# Patient Record
Sex: MALE | Race: WHITE | NOT HISPANIC OR LATINO | Employment: UNEMPLOYED | URBAN - METROPOLITAN AREA
[De-identification: names, ages, dates, MRNs, and addresses within clinical notes are randomized per-mention and may not be internally consistent; named-entity substitution may affect disease eponyms.]

---

## 2017-03-24 ENCOUNTER — ALLSCRIPTS OFFICE VISIT (OUTPATIENT)
Dept: OTHER | Facility: OTHER | Age: 52
End: 2017-03-24

## 2017-03-24 DIAGNOSIS — R10.84 GENERALIZED ABDOMINAL PAIN: ICD-10-CM

## 2017-03-24 DIAGNOSIS — R19.7 DIARRHEA: ICD-10-CM

## 2017-04-01 ENCOUNTER — HOSPITAL ENCOUNTER (OUTPATIENT)
Dept: RADIOLOGY | Facility: HOSPITAL | Age: 52
Discharge: HOME | End: 2017-04-01
Payer: COMMERCIAL

## 2017-04-01 DIAGNOSIS — R10.84 GENERALIZED ABDOMINAL PAIN: ICD-10-CM

## 2017-04-01 PROCEDURE — 76700 US EXAM ABDOM COMPLETE: CPT

## 2017-04-04 ENCOUNTER — GENERIC CONVERSION - ENCOUNTER (OUTPATIENT)
Dept: OTHER | Facility: OTHER | Age: 52
End: 2017-04-04

## 2017-05-10 ENCOUNTER — ALLSCRIPTS OFFICE VISIT (OUTPATIENT)
Dept: OTHER | Facility: OTHER | Age: 52
End: 2017-05-10

## 2017-05-10 DIAGNOSIS — E55.9 VITAMIN D DEFICIENCY: ICD-10-CM

## 2017-05-10 DIAGNOSIS — R73.01 IMPAIRED FASTING GLUCOSE: ICD-10-CM

## 2017-05-10 DIAGNOSIS — E66.01 MORBID (SEVERE) OBESITY DUE TO EXCESS CALORIES (HCC): ICD-10-CM

## 2017-05-10 DIAGNOSIS — I10 ESSENTIAL (PRIMARY) HYPERTENSION: ICD-10-CM

## 2017-05-10 DIAGNOSIS — E53.8 DEFICIENCY OF OTHER SPECIFIED B GROUP VITAMINS: ICD-10-CM

## 2017-05-10 DIAGNOSIS — R53.83 OTHER FATIGUE: ICD-10-CM

## 2017-05-11 ENCOUNTER — GENERIC CONVERSION - ENCOUNTER (OUTPATIENT)
Dept: OTHER | Facility: OTHER | Age: 52
End: 2017-05-11

## 2017-05-11 ENCOUNTER — APPOINTMENT (OUTPATIENT)
Dept: LAB | Facility: HOSPITAL | Age: 52
End: 2017-05-11
Payer: COMMERCIAL

## 2017-05-11 ENCOUNTER — TRANSCRIBE ORDERS (OUTPATIENT)
Dept: LAB | Facility: HOSPITAL | Age: 52
End: 2017-05-11

## 2017-05-11 DIAGNOSIS — R53.83 OTHER FATIGUE: ICD-10-CM

## 2017-05-11 DIAGNOSIS — R73.01 IMPAIRED FASTING GLUCOSE: ICD-10-CM

## 2017-05-11 DIAGNOSIS — E53.8 DEFICIENCY OF OTHER SPECIFIED B GROUP VITAMINS: ICD-10-CM

## 2017-05-11 DIAGNOSIS — E66.01 MORBID (SEVERE) OBESITY DUE TO EXCESS CALORIES (HCC): ICD-10-CM

## 2017-05-11 DIAGNOSIS — E55.9 VITAMIN D DEFICIENCY: ICD-10-CM

## 2017-05-11 DIAGNOSIS — I10 ESSENTIAL (PRIMARY) HYPERTENSION: ICD-10-CM

## 2017-05-11 LAB
25(OH)D3 SERPL-MCNC: 48.1 NG/ML (ref 30–100)
ALBUMIN SERPL BCP-MCNC: 3.4 G/DL (ref 3.5–5)
ALP SERPL-CCNC: 98 U/L (ref 46–116)
ALT SERPL W P-5'-P-CCNC: 32 U/L (ref 12–78)
ANION GAP SERPL CALCULATED.3IONS-SCNC: 9 MMOL/L (ref 4–13)
AST SERPL W P-5'-P-CCNC: 32 U/L (ref 5–45)
BASOPHILS # BLD AUTO: 0.07 THOUSANDS/ΜL (ref 0–0.1)
BASOPHILS NFR BLD AUTO: 1 % (ref 0–1)
BILIRUB SERPL-MCNC: 0.67 MG/DL (ref 0.2–1)
BUN SERPL-MCNC: 8 MG/DL (ref 5–25)
CALCIUM SERPL-MCNC: 8.9 MG/DL (ref 8.3–10.1)
CHLORIDE SERPL-SCNC: 102 MMOL/L (ref 100–108)
CO2 SERPL-SCNC: 27 MMOL/L (ref 21–32)
CREAT SERPL-MCNC: 0.86 MG/DL (ref 0.6–1.3)
EOSINOPHIL # BLD AUTO: 0.44 THOUSAND/ΜL (ref 0–0.61)
EOSINOPHIL NFR BLD AUTO: 5 % (ref 0–6)
ERYTHROCYTE [DISTWIDTH] IN BLOOD BY AUTOMATED COUNT: 12.4 % (ref 11.6–15.1)
EST. AVERAGE GLUCOSE BLD GHB EST-MCNC: 183 MG/DL
GFR SERPL CREATININE-BSD FRML MDRD: >60 ML/MIN/1.73SQ M
GLUCOSE P FAST SERPL-MCNC: 217 MG/DL (ref 65–99)
HBA1C MFR BLD: 8 % (ref 4.2–6.3)
HCT VFR BLD AUTO: 45.6 % (ref 36.5–49.3)
HGB BLD-MCNC: 15.4 G/DL (ref 12–17)
LYMPHOCYTES # BLD AUTO: 1.52 THOUSANDS/ΜL (ref 0.6–4.47)
LYMPHOCYTES NFR BLD AUTO: 18 % (ref 14–44)
MCH RBC QN AUTO: 30.7 PG (ref 26.8–34.3)
MCHC RBC AUTO-ENTMCNC: 33.8 G/DL (ref 31.4–37.4)
MCV RBC AUTO: 91 FL (ref 82–98)
MONOCYTES # BLD AUTO: 0.56 THOUSAND/ΜL (ref 0.17–1.22)
MONOCYTES NFR BLD AUTO: 7 % (ref 4–12)
NEUTROPHILS # BLD AUTO: 5.61 THOUSANDS/ΜL (ref 1.85–7.62)
NEUTS SEG NFR BLD AUTO: 69 % (ref 43–75)
NRBC BLD AUTO-RTO: 0 /100 WBCS
PLATELET # BLD AUTO: 251 THOUSANDS/UL (ref 149–390)
PMV BLD AUTO: 10.5 FL (ref 8.9–12.7)
POTASSIUM SERPL-SCNC: 4.1 MMOL/L (ref 3.5–5.3)
PROT SERPL-MCNC: 7.1 G/DL (ref 6.4–8.2)
RBC # BLD AUTO: 5.01 MILLION/UL (ref 3.88–5.62)
SODIUM SERPL-SCNC: 138 MMOL/L (ref 136–145)
TSH SERPL DL<=0.05 MIU/L-ACNC: 2.68 UIU/ML (ref 0.36–3.74)
VIT B12 SERPL-MCNC: 217 PG/ML (ref 100–900)
WBC # BLD AUTO: 8.29 THOUSAND/UL (ref 4.31–10.16)

## 2017-05-11 PROCEDURE — 83036 HEMOGLOBIN GLYCOSYLATED A1C: CPT

## 2017-05-11 PROCEDURE — 80053 COMPREHEN METABOLIC PANEL: CPT

## 2017-05-11 PROCEDURE — 36415 COLL VENOUS BLD VENIPUNCTURE: CPT

## 2017-05-11 PROCEDURE — 82607 VITAMIN B-12: CPT

## 2017-05-11 PROCEDURE — 85025 COMPLETE CBC W/AUTO DIFF WBC: CPT

## 2017-05-11 PROCEDURE — 84443 ASSAY THYROID STIM HORMONE: CPT

## 2017-05-11 PROCEDURE — 82306 VITAMIN D 25 HYDROXY: CPT

## 2017-05-15 ENCOUNTER — ALLSCRIPTS OFFICE VISIT (OUTPATIENT)
Dept: OTHER | Facility: OTHER | Age: 52
End: 2017-05-15

## 2017-05-26 ENCOUNTER — ALLSCRIPTS OFFICE VISIT (OUTPATIENT)
Dept: OTHER | Facility: OTHER | Age: 52
End: 2017-05-26

## 2017-06-02 ENCOUNTER — ALLSCRIPTS OFFICE VISIT (OUTPATIENT)
Dept: OTHER | Facility: OTHER | Age: 52
End: 2017-06-02

## 2017-08-28 DIAGNOSIS — E11.65 TYPE 2 DIABETES MELLITUS WITH HYPERGLYCEMIA (HCC): ICD-10-CM

## 2017-08-28 DIAGNOSIS — I10 ESSENTIAL (PRIMARY) HYPERTENSION: ICD-10-CM

## 2017-09-11 ENCOUNTER — ALLSCRIPTS OFFICE VISIT (OUTPATIENT)
Dept: OTHER | Facility: OTHER | Age: 52
End: 2017-09-11

## 2017-10-01 ENCOUNTER — APPOINTMENT (EMERGENCY)
Dept: RADIOLOGY | Facility: HOSPITAL | Age: 52
End: 2017-10-01
Payer: COMMERCIAL

## 2017-10-01 ENCOUNTER — HOSPITAL ENCOUNTER (EMERGENCY)
Facility: HOSPITAL | Age: 52
Discharge: HOME | End: 2017-10-01
Attending: EMERGENCY MEDICINE
Payer: COMMERCIAL

## 2017-10-01 VITALS
WEIGHT: 315 LBS | HEART RATE: 92 BPM | TEMPERATURE: 97.3 F | DIASTOLIC BLOOD PRESSURE: 84 MMHG | SYSTOLIC BLOOD PRESSURE: 157 MMHG | RESPIRATION RATE: 18 BRPM | OXYGEN SATURATION: 96 %

## 2017-10-01 DIAGNOSIS — M79.18 BUTTOCK PAIN: Primary | ICD-10-CM

## 2017-10-01 DIAGNOSIS — M25.552 LEFT HIP PAIN: ICD-10-CM
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
## 2017-10-01 PROCEDURE — 73502 X-RAY EXAM HIP UNI 2-3 VIEWS: CPT

## 2017-10-01 PROCEDURE — 99283 EMERGENCY DEPT VISIT LOW MDM: CPT

## 2017-10-01 RX ORDER — ACETAMINOPHEN 325 MG/1
650 TABLET ORAL ONCE
Status: COMPLETED | OUTPATIENT
Start: 2017-10-01 | End: 2017-10-01

## 2017-10-01 RX ORDER — METOPROLOL SUCCINATE 50 MG/1
50 TABLET, EXTENDED RELEASE ORAL DAILY
COMMUNITY
Start: 2017-06-02 | End: 2019-04-19 | Stop reason: ALTCHOICE

## 2017-10-01 RX ORDER — LIDOCAINE 50 MG/G
1 PATCH TOPICAL ONCE
Status: DISCONTINUED | OUTPATIENT
Start: 2017-10-01 | End: 2017-10-02 | Stop reason: HOSPADM

## 2017-10-01 RX ORDER — ATORVASTATIN CALCIUM 10 MG/1
10 TABLET, FILM COATED ORAL DAILY
COMMUNITY
Start: 2016-11-18 | End: 2018-03-12 | Stop reason: SDUPTHER

## 2017-10-01 RX ADMIN — ACETAMINOPHEN 650 MG: 325 TABLET, FILM COATED ORAL at 21:01

## 2017-10-01 RX ADMIN — LIDOCAINE 1 PATCH: 50 PATCH CUTANEOUS at 21:01

## 2017-10-02 NOTE — ED ATTENDING ATTESTATION
Jacob Aceves MD, saw and evaluated the patient  I have discussed the patient with the resident/non-physician practitioner and agree with the resident's/non-physician practitioner's findings, Plan of Care, and MDM as documented in the resident's/non-physician practitioner's note, except where noted  All available labs and Radiology studies were reviewed  At this point I agree with the current assessment done in the Emergency Department  I have conducted an independent evaluation of this patient a history and physical is as follows:  Pt had a fall Slipped and fell on wood floors Pt did split  Pt did not hit hea dno loc Hit legs and buttocks  Pt has pain over L gluteus and l hip  Pain is worse with movement no abd pain   Pt is able to ambulate the event occurred at 5:30 PE: alert nad heart reg lungs clear abd soft nontender no spine tenderness L buttock and l lateral hip tender pain with external rotation of hip good nv normal; sensation MDM: will do xray to eval ? Small avulsion fracture off latera greater trochanter Will place on crutches to follow with ortho     Critical Care Time  CritCare Time

## 2017-10-02 NOTE — DISCHARGE INSTRUCTIONS
Musculoskeletal Pain   WHAT YOU NEED TO KNOW:   Muscle pain can be dull, achy, or sharp  You may have pain and tenderness to the touch as well  It can occur anywhere on your body and is often brought on by exercise  Muscle pain may occur from an injury, such as a sprain, tendonitis, or bone fracture  Muscle pain can also be the result of medical conditions, such as polymyositis, fibromyalgia, and connective tissue disorders  DISCHARGE INSTRUCTIONS:   Self care:   · Rest  as directed and avoid activity that causes pain  You may be able to return to normal activity when you can move without pain  Follow directions for rest and activity  You are at risk for injury for 3 weeks after your symptoms go away  · Ice  your painful muscle area to decrease pain and swelling  Use an ice pack, or put ice in a plastic bag and cover it with a towel  Always  put a cloth between the ice and your skin  Apply the ice as often as directed for the first 24 to 48 hours  · Compression  with a splint, brace, or elastic bandage helps decrease pain and swelling  This may be needed for muscle pain in arms or legs  A splint, brace, or bandage will also help protect the painful area when you move around  · Elevate  a painful arm or leg to reduce swelling and pain  Elevate your limb while you are sitting or lying down  Prop a painful leg on pillows to keep it above the level of your heart  Medicines:   · NSAIDs  help decrease swelling and pain or fever  This medicine is available with or without a doctor's order  NSAIDs can cause stomach bleeding or kidney problems in certain people  If you take blood thinner medicine, always ask your healthcare provider if NSAIDs are safe for you  Always read the medicine label and follow directions  · Acetaminophen  is used to decrease pain  It is available without a doctor's order  Ask your healthcare provider how much to take and when to take it  Follow directions   Acetaminophen can cause liver damage if not taken correctly  Do not take more than one medicine that contains acetaminophen unless directed  · Muscle relaxers  help relax your muscles to decrease pain and muscle spasms  · Steroids  may be given to decrease redness, pain, and swelling  · Take your medicine as directed  Contact your healthcare provider if you think your medicine is not helping or if you have side effects  Tell him if you are allergic to any medicine  Keep a list of the medicines, vitamins, and herbs you take  Include the amounts, and when and why you take them  Bring the list or the pill bottles to follow-up visits  Carry your medicine list with you in case of an emergency  Follow up with your healthcare provider as directed: You may need more tests to help healthcare providers find the cause of your muscle pain  You may need physical therapy to learn muscle strengthening exercises  Write down your questions so you remember to ask them during your visits  Contact your healthcare provider if:   · You have a fever  · You have trouble sleeping because of your pain  · Your painful area becomes more tender, red, and warm to the touch  · You have decreased movement of the painful area  · You have questions or concerns about your condition or care  Return to the emergency department if:   · You have increased severe pain when you move the painful muscle area  · You lose feeling in your painful muscle area  · You have new or worse swelling in the painful area  Your skin may feel tight  · You have increased muscle pain or pain that does not improve with treatment  © 2017 [address] [employer] Information is for End User's use only and may not be sold, redistributed or otherwise used for commercial purposes  All illustrations and images included in CareNotes® are the copyrighted property of [employer]  or [address]  The above information is an  only  It is not intended as medical advice for individual conditions or treatments  Talk to your doctor, nurse or pharmacist before following any medical regimen to see if it is safe and effective for you  Hip Pain   WHAT YOU NEED TO KNOW:   Hip pain can be caused by a number of conditions, such as bursitis, arthritis, or muscle or tendon strain  X-rays do not show broken bones  You may have swelling in the fluid-filled sacs that protect your muscles and tendons  Hip pain can also be caused by a lower back problem  Hip pain may be caused by trauma, playing sports, or running  Your pain may start in your hip and go to your thigh, buttock, or groin  DISCHARGE INSTRUCTIONS:   Medicines:   · NSAIDs , such as ibuprofen, help decrease swelling, pain, and fever  This medicine is available with or without a doctor's order  NSAIDs can cause stomach bleeding or kidney problems in certain people  If you take blood thinner medicine, always ask your healthcare provider if NSAIDs are safe for you  Always read the medicine label and follow directions  · Take your medicine as directed  Contact your healthcare provider if you think your medicine is not helping or if you have side effects  Tell him of her if you are allergic to any medicine  Keep a list of the medicines, vitamins, and herbs you take  Include the amounts, and when and why you take them  Bring the list or the pill bottles to follow-up visits  Carry your medicine list with you in case of an emergency  Return to the emergency department if:   · Your pain gets worse  · You have numbness in your leg or toes  · You cannot put any weight on or move your hip  Contact your healthcare provider if:   · You have a fever  · Your pain does not decrease, even after treatment  · You have questions or concerns about your condition or care  Follow up with your healthcare provider as directed: You may need physical therapy, an injection, or more testing   You may need to see an orthopedic specialist  Write down your questions so you remember to ask them during your visits  Manage your hip pain:   · Rest  your injured hip so that it can heal  You may need to avoid putting any weight on your hip for at least 48 hours  Return to normal activities as directed  · Ice  the injury for 20 minutes every 4 hours, or as directed  Use an ice pack, or put crushed ice in a plastic bag  Cover it with a towel to protect your skin  Ice helps prevent tissue damage and decreases swelling and pain  · Elevate  your injured hip above the level of your heart as often as you can  This will help decrease swelling and pain  If possible, prop your hip and leg on pillows or blankets to keep the area elevated comfortably  · Maintain a healthy weight  Extra body weight can cause pressure and pain in your hip, knee, and ankle joints  Ask your healthcare provider how much you should weigh  Ask him to help you create a weight loss plan if you are overweight  · Use assistive devices as directed  You may need to use a cane or crutches  Assistive devices help decrease pain and pressure on your hip when you walk  Ask your healthcare provider for more information about assistive devices and how to use them correctly  © 2017 [facility] [facility] Information is for End User's use only and may not be sold, redistributed or otherwise used for commercial purposes  All illustrations and images included in CareNotes® are the copyrighted property of [employer]  or [employer]  The above information is an  only  It is not intended as medical advice for individual conditions or treatments  Talk to your doctor, nurse or pharmacist before following any medical regimen to see if it is safe and effective for you

## 2017-10-02 NOTE — ED PROVIDER NOTES
History  Chief Complaint   Patient presents with   Sultana Grover Fall     pt walking at his friends house on [address] with socks on and slipped and fell, co left hip pain, pt did not strike his head, no LOC, pt ambulated into ER     Patient is a 50year old M with pmhx significant for DM, s/p gastric bypass surgery, HTN who presents with left buttock pain and left hip pain s/p fall from standing  Reports he was walking on wooden floors in only socks and slipped falling in a split position, striking his left buttocks  Denies hitting head/ LOC  Complains of throbbing pain in the left buttocks and left hip, non-radiating, worse with movement though continues to be able to ambulate  Denies back pain, neck pain, numbness, tingling, loss of bowel or bladder control  Assessment and Plan: Left buttocks pain- MSK pain 2/2 mechanical fall  Will XR left hip/pelvis to r/o acute fracture  Lidoderm patch and tylenol as patient cannot get NSAIDs 2/2 gastric bypass surgery  Prior to Admission Medications   Prescriptions Last Dose Informant Patient Reported? Taking?   atorvastatin (LIPITOR) 10 mg tablet 10/1/2017 at Unknown time  Yes Yes   Sig: Take 10 mg by mouth daily   metoprolol succinate (TOPROL-XL) 50 mg 24 hr tablet 10/1/2017 at Unknown time  Yes Yes   Sig: Take 50 mg by mouth daily      Facility-Administered Medications: None       Past Medical History:   Diagnosis Date    Asthma     Diabetes mellitus     Gastric bypass status for obesity     Hyperlipidemia     Hypertension     Obesity        History reviewed  No pertinent surgical history  History reviewed  No pertinent family history  I have reviewed and agree with the history as documented  Social History   Substance Use Topics    Smoking status: Never Smoker    Smokeless tobacco: Never Used    Alcohol use 0 6 oz/week     1 Shots of liquor per week        Review of Systems   Constitutional: Negative for chills and fever     HENT: Negative for congestion  Eyes: Negative for visual disturbance  Respiratory: Negative for shortness of breath  Cardiovascular: Negative for chest pain and palpitations  Gastrointestinal: Negative for diarrhea, nausea and vomiting  Genitourinary: Negative for dysuria and hematuria  Musculoskeletal: Negative for back pain, neck pain and neck stiffness  Skin: Negative for wound  Neurological: Negative for dizziness, weakness, light-headedness, numbness and headaches  All other systems reviewed and are negative  Physical Exam  ED Triage Vitals [10/01/17 2018]   Temperature Pulse Respirations Blood Pressure SpO2   (!) 97 3 °F (36 3 °C) 92 22 155/80 97 %      Temp Source Heart Rate Source Patient Position - Orthostatic VS BP Location FiO2 (%)   Tympanic Monitor Lying Left arm --      Pain Score       Worst Possible Pain           Physical Exam   Constitutional: He is oriented to person, place, and time  He appears well-developed and well-nourished  No distress  HENT:   Head: Normocephalic and atraumatic  Right Ear: External ear normal    Left Ear: External ear normal    Nose: Nose normal    Mouth/Throat: Oropharynx is clear and moist  No oropharyngeal exudate  Eyes: Conjunctivae and EOM are normal  Pupils are equal, round, and reactive to light  Neck: Normal range of motion  Neck supple  Cardiovascular: Normal rate, regular rhythm, normal heart sounds and intact distal pulses  Exam reveals no gallop and no friction rub  No murmur heard  Pulmonary/Chest: Effort normal and breath sounds normal  No respiratory distress  He has no wheezes  He has no rales  He exhibits no tenderness  Abdominal: Soft  Bowel sounds are normal  He exhibits no distension  There is no tenderness  Musculoskeletal:        Right hip: Normal  He exhibits normal range of motion, normal strength, no tenderness, no bony tenderness, no swelling and no crepitus          Left hip: Normal  He exhibits normal range of motion, normal strength, no tenderness, no bony tenderness, no swelling and no crepitus  Left knee: Normal  He exhibits normal range of motion, no swelling, no effusion, no ecchymosis, no deformity and no laceration  Left ankle: Normal  He exhibits normal range of motion, no swelling, no ecchymosis, no deformity, no laceration and normal pulse  Cervical back: Normal  He exhibits normal range of motion, no tenderness, no bony tenderness, no swelling, no edema, no deformity and no laceration  Thoracic back: Normal  He exhibits normal range of motion, no tenderness, no bony tenderness, no swelling, no edema, no deformity and no laceration  Lumbar back: Normal  He exhibits normal range of motion, no tenderness, no bony tenderness, no swelling, no edema, no deformity and no laceration  Legs:       Left foot: Normal  There is normal range of motion, no tenderness, no bony tenderness, no swelling, normal capillary refill and no crepitus  Neurological: He is alert and oriented to person, place, and time  Moves all 4 extremities  Able to ambulate  Skin: Skin is warm and dry  Capillary refill takes less than 2 seconds  Rash (psoriasis patches on various locations over the body) noted  He is not diaphoretic  No erythema  Psychiatric: He has a normal mood and affect  His behavior is normal    Nursing note and vitals reviewed        ED Medications  Medications   lidocaine (LIDODERM) 5 % patch 1 patch (1 patch Transdermal Medication Applied 10/1/17 2101)   acetaminophen (TYLENOL) tablet 650 mg (650 mg Oral Given 10/1/17 2101)       Diagnostic Studies  Labs Reviewed - No data to display    XR hip/pelv 2-3 vws left   ED Interpretation   Abnormal   Possible chip off lateral aspect of greater trochanter unclear if   new          Procedures  Procedures      Phone Consults  ED Phone Contact    ED Course  ED Course as of Oct 01 2153   Beverlie Olszewski Oct 01, 2017   2137 Discussed RICE therapy with patient and instructed to take tylenol for pain- no NSAIDs  Patient verbalized understanding                                 MDM  CritCare Time    Disposition  Final diagnoses:   Buttock pain   Left hip pain   Fall from standing, initial encounter     ED Disposition     ED Disposition Condition Comment    Discharge  Jensen Garcia discharge to home/self care  Condition at discharge: Good        Follow-up Information     Follow up With Specialties Details Why Contact Info Additional Information    Hunter Bermudez MD Family Medicine Schedule an appointment as soon as possible for a visit in 1 week for re-evaluation [facility] [address] [facility]  [phone]       [address] [place] [address] Emergency Department Emergency Medicine Go to for re-evaluation, As needed, If symptoms worsen [address] [facility] [address] [facility] ED, [address] [address]  Schedule an appointment as soon as possible for a visit in 5 days for [facility]uation [facility]  [phone]         Patient's Medications   Discharge Prescriptions    No medications on file     No discharge procedures on file  ED Provider  Attending physically available and evaluated Jensen Garcia I managed the patient along with the ED Attending      Electronically Signed by       Kandace Dumont DO  Resident  10/01/17 3029       Kandace Dumont DO  Resident  10/01/17 7850

## 2017-10-04 ENCOUNTER — ALLSCRIPTS OFFICE VISIT (OUTPATIENT)
Dept: OTHER | Facility: OTHER | Age: 52
End: 2017-10-04

## 2017-10-05 NOTE — PROGRESS NOTES
Assessment  1  Contusion of left hip, initial encounter (924 01) (S70 [id number])    Plan  Contusion of left hip, initial encounter    · Follow-up visit in 1 month Evaluation and Treatment  Follow-up  Status: Hold For -  Scheduling  Requested for: [id number]   · Apply an ice pack 4 times a day for 20 minutes the first 2 days ; Status:Complete;   Done:  50HLZ6805 09:22AM   · Apply ice to your knee for 30 minutes after exercise to help reduce swelling and pain ;  Status:Complete;   Done: 37FJK2067 09:22AM    Discussion/Summary    29-year-old male with a contusion to his left hip  Reviewed the radiographs  At this point I have recommended regular stretching, range of motion, and icing  He may progress activities as tolerated  I will plan on seeing him back in a month for repeat evaluation  No x-rays should be needed at that time  documentation has been constructed using a voice recognition system  Chief Complaint  Left hip pain      History of Present Illness  HPI: 29-year-old male here today for evaluation of his left hip  On October 1, 2017 he slipped at a friend's house where he had hardwood floors  His legs without different directions and he came down on his buttock  Following day after the fall he had fairly severe pain  He was seen in the ER and is here today for his initial follow-up  He's had trouble weightbearing and now is using a cane because it hurts  He points to the posterior lateral aspect of his hip and buttock is to where it hurts him the most  He's unable to take anti-inflammatories due to a history of a gastric bypass surgery  He's not been icing it  He notes stiffness and soreness  It gets worse if he's been sitting for any length of time  medical intake form was reviewed      Review of Systems    Constitutional: No fever or chills, feels well, no tiredness, no recent weight loss or weight gain  Eyes: No complaints of red eyes, no eyesight problems     ENT: no complaints of loss of hearing, no nosebleeds, no sore throat  Cardiovascular: No complaints of chest pain, no palpitations, no leg claudication or lower extremity edema  Respiratory: No complaints of shortness of breath, no wheezing, no cough  Gastrointestinal: No complaints of abdominal pain, no constipation, no nausea or vomiting, no diarrhea or bloody stools  Genitourinary: No complaints of dysuria or incontinence, no hesitancy, no nocturia  Musculoskeletal: limb pain, but-as noted in HPI  Integumentary: No complaints of skin rash or lesion, no itching or dry skin, no skin wounds  Neurological: No complaints of headache, no confusion, no numbness or tingling, no dizziness  Psychiatric: No suicidal thoughts, no anxiety, no depression  Endocrine: No muscle weakness, no frequent urination, no excessive thirst, no feelings of weakness  ROS reviewed  Active Problems  1  Acute bronchitis with obstruction (466 0) (J20 9)   2  Acute gastroenteritis (558 9) (K52 9)   3  Essential hypertension (401 9) (I10)   4  Fatigue (780 79) (R53 83)   5  Hyperlipidemia (272 4) (E78 5)   6  Mild persistent asthma without complication (768 75) (P62 92)   7  Morbid obesity due to excess calories (278 01) (E66 01)   8  Primary osteoarthritis of right knee (715 16) (M17 11)   9  Psoriasis (696 1) (L40 9)   10  Type 2 diabetes mellitus with hyperglycemia (250 00) (E11 65)   11  Vitamin B12 deficiency (266 2) (E53 8)   12   Vitamin D deficiency (268 9) (E55 9)    Past Medical History   · History of Abdominal pain, generalized (789 07) (R10 84)   · History of Acute gastritis without hemorrhage (535 00) (K29 00)   · History of asthma (V12 69) (Z87 09)   · History of diarrhea (V12 79) (R25 083)   · History of fatigue (V13 89) (S17 917)   · History of viral gastroenteritis (V12 09) (Z86 19)   · History of Impaired fasting glucose (790 21) (R73 01)   · History of Right knee pain (719 46) (M25 561)   · History of Weight gain (783 1) (R63 5)    The active problems and past medical history were reviewed and updated today  Surgical History   · History of Gastric Surgery For Morbid Obesity Bypass With Samuel-en-Y   · History of Hernia Repair    The surgical history was reviewed and updated today  Family History  Mother    · Family history of cerebrovascular accident (CVA) (V17 1) (Z82 3)  Father    · Family history of hypertension (V17 49) (Z82 49)    The family history was reviewed and updated today  Social History   · Never A Smoker   · Social drinker  The social history was reviewed and updated today  Current Meds   1  Atorvastatin Calcium 10 MG Oral Tablet; take 1 tablet by mouth every day; Therapy: 31PTO8703 to (Evaluate:69Tsf6025)  Requested for: 78Sgu5236; Last   Rx:79Yhk5268 Ordered   2  Cyanocobalamin 1000 MCG/ML Injection Solution; INJECT 1 ML INTRAMUSCULARLY   ONCE A MONTH; Recurring Schedule:02Jun2017 to (Exp:02Jun2018); Status: IN   PROGRESS - Order Generated Ordered   3  Cyanocobalamin 1000 MCG/ML Injection Solution; INJECT 1 ML INTRAMUSCULARLY   ONCE A MONTH; To Be Done: 08Cxk4102; Status: HOLD FOR   - Administration Ordered   4  Cyanocobalamin 1000 MCG/ML Injection Solution; INJECT 1 ML INTRAMUSCULARLY   ONCE A MONTH; To Be Done: 17QNW4035; Status: HOLD FOR   - Administration Ordered   5  Cyanocobalamin 1000 MCG/ML Injection Solution; INJECT 1 ML INTRAMUSCULARLY   ONCE A MONTH; To Be Done: 34BNV2127; Status: HOLD FOR   - Administration Ordered   6  Cyanocobalamin 1000 MCG/ML Injection Solution; INJECT 1 ML INTRAMUSCULARLY   ONCE A MONTH; To Be Done: 91Opm3178; Status: HOLD FOR   - Administration Ordered   7  Levalbuterol HCl - 1 25 MG/3ML Inhalation Nebulization Solution; USE 1 VIAL IN   NEBULIZER 3 TIMES A DAY PRN; Therapy: 23RHB8712 to (Last Rx:95Uqf6457)  Requested for: 58YJM5518 Ordered   8  Lisinopril 10 MG Oral Tablet; Take 1 tablet daily;    Therapy: 99LLY4750 to (Evaluate:78Yef2007)  Requested for: 99Yvg7191; Last Rx: 52UCX1720 Ordered   9  MetFORMIN HCl - 500 MG Oral Tablet; take 1 tablet bid after meals; Therapy: 54LCX3885 to (Last Rx:11May2017)  Requested for: 88Xvh6803 Ordered   10  Metoprolol Succinate ER 50 MG Oral Tablet Extended Release 24 Hour; take 1 tablet by    mouth daily; Therapy: 03DBI3432 to (Evaluate:84Bye3556)  Requested for: 80Aqd6235; Last    Rx:02Jun2017 Ordered   11  Multiple Vitamins Oral Tablet; take 1 tab daily; Therapy: 91Ydh6161 to Recorded   12  Proventil  (90 Base) MCG/ACT Inhalation Aerosol Solution; TAKE 2 PUFFS BY    MOUTH EVERY 6 HOURS AS NEEDED FOR SHORTNESS OF BREATH; Therapy: 56XET9432 to (Evaluate:21Zsm9673)  Requested for: 27XZG3327; Last    Rx:19Yxf6909 Ordered   13  Symbicort 160-4 5 MCG/ACT Inhalation Aerosol; INHALE 2 PUFFS TWICE DAILY  RINSE MOUTH AFTER USE; Therapy: 32WJC2568 to (Last Rx:02Jun2017)  Requested for: 09Fbz0817 Ordered   14  Vitamin D (Ergocalciferol) 48671 UNIT Oral Capsule; TAKE 1 CAPSULE BY MOUTH    EVERY WEEK; Therapy: 71RSG1015 to (Evaluate:11Gph5642)  Requested for: 01Rlw7338; Last    Rx:44Ewe8090 Ordered    The medication list was reviewed and updated today  Allergies  1  Bactrim TABS    Vitals  Signs   Heart Rate: 91  Systolic: 814  Diastolic: 80    Physical Exam      Right Hip:   Inspection and Palpation: Normal    ROM: Normal    Strength: Normal    Stability: Normal    Left Hip: Appearance: Normal  Tenderness: gluteus medius-and-greater trochanter and bursa  Palpatory findings include no crepitus  ROM: limited ROM in all planes Flexion: which was painful  Extension: which was painful  Internal rotation: which was painful  External rotation: which was painful  Motor: Normal  Flexion was painful  Extension was painful  Abduction was painful  Special Tests: no joint laxity-and-negative Straight Leg Raise  Constitutional - General appearance: Abnormal  morbidly obese     Musculoskeletal - Gait and station: Abnormal  Gait evaluation demonstrated limping on the left  Neurologic - Lower extremity peripheral neuro exam: Normal    Psychiatric - Orientation to person, place, and time: Normal -Mood and affect: Normal       Results/Data  I personally reviewed the films/images/results in the office today  My interpretation follows  X-ray Review X is the left hip show no significant osteoarthritis  There appears to be a small avulsion fracture posteriorly off the greater trochanter, unclear as to whether or not an acute or chronic  Future Appointments    Date/Time Provider Specialty Site   11/07/2017 09:15 AM Rayray Glover MD Orthopedic Surgery [facility]     Message  Return to work or school:   Wing Márquez is under my professional care   He was seen in my office on 10/4/17   He is able to return to work on  10/9/17            Signatures   Electronically signed by : Sean Roberts MD; Oct  4 2017  9:22AM EST                       (Author)

## 2017-11-04 ENCOUNTER — HOSPITAL ENCOUNTER (EMERGENCY)
Facility: HOSPITAL | Age: 52
Discharge: HOME | End: 2017-11-04
Attending: EMERGENCY MEDICINE | Admitting: EMERGENCY MEDICINE
Payer: COMMERCIAL

## 2017-11-04 ENCOUNTER — APPOINTMENT (EMERGENCY)
Dept: RADIOLOGY | Facility: HOSPITAL | Age: 52
End: 2017-11-04
Payer: COMMERCIAL

## 2017-11-04 VITALS
WEIGHT: 315 LBS | SYSTOLIC BLOOD PRESSURE: 184 MMHG | TEMPERATURE: 97.4 F | DIASTOLIC BLOOD PRESSURE: 87 MMHG | HEART RATE: 83 BPM | BODY MASS INDEX: 41.75 KG/M2 | HEIGHT: 73 IN | RESPIRATION RATE: 20 BRPM | OXYGEN SATURATION: 97 %

## 2017-11-04 DIAGNOSIS — M79.642 LEFT HAND PAIN: ICD-10-CM

## 2017-11-04 DIAGNOSIS — M25.532 LEFT WRIST PAIN: Primary | ICD-10-CM

## 2017-11-04 PROCEDURE — 73110 X-RAY EXAM OF WRIST: CPT

## 2017-11-04 PROCEDURE — 99283 EMERGENCY DEPT VISIT LOW MDM: CPT

## 2017-11-04 PROCEDURE — 73130 X-RAY EXAM OF HAND: CPT

## 2017-11-04 RX ORDER — ACETAMINOPHEN 325 MG/1
650 TABLET ORAL ONCE
Status: COMPLETED | OUTPATIENT
Start: 2017-11-04 | End: 2017-11-04

## 2017-11-04 RX ADMIN — ACETAMINOPHEN 650 MG: 325 TABLET, FILM COATED ORAL at 14:17

## 2017-11-04 NOTE — DISCHARGE INSTRUCTIONS
Wrist Injury   WHAT YOU NEED TO KNOW:   A wrist injury happens when the tissues of your wrist joint are damaged  Your wrist joint is made up of tendons, ligaments, nerves, and bones  Two common types of injuries that can happen to your wrist are sprains and strains  A sprain can happen when the ligaments are stretched or torn  Ligaments are bands of elastic tissue that connect and hold the bones together  A strain happens when a tendon or muscle is overused, stretched, or torn  Tendons attach your hand and arm muscles to the bones of the wrist    DISCHARGE INSTRUCTIONS:   Medicines:   · NSAIDs:  These medicines decrease swelling, pain, and fever  NSAIDs are available without a doctor's order  Ask which medicine is right for you  Ask how much to take and when to take it  Take as directed  NSAIDs can cause stomach bleeding and kidney problems if not taken correctly  · Pain medicine: You may be given a prescription medicine to decrease pain  Do not wait until the pain is severe before you take this medicine  · Take your medicine as directed  Contact your healthcare provider if you think your medicine is not helping or if you have side effects  Tell him of her if you are allergic to any medicine  Keep a list of the medicines, vitamins, and herbs you take  Include the amounts, and when and why you take them  Bring the list or the pill bottles to follow-up visits  Carry your medicine list with you in case of an emergency  Follow up with your healthcare provider as directed:  Write down your questions so you remember to ask them during your visits  Manage your symptoms:   · Wrist supports:  A cast or splint may be put on your fingers, hand, and wrist to support your wrist and prevent further damage  Wear these as directed  Ask for instructions on how to bathe while you are wearing a splint or case  · Rest:  You may need to rest your wrist for at least 48 hours and avoid activities that cause pain   Ask what swollen  · You have new trouble moving and using your hands, fingers, or wrist     · Your wrist, hands, or fingers become swollen, red, numb, or they tingle  · Your wrist has any open wounds, including from surgery, that are red, swollen, warm, or have pus coming from them  © 2017 [employer] Information is for End User's use only and may not be sold, redistributed or otherwise used for commercial purposes  All illustrations and images included in CareNotes® are the copyrighted property of A D [employer]  or [employer]  The above information is an  only  It is not intended as medical advice for individual conditions or treatments  Talk to your doctor, nurse or pharmacist before following any medical regimen to see if it is safe and effective for you  Hand Sprain   WHAT YOU NEED TO KNOW:   A hand sprain is when a ligament in your hand is stretched or torn  Ligaments are the strong tissues that connect bones  A hand sprain is usually caused by a fall onto your outstretched arm  You may have bruising, pain, and swelling of your injured hand  DISCHARGE INSTRUCTIONS:   Rest your hand: You will need to rest your hand for 1 to 2 days after your injury  This will help decrease the risk of more damage to your hand  Do not lift anything with your injured hand  Ask your healthcare provider when you can return to your normal activities  Ice your hand:  Ice your hand to help decrease swelling and pain  Put crushed ice in a plastic bag and cover it with a towel  Put the ice on your hand for 15 to 20 minutes every hour  Use ice as directed  Use compression:  Compression (tight hold) provides support and helps decrease swelling and movement so your hand can heal  You may need to keep your hand wrapped with an elastic bandage  Elevate your hand:  Keep your injured hand raised above the level of your heart as often as you can  This will help decrease or limit swelling  You can elevate your hand by resting your arm up on a pillow  Use a splint:  You may need to use a splint on your hand and wrist  A splint is a special device that keeps your hand and wrist from moving  Use the splint as directed  Medicines:   · NSAIDs  help decrease swelling and pain or fever  This medicine is available with or without a doctor's order  NSAIDs can cause stomach bleeding or kidney problems in certain people  If you take blood thinner medicine, always ask your healthcare provider if NSAIDs are safe for you  Always read the medicine label and follow directions  · Take your medicine as directed:  Call your healthcare provider if you think your medicine is not helping or if you have side effects  Tell him if you are taking any vitamins, herbs, or other medicines  Keep a list of the medicines you take  Include the amounts, and when and why you take them  Bring the list or the pill bottles to follow up visits  Exercise your hand: You may be given exercises to improve your strength once you are able to move your hand without pain  Exercises will also help decrease stiffness  Start your exercises and normal activities slowly  Exercise your hand as directed  Follow up with your healthcare provider as directed:  Write down your questions you so you remember to ask them during your visits  Call your healthcare provider if:   · The skin of your injured hand looks bluish or pale (less color than normal)  · You have increased swelling and pain in your hand  · You have new or increased numbness in your injured hand  · You have new or increased stiffness or trouble moving your injured hand  · You have questions or concerns about your injury or treatment  © 2017 [facility] [facility] Information is for End User's use only and may not be sold, redistributed or otherwise used for commercial purposes   All illustrations and images included in CareNotes® are the copyrighted property of A  [employer] M [employer]  or [employer]  The above information is an  only  It is not intended as medical advice for individual conditions or treatments  Talk to your doctor, nurse or pharmacist before following any medical regimen to see if it is safe and effective for you  RICE Therapy   WHAT YOU NEED TO KNOW:   RICE therapy is a 4-step process used to reduce swelling and pain from an injury  RICE stands for rest, ice, compression, and elevation  RICE should be done within the first 24 to 48 hours after an injury  DISCHARGE INSTRUCTIONS:   Follow up with your healthcare provider as directed:  Write down your questions so you remember to ask them during your visits  How to use RICE therapy:   · Rest  the injured area so that it can heal  You may need to avoid putting any weight on your injury for at least 48 hours  Return to normal activities as directed  · Ice  the injury for 20 minutes every 4 hours, or as directed  Use an ice pack, or put crushed ice in a plastic bag  Cover it with a towel to protect your skin  Ice helps prevent tissue damage and decreases swelling and pain  · Compress  the injury with an elastic bandage, air cast, special boot, or splint to reduce swelling  Ask your healthcare provider which compression device to use, and how tight it should be  · Elevate  the injured area above the level of your heart as often as you can  This will help decrease swelling and pain  If possible, prop the injured area on pillows or blankets to keep it elevated comfortably  Contact your healthcare provider if:   · Your pain does not decrease, even after treatment  · You have questions or concerns about your condition or care  Return to the emergency department if:   · You have severe pain in the injured area  · You have numbness in the injured area  · You cannot put any weight on or move the injured area    © 2017 [employer]  Information is for End User's use only and may not be sold, redistributed or otherwise used for commercial purposes  All illustrations and images included in CareNotes® are the copyrighted property of A [employer] M [employer]  or [employer]  The above information is an  only  It is not intended as medical advice for individual conditions or treatments  Talk to your doctor, nurse or pharmacist before following any medical regimen to see if it is safe and effective for you

## 2017-11-04 NOTE — ED ATTENDING ATTESTATION
Amber Pete MD, saw and evaluated the patient  I have discussed the patient with the resident/non-physician practitioner and agree with the resident's/non-physician practitioner's findings, Plan of Care, and MDM as documented in the resident's/non-physician practitioner's note, except where noted  All available labs and Radiology studies were reviewed  At this point I agree with the current assessment done in the Emergency Department  I have conducted an independent evaluation of this patient a history and physical is as follows:  Right-hand dominant male with left hand pain after having his hand squeeze between in air conditioning unit and the sill of his window  Patient denied numbness, tingling, weakness  Complains of pain at the radial aspect and involving all of his fingers  No prior injury or surgeries to the area  On exam, the patient does not have significant soft tissue swelling, but has distal radius tenderness, scaphoid tenderness, and tenderness over all of his metacarpals  Impression:  Right hand contusion  X-ray reviewed by me, no evidence of fracture    We will splint      Critical Care Time  CritCare Time

## 2017-11-07 ENCOUNTER — ALLSCRIPTS OFFICE VISIT (OUTPATIENT)
Dept: OTHER | Facility: OTHER | Age: 52
End: 2017-11-07

## 2017-11-07 NOTE — ED PROVIDER NOTES
History  Chief Complaint   Patient presents with    Wrist Pain     Patient states that he was removing his window air conditioner and it landed on his left wrist  Complaining of left wrist and hand pain  49 y/o male presents to the ED for L hand pain x 2 hours  Patient states that he was moving an [facility] out of his window, when it fell, causing his hand to get stuck between the [facility] and the window sill  He states that he has had pain in his wrist and hand since  He is right hand dominant  Denies any N/T/W of his hand  No other injuries  No previous injury or surgery to his hand  History provided by:  Patient  Wrist Pain   Location:  L  Quality:  Pain  Severity:  Moderate  Onset quality:  Gradual  Duration:  2 hours  Timing:  Constant  Progression:  Worsening  Chronicity:  New  Context:  Got it stuck between [facility] and window sill  Relieved by:  None tried   Worsened by: Movement   Ineffective treatments:  None tried  Associated symptoms: no abdominal pain, no chest pain, no congestion, no cough, no diarrhea, no ear pain, no fever, no headaches, no loss of consciousness, no nausea, no rash, no shortness of breath, no sore throat, no vomiting and no wheezing    Risk factors:  None      Prior to Admission Medications   Prescriptions Last Dose Informant Patient Reported? Taking?   atorvastatin (LIPITOR) 10 mg tablet   Yes No   Sig: Take 10 mg by mouth daily   metoprolol succinate (TOPROL-XL) 50 mg 24 hr tablet   Yes No   Sig: Take 50 mg by mouth daily      Facility-Administered Medications: None       Past Medical History:   Diagnosis Date    Asthma     Diabetes mellitus ([facility] [address] )     Gastric bypass status for obesity     Hyperlipidemia     Hypertension     Obesity        History reviewed  No pertinent surgical history  History reviewed  No pertinent family history  I have reviewed and agree with the history as documented      Social History   Substance Use Topics    Smoking status: Never Smoker    Smokeless tobacco: Never Used    Alcohol use 0 6 oz/week     1 Shots of liquor per week      Comment: social        Review of Systems   Constitutional: Negative for chills and fever  HENT: Negative for congestion, ear pain and sore throat  Eyes: Negative for pain and visual disturbance  Respiratory: Negative for cough, shortness of breath and wheezing  Cardiovascular: Negative for chest pain and leg swelling  Gastrointestinal: Negative for abdominal pain, diarrhea, nausea and vomiting  Genitourinary: Negative for dysuria, frequency, hematuria and urgency  Musculoskeletal: Negative for neck pain and neck stiffness  Skin: Negative for rash and wound  Neurological: Negative for loss of consciousness, weakness, numbness and headaches  Psychiatric/Behavioral: Negative for agitation and confusion  All other systems reviewed and are negative  Physical Exam  ED Triage Vitals [11/04/17 1321]   Temperature Pulse Respirations Blood Pressure SpO2   (!) 97 4 °F (36 3 °C) 83 20 (!) 184/87 97 %      Temp Source Heart Rate Source Patient Position - Orthostatic VS BP Location FiO2 (%)   Oral Monitor Sitting Right arm --      Pain Score       8           Orthostatic Vital Signs  Vitals:    11/04/17 1321   BP: (!) 184/87   Pulse: 83   Patient Position - Orthostatic VS: Sitting       Physical Exam   Constitutional: He is oriented to person, place, and time  He appears well-developed and well-nourished  HENT:   Head: Normocephalic and atraumatic  Eyes: EOM are normal  Pupils are equal, round, and reactive to light  Neck: Normal range of motion  Neck supple  Cardiovascular: Normal rate and regular rhythm  Pulmonary/Chest: Effort normal and breath sounds normal    Abdominal: Soft  Bowel sounds are normal  He exhibits no distension  There is no tenderness  Musculoskeletal: Normal range of motion  L upper extremity: Tenderness over distal radius, scaphoid, and all metacarpals  No soft tissue swelling  Neurological: He is alert and oriented to person, place, and time  No focal deficits   Skin: Skin is warm and dry  Nursing note and vitals reviewed  ED Medications  Medications   acetaminophen (TYLENOL) tablet 650 mg (650 mg Oral Given 11/4/17 1417)       Diagnostic Studies  Results Reviewed     None                 XR hand 3+ views LEFT   Final Result by Dieter Coles MD (11/04 1527)      No acute osseous abnormality  Workstation performed: [id number]         XR wrist 3+ views LEFT   Final Result by Dieter Coles MD (11/04 1524)      No acute osseous abnormality  Workstation performed: [id number]               Procedures  Static Splint Application  Date/Time: 11/4/2017 3:20 PM  Performed by: Christine Aguiar  Authorized by: Elizabeth Bates     Patient location:  Bedside  Other Assisting Provider: No    Consent:     Consent obtained:  Verbal    Consent given by:  Patient    Risks discussed:  Discoloration, numbness, pain and swelling    Alternatives discussed:  No treatment  Universal protocol:     Procedure explained and questions answered to patient or proxy's satisfaction: yes      Patient identity confirmed:  Verbally with patient  Indication:     Indications: sprain/strain    Pre-procedure details:     Sensation:  Normal  Procedure details:     Laterality:  Left    Location:  Wrist    Wrist:  L wrist    Splint type:  Thumb spica    Supplies:  Ortho-Glass  Post-procedure details:     Pain:  Improved    Sensation:  Normal    Neurovascular Exam: skin pink, capillary refill <2 sec, normal pulses and skin intact, warm, and dry      Patient tolerance of procedure:   Tolerated well, no immediate complications          Phone Consults  ED Phone Contact    ED Course  ED Course                                MDM  Number of Diagnoses or Management Options  Left hand pain: new and requires workup  Left wrist pain: new and requires workup  Diagnosis management comments: Patient with left wrist and hand pain  Will get xray to r/o fracture and splint  Will give info for hand f/u within next 1 week  Patient reevaluated and feels improved  Patient updated on results of tests  Discharge instructions given including  follow-up, and return precautions  Patient demonstrates verbal understanding and agrees with plan  Amount and/or Complexity of Data Reviewed  Clinical lab tests: ordered and reviewed  Tests in the radiology section of CPT®: ordered and reviewed  Tests in the medicine section of CPT®: ordered and reviewed  Discussion of test results with the performing providers: yes  Decide to obtain previous medical records or to obtain history from someone other than the patient: yes  Obtain history from someone other than the patient: yes  Review and summarize past medical records: yes  Discuss the patient with other providers: yes  Independent visualization of images, tracings, or specimens: yes    Patient Progress  Patient progress: improved    CritCare Time    Disposition  Final diagnoses:   Left wrist pain   Left hand pain     Time reflects when diagnosis was documented in both MDM as applicable and the Disposition within this note     Time User Action Codes Description Comment    11/4/2017  2:41 PM Mars Medrano Add [M25 532] Left wrist pain     11/4/2017  2:41 PM Rocio Ospina Add [K05 549] Left hand pain       ED Disposition     ED Disposition Condition Comment    Discharge  Delorse Flavors discharge to home/self care  Condition at discharge: Good        Follow-up Information     Follow up With Specialties Details Why Contact Info Additional Όθωνος MD [facility] Call in 1 day for follow up within 1 week [address] Emergency Department Emergency Medicine Go to immediately for any new or worsening symptoms    [address] [facility] [address]  [phone]  ED, [address], [address]        Discharge Medication List as of 11/4/2017  3:24 PM      CONTINUE these medications which have NOT CHANGED    Details   atorvastatin (LIPITOR) 10 mg tablet Take 10 mg by mouth daily, Starting Fri 11/18/2016, Historical Med      metoprolol succinate (TOPROL-XL) 50 mg 24 hr tablet Take 50 mg by mouth daily, Starting Fri 6/2/2017, Historical Med           No discharge procedures on file  ED Provider  Attending physically available and evaluated Nina Perdomo I managed the patient along with the ED Attending      Electronically Signed by         Catie Medel DO  Resident  11/07/17 0526

## 2017-11-08 NOTE — PROGRESS NOTES
Assessment  1  Sprain of left wrist, initial encounter (842 00) (C54 054A)    Plan  Sprain of left wrist, initial encounter    · Follow-up PRN Evaluation and Treatment  Follow-up  Status: Complete  Done:  41BIC6482   · We recommend that you stretch your hand muscles  Hold each stretch for 5 seconds ; do  each stretch 5 times per set; do a set 3 times a day ; Status:Complete;   Done:  32PEO1809   · We recommend that you stretch your wrist muscles with flexing and extending exercises  Do this exercise 10 times in a row, 3 times a day ; Status:Complete;    Done: 60LTX8088   · Wrist splint; Status:Complete;   Done: 48ONW9101    Discussion/Summary    31-year-old male with a left wrist sprain  Recommended aggressive range of motion of the fingers and the wrist  We will get him a wrist splint to wear but I want him coming out of it several times a day for range of motion  Think he will be ready to return to work in about a week and will get him a release for this  He will follow up with me as needed  documentation was recorded using voice recognition software     Chief Complaint  Left wrist injury      History of Present Illness  HPI: 31-year-old male here today for evaluation of his left wrist  On November 4th, he was getting an air conditioner out of his window when it fell and trapped his hand between year condition in the window sill, hyperextending his wrist  He was seen and evaluated and given a splint  He removed the splint this morning when he showered  Notes stiffness and soreness in his hand and fingers, primarily dorsally  Denies any numbness or tingling  He works as a  and does not feel ready to do that typing that is required of him as his fingers are very stiff and sore on motion  In regards to his hip, that has resolved nearly completely  Review of Systems    Constitutional: No fever or chills, feels well, no tiredness, no recent weight loss or weight gain     Eyes: No complaints of red eyes, no eyesight problems  ENT: no complaints of loss of hearing, no nosebleeds, no sore throat  Cardiovascular: No complaints of chest pain, no palpitations, no leg claudication or lower extremity edema  Respiratory: No complaints of shortness of breath, no wheezing, no cough  Gastrointestinal: No complaints of abdominal pain, no constipation, no nausea or vomiting, no diarrhea or bloody stools  Genitourinary: No complaints of dysuria or incontinence, no hesitancy, no nocturia  Musculoskeletal: limb pain-- and-- limb swelling, but-- as noted in HPI  Integumentary: No complaints of skin rash or lesion, no itching or dry skin, no skin wounds  Neurological: No complaints of headache, no confusion, no numbness or tingling, no dizziness  Psychiatric: No suicidal thoughts, no anxiety, no depression  Endocrine: No muscle weakness, no frequent urination, no excessive thirst, no feelings of weakness  ROS reviewed  Active Problems  1  Acute bronchitis with obstruction (466 0) (J20 9)   2  Acute gastroenteritis (558 9) (K52 9)   3  Contusion of left hip, initial encounter (924 01) (S70 02XA)   4  Essential hypertension (401 9) (I10)   5  Fatigue (780 79) (R53 83)   6  Hyperlipidemia (272 4) (E78 5)   7  Mild persistent asthma without complication (548 52) (M22 65)   8  Morbid obesity due to excess calories (278 01) (E66 01)   9  Primary osteoarthritis of right knee (715 16) (M17 11)   10  Psoriasis (696 1) (L40 9)   11  Type 2 diabetes mellitus with hyperglycemia (250 00) (E11 65)   12  Vitamin B12 deficiency (266 2) (E53 8)   13   Vitamin D deficiency (268 9) (E55 9)    Past Medical History   · History of Abdominal pain, generalized (789 07) (R10 84)   · History of Acute gastritis without hemorrhage (535 00) (K29 00)   · History of asthma (V12 69) (Z87 09)   · History of diarrhea (V12 79) (B31 060)   · History of fatigue (V13 89) (C21 335)   · History of viral gastroenteritis (V12 09) (Z86 19)   · History of Impaired fasting glucose (790 21) (R73 01)   · History of Right knee pain (719 46) (M25 561)   · History of Weight gain (783 1) (R63 5)    Surgical History   · History of Gastric Surgery For Morbid Obesity Bypass With Samuel-en-Y   · History of Hernia Repair    Family History  Mother    · Family history of cerebrovascular accident (CVA) (V17 1) (Z82 3)  Father    · Family history of hypertension (V17 49) (Z82 49)    Social History   · Never A Smoker   · Social drinker    Current Meds   1  Atorvastatin Calcium 10 MG Oral Tablet; take 1 tablet by mouth every day; Therapy: 37MSI9015 to (Evaluate:83Yzl0750)  Requested for: 82Pts4609; Last   Rx:31Osd9293 Ordered   2  Cyanocobalamin 1000 MCG/ML Injection Solution; INJECT 1 ML INTRAMUSCULARLY   ONCE A MONTH; Recurring Schedule:02Jun2017 to (Exp:02Jun2018); Status: IN   PROGRESS - Order Generated Ordered   3  Cyanocobalamin 1000 MCG/ML Injection Solution; INJECT 1 ML INTRAMUSCULARLY   ONCE A MONTH; To Be Done: 63Oax5781; Status: HOLD FOR   - Administration Ordered   4  Cyanocobalamin 1000 MCG/ML Injection Solution; INJECT 1 ML INTRAMUSCULARLY   ONCE A MONTH; To Be Done: 50NJA9231; Status: HOLD FOR   - Administration Ordered   5  Cyanocobalamin 1000 MCG/ML Injection Solution; INJECT 1 ML INTRAMUSCULARLY   ONCE A MONTH; To Be Done: 85EZE8586; Status: HOLD FOR   - Administration Ordered   6  Cyanocobalamin 1000 MCG/ML Injection Solution; INJECT 1 ML INTRAMUSCULARLY   ONCE A MONTH; To Be Done: 07COZ9628; Status: HOLD FOR   - Administration Ordered   7  Cyanocobalamin 1000 MCG/ML Injection Solution; INJECT 1 ML INTRAMUSCULARLY   ONCE A MONTH; To Be Done: 89Gnw1007; Status: HOLD FOR   - Administration Ordered   8  Levalbuterol HCl - 1 25 MG/3ML Inhalation Nebulization Solution; USE 1 VIAL IN   NEBULIZER 3 TIMES A DAY PRN; Therapy: 03YFU0601 to (Last Rx:92Dnw0241)  Requested for: 84LIC1186 Ordered   9  Lisinopril 10 MG Oral Tablet;  Take 1 tablet daily; Therapy: 12YQZ4726 to (Evaluate:71Xxv6891)  Requested for: 80Ftb9595; Last   Rx:02Jun2017 Ordered   10  MetFORMIN HCl - 500 MG Oral Tablet; take 1 tablet bid after meals; Therapy: 84PSX2866 to (Last Rx:11May2017)  Requested for: 07Orh2356 Ordered   11  Metoprolol Succinate ER 50 MG Oral Tablet Extended Release 24 Hour; take 1 tablet by    mouth daily; Therapy: 22HYP0213 to (Evaluate:29Nho8201)  Requested for: 13Wcp4465; Last    Rx:02Jun2017 Ordered   12  Multiple Vitamins Oral Tablet; take 1 tab daily; Therapy: 11Dbf6778 to Recorded   13  Proventil  (90 Base) MCG/ACT Inhalation Aerosol Solution; TAKE 2 PUFFS BY    MOUTH EVERY 6 HOURS AS NEEDED FOR SHORTNESS OF BREATH; Therapy: 89KTR6974 to (Evaluate:92Ljs1700)  Requested for: 53UPO3365; Last    Rx:12May2017 Ordered   14  Symbicort 160-4 5 MCG/ACT Inhalation Aerosol; INHALE 2 PUFFS TWICE DAILY  RINSE MOUTH AFTER USE; Therapy: 36ELV9245 to (Last Rx:02Jun2017)  Requested for: 48Zxa4288 Ordered   15  Vitamin D (Ergocalciferol) 35837 UNIT Oral Capsule; TAKE 1 CAPSULE BY MOUTH    EVERY WEEK; Therapy: 74SPH9287 to (Evaluate:96Dyr4608)  Requested for: 00Zvn1388; Last    Rx:59Kun0623 Ordered    The medication list was reviewed and updated today  Allergies  1  Bactrim TABS    Vitals   Recorded: 07MCW0379 09:11AM Recorded: 44YKN0139 50:55MS   Systolic 302    Diastolic 89    Height  6 ft 1 in   Weight  370 lb 4 00 oz   BMI Calculated  48 85   BSA Calculated  2 79     Physical Exam    Left Wrist: Appearance: swelling  Diffuse tenderness on the dorsal aspect of the wrist  ROM: limited in all planes Flexion: painful  Extension: 4/5-- and-- painful  Thumb extension was painful  negative midcarpal instability      Results/Data  I personally reviewed the films/images/results in the office today  My interpretation follows  X-ray Review X-rays of the left wrist are reviewed  No fractures or dislocations are noted        Message  Return to work or school:   Salud Rodrigues is under my professional care   He was seen in my office on 11/7/17    He is not able to return to work until 11/16/17           Signatures   Electronically signed by : Hugh Bradford MD; Nov 7 2017  9:31AM EST                       (Author)

## 2017-11-17 ENCOUNTER — GENERIC CONVERSION - ENCOUNTER (OUTPATIENT)
Dept: OTHER | Facility: OTHER | Age: 52
End: 2017-11-17

## 2018-01-11 NOTE — MISCELLANEOUS
Message  Return to work or school:   Forney Cousin is under my professional care   He was seen in my office on 11/7/17    He is not able to return to work until 11/16/17           Signatures   Electronically signed by : Karen Barragan MD; Nov 7 2017  9:31AM EST                       (Author)

## 2018-01-11 NOTE — MISCELLANEOUS
Message  Return to work or school:   Iwona Castaneda is under my professional care   He was seen in my office on 10/4/17   He is able to return to work on  10/9/17            Signatures   Electronically signed by : Rashaad Atkins MD; Oct  4 2017  9:22AM EST                       (Author)

## 2018-01-12 VITALS
OXYGEN SATURATION: 94 % | HEART RATE: 88 BPM | TEMPERATURE: 98.2 F | DIASTOLIC BLOOD PRESSURE: 94 MMHG | HEIGHT: 73 IN | WEIGHT: 315 LBS | BODY MASS INDEX: 41.75 KG/M2 | RESPIRATION RATE: 16 BRPM | SYSTOLIC BLOOD PRESSURE: 156 MMHG

## 2018-01-13 VITALS — DIASTOLIC BLOOD PRESSURE: 80 MMHG | HEART RATE: 91 BPM | SYSTOLIC BLOOD PRESSURE: 146 MMHG

## 2018-01-13 VITALS
RESPIRATION RATE: 18 BRPM | BODY MASS INDEX: 41.75 KG/M2 | WEIGHT: 315 LBS | HEART RATE: 86 BPM | SYSTOLIC BLOOD PRESSURE: 140 MMHG | HEIGHT: 73 IN | TEMPERATURE: 97.9 F | DIASTOLIC BLOOD PRESSURE: 90 MMHG

## 2018-01-13 VITALS
WEIGHT: 315 LBS | SYSTOLIC BLOOD PRESSURE: 140 MMHG | DIASTOLIC BLOOD PRESSURE: 89 MMHG | BODY MASS INDEX: 41.75 KG/M2 | HEIGHT: 73 IN

## 2018-01-14 VITALS
TEMPERATURE: 98.2 F | RESPIRATION RATE: 18 BRPM | WEIGHT: 315 LBS | HEIGHT: 73 IN | BODY MASS INDEX: 41.75 KG/M2 | DIASTOLIC BLOOD PRESSURE: 84 MMHG | HEART RATE: 82 BPM | SYSTOLIC BLOOD PRESSURE: 140 MMHG

## 2018-01-14 VITALS
BODY MASS INDEX: 41.75 KG/M2 | TEMPERATURE: 98.4 F | WEIGHT: 315 LBS | HEIGHT: 73 IN | DIASTOLIC BLOOD PRESSURE: 90 MMHG | HEART RATE: 88 BPM | RESPIRATION RATE: 16 BRPM | SYSTOLIC BLOOD PRESSURE: 156 MMHG

## 2018-01-14 NOTE — RESULT NOTES
Message   Call patient  Blood test showed elevated blood sugar level at 224, very low Vit D level, Vit B12  Reschedule followup visit ASAP  to review labs and discuss treatment ( he has ov 3/8/16)  Verified Results  (1) CBC/PLT/DIFF 35CDV4923 08:20AM [facility] Order Number: [id number]     Order Number: [id number]     Test Name Result Flag Reference   WBC COUNT 8 34 Thousand/uL  4 31-10 16   RBC COUNT 5 01 Million/uL  3 88-5 62   HEMOGLOBIN 14 9 g/dL  12 0-17 0   HEMATOCRIT 44 7 %  36 5-49 3   MCV 89 fL  82-98   MCH 29 7 pg  26 8-34 3   MCHC 33 3 g/dL  31 4-37 4   RDW 13 3 %  11 6-15 1   MPV 10 7 fL  8 9-12 7   PLATELET COUNT 674 Thousands/uL  149-390   nRBC AUTOMATED 0 /100 WBCs     NEUTROPHILS RELATIVE PERCENT 71 %  43-75   LYMPHOCYTES RELATIVE PERCENT 17 %  14-44   MONOCYTES RELATIVE PERCENT 8 %  4-12   EOSINOPHILS RELATIVE PERCENT 3 %  0-6   BASOPHILS RELATIVE PERCENT 1 %  0-1   NEUTROPHILS ABSOLUTE COUNT 5 91 Thousands/µL  1 85-7 62   LYMPHOCYTES ABSOLUTE COUNT 1 39 Thousands/µL  0 60-4 47   MONOCYTES ABSOLUTE COUNT 0 65 Thousand/µL  0 17-1 22   EOSINOPHILS ABSOLUTE COUNT 0 28 Thousand/µL  0 00-0 61   BASOPHILS ABSOLUTE COUNT 0 04 Thousands/µL  0 00-0 10     (1) COMPREHENSIVE METABOLIC PANEL 78VND2068 03:52FU [facility] Order Number: [id number]      National Kidney Disease Education Program recommendations are as follows:  GFR calculation is accurate only with a steady state creatinine  Chronic Kidney disease less than 60 ml/min/1 73 sq  meters  Kidney failure less than 15 ml/min/1 73 sq  meters       Test Name Result Flag Reference   GLUCOSE,RANDM 224 mg/dL H    SODIUM 139 mmol/L  136-145   POTASSIUM 4 1 mmol/L  3 5-5 3   CHLORIDE 101 mmol/L  100-108   CARBON DIOXIDE 28 mmol/L  21-32   ANION GAP (CALC) 10 mmol/L  4-13   BLOOD UREA NITROGEN 6 mg/dL  5-25   CREATININE 0 82 mg/dL  0 60-1 30   Standardized to IDMS reference method   CALCIUM 8 2 mg/dL L 8 3-10 1   BILI, TOTAL 0 82 mg/dL  0 20-1 00   ALK PHOSPHATAS 90 U/L     ALT (SGPT) 22 U/L  12-78   AST(SGOT) 15 U/L  5-45   ALBUMIN 3 3 g/dL L 3 5-5 0   TOTAL PROTEIN 6 3 g/dL L 6 4-8 2   eGFR Non-African American      >60 0 ml/min/1 73sq m     (1) TSH 36JTN4018 08:20AM Lil[facility]   TW Order Number: [id number]    Patients undergoing fluorescein dye angiography may retain small amounts of fluorescein in the body for 48-72 hours post procedure  Samples containing fluorescein can produce falsely depressed TSH values  If the patient had this procedure,a specimen should be resubmitted post fluorescein clearance  Test Name Result Flag Reference   TSH 2 450 uIU/mL  0 358-3 740     (1) LIPID PANEL, FASTING 02Mar2016 08:20AM [facility] Order Number: [id number]      Triglyceride:         Normal              <150 mg/dl       Borderline High    150-199 mg/dl       High               200-499 mg/dl       Very High          >499 mg/dl  Cholesterol:         Desirable        <200 mg/dl      Borderline High  200-239 mg/dl      High             >239 mg/dl  HDL Cholesterol:        High    >59 mg/dL      Low     <41 mg/dL  LDL CALCULATED:    This screening LDL is a calculated result  It does not have the accuracy of the Direct Measured LDL in the monitoring of patients with hyperlipidemia and/or statin therapy  Direct Measure LDL (PIU848) must be ordered separately in these patients       Test Name Result Flag Reference   CHOLESTEROL 219 mg/dL H    HDL,DIRECT 42 mg/dL  40-60   LDL CHOLESTEROL CALCULATED 150 mg/dL H 0-100   TRIGLYCERIDES 134 mg/dL  <=150     (1) IRON 27XDZ1687 08:20AM [facility] Order Number: [id number]     Test Name Result Flag Reference   IRON 78 ug/dL       (1) VITAMIN D 25-HYDROXY 28QDW1015 08:20AM [facility] Order Number: [id number]    TW Order Number: [id number]     Test Name Result Flag Reference   VIT D 25-HYDROX 12 6 ng/mL L 30 0-100 0     (1) VITAMIN B12 27HQP7211 08:20AM [facility] Order Number: [id number]     Test Name Result Flag Reference   VITAMIN B12 187 pg/mL  100-900

## 2018-01-15 VITALS
HEART RATE: 110 BPM | RESPIRATION RATE: 20 BRPM | SYSTOLIC BLOOD PRESSURE: 150 MMHG | BODY MASS INDEX: 41.75 KG/M2 | WEIGHT: 315 LBS | HEIGHT: 73 IN | TEMPERATURE: 97.3 F | OXYGEN SATURATION: 95 % | DIASTOLIC BLOOD PRESSURE: 100 MMHG

## 2018-01-15 NOTE — RESULT NOTES
Verified Results  (1) CBC/PLT/DIFF [id number] 08:36AM Ruthy Casiano    Order Number: [id number]     Test Name Result Flag Reference   WBC COUNT 8 29 Thousand/uL  4 31-10 16   RBC COUNT 5 01 Million/uL  3 88-5 62   HEMOGLOBIN 15 4 g/dL  12 0-17 0   HEMATOCRIT 45 6 %  36 5-49 3   MCV 91 fL  82-98   MCH 30 7 pg  26 8-34 3   MCHC 33 8 g/dL  31 4-37 4   RDW 12 4 %  11 6-15 1   MPV 10 5 fL  8 9-12 7   PLATELET COUNT 889 Thousands/uL  149-390   nRBC AUTOMATED 0 /100 WBCs     NEUTROPHILS RELATIVE PERCENT 69 %  43-75   LYMPHOCYTES RELATIVE PERCENT 18 %  14-44   MONOCYTES RELATIVE PERCENT 7 %  4-12   EOSINOPHILS RELATIVE PERCENT 5 %  0-6   BASOPHILS RELATIVE PERCENT 1 %  0-1   NEUTROPHILS ABSOLUTE COUNT 5 61 Thousands/? ??L  1 85-7 62   LYMPHOCYTES ABSOLUTE COUNT 1 52 Thousands/? ??L  0 60-4 47   MONOCYTES ABSOLUTE COUNT 0 56 Thousand/? ??L  0 17-1 22   EOSINOPHILS ABSOLUTE COUNT 0 44 Thousand/? ??L  0 00-0 61   BASOPHILS ABSOLUTE COUNT 0 07 Thousands/? ??L  0 00-0 10     (1) COMPREHENSIVE METABOLIC PANEL [id number] 50:40HT Carrie Stamford Order Number: [id number]     Test Name Result Flag Reference   SODIUM 138 mmol/L  136-145   POTASSIUM 4 1 mmol/L  3 5-5 3   CHLORIDE 102 mmol/L  100-108   CARBON DIOXIDE 27 mmol/L  21-32   ANION GAP (CALC) 9 mmol/L  4-13   BLOOD UREA NITROGEN 8 mg/dL  5-25   CREATININE 0 86 mg/dL  0 60-1 30   Standardized to IDMS reference method   CALCIUM 8 9 mg/dL  8 3-10 1   BILI, TOTAL 0 67 mg/dL  0 20-1 00   ALK PHOSPHATAS 98 U/L     ALT (SGPT) 32 U/L  12-78   AST(SGOT) 32 U/L  5-45   ALBUMIN 3 4 g/dL L 3 5-5 0   TOTAL PROTEIN 7 1 g/dL  6 4-8 2   eGFR Non-African American      >60 0 ml/min/1 73sq [facility] Disease Education Program recommendations are as follows:  GFR calculation is accurate only with a steady state creatinine  Chronic Kidney disease less than 60 ml/min/1 73 sq  meters  Kidney failure less than 15 ml/min/1 73 sq  meters     GLUCOSE FASTING 217 mg/dL H 65-99     (1) TSH WITH FT4 REFLEX [id number] 08:36AM [employer] [facility] Order Number: [id number]     Test Name Result Flag Reference   TSH 2 680 uIU/mL  0 358-3 740   Patients undergoing fluorescein dye angiography may retain small amounts of fluorescein in the body for 48-72 hours post procedure  Samples containing fluorescein can produce falsely depressed TSH values  If the patient had this procedure,a specimen should be resubmitted post fluorescein clearance  (1) HEMOGLOBIN A1C [id number] 08:36AM [employer] [facility] Order Number: [id number]     Test Name Result Flag Reference   HEMOGLOBIN A1C 8 0 % H 4 2-6 3   EST  AVG  GLUCOSE 183 mg/dl       (1) VITAMIN B12 [id number] 08:36AM [facility] Order Number: [id number]     Test Name Result Flag Reference   VITAMIN B12 217 pg/mL  100-900     (1) VITAMIN D 25-HYDROXY [id number] 08:36AM [facility] [facility] Order Number: [id number]     Test Name Result Flag Reference   VIT D 25-HYDROX 48 1 ng/mL  30 0-100 0   This assay is a certified procedure of the CDC Vitamin D Standardization Certification Program (VDSCP)     Deficiency <20ng/ml   Insufficiency 20-30ng/ml   Sufficient  ng/ml     *Patients undergoing fluorescein dye angiography may retain small amounts of fluorescein in the body for 48-72 hours post procedure  Samples containing fluorescein can produce falsely elevated Vitamin D values  If the patient had this procedure, a specimen should be resubmitted post fluorescein clearance         Plan  Type 2 diabetes mellitus with hyperglycemia    · MetFORMIN HCl - 500 MG Oral Tablet; take 1 tablet bid after meals

## 2018-01-16 NOTE — RESULT NOTES
Message   US abdomen showed fatty liver, gallstone but no acute infection signs  If pt still has symptoms, let me know  Verified Results  * US ABDOMEN COMPLETE 01Apr2017 07:46AM Truman Ohara    Order Number: [id number]    - Patient Instructions: To schedule this appointment, please contact Central Scheduling at [phone]  Test Name Result Flag Reference   US ABDOMEN COMPLETE (Report)     ABDOMEN ULTRASOUND, COMPLETE     INDICATION: 53-year-old male with nausea and vomiting  COMPARISON: None  TECHNIQUE:  Real-time ultrasound of the abdomen was performed with a curvilinear transducer with both volumetric sweeps and still imaging techniques  FINDINGS:     PANCREAS: Visualized portions of the pancreas are within normal limits  AORTA AND IVC: Visualized portions are normal for patient age  LIVER:   Size: Moderately enlarged  The liver measures 21 3 cm in the midclavicular line  Contour: Surface contour is smooth  Parenchyma: There is marked diffuse increased echogenicity with smooth echotexture and significant beam attenuation with loss of periportal echogenicity  Most consistent with severe hepatic steatosis  No evidence of suspicious mass  The main portal vein is patent and hepatopetal       BILIARY:   The gallbladder is normal in caliber  No wall thickening or pericholecystic fluid  Single dependent mobile calculus without sludge  Sonographic Ирина Pick sign is negative  No intrahepatic biliary dilatation  CBD measures 3 mm  No choledocholithiasis  KIDNEY:    Right kidney measures 11 5 x 5 3 cm  Within normal limits  Left kidney measures 11 4 x 5 6 cm  Within normal limits  SPLEEN:    Measures 10 9 x 4 9 x 10 3 cm  Within normal limits  ASCITES: None  IMPRESSION:       1  Hepatomegaly with severe hepatic steatosis  2  Gallstone with no acute cholecystitis or biliary ductal obstruction         Workstation performed: [id number] Signed by:   Khushboo Decker MD   4/4/17

## 2018-01-16 NOTE — MISCELLANEOUS
Provider Comments  Provider Comments:   No show for appt today  Signatures   Electronically signed by :  Macy Hernandez MD; Feb 11 2016 11:29AM EST                       (Review)

## 2018-01-16 NOTE — PROGRESS NOTES
Chief Complaint  patient came in to get his glucose meter and to see how it works, I spent a few mins with him going over the device and how it works  We took his blood sugar and it was 216 he had not eaten yet this morning  When patient left he said he understood how it works and he would keep a log of his blood sugar for the next week, he is in for a routine visit on 5/26  He will bring the log them  Patient was given an Accu-Chek Brianna connect device which has 10 strips and 10 lancets  I encouraged the patient to call the office if he had any questions  Active Problems    1  Essential hypertension (401 9) (I10)   2  Fatigue (780 79) (R53 83)   3  Hyperlipidemia (272 4) (E78 5)   4  Impaired fasting glucose (790 21) (R73 01)   5  Mild persistent asthma without complication (179 40) (H67 73)   6  Morbid obesity due to excess calories (278 01) (E66 01)   7  Primary osteoarthritis of right knee (715 16) (M17 11)   8  Psoriasis (696 1) (L40 9)   9  Type 2 diabetes mellitus with hyperglycemia (250 00) (E11 65)   10  Vitamin B12 deficiency (266 2) (E53 8)   11  Vitamin D deficiency (268 9) (E55 9)    Current Meds   1  Atorvastatin Calcium 10 MG Oral Tablet; take 1 tablet by mouth every day; Therapy: 71TPQ5594 to (Evaluate:16Jun2017)  Requested for: 63THU8401; Last   Rx:18Nov2016 Ordered   2  MetFORMIN HCl - 500 MG Oral Tablet; take 1 tablet bid after meals; Therapy: 87BLQ0550 to (Last Rx:55Bde6894)  Requested for: 60KJD5945 Ordered   3  Metoprolol Succinate ER 25 MG Oral Tablet Extended Release 24 Hour; Take 1 tablet   daily; Therapy: 18BMX3843 to (Evaluate:92Evc0398)  Requested for: 69LFM2326; Last   Rx:06Mar2017 Ordered   4  Multiple Vitamins Oral Tablet; take 1 tab daily; Therapy: 05Hwr3760 to Recorded   5  Proventil  (90 Base) MCG/ACT Inhalation Aerosol Solution; TAKE 2 PUFFS BY   MOUTH EVERY 6 HOURS AS NEEDED FOR SHORTNESS OF BREATH;    Therapy: 91MUE1821 to (Evaluate:25Kas7522)  Requested for: 32WAX9216; Last   Rx:81Zlj3388 Ordered   6  Symbicort 160-4 5 MCG/ACT Inhalation Aerosol; INHALE 2 PUFFS TWICE DAILY  RINSE MOUTH AFTER USE; Therapy: 69FPN2430 to (Last Rx:09Nov2016) Ordered   7  Vitamin D (Ergocalciferol) 00913 UNIT Oral Capsule; TAKE 1 CAPSULE BY MOUTH   EVERY WEEK; Therapy: 22QMB8379 to (Evaluate:57Rxy6881)  Requested for: 83SXD3923; Last   Rx:01Jan2017 Ordered    Allergies    1  Bactrim TABS    Future Appointments    Date/Time Provider Specialty Site   05/26/2017 09:00 AM Elicia Hodgkins, M D   Family Medicine [address] [place] [address] Drive     Signatures   Electronically signed by : MARLIN Ramon ; May 15 2017 12:51PM EST                       (Author)

## 2018-01-17 NOTE — PROGRESS NOTES
History of Present Illness  ED Outreach:   ED Visit Information  ED visit date: 11/04/2017  Diagnosis description: PAIN IN LEFT WRIST   [facility] name[facility]   Discharge status: Home  Number of ED visits to date: 2  ED severity: 3  In network  Outreach Information  Outreach not needed  Date finalized: 11/17/2017  Care Coordination  Emergent necessity not warranted by diagnosis  [facility] PCP  No follow up appointment with PCP  Not seen for follow up out of network  Patient with existing specialty follow up appointments in network  Comments:   PT LAYO w Ortho 11/7/2017        Signatures   Electronically signed by : Austin Lemons, ; Nov 17 2017 12:20PM EST                       (Author)

## 2018-01-30 ENCOUNTER — OFFICE VISIT (OUTPATIENT)
Dept: FAMILY MEDICINE CLINIC | Facility: CLINIC | Age: 53
End: 2018-01-30
Payer: COMMERCIAL

## 2018-01-30 VITALS
BODY MASS INDEX: 41.75 KG/M2 | TEMPERATURE: 97.8 F | OXYGEN SATURATION: 97 % | WEIGHT: 315 LBS | HEART RATE: 90 BPM | DIASTOLIC BLOOD PRESSURE: 84 MMHG | SYSTOLIC BLOOD PRESSURE: 136 MMHG | RESPIRATION RATE: 20 BRPM | HEIGHT: 73 IN

## 2018-01-30 DIAGNOSIS — K52.9 ACUTE GASTROENTERITIS: ICD-10-CM

## 2018-01-30 DIAGNOSIS — K58.0 IRRITABLE BOWEL SYNDROME WITH DIARRHEA: ICD-10-CM

## 2018-01-30 DIAGNOSIS — I10 ESSENTIAL HYPERTENSION: ICD-10-CM

## 2018-01-30 DIAGNOSIS — E11.9 TYPE 2 DIABETES MELLITUS WITHOUT COMPLICATION, WITHOUT LONG-TERM CURRENT USE OF INSULIN (HCC): Primary | ICD-10-CM

## 2018-01-30 PROCEDURE — 99214 OFFICE O/P EST MOD 30 MIN: CPT | Performed by: FAMILY MEDICINE

## 2018-01-30 PROCEDURE — 3079F DIAST BP 80-89 MM HG: CPT | Performed by: FAMILY MEDICINE

## 2018-01-30 PROCEDURE — 3075F SYST BP GE 130 - 139MM HG: CPT | Performed by: FAMILY MEDICINE

## 2018-01-30 RX ORDER — ERGOCALCIFEROL 1.25 MG/1
1 CAPSULE ORAL WEEKLY
COMMUNITY
Start: 2016-03-08 | End: 2018-03-09 | Stop reason: SDUPTHER

## 2018-01-30 RX ORDER — LISINOPRIL 10 MG/1
1 TABLET ORAL DAILY
COMMUNITY
Start: 2017-06-02 | End: 2018-03-12 | Stop reason: SDUPTHER

## 2018-01-30 RX ORDER — LEVALBUTEROL INHALATION SOLUTION 1.25 MG/3ML
SOLUTION RESPIRATORY (INHALATION)
COMMUNITY
Start: 2017-05-26 | End: 2018-02-08

## 2018-01-30 RX ORDER — BUDESONIDE AND FORMOTEROL FUMARATE DIHYDRATE 160; 4.5 UG/1; UG/1
2 AEROSOL RESPIRATORY (INHALATION) 2 TIMES DAILY
COMMUNITY
Start: 2016-11-09 | End: 2019-04-29 | Stop reason: ALTCHOICE

## 2018-01-30 RX ORDER — ALBUTEROL SULFATE 90 UG/1
AEROSOL, METERED RESPIRATORY (INHALATION)
COMMUNITY
Start: 2016-05-23 | End: 2019-04-19 | Stop reason: SDUPTHER

## 2018-01-30 NOTE — PROGRESS NOTES
Assessment/Plan: 1  Acute gastroenteritis - symptoms resolved  2 IBS with diarrhea- discussed diet with patient  Recommended to avoid fatty, fried foods, sodas, caffeine  Encouraged to stay well hydrated  Will check CMP, celiac disease panel  Recommended gastroenterology evaluation  3 HTN - stable  Continue Metoprolol ER 50 mg daily, Lisinopril 10 mg daily  Follow a low sodium diet  4 Type 2 DM - continue Metformin 500 mg twice daily  Follow a low carb diet  Encouraged regular exercise, weight reduction  Check labs: Hb A1C , urine microalbumin, lipid panel  Schedule follow-up visit in 3 months  Diagnoses and all orders for this visit:    Acute gastroenteritis    Irritable bowel syndrome with diarrhea  -     Comprehensive metabolic panel; Future  -     Celiac Disease Antibody Profile; Future  -     Ambulatory referral to Gastroenterology; Future  -     Comprehensive metabolic panel  -     Celiac Disease Antibody Profile    Essential hypertension    Type 2 diabetes mellitus without complication, without long-term current use of insulin (HCC)    Other orders  -     ergocalciferol (VITAMIN D2) 50,000 units; Take 1 capsule by mouth once a week  -     budesonide-formoterol (SYMBICORT) 160-4 5 mcg/act inhaler; Inhale 2 puffs 2 (two) times a day  -     albuterol (PROVENTIL HFA) 90 mcg/act inhaler; Inhale  -     MULTIPLE VITAMINS-CALCIUM PO; Take 1 tablet by mouth daily  -     levalbuterol (XOPENEX) 1 25 mg/3 mL nebulizer solution; Inhale  -     lisinopril (ZESTRIL) 10 mg tablet; Take 1 tablet by mouth daily  -     metFORMIN (GLUCOPHAGE) 500 mg tablet; Take 1 tablet by mouth 2 (two) times a day After meals          Subjective:      Patient ID: Virl Blood is a 50 y o  male  Patient presents to the office c/o having diarrhea, vomiting, fever last week for a few days  Symptoms improved this week  Requesting a note to go back to work  Patient would like to discuss IBS  symptoms   C/o intermittent diarrhea/loose bowel movements,  had blood in the stool a few times over the past weekend  No colonoscopy after age 50  He was not seen recently by gastroenterologist or colorectal surgeon  Denies hemorrhoids  No acute abdominal pain, nausea, heartburn  Patient has H/o HTN, Type 2 DM  Last blood work done in 5/17  Hemoglobin A1c was 8 0  Currently taking Metformin 500 mg twice daily  Patient states that he has had diarrhea prior to starting on Metformin  HTN - blood pressure remained stable on current blood pressure medications, including Metoprolol and Lisinopril  Denies CP, SOB, dizziness  The following portions of the patient's history were reviewed and updated as appropriate: allergies, current medications, past family history, past medical history, past social history, past surgical history and problem list     Review of Systems   Constitutional: Negative for activity change, appetite change, chills, fatigue and fever  HENT: Negative  Eyes: Negative  Respiratory: Negative for cough, chest tightness, shortness of breath and wheezing  Cardiovascular: Negative for chest pain, palpitations and leg swelling  Gastrointestinal: Positive for blood in stool and diarrhea  Negative for abdominal pain, nausea and rectal pain  As documented in HPI  Genitourinary: Negative for difficulty urinating, dysuria, frequency and hematuria  Musculoskeletal: Negative for arthralgias, joint swelling and myalgias  Neurological: Negative for dizziness, numbness and headaches  Hematological: Negative  Psychiatric/Behavioral: Negative  Objective:  Vitals:    01/30/18 0950   BP: 136/84   Pulse: 90   Resp: 20   Temp: 97 8 °F (36 6 °C)   SpO2: 97%          Physical Exam   Constitutional: He appears well-developed and well-nourished  Morbidly obese    HENT:   Head: Normocephalic and atraumatic     Mouth/Throat: Oropharynx is clear and moist    Eyes: Conjunctivae are normal  Pupils are equal, round, and reactive to light  Cardiovascular: Normal rate and normal heart sounds  No murmur heard  Pulmonary/Chest: Effort normal and breath sounds normal  No respiratory distress  He has no wheezes  He has no rales  Abdominal: Soft  Bowel sounds are normal  He exhibits no distension  There is no tenderness  Musculoskeletal: Normal range of motion  He exhibits no edema or deformity  Skin: Skin is warm and dry  No rash noted  Psychiatric: He has a normal mood and affect   Judgment normal

## 2018-02-08 ENCOUNTER — HOSPITAL ENCOUNTER (EMERGENCY)
Facility: HOSPITAL | Age: 53
Discharge: HOME | End: 2018-02-08
Attending: EMERGENCY MEDICINE | Admitting: EMERGENCY MEDICINE
Payer: COMMERCIAL

## 2018-02-08 ENCOUNTER — APPOINTMENT (EMERGENCY)
Dept: RADIOLOGY | Facility: HOSPITAL | Age: 53
End: 2018-02-08
Payer: COMMERCIAL

## 2018-02-08 ENCOUNTER — APPOINTMENT (EMERGENCY)
Dept: NON INVASIVE DIAGNOSTICS | Facility: HOSPITAL | Age: 53
End: 2018-02-08
Payer: COMMERCIAL

## 2018-02-08 VITALS
WEIGHT: 315 LBS | SYSTOLIC BLOOD PRESSURE: 185 MMHG | RESPIRATION RATE: 18 BRPM | TEMPERATURE: 97.7 F | HEART RATE: 80 BPM | OXYGEN SATURATION: 99 % | BODY MASS INDEX: 47.5 KG/M2 | DIASTOLIC BLOOD PRESSURE: 84 MMHG

## 2018-02-08 DIAGNOSIS — S80.12XA CONTUSION OF MULTIPLE SITES OF LEFT LOWER EXTREMITY, INITIAL ENCOUNTER: ICD-10-CM

## 2018-02-08 DIAGNOSIS — M79.605 LEFT LEG PAIN: Primary | ICD-10-CM

## 2018-02-08 PROCEDURE — 73630 X-RAY EXAM OF FOOT: CPT

## 2018-02-08 PROCEDURE — 73590 X-RAY EXAM OF LOWER LEG: CPT

## 2018-02-08 PROCEDURE — 93971 EXTREMITY STUDY: CPT

## 2018-02-08 PROCEDURE — 99284 EMERGENCY DEPT VISIT MOD MDM: CPT

## 2018-02-08 NOTE — ED PROVIDER NOTES
History  Chief Complaint   Patient presents with    Fall     Pt fell on ice on Friday and presents with left leg, knee and ankle pain  No LOC  No thinners       79-year-old male who presents for evaluation of left leg pain after fall occurred 6 days ago  Patient states was at home when he slipped on ice  He is unsure exactly of how he landed  He denies any head injury or loss of consciousness  He describes a gradual onset of pain to his LLE along his left knee and ankle that is throbbing, rated 8/10  Halley Paco He has gradually noticed significant bruising and mild swelling of his LLE and he does state that he sits for long periods of time  He is s/p gastric bypass and has been taking tylenol for pain  Ambulating without assistance  No history of DVT/PE, recent travel or surgery or prolonged immobilization, CP or SOB  Prior to Admission Medications   Prescriptions Last Dose Informant Patient Reported? Taking?    MULTIPLE VITAMINS-CALCIUM PO  Self Yes Yes   Sig: Take 1 tablet by mouth daily   albuterol (PROVENTIL HFA) 90 mcg/act inhaler  Self Yes Yes   Sig: Inhale   atorvastatin (LIPITOR) 10 mg tablet  Self Yes Yes   Sig: Take 10 mg by mouth daily   budesonide-formoterol (SYMBICORT) 160-4 5 mcg/act inhaler  Self Yes Yes   Sig: Inhale 2 puffs 2 (two) times a day   ergocalciferol (VITAMIN D2) 50,000 units  Self Yes Yes   Sig: Take 1 capsule by mouth once a week   lisinopril (ZESTRIL) 10 mg tablet  Self Yes Yes   Sig: Take 1 tablet by mouth daily   metFORMIN (GLUCOPHAGE) 500 mg tablet  Self Yes Yes   Sig: Take 1 tablet by mouth 2 (two) times a day After meals   metoprolol succinate (TOPROL-XL) 50 mg 24 hr tablet  Self Yes Yes   Sig: Take 50 mg by mouth daily      Facility-Administered Medications: None       Past Medical History:   Diagnosis Date    Asthma     Diabetes mellitus (HCC)     Gastric bypass status for obesity     Hyperlipidemia     Hypertension     Obesity        Past Surgical History: Procedure Laterality Date    CARPAL TUNNEL RELEASE      bilateral    GASTRIC BYPASS      HERNIA REPAIR      TONSILLECTOMY         History reviewed  No pertinent family history  I have reviewed and agree with the history as documented  Social History   Substance Use Topics    Smoking status: Light Tobacco Smoker     Types: Cigars    Smokeless tobacco: Never Used      Comment: occassional    Alcohol use Yes      Comment: social        Review of Systems   Constitutional: Negative for chills and fever  Respiratory: Negative for shortness of breath  Cardiovascular: Negative for chest pain, palpitations and leg swelling  Musculoskeletal: Positive for arthralgias and joint swelling  Skin: Positive for color change  Negative for wound  Neurological: Negative for weakness and numbness  Physical Exam  ED Triage Vitals [02/08/18 1230]   Temperature Pulse Respirations Blood Pressure SpO2   97 7 °F (36 5 °C) 83 18 (!) 196/100 99 %      Temp Source Heart Rate Source Patient Position - Orthostatic VS BP Location FiO2 (%)   Oral Monitor Sitting Left arm --      Pain Score       8           Orthostatic Vital Signs  Vitals:    02/08/18 1230 02/08/18 1253   BP: (!) 196/100 (!) 185/84   Pulse: 83 80   Patient Position - Orthostatic VS: Sitting        Physical Exam   Constitutional: He is oriented to person, place, and time  He appears well-developed and well-nourished  Non-toxic appearance  He does not have a sickly appearance  He does not appear ill  No distress  Well appearing male   HENT:   Head: Normocephalic and atraumatic  Right Ear: Hearing and external ear normal    Left Ear: Hearing and external ear normal    Nose: Nose normal    Mouth/Throat: Oropharynx is clear and moist    Eyes: Conjunctivae and EOM are normal  Pupils are equal, round, and reactive to light  Neck: Normal range of motion  Neck supple  Cardiovascular: Normal rate, regular rhythm and normal heart sounds    Exam reveals no gallop and no friction rub  No murmur heard  Pulses:       Dorsalis pedis pulses are 1+ on the right side, and 1+ on the left side  Pulmonary/Chest: Effort normal and breath sounds normal  No respiratory distress  He has no decreased breath sounds  He has no wheezes  He has no rhonchi  He has no rales  Musculoskeletal: Normal range of motion  Left knee: He exhibits ecchymosis and bony tenderness  He exhibits normal range of motion, no swelling, no effusion, no deformity, no laceration, no erythema, normal alignment, no LCL laxity, normal patellar mobility and no MCL laxity  Tenderness found  Medial joint line and lateral joint line tenderness noted  Left ankle: He exhibits ecchymosis  He exhibits normal range of motion, no swelling, no deformity, no laceration and normal pulse  Tenderness  Lateral malleolus and medial malleolus tenderness found  No head of 5th metatarsal and no proximal fibula tenderness found  Achilles tendon exhibits no pain and no defect  Left lower leg: He exhibits tenderness and bony tenderness  He exhibits no swelling, no edema, no deformity and no laceration  Legs:       Left foot: There is tenderness, bony tenderness and swelling (non-pitting pedal edema dorsum of left foot)  There is normal range of motion, normal capillary refill, no crepitus, no deformity and no laceration  Feet:    Neurological: He is alert and oriented to person, place, and time  Skin: Skin is warm and dry  No rash noted  He is not diaphoretic  Psychiatric: He has a normal mood and affect  Nursing note and vitals reviewed  ED Medications  Medications - No data to display    Diagnostic Studies  Results Reviewed     None                 VAS lower limb venous duplex study, unilateral/limited   Final Result by Debbi Correa MD (02/09 1530)      XR tibia fibula 2 views LEFT   Final Result by Jaren Morin DO (02/08 1703)      No acute osseous abnormality  Workstation performed: [id number]         XR foot 3+ views LEFT   Final Result by Samantha Ballesteros DO (02/08 1702)      No acute osseous abnormality  Workstation performed: [id number]                    Procedures  Procedures       Phone Contacts  ED Phone Contact    ED Course  ED Course as of Feb 13 1614   Thu Feb 08, 2018   1508 US negative for DVT of LLE per Giorgi                                [facility]  Number of Diagnoses or Management Options  Contusion of multiple sites of left lower extremity, initial encounter: new and requires workup  Left leg pain: new and requires workup  Diagnosis management comments:   49 yo M with left leg pain after a fall  Has pain along left knee, distal tibia, ankle and foot  There are diffuse ecchymoses along his left knee, calf, ankle and foot with mild swelling of his left foot  xrays were negative for acute fracture  US of LLE was negative for DVT  Patient was instructed to rest, elevate, ice and continue tylenol for pain  F/u with ortho and PCP and RTED for worsening  Patient verbalizes understanding and agrees with plan         Amount and/or Complexity of Data Reviewed  Tests in the radiology section of CPT®: ordered and reviewed  Discussion of test results with the performing providers: yes  Independent visualization of images, tracings, or specimens: yes    Patient Progress  Patient progress: stable    CritCare Time    Disposition  Final diagnoses:   Left leg pain   Contusion of multiple sites of left lower extremity, initial encounter     Time reflects when diagnosis was documented in both MDM as applicable and the Disposition within this note     Time User Action Codes Description Comment    2/8/2018  5:06 PM Cyndee Gipson Add [M79 605] Left leg pain     2/8/2018  5:06 PM Carla Ascencio Add [S80 12XA] Contusion of multiple sites of left lower extremity, initial encounter       ED Disposition     ED Disposition Condition Comment    Discharge  Lisa Reas discharge to home/self care  Condition at discharge: Good        Follow-up Information     Follow up With Specialties Details Why Contact Info Additional Information    Gaynelle Sever, MD Family Medicine Schedule an appointment as soon as possible for a visit  [address] [facility]  [phone]       [address] [place] [address] Orthopedic Surgery Schedule an appointment as soon as possible for a visit If symptoms worsen [address] [id number] [phone]     [address] [place] [address] Emergency Department Emergency Medicine  If symptoms worsen [address]  [phone]  ED, [address] [place] [address], [id number]        Discharge Medication List as of 2/8/2018  5:07 PM      CONTINUE these medications which have NOT CHANGED    Details   albuterol (PROVENTIL HFA) 90 mcg/act inhaler Inhale, Starting Mon 5/23/2016, Historical Med      atorvastatin (LIPITOR) 10 mg tablet Take 10 mg by mouth daily, Starting Fri 11/18/2016, Historical Med      budesonide-formoterol (SYMBICORT) 160-4 5 mcg/act inhaler Inhale 2 puffs 2 (two) times a day, Starting Wed 11/9/2016, Historical Med      ergocalciferol (VITAMIN D2) 50,000 units Take 1 capsule by mouth once a week, Starting Tue 3/8/2016, Historical Med      lisinopril (ZESTRIL) 10 mg tablet Take 1 tablet by mouth daily, Starting Fri 6/2/2017, Historical Med      metFORMIN (GLUCOPHAGE) 500 mg tablet Take 1 tablet by mouth 2 (two) times a day After meals, Starting Thu 5/11/2017, Historical Med      metoprolol succinate (TOPROL-XL) 50 mg 24 hr tablet Take 50 mg by mouth daily, Starting Fri 6/2/2017, Historical Med      MULTIPLE VITAMINS-CALCIUM PO Take 1 tablet by mouth daily, Starting Tue 2/23/2016, Historical Med           No discharge procedures on file      ED Provider  Electronically Signed by           Willie Choe PA-C  02/13/18 0333

## 2018-02-08 NOTE — DISCHARGE INSTRUCTIONS
Contusion in Adults   WHAT YOU NEED TO KNOW:   A contusion is a bruise that appears on your skin after an injury  A bruise happens when small blood vessels tear but skin does not  When blood vessels tear, blood leaks into nearby tissue, such as soft tissue or muscle  DISCHARGE INSTRUCTIONS:   Return to the emergency department if:   · You have new trouble moving the injured area  · You have tingling or numbness in or near the injured area  · Your hand or foot below the bruise gets cold or turns pale  Contact your healthcare provider if:   · You find a new lump in the injured area  · Your symptoms do not improve with treatment after 4 to 5 days  · You have questions or concerns about your condition or care  Medicines: You may need any of the following:  · NSAIDs  help decrease swelling and pain or fever  This medicine is available with or without a doctor's order  NSAIDs can cause stomach bleeding or kidney problems in certain people  If you take blood thinner medicine, always ask your healthcare provider if NSAIDs are safe for you  Always read the medicine label and follow directions  · Prescription pain medicine  may be given  Do not wait until the pain is severe before you take your medicine  · Take your medicine as directed  Contact your healthcare provider if you think your medicine is not helping or if you have side effects  Tell him of her if you are allergic to any medicine  Keep a list of the medicines, vitamins, and herbs you take  Include the amounts, and when and why you take them  Bring the list or the pill bottles to follow-up visits  Carry your medicine list with you in case of an emergency  Follow up with your healthcare provider as directed: You may need to return within a week to check your injury again  Write down your questions so you remember to ask them during your visits    Help a contusion heal:   · Rest the injured area  or use it less than usual  If you bruised your leg or foot, you may need crutches or a cane to help you walk  This will help you keep weight off your injured body part  · Apply ice  to decrease swelling and pain  Ice may also help prevent tissue damage  Use an ice pack, or put crushed ice in a plastic bag  Cover it with a towel and place it on your bruise for 15 to 20 minutes every hour or as directed  · Use compression  to support the area and decrease swelling  Wrap an elastic bandage around the area over the bruised muscle  Make sure the bandage is not too tight  You should be able to fit 1 finger between the bandage and your skin  · Elevate (raise) your injured body part  above the level of your heart to help decrease pain and swelling  Use pillows, blankets, or rolled towels to elevate the area as often as you can  · Do not drink alcohol  as directed  Alcohol may slow healing  · Do not stretch injured muscles  right after your injury  Ask your healthcare provider when and how you may safely stretch after your injury  Gentle stretches can help increase your flexibility  · Do not massage the area or put heating pads  on the bruise right after your injury  Heat and massage may slow healing  Your healthcare provider may tell you to apply heat after several days  At that time, heat will start to help the injury heal   Prevent another contusion:   · Stretch and warm up before you play sports or exercise  · Wear protective gear when you play sports  Examples are shin guards and padding  · If you begin a new physical activity, start slowly to give your body a chance to adjust   © 2017 [employer] [employer] Information is for End User's use only and may not be sold, redistributed or otherwise used for commercial purposes  All illustrations and images included in CareNotes® are the copyrighted property of [employer]  or Linus Guerrero  The above information is an  only   It is not intended as medical advice for individual conditions or treatments  Talk to your doctor, nurse or pharmacist before following any medical regimen to see if it is safe and effective for you

## 2018-02-09 PROCEDURE — 93971 EXTREMITY STUDY: CPT | Performed by: SURGERY

## 2018-03-09 DIAGNOSIS — E55.9 VITAMIN D DEFICIENCY: Primary | ICD-10-CM

## 2018-03-09 RX ORDER — ERGOCALCIFEROL 1.25 MG/1
CAPSULE ORAL
Qty: 4 CAPSULE | Refills: 6 | Status: SHIPPED | OUTPATIENT
Start: 2018-03-09 | End: 2019-04-29 | Stop reason: ALTCHOICE

## 2018-03-12 DIAGNOSIS — E78.2 MIXED HYPERLIPIDEMIA: Primary | ICD-10-CM

## 2018-03-12 DIAGNOSIS — I10 ESSENTIAL HYPERTENSION: ICD-10-CM

## 2018-03-12 RX ORDER — LISINOPRIL 10 MG/1
TABLET ORAL
Qty: 30 TABLET | Refills: 6 | Status: SHIPPED | OUTPATIENT
Start: 2018-03-12 | End: 2018-05-12 | Stop reason: SDUPTHER

## 2018-03-12 RX ORDER — ATORVASTATIN CALCIUM 10 MG/1
TABLET, FILM COATED ORAL
Qty: 30 TABLET | Refills: 6 | Status: SHIPPED | OUTPATIENT
Start: 2018-03-12 | End: 2018-05-12 | Stop reason: SDUPTHER

## 2018-05-12 DIAGNOSIS — E78.2 MIXED HYPERLIPIDEMIA: ICD-10-CM

## 2018-05-12 DIAGNOSIS — I10 ESSENTIAL HYPERTENSION: ICD-10-CM

## 2018-05-13 RX ORDER — LISINOPRIL 10 MG/1
TABLET ORAL
Qty: 30 TABLET | Refills: 6 | Status: SHIPPED | OUTPATIENT
Start: 2018-05-13 | End: 2018-07-05 | Stop reason: SDUPTHER

## 2018-05-13 RX ORDER — ATORVASTATIN CALCIUM 10 MG/1
TABLET, FILM COATED ORAL
Qty: 30 TABLET | Refills: 6 | Status: SHIPPED | OUTPATIENT
Start: 2018-05-13 | End: 2018-07-05 | Stop reason: SDUPTHER

## 2018-06-09 ENCOUNTER — APPOINTMENT (EMERGENCY)
Dept: RADIOLOGY | Facility: HOSPITAL | Age: 53
End: 2018-06-09
Payer: COMMERCIAL

## 2018-06-09 ENCOUNTER — HOSPITAL ENCOUNTER (EMERGENCY)
Facility: HOSPITAL | Age: 53
Discharge: HOME | End: 2018-06-09
Attending: EMERGENCY MEDICINE | Admitting: EMERGENCY MEDICINE
Payer: COMMERCIAL

## 2018-06-09 VITALS
RESPIRATION RATE: 18 BRPM | DIASTOLIC BLOOD PRESSURE: 80 MMHG | WEIGHT: 315 LBS | OXYGEN SATURATION: 95 % | SYSTOLIC BLOOD PRESSURE: 121 MMHG | TEMPERATURE: 97.4 F | BODY MASS INDEX: 46.18 KG/M2 | HEART RATE: 96 BPM
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
## 2018-06-09 DIAGNOSIS — M25.562 BILATERAL KNEE PAIN: ICD-10-CM

## 2018-06-09 DIAGNOSIS — M25.561 BILATERAL KNEE PAIN: ICD-10-CM

## 2018-06-09 PROCEDURE — 73564 X-RAY EXAM KNEE 4 OR MORE: CPT

## 2018-06-09 PROCEDURE — 99284 EMERGENCY DEPT VISIT MOD MDM: CPT

## 2018-06-09 PROCEDURE — 90715 TDAP VACCINE 7 YRS/> IM: CPT | Performed by: EMERGENCY MEDICINE

## 2018-06-09 PROCEDURE — 72125 CT NECK SPINE W/O DYE: CPT

## 2018-06-09 PROCEDURE — 90471 IMMUNIZATION ADMIN: CPT

## 2018-06-09 RX ORDER — NAPROXEN 500 MG/1
500 TABLET ORAL 2 TIMES DAILY WITH MEALS
Qty: 30 TABLET | Refills: 0 | Status: SHIPPED | OUTPATIENT
Start: 2018-06-09 | End: 2019-04-19 | Stop reason: ALTCHOICE

## 2018-06-09 RX ORDER — IBUPROFEN 600 MG/1
600 TABLET ORAL ONCE
Status: COMPLETED | OUTPATIENT
Start: 2018-06-09 | End: 2018-06-09

## 2018-06-09 RX ADMIN — TETANUS TOXOID, REDUCED DIPHTHERIA TOXOID AND ACELLULAR PERTUSSIS VACCINE, ADSORBED 0.5 ML: 5; 2.5; 8; 8; 2.5 SUSPENSION INTRAMUSCULAR at 20:11

## 2018-06-09 RX ADMIN — IBUPROFEN 600 MG: 600 TABLET ORAL at 20:14

## 2018-06-09 NOTE — ED ATTENDING ATTESTATION
Francis Gilbert DO, saw and evaluated the patient  I have discussed the patient with the resident/non-physician practitioner and agree with the resident's/non-physician practitioner's findings, Plan of Care, and MDM as documented in the resident's/non-physician practitioner's note, except where noted  All available labs and Radiology studies were reviewed  At this point I agree with the current assessment done in the Emergency Department  I have conducted an independent evaluation of this patient a history and physical is as follows:    49 yo male presents for evaluation s/p [facility] fall x 2 today while gardening  Sarah Cho forward striking his nose, then later fell backward  He states when he fell backward he felt "pops" in his knees, and since that time he has been having bilateral knee pain and has been unable to bear weight due to pains in his knees  He also c/o neck pain  Pain in knees rated 9/10, constant since onset, worse with light palpation, weight bearing  Denies focal weakness/numbness/tingling  Able to fully extend B knees, but has limited ROM with flexion of B knees  Imp: fall, neck pain, B knee pain plan: B knee films, CT cpsine  Analgesia        Critical Care Time  CritCare Time    Procedures

## 2018-06-09 NOTE — ED PROVIDER NOTES
History  Chief Complaint   Patient presents with    Fall     Pt present to ed with c/o fall x 2 today  Pt reports bilateral knee pain  Pt denies loc  HPI    This is a 48 year male that presents today after a fall  Patient states he had 2 mechanical fall today  1st when he was gardening he tripped into a hole and fell forward striking his nose  States he has some abrasions on his nose  Did not lose consciousness  States he got up and started walking again  States when he was dancing later on he fell backwards and felt some pops in his knees  Since then he has been unable to bear any weight  He also has some mild neck pain as well  Denies any headaches  No loss of consciousness  States pain severe constant  Pain with even light palpation  Denies any numbness or tingling  Equal pulses  Patient is able to fully extend bilateral knees but limited range of motion with flexion  No obvious signs of trauma  No effusion appreciated will get x-rays along with CT C-spine and pain control    Prior to Admission Medications   Prescriptions Last Dose Informant Patient Reported? Taking?    MULTIPLE VITAMINS-CALCIUM PO  Self Yes Yes   Sig: Take 1 tablet by mouth daily   albuterol (PROVENTIL HFA) 90 mcg/act inhaler  Self Yes Yes   Sig: Inhale   atorvastatin (LIPITOR) 10 mg tablet   No Yes   Sig: TAKE 1 TABLET BY MOUTH EVERY DAY   budesonide-formoterol (SYMBICORT) 160-4 5 mcg/act inhaler  Self Yes Yes   Sig: Inhale 2 puffs 2 (two) times a day   ergocalciferol (VITAMIN D2) 50,000 units   No Yes   Sig: TAKE 1 CAPSULE BY MOUTH EVERY WEEK   lisinopril (ZESTRIL) 10 mg tablet   No Yes   Sig: TAKE 1 TABLET DAILY   metFORMIN (GLUCOPHAGE) 500 mg tablet  Self Yes Yes   Sig: Take 1 tablet by mouth 2 (two) times a day After meals   metoprolol succinate (TOPROL-XL) 50 mg 24 hr tablet  Self Yes Yes   Sig: Take 50 mg by mouth daily      Facility-Administered Medications: None       Past Medical History:   Diagnosis Date    Asthma  Diabetes mellitus ([facility] [address] )     Gastric bypass status for obesity     Hyperlipidemia     Hypertension     Obesity        Past Surgical History:   Procedure Laterality Date    CARPAL TUNNEL RELEASE      bilateral    GASTRIC BYPASS      HERNIA REPAIR      TONSILLECTOMY         History reviewed  No pertinent family history  I have reviewed and agree with the history as documented  Social History   Substance Use Topics    Smoking status: Light Tobacco Smoker     Types: Cigars    Smokeless tobacco: Never Used      Comment: occassional    Alcohol use Yes      Comment: social        Review of Systems   Constitutional: Negative  Negative for diaphoresis and fever  HENT: Negative  Respiratory: Negative  Negative for cough, shortness of breath and wheezing  Cardiovascular: Negative  Negative for chest pain, palpitations and leg swelling  Gastrointestinal: Negative for abdominal distention, abdominal pain, nausea and vomiting  Genitourinary: Negative  Musculoskeletal: Positive for neck pain  Bilateral knee pain   Skin: Negative  Neurological: Negative  Psychiatric/Behavioral: Negative  All other systems reviewed and are negative  Physical Exam  ED Triage Vitals [06/09/18 1855]   Temperature Pulse Respirations Blood Pressure SpO2   (!) 97 4 °F (36 3 °C) 96 18 121/80 95 %      Temp Source Heart Rate Source Patient Position - Orthostatic VS BP Location FiO2 (%)   Tympanic Monitor Sitting Right arm --      Pain Score       9           Orthostatic Vital Signs  Vitals:    06/09/18 1855   BP: 121/80   Pulse: 96   Patient Position - Orthostatic VS: Sitting       Physical Exam   Constitutional: He is oriented to person, place, and time  He appears well-developed and well-nourished  No distress  HENT:   Head: Normocephalic and atraumatic     Nose: Nose normal    Mouth/Throat: Oropharynx is clear and moist    Eyes: Conjunctivae and EOM are normal  Pupils are equal, round, and reactive to light  Neck: Normal range of motion  Neck supple  Mild midline neck tenderness   Cardiovascular: Normal rate, regular rhythm and normal heart sounds  No murmur heard  Pulmonary/Chest: Effort normal and breath sounds normal  No respiratory distress  He has no wheezes  He has no rales  Abdominal: Soft  Bowel sounds are normal  He exhibits no distension  There is no tenderness  There is no rebound and no guarding  Musculoskeletal: Normal range of motion  He exhibits tenderness  He exhibits no edema or deformity  Bilateral anterior knees  Able to fully extend  Limitation in flexion  Equal DP pulses  Normal strength  Normal sensation  No obvious signs of trauma  No effusion appreciated  Neurological: He is alert and oriented to person, place, and time  No cranial nerve deficit  Skin: Skin is warm and dry  No rash noted  He is not diaphoretic  No pallor  Psychiatric: He has a normal mood and affect  Vitals reviewed  ED Medications  Medications   ibuprofen (MOTRIN) tablet 600 mg (600 mg Oral Given 6/9/18 2014)   tetanus-diphtheria-acellular pertussis (BOOSTRIX) IM injection 0 5 mL (0 5 mL Intramuscular Given 6/9/18 2011)       Diagnostic Studies  Results Reviewed     None                 CT cervical spine without contrast   Final Result by Janice Castaneda MD (06/09 2025)      No cervical spine fracture or traumatic malalignment                     Workstation performed: [id number]         XR knee 4+ views Right injury    (Results Pending)   XR knee 4+ views left injury    (Results Pending)         Procedures  Procedures      Phone Consults  ED Phone Contact    ED Course                               MDM  CritCare Time    Disposition  Final diagnoses:   Fall   Bilateral knee pain     Time reflects when diagnosis was documented in both MDM as applicable and the Disposition within this note     Time User Action Codes Description Comment    6/9/2018  8:31 PM Bal Mckeon  8  [W19 XXXA] Fall     6/9/2018  8:32 PM Brianna Pulling Add Buddy Cabral,  M25 [id number]] Bilateral knee pain       ED Disposition     ED Disposition Condition Comment    Discharge  Karly Bright discharge to home/self care  Condition at discharge: Good        Follow-up Information     Follow up With Specialties Details Why Contact Info    Amberly Motta MD Orthopedic Surgery Schedule an appointment as soon as possible for a visit As needed [address] [place] [address]  [phone]            Discharge Medication List as of 6/9/2018  8:32 PM      START taking these medications    Details   naproxen (NAPROSYN) 500 mg tablet Take 1 tablet (500 mg total) by mouth 2 (two) times a day with meals, Starting Sat 6/9/2018, Print         CONTINUE these medications which have NOT CHANGED    Details   albuterol (PROVENTIL HFA) 90 mcg/act inhaler Inhale, Starting Mon 5/23/2016, Historical Med      atorvastatin (LIPITOR) 10 mg tablet TAKE 1 TABLET BY MOUTH EVERY DAY, Normal      budesonide-formoterol (SYMBICORT) 160-4 5 mcg/act inhaler Inhale 2 puffs 2 (two) times a day, Starting Wed 11/9/2016, Historical Med      ergocalciferol (VITAMIN D2) 50,000 units TAKE 1 CAPSULE BY MOUTH EVERY WEEK, Normal      lisinopril (ZESTRIL) 10 mg tablet TAKE 1 TABLET DAILY, Normal      metFORMIN (GLUCOPHAGE) 500 mg tablet Take 1 tablet by mouth 2 (two) times a day After meals, Starting Thu 5/11/2017, Historical Med      metoprolol succinate (TOPROL-XL) 50 mg 24 hr tablet Take 50 mg by mouth daily, Starting Fri 6/2/2017, Historical Med      MULTIPLE VITAMINS-CALCIUM PO Take 1 tablet by mouth daily, Starting Tue 2/23/2016, Historical Med           No discharge procedures on file  ED Provider  Attending physically available and evaluated Karly Bright  JUANIS managed the patient along with the ED Attending      Electronically Signed by         Shweta Bolton MD  06/09/18 9490

## 2018-06-10 NOTE — ED NOTES
Pt able to walk with crutches  Reports feeling well enough to go home       Subha Carey RN  06/09/18 2039

## 2018-06-10 NOTE — DISCHARGE INSTRUCTIONS
Knee Pain   WHAT YOU NEED TO KNOW:   Knee pain may start suddenly, or it may be a long-term problem  You may have pain on the side, front, or back of your knee  You may have knee stiffness and swelling  You may hear popping sounds or feel like your knee is giving way or locking up as you walk  You may feel pain when you sit, stand, walk, or climb up and down stairs  Knee pain can be caused by conditions such as obesity, inflammation, or strains or tears in ligaments or tendons  DISCHARGE INSTRUCTIONS:   Follow up with your healthcare provider within 24 hours or as directed: You may need follow-up treatments, such as steroid injections to decrease pain  Write down your questions so you remember to ask them during your visits  Self-care:   · Rest  your knee so it can heal  Limit activities that increase your pain  · Ice  can help reduce swelling  Wrap ice in a towel and put it on your knee for as long and as often as directed  · Compression  with a brace or bandage can help reduce swelling  Use a brace or bandage only as directed  · Elevation  helps decrease pain and swelling  Elevate your knee while you are sitting or lying down  Prop your leg on pillows to keep your knee above the level of your heart  Medicines:   · NSAIDs  help decrease swelling and pain or fever  This medicine is available with or without a doctor's order  NSAIDs can cause stomach bleeding or kidney problems in certain people  If you take blood thinner medicine, always ask your healthcare provider if NSAIDs are safe for you  Always read the medicine label and follow directions  · Acetaminophen  decreases pain and fever  It is available without a doctor's order  Ask how much to take and when to take it  Follow directions  Acetaminophen can cause liver damage if not taken correctly  · Take your medicine as directed  Contact your healthcare provider if you think your medicine is not helping or if you have side effects   Tell him or her if you are allergic to any medicine  Keep a list of the medicines, vitamins, and herbs you take  Include the amounts, and when and why you take them  Bring the list or the pill bottles to follow-up visits  Carry your medicine list with you in case of an emergency  Exercise as directed: You may need to see a physical therapist or do recommended exercises to improve movement and decrease your pain  You may be directed to walk, swim, or ride a bike  Follow your exercise plan exactly as directed to avoid further injury  Contact your healthcare provider if:   · You have questions or concerns about your condition or care  Return to the emergency department if:   · Your pain is worse, even after treatment  · You cannot bend or straighten your leg completely  · The swelling around your knee does not go down even with treatment  · Your knee is painful and hot to the touch  © 2017 [facility] Information is for End User's use only and may not be sold, redistributed or otherwise used for commercial purposes  All illustrations and images included in CareNotes® are the copyrighted property of [employer]  or Linus Guerrero  The above information is an  only  It is not intended as medical advice for individual conditions or treatments  Talk to your doctor, nurse or pharmacist before following any medical regimen to see if it is safe and effective for you

## 2018-06-14 ENCOUNTER — OFFICE VISIT (OUTPATIENT)
Dept: OBGYN CLINIC | Facility: CLINIC | Age: 53
End: 2018-06-14
Payer: COMMERCIAL

## 2018-06-14 VITALS
WEIGHT: 315 LBS | HEIGHT: 73 IN | HEART RATE: 97 BPM | BODY MASS INDEX: 41.75 KG/M2 | SYSTOLIC BLOOD PRESSURE: 156 MMHG | DIASTOLIC BLOOD PRESSURE: 100 MMHG

## 2018-06-14 DIAGNOSIS — M17.0 PRIMARY OSTEOARTHRITIS OF BOTH KNEES: Primary | ICD-10-CM

## 2018-06-14 DIAGNOSIS — S83.91XA KNEE SPRAIN, BILATERAL: ICD-10-CM

## 2018-06-14 DIAGNOSIS — S83.92XA KNEE SPRAIN, BILATERAL: ICD-10-CM

## 2018-06-14 PROCEDURE — 99213 OFFICE O/P EST LOW 20 MIN: CPT | Performed by: ORTHOPAEDIC SURGERY

## 2018-06-14 NOTE — ASSESSMENT & PLAN NOTE
51-year-old male with bilateral knee sprains, primarily involving hamstring tendons  He has some aggravation of some underlying osteoarthritis as well  I recommended icing, physical therapy, progression of activity as tolerated  He has no restrictions  I will see him back as needed

## 2018-06-14 NOTE — ASSESSMENT & PLAN NOTE
74-year-old male with bilateral knee osteoarthritis, aggravated by some weakness in the stepping  He will continue icing as much as possible  Will have him avoid driving and working for the next week, then he will be ready to return to work  He is unable to take anti-inflammatories but was encouraged on Tylenol when needed  I will see him back as needed

## 2018-06-14 NOTE — PATIENT INSTRUCTIONS
Ice your knees 20-30 mins every hour whenever there is swelling  Also ice in the same way for several hours after activity that causes pain or swelling

## 2018-06-14 NOTE — PROGRESS NOTES
Assessment:     1  Primary osteoarthritis of both knees    2  Knee sprain, bilateral          Plan:     Problem List Items Addressed This Visit        Musculoskeletal and Integument    Knee sprain, bilateral     73-year-old male with bilateral knee sprains, primarily involving hamstring tendons  He has some aggravation of some underlying osteoarthritis as well  I recommended icing, physical therapy, progression of activity as tolerated  He has no restrictions  I will see him back as needed  Relevant Orders    Ambulatory referral to Physical Therapy    Primary osteoarthritis of both knees - Primary     73-year-old male with bilateral knee osteoarthritis, aggravated by some weakness in the stepping  He will continue icing as much as possible  Will have him avoid driving and working for the next week, then he will be ready to return to work  He is unable to take anti-inflammatories but was encouraged on Tylenol when needed  I will see him back as needed  Relevant Orders    Ambulatory referral to Physical Therapy           Patient ID: Tory Rocha is a 48 y o  male  Chief Complaint:  Bilateral knee pain    Subjective:  73-year-old male with bilateral knee pain  He was at a party on June 9, 2018 and had two separate falls  His 1st fall he fell forward injuring his face  The 2nd fall he was dancing with his wife on some uneven ground and his knees go down  He fell in her pops in both of his knees that time  Complains of pain primarily in the medial aspect of his knee  The knees hurt him all the time especially with bearing weight  The left bothers him more than the right  He is using two canes to get around  He has been icing regularly  He notes some swelling, but feels that the icing as keeping it under control  He is unable to take anti-inflammatories due to bypass surgery  He is here today to follow-up after his ER visit            Allergy:  Allergies   Allergen Reactions    Bactrim [Sulfamethoxazole-Trimethoprim] Other (See Comments)     shakiness       Medications:  all current active meds have been reviewed    ROS:  Review of Systems   Constitutional: Negative  HENT: Negative  Eyes: Negative  Respiratory: Negative  Cardiovascular: Negative  Gastrointestinal: Negative  Endocrine: Negative  Genitourinary: Negative  Musculoskeletal: Positive for arthralgias and joint swelling  Skin: Negative  Allergic/Immunologic: Negative  Neurological: Negative  Hematological: Negative  Psychiatric/Behavioral: Negative  Objective:  BP Readings from Last 1 Encounters:   06/14/18 156/100      Wt Readings from Last 1 Encounters:   06/14/18 (!) 159 kg (350 lb 8 5 oz)        Exam:   Physical Exam  Right Knee Exam     Comments:  ROM: , crepitus on knee range of motion  Tenderness:  Patellofemoral joint, medial joint line, hamstring tendons from insertion up to the midthigh, ecchymosis of the medial thigh  Swelling:  None  Effusion:  None  McMurrys:  Negative  Varus stressing:  Negative  Valgus stressing:  Negative  Lachman:  Negative  Posterior Drawer:  Negative  Palpable DP pulse, up/down toes/ankle intact, SITLT SP/DP/sural/saph n  Left Knee Exam     Comments:  ROM: , crepitus on knee range of motion  Tenderness:  Patellofemoral joint, medial joint line, hamstring tendons from insertion up to the midthigh  Swelling:  None  Effusion:  None  McMurrys:  Negative  Varus stressing:  Negative  Valgus stressing:  Negative  Lachman:  Negative  Posterior Drawer:  Negative  Palpable DP pulse, up/down toes/ankle intact, SITLT SP/DP/sural/saph n                 Radiographs:  I have personally reviewed pertinent films in PACS and my interpretation is Moderate osteoarthritis of the bilateral knees primarily the medial and patellofemoral joints  The left is worse than the right  Chamois G[place]

## 2018-06-14 NOTE — LETTER
June 14, 2018     Patient: Parminder Garcia   YOB: 1965   Date of Visit: 6/14/2018       To Whom it May Concern:    Natasha Sparks is under my professional care  He was seen in my office on 6/14/2018  He may return to work on 6/21/18  If you have any questions or concerns, please don't hesitate to call           Sincerely,          Sapna Licona MD        CC: No Recipients

## 2018-07-05 DIAGNOSIS — E78.2 MIXED HYPERLIPIDEMIA: ICD-10-CM

## 2018-07-05 DIAGNOSIS — I10 ESSENTIAL HYPERTENSION: ICD-10-CM

## 2018-07-05 RX ORDER — ATORVASTATIN CALCIUM 10 MG/1
TABLET, FILM COATED ORAL
Qty: 30 TABLET | Refills: 6 | Status: SHIPPED | OUTPATIENT
Start: 2018-07-05 | End: 2018-09-26 | Stop reason: SDUPTHER

## 2018-07-05 RX ORDER — LISINOPRIL 10 MG/1
TABLET ORAL
Qty: 30 TABLET | Refills: 6 | Status: SHIPPED | OUTPATIENT
Start: 2018-07-05 | End: 2018-09-26 | Stop reason: SDUPTHER

## 2018-09-26 DIAGNOSIS — E78.2 MIXED HYPERLIPIDEMIA: ICD-10-CM

## 2018-09-26 DIAGNOSIS — I10 ESSENTIAL HYPERTENSION: ICD-10-CM

## 2018-09-26 PROCEDURE — 4010F ACE/ARB THERAPY RXD/TAKEN: CPT | Performed by: FAMILY MEDICINE

## 2018-09-26 RX ORDER — ATORVASTATIN CALCIUM 10 MG/1
TABLET, FILM COATED ORAL
Qty: 30 TABLET | Refills: 6 | Status: SHIPPED | OUTPATIENT
Start: 2018-09-26 | End: 2020-01-23

## 2018-09-26 RX ORDER — LISINOPRIL 10 MG/1
TABLET ORAL
Qty: 30 TABLET | Refills: 6 | Status: SHIPPED | OUTPATIENT
Start: 2018-09-26 | End: 2019-04-19 | Stop reason: SDUPTHER

## 2019-03-20 LAB
LEFT EYE DIABETIC RETINOPATHY: NORMAL
RIGHT EYE DIABETIC RETINOPATHY: NORMAL

## 2019-04-18 PROBLEM — E55.9 VITAMIN D DEFICIENCY: Status: ACTIVE | Noted: 2019-04-18

## 2019-04-18 PROBLEM — J45.20 MILD INTERMITTENT ASTHMA WITHOUT COMPLICATION: Status: ACTIVE | Noted: 2019-04-18

## 2019-04-18 PROBLEM — E78.2 MIXED HYPERLIPIDEMIA: Status: ACTIVE | Noted: 2019-04-18

## 2019-04-18 PROBLEM — E11.65 TYPE 2 DIABETES MELLITUS WITH HYPERGLYCEMIA (HCC): Status: ACTIVE | Noted: 2018-01-30

## 2019-04-18 PROBLEM — E53.8 VITAMIN B12 DEFICIENCY: Status: ACTIVE | Noted: 2019-04-18

## 2019-04-18 PROBLEM — E66.01 MORBID OBESITY (HCC): Status: ACTIVE | Noted: 2019-04-18

## 2019-04-18 PROBLEM — K52.9 ACUTE GASTROENTERITIS: Status: RESOLVED | Noted: 2018-01-30 | Resolved: 2019-04-18

## 2019-04-19 ENCOUNTER — OFFICE VISIT (OUTPATIENT)
Dept: FAMILY MEDICINE CLINIC | Facility: CLINIC | Age: 54
End: 2019-04-19
Payer: COMMERCIAL

## 2019-04-19 VITALS
WEIGHT: 315 LBS | DIASTOLIC BLOOD PRESSURE: 84 MMHG | SYSTOLIC BLOOD PRESSURE: 138 MMHG | HEART RATE: 90 BPM | RESPIRATION RATE: 18 BRPM | OXYGEN SATURATION: 99 % | TEMPERATURE: 97.3 F | BODY MASS INDEX: 47.22 KG/M2

## 2019-04-19 DIAGNOSIS — I10 ESSENTIAL HYPERTENSION: Primary | ICD-10-CM

## 2019-04-19 DIAGNOSIS — J45.20 MILD INTERMITTENT ASTHMA WITHOUT COMPLICATION: ICD-10-CM

## 2019-04-19 DIAGNOSIS — E66.01 MORBID OBESITY (HCC): ICD-10-CM

## 2019-04-19 DIAGNOSIS — E55.9 VITAMIN D DEFICIENCY: ICD-10-CM

## 2019-04-19 DIAGNOSIS — E53.8 VITAMIN B12 DEFICIENCY: ICD-10-CM

## 2019-04-19 DIAGNOSIS — E78.2 MIXED HYPERLIPIDEMIA: ICD-10-CM

## 2019-04-19 DIAGNOSIS — Z11.59 ENCOUNTER FOR HEPATITIS C VIRUS SCREENING TEST FOR HIGH RISK PATIENT: ICD-10-CM

## 2019-04-19 DIAGNOSIS — Z91.89 ENCOUNTER FOR HEPATITIS C VIRUS SCREENING TEST FOR HIGH RISK PATIENT: ICD-10-CM

## 2019-04-19 DIAGNOSIS — E11.65 TYPE 2 DIABETES MELLITUS WITH HYPERGLYCEMIA, WITHOUT LONG-TERM CURRENT USE OF INSULIN (HCC): ICD-10-CM

## 2019-04-19 DIAGNOSIS — Z12.11 SCREENING FOR COLON CANCER: ICD-10-CM

## 2019-04-19 PROCEDURE — 99214 OFFICE O/P EST MOD 30 MIN: CPT | Performed by: FAMILY MEDICINE

## 2019-04-19 RX ORDER — ALBUTEROL SULFATE 90 UG/1
2 AEROSOL, METERED RESPIRATORY (INHALATION) EVERY 4 HOURS PRN
Qty: 11 INHALER | Refills: 5 | Status: SHIPPED | OUTPATIENT
Start: 2019-04-19 | End: 2020-05-18

## 2019-04-19 RX ORDER — LISINOPRIL 10 MG/1
10 TABLET ORAL DAILY
Qty: 30 TABLET | Refills: 6 | Status: SHIPPED | OUTPATIENT
Start: 2019-04-19 | End: 2019-04-29 | Stop reason: SDUPTHER

## 2019-04-23 ENCOUNTER — APPOINTMENT (OUTPATIENT)
Dept: LAB | Facility: HOSPITAL | Age: 54
End: 2019-04-23
Payer: COMMERCIAL

## 2019-04-23 DIAGNOSIS — E55.9 VITAMIN D DEFICIENCY: ICD-10-CM

## 2019-04-23 DIAGNOSIS — E11.65 TYPE 2 DIABETES MELLITUS WITH HYPERGLYCEMIA, WITHOUT LONG-TERM CURRENT USE OF INSULIN (HCC): ICD-10-CM

## 2019-04-23 DIAGNOSIS — E53.8 VITAMIN B12 DEFICIENCY: ICD-10-CM

## 2019-04-23 DIAGNOSIS — Z11.59 ENCOUNTER FOR HEPATITIS C VIRUS SCREENING TEST FOR HIGH RISK PATIENT: ICD-10-CM

## 2019-04-23 DIAGNOSIS — Z91.89 ENCOUNTER FOR HEPATITIS C VIRUS SCREENING TEST FOR HIGH RISK PATIENT: ICD-10-CM

## 2019-04-23 DIAGNOSIS — I10 ESSENTIAL HYPERTENSION: ICD-10-CM

## 2019-04-23 DIAGNOSIS — E78.2 MIXED HYPERLIPIDEMIA: ICD-10-CM

## 2019-04-23 LAB
25(OH)D3 SERPL-MCNC: 27.8 NG/ML (ref 30–100)
ALBUMIN SERPL BCP-MCNC: 3.3 G/DL (ref 3.5–5)
ALP SERPL-CCNC: 87 U/L (ref 46–116)
ALT SERPL W P-5'-P-CCNC: 25 U/L (ref 12–78)
ANION GAP SERPL CALCULATED.3IONS-SCNC: 4 MMOL/L (ref 4–13)
AST SERPL W P-5'-P-CCNC: 18 U/L (ref 5–45)
BASOPHILS # BLD AUTO: 0.05 THOUSANDS/ΜL (ref 0–0.1)
BASOPHILS NFR BLD AUTO: 1 % (ref 0–1)
BILIRUB SERPL-MCNC: 0.96 MG/DL (ref 0.2–1)
BUN SERPL-MCNC: 9 MG/DL (ref 5–25)
CALCIUM SERPL-MCNC: 8.4 MG/DL (ref 8.3–10.1)
CHLORIDE SERPL-SCNC: 100 MMOL/L (ref 100–108)
CHOLEST SERPL-MCNC: 196 MG/DL (ref 50–200)
CO2 SERPL-SCNC: 28 MMOL/L (ref 21–32)
CREAT SERPL-MCNC: 0.91 MG/DL (ref 0.6–1.3)
CREAT UR-MCNC: 127 MG/DL
EOSINOPHIL # BLD AUTO: 0.23 THOUSAND/ΜL (ref 0–0.61)
EOSINOPHIL NFR BLD AUTO: 3 % (ref 0–6)
ERYTHROCYTE [DISTWIDTH] IN BLOOD BY AUTOMATED COUNT: 12.2 % (ref 11.6–15.1)
EST. AVERAGE GLUCOSE BLD GHB EST-MCNC: 174 MG/DL
GFR SERPL CREATININE-BSD FRML MDRD: 97 ML/MIN/1.73SQ M
GLUCOSE P FAST SERPL-MCNC: 257 MG/DL (ref 65–99)
HBA1C MFR BLD: 7.7 % (ref 4.2–6.3)
HCT VFR BLD AUTO: 46 % (ref 36.5–49.3)
HCV AB SER QL: NORMAL
HDLC SERPL-MCNC: 40 MG/DL (ref 40–60)
HGB BLD-MCNC: 15.3 G/DL (ref 12–17)
IMM GRANULOCYTES # BLD AUTO: 0.04 THOUSAND/UL (ref 0–0.2)
IMM GRANULOCYTES NFR BLD AUTO: 1 % (ref 0–2)
LDLC SERPL CALC-MCNC: 131 MG/DL (ref 0–100)
LYMPHOCYTES # BLD AUTO: 1.02 THOUSANDS/ΜL (ref 0.6–4.47)
LYMPHOCYTES NFR BLD AUTO: 13 % (ref 14–44)
MCH RBC QN AUTO: 31.6 PG (ref 26.8–34.3)
MCHC RBC AUTO-ENTMCNC: 33.3 G/DL (ref 31.4–37.4)
MCV RBC AUTO: 95 FL (ref 82–98)
MICROALBUMIN UR-MCNC: 40.9 MG/L (ref 0–20)
MICROALBUMIN/CREAT 24H UR: 32 MG/G CREATININE (ref 0–30)
MONOCYTES # BLD AUTO: 0.55 THOUSAND/ΜL (ref 0.17–1.22)
MONOCYTES NFR BLD AUTO: 7 % (ref 4–12)
NEUTROPHILS # BLD AUTO: 6.19 THOUSANDS/ΜL (ref 1.85–7.62)
NEUTS SEG NFR BLD AUTO: 75 % (ref 43–75)
NONHDLC SERPL-MCNC: 156 MG/DL
NRBC BLD AUTO-RTO: 0 /100 WBCS
PLATELET # BLD AUTO: 261 THOUSANDS/UL (ref 149–390)
PMV BLD AUTO: 11.5 FL (ref 8.9–12.7)
POTASSIUM SERPL-SCNC: 4 MMOL/L (ref 3.5–5.3)
PROT SERPL-MCNC: 7 G/DL (ref 6.4–8.2)
RBC # BLD AUTO: 4.84 MILLION/UL (ref 3.88–5.62)
SODIUM SERPL-SCNC: 132 MMOL/L (ref 136–145)
TRIGL SERPL-MCNC: 126 MG/DL
TSH SERPL DL<=0.05 MIU/L-ACNC: 2.65 UIU/ML (ref 0.36–3.74)
VIT B12 SERPL-MCNC: 183 PG/ML (ref 100–900)
WBC # BLD AUTO: 8.08 THOUSAND/UL (ref 4.31–10.16)

## 2019-04-23 PROCEDURE — 80061 LIPID PANEL: CPT

## 2019-04-23 PROCEDURE — 82570 ASSAY OF URINE CREATININE: CPT | Performed by: FAMILY MEDICINE

## 2019-04-23 PROCEDURE — 80053 COMPREHEN METABOLIC PANEL: CPT

## 2019-04-23 PROCEDURE — 86803 HEPATITIS C AB TEST: CPT

## 2019-04-23 PROCEDURE — 82306 VITAMIN D 25 HYDROXY: CPT

## 2019-04-23 PROCEDURE — 82043 UR ALBUMIN QUANTITATIVE: CPT | Performed by: FAMILY MEDICINE

## 2019-04-23 PROCEDURE — 85025 COMPLETE CBC W/AUTO DIFF WBC: CPT

## 2019-04-23 PROCEDURE — 84443 ASSAY THYROID STIM HORMONE: CPT

## 2019-04-23 PROCEDURE — 83036 HEMOGLOBIN GLYCOSYLATED A1C: CPT

## 2019-04-23 PROCEDURE — 36415 COLL VENOUS BLD VENIPUNCTURE: CPT

## 2019-04-23 PROCEDURE — 82607 VITAMIN B-12: CPT

## 2019-04-29 ENCOUNTER — OFFICE VISIT (OUTPATIENT)
Dept: FAMILY MEDICINE CLINIC | Facility: CLINIC | Age: 54
End: 2019-04-29
Payer: COMMERCIAL

## 2019-04-29 VITALS
BODY MASS INDEX: 47.53 KG/M2 | RESPIRATION RATE: 16 BRPM | TEMPERATURE: 98.2 F | OXYGEN SATURATION: 98 % | HEART RATE: 88 BPM | SYSTOLIC BLOOD PRESSURE: 144 MMHG | WEIGHT: 315 LBS | DIASTOLIC BLOOD PRESSURE: 94 MMHG

## 2019-04-29 DIAGNOSIS — E66.01 MORBID OBESITY (HCC): ICD-10-CM

## 2019-04-29 DIAGNOSIS — E11.65 TYPE 2 DIABETES MELLITUS WITH HYPERGLYCEMIA, WITHOUT LONG-TERM CURRENT USE OF INSULIN (HCC): ICD-10-CM

## 2019-04-29 DIAGNOSIS — E11.65 TYPE 2 DIABETES MELLITUS WITH HYPERGLYCEMIA, WITHOUT LONG-TERM CURRENT USE OF INSULIN (HCC): Primary | ICD-10-CM

## 2019-04-29 DIAGNOSIS — E53.8 VITAMIN B12 DEFICIENCY: ICD-10-CM

## 2019-04-29 DIAGNOSIS — E78.2 MIXED HYPERLIPIDEMIA: ICD-10-CM

## 2019-04-29 DIAGNOSIS — E55.9 VITAMIN D DEFICIENCY: ICD-10-CM

## 2019-04-29 DIAGNOSIS — I10 ESSENTIAL HYPERTENSION: Primary | ICD-10-CM

## 2019-04-29 PROCEDURE — 99214 OFFICE O/P EST MOD 30 MIN: CPT | Performed by: FAMILY MEDICINE

## 2019-04-29 RX ORDER — CHOLECALCIFEROL (VITAMIN D3) 125 MCG
TABLET ORAL
Qty: 1 TABLET | Refills: 5
Start: 2019-04-29 | End: 2020-01-23

## 2019-04-29 RX ORDER — LISINOPRIL 10 MG/1
10 TABLET ORAL DAILY
Qty: 30 TABLET | Refills: 6 | Status: SHIPPED | OUTPATIENT
Start: 2019-04-29 | End: 2020-01-16

## 2019-04-29 RX ORDER — LANCETS 33 GAUGE
EACH MISCELLANEOUS
Qty: 200 EACH | Refills: 3 | Status: SHIPPED | OUTPATIENT
Start: 2019-04-29 | End: 2021-08-04 | Stop reason: SDUPTHER

## 2019-04-29 RX ORDER — BLOOD-GLUCOSE METER
EACH MISCELLANEOUS
Qty: 1 EACH | Refills: 0 | Status: SHIPPED | OUTPATIENT
Start: 2019-04-29 | End: 2021-08-04 | Stop reason: SDUPTHER

## 2019-04-29 RX ORDER — LANOLIN ALCOHOL/MO/W.PET/CERES
1000 CREAM (GRAM) TOPICAL DAILY
Qty: 30 TABLET | Refills: 5
Start: 2019-04-29 | End: 2020-01-23

## 2019-04-29 RX ORDER — BLOOD-GLUCOSE METER
EACH MISCELLANEOUS
Qty: 1 KIT | Refills: 0 | Status: SHIPPED | OUTPATIENT
Start: 2019-04-29 | End: 2019-04-29 | Stop reason: CLARIF

## 2019-05-31 ENCOUNTER — TELEPHONE (OUTPATIENT)
Dept: FAMILY MEDICINE CLINIC | Facility: CLINIC | Age: 54
End: 2019-05-31

## 2019-07-17 ENCOUNTER — TELEPHONE (OUTPATIENT)
Dept: FAMILY MEDICINE CLINIC | Facility: CLINIC | Age: 54
End: 2019-07-17

## 2019-07-17 ENCOUNTER — OFFICE VISIT (OUTPATIENT)
Dept: FAMILY MEDICINE CLINIC | Facility: CLINIC | Age: 54
End: 2019-07-17
Payer: COMMERCIAL

## 2019-07-17 VITALS
HEART RATE: 90 BPM | WEIGHT: 315 LBS | DIASTOLIC BLOOD PRESSURE: 86 MMHG | BODY MASS INDEX: 41.75 KG/M2 | TEMPERATURE: 97.5 F | HEIGHT: 73 IN | OXYGEN SATURATION: 97 % | SYSTOLIC BLOOD PRESSURE: 140 MMHG | RESPIRATION RATE: 20 BRPM

## 2019-07-17 DIAGNOSIS — E11.65 TYPE 2 DIABETES MELLITUS WITH HYPERGLYCEMIA, WITHOUT LONG-TERM CURRENT USE OF INSULIN (HCC): ICD-10-CM

## 2019-07-17 DIAGNOSIS — E66.01 MORBID OBESITY (HCC): ICD-10-CM

## 2019-07-17 DIAGNOSIS — I10 ESSENTIAL HYPERTENSION: ICD-10-CM

## 2019-07-17 DIAGNOSIS — R40.0 DAYTIME SLEEPINESS: Primary | ICD-10-CM

## 2019-07-17 PROCEDURE — 99214 OFFICE O/P EST MOD 30 MIN: CPT | Performed by: FAMILY MEDICINE

## 2019-07-17 NOTE — PROGRESS NOTES
Chief Complaint   Patient presents with    Consult     Needs Sleep Study     Health Maintenance   Topic Date Due    CRC Screening: Colonoscopy  1965    Pneumococcal Vaccine: Pediatrics (0 to 5 Years) and At-Risk Patients (6 to 59 Years) (1 of 1 - PPSV23) 05/19/1973    DM Eye Exam  05/19/1977    HEPATITIS B VACCINES (1 of 3 - Risk 3-dose series) 05/19/1986    INFLUENZA VACCINE  09/19/2019 (Originally 7/1/2019)    HEMOGLOBIN A1C  10/23/2019    BMI: Followup Plan  04/19/2020    Diabetic Foot Exam  04/19/2020    URINE MICROALBUMIN  04/23/2020    Depression Screening PHQ  07/17/2020    BMI: Adult  07/17/2020    DTaP,Tdap,and Td Vaccines (2 - Td) 06/09/2028    Pneumococcal Vaccine: 65+ Years (1 of 2 - PCV13) 05/19/2032     Assessment/Plan:    Patient is 42-year-old male with morbid obesity, BMI 48, HTN, Type 2 DM     Will order sleep study to rule out obstructive sleep apnea  Discussed lifestyle and dietary modifications  Encouraged regular exercise, weight reduction  Consider referral to weight management  HTN - stable  Continue Lisinopril  Type 2 DM -  continue Metformin 500 mg 1 tablet twice daily  Follow a low carb diet  Diagnoses and all orders for this visit:    Daytime sleepiness  -     Home Study; Future    Morbid obesity ([address] )  -     Home Study; Future    Essential hypertension  -     Home Study; Future    Type 2 diabetes mellitus with hyperglycemia, without long-term current use of insulin (HCC)          Subjective:      Patient ID: Shara Monterroso is a 48 y o  male  HPI     Patient presents to the office to discuss scheduling sleep study  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 and was advised to schedule sleep study based on his BMI of 48, comorbidities including HTN, Type 2 DM  Patient denies headache, dizziness, chest pain, shortness of breath, heart palpitations  Denies snoring      C/o some daytime sleepiness, mild fatigue  Reviewed current medications with patient  HTN - patient takes Lisinopril 10 mg daily  Denies side effects  Type 2 DM  - blood sugar at home ranges  130 -140's  Patient takes Metformin 500 mg 1 tablet twice daily  He could not tolerate higher dose due to GI side effects  Denies diarrhea now  Morbid obesity - patient tries to follow a low carb, low-fat diet  He walks a few times per week, tries to lose weight  Denies tobacco use  The following portions of the patient's history were reviewed and updated as appropriate: allergies, past family history, past medical history, past social history, past surgical history and problem list     Review of Systems   Constitutional: Positive for fatigue (mild)  Negative for activity change, appetite change, chills and fever  HENT: Negative for congestion, hearing loss, mouth sores, nosebleeds, sore throat, tinnitus and trouble swallowing  Eyes: Negative for pain, discharge, redness, itching and visual disturbance  Respiratory: Negative for cough, chest tightness, shortness of breath and wheezing  Cardiovascular: Negative for chest pain, palpitations and leg swelling  Gastrointestinal: Negative for abdominal pain, blood in stool, constipation, diarrhea, nausea and vomiting  Genitourinary: Negative for difficulty urinating, dysuria, flank pain, frequency and hematuria  Musculoskeletal: Negative for arthralgias, back pain, gait problem, joint swelling and myalgias  Skin: Negative for rash and wound  Neurological: Negative for dizziness, syncope and headaches  Hematological: Negative  Psychiatric/Behavioral: Negative            Objective:      /86 (BP Location: Left arm, Patient Position: Sitting, Cuff Size: Large)   Pulse 90   Temp 97 5 °F (36 4 °C) (Tympanic)   Resp 20   Ht 6' 1" (1 854 m)   Wt (!) 166 kg (365 lb)   SpO2 97%   BMI 48 16 kg/m²          Physical Exam   Constitutional: He appears well-developed and well-nourished  Morbidly obese   HENT:   Head: Normocephalic and atraumatic  Right Ear: External ear normal    Left Ear: External ear normal    Mouth/Throat: Oropharynx is clear and moist    Eyes: Pupils are equal, round, and reactive to light  Conjunctivae are normal    Neck: Normal range of motion  Neck supple  No JVD present  Cardiovascular: Normal rate, regular rhythm and normal heart sounds  No murmur heard  No BL LE edema   Pulmonary/Chest: Effort normal and breath sounds normal    Abdominal: Soft  Bowel sounds are normal  There is no tenderness  Musculoskeletal: Normal range of motion  He exhibits no edema, tenderness or deformity  Skin: Skin is warm and dry  No rash noted  Psychiatric: He has a normal mood and affect  Nursing note and vitals reviewed

## 2019-08-01 ENCOUNTER — TELEPHONE (OUTPATIENT)
Dept: SLEEP CENTER | Facility: CLINIC | Age: 54
End: 2019-08-01

## 2019-08-01 NOTE — TELEPHONE ENCOUNTER
----- Message from John Lewis MD sent at 7/31/2019  5:41 PM EDT -----  Approved  ----- Message -----  From: David Niño  Sent: 7/25/2019   2:34 PM EDT  To: Sleep Medicine Rob Fong, #    PLEASE REVIEW FOR APPROVAL OR DENIAL AND WHY

## 2019-08-22 ENCOUNTER — HOSPITAL ENCOUNTER (OUTPATIENT)
Dept: SLEEP CENTER | Facility: CLINIC | Age: 54
Discharge: HOME | End: 2019-08-22
Payer: COMMERCIAL

## 2019-08-22 DIAGNOSIS — R40.0 DAYTIME SLEEPINESS: ICD-10-CM

## 2019-08-22 DIAGNOSIS — E66.01 MORBID OBESITY (HCC): ICD-10-CM

## 2019-08-22 DIAGNOSIS — I10 ESSENTIAL HYPERTENSION: ICD-10-CM

## 2019-08-22 PROCEDURE — G0399 HOME SLEEP TEST/TYPE 3 PORTA: HCPCS

## 2019-08-24 DIAGNOSIS — G47.33 OSA (OBSTRUCTIVE SLEEP APNEA): Primary | ICD-10-CM

## 2019-08-26 ENCOUNTER — TELEPHONE (OUTPATIENT)
Dept: SLEEP CENTER | Facility: CLINIC | Age: 54
End: 2019-08-26

## 2019-08-26 NOTE — RESULT ENCOUNTER NOTE
I reviewed results and instructions with patient, they will be reaching back out to him to schedule an appointment

## 2019-08-26 NOTE — TELEPHONE ENCOUNTER
Returned patient's call  Advised sleep study shows VICKY  APAP order in [facility]  Patient scheduled consult with Dr Patric Pitts and DME set up    [facility]

## 2019-08-26 NOTE — TELEPHONE ENCOUNTER
Left message for the patient to call back for sleep study results  Sleep study shows mild VICKY  Patient needs consult with Dr Diony Moreno and an appointment for DME set up   APAP order in Epic

## 2019-08-27 NOTE — TELEPHONE ENCOUNTER
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

## 2019-08-28 ENCOUNTER — OFFICE VISIT (OUTPATIENT)
Dept: SLEEP CENTER | Facility: CLINIC | Age: 54
End: 2019-08-28
Payer: COMMERCIAL

## 2019-08-28 VITALS
SYSTOLIC BLOOD PRESSURE: 116 MMHG | BODY MASS INDEX: 46.65 KG/M2 | WEIGHT: 315 LBS | HEIGHT: 69 IN | DIASTOLIC BLOOD PRESSURE: 70 MMHG

## 2019-08-28 DIAGNOSIS — G47.33 OSA (OBSTRUCTIVE SLEEP APNEA): Primary | ICD-10-CM

## 2019-08-28 PROCEDURE — 99203 OFFICE O/P NEW LOW 30 MIN: CPT | Performed by: INTERNAL MEDICINE

## 2019-08-28 NOTE — PROGRESS NOTES
Consultation - [address] : 1965  MRN: [id number]      Assessment:  The patient has moderate obstructive sleep apnea diagnosed on home sleep apnea testing  There was minimal oxygen desaturation and rare central apneas  He is a candidate for therapy with APAP  He does complain of sleepiness and a history of hypertension  He is scheduled to be set up with APAP through [address] [place] [address] St:  APAP 4 to 20 cm    Follow up:  Compliance check    History of Present Illness:   48 y o male with a history of hypertension and excessive daytime sleepiness who underwent home sleep apnea testing and was found to have moderate obstructive sleep apnea  His CONNIE = 18 9  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  He reports waking up sleepy and often is able sleep only 4 to 5 hours  He awakens frequently during the night, approximately 2 to 3 times nightly        Review of Systems      Genitourinary none   Cardiology none   Gastrointestinal none   Neurology none   Constitutional none   Integumentary none   Psychiatry none   Musculoskeletal joint pain, muscle aches, back pain and leg cramps   Pulmonary none   ENT none   Endocrine none   Hematological none         I have reviewed and updated the review of systems as necessary    Historical Information    Past Medical History:  Hypertension, irritable bowel syndrome, asthma, diabetes    Family History: non-contributory    Social History     Socioeconomic History    Marital status: /Civil Union     Spouse name: None    Number of children: None    Years of education: None    Highest education level: None   Occupational History    None   Social Needs    Financial resource strain: None    Food insecurity:     Worry: None     Inability: None    Transportation needs:     Medical: None     Non-medical: None   Tobacco Use    Smoking status: Light Tobacco Smoker     Types: Cigars    Smokeless tobacco: Current User    Tobacco comment: occassional cigars   Substance and Sexual Activity    Alcohol use: Yes     Comment: social    Drug use: No    Sexual activity: None   Lifestyle    Physical activity:     Days per week: None     Minutes per session: None    Stress: None   Relationships    Social connections:     Talks on phone: None     Gets together: None     Attends Voodoo service: None     Active member of club or organization: None     Attends meetings of clubs or organizations: None     Relationship status: None    Intimate partner violence:     Fear of current or ex partner: None     Emotionally abused: None     Physically abused: None     Forced sexual activity: None   Other Topics Concern    None   Social History Narrative    None         Sleep Schedule: unremarkable    Snoring:  Yes    Witnessed Apnea:  Yes    Medications/Allergies:    Current Outpatient Medications:     albuterol (PROVENTIL HFA) 90 mcg/act inhaler, Inhale 2 puffs every 4 (four) hours as needed for wheezing, Disp: 11 Inhaler, Rfl: 5    Blood Glucose Monitoring Suppl (ONE TOUCH ULTRA 2) w/Device KIT, Test blood sugar twice daily, Disp: 1 each, Rfl: 0    glucose blood (ONE TOUCH ULTRA TEST) test strip, Test blood sugar twice daily, Disp: 200 each, Rfl: 3    lisinopril (ZESTRIL) 10 mg tablet, Take 1 tablet (10 mg total) by mouth daily, Disp: 30 tablet, Rfl: 6    metFORMIN (GLUCOPHAGE) 500 mg tablet, Take 2 tab  twice daily with meals (Patient taking differently: 500 mg Take 1 tab  twice daily with meals), Disp: 120 tablet, Rfl: 6    MULTIPLE VITAMINS-CALCIUM PO, Take 1 tablet by mouth daily, Disp: , Rfl:     ONETOUCH DELICA LANCETS 51M MISC, Test blood sugar twice daily, Disp: 200 each, Rfl: 3    atorvastatin (LIPITOR) 10 mg tablet, TAKE 1 TABLET BY MOUTH EVERY DAY (Patient not taking: Reported on 7/17/2019), Disp: 30 tablet, Rfl: 6    cyanocobalamin (VITAMIN B-12) 1,000 mcg tablet, Take 1 tablet (1,000 mcg total) by mouth daily (Patient not taking: Reported on 7/17/2019), Disp: 30 tablet, Rfl: 5    Ergocalciferol (VITAMIN D2) 2000 units TABS, Take 1 tab  daily (Patient not taking: Reported on 7/17/2019), Disp: 1 tablet, Rfl: 5        No notes on file                  Objective:    Vital Signs:   Vitals:    08/28/19 1000   BP: 116/70   Weight: (!) 163 kg (360 lb)   Height: 5' 9" (1 753 m)     Neck Circumference: 17 5      [place] Sleepiness Scale: Total score: 8    Physical Exam:    General: Alert, appropriate, cooperative, overweight    Head: NC/AT, no retrognathia    Nose: No septal deviation, nares not obstructed, mucosa normal    Throat: Airway slightly diminished, tongue base thickened, no tonsils visualized    Heart: RR, normal S1 and S2, no murmurs    Chest: Clear bilaterally    Extremity: No clubbing, cyanosis, 2+ edema    Skin: Warm, dry    Neuro: No motor abnormalities, cranial nerves appear intact    Sleep Study Results:   CONNIE = 18 9  PAP Pressure: Auto PAP: 4 to 20 cm  DME Provider: [address] [place] [address] Medical Equipment    Counseling / Coordination of Care  A description of the counseling / coordination of care: We discussed the treatment of VICKY  Board Certified Sleep Specialist    Portions of the record may have been created with voice recognition software  Occasional wrong word or "sound a like" substitutions may have occurred due to the inherent limitations of voice recognition software  Read the chart carefully and recognize, using context, where substitutions have occurred

## 2019-09-04 ENCOUNTER — TELEPHONE (OUTPATIENT)
Dept: SLEEP CENTER | Facility: CLINIC | Age: 54
End: 2019-09-04

## 2019-12-17 DIAGNOSIS — E11.65 TYPE 2 DIABETES MELLITUS WITH HYPERGLYCEMIA, WITHOUT LONG-TERM CURRENT USE OF INSULIN (HCC): ICD-10-CM

## 2020-01-16 DIAGNOSIS — I10 ESSENTIAL HYPERTENSION: ICD-10-CM

## 2020-01-16 RX ORDER — LISINOPRIL 10 MG/1
TABLET ORAL
Qty: 30 TABLET | Refills: 6 | Status: SHIPPED | OUTPATIENT
Start: 2020-01-16 | End: 2020-01-23 | Stop reason: DRUGHIGH

## 2020-01-23 ENCOUNTER — LAB (OUTPATIENT)
Dept: LAB | Facility: HOSPITAL | Age: 55
End: 2020-01-23
Payer: COMMERCIAL

## 2020-01-23 ENCOUNTER — TRANSCRIBE ORDERS (OUTPATIENT)
Dept: LAB | Facility: HOSPITAL | Age: 55
End: 2020-01-23

## 2020-01-23 ENCOUNTER — OFFICE VISIT (OUTPATIENT)
Dept: FAMILY MEDICINE CLINIC | Facility: CLINIC | Age: 55
End: 2020-01-23
Payer: COMMERCIAL

## 2020-01-23 VITALS
OXYGEN SATURATION: 98 % | WEIGHT: 315 LBS | SYSTOLIC BLOOD PRESSURE: 160 MMHG | TEMPERATURE: 98.3 F | DIASTOLIC BLOOD PRESSURE: 90 MMHG | BODY MASS INDEX: 46.65 KG/M2 | HEART RATE: 80 BPM | HEIGHT: 69 IN | RESPIRATION RATE: 16 BRPM

## 2020-01-23 DIAGNOSIS — M79.672 PAIN IN BOTH FEET: ICD-10-CM

## 2020-01-23 DIAGNOSIS — M79.671 PAIN IN BOTH FEET: ICD-10-CM

## 2020-01-23 DIAGNOSIS — R60.0 EDEMA OF BOTH FEET: ICD-10-CM

## 2020-01-23 DIAGNOSIS — R60.0 EDEMA OF BOTH FEET: Primary | ICD-10-CM

## 2020-01-23 DIAGNOSIS — E11.00 TYPE II DIABETES MELLITUS WITH HYPEROSMOLARITY, UNCONTROLLED (HCC): Primary | ICD-10-CM

## 2020-01-23 DIAGNOSIS — J45.20 MILD INTERMITTENT ASTHMA WITHOUT COMPLICATION: ICD-10-CM

## 2020-01-23 DIAGNOSIS — I10 ESSENTIAL HYPERTENSION: ICD-10-CM

## 2020-01-23 DIAGNOSIS — E11.65 TYPE II DIABETES MELLITUS WITH HYPEROSMOLARITY, UNCONTROLLED (HCC): Primary | ICD-10-CM

## 2020-01-23 DIAGNOSIS — E11.65 TYPE 2 DIABETES MELLITUS WITH HYPERGLYCEMIA, WITHOUT LONG-TERM CURRENT USE OF INSULIN (HCC): ICD-10-CM

## 2020-01-23 LAB — URATE SERPL-MCNC: 4.3 MG/DL (ref 4.2–8)

## 2020-01-23 PROCEDURE — 36415 COLL VENOUS BLD VENIPUNCTURE: CPT

## 2020-01-23 PROCEDURE — 3008F BODY MASS INDEX DOCD: CPT | Performed by: NURSE PRACTITIONER

## 2020-01-23 PROCEDURE — 84550 ASSAY OF BLOOD/URIC ACID: CPT

## 2020-01-23 PROCEDURE — 99214 OFFICE O/P EST MOD 30 MIN: CPT | Performed by: NURSE PRACTITIONER

## 2020-01-23 PROCEDURE — 4010F ACE/ARB THERAPY RXD/TAKEN: CPT | Performed by: NURSE PRACTITIONER

## 2020-01-23 RX ORDER — FUROSEMIDE 20 MG/1
20 TABLET ORAL DAILY
Qty: 10 TABLET | Refills: 0 | Status: SHIPPED | OUTPATIENT
Start: 2020-01-23 | End: 2020-02-19 | Stop reason: SDUPTHER

## 2020-01-23 RX ORDER — LISINOPRIL 20 MG/1
20 TABLET ORAL DAILY
Qty: 30 TABLET | Refills: 5 | Status: SHIPPED | OUTPATIENT
Start: 2020-01-23 | End: 2020-03-26 | Stop reason: SDUPTHER

## 2020-01-23 NOTE — PATIENT INSTRUCTIONS
Uric Acid level checked now  Start Lasix today and take for the next 3-5 days  Blood work in 5-6 days and call me with an update  Also, increase Lisinopril to 20 mg daily (new script at pharmacy)   Start checking blood pressure daily at home and keep a log   Schedule appointment with Dr Jose Rafael Owens for a routine follow up in the next 3 weeks or so

## 2020-01-23 NOTE — ASSESSMENT & PLAN NOTE
BP not optimized  Increase Lisinopril to 20 mg daily  I did advise the pt to start checking his BP BID and to keep a log  Goal is < 140/90  Bring log to follow up visit    Limit sodium intake

## 2020-01-23 NOTE — ASSESSMENT & PLAN NOTE
Unclear etiology  No injuries to feet  Edema does not extend to ankles  To start Lasix 20 mg one tab daily for the next 3-5 days  Lab work ordered for 5 days  Limit sodium intake  I suspect his mild discomfort in his feet is related to the swelling rather than gout  He does not have an classical signs of gout  Regardless, we'll double check a uric acid level now

## 2020-01-23 NOTE — ASSESSMENT & PLAN NOTE
Lab Results   Component Value Date    HGBA1C 7 7 (H) 04/23/2019   Pt is overdue for a routine follow up with our office/ lab work  I did order blood work to be completed  Should be checking BS daily and keeping a log  Bring log to next OV  Schedule follow up in the next few weeks  Continue Metformin    Diet modifications reiterated

## 2020-01-23 NOTE — PROGRESS NOTES
Chief Complaint   Patient presents with    possible Gout     left foot     Health Maintenance   Topic Date Due    CRC Screening: Colonoscopy  1965    Pneumococcal Vaccine: Pediatrics (0 to 5 Years) and At-Risk Patients (6 to 59 Years) (1 of 1 - PPSV23) 05/19/1973    DM Eye Exam  05/19/1977    HIV Screening  05/19/1982    Annual Physical  05/19/1985    Hepatitis B Vaccine (1 of 3 - Risk 3-dose series) 05/19/1986    Influenza Vaccine  07/01/2019    HEMOGLOBIN A1C  10/23/2019    BMI: Followup Plan  04/19/2020    Diabetic Foot Exam  04/19/2020    URINE MICROALBUMIN  04/23/2020    Depression Screening PHQ  07/17/2020    BMI: Adult  08/28/2020    DTaP,Tdap,and Td Vaccines (2 - Td) 06/09/2028    Pneumococcal Vaccine: 65+ Years (1 of 2 - PCV13) 05/19/2032    Hepatitis C Screening  Completed    HIB Vaccine  Aged Out    IPV Vaccine  Aged Out    Hepatitis A Vaccine  Aged Out    Meningococcal ACWY Vaccine  Aged Out    HPV Vaccine  Aged Out     Assessment/Plan:    Type 2 diabetes mellitus with hyperglycemia ([facility] [address] )    Lab Results   Component Value Date    HGBA1C 7 7 (H) 04/23/2019   Pt is overdue for a routine follow up with our office/ lab work  I did order blood work to be completed  Should be checking BS daily and keeping a log  Bring log to next OV  Schedule follow up in the next few weeks  Continue Metformin  Diet modifications reiterated     Mild intermittent asthma without complication  Stable, no current exacerbation   Continue rescue inhaler prn     Essential hypertension  BP not optimized  Increase Lisinopril to 20 mg daily  I did advise the pt to start checking his BP BID and to keep a log  Goal is < 140/90  Bring log to follow up visit  Limit sodium intake     Edema of both feet  Unclear etiology  No injuries to feet  Edema does not extend to ankles  To start Lasix 20 mg one tab daily for the next 3-5 days  Lab work ordered for 5 days  Limit sodium intake   I suspect his mild discomfort in his feet is related to the swelling rather than gout  He does not have an classical signs of gout  Regardless, we'll double check a uric acid level now  Diagnoses and all orders for this visit:    Edema of both feet  -     furosemide (LASIX) 20 mg tablet; Take 1 tablet (20 mg total) by mouth daily  -     Uric acid; Future  -     CBC and differential; Future  -     Comprehensive metabolic panel; Future  -     Lipid panel; Future  -     TSH, 3rd generation with Free T4 reflex; Future    Pain in both feet  -     Uric acid; Future  -     CBC and differential; Future  -     Comprehensive metabolic panel; Future  -     TSH, 3rd generation with Free T4 reflex; Future    Type 2 diabetes mellitus with hyperglycemia, without long-term current use of insulin (HCC)  -     CBC and differential; Future  -     Comprehensive metabolic panel; Future  -     Hemoglobin A1C; Future  -     Lipid panel; Future  -     TSH, 3rd generation with Free T4 reflex; Future    Essential hypertension  -     CBC and differential; Future  -     Comprehensive metabolic panel; Future  -     Lipid panel; Future  -     TSH, 3rd generation with Free T4 reflex; Future  -     lisinopril (ZESTRIL) 20 mg tablet; Take 1 tablet (20 mg total) by mouth daily    Mild intermittent asthma without complication  -     CBC and differential; Future  -     Comprehensive metabolic panel; Future  -     TSH, 3rd generation with Free T4 reflex; Future          Subjective:      Patient ID: Juan Cohen is a 48 y o  male  HPI     Pt presents by himself today for an acute visit   C/o both feet being swollen for the past 1-2 months  Started out with his left foot swollen (mostly on the top but then appeared to be the entire foot)  Reports he went to  in December for this (no records) and they thought it was possibly gout  He did not have a Uric Acid level checked at that time    Was started on 800 mg Ibuprofen for about a week with no improvement Over the past 4 weeks or so, his right foot has started to swell as well   Left foot was painful but no longer having pain in either foot  No redness or warmth  Able to tolerate wearing a shoe  No injuries to feet  Swelling does not extend to the ankles or legs   He has never had this issue before    He denies chest pain, palpitations, SOB    Pt with asthma- has been well controlled, no recent exacerbations  Has a rescue inhaler for prn use    DM type 2- notes his sugars are "good" and range from about 180-low 200s  Currently taking Metformin  He was last here for a routine follow up in July of 2019 with PCP, Dr Uzair Bah  Last A1C from April 2019 at 7 7     HTN- taking Lisinopril 10 mg daily  He does not check his BP at home  Notes his diet is relatively high in sodium     Kidney function from 4/2019: BUN 9, Cr 0 91, eGFR 97    The following portions of the patient's history were reviewed and updated as appropriate: allergies, current medications, past medical history, past social history and problem list     Review of Systems   Constitutional: Negative for chills, fatigue and fever  Respiratory: Negative for cough, shortness of breath and wheezing  Cardiovascular: Negative for chest pain, palpitations and leg swelling  Gastrointestinal: Negative for abdominal pain, blood in stool, constipation, diarrhea and nausea  Genitourinary: Negative for dysuria  Musculoskeletal: Positive for arthralgias (B/L feet)  Negative for myalgias  Skin: Negative for rash and wound  Neurological: Negative for dizziness, weakness, numbness and headaches  Hematological: Negative for adenopathy  Does not bruise/bleed easily  Objective:      /90   Pulse 80   Temp 98 3 °F (36 8 °C) (Tympanic)   Resp 16   Ht 5' 9" (1 753 m)   Wt (!) 168 kg (370 lb)   SpO2 98%   BMI 54 64 kg/m²          Physical Exam   Constitutional: He is oriented to person, place, and time   He appears well-developed and well-nourished  No distress  Morbidly obese   HENT:   Head: Normocephalic and atraumatic  Eyes: Pupils are equal, round, and reactive to light  Conjunctivae are normal    Neck: Normal range of motion  Neck supple  No thyromegaly present  Cardiovascular: Normal rate, regular rhythm and normal heart sounds  No murmur heard  B/L feet with +2 edema  This does not extend to his ankles at all  No redness, warmth or pain on palpation  Cap refill < 2 seconds B/L feet  +2 pedal pulses    Pulmonary/Chest: Effort normal and breath sounds normal  No respiratory distress  He has no wheezes  Abdominal: Soft  Bowel sounds are normal  He exhibits no distension  There is no tenderness  Musculoskeletal: Normal range of motion  Lymphadenopathy:     He has no cervical adenopathy  Neurological: He is alert and oriented to person, place, and time  Skin: Skin is warm and dry  He is not diaphoretic  Psychiatric: He has a normal mood and affect

## 2020-01-28 ENCOUNTER — APPOINTMENT (OUTPATIENT)
Dept: LAB | Facility: HOSPITAL | Age: 55
End: 2020-01-28
Payer: COMMERCIAL

## 2020-01-28 DIAGNOSIS — E11.65 TYPE 2 DIABETES MELLITUS WITH HYPERGLYCEMIA, WITHOUT LONG-TERM CURRENT USE OF INSULIN (HCC): ICD-10-CM

## 2020-01-28 DIAGNOSIS — E11.65 TYPE II DIABETES MELLITUS WITH HYPEROSMOLARITY, UNCONTROLLED (HCC): ICD-10-CM

## 2020-01-28 DIAGNOSIS — J45.20 MILD INTERMITTENT ASTHMA WITHOUT COMPLICATION: ICD-10-CM

## 2020-01-28 DIAGNOSIS — M79.671 PAIN IN BOTH FEET: ICD-10-CM

## 2020-01-28 DIAGNOSIS — M79.672 PAIN IN BOTH FEET: ICD-10-CM

## 2020-01-28 DIAGNOSIS — R60.0 EDEMA OF BOTH FEET: ICD-10-CM

## 2020-01-28 DIAGNOSIS — I10 ESSENTIAL HYPERTENSION: ICD-10-CM

## 2020-01-28 DIAGNOSIS — E11.00 TYPE II DIABETES MELLITUS WITH HYPEROSMOLARITY, UNCONTROLLED (HCC): ICD-10-CM

## 2020-01-28 LAB
ALBUMIN SERPL BCP-MCNC: 3.2 G/DL (ref 3.5–5)
ALP SERPL-CCNC: 89 U/L (ref 46–116)
ALT SERPL W P-5'-P-CCNC: 31 U/L (ref 12–78)
ANION GAP SERPL CALCULATED.3IONS-SCNC: 6 MMOL/L (ref 4–13)
AST SERPL W P-5'-P-CCNC: 34 U/L (ref 5–45)
BASOPHILS # BLD AUTO: 0.05 THOUSANDS/ΜL (ref 0–0.1)
BASOPHILS NFR BLD AUTO: 1 % (ref 0–1)
BILIRUB SERPL-MCNC: 1.2 MG/DL (ref 0.2–1)
BUN SERPL-MCNC: 6 MG/DL (ref 5–25)
CALCIUM SERPL-MCNC: 8.6 MG/DL (ref 8.3–10.1)
CHLORIDE SERPL-SCNC: 97 MMOL/L (ref 100–108)
CHOLEST SERPL-MCNC: 215 MG/DL (ref 50–200)
CO2 SERPL-SCNC: 32 MMOL/L (ref 21–32)
CREAT SERPL-MCNC: 0.78 MG/DL (ref 0.6–1.3)
EOSINOPHIL # BLD AUTO: 0.26 THOUSAND/ΜL (ref 0–0.61)
EOSINOPHIL NFR BLD AUTO: 4 % (ref 0–6)
ERYTHROCYTE [DISTWIDTH] IN BLOOD BY AUTOMATED COUNT: 12.3 % (ref 11.6–15.1)
EST. AVERAGE GLUCOSE BLD GHB EST-MCNC: 180 MG/DL
GFR SERPL CREATININE-BSD FRML MDRD: 104 ML/MIN/1.73SQ M
GLUCOSE SERPL-MCNC: 232 MG/DL (ref 65–140)
HBA1C MFR BLD: 7.9 % (ref 4.2–6.3)
HCT VFR BLD AUTO: 44.7 % (ref 36.5–49.3)
HDLC SERPL-MCNC: 46 MG/DL
HGB BLD-MCNC: 14.9 G/DL (ref 12–17)
IMM GRANULOCYTES # BLD AUTO: 0.04 THOUSAND/UL (ref 0–0.2)
IMM GRANULOCYTES NFR BLD AUTO: 1 % (ref 0–2)
LDLC SERPL CALC-MCNC: 144 MG/DL (ref 0–100)
LYMPHOCYTES # BLD AUTO: 1.4 THOUSANDS/ΜL (ref 0.6–4.47)
LYMPHOCYTES NFR BLD AUTO: 19 % (ref 14–44)
MCH RBC QN AUTO: 31.3 PG (ref 26.8–34.3)
MCHC RBC AUTO-ENTMCNC: 33.3 G/DL (ref 31.4–37.4)
MCV RBC AUTO: 94 FL (ref 82–98)
MONOCYTES # BLD AUTO: 0.69 THOUSAND/ΜL (ref 0.17–1.22)
MONOCYTES NFR BLD AUTO: 9 % (ref 4–12)
NEUTROPHILS # BLD AUTO: 4.98 THOUSANDS/ΜL (ref 1.85–7.62)
NEUTS SEG NFR BLD AUTO: 66 % (ref 43–75)
NONHDLC SERPL-MCNC: 169 MG/DL
NRBC BLD AUTO-RTO: 0 /100 WBCS
PLATELET # BLD AUTO: 222 THOUSANDS/UL (ref 149–390)
PMV BLD AUTO: 11.2 FL (ref 8.9–12.7)
POTASSIUM SERPL-SCNC: 3.9 MMOL/L (ref 3.5–5.3)
PROT SERPL-MCNC: 6.8 G/DL (ref 6.4–8.2)
RBC # BLD AUTO: 4.76 MILLION/UL (ref 3.88–5.62)
SODIUM SERPL-SCNC: 135 MMOL/L (ref 136–145)
TRIGL SERPL-MCNC: 126 MG/DL
TSH SERPL DL<=0.05 MIU/L-ACNC: 3.03 UIU/ML (ref 0.36–3.74)
VENIPUNCTURE: NORMAL
WBC # BLD AUTO: 7.42 THOUSAND/UL (ref 4.31–10.16)

## 2020-01-28 PROCEDURE — 36415 COLL VENOUS BLD VENIPUNCTURE: CPT

## 2020-01-28 PROCEDURE — 3051F HG A1C>EQUAL 7.0%<8.0%: CPT | Performed by: FAMILY MEDICINE

## 2020-01-28 PROCEDURE — 80053 COMPREHEN METABOLIC PANEL: CPT

## 2020-01-28 PROCEDURE — 84443 ASSAY THYROID STIM HORMONE: CPT

## 2020-01-28 PROCEDURE — 83036 HEMOGLOBIN GLYCOSYLATED A1C: CPT

## 2020-01-28 PROCEDURE — 85025 COMPLETE CBC W/AUTO DIFF WBC: CPT

## 2020-01-28 PROCEDURE — 80061 LIPID PANEL: CPT

## 2020-01-31 ENCOUNTER — TELEPHONE (OUTPATIENT)
Dept: FAMILY MEDICINE CLINIC | Facility: CLINIC | Age: 55
End: 2020-01-31

## 2020-01-31 NOTE — TELEPHONE ENCOUNTER
After talking to Dr Asuncion Owens, it was decided that we would have the patient finish out the medication he had at home and give us a call Monday or Tuesday and let us know how he is doing he did have blood work done so hopefully we will have them back by then too   Patient made aware and given these instructions, and agreed

## 2020-01-31 NOTE — TELEPHONE ENCOUNTER
Patient was seen by Jas Moreno last week for swelling of foot  On Lasix for ten days  Swelling is down, no complaints of pain  Has 2 Lasix left, should he continue or get more?

## 2020-02-19 ENCOUNTER — OFFICE VISIT (OUTPATIENT)
Dept: FAMILY MEDICINE CLINIC | Facility: CLINIC | Age: 55
End: 2020-02-19
Payer: COMMERCIAL

## 2020-02-19 ENCOUNTER — TELEPHONE (OUTPATIENT)
Dept: ADMINISTRATIVE | Facility: OTHER | Age: 55
End: 2020-02-19

## 2020-02-19 VITALS
HEIGHT: 69 IN | HEART RATE: 86 BPM | BODY MASS INDEX: 46.65 KG/M2 | TEMPERATURE: 98.5 F | RESPIRATION RATE: 20 BRPM | WEIGHT: 315 LBS | SYSTOLIC BLOOD PRESSURE: 142 MMHG | OXYGEN SATURATION: 98 % | DIASTOLIC BLOOD PRESSURE: 96 MMHG

## 2020-02-19 DIAGNOSIS — R60.0 EDEMA OF BOTH FEET: ICD-10-CM

## 2020-02-19 DIAGNOSIS — R60.0 BILATERAL LEG EDEMA: ICD-10-CM

## 2020-02-19 DIAGNOSIS — E78.2 MIXED HYPERLIPIDEMIA: ICD-10-CM

## 2020-02-19 DIAGNOSIS — E66.01 MORBID OBESITY (HCC): ICD-10-CM

## 2020-02-19 DIAGNOSIS — E11.65 TYPE 2 DIABETES MELLITUS WITH HYPERGLYCEMIA, WITHOUT LONG-TERM CURRENT USE OF INSULIN (HCC): ICD-10-CM

## 2020-02-19 DIAGNOSIS — I10 ESSENTIAL HYPERTENSION: Primary | ICD-10-CM

## 2020-02-19 PROCEDURE — 3008F BODY MASS INDEX DOCD: CPT | Performed by: FAMILY MEDICINE

## 2020-02-19 PROCEDURE — 3051F HG A1C>EQUAL 7.0%<8.0%: CPT | Performed by: FAMILY MEDICINE

## 2020-02-19 PROCEDURE — 3080F DIAST BP >= 90 MM HG: CPT | Performed by: FAMILY MEDICINE

## 2020-02-19 PROCEDURE — 99215 OFFICE O/P EST HI 40 MIN: CPT | Performed by: FAMILY MEDICINE

## 2020-02-19 PROCEDURE — 3077F SYST BP >= 140 MM HG: CPT | Performed by: FAMILY MEDICINE

## 2020-02-19 RX ORDER — FUROSEMIDE 20 MG/1
TABLET ORAL
Qty: 60 TABLET | Refills: 5 | Status: SHIPPED | OUTPATIENT
Start: 2020-02-19 | End: 2021-01-15

## 2020-02-19 RX ORDER — ATORVASTATIN CALCIUM 10 MG/1
10 TABLET, FILM COATED ORAL DAILY
Qty: 30 TABLET | Refills: 5 | Status: SHIPPED | OUTPATIENT
Start: 2020-02-19 | End: 2020-08-26

## 2020-02-19 NOTE — TELEPHONE ENCOUNTER
Upon review of the In Basket request and the patient's chart, initial outreach has been made via fax, please see Contacts section for details  A second outreach attempt will be made within 3 business days      Thank you  Raymundo Smiley MA

## 2020-02-19 NOTE — PROGRESS NOTES
Chief Complaint   Patient presents with    Results     Lab Results    Foot Swelling     Left Foot Swelling     Health Maintenance   Topic Date Due    CRC Screening: Colonoscopy  1965    Pneumococcal Vaccine: Pediatrics (0 to 5 Years) and At-Risk Patients (6 to 59 Years) (1 of 1 - PPSV23) 05/19/1973    DM Eye Exam  05/19/1977    HIV Screening  05/19/1982    Annual Physical  05/19/1985    Influenza Vaccine  02/19/2021 (Originally 7/1/2019)    BMI: Followup Plan  04/19/2020    Depression Screening PHQ  07/17/2020    HEMOGLOBIN A1C  07/28/2020    BMI: Adult  02/19/2021    Diabetic Foot Exam  02/19/2021    DTaP,Tdap,and Td Vaccines (2 - Td) 06/09/2028    Pneumococcal Vaccine: 65+ Years (1 of 2 - PCV13) 05/19/2032    Hepatitis C Screening  Completed    HIB Vaccine  Aged Out    Hepatitis B Vaccine  Aged Out    IPV Vaccine  Aged Out    Hepatitis A Vaccine  Aged Out    Meningococcal ACWY Vaccine  Aged Out    HPV Vaccine  Aged Out     Assessment/Plan:    Essential hypertension  Goal for BP < 140/80  Continue Lisinopril 20 mg daily  Follow a low-sodium diet  Lose weight  Consider increasing dose of Lisinopril if blood pressure remains elevated  Type 2 diabetes mellitus with hyperglycemia (HCC)    Lab Results   Component Value Date    HGBA1C 7 9 (H) 01/28/2020     Goal for Hb A1C < 7 0  Continue Metformin 500 mg 1 tablet twice daily  Start Januvia 100 mg 1 tablet daily  Discussed dietary modifications with patient  Encouraged weight reduction  Monitor blood sugar at home twice daily and bring blood sugar log for next visit in 2 weeks  Check eye exam by ophthalmologist annually  Morbid obesity ([address] )  Follow a low carb, low-fat diet  Encouraged regular exercise, work on weight reduction  Consider referral to weight management  Bilateral leg edema  Start Furosemide 40 mg daily for 5 days, then decrease dose to 20 mg daily if leg edema improves      Follow a low-sodium diet   Will check Venous Doppler BL LE to rule out DVT, venous insufficiency  Check BMP in 2 weeks  Mixed hyperlipidemia  Take Atorvastatin 10 mg daily  Follow a low-cholesterol, low-fat diet  I have spent 40 minutes with Patient  today in which greater than 50% of this time was spent in counseling/coordination of care regarding Diagnostic results, Risks and benefits of tx options, Intructions for management, Patient and family education, Importance of tx compliance, Risk factor reductions and Impressions  Schedule follow-up visit in 2 -3 weeks  Diagnoses and all orders for this visit:    Essential hypertension  -     Echo complete with contrast if indicated; Future  -     Basic metabolic panel; Future    Type 2 diabetes mellitus with hyperglycemia, without long-term current use of insulin (HCC)  -     sitaGLIPtin (JANUVIA) 100 mg tablet; Take 1 tablet (100 mg total) by mouth daily    Mixed hyperlipidemia  -     atorvastatin (LIPITOR) 10 mg tablet; Take 1 tablet (10 mg total) by mouth daily    Morbid obesity (HCC)    Bilateral leg edema  -     VAS lower limb venous duplex study, complete bilateral; Future  -     Echo complete with contrast if indicated; Future  -     Basic metabolic panel; Future    Edema of both feet  -     furosemide (LASIX) 20 mg tablet; Take 2 tab  daily for 5 days, then take 1 tab  daily          Subjective:      Patient ID: Kristina Stubbs is a 48 y o  male  HPI     Patient is 82-year-old male with past medical history of HTN, Type 2 DM, Hyperlipidemia  He presents today for a follow-up office visit, review blood test results  Reviewed current medications, blood test results from 1/28/20  TSH 3 030,  fasting blood sugar 232, creatinine 0 78, potassium 3 9, Hb A1C 7 9  Cholesterol 215, HDL 46, , triglycerides 126  Patient c/o bilateral leg edema worse on the left side        He was seen by RODRIGUE Aj  last month, started on Lasix 20 mg daily  Patient took Lasix  for 5- 7 days,  leg swelling improved and he stopped taking medication  HTN - dose of Lisinopril was increased to 20 mg daily at the last visit with CRNP  Patient denies chest pain, shortness of breath, dizziness  No prior history of DVT  Type 2 DM  -patient takes Metformin 500 mg 1 tablet twice daily  He could not tolerate higher dose due to GI side effects, had diarrhea  Eye exam done last year  Patient has not been followed by podiatrist   Denies tingling, numbness in feet  Denies tobacco use  The following portions of the patient's history were reviewed and updated as appropriate: allergies, current medications, past family history, past social history, past surgical history and problem list     Review of Systems   Constitutional: Negative for activity change, appetite change, chills, fatigue and fever  HENT: Negative for congestion, ear pain, hearing loss, sore throat, tinnitus and trouble swallowing  Eyes: Negative for pain, discharge, redness, itching and visual disturbance  Respiratory: Negative for cough, chest tightness, shortness of breath and wheezing  Cardiovascular: Positive for leg swelling  Negative for chest pain and palpitations  Gastrointestinal: Negative for abdominal pain, blood in stool, constipation, diarrhea, nausea and vomiting  Genitourinary: Negative for difficulty urinating, dysuria, flank pain, frequency and hematuria  Musculoskeletal: Positive for arthralgias  Negative for back pain, gait problem, joint swelling and neck pain  Skin: Negative for rash  Neurological: Negative for dizziness, syncope, numbness and headaches  Hematological: Negative  Psychiatric/Behavioral: Negative            Objective:      /96 (BP Location: Left arm, Patient Position: Sitting, Cuff Size: Large)   Pulse 86   Temp 98 5 °F (36 9 °C) (Tympanic)   Resp 20   Ht 5' 9" (1 753 m)   Wt (!) 172 kg (380 lb)   SpO2 98% BMI 56 12 kg/m²     /96  Physical Exam   Constitutional: He appears well-developed and well-nourished  Morbidly obese   HENT:   Head: Normocephalic and atraumatic  Right Ear: External ear normal    Left Ear: External ear normal    Mouth/Throat: Oropharynx is clear and moist    Eyes: Pupils are equal, round, and reactive to light  Conjunctivae are normal    Neck: Normal range of motion  Neck supple  No JVD present  Cardiovascular: Normal rate, regular rhythm and normal heart sounds  Pulses are no weak pulses  No murmur heard  Pulses:       Dorsalis pedis pulses are 2+ on the right side, and 2+ on the left side  BL LE edema 2+  Feet are swollen over the dorsum  No carotid bruits BL   Pulmonary/Chest: Effort normal and breath sounds normal    Abdominal: Soft  Bowel sounds are normal  There is no tenderness  Musculoskeletal: Normal range of motion  He exhibits no edema, tenderness or deformity  Feet:   Right Foot:   Skin Integrity: Negative for ulcer, skin breakdown, erythema, warmth, callus or dry skin  Left Foot:   Skin Integrity: Negative for ulcer, skin breakdown, erythema, warmth, callus or dry skin  Skin: Skin is warm and dry  No rash noted  Psychiatric: He has a normal mood and affect  Nursing note and vitals reviewed  Patient's shoes and socks removed  Right Foot/Ankle   Right Foot Inspection  Skin Exam: skin normal and skin intact no dry skin, no warmth, no callus, no erythema, no maceration, no abnormal color, no pre-ulcer, no ulcer and no callus                          Toe Exam: no swelling, no tenderness, erythema and  no right toe deformity  Sensory       Monofilament testing: intact  Vascular    The right DP pulse is 2+       Left Foot/Ankle  Left Foot Inspection  Skin Exam: skin normal and skin intactno dry skin, no warmth, no erythema, no maceration, normal color, no pre-ulcer, no ulcer and no callus                         Toe Exam: no swelling, no tenderness, no erythema and no left toe deformity                   Sensory       Monofilament: intact  Vascular    The left DP pulse is 2+  Assign Risk Category:  No deformity present; No loss of protective sensation;  No weak pulses       Risk: 0

## 2020-02-19 NOTE — LETTER
Diabetic Eye Exam Form    Date Requested: 20  Patient: Tricia Valiente  Patient : 1965   Referring Provider: Pat August MD    Dilated Retinal Exam, Optomap-Iris Exam, or Fundus Photography Done         Yes ([place] one above)         No     Date of Diabetic Eye Exam ______________________________  Left Eye      Exam did show retinopathy    Exam did not show retinopathy         Mild       Moderate       None       Proliferative       Severe     Right Eye     Exam did show retinopathy    Exam did not show retinopathy         Mild       Moderate       None       Proliferative       Severe     Comments __________________________________________________________    Practice Providing Exam ______________________________________________    Exam Performed By (print name) _______________________________________      Provider Signature ___________________________________________________      These reports are needed for  compliance    Please fax this completed form and a copy of the Diabetic Eye Exam report to our office located at [place] [address] as soon as possible to [phone] valentín Dhillon: Phone [phone]    We thank you for your assistance in treating our mutual patient

## 2020-02-19 NOTE — TELEPHONE ENCOUNTER
----- Message from Elias Francisco MA sent at 2/19/2020 11:32 AM EST -----  Regarding: DM Eye Exam- [facility]  Contact: [phone]  02/19/20 11:33 AM    Hello, our patient Kristina Stubbs has had Diabetic Eye Exam completed/performed  Please assist in updating the patient chart by making an External outreach to [address] [place] [address] facility located on [address] in [place]  The date of service is around 6/2019      Thank you,  Elias Francisco MA  [facility]

## 2020-02-20 NOTE — ASSESSMENT & PLAN NOTE
Goal for BP < 140/80  Continue Lisinopril 20 mg daily  Follow a low-sodium diet  Lose weight  Consider increasing dose of Lisinopril if blood pressure remains elevated

## 2020-02-20 NOTE — ASSESSMENT & PLAN NOTE
Lab Results   Component Value Date    HGBA1C 7 9 (H) 01/28/2020     Goal for Hb A1C < 7 0  Continue Metformin 500 mg 1 tablet twice daily  Start Januvia 100 mg 1 tablet daily  Discussed dietary modifications with patient  Encouraged weight reduction  Monitor blood sugar at home twice daily and bring blood sugar log for next visit in 2 weeks  Check eye exam by ophthalmologist annually

## 2020-02-20 NOTE — ASSESSMENT & PLAN NOTE
Start Furosemide 40 mg daily for 5 days, then decrease dose to 20 mg daily if leg edema improves  Follow a low-sodium diet  Will check Venous Doppler BL LE to rule out DVT, venous insufficiency  Check BMP in 2 weeks

## 2020-02-20 NOTE — ASSESSMENT & PLAN NOTE
Follow a low carb, low-fat diet  Encouraged regular exercise, work on weight reduction  Consider referral to weight management

## 2020-02-24 NOTE — TELEPHONE ENCOUNTER
As a follow-up, a second attempt has been made for outreach via fax, please see Contacts section for details  A third and final attempt will be made within 3 business days  Called office spoke to Christopher Hobbs as report was not completed and notes do not indicate if eye dilated of if patient has retinopathy    She will check with a tech as this provider is no longer at practice     Thank you  Aracely Newton MA

## 2020-02-27 NOTE — TELEPHONE ENCOUNTER
As a final attempt, a third outreach has been made via telephone call  Please see Contacts section for details  This encounter will be closed and completed by end of day  Should we receive the requested information because of previous outreach attempts, the requested patient's chart will be updated appropriately       Thank you  Rachel Yancey MA

## 2020-03-03 ENCOUNTER — HOSPITAL ENCOUNTER (OUTPATIENT)
Dept: NON INVASIVE DIAGNOSTICS | Facility: CLINIC | Age: 55
Discharge: HOME | End: 2020-03-03
Payer: COMMERCIAL

## 2020-03-03 DIAGNOSIS — R60.0 BILATERAL LEG EDEMA: ICD-10-CM

## 2020-03-03 PROCEDURE — 93970 EXTREMITY STUDY: CPT

## 2020-03-03 PROCEDURE — 93970 EXTREMITY STUDY: CPT | Performed by: SURGERY

## 2020-03-06 NOTE — TELEPHONE ENCOUNTER
Upon review of the In Basket request we were able to locate, review, and update the patient chart as requested for Diabetic Eye Exam     Any additional questions or concerns should be emailed to the Practice Liaisons via [email or web address] email, please do not reply via In Basket      Thank you  Chester Lozoya MA

## 2020-03-25 ENCOUNTER — HOSPITAL ENCOUNTER (OUTPATIENT)
Dept: NON INVASIVE DIAGNOSTICS | Facility: CLINIC | Age: 55
Discharge: HOME | End: 2020-03-25
Payer: COMMERCIAL

## 2020-03-25 DIAGNOSIS — R60.0 BILATERAL LEG EDEMA: ICD-10-CM

## 2020-03-25 DIAGNOSIS — I10 ESSENTIAL HYPERTENSION: ICD-10-CM

## 2020-03-25 DIAGNOSIS — I51.9 DECREASED LEFT VENTRICULAR SYSTOLIC FUNCTION: Primary | ICD-10-CM

## 2020-03-25 PROCEDURE — 93306 TTE W/DOPPLER COMPLETE: CPT

## 2020-03-25 RX ADMIN — PERFLUTREN 1.6 ML/MIN: 6.52 INJECTION, SUSPENSION INTRAVENOUS at 14:18

## 2020-03-25 NOTE — RESULT ENCOUNTER NOTE
I reviewed the results and instructions with patient   Scheduled him for a virtual video visit tomorrow at [address] [place] [address]

## 2020-03-26 ENCOUNTER — TELEMEDICINE (OUTPATIENT)
Dept: FAMILY MEDICINE CLINIC | Facility: CLINIC | Age: 55
End: 2020-03-26
Payer: COMMERCIAL

## 2020-03-26 DIAGNOSIS — E66.01 MORBID OBESITY (HCC): ICD-10-CM

## 2020-03-26 DIAGNOSIS — E11.65 TYPE 2 DIABETES MELLITUS WITH HYPERGLYCEMIA, WITHOUT LONG-TERM CURRENT USE OF INSULIN (HCC): ICD-10-CM

## 2020-03-26 DIAGNOSIS — E78.2 MIXED HYPERLIPIDEMIA: ICD-10-CM

## 2020-03-26 DIAGNOSIS — R60.0 BILATERAL LEG EDEMA: ICD-10-CM

## 2020-03-26 DIAGNOSIS — I10 ESSENTIAL HYPERTENSION: Primary | ICD-10-CM

## 2020-03-26 DIAGNOSIS — I51.9 DECREASED LEFT VENTRICULAR SYSTOLIC FUNCTION: ICD-10-CM

## 2020-03-26 PROCEDURE — 3077F SYST BP >= 140 MM HG: CPT | Performed by: FAMILY MEDICINE

## 2020-03-26 PROCEDURE — 3080F DIAST BP >= 90 MM HG: CPT | Performed by: FAMILY MEDICINE

## 2020-03-26 PROCEDURE — 3051F HG A1C>EQUAL 7.0%<8.0%: CPT | Performed by: FAMILY MEDICINE

## 2020-03-26 PROCEDURE — 99214 OFFICE O/P EST MOD 30 MIN: CPT | Performed by: FAMILY MEDICINE

## 2020-03-26 PROCEDURE — 4010F ACE/ARB THERAPY RXD/TAKEN: CPT | Performed by: FAMILY MEDICINE

## 2020-03-26 RX ORDER — LISINOPRIL 20 MG/1
TABLET ORAL
Qty: 45 TABLET | Refills: 5 | Status: SHIPPED | OUTPATIENT
Start: 2020-03-26 | End: 2020-09-30

## 2020-03-26 RX ORDER — LISINOPRIL 10 MG/1
TABLET ORAL
COMMUNITY
Start: 2020-03-20 | End: 2020-03-26 | Stop reason: SDUPTHER

## 2020-03-26 NOTE — ASSESSMENT & PLAN NOTE
Echocardiogram done yesterday showed EF 40-45%  No regional wall motion abnormalities  Recommended cardiology evaluation  Stressed the importance of keeping blood pressure under control, lose weight, improve blood sugar control  Recommended to decrease alcohol consumption, avoid smoking cigars

## 2020-03-26 NOTE — PROGRESS NOTES
Virtual Regular Visit    Problem List Items Addressed This Visit        Endocrine    Type 2 diabetes mellitus with hyperglycemia ([facility] )       Lab Results   Component Value Date    HGBA1C 7 9 (H) 01/28/2020     Goal for Hb A1C < 7 0  Continue Januvia 100 mg 1 tablet daily, Metformin 500 mg 1 tablet twice daily with meals  Follow a low carb diet, avoid concentrated sweets  Relevant Orders    Comprehensive metabolic panel    Hemoglobin A1C    Microalbumin / creatinine urine ratio       Cardiovascular and Mediastinum    Essential hypertension - Primary     Recommended to increase dose of Lisinopril 20 mg to 1 5 tab  daily  Start checking BP at home and call with BP log next week  Check BMP next week ( order in chart)  Relevant Medications    lisinopril (ZESTRIL) 20 mg tablet    Other Relevant Orders    Comprehensive metabolic panel       Other    Mixed hyperlipidemia     Continue Atorvastatin 10 mg daily  Follow a low-cholesterol, low-fat diet  Relevant Orders    Lipid panel    Morbid obesity ([facility] )     Encouraged weight reduction  Bilateral leg edema     Patient reports improvement in leg edema since last month  Continue Furosemide 20 mg daily  Follow a low-sodium diet  Relevant Orders    Comprehensive metabolic panel    Decreased left ventricular systolic function     Echocardiogram done yesterday showed EF 40-45%  No regional wall motion abnormalities  Recommended cardiology evaluation  Stressed the importance of keeping blood pressure under control, lose weight, improve blood sugar control  Recommended to decrease alcohol consumption, avoid smoking cigars  Schedule follow-up visit in 4 months  Check labs before next visit      Reason for visit is     Chief Complaint   Patient presents with    Follow-up     1 month follow up    Virtual Regular Visit     Encounter provider Jena Holland MD    [facility] [address] [place] [id number]      Recent Visits  No visits were found meeting these conditions  Showing recent visits within past 7 days and meeting all other requirements     Today's Visits  Date Type Provider Dept   03/26/20 Telemedicine Joshua Prieto MD [address] [facility] today's visits and meeting all other requirements     Future Appointments  No visits were found meeting these conditions  Showing future appointments within next 150 days and meeting all other requirements        After connecting through [employer], the patient was identified by name and date of birth  Ciara Farr was informed that this is a telemedicine visit and that the visit is being conducted through [employer] and patient was informed that this is not a secure, HIPAA-complaint platform  he agrees to proceed  which may not be secure and therefore, might not be HIPAA-compliant  My office door was closed  No one else was in the room  He acknowledged consent and understanding of privacy and security of the video platform  The patient has agreed to participate and understands they can discontinue the visit at any time  Subjective  Ciara Farr is a 48 y o  male   4 weeks follow-up visit  PMHx: HTN, Type 2 DM, Hyperlipidemia, BL LE edema, Morbid obesity  Patient was seen in the office on 2/19/20 c/o bilateral leg edema  He was recommended to start Furosemide 40 mg daily for 5 days, then decrease dose to 20 mg daily if leg edema improves  Patient reports improvement in BL leg swelling  Venous Doppler BL LE done on March 3, 2019 was negative for DVT  Patient takes Furosemide 20 mg daily  Denies leg cramps  No chest pain, no SOB  He has order to check BMP  HTN - currently taking Lisinopril 20 mg daily  BP last month was 142/96  Patient has not been checking BPat home, plans to obtain BP monitor for home use      Reviewed echocardiogram done on 3/25/20  EF 40-45%  There were no regional wall motion abnormalities  Left atrium was mildly dilated  No significant heart valve abnormalities  Type 2 DM - patient was started on Januvia 100 mg daily last month  He takes Metformin 500 mg 1 tablet twice daily  Blood sugar at home ranges in 180's  Patient tries to follow a low carb diet  Blood test from January 28, 2020 showed potassium 3 9, creatinine 0 78, Hb A1c 7 9  Hyperlipidemia- on Atorvastatin 10 mg daily  Patient drinks vodka 1-2 drinks daily  Smokes cigars occasionally        Past Medical History:   Diagnosis Date    Acute gastritis without hemorrhage     last assessed 3/24/17    Asthma     Diabetes mellitus ([facility] )     Gastric bypass status for obesity     Hyperlipidemia     Hypertension     Impaired fasting glucose     last assessed 5/10/17    Obesity     Viral gastroenteritis     last assessed 11/4/16       Past Surgical History:   Procedure Laterality Date    CARPAL TUNNEL RELEASE      bilateral    GASTRIC BYPASS  2004    with han en y   [address]      ventral inscisional    TONSILLECTOMY         Current Outpatient Medications   Medication Sig Dispense Refill    albuterol (PROVENTIL HFA) 90 mcg/act inhaler Inhale 2 puffs every 4 (four) hours as needed for wheezing (Patient not taking: Reported on 2/19/2020) 11 Inhaler 5    atorvastatin (LIPITOR) 10 mg tablet Take 1 tablet (10 mg total) by mouth daily 30 tablet 5    Blood Glucose Monitoring Suppl (ONE TOUCH ULTRA 2) w/Device KIT Test blood sugar twice daily 1 each 0    furosemide (LASIX) 20 mg tablet Take 2 tab  daily for 5 days, then take 1 tab  daily 60 tablet 5    glucose blood (ONE TOUCH ULTRA TEST) test strip Test blood sugar twice daily 200 each 3    lisinopril (ZESTRIL) 20 mg tablet Take 1 5 tab  daily 45 tablet 5    metFORMIN (GLUCOPHAGE) 500 mg tablet TAKE 1 TABLET (500 MG TOTAL) BY MOUTH 2 (TWO) TIMES A DAY AFTER MEALS (Patient taking differently: Take 500 mg by mouth 2 (two) times a day with meals After meals) 60 tablet 6    MULTIPLE VITAMINS-CALCIUM PO Take 1 tablet by mouth daily      ONETOUCH DELICA LANCETS 63R MISC Test blood sugar twice daily 200 each 3    sitaGLIPtin (JANUVIA) 100 mg tablet Take 1 tablet (100 mg total) by mouth daily 30 tablet 6     No current facility-administered medications for this visit  Allergies   Allergen Reactions    Bactrim [Sulfamethoxazole-Trimethoprim] Other (See Comments)     shakiness       Review of Systems   Constitutional: Negative for activity change, appetite change, chills, fatigue and fever  HENT: Negative for congestion, mouth sores, nosebleeds, postnasal drip, sore throat, tinnitus and trouble swallowing  Eyes: Negative for pain, discharge, redness, itching and visual disturbance  Respiratory: Negative for cough, chest tightness, shortness of breath and wheezing  Cardiovascular: Positive for leg swelling (improved)  Negative for chest pain and palpitations  Gastrointestinal: Negative for abdominal pain, blood in stool, constipation, diarrhea, nausea and vomiting  Genitourinary: Negative for difficulty urinating, dysuria, flank pain, frequency and hematuria  Musculoskeletal: Negative for arthralgias, back pain, joint swelling, myalgias and neck pain  Skin: Negative for rash  Neurological: Negative for dizziness, syncope and headaches  Hematological: Negative  Psychiatric/Behavioral: Negative  Physical Exam   Constitutional: He appears well-developed and well-nourished  Morbidly obese   HENT:   Head: Normocephalic and atraumatic  Eyes: Pupils are equal, round, and reactive to light  Conjunctivae are normal    Neck: Normal range of motion  No JVD present  Cardiovascular:   BL LE edema 1+    Pulmonary/Chest: Effort normal  He has no wheezes  Musculoskeletal: Normal range of motion  He exhibits no edema or deformity  Skin: Skin is warm and dry   No rash noted  Psychiatric: He has a normal mood and affect  Nursing note and vitals reviewed  I spent 20 minutes with the patient during this visit

## 2020-03-26 NOTE — ASSESSMENT & PLAN NOTE
Recommended to increase dose of Lisinopril 20 mg to 1 5 tab  daily  Start checking BP at home and call with BP log next week  Check BMP next week ( order in chart)

## 2020-03-26 NOTE — ASSESSMENT & PLAN NOTE
Patient reports improvement in leg edema since last month  Continue Furosemide 20 mg daily  Follow a low-sodium diet

## 2020-05-18 DIAGNOSIS — J45.20 MILD INTERMITTENT ASTHMA WITHOUT COMPLICATION: ICD-10-CM

## 2020-08-06 DIAGNOSIS — E11.65 TYPE 2 DIABETES MELLITUS WITH HYPERGLYCEMIA, WITHOUT LONG-TERM CURRENT USE OF INSULIN (HCC): ICD-10-CM

## 2020-08-26 DIAGNOSIS — E78.2 MIXED HYPERLIPIDEMIA: ICD-10-CM

## 2020-08-26 RX ORDER — ATORVASTATIN CALCIUM 10 MG/1
TABLET, FILM COATED ORAL
Qty: 30 TABLET | Refills: 5 | Status: SHIPPED | OUTPATIENT
Start: 2020-08-26 | End: 2021-03-04

## 2020-09-30 DIAGNOSIS — I10 ESSENTIAL HYPERTENSION: ICD-10-CM

## 2020-09-30 DIAGNOSIS — E11.65 TYPE 2 DIABETES MELLITUS WITH HYPERGLYCEMIA, WITHOUT LONG-TERM CURRENT USE OF INSULIN (HCC): ICD-10-CM

## 2020-09-30 RX ORDER — LISINOPRIL 30 MG/1
TABLET ORAL
Qty: 30 TABLET | Refills: 5 | Status: SHIPPED | OUTPATIENT
Start: 2020-09-30 | End: 2021-04-01 | Stop reason: SDUPTHER

## 2020-09-30 RX ORDER — SITAGLIPTIN 100 MG/1
TABLET, FILM COATED ORAL
Qty: 30 TABLET | Refills: 6 | Status: SHIPPED | OUTPATIENT
Start: 2020-09-30 | End: 2021-05-03

## 2020-11-04 LAB
LEFT EYE DIABETIC RETINOPATHY: NORMAL
RIGHT EYE DIABETIC RETINOPATHY: NORMAL

## 2021-01-15 DIAGNOSIS — I10 ESSENTIAL HYPERTENSION: ICD-10-CM

## 2021-01-15 DIAGNOSIS — Z12.5 SCREENING FOR PROSTATE CANCER: ICD-10-CM

## 2021-01-15 DIAGNOSIS — E11.65 TYPE 2 DIABETES MELLITUS WITH HYPERGLYCEMIA, WITHOUT LONG-TERM CURRENT USE OF INSULIN (HCC): Primary | ICD-10-CM

## 2021-01-15 DIAGNOSIS — E55.9 VITAMIN D DEFICIENCY: ICD-10-CM

## 2021-01-15 DIAGNOSIS — E78.2 MIXED HYPERLIPIDEMIA: ICD-10-CM

## 2021-01-15 DIAGNOSIS — R60.0 EDEMA OF BOTH FEET: ICD-10-CM

## 2021-01-15 RX ORDER — FUROSEMIDE 20 MG/1
TABLET ORAL
Qty: 30 TABLET | Refills: 5 | Status: SHIPPED | OUTPATIENT
Start: 2021-01-15 | End: 2021-06-29

## 2021-01-16 ENCOUNTER — TELEPHONE (OUTPATIENT)
Dept: FAMILY MEDICINE CLINIC | Facility: CLINIC | Age: 56
End: 2021-01-16

## 2021-01-16 NOTE — TELEPHONE ENCOUNTER
----- Message from Telma Rock MD sent at 1/15/2021  4:35 PM EST -----  Please call patient  I refilled prescription for Lasix 20 mg daily  P  atient was last seen March 2020  Recommend to schedule follow-up visit next month, check labs prior to visit  Order placed in chart

## 2021-03-04 DIAGNOSIS — E78.2 MIXED HYPERLIPIDEMIA: ICD-10-CM

## 2021-03-04 RX ORDER — ATORVASTATIN CALCIUM 10 MG/1
TABLET, FILM COATED ORAL
Qty: 30 TABLET | Refills: 5 | Status: SHIPPED | OUTPATIENT
Start: 2021-03-04 | End: 2021-09-10

## 2021-03-04 NOTE — TELEPHONE ENCOUNTER
Made 1st call LMOM to make appointment   Reminded him to call the office to schedule the appointment

## 2021-04-01 DIAGNOSIS — I10 ESSENTIAL HYPERTENSION: ICD-10-CM

## 2021-04-01 RX ORDER — LISINOPRIL 30 MG/1
TABLET ORAL
Qty: 30 TABLET | Refills: 5 | Status: SHIPPED | OUTPATIENT
Start: 2021-04-01 | End: 2021-09-26 | Stop reason: HOSPADM

## 2021-05-03 DIAGNOSIS — E11.65 TYPE 2 DIABETES MELLITUS WITH HYPERGLYCEMIA, WITHOUT LONG-TERM CURRENT USE OF INSULIN (HCC): ICD-10-CM

## 2021-05-03 RX ORDER — SITAGLIPTIN 100 MG/1
TABLET, FILM COATED ORAL
Qty: 30 TABLET | Refills: 1 | Status: SHIPPED | OUTPATIENT
Start: 2021-05-03 | End: 2021-07-06

## 2021-05-14 ENCOUNTER — OFFICE VISIT (OUTPATIENT)
Dept: FAMILY MEDICINE CLINIC | Facility: CLINIC | Age: 56
End: 2021-05-14
Payer: COMMERCIAL

## 2021-05-14 VITALS
OXYGEN SATURATION: 97 % | DIASTOLIC BLOOD PRESSURE: 82 MMHG | HEIGHT: 69 IN | BODY MASS INDEX: 46.65 KG/M2 | TEMPERATURE: 99.1 F | HEART RATE: 70 BPM | SYSTOLIC BLOOD PRESSURE: 136 MMHG | WEIGHT: 315 LBS | RESPIRATION RATE: 16 BRPM

## 2021-05-14 DIAGNOSIS — E66.01 MORBID OBESITY (HCC): ICD-10-CM

## 2021-05-14 DIAGNOSIS — Z00.00 WELL ADULT EXAM: ICD-10-CM

## 2021-05-14 DIAGNOSIS — G47.33 OSA (OBSTRUCTIVE SLEEP APNEA): ICD-10-CM

## 2021-05-14 DIAGNOSIS — R60.0 BILATERAL LEG EDEMA: ICD-10-CM

## 2021-05-14 DIAGNOSIS — I51.9 DECREASED LEFT VENTRICULAR SYSTOLIC FUNCTION: ICD-10-CM

## 2021-05-14 DIAGNOSIS — J45.20 MILD INTERMITTENT ASTHMA WITHOUT COMPLICATION: ICD-10-CM

## 2021-05-14 DIAGNOSIS — E78.2 MIXED HYPERLIPIDEMIA: ICD-10-CM

## 2021-05-14 DIAGNOSIS — I10 ESSENTIAL HYPERTENSION: Primary | ICD-10-CM

## 2021-05-14 DIAGNOSIS — Z12.11 SCREENING FOR COLON CANCER: ICD-10-CM

## 2021-05-14 DIAGNOSIS — E11.65 TYPE 2 DIABETES MELLITUS WITH HYPERGLYCEMIA, WITHOUT LONG-TERM CURRENT USE OF INSULIN (HCC): ICD-10-CM

## 2021-05-14 LAB — SL AMB POCT HEMOGLOBIN AIC: 7.4 (ref ?–6.5)

## 2021-05-14 PROCEDURE — 99214 OFFICE O/P EST MOD 30 MIN: CPT | Performed by: FAMILY MEDICINE

## 2021-05-14 PROCEDURE — 99396 PREV VISIT EST AGE 40-64: CPT | Performed by: FAMILY MEDICINE

## 2021-05-14 PROCEDURE — 3008F BODY MASS INDEX DOCD: CPT | Performed by: FAMILY MEDICINE

## 2021-05-14 PROCEDURE — 3725F SCREEN DEPRESSION PERFORMED: CPT | Performed by: FAMILY MEDICINE

## 2021-05-14 PROCEDURE — 83036 HEMOGLOBIN GLYCOSYLATED A1C: CPT | Performed by: FAMILY MEDICINE

## 2021-05-14 PROCEDURE — 3051F HG A1C>EQUAL 7.0%<8.0%: CPT | Performed by: FAMILY MEDICINE

## 2021-05-14 NOTE — ASSESSMENT & PLAN NOTE
Echocardiogram done in March 2020 showed EF 40-45%  There were no regional wall motion abnormalities  No significant heart valve abnormalities  Rule out cardiomyopathy  Recommended to schedule appointment with cardiology

## 2021-05-14 NOTE — PROGRESS NOTES
Assessment/Plan:    Well adult exam  Recommended to follow a well-balanced diet, regular exercise, work on weight reduction  Discussed COVID vaccination  Our staff will help patient to schedule COVID vaccine  Patient is reluctant to schedule colonoscopy due to undergoing treatment for low back problem  Will order Cologuard  Essential hypertension  Continue Lisinopril 30 mg daily  Follow low-sodium diet  Check labs  Type 2 diabetes mellitus with hyperglycemia (HCC)    Lab Results   Component Value Date    HGBA1C 7 4 (A) 05/14/2021     Goal for Hemoglobin A1C < 7 0  Continue Januvia 100 mg tablet daily  Restart Metformin 500 mg 1 tablets twice daily with meals  Check eye exam by ophthalmologist annually  Bilateral leg edema  Leg edema improved  Take Lasix 20 mg daily PRN  Mixed hyperlipidemia  Continue Atorvastatin 10 mg daily  Check CMP, lipid panel  Morbid obesity ([address] )  Patient lost 20 lb since last year  Encouraged to work weight reduction  VICKY (obstructive sleep apnea)  Compliant with CPAP  Mild intermittent asthma without complication  Symptoms are stable  No recent asthma exacerbations  Decreased left ventricular systolic function  Echocardiogram done in March 2020 showed EF 40-45%  There were no regional wall motion abnormalities  No significant heart valve abnormalities  Rule out cardiomyopathy  Recommended to schedule appointment with cardiology  HM: recommended to schedule COVID vaccination  Patient would like to postpone colonoscopy due to current back problem  Will order Cologuard  Schedule follow-up office visit 3 months  Diagnoses and all orders for this visit:    Essential hypertension  -     CBC and differential; Future  -     Comprehensive metabolic panel; Future  -     Cancel: Microalbumin / creatinine urine ratio  -     TSH, 3rd generation with Free T4 reflex;  Future  -     Ambulatory referral to Cardiology; Future  -     Microalbumin / creatinine urine ratio    Type 2 diabetes mellitus with hyperglycemia, without long-term current use of insulin ([facility])  -     Comprehensive metabolic panel; Future  -     POCT hemoglobin A1c    Mixed hyperlipidemia  -     Comprehensive metabolic panel; Future  -     Lipid panel; Future    Morbid obesity ([address] )  -     Comprehensive metabolic panel; Future    Bilateral leg edema  -     Ambulatory referral to Cardiology; Future    VICKY (obstructive sleep apnea)    Mild intermittent asthma without complication    Screening for colon cancer  -     Cologuard; Future    Decreased left ventricular systolic function  -     Ambulatory referral to Cardiology; Future    Well adult exam    Other orders  -     Cancel: Ambulatory referral for colonoscopy; Future          Subjective:      Patient ID: Lana Mcallister is a 50 y o  male  HPI     Patient presents for annual follow-up visit for chronic medical conditions  He was last seen in March 2020  PMHx: HTN, Type 2 DM, Hyperlipidemia, BL LE edema, Morbid obesity, VICKY, psoriasis, asthma  Reviewed current medications  No recent blood work  HTN - patient takes Lisinopril 20 mg daily  Takes Furosemide 20 mg daily PRN for leg edema  Denies chest pain, shortness of breath, dizziness  Echocardiogram done in March 2020 showed EF 40-45%  There were no regional wall motion abnormalities  No significant heart valve abnormalities  Patient was recommended to schedule appointment with cardiology last year  He states that he called cardiology for appointment but nobody called him back  Type 2 DM - patient takes Januvia 100 mg daily in the morning  He did not get a refill for Metformin 500 mg 1 tablet twice daily  Blood sugar at home ranges  180's - 200  Denies tingling, numbness in feet  Patient states that he had eye exam done 6 months ago  Patient lost 20 lb since last year      He tries to eat healthy  VICKY - using CPAP regularly  Hyperlipidemia -on Atorvastatin 10 mg daily  Patient injured his back at work in December 2020  Currently on [employer]  [facility], had 3 epidural steroid injections  Currently undergoing physical therapy twice per week  No prior colonoscopy  The following portions of the patient's history were reviewed and updated as appropriate: allergies, current medications, past medical history, past social history, past surgical history and problem list     Review of Systems   Constitutional: Negative for activity change, appetite change, chills, fatigue and fever  HENT: Negative for congestion, hearing loss, nosebleeds, sore throat, tinnitus and trouble swallowing  Eyes: Negative  Respiratory: Negative for cough, chest tightness, shortness of breath and wheezing  Cardiovascular: Positive for leg swelling  Negative for chest pain and palpitations  Gastrointestinal: Negative for abdominal pain, blood in stool, constipation, diarrhea, nausea and vomiting  Genitourinary: Negative for difficulty urinating, dysuria, flank pain, frequency and hematuria  Musculoskeletal: Positive for back pain  Negative for gait problem, joint swelling and neck pain  Skin: Negative for rash  Neurological: Negative for dizziness, syncope, numbness and headaches  Hematological: Negative  Psychiatric/Behavioral: Negative for dysphoric mood and sleep disturbance  The patient is not nervous/anxious  Objective:      /82 (BP Location: Left arm, Patient Position: Sitting, Cuff Size: Large)   Pulse 70   Temp 99 1 °F (37 3 °C) (Tympanic)   Resp 16   Ht 5' 9" (1 753 m)   Wt (!) 163 kg (360 lb)   SpO2 97%   BMI 53 16 kg/m²          Physical Exam  Vitals signs and nursing note reviewed  Constitutional:       Appearance: Normal appearance  Comments: Morbidly obese   HENT:      Head: Normocephalic and atraumatic  Right Ear: Tympanic membrane normal       Left Ear: Tympanic membrane normal    Eyes:      Conjunctiva/sclera: Conjunctivae normal       Pupils: Pupils are equal, round, and reactive to light  Neck:      Musculoskeletal: Normal range of motion and neck supple  Vascular: No carotid bruit  Cardiovascular:      Rate and Rhythm: Normal rate and regular rhythm  Heart sounds: No murmur  Comments: Trace pretibial BL LE edema  Pulmonary:      Effort: Pulmonary effort is normal       Breath sounds: Normal breath sounds  Abdominal:      General: Bowel sounds are normal  There is no distension  Palpations: Abdomen is soft  Tenderness: There is no abdominal tenderness  Musculoskeletal:      Comments: Low back : decreased ROM with flexion, extension  Skin:     General: Skin is warm and dry  Comments: Erythematous scaly patch on right knee   Neurological:      General: No focal deficit present  Mental Status: He is alert  Motor: No weakness        Gait: Gait normal    Psychiatric:         Mood and Affect: Mood normal

## 2021-05-14 NOTE — PROGRESS NOTES
Chief Complaint   Patient presents with    Physical Exam     Annual Physical    Follow-up     Health Maintenance   Topic Date Due    Pneumococcal Vaccine: Pediatrics (0 to 5 Years) and At-Risk Patients (6 to 59 Years) (1 of 1 - PPSV23) Never done    COVID-19 Vaccine (1) Never done    HIV Screening  Never done    Annual Physical  Never done    Colorectal Cancer Screening  Never done    BMI: Followup Plan  04/19/2020    Diabetic Foot Exam  02/19/2021    Influenza Vaccine (Season Ended) 09/01/2021    HEMOGLOBIN A1C  11/14/2021    Depression Screening PHQ  05/14/2022    BMI: Adult  05/14/2022    DM Eye Exam  11/04/2022    DTaP,Tdap,and Td Vaccines (2 - Td) 06/09/2028    Hepatitis C Screening  Completed    HIB Vaccine  Aged Out    Hepatitis B Vaccine  Aged Out    IPV Vaccine  Aged Out    Hepatitis A Vaccine  Aged Out    Meningococcal ACWY Vaccine  Aged Out    HPV Vaccine  Aged Out         BMI Counseling: Body mass index is 53 16 kg/m²  The BMI is above normal  Nutrition recommendations include decreasing portion sizes, encouraging healthy choices of fruits and vegetables, decreasing fast food intake, consuming healthier snacks, limiting drinks that contain sugar, moderation in carbohydrate intake, increasing intake of lean protein, reducing intake of saturated and trans fat and reducing intake of cholesterol  Exercise recommendations include exercising 3-5 times per week  No pharmacotherapy was ordered  Assessment/Plan:    Well adult exam  Recommended to follow a well-balanced diet, regular exercise, work on weight reduction  Discussed COVID vaccination  Our staff will help patient to schedule COVID vaccine  Patient is reluctant to schedule colonoscopy due to undergoing treatment for low back problem  Will order Cologuard               Diagnoses and all orders for this visit:    Essential hypertension  -     CBC and differential; Future  -     Comprehensive metabolic panel; Future  -     Cancel: Microalbumin / creatinine urine ratio  -     TSH, 3rd generation with Free T4 reflex; Future  -     Ambulatory referral to Cardiology; Future  -     Microalbumin / creatinine urine ratio    Type 2 diabetes mellitus with hyperglycemia, without long-term current use of insulin (HCC)  -     Comprehensive metabolic panel; Future  -     POCT hemoglobin A1c    Mixed hyperlipidemia  -     Comprehensive metabolic panel; Future  -     Lipid panel; Future    Morbid obesity ([address] )  -     Comprehensive metabolic panel; Future    Bilateral leg edema  -     Ambulatory referral to Cardiology; Future    VICKY (obstructive sleep apnea)    Mild intermittent asthma without complication    Screening for colon cancer  -     Cologuard; Future    Decreased left ventricular systolic function  -     Ambulatory referral to Cardiology; Future    Other orders  -     Cancel: Ambulatory referral for colonoscopy; Future        Your house  Subjective:      Patient ID: David Petersen is a 50 y o  male  HPI     Patient presents for annual physical exam and follow-up of chronic medical conditions  Patient was last seen in March 2020  PMHx: HTN, Type 2 DM, Hyperlipidemia, BL LE edema, Morbid obesity, VICKY, psoriasis  Reviewed current medications  No recent blood work  He injured his back at work, currently on [employer]  He has been followed by [facility] orthopedic surgeon, had 3 epidural steroid injections and going for physical therapy twice per week  C/o low-back pain radiating to right LE  Reports some improvement in symptoms after 3 rd epidural steroid injection  Denies tobacco use  Lost 20 lb since last year  No prior colonoscopy  Patient did not schedule Covid vaccination yet  Tdap done in 6/2108       The following portions of the patient's history were reviewed and updated as appropriate: allergies, past family history, past medical history, past social history, past surgical history and problem list you  Review of Systems   Constitutional: Negative for activity change, appetite change, chills, fatigue and fever  HENT: Negative for congestion, ear pain, sore throat, tinnitus and trouble swallowing  Eyes: Negative for pain, discharge, redness, itching and visual disturbance  Respiratory: Negative for cough, chest tightness, shortness of breath and wheezing  Cardiovascular: Positive for leg swelling  Negative for chest pain and palpitations  Gastrointestinal: Negative for abdominal pain, blood in stool, constipation, diarrhea, nausea and vomiting  Genitourinary: Negative for difficulty urinating, dysuria, flank pain, frequency and hematuria  Musculoskeletal: Positive for back pain  Negative for gait problem, joint swelling and neck pain  Skin: Negative for rash  Neurological: Negative for dizziness, numbness and headaches  Hematological: Negative  Psychiatric/Behavioral: Negative for dysphoric mood and sleep disturbance  The patient is not nervous/anxious  Objective:      /82 (BP Location: Left arm, Patient Position: Sitting, Cuff Size: Large)   Pulse 70   Temp 99 1 °F (37 3 °C) (Tympanic)   Resp 16   Ht 5' 9" (1 753 m)   Wt (!) 163 kg (360 lb)   SpO2 97%   BMI 53 16 kg/m²          Physical Exam  Vitals signs and nursing note reviewed  Constitutional:       Appearance: Normal appearance  Comments: Morbidly obese   HENT:      Head: Normocephalic and atraumatic  Right Ear: Tympanic membrane normal       Left Ear: Tympanic membrane normal    Eyes:      Pupils: Pupils are equal, round, and reactive to light  Neck:      Musculoskeletal: Normal range of motion and neck supple  Vascular: No carotid bruit  Cardiovascular:      Rate and Rhythm: Normal rate and regular rhythm  Pulses: no weak pulses          Dorsalis pedis pulses are 2+ on the right side and 2+ on the left side  Heart sounds: No murmur        Comments: Trace pretibial BL LE edema  Pulmonary:      Effort: Pulmonary effort is normal       Breath sounds: Normal breath sounds  Abdominal:      General: There is no distension  Palpations: Abdomen is soft  Tenderness: There is no abdominal tenderness  Musculoskeletal:      Comments: Low back : decreased ROM with flexion, extension  Feet:      Right foot:      Skin integrity: No ulcer, skin breakdown, erythema, warmth, callus or dry skin  Left foot:      Skin integrity: No ulcer, skin breakdown, erythema, warmth, callus or dry skin  Skin:     General: Skin is warm and dry  Comments: Erythematous scaly patch on right knee  Neurological:      General: No focal deficit present  Mental Status: He is alert and oriented to person, place, and time  Motor: No weakness  Gait: Gait normal    Psychiatric:         Mood and Affect: Mood normal          Patient's shoes and socks removed  Right Foot/Ankle   Right Foot Inspection  Skin Exam: skin normal and skin intact no dry skin, no warmth, no callus, no erythema, no maceration, no abnormal color, no pre-ulcer, no ulcer and no callus                          Toe Exam: no swelling, no tenderness, erythema and  no right toe deformity  Sensory       Monofilament testing: intact  Vascular    The right DP pulse is 2+  Left Foot/Ankle  Left Foot Inspection  Skin Exam: skin normal and skin intactno dry skin, no warmth, no erythema, no maceration, normal color, no pre-ulcer, no ulcer and no callus                         Toe Exam: no swelling, no tenderness, no erythema and no left toe deformity                   Sensory       Monofilament: intact  Vascular    The left DP pulse is 2+  Assign Risk Category:  No deformity present; No loss of protective sensation;  No weak pulses       Risk: 0

## 2021-05-14 NOTE — ASSESSMENT & PLAN NOTE
Lab Results   Component Value Date    HGBA1C 7 4 (A) 05/14/2021     Goal for Hemoglobin A1C < 7 0  Continue Januvia 100 mg tablet daily  Restart Metformin 500 mg 1 tablets twice daily with meals  Check eye exam by ophthalmologist annually

## 2021-05-14 NOTE — ASSESSMENT & PLAN NOTE
Recommended to follow a well-balanced diet, regular exercise, work on weight reduction  Discussed COVID vaccination  Our staff will help patient to schedule COVID vaccine  Patient is reluctant to schedule colonoscopy due to undergoing treatment for low back problem  Will order Cologuard

## 2021-05-15 ENCOUNTER — IMMUNIZATIONS (OUTPATIENT)
Dept: FAMILY MEDICINE CLINIC | Facility: HOSPITAL | Age: 56
End: 2021-05-15

## 2021-05-15 DIAGNOSIS — Z23 ENCOUNTER FOR IMMUNIZATION: Primary | ICD-10-CM

## 2021-05-15 PROCEDURE — 91300 SARS-COV-2 / COVID-19 MRNA VACCINE (PFIZER-BIONTECH) 30 MCG: CPT

## 2021-05-15 PROCEDURE — 0001A SARS-COV-2 / COVID-19 MRNA VACCINE (PFIZER-BIONTECH) 30 MCG: CPT

## 2021-06-05 ENCOUNTER — IMMUNIZATIONS (OUTPATIENT)
Dept: FAMILY MEDICINE CLINIC | Facility: HOSPITAL | Age: 56
End: 2021-06-05

## 2021-06-05 DIAGNOSIS — Z23 ENCOUNTER FOR IMMUNIZATION: Primary | ICD-10-CM

## 2021-06-05 PROCEDURE — 91300 SARS-COV-2 / COVID-19 MRNA VACCINE (PFIZER-BIONTECH) 30 MCG: CPT

## 2021-06-05 PROCEDURE — 0002A SARS-COV-2 / COVID-19 MRNA VACCINE (PFIZER-BIONTECH) 30 MCG: CPT

## 2021-06-28 DIAGNOSIS — R60.0 EDEMA OF BOTH FEET: ICD-10-CM

## 2021-06-29 RX ORDER — FUROSEMIDE 20 MG/1
TABLET ORAL
Qty: 30 TABLET | Refills: 5 | Status: SHIPPED | OUTPATIENT
Start: 2021-06-29 | End: 2021-07-22 | Stop reason: HOSPADM

## 2021-07-06 DIAGNOSIS — E11.65 TYPE 2 DIABETES MELLITUS WITH HYPERGLYCEMIA, WITHOUT LONG-TERM CURRENT USE OF INSULIN (HCC): ICD-10-CM

## 2021-07-06 RX ORDER — SITAGLIPTIN 100 MG/1
TABLET, FILM COATED ORAL
Qty: 30 TABLET | Refills: 1 | Status: SHIPPED | OUTPATIENT
Start: 2021-07-06 | End: 2021-09-10

## 2021-07-15 ENCOUNTER — APPOINTMENT (EMERGENCY)
Dept: RADIOLOGY | Facility: HOSPITAL | Age: 56
End: 2021-07-15
Payer: COMMERCIAL

## 2021-07-15 ENCOUNTER — HOSPITAL ENCOUNTER (EMERGENCY)
Facility: HOSPITAL | Age: 56
Discharge: HOME | End: 2021-07-15
Attending: EMERGENCY MEDICINE | Admitting: EMERGENCY MEDICINE
Payer: COMMERCIAL

## 2021-07-15 VITALS
TEMPERATURE: 98.7 F | SYSTOLIC BLOOD PRESSURE: 173 MMHG | RESPIRATION RATE: 24 BRPM | DIASTOLIC BLOOD PRESSURE: 77 MMHG | HEART RATE: 94 BPM | OXYGEN SATURATION: 95 %

## 2021-07-15 DIAGNOSIS — J45.901 ASTHMA EXACERBATION: Primary | ICD-10-CM

## 2021-07-15 DIAGNOSIS — J45.20 MILD INTERMITTENT ASTHMA WITHOUT COMPLICATION: ICD-10-CM

## 2021-07-15 LAB
ALBUMIN SERPL BCP-MCNC: 3.4 G/DL (ref 3.5–5)
ALP SERPL-CCNC: 106 U/L (ref 46–116)
ALT SERPL W P-5'-P-CCNC: 20 U/L (ref 12–78)
ANION GAP SERPL CALCULATED.3IONS-SCNC: 8 MMOL/L (ref 4–13)
AST SERPL W P-5'-P-CCNC: 19 U/L (ref 5–45)
BASOPHILS # BLD AUTO: 0.09 THOUSANDS/ΜL (ref 0–0.1)
BASOPHILS NFR BLD AUTO: 1 % (ref 0–1)
BILIRUB SERPL-MCNC: 0.63 MG/DL (ref 0.2–1)
BUN SERPL-MCNC: 16 MG/DL (ref 5–25)
CALCIUM ALBUM COR SERPL-MCNC: 9.3 MG/DL (ref 8.3–10.1)
CALCIUM SERPL-MCNC: 8.8 MG/DL (ref 8.3–10.1)
CHLORIDE SERPL-SCNC: 102 MMOL/L (ref 100–108)
CO2 SERPL-SCNC: 27 MMOL/L (ref 21–32)
CREAT SERPL-MCNC: 1.25 MG/DL (ref 0.6–1.3)
EOSINOPHIL # BLD AUTO: 0.77 THOUSAND/ΜL (ref 0–0.61)
EOSINOPHIL NFR BLD AUTO: 11 % (ref 0–6)
ERYTHROCYTE [DISTWIDTH] IN BLOOD BY AUTOMATED COUNT: 11.9 % (ref 11.6–15.1)
GFR SERPL CREATININE-BSD FRML MDRD: 65 ML/MIN/1.73SQ M
GLUCOSE SERPL-MCNC: 169 MG/DL (ref 65–140)
HCT VFR BLD AUTO: 44 % (ref 36.5–49.3)
HGB BLD-MCNC: 14.7 G/DL (ref 12–17)
IMM GRANULOCYTES # BLD AUTO: 0.06 THOUSAND/UL (ref 0–0.2)
IMM GRANULOCYTES NFR BLD AUTO: 1 % (ref 0–2)
LYMPHOCYTES # BLD AUTO: 1.07 THOUSANDS/ΜL (ref 0.6–4.47)
LYMPHOCYTES NFR BLD AUTO: 15 % (ref 14–44)
MCH RBC QN AUTO: 31.6 PG (ref 26.8–34.3)
MCHC RBC AUTO-ENTMCNC: 33.4 G/DL (ref 31.4–37.4)
MCV RBC AUTO: 95 FL (ref 82–98)
MONOCYTES # BLD AUTO: 0.64 THOUSAND/ΜL (ref 0.17–1.22)
MONOCYTES NFR BLD AUTO: 9 % (ref 4–12)
NEUTROPHILS # BLD AUTO: 4.56 THOUSANDS/ΜL (ref 1.85–7.62)
NEUTS SEG NFR BLD AUTO: 63 % (ref 43–75)
NRBC BLD AUTO-RTO: 0 /100 WBCS
PLATELET # BLD AUTO: 273 THOUSANDS/UL (ref 149–390)
PMV BLD AUTO: 10.2 FL (ref 8.9–12.7)
POTASSIUM SERPL-SCNC: 3.5 MMOL/L (ref 3.5–5.3)
PROT SERPL-MCNC: 7.6 G/DL (ref 6.4–8.2)
RBC # BLD AUTO: 4.65 MILLION/UL (ref 3.88–5.62)
SODIUM SERPL-SCNC: 137 MMOL/L (ref 136–145)
TROPONIN I SERPL-MCNC: <0.02 NG/ML
WBC # BLD AUTO: 7.19 THOUSAND/UL (ref 4.31–10.16)

## 2021-07-15 PROCEDURE — 85025 COMPLETE CBC W/AUTO DIFF WBC: CPT | Performed by: EMERGENCY MEDICINE

## 2021-07-15 PROCEDURE — 36415 COLL VENOUS BLD VENIPUNCTURE: CPT

## 2021-07-15 PROCEDURE — 71045 X-RAY EXAM CHEST 1 VIEW: CPT

## 2021-07-15 PROCEDURE — 80053 COMPREHEN METABOLIC PANEL: CPT | Performed by: EMERGENCY MEDICINE

## 2021-07-15 PROCEDURE — 93005 ELECTROCARDIOGRAM TRACING: CPT

## 2021-07-15 PROCEDURE — 84484 ASSAY OF TROPONIN QUANT: CPT | Performed by: EMERGENCY MEDICINE

## 2021-07-15 PROCEDURE — 99285 EMERGENCY DEPT VISIT HI MDM: CPT

## 2021-07-15 PROCEDURE — 99285 EMERGENCY DEPT VISIT HI MDM: CPT | Performed by: EMERGENCY MEDICINE

## 2021-07-15 PROCEDURE — 94760 N-INVAS EAR/PLS OXIMETRY 1: CPT | Performed by: SOCIAL WORKER

## 2021-07-15 PROCEDURE — 96375 TX/PRO/DX INJ NEW DRUG ADDON: CPT

## 2021-07-15 PROCEDURE — 96365 THER/PROPH/DIAG IV INF INIT: CPT

## 2021-07-15 PROCEDURE — 94644 CONT INHLJ TX 1ST HOUR: CPT | Performed by: SOCIAL WORKER

## 2021-07-15 RX ORDER — DEXAMETHASONE SODIUM PHOSPHATE 4 MG/ML
10 INJECTION, SOLUTION INTRA-ARTICULAR; INTRALESIONAL; INTRAMUSCULAR; INTRAVENOUS; SOFT TISSUE ONCE
Status: COMPLETED | OUTPATIENT
Start: 2021-07-15 | End: 2021-07-15

## 2021-07-15 RX ORDER — ALBUTEROL SULFATE 90 UG/1
2 AEROSOL, METERED RESPIRATORY (INHALATION) EVERY 4 HOURS PRN
Qty: 18 G | Refills: 3 | Status: SHIPPED | OUTPATIENT
Start: 2021-07-15 | End: 2021-11-22

## 2021-07-15 RX ORDER — MAGNESIUM SULFATE HEPTAHYDRATE 40 MG/ML
2 INJECTION, SOLUTION INTRAVENOUS ONCE
Status: COMPLETED | OUTPATIENT
Start: 2021-07-15 | End: 2021-07-15

## 2021-07-15 RX ORDER — ALBUTEROL SULFATE 90 UG/1
2 AEROSOL, METERED RESPIRATORY (INHALATION) ONCE
Status: COMPLETED | OUTPATIENT
Start: 2021-07-15 | End: 2021-07-15

## 2021-07-15 RX ORDER — SODIUM CHLORIDE FOR INHALATION 0.9 %
3 VIAL, NEBULIZER (ML) INHALATION ONCE
Status: COMPLETED | OUTPATIENT
Start: 2021-07-15 | End: 2021-07-15

## 2021-07-15 RX ORDER — PREDNISONE 20 MG/1
20 TABLET ORAL 2 TIMES DAILY WITH MEALS
Qty: 8 TABLET | Refills: 0 | Status: SHIPPED | OUTPATIENT
Start: 2021-07-15 | End: 2021-07-22 | Stop reason: HOSPADM

## 2021-07-15 RX ADMIN — ALBUTEROL SULFATE 2 PUFF: 90 AEROSOL, METERED RESPIRATORY (INHALATION) at 16:53

## 2021-07-15 RX ADMIN — MAGNESIUM SULFATE HEPTAHYDRATE 2 G: 40 INJECTION, SOLUTION INTRAVENOUS at 15:55

## 2021-07-15 RX ADMIN — DEXAMETHASONE SODIUM PHOSPHATE 10 MG: 4 INJECTION INTRA-ARTICULAR; INTRALESIONAL; INTRAMUSCULAR; INTRAVENOUS; SOFT TISSUE at 16:53

## 2021-07-15 RX ADMIN — ISODIUM CHLORIDE 15 ML: 0.03 SOLUTION RESPIRATORY (INHALATION) at 15:37

## 2021-07-15 RX ADMIN — IPRATROPIUM BROMIDE 1 MG: 0.5 SOLUTION RESPIRATORY (INHALATION) at 15:36

## 2021-07-15 RX ADMIN — ALBUTEROL SULFATE 10 MG: 2.5 SOLUTION RESPIRATORY (INHALATION) at 15:37

## 2021-07-15 NOTE — RESPIRATORY THERAPY NOTE
resp care      07/15/21 4384   Respiratory Assessment   R Breath Sounds Expiratory wheezes; Inspiratory wheezes   Resp Comments Pt states he is feeling better after heart neb  Going home with albuterol mdi  Pt instructed on spacer use and encouraged to use

## 2021-07-15 NOTE — ED PROVIDER NOTES
History  Chief Complaint   Patient presents with    Shortness of Breath     started with SOB yesterday, hx of asthma, ran out of albuterol, worsening today  wheezing noted  92% on RA, placed on 2L    Chest Pain     started with CP around 1100, aching accross entire chest  324 asa per EMS  61-year-old female with a history of asthma presents with shortness of breath  Patient reports that he started getting short of breath yesterday, but he ran out of his albuterol inhaler  Shortness of breath got worse today and patient decided to come into the ED for evaluation  Patient reports fevers at home as high as 100° F  He has been having a dry cough and congestion for the past few days as well  No sore throat, nausea, vomiting, or diarrhea  Patient also notes that his chest feels tight and he has a dull pain in the center of it  He does not note chest pain getting worse with exertion  He does not know of anything that makes it better or worse  Patient was given aspirin per EMS  He was on 2 L of oxygen on the ride over, but patient is on room air now in satting in the low 90s  Hurt neb was put in as a 1st nursing order  Patient has his heart neb running and is now feeling a little better  Prior to Admission Medications   Prescriptions Last Dose Informant Patient Reported? Taking?    Blood Glucose Monitoring Suppl (ONE TOUCH ULTRA 2) w/Device KIT  Self No No   Sig: Test blood sugar twice daily   Januvia 100 MG tablet   No No   Sig: TAKE 1 TABLET BY MOUTH EVERY DAY   MULTIPLE VITAMINS-CALCIUM PO  Self Yes No   Sig: Take 1 tablet by mouth daily   ONETOUCH DELICA LANCETS 99W MISC  Self No No   Sig: Test blood sugar twice daily   albuterol (ProAir HFA) 90 mcg/act inhaler 7/14/2021 at Unknown time  No Yes   Sig: Inhale 2 puffs every 4 (four) hours as needed for wheezing   atorvastatin (LIPITOR) 10 mg tablet  Self No No   Sig: TAKE 1 TABLET BY MOUTH EVERY DAY   furosemide (LASIX) 20 mg tablet   No No   Sig: TAKE 1 TABLET BY MOUTH ONCE A DAY   glucose blood (ONE TOUCH ULTRA TEST) test strip  Self No No   Sig: Test blood sugar twice daily   lisinopril (ZESTRIL) 30 mg tablet  Self No No   Sig: TAKE 1 TABLET BY MOUTH DAILY   metFORMIN (GLUCOPHAGE) 500 mg tablet Not Taking at Unknown time Self No No   Sig: Take 1 tablet (500 mg total) by mouth 2 (two) times a day After meals   Patient not taking: Reported on 5/14/2021      Facility-Administered Medications: None       Past Medical History:   Diagnosis Date    Acute gastritis without hemorrhage     last assessed 3/24/17    Asthma     Diabetes mellitus ([facility] [address] )     Gastric bypass status for obesity     Hyperlipidemia     Hypertension     Impaired fasting glucose     last assessed 5/10/17    Obesity     Viral gastroenteritis     last assessed 11/4/16       Past Surgical History:   Procedure Laterality Date    CARPAL TUNNEL RELEASE      bilateral    GASTRIC BYPASS  2004    with han en y    HERNIA REPAIR      ventral inscisional    TONSILLECTOMY         Family History   Problem Relation Age of Onset    Stroke Mother     Hypertension Father      I have reviewed and agree with the history as documented  E-Cigarette/Vaping    E-Cigarette Use Never User      E-Cigarette/Vaping Substances     Social History     Tobacco Use    Smoking status: Light Tobacco Smoker     Types: Cigars    Smokeless tobacco: Current User    Tobacco comment: rare   Vaping Use    Vaping Use: Never used   Substance Use Topics    Alcohol use: Yes     Comment: social    Drug use: No        Review of Systems   Constitutional: Positive for fever  Negative for appetite change, chills and fatigue  HENT: Positive for congestion and rhinorrhea  Negative for sore throat  Eyes: Negative  Respiratory: Positive for cough, chest tightness, shortness of breath and wheezing  Cardiovascular: Positive for chest pain  Negative for palpitations     Gastrointestinal: Negative for abdominal pain, diarrhea, nausea and vomiting  Endocrine: Negative  Genitourinary: Negative for difficulty urinating and hematuria  Musculoskeletal: Negative for arthralgias and myalgias  Skin: Negative for pallor and rash  Allergic/Immunologic: Negative  Neurological: Negative for dizziness, weakness, light-headedness and headaches  Hematological: Negative  Physical Exam  ED Triage Vitals   Temperature Pulse Respirations Blood Pressure SpO2   07/15/21 1508 07/15/21 1507 07/15/21 1507 07/15/21 1507 07/15/21 1504   98 7 °F (37 1 °C) 101 (!) 28 (!) 188/98 93 %      Temp src Heart Rate Source Patient Position - Orthostatic VS BP Location FiO2 (%)   -- -- -- -- --             Pain Score       --                    Orthostatic Vital Signs  Vitals:    07/15/21 1507 07/15/21 1600 07/15/21 1630   BP: (!) 188/98 (!) 173/80 (!) 173/77   Pulse: 101 94 94       Physical Exam  Vitals and nursing note reviewed  Constitutional:       General: He is in acute distress  Appearance: Normal appearance  He is well-developed  He is not ill-appearing  HENT:      Head: Normocephalic and atraumatic  Eyes:      Conjunctiva/sclera: Conjunctivae normal    Neck:      Vascular: No JVD  Cardiovascular:      Rate and Rhythm: Normal rate and regular rhythm  Heart sounds: No murmur heard  Pulmonary:      Effort: Tachypnea and respiratory distress present  Breath sounds: Wheezing present  No decreased breath sounds, rhonchi or rales  Musculoskeletal:         General: Normal range of motion  Cervical back: Normal range of motion and neck supple  Skin:     General: Skin is warm and dry  Neurological:      General: No focal deficit present  Mental Status: He is alert and oriented to person, place, and time           ED Medications  Medications   albuterol inhalation solution 10 mg (10 mg Nebulization Given 7/15/21 1537)     And   ipratropium (ATROVENT) 0 02 % inhalation solution 1 mg (1 mg Nebulization Given 7/15/21 1536)     And   sodium chloride 0 9 % inhalation solution 3 mL (15 mL Nebulization Given 7/15/21 1537)   magnesium sulfate 2 g/50 mL IVPB (premix) 2 g (0 g Intravenous Stopped 7/15/21 1630)   dexamethasone (DECADRON) injection 10 mg (10 mg Intravenous Given 7/15/21 1653)   albuterol (PROVENTIL HFA,VENTOLIN HFA) inhaler 2 puff (2 puffs Inhalation Given 7/15/21 1653)       Diagnostic Studies  Results Reviewed     Procedure Component Value Units Date/Time    Troponin I [[id number]]  (Normal) Collected: 07/15/21 1515    Lab Status: Final result Specimen: Blood from Arm, Left Updated: 07/15/21 1548     Troponin I <0 02 ng/mL     Comprehensive metabolic panel [[id number]]  (Abnormal) Collected: 07/15/21 1515    Lab Status: Final result Specimen: Blood from Arm, Left Updated: 07/15/21 1547     Sodium 137 mmol/L      Potassium 3 5 mmol/L      Chloride 102 mmol/L      CO2 27 mmol/L      ANION GAP 8 mmol/L      BUN 16 mg/dL      Creatinine 1 25 mg/dL      Glucose 169 mg/dL      Calcium 8 8 mg/dL      Corrected Calcium 9 3 mg/dL      AST 19 U/L      ALT 20 U/L      Alkaline Phosphatase 106 U/L      Total Protein 7 6 g/dL      Albumin 3 4 g/dL      Total Bilirubin 0 63 mg/dL      eGFR 65 ml/min/1 73sq m     Narrative:      Damari guidelines for Chronic Kidney Disease (CKD):     Stage 1 with normal or high GFR (GFR > 90 mL/min/1 73 square meters)    Stage 2 Mild CKD (GFR = 60-89 mL/min/1 73 square meters)    Stage 3A Moderate CKD (GFR = 45-59 mL/min/1 73 square meters)    Stage 3B Moderate CKD (GFR = 30-44 mL/min/1 73 square meters)    Stage 4 Severe CKD (GFR = 15-29 mL/min/1 73 square meters)    Stage 5 End Stage CKD (GFR <15 mL/min/1 73 square meters)  Note: GFR calculation is accurate only with a steady state creatinine    CBC and differential [[id number]]  (Abnormal) Collected: 07/15/21 1515    Lab Status: Final result Specimen: Blood from Arm, Left Updated: 07/15/21 1527     WBC 7 19 Thousand/uL      RBC 4 65 Million/uL      Hemoglobin 14 7 g/dL      Hematocrit 44 0 %      MCV 95 fL      MCH 31 6 pg      MCHC 33 4 g/dL      RDW 11 9 %      MPV 10 2 fL      Platelets 833 Thousands/uL      nRBC 0 /100 WBCs      Neutrophils Relative 63 %      Immat GRANS % 1 %      Lymphocytes Relative 15 %      Monocytes Relative 9 %      Eosinophils Relative 11 %      Basophils Relative 1 %      Neutrophils Absolute 4 56 Thousands/µL      Immature Grans Absolute 0 06 Thousand/uL      Lymphocytes Absolute 1 07 Thousands/µL      Monocytes Absolute 0 64 Thousand/µL      Eosinophils Absolute 0 77 Thousand/µL      Basophils Absolute 0 09 Thousands/µL                  XR chest 1 view portable   Final Result by Atilio Duke MD (07/15 1640)      No acute cardiopulmonary disease  Workstation performed: [id number]               Procedures  Procedures      ED Course  ED Course as of Jul 15 2013   Thu Jul 15, 2021   1650 The heart rate is 101, which is slightly tachycardic  The rhythm is regular  The axis is normal  The P waves are normal and the OK interval is normal  The QRS height is normal and width is normal  The ST segments are not elevated or depressed  The T waves are normal  The QT segment is normal  This ecg shows sinus rhythm  ECG 12 lead             HEART Risk Score      Most Recent Value   Heart Score Risk Calculator   History  0 Filed at: 07/15/2021 2004   ECG  0 Filed at: 07/15/2021 2004   Age  1 Filed at: 07/15/2021 2004   Risk Factors  2 Filed at: 07/15/2021 2004   Troponin  0 Filed at: 07/15/2021 2004   HEART Score  3 Filed at: 07/15/2021 2004                      SBIRT 22yo+      Most Recent Value   SBIRT (25 yo +)   In order to provide better care to our patients, we are screening all of our patients for alcohol and drug use  Would it be okay to ask you these screening questions? Yes Filed at: 07/15/2021 1615   Initial Alcohol Screen: US AUDIT-C    1   How often do you have a drink containing alcohol?  0 Filed at: 07/15/2021 1615   Audit-C Score  0 Filed at: 07/15/2021 1615                [facility]  Number of Diagnoses or Management Options  Asthma exacerbation: established and improving  Diagnosis management comments: 61-year-old male with history of asthma presents with shortness of breath and wheezing  Patient is also complaining of chest tightness/pain  Will do a cardiac workup to rule out other etiologies of his chest pain  Patient has a Heart neb it is starting to feel better  Will add on magnesium and steroids  Will re-evaluate  Amount and/or Complexity of Data Reviewed  Clinical lab tests: ordered and reviewed  Tests in the radiology section of CPT®: ordered and reviewed  Review and summarize past medical records: yes  Discuss the patient with other providers: yes    Risk of Complications, Morbidity, and/or Mortality  Presenting problems: moderate  Diagnostic procedures: moderate  Management options: moderate    Patient Progress  Patient progress: improved    Patient felt better after heart neb, steroids, and magnesium  He was no longer wheezing, and respiratory distress, or short of breath  Patient appeared well  He was given an albuterol inhaler and discharged with recommendations to follow-up with his primary care physician  Return precautions given  Disposition  Final diagnoses:   Asthma exacerbation     Time reflects when diagnosis was documented in both MDM as applicable and the Disposition within this note     Time User Action Codes Description Comment    7/15/2021  5:15 PM Candy Chicas Add [X29 [id number]] Asthma exacerbation       ED Disposition     ED Disposition Condition Date/Time Comment    Discharge Good Thu Jul 15, 2021  5:15 PM Melvina Bence discharge to home/self care              Follow-up Information     Follow up With Specialties Details Why Contact Info    Kaushal Perdomo MD [facility] [address] [facility]  [phone] Discharge Medication List as of 7/15/2021  5:16 PM      START taking these medications    Details   predniSONE 20 mg tablet Take 1 tablet (20 mg total) by mouth 2 (two) times a day with meals for 4 days, Starting u 7/15/2021, Until Mon 7/19/2021, Normal         CONTINUE these medications which have NOT CHANGED    Details   albuterol (ProAir HFA) 90 mcg/act inhaler Inhale 2 puffs every 4 (four) hours as needed for wheezing, Starting Thu 7/15/2021, Normal      atorvastatin (LIPITOR) 10 mg tablet TAKE 1 TABLET BY MOUTH EVERY DAY, Normal      Blood Glucose Monitoring Suppl (ONE TOUCH ULTRA 2) w/Device KIT Test blood sugar twice daily, Normal      furosemide (LASIX) 20 mg tablet TAKE 1 TABLET BY MOUTH ONCE A DAY, Normal      glucose blood (ONE TOUCH ULTRA TEST) test strip Test blood sugar twice daily, Normal      Januvia 100 MG tablet TAKE 1 TABLET BY MOUTH EVERY DAY, Normal      lisinopril (ZESTRIL) 30 mg tablet TAKE 1 TABLET BY MOUTH DAILY, Normal      metFORMIN (GLUCOPHAGE) 500 mg tablet Take 1 tablet (500 mg total) by mouth 2 (two) times a day After meals, Starting Thu 8/6/2020, Normal      MULTIPLE VITAMINS-CALCIUM PO Take 1 tablet by mouth daily, Starting Tue 2/23/2016, Historical Med      ONETOUCH DELICA LANCETS 43Q MISC Test blood sugar twice daily, Normal           No discharge procedures on file  PDMP Review     None           ED Provider  Attending physically available and evaluated Eleazar Amezcua  JUANIS managed the patient along with the ED Attending      Electronically Signed by         Sara Benitez DO  07/15/21 2013

## 2021-07-15 NOTE — DISCHARGE INSTRUCTIONS
You have been seen for asthma  You should return to the ED if you develop shortness of breath, chest pain, or other worsening symptoms  Follow up with your PCP  Use inhaler as needed  Take steroids for the next 4 days

## 2021-07-15 NOTE — ED ATTENDING ATTESTATION
Final Diagnosis:  1  Asthma exacerbation      ED Course as of Jul 15 2155   u Jul 15, 2021   1546 WBC: 7 19   1546 Hemoglobin: 14 7   1546 Platelet Count: 382       I, Mimi Hernandez MD, saw and evaluated the patient  All available labs and X-rays were ordered by me or the resident and have been reviewed by myself  I discussed the patient with the resident / non-physician and agree with the resident's / non-physician practitioner's findings and plan as documented in the resident's / non-physician practicitioner's note, except where noted  At this point, I agree with the current assessment done in the ED  I was present during key portions of all procedures performed unless otherwise stated  Chief Complaint   Patient presents with    Shortness of Breath     started with SOB yesterday, hx of asthma, ran out of albuterol, worsening today  wheezing noted  92% on RA, placed on 2L    Chest Pain     started with CP around 1100, aching accross entire chest  324 asa per EMS  This is a 49 y o  male presenting for evaluation of shortness of breath since yesterday  The work patient works as a   He has been out of his medications include albuterol for few days now  History of asthma  When he came in he was feeling very short of breath with some chest pain across the entire anterior chest   EMS was called due to the chest pain shortness of breath  Upon arrival he was noted to have significant wheezing so a First Nurse neb treatment was started with significant improvement in symptoms  He is not hypoxic but did have lower O2 saturations  He was given 324 of aspirin by EMS  Patient feels better now than before  No fevers chills nausea vomiting  No falls or injuries  No recent travel      PMH:   has a past medical history of Acute gastritis without hemorrhage, Asthma, Diabetes mellitus ([address] ), Gastric bypass status for obesity, Hyperlipidemia, Hypertension, Impaired fasting glucose, Obesity, and Viral gastroenteritis  PSH:   has a past surgical history that includes Gastric bypass (2004); Hernia repair; Carpal tunnel release; and Tonsillectomy  Social:  Social History     Substance and Sexual Activity   Alcohol Use Yes    Comment: social     Social History     Tobacco Use   Smoking Status Light Tobacco Smoker    Types: Cigars   Smokeless Tobacco Current User   Tobacco Comment    rare     Social History     Substance and Sexual Activity   Drug Use No     PE:  Vitals:    07/15/21 1508 07/15/21 1538 07/15/21 1600 07/15/21 1630   BP:   (!) 173/80 (!) 173/77   Pulse:   94 94   Resp:   16 (!) 24   Temp: 98 7 °F (37 1 °C)      SpO2:  93% 96% 95%   General: VSS, NAD, awake, alert  Well-nourished, well-developed  Appears stated age  Speaking normally in full sentences  Head: Normocephalic, atraumatic, nontender  Eyes: PERRL, EOM-I  No diplopia  No hyphema  No subconjunctival hemorrhages  Symmetrical lids  ENT: Atraumatic external nose and ears  MMM  No malocclusion  No stridor  Normal phonation  No drooling  Normal swallowing  Neck: Symmetric, trachea midline  No JVD  CV: RRR  +S1/S2  No murmurs or gallops  Peripheral pulses +2 throughout  No chest wall tenderness  Lungs:   Mildly labored No retractions  Diffuse wheezing  lungs sounds equal bilateral    No tachypnea  Abd: +BS, soft, NT/ND    MSK:   FROM   Back:   No rashes  Skin: Dry, intact  Neuro: AAOx3, GCS 15, CN II-XII grossly intact  Motor grossly intact  Psychiatric/Behavioral: Appropriate mood and affect   Exam: deferred  A:  - Dyspnea / wheezing  - CP  P:  - Cardiac workup  - likely bronchitis / asthma exacerbation  - supportive care  - likely DC with refill os medications     - 13 point ROS was performed and all are normal unless stated in the history above  - Nursing note reviewed  Vitals reviewed     - Orders placed by myself and/or advanced practitioner / resident     - Previous chart was reviewed  - No language barrier    - History obtained from patient  - There are no limitations to the history obtained  - Critical care time: 32 minutes  - Critical care time was exclusive of seperately bilable procedures and treating other patients as well as teaching time  - Critical care was necessary to treat or prevent imminent or life-threatening deterioration of the following condition: asthma exacerbation  - Critical care time was spent personally by me on the following activities as well as the above as per the ED course and rest of chart: blood draw for specimens, obtaining history from patient / surrogate, developement of a treatment plan, discussions with consultants, evaluation of patient's response to the treatment, examination of the patient, ordering/performing treatements and interventions, re-evaluation of the patient's condition, review of old charts, ordering/reviewing laboratory studies, ordering/reviewing of radiographic studies    Code Status: No Order  Advance Directive and Living Will:      Power of :    POLST:      Medications   albuterol inhalation solution 10 mg (10 mg Nebulization Given 7/15/21 1537)     And   ipratropium (ATROVENT) 0 02 % inhalation solution 1 mg (1 mg Nebulization Given 7/15/21 1536)     And   sodium chloride 0 9 % inhalation solution 3 mL (15 mL Nebulization Given 7/15/21 1537)   magnesium sulfate 2 g/50 mL IVPB (premix) 2 g (0 g Intravenous Stopped 7/15/21 1630)   dexamethasone (DECADRON) injection 10 mg (10 mg Intravenous Given 7/15/21 1653)   albuterol (PROVENTIL HFA,VENTOLIN HFA) inhaler 2 puff (2 puffs Inhalation Given 7/15/21 1653)     XR chest 1 view portable   Final Result      No acute cardiopulmonary disease                    Workstation performed: [id number]           Orders Placed This Encounter   Procedures    XR chest 1 view portable    CBC and differential    Comprehensive metabolic panel    Troponin I    Continuous cardiac monitoring    Continuous pulse oximetry    ECG 12 lead     Labs Reviewed   CBC AND DIFFERENTIAL - Abnormal       Result Value Ref Range Status    WBC 7 19  4 31 - 10 16 Thousand/uL Final    RBC 4 65  3 88 - 5 62 Million/uL Final    Hemoglobin 14 7  12 0 - 17 0 g/dL Final    Hematocrit 44 0  36 5 - 49 3 % Final    MCV 95  82 - 98 fL Final    MCH 31 6  26 8 - 34 3 pg Final    MCHC 33 4  31 4 - 37 4 g/dL Final    RDW 11 9  11 6 - 15 1 % Final    MPV 10 2  8 9 - 12 7 fL Final    Platelets 298  717 - 390 Thousands/uL Final    nRBC 0  /100 WBCs Final    Neutrophils Relative 63  43 - 75 % Final    Immat GRANS % 1  0 - 2 % Final    Lymphocytes Relative 15  14 - 44 % Final    Monocytes Relative 9  4 - 12 % Final    Eosinophils Relative 11 (*) 0 - 6 % Final    Basophils Relative 1  0 - 1 % Final    Neutrophils Absolute 4 56  1 85 - 7 62 Thousands/µL Final    Immature Grans Absolute 0 06  0 00 - 0 20 Thousand/uL Final    Lymphocytes Absolute 1 07  0 60 - 4 47 Thousands/µL Final    Monocytes Absolute 0 64  0 17 - 1 22 Thousand/µL Final    Eosinophils Absolute 0 77 (*) 0 00 - 0 61 Thousand/µL Final    Basophils Absolute 0 09  0 00 - 0 10 Thousands/µL Final   COMPREHENSIVE METABOLIC PANEL - Abnormal    Sodium 137  136 - 145 mmol/L Final    Potassium 3 5  3 5 - 5 3 mmol/L Final    Chloride 102  100 - 108 mmol/L Final    CO2 27  21 - 32 mmol/L Final    ANION GAP 8  4 - 13 mmol/L Final    BUN 16  5 - 25 mg/dL Final    Creatinine 1 25  0 60 - 1 30 mg/dL Final    Comment: Standardized to IDMS reference method    Glucose 169 (*) 65 - 140 mg/dL Final    Comment: If the patient is fasting, the ADA then defines impaired fasting glucose as > 100 mg/dL and diabetes as > or equal to 123 mg/dL  Specimen collection should occur prior to Sulfasalazine administration due to the potential for falsely depressed results  Specimen collection should occur prior to Sulfapyridine administration due to the potential for falsely elevated results      Calcium 8 8  8 3 - 10 1 mg/dL Final    Corrected Calcium 9 3  8 3 - 10 1 mg/dL Final    AST 19  5 - 45 U/L Final    Comment: Specimen collection should occur prior to Sulfasalazine administration due to the potential for falsely depressed results  ALT 20  12 - 78 U/L Final    Comment: Specimen collection should occur prior to Sulfasalazine and/or Sulfapyridine administration due to the potential for falsely depressed results  Alkaline Phosphatase 106  46 - 116 U/L Final    Total Protein 7 6  6 4 - 8 2 g/dL Final    Albumin 3 4 (*) 3 5 - 5 0 g/dL Final    Total Bilirubin 0 63  0 20 - 1 00 mg/dL Final    Comment: Use of this assay is not recommended for patients undergoing treatment with eltrombopag due to the potential for falsely elevated results  eGFR 65  ml/min/1 73sq m Final    Narrative:     [place] guidelines for Chronic Kidney Disease (CKD):     Stage 1 with normal or high GFR (GFR > 90 mL/min/1 73 square meters)    Stage 2 Mild CKD (GFR = 60-89 mL/min/1 73 square meters)    Stage 3A Moderate CKD (GFR = 45-59 mL/min/1 73 square meters)    Stage 3B Moderate CKD (GFR = 30-44 mL/min/1 73 square meters)    Stage 4 Severe CKD (GFR = 15-29 mL/min/1 73 square meters)    Stage 5 End Stage CKD (GFR <15 mL/min/1 73 square meters)  Note: GFR calculation is accurate only with a steady state creatinine   TROPONIN I - Normal    Troponin I <0 02  <=0 04 ng/mL Final    Comment: Siemens Chemistry analyzer 99% cutoff is > 0 04 ng/mL in network labs     o cTnI 99% cutoff is useful only when applied to patients in the clinical setting of myocardial ischemia   o cTnI 99% cutoff should be interpreted in the context of clinical history, ECG findings and possibly cardiac imaging to establish correct diagnosis  o cTnI 99% cutoff may be suggestive but clearly not indicative of a coronary event without the clinical setting of myocardial ischemia       LIGHT BLUE TOP     Time reflects when diagnosis was documented in both MDM as applicable and the Disposition within this note       Time User Action Codes Description Comment    7/15/2021  5:15 PM Kevon Lopez Add [[id number]] Asthma exacerbation           ED Disposition       ED Disposition Condition Date/Time Comment    Discharge Good Thu Jul 15, 2021  5:15 PM Comfort Lively discharge to home/self care  Follow-up Information       Follow up With Specialties Details Why Contact Neeru Brumfield MD Family Medicine   [facility] [address] [facility] [place]  [phone]            Discharge Medication List as of 7/15/2021  5:16 PM        START taking these medications    Details   predniSONE 20 mg tablet Take 1 tablet (20 mg total) by mouth 2 (two) times a day with meals for 4 days, Starting Thu 7/15/2021, Until Mon 7/19/2021, Normal           CONTINUE these medications which have NOT CHANGED    Details   albuterol (ProAir HFA) 90 mcg/act inhaler Inhale 2 puffs every 4 (four) hours as needed for wheezing, Starting Thu 7/15/2021, Normal      atorvastatin (LIPITOR) 10 mg tablet TAKE 1 TABLET BY MOUTH EVERY DAY, Normal      Blood Glucose Monitoring Suppl (ONE TOUCH ULTRA 2) w/Device KIT Test blood sugar twice daily, Normal      furosemide (LASIX) 20 mg tablet TAKE 1 TABLET BY MOUTH ONCE A DAY, Normal      glucose blood (ONE TOUCH ULTRA TEST) test strip Test blood sugar twice daily, Normal      Januvia 100 MG tablet TAKE 1 TABLET BY MOUTH EVERY DAY, Normal      lisinopril (ZESTRIL) 30 mg tablet TAKE 1 TABLET BY MOUTH DAILY, Normal      metFORMIN (GLUCOPHAGE) 500 mg tablet Take 1 tablet (500 mg total) by mouth 2 (two) times a day After meals, Starting Thu 8/6/2020, Normal      MULTIPLE VITAMINS-CALCIUM PO Take 1 tablet by mouth daily, Starting Tue 2/23/2016, Historical Med      ONETOUCH DELICA LANCETS 92T MISC Test blood sugar twice daily, Normal           No discharge procedures on file  Prior to Admission Medications   Prescriptions Last Dose Informant Patient Reported? Taking? Blood Glucose Monitoring Suppl (ONE TOUCH ULTRA 2) w/Device KIT  Self No No   Sig: Test blood sugar twice daily   Januvia 100 MG tablet   No No   Sig: TAKE 1 TABLET BY MOUTH EVERY DAY   MULTIPLE VITAMINS-CALCIUM PO  Self Yes No   Sig: Take 1 tablet by mouth daily   ONETOUCH DELICA LANCETS 56C MISC  Self No No   Sig: Test blood sugar twice daily   albuterol (ProAir HFA) 90 mcg/act inhaler 7/14/2021 at Unknown time  No Yes   Sig: Inhale 2 puffs every 4 (four) hours as needed for wheezing   atorvastatin (LIPITOR) 10 mg tablet  Self No No   Sig: TAKE 1 TABLET BY MOUTH EVERY DAY   furosemide (LASIX) 20 mg tablet   No No   Sig: TAKE 1 TABLET BY MOUTH ONCE A DAY   glucose blood (ONE TOUCH ULTRA TEST) test strip  Self No No   Sig: Test blood sugar twice daily   lisinopril (ZESTRIL) 30 mg tablet  Self No No   Sig: TAKE 1 TABLET BY MOUTH DAILY   metFORMIN (GLUCOPHAGE) 500 mg tablet Not Taking at Unknown time Self No No   Sig: Take 1 tablet (500 mg total) by mouth 2 (two) times a day After meals   Patient not taking: Reported on 5/14/2021      Facility-Administered Medications: None       Portions of the record may have been created with voice recognition software  Occasional wrong word or "sound a like" substitutions may have occurred due to the inherent limitations of voice recognition software  Read the chart carefully and recognize, using context, where substitutions have occurred      Electronically signed by:  Lang Mortimer

## 2021-07-16 LAB
ATRIAL RATE: 101 BPM
P AXIS: 68 DEGREES
PR INTERVAL: 134 MS
QRS AXIS: 73 DEGREES
QRSD INTERVAL: 70 MS
QT INTERVAL: 324 MS
QTC INTERVAL: 420 MS
T WAVE AXIS: 58 DEGREES
VENTRICULAR RATE: 101 BPM

## 2021-07-16 PROCEDURE — 93010 ELECTROCARDIOGRAM REPORT: CPT | Performed by: INTERNAL MEDICINE

## 2021-07-18 ENCOUNTER — APPOINTMENT (EMERGENCY)
Dept: RADIOLOGY | Facility: HOSPITAL | Age: 56
DRG: 202 | End: 2021-07-18
Payer: COMMERCIAL

## 2021-07-18 ENCOUNTER — HOSPITAL ENCOUNTER (INPATIENT)
Facility: HOSPITAL | Age: 56
LOS: 4 days | Discharge: HOME | DRG: 202 | End: 2021-07-22
Attending: EMERGENCY MEDICINE | Admitting: INTERNAL MEDICINE
Payer: COMMERCIAL

## 2021-07-18 DIAGNOSIS — J45.20 MILD INTERMITTENT ASTHMA WITHOUT COMPLICATION: ICD-10-CM

## 2021-07-18 DIAGNOSIS — I42.9 CARDIOMYOPATHY (HCC): ICD-10-CM

## 2021-07-18 DIAGNOSIS — R07.9 CHEST PAIN: ICD-10-CM

## 2021-07-18 DIAGNOSIS — I51.9 DECREASED LEFT VENTRICULAR SYSTOLIC FUNCTION: Primary | ICD-10-CM

## 2021-07-18 DIAGNOSIS — J45.41 MODERATE PERSISTENT ASTHMA WITH ACUTE EXACERBATION: ICD-10-CM

## 2021-07-18 DIAGNOSIS — R06.02 SHORTNESS OF BREATH: ICD-10-CM

## 2021-07-18 DIAGNOSIS — I50.9 CHF (CONGESTIVE HEART FAILURE) (HCC): ICD-10-CM

## 2021-07-18 LAB
ALBUMIN SERPL BCP-MCNC: 2.9 G/DL (ref 3.5–5)
ALP SERPL-CCNC: 89 U/L (ref 46–116)
ALT SERPL W P-5'-P-CCNC: 20 U/L (ref 12–78)
ANION GAP SERPL CALCULATED.3IONS-SCNC: 10 MMOL/L (ref 4–13)
AST SERPL W P-5'-P-CCNC: 16 U/L (ref 5–45)
ATRIAL RATE: 89 BPM
BACTERIA UR QL AUTO: NORMAL /HPF
BASE EX.OXY STD BLDV CALC-SCNC: 92.9 % (ref 60–80)
BASE EXCESS BLDV CALC-SCNC: 0.9 MMOL/L
BASOPHILS # BLD AUTO: 0.03 THOUSANDS/ΜL (ref 0–0.1)
BASOPHILS NFR BLD AUTO: 0 % (ref 0–1)
BILIRUB SERPL-MCNC: 0.41 MG/DL (ref 0.2–1)
BILIRUB UR QL STRIP: NEGATIVE
BUN SERPL-MCNC: 19 MG/DL (ref 5–25)
CALCIUM ALBUM COR SERPL-MCNC: 9.4 MG/DL (ref 8.3–10.1)
CALCIUM SERPL-MCNC: 8.5 MG/DL (ref 8.3–10.1)
CHLORIDE SERPL-SCNC: 101 MMOL/L (ref 100–108)
CLARITY UR: CLEAR
CO2 SERPL-SCNC: 23 MMOL/L (ref 21–32)
COLOR UR: YELLOW
CREAT SERPL-MCNC: 1.02 MG/DL (ref 0.6–1.3)
EOSINOPHIL # BLD AUTO: 0 THOUSAND/ΜL (ref 0–0.61)
EOSINOPHIL NFR BLD AUTO: 0 % (ref 0–6)
ERYTHROCYTE [DISTWIDTH] IN BLOOD BY AUTOMATED COUNT: 11.9 % (ref 11.6–15.1)
EST. AVERAGE GLUCOSE BLD GHB EST-MCNC: 157 MG/DL
GFR SERPL CREATININE-BSD FRML MDRD: 83 ML/MIN/1.73SQ M
GLUCOSE SERPL-MCNC: 381 MG/DL (ref 65–140)
GLUCOSE UR STRIP-MCNC: ABNORMAL MG/DL
HBA1C MFR BLD: 7.1 %
HCO3 BLDV-SCNC: 23.7 MMOL/L (ref 24–30)
HCT VFR BLD AUTO: 39 % (ref 36.5–49.3)
HGB BLD-MCNC: 13.5 G/DL (ref 12–17)
HGB UR QL STRIP.AUTO: NEGATIVE
IMM GRANULOCYTES # BLD AUTO: 0.08 THOUSAND/UL (ref 0–0.2)
IMM GRANULOCYTES NFR BLD AUTO: 1 % (ref 0–2)
KETONES UR STRIP-MCNC: ABNORMAL MG/DL
LEUKOCYTE ESTERASE UR QL STRIP: ABNORMAL
LYMPHOCYTES # BLD AUTO: 0.7 THOUSANDS/ΜL (ref 0.6–4.47)
LYMPHOCYTES NFR BLD AUTO: 7 % (ref 14–44)
MCH RBC QN AUTO: 32.6 PG (ref 26.8–34.3)
MCHC RBC AUTO-ENTMCNC: 34.6 G/DL (ref 31.4–37.4)
MCV RBC AUTO: 94 FL (ref 82–98)
MONOCYTES # BLD AUTO: 0.36 THOUSAND/ΜL (ref 0.17–1.22)
MONOCYTES NFR BLD AUTO: 4 % (ref 4–12)
MUCOUS THREADS UR QL AUTO: NORMAL
NEUTROPHILS # BLD AUTO: 8.23 THOUSANDS/ΜL (ref 1.85–7.62)
NEUTS SEG NFR BLD AUTO: 88 % (ref 43–75)
NITRITE UR QL STRIP: NEGATIVE
NON-SQ EPI CELLS URNS QL MICRO: NORMAL /HPF
NRBC BLD AUTO-RTO: 0 /100 WBCS
NT-PROBNP SERPL-MCNC: 605 PG/ML
O2 CT BLDV-SCNC: 18.3 ML/DL
P AXIS: 65 DEGREES
PCO2 BLDV: 32.5 MM HG (ref 42–50)
PH BLDV: 7.48 [PH] (ref 7.3–7.4)
PH UR STRIP.AUTO: 5.5 [PH]
PLATELET # BLD AUTO: 252 THOUSANDS/UL (ref 149–390)
PMV BLD AUTO: 10.1 FL (ref 8.9–12.7)
PO2 BLDV: 66.5 MM HG (ref 35–45)
POTASSIUM SERPL-SCNC: 3.9 MMOL/L (ref 3.5–5.3)
PR INTERVAL: 144 MS
PROT SERPL-MCNC: 6.7 G/DL (ref 6.4–8.2)
PROT UR STRIP-MCNC: NEGATIVE MG/DL
QRS AXIS: 65 DEGREES
QRSD INTERVAL: 76 MS
QT INTERVAL: 362 MS
QTC INTERVAL: 422 MS
RBC # BLD AUTO: 4.14 MILLION/UL (ref 3.88–5.62)
RBC #/AREA URNS AUTO: NORMAL /HPF
SODIUM SERPL-SCNC: 134 MMOL/L (ref 136–145)
SP GR UR STRIP.AUTO: 1.03 (ref 1–1.03)
T WAVE AXIS: 70 DEGREES
TROPONIN I SERPL-MCNC: <0.02 NG/ML
TROPONIN I SERPL-MCNC: <0.02 NG/ML
UROBILINOGEN UR QL STRIP.AUTO: 0.2 E.U./DL
VENTRICULAR RATE: 82 BPM
WBC # BLD AUTO: 9.4 THOUSAND/UL (ref 4.31–10.16)
WBC #/AREA URNS AUTO: NORMAL /HPF

## 2021-07-18 PROCEDURE — 71046 X-RAY EXAM CHEST 2 VIEWS: CPT

## 2021-07-18 PROCEDURE — 83036 HEMOGLOBIN GLYCOSYLATED A1C: CPT | Performed by: INTERNAL MEDICINE

## 2021-07-18 PROCEDURE — 36415 COLL VENOUS BLD VENIPUNCTURE: CPT | Performed by: EMERGENCY MEDICINE

## 2021-07-18 PROCEDURE — 83880 ASSAY OF NATRIURETIC PEPTIDE: CPT | Performed by: EMERGENCY MEDICINE

## 2021-07-18 PROCEDURE — 93010 ELECTROCARDIOGRAM REPORT: CPT | Performed by: INTERNAL MEDICINE

## 2021-07-18 PROCEDURE — 99223 1ST HOSP IP/OBS HIGH 75: CPT | Performed by: INTERNAL MEDICINE

## 2021-07-18 PROCEDURE — 84484 ASSAY OF TROPONIN QUANT: CPT | Performed by: INTERNAL MEDICINE

## 2021-07-18 PROCEDURE — 81001 URINALYSIS AUTO W/SCOPE: CPT | Performed by: INTERNAL MEDICINE

## 2021-07-18 PROCEDURE — 99285 EMERGENCY DEPT VISIT HI MDM: CPT

## 2021-07-18 PROCEDURE — 82805 BLOOD GASES W/O2 SATURATION: CPT | Performed by: INTERNAL MEDICINE

## 2021-07-18 PROCEDURE — 84484 ASSAY OF TROPONIN QUANT: CPT | Performed by: EMERGENCY MEDICINE

## 2021-07-18 PROCEDURE — 94640 AIRWAY INHALATION TREATMENT: CPT

## 2021-07-18 PROCEDURE — 80053 COMPREHEN METABOLIC PANEL: CPT | Performed by: EMERGENCY MEDICINE

## 2021-07-18 PROCEDURE — 93005 ELECTROCARDIOGRAM TRACING: CPT

## 2021-07-18 PROCEDURE — 85025 COMPLETE CBC W/AUTO DIFF WBC: CPT | Performed by: EMERGENCY MEDICINE

## 2021-07-18 PROCEDURE — 99285 EMERGENCY DEPT VISIT HI MDM: CPT | Performed by: EMERGENCY MEDICINE

## 2021-07-18 RX ORDER — LEVALBUTEROL 1.25 MG/.5ML
1.25 SOLUTION, CONCENTRATE RESPIRATORY (INHALATION)
Status: DISCONTINUED | OUTPATIENT
Start: 2021-07-18 | End: 2021-07-22 | Stop reason: HOSPADM

## 2021-07-18 RX ORDER — MONTELUKAST SODIUM 10 MG/1
10 TABLET ORAL
Status: DISCONTINUED | OUTPATIENT
Start: 2021-07-18 | End: 2021-07-22 | Stop reason: HOSPADM

## 2021-07-18 RX ORDER — FUROSEMIDE 20 MG/1
20 TABLET ORAL DAILY
Status: DISCONTINUED | OUTPATIENT
Start: 2021-07-19 | End: 2021-07-19

## 2021-07-18 RX ORDER — ATORVASTATIN CALCIUM 10 MG/1
10 TABLET, FILM COATED ORAL
Status: DISCONTINUED | OUTPATIENT
Start: 2021-07-19 | End: 2021-07-22 | Stop reason: HOSPADM

## 2021-07-18 RX ORDER — ACETAMINOPHEN 325 MG/1
650 TABLET ORAL EVERY 6 HOURS PRN
Status: DISCONTINUED | OUTPATIENT
Start: 2021-07-18 | End: 2021-07-22 | Stop reason: HOSPADM

## 2021-07-18 RX ORDER — MAGNESIUM HYDROXIDE/ALUMINUM HYDROXICE/SIMETHICONE 120; 1200; 1200 MG/30ML; MG/30ML; MG/30ML
30 SUSPENSION ORAL EVERY 6 HOURS PRN
Status: DISCONTINUED | OUTPATIENT
Start: 2021-07-18 | End: 2021-07-22 | Stop reason: HOSPADM

## 2021-07-18 RX ORDER — ALBUTEROL SULFATE 2.5 MG/3ML
5 SOLUTION RESPIRATORY (INHALATION) ONCE
Status: COMPLETED | OUTPATIENT
Start: 2021-07-18 | End: 2021-07-18

## 2021-07-18 RX ORDER — DOCUSATE SODIUM 100 MG/1
100 CAPSULE, LIQUID FILLED ORAL 2 TIMES DAILY PRN
Status: DISCONTINUED | OUTPATIENT
Start: 2021-07-18 | End: 2021-07-22 | Stop reason: HOSPADM

## 2021-07-18 RX ORDER — METHYLPREDNISOLONE SODIUM SUCCINATE 40 MG/ML
40 INJECTION, POWDER, LYOPHILIZED, FOR SOLUTION INTRAMUSCULAR; INTRAVENOUS EVERY 6 HOURS SCHEDULED
Status: DISCONTINUED | OUTPATIENT
Start: 2021-07-19 | End: 2021-07-20

## 2021-07-18 RX ORDER — GUAIFENESIN 600 MG
600 TABLET, EXTENDED RELEASE 12 HR ORAL 2 TIMES DAILY
Status: DISCONTINUED | OUTPATIENT
Start: 2021-07-18 | End: 2021-07-22 | Stop reason: HOSPADM

## 2021-07-18 RX ORDER — ONDANSETRON 2 MG/ML
4 INJECTION INTRAMUSCULAR; INTRAVENOUS EVERY 6 HOURS PRN
Status: DISCONTINUED | OUTPATIENT
Start: 2021-07-18 | End: 2021-07-22 | Stop reason: HOSPADM

## 2021-07-18 RX ORDER — METHYLPREDNISOLONE SODIUM SUCCINATE 125 MG/2ML
125 INJECTION, POWDER, LYOPHILIZED, FOR SOLUTION INTRAMUSCULAR; INTRAVENOUS ONCE
Status: COMPLETED | OUTPATIENT
Start: 2021-07-18 | End: 2021-07-18

## 2021-07-18 RX ORDER — SODIUM CHLORIDE FOR INHALATION 0.9 %
3 VIAL, NEBULIZER (ML) INHALATION
Status: DISCONTINUED | OUTPATIENT
Start: 2021-07-18 | End: 2021-07-22 | Stop reason: HOSPADM

## 2021-07-18 RX ADMIN — IPRATROPIUM BROMIDE 0.5 MG: 0.5 SOLUTION RESPIRATORY (INHALATION) at 18:16

## 2021-07-18 RX ADMIN — METHYLPREDNISOLONE SODIUM SUCCINATE 125 MG: 125 INJECTION, POWDER, FOR SOLUTION INTRAMUSCULAR; INTRAVENOUS at 20:53

## 2021-07-18 RX ADMIN — ALBUTEROL SULFATE 5 MG: 2.5 SOLUTION RESPIRATORY (INHALATION) at 18:16

## 2021-07-18 NOTE — ED PROVIDER NOTES
History  Chief Complaint   Patient presents with    Chest Pain     Pt reports increased sob and cp since being seen here last thusrday   Shortness of Breath     HPI     62-year-old male with past medical history of diabetes, hypertension, hyperlipidemia, asthma, HF with EF of 40-45% and obesity who presents for evaluation shortness breath and chest pain  Patient states he has been having the symptoms for past 5 days  He was evaluated in the emergency department 3 days ago and had labs performed and was given a breathing treatment and steroid course which provided temporary relief but has since worsened  He states he has been using his inhaler every 3-4 hours and has been taking his prednisone as prescribed without much relief of symptoms  Patient states the chest pain is located in the center of his chest   Chest pain is worse with exertion  States his shortness of breath is worse with exertion and lying flat  Also endorses occasional lightheadedness  Patient states he has had a dry cough over the past 5 days  Denies any fevers, chills, abdominal pain, nausea, vomiting, diarrhea, or diaphoresis  Patient denies any recent weight gain or worsening leg swelling  Patient states he did get his COVID vaccine in June  Patient was supposed to have an appointment with Cardiology last week for to his reduced ejection fraction but has not been able to see them because of his recent illness  Prior to Admission Medications   Prescriptions Last Dose Informant Patient Reported? Taking?    Blood Glucose Monitoring Suppl (ONE TOUCH ULTRA 2) w/Device KIT 7/18/2021 at Unknown time Self No Yes   Sig: Test blood sugar twice daily   Januvia 100 MG tablet 7/18/2021 at Unknown time  No Yes   Sig: TAKE 1 TABLET BY MOUTH EVERY DAY   MULTIPLE VITAMINS-CALCIUM PO 7/18/2021 at Unknown time Self Yes Yes   Sig: Take 1 tablet by mouth daily   ONETOUCH DELICA LANCETS 21W MISC 7/18/2021 at Unknown time Self No Yes   Sig: Test blood sugar twice daily   albuterol (ProAir HFA) 90 mcg/act inhaler 7/18/2021 at Unknown time  No Yes   Sig: Inhale 2 puffs every 4 (four) hours as needed for wheezing   atorvastatin (LIPITOR) 10 mg tablet 7/18/2021 at Unknown time Self No Yes   Sig: TAKE 1 TABLET BY MOUTH EVERY DAY   furosemide (LASIX) 20 mg tablet 7/18/2021 at Unknown time  No Yes   Sig: TAKE 1 TABLET BY MOUTH ONCE A DAY   glucose blood (ONE TOUCH ULTRA TEST) test strip 7/18/2021 at Unknown time Self No Yes   Sig: Test blood sugar twice daily   lisinopril (ZESTRIL) 30 mg tablet 7/18/2021 at Unknown time Self No Yes   Sig: TAKE 1 TABLET BY MOUTH DAILY   metFORMIN (GLUCOPHAGE) 500 mg tablet Not Taking at Unknown time Self No No   Sig: Take 1 tablet (500 mg total) by mouth 2 (two) times a day After meals   Patient not taking: Reported on 5/14/2021   predniSONE 20 mg tablet 7/18/2021 at Unknown time  No Yes   Sig: Take 1 tablet (20 mg total) by mouth 2 (two) times a day with meals for 4 days      Facility-Administered Medications: None       Past Medical History:   Diagnosis Date    Acute gastritis without hemorrhage     last assessed 3/24/17    Asthma     Diabetes mellitus ([facility] [address] )     Gastric bypass status for obesity     Hyperlipidemia     Hypertension     Impaired fasting glucose     last assessed 5/10/17    Obesity     Viral gastroenteritis     last assessed 11/4/16       Past Surgical History:   Procedure Laterality Date    CARPAL TUNNEL RELEASE      bilateral    GASTRIC BYPASS  2004    with han en y    HERNIA REPAIR      ventral inscisional    TONSILLECTOMY         Family History   Problem Relation Age of Onset    Stroke Mother     Hypertension Father      I have reviewed and agree with the history as documented      E-Cigarette/Vaping    E-Cigarette Use Never User      E-Cigarette/Vaping Substances     Social History     Tobacco Use    Smoking status: Light Tobacco Smoker     Types: Cigars    Smokeless tobacco: Current User   Christine Kelly Tobacco comment: rare   Vaping Use    Vaping Use: Never used   Substance Use Topics    Alcohol use: Yes     Comment: social    Drug use: No        Review of Systems   Constitutional: Negative for appetite change, chills, fatigue, fever and unexpected weight change  HENT: Negative for congestion, rhinorrhea and sore throat  Eyes: Negative for visual disturbance  Respiratory: Positive for cough, chest tightness and shortness of breath  Negative for wheezing  Cardiovascular: Positive for chest pain  Negative for palpitations and leg swelling  Gastrointestinal: Negative for abdominal pain, constipation, diarrhea, nausea and vomiting  Genitourinary: Negative for dysuria, frequency, hematuria and urgency  Musculoskeletal: Negative for arthralgias and myalgias  Skin: Negative for color change, pallor and rash  Neurological: Positive for light-headedness  Negative for dizziness, weakness, numbness and headaches  All other systems reviewed and are negative  Physical Exam  ED Triage Vitals   Temperature Pulse Respirations Blood Pressure SpO2   07/18/21 1810 07/18/21 1739 07/18/21 1739 07/18/21 1741 07/18/21 1739   98 °F (36 7 °C) 96 20 (!) 180/89 94 %      Temp Source Heart Rate Source Patient Position - Orthostatic VS BP Location FiO2 (%)   07/18/21 1810 07/18/21 1739 07/18/21 1739 07/18/21 1739 --   Oral Monitor Sitting Right arm       Pain Score       07/18/21 1739       4             Orthostatic Vital Signs  Vitals:    07/19/21 2248 07/20/21 0255 07/20/21 0705 07/20/21 1455   BP: 158/98 154/87 161/98 163/82   Pulse: 88 71 80 89   Patient Position - Orthostatic VS:           Physical Exam  Vitals and nursing note reviewed  Constitutional:       General: He is not in acute distress  Appearance: Normal appearance  He is well-developed and normal weight  He is not ill-appearing, toxic-appearing or diaphoretic  HENT:      Head: Normocephalic and atraumatic        Right Ear: Tympanic membrane normal       Left Ear: Tympanic membrane normal       Nose: Nose normal       Mouth/Throat:      Mouth: Mucous membranes are moist       Pharynx: Oropharynx is clear  Eyes:      Conjunctiva/sclera: Conjunctivae normal       Pupils: Pupils are equal, round, and reactive to light  Cardiovascular:      Rate and Rhythm: Normal rate and regular rhythm  Pulses: Normal pulses  Radial pulses are 2+ on the right side and 2+ on the left side  Posterior tibial pulses are 2+ on the right side and 2+ on the left side  Heart sounds: Normal heart sounds  No murmur heard  No friction rub  No gallop  Pulmonary:      Effort: Pulmonary effort is normal  No respiratory distress  Breath sounds: Wheezing present  No decreased breath sounds, rhonchi or rales  Abdominal:      General: Bowel sounds are normal  There is no distension  Palpations: Abdomen is soft  Tenderness: There is no abdominal tenderness  There is no guarding or rebound  Musculoskeletal:         General: No swelling or tenderness  Cervical back: Neck supple  Right lower leg: No tenderness  No edema  Left lower leg: No tenderness  No edema  Skin:     General: Skin is warm and dry  Coloration: Skin is not pale  Findings: No erythema or rash  Neurological:      General: No focal deficit present  Mental Status: He is alert and oriented to person, place, and time  Cranial Nerves: No cranial nerve deficit  Sensory: No sensory deficit  Motor: No weakness     Psychiatric:         Mood and Affect: Mood normal          Behavior: Behavior normal          ED Medications  Medications   atorvastatin (LIPITOR) tablet 10 mg (10 mg Oral Given 7/20/21 1710)   lisinopril (ZESTRIL) tablet 30 mg (30 mg Oral Given 7/20/21 1818)   multivitamin stress formula tablet 1 tablet (1 tablet Oral Given 7/20/21 0822)   acetaminophen (TYLENOL) tablet 650 mg (has no administration in time range) docusate sodium (COLACE) capsule 100 mg (has no administration in time range)   ondansetron (ZOFRAN) injection 4 mg (has no administration in time range)   aluminum-magnesium hydroxide-simethicone (MYLANTA) oral suspension 30 mL (has no administration in time range)   guaiFENesin (MUCINEX) 12 hr tablet 600 mg (600 mg Oral Given 7/20/21 1710)   montelukast (SINGULAIR) tablet 10 mg (10 mg Oral Given 7/19/21 2155)   levalbuterol (XOPENEX) inhalation solution 1 25 mg (1 25 mg Nebulization Given 7/20/21 1328)     And   sodium chloride 0 9 % inhalation solution 3 mL (3 mL Nebulization Given 7/20/21 1328)   enoxaparin (LOVENOX) subcutaneous injection 40 mg (40 mg Subcutaneous Given 7/20/21 0822)   insulin lispro (HumaLOG) 100 units/mL subcutaneous injection 2-12 Units (2 Units Subcutaneous Given 7/20/21 1615)   insulin lispro (HumaLOG) 100 units/mL subcutaneous injection 1-5 Units (3 Units Subcutaneous Given 7/19/21 2156)   albuterol inhalation solution 2 5 mg (has no administration in time range)   budesonide (PULMICORT) inhalation solution 0 5 mg (0 5 mg Nebulization Given 7/20/21 0801)   insulin glargine (LANTUS) subcutaneous injection 15 Units 0 15 mL (has no administration in time range)   insulin lispro (HumaLOG) 100 units/mL subcutaneous injection 4 Units (4 Units Subcutaneous Given 7/20/21 1615)   furosemide (LASIX) tablet 40 mg (has no administration in time range)   methylPREDNISolone sodium succinate (Solu-MEDROL) injection 40 mg (has no administration in time range)   albuterol inhalation solution 5 mg (5 mg Nebulization Given 7/18/21 1816)   ipratropium (ATROVENT) 0 02 % inhalation solution 0 5 mg (0 5 mg Nebulization Given 7/18/21 1816)   methylPREDNISolone sodium succinate (Solu-MEDROL) injection 125 mg (125 mg Intravenous Given 7/18/21 2053)   furosemide (LASIX) injection 40 mg (40 mg Intravenous Given 7/19/21 1135)       Diagnostic Studies  Results Reviewed     Procedure Component Value Units Date/Time TSH, 3rd generation [[id number]]  (Normal) Collected: 07/19/21 0415    Lab Status: Final result Specimen: Blood from Arm, Right Updated: 07/19/21 0454     TSH 3RD GENERATON 1 100 uIU/mL     Narrative:      Patients undergoing fluorescein dye angiography may retain small amounts of fluorescein in the body for 48-72 hours post procedure  Samples containing fluorescein can produce falsely depressed TSH values  If the patient had this procedure,a specimen should be resubmitted post fluorescein clearance        Basic metabolic panel [[id number]]  (Abnormal) Collected: 07/19/21 0415    Lab Status: Final result Specimen: Blood from Arm, Right Updated: 07/19/21 0454     Sodium 133 mmol/L      Potassium 4 4 mmol/L      Chloride 99 mmol/L      CO2 26 mmol/L      ANION GAP 8 mmol/L      BUN 20 mg/dL      Creatinine 1 14 mg/dL      Glucose 361 mg/dL      Calcium 8 6 mg/dL      eGFR 73 ml/min/1 73sq m     Narrative:      [place] guidelines for Chronic Kidney Disease (CKD):     Stage 1 with normal or high GFR (GFR > 90 mL/min/1 73 square meters)    Stage 2 Mild CKD (GFR = 60-89 mL/min/1 73 square meters)    Stage 3A Moderate CKD (GFR = 45-59 mL/min/1 73 square meters)    Stage 3B Moderate CKD (GFR = 30-44 mL/min/1 73 square meters)    Stage 4 Severe CKD (GFR = 15-29 mL/min/1 73 square meters)    Stage 5 End Stage CKD (GFR <15 mL/min/1 73 square meters)  Note: GFR calculation is accurate only with a steady state creatinine    Phosphorus [[id number]]  (Normal) Collected: 07/19/21 0415    Lab Status: Final result Specimen: Blood from Arm, Right Updated: 07/19/21 0454     Phosphorus 3 6 mg/dL     Magnesium [[id number]]  (Normal) Collected: 07/19/21 0415    Lab Status: Final result Specimen: Blood from Arm, Right Updated: 07/19/21 0454     Magnesium 1 7 mg/dL     CBC and differential [[id number]]  (Abnormal) Collected: 07/19/21 0415    Lab Status: Final result Specimen: Blood from Arm, Right Updated: 07/19/21 0452     WBC 5 96 Thousand/uL      RBC 4 16 Million/uL      Hemoglobin 13 3 g/dL      Hematocrit 38 7 %      MCV 93 fL      MCH 32 0 pg      MCHC 34 4 g/dL      RDW 11 8 %      MPV 9 9 fL      Platelets 237 Thousands/uL      nRBC 0 /100 WBCs      Neutrophils Relative 93 %      Immat GRANS % 1 %      Lymphocytes Relative 5 %      Monocytes Relative 1 %      Eosinophils Relative 0 %      Basophils Relative 0 %      Neutrophils Absolute 5 47 Thousands/µL      Immature Grans Absolute 0 07 Thousand/uL      Lymphocytes Absolute 0 31 Thousands/µL      Monocytes Absolute 0 08 Thousand/µL      Eosinophils Absolute 0 01 Thousand/µL      Basophils Absolute 0 02 Thousands/µL     Narrative: This is an appended report  These results have been appended to a previously verified report      Troponin I [[id number]]  (Normal) Collected: 07/19/21 0415    Lab Status: Final result Specimen: Blood from Arm, Right Updated: 07/19/21 0448     Troponin I <0 02 ng/mL     Troponin I [[id number]]  (Normal) Collected: 07/19/21 0059    Lab Status: Final result Specimen: Blood from Arm, Right Updated: 07/19/21 0125     Troponin I <0 02 ng/mL     D-dimer, quantitative [[id number]]  (Normal) Collected: 07/19/21 0059    Lab Status: Final result Specimen: Blood from Arm, Right Updated: 07/19/21 0121     D-Dimer, Quant 0 32 ug/ml FEU     Fingerstick Glucose (POCT) [[id number]]  (Abnormal) Collected: 07/19/21 0032    Lab Status: Final result Updated: 07/19/21 0039     POC Glucose 312 mg/dl     Urine Microscopic [[id number]]  (Normal) Collected: 07/18/21 2318    Lab Status: Final result Specimen: Urine, Clean Catch Updated: 07/18/21 2352     RBC, UA None Seen /hpf      WBC, UA None Seen /hpf      Epithelial Cells None Seen /hpf      Bacteria, UA Occasional /hpf      MUCUS THREADS None Seen    Troponin I [[id number]]  (Normal) Collected: 07/18/21 2312    Lab Status: Final result Specimen: Blood from Arm, Right Updated: 07/18/21 2345     Troponin I <0 02 ng/mL     Hemoglobin A1c w/EAG Estimation (Orders if not completed within the last 90 days) [[id number]]  (Abnormal) Collected: 07/18/21 2312    Lab Status: Final result Specimen: Blood from Arm, Right Updated: 07/18/21 2342     Hemoglobin A1C 7 1 %       mg/dl     UA (URINE) with reflex to Scope [[id number]]  (Abnormal) Collected: 07/18/21 2318    Lab Status: Final result Specimen: Urine, Clean Catch Updated: 07/18/21 2328     Color, UA Yellow     Clarity, UA Clear     Specific Gravity, UA 1 026     pH, UA 5 5     Leukocytes, UA Elevated glucose may cause decreased leukocyte values   See urine microscopic for [facility] result/     Nitrite, UA Negative     Protein, UA Negative mg/dl      Glucose, UA >=1000 (1%) mg/dl      Ketones, UA Trace mg/dl      Urobilinogen, UA 0 2 E U /dl      Bilirubin, UA Negative     Blood, UA Negative    Blood gas, venous [[id number]]  (Abnormal) Collected: 07/18/21 2312    Lab Status: Final result Specimen: Blood from Arm, Right Updated: 07/18/21 2318     pH, Jordy 7 481     pCO2, Jordy 32 5 mm Hg      pO2, Jordy 66 5 mm Hg      HCO3, Jordy 23 7 mmol/L      Base Excess, Jordy 0 9 mmol/L      O2 Content, Jordy 18 3 ml/dL      O2 HGB, VENOUS 92 9 %     NT-BNP PRO [[id number]]  (Abnormal) Collected: 07/18/21 1815    Lab Status: Final result Specimen: Blood from Arm, Right Updated: 07/18/21 1855     NT-proBNP 605 pg/mL     Comprehensive metabolic panel [[id number]]  (Abnormal) Collected: 07/18/21 1815    Lab Status: Final result Specimen: Blood from Arm, Right Updated: 07/18/21 1854     Sodium 134 mmol/L      Potassium 3 9 mmol/L      Chloride 101 mmol/L      CO2 23 mmol/L      ANION GAP 10 mmol/L      BUN 19 mg/dL      Creatinine 1 02 mg/dL      Glucose 381 mg/dL      Calcium 8 5 mg/dL      Corrected Calcium 9 4 mg/dL      AST 16 U/L      ALT 20 U/L      Alkaline Phosphatase 89 U/L      Total Protein 6 7 g/dL      Albumin 2 9 g/dL      Total Bilirubin 0 41 mg/dL      eGFR 83 ml/min/1 73sq m Narrative:      [employer] guidelines for Chronic Kidney Disease (CKD):     Stage 1 with normal or high GFR (GFR > 90 mL/min/1 73 square meters)    Stage 2 Mild CKD (GFR = 60-89 mL/min/1 73 square meters)    Stage 3A Moderate CKD (GFR = 45-59 mL/min/1 73 square meters)    Stage 3B Moderate CKD (GFR = 30-44 mL/min/1 73 square meters)    Stage 4 Severe CKD (GFR = 15-29 mL/min/1 73 square meters)    Stage 5 End Stage CKD (GFR <15 mL/min/1 73 square meters)  Note: GFR calculation is accurate only with a steady state creatinine    Troponin I [[id number]]  (Normal) Collected: 07/18/21 1815    Lab Status: Final result Specimen: Blood from Arm, Right Updated: 07/18/21 1852     Troponin I <0 02 ng/mL     CBC and differential [[id number]]  (Abnormal) Collected: 07/18/21 1815    Lab Status: Final result Specimen: Blood from Arm, Right Updated: 07/18/21 1828     WBC 9 40 Thousand/uL      RBC 4 14 Million/uL      Hemoglobin 13 5 g/dL      Hematocrit 39 0 %      MCV 94 fL      MCH 32 6 pg      MCHC 34 6 g/dL      RDW 11 9 %      MPV 10 1 fL      Platelets 276 Thousands/uL      nRBC 0 /100 WBCs      Neutrophils Relative 88 %      Immat GRANS % 1 %      Lymphocytes Relative 7 %      Monocytes Relative 4 %      Eosinophils Relative 0 %      Basophils Relative 0 %      Neutrophils Absolute 8 23 Thousands/µL      Immature Grans Absolute 0 08 Thousand/uL      Lymphocytes Absolute 0 70 Thousands/µL      Monocytes Absolute 0 36 Thousand/µL      Eosinophils Absolute 0 00 Thousand/µL      Basophils Absolute 0 03 Thousands/µL                  XR chest 2 views   Final Result by Ana Gooden DO (07/19 5327)      No acute cardiopulmonary disease                    Workstation performed: [id number]               Procedures  Procedures      ED Course                             SBIRT 22yo+      Most Recent Value   SBIRT (24 yo +)   In order to provide better care to our patients, we are screening all of our patients for alcohol and drug use  Would it be okay to ask you these screening questions? Unable to answer at this time Filed at: 07/18/2021 9806                MDM       72-year-old male with past medical history of diabetes, hypertension, hyperlipidemia, asthma, HF with EF of 40-45% and obesity who presents for evaluation shortness breath and chest pain  Differential diagnosis includes: ACS, asthma exacerbation, pneumonia, CHF  Low suspicion for PE  Will check CBC, CMP, troponin, BNP, EKG and CXR  Will give duoneb  Reviewed labs, trop negative  BNP slightly elevated to 605  CMP and CBC without marked abnormalities  CXR negative for acute cardiopulmonary disease  Reassessed patient, minimal improvement with duoneb  Will plan for admission for asthma exacerbation vs CHF/fluid overload  Patient is agreeable for admission  Discussed with SLIM for admission  Disposition  Final diagnoses:   Shortness of breath   Chest pain   CHF (congestive heart failure) ([facility] )     Time reflects when diagnosis was documented in both MDM as applicable and the Disposition within this note     Time User Action Codes Description Comment    7/18/2021  8:37 PM Beth Lowrye 429 [I51 9] Decreased left ventricular systolic function     2/69/7382  8:41 PM Leonardo West Add [J45 41] Moderate persistent asthma with acute exacerbation     7/18/2021  8:42 PM Marylee Rand Add [R06 02] Shortness of breath     7/18/2021  8:42 PM Marylee Rand Add [R07 9] Chest pain     7/18/2021  8:43 PM Marylee Rand Add [I50 9] CHF (congestive heart failure) ([facility] )     7/19/2021  1:40 PM Pita Steele Add [J45 20] Mild intermittent asthma without complication       ED Disposition     ED Disposition Condition Date/Time Comment    Admit Stable Sun Jul 18, 2021  8:42 PM Case was discussed with ADEN and the patient's admission status was agreed to be Admission Status: observation status to the service of Dr Savana Meredith          Follow-up Information    None Current Discharge Medication List      CONTINUE these medications which have NOT CHANGED    Details   albuterol (ProAir HFA) 90 mcg/act inhaler Inhale 2 puffs every 4 (four) hours as needed for wheezing  Qty: 18 g, Refills: 3    Comments: Substitution to a formulary equivalent within the same pharmaceutical class is authorized  Associated Diagnoses: Mild intermittent asthma without complication      atorvastatin (LIPITOR) 10 mg tablet TAKE 1 TABLET BY MOUTH EVERY DAY  Qty: 30 tablet, Refills: 5    Associated Diagnoses: Mixed hyperlipidemia      Blood Glucose Monitoring Suppl (ONE TOUCH ULTRA 2) w/Device KIT Test blood sugar twice daily  Qty: 1 each, Refills: 0    Associated Diagnoses: Type 2 diabetes mellitus with hyperglycemia, without long-term current use of insulin ([facility])      furosemide (LASIX) 20 mg tablet TAKE 1 TABLET BY MOUTH ONCE A DAY  Qty: 30 tablet, Refills: 5    Comments: DX Code Needed  PT REQUEST A REFILL    Associated Diagnoses: Edema of both feet      glucose blood (ONE TOUCH ULTRA TEST) test strip Test blood sugar twice daily  Qty: 200 each, Refills: 3    Associated Diagnoses: Type 2 diabetes mellitus with hyperglycemia, without long-term current use of insulin ([facility])      Januvia 100 MG tablet TAKE 1 TABLET BY MOUTH EVERY DAY  Qty: 30 tablet, Refills: 1    Associated Diagnoses: Type 2 diabetes mellitus with hyperglycemia, without long-term current use of insulin ([facility])      lisinopril (ZESTRIL) 30 mg tablet TAKE 1 TABLET BY MOUTH DAILY  Qty: 30 tablet, Refills: 5    Associated Diagnoses: Essential hypertension      MULTIPLE VITAMINS-CALCIUM PO Take 1 tablet by mouth daily      ONETOUCH DELICA LANCETS 41D MISC Test blood sugar twice daily  Qty: 200 each, Refills: 3    Associated Diagnoses: Type 2 diabetes mellitus with hyperglycemia, without long-term current use of insulin ([facility])      metFORMIN (GLUCOPHAGE) 500 mg tablet Take 1 tablet (500 mg total) by mouth 2 (two) times a day After meals  Qty: 60 tablet, Refills: 6    Associated Diagnoses: Type 2 diabetes mellitus with hyperglycemia, without long-term current use of insulin (HCC)         STOP taking these medications       predniSONE 20 mg tablet Comments:   Reason for Stopping:             Outpatient Discharge Orders   Complete PFT with post bronchodilator   Standing Status: Future Standing Exp  Date: 07/19/22       PDMP Review     None           ED Provider  Attending physically available and evaluated Karly Bright I managed the patient along with the ED Attending      Electronically Signed by         Odilia Holly MD  07/20/21 1149

## 2021-07-19 ENCOUNTER — APPOINTMENT (INPATIENT)
Dept: NON INVASIVE DIAGNOSTICS | Facility: HOSPITAL | Age: 56
DRG: 202 | End: 2021-07-19
Payer: COMMERCIAL

## 2021-07-19 PROBLEM — I42.9 CARDIOMYOPATHY (HCC): Status: ACTIVE | Noted: 2021-07-19

## 2021-07-19 LAB
ANION GAP SERPL CALCULATED.3IONS-SCNC: 8 MMOL/L (ref 4–13)
BASOPHILS # BLD AUTO: 0.02 THOUSANDS/ΜL (ref 0–0.1)
BASOPHILS NFR BLD AUTO: 0 % (ref 0–1)
BUN SERPL-MCNC: 20 MG/DL (ref 5–25)
CALCIUM SERPL-MCNC: 8.6 MG/DL (ref 8.3–10.1)
CHLORIDE SERPL-SCNC: 99 MMOL/L (ref 100–108)
CO2 SERPL-SCNC: 26 MMOL/L (ref 21–32)
CREAT SERPL-MCNC: 1.14 MG/DL (ref 0.6–1.3)
D DIMER PPP FEU-MCNC: 0.32 UG/ML FEU
EOSINOPHIL # BLD AUTO: 0.01 THOUSAND/ΜL (ref 0–0.61)
EOSINOPHIL NFR BLD AUTO: 0 % (ref 0–6)
ERYTHROCYTE [DISTWIDTH] IN BLOOD BY AUTOMATED COUNT: 11.8 % (ref 11.6–15.1)
GFR SERPL CREATININE-BSD FRML MDRD: 73 ML/MIN/1.73SQ M
GLUCOSE SERPL-MCNC: 312 MG/DL (ref 65–140)
GLUCOSE SERPL-MCNC: 312 MG/DL (ref 65–140)
GLUCOSE SERPL-MCNC: 325 MG/DL (ref 65–140)
GLUCOSE SERPL-MCNC: 347 MG/DL (ref 65–140)
GLUCOSE SERPL-MCNC: 358 MG/DL (ref 65–140)
GLUCOSE SERPL-MCNC: 361 MG/DL (ref 65–140)
HCT VFR BLD AUTO: 38.7 % (ref 36.5–49.3)
HGB BLD-MCNC: 13.3 G/DL (ref 12–17)
IMM GRANULOCYTES # BLD AUTO: 0.07 THOUSAND/UL (ref 0–0.2)
IMM GRANULOCYTES NFR BLD AUTO: 1 % (ref 0–2)
LYMPHOCYTES # BLD AUTO: 0.31 THOUSANDS/ΜL (ref 0.6–4.47)
LYMPHOCYTES NFR BLD AUTO: 5 % (ref 14–44)
MAGNESIUM SERPL-MCNC: 1.7 MG/DL (ref 1.6–2.6)
MCH RBC QN AUTO: 32 PG (ref 26.8–34.3)
MCHC RBC AUTO-ENTMCNC: 34.4 G/DL (ref 31.4–37.4)
MCV RBC AUTO: 93 FL (ref 82–98)
MONOCYTES # BLD AUTO: 0.08 THOUSAND/ΜL (ref 0.17–1.22)
MONOCYTES NFR BLD AUTO: 1 % (ref 4–12)
NEUTROPHILS # BLD AUTO: 5.47 THOUSANDS/ΜL (ref 1.85–7.62)
NEUTS SEG NFR BLD AUTO: 93 % (ref 43–75)
NRBC BLD AUTO-RTO: 0 /100 WBCS
PHOSPHATE SERPL-MCNC: 3.6 MG/DL (ref 2.7–4.5)
PLATELET # BLD AUTO: 211 THOUSANDS/UL (ref 149–390)
PMV BLD AUTO: 9.9 FL (ref 8.9–12.7)
POTASSIUM SERPL-SCNC: 4.4 MMOL/L (ref 3.5–5.3)
RBC # BLD AUTO: 4.16 MILLION/UL (ref 3.88–5.62)
SODIUM SERPL-SCNC: 133 MMOL/L (ref 136–145)
TROPONIN I SERPL-MCNC: <0.02 NG/ML
TROPONIN I SERPL-MCNC: <0.02 NG/ML
TSH SERPL DL<=0.05 MIU/L-ACNC: 1.1 UIU/ML (ref 0.36–3.74)
WBC # BLD AUTO: 5.96 THOUSAND/UL (ref 4.31–10.16)

## 2021-07-19 PROCEDURE — 83735 ASSAY OF MAGNESIUM: CPT | Performed by: INTERNAL MEDICINE

## 2021-07-19 PROCEDURE — 84443 ASSAY THYROID STIM HORMONE: CPT | Performed by: INTERNAL MEDICINE

## 2021-07-19 PROCEDURE — 99222 1ST HOSP IP/OBS MODERATE 55: CPT | Performed by: INTERNAL MEDICINE

## 2021-07-19 PROCEDURE — 84484 ASSAY OF TROPONIN QUANT: CPT | Performed by: INTERNAL MEDICINE

## 2021-07-19 PROCEDURE — 94640 AIRWAY INHALATION TREATMENT: CPT

## 2021-07-19 PROCEDURE — 99232 SBSQ HOSP IP/OBS MODERATE 35: CPT | Performed by: NURSE PRACTITIONER

## 2021-07-19 PROCEDURE — 93306 TTE W/DOPPLER COMPLETE: CPT

## 2021-07-19 PROCEDURE — 84100 ASSAY OF PHOSPHORUS: CPT | Performed by: INTERNAL MEDICINE

## 2021-07-19 PROCEDURE — 82948 REAGENT STRIP/BLOOD GLUCOSE: CPT

## 2021-07-19 PROCEDURE — 94150 VITAL CAPACITY TEST: CPT

## 2021-07-19 PROCEDURE — 94760 N-INVAS EAR/PLS OXIMETRY 1: CPT

## 2021-07-19 PROCEDURE — 85025 COMPLETE CBC W/AUTO DIFF WBC: CPT | Performed by: INTERNAL MEDICINE

## 2021-07-19 PROCEDURE — 99222 1ST HOSP IP/OBS MODERATE 55: CPT | Performed by: NURSE PRACTITIONER

## 2021-07-19 PROCEDURE — 85379 FIBRIN DEGRADATION QUANT: CPT | Performed by: INTERNAL MEDICINE

## 2021-07-19 PROCEDURE — 36415 COLL VENOUS BLD VENIPUNCTURE: CPT | Performed by: INTERNAL MEDICINE

## 2021-07-19 PROCEDURE — 80048 BASIC METABOLIC PNL TOTAL CA: CPT | Performed by: INTERNAL MEDICINE

## 2021-07-19 PROCEDURE — 94664 DEMO&/EVAL PT USE INHALER: CPT

## 2021-07-19 RX ORDER — FUROSEMIDE 20 MG/1
20 TABLET ORAL DAILY
Status: DISCONTINUED | OUTPATIENT
Start: 2021-07-20 | End: 2021-07-20

## 2021-07-19 RX ORDER — FUROSEMIDE 10 MG/ML
40 INJECTION INTRAMUSCULAR; INTRAVENOUS ONCE
Status: COMPLETED | OUTPATIENT
Start: 2021-07-19 | End: 2021-07-19

## 2021-07-19 RX ORDER — ALBUTEROL SULFATE 2.5 MG/3ML
2.5 SOLUTION RESPIRATORY (INHALATION) EVERY 4 HOURS PRN
Status: DISCONTINUED | OUTPATIENT
Start: 2021-07-19 | End: 2021-07-22 | Stop reason: HOSPADM

## 2021-07-19 RX ORDER — ALBUTEROL SULFATE 90 UG/1
2 AEROSOL, METERED RESPIRATORY (INHALATION) EVERY 4 HOURS PRN
Status: DISCONTINUED | OUTPATIENT
Start: 2021-07-19 | End: 2021-07-19

## 2021-07-19 RX ORDER — BUDESONIDE 0.5 MG/2ML
0.5 INHALANT ORAL
Status: DISCONTINUED | OUTPATIENT
Start: 2021-07-19 | End: 2021-07-22 | Stop reason: HOSPADM

## 2021-07-19 RX ORDER — INSULIN GLARGINE 100 [IU]/ML
8 INJECTION, SOLUTION SUBCUTANEOUS
Status: DISCONTINUED | OUTPATIENT
Start: 2021-07-19 | End: 2021-07-20

## 2021-07-19 RX ADMIN — B-COMPLEX W/ C & FOLIC ACID TAB 1 TABLET: TAB at 08:20

## 2021-07-19 RX ADMIN — ISODIUM CHLORIDE 3 ML: 0.03 SOLUTION RESPIRATORY (INHALATION) at 07:44

## 2021-07-19 RX ADMIN — BUDESONIDE 0.5 MG: 0.5 INHALANT ORAL at 20:18

## 2021-07-19 RX ADMIN — INSULIN LISPRO 10 UNITS: 100 INJECTION, SOLUTION INTRAVENOUS; SUBCUTANEOUS at 16:46

## 2021-07-19 RX ADMIN — ISODIUM CHLORIDE 3 ML: 0.03 SOLUTION RESPIRATORY (INHALATION) at 13:56

## 2021-07-19 RX ADMIN — INSULIN LISPRO 3 UNITS: 100 INJECTION, SOLUTION INTRAVENOUS; SUBCUTANEOUS at 00:32

## 2021-07-19 RX ADMIN — METHYLPREDNISOLONE SODIUM SUCCINATE 40 MG: 40 INJECTION, POWDER, FOR SOLUTION INTRAMUSCULAR; INTRAVENOUS at 23:46

## 2021-07-19 RX ADMIN — INSULIN LISPRO 3 UNITS: 100 INJECTION, SOLUTION INTRAVENOUS; SUBCUTANEOUS at 21:56

## 2021-07-19 RX ADMIN — INSULIN LISPRO 8 UNITS: 100 INJECTION, SOLUTION INTRAVENOUS; SUBCUTANEOUS at 11:35

## 2021-07-19 RX ADMIN — MONTELUKAST SODIUM 10 MG: 10 TABLET, FILM COATED ORAL at 21:55

## 2021-07-19 RX ADMIN — GUAIFENESIN 600 MG: 600 TABLET, EXTENDED RELEASE ORAL at 00:29

## 2021-07-19 RX ADMIN — GUAIFENESIN 600 MG: 600 TABLET, EXTENDED RELEASE ORAL at 17:37

## 2021-07-19 RX ADMIN — FUROSEMIDE 40 MG: 10 INJECTION, SOLUTION INTRAVENOUS at 11:35

## 2021-07-19 RX ADMIN — LEVALBUTEROL HYDROCHLORIDE 1.25 MG: 1.25 SOLUTION, CONCENTRATE RESPIRATORY (INHALATION) at 13:56

## 2021-07-19 RX ADMIN — METHYLPREDNISOLONE SODIUM SUCCINATE 40 MG: 40 INJECTION, POWDER, FOR SOLUTION INTRAMUSCULAR; INTRAVENOUS at 11:35

## 2021-07-19 RX ADMIN — LEVALBUTEROL HYDROCHLORIDE 1.25 MG: 1.25 SOLUTION, CONCENTRATE RESPIRATORY (INHALATION) at 07:44

## 2021-07-19 RX ADMIN — MONTELUKAST SODIUM 10 MG: 10 TABLET, FILM COATED ORAL at 00:29

## 2021-07-19 RX ADMIN — INSULIN LISPRO 8 UNITS: 100 INJECTION, SOLUTION INTRAVENOUS; SUBCUTANEOUS at 06:15

## 2021-07-19 RX ADMIN — GUAIFENESIN 600 MG: 600 TABLET, EXTENDED RELEASE ORAL at 08:20

## 2021-07-19 RX ADMIN — LEVALBUTEROL HYDROCHLORIDE 1.25 MG: 1.25 SOLUTION, CONCENTRATE RESPIRATORY (INHALATION) at 20:17

## 2021-07-19 RX ADMIN — METHYLPREDNISOLONE SODIUM SUCCINATE 40 MG: 40 INJECTION, POWDER, FOR SOLUTION INTRAMUSCULAR; INTRAVENOUS at 06:11

## 2021-07-19 RX ADMIN — ENOXAPARIN SODIUM 40 MG: 40 INJECTION SUBCUTANEOUS at 08:23

## 2021-07-19 RX ADMIN — LEVALBUTEROL HYDROCHLORIDE 1.25 MG: 1.25 SOLUTION, CONCENTRATE RESPIRATORY (INHALATION) at 00:29

## 2021-07-19 RX ADMIN — LISINOPRIL 30 MG: 20 TABLET ORAL at 08:20

## 2021-07-19 RX ADMIN — METHYLPREDNISOLONE SODIUM SUCCINATE 40 MG: 40 INJECTION, POWDER, FOR SOLUTION INTRAMUSCULAR; INTRAVENOUS at 17:37

## 2021-07-19 RX ADMIN — ATORVASTATIN CALCIUM 10 MG: 20 TABLET, FILM COATED ORAL at 17:37

## 2021-07-19 RX ADMIN — INSULIN GLARGINE 8 UNITS: 100 INJECTION, SOLUTION SUBCUTANEOUS at 21:55

## 2021-07-19 RX ADMIN — ISODIUM CHLORIDE 3 ML: 0.03 SOLUTION RESPIRATORY (INHALATION) at 20:18

## 2021-07-19 RX ADMIN — FUROSEMIDE 20 MG: 20 TABLET ORAL at 08:20

## 2021-07-19 RX ADMIN — ISODIUM CHLORIDE 3 ML: 0.03 SOLUTION RESPIRATORY (INHALATION) at 00:29

## 2021-07-19 RX ADMIN — METHYLPREDNISOLONE SODIUM SUCCINATE 40 MG: 40 INJECTION, POWDER, FOR SOLUTION INTRAMUSCULAR; INTRAVENOUS at 00:29

## 2021-07-19 NOTE — H&P
[address] 1965, 49 y o  male MRN: [id number]  Unit/Bed#: ED 02 Encounter: [id number]  Primary Care Provider: Lee Dubois MD   Date and time admitted to hospital: 7/18/2021  5:39 PM    * Moderate persistent asthma with acute exacerbation  Assessment & Plan  Patient is on Proventil as well as prednisone with not much improvement  Currently with wheezes and BNP is 605 with a chest x-ray that is more indicative of air trapping and airspace disease  Most likely is asthma with acute exacerbation than CHF  Will put on hold prednisone in favor of methylprednisolone 125 mg x 1 and to continue with 40 mg every 6 hours  Will also place patient on albuterol nebulizations  Guaifenesin 600 mg twice daily  Singulair to start tonight  Pulmonary consult regarding possible asthma although will also get Cardiology to see patient regarding essential hypertension and possible cardiac asthma  Although troponin is 0 02 will trend x3  VBG  Mixed hyperlipidemia  Assessment & Plan  Continue Lipitor 10 mg daily  Essential hypertension  Assessment & Plan  Continue furosemide 20 mg daily  Continue lisinopril 30 mg daily  Type 2 diabetes mellitus with hyperglycemia (HCC)  Assessment & Plan  Lab Results   Component Value Date    HGBA1C 7 4 (A) 05/14/2021     Would put on hold metformin and Januvia; check blood sugars before meals and before bedtime  Place patient on insulin sliding scale per protocol  No results for input(s): POCGLU in the last 72 hours  Blood Sugar Average: Last 72 hrs:      Irritable bowel syndrome with diarrhea  Assessment & Plan  Currently without much complaint  VICKY (obstructive sleep apnea)  Assessment & Plan  Continue CPAP at bedtime          VTE Prophylaxis: Enoxaparin (Lovenox)  / sequential compression device   Code Status: No Order full Code as discussed  POLST: There is no POLST form on file for this patient (pre-hospital)    Anticipated Length of Stay:  Patient will be admitted on an Inpatient basis with an anticipated length of stay of  greater than 2 midnights  Justification for Hospital Stay: Please see detailed plans noted above  Chief Complaint:     Shortness of breath with no improvement; wheezing with hypoxia  History of Present Illness:  Jensen Garcia is a 49 y o  male who has past medical history significant for morbid obesity, essential hypertension, diabetes mellitus not on insulin, irritability bowel syndrome, former cigar smoker, obstructive sleep apnea on CPAP, and mixed hyperlipidemia comes in for shortness of breath  According to the patient, this has been going on for approximately 3 to 4 days now, and was seen here in the emergency room on July 15 with complaints of fevers as high as 100° F  He also has cough and congestion with postnasal drip  Patient reports that he has always been inside the house with air conditioning on and the house windows are still closed  Noted is that patient was recently diagnosed to have an ejection fraction which is reduced seen at 40 to 45%  However, there was no note of pulmonary hypertension or diastolic dysfunction or pulmonary valve dysfunction  Patient is supposed to see Cardiology in the future but has not yet been seen  Likewise, the patient states that he has shortness of breath with note of chest tightness however current troponin is less than 0 02  Here in the emergency room, he received nebulization of DuoNeb with not much improvement  By physical examination has overall wheezing and rhonchi with no note of crackles and the chest x-ray does not seem to be consistent with failure  Instead, it has air trapping and airspace disease  Patient states that he cannot sleep lying flat on bed    Noted is that he was placed on Lasix at the time that he was diagnosed to have a reduced ejection fraction and currently his swelling has gone down with no note of any recent weight gain  He does not have any leg edema as well  Review of Systems:    Constitutional:  Denies fever or chills   Eyes:  Denies change in visual acuity   HENT:  Has nasal congestion with no sore throat, reactive airway  Respiratory:  Shortness of breath especially with movement; also coughing  More coughing when he lies flat  Reactive airway with wheezes and upper airway wheezing  Cardiovascular:  No edema but with chest tightness  GI:  Denies abdominal pain, nausea, vomiting, bloody stools or diarrhea   :  Denies dysuria   Musculoskeletal:  Denies back pain or joint pain   Integument:  Denies rash   Neurologic:  Denies headache, focal weakness or sensory changes   Endocrine:  Denies polyuria or polydipsia   Lymphatic:  Denies swollen glands   Psychiatric:  Denies depression or anxiety     Past Medical and Surgical History:   Past Medical History:   Diagnosis Date    Acute gastritis without hemorrhage     last assessed 3/24/17    Asthma     Diabetes mellitus ([facility] [address] )     Gastric bypass status for obesity     Hyperlipidemia     Hypertension     Impaired fasting glucose     last assessed 5/10/17    Obesity     Viral gastroenteritis     last assessed 11/4/16     Past Surgical History:   Procedure Laterality Date    CARPAL TUNNEL RELEASE      bilateral    GASTRIC BYPASS  2004    with han en y   [address]      ventral inscisional    TONSILLECTOMY         Meds/Allergies:  (Not in a hospital admission)      Allergies: Allergies   Allergen Reactions    Azithromycin Other (See Comments)     Shaky, uneasy feeling     Bactrim [Sulfamethoxazole-Trimethoprim] Other (See Comments)     shakiness     History:  Marital Status: /Civil Union   Occupation:  He drives for a living    Patient Pre-hospital Living Situation:  Lives at home with wife  Patient Pre-hospital Level of Mobility:  Ambulatory  Patient Pre-hospital Diet Restrictions:  Regular diet diabetic and cardiac  Substance Use History:   Social History     Substance and Sexual Activity   Alcohol Use Yes    Comment: social     Social History     Tobacco Use   Smoking Status Light Tobacco Smoker    Types: Cigars   Smokeless Tobacco Current User   Tobacco Comment    rare     Social History     Substance and Sexual Activity   Drug Use No       Family History:  Family History   Problem Relation Age of Onset    Stroke Mother     Hypertension Father        Physical Exam:     Vitals:   Blood Pressure: (!) 180/89 (07/18/21 1741)  Pulse: 96 (07/18/21 1739)  Temperature: 98 °F (36 7 °C) (07/18/21 1810)  Temp Source: Oral (07/18/21 1810)  Respirations: 20 (07/18/21 1739)  Weight - Scale: (!) 163 kg (360 lb) (07/18/21 1739)  SpO2: 94 % (07/18/21 1739)    Constitutional:  Well developed, well nourished morbidly obese, no acute distress, non-toxic appearance but with reactive airway  Eyes:  PERRL, conjunctiva normal   HENT:  Atraumatic, external ears normal, nose normal, oropharynx moist, no pharyngeal exudates  With note of nasal congestion Neck- normal range of motion, no tenderness, supple   Respiratory:  No respiratory distress, but with reactive airway, bronchial breath sounds, rhonchi and wheezing all over  No note of any crackles  No rales noted   Cardiovascular:  Normal rate, normal rhythm, no murmurs, no gallops, no rubs   GI:  Soft, nondistended, normal bowel sounds, nontender, no organomegaly, no mass, no rebound, no guarding   :  No costovertebral angle tenderness   Musculoskeletal:  No edema, no tenderness, no deformities  Back- no tenderness  Integument:  Well hydrated, no rash   Lymphatic:  No lymphadenopathy noted   Neurologic:  Alert &awake, communicative, CN 2-12 normal, normal motor function, normal sensory function, no focal deficits noted   Psychiatric:  Speech and behavior appropriate       Lab Results: I have personally reviewed pertinent reports        Results from last 7 days   Lab Units 07/18/21 1815   WBC Thousand/uL 9 40   HEMOGLOBIN g/dL 13 5   HEMATOCRIT % 39 0   PLATELETS Thousands/uL 252   NEUTROS PCT % 88*   LYMPHS PCT % 7*   MONOS PCT % 4   EOS PCT % 0     Results from last 7 days   Lab Units 07/18/21  1815   POTASSIUM mmol/L 3 9   CHLORIDE mmol/L 101   CO2 mmol/L 23   BUN mg/dL 19   CREATININE mg/dL 1 02   CALCIUM mg/dL 8 5   ALK PHOS U/L 89   ALT U/L 20   AST U/L 16           EKG:  Sinus tachycardia with nonspecific ST T-wave flattening    Imaging: I have personally reviewed pertinent reports  XR chest 1 view portable    Result Date: 7/15/2021  Narrative: CHEST INDICATION:   sob, cp  Smoker  COMPARISON:  6/25/2010 EXAM PERFORMED/VIEWS:  XR CHEST PORTABLE FINDINGS: Cardiomediastinal silhouette appears unremarkable  The lungs are clear  No pneumothorax or pleural effusion  Osseous structures appear within normal limits for patient age  Impression: No acute cardiopulmonary disease  Workstation performed: [id number]         ** Please Note: Dragon 360 Dictation voice to text software was used in the creation of this document   **

## 2021-07-19 NOTE — PROGRESS NOTES
Patient asymptomatic with sbp of 177  Dr Jadiel Alejandro was made aware, will forward to NICOLE  [facility] NP who will be seeing patient today  No further orders given  Stated to notify physician if sbp greater than 180  Will continue to monitor

## 2021-07-19 NOTE — UTILIZATION REVIEW
Initial Clinical Review    Admission: Date/Time/Statement:   Admission Orders (From admission, onward)     Ordered        07/18/21 2042  Inpatient Admission  Once                   Orders Placed This Encounter   Procedures    Inpatient Admission     Standing Status:   Standing     Number of Occurrences:   1     Order Specific Question:   Level of Care     Answer:   Med Surg [16]     Order Specific Question:   Estimated length of stay     Answer:   More than 2 Midnights     Order Specific Question:   Certification     Answer:   I certify that inpatient services are medically necessary for this patient for a duration of greater than two midnights  See H&P and MD Progress Notes for additional information about the patient's course of treatment  ED Arrival Information     Expected Arrival Acuity    - 7/18/2021 17:15 Emergent         Means of arrival Escorted by Service Admission [facility]    [facility]         Arrival complaint    chest pain, sob        Chief Complaint   Patient presents with    Chest Pain     Pt reports increased sob and cp since being seen here last thusrday   Shortness of Breath       Initial Presentation: 49year old male, presented to the ED @ [facility] from home via walk in  Admitted as Inpatient due to Moderate persistent asthma with acute exacerbation  Past medical history significant for morbid obesity, essential hypertension, diabetes mellitus not on insulin, irritability bowel syndrome, former cigar smoker, obstructive sleep apnea on CPAP, and mixed hyperlipidemia  Date: 07/18/2021    He was evaluated in the emergency department 3 days ago had labs performed and was given a breathing treatment and steroid course which provided temporary relief but has since worsened  He states he has been using his inhaler every 3-4 hours and has been taking his prednisone as prescribed    Patient states the chest pain is located in the center of his chest   Chest pain is worse with exertion  States his shortness of breath is worse with exertion and lying flat  Also endorses occasional lightheadedness  Patient states he has had a dry cough over the past 5 days  Currently with wheezes and BNP is 605 with a chest x-ray that is more indicative of air trapping and airspace disease  IV VTE Prophylaxis: Enoxaparin (Lovenox)  / sequential compression devicemethylprednisolone  albuterol neds  Start singular  Consult Pulmonary  Trend trops  VTE Prophylaxis: Enoxaparin (Lovenox)  / sequential compression device    Day 2: 07/19/2021Continue solumedrol 40 IV q6h  Continue albuterol nebs q4h PRN  Add pulmicort  Change lasix to 40 IV daily  Mointor I&O, daily weight       07/19/2021  Consult Pulmonary:    22-year-old morbidly obese male presenting with shortness of breath likely due to underlying asthma and congestive heart failure  Symptomatically he is improving  Will continue a 40 mg q 6 hours given extensive wheezing  Continue diuresis per primary team   Will need outpatient pulmonary follow-up and PFTs     07/19/2021  Consult Cardio: Shortness of breath: likely combination of asthma with acute exacerbation and some component of underlying chronic CHF  Pulmonary is to evaluate patient and treating for asthma, d-dimer  Will give dose of IV lasix 40mg x1 and monitor response and checking updated echo  Cardiomyopathy: LVEF 40-45% on echo 3/2020; never seen by cardiology  Was placed on lasix 20mg daily by PCP about 8 months ago  Will check updated echo as it has been > 1 year; will see what results of echo show now  Will need ischemic eval once asthma exacerbation and breathing has improved to baseline; likely as OP  Place on telemetry to ensure no tachyarrhythmias provoked by albuterol use  Trop negative x3; no need to continue to trend     Asthma: with acute exacerbation: pulmonary to evaluate; on IV steriods and nebs; some improvement; he is still wheezing on exam    HTN: BP elevated here; reports readings at home of 130-140s/80s; monitor; giving dose of IV lasix here  ED Triage Vitals   Temperature Pulse Respirations Blood Pressure SpO2   21 18121 17321 17321 17421 173   98 °F (36 7 °C) 96 20 (!) 180/89 94 %      Temp Source Heart Rate Source Patient Position - Orthostatic VS BP Location FiO2 (%)   21 18121 17321 17321 --   Oral Monitor Sitting Right arm       Pain Score       21 1739       4          Wt Readings from Last 1 Encounters:   21 (!) 163 kg (360 lb)     Additional Vital Signs:   Date/Time  Temp  Pulse  Resp  BP  MAP (mmHg)  SpO2  O2 Device  Patient Position - Orthostatic VS   21 19:35:51  98 5 °F (36 9 °C)  69  20  175/88Abnormal   117  91 %  --  --   21 14:28:16  97 8 °F (36 6 °C)  83  20  172/93Abnormal   119  93 %  --  --   21 1356  --  --  --  --  --  95 %  --  --   21 10:49:05  97 6 °F (36 4 °C)  70  --  177/91Abnormal   120  93 %  --  --   21 09:22:48  --  79  --  177/88Abnormal   118  92 %  --  --   21 08:01:16  --  77  --  180/89Abnormal   119  94 %  --  --   21 0800  --  --  --  --  --  93 %  None (Room air)  --   21 0748  --  --  --  --  --  94 %  None (Room air)  --   21 0600  --  --  --  --  --  --  None (Room air)  --   21 0558  99 1 °F (37 3 °C)  67  20  182/102Abnormal   129  94 %  None (Room air)  Sitting   21 0046  --  83  20  190/85Abnormal   --  93 %  None (Room air)  Lying   21 2312  --  88  26Abnormal   198/88Abnormal   --  95 %  --  Lying     Date and Time Eye Opening Best Verbal Response Best Motor Response [place] Coma Scale Score   21 0800 4 5 6 15   21 0600 4 5 6 15   21 0107 4 5 6 15     2021 @ 0924  Chest X:  No acute cardiopulmonary disease  07/15/2021 @ 1639  Chest X:  No acute cardiopulmonary disease       2021 @ 1743  EC, Sinus rhythm with occasional Premature ventricular complexes    Pertinent Labs/Diagnostic Test Results:     Results from last 7 days   Lab Units 07/19/21  0415 07/18/21  1815 07/15/21  1515   WBC Thousand/uL 5 96 9 40 7 19   HEMOGLOBIN g/dL 13 3 13 5 14 7   HEMATOCRIT % 38 7 39 0 44 0   PLATELETS Thousands/uL 211 252 273   NEUTROS ABS Thousands/µL 5 47 8 23* 4 56     Results from last 7 days   Lab Units 07/19/21  0415 07/18/21  1815 07/15/21  1515   SODIUM mmol/L 133* 134* 137   POTASSIUM mmol/L 4 4 3 9 3 5   CHLORIDE mmol/L 99* 101 102   CO2 mmol/L 26 23 27   ANION GAP mmol/L 8 10 8   BUN mg/dL 20 19 16   CREATININE mg/dL 1 14 1 02 1 25   EGFR ml/min/1 73sq m 73 83 65   CALCIUM mg/dL 8 6 8 5 8 8   MAGNESIUM mg/dL 1 7  --   --    PHOSPHORUS mg/dL 3 6  --   --      Results from last 7 days   Lab Units 07/18/21  1815 07/15/21  1515   AST U/L 16 19   ALT U/L 20 20   ALK PHOS U/L 89 106   TOTAL PROTEIN g/dL 6 7 7 6   ALBUMIN g/dL 2 9* 3 4*   TOTAL BILIRUBIN mg/dL 0 41 0 63     Results from last 7 days   Lab Units 07/19/21  1632 07/19/21  1056 07/19/21  0613 07/19/21  0032   POC GLUCOSE mg/dl 358* 325* 347* 312*     Results from last 7 days   Lab Units 07/19/21  0415 07/18/21  1815 07/15/21  1515   GLUCOSE RANDOM mg/dL 361* 381* 169*     Results from last 7 days   Lab Units 07/18/21  2312   HEMOGLOBIN A1C % 7 1*   EAG mg/dl 157     Results from last 7 days   Lab Units 07/18/21  2312   PH OMERO  7 481*   PCO2 OMERO mm Hg 32 5*   PO2 OMERO mm Hg 66 5*   HCO3 OMERO mmol/L 23 7*   BASE EXC OMERO mmol/L 0 9   O2 CONTENT OMERO ml/dL 18 3   O2 HGB, VENOUS % 92 9*     Results from last 7 days   Lab Units 07/19/21  0415 07/19/21  0059 07/18/21  2312 07/18/21  1815 07/15/21  1515   TROPONIN I ng/mL <0 02 <0 02 <0 02 <0 02 <0 02     Results from last 7 days   Lab Units 07/19/21  0059   D-DIMER QUANTITATIVE ug/ml FEU 0 32     Results from last 7 days   Lab Units 07/19/21  0415   TSH 3RD GENERATON uIU/mL 1 100     Results from last 7 days   Lab Units 07/18/21  1815   NT-PRO BNP pg/mL 605*     Results from last 7 days   Lab Units 07/18/21  2318   CLARITY UA  Clear   COLOR UA  Yellow   SPEC GRAV UA  1 026   PH UA  5 5   GLUCOSE UA mg/dl >=1000 (1%)*   KETONES UA mg/dl Trace*   BLOOD UA  Negative   PROTEIN UA mg/dl Negative   NITRITE UA  Negative   BILIRUBIN UA  Negative   UROBILINOGEN UA E U /dl 0 2   LEUKOCYTES UA  Elevated glucose may cause decreased leukocyte values   See urine microscopic for [facility] result/*   WBC UA /hpf None Seen   RBC UA /hpf None Seen   BACTERIA UA /hpf Occasional   EPITHELIAL CELLS WET PREP /hpf None Seen   MUCUS THREADS  None Seen     ED Treatment:   Medication Administration from 07/18/2021 1715 to 07/19/2021 0556       Date/Time Order Dose Route Action     07/18/2021 1816 albuterol inhalation solution 5 mg 5 mg Nebulization Given     07/18/2021 1816 ipratropium (ATROVENT) 0 02 % inhalation solution 0 5 mg 0 5 mg Nebulization Given     07/18/2021 2053 methylPREDNISolone sodium succinate (Solu-MEDROL) injection 125 mg 125 mg Intravenous Given     07/19/2021 0029 guaiFENesin (MUCINEX) 12 hr tablet 600 mg 600 mg Oral Given     07/19/2021 0029 montelukast (SINGULAIR) tablet 10 mg 10 mg Oral Given     07/19/2021 0029 levalbuterol (XOPENEX) inhalation solution 1 25 mg 1 25 mg Nebulization Given     07/19/2021 0029 sodium chloride 0 9 % inhalation solution 3 mL 3 mL Nebulization Given     07/19/2021 0029 methylPREDNISolone sodium succinate (Solu-MEDROL) injection 40 mg 40 mg Intravenous Given     07/19/2021 0032 insulin lispro (HumaLOG) 100 units/mL subcutaneous injection 1-5 Units 3 Units Subcutaneous Given        Past Medical History:   Diagnosis Date    Acute gastritis without hemorrhage     last assessed 3/24/17    Asthma     Diabetes mellitus ([facility] [address] )     Gastric bypass status for obesity     Hyperlipidemia     Hypertension     Impaired fasting glucose     last assessed 5/10/17    Obesity     Viral gastroenteritis     last assessed 11/4/16     Present on Admission:   Essential hypertension   Mixed hyperlipidemia   Irritable bowel syndrome with diarrhea   Type 2 diabetes mellitus with hyperglycemia (HCC)   VICKY (obstructive sleep apnea)      Admitting Diagnosis: Shortness of breath [R06 02]  CHF (congestive heart failure) (HCC) [I50 9]  Chest pain [R07 9]  SOB (shortness of breath) [R06 02]  Decreased left ventricular systolic function [F06 0]  Moderate persistent asthma with acute exacerbation [J45 41]  Age/Sex: 49 y o  male  Admission Orders:  Scheduled Medications:  atorvastatin, 10 mg, Oral, After Dinner  budesonide, 0 5 mg, Nebulization, Q12H  enoxaparin, 40 mg, Subcutaneous, Daily  [START ON 7/20/2021] furosemide, 20 mg, Oral, Daily  guaiFENesin, 600 mg, Oral, BID  insulin glargine, 8 Units, Subcutaneous, HS  insulin lispro, 1-5 Units, Subcutaneous, HS  insulin lispro, 2-12 Units, Subcutaneous, TID AC  levalbuterol, 1 25 mg, Nebulization, TID   And  sodium chloride, 3 mL, Nebulization, TID  lisinopril, 30 mg, Oral, Daily  methylPREDNISolone sodium succinate, 40 mg, Intravenous, Q6H ANA  montelukast, 10 mg, Oral, HS  multivitamin stress formula, 1 tablet, Oral, Daily      Continuous IV Infusions:     PRN Meds:  acetaminophen, 650 mg, Oral, Q6H PRN  albuterol, 2 5 mg, Nebulization, Q4H PRN  aluminum-magnesium hydroxide-simethicone, 30 mL, Oral, Q6H PRN  docusate sodium, 100 mg, Oral, BID PRN  ondansetron, 4 mg, Intravenous, Q6H PRN      Isaak SCDs  IP CONSULT TO CARDIOLOGY  IP CONSULT TO PULMONOLOGY    Network Utilization Review Department  ATTENTION: Please call with any questions or concerns to [phone] and carefully listen to the prompts so that you are directed to the right person  All voicemails are confidential   Nikkie Quintero all requests for admission clinical reviews, approved or denied determinations and any other requests to dedicated fax number below belonging to the campus where the patient is receiving treatment   List of dedicated fax numbers for the Facilities:  [address] [place] [address] DENIALS (Administrative/Medical Necessity) [phone]   [address]  (Maternity/NICU/Pediatrics) [phone]   [address] Red Snyder [address] [address] [place] [address] [address] Xavier [address] [facility] [phone]

## 2021-07-19 NOTE — QUICK NOTE
PH of 7 48 which is more consistent with respiratory alkalosis with note of mildly low bicarbonate  Patient's troponin continues to be negative at less than 0 02    Would like to get D-dimer in next blood draw and if elevated might need CT of the chest to rule out possibility of PE?

## 2021-07-19 NOTE — CONSULTS
Consultation - Cardiology Team One  Nenita Oliver 49 y o  male MRN: [id number]  Unit/Bed#: [facility] 401-01 Encounter: [id number]    Inpatient consult to Cardiology  Consult performed by: RODRIGUE Fernández  Consult ordered by: Griffin Banegas MD          Physician Requesting Consult: Griffin Banegas MD     Reason for Consult / Principal Problem: shortness of breath      Assessment/ Plan:    1  Shortness of breath: likely combination of asthma with acute exacerbation and some component of underlying chronic CHF  Pulmonary is to evaluate patient and treating for asthma, d-dimer  Will give dose of IV lasix 40mg x1 and monitor response and checking updated echo  2  Cardiomyopathy: LVEF 40-45% on echo 3/2020; never seen by cardiology  Was placed on lasix 20mg daily by PCP about 8 months ago  Will check updated echo as it has been > 1 year; will see what results of echo show now  Will need ischemic eval once asthma exacerbation and breathing has improved to baseline; likely as OP  Place on telemetry to ensure no tachyarrhythmias provoked by albuterol use  Trop negative x3; no need to continue to trend  3  Asthma: with acute exacerbation: pulmonary to evaluate; on IV steriods and nebs; some improvement; he is still wheezing on exam  4  HTN: BP elevated here; reports readings at home of 130-140s/80s; monitor; giving dose of IV lasix here  5  HLD: continue statin  6  DM Type II: Aic 7 1; defer management to primary team  7  VICKY: reports compliance with Cpap  8  Obesity: wt loss/lifestyle modifications recommended       History of Present Illness      HPI: Nenita Oliver is a 49y o  year old male who has a hypertension, decreased LV systolic function (EF 15-11% 3/2020), diabetes mellitus 2, dyslipidemia, VICKY with CPAP, asthma, obesity  Does not follow with a cardiologist     Pt presented to ED with complaints of worsening SOB and coughing   Was seen in ED on 7/15 for same; given nebulizer and steroids with some mild improvement in symptoms and discharged on oral prednisone  He did not see any significant improvement in his symptoms  Continue to have wheezing, coughing, orthopnea so returned to ED,     On presentation yesterday he was hypertensive with /89; afebrile, SPO2 94% on RA  Cxray NAD  EKG showing Sinus rhythm with PVC  Labs significant for NT-ProBNP 605, serial trop negative, Cr 1 02, CBC unremarkable  He was given nebulizer; IV steriods and admitted for further management  Pulmonary consulted for asthma management and cardiology asked ot see patient regarding possible component of CHF given an abnormal Echo 3/2020  At time of my exam pt reports feeling a little bit better since admission, he is still very SOB with exertion and wheezing and coughing  He is having some chest pain with coughing; none with exertion or rest; described as a soreness  This started last week with his coughing fits  He denies any wt gain (lost 5lbs in past month)  Has had some increased LE edema since starting steroids  + orthopnea as laying flat makes him cough  He denies any prior hx of CAD, MI, CHF  He underwent an echo last March ordered by PCP gerson to his age and hx of htn; he was not having any active cardiac symptoms or complaints; this showed  LVEF 40-45%; no RWMA, normal RV size and function, mildly dilated LA, no significant valvular disease  He was referred to cardiology but has not seen them yet  He had an appt last week that he was unable to make it to due to his acute illness  Since this echo he was started on lasix 20mg daily for LE edema; thus typically controls it well  He denies wt gain; lost 5 lbs in past month  He typically lays flat in bed with Cpap on and does not have trouble sleeping   He doesn't exercise but 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 [facility], his bedroom is on 3rd floor and he can go up 2 flights of stars without stopping; he does get SOB but this has been chronic and is relieved with his albuterol; he does not get any chest pain or pressure with this activity level  Occasionally gets palpitations; typically last a few seconds; nothing prolonged  His father had hear problems in his 46s; not CAD  Mother  of CVA  No siblings with heart disease that he is aware of  Smoke cigars rarely' last use was over a year ago  No cigarette use  EKG reviewed personally:  2021  Sinus rhythm  PVC    Telemetry reviewed personally:   No telemetry    Review of Systems   Constitutional: Negative for chills, decreased appetite and fever  Cardiovascular: Positive for dyspnea on exertion, leg swelling and orthopnea  Negative for chest pain (chest soreness with coughing), irregular heartbeat, palpitations and syncope  Respiratory: Positive for cough, shortness of breath, sleep disturbances due to breathing and wheezing  Negative for sputum production  Gastrointestinal: Negative for bloating, abdominal pain, nausea and vomiting  Genitourinary: Negative for dysuria  Neurological: Negative for dizziness and light-headedness  Psychiatric/Behavioral: Negative for altered mental status  All other systems reviewed and are negative       Historical Information   Past Medical History:   Diagnosis Date    Acute gastritis without hemorrhage     last assessed 3/24/17    Asthma     Diabetes mellitus ([facility] [address] )     Gastric bypass status for obesity     Hyperlipidemia     Hypertension     Impaired fasting glucose     last assessed 5/10/17    Obesity     Viral gastroenteritis     last assessed 16     Past Surgical History:   Procedure Laterality Date    CARPAL TUNNEL RELEASE      bilateral    GASTRIC BYPASS      with han en y   [address]      ventral inscisional    TONSILLECTOMY       Social History     Substance and Sexual Activity   Alcohol Use Yes    Comment: social     Social History     Substance and Sexual Activity   Drug Use No     Social History Tobacco Use   Smoking Status Light Tobacco Smoker    Types: Cigars   Smokeless Tobacco Current User   Tobacco Comment    rare     Family History:   Family History   Problem Relation Age of Onset    Stroke Mother     Hypertension Father        Meds/Allergies   all current active meds have been reviewed       Allergies   Allergen Reactions    Azithromycin Other (See Comments)     Shaky, uneasy feeling     Bactrim [Sulfamethoxazole-Trimethoprim] Other (See Comments)     shakiness     Objective   Vitals: Blood pressure (!) 177/88, pulse 79, temperature 99 1 °F (37 3 °C), temperature source Oral, resp  rate 20, height 6' 1" (1 854 m), weight (!) 163 kg (360 lb), SpO2 92 %  ,     Body mass index is 47 5 kg/m²  ,     Systolic (91IPX), JJL:718 , Min:177 , UIO:452     Diastolic (27QTZ), LENNY:94, Min:85, Max:102    No intake or output data in the 24 hours ending 21 0941  Weight (last 2 days)     Date/Time   Weight    21 1739   (!) 163 (360)            Invasive Devices     Peripheral Intravenous Line            Peripheral IV 21 Right Antecubital <1 day              Physical Exam  Vitals reviewed  Constitutional:       General: He is not in acute distress  Appearance: Normal appearance  He is obese  He is not ill-appearing  HENT:      Head: Normocephalic  Mouth/Throat:      Mouth: Mucous membranes are moist    Cardiovascular:      Rate and Rhythm: Normal rate and regular rhythm  Heart sounds: S1 normal and S2 normal  No murmur heard  Pulmonary:      Effort: Pulmonary effort is normal       Breath sounds: Wheezing present  No rales  Abdominal:      Palpations: Abdomen is soft  Musculoskeletal:      Right lower le+ Edema present  Left lower le+ Edema present  Skin:     General: Skin is warm and dry  Neurological:      Mental Status: He is alert and oriented to person, place, and time     Psychiatric:         Mood and Affect: Mood normal        LABORATORY RESULTS:  Results from last 7 days   Lab Units 07/19/21  0415 07/19/21  0059 07/18/21  2312   TROPONIN I ng/mL <0 02 <0 02 <0 02     CBC with diff:   Results from last 7 days   Lab Units 07/19/21  0415 07/18/21  1815 07/15/21  1515   WBC Thousand/uL 5 96 9 40 7 19   HEMOGLOBIN g/dL 13 3 13 5 14 7   HEMATOCRIT % 38 7 39 0 44 0   MCV fL 93 94 95   PLATELETS Thousands/uL 211 252 273   MCH pg 32 0 32 6 31 6   MCHC g/dL 34 4 34 6 33 4   RDW % 11 8 11 9 11 9   MPV fL 9 9 10 1 10 2   NRBC AUTO /100 WBCs 0 0 0     CMP:  Results from last 7 days   Lab Units 07/19/21  0415 07/18/21  1815 07/15/21  1515   POTASSIUM mmol/L 4 4 3 9 3 5   CHLORIDE mmol/L 99* 101 102   CO2 mmol/L 26 23 27   BUN mg/dL 20 19 16   CREATININE mg/dL 1 14 1 02 1 25   CALCIUM mg/dL 8 6 8 5 8 8   AST U/L  --  16 19   ALT U/L  --  20 20   ALK PHOS U/L  --  89 106   EGFR ml/min/1 73sq m 73 83 65     BMP:  Results from last 7 days   Lab Units 07/19/21  0415 07/18/21  1815 07/15/21  1515   POTASSIUM mmol/L 4 4 3 9 3 5   CHLORIDE mmol/L 99* 101 102   CO2 mmol/L 26 23 27   BUN mg/dL 20 19 16   CREATININE mg/dL 1 14 1 02 1 25   CALCIUM mg/dL 8 6 8 5 8 8     Lab Results   Component Value Date    NTBNP 605 (H) 07/18/2021     Results from last 7 days   Lab Units 07/19/21  0415   MAGNESIUM mg/dL 1 7     Results from last 7 days   Lab Units 07/18/21  2312   HEMOGLOBIN A1C % 7 1*     Results from last 7 days   Lab Units 07/19/21  0415   TSH 3RD GENERATON uIU/mL 1 100         Lipid Profile:   No results found for: CHOL  Lab Results   Component Value Date    HDL 46 01/28/2020    HDL 40 04/23/2019    HDL 42 03/02/2016     Lab Results   Component Value Date    LDLCALC 144 (H) 01/28/2020    LDLCALC 131 (H) 04/23/2019    LDLCALC 150 (H) 03/02/2016     Lab Results   Component Value Date    TRIG 126 01/28/2020    TRIG 126 04/23/2019    TRIG 134 03/02/2016       Cardiac testing:   Results for orders placed during the hospital encounter of 03/25/20    Echo complete with contrast if indicated    [address]ia [address]  [address] [facility] [facility]  [phone]    Transthoracic Echocardiogram  2D, M-mode, Doppler, and Color Doppler    Study date:  25-Mar-2020    Patient: Jayson Royal  MR number: [id number]  Account number: [de-identified]  : 1965  Age: 48 years  Gender: Male  Status: Outpatient  Location: [address] [place] [address] and Vascular Center  Height: 69 in  Weight: 379 3 lb  BP: 142/ 96 mmHg    Indications: Hypertension, edema  Diagnoses: R60 9 - Edema, unspecified    Sonographer:  TOSHIA Ham  Primary Physician:  Hunter Bermudez MD  Referring Physician:  Hunter Bermudez MD  Group:  [facility]  Interpreting Physician:  Pedro Huff MD    SUMMARY    LEFT VENTRICLE:  The ventricle was mildly dilated  Systolic function was mildly to moderately reduced  Ejection fraction was estimated in the range of 40 % to 45 %  There were no regional wall motion abnormalities  There was an increased relative contribution of atrial contraction to ventricular filling  LEFT ATRIUM:  The atrium was mildly dilated  HISTORY: PRIOR HISTORY: Obesity, HTN, DM, asthma  PROCEDURE: The study was performed in the [address] [facility] [place]  This was a routine study  The transthoracic approach was used  The study included complete 2D imaging, M-mode, complete spectral Doppler, and color Doppler  The  heart rate was 78 bpm, at the start of the study  Intravenous contrast ( 1 6ml Definity in NSS) was administered to opacify the left ventricle  Echocardiographic views were limited due to decreased penetration  This was a technically  difficult study  LEFT VENTRICLE: The ventricle was mildly dilated  Systolic function was mildly to moderately reduced  Ejection fraction was estimated in the range of 40 % to 45 %  There were no regional wall motion abnormalities   Wall thickness was  normal  DOPPLER: There was an increased relative contribution of atrial contraction to ventricular filling  RIGHT VENTRICLE: The size was normal  Systolic function was normal     LEFT ATRIUM: The atrium was mildly dilated  RIGHT ATRIUM: Size was normal     MITRAL VALVE: DOPPLER: There was no significant regurgitation  AORTIC VALVE: The valve was trileaflet  Leaflets exhibited normal cuspal separation  TRICUSPID VALVE: The valve structure was normal  There was normal leaflet separation  DOPPLER: The transtricuspid velocity was within the normal range  There was no evidence for stenosis  There was no regurgitation  PULMONIC VALVE: Leaflets exhibited normal thickness, no calcification, and normal cuspal separation  DOPPLER: The transpulmonic velocity was within the normal range  There was no regurgitation  PERICARDIUM: There was no pericardial effusion  AORTA: The root exhibited upper limit of normal size  SYSTEMIC VEINS: IVC: The inferior vena cava was not well visualized  SYSTEM MEASUREMENT TABLES    2D  %FS: 15 85 %  Ao Diam: 3 99 cm  EDV(Teich): 187 99 ml  EF(Cube): 40 42 %  EF(Teich): 32 76 %  ESV(Cube): 136 25 ml  ESV(Teich): 126 4 ml  IVSd: 0 93 cm  LA Diam: 4 39 cm  LVIDd: 6 12 cm  LVIDs: 5 15 cm  LVPWd: 0 9 cm  SI(Cube): 33 98 ml/m2  SI(Teich): 22 64 ml/m2  SV(Cube): 92 43 ml  SV(Teich): 61 59 ml    CW  AV Env  Ti: 258 78 ms  AV VTI: 23 64 cm  AV Vmax: 1 22 m/s  AV Vmean: 0 91 m/s  AV maxP 99 mmHg  AV meanPG: 3 53 mmHg    MM  TAPSE: 2 21 cm    PW  E': 0 05 m/s  E/E': 13 98  HR: 77 29 BPM  LVOT Env  Ti: 310 54 ms  LVOT VTI: 15 86 cm  LVOT Vmax: 0 79 m/s  LVOT Vmean: 0 51 m/s  LVOT maxP 48 mmHg  LVOT meanP 24 mmHg  MV A Zach: 0 85 m/s  MV Dec Galax: 5 m/s2  MV DecT: 126 01 ms  MV E Zahc: 0 63 m/s  MV E/A Ratio: 0 74    [employer] Accredited Echocardiography Laboratory    Prepared and electronically signed by    Nile Madrigal MD  Signed 25-Mar-2020 14:46:33    No results found for this or any previous visit  No valid procedures specified  No results found for this or any previous visit  Imaging: I have personally reviewed pertinent reports  XR chest 1 view portable    Result Date: 7/15/2021  Narrative: CHEST INDICATION:   sob, cp  Smoker  COMPARISON:  6/25/2010 EXAM PERFORMED/VIEWS:  XR CHEST PORTABLE FINDINGS: Cardiomediastinal silhouette appears unremarkable  The lungs are clear  No pneumothorax or pleural effusion  Osseous structures appear within normal limits for patient age  Impression: No acute cardiopulmonary disease  Workstation performed: [id number]     XR chest 2 views    Result Date: 7/19/2021  Narrative: CHEST INDICATION:  Chest pain, shortness of breath  COMPARISON:  7/15/2031, CT chest 10/23/2008 EXAM PERFORMED/VIEWS:  XR CHEST PA & LATERAL  The frontal view was performed utilizing dual energy radiographic technique  Images: 4 FINDINGS: Cardiomediastinal silhouette appears unremarkable  The lungs are clear  No pneumothorax or pleural effusion  Osseous structures appear within normal limits for patient age  Impression: No acute cardiopulmonary disease  Workstation performed: [id number]     Assessment  Principal Problem: Moderate persistent asthma with acute exacerbation  Active Problems:    Irritable bowel syndrome with diarrhea    Essential hypertension    Type 2 diabetes mellitus with hyperglycemia (HCC)    Mixed hyperlipidemia    VICKY (obstructive sleep apnea)    Thank you for allowing us to participate in this patient's care  Counseling / Coordination of Care  Total floor / unit time spent today 45 minutes  Greater than 50% of total time was spent with the patient and / or family counseling and / or coordination of care  A description of the counseling / coordination of care: Review of history, current assessment, development of a plan      Code Status: Level 1 - Full Code    ** Please Note: Dragon 360 Dictation voice to text software may have been used in the creation of this document   **

## 2021-07-19 NOTE — ASSESSMENT & PLAN NOTE
ECHO: LVEF 40-45% in March 2020  Never seen by cardiology  On Lasix 20 mg daily by PCP     Cardiology following  Troponin negative   Update ECHO   Change Lasix to 40 mg IV daily  Monitor I+O, daily weight

## 2021-07-19 NOTE — PROGRESS NOTES
[address] [facility]  Progress Note - Rigoberto Wiggins 1965, 49 y o  male MRN: [id number]  Unit/Bed#: [facility] 401-01 Encounter: [id number]  Primary Care Provider: Margy Phipps MD   Date and time admitted to hospital: 7/18/2021  5:39 PM    * Moderate persistent asthma with acute exacerbation  Assessment & Plan  Presented to ED on 7/15 for SOB  Was discharged on Proventil and Prednisone  Presents with worsening SOB despite outpatient treatment   Pro- BNP is 605  CXR more indicative of air trapping and airspace disease  D dimer unremarkable   Continue Solumedrol 40 mg IV q6h, Albuterol nebulizer q4h PRN  Pulmonary following  Added Pulmicort 0 5 mg q12h   Will need outpatient pulmonary follow-up for PFTs and consideration for biologics     Cardiomyopathy [facility])  Assessment & Plan  ECHO: LVEF 40-45% in March 2020  Never seen by cardiology  On Lasix 20 mg daily by PCP  Cardiology following  Troponin negative   Update ECHO   Change Lasix to 40 mg IV daily  Monitor I+O, daily weight    Essential hypertension  Assessment & Plan  BP elevated on admission  Continue lisinopril 30 mg daily  Change Lasix to 40 mg IV daily  Monitor     VICKY (obstructive sleep apnea)  Assessment & Plan  Continue CPAP at bedtime  Mixed hyperlipidemia  Assessment & Plan  Continue Lipitor 10 mg daily  Type 2 diabetes mellitus with hyperglycemia [facility])  Assessment & Plan  Lab Results   Component Value Date    HGBA1C 7 1 (H) 07/18/2021     Recent Labs     07/19/21  0032 07/19/21  0613 07/19/21  1056   POCGLU 312* 347* 325*       Blood Sugar Average: Last 72 hrs:  (P) 328   Holding metformin and Januvia  Will start Lasix 8 units at HS  Monitor ACCU checks AC + HS  Diabetic diet     Irritable bowel syndrome with diarrhea  Assessment & Plan  Currently without much complaint  VTE Pharmacologic Prophylaxis:   Pharmacologic: Enoxaparin (Lovenox)  Mechanical VTE Prophylaxis in Place: Yes    Patient Centered Rounds:  I have performed bedside rounds with nursing staff today  Discussions with Specialists or Other Care Team Provider: nursing, pulmonary and cardiology notes appreciated     Education and Discussions with Family / Patient: I have answered all questions to the best of my ability  Time Spent for Care: 30 minutes  More than 50% of total time spent on counseling and coordination of care as described above  Current Length of Stay: 1 day(s)    Current Patient Status: Inpatient   Certification Statement: The patient will continue to require additional inpatient hospital stay due to asthma exacerbation, volume overload on IV Lasix     Discharge Plan: Patient is not medically stable for discharge today, likely in 24-48 hours pending progress  Code Status: Level 1 - Full Code      Subjective:   Patient found to be resting upright in bed on room air  Continues to hear himself wheeze  Continues with SOB on exertion  Overall, patient feeling slightly better  Denies HA, dizziness, CP, abdominal pain, N/V/D  Would like to shower  Objective:     Vitals:   Temp (24hrs), Av 2 °F (36 8 °C), Min:97 6 °F (36 4 °C), Max:99 1 °F (37 3 °C)    Temp:  [97 6 °F (36 4 °C)-99 1 °F (37 3 °C)] 97 6 °F (36 4 °C)  HR:  [67-96] 70  Resp:  [20-26] 20  BP: (177-198)/() 177/91  SpO2:  [92 %-95 %] 93 %  Body mass index is 47 5 kg/m²  Input and Output Summary (last 24 hours): Intake/Output Summary (Last 24 hours) at 2021 1322  Last data filed at 2021 1252  Gross per 24 hour   Intake --   Output 850 ml   Net -850 ml       Physical Exam:     Physical Exam  Vitals and nursing note reviewed  Constitutional:       General: He is not in acute distress  Appearance: He is well-developed  He is obese  He is not toxic-appearing or diaphoretic  Comments: On room air, speaking full sentences on room air    HENT:      Head: Normocephalic  Cardiovascular:      Rate and Rhythm: Normal rate and regular rhythm  Pulmonary:      Effort: Pulmonary effort is normal  No tachypnea or respiratory distress  Breath sounds: Examination of the right-lower field reveals wheezing  Examination of the left-lower field reveals wheezing  Decreased breath sounds and wheezing present  No rhonchi or rales  Abdominal:      General: Bowel sounds are normal  There is no distension  Palpations: Abdomen is soft  Tenderness: There is no abdominal tenderness  Musculoskeletal:         General: No tenderness  Normal range of motion  Cervical back: Normal range of motion  Right lower leg: Edema (trace) present  Left lower leg: Edema (trace) present  Skin:     General: Skin is warm and dry  Capillary Refill: Capillary refill takes less than 2 seconds  Neurological:      Mental Status: He is alert and oriented to person, place, and time  Mental status is at baseline  Psychiatric:         Mood and Affect: Mood normal          Behavior: Behavior normal          Thought Content: Thought content normal          Judgment: Judgment normal          Additional Data:     Labs:    Results from last 7 days   Lab Units 07/19/21  0415   WBC Thousand/uL 5 96   HEMOGLOBIN g/dL 13 3   HEMATOCRIT % 38 7   PLATELETS Thousands/uL 211   NEUTROS PCT % 93*   LYMPHS PCT % 5*   MONOS PCT % 1*   EOS PCT % 0     Results from last 7 days   Lab Units 07/19/21  0415 07/18/21  1815   POTASSIUM mmol/L 4 4 3 9   CHLORIDE mmol/L 99* 101   CO2 mmol/L 26 23   BUN mg/dL 20 19   CREATININE mg/dL 1 14 1 02   CALCIUM mg/dL 8 6 8 5   ALK PHOS U/L  --  89   ALT U/L  --  20   AST U/L  --  16           * I Have Reviewed All Lab Data Listed Above  * Additional Pertinent Lab Tests Reviewed:  All Labs Within Last 24 Hours Reviewed    Imaging:    Imaging Reports Reviewed Today Include: CXR  Imaging Personally Reviewed by Myself Includes:  None     Recent Cultures (last 7 days):           Last 24 Hours Medication List:   Current Facility-Administered Medications   Medication Dose Route Frequency Provider Last Rate    acetaminophen  650 mg Oral Q6H PRN Misha Smith MD      albuterol  2 5 mg Nebulization Q4H PRN Misha Smith MD      aluminum-magnesium hydroxide-simethicone  30 mL Oral Q6H PRN Misha Smith MD      atorvastatin  10 mg Oral After Yomaira Oneil MD      budesonide  0 5 mg Nebulization Q12H Umair Pearson MD      docusate sodium  100 mg Oral BID PRN Misha Smith MD      enoxaparin  40 mg Subcutaneous Daily Misha Smith MD     [facility] Smoker ON 7/20/2021] furosemide  20 mg Oral Daily RODRIGUE Urena      guaiFENesin  600 mg Oral BID Misha Smith MD      insulin glargine  8 Units Subcutaneous HS RODRIGUE Mello      insulin lispro  1-5 Units Subcutaneous HS Misha Smith MD      insulin lispro  2-12 Units Subcutaneous TID [facility] Mihsa Smith MD      levalbuterol  1 25 mg Nebulization TID Misha Smith MD      And    sodium chloride  3 mL Nebulization TID Misha Smith MD      lisinopril  30 mg Oral Daily Misha Smith MD      methylPREDNISolone sodium succinate  40 mg Intravenous Q6H [address] Misha Smith MD      montelukast  10 mg Oral HS Misha Smith MD      multivitamin stress formula  1 tablet Oral Daily Misha Smith MD      ondansetron  4 mg Intravenous Q6H PRN Misha Smith MD          Today, Patient Was Seen By: RODRIGUE Mello    ** Please Note: Dictation voice to text software may have been used in the creation of this document   **

## 2021-07-19 NOTE — ASSESSMENT & PLAN NOTE
Lab Results   Component Value Date    HGBA1C 7 4 (A) 05/14/2021     Would put on hold metformin and Januvia; check blood sugars before meals and before bedtime  Place patient on insulin sliding scale per protocol  No results for input(s): POCGLU in the last 72 hours      Blood Sugar Average: Last 72 hrs:

## 2021-07-19 NOTE — ASSESSMENT & PLAN NOTE
Patient is on Proventil as well as prednisone with not much improvement  Currently with wheezes and BNP is 605 with a chest x-ray that is more indicative of air trapping and airspace disease  Most likely is asthma with acute exacerbation than CHF  Will put on hold prednisone in favor of methylprednisolone 125 mg x 1 and to continue with 40 mg every 6 hours  Will also place patient on albuterol nebulizations  Guaifenesin 600 mg twice daily  Singulair to start tonight  Pulmonary consult regarding possible asthma although will also get Cardiology to see patient regarding essential hypertension and possible cardiac asthma  Although troponin is 0 02 will trend x3  VBG

## 2021-07-19 NOTE — ED ATTENDING ATTESTATION
7/18/2021  IAngella MD, saw and evaluated the patient  I have discussed the patient with the resident/non-physician practitioner and agree with the resident's/non-physician practitioner's findings, Plan of Care, and MDM as documented in the resident's/non-physician practitioner's note, except where noted  All available labs and Radiology studies were reviewed  I was present for key portions of any procedure(s) performed by the resident/non-physician practitioner and I was immediately available to provide assistance  At this point I agree with the current assessment done in the Emergency Department  I have conducted an independent evaluation of this patient a history and physical is as follows:    ED Course    62-year-old male history of asthma and recent diagnosis of a low ejection fraction on outpatient cardiac echo presents with shortness of breath  Patient previously seen for shortness of breath diagnosed with asthma exacerbation started on steroids and albuterol states symptoms have not improved despite outpatient treatment therapy  Denies any orthopnea PND or increased weight  Patient is on Lasix chronically for lower extremity edema  Vitals reviewed  Heart is regular rate rhythm  Lungs moderate wheezes heard throughout all lung fields no obvious JVD  Abdomen soft nontender nondistended normal bowel sounds  Extremities trace edema bilaterally    Impression: Shortness of breath history of asthma peripheral edema recent normal echo    Check cardiac evaluation BNP chest x-ray trial bronchodilators anticipate admission given failure of outpatient therapy        Critical Care Time  Procedures

## 2021-07-19 NOTE — RESPIRATORY THERAPY NOTE
RT Protocol Note  Melvina Bence 49 y o  male MRN: [id number]  Unit/Bed#: [facility] 401-01 Encounter: [id number]    Assessment    Principal Problem: Moderate persistent asthma with acute exacerbation  Active Problems:    Irritable bowel syndrome with diarrhea    Essential hypertension    Type 2 diabetes mellitus with hyperglycemia (HCC)    Mixed hyperlipidemia    VICKY (obstructive sleep apnea)      Home Pulmonary Medications:  Albuterol inhaler prn       Past Medical History:   Diagnosis Date    Acute gastritis without hemorrhage     last assessed 3/24/17    Asthma     Diabetes mellitus ([facility] [address] )     Gastric bypass status for obesity     Hyperlipidemia     Hypertension     Impaired fasting glucose     last assessed 5/10/17    Obesity     Viral gastroenteritis     last assessed 11/4/16     Social History     Socioeconomic History    Marital status: /Civil Union     Spouse name: None    Number of children: None    Years of education: None    Highest education level: None   Occupational History    None   Tobacco Use    Smoking status: Light Tobacco Smoker     Types: Cigars    Smokeless tobacco: Current User    Tobacco comment: rare   Vaping Use    Vaping Use: Never used   Substance and Sexual Activity    Alcohol use: Yes     Comment: social    Drug use: No    Sexual activity: None   Other Topics Concern    None   Social History Narrative    None     Social Determinants of Health     Financial Resource Strain:     Difficulty of Paying Living Expenses:    Food Insecurity:     Worried About Running Out of Food in the Last Year:     Ran Out of Food in the Last Year:    Transportation Needs:     Lack of Transportation (Medical):      Lack of Transportation (Non-Medical):    Physical Activity:     Days of Exercise per Week:     Minutes of Exercise per Session:    Stress:     Feeling of Stress :    Social Connections:     Frequency of Communication with Friends and Family:     Frequency of Social Gatherings with Friends and Family:     Attends Tenri[employer] Services:     Active Member of Clubs or Organizations:     Attends Club or Organization Meetings:     Marital Status:    Intimate Partner Violence:     Fear of Current or Ex-Partner:     Emotionally Abused:     Physically Abused:     Sexually Abused:        Subjective         Objective    Physical Exam:   Assessment Type: Pre-treatment  General Appearance: Alert, Awake  Respiratory Pattern: Dyspnea with exertion  Chest Assessment: Chest expansion symmetrical  Bilateral Breath Sounds: Expiratory wheezes  Cough: Dry, Non-productive  O2 Device: RA    Vitals:  Blood pressure (!) 182/102, pulse 67, temperature 99 1 °F (37 3 °C), temperature source Oral, resp  rate 20, height 6' 1" (1 854 m), weight (!) 163 kg (360 lb), SpO2 94 %  Imaging and other studies: I have personally reviewed pertinent reports  O2 Device: RA     Plan    Respiratory Plan: Moderate/Severe Distress pathway        Resp Comments: Pt assessed per resp protocol  Pt admitted for asthma exacerbation  Pt has an albuterol inhaler at home he uses as needed  Pt biltareal bs exp wheezes  SpO2 94% on RA  Q6 xop/atrovent ordered and prn albuterol UDN  Will conitnue to monitor per protocol

## 2021-07-19 NOTE — ASSESSMENT & PLAN NOTE
Lab Results   Component Value Date    HGBA1C 7 1 (H) 07/18/2021     Recent Labs     07/19/21  0032 07/19/21  0613 07/19/21  1056   POCGLU 312* 347* 325*       Blood Sugar Average: Last 72 hrs:  (P) 328   Holding metformin and Januvia  Will start Lasix 8 units at HS  Monitor ACCU checks AC + HS  Diabetic diet

## 2021-07-19 NOTE — CONSULTS
PULMONOLOGY CONSULT NOTE     Name: Twan Allen   Age & Sex: 49 y o  male   MRN: [id number]  Unit/Bed#: [facility] 401-01   Encounter: [id number]        Reason for consultation: SOB with persistent cough    Requesting physician: Keisha Goldsmith MD    Assessment & Plan:  1  Moderate persistent asthma in acute exacerbation   History of childhood asthma and psoriasis, allergy shots in the past   Elevated absolute eosinphils noted 7/15/21   D-dimer not elevated   Obtain peak flow    Current regimen:   · Albuterol 2 5 mg q4hr PRN  · Methylprednisolone 40 mg q6hr   · Add Pulmicort 0 5 mg q12hr    Outpatient follow up for PFTs and consider biologics     2  Decreased left ventricular systolic function   Troponin not elevated x3   IV Lasix per cardiology recommendation   Repeat echo   Outpatient follow up with cardiology    3  Obstructive sleep apnea   Compliant with CPAP     4  Hypertension   BP elevated   Takes Lisinopril 30 mg at home, continue   Switched from oral 20 mg to IV 40 mg Lasix    5  Hyperlipidemia   Continue Lipitor 10 mg    6  Type II diabetes    A1c is 7 1    Takes Januvia 100 mg at home, d/c per primary team       History of Present Illness   HPI:  Twan Allen is a 49 y o  male with PMHx of HF with EF 40-45%, obesity s/p gastric bypass in 2004, VICKY compliant with CPAP, asthma, psoriasis, HTN, HLD, and type 2 diabetes who presented to the ED on Sunday 7/18/21 due to worsening dyspnea with persistent productive cough and associated chest pain for 1 week  He came to the ED on Thursday 7/15/21 via EMS for dyspnea and chest tightness as he ran out of his albuterol inhaler  He was given a heart neb, steroids, and magnesium and clinically improved  He was discharged home with albuterol inhaler and told to f/u with PCP  He uses his albuterol inhaler 2-3 time daily  He denies fever, chills, sore throat, or congestion  He reports intermittent diarrhea with no nausea, vomiting, or constipation   He reports bilateral LE extremity edema and orthopnea  He had an echo in March 2020 which showed EF 40-45% and was supposed to see cardiology last week but did not show due to acute symptoms  He received his COVID vaccine in June 2021  Review of systems:  12 point review of systems was completed and was otherwise negative except as listed in HPI  Historical Information   Past Medical History:   Diagnosis Date    Acute gastritis without hemorrhage     last assessed 3/24/17    Asthma     [facility] mellitus ([facility] [address]     Gastric bypass status for obesity     Hyperlipidemia     Hypertension     Impaired fasting glucose     last assessed 5/10/17    Obesity     Viral gastroenteritis     last assessed 11/4/16     Past Surgical History:   Procedure Laterality Date    CARPAL TUNNEL RELEASE      bilateral    GASTRIC BYPASS  2004    with han en y    HERNIA REPAIR      ventral inscisional    TONSILLECTOMY       Family History   Problem Relation Age of Onset    Stroke Mother     Hypertension Father        Occupational History:   No notable exposures       Social History:   Social History     Tobacco Use   Smoking Status Light Tobacco Smoker    Types: Cigars   Smokeless Tobacco Current User   Tobacco Comment    rare         Meds/Allergies   Current Facility-Administered Medications   Medication Dose Route Frequency    acetaminophen (TYLENOL) tablet 650 mg  650 mg Oral Q6H PRN    albuterol inhalation solution 2 5 mg  2 5 mg Nebulization Q4H PRN    aluminum-magnesium hydroxide-simethicone (MYLANTA) oral suspension 30 mL  30 mL Oral Q6H PRN    atorvastatin (LIPITOR) tablet 10 mg  10 mg Oral After Dinner    docusate sodium (COLACE) capsule 100 mg  100 mg Oral BID PRN    enoxaparin (LOVENOX) subcutaneous injection 40 mg  40 mg Subcutaneous Daily    furosemide (LASIX) tablet 20 mg  20 mg Oral Daily    guaiFENesin (MUCINEX) 12 hr tablet 600 mg  600 mg Oral BID    insulin lispro (HumaLOG) 100 units/mL subcutaneous injection 1-5 Units  1-5 Units Subcutaneous HS    insulin lispro (HumaLOG) 100 units/mL subcutaneous injection 2-12 Units  2-12 Units Subcutaneous TID AC    levalbuterol (XOPENEX) inhalation solution 1 25 mg  1 25 mg Nebulization TID    And    sodium chloride 0 9 % inhalation solution 3 mL  3 mL Nebulization TID    lisinopril (ZESTRIL) tablet 30 mg  30 mg Oral Daily    methylPREDNISolone sodium succinate (Solu-MEDROL) injection 40 mg  40 mg Intravenous Q6H [facility]    montelukast (SINGULAIR) tablet 10 mg  10 mg Oral HS    multivitamin stress formula tablet 1 tablet  1 tablet Oral Daily    ondansetron (ZOFRAN) injection 4 mg  4 mg Intravenous Q6H PRN     Medications Prior to Admission   Medication    albuterol (ProAir HFA) 90 mcg/act inhaler    atorvastatin (LIPITOR) 10 mg tablet    Blood Glucose Monitoring Suppl (ONE TOUCH ULTRA 2) w/Device KIT    furosemide (LASIX) 20 mg tablet    glucose blood (ONE TOUCH ULTRA TEST) test strip    Januvia 100 MG tablet    lisinopril (ZESTRIL) 30 mg tablet    MULTIPLE VITAMINS-CALCIUM PO    ONETOUCH DELICA LANCETS 45R MISC    predniSONE 20 mg tablet    metFORMIN (GLUCOPHAGE) 500 mg tablet     Allergies   Allergen Reactions    Azithromycin Other (See Comments)     Shaky, uneasy feeling     Bactrim [Sulfamethoxazole-Trimethoprim] Other (See Comments)     shakiness       Vitals: Blood pressure (!) 180/89, pulse 77, temperature 99 1 °F (37 3 °C), temperature source Oral, resp  rate 20, height 6' 1" (1 854 m), weight (!) 163 kg (360 lb), SpO2 94 %  , Body mass index is 47 5 kg/m²  No intake or output data in the 24 hours ending 07/19/21 0825    Physical Exam  Vitals and nursing note reviewed  Constitutional:       Appearance: He is well-developed  He is obese  HENT:      Head: Normocephalic and atraumatic  Eyes:      Conjunctiva/sclera: Conjunctivae normal    Cardiovascular:      Rate and Rhythm: Normal rate and regular rhythm  Heart sounds: No murmur heard  Pulmonary:      Effort: Pulmonary effort is normal  No respiratory distress  Breath sounds: Wheezing present  Chest:      Chest wall: Tenderness present  Abdominal:      Palpations: Abdomen is soft  Tenderness: There is no abdominal tenderness  Musculoskeletal:      Cervical back: Neck supple  Right lower leg: Edema present  Left lower leg: Edema present  Skin:     General: Skin is warm and dry  Neurological:      Mental Status: He is alert  Labs: I have personally reviewed pertinent lab results  Laboratory and Diagnostics  Results from last 7 days   Lab Units 07/19/21  0415 07/18/21  1815 07/15/21  1515   WBC Thousand/uL 5 96 9 40 7 19   HEMOGLOBIN g/dL 13 3 13 5 14 7   HEMATOCRIT % 38 7 39 0 44 0   PLATELETS Thousands/uL 211 252 273   NEUTROS PCT % 93* 88* 63   MONOS PCT % 1* 4 9     Results from last 7 days   Lab Units 07/19/21 0415 07/18/21  1815 07/15/21  1515   SODIUM mmol/L 133* 134* 137   POTASSIUM mmol/L 4 4 3 9 3 5   CHLORIDE mmol/L 99* 101 102   CO2 mmol/L 26 23 27   ANION GAP mmol/L 8 10 8   BUN mg/dL 20 19 16   CREATININE mg/dL 1 14 1 02 1 25   CALCIUM mg/dL 8 6 8 5 8 8   GLUCOSE RANDOM mg/dL 361* 381* 169*   ALT U/L  --  20 20   AST U/L  --  16 19   ALK PHOS U/L  --  89 106   ALBUMIN g/dL  --  2 9* 3 4*   TOTAL BILIRUBIN mg/dL  --  0 41 0 63     Results from last 7 days   Lab Units 07/19/21  0415   MAGNESIUM mg/dL 1 7   PHOSPHORUS mg/dL 3 6           Results from last 7 days   Lab Units 07/19/21  0415 07/19/21  0059 07/18/21  2312 07/18/21  1815 07/15/21  1515   TROPONIN I ng/mL <0 02 <0 02 <0 02 <0 02 <0 02                     Results from last 7 days   Lab Units 07/19/21  0059   D-DIMER QUANTITATIVE ug/ml FEU 0 32             Imaging and other studies: I have personally reviewed pertinent reports  EKG, Pathology, and Other Studies: I have personally reviewed pertinent reports          Code Status: Level 1 - Full Code    VTE Pharmacologic Prophylaxis: Enoxaparin (Lovenox)  VTE Mechanical Prophylaxis: sequential compression device

## 2021-07-19 NOTE — PLAN OF CARE
Problem: Potential for Falls  Goal: Patient will remain free of falls  Description: INTERVENTIONS:  - Educate patient/family on patient safety including physical limitations  - Instruct patient to call for assistance with activity   - Consult OT/PT to assist with strengthening/mobility   - Keep Call bell within reach  - Keep bed low and locked with side rails adjusted as appropriate  - Keep care items and personal belongings within reach  - Initiate and maintain comfort rounds    - Apply yellow socks and bracelet for high fall risk patients  - Consider moving patient to room near nurses station  Outcome: Progressing     Problem: MOBILITY - ADULT  Goal: Maintain or return to baseline ADL function  Description: INTERVENTIONS:  - Educate patient/family on patient safety including physical limitations  - Instruct patient to call for assistance with activity   - Consult OT/PT to assist with strengthening/mobility   - Keep Call bell within reach  - Keep bed low and locked with side rails adjusted as appropriate  - Keep care items and personal belongings within reach    - Apply yellow socks and bracelet for high fall risk patients  - Consider moving patient to room near nurses station  Outcome: Progressing  Goal: Maintains/Returns to pre admission functional level  Description: INTERVENTIONS:  -  Assess patient's ability to carry out ADLs; assess patient's baseline for ADL function and identify physical deficits which impact ability to perform ADLs (bathing, care of mouth/teeth, toileting, grooming, dressing, etc )  - Assess/evaluate cause of self-care deficits   - Assess range of motion  - Assess patient's mobility; develop plan if impaired  - Assess patient's need for assistive devices and provide as appropriate  - Encourage maximum independence but intervene and supervise when necessary  - Involve family in performance of ADLs  - Assess for home care needs following discharge   - Consider OT consult to assist with ADL evaluation and planning for discharge  - Provide patient education as appropriate  Outcome: Progressing     Problem: PAIN - ADULT  Goal: Verbalizes/displays adequate comfort level or baseline comfort level  Description: Interventions:  - Encourage patient to monitor pain and request assistance  - Assess pain using appropriate pain scale  - Administer analgesics based on type and severity of pain and evaluate response  - Implement non-pharmacological measures as appropriate and evaluate response  - Consider cultural and social influences on pain and pain management  - Notify physician/advanced practitioner if interventions unsuccessful or patient reports new pain  Outcome: Progressing     Problem: INFECTION - ADULT  Goal: Absence or prevention of progression during hospitalization  Description: INTERVENTIONS:  - Assess and monitor for signs and symptoms of infection  - Monitor lab/diagnostic results  - Monitor all insertion sites, i e  indwelling lines, tubes, and drains  - Monitor endotracheal if appropriate and nasal secretions for changes in amount and color  - [place] appropriate cooling/warming therapies per order  - Administer medications as ordered  - Instruct and encourage patient and family to use good hand hygiene technique  - Identify and instruct in appropriate isolation precautions for identified infection/condition  Outcome: Progressing  Goal: Absence of fever/infection during neutropenic period  Description: INTERVENTIONS:  - Monitor WBC    Outcome: Progressing     Problem: SAFETY ADULT  Goal: Patient will remain free of falls  Description: INTERVENTIONS:  - Educate patient/family on patient safety including physical limitations  - Instruct patient to call for assistance with activity   - Consult OT/PT to assist with strengthening/mobility   - Keep Call bell within reach  - Keep bed low and locked with side rails adjusted as appropriate  - Keep care items and personal belongings within reach  - Initiate and maintain comfort rounds  - Make Fall Risk Sign visible to staff  - Offer Toileting every ours, in advance of need  - Initiate/Maintaalarm  - Obtain necessary fall risk management equipment:   - Apply yellow socks and bracelet for high fall risk patients  - Consider moving patient to room near nurses station  Outcome: Progressing  Goal: Maintain or return to baseline ADL function  Description: INTERVENTIONS:  - Educate patient/family on patient safety including physical limitations  - Instruct patient to call for assistance with activity   - Consult OT/PT to assist with strengthening/mobility   - Keep Call bell within reach  - Keep bed low and locked with side rails adjusted as appropriate  - Keep care items and personal belongings within reach  - Initiate and maintain comfort rounds  - Make Fall Risk Sign visible to staff  - Offer Toileting every  Hours, in advance of need  - Initiate/Maintainalarm  - Obtain necessary fall risk management equipment:  - Apply yellow socks and bracelet for high fall risk patients  - Consider moving patient to room near nurses station  Outcome: Progressing  Goal: Maintains/Returns to pre admission functional level  Description: INTERVENTIONS:  -  Assess patient's ability to carry out ADLs; assess patient's baseline for ADL function and identify physical deficits which impact ability to perform ADLs (bathing, care of mouth/teeth, toileting, grooming, dressing, etc )  - Assess/evaluate cause of self-care deficits   - Assess range of motion  - Assess patient's mobility; develop plan if impaired  - Assess patient's need for assistive devices and provide as appropriate  - Encourage maximum independence but intervene and supervise when necessary  - Involve family in performance of ADLs  - Assess for home care needs following discharge   - Consider OT consult to assist with ADL evaluation and planning for discharge  - Provide patient education as appropriate  Outcome: Progressing     Problem: DISCHARGE PLANNING  Goal: Discharge to home or other facility with appropriate resources  Description: INTERVENTIONS:  - Identify barriers to discharge w/patient and caregiver  - Arrange for needed discharge resources and transportation as appropriate  - Identify discharge learning needs (meds, wound care, etc )  - Arrange for interpretive services to assist at discharge as needed  - Refer to Case Management Department for coordinating discharge planning if the patient needs post-hospital services based on physician/advanced practitioner order or complex needs related to functional status, cognitive ability, or social support system  Outcome: Progressing     Problem: Knowledge Deficit  Goal: Patient/family/caregiver demonstrates understanding of disease process, treatment plan, medications, and discharge instructions  Description: Complete learning assessment and assess knowledge base    Interventions:  - Provide teaching at level of understanding  - Provide teaching via preferred learning methods  Outcome: Progressing

## 2021-07-19 NOTE — ASSESSMENT & PLAN NOTE
Presented to ED on 7/15 for SOB  Was discharged on Proventil and Prednisone   Presents with worsening SOB despite outpatient treatment   Pro- BNP is 605  CXR more indicative of air trapping and airspace disease  D dimer unremarkable   Continue Solumedrol 40 mg IV q6h, Albuterol nebulizer q4h PRN  Pulmonary following  Added Pulmicort 0 5 mg q12h   Will need outpatient pulmonary follow-up for PFTs and consideration for biologics

## 2021-07-20 LAB
ANION GAP SERPL CALCULATED.3IONS-SCNC: 8 MMOL/L (ref 4–13)
BUN SERPL-MCNC: 29 MG/DL (ref 5–25)
CALCIUM SERPL-MCNC: 8.8 MG/DL (ref 8.3–10.1)
CHLORIDE SERPL-SCNC: 95 MMOL/L (ref 100–108)
CO2 SERPL-SCNC: 29 MMOL/L (ref 21–32)
CREAT SERPL-MCNC: 1.13 MG/DL (ref 0.6–1.3)
GFR SERPL CREATININE-BSD FRML MDRD: 73 ML/MIN/1.73SQ M
GLUCOSE SERPL-MCNC: 192 MG/DL (ref 65–140)
GLUCOSE SERPL-MCNC: 198 MG/DL (ref 65–140)
GLUCOSE SERPL-MCNC: 336 MG/DL (ref 65–140)
GLUCOSE SERPL-MCNC: 346 MG/DL (ref 65–140)
GLUCOSE SERPL-MCNC: 430 MG/DL (ref 65–140)
MAGNESIUM SERPL-MCNC: 2.1 MG/DL (ref 1.6–2.6)
POTASSIUM SERPL-SCNC: 3.8 MMOL/L (ref 3.5–5.3)
SODIUM SERPL-SCNC: 132 MMOL/L (ref 136–145)

## 2021-07-20 PROCEDURE — 82948 REAGENT STRIP/BLOOD GLUCOSE: CPT

## 2021-07-20 PROCEDURE — 83735 ASSAY OF MAGNESIUM: CPT | Performed by: NURSE PRACTITIONER

## 2021-07-20 PROCEDURE — 94150 VITAL CAPACITY TEST: CPT

## 2021-07-20 PROCEDURE — 80048 BASIC METABOLIC PNL TOTAL CA: CPT | Performed by: NURSE PRACTITIONER

## 2021-07-20 PROCEDURE — 93306 TTE W/DOPPLER COMPLETE: CPT | Performed by: INTERNAL MEDICINE

## 2021-07-20 PROCEDURE — 94664 DEMO&/EVAL PT USE INHALER: CPT

## 2021-07-20 PROCEDURE — 99232 SBSQ HOSP IP/OBS MODERATE 35: CPT | Performed by: INTERNAL MEDICINE

## 2021-07-20 PROCEDURE — 99232 SBSQ HOSP IP/OBS MODERATE 35: CPT | Performed by: NURSE PRACTITIONER

## 2021-07-20 PROCEDURE — 94640 AIRWAY INHALATION TREATMENT: CPT

## 2021-07-20 PROCEDURE — 94760 N-INVAS EAR/PLS OXIMETRY 1: CPT

## 2021-07-20 RX ORDER — METHYLPREDNISOLONE SODIUM SUCCINATE 40 MG/ML
40 INJECTION, POWDER, LYOPHILIZED, FOR SOLUTION INTRAMUSCULAR; INTRAVENOUS EVERY 8 HOURS SCHEDULED
Status: DISCONTINUED | OUTPATIENT
Start: 2021-07-20 | End: 2021-07-20

## 2021-07-20 RX ORDER — FUROSEMIDE 40 MG/1
40 TABLET ORAL DAILY
Status: DISCONTINUED | OUTPATIENT
Start: 2021-07-21 | End: 2021-07-22 | Stop reason: HOSPADM

## 2021-07-20 RX ORDER — METHYLPREDNISOLONE SODIUM SUCCINATE 40 MG/ML
40 INJECTION, POWDER, LYOPHILIZED, FOR SOLUTION INTRAMUSCULAR; INTRAVENOUS EVERY 12 HOURS SCHEDULED
Status: DISCONTINUED | OUTPATIENT
Start: 2021-07-20 | End: 2021-07-21

## 2021-07-20 RX ORDER — INSULIN GLARGINE 100 [IU]/ML
15 INJECTION, SOLUTION SUBCUTANEOUS
Status: DISCONTINUED | OUTPATIENT
Start: 2021-07-20 | End: 2021-07-21

## 2021-07-20 RX ADMIN — INSULIN LISPRO 4 UNITS: 100 INJECTION, SOLUTION INTRAVENOUS; SUBCUTANEOUS at 09:30

## 2021-07-20 RX ADMIN — ATORVASTATIN CALCIUM 10 MG: 20 TABLET, FILM COATED ORAL at 17:10

## 2021-07-20 RX ADMIN — INSULIN LISPRO 4 UNITS: 100 INJECTION, SOLUTION INTRAVENOUS; SUBCUTANEOUS at 16:15

## 2021-07-20 RX ADMIN — INSULIN LISPRO 2 UNITS: 100 INJECTION, SOLUTION INTRAVENOUS; SUBCUTANEOUS at 16:15

## 2021-07-20 RX ADMIN — B-COMPLEX W/ C & FOLIC ACID TAB 1 TABLET: TAB at 08:22

## 2021-07-20 RX ADMIN — ISODIUM CHLORIDE 3 ML: 0.03 SOLUTION RESPIRATORY (INHALATION) at 08:01

## 2021-07-20 RX ADMIN — ENOXAPARIN SODIUM 40 MG: 40 INJECTION SUBCUTANEOUS at 08:22

## 2021-07-20 RX ADMIN — INSULIN LISPRO 4 UNITS: 100 INJECTION, SOLUTION INTRAVENOUS; SUBCUTANEOUS at 11:32

## 2021-07-20 RX ADMIN — ISODIUM CHLORIDE 3 ML: 0.03 SOLUTION RESPIRATORY (INHALATION) at 13:28

## 2021-07-20 RX ADMIN — BUDESONIDE 0.5 MG: 0.5 INHALANT ORAL at 19:00

## 2021-07-20 RX ADMIN — INSULIN GLARGINE 15 UNITS: 100 INJECTION, SOLUTION SUBCUTANEOUS at 21:14

## 2021-07-20 RX ADMIN — METHYLPREDNISOLONE SODIUM SUCCINATE 40 MG: 40 INJECTION, POWDER, FOR SOLUTION INTRAMUSCULAR; INTRAVENOUS at 05:22

## 2021-07-20 RX ADMIN — LISINOPRIL 30 MG: 20 TABLET ORAL at 08:22

## 2021-07-20 RX ADMIN — INSULIN LISPRO 8 UNITS: 100 INJECTION, SOLUTION INTRAVENOUS; SUBCUTANEOUS at 06:05

## 2021-07-20 RX ADMIN — GUAIFENESIN 600 MG: 600 TABLET, EXTENDED RELEASE ORAL at 17:10

## 2021-07-20 RX ADMIN — GUAIFENESIN 600 MG: 600 TABLET, EXTENDED RELEASE ORAL at 08:22

## 2021-07-20 RX ADMIN — BUDESONIDE 0.5 MG: 0.5 INHALANT ORAL at 08:01

## 2021-07-20 RX ADMIN — METHYLPREDNISOLONE SODIUM SUCCINATE 40 MG: 40 INJECTION, POWDER, FOR SOLUTION INTRAMUSCULAR; INTRAVENOUS at 21:14

## 2021-07-20 RX ADMIN — MONTELUKAST SODIUM 10 MG: 10 TABLET, FILM COATED ORAL at 21:14

## 2021-07-20 RX ADMIN — LEVALBUTEROL HYDROCHLORIDE 1.25 MG: 1.25 SOLUTION, CONCENTRATE RESPIRATORY (INHALATION) at 08:01

## 2021-07-20 RX ADMIN — LEVALBUTEROL HYDROCHLORIDE 1.25 MG: 1.25 SOLUTION, CONCENTRATE RESPIRATORY (INHALATION) at 19:00

## 2021-07-20 RX ADMIN — INSULIN LISPRO 1 UNITS: 100 INJECTION, SOLUTION INTRAVENOUS; SUBCUTANEOUS at 21:14

## 2021-07-20 RX ADMIN — FUROSEMIDE 20 MG: 20 TABLET ORAL at 08:21

## 2021-07-20 RX ADMIN — LEVALBUTEROL HYDROCHLORIDE 1.25 MG: 1.25 SOLUTION, CONCENTRATE RESPIRATORY (INHALATION) at 13:28

## 2021-07-20 RX ADMIN — INSULIN LISPRO 12 UNITS: 100 INJECTION, SOLUTION INTRAVENOUS; SUBCUTANEOUS at 11:31

## 2021-07-20 NOTE — PROGRESS NOTES
Progress Note - Pulmonary   Jace Meyer 49 y o  male MRN: [id number]  Unit/Bed#: [facility] 401-01 Encounter: [id number]      Assessment & Plan:  1  Moderate persistent asthma in acute exacerbation              History of childhood asthma and psoriasis, allergy shots in the past              Peak flow done on 7/19/21 was 270  Repeat today 7/20/21  Methylprednisolone 40 mg IV q12hr  Transition to oral tomorrow  Current regimen:   § Albuterol 2 5 mg neb q4hr PRN  § Pulmicort 0 5 mg neb q12hr     § Xopenex 1 25 mg neb q8hr    § Montelukast 10 mg po daily                      Outpatient pulmonary follow up for PFTs and consider biologics      2  Decreased left ventricular systolic function              Patient given one-time dose 40 mg IV Lasix on 7/19/21              Echo 3/2020 showed EF 40-45%   Echo 7/20/21 showed EF 50% with evidence of cardiomyopathy   Home dose Lasix increased to 40 mg po daily                Outpatient cardiology follow up and stress test in September     3  Obstructive sleep apnea              Compliant with CPAP                4  Hypertension              BP elevated              Continue Lisinopril 30 mg po and Lasix 40 mg po     5  Hyperlipidemia              Continue Lipitor 10 mg      6  Type II diabetes              Managed by primary team    Subjective:   HPI:  Jace Meyer is a 49 y o  male with PMHx of CHF, obesity s/p gastric bypass in 2004, VICKY compliant with CPAP, asthma, psoriasis, HTN, HLD, and type 2 diabetes who presented to the ED on Sunday 7/18/21 due to worsening dyspnea with persistent productive cough and associated chest pain for 1 week  He came to the ED on Thursday 7/15/21 via EMS for dyspnea and chest tightness as he ran out of his albuterol inhaler  He was given nebs, steroids, and magnesium and clinically improved  He was discharged home with albuterol inhaler and told to f/u with PCP  He uses his albuterol inhaler 2-3 time daily   He denies fever, chills, sore throat, or congestion  He reports intermittent diarrhea with no nausea, vomiting, or constipation  He reports bilateral LE extremity edema and orthopnea  He had an echo in March 2020 which showed EF 40-45%  He received his COVID vaccine in June 2021  This AM, the patient was sitting up in his bed doing a nebulizer treatment  He reports some improvement in his shortness of breath  He continues to have a chronic cough without sputum production  He denies fevers, chills, sore throat, or pleuritic chest pain  He denies dysuria, chest pain, or abdominal pain  He says he feels better following the nebulizer treatments  Review of Systems   Constitutional: Negative for chills and fever  HENT: Negative for ear pain and sore throat  Eyes: Negative for pain and visual disturbance  Respiratory: Positive for cough  Negative for shortness of breath  Cardiovascular: Negative for chest pain and palpitations  Gastrointestinal: Negative for abdominal pain and vomiting  Genitourinary: Negative for dysuria and hematuria  Musculoskeletal: Positive for back pain  Negative for arthralgias  Skin: Negative for color change and rash  Neurological: Negative for seizures and syncope  All other systems reviewed and are negative  Objective:     Vitals:    07/19/21 2248 07/20/21 0255 07/20/21 0705 07/20/21 0801   BP: 158/98 154/87 161/98    BP Location:       Pulse: 88 71 80    Resp: 18 18 14    Temp: (!) 97 4 °F (36 3 °C) 97 5 °F (36 4 °C) 97 8 °F (36 6 °C)    TempSrc:       SpO2: 95% 97% 95% 94%   Weight:       Height:               Intake/Output Summary (Last 24 hours) at 7/20/2021 0819  Last data filed at 7/20/2021 0753  Gross per 24 hour   Intake 1060 ml   Output 1200 ml   Net -140 ml         Physical Exam  Vitals and nursing note reviewed  Constitutional:       Appearance: He is well-developed  He is obese  HENT:      Head: Normocephalic and atraumatic     Eyes:      Conjunctiva/sclera: Conjunctivae normal  Cardiovascular:      Rate and Rhythm: Normal rate and regular rhythm  Heart sounds: No murmur heard  Pulmonary:      Effort: Pulmonary effort is normal  No respiratory distress  Breath sounds: Wheezing present  Comments: Diffuse bilateral wheezing in all lung fields  Abdominal:      Palpations: Abdomen is soft  Tenderness: There is no abdominal tenderness  Musculoskeletal:      Cervical back: Neck supple  Comments: Bilateral trace edema    Skin:     General: Skin is warm and dry  Neurological:      Mental Status: He is alert  Labs: I have personally reviewed pertinent lab results  Results from last 7 days   Lab Units 07/19/21  0415 07/18/21  1815 07/15/21  1515   WBC Thousand/uL 5 96 9 40 7 19   HEMOGLOBIN g/dL 13 3 13 5 14 7   HEMATOCRIT % 38 7 39 0 44 0   PLATELETS Thousands/uL 211 252 273   NEUTROS PCT % 93* 88* 63   MONOS PCT % 1* 4 9      Results from last 7 days   Lab Units 07/20/21  0521 07/19/21  0415 07/18/21  1815 07/15/21  1515   POTASSIUM mmol/L 3 8 4 4 3 9 3 5   CHLORIDE mmol/L 95* 99* 101 102   CO2 mmol/L 29 26 23 27   BUN mg/dL 29* 20 19 16   CREATININE mg/dL 1 13 1 14 1 02 1 25   CALCIUM mg/dL 8 8 8 6 8 5 8 8   ALK PHOS U/L  --   --  89 106   ALT U/L  --   --  20 20   AST U/L  --   --  16 19     Results from last 7 days   Lab Units 07/20/21  0521 07/19/21  0415   MAGNESIUM mg/dL 2 1 1 7     Results from last 7 days   Lab Units 07/19/21  0415   PHOSPHORUS mg/dL 3 6              0   Lab Value Date/Time    TROPONINI <0 02 07/19/2021 0415    TROPONINI <0 02 07/19/2021 0059    TROPONINI <0 02 07/18/2021 2312    TROPONINI <0 02 07/18/2021 1815    TROPONINI <0 02 07/15/2021 1515         Imaging and other studies: I have personally reviewed pertinent reports

## 2021-07-20 NOTE — ASSESSMENT & PLAN NOTE
ECHO: LVEF 40-45% in March 2020  Never seen by cardiology  On Lasix 20 mg daily by PCP  · Cardiology following  · Troponin negative   · Updated ECHO shows: LVEF 50%, akinesis of basal inferior and basal-mid inferolateral walls, G1DD, IVC was mildly dilated   · Given Lasix to 40 mg IV x1   · Cardiology up titrated home Lasix to 40 mg daily   · Outpatient Cardiology follow-up planned with possible stress testing at that point    · Monitor I+O, daily weight

## 2021-07-20 NOTE — PLAN OF CARE
Problem: Potential for Falls  Goal: Patient will remain free of falls  Description: INTERVENTIONS:  - Educate patient/family on patient safety including physical limitations  - Instruct patient to call for assistance with activity   - Consult OT/PT to assist with strengthening/mobility   - Keep Call bell within reach  - Keep bed low and locked with side rails adjusted as appropriate  - Keep care items and personal belongings within reach  - Initiate and maintain comfort rounds  - Make Fall Risk Sign visible to staff  - Offer Toileting every 4 Hours, in advance of need  - Initiate/Maintain alarm  - Obtain necessary fall risk management equipment:   - Apply yellow socks and bracelet for high fall risk patients  - Consider moving patient to room near nurses station  Outcome: Progressing

## 2021-07-20 NOTE — ASSESSMENT & PLAN NOTE
Presented to ED on 7/15 for SOB  Was discharged on Proventil and Prednisone   Presents with worsening SOB despite outpatient treatment   D dimer unremarkable   Pulmonary following  Transitioned to po steroids  Cont pulmon toilet, pulm inhalers  Cont peak flow monitoring  Will need outpatient pulmonary follow-up for PFTs and consideration for biologics   Check ambulatory pulse ox prior to discharge

## 2021-07-20 NOTE — ASSESSMENT & PLAN NOTE
Lab Results   Component Value Date    HGBA1C 7 1 (H) 07/18/2021     Recent Labs     07/19/21  1632 07/19/21  2030 07/20/21  0549 07/20/21  1047   POCGLU 358* 312* 336* 430*       Blood Sugar Average: Last 72 hrs:  (P) 345 [id number]   · Holding metformin and Januvia  · Now with steroid induced hyperglycemia   · Titrate up Lantus and Lispro   · Anticipate improvement with decreased Solumedrol dose   · Monitor ACCU checks AC + HS  · Diabetic diet

## 2021-07-20 NOTE — PROGRESS NOTES
General Cardiology   Progress Note -  Team One   Galileo Clark 49 y o  male MRN: [id number]    Unit/Bed#: [facility] 401-01 Encounter: [id number]    Assessment/ Plan:    1  Shortness of breath: Likely due to his acute asthma exacerbation; this is being managed by pulmonary; he feels a little better today  Repeat echo here showed low normal LVEF 50%; he did have good urine OP with one dose of IV lasix; will change his home dose to 40mg daily  2  Cardiomyopathy: LVEF 40-45% on echo 3/2020; repeat echo here showing LVEF 50% with akinesis of basal inferior and basal-mid inferolateral walls and grade 1 DD    He remains chest pain free  EKG without ischemic changes  Trop negative  Telemetry unrevealing  Reviewed results with patient; as above will increase lasix to 40mg daily  Recommend OP stress test to assess for underlying CAD with risk factors of family hx, obesity, HLD and DM  He should be fully recovered from his asthma exacerbation prior to this  OP follow up with Dr Jane Crespo arranged for 9/3/2021; stress test can be ordered at that time  3  Asthma: with acute exacerbation: pulmonary following; on IV steriods and nebs; some improvement; he is still wheezing on exam  4  HTN: BP elevated here; reports readings at home of 130-140s/80s; monitor  5  HLD: continue statin  6  DM Type II: Aic 7 1; defer management to primary team  7  VICKY: reports compliance with Cpap  8  Obesity: wt loss/lifestyle modifications recommended       OP cardiology follow up arranged for 9/3/2021  Cardiology will sign off, please call with questions       Subjective: Pt seen for follow up  No events overnight  He reports feeling a bit better today  Less coughing/sob and wheezing  Review of Systems   Constitutional: Negative for decreased appetite and fever  Cardiovascular: Positive for leg swelling and orthopnea  Negative for chest pain, irregular heartbeat, palpitations and syncope     Respiratory: Positive for cough, shortness of breath, sleep disturbances due to breathing and wheezing  Negative for sputum production  Gastrointestinal: Negative for bloating, abdominal pain, nausea and vomiting  Genitourinary: Negative for dysuria  Neurological: Negative for dizziness and light-headedness  Psychiatric/Behavioral: Negative for altered mental status  All other systems reviewed and are negative  Objective:   Vitals: Blood pressure 161/98, pulse 80, temperature 97 8 °F (36 6 °C), resp  rate 14, height 6' 1" (1 854 m), weight (!) 163 kg (360 lb), SpO2 94 %  ,     Body mass index is 47 5 kg/m²  ,     Systolic (17PZR), BOT:952 , Min:154 , XUM:164     Diastolic (78AUH), RRO:09, Min:87, Max:98      Intake/Output Summary (Last 24 hours) at 2021 0939  Last data filed at 2021 0753  Gross per 24 hour   Intake 1060 ml   Output 1200 ml   Net -140 ml     Weight (last 2 days)     Date/Time   Weight    21 1739   (!) 163 (360)            Telemetry Review:   Sinus rhythm, no significant events    Physical Exam  Vitals reviewed  Constitutional:       Appearance: Normal appearance  He is obese  HENT:      Head: Normocephalic  Mouth/Throat:      Mouth: Mucous membranes are moist    Cardiovascular:      Rate and Rhythm: Normal rate and regular rhythm  Heart sounds: S1 normal and S2 normal  No murmur heard  Pulmonary:      Effort: Pulmonary effort is normal       Breath sounds: Wheezing present  Abdominal:      Palpations: Abdomen is soft  Musculoskeletal:      Right lower le+ Edema present  Left lower le+ Edema present  Skin:     General: Skin is warm and dry  Neurological:      Mental Status: He is alert and oriented to person, place, and time     Psychiatric:         Mood and Affect: Mood normal        LABORATORY RESULTS  Results from last 7 days   Lab Units 21  0415 21  0059 21  2312   TROPONIN I ng/mL <0 02 <0 02 <0 02     CBC with diff:   Results from last 7 days   Lab Units 07/19/21  0415 07/18/21  1815 07/15/21  1515   WBC Thousand/uL 5 96 9 40 7 19   HEMOGLOBIN g/dL 13 3 13 5 14 7   HEMATOCRIT % 38 7 39 0 44 0   MCV fL 93 94 95   PLATELETS Thousands/uL 211 252 273   MCH pg 32 0 32 6 31 6   MCHC g/dL 34 4 34 6 33 4   RDW % 11 8 11 9 11 9   MPV fL 9 9 10 1 10 2   NRBC AUTO /100 WBCs 0 0 0     CMP:  Results from last 7 days   Lab Units 07/20/21  0521 07/19/21 0415 07/18/21  1815 07/15/21  1515   POTASSIUM mmol/L 3 8 4 4 3 9 3 5   CHLORIDE mmol/L 95* 99* 101 102   CO2 mmol/L 29 26 23 27   BUN mg/dL 29* 20 19 16   CREATININE mg/dL 1 13 1 14 1 02 1 25   CALCIUM mg/dL 8 8 8 6 8 5 8 8   AST U/L  --   --  16 19   ALT U/L  --   --  20 20   ALK PHOS U/L  --   --  89 106   EGFR ml/min/1 73sq m 73 73 83 65     BMP:  Results from last 7 days   Lab Units 07/20/21  0521 07/19/21 0415 07/18/21  1815 07/15/21  1515   POTASSIUM mmol/L 3 8 4 4 3 9 3 5   CHLORIDE mmol/L 95* 99* 101 102   CO2 mmol/L 29 26 23 27   BUN mg/dL 29* 20 19 16   CREATININE mg/dL 1 13 1 14 1 02 1 25   CALCIUM mg/dL 8 8 8 6 8 5 8 8     Lab Results   Component Value Date    NTBNP 605 (H) 07/18/2021     Results from last 7 days   Lab Units 07/20/21  0521 07/19/21  0415   MAGNESIUM mg/dL 2 1 1 7     Results from last 7 days   Lab Units 07/18/21  2312   HEMOGLOBIN A1C % 7 1*     Results from last 7 days   Lab Units 07/19/21  0415   TSH 3RD GENERATON uIU/mL 1 100     Lipid Profile:   No results found for: CHOL  Lab Results   Component Value Date    HDL 46 01/28/2020    HDL 40 04/23/2019    HDL 42 03/02/2016     Lab Results   Component Value Date    LDLCALC 144 (H) 01/28/2020    LDLCALC 131 (H) 04/23/2019    LDLCALC 150 (H) 03/02/2016     Lab Results   Component Value Date    TRIG 126 01/28/2020    TRIG 126 04/23/2019    TRIG 134 03/02/2016     Cardiac testing:   Results for orders placed during the hospital encounter of 07/18/21    Echo complete with contrast if indicated    Narrative  [facility] [phone] [place] [address]  [phone]    Transthoracic Echocardiogram  2D, M-mode, Doppler, and Color Doppler    Study date:  2021    Patient: Jayson Royal  MR number: [id number]  Account number: [de-identified]  : 1965  Age: 49 years  Gender: Male  Status: Inpatient  Location: Bedside  Height: 73 in  Weight: 359 3 lb  BP: 177/ 91 mmHg    Indications: Assess congestive heart failure  Diagnoses: I42 9 - Cardiomyopathy, unspecified    Sonographer:  TOSHIA Iqbal  Primary Physician:  Hunter Bermudez MD  Group:  [facility]  Cardiology Fellow:  Ally Blackman  Interpreting Physician:  Cece Villavicencio MD    SUMMARY    LEFT VENTRICLE:  Size was at the upper limits of normal   Systolic function was at the lower limits of normal  Ejection fraction was estimated to be 50 %  There were no regional wall motion abnormalities  There was akinesis of the basal inferior and basal-mid inferolateral wall(s)  There was no evidence of concentric hypertrophy  Doppler parameters were consistent with abnormal left ventricular relaxation (grade 1 diastolic dysfunction)  LEFT ATRIUM:  The atrium was mildly dilated  MITRAL VALVE:  There was mild annular calcification  There was trace regurgitation  TRICUSPID VALVE:  There was trace regurgitation  IVC, HEPATIC VEINS:  The inferior vena cava was mildly dilated  PROCEDURE: The procedure was performed at the bedside  This was a routine study  The transthoracic approach was used  The study included complete 2D imaging, M-mode, complete spectral Doppler, and color Doppler  The heart rate was 87 bpm,  at the start of the study  Images were obtained from the parasternal, apical, subcostal, and suprasternal notch acoustic windows  Echocardiographic views were limited due to restricted patient mobility, decreased penetration, and lung  interference  This was a technically difficult study      LEFT VENTRICLE: Size was at the upper limits of normal  Systolic function was at the lower limits of normal  Ejection fraction was estimated to be 50 %  There were no regional wall motion abnormalities  There was akinesis of the basal  inferior and basal-mid inferolateral wall(s)  Wall thickness was normal  There was no evidence of concentric hypertrophy  DOPPLER: Doppler parameters were consistent with abnormal left ventricular relaxation (grade 1 diastolic dysfunction)  RIGHT VENTRICLE: The size was normal  Systolic function was normal  Wall thickness was normal     LEFT ATRIUM: The atrium was mildly dilated  RIGHT ATRIUM: Size was normal     MITRAL VALVE: There was mild annular calcification  Valve structure was normal  There was normal leaflet separation  DOPPLER: The transmitral velocity was within the normal range  There was no evidence for stenosis  There was trace  regurgitation  AORTIC VALVE: The valve was trileaflet  Leaflets exhibited mildly increased thickness, mild calcification, and normal cuspal separation  DOPPLER: Transaortic velocity was within the normal range  There was no evidence for stenosis  There  was no significant regurgitation  TRICUSPID VALVE: The valve structure was normal  There was normal leaflet separation  DOPPLER: The transtricuspid velocity was within the normal range  There was no evidence for stenosis  There was trace regurgitation  PULMONIC VALVE: Leaflets exhibited normal thickness, no calcification, and normal cuspal separation  DOPPLER: The transpulmonic velocity was within the normal range  There was no significant regurgitation  PERICARDIUM: There was no pericardial effusion  The pericardium was normal in appearance  AORTA: The root exhibited normal size  The ascending aorta was normal in size  SYSTEMIC VEINS: IVC: The inferior vena cava was mildly dilated   Respirophasic changes were normal     SYSTEM MEASUREMENT TABLES    2D  %FS: 21 53 %  Ao Diam: 3 19 cm  Ao asc: 3 32 cm  EDV(Teich): 188 84 ml  EF(Teich): 42 83 %  ESV(Teich): 107 95 ml  IVSd: 0 8 cm  LA Area: 19 94 cm2  LA Diam: 4 66 cm  LVEDV MOD A4C: 126 08 ml  LVEF MOD A4C: 46 23 %  LVESV MOD A4C: 67 8 ml  LVIDd: 6 13 cm  LVIDs: 4 81 cm  LVLd A4C: 8 23 cm  LVLs A4C: 7 32 cm  LVPWd: 0 78 cm  RA Area: 20 73 cm2  RVIDd: 3 74 cm  SV MOD A4C: 58 28 ml  SV(Teich): 80 89 ml    MM  TAPSE: 2 77 cm    PW  E' Sept: 0 08 m/s  E/E' Sept: 9 01  MV A Zach: 0 84 m/s  MV Dec Kimble: 3 3 m/s2  MV DecT: 228 2 ms  MV E Zach: 0 75 m/s  MV E/A Ratio: 0 89  MV PHT: 66 18 ms  MVA By PHT: 3 32 cm2    [address] Accredited Echocardiography [facility]    Prepared and electronically signed by    Mason Mckeon MD  Signed 20-Jul-2021 07:13:15    No results found for this or any previous visit  No results found for this or any previous visit  No valid procedures specified  No results found for this or any previous visit      Meds/Allergies   current meds:   Current Facility-Administered Medications   Medication Dose Route Frequency    acetaminophen (TYLENOL) tablet 650 mg  650 mg Oral Q6H PRN    albuterol inhalation solution 2 5 mg  2 5 mg Nebulization Q4H PRN    aluminum-magnesium hydroxide-simethicone (MYLANTA) oral suspension 30 mL  30 mL Oral Q6H PRN    atorvastatin (LIPITOR) tablet 10 mg  10 mg Oral After Dinner    budesonide (PULMICORT) inhalation solution 0 5 mg  0 5 mg Nebulization Q12H    docusate sodium (COLACE) capsule 100 mg  100 mg Oral BID PRN    enoxaparin (LOVENOX) subcutaneous injection 40 mg  40 mg Subcutaneous Daily    [START ON 7/21/2021] furosemide (LASIX) tablet 40 mg  40 mg Oral Daily    guaiFENesin (MUCINEX) 12 hr tablet 600 mg  600 mg Oral BID    insulin glargine (LANTUS) subcutaneous injection 15 Units 0 15 mL  15 Units Subcutaneous HS    insulin lispro (HumaLOG) 100 units/mL subcutaneous injection 1-5 Units  1-5 Units Subcutaneous HS    insulin lispro (HumaLOG) 100 units/mL subcutaneous injection 2-12 Units  2-12 Units Subcutaneous TID AC    insulin lispro (HumaLOG) 100 units/mL subcutaneous injection 4 Units  4 Units Subcutaneous TID With Meals    levalbuterol (XOPENEX) inhalation solution 1 25 mg  1 25 mg Nebulization TID    And    sodium chloride 0 9 % inhalation solution 3 mL  3 mL Nebulization TID    lisinopril (ZESTRIL) tablet 30 mg  30 mg Oral Daily    methylPREDNISolone sodium succinate (Solu-MEDROL) injection 40 mg  40 mg Intravenous Q8H [address]    montelukast (SINGULAIR) tablet 10 mg  10 mg Oral HS    multivitamin stress formula tablet 1 tablet  1 tablet Oral Daily    ondansetron (ZOFRAN) injection 4 mg  4 mg Intravenous Q6H PRN     Medications Prior to Admission   Medication    albuterol (ProAir HFA) 90 mcg/act inhaler    atorvastatin (LIPITOR) 10 mg tablet    Blood Glucose Monitoring Suppl (ONE TOUCH ULTRA 2) w/Device KIT    furosemide (LASIX) 20 mg tablet    glucose blood (ONE TOUCH ULTRA TEST) test strip    Januvia 100 MG tablet    lisinopril (ZESTRIL) 30 mg tablet    MULTIPLE VITAMINS-CALCIUM PO    ONETOUCH DELICA LANCETS 08X MISC    [] predniSONE 20 mg tablet    metFORMIN (GLUCOPHAGE) 500 mg tablet     Assessment:  Principal Problem: Moderate persistent asthma with acute exacerbation  Active Problems:    Irritable bowel syndrome with diarrhea    Essential hypertension    Type 2 diabetes mellitus with hyperglycemia (HCC)    Mixed hyperlipidemia    VICKY (obstructive sleep apnea)    Cardiomyopathy ([facility] )    Counseling / Coordination of Care  Total floor / unit time spent today 20 minutes  Greater than 50% of total time was spent with the patient and / or family counseling and / or coordination of care  ** Please Note: Dragon 360 Dictation voice to text software may have been used in the creation of this document   **

## 2021-07-20 NOTE — PROGRESS NOTES
[facility]  Progress Note - Delnitine Flavors 1965, 49 y o  male MRN: [id number]  Unit/Bed#: [facility] 401-01 Encounter: [id number]  Primary Care Provider: Tera Nielson MD   Date and time admitted to hospital: 7/18/2021  5:39 PM    * Moderate persistent asthma with acute exacerbation  Assessment & Plan  Presented to ED on 7/15 for SOB  Was discharged on Proventil and Prednisone  Presents with worsening SOB despite outpatient treatment   Pro- BNP is 605  CXR more indicative of air trapping and airspace disease  D dimer unremarkable   · Started Solumedrol 40 mg IV q6h, Xopenex TID, Albuterol nebulizer q4h PRN, and Pulmicort 0 5 mg q12h   · Plan to decrease Solumedrol to 40 mg IV q 12h with transition to Prednisone in preparation for discharge tomorrow   · Will need outpatient pulmonary follow-up for PFTs and consideration for biologics     Cardiomyopathy [facility])  Assessment & Plan  ECHO: LVEF 40-45% in March 2020  Never seen by cardiology  On Lasix 20 mg daily by PCP     · Cardiology following  · Troponin negative   · Updated ECHO shows: LVEF 50%, akinesis of basal inferior and basal-mid inferolateral walls, G1DD, IVC was mildly dilated   · Given Lasix to 40 mg IV x1   · Cardiology up titrated home Lasix to 40 mg daily   · Monitor I+O, daily weight    Type 2 diabetes mellitus with hyperglycemia [facility])  Assessment & Plan  Lab Results   Component Value Date    HGBA1C 7 1 (H) 07/18/2021     Recent Labs     07/19/21  1632 07/19/21  2030 07/20/21  0549 07/20/21  1047   POCGLU 358* 312* 336* 430*       Blood Sugar Average: Last 72 hrs:  (P) 345 [id number]   · Holding metformin and Januvia  · Now with steroid induced hyperglycemia   · Increased Lantus from 8 units to 15 at HS and add Lispro 4 units TID with meals  · Anticipate improvement with decreased Solumedrol dose   · Monitor ACCU checks AC + HS  · Diabetic diet     Essential hypertension  Assessment & Plan  BP elevated on admission  Continue lisinopril 30 mg daily  Up titrate Lasix to 40 mg po daily   Monitor     VICKY (obstructive sleep apnea)  Assessment & Plan  Continue CPAP at bedtime  Mixed hyperlipidemia  Assessment & Plan  Continue Lipitor 10 mg daily  Irritable bowel syndrome with diarrhea  Assessment & Plan  Currently without much complaint  VTE Pharmacologic Prophylaxis:   Pharmacologic: Enoxaparin (Lovenox)  Mechanical VTE Prophylaxis in Place: Yes    Patient Centered Rounds: I have performed bedside rounds with nursing staff today  Discussions with Specialists or Other Care Team Provider: nursing, pulmonary and cardiology notes appreciated     Education and Discussions with Family / Patient: I have answered all questions to the best of my ability  Time Spent for Care: 30 minutes  More than 50% of total time spent on counseling and coordination of care as described above  Current Length of Stay: 2 day(s)    Current Patient Status: Inpatient   Certification Statement: The patient will continue to require additional inpatient hospital stay due to asthma exacerbation on IV steroids     Discharge Plan: Patient is not medically stable for discharge today, likely in 24-48 hours pending progress  Case management aware patient will need a nebulizer  Code Status: Level 1 - Full Code      Subjective:   Patient resting comfortably in bed  Continues to hear himself wheeze  Denies HA, dizziness, CP  Wants to ambulate the hallway with staff  Overall, feeling better and would like to discharge home tomorrow  Objective:     Vitals:   Temp (24hrs), Av 8 °F (36 6 °C), Min:97 4 °F (36 3 °C), Max:98 5 °F (36 9 °C)    Temp:  [97 4 °F (36 3 °C)-98 5 °F (36 9 °C)] 97 8 °F (36 6 °C)  HR:  [69-89] 89  Resp:  [13-20] 13  BP: (154-175)/(82-98) 163/82  SpO2:  [90 %-97 %] 90 %  Body mass index is 47 5 kg/m²  Input and Output Summary (last 24 hours):        Intake/Output Summary (Last 24 hours) at 2021 1507  Last data filed at 7/20/2021 1200  Gross per 24 hour   Intake 1178 ml   Output 750 ml   Net 428 ml       Physical Exam:     Physical Exam  Vitals and nursing note reviewed  Constitutional:       General: He is not in acute distress  Appearance: He is well-developed  He is obese  He is not toxic-appearing or diaphoretic  HENT:      Head: Normocephalic  Cardiovascular:      Rate and Rhythm: Normal rate and regular rhythm  Pulmonary:      Effort: Pulmonary effort is normal  No tachypnea or respiratory distress  Breath sounds: Examination of the right-lower field reveals wheezing  Examination of the left-lower field reveals wheezing  Decreased breath sounds and wheezing present  No rhonchi or rales  Abdominal:      General: Bowel sounds are normal  There is no distension  Palpations: Abdomen is soft  Tenderness: There is no abdominal tenderness  Musculoskeletal:         General: No tenderness  Normal range of motion  Cervical back: Normal range of motion  Skin:     General: Skin is warm and dry  Findings: No rash  Neurological:      Mental Status: He is alert and oriented to person, place, and time  Psychiatric:         Judgment: Judgment normal          Additional Data:     Labs:    Results from last 7 days   Lab Units 07/19/21  0415   WBC Thousand/uL 5 96   HEMOGLOBIN g/dL 13 3   HEMATOCRIT % 38 7   PLATELETS Thousands/uL 211   NEUTROS PCT % 93*   LYMPHS PCT % 5*   MONOS PCT % 1*   EOS PCT % 0     Results from last 7 days   Lab Units 07/20/21  0521 07/18/21  1815   POTASSIUM mmol/L 3 8 3 9   CHLORIDE mmol/L 95* 101   CO2 mmol/L 29 23   BUN mg/dL 29* 19   CREATININE mg/dL 1 13 1 02   CALCIUM mg/dL 8 8 8 5   ALK PHOS U/L  --  89   ALT U/L  --  20   AST U/L  --  16           * I Have Reviewed All Lab Data Listed Above  * Additional Pertinent Lab Tests Reviewed:  All Labs Within Last 24 Hours Reviewed    Imaging:    Imaging Reports Reviewed Today Include: CXR  Imaging Personally Reviewed by Myself Includes:  None     Recent Cultures (last 7 days):           Last 24 Hours Medication List:   Current Facility-Administered Medications   Medication Dose Route Frequency Provider Last Rate    acetaminophen  650 mg Oral Q6H PRN Yocasta Way MD      albuterol  2 5 mg Nebulization Q4H PRN Yocasta Way MD      aluminum-magnesium hydroxide-simethicone  30 mL Oral Q6H PRN Yocasta Way MD      atorvastatin  10 mg Oral After Shaheed Conley MD      budesonide  0 5 mg Nebulization Q12H Devi Medel MD      docusate sodium  100 mg Oral BID PRN Yocasta Way MD      enoxaparin  40 mg Subcutaneous Daily Yocasta Way MD      [START ON 7/21/2021] furosemide  40 mg Oral Daily RODRIGUE Dorman      guaiFENesin  600 mg Oral BID Yocasta Way MD      insulin glargine  15 Units Subcutaneous HS RODRIGUE Glover      insulin lispro  1-5 Units Subcutaneous HS Yocasta Way MD      insulin lispro  2-12 Units Subcutaneous TID [facility] Yocasta Way MD      insulin lispro  4 Units Subcutaneous TID With Meals RODRIGUE Glover      levalbuterol  1 25 mg Nebulization TID Yocasta Way MD      And    sodium chloride  3 mL Nebulization TID Yocasta Way MD      lisinopril  30 mg Oral Daily Yocasta Way MD      methylPREDNISolone sodium succinate  40 mg Intravenous Q12H [address] Devi Medel MD      montelukast  10 mg Oral HS Yocasta Way MD      multivitamin stress formula  1 tablet Oral Daily Yocasta Way MD      ondansetron  4 mg Intravenous Q6H PRN Yocasta Way MD          Today, Patient Was Seen By: RODRIGUE Glover    ** Please Note: Dictation voice to text software may have been used in the creation of this document   **

## 2021-07-20 NOTE — UTILIZATION REVIEW
Inpatient Admission Authorization Request   NOTIFICATION OF INPATIENT ADMISSION/INPATIENT AUTHORIZATION REQUEST   SERVICING FACILITY:   [facility]  Address: [address] [place] [address], [address] [place], [place] [id number]  Tax ID: [id number]  NPI: [id number]  Place of Service: Inpatient [address] [facility] Code: 24     ATTENDING PROVIDER:  Attending Name and NPI#: Alexandra Bhakta [[id number]]  Address: [address] [place] [address], [address] [place] [address]  Phone: [phone]     UTILIZATION REVIEW CONTACT:  [facility] Utilization   Network Utilization Review Department  Phone: [phone]  Fax: [phone]  Email: [email or web address]     PHYSICIAN ADVISORY SERVICES:  FOR TCOQ-DS-MYSN REVIEW - MEDICAL NECESSITY DENIAL  Phone: [phone]  Fax: [phone]  Email: [email or web address]     TYPE OF REQUEST:  Inpatient Status     ADMISSION INFORMATION:  ADMISSION DATE/TIME: 7/18/21  8:36 PM  PATIENT DIAGNOSIS CODE/DESCRIPTION:  Shortness of breath [R06 02]  CHF (congestive heart failure) (HCC) [I50 9]  Chest pain [R07 9]  SOB (shortness of breath) [R06 02]  Decreased left ventricular systolic function [V45 1]  Moderate persistent asthma with acute exacerbation [J45 41]  DISCHARGE DATE/TIME: No discharge date for patient encounter  DISCHARGE DISPOSITION (IF DISCHARGED): Home/Self Care     IMPORTANT INFORMATION:  Please contact the [facility] directly with any questions or concerns regarding this request  Department voicemails are confidential     Send requests for admission clinical reviews, concurrent reviews, approvals, and administrative denials due to lack of clinical to fax [phone]

## 2021-07-21 LAB
ANION GAP SERPL CALCULATED.3IONS-SCNC: 7 MMOL/L (ref 4–13)
BUN SERPL-MCNC: 33 MG/DL (ref 5–25)
CALCIUM SERPL-MCNC: 8.8 MG/DL (ref 8.3–10.1)
CHLORIDE SERPL-SCNC: 95 MMOL/L (ref 100–108)
CO2 SERPL-SCNC: 29 MMOL/L (ref 21–32)
CREAT SERPL-MCNC: 1.28 MG/DL (ref 0.6–1.3)
ERYTHROCYTE [DISTWIDTH] IN BLOOD BY AUTOMATED COUNT: 11.8 % (ref 11.6–15.1)
GFR SERPL CREATININE-BSD FRML MDRD: 63 ML/MIN/1.73SQ M
GLUCOSE SERPL-MCNC: 168 MG/DL (ref 65–140)
GLUCOSE SERPL-MCNC: 228 MG/DL (ref 65–140)
GLUCOSE SERPL-MCNC: 267 MG/DL (ref 65–140)
GLUCOSE SERPL-MCNC: 277 MG/DL (ref 65–140)
GLUCOSE SERPL-MCNC: 290 MG/DL (ref 65–140)
HCT VFR BLD AUTO: 41.4 % (ref 36.5–49.3)
HGB BLD-MCNC: 14.1 G/DL (ref 12–17)
MCH RBC QN AUTO: 32.6 PG (ref 26.8–34.3)
MCHC RBC AUTO-ENTMCNC: 34.1 G/DL (ref 31.4–37.4)
MCV RBC AUTO: 96 FL (ref 82–98)
PLATELET # BLD AUTO: 204 THOUSANDS/UL (ref 149–390)
PMV BLD AUTO: 10.8 FL (ref 8.9–12.7)
POTASSIUM SERPL-SCNC: 4.1 MMOL/L (ref 3.5–5.3)
RBC # BLD AUTO: 4.32 MILLION/UL (ref 3.88–5.62)
SODIUM SERPL-SCNC: 131 MMOL/L (ref 136–145)
WBC # BLD AUTO: 9.71 THOUSAND/UL (ref 4.31–10.16)

## 2021-07-21 PROCEDURE — 99232 SBSQ HOSP IP/OBS MODERATE 35: CPT | Performed by: INTERNAL MEDICINE

## 2021-07-21 PROCEDURE — 94640 AIRWAY INHALATION TREATMENT: CPT

## 2021-07-21 PROCEDURE — 80048 BASIC METABOLIC PNL TOTAL CA: CPT | Performed by: NURSE PRACTITIONER

## 2021-07-21 PROCEDURE — 94660 CPAP INITIATION&MGMT: CPT

## 2021-07-21 PROCEDURE — 82948 REAGENT STRIP/BLOOD GLUCOSE: CPT

## 2021-07-21 PROCEDURE — 85027 COMPLETE CBC AUTOMATED: CPT | Performed by: NURSE PRACTITIONER

## 2021-07-21 PROCEDURE — 94760 N-INVAS EAR/PLS OXIMETRY 1: CPT

## 2021-07-21 RX ORDER — INSULIN GLARGINE 100 [IU]/ML
18 INJECTION, SOLUTION SUBCUTANEOUS
Status: DISCONTINUED | OUTPATIENT
Start: 2021-07-21 | End: 2021-07-22 | Stop reason: HOSPADM

## 2021-07-21 RX ORDER — PREDNISONE 20 MG/1
40 TABLET ORAL DAILY
Status: DISCONTINUED | OUTPATIENT
Start: 2021-07-22 | End: 2021-07-22 | Stop reason: HOSPADM

## 2021-07-21 RX ADMIN — B-COMPLEX W/ C & FOLIC ACID TAB 1 TABLET: TAB at 08:27

## 2021-07-21 RX ADMIN — INSULIN LISPRO 6 UNITS: 100 INJECTION, SOLUTION INTRAVENOUS; SUBCUTANEOUS at 06:34

## 2021-07-21 RX ADMIN — GUAIFENESIN 600 MG: 600 TABLET, EXTENDED RELEASE ORAL at 17:07

## 2021-07-21 RX ADMIN — BUDESONIDE 0.5 MG: 0.5 INHALANT ORAL at 06:59

## 2021-07-21 RX ADMIN — INSULIN LISPRO 1 UNITS: 100 INJECTION, SOLUTION INTRAVENOUS; SUBCUTANEOUS at 21:04

## 2021-07-21 RX ADMIN — INSULIN LISPRO 4 UNITS: 100 INJECTION, SOLUTION INTRAVENOUS; SUBCUTANEOUS at 11:35

## 2021-07-21 RX ADMIN — ISODIUM CHLORIDE 3 ML: 0.03 SOLUTION RESPIRATORY (INHALATION) at 13:11

## 2021-07-21 RX ADMIN — BUDESONIDE 0.5 MG: 0.5 INHALANT ORAL at 19:07

## 2021-07-21 RX ADMIN — ISODIUM CHLORIDE 3 ML: 0.03 SOLUTION RESPIRATORY (INHALATION) at 06:59

## 2021-07-21 RX ADMIN — FUROSEMIDE 40 MG: 40 TABLET ORAL at 08:30

## 2021-07-21 RX ADMIN — ATORVASTATIN CALCIUM 10 MG: 20 TABLET, FILM COATED ORAL at 17:07

## 2021-07-21 RX ADMIN — ISODIUM CHLORIDE 3 ML: 0.03 SOLUTION RESPIRATORY (INHALATION) at 19:07

## 2021-07-21 RX ADMIN — INSULIN GLARGINE 18 UNITS: 100 INJECTION, SOLUTION SUBCUTANEOUS at 21:04

## 2021-07-21 RX ADMIN — INSULIN LISPRO 4 UNITS: 100 INJECTION, SOLUTION INTRAVENOUS; SUBCUTANEOUS at 08:22

## 2021-07-21 RX ADMIN — INSULIN LISPRO 6 UNITS: 100 INJECTION, SOLUTION INTRAVENOUS; SUBCUTANEOUS at 17:04

## 2021-07-21 RX ADMIN — INSULIN LISPRO 4 UNITS: 100 INJECTION, SOLUTION INTRAVENOUS; SUBCUTANEOUS at 17:09

## 2021-07-21 RX ADMIN — LEVALBUTEROL HYDROCHLORIDE 1.25 MG: 1.25 SOLUTION, CONCENTRATE RESPIRATORY (INHALATION) at 06:59

## 2021-07-21 RX ADMIN — METHYLPREDNISOLONE SODIUM SUCCINATE 40 MG: 40 INJECTION, POWDER, FOR SOLUTION INTRAMUSCULAR; INTRAVENOUS at 08:24

## 2021-07-21 RX ADMIN — MONTELUKAST SODIUM 10 MG: 10 TABLET, FILM COATED ORAL at 21:04

## 2021-07-21 RX ADMIN — LEVALBUTEROL HYDROCHLORIDE 1.25 MG: 1.25 SOLUTION, CONCENTRATE RESPIRATORY (INHALATION) at 13:11

## 2021-07-21 RX ADMIN — LEVALBUTEROL HYDROCHLORIDE 1.25 MG: 1.25 SOLUTION, CONCENTRATE RESPIRATORY (INHALATION) at 19:07

## 2021-07-21 RX ADMIN — ENOXAPARIN SODIUM 40 MG: 40 INJECTION SUBCUTANEOUS at 08:28

## 2021-07-21 RX ADMIN — LISINOPRIL 30 MG: 20 TABLET ORAL at 08:27

## 2021-07-21 RX ADMIN — GUAIFENESIN 600 MG: 600 TABLET, EXTENDED RELEASE ORAL at 08:27

## 2021-07-21 NOTE — PROGRESS NOTES
Progress Note - Pulmonary   Hayder Weiss 49 y o  male MRN: [id number]  Unit/Bed#: [facility] 401-01 Encounter: [id number]      Assessment & Plan:  1  Lily Platt in acute exacerbation              History of childhood asthma and psoriasis, allergy shots in the past              Peak flow 7/19/21 was 270  Transition to oral prednisone taper              Current regimen:   § Albuterol 2 5 mg neb q4hr PRN  § Pulmicort 0 5 mg neb q12hr     § Xopenex 1 25 mg neb q8hr    § Montelukast 10 mg po daily                      Outpatient pulmonary follow up for PFTs and consider biologics      2  Decreased left ventricular systolic function              Patient given one-time dose 40 mg IV Lasix on 7/19/21              Echo 3/2020 showed EF 40-45%              Echo 7/20/21 showed EF 50% with evidence of cardiomyopathy              Home dose Lasix increased to 40 mg po daily                Outpatient cardiology follow up and stress test in September     3  Obstructive sleep apnea              Compliant with CPAP                4  Hypertension              Continue Lisinopril 30 mg po and Lasix 40 mg po     5  Hyperlipidemia              Continue Lipitor 10 mg      6  Type II diabetes              Managed by primary team   Follow up with PCP    Subjective: Today, Mr Meggan Turk reports that he is noticing subtle improvements in his breathing  He wants to stay for one more day because the breathing treatments are helping him  He denies chest pain, abdominal pain, sore throat, or congestion  He continues to have a chronic dry cough  He notes the swelling in LEs is improving and he is having adequate urine output  Review of Systems   Constitutional: Negative for chills and fever  HENT: Negative for ear pain and sore throat  Eyes: Negative for pain and visual disturbance  Respiratory: Negative for shortness of breath  Cardiovascular: Negative for chest pain and palpitations     Gastrointestinal: Negative for abdominal pain and vomiting  Genitourinary: Negative for dysuria and hematuria  Musculoskeletal: Positive for back pain  Negative for arthralgias  Skin: Negative for color change and rash  Neurological: Negative for seizures and syncope  All other systems reviewed and are negative  Objective:     Vitals:    07/20/21 2232 07/21/21 0038 07/21/21 0059 07/21/21 0701   BP: 159/88   158/89   BP Location:    Left arm   Pulse: 73   83   Resp: 18   20   Temp: 98 4 °F (36 9 °C)   97 6 °F (36 4 °C)   TempSrc:    Oral   SpO2: 93% 94% 95% 99%   Weight:       Height:               Intake/Output Summary (Last 24 hours) at 7/21/2021 0823  Last data filed at 7/21/2021 0701  Gross per 24 hour   Intake 778 ml   Output 900 ml   Net -122 ml         Physical Exam  Vitals and nursing note reviewed  Constitutional:       Appearance: He is well-developed  HENT:      Head: Normocephalic and atraumatic  Eyes:      Conjunctiva/sclera: Conjunctivae normal    Cardiovascular:      Rate and Rhythm: Normal rate and regular rhythm  Heart sounds: No murmur heard  Pulmonary:      Effort: Pulmonary effort is normal  No respiratory distress  Breath sounds: Wheezing present  Comments: Diffuse wheezing in all lung fields   Abdominal:      Palpations: Abdomen is soft  Tenderness: There is no abdominal tenderness  Musculoskeletal:      Cervical back: Neck supple  Comments: Trace bilateral edema   Skin:     General: Skin is warm and dry  Neurological:      Mental Status: He is alert  Labs: I have personally reviewed pertinent lab results    Results from last 7 days   Lab Units 07/19/21  0415 07/18/21  1815 07/15/21  1515   WBC Thousand/uL 5 96 9 40 7 19   HEMOGLOBIN g/dL 13 3 13 5 14 7   HEMATOCRIT % 38 7 39 0 44 0   PLATELETS Thousands/uL 211 252 273   NEUTROS PCT % 93* 88* 63   MONOS PCT % 1* 4 9      Results from last 7 days   Lab Units 07/21/21  0505 07/20/21  0521 07/19/21  0415 07/18/21 1815 07/15/21  1515   POTASSIUM mmol/L 4 1 3 8 4 4 3 9 3 5   CHLORIDE mmol/L 95* 95* 99* 101 102   CO2 mmol/L 29 29 26 23 27   BUN mg/dL 33* 29* 20 19 16   CREATININE mg/dL 1 28 1 13 1 14 1 02 1 25   CALCIUM mg/dL 8 8 8 8 8 6 8 5 8 8   ALK PHOS U/L  --   --   --  89 106   ALT U/L  --   --   --  20 20   AST U/L  --   --   --  16 19     Results from last 7 days   Lab Units 07/20/21  0521 07/19/21  0415   MAGNESIUM mg/dL 2 1 1 7     Results from last 7 days   Lab Units 07/19/21  0415   PHOSPHORUS mg/dL 3 6              0   Lab Value Date/Time    TROPONINI <0 02 07/19/2021 0415    TROPONINI <0 02 07/19/2021 0059    TROPONINI <0 02 07/18/2021 2312    TROPONINI <0 02 07/18/2021 1815    TROPONINI <0 02 07/15/2021 1515           Imaging and other studies: I have personally reviewed pertinent reports

## 2021-07-21 NOTE — CASE MANAGEMENT
50yo male admitted with asthma exacerbation and CHF  He is alert and oriented, independent ADLs, not employed, drives  He resides in [place] with his wife and children  Wife is Heath Fleming at [phone]  He uses a CPAP hs and has a cane to use as needed  He lives in a 3 story home with 6 MADYSON  No hx hhc or rehab, no mental  Illness or D&A  His PCP is Dr Alisia Moe and he uses [facility] on [address]  Wife will transport home at discharge  Anticipate home tomorrow  Pt  Does not have a nebulizer and will need to order if prescribed  CM reviewed d/c planning process including the following: identifying help at home, patient preference for d/c planning needs, Discharge Lounge, [facility] Meds to Bed program, availability of treatment team to discuss questions or concerns patient and/or family may have regarding understanding medications and recognizing signs and symptoms once discharged  CM also encouraged patient to follow up with all recommended appointments after discharge  Patient advised of importance for patient and family to participate in managing patients medical well being  Patient/caregiver received discharge checklist  Content reviewed  Patient/caregiver encouraged to participate in discharge plan of care prior to discharge home

## 2021-07-21 NOTE — PROGRESS NOTES
[address] [facility]  Progress Note - Melvina Bence 1965, 49 y o  male MRN: [id number]  Unit/Bed#: [facility] 401-01 Encounter: [id number]  Primary Care Provider: Kaushal Perdomo MD   Date and time admitted to hospital: 7/18/2021  5:39 PM    * Moderate persistent asthma with acute exacerbation  Assessment & Plan  Presented to ED on 7/15 for SOB  Was discharged on Proventil and Prednisone  Presents with worsening SOB despite outpatient treatment   D dimer unremarkable   Pulmonary following  Transitioned to po steroids  Cont pulmon toilet, pulm inhalers  Cont peak flow monitoring  Will need outpatient pulmonary follow-up for PFTs and consideration for biologics   Check ambulatory pulse ox prior to discharge    Cardiomyopathy [facility])  Assessment & Plan  ECHO: LVEF 40-45% in March 2020  Never seen by cardiology  On Lasix 20 mg daily by PCP  · Cardiology following  · Troponin negative   · Updated ECHO shows: LVEF 50%, akinesis of basal inferior and basal-mid inferolateral walls, G1DD, IVC was mildly dilated   · Given Lasix to 40 mg IV x1   · Cardiology up titrated home Lasix to 40 mg daily   · Outpatient Cardiology follow-up planned with possible stress testing at that point  · Monitor I+O, daily weight    VICKY (obstructive sleep apnea)  Assessment & Plan  Continue CPAP at bedtime  Mixed hyperlipidemia  Assessment & Plan  Continue Lipitor 10 mg daily      Type 2 diabetes mellitus with hyperglycemia [facility])  Assessment & Plan  Lab Results   Component Value Date    HGBA1C 7 1 (H) 07/18/2021     Recent Labs     07/19/21  1632 07/19/21  2030 07/20/21  0549 07/20/21  1047   POCGLU 358* 312* 336* 430*       Blood Sugar Average: Last 72 hrs:  (P) 345 [id number]   · Holding metformin and Januvia  · Now with steroid induced hyperglycemia   · Titrate up Lantus and Lispro   · Anticipate improvement with decreased Solumedrol dose   · Monitor ACCU checks AC + HS  · Diabetic diet     Essential hypertension  Assessment & Plan  Stable,  Cont to monitor     Irritable bowel syndrome with diarrhea  Assessment & Plan  Currently without much complaint  VTE Pharmacologic Prophylaxis:   Pharmacologic: Enoxaparin (Lovenox)  Mechanical VTE Prophylaxis in Place: No    Patient Centered Rounds: I have performed bedside rounds with nursing staff today  Discussions with Specialists or Other Care Team Provider:     Education and Discussions with Family / Patient: Patient    Time Spent for Care: 30 minutes  More than 50% of total time spent on counseling and coordination of care as described above  Current Length of Stay: 3 day(s)    Current Patient Status: Inpatient   Certification Statement: The patient will continue to require additional inpatient hospital stay due to Asthma exacerbation    Discharge Plan: D/c planning tomorrow    Code Status: Level 1 - Full Code      Subjective: Intermittent wheezing though noting improved dyspnea  No acute events overnight    Objective:     Vitals:   Temp (24hrs), Av 1 °F (36 7 °C), Min:97 6 °F (36 4 °C), Max:98 4 °F (36 9 °C)    Temp:  [97 6 °F (36 4 °C)-98 4 °F (36 9 °C)] 98 2 °F (36 8 °C)  HR:  [73-99] 99  Resp:  [12-20] 12  BP: (151-159)/(76-89) 151/76  SpO2:  [92 %-99 %] 93 %  Body mass index is 47 5 kg/m²  Input and Output Summary (last 24 hours): Intake/Output Summary (Last 24 hours) at 2021 1701  Last data filed at 2021 1435  Gross per 24 hour   Intake 1060 ml   Output 800 ml   Net 260 ml       Physical Exam:     Physical Exam  Constitutional:       Appearance: He is obese  Cardiovascular:      Rate and Rhythm: Normal rate and regular rhythm  Pulses: Normal pulses  Heart sounds: Normal heart sounds  Pulmonary:      Effort: Pulmonary effort is normal  No respiratory distress  Breath sounds: No rales  Abdominal:      General: Abdomen is flat  Bowel sounds are normal  There is no distension        Palpations: Abdomen is soft       Tenderness: There is no abdominal tenderness  There is no guarding  Musculoskeletal:         General: Normal range of motion  Cervical back: Normal range of motion and neck supple  Comments: Trace edema   Skin:     General: Skin is warm and dry  Neurological:      General: No focal deficit present  Mental Status: He is alert and oriented to person, place, and time  Mental status is at baseline  Cranial Nerves: No cranial nerve deficit  Motor: No weakness  Additional Data:     Labs:    Results from last 7 days   Lab Units 07/21/21  0505 07/19/21  0415   WBC Thousand/uL 9 71 5 96   HEMOGLOBIN g/dL 14 1 13 3   HEMATOCRIT % 41 4 38 7   PLATELETS Thousands/uL 204 211   NEUTROS PCT %  --  93*   LYMPHS PCT %  --  5*   MONOS PCT %  --  1*   EOS PCT %  --  0     Results from last 7 days   Lab Units 07/21/21  0505 07/18/21  1815   SODIUM mmol/L 131* 134*   POTASSIUM mmol/L 4 1 3 9   CHLORIDE mmol/L 95* 101   CO2 mmol/L 29 23   BUN mg/dL 33* 19   CREATININE mg/dL 1 28 1 02   ANION GAP mmol/L 7 10   CALCIUM mg/dL 8 8 8 5   ALBUMIN g/dL  --  2 9*   TOTAL BILIRUBIN mg/dL  --  0 41   ALK PHOS U/L  --  89   ALT U/L  --  20   AST U/L  --  16   GLUCOSE RANDOM mg/dL 290* 381*         Results from last 7 days   Lab Units 07/21/21  1616 07/21/21  1053 07/21/21  0621 07/20/21  2040 07/20/21  1556 07/20/21  1047 07/20/21  0549 07/19/21  2030 07/19/21  1632 07/19/21  1056 07/19/21  0613 07/19/21  0032   POC GLUCOSE mg/dl 277* 228* 267* 192* 198* 430* 336* 312* 358* 325* 347* 312*     Results from last 7 days   Lab Units 07/18/21  2312   HEMOGLOBIN A1C % 7 1*               * I Have Reviewed All Lab Data Listed Above  * Additional Pertinent Lab Tests Reviewed:  All Labs Within Last 24 Hours Reviewed    Imaging:    Imaging Reports Reviewed Today Include:   Imaging Personally Reviewed by Myself Includes:      Recent Cultures (last 7 days):           Last 24 Hours Medication List:   Current Facility-Administered Medications   Medication Dose Route Frequency Provider Last Rate    acetaminophen  650 mg Oral Q6H PRN Steve June MD      albuterol  2 5 mg Nebulization Q4H PRN Steve June MD      aluminum-magnesium hydroxide-simethicone  30 mL Oral Q6H PRN Steve June MD      atorvastatin  10 mg Oral After Baron Nilo MD      budesonide  0 5 mg Nebulization Q12H Andreia Mcintosh MD      docusate sodium  100 mg Oral BID PRN Steve June MD      enoxaparin  40 mg Subcutaneous Daily Steve June MD      furosemide  40 mg Oral Daily RODRIGUE Matias      guaiFENesin  600 mg Oral BID Steve June MD      insulin glargine  15 Units Subcutaneous HS RODRIGUE Haney      insulin lispro  1-5 Units Subcutaneous HS Steve June MD      insulin lispro  2-12 Units Subcutaneous TID [facility] Steve June MD      insulin lispro  4 Units Subcutaneous TID With Meals RODRIGUE Haney      levalbuterol  1 25 mg Nebulization TID Steve June MD      And    sodium chloride  3 mL Nebulization TID Steve June MD      lisinopril  30 mg Oral Daily Steve June MD      montelukast  10 mg Oral HS Steve June MD      multivitamin stress formula  1 tablet Oral Daily Steve June MD      ondansetron  4 mg Intravenous Q6H PRN Steve June MD     [facility] ON 7/22/2021] predniSONE  40 mg Oral Daily Zahra Dallas DO          Today, Patient Was Seen By: Zahra Dallas DO    ** Please Note: Dictation voice to text software may have been used in the creation of this document   **

## 2021-07-21 NOTE — UTILIZATION REVIEW
Continued Stay Review    Date: 7/21/21                         Current Patient Class: inpatient  Current Level of Care: med surg     HPI:56 y o  male initially admitted on 7/19/21 due to moderate persistent asthma with acute exacerbation    Assessment/Plan: Patient seen and examined  No significant events overnight  pertinent negatives - chest pain, chest pressure/discomfort, irregular heart beat and palpitations  Continues with stable dyspnea  1  Dyspnea likely due to acute asthma exacerbation  Continue management per Pulmonary recommendations  2  Cardiomyopathy:  Ejection fraction most recently noted to be 50% with akinesis of the basal inferior and basal to mid inferolateral walls  Volume status appears relatively euvolemic  Continue on maintenance Lasix at 40 mg p o  Daily  Does have risk factors for coronary disease, recommend outpatient stress testing to evaluate for CAD  3  Asthma:  With acute exacerbation, continued on steroids and nebulizers  4  Hypertension:  Elevated, however seems to be better controlled at home in the 068-246 systolic range  Continue to follow on current regimen  5  Hyperlipidemia:  Continue on statin       Vital Signs:   Date/Time  Temp  Pulse  Resp  BP  MAP (mmHg)  SpO2  Calculated FIO2 (%) - Nasal Cannula  Nasal Cannula O2 Flow Rate (L/min)  O2 Device  O2 Interface Device  Patient Position - Orthostatic VS   07/21/21 07:01:55  97 6 °F (36 4 °C)  83  20  158/89  112  99 %  --  --  None (Room air)  --  Sitting   07/21/21 0100  --  --  --  --  --  --  36  4 L/min  Nasal cannula  --  --   07/21/21 0059  --  --  --  --  --  95 %  36  4 L/min  --  --  --   07/21/21 0038  --  --  --  --  --  94 %  --  --  --  Face mask  --   07/20/21 22:32:20  98 4 °F (36 9 °C)  73  18  159/88  112  93 %  --  --  --  --  --   07/20/21 1900  --  --  --  --  --  96 %  --  --  --  --  --   07/20/21 14:55:01  97 8 °F (36 6 °C)  89  13  163/82  109  90 %  --  --  --  --  --   07/20/21 1329  --  -- --  --  --  97 %  --  --  None (Room air)  --  --   07/20/21 0801  --  --  --  --  --  94 %  --  --  Nasal cannula  --  --   07/20/21 07:05:44  97 8 °F (36 6 °C)  80  14  161/98  119  95 %  --  --  --  --  --   07/20/21 02:55:23  97 5 °F (36 4 °C)  71  18  154/87  109  97 %  --  --  --  --  --   07/19/21 22:48:39  97 4 °F (36 3 °C)Abnormal   88  18  158/98  118  95 %  --  --  --  --  --   07/19/21 2000  --  --  --  --  --  94 %  --  --  None (Room air)  --  --   07/19/21 19:35:51  98 5 °F (36 9 °C)  69  20  175/88Abnormal   117  91 %  --  --  --  --  --   07/19/21 14:28:16  97 8 °F (36 6 °C)  83  20  172/93Abnormal   119  93 %  --  --  --  --  --   07/19/21 1356  --  --  --  --  --  95 %  --  --  --  --  --   07/19/21 10:49:05  97 6 °F (36 4 °C)  70  --  177/91Abnormal   120  93 %  --  --  --  --  --   07/19/21 09:22:48  --  79  --  177/88Abnormal   118  92 %  --  --  --  --  --   07/19/21 08:01:16  --  77  --  180/89Abnormal   119  94 %  --  --  --  --  --   07/19/21 0800  --  --  --  --  --  93 %  --  --  None (Room air)  --  --   07/19/21 0748  --  --  --  --  --  94 %  --  --  None (Room air)  --  --   07/19/21 0558  99 1 °F (37 3 °C)  67  20  182/102Abnormal   129  94 %  --  --  None (Room air)  --  Sitting     Pertinent Labs/Diagnostic Results:       Results from last 7 days   Lab Units 07/21/21  0505 07/19/21  0415 07/18/21  1815 07/15/21  1515   WBC Thousand/uL 9 71 5 96 9 40 7 19   HEMOGLOBIN g/dL 14 1 13 3 13 5 14 7   HEMATOCRIT % 41 4 38 7 39 0 44 0   PLATELETS Thousands/uL 204 211 252 273   NEUTROS ABS Thousands/µL  --  5 47 8 23* 4 56     Results from last 7 days   Lab Units 07/21/21  0505 07/20/21  0521 07/19/21 0415 07/18/21  1815 07/15/21  1515   SODIUM mmol/L 131* 132* 133* 134* 137   POTASSIUM mmol/L 4 1 3 8 4 4 3 9 3 5   CHLORIDE mmol/L 95* 95* 99* 101 102   CO2 mmol/L 29 29 26 23 27   ANION GAP mmol/L 7 8 8 10 8   BUN mg/dL 33* 29* 20 19 16   CREATININE mg/dL 1 28 1 13 1 14 1 02 1 25   EGFR ml/min/1 73sq m 63 73 73 83 65   CALCIUM mg/dL 8 8 8 8 8 6 8 5 8 8   MAGNESIUM mg/dL  --  2 1 1 7  --   --    PHOSPHORUS mg/dL  --   --  3 6  --   --      Results from last 7 days   Lab Units 07/18/21  1815 07/15/21  1515   AST U/L 16 19   ALT U/L 20 20   ALK PHOS U/L 89 106   TOTAL PROTEIN g/dL 6 7 7 6   ALBUMIN g/dL 2 9* 3 4*   TOTAL BILIRUBIN mg/dL 0 41 0 63     Results from last 7 days   Lab Units 07/21/21  1053 07/21/21  0621 07/20/21  2040 07/20/21  1556 07/20/21  1047 07/20/21  0549 07/19/21  2030 07/19/21  1632 07/19/21  1056 07/19/21  0613 07/19/21  0032   POC GLUCOSE mg/dl 228* 267* 192* 198* 430* 336* 312* 358* 325* 347* 312*     Results from last 7 days   Lab Units 07/21/21  0505 07/20/21  0521 07/19/21  0415 07/18/21  1815 07/15/21  1515   GLUCOSE RANDOM mg/dL 290* 346* 361* 381* 169*     Results from last 7 days   Lab Units 07/18/21  2312   HEMOGLOBIN A1C % 7 1*   EAG mg/dl 157     Results from last 7 days   Lab Units 07/18/21  2312   PH OMERO  7 481*   PCO2 OMERO mm Hg 32 5*   PO2 OMERO mm Hg 66 5*   HCO3 OMERO mmol/L 23 7*   BASE EXC OMERO mmol/L 0 9   O2 CONTENT OMERO ml/dL 18 3   O2 HGB, VENOUS % 92 9*     Results from last 7 days   Lab Units 07/19/21  0415 07/19/21  0059 07/18/21  2312 07/18/21  1815 07/15/21  1515   TROPONIN I ng/mL <0 02 <0 02 <0 02 <0 02 <0 02     Results from last 7 days   Lab Units 07/19/21  0059   D-DIMER QUANTITATIVE ug/ml FEU 0 32         Results from last 7 days   Lab Units 07/19/21  0415   TSH 3RD GENERATON uIU/mL 1 100     Results from last 7 days   Lab Units 07/18/21  1815   NT-PRO BNP pg/mL 605*     Results from last 7 days   Lab Units 07/18/21  2318   CLARITY UA  Clear   COLOR UA  Yellow   SPEC GRAV UA  1 026   PH UA  5 5   GLUCOSE UA mg/dl >=1000 (1%)*   KETONES UA mg/dl Trace*   BLOOD UA  Negative   PROTEIN UA mg/dl Negative   NITRITE UA  Negative   BILIRUBIN UA  Negative   UROBILINOGEN UA E U /dl 0 2   LEUKOCYTES UA  Elevated glucose may cause decreased leukocyte values   See urine microscopic for UWBC result/*   WBC UA /hpf None Seen   RBC UA /hpf None Seen   BACTERIA UA /hpf Occasional   EPITHELIAL CELLS WET PREP /hpf None Seen   MUCUS THREADS  None Seen       Medications:   Scheduled Medications:  atorvastatin, 10 mg, Oral, After Dinner  budesonide, 0 5 mg, Nebulization, Q12H  enoxaparin, 40 mg, Subcutaneous, Daily  furosemide, 40 mg, Oral, Daily  guaiFENesin, 600 mg, Oral, BID  insulin glargine, 15 Units, Subcutaneous, HS  insulin lispro, 1-5 Units, Subcutaneous, HS  insulin lispro, 2-12 Units, Subcutaneous, TID AC  insulin lispro, 4 Units, Subcutaneous, TID With Meals  levalbuterol, 1 25 mg, Nebulization, TID   And  sodium chloride, 3 mL, Nebulization, TID  lisinopril, 30 mg, Oral, Daily  montelukast, 10 mg, Oral, HS  multivitamin stress formula, 1 tablet, Oral, Daily  [START ON 7/22/2021] predniSONE, 40 mg, Oral, Daily      Continuous IV Infusions: none     PRN Meds:  acetaminophen, 650 mg, Oral, Q6H PRN  albuterol, 2 5 mg, Nebulization, Q4H PRN  aluminum-magnesium hydroxide-simethicone, 30 mL, Oral, Q6H PRN  docusate sodium, 100 mg, Oral, BID PRN  ondansetron, 4 mg, Intravenous, Q6H PRN        Discharge Plan: [facility]    Network Utilization Review Department  ATTENTION: Please call with any questions or concerns to [phone] and carefully listen to the prompts so that you are directed to the right person  All voicemails are confidential   Yoel Moe all requests for admission clinical reviews, approved or denied determinations and any other requests to dedicated fax number below belonging to the campus where the patient is receiving treatment   List of dedicated fax numbers for the Facilities:  [address] DENIALS (Administrative/Medical Necessity) [phone]   [address] (Maternity/NICU/Pediatrics) 261 [facility],[address] [address] [place] [id number] [place] [address] Berkley [address] [place] [address] [phone]

## 2021-07-22 ENCOUNTER — TRANSITIONAL CARE MANAGEMENT (OUTPATIENT)
Dept: FAMILY MEDICINE CLINIC | Facility: CLINIC | Age: 56
End: 2021-07-22

## 2021-07-22 VITALS
DIASTOLIC BLOOD PRESSURE: 83 MMHG | OXYGEN SATURATION: 96 % | HEIGHT: 73 IN | HEART RATE: 94 BPM | RESPIRATION RATE: 16 BRPM | TEMPERATURE: 97.5 F | SYSTOLIC BLOOD PRESSURE: 151 MMHG | BODY MASS INDEX: 41.75 KG/M2 | WEIGHT: 315 LBS

## 2021-07-22 LAB
ANION GAP SERPL CALCULATED.3IONS-SCNC: 7 MMOL/L (ref 4–13)
BUN SERPL-MCNC: 32 MG/DL (ref 5–25)
CALCIUM SERPL-MCNC: 8.5 MG/DL (ref 8.3–10.1)
CHLORIDE SERPL-SCNC: 99 MMOL/L (ref 100–108)
CO2 SERPL-SCNC: 31 MMOL/L (ref 21–32)
CREAT SERPL-MCNC: 1.13 MG/DL (ref 0.6–1.3)
GFR SERPL CREATININE-BSD FRML MDRD: 73 ML/MIN/1.73SQ M
GLUCOSE SERPL-MCNC: 121 MG/DL (ref 65–140)
GLUCOSE SERPL-MCNC: 143 MG/DL (ref 65–140)
GLUCOSE SERPL-MCNC: 158 MG/DL (ref 65–140)
GLUCOSE SERPL-MCNC: 311 MG/DL (ref 65–140)
POTASSIUM SERPL-SCNC: 3.6 MMOL/L (ref 3.5–5.3)
SODIUM SERPL-SCNC: 137 MMOL/L (ref 136–145)

## 2021-07-22 PROCEDURE — 94760 N-INVAS EAR/PLS OXIMETRY 1: CPT

## 2021-07-22 PROCEDURE — 99232 SBSQ HOSP IP/OBS MODERATE 35: CPT | Performed by: INTERNAL MEDICINE

## 2021-07-22 PROCEDURE — 80048 BASIC METABOLIC PNL TOTAL CA: CPT | Performed by: INTERNAL MEDICINE

## 2021-07-22 PROCEDURE — 94150 VITAL CAPACITY TEST: CPT

## 2021-07-22 PROCEDURE — 82948 REAGENT STRIP/BLOOD GLUCOSE: CPT

## 2021-07-22 PROCEDURE — 94640 AIRWAY INHALATION TREATMENT: CPT

## 2021-07-22 RX ORDER — FUROSEMIDE 40 MG/1
40 TABLET ORAL DAILY
Qty: 30 TABLET | Refills: 1 | Status: SHIPPED | OUTPATIENT
Start: 2021-07-23 | End: 2021-09-29 | Stop reason: SDUPTHER

## 2021-07-22 RX ORDER — PREDNISONE 10 MG/1
TABLET ORAL
Qty: 26 TABLET | Refills: 0 | Status: SHIPPED | OUTPATIENT
Start: 2021-07-22 | End: 2021-08-02 | Stop reason: ALTCHOICE

## 2021-07-22 RX ORDER — GUAIFENESIN 600 MG
600 TABLET, EXTENDED RELEASE 12 HR ORAL 2 TIMES DAILY
Qty: 14 TABLET | Refills: 0 | Status: SHIPPED | OUTPATIENT
Start: 2021-07-22 | End: 2021-07-29

## 2021-07-22 RX ORDER — LEVALBUTEROL 1.25 MG/.5ML
1.25 SOLUTION, CONCENTRATE RESPIRATORY (INHALATION)
Qty: 45 ML | Refills: 0 | Status: SHIPPED | OUTPATIENT
Start: 2021-07-22 | End: 2021-08-21

## 2021-07-22 RX ORDER — SODIUM CHLORIDE FOR INHALATION 0.9 %
3 VIAL, NEBULIZER (ML) INHALATION
Qty: 270 ML | Refills: 0 | Status: SHIPPED | OUTPATIENT
Start: 2021-07-22 | End: 2021-11-06 | Stop reason: SDUPTHER

## 2021-07-22 RX ORDER — BUDESONIDE AND FORMOTEROL FUMARATE DIHYDRATE 160; 4.5 UG/1; UG/1
2 AEROSOL RESPIRATORY (INHALATION) 2 TIMES DAILY
Qty: 10.2 G | Refills: 0 | Status: SHIPPED | OUTPATIENT
Start: 2021-07-22 | End: 2022-02-03 | Stop reason: SDUPTHER

## 2021-07-22 RX ORDER — MONTELUKAST SODIUM 10 MG/1
10 TABLET ORAL
Qty: 30 TABLET | Refills: 1 | Status: SHIPPED | OUTPATIENT
Start: 2021-07-22 | End: 2022-02-03 | Stop reason: SDUPTHER

## 2021-07-22 RX ADMIN — B-COMPLEX W/ C & FOLIC ACID TAB 1 TABLET: TAB at 08:36

## 2021-07-22 RX ADMIN — LEVALBUTEROL HYDROCHLORIDE 1.25 MG: 1.25 SOLUTION, CONCENTRATE RESPIRATORY (INHALATION) at 06:48

## 2021-07-22 RX ADMIN — LISINOPRIL 30 MG: 20 TABLET ORAL at 08:36

## 2021-07-22 RX ADMIN — INSULIN LISPRO 8 UNITS: 100 INJECTION, SOLUTION INTRAVENOUS; SUBCUTANEOUS at 16:24

## 2021-07-22 RX ADMIN — GUAIFENESIN 600 MG: 600 TABLET, EXTENDED RELEASE ORAL at 08:36

## 2021-07-22 RX ADMIN — FUROSEMIDE 40 MG: 40 TABLET ORAL at 08:37

## 2021-07-22 RX ADMIN — PREDNISONE 40 MG: 20 TABLET ORAL at 08:37

## 2021-07-22 RX ADMIN — ISODIUM CHLORIDE 3 ML: 0.03 SOLUTION RESPIRATORY (INHALATION) at 06:48

## 2021-07-22 RX ADMIN — LEVALBUTEROL HYDROCHLORIDE 1.25 MG: 1.25 SOLUTION, CONCENTRATE RESPIRATORY (INHALATION) at 13:00

## 2021-07-22 RX ADMIN — INSULIN LISPRO 2 UNITS: 100 INJECTION, SOLUTION INTRAVENOUS; SUBCUTANEOUS at 11:44

## 2021-07-22 RX ADMIN — BUDESONIDE 0.5 MG: 0.5 INHALANT ORAL at 06:48

## 2021-07-22 RX ADMIN — ISODIUM CHLORIDE 3 ML: 0.03 SOLUTION RESPIRATORY (INHALATION) at 13:00

## 2021-07-22 RX ADMIN — ENOXAPARIN SODIUM 40 MG: 40 INJECTION SUBCUTANEOUS at 08:37

## 2021-07-22 NOTE — ASSESSMENT & PLAN NOTE
Presented to ED on 7/15 for SOB  Was discharged on Proventil and Prednisone  Presents with worsening SOB despite outpatient treatment   D dimer unremarkable   Pulmonary following  Per pulm ok with discharge with following inhaler regimen: symbicort, singulair, xopenex, prn ventolin   Nebulizer machine provided  D/c on steroid taper  Will need outpatient pulmonary follow-up for PFTs and consideration for biologics   Weaned to room air, s/p ambulatory pulse ox

## 2021-07-22 NOTE — PROGRESS NOTES
Cardiology Progress Note - Jan Alvarez 49 y o  male MRN: [id number]    Unit/Bed#: [facility] 401-01 Encounter: [id number]      Assessment/Recommendations:  1  Dyspnea:  Likely due to acute asthma exacerbation  Continue management per Pulmonary recommendations  2  Cardiomyopathy:  Ejection fraction most recently noted to be 50% with akinesis of the basal inferior and basal to mid inferolateral walls  Volume status appears relatively euvolemic other than mild edema, responding to PO lasix  Continue on maintenance Lasix at 40 mg p o  Daily  Does have risk factors for coronary disease, recommend outpatient stress testing to evaluate for CAD  3  Asthma:  With acute exacerbation, continued on steroids and nebulizers  4  Hypertension:  Elevated, however seems to be better controlled at home in the 777-525 systolic range  Continue to follow on current regimen  5  Hyperlipidemia:  Continue on statin  6  Type 2 diabetes mellitus:  As per primary team   7  VICKY:  Continued compliance with CPAP recommendation  8  Obesity:  Weight loss and lifestyle modifications recommended  9  Stable from cardiac standpoint, outpatient follow up recommended  Subjective:   Patient seen and examined  No significant events overnight   ; pertinent negatives - chest pain, chest pressure/discomfort, irregular heart beat and palpitations  Improving dyspnea  Objective:     Vitals: Blood pressure 153/82, pulse 92, temperature 98 1 °F (36 7 °C), resp  rate 14, height 6' 1" (1 854 m), weight (!) 163 kg (360 lb), SpO2 96 %  , Body mass index is 47 5 kg/m² ,   Orthostatic Blood Pressures      Most Recent Value   Blood Pressure  153/82 filed at 07/22/2021 0700   Patient Position - Orthostatic VS  Sitting filed at 07/21/2021 0701            Intake/Output Summary (Last 24 hours) at 7/22/2021 0843  Last data filed at 7/22/2021 0601  Gross per 24 hour   Intake 1501 ml   Output 1600 ml   Net -99 ml         Physical Exam:    GEN: Jan Alvarez appears well, alert and oriented x 3, pleasant and cooperative   HEENT: pupils equal, round, and reactive to light; extraocular muscles intact  NECK: supple, no carotid bruits   HEART: regular rhythm, normal S1 and S2, no murmurs, clicks, gallops or rubs   LUNGS: clear to auscultation bilaterally; no wheezes, rales, or rhonchi   ABDOMEN: normal bowel sounds, soft, no tenderness, no distention  EXTREMITIES: peripheral pulses normal; no clubbing, cyanosis, + edema  NEURO: no focal findings   SKIN: normal without suspicious lesions on exposed skin    Medications:      Current Facility-Administered Medications:     acetaminophen (TYLENOL) tablet 650 mg, 650 mg, Oral, Q6H PRN, Elia Mitchell MD    albuterol inhalation solution 2 5 mg, 2 5 mg, Nebulization, Q4H PRN, Elia Mitchell MD    aluminum-magnesium hydroxide-simethicone (MYLANTA) oral suspension 30 mL, 30 mL, Oral, Q6H PRN, Elia Mitchell MD    atorvastatin (LIPITOR) tablet 10 mg, 10 mg, Oral, After Cristhian Henderson MD, 10 mg at 07/21/21 1707    budesonide (PULMICORT) inhalation solution 0 5 mg, 0 5 mg, Nebulization, Q12H, Anne Alexander MD, 0 5 mg at 07/22/21 6833    docusate sodium (COLACE) capsule 100 mg, 100 mg, Oral, BID PRN, Elia Mitchell MD    enoxaparin (LOVENOX) subcutaneous injection 40 mg, 40 mg, Subcutaneous, Daily, Elia Mitchell MD, 40 mg at 07/22/21 0837    furosemide (LASIX) tablet 40 mg, 40 mg, Oral, Daily, RODRIGUE Morse, 40 mg at 07/22/21 5620    guaiFENesin (MUCINEX) 12 hr tablet 600 mg, 600 mg, Oral, BID, Elia Mitchell MD, 600 mg at 07/22/21 0836    insulin glargine (LANTUS) subcutaneous injection 18 Units 0 18 mL, 18 Units, Subcutaneous, HS, Josefina Lo DO, 18 Units at 07/21/21 2104    insulin lispro (HumaLOG) 100 units/mL subcutaneous injection 1-5 Units, 1-5 Units, Subcutaneous, HS, Elia Mitchell MD, 1 Units at 07/21/21 4341    insulin lispro (HumaLOG) 100 units/mL subcutaneous injection 2-12 Units, 2-12 Units, Subcutaneous, TID AC, 6 Units at 07/21/21 1704 **AND** Fingerstick Glucose (POCT), , , TID AC, Nico Boggs MD    insulin lispro (HumaLOG) 100 units/mL subcutaneous injection 7 Units, 7 Units, Subcutaneous, TID With Meals, Thurl Sloop, DO, 7 Units at 07/22/21 0833    levalbuterol (XOPENEX) inhalation solution 1 25 mg, 1 25 mg, Nebulization, TID, 1 25 mg at 07/22/21 7737 **AND** sodium chloride 0 9 % inhalation solution 3 mL, 3 mL, Nebulization, TID, Nico Boggs MD, 3 mL at 07/22/21 0648    lisinopril (ZESTRIL) tablet 30 mg, 30 mg, Oral, Daily, Nico Boggs MD, 30 mg at 07/22/21 0836    montelukast (SINGULAIR) tablet 10 mg, 10 mg, Oral, HS, Nico Boggs MD, 10 mg at 07/21/21 2104    multivitamin stress formula tablet 1 tablet, 1 tablet, Oral, Daily, Nico Boggs MD, 1 tablet at 07/22/21 0836    ondansetron (ZOFRAN) injection 4 mg, 4 mg, Intravenous, Q6H PRN, Nico Boggs MD    predniSONE tablet 40 mg, 40 mg, Oral, Daily, Thurl Sloop, DO, 40 mg at 07/22/21 0837     Labs & Results:    Results from last 7 days   Lab Units 07/19/21  0415 07/19/21  0059 07/18/21  2312   TROPONIN I ng/mL <0 02 <0 02 <0 02     Results from last 7 days   Lab Units 07/21/21  0505 07/19/21  0415 07/18/21  1815   WBC Thousand/uL 9 71 5 96 9 40   HEMOGLOBIN g/dL 14 1 13 3 13 5   HEMATOCRIT % 41 4 38 7 39 0   PLATELETS Thousands/uL 204 211 252         Results from last 7 days   Lab Units 07/22/21  0500 07/21/21  0505 07/20/21  0521 07/18/21  1815 07/15/21  1515   POTASSIUM mmol/L 3 6 4 1 3 8 3 9 3 5   CHLORIDE mmol/L 99* 95* 95* 101 102   CO2 mmol/L 31 29 29 23 27   BUN mg/dL 32* 33* 29* 19 16   CREATININE mg/dL 1 13 1 28 1 13 1 02 1 25   CALCIUM mg/dL 8 5 8 8 8 8 8 5 8 8   ALK PHOS U/L  --   --   --  89 106   ALT U/L  --   --   --  20 20   AST U/L  --   --   --  16 19         Results from last 7 days   Lab Units 07/20/21  0521 07/19/21  0415   MAGNESIUM mg/dL 2 1 1 7       Echo: personally reviewed - EF 50%, AK of basal inf and basal-mid IL walls, G1DD, LAE    EKG personally reviewed by Kellie Pina MD

## 2021-07-22 NOTE — ASSESSMENT & PLAN NOTE
Lab Results   Component Value Date    HGBA1C 7 1 (H) 07/18/2021     Recent Labs     07/21/21  1616 07/21/21  2034 07/22/21  0607 07/22/21  1050   POCGLU 277* 168* 121 158*       Blood Sugar Average: Last 72 hrs:  (P) 268 6   Upon discharge resume on metformin and januvia  Had not been taking the metformin, communicated to patient that he should resume  As anticipated improved cbg with steroid taper    Pt encouraged to continue cbg monitoring

## 2021-07-22 NOTE — DISCHARGE INSTRUCTIONS
Asthma   WHAT YOU NEED TO KNOW:   What is asthma? Asthma is a lung disease that makes breathing difficult  Chronic inflammation and reactions to triggers narrow the airways in the lungs  Asthma can become life-threatening if it is not managed  What is cough-variant asthma? Cough-variant asthma is a type of asthma that causes a dry cough that keeps coming back  A dry cough may be your only symptom, or you may also have chest tightness  These symptoms may be caused by exercise or exposure to odors, allergens, or respiratory tract infections  Cough-variant asthma is treated the same way as typical asthma  What are the signs and symptoms of asthma? · Coughing    · Wheezing    · Shortness of breath    · Chest tightness    What may trigger an asthma attack? · A cold, the flu, or a sinus infection    · Exercise    · Weather changes, especially cold, dry air    · Smoking or secondhand smoke    · Fumes from chemicals, dust, air pollution, or other small particles in the air    · Pets, pollen, dust mites, or cockroaches    How is asthma diagnosed? Your healthcare provider will examine you and listen to your lungs  He or she will ask how often you have symptoms and what makes them worse  Tell him or her if you have trouble sleeping, exercising, or doing other activities because of shortness of breath  Your provider will ask about your allergies and past colds, and if anyone in your family has allergies or asthma  Tell your healthcare provider about medicines you take, including over-the-counter drugs and herbal supplements  You may need a chest x-ray to check for lung problems, or a lung function test  Lung function tests show how well you can breathe  How is asthma treated? · Medicines  may be used to decrease inflammation, open airways, and make it easier to breathe  Medicines may be inhaled, taken as a pill, or injected  Short-term medicines relieve your symptoms quickly   Long-term medicines are used to prevent future asthma attacks  Other medicines may be needed if your regular medicines are not able to prevent attacks  · Allergy testing  may find allergies that trigger an asthma attack  You may need allergy shots or medicine to control allergies that make your asthma worse  How can I manage my symptoms and prevent future attacks? · Follow your Asthma Action Plan (AAP)  This is a written plan that you and your healthcare provider create  It explains which medicine you need and when to change doses if necessary  It also explains how you can monitor symptoms and use a peak flow meter  The meter measures how well your lungs are working  · Manage other health conditions , such as allergies, acid reflux, and sleep apnea  · Identify and avoid triggers  These may include pets, dust mites, mold, and cockroaches  · Do not smoke or be around others who smoke  Nicotine and other chemicals in cigarettes and cigars can cause lung damage  Ask your healthcare provider for information if you currently smoke and need help to quit  E-cigarettes or smokeless tobacco still contain nicotine  Talk to your healthcare provider before you use these products  · Ask about the flu vaccine  The flu can make your asthma worse  You may need a yearly flu shot  Call your local emergency number (911 in the [address]) if:   · You have severe shortness of breath  · The skin around your neck and ribs pulls in with each breath  · Your peak flow numbers are in the red zone of your AAP  When should I seek immediate care? · You have shortness of breath, even after you take your short-term medicine as directed  · Your lips or nails turn blue or gray  When should I call my doctor? · You run out of medicine before your next refill is due  · Your symptoms get worse  · You need to take more medicine than usual to control your symptoms      · You have questions or concerns about your condition or care  CARE AGREEMENT:   You have the right to help plan your care  Learn about your health condition and how it may be treated  Discuss treatment options with your healthcare providers to decide what care you want to receive  You always have the right to refuse treatment  The above information is an  only  It is not intended as medical advice for individual conditions or treatments  Talk to your doctor, nurse or pharmacist before following any medical regimen to see if it is safe and effective for you  © Copyright [employer] 2021 Information is for End User's use only and may not be sold, redistributed or otherwise used for commercial purposes  All illustrations and images included in CareNotes® are the copyrighted property of [employer]  or Phyllis Rojo       Asthma   WHAT YOU NEED TO KNOW:   Asthma is a lung disease that makes breathing difficult  Chronic inflammation and reactions to triggers narrow the airways in the lungs  Asthma can become life-threatening if it is not managed  DISCHARGE INSTRUCTIONS:   Call your local emergency number (911 in the [address]) if:   · You have severe shortness of breath  · The skin around your neck and ribs pulls in with each breath  · Your peak flow numbers are in the red zone of your AAP  Seek care immediately if:   · You have shortness of breath, even after you take your short-term medicine as directed  · Your lips or nails turn blue or gray  Call your doctor or asthma specialist if:   · You run out of medicine before your next refill is due  · Your symptoms get worse  · You need to take more medicine than usual to control your symptoms  · You have questions or concerns about your condition or care  Medicines:   · Medicines  may be used to decrease inflammation, open airways, and make it easier to breathe  Medicines may be inhaled, taken as a pill, or injected  Short-term medicines relieve your symptoms quickly  Long-term medicines are used to prevent future asthma attacks  Other medicines may be needed if your regular medicines are not able to prevent attacks  You may also need medicine to help control your allergies  Ask your healthcare provider for more information about any medicine you are given and how to take it safely  · Take your medicine as directed  Contact your healthcare provider if you think your medicine is not helping or if you have side effects  Tell him or her if you are allergic to any medicine  Keep a list of the medicines, vitamins, and herbs you take  Include the amounts, and when and why you take them  Bring the list or the pill bottles to follow-up visits  Carry your medicine list with you in case of an emergency  Manage your symptoms and prevent future attacks:   · Follow your Asthma Action Plan (AAP)  This is a written plan that you and your healthcare provider create  It explains which medicine you need and when to change doses if necessary  It also explains how you can monitor symptoms and use a peak flow meter  The meter measures how well your lungs are working  · Manage other health conditions , such as allergies, acid reflux, and sleep apnea  · Identify and avoid triggers  These may include pets, dust mites, mold, and cockroaches  · Do not smoke or be around others who smoke  Nicotine and other chemicals in cigarettes and cigars can cause lung damage  Ask your healthcare provider for information if you currently smoke and need help to quit  E-cigarettes or smokeless tobacco still contain nicotine  Talk to your healthcare provider before you use these products  · Ask about the flu vaccine  The flu can make your asthma worse  You may need a yearly flu shot  Follow up with your healthcare provider as directed: You will need to return to make sure your medicine is working and your symptoms are controlled   You may be referred to an asthma or allergy specialist  Marivel Ca may be asked to keep a record of your peak flow values and bring it with you to your appointments  Write down your questions so you remember to ask them during your visits  © Copyright Contractually 2021 Information is for End User's use only and may not be sold, redistributed or otherwise used for commercial purposes  All illustrations and images included in CareNotes® are the copyrighted property of [employer]  or Phyllis Rojo   The above information is an  only  It is not intended as medical advice for individual conditions or treatments  Talk to your doctor, nurse or pharmacist before following any medical regimen to see if it is safe and effective for you  How to Use a Nebulizer   WHAT YOU NEED TO KNOW:   A nebulizer is a device that turns liquid medicine into a mist  As you breathe, the mist of medicine moves into your lungs  The medicine may be an antibiotic or other medicine for your lungs  The nebulizer is usually connected to a machine that pushes air through the nebulizer  The air helps turn the medicine into a mist  When a nebulizer is used it is called a breathing treatment or nebulizer treatment  DISCHARGE INSTRUCTIONS:   Call 911 if:   · Your breathing gets worse after a treatment  · Your chest suddenly feels tight after your treatment  Contact your healthcare provider if:   · You develop a rash or hives after a treatment  · Your hands, arms, or legs shake after the treatment  · You have a fast heartbeat and feel dizzy  · You have a fever and sore mouth or throat  · Your symptoms do not improve, even with treatment  · You have questions or concerns about your condition or care  How to use your nebulizer:   · Wash your hands with soap and water before you prepare the nebulizer for use  This may prevent germs from getting into your lungs  · Prepare the machine  Place the machine on a hard surface  Check to see if the air filter is clean   If it is dirty, rinse it using cold water and let it air dry  Plug in the machine  · Prepare the medicine  If your medicine is premixed, open it and place it in the nebulizer medicine container  If you have to mix medicines, place the correct amounts into the container using a dropper or syringe  · Add saline if needed  You may need to add saline (saltwater) to your medicine container  Buy sterile normal saline at a pharmacy  Do not use homemade saline solution in a nebulizer  · Connect the container  Connect the medicine container to the machine using the tubing  Connect the mask or mouthpiece to the top of the container  · Place the mouthpiece between your teeth  Close your lips around it  You may instead, place the mask on your face  · Turn on the machine  Keep the medicine container in an upright position  Breathe in and out slowly and deeply through your mouth until the mist is gone  The treatment is over when all the medicine is gone or there is no more mist coming out  The whole treatment may take up to 20 minutes  The machine may also make a sputtering noise when the treatment is done  Clean your nebulizer:   · Clean after each use  Wash the container and mouthpiece or mask with dish soap and hot water  Shake off the excess water and let the parts air dry  You can also attach the nebulizer pieces to the machine  Turn the machine on to dry the nebulizer quickly  Make sure all pieces are completely dry before storing them away  · Disinfect every other day after treatment  Soak the nebulizer parts, except the mask, in 1 part diluted white vinegar and 3 parts hot water  Let them soak for 1 hour  Rinse the parts, shake off excess water, and let them air dry  You can also attach the nebulizer pieces to the machine  Turn the machine on to dry the nebulizer quickly  Make sure all pieces are completely dry before storing them away      Follow up with your healthcare provider as directed:  Write down your questions so you remember to ask them during your visits  © Copyright [address] 2021 Information is for End User's use only and may not be sold, redistributed or otherwise used for commercial purposes  All illustrations and images included in CareNotes® are the copyrighted property of [employer]  or Phyllis Nowak  The above information is an  only  It is not intended as medical advice for individual conditions or treatments  Talk to your doctor, nurse or pharmacist before following any medical regimen to see if it is safe and effective for you  Budesonide/Formoterol (By breathing)   Budesonide (cvc-LXM-ux-nide), Formoterol Fumarate (for-HALLEY-ter-ol FUE-ma-rate)  Treats asthma and chronic obstructive pulmonary disease (COPD)  Also reduces the worsening attacks of COPD  This medicine contains a corticosteroid  Brand Name(s): Symbicort 160/4 5, Symbicort 80/4 5   There may be other brand names for this medicine  When This Medicine Should Not Be Used: This medicine is not right for everyone  Do not use this medicine if you had an allergic reaction to budesonide or formoterol  Do not use this medicine if your asthma attack has already started, or if you are having a severe asthma attack or COPD flare-up  How to Use This Medicine:   Liquid Under Pressure, Powder  · Take your medicine as directed  Your dose may need to be changed several times to find what works best for you  Never use more medicine than your doctor prescribed  · Read and follow the patient instructions that come with this medicine  Talk to your doctor or pharmacist if you have any questions  · You will use this medicine with a device called a metered-dose inhaler  The inhaler fits on the medicine canister and turns the medicine into a fine spray that you breathe in through your mouth and to your lungs  You may be told to use a spacer, which is a tube that is placed between the inhaler and your mouth   Your caregiver will show you how to use your inhaler and the spacer (if needed)  · Test spray in the air before using for the first time or if the inhaler has not been used for a while  Prime the inhaler before using it for the first time  Shake well for 5 seconds, then spray into the air, away from your face  Shake and spray a second time  It is now ready for use  If the inhaler has been dropped or has not been used for more than 7 days, prime it again before use  · Shake the inhaler well just before each use  Avoid spraying this medicine into your eyes  · Remove the cap and look at the mouthpiece to make sure it is clean  · To inhale this medicine, breathe out fully, trying to get as much air out of the lungs as possible  Put the mouthpiece just in front of your mouth with the canister upright  · Open your mouth and breathe in slowly and deeply (like yawning), and at the same time firmly press down on the top of the canister once  · Hold your breath for about 5 to 10 seconds, and then breathe out slowly  · If you are supposed to use more than one puff, wait 1 to 2 minutes before inhaling the second puff  Repeat these steps for the next puff, starting with shaking the inhaler  · The dose counter at the top of the inhaler will turn yellow when the inhaler has 20 or fewer doses left  Throw the inhaler when it reaches 0, or if it has been 3 months since you last opened the foil pouch  · When you have finished all your inhalations, rinse your mouth out with water  Do not swallow the water after rinsing  · Missed dose: Take a dose as soon as you remember  If it is almost time for your next dose, wait until then and take a regular dose  Do not take extra medicine to make up for a missed dose  · Store the canister at room temperature, away from heat and direct light  Do not freeze  Do not keep this medicine inside a car where it could be exposed to extreme heat or cold   Do not poke holes in the canister or throw it into a fire, even if the canister is empty  Store the inhaler with the mouthpiece down  · Ask your pharmacist, doctor, or health caregiver about the best way to dispose of the used medicine container and any leftover medicine  You will also need to throw away old medicine after the expiration date has passed  Drugs and Foods to Avoid:   Ask your doctor or pharmacist before using any other medicine, including over-the-counter medicines, vitamins, and herbal products  · Some medicines can affect how budesonide/formoterol works  Tell your doctor if you are using any of the following:  ? Blood pressure medicine (including atenolol, labetalol)  ? Diuretic (water pill, including furosemide, hydrochlorothiazide [HCTZ])  ? Medicine to treat depression or an MAO inhibitor within the past 2 weeks  ? Medicine to treat HIV or AIDS (including atazanavir, indinavir, nelfinavir, ritonavir, saquinavir)  ? Medicine to treat infection (including clarithromycin, erythromycin, itraconazole, ketoconazole, telithromycin)  Warnings While Using This Medicine:   · Tell your doctor if you are pregnant or breastfeeding, or if you have liver disease, bone problems (including osteoporosis), heart or blood vessel disease, heart rhythm problems, high blood pressure, seizures, thyroid problems, diabetes, any kind of infection (including tuberculosis or herpes infection of the eye), eye problems (including cataracts or glaucoma), or if you have a weak immune system  Tell your doctor if you have ever been exposed to chickenpox or measles  · This medicine may cause the following problems:  ? Increased risk of asthma-related hospital stays, intubations, and death  ? Increased risk of pneumonia in people who have COPD  ? Adrenal gland problems  ? Increased trouble breathing right after use (paradoxical bronchospasm)  ? Low bone mineral density, which may lead to osteoporosis  ? Slow growth in children  ? Glaucoma or cataracts  ?  Increased risk of infection, including fungus infection in the mouth (thrush)  · This medicine will not stop an asthma attack that has already started  You should have another medicine to use in case of an acute asthma attack  · If any of your asthma medicines do not seem to be working as well as usual, call your doctor right away  Do not change your doses or stop using your medicines without asking your doctor  · Call your doctor if your symptoms do not improve or if they get worse  · You may need to use this medicine for 1 to 2 weeks before your asthma starts to get better  · Your doctor will check your progress and the effects of this medicine at regular visits  Keep all appointments  · Keep all medicine out of the reach of children  Never share your medicine with anyone  Possible Side Effects While Using This Medicine:   Call your doctor right away if you notice any of these side effects:  · Allergic reaction: Itching or hives, swelling in your face or hands, swelling or tingling in your mouth or throat, chest tightness, trouble breathing  · Chest pain, troubled breathing, tightness in the chest, worsening of breathing problems  · Color changes on the skin, dark freckles, easy bruising, muscle weakness, round or puffy face  · Dry mouth, increased thirst, or muscle cramps  · Eye pain or vision changes  · Fast, pounding, or uneven heartbeat  · Fever, chills, cough, runny or stuffy nose, sore throat, body aches  · Tiredness, weakness, nausea, vomiting, dizziness  · Tremors, nervousness, or shaking  If you notice these less serious side effects, talk with your doctor:   · Headache  · Sores or white patches in your mouth or throat, pain when eating or swallowing  If you notice other side effects that you think are caused by this medicine, tell your doctor  Call your doctor for medical advice about side effects   You may report side effects to FDA at 4-698-FDA-3447  © Copyright [employer] 2021 Information is for End User's use only and may not be sold, redistributed or otherwise used for commercial purposes  The above information is an  only  It is not intended as medical advice for individual conditions or treatments  Talk to your doctor, nurse or pharmacist before following any medical regimen to see if it is safe and effective for you  Levalbuterol (By breathing)   Levalbuterol (gvm-ljy-VHB-ter-ol)  Treats or prevents bronchospasm  Brand Name(s): Xopenex, Xopenex HFA, Xopenex Pediatric   There may be other brand names for this medicine  When This Medicine Should Not Be Used: This medicine is not right for everyone  Do not use it if you had an allergic reaction to levalbuterol or albuterol  How to Use This Medicine:   Spray, Liquid  · Your doctor will tell you how much medicine to use  Do not use more than directed  · Read and follow the patient instructions that come with this medicine  Talk to your doctor or pharmacist if you have any questions  · Do not swallow or inject the medicine  Only inhale the medicine  · Check the solution concentrate in the vial  It should be clear or colorless  Do not use it if is discolored  Mix the solution concentrate first with sterile normal saline before you take your dose using a nebulizer  · You will use this medicine with an inhaler device called a nebulizer  The nebulizer turns the medicine into a fine mist that you breathe in through your mouth and to your lungs  Your caregiver will show you how to use your nebulizer  Do not mix levalbuterol with other medicines in a nebulizer, unless told to do so by your doctor  · Aerosol inhaler:   ? You will use this medicine with a device called a metered-dose inhaler  The inhaler fits on the medicine canister and turns the medicine into a fine spray that you breathe in through your mouth and to your lungs  You may be told to use a spacer, which is a tube that is placed between the inhaler and your mouth   Your caregiver will show you how to use your inhaler and the spacer (if needed)  ? Shake the inhaler well just before each use  Avoid spraying this medicine into your eyes  ? Remove the cap and look at the mouthpiece to make sure it is clean  ? Prime the inhaler the first time you use it  Point the inhaler away from your face  Spray into the air 4 times  You also need to prime it when you do not use the inhaler for more than 3 days in a row  ? To inhale this medicine, breathe out fully, trying to get as much air out of the lungs as possible  Put the mouthpiece just in front of your mouth with the canister upright  ? Open your mouth and breathe in slowly and deeply (like yawning), and at the same time firmly press down on the top of the canister once  ? Hold your breath for about 5 to 10 seconds, and then breathe out slowly  ? If you are supposed to use more than one puff, wait 1 to 2 minutes before inhaling the second puff  Repeat these steps for the next puff, starting with shaking the inhaler  ? Clean the inhaler with warm water and air dry it thoroughly  Do this at least once a week  · Missed dose: Take a dose as soon as you remember  If it is almost time for your next dose, wait until then and take a regular dose  Do not take extra medicine to make up for a missed dose  · Solution (for nebulizer): Keep the medicine in the foil pouch until you are ready to use it  Store the vials at room temperature, away from heat and direct light  The vials in the pouch must be used within 2 weeks after you open the foil pouch  · Solution concentrate (for nebulizer): Keep the medicine in the foil pouch at room temperature, away from heat and direct light, until you are ready to use it  Use the vial right away after you open the foil pouch  · Inhaler: Store the canister at room temperature, away from heat and direct light  Do not freeze  Do not keep this medicine inside a car where it could be exposed to extreme heat or cold   Do not poke holes in the canister or throw it into a fire, even if the canister is empty  · Inhaler: Throw away the canister after either 200 sprays have been used  Ask your pharmacist if you are not sure  Do not place the canister in water to see if the canister is full (float test)  Drugs and Foods to Avoid:   Ask your doctor or pharmacist before using any other medicine, including over-the-counter medicines, vitamins, and herbal products  · This medicine should not be used together with similar inhaled medicines, including albuterol, isoproterenol, metaproterenol, pirbuterol, or terbutaline  · Some medicines can affect how levalbuterol works  Tell your doctor if you are using any of the following:  ? Digoxin  ? Blood pressure medicine  ? Diuretic (water pill)  ? Medicine for depression (including an MAO inhibitor or TCA during treatment or within 2 weeks after the last dose)  Warnings While Using This Medicine:   · Tell your doctor if you are pregnant or breastfeeding, or if you have kidney disease, diabetes, heart disease, heart rhythm problems, high blood pressure, thyroid problems, or seizures  · This medicine may cause increased trouble breathing (paradoxical bronchospasm), which may be life-threatening  · If you use a corticosteroid medicine to control your asthma, keep using it as instructed by your doctor  · If any of your asthma medicines do not seem to be working as well as usual, call your doctor right away  Do not change your doses or stop using your medicines without asking your doctor  · Your doctor will check your progress and the effects of this medicine at regular visits  Keep all appointments  · Keep all medicine out of the reach of children  Never share your medicine with anyone    Possible Side Effects While Using This Medicine:   Call your doctor right away if you notice any of these side effects:  · Allergic reaction: Itching or hives, swelling in your face or hands, swelling or tingling in your mouth or throat, chest tightness, trouble breathing  · Chest pain, fast, pounding, or uneven heartbeat  · Dry mouth, increased thirst, muscle cramps, nausea, vomiting  · Increased difficulty with breathing  · Tremors or shaking  If you notice these less serious side effects, talk with your doctor:   · Diarrhea  · Dizziness, nervousness  · Headache  · Runny nose, sore throat  If you notice other side effects that you think are caused by this medicine, tell your doctor  Call your doctor for medical advice about side effects  You may report side effects to FDA at 2-373-ZHC-1414  © Copyright [employer] 2021 Information is for End User's use only and may not be sold, redistributed or otherwise used for commercial purposes  The above information is an  only  It is not intended as medical advice for individual conditions or treatments  Talk to your doctor, nurse or pharmacist before following any medical regimen to see if it is safe and effective for you  Heart Failure   WHAT YOU NEED TO KNOW:   Heart failure is a condition that does not allow your heart to fill or pump properly  Not enough oxygen in your blood gets to your organs and tissues  Fluid may not move through your body properly  Fluid builds up and causes swelling and trouble breathing  This is known as congestive heart failure  Heart failure may start in the left or right ventricle  Heart failure is often caused by damage or injury to your heart  The damage may be caused by other heart problems, diabetes, or high blood pressure  The damage may have also been caused by an infection  Heart failure is a long-term condition that tends to get worse over time  It is important to manage your health to improve your quality of life  DISCHARGE INSTRUCTIONS:   Call your local emergency number (911 in the [address] [place] [address],[address]) if:   · You have any of the following signs of a heart attack:      ?  Squeezing, pressure, or pain in your chest    ? You may  also have any of the following:     § Discomfort or pain in your back, neck, jaw, stomach, or arm    § Shortness of breath    § Nausea or vomiting    § Lightheadedness or a sudden cold sweat      Call your doctor if:   · Your heartbeat is fast, slow, or uneven all the time  · You have symptoms of worsening heart failure:      ? Shortness of breath at rest, at night, or that is getting worse in any way    ? Weight gain of 3 or more pounds (1 4 kg) in a day, or more than your healthcare provider says is okay    ? More swelling in your legs or ankles    ? Abdominal pain or swelling    ? More coughing    ? Loss of appetite    ? Feeling tired all the time    · You feel hopeless or depressed, or you have lost interest in things you used to enjoy  · You often feel worried or afraid  · You have questions or concerns about your condition or care  Medicines:   · Medicines  may be given to help regulate your heart rhythm and lower your blood pressure  You may also need medicines to help decrease extra fluids  Medicines, such as NSAIDs, may be stopped if they are causing your heart failure to become worse  Do not stop any of your medicines on your own  · Take your medicine as directed  Contact your healthcare provider if you think your medicine is not helping or if you have side effects  Tell him or her if you are allergic to any medicine  Keep a list of the medicines, vitamins, and herbs you take  Include the amounts, and when and why you take them  Bring the list or the pill bottles to follow-up visits  Carry your medicine list with you in case of an emergency  Go to cardiac rehab if directed:  Cardiac rehab is a program run by specialists who will help you safely strengthen your heart  In the program you will learn about exercise, relaxation, stress management, and heart-healthy nutrition  Manage swelling from extra fluid:   · Elevate (raise) your legs above the level of your heart  This will help with fluid that builds up in your legs or ankles  Elevate your legs as often as possible during the day  Prop your legs on pillows or blankets to keep them elevated comfortably  Try not to stand for long periods of time during the day  Move around to keep your blood circulating  · Limit sodium (salt)  Ask how much sodium you can have each day  Your healthcare provider may give you a limit, such as 2,300 milligrams (mg) a day  Your provider or a dietitian can teach you how to read food labels for the number of mg in a food  He or she can also help you find ways to have less salt  For example, if you add salt to food as you cook, do not add more at the table  · Drink liquids as directed  You may need to limit the amount of liquid you drink within 24 hours  Your healthcare provider will tell you how much liquid to have and which liquids are best for you  He or she may tell you to limit liquid to 1 5 to 2 liters in a day  He or she will also tell you how often to drink liquid throughout the day  · Weigh yourself every morning  Use the same scale, in the same spot  Do this after you use the bathroom, but before you eat or drink  Wear the same type of clothing each time  Write down your weight and call your healthcare provider if you have a sudden weight gain  Swelling and weight gain are signs of fluid buildup  Manage heart failure: Your quality of life may improve with treatment and the following:  · Do not smoke  Nicotine and other chemicals in cigarettes and cigars can cause lung and heart damage  Ask your healthcare provider for information if you currently smoke and need help to quit  E-cigarettes or smokeless tobacco still contain nicotine  Talk to your healthcare provider before you use these products  · Do not drink alcohol or use illegal drugs  Alcohol and drugs can increase your risk for high blood pressure, diabetes, and coronary artery disease      · Eat heart-healthy foods  Heart-healthy foods include fruits, vegetables, lean meat (such as beef, chicken, or pork), and low-fat dairy products  Fatty fish such as salmon and tuna are also heart healthy  Other heart-healthy foods include walnuts, whole-grain breads, beans, and cooked beans  Replace butter and margarine with heart-healthy oils such as olive oil or canola oil  Your provider or a dietitian can help you create heart-healthy meal plans  · Manage any chronic health conditions you have  These include high blood pressure, diabetes, obesity, high cholesterol, metabolic syndrome, and COPD  You will have fewer symptoms if you manage these health conditions  Follow your healthcare provider's recommendations and follow up with him or her regularly  · Maintain a healthy weight  Being overweight can increase your risk for high blood pressure, diabetes, and coronary artery disease  These conditions can make your symptoms worse  Ask your healthcare provider how much you should weigh  Ask him or her to help you create a weight loss plan if you are overweight  · Stay active  Activity can help keep your symptoms from getting worse  Walking is a type of physical activity that helps maintain your strength and improve your mood  Physical activity also helps you manage your weight  Work with your healthcare provider to create an exercise plan that is right for you  · Get vaccines as directed  The flu and pneumonia can be severe for a person who has heart failure  Vaccines protect you from these infections  Get a flu shot every year as soon as it is recommended, usually in September or October  You may also need the pneumonia vaccine  Your healthcare provider can tell you if you need other vaccines, and when to get them  Follow up with your doctor within 7 days and as directed: You may need to return for other tests  You may need home health care   A healthcare provider will monitor your vital signs, weight, and make sure your medicines are working  Write down your questions so you remember to ask them during your visits  Join a support group:  Heart failure can be difficult to manage  It may be helpful to talk with others who have heart failure  You may learn how to better manage your condition or get emotional support  For more information:  · [address]  [place] , [place]   Phone: [phone]  Web Address: https://www strong [employer]/  [facility] [address] 2021 Information is for End User's use only and may not be sold, redistributed or otherwise used for commercial purposes  All illustrations and images included in CareNotes® are the copyrighted property of A D A M , Inc  or [address] [place], [place] [address]son   The above information is an  only  It is not intended as medical advice for individual conditions or treatments  Talk to your doctor, nurse or pharmacist before following any medical regimen to see if it is safe and effective for you  Low-Sodium Diet   WHAT YOU NEED TO KNOW:   A low-sodium diet limits foods that are high in sodium (salt)  You will need to follow a low-sodium diet if you have high blood pressure, kidney disease, or heart failure  You may also need to follow this diet if you have a condition that is causing your body to retain (hold) extra fluid  You may need to limit the amount of sodium you eat in a day to 1,500 to 2,000 mg  Ask your healthcare provider how much sodium you can have each day  DISCHARGE INSTRUCTIONS:   How to use food labels to choose foods that are low in sodium:  Read food labels to find the amount of sodium they contain  The amount of sodium is listed in milligrams (mg)  The % Daily Value (DV) column tells you how much of your daily needs are met by 1 serving of the food for each nutrient listed  Choose foods that have less than 5% of the DV of sodium  These foods are considered low in sodium   Foods that have 20% or more of the DV of sodium are considered high in sodium  Some food labels may also list any of the following terms that tell you about the sodium content in the food:  · Sodium-free:  Less than 5 mg in each serving    · Very low sodium:  35 mg of sodium or less in each serving    · Low sodium:  140 mg of sodium or less in each serving    · Reduced sodium: At least 25% less sodium in each serving than the regular type    · Light in sodium:  50% less sodium in each serving    · Unsalted or no added salt:  No extra salt is added during processing (the food may still contain sodium)     Foods to avoid:  Salty foods are high in sodium  You should avoid the following:  · Processed foods:      ? Mixes for cornbread, biscuits, cake, and pudding     ? Instant foods, such as potatoes, cereals, noodles, and rice     ? Packaged foods, such as bread stuffing, rice and pasta mixes, snack dip mixes, and macaroni and cheese     ? Canned foods, such as canned vegetables, soups, broths, sauces, and vegetable or tomato juice    ? Snack foods, such as salted chips, popcorn, pretzels, pork rinds, salted crackers, and salted nuts    ? Frozen foods, such as dinners, entrees, vegetables with sauces, and breaded meats    ? Sauerkraut, pickled vegetables, and other foods prepared in brine    · Meats and cheeses:      ? Smoked or cured meat, such as corned beef, pinon, ham, hot dogs, and sausage    ? Canned meats or spreads, such as potted meats, sardines, anchovies, and imitation seafood    ? Deli or lunch meats, such as bologna, ham, turkey, and roast beef    ? Processed cheese, such as American cheese and cheese spreads    · Condiments, sauces, and seasonings:      ? Salt (¼ teaspoon of salt contains 575 mg of sodium)    ? Seasonings made with salt, such as garlic salt, celery salt, onion salt, and seasoned salt    ?  Regular soy sauce, barbecue sauce, teriyaki sauce, steak sauce, Worcestershire sauce, and most flavored vinegars    ? Canned gravy and mixes     ? Regular condiments, such as mustard, ketchup, and salad dressings    ? Pickles and olives    ? Meat tenderizers and monosodium glutamate (MSG)    Foods to include:  Read the food label to find the amount of sodium in each serving  · Bread and cereal:  Try to choose breads with less than 80 mg of sodium per serving  ? Bread, roll, suresh, tortilla, or unsalted crackers  ? Ready-to-eat cereals with less than 5% DV of sodium (examples include shredded wheat and puffed rice)    ? Pasta    · Vegetables and fruits:      ? Unsalted fresh, frozen, or canned vegetables    ? Fresh, frozen, or canned fruits    ? Fruit juice    · Dairy:  One serving has about 150 mg of sodium  ? Milk, all types    ? Yogurt    ? Hard cheese, such as cheddar, Swiss, [employer], or mozzarella    · Meat and other protein foods:  Some raw meats may have added sodium  ? Plain meats, fish, and poultry     ? Egg    · Other foods:      ? Homemade pudding    ? Unsalted nuts, popcorn, or pretzels    ? Unsalted butter or margarine    Ways to decrease sodium:   · Add spices and herbs to foods instead of salt during cooking  Use salt-free seasonings to add flavor to foods  Examples include onion powder, garlic powder, basil, denny powder, paprika, and parsley  Try lemon or lime juice or vinegar to give foods a tart flavor  Use hot peppers, pepper, or cayenne pepper to add a spicy flavor  · Do not keep a salt shaker at your kitchen table  This may help keep you from adding salt to food at the table  A teaspoon of salt has 2,300 mg of sodium  It may take time to get used to enjoying the natural flavor of food instead of adding salt  Talk to your healthcare provider before you use salt substitutes  Some salt substitutes have a high amount of potassium and need to be avoided if you have kidney disease  · Choose low-sodium foods at restaurants  Meals from restaurants are often high in sodium  Some restaurants have nutrition information on the menu that tells you the amount of sodium in their foods  If possible, ask for your food to be prepared with less, or no salt  · Shop for unsalted or low-sodium foods and snacks at the grocery store  Examples include unsalted or low-sodium broths, soups, and canned vegetables  Choose fresh or frozen vegetables instead  Choose unsalted nuts or seeds or fresh fruits or vegetables as snacks  Read food labels and choose salt-free, very low-sodium, or low-sodium foods  © Copyright [employer] 2021 Information is for End User's use only and may not be sold, redistributed or otherwise used for commercial purposes  All illustrations and images included in CareNotes® are the copyrighted property of A D A M , Inc  or Phyllis Nowak  The above information is an  only  It is not intended as medical advice for individual conditions or treatments  Talk to your doctor, nurse or pharmacist before following any medical regimen to see if it is safe and effective for you

## 2021-07-22 NOTE — UTILIZATION REVIEW
Continued Stay Review    Date: 07/22/2021                        Current Patient Class: Inpatient  Current Level of Care: Med/Surf    HPI:56 y o  male initially admitted on 07/19/2021  Acute asthma exacerbation  Assessment/Plan:  Continue prednisone and bronchodilator therapy  Continue on PO lasix  (up titrate to 40) daily  Continue pulmonary toillet  Check ambulatory pulse ox        VTE Pharmacologic Prophylaxis:   Pharmacologic: Enoxaparin (Lovenox)  Mechanical VTE Prophylaxis in Place: No    Vital Signs: /82   Pulse 92   Temp 98 1 °F (36 7 °C)   Resp 14   Ht 6' 1" (1 854 m)   Wt (!) 163 kg (360 lb)   SpO2 94%   BMI 47 50 kg/m²     Pertinent Labs/Diagnostic Results:     Results from last 7 days   Lab Units 07/21/21  0505 07/19/21  0415 07/18/21  1815 07/15/21  1515   WBC Thousand/uL 9 71 5 96 9 40 7 19   HEMOGLOBIN g/dL 14 1 13 3 13 5 14 7   HEMATOCRIT % 41 4 38 7 39 0 44 0   PLATELETS Thousands/uL 204 211 252 273   NEUTROS ABS Thousands/µL  --  5 47 8 23* 4 56     Results from last 7 days   Lab Units 07/22/21  0500 07/21/21  0505 07/20/21  0521 07/19/21  0415 07/18/21  1815   SODIUM mmol/L 137 131* 132* 133* 134*   POTASSIUM mmol/L 3 6 4 1 3 8 4 4 3 9   CHLORIDE mmol/L 99* 95* 95* 99* 101   CO2 mmol/L 31 29 29 26 23   ANION GAP mmol/L 7 7 8 8 10   BUN mg/dL 32* 33* 29* 20 19   CREATININE mg/dL 1 13 1 28 1 13 1 14 1 02   EGFR ml/min/1 73sq m 73 63 73 73 83   CALCIUM mg/dL 8 5 8 8 8 8 8 6 8 5   MAGNESIUM mg/dL  --   --  2 1 1 7  --    PHOSPHORUS mg/dL  --   --   --  3 6  --      Results from last 7 days   Lab Units 07/18/21  1815 07/15/21  1515   AST U/L 16 19   ALT U/L 20 20   ALK PHOS U/L 89 106   TOTAL PROTEIN g/dL 6 7 7 6   ALBUMIN g/dL 2 9* 3 4*   TOTAL BILIRUBIN mg/dL 0 41 0 63     Results from last 7 days   Lab Units 07/22/21  1050 07/22/21  0607 07/21/21  2034 07/21/21  1616 07/21/21  1053 07/21/21  0621 07/20/21  2040 07/20/21  1556 07/20/21  1047 07/20/21  0549 07/19/21  2030 07/19/21  1632   POC GLUCOSE mg/dl 158* 121 168* 277* 228* 267* 192* 198* 430* 336* 312* 358*     Results from last 7 days   Lab Units 07/22/21  0500 07/21/21  0505 07/20/21  0521 07/19/21  0415 07/18/21  1815 07/15/21  1515   GLUCOSE RANDOM mg/dL 143* 290* 346* 361* 381* 169*     Results from last 7 days   Lab Units 07/18/21  2312   HEMOGLOBIN A1C % 7 1*   EAG mg/dl 157     Results from last 7 days   Lab Units 07/18/21  2312   PH OMERO  7 481*   PCO2 OMERO mm Hg 32 5*   PO2 OMERO mm Hg 66 5*   HCO3 OMERO mmol/L 23 7*   BASE EXC OMERO mmol/L 0 9   O2 CONTENT OMERO ml/dL 18 3   O2 HGB, VENOUS % 92 9*     Results from last 7 days   Lab Units 07/19/21  0415 07/19/21  0059 07/18/21  2312 07/18/21  1815 07/15/21  1515   TROPONIN I ng/mL <0 02 <0 02 <0 02 <0 02 <0 02     Results from last 7 days   Lab Units 07/19/21  0059   D-DIMER QUANTITATIVE ug/ml FEU 0 32     Results from last 7 days   Lab Units 07/19/21  0415   TSH 3RD GENERATON uIU/mL 1 100     Results from last 7 days   Lab Units 07/18/21  1815   NT-PRO BNP pg/mL 605*     Results from last 7 days   Lab Units 07/18/21  2318   CLARITY UA  Clear   COLOR UA  Yellow   SPEC GRAV UA  1 026   PH UA  5 5   GLUCOSE UA mg/dl >=1000 (1%)*   KETONES UA mg/dl Trace*   BLOOD UA  Negative   PROTEIN UA mg/dl Negative   NITRITE UA  Negative   BILIRUBIN UA  Negative   UROBILINOGEN UA E U /dl 0 2   LEUKOCYTES UA  Elevated glucose may cause decreased leukocyte values   See urine microscopic for [facility] result/*   WBC UA /hpf None Seen   RBC UA /hpf None Seen   BACTERIA UA /hpf Occasional   EPITHELIAL CELLS WET PREP /hpf None Seen   MUCUS THREADS  None Seen     Medications:   Scheduled Medications:  atorvastatin, 10 mg, Oral, After Dinner  budesonide, 0 5 mg, Nebulization, Q12H  enoxaparin, 40 mg, Subcutaneous, Daily  furosemide, 40 mg, Oral, Daily  guaiFENesin, 600 mg, Oral, BID  insulin glargine, 18 Units, Subcutaneous, HS  insulin lispro, 1-5 Units, Subcutaneous, HS  insulin lispro, 2-12 Units, Subcutaneous, TID AC  insulin lispro, 7 Units, Subcutaneous, TID With Meals  levalbuterol, 1 25 mg, Nebulization, TID   And  sodium chloride, 3 mL, Nebulization, TID  lisinopril, 30 mg, Oral, Daily  montelukast, 10 mg, Oral, HS  multivitamin stress formula, 1 tablet, Oral, Daily  predniSONE, 40 mg, Oral, Daily      Continuous IV Infusions:     PRN Meds:  acetaminophen, 650 mg, Oral, Q6H PRN  albuterol, 2 5 mg, Nebulization, Q4H PRN  aluminum-magnesium hydroxide-simethicone, 30 mL, Oral, Q6H PRN  docusate sodium, 100 mg, Oral, BID PRN  ondansetron, 4 mg, Intravenous, Q6H PRN        Discharge Plan: [facility]    Network Utilization Review Department  ATTENTION: Please call with any questions or concerns to [phone] and carefully listen to the prompts so that you are directed to the right person  All voicemails are confidential   Elsa Peterson all requests for admission clinical reviews, approved or denied determinations and any other requests to dedicated fax number below belonging to the campus where the patient is receiving treatment   List of dedicated fax numbers for the Facilities:  [address] DENIALS (Administrative/Medical Necessity) [phone]   [address] (Maternity/NICU/Pediatrics) [phone]   [address] Red Snyder 75[address] [id number] [place] [address] Berkley Katz [address] [facility] [phone]

## 2021-07-22 NOTE — PROGRESS NOTES
Progress Note - Pulmonary   Paralee Alejandro 49 y o  male MRN: [id number]  Unit/Bed#: [facility] 401-01 Encounter: [id number]      Assessment & Plan:  1  Moderate persistent asthma in acute exacerbation              Plan for home regimen:   Prednisone 40 mg po daily, taper by 10 mg every 3 days               Ventolin, Symbicort, and Montelukast   Xopenex neb                Outpatient pulmonary follow up for PFTs and consider biologics     2  Decreased left ventricular systolic function              Echo 7/20/21 showed EF 50% with evidence of cardiomyopathy              Lasix 40 mg po daily                Outpatient cardiology follow up and stress test     3  Obstructive sleep apnea              CPAP at home                4  Hypertension             Lisinopril 30 mg po and Lasix 40 mg po     5  Hyperlipidemia              Lipitor 10 mg      6  Type II diabetes              Follow up with PCP    Subjective:   No acute events overnight  Patient states his shortness of breath has improved and he is ready to be discharged  He continues to have chronic cough  He denies fever, chills, chest pain, or abdominal pain  Discussed his medications and plans to follow up with pulmonary  Review of Systems   Constitutional: Negative for chills and fever  HENT: Negative for ear pain and sore throat  Eyes: Negative for pain and visual disturbance  Respiratory: Positive for cough  Negative for shortness of breath  Cardiovascular: Negative for chest pain and palpitations  Gastrointestinal: Negative for abdominal pain and vomiting  Genitourinary: Negative for dysuria and hematuria  Musculoskeletal: Positive for back pain  Negative for arthralgias  Skin: Negative for color change and rash  Neurological: Negative for seizures and syncope  All other systems reviewed and are negative        Objective:     Vitals:    07/21/21 2000 07/21/21 2225 07/22/21 0649 07/22/21 0700   BP:  153/83  153/82   BP Location:       Pulse:  77 92   Resp:  19  14   Temp:  98 1 °F (36 7 °C)     TempSrc:       SpO2: 95% 92% 97% 96%   Weight:       Height:               Intake/Output Summary (Last 24 hours) at 7/22/2021 0748  Last data filed at 7/22/2021 0601  Gross per 24 hour   Intake 1501 ml   Output 1600 ml   Net -99 ml         Physical Exam  Vitals and nursing note reviewed  Constitutional:       Appearance: He is well-developed  HENT:      Head: Normocephalic and atraumatic  Eyes:      Conjunctiva/sclera: Conjunctivae normal    Cardiovascular:      Rate and Rhythm: Normal rate and regular rhythm  Heart sounds: No murmur heard  Pulmonary:      Effort: Pulmonary effort is normal  No respiratory distress  Breath sounds: Wheezing present  Abdominal:      Palpations: Abdomen is soft  Tenderness: There is no abdominal tenderness  Musculoskeletal:      Cervical back: Neck supple  Skin:     General: Skin is warm and dry  Neurological:      Mental Status: He is alert  Labs: I have personally reviewed pertinent lab results    Results from last 7 days   Lab Units 07/21/21  0505 07/19/21  0415 07/18/21  1815 07/15/21  1515   WBC Thousand/uL 9 71 5 96 9 40 7 19   HEMOGLOBIN g/dL 14 1 13 3 13 5 14 7   HEMATOCRIT % 41 4 38 7 39 0 44 0   PLATELETS Thousands/uL 204 211 252 273   NEUTROS PCT %  --  93* 88* 63   MONOS PCT %  --  1* 4 9      Results from last 7 days   Lab Units 07/22/21  0500 07/21/21  0505 07/20/21  0521 07/18/21  1815 07/15/21  1515   POTASSIUM mmol/L 3 6 4 1 3 8 3 9 3 5   CHLORIDE mmol/L 99* 95* 95* 101 102   CO2 mmol/L 31 29 29 23 27   BUN mg/dL 32* 33* 29* 19 16   CREATININE mg/dL 1 13 1 28 1 13 1 02 1 25   CALCIUM mg/dL 8 5 8 8 8 8 8 5 8 8   ALK PHOS U/L  --   --   --  89 106   ALT U/L  --   --   --  20 20   AST U/L  --   --   --  16 19     Results from last 7 days   Lab Units 07/20/21  0521 07/19/21  0415   MAGNESIUM mg/dL 2 1 1 7     Results from last 7 days   Lab Units 07/19/21  0415   PHOSPHORUS mg/dL 3 6 0   Lab Value Date/Time    TROPONINI <0 02 07/19/2021 0415    TROPONINI <0 02 07/19/2021 0059    TROPONINI <0 02 07/18/2021 2312    TROPONINI <0 02 07/18/2021 1815    TROPONINI <0 02 07/15/2021 1515         Imaging and other studies: I have personally reviewed pertinent reports

## 2021-07-22 NOTE — ASSESSMENT & PLAN NOTE
ECHO: LVEF 40-45% in March 2020  Never seen by cardiology  On Lasix 20 mg daily by PCP  · Cardiology following  · Troponin negative   · Updated ECHO shows: LVEF 50%, akinesis of basal inferior and basal-mid inferolateral walls, G1DD, IVC was mildly dilated   · Given Lasix to 40 mg IV x1   · Cardiology up titrated home Lasix to 40 mg daily   · Outpatient Cardiology follow-up planned with possible stress testing at that point    · OK per cardiology for discharge

## 2021-07-22 NOTE — DISCHARGE SUMMARY
[address] [facility]  Discharge- Theresa Allen 1965, 49 y o  male MRN: [id number]  Unit/Bed#: [facility] 401-01 Encounter: [id number]  Primary Care Provider: Abhijit Guzman MD   Date and time admitted to hospital: 7/18/2021  5:39 PM    * Moderate persistent asthma with acute exacerbation  Assessment & Plan  Presented to ED on 7/15 for SOB  Was discharged on Proventil and Prednisone  Presents with worsening SOB despite outpatient treatment   D dimer unremarkable   Pulmonary following  Per pulm ok with discharge with following inhaler regimen: symbicort, singulair, xopenex, prn ventolin  Nebulizer machine provided  D/c on steroid taper  Will need outpatient pulmonary follow-up for PFTs and consideration for biologics   Weaned to room air, s/p ambulatory pulse ox    Cardiomyopathy [facility])  Assessment & Plan  ECHO: LVEF 40-45% in March 2020  Never seen by cardiology  On Lasix 20 mg daily by PCP  · Cardiology following  · Troponin negative   · Updated ECHO shows: LVEF 50%, akinesis of basal inferior and basal-mid inferolateral walls, G1DD, IVC was mildly dilated   · Given Lasix to 40 mg IV x1   · Cardiology up titrated home Lasix to 40 mg daily   · Outpatient Cardiology follow-up planned with possible stress testing at that point  · OK per cardiology for discharge    VICKY (obstructive sleep apnea)  Assessment & Plan  Continue CPAP at bedtime  Type 2 diabetes mellitus with hyperglycemia [facility])  Assessment & Plan  Lab Results   Component Value Date    HGBA1C 7 1 (H) 07/18/2021     Recent Labs     07/21/21  1616 07/21/21  2034 07/22/21  0607 07/22/21  1050   POCGLU 277* 168* 121 158*       Blood Sugar Average: Last 72 hrs:  (P) 268 6   Upon discharge resume on metformin and januvia  Had not been taking the metformin, communicated to patient that he should resume  As anticipated improved cbg with steroid taper    Pt encouraged to continue cbg monitoring    Essential hypertension  Assessment & Plan  Stable,  Cont to monitor       Discharging Physician / Practitioner: Damian Gonzalez DO  PCP: Miguel Angel Begum MD  Admission Date:   Admission Orders (From admission, onward)     Ordered        07/18/21 2042  Inpatient Admission  Once                   Discharge Date: 07/22/21    Medical Problems     Resolved Problems  Date Reviewed: 7/22/2021    None                Consultations During Hospital Stay:  · Pulmonary  · Cardiology    Procedures Performed:   · Echo    Significant Findings / Test Results:   · See above    Incidental Findings:   ·     Test Results Pending at Discharge (will require follow up):   ·      Outpatient Tests Requested:  ·     Complications:      Reason for Admission: Asthma exacerbation    Hospital Course:     HPI: Galileo Clark is a 49 y o  male who has past medical history significant for morbid obesity, essential hypertension, diabetes mellitus not on insulin, irritability bowel syndrome, former cigar smoker, obstructive sleep apnea on CPAP, and mixed hyperlipidemia comes in for shortness of breath  According to the patient, this has been going on for approximately 3 to 4 days now, and was seen here in the emergency room on July 15 with complaints of fevers as high as 100° F  He also has cough and congestion with postnasal drip  Patient reports that he has always been inside the house with air conditioning on and the house windows are still closed  Noted is that patient was recently diagnosed to have an ejection fraction which is reduced seen at 40 to 45%  However, there was no note of pulmonary hypertension or diastolic dysfunction or pulmonary valve dysfunction  Patient is supposed to see Cardiology in the future but has not yet been seen  Likewise, the patient states that he has shortness of breath with note of chest tightness however current troponin is less than 0 02  Here in the emergency room, he received nebulization of DuoNeb with not much improvement    By physical examination has overall wheezing and rhonchi with no note of crackles and the chest x-ray does not seem to be consistent with failure  Instead, it has air trapping and airspace disease      Patient states that he cannot sleep lying flat on bed  Noted is that he was placed on Lasix at the time that he was diagnosed to have a reduced ejection fraction and currently his swelling has gone down with no note of any recent weight gain  He does not have any leg edema as well  Please see above list of diagnoses and related plan for additional information  Condition at Discharge: stable     Discharge Day Visit / Exam:     Subjective:  Dyspnea improved  Off oxygen even with ambulation  No acute events overnght  Vitals: Blood Pressure: 153/82 (07/22/21 0700)  Pulse: 92 (07/22/21 0700)  Temperature: 98 1 °F (36 7 °C) (07/21/21 2225)  Temp Source: Oral (07/21/21 0701)  Respirations: 14 (07/22/21 0700)  Height: 6' 1" (185 4 cm) (07/19/21 0558)  Weight - Scale: (!) 163 kg (360 lb) (07/18/21 1739)  SpO2: 94 % (07/22/21 1305)  Exam:   Physical Exam  Constitutional:       Appearance: He is obese  Cardiovascular:      Rate and Rhythm: Normal rate and regular rhythm  Pulses: Normal pulses  Heart sounds: Normal heart sounds  Pulmonary:      Effort: Pulmonary effort is normal  No respiratory distress  Breath sounds: Wheezing present  No rales  Comments: Minimal Scattered exp wheezing  Abdominal:      General: Abdomen is flat  Bowel sounds are normal  There is no distension  Palpations: Abdomen is soft  Tenderness: There is no abdominal tenderness  There is no guarding  Musculoskeletal:         General: Normal range of motion  Cervical back: Normal range of motion and neck supple  Right lower leg: No edema  Left lower leg: No edema  Skin:     General: Skin is warm and dry  Neurological:      General: No focal deficit present        Mental Status: He is alert and oriented to person, place, and time  Mental status is at baseline  Cranial Nerves: No cranial nerve deficit  Motor: No weakness  Discussion with Family: Patient; called his wife no answer left msg    Discharge instructions/Information to patient and family:   See after visit summary for information provided to patient and family  Provisions for Follow-Up Care:  See after visit summary for information related to follow-up care and any pertinent home health orders  Disposition:     Home    For Discharges to [facility] SNF:   · Not Applicable to this Patient - Not Applicable to this Patient    Planned Readmission:      Discharge Statement:  I spent > 30 minutes discharging the patient  This time was spent on the day of discharge  I had direct contact with the patient on the day of discharge  Greater than 50% of the total time was spent examining patient, answering all patient questions, arranging and discussing plan of care with patient as well as directly providing post-discharge instructions  Additional time then spent on discharge activities  Discharge Medications:  See after visit summary for reconciled discharge medications provided to patient and family        ** Please Note: This note has been constructed using a voice recognition system **

## 2021-07-23 LAB
DME PARACHUTE DELIVERY DATE ACTUAL: NORMAL
DME PARACHUTE DELIVERY DATE REQUESTED: NORMAL
DME PARACHUTE ITEM DESCRIPTION: NORMAL
DME PARACHUTE ORDER STATUS: NORMAL
DME PARACHUTE SUPPLIER NAME: NORMAL
DME PARACHUTE SUPPLIER PHONE: NORMAL

## 2021-07-31 PROBLEM — E87.1 HYPONATREMIA: Status: ACTIVE | Noted: 2021-07-31

## 2021-08-02 ENCOUNTER — OFFICE VISIT (OUTPATIENT)
Dept: FAMILY MEDICINE CLINIC | Facility: CLINIC | Age: 56
End: 2021-08-02
Payer: COMMERCIAL

## 2021-08-02 VITALS
SYSTOLIC BLOOD PRESSURE: 140 MMHG | RESPIRATION RATE: 16 BRPM | HEART RATE: 100 BPM | BODY MASS INDEX: 41.75 KG/M2 | WEIGHT: 315 LBS | DIASTOLIC BLOOD PRESSURE: 80 MMHG | HEIGHT: 73 IN | TEMPERATURE: 98.6 F

## 2021-08-02 DIAGNOSIS — I42.9 CARDIOMYOPATHY, UNSPECIFIED TYPE (HCC): ICD-10-CM

## 2021-08-02 DIAGNOSIS — G47.33 OSA (OBSTRUCTIVE SLEEP APNEA): ICD-10-CM

## 2021-08-02 DIAGNOSIS — I10 ESSENTIAL HYPERTENSION: ICD-10-CM

## 2021-08-02 DIAGNOSIS — E11.65 TYPE 2 DIABETES MELLITUS WITH HYPERGLYCEMIA, WITHOUT LONG-TERM CURRENT USE OF INSULIN (HCC): ICD-10-CM

## 2021-08-02 DIAGNOSIS — E87.1 HYPONATREMIA: ICD-10-CM

## 2021-08-02 DIAGNOSIS — E78.2 MIXED HYPERLIPIDEMIA: ICD-10-CM

## 2021-08-02 DIAGNOSIS — J45.41 MODERATE PERSISTENT ASTHMA WITH ACUTE EXACERBATION: Primary | ICD-10-CM

## 2021-08-02 DIAGNOSIS — E66.01 MORBID OBESITY (HCC): ICD-10-CM

## 2021-08-02 PROBLEM — J45.20 MILD INTERMITTENT ASTHMA WITHOUT COMPLICATION: Status: RESOLVED | Noted: 2019-04-18 | Resolved: 2021-08-02

## 2021-08-02 PROCEDURE — 99496 TRANSJ CARE MGMT HIGH F2F 7D: CPT | Performed by: FAMILY MEDICINE

## 2021-08-02 PROCEDURE — 1111F DSCHRG MED/CURRENT MED MERGE: CPT | Performed by: FAMILY MEDICINE

## 2021-08-02 NOTE — PROGRESS NOTES
Chief Complaint   Patient presents with    Transition of Care Management     Moderate persistent asthma with acute exacerbation      Health Maintenance   Topic Date Due    Pneumococcal Vaccine: Pediatrics (0 to 5 Years) and At-Risk Patients (6 to 59 Years) (1 of 2 - PPSV23) Never done    HIV Screening  Never done    Colorectal Cancer Screening  Never done    Influenza Vaccine (1) 09/01/2021    HEMOGLOBIN A1C  01/18/2022    Depression Screening PHQ  05/14/2022    BMI: Followup Plan  05/14/2022    Annual Physical  05/14/2022    Diabetic Foot Exam  05/14/2022    BMI: Adult  07/18/2022    DM Eye Exam  11/04/2022    DTaP,Tdap,and Td Vaccines (2 - Td or Tdap) 06/09/2028    Hepatitis C Screening  Completed    COVID-19 Vaccine  Completed    HIB Vaccine  Aged Out    Hepatitis B Vaccine  Aged Out    IPV Vaccine  Aged Out    Hepatitis A Vaccine  Aged Out    Meningococcal ACWY Vaccine  Aged Out    HPV Vaccine  Aged Out         Assessment/Plan:     No problem-specific Assessment & Plan notes found for this encounter  Diagnoses and all orders for this visit:    Moderate persistent asthma with acute exacerbation    Cardiomyopathy, unspecified type ([address] )    Essential hypertension    Type 2 diabetes mellitus with hyperglycemia, without long-term current use of insulin (HCC)    VICKY (obstructive sleep apnea)    Mixed hyperlipidemia    Morbid obesity (HCC)    Hyponatremia         Subjective:     Patient ID: Tory Rocha is a 49 y o  male  HPI    Review of Systems      Objective:     Physical Exam      Vitals:    08/02/21 1126   BP: 140/80   Pulse: 100   Resp: 16   Temp: 98 6 °F (37 °C)   TempSrc: Tympanic   Weight: (!) 157 kg (347 lb)   Height: 6' 1" (1 854 m)       Transitional Care Management Review:  Tory Rocha is a 49 y o  male here for TCM follow up       During the TCM phone call patient stated:    TCM Call (since 7/2/2021)     Date and time call was made  7/22/2021  6:53 PM    Hospital care reviewed  Records reviewed    Patient was hospitialized at  [facility]        Date of Admission  07/18/21    Date of discharge  07/22/21    Diagnosis  Moderate persistent asthma with acute exacerbation    Disposition  Home    Were the patients medications reviewed and updated  Yes    Current Symptoms  None      TCM Call (since 7/2/2021)     Post hospital issues  None    Should patient be enrolled in anticoag monitoring?   No    Did you obtain your prescribed medications  Yes    Do you need help managing your prescriptions or medications  No    Is transportation to your appointment needed  No    I have advised the patient to call PCP with any new or worsening symptoms  [facility] [address] or [employer] other    Support System  Spouse    The type of support provided  Physical; Financial; Emotional    Do you have social support  Yes, as much as I need    Are you recieving any outpatient services  No    Are you recieving home care services  No    Are you using any community resources  No    Current waiver services  No    Have you fallen in the last 12 months  No    Interperter language line needed  No    Counseling  Patient          Jordon Toledo MD Asthma  h/o asthma in the past , not on meds  Depression    Diabetes  diet controlled, not on meds  Glaucoma    Lumbar Disc Disease  lumbar disc displacement  Migraine Headache    Ovarian Cancer  in 2005 had chemo  Pneumonia  h/o pneumonia in the past  Sleep Apnea  uses CPAP

## 2021-08-03 NOTE — ASSESSMENT & PLAN NOTE
Lab Results   Component Value Date    HGBA1C 7 1 (H) 07/18/2021     Patient reports elevated blood sugar readings due to recent treatment with steroids for asthma exacerbation  Restart Metformin 500 mg 1 tablet twice daily  Continue Januvia 100 mg daily  Check blood sugar at home twice daily and send in blood sugar log for review in 2 weeks  Follow a low carb diet, continue to work on weight reduction  Check eye exam by ophthalmologist annually

## 2021-08-03 NOTE — PROGRESS NOTES
Assessment/Plan:    Patient presents for TCM visit  He was hospitalized at [address] [place] [address] 7/18/21 -7/22/21 acute asthma exacerbation, cardiomyopathy  Moderate persistent asthma with acute exacerbation  Patient completed Prednisone course this morning  Reports improvement in cough and chest tightness  C/o exertional shortness of breath, some wheezing  Continue to use Symbicort 160/4 5 mcg 2 puffs twice daily, Xopenex 1 25 mg via nebulizer 3 times daily  Schedule follow-up office visit with pulmonology, check PFT's  Cardiomyopathy ([place] [id number] )  Echocardiogram showed EF 18%, grade 1 diastolic dysfunction, akinesis of basal inferior and basal mid- inferolateral walls  Continue Lasix 40 mg daily, Lisinopril  Follow a low-sodium diet  Schedule follow-up visit with cardiology  Essential hypertension  BP remains stable  Goal for /80  Continue Lisinopril 30 mg daily  Type 2 diabetes mellitus with hyperglycemia (HCC)    Lab Results   Component Value Date    HGBA1C 7 1 (H) 07/18/2021     Patient reports elevated blood sugar readings due to recent treatment with steroids for asthma exacerbation  Restart Metformin 500 mg 1 tablet twice daily  Continue Januvia 100 mg daily  Check blood sugar at home twice daily and send in blood sugar log for review in 2 weeks  Follow a low carb diet, continue to work on weight reduction  Check eye exam by ophthalmologist annually  VICKY (obstructive sleep apnea)  Continue to use CPAP at night  Mixed hyperlipidemia  Continue Atorvastatin 10 mg daily  Morbid obesity ([place] [id number] )  Patient lost 13 lb since last visit in May  Continue to work on weight reduction  Hyponatremia  Sodium level in the hospital was low at 131 likely to hyperglycemia  Recheck BMP in 2-3 weeks  Schedule follow-up visit in 2 months         Diagnoses and all orders for this visit:    Moderate persistent asthma with acute exacerbation    Cardiomyopathy, unspecified type [facility])    Essential hypertension  -     Basic metabolic panel; Future    Type 2 diabetes mellitus with hyperglycemia, without long-term current use of insulin (HCC)  -     metFORMIN (GLUCOPHAGE) 500 mg tablet; Take 1 tablet (500 mg total) by mouth 2 (two) times a day After meals    VICKY (obstructive sleep apnea)    Mixed hyperlipidemia    Morbid obesity (HCC)    Hyponatremia  -     Basic metabolic panel; Future          Subjective:      Patient ID: Melvina Bence is a 49 y o  male  HPI     Patient presents for TCM visit  He was hospitalized at [address] [place] [address] 7/18/21 -7/22/21 acute asthma exacerbation, cardiomyopathy  Reviewed hospital records, test results, discharge medication with patient  Patient presented to ER with worsening shortness of breath, chest tightness, wheezing  He was seen in consultation by pulmonology and cardiology  Echocardiogram showed EF 41%, grade 1 diastolic dysfunction, akinesis of basal inferior and basal mid- inferolateral walls  Dose of Lasix was increased to 40 mg daily  Patient was recommended to schedule follow-up visit with cardiology as outpatient for possible stress testing  Patient was discharged home on Prednisone taper  Completed Prednisone course this morning  He uses Xopenex 1 25 mg via nebulizer 3 times per day, Symbicort 160/4 5 mcg  2 puffs twice daily  C/o some wheezing, exertional SOB  Chest tightness, cough improved  Reports no chest pain  HTN - patient takes Lisinopril 30 mg daily  BP at home 136-140/86-88  VICKY - using CPAP at night  Type 2 DM -  Hb A1C 7 1  Patient reports elevated blood sugar readings due to treatment with Prednisone  Requesting refill for Mtformin 500 mg 1 tablet twice daily  Takes Januvia 100 mg daily  Reviewed blood test done on July 21, 2021  CBC with dif -  within normal range  Sodium 131, creatinine 1 28, potassium 4 1  Glucose 290  Hyperlipidemia- on Atorvastatin 10 mg daily  Morbid obesity - patient lost 13 lb since May 2021  Denies tobacco use  Patient would like to apply for disability  The following portions of the patient's history were reviewed and updated as appropriate: allergies, past family history, past medical history, past social history, past surgical history and problem list     Review of Systems   Constitutional: Positive for fatigue  Negative for activity change, appetite change, chills and fever  HENT: Negative for congestion, ear pain, facial swelling, hearing loss, mouth sores, sore throat, tinnitus and trouble swallowing  Eyes: Negative  Respiratory: Positive for cough (improved ), chest tightness (improved), shortness of breath (exertional ) and wheezing  Cardiovascular: Positive for leg swelling (mild)  Negative for chest pain and palpitations  Gastrointestinal: Negative for abdominal pain, blood in stool, constipation, diarrhea, nausea and vomiting  Genitourinary: Negative for difficulty urinating, dysuria, flank pain, frequency and hematuria  Musculoskeletal: Positive for arthralgias (left knee) and back pain  Negative for gait problem, joint swelling and neck pain  Skin: Negative for rash  Neurological: Negative for dizziness, syncope and headaches  Hematological: Negative  Psychiatric/Behavioral: Positive for sleep disturbance  Negative for dysphoric mood  The patient is not nervous/anxious  Objective:      /80   Pulse 100   Temp 98 6 °F (37 °C) (Tympanic)   Resp 16   Ht 6' 1" (1 854 m)   Wt (!) 157 kg (347 lb)   BMI 45 78 kg/m²          Physical Exam  Vitals and nursing note reviewed  Constitutional:       Appearance: Normal appearance  Comments: Morbidly obese   HENT:      Head: Normocephalic and atraumatic        Right Ear: Tympanic membrane normal       Left Ear: Tympanic membrane normal    Eyes:      Conjunctiva/sclera: Conjunctivae normal       Pupils: Pupils are equal, round, and reactive to light  Neck:      Vascular: No carotid bruit  Cardiovascular:      Rate and Rhythm: Regular rhythm  Tachycardia present  Heart sounds: No murmur heard  Comments: Trace BL LE pretibial edema  Pulmonary:      Effort: Pulmonary effort is normal       Comments: Few wheezes  Abdominal:      General: There is no distension  Palpations: Abdomen is soft  Tenderness: There is no abdominal tenderness  Musculoskeletal:         General: No swelling  Cervical back: Normal range of motion and neck supple  Skin:     General: Skin is warm and dry  Comments: Psoriatic patch on right knee, behind left ear   Neurological:      Mental Status: He is alert     Psychiatric:         Mood and Affect: Mood normal

## 2021-08-03 NOTE — ASSESSMENT & PLAN NOTE
Echocardiogram showed EF 99%, grade 1 diastolic dysfunction, akinesis of basal inferior and basal mid- inferolateral walls  Continue Lasix 40 mg daily, Lisinopril  Follow a low-sodium diet  Schedule follow-up visit with cardiology

## 2021-08-03 NOTE — ASSESSMENT & PLAN NOTE
Patient completed Prednisone course this morning  Reports improvement in cough and chest tightness  C/o exertional shortness of breath, some wheezing  Continue to use Symbicort 160/4 5 mcg 2 puffs twice daily, Xopenex 1 25 mg via nebulizer 3 times daily  Schedule follow-up office visit with pulmonology, check PFT's

## 2021-08-04 ENCOUNTER — TELEPHONE (OUTPATIENT)
Dept: FAMILY MEDICINE CLINIC | Facility: CLINIC | Age: 56
End: 2021-08-04

## 2021-08-04 DIAGNOSIS — E11.65 TYPE 2 DIABETES MELLITUS WITH HYPERGLYCEMIA, WITHOUT LONG-TERM CURRENT USE OF INSULIN (HCC): ICD-10-CM

## 2021-08-04 RX ORDER — LANCETS 33 GAUGE
EACH MISCELLANEOUS
Qty: 200 EACH | Refills: 3 | Status: SHIPPED | OUTPATIENT
Start: 2021-08-04 | End: 2021-08-05 | Stop reason: CLARIF

## 2021-08-04 RX ORDER — BLOOD-GLUCOSE METER
EACH MISCELLANEOUS
Qty: 1 KIT | Refills: 0 | Status: SHIPPED | OUTPATIENT
Start: 2021-08-04 | End: 2021-08-05

## 2021-08-04 NOTE — TELEPHONE ENCOUNTER
----- Message from Kenton Jeter MD sent at 8/3/2021  4:57 PM EDT -----  Please call patient  Patient requested a new prescription for glucometer, test strips ,lancets  Please place order and I will sign  Recommend patient to check BMP in 2 weeks  Order in chart  Start checking blood sugars twice daily and call with blood sugar log in 2 weeks

## 2021-09-10 DIAGNOSIS — E11.65 TYPE 2 DIABETES MELLITUS WITH HYPERGLYCEMIA, WITHOUT LONG-TERM CURRENT USE OF INSULIN (HCC): ICD-10-CM

## 2021-09-10 DIAGNOSIS — E78.2 MIXED HYPERLIPIDEMIA: ICD-10-CM

## 2021-09-10 RX ORDER — ATORVASTATIN CALCIUM 10 MG/1
TABLET, FILM COATED ORAL
Qty: 30 TABLET | Refills: 5 | Status: SHIPPED | OUTPATIENT
Start: 2021-09-10 | End: 2022-05-18 | Stop reason: SDUPTHER

## 2021-09-10 RX ORDER — SITAGLIPTIN 100 MG/1
TABLET, FILM COATED ORAL
Qty: 30 TABLET | Refills: 1 | Status: SHIPPED | OUTPATIENT
Start: 2021-09-10 | End: 2021-11-22

## 2021-09-22 ENCOUNTER — HOSPITAL ENCOUNTER (INPATIENT)
Facility: HOSPITAL | Age: 56
LOS: 4 days | Discharge: HOME | DRG: 202 | End: 2021-09-26
Attending: EMERGENCY MEDICINE | Admitting: INTERNAL MEDICINE
Payer: COMMERCIAL

## 2021-09-22 ENCOUNTER — APPOINTMENT (EMERGENCY)
Dept: RADIOLOGY | Facility: HOSPITAL | Age: 56
DRG: 202 | End: 2021-09-22
Payer: COMMERCIAL

## 2021-09-22 DIAGNOSIS — J45.901 ASTHMA EXACERBATION: Primary | ICD-10-CM

## 2021-09-22 DIAGNOSIS — N17.9 ACUTE RENAL FAILURE, UNSPECIFIED ACUTE RENAL FAILURE TYPE (HCC): ICD-10-CM

## 2021-09-22 LAB
ALBUMIN SERPL BCP-MCNC: 2.8 G/DL (ref 3.5–5)
ALP SERPL-CCNC: 94 U/L (ref 46–116)
ALT SERPL W P-5'-P-CCNC: 12 U/L (ref 12–78)
ANION GAP SERPL CALCULATED.3IONS-SCNC: 6 MMOL/L (ref 4–13)
AST SERPL W P-5'-P-CCNC: 10 U/L (ref 5–45)
BASOPHILS # BLD AUTO: 0.1 THOUSANDS/ΜL (ref 0–0.1)
BASOPHILS NFR BLD AUTO: 1 % (ref 0–1)
BILIRUB SERPL-MCNC: 0.34 MG/DL (ref 0.2–1)
BUN SERPL-MCNC: 32 MG/DL (ref 5–25)
CALCIUM ALBUM COR SERPL-MCNC: 9.4 MG/DL (ref 8.3–10.1)
CALCIUM SERPL-MCNC: 8.4 MG/DL (ref 8.3–10.1)
CHLORIDE SERPL-SCNC: 107 MMOL/L (ref 100–108)
CO2 SERPL-SCNC: 26 MMOL/L (ref 21–32)
CREAT SERPL-MCNC: 1.65 MG/DL (ref 0.6–1.3)
EOSINOPHIL # BLD AUTO: 1.12 THOUSAND/ΜL (ref 0–0.61)
EOSINOPHIL NFR BLD AUTO: 13 % (ref 0–6)
ERYTHROCYTE [DISTWIDTH] IN BLOOD BY AUTOMATED COUNT: 13.5 % (ref 11.6–15.1)
GFR SERPL CREATININE-BSD FRML MDRD: 46 ML/MIN/1.73SQ M
GLUCOSE SERPL-MCNC: 115 MG/DL (ref 65–140)
HCT VFR BLD AUTO: 37 % (ref 36.5–49.3)
HGB BLD-MCNC: 11.9 G/DL (ref 12–17)
IMM GRANULOCYTES # BLD AUTO: 0.06 THOUSAND/UL (ref 0–0.2)
IMM GRANULOCYTES NFR BLD AUTO: 1 % (ref 0–2)
LYMPHOCYTES # BLD AUTO: 1.42 THOUSANDS/ΜL (ref 0.6–4.47)
LYMPHOCYTES NFR BLD AUTO: 17 % (ref 14–44)
MCH RBC QN AUTO: 31.2 PG (ref 26.8–34.3)
MCHC RBC AUTO-ENTMCNC: 32.2 G/DL (ref 31.4–37.4)
MCV RBC AUTO: 97 FL (ref 82–98)
MONOCYTES # BLD AUTO: 0.57 THOUSAND/ΜL (ref 0.17–1.22)
MONOCYTES NFR BLD AUTO: 7 % (ref 4–12)
NEUTROPHILS # BLD AUTO: 5.27 THOUSANDS/ΜL (ref 1.85–7.62)
NEUTS SEG NFR BLD AUTO: 61 % (ref 43–75)
NRBC BLD AUTO-RTO: 0 /100 WBCS
NT-PROBNP SERPL-MCNC: 235 PG/ML
PLATELET # BLD AUTO: 256 THOUSANDS/UL (ref 149–390)
PMV BLD AUTO: 9.7 FL (ref 8.9–12.7)
POTASSIUM SERPL-SCNC: 4.9 MMOL/L (ref 3.5–5.3)
PROT SERPL-MCNC: 6.6 G/DL (ref 6.4–8.2)
RBC # BLD AUTO: 3.81 MILLION/UL (ref 3.88–5.62)
SODIUM SERPL-SCNC: 139 MMOL/L (ref 136–145)
TROPONIN I SERPL-MCNC: <0.02 NG/ML
WBC # BLD AUTO: 8.54 THOUSAND/UL (ref 4.31–10.16)

## 2021-09-22 PROCEDURE — 83880 ASSAY OF NATRIURETIC PEPTIDE: CPT

## 2021-09-22 PROCEDURE — 94644 CONT INHLJ TX 1ST HOUR: CPT

## 2021-09-22 PROCEDURE — 99285 EMERGENCY DEPT VISIT HI MDM: CPT

## 2021-09-22 PROCEDURE — 84484 ASSAY OF TROPONIN QUANT: CPT

## 2021-09-22 PROCEDURE — 80053 COMPREHEN METABOLIC PANEL: CPT

## 2021-09-22 PROCEDURE — 99285 EMERGENCY DEPT VISIT HI MDM: CPT | Performed by: EMERGENCY MEDICINE

## 2021-09-22 PROCEDURE — 36415 COLL VENOUS BLD VENIPUNCTURE: CPT

## 2021-09-22 PROCEDURE — 85025 COMPLETE CBC W/AUTO DIFF WBC: CPT

## 2021-09-22 PROCEDURE — U0003 INFECTIOUS AGENT DETECTION BY NUCLEIC ACID (DNA OR RNA); SEVERE ACUTE RESPIRATORY SYNDROME CORONAVIRUS 2 (SARS-COV-2) (CORONAVIRUS DISEASE [COVID-19]), AMPLIFIED PROBE TECHNIQUE, MAKING USE OF HIGH THROUGHPUT TECHNOLOGIES AS DESCRIBED BY CMS-2020-01-R: HCPCS

## 2021-09-22 PROCEDURE — U0005 INFEC AGEN DETEC AMPLI PROBE: HCPCS

## 2021-09-22 PROCEDURE — 71046 X-RAY EXAM CHEST 2 VIEWS: CPT

## 2021-09-22 PROCEDURE — 94760 N-INVAS EAR/PLS OXIMETRY 1: CPT

## 2021-09-22 PROCEDURE — 96365 THER/PROPH/DIAG IV INF INIT: CPT

## 2021-09-22 PROCEDURE — 93005 ELECTROCARDIOGRAM TRACING: CPT

## 2021-09-22 RX ORDER — ALBUTEROL SULFATE 2.5 MG/3ML
5 SOLUTION RESPIRATORY (INHALATION) ONCE
Status: COMPLETED | OUTPATIENT
Start: 2021-09-22 | End: 2021-09-22

## 2021-09-22 RX ORDER — SODIUM CHLORIDE FOR INHALATION 0.9 %
12 VIAL, NEBULIZER (ML) INHALATION ONCE
Status: COMPLETED | OUTPATIENT
Start: 2021-09-22 | End: 2021-09-22

## 2021-09-22 RX ORDER — MAGNESIUM SULFATE HEPTAHYDRATE 40 MG/ML
2 INJECTION, SOLUTION INTRAVENOUS ONCE
Status: COMPLETED | OUTPATIENT
Start: 2021-09-22 | End: 2021-09-22

## 2021-09-22 RX ORDER — METHYLPREDNISOLONE SODIUM SUCCINATE 125 MG/2ML
80 INJECTION, POWDER, LYOPHILIZED, FOR SOLUTION INTRAMUSCULAR; INTRAVENOUS ONCE
Status: COMPLETED | OUTPATIENT
Start: 2021-09-22 | End: 2021-09-23

## 2021-09-22 RX ADMIN — ALBUTEROL SULFATE 5 MG: 2.5 SOLUTION RESPIRATORY (INHALATION) at 22:45

## 2021-09-22 RX ADMIN — ALBUTEROL SULFATE 10 MG: 2.5 SOLUTION RESPIRATORY (INHALATION) at 23:52

## 2021-09-22 RX ADMIN — IPRATROPIUM BROMIDE 0.5 MG: 0.5 SOLUTION RESPIRATORY (INHALATION) at 22:45

## 2021-09-22 RX ADMIN — ISODIUM CHLORIDE 12 ML: 0.03 SOLUTION RESPIRATORY (INHALATION) at 23:52

## 2021-09-22 RX ADMIN — METHYLPREDNISOLONE SODIUM SUCCINATE 80 MG: 125 INJECTION, POWDER, FOR SOLUTION INTRAMUSCULAR; INTRAVENOUS at 23:58

## 2021-09-22 RX ADMIN — IPRATROPIUM BROMIDE 1 MG: 0.5 SOLUTION RESPIRATORY (INHALATION) at 23:51

## 2021-09-22 RX ADMIN — MAGNESIUM SULFATE HEPTAHYDRATE 2 G: 40 INJECTION, SOLUTION INTRAVENOUS at 23:07

## 2021-09-23 PROBLEM — N17.9 ACUTE RENAL FAILURE (ARF) (HCC): Status: ACTIVE | Noted: 2021-09-23

## 2021-09-23 LAB
ANION GAP SERPL CALCULATED.3IONS-SCNC: 9 MMOL/L (ref 4–13)
ATRIAL RATE: 99 BPM
BUN SERPL-MCNC: 33 MG/DL (ref 5–25)
CALCIUM SERPL-MCNC: 8.3 MG/DL (ref 8.3–10.1)
CHLORIDE SERPL-SCNC: 104 MMOL/L (ref 100–108)
CO2 SERPL-SCNC: 21 MMOL/L (ref 21–32)
CREAT SERPL-MCNC: 1.85 MG/DL (ref 0.6–1.3)
ERYTHROCYTE [DISTWIDTH] IN BLOOD BY AUTOMATED COUNT: 13.5 % (ref 11.6–15.1)
GFR SERPL CREATININE-BSD FRML MDRD: 40 ML/MIN/1.73SQ M
GLUCOSE SERPL-MCNC: 247 MG/DL (ref 65–140)
GLUCOSE SERPL-MCNC: 262 MG/DL (ref 65–140)
GLUCOSE SERPL-MCNC: 282 MG/DL (ref 65–140)
GLUCOSE SERPL-MCNC: 289 MG/DL (ref 65–140)
HCT VFR BLD AUTO: 36.7 % (ref 36.5–49.3)
HGB BLD-MCNC: 11.9 G/DL (ref 12–17)
MCH RBC QN AUTO: 31.4 PG (ref 26.8–34.3)
MCHC RBC AUTO-ENTMCNC: 32.4 G/DL (ref 31.4–37.4)
MCV RBC AUTO: 97 FL (ref 82–98)
P AXIS: -41 DEGREES
PLATELET # BLD AUTO: 228 THOUSANDS/UL (ref 149–390)
PMV BLD AUTO: 9.9 FL (ref 8.9–12.7)
POTASSIUM SERPL-SCNC: 5.3 MMOL/L (ref 3.5–5.3)
PR INTERVAL: 184 MS
QRS AXIS: 74 DEGREES
QRSD INTERVAL: 58 MS
QT INTERVAL: 338 MS
QTC INTERVAL: 433 MS
RBC # BLD AUTO: 3.79 MILLION/UL (ref 3.88–5.62)
SARS-COV-2 RNA RESP QL NAA+PROBE: NEGATIVE
SODIUM SERPL-SCNC: 134 MMOL/L (ref 136–145)
T WAVE AXIS: 81 DEGREES
VENTRICULAR RATE: 99 BPM
WBC # BLD AUTO: 8.02 THOUSAND/UL (ref 4.31–10.16)

## 2021-09-23 PROCEDURE — 82948 REAGENT STRIP/BLOOD GLUCOSE: CPT

## 2021-09-23 PROCEDURE — 36415 COLL VENOUS BLD VENIPUNCTURE: CPT | Performed by: INTERNAL MEDICINE

## 2021-09-23 PROCEDURE — 80048 BASIC METABOLIC PNL TOTAL CA: CPT | Performed by: INTERNAL MEDICINE

## 2021-09-23 PROCEDURE — 94640 AIRWAY INHALATION TREATMENT: CPT

## 2021-09-23 PROCEDURE — 85027 COMPLETE CBC AUTOMATED: CPT | Performed by: INTERNAL MEDICINE

## 2021-09-23 PROCEDURE — 93010 ELECTROCARDIOGRAM REPORT: CPT | Performed by: INTERNAL MEDICINE

## 2021-09-23 PROCEDURE — 99223 1ST HOSP IP/OBS HIGH 75: CPT | Performed by: INTERNAL MEDICINE

## 2021-09-23 PROCEDURE — 99233 SBSQ HOSP IP/OBS HIGH 50: CPT | Performed by: PHYSICIAN ASSISTANT

## 2021-09-23 PROCEDURE — 94760 N-INVAS EAR/PLS OXIMETRY 1: CPT

## 2021-09-23 RX ORDER — LEVALBUTEROL 1.25 MG/.5ML
1.25 SOLUTION, CONCENTRATE RESPIRATORY (INHALATION) EVERY 6 HOURS PRN
Status: DISCONTINUED | OUTPATIENT
Start: 2021-09-23 | End: 2021-09-26 | Stop reason: HOSPADM

## 2021-09-23 RX ORDER — BUDESONIDE 0.5 MG/2ML
0.5 INHALANT ORAL
Status: DISCONTINUED | OUTPATIENT
Start: 2021-09-23 | End: 2021-09-26 | Stop reason: HOSPADM

## 2021-09-23 RX ORDER — LEVALBUTEROL 1.25 MG/.5ML
1.25 SOLUTION, CONCENTRATE RESPIRATORY (INHALATION)
Status: DISCONTINUED | OUTPATIENT
Start: 2021-09-23 | End: 2021-09-26 | Stop reason: HOSPADM

## 2021-09-23 RX ORDER — METHYLPREDNISOLONE SODIUM SUCCINATE 40 MG/ML
40 INJECTION, POWDER, LYOPHILIZED, FOR SOLUTION INTRAMUSCULAR; INTRAVENOUS EVERY 8 HOURS SCHEDULED
Status: DISCONTINUED | OUTPATIENT
Start: 2021-09-23 | End: 2021-09-24

## 2021-09-23 RX ORDER — ALBUTEROL SULFATE 90 UG/1
2 AEROSOL, METERED RESPIRATORY (INHALATION) EVERY 4 HOURS PRN
Status: DISCONTINUED | OUTPATIENT
Start: 2021-09-23 | End: 2021-09-26 | Stop reason: HOSPADM

## 2021-09-23 RX ORDER — HEPARIN SODIUM 5000 [USP'U]/ML
7500 INJECTION, SOLUTION INTRAVENOUS; SUBCUTANEOUS EVERY 8 HOURS SCHEDULED
Status: DISCONTINUED | OUTPATIENT
Start: 2021-09-23 | End: 2021-09-26 | Stop reason: HOSPADM

## 2021-09-23 RX ORDER — ONDANSETRON 2 MG/ML
4 INJECTION INTRAMUSCULAR; INTRAVENOUS EVERY 6 HOURS PRN
Status: DISCONTINUED | OUTPATIENT
Start: 2021-09-23 | End: 2021-09-26 | Stop reason: HOSPADM

## 2021-09-23 RX ORDER — SODIUM CHLORIDE FOR INHALATION 0.9 %
3 VIAL, NEBULIZER (ML) INHALATION
Status: DISCONTINUED | OUTPATIENT
Start: 2021-09-23 | End: 2021-09-26 | Stop reason: HOSPADM

## 2021-09-23 RX ORDER — HEPARIN SODIUM 5000 [USP'U]/ML
5000 INJECTION, SOLUTION INTRAVENOUS; SUBCUTANEOUS EVERY 8 HOURS SCHEDULED
Status: DISCONTINUED | OUTPATIENT
Start: 2021-09-23 | End: 2021-09-23 | Stop reason: DRUGHIGH

## 2021-09-23 RX ORDER — BUDESONIDE AND FORMOTEROL FUMARATE DIHYDRATE 160; 4.5 UG/1; UG/1
2 AEROSOL RESPIRATORY (INHALATION) 2 TIMES DAILY
Status: DISCONTINUED | OUTPATIENT
Start: 2021-09-23 | End: 2021-09-23

## 2021-09-23 RX ORDER — ATORVASTATIN CALCIUM 10 MG/1
10 TABLET, FILM COATED ORAL
Status: DISCONTINUED | OUTPATIENT
Start: 2021-09-23 | End: 2021-09-26 | Stop reason: HOSPADM

## 2021-09-23 RX ORDER — FUROSEMIDE 10 MG/ML
40 INJECTION INTRAMUSCULAR; INTRAVENOUS ONCE
Status: COMPLETED | OUTPATIENT
Start: 2021-09-23 | End: 2021-09-23

## 2021-09-23 RX ORDER — FUROSEMIDE 40 MG/1
40 TABLET ORAL DAILY
Status: DISCONTINUED | OUTPATIENT
Start: 2021-09-23 | End: 2021-09-24

## 2021-09-23 RX ORDER — MONTELUKAST SODIUM 10 MG/1
10 TABLET ORAL
Status: DISCONTINUED | OUTPATIENT
Start: 2021-09-23 | End: 2021-09-26 | Stop reason: HOSPADM

## 2021-09-23 RX ORDER — SODIUM CHLORIDE 9 MG/ML
75 INJECTION, SOLUTION INTRAVENOUS CONTINUOUS
Status: DISPENSED | OUTPATIENT
Start: 2021-09-23 | End: 2021-09-23

## 2021-09-23 RX ORDER — SODIUM CHLORIDE FOR INHALATION 0.9 %
3 VIAL, NEBULIZER (ML) INHALATION EVERY 6 HOURS PRN
Status: DISCONTINUED | OUTPATIENT
Start: 2021-09-23 | End: 2021-09-26 | Stop reason: HOSPADM

## 2021-09-23 RX ORDER — ACETAMINOPHEN 325 MG/1
650 TABLET ORAL EVERY 6 HOURS PRN
Status: DISCONTINUED | OUTPATIENT
Start: 2021-09-23 | End: 2021-09-26 | Stop reason: HOSPADM

## 2021-09-23 RX ADMIN — HEPARIN SODIUM 7500 UNITS: 5000 INJECTION INTRAVENOUS; SUBCUTANEOUS at 05:54

## 2021-09-23 RX ADMIN — BUDESONIDE 0.5 MG: 0.5 INHALANT ORAL at 19:19

## 2021-09-23 RX ADMIN — BUDESONIDE 0.5 MG: 0.5 INHALANT ORAL at 08:15

## 2021-09-23 RX ADMIN — ISODIUM CHLORIDE 3 ML: 0.03 SOLUTION RESPIRATORY (INHALATION) at 08:15

## 2021-09-23 RX ADMIN — LEVALBUTEROL HYDROCHLORIDE 1.25 MG: 1.25 SOLUTION, CONCENTRATE RESPIRATORY (INHALATION) at 14:33

## 2021-09-23 RX ADMIN — LEVALBUTEROL HYDROCHLORIDE 1.25 MG: 1.25 SOLUTION, CONCENTRATE RESPIRATORY (INHALATION) at 19:19

## 2021-09-23 RX ADMIN — FUROSEMIDE 40 MG: 40 TABLET ORAL at 08:14

## 2021-09-23 RX ADMIN — SODIUM CHLORIDE 75 ML/HR: 0.9 INJECTION, SOLUTION INTRAVENOUS at 09:50

## 2021-09-23 RX ADMIN — MONTELUKAST 10 MG: 10 TABLET, FILM COATED ORAL at 22:10

## 2021-09-23 RX ADMIN — METHYLPREDNISOLONE SODIUM SUCCINATE 40 MG: 40 INJECTION, POWDER, FOR SOLUTION INTRAMUSCULAR; INTRAVENOUS at 08:18

## 2021-09-23 RX ADMIN — LEVALBUTEROL HYDROCHLORIDE 1.25 MG: 1.25 SOLUTION, CONCENTRATE RESPIRATORY (INHALATION) at 08:15

## 2021-09-23 RX ADMIN — MULTIPLE VITAMINS W/ MINERALS TAB 1 TABLET: TAB ORAL at 08:13

## 2021-09-23 RX ADMIN — ISODIUM CHLORIDE 3 ML: 0.03 SOLUTION RESPIRATORY (INHALATION) at 14:34

## 2021-09-23 RX ADMIN — ACETAMINOPHEN 650 MG: 325 TABLET ORAL at 08:12

## 2021-09-23 RX ADMIN — FUROSEMIDE 40 MG: 10 INJECTION, SOLUTION INTRAMUSCULAR; INTRAVENOUS at 01:57

## 2021-09-23 RX ADMIN — HEPARIN SODIUM 7500 UNITS: 5000 INJECTION INTRAVENOUS; SUBCUTANEOUS at 22:10

## 2021-09-23 RX ADMIN — ISODIUM CHLORIDE 3 ML: 0.03 SOLUTION RESPIRATORY (INHALATION) at 19:19

## 2021-09-23 RX ADMIN — METHYLPREDNISOLONE SODIUM SUCCINATE 40 MG: 40 INJECTION, POWDER, FOR SOLUTION INTRAMUSCULAR; INTRAVENOUS at 16:14

## 2021-09-23 RX ADMIN — HEPARIN SODIUM 7500 UNITS: 5000 INJECTION INTRAVENOUS; SUBCUTANEOUS at 14:33

## 2021-09-23 RX ADMIN — ATORVASTATIN CALCIUM 10 MG: 10 TABLET, FILM COATED ORAL at 16:13

## 2021-09-23 NOTE — ASSESSMENT & PLAN NOTE
· Presented with increased shortness of breath and wheezing with productive cough  · Notes improvement following nebulizers and Solu-Medrol provided in ED but does not feel at baseline  · CXR to wet read without acute cardiopulmonary disease visualized, will follow-up final read  · Will continue with nebulizers, Solu-Medrol 40 mg q 8 hours, Pulmicort  · Respiratory protocol, incentive spirometry, pulmonary toileting

## 2021-09-23 NOTE — ASSESSMENT & PLAN NOTE
· Patient did complain of some increased lower extremity edema and orthopnea  · Echo 07/2021 EF 50% with akinesis of basal inferior and basal inferolateral walls, G1DD  · Will provide 40 mg IV Lasix and assess response  · Monitor daily weights, I/O, volume status  · Feel shortness of breath is likely related to asthma as patient is wheezing, and is improved after nebulizer/inhalers    · Will give 75 mL/hour of fluid for 10 hours  · Monitor volume status closely  · Elevation for lower extremity edema

## 2021-09-23 NOTE — UTILIZATION REVIEW
Initial Clinical Review    Admission: Date/Time/Statement:   Admission Orders (From admission, onward)     Ordered        09/22/21 2352  Inpatient Admission  Once                   Orders Placed This Encounter   Procedures    Inpatient Admission     Standing Status:   Standing     Number of Occurrences:   1     Order Specific Question:   Level of Care     Answer:   Med Surg [16]     Order Specific Question:   Estimated length of stay     Answer:   More than 2 Midnights     Order Specific Question:   Certification     Answer:   I certify that inpatient services are medically necessary for this patient for a duration of greater than two midnights  See H&P and MD Progress Notes for additional information about the patient's course of treatment  ED Arrival Information     Expected Arrival Acuity    - 9/22/2021 19:59 Urgent         Means of arrival Escorted by Service Admission type    Walk-In Self Hospitalist Urgent         Arrival complaint    Shortness of Breath        Chief Complaint   Patient presents with    Shortness of Breath     started 9/20, getting worse SOB w moist cough, yellow clear phlegm       Initial Presentation:  48 yo m with a pmh of asthma, VICKY on CPAP, Obesity, Cardiomyopathy, ARF ( baseline Cr 1 1-1 3)  and  DM  He presents to the Ed with SOB, since 9/20/21, he reports this feels similar to his last hospitalization for asthma exacerbation 7/15/21  He has a productive cough for a yellowish mucus  He used his home inhaler which provided minimal relief  He has swollen legs,  And reports he is SOB when lying flat, he has been sleeping in a recliner due to this  He reports some chest tightness with deep inspirations  He reports he took his  40 mg lasix this am, and has had no recent changes in his medications  He is COVID vaccinated and does not have any known COVID exposures  IN the ED he received DuoNeb, heart neb, mag , and IV Solumedrol with some improvement   He is admitted inpatient for suspected asthma exacerbation  Date: 9/23/2021   Day 2: On exam this am he has I & E wheezing, b/l lower extremity edema +2  He reports he has been losing approximately 20 lb over the last couple months  Today he reports some improvement  With his breathing after nebs and IV steroids  He received 40 mg lasix iv yesterday  and his creatinine jumped form 1 6 to 1 8  Hold lasix continue IV hydration and monitor labs  Elevate LE's due to his edema       ED Triage Vitals   Temperature Pulse Respirations Blood Pressure SpO2   09/22/21 2018 09/22/21 2018 09/22/21 2018 09/22/21 2018 09/22/21 2018   98 3 °F (36 8 °C) 99 22 127/77 94 %      Temp Source Heart Rate Source Patient Position - Orthostatic VS BP Location FiO2 (%)   09/22/21 2018 09/22/21 2235 09/22/21 2018 09/22/21 2018 --   Oral Monitor Sitting Right arm       Pain Score       09/23/21 0808       6          Wt Readings from Last 1 Encounters:   09/22/21 (!) 155 kg (341 lb)     Additional Vital Signs:   Date/Time  Temp  Pulse  Resp  BP  SpO2  O2 Device  Patient Position - Orthostatic VS   09/23/21 0808  --  92  20  156/71  94 %  None (Room air)  Sitting   09/23/21 0557  --  96  18  152/67  95 %  None (Room air)  --   09/23/21 0427  --  98  18  157/70  94 %  None (Room air)  --   09/23/21 0322  --  96  18  --  96 %  --  --   09/23/21 0158  --  100  18  159/70  93 %  None (Room air)  --   09/22/21 2352  --  --  --  --  95 %  --  --   09/22/21 2236  --  --  --  --  --  None (Room air)  --   09/22/21 2235  --  101  20  127/77  93 %  None (Room air)  --             Pertinent Labs/Diagnostic Test Results:   Results from last 7 days   Lab Units 09/22/21 2357   SARS-COV-2  Negative     Results from last 7 days   Lab Units 09/23/21  0423 09/22/21  2257   WBC Thousand/uL 8 02 8 54   HEMOGLOBIN g/dL 11 9* 11 9*   HEMATOCRIT % 36 7 37 0   PLATELETS Thousands/uL 228 256   NEUTROS ABS Thousands/µL  --  5 27         Results from last 7 days   Lab Units 09/23/21  4832 09/22/21  2257   SODIUM mmol/L 134* 139   POTASSIUM mmol/L 5 3 4 9   CHLORIDE mmol/L 104 107   CO2 mmol/L 21 26   ANION GAP mmol/L 9 6   BUN mg/dL 33* 32*   CREATININE mg/dL 1 85* 1 65*   EGFR ml/min/1 73sq m 40 46   CALCIUM mg/dL 8 3 8 4     Results from last 7 days   Lab Units 09/22/21  2257   AST U/L 10   ALT U/L 12   ALK PHOS U/L 94   TOTAL PROTEIN g/dL 6 6   ALBUMIN g/dL 2 8*   TOTAL BILIRUBIN mg/dL 0 34     Results from last 7 days   Lab Units 09/23/21  0612   POC GLUCOSE mg/dl 262*     Results from last 7 days   Lab Units 09/23/21  0423 09/22/21  2257   GLUCOSE RANDOM mg/dL 247* 115       Results from last 7 days   Lab Units 09/22/21  2257   TROPONIN I ng/mL <0 02     Results from last 7 days   Lab Units 09/22/21  2257   NT-PRO BNP pg/mL 235*     CXR 9/23 - no acute cardiopulmonary disease    EKG - 9/22  - NSR rate 99 - large amount of artifact in V4/V5    ED Treatment:   Medication Administration from 09/22/2021 1959 to 09/23/2021 3225       Date/Time Order Dose Route Action Comments     09/22/2021 2245 ipratropium (ATROVENT) 0 02 % inhalation solution 0 5 mg 0 5 mg Nebulization Given      09/22/2021 2245 albuterol inhalation solution 5 mg 5 mg Nebulization Given      09/22/2021 2307 magnesium sulfate 2 g/50 mL IVPB (premix) 2 g 2 g Intravenous New Bag      09/22/2021 2358 methylPREDNISolone sodium succinate (Solu-MEDROL) injection 80 mg 80 mg Intravenous Given      09/22/2021 2352 albuterol inhalation solution 10 mg 10 mg Nebulization Given      09/22/2021 2351 ipratropium (ATROVENT) 0 02 % inhalation solution 1 mg 1 mg Nebulization Given      09/22/2021 2352 sodium chloride 0 9 % inhalation solution 12 mL 12 mL Nebulization Given      09/23/2021 0814 furosemide (LASIX) tablet 40 mg 40 mg Oral Given      09/23/2021 0813 multivitamin-minerals (CENTRUM) tablet 1 tablet 1 tablet Oral Given      09/23/2021 0818 methylPREDNISolone sodium succinate (Solu-MEDROL) injection 40 mg 40 mg Intravenous Given 09/23/2021 0815 levalbuterol (XOPENEX) inhalation solution 1 25 mg 1 25 mg Nebulization Given      09/23/2021 0815 sodium chloride 0 9 % inhalation solution 3 mL 3 mL Nebulization Given      09/23/2021 2356 acetaminophen (TYLENOL) tablet 650 mg 650 mg Oral Given      09/23/2021 0157 furosemide (LASIX) injection 40 mg 40 mg Intravenous Given      09/23/2021 0815 budesonide (PULMICORT) inhalation solution 0 5 mg 0 5 mg Nebulization Given      09/23/2021 0554 heparin (porcine) subcutaneous injection 7,500 Units 7,500 Units Subcutaneous Given         Past Medical History:   Diagnosis Date    Acute gastritis without hemorrhage     last assessed 3/24/17    Asthma     Diabetes mellitus ([facility] )     Gastric bypass status for obesity     Hyperlipidemia     Hypertension     Impaired fasting glucose     last assessed 5/10/17    Obesity     Viral gastroenteritis     last assessed 11/4/16     Present on Admission:   Essential hypertension   Type 2 diabetes mellitus with hyperglycemia ([facility])   Moderate persistent asthma with acute exacerbation   Cardiomyopathy ([facility] )   VICKY (obstructive sleep apnea)   Morbid obesity ([facility])   Acute renal failure (ARF) ([facility])      Admitting Diagnosis: Shortness of breath [R06 02]  Age/Sex: 49 y o  male         Admission Orders:  Scheduled Medications:  atorvastatin, 10 mg, Oral, Daily With Dinner  budesonide, 0 5 mg, Nebulization, Q12H  furosemide, 40 mg, Oral, Daily  heparin (porcine), 7,500 Units, Subcutaneous, Q8H [facility]  levalbuterol, 1 25 mg, Nebulization, TID   And  sodium chloride, 3 mL, Nebulization, TID  methylPREDNISolone sodium succinate, 40 mg, Intravenous, Q8H [facility]  montelukast, 10 mg, Oral, HS  multivitamin-minerals, 1 tablet, Oral, Daily      Continuous IV Infusions:  sodium chloride, 75 mL/hr, Intravenous, Continuous - d/c'd 9/23 @ 19:49      PRN Meds:  acetaminophen, 650 mg, Oral, Q6H PRN - 9/23 x 1   albuterol, 2 puff, Inhalation, Q4H PRN  levalbuterol, 1 25 mg, Nebulization, Q6H PRN   And  sodium chloride, 3 mL, Nebulization, Q6H PRN  ondansetron, 4 mg, Intravenous, Q6H PRN      Nursing Orders -  VS - up & OOB as tolerated - SCD's to le's -  Diet cons carb - fluid restriction 1800 ml - CPAP hs       Network Utilization Review Department  ATTENTION: Please call with any questions or concerns to [phone] and carefully listen to the prompts so that you are directed to the right person  All voicemails are confidential   [facility] all requests for admission clinical reviews, approved or denied determinations and any other requests to dedicated fax number below belonging to the campus where the patient is receiving treatment   List of dedicated fax numbers for the Facilities:  [address] DENIALS (Administrative/Medical Necessity) [phone]   [address] (Maternity/NICU/Pediatrics) [phone]   [address] [place] [address] [address] [place] [address] [place] [address] [place] [address] [phone]

## 2021-09-23 NOTE — ASSESSMENT & PLAN NOTE
· Suspect morbid obesity is contributing to shortness of breath  · Dietary and lifestyle modification advised  · Reports actually losing 20  Pounds over the past couple of months

## 2021-09-23 NOTE — UTILIZATION REVIEW
Inpatient Admission Authorization Request   NOTIFICATION OF INPATIENT ADMISSION/INPATIENT AUTHORIZATION REQUEST   SERVICING FACILITY:   [facility]  Address: [address] [place] [address], [address] [place] [address]  Tax ID: [id number]  NPI: [id number]  Place of Service: Inpatient [address] [facility]  [address]  Place of Service Code: 24     ATTENDING PROVIDER:  Attending Name and NPI#: Erlinda Soto [[id number]]  Address: [address] [place] [address], [address] [place] [address]  Phone: [phone]     UTILIZATION REVIEW CONTACT:  [facility] Utilization   Network Utilization Review Department  Phone: [phone]  Fax: [phone]  Email: [email or web address]     PHYSICIAN ADVISORY SERVICES:  FOR [id number] REVIEW - MEDICAL NECESSITY DENIAL  Phone: [phone]  Fax: [phone]  Email: [email or web address]     TYPE OF REQUEST:  Inpatient Status     ADMISSION INFORMATION:  ADMISSION DATE/TIME: 9/22/21 11:52 PM  PATIENT DIAGNOSIS CODE/DESCRIPTION:  Shortness of breath [R06 02]  DISCHARGE DATE/TIME: No discharge date for patient encounter  DISCHARGE DISPOSITION (IF DISCHARGED): Home/Self Care     IMPORTANT INFORMATION:  Please contact the [facility] directly with any questions or concerns regarding this request  Department voicemails are confidential     Send requests for admission clinical reviews, concurrent reviews, approvals, and administrative denials due to lack of clinical to fax [phone]

## 2021-09-23 NOTE — RESPIRATORY THERAPY NOTE
RT Protocol Note  Comfort Kovacs 49 y o  male MRN: [id number]  Unit/Bed#: TAYLOR Encounter: [id number]    Assessment    Principal Problem: Moderate persistent asthma with acute exacerbation  Active Problems:    Essential hypertension    Type 2 diabetes mellitus with hyperglycemia (HCC)    Morbid obesity (HCC)    VICKY (obstructive sleep apnea)    Cardiomyopathy ([facility] )    Acute renal failure (ARF) ([facility])      Home Pulmonary Medications:  ProAir HFA MDI Q6PRN  Symbicort BID       Past Medical History:   Diagnosis Date    Acute gastritis without hemorrhage     last assessed 3/24/17    Asthma     Diabetes mellitus ([facility] )     Gastric bypass status for obesity     Hyperlipidemia     Hypertension     Impaired fasting glucose     last assessed 5/10/17    Obesity     Viral gastroenteritis     last assessed 11/4/16     Social History     Socioeconomic History    Marital status: /Civil Union     Spouse name: None    Number of children: None    Years of education: None    Highest education level: None   Occupational History    None   Tobacco Use    Smoking status: Light Tobacco Smoker     Types: Cigars    Smokeless tobacco: Current User    Tobacco comment: rare   Vaping Use    Vaping Use: Never used   Substance and Sexual Activity    Alcohol use: Yes     Comment: social    Drug use: No    Sexual activity: None   Other Topics Concern    None   Social History Narrative    None     Social Determinants of Health     Financial Resource Strain:     Difficulty of Paying Living Expenses:    Food Insecurity:     Worried About Running Out of Food in the Last Year:     Ran Out of Food in the Last Year:    Transportation Needs:     Lack of Transportation (Medical):      Lack of Transportation (Non-Medical):    Physical Activity:     Days of Exercise per Week:     Minutes of Exercise per Session:    Stress:     Feeling of Stress :    Social Connections:     Frequency of Communication with Friends and Family:  Frequency of Social Gatherings with Friends and Family:     Attends Worship Services:     Active Member of Clubs or Organizations:     Attends Club or Organization Meetings:     Marital Status:    Intimate Partner Violence:     Fear of Current or Ex-Partner:     Emotionally Abused:     Physically Abused:     Sexually Abused:        Subjective         Objective    Physical Exam:   Assessment Type: Assess only  General Appearance: Awake, Alert  Respiratory Pattern: Spontaneous, Normal  Chest Assessment: Chest expansion symmetrical, Trachea midline  Bilateral Breath Sounds: Coarse  Cough: Strong, Non-productive  O2 Device: RA    Vitals:  Blood pressure 159/70, pulse 96, temperature 98 3 °F (36 8 °C), temperature source Oral, resp  rate 18, weight (!) 155 kg (341 lb), SpO2 96 %  Imaging and other studies: I have personally reviewed pertinent reports  O2 Device: RA     Plan    Respiratory Plan: Home Bronchodilator Patient pathway, No distress/Pulmonary history        Resp Comments: Pt assessed for new UDN orders  Pt observed on room air with some bilateral wheezing noted  Pt uses MDI's at home for symptom relief  Provider has ordered UDN TID with PRN as needed  No distress noted at this time, will continue to monitor

## 2021-09-23 NOTE — ASSESSMENT & PLAN NOTE
Lab Results   Component Value Date    HGBA1C 7 1 (H) 07/18/2021       No results for input(s): POCGLU in the last 72 hours      Blood Sugar Average: Last 72 hrs:  ·  reasonably controlled as outpatient  · Hold home oral medications, start SSI with Accu-Cheks while hospitalized  · Carb controlled diet  · Initiate hypoglycemia protocol

## 2021-09-23 NOTE — ASSESSMENT & PLAN NOTE
· Presented with increased shortness of breath and wheezing with productive cough  · Notes improvement following nebulizers and Solu-Medrol provided in ED but does not feel at baseline  · CXR to wet read without acute cardiopulmonary disease visualized, will follow-up final read  · Will continue with nebulizers, Solu-Medrol 40 mg q 8 hours, Pulmicort  · Respiratory protocol, incentive spirometry, pulmonary toileting  · Hopeful discharge tomorrow as his breathing has improved significantly

## 2021-09-23 NOTE — ED PROVIDER NOTES
History  Chief Complaint   Patient presents with    Shortness of Breath     started 9/20, getting worse SOB w moist cough, yellow clear phlegm     Patient is a 61-year-old male with past medical history of asthma and diabetes, as well as a recent hospital admission with a suspected asthma exacerbation 07/15/2021  Today, he is presenting with shortness of breath, and difficulty taking deep breaths in since 09/20/2021  He states that this feels similar to his last hospitalization  He has also noticed a productive cough of a yellowish mucus  He tried using his home inhaler for his symptoms and this provided minimal relief  This morning, he noted that both of his legs were much more swollen than normal   He has also noted that he has had some shortness of breath when lying flat, and because of this has been sleeping in his recliner  Currently, he denying chest pain, but does note some chest tightness when he takes deep breaths in  He notes that he had an echocardiogram during his last visit, and on chart review his ejection fraction was found to be approximately 50%  He states that he takes Lasix for lower extremity swelling, and had 40 mg this morning  He has had no recent changes to his Lasix dosage  He is denying fever, chills, nausea, vomiting, sick contacts  His COVID vaccinated and does not have any known COVID exposures  He has no other acute complaints at this time  Prior to Admission Medications   Prescriptions Last Dose Informant Patient Reported? Taking?    Accu-Chek FastClix Lancets MISC 9/22/2021 at Unknown time  No Yes   Sig: Test blood sugar twice daily   Blood Glucose Monitoring Suppl (Accu-Chek Guide) w/Device KIT 9/22/2021 at Unknown time  No Yes   Sig: Use 2 (two) times a day Test blood sugar twice daily   Januvia 100 MG tablet 9/22/2021 at Unknown time  No Yes   Sig: TAKE 1 TABLET BY MOUTH EVERY DAY   MULTIPLE VITAMINS-CALCIUM PO 9/22/2021 at Unknown time Self Yes Yes   Sig: Take 1 tablet by mouth daily   albuterol (ProAir HFA) 90 mcg/act inhaler 9/22/2021 at Unknown time  No Yes   Sig: Inhale 2 puffs every 4 (four) hours as needed for wheezing   atorvastatin (LIPITOR) 10 mg tablet 9/22/2021 at Unknown time  No Yes   Sig: TAKE 1 TABLET BY MOUTH EVERY DAY   budesonide-formoterol (Symbicort) 160-4 5 mcg/act inhaler 9/22/2021 at Unknown time  No Yes   Sig: Inhale 2 puffs 2 (two) times a day Rinse mouth after use  furosemide (LASIX) 40 mg tablet   No No   Sig: Take 1 tablet (40 mg total) by mouth daily   glucose blood (Accu-Chek Guide) test strip 9/22/2021 at Unknown time  No Yes   Sig: Test blood sugar twice daily   lisinopril (ZESTRIL) 30 mg tablet 9/22/2021 at Unknown time Self No Yes   Sig: TAKE 1 TABLET BY MOUTH DAILY   metFORMIN (GLUCOPHAGE) 500 mg tablet 9/22/2021 at Unknown time  No Yes   Sig: Take 1 tablet (500 mg total) by mouth 2 (two) times a day After meals   montelukast (SINGULAIR) 10 mg tablet   No No   Sig: Take 1 tablet (10 mg total) by mouth daily at bedtime      Facility-Administered Medications: None       Past Medical History:   Diagnosis Date    Acute gastritis without hemorrhage     last assessed 3/24/17    Asthma     Diabetes mellitus ([facility] [address] )     Gastric bypass status for obesity     Hyperlipidemia     Hypertension     Impaired fasting glucose     last assessed 5/10/17    Obesity     Viral gastroenteritis     last assessed 11/4/16       Past Surgical History:   Procedure Laterality Date    CARPAL TUNNEL RELEASE      bilateral    GASTRIC BYPASS  2004    with han en y    HERNIA REPAIR      ventral inscisional    TONSILLECTOMY         Family History   Problem Relation Age of Onset    Stroke Mother     Hypertension Father      I have reviewed and agree with the history as documented      E-Cigarette/Vaping    E-Cigarette Use Never User      E-Cigarette/Vaping Substances     Social History     Tobacco Use    Smoking status: Light Tobacco Smoker     Types: Cigars  Smokeless tobacco: Current User    Tobacco comment: rare   Vaping Use    Vaping Use: Never used   Substance Use Topics    Alcohol use: Yes     Comment: social    Drug use: No        Review of Systems   Constitutional: Negative for chills and fever  HENT: Negative for ear pain, rhinorrhea and sore throat  Eyes: Negative for pain  Respiratory: Positive for cough, chest tightness, shortness of breath and wheezing  Cardiovascular: Positive for leg swelling  Negative for chest pain  Gastrointestinal: Negative for abdominal distention, abdominal pain, constipation, diarrhea, nausea and vomiting  Genitourinary: Negative for dysuria  Musculoskeletal: Negative for myalgias  Skin: Negative for rash  Neurological: Negative for dizziness, syncope, light-headedness and headaches  Psychiatric/Behavioral: Negative for agitation  All other systems reviewed and are negative  Physical Exam  ED Triage Vitals   Temperature Pulse Respirations Blood Pressure SpO2   09/22/21 2018 09/22/21 2018 09/22/21 2018 09/22/21 2018 09/22/21 2018   98 3 °F (36 8 °C) 99 22 127/77 94 %      Temp Source Heart Rate Source Patient Position - Orthostatic VS BP Location FiO2 (%)   09/22/21 2018 09/22/21 2235 09/22/21 2018 09/22/21 2018 --   Oral Monitor Sitting Right arm       Pain Score       09/23/21 0808       6             Orthostatic Vital Signs  Vitals:    09/23/21 0950 09/23/21 1100 09/23/21 1300 09/23/21 1408   BP: 156/71 161/74 169/78 161/76   Pulse: 92 92 90 91   Patient Position - Orthostatic VS: Lying Sitting Sitting Lying       Physical Exam  Vitals and nursing note reviewed  Constitutional:       Appearance: Normal appearance  He is obese  HENT:      Head: Normocephalic and atraumatic  Right Ear: External ear normal       Left Ear: External ear normal       Nose: Nose normal    Eyes:      General: No scleral icterus  Right eye: No discharge  Left eye: No discharge        Extraocular Movements: Extraocular movements intact  Conjunctiva/sclera: Conjunctivae normal    Cardiovascular:      Rate and Rhythm: Regular rhythm  Tachycardia present  Pulses: Normal pulses  Heart sounds: Normal heart sounds  No murmur heard  No friction rub  No gallop  Pulmonary:      Effort: Pulmonary effort is normal  No accessory muscle usage or respiratory distress  Breath sounds: Examination of the right-upper field reveals wheezing  Examination of the left-upper field reveals wheezing  Examination of the right-middle field reveals wheezing  Examination of the left-middle field reveals wheezing  Examination of the right-lower field reveals wheezing  Examination of the left-lower field reveals wheezing  Wheezing present  Comments: Patient in tripod position  Abdominal:      General: Abdomen is flat  Bowel sounds are normal  There is no distension  Palpations: Abdomen is soft  There is no mass  Tenderness: There is no abdominal tenderness  Musculoskeletal:         General: Normal range of motion  Cervical back: Normal range of motion  Skin:     General: Skin is warm and dry  Neurological:      General: No focal deficit present  Mental Status: He is alert     Psychiatric:         Mood and Affect: Mood normal          ED Medications  Medications   atorvastatin (LIPITOR) tablet 10 mg (has no administration in time range)   furosemide (LASIX) tablet 40 mg (40 mg Oral Given 9/23/21 0814)   montelukast (SINGULAIR) tablet 10 mg (0 mg Oral Hold 9/23/21 0205)   multivitamin-minerals (CENTRUM) tablet 1 tablet (1 tablet Oral Given 9/23/21 0813)   albuterol (PROVENTIL HFA,VENTOLIN HFA) inhaler 2 puff (has no administration in time range)   methylPREDNISolone sodium succinate (Solu-MEDROL) injection 40 mg (40 mg Intravenous Given 9/23/21 0818)   levalbuterol (XOPENEX) inhalation solution 1 25 mg (1 25 mg Nebulization Given 9/23/21 1433)     And   sodium chloride 0 9 % inhalation solution 3 mL (3 mL Nebulization Given 9/23/21 1434)   levalbuterol (XOPENEX) inhalation solution 1 25 mg (has no administration in time range)     And   sodium chloride 0 9 % inhalation solution 3 mL (has no administration in time range)   ondansetron (ZOFRAN) injection 4 mg (has no administration in time range)   acetaminophen (TYLENOL) tablet 650 mg (650 mg Oral Given 9/23/21 0812)   budesonide (PULMICORT) inhalation solution 0 5 mg (0 5 mg Nebulization Given 9/23/21 0815)   heparin (porcine) subcutaneous injection 7,500 Units (7,500 Units Subcutaneous Given 9/23/21 1433)   sodium chloride 0 9 % infusion (75 mL/hr Intravenous New Bag 9/23/21 0950)   ipratropium (ATROVENT) 0 02 % inhalation solution 0 5 mg (0 5 mg Nebulization Given 9/22/21 2245)   albuterol inhalation solution 5 mg (5 mg Nebulization Given 9/22/21 2245)   magnesium sulfate 2 g/50 mL IVPB (premix) 2 g (0 g Intravenous Stopped 9/22/21 2337)   methylPREDNISolone sodium succinate (Solu-MEDROL) injection 80 mg (80 mg Intravenous Given 9/22/21 2358)   albuterol inhalation solution 10 mg (10 mg Nebulization Given 9/22/21 2352)   ipratropium (ATROVENT) 0 02 % inhalation solution 1 mg (1 mg Nebulization Given 9/22/21 2351)   sodium chloride 0 9 % inhalation solution 12 mL (12 mL Nebulization Given 9/22/21 2352)   furosemide (LASIX) injection 40 mg (40 mg Intravenous Given 9/23/21 0157)       Diagnostic Studies  Results Reviewed     Procedure Component Value Units Date/Time    Fingerstick Glucose (POCT) [[id number]]  (Abnormal) Collected: 09/23/21 0612    Lab Status: Final result Updated: 09/23/21 0616     POC Glucose 262 mg/dl     Basic metabolic panel [[id number]]  (Abnormal) Collected: 09/23/21 0423    Lab Status: Final result Specimen: Blood from Arm, Left Updated: 09/23/21 0455     Sodium 134 mmol/L      Potassium 5 3 mmol/L      Chloride 104 mmol/L      CO2 21 mmol/L      ANION GAP 9 mmol/L      BUN 33 mg/dL      Creatinine 1 85 mg/dL      Glucose 247 mg/dL      Calcium 8 3 mg/dL      eGFR 40 ml/min/1 73sq m     Narrative:      [place] guidelines for Chronic Kidney Disease (CKD):     Stage 1 with normal or high GFR (GFR > 90 mL/min/1 73 square meters)    Stage 2 Mild CKD (GFR = 60-89 mL/min/1 73 square meters)    Stage 3A Moderate CKD (GFR = 45-59 mL/min/1 73 square meters)    Stage 3B Moderate CKD (GFR = 30-44 mL/min/1 73 square meters)    Stage 4 Severe CKD (GFR = 15-29 mL/min/1 73 square meters)    Stage 5 End Stage CKD (GFR <15 mL/min/1 73 square meters)  Note: GFR calculation is accurate only with a steady state creatinine    CBC (With Platelets) [[id number]]  (Abnormal) Collected: 09/23/21 0423    Lab Status: Final result Specimen: Blood from Arm, Left Updated: 09/23/21 0434     WBC 8 02 Thousand/uL      RBC 3 79 Million/uL      Hemoglobin 11 9 g/dL      Hematocrit 36 7 %      MCV 97 fL      MCH 31 4 pg      MCHC 32 4 g/dL      RDW 13 5 %      Platelets 054 Thousands/uL      MPV 9 9 fL     Novel Coronavirus (Covid-19),PCR [facility] - 2 Hour Stat [[id number]]  (Normal) Collected: 09/22/21 2357    Lab Status: Final result Specimen: Nares from Nose Updated: 09/23/21 0146     SARS-CoV-2 Negative    Narrative:           NT-BNP PRO [[id number]]  (Abnormal) Collected: 09/22/21 2257    Lab Status: Final result Specimen: Blood from Arm, Left Updated: 09/22/21 2327     NT-proBNP 235 pg/mL     Troponin I [[id number]]  (Normal) Collected: 09/22/21 2257    Lab Status: Final result Specimen: Blood from Arm, Left Updated: 09/22/21 2327     Troponin I <0 02 ng/mL     Comprehensive metabolic panel [[id number]]  (Abnormal) Collected: 09/22/21 2257    Lab Status: Final result Specimen: Blood from Arm, Left Updated: 09/22/21 2326     Sodium 139 mmol/L      Potassium 4 9 mmol/L      Chloride 107 mmol/L      CO2 26 mmol/L      ANION GAP 6 mmol/L      BUN 32 mg/dL      Creatinine 1 65 mg/dL      Glucose 115 mg/dL      Calcium 8 4 mg/dL      Corrected Calcium 9 4 mg/dL      AST 10 U/L      ALT 12 U/L      Alkaline Phosphatase 94 U/L      Total Protein 6 6 g/dL      Albumin 2 8 g/dL      Total Bilirubin 0 34 mg/dL      eGFR 46 ml/min/1 73sq m     Narrative:      [place] guidelines for Chronic Kidney Disease (CKD):     Stage 1 with normal or high GFR (GFR > 90 mL/min/1 73 square meters)    Stage 2 Mild CKD (GFR = 60-89 mL/min/1 73 square meters)    Stage 3A Moderate CKD (GFR = 45-59 mL/min/1 73 square meters)    Stage 3B Moderate CKD (GFR = 30-44 mL/min/1 73 square meters)    Stage 4 Severe CKD (GFR = 15-29 mL/min/1 73 square meters)    Stage 5 End Stage CKD (GFR <15 mL/min/1 73 square meters)  Note: GFR calculation is accurate only with a steady state creatinine    CBC and differential [[id number]]  (Abnormal) Collected: 09/22/21 2257    Lab Status: Final result Specimen: Blood from Arm, Left Updated: 09/22/21 2305     WBC 8 54 Thousand/uL      RBC 3 81 Million/uL      Hemoglobin 11 9 g/dL      Hematocrit 37 0 %      MCV 97 fL      MCH 31 2 pg      MCHC 32 2 g/dL      RDW 13 5 %      MPV 9 7 fL      Platelets 744 Thousands/uL      nRBC 0 /100 WBCs      Neutrophils Relative 61 %      Immat GRANS % 1 %      Lymphocytes Relative 17 %      Monocytes Relative 7 %      Eosinophils Relative 13 %      Basophils Relative 1 %      Neutrophils Absolute 5 27 Thousands/µL      Immature Grans Absolute 0 06 Thousand/uL      Lymphocytes Absolute 1 42 Thousands/µL      Monocytes Absolute 0 57 Thousand/µL      Eosinophils Absolute 1 12 Thousand/µL      Basophils Absolute 0 10 Thousands/µL                  XR chest 2 views   Final Result by Cha Adames MD (09/23 4811)      No acute cardiopulmonary disease                    Workstation performed: [id number]               Procedures  ECG 12 Lead Documentation Only    Date/Time: 9/22/2021 11:33 PM  Performed by: Kari Chu DO  Authorized by: Kari Chu DO     Indications / Diagnosis:  Chest tightness  ECG reviewed by me, the ED Provider: yes    Patient location:  ED  Previous ECG:     Previous ECG:  Compared to current  Interpretation:     Interpretation: non-specific    Quality:     Tracing quality:  Limited by artifact  Comments:      ECG performed after sign out  Upon review, it is limited by artifact          ED Course  ED Course as of Sep 23 1506   Wed Sep 22, 2021   2235 Patient examined by me  Vitals reviewed and within normal limits  Labs and imaging ordered  2327 BUN(!): 32   2327 Creatinine(!): 1 65   2327 Troponin I: <0 02   2327 NT-proBNP(!): 235             HEART Risk Score      Most Recent Value   Heart Score Risk Calculator   History  0 Filed at: 09/22/2021 2333   ECG  1 Filed at: 09/22/2021 2333   Age  1 Filed at: 09/22/2021 2333   Risk Factors  1 Filed at: 09/22/2021 2333   Troponin  0 Filed at: 09/22/2021 2333   HEART Score  3 Filed at: 09/22/2021 2333                      SBIRT 20yo+      Most Recent Value   SBIRT (25 yo +)   In order to provide better care to our patients, we are screening all of our patients for alcohol and drug use  Would it be okay to ask you these screening questions? No Filed at: 09/22/2021 2233                MDM  Number of Diagnoses or Management Options  Asthma exacerbation: new and requires workup  Diagnosis management comments: Patient is a 53-year-old with past medical history of asthma diabetes, currently presenting with shortness of breath  Based on history and evaluation, differential diagnosis includes but is not limited to:  CHF exacerbation, asthma exacerbation, viral illness  Plan:  CBC, CMP, proBNP, troponin, ECG, chest x-ray, ipratropium, albuterol, magnesium    Patient labs remarkable for an elevated creatinine  He also has a negative troponin  proBNP is elevated, however not high enough to raise concern for CHF exacerbation, making an acute asthma exacerbation more likely   Patient did not notice much improvement with management thus far  Given increased clinical suspicion for asthma exacerbation vs CHF exacerbation, we ordered salumedrol and a serrano neb  Patient discussed with admitting team and accepted for admission for suspected asthma exacerbation  This was discussed with the patient and he seems to understand and agrees  All questions answered  Patient admitted  Patient signed out to next shift  Amount and/or Complexity of Data Reviewed  Clinical lab tests: ordered and reviewed  Tests in the radiology section of CPT®: ordered and reviewed  Review and summarize past medical records: yes  Discuss the patient with other providers: yes  Independent visualization of images, tracings, or specimens: yes    Risk of Complications, Morbidity, and/or Mortality  Presenting problems: moderate  Diagnostic procedures: low  Management options: low    Patient Progress  Patient progress: stable      Disposition  Final diagnoses:   Asthma exacerbation     Time reflects when diagnosis was documented in both MDM as applicable and the Disposition within this note     Time User Action Codes Description Comment    9/22/2021 11:51 PM [address] [[id number]] Asthma exacerbation       ED Disposition     ED Disposition Condition Date/Time Comment    Admit Stable Wed Sep 22, 2021 11:51 PM Case was discussed with ADEN and the patient's admission status was agreed to be Admission Status: inpatient status to the service of Dr [facility]   Follow-up Information    None         Patient's Medications   Discharge Prescriptions    No medications on file     No discharge procedures on file  PDMP Review       Value Time User    PDMP Reviewed  Yes 9/23/2021 12:07 AM Fabiola Leon DO           ED Provider  Attending physically available and evaluated Wes Garner I managed the patient along with the ED Attending      Electronically Signed by         Princess Kat DO  09/23/21 9323

## 2021-09-23 NOTE — H&P
[facility]  H&P- Karly Bright 1965, 49 y o  male MRN: [id number]  Unit/Bed#: CRB Encounter: [id number]  Primary Care Provider: Garrison Sheriff MD   Date and time admitted to hospital: 9/22/2021 10:07 PM    * Moderate persistent asthma with acute exacerbation  Assessment & Plan  · Presented with increased shortness of breath and wheezing with productive cough  · Notes improvement following nebulizers and Solu-Medrol provided in ED but does not feel at baseline  · CXR to wet read without acute cardiopulmonary disease visualized, will follow-up final read  · Will continue with nebulizers, Solu-Medrol 40 mg q 8 hours, Pulmicort  · Respiratory protocol, incentive spirometry, pulmonary toileting    Acute renal failure (ARF) ([facility] [address] )  Assessment & Plan  · POA, baseline appears 1 1-1 3  · Some possible component of volume overload, will assess response to IV Lasix  · Repeat BMP in a m   · Hold nephrotoxins and avoid hypotension    Card[facility]  Assessment & Plan  · Patient did complain of some increased lower extremity edema and orthopnea  · Echo 07/2021 EF 50% with akinesis of basal inferior and basal inferolateral walls, G1DD  · Will provide 40 mg IV Lasix and assess response  · Monitor daily weights, I/O, volume status    VICKY (obstructive sleep apnea)  Assessment & Plan  · Continue nightly CPAP    Morbid obesity ([facility] [address] )  Assessment & Plan  · Suspect morbid obesity is contributing to shortness of breath  · Dietary and lifestyle modification advised    Type 2 diabetes mellitus with hyperglycemia (HCC)  Assessment & Plan  Lab Results   Component Value Date    HGBA1C 7 1 (H) 07/18/2021       No results for input(s): POCGLU in the last 72 hours      Blood Sugar Average: Last 72 hrs:  ·  reasonably controlled as outpatient  · Hold home oral medications, start SSI with Accu-Cheks while hospitalized  · Carb controlled diet  · Initiate hypoglycemia protocol    Essential hypertension  Assessment & Plan  · Continue home antihypertensives with hold parameters  · Monitor blood pressure per protocol        VTE Prophylaxis: Heparin  / sequential compression device   Code Status: Level 1 - Full Code  POLST: POLST form is not discussed and not completed at this time  Discussion with family:  Offered however patient declines at this time    Anticipated Length of Stay:  Patient will be admitted on an Inpatient basis with an anticipated length of stay of  greater than 2 midnights  Justification for Hospital Stay: Please see detailed plans noted above  Chief Complaint:     Shortness of breath  History of Present Illness:  Jan Alvarez is a 49 y o  male who presents with increased shortness of breath and cough  Has past medical history significant for moderate persistent asthma, cardiomyopathy with EF 50%, VICKY on CPAP, hypertension, type 2 diabetes  Reports 3 day history of increasing shortness of breath associated with wheezing and cough productive of yellowish sputum, additionally endorsing increased lower extremity edema and orthopnea during this span  States he trialed home inhaler with spacer without relief  He denies any fevers/chills, known sick contacts, chest pain/pressure, weight gain, or dizziness/lightheadedness during this span  He noted increased lower extremity edema today, prompting presentation  During ED evaluation he received DuoNeb, heart neb, magnesium, and IV Solu-Medrol with some improvement in shortness breath but states he does not feel at baseline  Given this he is admitted for further management of suspected asthma exacerbation      Review of Systems:    Constitutional:  Denies fever or chills   Eyes:  Denies change in visual acuity   HENT:  Denies nasal congestion or sore throat   Respiratory:  Endorses cough and shortness of breath  Cardiovascular:  Denies chest pain but endorses edema  GI:  Denies abdominal pain, nausea, vomiting, bloody stools or diarrhea   :  Denies dysuria   Musculoskeletal:  Denies back pain or joint pain   Integument:  Denies rash   Neurologic:  Denies headache, focal weakness or sensory changes   Endocrine:  Denies polyuria or polydipsia   Lymphatic:  Denies swollen glands   Psychiatric:  Denies depression or anxiety     Past Medical and Surgical History:   Past Medical History:   Diagnosis Date    Acute gastritis without hemorrhage     last assessed 3/24/17    Asthma     Diabetes mellitus ([facility] [address] )     Gastric bypass status for obesity     Hyperlipidemia     Hypertension     Impaired fasting glucose     last assessed 5/10/17    Obesity     Viral gastroenteritis     last assessed 11/4/16     Past Surgical History:   Procedure Laterality Date    CARPAL TUNNEL RELEASE      bilateral    GASTRIC BYPASS  2004    with han en y   [address]      ventral inscisional    TONSILLECTOMY         Meds/Allergies:    Current Facility-Administered Medications:     acetaminophen (TYLENOL) tablet 650 mg, 650 mg, Oral, Q6H PRN, Harvis Light, DO    albuterol (PROVENTIL HFA,VENTOLIN HFA) inhaler 2 puff, 2 puff, Inhalation, Q4H PRN, Harvis Light, DO    atorvastatin (LIPITOR) tablet 10 mg, 10 mg, Oral, Daily With Dinner, Harvis Light, DO    budesonide (PULMICORT) inhalation solution 0 5 mg, 0 5 mg, Nebulization, Q12H, Harvis Light, DO    budesonide-formoterol (SYMBICORT) 160-4 5 mcg/act inhaler 2 puff, 2 puff, Inhalation, BID, Harvis Light, DO    furosemide (LASIX) tablet 40 mg, 40 mg, Oral, Daily, Harvis Light, DO    heparin (porcine) subcutaneous injection 5,000 Units, 5,000 Units, Subcutaneous, Q8H [facility], Harvis Light, DO    levalbuterol (XOPENEX) inhalation solution 1 25 mg, 1 25 mg, Nebulization, TID **AND** sodium chloride 0 9 % inhalation solution 3 mL, 3 mL, Nebulization, TID, Harvis Light, DO    levalbuterol (XOPENEX) inhalation solution 1 25 mg, 1 25 mg, Nebulization, Q6H PRN **AND** sodium chloride 0 9 % inhalation solution 3 mL, 3 mL, Nebulization, Q6H PRN, Jovita Bustamante DO    methylPREDNISolone sodium succinate (Solu-MEDROL) injection 40 mg, 40 mg, Intravenous, Q8H ANA, Jovita Bustamante DO    montelukast (SINGULAIR) tablet 10 mg, 10 mg, Oral, HS, Jovita Bustamante DO    multivitamin-minerals (CENTRUM) tablet 1 tablet, 1 tablet, Oral, Daily, Jovita Bustamante DO    ondansetron [facility] injection 4 mg, 4 mg, Intravenous, Q6H PRN, Jovita Bustamante DO    Current Outpatient Medications:     Accu-Chek FastClix Lancets MISC, Test blood sugar twice daily, Disp: 200 each, Rfl: 3    albuterol (ProAir HFA) 90 mcg/act inhaler, Inhale 2 puffs every 4 (four) hours as needed for wheezing, Disp: 18 g, Rfl: 3    atorvastatin (LIPITOR) 10 mg tablet, TAKE 1 TABLET BY MOUTH EVERY DAY, Disp: 30 tablet, Rfl: 5    Blood Glucose Monitoring Suppl (Accu-Chek Guide) w/Device KIT, Use 2 (two) times a day Test blood sugar twice daily, Disp: 1 kit, Rfl: 0    budesonide-formoterol (Symbicort) 160-4 5 mcg/act inhaler, Inhale 2 puffs 2 (two) times a day Rinse mouth after use , Disp: 10 2 g, Rfl: 0    glucose blood (Accu-Chek Guide) test strip, Test blood sugar twice daily, Disp: 200 strip, Rfl: 3    Januvia 100 MG tablet, TAKE 1 TABLET BY MOUTH EVERY DAY, Disp: 30 tablet, Rfl: 1    lisinopril (ZESTRIL) 30 mg tablet, TAKE 1 TABLET BY MOUTH DAILY, Disp: 30 tablet, Rfl: 5    metFORMIN (GLUCOPHAGE) 500 mg tablet, Take 1 tablet (500 mg total) by mouth 2 (two) times a day After meals, Disp: 60 tablet, Rfl: 6    MULTIPLE VITAMINS-CALCIUM PO, Take 1 tablet by mouth daily, Disp: , Rfl:     furosemide (LASIX) 40 mg tablet, Take 1 tablet (40 mg total) by mouth daily, Disp: 30 tablet, Rfl: 1    montelukast (SINGULAIR) 10 mg tablet, Take 1 tablet (10 mg total) by mouth daily at bedtime, Disp: 30 tablet, Rfl: 1    Allergies:    Allergies   Allergen Reactions    Azithromycin Other (See Comments)     Shaky, uneasy feeling     Bactrim [Sulfamethoxazole-Trimethoprim] Other (See Comments)     shakiness     History:  Marital Status: /Civil Union     Substance Use History:   Social History     Substance and Sexual Activity   Alcohol Use Yes    Comment: social     Social History     Tobacco Use   Smoking Status Light Tobacco Smoker    Types: Cigars   Smokeless Tobacco Current User   Tobacco Comment    rare     Social History     Substance and Sexual Activity   Drug Use No       Family History:  Family History   Problem Relation Age of Onset    Stroke Mother     Hypertension Father        Physical Exam:     Vitals:   Blood Pressure: 127/77 (09/22/21 2235)  Pulse: 101 (09/22/21 2235)  Temperature: 98 3 °F (36 8 °C) (09/22/21 2018)  Temp Source: Oral (09/22/21 2018)  Respirations: 20 (09/22/21 2235)  Weight - Scale: (!) 155 kg (341 lb) (09/22/21 2018)  SpO2: 95 % (09/22/21 2352)    Constitutional:  Well developed, morbidly obese, no acute distress, non-toxic appearance   Eyes:  PERRL, conjunctiva normal   HENT:  Atraumatic, external ears normal, nose normal, oropharynx moist, no pharyngeal exudates  Neck- normal range of motion, no tenderness, supple   Respiratory:  No respiratory distress, no rales, diffuse bilateral expiratory wheezing  Cardiovascular:  Normal rate, normal rhythm, no murmurs, no gallops, no rubs   GI:  Soft, nondistended, normal bowel sounds, nontender, no organomegaly, no mass, no rebound, no guarding   :  No costovertebral angle tenderness   Musculoskeletal:  Trace bilateral lower extremity edema, no tenderness, no deformities  Back- no tenderness  Integument:  Well hydrated, no rash   Lymphatic:  No lymphadenopathy noted   Neurologic:  Alert &awake, communicative, CN 2-12 normal, normal motor function, normal sensory function, no focal deficits noted   Psychiatric:  Speech and behavior appropriate       Lab Results: I have personally reviewed pertinent reports        Results from last 7 days   Lab Units 09/22/21  2602 WBC Thousand/uL 8 54   HEMOGLOBIN g/dL 11 9*   HEMATOCRIT % 37 0   PLATELETS Thousands/uL 256   NEUTROS PCT % 61   LYMPHS PCT % 17   MONOS PCT % 7   EOS PCT % 13*     Results from last 7 days   Lab Units 09/22/21  2257   POTASSIUM mmol/L 4 9   CHLORIDE mmol/L 107   CO2 mmol/L 26   BUN mg/dL 32*   CREATININE mg/dL 1 65*   CALCIUM mg/dL 8 4   ALK PHOS U/L 94   ALT U/L 12   AST U/L 10           EKG:  Large amount of artifact in V4/V5, however within this limit appears normal sinus rhythm HR 99    Imaging: I have personally reviewed pertinent films in PACS    CXR:  Personally reviewed, no acute cardiopulmonary disease visualized  Final radiologist read is pending  ** Please Note: Dragon 360 Dictation voice to text software was used in the creation of this document   **

## 2021-09-23 NOTE — ASSESSMENT & PLAN NOTE
· Suspect morbid obesity is contributing to shortness of breath  · Dietary and lifestyle modification advised

## 2021-09-23 NOTE — ASSESSMENT & PLAN NOTE
· Patient did complain of some increased lower extremity edema and orthopnea  · Echo 07/2021 EF 50% with akinesis of basal inferior and basal inferolateral walls, G1DD  · Will provide 40 mg IV Lasix and assess response  · Monitor daily weights, I/O, volume status

## 2021-09-23 NOTE — ED ATTENDING ATTESTATION
9/22/2021  ILuke DO, saw and evaluated the patient  I have discussed the patient with the resident/non-physician practitioner and agree with the resident's/non-physician practitioner's findings, Plan of Care, and MDM as documented in the resident's/non-physician practitioner's note, except where noted  All available labs and Radiology studies were reviewed  I was present for key portions of any procedure(s) performed by the resident/non-physician practitioner and I was immediately available to provide assistance  At this point I agree with the current assessment done in the Emergency Department  I have conducted an independent evaluation of this patient a history and physical is as follows:    61-year-old have male presents for shortness of breath  Patient has a history of asthma  Also noticed some lower extremity edema over the past day  This is intermittent for him  He has been on Lasix before  He reports a cough dyspnea on exertion  No other associated symptoms or modifying factors  On exam is no rales just inspiratory-expiratory wheezing  Is at 90% on room air  Is obese  No rhonchi  Lower extremity with pitting edema  Does not appear to be volume overloaded  Assessment and plan: Asthma exacerbation versus CHF  Favor asthma  Chest x-ray BNP  COVID test   Breathing treatment  Hold off on Lasix now    Steroids     ED Course         Critical Care Time  Procedures

## 2021-09-23 NOTE — ASSESSMENT & PLAN NOTE
· POA, baseline appears 1 1-1 3  · Patient was given 40 mg IV Lasix yesterday to assess for volume overload status  · Creatinine jumped from 1 6-1 8  · Patient's volume status is likely secondary to dehydration  · Will hold off on further Lasix for now  · Continue IV fluid hydration    · Monitor BMP  · If improving likely discharge tomorrow

## 2021-09-23 NOTE — ASSESSMENT & PLAN NOTE
· POA, baseline appears 1 1-1 3  · Some possible component of volume overload, will assess response to IV Lasix  · Repeat BMP in a m   · Hold nephrotoxins and avoid hypotension

## 2021-09-23 NOTE — PROGRESS NOTES
[facility]  Progress Note - Pravin Prakash 1965, 49 y o  male MRN: [id number]  Unit/Bed#: CRB Encounter: [id number]  Primary Care Provider: Kenton Jeter MD   Date and time admitted to hospital: 9/22/2021 10:07 PM    * Moderate persistent asthma with acute exacerbation  Assessment & Plan  · Presented with increased shortness of breath and wheezing with productive cough  · Notes improvement following nebulizers and Solu-Medrol provided in ED but does not feel at baseline  · CXR to wet read without acute cardiopulmonary disease visualized, will follow-up final read  · Will continue with nebulizers, Solu-Medrol 40 mg q 8 hours, Pulmicort  · Respiratory protocol, incentive spirometry, pulmonary toileting  · Hopeful discharge tomorrow as his breathing has improved significantly     Acute renal failure (ARF) ([facility] [address] )  Assessment & Plan  · POA, baseline appears 1 1-1 3  · Patient was given 40 mg IV Lasix yesterday to assess for volume overload status  · Creatinine jumped from 1 6-1 8  · Patient's volume status is likely secondary to dehydration  · Will hold off on further Lasix for now  · Continue IV fluid hydration  · Monitor BMP  · If improving likely discharge tomorrow    Cardiomyopathy [facility])  Assessment & Plan  · Patient did complain of some increased lower extremity edema and orthopnea  · Echo 07/2021 EF 50% with akinesis of basal inferior and basal inferolateral walls, G1DD  · Will provide 40 mg IV Lasix and assess response  · Monitor daily weights, I/O, volume status  · Feel shortness of breath is likely related to asthma as patient is wheezing, and is improved after nebulizer/inhalers    · Will give 75 mL/hour of fluid for 10 hours  · Monitor volume status closely  · Elevation for lower extremity edema    Morbid obesity ([facility] [address] )  Assessment & Plan  · Suspect morbid obesity is contributing to shortness of breath  · Dietary and lifestyle modification advised  · Reports actually losing 20  Pounds over the past couple of months     Type 2 diabetes mellitus with hyperglycemia [facility])  Assessment & Plan  Lab Results   Component Value Date    HGBA1C 7 1 (H) 2021       Recent Labs     21  0612   POCGLU 262*       Blood Sugar Average: Last 72 hrs:  · (P) 262 reasonably controlled as outpatient  · Hold home oral medications, start SSI with Accu-Cheks while hospitalized  · Carb controlled diet  · Initiate hypoglycemia protocol    Essential hypertension  Assessment & Plan  · Continue home antihypertensives with hold parameters  · Monitor blood pressure per protocol        VTE Pharmacologic Prophylaxis: VTE Score: 5 High Risk (Score >/= 5) - Pharmacological DVT Prophylaxis Ordered: heparin  Sequential Compression Devices Ordered  Patient Centered Rounds: I performed bedside rounds with nursing staff today  Discussions with Specialists or Other Care Team Provider: none     Education and Discussions with Family / Patient: Patient declined call to   Time Spent for Care: 45 minutes  More than 50% of total time spent on counseling and coordination of care as described above  Current Length of Stay: 1 day(s)  Current Patient Status: Inpatient   Certification Statement: The patient will continue to require additional inpatient hospital stay due to Asthma exacerbation  He also has acute kidney injury which will be treated with IV fluids  Discharge Plan: Anticipate discharge tomorrow to home  Code Status: Level 1 - Full Code    Subjective:   He reports improvement after nebulizers, and IV steroids in the emergency department  He does state that his lower extremity edema is still present  Has not had an asthma exacerbation multiple years  Does use inhalers at home    Reports losing approximately 20 lb over the last couple of months    Objective:     Vitals:   Temp (24hrs), Av 3 °F (36 8 °C), Min:98 3 °F (36 8 °C), Max:98 3 °F (36 8 °C)    Temp:  [98 3 °F (36 8 °C)] 98 3 °F (36 8 °C)  HR:  [] 92  Resp:  [18-22] 20  BP: (127-159)/(67-77) 156/71  SpO2:  [93 %-96 %] 94 %  Body mass index is 44 99 kg/m²  Input and Output Summary (last 24 hours): Intake/Output Summary (Last 24 hours) at 9/23/2021 0916  Last data filed at 9/22/2021 2337  Gross per 24 hour   Intake 50 ml   Output --   Net 50 ml       Physical Exam:   Physical Exam  Constitutional:       General: He is not in acute distress  Appearance: He is obese  He is not diaphoretic  HENT:      Head: Normocephalic and atraumatic  Mouth/Throat:      Mouth: Mucous membranes are moist       Pharynx: Oropharynx is clear  No oropharyngeal exudate  Eyes:      General:         Right eye: No discharge  Left eye: No discharge  Conjunctiva/sclera: Conjunctivae normal       Pupils: Pupils are equal, round, and reactive to light  Cardiovascular:      Rate and Rhythm: Normal rate and regular rhythm  Pulses: Normal pulses  Heart sounds: Normal heart sounds  No murmur heard  Pulmonary:      Effort: Pulmonary effort is normal  No respiratory distress  Breath sounds: Wheezing present  No rales  Comments: Inspiratory and expiratory wheezing  Abdominal:      General: Abdomen is flat  There is no distension  Palpations: Abdomen is soft  Tenderness: There is no abdominal tenderness  Musculoskeletal:         General: Swelling (Bilateral lower extremity edema +2) present  Normal range of motion  Cervical back: Neck supple  No muscular tenderness  Right lower leg: Edema present  Left lower leg: Edema present  Skin:     General: Skin is warm and dry  Capillary Refill: Capillary refill takes less than 2 seconds  Coloration: Skin is not jaundiced  Neurological:      General: No focal deficit present  Mental Status: He is alert and oriented to person, place, and time     Psychiatric:         Mood and Affect: Mood normal  Additional Data:     Labs:  Results from last 7 days   Lab Units 09/23/21  0423 09/22/21  2257   WBC Thousand/uL 8 02 8 54   HEMOGLOBIN g/dL 11 9* 11 9*   HEMATOCRIT % 36 7 37 0   PLATELETS Thousands/uL 228 256   NEUTROS PCT %  --  61   LYMPHS PCT %  --  17   MONOS PCT %  --  7   EOS PCT %  --  13*     Results from last 7 days   Lab Units 09/23/21  0423 09/22/21  2257   SODIUM mmol/L 134* 139   POTASSIUM mmol/L 5 3 4 9   CHLORIDE mmol/L 104 107   CO2 mmol/L 21 26   BUN mg/dL 33* 32*   CREATININE mg/dL 1 85* 1 65*   ANION GAP mmol/L 9 6   CALCIUM mg/dL 8 3 8 4   ALBUMIN g/dL  --  2 8*   TOTAL BILIRUBIN mg/dL  --  0 34   ALK PHOS U/L  --  94   ALT U/L  --  12   AST U/L  --  10   GLUCOSE RANDOM mg/dL 247* 115         Results from last 7 days   Lab Units 09/23/21  0612   POC GLUCOSE mg/dl 262*               Lines/Drains:  Invasive Devices     Peripheral Intravenous Line            Peripheral IV 09/22/21 Left Antecubital <1 day                      Imaging: Reviewed radiology reports from this admission including: chest xray    Recent Cultures (last 7 days):         Last 24 Hours Medication List:   Current Facility-Administered Medications   Medication Dose Route Frequency Provider Last Rate    acetaminophen  650 mg Oral Q6H PRN [place] Guitar, DO      albuterol  2 puff Inhalation Q4H PRN [place] G[employer], DO      atorvastatin  10 mg Oral Daily With [employer] DO Sheryl      budesonide  0 5 mg Nebulization Q12H [place] Guitar, DO      furosemide  40 mg Oral Daily [place] Guitar, DO      heparin (porcine)  7,500 Units Subcutaneous [facility] [place] Guitar, DO      levalbuterol  1 25 mg Nebulization TID [place] Guitar, DO      And    sodium chloride  3 mL Nebulization TID [place] Guitar, DO      levalbuterol  1 25 mg Nebulization Q6H PRN [place] G[employer], DO      And    sodium chloride  3 mL Nebulization Q6H PRN [place] Guitar, DO      methylPREDNISolone sodium succinate  40 mg Intravenous [facility] Karolina Bradford Sheryl, DO      montelukast  10 mg Oral HS Adra Cones, DO      multivitamin-minerals  1 tablet Oral Daily Adra Cones, DO      ondansetron  4 mg Intravenous Q6H PRN Adra Cones, DO      sodium chloride  75 mL/hr Intravenous Continuous Elisabeth Otto PA-C          Today, Patient Was Seen By: Elisabeth Otto PA-C    **Please Note: This note may have been constructed using a voice recognition system  **

## 2021-09-23 NOTE — ASSESSMENT & PLAN NOTE
Lab Results   Component Value Date    HGBA1C 7 1 (H) 07/18/2021       Recent Labs     09/23/21  0612   POCGLU 262*       Blood Sugar Average: Last 72 hrs:  · (P) 262 reasonably controlled as outpatient  · Hold home oral medications, start SSI with Accu-Cheks while hospitalized  · Carb controlled diet  · Initiate hypoglycemia protocol

## 2021-09-24 LAB
ANION GAP SERPL CALCULATED.3IONS-SCNC: 7 MMOL/L (ref 4–13)
BUN SERPL-MCNC: 45 MG/DL (ref 5–25)
CALCIUM SERPL-MCNC: 8.4 MG/DL (ref 8.3–10.1)
CHLORIDE SERPL-SCNC: 102 MMOL/L (ref 100–108)
CO2 SERPL-SCNC: 25 MMOL/L (ref 21–32)
CREAT SERPL-MCNC: 1.86 MG/DL (ref 0.6–1.3)
ERYTHROCYTE [DISTWIDTH] IN BLOOD BY AUTOMATED COUNT: 13.2 % (ref 11.6–15.1)
GFR SERPL CREATININE-BSD FRML MDRD: 40 ML/MIN/1.73SQ M
GLUCOSE SERPL-MCNC: 252 MG/DL (ref 65–140)
GLUCOSE SERPL-MCNC: 258 MG/DL (ref 65–140)
GLUCOSE SERPL-MCNC: 261 MG/DL (ref 65–140)
GLUCOSE SERPL-MCNC: 264 MG/DL (ref 65–140)
GLUCOSE SERPL-MCNC: 277 MG/DL (ref 65–140)
HCT VFR BLD AUTO: 36.9 % (ref 36.5–49.3)
HGB BLD-MCNC: 12.1 G/DL (ref 12–17)
MCH RBC QN AUTO: 31.6 PG (ref 26.8–34.3)
MCHC RBC AUTO-ENTMCNC: 32.8 G/DL (ref 31.4–37.4)
MCV RBC AUTO: 96 FL (ref 82–98)
PLATELET # BLD AUTO: 227 THOUSANDS/UL (ref 149–390)
PMV BLD AUTO: 10.6 FL (ref 8.9–12.7)
POTASSIUM SERPL-SCNC: 4.8 MMOL/L (ref 3.5–5.3)
RBC # BLD AUTO: 3.83 MILLION/UL (ref 3.88–5.62)
SODIUM SERPL-SCNC: 134 MMOL/L (ref 136–145)
WBC # BLD AUTO: 8.6 THOUSAND/UL (ref 4.31–10.16)

## 2021-09-24 PROCEDURE — 94760 N-INVAS EAR/PLS OXIMETRY 1: CPT

## 2021-09-24 PROCEDURE — 80048 BASIC METABOLIC PNL TOTAL CA: CPT | Performed by: PHYSICIAN ASSISTANT

## 2021-09-24 PROCEDURE — 94664 DEMO&/EVAL PT USE INHALER: CPT

## 2021-09-24 PROCEDURE — 85027 COMPLETE CBC AUTOMATED: CPT | Performed by: PHYSICIAN ASSISTANT

## 2021-09-24 PROCEDURE — 94640 AIRWAY INHALATION TREATMENT: CPT

## 2021-09-24 PROCEDURE — 82948 REAGENT STRIP/BLOOD GLUCOSE: CPT

## 2021-09-24 PROCEDURE — 99232 SBSQ HOSP IP/OBS MODERATE 35: CPT | Performed by: NURSE PRACTITIONER

## 2021-09-24 RX ORDER — INSULIN GLARGINE 100 [IU]/ML
15 INJECTION, SOLUTION SUBCUTANEOUS
Status: DISCONTINUED | OUTPATIENT
Start: 2021-09-24 | End: 2021-09-25

## 2021-09-24 RX ORDER — METHYLPREDNISOLONE SODIUM SUCCINATE 40 MG/ML
40 INJECTION, POWDER, LYOPHILIZED, FOR SOLUTION INTRAMUSCULAR; INTRAVENOUS EVERY 12 HOURS SCHEDULED
Status: DISCONTINUED | OUTPATIENT
Start: 2021-09-24 | End: 2021-09-26 | Stop reason: HOSPADM

## 2021-09-24 RX ORDER — FUROSEMIDE 10 MG/ML
40 INJECTION INTRAMUSCULAR; INTRAVENOUS ONCE
Status: COMPLETED | OUTPATIENT
Start: 2021-09-24 | End: 2021-09-24

## 2021-09-24 RX ADMIN — FUROSEMIDE 40 MG: 40 TABLET ORAL at 08:46

## 2021-09-24 RX ADMIN — BUDESONIDE 0.5 MG: 0.5 INHALANT ORAL at 07:51

## 2021-09-24 RX ADMIN — ISODIUM CHLORIDE 3 ML: 0.03 SOLUTION RESPIRATORY (INHALATION) at 13:40

## 2021-09-24 RX ADMIN — METHYLPREDNISOLONE SODIUM SUCCINATE 40 MG: 40 INJECTION, POWDER, FOR SOLUTION INTRAMUSCULAR; INTRAVENOUS at 00:34

## 2021-09-24 RX ADMIN — HEPARIN SODIUM 7500 UNITS: 5000 INJECTION INTRAVENOUS; SUBCUTANEOUS at 05:57

## 2021-09-24 RX ADMIN — ISODIUM CHLORIDE 3 ML: 0.03 SOLUTION RESPIRATORY (INHALATION) at 20:59

## 2021-09-24 RX ADMIN — HEPARIN SODIUM 7500 UNITS: 5000 INJECTION INTRAVENOUS; SUBCUTANEOUS at 13:29

## 2021-09-24 RX ADMIN — INSULIN LISPRO 2 UNITS: 100 INJECTION, SOLUTION INTRAVENOUS; SUBCUTANEOUS at 12:02

## 2021-09-24 RX ADMIN — INSULIN LISPRO 2 UNITS: 100 INJECTION, SOLUTION INTRAVENOUS; SUBCUTANEOUS at 17:04

## 2021-09-24 RX ADMIN — LEVALBUTEROL HYDROCHLORIDE 1.25 MG: 1.25 SOLUTION, CONCENTRATE RESPIRATORY (INHALATION) at 20:59

## 2021-09-24 RX ADMIN — INSULIN GLARGINE 15 UNITS: 100 INJECTION, SOLUTION SUBCUTANEOUS at 21:54

## 2021-09-24 RX ADMIN — METHYLPREDNISOLONE SODIUM SUCCINATE 40 MG: 40 INJECTION, POWDER, FOR SOLUTION INTRAMUSCULAR; INTRAVENOUS at 21:55

## 2021-09-24 RX ADMIN — BUDESONIDE 0.5 MG: 0.5 INHALANT ORAL at 20:59

## 2021-09-24 RX ADMIN — METHYLPREDNISOLONE SODIUM SUCCINATE 40 MG: 40 INJECTION, POWDER, FOR SOLUTION INTRAMUSCULAR; INTRAVENOUS at 08:46

## 2021-09-24 RX ADMIN — INSULIN LISPRO 2 UNITS: 100 INJECTION, SOLUTION INTRAVENOUS; SUBCUTANEOUS at 21:59

## 2021-09-24 RX ADMIN — HEPARIN SODIUM 7500 UNITS: 5000 INJECTION INTRAVENOUS; SUBCUTANEOUS at 21:56

## 2021-09-24 RX ADMIN — INSULIN LISPRO 2 UNITS: 100 INJECTION, SOLUTION INTRAVENOUS; SUBCUTANEOUS at 12:00

## 2021-09-24 RX ADMIN — LEVALBUTEROL HYDROCHLORIDE 1.25 MG: 1.25 SOLUTION, CONCENTRATE RESPIRATORY (INHALATION) at 13:40

## 2021-09-24 RX ADMIN — MULTIPLE VITAMINS W/ MINERALS TAB 1 TABLET: TAB ORAL at 08:46

## 2021-09-24 RX ADMIN — LEVALBUTEROL HYDROCHLORIDE 1.25 MG: 1.25 SOLUTION, CONCENTRATE RESPIRATORY (INHALATION) at 07:51

## 2021-09-24 RX ADMIN — ATORVASTATIN CALCIUM 10 MG: 10 TABLET, FILM COATED ORAL at 17:02

## 2021-09-24 RX ADMIN — FUROSEMIDE 40 MG: 10 INJECTION, SOLUTION INTRAMUSCULAR; INTRAVENOUS at 11:48

## 2021-09-24 RX ADMIN — ISODIUM CHLORIDE 3 ML: 0.03 SOLUTION RESPIRATORY (INHALATION) at 07:51

## 2021-09-24 RX ADMIN — INSULIN LISPRO 2 UNITS: 100 INJECTION, SOLUTION INTRAVENOUS; SUBCUTANEOUS at 08:48

## 2021-09-24 RX ADMIN — MONTELUKAST 10 MG: 10 TABLET, FILM COATED ORAL at 21:58

## 2021-09-24 NOTE — ASSESSMENT & PLAN NOTE
· Suspect morbid obesity is contributing to shortness of breath  · Dietary and lifestyle modification advised  · Reports actually losing 20  Pounds over the past couple of months  Has history of gastric bypass surgery

## 2021-09-24 NOTE — CASE MANAGEMENT
LOS: Day 2  Bundle: pt is not a bundle  Readmission risk: pt is not a 30 day readmit    CM reviewed role with pt over the phone  Pt confirmed his wife Jose Maria Lopez as his emergency contact at [phone]  Pt reported that he lives in a 3 story home with 6 M[address] from the front and 16 steps to the 2nd floor and an additional 14 steps to the 3rd  Pt reported that he was [employer]PT[employer] with ADLs, pt drives and works  Pt reported that he has a nebulizer at home which he uses 3x/day  Pt confirmed hx of OPPT with [facility]  PCP is Dr Shreyas De La Torre; pharmacy of choice is [facility] on [address]  No reported hx of MH or D&A  Wife to transport home tomorrow  CM reviewed d/c planning process including the following: identifying help at home, patient preference for d/c planning needs, Discharge Lounge, [facility] Meds to Bed program, availability of treatment team to discuss questions or concerns patient and/or family may have regarding understanding medications and recognizing signs and symptoms once discharged  CM also encouraged patient to follow up with all recommended appointments after discharge  Patient advised of importance for patient and family to participate in managing patients medical well being

## 2021-09-24 NOTE — RESTORATIVE TECHNICIAN NOTE
Restorative Technician Note      Patient Name: Twan Allen     Note Type: Mobility  Patient Position Upon Consult: Seated edge of bed  Activity Performed: Ambulated;Dangled;Stood  Assistive Device: Other (Comment) (None)  Education Provided: Yes (Educated/encouraged pt to ambulate with assistance 3-4 x's/day )  Patient Position at End of Consult: Seated edge of bed; All needs within reach;Bed/Chair alarm activated    Adalberto VERGARA, Restorative Technician, [employer]

## 2021-09-24 NOTE — ASSESSMENT & PLAN NOTE
· Presented with increased shortness of breath and wheezing with productive cough  · CXR negative  · Overall improvement in wheezing and SOB however still with persistent cough and now with worsening LE Edema and orthopnea (see below)  · Wean Solu-Medrol to 40 mg q 12, anticipate transition to prednisone tomorrow    · Continue nebulizers/pulmonary toilet  · On room air

## 2021-09-24 NOTE — ASSESSMENT & PLAN NOTE
· Continues to complain of increased lower extremity edema and orthopnea  Most recent weight 345 to 350 per patient  353 today  · Echo 07/2021 EF 50% with akinesis of basal inferior and basal inferolateral walls, G1DD  · Will give additional intravenous Lasix today    · I&Os, daily weights, low-sodium diet  · Outpatient cardiology f/u with Dr Carla Fair

## 2021-09-24 NOTE — ASSESSMENT & PLAN NOTE
· POA, baseline appears 0 8-1 2  1 86 today  Suspect secondary to volume overload     · Additional IV lasix today  · Continue to hold ACEI  · Avoid nephrotoxins and hypotension  · BMP in AM

## 2021-09-24 NOTE — PROGRESS NOTES
[address] [facility]  Progress Note - Shara Monterroso 1965, 49 y o  male MRN: [id number]  Unit/Bed#: [facility] 710-01 Encounter: [id number]  Primary Care Provider: Laila Zhao MD   Date and time admitted to hospital: 9/22/2021 10:07 PM    * Moderate persistent asthma with acute exacerbation  Assessment & Plan  · Presented with increased shortness of breath and wheezing with productive cough  · CXR negative  · Overall improvement in wheezing and SOB however still with persistent cough and now with worsening LE Edema and orthopnea (see below)  · Wean Solu-Medrol to 40 mg q 12, anticipate transition to prednisone tomorrow  · Continue nebulizers/pulmonary toilet  · On room air    Cardiomyopathy [facility])  Assessment & Plan  · Continues to complain of increased lower extremity edema and orthopnea  Most recent weight 345 to 350 per patient  353 today  · Echo 07/2021 EF 50% with akinesis of basal inferior and basal inferolateral walls, G1DD  · Will give additional intravenous Lasix today  · I&Os, daily weights, low-sodium diet  · Outpatient cardiology f/u with Dr Zuleika Feldman    Acute renal failure (ARF) ([facility] )  Assessment & Plan  · POA, baseline appears 0 8-1 2  1 86 today  Suspect secondary to volume overload  · Additional IV lasix today  · Continue to hold ACEI  · Avoid nephrotoxins and hypotension  · BMP in AM    Essential hypertension  Assessment & Plan  · Blood pressure acceptable  Holding lisinopril secondary to a KI  Morbid obesity ([facility] )  Assessment & Plan  · Suspect morbid obesity is contributing to shortness of breath  · Dietary and lifestyle modification advised  · Reports actually losing 20  Pounds over the past couple of months  Has history of gastric bypass surgery      VICKY (obstructive sleep apnea)  Assessment & Plan  · Continue nightly CPAP    Type 2 diabetes mellitus with hyperglycemia [facility])  Assessment & Plan  Lab Results   Component Value Date    HGBA1C 7 1 (H) 2021       Recent Labs     21  0612 21  1305 21  2202 21  0620   POCGLU 262* 289* 282* 261*       · Reasonably controlled as outpatient on metformin and Januvia  With hyperglycemia here however likely steroid induced  · Start Lantus 15 units q h s  and humalog 2 units TID with meals + SSI  · Carb controlled diet  · Monitor accuchecks and adjust as needed      VTE Pharmacologic Prophylaxis: VTE Score: 5 Moderate Risk (Score 3-4) - Pharmacological DVT Prophylaxis Ordered: heparin  Patient Centered Rounds: I performed bedside rounds with nursing staff today  Discussions with Specialists or Other Care Team Provider: nursing, case management     Education and Discussions with Family / Patient: Patient declined call to   Time Spent for Care: 30 minutes  More than 50% of total time spent on counseling and coordination of care as described above  Current Length of Stay: 2 day(s)  Current Patient Status: Inpatient   Certification Statement: The patient will continue to require additional inpatient hospital stay due to ANTONIO, need for intravenous diuretic, monitoring respiratory status  Discharge Plan: Anticipate discharge tomorrow to home  Code Status: Level 1 - Full Code    Subjective:   Patient states that shortness of breath and wheezing is improving  States that he had a "bad night" because he was unable to lay flat and sleep  Continues to have lower extremity edema which is worse than baseline  Most recent weight at home is 345-350 lbs  Objective:     Vitals:   Temp (24hrs), Av 1 °F (36 7 °C), Min:97 5 °F (36 4 °C), Max:98 8 °F (37 1 °C)    Temp:  [97 5 °F (36 4 °C)-98 8 °F (37 1 °C)] 97 5 °F (36 4 °C)  HR:  [86-99] 99  Resp:  [18] 18  BP: (154-169)/(70-88) 154/81  SpO2:  [93 %-96 %] 94 %  Body mass index is 46 63 kg/m²  Input and Output Summary (last 24 hours):      Intake/Output Summary (Last 24 hours) at 2021 1132  Last data filed at 2021 0900  Gross per 24 hour   Intake 120 ml   Output --   Net 120 ml       Physical Exam:   Physical Exam  Vitals and nursing note reviewed  Constitutional:       General: He is not in acute distress  Appearance: He is obese  Cardiovascular:      Rate and Rhythm: Normal rate  Pulmonary:      Breath sounds: Decreased breath sounds and wheezing (fine expiratory wheezing) present  Abdominal:      Tenderness: There is no abdominal tenderness  Musculoskeletal:         General: Swelling (bilateral lower extremities  ) present  Skin:     General: Skin is warm  Neurological:      Mental Status: He is alert and oriented to person, place, and time  Mental status is at baseline     Psychiatric:         Mood and Affect: Mood normal           Additional Data:     Labs:  Results from last 7 days   Lab Units 09/24/21  0604 09/22/21  2257   WBC Thousand/uL 8 60 8 54   HEMOGLOBIN g/dL 12 1 11 9*   HEMATOCRIT % 36 9 37 0   PLATELETS Thousands/uL 227 256   NEUTROS PCT %  --  61   LYMPHS PCT %  --  17   MONOS PCT %  --  7   EOS PCT %  --  13*     Results from last 7 days   Lab Units 09/24/21  0604 09/22/21  2257   SODIUM mmol/L 134* 139   POTASSIUM mmol/L 4 8 4 9   CHLORIDE mmol/L 102 107   CO2 mmol/L 25 26   BUN mg/dL 45* 32*   CREATININE mg/dL 1 86* 1 65*   ANION GAP mmol/L 7 6   CALCIUM mg/dL 8 4 8 4   ALBUMIN g/dL  --  2 8*   TOTAL BILIRUBIN mg/dL  --  0 34   ALK PHOS U/L  --  94   ALT U/L  --  12   AST U/L  --  10   GLUCOSE RANDOM mg/dL 277* 115         Results from last 7 days   Lab Units 09/24/21  0620 09/23/21  2202 09/23/21  1305 09/23/21  0612   POC GLUCOSE mg/dl 261* 282* 289* 262*               Lines/Drains:  Invasive Devices     Peripheral Intravenous Line            Peripheral IV 09/22/21 Left Antecubital 1 day                      Imaging: Reviewed radiology reports from this admission including: chest xray    Recent Cultures (last 7 days):         Last 24 Hours Medication List:   Current Facility-Administered Medications   Medication Dose Route Frequency Provider Last Rate    acetaminophen  650 mg Oral Q6H PRN Michael Rasheed, DO      albuterol  2 puff Inhalation Q4H PRN Michael Rasheed, DO      atorvastatin  10 mg Oral Daily With [employer] DO Sheryl      budesonide  0 5 mg Nebulization Q12H Michael Rasheed, DO      furosemide  40 mg Intravenous Once RODRIGUE Walton      heparin (porcine)  7,500 Units Subcutaneous Q8H [address] Michael Rasheed DO      insulin glargine  15 Units Subcutaneous HS RODRIGUE Luna      insulin lispro  1-5 Units Subcutaneous TID AC Ilda Hovoer PA-C      insulin lispro  1-5 Units Subcutaneous HS Ilda Hoover PA-C      insulin lispro  2 Units Subcutaneous TID With Meals RODRIGUE Rausch      levalbuterol  1 25 mg Nebulization TID Michael Rasheed DO      And    sodium chloride  3 mL Nebulization TID Michael Rasheed DO      levalbuterol  1 25 mg Nebulization Q6H PRN Michael Rasheed DO      And    sodium chloride  3 mL Nebulization Q6H PRN Michael Rasheed DO      methylPREDNISolone sodium succinate  40 mg Intravenous Q12H [address] RODRIGUE Luna      montelukast  10 mg Oral HS Michael Rasheed, DO      multivitamin-minerals  1 tablet Oral Daily Michael Rasheed DO      ondansetron  4 mg Intravenous Q6H PRN Michael Rasheed DO          Today, Patient Was Seen By: RODRIGUE Walton    **Please Note: This note may have been constructed using a voice recognition system  **

## 2021-09-24 NOTE — ASSESSMENT & PLAN NOTE
Lab Results   Component Value Date    HGBA1C 7 1 (H) 07/18/2021       Recent Labs     09/23/21  0612 09/23/21  1305 09/23/21  2202 09/24/21  0620   POCGLU 262* 289* 282* 261*       · Reasonably controlled as outpatient on metformin and Januvia  With hyperglycemia here however likely steroid induced    · Start Lantus 15 units q h s  and humalog 2 units TID with meals + SSI  · Carb controlled diet  · Monitor accuchecks and adjust as needed

## 2021-09-24 NOTE — PLAN OF CARE
Problem: Potential for Falls  Goal: Patient will remain free of falls  Description: INTERVENTIONS:  - Educate patient/family on patient safety including physical limitations  - Instruct patient to call for assistance with activity   - Consult OT/PT to assist with strengthening/mobility   - Keep Call bell within reach  - Keep bed low and locked with side rails adjusted as appropriate  - Keep care items and personal belongings within reach  - Initiate and maintain comfort rounds  - Make Fall Risk Sign visible to staff  - Offer Toileting every 2 Hours, in advance of need  - Initiate/Maintain bed alarm  - Apply yellow socks and bracelet for high fall risk patients  - Consider moving patient to room near nurses station  Outcome: Progressing     Problem: PAIN - ADULT  Goal: Verbalizes/displays adequate comfort level or baseline comfort level  Description: Interventions:  - Encourage patient to monitor pain and request assistance  - Assess pain using appropriate pain scale  - Administer analgesics based on type and severity of pain and evaluate response  - Implement non-pharmacological measures as appropriate and evaluate response  - Consider cultural and social influences on pain and pain management  - Notify physician/advanced practitioner if interventions unsuccessful or patient reports new pain  Outcome: Progressing     Problem: INFECTION - ADULT  Goal: Absence or prevention of progression during hospitalization  Description: INTERVENTIONS:  - Assess and monitor for signs and symptoms of infection  - Monitor lab/diagnostic results  - Monitor all insertion sites, i e  indwelling lines, tubes, and drains  - [place] appropriate cooling/warming therapies per order  - Administer medications as ordered  - Instruct and encourage patient and family to use good hand hygiene technique  - Identify and instruct in appropriate isolation precautions for identified infection/condition  Outcome: Progressing  Goal: Absence of fever/infection during neutropenic period  Description: INTERVENTIONS:  - Monitor WBC    Outcome: Progressing     Problem: SAFETY ADULT  Goal: Patient will remain free of falls  Description: INTERVENTIONS:  - Educate patient/family on patient safety including physical limitations  - Instruct patient to call for assistance with activity   - Consult OT/PT to assist with strengthening/mobility   - Keep Call bell within reach  - Keep bed low and locked with side rails adjusted as appropriate  - Keep care items and personal belongings within reach  - Initiate and maintain comfort rounds  - Make Fall Risk Sign visible to staff  - Offer Toileting every 2 Hours, in advance of need  - Initiate/Maintain bed alarm  - Apply yellow socks and bracelet for high fall risk patients  - Consider moving patient to room near nurses station  Outcome: Progressing  Goal: Maintain or return to baseline ADL function  Description: INTERVENTIONS:  -  Assess patient's ability to carry out ADLs; assess patient's baseline for ADL function and identify physical deficits which impact ability to perform ADLs (bathing, care of mouth/teeth, toileting, grooming, dressing, etc )  - Assess/evaluate cause of self-care deficits   - Assess range of motion  - Assess patient's mobility; develop plan if impaired  - Assess patient's need for assistive devices and provide as appropriate  - Encourage maximum independence but intervene and supervise when necessary  - Involve family in performance of ADLs  - Assess for home care needs following discharge   - Consider OT consult to assist with ADL evaluation and planning for discharge  - Provide patient education as appropriate  Outcome: Progressing  Goal: Maintains/Returns to pre admission functional level  Description: INTERVENTIONS:  - Perform BMAT or MOVE assessment daily    - Set and communicate daily mobility goal to care team and patient/family/caregiver     - Collaborate with rehabilitation services on mobility goals if consulted  - Perform Range of Motion 3 times a day  - Reposition patient every 2 hours  - Dangle patient 3 times a day  - Stand patient 3 times a day  - Ambulate patient 3 times a day  - Out of bed to chair 3 times a day   - Out of bed for meals 3 times a day  - Out of bed for toileting  - Record patient progress and toleration of activity level   Outcome: Progressing     Problem: DISCHARGE PLANNING  Goal: Discharge to home or other facility with appropriate resources  Description: INTERVENTIONS:  - Identify barriers to discharge w/patient and caregiver  - Arrange for needed discharge resources and transportation as appropriate  - Identify discharge learning needs (meds, wound care, etc )  - Arrange for interpretive services to assist at discharge as needed  - Refer to Case Management Department for coordinating discharge planning if the patient needs post-hospital services based on physician/advanced practitioner order or complex needs related to functional status, cognitive ability, or social support system  Outcome: Progressing     Problem: Knowledge Deficit  Goal: Patient/family/caregiver demonstrates understanding of disease process, treatment plan, medications, and discharge instructions  Description: Complete learning assessment and assess knowledge base    Interventions:  - Provide teaching at level of understanding  - Provide teaching via preferred learning methods  Outcome: Progressing

## 2021-09-25 LAB
ANION GAP SERPL CALCULATED.3IONS-SCNC: 6 MMOL/L (ref 4–13)
BUN SERPL-MCNC: 48 MG/DL (ref 5–25)
CALCIUM SERPL-MCNC: 8.3 MG/DL (ref 8.3–10.1)
CHLORIDE SERPL-SCNC: 102 MMOL/L (ref 100–108)
CO2 SERPL-SCNC: 26 MMOL/L (ref 21–32)
CREAT SERPL-MCNC: 1.75 MG/DL (ref 0.6–1.3)
GFR SERPL CREATININE-BSD FRML MDRD: 43 ML/MIN/1.73SQ M
GLUCOSE SERPL-MCNC: 166 MG/DL (ref 65–140)
GLUCOSE SERPL-MCNC: 215 MG/DL (ref 65–140)
GLUCOSE SERPL-MCNC: 240 MG/DL (ref 65–140)
GLUCOSE SERPL-MCNC: 262 MG/DL (ref 65–140)
GLUCOSE SERPL-MCNC: 270 MG/DL (ref 65–140)
POTASSIUM SERPL-SCNC: 4.6 MMOL/L (ref 3.5–5.3)
SODIUM SERPL-SCNC: 134 MMOL/L (ref 136–145)

## 2021-09-25 PROCEDURE — 94760 N-INVAS EAR/PLS OXIMETRY 1: CPT

## 2021-09-25 PROCEDURE — 80048 BASIC METABOLIC PNL TOTAL CA: CPT | Performed by: NURSE PRACTITIONER

## 2021-09-25 PROCEDURE — 82948 REAGENT STRIP/BLOOD GLUCOSE: CPT

## 2021-09-25 PROCEDURE — 94640 AIRWAY INHALATION TREATMENT: CPT

## 2021-09-25 PROCEDURE — 99232 SBSQ HOSP IP/OBS MODERATE 35: CPT | Performed by: NURSE PRACTITIONER

## 2021-09-25 RX ORDER — HYDROCODONE POLISTIREX AND CHLORPHENIRAMINE POLISTIREX 10; 8 MG/5ML; MG/5ML
5 SUSPENSION, EXTENDED RELEASE ORAL
Status: DISCONTINUED | OUTPATIENT
Start: 2021-09-25 | End: 2021-09-26 | Stop reason: HOSPADM

## 2021-09-25 RX ORDER — HYDROCODONE POLISTIREX AND CHLORPHENIRAMINE POLISTIREX 10; 8 MG/5ML; MG/5ML
5 SUSPENSION, EXTENDED RELEASE ORAL DAILY PRN
Status: DISCONTINUED | OUTPATIENT
Start: 2021-09-25 | End: 2021-09-26 | Stop reason: HOSPADM

## 2021-09-25 RX ORDER — INSULIN GLARGINE 100 [IU]/ML
20 INJECTION, SOLUTION SUBCUTANEOUS
Status: DISCONTINUED | OUTPATIENT
Start: 2021-09-25 | End: 2021-09-26 | Stop reason: HOSPADM

## 2021-09-25 RX ORDER — FUROSEMIDE 10 MG/ML
40 INJECTION INTRAMUSCULAR; INTRAVENOUS ONCE
Status: COMPLETED | OUTPATIENT
Start: 2021-09-25 | End: 2021-09-25

## 2021-09-25 RX ORDER — GUAIFENESIN 600 MG
1200 TABLET, EXTENDED RELEASE 12 HR ORAL EVERY 12 HOURS SCHEDULED
Status: DISCONTINUED | OUTPATIENT
Start: 2021-09-25 | End: 2021-09-26 | Stop reason: HOSPADM

## 2021-09-25 RX ORDER — BENZONATATE 100 MG/1
200 CAPSULE ORAL 3 TIMES DAILY
Status: DISCONTINUED | OUTPATIENT
Start: 2021-09-25 | End: 2021-09-26 | Stop reason: HOSPADM

## 2021-09-25 RX ADMIN — BUDESONIDE 0.5 MG: 0.5 INHALANT ORAL at 19:57

## 2021-09-25 RX ADMIN — BENZONATATE 200 MG: 100 CAPSULE ORAL at 17:10

## 2021-09-25 RX ADMIN — INSULIN LISPRO 2 UNITS: 100 INJECTION, SOLUTION INTRAVENOUS; SUBCUTANEOUS at 06:52

## 2021-09-25 RX ADMIN — INSULIN GLARGINE 20 UNITS: 100 INJECTION, SOLUTION SUBCUTANEOUS at 21:42

## 2021-09-25 RX ADMIN — ISODIUM CHLORIDE 3 ML: 0.03 SOLUTION RESPIRATORY (INHALATION) at 14:44

## 2021-09-25 RX ADMIN — MONTELUKAST 10 MG: 10 TABLET, FILM COATED ORAL at 21:34

## 2021-09-25 RX ADMIN — METHYLPREDNISOLONE SODIUM SUCCINATE 40 MG: 40 INJECTION, POWDER, FOR SOLUTION INTRAMUSCULAR; INTRAVENOUS at 21:38

## 2021-09-25 RX ADMIN — BENZONATATE 200 MG: 100 CAPSULE ORAL at 11:33

## 2021-09-25 RX ADMIN — FUROSEMIDE 40 MG: 10 INJECTION, SOLUTION INTRAMUSCULAR; INTRAVENOUS at 17:11

## 2021-09-25 RX ADMIN — LEVALBUTEROL HYDROCHLORIDE 1.25 MG: 1.25 SOLUTION, CONCENTRATE RESPIRATORY (INHALATION) at 08:01

## 2021-09-25 RX ADMIN — INSULIN LISPRO 2 UNITS: 100 INJECTION, SOLUTION INTRAVENOUS; SUBCUTANEOUS at 11:33

## 2021-09-25 RX ADMIN — ISODIUM CHLORIDE 3 ML: 0.03 SOLUTION RESPIRATORY (INHALATION) at 19:57

## 2021-09-25 RX ADMIN — FUROSEMIDE 40 MG: 10 INJECTION, SOLUTION INTRAMUSCULAR; INTRAVENOUS at 08:16

## 2021-09-25 RX ADMIN — HEPARIN SODIUM 7500 UNITS: 5000 INJECTION INTRAVENOUS; SUBCUTANEOUS at 21:37

## 2021-09-25 RX ADMIN — METHYLPREDNISOLONE SODIUM SUCCINATE 40 MG: 40 INJECTION, POWDER, FOR SOLUTION INTRAMUSCULAR; INTRAVENOUS at 08:16

## 2021-09-25 RX ADMIN — HEPARIN SODIUM 7500 UNITS: 5000 INJECTION INTRAVENOUS; SUBCUTANEOUS at 06:52

## 2021-09-25 RX ADMIN — BENZONATATE 200 MG: 100 CAPSULE ORAL at 21:35

## 2021-09-25 RX ADMIN — INSULIN LISPRO 1 UNITS: 100 INJECTION, SOLUTION INTRAVENOUS; SUBCUTANEOUS at 21:39

## 2021-09-25 RX ADMIN — MULTIPLE VITAMINS W/ MINERALS TAB 1 TABLET: TAB ORAL at 08:15

## 2021-09-25 RX ADMIN — BUDESONIDE 0.5 MG: 0.5 INHALANT ORAL at 08:01

## 2021-09-25 RX ADMIN — LEVALBUTEROL HYDROCHLORIDE 1.25 MG: 1.25 SOLUTION, CONCENTRATE RESPIRATORY (INHALATION) at 19:58

## 2021-09-25 RX ADMIN — HYDROCODONE POLISTIREX AND CHLORPHENIRAMINE POLISTIREX 5 ML: 10; 8 SUSPENSION, EXTENDED RELEASE ORAL at 21:37

## 2021-09-25 RX ADMIN — HEPARIN SODIUM 7500 UNITS: 5000 INJECTION INTRAVENOUS; SUBCUTANEOUS at 13:26

## 2021-09-25 RX ADMIN — GUAIFENESIN 1200 MG: 600 TABLET, EXTENDED RELEASE ORAL at 21:34

## 2021-09-25 RX ADMIN — LEVALBUTEROL HYDROCHLORIDE 1.25 MG: 1.25 SOLUTION, CONCENTRATE RESPIRATORY (INHALATION) at 14:43

## 2021-09-25 RX ADMIN — INSULIN LISPRO 1 UNITS: 100 INJECTION, SOLUTION INTRAVENOUS; SUBCUTANEOUS at 17:12

## 2021-09-25 RX ADMIN — ATORVASTATIN CALCIUM 10 MG: 10 TABLET, FILM COATED ORAL at 17:11

## 2021-09-25 RX ADMIN — GUAIFENESIN 1200 MG: 600 TABLET, EXTENDED RELEASE ORAL at 11:33

## 2021-09-25 NOTE — ASSESSMENT & PLAN NOTE
· Continues to complain of increased lower extremity edema and orthopnea  Most recent weight 345 to 350 per patient  353 most recently  · Echo 07/2021 EF 50% with akinesis of basal inferior and basal inferolateral walls, G1DD  · Will give additional intravenous Lasix today    · I&Os, daily weights, low-sodium diet  · Outpatient cardiology f/u with Dr Iain Boles

## 2021-09-25 NOTE — PROGRESS NOTES
[address] [facility]  Progress Note - Nenita Oliver 1965, 49 y o  male MRN: [id number]  Unit/Bed#: [facility] 710-01 Encounter: [id number]  Primary Care Provider: Cammie Gaines MD   Date and time admitted to hospital: 9/22/2021 10:07 PM    * Moderate persistent asthma with acute exacerbation  Assessment & Plan  · Presented with increased shortness of breath and wheezing with productive cough  · CXR negative  · Overall improvement in wheezing and SOB however still with persistent cough and now with worsening LE Edema and orthopnea (see below)  · Continue Solu-Medrol 40 mg q 12  · Continue nebulizers/pulmonary toilet  · On room air    Cardiomyopathy ([facility] )  Assessment & Plan  · Continues to complain of increased lower extremity edema and orthopnea  Most recent weight 345 to 350 per patient  353 most recently  · Echo 07/2021 EF 50% with akinesis of basal inferior and basal inferolateral walls, G1DD  · Will give additional intravenous Lasix today  · I&Os, daily weights, low-sodium diet  · Outpatient cardiology f/u with Dr Ilene Gomez    Acute renal failure (ARF) [facility])  Assessment & Plan  · POA, baseline appears 0 8-1 2  Improved to 1 75 today  Suspect secondary to volume overload  · Additional IV lasix today  · Continue to hold ACEI  · Avoid nephrotoxins and hypotension  · BMP in AM    Essential hypertension  Assessment & Plan  · Blood pressure acceptable  Holding lisinopril secondary to ANTONIO  Morbid obesity ([facility] )  Assessment & Plan  · Suspect morbid obesity is contributing to shortness of breath  · Dietary and lifestyle modification advised  · Reports actually losing 20  Pounds over the past couple of months  Has history of gastric bypass surgery      VICKY (obstructive sleep apnea)  Assessment & Plan  · Continue nightly CPAP    Type 2 diabetes mellitus with hyperglycemia [facility])  Assessment & Plan  Lab Results   Component Value Date    HGBA1C 7 1 (H) 07/18/2021       Recent Labs 21  1145 21  1604 21  2108 21  0618   POCGLU 264* 258* 252* 240*       · Reasonably controlled as outpatient on metformin and Januvia  With hyperglycemia here however likely steroid induced  · Increase Lantus 20 units q h s  and humalog 4 units TID with meals + SSI  · Carb controlled diet  · Monitor accuchecks and adjust as needed      VTE Pharmacologic Prophylaxis: VTE Score: 5 Moderate Risk (Score 3-4) - Pharmacological DVT Prophylaxis Ordered: heparin  Patient Centered Rounds: I performed bedside rounds with nursing staff today  Discussions with Specialists or Other Care Team Provider: nursing    Education and Discussions with Family / Patient: Patient declined call to   Time Spent for Care: 30 minutes  More than 50% of total time spent on counseling and coordination of care as described above  Current Length of Stay: 3 day(s)  Current Patient Status: Inpatient   Certification Statement: The patient will continue to require additional inpatient hospital stay due to IV steroids, IV diuretics  Discharge Plan: Anticipate discharge in 24-48 hrs to home  Code Status: Level 1 - Full Code    Subjective:   Patient reports that wheezing has improved  Cough is improving, nonproductive  Still with lower extremity edema and orthopnea and trouble sleeping secondary to this  Objective:     Vitals:   Temp (24hrs), Av 6 °F (36 4 °C), Min:97 4 °F (36 3 °C), Max:97 9 °F (36 6 °C)    Temp:  [97 4 °F (36 3 °C)-97 9 °F (36 6 °C)] 97 4 °F (36 3 °C)  HR:  [] 101  Resp:  [18] 18  BP: (137-166)/() 137/82  SpO2:  [95 %-99 %] 99 %  Body mass index is 46 63 kg/m²  Input and Output Summary (last 24 hours): Intake/Output Summary (Last 24 hours) at 2021 1039  Last data filed at 2021 0103  Gross per 24 hour   Intake 480 ml   Output --   Net 480 ml       Physical Exam:   Physical Exam  Vitals and nursing note reviewed     Constitutional:       Appearance: He is obese  Cardiovascular:      Rate and Rhythm: Normal rate  Pulmonary:      Comments: Breath sounds course, fine expiratory wheezes   Abdominal:      Tenderness: There is no abdominal tenderness  Musculoskeletal:         General: Swelling (+2 pitting lower extremities ) present  Skin:     General: Skin is warm  Neurological:      Mental Status: He is alert and oriented to person, place, and time  Mental status is at baseline  Psychiatric:         Mood and Affect: Mood normal           Additional Data:     Labs:  Results from last 7 days   Lab Units 09/24/21  0604 09/22/21  2257   WBC Thousand/uL 8 60 8 54   HEMOGLOBIN g/dL 12 1 11 9*   HEMATOCRIT % 36 9 37 0   PLATELETS Thousands/uL 227 256   NEUTROS PCT %  --  61   LYMPHS PCT %  --  17   MONOS PCT %  --  7   EOS PCT %  --  13*     Results from last 7 days   Lab Units 09/25/21  0536 09/22/21  2257   SODIUM mmol/L 134* 139   POTASSIUM mmol/L 4 6 4 9   CHLORIDE mmol/L 102 107   CO2 mmol/L 26 26   BUN mg/dL 48* 32*   CREATININE mg/dL 1 75* 1 65*   ANION GAP mmol/L 6 6   CALCIUM mg/dL 8 3 8 4   ALBUMIN g/dL  --  2 8*   TOTAL BILIRUBIN mg/dL  --  0 34   ALK PHOS U/L  --  94   ALT U/L  --  12   AST U/L  --  10   GLUCOSE RANDOM mg/dL 270* 115         Results from last 7 days   Lab Units 09/25/21  0618 09/24/21  2108 09/24/21  1604 09/24/21  1145 09/24/21  0620 09/23/21  2202 09/23/21  1305 09/23/21  0612   POC GLUCOSE mg/dl 240* 252* 258* 264* 261* 282* 289* 262*               Lines/Drains:  Invasive Devices     Peripheral Intravenous Line            Peripheral IV 09/22/21 Left Antecubital 2 days                      Imaging: No pertinent imaging reviewed      Recent Cultures (last 7 days):         Last 24 Hours Medication List:   Current Facility-Administered Medications   Medication Dose Route Frequency Provider Last Rate    acetaminophen  650 mg Oral Q6H PRN Ashley Morning, DO      albuterol  2 puff Inhalation Q4H PRN Ashley Morning, DO     [facility] atorvastatin  10 mg Oral Daily With [employer] Sheryl, DO      budesonide  0 5 mg Nebulization Q12H Gillermina Joel, DO      furosemide  40 mg Intravenous Once RODRIGUE Lindquist      heparin (porcine)  7,500 Units Subcutaneous Q8H [facility], DO      hydrocodone-chlorpheniramine polistirex  5 mL Oral HS RODRIGUE Mak      hydrocodone-chlorpheniramine polistirex  5 mL Oral Daily PRN RODRIGUE Luna      insulin glargine  20 Units Subcutaneous HS RODRIGUE Luna      insulin lispro  1-5 Units Subcutaneous TID AC Ilda Hoover PA-C      insulin lispro  1-5 Units Subcutaneous HS Ilda Hoover PA-C      insulin lispro  4 Units Subcutaneous TID With Meals RODRIGUE Mak      levalbuterol  1 25 mg Nebulization TID Gillermina Joel, DO      And    sodium chloride  3 mL Nebulization TID Gillermina Wisam, DO      levalbuterol  1 25 mg Nebulization Q6H PRN Gillermina Joel, DO      And    sodium chloride  3 mL Nebulization Q6H PRN Gillermina Joel, DO      methylPREDNISolone sodium succinate  40 mg Intravenous Q12H [address] RODRIGUE Luna      montelukast  10 mg Oral HS Gillermina Joel, DO      multivitamin-minerals  1 tablet Oral Daily Gillermina Joel, DO      ondansetron  4 mg Intravenous Q6H PRN Gillermina Joel, DO          Today, Patient Was Seen By: RODRIGUE Lindquist    **Please Note: This note may have been constructed using a voice recognition system  **

## 2021-09-25 NOTE — ASSESSMENT & PLAN NOTE
· POA, baseline appears 0 8-1 2  Improved to 1 75 today  Suspect secondary to volume overload     · Additional IV lasix today  · Continue to hold ACEI  · Avoid nephrotoxins and hypotension  · BMP in AM

## 2021-09-25 NOTE — ASSESSMENT & PLAN NOTE
Lab Results   Component Value Date    HGBA1C 7 1 (H) 07/18/2021       Recent Labs     09/24/21  1145 09/24/21  1604 09/24/21  2108 09/25/21  0618   POCGLU 264* 258* 252* 240*       · Reasonably controlled as outpatient on metformin and Januvia  With hyperglycemia here however likely steroid induced    · Increase Lantus 20 units q h s  and humalog 4 units TID with meals + SSI  · Carb controlled diet  · Monitor accuchecks and adjust as needed

## 2021-09-25 NOTE — ASSESSMENT & PLAN NOTE
· Presented with increased shortness of breath and wheezing with productive cough  · CXR negative  · Overall improvement in wheezing and SOB however still with persistent cough and now with worsening LE Edema and orthopnea (see below)  · Continue Solu-Medrol 40 mg q 12  · Continue nebulizers/pulmonary toilet  · On room air

## 2021-09-26 VITALS
BODY MASS INDEX: 41.75 KG/M2 | OXYGEN SATURATION: 96 % | HEIGHT: 73 IN | SYSTOLIC BLOOD PRESSURE: 97 MMHG | TEMPERATURE: 97.6 F | RESPIRATION RATE: 20 BRPM | HEART RATE: 101 BPM | DIASTOLIC BLOOD PRESSURE: 69 MMHG | WEIGHT: 315 LBS

## 2021-09-26 LAB
ANION GAP SERPL CALCULATED.3IONS-SCNC: 5 MMOL/L (ref 4–13)
BUN SERPL-MCNC: 47 MG/DL (ref 5–25)
CALCIUM SERPL-MCNC: 8.4 MG/DL (ref 8.3–10.1)
CHLORIDE SERPL-SCNC: 100 MMOL/L (ref 100–108)
CO2 SERPL-SCNC: 29 MMOL/L (ref 21–32)
CREAT SERPL-MCNC: 1.75 MG/DL (ref 0.6–1.3)
GFR SERPL CREATININE-BSD FRML MDRD: 43 ML/MIN/1.73SQ M
GLUCOSE SERPL-MCNC: 181 MG/DL (ref 65–140)
GLUCOSE SERPL-MCNC: 224 MG/DL (ref 65–140)
GLUCOSE SERPL-MCNC: 225 MG/DL (ref 65–140)
POTASSIUM SERPL-SCNC: 4.4 MMOL/L (ref 3.5–5.3)
SODIUM SERPL-SCNC: 134 MMOL/L (ref 136–145)

## 2021-09-26 PROCEDURE — 99239 HOSP IP/OBS DSCHRG MGMT >30: CPT | Performed by: NURSE PRACTITIONER

## 2021-09-26 PROCEDURE — 80048 BASIC METABOLIC PNL TOTAL CA: CPT | Performed by: NURSE PRACTITIONER

## 2021-09-26 PROCEDURE — 94640 AIRWAY INHALATION TREATMENT: CPT

## 2021-09-26 PROCEDURE — 82948 REAGENT STRIP/BLOOD GLUCOSE: CPT

## 2021-09-26 PROCEDURE — 94760 N-INVAS EAR/PLS OXIMETRY 1: CPT

## 2021-09-26 RX ORDER — HYDROCODONE POLISTIREX AND CHLORPHENIRAMINE POLISTIREX 10; 8 MG/5ML; MG/5ML
5 SUSPENSION, EXTENDED RELEASE ORAL EVERY 12 HOURS PRN
Qty: 50 ML | Refills: 0 | Status: SHIPPED | OUTPATIENT
Start: 2021-09-26 | End: 2021-10-06

## 2021-09-26 RX ORDER — PREDNISONE 10 MG/1
TABLET ORAL
Qty: 30 TABLET | Refills: 0 | Status: SHIPPED | OUTPATIENT
Start: 2021-09-27 | End: 2021-10-09

## 2021-09-26 RX ORDER — FUROSEMIDE 10 MG/ML
40 INJECTION INTRAMUSCULAR; INTRAVENOUS ONCE
Status: COMPLETED | OUTPATIENT
Start: 2021-09-26 | End: 2021-09-26

## 2021-09-26 RX ADMIN — INSULIN LISPRO 1 UNITS: 100 INJECTION, SOLUTION INTRAVENOUS; SUBCUTANEOUS at 11:25

## 2021-09-26 RX ADMIN — INSULIN LISPRO 2 UNITS: 100 INJECTION, SOLUTION INTRAVENOUS; SUBCUTANEOUS at 07:49

## 2021-09-26 RX ADMIN — FUROSEMIDE 40 MG: 10 INJECTION, SOLUTION INTRAMUSCULAR; INTRAVENOUS at 09:59

## 2021-09-26 RX ADMIN — LEVALBUTEROL HYDROCHLORIDE 1.25 MG: 1.25 SOLUTION, CONCENTRATE RESPIRATORY (INHALATION) at 07:13

## 2021-09-26 RX ADMIN — BUDESONIDE 0.5 MG: 0.5 INHALANT ORAL at 07:13

## 2021-09-26 RX ADMIN — MULTIPLE VITAMINS W/ MINERALS TAB 1 TABLET: TAB ORAL at 08:25

## 2021-09-26 RX ADMIN — BENZONATATE 200 MG: 100 CAPSULE ORAL at 08:25

## 2021-09-26 RX ADMIN — HEPARIN SODIUM 7500 UNITS: 5000 INJECTION INTRAVENOUS; SUBCUTANEOUS at 05:17

## 2021-09-26 RX ADMIN — ISODIUM CHLORIDE 3 ML: 0.03 SOLUTION RESPIRATORY (INHALATION) at 07:13

## 2021-09-26 RX ADMIN — METHYLPREDNISOLONE SODIUM SUCCINATE 40 MG: 40 INJECTION, POWDER, FOR SOLUTION INTRAMUSCULAR; INTRAVENOUS at 08:25

## 2021-09-26 RX ADMIN — GUAIFENESIN 1200 MG: 600 TABLET, EXTENDED RELEASE ORAL at 08:25

## 2021-09-26 NOTE — PLAN OF CARE
Problem: Potential for Falls  Goal: Patient will remain free of falls  Description: INTERVENTIONS:  - Educate patient/family on patient safety including physical limitations  - Instruct patient to call for assistance with activity   - Consult OT/PT to assist with strengthening/mobility   - Keep Call bell within reach  - Keep bed low and locked with side rails adjusted as appropriate  - Keep care items and personal belongings within reach  - Initiate and maintain comfort rounds  - Make Fall Risk Sign visible to staff  - Offer Toileting every 2 Hours, in advance of need  - Initiate/Maintain bed alarm  - Apply yellow socks and bracelet for high fall risk patients  - Consider moving patient to room near nurses station  Outcome: Adequate for Discharge     Problem: PAIN - ADULT  Goal: Verbalizes/displays adequate comfort level or baseline comfort level  Description: Interventions:  - Encourage patient to monitor pain and request assistance  - Assess pain using appropriate pain scale  - Administer analgesics based on type and severity of pain and evaluate response  - Implement non-pharmacological measures as appropriate and evaluate response  - Consider cultural and social influences on pain and pain management  - Notify physician/advanced practitioner if interventions unsuccessful or patient reports new pain  Outcome: Adequate for Discharge     Problem: INFECTION - ADULT  Goal: Absence or prevention of progression during hospitalization  Description: INTERVENTIONS:  - Assess and monitor for signs and symptoms of infection  - Monitor lab/diagnostic results  - Monitor all insertion sites, i e  indwelling lines, tubes, and drains  - [place] appropriate cooling/warming therapies per order  - Administer medications as ordered  - Instruct and encourage patient and family to use good hand hygiene technique  - Identify and instruct in appropriate isolation precautions for identified infection/condition  Outcome: Adequate for Discharge  Goal: Absence of fever/infection during neutropenic period  Description: INTERVENTIONS:  - Monitor WBC    Outcome: Adequate for Discharge     Problem: SAFETY ADULT  Goal: Patient will remain free of falls  Description: INTERVENTIONS:  - Educate patient/family on patient safety including physical limitations  - Instruct patient to call for assistance with activity   - Consult OT/PT to assist with strengthening/mobility   - Keep Call bell within reach  - Keep bed low and locked with side rails adjusted as appropriate  - Keep care items and personal belongings within reach  - Initiate and maintain comfort rounds  - Make Fall Risk Sign visible to staff  - Offer Toileting every 2 Hours, in advance of need  - Initiate/Maintain bed alarm  - Apply yellow socks and bracelet for high fall risk patients  - Consider moving patient to room near nurses station  Outcome: Adequate for Discharge  Goal: Maintain or return to baseline ADL function  Description: INTERVENTIONS:  -  Assess patient's ability to carry out ADLs; assess patient's baseline for ADL function and identify physical deficits which impact ability to perform ADLs (bathing, care of mouth/teeth, toileting, grooming, dressing, etc )  - Assess/evaluate cause of self-care deficits   - Assess range of motion  - Assess patient's mobility; develop plan if impaired  - Assess patient's need for assistive devices and provide as appropriate  - Encourage maximum independence but intervene and supervise when necessary  - Involve family in performance of ADLs  - Assess for home care needs following discharge   - Consider OT consult to assist with ADL evaluation and planning for discharge  - Provide patient education as appropriate  Outcome: Adequate for Discharge  Goal: Maintains/Returns to pre admission functional level  Description: INTERVENTIONS:  - Perform BMAT or MOVE assessment daily    - Set and communicate daily mobility goal to care team and patient/family/caregiver  - Collaborate with rehabilitation services on mobility goals if consulted  - Perform Range of Motion 3 times a day  - Reposition patient every 2 hours  - Dangle patient 3 times a day  - Stand patient 3 times a day  - Ambulate patient 3 times a day  - Out of bed to chair 3 times a day   - Out of bed for meals 3 times a day  - Out of bed for toileting  - Record patient progress and toleration of activity level   Outcome: Adequate for Discharge     Problem: DISCHARGE PLANNING  Goal: Discharge to home or other facility with appropriate resources  Description: INTERVENTIONS:  - Identify barriers to discharge w/patient and caregiver  - Arrange for needed discharge resources and transportation as appropriate  - Identify discharge learning needs (meds, wound care, etc )  - Arrange for interpretive services to assist at discharge as needed  - Refer to Case Management Department for coordinating discharge planning if the patient needs post-hospital services based on physician/advanced practitioner order or complex needs related to functional status, cognitive ability, or social support system  Outcome: Adequate for Discharge     Problem: Knowledge Deficit  Goal: Patient/family/caregiver demonstrates understanding of disease process, treatment plan, medications, and discharge instructions  Description: Complete learning assessment and assess knowledge base    Interventions:  - Provide teaching at level of understanding  - Provide teaching via preferred learning methods  Outcome: Adequate for Discharge     Problem: MOBILITY - ADULT  Goal: Maintain or return to baseline ADL function  Description: INTERVENTIONS:  -  Assess patient's ability to carry out ADLs; assess patient's baseline for ADL function and identify physical deficits which impact ability to perform ADLs (bathing, care of mouth/teeth, toileting, grooming, dressing, etc )  - Assess/evaluate cause of self-care deficits   - Assess range of motion  - Assess patient's mobility; develop plan if impaired  - Assess patient's need for assistive devices and provide as appropriate  - Encourage maximum independence but intervene and supervise when necessary  - Involve family in performance of ADLs  - Assess for home care needs following discharge   - Consider OT consult to assist with ADL evaluation and planning for discharge  - Provide patient education as appropriate  Outcome: Adequate for Discharge  Goal: Maintains/Returns to pre admission functional level  Description: INTERVENTIONS:  - Perform BMAT or MOVE assessment daily    - Set and communicate daily mobility goal to care team and patient/family/caregiver  - Collaborate with rehabilitation services on mobility goals if consulted  - Perform Range of Motion 3 times a day  - Reposition patient every 2 hours    - Dangle patient 3 times a day  - Stand patient 3 times a day  - Ambulate patient 3 times a day  - Out of bed to chair 3 times a day   - Out of bed for meals 3 times a day  - Out of bed for toileting  - Record patient progress and toleration of activity level   Outcome: Adequate for Discharge

## 2021-09-26 NOTE — DISCHARGE SUMMARY
Discharge Summary - [address] [facility]    Patient Information: Eleazar Amezcua 49 y o  male MRN: [id number]  Unit/Bed#: [facility] 710-01 Encounter: [id number]    Discharging Physician / Practitioner: Lala Kocher, CRNP  PCP: Radhika Mendiola MD  Admission Date: 9/22/2021  Discharge Date: 09/26/21    Reason for Admission:  Asthma exacerbation, ANTONIO, acute systolic heart failure exacerbation    Discharge Diagnoses:     Principal Problem: Moderate persistent asthma with acute exacerbation  Active Problems:    Cardiomyopathy ([facility] )    Acute renal failure (ARF) (HCC)    Essential hypertension    Morbid obesity (HCC)    VICKY (obstructive sleep apnea)    Type 2 diabetes mellitus with hyperglycemia ([facility] )  Resolved Problems:    * No resolved hospital problems  *      Consultations During Hospital Stay:  · None    Procedures Performed:     · None    Significant Findings / Test Results:     · Chest x-ray negative  · COVID-19 PCR negative  · Creatinine on day of discharge 1 75    Incidental Findings:   · None     Test Results Pending at Discharge (will require follow up): · None     Outpatient Tests Requested:  · Outpatient follow-up with PCP  · BMP in 1 week  · Outpatient follow-up with Cardiology and pulmonology    Complications:  None    Hospital Course:     Eleazar Amezcua is a 49 y o  male patient with past medical history of obesity status post gastric bypass, asthma, hypertension, VICKY, type 2 diabetes who originally presented to the hospital on 9/22/2021 due to shortness of breath, wheezing and productive cough  Chest x-ray negative  Started on steroids for acute on chronic asthma exacerbation with improvement  Also noted to have orthopnea and worsening lower extremity edema, was given several doses of intravenous diuretic for heart failure exacerbation  Lower extremity edema and orthopnea symptoms have improved  He does have an ANTONIO which was present on admission, has been stable with intravenous diuresis  Will hold ACE-inhibitor on discharge  Will have patient get follow-up BMP in 1 week with results to PCP and primary cardiologist     Patient is medically stable for discharge  Will transition to prednisone and taper on discharge  Ambulatory referral placed for pulmonology  Also instructed patient to reschedule follow-up appointment with cardiologist     Condition at Discharge: stable     Discharge Day Visit / Exam:     Subjective:  Patient offers no acute complaints  Finally was able to sleep last night  Lower extremity edema is improved  No shortness of breath or wheezing  Cough improving  Vitals: Blood Pressure: 97/69 (09/26/21 0829)  Pulse: 101 (09/26/21 0829)  Temperature: 97 6 °F (36 4 °C) (09/26/21 0829)  Temp Source: Oral (09/23/21 1625)  Respirations: 20 (09/25/21 1549)  Height: 6' 1" (185 4 cm) (09/23/21 1625)  Weight - Scale: (!) 168 kg (371 lb 0 6 oz) (09/26/21 0538)  SpO2: 96 % (09/26/21 0713)  Exam:   Physical Exam  Vitals and nursing note reviewed  Constitutional:       General: He is not in acute distress  Appearance: He is obese  Cardiovascular:      Rate and Rhythm: Normal rate  Pulmonary:      Breath sounds: Decreased breath sounds present  No wheezing or rales  Musculoskeletal:         General: Swelling (Bilateral lower extremities, overall improved) present  Skin:     General: Skin is warm  Neurological:      Mental Status: He is alert and oriented to person, place, and time  Mental status is at baseline  Psychiatric:         Mood and Affect: Mood normal          Discussion with Family:  Patient  Discharge instructions/Information to patient and family:   See after visit summary for information provided to patient and family  Provisions for Follow-Up Care:  See after visit summary for information related to follow-up care and any pertinent home health orders        Disposition:     Home    For Discharges to [facility] SNF:   · Not Applicable to this Patient - Not Applicable to this Patient    Planned Readmission: no     Discharge Statement:  I spent 40 minutes discharging the patient  This time was spent on the day of discharge  I had direct contact with the patient on the day of discharge  Greater than 50% of the total time was spent examining patient, answering all patient questions, arranging and discussing plan of care with patient as well as directly providing post-discharge instructions  Additional time then spent on discharge activities  Discharge Medications:  See after visit summary for reconciled discharge medications provided to patient and family        ** Please Note: This note has been constructed using a voice recognition system **

## 2021-09-26 NOTE — PLAN OF CARE
Problem: Potential for Falls  Goal: Patient will remain free of falls  Description: INTERVENTIONS:  - Educate patient/family on patient safety including physical limitations  - Instruct patient to call for assistance with activity   - Consult OT/PT to assist with strengthening/mobility   - Keep Call bell within reach  - Keep bed low and locked with side rails adjusted as appropriate  - Keep care items and personal belongings within reach  - Initiate and maintain comfort rounds  - Make Fall Risk Sign visible to staff  - Offer Toileting every 2 Hours, in advance of need  - Initiate/Maintain bed alarm  - Apply yellow socks and bracelet for high fall risk patients  - Consider moving patient to room near nurses station  9/26/2021 1058 by Jose Fields RN  Outcome: Adequate for Discharge  9/26/2021 1058 by Jose Fields RN  Outcome: Adequate for Discharge     Problem: PAIN - ADULT  Goal: Verbalizes/displays adequate comfort level or baseline comfort level  Description: Interventions:  - Encourage patient to monitor pain and request assistance  - Assess pain using appropriate pain scale  - Administer analgesics based on type and severity of pain and evaluate response  - Implement non-pharmacological measures as appropriate and evaluate response  - Consider cultural and social influences on pain and pain management  - Notify physician/advanced practitioner if interventions unsuccessful or patient reports new pain  9/26/2021 1058 by Jose Fields RN  Outcome: Adequate for Discharge  9/26/2021 1058 by Jose Fields RN  Outcome: Adequate for Discharge     Problem: INFECTION - ADULT  Goal: Absence or prevention of progression during hospitalization  Description: INTERVENTIONS:  - Assess and monitor for signs and symptoms of infection  - Monitor lab/diagnostic results  - Monitor all insertion sites, i e  indwelling lines, tubes, and drains  - [place] appropriate cooling/warming therapies per order  - Administer medications as ordered  - Instruct and encourage patient and family to use good hand hygiene technique  - Identify and instruct in appropriate isolation precautions for identified infection/condition  9/26/2021 1058 by Jose Ramon Kincaid RN  Outcome: Adequate for Discharge  9/26/2021 1058 by Jose Ramon Kincaid RN  Outcome: Adequate for Discharge  Goal: Absence of fever/infection during neutropenic period  Description: INTERVENTIONS:  - Monitor WBC    9/26/2021 1058 by Jose Ramon Kincaid RN  Outcome: Adequate for Discharge  9/26/2021 1058 by Jose Ramon Kincaid RN  Outcome: Adequate for Discharge     Problem: SAFETY ADULT  Goal: Patient will remain free of falls  Description: INTERVENTIONS:  - Educate patient/family on patient safety including physical limitations  - Instruct patient to call for assistance with activity   - Consult OT/PT to assist with strengthening/mobility   - Keep Call bell within reach  - Keep bed low and locked with side rails adjusted as appropriate  - Keep care items and personal belongings within reach  - Initiate and maintain comfort rounds  - Make Fall Risk Sign visible to staff  - Offer Toileting every 2 Hours, in advance of need  - Initiate/Maintain bed alarm  - Apply yellow socks and bracelet for high fall risk patients  - Consider moving patient to room near nurses station  9/26/2021 1058 by Jose Ramon Kincaid RN  Outcome: Adequate for Discharge  9/26/2021 1058 by Jose Ramon Kincaid RN  Outcome: Adequate for Discharge  Goal: Maintain or return to baseline ADL function  Description: INTERVENTIONS:  -  Assess patient's ability to carry out ADLs; assess patient's baseline for ADL function and identify physical deficits which impact ability to perform ADLs (bathing, care of mouth/teeth, toileting, grooming, dressing, etc )  - Assess/evaluate cause of self-care deficits   - Assess range of motion  - Assess patient's mobility; develop plan if impaired  - Assess patient's need for assistive devices and provide as appropriate  - Encourage maximum independence but intervene and supervise when necessary  - Involve family in performance of ADLs  - Assess for home care needs following discharge   - Consider OT consult to assist with ADL evaluation and planning for discharge  - Provide patient education as appropriate  9/26/2021 1058 by Miguel Gold RN  Outcome: Adequate for Discharge  9/26/2021 1058 by Miguel Gold RN  Outcome: Adequate for Discharge  Goal: Maintains/Returns to pre admission functional level  Description: INTERVENTIONS:  - Perform BMAT or MOVE assessment daily    - Set and communicate daily mobility goal to care team and patient/family/caregiver  - Collaborate with rehabilitation services on mobility goals if consulted  - Perform Range of Motion 3 times a day  - Reposition patient every 2 hours    - Dangle patient 3 times a day  - Stand patient 3 times a day  - Ambulate patient 3 times a day  - Out of bed to chair 3 times a day   - Out of bed for meals 3 times a day  - Out of bed for toileting  - Record patient progress and toleration of activity level   9/26/2021 1058 by Miguel Gold RN  Outcome: Adequate for Discharge  9/26/2021 1058 by Miguel Gold RN  Outcome: Adequate for Discharge     Problem: DISCHARGE PLANNING  Goal: Discharge to home or other facility with appropriate resources  Description: INTERVENTIONS:  - Identify barriers to discharge w/patient and caregiver  - Arrange for needed discharge resources and transportation as appropriate  - Identify discharge learning needs (meds, wound care, etc )  - Arrange for interpretive services to assist at discharge as needed  - Refer to Case Management Department for coordinating discharge planning if the patient needs post-hospital services based on physician/advanced practitioner order or complex needs related to functional status, cognitive ability, or social support system  9/26/2021 1058 by Gabriel Sorto RN  Outcome: Adequate for Discharge  9/26/2021 1058 by Gabriel Sorto RN  Outcome: Adequate for Discharge     Problem: Knowledge Deficit  Goal: Patient/family/caregiver demonstrates understanding of disease process, treatment plan, medications, and discharge instructions  Description: Complete learning assessment and assess knowledge base  Interventions:  - Provide teaching at level of understanding  - Provide teaching via preferred learning methods  9/26/2021 1058 by Gabriel Sorto RN  Outcome: Adequate for Discharge  9/26/2021 1058 by Gabriel Sorto RN  Outcome: Adequate for Discharge     Problem: MOBILITY - ADULT  Goal: Maintain or return to baseline ADL function  Description: INTERVENTIONS:  -  Assess patient's ability to carry out ADLs; assess patient's baseline for ADL function and identify physical deficits which impact ability to perform ADLs (bathing, care of mouth/teeth, toileting, grooming, dressing, etc )  - Assess/evaluate cause of self-care deficits   - Assess range of motion  - Assess patient's mobility; develop plan if impaired  - Assess patient's need for assistive devices and provide as appropriate  - Encourage maximum independence but intervene and supervise when necessary  - Involve family in performance of ADLs  - Assess for home care needs following discharge   - Consider OT consult to assist with ADL evaluation and planning for discharge  - Provide patient education as appropriate  9/26/2021 1058 by Gabriel Sorto RN  Outcome: Adequate for Discharge  9/26/2021 1058 by Gabriel Sorto RN  Outcome: Adequate for Discharge  Goal: Maintains/Returns to pre admission functional level  Description: INTERVENTIONS:  - Perform BMAT or MOVE assessment daily    - Set and communicate daily mobility goal to care team and patient/family/caregiver     - Collaborate with rehabilitation services on mobility goals if consulted  - Perform Range of Motion 3 times a day   - Reposition patient every 2 hours    - Dangle patient 3 times a day  - Stand patient 3 times a day  - Ambulate patient 3 times a day  - Out of bed to chair 3 times a day   - Out of bed for meals 3 times a day  - Out of bed for toileting  - Record patient progress and toleration of activity level   9/26/2021 1058 by Jose Ramon Kincaid RN  Outcome: Adequate for Discharge  9/26/2021 1058 by Jose Ramon Kincaid RN  Outcome: Adequate for Discharge

## 2021-09-27 ENCOUNTER — TRANSITIONAL CARE MANAGEMENT (OUTPATIENT)
Dept: FAMILY MEDICINE CLINIC | Facility: CLINIC | Age: 56
End: 2021-09-27

## 2021-09-28 NOTE — UTILIZATION REVIEW
Initial Clinical Review     Admission: Date/Time/Statement:       Admission Orders (From admission, onward)              Ordered          09/22/21 2352   Inpatient Admission  Once                             Orders Placed This Encounter   Procedures    Inpatient Admission       Standing Status:   Standing       Number of Occurrences:   1       Order Specific Question:   Level of Care       Answer:   Med Surg [16]       Order Specific Question:   Estimated length of stay       Answer:   More than 2 Midnights       Order Specific Question:   Certification       Answer:   I certify that inpatient services are medically necessary for this patient for a duration of greater than two midnights  See H&P and MD Progress Notes for additional information about the patient's course of treatment             ED Arrival Information      Expected Arrival Acuity     - 9/22/2021 19:59 Urgent           Means of arrival Escorted by Service Admission type     Walk-In Self Hospitalist Urgent           Arrival complaint     Shortness of Breath               Chief Complaint   Patient presents with    Shortness of Breath       started 9/20, getting worse SOB w moist cough, yellow clear phlegm         Initial Presentation:  50 yo m with a pmh of asthma, VICKY on CPAP, Obesity, Cardiomyopathy, ARF ( baseline Cr 1 1-1 3)  and  DM  He presents to the Ed with SOB, since 9/20/21, he reports this feels similar to his last hospitalization for asthma exacerbation 7/15/21  He has a productive cough for a yellowish mucus  He used his home inhaler which provided minimal relief  He has swollen legs,  And reports he is SOB when lying flat, he has been sleeping in a recliner due to this  He reports some chest tightness with deep inspirations  He reports he took his  40 mg lasix this am, and has had no recent changes in his medications  He is COVID vaccinated and does not have any known COVID exposures   IN the ED he received DuoNeb, heart neb, mag , and IV Solumedrol with some improvement  He is admitted inpatient for suspected asthma exacerbation             Date: 9/23/2021   Day 2: On exam this am he has I & E wheezing, b/l lower extremity edema +2  He reports he has been losing approximately 20 lb over the last couple months  Today he reports some improvement  With his breathing after nebs and IV steroids  He received 40 mg lasix iv yesterday  and his creatinine jumped form 1 6 to 1 8  Hold lasix continue IV hydration and monitor labs   Elevate LE's due to his edema              ED Triage Vitals   Temperature Pulse Respirations Blood Pressure SpO2   09/22/21 2018 09/22/21 2018 09/22/21 2018 09/22/21 2018 09/22/21 2018   98 3 °F (36 8 °C) 99 22 127/77 94 %       Temp Source Heart Rate Source Patient Position - Orthostatic VS BP Location FiO2 (%)   09/22/21 2018 09/22/21 2235 09/22/21 2018 09/22/21 2018 --   Oral Monitor Sitting Right arm         Pain Score           09/23/21 0808           6              Wt Readings from Last 1 Encounters:   09/22/21 (!) 155 kg (341 lb)      Additional Vital Signs:   Date/Time   Temp   Pulse   Resp   BP   SpO2   O2 Device   Patient Position - Orthostatic VS   09/23/21 0808   --   92   20   156/71   94 %   None (Room air)   Sitting   09/23/21 0557   --   96   18   152/67   95 %   None (Room air)   --   09/23/21 0427   --   98   18   157/70   94 %   None (Room air)   --   09/23/21 0322   --   96   18   --   96 %   --   --   09/23/21 0158   --   100   18   159/70   93 %   None (Room air)   --   09/22/21 2352   --   --   --   --   95 %   --   --   09/22/21 2236   --   --   --   --   --   None (Room air)   --   09/22/21 2235   --   101   20   127/77   93 %   None (Room air)   --                  Pertinent Labs/Diagnostic Test Results:        Results from last 7 days   Lab Units 09/22/21  2357   SARS-COV-2   Negative            Results from last 7 days   Lab Units 09/23/21 0423 09/22/21  2257   WBC Thousand/uL 8 02 8 54   HEMOGLOBIN g/dL 11 9* 11 9*   HEMATOCRIT % 36 7 37 0   PLATELETS Thousands/uL 228 256   NEUTROS ABS Thousands/µL  --  5 27                Results from last 7 days   Lab Units 09/23/21  0423 09/22/21  2257   SODIUM mmol/L 134* 139   POTASSIUM mmol/L 5 3 4 9   CHLORIDE mmol/L 104 107   CO2 mmol/L 21 26   ANION GAP mmol/L 9 6   BUN mg/dL 33* 32*   CREATININE mg/dL 1 85* 1 65*   EGFR ml/min/1 73sq m 40 46   CALCIUM mg/dL 8 3 8 4           Results from last 7 days   Lab Units 09/22/21  2257   AST U/L 10   ALT U/L 12   ALK PHOS U/L 94   TOTAL PROTEIN g/dL 6 6   ALBUMIN g/dL 2 8*   TOTAL BILIRUBIN mg/dL 0 34           Results from last 7 days   Lab Units 09/23/21  0612   POC GLUCOSE mg/dl 262*            Results from last 7 days   Lab Units 09/23/21  0423 09/22/21  2257   GLUCOSE RANDOM mg/dL 247* 115              Results from last 7 days   Lab Units 09/22/21  2257   TROPONIN I ng/mL <0 02           Results from last 7 days   Lab Units 09/22/21  2257   NT-PRO BNP pg/mL 235*      CXR 9/23 - no acute cardiopulmonary disease     EKG - 9/22  - NSR rate 99 - large amount of artifact in V4/V5     ED Treatment:              Medication Administration from 09/22/2021 1959 to 09/23/2021 9508        Date/Time Order Dose Route Action Comments       09/22/2021 2245 ipratropium (ATROVENT) 0 02 % inhalation solution 0 5 mg 0 5 mg Nebulization Given         09/22/2021 2245 albuterol inhalation solution 5 mg 5 mg Nebulization Given         09/22/2021 2307 magnesium sulfate 2 g/50 mL IVPB (premix) 2 g 2 g Intravenous New Bag         09/22/2021 2358 methylPREDNISolone sodium succinate (Solu-MEDROL) injection 80 mg 80 mg Intravenous Given         09/22/2021 2352 albuterol inhalation solution 10 mg 10 mg Nebulization Given         09/22/2021 2351 ipratropium (ATROVENT) 0 02 % inhalation solution 1 mg 1 mg Nebulization Given         09/22/2021 2352 sodium chloride 0 9 % inhalation solution 12 mL 12 mL Nebulization Given         09/23/2021 0814 furosemide (LASIX) tablet 40 mg 40 mg Oral Given         09/23/2021 0813 multivitamin-minerals (CENTRUM) tablet 1 tablet 1 tablet Oral Given         09/23/2021 0818 methylPREDNISolone sodium succinate (Solu-MEDROL) injection 40 mg 40 mg Intravenous Given         09/23/2021 0815 levalbuterol (XOPENEX) inhalation solution 1 25 mg 1 25 mg Nebulization Given         09/23/2021 0815 sodium chloride 0 9 % inhalation solution 3 mL 3 mL Nebulization Given         09/23/2021 0812 acetaminophen (TYLENOL) tablet 650 mg 650 mg Oral Given         09/23/2021 0157 furosemide (LASIX) injection 40 mg 40 mg Intravenous Given         09/23/2021 0815 budesonide (PULMICORT) inhalation solution 0 5 mg 0 5 mg Nebulization Given         09/23/2021 0554 heparin (porcine) subcutaneous injection 7,500 Units 7,500 Units Subcutaneous Given            Medical History        Past Medical History:   Diagnosis Date    Acute gastritis without hemorrhage       last assessed 3/24/17    Asthma      Diabetes mellitus ([facility] )      Gastric bypass status for obesity      Hyperlipidemia      Hypertension      Impaired fasting glucose       last assessed 5/10/17    Obesity      Viral gastroenteritis       last assessed 11/4/16         Present on Admission:   Essential hypertension   Type 2 diabetes mellitus with hyperglycemia (HCC)   Moderate persistent asthma with acute exacerbation   Cardiomyopathy ([facility] )   VICKY (obstructive sleep apnea)   Morbid obesity (HCC)   Acute renal failure (ARF) ([facility] )        Admitting Diagnosis: Shortness of breath [R06 02]  Age/Sex: 49 y o  male            Admission Orders:  Scheduled Medications:  atorvastatin, 10 mg, Oral, Daily With Dinner  budesonide, 0 5 mg, Nebulization, Q12H  furosemide, 40 mg, Oral, Daily  heparin (porcine), 7,500 Units, Subcutaneous, Q8H [address]  levalbuterol, 1 25 mg, Nebulization, TID   And  sodium chloride, 3 mL, Nebulization, TID  methylPREDNISolone sodium succinate, 40 mg, Intravenous, Q8H [facility]  montelukast, 10 mg, Oral, HS  multivitamin-minerals, 1 tablet, Oral, Daily        Continuous IV Infusions:  sodium chloride, 75 mL/hr, Intravenous, Continuous - d/c'd 9/23 @ 19:49        PRN Meds:  acetaminophen, 650 mg, Oral, Q6H PRN - 9/23 x 1   albuterol, 2 puff, Inhalation, Q4H PRN  levalbuterol, 1 25 mg, Nebulization, Q6H PRN   And  sodium chloride, 3 mL, Nebulization, Q6H PRN  ondansetron, 4 mg, Intravenous, Q6H PRN        Nursing Orders -  VS - up & OOB as tolerated - SCD's to le's -  Diet cons carb - fluid restriction 1800 ml - CPAP hs         Network Utilization Review Department  ATTENTION: Please call with any questions or concerns to [phone] and carefully listen to the prompts so that you are directed to the right person  All voicemails are confidential   Michell French all requests for admission clinical reviews, approved or denied determinations and any other requests to dedicated fax number below belonging to the campus where the patient is receiving treatment   List of dedicated fax numbers for the Facilities:  [address] DENIALS (Administrative/Medical Necessity) [phone]   [address] (Maternity/NICU/Pediatrics) [phone]   [address] [place] [address] [address] [place] [address] [address] Xavier Katz [address] [place] [address] [phone]

## 2021-09-28 NOTE — UTILIZATION REVIEW
Notification of Discharge   This is a Notification of Discharge from our facility [address]  Please be advised that this patient has been discharge from our facility  Below you will find the admission and discharge date and time including the patients disposition  UTILIZATION REVIEW CONTACT:  Bibi Bishop  Utilization   Network Utilization Review Department  Phone: [phone] x carefully listen to the prompts  All voicemails are confidential   Email: [email or web address]     PHYSICIAN ADVISORY SERVICES:  FOR [id number] REVIEW - MEDICAL NECESSITY DENIAL  Phone: [phone]  Fax: [phone]  Email: [email or web address]     PRESENTATION DATE: 9/22/2021 10:07 PM  OBERVATION ADMISSION DATE:  INPATIENT ADMISSION DATE: 9/22/21 11:52 PM   DISCHARGE DATE: 9/26/2021 12:22 PM  DISPOSITION: Home/Self Care Home/Self Care      IMPORTANT INFORMATION:  Send all requests for admission clinical reviews, approved or denied determinations and any other requests to dedicated fax number below belonging to the campus where the patient is receiving treatment   List of dedicated fax numbers:  [address] DENIALS (Administrative/Medical Necessity) [phone]   [address] [place] [address] (Maternity/NICU/Pediatrics) [phone]   [facility] [phone]   [facility] [phone]   [facility] [phone]   [address] [phone]   [facility]  [phone]   [address] [place] [address], [address]  2401 [facility] 1000 [address] [facility] [phone]

## 2021-09-29 ENCOUNTER — OFFICE VISIT (OUTPATIENT)
Dept: FAMILY MEDICINE CLINIC | Facility: CLINIC | Age: 56
End: 2021-09-29
Payer: COMMERCIAL

## 2021-09-29 VITALS
HEART RATE: 90 BPM | RESPIRATION RATE: 16 BRPM | SYSTOLIC BLOOD PRESSURE: 124 MMHG | WEIGHT: 315 LBS | BODY MASS INDEX: 41.75 KG/M2 | DIASTOLIC BLOOD PRESSURE: 70 MMHG | OXYGEN SATURATION: 98 % | TEMPERATURE: 97.5 F | HEIGHT: 73 IN

## 2021-09-29 DIAGNOSIS — E66.01 MORBID OBESITY (HCC): ICD-10-CM

## 2021-09-29 DIAGNOSIS — I42.9 CARDIOMYOPATHY, UNSPECIFIED TYPE (HCC): ICD-10-CM

## 2021-09-29 DIAGNOSIS — E11.65 TYPE 2 DIABETES MELLITUS WITH HYPERGLYCEMIA, WITHOUT LONG-TERM CURRENT USE OF INSULIN (HCC): ICD-10-CM

## 2021-09-29 DIAGNOSIS — I10 ESSENTIAL HYPERTENSION: ICD-10-CM

## 2021-09-29 DIAGNOSIS — N17.9 ACUTE RENAL FAILURE, UNSPECIFIED ACUTE RENAL FAILURE TYPE (HCC): ICD-10-CM

## 2021-09-29 DIAGNOSIS — E78.2 MIXED HYPERLIPIDEMIA: ICD-10-CM

## 2021-09-29 DIAGNOSIS — J45.41 MODERATE PERSISTENT ASTHMA WITH ACUTE EXACERBATION: Primary | ICD-10-CM

## 2021-09-29 DIAGNOSIS — R60.0 BILATERAL LEG EDEMA: ICD-10-CM

## 2021-09-29 DIAGNOSIS — G47.33 OSA (OBSTRUCTIVE SLEEP APNEA): ICD-10-CM

## 2021-09-29 PROCEDURE — 1111F DSCHRG MED/CURRENT MED MERGE: CPT | Performed by: FAMILY MEDICINE

## 2021-09-29 PROCEDURE — 99496 TRANSJ CARE MGMT HIGH F2F 7D: CPT | Performed by: FAMILY MEDICINE

## 2021-09-29 RX ORDER — BLOOD SUGAR DIAGNOSTIC
STRIP MISCELLANEOUS
Qty: 200 STRIP | Refills: 3 | Status: SHIPPED | OUTPATIENT
Start: 2021-09-29 | End: 2022-08-03

## 2021-09-29 RX ORDER — FUROSEMIDE 40 MG/1
40 TABLET ORAL DAILY
Qty: 30 TABLET | Refills: 5 | Status: SHIPPED | OUTPATIENT
Start: 2021-09-29 | End: 2021-12-16

## 2021-09-29 NOTE — PROGRESS NOTES
Chief Complaint   Patient presents with    Transition of Care Management     9/22-9/26/21 Moderate persistent asthma with acute exacerbation     Health Maintenance   Topic Date Due    Pneumococcal Vaccine: Pediatrics (0 to 5 Years) and At-Risk Patients (6 to 59 Years) (1 of 2 - PPSV23) Never done    HIV Screening  Never done    Colorectal Cancer Screening  Never done    URINE MICROALBUMIN  04/23/2020    Influenza Vaccine (1) 09/01/2021    HEMOGLOBIN A1C  01/18/2022    Depression Screening  05/14/2022    BMI: Followup Plan  05/14/2022    Annual Physical  05/14/2022    Diabetic Foot Exam  08/30/2022    BMI: Adult  09/29/2022    DM Eye Exam  11/04/2022    DTaP,Tdap,and Td Vaccines (2 - Td or Tdap) 06/09/2028    Hepatitis C Screening  Completed    COVID-19 Vaccine  Completed    HIB Vaccine  Aged Out    Hepatitis B Vaccine  Aged Out    IPV Vaccine  Aged Out    Hepatitis A Vaccine  Aged Out    Meningococcal ACWY Vaccine  Aged Out    HPV Vaccine  Aged Out                   Assessment/Plan:     Patient presents for TCM visit  He was admitted to [address] [place] [address] 9/22/21- 9/26/21 for moderate persistent asthma exacerbation  Moderate persistent asthma with acute exacerbation  Patient reports improvement in symptoms  Complete Prednisone taper  Continue Symbicort 160/4 5 mcg 2 puffs twice daily, Albuterol 2 5 mg via nebulizer every 6 hours as needed for chest tightness or wheezing  Schedule follow-up office visit with pulmonology  Essential hypertension  BP remains stable off Lisinopril  Monitor BP at home  Cardiomyopathy ([facility] )  Echocardiogram done in July 2021 showed EF 50% with akinesis of basal inferior and basal anterolateral walls, grade 1 diastolic dysfunction  Continue Lasix 40 mg daily  Follow-up with cardiology Dr Satish Agarwal on October 11, 2021  Acute renal failure (ARF) ([facility] )  Patient presented to [facility] [place] with acute renal failure  ACE was put on hold  Recommended to stay hydrated, avoid nephr toxins  Recheck BMP this week on Friday  Morbid obesity ([facility] )  Patient has H/o gastric bypass surgery  Recommended to work on dietary and lifestyle modifications  Encouraged weight reduction  Mixed hyperlipidemia  Continue Atorvastatin 10 mg daily  Type 2 diabetes mellitus with hyperglycemia (HCC)    Lab Results   Component Value Date    HGBA1C 7 1 (H) 07/18/2021     Patient reports elevated blood sugar levels due to oral steroid therapy for asthma exacerbation  Continue Metformin, Januvia  Follow a low carb diet, avoid starchy foods, lose weight  Monitor blood sugar at home and bring blood sugar log for next visit  Bilateral leg edema  Patient reports improvement in leg edema  Continue Lasix 40 mg daily  VICKY (obstructive sleep apnea)  Continue to use CPAP at night  HM: declined Flu vaccination  Recommended Pneumovax vaccination  Patient with discuss with pulmonologist next month  Schedule follow-up visit in 6 weeks  Diagnoses and all orders for this visit:    Moderate persistent asthma with acute exacerbation    Essential hypertension    Cardiomyopathy, unspecified type (HCC)  -     furosemide (LASIX) 40 mg tablet; Take 1 tablet (40 mg total) by mouth daily    Acute renal failure, unspecified acute renal failure type ([facility] )    Morbid obesity ([facility] )    Type 2 diabetes mellitus with hyperglycemia, without long-term current use of insulin (HCC)  -     glucose blood (Accu-Chek Guide) test strip; Test blood sugar twice daily    Mixed hyperlipidemia    Bilateral leg edema  -     furosemide (LASIX) 40 mg tablet; Take 1 tablet (40 mg total) by mouth daily    VICKY (obstructive sleep apnea)    Other orders  -     Cancel: PNEUMOCOCCAL POLYSACCHARIDE VACCINE 23-VALENT =>1YO SQ IM         Subjective:     Patient ID: Joy Macario is a 49 y o  male  HPI      Patient presents for TCM visit      He was [facility] 9/22/21- 9/26/21 for moderate persistent asthma exacerbation  Reviewed hospital records, test results, discharge medications  Patient presented to the hospital due to shortness of breath, wheezing, productive cough  Chest x-ray showed no acute cardiopulmonary disease  Patient was started on IV steroids, nebulizer treatments with improvement in symptoms  He was also noted to have orthopnea and worsening lower extremity edema, was given several dosages of intravenous diuretic for heart failure exacerbation  Symptoms improved  Patient had ANTONIO on admission  ACE- inhibitor was held on discharge  Patient  was recommended to repeat BMP in 1 week, follow up with PCP, cardiology, schedule follow-up visit with pulmonology  Blood test done on September 26, 2021 showed potassium 4 4, creatinine 1 75, GFR 43, glucose 224  Asthma - patient was discharged home on Prednisone taper  Reports improvement in cough, wheezing, shortness of breath  No fever  Denies chest pain, dizziness  He uses Symbicort 160/4 5 mcg 2 puffs twice daily, Albuterol 2 5 mg via nebulizer twice daily  Cardiomyopathy - patient takes Lasix 40 mg daily  Reports improvement in SOB, leg edema  HTN - blood pressure remained stable off Lisinopril 30 mg daily  VICKY - using CPAP  Type 2 DM - blood sugar is elevated due to oral steroid therapy  Patient takes Metformin 500 mg 1 tablet twice daily, Januvia 100 mg daily  Hyperlipidemia - on Atorvastatin 10 mg daily  Morbid obesity - gained 12 lb since last visit in August     Completed COVID vaccination in June  Declined Flu vaccination  Review of Systems   Constitutional: Negative for activity change, appetite change, chills, fatigue and fever  HENT: Negative for congestion, ear pain, nosebleeds, sore throat, tinnitus and trouble swallowing  Eyes: Negative      Respiratory: Positive for cough (improved), shortness of breath (improved) and wheezing (occasional)  Negative for chest tightness  Cardiovascular: Positive for chest pain and leg swelling (improved)  Negative for palpitations  Gastrointestinal: Positive for diarrhea (intermittent)  Negative for abdominal pain, blood in stool, constipation, nausea and vomiting  Genitourinary: Negative for difficulty urinating, dysuria, flank pain, frequency and hematuria  Musculoskeletal: Positive for arthralgias (knees) and back pain (chronic)  Negative for gait problem, joint swelling and neck pain  Skin: Positive for rash (psoriatic patch on right knee)  Neurological: Negative for dizziness, syncope and headaches  Hematological: Negative  Psychiatric/Behavioral: Negative for dysphoric mood and sleep disturbance  The patient is not nervous/anxious  Objective:     Physical Exam  Vitals and nursing note reviewed  Constitutional:       Appearance: Normal appearance  Comments: Morbidly obese   HENT:      Head: Normocephalic and atraumatic  Nose: No congestion  Eyes:      Conjunctiva/sclera: Conjunctivae normal       Pupils: Pupils are equal, round, and reactive to light  Cardiovascular:      Rate and Rhythm: Regular rhythm  Tachycardia present  Heart sounds: No murmur heard  Comments: Trace BL LE edema  Pulmonary:      Effort: Pulmonary effort is normal       Breath sounds: Normal breath sounds  No wheezing  Abdominal:      General: There is no distension  Palpations: Abdomen is soft  Tenderness: There is no abdominal tenderness  Musculoskeletal:         General: No swelling or tenderness  Normal range of motion  Cervical back: Normal range of motion and neck supple  Skin:     General: Skin is warm and dry  Comments: Psoriatic patch on right knee   Neurological:      General: No focal deficit present  Mental Status: He is alert  Motor: No weakness        Gait: Gait normal    Psychiatric:         Mood and Affect: Mood normal            Vitals:    09/29/21 0947 09/29/21 1026   BP: 130/78 124/70   BP Location: Left arm Left arm   Patient Position: Sitting Sitting   Cuff Size: Large Large   Pulse: (!) 106 90   Resp: 16    Temp: 97 5 °F (36 4 °C)    TempSrc: Tympanic    SpO2: 98%    Weight: (!) 163 kg (359 lb)    Height: 6' 1" (1 854 m)        Transitional Care Management Review:  Rigoberto Wiggins is a 49 y o  male here for TCM follow up  During the TCM phone call patient stated:    TCM Call (since 8/29/2021)     Date and time call was made  9/27/2021  9:15 AM    Hospital care reviewed  Records reviewed    Patient was hospitialized at  [facility]        Date of Admission  09/22/21    Date of discharge  09/26/21    Diagnosis  Moderate persistent asthma with acute exacerbation    Disposition  Home    Were the patients medications reviewed and updated  Yes    Current Symptoms  Shortness of breath    Shortness of breath severity  Moderate      TCM Call (since 8/29/2021)     Post hospital issues  None    Should patient be enrolled in anticoag monitoring? No    Scheduled for follow up?   Yes    Patients specialists  Cardiologist; Pulmonlolgist    Did you obtain your prescribed medications  Yes    Do you need help managing your prescriptions or medications  No    Is transportation to your appointment needed  No    I have advised the patient to call PCP with any new or worsening symptoms  Bryan Carroll members    Support System  Family    The type of support provided  Physical; Emotional    Do you have social support  Yes, as much as I need    Are you recieving any outpatient services  No    Are you recieving home care services  No    Are you using any community resources  No    Have you fallen in the last 12 months  No    Interperter language line needed  No    Counseling  Patient          Margy Phipps MD

## 2021-09-30 NOTE — ASSESSMENT & PLAN NOTE
Echocardiogram done in July 2021 showed EF 50% with akinesis of basal inferior and basal anterolateral walls, grade 1 diastolic dysfunction  Continue Lasix 40 mg daily  Follow-up with cardiology Dr Corrine Acuna on October 11, 2021

## 2021-09-30 NOTE — ASSESSMENT & PLAN NOTE
Patient has H/o gastric bypass surgery  Recommended to work on dietary and lifestyle modifications  Encouraged weight reduction

## 2021-09-30 NOTE — ASSESSMENT & PLAN NOTE
Lab Results   Component Value Date    HGBA1C 7 1 (H) 07/18/2021     Patient reports elevated blood sugar levels due to oral steroid therapy for asthma exacerbation  Continue Metformin, Januvia  Follow a low carb diet, avoid starchy foods, lose weight  Monitor blood sugar at home and bring blood sugar log for next visit

## 2021-09-30 NOTE — ASSESSMENT & PLAN NOTE
Patient reports improvement in symptoms  Complete Prednisone taper  Continue Symbicort 160/4 5 mcg 2 puffs twice daily, Albuterol 2 5 mg via nebulizer every 6 hours as needed for chest tightness or wheezing  Schedule follow-up office visit with pulmonology

## 2021-09-30 NOTE — ASSESSMENT & PLAN NOTE
Patient presented to [facility] with acute renal failure  ACE was put on hold  Recommended to stay hydrated, avoid nephr toxins  Recheck BMP this week on Friday

## 2021-10-01 ENCOUNTER — APPOINTMENT (OUTPATIENT)
Dept: LAB | Facility: HOSPITAL | Age: 56
End: 2021-10-01
Payer: COMMERCIAL

## 2021-10-01 DIAGNOSIS — I10 ESSENTIAL HYPERTENSION: ICD-10-CM

## 2021-10-01 DIAGNOSIS — E78.2 MIXED HYPERLIPIDEMIA: ICD-10-CM

## 2021-10-01 DIAGNOSIS — N17.9 ACUTE RENAL FAILURE, UNSPECIFIED ACUTE RENAL FAILURE TYPE (HCC): ICD-10-CM

## 2021-10-01 DIAGNOSIS — E87.1 HYPONATREMIA: ICD-10-CM

## 2021-10-01 DIAGNOSIS — Z12.5 SCREENING FOR PROSTATE CANCER: ICD-10-CM

## 2021-10-01 LAB
ANION GAP SERPL CALCULATED.3IONS-SCNC: 1 MMOL/L (ref 4–13)
BUN SERPL-MCNC: 22 MG/DL (ref 5–25)
CALCIUM SERPL-MCNC: 8.3 MG/DL (ref 8.3–10.1)
CHLORIDE SERPL-SCNC: 106 MMOL/L (ref 100–108)
CHOLEST SERPL-MCNC: 157 MG/DL (ref 50–200)
CO2 SERPL-SCNC: 32 MMOL/L (ref 21–32)
CREAT SERPL-MCNC: 1.16 MG/DL (ref 0.6–1.3)
GFR SERPL CREATININE-BSD FRML MDRD: 71 ML/MIN/1.73SQ M
GLUCOSE P FAST SERPL-MCNC: 166 MG/DL (ref 65–99)
HDLC SERPL-MCNC: 66 MG/DL
LDLC SERPL CALC-MCNC: 73 MG/DL (ref 0–100)
NONHDLC SERPL-MCNC: 91 MG/DL
POTASSIUM SERPL-SCNC: 4.6 MMOL/L (ref 3.5–5.3)
PSA SERPL-MCNC: 0.6 NG/ML (ref 0–4)
SODIUM SERPL-SCNC: 139 MMOL/L (ref 136–145)
TRIGL SERPL-MCNC: 90 MG/DL

## 2021-10-01 PROCEDURE — 3066F NEPHROPATHY DOC TX: CPT | Performed by: INTERNAL MEDICINE

## 2021-10-01 PROCEDURE — 80048 BASIC METABOLIC PNL TOTAL CA: CPT

## 2021-10-01 PROCEDURE — G0103 PSA SCREENING: HCPCS

## 2021-10-01 PROCEDURE — 80061 LIPID PANEL: CPT

## 2021-10-01 PROCEDURE — 36415 COLL VENOUS BLD VENIPUNCTURE: CPT

## 2021-10-04 ENCOUNTER — TELEPHONE (OUTPATIENT)
Dept: FAMILY MEDICINE CLINIC | Facility: CLINIC | Age: 56
End: 2021-10-04

## 2021-10-11 ENCOUNTER — OFFICE VISIT (OUTPATIENT)
Dept: PULMONOLOGY | Facility: CLINIC | Age: 56
End: 2021-10-11
Payer: COMMERCIAL

## 2021-10-11 ENCOUNTER — OFFICE VISIT (OUTPATIENT)
Dept: CARDIOLOGY CLINIC | Facility: CLINIC | Age: 56
End: 2021-10-11
Payer: COMMERCIAL

## 2021-10-11 VITALS
DIASTOLIC BLOOD PRESSURE: 92 MMHG | BODY MASS INDEX: 41.75 KG/M2 | TEMPERATURE: 98.8 F | OXYGEN SATURATION: 97 % | WEIGHT: 315 LBS | SYSTOLIC BLOOD PRESSURE: 148 MMHG | HEART RATE: 105 BPM | HEIGHT: 73 IN

## 2021-10-11 VITALS
TEMPERATURE: 97.7 F | OXYGEN SATURATION: 99 % | WEIGHT: 315 LBS | HEIGHT: 73 IN | HEART RATE: 95 BPM | BODY MASS INDEX: 41.75 KG/M2 | DIASTOLIC BLOOD PRESSURE: 74 MMHG | SYSTOLIC BLOOD PRESSURE: 124 MMHG

## 2021-10-11 DIAGNOSIS — R06.00 DYSPNEA ON EXERTION: ICD-10-CM

## 2021-10-11 DIAGNOSIS — J45.41 MODERATE PERSISTENT ASTHMA WITH ACUTE EXACERBATION: Primary | ICD-10-CM

## 2021-10-11 DIAGNOSIS — I10 ESSENTIAL HYPERTENSION: ICD-10-CM

## 2021-10-11 DIAGNOSIS — I42.0 DILATED CARDIOMYOPATHY (HCC): Primary | ICD-10-CM

## 2021-10-11 DIAGNOSIS — E78.2 MIXED HYPERLIPIDEMIA: ICD-10-CM

## 2021-10-11 DIAGNOSIS — I42.9 CARDIOMYOPATHY, UNSPECIFIED TYPE (HCC): ICD-10-CM

## 2021-10-11 DIAGNOSIS — J45.901 ASTHMA EXACERBATION: ICD-10-CM

## 2021-10-11 DIAGNOSIS — T78.40XD ALLERGY, SUBSEQUENT ENCOUNTER: ICD-10-CM

## 2021-10-11 DIAGNOSIS — E66.01 MORBID OBESITY (HCC): ICD-10-CM

## 2021-10-11 DIAGNOSIS — G47.33 OSA (OBSTRUCTIVE SLEEP APNEA): ICD-10-CM

## 2021-10-11 PROBLEM — R06.09 DYSPNEA ON EXERTION: Status: ACTIVE | Noted: 2021-10-11

## 2021-10-11 PROBLEM — J45.909 MODERATE ASTHMA WITHOUT COMPLICATION: Status: ACTIVE | Noted: 2021-10-11

## 2021-10-11 PROCEDURE — 3074F SYST BP LT 130 MM HG: CPT | Performed by: INTERNAL MEDICINE

## 2021-10-11 PROCEDURE — 99214 OFFICE O/P EST MOD 30 MIN: CPT | Performed by: INTERNAL MEDICINE

## 2021-10-11 PROCEDURE — 1111F DSCHRG MED/CURRENT MED MERGE: CPT | Performed by: INTERNAL MEDICINE

## 2021-10-11 PROCEDURE — 3008F BODY MASS INDEX DOCD: CPT | Performed by: INTERNAL MEDICINE

## 2021-10-11 PROCEDURE — 3078F DIAST BP <80 MM HG: CPT | Performed by: INTERNAL MEDICINE

## 2021-10-13 ENCOUNTER — TELEPHONE (OUTPATIENT)
Dept: NON INVASIVE DIAGNOSTICS | Facility: CLINIC | Age: 56
End: 2021-10-13

## 2021-10-14 ENCOUNTER — TELEPHONE (OUTPATIENT)
Dept: CARDIOLOGY CLINIC | Facility: CLINIC | Age: 56
End: 2021-10-14

## 2021-10-15 ENCOUNTER — HOSPITAL ENCOUNTER (OUTPATIENT)
Dept: NON INVASIVE DIAGNOSTICS | Facility: CLINIC | Age: 56
Discharge: HOME | End: 2021-10-15

## 2021-10-15 DIAGNOSIS — R06.00 DYSPNEA ON EXERTION: ICD-10-CM

## 2021-10-15 DIAGNOSIS — I42.0 DILATED CARDIOMYOPATHY (HCC): ICD-10-CM

## 2021-10-22 ENCOUNTER — HOSPITAL ENCOUNTER (OUTPATIENT)
Dept: PULMONOLOGY | Facility: HOSPITAL | Age: 56
Discharge: HOME | End: 2021-10-22
Attending: INTERNAL MEDICINE
Payer: COMMERCIAL

## 2021-10-22 DIAGNOSIS — J45.20 MILD INTERMITTENT ASTHMA WITHOUT COMPLICATION: ICD-10-CM

## 2021-10-22 PROCEDURE — 94729 DIFFUSING CAPACITY: CPT

## 2021-10-22 PROCEDURE — 94060 EVALUATION OF WHEEZING: CPT

## 2021-10-22 PROCEDURE — 94729 DIFFUSING CAPACITY: CPT | Performed by: INTERNAL MEDICINE

## 2021-10-22 PROCEDURE — 94726 PLETHYSMOGRAPHY LUNG VOLUMES: CPT | Performed by: INTERNAL MEDICINE

## 2021-10-22 PROCEDURE — 94726 PLETHYSMOGRAPHY LUNG VOLUMES: CPT

## 2021-10-22 PROCEDURE — 94060 EVALUATION OF WHEEZING: CPT | Performed by: INTERNAL MEDICINE

## 2021-10-22 PROCEDURE — 94010 BREATHING CAPACITY TEST: CPT

## 2021-10-22 RX ORDER — ALBUTEROL SULFATE 2.5 MG/3ML
2.5 SOLUTION RESPIRATORY (INHALATION) ONCE
Status: COMPLETED | OUTPATIENT
Start: 2021-10-22 | End: 2021-10-22

## 2021-10-22 RX ADMIN — ALBUTEROL SULFATE 2.5 MG: 2.5 SOLUTION RESPIRATORY (INHALATION) at 07:43

## 2021-11-01 DIAGNOSIS — J45.20 MILD INTERMITTENT ASTHMA WITHOUT COMPLICATION: Primary | ICD-10-CM

## 2021-11-01 RX ORDER — LEVALBUTEROL INHALATION SOLUTION 1.25 MG/3ML
1.25 SOLUTION RESPIRATORY (INHALATION) 3 TIMES DAILY
Qty: 270 ML | Refills: 3 | Status: SHIPPED | OUTPATIENT
Start: 2021-11-01

## 2021-11-06 ENCOUNTER — TELEPHONE (OUTPATIENT)
Dept: FAMILY MEDICINE CLINIC | Facility: CLINIC | Age: 56
End: 2021-11-06

## 2021-11-06 DIAGNOSIS — J45.41 MODERATE PERSISTENT ASTHMA WITH ACUTE EXACERBATION: ICD-10-CM

## 2021-11-06 RX ORDER — SODIUM CHLORIDE FOR INHALATION 0.9 %
3 VIAL, NEBULIZER (ML) INHALATION
Qty: 270 ML | Refills: 0 | Status: SHIPPED | OUTPATIENT
Start: 2021-11-06 | End: 2021-12-06

## 2021-11-07 PROBLEM — J45.41 MODERATE PERSISTENT ASTHMA WITH ACUTE EXACERBATION: Status: RESOLVED | Noted: 2021-07-18 | Resolved: 2021-11-07

## 2021-11-09 ENCOUNTER — APPOINTMENT (EMERGENCY)
Dept: RADIOLOGY | Facility: HOSPITAL | Age: 56
DRG: 189 | End: 2021-11-09
Payer: COMMERCIAL

## 2021-11-09 ENCOUNTER — HOSPITAL ENCOUNTER (INPATIENT)
Facility: HOSPITAL | Age: 56
LOS: 6 days | Discharge: HOME | DRG: 189 | End: 2021-11-15
Attending: EMERGENCY MEDICINE | Admitting: INTERNAL MEDICINE
Payer: COMMERCIAL

## 2021-11-09 DIAGNOSIS — I50.9 CHF (CONGESTIVE HEART FAILURE) (HCC): ICD-10-CM

## 2021-11-09 DIAGNOSIS — R06.00 DYSPNEA: ICD-10-CM

## 2021-11-09 DIAGNOSIS — J45.901 ASTHMA EXACERBATION: Primary | ICD-10-CM

## 2021-11-09 DIAGNOSIS — N17.9 AKI (ACUTE KIDNEY INJURY) (HCC): ICD-10-CM

## 2021-11-09 LAB
ALBUMIN SERPL BCP-MCNC: 3 G/DL (ref 3.5–5)
ALP SERPL-CCNC: 97 U/L (ref 46–116)
ALT SERPL W P-5'-P-CCNC: 12 U/L (ref 12–78)
ANION GAP SERPL CALCULATED.3IONS-SCNC: 13 MMOL/L (ref 4–13)
AST SERPL W P-5'-P-CCNC: 11 U/L (ref 5–45)
ATRIAL RATE: 90 BPM
BASOPHILS # BLD AUTO: 0.07 THOUSANDS/ΜL (ref 0–0.1)
BASOPHILS NFR BLD AUTO: 1 % (ref 0–1)
BILIRUB SERPL-MCNC: 0.54 MG/DL (ref 0.2–1)
BUN SERPL-MCNC: 14 MG/DL (ref 5–25)
CALCIUM ALBUM COR SERPL-MCNC: 8.9 MG/DL (ref 8.3–10.1)
CALCIUM SERPL-MCNC: 8.1 MG/DL (ref 8.3–10.1)
CARDIAC TROPONIN I PNL SERPL HS: 18 NG/L
CHLORIDE SERPL-SCNC: 102 MMOL/L (ref 100–108)
CO2 SERPL-SCNC: 27 MMOL/L (ref 21–32)
CREAT SERPL-MCNC: 1.51 MG/DL (ref 0.6–1.3)
EOSINOPHIL # BLD AUTO: 1.11 THOUSAND/ΜL (ref 0–0.61)
EOSINOPHIL NFR BLD AUTO: 12 % (ref 0–6)
ERYTHROCYTE [DISTWIDTH] IN BLOOD BY AUTOMATED COUNT: 12.7 % (ref 11.6–15.1)
GFR SERPL CREATININE-BSD FRML MDRD: 52 ML/MIN/1.73SQ M
GLUCOSE SERPL-MCNC: 169 MG/DL (ref 65–140)
HCT VFR BLD AUTO: 36.2 % (ref 36.5–49.3)
HGB BLD-MCNC: 11.8 G/DL (ref 12–17)
IMM GRANULOCYTES # BLD AUTO: 0.1 THOUSAND/UL (ref 0–0.2)
IMM GRANULOCYTES NFR BLD AUTO: 1 % (ref 0–2)
LYMPHOCYTES # BLD AUTO: 1.17 THOUSANDS/ΜL (ref 0.6–4.47)
LYMPHOCYTES NFR BLD AUTO: 13 % (ref 14–44)
MCH RBC QN AUTO: 31 PG (ref 26.8–34.3)
MCHC RBC AUTO-ENTMCNC: 32.6 G/DL (ref 31.4–37.4)
MCV RBC AUTO: 95 FL (ref 82–98)
MONOCYTES # BLD AUTO: 0.52 THOUSAND/ΜL (ref 0.17–1.22)
MONOCYTES NFR BLD AUTO: 6 % (ref 4–12)
NEUTROPHILS # BLD AUTO: 6.03 THOUSANDS/ΜL (ref 1.85–7.62)
NEUTS SEG NFR BLD AUTO: 67 % (ref 43–75)
NRBC BLD AUTO-RTO: 0 /100 WBCS
NT-PROBNP SERPL-MCNC: 619 PG/ML
P AXIS: 51 DEGREES
PLATELET # BLD AUTO: 234 THOUSANDS/UL (ref 149–390)
PMV BLD AUTO: 9.6 FL (ref 8.9–12.7)
POTASSIUM SERPL-SCNC: 3.3 MMOL/L (ref 3.5–5.3)
PR INTERVAL: 116 MS
PROT SERPL-MCNC: 6.8 G/DL (ref 6.4–8.2)
QRS AXIS: 70 DEGREES
QRSD INTERVAL: 74 MS
QT INTERVAL: 382 MS
QTC INTERVAL: 469 MS
RBC # BLD AUTO: 3.81 MILLION/UL (ref 3.88–5.62)
SODIUM SERPL-SCNC: 142 MMOL/L (ref 136–145)
T WAVE AXIS: 82 DEGREES
VENTRICULAR RATE: 91 BPM
WBC # BLD AUTO: 9 THOUSAND/UL (ref 4.31–10.16)

## 2021-11-09 PROCEDURE — 94644 CONT INHLJ TX 1ST HOUR: CPT

## 2021-11-09 PROCEDURE — 93010 ELECTROCARDIOGRAM REPORT: CPT | Performed by: INTERNAL MEDICINE

## 2021-11-09 PROCEDURE — 71045 X-RAY EXAM CHEST 1 VIEW: CPT

## 2021-11-09 PROCEDURE — 84484 ASSAY OF TROPONIN QUANT: CPT

## 2021-11-09 PROCEDURE — 99285 EMERGENCY DEPT VISIT HI MDM: CPT

## 2021-11-09 PROCEDURE — 83880 ASSAY OF NATRIURETIC PEPTIDE: CPT

## 2021-11-09 PROCEDURE — 96365 THER/PROPH/DIAG IV INF INIT: CPT

## 2021-11-09 PROCEDURE — 99285 EMERGENCY DEPT VISIT HI MDM: CPT | Performed by: EMERGENCY MEDICINE

## 2021-11-09 PROCEDURE — 36415 COLL VENOUS BLD VENIPUNCTURE: CPT

## 2021-11-09 PROCEDURE — 85025 COMPLETE CBC W/AUTO DIFF WBC: CPT

## 2021-11-09 PROCEDURE — 94760 N-INVAS EAR/PLS OXIMETRY 1: CPT

## 2021-11-09 PROCEDURE — 80053 COMPREHEN METABOLIC PANEL: CPT

## 2021-11-09 PROCEDURE — 3066F NEPHROPATHY DOC TX: CPT | Performed by: INTERNAL MEDICINE

## 2021-11-09 PROCEDURE — 93005 ELECTROCARDIOGRAM TRACING: CPT

## 2021-11-09 RX ORDER — POTASSIUM CHLORIDE 20 MEQ/1
40 TABLET, EXTENDED RELEASE ORAL ONCE
Status: COMPLETED | OUTPATIENT
Start: 2021-11-09 | End: 2021-11-09

## 2021-11-09 RX ORDER — METHYLPREDNISOLONE SOD SUCC 125 MG
1 VIAL (EA) INJECTION ONCE
Status: COMPLETED | OUTPATIENT
Start: 2021-11-09 | End: 2021-11-09

## 2021-11-09 RX ORDER — TERBUTALINE SULFATE 1 MG/ML
1 INJECTION, SOLUTION SUBCUTANEOUS ONCE
Status: COMPLETED | OUTPATIENT
Start: 2021-11-09 | End: 2021-11-09

## 2021-11-09 RX ORDER — MAGNESIUM SULFATE HEPTAHYDRATE 40 MG/ML
2 INJECTION, SOLUTION INTRAVENOUS ONCE
Status: COMPLETED | OUTPATIENT
Start: 2021-11-09 | End: 2021-11-10

## 2021-11-09 RX ORDER — SODIUM CHLORIDE FOR INHALATION 0.9 %
3 VIAL, NEBULIZER (ML) INHALATION ONCE
Status: COMPLETED | OUTPATIENT
Start: 2021-11-09 | End: 2021-11-09

## 2021-11-09 RX ADMIN — ALBUTEROL SULFATE 10 MG: 2.5 SOLUTION RESPIRATORY (INHALATION) at 22:37

## 2021-11-09 RX ADMIN — IPRATROPIUM BROMIDE 1 MG: 0.5 SOLUTION RESPIRATORY (INHALATION) at 22:37

## 2021-11-09 RX ADMIN — ALBUTEROL SULFATE 5 MG: 2.5 SOLUTION RESPIRATORY (INHALATION) at 21:36

## 2021-11-09 RX ADMIN — ISODIUM CHLORIDE 3 ML: 0.03 SOLUTION RESPIRATORY (INHALATION) at 22:37

## 2021-11-09 RX ADMIN — IPRATROPIUM BROMIDE 0.5 MG: 0.5 SOLUTION RESPIRATORY (INHALATION) at 21:36

## 2021-11-09 RX ADMIN — POTASSIUM CHLORIDE 40 MEQ: 1500 TABLET, EXTENDED RELEASE ORAL at 22:49

## 2021-11-09 RX ADMIN — MAGNESIUM SULFATE HEPTAHYDRATE 2 G: 40 INJECTION, SOLUTION INTRAVENOUS at 22:50

## 2021-11-10 PROBLEM — J45.41 MODERATE PERSISTENT ASTHMA WITH ACUTE EXACERBATION: Status: ACTIVE | Noted: 2021-10-11

## 2021-11-10 LAB
2HR DELTA HS TROPONIN: -2 NG/L
4HR DELTA HS TROPONIN: -2 NG/L
ALBUMIN SERPL BCP-MCNC: 3 G/DL (ref 3.5–5)
ALP SERPL-CCNC: 101 U/L (ref 46–116)
ALT SERPL W P-5'-P-CCNC: 14 U/L (ref 12–78)
ANION GAP SERPL CALCULATED.3IONS-SCNC: 6 MMOL/L (ref 4–13)
ANISOCYTOSIS BLD QL SMEAR: PRESENT
ARTIFACT: PRESENT
AST SERPL W P-5'-P-CCNC: 10 U/L (ref 5–45)
BACTERIA UR QL AUTO: ABNORMAL /HPF
BASOPHILS # BLD MANUAL: 0 THOUSAND/UL (ref 0–0.1)
BASOPHILS NFR MAR MANUAL: 0 % (ref 0–1)
BILIRUB SERPL-MCNC: 0.77 MG/DL (ref 0.2–1)
BILIRUB UR QL STRIP: NEGATIVE
BUN SERPL-MCNC: 18 MG/DL (ref 5–25)
CALCIUM ALBUM COR SERPL-MCNC: 9 MG/DL (ref 8.3–10.1)
CALCIUM SERPL-MCNC: 8.2 MG/DL (ref 8.3–10.1)
CARDIAC TROPONIN I PNL SERPL HS: 16 NG/L
CARDIAC TROPONIN I PNL SERPL HS: 16 NG/L
CHLORIDE SERPL-SCNC: 98 MMOL/L (ref 100–108)
CLARITY UR: ABNORMAL
CO2 SERPL-SCNC: 27 MMOL/L (ref 21–32)
COLOR UR: YELLOW
CREAT SERPL-MCNC: 1.73 MG/DL (ref 0.6–1.3)
EOSINOPHIL # BLD MANUAL: 0.11 THOUSAND/UL (ref 0–0.4)
EOSINOPHIL NFR BLD MANUAL: 1 % (ref 0–6)
ERYTHROCYTE [DISTWIDTH] IN BLOOD BY AUTOMATED COUNT: 12.6 % (ref 11.6–15.1)
EST. AVERAGE GLUCOSE BLD GHB EST-MCNC: 154 MG/DL
GFR SERPL CREATININE-BSD FRML MDRD: 44 ML/MIN/1.73SQ M
GLUCOSE SERPL-MCNC: 294 MG/DL (ref 65–140)
GLUCOSE SERPL-MCNC: 332 MG/DL (ref 65–140)
GLUCOSE SERPL-MCNC: 337 MG/DL (ref 65–140)
GLUCOSE SERPL-MCNC: 349 MG/DL (ref 65–140)
GLUCOSE SERPL-MCNC: 371 MG/DL (ref 65–140)
GLUCOSE SERPL-MCNC: 437 MG/DL (ref 65–140)
GLUCOSE UR STRIP-MCNC: ABNORMAL MG/DL
HBA1C MFR BLD: 7 %
HCT VFR BLD AUTO: 38.1 % (ref 36.5–49.3)
HGB BLD-MCNC: 12.3 G/DL (ref 12–17)
HGB UR QL STRIP.AUTO: NEGATIVE
HYALINE CASTS #/AREA URNS LPF: ABNORMAL /LPF
KETONES UR STRIP-MCNC: ABNORMAL MG/DL
LEUKOCYTE ESTERASE UR QL STRIP: NEGATIVE
LYMPHOCYTES # BLD AUTO: 0.21 THOUSAND/UL (ref 0.6–4.47)
LYMPHOCYTES # BLD AUTO: 2 % (ref 14–44)
MAGNESIUM SERPL-MCNC: 2.2 MG/DL (ref 1.6–2.6)
MCH RBC QN AUTO: 30.4 PG (ref 26.8–34.3)
MCHC RBC AUTO-ENTMCNC: 32.3 G/DL (ref 31.4–37.4)
MCV RBC AUTO: 94 FL (ref 82–98)
MONOCYTES # BLD AUTO: 0.11 THOUSAND/UL (ref 0–1.22)
MONOCYTES NFR BLD: 1 % (ref 4–12)
NEUTROPHILS # BLD MANUAL: 10.27 THOUSAND/UL (ref 1.85–7.62)
NEUTS SEG NFR BLD AUTO: 96 % (ref 43–75)
NITRITE UR QL STRIP: NEGATIVE
NON-SQ EPI CELLS URNS QL MICRO: ABNORMAL /HPF
PH UR STRIP.AUTO: 5 [PH]
PHOSPHATE SERPL-MCNC: 2.4 MG/DL (ref 2.7–4.5)
PLATELET # BLD AUTO: 254 THOUSANDS/UL (ref 149–390)
PLATELET BLD QL SMEAR: ADEQUATE
PMV BLD AUTO: 10.5 FL (ref 8.9–12.7)
POIKILOCYTOSIS BLD QL SMEAR: PRESENT
POLYCHROMASIA BLD QL SMEAR: PRESENT
POTASSIUM SERPL-SCNC: 4.4 MMOL/L (ref 3.5–5.3)
PROT SERPL-MCNC: 6.7 G/DL (ref 6.4–8.2)
PROT UR STRIP-MCNC: ABNORMAL MG/DL
RBC # BLD AUTO: 4.05 MILLION/UL (ref 3.88–5.62)
RBC #/AREA URNS AUTO: ABNORMAL /HPF
RBC MORPH BLD: PRESENT
SODIUM SERPL-SCNC: 131 MMOL/L (ref 136–145)
SP GR UR STRIP.AUTO: 1.02 (ref 1–1.03)
UROBILINOGEN UR QL STRIP.AUTO: 1 E.U./DL
WBC # BLD AUTO: 10.7 THOUSAND/UL (ref 4.31–10.16)
WBC #/AREA URNS AUTO: ABNORMAL /HPF

## 2021-11-10 PROCEDURE — 83036 HEMOGLOBIN GLYCOSYLATED A1C: CPT | Performed by: INTERNAL MEDICINE

## 2021-11-10 PROCEDURE — 94150 VITAL CAPACITY TEST: CPT

## 2021-11-10 PROCEDURE — 82948 REAGENT STRIP/BLOOD GLUCOSE: CPT

## 2021-11-10 PROCEDURE — 99223 1ST HOSP IP/OBS HIGH 75: CPT | Performed by: INTERNAL MEDICINE

## 2021-11-10 PROCEDURE — 94640 AIRWAY INHALATION TREATMENT: CPT

## 2021-11-10 PROCEDURE — 3051F HG A1C>EQUAL 7.0%<8.0%: CPT | Performed by: INTERNAL MEDICINE

## 2021-11-10 PROCEDURE — 81001 URINALYSIS AUTO W/SCOPE: CPT | Performed by: INTERNAL MEDICINE

## 2021-11-10 PROCEDURE — 83735 ASSAY OF MAGNESIUM: CPT | Performed by: INTERNAL MEDICINE

## 2021-11-10 PROCEDURE — 84100 ASSAY OF PHOSPHORUS: CPT | Performed by: INTERNAL MEDICINE

## 2021-11-10 PROCEDURE — 80053 COMPREHEN METABOLIC PANEL: CPT | Performed by: INTERNAL MEDICINE

## 2021-11-10 PROCEDURE — 94760 N-INVAS EAR/PLS OXIMETRY 1: CPT

## 2021-11-10 PROCEDURE — 85007 BL SMEAR W/DIFF WBC COUNT: CPT | Performed by: INTERNAL MEDICINE

## 2021-11-10 PROCEDURE — 85027 COMPLETE CBC AUTOMATED: CPT | Performed by: INTERNAL MEDICINE

## 2021-11-10 PROCEDURE — 36415 COLL VENOUS BLD VENIPUNCTURE: CPT | Performed by: INTERNAL MEDICINE

## 2021-11-10 PROCEDURE — 84484 ASSAY OF TROPONIN QUANT: CPT | Performed by: INTERNAL MEDICINE

## 2021-11-10 RX ORDER — FUROSEMIDE 10 MG/ML
40 INJECTION INTRAMUSCULAR; INTRAVENOUS
Status: DISCONTINUED | OUTPATIENT
Start: 2021-11-10 | End: 2021-11-11

## 2021-11-10 RX ORDER — SODIUM CHLORIDE FOR INHALATION 0.9 %
3 VIAL, NEBULIZER (ML) INHALATION EVERY 6 HOURS PRN
Status: DISCONTINUED | OUTPATIENT
Start: 2021-11-10 | End: 2021-11-10

## 2021-11-10 RX ORDER — LEVALBUTEROL 1.25 MG/.5ML
1.25 SOLUTION, CONCENTRATE RESPIRATORY (INHALATION)
Status: DISCONTINUED | OUTPATIENT
Start: 2021-11-10 | End: 2021-11-15 | Stop reason: HOSPADM

## 2021-11-10 RX ORDER — ATORVASTATIN CALCIUM 10 MG/1
10 TABLET, FILM COATED ORAL
Status: DISCONTINUED | OUTPATIENT
Start: 2021-11-10 | End: 2021-11-15 | Stop reason: HOSPADM

## 2021-11-10 RX ORDER — METHYLPREDNISOLONE SODIUM SUCCINATE 40 MG/ML
40 INJECTION, POWDER, LYOPHILIZED, FOR SOLUTION INTRAMUSCULAR; INTRAVENOUS EVERY 6 HOURS SCHEDULED
Status: DISCONTINUED | OUTPATIENT
Start: 2021-11-10 | End: 2021-11-10

## 2021-11-10 RX ORDER — ACETAMINOPHEN 325 MG/1
650 TABLET ORAL EVERY 6 HOURS PRN
Status: DISCONTINUED | OUTPATIENT
Start: 2021-11-10 | End: 2021-11-15 | Stop reason: HOSPADM

## 2021-11-10 RX ORDER — LEVALBUTEROL 1.25 MG/.5ML
1.25 SOLUTION, CONCENTRATE RESPIRATORY (INHALATION) EVERY 6 HOURS PRN
Status: DISCONTINUED | OUTPATIENT
Start: 2021-11-10 | End: 2021-11-10

## 2021-11-10 RX ORDER — MAGNESIUM HYDROXIDE/ALUMINUM HYDROXICE/SIMETHICONE 120; 1200; 1200 MG/30ML; MG/30ML; MG/30ML
30 SUSPENSION ORAL EVERY 6 HOURS PRN
Status: DISCONTINUED | OUTPATIENT
Start: 2021-11-10 | End: 2021-11-15 | Stop reason: HOSPADM

## 2021-11-10 RX ORDER — FUROSEMIDE 40 MG/1
40 TABLET ORAL DAILY
Status: DISCONTINUED | OUTPATIENT
Start: 2021-11-10 | End: 2021-11-10

## 2021-11-10 RX ORDER — NICOTINE 21 MG/24HR
1 PATCH, TRANSDERMAL 24 HOURS TRANSDERMAL DAILY
Status: DISCONTINUED | OUTPATIENT
Start: 2021-11-10 | End: 2021-11-10

## 2021-11-10 RX ORDER — MONTELUKAST SODIUM 10 MG/1
10 TABLET ORAL
Status: DISCONTINUED | OUTPATIENT
Start: 2021-11-10 | End: 2021-11-15 | Stop reason: HOSPADM

## 2021-11-10 RX ORDER — ONDANSETRON 2 MG/ML
4 INJECTION INTRAMUSCULAR; INTRAVENOUS EVERY 6 HOURS PRN
Status: DISCONTINUED | OUTPATIENT
Start: 2021-11-10 | End: 2021-11-15 | Stop reason: HOSPADM

## 2021-11-10 RX ORDER — GUAIFENESIN 600 MG
600 TABLET, EXTENDED RELEASE 12 HR ORAL 2 TIMES DAILY
Status: DISCONTINUED | OUTPATIENT
Start: 2021-11-10 | End: 2021-11-15 | Stop reason: HOSPADM

## 2021-11-10 RX ORDER — METHYLPREDNISOLONE SODIUM SUCCINATE 40 MG/ML
40 INJECTION, POWDER, LYOPHILIZED, FOR SOLUTION INTRAMUSCULAR; INTRAVENOUS EVERY 8 HOURS SCHEDULED
Status: DISCONTINUED | OUTPATIENT
Start: 2021-11-10 | End: 2021-11-11

## 2021-11-10 RX ORDER — DOCUSATE SODIUM 100 MG/1
100 CAPSULE, LIQUID FILLED ORAL 2 TIMES DAILY PRN
Status: DISCONTINUED | OUTPATIENT
Start: 2021-11-10 | End: 2021-11-15 | Stop reason: HOSPADM

## 2021-11-10 RX ORDER — FUROSEMIDE 10 MG/ML
40 INJECTION INTRAMUSCULAR; INTRAVENOUS ONCE
Status: COMPLETED | OUTPATIENT
Start: 2021-11-10 | End: 2021-11-10

## 2021-11-10 RX ORDER — ALBUTEROL SULFATE 2.5 MG/3ML
2.5 SOLUTION RESPIRATORY (INHALATION) EVERY 6 HOURS PRN
Status: DISCONTINUED | OUTPATIENT
Start: 2021-11-10 | End: 2021-11-15 | Stop reason: HOSPADM

## 2021-11-10 RX ADMIN — ENOXAPARIN SODIUM 40 MG: 40 INJECTION SUBCUTANEOUS at 09:12

## 2021-11-10 RX ADMIN — LEVALBUTEROL HYDROCHLORIDE 1.25 MG: 1.25 SOLUTION, CONCENTRATE RESPIRATORY (INHALATION) at 01:27

## 2021-11-10 RX ADMIN — MONTELUKAST SODIUM 10 MG: 10 TABLET, FILM COATED ORAL at 21:49

## 2021-11-10 RX ADMIN — FUROSEMIDE 40 MG: 10 INJECTION, SOLUTION INTRAVENOUS at 16:30

## 2021-11-10 RX ADMIN — GUAIFENESIN 600 MG: 600 TABLET, EXTENDED RELEASE ORAL at 09:43

## 2021-11-10 RX ADMIN — METHYLPREDNISOLONE SODIUM SUCCINATE 40 MG: 40 INJECTION, POWDER, FOR SOLUTION INTRAMUSCULAR; INTRAVENOUS at 01:02

## 2021-11-10 RX ADMIN — IPRATROPIUM BROMIDE 0.5 MG: 0.5 SOLUTION RESPIRATORY (INHALATION) at 01:27

## 2021-11-10 RX ADMIN — METHYLPREDNISOLONE SODIUM SUCCINATE 40 MG: 40 INJECTION, POWDER, FOR SOLUTION INTRAMUSCULAR; INTRAVENOUS at 11:27

## 2021-11-10 RX ADMIN — IPRATROPIUM BROMIDE 0.5 MG: 0.5 SOLUTION RESPIRATORY (INHALATION) at 13:38

## 2021-11-10 RX ADMIN — LEVALBUTEROL HYDROCHLORIDE 1.25 MG: 1.25 SOLUTION, CONCENTRATE RESPIRATORY (INHALATION) at 19:35

## 2021-11-10 RX ADMIN — INSULIN LISPRO 2 UNITS: 100 INJECTION, SOLUTION INTRAVENOUS; SUBCUTANEOUS at 01:15

## 2021-11-10 RX ADMIN — GUAIFENESIN 600 MG: 600 TABLET, EXTENDED RELEASE ORAL at 18:38

## 2021-11-10 RX ADMIN — METHYLPREDNISOLONE SODIUM SUCCINATE 40 MG: 40 INJECTION, POWDER, FOR SOLUTION INTRAMUSCULAR; INTRAVENOUS at 06:39

## 2021-11-10 RX ADMIN — INSULIN LISPRO 8 UNITS: 100 INJECTION, SOLUTION INTRAVENOUS; SUBCUTANEOUS at 11:28

## 2021-11-10 RX ADMIN — INSULIN LISPRO 8 UNITS: 100 INJECTION, SOLUTION INTRAVENOUS; SUBCUTANEOUS at 16:36

## 2021-11-10 RX ADMIN — INSULIN LISPRO 10 UNITS: 100 INJECTION, SOLUTION INTRAVENOUS; SUBCUTANEOUS at 06:42

## 2021-11-10 RX ADMIN — ENOXAPARIN SODIUM 40 MG: 40 INJECTION SUBCUTANEOUS at 21:49

## 2021-11-10 RX ADMIN — METHYLPREDNISOLONE SODIUM SUCCINATE 40 MG: 40 INJECTION, POWDER, FOR SOLUTION INTRAMUSCULAR; INTRAVENOUS at 21:49

## 2021-11-10 RX ADMIN — IPRATROPIUM BROMIDE 0.5 MG: 0.5 SOLUTION RESPIRATORY (INHALATION) at 08:05

## 2021-11-10 RX ADMIN — LEVALBUTEROL HYDROCHLORIDE 1.25 MG: 1.25 SOLUTION, CONCENTRATE RESPIRATORY (INHALATION) at 08:05

## 2021-11-10 RX ADMIN — INSULIN LISPRO 3 UNITS: 100 INJECTION, SOLUTION INTRAVENOUS; SUBCUTANEOUS at 21:52

## 2021-11-10 RX ADMIN — ATORVASTATIN CALCIUM 10 MG: 10 TABLET, FILM COATED ORAL at 16:30

## 2021-11-10 RX ADMIN — FUROSEMIDE 40 MG: 10 INJECTION, SOLUTION INTRAVENOUS at 11:28

## 2021-11-10 RX ADMIN — FUROSEMIDE 40 MG: 40 TABLET ORAL at 09:43

## 2021-11-10 RX ADMIN — LEVALBUTEROL HYDROCHLORIDE 1.25 MG: 1.25 SOLUTION, CONCENTRATE RESPIRATORY (INHALATION) at 13:39

## 2021-11-10 RX ADMIN — IPRATROPIUM BROMIDE 0.5 MG: 0.5 SOLUTION RESPIRATORY (INHALATION) at 19:35

## 2021-11-11 LAB
ANION GAP SERPL CALCULATED.3IONS-SCNC: 12 MMOL/L (ref 4–13)
BUN SERPL-MCNC: 27 MG/DL (ref 5–25)
CALCIUM SERPL-MCNC: 7.8 MG/DL (ref 8.3–10.1)
CHLORIDE SERPL-SCNC: 95 MMOL/L (ref 100–108)
CO2 SERPL-SCNC: 27 MMOL/L (ref 21–32)
CREAT SERPL-MCNC: 1.59 MG/DL (ref 0.6–1.3)
GFR SERPL CREATININE-BSD FRML MDRD: 48 ML/MIN/1.73SQ M
GLUCOSE SERPL-MCNC: 183 MG/DL (ref 65–140)
GLUCOSE SERPL-MCNC: 221 MG/DL (ref 65–140)
GLUCOSE SERPL-MCNC: 352 MG/DL (ref 65–140)
GLUCOSE SERPL-MCNC: 364 MG/DL (ref 65–140)
GLUCOSE SERPL-MCNC: 426 MG/DL (ref 65–140)
GLUCOSE SERPL-MCNC: 89 MG/DL (ref 65–140)
POTASSIUM SERPL-SCNC: 4.1 MMOL/L (ref 3.5–5.3)
SODIUM SERPL-SCNC: 134 MMOL/L (ref 136–145)

## 2021-11-11 PROCEDURE — 94150 VITAL CAPACITY TEST: CPT

## 2021-11-11 PROCEDURE — 94760 N-INVAS EAR/PLS OXIMETRY 1: CPT

## 2021-11-11 PROCEDURE — 80048 BASIC METABOLIC PNL TOTAL CA: CPT | Performed by: INTERNAL MEDICINE

## 2021-11-11 PROCEDURE — 94640 AIRWAY INHALATION TREATMENT: CPT

## 2021-11-11 PROCEDURE — 99233 SBSQ HOSP IP/OBS HIGH 50: CPT | Performed by: INTERNAL MEDICINE

## 2021-11-11 PROCEDURE — 82948 REAGENT STRIP/BLOOD GLUCOSE: CPT

## 2021-11-11 RX ORDER — INSULIN GLARGINE 100 [IU]/ML
30 INJECTION, SOLUTION SUBCUTANEOUS
Status: DISCONTINUED | OUTPATIENT
Start: 2021-11-11 | End: 2021-11-11

## 2021-11-11 RX ORDER — FLUTICASONE FUROATE AND VILANTEROL 100; 25 UG/1; UG/1
1 POWDER RESPIRATORY (INHALATION)
Status: DISCONTINUED | OUTPATIENT
Start: 2021-11-12 | End: 2021-11-12

## 2021-11-11 RX ORDER — INSULIN GLARGINE 100 [IU]/ML
45 INJECTION, SOLUTION SUBCUTANEOUS
Status: DISCONTINUED | OUTPATIENT
Start: 2021-11-11 | End: 2021-11-12

## 2021-11-11 RX ORDER — ECHINACEA PURPUREA EXTRACT 125 MG
1 TABLET ORAL
Status: DISCONTINUED | OUTPATIENT
Start: 2021-11-11 | End: 2021-11-14

## 2021-11-11 RX ORDER — METHYLPREDNISOLONE SODIUM SUCCINATE 40 MG/ML
40 INJECTION, POWDER, LYOPHILIZED, FOR SOLUTION INTRAMUSCULAR; INTRAVENOUS EVERY 12 HOURS SCHEDULED
Status: DISCONTINUED | OUTPATIENT
Start: 2021-11-11 | End: 2021-11-12

## 2021-11-11 RX ORDER — FUROSEMIDE 10 MG/ML
60 INJECTION INTRAMUSCULAR; INTRAVENOUS
Status: DISCONTINUED | OUTPATIENT
Start: 2021-11-12 | End: 2021-11-12

## 2021-11-11 RX ORDER — INSULIN GLARGINE 100 [IU]/ML
25 INJECTION, SOLUTION SUBCUTANEOUS
Status: DISCONTINUED | OUTPATIENT
Start: 2021-11-11 | End: 2021-11-11

## 2021-11-11 RX ADMIN — FUROSEMIDE 40 MG: 10 INJECTION, SOLUTION INTRAVENOUS at 09:56

## 2021-11-11 RX ADMIN — LEVALBUTEROL HYDROCHLORIDE 1.25 MG: 1.25 SOLUTION, CONCENTRATE RESPIRATORY (INHALATION) at 19:16

## 2021-11-11 RX ADMIN — GUAIFENESIN 600 MG: 600 TABLET, EXTENDED RELEASE ORAL at 17:14

## 2021-11-11 RX ADMIN — IPRATROPIUM BROMIDE 0.5 MG: 0.5 SOLUTION RESPIRATORY (INHALATION) at 19:16

## 2021-11-11 RX ADMIN — LEVALBUTEROL HYDROCHLORIDE 1.25 MG: 1.25 SOLUTION, CONCENTRATE RESPIRATORY (INHALATION) at 13:32

## 2021-11-11 RX ADMIN — METHYLPREDNISOLONE SODIUM SUCCINATE 40 MG: 40 INJECTION, POWDER, FOR SOLUTION INTRAMUSCULAR; INTRAVENOUS at 21:27

## 2021-11-11 RX ADMIN — INSULIN GLARGINE 15 UNITS: 100 INJECTION, SOLUTION SUBCUTANEOUS at 23:39

## 2021-11-11 RX ADMIN — Medication 1 SPRAY: at 19:15

## 2021-11-11 RX ADMIN — Medication 1 SPRAY: at 21:25

## 2021-11-11 RX ADMIN — ENOXAPARIN SODIUM 40 MG: 40 INJECTION SUBCUTANEOUS at 21:27

## 2021-11-11 RX ADMIN — LEVALBUTEROL HYDROCHLORIDE 1.25 MG: 1.25 SOLUTION, CONCENTRATE RESPIRATORY (INHALATION) at 07:54

## 2021-11-11 RX ADMIN — IPRATROPIUM BROMIDE 0.5 MG: 0.5 SOLUTION RESPIRATORY (INHALATION) at 00:32

## 2021-11-11 RX ADMIN — Medication 1 SPRAY: at 17:14

## 2021-11-11 RX ADMIN — INSULIN LISPRO 10 UNITS: 100 INJECTION, SOLUTION INTRAVENOUS; SUBCUTANEOUS at 11:11

## 2021-11-11 RX ADMIN — ENOXAPARIN SODIUM 40 MG: 40 INJECTION SUBCUTANEOUS at 09:57

## 2021-11-11 RX ADMIN — INSULIN LISPRO 12 UNITS: 100 INJECTION, SOLUTION INTRAVENOUS; SUBCUTANEOUS at 11:10

## 2021-11-11 RX ADMIN — MONTELUKAST SODIUM 10 MG: 10 TABLET, FILM COATED ORAL at 21:26

## 2021-11-11 RX ADMIN — IPRATROPIUM BROMIDE 0.5 MG: 0.5 SOLUTION RESPIRATORY (INHALATION) at 13:32

## 2021-11-11 RX ADMIN — IPRATROPIUM BROMIDE 0.5 MG: 0.5 SOLUTION RESPIRATORY (INHALATION) at 07:54

## 2021-11-11 RX ADMIN — METHYLPREDNISOLONE SODIUM SUCCINATE 40 MG: 40 INJECTION, POWDER, FOR SOLUTION INTRAMUSCULAR; INTRAVENOUS at 06:00

## 2021-11-11 RX ADMIN — INSULIN LISPRO 10 UNITS: 100 INJECTION, SOLUTION INTRAVENOUS; SUBCUTANEOUS at 06:00

## 2021-11-11 RX ADMIN — INSULIN GLARGINE 30 UNITS: 100 INJECTION, SOLUTION SUBCUTANEOUS at 09:57

## 2021-11-11 RX ADMIN — ATORVASTATIN CALCIUM 10 MG: 10 TABLET, FILM COATED ORAL at 17:13

## 2021-11-11 RX ADMIN — INSULIN LISPRO 4 UNITS: 100 INJECTION, SOLUTION INTRAVENOUS; SUBCUTANEOUS at 17:14

## 2021-11-11 RX ADMIN — GUAIFENESIN 600 MG: 600 TABLET, EXTENDED RELEASE ORAL at 09:56

## 2021-11-11 RX ADMIN — LEVALBUTEROL HYDROCHLORIDE 1.25 MG: 1.25 SOLUTION, CONCENTRATE RESPIRATORY (INHALATION) at 00:32

## 2021-11-12 ENCOUNTER — APPOINTMENT (INPATIENT)
Dept: NON INVASIVE DIAGNOSTICS | Facility: HOSPITAL | Age: 56
DRG: 189 | End: 2021-11-12
Payer: COMMERCIAL

## 2021-11-12 PROBLEM — I50.33 ACUTE ON CHRONIC DIASTOLIC HEART FAILURE (HCC): Status: ACTIVE | Noted: 2021-11-12

## 2021-11-12 LAB
ANION GAP SERPL CALCULATED.3IONS-SCNC: 5 MMOL/L (ref 4–13)
AORTIC ROOT: 3.8 CM
AORTIC VALVE MEAN VELOCITY: 9.8 M/S
APICAL FOUR CHAMBER EJECTION FRACTION: 45 %
ASCENDING AORTA: 3.4 CM
AV LVOT MEAN GRADIENT: 3 MMHG
AV LVOT PEAK GRADIENT: 5 MMHG
AV MEAN GRADIENT: 4 MMHG
AV PEAK GRADIENT: 7 MMHG
BUN SERPL-MCNC: 28 MG/DL (ref 5–25)
CALCIUM SERPL-MCNC: 8.1 MG/DL (ref 8.3–10.1)
CHLORIDE SERPL-SCNC: 101 MMOL/L (ref 100–108)
CO2 SERPL-SCNC: 28 MMOL/L (ref 21–32)
CREAT SERPL-MCNC: 1.37 MG/DL (ref 0.6–1.3)
DOP CALC AO VTI: 29.04 CM
DOP CALC LVOT PEAK VEL VTI: 24.69 CM
DOP CALC LVOT PEAK VEL: 1.08 M/S
E WAVE DECELERATION TIME: 177 MS
FRACTIONAL SHORTENING: 20 % (ref 28–44)
GFR SERPL CREATININE-BSD FRML MDRD: 58 ML/MIN/1.73SQ M
GLUCOSE SERPL-MCNC: 149 MG/DL (ref 65–140)
GLUCOSE SERPL-MCNC: 197 MG/DL (ref 65–140)
GLUCOSE SERPL-MCNC: 220 MG/DL (ref 65–140)
GLUCOSE SERPL-MCNC: 257 MG/DL (ref 65–140)
GLUCOSE SERPL-MCNC: 272 MG/DL (ref 65–140)
GLUCOSE SERPL-MCNC: 279 MG/DL (ref 65–140)
GLUCOSE SERPL-MCNC: 288 MG/DL (ref 65–140)
INTERVENTRICULAR SEPTUM IN DIASTOLE (PARASTERNAL SHORT AXIS VIEW): 1.2 CM
IVC: 20 MM
LEFT INTERNAL DIMENSION IN SYSTOLE: 4.8 CM (ref 2.1–4)
LEFT VENTRICULAR INTERNAL DIMENSION IN DIASTOLE: 6 CM (ref 55.83–83.27)
LEFT VENTRICULAR POSTERIOR WALL IN END DIASTOLE: 1 CM
LEFT VENTRICULAR STROKE VOLUME: 85 ML
MV E'TISSUE VEL-SEP: 6 CM/S
MV PEAK A VEL: 0.74 M/S
MV PEAK E VEL: 92 CM/S
MV STENOSIS PRESSURE HALF TIME: 0 MS
POTASSIUM SERPL-SCNC: 4.3 MMOL/L (ref 3.5–5.3)
SL CV LV EF: 55
SL CV PED ECHO LEFT VENTRICLE DIASTOLIC VOLUME (MOD BIPLANE) 2D: 193 ML
SL CV PED ECHO LEFT VENTRICLE SYSTOLIC VOLUME (MOD BIPLANE) 2D: 108 ML
SODIUM SERPL-SCNC: 134 MMOL/L (ref 136–145)
TRICUSPID VALVE S': 1.1 CM/S
Z-SCORE OF LEFT VENTRICULAR DIMENSION IN END SYSTOLE: -24.3

## 2021-11-12 PROCEDURE — 94640 AIRWAY INHALATION TREATMENT: CPT

## 2021-11-12 PROCEDURE — 93321 DOPPLER ECHO F-UP/LMTD STD: CPT | Performed by: INTERNAL MEDICINE

## 2021-11-12 PROCEDURE — 94640 AIRWAY INHALATION TREATMENT: CPT | Performed by: SOCIAL WORKER

## 2021-11-12 PROCEDURE — 94760 N-INVAS EAR/PLS OXIMETRY 1: CPT | Performed by: SOCIAL WORKER

## 2021-11-12 PROCEDURE — 93308 TTE F-UP OR LMTD: CPT

## 2021-11-12 PROCEDURE — 94150 VITAL CAPACITY TEST: CPT

## 2021-11-12 PROCEDURE — 99233 SBSQ HOSP IP/OBS HIGH 50: CPT | Performed by: INTERNAL MEDICINE

## 2021-11-12 PROCEDURE — 94760 N-INVAS EAR/PLS OXIMETRY 1: CPT

## 2021-11-12 PROCEDURE — 93308 TTE F-UP OR LMTD: CPT | Performed by: INTERNAL MEDICINE

## 2021-11-12 PROCEDURE — 82948 REAGENT STRIP/BLOOD GLUCOSE: CPT

## 2021-11-12 PROCEDURE — 93325 DOPPLER ECHO COLOR FLOW MAPG: CPT | Performed by: INTERNAL MEDICINE

## 2021-11-12 PROCEDURE — 80048 BASIC METABOLIC PNL TOTAL CA: CPT | Performed by: INTERNAL MEDICINE

## 2021-11-12 PROCEDURE — 99232 SBSQ HOSP IP/OBS MODERATE 35: CPT | Performed by: INTERNAL MEDICINE

## 2021-11-12 RX ORDER — PREDNISONE 20 MG/1
40 TABLET ORAL DAILY
Status: DISCONTINUED | OUTPATIENT
Start: 2021-11-12 | End: 2021-11-12

## 2021-11-12 RX ORDER — INSULIN GLARGINE 100 [IU]/ML
30 INJECTION, SOLUTION SUBCUTANEOUS
Status: DISCONTINUED | OUTPATIENT
Start: 2021-11-12 | End: 2021-11-13

## 2021-11-12 RX ORDER — FUROSEMIDE 10 MG/ML
60 INJECTION INTRAMUSCULAR; INTRAVENOUS
Status: DISCONTINUED | OUTPATIENT
Start: 2021-11-13 | End: 2021-11-15

## 2021-11-12 RX ORDER — FUROSEMIDE 10 MG/ML
80 INJECTION INTRAMUSCULAR; INTRAVENOUS
Status: DISCONTINUED | OUTPATIENT
Start: 2021-11-12 | End: 2021-11-12

## 2021-11-12 RX ORDER — FUROSEMIDE 10 MG/ML
40 INJECTION INTRAMUSCULAR; INTRAVENOUS
Status: DISCONTINUED | OUTPATIENT
Start: 2021-11-12 | End: 2021-11-12

## 2021-11-12 RX ORDER — METHYLPREDNISOLONE SODIUM SUCCINATE 40 MG/ML
40 INJECTION, POWDER, LYOPHILIZED, FOR SOLUTION INTRAMUSCULAR; INTRAVENOUS DAILY
Status: COMPLETED | OUTPATIENT
Start: 2021-11-13 | End: 2021-11-14

## 2021-11-12 RX ORDER — FLUTICASONE FUROATE AND VILANTEROL 200; 25 UG/1; UG/1
1 POWDER RESPIRATORY (INHALATION) DAILY
Status: DISCONTINUED | OUTPATIENT
Start: 2021-11-13 | End: 2021-11-15 | Stop reason: HOSPADM

## 2021-11-12 RX ADMIN — INSULIN LISPRO 6 UNITS: 100 INJECTION, SOLUTION INTRAVENOUS; SUBCUTANEOUS at 12:40

## 2021-11-12 RX ADMIN — GUAIFENESIN 600 MG: 600 TABLET, EXTENDED RELEASE ORAL at 09:25

## 2021-11-12 RX ADMIN — FUROSEMIDE 80 MG: 10 INJECTION, SOLUTION INTRAMUSCULAR; INTRAVENOUS at 16:55

## 2021-11-12 RX ADMIN — IPRATROPIUM BROMIDE 0.5 MG: 0.5 SOLUTION RESPIRATORY (INHALATION) at 00:30

## 2021-11-12 RX ADMIN — IPRATROPIUM BROMIDE 0.5 MG: 0.5 SOLUTION RESPIRATORY (INHALATION) at 19:12

## 2021-11-12 RX ADMIN — FLUTICASONE FUROATE AND VILANTEROL TRIFENATATE 1 PUFF: 100; 25 POWDER RESPIRATORY (INHALATION) at 11:08

## 2021-11-12 RX ADMIN — LEVALBUTEROL HYDROCHLORIDE 1.25 MG: 1.25 SOLUTION, CONCENTRATE RESPIRATORY (INHALATION) at 14:00

## 2021-11-12 RX ADMIN — LEVALBUTEROL HYDROCHLORIDE 1.25 MG: 1.25 SOLUTION, CONCENTRATE RESPIRATORY (INHALATION) at 19:12

## 2021-11-12 RX ADMIN — ENOXAPARIN SODIUM 40 MG: 40 INJECTION SUBCUTANEOUS at 09:25

## 2021-11-12 RX ADMIN — Medication 1 SPRAY: at 21:07

## 2021-11-12 RX ADMIN — LEVALBUTEROL HYDROCHLORIDE 1.25 MG: 1.25 SOLUTION, CONCENTRATE RESPIRATORY (INHALATION) at 00:30

## 2021-11-12 RX ADMIN — ENOXAPARIN SODIUM 40 MG: 40 INJECTION SUBCUTANEOUS at 21:06

## 2021-11-12 RX ADMIN — IPRATROPIUM BROMIDE 0.5 MG: 0.5 SOLUTION RESPIRATORY (INHALATION) at 09:02

## 2021-11-12 RX ADMIN — Medication 1 SPRAY: at 16:58

## 2021-11-12 RX ADMIN — INSULIN GLARGINE 30 UNITS: 100 INJECTION, SOLUTION SUBCUTANEOUS at 21:06

## 2021-11-12 RX ADMIN — METHYLPREDNISOLONE SODIUM SUCCINATE 40 MG: 40 INJECTION, POWDER, FOR SOLUTION INTRAMUSCULAR; INTRAVENOUS at 09:25

## 2021-11-12 RX ADMIN — Medication 1 SPRAY: at 19:48

## 2021-11-12 RX ADMIN — LEVALBUTEROL HYDROCHLORIDE 1.25 MG: 1.25 SOLUTION, CONCENTRATE RESPIRATORY (INHALATION) at 09:02

## 2021-11-12 RX ADMIN — FUROSEMIDE 60 MG: 10 INJECTION, SOLUTION INTRAMUSCULAR; INTRAVENOUS at 09:38

## 2021-11-12 RX ADMIN — INSULIN LISPRO 6 UNITS: 100 INJECTION, SOLUTION INTRAVENOUS; SUBCUTANEOUS at 06:34

## 2021-11-12 RX ADMIN — ATORVASTATIN CALCIUM 10 MG: 10 TABLET, FILM COATED ORAL at 16:55

## 2021-11-12 RX ADMIN — PERFLUTREN 1 ML/MIN: 6.52 INJECTION, SUSPENSION INTRAVENOUS at 13:45

## 2021-11-12 RX ADMIN — GUAIFENESIN 600 MG: 600 TABLET, EXTENDED RELEASE ORAL at 17:16

## 2021-11-12 RX ADMIN — IPRATROPIUM BROMIDE 0.5 MG: 0.5 SOLUTION RESPIRATORY (INHALATION) at 14:00

## 2021-11-12 RX ADMIN — INSULIN LISPRO 2 UNITS: 100 INJECTION, SOLUTION INTRAVENOUS; SUBCUTANEOUS at 16:59

## 2021-11-12 RX ADMIN — MONTELUKAST SODIUM 10 MG: 10 TABLET, FILM COATED ORAL at 21:06

## 2021-11-13 LAB
ANION GAP SERPL CALCULATED.3IONS-SCNC: 5 MMOL/L (ref 4–13)
BUN SERPL-MCNC: 26 MG/DL (ref 5–25)
CALCIUM SERPL-MCNC: 8.6 MG/DL (ref 8.3–10.1)
CHLORIDE SERPL-SCNC: 101 MMOL/L (ref 100–108)
CO2 SERPL-SCNC: 31 MMOL/L (ref 21–32)
CREAT SERPL-MCNC: 1.33 MG/DL (ref 0.6–1.3)
GFR SERPL CREATININE-BSD FRML MDRD: 60 ML/MIN/1.73SQ M
GLUCOSE SERPL-MCNC: 139 MG/DL (ref 65–140)
GLUCOSE SERPL-MCNC: 140 MG/DL (ref 65–140)
GLUCOSE SERPL-MCNC: 148 MG/DL (ref 65–140)
GLUCOSE SERPL-MCNC: 154 MG/DL (ref 65–140)
GLUCOSE SERPL-MCNC: 160 MG/DL (ref 65–140)
GLUCOSE SERPL-MCNC: 73 MG/DL (ref 65–140)
GLUCOSE SERPL-MCNC: 96 MG/DL (ref 65–140)
POTASSIUM SERPL-SCNC: 3.4 MMOL/L (ref 3.5–5.3)
SODIUM SERPL-SCNC: 137 MMOL/L (ref 136–145)

## 2021-11-13 PROCEDURE — 94150 VITAL CAPACITY TEST: CPT

## 2021-11-13 PROCEDURE — 99232 SBSQ HOSP IP/OBS MODERATE 35: CPT | Performed by: NURSE PRACTITIONER

## 2021-11-13 PROCEDURE — 94640 AIRWAY INHALATION TREATMENT: CPT

## 2021-11-13 PROCEDURE — 94760 N-INVAS EAR/PLS OXIMETRY 1: CPT

## 2021-11-13 PROCEDURE — 82948 REAGENT STRIP/BLOOD GLUCOSE: CPT

## 2021-11-13 PROCEDURE — 99232 SBSQ HOSP IP/OBS MODERATE 35: CPT | Performed by: INTERNAL MEDICINE

## 2021-11-13 PROCEDURE — 80048 BASIC METABOLIC PNL TOTAL CA: CPT | Performed by: INTERNAL MEDICINE

## 2021-11-13 RX ORDER — POTASSIUM CHLORIDE 20 MEQ/1
40 TABLET, EXTENDED RELEASE ORAL ONCE
Status: COMPLETED | OUTPATIENT
Start: 2021-11-13 | End: 2021-11-13

## 2021-11-13 RX ORDER — INSULIN GLARGINE 100 [IU]/ML
20 INJECTION, SOLUTION SUBCUTANEOUS
Status: DISCONTINUED | OUTPATIENT
Start: 2021-11-13 | End: 2021-11-15 | Stop reason: HOSPADM

## 2021-11-13 RX ADMIN — IPRATROPIUM BROMIDE 0.5 MG: 0.5 SOLUTION RESPIRATORY (INHALATION) at 20:21

## 2021-11-13 RX ADMIN — MONTELUKAST SODIUM 10 MG: 10 TABLET, FILM COATED ORAL at 21:57

## 2021-11-13 RX ADMIN — INSULIN LISPRO 2 UNITS: 100 INJECTION, SOLUTION INTRAVENOUS; SUBCUTANEOUS at 07:46

## 2021-11-13 RX ADMIN — IPRATROPIUM BROMIDE 0.5 MG: 0.5 SOLUTION RESPIRATORY (INHALATION) at 01:09

## 2021-11-13 RX ADMIN — FLUTICASONE FUROATE AND VILANTEROL TRIFENATATE 1 PUFF: 200; 25 POWDER RESPIRATORY (INHALATION) at 10:12

## 2021-11-13 RX ADMIN — Medication 1 SPRAY: at 21:18

## 2021-11-13 RX ADMIN — Medication 1 SPRAY: at 03:49

## 2021-11-13 RX ADMIN — Medication 1 SPRAY: at 17:12

## 2021-11-13 RX ADMIN — METHYLPREDNISOLONE SODIUM SUCCINATE 40 MG: 40 INJECTION, POWDER, FOR SOLUTION INTRAMUSCULAR; INTRAVENOUS at 10:08

## 2021-11-13 RX ADMIN — FUROSEMIDE 60 MG: 10 INJECTION, SOLUTION INTRAMUSCULAR; INTRAVENOUS at 17:05

## 2021-11-13 RX ADMIN — LEVALBUTEROL HYDROCHLORIDE 1.25 MG: 1.25 SOLUTION, CONCENTRATE RESPIRATORY (INHALATION) at 01:09

## 2021-11-13 RX ADMIN — IPRATROPIUM BROMIDE 0.5 MG: 0.5 SOLUTION RESPIRATORY (INHALATION) at 07:51

## 2021-11-13 RX ADMIN — Medication 1 SPRAY: at 01:34

## 2021-11-13 RX ADMIN — GUAIFENESIN 600 MG: 600 TABLET, EXTENDED RELEASE ORAL at 17:12

## 2021-11-13 RX ADMIN — POTASSIUM CHLORIDE 40 MEQ: 1500 TABLET, EXTENDED RELEASE ORAL at 14:43

## 2021-11-13 RX ADMIN — GUAIFENESIN 600 MG: 600 TABLET, EXTENDED RELEASE ORAL at 10:10

## 2021-11-13 RX ADMIN — ENOXAPARIN SODIUM 40 MG: 40 INJECTION SUBCUTANEOUS at 10:11

## 2021-11-13 RX ADMIN — Medication 1 SPRAY: at 05:44

## 2021-11-13 RX ADMIN — Medication 1 SPRAY: at 14:47

## 2021-11-13 RX ADMIN — ENOXAPARIN SODIUM 40 MG: 40 INJECTION SUBCUTANEOUS at 21:24

## 2021-11-13 RX ADMIN — FUROSEMIDE 60 MG: 10 INJECTION, SOLUTION INTRAMUSCULAR; INTRAVENOUS at 07:40

## 2021-11-13 RX ADMIN — LEVALBUTEROL HYDROCHLORIDE 1.25 MG: 1.25 SOLUTION, CONCENTRATE RESPIRATORY (INHALATION) at 20:21

## 2021-11-13 RX ADMIN — ATORVASTATIN CALCIUM 10 MG: 10 TABLET, FILM COATED ORAL at 18:14

## 2021-11-13 RX ADMIN — IPRATROPIUM BROMIDE 0.5 MG: 0.5 SOLUTION RESPIRATORY (INHALATION) at 13:20

## 2021-11-13 RX ADMIN — LEVALBUTEROL HYDROCHLORIDE 1.25 MG: 1.25 SOLUTION, CONCENTRATE RESPIRATORY (INHALATION) at 07:51

## 2021-11-13 RX ADMIN — Medication 1 SPRAY: at 11:19

## 2021-11-13 RX ADMIN — Medication 1 SPRAY: at 10:10

## 2021-11-13 RX ADMIN — LEVALBUTEROL HYDROCHLORIDE 1.25 MG: 1.25 SOLUTION, CONCENTRATE RESPIRATORY (INHALATION) at 13:20

## 2021-11-13 RX ADMIN — INSULIN GLARGINE 20 UNITS: 100 INJECTION, SOLUTION SUBCUTANEOUS at 21:24

## 2021-11-13 RX ADMIN — Medication 1 SPRAY: at 07:40

## 2021-11-13 RX ADMIN — Medication 1 SPRAY: at 20:11

## 2021-11-14 PROBLEM — E87.6 HYPOKALEMIA: Status: ACTIVE | Noted: 2021-11-14

## 2021-11-14 LAB
ALBUMIN SERPL BCP-MCNC: 2.9 G/DL (ref 3.5–5)
ALP SERPL-CCNC: 66 U/L (ref 46–116)
ALT SERPL W P-5'-P-CCNC: 16 U/L (ref 12–78)
ANION GAP SERPL CALCULATED.3IONS-SCNC: 7 MMOL/L (ref 4–13)
AST SERPL W P-5'-P-CCNC: 13 U/L (ref 5–45)
BASOPHILS # BLD AUTO: 0.03 THOUSANDS/ΜL (ref 0–0.1)
BASOPHILS NFR BLD AUTO: 0 % (ref 0–1)
BILIRUB SERPL-MCNC: 0.52 MG/DL (ref 0.2–1)
BUN SERPL-MCNC: 27 MG/DL (ref 5–25)
CALCIUM ALBUM COR SERPL-MCNC: 9.2 MG/DL (ref 8.3–10.1)
CALCIUM SERPL-MCNC: 8.3 MG/DL (ref 8.3–10.1)
CHLORIDE SERPL-SCNC: 101 MMOL/L (ref 100–108)
CO2 SERPL-SCNC: 29 MMOL/L (ref 21–32)
CREAT SERPL-MCNC: 1.33 MG/DL (ref 0.6–1.3)
EOSINOPHIL # BLD AUTO: 0.15 THOUSAND/ΜL (ref 0–0.61)
EOSINOPHIL NFR BLD AUTO: 1 % (ref 0–6)
ERYTHROCYTE [DISTWIDTH] IN BLOOD BY AUTOMATED COUNT: 12.6 % (ref 11.6–15.1)
GFR SERPL CREATININE-BSD FRML MDRD: 60 ML/MIN/1.73SQ M
GLUCOSE SERPL-MCNC: 116 MG/DL (ref 65–140)
GLUCOSE SERPL-MCNC: 122 MG/DL (ref 65–140)
GLUCOSE SERPL-MCNC: 128 MG/DL (ref 65–140)
GLUCOSE SERPL-MCNC: 215 MG/DL (ref 65–140)
GLUCOSE SERPL-MCNC: 285 MG/DL (ref 65–140)
HCT VFR BLD AUTO: 36.4 % (ref 36.5–49.3)
HGB BLD-MCNC: 12.2 G/DL (ref 12–17)
IMM GRANULOCYTES # BLD AUTO: 0.16 THOUSAND/UL (ref 0–0.2)
IMM GRANULOCYTES NFR BLD AUTO: 2 % (ref 0–2)
LYMPHOCYTES # BLD AUTO: 2.01 THOUSANDS/ΜL (ref 0.6–4.47)
LYMPHOCYTES NFR BLD AUTO: 19 % (ref 14–44)
MCH RBC QN AUTO: 31 PG (ref 26.8–34.3)
MCHC RBC AUTO-ENTMCNC: 33.5 G/DL (ref 31.4–37.4)
MCV RBC AUTO: 93 FL (ref 82–98)
MONOCYTES # BLD AUTO: 0.91 THOUSAND/ΜL (ref 0.17–1.22)
MONOCYTES NFR BLD AUTO: 9 % (ref 4–12)
NEUTROPHILS # BLD AUTO: 7.23 THOUSANDS/ΜL (ref 1.85–7.62)
NEUTS SEG NFR BLD AUTO: 69 % (ref 43–75)
NRBC BLD AUTO-RTO: 0 /100 WBCS
PLATELET # BLD AUTO: 217 THOUSANDS/UL (ref 149–390)
PMV BLD AUTO: 10.8 FL (ref 8.9–12.7)
POTASSIUM SERPL-SCNC: 3.4 MMOL/L (ref 3.5–5.3)
PROT SERPL-MCNC: 6.2 G/DL (ref 6.4–8.2)
RBC # BLD AUTO: 3.93 MILLION/UL (ref 3.88–5.62)
SODIUM SERPL-SCNC: 137 MMOL/L (ref 136–145)
WBC # BLD AUTO: 10.49 THOUSAND/UL (ref 4.31–10.16)

## 2021-11-14 PROCEDURE — 94640 AIRWAY INHALATION TREATMENT: CPT

## 2021-11-14 PROCEDURE — 94664 DEMO&/EVAL PT USE INHALER: CPT

## 2021-11-14 PROCEDURE — 85025 COMPLETE CBC W/AUTO DIFF WBC: CPT | Performed by: NURSE PRACTITIONER

## 2021-11-14 PROCEDURE — 94150 VITAL CAPACITY TEST: CPT

## 2021-11-14 PROCEDURE — 94760 N-INVAS EAR/PLS OXIMETRY 1: CPT

## 2021-11-14 PROCEDURE — 82948 REAGENT STRIP/BLOOD GLUCOSE: CPT

## 2021-11-14 PROCEDURE — 80053 COMPREHEN METABOLIC PANEL: CPT | Performed by: NURSE PRACTITIONER

## 2021-11-14 PROCEDURE — 99232 SBSQ HOSP IP/OBS MODERATE 35: CPT | Performed by: NURSE PRACTITIONER

## 2021-11-14 PROCEDURE — 99232 SBSQ HOSP IP/OBS MODERATE 35: CPT | Performed by: INTERNAL MEDICINE

## 2021-11-14 RX ORDER — PREDNISONE 20 MG/1
40 TABLET ORAL DAILY
Status: DISCONTINUED | OUTPATIENT
Start: 2021-11-15 | End: 2021-11-15 | Stop reason: HOSPADM

## 2021-11-14 RX ORDER — POTASSIUM CHLORIDE 20 MEQ/1
40 TABLET, EXTENDED RELEASE ORAL ONCE
Status: COMPLETED | OUTPATIENT
Start: 2021-11-14 | End: 2021-11-14

## 2021-11-14 RX ORDER — ECHINACEA PURPUREA EXTRACT 125 MG
1 TABLET ORAL 3 TIMES DAILY
Status: DISCONTINUED | OUTPATIENT
Start: 2021-11-14 | End: 2021-11-15 | Stop reason: HOSPADM

## 2021-11-14 RX ADMIN — GUAIFENESIN 600 MG: 600 TABLET, EXTENDED RELEASE ORAL at 17:00

## 2021-11-14 RX ADMIN — MONTELUKAST SODIUM 10 MG: 10 TABLET, FILM COATED ORAL at 21:00

## 2021-11-14 RX ADMIN — Medication 1 SPRAY: at 13:05

## 2021-11-14 RX ADMIN — Medication 1 SPRAY: at 08:50

## 2021-11-14 RX ADMIN — GUAIFENESIN 600 MG: 600 TABLET, EXTENDED RELEASE ORAL at 08:27

## 2021-11-14 RX ADMIN — LEVALBUTEROL HYDROCHLORIDE 1.25 MG: 1.25 SOLUTION, CONCENTRATE RESPIRATORY (INHALATION) at 13:10

## 2021-11-14 RX ADMIN — IPRATROPIUM BROMIDE 0.5 MG: 0.5 SOLUTION RESPIRATORY (INHALATION) at 02:46

## 2021-11-14 RX ADMIN — INSULIN GLARGINE 20 UNITS: 100 INJECTION, SOLUTION SUBCUTANEOUS at 21:00

## 2021-11-14 RX ADMIN — FUROSEMIDE 60 MG: 10 INJECTION, SOLUTION INTRAMUSCULAR; INTRAVENOUS at 08:32

## 2021-11-14 RX ADMIN — POTASSIUM CHLORIDE 40 MEQ: 1500 TABLET, EXTENDED RELEASE ORAL at 08:26

## 2021-11-14 RX ADMIN — INSULIN LISPRO 4 UNITS: 100 INJECTION, SOLUTION INTRAVENOUS; SUBCUTANEOUS at 17:30

## 2021-11-14 RX ADMIN — Medication 1 SPRAY: at 11:45

## 2021-11-14 RX ADMIN — IPRATROPIUM BROMIDE 0.5 MG: 0.5 SOLUTION RESPIRATORY (INHALATION) at 07:02

## 2021-11-14 RX ADMIN — FUROSEMIDE 60 MG: 10 INJECTION, SOLUTION INTRAMUSCULAR; INTRAVENOUS at 16:59

## 2021-11-14 RX ADMIN — IPRATROPIUM BROMIDE 0.5 MG: 0.5 SOLUTION RESPIRATORY (INHALATION) at 20:02

## 2021-11-14 RX ADMIN — ENOXAPARIN SODIUM 40 MG: 40 INJECTION SUBCUTANEOUS at 08:29

## 2021-11-14 RX ADMIN — Medication 1 SPRAY: at 07:30

## 2021-11-14 RX ADMIN — Medication 1 SPRAY: at 15:02

## 2021-11-14 RX ADMIN — ATORVASTATIN CALCIUM 10 MG: 10 TABLET, FILM COATED ORAL at 16:58

## 2021-11-14 RX ADMIN — LEVALBUTEROL HYDROCHLORIDE 1.25 MG: 1.25 SOLUTION, CONCENTRATE RESPIRATORY (INHALATION) at 20:02

## 2021-11-14 RX ADMIN — FLUTICASONE FUROATE AND VILANTEROL TRIFENATATE 1 PUFF: 200; 25 POWDER RESPIRATORY (INHALATION) at 08:35

## 2021-11-14 RX ADMIN — LEVALBUTEROL HYDROCHLORIDE 1.25 MG: 1.25 SOLUTION, CONCENTRATE RESPIRATORY (INHALATION) at 07:02

## 2021-11-14 RX ADMIN — IPRATROPIUM BROMIDE 0.5 MG: 0.5 SOLUTION RESPIRATORY (INHALATION) at 13:10

## 2021-11-14 RX ADMIN — METHYLPREDNISOLONE SODIUM SUCCINATE 40 MG: 40 INJECTION, POWDER, FOR SOLUTION INTRAMUSCULAR; INTRAVENOUS at 08:30

## 2021-11-14 RX ADMIN — LEVALBUTEROL HYDROCHLORIDE 1.25 MG: 1.25 SOLUTION, CONCENTRATE RESPIRATORY (INHALATION) at 02:46

## 2021-11-14 RX ADMIN — INSULIN LISPRO 6 UNITS: 100 INJECTION, SOLUTION INTRAVENOUS; SUBCUTANEOUS at 12:25

## 2021-11-14 RX ADMIN — ENOXAPARIN SODIUM 40 MG: 40 INJECTION SUBCUTANEOUS at 21:00

## 2021-11-15 ENCOUNTER — TRANSITIONAL CARE MANAGEMENT (OUTPATIENT)
Dept: FAMILY MEDICINE CLINIC | Facility: CLINIC | Age: 56
End: 2021-11-15

## 2021-11-15 VITALS
DIASTOLIC BLOOD PRESSURE: 80 MMHG | OXYGEN SATURATION: 98 % | BODY MASS INDEX: 41.75 KG/M2 | SYSTOLIC BLOOD PRESSURE: 136 MMHG | HEART RATE: 91 BPM | RESPIRATION RATE: 18 BRPM | TEMPERATURE: 97.6 F | WEIGHT: 315 LBS | HEIGHT: 73 IN

## 2021-11-15 LAB
ANION GAP SERPL CALCULATED.3IONS-SCNC: 6 MMOL/L (ref 4–13)
BUN SERPL-MCNC: 28 MG/DL (ref 5–25)
CALCIUM SERPL-MCNC: 8.6 MG/DL (ref 8.3–10.1)
CHLORIDE SERPL-SCNC: 101 MMOL/L (ref 100–108)
CO2 SERPL-SCNC: 29 MMOL/L (ref 21–32)
CREAT SERPL-MCNC: 1.34 MG/DL (ref 0.6–1.3)
GFR SERPL CREATININE-BSD FRML MDRD: 60 ML/MIN/1.73SQ M
GLUCOSE SERPL-MCNC: 117 MG/DL (ref 65–140)
GLUCOSE SERPL-MCNC: 119 MG/DL (ref 65–140)
GLUCOSE SERPL-MCNC: 185 MG/DL (ref 65–140)
POTASSIUM SERPL-SCNC: 3.4 MMOL/L (ref 3.5–5.3)
SODIUM SERPL-SCNC: 136 MMOL/L (ref 136–145)

## 2021-11-15 PROCEDURE — 99232 SBSQ HOSP IP/OBS MODERATE 35: CPT | Performed by: INTERNAL MEDICINE

## 2021-11-15 PROCEDURE — 82948 REAGENT STRIP/BLOOD GLUCOSE: CPT

## 2021-11-15 PROCEDURE — 94640 AIRWAY INHALATION TREATMENT: CPT

## 2021-11-15 PROCEDURE — 80048 BASIC METABOLIC PNL TOTAL CA: CPT | Performed by: NURSE PRACTITIONER

## 2021-11-15 PROCEDURE — 99239 HOSP IP/OBS DSCHRG MGMT >30: CPT | Performed by: NURSE PRACTITIONER

## 2021-11-15 PROCEDURE — 94760 N-INVAS EAR/PLS OXIMETRY 1: CPT

## 2021-11-15 RX ORDER — FUROSEMIDE 20 MG/1
30 TABLET ORAL 2 TIMES DAILY
Qty: 30 TABLET | Refills: 0 | Status: SHIPPED | OUTPATIENT
Start: 2021-11-15 | End: 2021-12-13 | Stop reason: SDUPTHER

## 2021-11-15 RX ORDER — ECHINACEA PURPUREA EXTRACT 125 MG
1 TABLET ORAL 3 TIMES DAILY
Qty: 30 ML | Refills: 0 | Status: SHIPPED | OUTPATIENT
Start: 2021-11-15 | End: 2022-08-03

## 2021-11-15 RX ORDER — PREDNISONE 20 MG/1
TABLET ORAL
Qty: 21 TABLET | Refills: 0 | Status: SHIPPED | OUTPATIENT
Start: 2021-11-16 | End: 2021-11-28

## 2021-11-15 RX ORDER — GUAIFENESIN 600 MG
600 TABLET, EXTENDED RELEASE 12 HR ORAL 2 TIMES DAILY
Qty: 60 TABLET | Refills: 0 | Status: SHIPPED | OUTPATIENT
Start: 2021-11-15 | End: 2022-08-03

## 2021-11-15 RX ORDER — POTASSIUM CHLORIDE 20 MEQ/1
40 TABLET, EXTENDED RELEASE ORAL ONCE
Status: COMPLETED | OUTPATIENT
Start: 2021-11-15 | End: 2021-11-15

## 2021-11-15 RX ADMIN — FLUTICASONE FUROATE AND VILANTEROL TRIFENATATE 1 PUFF: 200; 25 POWDER RESPIRATORY (INHALATION) at 08:24

## 2021-11-15 RX ADMIN — IPRATROPIUM BROMIDE 0.5 MG: 0.5 SOLUTION RESPIRATORY (INHALATION) at 08:54

## 2021-11-15 RX ADMIN — ENOXAPARIN SODIUM 40 MG: 40 INJECTION SUBCUTANEOUS at 08:26

## 2021-11-15 RX ADMIN — GUAIFENESIN 600 MG: 600 TABLET, EXTENDED RELEASE ORAL at 08:25

## 2021-11-15 RX ADMIN — LEVALBUTEROL HYDROCHLORIDE 1.25 MG: 1.25 SOLUTION, CONCENTRATE RESPIRATORY (INHALATION) at 01:27

## 2021-11-15 RX ADMIN — POTASSIUM CHLORIDE 40 MEQ: 1500 TABLET, EXTENDED RELEASE ORAL at 10:35

## 2021-11-15 RX ADMIN — Medication 1 SPRAY: at 08:26

## 2021-11-15 RX ADMIN — FUROSEMIDE 60 MG: 10 INJECTION, SOLUTION INTRAMUSCULAR; INTRAVENOUS at 08:25

## 2021-11-15 RX ADMIN — PREDNISONE 40 MG: 20 TABLET ORAL at 08:25

## 2021-11-15 RX ADMIN — IPRATROPIUM BROMIDE 0.5 MG: 0.5 SOLUTION RESPIRATORY (INHALATION) at 01:27

## 2021-11-15 RX ADMIN — LEVALBUTEROL HYDROCHLORIDE 1.25 MG: 1.25 SOLUTION, CONCENTRATE RESPIRATORY (INHALATION) at 08:54

## 2021-11-15 RX ADMIN — INSULIN LISPRO 2 UNITS: 100 INJECTION, SOLUTION INTRAVENOUS; SUBCUTANEOUS at 12:02

## 2021-11-16 ENCOUNTER — OFFICE VISIT (OUTPATIENT)
Dept: FAMILY MEDICINE CLINIC | Facility: CLINIC | Age: 56
End: 2021-11-16
Payer: COMMERCIAL

## 2021-11-16 VITALS
BODY MASS INDEX: 41.75 KG/M2 | OXYGEN SATURATION: 97 % | SYSTOLIC BLOOD PRESSURE: 122 MMHG | HEIGHT: 73 IN | TEMPERATURE: 98.1 F | DIASTOLIC BLOOD PRESSURE: 80 MMHG | WEIGHT: 315 LBS | HEART RATE: 84 BPM | RESPIRATION RATE: 14 BRPM

## 2021-11-16 DIAGNOSIS — I50.33 ACUTE ON CHRONIC DIASTOLIC HEART FAILURE (HCC): ICD-10-CM

## 2021-11-16 DIAGNOSIS — I42.9 CARDIOMYOPATHY, UNSPECIFIED TYPE (HCC): ICD-10-CM

## 2021-11-16 DIAGNOSIS — E11.65 TYPE 2 DIABETES MELLITUS WITH HYPERGLYCEMIA, WITHOUT LONG-TERM CURRENT USE OF INSULIN (HCC): ICD-10-CM

## 2021-11-16 DIAGNOSIS — E78.2 MIXED HYPERLIPIDEMIA: ICD-10-CM

## 2021-11-16 DIAGNOSIS — E87.6 HYPOKALEMIA: ICD-10-CM

## 2021-11-16 DIAGNOSIS — N17.9 ACUTE RENAL FAILURE, UNSPECIFIED ACUTE RENAL FAILURE TYPE (HCC): ICD-10-CM

## 2021-11-16 DIAGNOSIS — J45.41 MODERATE PERSISTENT ASTHMA WITH ACUTE EXACERBATION: Primary | ICD-10-CM

## 2021-11-16 DIAGNOSIS — I10 ESSENTIAL HYPERTENSION: ICD-10-CM

## 2021-11-16 PROCEDURE — 99496 TRANSJ CARE MGMT HIGH F2F 7D: CPT | Performed by: FAMILY MEDICINE

## 2021-11-16 PROCEDURE — 1111F DSCHRG MED/CURRENT MED MERGE: CPT | Performed by: FAMILY MEDICINE

## 2021-11-16 RX ORDER — POTASSIUM CHLORIDE 750 MG/1
10 TABLET, EXTENDED RELEASE ORAL DAILY
Qty: 30 TABLET | Refills: 3 | Status: SHIPPED | OUTPATIENT
Start: 2021-11-16 | End: 2021-12-16

## 2021-11-16 RX ORDER — LANCETS
EACH MISCELLANEOUS
Qty: 200 EACH | Refills: 3 | Status: SHIPPED | OUTPATIENT
Start: 2021-11-16

## 2021-11-19 ENCOUNTER — OFFICE VISIT (OUTPATIENT)
Dept: CARDIOLOGY CLINIC | Facility: CLINIC | Age: 56
End: 2021-11-19
Payer: COMMERCIAL

## 2021-11-19 VITALS
BODY MASS INDEX: 41.75 KG/M2 | SYSTOLIC BLOOD PRESSURE: 136 MMHG | DIASTOLIC BLOOD PRESSURE: 80 MMHG | OXYGEN SATURATION: 97 % | HEIGHT: 73 IN | HEART RATE: 100 BPM | WEIGHT: 315 LBS

## 2021-11-19 DIAGNOSIS — I50.9 HEART FAILURE, UNSPECIFIED HF CHRONICITY, UNSPECIFIED HEART FAILURE TYPE (HCC): Primary | ICD-10-CM

## 2021-11-19 PROCEDURE — 3079F DIAST BP 80-89 MM HG: CPT | Performed by: NURSE PRACTITIONER

## 2021-11-19 PROCEDURE — 1111F DSCHRG MED/CURRENT MED MERGE: CPT | Performed by: NURSE PRACTITIONER

## 2021-11-19 PROCEDURE — 99215 OFFICE O/P EST HI 40 MIN: CPT | Performed by: NURSE PRACTITIONER

## 2021-11-19 PROCEDURE — 3075F SYST BP GE 130 - 139MM HG: CPT | Performed by: NURSE PRACTITIONER

## 2021-11-21 DIAGNOSIS — J45.20 MILD INTERMITTENT ASTHMA WITHOUT COMPLICATION: ICD-10-CM

## 2021-11-21 DIAGNOSIS — E11.65 TYPE 2 DIABETES MELLITUS WITH HYPERGLYCEMIA, WITHOUT LONG-TERM CURRENT USE OF INSULIN (HCC): ICD-10-CM

## 2021-11-22 ENCOUNTER — OFFICE VISIT (OUTPATIENT)
Dept: PULMONOLOGY | Facility: CLINIC | Age: 56
End: 2021-11-22
Payer: COMMERCIAL

## 2021-11-22 VITALS
HEART RATE: 83 BPM | HEIGHT: 73 IN | WEIGHT: 315 LBS | RESPIRATION RATE: 16 BRPM | BODY MASS INDEX: 41.75 KG/M2 | SYSTOLIC BLOOD PRESSURE: 144 MMHG | TEMPERATURE: 97.9 F | OXYGEN SATURATION: 97 % | DIASTOLIC BLOOD PRESSURE: 80 MMHG

## 2021-11-22 DIAGNOSIS — R06.00 DYSPNEA ON EXERTION: ICD-10-CM

## 2021-11-22 DIAGNOSIS — G47.33 OSA (OBSTRUCTIVE SLEEP APNEA): ICD-10-CM

## 2021-11-22 DIAGNOSIS — J45.41 MODERATE PERSISTENT ASTHMA WITH ACUTE EXACERBATION: Primary | ICD-10-CM

## 2021-11-22 DIAGNOSIS — I50.33 ACUTE ON CHRONIC DIASTOLIC HEART FAILURE (HCC): ICD-10-CM

## 2021-11-22 PROCEDURE — 99214 OFFICE O/P EST MOD 30 MIN: CPT | Performed by: INTERNAL MEDICINE

## 2021-11-22 PROCEDURE — 3008F BODY MASS INDEX DOCD: CPT | Performed by: INTERNAL MEDICINE

## 2021-11-22 RX ORDER — CEPHALEXIN 500 MG/1
CAPSULE ORAL
COMMUNITY
Start: 2021-11-19 | End: 2022-01-01 | Stop reason: HOSPADM

## 2021-11-22 RX ORDER — TIOTROPIUM BROMIDE INHALATION SPRAY 1.56 UG/1
2 SPRAY, METERED RESPIRATORY (INHALATION) DAILY
Qty: 4 G | Refills: 3 | Status: SHIPPED | OUTPATIENT
Start: 2021-11-22 | End: 2022-02-03 | Stop reason: SDUPTHER

## 2021-11-22 RX ORDER — ALBUTEROL SULFATE 90 UG/1
AEROSOL, METERED RESPIRATORY (INHALATION)
Qty: 8 G | Refills: 3 | Status: SHIPPED | OUTPATIENT
Start: 2021-11-22 | End: 2022-02-28 | Stop reason: SDUPTHER

## 2021-11-22 RX ORDER — SITAGLIPTIN 100 MG/1
TABLET, FILM COATED ORAL
Qty: 30 TABLET | Refills: 1 | Status: SHIPPED | OUTPATIENT
Start: 2021-11-22 | End: 2022-01-24

## 2021-12-13 DIAGNOSIS — J45.901 ASTHMA EXACERBATION: ICD-10-CM

## 2021-12-13 RX ORDER — FUROSEMIDE 20 MG/1
30 TABLET ORAL 2 TIMES DAILY
Qty: 30 TABLET | Refills: 0 | Status: SHIPPED | OUTPATIENT
Start: 2021-12-13 | End: 2021-12-16

## 2021-12-16 ENCOUNTER — OFFICE VISIT (OUTPATIENT)
Dept: CARDIOLOGY CLINIC | Facility: CLINIC | Age: 56
End: 2021-12-16
Payer: COMMERCIAL

## 2021-12-16 VITALS
WEIGHT: 315 LBS | BODY MASS INDEX: 44.73 KG/M2 | OXYGEN SATURATION: 90 % | SYSTOLIC BLOOD PRESSURE: 156 MMHG | DIASTOLIC BLOOD PRESSURE: 76 MMHG | HEART RATE: 77 BPM

## 2021-12-16 DIAGNOSIS — R60.0 BILATERAL LEG EDEMA: ICD-10-CM

## 2021-12-16 DIAGNOSIS — E87.6 HYPOKALEMIA: ICD-10-CM

## 2021-12-16 DIAGNOSIS — I42.9 CARDIOMYOPATHY, UNSPECIFIED TYPE (HCC): ICD-10-CM

## 2021-12-16 PROCEDURE — 3078F DIAST BP <80 MM HG: CPT | Performed by: NURSE PRACTITIONER

## 2021-12-16 PROCEDURE — 99215 OFFICE O/P EST HI 40 MIN: CPT | Performed by: NURSE PRACTITIONER

## 2021-12-16 PROCEDURE — 3077F SYST BP >= 140 MM HG: CPT | Performed by: NURSE PRACTITIONER

## 2021-12-16 RX ORDER — POTASSIUM CHLORIDE 750 MG/1
20 TABLET, EXTENDED RELEASE ORAL 2 TIMES DAILY
Qty: 30 TABLET | Refills: 3 | Status: SHIPPED | OUTPATIENT
Start: 2021-12-16 | End: 2022-02-03 | Stop reason: SDUPTHER

## 2021-12-16 RX ORDER — FUROSEMIDE 40 MG/1
40 TABLET ORAL 2 TIMES DAILY
Qty: 30 TABLET | Refills: 5 | Status: SHIPPED | OUTPATIENT
Start: 2021-12-16 | End: 2021-12-22

## 2021-12-21 ENCOUNTER — TELEPHONE (OUTPATIENT)
Dept: CARDIOLOGY CLINIC | Facility: CLINIC | Age: 56
End: 2021-12-21

## 2021-12-22 DIAGNOSIS — I50.30 DIASTOLIC CONGESTIVE HEART FAILURE, UNSPECIFIED HF CHRONICITY (HCC): Primary | ICD-10-CM

## 2021-12-22 RX ORDER — TORSEMIDE 20 MG/1
20 TABLET ORAL 2 TIMES DAILY
Qty: 60 TABLET | Refills: 2 | Status: SHIPPED | OUTPATIENT
Start: 2021-12-22 | End: 2022-01-11 | Stop reason: SDUPTHER

## 2021-12-23 ENCOUNTER — HOSPITAL ENCOUNTER (INPATIENT)
Facility: HOSPITAL | Age: 56
LOS: 9 days | Discharge: HOME | DRG: 291 | End: 2022-01-01
Attending: EMERGENCY MEDICINE | Admitting: INTERNAL MEDICINE
Payer: COMMERCIAL

## 2021-12-23 ENCOUNTER — APPOINTMENT (EMERGENCY)
Dept: RADIOLOGY | Facility: HOSPITAL | Age: 56
DRG: 291 | End: 2021-12-23
Payer: COMMERCIAL

## 2021-12-23 DIAGNOSIS — J45.901 ASTHMA EXACERBATION: ICD-10-CM

## 2021-12-23 DIAGNOSIS — I50.33 ACUTE ON CHRONIC DIASTOLIC HEART FAILURE (HCC): Primary | ICD-10-CM

## 2021-12-23 PROBLEM — R06.02 SOB (SHORTNESS OF BREATH): Status: ACTIVE | Noted: 2021-12-23

## 2021-12-23 PROBLEM — N18.30 STAGE 3 CHRONIC KIDNEY DISEASE (HCC): Status: ACTIVE | Noted: 2021-12-23

## 2021-12-23 LAB
2HR DELTA HS TROPONIN: -1 NG/L
ANION GAP SERPL CALCULATED.3IONS-SCNC: 7 MMOL/L (ref 4–13)
ATRIAL RATE: 105 BPM
BASOPHILS # BLD AUTO: 0.1 THOUSANDS/ΜL (ref 0–0.1)
BASOPHILS NFR BLD AUTO: 1 % (ref 0–1)
BUN SERPL-MCNC: 16 MG/DL (ref 5–25)
CALCIUM SERPL-MCNC: 8.3 MG/DL (ref 8.3–10.1)
CARDIAC TROPONIN I PNL SERPL HS: 19 NG/L
CARDIAC TROPONIN I PNL SERPL HS: 20 NG/L
CHLORIDE SERPL-SCNC: 96 MMOL/L (ref 100–108)
CO2 SERPL-SCNC: 31 MMOL/L (ref 21–32)
CREAT SERPL-MCNC: 1.52 MG/DL (ref 0.6–1.3)
EOSINOPHIL # BLD AUTO: 0.85 THOUSAND/ΜL (ref 0–0.61)
EOSINOPHIL NFR BLD AUTO: 9 % (ref 0–6)
ERYTHROCYTE [DISTWIDTH] IN BLOOD BY AUTOMATED COUNT: 13.4 % (ref 11.6–15.1)
FLUAV RNA RESP QL NAA+PROBE: NEGATIVE
FLUBV RNA RESP QL NAA+PROBE: NEGATIVE
GFR SERPL CREATININE-BSD FRML MDRD: 51 ML/MIN/1.73SQ M
GLUCOSE SERPL-MCNC: 187 MG/DL (ref 65–140)
GLUCOSE SERPL-MCNC: 366 MG/DL (ref 65–140)
GLUCOSE SERPL-MCNC: 375 MG/DL (ref 65–140)
HCT VFR BLD AUTO: 39.8 % (ref 36.5–49.3)
HGB BLD-MCNC: 13.1 G/DL (ref 12–17)
IMM GRANULOCYTES # BLD AUTO: 0.07 THOUSAND/UL (ref 0–0.2)
IMM GRANULOCYTES NFR BLD AUTO: 1 % (ref 0–2)
LYMPHOCYTES # BLD AUTO: 0.9 THOUSANDS/ΜL (ref 0.6–4.47)
LYMPHOCYTES NFR BLD AUTO: 9 % (ref 14–44)
MCH RBC QN AUTO: 30.3 PG (ref 26.8–34.3)
MCHC RBC AUTO-ENTMCNC: 32.9 G/DL (ref 31.4–37.4)
MCV RBC AUTO: 92 FL (ref 82–98)
MONOCYTES # BLD AUTO: 0.79 THOUSAND/ΜL (ref 0.17–1.22)
MONOCYTES NFR BLD AUTO: 8 % (ref 4–12)
NEUTROPHILS # BLD AUTO: 7.16 THOUSANDS/ΜL (ref 1.85–7.62)
NEUTS SEG NFR BLD AUTO: 72 % (ref 43–75)
NRBC BLD AUTO-RTO: 0 /100 WBCS
P AXIS: 89 DEGREES
PLATELET # BLD AUTO: 263 THOUSANDS/UL (ref 149–390)
PMV BLD AUTO: 10.1 FL (ref 8.9–12.7)
POTASSIUM SERPL-SCNC: 3.7 MMOL/L (ref 3.5–5.3)
PR INTERVAL: 126 MS
QRS AXIS: 71 DEGREES
QRSD INTERVAL: 80 MS
QT INTERVAL: 346 MS
QTC INTERVAL: 457 MS
RBC # BLD AUTO: 4.33 MILLION/UL (ref 3.88–5.62)
RSV RNA RESP QL NAA+PROBE: NEGATIVE
SARS-COV-2 RNA RESP QL NAA+PROBE: NEGATIVE
SODIUM SERPL-SCNC: 134 MMOL/L (ref 136–145)
T WAVE AXIS: 92 DEGREES
VENTRICULAR RATE: 105 BPM
WBC # BLD AUTO: 9.87 THOUSAND/UL (ref 4.31–10.16)

## 2021-12-23 PROCEDURE — 93005 ELECTROCARDIOGRAM TRACING: CPT

## 2021-12-23 PROCEDURE — 99223 1ST HOSP IP/OBS HIGH 75: CPT | Performed by: INTERNAL MEDICINE

## 2021-12-23 PROCEDURE — 96374 THER/PROPH/DIAG INJ IV PUSH: CPT

## 2021-12-23 PROCEDURE — 85025 COMPLETE CBC W/AUTO DIFF WBC: CPT

## 2021-12-23 PROCEDURE — 93010 ELECTROCARDIOGRAM REPORT: CPT | Performed by: INTERNAL MEDICINE

## 2021-12-23 PROCEDURE — 82948 REAGENT STRIP/BLOOD GLUCOSE: CPT

## 2021-12-23 PROCEDURE — 94640 AIRWAY INHALATION TREATMENT: CPT

## 2021-12-23 PROCEDURE — 94664 DEMO&/EVAL PT USE INHALER: CPT

## 2021-12-23 PROCEDURE — 94760 N-INVAS EAR/PLS OXIMETRY 1: CPT

## 2021-12-23 PROCEDURE — 99285 EMERGENCY DEPT VISIT HI MDM: CPT | Performed by: EMERGENCY MEDICINE

## 2021-12-23 PROCEDURE — 80048 BASIC METABOLIC PNL TOTAL CA: CPT

## 2021-12-23 PROCEDURE — 36415 COLL VENOUS BLD VENIPUNCTURE: CPT

## 2021-12-23 PROCEDURE — 84484 ASSAY OF TROPONIN QUANT: CPT

## 2021-12-23 PROCEDURE — 0241U HB NFCT DS VIR RESP RNA 4 TRGT: CPT

## 2021-12-23 PROCEDURE — 99285 EMERGENCY DEPT VISIT HI MDM: CPT

## 2021-12-23 PROCEDURE — 71045 X-RAY EXAM CHEST 1 VIEW: CPT

## 2021-12-23 RX ORDER — BUDESONIDE AND FORMOTEROL FUMARATE DIHYDRATE 160; 4.5 UG/1; UG/1
2 AEROSOL RESPIRATORY (INHALATION) 2 TIMES DAILY
Status: DISCONTINUED | OUTPATIENT
Start: 2021-12-23 | End: 2022-01-01 | Stop reason: HOSPADM

## 2021-12-23 RX ORDER — ATORVASTATIN CALCIUM 10 MG/1
10 TABLET, FILM COATED ORAL
Status: DISCONTINUED | OUTPATIENT
Start: 2021-12-23 | End: 2022-01-01 | Stop reason: HOSPADM

## 2021-12-23 RX ORDER — MONTELUKAST SODIUM 10 MG/1
10 TABLET ORAL
Status: DISCONTINUED | OUTPATIENT
Start: 2021-12-23 | End: 2022-01-01 | Stop reason: HOSPADM

## 2021-12-23 RX ORDER — ACETAMINOPHEN 325 MG/1
650 TABLET ORAL EVERY 6 HOURS PRN
Status: DISCONTINUED | OUTPATIENT
Start: 2021-12-23 | End: 2022-01-01 | Stop reason: HOSPADM

## 2021-12-23 RX ORDER — LEVALBUTEROL 1.25 MG/.5ML
1.25 SOLUTION, CONCENTRATE RESPIRATORY (INHALATION)
Status: DISCONTINUED | OUTPATIENT
Start: 2021-12-23 | End: 2022-01-01 | Stop reason: HOSPADM

## 2021-12-23 RX ORDER — HEPARIN SODIUM 5000 [USP'U]/ML
5000 INJECTION, SOLUTION INTRAVENOUS; SUBCUTANEOUS EVERY 8 HOURS SCHEDULED
Status: DISCONTINUED | OUTPATIENT
Start: 2021-12-23 | End: 2021-12-23

## 2021-12-23 RX ORDER — METHYLPREDNISOLONE SODIUM SUCCINATE 40 MG/ML
40 INJECTION, POWDER, LYOPHILIZED, FOR SOLUTION INTRAMUSCULAR; INTRAVENOUS EVERY 8 HOURS SCHEDULED
Status: DISCONTINUED | OUTPATIENT
Start: 2021-12-23 | End: 2021-12-24

## 2021-12-23 RX ORDER — INSULIN GLARGINE 100 [IU]/ML
10 INJECTION, SOLUTION SUBCUTANEOUS
Status: DISCONTINUED | OUTPATIENT
Start: 2021-12-23 | End: 2021-12-24

## 2021-12-23 RX ORDER — MAGNESIUM SULFATE HEPTAHYDRATE 40 MG/ML
2 INJECTION, SOLUTION INTRAVENOUS ONCE
Status: COMPLETED | OUTPATIENT
Start: 2021-12-23 | End: 2021-12-23

## 2021-12-23 RX ORDER — LEVALBUTEROL 1.25 MG/.5ML
1.25 SOLUTION, CONCENTRATE RESPIRATORY (INHALATION) 3 TIMES DAILY
Status: DISCONTINUED | OUTPATIENT
Start: 2021-12-23 | End: 2021-12-23

## 2021-12-23 RX ORDER — ECHINACEA PURPUREA EXTRACT 125 MG
1 TABLET ORAL
Status: DISCONTINUED | OUTPATIENT
Start: 2021-12-23 | End: 2022-01-01 | Stop reason: HOSPADM

## 2021-12-23 RX ORDER — GUAIFENESIN 600 MG
600 TABLET, EXTENDED RELEASE 12 HR ORAL 2 TIMES DAILY
Status: DISCONTINUED | OUTPATIENT
Start: 2021-12-23 | End: 2022-01-01 | Stop reason: HOSPADM

## 2021-12-23 RX ORDER — FUROSEMIDE 10 MG/ML
60 INJECTION INTRAMUSCULAR; INTRAVENOUS
Status: DISCONTINUED | OUTPATIENT
Start: 2021-12-23 | End: 2021-12-26

## 2021-12-23 RX ORDER — LEVALBUTEROL INHALATION SOLUTION 1.25 MG/3ML
1.25 SOLUTION RESPIRATORY (INHALATION) 3 TIMES DAILY
Status: DISCONTINUED | OUTPATIENT
Start: 2021-12-23 | End: 2021-12-23 | Stop reason: SDUPTHER

## 2021-12-23 RX ORDER — HEPARIN SODIUM 5000 [USP'U]/ML
7500 INJECTION, SOLUTION INTRAVENOUS; SUBCUTANEOUS EVERY 8 HOURS SCHEDULED
Status: DISCONTINUED | OUTPATIENT
Start: 2021-12-23 | End: 2022-01-01 | Stop reason: HOSPADM

## 2021-12-23 RX ADMIN — BUDESONIDE AND FORMOTEROL FUMARATE DIHYDRATE 2 PUFF: 160; 4.5 AEROSOL RESPIRATORY (INHALATION) at 18:13

## 2021-12-23 RX ADMIN — MAGNESIUM SULFATE HEPTAHYDRATE 2 G: 40 INJECTION, SOLUTION INTRAVENOUS at 08:11

## 2021-12-23 RX ADMIN — IPRATROPIUM BROMIDE 0.5 MG: 0.5 SOLUTION RESPIRATORY (INHALATION) at 06:31

## 2021-12-23 RX ADMIN — METHYLPREDNISOLONE SODIUM SUCCINATE 40 MG: 40 INJECTION, POWDER, FOR SOLUTION INTRAMUSCULAR; INTRAVENOUS at 13:27

## 2021-12-23 RX ADMIN — FUROSEMIDE 60 MG: 10 INJECTION, SOLUTION INTRAMUSCULAR; INTRAVENOUS at 18:15

## 2021-12-23 RX ADMIN — MONTELUKAST 10 MG: 10 TABLET, FILM COATED ORAL at 21:54

## 2021-12-23 RX ADMIN — IPRATROPIUM BROMIDE 0.5 MG: 0.5 SOLUTION RESPIRATORY (INHALATION) at 07:54

## 2021-12-23 RX ADMIN — HEPARIN SODIUM 7500 UNITS: 5000 INJECTION INTRAVENOUS; SUBCUTANEOUS at 21:50

## 2021-12-23 RX ADMIN — BUDESONIDE AND FORMOTEROL FUMARATE DIHYDRATE 2 PUFF: 160; 4.5 AEROSOL RESPIRATORY (INHALATION) at 13:22

## 2021-12-23 RX ADMIN — PREDNISONE 50 MG: 20 TABLET ORAL at 08:11

## 2021-12-23 RX ADMIN — INSULIN LISPRO 10 UNITS: 100 INJECTION, SOLUTION INTRAVENOUS; SUBCUTANEOUS at 18:14

## 2021-12-23 RX ADMIN — LEVALBUTEROL HYDROCHLORIDE 1.25 MG: 1.25 SOLUTION, CONCENTRATE RESPIRATORY (INHALATION) at 19:17

## 2021-12-23 RX ADMIN — INSULIN GLARGINE 10 UNITS: 100 INJECTION, SOLUTION SUBCUTANEOUS at 21:54

## 2021-12-23 RX ADMIN — IPRATROPIUM BROMIDE 0.5 MG: 0.5 SOLUTION RESPIRATORY (INHALATION) at 19:17

## 2021-12-23 RX ADMIN — GUAIFENESIN 600 MG: 600 TABLET, EXTENDED RELEASE ORAL at 13:27

## 2021-12-23 RX ADMIN — IPRATROPIUM BROMIDE 0.5 MG: 0.5 SOLUTION RESPIRATORY (INHALATION) at 13:32

## 2021-12-23 RX ADMIN — ALBUTEROL SULFATE 5 MG: 2.5 SOLUTION RESPIRATORY (INHALATION) at 06:31

## 2021-12-23 RX ADMIN — LEVALBUTEROL HYDROCHLORIDE 1.25 MG: 1.25 SOLUTION, CONCENTRATE RESPIRATORY (INHALATION) at 13:31

## 2021-12-23 RX ADMIN — ATORVASTATIN CALCIUM 10 MG: 10 TABLET, FILM COATED ORAL at 18:13

## 2021-12-23 RX ADMIN — HEPARIN SODIUM 7500 UNITS: 5000 INJECTION INTRAVENOUS; SUBCUTANEOUS at 13:24

## 2021-12-23 RX ADMIN — ALBUTEROL SULFATE 5 MG: 2.5 SOLUTION RESPIRATORY (INHALATION) at 07:54

## 2021-12-23 RX ADMIN — GUAIFENESIN 600 MG: 600 TABLET, EXTENDED RELEASE ORAL at 18:13

## 2021-12-23 RX ADMIN — METHYLPREDNISOLONE SODIUM SUCCINATE 40 MG: 40 INJECTION, POWDER, FOR SOLUTION INTRAMUSCULAR; INTRAVENOUS at 21:51

## 2021-12-23 NOTE — MALNUTRITION/BMI
This medical record reflects one or more clinical indicators suggestive of morbid obesity  BMI Findings:  Adult BMI Classifications: Morbid Obesity 40-44 9     Body mass index is 44 74 kg/m²  See Nutrition note dated 12/23/2021 for additional details  Completed nutrition assessment is viewable in the nutrition documentation

## 2021-12-23 NOTE — RESPIRATORY THERAPY NOTE
RT Protocol Note  Kit Hernandez 49 y o  male MRN: [id number]  Unit/Bed#: CW2 218-01 Encounter: [id number]    Assessment    Active Problems:    * No active hospital problems  *      Home Pulmonary Medications:    Home Devices/Therapy:  (Albuterol MDI prn)    Past Medical History:   Diagnosis Date    Acute gastritis without hemorrhage     last assessed 3/24/17    Asthma     Diabetes mellitus ([address] )     Gastric bypass status for obesity     Hyperlipidemia     Hypertension     Impaired fasting glucose     last assessed 5/10/17    Obesity     Viral gastroenteritis     last assessed 11/4/16     Social History     Socioeconomic History    Marital status: /Civil Union     Spouse name: None    Number of children: None    Years of education: None    Highest education level: None   Occupational History    None   Tobacco Use    Smoking status: Light Tobacco Smoker     Types: Cigars    Smokeless tobacco: Current User    Tobacco comment: rare/hasn't smoked in 1 year   Vaping Use    Vaping Use: Never used   Substance and Sexual Activity    Alcohol use:  Yes     Alcohol/week: 1 0 standard drink     Types: 1 Standard drinks or equivalent per week     Comment: social    Drug use: No    Sexual activity: Yes     Partners: Female     Birth control/protection: None   Other Topics Concern    None   Social History Narrative    None     Social Determinants of Health     Financial Resource Strain: Not on file   Food Insecurity: Not on file   Transportation Needs: Not on file   Physical Activity: Not on file   Stress: Not on file   Social Connections: Not on file   Intimate Partner Violence: Not on file   Housing Stability: Not on file       Subjective         Objective    Physical Exam:   Assessment Type: Assess only  General Appearance: Alert,Awake  Respiratory Pattern: Normal  Chest Assessment: Chest expansion symmetrical  Bilateral Breath Sounds: Diminished (sl ex wheeze)  Cough: None  O2 Device: nc    Vitals:  Blood pressure 163/86, pulse 102, temperature 97 8 °F (36 6 °C), resp  rate 18, SpO2 (!) 89 %  Imaging and other studies: I have personally reviewed pertinent reports        O2 Device: nc     Plan    Respiratory Plan: Mild Distress pathway        Resp Comments: (P) patient assessed for respiratory protocol, DX: SOB,HX: asthma CXR-10/23/21- lungs clear, pos smoking hx-cigars, light tobacco smoker pt appears to be in no distress at this time, he states he takes Albuterol MDI prn at home but has needed it more frequently the past few days, he was given a udn in ED and feels very little difference pre/post neb, althoough he would like to continue UDN tx's at this time and then reassess in 24-48 hours, he is sitting up in bed at this time, , will continue Xopenex 1 25mg/0 5mg Atrovent TID

## 2021-12-23 NOTE — ASSESSMENT & PLAN NOTE
· Patient is a 28-year-old male with a past medical history of chronic diastolic heart failure, moderate persistent asthma, obstructive sleep apnea, hyperlipidemia, type 2 diabetes, hypertension who presents with worsening dyspnea on exertion, lower extremity edema and wheezing  Patient had multiple admissions for CHF exacerbation triggered by asthma exacerbation  Last admission in November  In November he was discharged with Lasix 40 mg in the morning and 30 mg in the evening  Seen by cardiology last week his Lasix was increased to 60 mg b i d  For 2 days and then continue 40 mg b i d  Without any improvement  · Suspect shortness of breath secondary to CHF exacerbation asthma exacerbation  · Patient had good response to Lasix IV 60 mg b i d   In November, will start the same  · Daily weights, intake and output, fluid restriction  · For asthma exacerbation start Solu-Medrol 40 mg q 8 hours, breathing treatments, respiratory protocol

## 2021-12-23 NOTE — H&P
[facility]  H&P- Drema Root 1965, 49 y o  male MRN: [id number]  Unit/Bed#: CW2 218-01 Encounter: [id number]  Primary Care Provider: Lizzie Britton MD   Date and time admitted to hospital: 12/23/2021  6:07 AM    * SOB (shortness of breath)  Assessment & Plan  · Patient is a 57-year-old male with a past medical history of chronic diastolic heart failure, moderate persistent asthma, obstructive sleep apnea, hyperlipidemia, type 2 diabetes, hypertension who presents with worsening dyspnea on exertion, lower extremity edema and wheezing  Patient had multiple admissions for CHF exacerbation triggered by asthma exacerbation  Last admission in November  In November he was discharged with Lasix 40 mg in the morning and 30 mg in the evening  Seen by cardiology last week his Lasix was increased to 60 mg b i d  For 2 days and then continue 40 mg b i d  Without any improvement  · Suspect shortness of breath secondary to CHF exacerbation asthma exacerbation  · Patient had good response to Lasix IV 60 mg b i d  In November, will start the same  · Daily weights, intake and output, fluid restriction  · For asthma exacerbation start Solu-Medrol 40 mg q 8 hours, breathing treatments, respiratory protocol    Acute on chronic diastolic heart failure [facility])  Assessment & Plan  Wt Readings from Last 3 Encounters:   12/16/21 (!) 154 kg (339 lb)   11/22/21 (!) 158 kg (348 lb)   11/19/21 (!) 156 kg (344 lb 6 4 oz)       · Echo 11/12/21 showed ejection fraction 55% and grade 1 diastolic dysfunction  · Current home regimen is Lasix 40 mg b i d  Which was recently increased by Cardiology    · Start Lasix 60 mg IV b i d , he had good response on his previous admission today's dose  · Intake and output, daily weights, fluid restriction      Moderate persistent asthma with acute exacerbation  Assessment & Plan  · Most recent exacerbation in November when he was discharged on a steroid taper  · Patient complains of increased shortness of breath with wheezing, used his nebulizers multiple times a day without improvement  · Start Solu-Medrol 40 mg q 8 hours, nebulizer treatments  · Respiratory protocol    Stage 3 chronic kidney disease [facility])  Assessment & Plan  Lab Results   Component Value Date    EGFR 51 12/23/2021    EGFR 60 11/15/2021    EGFR 60 11/14/2021    CREATININE 1 52 (H) 12/23/2021    CREATININE 1 34 (H) 11/15/2021    CREATININE 1 33 (H) 11/14/2021   · Patient presented multiple times to the hospital for CHF an asthma exacerbation and ANTONIO  His baseline creatinine seems to be from 1 1-1 5  · Creatinine today 1 52  · Monitor on diuretics  · Avoid nephrotoxic    VICKY (obstructive sleep apnea)  Assessment & Plan  · Patient cannot tolerate hospital CPAP    Mixed hyperlipidemia  Assessment & Plan  · Continue Lipitor 10 mg daily    Type 2 diabetes mellitus with hyperglycemia (HCC)  Assessment & Plan  Lab Results   Component Value Date    HGBA1C 7 0 (H) 11/10/2021       No results for input(s): POCGLU in the last 72 hours  Blood Sugar Average: Last 72 hrs:  ·  on his previous admission he had steroid induced hyperglycemia  · Were going to start Lantus 10 units HS today with sliding scale, adjust tomorrow if needed    Essential hypertension  Assessment & Plan  · Continue to monitor on Lasix    VTE Pharmacologic Prophylaxis: VTE Score: 4 Moderate Risk (Score 3-4) - Pharmacological DVT Prophylaxis Ordered: heparin  Code Status: Level 1 - Full Code   Discussion with family: Patient declined call to   Anticipated Length of Stay: Patient will be admitted on an inpatient basis with an anticipated length of stay of greater than 2 midnights secondary to Heart failure after exacerbation  Total Time for Visit, including Counseling / Coordination of Care: 60 minutes Greater than 50% of this total time spent on direct patient counseling and coordination of care      Chief Complaint: Shortness of breath    History of Present Illness:  Ben Carnes is a 49 y o  male with a PMH of chronic diastolic heart failure, moderate persistent asthma, obstructive sleep apnea, hyperlipidemia, type 2 diabetes, hypertension who presents with shortness of breath  Patient was admitted in November for asthma exacerbation and CHF exacerbation  He was discharged with a prednisone taper in a and Lasix 40 mg in AM and added 30 mg in the evening  Patient followed up with Cardiology last before increased shortness of breath and leg edema  His Lasix was increased to 60 mg b i d  For 2 days and then continue 40 mg b i d   Patient developed increased dyspnea on exertion, lower extremity edema, expiratory wheezing, he has been using his nebulizers multiple times a day without any improvement  His urine output was low  In the ED he was found initially to be hypoxic on room air id 9% and placed on 2 L of oxygen, in the meanwhile weaned off oxygen  COVID/flu/RSV negative  Chest x-ray negative for acute cardiopulmonary disease  Upon my evaluation patient is awake alert and oriented, denies visual changes, headache, chest pain palpitation shortness of breath at rest, nausea vomiting abdominal pain       Review of Systems:  Review of Systems all reviewed and negative except as above    Past Medical and Surgical History:   Past Medical History:   Diagnosis Date    Acute gastritis without hemorrhage     last assessed 3/24/17    Asthma     Diabetes mellitus [address] [address] )     Gastric bypass status for obesity     Hyperlipidemia     Hypertension     Impaired fasting glucose     last assessed 5/10/17    Obesity     Viral gastroenteritis     last assessed 11/4/16       Past Surgical History:   Procedure Laterality Date    CARPAL TUNNEL RELEASE      bilateral    GASTRIC BYPASS  2004    with han en y   [id number]      ventral inscisional    TONSILLECTOMY         Meds/Allergies:  Prior to Admission medications Medication Sig Start Date End Date Taking?  Authorizing Provider   albuterol (PROVENTIL HFA,VENTOLIN HFA) 90 mcg/act inhaler TAKE 2 PUFFS BY MOUTH EVERY 4 HOURS AS NEEDED FOR WHEEZE 11/22/21  Yes Garrison Dawson MD   atorvastatin (LIPITOR) 10 mg tablet TAKE 1 TABLET BY MOUTH EVERY DAY 9/10/21  Yes Garrison Dawson MD   budesonide-formoterol (Symbicort) 160-4 5 mcg/act inhaler Inhale 2 puffs 2 (two) times a day Rinse mouth after use  7/22/21  Yes Iker Maradiaga DO   guaiFENesin (MUCINEX) 600 mg 12 hr tablet Take 1 tablet (600 mg total) by mouth 2 (two) times a day 11/15/21  Yes RODRIGUE Ballesteros   Januvia 100 MG tablet TAKE 1 TABLET BY MOUTH EVERY DAY 11/22/21  Yes Garrison Dawson MD   levalbuterol (Xopenex) 1 25 mg/3 mL nebulizer solution Take 3 mL (1 25 mg total) by nebulization 3 (three) times a day 11/1/21  Yes RODRIGUE Yap   metFORMIN (GLUCOPHAGE) 500 mg tablet Take 1 tablet (500 mg total) by mouth 2 (two) times a day After meals 8/2/21  Yes Garrison Dawson MD   MULTIPLE VITAMINS-CALCIUM PO Take 1 tablet by mouth daily 2/23/16  Yes Historical Provider, MD   potassium chloride (K-DUR,KLOR-CON) 10 mEq tablet Take 2 tablets (20 mEq total) by mouth 2 (two) times a day 12/16/21  Yes RODRIGUE Rodriges   sodium chloride (OCEAN) 0 65 % nasal spray 1 spray into each nostril 3 (three) times a day 11/15/21  Yes RODRIGUE Ballesteros   tiotropium (Spiriva Respimat) 1 25 MCG/ACT AERS inhaler Inhale 2 puffs daily 11/22/21  Yes Juanjose Martin MD   torsemide (DEMADEX) 20 mg tablet Take 1 tablet (20 mg total) by mouth 2 (two) times a day 12/22/21  Yes RODRIGUE Rodriges   Accu-Chek FastClix Lancets MISC Test blood sugar twice daily 11/16/21   Garrison Dawson MD   Blood Glucose Monitoring Suppl (Accu-Chek Guide) w/Device KIT Use 2 (two) times a day Test blood sugar twice daily 8/5/21   Garrison Dawson MD   cephalexin (KEFLEX) 500 mg capsule  11/19/21   Historical Provider, MD   glucose blood (Accu-Chek Guide) test strip Test blood sugar twice daily 9/29/21   Mazin Johnston MD   montelukast (SINGULAIR) 10 mg tablet Take 1 tablet (10 mg total) by mouth daily at bedtime 7/22/21 12/16/21  Demi Brands, DO     I have reviewed home medications with patient personally  Allergies: Allergies   Allergen Reactions    Azithromycin Other (See Comments)     Shaky, uneasy feeling     Bactrim [Sulfamethoxazole-Trimethoprim] Other (See Comments)     shakiness       Social History:  Marital Status: /Civil Union   Substance Use History:   Social History     Substance and Sexual Activity   Alcohol Use Yes    Alcohol/week: 1 0 standard drink    Types: 1 Standard drinks or equivalent per week    Comment: social     Social History     Tobacco Use   Smoking Status Light Tobacco Smoker    Types: Cigars   Smokeless Tobacco Current User   Tobacco Comment    rare/hasn't smoked in 1 year     Social History     Substance and Sexual Activity   Drug Use No       Family History:  Family History   Problem Relation Age of Onset    Stroke Mother     Hypertension Father        Physical Exam:     Vitals:   Blood Pressure: 163/86 (12/23/21 0931)  Pulse: 102 (12/23/21 0715)  Temperature: 97 8 °F (36 6 °C) (12/23/21 0931)  Temp Source: Oral (12/23/21 0612)  Respirations: 18 (12/23/21 0931)  SpO2: (!) 89 % (12/23/21 0715)    Physical Exam  Constitutional:       General: He is not in acute distress  HENT:      Head: Atraumatic  Cardiovascular:      Rate and Rhythm: Normal rate and regular rhythm  Heart sounds: No murmur heard  No friction rub  No gallop  Pulmonary:      Effort: Pulmonary effort is normal       Breath sounds: Wheezing present  Abdominal:      General: Bowel sounds are normal  There is no distension  Palpations: Abdomen is soft  Tenderness: There is no abdominal tenderness  Musculoskeletal:      Cervical back: Neck supple        Right lower leg: Edema present  Left lower leg: Edema present  Skin:     General: Skin is warm and dry  Neurological:      General: No focal deficit present  Mental Status: He is alert  Psychiatric:         Mood and Affect: Mood normal           Additional Data:     Lab Results:  Results from last 7 days   Lab Units 12/23/21  0630   WBC Thousand/uL 9 87   HEMOGLOBIN g/dL 13 1   HEMATOCRIT % 39 8   PLATELETS Thousands/uL 263   NEUTROS PCT % 72   LYMPHS PCT % 9*   MONOS PCT % 8   EOS PCT % 9*     Results from last 7 days   Lab Units 12/23/21  0630   SODIUM mmol/L 134*   POTASSIUM mmol/L 3 7   CHLORIDE mmol/L 96*   CO2 mmol/L 31   BUN mg/dL 16   CREATININE mg/dL 1 52*   ANION GAP mmol/L 7   CALCIUM mg/dL 8 3   GLUCOSE RANDOM mg/dL 187*                       Imaging: Reviewed radiology reports from this admission including: chest xray  XR chest portable   ED Interpretation by Emily Ochoa MD (12/23 7817)   No focal infiltrate      Final Result by Vasyl Styles MD (12/23 8305)      No acute cardiopulmonary disease  Workstation performed: [id number]             EKG and Other Studies Reviewed on Admission:   · EKG: NSR  HR Sinus tacy, rate 105  ** Please Note: This note has been constructed using a voice recognition system   **

## 2021-12-23 NOTE — ED PROVIDER NOTES
History  Chief Complaint   Patient presents with    Shortness of Breath     pt brought in by [facility] has been SOB for 1x week, lasix has not been helping w/ a CHF & asthma history      57-year-old man with relevant past med history of asthma and CHF presents with 1 week of worsening dyspnea on exertion and bilateral pedal edema  He saw his cardiologist last week and his Lasix was increased to 40 b i d  He has been using his rescue inhaler the last week with minimal improvement of his symptoms  He has been admitted multiple times in the last few months for CHF exacerbation  He has been having a cough with productive sputum  He is reporting some mild substernal chest pain  No recent fevers, chills, sore throat, abdominal pain, vomiting, dysuria, or change in bowel habits  No recent sick contacts  Prior to Admission Medications   Prescriptions Last Dose Informant Patient Reported? Taking? Accu-Chek FastClix Lancets MISC  Self No No   Sig: Test blood sugar twice daily   Blood Glucose Monitoring Suppl (Accu-Chek Guide) w/Device KIT  Self No No   Sig: Use 2 (two) times a day Test blood sugar twice daily   Januvia 100 MG tablet  Self No No   Sig: TAKE 1 TABLET BY MOUTH EVERY DAY   MULTIPLE VITAMINS-CALCIUM PO  Self Yes No   Sig: Take 1 tablet by mouth daily   albuterol (PROVENTIL HFA,VENTOLIN HFA) 90 mcg/act inhaler  Self No No   Sig: TAKE 2 PUFFS BY MOUTH EVERY 4 HOURS AS NEEDED FOR WHEEZE   atorvastatin (LIPITOR) 10 mg tablet  Self No No   Sig: TAKE 1 TABLET BY MOUTH EVERY DAY   budesonide-formoterol (Symbicort) 160-4 5 mcg/act inhaler  Self No No   Sig: Inhale 2 puffs 2 (two) times a day Rinse mouth after use     cephalexin (KEFLEX) 500 mg capsule  Self Yes No   glucose blood (Accu-Chek Guide) test strip  Self No No   Sig: Test blood sugar twice daily   guaiFENesin (MUCINEX) 600 mg 12 hr tablet  Self No No   Sig: Take 1 tablet (600 mg total) by mouth 2 (two) times a day   levalbuterol (Xopenex) 1 25 mg/3 mL nebulizer solution  Self No No   Sig: Take 3 mL (1 25 mg total) by nebulization 3 (three) times a day   metFORMIN (GLUCOPHAGE) 500 mg tablet  Self No No   Sig: Take 1 tablet (500 mg total) by mouth 2 (two) times a day After meals   montelukast (SINGULAIR) 10 mg tablet  Self No No   Sig: Take 1 tablet (10 mg total) by mouth daily at bedtime   potassium chloride (K-DUR,KLOR-CON) 10 mEq tablet   No No   Sig: Take 2 tablets (20 mEq total) by mouth 2 (two) times a day   sodium chloride (OCEAN) 0 65 % nasal spray  Self No No   Si spray into each nostril 3 (three) times a day   tiotropium (Spiriva Respimat) 1 25 MCG/ACT AERS inhaler  Self No No   Sig: Inhale 2 puffs daily   torsemide (DEMADEX) 20 mg tablet   No No   Sig: Take 1 tablet (20 mg total) by mouth 2 (two) times a day      Facility-Administered Medications: None       Past Medical History:   Diagnosis Date    Acute gastritis without hemorrhage     last assessed 3/24/17    Asthma     Diabetes mellitus ([facility] [address] )     Gastric bypass status for obesity     Hyperlipidemia     Hypertension     Impaired fasting glucose     last assessed 5/10/17    Obesity     Viral gastroenteritis     last assessed 16       Past Surgical History:   Procedure Laterality Date    CARPAL TUNNEL RELEASE      bilateral    GASTRIC BYPASS      with han en y    HERNIA REPAIR      ventral inscisional    TONSILLECTOMY         Family History   Problem Relation Age of Onset    Stroke Mother     Hypertension Father      I have reviewed and agree with the history as documented      E-Cigarette/Vaping    E-Cigarette Use Never User      E-Cigarette/Vaping Substances    Nicotine No     THC No     CBD No     Flavoring No     Other No     Unknown No      Social History     Tobacco Use    Smoking status: Light Tobacco Smoker     Types: Cigars    Smokeless tobacco: Current User    Tobacco comment: rare/hasn't smoked in 1 year   Vaping Use    Vaping Use: Never used Substance Use Topics    Alcohol use: Yes     Alcohol/week: 1 0 standard drink     Types: 1 Standard drinks or equivalent per week     Comment: social    Drug use: No        Review of Systems   Constitutional: Negative for chills and fever  HENT: Negative for ear pain and sore throat  Eyes: Negative for pain and visual disturbance  Respiratory: Negative for cough and shortness of breath  Cardiovascular: Negative for chest pain and palpitations  Gastrointestinal: Negative for abdominal pain, constipation, diarrhea and vomiting  Genitourinary: Negative for dysuria and hematuria  Musculoskeletal: Negative for arthralgias and back pain  Skin: Negative for color change and rash  Neurological: Negative for seizures, syncope and light-headedness  Psychiatric/Behavioral: Negative for agitation and confusion  All other systems reviewed and are negative  Physical Exam  ED Triage Vitals   Temperature Pulse Respirations Blood Pressure SpO2   12/23/21 0612 12/23/21 0612 12/23/21 0612 12/23/21 0612 12/23/21 0613   98 3 °F (36 8 °C) (!) 108 20 (!) 179/106 93 %      Temp Source Heart Rate Source Patient Position - Orthostatic VS BP Location FiO2 (%)   12/23/21 0612 12/23/21 0612 12/23/21 0612 12/23/21 0612 --   Oral Monitor Sitting Right arm       Pain Score       --                    Orthostatic Vital Signs  Vitals:    12/23/21 0612 12/23/21 0715   BP: (!) 179/106    Pulse: (!) 108 102   Patient Position - Orthostatic VS: Sitting        Physical Exam  Vitals and nursing note reviewed  Constitutional:       General: He is not in acute distress  Appearance: Normal appearance  He is well-developed  HENT:      Head: Normocephalic and atraumatic  Right Ear: External ear normal       Left Ear: External ear normal    Eyes:      Conjunctiva/sclera: Conjunctivae normal    Cardiovascular:      Rate and Rhythm: Normal rate and regular rhythm  Heart sounds: No murmur heard        Pulmonary: Effort: Pulmonary effort is normal  No respiratory distress  Breath sounds: Examination of the right-upper field reveals wheezing  Examination of the left-upper field reveals wheezing  Examination of the right-middle field reveals wheezing  Examination of the left-middle field reveals wheezing  Examination of the right-lower field reveals wheezing  Examination of the left-lower field reveals wheezing  Wheezing present  No rales  Abdominal:      Palpations: Abdomen is soft  Tenderness: There is no abdominal tenderness  There is no guarding or rebound  Musculoskeletal:         General: No swelling  Normal range of motion  Cervical back: Normal range of motion and neck supple  Right lower leg: Edema present  Left lower leg: Edema present  Skin:     General: Skin is warm and dry  Neurological:      General: No focal deficit present  Mental Status: He is alert and oriented to person, place, and time     Psychiatric:         Mood and Affect: Mood normal          Behavior: Behavior normal          ED Medications  Medications   albuterol inhalation solution 5 mg (5 mg Nebulization Given 12/23/21 0631)   ipratropium (ATROVENT) 0 02 % inhalation solution 0 5 mg (0 5 mg Nebulization Given 12/23/21 0631)   ipratropium (ATROVENT) 0 02 % inhalation solution 0 5 mg (0 5 mg Nebulization Given 12/23/21 0754)   albuterol inhalation solution 5 mg (5 mg Nebulization Given 12/23/21 0754)   predniSONE tablet 50 mg (50 mg Oral Given 12/23/21 0811)   magnesium sulfate 2 g/50 mL IVPB (premix) 2 g (2 g Intravenous New Bag 12/23/21 0811)       Diagnostic Studies  Results Reviewed     Procedure Component Value Units Date/Time    COVID/FLU/RSV [[id number]]  (Normal) Collected: 12/23/21 0637    Lab Status: Final result Specimen: Nasopharyngeal Swab Updated: 12/23/21 0814     SARS-CoV-2 Negative     INFLUENZA A PCR Negative     INFLUENZA B PCR Negative     RSV PCR Negative    Narrative:           HS Troponin I 4hr [[id number]]     Lab Status: No result Specimen: Blood     HS Troponin 0hr (reflex protocol) [[id number]]  (Normal) Collected: 12/23/21 0630    Lab Status: Final result Specimen: Blood from Arm, Left Updated: 12/23/21 0707     hs TnI 0hr 20 ng/L     HS Troponin I 2hr [[id number]]     Lab Status: No result Specimen: Blood     Basic metabolic panel [[id number]]  (Abnormal) Collected: 12/23/21 0630    Lab Status: Final result Specimen: Blood from Arm, Left Updated: 12/23/21 0657     Sodium 134 mmol/L      Potassium 3 7 mmol/L      Chloride 96 mmol/L      CO2 31 mmol/L      ANION GAP 7 mmol/L      BUN 16 mg/dL      Creatinine 1 52 mg/dL      Glucose 187 mg/dL      Calcium 8 3 mg/dL      eGFR 51 ml/min/1 73sq m     Narrative:      [place] guidelines for Chronic Kidney Disease (CKD):     Stage 1 with normal or high GFR (GFR > 90 mL/min/1 73 square meters)    Stage 2 Mild CKD (GFR = 60-89 mL/min/1 73 square meters)    Stage 3A Moderate CKD (GFR = 45-59 mL/min/1 73 square meters)    Stage 3B Moderate CKD (GFR = 30-44 mL/min/1 73 square meters)    Stage 4 Severe CKD (GFR = 15-29 mL/min/1 73 square meters)    Stage 5 End Stage CKD (GFR <15 mL/min/1 73 square meters)  Note: GFR calculation is accurate only with a steady state creatinine    CBC and differential [[id number]]  (Abnormal) Collected: 12/23/21 0630    Lab Status: Final result Specimen: Blood from Arm, Left Updated: 12/23/21 0650     WBC 9 87 Thousand/uL      RBC 4 33 Million/uL      Hemoglobin 13 1 g/dL      Hematocrit 39 8 %      MCV 92 fL      MCH 30 3 pg      MCHC 32 9 g/dL      RDW 13 4 %      MPV 10 1 fL      Platelets 760 Thousands/uL      nRBC 0 /100 WBCs      Neutrophils Relative 72 %      Immat GRANS % 1 %      Lymphocytes Relative 9 %      Monocytes Relative 8 %      Eosinophils Relative 9 %      Basophils Relative 1 %      Neutrophils Absolute 7 16 Thousands/µL      Immature Grans Absolute 0 07 Thousand/uL Lymphocytes Absolute 0 90 Thousands/µL      Monocytes Absolute 0 79 Thousand/µL      Eosinophils Absolute 0 85 Thousand/µL      Basophils Absolute 0 10 Thousands/µL                  XR chest portable   ED Interpretation by Rachelle Malhotra MD (12/23 2141)   No focal infiltrate      Final Result by Nghia Cruz MD (12/23 2227)      No acute cardiopulmonary disease  Workstation performed: [id number]               Procedures  ECG 12 Lead Documentation Only    Date/Time: 12/23/2021 8:28 AM  Performed by: Rachel Metcalf MD  Authorized by: Rachel Metcalf MD     Indications / Diagnosis:  Dyspnea  ECG reviewed by me, the ED Provider: yes    Patient location:  ED  Previous ECG:     Previous ECG:  Compared to current    Comparison ECG info:  11/9/21    Similarity:  Changes noted    Comparison to cardiac monitor: Yes    Interpretation:     Interpretation: abnormal    Rate:     ECG rate:  105    ECG rate assessment: tachycardic    Rhythm:     Rhythm: sinus rhythm    Ectopy:     Ectopy: none    QRS:     QRS axis:  Normal    QRS intervals:  Normal  Conduction:     Conduction: normal    ST segments:     ST segments:  Normal  T waves:     T waves: normal            ED Course                             SBIRT 22yo+      Most Recent Value   SBIRT (22 yo +)    In order to provide better care to our patients, we are screening all of our patients for alcohol and drug use  Would it be okay to ask you these screening questions? Yes Filed at: 12/23/2021 9524   Initial Alcohol Screen: US AUDIT-C     1  How often do you have a drink containing alcohol? 1 Filed at: 12/23/2021 0614   2  How many drinks containing alcohol do you have on a typical day you are drinking? 2 Filed at: 12/23/2021 0614   3a  Male UNDER 65: How often do you have five or more drinks on one occasion? 2 Filed at: 12/23/2021 1193   Audit-C Score 5 Filed at: 12/23/2021 1451   TASH: How many times in the past year have you        Used an illegal drug or used a prescription medication for non-medical reasons? Never Filed at: 12/23/2021 8422                [facility]  Number of Diagnoses or Management Options  Asthma exacerbation  Diagnosis management comments: Symptoms seem consistent with possible asthma or CHF exacerbation  He does report some increased cough with sputum production, which may contribute to an asthma exacerbation  He is diffusely wheezing bilaterally  Chest radiograph shows no signs of fluid overload  Ordered 2 nebulizer treatments, magnesium, and steroids with minor improvement of his symptoms  No evidence of ACS with unremarkable ECG and negative troponin  Slight bump in baseline creatinine  After discussion with the patient, shared decision made for inpatient treatment and observation of dyspnea  Patient in agreement with plan and questions were answered  Verbalized understanding of return precautions  Portions or all of this note were generated using voice recognition software  Occasional wrong word or "sound a like" substitutions may have occurred due to the inherent limitations of voice recognition software  Please interpret any errors within the intended context of the whole sentence or idea  Disposition  Final diagnoses:   Asthma exacerbation     Time reflects when diagnosis was documented in both MDM as applicable and the Disposition within this note     Time User Action Codes Description Comment    12/23/2021  8:20 AM Karrie Hudson Add [L88 491] Asthma exacerbation       ED Disposition     ED Disposition Condition Date/Time Comment    Admit Stable Thu Dec 23, 2021  8:20 AM Case was discussed with ADEN and the patient's admission status was agreed to be Admission Status: inpatient status to the service of Dr Rahat Matias  Follow-up Information    None         Patient's Medications   Discharge Prescriptions    No medications on file     No discharge procedures on file      PDMP Review       Value Time User PDMP Reviewed  Yes 9/23/2021 12:07 AM [place] DO Gisselle           ED Provider  Attending physically available and evaluated [address]  I managed the patient along with the ED Attending      Electronically Signed by         Luna Escobar MD  12/23/21 2328

## 2021-12-23 NOTE — ED ATTENDING ATTESTATION
12/23/2021  IAlfred MD, saw and evaluated the patient  I have discussed the patient with the resident/non-physician practitioner and agree with the resident's/non-physician practitioner's findings, Plan of Care, and MDM as documented in the resident's/non-physician practitioner's note, except where noted  All available labs and Radiology studies were reviewed  I was present for key portions of any procedure(s) performed by the resident/non-physician practitioner and I was immediately available to provide assistance  At this point I agree with the current assessment done in the Emergency Department  I have conducted an independent evaluation of this patient a history and physical is as follows:    ED Course      Emergency Department Note- Jonathon Jj 49 y o  male MRN: [id number]    Unit/Bed#: CRB Encounter: [id number]    Jonathon Jj is a 49 y o  male who presents with   Chief Complaint   Patient presents with    Shortness of Breath     pt brought in by [facility] has been SOB for 1x week, lasix has not been helping w/ a CHF & asthma history          History of Present Illness   HPI:  Jonathon Jj is a 49 y o  male who presents for evaluation of:  Progressive dyspnea for weeks and got much worse this am  Patient had a recent increase in his furosemide dose to treat his CHF without relief of symptoms  Patient also has a h/o asthma  Patient has been vaccinated against covid  Patient notes that any sort of activity provokes his dyspnea  Review of Systems   Constitutional: Negative for chills and fever  HENT: Positive for congestion and rhinorrhea  Respiratory: Positive for cough, chest tightness and shortness of breath  Cardiovascular: Positive for leg swelling  Negative for chest pain and palpitations  Gastrointestinal: Negative for nausea and vomiting  Neurological: Negative for light-headedness and headaches  All other systems reviewed and are negative        Historical Information Past Medical History:   Diagnosis Date    Acute gastritis without hemorrhage     last assessed 3/24/17    Asthma     Diabetes mellitus ([facility] [address] )     Gastric bypass status for obesity     Hyperlipidemia     Hypertension     Impaired fasting glucose     last assessed 5/10/17    Obesity     Viral gastroenteritis     last assessed 11/4/16     Past Surgical History:   Procedure Laterality Date    CARPAL TUNNEL RELEASE      bilateral    GASTRIC BYPASS  2004    with han en y    HERNIA REPAIR      ventral inscisional    TONSILLECTOMY       Social History   Social History     Substance and Sexual Activity   Alcohol Use Yes    Alcohol/week: 1 0 standard drink    Types: 1 Standard drinks or equivalent per week    Comment: social     Social History     Substance and Sexual Activity   Drug Use No     Social History     Tobacco Use   Smoking Status Light Tobacco Smoker    Types: Cigars   Smokeless Tobacco Current User   Tobacco Comment    rare/hasn't smoked in 1 year     Family History:   Family History   Problem Relation Age of Onset    Stroke Mother     Hypertension Father        Meds/Allergies   PTA meds:   Prior to Admission Medications   Prescriptions Last Dose Informant Patient Reported? Taking?    Accu-Chek FastClix Lancets MISC  Self No No   Sig: Test blood sugar twice daily   Blood Glucose Monitoring Suppl (Accu-Chek Guide) w/Device KIT  Self No No   Sig: Use 2 (two) times a day Test blood sugar twice daily   Januvia 100 MG tablet  Self No No   Sig: TAKE 1 TABLET BY MOUTH EVERY DAY   MULTIPLE VITAMINS-CALCIUM PO  Self Yes No   Sig: Take 1 tablet by mouth daily   albuterol (PROVENTIL HFA,VENTOLIN HFA) 90 mcg/act inhaler  Self No No   Sig: TAKE 2 PUFFS BY MOUTH EVERY 4 HOURS AS NEEDED FOR WHEEZE   atorvastatin (LIPITOR) 10 mg tablet  Self No No   Sig: TAKE 1 TABLET BY MOUTH EVERY DAY   budesonide-formoterol (Symbicort) 160-4 5 mcg/act inhaler  Self No No   Sig: Inhale 2 puffs 2 (two) times a day Rinse mouth after use     cephalexin (KEFLEX) 500 mg capsule  Self Yes No   glucose blood (Accu-Chek Guide) test strip  Self No No   Sig: Test blood sugar twice daily   guaiFENesin (MUCINEX) 600 mg 12 hr tablet  Self No No   Sig: Take 1 tablet (600 mg total) by mouth 2 (two) times a day   levalbuterol (Xopenex) 1 25 mg/3 mL nebulizer solution  Self No No   Sig: Take 3 mL (1 25 mg total) by nebulization 3 (three) times a day   metFORMIN (GLUCOPHAGE) 500 mg tablet  Self No No   Sig: Take 1 tablet (500 mg total) by mouth 2 (two) times a day After meals   montelukast (SINGULAIR) 10 mg tablet  Self No No   Sig: Take 1 tablet (10 mg total) by mouth daily at bedtime   potassium chloride (K-DUR,KLOR-CON) 10 mEq tablet   No No   Sig: Take 2 tablets (20 mEq total) by mouth 2 (two) times a day   sodium chloride (OCEAN) 0 65 % nasal spray  Self No No   Si spray into each nostril 3 (three) times a day   tiotropium (Spiriva Respimat) 1 25 MCG/ACT AERS inhaler  Self No No   Sig: Inhale 2 puffs daily   torsemide (DEMADEX) 20 mg tablet   No No   Sig: Take 1 tablet (20 mg total) by mouth 2 (two) times a day      Facility-Administered Medications: None     Allergies   Allergen Reactions    Azithromycin Other (See Comments)     Shaky, uneasy feeling     Bactrim [Sulfamethoxazole-Trimethoprim] Other (See Comments)     shakiness       Objective   First Vitals:   Blood Pressure: (!) 179/106 (21)  Pulse: (!) 108 (21)  Temperature: 98 3 °F (36 8 °C) (21)  Temp Source: Oral (21)  Respirations: 20 (21)  SpO2: 93 % (21)    Current Vitals:   Blood Pressure: (!) 179/106 (21)  Pulse: (!) 108 (21)  Temperature: 98 3 °F (36 8 °C) (21)  Temp Source: Oral (21)  Respirations: 20 (21)  SpO2: 93 % (21)    No intake or output data in the 24 hours ending 21    Invasive Devices  Report    Peripheral Intravenous Line Peripheral IV 21 Left Arm <1 day                Physical Exam  Vitals and nursing note reviewed  Constitutional:       General: He is not in acute distress  Appearance: Normal appearance  He is well-developed  HENT:      Head: Normocephalic and atraumatic  Right Ear: External ear normal       Left Ear: External ear normal       Nose: Nose normal       Mouth/Throat:      Pharynx: No oropharyngeal exudate  Eyes:      Conjunctiva/sclera: Conjunctivae normal       Pupils: Pupils are equal, round, and reactive to light  Cardiovascular:      Rate and Rhythm: Normal rate and regular rhythm  Pulmonary:      Effort: Pulmonary effort is normal  No respiratory distress  Breath sounds: Examination of the right-upper field reveals wheezing and rhonchi  Examination of the left-upper field reveals wheezing and rhonchi  Examination of the right-middle field reveals wheezing and rhonchi  Examination of the left-middle field reveals wheezing and rhonchi  Examination of the right-lower field reveals wheezing and rhonchi  Examination of the left-lower field reveals wheezing and rhonchi  Wheezing and rhonchi present  Abdominal:      General: Abdomen is flat  There is no distension  Musculoskeletal:         General: No deformity  Normal range of motion  Cervical back: Normal range of motion and neck supple  Skin:     General: Skin is warm and dry  Capillary Refill: Capillary refill takes less than 2 seconds  Neurological:      General: No focal deficit present  Mental Status: He is alert and oriented to person, place, and time  Mental status is at baseline  Coordination: Coordination normal    Psychiatric:         Mood and Affect: Mood normal          Behavior: Behavior normal          Thought Content: Thought content normal          Judgment: Judgment normal            Medical Decision Makin  Acute dyspnea: CXR; BNP;  Tn; ECG; CMP; albuterol iprotropium nebulization; if evidence of CHF on CXR diuretics    No results found for this or any previous visit (from the past 36 hour(s))  XR chest 2 views    (Results Pending)         Portions of the record may have been created with voice recognition software  Occasional wrong word or "sound a like" substitutions may have occurred due to the inherent limitations of voice recognition software  Read the chart carefully and recognize, using context, where substitutions have occurred            Critical Care Time  Procedures

## 2021-12-23 NOTE — ASSESSMENT & PLAN NOTE
Wt Readings from Last 3 Encounters:   12/16/21 (!) 154 kg (339 lb)   11/22/21 (!) 158 kg (348 lb)   11/19/21 (!) 156 kg (344 lb 6 4 oz)       · Echo 11/12/21 showed ejection fraction 55% and grade 1 diastolic dysfunction  · Current home regimen is Lasix 40 mg b i d  Which was recently increased by Cardiology    · Start Lasix 60 mg IV b i d , he had good response on his previous admission today's dose  · Intake and output, daily weights, fluid restriction

## 2021-12-23 NOTE — ASSESSMENT & PLAN NOTE
· Most recent exacerbation in November when he was discharged on a steroid taper  · Patient complains of increased shortness of breath with wheezing, used his nebulizers multiple times a day without improvement    · Start Solu-Medrol 40 mg q 8 hours, nebulizer treatments  · Respiratory protocol

## 2021-12-23 NOTE — ASSESSMENT & PLAN NOTE
Lab Results   Component Value Date    HGBA1C 7 0 (H) 11/10/2021       No results for input(s): POCGLU in the last 72 hours      Blood Sugar Average: Last 72 hrs:  ·  on his previous admission he had steroid induced hyperglycemia  · Were going to start Lantus 10 units HS today with sliding scale, adjust tomorrow if needed

## 2021-12-23 NOTE — ED NOTES
Patient reports no improvement after breathing treatment, "feel about the same"  Sp02 noted to be 89%, placed on [facility]        Michelle Dodge RN  12/23/21 5135

## 2021-12-23 NOTE — ASSESSMENT & PLAN NOTE
Lab Results   Component Value Date    EGFR 51 12/23/2021    EGFR 60 11/15/2021    EGFR 60 11/14/2021    CREATININE 1 52 (H) 12/23/2021    CREATININE 1 34 (H) 11/15/2021    CREATININE 1 33 (H) 11/14/2021   · Patient presented multiple times to the hospital for CHF an asthma exacerbation and ANTONIO    His baseline creatinine seems to be from 1 1-1 5  · Creatinine today 1 52  · Monitor on diuretics  · Avoid nephrotoxic

## 2021-12-24 LAB
ANION GAP SERPL CALCULATED.3IONS-SCNC: 7 MMOL/L (ref 4–13)
BASOPHILS # BLD AUTO: 0.02 THOUSANDS/ΜL (ref 0–0.1)
BASOPHILS NFR BLD AUTO: 0 % (ref 0–1)
BUN SERPL-MCNC: 24 MG/DL (ref 5–25)
CALCIUM SERPL-MCNC: 9.2 MG/DL (ref 8.3–10.1)
CHLORIDE SERPL-SCNC: 95 MMOL/L (ref 100–108)
CO2 SERPL-SCNC: 30 MMOL/L (ref 21–32)
CREAT SERPL-MCNC: 1.56 MG/DL (ref 0.6–1.3)
EOSINOPHIL # BLD AUTO: 0.01 THOUSAND/ΜL (ref 0–0.61)
EOSINOPHIL NFR BLD AUTO: 0 % (ref 0–6)
ERYTHROCYTE [DISTWIDTH] IN BLOOD BY AUTOMATED COUNT: 13.4 % (ref 11.6–15.1)
GFR SERPL CREATININE-BSD FRML MDRD: 49 ML/MIN/1.73SQ M
GLUCOSE SERPL-MCNC: 242 MG/DL (ref 65–140)
GLUCOSE SERPL-MCNC: 292 MG/DL (ref 65–140)
GLUCOSE SERPL-MCNC: 296 MG/DL (ref 65–140)
GLUCOSE SERPL-MCNC: 298 MG/DL (ref 65–140)
GLUCOSE SERPL-MCNC: 342 MG/DL (ref 65–140)
HCT VFR BLD AUTO: 38 % (ref 36.5–49.3)
HGB BLD-MCNC: 12.4 G/DL (ref 12–17)
IMM GRANULOCYTES # BLD AUTO: 0.12 THOUSAND/UL (ref 0–0.2)
IMM GRANULOCYTES NFR BLD AUTO: 1 % (ref 0–2)
LYMPHOCYTES # BLD AUTO: 0.57 THOUSANDS/ΜL (ref 0.6–4.47)
LYMPHOCYTES NFR BLD AUTO: 5 % (ref 14–44)
MAGNESIUM SERPL-MCNC: 2.1 MG/DL (ref 1.6–2.6)
MCH RBC QN AUTO: 30.2 PG (ref 26.8–34.3)
MCHC RBC AUTO-ENTMCNC: 32.6 G/DL (ref 31.4–37.4)
MCV RBC AUTO: 93 FL (ref 82–98)
MONOCYTES # BLD AUTO: 0.6 THOUSAND/ΜL (ref 0.17–1.22)
MONOCYTES NFR BLD AUTO: 5 % (ref 4–12)
NEUTROPHILS # BLD AUTO: 9.93 THOUSANDS/ΜL (ref 1.85–7.62)
NEUTS SEG NFR BLD AUTO: 89 % (ref 43–75)
NRBC BLD AUTO-RTO: 0 /100 WBCS
PLATELET # BLD AUTO: 233 THOUSANDS/UL (ref 149–390)
PMV BLD AUTO: 10 FL (ref 8.9–12.7)
POTASSIUM SERPL-SCNC: 3.7 MMOL/L (ref 3.5–5.3)
RBC # BLD AUTO: 4.1 MILLION/UL (ref 3.88–5.62)
SODIUM SERPL-SCNC: 132 MMOL/L (ref 136–145)
WBC # BLD AUTO: 11.25 THOUSAND/UL (ref 4.31–10.16)

## 2021-12-24 PROCEDURE — 94760 N-INVAS EAR/PLS OXIMETRY 1: CPT

## 2021-12-24 PROCEDURE — 85025 COMPLETE CBC W/AUTO DIFF WBC: CPT | Performed by: INTERNAL MEDICINE

## 2021-12-24 PROCEDURE — 82948 REAGENT STRIP/BLOOD GLUCOSE: CPT

## 2021-12-24 PROCEDURE — 94640 AIRWAY INHALATION TREATMENT: CPT

## 2021-12-24 PROCEDURE — 99233 SBSQ HOSP IP/OBS HIGH 50: CPT | Performed by: INTERNAL MEDICINE

## 2021-12-24 PROCEDURE — 83735 ASSAY OF MAGNESIUM: CPT | Performed by: INTERNAL MEDICINE

## 2021-12-24 PROCEDURE — 80048 BASIC METABOLIC PNL TOTAL CA: CPT | Performed by: INTERNAL MEDICINE

## 2021-12-24 RX ORDER — HYDRALAZINE HYDROCHLORIDE 20 MG/ML
5 INJECTION INTRAMUSCULAR; INTRAVENOUS EVERY 6 HOURS PRN
Status: DISCONTINUED | OUTPATIENT
Start: 2021-12-24 | End: 2022-01-01 | Stop reason: HOSPADM

## 2021-12-24 RX ORDER — INSULIN GLARGINE 100 [IU]/ML
12 INJECTION, SOLUTION SUBCUTANEOUS
Status: DISCONTINUED | OUTPATIENT
Start: 2021-12-24 | End: 2021-12-25

## 2021-12-24 RX ADMIN — HEPARIN SODIUM 7500 UNITS: 5000 INJECTION INTRAVENOUS; SUBCUTANEOUS at 05:56

## 2021-12-24 RX ADMIN — HEPARIN SODIUM 7500 UNITS: 5000 INJECTION INTRAVENOUS; SUBCUTANEOUS at 22:02

## 2021-12-24 RX ADMIN — GUAIFENESIN 600 MG: 600 TABLET, EXTENDED RELEASE ORAL at 17:18

## 2021-12-24 RX ADMIN — BUDESONIDE AND FORMOTEROL FUMARATE DIHYDRATE 2 PUFF: 160; 4.5 AEROSOL RESPIRATORY (INHALATION) at 17:19

## 2021-12-24 RX ADMIN — SODIUM CHLORIDE: 9 INJECTION, SOLUTION INTRAVENOUS at 22:02

## 2021-12-24 RX ADMIN — INSULIN LISPRO 6 UNITS: 100 INJECTION, SOLUTION INTRAVENOUS; SUBCUTANEOUS at 17:19

## 2021-12-24 RX ADMIN — IPRATROPIUM BROMIDE 0.5 MG: 0.5 SOLUTION RESPIRATORY (INHALATION) at 07:37

## 2021-12-24 RX ADMIN — LEVALBUTEROL HYDROCHLORIDE 1.25 MG: 1.25 SOLUTION, CONCENTRATE RESPIRATORY (INHALATION) at 07:37

## 2021-12-24 RX ADMIN — HEPARIN SODIUM 7500 UNITS: 5000 INJECTION INTRAVENOUS; SUBCUTANEOUS at 14:50

## 2021-12-24 RX ADMIN — FUROSEMIDE 60 MG: 10 INJECTION, SOLUTION INTRAMUSCULAR; INTRAVENOUS at 08:53

## 2021-12-24 RX ADMIN — INSULIN LISPRO 5 UNITS: 100 INJECTION, SOLUTION INTRAVENOUS; SUBCUTANEOUS at 17:20

## 2021-12-24 RX ADMIN — INSULIN GLARGINE 12 UNITS: 100 INJECTION, SOLUTION SUBCUTANEOUS at 22:07

## 2021-12-24 RX ADMIN — INSULIN LISPRO 8 UNITS: 100 INJECTION, SOLUTION INTRAVENOUS; SUBCUTANEOUS at 12:56

## 2021-12-24 RX ADMIN — GUAIFENESIN 600 MG: 600 TABLET, EXTENDED RELEASE ORAL at 08:53

## 2021-12-24 RX ADMIN — METHYLPREDNISOLONE SODIUM SUCCINATE 40 MG: 40 INJECTION, POWDER, FOR SOLUTION INTRAMUSCULAR; INTRAVENOUS at 06:03

## 2021-12-24 RX ADMIN — SODIUM CHLORIDE: 9 INJECTION, SOLUTION INTRAVENOUS at 14:50

## 2021-12-24 RX ADMIN — FUROSEMIDE 60 MG: 10 INJECTION, SOLUTION INTRAMUSCULAR; INTRAVENOUS at 17:18

## 2021-12-24 RX ADMIN — IPRATROPIUM BROMIDE 0.5 MG: 0.5 SOLUTION RESPIRATORY (INHALATION) at 13:21

## 2021-12-24 RX ADMIN — INSULIN LISPRO 5 UNITS: 100 INJECTION, SOLUTION INTRAVENOUS; SUBCUTANEOUS at 08:55

## 2021-12-24 RX ADMIN — LEVALBUTEROL HYDROCHLORIDE 1.25 MG: 1.25 SOLUTION, CONCENTRATE RESPIRATORY (INHALATION) at 13:21

## 2021-12-24 RX ADMIN — MONTELUKAST 10 MG: 10 TABLET, FILM COATED ORAL at 22:02

## 2021-12-24 RX ADMIN — BUDESONIDE AND FORMOTEROL FUMARATE DIHYDRATE 2 PUFF: 160; 4.5 AEROSOL RESPIRATORY (INHALATION) at 08:52

## 2021-12-24 RX ADMIN — ATORVASTATIN CALCIUM 10 MG: 10 TABLET, FILM COATED ORAL at 17:18

## 2021-12-24 RX ADMIN — LEVALBUTEROL HYDROCHLORIDE 1.25 MG: 1.25 SOLUTION, CONCENTRATE RESPIRATORY (INHALATION) at 20:46

## 2021-12-24 RX ADMIN — IPRATROPIUM BROMIDE 0.5 MG: 0.5 SOLUTION RESPIRATORY (INHALATION) at 20:46

## 2021-12-24 RX ADMIN — INSULIN LISPRO 5 UNITS: 100 INJECTION, SOLUTION INTRAVENOUS; SUBCUTANEOUS at 12:56

## 2021-12-24 RX ADMIN — INSULIN LISPRO 6 UNITS: 100 INJECTION, SOLUTION INTRAVENOUS; SUBCUTANEOUS at 06:00

## 2021-12-24 NOTE — ASSESSMENT & PLAN NOTE
· Most recent exacerbation in November when he was discharged on a steroid taper  · Patient complains of increased shortness of breath with wheezing, used his nebulizers multiple times a day without improvement    · Start Solu-Medrol 40 mg q 8 hours, in IVPB with 100 mL of normal saline and nebulizer treatments  · Respiratory protocol

## 2021-12-24 NOTE — PROGRESS NOTES
[address] [facility]  Progress Note - Raul Jarrell 1965, 49 y o  male MRN: [id number]  Unit/Bed#: CW2 218-01 Encounter: [id number]  Primary Care Provider: Kaylee Beach MD   Date and time admitted to hospital: 12/23/2021  6:07 AM    Stage 3 chronic kidney disease [facility])  Assessment & Plan  Lab Results   Component Value Date    EGFR 49 12/24/2021    EGFR 51 12/23/2021    EGFR 60 11/15/2021    CREATININE 1 56 (H) 12/24/2021    CREATININE 1 52 (H) 12/23/2021    CREATININE 1 34 (H) 11/15/2021   · Patient presented multiple times to the hospital for CHF an asthma exacerbation and ANTONIO  His baseline creatinine seems to be from 1 1-1 5  · Creatinine today 1 52  · Monitor on diuretics  · Avoid nephrotoxic    Acute on chronic diastolic heart failure [facility])  Assessment & Plan  Wt Readings from Last 3 Encounters:   12/24/21 (!) 150 kg (330 lb 12 8 oz)   12/16/21 (!) 154 kg (339 lb)   11/22/21 (!) 158 kg (348 lb)       · Echo 11/12/21 showed ejection fraction 55% and grade 1 diastolic dysfunction  · Current home regimen is Lasix 40 mg b i d  Which was recently increased by Cardiology  · Start Lasix 60 mg IV b i d    · Intake and output, daily weights, fluid restriction      Moderate persistent asthma with acute exacerbation  Assessment & Plan  · Most recent exacerbation in November when he was discharged on a steroid taper  · Patient complains of increased shortness of breath with wheezing, used his nebulizers multiple times a day without improvement    · Start Solu-Medrol 40 mg q 8 hours, in IVPB with 100 mL of normal saline and nebulizer treatments  · Respiratory protocol    VICKY (obstructive sleep apnea)  Assessment & Plan  · Patient cannot tolerate hospital CPAP    Mixed hyperlipidemia  Assessment & Plan  · Continue Lipitor 10 mg daily    Type 2 diabetes mellitus with hyperglycemia [facility])  Assessment & Plan  Lab Results   Component Value Date    HGBA1C 7 0 (H) 11/10/2021       Recent Labs 12/23/21  1642 12/23/21  2121 12/24/21  0555   POCGLU 375* 366* 296*       Blood Sugar Average: Last 72 hrs:  · (P) 800 [id number] on his previous admission he had steroid induced hyperglycemia  · Were going to start Lantus 10 units HS today with sliding scale and 5-5-5 with meals, please ensure patient is eating before giving   · Avoid hypoglycemia-protocol in place    Essential hypertension  Assessment & Plan  · Continue to monitor on Lasix still high   · Added hydralazine 5 mg q6h prn with SBP>130    * SOB (shortness of breath)  Assessment & Plan  · Patient is a 21-year-old male with a past medical history of chronic diastolic heart failure, moderate persistent asthma, obstructive sleep apnea, hyperlipidemia, type 2 diabetes, hypertension who presents with worsening dyspnea on exertion, lower extremity edema and wheezing  Patient had multiple admissions for CHF exacerbation triggered by asthma exacerbation  Last admission in November  In November he was discharged with Lasix 40 mg in the morning and 30 mg in the evening  Seen by cardiology last week his Lasix was increased to 60 mg b i d  For 2 days and then continue 40 mg b i d  Without any improvement  · Suspect shortness of breath secondary to CHF exacerbation asthma exacerbation  · Patient had good response to Lasix IV 60 mg b i d  In November, will start the same  · Daily weights, intake and output, fluid restriction  · For asthma exacerbation start Solu-Medrol 40 mg q 8 hours, breathing treatments, respiratory protocol             VTE Pharmacologic Prophylaxis: VTE Score: 4 Moderate Risk (Score 3-4) - Pharmacological DVT Prophylaxis Ordered: heparin  Patient Centered Rounds: I performed bedside rounds with nursing staff today  Discussions with Specialists or Other Care Team Provider:      Education and Discussions with Family / Patient: Updated  (wife) via phone   could not leave a message as mailbox is full     Time Spent for Care: 45 minutes  More than 50% of total time spent on counseling and coordination of care as described above  Current Length of Stay: 1 day(s)  Current Patient Status: Inpatient   Certification Statement: The patient will continue to require additional inpatient hospital stay due to fluid overload and sob  Discharge Plan: Anticipate discharge in 48 hrs to home  Code Status: Level 1 - Full Code    Subjective:   Patient is having reaction to steroids  Slow infusion might help  Objective:     Vitals:   Temp (24hrs), Av 2 °F (36 8 °C), Min:97 8 °F (36 6 °C), Max:98 6 °F (37 °C)    Temp:  [97 8 °F (36 6 °C)-98 6 °F (37 °C)] 98 6 °F (37 °C)  Resp:  [17-19] 17  BP: (139-175)/(81-90) 156/90  SpO2:  [95 %] 95 %  Body mass index is 43 65 kg/m²  Input and Output Summary (last 24 hours): Intake/Output Summary (Last 24 hours) at 2021 0830  Last data filed at 2021 0601  Gross per 24 hour   Intake --   Output 1450 ml   Net -1450 ml       Physical Exam:   Physical Exam  Vitals and nursing note reviewed  Constitutional:       Appearance: He is well-developed  HENT:      Head: Normocephalic and atraumatic  Eyes:      Conjunctiva/sclera: Conjunctivae normal    Cardiovascular:      Rate and Rhythm: Normal rate and regular rhythm  Heart sounds: No murmur heard  Pulmonary:      Effort: Pulmonary effort is normal  No respiratory distress  Breath sounds: Wheezing and rhonchi present  Abdominal:      Palpations: Abdomen is soft  Tenderness: There is no abdominal tenderness  Musculoskeletal:      Cervical back: Neck supple  Skin:     General: Skin is warm and dry  Neurological:      Mental Status: He is alert and oriented to person, place, and time     Psychiatric:         Mood and Affect: Mood normal          Behavior: Behavior normal            Additional Data:     Labs:  Results from last 7 days   Lab Units 21  0439   WBC Thousand/uL 11 25*   HEMOGLOBIN g/dL 12 4 HEMATOCRIT % 38 0   PLATELETS Thousands/uL 233   NEUTROS PCT % 89*   LYMPHS PCT % 5*   MONOS PCT % 5   EOS PCT % 0     Results from last 7 days   Lab Units 12/24/21  0439   SODIUM mmol/L 132*   POTASSIUM mmol/L 3 7   CHLORIDE mmol/L 95*   CO2 mmol/L 30   BUN mg/dL 24   CREATININE mg/dL 1 56*   ANION GAP mmol/L 7   CALCIUM mg/dL 9 2   GLUCOSE RANDOM mg/dL 298*         Results from last 7 days   Lab Units 12/24/21  0555 12/23/21  2121 12/23/21  1642   POC GLUCOSE mg/dl 296* 366* 375*               Lines/Drains:  Invasive Devices  Report    Peripheral Intravenous Line            Peripheral IV 12/23/21 Left Antecubital <1 day                  Telemetry:  Telemetry Orders (From admission, onward)             48 Hour Telemetry Monitoring  Continuous x 48 hours        References:    Telemetry Guidelines   Question:  Reason for 48 Hour Telemetry  Answer:  Acute Decompensated CHF (continuous diuretic infusion or total diuretic dose > 200 mg daily, associated electrolyte derangement, ionotropic drip, history of ventricular arrhythmia, or new EF <35%)                 Telemetry Reviewed: Sinus Tachycardia  Indication for Continued Telemetry Use: Arrthymias requiring medical therapy             Imaging: Reviewed radiology reports from this admission including: chest xray    Recent Cultures (last 7 days):         Last 24 Hours Medication List:   Current Facility-Administered Medications   Medication Dose Route Frequency Provider Last Rate    acetaminophen  650 mg Oral Q6H PRN Jesse Galloway MD      atorvastatin  10 mg Oral Daily With Judd Hernandez MD      budesonide-formoterol  2 puff Inhalation BID Jesse Galloway MD      furosemide  60 mg Intravenous BID (diuretic) Jesse Galloway MD      guaiFENesin  600 mg Oral BID Jesse Galloway MD      heparin (porcine)  7,500 Units Subcutaneous Q8H [address] Jesse Galloway MD      hydrALAZINE  5 mg Intravenous Q6H PRN Leah Goodwin MD      insulin glargine  12 Units Subcutaneous HS Kevin Capellan MD      insulin lispro  2-12 Units Subcutaneous TID AC Rachele Pyle MD      insulin lispro  5 Units Subcutaneous TID With Meals Kevin Capellan MD      ipratropium  0 5 mg Nebulization TID Lakeshia Harris MD      levalbuterol  1 25 mg Nebulization TID Lakeshia Harris MD      methylPREDNISolone sodium succinate  40 mg Intravenous Q8H [address] Kevin Capellan MD      montelukast  10 mg Oral HS Lakeshia Harris MD      sodium chloride  1 spray Each Nare Q1H PRN Lakeshia Harris MD          Today, Patient Was Seen By: Kevin Capellan MD    **Please Note: This note may have been constructed using a voice recognition system  **

## 2021-12-24 NOTE — ASSESSMENT & PLAN NOTE
· Patient is a 65-year-old male with a past medical history of chronic diastolic heart failure, moderate persistent asthma, obstructive sleep apnea, hyperlipidemia, type 2 diabetes, hypertension who presents with worsening dyspnea on exertion, lower extremity edema and wheezing  Patient had multiple admissions for CHF exacerbation triggered by asthma exacerbation  Last admission in November  In November he was discharged with Lasix 40 mg in the morning and 30 mg in the evening  Seen by cardiology last week his Lasix was increased to 60 mg b i d  For 2 days and then continue 40 mg b i d  Without any improvement  · Suspect shortness of breath secondary to CHF exacerbation asthma exacerbation  · Patient had good response to Lasix IV 60 mg b i d   In November, will start the same  · Daily weights, intake and output, fluid restriction  · For asthma exacerbation start Solu-Medrol 40 mg q 8 hours, breathing treatments, respiratory protocol

## 2021-12-24 NOTE — UTILIZATION REVIEW
Inpatient Admission Authorization Request   NOTIFICATION OF INPATIENT ADMISSION/INPATIENT AUTHORIZATION REQUEST   SERVICING FACILITY:   [facility]  Address: [address] [place] [address], [address] [place] [address]  Tax ID: [id number]  NPI: [id number]  Place of Service: Inpatient [address] [facility] Code: 24     ATTENDING PROVIDER:  Attending Name and NPI#: Nilo Mix Md [[id number]]  Address: [address] [place] [address], [address] [place] [address]  Phone: [phone]     UTILIZATION REVIEW CONTACT:  Carrol Barlow Utilization   Network Utilization Review Department  Phone: [phone]  Fax: [phone]  Email: [email or web address]     PHYSICIAN ADVISORY SERVICES:  FOR [id number] REVIEW - MEDICAL NECESSITY DENIAL  Phone: [phone]  Fax: [phone]  Email: [email or web address]     TYPE OF REQUEST:  Inpatient Status     ADMISSION INFORMATION:  ADMISSION DATE/TIME: 12/23/21  8:21 AM  PATIENT DIAGNOSIS CODE/DESCRIPTION:  SOB (shortness of breath) [R06 02]  Asthma exacerbation [J45 901]  DISCHARGE DATE/TIME: No discharge date for patient encounter  DISCHARGE DISPOSITION (IF DISCHARGED): Home/Self Care     IMPORTANT INFORMATION:  Please contact the Carrol Barlow directly with any questions or concerns regarding this request  Department voicemails are confidential     Send requests for admission clinical reviews, concurrent reviews, approvals, and administrative denials due to lack of clinical to fax [phone]

## 2021-12-24 NOTE — ASSESSMENT & PLAN NOTE
Lab Results   Component Value Date    EGFR 49 12/24/2021    EGFR 51 12/23/2021    EGFR 60 11/15/2021    CREATININE 1 56 (H) 12/24/2021    CREATININE 1 52 (H) 12/23/2021    CREATININE 1 34 (H) 11/15/2021   · Patient presented multiple times to the hospital for CHF an asthma exacerbation and ANTONIO    His baseline creatinine seems to be from 1 1-1 5  · Creatinine today 1 52  · Monitor on diuretics  · Avoid nephrotoxic

## 2021-12-24 NOTE — UTILIZATION REVIEW
Inpatient Admission Authorization Request   NOTIFICATION OF INPATIENT ADMISSION/INPATIENT AUTHORIZATION REQUEST   SERVICING FACILITY:   [facility]  Address: [address] [place] [address], [address] [place] [address]  Tax ID: [id number]  NPI: [id number]  Place of Service: Inpatient [address] [facility] Code: 24     ATTENDING PROVIDER:  Attending Name and NPI#: Macy Sebastian Md [[id number]]  Address: [address] [place] [address], [address] [place] [address]  Phone: [phone]     UTILIZATION REVIEW CONTACT:  Cinthya Adan Utilization   Network Utilization Review Department  Phone: [phone]  Fax: [phone]  Email: [email or web address]     PHYSICIAN ADVISORY SERVICES:  FOR [id number]-YB-RLVG REVIEW - MEDICAL NECESSITY DENIAL  Phone: [phone]  Fax: [phone]  Email: [email or web address]     TYPE OF REQUEST:  Inpatient Status     ADMISSION INFORMATION:  ADMISSION DATE/TIME: 12/23/21  8:21 AM  PATIENT DIAGNOSIS CODE/DESCRIPTION:  SOB (shortness of breath) [R06 02]  Asthma exacerbation [J45 901]  DISCHARGE DATE/TIME: No discharge date for patient encounter  DISCHARGE DISPOSITION (IF DISCHARGED): Home/Self Care     IMPORTANT INFORMATION:  Please contact the Cinthya Adan directly with any questions or concerns regarding this request  Department voicemails are confidential     Send requests for admission clinical reviews, concurrent reviews, approvals, and administrative denials due to lack of clinical to fax [phone]

## 2021-12-24 NOTE — ASSESSMENT & PLAN NOTE
Lab Results   Component Value Date    HGBA1C 7 0 (H) 11/10/2021       Recent Labs     12/23/21  1642 12/23/21  2121 12/24/21  0555   POCGLU 375* 366* 296*       Blood Sugar Average: Last 72 hrs:  · (P) 080 [id number] on his previous admission he had steroid induced hyperglycemia  · Were going to start Lantus 10 units HS today with sliding scale and 5-5-5 with meals, please ensure patient is eating before giving   · Avoid hypoglycemia-protocol in place

## 2021-12-24 NOTE — ASSESSMENT & PLAN NOTE
Wt Readings from Last 3 Encounters:   12/24/21 (!) 150 kg (330 lb 12 8 oz)   12/16/21 (!) 154 kg (339 lb)   11/22/21 (!) 158 kg (348 lb)       · Echo 11/12/21 showed ejection fraction 55% and grade 1 diastolic dysfunction  · Current home regimen is Lasix 40 mg b i d  Which was recently increased by Cardiology    · Start Lasix 60 mg IV b i d    · Intake and output, daily weights, fluid restriction

## 2021-12-24 NOTE — PLAN OF CARE
Problem: SAFETY ADULT  Goal: Maintain or return to baseline ADL function  Description: INTERVENTIONS:  -  Assess patient's ability to carry out ADLs; assess patient's baseline for ADL function and identify physical deficits which impact ability to perform ADLs (bathing, care of mouth/teeth, toileting, grooming, dressing, etc )  - Assess/evaluate cause of self-care deficits   - Assess range of motion  - Assess patient's mobility; develop plan if impaired  - Assess patient's need for assistive devices and provide as appropriate  - Encourage maximum independence but intervene and supervise when necessary  - Involve family in performance of ADLs  - Assess for home care needs following discharge   - Consider OT consult to assist with ADL evaluation and planning for discharge  - Provide patient education as appropriate  Outcome: Progressing

## 2021-12-24 NOTE — UTILIZATION REVIEW
Initial Clinical Review    Admission: Date/Time/Statement:   Admission Orders (From admission, onward)     Ordered        12/23/21 0821  Inpatient Admission  Once                      Orders Placed This Encounter   Procedures    Inpatient Admission     Standing Status:   Standing     Number of Occurrences:   1     Order Specific Question:   Level of Care     Answer:   Med Surg [16]     Order Specific Question:   Estimated length of stay     Answer:   More than 2 Midnights     Order Specific Question:   Certification     Answer:   I certify that inpatient services are medically necessary for this patient for a duration of greater than two midnights  See H&P and MD Progress Notes for additional information about the patient's course of treatment  ED Arrival Information     Expected Arrival Acuity    - 12/23/2021 06:07 Urgent         Means of arrival Escorted by Service Admission type    [facility]         Arrival complaint    Shortness Of Breath        Chief Complaint   Patient presents with    Shortness of Breath     pt brought in by [facility] has been SOB for 1x week, lasix has not been helping w/ a CHF & asthma history        Initial Presentation: 50 y/o male with PMH of chronic diastolic heart failure, moderate persistent asthma, obstructive sleep apnea, hyperlipidemia, type 2 diabetes, hypertension presents to [facility] ED via personal vehicle with c/o SOB  Patient had multiple admissions for CHF exacerbation triggered by asthma exacerbation  Lasix recently increased by cardiology  TELLES, lower extr edema, expiratory wheezing noted and used of neb tx multiple times a day without any improvement  Decreased urine output, hypoxic on ra in ed at 89%, placed on 2 l nc O2  Admit Inpatient med surg telemetry, iv lasix, monitor io and daily wts, fluid restriction, solu-medrol for asthma exacerbation, breathing txs,   Continue home lipitor, accuchecks w/ssi      Date: 12/24 Day 2: hospitalist note  Patient is having reaction to steroids  Slow infusion might help  Wheezing and rhonchi present  Continue IO, daily wts and fluid restriction, start Solu-medrol IVPB, neb txs, continue home meds, continue SSI,     ED Triage Vitals   Temperature Pulse Respirations Blood Pressure SpO2   12/23/21 0612 12/23/21 0612 12/23/21 0612 12/23/21 0612 12/23/21 0613   98 3 °F (36 8 °C) (!) 108 20 (!) 179/106 93 %      Temp Source Heart Rate Source Patient Position - Orthostatic VS BP Location FiO2 (%)   12/23/21 0612 12/23/21 0612 12/23/21 0612 12/23/21 0612 --   Oral Monitor Sitting Right arm       Pain Score       12/23/21 1011       No Pain          Wt Readings from Last 1 Encounters:   12/24/21 (!) 150 kg (330 lb 12 8 oz)     Additional Vital Signs:   Date/Time Temp Pulse Resp BP MAP (mmHg) SpO2 O2 Device Patient Position - Orthostatic VS   12/24/21 07:36:21 -- -- 17 156/90 112 -- -- --   12/24/21 06:36:45 -- -- -- 175/90 Abnormal  118 -- -- --   12/24/21 00:35:45 -- -- 19 139/81 100 -- -- --   12/23/21 1917 -- -- -- -- -- 95 % None (Room air) --   12/23/21 16:43:40 98 6 °F (37 °C) -- 18 164/85 111 -- -- --   12/23/21 09:31:04 97 8 °F (36 6 °C) -- 18 163/86 112 -- -- --   12/23/21 0715 -- 102 20 -- -- 89 % Abnormal  -- --   12/23/21 0615 -- -- -- -- -- -- None (Room air) --     Pertinent Labs/Diagnostic Test Results:   12/23 cxr:  nad  12/23 ekg:   St    Results from last 7 days   Lab Units 12/23/21  0637   SARS-COV-2  Negative     Results from last 7 days   Lab Units 12/24/21 0439 12/23/21  0630   WBC Thousand/uL 11 25* 9 87   HEMOGLOBIN g/dL 12 4 13 1   HEMATOCRIT % 38 0 39 8   PLATELETS Thousands/uL 233 263   NEUTROS ABS Thousands/µL 9 93* 7 16     Results from last 7 days   Lab Units 12/24/21  0439 12/23/21  0630   SODIUM mmol/L 132* 134*   POTASSIUM mmol/L 3 7 3 7   CHLORIDE mmol/L 95* 96*   CO2 mmol/L 30 31   ANION GAP mmol/L 7 7   BUN mg/dL 24 16   CREATININE mg/dL 1 56* 1 52*   EGFR ml/min/1 73sq m 49 51   CALCIUM mg/dL 9 2 8 3   MAGNESIUM mg/dL 2 1  --      Results from last 7 days   Lab Units 12/24/21  0555 12/23/21  2121 12/23/21  1642   POC GLUCOSE mg/dl 296* 366* 375*     Results from last 7 days   Lab Units 12/24/21  0439 12/23/21  0630   GLUCOSE RANDOM mg/dL 298* 187*     Results from last 7 days   Lab Units 12/23/21  1229 12/23/21  0630   HS TNI 0HR ng/L  --  20   HS TNI 2HR ng/L 19  --    HSTNI D2 ng/L -1  --      Results from last 7 days   Lab Units 12/23/21  0637   INFLUENZA A PCR  Negative   INFLUENZA B PCR  Negative   RSV PCR  Negative     ED Treatment:   Medication Administration from 12/23/2021 2210 to 12/23/2021 8908       Date/Time Order Dose Route Action     12/23/2021 0631 albuterol inhalation solution 5 mg 5 mg Nebulization Given     12/23/2021 0631 ipratropium (ATROVENT) 0 02 % inhalation solution 0 5 mg 0 5 mg Nebulization Given     12/23/2021 0754 ipratropium (ATROVENT) 0 02 % inhalation solution 0 5 mg 0 5 mg Nebulization Given     12/23/2021 0754 albuterol inhalation solution 5 mg 5 mg Nebulization Given     12/23/2021 1363 predniSONE tablet 50 mg 50 mg Oral Given     12/23/2021 0811 magnesium sulfate 2 g/50 mL IVPB (premix) 2 g 2 g Intravenous New Bag        Past Medical History:   Diagnosis Date    Acute gastritis without hemorrhage     last assessed 3/24/17    Asthma     Diabetes mellitus ([facility] [address] )     Gastric bypass status for obesity     Hyperlipidemia     Hypertension     Impaired fasting glucose     last assessed 5/10/17    Obesity     Viral gastroenteritis     last assessed 11/4/16     Present on Admission:   Acute on chronic diastolic heart failure ([facility] [address] )   Essential hypertension   Mixed hyperlipidemia   Moderate persistent asthma with acute exacerbation   VICKY (obstructive sleep apnea)   Type 2 diabetes mellitus with hyperglycemia (HCC)   Stage 3 chronic kidney disease (HCC)      Admitting Diagnosis: SOB (shortness of breath) [R06 02]  Asthma exacerbation [J45 901]  Age/Sex: 49 y o  male  Admission Orders:  Scheduled Medications:  atorvastatin, 10 mg, Oral, Daily With Dinner  budesonide-formoterol, 2 puff, Inhalation, BID  furosemide, 60 mg, Intravenous, BID (diuretic)  guaiFENesin, 600 mg, Oral, BID  heparin (porcine), 7,500 Units, Subcutaneous, Q8H ANA  insulin glargine, 12 Units, Subcutaneous, HS  insulin lispro, 2-12 Units, Subcutaneous, TID AC  insulin lispro, 5 Units, Subcutaneous, TID With Meals  ipratropium, 0 5 mg, Nebulization, TID  levalbuterol, 1 25 mg, Nebulization, TID  IVPB builder, , Intravenous, Q8H  montelukast, 10 mg, Oral, HS      Continuous IV Infusions: none     PRN Meds:  acetaminophen, 650 mg, Oral, Q6H PRN  hydrALAZINE, 5 mg, Intravenous, Q6H PRN  sodium chloride, 1 spray, Each Nare, Q1H PRN    scd    IP CONSULT TO NUTRITION SERVICES    Network Utilization Review Department  ATTENTION: Please call with any questions or concerns to [phone] and carefully listen to the prompts so that you are directed to the right person  All voicemails are confidential   Horacio Mathias all requests for admission clinical reviews, approved or denied determinations and any other requests to dedicated fax number below belonging to the campus where the patient is receiving treatment   List of dedicated fax numbers for the Facilities:  [address] DENIALS (Administrative/Medical Necessity) [phone]   [address] (Maternity/NICU/Pediatrics) 261 [facility],[address]  [address] [facility] [place] [address] [address] [place] [address] [address] [phone]

## 2021-12-25 LAB
ANION GAP SERPL CALCULATED.3IONS-SCNC: 6 MMOL/L (ref 4–13)
BASOPHILS # BLD AUTO: 0.03 THOUSANDS/ΜL (ref 0–0.1)
BASOPHILS NFR BLD AUTO: 0 % (ref 0–1)
BUN SERPL-MCNC: 33 MG/DL (ref 5–25)
CALCIUM SERPL-MCNC: 8 MG/DL (ref 8.3–10.1)
CHLORIDE SERPL-SCNC: 97 MMOL/L (ref 100–108)
CO2 SERPL-SCNC: 32 MMOL/L (ref 21–32)
CREAT SERPL-MCNC: 1.59 MG/DL (ref 0.6–1.3)
EOSINOPHIL # BLD AUTO: 0 THOUSAND/ΜL (ref 0–0.61)
EOSINOPHIL NFR BLD AUTO: 0 % (ref 0–6)
ERYTHROCYTE [DISTWIDTH] IN BLOOD BY AUTOMATED COUNT: 13.3 % (ref 11.6–15.1)
GFR SERPL CREATININE-BSD FRML MDRD: 48 ML/MIN/1.73SQ M
GLUCOSE SERPL-MCNC: 263 MG/DL (ref 65–140)
GLUCOSE SERPL-MCNC: 296 MG/DL (ref 65–140)
GLUCOSE SERPL-MCNC: 317 MG/DL (ref 65–140)
GLUCOSE SERPL-MCNC: 359 MG/DL (ref 65–140)
GLUCOSE SERPL-MCNC: 424 MG/DL (ref 65–140)
HCT VFR BLD AUTO: 37.1 % (ref 36.5–49.3)
HGB BLD-MCNC: 11.9 G/DL (ref 12–17)
IMM GRANULOCYTES # BLD AUTO: 0.15 THOUSAND/UL (ref 0–0.2)
IMM GRANULOCYTES NFR BLD AUTO: 1 % (ref 0–2)
LYMPHOCYTES # BLD AUTO: 0.69 THOUSANDS/ΜL (ref 0.6–4.47)
LYMPHOCYTES NFR BLD AUTO: 6 % (ref 14–44)
MCH RBC QN AUTO: 30.1 PG (ref 26.8–34.3)
MCHC RBC AUTO-ENTMCNC: 32.1 G/DL (ref 31.4–37.4)
MCV RBC AUTO: 94 FL (ref 82–98)
MONOCYTES # BLD AUTO: 0.44 THOUSAND/ΜL (ref 0.17–1.22)
MONOCYTES NFR BLD AUTO: 4 % (ref 4–12)
NEUTROPHILS # BLD AUTO: 9.6 THOUSANDS/ΜL (ref 1.85–7.62)
NEUTS SEG NFR BLD AUTO: 89 % (ref 43–75)
NRBC BLD AUTO-RTO: 0 /100 WBCS
PLATELET # BLD AUTO: 197 THOUSANDS/UL (ref 149–390)
PMV BLD AUTO: 10.4 FL (ref 8.9–12.7)
POTASSIUM SERPL-SCNC: 3.9 MMOL/L (ref 3.5–5.3)
RBC # BLD AUTO: 3.95 MILLION/UL (ref 3.88–5.62)
SODIUM SERPL-SCNC: 135 MMOL/L (ref 136–145)
WBC # BLD AUTO: 10.91 THOUSAND/UL (ref 4.31–10.16)

## 2021-12-25 PROCEDURE — 99233 SBSQ HOSP IP/OBS HIGH 50: CPT | Performed by: PHYSICIAN ASSISTANT

## 2021-12-25 PROCEDURE — 94640 AIRWAY INHALATION TREATMENT: CPT

## 2021-12-25 PROCEDURE — 94760 N-INVAS EAR/PLS OXIMETRY 1: CPT

## 2021-12-25 PROCEDURE — 80048 BASIC METABOLIC PNL TOTAL CA: CPT | Performed by: INTERNAL MEDICINE

## 2021-12-25 PROCEDURE — 85025 COMPLETE CBC W/AUTO DIFF WBC: CPT | Performed by: INTERNAL MEDICINE

## 2021-12-25 PROCEDURE — 82948 REAGENT STRIP/BLOOD GLUCOSE: CPT

## 2021-12-25 RX ORDER — INSULIN GLARGINE 100 [IU]/ML
25 INJECTION, SOLUTION SUBCUTANEOUS
Status: DISCONTINUED | OUTPATIENT
Start: 2021-12-25 | End: 2021-12-31

## 2021-12-25 RX ORDER — INSULIN GLARGINE 100 [IU]/ML
20 INJECTION, SOLUTION SUBCUTANEOUS
Status: DISCONTINUED | OUTPATIENT
Start: 2021-12-25 | End: 2021-12-25

## 2021-12-25 RX ORDER — INSULIN GLARGINE 100 [IU]/ML
15 INJECTION, SOLUTION SUBCUTANEOUS
Status: DISCONTINUED | OUTPATIENT
Start: 2021-12-25 | End: 2021-12-25

## 2021-12-25 RX ADMIN — INSULIN LISPRO 5 UNITS: 100 INJECTION, SOLUTION INTRAVENOUS; SUBCUTANEOUS at 07:35

## 2021-12-25 RX ADMIN — LEVALBUTEROL HYDROCHLORIDE 1.25 MG: 1.25 SOLUTION, CONCENTRATE RESPIRATORY (INHALATION) at 07:51

## 2021-12-25 RX ADMIN — SODIUM CHLORIDE: 9 INJECTION, SOLUTION INTRAVENOUS at 23:00

## 2021-12-25 RX ADMIN — HEPARIN SODIUM 7500 UNITS: 5000 INJECTION INTRAVENOUS; SUBCUTANEOUS at 14:09

## 2021-12-25 RX ADMIN — SODIUM CHLORIDE: 9 INJECTION, SOLUTION INTRAVENOUS at 06:43

## 2021-12-25 RX ADMIN — GUAIFENESIN 600 MG: 600 TABLET, EXTENDED RELEASE ORAL at 17:29

## 2021-12-25 RX ADMIN — LEVALBUTEROL HYDROCHLORIDE 1.25 MG: 1.25 SOLUTION, CONCENTRATE RESPIRATORY (INHALATION) at 21:00

## 2021-12-25 RX ADMIN — BUDESONIDE AND FORMOTEROL FUMARATE DIHYDRATE 2 PUFF: 160; 4.5 AEROSOL RESPIRATORY (INHALATION) at 17:29

## 2021-12-25 RX ADMIN — IPRATROPIUM BROMIDE 0.5 MG: 0.5 SOLUTION RESPIRATORY (INHALATION) at 07:51

## 2021-12-25 RX ADMIN — GUAIFENESIN 600 MG: 600 TABLET, EXTENDED RELEASE ORAL at 08:14

## 2021-12-25 RX ADMIN — IPRATROPIUM BROMIDE 0.5 MG: 0.5 SOLUTION RESPIRATORY (INHALATION) at 14:02

## 2021-12-25 RX ADMIN — FUROSEMIDE 60 MG: 10 INJECTION, SOLUTION INTRAMUSCULAR; INTRAVENOUS at 15:59

## 2021-12-25 RX ADMIN — INSULIN GLARGINE 25 UNITS: 100 INJECTION, SOLUTION SUBCUTANEOUS at 22:59

## 2021-12-25 RX ADMIN — BUDESONIDE AND FORMOTEROL FUMARATE DIHYDRATE 2 PUFF: 160; 4.5 AEROSOL RESPIRATORY (INHALATION) at 08:14

## 2021-12-25 RX ADMIN — INSULIN LISPRO 5 UNITS: 100 INJECTION, SOLUTION INTRAVENOUS; SUBCUTANEOUS at 11:07

## 2021-12-25 RX ADMIN — ATORVASTATIN CALCIUM 10 MG: 10 TABLET, FILM COATED ORAL at 16:00

## 2021-12-25 RX ADMIN — FUROSEMIDE 60 MG: 10 INJECTION, SOLUTION INTRAMUSCULAR; INTRAVENOUS at 08:14

## 2021-12-25 RX ADMIN — HEPARIN SODIUM 7500 UNITS: 5000 INJECTION INTRAVENOUS; SUBCUTANEOUS at 06:43

## 2021-12-25 RX ADMIN — HEPARIN SODIUM 7500 UNITS: 5000 INJECTION INTRAVENOUS; SUBCUTANEOUS at 22:59

## 2021-12-25 RX ADMIN — MONTELUKAST 10 MG: 10 TABLET, FILM COATED ORAL at 22:59

## 2021-12-25 RX ADMIN — LEVALBUTEROL HYDROCHLORIDE 1.25 MG: 1.25 SOLUTION, CONCENTRATE RESPIRATORY (INHALATION) at 14:02

## 2021-12-25 RX ADMIN — IPRATROPIUM BROMIDE 0.5 MG: 0.5 SOLUTION RESPIRATORY (INHALATION) at 21:00

## 2021-12-25 RX ADMIN — SODIUM CHLORIDE: 9 INJECTION, SOLUTION INTRAVENOUS at 14:12

## 2021-12-25 NOTE — PROGRESS NOTES
[address] [facility]  Progress Note - Vergil Knows 1965, 49 y o  male MRN: [id number]  Unit/Bed#: CW2 218-01 Encounter: [id number]  Primary Care Provider: Elvin Quiros MD   Date and time admitted to hospital: 12/23/2021  6:07 AM    * Acute on chronic diastolic heart failure [facility])  Assessment & Plan  Wt Readings from Last 3 Encounters:   12/24/21 (!) 150 kg (330 lb 12 8 oz)   12/16/21 (!) 154 kg (339 lb)   11/22/21 (!) 158 kg (348 lb)     · Presented with TELLES/SOB, increasing LE edema, and orthopnea   Multiple recent admissions for CHF exacerbation triggered by asthma exacerbation  · Echo 11/12/21 showed ejection fraction 55% and grade 1 diastolic dysfunction  · Current home regimen is Lasix 40 mg b i d  which was recently increased by OP cardiology given repeated admissions for HF exacerbations in setting of asthma exacerbation  · Will need to reschedule outpatient stress test/ischemic workup upon discharge  · Good response to diuresis with 60 mg IV lasix bid, continue for now, improvement in LE edema and orthopnea however still appears hypervolemic  · Intake and output, daily weights, fluid restriction    Moderate persistent asthma with acute exacerbation  Assessment & Plan  · Most recent exacerbation in November, was discharged on a steroid taper  · On admission, c/o SOB with wheezing not improved with nebs  · Started on Solu-Medrol 40 mg q 8 hours and scheduled xopenex/atrovent with respiratory protocol   · Breathing improved but still with dyspnea and persistent wheezing, continue solumedrol 40 mg tid, can likely decrease to 40 bid tomorrow    Type 2 diabetes mellitus with hyperglycemia [facility])  Assessment & Plan  Lab Results   Component Value Date    HGBA1C 7 0 (H) 11/10/2021       Recent Labs     12/24/21  0555 12/24/21  1102 12/24/21  1717 12/24/21 2051   POCGLU 296* 342* 292* 242*       Blood Sugar Average: Last 72 hrs:  · Started on Lantus 10 units HS with sliding scale and 5-5-5 with meals, please ensure patient is eating before giving   · 12/25- Increase lantus from 12 to 15 units given BG still above goal while on IV steroids  · Avoid hypoglycemia-protocol in place  · Trend BGs, increase insulin as necessary    Essential hypertension  Assessment & Plan  · Above goal on admission  · PRN hydralazine 5 mg q6h prn for SBP>130  · Trend BPs while receiving IV diuresis    Mixed hyperlipidemia  Assessment & Plan  · Continue Lipitor 10 mg daily    Morbid obesity ([address] )  Assessment & Plan  · Dietary, lifestyle modifications encouraged    Stage 3 chronic kidney disease [facility])  Assessment & Plan  Lab Results   Component Value Date    EGFR 49 12/24/2021    EGFR 51 12/23/2021    EGFR 60 11/15/2021    CREATININE 1 56 (H) 12/24/2021    CREATININE 1 52 (H) 12/23/2021    CREATININE 1 34 (H) 11/15/2021   · Baseline creatinine 1 1-1 5  · Creatinine today 1 52  · Monitor kidney function while receiving diuretics  · Avoid nephrotoxins and hypotension    VICKY (obstructive sleep apnea)  Assessment & Plan  · Patient cannot tolerate hospital CPAP  · Continue home CPAP as outpatient         VTE Pharmacologic Prophylaxis: VTE Score: 4 Moderate Risk (Score 3-4) - Pharmacological DVT Prophylaxis Ordered: heparin  Patient Centered Rounds: I evaluated the patient without nursing staff present due to early rounds  Discussions with Specialists or Other Care Team Provider: none    Education and Discussions with Family / Patient: Attempted to update  (wife) via phone  Unable to contact  VM for wife's mobile not same as name in contact info- unable to leave message    Time Spent for Care: 30 minutes  More than 50% of total time spent on counseling and coordination of care as described above      Current Length of Stay: 2 day(s)  Current Patient Status: Inpatient   Certification Statement: The patient will continue to require additional inpatient hospital stay due to continued IV diuresis and IV steroids  Discharge Plan: Anticipate discharge in 48 hrs to home  Code Status: Level 1 - Full Code    Subjective:   Feeling well, breathing and LE edema improved but still notes dyspnea with exertion, wheezing, and unable to lay flat  Notes good UOP with IV lasix    Objective:     Vitals:   Temp (24hrs), Av °F (36 7 °C), Min:97 9 °F (36 6 °C), Max:98 °F (36 7 °C)    Temp:  [97 9 °F (36 6 °C)-98 °F (36 7 °C)] 98 °F (36 7 °C)  Resp:  [17-19] 19  BP: (142-175)/(78-99) 147/99  SpO2:  [96 %] 96 %  Body mass index is 43 65 kg/m²  Input and Output Summary (last 24 hours): Intake/Output Summary (Last 24 hours) at 2021 9916  Last data filed at 2021 2328  Gross per 24 hour   Intake 1260 ml   Output 1050 ml   Net 210 ml       Physical Exam:   Physical Exam  Constitutional:       General: He is not in acute distress  Appearance: Normal appearance  He is obese  HENT:      Head: Normocephalic  Mouth/Throat:      Mouth: Mucous membranes are moist    Eyes:      Extraocular Movements: Extraocular movements intact  Pupils: Pupils are equal, round, and reactive to light  Cardiovascular:      Rate and Rhythm: Normal rate and regular rhythm  Pulses: Normal pulses  Pulmonary:      Effort: Pulmonary effort is normal  No respiratory distress  Breath sounds: Wheezing present  No rales  Comments: Expiratory wheezing R>L  Abdominal:      General: Abdomen is flat  Bowel sounds are normal  There is no distension  Palpations: Abdomen is soft  Tenderness: There is no abdominal tenderness  Musculoskeletal:         General: Swelling present  Normal range of motion  Cervical back: Normal range of motion  Comments: 1+ pitting edema up to ankles b/l   Skin:     General: Skin is warm and dry  Neurological:      General: No focal deficit present  Mental Status: He is alert and oriented to person, place, and time     Psychiatric:         Mood and Affect: Mood normal           Additional Data:     Labs:  Results from last 7 days   Lab Units 12/25/21  0438   WBC Thousand/uL 10 91*   HEMOGLOBIN g/dL 11 9*   HEMATOCRIT % 37 1   PLATELETS Thousands/uL 197   NEUTROS PCT % 89*   LYMPHS PCT % 6*   MONOS PCT % 4   EOS PCT % 0     Results from last 7 days   Lab Units 12/25/21  0438   SODIUM mmol/L 135*   POTASSIUM mmol/L 3 9   CHLORIDE mmol/L 97*   CO2 mmol/L 32   BUN mg/dL 33*   CREATININE mg/dL 1 59*   ANION GAP mmol/L 6   CALCIUM mg/dL 8 0*   GLUCOSE RANDOM mg/dL 296*         Results from last 7 days   Lab Units 12/24/21  2051 12/24/21  1717 12/24/21  1102 12/24/21  0555 12/23/21  2121 12/23/21  1642   POC GLUCOSE mg/dl 242* 292* 342* 296* 366* 375*               Lines/Drains:  Invasive Devices  Report    Peripheral Intravenous Line            Peripheral IV 12/23/21 Left Antecubital 1 day                  Telemetry:  Telemetry Orders (From admission, onward)             48 Hour Telemetry Monitoring  Continuous x 48 hours        Expiring   References:    Telemetry Guidelines   Question:  Reason for 48 Hour Telemetry  Answer:  Acute Decompensated CHF (continuous diuretic infusion or total diuretic dose > 200 mg daily, associated electrolyte derangement, ionotropic drip, history of ventricular arrhythmia, or new EF <35%)                 Telemetry Reviewed: Normal Sinus Rhythm  Indication for Continued Telemetry Use: No indication for continued use  Will discontinue                Imaging: Personally reviewed the following imaging: chest xray    Recent Cultures (last 7 days):         Last 24 Hours Medication List:   Current Facility-Administered Medications   Medication Dose Route Frequency Provider Last Rate    acetaminophen  650 mg Oral Q6H PRN Lamona Angelucci, MD      atorvastatin  10 mg Oral Daily With Sheldon Antunez MD      budesonide-formoterol  2 puff Inhalation BID Lamona Angelucci, MD      furosemide  60 mg Intravenous BID (diuretic) Lamona Angelucci, MD  guaiFENesin  600 mg Oral BID Lamona Angelucci, MD      heparin (porcine)  7,500 Units Subcutaneous [facility] Lamona Angelucci, MD      hydrALAZINE  5 mg Intravenous Q6H PRN Matthieu Chapman MD      insulin glargine  15 Units Subcutaneous HS Rhonda Dawson PA-C      insulin lispro  2-12 Units Subcutaneous 4x Daily (with meals and at bedtime) Rhonda Dawson PA-C      insulin lispro  5 Units Subcutaneous TID With Meals Matthieu Chapman MD      ipratropium  0 5 mg Nebulization TID Lamona Angelucci, MD      levalbuterol  1 25 mg Nebulization TID Lamona Angelucci, MD      IVPB builder   Intravenous Q8H Matthieu Chapman  mL/hr at 12/24/21 2202    montelukast  10 mg Oral HS Lamona Angelucci, MD      sodium chloride  1 spray Each Nare Q1H PRN Lamona Angelucci, MD          Today, Patient Was Seen By: Rhonda Dawson PA-C    **Please Note: This note may have been constructed using a voice recognition system  **

## 2021-12-25 NOTE — ASSESSMENT & PLAN NOTE
Wt Readings from Last 3 Encounters:   12/24/21 (!) 150 kg (330 lb 12 8 oz)   12/16/21 (!) 154 kg (339 lb)   11/22/21 (!) 158 kg (348 lb)     · Presented with TELLES/SOB, increasing LE edema, and orthopnea   Multiple recent admissions for CHF exacerbation triggered by asthma exacerbation  · Echo 11/12/21 showed ejection fraction 55% and grade 1 diastolic dysfunction  · Current home regimen is Lasix 40 mg b i d  which was recently increased by OP cardiology given repeated admissions for HF exacerbations in setting of asthma exacerbation  · Will need to reschedule outpatient stress test/ischemic workup upon discharge  · Good response to diuresis with 60 mg IV lasix bid, continue for now, improvement in LE edema and orthopnea however still appears hypervolemic  · Intake and output, daily weights, fluid restriction

## 2021-12-25 NOTE — ASSESSMENT & PLAN NOTE
Lab Results   Component Value Date    EGFR 49 12/24/2021    EGFR 51 12/23/2021    EGFR 60 11/15/2021    CREATININE 1 56 (H) 12/24/2021    CREATININE 1 52 (H) 12/23/2021    CREATININE 1 34 (H) 11/15/2021   · Baseline creatinine 1 1-1 5  · Creatinine today 1 52  · Monitor kidney function while receiving diuretics  · Avoid nephrotoxins and hypotension

## 2021-12-25 NOTE — ASSESSMENT & PLAN NOTE
Lab Results   Component Value Date    HGBA1C 7 0 (H) 11/10/2021       Recent Labs     12/24/21  0555 12/24/21  1102 12/24/21  1717 12/24/21 2051   POCGLU 296* 342* 292* 242*       Blood Sugar Average: Last 72 hrs:  · Started on Lantus 10 units HS with sliding scale and 5-5-5 with meals, please ensure patient is eating before giving   · 12/25- Increase lantus from 12 to 15 units given BG still above goal while on IV steroids  · Avoid hypoglycemia-protocol in place  · Trend BGs, increase insulin as necessary

## 2021-12-25 NOTE — ASSESSMENT & PLAN NOTE
· Above goal on admission  · PRN hydralazine 5 mg q6h prn for SBP>130  · Trend BPs while receiving IV diuresis

## 2021-12-25 NOTE — PLAN OF CARE
Problem: Potential for Falls  Goal: Patient will remain free of falls  Description: INTERVENTIONS:  - Educate patient/family on patient safety including physical limitations  - Instruct patient to call for assistance with activity   - Consult OT/PT to assist with strengthening/mobility   - Keep Call bell within reach  - Keep bed low and locked with side rails adjusted as appropriate  - Keep care items and personal belongings within reach  - Initiate and maintain comfort rounds  - Make Fall Risk Sign visible to staff  - Offer Toileting every  Hours, in advance of need  - Initiate/Maintain alarm  - Obtain necessary fall risk management equipment:   - Apply yellow socks and bracelet for high fall risk patients  - Consider moving patient to room near nurses station  Outcome: Progressing     Problem: Nutrition/Hydration-ADULT  Goal: Nutrient/Hydration intake appropriate for improving, restoring or maintaining nutritional needs  Description: Monitor and assess patient's nutrition/hydration status for malnutrition  Collaborate with interdisciplinary team and initiate plan and interventions as ordered  Monitor patient's weight and dietary intake as ordered or per policy  Utilize nutrition screening tool and intervene as necessary  Determine patient's food preferences and provide high-protein, high-caloric foods as appropriate       INTERVENTIONS:  - Monitor oral intake, urinary output, labs, and treatment plans  - Assess nutrition and hydration status and recommend course of action  - Evaluate amount of meals eaten  - Assist patient with eating if necessary   - Allow adequate time for meals  - Recommend/ encourage appropriate diets, oral nutritional supplements, and vitamin/mineral supplements  - Order, calculate, and assess calorie counts as needed  - Recommend, monitor, and adjust tube feedings and TPN/PPN based on assessed needs  - Assess need for intravenous fluids  - Provide specific nutrition/hydration education as appropriate  - Include patient/family/caregiver in decisions related to nutrition  Outcome: Progressing     Problem: PAIN - ADULT  Goal: Verbalizes/displays adequate comfort level or baseline comfort level  Description: Interventions:  - Encourage patient to monitor pain and request assistance  - Assess pain using appropriate pain scale  - Administer analgesics based on type and severity of pain and evaluate response  - Implement non-pharmacological measures as appropriate and evaluate response  - Consider cultural and social influences on pain and pain management  - Notify physician/advanced practitioner if interventions unsuccessful or patient reports new pain  Outcome: Progressing     Problem: INFECTION - ADULT  Goal: Absence or prevention of progression during hospitalization  Description: INTERVENTIONS:  - Assess and monitor for signs and symptoms of infection  - Monitor lab/diagnostic results  - Monitor all insertion sites, i e  indwelling lines, tubes, and drains  - Monitor endotracheal if appropriate and nasal secretions for changes in amount and color  - [place] appropriate cooling/warming therapies per order  - Administer medications as ordered  - Instruct and encourage patient and family to use good hand hygiene technique  - Identify and instruct in appropriate isolation precautions for identified infection/condition  Outcome: Progressing  Goal: Absence of fever/infection during neutropenic period  Description: INTERVENTIONS:  - Monitor WBC    Outcome: Progressing     Problem: SAFETY ADULT  Goal: Patient will remain free of falls  Description: INTERVENTIONS:  - Educate patient/family on patient safety including physical limitations  - Instruct patient to call for assistance with activity   - Consult OT/PT to assist with strengthening/mobility   - Keep Call bell within reach  - Keep bed low and locked with side rails adjusted as appropriate  - Keep care items and personal belongings within reach  - Initiate and maintain comfort rounds  - Make Fall Risk Sign visible to staff  - Offer Toileting every Hours, in advance of need  - Initiate/Maintain alarm  - Obtain necessary fall risk management equipment:   - Apply yellow socks and bracelet for high fall risk patients  - Consider moving patient to room near nurses station  Outcome: Progressing  Goal: Maintain or return to baseline ADL function  Description: INTERVENTIONS:  -  Assess patient's ability to carry out ADLs; assess patient's baseline for ADL function and identify physical deficits which impact ability to perform ADLs (bathing, care of mouth/teeth, toileting, grooming, dressing, etc )  - Assess/evaluate cause of self-care deficits   - Assess range of motion  - Assess patient's mobility; develop plan if impaired  - Assess patient's need for assistive devices and provide as appropriate  - Encourage maximum independence but intervene and supervise when necessary  - Involve family in performance of ADLs  - Assess for home care needs following discharge   - Consider OT consult to assist with ADL evaluation and planning for discharge  - Provide patient education as appropriate  Outcome: Progressing  Goal: Maintains/Returns to pre admission functional level  Description: INTERVENTIONS:  - Perform BMAT or MOVE assessment daily    - Set and communicate daily mobility goal to care team and patient/family/caregiver  - Collaborate with rehabilitation services on mobility goals if consulted  - Perform Range of Motion times a day  - Reposition patient every  hours    - Dangle patient  times a day  - Stand patient times a day  - Ambulate patient  times a day  - Out of bed to chair  times a day   - Out of bed for meals  times a day  - Out of bed for toileting  - Record patient progress and toleration of activity level   Outcome: Progressing     Problem: DISCHARGE PLANNING  Goal: Discharge to home or other facility with appropriate resources  Description: INTERVENTIONS:  - Identify barriers to discharge w/patient and caregiver  - Arrange for needed discharge resources and transportation as appropriate  - Identify discharge learning needs (meds, wound care, etc )  - Arrange for interpretive services to assist at discharge as needed  - Refer to Case Management Department for coordinating discharge planning if the patient needs post-hospital services based on physician/advanced practitioner order or complex needs related to functional status, cognitive ability, or social support system  Outcome: Progressing     Problem: Knowledge Deficit  Goal: Patient/family/caregiver demonstrates understanding of disease process, treatment plan, medications, and discharge instructions  Description: Complete learning assessment and assess knowledge base    Interventions:  - Provide teaching at level of understanding  - Provide teaching via preferred learning methods  Outcome: Progressing

## 2021-12-25 NOTE — ASSESSMENT & PLAN NOTE
· Most recent exacerbation in November, was discharged on a steroid taper  · On admission, c/o SOB with wheezing not improved with nebs  · Started on Solu-Medrol 40 mg q 8 hours and scheduled xopenex/atrovent with respiratory protocol   · Breathing improved but still with dyspnea and persistent wheezing, continue solumedrol 40 mg tid, can likely decrease to 40 bid tomorrow

## 2021-12-26 LAB
ANION GAP SERPL CALCULATED.3IONS-SCNC: 8 MMOL/L (ref 4–13)
BUN SERPL-MCNC: 36 MG/DL (ref 5–25)
CALCIUM SERPL-MCNC: 8.2 MG/DL (ref 8.3–10.1)
CHLORIDE SERPL-SCNC: 97 MMOL/L (ref 100–108)
CO2 SERPL-SCNC: 32 MMOL/L (ref 21–32)
CREAT SERPL-MCNC: 1.71 MG/DL (ref 0.6–1.3)
GFR SERPL CREATININE-BSD FRML MDRD: 44 ML/MIN/1.73SQ M
GLUCOSE SERPL-MCNC: 188 MG/DL (ref 65–140)
GLUCOSE SERPL-MCNC: 224 MG/DL (ref 65–140)
GLUCOSE SERPL-MCNC: 246 MG/DL (ref 65–140)
GLUCOSE SERPL-MCNC: 259 MG/DL (ref 65–140)
GLUCOSE SERPL-MCNC: 318 MG/DL (ref 65–140)
MAGNESIUM SERPL-MCNC: 2.4 MG/DL (ref 1.6–2.6)
POTASSIUM SERPL-SCNC: 3.5 MMOL/L (ref 3.5–5.3)
SODIUM SERPL-SCNC: 137 MMOL/L (ref 136–145)

## 2021-12-26 PROCEDURE — 83735 ASSAY OF MAGNESIUM: CPT | Performed by: PHYSICIAN ASSISTANT

## 2021-12-26 PROCEDURE — 82948 REAGENT STRIP/BLOOD GLUCOSE: CPT

## 2021-12-26 PROCEDURE — 80048 BASIC METABOLIC PNL TOTAL CA: CPT | Performed by: PHYSICIAN ASSISTANT

## 2021-12-26 PROCEDURE — 94760 N-INVAS EAR/PLS OXIMETRY 1: CPT

## 2021-12-26 PROCEDURE — 99233 SBSQ HOSP IP/OBS HIGH 50: CPT | Performed by: FAMILY MEDICINE

## 2021-12-26 PROCEDURE — 94640 AIRWAY INHALATION TREATMENT: CPT

## 2021-12-26 RX ORDER — FUROSEMIDE 10 MG/ML
60 INJECTION INTRAMUSCULAR; INTRAVENOUS DAILY
Status: DISCONTINUED | OUTPATIENT
Start: 2021-12-26 | End: 2021-12-27

## 2021-12-26 RX ORDER — FUROSEMIDE 10 MG/ML
60 INJECTION INTRAMUSCULAR; INTRAVENOUS DAILY
Status: DISCONTINUED | OUTPATIENT
Start: 2021-12-27 | End: 2021-12-26

## 2021-12-26 RX ADMIN — FUROSEMIDE 60 MG: 10 INJECTION, SOLUTION INTRAMUSCULAR; INTRAVENOUS at 10:55

## 2021-12-26 RX ADMIN — HEPARIN SODIUM 7500 UNITS: 5000 INJECTION INTRAVENOUS; SUBCUTANEOUS at 06:17

## 2021-12-26 RX ADMIN — IPRATROPIUM BROMIDE 0.5 MG: 0.5 SOLUTION RESPIRATORY (INHALATION) at 21:01

## 2021-12-26 RX ADMIN — MONTELUKAST 10 MG: 10 TABLET, FILM COATED ORAL at 22:58

## 2021-12-26 RX ADMIN — HEPARIN SODIUM 7500 UNITS: 5000 INJECTION INTRAVENOUS; SUBCUTANEOUS at 22:58

## 2021-12-26 RX ADMIN — GUAIFENESIN 600 MG: 600 TABLET, EXTENDED RELEASE ORAL at 17:01

## 2021-12-26 RX ADMIN — LEVALBUTEROL HYDROCHLORIDE 1.25 MG: 1.25 SOLUTION, CONCENTRATE RESPIRATORY (INHALATION) at 08:19

## 2021-12-26 RX ADMIN — LEVALBUTEROL HYDROCHLORIDE 1.25 MG: 1.25 SOLUTION, CONCENTRATE RESPIRATORY (INHALATION) at 13:58

## 2021-12-26 RX ADMIN — BUDESONIDE AND FORMOTEROL FUMARATE DIHYDRATE 2 PUFF: 160; 4.5 AEROSOL RESPIRATORY (INHALATION) at 17:01

## 2021-12-26 RX ADMIN — SODIUM CHLORIDE: 9 INJECTION, SOLUTION INTRAVENOUS at 17:01

## 2021-12-26 RX ADMIN — HEPARIN SODIUM 7500 UNITS: 5000 INJECTION INTRAVENOUS; SUBCUTANEOUS at 14:40

## 2021-12-26 RX ADMIN — SODIUM CHLORIDE: 9 INJECTION, SOLUTION INTRAVENOUS at 06:15

## 2021-12-26 RX ADMIN — LEVALBUTEROL HYDROCHLORIDE 1.25 MG: 1.25 SOLUTION, CONCENTRATE RESPIRATORY (INHALATION) at 21:01

## 2021-12-26 RX ADMIN — IPRATROPIUM BROMIDE 0.5 MG: 0.5 SOLUTION RESPIRATORY (INHALATION) at 13:58

## 2021-12-26 RX ADMIN — IPRATROPIUM BROMIDE 0.5 MG: 0.5 SOLUTION RESPIRATORY (INHALATION) at 08:19

## 2021-12-26 RX ADMIN — BUDESONIDE AND FORMOTEROL FUMARATE DIHYDRATE 2 PUFF: 160; 4.5 AEROSOL RESPIRATORY (INHALATION) at 09:36

## 2021-12-26 RX ADMIN — INSULIN GLARGINE 25 UNITS: 100 INJECTION, SOLUTION SUBCUTANEOUS at 22:59

## 2021-12-26 RX ADMIN — GUAIFENESIN 600 MG: 600 TABLET, EXTENDED RELEASE ORAL at 09:36

## 2021-12-26 RX ADMIN — ATORVASTATIN CALCIUM 10 MG: 10 TABLET, FILM COATED ORAL at 16:55

## 2021-12-26 NOTE — ASSESSMENT & PLAN NOTE
· Most recent exacerbation in November, was discharged on a steroid taper  · On admission, c/o SOB with wheezing not improved with nebs  · Started on Solu-Medrol 40 mg q 8 hours and scheduled xopenex/atrovent with respiratory protocol   · Breathing improved but still with dyspnea and persistent wheezing,   · Decreased to Solu-Medrol b i d

## 2021-12-26 NOTE — PLAN OF CARE
Problem: Potential for Falls  Goal: Patient will remain free of falls  Description: INTERVENTIONS:  - Educate patient/family on patient safety including physical limitations  - Instruct patient to call for assistance with activity   - Consult OT/PT to assist with strengthening/mobility   - Keep Call bell within reach  - Keep bed low and locked with side rails adjusted as appropriate  - Keep care items and personal belongings within reach  - Initiate and maintain comfort rounds  - Make Fall Risk Sign visible to staff  - Offer Toileting every  Hours, in advance of need  - Initiate/Maintain alarm  - Obtain necessary fall risk management equipment:   - Apply yellow socks and bracelet for high fall risk patients  - Consider moving patient to room near nurses station  Outcome: Progressing     Problem: Nutrition/Hydration-ADULT  Goal: Nutrient/Hydration intake appropriate for improving, restoring or maintaining nutritional needs  Description: Monitor and assess patient's nutrition/hydration status for malnutrition  Collaborate with interdisciplinary team and initiate plan and interventions as ordered  Monitor patient's weight and dietary intake as ordered or per policy  Utilize nutrition screening tool and intervene as necessary  Determine patient's food preferences and provide high-protein, high-caloric foods as appropriate       INTERVENTIONS:  - Monitor oral intake, urinary output, labs, and treatment plans  - Assess nutrition and hydration status and recommend course of action  - Evaluate amount of meals eaten  - Assist patient with eating if necessary   - Allow adequate time for meals  - Recommend/ encourage appropriate diets, oral nutritional supplements, and vitamin/mineral supplements  - Order, calculate, and assess calorie counts as needed  - Recommend, monitor, and adjust tube feedings and TPN/PPN based on assessed needs  - Assess need for intravenous fluids  - Provide specific nutrition/hydration education as appropriate  - Include patient/family/caregiver in decisions related to nutrition  Outcome: Progressing     Problem: PAIN - ADULT  Goal: Verbalizes/displays adequate comfort level or baseline comfort level  Description: Interventions:  - Encourage patient to monitor pain and request assistance  - Assess pain using appropriate pain scale  - Administer analgesics based on type and severity of pain and evaluate response  - Implement non-pharmacological measures as appropriate and evaluate response  - Consider cultural and social influences on pain and pain management  - Notify physician/advanced practitioner if interventions unsuccessful or patient reports new pain  Outcome: Progressing     Problem: INFECTION - ADULT  Goal: Absence or prevention of progression during hospitalization  Description: INTERVENTIONS:  - Assess and monitor for signs and symptoms of infection  - Monitor lab/diagnostic results  - Monitor all insertion sites, i e  indwelling lines, tubes, and drains  - Monitor endotracheal if appropriate and nasal secretions for changes in amount and color  - [place] appropriate cooling/warming therapies per order  - Administer medications as ordered  - Instruct and encourage patient and family to use good hand hygiene technique  - Identify and instruct in appropriate isolation precautions for identified infection/condition  Outcome: Progressing  Goal: Absence of fever/infection during neutropenic period  Description: INTERVENTIONS:  - Monitor WBC    Outcome: Progressing     Problem: SAFETY ADULT  Goal: Patient will remain free of falls  Description: INTERVENTIONS:  - Educate patient/family on patient safety including physical limitations  - Instruct patient to call for assistance with activity   - Consult OT/PT to assist with strengthening/mobility   - Keep Call bell within reach  - Keep bed low and locked with side rails adjusted as appropriate  - Keep care items and personal belongings within reach  - Initiate and maintain comfort rounds  - Make Fall Risk Sign visible to staff  - Offer Toileting every  Hours, in advance of need  - Initiate/Maintain alarm  - Obtain necessary fall risk management equipment:   - Apply yellow socks and bracelet for high fall risk patients  - Consider moving patient to room near nurses station  Outcome: Progressing  Goal: Maintain or return to baseline ADL function  Description: INTERVENTIONS:  -  Assess patient's ability to carry out ADLs; assess patient's baseline for ADL function and identify physical deficits which impact ability to perform ADLs (bathing, care of mouth/teeth, toileting, grooming, dressing, etc )  - Assess/evaluate cause of self-care deficits   - Assess range of motion  - Assess patient's mobility; develop plan if impaired  - Assess patient's need for assistive devices and provide as appropriate  - Encourage maximum independence but intervene and supervise when necessary  - Involve family in performance of ADLs  - Assess for home care needs following discharge   - Consider OT consult to assist with ADL evaluation and planning for discharge  - Provide patient education as appropriate  Outcome: Progressing  Goal: Maintains/Returns to pre admission functional level  Description: INTERVENTIONS:  - Perform BMAT or MOVE assessment daily    - Set and communicate daily mobility goal to care team and patient/family/caregiver  - Collaborate with rehabilitation services on mobility goals if consulted  - Perform Range of Motion times a day  - Reposition patient every  hours    - Dangle patient  times a day  - Stand patient  times a day  - Ambulate patient  times a day  - Out of bed to chair  times a day   - Out of bed for meals  times a day  - Out of bed for toileting  - Record patient progress and toleration of activity level   Outcome: Progressing     Problem: DISCHARGE PLANNING  Goal: Discharge to home or other facility with appropriate resources  Description: INTERVENTIONS:  - Identify barriers to discharge w/patient and caregiver  - Arrange for needed discharge resources and transportation as appropriate  - Identify discharge learning needs (meds, wound care, etc )  - Arrange for interpretive services to assist at discharge as needed  - Refer to Case Management Department for coordinating discharge planning if the patient needs post-hospital services based on physician/advanced practitioner order or complex needs related to functional status, cognitive ability, or social support system  Outcome: Progressing     Problem: Knowledge Deficit  Goal: Patient/family/caregiver demonstrates understanding of disease process, treatment plan, medications, and discharge instructions  Description: Complete learning assessment and assess knowledge base    Interventions:  - Provide teaching at level of understanding  - Provide teaching via preferred learning methods  Outcome: Progressing

## 2021-12-26 NOTE — ASSESSMENT & PLAN NOTE
Lab Results   Component Value Date    EGFR 44 12/26/2021    EGFR 48 12/25/2021    EGFR 49 12/24/2021    CREATININE 1 71 (H) 12/26/2021    CREATININE 1 59 (H) 12/25/2021    CREATININE 1 56 (H) 12/24/2021   · Baseline creatinine 1 1-1 5  · Creatinine today 1 52  · Monitor kidney function while receiving diuretics  · Avoid nephrotoxins and hypotension

## 2021-12-26 NOTE — ASSESSMENT & PLAN NOTE
Wt Readings from Last 3 Encounters:   12/26/21 (!) 150 kg (331 lb 9 6 oz)   12/16/21 (!) 154 kg (339 lb)   11/22/21 (!) 158 kg (348 lb)     · Presented with TELLES/SOB, increasing LE edema, and orthopnea   Multiple recent admissions for CHF exacerbation triggered by asthma exacerbation  · Echo 11/12/21 showed ejection fraction 55% and grade 1 diastolic dysfunction  · Current home regimen is Lasix 40 mg b i d  which was recently increased by OP cardiology given repeated admissions for HF exacerbations in setting of asthma exacerbation  · Will need to reschedule outpatient stress test/ischemic workup upon discharge  · Good response to diuresis with 60 mg IV lasix bid, continue for now, improvement in LE edema and orthopnea however still appears hypervolemic  · Intake and output, daily weights, fluid restriction  · With elevated creatinine, will decrease lasix to 60 mg IV daily

## 2021-12-26 NOTE — PROGRESS NOTES
[facility]  Progress Note - Amber Haro 1965, 49 y o  male MRN: [id number]  Unit/Bed#: CW2 218-01 Encounter: [id number]  Primary Care Provider: Maggie Loredo MD   Date and time admitted to hospital: 12/23/2021  6:07 AM    * Acute on chronic diastolic heart failure [facility])  Assessment & Plan  Wt Readings from Last 3 Encounters:   12/26/21 (!) 150 kg (331 lb 9 6 oz)   12/16/21 (!) 154 kg (339 lb)   11/22/21 (!) 158 kg (348 lb)     · Presented with TELLES/SOB, increasing LE edema, and orthopnea   Multiple recent admissions for CHF exacerbation triggered by asthma exacerbation  · Echo 11/12/21 showed ejection fraction 55% and grade 1 diastolic dysfunction  · Current home regimen is Lasix 40 mg b i d  which was recently increased by OP cardiology given repeated admissions for HF exacerbations in setting of asthma exacerbation  · Will need to reschedule outpatient stress test/ischemic workup upon discharge  · Good response to diuresis with 60 mg IV lasix bid, continue for now, improvement in LE edema and orthopnea however still appears hypervolemic  · Intake and output, daily weights, fluid restriction  · With elevated creatinine, will decrease lasix to 60 mg IV daily    Stage 3 chronic kidney disease [facility])  Assessment & Plan  Lab Results   Component Value Date    EGFR 44 12/26/2021    EGFR 48 12/25/2021    EGFR 49 12/24/2021    CREATININE 1 71 (H) 12/26/2021    CREATININE 1 59 (H) 12/25/2021    CREATININE 1 56 (H) 12/24/2021   · Baseline creatinine 1 1-1 5  · Creatinine today 1 52  · Monitor kidney function while receiving diuretics  · Avoid nephrotoxins and hypotension    Moderate persistent asthma with acute exacerbation  Assessment & Plan  · Most recent exacerbation in November, was discharged on a steroid taper  · On admission, c/o SOB with wheezing not improved with nebs  · Started on Solu-Medrol 40 mg q 8 hours and scheduled xopenex/atrovent with respiratory protocol · Breathing improved but still with dyspnea and persistent wheezing,   · Decreased to Solu-Medrol b i d     VICKY (obstructive sleep apnea)  Assessment & Plan  · Patient cannot tolerate hospital CPAP  · Continue home CPAP as outpatient     Morbid obesity ([address] )  Assessment & Plan  · Dietary, lifestyle modifications encouraged    Type 2 diabetes mellitus with hyperglycemia [facility])  Assessment & Plan  Lab Results   Component Value Date    HGBA1C 7 0 (H) 11/10/2021       Recent Labs     21  1036 21  1543 21  2113 21  0638   POCGLU 424* 263* 359* 259*       Blood Sugar Average: Last 72 hrs:  · - Increase lantus from to 25 units, and humalog 10 units TID with meals  · Avoid hypoglycemia-protocol in place  · Trend BGs, increase insulin as necessary    Essential hypertension  Assessment & Plan  · Above goal on admission  · PRN hydralazine 5 mg q6h prn for SBP>130  · Trend BPs while receiving IV diuresis      VTE Pharmacologic Prophylaxis:   Pharmacologic: Heparin  Mechanical VTE Prophylaxis in Place: Yes    Patient Centered Rounds: I have performed bedside rounds with nursing staff today  Current Length of Stay: 3 day(s)    Current Patient Status: Inpatient   Certification Statement: The patient will continue to require additional inpatient hospital stay due to pending progress    Discharge Plan: pending progress    Code Status: Level 1 - Full Code      Subjective:   Patient sitting up, no distress  On exam still have some lower extremity edema and wheezing    Objective:     Vitals:   Temp (24hrs), Av 1 °F (36 7 °C), Min:97 5 °F (36 4 °C), Max:98 9 °F (37 2 °C)    Temp:  [97 5 °F (36 4 °C)-98 9 °F (37 2 °C)] 98 3 °F (36 8 °C)  Resp:  [18-20] 20  BP: (140-159)/(81-99) 146/81  SpO2:  [97 %] 97 %  Body mass index is 43 76 kg/m²  Input and Output Summary (last 24 hours):        Intake/Output Summary (Last 24 hours) at 2021 0848  Last data filed at 2021 2116  Gross per 24 hour Intake 220 ml   Output 1400 ml   Net -1180 ml       Physical Exam:     Physical Exam  Vitals and nursing note reviewed  Constitutional:       General: He is not in acute distress  Appearance: Normal appearance  HENT:      Head: Normocephalic and atraumatic  Right Ear: External ear normal       Left Ear: External ear normal       Nose: Nose normal    Eyes:      Extraocular Movements: Extraocular movements intact  Pulmonary:      Effort: Pulmonary effort is normal    Musculoskeletal:      Cervical back: Normal range of motion  Right lower leg: Edema present  Left lower leg: Edema present  Neurological:      Mental Status: He is alert  Mental status is at baseline     Psychiatric:         Mood and Affect: Mood normal             Labs:    Results from last 7 days   Lab Units 12/25/21  0438   WBC Thousand/uL 10 91*   HEMOGLOBIN g/dL 11 9*   HEMATOCRIT % 37 1   PLATELETS Thousands/uL 197   NEUTROS PCT % 89*   LYMPHS PCT % 6*   MONOS PCT % 4   EOS PCT % 0     Results from last 7 days   Lab Units 12/26/21  0639   SODIUM mmol/L 137   POTASSIUM mmol/L 3 5   CHLORIDE mmol/L 97*   CO2 mmol/L 32   BUN mg/dL 36*   CREATININE mg/dL 1 71*   ANION GAP mmol/L 8   CALCIUM mg/dL 8 2*   GLUCOSE RANDOM mg/dL 246*         Results from last 7 days   Lab Units 12/26/21  0638 12/25/21  2113 12/25/21  1543 12/25/21  1036 12/25/21  0635 12/24/21  2051 12/24/21  1717 12/24/21  1102 12/24/21  0555 12/23/21  2121 12/23/21  1642   POC GLUCOSE mg/dl 259* 359* 263* 424* 317* 242* 292* 342* 296* 366* 375*                Recent Cultures (last 7 days):           Last 24 Hours Medication List:   Current Facility-Administered Medications   Medication Dose Route Frequency Provider Last Rate    acetaminophen  650 mg Oral Q6H PRN Brie Van MD      atorvastatin  10 mg Oral Daily With Janey Alexander MD      budesonide-formoterol  2 puff Inhalation BID Brie Van MD      [START ON 12/27/2021] furosemide  60 mg Intravenous Daily Daniel Francois MD      guaiFENesin  600 mg Oral BID Lakeshia Harris MD      heparin (porcine)  7,500 Units Subcutaneous Q8H Martínez Tirado MD      hydrALAZINE  5 mg Intravenous Q6H PRN Kevin Capellan MD      insulin glargine  25 Units Subcutaneous HS Daniel Francois MD      insulin lispro  10 Units Subcutaneous TID With Meals Daniel Francois MD      insulin lispro  2-12 Units Subcutaneous 4x Daily (with meals and at bedtime) Carmela Kennedy PA-C      ipratropium  0 5 mg Nebulization TID Lakeshia Harris MD     [facility] levalbuterol  1 25 mg Nebulization TID Lakeshia Harris MD      IVPB builder   Intravenous Ana Luisa Saleem  mL/hr at 12/26/21 0615    montelukast  10 mg Oral HS Lakeshia Harris MD      sodium chloride  1 spray Each Nare Q1H PRN Lakeshia Harris MD          Today, Patient Was Seen By: Daniel Francois MD    ** Please Note: Dictation voice to text software may have been used in the creation of this document   **

## 2021-12-26 NOTE — PLAN OF CARE
Problem: Potential for Falls  Goal: Patient will remain free of falls  Description: INTERVENTIONS:  - Educate patient/family on patient safety including physical limitations  - Instruct patient to call for assistance with activity   - Consult OT/PT to assist with strengthening/mobility   - Keep Call bell within reach  - Keep bed low and locked with side rails adjusted as appropriate  - Keep care items and personal belongings within reach  - Initiate and maintain comfort rounds  - Make Fall Risk Sign visible to staff  - Apply yellow socks and bracelet for high fall risk patients  - Consider moving patient to room near nurses station  Outcome: Progressing     Problem: Nutrition/Hydration-ADULT  Goal: Nutrient/Hydration intake appropriate for improving, restoring or maintaining nutritional needs  Description: Monitor and assess patient's nutrition/hydration status for malnutrition  Collaborate with interdisciplinary team and initiate plan and interventions as ordered  Monitor patient's weight and dietary intake as ordered or per policy  Utilize nutrition screening tool and intervene as necessary  Determine patient's food preferences and provide high-protein, high-caloric foods as appropriate       INTERVENTIONS:  - Monitor oral intake, urinary output, labs, and treatment plans  - Assess nutrition and hydration status and recommend course of action  - Evaluate amount of meals eaten  - Assist patient with eating if necessary   - Allow adequate time for meals  - Recommend/ encourage appropriate diets, oral nutritional supplements, and vitamin/mineral supplements  - Order, calculate, and assess calorie counts as needed  - Recommend, monitor, and adjust tube feedings and TPN/PPN based on assessed needs  - Assess need for intravenous fluids  - Provide specific nutrition/hydration education as appropriate  - Include patient/family/caregiver in decisions related to nutrition  Outcome: Progressing     Problem: PAIN - ADULT  Goal: Verbalizes/displays adequate comfort level or baseline comfort level  Description: Interventions:  - Encourage patient to monitor pain and request assistance  - Assess pain using appropriate pain scale  - Administer analgesics based on type and severity of pain and evaluate response  - Implement non-pharmacological measures as appropriate and evaluate response  - Consider cultural and social influences on pain and pain management  - Notify physician/advanced practitioner if interventions unsuccessful or patient reports new pain  Outcome: Progressing     Problem: INFECTION - ADULT  Goal: Absence or prevention of progression during hospitalization  Description: INTERVENTIONS:  - Assess and monitor for signs and symptoms of infection  - Monitor lab/diagnostic results  - Monitor all insertion sites, i e  indwelling lines, tubes, and drains  - Monitor endotracheal if appropriate and nasal secretions for changes in amount and color  - [place] appropriate cooling/warming therapies per order  - Administer medications as ordered  - Instruct and encourage patient and family to use good hand hygiene technique  - Identify and instruct in appropriate isolation precautions for identified infection/condition  Outcome: Progressing

## 2021-12-27 LAB
ANION GAP SERPL CALCULATED.3IONS-SCNC: 6 MMOL/L (ref 4–13)
BUN SERPL-MCNC: 34 MG/DL (ref 5–25)
CALCIUM SERPL-MCNC: 8 MG/DL (ref 8.3–10.1)
CHLORIDE SERPL-SCNC: 100 MMOL/L (ref 100–108)
CO2 SERPL-SCNC: 31 MMOL/L (ref 21–32)
CREAT SERPL-MCNC: 1.48 MG/DL (ref 0.6–1.3)
ERYTHROCYTE [DISTWIDTH] IN BLOOD BY AUTOMATED COUNT: 13.5 % (ref 11.6–15.1)
GFR SERPL CREATININE-BSD FRML MDRD: 52 ML/MIN/1.73SQ M
GLUCOSE SERPL-MCNC: 186 MG/DL (ref 65–140)
GLUCOSE SERPL-MCNC: 220 MG/DL (ref 65–140)
GLUCOSE SERPL-MCNC: 220 MG/DL (ref 65–140)
GLUCOSE SERPL-MCNC: 237 MG/DL (ref 65–140)
GLUCOSE SERPL-MCNC: 241 MG/DL (ref 65–140)
HCT VFR BLD AUTO: 39 % (ref 36.5–49.3)
HGB BLD-MCNC: 12.6 G/DL (ref 12–17)
MCH RBC QN AUTO: 30.3 PG (ref 26.8–34.3)
MCHC RBC AUTO-ENTMCNC: 32.3 G/DL (ref 31.4–37.4)
MCV RBC AUTO: 94 FL (ref 82–98)
PLATELET # BLD AUTO: 204 THOUSANDS/UL (ref 149–390)
PMV BLD AUTO: 11 FL (ref 8.9–12.7)
POTASSIUM SERPL-SCNC: 3.5 MMOL/L (ref 3.5–5.3)
RBC # BLD AUTO: 4.16 MILLION/UL (ref 3.88–5.62)
SODIUM SERPL-SCNC: 137 MMOL/L (ref 136–145)
WBC # BLD AUTO: 9.84 THOUSAND/UL (ref 4.31–10.16)

## 2021-12-27 PROCEDURE — 82948 REAGENT STRIP/BLOOD GLUCOSE: CPT

## 2021-12-27 PROCEDURE — 85027 COMPLETE CBC AUTOMATED: CPT | Performed by: FAMILY MEDICINE

## 2021-12-27 PROCEDURE — 94640 AIRWAY INHALATION TREATMENT: CPT

## 2021-12-27 PROCEDURE — 99232 SBSQ HOSP IP/OBS MODERATE 35: CPT | Performed by: FAMILY MEDICINE

## 2021-12-27 PROCEDURE — 94760 N-INVAS EAR/PLS OXIMETRY 1: CPT

## 2021-12-27 PROCEDURE — 80048 BASIC METABOLIC PNL TOTAL CA: CPT | Performed by: FAMILY MEDICINE

## 2021-12-27 PROCEDURE — 99223 1ST HOSP IP/OBS HIGH 75: CPT | Performed by: NURSE PRACTITIONER

## 2021-12-27 RX ORDER — POTASSIUM CHLORIDE 20 MEQ/1
20 TABLET, EXTENDED RELEASE ORAL 2 TIMES DAILY
Status: DISCONTINUED | OUTPATIENT
Start: 2021-12-27 | End: 2021-12-28

## 2021-12-27 RX ORDER — POTASSIUM CHLORIDE 20 MEQ/1
40 TABLET, EXTENDED RELEASE ORAL ONCE
Status: COMPLETED | OUTPATIENT
Start: 2021-12-27 | End: 2021-12-27

## 2021-12-27 RX ORDER — FUROSEMIDE 10 MG/ML
60 INJECTION INTRAMUSCULAR; INTRAVENOUS
Status: DISCONTINUED | OUTPATIENT
Start: 2021-12-27 | End: 2021-12-27

## 2021-12-27 RX ORDER — BUMETANIDE 0.25 MG/ML
2 INJECTION, SOLUTION INTRAMUSCULAR; INTRAVENOUS 2 TIMES DAILY
Status: DISCONTINUED | OUTPATIENT
Start: 2021-12-27 | End: 2021-12-31

## 2021-12-27 RX ADMIN — HEPARIN SODIUM 7500 UNITS: 5000 INJECTION INTRAVENOUS; SUBCUTANEOUS at 05:32

## 2021-12-27 RX ADMIN — SODIUM CHLORIDE: 9 INJECTION, SOLUTION INTRAVENOUS at 05:32

## 2021-12-27 RX ADMIN — POTASSIUM CHLORIDE 20 MEQ: 1500 TABLET, EXTENDED RELEASE ORAL at 17:09

## 2021-12-27 RX ADMIN — HEPARIN SODIUM 7500 UNITS: 5000 INJECTION INTRAVENOUS; SUBCUTANEOUS at 22:17

## 2021-12-27 RX ADMIN — MONTELUKAST 10 MG: 10 TABLET, FILM COATED ORAL at 22:17

## 2021-12-27 RX ADMIN — IPRATROPIUM BROMIDE 0.5 MG: 0.5 SOLUTION RESPIRATORY (INHALATION) at 20:22

## 2021-12-27 RX ADMIN — ATORVASTATIN CALCIUM 10 MG: 10 TABLET, FILM COATED ORAL at 17:08

## 2021-12-27 RX ADMIN — BUDESONIDE AND FORMOTEROL FUMARATE DIHYDRATE 2 PUFF: 160; 4.5 AEROSOL RESPIRATORY (INHALATION) at 17:10

## 2021-12-27 RX ADMIN — BUMETANIDE 2 MG: 0.25 INJECTION INTRAMUSCULAR; INTRAVENOUS at 17:25

## 2021-12-27 RX ADMIN — GUAIFENESIN 600 MG: 600 TABLET, EXTENDED RELEASE ORAL at 08:56

## 2021-12-27 RX ADMIN — IPRATROPIUM BROMIDE 0.5 MG: 0.5 SOLUTION RESPIRATORY (INHALATION) at 14:15

## 2021-12-27 RX ADMIN — POTASSIUM CHLORIDE 40 MEQ: 1500 TABLET, EXTENDED RELEASE ORAL at 14:52

## 2021-12-27 RX ADMIN — BUDESONIDE AND FORMOTEROL FUMARATE DIHYDRATE 2 PUFF: 160; 4.5 AEROSOL RESPIRATORY (INHALATION) at 09:00

## 2021-12-27 RX ADMIN — GUAIFENESIN 600 MG: 600 TABLET, EXTENDED RELEASE ORAL at 17:08

## 2021-12-27 RX ADMIN — LEVALBUTEROL HYDROCHLORIDE 1.25 MG: 1.25 SOLUTION, CONCENTRATE RESPIRATORY (INHALATION) at 08:49

## 2021-12-27 RX ADMIN — IPRATROPIUM BROMIDE 0.5 MG: 0.5 SOLUTION RESPIRATORY (INHALATION) at 08:48

## 2021-12-27 RX ADMIN — LEVALBUTEROL HYDROCHLORIDE 1.25 MG: 1.25 SOLUTION, CONCENTRATE RESPIRATORY (INHALATION) at 20:21

## 2021-12-27 RX ADMIN — HEPARIN SODIUM 7500 UNITS: 5000 INJECTION INTRAVENOUS; SUBCUTANEOUS at 13:05

## 2021-12-27 RX ADMIN — INSULIN GLARGINE 25 UNITS: 100 INJECTION, SOLUTION SUBCUTANEOUS at 22:17

## 2021-12-27 RX ADMIN — SODIUM CHLORIDE: 9 INJECTION, SOLUTION INTRAVENOUS at 17:20

## 2021-12-27 RX ADMIN — FUROSEMIDE 60 MG: 10 INJECTION, SOLUTION INTRAMUSCULAR; INTRAVENOUS at 08:57

## 2021-12-27 RX ADMIN — LEVALBUTEROL HYDROCHLORIDE 1.25 MG: 1.25 SOLUTION, CONCENTRATE RESPIRATORY (INHALATION) at 14:15

## 2021-12-27 NOTE — PLAN OF CARE
Problem: Potential for Falls  Goal: Patient will remain free of falls  Description: INTERVENTIONS:  - Educate patient/family on patient safety including physical limitations  - Instruct patient to call for assistance with activity   - Consult OT/PT to assist with strengthening/mobility   - Keep Call bell within reach  - Keep bed low and locked with side rails adjusted as appropriate  - Keep care items and personal belongings within reach  - Initiate and maintain comfort rounds  - Make Fall Risk Sign visible to staff  - Apply yellow socks and bracelet for high fall risk patients  - Consider moving patient to room near nurses station  Outcome: Progressing     Problem: Nutrition/Hydration-ADULT  Goal: Nutrient/Hydration intake appropriate for improving, restoring or maintaining nutritional needs  Description: Monitor and assess patient's nutrition/hydration status for malnutrition  Collaborate with interdisciplinary team and initiate plan and interventions as ordered  Monitor patient's weight and dietary intake as ordered or per policy  Utilize nutrition screening tool and intervene as necessary  Determine patient's food preferences and provide high-protein, high-caloric foods as appropriate       INTERVENTIONS:  - Monitor oral intake, urinary output, labs, and treatment plans  - Assess nutrition and hydration status and recommend course of action  - Evaluate amount of meals eaten  - Assist patient with eating if necessary   - Allow adequate time for meals  - Recommend/ encourage appropriate diets, oral nutritional supplements, and vitamin/mineral supplements  - Order, calculate, and assess calorie counts as needed  - Recommend, monitor, and adjust tube feedings and TPN/PPN based on assessed needs  - Assess need for intravenous fluids  - Provide specific nutrition/hydration education as appropriate  - Include patient/family/caregiver in decisions related to nutrition  Outcome: Progressing     Problem: PAIN - ADULT  Goal: Verbalizes/displays adequate comfort level or baseline comfort level  Description: Interventions:  - Encourage patient to monitor pain and request assistance  - Assess pain using appropriate pain scale  - Administer analgesics based on type and severity of pain and evaluate response  - Implement non-pharmacological measures as appropriate and evaluate response  - Consider cultural and social influences on pain and pain management  - Notify physician/advanced practitioner if interventions unsuccessful or patient reports new pain  Outcome: Progressing

## 2021-12-27 NOTE — UTILIZATION REVIEW
Continued Stay Review    Date: 12/26/2021                          Current Patient Class: Inpatient  Current Level of Care: Med/Surg    HPI:56 y o  male initially admitted on 12/23/2021    Assessment/Plan: Acute on Chronic Diastolic heart failure  Good response to diuresis with 60 mg IV lasix bid, continue for now, improvement in LE edema and orthopnea however still appears hypervolemic  Intake and output, daily weights, fluid restriction  With elevated creatinine, will decrease lasix to 60 mg IV daily  Monitor kidney function while receiving diuretics  Decrease Solumedrol BID  VTE Pharmacologic Prophylaxis:   Pharmacologic: Heparin  Mechanical VTE Prophylaxis in Place: Yes    Vital Signs:   12/26/21 2304 -- -- -- -- -- 95 % None (Room air) --   12/26/21 2102 -- -- -- -- -- 95 % -- --   12/26/21 19:43:11 98 3 °F (36 8 °C) -- -- 154/93 113 -- -- --   12/26/21 19:41:46 98 3 °F (36 8 °C) -- -- 92/79 83 -- -- --   12/26/21 15:28:47 97 6 °F (36 4 °C) -- -- 141/76 98 -- -- --   12/26/21 12:02:25 -- -- -- 143/68 93 -- -- --   12/26/21 0819 -- -- -- -- -- 97 % None (Room air) --   12/26/21 07:19:57 98 3 °F (36 8 °C) -- -- 146/81 103 -- -- --   12/26/21 06:34:57 -- -- 20 159/99 119 -- -- --       Pertinent Labs/Diagnostic Results:   Results from last 7 days   Lab Units 12/23/21  0637   SARS-COV-2  Negative     Results from last 7 days   Lab Units 12/27/21  0545 12/25/21  0438 12/24/21  0439 12/23/21  0630 12/23/21  0630   WBC Thousand/uL 9 84 10 91* 11 25*   < > 9 87   HEMOGLOBIN g/dL 12 6 11 9* 12 4  --  13 1   HEMATOCRIT % 39 0 37 1 38 0  --  39 8   PLATELETS Thousands/uL 204 197 233   < > 263   NEUTROS ABS Thousands/µL  --  9 60* 9 93*  --  7 16    < > = values in this interval not displayed       Results from last 7 days   Lab Units 12/27/21  0545 12/26/21  0639 12/25/21  0438 12/24/21  0439 12/23/21  0630   SODIUM mmol/L 137 137 135* 132* 134*   POTASSIUM mmol/L 3 5 3 5 3 9 3 7 3 7   CHLORIDE mmol/L 100 97* 97* 95* 96*   CO2 mmol/L 31 32 32 30 31   ANION GAP mmol/L 6 8 6 7 7   BUN mg/dL 34* 36* 33* 24 16   CREATININE mg/dL 1 48* 1 71* 1 59* 1 56* 1 52*   EGFR ml/min/1 73sq m 52 44 48 49 51   CALCIUM mg/dL 8 0* 8 2* 8 0* 9 2 8 3   MAGNESIUM mg/dL  --  2 4  --  2 1  --      Results from last 7 days   Lab Units 12/27/21  1600 12/27/21  1030 12/27/21  0638 12/26/21  2117 12/26/21  1605 12/26/21  1104 12/26/21  0638 12/25/21  2113 12/25/21  1543 12/25/21  1036 12/25/21  0635 12/24/21  2051   POC GLUCOSE mg/dl 237* 220* 220* 188* 224* 318* 259* 359* 263* 424* 317* 242*     Results from last 7 days   Lab Units 12/27/21  0545 12/26/21  0639 12/25/21  0438 12/24/21  0439 12/23/21  0630   GLUCOSE RANDOM mg/dL 241* 246* 296* 298* 187*     Results from last 7 days   Lab Units 12/23/21  1229 12/23/21  0630   HS TNI 0HR ng/L  --  20   HS TNI 2HR ng/L 19  --    HSTNI D2 ng/L -1  --        Results from last 7 days   Lab Units 12/23/21  0637   INFLUENZA A PCR  Negative   INFLUENZA B PCR  Negative   RSV PCR  Negative     Medications:   Scheduled Medications:  atorvastatin, 10 mg, Oral, Daily With Dinner  budesonide-formoterol, 2 puff, Inhalation, BID  bumetanide, 2 mg, Intravenous, BID  guaiFENesin, 600 mg, Oral, BID  heparin (porcine), 7,500 Units, Subcutaneous, Q8H [address]  insulin glargine, 25 Units, Subcutaneous, HS  insulin lispro, 10 Units, Subcutaneous, TID With Meals  insulin lispro, 2-12 Units, Subcutaneous, 4x Daily (with meals and at bedtime)  ipratropium, 0 5 mg, Nebulization, TID  levalbuterol, 1 25 mg, Nebulization, TID  IVPB builder, , Intravenous, Q12H  montelukast, 10 mg, Oral, HS  potassium chloride, 20 mEq, Oral, BID      Continuous IV Infusions:     PRN Meds:  acetaminophen, 650 mg, Oral, Q6H PRN  hydrALAZINE, 5 mg, Intravenous, Q6H PRN  sodium chloride, 1 spray, Each Nare, Q1H PRN        Discharge Plan: [facility]    Network Utilization Review Department  ATTENTION: Please call with any questions or concerns to [phone] and carefully listen to the prompts so that you are directed to the right person  All voicemails are confidential   Valeta Pastel all requests for admission clinical reviews, approved or denied determinations and any other requests to dedicated fax number below belonging to the campus where the patient is receiving treatment   List of dedicated fax numbers for the Facilities:  [address] DENIALS (Administrative/Medical Necessity) [phone]   [address] (Maternity/NICU/Pediatrics) [phone]   [address]  [address] [facility] [place] [address] [address] [place] [address] [place] [address] [place], [place] [address] [phone]

## 2021-12-27 NOTE — ASSESSMENT & PLAN NOTE
Lab Results   Component Value Date    EGFR 52 12/27/2021    EGFR 44 12/26/2021    EGFR 48 12/25/2021    CREATININE 1 48 (H) 12/27/2021    CREATININE 1 71 (H) 12/26/2021    CREATININE 1 59 (H) 12/25/2021   · Baseline creatinine 1 1-1 5  · Creatinine today 1 52  · Monitor kidney function while receiving diuretics  · Avoid nephrotoxins and hypotension

## 2021-12-27 NOTE — ASSESSMENT & PLAN NOTE
Lab Results   Component Value Date    HGBA1C 7 0 (H) 11/10/2021       Recent Labs     12/26/21  1605 12/26/21  2117 12/27/21  0638 12/27/21  1030   POCGLU 224* 188* 220* 220*       Blood Sugar Average: Last 72 hrs:  · 12/25- Increase lantus from to 25 units, and humalog 10 units TID with meals  · Avoid hypoglycemia-protocol in place  · Trend BGs, increase insulin as necessary

## 2021-12-27 NOTE — ASSESSMENT & PLAN NOTE
· Most recent exacerbation in November, was discharged on a steroid taper  · On admission, c/o SOB with wheezing not improved with nebs  · Started on Solu-Medrol 40 mg q 8 hours and scheduled xopenex/atrovent with respiratory protocol   · Continue symbicort  · Breathing improved but still with dyspnea and persistent wheezing,   · Decreased to Solu-Medrol b i d

## 2021-12-27 NOTE — PROGRESS NOTES
[facility]  Progress Note - Allison Forget 1965, 49 y o  male MRN: [id number]  Unit/Bed#: CW2 218-01 Encounter: [id number]  Primary Care Provider: Maxime Najera MD   Date and time admitted to hospital: 12/23/2021  6:07 AM    * Acute on chronic diastolic heart failure [facility])  Assessment & Plan  Wt Readings from Last 3 Encounters:   12/26/21 (!) 150 kg (331 lb 9 6 oz)   12/16/21 (!) 154 kg (339 lb)   11/22/21 (!) 158 kg (348 lb)     · Presented with TELLES/SOB, increasing LE edema, and orthopnea   Multiple recent admissions for CHF exacerbation triggered by asthma exacerbation  · Echo 11/12/21 showed ejection fraction 55% and grade 1 diastolic dysfunction  · Current home regimen is Lasix 40 mg b i d  which was recently increased by OP cardiology given repeated admissions for HF exacerbations in setting of asthma exacerbation  · Will need to reschedule outpatient stress test/ischemic workup upon discharge  · Intake and output, daily weights, fluid restriction  · Increase lasix IV 60 mg to BID due to no significant output   · Consult cardio    Stage 3 chronic kidney disease [facility])  Assessment & Plan  Lab Results   Component Value Date    EGFR 52 12/27/2021    EGFR 44 12/26/2021    EGFR 48 12/25/2021    CREATININE 1 48 (H) 12/27/2021    CREATININE 1 71 (H) 12/26/2021    CREATININE 1 59 (H) 12/25/2021   · Baseline creatinine 1 1-1 5  · Creatinine today 1 52  · Monitor kidney function while receiving diuretics  · Avoid nephrotoxins and hypotension    Moderate persistent asthma with acute exacerbation  Assessment & Plan  · Most recent exacerbation in November, was discharged on a steroid taper  · On admission, c/o SOB with wheezing not improved with nebs  · Started on Solu-Medrol 40 mg q 8 hours and scheduled xopenex/atrovent with respiratory protocol   · Continue symbicort  · Breathing improved but still with dyspnea and persistent wheezing,   · Decreased to Solu-Medrol b i d     VICKY (obstructive sleep apnea)  Assessment & Plan  · Patient cannot tolerate hospital CPAP  · Continue home CPAP as outpatient     Morbid obesity ([address] )  Assessment & Plan  · Dietary, lifestyle modifications encouraged    Type 2 diabetes mellitus with hyperglycemia [facility])  Assessment & Plan  Lab Results   Component Value Date    HGBA1C 7 0 (H) 11/10/2021       Recent Labs     21  1605 21  2117 21  0638 21  1030   POCGLU 224* 188* 220* 220*       Blood Sugar Average: Last 72 hrs:  · - Increase lantus from to 25 units, and humalog 10 units TID with meals  · Avoid hypoglycemia-protocol in place  · Trend BGs, increase insulin as necessary    Essential hypertension  Assessment & Plan  · Above goal on admission  · PRN hydralazine 5 mg q6h prn for SBP>130  · Trend BPs while receiving IV diuresis         VTE Pharmacologic Prophylaxis:   Pharmacologic: Heparin  Mechanical VTE Prophylaxis in Place: Yes    Patient Centered Rounds: I have performed bedside rounds with nursing staff today  Current Length of Stay: 4 day(s)    Current Patient Status: Inpatient   Certification Statement: The patient will continue to require additional inpatient hospital stay due to pending progress    Discharge Plan: likely 48-72 hours    Code Status: Level 1 - Full Code      Subjective:   Patient reports of improvement in breathing, continues to have wheezing and dyspnea on exertion  Patient states his lower extremity edema slightly worse today  Objective:     Vitals:   Temp (24hrs), Av °F (36 7 °C), Min:97 4 °F (36 3 °C), Max:98 6 °F (37 °C)    Temp:  [97 4 °F (36 3 °C)-98 6 °F (37 °C)] 97 4 °F (36 3 °C)  HR:  [83] 83  Resp:  [18] 18  BP: ()/(59-93) 135/75  SpO2:  [95 %] 95 %  Body mass index is 43 76 kg/m²  Input and Output Summary (last 24 hours):        Intake/Output Summary (Last 24 hours) at 2021 1304  Last data filed at 2021 1001  Gross per 24 hour   Intake 760 ml Output 2875 ml   Net -2115 ml       Physical Exam:     Physical Exam  Vitals and nursing note reviewed  Constitutional:       General: He is not in acute distress  Appearance: Normal appearance  He is obese  HENT:      Head: Normocephalic and atraumatic  Right Ear: External ear normal       Left Ear: External ear normal       Nose: Nose normal    Eyes:      Extraocular Movements: Extraocular movements intact  Pulmonary:      Effort: Pulmonary effort is normal  No respiratory distress  Breath sounds: Wheezing present  Musculoskeletal:      Cervical back: Normal range of motion  Right lower leg: Edema present  Left lower leg: Edema present  Neurological:      Mental Status: He is alert  Mental status is at baseline  Psychiatric:         Mood and Affect: Mood normal           Additional Data:     Labs:    Results from last 7 days   Lab Units 12/27/21  0545 12/25/21  0438 12/25/21  0438   WBC Thousand/uL 9 84   < > 10 91*   HEMOGLOBIN g/dL 12 6   < > 11 9*   HEMATOCRIT % 39 0   < > 37 1   PLATELETS Thousands/uL 204   < > 197   NEUTROS PCT %  --   --  89*   LYMPHS PCT %  --   --  6*   MONOS PCT %  --   --  4   EOS PCT %  --   --  0    < > = values in this interval not displayed       Results from last 7 days   Lab Units 12/27/21  0545   SODIUM mmol/L 137   POTASSIUM mmol/L 3 5   CHLORIDE mmol/L 100   CO2 mmol/L 31   BUN mg/dL 34*   CREATININE mg/dL 1 48*   ANION GAP mmol/L 6   CALCIUM mg/dL 8 0*   GLUCOSE RANDOM mg/dL 241*         Results from last 7 days   Lab Units 12/27/21  1030 12/27/21  0638 12/26/21  2117 12/26/21  1605 12/26/21  1104 12/26/21  0638 12/25/21  2113 12/25/21  1543 12/25/21  1036 12/25/21  0635 12/24/21  2051 12/24/21  1717   POC GLUCOSE mg/dl 220* 220* 188* 224* 318* 259* 359* 263* 424* 317* 242* 292*                Recent Cultures (last 7 days):           Last 24 Hours Medication List:   Current Facility-Administered Medications   Medication Dose Route Frequency Provider Last Rate    acetaminophen  650 mg Oral Q6H PRN Telma Clark MD      atorvastatin  10 mg Oral Daily With Sherren Seltzer, MD      budesonide-formoterol  2 puff Inhalation BID Telma Clark MD      furosemide  60 mg Intravenous BID (diuretic) Tommie Fierro MD      guaiFENesin  600 mg Oral BID Telma Clark MD      heparin (porcine)  7,500 Units Subcutaneous Q8H Piedad Francois MD      hydrALAZINE  5 mg Intravenous Q6H PRN Vilma Neff MD      insulin glargine  25 Units Subcutaneous HS Tommie Fierro MD      insulin lispro  10 Units Subcutaneous TID With Meals Tommie Fierro MD      insulin lispro  2-12 Units Subcutaneous 4x Daily (with meals and at bedtime) Jamil Tapia PA-C      ipratropium  0 5 mg Nebulization TID Telma Clark MD      levalbuterol  1 25 mg Nebulization TID Telma Clark MD      IVPB builder   Intravenous Q12H Tommie Fierro  mL/hr at 12/27/21 0532    montelukast  10 mg Oral HS Telma Clark MD      sodium chloride  1 spray Each Nare Q1H PRN Telma Clark MD          Today, Patient Was Seen By: Tommie Fierro MD    ** Please Note: Dictation voice to text software may have been used in the creation of this document   **

## 2021-12-27 NOTE — ASSESSMENT & PLAN NOTE
Wt Readings from Last 3 Encounters:   12/26/21 (!) 150 kg (331 lb 9 6 oz)   12/16/21 (!) 154 kg (339 lb)   11/22/21 (!) 158 kg (348 lb)     · Presented with TELLES/SOB, increasing LE edema, and orthopnea   Multiple recent admissions for CHF exacerbation triggered by asthma exacerbation  · Echo 11/12/21 showed ejection fraction 55% and grade 1 diastolic dysfunction  · Current home regimen is Lasix 40 mg b i d  which was recently increased by OP cardiology given repeated admissions for HF exacerbations in setting of asthma exacerbation  · Will need to reschedule outpatient stress test/ischemic workup upon discharge  · Intake and output, daily weights, fluid restriction  · Increase lasix IV 60 mg to BID due to no significant output   · Consult cardio

## 2021-12-27 NOTE — CASE MANAGEMENT
Case Management Progress Note    Patient name Yonas Rivero  Location 2 218/CW2 218-01 MRN [id number]  : 1965 Date 2021       LOS (days): 4  Geometric Mean LOS (GMLOS) (days):   Days to GMLOS:        OBJECTIVE:        Current admission status: Inpatient  Preferred Pharmacy:   [address], [place] - [address] [facility] [address]  Phone: [phone] Fax: [phone]    Primary Care Provider: Jann Jean-Baptiste MD    Primary Insurance: [employer]  Secondary Insurance:     PROGRESS NOTE:    Brief open completed via chart -- Pt last d/[facility]'ed [facility] in November following CHF exacerbation triggered by asthma  Pt was IPTA, ambulated w/ no assistant devices and required no CM interventions  No CM needs identified during the duration of this admission as well  CM will anticipate d/c to home in 48-72 hours w/ no need for CM interventions  CM will continue to follow and plan for d/c in accordance w/ pt's needs

## 2021-12-27 NOTE — CONSULTS
Consultation - Cardiology   Ben Carnes 49 y o  male MRN: [id number]  Unit/Bed#: CW2 218-01 Encounter: [id number]    Assessment/Plan     Principal Problem:    Acute on chronic diastolic heart failure [facility])  Active Problems:    Essential hypertension    Type 2 diabetes mellitus with hyperglycemia (HCC)    Morbid obesity (HCC)    VICKY (obstructive sleep apnea)    Moderate persistent asthma with acute exacerbation    Stage 3 chronic kidney disease (HCC)      Assessment/Plan    1  Acute on chronic diastolic heart failure  Patient still with wheezing, orthopnea, LE edema  Lasix increased back to 60mg IV BID today  In setting of hypoalbuminemia will start IV bumex  Follow strict I&O, daily weight and symptoms  Sodium restriction 2 gram  D/t other medical issues I agreed to 2500 cc FR  Daily weights  Will plan on torsemide on discharge  TTE 11/12/2021- LVEF 55 with grade 1 diastolic dysfunction  RV size and function normal   Hypokinesis basal inferior and basal inferolateral wall  Patient with RF for CAD  Consider proceeding with stress testing prior to discharge once able to lie flat  2  Moderate persistent asthma- IV steroids, respiratory protocol ( steroids likely contributing to edema)  Patient had been off and on steroids 6 months  Still with wheezing despite diuresis however still volume overloaded  Up till this summer patient has not had an asthma exacerbation in 30 years  3  VICKY on CPAP/morbid obesity s/p gastric bypass    4  Type 2  Diabetes-hemoglobin A1c 7%  Per Medicine    5  CKD 3-creatinine 1 4 which appears to be stable   Since September creatinine 1 3-1 5  Monitor with diuresis    6  HTN- normotensive  Currently on no anti hypertensives    7  HLD- atorvastatin 10 mg  LDL 73    8  WCT- appears to be 13 beat NSVT  Avoid BB at this time d/t active wheezing     Keep potassium greater than 4 and mag greater than 2  Replete potassium of 3 5  Eventual ischemia evaluation    History of Present Illness   Physician Requesting Consult: Cristo Viramontes MD  Reason for Consult / Principal Problem:  Heart failure    HPI: Lana Mcallister is a 49y o  year old male with chronic diastolic heart failure, moderate persistent asthma, VICKY on CPAP, morbid obesity, history of cigar use, HTN, type 2 diabetes, HLD, CKD III who presents 12/23 with worsening TELLES and edema  IV diuretics and IV steroids have been initated  He was started on Lasix 60mg IV BID with improved symptoms but mild creat bump and decreased to 60mg daily X1 day   Lower extremity edema and shortness of breath back up and increase to 60 mg IV Lasix b i d  Today  He is below his recent discharge weight of 339 lbs  Patient states that he has had symptoms since this past June with worsening shortness of breath, wheezing, lower extremity edema  He has been treated for asthma exacerbation which he believes has likely been from fluid retention instead  He said in November he was finally treated with additional IV diuretics with improvement  His  1st week he went home and he felt better than he has in a long time  Over the next 4 weeks he had increased edema, shortness of breath and wheezing  He does not have a scale thus has not been weighing himself  He has no chest pain or pressure  He has not been able to lay flat or in his bed since May this year due to shortness of breath  He said he made a great effort with salt restriction since November  He has been taking Lasix 30 mg twice a day which was recently increased to 40 mg twice a day with no improvement  He has been off and on steroids  HS CRP 20,19  COVID 19 negative   Potassium 3 5  Mag 2 4  BUN/creat 34/1 4  Albumin 2 9    He is followed by Dr Erasmo Gerber  Plans for pharmacological stress test for risk stratification due to multiple risk factors but was unable to lie flat  He saw RODRIGUE for hospital follow-up December 15, 2021 and noticed increased TELLES, orthopnea and wheezing    She increased his Lasix to 40 mg twice a day  PFTs-severe obstructive airflow defect on spirometry  Recent discharge weight 339 lb  TTE 11/12/21: LVEF 55%, LVIDd 6 cm,  grade I DD, RV normal, basal inferior and inferolateral walls hypokinetic, trace TR  TTE 7/20201: LVEF 50%, LVIDd 6 1 cm,  no RWMA, akinesis of basal inferior and basal mid inferolateral walls, trace MR, trace TR, IVC mildly dilated, RV normal  TTE 3/2020: LVEF 40-45%, LVIDd 6 12 cm,  RV normal, no MR/TR, no effusions,         Inpatient consult to Cardiology  Consult performed by: RODRIGUE Gonsales  Consult ordered by: Fei Castillo MD          Review of Systems   Constitutional: Positive for activity change  HENT: Negative  Eyes: Negative  Respiratory: Positive for cough and shortness of breath  Cardiovascular: Positive for leg swelling  Negative for chest pain and palpitations  Gastrointestinal: Negative  Endocrine: Negative  Genitourinary: Negative  Musculoskeletal: Negative  Allergic/Immunologic: Negative  Neurological: Negative  Hematological: Negative  Psychiatric/Behavioral: Negative  All other systems reviewed and are negative        Historical Information   Past Medical History:   Diagnosis Date    Acute gastritis without hemorrhage     last assessed 3/24/17    Asthma     Diabetes mellitus ([facility] [address] )     Gastric bypass status for obesity     Hyperlipidemia     Hypertension     Impaired fasting glucose     last assessed 5/10/17    Obesity     Viral gastroenteritis     last assessed 11/4/16     Past Surgical History:   Procedure Laterality Date    CARPAL TUNNEL RELEASE      bilateral    GASTRIC BYPASS  2004    with han en y   [id number]      ventral inscisional    TONSILLECTOMY       Social History     Substance and Sexual Activity   Alcohol Use Yes    Alcohol/week: 1 0 standard drink    Types: 1 Standard drinks or equivalent per week    Comment: social     Social History     Substance and Sexual Activity   Drug Use No     Social History     Tobacco Use   Smoking Status Light Tobacco Smoker    Types: Cigars   Smokeless Tobacco Current User   Tobacco Comment    rare/hasn't smoked in 1 year     Family History:   Family History   Problem Relation Age of Onset    Stroke Mother     Hypertension Father        Meds/Allergies   current meds:   Current Facility-Administered Medications   Medication Dose Route Frequency    acetaminophen (TYLENOL) tablet 650 mg  650 mg Oral Q6H PRN    atorvastatin (LIPITOR) tablet 10 mg  10 mg Oral Daily With Dinner    budesonide-formoterol (SYMBICORT) 160-4 5 mcg/act inhaler 2 puff  2 puff Inhalation BID    furosemide (LASIX) injection 60 mg  60 mg Intravenous BID (diuretic)    guaiFENesin (MUCINEX) 12 hr tablet 600 mg  600 mg Oral BID    heparin (porcine) subcutaneous injection 7,500 Units  7,500 Units Subcutaneous Q8H [facility]    hydrALAZINE (APRESOLINE) injection 5 mg  5 mg Intravenous Q6H PRN    insulin glargine (LANTUS) subcutaneous injection 25 Units 0 25 mL  25 Units Subcutaneous HS    insulin lispro (HumaLOG) 100 units/mL subcutaneous injection 10 Units  10 Units Subcutaneous TID With Meals    insulin lispro (HumaLOG) 100 units/mL subcutaneous injection 2-12 Units  2-12 Units Subcutaneous 4x Daily (with meals and at bedtime)    ipratropium (ATROVENT) 0 02 % inhalation solution 0 5 mg  0 5 mg Nebulization TID    levalbuterol (XOPENEX) inhalation solution 1 25 mg  1 25 mg Nebulization TID    methylPREDNISolone sodium succinate (Solu-MEDROL) 40 mg in sodium chloride 0 9 % 100 mL IVPB   Intravenous Q12H    montelukast (SINGULAIR) tablet 10 mg  10 mg Oral HS    sodium chloride (OCEAN) 0 65 % nasal spray 1 spray  1 spray Each Nare Q1H PRN    and PTA meds:    Medications Prior to Admission   Medication    albuterol (PROVENTIL HFA,VENTOLIN HFA) 90 mcg/act inhaler    atorvastatin (LIPITOR) 10 mg tablet    budesonide-formoterol (Symbicort) 160-4 5 mcg/act inhaler    guaiFENesin (MUCINEX) 600 mg 12 hr tablet    Januvia 100 MG tablet    levalbuterol (Xopenex) 1 25 mg/3 mL nebulizer solution    metFORMIN (GLUCOPHAGE) 500 mg tablet    MULTIPLE VITAMINS-CALCIUM PO    potassium chloride (K-DUR,KLOR-CON) 10 mEq tablet    sodium chloride (OCEAN) 0 65 % nasal spray    tiotropium (Spiriva Respimat) 1 25 MCG/ACT AERS inhaler    torsemide (DEMADEX) 20 mg tablet    Accu-Chek FastClix Lancets MISC    Blood Glucose Monitoring Suppl (Accu-Chek Guide) w/Device KIT    cephalexin (KEFLEX) 500 mg capsule    glucose blood (Accu-Chek Guide) test strip    montelukast (SINGULAIR) 10 mg tablet     Allergies   Allergen Reactions    Azithromycin Other (See Comments)     Shaky, uneasy feeling     Bactrim [Sulfamethoxazole-Trimethoprim] Other (See Comments)     shakiness       Objective   Vitals: Blood pressure 135/75, pulse 83, temperature (!) 97 4 °F (36 3 °C), resp  rate 18, height 6' 0 99" (1 854 m), weight (!) 150 kg (331 lb 9 6 oz), SpO2 95 %    Orthostatic Blood Pressures      Most Recent Value   Blood Pressure 135/75 filed at 12/27/2021 1151   Patient Position - Orthostatic VS Lying filed at 12/27/2021 0025            Intake/Output Summary (Last 24 hours) at 12/27/2021 1326  Last data filed at 12/27/2021 1001  Gross per 24 hour   Intake 760 ml   Output 2875 ml   Net -2115 ml       Invasive Devices  Report    Peripheral Intravenous Line            Peripheral IV 12/26/21 Left;Upper;Ventral (anterior) Arm 1 day                Physical Exam: /75   Pulse 83   Temp (!) 97 4 °F (36 3 °C)   Resp 18   Ht 6' 0 99" (1 854 m) Comment: (11/22/2021) Per Chart Review  Wt (!) 150 kg (331 lb 9 6 oz)   SpO2 95%   BMI 43 76 kg/m²   General Appearance:    Alert, cooperative, no distress, appears stated age   Head:    Normocephalic, no scleral icterus   Eyes:    PERRL   Nose:   Nares normal, septum midline, mucosa normal, no drainage    Throat:   Lips, mucosa, and tongue normal   Neck: Supple, symmetrical, trachea midline           Lungs:     Clear to auscultation bilaterally, respirations unlabored        Heart:    Regular rate and rhythm, S1 and S2 normal, no murmur, rub   or gallop   Abdomen:     Soft, non-tender   Extremities:   Extremities normal, atraumatic, no cyanosis ,moderate nonpitting edema   Pulses:   2+ and symmetric all extremities   Skin:   Skin warm   Neurologic:   Alert and oriented to person place and time   No focal deficits       Lab Results:   Recent Results (from the past 72 hour(s))   Fingerstick Glucose (POCT)    Collection Time: 12/24/21  5:17 PM   Result Value Ref Range    POC Glucose 292 (H) 65 - 140 mg/dl   Fingerstick Glucose (POCT)    Collection Time: 12/24/21  8:51 PM   Result Value Ref Range    POC Glucose 242 (H) 65 - 140 mg/dl   CBC and differential    Collection Time: 12/25/21  4:38 AM   Result Value Ref Range    WBC 10 91 (H) 4 31 - 10 16 Thousand/uL    RBC 3 95 3 88 - 5 62 Million/uL    Hemoglobin 11 9 (L) 12 0 - 17 0 g/dL    Hematocrit 37 1 36 5 - 49 3 %    MCV 94 82 - 98 fL    MCH 30 1 26 8 - 34 3 pg    MCHC 32 1 31 4 - 37 4 g/dL    RDW 13 3 11 6 - 15 1 %    MPV 10 4 8 9 - 12 7 fL    Platelets 289 776 - 931 Thousands/uL    nRBC 0 /100 WBCs    Neutrophils Relative 89 (H) 43 - 75 %    Immat GRANS % 1 0 - 2 %    Lymphocytes Relative 6 (L) 14 - 44 %    Monocytes Relative 4 4 - 12 %    Eosinophils Relative 0 0 - 6 %    Basophils Relative 0 0 - 1 %    Neutrophils Absolute 9 60 (H) 1 85 - 7 62 Thousands/µL    Immature Grans Absolute 0 15 0 00 - 0 20 Thousand/uL    Lymphocytes Absolute 0 69 0 60 - 4 47 Thousands/µL    Monocytes Absolute 0 44 0 17 - 1 22 Thousand/µL    Eosinophils Absolute 0 00 0 00 - 0 61 Thousand/µL    Basophils Absolute 0 03 0 00 - 0 10 Thousands/µL   Basic metabolic panel    Collection Time: 12/25/21  4:38 AM   Result Value Ref Range    Sodium 135 (L) 136 - 145 mmol/L    Potassium 3 9 3 5 - 5 3 mmol/L    Chloride 97 (L) 100 - 108 mmol/L    CO2 32 21 - 32 mmol/L    ANION GAP 6 4 - 13 mmol/L    BUN 33 (H) 5 - 25 mg/dL    Creatinine 1 59 (H) 0 60 - 1 30 mg/dL    Glucose 296 (H) 65 - 140 mg/dL    Calcium 8 0 (L) 8 3 - 10 1 mg/dL    eGFR 48 ml/min/1 73sq m   Fingerstick Glucose (POCT)    Collection Time: 12/25/21  6:35 AM   Result Value Ref Range    POC Glucose 317 (H) 65 - 140 mg/dl   Fingerstick Glucose (POCT)    Collection Time: 12/25/21 10:36 AM   Result Value Ref Range    POC Glucose 424 (H) 65 - 140 mg/dl   Fingerstick Glucose (POCT)    Collection Time: 12/25/21  3:43 PM   Result Value Ref Range    POC Glucose 263 (H) 65 - 140 mg/dl   Fingerstick Glucose (POCT)    Collection Time: 12/25/21  9:13 PM   Result Value Ref Range    POC Glucose 359 (H) 65 - 140 mg/dl   Fingerstick Glucose (POCT)    Collection Time: 12/26/21  6:38 AM   Result Value Ref Range    POC Glucose 259 (H) 65 - 140 mg/dl   Basic metabolic panel    Collection Time: 12/26/21  6:39 AM   Result Value Ref Range    Sodium 137 136 - 145 mmol/L    Potassium 3 5 3 5 - 5 3 mmol/L    Chloride 97 (L) 100 - 108 mmol/L    CO2 32 21 - 32 mmol/L    ANION GAP 8 4 - 13 mmol/L    BUN 36 (H) 5 - 25 mg/dL    Creatinine 1 71 (H) 0 60 - 1 30 mg/dL    Glucose 246 (H) 65 - 140 mg/dL    Calcium 8 2 (L) 8 3 - 10 1 mg/dL    eGFR 44 ml/min/1 73sq m   Magnesium    Collection Time: 12/26/21  6:39 AM   Result Value Ref Range    Magnesium 2 4 1 6 - 2 6 mg/dL   Fingerstick Glucose (POCT)    Collection Time: 12/26/21 11:04 AM   Result Value Ref Range    POC Glucose 318 (H) 65 - 140 mg/dl   Fingerstick Glucose (POCT)    Collection Time: 12/26/21  4:05 PM   Result Value Ref Range    POC Glucose 224 (H) 65 - 140 mg/dl   Fingerstick Glucose (POCT)    Collection Time: 12/26/21  9:17 PM   Result Value Ref Range    POC Glucose 188 (H) 65 - 140 mg/dl   Basic metabolic panel    Collection Time: 12/27/21  5:45 AM   Result Value Ref Range    Sodium 137 136 - 145 mmol/L    Potassium 3 5 3 5 - 5 3 mmol/L    Chloride 100 100 - 108 mmol/L CO2 31 21 - 32 mmol/L    ANION GAP 6 4 - 13 mmol/L    BUN 34 (H) 5 - 25 mg/dL    Creatinine 1 48 (H) 0 60 - 1 30 mg/dL    Glucose 241 (H) 65 - 140 mg/dL    Calcium 8 0 (L) 8 3 - 10 1 mg/dL    eGFR 52 ml/min/1 73sq m   CBC    Collection Time: 12/27/21  5:45 AM   Result Value Ref Range    WBC 9 84 4 31 - 10 16 Thousand/uL    RBC 4 16 3 88 - 5 62 Million/uL    Hemoglobin 12 6 12 0 - 17 0 g/dL    Hematocrit 39 0 36 5 - 49 3 %    MCV 94 82 - 98 fL    MCH 30 3 26 8 - 34 3 pg    MCHC 32 3 31 4 - 37 4 g/dL    RDW 13 5 11 6 - 15 1 %    Platelets 967 019 - 424 Thousands/uL    MPV 11 0 8 9 - 12 7 fL   Fingerstick Glucose (POCT)    Collection Time: 12/27/21  6:38 AM   Result Value Ref Range    POC Glucose 220 (H) 65 - 140 mg/dl   Fingerstick Glucose (POCT)    Collection Time: 12/27/21 10:30 AM   Result Value Ref Range    POC Glucose 220 (H) 65 - 140 mg/dl     Imaging: I have personally reviewed pertinent reports  EKG: sinus tachcyardia      Code Status: Level 1 - Full Code  Advance Directive and Living Will:      Power of :    POLST:      Counseling / Coordination of Care  Total floor / unit time spent today 45 minutes  Greater than 50% of total time was spent with the patient and / or family counseling and / or coordination of care

## 2021-12-28 LAB
ANION GAP SERPL CALCULATED.3IONS-SCNC: 6 MMOL/L (ref 4–13)
BUN SERPL-MCNC: 38 MG/DL (ref 5–25)
CALCIUM SERPL-MCNC: 8.4 MG/DL (ref 8.3–10.1)
CHLORIDE SERPL-SCNC: 100 MMOL/L (ref 100–108)
CO2 SERPL-SCNC: 30 MMOL/L (ref 21–32)
CREAT SERPL-MCNC: 1.43 MG/DL (ref 0.6–1.3)
GFR SERPL CREATININE-BSD FRML MDRD: 55 ML/MIN/1.73SQ M
GLUCOSE SERPL-MCNC: 146 MG/DL (ref 65–140)
GLUCOSE SERPL-MCNC: 162 MG/DL (ref 65–140)
GLUCOSE SERPL-MCNC: 189 MG/DL (ref 65–140)
GLUCOSE SERPL-MCNC: 189 MG/DL (ref 65–140)
GLUCOSE SERPL-MCNC: 254 MG/DL (ref 65–140)
POTASSIUM SERPL-SCNC: 3.6 MMOL/L (ref 3.5–5.3)
SODIUM SERPL-SCNC: 136 MMOL/L (ref 136–145)

## 2021-12-28 PROCEDURE — 99232 SBSQ HOSP IP/OBS MODERATE 35: CPT | Performed by: PHYSICIAN ASSISTANT

## 2021-12-28 PROCEDURE — 94640 AIRWAY INHALATION TREATMENT: CPT

## 2021-12-28 PROCEDURE — 80048 BASIC METABOLIC PNL TOTAL CA: CPT | Performed by: FAMILY MEDICINE

## 2021-12-28 PROCEDURE — 82948 REAGENT STRIP/BLOOD GLUCOSE: CPT

## 2021-12-28 PROCEDURE — 99232 SBSQ HOSP IP/OBS MODERATE 35: CPT | Performed by: NURSE PRACTITIONER

## 2021-12-28 PROCEDURE — 94760 N-INVAS EAR/PLS OXIMETRY 1: CPT

## 2021-12-28 RX ORDER — POTASSIUM CHLORIDE 20 MEQ/1
40 TABLET, EXTENDED RELEASE ORAL 2 TIMES DAILY
Status: DISCONTINUED | OUTPATIENT
Start: 2021-12-28 | End: 2022-01-01 | Stop reason: HOSPADM

## 2021-12-28 RX ORDER — POTASSIUM CHLORIDE 20 MEQ/1
20 TABLET, EXTENDED RELEASE ORAL ONCE
Status: COMPLETED | OUTPATIENT
Start: 2021-12-28 | End: 2021-12-28

## 2021-12-28 RX ADMIN — HEPARIN SODIUM 7500 UNITS: 5000 INJECTION INTRAVENOUS; SUBCUTANEOUS at 22:32

## 2021-12-28 RX ADMIN — SODIUM CHLORIDE: 9 INJECTION, SOLUTION INTRAVENOUS at 06:27

## 2021-12-28 RX ADMIN — POTASSIUM CHLORIDE 40 MEQ: 1500 TABLET, EXTENDED RELEASE ORAL at 18:07

## 2021-12-28 RX ADMIN — LEVALBUTEROL HYDROCHLORIDE 1.25 MG: 1.25 SOLUTION, CONCENTRATE RESPIRATORY (INHALATION) at 13:19

## 2021-12-28 RX ADMIN — IPRATROPIUM BROMIDE 0.5 MG: 0.5 SOLUTION RESPIRATORY (INHALATION) at 08:00

## 2021-12-28 RX ADMIN — IPRATROPIUM BROMIDE 0.5 MG: 0.5 SOLUTION RESPIRATORY (INHALATION) at 19:11

## 2021-12-28 RX ADMIN — ATORVASTATIN CALCIUM 10 MG: 10 TABLET, FILM COATED ORAL at 17:19

## 2021-12-28 RX ADMIN — GUAIFENESIN 600 MG: 600 TABLET, EXTENDED RELEASE ORAL at 09:14

## 2021-12-28 RX ADMIN — GUAIFENESIN 600 MG: 600 TABLET, EXTENDED RELEASE ORAL at 17:19

## 2021-12-28 RX ADMIN — BUDESONIDE AND FORMOTEROL FUMARATE DIHYDRATE 2 PUFF: 160; 4.5 AEROSOL RESPIRATORY (INHALATION) at 17:18

## 2021-12-28 RX ADMIN — LEVALBUTEROL HYDROCHLORIDE 1.25 MG: 1.25 SOLUTION, CONCENTRATE RESPIRATORY (INHALATION) at 19:11

## 2021-12-28 RX ADMIN — MONTELUKAST 10 MG: 10 TABLET, FILM COATED ORAL at 22:32

## 2021-12-28 RX ADMIN — BUDESONIDE AND FORMOTEROL FUMARATE DIHYDRATE 2 PUFF: 160; 4.5 AEROSOL RESPIRATORY (INHALATION) at 09:14

## 2021-12-28 RX ADMIN — HEPARIN SODIUM 7500 UNITS: 5000 INJECTION INTRAVENOUS; SUBCUTANEOUS at 14:37

## 2021-12-28 RX ADMIN — POTASSIUM CHLORIDE 20 MEQ: 1500 TABLET, EXTENDED RELEASE ORAL at 09:14

## 2021-12-28 RX ADMIN — BUMETANIDE 2 MG: 0.25 INJECTION INTRAMUSCULAR; INTRAVENOUS at 17:18

## 2021-12-28 RX ADMIN — IPRATROPIUM BROMIDE 0.5 MG: 0.5 SOLUTION RESPIRATORY (INHALATION) at 13:20

## 2021-12-28 RX ADMIN — INSULIN GLARGINE 25 UNITS: 100 INJECTION, SOLUTION SUBCUTANEOUS at 22:32

## 2021-12-28 RX ADMIN — BUMETANIDE 2 MG: 0.25 INJECTION INTRAMUSCULAR; INTRAVENOUS at 09:14

## 2021-12-28 RX ADMIN — HEPARIN SODIUM 7500 UNITS: 5000 INJECTION INTRAVENOUS; SUBCUTANEOUS at 06:13

## 2021-12-28 RX ADMIN — LEVALBUTEROL HYDROCHLORIDE 1.25 MG: 1.25 SOLUTION, CONCENTRATE RESPIRATORY (INHALATION) at 08:00

## 2021-12-28 NOTE — ASSESSMENT & PLAN NOTE
Lab Results   Component Value Date    EGFR 55 12/28/2021    EGFR 52 12/27/2021    EGFR 44 12/26/2021    CREATININE 1 43 (H) 12/28/2021    CREATININE 1 48 (H) 12/27/2021    CREATININE 1 71 (H) 12/26/2021   · Baseline creatinine 1 1-1 5  · Creatinine today 1 52  · Monitor kidney function while receiving diuretics  · Avoid nephrotoxins and hypotension

## 2021-12-28 NOTE — ASSESSMENT & PLAN NOTE
Lab Results   Component Value Date    HGBA1C 7 9 (H) 01/28/2020     Goal for Hb A1C < 7 0  Continue Januvia 100 mg 1 tablet daily, Metformin 500 mg 1 tablet twice daily with meals  Follow a low carb diet, avoid concentrated sweets  English

## 2021-12-28 NOTE — ASSESSMENT & PLAN NOTE
Wt Readings from Last 3 Encounters:   12/28/21 (!) 150 kg (330 lb 9 6 oz)   12/16/21 (!) 154 kg (339 lb)   11/22/21 (!) 158 kg (348 lb)     · Presented with TELLES/SOB, increasing LE edema, and orthopnea   Multiple recent admissions for CHF exacerbation triggered by asthma exacerbation  · Echo 11/12/21 showed ejection fraction 55% and grade 1 diastolic dysfunction  · Current home regimen is Lasix 40 mg b i d  which was recently increased by OP cardiology given repeated admissions for HF exacerbations in setting of asthma exacerbation  · Will need to reschedule outpatient stress test/ischemic workup upon discharge  · Intake and output, daily weights, fluid restriction  · Cardiology consulted, transitioned to IV Bumex with improved diuresis

## 2021-12-28 NOTE — ASSESSMENT & PLAN NOTE
· Most recent exacerbation in November, was discharged on a steroid taper  · On admission, c/o SOB with wheezing not improved with nebs  · Started on Solu-Medrol 40 mg q 8 hours and scheduled xopenex/atrovent with respiratory protocol   · Continue symbicort  · Breathing improved but still with dyspnea and persistent wheezing,   · Decreased to Solu-Medrol once daily

## 2021-12-28 NOTE — ASSESSMENT & PLAN NOTE
Lab Results   Component Value Date    HGBA1C 7 0 (H) 11/10/2021       Recent Labs     12/27/21  1600 12/27/21 2053 12/28/21  0607 12/28/21  1057   POCGLU 237* 186* 189* 254*       Blood Sugar Average: Last 72 hrs:  · 12/25- Increase lantus from to 25 units, and humalog 10 units TID with meals  · Avoid hypoglycemia-protocol in place  · Trend BGs, increase insulin as necessary

## 2021-12-28 NOTE — PROGRESS NOTES
Progress Note - Cardiology   Virl Blood 49 y o  male MRN: [id number]  Unit/Bed#: CW2 218-01 Encounter: [id number]        Principal Problem:    Acute on chronic diastolic heart failure ([facility] [address] )  Active Problems:    Essential hypertension    Type 2 diabetes mellitus with hyperglycemia (HCC)    Morbid obesity (HCC)    VICKY (obstructive sleep apnea)    Moderate persistent asthma with acute exacerbation    Stage 3 chronic kidney disease (HCC)        Assessment/Plan     1  Acute on chronic diastolic heart failure  Patient still with wheezing, orthopnea, LE edema  Lasix increased back to 60mg IV BID 12/28   In setting of hypoalbuminemia started V bumex- 2mg BID ( has had 1 dose thus far)  Follow strict I&O, daily weight and symptoms  Sodium restriction 2 gram  D/t other medical issues I agreed to [facility] FR  Daily weights       TTE 11/12/2021- LVEF 55 with grade 1 diastolic dysfunction  RV size and function normal   Hypokinesis basal inferior and basal inferolateral wall      Patient with RF for CAD  Consider proceeding with stress testing prior to discharge once able to lie flat       2  Moderate persistent asthma- IV steroids, respiratory protocol ( steroids likely contributing to edema)  Patient had been off and on steroids 6 months  Still with wheezing despite diuresis however still volume overloaded  Up till this summer patient has not had an asthma exacerbation in 30 years        3  VICKY on CPAP/morbid obesity s/p gastric bypass     4  Type 2  Diabetes-hemoglobin A1c 7%  Per Medicine     5  CKD 3-creatinine 1 4 which appears to be stable   Since September creatinine 1 3-1 5  Monitor with diuresis     6  HTN- normotensive  Currently on no anti hypertensives     7  HLD- atorvastatin 10 mg  LDL 73     8  WCT- 13 beat NSVT 12/27  Avoid BB at this time d/t active wheezing     Keep potassium greater than 4 and mag greater than 2  Replete potassium of 3 6  Eventual ischemia evaluation    Subjective/Objective   Chief Complaint/Subjective  Patient with no significant change in breathing  No chest pain or pressure  Diuresed a bit more last evening  Still with complaints of shortness of breath edema and orthopnea  Vitals: /72   Pulse 83   Temp (!) 97 2 °F (36 2 °C)   Resp 17   Ht 6' 0 99" (1 854 m) Comment: (11/22/2021) Per Chart Review  Wt (!) 150 kg (330 lb 9 6 oz)   SpO2 95%   BMI 43 63 kg/m²     Vitals:    12/26/21 0600 12/28/21 0600   Weight: (!) 150 kg (331 lb 9 6 oz) (!) 150 kg (330 lb 9 6 oz)     Orthostatic Blood Pressures      Most Recent Value   Blood Pressure 139/72 filed at 12/28/2021 0844   Patient Position - Orthostatic VS Lying filed at 12/27/2021 0025            Intake/Output Summary (Last 24 hours) at 12/28/2021 0910  Last data filed at 12/28/2021 5802  Gross per 24 hour   Intake 610 ml   Output 2725 ml   Net -2115 ml       Invasive Devices  Report    Peripheral Intravenous Line            Peripheral IV 12/27/21 Dorsal (posterior); Right Forearm <1 day                Current Facility-Administered Medications   Medication Dose Route Frequency    acetaminophen (TYLENOL) tablet 650 mg  650 mg Oral Q6H PRN    atorvastatin (LIPITOR) tablet 10 mg  10 mg Oral Daily With Dinner    budesonide-formoterol (SYMBICORT) 160-4 5 mcg/act inhaler 2 puff  2 puff Inhalation BID    bumetanide (BUMEX) injection 2 mg  2 mg Intravenous BID    guaiFENesin (MUCINEX) 12 hr tablet 600 mg  600 mg Oral BID    heparin (porcine) subcutaneous injection 7,500 Units  7,500 Units Subcutaneous Q8H [address]    hydrALAZINE (APRESOLINE) injection 5 mg  5 mg Intravenous Q6H PRN    insulin glargine (LANTUS) subcutaneous injection 25 Units 0 25 mL  25 Units Subcutaneous HS    insulin lispro (HumaLOG) 100 units/mL subcutaneous injection 10 Units  10 Units Subcutaneous TID With Meals    insulin lispro (HumaLOG) 100 units/mL subcutaneous injection 2-12 Units  2-12 Units Subcutaneous 4x Daily (with meals and at bedtime)    ipratropium (ATROVENT) 0 02 % inhalation solution 0 5 mg  0 5 mg Nebulization TID    levalbuterol (XOPENEX) inhalation solution 1 25 mg  1 25 mg Nebulization TID    methylPREDNISolone sodium succinate (Solu-MEDROL) 40 mg in sodium chloride 0 9 % 100 mL IVPB   Intravenous Q12H    montelukast (SINGULAIR) tablet 10 mg  10 mg Oral HS    potassium chloride (K-DUR,KLOR-CON) CR tablet 20 mEq  20 mEq Oral BID    sodium chloride (OCEAN) 0 65 % nasal spray 1 spray  1 spray Each Nare Q1H PRN         Physical Exam: /72   Pulse 83   Temp (!) 97 2 °F (36 2 °C)   Resp 17   Ht 6' 0 99" (1 854 m) Comment: (11/22/2021) Per Chart Review  Wt (!) 150 kg (330 lb 9 6 oz)   SpO2 95%   BMI 43 63 kg/m²     General Appearance:    Alert, cooperative, no distress, appears stated age   Head:    Normocephalic, no scleral icterus   Eyes:    PERRL   Nose:   Nares normal, septum midline, no drainage    Throat:   Lips, mucosa, and tongue normal   Neck:   Supple, symmetrical, trachea midline,             Lungs:     Prolonged exp phase to auscultation bilaterally, respirations unlabored        Heart:    Regular rate and rhythm, S1 and S2 normal, no murmur, rub   or gallop   Abdomen:     Soft, non-tender, bowel sounds active all four quadrants,     no masses, no organomegaly   Extremities:   Extremities normal, atraumatic, no cyanosis , mild non pitting edema       Skin:   Skin warm   Neurologic:   Alert and oriented to person place and time, no focal deficits                 Lab Results:   Recent Results (from the past 72 hour(s))   Fingerstick Glucose (POCT)    Collection Time: 12/25/21 10:36 AM   Result Value Ref Range    POC Glucose 424 (H) 65 - 140 mg/dl   Fingerstick Glucose (POCT)    Collection Time: 12/25/21  3:43 PM   Result Value Ref Range    POC Glucose 263 (H) 65 - 140 mg/dl   Fingerstick Glucose (POCT)    Collection Time: 12/25/21  9:13 PM   Result Value Ref Range    POC Glucose 359 (H) 65 - 140 mg/dl   Fingerstick Glucose (POCT) Collection Time: 12/26/21  6:38 AM   Result Value Ref Range    POC Glucose 259 (H) 65 - 140 mg/dl   Basic metabolic panel    Collection Time: 12/26/21  6:39 AM   Result Value Ref Range    Sodium 137 136 - 145 mmol/L    Potassium 3 5 3 5 - 5 3 mmol/L    Chloride 97 (L) 100 - 108 mmol/L    CO2 32 21 - 32 mmol/L    ANION GAP 8 4 - 13 mmol/L    BUN 36 (H) 5 - 25 mg/dL    Creatinine 1 71 (H) 0 60 - 1 30 mg/dL    Glucose 246 (H) 65 - 140 mg/dL    Calcium 8 2 (L) 8 3 - 10 1 mg/dL    eGFR 44 ml/min/1 73sq m   Magnesium    Collection Time: 12/26/21  6:39 AM   Result Value Ref Range    Magnesium 2 4 1 6 - 2 6 mg/dL   Fingerstick Glucose (POCT)    Collection Time: 12/26/21 11:04 AM   Result Value Ref Range    POC Glucose 318 (H) 65 - 140 mg/dl   Fingerstick Glucose (POCT)    Collection Time: 12/26/21  4:05 PM   Result Value Ref Range    POC Glucose 224 (H) 65 - 140 mg/dl   Fingerstick Glucose (POCT)    Collection Time: 12/26/21  9:17 PM   Result Value Ref Range    POC Glucose 188 (H) 65 - 140 mg/dl   Basic metabolic panel    Collection Time: 12/27/21  5:45 AM   Result Value Ref Range    Sodium 137 136 - 145 mmol/L    Potassium 3 5 3 5 - 5 3 mmol/L    Chloride 100 100 - 108 mmol/L    CO2 31 21 - 32 mmol/L    ANION GAP 6 4 - 13 mmol/L    BUN 34 (H) 5 - 25 mg/dL    Creatinine 1 48 (H) 0 60 - 1 30 mg/dL    Glucose 241 (H) 65 - 140 mg/dL    Calcium 8 0 (L) 8 3 - 10 1 mg/dL    eGFR 52 ml/min/1 73sq m   CBC    Collection Time: 12/27/21  5:45 AM   Result Value Ref Range    WBC 9 84 4 31 - 10 16 Thousand/uL    RBC 4 16 3 88 - 5 62 Million/uL    Hemoglobin 12 6 12 0 - 17 0 g/dL    Hematocrit 39 0 36 5 - 49 3 %    MCV 94 82 - 98 fL    MCH 30 3 26 8 - 34 3 pg    MCHC 32 3 31 4 - 37 4 g/dL    RDW 13 5 11 6 - 15 1 %    Platelets 857 848 - 614 Thousands/uL    MPV 11 0 8 9 - 12 7 fL   Fingerstick Glucose (POCT)    Collection Time: 12/27/21  6:38 AM   Result Value Ref Range    POC Glucose 220 (H) 65 - 140 mg/dl   Fingerstick Glucose (POCT) Collection Time: 12/27/21 10:30 AM   Result Value Ref Range    POC Glucose 220 (H) 65 - 140 mg/dl   Fingerstick Glucose (POCT)    Collection Time: 12/27/21  4:00 PM   Result Value Ref Range    POC Glucose 237 (H) 65 - 140 mg/dl   Fingerstick Glucose (POCT)    Collection Time: 12/27/21  8:53 PM   Result Value Ref Range    POC Glucose 186 (H) 65 - 140 mg/dl   Fingerstick Glucose (POCT)    Collection Time: 12/28/21  6:07 AM   Result Value Ref Range    POC Glucose 189 (H) 65 - 140 mg/dl   Basic metabolic panel    Collection Time: 12/28/21  6:08 AM   Result Value Ref Range    Sodium 136 136 - 145 mmol/L    Potassium 3 6 3 5 - 5 3 mmol/L    Chloride 100 100 - 108 mmol/L    CO2 30 21 - 32 mmol/L    ANION GAP 6 4 - 13 mmol/L    BUN 38 (H) 5 - 25 mg/dL    Creatinine 1 43 (H) 0 60 - 1 30 mg/dL    Glucose 189 (H) 65 - 140 mg/dL    Calcium 8 4 8 3 - 10 1 mg/dL    eGFR 55 ml/min/1 73sq m     Imaging: I have personally reviewed pertinent reports  Tele- nsr    Counseling / Coordination of Care  Total time spent today 20 minutes  Greater than 50% of total time was spent with the patient and / or family counseling and / or coordination of care

## 2021-12-28 NOTE — PROGRESS NOTES
[facility]  Progress Note - Darrlel Drake 1965, 49 y o  male MRN: [id number]  Unit/Bed#: CW2 218-01 Encounter: [id number]  Primary Care Provider: Melissa Lemos MD   Date and time admitted to hospital: 12/23/2021  6:07 AM    * Acute on chronic diastolic heart failure [facility])  Assessment & Plan  Wt Readings from Last 3 Encounters:   12/28/21 (!) 150 kg (330 lb 9 6 oz)   12/16/21 (!) 154 kg (339 lb)   11/22/21 (!) 158 kg (348 lb)     · Presented with TELLES/SOB, increasing LE edema, and orthopnea   Multiple recent admissions for CHF exacerbation triggered by asthma exacerbation  · Echo 11/12/21 showed ejection fraction 55% and grade 1 diastolic dysfunction  · Current home regimen is Lasix 40 mg b i d  which was recently increased by OP cardiology given repeated admissions for HF exacerbations in setting of asthma exacerbation  · Will need to reschedule outpatient stress test/ischemic workup upon discharge  · Intake and output, daily weights, fluid restriction  · Cardiology consulted, transitioned to IV Bumex with improved diuresis    Stage 3 chronic kidney disease [facility])  Assessment & Plan  Lab Results   Component Value Date    EGFR 55 12/28/2021    EGFR 52 12/27/2021    EGFR 44 12/26/2021    CREATININE 1 43 (H) 12/28/2021    CREATININE 1 48 (H) 12/27/2021    CREATININE 1 71 (H) 12/26/2021   · Baseline creatinine 1 1-1 5  · Creatinine today 1 52  · Monitor kidney function while receiving diuretics  · Avoid nephrotoxins and hypotension    Moderate persistent asthma with acute exacerbation  Assessment & Plan  · Most recent exacerbation in November, was discharged on a steroid taper  · On admission, c/o SOB with wheezing not improved with nebs  · Started on Solu-Medrol 40 mg q 8 hours and scheduled xopenex/atrovent with respiratory protocol   · Continue symbicort  · Breathing improved but still with dyspnea and persistent wheezing,   · Decreased to Solu-Medrol once daily    VICKY (obstructive sleep apnea)  Assessment & Plan  · Patient cannot tolerate hospital CPAP  · Continue home CPAP as outpatient     Morbid obesity ([facility] [address] )  Assessment & Plan  · Dietary, lifestyle modifications encouraged    Type 2 diabetes mellitus with hyperglycemia [facility])  Assessment & Plan  Lab Results   Component Value Date    HGBA1C 7 0 (H) 11/10/2021       Recent Labs     21  1600 21  2053 21  0607 21  1057   POCGLU 237* 186* 189* 254*       Blood Sugar Average: Last 72 hrs:  · - Increase lantus from to 25 units, and humalog 10 units TID with meals  · Avoid hypoglycemia-protocol in place  · Trend BGs, increase insulin as necessary    Essential hypertension  Assessment & Plan  · Above goal on admission  · PRN hydralazine 5 mg q6h prn for SBP>130  · Trend BPs while receiving IV diuresis          VTE Pharmacologic Prophylaxis: VTE Score: 4 Moderate Risk (Score 3-4) - Pharmacological DVT Prophylaxis Ordered: heparin  Patient Centered Rounds: I performed bedside rounds with nursing staff today  Discussions with Specialists or Other Care Team Provider: RN    Education and Discussions with Family / Patient: Patient declined call to   Time Spent for Care: 30 minutes  More than 50% of total time spent on counseling and coordination of care as described above  Current Length of Stay: 5 day(s)  Current Patient Status: Inpatient   Certification Statement: The patient will continue to require additional inpatient hospital stay due to IV diuresis and steroids  Discharge Plan: Anticipate discharge in 48 hrs to home      Code Status: Level 1 - Full Code    Subjective:   Pt has no acute complaints, overall feeling better and wheezing is less     Objective:     Vitals:   Temp (24hrs), Av 3 °F (36 3 °C), Min:97 2 °F (36 2 °C), Max:97 5 °F (36 4 °C)    Temp:  [97 2 °F (36 2 °C)-97 5 °F (36 4 °C)] 97 3 °F (36 3 °C)  HR:  [79] 79  Resp:  [16-18] 16  BP: (123-150)/(72-95) 149/89  SpO2: [94 %-95 %] 94 %  Body mass index is 43 63 kg/m²  Input and Output Summary (last 24 hours): Intake/Output Summary (Last 24 hours) at 12/28/2021 1615  Last data filed at 12/28/2021 1218  Gross per 24 hour   Intake 1030 ml   Output 3050 ml   Net -2020 ml       Physical Exam:   Physical Exam  Vitals reviewed  Constitutional:       General: He is not in acute distress  Appearance: He is not toxic-appearing  HENT:      Head: Normocephalic and atraumatic  Eyes:      Extraocular Movements: Extraocular movements intact  Cardiovascular:      Rate and Rhythm: Normal rate and regular rhythm  Pulmonary:      Effort: Pulmonary effort is normal       Breath sounds: Wheezing present  Abdominal:      General: Bowel sounds are normal  There is no distension  Palpations: Abdomen is soft  Tenderness: There is no abdominal tenderness  Musculoskeletal:         General: Normal range of motion  Right lower leg: Edema present  Left lower leg: Edema present  Neurological:      General: No focal deficit present  Mental Status: He is alert and oriented to person, place, and time  Psychiatric:         Mood and Affect: Mood normal          Behavior: Behavior normal          Thought Content: Thought content normal           Additional Data:     Labs:  Results from last 7 days   Lab Units 12/27/21  0545 12/25/21  0438 12/25/21  0438   WBC Thousand/uL 9 84   < > 10 91*   HEMOGLOBIN g/dL 12 6   < > 11 9*   HEMATOCRIT % 39 0   < > 37 1   PLATELETS Thousands/uL 204   < > 197   NEUTROS PCT %  --   --  89*   LYMPHS PCT %  --   --  6*   MONOS PCT %  --   --  4   EOS PCT %  --   --  0    < > = values in this interval not displayed       Results from last 7 days   Lab Units 12/28/21  0608   SODIUM mmol/L 136   POTASSIUM mmol/L 3 6   CHLORIDE mmol/L 100   CO2 mmol/L 30   BUN mg/dL 38*   CREATININE mg/dL 1 43*   ANION GAP mmol/L 6   CALCIUM mg/dL 8 4   GLUCOSE RANDOM mg/dL 189*         Results from last 7 days   Lab Units 12/28/21  1057 12/28/21  0607 12/27/21  2053 12/27/21  1600 12/27/21  1030 12/27/21  1514 12/26/21  2117 12/26/21  1605 12/26/21  1104 12/26/21  0638 12/25/21  2113 12/25/21  1543   POC GLUCOSE mg/dl 254* 189* 186* 237* 220* 220* 188* 224* 318* 259* 359* 263*               Lines/Drains:  Invasive Devices  Report    Peripheral Intravenous Line            Peripheral IV 12/27/21 Dorsal (posterior); Right Forearm <1 day                      Imaging: No pertinent imaging reviewed  Recent Cultures (last 7 days):         Last 24 Hours Medication List:   Current Facility-Administered Medications   Medication Dose Route Frequency Provider Last Rate    acetaminophen  650 mg Oral Q6H PRN Sanchez Mathis MD      atorvastatin  10 mg Oral Daily With Christie Helms MD      budesonide-formoterol  2 puff Inhalation BID Sanchez Mathis MD      bumetanide  2 mg Intravenous BID Hollie Peabody, MD      guaiFENesin  600 mg Oral BID Sanchez Mathis MD      heparin (porcine)  7,500 Units Subcutaneous Q8H [address] Sanchez Mathis MD      hydrALAZINE  5 mg Intravenous Q6H PRN Ailyn Waddell MD      insulin glargine  25 Units Subcutaneous HS Marielle Galvan MD      insulin lispro  10 Units Subcutaneous TID With Meals Marielle Galvan MD      insulin lispro  2-12 Units Subcutaneous 4x Daily (with meals and at bedtime) Timbo Merritt PA-C      ipratropium  0 5 mg Nebulization TID Sanchez Mathis MD      levalbuterol  1 25 mg Nebulization TID MD Paz [facility]Mer[facility] Centerteresa Montanoer [place] 12/29/2021] IVPB builder   Intravenous Q24H Enedina Gray PA-C      montelukast  10 mg Oral HS Sanchez Mathis MD      potassium chloride  40 mEq Oral BID RODRIGUE Wilkes      sodium chloride  1 spray Each Nare Q1H PRN Sanchez Mathis MD          Today, Patient Was Seen By: Chanell Baxter PA-C    **Please Note: This note may have been constructed using a voice recognition system  **

## 2021-12-29 LAB
ANION GAP SERPL CALCULATED.3IONS-SCNC: 6 MMOL/L (ref 4–13)
BUN SERPL-MCNC: 39 MG/DL (ref 5–25)
CALCIUM SERPL-MCNC: 9 MG/DL (ref 8.3–10.1)
CHLORIDE SERPL-SCNC: 98 MMOL/L (ref 100–108)
CO2 SERPL-SCNC: 30 MMOL/L (ref 21–32)
CREAT SERPL-MCNC: 1.51 MG/DL (ref 0.6–1.3)
GFR SERPL CREATININE-BSD FRML MDRD: 51 ML/MIN/1.73SQ M
GLUCOSE SERPL-MCNC: 105 MG/DL (ref 65–140)
GLUCOSE SERPL-MCNC: 175 MG/DL (ref 65–140)
GLUCOSE SERPL-MCNC: 209 MG/DL (ref 65–140)
GLUCOSE SERPL-MCNC: 213 MG/DL (ref 65–140)
GLUCOSE SERPL-MCNC: 97 MG/DL (ref 65–140)
POTASSIUM SERPL-SCNC: 4.3 MMOL/L (ref 3.5–5.3)
SODIUM SERPL-SCNC: 134 MMOL/L (ref 136–145)

## 2021-12-29 PROCEDURE — 94760 N-INVAS EAR/PLS OXIMETRY 1: CPT

## 2021-12-29 PROCEDURE — 99223 1ST HOSP IP/OBS HIGH 75: CPT | Performed by: INTERNAL MEDICINE

## 2021-12-29 PROCEDURE — 94640 AIRWAY INHALATION TREATMENT: CPT

## 2021-12-29 PROCEDURE — 99232 SBSQ HOSP IP/OBS MODERATE 35: CPT | Performed by: NURSE PRACTITIONER

## 2021-12-29 PROCEDURE — 82948 REAGENT STRIP/BLOOD GLUCOSE: CPT

## 2021-12-29 PROCEDURE — 99231 SBSQ HOSP IP/OBS SF/LOW 25: CPT | Performed by: PHYSICIAN ASSISTANT

## 2021-12-29 PROCEDURE — 80048 BASIC METABOLIC PNL TOTAL CA: CPT | Performed by: PHYSICIAN ASSISTANT

## 2021-12-29 RX ORDER — METOLAZONE 5 MG/1
5 TABLET ORAL DAILY
Status: DISCONTINUED | OUTPATIENT
Start: 2021-12-29 | End: 2021-12-30

## 2021-12-29 RX ADMIN — LEVALBUTEROL HYDROCHLORIDE 1.25 MG: 1.25 SOLUTION, CONCENTRATE RESPIRATORY (INHALATION) at 13:28

## 2021-12-29 RX ADMIN — BUMETANIDE 2 MG: 0.25 INJECTION INTRAMUSCULAR; INTRAVENOUS at 08:54

## 2021-12-29 RX ADMIN — INSULIN GLARGINE 25 UNITS: 100 INJECTION, SOLUTION SUBCUTANEOUS at 22:01

## 2021-12-29 RX ADMIN — IPRATROPIUM BROMIDE 0.5 MG: 0.5 SOLUTION RESPIRATORY (INHALATION) at 07:46

## 2021-12-29 RX ADMIN — BUDESONIDE AND FORMOTEROL FUMARATE DIHYDRATE 2 PUFF: 160; 4.5 AEROSOL RESPIRATORY (INHALATION) at 08:54

## 2021-12-29 RX ADMIN — IPRATROPIUM BROMIDE 0.5 MG: 0.5 SOLUTION RESPIRATORY (INHALATION) at 20:30

## 2021-12-29 RX ADMIN — POTASSIUM CHLORIDE 40 MEQ: 1500 TABLET, EXTENDED RELEASE ORAL at 17:37

## 2021-12-29 RX ADMIN — POTASSIUM CHLORIDE 40 MEQ: 1500 TABLET, EXTENDED RELEASE ORAL at 08:53

## 2021-12-29 RX ADMIN — ATORVASTATIN CALCIUM 10 MG: 10 TABLET, FILM COATED ORAL at 17:29

## 2021-12-29 RX ADMIN — BUMETANIDE 2 MG: 0.25 INJECTION INTRAMUSCULAR; INTRAVENOUS at 17:29

## 2021-12-29 RX ADMIN — METOLAZONE 5 MG: 5 TABLET ORAL at 17:37

## 2021-12-29 RX ADMIN — HEPARIN SODIUM 7500 UNITS: 5000 INJECTION INTRAVENOUS; SUBCUTANEOUS at 13:13

## 2021-12-29 RX ADMIN — HEPARIN SODIUM 7500 UNITS: 5000 INJECTION INTRAVENOUS; SUBCUTANEOUS at 06:29

## 2021-12-29 RX ADMIN — HEPARIN SODIUM 7500 UNITS: 5000 INJECTION INTRAVENOUS; SUBCUTANEOUS at 22:01

## 2021-12-29 RX ADMIN — LEVALBUTEROL HYDROCHLORIDE 1.25 MG: 1.25 SOLUTION, CONCENTRATE RESPIRATORY (INHALATION) at 20:30

## 2021-12-29 RX ADMIN — GUAIFENESIN 600 MG: 600 TABLET, EXTENDED RELEASE ORAL at 17:29

## 2021-12-29 RX ADMIN — LEVALBUTEROL HYDROCHLORIDE 1.25 MG: 1.25 SOLUTION, CONCENTRATE RESPIRATORY (INHALATION) at 07:46

## 2021-12-29 RX ADMIN — BUDESONIDE AND FORMOTEROL FUMARATE DIHYDRATE 2 PUFF: 160; 4.5 AEROSOL RESPIRATORY (INHALATION) at 17:30

## 2021-12-29 RX ADMIN — IPRATROPIUM BROMIDE 0.5 MG: 0.5 SOLUTION RESPIRATORY (INHALATION) at 13:28

## 2021-12-29 RX ADMIN — MONTELUKAST 10 MG: 10 TABLET, FILM COATED ORAL at 22:01

## 2021-12-29 RX ADMIN — SODIUM CHLORIDE: 9 INJECTION, SOLUTION INTRAVENOUS at 06:28

## 2021-12-29 RX ADMIN — GUAIFENESIN 600 MG: 600 TABLET, EXTENDED RELEASE ORAL at 08:53

## 2021-12-29 NOTE — PROGRESS NOTES
Progress Note - Cardiology   David Petersen 49 y o  male MRN: [id number]  Unit/Bed#: CW2 218-01 Encounter: [id number]        Principal Problem:    Acute on chronic diastolic heart failure [facility])  Active Problems:    Essential hypertension    Type 2 diabetes mellitus with hyperglycemia (HCC)    Morbid obesity (HCC)    VICKY (obstructive sleep apnea)    Moderate persistent asthma with acute exacerbation    Stage 3 chronic kidney disease (HCC)          Assessment/Plan     1  Acute on chronic diastolic heart failure  Patient still with wheezing, orthopnea, LE edema  Lasix increased back to 60mg IV BID 12/28   In setting of hypoalbuminemia started IV bumex- 2mg BID 12/27 with some increased diuresis, no improvement in symptoms  Patient is below previous dry weight  Check BMP today  If ok consider dose of metolazone today  Follow strict I&O, daily weight and symptoms  I/O appears inaccurate  Weight up 2 lbs today? Sodium restriction 2 gram  D/t other medical issues I agreed to 2000CC FR       TTE 11/12/2021- LVEF 55 with grade 1 diastolic dysfunction   RV size and function normal   Hypokinesis basal inferior and basal inferolateral wall      Patient with RF for CAD  Consider proceeding with stress testing prior to discharge once able to lie flat       2  Moderate persistent asthma- IV steroids, respiratory protocol ( steroids likely contributing to edema)  Patient had been off and on steroids 6 months  Still with wheezing despite diuresis however still with some volume overloaded   Up till this summer patient has not had an asthma exacerbation in 27 years     Patient requesting pulmonary evaluation- sees as outpatient     3  VICKY on CPAP/morbid obesity s/p gastric bypass     4  Type 2  Diabetes-hemoglobin A1c 7%  Per Medicine     5  CKD 3-creatinine 1 4 which appears to be stable   Since September creatinine 1 3-1 5  Monitor with diuresis     6   HTN- normotensive  Currently on no anti hypertensives     7   HLD- atorvastatin 10 mg  LDL 73     8  WCT- 13 beat NSVT 12/27  Avoid BB at this time d/t active wheezing  Keep potassium greater than 4 and mag greater than 2  Repleted potassium of 3 6  Eventual ischemia evaluation    Subjective/Objective   Chief Complaint/Subjective  Chest pain  Feels shortness of breath is a bit worse this morning feels congested  Still complain of lower extremity edema  Still complaint of inability to lie flat        Vitals: /87   Pulse 79   Temp 97 7 °F (36 5 °C)   Resp 18   Ht 6' 0 99" (1 854 m) Comment: (11/22/2021) Per Chart Review  Wt (!) 151 kg (332 lb 12 8 oz)   SpO2 94%   BMI 43 92 kg/m²     Vitals:    12/28/21 0600 12/29/21 0626   Weight: (!) 150 kg (330 lb 9 6 oz) (!) 151 kg (332 lb 12 8 oz)     Orthostatic Blood Pressures      Most Recent Value   Blood Pressure 142/87 filed at 12/29/2021 0840   Patient Position - Orthostatic VS Lying filed at 12/27/2021 0025            Intake/Output Summary (Last 24 hours) at 12/29/2021 1054  Last data filed at 12/29/2021 1047  Gross per 24 hour   Intake 300 ml   Output 2275 ml   Net -1975 ml       Invasive Devices  Report    Peripheral Intravenous Line            Peripheral IV 12/27/21 Dorsal (posterior); Right Forearm 1 day                Current Facility-Administered Medications   Medication Dose Route Frequency    acetaminophen (TYLENOL) tablet 650 mg  650 mg Oral Q6H PRN    atorvastatin (LIPITOR) tablet 10 mg  10 mg Oral Daily With Dinner    budesonide-formoterol (SYMBICORT) 160-4 5 mcg/act inhaler 2 puff  2 puff Inhalation BID    bumetanide (BUMEX) injection 2 mg  2 mg Intravenous BID    guaiFENesin (MUCINEX) 12 hr tablet 600 mg  600 mg Oral BID    heparin (porcine) subcutaneous injection 7,500 Units  7,500 Units Subcutaneous Q8H [address]    hydrALAZINE (APRESOLINE) injection 5 mg  5 mg Intravenous Q6H PRN    insulin glargine (LANTUS) subcutaneous injection 25 Units 0 25 mL  25 Units Subcutaneous HS    insulin lispro (HumaLOG) 100 units/mL subcutaneous injection 10 Units  10 Units Subcutaneous TID With Meals    insulin lispro (HumaLOG) 100 units/mL subcutaneous injection 2-12 Units  2-12 Units Subcutaneous 4x Daily (with meals and at bedtime)    ipratropium (ATROVENT) 0 02 % inhalation solution 0 5 mg  0 5 mg Nebulization TID    levalbuterol (XOPENEX) inhalation solution 1 25 mg  1 25 mg Nebulization TID    methylPREDNISolone sodium succinate (Solu-MEDROL) 40 mg in sodium chloride 0 9 % 100 mL IVPB   Intravenous Q24H    montelukast (SINGULAIR) tablet 10 mg  10 mg Oral HS    potassium chloride (K-DUR,KLOR-CON) CR tablet 40 mEq  40 mEq Oral BID    sodium chloride (OCEAN) 0 65 % nasal spray 1 spray  1 spray Each Nare Q1H PRN         Physical Exam: /87   Pulse 79   Temp 97 7 °F (36 5 °C)   Resp 18   Ht 6' 0 99" (1 854 m) Comment: (11/22/2021) Per Chart Review  Wt (!) 151 kg (332 lb 12 8 oz)   SpO2 94%   BMI 43 92 kg/m²     General Appearance:    Alert, cooperative, no distress, appears stated age   Head:    Normocephalic, no scleral icterus   Eyes:    PERRL   Nose:   Nares normal, septum midline, no drainage    Throat:   Lips, mucosa, and tongue normal   Neck:   Supple, symmetrical, trachea midline,              Lungs:     Prolonged exp phase to auscultation bilaterally, respirations unlabored at rest   Chest Wall:    No tenderness or deformity    Heart:    Regular rate and rhythm, S1 and S2 normal, no murmur, rub   or gallop   Abdomen:     Soft, non-tender   Extremities:   Extremities normal, atraumatic, no cyanosis , nonpitting edema       Skin:   Skin warm   Neurologic:   Alert and oriented to person place and time, no focal deficits                 Lab Results:   Recent Results (from the past 72 hour(s))   Fingerstick Glucose (POCT)    Collection Time: 12/26/21 11:04 AM   Result Value Ref Range    POC Glucose 318 (H) 65 - 140 mg/dl   Fingerstick Glucose (POCT)    Collection Time: 12/26/21  4:05 PM   Result Value Ref Range    POC Glucose 224 (H) 65 - 140 mg/dl   Fingerstick Glucose (POCT)    Collection Time: 12/26/21  9:17 PM   Result Value Ref Range    POC Glucose 188 (H) 65 - 140 mg/dl   Basic metabolic panel    Collection Time: 12/27/21  5:45 AM   Result Value Ref Range    Sodium 137 136 - 145 mmol/L    Potassium 3 5 3 5 - 5 3 mmol/L    Chloride 100 100 - 108 mmol/L    CO2 31 21 - 32 mmol/L    ANION GAP 6 4 - 13 mmol/L    BUN 34 (H) 5 - 25 mg/dL    Creatinine 1 48 (H) 0 60 - 1 30 mg/dL    Glucose 241 (H) 65 - 140 mg/dL    Calcium 8 0 (L) 8 3 - 10 1 mg/dL    eGFR 52 ml/min/1 73sq m   CBC    Collection Time: 12/27/21  5:45 AM   Result Value Ref Range    WBC 9 84 4 31 - 10 16 Thousand/uL    RBC 4 16 3 88 - 5 62 Million/uL    Hemoglobin 12 6 12 0 - 17 0 g/dL    Hematocrit 39 0 36 5 - 49 3 %    MCV 94 82 - 98 fL    MCH 30 3 26 8 - 34 3 pg    MCHC 32 3 31 4 - 37 4 g/dL    RDW 13 5 11 6 - 15 1 %    Platelets 798 279 - 275 Thousands/uL    MPV 11 0 8 9 - 12 7 fL   Fingerstick Glucose (POCT)    Collection Time: 12/27/21  6:38 AM   Result Value Ref Range    POC Glucose 220 (H) 65 - 140 mg/dl   Fingerstick Glucose (POCT)    Collection Time: 12/27/21 10:30 AM   Result Value Ref Range    POC Glucose 220 (H) 65 - 140 mg/dl   Fingerstick Glucose (POCT)    Collection Time: 12/27/21  4:00 PM   Result Value Ref Range    POC Glucose 237 (H) 65 - 140 mg/dl   Fingerstick Glucose (POCT)    Collection Time: 12/27/21  8:53 PM   Result Value Ref Range    POC Glucose 186 (H) 65 - 140 mg/dl   Fingerstick Glucose (POCT)    Collection Time: 12/28/21  6:07 AM   Result Value Ref Range    POC Glucose 189 (H) 65 - 140 mg/dl   Basic metabolic panel    Collection Time: 12/28/21  6:08 AM   Result Value Ref Range    Sodium 136 136 - 145 mmol/L    Potassium 3 6 3 5 - 5 3 mmol/L    Chloride 100 100 - 108 mmol/L    CO2 30 21 - 32 mmol/L    ANION GAP 6 4 - 13 mmol/L    BUN 38 (H) 5 - 25 mg/dL    Creatinine 1 43 (H) 0 60 - 1 30 mg/dL    Glucose 189 (H) 65 - 140 mg/dL    Calcium 8 4 8 3 - 10 1 mg/dL    eGFR 55 ml/min/1 73sq m   Fingerstick Glucose (POCT)    Collection Time: 12/28/21 10:57 AM   Result Value Ref Range    POC Glucose 254 (H) 65 - 140 mg/dl   Fingerstick Glucose (POCT)    Collection Time: 12/28/21  4:26 PM   Result Value Ref Range    POC Glucose 162 (H) 65 - 140 mg/dl   Fingerstick Glucose (POCT)    Collection Time: 12/28/21  9:02 PM   Result Value Ref Range    POC Glucose 146 (H) 65 - 140 mg/dl   Fingerstick Glucose (POCT)    Collection Time: 12/29/21  6:02 AM   Result Value Ref Range    POC Glucose 105 65 - 140 mg/dl     Imaging: I have personally reviewed pertinent reports  Tele- nsr pvc      Counseling / Coordination of Care  Total time spent today 20 minutes  Greater than 50% of total time was spent with the patient and / or family counseling and / or coordination of care

## 2021-12-29 NOTE — UTILIZATION REVIEW
Continued Stay Review    Date: 12/29/2021                          Current Patient Class: Inpatient  Current Level of Care: Med Surg    HPI:56 y o  male initially admitted on 12/23/2021     Assessment/Plan: Pt still short of breath with orthopnea and dyspnea on exertion  Transitioned to IV Bumex by cardiology with improved diuresis  Cont IV Bumex, IV Solu Medrol decreased once daily  strict I/O's, daily weight, fluid restrictions  Sodium 2 gm restriction  Cont to monitor kidney function while receiving diuretics  Check BMP today  Wt increased by 2 lbs today  Pulmonology consulted  Pulmonology Consult; Asthma exacerbation: Continue Singulair, atrovent neb TID, symbicort twice daily, methyprednisolone 40mg IV qd, Continue diuresis with net negative fluid balance, Encourage Incentive Spirometry, supportive care with nasal spray, mucinex    Vital Signs: /83   Pulse 79   Temp (!) 97 1 °F (36 2 °C)   Resp 18   Ht 6' 0 99" (1 854 m) Comment: (11/22/2021) Per Chart Review  Wt (!) 151 kg (332 lb 12 8 oz)   SpO2 94%   BMI 43 92 kg/m²       Pertinent Labs/Diagnostic Results:   Results from last 7 days   Lab Units 12/23/21  0637   SARS-COV-2  Negative     Results from last 7 days   Lab Units 12/27/21  0545 12/25/21  0438 12/24/21  0439 12/23/21  0630 12/23/21  0630   WBC Thousand/uL 9 84 10 91* 11 25*   < > 9 87   HEMOGLOBIN g/dL 12 6 11 9* 12 4  --  13 1   HEMATOCRIT % 39 0 37 1 38 0  --  39 8   PLATELETS Thousands/uL 204 197 233   < > 263   NEUTROS ABS Thousands/µL  --  9 60* 9 93*  --  7 16    < > = values in this interval not displayed           Results from last 7 days   Lab Units 12/28/21  0608 12/27/21  0545 12/26/21  0639 12/25/21  0438 12/24/21  0439   SODIUM mmol/L 136 137 137 135* 132*   POTASSIUM mmol/L 3 6 3 5 3 5 3 9 3 7   CHLORIDE mmol/L 100 100 97* 97* 95*   CO2 mmol/L 30 31 32 32 30   ANION GAP mmol/L 6 6 8 6 7   BUN mg/dL 38* 34* 36* 33* 24   CREATININE mg/dL 1 43* 1 48* 1 71* 1 59* 1 56* EGFR ml/min/1 73sq m 55 52 44 48 49   CALCIUM mg/dL 8 4 8 0* 8 2* 8 0* 9 2   MAGNESIUM mg/dL  --   --  2 4  --  2 1         Results from last 7 days   Lab Units 12/29/21  0602 12/28/21  2102 12/28/21  1626 12/28/21  1057 12/28/21  0607 12/27/21  2053 12/27/21  1600 12/27/21  1030 12/27/21  0638 12/26/21  2117 12/26/21  1605 12/26/21  1104   POC GLUCOSE mg/dl 105 146* 162* 254* 189* 186* 237* 220* 220* 188* 224* 318*     Results from last 7 days   Lab Units 12/28/21  0608 12/27/21  0545 12/26/21  0639 12/25/21  0438 12/24/21  0439 12/23/21  0630   GLUCOSE RANDOM mg/dL 189* 241* 246* 296* 298* 187*     Results from last 7 days   Lab Units 12/23/21  1229 12/23/21  0630   HS TNI 0HR ng/L  --  20   HS TNI 2HR ng/L 19  --    HSTNI D2 ng/L -1  --        Results from last 7 days   Lab Units 12/23/21  0637   INFLUENZA A PCR  Negative   INFLUENZA B PCR  Negative   RSV PCR  Negative       Medications:   Scheduled Medications:  atorvastatin, 10 mg, Oral, Daily With Dinner  budesonide-formoterol, 2 puff, Inhalation, BID  bumetanide, 2 mg, Intravenous, BID  guaiFENesin, 600 mg, Oral, BID  heparin (porcine), 7,500 Units, Subcutaneous, Q8H [address]  insulin glargine, 25 Units, Subcutaneous, HS  insulin lispro, 10 Units, Subcutaneous, TID With Meals  insulin lispro, 2-12 Units, Subcutaneous, 4x Daily (with meals and at bedtime)  ipratropium, 0 5 mg, Nebulization, TID  levalbuterol, 1 25 mg, Nebulization, TID  montelukast, 10 mg, Oral, HS  potassium chloride, 40 mEq, Oral, BID  methylPREDNISolone sodium succinate (Solu-MEDROL) 40 mg in sodium chloride 0 9 % 100 mL IVPB  Freq: Every 24 hours Route: IV  Start: 12/29/21 0627    Continuous IV Infusions: none     PRN Meds:  acetaminophen, 650 mg, Oral, Q6H PRN  hydrALAZINE, 5 mg, Intravenous, Q6H PRN  sodium chloride, 1 spray, Each Nare, Q1H PRN        Discharge Plan: [facility]    Network Utilization Review Department  ATTENTION: Please call with any questions or concerns to [phone] and carefully listen to the prompts so that you are directed to the right person  All voicemails are confidential   Farrel Bodily all requests for admission clinical reviews, approved or denied determinations and any other requests to dedicated fax number below belonging to the campus where the patient is receiving treatment   List of dedicated fax numbers for the Facilities:  [address] DENIALS (Administrative/Medical Necessity) [phone]   [address] (Maternity/NICU/Pediatrics) [phone]   [address]  [address] Medical [place] [address] [address] [place] [address] [address] [facility] [phone]

## 2021-12-29 NOTE — PROGRESS NOTES
[address] [facility]  Progress Note - Alba Orta 1965, 49 y o  male MRN: [id number]  Unit/Bed#: CW2 218-01 Encounter: [id number]  Primary Care Provider: Fabiano Doss MD   Date and time admitted to hospital: 12/23/2021  6:07 AM    * Acute on chronic diastolic heart failure [facility])  Assessment & Plan  Wt Readings from Last 3 Encounters:   12/29/21 (!) 151 kg (332 lb 12 8 oz)   12/16/21 (!) 154 kg (339 lb)   11/22/21 (!) 158 kg (348 lb)     · Presented with TELLES/SOB, increasing LE edema, and orthopnea   Multiple recent admissions for CHF exacerbation triggered by asthma exacerbation  · Echo 11/12/21 showed ejection fraction 55% and grade 1 diastolic dysfunction  · Current home regimen is Lasix 40 mg b i d  which was recently increased by OP cardiology given repeated admissions for HF exacerbations in setting of asthma exacerbation  · Will need to reschedule outpatient stress test/ischemic workup upon discharge  · Intake and output, daily weights, fluid restriction  · Cardiology consulted, transitioned to IV Bumex with improved diuresis, though weight is up today ?accuracy     Stage 3 chronic kidney disease [facility])  Assessment & Plan  Lab Results   Component Value Date    EGFR 55 12/28/2021    EGFR 52 12/27/2021    EGFR 44 12/26/2021    CREATININE 1 43 (H) 12/28/2021    CREATININE 1 48 (H) 12/27/2021    CREATININE 1 71 (H) 12/26/2021   · Baseline creatinine 1 1-1 5  · Creatinine today 1 52  · Monitor kidney function while receiving diuretics  · Avoid nephrotoxins and hypotension    Moderate persistent asthma with acute exacerbation  Assessment & Plan  · Most recent exacerbation in November, was discharged on a steroid taper  · On admission, c/o SOB with wheezing not improved with nebs  · Started on Solu-Medrol 40 mg q 8 hours and scheduled xopenex/atrovent with respiratory protocol   · Continue symbicort  · Breathing improved but still with dyspnea and persistent wheezing, · Decreased to Solu-Medrol once daily  · Follows with Dr Jese Gunter outpt, pt requesting to see pulmonology while hospitalized given frequent re-admissions    VICKY (obstructive sleep apnea)  Assessment & Plan  · Patient cannot tolerate hospital CPAP  · Continue home CPAP as outpatient     Morbid obesity ([facility] [address] )  Assessment & Plan  · Dietary, lifestyle modifications encouraged    Type 2 diabetes mellitus with hyperglycemia [facility])  Assessment & Plan  Lab Results   Component Value Date    HGBA1C 7 0 (H) 11/10/2021       Recent Labs     21  1057 21  1626 21  2102 21  0602   POCGLU 254* 162* 146* 105       Blood Sugar Average: Last 72 hrs:  · - Increase lantus from to 25 units, and humalog 10 units TID with meals  · Avoid hypoglycemia-protocol in place  · Trend BGs, increase insulin as necessary    Essential hypertension  Assessment & Plan  · Above goal on admission  · PRN hydralazine 5 mg q6h prn for SBP>130  · Trend BPs while receiving IV diuresis        VTE Pharmacologic Prophylaxis: VTE Score: 4 Moderate Risk (Score 3-4) - Pharmacological DVT Prophylaxis Ordered: heparin  Patient Centered Rounds: I performed bedside rounds with nursing staff today  Discussions with Specialists or Other Care Team Provider: RN    Education and Discussions with Family / Patient: patient  Time Spent for Care: 20 minutes  More than 50% of total time spent on counseling and coordination of care as described above  Current Length of Stay: 6 day(s)  Current Patient Status: Inpatient   Certification Statement: The patient will continue to require additional inpatient hospital stay due to IV diuresis and steroids  Discharge Plan: Anticipate discharge in 48-72 hrs to home  Code Status: Level 1 - Full Code    Subjective:     Pt has no acute complaints, still sob with orthopnea    +TELLES     Objective:     Vitals:   Temp (24hrs), Av 5 °F (36 4 °C), Min:97 3 °F (36 3 °C), Max:97 7 °F (36 5 °C)    Temp: [97 3 °F (36 3 °C)-97 7 °F (36 5 °C)] 97 7 °F (36 5 °C)  HR:  [79] 79  Resp:  [16-18] 18  BP: (142-149)/(87-91) 142/87  SpO2:  [94 %-98 %] 94 %  Body mass index is 43 92 kg/m²  Input and Output Summary (last 24 hours): Intake/Output Summary (Last 24 hours) at 12/29/2021 0951  Last data filed at 12/29/2021 0900  Gross per 24 hour   Intake 300 ml   Output 2275 ml   Net -1975 ml       Physical Exam:   Physical Exam  Vitals reviewed  Constitutional:       General: He is not in acute distress  Appearance: Normal appearance  He is not toxic-appearing  HENT:      Head: Normocephalic and atraumatic  Eyes:      Extraocular Movements: Extraocular movements intact  Cardiovascular:      Rate and Rhythm: Normal rate and regular rhythm  Pulmonary:      Effort: Pulmonary effort is normal  No respiratory distress  Breath sounds: Wheezing present  Abdominal:      General: Bowel sounds are normal  There is no distension  Palpations: Abdomen is soft  Tenderness: There is no abdominal tenderness  Musculoskeletal:         General: Normal range of motion  Cervical back: Normal range of motion  Skin:     Comments: psoriasis   Neurological:      General: No focal deficit present  Mental Status: He is alert and oriented to person, place, and time  Psychiatric:         Mood and Affect: Mood normal          Behavior: Behavior normal          Thought Content: Thought content normal          Judgment: Judgment normal           Additional Data:     Labs:  Results from last 7 days   Lab Units 12/27/21  0545 12/25/21  0438 12/25/21  0438   WBC Thousand/uL 9 84   < > 10 91*   HEMOGLOBIN g/dL 12 6   < > 11 9*   HEMATOCRIT % 39 0   < > 37 1   PLATELETS Thousands/uL 204   < > 197   NEUTROS PCT %  --   --  89*   LYMPHS PCT %  --   --  6*   MONOS PCT %  --   --  4   EOS PCT %  --   --  0    < > = values in this interval not displayed       Results from last 7 days   Lab Units 12/28/21  0608   SODIUM mmol/L 136   POTASSIUM mmol/L 3 6   CHLORIDE mmol/L 100   CO2 mmol/L 30   BUN mg/dL 38*   CREATININE mg/dL 1 43*   ANION GAP mmol/L 6   CALCIUM mg/dL 8 4   GLUCOSE RANDOM mg/dL 189*         Results from last 7 days   Lab Units 12/29/21  0602 12/28/21  2102 12/28/21  1626 12/28/21  1057 12/28/21  0607 12/27/21  2053 12/27/21  1600 12/27/21  1030 12/27/21  0638 12/26/21  2117 12/26/21  1605 12/26/21  1104   POC GLUCOSE mg/dl 105 146* 162* 254* 189* 186* 237* 220* 220* 188* 224* 318*               Lines/Drains:  Invasive Devices  Report    Peripheral Intravenous Line            Peripheral IV 12/27/21 Dorsal (posterior); Right Forearm 1 day                      Imaging: No pertinent imaging reviewed      Recent Cultures (last 7 days):         Last 24 Hours Medication List:   Current Facility-Administered Medications   Medication Dose Route Frequency Provider Last Rate    acetaminophen  650 mg Oral Q6H PRN Marlene Benoit MD      atorvastatin  10 mg Oral Daily With Nicol Morris MD      budesonide-formoterol  2 puff Inhalation BID Marlene Benoit MD      bumetanide  2 mg Intravenous BID Jose R Celaya MD      guaiFENesin  600 mg Oral BID Marlene Benoit MD      heparin (porcine)  7,500 Units Subcutaneous Q8H [address] Marlene Benoit MD      hydrALAZINE  5 mg Intravenous Q6H PRN Andres Trejo MD      insulin glargine  25 Units Subcutaneous HS Simon Bradshaw MD      insulin lispro  10 Units Subcutaneous TID With Meals Simon Bradshaw MD      insulin lispro  2-12 Units Subcutaneous 4x Daily (with meals and at bedtime) Ellyn Aschoff, PA-C      ipratropium  0 5 mg Nebulization TID Marlene Benoit MD      levalbuterol  1 25 mg Nebulization TID Marlene Benoit MD      IVPB builder   Intravenous Q24H Neptali Jones PA-C 100 mL/hr at 12/29/21 2459    montelukast  10 mg Oral HS Marlene Benoit MD      potassium chloride  40 mEq Oral BID RODRIGUE Tamez      sodium chloride  1 spray Each Nare H PRN Lakeshia Harris MD          Today, Patient Was Seen By: Twyla Reza PA-C    **Please Note: This note may have been constructed using a voice recognition system  **

## 2021-12-29 NOTE — CONSULTS
PULMONOLOGY CONSULT NOTE     Name: Kit Hernandez   Age & Sex: 49 y o  male   MRN: [id number]  Unit/Bed#: CW2 218-01   Encounter: [id number]        Reason for consultation: Asthma exacerbation    Requesting physician: Felisha Avalos DO    Assessment:   1  Asthma exacerbation  2  Acute  CHF  3  DM 2  4  HTN  5  CKD3    Plan:    1  Moderate persistent Asthma exacerbation    Most recent exacerbation, hospitalized Nov 2021 and d/c on steroid taper    Continue Singulair, atrovent neb TID, symbicort twice daily  methyprednisolone 40mg IV qd   40mg prednisone daily to down titrate every three days by 10mg   Continue diuresis with net negative fluid balance  Encourage incentive spirometry  Respiratory protocol  supportive care with nasal spray, mucinex  Home meds: albuterol, symbicort 160-4 5, xopenex neb, spiriva, singulair  Will need to follow outpatient and will need PNA vaccine     2  Acute  CHF   Cardiology on board  fluid restriction in place  Continue diuresis with goal net negative fluid balance  Daily I&Os    3  DM 2  Management per primary team    4  HTN  Monitor for HoTN in setting of IV diuresis  Management per primary team    5  CKD3  Baseline 1 2-1 4  Management per primary team    History of Present Illness   HPI:  Kit Hernandez is a very pleasant 49 y o  male with PMHx moderate persistent asthma (follows Dr Javi Grimes), HTN, VICKY noncompliant with CPAP at home, morbid obesity s/p gastric bypass 2004, DM2 (A1c 7% 11/2021), CHF who presented to \[facility]\"" on 12 23 2021 with complaints of SOB  Patient states he also has wheezing and LE edema  Of note, patient recently seen by cardiology who increased lasix dose for 2 days without any improvement  Additionally, he has had multiple hospital admissions for CHF, most recently in November 2021  Patient states that he is compliant with home medications, and generally can ambulate without any devices about 1-2 flights of stairs   He could not walk from the bed to the bathroom without feeling "like [he] was going to collapse" thus prompting him to come in  Patient endorses orthopnea and LE edema  Also, he has pink frothy cough and coughing fits which last approximately 10-25 minutes  No changes of vision, palpitations, fevers, chills, n/v, syncope, or numbness and tingling greater than his baseline  He denies eating any salty, canned or processed foods, drinking, smoking, new pets, or any new triggers  Patient previously  for decades and then  for [employer] for 2 months in 2021, currently on disability  Review of systems:  12 point review of systems was completed and was otherwise negative except as listed in HPI  Historical Information   Past Medical History:   Diagnosis Date    Acute gastritis without hemorrhage     last assessed 3/24/17    Asthma     Diabetes mellitus (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    Social History: intermittently smoked cigars, currently doesn't smoke, previous drinking history but unable to quantify, no IV or intranasal drug use    Meds/Allergies   Current Facility-Administered Medications   Medication Dose Route Frequency    acetaminophen (TYLENOL) tablet 650 mg  650 mg Oral Q6H PRN    atorvastatin (LIPITOR) tablet 10 mg  10 mg Oral Daily With Dinner    budesonide-formoterol (SYMBICORT) 160-4 5 mcg/act inhaler 2 puff  2 puff Inhalation BID    bumetanide (BUMEX) injection 2 mg  2 mg Intravenous BID    guaiFENesin (MUCINEX) 12 hr tablet 600 mg  600 mg Oral BID    heparin (porcine) subcutaneous injection 7,500 Units  7,500 Units Subcutaneous Q8H [address]    hydrALAZINE (APRESOLINE) injection 5 mg  5 mg Intravenous Q6H PRN    insulin glargine (LANTUS) subcutaneous injection 25 Units 0 25 mL  25 Units Subcutaneous HS    insulin lispro (HumaLOG) 100 units/mL subcutaneous injection 10 Units  10 Units Subcutaneous TID With Meals    insulin lispro (HumaLOG) 100 units/mL subcutaneous injection 2-12 Units  2-12 Units Subcutaneous 4x Daily (with meals and at bedtime)    ipratropium (ATROVENT) 0 02 % inhalation solution 0 5 mg  0 5 mg Nebulization TID    levalbuterol (XOPENEX) inhalation solution 1 25 mg  1 25 mg Nebulization TID    methylPREDNISolone sodium succinate (Solu-MEDROL) 40 mg in sodium chloride 0 9 % 100 mL IVPB   Intravenous Q24H    montelukast (SINGULAIR) tablet 10 mg  10 mg Oral HS    potassium chloride (K-DUR,KLOR-CON) CR tablet 40 mEq  40 mEq Oral BID    sodium chloride (OCEAN) 0 65 % nasal spray 1 spray  1 spray Each Nare Q1H PRN     Medications Prior to Admission   Medication    albuterol (PROVENTIL HFA,VENTOLIN HFA) 90 mcg/act inhaler    atorvastatin (LIPITOR) 10 mg tablet    budesonide-formoterol (Symbicort) 160-4 5 mcg/act inhaler    guaiFENesin (MUCINEX) 600 mg 12 hr tablet    Januvia 100 MG tablet    levalbuterol (Xopenex) 1 25 mg/3 mL nebulizer solution    metFORMIN (GLUCOPHAGE) 500 mg tablet    MULTIPLE VITAMINS-CALCIUM PO    potassium chloride (K-DUR,KLOR-CON) 10 mEq tablet    sodium chloride (OCEAN) 0 65 % nasal spray    tiotropium (Spiriva Respimat) 1 25 MCG/ACT AERS inhaler    torsemide (DEMADEX) 20 mg tablet    Accu-Chek FastClix Lancets MISC    Blood Glucose Monitoring Suppl (Accu-Chek Guide) w/Device KIT    cephalexin (KEFLEX) 500 mg capsule    glucose blood (Accu-Chek Guide) test strip    montelukast (SINGULAIR) 10 mg tablet     Allergies   Allergen Reactions    Azithromycin Other (See Comments)     Shaky, uneasy feeling     Bactrim [Sulfamethoxazole-Trimethoprim] Other (See Comments)     shakiness       Vitals: Blood pressure 142/87, pulse 79, temperature 97 7 °F (36 5 °C), resp  rate 18, height 6' 0 99" (1 854 m), weight (!) 151 kg (332 lb 12 8 oz), SpO2 94 %  , Body mass index is 43 92 kg/m²  Intake/Output Summary (Last 24 hours) at 12/29/2021 1004  Last data filed at 12/29/2021 0900  Gross per 24 hour   Intake 300 ml   Output 2275 ml   Net -1975 ml       Physical Exam  Constitutional:       Appearance: He is obese  Eyes:      Conjunctiva/sclera: Conjunctivae normal    Cardiovascular:      Rate and Rhythm: Normal rate and regular rhythm  Heart sounds: No murmur heard  No gallop  Pulmonary:      Effort: Pulmonary effort is normal       Breath sounds: Wheezing (expiratory, diffuse) present  No rhonchi or rales  Abdominal:      General: Bowel sounds are normal       Palpations: Abdomen is soft  Tenderness: There is no abdominal tenderness  There is no guarding  Skin:     General: Skin is warm and dry  Findings: No erythema  Neurological:      Mental Status: He is alert  Psychiatric:         Mood and Affect: Mood normal          Behavior: Behavior normal          Labs: I have personally reviewed pertinent lab results    Laboratory and Diagnostics  Results from last 7 days   Lab Units 12/27/21  0545 12/25/21  0438 12/24/21  0439 12/23/21  0630   WBC Thousand/uL 9 84 10 91* 11 25* 9 87   HEMOGLOBIN g/dL 12 6 11 9* 12 4 13 1   HEMATOCRIT % 39 0 37 1 38 0 39 8   PLATELETS Thousands/uL 204 197 233 263   NEUTROS PCT %  --  89* 89* 72   MONOS PCT %  --  4 5 8     Results from last 7 days   Lab Units 12/28/21  0608 12/27/21  0545 12/26/21  0639 12/25/21  0438 12/24/21  0439 12/23/21  0630   SODIUM mmol/L 136 137 137 135* 132* 134*   POTASSIUM mmol/L 3 6 3 5 3 5 3 9 3 7 3 7   CHLORIDE mmol/L 100 100 97* 97* 95* 96*   CO2 mmol/L 30 31 32 32 30 31   ANION GAP mmol/L 6 6 8 6 7 7   BUN mg/dL 38* 34* 36* 33* 24 16   CREATININE mg/dL 1 43* 1 48* 1 71* 1 59* 1 56* 1 52*   CALCIUM mg/dL 8 4 8 0* 8 2* 8 0* 9 2 8 3   GLUCOSE RANDOM mg/dL 189* 241* 246* 296* 298* 187*     Results from last 7 days   Lab Units 21  0639 21  0439   MAGNESIUM mg/dL 2 4 2 1                       Imaging and other studies: I have personally reviewed pertinent reports  XR chest portable    Result Date: 2021  Impression: No acute cardiopulmonary disease  Workstation performed: [id number]       Pulmonary function testin2021:   Results:  FVC: 3 31 L       61 % predicted  FEV1: 1 63 L     39 % predicted  FEV1/FVC Ratio: 49 %     After administration of bronchodilator   FVC: 3 89 L, 72 % predicted, 17 % change  FEV1: 2 45 L, 58 % predicted, 50 % change     Lung volumes by body plethysmography:   Total Lung Capacity 98 % predicted   Residual volume 183 % predicted     DLCO corrected for patients hemoglobin level: 65 %     Interpretation:     · Severe obstructive airflow defect on spirometry     · Significant improvement in airflow or forced vital capacity in response to the administration of bronchodilator per ATS standards      · Air trapping as indicated by the lung volumes     · Mild decrease in diffusion capacity     · Flow-volume loop consistent with obstruction      EKG, Pathology, and Other Studies: I have personally reviewed pertinent reports  Echo 2021: EF 55%, g1 DD, basal inferior and inferolateral hypokinesis, trace TR    Code Status: Level 1 - Full Code    VTE Pharmacologic Prophylaxis: Heparin  VTE Mechanical Prophylaxis: sequential compression device    Disclaimer: Portions of the record may have been created with voice recognition software  Occasional wrong word or "sound a like" substitutions may have occurred due to the inherent limitations of voice recognition software   Careful consideration should be taken to recognize, using context, where substitutions have occurred  Katey Campbell,   DO

## 2021-12-29 NOTE — ASSESSMENT & PLAN NOTE
· Most recent exacerbation in November, was discharged on a steroid taper  · On admission, c/o SOB with wheezing not improved with nebs  · Started on Solu-Medrol 40 mg q 8 hours and scheduled xopenex/atrovent with respiratory protocol   · Continue symbicort  · Breathing improved but still with dyspnea and persistent wheezing,   · Decreased to Solu-Medrol once daily  · Follows with Dr Beata jaramillopt, pt requesting to see pulmonology while hospitalized given frequent re-admissions

## 2021-12-29 NOTE — ASSESSMENT & PLAN NOTE
Lab Results   Component Value Date    HGBA1C 7 0 (H) 11/10/2021       Recent Labs     12/28/21  1057 12/28/21  1626 12/28/21  2102 12/29/21  0602   POCGLU 254* 162* 146* 105       Blood Sugar Average: Last 72 hrs:  · 12/25- Increase lantus from to 25 units, and humalog 10 units TID with meals  · Avoid hypoglycemia-protocol in place  · Trend BGs, increase insulin as necessary

## 2021-12-29 NOTE — ASSESSMENT & PLAN NOTE
Wt Readings from Last 3 Encounters:   12/29/21 (!) 151 kg (332 lb 12 8 oz)   12/16/21 (!) 154 kg (339 lb)   11/22/21 (!) 158 kg (348 lb)     · Presented with TELLES/SOB, increasing LE edema, and orthopnea   Multiple recent admissions for CHF exacerbation triggered by asthma exacerbation  · Echo 11/12/21 showed ejection fraction 55% and grade 1 diastolic dysfunction  · Current home regimen is Lasix 40 mg b i d  which was recently increased by OP cardiology given repeated admissions for HF exacerbations in setting of asthma exacerbation  · Will need to reschedule outpatient stress test/ischemic workup upon discharge  · Intake and output, daily weights, fluid restriction  · Cardiology consulted, transitioned to IV Bumex with improved diuresis, though weight is up today ?accuracy

## 2021-12-30 LAB
ANION GAP SERPL CALCULATED.3IONS-SCNC: 5 MMOL/L (ref 4–13)
BUN SERPL-MCNC: 42 MG/DL (ref 5–25)
CALCIUM SERPL-MCNC: 8.8 MG/DL (ref 8.3–10.1)
CHLORIDE SERPL-SCNC: 99 MMOL/L (ref 100–108)
CO2 SERPL-SCNC: 31 MMOL/L (ref 21–32)
CREAT SERPL-MCNC: 1.59 MG/DL (ref 0.6–1.3)
GFR SERPL CREATININE-BSD FRML MDRD: 48 ML/MIN/1.73SQ M
GLUCOSE SERPL-MCNC: 106 MG/DL (ref 65–140)
GLUCOSE SERPL-MCNC: 107 MG/DL (ref 65–140)
GLUCOSE SERPL-MCNC: 126 MG/DL (ref 65–140)
GLUCOSE SERPL-MCNC: 130 MG/DL (ref 65–140)
GLUCOSE SERPL-MCNC: 74 MG/DL (ref 65–140)
POTASSIUM SERPL-SCNC: 3.7 MMOL/L (ref 3.5–5.3)
SODIUM SERPL-SCNC: 135 MMOL/L (ref 136–145)

## 2021-12-30 PROCEDURE — 80048 BASIC METABOLIC PNL TOTAL CA: CPT | Performed by: PHYSICIAN ASSISTANT

## 2021-12-30 PROCEDURE — 94760 N-INVAS EAR/PLS OXIMETRY 1: CPT

## 2021-12-30 PROCEDURE — 82948 REAGENT STRIP/BLOOD GLUCOSE: CPT

## 2021-12-30 PROCEDURE — 94640 AIRWAY INHALATION TREATMENT: CPT

## 2021-12-30 PROCEDURE — 99232 SBSQ HOSP IP/OBS MODERATE 35: CPT | Performed by: PHYSICIAN ASSISTANT

## 2021-12-30 PROCEDURE — 99232 SBSQ HOSP IP/OBS MODERATE 35: CPT | Performed by: NURSE PRACTITIONER

## 2021-12-30 RX ORDER — POTASSIUM CHLORIDE 20 MEQ/1
40 TABLET, EXTENDED RELEASE ORAL ONCE
Status: COMPLETED | OUTPATIENT
Start: 2021-12-30 | End: 2021-12-30

## 2021-12-30 RX ADMIN — HEPARIN SODIUM 7500 UNITS: 5000 INJECTION INTRAVENOUS; SUBCUTANEOUS at 06:09

## 2021-12-30 RX ADMIN — GUAIFENESIN 600 MG: 600 TABLET, EXTENDED RELEASE ORAL at 08:33

## 2021-12-30 RX ADMIN — HEPARIN SODIUM 7500 UNITS: 5000 INJECTION INTRAVENOUS; SUBCUTANEOUS at 21:38

## 2021-12-30 RX ADMIN — IPRATROPIUM BROMIDE 0.5 MG: 0.5 SOLUTION RESPIRATORY (INHALATION) at 07:05

## 2021-12-30 RX ADMIN — MONTELUKAST 10 MG: 10 TABLET, FILM COATED ORAL at 21:38

## 2021-12-30 RX ADMIN — INSULIN GLARGINE 25 UNITS: 100 INJECTION, SOLUTION SUBCUTANEOUS at 21:39

## 2021-12-30 RX ADMIN — BUMETANIDE 2 MG: 0.25 INJECTION INTRAMUSCULAR; INTRAVENOUS at 17:00

## 2021-12-30 RX ADMIN — IPRATROPIUM BROMIDE 0.5 MG: 0.5 SOLUTION RESPIRATORY (INHALATION) at 19:34

## 2021-12-30 RX ADMIN — BUDESONIDE AND FORMOTEROL FUMARATE DIHYDRATE 2 PUFF: 160; 4.5 AEROSOL RESPIRATORY (INHALATION) at 18:04

## 2021-12-30 RX ADMIN — ATORVASTATIN CALCIUM 10 MG: 10 TABLET, FILM COATED ORAL at 17:00

## 2021-12-30 RX ADMIN — POTASSIUM CHLORIDE 40 MEQ: 1500 TABLET, EXTENDED RELEASE ORAL at 08:33

## 2021-12-30 RX ADMIN — POTASSIUM CHLORIDE 40 MEQ: 1500 TABLET, EXTENDED RELEASE ORAL at 13:01

## 2021-12-30 RX ADMIN — METOLAZONE 5 MG: 5 TABLET ORAL at 08:33

## 2021-12-30 RX ADMIN — BUDESONIDE AND FORMOTEROL FUMARATE DIHYDRATE 2 PUFF: 160; 4.5 AEROSOL RESPIRATORY (INHALATION) at 08:33

## 2021-12-30 RX ADMIN — IPRATROPIUM BROMIDE 0.5 MG: 0.5 SOLUTION RESPIRATORY (INHALATION) at 13:59

## 2021-12-30 RX ADMIN — LEVALBUTEROL HYDROCHLORIDE 1.25 MG: 1.25 SOLUTION, CONCENTRATE RESPIRATORY (INHALATION) at 19:34

## 2021-12-30 RX ADMIN — LEVALBUTEROL HYDROCHLORIDE 1.25 MG: 1.25 SOLUTION, CONCENTRATE RESPIRATORY (INHALATION) at 07:05

## 2021-12-30 RX ADMIN — POTASSIUM CHLORIDE 40 MEQ: 1500 TABLET, EXTENDED RELEASE ORAL at 17:00

## 2021-12-30 RX ADMIN — HEPARIN SODIUM 7500 UNITS: 5000 INJECTION INTRAVENOUS; SUBCUTANEOUS at 13:01

## 2021-12-30 RX ADMIN — BUMETANIDE 2 MG: 0.25 INJECTION INTRAMUSCULAR; INTRAVENOUS at 08:35

## 2021-12-30 RX ADMIN — ACETAMINOPHEN 650 MG: 325 TABLET, FILM COATED ORAL at 13:05

## 2021-12-30 RX ADMIN — LEVALBUTEROL HYDROCHLORIDE 1.25 MG: 1.25 SOLUTION, CONCENTRATE RESPIRATORY (INHALATION) at 13:59

## 2021-12-30 RX ADMIN — GUAIFENESIN 600 MG: 600 TABLET, EXTENDED RELEASE ORAL at 17:00

## 2021-12-30 NOTE — DISCHARGE INSTRUCTIONS
Take your medications as directed, and keep your follow up appointments  Adhere to a heart healthy lifestyle, maintaining a low sodium diet  Daily weight and record    If your weight increases 2-3 lbs in one day, or 5 lbs in 2 days, you are short of breath or have lower extremity swelling, please call  [facility] office  at [phone]

## 2021-12-30 NOTE — ASSESSMENT & PLAN NOTE
Lab Results   Component Value Date    EGFR 48 12/30/2021    EGFR 51 12/29/2021    EGFR 55 12/28/2021    CREATININE 1 59 (H) 12/30/2021    CREATININE 1 51 (H) 12/29/2021    CREATININE 1 43 (H) 12/28/2021   · Baseline creatinine 1 1-1 5    Has been stable with diuresis   · Monitor kidney function while receiving diuretics  · Avoid nephrotoxins and hypotension

## 2021-12-30 NOTE — PROGRESS NOTES
[address] [facility]  Progress Note - Alba Orta 1965, 49 y o  male MRN: [id number]  Unit/Bed#: CW2 218-01 Encounter: [id number]  Primary Care Provider: Fabiano Doss MD   Date and time admitted to hospital: 12/23/2021  6:07 AM    * Acute on chronic diastolic heart failure [facility])  Assessment & Plan  Wt Readings from Last 3 Encounters:   12/30/21 (!) 148 kg (325 lb 9 9 oz)   12/16/21 (!) 154 kg (339 lb)   11/22/21 (!) 158 kg (348 lb)     · Presented with TELLES/SOB, increasing LE edema, and orthopnea  Multiple recent admissions for CHF exacerbation triggered by asthma exacerbation  · Echo 11/12/21 showed ejection fraction 55% and grade 1 diastolic dysfunction  · Current home regimen is Lasix 40 mg b i d  which was recently increased by OP cardiology given repeated admissions for HF exacerbations in setting of asthma exacerbation  · Will need to reschedule outpatient stress test/ischemic workup upon discharge  · Intake and output, daily weights, fluid restriction  · Cardiology consulted, transitioned to IV Bumex with improved diuresis with dose of metolazone given 12/29 with good response  Weight is down today     Stage 3 chronic kidney disease [facility])  Assessment & Plan  Lab Results   Component Value Date    EGFR 48 12/30/2021    EGFR 51 12/29/2021    EGFR 55 12/28/2021    CREATININE 1 59 (H) 12/30/2021    CREATININE 1 51 (H) 12/29/2021    CREATININE 1 43 (H) 12/28/2021   · Baseline creatinine 1 1-1 5  Has been stable with diuresis   · Monitor kidney function while receiving diuretics  · Avoid nephrotoxins and hypotension    Moderate asthma  Assessment & Plan  · Most recent exacerbation in November, was discharged on a steroid taper  · On admission, c/o SOB with wheezing not improved with nebs  · Pulmonology consulted, asthma exacerbation now ruled out    Sx and wheezing all related to CHF  · Discontinue IV steroids  · Continue nebs/inhalers  · Outpt f/u with pulmonology     VICKY (obstructive sleep apnea)  Assessment & Plan  · Patient cannot tolerate hospital CPAP  · Continue home CPAP as outpatient     Morbid obesity ([facility] [address] )  Assessment & Plan  · Dietary, lifestyle modifications encouraged    Type 2 diabetes mellitus with hyperglycemia [facility])  Assessment & Plan  Lab Results   Component Value Date    HGBA1C 7 0 (H) 11/10/2021       Recent Labs     21  1110 21  1605 21  2051 21  0558   POCGLU 209* 175* 97 107       Blood Sugar Average: Last 72 hrs:  · - Increase lantus from to 25 units, and humalog 10 units TID with meals  · Avoid hypoglycemia-protocol in place  · Trend BGs, increase insulin as necessary    Essential hypertension  Assessment & Plan  · Above goal on admission  · PRN hydralazine 5 mg q6h prn for SBP>130  · Trend BPs while receiving IV diuresis        VTE Pharmacologic Prophylaxis: VTE Score: 4 Moderate Risk (Score 3-4) - Pharmacological DVT Prophylaxis Ordered: heparin  Patient Centered Rounds: I performed bedside rounds with nursing staff today  Discussions with Specialists or Other Care Team Provider: cardiology    Education and Discussions with Family / Patient: Patient declined call to   Time Spent for Care: 20 minutes  More than 50% of total time spent on counseling and coordination of care as described above  Current Length of Stay: 7 day(s)  Current Patient Status: Inpatient   Certification Statement: The patient will continue to require additional inpatient hospital stay due to IV diuresis  Discharge Plan: Anticipate discharge in 24-48 hrs to home  Code Status: Level 1 - Full Code    Subjective:   Pt has no complaints, SOB/orthopnea improving    Objective:     Vitals:   Temp (24hrs), Av 2 °F (36 8 °C), Min:98 °F (36 7 °C), Max:98 3 °F (36 8 °C)    Temp:  [98 °F (36 7 °C)-98 3 °F (36 8 °C)] 98 °F (36 7 °C)  Resp:  [18] 18  BP: (104-139)/(62-83) 104/62  SpO2:  [94 %-95 %] 94 %  Body mass index is 42 97 kg/m²  Input and Output Summary (last 24 hours): Intake/Output Summary (Last 24 hours) at 12/30/2021 1120  Last data filed at 12/30/2021 0901  Gross per 24 hour   Intake 1640 ml   Output 4370 ml   Net -2730 ml       Physical Exam:   Physical Exam  Vitals reviewed  Constitutional:       General: He is not in acute distress  Appearance: He is not toxic-appearing  HENT:      Head: Normocephalic and atraumatic  Eyes:      Extraocular Movements: Extraocular movements intact  Cardiovascular:      Rate and Rhythm: Normal rate and regular rhythm  Pulmonary:      Effort: Pulmonary effort is normal  No respiratory distress  Breath sounds: Wheezing present  Abdominal:      General: Bowel sounds are normal  There is no distension  Palpations: Abdomen is soft  Tenderness: There is no abdominal tenderness  Musculoskeletal:         General: Normal range of motion  Neurological:      General: No focal deficit present  Mental Status: He is alert and oriented to person, place, and time  Psychiatric:         Mood and Affect: Mood normal          Behavior: Behavior normal          Thought Content: Thought content normal           Additional Data:     Labs:  Results from last 7 days   Lab Units 12/27/21  0545 12/25/21  0438 12/25/21  0438   WBC Thousand/uL 9 84   < > 10 91*   HEMOGLOBIN g/dL 12 6   < > 11 9*   HEMATOCRIT % 39 0   < > 37 1   PLATELETS Thousands/uL 204   < > 197   NEUTROS PCT %  --   --  89*   LYMPHS PCT %  --   --  6*   MONOS PCT %  --   --  4   EOS PCT %  --   --  0    < > = values in this interval not displayed       Results from last 7 days   Lab Units 12/30/21  0522   SODIUM mmol/L 135*   POTASSIUM mmol/L 3 7   CHLORIDE mmol/L 99*   CO2 mmol/L 31   BUN mg/dL 42*   CREATININE mg/dL 1 59*   ANION GAP mmol/L 5   CALCIUM mg/dL 8 8   GLUCOSE RANDOM mg/dL 106         Results from last 7 days   Lab Units 12/30/21  0558 12/29/21  2051 12/29/21  1605 12/29/21  1110 12/29/21  0602 12/28/21  2102 12/28/21  1626 12/28/21  1057 12/28/21  0607 12/27/21  2053 12/27/21  1600 12/27/21  1030   POC GLUCOSE mg/dl 107 97 175* 209* 105 146* 162* 254* 189* 186* 237* 220*               Lines/Drains:  Invasive Devices  Report    Peripheral Intravenous Line            Peripheral IV 12/27/21 Dorsal (posterior); Right Forearm 2 days                      Imaging: No pertinent imaging reviewed  Recent Cultures (last 7 days):         Last 24 Hours Medication List:   Current Facility-Administered Medications   Medication Dose Route Frequency Provider Last Rate    acetaminophen  650 mg Oral Q6H PRN Orlando Mcgarry MD      atorvastatin  10 mg Oral Daily With Clara Koroma MD      budesonide-formoterol  2 puff Inhalation BID Orlando Mcgarry MD      bumetanide  2 mg Intravenous BID Erika Lauren MD      guaiFENesin  600 mg Oral BID Orlando Mcgarry MD      heparin (porcine)  7,500 Units Subcutaneous Q8H [address] Orlando Mcgarry MD      hydrALAZINE  5 mg Intravenous Q6H PRN Mariama Lang MD      insulin glargine  25 Units Subcutaneous HS Nixon Zavaleta MD      insulin lispro  10 Units Subcutaneous TID With Meals Nixon Zavaleta MD      insulin lispro  2-12 Units Subcutaneous 4x Daily (with meals and at bedtime) Natalia Baugh PA-C      ipratropium  0 5 mg Nebulization TID Orlando Mcgarry MD      levalbuterol  1 25 mg Nebulization TID Orlando Mcgarry MD      montelukast  10 mg Oral HS Orlando Mcgarry MD      potassium chloride  40 mEq Oral BID RODRIGUE Moore      potassium chloride  40 mEq Oral Once RODRIGUE Hamilton      sodium chloride  1 spray Each Nare Q1H PRN Orlando Mcgarry MD          Today, Patient Was Seen By: Puneet Ellsworth PA-C    **Please Note: This note may have been constructed using a voice recognition system  **

## 2021-12-30 NOTE — PLAN OF CARE
Problem: Nutrition/Hydration-ADULT  Goal: Nutrient/Hydration intake appropriate for improving, restoring or maintaining nutritional needs  Description: Monitor and assess patient's nutrition/hydration status for malnutrition  Collaborate with interdisciplinary team and initiate plan and interventions as ordered  Monitor patient's weight and dietary intake as ordered or per policy  Utilize nutrition screening tool and intervene as necessary  Determine patient's food preferences and provide high-protein, high-caloric foods as appropriate       INTERVENTIONS:  - Monitor oral intake, urinary output, labs, and treatment plans  - Assess nutrition and hydration status and recommend course of action  - Evaluate amount of meals eaten  - Assist patient with eating if necessary   - Allow adequate time for meals  - Recommend/ encourage appropriate diets, oral nutritional supplements, and vitamin/mineral supplements  - Order, calculate, and assess calorie counts as needed  - Recommend, monitor, and adjust tube feedings and TPN/PPN based on assessed needs  - Assess need for intravenous fluids  - Provide specific nutrition/hydration education as appropriate  - Include patient/family/caregiver in decisions related to nutrition  Outcome: Progressing     Problem: PAIN - ADULT  Goal: Verbalizes/displays adequate comfort level or baseline comfort level  Description: Interventions:  - Encourage patient to monitor pain and request assistance  - Assess pain using appropriate pain scale  - Administer analgesics based on type and severity of pain and evaluate response  - Implement non-pharmacological measures as appropriate and evaluate response  - Consider cultural and social influences on pain and pain management  - Notify physician/advanced practitioner if interventions unsuccessful or patient reports new pain  Outcome: Progressing     Problem: INFECTION - ADULT  Goal: Absence or prevention of progression during hospitalization  Description: INTERVENTIONS:  - Assess and monitor for signs and symptoms of infection  - Monitor lab/diagnostic results  - Monitor all insertion sites, i e  indwelling lines, tubes, and drains  - Monitor endotracheal if appropriate and nasal secretions for changes in amount and color  - [place] appropriate cooling/warming therapies per order  - Administer medications as ordered  - Instruct and encourage patient and family to use good hand hygiene technique  - Identify and instruct in appropriate isolation precautions for identified infection/condition  Outcome: Progressing     Problem: Knowledge Deficit  Goal: Patient/family/caregiver demonstrates understanding of disease process, treatment plan, medications, and discharge instructions  Description: Complete learning assessment and assess knowledge base  Interventions:  - Provide teaching at level of understanding  - Provide teaching via preferred learning methods  Outcome: Progressing     Problem: Knowledge Deficit  Goal: Patient/family/caregiver demonstrates understanding of disease process, treatment plan, medications, and discharge instructions  Description: Complete learning assessment and assess knowledge base    Interventions:  - Provide teaching at level of understanding  - Provide teaching via preferred learning methods  Outcome: Progressing

## 2021-12-30 NOTE — ASSESSMENT & PLAN NOTE
Wt Readings from Last 3 Encounters:   12/30/21 (!) 148 kg (325 lb 9 9 oz)   12/16/21 (!) 154 kg (339 lb)   11/22/21 (!) 158 kg (348 lb)     · Presented with TELLES/SOB, increasing LE edema, and orthopnea  Multiple recent admissions for CHF exacerbation triggered by asthma exacerbation  · Echo 11/12/21 showed ejection fraction 55% and grade 1 diastolic dysfunction  · Current home regimen is Lasix 40 mg b i d  which was recently increased by OP cardiology given repeated admissions for HF exacerbations in setting of asthma exacerbation  · Will need to reschedule outpatient stress test/ischemic workup upon discharge  · Intake and output, daily weights, fluid restriction  · Cardiology consulted, transitioned to IV Bumex with improved diuresis with dose of metolazone given 12/29 with good response    Weight is down today

## 2021-12-30 NOTE — ASSESSMENT & PLAN NOTE
· Most recent exacerbation in November, was discharged on a steroid taper  · On admission, c/o SOB with wheezing not improved with nebs  · Pulmonology consulted, asthma exacerbation now ruled out    Sx and wheezing all related to CHF  · Discontinue IV steroids  · Continue nebs/inhalers  · Outpt f/u with pulmonology

## 2021-12-30 NOTE — ASSESSMENT & PLAN NOTE
Lab Results   Component Value Date    HGBA1C 7 0 (H) 11/10/2021       Recent Labs     12/29/21  1110 12/29/21  1605 12/29/21 2051 12/30/21  0558   POCGLU 209* 175* 97 107       Blood Sugar Average: Last 72 hrs:  · 12/25- Increase lantus from to 25 units, and humalog 10 units TID with meals  · Avoid hypoglycemia-protocol in place  · Trend BGs, increase insulin as necessary

## 2021-12-30 NOTE — PROGRESS NOTES
Progress Note - Cardiology   Alba Orta 49 y o  male MRN: [id number]  Unit/Bed#: CW2 218-01 Encounter: [id number]        Principal Problem:    Acute on chronic diastolic heart failure [facility])  Active Problems:    Essential hypertension    Type 2 diabetes mellitus with hyperglycemia (HCC)    Morbid obesity ([facility])    VICKY (obstructive sleep apnea)    Moderate persistent asthma with acute exacerbation    Stage 3 chronic kidney disease (HCC)        Assessment/Plan     1  Acute on chronic diastolic heart failure  Lasix increased back to 60mg IV BID 12/28   In setting of hypoalbuminemia started IV bumex- 2mg BID 12/27 with some increased diuresis, no improvement in symptoms  Patient is below previous dry weight  Still with c/o SOB, wheezing, orthopnea, LE edema  Added metolazone 5mg , great response  Given 5mg this am   Follow strict I&O, daily weight and symptoms  Negative 3 liters  Weight pending today  Sodium restriction 2 gram  D/t other medical issues I agreed to [facility]       TTE 11/12/2021- LVEF 55 with grade 1 diastolic dysfunction   RV size and function normal   Hypokinesis basal inferior and basal inferolateral wall      Patient with RF for CAD  Consider proceeding with stress testing prior to discharge once able to lie flat       2  Moderate persistent asthma- IV steroids, respiratory protocol ( steroids likely contributing to fluid retention)  Patient had been off and on steroids 6 months  Still with wheezing despite diuresis however still with some volume overloaded   Up till this summer patient has not had an asthma exacerbation in 27 years     Patient requesting pulmonary evaluation- feels not asthma exacerbation     3  VICKY on CPAP/morbid obesity s/p gastric bypass     4  Type 2  Diabetes-hemoglobin A1c 7%  Per Medicine     5  CKD 3-creatinine 1 4 which appears to be stable   Since September creatinine 1 3-1 5  Monitor with diuresis     6   HTN- normotensive  Currently on no anti hypertensives     7  HLD- atorvastatin 10 mg  LDL 73     8  WCT- 13 beat NSVT 12/27  Avoid BB at this time d/t active wheezing  Keep potassium greater than 4 and mag greater than 2  Tele- PVC/s couplets  Eventual ischemia evaluation       Subjective/Objective   Chief Complaint/Subjective  Patient with improved shortness of breath wheezing orthopnea and lower extremity edema  Diuresing well from metolazone        Vitals: /62   Pulse 79   Temp 98 °F (36 7 °C) (Oral)   Resp 18   Ht 6' 0 99" (1 854 m) Comment: (11/22/2021) Per Chart Review  Wt (!) 151 kg (332 lb 12 8 oz)   SpO2 94%   BMI 43 92 kg/m²     Vitals:    12/28/21 0600 12/29/21 0626   Weight: (!) 150 kg (330 lb 9 6 oz) (!) 151 kg (332 lb 12 8 oz)     Orthostatic Blood Pressures      Most Recent Value   Blood Pressure 104/62 filed at 12/30/2021 0028   Patient Position - Orthostatic VS Lying filed at 12/27/2021 0025            Intake/Output Summary (Last 24 hours) at 12/30/2021 0953  Last data filed at 12/30/2021 0840  Gross per 24 hour   Intake 1150 ml   Output 4270 ml   Net -3120 ml       Invasive Devices  Report    Peripheral Intravenous Line            Peripheral IV 12/27/21 Dorsal (posterior); Right Forearm 2 days                Current Facility-Administered Medications   Medication Dose Route Frequency    acetaminophen (TYLENOL) tablet 650 mg  650 mg Oral Q6H PRN    atorvastatin (LIPITOR) tablet 10 mg  10 mg Oral Daily With Dinner    budesonide-formoterol (SYMBICORT) 160-4 5 mcg/act inhaler 2 puff  2 puff Inhalation BID    bumetanide (BUMEX) injection 2 mg  2 mg Intravenous BID    guaiFENesin (MUCINEX) 12 hr tablet 600 mg  600 mg Oral BID    heparin (porcine) subcutaneous injection 7,500 Units  7,500 Units Subcutaneous Q8H [address]    hydrALAZINE (APRESOLINE) injection 5 mg  5 mg Intravenous Q6H PRN    insulin glargine (LANTUS) subcutaneous injection 25 Units 0 25 mL  25 Units Subcutaneous HS    insulin lispro (HumaLOG) 100 units/mL subcutaneous injection 10 Units  10 Units Subcutaneous TID With Meals    insulin lispro (HumaLOG) 100 units/mL subcutaneous injection 2-12 Units  2-12 Units Subcutaneous 4x Daily (with meals and at bedtime)    ipratropium (ATROVENT) 0 02 % inhalation solution 0 5 mg  0 5 mg Nebulization TID    levalbuterol (XOPENEX) inhalation solution 1 25 mg  1 25 mg Nebulization TID    montelukast (SINGULAIR) tablet 10 mg  10 mg Oral HS    potassium chloride (K-DUR,KLOR-CON) CR tablet 40 mEq  40 mEq Oral BID    sodium chloride (OCEAN) 0 65 % nasal spray 1 spray  1 spray Each Nare Q1H PRN         Physical Exam: /62   Pulse 79   Temp 98 °F (36 7 °C) (Oral)   Resp 18   Ht 6' 0 99" (1 854 m) Comment: (11/22/2021) Per Chart Review  Wt (!) 151 kg (332 lb 12 8 oz)   SpO2 94%   BMI 43 92 kg/m²     General Appearance:    Alert, cooperative, no distress, appears stated age   Head:    Normocephalic, no scleral icterus   Eyes:    PERRL   Nose:   Nares normal, septum midline, no drainage    Throat:   Lips, mucosa, and tongue normal   Neck:   Supple, symmetrical, trachea midline,              Lungs:     Prolonged exp phase to auscultation bilaterally, respirations unlabored        Heart:    Regular rate and rhythm, S1 and S2 normal, no murmur, rub   or gallop       Extremities:   Extremities normal, atraumatic, no cyanosis , no pitting edema       Skin:   Skin warm   Neurologic:   Alert and oriented to person place and time, no focal deficits                 Lab Results:   Recent Results (from the past 72 hour(s))   Fingerstick Glucose (POCT)    Collection Time: 12/27/21 10:30 AM   Result Value Ref Range    POC Glucose 220 (H) 65 - 140 mg/dl   Fingerstick Glucose (POCT)    Collection Time: 12/27/21  4:00 PM   Result Value Ref Range    POC Glucose 237 (H) 65 - 140 mg/dl   Fingerstick Glucose (POCT)    Collection Time: 12/27/21  8:53 PM   Result Value Ref Range    POC Glucose 186 (H) 65 - 140 mg/dl   Fingerstick Glucose (POCT)    Collection Time: 12/28/21  6:07 AM   Result Value Ref Range    POC Glucose 189 (H) 65 - 140 mg/dl   Basic metabolic panel    Collection Time: 12/28/21  6:08 AM   Result Value Ref Range    Sodium 136 136 - 145 mmol/L    Potassium 3 6 3 5 - 5 3 mmol/L    Chloride 100 100 - 108 mmol/L    CO2 30 21 - 32 mmol/L    ANION GAP 6 4 - 13 mmol/L    BUN 38 (H) 5 - 25 mg/dL    Creatinine 1 43 (H) 0 60 - 1 30 mg/dL    Glucose 189 (H) 65 - 140 mg/dL    Calcium 8 4 8 3 - 10 1 mg/dL    eGFR 55 ml/min/1 73sq m   Fingerstick Glucose (POCT)    Collection Time: 12/28/21 10:57 AM   Result Value Ref Range    POC Glucose 254 (H) 65 - 140 mg/dl   Fingerstick Glucose (POCT)    Collection Time: 12/28/21  4:26 PM   Result Value Ref Range    POC Glucose 162 (H) 65 - 140 mg/dl   Fingerstick Glucose (POCT)    Collection Time: 12/28/21  9:02 PM   Result Value Ref Range    POC Glucose 146 (H) 65 - 140 mg/dl   Fingerstick Glucose (POCT)    Collection Time: 12/29/21  6:02 AM   Result Value Ref Range    POC Glucose 105 65 - 140 mg/dl   Basic metabolic panel    Collection Time: 12/29/21 10:57 AM   Result Value Ref Range    Sodium 134 (L) 136 - 145 mmol/L    Potassium 4 3 3 5 - 5 3 mmol/L    Chloride 98 (L) 100 - 108 mmol/L    CO2 30 21 - 32 mmol/L    ANION GAP 6 4 - 13 mmol/L    BUN 39 (H) 5 - 25 mg/dL    Creatinine 1 51 (H) 0 60 - 1 30 mg/dL    Glucose 213 (H) 65 - 140 mg/dL    Calcium 9 0 8 3 - 10 1 mg/dL    eGFR 51 ml/min/1 73sq m   Fingerstick Glucose (POCT)    Collection Time: 12/29/21 11:10 AM   Result Value Ref Range    POC Glucose 209 (H) 65 - 140 mg/dl   Fingerstick Glucose (POCT)    Collection Time: 12/29/21  4:05 PM   Result Value Ref Range    POC Glucose 175 (H) 65 - 140 mg/dl   Fingerstick Glucose (POCT)    Collection Time: 12/29/21  8:51 PM   Result Value Ref Range    POC Glucose 97 65 - 140 mg/dl   Basic metabolic panel    Collection Time: 12/30/21  5:22 AM   Result Value Ref Range    Sodium 135 (L) 136 - 145 mmol/L    Potassium 3 7 3 5 - 5 3 mmol/L Chloride 99 (L) 100 - 108 mmol/L    CO2 31 21 - 32 mmol/L    ANION GAP 5 4 - 13 mmol/L    BUN 42 (H) 5 - 25 mg/dL    Creatinine 1 59 (H) 0 60 - 1 30 mg/dL    Glucose 106 65 - 140 mg/dL    Calcium 8 8 8 3 - 10 1 mg/dL    eGFR 48 ml/min/1 73sq m   Fingerstick Glucose (POCT)    Collection Time: 12/30/21  5:58 AM   Result Value Ref Range    POC Glucose 107 65 - 140 mg/dl     Imaging: I have personally reviewed pertinent reports  Tele -nsr pvc's,     Left Ventricle Left ventricular cavity size is mildly dilated (6 2 cm)  Wall thickness is mildly increased  The left ventricular ejection fraction is 55%  Systolic function is normal   Diastolic function is mildly abnormal, consistent with grade I (abnormal) relaxation  Wall Scoring Baseline     The following segments are hypokinetic: basal inferior and basal inferolateral   All other segments are normal             Right Ventricle Right ventricular cavity size is normal  Systolic function is normal  Wall thickness is normal    Left Atrium The atrium is normal in size  Right Atrium The atrium is normal in size  Aortic Valve The aortic valve is trileaflet  The leaflets are not thickened  The leaflets are not calcified  The leaflets exhibit normal mobility  There is no evidence of regurgitation  There is no evidence of stenosis  AV peak gradient is 7 mmHg  Mitral Valve The leaflets are not thickened  The leaflets are not calcified  The leaflets exhibit normal mobility  There is mild annular calcification  There is no evidence of regurgitation  There is no evidence of stenosis  The valve has normal function  Tricuspid Valve Tricuspid valve structure is normal  There is trace regurgitation  There is no evidence of stenosis  Pulmonic Valve Pulmonic valve structure is normal  There is no evidence of regurgitation  There is no evidence of stenosis  Ascending Aorta The aortic root is normal in size  IVC/SVC The inferior vena cava size is 20 0 mm   The inferior vena cava is normal in size  Respirophasic changes were blunted (less than 50% variation)  Pericardium There is no pericardial effusion  The pericardium is normal in appearance  Counseling / Coordination of Care  Total time spent today 25 minutes  Greater than 50% of total time was spent with the patient and / or family counseling and / or coordination of care

## 2021-12-31 LAB
ANION GAP SERPL CALCULATED.3IONS-SCNC: 4 MMOL/L (ref 4–13)
BUN SERPL-MCNC: 41 MG/DL (ref 5–25)
CALCIUM SERPL-MCNC: 9.4 MG/DL (ref 8.3–10.1)
CHLORIDE SERPL-SCNC: 96 MMOL/L (ref 100–108)
CO2 SERPL-SCNC: 34 MMOL/L (ref 21–32)
CREAT SERPL-MCNC: 1.67 MG/DL (ref 0.6–1.3)
GFR SERPL CREATININE-BSD FRML MDRD: 45 ML/MIN/1.73SQ M
GLUCOSE SERPL-MCNC: 101 MG/DL (ref 65–140)
GLUCOSE SERPL-MCNC: 123 MG/DL (ref 65–140)
GLUCOSE SERPL-MCNC: 125 MG/DL (ref 65–140)
GLUCOSE SERPL-MCNC: 134 MG/DL (ref 65–140)
GLUCOSE SERPL-MCNC: 97 MG/DL (ref 65–140)
POTASSIUM SERPL-SCNC: 4.1 MMOL/L (ref 3.5–5.3)
SODIUM SERPL-SCNC: 134 MMOL/L (ref 136–145)

## 2021-12-31 PROCEDURE — 99232 SBSQ HOSP IP/OBS MODERATE 35: CPT | Performed by: NURSE PRACTITIONER

## 2021-12-31 PROCEDURE — 94640 AIRWAY INHALATION TREATMENT: CPT

## 2021-12-31 PROCEDURE — 94760 N-INVAS EAR/PLS OXIMETRY 1: CPT

## 2021-12-31 PROCEDURE — 80048 BASIC METABOLIC PNL TOTAL CA: CPT | Performed by: PHYSICIAN ASSISTANT

## 2021-12-31 PROCEDURE — 99232 SBSQ HOSP IP/OBS MODERATE 35: CPT | Performed by: INTERNAL MEDICINE

## 2021-12-31 PROCEDURE — 82948 REAGENT STRIP/BLOOD GLUCOSE: CPT

## 2021-12-31 RX ORDER — TORSEMIDE 20 MG/1
20 TABLET ORAL 2 TIMES DAILY
Status: DISCONTINUED | OUTPATIENT
Start: 2022-01-01 | End: 2022-01-01 | Stop reason: HOSPADM

## 2021-12-31 RX ORDER — INSULIN GLARGINE 100 [IU]/ML
20 INJECTION, SOLUTION SUBCUTANEOUS
Status: DISCONTINUED | OUTPATIENT
Start: 2021-12-31 | End: 2022-01-01 | Stop reason: HOSPADM

## 2021-12-31 RX ADMIN — IPRATROPIUM BROMIDE 0.5 MG: 0.5 SOLUTION RESPIRATORY (INHALATION) at 08:11

## 2021-12-31 RX ADMIN — GUAIFENESIN 600 MG: 600 TABLET, EXTENDED RELEASE ORAL at 08:50

## 2021-12-31 RX ADMIN — HEPARIN SODIUM 7500 UNITS: 5000 INJECTION INTRAVENOUS; SUBCUTANEOUS at 22:06

## 2021-12-31 RX ADMIN — HEPARIN SODIUM 7500 UNITS: 5000 INJECTION INTRAVENOUS; SUBCUTANEOUS at 06:44

## 2021-12-31 RX ADMIN — INSULIN GLARGINE 20 UNITS: 100 INJECTION, SOLUTION SUBCUTANEOUS at 22:06

## 2021-12-31 RX ADMIN — GUAIFENESIN 600 MG: 600 TABLET, EXTENDED RELEASE ORAL at 17:50

## 2021-12-31 RX ADMIN — MONTELUKAST 10 MG: 10 TABLET, FILM COATED ORAL at 22:06

## 2021-12-31 RX ADMIN — ATORVASTATIN CALCIUM 10 MG: 10 TABLET, FILM COATED ORAL at 17:50

## 2021-12-31 RX ADMIN — LEVALBUTEROL HYDROCHLORIDE 1.25 MG: 1.25 SOLUTION, CONCENTRATE RESPIRATORY (INHALATION) at 20:00

## 2021-12-31 RX ADMIN — IPRATROPIUM BROMIDE 0.5 MG: 0.5 SOLUTION RESPIRATORY (INHALATION) at 13:30

## 2021-12-31 RX ADMIN — BUDESONIDE AND FORMOTEROL FUMARATE DIHYDRATE 2 PUFF: 160; 4.5 AEROSOL RESPIRATORY (INHALATION) at 08:50

## 2021-12-31 RX ADMIN — BUDESONIDE AND FORMOTEROL FUMARATE DIHYDRATE 2 PUFF: 160; 4.5 AEROSOL RESPIRATORY (INHALATION) at 17:53

## 2021-12-31 RX ADMIN — POTASSIUM CHLORIDE 40 MEQ: 1500 TABLET, EXTENDED RELEASE ORAL at 17:50

## 2021-12-31 RX ADMIN — ACETAMINOPHEN 650 MG: 325 TABLET, FILM COATED ORAL at 13:45

## 2021-12-31 RX ADMIN — BUMETANIDE 2 MG: 0.25 INJECTION INTRAMUSCULAR; INTRAVENOUS at 08:50

## 2021-12-31 RX ADMIN — LEVALBUTEROL HYDROCHLORIDE 1.25 MG: 1.25 SOLUTION, CONCENTRATE RESPIRATORY (INHALATION) at 13:30

## 2021-12-31 RX ADMIN — POTASSIUM CHLORIDE 40 MEQ: 1500 TABLET, EXTENDED RELEASE ORAL at 08:49

## 2021-12-31 RX ADMIN — LEVALBUTEROL HYDROCHLORIDE 1.25 MG: 1.25 SOLUTION, CONCENTRATE RESPIRATORY (INHALATION) at 08:11

## 2021-12-31 RX ADMIN — IPRATROPIUM BROMIDE 0.5 MG: 0.5 SOLUTION RESPIRATORY (INHALATION) at 20:00

## 2021-12-31 NOTE — ASSESSMENT & PLAN NOTE
Lab Results   Component Value Date    EGFR 45 12/31/2021    EGFR 48 12/30/2021    EGFR 51 12/29/2021    CREATININE 1 67 (H) 12/31/2021    CREATININE 1 59 (H) 12/30/2021    CREATININE 1 51 (H) 12/29/2021   ·     Monitor kidney function closely  Avoid nephrotoxins

## 2021-12-31 NOTE — ASSESSMENT & PLAN NOTE
Lab Results   Component Value Date    HGBA1C 7 0 (H) 11/10/2021       Recent Labs     12/30/21  2110 12/31/21  0641 12/31/21  1103 12/31/21  1244   POCGLU 130 123 134 101       Blood Sugar Average: Last 72 hrs:    Continue Lantus HS, Humalog t i d   With meals  Titrate insulin dose based on Accu-Cheks  Avoid hypoglycemia  Hypoglycemia protocol in place

## 2021-12-31 NOTE — ASSESSMENT & PLAN NOTE
Wt Readings from Last 3 Encounters:   12/31/21 (!) 146 kg (320 lb 12 8 oz)   12/16/21 (!) 154 kg (339 lb)   11/22/21 (!) 158 kg (348 lb)     Received IV Bumex  Now transitioned to p o   Torsemide  Less than 2 g salt diet  Fluid restriction  Cardiology following

## 2021-12-31 NOTE — PROGRESS NOTES
[facility]  Progress Note - Nessa Mcgraw 1965, 49 y o  male MRN: [id number]  Unit/Bed#: CW2 218-01 Encounter: [id number]  Primary Care Provider: Adele Murray MD   Date and time admitted to hospital: 12/23/2021  6:07 AM    * Acute on chronic diastolic heart failure [facility])  Assessment & Plan  Wt Readings from Last 3 Encounters:   12/31/21 (!) 146 kg (320 lb 12 8 oz)   12/16/21 (!) 154 kg (339 lb)   11/22/21 (!) 158 kg (348 lb)     Received IV Bumex  Now transitioned to p o  Torsemide  Less than 2 g salt diet  Fluid restriction  Cardiology following    Stage 3 chronic kidney disease [facility])  Assessment & Plan  Lab Results   Component Value Date    EGFR 45 12/31/2021    EGFR 48 12/30/2021    EGFR 51 12/29/2021    CREATININE 1 67 (H) 12/31/2021    CREATININE 1 59 (H) 12/30/2021    CREATININE 1 51 (H) 12/29/2021   ·     Monitor kidney function closely  Avoid nephrotoxins    Moderate asthma  Assessment & Plan  · Continue respiratory treatments  · Pulmonary input noted    VICKY (obstructive sleep apnea)  Assessment & Plan  · Patient cannot tolerate hospital CPAP  · Continue home CPAP as outpatient     Morbid obesity ([address] )  Assessment & Plan  · Dietary, lifestyle modifications encouraged    Type 2 diabetes mellitus with hyperglycemia [facility])  Assessment & Plan  Lab Results   Component Value Date    HGBA1C 7 0 (H) 11/10/2021       Recent Labs     12/30/21  2110 12/31/21  0641 12/31/21  1103 12/31/21  1244   POCGLU 130 123 134 101       Blood Sugar Average: Last 72 hrs:    Continue Lantus HS, Humalog t i d  With meals  Titrate insulin dose based on Accu-Cheks  Avoid hypoglycemia  Hypoglycemia protocol in place    Essential hypertension  Assessment & Plan  · Monitor blood pressures  · Avoid hypotension            VTE Pharmacologic Prophylaxis: VTE Score: 4 Moderate Risk (Score 3-4) - Pharmacological DVT Prophylaxis Ordered: heparin      Patient Centered Rounds: I performed bedside rounds with nursing staff today  Discussions with Specialists or Other Care Team Provider:     Education and Discussions with Family / Patient: Discussed with the patient, reports he is keeping his family updated  Time Spent for Care: 30 minutes  More than 50% of total time spent on counseling and coordination of care as described above  Current Length of Stay: 8 day(s)  Current Patient Status: Inpatient   Certification Statement: The patient will continue to require additional inpatient hospital stay due to As outlined  Discharge Plan: Possible discharge 24 to 48 hours    Code Status: Level 1 - Full Code    Subjective:     Sitting up in bed  Reports improving dyspnea  Cough nonproductive sputum  Encouraged out of bed and ambulation as able  Encourage incentive spirometry  History chart labs medications reviewed    Objective:     Vitals:   Temp (24hrs), Av 7 °F (36 5 °C), Min:97 3 °F (36 3 °C), Max:98 °F (36 7 °C)    Temp:  [97 3 °F (36 3 °C)-98 °F (36 7 °C)] 97 3 °F (36 3 °C)  HR:  [79-85] 85  Resp:  [17-19] 17  BP: (108-141)/(63-82) 124/82  SpO2:  [91 %-96 %] 96 %  Body mass index is 42 33 kg/m²  Input and Output Summary (last 24 hours):      Intake/Output Summary (Last 24 hours) at 2021 1346  Last data filed at 2021 1221  Gross per 24 hour   Intake 980 ml   Output 2900 ml   Net -1920 ml       Physical Exam:   Physical Exam     Comfortably sitting up in bed  Obese  Short thick neck  Lungs diminished breath sounds bilaterally  Heart sounds S1-S2 noted  Abdomen soft  Awake obeys simple commands  Pedal edema improving  No rash    Additional Data:     Labs:  Results from last 7 days   Lab Units 21  0545 21  0438 21  0438   WBC Thousand/uL 9 84   < > 10 91*   HEMOGLOBIN g/dL 12 6   < > 11 9*   HEMATOCRIT % 39 0   < > 37 1   PLATELETS Thousands/uL 204   < > 197   NEUTROS PCT %  --   --  89*   LYMPHS PCT %  --   --  6*   MONOS PCT %  --   --  4   EOS PCT %  --   --  0    < > = values in this interval not displayed  Results from last 7 days   Lab Units 12/31/21  0641   SODIUM mmol/L 134*   POTASSIUM mmol/L 4 1   CHLORIDE mmol/L 96*   CO2 mmol/L 34*   BUN mg/dL 41*   CREATININE mg/dL 1 67*   ANION GAP mmol/L 4   CALCIUM mg/dL 9 4   GLUCOSE RANDOM mg/dL 125         Results from last 7 days   Lab Units 12/31/21  1244 12/31/21  1103 12/31/21  0641 12/30/21  2110 12/30/21  1623 12/30/21  1113 12/30/21  0558 12/29/21  2051 12/29/21  1605 12/29/21  1110 12/29/21  0602 12/28/21  2102   POC GLUCOSE mg/dl 101 134 123 130 126 74 107 97 175* 209* 105 146*               Lines/Drains:  Invasive Devices  Report    Peripheral Intravenous Line            Peripheral IV 12/27/21 Dorsal (posterior); Right Forearm 3 days                      Imaging: Reviewed radiology reports from this admission including: chest xray    Recent Cultures (last 7 days):         Last 24 Hours Medication List:   Current Facility-Administered Medications   Medication Dose Route Frequency Provider Last Rate    acetaminophen  650 mg Oral Q6H PRN Brie Van MD      atorvastatin  10 mg Oral Daily With Bruce Evans MD      budesonide-formoterol  2 puff Inhalation BID Brie Van MD      guaiFENesin  600 mg Oral BID Brie Van MD      heparin (porcine)  7,500 Units Subcutaneous Q8H [address] Brie Van MD      hydrALAZINE  5 mg Intravenous Q6H PRN Reginald Dickerson MD      insulin glargine  20 Units Subcutaneous HS Sesar Bruno MD      insulin lispro  2-12 Units Subcutaneous 4x Daily (with meals and at bedtime) Yolis Sher PA-C      insulin lispro  7 Units Subcutaneous TID With Meals Sesar Bruno MD      ipratropium  0 5 mg Nebulization TID Brie Van MD      levalbuterol  1 25 mg Nebulization TID Brie Van MD      montelukast  10 mg Oral HS Brie Van MD      potassium chloride  40 mEq Oral BID RODRIGUE Ortiz      sodium chloride  1 spray Each Nare Q1H PRN Juan Moreno MD      [START ON 1/1/2022] torsemide  20 mg Oral BID Freddy Muhammad MD          Today, Patient Was Seen By: Vilma Farmer MD    **Please Note: This note may have been constructed using a voice recognition system  **

## 2021-12-31 NOTE — PROGRESS NOTES
Cardiology Progress Note - Manish Shafer 49 y o  male MRN: [id number]    Unit/Bed#: CW2 218-01 Encounter: [id number]        Subjective:    No significant events overnight  No chest pain  Dyspnea has improved  Review of Systems   Constitutional: Positive for malaise/fatigue  Cardiovascular: Negative for chest pain  Respiratory: Positive for shortness of breath  Objective:   Vitals: Blood pressure 124/82, pulse 85, temperature (!) 97 3 °F (36 3 °C), temperature source Oral, resp  rate 17, height 6' 0 99" (1 854 m), weight (!) 146 kg (320 lb 12 8 oz), SpO2 95 %  , Body mass index is 42 33 kg/m² ,   Orthostatic Blood Pressures      Most Recent Value   Blood Pressure 124/82 filed at 12/31/2021 0753   Patient Position - Orthostatic VS Lying filed at 12/31/2021 1389         Systolic (18ZKM), IVK:966 , Min:108 , BUQ:369     Diastolic (92QWG), QPK:14, Min:63, Max:82      Intake/Output Summary (Last 24 hours) at 12/31/2021 1156  Last data filed at 12/31/2021 1109  Gross per 24 hour   Intake 1000 ml   Output 3550 ml   Net -2550 ml     Weight (last 2 days)     Date/Time Weight    12/31/21 0645 146 (320 8)    12/31/21 0600 146 (320 8)    12/30/21 0730 148 (325 62)    12/29/21 0626 151 (332 8)                Physical Exam  Vitals reviewed  Constitutional:       Appearance: Normal appearance  HENT:      Head: Normocephalic  Nose: Nose normal       Mouth/Throat:      Mouth: Mucous membranes are moist       Pharynx: Oropharynx is clear  Eyes:      General: No scleral icterus  Conjunctiva/sclera: Conjunctivae normal    Cardiovascular:      Rate and Rhythm: Normal rate and regular rhythm  Heart sounds: No murmur heard  No friction rub  No gallop  Pulmonary:      Effort: Pulmonary effort is normal  No respiratory distress  Breath sounds: Normal breath sounds  No wheezing or rales  Abdominal:      General: Abdomen is flat  Bowel sounds are normal  There is no distension        Palpations: Abdomen is soft  Tenderness: There is no abdominal tenderness  There is no guarding  Musculoskeletal:      Cervical back: Normal range of motion and neck supple  Right lower leg: No edema  Left lower leg: No edema  Skin:     General: Skin is warm and dry  Neurological:      General: No focal deficit present  Mental Status: He is alert and oriented to person, place, and time  Psychiatric:         Mood and Affect: Mood normal          Behavior: Behavior normal            Laboratory Results:        CBC with diff:   Results from last 7 days   Lab Units 12/27/21  0545 12/25/21  0438   WBC Thousand/uL 9 84 10 91*   HEMOGLOBIN g/dL 12 6 11 9*   HEMATOCRIT % 39 0 37 1   MCV fL 94 94   PLATELETS Thousands/uL 204 197   MCH pg 30 3 30 1   MCHC g/dL 32 3 32 1   RDW % 13 5 13 3   MPV fL 11 0 10 4   NRBC AUTO /100 WBCs  --  0         CMP:  Results from last 7 days   Lab Units 12/31/21  0641 12/30/21  0522 12/29/21  1057 12/28/21  0608 12/27/21  0545 12/26/21  0639 12/25/21  0438   POTASSIUM mmol/L 4 1 3 7 4 3 3 6 3 5 3 5 3 9   CHLORIDE mmol/L 96* 99* 98* 100 100 97* 97*   CO2 mmol/L 34* 31 30 30 31 32 32   BUN mg/dL 41* 42* 39* 38* 34* 36* 33*   CREATININE mg/dL 1 67* 1 59* 1 51* 1 43* 1 48* 1 71* 1 59*   CALCIUM mg/dL 9 4 8 8 9 0 8 4 8 0* 8 2* 8 0*   EGFR ml/min/1 73sq m 45 48 51 55 52 44 48         BMP:  Results from last 7 days   Lab Units 12/31/21  0641 12/30/21  0522 12/29/21  1057 12/28/21  0608 12/27/21  0545 12/26/21  0639 12/25/21  0438   POTASSIUM mmol/L 4 1 3 7 4 3 3 6 3 5 3 5 3 9   CHLORIDE mmol/L 96* 99* 98* 100 100 97* 97*   CO2 mmol/L 34* 31 30 30 31 32 32   BUN mg/dL 41* 42* 39* 38* 34* 36* 33*   CREATININE mg/dL 1 67* 1 59* 1 51* 1 43* 1 48* 1 71* 1 59*   CALCIUM mg/dL 9 4 8 8 9 0 8 4 8 0* 8 2* 8 0*       BNP: No results for input(s): BNP in the last 72 hours      Magnesium:   Results from last 7 days   Lab Units 12/26/21  0639   MAGNESIUM mg/dL 2 4       Coags:       TSH: No results found for: TSH    Hemoglobin A1C       Lipid Profile:       Cardiac testing:   Results for orders placed during the hospital encounter of 21    Echo complete with contrast if indicated    Narrative  [address]  [place], [address] [facility]  [phone]    Transthoracic Echocardiogram  2D, M-mode, Doppler, and Color Doppler    Study date:  2021    Patient: Zaina Turk  MR number: [id number]  Account number: [de-identified]  : 1965  Age: 49 years  Gender: Male  Status: Inpatient  Location: Bedside  Height: 73 in  Weight: 359 3 lb  BP: 177/ 91 mmHg    Indications: Assess congestive heart failure  Diagnoses: I42 9 - Cardiomyopathy, unspecified    Sonographer:  TOSHIA Guajardo  Primary Physician:  Derek Valenzuela MD  Group:  [address] [place] [address] [facility]  Cardiology Fellow:  Geraldo Humphreys  Interpreting Physician:  Finn Palafox MD    SUMMARY    LEFT VENTRICLE:  Size was at the upper limits of normal   Systolic function was at the lower limits of normal  Ejection fraction was estimated to be 50 %  There were no regional wall motion abnormalities  There was akinesis of the basal inferior and basal-mid inferolateral wall(s)  There was no evidence of concentric hypertrophy  Doppler parameters were consistent with abnormal left ventricular relaxation (grade 1 diastolic dysfunction)  LEFT ATRIUM:  The atrium was mildly dilated  MITRAL VALVE:  There was mild annular calcification  There was trace regurgitation  TRICUSPID VALVE:  There was trace regurgitation  IVC, HEPATIC VEINS:  The inferior vena cava was mildly dilated  PROCEDURE: The procedure was performed at the bedside  This was a routine study  The transthoracic approach was used  The study included complete 2D imaging, M-mode, complete spectral Doppler, and color Doppler  The heart rate was 87 bpm,  at the start of the study   Images were obtained from the parasternal, apical, subcostal, and suprasternal notch acoustic windows  Echocardiographic views were limited due to restricted patient mobility, decreased penetration, and lung  interference  This was a technically difficult study  LEFT VENTRICLE: Size was at the upper limits of normal  Systolic function was at the lower limits of normal  Ejection fraction was estimated to be 50 %  There were no regional wall motion abnormalities  There was akinesis of the basal  inferior and basal-mid inferolateral wall(s)  Wall thickness was normal  There was no evidence of concentric hypertrophy  DOPPLER: Doppler parameters were consistent with abnormal left ventricular relaxation (grade 1 diastolic dysfunction)  RIGHT VENTRICLE: The size was normal  Systolic function was normal  Wall thickness was normal     LEFT ATRIUM: The atrium was mildly dilated  RIGHT ATRIUM: Size was normal     MITRAL VALVE: There was mild annular calcification  Valve structure was normal  There was normal leaflet separation  DOPPLER: The transmitral velocity was within the normal range  There was no evidence for stenosis  There was trace  regurgitation  AORTIC VALVE: The valve was trileaflet  Leaflets exhibited mildly increased thickness, mild calcification, and normal cuspal separation  DOPPLER: Transaortic velocity was within the normal range  There was no evidence for stenosis  There  was no significant regurgitation  TRICUSPID VALVE: The valve structure was normal  There was normal leaflet separation  DOPPLER: The transtricuspid velocity was within the normal range  There was no evidence for stenosis  There was trace regurgitation  PULMONIC VALVE: Leaflets exhibited normal thickness, no calcification, and normal cuspal separation  DOPPLER: The transpulmonic velocity was within the normal range  There was no significant regurgitation  PERICARDIUM: There was no pericardial effusion   The pericardium was normal in appearance  AORTA: The root exhibited normal size  The ascending aorta was normal in size  SYSTEMIC VEINS: IVC: The inferior vena cava was mildly dilated  Respirophasic changes were normal     SYSTEM MEASUREMENT TABLES    2D  %FS: 21 53 %  Ao Diam: 3 19 cm  Ao asc: 3 32 cm  EDV(Teich): 188 84 ml  EF(Teich): 42 83 %  ESV(Teich): 107 95 ml  IVSd: 0 8 cm  LA Area: 19 94 cm2  LA Diam: 4 66 cm  LVEDV MOD A4C: 126 08 ml  LVEF MOD A4C: 46 23 %  LVESV MOD A4C: 67 8 ml  LVIDd: 6 13 cm  LVIDs: 4 81 cm  LVLd A4C: 8 23 cm  LVLs A4C: 7 32 cm  LVPWd: 0 78 cm  RA Area: 20 73 cm2  RVIDd: 3 74 cm  SV MOD A4C: 58 28 ml  SV(Teich): 80 89 ml    MM  TAPSE: 2 77 cm    PW  E' Sept: 0 08 m/s  E/E' Sept: 9 01  MV A Zach: 0 84 m/s  MV Dec Canadian: 3 3 m/s2  MV DecT: 228 2 ms  MV E Zach: 0 75 m/s  MV E/A Ratio: 0 89  MV PHT: 66 18 ms  MVA By PHT: 3 32 cm2    [address] [facility]redited [facility]    Prepared and electronically signed by    Belkis Foster MD  Signed 20-Jul-2021 07:13:15    No results found for this or any previous visit  No results found for this or any previous visit  No results found for this or any previous visit        Meds/Allergies   current meds:   Current Facility-Administered Medications   Medication Dose Route Frequency    acetaminophen (TYLENOL) tablet 650 mg  650 mg Oral Q6H PRN    atorvastatin (LIPITOR) tablet 10 mg  10 mg Oral Daily With Dinner    budesonide-formoterol (SYMBICORT) 160-4 5 mcg/act inhaler 2 puff  2 puff Inhalation BID    guaiFENesin (MUCINEX) 12 hr tablet 600 mg  600 mg Oral BID    heparin (porcine) subcutaneous injection 7,500 Units  7,500 Units Subcutaneous Q8H [address]    hydrALAZINE (APRESOLINE) injection 5 mg  5 mg Intravenous Q6H PRN    insulin glargine (LANTUS) subcutaneous injection 25 Units 0 25 mL  25 Units Subcutaneous HS    insulin lispro (HumaLOG) 100 units/mL subcutaneous injection 10 Units  10 Units Subcutaneous TID With Meals    insulin lispro (HumaLOG) 100 units/mL subcutaneous injection 2-12 Units  2-12 Units Subcutaneous 4x Daily (with meals and at bedtime)    ipratropium (ATROVENT) 0 02 % inhalation solution 0 5 mg  0 5 mg Nebulization TID    levalbuterol (XOPENEX) inhalation solution 1 25 mg  1 25 mg Nebulization TID    montelukast (SINGULAIR) tablet 10 mg  10 mg Oral HS    potassium chloride (K-DUR,KLOR-CON) CR tablet 40 mEq  40 mEq Oral BID    sodium chloride (OCEAN) 0 65 % nasal spray 1 spray  1 spray Each Nare Q1H PRN    [START ON 1/1/2022] torsemide (DEMADEX) tablet 20 mg  20 mg Oral BID     Medications Prior to Admission   Medication    albuterol (PROVENTIL HFA,VENTOLIN HFA) 90 mcg/act inhaler    atorvastatin (LIPITOR) 10 mg tablet    budesonide-formoterol (Symbicort) 160-4 5 mcg/act inhaler    guaiFENesin (MUCINEX) 600 mg 12 hr tablet    Januvia 100 MG tablet    levalbuterol (Xopenex) 1 25 mg/3 mL nebulizer solution    metFORMIN (GLUCOPHAGE) 500 mg tablet    MULTIPLE VITAMINS-CALCIUM PO    potassium chloride (K-DUR,KLOR-CON) 10 mEq tablet    sodium chloride (OCEAN) 0 65 % nasal spray    tiotropium (Spiriva Respimat) 1 25 MCG/ACT AERS inhaler    torsemide (DEMADEX) 20 mg tablet    Accu-Chek FastClix Lancets MISC    Blood Glucose Monitoring Suppl (Accu-Chek Guide) w/Device KIT    cephalexin (KEFLEX) 500 mg capsule    glucose blood (Accu-Chek Guide) test strip    montelukast (SINGULAIR) 10 mg tablet          Assessment:  Principal Problem:    Acute on chronic diastolic heart failure (HCC)  Active Problems:    Essential hypertension    Type 2 diabetes mellitus with hyperglycemia (HCC)    Morbid obesity (HCC)    VICKY (obstructive sleep apnea)    Moderate asthma    Stage 3 chronic kidney disease (HCC)    Plan:    Acute on Chronic Diastolic CHF; OKay to transition to PO diuretics  CKD 3 Cr 1 67 up from 1 59 yesterday  Type 2 DM    HTN BP Stable             Counseling / Coordination of Care  Total floor / unit time spent today 25 minutes  Greater than 50% of total time was spent with the patient and / or family counseling and / or coordination of care  A description of the counseling / coordination of care

## 2022-01-01 VITALS
RESPIRATION RATE: 18 BRPM | HEART RATE: 88 BPM | SYSTOLIC BLOOD PRESSURE: 117 MMHG | WEIGHT: 315 LBS | TEMPERATURE: 97.7 F | DIASTOLIC BLOOD PRESSURE: 88 MMHG | OXYGEN SATURATION: 97 % | BODY MASS INDEX: 41.75 KG/M2 | HEIGHT: 73 IN

## 2022-01-01 LAB
ANION GAP SERPL CALCULATED.3IONS-SCNC: 7 MMOL/L (ref 4–13)
BUN SERPL-MCNC: 44 MG/DL (ref 5–25)
CALCIUM SERPL-MCNC: 10.1 MG/DL (ref 8.3–10.1)
CHLORIDE SERPL-SCNC: 96 MMOL/L (ref 100–108)
CO2 SERPL-SCNC: 29 MMOL/L (ref 21–32)
CREAT SERPL-MCNC: 1.65 MG/DL (ref 0.6–1.3)
GFR SERPL CREATININE-BSD FRML MDRD: 46 ML/MIN/1.73SQ M
GLUCOSE SERPL-MCNC: 165 MG/DL (ref 65–140)
GLUCOSE SERPL-MCNC: 193 MG/DL (ref 65–140)
GLUCOSE SERPL-MCNC: 215 MG/DL (ref 65–140)
POTASSIUM SERPL-SCNC: 3.9 MMOL/L (ref 3.5–5.3)
SODIUM SERPL-SCNC: 132 MMOL/L (ref 136–145)

## 2022-01-01 PROCEDURE — 80048 BASIC METABOLIC PNL TOTAL CA: CPT | Performed by: INTERNAL MEDICINE

## 2022-01-01 PROCEDURE — 82948 REAGENT STRIP/BLOOD GLUCOSE: CPT

## 2022-01-01 PROCEDURE — 99238 HOSP IP/OBS DSCHRG MGMT 30/<: CPT | Performed by: FAMILY MEDICINE

## 2022-01-01 PROCEDURE — 94640 AIRWAY INHALATION TREATMENT: CPT

## 2022-01-01 RX ORDER — TORSEMIDE 20 MG/1
20 TABLET ORAL 2 TIMES DAILY
Qty: 60 TABLET | Refills: 0 | OUTPATIENT
Start: 2022-01-01 | End: 2022-02-12

## 2022-01-01 RX ADMIN — LEVALBUTEROL HYDROCHLORIDE 1.25 MG: 1.25 SOLUTION, CONCENTRATE RESPIRATORY (INHALATION) at 08:52

## 2022-01-01 RX ADMIN — POTASSIUM CHLORIDE 40 MEQ: 1500 TABLET, EXTENDED RELEASE ORAL at 08:52

## 2022-01-01 RX ADMIN — IPRATROPIUM BROMIDE 0.5 MG: 0.5 SOLUTION RESPIRATORY (INHALATION) at 08:52

## 2022-01-01 RX ADMIN — GUAIFENESIN 600 MG: 600 TABLET, EXTENDED RELEASE ORAL at 08:52

## 2022-01-01 RX ADMIN — TORSEMIDE 20 MG: 20 TABLET ORAL at 08:52

## 2022-01-01 RX ADMIN — HEPARIN SODIUM 7500 UNITS: 5000 INJECTION INTRAVENOUS; SUBCUTANEOUS at 06:23

## 2022-01-01 NOTE — PLAN OF CARE
Problem: Nutrition/Hydration-ADULT  Goal: Nutrient/Hydration intake appropriate for improving, restoring or maintaining nutritional needs  Description: Monitor and assess patient's nutrition/hydration status for malnutrition  Collaborate with interdisciplinary team and initiate plan and interventions as ordered  Monitor patient's weight and dietary intake as ordered or per policy  Utilize nutrition screening tool and intervene as necessary  Determine patient's food preferences and provide high-protein, high-caloric foods as appropriate       INTERVENTIONS:  - Monitor oral intake, urinary output, labs, and treatment plans  - Assess nutrition and hydration status and recommend course of action  - Evaluate amount of meals eaten  - Assist patient with eating if necessary   - Allow adequate time for meals  - Recommend/ encourage appropriate diets, oral nutritional supplements, and vitamin/mineral supplements  - Order, calculate, and assess calorie counts as needed  - Recommend, monitor, and adjust tube feedings and TPN/PPN based on assessed needs  - Assess need for intravenous fluids  - Provide specific nutrition/hydration education as appropriate  - Include patient/family/caregiver in decisions related to nutrition  Outcome: Progressing     Problem: PAIN - ADULT  Goal: Verbalizes/displays adequate comfort level or baseline comfort level  Description: Interventions:  - Encourage patient to monitor pain and request assistance  - Assess pain using appropriate pain scale  - Administer analgesics based on type and severity of pain and evaluate response  - Implement non-pharmacological measures as appropriate and evaluate response  - Consider cultural and social influences on pain and pain management  - Notify physician/advanced practitioner if interventions unsuccessful or patient reports new pain  Outcome: Progressing     Problem: INFECTION - ADULT  Goal: Absence of fever/infection during neutropenic period  Description: INTERVENTIONS:  - Monitor WBC    Outcome: Progressing     Problem: Knowledge Deficit  Goal: Patient/family/caregiver demonstrates understanding of disease process, treatment plan, medications, and discharge instructions  Description: Complete learning assessment and assess knowledge base    Interventions:  - Provide teaching at level of understanding  - Provide teaching via preferred learning methods  Outcome: Progressing food intake

## 2022-01-01 NOTE — ASSESSMENT & PLAN NOTE
Lab Results   Component Value Date    EGFR 46 01/01/2022    EGFR 45 12/31/2021    EGFR 48 12/30/2021    CREATININE 1 65 (H) 01/01/2022    CREATININE 1 67 (H) 12/31/2021    CREATININE 1 59 (H) 12/30/2021       Monitor kidney function closely  Avoid nephrotoxins

## 2022-01-01 NOTE — ASSESSMENT & PLAN NOTE
Lab Results   Component Value Date    HGBA1C 7 0 (H) 11/10/2021       Recent Labs     12/31/21  1103 12/31/21  1244 12/31/21  1611 01/01/22  0637   POCGLU 134 101 97 193*       Blood Sugar Average: Last 72 hrs:    Continue Lantus HS, Humalog t i d   With meals  Titrate insulin dose based on Accu-Cheks  Avoid hypoglycemia  Hypoglycemia protocol in place

## 2022-01-01 NOTE — ASSESSMENT & PLAN NOTE
Wt Readings from Last 3 Encounters:   01/01/22 (!) 145 kg (318 lb 12 8 oz)   12/16/21 (!) 154 kg (339 lb)   11/22/21 (!) 158 kg (348 lb)     Received IV Bumex  Now transitioned to p o   Torsemide  Less than 2 g salt diet  Fluid restriction  Cardiology following

## 2022-01-01 NOTE — NURSING NOTE
Pt reports he used to smoke an occasional cigar, but hasn't in a year  Pt refusing tobacco cessation education due to report that he doesn't smoke and doesn't need the education

## 2022-01-03 NOTE — UTILIZATION REVIEW
Nanda Thompson DO   Physician   Hospitalist   Discharge Summary      Signed   Date of Service:  1/1/2022 10:15 AM                 Signed                Show:Clear all  [x]Manual[x]Template[x]Copied    Added by:  [x]Aleksandra Bragg DO      []Fran for details    [address] [facility]  Discharge- Deepthi Garcia 1965, 49 y o  male MRN: [id number]  Unit/Bed#: CW2 218-01 Encounter: [id number]  Primary Care Provider: Carlito Elise MD   Date and time admitted to hospital: 12/23/2021  6:07 AM     Stage 3 chronic kidney disease ([facility] [address] )  Assessment & Plan      ·   Lab Results ·   Component · Value · Date ·     · EGFR · 46 · 01/01/2022 ·     · EGFR · 45 · 12/31/2021 ·     · EGFR · 48 · 12/30/2021 ·     · CREATININE · 1 65 (H) · 01/01/2022 ·     · CREATININE · 1 67 (H) · 12/31/2021 ·     · CREATININE · 1 59 (H) · 12/30/2021 ·         Monitor kidney function closely  Avoid nephrotoxins     Moderate asthma  Assessment & Plan  · Continue respiratory treatments  · Pulmonary input noted     VICKY (obstructive sleep apnea)  Assessment & Plan  · Patient cannot tolerate hospital CPAP  · Continue home CPAP as outpatient      Mor[facility] ([facility]  Assessment & Plan  · Dietary, lifestyle modifications encouraged     Type 2 diabetes mellitus with hyperglycemia [facility])  Assessment & Plan        Lab Results   Component Value Date     HGBA1C 7 0 (H) 11/10/2021                Recent Labs     12/31/21  1103 12/31/21  1244 12/31/21  1611 01/01/22  0637   POCGLU 134 101 97 193*         Blood Sugar Average: Last 72 hrs:     Continue Lantus HS, Humalog t i d   With meals  Titrate insulin dose based on Accu-Cheks  Avoid hypoglycemia  Hypoglycemia protocol in place     Essential hypertension  Assessment & Plan  · Monitor blood pressures  · Avoid hypotension     * Acute on chronic diastolic heart failure [facility])  Assessment & Plan      Wt Readings from Last 3 Encounters:   01/01/22 (!) 145 kg (318 lb 12 8 oz)   12/16/21 (!) 154 kg (339 lb)   11/22/21 (!) 158 kg (348 lb)      Received IV Bumex  Now transitioned to p o  Torsemide  Less than 2 g salt diet  Fluid restriction  Cardiology following               Medical Problems                        Resolved Problems  Date Reviewed: 1/1/2022           None                  Discharging Physician / Practitioner: Hanane Kelley DO  PCP: Elvin Quiros MD  Admission Date:   Admission Orders (From admission, onward)              Ordered          12/23/21 0821   Inpatient Admission  Once                          Discharge Date: 01/01/22     Consultations During Hospital Stay:  · Cardiology     Procedures Performed:   · None     Significant Findings / Test Results:   · None     Incidental Findings:   · None     Test Results Pending at Discharge (will require follow up): · None     Outpatient Tests Requested:  · None     Complications:  No     Reason for Admission:  Acute heart failure     Hospital Course:   Amanuel Pandya is a 49 y o  male patient who originally presented to the hospital on 12/23/2021 due to shortness of breath secondary to acute on chronic diastolic heart failure  Prior to admission cardiology increased his Lasix to 60 mg b i d  Without any improvement  Cardiology was consulted and he had IV Lasix from 12/23 until 12/31  On day of discharge 01/01 he was transition to p o  Torsemide which will be continued at discharge  He will follow-up with cardiology as an outpatient         Please see above list of diagnoses and related plan for additional information       Condition at Discharge: fair     Discharge Day Visit / Exam:   Subjective:  Patient reports that shortness of breath and lower extremity swelling is completely resolved    Vitals: Blood Pressure: 117/88 (01/01/22 0756)  Pulse: 88 (12/31/21 2100)  Temperature: 97 7 °F (36 5 °C) (01/01/22 0756)  Temp Source: Oral (12/31/21 0333)  Respirations: 18 (01/01/22 0756)  Height: 6' 0 99" (185 4 cm) ((11/22/2021) Per Chart Review) (12/23/21 1409)  Weight - Scale: (!) 145 kg (318 lb 12 8 oz) (01/01/22 0640)  SpO2: 97 % (01/01/22 0854)  Exam:   Physical Exam  Constitutional:       Appearance: Normal appearance  He is not diaphoretic  HENT:      Head: Normocephalic and atraumatic  Eyes:      Conjunctiva/sclera: Conjunctivae normal    Cardiovascular:      Rate and Rhythm: Normal rate and regular rhythm  Pulses: Normal pulses  Heart sounds: No murmur heard  Comments: No JVD  Pulmonary:      Effort: Pulmonary effort is normal  No respiratory distress  Breath sounds: Normal breath sounds  No wheezing or rales  Abdominal:      General: Bowel sounds are normal  There is no distension  Tenderness: There is no abdominal tenderness  Musculoskeletal:      Right lower leg: No edema  Left lower leg: No edema  Skin:     General: Skin is warm  Findings: No rash  Neurological:      General: No focal deficit present  Mental Status: He is alert and oriented to person, place, and time  Psychiatric:         Mood and Affect: Mood normal          Behavior: Behavior normal             Discussion with Family: Patient declined call to        Discharge instructions/Information to patient and family:   See after visit summary for information provided to patient and family        Provisions for Follow-Up Care:  See after visit summary for information related to follow-up care and any pertinent home health orders  Disposition:   Home     Planned Readmission:  No     Discharge Statement:  I spent 30 minutes discharging the patient  This time was spent on the day of discharge  I had direct contact with the patient on the day of discharge  Greater than 50% of the total time was spent examining patient, answering all patient questions, arranging and discussing plan of care with patient as well as directly providing post-discharge instructions    Additional time then spent on discharge activities      Discharge Medications:  See after visit summary for reconciled discharge medications provided to patient and/or family        **Please Note: This note may have been constructed using a voice recognition system**

## 2022-01-04 ENCOUNTER — TRANSITIONAL CARE MANAGEMENT (OUTPATIENT)
Dept: FAMILY MEDICINE CLINIC | Facility: CLINIC | Age: 57
End: 2022-01-04

## 2022-01-11 ENCOUNTER — OFFICE VISIT (OUTPATIENT)
Dept: FAMILY MEDICINE CLINIC | Facility: CLINIC | Age: 57
End: 2022-01-11
Payer: COMMERCIAL

## 2022-01-11 VITALS
RESPIRATION RATE: 20 BRPM | HEIGHT: 73 IN | WEIGHT: 315 LBS | DIASTOLIC BLOOD PRESSURE: 80 MMHG | SYSTOLIC BLOOD PRESSURE: 136 MMHG | TEMPERATURE: 98 F | HEART RATE: 100 BPM | BODY MASS INDEX: 41.75 KG/M2 | OXYGEN SATURATION: 97 %

## 2022-01-11 DIAGNOSIS — I50.33 ACUTE ON CHRONIC DIASTOLIC HEART FAILURE (HCC): Primary | ICD-10-CM

## 2022-01-11 DIAGNOSIS — J45.40 MODERATE PERSISTENT ASTHMA, UNSPECIFIED WHETHER COMPLICATED: ICD-10-CM

## 2022-01-11 DIAGNOSIS — E78.2 MIXED HYPERLIPIDEMIA: ICD-10-CM

## 2022-01-11 DIAGNOSIS — N18.31 STAGE 3A CHRONIC KIDNEY DISEASE (HCC): ICD-10-CM

## 2022-01-11 DIAGNOSIS — E11.65 TYPE 2 DIABETES MELLITUS WITH HYPERGLYCEMIA, WITHOUT LONG-TERM CURRENT USE OF INSULIN (HCC): ICD-10-CM

## 2022-01-11 DIAGNOSIS — E66.01 MORBID OBESITY (HCC): ICD-10-CM

## 2022-01-11 DIAGNOSIS — I10 ESSENTIAL HYPERTENSION: ICD-10-CM

## 2022-01-11 DIAGNOSIS — G47.33 OSA (OBSTRUCTIVE SLEEP APNEA): ICD-10-CM

## 2022-01-11 PROBLEM — N17.9 ACUTE RENAL FAILURE (ARF) (HCC): Status: RESOLVED | Noted: 2021-09-23 | Resolved: 2022-01-11

## 2022-01-11 PROCEDURE — 3008F BODY MASS INDEX DOCD: CPT | Performed by: NURSE PRACTITIONER

## 2022-01-11 PROCEDURE — 99495 TRANSJ CARE MGMT MOD F2F 14D: CPT | Performed by: FAMILY MEDICINE

## 2022-01-11 PROCEDURE — 1111F DSCHRG MED/CURRENT MED MERGE: CPT | Performed by: FAMILY MEDICINE

## 2022-01-11 NOTE — PROGRESS NOTES
Chief Complaint   Patient presents with    Transition of Care Management     Discharged 01/01/22  Health Maintenance   Topic Date Due    Pneumococcal Vaccine: Pediatrics (0 to 5 Years) and At-Risk Patients (6 to 59 Years) (1 of 2 - PPSV23) Never done    HIV Screening  Never done    Colorectal Cancer Screening  Never done    URINE MICROALBUMIN  04/23/2020    Influenza Vaccine (1) Never done    COVID-19 Vaccine (3 - Booster for Pfizer series) 12/05/2021    BMI: Followup Plan  05/14/2022    HEMOGLOBIN A1C  05/10/2022    Annual Physical  05/14/2022    Diabetic Foot Exam  10/13/2022    DM Eye Exam  11/04/2022    Depression Screening  11/16/2022    BMI: Adult  01/11/2023    DTaP,Tdap,and Td Vaccines (2 - Td or Tdap) 06/09/2028    Hepatitis C Screening  Completed    HIB Vaccine  Aged Out    Hepatitis B Vaccine  Aged Out    IPV Vaccine  Aged Out    Hepatitis A Vaccine  Aged Out    Meningococcal ACWY Vaccine  Aged Out    HPV Vaccine  Aged Out     BMI Counseling: Body mass index is 42 93 kg/m²  The BMI is above normal  Nutrition recommendations include decreasing portion sizes, encouraging healthy choices of fruits and vegetables, decreasing fast food intake, consuming healthier snacks, limiting drinks that contain sugar, moderation in carbohydrate intake, increasing intake of lean protein, reducing intake of saturated and trans fat and reducing intake of cholesterol  Exercise recommendations include exercising 3-5 times per week  No pharmacotherapy was ordered  Rationale for BMI follow-up plan is due to patient being overweight or obese  Assessment/Plan:     Patient presents for TCM visit  He was hospitalized at [facility] 12/23/21-1/1/2022 for acute on chronic CHF        Acute on chronic diastolic heart failure (HCC)  Wt Readings from Last 3 Encounters:   01/11/22 (!) 148 kg (325 lb 6 4 oz)   01/01/22 (!) 145 kg (318 lb 12 8 oz)   12/16/21 (!) 154 kg (339 lb)     Patient reports significant improvement symptoms  Continue Torsemide 20 mg 1 tablet twice daily  Follow a low-sodium diet, continue with fluid restriction  Check daily weights  Follow- up with cardiology tomorrow  Essential hypertension  BP at home ranges in 130's/80's  Continue Torsemide 20 mg 1 tablet twice daily  Follow a low-sodium diet, lose weight  Continue to monitor blood pressure at home  Follow up with cardiology tomorrow  Type 2 diabetes mellitus with hyperglycemia (HCC)    Lab Results   Component Value Date    HGBA1C 7 0 (H) 11/10/2021     Blood sugar at home ranges in 130's  Patient denies hypoglycemic episodes  Continue Metformin 500 mg 1 tablet twice daily with meals, Januvia 100 mg daily  Check eye exam by ophthalmology annually  Stage 3 chronic kidney disease [facility])  Lab Results   Component Value Date    EGFR 46 01/01/2022    EGFR 45 12/31/2021    EGFR 48 12/30/2021    CREATININE 1 65 (H) 01/01/2022    CREATININE 1 67 (H) 12/31/2021    CREATININE 1 59 (H) 12/30/2021     Creatinine 1 67, GFR 45  Advised to avoid NSAID's, nephrotoxins  Recommended nephrology evaluation  VICKY (obstructive sleep apnea)  Continue to use CPAP  Morbid obesity ([address] )  Encouraged to work on dietary and lifestyle modifications, lose weight  Mixed hyperlipidemia  Continue Atorvastatin 10 mg daily  Moderate asthma  Symptoms are stable  Continue Symbicort 160/4 5 mcg 2 puffs twice daily, Spiriva inhaler  Use Xopenex 1 25 mg via nebulizer 3 times daily PRN  Follow- up with pulmonology Dr Sherryle Brightly on 1/20/2022  HM: recommended to schedule COVID booster  Schedule follow-up visit in 3 months  Diagnoses and all orders for this visit:    Acute on chronic diastolic heart failure ([address] )    Essential hypertension    Type 2 diabetes mellitus with hyperglycemia, without long-term current use of insulin ([address] )  -     Ambulatory Referral to Nephrology;  Future    Stage 3a chronic kidney disease [facility])  -     Ambulatory Referral to Nephrology; Future    VICKY (obstructive sleep apnea)    Morbid obesity (HCC)    Mixed hyperlipidemia    Moderate persistent asthma, unspecified whether complicated         Subjective:     Patient ID: Andrea Jackson is a 49 y o  male  HPI      Patient presents for TCM visit  Patient was hospitalized at [facility] 12/23/21-1/1/2022 for acute on chronic CHF  Patient originally presented to the hospital due to worsening shortness of breath  Patient was seen in consultation by cardiology, was treated with IV Lasix  On the day of discharge on 1/1/2022 he was transitioned to Torsemide 20 mg twice daily  Reviewed hospital records, test results, discharge medications with patient  Patient reports improvement in shortness of breath, leg edema  He takes Torsemide 20 mg twice daily  No weight gain  Patient has appointment with cardiology tomorrow  Moderate persistent asthma - symptoms are stable  Patient will follow-up with pulmonology Dr Moriah Ca on January 20, 2022  Blood work done on December 31, 2021 showed creatinine 1 67, GFR 45, potassium 4 1, glucose 125  Echocardiogram done in November 2021 showed EF 83%, grade 1 diastolic dysfunction  Blood pressure remains stable  Patient denies chest pain, dizziness  Type 2 DM -blood sugar at home ranges in 130's  Reports no hypoglycemic episodes  Review of Systems   Constitutional: Negative for activity change, appetite change, chills, fatigue and fever  HENT: Negative for congestion, ear pain, hearing loss, sore throat and trouble swallowing  Eyes: Negative  Respiratory: Positive for cough (improved) and shortness of breath (improved)  Negative for chest tightness and wheezing  Cardiovascular: Positive for leg swelling (improved)  Negative for chest pain and palpitations  Gastrointestinal: Negative for abdominal pain, blood in stool, constipation, diarrhea, nausea and vomiting  Genitourinary: Positive for frequency  Negative for difficulty urinating, dysuria, flank pain and hematuria  Musculoskeletal: Negative for arthralgias, back pain, gait problem, myalgias and neck pain  Skin: Positive for rash (right knee)  Neurological: Negative for dizziness, syncope and headaches  Hematological: Negative  Psychiatric/Behavioral: Negative for dysphoric mood and sleep disturbance  The patient is not nervous/anxious  Objective:     Physical Exam  Vitals and nursing note reviewed  Constitutional:       Appearance: Normal appearance  Comments: Morbidly obese   HENT:      Head: Normocephalic and atraumatic  Eyes:      Conjunctiva/sclera: Conjunctivae normal       Pupils: Pupils are equal, round, and reactive to light  Neck:      Vascular: No carotid bruit  Cardiovascular:      Rate and Rhythm: Normal rate and regular rhythm  Heart sounds: No murmur heard  Pulmonary:      Effort: Pulmonary effort is normal       Breath sounds: Normal breath sounds  No wheezing  Abdominal:      General: There is no distension  Palpations: Abdomen is soft  Tenderness: There is no abdominal tenderness  Musculoskeletal:         General: No swelling, tenderness or deformity  Normal range of motion  Cervical back: Normal range of motion and neck supple  Right lower leg: No edema  Left lower leg: No edema  Skin:     General: Skin is warm and dry  Findings: Rash (psoriatic patch on right knee  ) present  Neurological:      Mental Status: He is alert        Motor: Weakness:      Psychiatric:         Mood and Affect: Mood normal            Vitals:    01/11/22 1427 01/11/22 1446   BP: 136/86 136/80   BP Location: Left arm Left arm   Patient Position: Sitting Sitting   Cuff Size: Large Large   Pulse: (!) 112 100   Resp: 20    Temp: 98 °F (36 7 °C)    TempSrc: Tympanic    SpO2: 97%    Weight: (!) 148 kg (325 lb 6 4 oz)    Height: 6' 1" (1 854 m) Transitional Care Management Review:  Allison Gibson is a 49 y o  male here for TCM follow up  During the TCM phone call patient stated:    TCM Call (since 12/11/2021)     Hospital care reviewed  Records reviewed        Patient was hospitialized at  [facility]        Date of Admission  12/23/21    Date of discharge  01/01/22    Diagnosis  Acute on chronic diastolic heart failure [facility])    Disposition  Home    Were the patients medications reviewed and updated  Yes    Current Symptoms  None      TCM Call (since 12/11/2021)     Should patient be enrolled in anticoag monitoring? No    Scheduled for follow up?   Yes    Did you obtain your prescribed medications  Yes    Do you need help managing your prescriptions or medications  No    Is transportation to your appointment needed  No    I have advised the patient to call PCP with any new or worsening symptoms  Shayla Moffett, MR    Living Arrangements  Family members    Support System  Family    The type of support provided  None    Do you have social support  Yes, as much as I need    Are you recieving any outpatient services  No    Are you recieving home care services  No    Are you using any community resources  No    Current waiver services  No    Have you fallen in the last 12 months  No    Interperter language line needed  No    Counseling  Patient          Maxime Najera MD

## 2022-01-12 ENCOUNTER — TELEPHONE (OUTPATIENT)
Dept: NEPHROLOGY | Facility: CLINIC | Age: 57
End: 2022-01-12

## 2022-01-12 NOTE — ASSESSMENT & PLAN NOTE
Lab Results   Component Value Date    EGFR 46 01/01/2022    EGFR 45 12/31/2021    EGFR 48 12/30/2021    CREATININE 1 65 (H) 01/01/2022    CREATININE 1 67 (H) 12/31/2021    CREATININE 1 59 (H) 12/30/2021     Creatinine 1 67, GFR 45  Advised to avoid NSAID's, nephrotoxins  Recommended nephrology evaluation

## 2022-01-12 NOTE — ASSESSMENT & PLAN NOTE
Symptoms are stable  Continue Symbicort 160/4 5 mcg 2 puffs twice daily, Spiriva inhaler  Use Xopenex 1 25 mg via nebulizer 3 times daily PRN  Follow- up with pulmonology Dr Abiel Campos on 1/20/2022

## 2022-01-12 NOTE — TELEPHONE ENCOUNTER
Left message with patient to call back and schedule a consult appointment  This is the first attempt   Please schedule from referral

## 2022-01-12 NOTE — ASSESSMENT & PLAN NOTE
Wt Readings from Last 3 Encounters:   01/11/22 (!) 148 kg (325 lb 6 4 oz)   01/01/22 (!) 145 kg (318 lb 12 8 oz)   12/16/21 (!) 154 kg (339 lb)     Patient reports significant improvement symptoms  Continue Torsemide 20 mg 1 tablet twice daily  Follow a low-sodium diet, continue with fluid restriction  Check daily weights  Follow- up with cardiology tomorrow

## 2022-01-12 NOTE — ASSESSMENT & PLAN NOTE
Lab Results   Component Value Date    HGBA1C 7 0 (H) 11/10/2021     Blood sugar at home ranges in 130's  Patient denies hypoglycemic episodes  Continue Metformin 500 mg 1 tablet twice daily with meals, Januvia 100 mg daily  Check eye exam by ophthalmology annually

## 2022-01-12 NOTE — ASSESSMENT & PLAN NOTE
BP at home ranges in 130's/80's  Continue Torsemide 20 mg 1 tablet twice daily  Follow a low-sodium diet, lose weight  Continue to monitor blood pressure at home  Follow up with cardiology tomorrow

## 2022-01-14 NOTE — TELEPHONE ENCOUNTER
Left message with patient to call back and schedule a consult appointment  This is the second attempt

## 2022-01-17 NOTE — TELEPHONE ENCOUNTER
Left message with patient to call back and schedule a consult appointment  This is the third attempt

## 2022-01-20 ENCOUNTER — TELEPHONE (OUTPATIENT)
Dept: PULMONOLOGY | Facility: CLINIC | Age: 57
End: 2022-01-20

## 2022-01-20 NOTE — TELEPHONE ENCOUNTER
Pt missed his appointment with Dr Emy Alex on 1/20/22  LM for Pt to reschedule his follow up at the [facility] office

## 2022-01-24 DIAGNOSIS — E11.65 TYPE 2 DIABETES MELLITUS WITH HYPERGLYCEMIA, WITHOUT LONG-TERM CURRENT USE OF INSULIN (HCC): ICD-10-CM

## 2022-01-24 RX ORDER — SITAGLIPTIN 100 MG/1
TABLET, FILM COATED ORAL
Qty: 30 TABLET | Refills: 1 | Status: SHIPPED | OUTPATIENT
Start: 2022-01-24 | End: 2022-02-28 | Stop reason: SDUPTHER

## 2022-02-03 DIAGNOSIS — E87.6 HYPOKALEMIA: ICD-10-CM

## 2022-02-03 DIAGNOSIS — J45.41 MODERATE PERSISTENT ASTHMA WITH ACUTE EXACERBATION: ICD-10-CM

## 2022-02-03 RX ORDER — MONTELUKAST SODIUM 10 MG/1
10 TABLET ORAL
Qty: 90 TABLET | Refills: 1 | Status: SHIPPED | OUTPATIENT
Start: 2022-02-03 | End: 2022-07-28

## 2022-02-03 RX ORDER — BUDESONIDE AND FORMOTEROL FUMARATE DIHYDRATE 160; 4.5 UG/1; UG/1
2 AEROSOL RESPIRATORY (INHALATION) 2 TIMES DAILY
Qty: 10.2 G | Refills: 2 | Status: SHIPPED | OUTPATIENT
Start: 2022-02-03 | End: 2022-07-14

## 2022-02-03 RX ORDER — TIOTROPIUM BROMIDE INHALATION SPRAY 1.56 UG/1
2 SPRAY, METERED RESPIRATORY (INHALATION) DAILY
Qty: 4 G | Refills: 3 | Status: SHIPPED | OUTPATIENT
Start: 2022-02-03

## 2022-02-03 RX ORDER — POTASSIUM CHLORIDE 750 MG/1
20 TABLET, EXTENDED RELEASE ORAL 2 TIMES DAILY
Qty: 360 TABLET | Refills: 1 | Status: SHIPPED | OUTPATIENT
Start: 2022-02-03 | End: 2022-03-22 | Stop reason: SDUPTHER

## 2022-02-12 ENCOUNTER — APPOINTMENT (EMERGENCY)
Dept: RADIOLOGY | Facility: HOSPITAL | Age: 57
End: 2022-02-12
Payer: COMMERCIAL

## 2022-02-12 ENCOUNTER — HOSPITAL ENCOUNTER (EMERGENCY)
Facility: HOSPITAL | Age: 57
Discharge: HOME | End: 2022-02-12
Attending: EMERGENCY MEDICINE | Admitting: EMERGENCY MEDICINE
Payer: COMMERCIAL

## 2022-02-12 VITALS
SYSTOLIC BLOOD PRESSURE: 109 MMHG | RESPIRATION RATE: 20 BRPM | WEIGHT: 315 LBS | OXYGEN SATURATION: 93 % | BODY MASS INDEX: 42.66 KG/M2 | DIASTOLIC BLOOD PRESSURE: 70 MMHG | TEMPERATURE: 98.1 F | HEIGHT: 72 IN | HEART RATE: 100 BPM

## 2022-02-12 DIAGNOSIS — I50.33 ACUTE ON CHRONIC DIASTOLIC HEART FAILURE (HCC): ICD-10-CM

## 2022-02-12 DIAGNOSIS — I48.91 A-FIB (HCC): Primary | ICD-10-CM

## 2022-02-12 DIAGNOSIS — I50.9 CHF (CONGESTIVE HEART FAILURE) (HCC): ICD-10-CM

## 2022-02-12 DIAGNOSIS — R06.02 SOB (SHORTNESS OF BREATH): ICD-10-CM

## 2022-02-12 LAB
2HR DELTA HS TROPONIN: 0 NG/L
ALBUMIN SERPL BCP-MCNC: 2.6 G/DL (ref 3.5–5)
ALP SERPL-CCNC: 129 U/L (ref 46–116)
ALT SERPL W P-5'-P-CCNC: 16 U/L (ref 12–78)
ANION GAP SERPL CALCULATED.3IONS-SCNC: 10 MMOL/L (ref 4–13)
AST SERPL W P-5'-P-CCNC: 16 U/L (ref 5–45)
ATRIAL RATE: 101 BPM
ATRIAL RATE: 141 BPM
BASOPHILS # BLD AUTO: 0.06 THOUSANDS/ΜL (ref 0–0.1)
BASOPHILS NFR BLD AUTO: 1 % (ref 0–1)
BILIRUB SERPL-MCNC: 2.02 MG/DL (ref 0.2–1)
BUN SERPL-MCNC: 10 MG/DL (ref 5–25)
CALCIUM ALBUM COR SERPL-MCNC: 8.3 MG/DL (ref 8.3–10.1)
CALCIUM SERPL-MCNC: 7.2 MG/DL (ref 8.3–10.1)
CARDIAC TROPONIN I PNL SERPL HS: 20 NG/L
CARDIAC TROPONIN I PNL SERPL HS: 20 NG/L
CHLORIDE SERPL-SCNC: 94 MMOL/L (ref 100–108)
CO2 SERPL-SCNC: 31 MMOL/L (ref 21–32)
CREAT SERPL-MCNC: 1.22 MG/DL (ref 0.6–1.3)
D DIMER PPP FEU-MCNC: 0.98 UG/ML FEU
EOSINOPHIL # BLD AUTO: 0.22 THOUSAND/ΜL (ref 0–0.61)
EOSINOPHIL NFR BLD AUTO: 2 % (ref 0–6)
ERYTHROCYTE [DISTWIDTH] IN BLOOD BY AUTOMATED COUNT: 13.9 % (ref 11.6–15.1)
GFR SERPL CREATININE-BSD FRML MDRD: 66 ML/MIN/1.73SQ M
GLUCOSE SERPL-MCNC: 142 MG/DL (ref 65–140)
HCT VFR BLD AUTO: 36.5 % (ref 36.5–49.3)
HGB BLD-MCNC: 12.4 G/DL (ref 12–17)
IMM GRANULOCYTES # BLD AUTO: 0.07 THOUSAND/UL (ref 0–0.2)
IMM GRANULOCYTES NFR BLD AUTO: 1 % (ref 0–2)
LYMPHOCYTES # BLD AUTO: 0.62 THOUSANDS/ΜL (ref 0.6–4.47)
LYMPHOCYTES NFR BLD AUTO: 6 % (ref 14–44)
MCH RBC QN AUTO: 29.7 PG (ref 26.8–34.3)
MCHC RBC AUTO-ENTMCNC: 34 G/DL (ref 31.4–37.4)
MCV RBC AUTO: 88 FL (ref 82–98)
MONOCYTES # BLD AUTO: 0.59 THOUSAND/ΜL (ref 0.17–1.22)
MONOCYTES NFR BLD AUTO: 6 % (ref 4–12)
NEUTROPHILS # BLD AUTO: 8.44 THOUSANDS/ΜL (ref 1.85–7.62)
NEUTS SEG NFR BLD AUTO: 84 % (ref 43–75)
NRBC BLD AUTO-RTO: 0 /100 WBCS
NT-PROBNP SERPL-MCNC: 631 PG/ML
P AXIS: 98 DEGREES
PLATELET # BLD AUTO: 224 THOUSANDS/UL (ref 149–390)
PMV BLD AUTO: 10 FL (ref 8.9–12.7)
POTASSIUM SERPL-SCNC: 3.2 MMOL/L (ref 3.5–5.3)
PROT SERPL-MCNC: 6.6 G/DL (ref 6.4–8.2)
QRS AXIS: 47 DEGREES
QRS AXIS: 52 DEGREES
QRSD INTERVAL: 74 MS
QRSD INTERVAL: 76 MS
QT INTERVAL: 316 MS
QT INTERVAL: 330 MS
QTC INTERVAL: 373 MS
QTC INTERVAL: 407 MS
RBC # BLD AUTO: 4.17 MILLION/UL (ref 3.88–5.62)
SODIUM SERPL-SCNC: 135 MMOL/L (ref 136–145)
T WAVE AXIS: 103 DEGREES
T WAVE AXIS: 108 DEGREES
VENTRICULAR RATE: 100 BPM
VENTRICULAR RATE: 77 BPM
WBC # BLD AUTO: 10 THOUSAND/UL (ref 4.31–10.16)

## 2022-02-12 PROCEDURE — 71046 X-RAY EXAM CHEST 2 VIEWS: CPT

## 2022-02-12 PROCEDURE — 99285 EMERGENCY DEPT VISIT HI MDM: CPT

## 2022-02-12 PROCEDURE — 93010 ELECTROCARDIOGRAM REPORT: CPT | Performed by: INTERNAL MEDICINE

## 2022-02-12 PROCEDURE — 36415 COLL VENOUS BLD VENIPUNCTURE: CPT

## 2022-02-12 PROCEDURE — 96374 THER/PROPH/DIAG INJ IV PUSH: CPT

## 2022-02-12 PROCEDURE — 93005 ELECTROCARDIOGRAM TRACING: CPT

## 2022-02-12 PROCEDURE — 94640 AIRWAY INHALATION TREATMENT: CPT

## 2022-02-12 PROCEDURE — 85379 FIBRIN DEGRADATION QUANT: CPT

## 2022-02-12 PROCEDURE — 85025 COMPLETE CBC W/AUTO DIFF WBC: CPT

## 2022-02-12 PROCEDURE — G1004 CDSM NDSC: HCPCS

## 2022-02-12 PROCEDURE — 80053 COMPREHEN METABOLIC PANEL: CPT

## 2022-02-12 PROCEDURE — 84484 ASSAY OF TROPONIN QUANT: CPT

## 2022-02-12 PROCEDURE — 71275 CT ANGIOGRAPHY CHEST: CPT

## 2022-02-12 PROCEDURE — 83880 ASSAY OF NATRIURETIC PEPTIDE: CPT

## 2022-02-12 RX ORDER — IPRATROPIUM BROMIDE AND ALBUTEROL SULFATE 2.5; .5 MG/3ML; MG/3ML
3 SOLUTION RESPIRATORY (INHALATION)
Status: DISCONTINUED | OUTPATIENT
Start: 2022-02-12 | End: 2022-02-12 | Stop reason: HOSPADM

## 2022-02-12 RX ORDER — METOPROLOL SUCCINATE 25 MG/1
50 TABLET, EXTENDED RELEASE ORAL DAILY
Qty: 20 TABLET | Refills: 0 | Status: SHIPPED | OUTPATIENT
Start: 2022-02-12 | End: 2022-03-10 | Stop reason: HOSPADM

## 2022-02-12 RX ORDER — FUROSEMIDE 10 MG/ML
80 INJECTION INTRAMUSCULAR; INTRAVENOUS ONCE
Status: COMPLETED | OUTPATIENT
Start: 2022-02-12 | End: 2022-02-12

## 2022-02-12 RX ORDER — METOPROLOL SUCCINATE 25 MG/1
50 TABLET, EXTENDED RELEASE ORAL DAILY
Qty: 20 TABLET | Refills: 0 | Status: SHIPPED | OUTPATIENT
Start: 2022-02-12 | End: 2022-02-12 | Stop reason: SDUPTHER

## 2022-02-12 RX ORDER — TORSEMIDE 20 MG/1
20 TABLET ORAL 2 TIMES DAILY
Qty: 60 TABLET | Refills: 0 | Status: SHIPPED | OUTPATIENT
Start: 2022-02-12 | End: 2022-03-10 | Stop reason: HOSPADM

## 2022-02-12 RX ORDER — METOPROLOL SUCCINATE 50 MG/1
50 TABLET, EXTENDED RELEASE ORAL DAILY
Status: DISCONTINUED | OUTPATIENT
Start: 2022-02-12 | End: 2022-02-12 | Stop reason: HOSPADM

## 2022-02-12 RX ORDER — TORSEMIDE 20 MG/1
20 TABLET ORAL 2 TIMES DAILY
Qty: 60 TABLET | Refills: 0 | Status: SHIPPED | OUTPATIENT
Start: 2022-02-12 | End: 2022-02-12 | Stop reason: SDUPTHER

## 2022-02-12 RX ADMIN — METOPROLOL SUCCINATE 50 MG: 50 TABLET, EXTENDED RELEASE ORAL at 13:07

## 2022-02-12 RX ADMIN — FUROSEMIDE 80 MG: 10 INJECTION, SOLUTION INTRAMUSCULAR; INTRAVENOUS at 11:06

## 2022-02-12 RX ADMIN — IPRATROPIUM BROMIDE AND ALBUTEROL SULFATE 3 ML: 2.5; .5 SOLUTION RESPIRATORY (INHALATION) at 13:21

## 2022-02-12 RX ADMIN — APIXABAN 5 MG: 5 TABLET, FILM COATED ORAL at 13:08

## 2022-02-12 RX ADMIN — IOHEXOL 100 ML: 350 INJECTION, SOLUTION INTRAVENOUS at 12:35

## 2022-02-12 RX ADMIN — IPRATROPIUM BROMIDE AND ALBUTEROL SULFATE 3 ML: 2.5; .5 SOLUTION RESPIRATORY (INHALATION) at 10:37

## 2022-02-12 NOTE — ED PROVIDER NOTES
History  Chief Complaint   Patient presents with    Shortness of Breath     Patient is a 65-year-old male coming in by way of EMS for complaint of worsening shortness of breath over the last week  Patient been hospitalized numerous times over the last year for CHF exacerbation  Patient states that he also has asthma, and diabetes that he takes medications for  Patient no acute distress at presentation, but SpO2 is approximately 94 and respiration is 20 as well as heart rate is 102  Patient states that his legs have been swelling more, so we times is taken extra doses diruetic, with no symptom improvement    Patient denies any history of AFib, chest pain, nausea, vomiting, lightheadedness, headache, or any other symptoms at this time other than the shortness of breath and worsening edema          History provided by:  Patient   used: No    Shortness of Breath  Severity:  Mild  Onset quality:  Gradual  Duration:  1 week  Timing:  Constant  Associated symptoms: no abdominal pain, no chest pain, no claudication, no cough, no ear pain, no fever, no headaches, no vomiting and no wheezing        Prior to Admission Medications   Prescriptions Last Dose Informant Patient Reported? Taking?    Accu-Chek FastClix Lancets MISC  Self No No   Sig: Test blood sugar twice daily   Blood Glucose Monitoring Suppl (Accu-Chek Guide) w/Device KIT  Self No No   Sig: Use 2 (two) times a day Test blood sugar twice daily   Januvia 100 MG tablet   No No   Sig: TAKE 1 TABLET BY MOUTH EVERY DAY   MULTIPLE VITAMINS-CALCIUM PO  Self Yes No   Sig: Take 1 tablet by mouth daily   albuterol (PROVENTIL HFA,VENTOLIN HFA) 90 mcg/act inhaler  Self No No   Sig: TAKE 2 PUFFS BY MOUTH EVERY 4 HOURS AS NEEDED FOR WHEEZE   atorvastatin (LIPITOR) 10 mg tablet  Self No No   Sig: TAKE 1 TABLET BY MOUTH EVERY DAY   budesonide-formoterol (Symbicort) 160-4 5 mcg/act inhaler   No No   Sig: Inhale 2 puffs 2 (two) times a day Rinse mouth after use    glucose blood (Accu-Chek Guide) test strip  Self No No   Sig: Test blood sugar twice daily   guaiFENesin (MUCINEX) 600 mg 12 hr tablet  Self No No   Sig: Take 1 tablet (600 mg total) by mouth 2 (two) times a day   levalbuterol (Xopenex) 1 25 mg/3 mL nebulizer solution  Self No No   Sig: Take 3 mL (1 25 mg total) by nebulization 3 (three) times a day   metFORMIN (GLUCOPHAGE) 500 mg tablet  Self No No   Sig: Take 1 tablet (500 mg total) by mouth 2 (two) times a day After meals   montelukast (SINGULAIR) 10 mg tablet   No No   Sig: Take 1 tablet (10 mg total) by mouth daily at bedtime   potassium chloride (K-DUR,KLOR-CON) 10 mEq tablet   No No   Sig: Take 2 tablets (20 mEq total) by mouth 2 (two) times a day   sodium chloride (OCEAN) 0 65 % nasal spray  Self No No   Si spray into each nostril 3 (three) times a day   tiotropium (Spiriva Respimat) 1 25 MCG/ACT AERS inhaler   No No   Sig: Inhale 2 puffs daily   torsemide (DEMADEX) 20 mg tablet   No No   Sig: Take 1 tablet (20 mg total) by mouth 2 (two) times a day   torsemide (DEMADEX) 20 mg tablet   No No   Sig: Take 1 tablet (20 mg total) by mouth 2 (two) times a day      Facility-Administered Medications: None       Past Medical History:   Diagnosis Date    Acute gastritis without hemorrhage     last assessed 3/24/17    Asthma     Diabetes mellitus ([facility] [address] )     Gastric bypass status for obesity     Hyperlipidemia     Hypertension     Impaired fasting glucose     last assessed 5/10/17    Obesity     Viral gastroenteritis     last assessed 16       Past Surgical History:   Procedure Laterality Date    CARPAL TUNNEL RELEASE      bilateral    GASTRIC BYPASS      with han en y    HERNIA REPAIR      ventral inscisional    TONSILLECTOMY         Family History   Problem Relation Age of Onset    Stroke Mother     Hypertension Father      I have reviewed and agree with the history as documented      E-Cigarette/Vaping    E-Cigarette Use Never User E-Cigarette/Vaping Substances    Nicotine No     THC No     CBD No     Flavoring No     Other No     Unknown No      Social History     Tobacco Use    Smoking status: Light Tobacco Smoker     Types: Cigars    Smokeless tobacco: Current User    Tobacco comment: rare/hasn't smoked in 1 year   Vaping Use    Vaping Use: Never used   Substance Use Topics    Alcohol use: Yes     Alcohol/week: 1 0 standard drink     Types: 1 Standard drinks or equivalent per week     Comment: social    Drug use: No       Review of Systems   Constitutional: Negative for activity change, appetite change, fatigue and fever  HENT: Negative for ear pain and trouble swallowing  Eyes: Negative  Respiratory: Positive for shortness of breath  Negative for cough, chest tightness and wheezing  Cardiovascular: Negative for chest pain, palpitations and claudication  Gastrointestinal: Negative  Negative for abdominal pain and vomiting  Genitourinary: Negative  Musculoskeletal: Negative  Skin: Negative  Neurological: Negative  Negative for dizziness, light-headedness and headaches  Psychiatric/Behavioral: Negative  Physical Exam  Physical Exam  Vitals reviewed  Constitutional:       Appearance: He is well-developed  He is obese  HENT:      Head: Normocephalic and atraumatic  Right Ear: External ear normal       Left Ear: External ear normal       Nose: Nose normal    Eyes:      General:         Right eye: No discharge  Left eye: No discharge  Conjunctiva/sclera: Conjunctivae normal    Cardiovascular:      Rate and Rhythm: Tachycardia present  Rhythm irregular  Pulses: Normal pulses  Pulmonary:      Effort: Pulmonary effort is normal       Breath sounds: Wheezing present  Abdominal:      Palpations: Abdomen is soft  Tenderness: There is no abdominal tenderness  There is no guarding  Musculoskeletal:         General: Normal range of motion        Cervical back: Normal range of motion  Right lower le+ Pitting Edema present  Left lower le+ Pitting Edema present  Skin:     General: Skin is warm and dry  Capillary Refill: Capillary refill takes less than 2 seconds  Neurological:      General: No focal deficit present  Mental Status: He is alert and oriented to person, place, and time           Vital Signs  ED Triage Vitals [22 0850]   Temperature Pulse Respirations Blood Pressure SpO2   98 7 °F (37 1 °C) 105 20 119/76 95 %      Temp Source Heart Rate Source Patient Position - Orthostatic VS BP Location FiO2 (%)   Oral Monitor Sitting Left arm --      Pain Score       No Pain           Vitals:    22 0850 22 0900 22 1307 22 1315   BP: 119/76 119/76 115/73 115/73   Pulse: 105 102 105 100   Patient Position - Orthostatic VS: Sitting Sitting  Sitting         Visual Acuity      ED Medications  Medications   ipratropium-albuterol (DUO-NEB) 0 5-2 5 mg/3 mL inhalation solution 3 mL (3 mL Nebulization Given 22 1321)   apixaban (ELIQUIS) tablet 5 mg (5 mg Oral Given 22 1308)   metoprolol succinate (TOPROL-XL) 24 hr tablet 50 mg (50 mg Oral Given 22 1307)   furosemide (LASIX) injection 80 mg (80 mg Intravenous Given 22 1106)   iohexol (OMNIPAQUE) 350 MG/ML injection (SINGLE-DOSE) 100 mL (100 mL Intravenous Given 22 1235)       Diagnostic Studies  Results Reviewed     Procedure Component Value Units Date/Time    HS Troponin I 2hr [[id number]]  (Normal) Collected: 22 1038    Lab Status: Final result Specimen: Blood from Arm, Right Updated: 22 1130     hs TnI 2hr 20 ng/L      Delta 2hr hsTnI 0 ng/L     D-dimer, quantitative [[id number]]  (Abnormal) Collected: 22 1038    Lab Status: Final result Specimen: Blood from Arm, Right Updated: 22 1118     D-Dimer, Quant 0 98 ug/ml FEU     HS Troponin 0hr (reflex protocol) [[id number]]  (Normal) Collected: 22 0913    Lab Status: Final result Specimen: Blood from Arm, Right Updated: 02/12/22 1025     hs TnI 0hr 20 ng/L     NT-BNP PRO [[id number]]  (Abnormal) Collected: 02/12/22 0913    Lab Status: Final result Specimen: Blood from Arm, Right Updated: 02/12/22 0954     NT-proBNP 631 pg/mL     Comprehensive metabolic panel [[id number]]  (Abnormal) Collected: 02/12/22 0913    Lab Status: Final result Specimen: Blood from Arm, Right Updated: 02/12/22 0953     Sodium 135 mmol/L      Potassium 3 2 mmol/L      Chloride 94 mmol/L      CO2 31 mmol/L      ANION GAP 10 mmol/L      BUN 10 mg/dL      Creatinine 1 22 mg/dL      Glucose 142 mg/dL      Calcium 7 2 mg/dL      Corrected Calcium 8 3 mg/dL      AST 16 U/L      ALT 16 U/L      Alkaline Phosphatase 129 U/L      Total Protein 6 6 g/dL      Albumin 2 6 g/dL      Total Bilirubin 2 02 mg/dL      eGFR 66 ml/min/1 73sq m     Narrative:      [place] guidelines for Chronic Kidney Disease (CKD):     Stage 1 with normal or high GFR (GFR > 90 mL/min/1 73 square meters)    Stage 2 Mild CKD (GFR = 60-89 mL/min/1 73 square meters)    Stage 3A Moderate CKD (GFR = 45-59 mL/min/1 73 square meters)    Stage 3B Moderate CKD (GFR = 30-44 mL/min/1 73 square meters)    Stage 4 Severe CKD (GFR = 15-29 mL/min/1 73 square meters)    Stage 5 End Stage CKD (GFR <15 mL/min/1 73 square meters)  Note: GFR calculation is accurate only with a steady state creatinine    CBC and differential [[id number]]  (Abnormal) Collected: 02/12/22 0913    Lab Status: Final result Specimen: Blood from Arm, Right Updated: 02/12/22 0922     WBC 10 00 Thousand/uL      RBC 4 17 Million/uL      Hemoglobin 12 4 g/dL      Hematocrit 36 5 %      MCV 88 fL      MCH 29 7 pg      MCHC 34 0 g/dL      RDW 13 9 %      MPV 10 0 fL      Platelets 808 Thousands/uL      nRBC 0 /100 WBCs      Neutrophils Relative 84 %      Immat GRANS % 1 %      Lymphocytes Relative 6 %      Monocytes Relative 6 %      Eosinophils Relative 2 %      Basophils Relative 1 %      Neutrophils Absolute 8 44 Thousands/µL      Immature Grans Absolute 0 07 Thousand/uL      Lymphocytes Absolute 0 62 Thousands/µL      Monocytes Absolute 0 59 Thousand/µL      Eosinophils Absolute 0 22 Thousand/µL      Basophils Absolute 0 06 Thousands/µL                  CTA ED chest PE Study   Final Result by Jennifer Ross DO (02/12 2839)      No pulmonary embolus identified  No acute inflammatory process noted  0 6 cm left lower lobe pulmonary nodule  Recommend 12 month follow-up noncontrast CT of the chest to assess stability  Mild superior endplate height loss at T11 and T12 with associated Schmorl's nodes, favored to be chronic  Hepatic steatosis  Cholelithiasis  The study was marked in [facility] for immediate notification        Workstation performed: [id number]         X-ray chest 2 views   ED Interpretation by Cyrena Merlin, PA-C (02/12 1001)   No acute cardiopulmonary disease                 Procedures  ECG 12 Lead Documentation Only    Date/Time: 2/12/2022 11:05 AM  Performed by: Cyrena Merlin, PA-C  Authorized by: Cyrena Merlin, PA-C     Indications / Diagnosis:  SOB  ECG reviewed by me, the ED Provider: yes    Patient location:  ED  Interpretation:     Interpretation: abnormal    Rate:     ECG rate:  100    ECG rate assessment: tachycardic    Rhythm:     Rhythm: atrial fibrillation    Ectopy:     Ectopy: PVCs      PVCs:  Infrequent  QRS:     QRS axis:  Normal  Conduction:     Conduction: normal    ST segments:     ST segments:  Normal  T waves:     T waves: normal    Comments:      Ventricular rate 100 beats per minute  IN interval * milliseconds  QRS duration 74 milliseconds  QT//407 milliseconds  PRT axes *, 52, 108,    ECG interpretation-"atrial fibrillation with premature ventricular or aberrantly conducted complexes, low-voltage QRS, nonspecific ST and T-wave abnormality, probably digitalis effect, abnormal ECG"    ECG 12 Lead Documentation Only    Date/Time: 2/12/2022 11:12 AM  Performed by: Iqra Vallecillo PA-C  Authorized by: Iqra Vallecillo PA-C     Indications / Diagnosis:  Afib  ECG reviewed by me, the ED Provider: yes    Patient location:  ED  Previous ECG:     Previous ECG:  Compared to current    Similarity:  Changes noted  Interpretation:     Interpretation: normal    Rate:     ECG rate:  77    ECG rate assessment: normal    Rhythm:     Rhythm: sinus rhythm    Ectopy:     Ectopy: none    QRS:     QRS axis:  Normal  ST segments:     ST segments:  Normal  T waves:     T waves: normal    Comments:      Ventricular rate 77 beats per minute  MO interval * milliseconds  QRS duration 76 milliseconds  QT//373 milliseconds  PRT axes 98, 47, 103,    ECG interpretation-"Sinus tachycardia with blocked premature atrial complexes, low voltage QRS, Nonspecific T wave abnormality, Abnormal ECG"               ED Course  ED Course as of 02/12/22 1434   Sat Feb 12, 2022   1027 NT-proBNP(!): 631  Appears baseline   1028 hs TnI 0hr: 20  Appears baseline   1048 80 IV lasix, metoprolol 50mg, eliquis 5mg BID    Torsemide, 20mg BID outpatientOutpt f/u with cardiologyPer Dr Ede Brown   4319 D-Dimer, Quant(!): 0 98  Will get CT   1133 Delta 2hr hsTnI: 0   1321 CT Read;"No pulmonary embolus identified  No acute inflammatory process noted      0 6 cm left lower lobe pulmonary nodule  Recommend 12 month follow-up noncontrast CT of the chest to assess stability      Mild superior endplate height loss at T11 and T12 with associated Schmorl's nodes, favored to be chronic      Hepatic steatosis        Cholelithiasis  "   1404 Temperature(!): 101 9 °F (38 8 °C)  Will recheck temp prior to leaving   1415 Temperature: 98 1 °F (36 7 °C)  Checked with myself and RN   Last temp most likely mistype                                 SBIRT 20yo+      Most Recent Value   SBIRT (24 yo +)    In order to provide better care to our patients, we are screening all of our patients for alcohol and drug use  Would it be okay to ask you these screening questions? No Filed at: 02/12/2022 8231   Initial Alcohol Screen: US AUDIT-C     1  How often do you have a drink containing alcohol? 1 Filed at: 02/12/2022 0917   Audit-C Score 1 Filed at: 02/12/2022 0676          Wells' Criteria for PE      Most Recent Value   Wells' Criteria for PE    Clinical signs and symptoms of DVT 0 Filed at: 02/12/2022 1001   PE is primary diagnosis or equally likely 0 Filed at: 02/12/2022 1001   HR >100 1 5 Filed at: 02/12/2022 1001   Immobilization at least 3 days or Surgery in the previous 4 weeks 0 Filed at: 02/12/2022 1001   Previous, objectively diagnosed PE or DVT 0 Filed at: 02/12/2022 1001   Hemoptysis 0 Filed at: 02/12/2022 1001   Malignancy with treatment within 6 months or palliative 0 Filed at: 02/12/2022 1001   Wells' Criteria Total 1 5 Filed at: 02/12/2022 1001                MDM  Number of Diagnoses or Management Options  A-fib [facility]): new and requires workup  CHF (congestive heart failure) ([facility] [address] ): established and worsening  SOB (shortness of breath)  Diagnosis management comments: Patient is in no acute distress at this time  Patient feels about the same  No acute abnormalities noted on CT scan  BNP appears baseline  Tropinin appears baseline  Patient does not have a fever, appears to be entered in error at this time  No elevated white count  Consulted cardiology, who is okay with outpatient f/u, apxiban, metoprolol, and increased diuretic for now  Patient is aware of strict return precautions    Counseling: I had a detailed discussion with the patient and/or guardian regarding: the historical points, exam findings, and any diagnostic results supporting the discharge diagnosis, lab results, radiology results, discharge instructions reviewed with patient and/or family/caregiver and understanding was verbalized   Instructions given to return to the emergency department if symptoms worsen or persist, or if there are any questions or concerns that arise at home       All labs reviewed and utilized in the medical decision making process     All radiology studies independently viewed by me and interpreted by the radiologist     Portions of the record may have been created with voice recognition software   Occasional wrong word or "sound a like" substitutions may have occurred due to the inherent limitations of voice recognition software   Read the chart carefully and recognize, using context, where substitutions have occurred  Amount and/or Complexity of Data Reviewed  Clinical lab tests: ordered and reviewed  Tests in the radiology section of CPT®: ordered and reviewed  Discuss the patient with other providers: yes    Risk of Complications, Morbidity, and/or Mortality  Presenting problems: low  Diagnostic procedures: minimal  Management options: minimal    Patient Progress  Patient progress: stable      Disposition  Final diagnoses:   A-fib ([place] [address] )   SOB (shortness of breath)   CHF (congestive heart failure) ([place] [address] )     Time reflects when diagnosis was documented in both MDM as applicable and the Disposition within this note     Time User Action Codes Description Comment    2/12/2022 10:59 AM Jabari Nap [I48 91] A-fib ([place] [address] )     2/12/2022 10:59 AM Orrie Monas Add [R06 02] SOB (shortness of breath)     2/12/2022 10:59 AM Orrie Monas Add [I50 9] CHF (congestive heart failure) ([place] [address] )     2/12/2022 11:01 AM Orrie Monas Add [I50 33] Acute on chronic diastolic heart failure ([place] [address] )     2/12/2022 11:01 AM Orrie Monas Modify [I50 33] Acute on chronic diastolic heart failure ([place] [address] )     2/12/2022 11:02 AM Orrie Monas Modify [I50 33] Acute on chronic diastolic heart failure [facility])       ED Disposition     ED Disposition Condition Date/Time Comment    Discharge Stable Sat Feb 12, 2022  1:23 PM Alba Orta discharge to home/self care              Follow-up Information     Follow up With Specialties Details Why Contact Info Additional Information    Maggie Loredo MD Family Medicine   [address] [address]  [phone]       [address] [place] [address] 34 [facility] Emergency Department Emergency Medicine  As needed, If symptoms worsen [address]  [address] [facility] [address] [facility], [address], [place], [place], [address] [phone]          Current Discharge Medication List      START taking these medications    Details   apixaban (Eliquis) 5 mg Take 1 tablet (5 mg total) by mouth 2 (two) times a day  Qty: 60 tablet, Refills: 0    Associated Diagnoses: A-fib ([facility])      metoprolol succinate (TOPROL-XL) 25 mg 24 hr tablet Take 2 tablets (50 mg total) by mouth daily  Qty: 20 tablet, Refills: 0    Associated Diagnoses: A-fib ([facility] [address] )         CONTINUE these medications which have CHANGED    Details   torsemide (DEMADEX) 20 mg tablet Take 1 tablet (20 mg total) by mouth 2 (two) times a day  Qty: 60 tablet, Refills: 0    Associated Diagnoses: Acute on chronic diastolic heart failure ([facility] )         CONTINUE these medications which have NOT CHANGED    Details   Accu-Chek FastClix Lancets MISC Test blood sugar twice daily  Qty: 200 each, Refills: 3    Associated Diagnoses: Type 2 diabetes mellitus with hyperglycemia, without long-term current use of insulin ([facility])      albuterol (PROVENTIL HFA,VENTOLIN HFA) 90 mcg/act inhaler TAKE 2 PUFFS BY MOUTH EVERY 4 HOURS AS NEEDED FOR WHEEZE  Qty: 8 g, Refills: 3    Associated Diagnoses: Mild intermittent asthma without complication      atorvastatin (LIPITOR) 10 mg tablet TAKE 1 TABLET BY MOUTH EVERY DAY  Qty: 30 tablet, Refills: 5    Associated Diagnoses: Mixed hyperlipidemia      Blood Glucose Monitoring Suppl (Accu-Chek Guide) w/Device KIT Use 2 (two) times a day Test blood sugar twice daily  Qty: 1 kit, Refills: 0    Associated Diagnoses: Type 2 diabetes mellitus with hyperglycemia, without long-term current use of insulin ([facility])      budesonide-formoterol (Symbicort) 160-4 5 mcg/act inhaler Inhale 2 puffs 2 (two) times a day Rinse mouth after use  Qty: 10 2 g, Refills: 2    Associated Diagnoses: Moderate persistent asthma with acute exacerbation      glucose blood (Accu-Chek Guide) test strip Test blood sugar twice daily  Qty: 200 strip, Refills: 3    Associated Diagnoses: Type 2 diabetes mellitus with hyperglycemia, without long-term current use of insulin ([facility])      guaiFENesin (MUCINEX) 600 mg 12 hr tablet Take 1 tablet (600 mg total) by mouth 2 (two) times a day  Qty: 60 tablet, Refills: 0    Associated Diagnoses: Asthma exacerbation      Januvia 100 MG tablet TAKE 1 TABLET BY MOUTH EVERY DAY  Qty: 30 tablet, Refills: 1    Associated Diagnoses: Type 2 diabetes mellitus with hyperglycemia, without long-term current use of insulin ([facility])      levalbuterol (Xopenex) 1 25 mg/3 mL nebulizer solution Take 3 mL (1 25 mg total) by nebulization 3 (three) times a day  Qty: 270 mL, Refills: 3    Associated Diagnoses: Mild intermittent asthma without complication      metFORMIN (GLUCOPHAGE) 500 mg tablet Take 1 tablet (500 mg total) by mouth 2 (two) times a day After meals  Qty: 60 tablet, Refills: 6    Associated Diagnoses: Type 2 diabetes mellitus with hyperglycemia, without long-term current use of insulin ([facility])      montelukast (SINGULAIR) 10 mg tablet Take 1 tablet (10 mg total) by mouth daily at bedtime  Qty: 90 tablet, Refills: 1    Associated Diagnoses:  Moderate persistent asthma with acute exacerbation      MULTIPLE VITAMINS-CALCIUM PO Take 1 tablet by mouth daily      potassium chloride (K-DUR,KLOR-CON) 10 mEq tablet Take 2 tablets (20 mEq total) by mouth 2 (two) times a day  Qty: 360 tablet, Refills: 1    Associated Diagnoses: Hypokalemia      sodium chloride (OCEAN) 0 65 % nasal spray 1 spray into each nostril 3 (three) times a day  Qty: 30 mL, Refills: 0    Associated Diagnoses: Asthma exacerbation      tiotropium (Spiriva Respimat) 1 25 MCG/ACT AERS inhaler Inhale 2 puffs daily  Qty: 4 g, Refills: 3    Associated Diagnoses: Moderate persistent asthma with acute exacerbation             No discharge procedures on file      PDMP Review       Value Time User    PDMP Reviewed  Yes 9/23/2021 12:07 AM Milta Halsted, DO          ED Provider  Electronically Signed by           Shanika Randall PA-C  02/12/22 8254

## 2022-02-12 NOTE — ED NOTES
Pt given written and verbal discharge instructions prior to RN final eval       Brenden Madrid RN  02/12/22 6744

## 2022-02-12 NOTE — DISCHARGE INSTRUCTIONS
metoprolol 50mg, eliquis 5mg BID    Torsemide, 20mg BID outpatient       CTA ED chest PE Study:   "No pulmonary embolus identified  No acute inflammatory process noted , 0 6 cm left lower lobe pulmonary nodule  Recommend 12 month follow-up noncontrast CT of the chest to assess stability ,     Mild superior endplate height loss at T11 and T12 with associated Schmorl's nodes, favored to be chronic ,     Hepatic steatosis  ,     Cholelithiasis  , "

## 2022-02-18 ENCOUNTER — TELEMEDICINE (OUTPATIENT)
Dept: FAMILY MEDICINE CLINIC | Facility: CLINIC | Age: 57
End: 2022-02-18
Payer: COMMERCIAL

## 2022-02-18 DIAGNOSIS — H10.33 ACUTE BACTERIAL CONJUNCTIVITIS OF BOTH EYES: Primary | ICD-10-CM

## 2022-02-18 PROCEDURE — 99213 OFFICE O/P EST LOW 20 MIN: CPT | Performed by: FAMILY MEDICINE

## 2022-02-18 RX ORDER — TOBRAMYCIN 3 MG/ML
1 SOLUTION/ DROPS OPHTHALMIC
Qty: 5 ML | Refills: 0 | Status: SHIPPED | OUTPATIENT
Start: 2022-02-18 | End: 2022-03-16 | Stop reason: ALTCHOICE

## 2022-02-18 NOTE — PROGRESS NOTES
General Cardiology Outpatient Progress Note    Shannan Arora 49 y o  male   MRN: [id number]  Encounter: [id number]    Assessment:  Patient Active Problem List    Diagnosis Date Noted    Stage 3 chronic kidney disease ([facility] ) 12/23/2021    Hypokalemia 11/14/2021    Acute on chronic diastolic heart failure ([facility] ) 11/12/2021    Moderate asthma 10/11/2021    Dyspnea on exertion 10/11/2021    Hyponatremia 07/31/2021    Cardiomyopathy ([facility] ) 07/19/2021    Well adult exam 05/14/2021    Decreased left ventricular systolic function 93/13/1159    Bilateral leg edema 02/19/2020    Edema of both feet 01/23/2020    VICKY (obstructive sleep apnea)     Daytime sleepiness 07/17/2019    Mixed hyperlipidemia 04/18/2019    Morbid obesity ([facility] ) 04/18/2019    Vitamin B12 deficiency 04/18/2019    Vitamin D deficiency 04/18/2019    Knee sprain, bilateral 06/14/2018    Primary osteoarthritis of both knees 06/14/2018    Irritable bowel syndrome with diarrhea 01/30/2018    Essential hypertension 01/30/2018    Type 2 diabetes mellitus with hyperglycemia ([facility] ) 01/30/2018       Today's Plan:  · Increase torsemide to 40 mg BID for next 3-5 days  · Increase potassium to 40 mEq qAM and 20 mEq qPM while on higher torsemide dose   To complete blood work in 7-10 days  Plan:  Chronic HFpEF; LVEF 55%; LVIDd 6 0 cm; NYHA III; ACC/AHA Stage C   TTE 03/25/2020: LVEF 40-45%  LVIDd 6 12 cm  Normal RV  TTE 07/19/2021: LVEF 50%  LVIDd 6 13 cm  Grade 1 DD  Normal RV  Trace MR and TR  Mildly dilated IVC  TTE 11/12/2021: LVEF 55%  LVIDd 6 0 cm  Grade 1 DD  Normal RV  Trace TR  Reviewed importance of low sodium diet and fluid restriction  Weight of 318 lbs on 01/01 (day of discharge)  Today, weighs 315 lbs  Most recent BMP from 02/12/2022: sodium 135; potassium 3 2; BUN 10; creatinine 1 22; eGFR 66  Neurohormonal Blockade:  --Beta Blocker: metoprolol succinate 50 mg daily     --ARNi / ACEi / ARB: No    --Aldosterone Antagonist: No    --SGLT2 Inhibitor: No   --Diuretic: torsemide 20 mg BID with potassium 20 mEq BID  Sudden Cardiac Death Risk Reduction:  --LVEF >35%  Electrical Resynchronization:  --Candidacy for BiV device: narrow QRS  Advanced Therapies: Will continue to monitor  Atrial fibrillation   [id number] = 3 (HF, HTN, DM)  Diagnosed 02/2022  Anticoagulation on Eliquis  Rate control: BB as above  Rhythm control: No     Hypertension   BP of 100/78 mmHg in office today  Continue on medications as above  Hyperlipidemia   Most recent lipid panel from 10/01/2021: cholesterol 157; TG 90; HDL 66; calculated LDL 73  Continue on atorvastatin 10 mg daily  Diabetes mellitus, type II     Non-insulin dependent  Most recent hemoglobin A1c of 7 0  from 11/10/2021  Obstructive sleep apnea: awaiting new CPAP from company; device was recalled  Chronic respiratory failure  Asthma, moderate persistent   S/p gastric bypass ([facility]-Y)surgery   History of tobacco abuse  Carpal tunnel syndrome, bilateral     HPI:   Gerson Lofton is a 44-year-old man with a PMH as above who presents to the office for follow-up      12/16/2021 with RODRIGUE Beebe: "Presents for 1 month follow up  Over past week has noted increased TELLES, orthopnea and wheezing  Cannot differentiate between heart failure symptoms and asthma  Wife feels his lower extremity edema is worse although his weight is actually down  Already finished steroid taper  Has been using his inhaler at least Q4 hours over the past week  No CP, dizziness, syncope "    Admitted to [facility] from 12/23 to 01/01/2022 after presenting with worsening SOB  Started on IV Lasix (later switched to IV Bumex) and IV steroids  Did receive 2 doses of metolazone while inpatient  Transitioned to PO torsemide on day of discharge  Lost 12 lbs and net negative 15 L this admission  Presented to [facility] ED on 02/12/2022 with worsening SOB   Diagnosed with Afib and was started on Eliquis and BB  Also received 80 mg IV Lasix, and was discharged home  02/22/2022: Patient presents for hospital follow-up  Has been feeling "good" until yesterday  Developed TELLES and noted orthopnea overnight  Also with recently worsening of cough resulting in increased use of PRN inhalers/nebulizers  Denies recent dietary indiscretion  Drinking no more than 2000 mL daily  Is completing daily weights; reports down-trending weights over past few days  Has noted slight decrease in response to PO torsemide  Past Medical History:   Diagnosis Date    Acute gastritis without hemorrhage     last assessed 3/24/17    Asthma     Diabetes mellitus ([facility] )     Gastric bypass status for obesity     Hyperlipidemia     Hypertension     Impaired fasting glucose     last assessed 5/10/17    Obesity     Viral gastroenteritis     last assessed 11/4/16       Review of Systems   Constitutional: Negative for activity change, appetite change, fatigue, fever and unexpected weight change  HENT: Negative for congestion, postnasal drip, rhinorrhea, sneezing, sore throat and trouble swallowing  Eyes: Negative  Respiratory: Positive for shortness of breath (with exertion)  Negative for cough and chest tightness  Cardiovascular: Positive for leg swelling  Negative for chest pain and palpitations  Gastrointestinal: Negative for abdominal distention, abdominal pain, diarrhea, nausea and vomiting  Endocrine: Negative  Genitourinary: Negative for decreased urine volume, difficulty urinating, dysuria and urgency  Musculoskeletal: Negative  Skin: Negative  Allergic/Immunologic: Negative  Neurological: Negative for dizziness, tremors, syncope, weakness, light-headedness and headaches  Hematological: Negative  Psychiatric/Behavioral: Positive for sleep disturbance  Negative for agitation and confusion  The patient is not nervous/anxious      14-point ROS completed and negative except as stated above and/or in the HPI      Allergies   Allergen Reactions    Azithromycin Other (See Comments)     Shaky, uneasy feeling     Bactrim [Sulfamethoxazole-Trimethoprim] Other (See Comments)     shakiness         Current Outpatient Medications:     Accu-Chek FastClix Lancets MISC, Test blood sugar twice daily, Disp: 200 each, Rfl: 3    albuterol (PROVENTIL HFA,VENTOLIN HFA) 90 mcg/act inhaler, TAKE 2 PUFFS BY MOUTH EVERY 4 HOURS AS NEEDED FOR WHEEZE, Disp: 8 g, Rfl: 3    apixaban (Eliquis) 5 mg, Take 1 tablet (5 mg total) by mouth 2 (two) times a day, Disp: 60 tablet, Rfl: 0    atorvastatin (LIPITOR) 10 mg tablet, TAKE 1 TABLET BY MOUTH EVERY DAY, Disp: 30 tablet, Rfl: 5    Blood Glucose Monitoring Suppl (Accu-Chek Guide) w/Device KIT, Use 2 (two) times a day Test blood sugar twice daily, Disp: 1 kit, Rfl: 0    budesonide-formoterol (Symbicort) 160-4 5 mcg/act inhaler, Inhale 2 puffs 2 (two) times a day Rinse mouth after use , Disp: 10 2 g, Rfl: 2    glucose blood (Accu-Chek Guide) test strip, Test blood sugar twice daily, Disp: 200 strip, Rfl: 3    guaiFENesin (MUCINEX) 600 mg 12 hr tablet, Take 1 tablet (600 mg total) by mouth 2 (two) times a day, Disp: 60 tablet, Rfl: 0    Januvia 100 MG tablet, TAKE 1 TABLET BY MOUTH EVERY DAY, Disp: 30 tablet, Rfl: 1    levalbuterol (Xopenex) 1 25 mg/3 mL nebulizer solution, Take 3 mL (1 25 mg total) by nebulization 3 (three) times a day, Disp: 270 mL, Rfl: 3    metFORMIN (GLUCOPHAGE) 500 mg tablet, Take 1 tablet (500 mg total) by mouth 2 (two) times a day After meals, Disp: 60 tablet, Rfl: 6    metoprolol succinate (TOPROL-XL) 25 mg 24 hr tablet, Take 2 tablets (50 mg total) by mouth daily, Disp: 20 tablet, Rfl: 0    montelukast (SINGULAIR) 10 mg tablet, Take 1 tablet (10 mg total) by mouth daily at bedtime, Disp: 90 tablet, Rfl: 1    MULTIPLE VITAMINS-CALCIUM PO, Take 1 tablet by mouth daily, Disp: , Rfl:     potassium chloride (K-DUR,KLOR-CON) 10 mEq tablet, Take 2 tablets (20 mEq total) by mouth 2 (two) times a day, Disp: 360 tablet, Rfl: 1    sodium chloride (OCEAN) 0 65 % nasal spray, 1 spray into each nostril 3 (three) times a day, Disp: 30 mL, Rfl: 0    tiotropium (Spiriva Respimat) 1 25 MCG/ACT AERS inhaler, Inhale 2 puffs daily, Disp: 4 g, Rfl: 3    tobramycin (TOBREX) 0 3 % SOLN, Administer 1 drop to both eyes every 4 (four) hours while awake, Disp: 5 mL, Rfl: 0    torsemide (DEMADEX) 20 mg tablet, Take 1 tablet (20 mg total) by mouth 2 (two) times a day, Disp: 60 tablet, Rfl: 0    Social History     Socioeconomic History    Marital status: /Civil Union     Spouse name: Not on file    Number of children: Not on file    Years of education: Not on file    Highest education level: Not on file   Occupational History    Not on file   Tobacco Use    Smoking status: Light Tobacco Smoker     Types: Cigars    Smokeless tobacco: Current User    Tobacco comment: rare/hasn't smoked in 1 year   Vaping Use    Vaping Use: Never used   Substance and Sexual Activity    Alcohol use: Yes     Alcohol/week: 1 0 standard drink     Types: 1 Standard drinks or equivalent per week     Comment: social    Drug use: No    Sexual activity: Yes     Partners: Female     Birth control/protection: None   Other Topics Concern    Not on file   Social History Narrative    Not on file     Social Determinants of Health     Financial Resource Strain: Not on file   Food Insecurity: Not on file   Transportation Needs: Not on file   Physical Activity: Not on file   Stress: Not on file   Social Connections: Not on file   Intimate Partner Violence: Not on file   Housing Stability: Not on file     Family History   Problem Relation Age of Onset    Stroke Mother     Hypertension Father        Vitals:   Blood pressure 100/78, pulse 83, height 6' (1 829 m), weight (!) 143 kg (315 lb 1 6 oz), SpO2 95 %  Body mass index is 42 74 kg/m²      Wt Readings from Last 3 Encounters:   22 (!) 143 kg (315 lb 1 6 oz)   22 (!) 145 kg (319 lb)   22 (!) 148 kg (325 lb 6 4 oz)     Vitals:    22 0829   BP: 100/78   BP Location: Right arm   Patient Position: Sitting   Cuff Size: Standard   Pulse: 83   SpO2: 95%   Weight: (!) 143 kg (315 lb 1 6 oz)   Height: 6' (1 829 m)       Physical Exam  Vitals reviewed  Constitutional:       General: He is awake  He is not in acute distress  Appearance: Normal appearance  He is well-developed and overweight  Comments: Fabric face mask present   HENT:      Head: Normocephalic  Nose: Nose normal       Mouth/Throat:      Mouth: Mucous membranes are moist    Eyes:      General: No scleral icterus  Conjunctiva/sclera: Conjunctivae normal    Neck:      Vascular: JVD present  Trachea: No tracheal deviation  Cardiovascular:      Rate and Rhythm: Normal rate and regular rhythm  Occasional extrasystoles are present  Pulses: Normal pulses  Heart sounds: No murmur heard  Pulmonary:      Effort: Pulmonary effort is normal  No tachypnea, bradypnea, accessory muscle usage or respiratory distress  Breath sounds: Normal air entry  Wheezing (prolonged expiratory) present  No decreased breath sounds or rales  Abdominal:      General: Bowel sounds are normal  There is distension  Palpations: Abdomen is soft  Tenderness: There is no abdominal tenderness  Musculoskeletal:      Cervical back: Neck supple  Right lower le+ Pitting Edema present  Left lower le+ Pitting Edema present  Skin:     General: Skin is warm and dry  Coloration: Skin is pale  Skin is not jaundiced  Neurological:      General: No focal deficit present  Mental Status: He is alert and oriented to person, place, and time     Psychiatric:         Attention and Perception: Attention normal          Mood and Affect: Mood and affect normal          Speech: Speech normal          Behavior: Behavior normal  Behavior is cooperative  Thought Content:  Thought content normal      Labs & Results:  Lab Results   Component Value Date    WBC 10 00 02/12/2022    HGB 12 4 02/12/2022    HCT 36 5 02/12/2022    MCV 88 02/12/2022     02/12/2022     Lab Results   Component Value Date    SODIUM 135 (L) 02/12/2022    K 3 2 (L) 02/12/2022    CL 94 (L) 02/12/2022    CO2 31 02/12/2022    BUN 10 02/12/2022    CREATININE 1 22 02/12/2022    GLUC 142 (H) 02/12/2022    CALCIUM 7 2 (L) 02/12/2022     No results found for: INR, PROTIME  Lab Results   Component Value Date    NTBNP 631 (H) 02/12/2022      Jonathan Marie PA-C

## 2022-02-18 NOTE — PROGRESS NOTES
Virtual Regular Visit    Verification of patient location:    Patient is located in the following state in which I hold an active license [place]      Assessment/Plan:    Problem List Items Addressed This Visit     None      Visit Diagnoses     Acute bacterial conjunctivitis of both eyes    -  Primary    Relevant Medications    tobramycin (TOBREX) 0 3 % SOLN               Reason for visit is   Chief Complaint   Patient presents with    Virtual Regular Visit     Possible pink eye        Encounter provider Marquis Escobedo MD    Provider located at [address] [place] [id number]      Recent Visits  No visits were found meeting these conditions  Showing recent visits within past 7 days and meeting all other requirements  Today's Visits  Date Type Provider Dept   02/18/22 [address], [address] today's visits and meeting all other requirements  Future Appointments  No visits were found meeting these conditions  Showing future appointments within next 150 days and meeting all other requirements       The patient was identified by name and date of birth  Allison Gibson was informed that this is a telemedicine visit and that the visit is being conducted through [address] [place] [address] Now and patient was informed that this is a secure, HIPAA-compliant platform  He agrees to proceed     My office door was closed  No one else was in the room  He acknowledged consent and understanding of privacy and security of the video platform  The patient has agreed to participate and understands they can discontinue the visit at any time  Patient is aware this is a billable service  Subjective  Allison Gibson is a 49 y o  male for pink eye   HPI     C/o pink eyes for 2 days  Crusty in the morning  Uncomfortable  Denies injury  Vision is ok  Denies fever, SOB, CP, n/v/abd pain  Does not wear contact lenses       Past Medical History: Diagnosis Date    Acute gastritis without hemorrhage     last assessed 3/24/17    Asthma     [facility] mellitus ([facility]     Gastric bypass status for obesity     Hyperlipidemia     Hypertension     Impaired fasting glucose     last assessed 5/10/17    Obesity     Viral gastroenteritis     last assessed 11/4/16       Past Surgical History:   Procedure Laterality Date    CARPAL TUNNEL RELEASE      bilateral    GASTRIC BYPASS  2004    with han en y    HERNIA REPAIR      ventral inscisional    TONSILLECTOMY         Current Outpatient Medications   Medication Sig Dispense Refill    Accu-Chek FastClix Lancets MISC Test blood sugar twice daily 200 each 3    albuterol (PROVENTIL HFA,VENTOLIN HFA) 90 mcg/act inhaler TAKE 2 PUFFS BY MOUTH EVERY 4 HOURS AS NEEDED FOR WHEEZE 8 g 3    apixaban (Eliquis) 5 mg Take 1 tablet (5 mg total) by mouth 2 (two) times a day 60 tablet 0    atorvastatin (LIPITOR) 10 mg tablet TAKE 1 TABLET BY MOUTH EVERY DAY 30 tablet 5    Blood Glucose Monitoring Suppl (Accu-Chek Guide) w/Device KIT Use 2 (two) times a day Test blood sugar twice daily 1 kit 0    budesonide-formoterol (Symbicort) 160-4 5 mcg/act inhaler Inhale 2 puffs 2 (two) times a day Rinse mouth after use   10 2 g 2    glucose blood (Accu-Chek Guide) test strip Test blood sugar twice daily 200 strip 3    guaiFENesin (MUCINEX) 600 mg 12 hr tablet Take 1 tablet (600 mg total) by mouth 2 (two) times a day 60 tablet 0    Januvia 100 MG tablet TAKE 1 TABLET BY MOUTH EVERY DAY 30 tablet 1    levalbuterol (Xopenex) 1 25 mg/3 mL nebulizer solution Take 3 mL (1 25 mg total) by nebulization 3 (three) times a day 270 mL 3    metFORMIN (GLUCOPHAGE) 500 mg tablet Take 1 tablet (500 mg total) by mouth 2 (two) times a day After meals 60 tablet 6    metoprolol succinate (TOPROL-XL) 25 mg 24 hr tablet Take 2 tablets (50 mg total) by mouth daily 20 tablet 0    montelukast (SINGULAIR) 10 mg tablet Take 1 tablet (10 mg total) by mouth daily at bedtime 90 tablet 1    MULTIPLE VITAMINS-CALCIUM PO Take 1 tablet by mouth daily      potassium chloride (K-DUR,KLOR-CON) 10 mEq tablet Take 2 tablets (20 mEq total) by mouth 2 (two) times a day 360 tablet 1    sodium chloride (OCEAN) 0 65 % nasal spray 1 spray into each nostril 3 (three) times a day 30 mL 0    tiotropium (Spiriva Respimat) 1 25 MCG/ACT AERS inhaler Inhale 2 puffs daily 4 g 3    tobramycin (TOBREX) 0 3 % SOLN Administer 1 drop to both eyes every 4 (four) hours while awake 5 mL 0    torsemide (DEMADEX) 20 mg tablet Take 1 tablet (20 mg total) by mouth 2 (two) times a day 60 tablet 0     No current facility-administered medications for this visit  Allergies   Allergen Reactions    Azithromycin Other (See Comments)     Shaky, uneasy feeling     Bactrim [Sulfamethoxazole-Trimethoprim] Other (See Comments)     shakiness       Review of Systems   Constitutional: Negative for appetite change, chills and fever  HENT: Negative for congestion, ear pain, sinus pain and sore throat  Eyes: Positive for discharge  Negative for itching and visual disturbance  Respiratory: Negative for apnea, cough, chest tightness, shortness of breath and wheezing  Cardiovascular: Negative for chest pain, palpitations and leg swelling  Gastrointestinal: Negative for abdominal pain, anal bleeding, constipation, diarrhea, nausea and vomiting  Endocrine: Negative for cold intolerance, heat intolerance and polyuria  Genitourinary: Negative for difficulty urinating and dysuria  Musculoskeletal: Negative for arthralgias, back pain and myalgias  Skin: Negative for rash  Neurological: Negative for dizziness and headaches  Psychiatric/Behavioral: Negative for agitation  Video Exam    There were no vitals filed for this visit  Physical Exam  Constitutional:       Appearance: Normal appearance  Eyes:      General:         Right eye: No discharge  Left eye: No discharge  Conjunctiva/sclera: Conjunctivae normal    Cardiovascular:      Pulses: Normal pulses  Musculoskeletal:         General: Normal range of motion  Cervical back: Normal range of motion  Neurological:      Mental Status: He is alert  I spent 15 minutes directly with the patient during this visit    [address] verbally agrees to participate in [employer]  Pt is aware that [employer] could be limited without vital signs or the ability to perform a full hands-on physical Mercedes Coto understands he or the provider may request at any time to terminate the video visit and request the patient to seek care or treatment in person

## 2022-02-22 ENCOUNTER — OFFICE VISIT (OUTPATIENT)
Dept: CARDIOLOGY CLINIC | Facility: CLINIC | Age: 57
End: 2022-02-22
Payer: COMMERCIAL

## 2022-02-22 VITALS
DIASTOLIC BLOOD PRESSURE: 78 MMHG | BODY MASS INDEX: 42.66 KG/M2 | HEIGHT: 72 IN | SYSTOLIC BLOOD PRESSURE: 100 MMHG | WEIGHT: 315 LBS | HEART RATE: 83 BPM | OXYGEN SATURATION: 95 %

## 2022-02-22 DIAGNOSIS — G47.33 OBSTRUCTIVE SLEEP APNEA: ICD-10-CM

## 2022-02-22 DIAGNOSIS — I50.32 CHRONIC HEART FAILURE WITH PRESERVED EJECTION FRACTION (HCC): Primary | ICD-10-CM

## 2022-02-22 DIAGNOSIS — I48.0 PAROXYSMAL ATRIAL FIBRILLATION (HCC): ICD-10-CM

## 2022-02-22 DIAGNOSIS — E11.9 TYPE 2 DIABETES MELLITUS WITHOUT COMPLICATION, WITHOUT LONG-TERM CURRENT USE OF INSULIN (HCC): ICD-10-CM

## 2022-02-22 DIAGNOSIS — E78.5 HYPERLIPIDEMIA, UNSPECIFIED HYPERLIPIDEMIA TYPE: ICD-10-CM

## 2022-02-22 DIAGNOSIS — I10 HYPERTENSION, UNSPECIFIED TYPE: ICD-10-CM

## 2022-02-22 DIAGNOSIS — Z09 HOSPITAL DISCHARGE FOLLOW-UP: ICD-10-CM

## 2022-02-22 PROCEDURE — 3074F SYST BP LT 130 MM HG: CPT | Performed by: INTERNAL MEDICINE

## 2022-02-22 PROCEDURE — 3078F DIAST BP <80 MM HG: CPT | Performed by: INTERNAL MEDICINE

## 2022-02-22 PROCEDURE — 93000 ELECTROCARDIOGRAM COMPLETE: CPT | Performed by: INTERNAL MEDICINE

## 2022-02-22 PROCEDURE — 3008F BODY MASS INDEX DOCD: CPT | Performed by: INTERNAL MEDICINE

## 2022-02-22 PROCEDURE — 99214 OFFICE O/P EST MOD 30 MIN: CPT | Performed by: INTERNAL MEDICINE

## 2022-02-22 NOTE — PATIENT INSTRUCTIONS
Increase torsemide to 40 mg twice a day for next 3-5 days or until symptoms improve  Also increase potassium to 2 tablets in the AM and 1 tablet in PM while on this higher dose  Complete blood work in 7-10 days; no need to fast        Please weigh yourself every day and keep a detailed log of weights  Contact the Heart Failure program at [phone] if you gain 3 lbs overnight or 5 lbs in 5-7 days  Limit daily sodium/salt intake to 6657-2798 mg daily to prevent fluid retention  Avoid canned foods, [employer] food, and processed meat (hot dogs, lunch meat, and sausage etc )  Limit fluid intake to 2000 mL or 2L (about 60-65 ounces) daily  Bring complete list of medications and log of daily weights to your follow-up appointment

## 2022-02-28 DIAGNOSIS — E11.65 TYPE 2 DIABETES MELLITUS WITH HYPERGLYCEMIA, WITHOUT LONG-TERM CURRENT USE OF INSULIN (HCC): ICD-10-CM

## 2022-02-28 DIAGNOSIS — J45.20 MILD INTERMITTENT ASTHMA WITHOUT COMPLICATION: ICD-10-CM

## 2022-02-28 RX ORDER — ALBUTEROL SULFATE 90 UG/1
2 AEROSOL, METERED RESPIRATORY (INHALATION) EVERY 4 HOURS PRN
Qty: 8 G | Refills: 3 | Status: SHIPPED | OUTPATIENT
Start: 2022-02-28 | End: 2022-07-06

## 2022-03-01 ENCOUNTER — APPOINTMENT (EMERGENCY)
Dept: RADIOLOGY | Facility: HOSPITAL | Age: 57
DRG: 264 | End: 2022-03-01
Payer: COMMERCIAL

## 2022-03-01 ENCOUNTER — HOSPITAL ENCOUNTER (INPATIENT)
Facility: HOSPITAL | Age: 57
LOS: 8 days | Discharge: HOME | DRG: 264 | End: 2022-03-10
Attending: EMERGENCY MEDICINE | Admitting: FAMILY MEDICINE
Payer: COMMERCIAL

## 2022-03-01 DIAGNOSIS — I50.9 ACUTE EXACERBATION OF CHF (CONGESTIVE HEART FAILURE) (HCC): Primary | ICD-10-CM

## 2022-03-01 DIAGNOSIS — J96.01 ACUTE RESPIRATORY FAILURE WITH HYPOXIA (HCC): ICD-10-CM

## 2022-03-01 DIAGNOSIS — I50.33 ACUTE ON CHRONIC DIASTOLIC HEART FAILURE (HCC): ICD-10-CM

## 2022-03-01 DIAGNOSIS — J45.901 ASTHMA EXACERBATION: ICD-10-CM

## 2022-03-01 PROBLEM — I48.0 PAROXYSMAL ATRIAL FIBRILLATION (HCC): Status: ACTIVE | Noted: 2022-03-01

## 2022-03-01 LAB
2HR DELTA HS TROPONIN: -2 NG/L
4HR DELTA HS TROPONIN: -2 NG/L
ALBUMIN SERPL BCP-MCNC: 2.7 G/DL (ref 3.5–5)
ALP SERPL-CCNC: 106 U/L (ref 46–116)
ALT SERPL W P-5'-P-CCNC: 13 U/L (ref 12–78)
ANION GAP SERPL CALCULATED.3IONS-SCNC: 8 MMOL/L (ref 4–13)
AST SERPL W P-5'-P-CCNC: 12 U/L (ref 5–45)
ATRIAL RATE: 241 BPM
BASE EX.OXY STD BLDV CALC-SCNC: 91 % (ref 60–80)
BASE EXCESS BLDV CALC-SCNC: 6.6 MMOL/L
BASOPHILS # BLD AUTO: 0.09 THOUSANDS/ΜL (ref 0–0.1)
BASOPHILS NFR BLD AUTO: 1 % (ref 0–1)
BILIRUB SERPL-MCNC: 0.8 MG/DL (ref 0.2–1)
BUN SERPL-MCNC: 15 MG/DL (ref 5–25)
CALCIUM ALBUM COR SERPL-MCNC: 8.6 MG/DL (ref 8.3–10.1)
CALCIUM SERPL-MCNC: 7.6 MG/DL (ref 8.3–10.1)
CARDIAC TROPONIN I PNL SERPL HS: 16 NG/L
CARDIAC TROPONIN I PNL SERPL HS: 16 NG/L
CARDIAC TROPONIN I PNL SERPL HS: 18 NG/L
CHLORIDE SERPL-SCNC: 100 MMOL/L (ref 100–108)
CO2 SERPL-SCNC: 33 MMOL/L (ref 21–32)
CREAT SERPL-MCNC: 1.41 MG/DL (ref 0.6–1.3)
EOSINOPHIL # BLD AUTO: 0.99 THOUSAND/ΜL (ref 0–0.61)
EOSINOPHIL NFR BLD AUTO: 8 % (ref 0–6)
ERYTHROCYTE [DISTWIDTH] IN BLOOD BY AUTOMATED COUNT: 15.1 % (ref 11.6–15.1)
FLUAV RNA RESP QL NAA+PROBE: NEGATIVE
FLUBV RNA RESP QL NAA+PROBE: NEGATIVE
GFR SERPL CREATININE-BSD FRML MDRD: 56 ML/MIN/1.73SQ M
GLUCOSE SERPL-MCNC: 118 MG/DL (ref 65–140)
GLUCOSE SERPL-MCNC: 314 MG/DL (ref 65–140)
GLUCOSE SERPL-MCNC: 332 MG/DL (ref 65–140)
HCO3 BLDV-SCNC: 32.8 MMOL/L (ref 24–30)
HCT VFR BLD AUTO: 38.3 % (ref 36.5–49.3)
HGB BLD-MCNC: 12.4 G/DL (ref 12–17)
IMM GRANULOCYTES # BLD AUTO: 0.09 THOUSAND/UL (ref 0–0.2)
IMM GRANULOCYTES NFR BLD AUTO: 1 % (ref 0–2)
LYMPHOCYTES # BLD AUTO: 1.27 THOUSANDS/ΜL (ref 0.6–4.47)
LYMPHOCYTES NFR BLD AUTO: 10 % (ref 14–44)
MCH RBC QN AUTO: 29.9 PG (ref 26.8–34.3)
MCHC RBC AUTO-ENTMCNC: 32.4 G/DL (ref 31.4–37.4)
MCV RBC AUTO: 92 FL (ref 82–98)
MONOCYTES # BLD AUTO: 0.65 THOUSAND/ΜL (ref 0.17–1.22)
MONOCYTES NFR BLD AUTO: 5 % (ref 4–12)
NEUTROPHILS # BLD AUTO: 9.22 THOUSANDS/ΜL (ref 1.85–7.62)
NEUTS SEG NFR BLD AUTO: 75 % (ref 43–75)
NRBC BLD AUTO-RTO: 0 /100 WBCS
NT-PROBNP SERPL-MCNC: 805 PG/ML
O2 CT BLDV-SCNC: 16.7 ML/DL
P AXIS: 73 DEGREES
PCO2 BLDV: 53.9 MM HG (ref 42–50)
PH BLDV: 7.4 [PH] (ref 7.3–7.4)
PLATELET # BLD AUTO: 379 THOUSANDS/UL (ref 149–390)
PMV BLD AUTO: 10.4 FL (ref 8.9–12.7)
PO2 BLDV: 67.5 MM HG (ref 35–45)
POTASSIUM SERPL-SCNC: 3.2 MMOL/L (ref 3.5–5.3)
PROCALCITONIN SERPL-MCNC: 0.06 NG/ML
PROT SERPL-MCNC: 7.4 G/DL (ref 6.4–8.2)
QRS AXIS: 79 DEGREES
QRSD INTERVAL: 72 MS
QT INTERVAL: 320 MS
QTC INTERVAL: 433 MS
RBC # BLD AUTO: 4.15 MILLION/UL (ref 3.88–5.62)
RSV RNA RESP QL NAA+PROBE: NEGATIVE
SARS-COV-2 RNA RESP QL NAA+PROBE: NEGATIVE
SODIUM SERPL-SCNC: 141 MMOL/L (ref 136–145)
T WAVE AXIS: 87 DEGREES
VENTRICULAR RATE: 110 BPM
WBC # BLD AUTO: 12.31 THOUSAND/UL (ref 4.31–10.16)

## 2022-03-01 PROCEDURE — 71045 X-RAY EXAM CHEST 1 VIEW: CPT

## 2022-03-01 PROCEDURE — 83880 ASSAY OF NATRIURETIC PEPTIDE: CPT | Performed by: EMERGENCY MEDICINE

## 2022-03-01 PROCEDURE — 80053 COMPREHEN METABOLIC PANEL: CPT | Performed by: EMERGENCY MEDICINE

## 2022-03-01 PROCEDURE — 82805 BLOOD GASES W/O2 SATURATION: CPT | Performed by: EMERGENCY MEDICINE

## 2022-03-01 PROCEDURE — 93005 ELECTROCARDIOGRAM TRACING: CPT

## 2022-03-01 PROCEDURE — 36415 COLL VENOUS BLD VENIPUNCTURE: CPT | Performed by: EMERGENCY MEDICINE

## 2022-03-01 PROCEDURE — 84484 ASSAY OF TROPONIN QUANT: CPT | Performed by: EMERGENCY MEDICINE

## 2022-03-01 PROCEDURE — 99220 PR INITIAL OBSERVATION CARE/DAY 70 MINUTES: CPT | Performed by: FAMILY MEDICINE

## 2022-03-01 PROCEDURE — 84145 PROCALCITONIN (PCT): CPT | Performed by: FAMILY MEDICINE

## 2022-03-01 PROCEDURE — 96374 THER/PROPH/DIAG INJ IV PUSH: CPT

## 2022-03-01 PROCEDURE — 93010 ELECTROCARDIOGRAM REPORT: CPT | Performed by: INTERNAL MEDICINE

## 2022-03-01 PROCEDURE — 82948 REAGENT STRIP/BLOOD GLUCOSE: CPT

## 2022-03-01 PROCEDURE — 94760 N-INVAS EAR/PLS OXIMETRY 1: CPT

## 2022-03-01 PROCEDURE — 99291 CRITICAL CARE FIRST HOUR: CPT | Performed by: EMERGENCY MEDICINE

## 2022-03-01 PROCEDURE — 96375 TX/PRO/DX INJ NEW DRUG ADDON: CPT

## 2022-03-01 PROCEDURE — 94640 AIRWAY INHALATION TREATMENT: CPT

## 2022-03-01 PROCEDURE — 99285 EMERGENCY DEPT VISIT HI MDM: CPT

## 2022-03-01 PROCEDURE — 0241U HB NFCT DS VIR RESP RNA 4 TRGT: CPT | Performed by: FAMILY MEDICINE

## 2022-03-01 PROCEDURE — 85025 COMPLETE CBC W/AUTO DIFF WBC: CPT | Performed by: EMERGENCY MEDICINE

## 2022-03-01 RX ORDER — SODIUM CHLORIDE FOR INHALATION 0.9 %
3 VIAL, NEBULIZER (ML) INHALATION
Status: DISCONTINUED | OUTPATIENT
Start: 2022-03-01 | End: 2022-03-05

## 2022-03-01 RX ORDER — METOPROLOL SUCCINATE 50 MG/1
50 TABLET, EXTENDED RELEASE ORAL DAILY
Status: DISCONTINUED | OUTPATIENT
Start: 2022-03-01 | End: 2022-03-05

## 2022-03-01 RX ORDER — LEVALBUTEROL 1.25 MG/.5ML
1.25 SOLUTION, CONCENTRATE RESPIRATORY (INHALATION) 3 TIMES DAILY
Status: DISCONTINUED | OUTPATIENT
Start: 2022-03-01 | End: 2022-03-01

## 2022-03-01 RX ORDER — GUAIFENESIN 600 MG
600 TABLET, EXTENDED RELEASE 12 HR ORAL 2 TIMES DAILY
Status: DISCONTINUED | OUTPATIENT
Start: 2022-03-01 | End: 2022-03-10 | Stop reason: HOSPADM

## 2022-03-01 RX ORDER — ATORVASTATIN CALCIUM 10 MG/1
10 TABLET, FILM COATED ORAL DAILY
Status: DISCONTINUED | OUTPATIENT
Start: 2022-03-01 | End: 2022-03-10 | Stop reason: HOSPADM

## 2022-03-01 RX ORDER — ONDANSETRON 2 MG/ML
4 INJECTION INTRAMUSCULAR; INTRAVENOUS EVERY 6 HOURS PRN
Status: DISCONTINUED | OUTPATIENT
Start: 2022-03-01 | End: 2022-03-10 | Stop reason: HOSPADM

## 2022-03-01 RX ORDER — METHYLPREDNISOLONE SODIUM SUCCINATE 125 MG/2ML
125 INJECTION, POWDER, LYOPHILIZED, FOR SOLUTION INTRAMUSCULAR; INTRAVENOUS ONCE
Status: COMPLETED | OUTPATIENT
Start: 2022-03-01 | End: 2022-03-01

## 2022-03-01 RX ORDER — FUROSEMIDE 10 MG/ML
40 INJECTION INTRAMUSCULAR; INTRAVENOUS ONCE
Status: COMPLETED | OUTPATIENT
Start: 2022-03-01 | End: 2022-03-01

## 2022-03-01 RX ORDER — SODIUM CHLORIDE FOR INHALATION 0.9 %
12 VIAL, NEBULIZER (ML) INHALATION ONCE
Status: COMPLETED | OUTPATIENT
Start: 2022-03-01 | End: 2022-03-01

## 2022-03-01 RX ORDER — ACETAMINOPHEN 325 MG/1
650 TABLET ORAL EVERY 6 HOURS PRN
Status: DISCONTINUED | OUTPATIENT
Start: 2022-03-01 | End: 2022-03-10 | Stop reason: HOSPADM

## 2022-03-01 RX ORDER — ALBUTEROL SULFATE 90 UG/1
2 AEROSOL, METERED RESPIRATORY (INHALATION) EVERY 4 HOURS PRN
Status: DISCONTINUED | OUTPATIENT
Start: 2022-03-01 | End: 2022-03-10 | Stop reason: HOSPADM

## 2022-03-01 RX ORDER — FUROSEMIDE 10 MG/ML
60 INJECTION INTRAMUSCULAR; INTRAVENOUS
Status: DISCONTINUED | OUTPATIENT
Start: 2022-03-02 | End: 2022-03-04

## 2022-03-01 RX ORDER — FUROSEMIDE 10 MG/ML
60 INJECTION INTRAMUSCULAR; INTRAVENOUS
Status: DISCONTINUED | OUTPATIENT
Start: 2022-03-02 | End: 2022-03-01

## 2022-03-01 RX ORDER — MONTELUKAST SODIUM 10 MG/1
10 TABLET ORAL
Status: DISCONTINUED | OUTPATIENT
Start: 2022-03-01 | End: 2022-03-10 | Stop reason: HOSPADM

## 2022-03-01 RX ORDER — OXYCODONE HYDROCHLORIDE AND ACETAMINOPHEN 5; 325 MG/1; MG/1
1 TABLET ORAL EVERY 4 HOURS PRN
Status: DISCONTINUED | OUTPATIENT
Start: 2022-03-01 | End: 2022-03-10 | Stop reason: HOSPADM

## 2022-03-01 RX ORDER — MAGNESIUM HYDROXIDE/ALUMINUM HYDROXICE/SIMETHICONE 120; 1200; 1200 MG/30ML; MG/30ML; MG/30ML
30 SUSPENSION ORAL EVERY 6 HOURS PRN
Status: DISCONTINUED | OUTPATIENT
Start: 2022-03-01 | End: 2022-03-10 | Stop reason: HOSPADM

## 2022-03-01 RX ORDER — BUDESONIDE AND FORMOTEROL FUMARATE DIHYDRATE 160; 4.5 UG/1; UG/1
2 AEROSOL RESPIRATORY (INHALATION) 2 TIMES DAILY
Status: DISCONTINUED | OUTPATIENT
Start: 2022-03-01 | End: 2022-03-10 | Stop reason: HOSPADM

## 2022-03-01 RX ORDER — POTASSIUM CHLORIDE 20 MEQ/1
20 TABLET, EXTENDED RELEASE ORAL 2 TIMES DAILY
Status: DISCONTINUED | OUTPATIENT
Start: 2022-03-01 | End: 2022-03-10 | Stop reason: HOSPADM

## 2022-03-01 RX ORDER — POTASSIUM CHLORIDE 20 MEQ/1
40 TABLET, EXTENDED RELEASE ORAL ONCE
Status: COMPLETED | OUTPATIENT
Start: 2022-03-01 | End: 2022-03-01

## 2022-03-01 RX ORDER — METHYLPREDNISOLONE SODIUM SUCCINATE 40 MG/ML
40 INJECTION, POWDER, LYOPHILIZED, FOR SOLUTION INTRAMUSCULAR; INTRAVENOUS EVERY 12 HOURS SCHEDULED
Status: DISCONTINUED | OUTPATIENT
Start: 2022-03-02 | End: 2022-03-05

## 2022-03-01 RX ORDER — DOCUSATE SODIUM 100 MG/1
100 CAPSULE, LIQUID FILLED ORAL 2 TIMES DAILY
Status: DISCONTINUED | OUTPATIENT
Start: 2022-03-01 | End: 2022-03-10 | Stop reason: HOSPADM

## 2022-03-01 RX ORDER — LEVALBUTEROL 1.25 MG/.5ML
1.25 SOLUTION, CONCENTRATE RESPIRATORY (INHALATION)
Status: DISCONTINUED | OUTPATIENT
Start: 2022-03-01 | End: 2022-03-05

## 2022-03-01 RX ADMIN — APIXABAN 5 MG: 5 TABLET, FILM COATED ORAL at 20:18

## 2022-03-01 RX ADMIN — GUAIFENESIN 600 MG: 600 TABLET, EXTENDED RELEASE ORAL at 13:11

## 2022-03-01 RX ADMIN — POTASSIUM CHLORIDE 40 MEQ: 1500 TABLET, EXTENDED RELEASE ORAL at 07:49

## 2022-03-01 RX ADMIN — ATORVASTATIN CALCIUM 10 MG: 10 TABLET, FILM COATED ORAL at 13:11

## 2022-03-01 RX ADMIN — LEVALBUTEROL HYDROCHLORIDE 1.25 MG: 1.25 SOLUTION, CONCENTRATE RESPIRATORY (INHALATION) at 19:26

## 2022-03-01 RX ADMIN — APIXABAN 5 MG: 5 TABLET, FILM COATED ORAL at 13:11

## 2022-03-01 RX ADMIN — METHYLPREDNISOLONE SODIUM SUCCINATE 125 MG: 125 INJECTION, POWDER, FOR SOLUTION INTRAMUSCULAR; INTRAVENOUS at 07:50

## 2022-03-01 RX ADMIN — DOCUSATE SODIUM 100 MG: 100 CAPSULE ORAL at 13:11

## 2022-03-01 RX ADMIN — INSULIN LISPRO 5 UNITS: 100 INJECTION, SOLUTION INTRAVENOUS; SUBCUTANEOUS at 22:22

## 2022-03-01 RX ADMIN — TIOTROPIUM BROMIDE 18 MCG: 18 CAPSULE ORAL; RESPIRATORY (INHALATION) at 16:39

## 2022-03-01 RX ADMIN — METOPROLOL SUCCINATE 50 MG: 50 TABLET, EXTENDED RELEASE ORAL at 13:11

## 2022-03-01 RX ADMIN — ALBUTEROL SULFATE 10 MG: 2.5 SOLUTION RESPIRATORY (INHALATION) at 06:41

## 2022-03-01 RX ADMIN — MONTELUKAST 10 MG: 10 TABLET, FILM COATED ORAL at 22:26

## 2022-03-01 RX ADMIN — ISODIUM CHLORIDE 3 ML: 0.03 SOLUTION RESPIRATORY (INHALATION) at 19:26

## 2022-03-01 RX ADMIN — BUDESONIDE AND FORMOTEROL FUMARATE DIHYDRATE 2 PUFF: 160; 4.5 AEROSOL RESPIRATORY (INHALATION) at 16:38

## 2022-03-01 RX ADMIN — FUROSEMIDE 40 MG: 10 INJECTION, SOLUTION INTRAMUSCULAR; INTRAVENOUS at 23:20

## 2022-03-01 RX ADMIN — FUROSEMIDE 40 MG: 10 INJECTION, SOLUTION INTRAVENOUS at 07:51

## 2022-03-01 RX ADMIN — POTASSIUM CHLORIDE 20 MEQ: 1500 TABLET, EXTENDED RELEASE ORAL at 20:18

## 2022-03-01 RX ADMIN — FUROSEMIDE 60 MG: 40 TABLET ORAL at 13:11

## 2022-03-01 RX ADMIN — ACETAMINOPHEN 650 MG: 325 TABLET ORAL at 20:17

## 2022-03-01 RX ADMIN — IPRATROPIUM BROMIDE 1 MG: 0.5 SOLUTION RESPIRATORY (INHALATION) at 06:41

## 2022-03-01 RX ADMIN — POTASSIUM CHLORIDE 20 MEQ: 1500 TABLET, EXTENDED RELEASE ORAL at 13:11

## 2022-03-01 RX ADMIN — ISODIUM CHLORIDE 12 ML: 0.03 SOLUTION RESPIRATORY (INHALATION) at 06:41

## 2022-03-01 RX ADMIN — INSULIN LISPRO 5 UNITS: 100 INJECTION, SOLUTION INTRAVENOUS; SUBCUTANEOUS at 16:37

## 2022-03-01 NOTE — ED ATTENDING ATTESTATION
3/1/2022  IGodwin MD, saw and evaluated the patient  I have discussed the patient with the resident/non-physician practitioner and agree with the resident's/non-physician practitioner's findings, Plan of Care, and MDM as documented in the resident's/non-physician practitioner's note, except where noted  All available labs and Radiology studies were reviewed  I was present for key portions of any procedure(s) performed by the resident/non-physician practitioner and I was immediately available to provide assistance  At this point I agree with the current assessment done in the Emergency Department  I have conducted an independent evaluation of this patient a history and physical is as follows:    ED Course      Emergency Department Note- Amber Haro 49 y o  male MRN: [id number]    Unit/Bed#: ED 10 Encounter: [id number]    Amber Haro is a 49 y o  male who presents with   Chief Complaint   Patient presents with    Shortness of Breath     felt SOB used albuterol didn't seem to work, hx of CHF and new dx of a-fib, reports feeling dizzy when standing, found in the 80s per EMS, doesn't wear 02 at home          History of Present Illness   HPI:  Amber Haro is a 49 y o  male who presents for evaluation of: Worsening dyspnea over the last several days  The patient has a history of congestive heart failure and asthma  The patient believes he is having a flare of his congestive heart failure  He recently took an extra dose of his home furosemide  He does not wear oxygen chronically  He called EMS this morning because he had dyspnea at rest   Any sort of activity provokes his dyspnea  He was noted to be hypoxic prior to administration of oxygen  He has a history of atrial fibrillation and has been anticoagulated in the past but ran out of his anticoagulants 3 days ago  Review of Systems   Constitutional: Negative for chills and fever  HENT: Negative for congestion and rhinorrhea  Respiratory: Positive for cough, shortness of breath and wheezing  Cardiovascular: Positive for leg swelling  Negative for chest pain and palpitations  Gastrointestinal: Negative for nausea and vomiting  Neurological: Negative for light-headedness and headaches  All other systems reviewed and are negative  Historical Information   Past Medical History:   Diagnosis Date    Acute gastritis without hemorrhage     last assessed 3/24/17    Asthma     Diabetes mellitus ([facility] [address] )     Gastric bypass status for obesity     Hyperlipidemia     Hypertension     Impaired fasting glucose     last assessed 5/10/17    Obesity     Viral gastroenteritis     last assessed 11/4/16     Past Surgical History:   Procedure Laterality Date    CARPAL TUNNEL RELEASE      bilateral    GASTRIC BYPASS  2004    with han en y    HERNIA REPAIR      ventral inscisional    TONSILLECTOMY       Social History   Social History     Substance and Sexual Activity   Alcohol Use Yes    Alcohol/week: 1 0 standard drink    Types: 1 Standard drinks or equivalent per week    Comment: social     Social History     Substance and Sexual Activity   Drug Use No     Social History     Tobacco Use   Smoking Status Light Tobacco Smoker    Types: Cigars   Smokeless Tobacco Current User   Tobacco Comment    rare/hasn't smoked in 1 year     Family History:   Family History   Problem Relation Age of Onset    Stroke Mother     Hypertension Father        Meds/Allergies   PTA meds:   Prior to Admission Medications   Prescriptions Last Dose Informant Patient Reported? Taking?    Accu-Chek FastClix Lancets MISC  Self No No   Sig: Test blood sugar twice daily   Blood Glucose Monitoring Suppl (Accu-Chek Guide) w/Device KIT  Self No No   Sig: Use 2 (two) times a day Test blood sugar twice daily   MULTIPLE VITAMINS-CALCIUM PO  Self Yes No   Sig: Take 1 tablet by mouth daily   albuterol (PROVENTIL HFA,VENTOLIN HFA) 90 mcg/act inhaler   No No   Sig: Inhale 2 puffs every 4 (four) hours as needed for wheezing or shortness of breath   apixaban (Eliquis) 5 mg   No No   Sig: Take 1 tablet (5 mg total) by mouth 2 (two) times a day   atorvastatin (LIPITOR) 10 mg tablet  Self No No   Sig: TAKE 1 TABLET BY MOUTH EVERY DAY   budesonide-formoterol (Symbicort) 160-4 5 mcg/act inhaler   No No   Sig: Inhale 2 puffs 2 (two) times a day Rinse mouth after use     glucose blood (Accu-Chek Guide) test strip  Self No No   Sig: Test blood sugar twice daily   guaiFENesin (MUCINEX) 600 mg 12 hr tablet  Self No No   Sig: Take 1 tablet (600 mg total) by mouth 2 (two) times a day   levalbuterol (Xopenex) 1 25 mg/3 mL nebulizer solution  Self No No   Sig: Take 3 mL (1 25 mg total) by nebulization 3 (three) times a day   metFORMIN (GLUCOPHAGE) 500 mg tablet   No No   Sig: Take 1 tablet (500 mg total) by mouth 2 (two) times a day After meals   metoprolol succinate (TOPROL-XL) 25 mg 24 hr tablet   No No   Sig: Take 2 tablets (50 mg total) by mouth daily   montelukast (SINGULAIR) 10 mg tablet   No No   Sig: Take 1 tablet (10 mg total) by mouth daily at bedtime   potassium chloride (K-DUR,KLOR-CON) 10 mEq tablet   No No   Sig: Take 2 tablets (20 mEq total) by mouth 2 (two) times a day   sitaGLIPtin (Januvia) 100 mg tablet   No No   Sig: Take 1 tablet (100 mg total) by mouth daily   sodium chloride (OCEAN) 0 65 % nasal spray  Self No No   Si spray into each nostril 3 (three) times a day   tiotropium (Spiriva Respimat) 1 25 MCG/ACT AERS inhaler   No No   Sig: Inhale 2 puffs daily   tobramycin (TOBREX) 0 3 % SOLN   No No   Sig: Administer 1 drop to both eyes every 4 (four) hours while awake   torsemide (DEMADEX) 20 mg tablet   No No   Sig: Take 1 tablet (20 mg total) by mouth 2 (two) times a day      Facility-Administered Medications: None     Allergies   Allergen Reactions    Azithromycin Other (See Comments)     Shaky, uneasy feeling     Bactrim [Sulfamethoxazole-Trimethoprim] Other (See Comments)     shakiness       Objective   First Vitals:   Blood Pressure: 163/88 (03/01/22 0609)  Pulse: (!) 115 (03/01/22 0609)  Temperature: 97 5 °F (36 4 °C) (03/01/22 0609)  Temp Source: Oral (03/01/22 0609)  Respirations: (!) 25 (03/01/22 0609)  Height: 6' (182 9 cm) (03/01/22 0609)  Weight - Scale: (!) 143 kg (315 lb 4 1 oz) (03/01/22 0609)  SpO2: 99 % (03/01/22 0609)    Current Vitals:   Blood Pressure: 163/88 (03/01/22 0609)  Pulse: (!) 115 (03/01/22 0609)  Temperature: 97 5 °F (36 4 °C) (03/01/22 0609)  Temp Source: Oral (03/01/22 0609)  Respirations: (!) 25 (03/01/22 0609)  Height: 6' (182 9 cm) (03/01/22 6564)  Weight - Scale: (!) 143 kg (315 lb 4 1 oz) (03/01/22 0609)  SpO2: 99 % (03/01/22 0609)    No intake or output data in the 24 hours ending 03/01/22 0652    Invasive Devices  Report    Peripheral Intravenous Line            Peripheral IV 03/01/22 Left Antecubital <1 day                Physical Exam  Vitals and nursing note reviewed  Constitutional:       General: He is in acute distress (Mild respiratory distress)  Appearance: Normal appearance  He is well-developed  HENT:      Head: Normocephalic and atraumatic  Right Ear: External ear normal       Left Ear: External ear normal       Nose: Nose normal       Mouth/Throat:      Pharynx: No oropharyngeal exudate  Eyes:      Conjunctiva/sclera: Conjunctivae normal       Pupils: Pupils are equal, round, and reactive to light  Cardiovascular:      Rate and Rhythm: Normal rate and regular rhythm  Pulmonary:      Effort: Tachypnea and respiratory distress present  Breath sounds: Examination of the right-upper field reveals wheezing  Examination of the left-upper field reveals wheezing  Examination of the right-middle field reveals wheezing  Examination of the left-middle field reveals wheezing  Examination of the right-lower field reveals wheezing, rhonchi and rales   Examination of the left-lower field reveals wheezing, rhonchi and rales  Wheezing, rhonchi and rales present  Chest:      Chest wall: No mass or deformity  Abdominal:      General: Abdomen is flat  There is no distension  Musculoskeletal:         General: No deformity  Normal range of motion  Cervical back: Normal range of motion and neck supple  Right lower leg: Edema present  Left lower leg: Edema present  Skin:     General: Skin is warm and dry  Capillary Refill: Capillary refill takes less than 2 seconds  Findings: Erythema present  Comments: Patient has plaques of psoriasis scattered over his torso and extremities   Neurological:      General: No focal deficit present  Mental Status: He is alert and oriented to person, place, and time  Mental status is at baseline  Coordination: Coordination normal    Psychiatric:         Mood and Affect: Mood normal          Behavior: Behavior normal          Thought Content: Thought content normal          Judgment: Judgment normal            Medical Decision Makin  Acute respiratory distress secondary to hypoxia secondary to congestive heart failure:  Patient is currently getting an albuterol ipratropium nebulization therapy treatment; plan to administer furosemide intravenously      Recent Results (from the past 36 hour(s))   CBC and differential    Collection Time: 22  6:15 AM   Result Value Ref Range    WBC 12 31 (H) 4 31 - 10 16 Thousand/uL    RBC 4 15 3 88 - 5 62 Million/uL    Hemoglobin 12 4 12 0 - 17 0 g/dL    Hematocrit 38 3 36 5 - 49 3 %    MCV 92 82 - 98 fL    MCH 29 9 26 8 - 34 3 pg    MCHC 32 4 31 4 - 37 4 g/dL    RDW 15 1 11 6 - 15 1 %    MPV 10 4 8 9 - 12 7 fL    Platelets 913 417 - 729 Thousands/uL    nRBC 0 /100 WBCs    Neutrophils Relative 75 43 - 75 %    Immat GRANS % 1 0 - 2 %    Lymphocytes Relative 10 (L) 14 - 44 %    Monocytes Relative 5 4 - 12 %    Eosinophils Relative 8 (H) 0 - 6 %    Basophils Relative 1 0 - 1 %    Neutrophils Absolute 9 22 (H) 1 85 - 7 62 Thousands/µL    Immature Grans Absolute 0 09 0 00 - 0 20 Thousand/uL    Lymphocytes Absolute 1 27 0 60 - 4 47 Thousands/µL    Monocytes Absolute 0 65 0 17 - 1 22 Thousand/µL    Eosinophils Absolute 0 99 (H) 0 00 - 0 61 Thousand/µL    Basophils Absolute 0 09 0 00 - 0 10 Thousands/µL     XR chest 1 view portable    (Results Pending)         Portions of the record may have been created with voice recognition software  Occasional wrong word or "sound a like" substitutions may have occurred due to the inherent limitations of voice recognition software  Read the chart carefully and recognize, using context, where substitutions have occurred  Critical Care Time  CriticalCare Time  Performed by: Pamela Hahn MD  Authorized by: Pamela Hahn MD     Critical care provider statement:     Critical care time (minutes):  32    Critical care time was exclusive of:  Separately billable procedures and treating other patients and teaching time    Critical care was necessary to treat or prevent imminent or life-threatening deterioration of the following conditions:  Cardiac failure    Critical care was time spent personally by me on the following activities:  Obtaining history from patient or surrogate, development of treatment plan with patient or surrogate, discussions with consultants, evaluation of patient's response to treatment and examination of patient    I assumed direction of critical care for this patient from another provider in my specialty: no    Comments:      Acute hypoxia; furosemide IV; albuterol ipratropium neb; patient is responding to oxygen

## 2022-03-01 NOTE — ASSESSMENT & PLAN NOTE
Wt Readings from Last 3 Encounters:   03/01/22 (!) 143 kg (315 lb 4 1 oz)   02/22/22 (!) 143 kg (315 lb 1 6 oz)   02/12/22 (!) 145 kg (319 lb)     Multiple recent admission with CHF exacerbation  Patient on outpatient torsemide 40 mg b i d  Which was increased recently  Presented with asthma exacerbation, worsening pedal edema bilaterally  Daily weight, intake and output  Salt restriction and fluid restriction  Start Lasix IV 60 mg b i d

## 2022-03-01 NOTE — RESPIRATORY THERAPY NOTE
RT Protocol Note  Carli Henriquez 49 y o  male MRN: [id number]  Unit/Bed#: CW2 218-01 Encounter: [id number]    Assessment    Principal Problem: Moderate asthma with acute exacerbation  Active Problems:    Essential hypertension    Type 2 diabetes mellitus with hyperglycemia (HCC)    Morbid obesity (HCC)    VICKY (obstructive sleep apnea)    Acute on chronic diastolic heart failure (HCC)    Paroxysmal atrial fibrillation (HCC)      Home Pulmonary Medications:  Albuterol prn udn        Past Medical History:   Diagnosis Date    Acute gastritis without hemorrhage     last assessed 3/24/17    Asthma     [facility] [address]     Gastric bypass status for obesity     Hyperlipidemia     Hypertension     Impaired fasting glucose     last assessed 5/10/17    Obesity     Viral gastroenteritis     last assessed 11/4/16     Social History     Socioeconomic History    Marital status: /Civil Union     Spouse name: Not on file    Number of children: Not on file    Years of education: Not on file    Highest education level: Not on file   Occupational History    Not on file   Tobacco Use    Smoking status: Light Tobacco Smoker     Types: Cigars    Smokeless tobacco: Current User    Tobacco comment: rare/hasn't smoked in 1 year   Vaping Use    Vaping Use: Never used   Substance and Sexual Activity    Alcohol use:  Yes     Alcohol/week: 1 0 standard drink     Types: 1 Standard drinks or equivalent per week     Comment: social    Drug use: No    Sexual activity: Yes     Partners: Female     Birth control/protection: None   Other Topics Concern    Not on file   Social History Narrative    Not on file     Social Determinants of Health     Financial Resource Strain: Not on file   Food Insecurity: Not on file   Transportation Needs: Not on file   Physical Activity: Not on file   Stress: Not on file   Social Connections: Not on file   Intimate Partner Violence: Not on file   Housing Stability: Not on file Subjective         Objective    Physical Exam:   Assessment Type: (P) Assess only  General Appearance: (P) Awake  Respiratory Pattern: (P) Dyspnea at rest,Dyspnea with exertion,Tachypneic,Tripod position  Chest Assessment: (P) Chest expansion symmetrical  Bilateral Breath Sounds: (P) Diminished,Expiratory wheezes    Vitals:  Blood pressure 131/76, pulse 98, temperature (!) 97 4 °F (36 3 °C), resp  rate 18, height 6' (1 829 m), weight (!) 143 kg (316 lb 3 2 oz), SpO2 91 %  Imaging and other studies: I have personally reviewed pertinent reports  Plan    Respiratory Plan: Mild Distress pathway        Resp Comments: (P) Pt assessed at this time per resp protocol  Pt admitted for asthma exacerbation  Pt states he uses levalbuterol as needed at home however, has been experiencing more SOB  Will ordered scheduled nebs at this time for sob  bs diminished w/ slight wheeze  pt sating 92%  pt does wear cpap at this time - he states his was recallec and he does not usually like to wear ours   I

## 2022-03-01 NOTE — H&P
[address] [facility]  H&P- Drema Root 1965, 49 y o  male MRN: [id number]  Unit/Bed#: CW2 218-01 Encounter: [id number]  Primary Care Provider: Lizzie Britton MD   Date and time admitted to hospital: 3/1/2022  6:05 AM    * Moderate asthma with acute exacerbation  Assessment & Plan  Patient with history of moderate asthma presented to ER with complaint of increasing shortness of breath, hypoxia, diffuse wheezing  Patient received Solu-Medrol 125 mg IV in ER with some improvement  Continue home inhaler Symbicort, Spiriva  Continue Singulair  Albuterol p r n  Start Solu-Medrol 40 mg b i d , tapering down as able to    Acute on chronic diastolic heart failure [facility])  Assessment & Plan  Wt Readings from Last 3 Encounters:   03/01/22 (!) 143 kg (315 lb 4 1 oz)   02/22/22 (!) 143 kg (315 lb 1 6 oz)   02/12/22 (!) 145 kg (319 lb)     Multiple recent admission with CHF exacerbation  Patient on outpatient torsemide 40 mg b i d  Which was increased recently  Presented with asthma exacerbation, worsening pedal edema bilaterally  Daily weight, intake and output  Salt restriction and fluid restriction  Start Lasix IV 60 mg b i d  Paroxysmal atrial fibrillation (HCC)  Assessment & Plan  Rate controlled on metoprolol  Anticoagulated on Eliquis    VICKY (obstructive sleep apnea)  Assessment & Plan  CPAP at bedtime    Morbid obesity (HCC)  Assessment & Plan  Lifestyle modification    Type 2 diabetes mellitus with hyperglycemia (HCC)  Assessment & Plan  Lab Results   Component Value Date    HGBA1C 7 0 (H) 11/10/2021       No results for input(s): POCGLU in the last 72 hours  Blood Sugar Average: Last 72 hrs:   at home patient is on oral antidiabetic medication which I will hold    Diabetic diet and sliding scale    Essential hypertension  Assessment & Plan  Blood pressure stable, continue home medication metoprolol  Also on IV Lasix    VTE Prophylaxis: Apixaban (Eliquis)  / sequential compression device   Code Status:  Full code      Anticipated Length of Stay:  Patient will be admitted on an Observation basis with an anticipated length of stay of  < 2 midnights  Justification for Hospital Stay:  Asthma exacerbation, CHF exacerbation    Total Time for Visit, including Counseling / Coordination of Care: 60 minutes  Greater than 50% of this total time spent on direct patient counseling and coordination of care  Chief Complaint:   Shortness of breath    History of Present Illness:    Johnnie Canales is a 49 y o  male with history of CHF on torsemide, moderate asthma, AFib on Eliquis, hypertension presented to ER with complaint of worsening shortness of breath that started 1 week ago as well as increasing pedal edema  Patient already discussed with cardiologist and his torsemide was increased to torsemide 40 mg b i d  Due to worsening short of breath  However it did not improve symptoms therefore he decided to come to ER  In ER, he was found to have diffuse wheezing, lower extremity edema  Troponin negative x2  ProBNP mildly elevated  Patient was started on IV Lasix and IV Solu-Medrol  Patient will be admitted for CHF and asthma exacerbation  Review of Systems:    Review of Systems   Constitutional: Negative for chills and fever  HENT: Negative for ear pain and sore throat  Eyes: Negative for pain and visual disturbance  Respiratory: Positive for chest tightness, shortness of breath and wheezing  Negative for cough  Cardiovascular: Positive for leg swelling  Negative for chest pain and palpitations  Gastrointestinal: Negative for abdominal pain and vomiting  Genitourinary: Negative for dysuria and hematuria  Musculoskeletal: Negative for arthralgias and back pain  Skin: Negative for color change and rash  Neurological: Negative for seizures and syncope  All other systems reviewed and are negative        Past Medical and Surgical History:     Past Medical History: Diagnosis Date    Acute gastritis without hemorrhage     last assessed 3/24/17    Asthma     Diabetes mellitus ([address] )     Gastric bypass status for obesity     Hyperlipidemia     Hypertension     Impaired fasting glucose     last assessed 5/10/17    Obesity     Viral gastroenteritis     last assessed 11/4/16       Past Surgical History:   Procedure Laterality Date    CARPAL TUNNEL RELEASE      bilateral    GASTRIC BYPASS  2004    with han en y   [id number]      ventral inscisional    TONSILLECTOMY         Meds/Allergies:    Prior to Admission medications    Medication Sig Start Date End Date Taking? Authorizing Provider   Accu-Chek FastClix Lancets MISC Test blood sugar twice daily 11/16/21   Garrison Dawson MD   albuterol (PROVENTIL HFA,VENTOLIN HFA) 90 mcg/act inhaler Inhale 2 puffs every 4 (four) hours as needed for wheezing or shortness of breath 2/28/22   Garrison Dawson MD   apixaban (Eliquis) 5 mg Take 1 tablet (5 mg total) by mouth 2 (two) times a day 2/12/22 3/14/22  Shawn Baker PA-C   atorvastatin (LIPITOR) 10 mg tablet TAKE 1 TABLET BY MOUTH EVERY DAY 9/10/21   Garrison Dawson MD   Blood Glucose Monitoring Suppl (Accu-Chek Guide) w/Device KIT Use 2 (two) times a day Test blood sugar twice daily 8/5/21   Garrison Dawson MD   budesonide-formoterol (Symbicort) 160-4 5 mcg/act inhaler Inhale 2 puffs 2 (two) times a day Rinse mouth after use   2/3/22   Garrison Dawson MD   glucose blood (Accu-Chek Guide) test strip Test blood sugar twice daily 9/29/21   Garrison Dawson MD   guaiFENesin (MUCINEX) 600 mg 12 hr tablet Take 1 tablet (600 mg total) by mouth 2 (two) times a day 11/15/21   RODRIGUE Ballesteros   levalbuterol (Xopenex) 1 25 mg/3 mL nebulizer solution Take 3 mL (1 25 mg total) by nebulization 3 (three) times a day 11/1/21   RODRIGUE Yap   metFORMIN (GLUCOPHAGE) 500 mg tablet Take 1 tablet (500 mg total) by mouth 2 (two) times a day After meals 2/28/22   Quiana Barry MD   metoprolol succinate (TOPROL-XL) 25 mg 24 hr tablet Take 2 tablets (50 mg total) by mouth daily 2/12/22   Ari Magaña PA-C   montelukast (SINGULAIR) 10 mg tablet Take 1 tablet (10 mg total) by mouth daily at bedtime 2/3/22 3/5/22  Quiana Barry MD   MULTIPLE VITAMINS-CALCIUM PO Take 1 tablet by mouth daily 2/23/16   Historical Provider, MD   potassium chloride (K-DUR,KLOR-CON) 10 mEq tablet Take 2 tablets (20 mEq total) by mouth 2 (two) times a day 2/3/22   Quiana Barry MD   sitaGLIPtin (Januvia) 100 mg tablet Take 1 tablet (100 mg total) by mouth daily 2/28/22   Quiana Barry MD   sodium chloride (OCEAN) 0 65 % nasal spray 1 spray into each nostril 3 (three) times a day 11/15/21   RODRIGUE Gaytan   tiotropium (Spiriva Respimat) 1 25 MCG/ACT AERS inhaler Inhale 2 puffs daily 2/3/22   Quiana Barry MD   tobramycin (TOBREX) 0 3 % SOLN Administer 1 drop to both eyes every 4 (four) hours while awake 2/18/22   Radhika Varghese MD   torsemide [facility]) 20 mg tablet Take 1 tablet (20 mg total) by mouth 2 (two) times a day 2/12/22 3/14/22  Ari Magaña PA-C     I have reviewed home medications using allscripts  Allergies:    Allergies   Allergen Reactions    Azithromycin Other (See Comments)     Shaky, uneasy feeling     Bactrim [Sulfamethoxazole-Trimethoprim] Other (See Comments)     shakiness       Social History:     Marital Status: /Civil Union      Social History     Substance and Sexual Activity   Alcohol Use Yes    Alcohol/week: 1 0 standard drink    Types: 1 Standard drinks or equivalent per week    Comment: social     Social History     Tobacco Use   Smoking Status Light Tobacco Smoker    Types: Cigars   Smokeless Tobacco Current User   Tobacco Comment    rare/hasn't smoked in 1 year     Social History     Substance and Sexual Activity   Drug Use No       Family History:    Family History   Problem Relation Age of Onset    Stroke Mother     Hypertension Father        Physical Exam:     Vitals:   Blood Pressure: 131/76 (03/01/22 1305)  Pulse: 98 (03/01/22 1305)  Temperature: (!) 97 4 °F (36 3 °C) (03/01/22 1305)  Temp Source: Oral (03/01/22 0609)  Respirations: 18 (03/01/22 1304)  Height: 6' (182 9 cm) (03/01/22 4010)  Weight - Scale: (!) 143 kg (315 lb 4 1 oz) (03/01/22 0609)  SpO2: 91 % (03/01/22 1305)    Physical Exam  Vitals and nursing note reviewed  Constitutional:       Appearance: He is well-developed  He is obese  HENT:      Head: Normocephalic and atraumatic  Eyes:      Conjunctiva/sclera: Conjunctivae normal    Cardiovascular:      Rate and Rhythm: Normal rate and regular rhythm  Heart sounds: No murmur heard  Pulmonary:      Effort: Pulmonary effort is normal  No respiratory distress  Breath sounds: Wheezing present  Abdominal:      Palpations: Abdomen is soft  Tenderness: There is no abdominal tenderness  Musculoskeletal:      Cervical back: Neck supple  Right lower leg: Edema present  Left lower leg: Edema present  Skin:     General: Skin is warm and dry  Neurological:      Mental Status: He is alert  Additional Data:     Lab Results: I have personally reviewed pertinent reports  Results from last 7 days   Lab Units 03/01/22  0615   WBC Thousand/uL 12 31*   HEMOGLOBIN g/dL 12 4   HEMATOCRIT % 38 3   PLATELETS Thousands/uL 379   NEUTROS PCT % 75   LYMPHS PCT % 10*   MONOS PCT % 5   EOS PCT % 8*     Results from last 7 days   Lab Units 03/01/22  0636   SODIUM mmol/L 141   POTASSIUM mmol/L 3 2*   CHLORIDE mmol/L 100   CO2 mmol/L 33*   BUN mg/dL 15   CREATININE mg/dL 1 41*   ANION GAP mmol/L 8   CALCIUM mg/dL 7 6*   ALBUMIN g/dL 2 7*   TOTAL BILIRUBIN mg/dL 0 80   ALK PHOS U/L 106   ALT U/L 13   AST U/L 12   GLUCOSE RANDOM mg/dL 118                       Imaging: I have personally reviewed pertinent reports        XR chest 1 view portable   Final Result by Gardenia Campbell, MD (03/01 4644)      No acute cardiopulmonary disease  Please refer to prior chest CT report for follow-up recommendations  Workstation performed: [id number]                  ** Please Note: This note has been constructed using a voice recognition system   **

## 2022-03-01 NOTE — ASSESSMENT & PLAN NOTE
Patient with history of moderate asthma presented to ER with complaint of increasing shortness of breath, hypoxia, diffuse wheezing  Patient received Solu-Medrol 125 mg IV in ER with some improvement  Continue home inhaler Symbicort, Spiriva  Continue Singulair  Albuterol p r n    Start Solu-Medrol 40 mg b i d , tapering down as able to

## 2022-03-01 NOTE — ED PROVIDER NOTES
History  Chief Complaint   Patient presents with    Shortness of Breath     felt SOB used albuterol didn't seem to work, hx of CHF and new dx of a-fib, reports feeling dizzy when standing, found in the 80s per EMS, doesn't wear 02 at home      57-year-old male with history of CHF, asthma, AFib on Eliquis, hypertension, hyperlipidemia, diabetes, CKD who presents for evaluation of shortness of breath  Patient reports that he has developed gradual onset of shortness of breath over the past week  He has noted increased swelling in his lower extremities as well  This feels similar to previous CHF exacerbations  He discussed with his cardiologist and doubled his dose of torsemide to 80 mg daily, however, he has had no improvement  He has also been using his albuterol inhaler at home without relief  He has had a cough productive of clear sputum  He has had intermittent chest pain as well  He also endorses dyspnea on exertion  He denies any fevers, abdominal pain, nausea/vomiting, diaphoresis  Prior to Admission Medications   Prescriptions Last Dose Informant Patient Reported? Taking? Accu-Chek FastClix Lancets MISC  Self No No   Sig: Test blood sugar twice daily   Blood Glucose Monitoring Suppl (Accu-Chek Guide) w/Device KIT  Self No No   Sig: Use 2 (two) times a day Test blood sugar twice daily   MULTIPLE VITAMINS-CALCIUM PO  Self Yes No   Sig: Take 1 tablet by mouth daily   albuterol (PROVENTIL HFA,VENTOLIN HFA) 90 mcg/act inhaler   No No   Sig: Inhale 2 puffs every 4 (four) hours as needed for wheezing or shortness of breath   apixaban (Eliquis) 5 mg   No No   Sig: Take 1 tablet (5 mg total) by mouth 2 (two) times a day   atorvastatin (LIPITOR) 10 mg tablet  Self No No   Sig: TAKE 1 TABLET BY MOUTH EVERY DAY   budesonide-formoterol (Symbicort) 160-4 5 mcg/act inhaler   No No   Sig: Inhale 2 puffs 2 (two) times a day Rinse mouth after use     glucose blood (Accu-Chek Guide) test strip  Self No No Sig: Test blood sugar twice daily   guaiFENesin (MUCINEX) 600 mg 12 hr tablet  Self No No   Sig: Take 1 tablet (600 mg total) by mouth 2 (two) times a day   levalbuterol (Xopenex) 1 25 mg/3 mL nebulizer solution  Self No No   Sig: Take 3 mL (1 25 mg total) by nebulization 3 (three) times a day   metFORMIN (GLUCOPHAGE) 500 mg tablet   No No   Sig: Take 1 tablet (500 mg total) by mouth 2 (two) times a day After meals   metoprolol succinate (TOPROL-XL) 25 mg 24 hr tablet   No No   Sig: Take 2 tablets (50 mg total) by mouth daily   montelukast (SINGULAIR) 10 mg tablet   No No   Sig: Take 1 tablet (10 mg total) by mouth daily at bedtime   potassium chloride (K-DUR,KLOR-CON) 10 mEq tablet   No No   Sig: Take 2 tablets (20 mEq total) by mouth 2 (two) times a day   sitaGLIPtin (Januvia) 100 mg tablet   No No   Sig: Take 1 tablet (100 mg total) by mouth daily   sodium chloride (OCEAN) 0 65 % nasal spray  Self No No   Si spray into each nostril 3 (three) times a day   tiotropium (Spiriva Respimat) 1 25 MCG/ACT AERS inhaler   No No   Sig: Inhale 2 puffs daily   tobramycin (TOBREX) 0 3 % SOLN   No No   Sig: Administer 1 drop to both eyes every 4 (four) hours while awake   torsemide (DEMADEX) 20 mg tablet   No No   Sig: Take 1 tablet (20 mg total) by mouth 2 (two) times a day      Facility-Administered Medications: None       Past Medical History:   Diagnosis Date    Acute gastritis without hemorrhage     last assessed 3/24/17    Asthma     Diabetes mellitus ([facility] [address] )     Gastric bypass status for obesity     Hyperlipidemia     Hypertension     Impaired fasting glucose     last assessed 5/10/17    Obesity     Viral gastroenteritis     last assessed 16       Past Surgical History:   Procedure Laterality Date    CARPAL TUNNEL RELEASE      bilateral    GASTRIC BYPASS      with han en y    HERNIA REPAIR      ventral inscisional    TONSILLECTOMY         Family History   Problem Relation Age of Onset    Stroke Mother     Hypertension Father      I have reviewed and agree with the history as documented  E-Cigarette/Vaping    E-Cigarette Use Never User      E-Cigarette/Vaping Substances    Nicotine No     THC No     CBD No     Flavoring No     Other No     Unknown No      Social History     Tobacco Use    Smoking status: Light Tobacco Smoker     Types: Cigars    Smokeless tobacco: Current User    Tobacco comment: rare/hasn't smoked in 1 year   Vaping Use    Vaping Use: Never used   Substance Use Topics    Alcohol use: Yes     Alcohol/week: 1 0 standard drink     Types: 1 Standard drinks or equivalent per week     Comment: social    Drug use: No        Review of Systems   Constitutional: Negative for chills and fever  Eyes: Negative for visual disturbance  Respiratory: Positive for cough, shortness of breath and wheezing  Negative for chest tightness  Cardiovascular: Positive for chest pain  Negative for leg swelling  Gastrointestinal: Negative for abdominal distention, abdominal pain, diarrhea, nausea and vomiting  Genitourinary: Negative for flank pain  Musculoskeletal: Negative for back pain and gait problem  Skin: Negative for pallor and rash  Neurological: Negative for syncope, weakness, light-headedness and headaches  All other systems reviewed and are negative  Physical Exam  ED Triage Vitals [03/01/22 0609]   Temperature Pulse Respirations Blood Pressure SpO2   97 5 °F (36 4 °C) (!) 115 (!) 25 163/88 99 %      Temp Source Heart Rate Source Patient Position - Orthostatic VS BP Location FiO2 (%)   Oral Monitor Sitting Right arm --      Pain Score       No Pain             Orthostatic Vital Signs  Vitals:    03/01/22 0843 03/01/22 1304 03/01/22 1305 03/01/22 1621   BP: 141/67 131/76 131/76 129/74   Pulse: 103 97 98 88   Patient Position - Orthostatic VS:           Physical Exam  Vitals and nursing note reviewed     Constitutional:       General: He is not in acute distress  Appearance: He is ill-appearing  HENT:      Head: Normocephalic and atraumatic  Nose: Nose normal       Mouth/Throat:      Mouth: Mucous membranes are moist    Eyes:      Extraocular Movements: Extraocular movements intact  Pupils: Pupils are equal, round, and reactive to light  Cardiovascular:      Rate and Rhythm: Regular rhythm  Tachycardia present  Heart sounds: No murmur heard  No friction rub  No gallop  Pulmonary:      Effort: Pulmonary effort is normal       Breath sounds: Wheezing (Diffuse) present  No rhonchi or rales  Abdominal:      General: There is no distension  Palpations: Abdomen is soft  Tenderness: There is no abdominal tenderness  Musculoskeletal:         General: Normal range of motion  Cervical back: Normal range of motion and neck supple  Right lower leg: Edema present  Left lower leg: Edema present  Skin:     General: Skin is warm and dry  Findings: No rash  Neurological:      General: No focal deficit present  Mental Status: He is alert and oriented to person, place, and time     Psychiatric:         Behavior: Behavior normal          ED Medications  Medications   albuterol (PROVENTIL HFA,VENTOLIN HFA) inhaler 2 puff (has no administration in time range)   apixaban (ELIQUIS) tablet 5 mg (5 mg Oral Given 3/1/22 2018)   atorvastatin (LIPITOR) tablet 10 mg (10 mg Oral Given 3/1/22 1311)   budesonide-formoterol (SYMBICORT) 160-4 5 mcg/act inhaler 2 puff ( Inhalation Canceled Entry 3/1/22 1807)   guaiFENesin (MUCINEX) 12 hr tablet 600 mg ( Oral Canceled Entry 3/1/22 1809)   metoprolol succinate (TOPROL-XL) 24 hr tablet 50 mg (50 mg Oral Given 3/1/22 1311)   montelukast (SINGULAIR) tablet 10 mg (has no administration in time range)   potassium chloride (K-DUR,KLOR-CON) CR tablet 20 mEq (20 mEq Oral Given 3/1/22 2018)   tiotropium (SPIRIVA) capsule for inhaler 18 mcg (18 mcg Inhalation Given 3/1/22 1639) methylPREDNISolone sodium succinate (Solu-MEDROL) injection 40 mg (has no administration in time range)   acetaminophen (TYLENOL) tablet 650 mg (650 mg Oral Given 3/1/22 2017)   oxyCODONE-acetaminophen (PERCOCET) 5-325 mg per tablet 1 tablet (has no administration in time range)   aluminum-magnesium hydroxide-simethicone (MYLANTA) oral suspension 30 mL (has no administration in time range)   ondansetron (ZOFRAN) injection 4 mg (has no administration in time range)   docusate sodium (COLACE) capsule 100 mg ( Oral Canceled Entry 3/1/22 1808)   insulin lispro (HumaLOG) 100 units/mL subcutaneous injection 1-6 Units (5 Units Subcutaneous Given 3/1/22 1637)   insulin lispro (HumaLOG) 100 units/mL subcutaneous injection 1-6 Units (has no administration in time range)   levalbuterol (XOPENEX) inhalation solution 1 25 mg (1 25 mg Nebulization Given 3/1/22 1926)   sodium chloride 0 9 % inhalation solution 3 mL (3 mL Nebulization Given 3/1/22 1926)   furosemide (LASIX) injection 60 mg (has no administration in time range)   albuterol inhalation solution 10 mg (10 mg Nebulization Given 3/1/22 0641)   ipratropium (ATROVENT) 0 02 % inhalation solution 1 mg (1 mg Nebulization Given 3/1/22 0641)   sodium chloride 0 9 % inhalation solution 12 mL (12 mL Nebulization Given 3/1/22 0641)   furosemide (LASIX) injection 40 mg (40 mg Intravenous Given 3/1/22 0751)   potassium chloride (K-DUR,KLOR-CON) CR tablet 40 mEq (40 mEq Oral Given 3/1/22 0749)   methylPREDNISolone sodium succinate (Solu-MEDROL) injection 125 mg (125 mg Intravenous Given 3/1/22 0750)       Diagnostic Studies  Results Reviewed     Procedure Component Value Units Date/Time    Procalcitonin with AM Reflex [[id number]]  (Normal) Collected: 03/01/22 0636    Lab Status: Final result Specimen: Blood Updated: 03/01/22 1450     Procalcitonin 0 06 ng/ml     COVID/FLU/RSV [[id number]]  (Normal) Collected: 03/01/22 1128    Lab Status: Final result Specimen: Nares from Nose Updated: 03/01/22 1225     SARS-CoV-2 Negative     INFLUENZA A PCR Negative     INFLUENZA B PCR Negative     RSV PCR Negative    Narrative:      FOR PEDIATRIC PATIENTS - copy/paste COVID Guidelines URL to browser: [email or web address]    SARS-CoV-2 assay is a Nucleic Acid Amplification assay intended for the  qualitative detection of nucleic acid from SARS-CoV-2 in nasopharyngeal  swabs  Results are for the presumptive identification of SARS-CoV-2 RNA  Positive results are indicative of infection with SARS-CoV-2, the virus  causing COVID-19, but do not rule out bacterial infection or co-infection  with other viruses  Laboratories within the [place] and its  territories are required to report all positive results to the appropriate  public health authorities  Negative results do not preclude SARS-CoV-2  infection and should not be used as the sole basis for treatment or other  patient management decisions  Negative results must be combined with  clinical observations, patient history, and epidemiological information  This test has not been FDA cleared or approved  This test has been authorized by FDA under an Emergency Use Authorization  (EUA)  This test is only authorized for the duration of time the  declaration that circumstances exist justifying the authorization of the  emergency use of an in vitro diagnostic tests for detection of SARS-CoV-2  virus and/or diagnosis of COVID-19 infection under section 564(b)(1) of  the Act, 21 U  S C  074UVX-0(S)(1), unless the authorization is terminated  or revoked sooner  The test has been validated but independent review by FDA  and CLIA is pending  Test performed using [employer] Gene[employer]pert: This RT-PCR assay targets N2,  a region unique to SARS-CoV-2  A conserved region in the E-gene was chosen  for pan-Sarbecovirus detection which includes SARS-CoV-2      HS Troponin I 4hr [[id number]]  (Normal) Collected: 03/01/22 1128    Lab Status: Final result Specimen: Blood from Line, Venous Updated: 03/01/22 1205     hs TnI 4hr 16 ng/L      Delta 4hr hsTnI -2 ng/L     HS Troponin I 2hr [[id number]]  (Normal) Collected: 03/01/22 0902    Lab Status: Final result Specimen: Blood from Arm, Right Updated: 03/01/22 0935     hs TnI 2hr 16 ng/L      Delta 2hr hsTnI -2 ng/L     HS Troponin 0hr (reflex protocol) [[id number]]  (Normal) Collected: 03/01/22 0637    Lab Status: Final result Specimen: Blood Updated: 03/01/22 0701     hs TnI 0hr 18 ng/L     Blood gas, venous [[id number]]  (Abnormal) Collected: 03/01/22 0640    Lab Status: Final result Specimen: Blood from Arm, Right Updated: 03/01/22 0700     pH, Jordy 7 402     pCO2, Jordy 53 9 mm Hg      pO2, Jordy 67 5 mm Hg      HCO3, Jordy 32 8 mmol/L      Base Excess, Jordy 6 6 mmol/L      O2 Content, Jordy 16 7 ml/dL      O2 HGB, VENOUS 91 0 %     NT-BNP PRO [[id number]]  (Abnormal) Collected: 03/01/22 0636    Lab Status: Final result Specimen: Blood Updated: 03/01/22 0656     NT-proBNP 805 pg/mL     Comprehensive metabolic panel [[id number]]  (Abnormal) Collected: 03/01/22 0636    Lab Status: Final result Specimen: Blood Updated: 03/01/22 0655     Sodium 141 mmol/L      Potassium 3 2 mmol/L      Chloride 100 mmol/L      CO2 33 mmol/L      ANION GAP 8 mmol/L      BUN 15 mg/dL      Creatinine 1 41 mg/dL      Glucose 118 mg/dL      Calcium 7 6 mg/dL      Corrected Calcium 8 6 mg/dL      AST 12 U/L      ALT 13 U/L      Alkaline Phosphatase 106 U/L      Total Protein 7 4 g/dL      Albumin 2 7 g/dL      Total Bilirubin 0 80 mg/dL      eGFR 56 ml/min/1 73sq m     Narrative:      [facility] guidelines for Chronic Kidney Disease (CKD):     Stage 1 with normal or high GFR (GFR > 90 mL/min/1 73 square meters)    Stage 2 Mild CKD (GFR = 60-89 mL/min/1 73 square meters)    Stage 3A Moderate CKD (GFR = 45-59 mL/min/1 73 square meters)    Stage 3B Moderate CKD (GFR = 30-44 mL/min/1 73 square meters)    Stage 4 Severe CKD (GFR = 15-29 mL/min/1 73 square meters)    Stage 5 End Stage CKD (GFR <15 mL/min/1 73 square meters)  Note: GFR calculation is accurate only with a steady state creatinine    CBC and differential [[id number]]  (Abnormal) Collected: 03/01/22 0615    Lab Status: Final result Specimen: Blood Updated: 03/01/22 0637     WBC 12 31 Thousand/uL      RBC 4 15 Million/uL      Hemoglobin 12 4 g/dL      Hematocrit 38 3 %      MCV 92 fL      MCH 29 9 pg      MCHC 32 4 g/dL      RDW 15 1 %      MPV 10 4 fL      Platelets 916 Thousands/uL      nRBC 0 /100 WBCs      Neutrophils Relative 75 %      Immat GRANS % 1 %      Lymphocytes Relative 10 %      Monocytes Relative 5 %      Eosinophils Relative 8 %      Basophils Relative 1 %      Neutrophils Absolute 9 22 Thousands/µL      Immature Grans Absolute 0 09 Thousand/uL      Lymphocytes Absolute 1 27 Thousands/µL      Monocytes Absolute 0 65 Thousand/µL      Eosinophils Absolute 0 99 Thousand/µL      Basophils Absolute 0 09 Thousands/µL                  XR chest 1 view portable   Final Result by Sophia Palm MD (03/01 1208)      No acute cardiopulmonary disease  Please refer to prior chest CT report for follow-up recommendations                 Workstation performed: [id number]               Procedures  Procedures      ED Course  ED Course as of 03/01/22 2056   Tue Mar 01, 2022   0700 Procedure Note: EKG  Date/Time: 03/01/22 6:13 AM   Interpreted by: Joanna Moody  Indications / Diagnosis: SOB  ECG reviewed by me, the ED Provider: yes   The EKG demonstrates:  Rhythm: rate 110, sinus tachycardia  Intervals: normal intervals  Axis: normal axis  QRS/Blocks: normal QRS  ST Changes: No acute ST Changes, no STD/MADYSON                                           MDM  Number of Diagnoses or Management Options  Acute exacerbation of CHF (congestive heart failure) ([facility] [address] ): new and requires workup  Acute respiratory failure with hypoxia ([facility] [address] ): new and requires workup  Asthma exacerbation: new and requires workup  Diagnosis management comments: 30-year-old male who presents for evaluation of shortness of breath  Will obtain cardiac workup, chest x-ray  Given diffuse wheezing on exam, will treat initially with Nancy Rahman neb  Will also diurese with 40 mg IV Lasix as there is likely some element of CHF exacerbation as well  Signed out to Dr Marycarmen Chacon pending re-evaluation and admission  Amount and/or Complexity of Data Reviewed  Clinical lab tests: ordered and reviewed  Tests in the radiology section of CPT®: ordered and reviewed  Review and summarize past medical records: yes    Risk of Complications, Morbidity, and/or Mortality  Presenting problems: high  Diagnostic procedures: low  Management options: moderate    Patient Progress  Patient progress: stable      Disposition  Final diagnoses:   Acute exacerbation of CHF (congestive heart failure) ([facility] )   Asthma exacerbation   Acute respiratory failure with hypoxia ([place] [address] )     Time reflects when diagnosis was documented in both MDM as applicable and the Disposition within this note     Time User Action Codes Description Comment    3/1/2022  8:40 AM Ashely Garcia Add [I50 9] Acute exacerbation of CHF (congestive heart failure) ([facility] )     3/1/2022  8:40 AM Ashely Modest Add [J45 901] Asthma exacerbation     3/1/2022  8:40 AM Ashely Garcia Add [J96 01] Acute respiratory failure with hypoxia [facility])       ED Disposition     ED Disposition Condition Date/Time Comment    Admit Stable Tue Mar 1, 2022  8:40 AM Case was discussed with ADEN and the patient's admission status was agreed to be Admission Status: observation status to the service of Dr Kavya Howard           Follow-up Information    None         Current Discharge Medication List      CONTINUE these medications which have NOT CHANGED    Details   Accu-Chek FastClix Lancets MISC Test blood sugar twice daily  Qty: 200 each, Refills: 3    Associated Diagnoses: Type 2 diabetes mellitus with hyperglycemia, without long-term current use of insulin ([facility])      albuterol (PROVENTIL HFA,VENTOLIN HFA) 90 mcg/act inhaler Inhale 2 puffs every 4 (four) hours as needed for wheezing or shortness of breath  Qty: 8 g, Refills: 3    Comments: Substitution to a formulary equivalent within the same pharmaceutical class is authorized  Associated Diagnoses: Mild intermittent asthma without complication      apixaban (Eliquis) 5 mg Take 1 tablet (5 mg total) by mouth 2 (two) times a day  Qty: 60 tablet, Refills: 0    Associated Diagnoses: A-fib ([facility])      atorvastatin (LIPITOR) 10 mg tablet TAKE 1 TABLET BY MOUTH EVERY DAY  Qty: 30 tablet, Refills: 5    Associated Diagnoses: Mixed hyperlipidemia      Blood Glucose Monitoring Suppl (Accu-Chek Guide) w/Device KIT Use 2 (two) times a day Test blood sugar twice daily  Qty: 1 kit, Refills: 0    Associated Diagnoses: Type 2 diabetes mellitus with hyperglycemia, without long-term current use of insulin ([facility])      budesonide-formoterol (Symbicort) 160-4 5 mcg/act inhaler Inhale 2 puffs 2 (two) times a day Rinse mouth after use  Qty: 10 2 g, Refills: 2    Associated Diagnoses:  Moderate persistent asthma with acute exacerbation      glucose blood (Accu-Chek Guide) test strip Test blood sugar twice daily  Qty: 200 strip, Refills: 3    Associated Diagnoses: Type 2 diabetes mellitus with hyperglycemia, without long-term current use of insulin ([facility])      guaiFENesin (MUCINEX) 600 mg 12 hr tablet Take 1 tablet (600 mg total) by mouth 2 (two) times a day  Qty: 60 tablet, Refills: 0    Associated Diagnoses: Asthma exacerbation      levalbuterol (Xopenex) 1 25 mg/3 mL nebulizer solution Take 3 mL (1 25 mg total) by nebulization 3 (three) times a day  Qty: 270 mL, Refills: 3    Associated Diagnoses: Mild intermittent asthma without complication      metFORMIN (GLUCOPHAGE) 500 mg tablet Take 1 tablet (500 mg total) by mouth 2 (two) times a day After meals  Qty: 60 tablet, Refills: 6    Associated Diagnoses: Type 2 diabetes mellitus with hyperglycemia, without long-term current use of insulin ([facility])      metoprolol succinate (TOPROL-XL) 25 mg 24 hr tablet Take 2 tablets (50 mg total) by mouth daily  Qty: 20 tablet, Refills: 0    Associated Diagnoses: A-fib ([facility])      montelukast (SINGULAIR) 10 mg tablet Take 1 tablet (10 mg total) by mouth daily at bedtime  Qty: 90 tablet, Refills: 1    Associated Diagnoses: Moderate persistent asthma with acute exacerbation      MULTIPLE VITAMINS-CALCIUM PO Take 1 tablet by mouth daily      potassium chloride (K-DUR,KLOR-CON) 10 mEq tablet Take 2 tablets (20 mEq total) by mouth 2 (two) times a day  Qty: 360 tablet, Refills: 1    Associated Diagnoses: Hypokalemia      sitaGLIPtin (Januvia) 100 mg tablet Take 1 tablet (100 mg total) by mouth daily  Qty: 30 tablet, Refills: 1    Associated Diagnoses: Type 2 diabetes mellitus with hyperglycemia, without long-term current use of insulin ([facility])      sodium chloride (OCEAN) 0 65 % nasal spray 1 spray into each nostril 3 (three) times a day  Qty: 30 mL, Refills: 0    Associated Diagnoses: Asthma exacerbation      tiotropium (Spiriva Respimat) 1 25 MCG/ACT AERS inhaler Inhale 2 puffs daily  Qty: 4 g, Refills: 3    Associated Diagnoses: Moderate persistent asthma with acute exacerbation      tobramycin (TOBREX) 0 3 % SOLN Administer 1 drop to both eyes every 4 (four) hours while awake  Qty: 5 mL, Refills: 0    Associated Diagnoses: Acute bacterial conjunctivitis of both eyes      torsemide (DEMADEX) 20 mg tablet Take 1 tablet (20 mg total) by mouth 2 (two) times a day  Qty: 60 tablet, Refills: 0    Associated Diagnoses: Acute on chronic diastolic heart failure (HCC)           No discharge procedures on file  PDMP Review       Value Time User    PDMP Reviewed  Yes 9/23/2021 12:07 AM Eliane Damon DO           ED Provider  Attending physically available and evaluated Darrell Drake I managed the patient along with the ED Attending      Electronically Signed by         Jay Urban MD  03/01/22 7352

## 2022-03-01 NOTE — ASSESSMENT & PLAN NOTE
Lab Results   Component Value Date    HGBA1C 7 0 (H) 11/10/2021       No results for input(s): POCGLU in the last 72 hours  Blood Sugar Average: Last 72 hrs:   at home patient is on oral antidiabetic medication which I will hold    Diabetic diet and sliding scale

## 2022-03-02 LAB
ANION GAP SERPL CALCULATED.3IONS-SCNC: 6 MMOL/L (ref 4–13)
BASOPHILS # BLD AUTO: 0.02 THOUSANDS/ΜL (ref 0–0.1)
BASOPHILS NFR BLD AUTO: 0 % (ref 0–1)
BUN SERPL-MCNC: 24 MG/DL (ref 5–25)
CALCIUM SERPL-MCNC: 7.2 MG/DL (ref 8.3–10.1)
CHLORIDE SERPL-SCNC: 99 MMOL/L (ref 100–108)
CO2 SERPL-SCNC: 30 MMOL/L (ref 21–32)
CREAT SERPL-MCNC: 1.47 MG/DL (ref 0.6–1.3)
EOSINOPHIL # BLD AUTO: 0.01 THOUSAND/ΜL (ref 0–0.61)
EOSINOPHIL NFR BLD AUTO: 0 % (ref 0–6)
ERYTHROCYTE [DISTWIDTH] IN BLOOD BY AUTOMATED COUNT: 15.4 % (ref 11.6–15.1)
GFR SERPL CREATININE-BSD FRML MDRD: 53 ML/MIN/1.73SQ M
GLUCOSE SERPL-MCNC: 171 MG/DL (ref 65–140)
GLUCOSE SERPL-MCNC: 185 MG/DL (ref 65–140)
GLUCOSE SERPL-MCNC: 233 MG/DL (ref 65–140)
GLUCOSE SERPL-MCNC: 355 MG/DL (ref 65–140)
HCT VFR BLD AUTO: 33.7 % (ref 36.5–49.3)
HGB BLD-MCNC: 10.8 G/DL (ref 12–17)
IMM GRANULOCYTES # BLD AUTO: 0.05 THOUSAND/UL (ref 0–0.2)
IMM GRANULOCYTES NFR BLD AUTO: 1 % (ref 0–2)
LYMPHOCYTES # BLD AUTO: 0.88 THOUSANDS/ΜL (ref 0.6–4.47)
LYMPHOCYTES NFR BLD AUTO: 9 % (ref 14–44)
MCH RBC QN AUTO: 30 PG (ref 26.8–34.3)
MCHC RBC AUTO-ENTMCNC: 32 G/DL (ref 31.4–37.4)
MCV RBC AUTO: 94 FL (ref 82–98)
MONOCYTES # BLD AUTO: 0.75 THOUSAND/ΜL (ref 0.17–1.22)
MONOCYTES NFR BLD AUTO: 8 % (ref 4–12)
NEUTROPHILS # BLD AUTO: 7.76 THOUSANDS/ΜL (ref 1.85–7.62)
NEUTS SEG NFR BLD AUTO: 82 % (ref 43–75)
NRBC BLD AUTO-RTO: 0 /100 WBCS
PLATELET # BLD AUTO: 269 THOUSANDS/UL (ref 149–390)
PMV BLD AUTO: 10.3 FL (ref 8.9–12.7)
POTASSIUM SERPL-SCNC: 3.5 MMOL/L (ref 3.5–5.3)
PROCALCITONIN SERPL-MCNC: 0.11 NG/ML
RBC # BLD AUTO: 3.6 MILLION/UL (ref 3.88–5.62)
SODIUM SERPL-SCNC: 135 MMOL/L (ref 136–145)
WBC # BLD AUTO: 9.47 THOUSAND/UL (ref 4.31–10.16)

## 2022-03-02 PROCEDURE — 99232 SBSQ HOSP IP/OBS MODERATE 35: CPT | Performed by: PHYSICIAN ASSISTANT

## 2022-03-02 PROCEDURE — 80048 BASIC METABOLIC PNL TOTAL CA: CPT | Performed by: FAMILY MEDICINE

## 2022-03-02 PROCEDURE — 82948 REAGENT STRIP/BLOOD GLUCOSE: CPT

## 2022-03-02 PROCEDURE — 85025 COMPLETE CBC W/AUTO DIFF WBC: CPT | Performed by: FAMILY MEDICINE

## 2022-03-02 PROCEDURE — 94640 AIRWAY INHALATION TREATMENT: CPT

## 2022-03-02 PROCEDURE — 99222 1ST HOSP IP/OBS MODERATE 55: CPT | Performed by: INTERNAL MEDICINE

## 2022-03-02 PROCEDURE — 84145 PROCALCITONIN (PCT): CPT | Performed by: FAMILY MEDICINE

## 2022-03-02 PROCEDURE — 94760 N-INVAS EAR/PLS OXIMETRY 1: CPT

## 2022-03-02 RX ORDER — INSULIN GLARGINE 100 [IU]/ML
5 INJECTION, SOLUTION SUBCUTANEOUS
Status: DISCONTINUED | OUTPATIENT
Start: 2022-03-02 | End: 2022-03-04

## 2022-03-02 RX ORDER — LANOLIN ALCOHOL/MO/W.PET/CERES
3 CREAM (GRAM) TOPICAL
Status: DISCONTINUED | OUTPATIENT
Start: 2022-03-02 | End: 2022-03-10 | Stop reason: HOSPADM

## 2022-03-02 RX ADMIN — ISODIUM CHLORIDE 3 ML: 0.03 SOLUTION RESPIRATORY (INHALATION) at 08:39

## 2022-03-02 RX ADMIN — INSULIN LISPRO 6 UNITS: 100 INJECTION, SOLUTION INTRAVENOUS; SUBCUTANEOUS at 22:13

## 2022-03-02 RX ADMIN — METOPROLOL SUCCINATE 50 MG: 50 TABLET, EXTENDED RELEASE ORAL at 08:00

## 2022-03-02 RX ADMIN — APIXABAN 5 MG: 5 TABLET, FILM COATED ORAL at 08:00

## 2022-03-02 RX ADMIN — INSULIN LISPRO 3 UNITS: 100 INJECTION, SOLUTION INTRAVENOUS; SUBCUTANEOUS at 11:32

## 2022-03-02 RX ADMIN — GUAIFENESIN 600 MG: 600 TABLET, EXTENDED RELEASE ORAL at 17:16

## 2022-03-02 RX ADMIN — INSULIN LISPRO 1 UNITS: 100 INJECTION, SOLUTION INTRAVENOUS; SUBCUTANEOUS at 06:27

## 2022-03-02 RX ADMIN — METHYLPREDNISOLONE SODIUM SUCCINATE 40 MG: 40 INJECTION, POWDER, FOR SOLUTION INTRAMUSCULAR; INTRAVENOUS at 08:00

## 2022-03-02 RX ADMIN — LEVALBUTEROL HYDROCHLORIDE 1.25 MG: 1.25 SOLUTION, CONCENTRATE RESPIRATORY (INHALATION) at 13:49

## 2022-03-02 RX ADMIN — BUDESONIDE AND FORMOTEROL FUMARATE DIHYDRATE 2 PUFF: 160; 4.5 AEROSOL RESPIRATORY (INHALATION) at 08:01

## 2022-03-02 RX ADMIN — ISODIUM CHLORIDE 3 ML: 0.03 SOLUTION RESPIRATORY (INHALATION) at 19:24

## 2022-03-02 RX ADMIN — FUROSEMIDE 60 MG: 10 INJECTION, SOLUTION INTRAVENOUS at 07:57

## 2022-03-02 RX ADMIN — POTASSIUM CHLORIDE 20 MEQ: 1500 TABLET, EXTENDED RELEASE ORAL at 17:16

## 2022-03-02 RX ADMIN — TIOTROPIUM BROMIDE 18 MCG: 18 CAPSULE ORAL; RESPIRATORY (INHALATION) at 08:01

## 2022-03-02 RX ADMIN — ISODIUM CHLORIDE 3 ML: 0.03 SOLUTION RESPIRATORY (INHALATION) at 13:49

## 2022-03-02 RX ADMIN — LEVALBUTEROL HYDROCHLORIDE 1.25 MG: 1.25 SOLUTION, CONCENTRATE RESPIRATORY (INHALATION) at 08:39

## 2022-03-02 RX ADMIN — DOCUSATE SODIUM 100 MG: 100 CAPSULE ORAL at 08:00

## 2022-03-02 RX ADMIN — DOCUSATE SODIUM 100 MG: 100 CAPSULE ORAL at 17:16

## 2022-03-02 RX ADMIN — APIXABAN 5 MG: 5 TABLET, FILM COATED ORAL at 22:10

## 2022-03-02 RX ADMIN — INSULIN GLARGINE 5 UNITS: 100 INJECTION, SOLUTION SUBCUTANEOUS at 22:10

## 2022-03-02 RX ADMIN — MONTELUKAST 10 MG: 10 TABLET, FILM COATED ORAL at 22:10

## 2022-03-02 RX ADMIN — INSULIN LISPRO 6 UNITS: 100 INJECTION, SOLUTION INTRAVENOUS; SUBCUTANEOUS at 16:13

## 2022-03-02 RX ADMIN — POTASSIUM CHLORIDE 20 MEQ: 1500 TABLET, EXTENDED RELEASE ORAL at 08:00

## 2022-03-02 RX ADMIN — METHYLPREDNISOLONE SODIUM SUCCINATE 40 MG: 40 INJECTION, POWDER, FOR SOLUTION INTRAMUSCULAR; INTRAVENOUS at 22:10

## 2022-03-02 RX ADMIN — GUAIFENESIN 600 MG: 600 TABLET, EXTENDED RELEASE ORAL at 08:00

## 2022-03-02 RX ADMIN — FUROSEMIDE 60 MG: 10 INJECTION, SOLUTION INTRAVENOUS at 16:13

## 2022-03-02 RX ADMIN — ATORVASTATIN CALCIUM 10 MG: 10 TABLET, FILM COATED ORAL at 08:00

## 2022-03-02 RX ADMIN — BUDESONIDE AND FORMOTEROL FUMARATE DIHYDRATE 2 PUFF: 160; 4.5 AEROSOL RESPIRATORY (INHALATION) at 17:15

## 2022-03-02 RX ADMIN — LEVALBUTEROL HYDROCHLORIDE 1.25 MG: 1.25 SOLUTION, CONCENTRATE RESPIRATORY (INHALATION) at 19:24

## 2022-03-02 NOTE — CONSULTS
Consultation - Cardiology   Carli Henriquez 49 y o  male MRN: [id number]  Unit/Bed#: CW2 218-01 Encounter: [id number]      Assessment and Plan:    Acute on chronic diastolic heart failure (HCC)  -TTE 11/12/2021 LVEF 55%, LVIDd 6 0 cm, grade 1 diastolic dysfunction, normal RV, trace TR  -home diuretic regimen torsemide 40 mg p o  B i d   -heart failure regiment Toprol XL 50 mg daily  -current diuretic regiment Lasix 60 mg IV b i d   -appears to be a mixed picture of a heart failure exacerbation with a asthma exacerbation    Moderate asthma with acute exacerbation  -started on IV Solu-Medrol  -PFT's 10/22/2021 FVC 61%, FEV1 39%, FEV1/FVC ratio 49%, TLC 98%, residual volume 183%, DLCO 65%  -does also appear to have a component of asthma exacerbation contributing to the shortness of breath and hypoxia    Paroxysmal atrial fibrillation ([facility] )  -currently anticoagulated on Eliquis 5 mg b i d   - controlled with Toprol XL 50 mg daily    Chronic kidney disease stage III  -baseline creatinine appears to be around 1 4-1 6  -appears to be at baseline currently    Essential hypertension  -currently on Toprol XL 50 mg daily    Hyperlipidemia  -currently on Lipitor 10 mg daily    Type 2 diabetes mellitus with hyperglycemia ([facility] )  -hemoglobin A1c 7 0 on 11/10/2021    Morbid obesity ([facility] )  -history of gastric bypass around 2007    VICKY (obstructive sleep apnea)  -has CPAP machine at home, but reports it is currently recalled so has not been using it for several months now    Plan:  -continue diuresis with current regiment  -continue to treat Asthma exacerbation as well      History of Present Illness   Physician Requesting Consult: Tommie Fierro MD  Reason for Consult / Principal Problem:  Heart failure exacerbation  HPI: Carli Henriquez is a 49y o  year old male who presents with shortness of breath and lower extremity swelling    His past medical history of hypertension, hyperlipidemia, type 2 diabetes, asthma, atrial fibrillation a/c on Eliquis, VICKY, and heart failure with preserved ejection fraction  Patient reports initially developing some dyspnea on exertion around February 20th  He then saw cardiology in the office on February 22nd at which time he was torsemide was increased to 40 mg daily to b i d  He reports the increase in dose of torsemide did not significantly increase his urine output and also symptoms continue to worsen  Yesterday he reports his breathing became significantly worse so he came to the emergency department for evaluation  There he was found to be initially hypoxic to the 80s requiring a non-rebreather  He was started on IV Lasix and also given Solu-Medrol for possible asthma exacerbation  Today he has significant improvement in his breathing and is only requiring 2 L nasal cannula  Consults    Review of Systems:  Review of Systems   Constitutional: Negative for chills and fatigue  HENT: Negative for congestion  Eyes: Negative for photophobia and visual disturbance  Respiratory: Positive for shortness of breath  Cardiovascular: Positive for leg swelling  Negative for chest pain and palpitations  Gastrointestinal: Positive for abdominal distention  Negative for abdominal pain, nausea and vomiting  Genitourinary: Negative for difficulty urinating and dysuria  Musculoskeletal: Negative for arthralgias and joint swelling  Skin: Negative for color change, pallor and rash  Neurological: Negative for dizziness, syncope and headaches           Historical Information   Past Medical History:   Diagnosis Date    Acute gastritis without hemorrhage     last assessed 3/24/17    Asthma     Diabetes mellitus ([facility] )     Gastric bypass status for obesity     Hyperlipidemia     Hypertension     Impaired fasting glucose     last assessed 5/10/17    Obesity     Viral gastroenteritis     last assessed 11/4/16     Past Surgical History:   Procedure Laterality Date    CARPAL TUNNEL RELEASE      bilateral    GASTRIC BYPASS  2004    with han en y   [address]      ventral inscisional    TONSILLECTOMY       Social History     Substance and Sexual Activity   Alcohol Use Yes    Alcohol/week: 1 0 standard drink    Types: 1 Standard drinks or equivalent per week    Comment: social     Social History     Substance and Sexual Activity   Drug Use No     Social History     Tobacco Use   Smoking Status Light Tobacco Smoker    Types: Cigars   Smokeless Tobacco Current User   Tobacco Comment    rare/hasn't smoked in 1 year     Family History: non-contributory    Meds/Allergies   all current active meds have been reviewed and current meds:   Current Facility-Administered Medications   Medication Dose Route Frequency    acetaminophen (TYLENOL) tablet 650 mg  650 mg Oral Q6H PRN    albuterol (PROVENTIL HFA,VENTOLIN HFA) inhaler 2 puff  2 puff Inhalation Q4H PRN    aluminum-magnesium hydroxide-simethicone (MYLANTA) oral suspension 30 mL  30 mL Oral Q6H PRN    apixaban (ELIQUIS) tablet 5 mg  5 mg Oral BID    atorvastatin (LIPITOR) tablet 10 mg  10 mg Oral Daily    budesonide-formoterol (SYMBICORT) 160-4 5 mcg/act inhaler 2 puff  2 puff Inhalation BID    docusate sodium (COLACE) capsule 100 mg  100 mg Oral BID    furosemide (LASIX) injection 60 mg  60 mg Intravenous BID (diuretic)    guaiFENesin (MUCINEX) 12 hr tablet 600 mg  600 mg Oral BID    insulin lispro (HumaLOG) 100 units/mL subcutaneous injection 1-6 Units  1-6 Units Subcutaneous TID AC    insulin lispro (HumaLOG) 100 units/mL subcutaneous injection 1-6 Units  1-6 Units Subcutaneous HS    levalbuterol (XOPENEX) inhalation solution 1 25 mg  1 25 mg Nebulization TID    methylPREDNISolone sodium succinate (Solu-MEDROL) injection 40 mg  40 mg Intravenous Q12H ANA    metoprolol succinate (TOPROL-XL) 24 hr tablet 50 mg  50 mg Oral Daily    montelukast (SINGULAIR) tablet 10 mg  10 mg Oral HS    ondansetron (ZOFRAN) injection 4 mg  4 mg Intravenous Q6H PRN    oxyCODONE-acetaminophen (PERCOCET) 5-325 mg per tablet 1 tablet  1 tablet Oral Q4H PRN    potassium chloride (K-DUR,KLOR-CON) CR tablet 20 mEq  20 mEq Oral BID    sodium chloride 0 9 % inhalation solution 3 mL  3 mL Nebulization TID    tiotropium (SPIRIVA) capsule for inhaler 18 mcg  18 mcg Inhalation Daily     Allergies   Allergen Reactions    Azithromycin Other (See Comments)     Shaky, uneasy feeling     Bactrim [Sulfamethoxazole-Trimethoprim] Other (See Comments)     shakiness       Objective   Vitals: Blood pressure 114/61, pulse 91, temperature 97 7 °F (36 5 °C), resp  rate 21, height 6' (1 829 m), weight (!) 144 kg (317 lb 8 oz), SpO2 95 %  , Body mass index is 43 06 kg/m² ,   Orthostatic Blood Pressures      Most Recent Value   Blood Pressure 114/61 filed at 03/02/2022 0757   Patient Position - Orthostatic VS Sitting filed at 03/01/2022 4273            Intake/Output Summary (Last 24 hours) at 3/2/2022 1135  Last data filed at 3/2/2022 0301  Gross per 24 hour   Intake 490 ml   Output 1100 ml   Net -610 ml       Invasive Devices  Report    Peripheral Intravenous Line            Peripheral IV 03/01/22 Right Forearm <1 day                    Physical Exam:  Physical Exam  Constitutional:       General: He is not in acute distress  Appearance: He is well-developed  He is not diaphoretic  Eyes:      Conjunctiva/sclera: Conjunctivae normal       Pupils: Pupils are equal, round, and reactive to light  Neck:      Vascular: No JVD  Cardiovascular:      Rate and Rhythm: Normal rate and regular rhythm  Heart sounds: Normal heart sounds  No murmur heard  No friction rub  No gallop  Pulmonary:      Effort: Pulmonary effort is normal  No respiratory distress  Breath sounds: Decreased air movement present  Wheezing and rhonchi present  Abdominal:      General: Bowel sounds are normal  There is no distension  Palpations: Abdomen is soft  Tenderness: There is no abdominal tenderness  Musculoskeletal:         General: Normal range of motion  Cervical back: Normal range of motion and neck supple  Right lower le+ Pitting Edema present  Left lower le+ Pitting Edema present  Skin:     General: Skin is warm and dry  Neurological:      Mental Status: He is alert and oriented to person, place, and time  Psychiatric:         Behavior: Behavior normal            Lab Results:     Lab Results   Component Value Date    TROPONINI <0 02 2021    TROPONINI <0 02 2021    TROPONINI <0 02 2021       Lab Results   Component Value Date    CALCIUM 7 2 (L) 2022    K 3 5 2022    CO2 30 2022    CL 99 (L) 2022    BUN 24 2022    CREATININE 1 47 (H) 2022       Lab Results   Component Value Date    WBC 9 47 2022    HGB 10 8 (L) 2022    HCT 33 7 (L) 2022    MCV 94 2022     2022       No results found for: CHOL  Lab Results   Component Value Date    HDL 66 10/01/2021    HDL 46 2020    HDL 40 2019     Lab Results   Component Value Date    LDLCALC 73 10/01/2021    LDLCALC 144 (H) 2020    LDLCALC 131 (H) 2019     Lab Results   Component Value Date    TRIG 90 10/01/2021    TRIG 126 2020    TRIG 126 2019       Lab Results   Component Value Date    ALT 13 2022    AST 12 2022               Imaging: I have personally reviewed pertinent reports        EKG:  Sinus tachycardia at a rate of 110, occasional PVCs

## 2022-03-02 NOTE — UTILIZATION REVIEW
Inpatient Admission Authorization Request   NOTIFICATION OF INPATIENT ADMISSION/INPATIENT AUTHORIZATION REQUEST   SERVICING FACILITY:   [facility]  Address: [address] [place] [address], [address] [place] [address]  Tax ID: [id number]  NPI: [id number]  Place of Service: Inpatient [address] [facility] Code: 24     ATTENDING PROVIDER:  Attending Name and NPI#: ManuelOle Linder [[id number]]  Address: [address] [place] [address], [address] [place] [address]  Phone: [phone]     UTILIZATION REVIEW CONTACT:  Jalil Almanzar Utilization   Network Utilization Review Department  Phone: [phone]  Fax: [phone]  Email: [email or web address]     PHYSICIAN ADVISORY SERVICES:  FOR GFGS-MP-EGTW REVIEW - MEDICAL NECESSITY DENIAL  Phone: [phone]  Fax: [phone]  Email: [email or web address]     TYPE OF REQUEST:  Inpatient Status     ADMISSION INFORMATION:  ADMISSION DATE/TIME: 3/2/22  1:06 PM  PATIENT DIAGNOSIS CODE/DESCRIPTION:  SOB (shortness of breath) [R06 02]  Acute exacerbation of CHF (congestive heart failure) ([facility]) [I50 9]  Asthma exacerbation [J45 901]  Acute respiratory failure with hypoxia ([address] ) [J96 01]  DISCHARGE DATE/TIME: No discharge date for patient encounter  IMPORTANT INFORMATION:  Please contact the Jalil Almanzar directly with any questions or concerns regarding this request  Department voicemails are confidential     Send requests for admission clinical reviews, concurrent reviews, approvals, and administrative denials due to lack of clinical to fax [phone]

## 2022-03-02 NOTE — ASSESSMENT & PLAN NOTE
Lab Results   Component Value Date    HGBA1C 7 0 (H) 11/10/2021       Recent Labs     03/01/22  1620 03/01/22  2147 03/02/22  0553   POCGLU 332* 314* 185*       Blood Sugar Average: Last 72 hrs:  (P) 277   · Hold home oral meds while inpatient  · Diabetic diet and sliding scale

## 2022-03-02 NOTE — RESPIRATORY THERAPY NOTE
RT Protocol Note  Amber Haro 49 y o  male MRN: [id number]  Unit/Bed#: CW2 218-01 Encounter: [id number]    Assessment    Principal Problem: Moderate asthma with acute exacerbation  Active Problems:    Essential hypertension    Type 2 diabetes mellitus with hyperglycemia (HCC)    Morbid obesity (HCC)    VICKY (obstructive sleep apnea)    Acute on chronic diastolic heart failure (HCC)    Paroxysmal atrial fibrillation ([facility] )      Home Pulmonary Medications:         Past Medical History:   Diagnosis Date    Acute gastritis without hemorrhage     last assessed 3/24/17    Asthma     Diabetes mellitus ([facility] )     Gastric bypass status for obesity     Hyperlipidemia     Hypertension     Impaired fasting glucose     last assessed 5/10/17    Obesity     Viral gastroenteritis     last assessed 11/4/16     Social History     Socioeconomic History    Marital status: /Civil Union     Spouse name: Not on file    Number of children: Not on file    Years of education: Not on file    Highest education level: Not on file   Occupational History    Not on file   Tobacco Use    Smoking status: Light Tobacco Smoker     Types: Cigars    Smokeless tobacco: Current User    Tobacco comment: rare/hasn't smoked in 1 year   Vaping Use    Vaping Use: Never used   Substance and Sexual Activity    Alcohol use:  Yes     Alcohol/week: 1 0 standard drink     Types: 1 Standard drinks or equivalent per week     Comment: social    Drug use: No    Sexual activity: Yes     Partners: Female     Birth control/protection: None   Other Topics Concern    Not on file   Social History Narrative    Not on file     Social Determinants of Health     Financial Resource Strain: Not on file   Food Insecurity: Not on file   Transportation Needs: Not on file   Physical Activity: Not on file   Stress: Not on file   Social Connections: Not on file   Intimate Partner Violence: Not on file   Housing Stability: Not on file       Subjective Objective    Physical Exam:   Assessment Type: Assess only  General Appearance: Awake,Alert  Respiratory Pattern: Dyspnea with exertion  Chest Assessment: Chest expansion symmetrical  Bilateral Breath Sounds: Expiratory wheezes    Vitals:  Blood pressure 114/61, pulse 91, temperature 97 7 °F (36 5 °C), resp  rate 21, height 6' (1 829 m), weight (!) 144 kg (317 lb 8 oz), SpO2 95 %  Imaging and other studies: I have personally reviewed pertinent reports  Plan    Respiratory Plan: Mild Distress pathway        Resp Comments: (P) pt given udn tx  pt spo2 96%  will continue to monuter pt

## 2022-03-02 NOTE — UTILIZATION REVIEW
Initial Clinical Review  Observation on 03/01 @ 0840 changed to Inpatient on 03/02 @ 1306  Pt requiring continued stay for continued dyspnea on exertion, on IV diuresis, IV Solu Medrol    Admission: Date/Time/Statement:   Admission Orders (From admission, onward)     Ordered        03/02/22 1306  Inpatient Admission  Once            03/01/22 0840  Place in Observation  Once                      Orders Placed This Encounter   Procedures    Inpatient Admission     Standing Status:   Standing     Number of Occurrences:   1     Order Specific Question:   Level of Care     Answer:   Med Surg [16]     Order Specific Question:   Estimated length of stay     Answer:   More than 2 Midnights     Order Specific Question:   Certification     Answer:   I certify that inpatient services are medically necessary for this patient for a duration of greater than two midnights  See H&P and MD Progress Notes for additional information about the patient's course of treatment  ED Arrival Information     Expected Arrival Acuity    - 3/1/2022 06:05 Emergent         Means of arrival Escorted by Service Admission type    Ambulance [facility]) Hospitalist Emergency         Arrival complaint    Chest Pain        Chief Complaint   Patient presents with    Shortness of Breath     felt SOB used albuterol didn't seem to work, hx of CHF and new dx of a-fib, reports feeling dizzy when standing, found in the 80s per EMS, doesn't wear 02 at home        Initial Presentation: 49 y o  male presented to the ED via EMS with worsening SOB x 1 week and increasing pedal edema  PMH of CHF on torsemide, moderate asthma, AFib on Eliquis, hypertension  Pt's cardiologist increased his Torsemide d/t worsening sob with no improvement  In ED, found to have diffuse wheezing, lower extremity edema  Troponin negative x2  ProBNP mildly elevated  Started on IV Lasix and IV Solu-Medrol      Admit as observation level of care for CHF and asthma exacerbation: cont IV Lasix, daily weight, I/O, salt and fluid restriction  Cont home symbicort, spiriva  Singulair, Albuterol prn  Start solu medrol 40 mg bid, taper as able  03/02  IM Notes: Pt still reports dyspnea with exertion, transient episodes of chest pain overnight that have resolved  Still with lower extremity swelling  Weaned down to RA with 2L NC use with exertion  Cardiology consulted, cont solu medrol, Daily weight, intake and output, Salt restriction and fluid restriction, Continue home inhaler Symbicort, Spiriva, Continue Singulair, Albuterol p r n , cont IV Lasix    Heart Failure Consult; Chronic HFpEF; LVEF 55%; LVIDd 6 0 cm; NYHA III; ACC/AHA Stage C   continue diuresis with current regiment   -continue to treat Asthma exacerbation    Given multiple admits, discussed Cardiomems     ED Triage Vitals [03/01/22 0609]   Temperature Pulse Respirations Blood Pressure SpO2   97 5 °F (36 4 °C) (!) 115 (!) 25 163/88 99 %      Temp Source Heart Rate Source Patient Position - Orthostatic VS BP Location FiO2 (%)   Oral Monitor Sitting Right arm --      Pain Score       No Pain          Wt Readings from Last 1 Encounters:   03/02/22 (!) 144 kg (317 lb 8 oz)     Additional Vital Signs:   Date/Time Temp Pulse Resp BP MAP (mmHg) SpO2 O2 Flow Rate (L/min) O2 Device Patient Position - Orthostatic VS   03/02/22 15:43:37 98 3 °F (36 8 °C) 70 18 120/51 74 94 % -- -- --   03/02/22 1300 -- -- -- -- -- 97 % 2 L/min Nasal cannula --   03/02/22 1051 -- -- -- -- -- -- 2 L/min Nasal cannula --   03/02/22 0841 -- -- -- -- -- 95 % -- -- --   03/02/22 07:57:43 -- 91 -- 114/61 79 95 % -- -- --   03/02/22 0100 -- -- -- -- -- -- -- None (Room air) --   03/01/22 23:40:03 97 7 °F (36 5 °C) 72 21 129/75 93 94 % -- -- --   03/01/22 23:20:25 -- 85 -- 128/74 92 93 % -- -- --   03/01/22 1926 -- -- -- -- -- 93 % -- -- --   03/01/22 16:21:19 97 6 °F (36 4 °C) 88 18 129/74 92 92 % -- -- --   03/01/22 1330 -- -- -- -- -- -- 2 L/min Nasal cannula --   03/01/22 13:05:18 97 4 °F (36 3 °C) Abnormal  98 -- 131/76 94 91 % -- -- --   03/01/22 13:04:05 97 4 °F (36 3 °C) Abnormal  97 18 131/76 94 91 % -- -- --   03/01/22 0843 -- 103 18 141/67 -- 91 % -- Nasal cannula --   03/01/22 0611 -- -- -- -- -- -- -- Non-rebreather mask --       Pertinent Labs/Diagnostic Test Results:   XR chest 1 view portable   Final Result by Nisa Pichardo MD (03/01 1208)      No acute cardiopulmonary disease  Please refer to prior chest CT report for follow-up recommendations                 Workstation performed: [id number]           03/01 EKG result: NSR    Results from last 7 days   Lab Units 03/01/22  1128   SARS-COV-2  Negative     Results from last 7 days   Lab Units 03/02/22  0530 03/01/22  0615   WBC Thousand/uL 9 47 12 31*   HEMOGLOBIN g/dL 10 8* 12 4   HEMATOCRIT % 33 7* 38 3   PLATELETS Thousands/uL 269 379   NEUTROS ABS Thousands/µL 7 76* 9 22*         Results from last 7 days   Lab Units 03/02/22  0530 03/01/22  0636   SODIUM mmol/L 135* 141   POTASSIUM mmol/L 3 5 3 2*   CHLORIDE mmol/L 99* 100   CO2 mmol/L 30 33*   ANION GAP mmol/L 6 8   BUN mg/dL 24 15   CREATININE mg/dL 1 47* 1 41*   EGFR ml/min/1 73sq m 53 56   CALCIUM mg/dL 7 2* 7 6*     Results from last 7 days   Lab Units 03/01/22  0636   AST U/L 12   ALT U/L 13   ALK PHOS U/L 106   TOTAL PROTEIN g/dL 7 4   ALBUMIN g/dL 2 7*   TOTAL BILIRUBIN mg/dL 0 80     Results from last 7 days   Lab Units 03/02/22  1543 03/02/22  1108 03/02/22  0553 03/01/22  2147 03/01/22  1620   POC GLUCOSE mg/dl 355* 233* 185* 314* 332*     Results from last 7 days   Lab Units 03/02/22  0530 03/01/22  0636   GLUCOSE RANDOM mg/dL 171* 118       Results from last 7 days   Lab Units 03/01/22  0640   PH OMERO  7 402*   PCO2 OMERO mm Hg 53 9*   PO2 OMERO mm Hg 67 5*   HCO3 OMERO mmol/L 32 8*   BASE EXC OMERO mmol/L 6 6   O2 CONTENT OMERO ml/dL 16 7   O2 HGB, VENOUS % 91 0*             Results from last 7 days   Lab Units 03/01/22  1128 03/01/22  0902 03/01/22  0637   HS TNI 0HR ng/L  --   --  18   HS TNI 2HR ng/L  --  16  --    HSTNI D2 ng/L  --  -2  --    HS TNI 4HR ng/L 16  --   --    HSTNI D4 ng/L -2  --   --                  Results from last 7 days   Lab Units 03/02/22  0530 03/01/22  0636   PROCALCITONIN ng/ml 0 11 0 06                 Results from last 7 days   Lab Units 03/01/22  0636   NT-PRO BNP pg/mL 805*       Results from last 7 days   Lab Units 03/01/22  1128   INFLUENZA A PCR  Negative   INFLUENZA B PCR  Negative   RSV PCR  Negative     ED Treatment:   Medication Administration from 03/01/2022 0605 to 03/01/2022 1247       Date/Time Order Dose Route Action     03/01/2022 0641 albuterol inhalation solution 10 mg 10 mg Nebulization Given     03/01/2022 0641 ipratropium (ATROVENT) 0 02 % inhalation solution 1 mg 1 mg Nebulization Given     03/01/2022 0641 sodium chloride 0 9 % inhalation solution 12 mL 12 mL Nebulization Given     03/01/2022 0751 furosemide (LASIX) injection 40 mg 40 mg Intravenous Given     03/01/2022 0749 potassium chloride (K-DUR,KLOR-CON) CR tablet 40 mEq 40 mEq Oral Given     03/01/2022 0750 methylPREDNISolone sodium succinate (Solu-MEDROL) injection 125 mg 125 mg Intravenous Given        Past Medical History:   Diagnosis Date    Acute gastritis without hemorrhage     last assessed 3/24/17    Asthma     Diabetes mellitus ([place] [address] )     Gastric bypass status for obesity     Hyperlipidemia     Hypertension     Impaired fasting glucose     last assessed 5/10/17    Obesity     Viral gastroenteritis     last assessed 11/4/16     Present on Admission:   Acute on chronic diastolic heart failure ([facility] )   Essential hypertension   Morbid obesity ([facility] )   Moderate asthma with acute exacerbation   VICKY (obstructive sleep apnea)   Type 2 diabetes mellitus with hyperglycemia (HCC)      Admitting Diagnosis: SOB (shortness of breath) [R06 02]  Acute exacerbation of CHF (congestive heart failure) ([place] [address] ) [I50 9]  Asthma exacerbation [J45 901]  Acute respiratory failure with hypoxia (HCC) [J96 01]  Age/Sex: 49 y o  male  Admission Orders:  SCD  Daily weights  I/O  Diet: Sodium 2 gm; fluid restriction 1500 ml  Scheduled Medications:  apixaban, 5 mg, Oral, BID  atorvastatin, 10 mg, Oral, Daily  budesonide-formoterol, 2 puff, Inhalation, BID  docusate sodium, 100 mg, Oral, BID  furosemide, 60 mg, Intravenous, BID (diuretic)  guaiFENesin, 600 mg, Oral, BID  insulin glargine, 5 Units, Subcutaneous, HS  insulin lispro, 1-6 Units, Subcutaneous, TID AC  insulin lispro, 1-6 Units, Subcutaneous, HS  levalbuterol, 1 25 mg, Nebulization, TID  methylPREDNISolone sodium succinate, 40 mg, Intravenous, Q12H ANA  metoprolol succinate, 50 mg, Oral, Daily  montelukast, 10 mg, Oral, HS  potassium chloride, 20 mEq, Oral, BID  sodium chloride, 3 mL, Nebulization, TID  tiotropium, 18 mcg, Inhalation, Daily      Continuous IV Infusions: none     PRN Meds:  acetaminophen, 650 mg, Oral, Q6H PRN 03/01 x 1  albuterol, 2 puff, Inhalation, Q4H PRN  aluminum-magnesium hydroxide-simethicone, 30 mL, Oral, Q6H PRN  ondansetron, 4 mg, Intravenous, Q6H PRN  oxyCODONE-acetaminophen, 1 tablet, Oral, Q4H PRN        IP CONSULT TO CARDIOLOGY    Network Utilization Review Department  ATTENTION: Please call with any questions or concerns to [phone] and carefully listen to the prompts so that you are directed to the right person  All voicemails are confidential   Horacio Mathias all requests for admission clinical reviews, approved or denied determinations and any other requests to dedicated fax number below belonging to the campus where the patient is receiving treatment   List of dedicated fax numbers for the Facilities:  [address] DENIALS (Administrative/Medical Necessity) [phone]   1000  [facility] (Maternity/NICU/Pediatrics) [phone] [address] [phone]   [address] [phone]     [address] [facility] [place] [address] [place] [address] [phone] [facility] [facility] [address] [phone]

## 2022-03-02 NOTE — PLAN OF CARE
Problem: PAIN - ADULT  Goal: Verbalizes/displays adequate comfort level or baseline comfort level  Description: Interventions:  - Encourage patient to monitor pain and request assistance  - Assess pain using appropriate pain scale  - Administer analgesics based on type and severity of pain and evaluate response  - Implement non-pharmacological measures as appropriate and evaluate response  - Consider cultural and social influences on pain and pain management  - Notify physician/advanced practitioner if interventions unsuccessful or patient reports new pain  Outcome: Progressing     Problem: INFECTION - ADULT  Goal: Absence or prevention of progression during hospitalization  Description: INTERVENTIONS:  - Assess and monitor for signs and symptoms of infection  - Monitor lab/diagnostic results  - Monitor all insertion sites, i e  indwelling lines, tubes, and drains  - Monitor endotracheal if appropriate and nasal secretions for changes in amount and color  - [place] appropriate cooling/warming therapies per order  - Administer medications as ordered  - Instruct and encourage patient and family to use good hand hygiene technique  - Identify and instruct in appropriate isolation precautions for identified infection/condition  Outcome: Progressing     Problem: DISCHARGE PLANNING  Goal: Discharge to home or other facility with appropriate resources  Description: INTERVENTIONS:  - Identify barriers to discharge w/patient and caregiver  - Arrange for needed discharge resources and transportation as appropriate  - Identify discharge learning needs (meds, wound care, etc )  - Arrange for interpretive services to assist at discharge as needed  - Refer to Case Management Department for coordinating discharge planning if the patient needs post-hospital services based on physician/advanced practitioner order or complex needs related to functional status, cognitive ability, or social support system  Outcome: Progressing

## 2022-03-02 NOTE — PROGRESS NOTES
[facility]  Progress Note - Nikki Robledo 1965, 49 y o  male MRN: [id number]  Unit/Bed#: CW2 218-01 Encounter: [id number]  Primary Care Provider: Zay Delarosa MD   Date and time admitted to hospital: 3/1/2022  6:05 AM    * Acute on chronic diastolic heart failure [facility])  Assessment & Plan  Wt Readings from Last 3 Encounters:   03/02/22 (!) 144 kg (317 lb 8 oz)   02/22/22 (!) 143 kg (315 lb 1 6 oz)   02/12/22 (!) 145 kg (319 lb)     · Multiple recent admissions with CHF exacerbation  Presented with asthma exacerbation, worsening pedal edema bilaterally, suspect SOB to be more related to CHF  · Patient on outpatient torsemide 40 mg b i d  which was increased recently  · Follows with Dr Joe Wilhelm outpatient  · Not on home oxygen  · BNP elevated 805  · Started Lasix IV 60 mg b i d   · Patient still appears volume overloaded, weaned down to room air with occasional 2 L supplemental oxygen use with exertion  · Cardiology consulted, recommendations appreciated  · Daily weight, intake and output  · Salt restriction and fluid restriction    Moderate asthma with acute exacerbation  Assessment & Plan  · Patient with history of moderate asthma presented to ER with complaint of increasing shortness of breath, hypoxia, diffuse wheezing  · Patient received Solu-Medrol 125 mg IV in ER with some improvement  · Continue home inhaler Symbicort, Spiriva  · Continue Singulair  · Albuterol p r n    · Started Solu-Medrol 40 mg b i d , continue today and taper down as able  · Respiratory protocol    Paroxysmal atrial fibrillation (HCC)  Assessment & Plan  · New diagnosis, rate controlled on metoprolol  · Anticoagulated on Eliquis    VICKY (obstructive sleep apnea)  Assessment & Plan  · CPAP at bedtime    Morbid obesity (HCC)  Assessment & Plan  · Lifestyle modification    Type 2 diabetes mellitus with hyperglycemia [facility])  Assessment & Plan  Lab Results   Component Value Date    HGBA1C 7 0 (H) 11/10/2021       Recent Labs     22  1620 22  2147 22  0553   POCGLU 332* 314* 185*       Blood Sugar Average: Last 72 hrs:  (P) 277   · Hold home oral meds while inpatient  · Diabetic diet and sliding scale    Essential hypertension  Assessment & Plan  · Blood pressure stable, continue home medication metoprolol  · Also on IV Lasix      VTE Pharmacologic Prophylaxis:   High Risk (Score >/= 5) - Pharmacological DVT Prophylaxis Ordered: apixaban (Eliquis)  Sequential Compression Devices Ordered  Patient Centered Rounds: I performed bedside rounds with nursing staff today  Discussions with Specialists or Other Care Team Provider:  Cardiology    Education and Discussions with Family / Patient: Patient declined call to   Time Spent for Care: 30 minutes  More than 50% of total time spent on counseling and coordination of care as described above  Current Length of Stay: 0 day(s)  Current Patient Status: Observation   Certification Statement: The patient will continue to require additional inpatient hospital stay due to IV diuresis, Cardiology consult  Discharge Plan: Anticipate discharge tomorrow to home  Code Status: Level 1 - Full Code    Subjective:   Patient seen at bedside this a m , still reports dyspnea with exertion, transient episodes of chest overnight that have resolved  Patient still with lower extremity swelling  Objective:     Vitals:   Temp (24hrs), Av 5 °F (36 4 °C), Min:97 4 °F (36 3 °C), Max:97 7 °F (36 5 °C)    Temp:  [97 4 °F (36 3 °C)-97 7 °F (36 5 °C)] 97 7 °F (36 5 °C)  HR:  [72-98] 91  Resp:  [18-21] 21  BP: (114-131)/(61-76) 114/61  SpO2:  [91 %-95 %] 95 %  Body mass index is 43 06 kg/m²  Input and Output Summary (last 24 hours):      Intake/Output Summary (Last 24 hours) at 3/2/2022 0937  Last data filed at 3/2/2022 0301  Gross per 24 hour   Intake 490 ml   Output 1100 ml   Net -610 ml       Physical Exam:   Physical Exam  Constitutional:       General: He is not in acute distress  Appearance: He is obese  He is not ill-appearing, toxic-appearing or diaphoretic  Cardiovascular:      Rate and Rhythm: Normal rate and regular rhythm  Pulses: Normal pulses  Heart sounds: Normal heart sounds  Pulmonary:      Effort: Pulmonary effort is normal  No respiratory distress  Breath sounds: Rales present  Comments: Somewhat decreased breath sounds due to body habitus, bibasilar rales  Abdominal:      General: Bowel sounds are normal  There is no distension  Palpations: Abdomen is soft  Tenderness: There is no abdominal tenderness  Musculoskeletal:         General: Swelling present  No tenderness  Comments: 1+ bilateral lower extremity pitting edema   Skin:     General: Skin is warm  Neurological:      Mental Status: He is alert and oriented to person, place, and time  Psychiatric:         Mood and Affect: Mood normal          Behavior: Behavior normal           Additional Data:     Labs:  Results from last 7 days   Lab Units 03/02/22  0530   WBC Thousand/uL 9 47   HEMOGLOBIN g/dL 10 8*   HEMATOCRIT % 33 7*   PLATELETS Thousands/uL 269   NEUTROS PCT % 82*   LYMPHS PCT % 9*   MONOS PCT % 8   EOS PCT % 0     Results from last 7 days   Lab Units 03/02/22  0530 03/01/22  0636 03/01/22  0636   SODIUM mmol/L 135*   < > 141   POTASSIUM mmol/L 3 5   < > 3 2*   CHLORIDE mmol/L 99*   < > 100   CO2 mmol/L 30   < > 33*   BUN mg/dL 24   < > 15   CREATININE mg/dL 1 47*   < > 1 41*   ANION GAP mmol/L 6   < > 8   CALCIUM mg/dL 7 2*   < > 7 6*   ALBUMIN g/dL  --   --  2 7*   TOTAL BILIRUBIN mg/dL  --   --  0 80   ALK PHOS U/L  --   --  106   ALT U/L  --   --  13   AST U/L  --   --  12   GLUCOSE RANDOM mg/dL 171*   < > 118    < > = values in this interval not displayed           Results from last 7 days   Lab Units 03/02/22  0553 03/01/22  2147 03/01/22  1620   POC GLUCOSE mg/dl 185* 314* 332*         Results from last 7 days   Lab Units 03/02/22  0530 03/01/22  0636   PROCALCITONIN ng/ml 0 11 0 06       Lines/Drains:  Invasive Devices  Report    Peripheral Intravenous Line            Peripheral IV 03/01/22 Right Forearm <1 day                       Imaging: Reviewed radiology reports from this admission including: chest xray    Recent Cultures (last 7 days):         Last 24 Hours Medication List:   Current Facility-Administered Medications   Medication Dose Route Frequency Provider Last Rate    acetaminophen  650 mg Oral Q6H PRN Nixon Zavaleta MD      albuterol  2 puff Inhalation Q4H PRN Nixon Zavaleta MD      aluminum-magnesium hydroxide-simethicone  30 mL Oral Q6H PRN Nixon Zavaleta MD      apixaban  5 mg Oral BID Nixon Zavaleta MD      atorvastatin  10 mg Oral Daily Nixon Zavaleta MD      budesonide-formoterol  2 puff Inhalation BID Nixon Zavaleta MD      docusate sodium  100 mg Oral BID Nixon Zavaleta MD      furosemide  60 mg Intravenous BID (diuretic) Moni Rodríguez PA-C      guaiFENesin  600 mg Oral BID Nixon Zavaleta MD      insulin lispro  1-6 Units Subcutaneous TID AC Devang Ingram MD      insulin lispro  1-6 Units Subcutaneous HS Nixon Zavaleta MD      levalbuterol  1 25 mg Nebulization TID Nixon Zavaleta MD      methylPREDNISolone sodium succinate  40 mg Intravenous Q12H [address] Devang Ingram MD      metoprolol succinate  50 mg Oral Daily Devang Ingram MD      montelukast  10 mg Oral HS Devang Ingram MD      ondansetron  4 mg Intravenous Q6H PRN Nixon Zavaleta MD      oxyCODONE-acetaminophen  1 tablet Oral Q4H PRN Nixon Zavaleta MD      potassium chloride  20 mEq Oral BID Nixon Zavaleta MD      sodium chloride  3 mL Nebulization TID Nixon Zavaleta MD      tiotropium  18 mcg Inhalation Daily Nixon Zavaleta MD          Today, Patient Was Seen By: Allyssa Westbrook PA-C    **Please Note: This note may have been constructed using a voice recognition system  **

## 2022-03-02 NOTE — ASSESSMENT & PLAN NOTE
Wt Readings from Last 3 Encounters:   03/02/22 (!) 144 kg (317 lb 8 oz)   02/22/22 (!) 143 kg (315 lb 1 6 oz)   02/12/22 (!) 145 kg (319 lb)     · Multiple recent admissions with CHF exacerbation  Presented with asthma exacerbation, worsening pedal edema bilaterally, suspect SOB to be more related to CHF  · Patient on outpatient torsemide 40 mg b i d  which was increased recently  · Follows with Dr Preston Silva outpatient    · Not on home oxygen  · BNP elevated 805  · Started Lasix IV 60 mg b i d   · Patient still appears volume overloaded, weaned down to room air with occasional 2 L supplemental oxygen use with exertion  · Cardiology consulted, recommendations appreciated  · Daily weight, intake and output  · Salt restriction and fluid restriction

## 2022-03-02 NOTE — ASSESSMENT & PLAN NOTE
· Patient with history of moderate asthma presented to ER with complaint of increasing shortness of breath, hypoxia, diffuse wheezing  · Patient received Solu-Medrol 125 mg IV in ER with some improvement  · Continue home inhaler Symbicort, Spiriva  · Continue Singulair  · Albuterol p r n    · Started Solu-Medrol 40 mg b i d , continue today and taper down as able  · Respiratory protocol

## 2022-03-03 PROBLEM — N18.31 STAGE 3A CHRONIC KIDNEY DISEASE (HCC): Status: ACTIVE | Noted: 2021-12-23

## 2022-03-03 LAB
ANION GAP SERPL CALCULATED.3IONS-SCNC: 6 MMOL/L (ref 4–13)
BASOPHILS # BLD AUTO: 0.02 THOUSANDS/ΜL (ref 0–0.1)
BASOPHILS NFR BLD AUTO: 0 % (ref 0–1)
BUN SERPL-MCNC: 27 MG/DL (ref 5–25)
CALCIUM SERPL-MCNC: 7.2 MG/DL (ref 8.3–10.1)
CHLORIDE SERPL-SCNC: 100 MMOL/L (ref 100–108)
CO2 SERPL-SCNC: 31 MMOL/L (ref 21–32)
CREAT SERPL-MCNC: 1.44 MG/DL (ref 0.6–1.3)
EOSINOPHIL # BLD AUTO: 0 THOUSAND/ΜL (ref 0–0.61)
EOSINOPHIL NFR BLD AUTO: 0 % (ref 0–6)
ERYTHROCYTE [DISTWIDTH] IN BLOOD BY AUTOMATED COUNT: 15.2 % (ref 11.6–15.1)
GFR SERPL CREATININE-BSD FRML MDRD: 54 ML/MIN/1.73SQ M
GLUCOSE SERPL-MCNC: 185 MG/DL (ref 65–140)
GLUCOSE SERPL-MCNC: 195 MG/DL (ref 65–140)
GLUCOSE SERPL-MCNC: 222 MG/DL (ref 65–140)
GLUCOSE SERPL-MCNC: 269 MG/DL (ref 65–140)
GLUCOSE SERPL-MCNC: 311 MG/DL (ref 65–140)
HCT VFR BLD AUTO: 31 % (ref 36.5–49.3)
HGB BLD-MCNC: 10.3 G/DL (ref 12–17)
IMM GRANULOCYTES # BLD AUTO: 0.07 THOUSAND/UL (ref 0–0.2)
IMM GRANULOCYTES NFR BLD AUTO: 1 % (ref 0–2)
LYMPHOCYTES # BLD AUTO: 0.69 THOUSANDS/ΜL (ref 0.6–4.47)
LYMPHOCYTES NFR BLD AUTO: 7 % (ref 14–44)
MAGNESIUM SERPL-MCNC: 2 MG/DL (ref 1.6–2.6)
MCH RBC QN AUTO: 29.6 PG (ref 26.8–34.3)
MCHC RBC AUTO-ENTMCNC: 33.2 G/DL (ref 31.4–37.4)
MCV RBC AUTO: 89 FL (ref 82–98)
MONOCYTES # BLD AUTO: 0.41 THOUSAND/ΜL (ref 0.17–1.22)
MONOCYTES NFR BLD AUTO: 4 % (ref 4–12)
NEUTROPHILS # BLD AUTO: 8.1 THOUSANDS/ΜL (ref 1.85–7.62)
NEUTS SEG NFR BLD AUTO: 88 % (ref 43–75)
NRBC BLD AUTO-RTO: 0 /100 WBCS
PLATELET # BLD AUTO: 246 THOUSANDS/UL (ref 149–390)
PMV BLD AUTO: 11 FL (ref 8.9–12.7)
POTASSIUM SERPL-SCNC: 4 MMOL/L (ref 3.5–5.3)
RBC # BLD AUTO: 3.48 MILLION/UL (ref 3.88–5.62)
SODIUM SERPL-SCNC: 137 MMOL/L (ref 136–145)
WBC # BLD AUTO: 9.29 THOUSAND/UL (ref 4.31–10.16)

## 2022-03-03 PROCEDURE — 82948 REAGENT STRIP/BLOOD GLUCOSE: CPT

## 2022-03-03 PROCEDURE — 83735 ASSAY OF MAGNESIUM: CPT | Performed by: PHYSICIAN ASSISTANT

## 2022-03-03 PROCEDURE — 94760 N-INVAS EAR/PLS OXIMETRY 1: CPT

## 2022-03-03 PROCEDURE — 99232 SBSQ HOSP IP/OBS MODERATE 35: CPT | Performed by: INTERNAL MEDICINE

## 2022-03-03 PROCEDURE — 80048 BASIC METABOLIC PNL TOTAL CA: CPT | Performed by: PHYSICIAN ASSISTANT

## 2022-03-03 PROCEDURE — 99232 SBSQ HOSP IP/OBS MODERATE 35: CPT | Performed by: PHYSICIAN ASSISTANT

## 2022-03-03 PROCEDURE — 85025 COMPLETE CBC W/AUTO DIFF WBC: CPT | Performed by: PHYSICIAN ASSISTANT

## 2022-03-03 PROCEDURE — 94640 AIRWAY INHALATION TREATMENT: CPT

## 2022-03-03 RX ADMIN — GUAIFENESIN 600 MG: 600 TABLET, EXTENDED RELEASE ORAL at 08:38

## 2022-03-03 RX ADMIN — BUDESONIDE AND FORMOTEROL FUMARATE DIHYDRATE 2 PUFF: 160; 4.5 AEROSOL RESPIRATORY (INHALATION) at 08:44

## 2022-03-03 RX ADMIN — METHYLPREDNISOLONE SODIUM SUCCINATE 40 MG: 40 INJECTION, POWDER, FOR SOLUTION INTRAMUSCULAR; INTRAVENOUS at 21:37

## 2022-03-03 RX ADMIN — MONTELUKAST 10 MG: 10 TABLET, FILM COATED ORAL at 21:36

## 2022-03-03 RX ADMIN — INSULIN LISPRO 5 UNITS: 100 INJECTION, SOLUTION INTRAVENOUS; SUBCUTANEOUS at 16:57

## 2022-03-03 RX ADMIN — POTASSIUM CHLORIDE 20 MEQ: 1500 TABLET, EXTENDED RELEASE ORAL at 17:10

## 2022-03-03 RX ADMIN — DOCUSATE SODIUM 100 MG: 100 CAPSULE ORAL at 08:38

## 2022-03-03 RX ADMIN — LEVALBUTEROL HYDROCHLORIDE 1.25 MG: 1.25 SOLUTION, CONCENTRATE RESPIRATORY (INHALATION) at 13:59

## 2022-03-03 RX ADMIN — LEVALBUTEROL HYDROCHLORIDE 1.25 MG: 1.25 SOLUTION, CONCENTRATE RESPIRATORY (INHALATION) at 07:55

## 2022-03-03 RX ADMIN — FUROSEMIDE 60 MG: 10 INJECTION, SOLUTION INTRAVENOUS at 16:57

## 2022-03-03 RX ADMIN — ISODIUM CHLORIDE 3 ML: 0.03 SOLUTION RESPIRATORY (INHALATION) at 07:55

## 2022-03-03 RX ADMIN — INSULIN GLARGINE 5 UNITS: 100 INJECTION, SOLUTION SUBCUTANEOUS at 21:36

## 2022-03-03 RX ADMIN — APIXABAN 5 MG: 5 TABLET, FILM COATED ORAL at 08:38

## 2022-03-03 RX ADMIN — ISODIUM CHLORIDE 3 ML: 0.03 SOLUTION RESPIRATORY (INHALATION) at 13:59

## 2022-03-03 RX ADMIN — INSULIN LISPRO 1 UNITS: 100 INJECTION, SOLUTION INTRAVENOUS; SUBCUTANEOUS at 06:17

## 2022-03-03 RX ADMIN — ATORVASTATIN CALCIUM 10 MG: 10 TABLET, FILM COATED ORAL at 08:38

## 2022-03-03 RX ADMIN — GUAIFENESIN 600 MG: 600 TABLET, EXTENDED RELEASE ORAL at 17:10

## 2022-03-03 RX ADMIN — APIXABAN 5 MG: 5 TABLET, FILM COATED ORAL at 21:36

## 2022-03-03 RX ADMIN — DOCUSATE SODIUM 100 MG: 100 CAPSULE ORAL at 17:10

## 2022-03-03 RX ADMIN — BUDESONIDE AND FORMOTEROL FUMARATE DIHYDRATE 2 PUFF: 160; 4.5 AEROSOL RESPIRATORY (INHALATION) at 17:00

## 2022-03-03 RX ADMIN — FUROSEMIDE 60 MG: 10 INJECTION, SOLUTION INTRAVENOUS at 08:38

## 2022-03-03 RX ADMIN — INSULIN LISPRO 2 UNITS: 100 INJECTION, SOLUTION INTRAVENOUS; SUBCUTANEOUS at 21:38

## 2022-03-03 RX ADMIN — ISODIUM CHLORIDE 3 ML: 0.03 SOLUTION RESPIRATORY (INHALATION) at 19:05

## 2022-03-03 RX ADMIN — POTASSIUM CHLORIDE 20 MEQ: 1500 TABLET, EXTENDED RELEASE ORAL at 08:38

## 2022-03-03 RX ADMIN — LEVALBUTEROL HYDROCHLORIDE 1.25 MG: 1.25 SOLUTION, CONCENTRATE RESPIRATORY (INHALATION) at 19:05

## 2022-03-03 RX ADMIN — METHYLPREDNISOLONE SODIUM SUCCINATE 40 MG: 40 INJECTION, POWDER, FOR SOLUTION INTRAMUSCULAR; INTRAVENOUS at 08:38

## 2022-03-03 RX ADMIN — METOPROLOL SUCCINATE 50 MG: 50 TABLET, EXTENDED RELEASE ORAL at 08:38

## 2022-03-03 RX ADMIN — INSULIN LISPRO 3 UNITS: 100 INJECTION, SOLUTION INTRAVENOUS; SUBCUTANEOUS at 11:14

## 2022-03-03 RX ADMIN — TIOTROPIUM BROMIDE 18 MCG: 18 CAPSULE ORAL; RESPIRATORY (INHALATION) at 08:44

## 2022-03-03 NOTE — ASSESSMENT & PLAN NOTE
Lab Results   Component Value Date    HGBA1C 7 0 (H) 11/10/2021       Recent Labs     03/02/22  0553 03/02/22  1108 03/02/22  1543 03/03/22  0548   POCGLU 185* 233* 355* 185*       Blood Sugar Average: Last 72 hrs:  (P) 980 [id number]   · Hold home oral meds while inpatient  · SSI while inpatient, initiated Lantus 5 units qHS as patient has been hyperglycemic, likely in setting of steroids  · Diabetic diet  · Hypoglycemia protocol

## 2022-03-03 NOTE — ASSESSMENT & PLAN NOTE
Lab Results   Component Value Date    EGFR 54 03/03/2022    EGFR 53 03/02/2022    EGFR 56 03/01/2022    CREATININE 1 44 (H) 03/03/2022    CREATININE 1 47 (H) 03/02/2022    CREATININE 1 41 (H) 03/01/2022   · Baseline Cr appears to be between 1 3-1 7  · Cr stable at baseline  · Caution with IV diuresis  · Monitor BMP, avoid nephrotoxins

## 2022-03-03 NOTE — PROGRESS NOTES
[facility]  Progress Note - Dwjesus manuele Carry 1965, 49 y o  male MRN: [id number]  Unit/Bed#: CW2 218-01 Encounter: [id number]  Primary Care Provider: Ricky Marquez MD   Date and time admitted to hospital: 3/1/2022  6:05 AM    * Acute on chronic diastolic heart failure [facility])  Assessment & Plan  Wt Readings from Last 3 Encounters:   03/03/22 (!) 143 kg (315 lb 8 oz)   02/22/22 (!) 143 kg (315 lb 1 6 oz)   02/12/22 (!) 145 kg (319 lb)     · Multiple recent admissions with CHF exacerbation  Presented with asthma exacerbation, worsening pedal edema bilaterally, suspect SOB to be more related to CHF with asthma exacerbation contributing  · Patient on outpatient torsemide 40 mg b i d  which was increased recently  · Follows with Dr Elsa Nielson outpatient  · Not on home oxygen  · BNP elevated 805, CXR unremarkable  · IV Lasix 60 mg BID, diuretics per Cardiology  · Patient still mildly volume overloaded, LE edema improved with still with diffuse rales on auscultation  · Cardiology following:  · Continue IV diuresis, transition to oral diuretics when volume status improves  · Given multiple admissions for CHF, discussed Cardiomems  · Daily weight, intake and output  · Salt restriction and fluid restriction    Moderate asthma with acute exacerbation  Assessment & Plan  · Patient with history of moderate asthma presented to ER with complaint of increasing shortness of breath, hypoxia, diffuse wheezing  · Patient received Solu-Medrol 125 mg IV in ER with some improvement  · Patient currently on 2L NC O2, wean as tolerated  Patient still with decreased breath sounds and mild expiratory wheezing  · IV Solu-Medrol 40 mg BID, continue today and possibly start oral prednisone taper  · Continue home inhaler Symbicort, Spiriva, Singulair, Albuterol p r n    · Will need home O2 eval per RT prior to discharge  · Respiratory protocol    Paroxysmal atrial fibrillation ([facility] [address] )  Assessment & Plan  · New diagnosis, rate controlled on metoprolol  · Anticoagulated on Eliquis    Stage 3a chronic kidney disease [facility])  Assessment & Plan  Lab Results   Component Value Date    EGFR 54 03/03/2022    EGFR 53 03/02/2022    EGFR 56 03/01/2022    CREATININE 1 44 (H) 03/03/2022    CREATININE 1 47 (H) 03/02/2022    CREATININE 1 41 (H) 03/01/2022   · Baseline Cr appears to be between 1 3-1 7  · Cr stable at baseline  · Caution with IV diuresis  · Monitor BMP, avoid nephrotoxins    VICKY (obstructive sleep apnea)  Assessment & Plan  · CPAP at bedtime    Morbid obesity (HCC)  Assessment & Plan  · Lifestyle modification    Type 2 diabetes mellitus with hyperglycemia [facility])  Assessment & Plan  Lab Results   Component Value Date    HGBA1C 7 0 (H) 11/10/2021       Recent Labs     03/02/22  0553 03/02/22  1108 03/02/22  1543 03/03/22  0548   POCGLU 185* 233* 355* 185*       Blood Sugar Average: Last 72 hrs:  (P) 717 [id number]   · Hold home oral meds while inpatient  · SSI while inpatient, initiated Lantus 5 units qHS as patient has been hyperglycemic, likely in setting of steroids  · Diabetic diet  · Hypoglycemia protocol    Essential hypertension  Assessment & Plan  · Blood pressure stable, continue home medication metoprolol  · Also on IV Lasix      VTE Pharmacologic Prophylaxis: VTE Score: 6 High Risk (Score >/= 5) - Pharmacological DVT Prophylaxis Ordered: apixaban (Eliquis)  Sequential Compression Devices Ordered  Patient Centered Rounds: I performed bedside rounds with nursing staff today  Discussions with Specialists or Other Care Team Provider: None    Education and Discussions with Family / Patient: Attempted to update  (wife) via phone  Unable to contact  Time Spent for Care: 45 minutes  More than 50% of total time spent on counseling and coordination of care as described above      Current Length of Stay: 1 day(s)  Current Patient Status: Inpatient   Certification Statement: The patient will continue to require additional inpatient hospital stay due to IV diuresis  Discharge Plan: Anticipate discharge in 24-48 hrs to home  Code Status: Level 1 - Full Code    Subjective:   Patient is seen at bedside this a m , states his SOB is slightly worse today compared to yesterday, with productive cough early this a m , otherwise denies CP, palpitations, dizziness  Objective:     Vitals:   Temp (24hrs), Av 8 °F (36 6 °C), Min:97 6 °F (36 4 °C), Max:98 3 °F (36 8 °C)    Temp:  [97 6 °F (36 4 °C)-98 3 °F (36 8 °C)] 97 6 °F (36 4 °C)  HR:  [67-91] 90  Resp:  [18] 18  BP: (110-120)/(51-59) 115/59  SpO2:  [94 %-97 %] 95 %  Body mass index is 42 79 kg/m²  Input and Output Summary (last 24 hours): Intake/Output Summary (Last 24 hours) at 3/3/2022 1025  Last data filed at 3/3/2022 1010  Gross per 24 hour   Intake 1020 ml   Output 1115 ml   Net -95 ml       Physical Exam:   Physical Exam     Additional Data:     Labs:  Results from last 7 days   Lab Units 22  0521   WBC Thousand/uL 9 29   HEMOGLOBIN g/dL 10 3*   HEMATOCRIT % 31 0*   PLATELETS Thousands/uL 246   NEUTROS PCT % 88*   LYMPHS PCT % 7*   MONOS PCT % 4   EOS PCT % 0     Results from last 7 days   Lab Units 22  0521 22  0530 22  0636   SODIUM mmol/L 137   < > 141   POTASSIUM mmol/L 4 0   < > 3 2*   CHLORIDE mmol/L 100   < > 100   CO2 mmol/L 31   < > 33*   BUN mg/dL 27*   < > 15   CREATININE mg/dL 1 44*   < > 1 41*   ANION GAP mmol/L 6   < > 8   CALCIUM mg/dL 7 2*   < > 7 6*   ALBUMIN g/dL  --   --  2 7*   TOTAL BILIRUBIN mg/dL  --   --  0 80   ALK PHOS U/L  --   --  106   ALT U/L  --   --  13   AST U/L  --   --  12   GLUCOSE RANDOM mg/dL 195*   < > 118    < > = values in this interval not displayed           Results from last 7 days   Lab Units 22  0548 22  1543 22  1108 22  0553 22  2147 22  1620   POC GLUCOSE mg/dl 185* 355* 233* 185* 314* 332*         Results from last 7 days   Lab Units 03/02/22  0530 03/01/22  0636   PROCALCITONIN ng/ml 0 11 0 06       Lines/Drains:  Invasive Devices  Report    Peripheral Intravenous Line            Peripheral IV 03/01/22 Right Forearm 1 day                  Telemetry:  Telemetry Orders (From admission, onward)             48 Hour Telemetry Monitoring  Continuous x 48 hours        References:    Telemetry Guidelines   Question:  Reason for 48 Hour Telemetry  Answer:  Acute Decompensated CHF (continuous diuretic infusion or total diuretic dose > 200 mg daily, associated electrolyte derangement, ionotropic drip, history of ventricular arrhythmia, or new EF <35%)                 Telemetry Reviewed: Normal Sinus Rhythm  Indication for Continued Telemetry Use: No indication for continued use  Will discontinue                Imaging: Reviewed radiology reports from this admission including: chest xray and chest CT scan    Recent Cultures (last 7 days):         Last 24 Hours Medication List:   Current Facility-Administered Medications   Medication Dose Route Frequency Provider Last Rate    acetaminophen  650 mg Oral Q6H PRN Nick Crane MD      albuterol  2 puff Inhalation Q4H PRN Nick Crane MD      aluminum-magnesium hydroxide-simethicone  30 mL Oral Q6H PRN Nick Crane MD      apixaban  5 mg Oral BID Nick Crane MD      atorvastatin  10 mg Oral Daily Nick Crane MD      budesonide-formoterol  2 puff Inhalation BID Nick Crane MD      docusate sodium  100 mg Oral BID Nick Crane MD      furosemide  60 mg Intravenous BID (diuretic) Moni Rodríguez PA-C      guaiFENesin  600 mg Oral BID Nick Crane MD      insulin glargine  5 Units Subcutaneous HS Debby Freeman PA-C      insulin lispro  1-6 Units Subcutaneous TID AC Devang Ingram MD      insulin lispro  1-6 Units Subcutaneous HS Nick Crane MD      levalbuterol  1 25 mg Nebulization TID Nick Crane MD      melatonin  3 mg Oral HS PRN Jerica Dowling MD      methylPREDNISolone sodium succinate  40 mg Intravenous Q12H [facility], MD      metoprolol succinate  50 mg Oral Daily Devang Ingram MD      montelukast  10 mg Oral HS Devang Ingram MD      ondansetron  4 mg Intravenous Q6H PRN Nick Crane MD      oxyCODONE-acetaminophen  1 tablet Oral Q4H PRN Nick Crane MD      potassium chloride  20 mEq Oral BID Nick Crane MD      sodium chloride  3 mL Nebulization TID Nick Crane MD      tiotropium  18 mcg Inhalation Daily Nick Crane MD          Today, Patient Was Seen By: Breanne Valdez PA-C    **Please Note: This note may have been constructed using a voice recognition system  **

## 2022-03-03 NOTE — RESPIRATORY THERAPY NOTE
RT Protocol Note  Kit Hernandez 49 y o  male MRN: [id number]  Unit/Bed#: CW2 218-01 Encounter: [id number]    Assessment    Principal Problem:    Acute on chronic diastolic heart failure ([facility] )  Active Problems:    Essential hypertension    Type 2 diabetes mellitus with hyperglycemia (HCC)    Morbid obesity (HCC)    VICKY (obstructive sleep apnea)    Moderate asthma with acute exacerbation    Paroxysmal atrial fibrillation [facility])      Home Pulmonary Medications:         Past Medical History:   Diagnosis Date    Acute gastritis without hemorrhage     last assessed 3/24/17    Asthma     Diabetes mellitus ([facility] )     Gastric bypass status for obesity     Hyperlipidemia     Hypertension     Impaired fasting glucose     last assessed 5/10/17    Obesity     Viral gastroenteritis     last assessed 11/4/16     Social History     Socioeconomic History    Marital status: /Civil Union     Spouse name: Not on file    Number of children: Not on file    Years of education: Not on file    Highest education level: Not on file   Occupational History    Not on file   Tobacco Use    Smoking status: Light Tobacco Smoker     Types: Cigars    Smokeless tobacco: Current User    Tobacco comment: rare/hasn't smoked in 1 year   Vaping Use    Vaping Use: Never used   Substance and Sexual Activity    Alcohol use:  Yes     Alcohol/week: 1 0 standard drink     Types: 1 Standard drinks or equivalent per week     Comment: social    Drug use: No    Sexual activity: Yes     Partners: Female     Birth control/protection: None   Other Topics Concern    Not on file   Social History Narrative    Not on file     Social Determinants of Health     Financial Resource Strain: Not on file   Food Insecurity: Not on file   Transportation Needs: Not on file   Physical Activity: Not on file   Stress: Not on file   Social Connections: Not on file   Intimate Partner Violence: Not on file   Housing Stability: Not on file       Subjective Objective    Physical Exam:   Assessment Type: During-treatment  General Appearance: Awake,Alert  Respiratory Pattern: Dyspnea with exertion  Chest Assessment: Chest expansion symmetrical  Bilateral Breath Sounds: Expiratory wheezes  Cough: Productive    Vitals:  Blood pressure 110/54, pulse 91, temperature 97 7 °F (36 5 °C), resp  rate 18, height 6' (1 829 m), weight (!) 143 kg (315 lb 8 oz), SpO2 97 %  Imaging and other studies: I have personally reviewed pertinent reports  Plan    Respiratory Plan: Mild Distress pathway        Resp Comments: pt given udn  will contionue to moniter pt

## 2022-03-03 NOTE — ASSESSMENT & PLAN NOTE
· Patient with history of moderate asthma presented to ER with complaint of increasing shortness of breath, hypoxia, diffuse wheezing  · Patient received Solu-Medrol 125 mg IV in ER with some improvement  · Patient currently on 2L NC O2, wean as tolerated  Patient still with decreased breath sounds and mild expiratory wheezing  · IV Solu-Medrol 40 mg BID, continue today and possibly start oral prednisone taper  · Continue home inhaler Symbicort, Spiriva, Singulair, Albuterol p r n    · Will need home O2 eval per RT prior to discharge  · Respiratory protocol

## 2022-03-03 NOTE — CASE MANAGEMENT
Case Management Assessment & Discharge Planning Note    Patient name [facility]  Location 2 218/CW2 218-01 MRN [id number]  : 1965 Date 3/3/2022       Current Admission Date: 3/1/2022  Current Admission Diagnosis:Acute on chronic diastolic heart failure [facility])   Patient Active Problem List    Diagnosis Date Noted    Paroxysmal atrial fibrillation ([facility] ) 2022    Stage 3a chronic kidney disease ([facility] ) 2021    Hypokalemia 2021    Acute on chronic diastolic heart failure ([facility] ) 2021    Moderate asthma with acute exacerbation 10/11/2021    Dyspnea on exertion 10/11/2021    Hyponatremia 2021    Cardiomyopathy ([facility] ) 2021    Well adult exam 2021    Decreased left ventricular systolic function     Bilateral leg edema 2020    Edema of both feet 2020    VICKY (obstructive sleep apnea)     Daytime sleepiness 2019    Mixed hyperlipidemia 2019    Morbid obesity ([facility] ) 2019    Vitamin B12 deficiency 2019    Vitamin D deficiency 2019    Knee sprain, bilateral 2018    Primary osteoarthritis of both knees 2018    Irritable bowel syndrome with diarrhea 2018    Essential hypertension 2018    Type 2 diabetes mellitus with hyperglycemia ([facility] ) 2018      LOS (days): 1  Geometric Mean LOS (GMLOS) (days): 3 80  Days to GMLOS:2 8     OBJECTIVE:    Risk of Unplanned Readmission Score: 36      Current admission status: Inpatient    Preferred Pharmacy:   [address], [address]  [address] [address]  [address] [facility] [address]  Phone: [phone] Fax: [phone]    Primary Care Provider: Sharron Hawley MD    Primary Insurance: Haley Johnson  Secondary Insurance:     ASSESSMENT:  [address] [place] [address] Sonu Case 125 Representative - Spouse   Primary Phone: [phone] [facility])  Mobile Phone: [phone]               Advance Directives  Does patient have a [address]?: No  Was patient offered paperwork?: Yes  Does patient currently have a Health Care decision maker?: Yes, please see Health Care Proxy section  Does patient have Advance Directives?: No  Was patient offered paperwork?: Yes (Pt declined, stating he is working on obtaining a POA with his 's assistance )  Primary Contact: Spouse Lizzie Eubanks    Patient Information  Admitted from[de-identified] Home  Mental Status: Alert  During Assessment patient was accompanied by: Not accompanied during assessment  Assessment information provided by[de-identified] Patient  Primary Caregiver: Self  Support Systems: Spouse/significant other,Daughter  What city do you live in?: [address] entry access options   Select all that apply : Stairs  Number of steps to enter home : 6  Do the steps have railings?: Yes  Type of Current Residence: [address] [place] home  Upon entering residence, is there a bedroom on the main floor (no further steps)?: No  A bedroom is located on the following floor levels of residence (select all that apply):: 2nd Floor  Upon entering residence, is there a bathroom on the main floor (no further steps)?: No  Indicate which floors of current residence have a bathroom (select all the apply):: 2nd Floor  Number of steps to 2nd floor from main floor: One Flight  In the last 12 months, was there a time when you were not able to pay the mortgage or rent on time?: No  In the last 12 months, was there a time when you did not have a steady place to sleep or slept in a shelter (including now)?: No  Homeless/housing insecurity resource given?: N/A  Living Arrangements: Lives w/ Spouse/significant other,Lives w/ Daughter  Is patient a ?: No    Activities of Daily Living Prior to Admission  Functional Status: Independent  Completes ADLs independently?: Yes  Ambulates independently?: Yes  Does patient use assisted devices?: No  Does patient currently own DME?: No  Does patient have a history of Outpatient Therapy (PT/OT)?: No  Does the patient have a history of Short-Term Rehab?: No  Does patient have a history of HHC?: No  Does patient currently have [facility]?: No    Patient Information Continued  Income Source: Employed  Does patient have prescription coverage?: Yes  Within the past 12 months, you worried that your food would run out before you got the money to buy more : Never true  Within the past 12 months, the food you bought just didnt last and you didnt have money to get more : Never true  Food insecurity resource given?: N/A  Does patient receive dialysis treatments?: No  Does patient have a history of substance abuse?: No  Does patient have a history of Mental Health Diagnosis?: No    Means of Transportation  Means of Transport to Appts[de-identified] Drives Self  In the past 12 months, has lack of transportation kept you from medical appointments or from getting medications?: No  In the past 12 months, has lack of transportation kept you from meetings, work, or from getting things needed for daily living?: No  Was application for public transport provided?: N/A    DISCHARGE DETAILS:    Discharge planning discussed with[de-identified] Patient at bedside  Freedom of Choice: Yes     CM contacted family/caregiver?: No- see comments (Pt A&Ox4 )  Were Treatment Team discharge recommendations reviewed with patient/caregiver?: Yes  Did patient/caregiver verbalize understanding of patient care needs?: Yes  Were patient/caregiver advised of the risks associated with not following Treatment Team discharge recommendations?: Yes    Contacts  Patient Contacts: Self[place]on  Contact Method:  In Person  Reason/Outcome: Discharge [address]         Is the patient interested in [facility] at discharge?: Yes (Pt agreeable to [facility] if he goes home w/ oxygen -- Pt does not have a preference regarding [facility] agency, is agreeablet to [facility] )    DME Referral Provided  Referral made for DME?: No    Treatment Team Recommendation: Home  Discharge Destination Plan[de-identified] Home      Additional Comments: Awaiting O2 evaluation to determine home oxygen needs -- CM reviewed POC program, as well as recommendation for VNA if oxygen is needed post-d/c  He is in agreement

## 2022-03-03 NOTE — ASSESSMENT & PLAN NOTE
Wt Readings from Last 3 Encounters:   03/03/22 (!) 143 kg (315 lb 8 oz)   02/22/22 (!) 143 kg (315 lb 1 6 oz)   02/12/22 (!) 145 kg (319 lb)     · Multiple recent admissions with CHF exacerbation  Presented with asthma exacerbation, worsening pedal edema bilaterally, suspect SOB to be more related to CHF with asthma exacerbation contributing  · Patient on outpatient torsemide 40 mg b i d  which was increased recently  · Follows with Dr Tito Carter outpatient    · Not on home oxygen  · BNP elevated 805, CXR unremarkable  · IV Lasix 60 mg BID, diuretics per Cardiology  · Patient still mildly volume overloaded, LE edema improved with still with diffuse rales on auscultation  · Cardiology following:  · Continue IV diuresis, transition to oral diuretics when volume status improves  · Given multiple admissions for CHF, discussed Cardiomems  · Daily weight, intake and output  · Salt restriction and fluid restriction

## 2022-03-03 NOTE — PROGRESS NOTES
Cardiology Progress Note - Walker Cabello 49 y o  male MRN: [id number]    Unit/Bed#: CW2 218-01 Encounter: [id number]      Assessment & Plan:  Assessment and Plan:    Acute on chronic diastolic heart failure (HCC)  -TTE 11/12/2021 LVEF 55%, LVIDd 6 0 cm, grade 1 diastolic dysfunction, normal RV, trace TR  -home diuretic regimen torsemide 40 mg p o  B i d   -heart failure regiment Toprol XL 50 mg daily  -current diuretic regiment Lasix 60 mg IV b i d   -appears to be a mixed picture of a heart failure exacerbation with a asthma exacerbation    Moderate asthma with acute exacerbation  -started on IV Solu-Medrol  -PFT's 10/22/2021 FVC 61%, FEV1 39%, FEV1/FVC ratio 49%, TLC 98%, residual volume 183%, DLCO 65%  -does also appear to have a component of asthma exacerbation contributing to the shortness of breath and hypoxia    Paroxysmal atrial fibrillation ([facility] )  -currently anticoagulated on Eliquis 5 mg b i d   - controlled with Toprol XL 50 mg daily    Chronic kidney disease stage III  -baseline creatinine appears to be around 1 4-1 6  -appears to be at baseline currently    Essential hypertension  -currently on Toprol XL 50 mg daily    Hyperlipidemia  -currently on Lipitor 10 mg daily    Type 2 diabetes mellitus with hyperglycemia ([facility] )  -hemoglobin A1c 7 0 on 11/10/2021    Morbid obesity ([facility] )  -history of gastric bypass around 2007    VICKY (obstructive sleep apnea)  -has CPAP machine at home, but reports it is currently recalled so has not been using it for several months now     Plan:  -unclear how much output he had with the 60 mg IV Lasix b i d , however will keep with current regiment for now, does not appear to have significant volume on physical exam  -recommend continued treatment of asthma exacerbation as well       Subjective:   No significant events overnight  He reports having a coughing fit lasting around 3 minutes this morning and since then reports he has been more short of breath    Otherwise reports urinating a little more with the IV diuretics compared to his home oral diuretics  Denies chest pain, abdominal pain, nausea, vomiting, fever, chills, headache, dizziness or palpitations  Objective:     Vitals: Blood pressure 129/68, pulse 68, temperature 97 8 °F (36 6 °C), resp  rate 20, height 6' (1 829 m), weight (!) 143 kg (315 lb 8 oz), SpO2 94 %  , Body mass index is 42 79 kg/m² ,   Orthostatic Blood Pressures      Most Recent Value   Blood Pressure 129/68 filed at 03/03/2022 1050   Patient Position - Orthostatic VS Lying filed at 03/02/2022 2225            Intake/Output Summary (Last 24 hours) at 3/3/2022 1140  Last data filed at 3/3/2022 1010  Gross per 24 hour   Intake 660 ml   Output 1115 ml   Net -455 ml           Physical Exam:    GEN: Johnnie Canales appears well, alert and oriented x 3, pleasant and cooperative   HEENT: anicteric, mucous membranes moist  NECK:  No significant JVD   HEART:  Rate and regular rhythm, with occasional ectopic beats, no significant audible murmurs  LUNGS:  Diffuse bilateral expiratory wheezes, no significant crackles  ABDOMEN: normal bowel sounds, soft, no tenderness, no distention  EXTREMITIES: peripheral pulses normal; + lower extremity nonpitting edema  NEURO: no focal findings   SKIN: normal without suspicious lesions on exposed skin      Current Facility-Administered Medications:     acetaminophen (TYLENOL) tablet 650 mg, 650 mg, Oral, Q6H PRN, Hugh Estevez MD, 650 mg at 03/01/22 2017    albuterol (PROVENTIL HFA,VENTOLIN HFA) inhaler 2 puff, 2 puff, Inhalation, Q4H PRN, Hugh Estevez MD    aluminum-magnesium hydroxide-simethicone (MYLANTA) oral suspension 30 mL, 30 mL, Oral, Q6H PRN, Hugh Estevez MD    apixaban (ELIQUIS) tablet 5 mg, 5 mg, Oral, BID, Devang Ingram MD, 5 mg at 03/03/22 0838    atorvastatin (LIPITOR) tablet 10 mg, 10 mg, Oral, Daily, Devang Ingram MD, 10 mg at 03/03/22 0838    budesonide-formoterol (SYMBICORT) 160-4 5 mcg/act inhaler 2 puff, 2 puff, Inhalation, BID, Cristo Viramontes MD, 2 puff at 03/03/22 0844    docusate sodium (COLACE) capsule 100 mg, 100 mg, Oral, BID, Cristo Viramontes MD, 100 mg at 03/03/22 0838    furosemide (LASIX) injection 60 mg, 60 mg, Intravenous, BID (diuretic), Catrachita Boswell PA-C, 60 mg at 03/03/22 0838    guaiFENesin (MUCINEX) 12 hr tablet 600 mg, 600 mg, Oral, BID, Cristo Viramontes MD, 600 mg at 03/03/22 0838    insulin glargine (LANTUS) subcutaneous injection 5 Units 0 05 mL, 5 Units, Subcutaneous, HS, Debby Freeman PA-C, 5 Units at 03/02/22 2210    insulin lispro (HumaLOG) 100 units/mL subcutaneous injection 1-6 Units, 1-6 Units, Subcutaneous, TID AC, 3 Units at 03/03/22 1114 **AND** Fingerstick Glucose (POCT), , , TID AC, Devang Ingram MD    insulin lispro (HumaLOG) 100 units/mL subcutaneous injection 1-6 Units, 1-6 Units, Subcutaneous, HS, Cristo Viramontes MD, 6 Units at 03/02/22 2213    levalbuterol ([place] D[place]) inhalation solution 1 25 mg, 1 25 mg, Nebulization, TID, Cristo Viramontes MD, 1 25 mg at 03/03/22 0755    melatonin tablet 3 mg, 3 mg, Oral, HS PRN, Rocío Reynolds MD    methylPREDNISolone sodium succinate (Solu-MEDROL) injection 40 mg, 40 mg, Intravenous, Q12H [address], Cristo Viramontes MD, 40 mg at 03/03/22 0838    metoprolol succinate (TOPROL-XL) 24 hr tablet 50 mg, 50 mg, Oral, Daily, Devang Ingram MD, 50 mg at 03/03/22 0838    montelukast (SINGULAIR) tablet 10 mg, 10 mg, Oral, HS, Cristo Viramontes MD, 10 mg at 03/02/22 2210    ondansetron (ZOFRAN) injection 4 mg, 4 mg, Intravenous, Q6H PRN, Cristo Viramontes MD    oxyCODONE-acetaminophen (PERCOCET) 5-325 mg per tablet 1 tablet, 1 tablet, Oral, Q4H PRN, Cristo Viramontes MD    potassium chloride (K-DUR,KLOR-CON) CR tablet 20 mEq, 20 mEq, Oral, BID, Devang Ingram MD, 20 mEq at 03/03/22 0838    sodium chloride 0 9 % inhalation solution 3 mL, 3 mL, Nebulization, TID, Devang Ingram MD, 3 mL at 03/03/22 0755    tiotropium (SPIRIVA) capsule for inhaler 18 mcg, 18 mcg, Inhalation, Daily, Devang Walt Moseley MD, 18 mcg at 03/03/22 0844    Labs & Results:    Lab Results   Component Value Date    TROPONINI <0 02 09/22/2021    TROPONINI <0 02 07/19/2021    TROPONINI <0 02 07/19/2021       Lab Results   Component Value Date    CALCIUM 7 2 (L) 03/03/2022    K 4 0 03/03/2022    CO2 31 03/03/2022     03/03/2022    BUN 27 (H) 03/03/2022    CREATININE 1 44 (H) 03/03/2022       Lab Results   Component Value Date    WBC 9 29 03/03/2022    HGB 10 3 (L) 03/03/2022    HCT 31 0 (L) 03/03/2022    MCV 89 03/03/2022     03/03/2022           No results found for: CHOL  Lab Results   Component Value Date    HDL 66 10/01/2021    HDL 46 01/28/2020     Lab Results   Component Value Date    LDLCALC 73 10/01/2021    LDLCALC 144 (H) 01/28/2020     Lab Results   Component Value Date    TRIG 90 10/01/2021    TRIG 126 01/28/2020       Lab Results   Component Value Date    ALT 13 03/01/2022    AST 12 03/01/2022         EKG personally reviewed by )Russell Oquendo MD  No acute changes   TELE: No significant arrhythmias seen on telemetry review

## 2022-03-03 NOTE — PLAN OF CARE
Problem: PAIN - ADULT  Goal: Verbalizes/displays adequate comfort level or baseline comfort level  Description: Interventions:  - Encourage patient to monitor pain and request assistance  - Assess pain using appropriate pain scale  - Administer analgesics based on type and severity of pain and evaluate response  - Implement non-pharmacological measures as appropriate and evaluate response  - Consider cultural and social influences on pain and pain management  - Notify physician/advanced practitioner if interventions unsuccessful or patient reports new pain  Outcome: Progressing     Problem: INFECTION - ADULT  Goal: Absence or prevention of progression during hospitalization  Description: INTERVENTIONS:  - Assess and monitor for signs and symptoms of infection  - Monitor lab/diagnostic results  - Monitor all insertion sites, i e  indwelling lines, tubes, and drains  - Monitor endotracheal if appropriate and nasal secretions for changes in amount and color  - [place] appropriate cooling/warming therapies per order  - Administer medications as ordered  - Instruct and encourage patient and family to use good hand hygiene technique  - Identify and instruct in appropriate isolation precautions for identified infection/condition  Outcome: Progressing  Goal: Absence of fever/infection during neutropenic period  Description: INTERVENTIONS:  - Monitor WBC    Outcome: Progressing     Problem: SAFETY ADULT  Goal: Patient will remain free of falls  Description: INTERVENTIONS:  - Educate patient/family on patient safety including physical limitations  - Instruct patient to call for assistance with activity   - Consult OT/PT to assist with strengthening/mobility   - Keep Call bell within reach  - Keep bed low and locked with side rails adjusted as appropriate  - Keep care items and personal belongings within reach  - Initiate and maintain comfort rounds  - Make Fall Risk Sign visible to staff  - Apply yellow socks and bracelet for high fall risk patients  - Consider moving patient to room near nurses station  Outcome: Progressing

## 2022-03-03 NOTE — UTILIZATION REVIEW
Continued Stay Review    Date 2:  03/03/2022                        Current Patient Class: Inpatient  Current Level of Care: Med/Surg    HPI:56 y o  male initially admitted on 03/01/2022    Assessment/Plan:  Acute on Chronic Diastolic Heart Failure with asthma exacerbation  Continue IV lasix  Daily weight   I&O  Currently O2 @ 2L via NC, wean as tolerated  Continue IV solumedrol  Monitor & trend BMP  Avoid nephrotoxins        VTE Pharmacologic Prophylaxis: VTE Score: 6 High Risk (Score >/= 5) - Pharmacological DVT Prophylaxis Ordered: apixaban (Eliquis)  Sequential Compression Devices Ordered      Vital Signs: /65   Pulse 79   Temp 97 8 °F (36 6 °C)   Resp 18   Ht 6' (1 829 m)   Wt (!) 143 kg (315 lb 8 oz)   SpO2 94%   BMI 42 79 kg/m²      Pertinent Labs/Diagnostic Results:   Results from last 7 days   Lab Units 03/01/22  1128   SARS-COV-2  Negative     Results from last 7 days   Lab Units 03/03/22  0521 03/02/22  0530 03/01/22  0615   WBC Thousand/uL 9 29 9 47 12 31*   HEMOGLOBIN g/dL 10 3* 10 8* 12 4   HEMATOCRIT % 31 0* 33 7* 38 3   PLATELETS Thousands/uL 246 269 379   NEUTROS ABS Thousands/µL 8 10* 7 76* 9 22*     Results from last 7 days   Lab Units 03/03/22  0521 03/02/22  0530 03/01/22  0636   SODIUM mmol/L 137 135* 141   POTASSIUM mmol/L 4 0 3 5 3 2*   CHLORIDE mmol/L 100 99* 100   CO2 mmol/L 31 30 33*   ANION GAP mmol/L 6 6 8   BUN mg/dL 27* 24 15   CREATININE mg/dL 1 44* 1 47* 1 41*   EGFR ml/min/1 73sq m 54 53 56   CALCIUM mg/dL 7 2* 7 2* 7 6*   MAGNESIUM mg/dL 2 0  --   --      Results from last 7 days   Lab Units 03/01/22  0636   AST U/L 12   ALT U/L 13   ALK PHOS U/L 106   TOTAL PROTEIN g/dL 7 4   ALBUMIN g/dL 2 7*   TOTAL BILIRUBIN mg/dL 0 80     Results from last 7 days   Lab Units 03/03/22  1608 03/03/22  1046 03/03/22  0548 03/02/22  1543 03/02/22  1108 03/02/22  0553 03/01/22  2147 03/01/22  1620   POC GLUCOSE mg/dl 311* 269* 185* 355* 233* 185* 314* 332*     Results from last 7 days   Lab Units 03/03/22  0521 03/02/22  0530 03/01/22  0636   GLUCOSE RANDOM mg/dL 195* 171* 118     Results from last 7 days   Lab Units 03/01/22  0640   PH OMERO  7 402*   PCO2 OMERO mm Hg 53 9*   PO2 OMERO mm Hg 67 5*   HCO3 OMERO mmol/L 32 8*   BASE EXC OMERO mmol/L 6 6   O2 CONTENT OMERO ml/dL 16 7   O2 HGB, VENOUS % 91 0*     Results from last 7 days   Lab Units 03/01/22  1128 03/01/22  0902 03/01/22  0637   HS TNI 0HR ng/L  --   --  18   HS TNI 2HR ng/L  --  16  --    HSTNI D2 ng/L  --  -2  --    HS TNI 4HR ng/L 16  --   --    HSTNI D4 ng/L -2  --   --      Results from last 7 days   Lab Units 03/02/22  0530 03/01/22  0636   PROCALCITONIN ng/ml 0 11 0 06     Results from last 7 days   Lab Units 03/01/22  0636   NT-PRO BNP pg/mL 805*     Results from last 7 days   Lab Units 03/01/22  1128   INFLUENZA A PCR  Negative   INFLUENZA B PCR  Negative   RSV PCR  Negative     Medications:   Scheduled Medications:  apixaban, 5 mg, Oral, BID  atorvastatin, 10 mg, Oral, Daily  budesonide-formoterol, 2 puff, Inhalation, BID  docusate sodium, 100 mg, Oral, BID  furosemide, 60 mg, Intravenous, BID (diuretic)  guaiFENesin, 600 mg, Oral, BID  insulin glargine, 5 Units, Subcutaneous, HS  insulin lispro, 1-6 Units, Subcutaneous, TID AC  insulin lispro, 1-6 Units, Subcutaneous, HS  levalbuterol, 1 25 mg, Nebulization, TID  methylPREDNISolone sodium succinate, 40 mg, Intravenous, Q12H ANA  metoprolol succinate, 50 mg, Oral, Daily  montelukast, 10 mg, Oral, HS  potassium chloride, 20 mEq, Oral, BID  sodium chloride, 3 mL, Nebulization, TID  tiotropium, 18 mcg, Inhalation, Daily      Continuous IV Infusions:     PRN Meds:  acetaminophen, 650 mg, Oral, Q6H PRN  albuterol, 2 puff, Inhalation, Q4H PRN  aluminum-magnesium hydroxide-simethicone, 30 mL, Oral, Q6H PRN  melatonin, 3 mg, Oral, HS PRN  ondansetron, 4 mg, Intravenous, Q6H PRN  oxyCODONE-acetaminophen, 1 tablet, Oral, Q4H PRN        Discharge Plan: D    Network Utilization Review Department  ATTENTION: Please call with any questions or concerns to [phone] and carefully listen to the prompts so that you are directed to the right person  All voicemails are confidential   Emy Lott all requests for admission clinical reviews, approved or denied determinations and any other requests to dedicated fax number below belonging to the campus where the patient is receiving treatment   List of dedicated fax numbers for the Facilities:  [address] DENIALS (Administrative/Medical Necessity) [phone]   [address] (Maternity/NICU/Pediatrics) [phone]   [address]  [address] [facility] [place] [address] [address] [place] [address] [address] [facility] [place] [address] [phone]

## 2022-03-04 LAB
ANION GAP SERPL CALCULATED.3IONS-SCNC: 6 MMOL/L (ref 4–13)
BASOPHILS # BLD AUTO: 0.01 THOUSANDS/ΜL (ref 0–0.1)
BASOPHILS NFR BLD AUTO: 0 % (ref 0–1)
BUN SERPL-MCNC: 24 MG/DL (ref 5–25)
CALCIUM SERPL-MCNC: 7.1 MG/DL (ref 8.3–10.1)
CHLORIDE SERPL-SCNC: 99 MMOL/L (ref 100–108)
CO2 SERPL-SCNC: 31 MMOL/L (ref 21–32)
CREAT SERPL-MCNC: 1.43 MG/DL (ref 0.6–1.3)
EOSINOPHIL # BLD AUTO: 0 THOUSAND/ΜL (ref 0–0.61)
EOSINOPHIL NFR BLD AUTO: 0 % (ref 0–6)
ERYTHROCYTE [DISTWIDTH] IN BLOOD BY AUTOMATED COUNT: 15.5 % (ref 11.6–15.1)
GFR SERPL CREATININE-BSD FRML MDRD: 55 ML/MIN/1.73SQ M
GLUCOSE SERPL-MCNC: 196 MG/DL (ref 65–140)
GLUCOSE SERPL-MCNC: 236 MG/DL (ref 65–140)
GLUCOSE SERPL-MCNC: 237 MG/DL (ref 65–140)
GLUCOSE SERPL-MCNC: 260 MG/DL (ref 65–140)
HCT VFR BLD AUTO: 32.8 % (ref 36.5–49.3)
HGB BLD-MCNC: 10.9 G/DL (ref 12–17)
IMM GRANULOCYTES # BLD AUTO: 0.07 THOUSAND/UL (ref 0–0.2)
IMM GRANULOCYTES NFR BLD AUTO: 1 % (ref 0–2)
LYMPHOCYTES # BLD AUTO: 0.72 THOUSANDS/ΜL (ref 0.6–4.47)
LYMPHOCYTES NFR BLD AUTO: 8 % (ref 14–44)
MAGNESIUM SERPL-MCNC: 2.1 MG/DL (ref 1.6–2.6)
MCH RBC QN AUTO: 29.7 PG (ref 26.8–34.3)
MCHC RBC AUTO-ENTMCNC: 33.2 G/DL (ref 31.4–37.4)
MCV RBC AUTO: 89 FL (ref 82–98)
MONOCYTES # BLD AUTO: 0.52 THOUSAND/ΜL (ref 0.17–1.22)
MONOCYTES NFR BLD AUTO: 6 % (ref 4–12)
NEUTROPHILS # BLD AUTO: 7.23 THOUSANDS/ΜL (ref 1.85–7.62)
NEUTS SEG NFR BLD AUTO: 85 % (ref 43–75)
NRBC BLD AUTO-RTO: 0 /100 WBCS
PLATELET # BLD AUTO: 228 THOUSANDS/UL (ref 149–390)
PMV BLD AUTO: 11.2 FL (ref 8.9–12.7)
POTASSIUM SERPL-SCNC: 4.1 MMOL/L (ref 3.5–5.3)
RBC # BLD AUTO: 3.67 MILLION/UL (ref 3.88–5.62)
SODIUM SERPL-SCNC: 136 MMOL/L (ref 136–145)
WBC # BLD AUTO: 8.55 THOUSAND/UL (ref 4.31–10.16)

## 2022-03-04 PROCEDURE — 83735 ASSAY OF MAGNESIUM: CPT | Performed by: PHYSICIAN ASSISTANT

## 2022-03-04 PROCEDURE — 94640 AIRWAY INHALATION TREATMENT: CPT

## 2022-03-04 PROCEDURE — 80048 BASIC METABOLIC PNL TOTAL CA: CPT | Performed by: PHYSICIAN ASSISTANT

## 2022-03-04 PROCEDURE — 82948 REAGENT STRIP/BLOOD GLUCOSE: CPT

## 2022-03-04 PROCEDURE — 85025 COMPLETE CBC W/AUTO DIFF WBC: CPT | Performed by: PHYSICIAN ASSISTANT

## 2022-03-04 PROCEDURE — 99232 SBSQ HOSP IP/OBS MODERATE 35: CPT | Performed by: PHYSICIAN ASSISTANT

## 2022-03-04 PROCEDURE — 99232 SBSQ HOSP IP/OBS MODERATE 35: CPT | Performed by: INTERNAL MEDICINE

## 2022-03-04 PROCEDURE — 94760 N-INVAS EAR/PLS OXIMETRY 1: CPT

## 2022-03-04 RX ORDER — BUMETANIDE 0.25 MG/ML
2 INJECTION, SOLUTION INTRAMUSCULAR; INTRAVENOUS 2 TIMES DAILY
Status: DISCONTINUED | OUTPATIENT
Start: 2022-03-04 | End: 2022-03-06

## 2022-03-04 RX ORDER — INSULIN GLARGINE 100 [IU]/ML
10 INJECTION, SOLUTION SUBCUTANEOUS
Status: DISCONTINUED | OUTPATIENT
Start: 2022-03-04 | End: 2022-03-06

## 2022-03-04 RX ADMIN — BUDESONIDE AND FORMOTEROL FUMARATE DIHYDRATE 2 PUFF: 160; 4.5 AEROSOL RESPIRATORY (INHALATION) at 18:12

## 2022-03-04 RX ADMIN — DOCUSATE SODIUM 100 MG: 100 CAPSULE ORAL at 09:43

## 2022-03-04 RX ADMIN — POTASSIUM CHLORIDE 20 MEQ: 1500 TABLET, EXTENDED RELEASE ORAL at 09:42

## 2022-03-04 RX ADMIN — FUROSEMIDE 60 MG: 10 INJECTION, SOLUTION INTRAVENOUS at 10:02

## 2022-03-04 RX ADMIN — GUAIFENESIN 600 MG: 600 TABLET, EXTENDED RELEASE ORAL at 09:42

## 2022-03-04 RX ADMIN — DOCUSATE SODIUM 100 MG: 100 CAPSULE ORAL at 18:11

## 2022-03-04 RX ADMIN — TIOTROPIUM BROMIDE 18 MCG: 18 CAPSULE ORAL; RESPIRATORY (INHALATION) at 09:40

## 2022-03-04 RX ADMIN — INSULIN LISPRO 2 UNITS: 100 INJECTION, SOLUTION INTRAVENOUS; SUBCUTANEOUS at 12:14

## 2022-03-04 RX ADMIN — APIXABAN 5 MG: 5 TABLET, FILM COATED ORAL at 09:43

## 2022-03-04 RX ADMIN — METHYLPREDNISOLONE SODIUM SUCCINATE 40 MG: 40 INJECTION, POWDER, FOR SOLUTION INTRAMUSCULAR; INTRAVENOUS at 09:43

## 2022-03-04 RX ADMIN — LEVALBUTEROL HYDROCHLORIDE 1.25 MG: 1.25 SOLUTION, CONCENTRATE RESPIRATORY (INHALATION) at 21:38

## 2022-03-04 RX ADMIN — INSULIN LISPRO 3 UNITS: 100 INJECTION, SOLUTION INTRAVENOUS; SUBCUTANEOUS at 23:59

## 2022-03-04 RX ADMIN — METOPROLOL SUCCINATE 50 MG: 50 TABLET, EXTENDED RELEASE ORAL at 09:43

## 2022-03-04 RX ADMIN — LEVALBUTEROL HYDROCHLORIDE 1.25 MG: 1.25 SOLUTION, CONCENTRATE RESPIRATORY (INHALATION) at 14:07

## 2022-03-04 RX ADMIN — ATORVASTATIN CALCIUM 10 MG: 10 TABLET, FILM COATED ORAL at 09:43

## 2022-03-04 RX ADMIN — BUMETANIDE 2 MG: 0.25 INJECTION INTRAMUSCULAR; INTRAVENOUS at 18:11

## 2022-03-04 RX ADMIN — INSULIN LISPRO 3 UNITS: 100 INJECTION, SOLUTION INTRAVENOUS; SUBCUTANEOUS at 06:18

## 2022-03-04 RX ADMIN — Medication 3 MG: at 23:58

## 2022-03-04 RX ADMIN — ISODIUM CHLORIDE 3 ML: 0.03 SOLUTION RESPIRATORY (INHALATION) at 14:08

## 2022-03-04 RX ADMIN — LEVALBUTEROL HYDROCHLORIDE 1.25 MG: 1.25 SOLUTION, CONCENTRATE RESPIRATORY (INHALATION) at 09:08

## 2022-03-04 RX ADMIN — GUAIFENESIN 600 MG: 600 TABLET, EXTENDED RELEASE ORAL at 18:11

## 2022-03-04 RX ADMIN — MONTELUKAST 10 MG: 10 TABLET, FILM COATED ORAL at 23:58

## 2022-03-04 RX ADMIN — ISODIUM CHLORIDE 3 ML: 0.03 SOLUTION RESPIRATORY (INHALATION) at 09:08

## 2022-03-04 RX ADMIN — METHYLPREDNISOLONE SODIUM SUCCINATE 40 MG: 40 INJECTION, POWDER, FOR SOLUTION INTRAMUSCULAR; INTRAVENOUS at 23:58

## 2022-03-04 RX ADMIN — INSULIN GLARGINE 10 UNITS: 100 INJECTION, SOLUTION SUBCUTANEOUS at 23:58

## 2022-03-04 RX ADMIN — POTASSIUM CHLORIDE 20 MEQ: 1500 TABLET, EXTENDED RELEASE ORAL at 18:10

## 2022-03-04 RX ADMIN — INSULIN LISPRO 3 UNITS: 100 INJECTION, SOLUTION INTRAVENOUS; SUBCUTANEOUS at 16:30

## 2022-03-04 RX ADMIN — BUDESONIDE AND FORMOTEROL FUMARATE DIHYDRATE 2 PUFF: 160; 4.5 AEROSOL RESPIRATORY (INHALATION) at 09:50

## 2022-03-04 RX ADMIN — ISODIUM CHLORIDE 3 ML: 0.03 SOLUTION RESPIRATORY (INHALATION) at 21:38

## 2022-03-04 NOTE — ASSESSMENT & PLAN NOTE
Wt Readings from Last 3 Encounters:   03/04/22 (!) 144 kg (317 lb 6 4 oz)   02/22/22 (!) 143 kg (315 lb 1 6 oz)   02/12/22 (!) 145 kg (319 lb)     · Multiple recent admissions with CHF exacerbation  Presented with asthma exacerbation, worsening pedal edema bilaterally, suspect SOB 2/2 CHF with asthma exacerbation  · Patient on outpatient torsemide 40 mg b i d  which was increased recently  · Follows with Dr Gautam Degree outpatient  · Not on home oxygen at baseline   · BNP elevated 805, CXR unremarkable  · IV Lasix 60 mg BID, diuretics per Cardiology  · Patient still volume overloaded  LE edema noted - slightly improved per patient   With decreased breath sounds, rales and wheezes on exam  · Cardiology following:  · Continue IV diuresis, transition to oral diuretics when volume status improves  · Daily weight  · Intake and output ordered  · Salt restriction and fluid restriction

## 2022-03-04 NOTE — PLAN OF CARE
Problem: PAIN - ADULT  Goal: Verbalizes/displays adequate comfort level or baseline comfort level  Description: Interventions:  - Encourage patient to monitor pain and request assistance  - Assess pain using appropriate pain scale  - Administer analgesics based on type and severity of pain and evaluate response  - Implement non-pharmacological measures as appropriate and evaluate response  - Consider cultural and social influences on pain and pain management  - Notify physician/advanced practitioner if interventions unsuccessful or patient reports new pain  Outcome: Progressing     Problem: INFECTION - ADULT  Goal: Absence or prevention of progression during hospitalization  Description: INTERVENTIONS:  - Assess and monitor for signs and symptoms of infection  - Monitor lab/diagnostic results  - Monitor all insertion sites, i e  indwelling lines, tubes, and drains  - Monitor endotracheal if appropriate and nasal secretions for changes in amount and color  - [place] appropriate cooling/warming therapies per order  - Administer medications as ordered  - Instruct and encourage patient and family to use good hand hygiene technique  - Identify and instruct in appropriate isolation precautions for identified infection/condition  Outcome: Progressing     Problem: INFECTION - ADULT  Goal: Absence of fever/infection during neutropenic period  Description: INTERVENTIONS:  - Monitor WBC    Outcome: Progressing     Problem: DISCHARGE PLANNING  Goal: Discharge to home or other facility with appropriate resources  Description: INTERVENTIONS:  - Identify barriers to discharge w/patient and caregiver  - Arrange for needed discharge resources and transportation as appropriate  - Identify discharge learning needs (meds, wound care, etc )  - Arrange for interpretive services to assist at discharge as needed  - Refer to Case Management Department for coordinating discharge planning if the patient needs post-hospital services based on physician/advanced practitioner order or complex needs related to functional status, cognitive ability, or social support system  Outcome: Progressing     Problem: Knowledge Deficit  Goal: Patient/family/caregiver demonstrates understanding of disease process, treatment plan, medications, and discharge instructions  Description: Complete learning assessment and assess knowledge base    Interventions:  - Provide teaching at level of understanding  - Provide teaching via preferred learning methods  Outcome: Progressing

## 2022-03-04 NOTE — ASSESSMENT & PLAN NOTE
Lab Results   Component Value Date    HGBA1C 7 0 (H) 11/10/2021       Recent Labs     03/03/22  1046 03/03/22  1608 03/03/22  2117 03/04/22  0600   POCGLU 269* 311* 222* 236*       Blood Sugar Average: Last 72 hrs:  (P) 264 2   · Hold home oral meds while inpatient  · With suspected steroid-induced hyperglycemia  · Increase Lantus from 5 to 10 units  · Continue SSI coverage   · Diabetic diet  · Hypoglycemia protocol

## 2022-03-04 NOTE — PROGRESS NOTES
[address] [facility]  Progress Note - Grant Patel 1965, 49 y o  male MRN: [id number]  Unit/Bed#: CW2 218-01 Encounter: [id number]  Primary Care Provider: Bari Frank MD   Date and time admitted to hospital: 3/1/2022  6:05 AM    * Acute on chronic diastolic heart failure [facility])  Assessment & Plan  Wt Readings from Last 3 Encounters:   03/04/22 (!) 144 kg (317 lb 6 4 oz)   02/22/22 (!) 143 kg (315 lb 1 6 oz)   02/12/22 (!) 145 kg (319 lb)     · Multiple recent admissions with CHF exacerbation  Presented with asthma exacerbation, worsening pedal edema bilaterally, suspect SOB 2/2 CHF with asthma exacerbation  · Patient on outpatient torsemide 40 mg b i d  which was increased recently  · Follows with Dr Wilmer Roberson outpatient  · Not on home oxygen at baseline   · BNP elevated 805, CXR unremarkable  · IV Lasix 60 mg BID, diuretics per Cardiology  · Patient still volume overloaded  LE edema noted - slightly improved per patient  With decreased breath sounds, rales and wheezes on exam  · Cardiology following:  · Continue IV diuresis, transition to oral diuretics when volume status improves  · Daily weight  · Intake and output ordered  · Salt restriction and fluid restriction    Moderate asthma with acute exacerbation  Assessment & Plan  · Patient with history of moderate asthma presented to ER with complaint of increasing shortness of breath, hypoxia, diffuse wheezing  · Patient received Solu-Medrol 125 mg IV in ER with some improvement  · Patient currently on 2L NC O2, wean as tolerated  Patient still with decreased breath sounds and mild expiratory wheezing  · IV Solu-Medrol 40 mg BID - continue   · Continue home inhaler Symbicort, Spiriva, Singulair, Albuterol p r n    · Will need home O2 eval per RT prior to discharge  · Respiratory protocol on board     Type 2 diabetes mellitus with hyperglycemia [facility])  Assessment & Plan  Lab Results   Component Value Date    HGBA1C 7 0 (H) 11/10/2021       Recent Labs     03/03/22  1046 03/03/22  1608 03/03/22  2117 03/04/22  0600   POCGLU 269* 311* 222* 236*       Blood Sugar Average: Last 72 hrs:  (P) 264 2   · Hold home oral meds while inpatient  · With suspected steroid-induced hyperglycemia  · Increase Lantus from 5 to 10 units  · Continue SSI coverage   · Diabetic diet  · Hypoglycemia protocol    Stage 3a chronic kidney disease [facility])  Assessment & Plan  Lab Results   Component Value Date    EGFR 55 03/04/2022    EGFR 54 03/03/2022    EGFR 53 03/02/2022    CREATININE 1 43 (H) 03/04/2022    CREATININE 1 44 (H) 03/03/2022    CREATININE 1 47 (H) 03/02/2022   · Baseline Cr appears to be between 1 3-1 7  · Cr stable at baseline  · Caution with IV diuresis  · Monitor BMP, avoid nephrotoxins    Paroxysmal atrial fibrillation (HCC)  Assessment & Plan  · New diagnosis, rate controlled on metoprolol  · Anticoagulated on Eliquis  · Cardiology following     Essential hypertension  Assessment & Plan  · Blood pressure stable, continue home medication metoprolol  · Also on IV Lasix    VICKY (obstructive sleep apnea)  Assessment & Plan  · CPAP at bedtime    Morbid obesity (HCC)  Assessment & Plan  · Body mass index is 43 05 kg/m²  · Lifestyle modification        VTE Pharmacologic Prophylaxis: VTE Score: 6 High Risk (Score >/= 5) - Pharmacological DVT Prophylaxis Ordered: apixaban (Eliquis)  Sequential Compression Devices Ordered  Patient Centered Rounds: I performed bedside rounds with nursing staff today  d/w KERA Franks  Discussions with Specialists or Other Care Team Provider:     Education and Discussions with Family / Patient: Attempted to update  (wife) via phone  Left voicemail  ok to call wife and leave  with update per patient    0830 UPDATE: I received call back from the patient's wife and provided update over the phone  All questions answered to the best of my ability     Time Spent for Care: 30 minutes   More than 50% of total time spent on counseling and coordination of care as described above  Current Length of Stay: 2 day(s)  Current Patient Status: Inpatient   Certification Statement: The patient will continue to require additional inpatient hospital stay due to continued IV diuretics and steroids  Discharge Plan: Anticipate discharge in 48 hrs to home  Code Status: Level 1 - Full Code    Subjective:   Mr Meli Villareal reports feeling a little better than when he came in  He reports his LE swelling is a little improved  He reports continued wheeze    Objective:     Vitals:   Temp (24hrs), Av 6 °F (36 4 °C), Min:97 2 °F (36 2 °C), Max:97 8 °F (36 6 °C)    Temp:  [97 2 °F (36 2 °C)-97 8 °F (36 6 °C)] 97 5 °F (36 4 °C)  HR:  [63-79] 63  Resp:  [16-20] 17  BP: (110-129)/(57-68) 112/61  SpO2:  [93 %-96 %] 96 %  Body mass index is 43 05 kg/m²  Input and Output Summary (last 24 hours): Intake/Output Summary (Last 24 hours) at 3/4/2022 0826  Last data filed at 3/4/2022 0251  Gross per 24 hour   Intake 600 ml   Output 1800 ml   Net -1200 ml       Physical Exam:   Physical Exam  Vitals and nursing note reviewed  Constitutional:       General: He is not in acute distress  Appearance: He is obese  Comments: Patient seen sitting in bed comfortably resting, NAD   Cardiovascular:      Rate and Rhythm: Normal rate and regular rhythm  Pulmonary:      Effort: Pulmonary effort is normal  No respiratory distress  Breath sounds: Wheezing and rales present  Abdominal:      General: Bowel sounds are normal       Palpations: Abdomen is soft  Tenderness: There is no abdominal tenderness  Musculoskeletal:      Right lower leg: Edema present  Left lower leg: Edema present  Skin:     General: Skin is warm  Neurological:      Mental Status: He is alert and oriented to person, place, and time     Psychiatric:         Mood and Affect: Mood normal          Behavior: Behavior normal           Additional Data:     Labs:  Results from last 7 days   Lab Units 03/04/22  0557   WBC Thousand/uL 8 55   HEMOGLOBIN g/dL 10 9*   HEMATOCRIT % 32 8*   PLATELETS Thousands/uL 228   NEUTROS PCT % 85*   LYMPHS PCT % 8*   MONOS PCT % 6   EOS PCT % 0     Results from last 7 days   Lab Units 03/04/22  0557 03/02/22  0530 03/01/22  0636   SODIUM mmol/L 136   < > 141   POTASSIUM mmol/L 4 1   < > 3 2*   CHLORIDE mmol/L 99*   < > 100   CO2 mmol/L 31   < > 33*   BUN mg/dL 24   < > 15   CREATININE mg/dL 1 43*   < > 1 41*   ANION GAP mmol/L 6   < > 8   CALCIUM mg/dL 7 1*   < > 7 6*   ALBUMIN g/dL  --   --  2 7*   TOTAL BILIRUBIN mg/dL  --   --  0 80   ALK PHOS U/L  --   --  106   ALT U/L  --   --  13   AST U/L  --   --  12   GLUCOSE RANDOM mg/dL 237*   < > 118    < > = values in this interval not displayed  Results from last 7 days   Lab Units 03/04/22  0600 03/03/22  2117 03/03/22  1608 03/03/22  1046 03/03/22  0548 03/02/22  1543 03/02/22  1108 03/02/22  0553 03/01/22  2147 03/01/22  1620   POC GLUCOSE mg/dl 236* 222* 311* 269* 185* 355* 233* 185* 314* 332*         Results from last 7 days   Lab Units 03/02/22  0530 03/01/22  0636   PROCALCITONIN ng/ml 0 11 0 06       Lines/Drains:  Invasive Devices  Report    Peripheral Intravenous Line            Peripheral IV 03/04/22 Dorsal (posterior); Proximal;Right Forearm <1 day                      Imaging: Reviewed radiology reports from this admission including: chest xray    Recent Cultures (last 7 days):         Last 24 Hours Medication List:   Current Facility-Administered Medications   Medication Dose Route Frequency Provider Last Rate    acetaminophen  650 mg Oral Q6H PRN Fei Castillo MD      albuterol  2 puff Inhalation Q4H PRN Fei Castillo MD      aluminum-magnesium hydroxide-simethicone  30 mL Oral Q6H PRN Fei Castillo MD      apixaban  5 mg Oral BID Fei Castillo MD      atorvastatin  10 mg Oral Daily Fei Castillo MD      budesonide-formoterol  2 puff Inhalation BID Fei Castillo MD      docusate sodium  100 mg Oral BID Simon Bradshaw MD      furosemide  60 mg Intravenous BID (diuretic) Daniel Murillo PA-C      guaiFENesin  600 mg Oral BID Simon Bradshaw MD      insulin glargine  10 Units Subcutaneous HS Marysol Vidal PA-C      insulin lispro  1-6 Units Subcutaneous TID AC Devang Ingram MD      insulin lispro  1-6 Units Subcutaneous HS Devang Ingram MD      levalbuterol  1 25 mg Nebulization TID Simon Bradshaw MD      melatonin  3 mg Oral HS PRN Johanna Robins MD      methylPREDNISolone sodium succinate  40 mg Intravenous Q12H [address] Simon Bradshaw MD      metoprolol succinate  50 mg Oral Daily Devang Ingram MD      montelukast  10 mg Oral HS Devang Ingram MD      ondansetron  4 mg Intravenous Q6H PRN Simon Bradshaw MD      oxyCODONE-acetaminophen  1 tablet Oral Q4H PRN Simon Bradshaw MD      potassium chloride  20 mEq Oral BID Simon Bradshaw MD      sodium chloride  3 mL Nebulization TID Simon Bradshaw MD      tiotropium  18 mcg Inhalation Daily Simon Bradshaw MD          Today, Patient Was Seen By: Marysol Vidal PA-C    **Please Note: This note may have been constructed using a voice recognition system  **

## 2022-03-04 NOTE — UTILIZATION REVIEW
Continued Stay Review    Date: 03/04/2022                          Current Patient Class: Inpatient Current Level of Care: Med Surg    HPI:56 y o  male initially admitted on 03/01/2022    Assessment/Plan: Pt reports LE swelling improved  On exam: decreased breath sounds, rales and wheezes present  IV Solu-Medrol 40 mg BID - continue   Continue home inhaler Symbicort, Spiriva, Singulair, Albuterol p r n  Increase Lantus from 5 to 10 units  Continue SSI coverage   Monitor BMP, avoid nephrotoxins    Heart Failure Notes: No significant events overnight  Patient reports feeling about the same as yesterday and, maybe slightly better energy wise  Still requiring 2L NC  Cont to urinate with IV diuretics but not having significantly more urine output  Change to Bumex 2 mg IV BID  Vital Signs: /66   Pulse 64   Temp 97 9 °F (36 6 °C)   Resp 18   Ht 6' (1 829 m)   Wt (!) 144 kg (317 lb 6 4 oz)   SpO2 94%   BMI 43 05 kg/m²       Pertinent Labs/Diagnostic Results:   Results from last 7 days   Lab Units 03/01/22  1128   SARS-COV-2  Negative     Results from last 7 days   Lab Units 03/04/22  0557 03/03/22  0521 03/02/22  0530 03/01/22  0615 03/01/22  0615   WBC Thousand/uL 8 55 9 29 9 47   < > 12 31*   HEMOGLOBIN g/dL 10 9* 10 3* 10 8*  --  12 4   HEMATOCRIT % 32 8* 31 0* 33 7*  --  38 3   PLATELETS Thousands/uL 228 246 269   < > 379   NEUTROS ABS Thousands/µL 7 23 8 10* 7 76*   < > 9 22*    < > = values in this interval not displayed           Results from last 7 days   Lab Units 03/04/22  0557 03/03/22  0521 03/02/22  0530 03/01/22  0636   SODIUM mmol/L 136 137 135* 141   POTASSIUM mmol/L 4 1 4 0 3 5 3 2*   CHLORIDE mmol/L 99* 100 99* 100   CO2 mmol/L 31 31 30 33*   ANION GAP mmol/L 6 6 6 8   BUN mg/dL 24 27* 24 15   CREATININE mg/dL 1 43* 1 44* 1 47* 1 41*   EGFR ml/min/1 73sq m 55 54 53 56   CALCIUM mg/dL 7 1* 7 2* 7 2* 7 6*   MAGNESIUM mg/dL 2 1 2 0  --   --      Results from last 7 days   Lab Units 03/01/22  0636   AST U/L 12   ALT U/L 13   ALK PHOS U/L 106   TOTAL PROTEIN g/dL 7 4   ALBUMIN g/dL 2 7*   TOTAL BILIRUBIN mg/dL 0 80     Results from last 7 days   Lab Units 03/04/22  1114 03/04/22  0600 03/03/22  2117 03/03/22  1608 03/03/22  1046 03/03/22  0548 03/02/22  1543 03/02/22  1108 03/02/22  0553 03/01/22  2147 03/01/22  1620   POC GLUCOSE mg/dl 196* 236* 222* 311* 269* 185* 355* 233* 185* 314* 332*     Results from last 7 days   Lab Units 03/04/22  0557 03/03/22  0521 03/02/22  0530 03/01/22  0636   GLUCOSE RANDOM mg/dL 237* 195* 171* 118         Results from last 7 days   Lab Units 03/01/22  0640   PH OMERO  7 402*   PCO2 OMERO mm Hg 53 9*   PO2 OMERO mm Hg 67 5*   HCO3 OMERO mmol/L 32 8*   BASE EXC OMERO mmol/L 6 6   O2 CONTENT OMERO ml/dL 16 7   O2 HGB, VENOUS % 91 0*             Results from last 7 days   Lab Units 03/01/22  1128 03/01/22  0902 03/01/22  0637   HS TNI 0HR ng/L  --   --  18   HS TNI 2HR ng/L  --  16  --    HSTNI D2 ng/L  --  -2  --    HS TNI 4HR ng/L 16  --   --    HSTNI D4 ng/L -2  --   --                  Results from last 7 days   Lab Units 03/02/22  0530 03/01/22  0636   PROCALCITONIN ng/ml 0 11 0 06                 Results from last 7 days   Lab Units 03/01/22  0636   NT-PRO BNP pg/mL 805*       Results from last 7 days   Lab Units 03/01/22  1128   INFLUENZA A PCR  Negative   INFLUENZA B PCR  Negative   RSV PCR  Negative       Medications:   Scheduled Medications:  apixaban, 5 mg, Oral, BID  atorvastatin, 10 mg, Oral, Daily  budesonide-formoterol, 2 puff, Inhalation, BID  bumetanide, 2 mg, Intravenous, BID  docusate sodium, 100 mg, Oral, BID  guaiFENesin, 600 mg, Oral, BID  insulin glargine, 10 Units, Subcutaneous, HS  insulin lispro, 1-6 Units, Subcutaneous, TID AC  insulin lispro, 1-6 Units, Subcutaneous, HS  levalbuterol, 1 25 mg, Nebulization, TID  methylPREDNISolone sodium succinate, 40 mg, Intravenous, Q12H ANA  metoprolol succinate, 50 mg, Oral, Daily  montelukast, 10 mg, Oral, HS  potassium chloride, 20 mEq, Oral, BID  sodium chloride, 3 mL, Nebulization, TID  tiotropium, 18 mcg, Inhalation, Daily      Continuous IV Infusions: none     PRN Meds:  acetaminophen, 650 mg, Oral, Q6H PRN  albuterol, 2 puff, Inhalation, Q4H PRN  aluminum-magnesium hydroxide-simethicone, 30 mL, Oral, Q6H PRN  melatonin, 3 mg, Oral, HS PRN  ondansetron, 4 mg, Intravenous, Q6H PRN  oxyCODONE-acetaminophen, 1 tablet, Oral, Q4H PRN        Discharge Plan: [facility]    Network Utilization Review Department  ATTENTION: Please call with any questions or concerns to [phone] and carefully listen to the prompts so that you are directed to the right person  All voicemails are confidential   Kya Grand all requests for admission clinical reviews, approved or denied determinations and any other requests to dedicated fax number below belonging to the campus where the patient is receiving treatment   List of dedicated fax numbers for the Facilities:  [address] DENIALS (Administrative/Medical Necessity) [phone]   [address] (Maternity/NICU/Pediatrics) [phone]   [address]  [address] [facility] [place] [id number] [place] [address] [phone] [facility] [phone]

## 2022-03-04 NOTE — PROGRESS NOTES
Cardiology Progress Note - Fernando Sher 49 y o  male MRN: [id number]    Unit/Bed#: CW2 218-01 Encounter: [id number]      Assessment & Plan:  Assessment and Plan:    Acute on chronic diastolic heart failure (HCC)  -TTE 11/12/2021 LVEF 55%, LVIDd 6 0 cm, grade 1 diastolic dysfunction, normal RV, trace TR  -home diuretic regimen torsemide 40 mg p o  B i d   -heart failure regiment Toprol XL 50 mg daily  -current diuretic regiment Lasix 60 mg IV b i d   -appears to be a mixed picture of a heart failure exacerbation with a asthma exacerbation    Moderate asthma with acute exacerbation  -started on IV Solu-Medrol  -PFT's 10/22/2021 FVC 61%, FEV1 39%, FEV1/FVC ratio 49%, TLC 98%, residual volume 183%, DLCO 65%  -does also appear to have a component of asthma exacerbation contributing to the shortness of breath and hypoxia    Parox[facility] ([facility] )  -currently anticoagulated on Eliquis 5 mg b i d   - controlled with Toprol XL 50 mg daily    Chronic kidney disease stage III  -baseline creatinine appears to be around 1 4-1 6  -appears to be at baseline currently    Essential hypertension  -currently on Toprol XL 50 mg daily    Hyperlipidemia  -currently on Lipitor 10 mg daily    Type 2 diabetes mellitus with hyperglycemia ([facility] )  -hemoglobin A1c 7 0 on 11/10/2021    Morbid obesity ([facility] )  -history of gastric bypass around 2007    VICKY (obstructive sleep apnea)  -has CPAP machine at home, but reports it is currently recalled so has not been using it for several months now     Plan:  -change Lasix to Bumex 2 mg IV b i d  To improve diuresis  -recommend continued treatment of asthma exacerbation as well       Subjective:   No significant events overnight  Patient reports feeling about the same as yesterday and, maybe slightly better energy wise  No significant change in his breathing, still requiring 2 L nasal cannula  Continues to urinate with IV diuretics, but not having significantly more urine output    Denies chest pain, abdominal pain, nausea, vomiting, fever, chills, headache, dizziness or palpitations  Objective:     Vitals: Blood pressure 112/61, pulse 63, temperature 97 5 °F (36 4 °C), resp  rate 17, height 6' (1 829 m), weight (!) 144 kg (317 lb 6 4 oz), SpO2 96 %  , Body mass index is 43 05 kg/m² ,   Orthostatic Blood Pressures      Most Recent Value   Blood Pressure 112/61 filed at 03/04/2022 0602   Patient Position - Orthostatic VS Lying filed at 03/02/2022 2225            Intake/Output Summary (Last 24 hours) at 3/4/2022 0982  Last data filed at 3/4/2022 0251  Gross per 24 hour   Intake 600 ml   Output 1800 ml   Net -1200 ml           Physical Exam:    GEN: Daylenteresa Heclaudiay appears well, alert and oriented x 3, pleasant and cooperative   HEENT: anicteric, mucous membranes moist  NECK:  No significant JVD   HEART:  Rate and regular rhythm, with occasional ectopic beats, no significant audible murmurs  LUNGS:  Diffuse bilateral expiratory wheezes, no significant crackles  ABDOMEN: normal bowel sounds, soft, no tenderness, no distention  EXTREMITIES: peripheral pulses normal; + lower extremity nonpitting edema  NEURO: no focal findings   SKIN: normal without suspicious lesions on exposed skin      Current Facility-Administered Medications:     acetaminophen (TYLENOL) tablet 650 mg, 650 mg, Oral, Q6H PRN, Chava Moran MD, 650 mg at 03/01/22 2017    albuterol (PROVENTIL HFA,VENTOLIN HFA) inhaler 2 puff, 2 puff, Inhalation, Q4H PRN, Chava Moran MD    aluminum-magnesium hydroxide-simethicone (MYLANTA) oral suspension 30 mL, 30 mL, Oral, Q6H PRN, Chava Moran MD    apixaban (ELIQUIS) tablet 5 mg, 5 mg, Oral, BID, Devang Ingram MD, 5 mg at 03/03/22 2136    atorvastatin (LIPITOR) tablet 10 mg, 10 mg, Oral, Daily, Devang Ingram MD, 10 mg at 03/03/22 0838    budesonide-formoterol (SYMBICORT) 160-4 5 mcg/act inhaler 2 puff, 2 puff, Inhalation, BID, Chava Moran MD, 2 puff at 03/03/22 1700    docusate sodium (COLACE) capsule 100 mg, 100 mg, Oral, BID, Hugh Estevez MD, 100 mg at 03/03/22 1710    furosemide (LASIX) injection 60 mg, 60 mg, Intravenous, BID (diuretic), Moni Rodríguez PA-C, 60 mg at 03/03/22 1657    guaiFENesin (MUCINEX) 12 hr tablet 600 mg, 600 mg, Oral, BID, Devang Ingram MD, 600 mg at 03/03/22 1710    insulin glargine (LANTUS) subcutaneous injection 10 Units 0 1 mL, 10 Units, Subcutaneous, HS, Yessi Allen PA-C    insulin lispro (HumaLOG) 100 units/mL subcutaneous injection 1-6 Units, 1-6 Units, Subcutaneous, TID AC, 3 Units at 03/04/22 0618 **AND** Fingerstick Glucose (POCT), , , TID AC, Devang Ingram MD    insulin lispro (HumaLOG) 100 units/mL subcutaneous injection 1-6 Units, 1-6 Units, Subcutaneous, HS, Hugh Estevez MD, 2 Units at 03/03/22 2138    levalbuterol (XOPENEX) inhalation solution 1 25 mg, 1 25 mg, Nebulization, TID, Hugh Estevez MD, 1 25 mg at 03/04/22 0908    melatonin tablet 3 mg, 3 mg, Oral, HS PRN, Zoë Bedolla MD    methylPREDNISolone sodium succinate (Solu-MEDROL) injection 40 mg, 40 mg, Intravenous, Q12H [facility], Hugh Estevez MD, 40 mg at 03/03/22 2137    metoprolol succinate (TOPROL-XL) 24 hr tablet 50 mg, 50 mg, Oral, Daily, Devang Ingram MD, 50 mg at 03/03/22 0838    montelukast (SINGULAIR) tablet 10 mg, 10 mg, Oral, HS, Devang Ingram MD, 10 mg at 03/03/22 2136    ondansetron (ZOFRAN) injection 4 mg, 4 mg, Intravenous, Q6H PRN, Hugh Estevez MD    oxyCODONE-acetaminophen (PERCOCET) 5-325 mg per tablet 1 tablet, 1 tablet, Oral, Q4H PRN, Hugh Estevez MD    potassium chloride (K-DUR,KLOR-CON) CR tablet 20 mEq, 20 mEq, Oral, BID, Devang Ingram MD, 20 mEq at 03/03/22 1710    sodium chloride 0 9 % inhalation solution 3 mL, 3 mL, Nebulization, TID, Devang Ingram MD, 3 mL at 03/04/22 0908    tiotropium (SPIRIVA) capsule for inhaler 18 mcg, 18 mcg, Inhalation, Daily, Hugh Estevez MD, 18 mcg at 03/03/22 0844    Labs & Results:    Lab Results   Component Value Date    TROPONINI <0 02 09/22/2021    TROPONINI <0 02 07/19/2021    TROPONINI <0 02 07/19/2021       Lab Results   Component Value Date    CALCIUM 7 1 (L) 03/04/2022    K 4 1 03/04/2022    CO2 31 03/04/2022    CL 99 (L) 03/04/2022    BUN 24 03/04/2022    CREATININE 1 43 (H) 03/04/2022       Lab Results   Component Value Date    WBC 8 55 03/04/2022    HGB 10 9 (L) 03/04/2022    HCT 32 8 (L) 03/04/2022    MCV 89 03/04/2022     03/04/2022           No results found for: CHOL  Lab Results   Component Value Date    HDL 66 10/01/2021    HDL 46 01/28/2020     Lab Results   Component Value Date    LDLCALC 73 10/01/2021    LDLCALC 144 (H) 01/28/2020     Lab Results   Component Value Date    TRIG 90 10/01/2021    TRIG 126 01/28/2020       Lab Results   Component Value Date    ALT 13 03/01/2022    AST 12 03/01/2022         EKG personally reviewed by )Nitish Golden MD  No acute changes   TELE: No significant arrhythmias seen on telemetry review

## 2022-03-04 NOTE — UTILIZATION REVIEW
Inpatient Admission Authorization Request   NOTIFICATION OF INPATIENT ADMISSION/INPATIENT AUTHORIZATION REQUEST   SERVICING FACILITY:   [facility]  Address: [address] [place] [address], [address] [place] [address]  Tax ID: [id number]  NPI: [id number]  Place of Service: Inpatient [address] [facility] Code: 24     ATTENDING PROVIDER:  Attending Name and NPI#: Nixon Zavaleta, [address] [[id number]]  Address: [address] [place] [address], [address] [place] [address]  Phone: [phone]     UTILIZATION REVIEW CONTACT:  Butch Galeano Utilization   Network Utilization Review Department  Phone: [phone]  Fax: [phone]  Email: [email or web address]     PHYSICIAN ADVISORY SERVICES:  FOR BIJZ-BC-QMPZ REVIEW - MEDICAL NECESSITY DENIAL  Phone: [phone]  Fax: [phone]  Email: [email or web address]     TYPE OF REQUEST:  Inpatient Status     ADMISSION INFORMATION:  ADMISSION DATE/TIME: 3/2/22  1:06 PM  PATIENT DIAGNOSIS CODE/DESCRIPTION:  SOB (shortness of breath) [R06 02]  Acute exacerbation of CHF (congestive heart failure) ([facility]) [I50 9]  Asthma exacerbation [J45 901]  Acute respiratory failure with hypoxia ([address] ) [J96 01]  DISCHARGE DATE/TIME: No discharge date for patient encounter  IMPORTANT INFORMATION:  Please contact the Butch Galeano directly with any questions or concerns regarding this request  Department voicemails are confidential     Send requests for admission clinical reviews, concurrent reviews, approvals, and administrative denials due to lack of clinical to fax [phone]  Initial Clinical Review  Observation on 03/01 @ 0840 changed to Inpatient on 03/02 @ 1306   Pt requiring continued stay for continued dyspnea on exertion, on IV diuresis, IV Solu Medrol    Admission: Date/Time/Statement:   Admission Orders (From admission, onward)     Ordered        03/02/22 1306  Inpatient Admission  Once            03/01/22 0840  Place in Observation  Once Orders Placed This Encounter   Procedures    Inpatient Admission     Standing Status:   Standing     Number of Occurrences:   1     Order Specific Question:   Level of Care     Answer:   Med Surg [16]     Order Specific Question:   Estimated length of stay     Answer:   More than 2 Midnights     Order Specific Question:   Certification     Answer:   I certify that inpatient services are medically necessary for this patient for a duration of greater than two midnights  See H&P and MD Progress Notes for additional information about the patient's course of treatment  ED Arrival Information     Expected Arrival Acuity    - 3/1/2022 06:05 Emergent         Means of arrival Escorted by Service Admission type    [facility]         Arrival complaint    Chest Pain        Chief Complaint   Patient presents with    Shortness of Breath     felt SOB used albuterol didn't seem to work, hx of CHF and new dx of a-fib, reports feeling dizzy when standing, found in the 80s per EMS, doesn't wear 02 at home        Initial Presentation: 49 y o  male presented to the ED via EMS with worsening SOB x 1 week and increasing pedal edema  PMH of CHF on torsemide, moderate asthma, AFib on Eliquis, hypertension  Pt's cardiologist increased his Torsemide d/t worsening sob with no improvement  In ED, found to have diffuse wheezing, lower extremity edema  Troponin negative x2  ProBNP mildly elevated  Started on IV Lasix and IV Solu-Medrol  Admit as observation level of care for CHF and asthma exacerbation: cont IV Lasix, daily weight, I/O, salt and fluid restriction  Cont home symbicort, spiriva  Singulair, Albuterol prn  Start solu medrol 40 mg bid, taper as able  03/02  IM Notes: Pt still reports dyspnea with exertion, transient episodes of chest pain overnight that have resolved  Still with lower extremity swelling  Weaned down to RA with 2L NC use with exertion   Cardiology consulted, cont solu medrol, Daily weight, intake and output, Salt restriction and fluid restriction, Continue home inhaler Symbicort, Spiriva, Continue Singulair, Albuterol p r n , cont IV Lasix    Heart Failure Consult; Chronic HFpEF; LVEF 55%; LVIDd 6 0 cm; NYHA III; ACC/AHA Stage C   continue diuresis with current regiment   -continue to treat Asthma exacerbation    Given multiple admits, discussed Cardiomems     ED Triage Vitals [03/01/22 0609]   Temperature Pulse Respirations Blood Pressure SpO2   97 5 °F (36 4 °C) (!) 115 (!) 25 163/88 99 %      Temp Source Heart Rate Source Patient Position - Orthostatic VS BP Location FiO2 (%)   Oral Monitor Sitting Right arm --      Pain Score       No Pain          Wt Readings from Last 1 Encounters:   03/04/22 (!) 144 kg (317 lb 6 4 oz)     Additional Vital Signs:   Date/Time Temp Pulse Resp BP MAP (mmHg) SpO2 O2 Flow Rate (L/min) O2 Device Patient Position - Orthostatic VS   03/02/22 15:43:37 98 3 °F (36 8 °C) 70 18 120/51 74 94 % -- -- --   03/02/22 1300 -- -- -- -- -- 97 % 2 L/min Nasal cannula --   03/02/22 1051 -- -- -- -- -- -- 2 L/min Nasal cannula --   03/02/22 0841 -- -- -- -- -- 95 % -- -- --   03/02/22 07:57:43 -- 91 -- 114/61 79 95 % -- -- --   03/02/22 0100 -- -- -- -- -- -- -- None (Room air) --   03/01/22 23:40:03 97 7 °F (36 5 °C) 72 21 129/75 93 94 % -- -- --   03/01/22 23:20:25 -- 85 -- 128/74 92 93 % -- -- --   03/01/22 1926 -- -- -- -- -- 93 % -- -- --   03/01/22 16:21:19 97 6 °F (36 4 °C) 88 18 129/74 92 92 % -- -- --   03/01/22 1330 -- -- -- -- -- -- 2 L/min Nasal cannula --   03/01/22 13:05:18 97 4 °F (36 3 °C) Abnormal  98 -- 131/76 94 91 % -- -- --   03/01/22 13:04:05 97 4 °F (36 3 °C) Abnormal  97 18 131/76 94 91 % -- -- --   03/01/22 0843 -- 103 18 141/67 -- 91 % -- Nasal cannula --   03/01/22 0611 -- -- -- -- -- -- -- Non-rebreather mask --       Pertinent Labs/Diagnostic Test Results:   XR chest 1 view portable   Final Result by Anjel Rainey Kristine Dumont MD (03/01 1208)      No acute cardiopulmonary disease  Please refer to prior chest CT report for follow-up recommendations                 Workstation performed: [id number]           03/01 EKG result: NSR    Results from last 7 days   Lab Units 03/01/22  1128   SARS-COV-2  Negative     Results from last 7 days   Lab Units 03/03/22  0521 03/02/22  0530 03/01/22  0615   WBC Thousand/uL 9 29 9 47 12 31*   HEMOGLOBIN g/dL 10 3* 10 8* 12 4   HEMATOCRIT % 31 0* 33 7* 38 3   PLATELETS Thousands/uL 246 269 379   NEUTROS ABS Thousands/µL 8 10* 7 76* 9 22*         Results from last 7 days   Lab Units 03/03/22  0521 03/02/22  0530 03/01/22  0636   SODIUM mmol/L 137 135* 141   POTASSIUM mmol/L 4 0 3 5 3 2*   CHLORIDE mmol/L 100 99* 100   CO2 mmol/L 31 30 33*   ANION GAP mmol/L 6 6 8   BUN mg/dL 27* 24 15   CREATININE mg/dL 1 44* 1 47* 1 41*   EGFR ml/min/1 73sq m 54 53 56   CALCIUM mg/dL 7 2* 7 2* 7 6*   MAGNESIUM mg/dL 2 0  --   --      Results from last 7 days   Lab Units 03/01/22  0636   AST U/L 12   ALT U/L 13   ALK PHOS U/L 106   TOTAL PROTEIN g/dL 7 4   ALBUMIN g/dL 2 7*   TOTAL BILIRUBIN mg/dL 0 80     Results from last 7 days   Lab Units 03/03/22  2117 03/03/22  1608 03/03/22  1046 03/03/22  0548 03/02/22  1543 03/02/22  1108 03/02/22  0553 03/01/22  2147 03/01/22  1620   POC GLUCOSE mg/dl 222* 311* 269* 185* 355* 233* 185* 314* 332*     Results from last 7 days   Lab Units 03/03/22  0521 03/02/22  0530 03/01/22  0636   GLUCOSE RANDOM mg/dL 195* 171* 118       Results from last 7 days   Lab Units 03/01/22  0640   PH OMERO  7 402*   PCO2 OMERO mm Hg 53 9*   PO2 OMERO mm Hg 67 5*   HCO3 OMERO mmol/L 32 8*   BASE EXC OMERO mmol/L 6 6   O2 CONTENT OMERO ml/dL 16 7   O2 HGB, VENOUS % 91 0*             Results from last 7 days   Lab Units 03/01/22  1128 03/01/22  0902 03/01/22  0637   HS TNI 0HR ng/L  --   --  18   HS TNI 2HR ng/L  --  16  --    HSTNI D2 ng/L  --  -2  --    HS TNI 4HR ng/L 16  --   --    HSTNI D4 ng/L -2  --   -- Results from last 7 days   Lab Units 03/02/22  0530 03/01/22  0636   PROCALCITONIN ng/ml 0 11 0 06                 Results from last 7 days   Lab Units 03/01/22  0636   NT-PRO BNP pg/mL 805*       Results from last 7 days   Lab Units 03/01/22  1128   INFLUENZA A PCR  Negative   INFLUENZA B PCR  Negative   RSV PCR  Negative     ED Treatment:   Medication Administration from 03/01/2022 0605 to 03/01/2022 1247       Date/Time Order Dose Route Action     03/01/2022 0641 albuterol inhalation solution 10 mg 10 mg Nebulization Given     03/01/2022 0641 ipratropium (ATROVENT) 0 02 % inhalation solution 1 mg 1 mg Nebulization Given     03/01/2022 0641 sodium chloride 0 9 % inhalation solution 12 mL 12 mL Nebulization Given     03/01/2022 0751 furosemide (LASIX) injection 40 mg 40 mg Intravenous Given     03/01/2022 0749 potassium chloride (K-DUR,KLOR-CON) CR tablet 40 mEq 40 mEq Oral Given     03/01/2022 0750 methylPREDNISolone sodium succinate (Solu-MEDROL) injection 125 mg 125 mg Intravenous Given        Past Medical History:   Diagnosis Date    Acute gastritis without hemorrhage     last assessed 3/24/17    Asthma     [facility] mellitus ([facility]     Gastric bypass status for obesity     Hyperlipidemia     Hypertension     Impaired fasting glucose     last assessed 5/10/17    Obesity     Viral gastroenteritis     last assessed 11/4/16     Present on Admission:   Acute on chronic diastolic heart failure ([facility] )   Essential hypertension   [facility]   Moderate asthma with acute exacerbation   VICKY (obstructive sleep apnea)   Type 2 diabetes mellitus with hyperglycemia ([facility]      Admitting Diagnosis: SOB (shortness of breath) [R06 02]  Acute exacerbation of CHF (congestive heart failure) ([facility]) [I50 9]  Asthma exacerbation [J45 901]  Acute respiratory failure with hypoxia ([facility] ) [J96 01]  Age/Sex: 49 y o  male  Admission Orders:  SCD  Daily weights  I/O  Diet: Sodium 2 gm; fluid restriction 1500 ml  Scheduled Medications:  apixaban, 5 mg, Oral, BID  atorvastatin, 10 mg, Oral, Daily  budesonide-formoterol, 2 puff, Inhalation, BID  docusate sodium, 100 mg, Oral, BID  furosemide, 60 mg, Intravenous, BID (diuretic)  guaiFENesin, 600 mg, Oral, BID  insulin glargine, 5 Units, Subcutaneous, HS  insulin lispro, 1-6 Units, Subcutaneous, TID AC  insulin lispro, 1-6 Units, Subcutaneous, HS  levalbuterol, 1 25 mg, Nebulization, TID  methylPREDNISolone sodium succinate, 40 mg, Intravenous, Q12H ANA  metoprolol succinate, 50 mg, Oral, Daily  montelukast, 10 mg, Oral, HS  potassium chloride, 20 mEq, Oral, BID  sodium chloride, 3 mL, Nebulization, TID  tiotropium, 18 mcg, Inhalation, Daily      Continuous IV Infusions: none     PRN Meds:  acetaminophen, 650 mg, Oral, Q6H PRN 03/01 x 1  albuterol, 2 puff, Inhalation, Q4H PRN  aluminum-magnesium hydroxide-simethicone, 30 mL, Oral, Q6H PRN  ondansetron, 4 mg, Intravenous, Q6H PRN  oxyCODONE-acetaminophen, 1 tablet, Oral, Q4H PRN        IP CONSULT TO CARDIOLOGY    Network Utilization Review Department  ATTENTION: Please call with any questions or concerns to [phone] and carefully listen to the prompts so that you are directed to the right person  All voicemails are confidential   Morales Miller all requests for admission clinical reviews, approved or denied determinations and any other requests to dedicated fax number below belonging to the campus where the patient is receiving treatment   List of dedicated fax numbers for the Facilities:  [address] DENIALS (Administrative/Medical Necessity) [phone]   [address] [place] [address] (Maternity/NICU/Pediatrics) [phone]   [address] [place] [address] [place] [address] [facility] [address] [facility] [address] [place] [address] Berkley [address] [phone]

## 2022-03-04 NOTE — ASSESSMENT & PLAN NOTE
· Patient with history of moderate asthma presented to ER with complaint of increasing shortness of breath, hypoxia, diffuse wheezing  · Patient received Solu-Medrol 125 mg IV in ER with some improvement  · Patient currently on 2L NC O2, wean as tolerated  Patient still with decreased breath sounds and mild expiratory wheezing  · IV Solu-Medrol 40 mg BID - continue   · Continue home inhaler Symbicort, Spiriva, Singulair, Albuterol p r n    · Will need home O2 eval per RT prior to discharge  · Respiratory protocol on board

## 2022-03-04 NOTE — ASSESSMENT & PLAN NOTE
Lab Results   Component Value Date    EGFR 55 03/04/2022    EGFR 54 03/03/2022    EGFR 53 03/02/2022    CREATININE 1 43 (H) 03/04/2022    CREATININE 1 44 (H) 03/03/2022    CREATININE 1 47 (H) 03/02/2022   · Baseline Cr appears to be between 1 3-1 7  · Cr stable at baseline  · Caution with IV diuresis  · Monitor BMP, avoid nephrotoxins

## 2022-03-05 LAB
ANION GAP SERPL CALCULATED.3IONS-SCNC: 4 MMOL/L (ref 4–13)
BUN SERPL-MCNC: 26 MG/DL (ref 5–25)
CALCIUM SERPL-MCNC: 7.4 MG/DL (ref 8.3–10.1)
CHLORIDE SERPL-SCNC: 101 MMOL/L (ref 100–108)
CO2 SERPL-SCNC: 31 MMOL/L (ref 21–32)
CREAT SERPL-MCNC: 1.29 MG/DL (ref 0.6–1.3)
ERYTHROCYTE [DISTWIDTH] IN BLOOD BY AUTOMATED COUNT: 15.4 % (ref 11.6–15.1)
GFR SERPL CREATININE-BSD FRML MDRD: 62 ML/MIN/1.73SQ M
GLUCOSE SERPL-MCNC: 212 MG/DL (ref 65–140)
GLUCOSE SERPL-MCNC: 213 MG/DL (ref 65–140)
GLUCOSE SERPL-MCNC: 217 MG/DL (ref 65–140)
GLUCOSE SERPL-MCNC: 272 MG/DL (ref 65–140)
GLUCOSE SERPL-MCNC: 390 MG/DL (ref 65–140)
HCT VFR BLD AUTO: 32 % (ref 36.5–49.3)
HGB BLD-MCNC: 10.2 G/DL (ref 12–17)
MCH RBC QN AUTO: 30 PG (ref 26.8–34.3)
MCHC RBC AUTO-ENTMCNC: 31.9 G/DL (ref 31.4–37.4)
MCV RBC AUTO: 94 FL (ref 82–98)
PLATELET # BLD AUTO: 183 THOUSANDS/UL (ref 149–390)
PMV BLD AUTO: 10.9 FL (ref 8.9–12.7)
POTASSIUM SERPL-SCNC: 4.2 MMOL/L (ref 3.5–5.3)
RBC # BLD AUTO: 3.4 MILLION/UL (ref 3.88–5.62)
SODIUM SERPL-SCNC: 136 MMOL/L (ref 136–145)
WBC # BLD AUTO: 8.1 THOUSAND/UL (ref 4.31–10.16)

## 2022-03-05 PROCEDURE — 85027 COMPLETE CBC AUTOMATED: CPT | Performed by: PHYSICIAN ASSISTANT

## 2022-03-05 PROCEDURE — 99232 SBSQ HOSP IP/OBS MODERATE 35: CPT | Performed by: PHYSICIAN ASSISTANT

## 2022-03-05 PROCEDURE — 99232 SBSQ HOSP IP/OBS MODERATE 35: CPT | Performed by: INTERNAL MEDICINE

## 2022-03-05 PROCEDURE — 80048 BASIC METABOLIC PNL TOTAL CA: CPT | Performed by: PHYSICIAN ASSISTANT

## 2022-03-05 PROCEDURE — 82948 REAGENT STRIP/BLOOD GLUCOSE: CPT

## 2022-03-05 RX ORDER — ALBUTEROL SULFATE 90 UG/1
2 AEROSOL, METERED RESPIRATORY (INHALATION) 4 TIMES DAILY
Status: DISCONTINUED | OUTPATIENT
Start: 2022-03-05 | End: 2022-03-10 | Stop reason: HOSPADM

## 2022-03-05 RX ORDER — METOPROLOL SUCCINATE 50 MG/1
50 TABLET, EXTENDED RELEASE ORAL DAILY
Status: DISCONTINUED | OUTPATIENT
Start: 2022-03-05 | End: 2022-03-10 | Stop reason: HOSPADM

## 2022-03-05 RX ORDER — METHYLPREDNISOLONE SODIUM SUCCINATE 40 MG/ML
20 INJECTION, POWDER, LYOPHILIZED, FOR SOLUTION INTRAMUSCULAR; INTRAVENOUS EVERY 12 HOURS SCHEDULED
Status: DISCONTINUED | OUTPATIENT
Start: 2022-03-05 | End: 2022-03-06

## 2022-03-05 RX ADMIN — Medication 3 MG: at 22:55

## 2022-03-05 RX ADMIN — BUDESONIDE AND FORMOTEROL FUMARATE DIHYDRATE 2 PUFF: 160; 4.5 AEROSOL RESPIRATORY (INHALATION) at 10:38

## 2022-03-05 RX ADMIN — BUMETANIDE 2 MG: 0.25 INJECTION INTRAMUSCULAR; INTRAVENOUS at 18:05

## 2022-03-05 RX ADMIN — BUDESONIDE AND FORMOTEROL FUMARATE DIHYDRATE 2 PUFF: 160; 4.5 AEROSOL RESPIRATORY (INHALATION) at 18:05

## 2022-03-05 RX ADMIN — APIXABAN 5 MG: 5 TABLET, FILM COATED ORAL at 10:38

## 2022-03-05 RX ADMIN — INSULIN LISPRO 4 UNITS: 100 INJECTION, SOLUTION INTRAVENOUS; SUBCUTANEOUS at 12:55

## 2022-03-05 RX ADMIN — APIXABAN 5 MG: 5 TABLET, FILM COATED ORAL at 22:55

## 2022-03-05 RX ADMIN — DOCUSATE SODIUM 100 MG: 100 CAPSULE ORAL at 18:03

## 2022-03-05 RX ADMIN — APIXABAN 5 MG: 5 TABLET, FILM COATED ORAL at 00:00

## 2022-03-05 RX ADMIN — INSULIN LISPRO 2 UNITS: 100 INJECTION, SOLUTION INTRAVENOUS; SUBCUTANEOUS at 07:06

## 2022-03-05 RX ADMIN — METHYLPREDNISOLONE SODIUM SUCCINATE 20 MG: 40 INJECTION, POWDER, FOR SOLUTION INTRAMUSCULAR; INTRAVENOUS at 22:55

## 2022-03-05 RX ADMIN — INSULIN GLARGINE 10 UNITS: 100 INJECTION, SOLUTION SUBCUTANEOUS at 22:54

## 2022-03-05 RX ADMIN — DOCUSATE SODIUM 100 MG: 100 CAPSULE ORAL at 10:38

## 2022-03-05 RX ADMIN — ALBUTEROL SULFATE 2 PUFF: 90 AEROSOL, METERED RESPIRATORY (INHALATION) at 22:52

## 2022-03-05 RX ADMIN — ALBUTEROL SULFATE 2 PUFF: 90 AEROSOL, METERED RESPIRATORY (INHALATION) at 12:54

## 2022-03-05 RX ADMIN — GUAIFENESIN 600 MG: 600 TABLET, EXTENDED RELEASE ORAL at 10:38

## 2022-03-05 RX ADMIN — TIOTROPIUM BROMIDE 18 MCG: 18 CAPSULE ORAL; RESPIRATORY (INHALATION) at 10:36

## 2022-03-05 RX ADMIN — ALBUTEROL SULFATE 2 PUFF: 90 AEROSOL, METERED RESPIRATORY (INHALATION) at 10:36

## 2022-03-05 RX ADMIN — ATORVASTATIN CALCIUM 10 MG: 10 TABLET, FILM COATED ORAL at 10:38

## 2022-03-05 RX ADMIN — BUMETANIDE 2 MG: 0.25 INJECTION INTRAMUSCULAR; INTRAVENOUS at 12:54

## 2022-03-05 RX ADMIN — METHYLPREDNISOLONE SODIUM SUCCINATE 40 MG: 40 INJECTION, POWDER, FOR SOLUTION INTRAMUSCULAR; INTRAVENOUS at 10:38

## 2022-03-05 RX ADMIN — INSULIN LISPRO 6 UNITS: 100 INJECTION, SOLUTION INTRAVENOUS; SUBCUTANEOUS at 18:12

## 2022-03-05 RX ADMIN — ALBUTEROL SULFATE 2 PUFF: 90 AEROSOL, METERED RESPIRATORY (INHALATION) at 18:04

## 2022-03-05 RX ADMIN — MONTELUKAST 10 MG: 10 TABLET, FILM COATED ORAL at 22:55

## 2022-03-05 RX ADMIN — GUAIFENESIN 600 MG: 600 TABLET, EXTENDED RELEASE ORAL at 18:03

## 2022-03-05 RX ADMIN — METOPROLOL SUCCINATE 50 MG: 50 TABLET, EXTENDED RELEASE ORAL at 10:38

## 2022-03-05 RX ADMIN — POTASSIUM CHLORIDE 20 MEQ: 1500 TABLET, EXTENDED RELEASE ORAL at 18:03

## 2022-03-05 RX ADMIN — POTASSIUM CHLORIDE 20 MEQ: 1500 TABLET, EXTENDED RELEASE ORAL at 10:38

## 2022-03-05 RX ADMIN — INSULIN LISPRO 2 UNITS: 100 INJECTION, SOLUTION INTRAVENOUS; SUBCUTANEOUS at 22:52

## 2022-03-05 NOTE — PROGRESS NOTES
[address] [facility]  Progress Note - Yonas Rivero 1965, 49 y o  male MRN: [id number]  Unit/Bed#: CW2 218-01 Encounter: [id number]  Primary Care Provider: Jann Jean-Baptiste MD   Date and time admitted to hospital: 3/1/2022  6:05 AM    * Acute on chronic diastolic heart failure [facility])  Assessment & Plan  Wt Readings from Last 3 Encounters:   03/05/22 (!) 143 kg (315 lb 11 2 oz)   02/22/22 (!) 143 kg (315 lb 1 6 oz)   02/12/22 (!) 145 kg (319 lb)     · Multiple recent admissions with CHF exacerbation  · Patient on outpatient torsemide 40 mg b i d  which was increased recently  · Follows with Dr Eddie Hoyt outpatient  · Not on home oxygen at baseline   · BNP elevated 805, CXR unremarkable  · Diuresis change from Lasix to Bumex 2 mg BID on 3/4 with clinical improvement  · Cardiology input appreciated  · Monitor I/O, daily weights    Moderate asthma with acute exacerbation  Assessment & Plan  · Patient with history of moderate asthma presented to ER with complaint of increasing shortness of breath, hypoxia, diffuse wheezing  · Decrease Solumedrol to 20 mg IV BID  · Continue home inhaler Symbicort, Spiriva, Singulair, Albuterol p r n  · Respiratory protocol on board     Paroxysmal atrial fibrillation ([facility] [address] )  Assessment & Plan  · New diagnosis, rate controlled on metoprolol  · Anticoagulated on Eliquis  · Cardiology following     Stage 3a chronic kidney disease [facility])  Assessment & Plan  Lab Results   Component Value Date    EGFR 62 03/05/2022    EGFR 55 03/04/2022    EGFR 54 03/03/2022    CREATININE 1 29 03/05/2022    CREATININE 1 43 (H) 03/04/2022    CREATININE 1 44 (H) 03/03/2022   · Baseline Cr appears to be between 1 3-1 7  · Cr stable at baseline  · Monitor with IV diuresis  · Avoid nephrotoxins    VICKY (obstructive sleep apnea)  Assessment & Plan  · CPAP at bedtime    Morbid obesity (HCC)  Assessment & Plan  Body mass index is 42 82 kg/m²      · Lifestyle modification    Type 2 diabetes mellitus with hyperglycemia [facility])  Assessment & Plan  Lab Results   Component Value Date    HGBA1C 7 0 (H) 11/10/2021       Recent Labs     22  1114 22  1620 22  0627 22  1107   POCGLU 196* 260* 217* 272*       Blood Sugar Average: Last 72 hrs:  (P) 003 [id number]   · Hold home oral meds while inpatient  · Steroid induced hyperglycemia  · Ct lantus 10  · Continue SSI coverage   · Diabetic diet  · Hypoglycemia protocol    Essential hypertension  Assessment & Plan  · Blood pressure stable, continue home medication metoprolol  · Also on IV Bumex      VTE Pharmacologic Prophylaxis: VTE Score: 6 High Risk (Score >/= 5) - Pharmacological DVT Prophylaxis Ordered: apixaban (Eliquis)  Sequential Compression Devices Ordered  Patient Centered Rounds: Discussed with RN    Education and Discussions with Family / Patient: Attempted to update  (wife) via phone  Left voicemail  Time Spent for Care: 20 minutes  More than 50% of total time spent on counseling and coordination of care as described above  Current Length of Stay: 3 day(s)  Current Patient Status: Inpatient   Certification Statement: The patient will continue to require additional inpatient hospital stay due to acute on chronic CHF    Code Status: Level 1 - Full Code    Subjective:   Shortness of breath improved  Objective:     Vitals:   Temp (24hrs), Av 7 °F (36 5 °C), Min:97 5 °F (36 4 °C), Max:97 8 °F (36 6 °C)    Temp:  [97 5 °F (36 4 °C)-97 8 °F (36 6 °C)] 97 8 °F (36 6 °C)  HR:  [65-87] 76  Resp:  [15-18] 18  BP: ()/(51-64) 115/58  SpO2:  [93 %-95 %] 93 %  Body mass index is 42 82 kg/m²  Input and Output Summary (last 24 hours): Intake/Output Summary (Last 24 hours) at 3/5/2022 1830  Last data filed at 3/5/2022 1801  Gross per 24 hour   Intake 960 ml   Output 2800 ml   Net -1840 ml       Physical Exam:   Physical Exam  Vitals reviewed  HENT:      Head: Normocephalic        Nose: Nose normal       Mouth/Throat:      Mouth: Mucous membranes are moist    Eyes:      Extraocular Movements: Extraocular movements intact  Cardiovascular:      Rate and Rhythm: Normal rate and regular rhythm  Pulmonary:      Effort: Pulmonary effort is normal  No respiratory distress  Breath sounds: Normal breath sounds  No wheezing  Abdominal:      General: Bowel sounds are normal  There is no distension  Palpations: Abdomen is soft  Musculoskeletal:      Cervical back: Neck supple  Comments: 1+ bilateral lower extremity edema   Skin:     General: Skin is warm and dry  Neurological:      General: No focal deficit present  Mental Status: He is alert and oriented to person, place, and time  Psychiatric:         Mood and Affect: Mood normal          Behavior: Behavior normal           Additional Data:     Labs:  Results from last 7 days   Lab Units 03/05/22  0536 03/04/22  0557 03/04/22  0557   WBC Thousand/uL 8 10   < > 8 55   HEMOGLOBIN g/dL 10 2*   < > 10 9*   HEMATOCRIT % 32 0*   < > 32 8*   PLATELETS Thousands/uL 183   < > 228   NEUTROS PCT %  --   --  85*   LYMPHS PCT %  --   --  8*   MONOS PCT %  --   --  6   EOS PCT %  --   --  0    < > = values in this interval not displayed  Results from last 7 days   Lab Units 03/05/22  0536 03/02/22  0530 03/01/22  0636   SODIUM mmol/L 136   < > 141   POTASSIUM mmol/L 4 2   < > 3 2*   CHLORIDE mmol/L 101   < > 100   CO2 mmol/L 31   < > 33*   BUN mg/dL 26*   < > 15   CREATININE mg/dL 1 29   < > 1 41*   ANION GAP mmol/L 4   < > 8   CALCIUM mg/dL 7 4*   < > 7 6*   ALBUMIN g/dL  --   --  2 7*   TOTAL BILIRUBIN mg/dL  --   --  0 80   ALK PHOS U/L  --   --  106   ALT U/L  --   --  13   AST U/L  --   --  12   GLUCOSE RANDOM mg/dL 213*   < > 118    < > = values in this interval not displayed           Results from last 7 days   Lab Units 03/05/22  1107 03/05/22  0627 03/04/22  1620 03/04/22  1114 03/04/22  0600 03/03/22  2117 03/03/22  1608 03/03/22  1046 03/03/22  0548 03/02/22  1543 03/02/22  1108 03/02/22  0553   POC GLUCOSE mg/dl 272* 217* 260* 196* 236* 222* 311* 269* 185* 355* 233* 185*         Results from last 7 days   Lab Units 03/02/22  0530 03/01/22  0636   PROCALCITONIN ng/ml 0 11 0 06       Lines/Drains:  Invasive Devices  Report    Peripheral Intravenous Line            Peripheral IV 03/04/22 Dorsal (posterior); Proximal;Right Forearm 1 day            Last 24 Hours Medication List:   Current Facility-Administered Medications   Medication Dose Route Frequency Provider Last Rate    acetaminophen  650 mg Oral Q6H PRN Lupis Marcum MD      albuterol  2 puff Inhalation Q4H PRN Lupis Marcum MD      albuterol  2 puff Inhalation 4x Daily Kathy Hagan MD      aluminum-magnesium hydroxide-simethicone  30 mL Oral Q6H PRN Lupis Marcum MD      apixaban  5 mg Oral BID Lupis Marcum MD      atorvastatin  10 mg Oral Daily Lupis Marcum MD      budesonide-formoterol  2 puff Inhalation BID Lupis Marcum MD      bumetanide  2 mg Intravenous BID Zac Kaba MD      docusate sodium  100 mg Oral BID Lupis Marcum MD      guaiFENesin  600 mg Oral BID Lupis Marcum MD      insulin glargine  10 Units Subcutaneous HS Lucinda Florence PA-C      insulin lispro  1-6 Units Subcutaneous TID AC Lupis Marcum MD      insulin lispro  1-6 Units Subcutaneous HS Lupis Marcum MD      melatonin  3 mg Oral HS PRN Kathy Hagan MD      methylPREDNISolone sodium succinate  20 mg Intravenous Q12H Annamaria Resendez MD      metoprolol succinate  50 mg Oral Daily Aster García, PA-C      montelukast  10 mg Oral HS Devang Ingram MD      ondansetron  4 mg Intravenous Q6H PRN Lupis Marcum MD      oxyCODONE-acetaminophen  1 tablet Oral Q4H PRN Lupis Marcum MD      potassium chloride  20 mEq Oral BID Lupis Marcum MD      tiotropium  18 mcg Inhalation Daily Lupis Marcum MD          Today, Patient Was Seen By: Kathy Hagan MD    **Please Note: This note may have been constructed using a voice recognition system  **

## 2022-03-05 NOTE — PLAN OF CARE
Problem: INFECTION - ADULT  Goal: Absence or prevention of progression during hospitalization  Description: INTERVENTIONS:  - Assess and monitor for signs and symptoms of infection  - Monitor lab/diagnostic results  - Monitor all insertion sites, i e  indwelling lines, tubes, and drains  - Monitor endotracheal if appropriate and nasal secretions for changes in amount and color  - [place] appropriate cooling/warming therapies per order  - Administer medications as ordered  - Instruct and encourage patient and family to use good hand hygiene technique  - Identify and instruct in appropriate isolation precautions for identified infection/condition  Outcome: Progressing  Goal: Absence of fever/infection during neutropenic period  Description: INTERVENTIONS:  - Monitor WBC    Outcome: Progressing

## 2022-03-05 NOTE — ASSESSMENT & PLAN NOTE
Lab Results   Component Value Date    EGFR 62 03/05/2022    EGFR 55 03/04/2022    EGFR 54 03/03/2022    CREATININE 1 29 03/05/2022    CREATININE 1 43 (H) 03/04/2022    CREATININE 1 44 (H) 03/03/2022   · Baseline Cr appears to be between 1 3-1 7  · Cr stable at baseline  · Monitor with IV diuresis  · Avoid nephrotoxins

## 2022-03-05 NOTE — PROGRESS NOTES
Advanced Heart Failure / Pulmonary Hypertension Service Progress Note    Raul Jarrlel 49 y o  male   MRN: [id number]  Unit/Bed#: CW2 218-01; Encounter: [id number]    Assessment:  Principal Problem:    Acute on chronic diastolic heart failure ([facility] [address] )  Active Problems:    Essential hypertension    Type 2 diabetes mellitus with hyperglycemia (HCC)    Morbid obesity (HCC)    VICKY (obstructive sleep apnea)    Moderate asthma with acute exacerbation    Stage 3a chronic kidney disease (HCC)    Paroxysmal atrial fibrillation (HCC)      Subjective:   Patient seen and examined  No significant events overnight  Has noted increase in urine output with switch to IV Bumex  Objective: Intake/ Output: 1440 mL / 2550 mL (net negative 1110 mL)  Weight: 315 lbs (317 lbs on 03/04)  Telemetry: NSR, rates in 80s; 40-50s qHS  Today's Plan:   Continue IV Bumex 2 mg BID; creatinine improved today s/p switching from Lasix   Hold parameter of metoprolol succinate modified   Asthma exacerbation treatment per primary team      Plan:  Acute on chronic HFpEF; LVEF 55%; LVIDd 6 0 cm; NYHA III; ACC/AHA Stage C              TTE 03/25/2020: LVEF 40-45%  LVIDd 6 12 cm  Normal RV  TTE 07/19/2021: LVEF 50%  LVIDd 6 13 cm  Grade 1 DD  Normal RV  Trace MR and TR  Mildly dilated IVC  TTE (limited) 11/12/2021: LVEF 55%  LVIDd 6 0 cm  Grade 1 DD  Normal RV  Trace TR       Neurohormonal Blockade:  --Beta Blocker: metoprolol succinate 50 mg daily  --ARNi / ACEi / ARB: No    --Aldosterone Antagonist: No    --SGLT2 Inhibitor: No   --Home Diuretic: torsemide 20 mg BID with potassium 20 mEq BID  --Inpatient Diuretic: IV Bumex 2 mg BID with potassium 20 mEq BID       Sudden Cardiac Death Risk Reduction:  --LVEF >35%     Electrical Resynchronization:  --Candidacy for BiV device: narrow QRS                   Advanced Therapies: Will continue to monitor      Asthma exacerbation   Management per primary team  Atrial fibrillation              [id number] = 3 (HF, HTN, DM)  Diagnosed 02/2022  Anticoagulation on Eliquis  Rate control: BB as above  Rhythm control: No      Hypertension  Hyperlipidemia  Diabetes mellitus, type II   Obstructive sleep apnea: awaiting new CPAP from company; device was recalled  Chronic respiratory failure  Asthma, moderate persistent   S/p gastric bypass (Samuel-en-Y)surgery   History of tobacco abuse  Carpal tunnel syndrome, bilateral     Vitals:   Blood pressure 100/55, pulse 87, temperature 97 5 °F (36 4 °C), resp  rate 16, height 6' (1 829 m), weight (!) 143 kg (315 lb 11 2 oz), SpO2 95 %  Body mass index is 42 82 kg/m²  I/O last 3 completed shifts: In: 2040 [P O :2040]  Out: 3950 [Urine:3950]    Wt Readings from Last 3 Encounters:   03/05/22 (!) 143 kg (315 lb 11 2 oz)   02/22/22 (!) 143 kg (315 lb 1 6 oz)   02/12/22 (!) 145 kg (319 lb)     Vitals:    03/04/22 2345 03/05/22 0328 03/05/22 0600 03/05/22 0814   BP:  99/51  100/55   Pulse:  67  87   Resp:  15  16   Temp:    97 5 °F (36 4 °C)   TempSrc:       SpO2: 94% 94%  95%   Weight:   (!) 143 kg (315 lb 11 2 oz)    Height:           Physical Exam  Vitals reviewed  Constitutional:       General: He is awake  He is not in acute distress  Appearance: Normal appearance  He is well-developed and overweight  HENT:      Head: Normocephalic  Nose: Nose normal       Mouth/Throat:      Mouth: Mucous membranes are moist    Eyes:      General: No scleral icterus  Conjunctiva/sclera: Conjunctivae normal    Neck:      Vascular: JVD present  Trachea: No tracheal deviation  Cardiovascular:      Rate and Rhythm: Normal rate and regular rhythm  No extrasystoles are present  Pulses: Normal pulses  Heart sounds: Murmur heard  Pulmonary:      Effort: Pulmonary effort is normal  No tachypnea, bradypnea or respiratory distress        Breath sounds: Normal air entry  No decreased air movement  Wheezing present  No decreased breath sounds or rales  Abdominal:      General: Bowel sounds are normal  There is distension  Palpations: Abdomen is soft  Tenderness: There is no abdominal tenderness  Musculoskeletal:      Cervical back: Neck supple  Right lower le+ Edema present  Left lower le+ Edema present  Skin:     General: Skin is warm and dry  Coloration: Skin is not jaundiced or pale  Neurological:      General: No focal deficit present  Mental Status: He is alert and oriented to person, place, and time  Psychiatric:         Attention and Perception: Attention normal          Mood and Affect: Mood and affect normal          Speech: Speech normal          Behavior: Behavior normal  Behavior is cooperative  Thought Content:  Thought content normal        Central Line: No   Cotton Catheter: No      Current Facility-Administered Medications:     acetaminophen (TYLENOL) tablet 650 mg, 650 mg, Oral, Q6H PRN, Yvon Nathan MD, 650 mg at 22 2017    albuterol (PROVENTIL HFA,VENTOLIN HFA) inhaler 2 puff, 2 puff, Inhalation, Q4H PRN, Yvon Nathan MD    albuterol (PROVENTIL HFA,VENTOLIN HFA) inhaler 2 puff, 2 puff, Inhalation, 4x Daily, Erica Howe MD    aluminum-magnesium hydroxide-simethicone (MYLANTA) oral suspension 30 mL, 30 mL, Oral, Q6H PRN, Yvon Nathan MD    apixaban (ELIQUIS) tablet 5 mg, 5 mg, Oral, BID, Devang Ingram MD, 5 mg at 22 0000    atorvastatin (LIPITOR) tablet 10 mg, 10 mg, Oral, Daily, Devang Ingram MD, 10 mg at 22 0943    budesonide-formoterol (SYMBICORT) 160-4 5 mcg/act inhaler 2 puff, 2 puff, Inhalation, BID, Yvon Nathan MD, 2 puff at 22 181    bumetanide (BUMEX) injection 2 mg, 2 mg, Intravenous, BID, Nicci Verde MD, 2 mg at 22 181    docusate sodium (COLACE) capsule 100 mg, 100 mg, Oral, BID, Yvon Nathan MD, 100 mg at 22 181    guaiFENesin (MUCINEX) 12 hr tablet 600 mg, 600 mg, Oral, BID, Devang Ingram MD, 600 mg at 03/04/22 1811    insulin glargine (LANTUS) subcutaneous injection 10 Units 0 1 mL, 10 Units, Subcutaneous, HS, Dolly Lantigua PA-C, 10 Units at 03/04/22 2358    insulin lispro (HumaLOG) 100 units/mL subcutaneous injection 1-6 Units, 1-6 Units, Subcutaneous, TID AC, 2 Units at 03/05/22 0706 **AND** Fingerstick Glucose (POCT), , , TID AC, Devang Ingram MD    insulin lispro (HumaLOG) 100 units/mL subcutaneous injection 1-6 Units, 1-6 Units, Subcutaneous, HS, Devang Ingram MD, 3 Units at 03/04/22 2359    melatonin tablet 3 mg, 3 mg, Oral, HS PRN, Jennifer Urias MD, 3 mg at 03/04/22 2358    methylPREDNISolone sodium succinate (Solu-MEDROL) injection 40 mg, 40 mg, Intravenous, Q12H [facility], Devang Ingram MD, 40 mg at 03/04/22 2358    metoprolol succinate (TOPROL-XL) 24 hr tablet 50 mg, 50 mg, Oral, Daily, Kaia Tello PA-C    montelukast (SINGULAIR) tablet 10 mg, 10 mg, Oral, HS, Devang Ingram MD, 10 mg at 03/04/22 2358    ondansetron (ZOFRAN) injection 4 mg, 4 mg, Intravenous, Q6H PRN, Chava Moran MD    oxyCODONE-acetaminophen (PERCOCET) 5-325 mg per tablet 1 tablet, 1 tablet, Oral, Q4H PRN, Chava Moran MD    potassium chloride (K-DUR,KLOR-CON) CR tablet 20 mEq, 20 mEq, Oral, BID, Devang Ingram MD, 20 mEq at 03/04/22 1810    tiotropium (SPIRIVA) capsule for inhaler 18 mcg, 18 mcg, Inhalation, Daily, Chava Moran MD, 18 mcg at 03/04/22 0940    Labs & Results:      Results from last 7 days   Lab Units 03/05/22  0536 03/04/22  0557 03/03/22  0521   WBC Thousand/uL 8 10 8 55 9 29   HEMOGLOBIN g/dL 10 2* 10 9* 10 3*   HEMATOCRIT % 32 0* 32 8* 31 0*   PLATELETS Thousands/uL 183 228 246         Results from last 7 days   Lab Units 03/05/22  0536 03/04/22  0557 03/03/22  0521 03/02/22  0530 03/01/22  0636   POTASSIUM mmol/L 4 2 4 1 4 0   < > 3 2*   CHLORIDE mmol/L 101 99* 100   < > 100   CO2 mmol/L 31 31 31   < > 33*   BUN mg/dL 26* 24 27* < > 15   CREATININE mg/dL 1 29 1 43* 1 44*   < > 1 41*   CALCIUM mg/dL 7 4* 7 1* 7 2*   < > 7 6*   ALK PHOS U/L  --   --   --   --  106   ALT U/L  --   --   --   --  13   AST U/L  --   --   --   --  12    < > = values in this interval not displayed           Barbie Johnson PA-C

## 2022-03-05 NOTE — ASSESSMENT & PLAN NOTE
Lab Results   Component Value Date    HGBA1C 7 0 (H) 11/10/2021       Recent Labs     03/04/22  1114 03/04/22  1620 03/05/22  0627 03/05/22  1107   POCGLU 196* 260* 217* 272*       Blood Sugar Average: Last 72 hrs:  (P) 550 [id number]   · Hold home oral meds while inpatient  · Steroid induced hyperglycemia  · Ct lantus 10  · Continue SSI coverage   · Diabetic diet  · Hypoglycemia protocol

## 2022-03-05 NOTE — ASSESSMENT & PLAN NOTE
· Patient with history of moderate asthma presented to ER with complaint of increasing shortness of breath, hypoxia, diffuse wheezing  · Decrease Solumedrol to 20 mg IV BID  · Continue home inhaler Symbicort, Spiriva, Singulair, Albuterol p r n    · Respiratory protocol on board

## 2022-03-05 NOTE — ASSESSMENT & PLAN NOTE
Wt Readings from Last 3 Encounters:   03/05/22 (!) 143 kg (315 lb 11 2 oz)   02/22/22 (!) 143 kg (315 lb 1 6 oz)   02/12/22 (!) 145 kg (319 lb)     · Multiple recent admissions with CHF exacerbation  · Patient on outpatient torsemide 40 mg b i d  which was increased recently  · Follows with Dr Augie Mora outpatient    · Not on home oxygen at baseline   · BNP elevated 805, CXR unremarkable  · Diuresis change from Lasix to Bumex 2 mg BID on 3/4 with clinical improvement  · Cardiology input appreciated  · Monitor I/O, daily weights

## 2022-03-06 LAB
ANION GAP SERPL CALCULATED.3IONS-SCNC: 4 MMOL/L (ref 4–13)
BUN SERPL-MCNC: 25 MG/DL (ref 5–25)
CALCIUM SERPL-MCNC: 7.2 MG/DL (ref 8.3–10.1)
CHLORIDE SERPL-SCNC: 102 MMOL/L (ref 100–108)
CO2 SERPL-SCNC: 30 MMOL/L (ref 21–32)
CREAT SERPL-MCNC: 1.26 MG/DL (ref 0.6–1.3)
GFR SERPL CREATININE-BSD FRML MDRD: 64 ML/MIN/1.73SQ M
GLUCOSE SERPL-MCNC: 115 MG/DL (ref 65–140)
GLUCOSE SERPL-MCNC: 149 MG/DL (ref 65–140)
GLUCOSE SERPL-MCNC: 171 MG/DL (ref 65–140)
GLUCOSE SERPL-MCNC: 179 MG/DL (ref 65–140)
GLUCOSE SERPL-MCNC: 311 MG/DL (ref 65–140)
POTASSIUM SERPL-SCNC: 4.2 MMOL/L (ref 3.5–5.3)
SODIUM SERPL-SCNC: 136 MMOL/L (ref 136–145)

## 2022-03-06 PROCEDURE — 82948 REAGENT STRIP/BLOOD GLUCOSE: CPT

## 2022-03-06 PROCEDURE — 99232 SBSQ HOSP IP/OBS MODERATE 35: CPT | Performed by: INTERNAL MEDICINE

## 2022-03-06 PROCEDURE — 99232 SBSQ HOSP IP/OBS MODERATE 35: CPT | Performed by: PHYSICIAN ASSISTANT

## 2022-03-06 PROCEDURE — 80048 BASIC METABOLIC PNL TOTAL CA: CPT | Performed by: INTERNAL MEDICINE

## 2022-03-06 RX ORDER — BUMETANIDE 0.25 MG/ML
2 INJECTION, SOLUTION INTRAMUSCULAR; INTRAVENOUS 2 TIMES DAILY
Status: COMPLETED | OUTPATIENT
Start: 2022-03-06 | End: 2022-03-06

## 2022-03-06 RX ORDER — INSULIN GLARGINE 100 [IU]/ML
14 INJECTION, SOLUTION SUBCUTANEOUS
Status: DISCONTINUED | OUTPATIENT
Start: 2022-03-06 | End: 2022-03-10 | Stop reason: HOSPADM

## 2022-03-06 RX ADMIN — ATORVASTATIN CALCIUM 10 MG: 10 TABLET, FILM COATED ORAL at 09:08

## 2022-03-06 RX ADMIN — TIOTROPIUM BROMIDE 18 MCG: 18 CAPSULE ORAL; RESPIRATORY (INHALATION) at 09:06

## 2022-03-06 RX ADMIN — DOCUSATE SODIUM 100 MG: 100 CAPSULE ORAL at 18:18

## 2022-03-06 RX ADMIN — DOCUSATE SODIUM 100 MG: 100 CAPSULE ORAL at 09:08

## 2022-03-06 RX ADMIN — GUAIFENESIN 600 MG: 600 TABLET, EXTENDED RELEASE ORAL at 18:18

## 2022-03-06 RX ADMIN — BUDESONIDE AND FORMOTEROL FUMARATE DIHYDRATE 2 PUFF: 160; 4.5 AEROSOL RESPIRATORY (INHALATION) at 09:06

## 2022-03-06 RX ADMIN — POTASSIUM CHLORIDE 20 MEQ: 1500 TABLET, EXTENDED RELEASE ORAL at 18:18

## 2022-03-06 RX ADMIN — BUDESONIDE AND FORMOTEROL FUMARATE DIHYDRATE 2 PUFF: 160; 4.5 AEROSOL RESPIRATORY (INHALATION) at 18:18

## 2022-03-06 RX ADMIN — APIXABAN 5 MG: 5 TABLET, FILM COATED ORAL at 09:08

## 2022-03-06 RX ADMIN — Medication 3 MG: at 21:50

## 2022-03-06 RX ADMIN — METOPROLOL SUCCINATE 50 MG: 50 TABLET, EXTENDED RELEASE ORAL at 09:08

## 2022-03-06 RX ADMIN — INSULIN LISPRO 1 UNITS: 100 INJECTION, SOLUTION INTRAVENOUS; SUBCUTANEOUS at 11:26

## 2022-03-06 RX ADMIN — POTASSIUM CHLORIDE 20 MEQ: 1500 TABLET, EXTENDED RELEASE ORAL at 09:08

## 2022-03-06 RX ADMIN — ALBUTEROL SULFATE 2 PUFF: 90 AEROSOL, METERED RESPIRATORY (INHALATION) at 11:25

## 2022-03-06 RX ADMIN — INSULIN LISPRO 1 UNITS: 100 INJECTION, SOLUTION INTRAVENOUS; SUBCUTANEOUS at 06:40

## 2022-03-06 RX ADMIN — ALBUTEROL SULFATE 2 PUFF: 90 AEROSOL, METERED RESPIRATORY (INHALATION) at 18:18

## 2022-03-06 RX ADMIN — BUMETANIDE 2 MG: 0.25 INJECTION INTRAMUSCULAR; INTRAVENOUS at 18:19

## 2022-03-06 RX ADMIN — APIXABAN 5 MG: 5 TABLET, FILM COATED ORAL at 21:45

## 2022-03-06 RX ADMIN — BUMETANIDE 2 MG: 0.25 INJECTION INTRAMUSCULAR; INTRAVENOUS at 09:08

## 2022-03-06 RX ADMIN — INSULIN GLARGINE 14 UNITS: 100 INJECTION, SOLUTION SUBCUTANEOUS at 21:45

## 2022-03-06 RX ADMIN — INSULIN LISPRO 5 UNITS: 100 INJECTION, SOLUTION INTRAVENOUS; SUBCUTANEOUS at 18:17

## 2022-03-06 RX ADMIN — GUAIFENESIN 600 MG: 600 TABLET, EXTENDED RELEASE ORAL at 09:08

## 2022-03-06 RX ADMIN — ALBUTEROL SULFATE 2 PUFF: 90 AEROSOL, METERED RESPIRATORY (INHALATION) at 09:06

## 2022-03-06 RX ADMIN — METHYLPREDNISOLONE SODIUM SUCCINATE 20 MG: 40 INJECTION, POWDER, FOR SOLUTION INTRAMUSCULAR; INTRAVENOUS at 09:07

## 2022-03-06 RX ADMIN — MONTELUKAST 10 MG: 10 TABLET, FILM COATED ORAL at 21:45

## 2022-03-06 RX ADMIN — ALBUTEROL SULFATE 2 PUFF: 90 AEROSOL, METERED RESPIRATORY (INHALATION) at 21:44

## 2022-03-06 NOTE — ASSESSMENT & PLAN NOTE
Lab Results   Component Value Date    EGFR 64 03/06/2022    EGFR 62 03/05/2022    EGFR 55 03/04/2022    CREATININE 1 26 03/06/2022    CREATININE 1 29 03/05/2022    CREATININE 1 43 (H) 03/04/2022   · Baseline Cr appears to be between 1 3-1 7  · Cr stable at baseline  · Monitor with IV diuresis  · Avoid nephrotoxins

## 2022-03-06 NOTE — ASSESSMENT & PLAN NOTE
Wt Readings from Last 3 Encounters:   03/06/22 (!) 143 kg (314 lb 13 1 oz)   02/22/22 (!) 143 kg (315 lb 1 6 oz)   02/12/22 (!) 145 kg (319 lb)     · Multiple recent admissions with CHF exacerbation  · Patient on outpatient torsemide 40 mg b i d  which was increased recently  · Follows with Dr Gen Spann outpatient    · Not on home oxygen at baseline   · BNP elevated 805, CXR unremarkable  · Diuresis changed from Lasix to Bumex 2 mg BID on 3/4 with clinical improvement  · Cardiology input appreciated  · Monitor I/O, daily weights

## 2022-03-06 NOTE — ASSESSMENT & PLAN NOTE
· Has CPAP machine at home but not using it since a few months as it has been recalled  · Declines using hospital CPAP  · Awaiting new CPAP at home

## 2022-03-06 NOTE — PROGRESS NOTES
[address] [facility]  Progress Note - Nikki Robledo 1965, 49 y o  male MRN: [id number]  Unit/Bed#: CW2 218-01 Encounter: [id number]  Primary Care Provider: Zay Delarosa MD   Date and time admitted to hospital: 3/1/2022  6:05 AM    * Acute on chronic diastolic heart failure [facility])  Assessment & Plan  Wt Readings from Last 3 Encounters:   03/06/22 (!) 143 kg (314 lb 13 1 oz)   02/22/22 (!) 143 kg (315 lb 1 6 oz)   02/12/22 (!) 145 kg (319 lb)     · Multiple recent admissions with CHF exacerbation  · Patient on outpatient torsemide 40 mg b i d  which was increased recently  · Follows with Dr Abdias Ba outpatient  · Not on home oxygen at baseline   · BNP elevated 805, CXR unremarkable  · Diuresis changed from Lasix to Bumex 2 mg BID on 3/4 with clinical improvement  · Cardiology input appreciated  · Monitor I/O, daily weights    Moderate asthma with acute exacerbation  Assessment & Plan  · Patient with history of moderate asthma presented to ER with complaint of increasing shortness of breath, hypoxia, diffuse wheezing  · Solumedrol D/c'd after am dose  · Begin Prednisone from 3/7  · Continue home inhaler Symbicort, Spiriva, Singulair, Albuterol p r n    · Respiratory protocol on board     Paroxysmal atrial fibrillation ([facility] [address] )  Assessment & Plan  · New diagnosis, rate controlled on metoprolol  · Anticoagulated on Eliquis  · Cardiology following     Stage 3a chronic kidney disease [facility])  Assessment & Plan  Lab Results   Component Value Date    EGFR 64 03/06/2022    EGFR 62 03/05/2022    EGFR 55 03/04/2022    CREATININE 1 26 03/06/2022    CREATININE 1 29 03/05/2022    CREATININE 1 43 (H) 03/04/2022   · Baseline Cr appears to be between 1 3-1 7  · Cr stable at baseline  · Monitor with IV diuresis  · Avoid nephrotoxins    VICKY (obstructive sleep apnea)  Assessment & Plan  · Has CPAP machine at home but not using it since a few months as it has been recalled  · Declines using hospital CPAP  · Awaiting new CPAP at home    Morbid obesity [facility])  Assessment & Plan  Body mass index is 42 7 kg/m²  · Lifestyle modification    Type 2 diabetes mellitus with hyperglycemia [facility])  Assessment & Plan  Lab Results   Component Value Date    HGBA1C 7 0 (H) 11/10/2021       Recent Labs     22  2132 22  0630 22  1058 22  1536   POCGLU 212* 171* 179* 311*       Blood Sugar Average: Last 72 hrs:  (P) 245 [id number]   · Hold home oral meds while inpatient  · Steroid induced hyperglycemia  · Increase Lantus to 14 hs, add scheduled Humalog 3 TID  · Continue SSI coverage   · Diabetic diet  · Hypoglycemia protocol  · Monitor blood sugars and adjust insulin as needed    Essential hypertension  Assessment & Plan  · Blood pressure stable, continue home medication metoprolol  · Also on IV Bumex      VTE Pharmacologic Prophylaxis: VTE Score: 6 High Risk (Score >/= 5) - Pharmacological DVT Prophylaxis Ordered: apixaban (Eliquis)  Sequential Compression Devices Ordered  Patient Centered Rounds: Discussed with RN    Education and Discussions with Family / Patient: Attempted to update  (wife) via phone  Left voicemail  Time Spent for Care: 20 minutes  More than 50% of total time spent on counseling and coordination of care as described above  Current Length of Stay: 4 day(s)  Current Patient Status: Inpatient   Certification Statement: The patient will continue to require additional inpatient hospital stay due to acute on chronic CHF    Code Status: Level 1 - Full Code    Subjective:   Shortness of breath continues to improve  Objective:     Vitals:   Temp (24hrs), Av 8 °F (36 6 °C), Min:97 2 °F (36 2 °C), Max:98 1 °F (36 7 °C)    Temp:  [97 2 °F (36 2 °C)-98 1 °F (36 7 °C)] 98 1 °F (36 7 °C)  HR:  [59-71] 59  Resp:  [13-18] 16  BP: (104-115)/(49-59) 112/55  SpO2:  [94 %-97 %] 95 %  Body mass index is 42 7 kg/m²  Physical Exam:   Physical Exam  Vitals reviewed  HENT:      Head: Normocephalic  Nose: Nose normal       Mouth/Throat:      Mouth: Mucous membranes are moist    Eyes:      Extraocular Movements: Extraocular movements intact  Cardiovascular:      Rate and Rhythm: Normal rate and regular rhythm  Pulmonary:      Effort: Pulmonary effort is normal  No respiratory distress  Breath sounds: Normal breath sounds  No wheezing  Abdominal:      General: Bowel sounds are normal  There is no distension  Palpations: Abdomen is soft  Tenderness: There is no abdominal tenderness  Musculoskeletal:      Cervical back: Neck supple  Comments: 1+ bilateral lower extremity edema   Neurological:      General: No focal deficit present  Mental Status: He is alert and oriented to person, place, and time  Psychiatric:         Mood and Affect: Mood normal          Behavior: Behavior normal           Additional Data:     Labs:  Results from last 7 days   Lab Units 03/05/22  0536 03/04/22  0557 03/04/22  0557   WBC Thousand/uL 8 10   < > 8 55   HEMOGLOBIN g/dL 10 2*   < > 10 9*   HEMATOCRIT % 32 0*   < > 32 8*   PLATELETS Thousands/uL 183   < > 228   NEUTROS PCT %  --   --  85*   LYMPHS PCT %  --   --  8*   MONOS PCT %  --   --  6   EOS PCT %  --   --  0    < > = values in this interval not displayed  Results from last 7 days   Lab Units 03/06/22  0539 03/02/22  0530 03/01/22  0636   SODIUM mmol/L 136   < > 141   POTASSIUM mmol/L 4 2   < > 3 2*   CHLORIDE mmol/L 102   < > 100   CO2 mmol/L 30   < > 33*   BUN mg/dL 25   < > 15   CREATININE mg/dL 1 26   < > 1 41*   ANION GAP mmol/L 4   < > 8   CALCIUM mg/dL 7 2*   < > 7 6*   ALBUMIN g/dL  --   --  2 7*   TOTAL BILIRUBIN mg/dL  --   --  0 80   ALK PHOS U/L  --   --  106   ALT U/L  --   --  13   AST U/L  --   --  12   GLUCOSE RANDOM mg/dL 149*   < > 118    < > = values in this interval not displayed           Results from last 7 days   Lab Units 03/06/22  1536 03/06/22  1058 03/06/22  0630 03/05/22  2132 03/05/22  1812 03/05/22  1107 03/05/22  0627 03/04/22  1620 03/04/22  1114 03/04/22  0600 03/03/22  2117 03/03/22  1608   POC GLUCOSE mg/dl 311* 179* 171* 212* 390* 272* 217* 260* 196* 236* 222* 311*         Results from last 7 days   Lab Units 03/02/22  0530 03/01/22  0636   PROCALCITONIN ng/ml 0 11 0 06       Lines/Drains:  Invasive Devices  Report    Peripheral Intravenous Line            Peripheral IV 03/04/22 Dorsal (posterior); Proximal;Right Forearm 2 days                Last 24 Hours Medication List:   Current Facility-Administered Medications   Medication Dose Route Frequency Provider Last Rate    acetaminophen  650 mg Oral Q6H PRN Fei Castillo MD      albuterol  2 puff Inhalation Q4H PRN Fei Castillo MD      albuterol  2 puff Inhalation 4x Daily Aida Tomlinson MD      aluminum-magnesium hydroxide-simethicone  30 mL Oral Q6H PRN Fei Castillo MD      apixaban  5 mg Oral BID Fei Castillo MD      atorvastatin  10 mg Oral Daily Fei Castillo MD      budesonide-formoterol  2 puff Inhalation BID Fei Castillo MD      docusate sodium  100 mg Oral BID Fei Castillo MD      guaiFENesin  600 mg Oral BID Fei Castillo MD      insulin glargine  14 Units Subcutaneous HS Aida Tomlinson MD      insulin lispro  1-6 Units Subcutaneous TID AC Fei Castillo MD      insulin lispro  1-6 Units Subcutaneous HS Fei Castillo MD      insulin lispro  3 Units Subcutaneous TID With Meals Aida Tomlinson MD      melatonin  3 mg Oral HS PRN Aida Tomlinson MD      metoprolol succinate  50 mg Oral Daily Aster García PA-C      montelukast  10 mg Oral HS Devang Ingram MD      ondansetron  4 mg Intravenous Q6H PRN Fei Castillo MD      oxyCODONE-acetaminophen  1 tablet Oral Q4H PRN Fei Castillo MD      potassium chloride  20 mEq Oral BID Fei Castillo MD      [START ON 3/7/2022] predniSONE  30 mg Oral Daily Aida Tomlinson MD      tiotropium  18 mcg Inhalation Daily Fei Castillo MD          Today, Patient Was Seen By: Bethanie Garcia MD    **Please Note: This note may have been constructed using a voice recognition system  **

## 2022-03-06 NOTE — PLAN OF CARE
Problem: PAIN - ADULT  Goal: Verbalizes/displays adequate comfort level or baseline comfort level  Description: Interventions:  - Encourage patient to monitor pain and request assistance  - Assess pain using appropriate pain scale  - Administer analgesics based on type and severity of pain and evaluate response  - Implement non-pharmacological measures as appropriate and evaluate response  - Consider cultural and social influences on pain and pain management  - Notify physician/advanced practitioner if interventions unsuccessful or patient reports new pain  Outcome: Progressing     Problem: INFECTION - ADULT  Goal: Absence or prevention of progression during hospitalization  Description: INTERVENTIONS:  - Assess and monitor for signs and symptoms of infection  - Monitor lab/diagnostic results  - Monitor all insertion sites, i e  indwelling lines, tubes, and drains  - Monitor endotracheal if appropriate and nasal secretions for changes in amount and color  - [place] appropriate cooling/warming therapies per order  - Administer medications as ordered  - Instruct and encourage patient and family to use good hand hygiene technique  - Identify and instruct in appropriate isolation precautions for identified infection/condition  Outcome: Progressing  Goal: Absence of fever/infection during neutropenic period  Description: INTERVENTIONS:  - Monitor WBC    Outcome: Progressing

## 2022-03-06 NOTE — ASSESSMENT & PLAN NOTE
· Patient with history of moderate asthma presented to ER with complaint of increasing shortness of breath, hypoxia, diffuse wheezing  · Solumedrol D/c'd after am dose  · Begin Prednisone from 3/7  · Continue home inhaler Symbicort, Spiriva, Singulair, Albuterol p r n    · Respiratory protocol on board

## 2022-03-06 NOTE — PROGRESS NOTES
Advanced Heart Failure / Pulmonary Hypertension Service Progress Note    Lisa Carey 49 y o  male   MRN: [id number]  Unit/Bed#: CW2 218-01; Encounter: [id number]    Assessment:  Principal Problem:    Acute on chronic diastolic heart failure ([address] )  Active Problems:    Essential hypertension    Type 2 diabetes mellitus with hyperglycemia (HCC)    Morbid obesity (HCC)    VICKY (obstructive sleep apnea)    Moderate asthma with acute exacerbation    Stage 3a chronic kidney disease (HCC)    Paroxysmal atrial fibrillation (HCC)      Subjective:   Patient seen and examined  No significant events overnight  Feel breathing and wheezing continues to improve/resolve  No current complaints  Objective: Intake/ Output: 1780 mL / 3250 mL (net negative 1470 mL)  Weight: 314 lbs (315 lbs on 03/05)  Telemetry: NSR, rates in 80s  Today's Plan:   Continue IV Bumex 2 mg BID for today  Consider transition to equivalent PO dose tomorrow AM    Blaise Lamprey Potassium 4 2 this AM; continue on current potassium regimen   Asthma exacerbation treatment per primary team      Plan:  Acute on chronic HFpEF; LVEF 55%; LVIDd 6 0 cm; NYHA III; ACC/AHA Stage C              TTE 03/25/2020: LVEF 40-45%  LVIDd 6 12 cm  Normal RV  TTE 07/19/2021: LVEF 50%  LVIDd 6 13 cm  Grade 1 DD  Normal RV  Trace MR and TR  Mildly dilated IVC  TTE (limited) 11/12/2021: LVEF 55%  LVIDd 6 0 cm  Grade 1 DD  Normal RV  Trace TR       Neurohormonal Blockade:  --Beta Blocker: metoprolol succinate 50 mg daily  --ARNi / ACEi / ARB: No    --Aldosterone Antagonist: No    --SGLT2 Inhibitor: No   --Home Diuretic: torsemide 20 mg BID with potassium 20 mEq BID  --Inpatient Diuretic: IV Bumex 2 mg BID with potassium 20 mEq BID       Sudden Cardiac Death Risk Reduction:  --LVEF >35%     Electrical Resynchronization:  --Candidacy for BiV device: narrow QRS                   Advanced Therapies: Will continue to monitor      Asthma exacerbation   Management per primary team      Atrial fibrillation, paroxysmal              [id number] = 3 (HF, HTN, DM)  Diagnosed 02/2022  Anticoagulation on Eliquis  Rate control: BB as above  Rhythm control: No      Hypertension  Hyperlipidemia  Diabetes mellitus, type II   Obstructive sleep apnea: awaiting new CPAP from company; device was recalled  Chronic respiratory failure  Asthma, moderate persistent   S/p gastric bypass (Samuel-en-Y) surgery   History of tobacco abuse  Carpal tunnel syndrome, bilateral     Vitals:   Blood pressure 104/52, pulse 69, temperature 98 1 °F (36 7 °C), resp  rate 18, height 6' (1 829 m), weight (!) 143 kg (314 lb 13 1 oz), SpO2 97 %  Body mass index is 42 7 kg/m²  I/O last 3 completed shifts: In: 3799 [P O :1300; I V :480]  Out: 4200 [Urine:4200]    Wt Readings from Last 3 Encounters:   03/06/22 (!) 143 kg (314 lb 13 1 oz)   02/22/22 (!) 143 kg (315 lb 1 6 oz)   02/12/22 (!) 145 kg (319 lb)     Vitals:    03/05/22 2346 03/06/22 0401 03/06/22 0543 03/06/22 0729   BP: (!) 104/49   104/52   BP Location:       Pulse: 60 64  69   Resp: 13 18     Temp: 97 6 °F (36 4 °C) (!) 97 2 °F (36 2 °C)  98 1 °F (36 7 °C)   TempSrc:       SpO2: 95% 97%  97%   Weight:   (!) 143 kg (314 lb 13 1 oz)    Height:           Physical Exam  Vitals reviewed  Constitutional:       General: He is awake  He is not in acute distress  Appearance: Normal appearance  He is well-developed and overweight  HENT:      Head: Normocephalic  Nose: Nose normal       Mouth/Throat:      Mouth: Mucous membranes are moist    Eyes:      General: No scleral icterus  Conjunctiva/sclera: Conjunctivae normal    Neck:      Vascular: JVD present  Trachea: No tracheal deviation  Cardiovascular:      Rate and Rhythm: Normal rate and regular rhythm  No extrasystoles are present  Pulses: Normal pulses        Heart sounds: No murmur heard       Pulmonary:      Effort: Pulmonary effort is normal  No tachypnea, bradypnea or respiratory distress  Breath sounds: Normal air entry  No decreased air movement  No decreased breath sounds, rhonchi or rales  Abdominal:      General: Bowel sounds are normal  There is distension  Palpations: Abdomen is soft  Tenderness: There is no abdominal tenderness  Musculoskeletal:      Cervical back: Neck supple  Right lower le+ Edema present  Left lower le+ Edema present  Skin:     General: Skin is warm and dry  Coloration: Skin is pale  Skin is not jaundiced  Neurological:      General: No focal deficit present  Mental Status: He is alert and oriented to person, place, and time  Psychiatric:         Attention and Perception: Attention normal          Mood and Affect: Mood and affect normal          Speech: Speech normal          Behavior: Behavior normal  Behavior is cooperative  Thought Content:  Thought content normal      Central Line: No   Cotton Catheter: No      Current Facility-Administered Medications:     acetaminophen (TYLENOL) tablet 650 mg, 650 mg, Oral, Q6H PRN, Daniel Francois MD, 650 mg at 22    albuterol (PROVENTIL HFA,VENTOLIN HFA) inhaler 2 puff, 2 puff, Inhalation, Q4H PRN, Daniel Francois MD    albuterol (PROVENTIL HFA,VENTOLIN HFA) inhaler 2 puff, 2 puff, Inhalation, 4x Daily, Vladimir Murphy MD, 2 puff at 22    aluminum-magnesium hydroxide-simethicone (MYLANTA) oral suspension 30 mL, 30 mL, Oral, Q6H PRN, Daniel Francois MD    apixaban (ELIQUIS) tablet 5 mg, 5 mg, Oral, BID, Devang Ingram MD, 5 mg at 22 225    atorvastatin (LIPITOR) tablet 10 mg, 10 mg, Oral, Daily, Devang Ingram MD, 10 mg at 22 1038    budesonide-formoterol (SYMBICORT) 160-4 5 mcg/act inhaler 2 puff, 2 puff, Inhalation, BID, Daniel Francois MD, 2 puff at 22 1807    bumetanide (BUMEX) injection 2 mg, 2 mg, Intravenous, BID, Sharon Paez MARINA García PA-C    docusate sodium (COLACE) capsule 100 mg, 100 mg, Oral, BID, Devang Ingram MD, 100 mg at 03/05/22 1803    guaiFENesin (MUCINEX) 12 hr tablet 600 mg, 600 mg, Oral, BID, Devang Ingram MD, 600 mg at 03/05/22 1803    insulin glargine (LANTUS) subcutaneous injection 10 Units 0 1 mL, 10 Units, Subcutaneous, HS, Joaquin Verde PA-C, 10 Units at 03/05/22 2254    insulin lispro (HumaLOG) 100 units/mL subcutaneous injection 1-6 Units, 1-6 Units, Subcutaneous, TID AC, 1 Units at 03/06/22 0640 **AND** Fingerstick Glucose (POCT), , , TID AC, Devang Ingram MD    insulin lispro (HumaLOG) 100 units/mL subcutaneous injection 1-6 Units, 1-6 Units, Subcutaneous, HS, Devang Ingram MD, 2 Units at 03/05/22 2252    melatonin tablet 3 mg, 3 mg, Oral, HS PRN, Liberty Brand MD, 3 mg at 03/05/22 2255    methylPREDNISolone sodium succinate (Solu-MEDROL) injection 20 mg, 20 mg, Intravenous, Q12H [address], Liberty Brand MD, 20 mg at 03/05/22 2255    metoprolol succinate (TOPROL-XL) 24 hr tablet 50 mg, 50 mg, Oral, Daily, Frandy Samano PA-C, 50 mg at 03/05/22 1038    montelukast (SINGULAIR) tablet 10 mg, 10 mg, Oral, HS, Devang Ingram MD, 10 mg at 03/05/22 2255    ondansetron (ZOFRAN) injection 4 mg, 4 mg, Intravenous, Q6H PRN, Neha Talley MD    oxyCODONE-acetaminophen (PERCOCET) 5-325 mg per tablet 1 tablet, 1 tablet, Oral, Q4H PRN, Neha Talley MD    potassium chloride (K-DUR,KLOR-CON) CR tablet 20 mEq, 20 mEq, Oral, BID, Devang Ingram MD, 20 mEq at 03/05/22 1803    tiotropium (SPIRIVA) capsule for inhaler 18 mcg, 18 mcg, Inhalation, Daily, Devang Ingram MD, 18 mcg at 03/05/22 1036    Labs & Results:      Results from last 7 days   Lab Units 03/05/22  0536 03/04/22  0557 03/03/22  0521   WBC Thousand/uL 8 10 8 55 9 29   HEMOGLOBIN g/dL 10 2* 10 9* 10 3*   HEMATOCRIT % 32 0* 32 8* 31 0*   PLATELETS Thousands/uL 183 228 246         Results from last 7 days   Lab Units 03/06/22  0539 03/05/22  0536 03/04/22  0557 03/02/22  0530 03/01/22  0636   POTASSIUM mmol/L 4 2 4 2 4 1   < > 3 2*   CHLORIDE mmol/L 102 101 99*   < > 100   CO2 mmol/L 30 31 31   < > 33*   BUN mg/dL 25 26* 24   < > 15   CREATININE mg/dL 1 26 1 29 1 43*   < > 1 41*   CALCIUM mg/dL 7 2* 7 4* 7 1*   < > 7 6*   ALK PHOS U/L  --   --   --   --  106   ALT U/L  --   --   --   --  13   AST U/L  --   --   --   --  12    < > = values in this interval not displayed           Isadora Palumbo PA-C

## 2022-03-06 NOTE — ASSESSMENT & PLAN NOTE
Lab Results   Component Value Date    HGBA1C 7 0 (H) 11/10/2021       Recent Labs     03/05/22  2132 03/06/22  0630 03/06/22  1058 03/06/22  1536   POCGLU 212* 171* 179* 311*       Blood Sugar Average: Last 72 hrs:  (P) 245 [id number]   · Hold home oral meds while inpatient  · Steroid induced hyperglycemia  · Increase Lantus to 14 hs, add scheduled Humalog 3 TID  · Continue SSI coverage   · Diabetic diet  · Hypoglycemia protocol  · Monitor blood sugars and adjust insulin as needed

## 2022-03-07 LAB
ANION GAP SERPL CALCULATED.3IONS-SCNC: 2 MMOL/L (ref 4–13)
BUN SERPL-MCNC: 28 MG/DL (ref 5–25)
CALCIUM SERPL-MCNC: 7.1 MG/DL (ref 8.3–10.1)
CHLORIDE SERPL-SCNC: 103 MMOL/L (ref 100–108)
CO2 SERPL-SCNC: 33 MMOL/L (ref 21–32)
CREAT SERPL-MCNC: 1.37 MG/DL (ref 0.6–1.3)
EST. AVERAGE GLUCOSE BLD GHB EST-MCNC: 134 MG/DL
GFR SERPL CREATININE-BSD FRML MDRD: 58 ML/MIN/1.73SQ M
GLUCOSE SERPL-MCNC: 105 MG/DL (ref 65–140)
GLUCOSE SERPL-MCNC: 116 MG/DL (ref 65–140)
GLUCOSE SERPL-MCNC: 178 MG/DL (ref 65–140)
GLUCOSE SERPL-MCNC: 227 MG/DL (ref 65–140)
GLUCOSE SERPL-MCNC: 232 MG/DL (ref 65–140)
HBA1C MFR BLD: 6.3 %
POTASSIUM SERPL-SCNC: 3.9 MMOL/L (ref 3.5–5.3)
SODIUM SERPL-SCNC: 138 MMOL/L (ref 136–145)

## 2022-03-07 PROCEDURE — 99232 SBSQ HOSP IP/OBS MODERATE 35: CPT | Performed by: INTERNAL MEDICINE

## 2022-03-07 PROCEDURE — 82948 REAGENT STRIP/BLOOD GLUCOSE: CPT

## 2022-03-07 PROCEDURE — 3044F HG A1C LEVEL LT 7.0%: CPT | Performed by: INTERNAL MEDICINE

## 2022-03-07 PROCEDURE — 83036 HEMOGLOBIN GLYCOSYLATED A1C: CPT | Performed by: INTERNAL MEDICINE

## 2022-03-07 PROCEDURE — 80048 BASIC METABOLIC PNL TOTAL CA: CPT | Performed by: INTERNAL MEDICINE

## 2022-03-07 RX ORDER — PREDNISONE 20 MG/1
20 TABLET ORAL DAILY
Status: COMPLETED | OUTPATIENT
Start: 2022-03-09 | End: 2022-03-10

## 2022-03-07 RX ORDER — BUMETANIDE 2 MG/1
2 TABLET ORAL 2 TIMES DAILY
Status: DISCONTINUED | OUTPATIENT
Start: 2022-03-07 | End: 2022-03-08

## 2022-03-07 RX ORDER — PREDNISONE 10 MG/1
10 TABLET ORAL DAILY
Status: DISCONTINUED | OUTPATIENT
Start: 2022-03-11 | End: 2022-03-10 | Stop reason: HOSPADM

## 2022-03-07 RX ADMIN — DOCUSATE SODIUM 100 MG: 100 CAPSULE ORAL at 08:33

## 2022-03-07 RX ADMIN — APIXABAN 5 MG: 5 TABLET, FILM COATED ORAL at 08:32

## 2022-03-07 RX ADMIN — BUMETANIDE 2 MG: 2 TABLET ORAL at 17:34

## 2022-03-07 RX ADMIN — BUMETANIDE 2 MG: 2 TABLET ORAL at 12:46

## 2022-03-07 RX ADMIN — MONTELUKAST 10 MG: 10 TABLET, FILM COATED ORAL at 22:17

## 2022-03-07 RX ADMIN — APIXABAN 5 MG: 5 TABLET, FILM COATED ORAL at 22:16

## 2022-03-07 RX ADMIN — METOPROLOL SUCCINATE 50 MG: 50 TABLET, EXTENDED RELEASE ORAL at 08:33

## 2022-03-07 RX ADMIN — ATORVASTATIN CALCIUM 10 MG: 10 TABLET, FILM COATED ORAL at 08:33

## 2022-03-07 RX ADMIN — POTASSIUM CHLORIDE 20 MEQ: 1500 TABLET, EXTENDED RELEASE ORAL at 17:29

## 2022-03-07 RX ADMIN — DOCUSATE SODIUM 100 MG: 100 CAPSULE ORAL at 17:29

## 2022-03-07 RX ADMIN — BUDESONIDE AND FORMOTEROL FUMARATE DIHYDRATE 2 PUFF: 160; 4.5 AEROSOL RESPIRATORY (INHALATION) at 08:33

## 2022-03-07 RX ADMIN — TIOTROPIUM BROMIDE 18 MCG: 18 CAPSULE ORAL; RESPIRATORY (INHALATION) at 08:34

## 2022-03-07 RX ADMIN — ALBUTEROL SULFATE 2 PUFF: 90 AEROSOL, METERED RESPIRATORY (INHALATION) at 17:35

## 2022-03-07 RX ADMIN — ALBUTEROL SULFATE 2 PUFF: 90 AEROSOL, METERED RESPIRATORY (INHALATION) at 08:32

## 2022-03-07 RX ADMIN — GUAIFENESIN 600 MG: 600 TABLET, EXTENDED RELEASE ORAL at 08:33

## 2022-03-07 RX ADMIN — POTASSIUM CHLORIDE 20 MEQ: 1500 TABLET, EXTENDED RELEASE ORAL at 08:33

## 2022-03-07 RX ADMIN — GUAIFENESIN 600 MG: 600 TABLET, EXTENDED RELEASE ORAL at 17:29

## 2022-03-07 RX ADMIN — INSULIN LISPRO 3 UNITS: 100 INJECTION, SOLUTION INTRAVENOUS; SUBCUTANEOUS at 16:29

## 2022-03-07 RX ADMIN — INSULIN LISPRO 3 UNITS: 100 INJECTION, SOLUTION INTRAVENOUS; SUBCUTANEOUS at 16:28

## 2022-03-07 RX ADMIN — BUDESONIDE AND FORMOTEROL FUMARATE DIHYDRATE 2 PUFF: 160; 4.5 AEROSOL RESPIRATORY (INHALATION) at 17:30

## 2022-03-07 RX ADMIN — ALBUTEROL SULFATE 2 PUFF: 90 AEROSOL, METERED RESPIRATORY (INHALATION) at 11:08

## 2022-03-07 RX ADMIN — INSULIN GLARGINE 14 UNITS: 100 INJECTION, SOLUTION SUBCUTANEOUS at 22:16

## 2022-03-07 RX ADMIN — INSULIN LISPRO 3 UNITS: 100 INJECTION, SOLUTION INTRAVENOUS; SUBCUTANEOUS at 11:07

## 2022-03-07 RX ADMIN — PREDNISONE 30 MG: 20 TABLET ORAL at 08:34

## 2022-03-07 RX ADMIN — INSULIN LISPRO 1 UNITS: 100 INJECTION, SOLUTION INTRAVENOUS; SUBCUTANEOUS at 11:07

## 2022-03-07 RX ADMIN — INSULIN LISPRO 3 UNITS: 100 INJECTION, SOLUTION INTRAVENOUS; SUBCUTANEOUS at 07:44

## 2022-03-07 RX ADMIN — INSULIN LISPRO 2 UNITS: 100 INJECTION, SOLUTION INTRAVENOUS; SUBCUTANEOUS at 22:16

## 2022-03-07 RX ADMIN — Medication 3 MG: at 22:23

## 2022-03-07 NOTE — PROGRESS NOTES
Assessment and Plan      Current Problem List   Principal Problem:    Acute on chronic diastolic heart failure (HCC)  Active Problems:    Essential hypertension    Type 2 diabetes mellitus with hyperglycemia (HCC)    Morbid obesity (HCC)    VICKY (obstructive sleep apnea)    Moderate asthma with acute exacerbation    Stage 3a chronic kidney disease (HCC)    Paroxysmal atrial fibrillation (HCC)    Assessment/Plan:    1  Acute on chronic diastolic heart failure - unclaer etiology - LVIDd 6, NYHA III, ACCA HA stage C  Currently appears less volume overloaded - but still mildly elevated jvp  Asymptomatic  ·  Cont  Metoprolol  · Switch to PO bumex  · Place on telemetry  · Monitor I and O  · Daily weights  · Cont  Atorvastatin  · Salt and fluid restrict  · Will need continual follow up outpatient  2   PAF   ·  cont  eliquis + metprolol  ·       Subjective     CC: sob  HPI: 59-year-old male admitted with hypoxic respiratory failure, history of diastolic congestive heart failure, type 2 diabetes, hypertension, asthma, paroxysmal atrial fibrillation, chronic kidney disease, most recent echocardiogram EF of 55%, NYHA stage III, on a beta-blocker is also safe 50 mg daily, home diuretic 20 mg b i d , proximal atrial fibrillation on Eliquis, originally presented secondary to hypoxic respiratory failure originally started on Lasix 60 mg IV b i d  Cannot have adequate response switched to Bumex 2 mg IV b i d  Which he was sent to the weekends, creatinine improved with IV Bumex 2 mg b i d  Also was started on steroids as well for asthma exacerbation during this time  Creatinine this morning shows a slight elevation to 1 37 from 1 26 before baseline creatinine is approximately 1 2-1 6 he is net -5 0 4 L since admission and 1 6/20 4 hours his weight today is documented and accurately it appears to be 142 kg down from 143 kg yesterday, 1 month ago he was 145 kg      He denies any symptoms - states he feels well, able to lie flat, continuing to urinate well  Telemetry: not on telemetry  Net fluid balance:    I/O       03/05 0701 03/06 0700 03/06 0701 03/07 0700 03/07 0701 03/08 0700    P  O  1300 840     I V  (mL/kg) 480 (3 4)      Total Intake(mL/kg) 1780 (12 4) 840 (13)     Urine (mL/kg/hr) 3250 (0 9) 2500 (1 6)     Stool 0      Total Output 3250 2500     Net -1470 -1660            Unmeasured Stool Occurrence 2 x            Weight:   Weight (last 2 days)     Date/Time Weight    03/07/22 0600 64 6 (142 4)    03/06/22 0543 143 (314 82)    03/05/22 0600 143 (315 7)       Dry Weight:       ECHO: LVEF 11%, diastolic grade I      Stress test: none documented - ordered but not obtained  Cardiac Catheterization:  Never performed             Family History:   Historical Information   Past Medical History:   Diagnosis Date    Acute gastritis without hemorrhage     last assessed 3/24/17    Asthma     Diabetes mellitus ([facility] [address] )     Gastric bypass status for obesity     Hyperlipidemia     Hypertension     Impaired fasting glucose     last assessed 5/10/17    Obesity     Viral gastroenteritis     last assessed 11/4/16     Past Surgical History:   Procedure Laterality Date    CARPAL TUNNEL RELEASE      bilateral    GASTRIC BYPASS  2004    with han en y    HERNIA REPAIR      ventral inscisional    TONSILLECTOMY       Social History   Social History     Substance and Sexual Activity   Alcohol Use Yes    Alcohol/week: 1 0 standard drink    Types: 1 Standard drinks or equivalent per week    Comment: social     Social History     Substance and Sexual Activity   Drug Use No     Social History     Tobacco Use   Smoking Status Light Tobacco Smoker    Types: Cigars   Smokeless Tobacco Current User   Tobacco Comment    rare/hasn't smoked in 1 year     Family History:   Family History   Problem Relation Age of Onset    Stroke Mother     Hypertension Father        Review of Systems:  Review of Systems        Scheduled Meds:  Current Facility-Administered Medications   Medication Dose Route Frequency Provider Last Rate    acetaminophen  650 mg Oral Q6H PRN Kimberly Osei MD      albuterol  2 puff Inhalation Q4H PRN Kimberly Osei MD      albuterol  2 puff Inhalation 4x Daily Pavel Stone MD      aluminum-magnesium hydroxide-simethicone  30 mL Oral Q6H PRN Kimberly Osei MD      apixaban  5 mg Oral BID Kimberly Osei MD      atorvastatin  10 mg Oral Daily Kimberly Osei MD      budesonide-formoterol  2 puff Inhalation BID Kimberly Osei MD      docusate sodium  100 mg Oral BID Kimberly Osei MD      guaiFENesin  600 mg Oral BID Kimberly Osei MD      insulin glargine  14 Units Subcutaneous HS Pavel Stone MD      insulin lispro  1-6 Units Subcutaneous TID AC Kimberly Osei MD      insulin lispro  1-6 Units Subcutaneous HS Kimberly Osei MD      insulin lispro  3 Units Subcutaneous TID With Meals Pavel Stone MD      melatonin  3 mg Oral HS PRN Pavel Stone MD      metoprolol succinate  50 mg Oral Daily Scott Bass PA-C      montelukast  10 mg Oral HS Kimberly Osei MD      ondansetron  4 mg Intravenous Q6H PRN Kimberly Osei MD      oxyCODONE-acetaminophen  1 tablet Oral Q4H PRN iKmberly Osei MD      potassium chloride  20 mEq Oral BID Kimberly Osei MD      predniSONE  30 mg Oral Daily Pavel Stone MD      tiotropium  18 mcg Inhalation Daily Kimberly Osei MD       Continuous Infusions:   PRN Meds:   acetaminophen    albuterol    aluminum-magnesium hydroxide-simethicone    melatonin    ondansetron    oxyCODONE-acetaminophen  all current active meds have been reviewed, current meds:   Current Facility-Administered Medications   Medication Dose Route Frequency    acetaminophen (TYLENOL) tablet 650 mg  650 mg Oral Q6H PRN    albuterol (PROVENTIL HFA,VENTOLIN HFA) inhaler 2 puff  2 puff Inhalation Q4H PRN    albuterol (PROVENTIL HFA,VENTOLIN HFA) inhaler 2 puff  2 puff Inhalation 4x Daily    aluminum-magnesium hydroxide-simethicone (MYLANTA) oral suspension 30 mL  30 mL Oral Q6H PRN    apixaban (ELIQUIS) tablet 5 mg  5 mg Oral BID    atorvastatin (LIPITOR) tablet 10 mg  10 mg Oral Daily    budesonide-formoterol (SYMBICORT) 160-4 5 mcg/act inhaler 2 puff  2 puff Inhalation BID    docusate sodium (COLACE) capsule 100 mg  100 mg Oral BID    guaiFENesin (MUCINEX) 12 hr tablet 600 mg  600 mg Oral BID    insulin glargine (LANTUS) subcutaneous injection 14 Units 0 14 mL  14 Units Subcutaneous HS    insulin lispro (HumaLOG) 100 units/mL subcutaneous injection 1-6 Units  1-6 Units Subcutaneous TID AC    insulin lispro (HumaLOG) 100 units/mL subcutaneous injection 1-6 Units  1-6 Units Subcutaneous HS    insulin lispro (HumaLOG) 100 units/mL subcutaneous injection 3 Units  3 Units Subcutaneous TID With Meals    melatonin tablet 3 mg  3 mg Oral HS PRN    metoprolol succinate (TOPROL-XL) 24 hr tablet 50 mg  50 mg Oral Daily    montelukast (SINGULAIR) tablet 10 mg  10 mg Oral HS    ondansetron (ZOFRAN) injection 4 mg  4 mg Intravenous Q6H PRN    oxyCODONE-acetaminophen (PERCOCET) 5-325 mg per tablet 1 tablet  1 tablet Oral Q4H PRN    potassium chloride (K-DUR,KLOR-CON) CR tablet 20 mEq  20 mEq Oral BID    predniSONE tablet 30 mg  30 mg Oral Daily    tiotropium (SPIRIVA) capsule for inhaler 18 mcg  18 mcg Inhalation Daily    and PTA meds:   Prior to Admission Medications   Prescriptions Last Dose Informant Patient Reported? Taking?    Accu-Chek FastClix Lancets MISC  Self No No   Sig: Test blood sugar twice daily   Blood Glucose Monitoring Suppl (Accu-Chek Guide) w/Device KIT  Self No No   Sig: Use 2 (two) times a day Test blood sugar twice daily   MULTIPLE VITAMINS-CALCIUM PO  Self Yes No   Sig: Take 1 tablet by mouth daily   albuterol (PROVENTIL HFA,VENTOLIN HFA) 90 mcg/act inhaler   No No   Sig: Inhale 2 puffs every 4 (four) hours as needed for wheezing or shortness of breath apixaban (Eliquis) 5 mg   No No   Sig: Take 1 tablet (5 mg total) by mouth 2 (two) times a day   atorvastatin (LIPITOR) 10 mg tablet  Self No No   Sig: TAKE 1 TABLET BY MOUTH EVERY DAY   budesonide-formoterol (Symbicort) 160-4 5 mcg/act inhaler   No No   Sig: Inhale 2 puffs 2 (two) times a day Rinse mouth after use     glucose blood (Accu-Chek Guide) test strip  Self No No   Sig: Test blood sugar twice daily   guaiFENesin (MUCINEX) 600 mg 12 hr tablet  Self No No   Sig: Take 1 tablet (600 mg total) by mouth 2 (two) times a day   levalbuterol (Xopenex) 1 25 mg/3 mL nebulizer solution  Self No No   Sig: Take 3 mL (1 25 mg total) by nebulization 3 (three) times a day   metFORMIN (GLUCOPHAGE) 500 mg tablet   No No   Sig: Take 1 tablet (500 mg total) by mouth 2 (two) times a day After meals   metoprolol succinate (TOPROL-XL) 25 mg 24 hr tablet   No No   Sig: Take 2 tablets (50 mg total) by mouth daily   montelukast (SINGULAIR) 10 mg tablet   No No   Sig: Take 1 tablet (10 mg total) by mouth daily at bedtime   potassium chloride (K-DUR,KLOR-CON) 10 mEq tablet   No No   Sig: Take 2 tablets (20 mEq total) by mouth 2 (two) times a day   sitaGLIPtin (Januvia) 100 mg tablet   No No   Sig: Take 1 tablet (100 mg total) by mouth daily   sodium chloride (OCEAN) 0 65 % nasal spray  Self No No   Si spray into each nostril 3 (three) times a day   tiotropium (Spiriva Respimat) 1 25 MCG/ACT AERS inhaler   No No   Sig: Inhale 2 puffs daily   tobramycin (TOBREX) 0 3 % SOLN   No No   Sig: Administer 1 drop to both eyes every 4 (four) hours while awake   torsemide (DEMADEX) 20 mg tablet   No No   Sig: Take 1 tablet (20 mg total) by mouth 2 (two) times a day      Facility-Administered Medications: None       Allergies   Allergen Reactions    Azithromycin Other (See Comments)     Shaky, uneasy feeling     Bactrim [Sulfamethoxazole-Trimethoprim] Other (See Comments)     shakiness       Objective   Vitals: Temp (24hrs), Av °F (36 7 °C), Min:97 9 °F (36 6 °C), Max:98 1 °F (36 7 °C)  Current: Temperature: 97 9 °F (36 6 °C)  Patient Vitals for the past 24 hrs:   BP Temp Pulse Resp SpO2 Weight   03/07/22 0732 97/57 97 9 °F (36 6 °C) 77 18 94 % --   03/07/22 0600 -- -- -- -- -- 64 6 kg (142 lb 6 4 oz)   03/06/22 1537 112/55 98 1 °F (36 7 °C) 59 16 95 % --    Body mass index is 19 31 kg/m²  Orthostatic Blood Pressures      Most Recent Value   Blood Pressure 97/57 filed at 03/07/2022 0732   Patient Position - Orthostatic VS Lying filed at 03/05/2022 5736              Invasive Devices  Report    Peripheral Intravenous Line            Peripheral IV 03/04/22 Dorsal (posterior); Proximal;Right Forearm 3 days                Physical Exam:    General:  AO x3, no acute distress  Cardiac:  S1-S2 normal  No murmurs, rubs or gallops, JVP: mildly elevated  Lungs:  Clear to auscultation bilaterally, no wheezing or crackles  Abdomen:  Soft nontender nondistended  Extremities:  Mild non pitting edema  Neuro: Grossly nonfocal  Psych:  Normal affect      Lab Results:   Results from last 7 days   Lab Units 03/05/22  0536 03/04/22  0557 03/03/22  0521 03/03/22  0521 03/02/22  0530 03/02/22  0530 03/01/22  0615 03/01/22  0615   WBC Thousand/uL 8 10 8 55  --  9 29  --  9 47  --  12 31*   HEMOGLOBIN g/dL 10 2* 10 9*  --  10 3*  --  10 8*  --  12 4   HEMATOCRIT % 32 0* 32 8*  --  31 0*  --  33 7*  --  38 3   PLATELETS Thousands/uL 183 228  --  246  --  269  --  379   NEUTROS PCT %  --  85*   < > 88*   < > 82*   < > 75   MONOS PCT %  --  6  --  4  --  8  --  5    < > = values in this interval not displayed        Results from last 7 days   Lab Units 03/07/22  0539 03/06/22  0539 03/05/22  0536 03/04/22  0557 03/03/22  0521 03/02/22  0530 03/01/22  0636   SODIUM mmol/L 138 136 136 136 137 135* 141   POTASSIUM mmol/L 3 9 4 2 4 2 4 1 4 0 3 5 3 2*   CHLORIDE mmol/L 103 102 101 99* 100 99* 100   CO2 mmol/L 33* 30 31 31 31 30 33*   BUN mg/dL 28* 25 26* 24 27* 24 15 CREATININE mg/dL 1 37* 1 26 1 29 1 43* 1 44* 1 47* 1 41*   CALCIUM mg/dL 7 1* 7 2* 7 4* 7 1* 7 2* 7 2* 7 6*   ALK PHOS U/L  --   --   --   --   --   --  106   ALT U/L  --   --   --   --   --   --  13   AST U/L  --   --   --   --   --   --  12   MAGNESIUM mg/dL  --   --   --  2 1 2 0  --   --    EGFR ml/min/1 73sq m 58 64 62 55 54 53 56                 No results found for: PHART, YXZ0NZE, PO2ART, KWR0IDE, N9FXWNNE, BEART, SOURCE  No components found for: HIV1X2  Lab Results   Component Value Date    HEPCAB Non-reactive 04/23/2019     No results found for: SPEP, UPEP   Lab Results   Component Value Date    HGBA1C 6 3 (H) 03/07/2022    HGBA1C 7 0 (H) 11/10/2021    HGBA1C 7 1 (H) 07/18/2021     No results found for: CHOL   Lab Results   Component Value Date    HDL 66 10/01/2021    HDL 46 01/28/2020    HDL 40 04/23/2019      Lab Results   Component Value Date    LDLCALC 73 10/01/2021    LDLCALC 144 (H) 01/28/2020    LDLCALC 131 (H) 04/23/2019      Lab Results   Component Value Date    TRIG 90 10/01/2021    TRIG 126 01/28/2020    TRIG 126 04/23/2019     No components found for: PROCAL          Imaging: I have personally reviewed pertinent reports

## 2022-03-07 NOTE — UTILIZATION REVIEW
Continued Stay Review    Date: 03/06/2022                         Current Patient Class: Inpatient  Current Level of Care: Med Surg    HPI:56 y o  male initially admitted on  03/01/2022    Assessment/Plan: Pt reports SOB continues to improve  Cont IV Bumex BID  Potassium 4 2 this AM; continue on current potassium regimen  Cont to monitor I/O, daily weights  Cont top monitor Creatinine  Solu Medrol discontinue after am dose today  Continue home inhaler Symbicort, Spiriva, Singulair, Albuterol p r n    Cont Eliquis  Increase Lantus to 14 hs, add scheduled Humalog 3 TID  Continue SSI coverage  Vital Signs:   Date/Time Temp Pulse Resp BP MAP (mmHg) SpO2 O2 Device Patient Position - Orthostatic VS   03/07/22 15:20:13 97 8 °F (36 6 °C) 60 18 107/63 78 92 % -- --   03/07/22 12:47:15 -- 68 -- 111/63 79 94 % -- --   03/07/22 07:32:10 97 9 °F (36 6 °C) 77 18 97/57 70 94 % None (Room air) Lying   03/06/22 15:37:21 98 1 °F (36 7 °C) 59 16 112/55 74 95 % -- --   03/06/22 0800 -- -- -- -- -- -- None (Room air) --   03/06/22 07:29:22 98 1 °F (36 7 °C) 69 -- 104/52 69 97 % -- --   03/06/22 04:01:29 97 2 °F (36 2 °C) Abnormal  64 18 -- -- 97 % -- --       Pertinent Labs/Diagnostic Results:   Results from last 7 days   Lab Units 03/01/22  1128   SARS-COV-2  Negative     Results from last 7 days   Lab Units 03/05/22  0536 03/04/22  0557 03/03/22  0521 03/02/22  0530 03/02/22  0530 03/01/22  0615 03/01/22  0615   WBC Thousand/uL 8 10 8 55 9 29   < > 9 47   < > 12 31*   HEMOGLOBIN g/dL 10 2* 10 9* 10 3*  --  10 8*  --  12 4   HEMATOCRIT % 32 0* 32 8* 31 0*  --  33 7*  --  38 3   PLATELETS Thousands/uL 183 228 246   < > 269   < > 379   NEUTROS ABS Thousands/µL  --  7 23 8 10*  --  7 76*   < > 9 22*    < > = values in this interval not displayed           Results from last 7 days   Lab Units 03/07/22  0539 03/06/22  0539 03/05/22  0536 03/04/22  0557 03/03/22  0521   SODIUM mmol/L 138 136 136 136 137   POTASSIUM mmol/L 3 9 4 2 4 2 4 1 4 0 CHLORIDE mmol/L 103 102 101 99* 100   CO2 mmol/L 33* 30 31 31 31   ANION GAP mmol/L 2* 4 4 6 6   BUN mg/dL 28* 25 26* 24 27*   CREATININE mg/dL 1 37* 1 26 1 29 1 43* 1 44*   EGFR ml/min/1 73sq m 58 64 62 55 54   CALCIUM mg/dL 7 1* 7 2* 7 4* 7 1* 7 2*   MAGNESIUM mg/dL  --   --   --  2 1 2 0     Results from last 7 days   Lab Units 03/01/22  0636   AST U/L 12   ALT U/L 13   ALK PHOS U/L 106   TOTAL PROTEIN g/dL 7 4   ALBUMIN g/dL 2 7*   TOTAL BILIRUBIN mg/dL 0 80     Results from last 7 days   Lab Units 03/07/22  1537 03/07/22  1041 03/07/22  0631 03/06/22  2108 03/06/22  1536 03/06/22  1058 03/06/22  0630 03/05/22  2132 03/05/22  1812 03/05/22  1107 03/05/22  0627 03/04/22  1620   POC GLUCOSE mg/dl 232* 178* 116 115 311* 179* 171* 212* 390* 272* 217* 260*     Results from last 7 days   Lab Units 03/07/22  0539 03/06/22  0539 03/05/22  0536 03/04/22  0557 03/03/22  0521 03/02/22  0530 03/01/22  0636   GLUCOSE RANDOM mg/dL 105 149* 213* 237* 195* 171* 118         Results from last 7 days   Lab Units 03/07/22  0539   HEMOGLOBIN A1C % 6 3*   EAG mg/dl 134     Results from last 7 days   Lab Units 03/01/22  0640   PH OMERO  7 402*   PCO2 OMERO mm Hg 53 9*   PO2 OMERO mm Hg 67 5*   HCO3 OMERO mmol/L 32 8*   BASE EXC OMERO mmol/L 6 6   O2 CONTENT OMERO ml/dL 16 7   O2 HGB, VENOUS % 91 0*             Results from last 7 days   Lab Units 03/01/22  1128 03/01/22  0902 03/01/22  0637   HS TNI 0HR ng/L  --   --  18   HS TNI 2HR ng/L  --  16  --    HSTNI D2 ng/L  --  -2  --    HS TNI 4HR ng/L 16  --   --    HSTNI D4 ng/L -2  --   --        Results from last 7 days   Lab Units 03/02/22  0530 03/01/22  0636   PROCALCITONIN ng/ml 0 11 0 06       Results from last 7 days   Lab Units 03/01/22  0636   NT-PRO BNP pg/mL 805*       Results from last 7 days   Lab Units 03/01/22  1128   INFLUENZA A PCR  Negative   INFLUENZA B PCR  Negative   RSV PCR  Negative     Medications:   Scheduled Medications:  albuterol, 2 puff, Inhalation, 4x Daily  apixaban, 5 mg, Oral, BID  atorvastatin, 10 mg, Oral, Daily  budesonide-formoterol, 2 puff, Inhalation, BID  bumetanide, 2 mg, Oral, BID  docusate sodium, 100 mg, Oral, BID  guaiFENesin, 600 mg, Oral, BID  insulin glargine, 14 Units, Subcutaneous, HS  insulin lispro, 1-6 Units, Subcutaneous, TID AC  insulin lispro, 1-6 Units, Subcutaneous, HS  insulin lispro, 3 Units, Subcutaneous, TID With Meals  metoprolol succinate, 50 mg, Oral, Daily  montelukast, 10 mg, Oral, HS  potassium chloride, 20 mEq, Oral, BID  predniSONE, 30 mg, Oral, Daily  tiotropium, 18 mcg, Inhalation, Daily  methylPREDNISolone sodium succinate (Solu-MEDROL) injection 20 mg q12h IV   bumetanide (BUMEX) injection 2 mg IV BID    Continuous IV Infusions: none     PRN Meds:  acetaminophen, 650 mg, Oral, Q6H PRN  albuterol, 2 puff, Inhalation, Q4H PRN  aluminum-magnesium hydroxide-simethicone, 30 mL, Oral, Q6H PRN  melatonin, 3 mg, Oral, HS PRN 03/06 x 1  ondansetron, 4 mg, Intravenous, Q6H PRN  oxyCODONE-acetaminophen, 1 tablet, Oral, Q4H PRN        Discharge Plan: [facility]    Network Utilization Review Department  ATTENTION: Please call with any questions or concerns to [phone] and carefully listen to the prompts so that you are directed to the right person  All voicemails are confidential   Joseph Chahal all requests for admission clinical reviews, approved or denied determinations and any other requests to dedicated fax number below belonging to the campus where the patient is receiving treatment   List of dedicated fax numbers for the Facilities:  [address] [place] [address] DENIALS (Administrative/Medical Necessity) [phone]   [address] [place] [address] (Maternity/NICU/Pediatrics) 261 [facility] [address] [place] [address] [facility] [id number] [place] [address] [phone] [facility] 951 [phone]

## 2022-03-07 NOTE — CASE MANAGEMENT
Case Management Discharge Planning Note    Patient name Ben Carnes  Location 2 218/2 218-01 MRN [id number]  : 1965 Date 3/7/2022       Current Admission Date: 3/1/2022  Current Admission Diagnosis:Acute on chronic diastolic heart failure [facility])   Patient Active Problem List    Diagnosis Date Noted    [facility] 2022    Stage 3a chronic kidney disease ([facility] [address] ) 2021    Hypokalemia 2021    Acute on chronic diastolic heart failure ([facility] ) 2021    Moderate asthma with acute exacerbation 10/11/2021    Dyspnea on exertion 10/11/2021    Hyponatremia 2021    Cardiomyopathy ([facility] ) 2021    Well adult exam 2021    Decreased left ventricular systolic function 5763    Bilateral leg edema 2020    Edema of both feet 2020    VICKY (obstructive sleep apnea)     Daytime sleepiness 2019    Mixed hyperlipidemia 2019    Morbid obesity ([facility] ) 2019    Vitamin B12 deficiency 2019    Vitamin D deficiency 2019    Knee sprain, bilateral 2018    Primary osteoarthritis of both knees 2018    Irritable bowel syndrome with diarrhea 2018    Essential hypertension 2018    Type 2 diabetes mellitus with hyperglycemia ([facility] ) 2018      LOS (days): 5  Geometric Mean LOS (GMLOS) (days): 3 80  Days to GMLOS:-1 3     OBJECTIVE:  Risk of Unplanned Readmission Score: 37         Current admission status: Inpatient   Preferred Pharmacy:   [address], [address]  [address] [address]  [address] [facility] [address]  Phone: [phone] Fax: [phone]    Primary Care Provider: Ricky Marquez MD    Primary Insurance: Hamida Jackson  Secondary Insurance:     DISCHARGE DETAILS:    CM continues to follow this pt; Per provider, pt is not yet medically stable for d/c  Pt is switching to PO bumex and continuing on metoprolol and tele   At this time, no CM needs have been identified  Pt is IPTA, no rehab needs noted  CM will continue to follow for dispo planning needs

## 2022-03-08 LAB
ANION GAP SERPL CALCULATED.3IONS-SCNC: 6 MMOL/L (ref 4–13)
BUN SERPL-MCNC: 28 MG/DL (ref 5–25)
CALCIUM SERPL-MCNC: 7.3 MG/DL (ref 8.3–10.1)
CHLORIDE SERPL-SCNC: 102 MMOL/L (ref 100–108)
CO2 SERPL-SCNC: 30 MMOL/L (ref 21–32)
CREAT SERPL-MCNC: 1.36 MG/DL (ref 0.6–1.3)
ERYTHROCYTE [DISTWIDTH] IN BLOOD BY AUTOMATED COUNT: 15.3 % (ref 11.6–15.1)
GFR SERPL CREATININE-BSD FRML MDRD: 58 ML/MIN/1.73SQ M
GLUCOSE SERPL-MCNC: 129 MG/DL (ref 65–140)
GLUCOSE SERPL-MCNC: 133 MG/DL (ref 65–140)
GLUCOSE SERPL-MCNC: 190 MG/DL (ref 65–140)
GLUCOSE SERPL-MCNC: 211 MG/DL (ref 65–140)
GLUCOSE SERPL-MCNC: 214 MG/DL (ref 65–140)
HCT VFR BLD AUTO: 33.7 % (ref 36.5–49.3)
HGB BLD-MCNC: 11.2 G/DL (ref 12–17)
MCH RBC QN AUTO: 30.2 PG (ref 26.8–34.3)
MCHC RBC AUTO-ENTMCNC: 33.2 G/DL (ref 31.4–37.4)
MCV RBC AUTO: 91 FL (ref 82–98)
PLATELET # BLD AUTO: 211 THOUSANDS/UL (ref 149–390)
PMV BLD AUTO: 11.3 FL (ref 8.9–12.7)
POTASSIUM SERPL-SCNC: 3.6 MMOL/L (ref 3.5–5.3)
RBC # BLD AUTO: 3.71 MILLION/UL (ref 3.88–5.62)
SODIUM SERPL-SCNC: 138 MMOL/L (ref 136–145)
WBC # BLD AUTO: 9.68 THOUSAND/UL (ref 4.31–10.16)

## 2022-03-08 PROCEDURE — 99232 SBSQ HOSP IP/OBS MODERATE 35: CPT | Performed by: PHYSICIAN ASSISTANT

## 2022-03-08 PROCEDURE — 4A023N6 MEASUREMENT OF CARDIAC SAMPLING AND PRESSURE, RIGHT HEART, PERCUTANEOUS APPROACH: ICD-10-PCS | Performed by: INTERNAL MEDICINE

## 2022-03-08 PROCEDURE — 85027 COMPLETE CBC AUTOMATED: CPT | Performed by: INTERNAL MEDICINE

## 2022-03-08 PROCEDURE — B31S1ZZ FLUOROSCOPY OF RIGHT PULMONARY ARTERY USING LOW OSMOLAR CONTRAST: ICD-10-PCS | Performed by: INTERNAL MEDICINE

## 2022-03-08 PROCEDURE — 80048 BASIC METABOLIC PNL TOTAL CA: CPT | Performed by: INTERNAL MEDICINE

## 2022-03-08 PROCEDURE — 02HR30Z INSERTION OF PRESSURE SENSOR MONITORING DEVICE INTO LEFT PULMONARY ARTERY, PERCUTANEOUS APPROACH: ICD-10-PCS | Performed by: INTERNAL MEDICINE

## 2022-03-08 PROCEDURE — 4A033B3 MEASUREMENT OF ARTERIAL PRESSURE, PULMONARY, PERCUTANEOUS APPROACH: ICD-10-PCS | Performed by: INTERNAL MEDICINE

## 2022-03-08 PROCEDURE — 82948 REAGENT STRIP/BLOOD GLUCOSE: CPT

## 2022-03-08 PROCEDURE — 99232 SBSQ HOSP IP/OBS MODERATE 35: CPT | Performed by: INTERNAL MEDICINE

## 2022-03-08 RX ORDER — BUMETANIDE 1 MG/1
1 TABLET ORAL 2 TIMES DAILY
Status: DISCONTINUED | OUTPATIENT
Start: 2022-03-08 | End: 2022-03-09

## 2022-03-08 RX ADMIN — POTASSIUM CHLORIDE 20 MEQ: 1500 TABLET, EXTENDED RELEASE ORAL at 08:02

## 2022-03-08 RX ADMIN — INSULIN LISPRO 2 UNITS: 100 INJECTION, SOLUTION INTRAVENOUS; SUBCUTANEOUS at 10:57

## 2022-03-08 RX ADMIN — ALBUTEROL SULFATE 2 PUFF: 90 AEROSOL, METERED RESPIRATORY (INHALATION) at 17:38

## 2022-03-08 RX ADMIN — POTASSIUM CHLORIDE 20 MEQ: 1500 TABLET, EXTENDED RELEASE ORAL at 17:39

## 2022-03-08 RX ADMIN — DOCUSATE SODIUM 100 MG: 100 CAPSULE ORAL at 17:39

## 2022-03-08 RX ADMIN — METOPROLOL SUCCINATE 50 MG: 50 TABLET, EXTENDED RELEASE ORAL at 08:02

## 2022-03-08 RX ADMIN — PREDNISONE 30 MG: 20 TABLET ORAL at 08:02

## 2022-03-08 RX ADMIN — BUDESONIDE AND FORMOTEROL FUMARATE DIHYDRATE 2 PUFF: 160; 4.5 AEROSOL RESPIRATORY (INHALATION) at 08:02

## 2022-03-08 RX ADMIN — BUMETANIDE 2 MG: 2 TABLET ORAL at 08:02

## 2022-03-08 RX ADMIN — GUAIFENESIN 600 MG: 600 TABLET, EXTENDED RELEASE ORAL at 08:02

## 2022-03-08 RX ADMIN — TIOTROPIUM BROMIDE 18 MCG: 18 CAPSULE ORAL; RESPIRATORY (INHALATION) at 10:57

## 2022-03-08 RX ADMIN — BUMETANIDE 1 MG: 1 TABLET ORAL at 17:39

## 2022-03-08 RX ADMIN — INSULIN GLARGINE 14 UNITS: 100 INJECTION, SOLUTION SUBCUTANEOUS at 21:27

## 2022-03-08 RX ADMIN — INSULIN LISPRO 2 UNITS: 100 INJECTION, SOLUTION INTRAVENOUS; SUBCUTANEOUS at 21:27

## 2022-03-08 RX ADMIN — INSULIN LISPRO 3 UNITS: 100 INJECTION, SOLUTION INTRAVENOUS; SUBCUTANEOUS at 16:42

## 2022-03-08 RX ADMIN — DOCUSATE SODIUM 100 MG: 100 CAPSULE ORAL at 08:02

## 2022-03-08 RX ADMIN — APIXABAN 5 MG: 5 TABLET, FILM COATED ORAL at 08:01

## 2022-03-08 RX ADMIN — Medication 3 MG: at 21:27

## 2022-03-08 RX ADMIN — ALBUTEROL SULFATE 2 PUFF: 90 AEROSOL, METERED RESPIRATORY (INHALATION) at 08:01

## 2022-03-08 RX ADMIN — INSULIN LISPRO 2 UNITS: 100 INJECTION, SOLUTION INTRAVENOUS; SUBCUTANEOUS at 16:41

## 2022-03-08 RX ADMIN — ALBUTEROL SULFATE 2 PUFF: 90 AEROSOL, METERED RESPIRATORY (INHALATION) at 11:09

## 2022-03-08 RX ADMIN — INSULIN LISPRO 3 UNITS: 100 INJECTION, SOLUTION INTRAVENOUS; SUBCUTANEOUS at 11:09

## 2022-03-08 RX ADMIN — ATORVASTATIN CALCIUM 10 MG: 10 TABLET, FILM COATED ORAL at 08:01

## 2022-03-08 RX ADMIN — INSULIN LISPRO 3 UNITS: 100 INJECTION, SOLUTION INTRAVENOUS; SUBCUTANEOUS at 07:56

## 2022-03-08 RX ADMIN — BUDESONIDE AND FORMOTEROL FUMARATE DIHYDRATE 2 PUFF: 160; 4.5 AEROSOL RESPIRATORY (INHALATION) at 17:39

## 2022-03-08 RX ADMIN — MONTELUKAST 10 MG: 10 TABLET, FILM COATED ORAL at 21:27

## 2022-03-08 RX ADMIN — ALBUTEROL SULFATE 2 PUFF: 90 AEROSOL, METERED RESPIRATORY (INHALATION) at 21:26

## 2022-03-08 RX ADMIN — GUAIFENESIN 600 MG: 600 TABLET, EXTENDED RELEASE ORAL at 17:39

## 2022-03-08 NOTE — ASSESSMENT & PLAN NOTE
Wt Readings from Last 3 Encounters:   03/07/22 64 6 kg (142 lb 6 4 oz)   02/22/22 (!) 143 kg (315 lb 1 6 oz)   02/12/22 (!) 145 kg (319 lb)     · Multiple recent admissions with CHF exacerbation  · Patient on outpatient torsemide 40 mg b i d  which was increased recently  · Follows with Dr Asim Hoffmann outpatient  · Not on home oxygen at baseline   · BNP elevated 805, CXR unremarkable  · Diuresis changed from Lasix to Bumex 2 mg IV BID on 3/4 with clinical improvement  · Changed to PO Bumex 2 mg BID  · Cardiology input appreciated    Plan for cardioMEMs tomorrow   · Monitor I/O, daily weights

## 2022-03-08 NOTE — PROGRESS NOTES
[facility]  Progress Note - Sandre Printers 1965, 49 y o  male MRN: [id number]  Unit/Bed#: CW2 218-01 Encounter: [id number]  Primary Care Provider: Antonella Skelton MD   Date and time admitted to hospital: 3/1/2022  6:05 AM    * Acute on chronic diastolic heart failure [facility])  Assessment & Plan  Wt Readings from Last 3 Encounters:   03/07/22 64 6 kg (142 lb 6 4 oz)   02/22/22 (!) 143 kg (315 lb 1 6 oz)   02/12/22 (!) 145 kg (319 lb)     · Multiple recent admissions with CHF exacerbation  · Patient on outpatient torsemide 40 mg b i d  which was increased recently  · Follows with Dr Cholo Dumont outpatient  · Not on home oxygen at baseline   · BNP elevated 805, CXR unremarkable  · Diuresis changed from Lasix to Bumex 2 mg IV BID on 3/4 with clinical improvement  · Changed to PO Bumex 2 mg BID today  · Cardiology input appreciated  · Monitor I/O, daily weights    Moderate asthma with acute exacerbation  Assessment & Plan  · Patient with history of moderate asthma presented to ER with complaint of increasing shortness of breath, hypoxia, diffuse wheezing  · Prednisone begun this am - taper  · Continue home inhaler Symbicort, Spiriva, Singulair, Albuterol p r n    · Respiratory protocol on board     Paroxysmal atrial fibrillation ([facility] [address] )  Assessment & Plan  · New diagnosis in 2/2022  · NSR at present  · Ct BB, Eliquis      Stage 3a chronic kidney disease [facility])  Assessment & Plan  Lab Results   Component Value Date    EGFR 58 03/07/2022    EGFR 64 03/06/2022    EGFR 62 03/05/2022    CREATININE 1 37 (H) 03/07/2022    CREATININE 1 26 03/06/2022    CREATININE 1 29 03/05/2022   · Baseline Cr appears to be between 1 3-1 7  · Cr stable at baseline  · Avoid nephrotoxins    VICKY (obstructive sleep apnea)  Assessment & Plan  · Has CPAP machine at home but not using it since a few months as it has been recalled  · Declines using hospital CPAP  · Awaiting new CPAP at home    Morbid obesity [facility])  Assessment & Plan  Body mass index is 19 31 kg/m²  · Lifestyle modification    Type 2 diabetes mellitus with hyperglycemia [facility])  Assessment & Plan  Lab Results   Component Value Date    HGBA1C 6 3 (H) 2022       Recent Labs     22  2108 22  0631 22  1041 22  1537   POCGLU 115 116 178* 232*       Blood Sugar Average: Last 72 hrs:  (P) 220 [id number]   · Hold home oral meds while inpatient  · Steroid induced hyperglycemia  · Ct Lantus 14 hs, Humalog 3 TID, SSI  · Diabetic diet  · Hypoglycemia protocol  · Monitor blood sugars and adjust insulin as needed - decrease dose as steroid tapered    Essential hypertension  Assessment & Plan  · Continue home medication metoprolol,   · Bumex as above  · BP acceptable    VTE Pharmacologic Prophylaxis: VTE Score: 6 High Risk (Score >/= 5) - Pharmacological DVT Prophylaxis Ordered: apixaban (Eliquis)  Sequential Compression Devices Ordered  Patient Centered Rounds: Discussed with RN    Education and Discussions with Family / Patient: Attempted to update  (wife) via phone  Left voicemail  Time Spent for Care: 20 minutes  More than 50% of total time spent on counseling and coordination of care as described above  Current Length of Stay: 5 day(s)  Current Patient Status: Inpatient   Certification Statement: The patient will continue to require additional inpatient hospital stay due to acute on chronic CHF    Code Status: Level 1 - Full Code    Subjective:   Shortness of breath better    Objective:     Vitals:   Temp (24hrs), Av 9 °F (36 6 °C), Min:97 8 °F (36 6 °C), Max:97 9 °F (36 6 °C)    Temp:  [97 8 °F (36 6 °C)-97 9 °F (36 6 °C)] 97 8 °F (36 6 °C)  HR:  [60-91] 91  Resp:  [18] 18  BP: ()/(57-67) 116/67  SpO2:  [92 %-94 %] 94 %  Body mass index is 19 31 kg/m²  Physical Exam:   Physical Exam  Vitals reviewed  HENT:      Head: Normocephalic        Nose: Nose normal       Mouth/Throat:      Mouth: Mucous membranes are moist    Eyes:      Extraocular Movements: Extraocular movements intact  Cardiovascular:      Rate and Rhythm: Normal rate and regular rhythm  Pulmonary:      Effort: Pulmonary effort is normal  No respiratory distress  Breath sounds: Normal breath sounds  No wheezing  Abdominal:      General: Bowel sounds are normal  There is no distension  Palpations: Abdomen is soft  Tenderness: There is no abdominal tenderness  Musculoskeletal:         General: No swelling  Cervical back: Neck supple  Skin:     General: Skin is warm and dry  Neurological:      General: No focal deficit present  Mental Status: He is alert and oriented to person, place, and time  Psychiatric:         Mood and Affect: Mood normal          Behavior: Behavior normal           Additional Data:     Labs:  Results from last 7 days   Lab Units 03/05/22  0536 03/04/22  0557 03/04/22  0557   WBC Thousand/uL 8 10   < > 8 55   HEMOGLOBIN g/dL 10 2*   < > 10 9*   HEMATOCRIT % 32 0*   < > 32 8*   PLATELETS Thousands/uL 183   < > 228   NEUTROS PCT %  --   --  85*   LYMPHS PCT %  --   --  8*   MONOS PCT %  --   --  6   EOS PCT %  --   --  0    < > = values in this interval not displayed  Results from last 7 days   Lab Units 03/07/22  0539 03/02/22  0530 03/01/22  0636   SODIUM mmol/L 138   < > 141   POTASSIUM mmol/L 3 9   < > 3 2*   CHLORIDE mmol/L 103   < > 100   CO2 mmol/L 33*   < > 33*   BUN mg/dL 28*   < > 15   CREATININE mg/dL 1 37*   < > 1 41*   ANION GAP mmol/L 2*   < > 8   CALCIUM mg/dL 7 1*   < > 7 6*   ALBUMIN g/dL  --   --  2 7*   TOTAL BILIRUBIN mg/dL  --   --  0 80   ALK PHOS U/L  --   --  106   ALT U/L  --   --  13   AST U/L  --   --  12   GLUCOSE RANDOM mg/dL 105   < > 118    < > = values in this interval not displayed           Results from last 7 days   Lab Units 03/07/22  1537 03/07/22  1041 03/07/22  0631 03/06/22  2108 03/06/22  1536 03/06/22  1058 03/06/22  0630 03/05/22 2132 03/05/22  1812 03/05/22  1107 03/05/22  0627 03/04/22  1620   POC GLUCOSE mg/dl 232* 178* 116 115 311* 179* 171* 212* 390* 272* 217* 260*     Results from last 7 days   Lab Units 03/07/22  0539   HEMOGLOBIN A1C % 6 3*     Results from last 7 days   Lab Units 03/02/22  0530 03/01/22  0636   PROCALCITONIN ng/ml 0 11 0 06       Lines/Drains:  Invasive Devices  Report    Peripheral Intravenous Line            Peripheral IV 03/04/22 Dorsal (posterior); Proximal;Right Forearm 3 days                Last 24 Hours Medication List:   Current Facility-Administered Medications   Medication Dose Route Frequency Provider Last Rate    acetaminophen  650 mg Oral Q6H PRN Joaquin Hunt MD      albuterol  2 puff Inhalation Q4H PRN Joaquin Hunt MD      albuterol  2 puff Inhalation 4x Daily Juan Warren MD      aluminum-magnesium hydroxide-simethicone  30 mL Oral Q6H PRN Joaquin Hunt MD      apixaban  5 mg Oral BID Joaquin Hunt MD      atorvastatin  10 mg Oral Daily Joaquin Hunt MD      budesonide-formoterol  2 puff Inhalation BID Joaquin Hunt MD      bumetanide  2 mg Oral BID Ruthanna SermonDO kelsi      docusate sodium  100 mg Oral BID Joaquin Hunt MD      guaiFENesin  600 mg Oral BID Joaquin Hunt MD      insulin glargine  14 Units Subcutaneous HS Juan Warren MD      insulin lispro  1-6 Units Subcutaneous TID AC Joaquin Hunt MD      insulin lispro  1-6 Units Subcutaneous HS Joaquin Hunt MD      insulin lispro  3 Units Subcutaneous TID With Meals Juan Warren MD      melatonin  3 mg Oral HS PRN Juan Warren MD      metoprolol succinate  50 mg Oral Daily Aster García PA-C      montelukast  10 mg Oral HS Devang Ingram MD      ondansetron  4 mg Intravenous Q6H PRN Joaquin Hunt MD      oxyCODONE-acetaminophen  1 tablet Oral Q4H PRN Joaquin Hunt MD      potassium chloride  20 mEq Oral BID Joaquin Hunt MD      [START ON 3/11/2022] predniSONE  10 mg Oral Daily MD Oneal Young ON 3/9/2022] predniSONE  20 mg Oral Daily MD Oneal Schneiderjud Farley ON 3/8/2022] predniSONE  30 mg Oral Daily Rocío Reynolds MD      tiotropium  18 mcg Inhalation Daily Cristo Viramontes MD          Today, Patient Was Seen By: Rocío Reynolds MD    **Please Note: This note may have been constructed using a voice recognition system  **

## 2022-03-08 NOTE — ASSESSMENT & PLAN NOTE
Lab Results   Component Value Date    EGFR 58 03/07/2022    EGFR 64 03/06/2022    EGFR 62 03/05/2022    CREATININE 1 37 (H) 03/07/2022    CREATININE 1 26 03/06/2022    CREATININE 1 29 03/05/2022   · Baseline Cr appears to be between 1 3-1 7  · Cr stable at baseline  · Avoid nephrotoxins

## 2022-03-08 NOTE — ASSESSMENT & PLAN NOTE
Lab Results   Component Value Date    HGBA1C 6 3 (H) 03/07/2022       Recent Labs     03/07/22  1537 03/07/22  2054 03/08/22  0713 03/08/22  1030   POCGLU 232* 227* 129 211*       Blood Sugar Average: Last 72 hrs:  (P) 211 [id number]   · Hold home oral meds while inpatient  · Steroid induced hyperglycemia  · Ct Lantus 14 hs, Humalog 3 TID, SSI  · Diabetic diet  · Hypoglycemia protocol  · Monitor blood sugars and adjust insulin as needed - decrease dose as steroid tapered

## 2022-03-08 NOTE — ASSESSMENT & PLAN NOTE
· Patient with history of moderate asthma presented to ER with complaint of increasing shortness of breath, hypoxia, diffuse wheezing  · Prednisone begun this am - taper  · Continue home inhaler Symbicort, Spiriva, Singulair, Albuterol p r n    · Respiratory protocol on board

## 2022-03-08 NOTE — ASSESSMENT & PLAN NOTE
Wt Readings from Last 3 Encounters:   03/07/22 64 6 kg (142 lb 6 4 oz)   02/22/22 (!) 143 kg (315 lb 1 6 oz)   02/12/22 (!) 145 kg (319 lb)     · Multiple recent admissions with CHF exacerbation  · Patient on outpatient torsemide 40 mg b i d  which was increased recently  · Follows with Dr Ladarius Townsend outpatient    · Not on home oxygen at baseline   · BNP elevated 805, CXR unremarkable  · Diuresis changed from Lasix to Bumex 2 mg IV BID on 3/4 with clinical improvement  · Changed to PO Bumex 2 mg BID today  · Cardiology input appreciated  · Monitor I/O, daily weights

## 2022-03-08 NOTE — ASSESSMENT & PLAN NOTE
Lab Results   Component Value Date    HGBA1C 6 3 (H) 03/07/2022       Recent Labs     03/06/22  2108 03/07/22  0631 03/07/22  1041 03/07/22  1537   POCGLU 115 116 178* 232*       Blood Sugar Average: Last 72 hrs:  (P) 220 [id number]   · Hold home oral meds while inpatient  · Steroid induced hyperglycemia  · Ct Lantus 14 hs, Humalog 3 TID, SSI  · Diabetic diet  · Hypoglycemia protocol  · Monitor blood sugars and adjust insulin as needed - decrease dose as steroid tapered

## 2022-03-08 NOTE — PROGRESS NOTES
[facility]  Progress Note - Tanner Captain 1965, 49 y o  male MRN: [id number]  Unit/Bed#: CW2 218-01 Encounter: [id number]  Primary Care Provider: Gaynelle Sever, MD   Date and time admitted to hospital: 3/1/2022  6:05 AM    * Acute on chronic diastolic heart failure [facility])  Assessment & Plan  Wt Readings from Last 3 Encounters:   03/07/22 64 6 kg (142 lb 6 4 oz)   02/22/22 (!) 143 kg (315 lb 1 6 oz)   02/12/22 (!) 145 kg (319 lb)     · Multiple recent admissions with CHF exacerbation  · Patient on outpatient torsemide 40 mg b i d  which was increased recently  · Follows with Dr Chapincito Esquivel outpatient  · Not on home oxygen at baseline   · BNP elevated 805, CXR unremarkable  · Diuresis changed from Lasix to Bumex 2 mg IV BID on 3/4 with clinical improvement  · Changed to PO Bumex 2 mg BID  · Cardiology input appreciated  Plan for cardioMEMs tomorrow   · Monitor I/O, daily weights    Paroxysmal atrial fibrillation (HCC)  Assessment & Plan  · New diagnosis in 2/2022  · NSR at present  · Ct BB, Eliquis      Stage 3a chronic kidney disease [facility])  Assessment & Plan  Lab Results   Component Value Date    EGFR 58 03/08/2022    EGFR 58 03/07/2022    EGFR 64 03/06/2022    CREATININE 1 36 (H) 03/08/2022    CREATININE 1 37 (H) 03/07/2022    CREATININE 1 26 03/06/2022   · Baseline Cr appears to be between 1 3-1 7  · Cr stable at baseline  · Avoid nephrotoxins    Moderate asthma with acute exacerbation  Assessment & Plan  · Patient with history of moderate asthma presented to ER with complaint of increasing shortness of breath, hypoxia, diffuse wheezing  · Prednisone begun this am - taper  · Continue home inhaler Symbicort, Spiriva, Singulair, Albuterol p r n    · Respiratory protocol on board     VICKY (obstructive sleep apnea)  Assessment & Plan  · Has CPAP machine at home but not using it since a few months as it has been recalled  · Declines using hospital CPAP  · Awaiting new CPAP at home    Morbid obesity [facility])  Assessment & Plan  Body mass index is 19 31 kg/m²  · Lifestyle modification    Type 2 diabetes mellitus with hyperglycemia [facility])  Assessment & Plan  Lab Results   Component Value Date    HGBA1C 6 3 (H) 2022       Recent Labs     22  1537 22  2054 22  0713 22  1030   POCGLU 232* 227* 129 211*       Blood Sugar Average: Last 72 hrs:  (P) 211 [id number]   · Hold home oral meds while inpatient  · Steroid induced hyperglycemia  · Ct Lantus 14 hs, Humalog 3 TID, SSI  · Diabetic diet  · Hypoglycemia protocol  · Monitor blood sugars and adjust insulin as needed - decrease dose as steroid tapered    Essential hypertension  Assessment & Plan  · Continue home medication metoprolol,   · Bumex as above  · BP acceptable          VTE Pharmacologic Prophylaxis: VTE Score: 6 High Risk (Score >/= 5) - Pharmacological DVT Prophylaxis Ordered: apixaban (Eliquis)  Sequential Compression Devices Ordered  Patient Centered Rounds: I performed bedside rounds with nursing staff today  Discussions with Specialists or Other Care Team Provider: RN    Education and Discussions with Family / Patient: Patient declined call to   Time Spent for Care: 20 minutes  More than 50% of total time spent on counseling and coordination of care as described above  Current Length of Stay: 6 day(s)  Current Patient Status: Inpatient   Certification Statement: The patient will continue to require additional inpatient hospital stay due to cardioMEMs tomorrow  Discharge Plan: Anticipate discharge in 48-72 hrs to home      Code Status: Level 1 - Full Code    Subjective:   Pt reports he feels well today    Objective:     Vitals:   Temp (24hrs), Av 8 °F (36 6 °C), Min:97 8 °F (36 6 °C), Max:97 8 °F (36 6 °C)    Temp:  [97 8 °F (36 6 °C)] 97 8 °F (36 6 °C)  HR:  [60-91] 61  Resp:  [18] 18  BP: (107-128)/(56-67) 128/63  SpO2:  [92 %-95 %] 95 %  Body mass index is 19 31 kg/m²  Input and Output Summary (last 24 hours): Intake/Output Summary (Last 24 hours) at 3/8/2022 1213  Last data filed at 3/8/2022 1135  Gross per 24 hour   Intake 360 ml   Output 2250 ml   Net -1890 ml       Physical Exam:   Physical Exam  Vitals reviewed  Constitutional:       General: He is not in acute distress  Appearance: He is not toxic-appearing  HENT:      Head: Normocephalic and atraumatic  Eyes:      Extraocular Movements: Extraocular movements intact  Cardiovascular:      Rate and Rhythm: Normal rate and regular rhythm  Pulmonary:      Effort: Pulmonary effort is normal       Breath sounds: Normal breath sounds  Abdominal:      General: Bowel sounds are normal  There is no distension  Palpations: Abdomen is soft  Tenderness: There is no abdominal tenderness  Musculoskeletal:         General: Normal range of motion  Cervical back: Normal range of motion  Neurological:      General: No focal deficit present  Mental Status: He is alert and oriented to person, place, and time  Psychiatric:         Mood and Affect: Mood normal          Behavior: Behavior normal          Thought Content: Thought content normal           Additional Data:     Labs:  Results from last 7 days   Lab Units 03/08/22  0611 03/05/22  0536 03/04/22  0557   WBC Thousand/uL 9 68   < > 8 55   HEMOGLOBIN g/dL 11 2*   < > 10 9*   HEMATOCRIT % 33 7*   < > 32 8*   PLATELETS Thousands/uL 211   < > 228   NEUTROS PCT %  --   --  85*   LYMPHS PCT %  --   --  8*   MONOS PCT %  --   --  6   EOS PCT %  --   --  0    < > = values in this interval not displayed       Results from last 7 days   Lab Units 03/08/22  0611   SODIUM mmol/L 138   POTASSIUM mmol/L 3 6   CHLORIDE mmol/L 102   CO2 mmol/L 30   BUN mg/dL 28*   CREATININE mg/dL 1 36*   ANION GAP mmol/L 6   CALCIUM mg/dL 7 3*   GLUCOSE RANDOM mg/dL 133         Results from last 7 days   Lab Units 03/08/22  1030 03/08/22  0713 03/07/22 2054 03/07/22  1537 03/07/22  1041 03/07/22  0631 03/06/22  2108 03/06/22  1536 03/06/22  1058 03/06/22  0630 03/05/22  2132 03/05/22  1812   POC GLUCOSE mg/dl 211* 129 227* 232* 178* 116 115 311* 179* 171* 212* 390*     Results from last 7 days   Lab Units 03/07/22  0539   HEMOGLOBIN A1C % 6 3*     Results from last 7 days   Lab Units 03/02/22  0530   PROCALCITONIN ng/ml 0 11       Lines/Drains:  Invasive Devices  Report    Peripheral Intravenous Line            Peripheral IV 03/04/22 Dorsal (posterior); Proximal;Right Forearm 4 days                      Imaging: No pertinent imaging reviewed      Recent Cultures (last 7 days):         Last 24 Hours Medication List:   Current Facility-Administered Medications   Medication Dose Route Frequency Provider Last Rate    acetaminophen  650 mg Oral Q6H PRN Yoel Neumann MD      albuterol  2 puff Inhalation Q4H PRN Yoel Neumann MD      albuterol  2 puff Inhalation 4x Daily Will MD Tamie      aluminum-magnesium hydroxide-simethicone  30 mL Oral Q6H PRN Yoel Neumann MD      apixaban  5 mg Oral BID Yoel Neumann MD      atorvastatin  10 mg Oral Daily Yoel Neumann MD      budesonide-formoterol  2 puff Inhalation BID Yoel Neumann MD      bumetanide  2 mg Oral BID Carissa Grant DO      docusate sodium  100 mg Oral BID Yoel Neumann MD      guaiFENesin  600 mg Oral BID Yoel Neumann MD      insulin glargine  14 Units Subcutaneous HS Will MD Tamie      insulin lispro  1-6 Units Subcutaneous TID AC Yoel Neumann MD      insulin lispro  1-6 Units Subcutaneous HS Yoel Neumann MD      insulin lispro  3 Units Subcutaneous TID With Meals Will MD Tamie      melatonin  3 mg Oral HS PRN Will MD Tamie      metoprolol succinate  50 mg Oral Daily Aster García PA-C      montelukast  10 mg Oral HS Devang Ingram MD      ondansetron  4 mg Intravenous Q6H PRN Yoel Neumann MD      oxyCODONE-acetaminophen  1 tablet Oral Q4H PRN MD Jairo Diallo Sa potassium chloride  20 mEq Oral BID Kimberly Osei MD      [START ON 3/11/2022] predniSONE  10 mg Oral Daily MD Oneal Martinezvictor m Ambreen ON 3/9/2022] predniSONE  20 mg Oral Daily Pavel Stone MD      tiotropium  18 mcg Inhalation Daily Kimberly Osei MD          Today, Patient Was Seen By: Marilyn Cisneros PA-C    **Please Note: This note may have been constructed using a voice recognition system  **

## 2022-03-08 NOTE — PROGRESS NOTES
Heart Failure Progress note  Unit/Bed#: CW2 218-01 Encounter: [id number]        David Petersen 49 y o  male [id number]  Hospital Stay Days: 6    Assessment and Plan      Current Problem List   Principal Problem:    Acute on chronic diastolic heart failure [facility])  Active Problems:    Essential hypertension    Type 2 diabetes mellitus with hyperglycemia (HCC)    Morbid obesity (HCC)    VICKY (obstructive sleep apnea)    Moderate asthma with acute exacerbation    Stage 3a chronic kidney disease (HCC)    Paroxysmal atrial fibrillation (HCC)    Assessment/Plan:    1  Acute on chronic diastolic CHF - likely secondary to asthma + VICKY superimposed CKD stage 3 and PAF  ACC/AHA stage C  NYHA class III  Home regimen includes torsemide 20 mg BID  Weight today is 140 kg down from 142 kg yesterday  Also on metoprolol  Frequent exacerbations secondary to asthma  Superimposed asthma exacerbation this admission  · Continue Metoprolol  · Continue PO Bumex currently - consideration to decrease to Bumex 1 mg BID as he now appears euvolemic  · Monitor I and O  · Daily weights  · Cont  Atorvastatin  · Salt and fluid restriction  · Preliminary plan is for cardioMEMs tomorrows  2   PAF  ·  Cont  Eliquis + Metoprolol  Subjective     Patient seen and examined  Creatine stable from yesterday at 1 36 which is approximately his baseline  BP stable  He is negative 1 5 liters ove3r the last 24 hours, -7 L this admission  Weight is 140 kg today down from 142 kg yesterday  Home diuretic is torsemide 20 mg BID     Objective     Vitals: Temp (24hrs), Av 8 °F (36 6 °C), Min:97 8 °F (36 6 °C), Max:97 8 °F (36 6 °C)  Current: Temperature: 97 8 °F (36 6 °C)  Patient Vitals for the past 24 hrs:   BP Temp Pulse Resp SpO2   22 0710 128/63 -- 61 -- --   22 2334 114/56 -- 66 18 95 %   22 1734 116/67 -- 91 -- 94 %   22 1520 107/63 97 8 °F (36 6 °C) 60 18 92 %   22 1247 111/63 -- 68 -- 94 %    Body mass index is 19 31 kg/m²  Physical Exam:  GENERAL: NAD  HEENT:  NC/AT  CARDIAC:  RRR, +S1/S2, no S3/S4 heard, no m/g/r  PULMONARY:  CTA B/L, no wheezing/rales/rhonci, non-labored breathing  ABDOMEN:  Soft, NT/ND  Extremities:  Non pitting edema noted  NEUROLOGIC: Grossly intact  Psych: Normal affect    Invasive Devices  Report    Peripheral Intravenous Line            Peripheral IV 03/04/22 Dorsal (posterior); Proximal;Right Forearm 4 days                    Labs:   Results from last 7 days   Lab Units 03/08/22  0611 03/05/22  0536 03/04/22  0557 03/03/22  0521 03/03/22  0521 03/02/22  0530 03/02/22  0530   WBC Thousand/uL 9 68 8 10 8 55  --  9 29  --  9 47   HEMOGLOBIN g/dL 11 2* 10 2* 10 9*  --  10 3*  --  10 8*   HEMATOCRIT % 33 7* 32 0* 32 8*  --  31 0*  --  33 7*   PLATELETS Thousands/uL 211 183 228  --  246  --  269   NEUTROS PCT %  --   --  85*   < > 88*   < > 82*   MONOS PCT %  --   --  6  --  4  --  8    < > = values in this interval not displayed        Results from last 7 days   Lab Units 03/08/22  0611 03/07/22  0539 03/06/22  0539 03/05/22  0536 03/04/22  0557 03/03/22  0521 03/02/22  0530   SODIUM mmol/L 138 138 136 136 136 137 135*   POTASSIUM mmol/L 3 6 3 9 4 2 4 2 4 1 4 0 3 5   CHLORIDE mmol/L 102 103 102 101 99* 100 99*   CO2 mmol/L 30 33* 30 31 31 31 30   BUN mg/dL 28* 28* 25 26* 24 27* 24   CREATININE mg/dL 1 36* 1 37* 1 26 1 29 1 43* 1 44* 1 47*   CALCIUM mg/dL 7 3* 7 1* 7 2* 7 4* 7 1* 7 2* 7 2*   MAGNESIUM mg/dL  --   --   --   --  2 1 2 0  --    EGFR ml/min/1 73sq m 58 58 64 62 55 54 53                 No results found for: PHART, FZM2CHS, PO2ART, HXF1KSV, X8SLTPIB, BEART, SOURCE  No components found for: HIV1X2  Lab Results   Component Value Date    HEPCAB Non-reactive 04/23/2019     No results found for: SPEP, UPEP   Lab Results   Component Value Date    HGBA1C 6 3 (H) 03/07/2022    HGBA1C 7 0 (H) 11/10/2021    HGBA1C 7 1 (H) 07/18/2021     No results found for: CHOL   Lab Results   Component Value Date    HDL 66 10/01/2021    HDL 46 01/28/2020    HDL 40 04/23/2019      Lab Results   Component Value Date    LDLCALC 73 10/01/2021    LDLCALC 144 (H) 01/28/2020    LDLCALC 131 (H) 04/23/2019      Lab Results   Component Value Date    TRIG 90 10/01/2021    TRIG 126 01/28/2020    TRIG 126 04/23/2019     No components found for: PROCAL      Micro:      Urinalysis:  No results found for: AMPHETUR, BDZUR, COCAINEUR, OPIATEUR, PCPUR, THCUR, ETOH, ACTMNPHEN, SALICYLATE       Invalid input(s): URIBILINOGEN        Intake and Outputs:  I/O       03/06 0701  03/07 0700 03/07 0701 03/08 0700 03/08 0701 03/09 0700    P  O  840 580     I V  (mL/kg)       Total Intake(mL/kg) 840 (13) 580 (9)     Urine (mL/kg/hr) 2500 (1 6) 2150 (1 4)     Stool       Total Output 2500 2150     Net -1660 -1570                Nutrition:  Diet Roman/CHO Controlled; Consistent Carbohydrate Diet Level 2 (5 carb servings/75 grams CHO/meal); Fluid Restriction 1500 ML, Sodium 2 GM  Radiology Results:   XR chest 1 view portable   Final Result by Diana Sanches MD (03/01 1208)      No acute cardiopulmonary disease  Please refer to prior chest CT report for follow-up recommendations                 Workstation performed: [id number]           Scheduled Medications:  albuterol, 2 puff, 4x Daily  apixaban, 5 mg, BID  atorvastatin, 10 mg, Daily  budesonide-formoterol, 2 puff, BID  bumetanide, 2 mg, BID  docusate sodium, 100 mg, BID  guaiFENesin, 600 mg, BID  insulin glargine, 14 Units, HS  insulin lispro, 1-6 Units, TID AC  insulin lispro, 1-6 Units, HS  insulin lispro, 3 Units, TID With Meals  metoprolol succinate, 50 mg, Daily  montelukast, 10 mg, HS  potassium chloride, 20 mEq, BID  [START ON 3/11/2022] predniSONE, 10 mg, Daily  [START ON 3/9/2022] predniSONE, 20 mg, Daily  tiotropium, 18 mcg, Daily      PRN MEDS:  acetaminophen, 650 mg, Q6H PRN  albuterol, 2 puff, Q4H PRN  aluminum-magnesium hydroxide-simethicone, 30 mL, Q6H PRN  melatonin, 3 mg, HS PRN  ondansetron, 4 mg, Q6H PRN  oxyCODONE-acetaminophen, 1 tablet, Q4H PRN      Last 24 Hour Meds: :   Medication Administration - last 24 hours from 03/07/2022 0843 to 03/08/2022 0843       Date/Time Order Dose Route Action Action by     03/08/2022 0801 apixaban (ELIQUIS) tablet 5 mg 5 mg Oral Given Racquel Christiansen RN     03/07/2022 2216 apixaban (ELIQUIS) tablet 5 mg 5 mg Oral Given Daniel Ball, KERA     03/08/2022 0801 atorvastatin (LIPITOR) tablet 10 mg 10 mg Oral Given Racquel Christiansen RN     03/08/2022 0802 budesonide-formoterol (SYMBICORT) 160-4 5 mcg/act inhaler 2 puff 2 puff Inhalation Given Racquel Christiansen RN     03/07/2022 1730 budesonide-formoterol (SYMBICORT) 160-4 5 mcg/act inhaler 2 puff 2 puff Inhalation Given Racquel Christiansen RN     03/08/2022 0802 guaiFENesin (MUCINEX) 12 hr tablet 600 mg 600 mg Oral Given Racquel Christiansen RN     03/07/2022 1729 guaiFENesin (MUCINEX) 12 hr tablet 600 mg 600 mg Oral Given Racquel Christiansen RN     03/07/2022 2217 montelukast (SINGULAIR) tablet 10 mg 10 mg Oral Given Romina Livingston, KERA     03/08/2022 0802 potassium chloride (K-DUR,KLOR-CON) CR tablet 20 mEq 20 mEq Oral Given Racquel Christiansen RN     03/07/2022 1729 potassium chloride (K-DUR,KLOR-CON) CR tablet 20 mEq 20 mEq Oral Given Racquel Christiansen RN     03/08/2022 0802 docusate sodium (COLACE) capsule 100 mg 100 mg Oral Given Racquel Christiansen RN     03/07/2022 1729 docusate sodium (COLACE) capsule 100 mg 100 mg Oral Given Racquel Christiansen RN     03/08/2022 0745 insulin lispro (HumaLOG) 100 units/mL subcutaneous injection 1-6 Units 1 Units Subcutaneous Not Given Racquel Christiansen RN     03/07/2022 1628 insulin lispro (HumaLOG) 100 units/mL subcutaneous injection 1-6 Units 3 Units Subcutaneous Given Racquel Christiansen RN     03/07/2022 1107 insulin lispro (HumaLOG) 100 units/mL subcutaneous injection 1-6 Units 1 Units Subcutaneous Given Racquel Christiansen RN     03/07/2022 2216 insulin lispro (HumaLOG) 100 units/mL subcutaneous injection 1-6 Units 2 Units Subcutaneous Given Stan Carballo RN     03/07/2022 2223 melatonin tablet 3 mg 3 mg Oral Given Stan Carballo RN     03/08/2022 0802 metoprolol succinate (TOPROL-XL) 24 hr tablet 50 mg 50 mg Oral Given Dony Terrazas RN     03/08/2022 0801 albuterol (PROVENTIL HFA,VENTOLIN HFA) inhaler 2 puff 2 puff Inhalation Given Dony Terrazas RN     03/07/2022 1735 albuterol (PROVENTIL HFA,VENTOLIN HFA) inhaler 2 puff 2 puff Inhalation Given Dony Terrazas RN     03/07/2022 1108 albuterol (PROVENTIL HFA,VENTOLIN HFA) inhaler 2 puff 2 puff Inhalation Given Dony Terrazas RN     03/07/2022 2216 insulin glargine (LANTUS) subcutaneous injection 14 Units 0 14 mL 14 Units Subcutaneous Given Romina Livingston RN     03/08/2022 0756 insulin lispro (HumaLOG) 100 units/mL subcutaneous injection 3 Units 3 Units Subcutaneous Given Dony Terrazas RN     03/07/2022 1629 insulin lispro (HumaLOG) 100 units/mL subcutaneous injection 3 Units 3 Units Subcutaneous Given Dony Terrazas RN     03/07/2022 1107 insulin lispro (HumaLOG) 100 units/mL subcutaneous injection 3 Units 3 Units Subcutaneous Given Dony Terrazas RN     03/08/2022 0802 bumetanide (BUMEX) tablet 2 mg 2 mg Oral Given Dony Terrazas RN     03/07/2022 1734 bumetanide (BUMEX) tablet 2 mg 2 mg Oral Given Dony Terrazas RN     03/07/2022 1246 bumetanide (BUMEX) tablet 2 mg 2 mg Oral Given Dony Terrazas RN     03/08/2022 0802 predniSONE tablet 30 mg 30 mg Oral Given Dony Terrzaas RN          PLEASE NOTE:  This encounter was completed utilizing the [employer] Direct Speech Voice Recognition Software  Grammatical errors, random word insertions, pronoun errors and incomplete sentences are occasional consequences of the system due to software limitations, ambient noise and hardware issues  These may be missed by proof reading prior to affixing electronic signature   Any questions or concerns about the content, text or information contained within the body of this dictation should be directly addressed to the physician for clarification  Please do not hesitate to call me directly if you have any any questions or concerns

## 2022-03-08 NOTE — ASSESSMENT & PLAN NOTE
Lab Results   Component Value Date    EGFR 58 03/08/2022    EGFR 58 03/07/2022    EGFR 64 03/06/2022    CREATININE 1 36 (H) 03/08/2022    CREATININE 1 37 (H) 03/07/2022    CREATININE 1 26 03/06/2022   · Baseline Cr appears to be between 1 3-1 7  · Cr stable at baseline  · Avoid nephrotoxins

## 2022-03-09 LAB
GLUCOSE SERPL-MCNC: 106 MG/DL (ref 65–140)
GLUCOSE SERPL-MCNC: 135 MG/DL (ref 65–140)
GLUCOSE SERPL-MCNC: 229 MG/DL (ref 65–140)
GLUCOSE SERPL-MCNC: 360 MG/DL (ref 65–140)

## 2022-03-09 PROCEDURE — C1760 CLOSURE DEV, VASC: HCPCS | Performed by: INTERNAL MEDICINE

## 2022-03-09 PROCEDURE — 33289 TCAT IMPL WRLS P-ART PRS SNR: CPT | Performed by: INTERNAL MEDICINE

## 2022-03-09 PROCEDURE — C1769 GUIDE WIRE: HCPCS | Performed by: INTERNAL MEDICINE

## 2022-03-09 PROCEDURE — 99152 MOD SED SAME PHYS/QHP 5/>YRS: CPT | Performed by: INTERNAL MEDICINE

## 2022-03-09 PROCEDURE — 82810 BLOOD GASES O2 SAT ONLY: CPT | Performed by: INTERNAL MEDICINE

## 2022-03-09 PROCEDURE — C2624 WIRELESS PRESSURE SENSOR: HCPCS | Performed by: INTERNAL MEDICINE

## 2022-03-09 PROCEDURE — C1894 INTRO/SHEATH, NON-LASER: HCPCS | Performed by: INTERNAL MEDICINE

## 2022-03-09 PROCEDURE — C1751 CATH, INF, PER/CENT/MIDLINE: HCPCS | Performed by: INTERNAL MEDICINE

## 2022-03-09 PROCEDURE — 82948 REAGENT STRIP/BLOOD GLUCOSE: CPT

## 2022-03-09 PROCEDURE — 99232 SBSQ HOSP IP/OBS MODERATE 35: CPT | Performed by: PHYSICIAN ASSISTANT

## 2022-03-09 PROCEDURE — 99232 SBSQ HOSP IP/OBS MODERATE 35: CPT | Performed by: INTERNAL MEDICINE

## 2022-03-09 DEVICE — IMPLANTABLE DEVICE: Type: IMPLANTABLE DEVICE | Site: ARTERIAL | Status: FUNCTIONAL

## 2022-03-09 DEVICE — PERCLOSE™ PROSTYLE™ SUTURE-MEDIATED CLOSURE AND REPAIR SYSTEM
Type: IMPLANTABLE DEVICE | Site: GROIN | Status: FUNCTIONAL
Brand: PERCLOSE™ PROSTYLE™

## 2022-03-09 RX ORDER — FENTANYL CITRATE 50 UG/ML
INJECTION, SOLUTION INTRAMUSCULAR; INTRAVENOUS AS NEEDED
Status: DISCONTINUED | OUTPATIENT
Start: 2022-03-09 | End: 2022-03-09 | Stop reason: HOSPADM

## 2022-03-09 RX ORDER — MIDAZOLAM HYDROCHLORIDE 2 MG/2ML
INJECTION, SOLUTION INTRAMUSCULAR; INTRAVENOUS AS NEEDED
Status: DISCONTINUED | OUTPATIENT
Start: 2022-03-09 | End: 2022-03-09 | Stop reason: HOSPADM

## 2022-03-09 RX ORDER — LIDOCAINE HYDROCHLORIDE 10 MG/ML
INJECTION, SOLUTION EPIDURAL; INFILTRATION; INTRACAUDAL; PERINEURAL AS NEEDED
Status: DISCONTINUED | OUTPATIENT
Start: 2022-03-09 | End: 2022-03-09 | Stop reason: HOSPADM

## 2022-03-09 RX ADMIN — GUAIFENESIN 600 MG: 600 TABLET, EXTENDED RELEASE ORAL at 17:22

## 2022-03-09 RX ADMIN — ALBUTEROL SULFATE 2 PUFF: 90 AEROSOL, METERED RESPIRATORY (INHALATION) at 17:22

## 2022-03-09 RX ADMIN — POTASSIUM CHLORIDE 20 MEQ: 1500 TABLET, EXTENDED RELEASE ORAL at 17:22

## 2022-03-09 RX ADMIN — INSULIN LISPRO 2 UNITS: 100 INJECTION, SOLUTION INTRAVENOUS; SUBCUTANEOUS at 17:22

## 2022-03-09 RX ADMIN — ALBUTEROL SULFATE 2 PUFF: 90 AEROSOL, METERED RESPIRATORY (INHALATION) at 09:54

## 2022-03-09 RX ADMIN — ATORVASTATIN CALCIUM 10 MG: 10 TABLET, FILM COATED ORAL at 09:51

## 2022-03-09 RX ADMIN — INSULIN LISPRO 3 UNITS: 100 INJECTION, SOLUTION INTRAVENOUS; SUBCUTANEOUS at 17:22

## 2022-03-09 RX ADMIN — METOPROLOL SUCCINATE 50 MG: 50 TABLET, EXTENDED RELEASE ORAL at 09:52

## 2022-03-09 RX ADMIN — Medication 3 MG: at 21:52

## 2022-03-09 RX ADMIN — INSULIN LISPRO 6 UNITS: 100 INJECTION, SOLUTION INTRAVENOUS; SUBCUTANEOUS at 21:49

## 2022-03-09 RX ADMIN — MONTELUKAST 10 MG: 10 TABLET, FILM COATED ORAL at 21:49

## 2022-03-09 RX ADMIN — GUAIFENESIN 600 MG: 600 TABLET, EXTENDED RELEASE ORAL at 09:52

## 2022-03-09 RX ADMIN — PREDNISONE 20 MG: 20 TABLET ORAL at 09:51

## 2022-03-09 RX ADMIN — BUDESONIDE AND FORMOTEROL FUMARATE DIHYDRATE 2 PUFF: 160; 4.5 AEROSOL RESPIRATORY (INHALATION) at 09:54

## 2022-03-09 RX ADMIN — INSULIN LISPRO 3 UNITS: 100 INJECTION, SOLUTION INTRAVENOUS; SUBCUTANEOUS at 11:56

## 2022-03-09 RX ADMIN — POTASSIUM CHLORIDE 20 MEQ: 1500 TABLET, EXTENDED RELEASE ORAL at 09:52

## 2022-03-09 RX ADMIN — APIXABAN 5 MG: 5 TABLET, FILM COATED ORAL at 17:22

## 2022-03-09 RX ADMIN — ALBUTEROL SULFATE 2 PUFF: 90 AEROSOL, METERED RESPIRATORY (INHALATION) at 11:57

## 2022-03-09 RX ADMIN — DOCUSATE SODIUM 100 MG: 100 CAPSULE ORAL at 17:22

## 2022-03-09 RX ADMIN — TIOTROPIUM BROMIDE 18 MCG: 18 CAPSULE ORAL; RESPIRATORY (INHALATION) at 09:54

## 2022-03-09 RX ADMIN — INSULIN GLARGINE 14 UNITS: 100 INJECTION, SOLUTION SUBCUTANEOUS at 21:49

## 2022-03-09 RX ADMIN — BUDESONIDE AND FORMOTEROL FUMARATE DIHYDRATE 2 PUFF: 160; 4.5 AEROSOL RESPIRATORY (INHALATION) at 17:23

## 2022-03-09 RX ADMIN — ALBUTEROL SULFATE 2 PUFF: 90 AEROSOL, METERED RESPIRATORY (INHALATION) at 21:49

## 2022-03-09 NOTE — PROGRESS NOTES
Cardiology Progress note  Unit/Bed#: [facility] 510-01 Encounter: [id number]        Shannan Arora 49 y o  male [id number]  Hospital Stay Days: 7    Assessment and Plan      Current Problem List   Principal Problem:    Acute on chronic diastolic heart failure [facility])  Active Problems:    Essential hypertension    Type 2 diabetes mellitus with hyperglycemia (HCC)    Morbid obesity (HCC)    VICKY (obstructive sleep apnea)    Moderate asthma with acute exacerbation    Stage 3a chronic kidney disease (HCC)    Paroxysmal atrial fibrillation (HCC)    Assessment/Plan:  1  Acute on chronic diastolic CHF - likely secondary to asthma + VICKY superimposed CKD stage 3 and PAF  ACC/AHA stage C  NYHA class III  Home regimen includes torsemide 20 mg BID  Weight today is 140 kg down from 142 kg yesterday  Also on metoprolol  Frequent exacerbations secondary to asthma  Superimposed asthma exacerbation this admission  ? Continue Metoprolol  ? Continue PO Bumex decrease to Bumex 1 mg BID as he now appears euvolemic  ? Monitor I and O  ? Daily weights  ? Cont  Atorvastatin  ? Salt and fluid restriction  ? S/P cardioMEM  2   PAF  ? Cont  Eliquis + Metoprolol - eliquis off for procedure restart this evening  ·     Subjective     No acute events overnight  Patient went for procedure this a m  No events on telemetry      Objective     Vitals: Temp (24hrs), Av °F (36 7 °C), Min:97 8 °F (36 6 °C), Max:98 5 °F (36 9 °C)  Current: Temperature: 97 9 °F (36 6 °C)  Patient Vitals for the past 24 hrs:   BP Temp Temp src Pulse Resp SpO2 Weight   22 0952 139/75 -- -- 56 -- 97 % --   22 0952 139/75 -- -- -- -- -- --   22 0751 -- -- -- -- -- 99 % --   22 0710 142/71 97 9 °F (36 6 °C) Oral 56 18 97 % --   22 0546 -- -- -- -- -- -- (!) 139 kg (307 lb 1 6 oz)   22 132/75 97 8 °F (36 6 °C) Oral 70 18 96 % --   22 132/75 97 8 °F (36 6 °C) -- 62 -- 95 % --   22 1852 118/93 97 9 °F (36 6 °C) Oral 70 18 93 % --   03/08/22 1625 125/65 98 5 °F (36 9 °C) -- 65 16 94 % --    Body mass index is 41 65 kg/m²  Physical Exam:  GENERAL: NAD  HEENT:  NC/AT  CARDIAC:   no m/g/r, no jvp  PULMONARY:  CTA B/  ABDOMEN:  Soft, NT/ND,no rebound/guarding/rigidity  Extremities:  No edema  Psych: Normal affect    Invasive Devices  Report    Peripheral Intravenous Line            Peripheral IV 03/08/22 Left Forearm <1 day                    Labs:   Results from last 7 days   Lab Units 03/08/22  0611 03/05/22  0536 03/04/22  0557 03/03/22  0521 03/03/22  0521   WBC Thousand/uL 9 68 8 10 8 55  --  9 29   HEMOGLOBIN g/dL 11 2* 10 2* 10 9*  --  10 3*   HEMATOCRIT % 33 7* 32 0* 32 8*  --  31 0*   PLATELETS Thousands/uL 211 183 228  --  246   NEUTROS PCT %  --   --  85*   < > 88*   MONOS PCT %  --   --  6  --  4    < > = values in this interval not displayed        Results from last 7 days   Lab Units 03/08/22  0611 03/07/22  0539 03/06/22  0539 03/05/22  0536 03/04/22  0557 03/03/22  0521   SODIUM mmol/L 138 138 136 136 136 137   POTASSIUM mmol/L 3 6 3 9 4 2 4 2 4 1 4 0   CHLORIDE mmol/L 102 103 102 101 99* 100   CO2 mmol/L 30 33* 30 31 31 31   BUN mg/dL 28* 28* 25 26* 24 27*   CREATININE mg/dL 1 36* 1 37* 1 26 1 29 1 43* 1 44*   CALCIUM mg/dL 7 3* 7 1* 7 2* 7 4* 7 1* 7 2*   MAGNESIUM mg/dL  --   --   --   --  2 1 2 0   EGFR ml/min/1 73sq m 58 58 64 62 55 54                 No results found for: PHART, HAX4YRF, PO2ART, JDF5JHM, W8TITTAF, BEART, SOURCE  No components found for: HIV1X2  Lab Results   Component Value Date    HEPCAB Non-reactive 04/23/2019     No results found for: SPEP, UPEP   Lab Results   Component Value Date    HGBA1C 6 3 (H) 03/07/2022    HGBA1C 7 0 (H) 11/10/2021    HGBA1C 7 1 (H) 07/18/2021     No results found for: CHOL   Lab Results   Component Value Date    HDL 66 10/01/2021    HDL 46 01/28/2020    HDL 40 04/23/2019      Lab Results   Component Value Date    LDLCALC 73 10/01/2021    LDLCALC 144 (H) 01/28/2020    LDLCALC 131 (H) 04/23/2019      Lab Results   Component Value Date    TRIG 90 10/01/2021    TRIG 126 01/28/2020    TRIG 126 04/23/2019     No components found for: PROCAL      Micro:      Urinalysis:  No results found for: AMPHETUR, BDZUR, COCAINEUR, OPIATEUR, PCPUR, THCUR, ETOH, ACTMNPHEN, SALICYLATE       Invalid input(s): URIBILINOGEN        Intake and Outputs:  I/O       03/07 0701 03/08 0700 03/08 0701 03/09 0700 03/09 0701  03/10 0700    P  O  580 660 180    Total Intake(mL/kg) 580 (9) 660 (4 7) 180 (1 3)    Urine (mL/kg/hr) 2150 (1 4) 2550 (0 8)     Total Output 2150 2550     Net -1570 -1890 +180               Nutrition:  Diet Roman/CHO Controlled; Consistent Carbohydrate Diet Level 2 (5 carb servings/75 grams CHO/meal); Fluid Restriction 1500 ML, Sodium 2 GM  Radiology Results:   XR chest 1 view portable   Final Result by Moreno Dos Santos MD (03/01 1208)      No acute cardiopulmonary disease  Please refer to prior chest CT report for follow-up recommendations                 Workstation performed: [id number]           Scheduled Medications:  albuterol, 2 puff, 4x Daily  atorvastatin, 10 mg, Daily  budesonide-formoterol, 2 puff, BID  docusate sodium, 100 mg, BID  guaiFENesin, 600 mg, BID  insulin glargine, 14 Units, HS  insulin lispro, 1-6 Units, TID AC  insulin lispro, 1-6 Units, HS  insulin lispro, 3 Units, TID With Meals  metoprolol succinate, 50 mg, Daily  montelukast, 10 mg, HS  potassium chloride, 20 mEq, BID  [START ON 3/11/2022] predniSONE, 10 mg, Daily  predniSONE, 20 mg, Daily  tiotropium, 18 mcg, Daily      PRN MEDS:  acetaminophen, 650 mg, Q6H PRN  albuterol, 2 puff, Q4H PRN  aluminum-magnesium hydroxide-simethicone, 30 mL, Q6H PRN  melatonin, 3 mg, HS PRN  ondansetron, 4 mg, Q6H PRN  oxyCODONE-acetaminophen, 1 tablet, Q4H PRN      Last 24 Hour Meds: :   Medication Administration - last 24 hours from 03/08/2022 1328 to 03/09/2022 1328       Date/Time Order Dose Route Action Action by 03/09/2022 0951 atorvastatin (LIPITOR) tablet 10 mg 10 mg Oral Given Alesha Benjamin, RN     03/09/2022 0954 budesonide-formoterol (SYMBICORT) 160-4 5 mcg/act inhaler 2 puff 2 puff Inhalation Given Alesha Benjamin, RN     03/08/2022 1739 budesonide-formoterol (SYMBICORT) 160-4 5 mcg/act inhaler 2 puff 2 puff Inhalation Given London Nance, RN     03/09/2022 0952 guaiFENesin (MUCINEX) 12 hr tablet 600 mg 600 mg Oral Given Alesha Benjamin, RN     03/08/2022 1739 guaiFENesin (MUCINEX) 12 hr tablet 600 mg 600 mg Oral Given London Nance, RN     03/08/2022 2127 montelukast (SINGULAIR) tablet 10 mg 10 mg Oral Given Tyler Ramos, KERA     03/09/2022 8576 potassium chloride (K-DUR,KLOR-CON) CR tablet 20 mEq 20 mEq Oral Given Alesha Benjamin, RN     03/08/2022 1739 potassium chloride (K-DUR,KLOR-CON) CR tablet 20 mEq 20 mEq Oral Given London Nance, RN     03/09/2022 0954 tiotropium (SPIRIVA) capsule for inhaler 18 mcg 18 mcg Inhalation Given Alesha Benjamin, RN     03/09/2022 2277 docusate sodium (COLACE) capsule 100 mg 100 mg Oral Not Given Alesha Benjamin, KERA     03/08/2022 1739 docusate sodium (COLACE) capsule 100 mg 100 mg Oral Given London Nance, KERA     03/09/2022 1052 insulin lispro (HumaLOG) 100 units/mL subcutaneous injection 1-6 Units 1 Units Subcutaneous Not Given Alesha Benjamin, KERA     03/09/2022 0720 insulin lispro (HumaLOG) 100 units/mL subcutaneous injection 1-6 Units 1 Units Subcutaneous Not Given Alesha Benjamin, KERA     03/08/2022 1641 insulin lispro (HumaLOG) 100 units/mL subcutaneous injection 1-6 Units 2 Units Subcutaneous Given London Nance RN     03/08/2022 2127 insulin lispro (HumaLOG) 100 units/mL subcutaneous injection 1-6 Units 2 Units Subcutaneous Given Tyler Ramos RN     03/08/2022 2127 melatonin tablet 3 mg 3 mg Oral Given Tyler Ramos RN     03/09/2022 1174 metoprolol succinate (TOPROL-XL) 24 hr tablet 50 mg 50 mg Oral Given Alesha Benjamin RN 03/09/2022 1157 albuterol (PROVENTIL HFA,VENTOLIN HFA) inhaler 2 puff 2 puff Inhalation Given Ingrid Dent, RN     03/09/2022 0954 albuterol (PROVENTIL HFA,VENTOLIN HFA) inhaler 2 puff 2 puff Inhalation Given Ingrid Dent, RN     03/08/2022 2126 albuterol (PROVENTIL HFA,VENTOLIN HFA) inhaler 2 puff 2 puff Inhalation Given Jero Camilla, RN     03/08/2022 1738 albuterol (PROVENTIL HFA,VENTOLIN HFA) inhaler 2 puff 2 puff Inhalation Given Serene Katia, RN     03/08/2022 2127 insulin glargine (LANTUS) subcutaneous injection 14 Units 0 14 mL 14 Units Subcutaneous Given Jero Camilla, RN     03/09/2022 1156 insulin lispro (HumaLOG) 100 units/mL subcutaneous injection 3 Units 3 Units Subcutaneous Given Ingrid Dent, RN     03/09/2022 3528 insulin lispro (HumaLOG) 100 units/mL subcutaneous injection 3 Units 0 Units Subcutaneous Hold Ingrid Dent, RN     03/08/2022 1642 insulin lispro (HumaLOG) 100 units/mL subcutaneous injection 3 Units 3 Units Subcutaneous Given Sharonda Montalvo, RN     03/09/2022 6244 predniSONE tablet 20 mg 20 mg Oral Given Ingrid Dent, RN     03/09/2022 0946 bumetanide (BUMEX) tablet 1 mg 1 mg Oral Not Given Ingrid Dent, RN     03/08/2022 1739 bumetanide (BUMEX) tablet 1 mg 1 mg Oral Given Sharonda Montalvo, RN          PLEASE NOTE:  This encounter was completed utilizing the [employer] Direct Speech Voice Recognition Software  Grammatical errors, random word insertions, pronoun errors and incomplete sentences are occasional consequences of the system due to software limitations, ambient noise and hardware issues  These may be missed by proof reading prior to affixing electronic signature  Any questions or concerns about the content, text or information contained within the body of this dictation should be directly addressed to the physician for clarification  Please do not hesitate to call me directly if you have any any questions or concerns

## 2022-03-09 NOTE — ASSESSMENT & PLAN NOTE
Wt Readings from Last 3 Encounters:   03/09/22 (!) 139 kg (307 lb 1 6 oz)   02/22/22 (!) 143 kg (315 lb 1 6 oz)   02/12/22 (!) 145 kg (319 lb)     · Multiple recent admissions with CHF exacerbation  · Patient on outpatient torsemide 40 mg b i d  which was increased recently  · Follows with Dr Yue Payton outpatient  · Not on home oxygen at baseline   · BNP elevated 805, CXR unremarkable  · Diuresis changed from Lasix to Bumex 2 mg IV BID on 3/4 with clinical improvement  · Changed to PO Bumex 1 mg BID -- held dose today   · Cardiology input appreciated    S/p cardioMEMs implant on 3/9   · Monitor I/O, daily weights

## 2022-03-09 NOTE — ASSESSMENT & PLAN NOTE
Lab Results   Component Value Date    HGBA1C 6 3 (H) 03/07/2022       Recent Labs     03/08/22  1623 03/08/22  2052 03/09/22  0609 03/09/22  1042   POCGLU 214* 190* 106 135       Blood Sugar Average: Last 72 hrs:  (P) 179 [id number]   · Hold home oral meds while inpatient  · Steroid induced hyperglycemia  · Ct Lantus 14 hs, Humalog 3 TID, SSI  · Diabetic diet  · Hypoglycemia protocol  · Monitor blood sugars and adjust insulin as needed - decrease dose as steroid tapered

## 2022-03-09 NOTE — UTILIZATION REVIEW
Continued Stay Review  Date: 3/8/22 Tuesday - 3/9/22 Wednesday                        Current Patient Class: Inpatient   Current Level of Care: Acute Med Surg    HPI:  48 y/o male with PMHx HTN, HLD, CHF on torsemide, Atrial Fib on Eliquis, Chronic Respiratory Failure - Asthma/ VICKY on CPAP, DM2, CKD Stage 3  -  initially presented to [facility] ED and Placed in Observation on 3/1/22 then Converted to Inpatient on 3/2/22 2nd TELLES with Acute on Chronic diastolic CHF     4/5/31 HEART FAILURE:  Acute on chronic diastolic CHF - likely secondary to asthma + VICKY superimposed CKD stage 3 and PAF  ACC/AHA stage C  NYHA class III  Home regimen includes torsemide 20 mg BID  Weight today is 140 kg down from 142 kg yesterday  Also on metoprolol  Frequent exacerbations secondary to asthma  Superimposed asthma exacerbation this admission  Plan: Continue Metoprolol, Continue PO Bumex decrease to Bumex 1 mg BID as he now appears euvolemic, Monitor I and O, Daily weights, Continue atorvastatin, Salt and fluid restriction, S/P cardioMEM       3/9/22 RIGHT HEART CATH WITH PRESSURE SENSOR:  IMPRESSION:  Normal right and left sided filling pressures  Normal cardiac output   Successful implant of pulmonary artery sensor (CardioMems)   /73  HR 54  O2 sat 99%  Hb 10 7     RA 4  RV 35/4  PA 35/12/21  PCWP 10  PA sat 64%  Travis CO 5 56 L/min  Travis CI 2 2L/min/m2  PVR 1 97 SAGASTUME    Vital Signs:   Date/Time Temp Pulse Resp BP MAP (mmHg) SpO2 O2 Device Patient Position - Orthostatic VS   03/09/22 1000 -- -- -- -- -- -- None (Room air) --   03/09/22 09:52:15 -- 56 -- 139/75 96 97 % -- --   03/09/22 0952 -- -- -- 139/75 -- -- -- --   03/09/22 07:51:23 -- -- -- -- -- 99 % None (Room air) --   03/09/22 07:10:37 97 9 °F (36 6 °C) 56 18 142/71 95 97 % -- Lying   03/08/22 21:52:53 97 8 °F (36 6 °C) 70 18 132/75 94 96 % None (Room air) Lying   03/08/22 21:52:35 97 8 °F (36 6 °C) 62 -- 132/75 94 95 % -- --   03/08/22 18:52:05 97 9 °F (36 6 °C) 70 18 118/93 -- 93 % None (Room air) --   03/08/22 16:25:53 98 5 °F (36 9 °C) 65 16 125/65 85 94 % -- --      03/07 0701 03/08 0700 03/08 0701 03/09 0700 03/09 0701   -   P  O  580 660 180   Total Intake(mL/kg) 580 (9) 660 (4 7) 180 (1 3)   Urine (mL/kg/hr) 2150 (1 4) 2550 (0 8)    Total Output 2150 2550    Net -1570 -1890 +180       Date/Time Weight Weight Method Height   03/09/22 0546 139 kg (307 lb 1 6 oz) Abnormal  Standing scale --   03/07/22 0600 64 6 kg (142 lb 6 4 oz) Standing scale --   03/06/22 0543 143 kg (314 lb 13 1 oz) Abnormal  Standing scale --   03/05/22 0600 143 kg (315 lb 11 2 oz) Abnormal  Standing scale --   03/04/22 0600 144 kg (317 lb 6 4 oz) Abnormal  Standing scale --     Pertinent Labs/Diagnostic Results:   Results from last 7 days   Lab Units 03/08/22  0611 03/05/22  0536 03/04/22  0557 03/03/22  0521 03/03/22  0521   WBC Thousand/uL 9 68 8 10 8 55   < > 9 29   HEMOGLOBIN g/dL 11 2* 10 2* 10 9*  --  10 3*   HEMATOCRIT % 33 7* 32 0* 32 8*  --  31 0*   PLATELETS Thousands/uL 211 183 228   < > 246   NEUTROS ABS Thousands/µL  --   --  7 23  --  8 10*     Results from last 7 days   Lab Units 03/08/22  0611 03/07/22  0539 03/06/22  0539 03/05/22  0536 03/04/22  0557 03/03/22  0521 03/03/22  0521   SODIUM mmol/L 138 138 136 136 136   < > 137   POTASSIUM mmol/L 3 6 3 9 4 2 4 2 4 1   < > 4 0   CHLORIDE mmol/L 102 103 102 101 99*   < > 100   CO2 mmol/L 30 33* 30 31 31   < > 31   ANION GAP mmol/L 6 2* 4 4 6   < > 6   BUN mg/dL 28* 28* 25 26* 24   < > 27*   CREATININE mg/dL 1 36* 1 37* 1 26 1 29 1 43*   < > 1 44*   EGFR ml/min/1 73sq m 58 58 64 62 55   < > 54   CALCIUM mg/dL 7 3* 7 1* 7 2* 7 4* 7 1*   < > 7 2*   MAGNESIUM mg/dL  --   --   --   --  2 1  --  2 0     Results from last 7 days   Lab Units 03/09/22  1042 03/09/22  0609 03/08/22  2052 03/08/22  1623 03/08/22  1030 03/08/22  0713 03/07/22  2054 03/07/22  1537 03/07/22  1041 03/07/22  0631 03/06/22  2108 03/06/22  1536   POC GLUCOSE mg/dl 135 106 190* 214* 211* 129 227* 232* 178* 116 115 311*     Results from last 7 days   Lab Units 03/08/22  0611 03/07/22  0539 03/06/22  0539 03/05/22  0536 03/04/22  0557 03/03/22  0521   GLUCOSE RANDOM mg/dL 133 105 149* 213* 237* 195*     Results from last 7 days   Lab Units 03/07/22  0539   HEMOGLOBIN A1C % 6 3*   EAG mg/dl 134     Diet Roman/CHO Controlled; Consistent Carbohydrate Diet Level 2 (5 carb servings/75 grams CHO/meal); Fluid Restriction 1500 ML, Sodium 2 GM     Scheduled Medications:  albuterol, 2 puff, Inhalation, 4x Daily  apixaban, 5 mg, Oral, BID  atorvastatin, 10 mg, Oral, Daily  budesonide-formoterol, 2 puff, Inhalation, BID  docusate sodium, 100 mg, Oral, BID  guaiFENesin, 600 mg, Oral, BID  insulin glargine, 14 Units, Subcutaneous, HS  insulin lispro, 1-6 Units, Subcutaneous, TID AC  insulin lispro, 1-6 Units, Subcutaneous, HS  insulin lispro, 3 Units, Subcutaneous, TID With Meals  metoprolol succinate, 50 mg, Oral, Daily  montelukast, 10 mg, Oral, HS  potassium chloride, 20 mEq, Oral, BID  [START ON 3/11/2022] predniSONE, 10 mg, Oral, Daily  predniSONE, 20 mg, Oral, Daily  tiotropium, 18 mcg, Inhalation, Daily    Continuous IV Infusions:  NONE    PRN Meds:  acetaminophen, 650 mg, Oral, Q6H PRN  albuterol, 2 puff, Inhalation, Q4H PRN  aluminum-magnesium hydroxide-simethicone, 30 mL, Oral, Q6H PRN  melatonin, 3 mg, Oral, HS PRN  ondansetron, 4 mg, Intravenous, Q6H PRN  oxyCODONE-acetaminophen, 1 tablet, Oral, Q4H PRN    Discharge Plan: To be determined   Inpatient Case Management following for all discharge needs    Network Utilization Review Department  ATTENTION: Please call with any questions or concerns to [phone] and carefully listen to the prompts so that you are directed to the right person   All voicemails are confidential   Donovan Chantel all requests for admission clinical reviews, approved or denied determinations and any other requests to dedicated fax number below belonging to the campus where the patient is receiving treatment   List of dedicated fax numbers for the Facilities:  [address] [place] [address] DENIALS (Administrative/Medical Necessity) [phone]   [address] (Maternity/NICU/Pediatrics) [phone] [address] [place] [address]  [address] [facility] [place] [address] [place] [address] [address] [facility]51 [phone]

## 2022-03-09 NOTE — PROGRESS NOTES
[address] [facility]  Progress Note - Jonathon Pae 1965, 49 y o  male MRN: [id number]  Unit/Bed#: [facility] 510-01 Encounter: [id number]  Primary Care Provider: Santos Hamilton MD   Date and time admitted to hospital: 3/1/2022  6:05 AM    * Acute on chronic diastolic heart failure [facility])  Assessment & Plan  Wt Readings from Last 3 Encounters:   03/09/22 (!) 139 kg (307 lb 1 6 oz)   02/22/22 (!) 143 kg (315 lb 1 6 oz)   02/12/22 (!) 145 kg (319 lb)     · Multiple recent admissions with CHF exacerbation  · Patient on outpatient torsemide 40 mg b i d  which was increased recently  · Follows with Dr Homar Yu outpatient  · Not on home oxygen at baseline   · BNP elevated 805, CXR unremarkable  · Diuresis changed from Lasix to Bumex 2 mg IV BID on 3/4 with clinical improvement  · Changed to PO Bumex 1 mg BID -- held dose today   · Cardiology input appreciated  S/p cardioMEMs implant on 3/9   · Monitor I/O, daily weights    Paroxysmal atrial fibrillation (HCC)  Assessment & Plan  · New diagnosis in 2/2022  · NSR at present  · Ct BB, Eliquis      Stage 3a chronic kidney disease [facility])  Assessment & Plan  Lab Results   Component Value Date    EGFR 58 03/08/2022    EGFR 58 03/07/2022    EGFR 64 03/06/2022    CREATININE 1 36 (H) 03/08/2022    CREATININE 1 37 (H) 03/07/2022    CREATININE 1 26 03/06/2022   · Baseline Cr appears to be between 1 3-1 7  · Cr stable at baseline  · Avoid nephrotoxins    Moderate asthma with acute exacerbation  Assessment & Plan  · Patient with history of moderate asthma presented to ER with complaint of increasing shortness of breath, hypoxia, diffuse wheezing  · Prednisone begun this am - taper  · Continue home inhaler Symbicort, Spiriva, Singulair, Albuterol p r n    · Respiratory protocol on board     VICKY (obstructive sleep apnea)  Assessment & Plan  · Has CPAP machine at home but not using it since a few months as it has been recalled  · Declines using hospital CPAP  · Awaiting new CPAP at home    Morbid obesity [facility])  Assessment & Plan  Body mass index is 41 65 kg/m²  · Lifestyle modification    Type 2 diabetes mellitus with hyperglycemia [facility])  Assessment & Plan  Lab Results   Component Value Date    HGBA1C 6 3 (H) 2022       Recent Labs     22  1623 22  2052 22  0609 22  1042   POCGLU 214* 190* 106 135       Blood Sugar Average: Last 72 hrs:  (P) 179 [id number]   · Hold home oral meds while inpatient  · Steroid induced hyperglycemia  · Ct Lantus 14 hs, Humalog 3 TID, SSI  · Diabetic diet  · Hypoglycemia protocol  · Monitor blood sugars and adjust insulin as needed - decrease dose as steroid tapered    Essential hypertension  Assessment & Plan  · Continue home medication metoprolol,   · Bumex as above  · BP acceptable          VTE Pharmacologic Prophylaxis: VTE Score: 6 High Risk (Score >/= 5) - Pharmacological DVT Prophylaxis Ordered: apixaban (Eliquis)  Sequential Compression Devices Ordered  Patient Centered Rounds: I performed bedside rounds with nursing staff today  Discussions with Specialists or Other Care Team Provider: RN     Education and Discussions with Family / Patient: Patient declined call to   Time Spent for Care: 20 minutes  More than 50% of total time spent on counseling and coordination of care as described above  Current Length of Stay: 7 day(s)  Current Patient Status: Inpatient   Certification Statement: The patient will continue to require additional inpatient hospital stay due to CHF  Discharge Plan: Anticipate discharge tomorrow to home      Code Status: Level 1 - Full Code    Subjective:   Pt has no acute complaints, states he feels great today     Objective:     Vitals:   Temp (24hrs), Av °F (36 7 °C), Min:97 8 °F (36 6 °C), Max:98 5 °F (36 9 °C)    Temp:  [97 8 °F (36 6 °C)-98 5 °F (36 9 °C)] 97 9 °F (36 6 °C)  HR:  [56-70] 56  Resp:  [16-18] 18  BP: (118-142)/(65-93) 139/75  SpO2:  [93 %-99 %] 97 %  Body mass index is 41 65 kg/m²  Input and Output Summary (last 24 hours): Intake/Output Summary (Last 24 hours) at 3/9/2022 1336  Last data filed at 3/9/2022 1146  Gross per 24 hour   Intake 840 ml   Output 1550 ml   Net -710 ml       Physical Exam:   Physical Exam  Vitals reviewed  Constitutional:       General: He is not in acute distress  Appearance: He is not toxic-appearing  HENT:      Head: Normocephalic and atraumatic  Eyes:      Extraocular Movements: Extraocular movements intact  Cardiovascular:      Rate and Rhythm: Normal rate and regular rhythm  Pulmonary:      Effort: Pulmonary effort is normal       Breath sounds: Normal breath sounds  Abdominal:      General: Bowel sounds are normal  There is no distension  Palpations: Abdomen is soft  Tenderness: There is no abdominal tenderness  Musculoskeletal:         General: Normal range of motion  Cervical back: Normal range of motion  Neurological:      General: No focal deficit present  Mental Status: He is alert and oriented to person, place, and time  Psychiatric:         Mood and Affect: Mood normal          Behavior: Behavior normal          Thought Content: Thought content normal          Additional Data:     Labs:  Results from last 7 days   Lab Units 03/08/22  0611 03/05/22  0536 03/04/22  0557   WBC Thousand/uL 9 68   < > 8 55   HEMOGLOBIN g/dL 11 2*   < > 10 9*   HEMATOCRIT % 33 7*   < > 32 8*   PLATELETS Thousands/uL 211   < > 228   NEUTROS PCT %  --   --  85*   LYMPHS PCT %  --   --  8*   MONOS PCT %  --   --  6   EOS PCT %  --   --  0    < > = values in this interval not displayed       Results from last 7 days   Lab Units 03/08/22  0611   SODIUM mmol/L 138   POTASSIUM mmol/L 3 6   CHLORIDE mmol/L 102   CO2 mmol/L 30   BUN mg/dL 28*   CREATININE mg/dL 1 36*   ANION GAP mmol/L 6   CALCIUM mg/dL 7 3*   GLUCOSE RANDOM mg/dL 133         Results from last 7 days   Lab Units 03/09/22  1042 03/09/22  0609 03/08/22  2052 03/08/22  1623 03/08/22  1030 03/08/22  0713 03/07/22  2054 03/07/22  1537 03/07/22  1041 03/07/22  0631 03/06/22  2108 03/06/22  1536   POC GLUCOSE mg/dl 135 106 190* 214* 211* 129 227* 232* 178* 116 115 311*     Results from last 7 days   Lab Units 03/07/22  0539   HEMOGLOBIN A1C % 6 3*           Lines/Drains:  Invasive Devices  Report    Peripheral Intravenous Line            Peripheral IV 03/08/22 Left Forearm <1 day                  Telemetry:  Telemetry Orders (From admission, onward)             48 Hour Telemetry Monitoring  Continuous x 48 hours        References:    Telemetry Guidelines   Question:  Reason for 48 Hour Telemetry  Answer:  Acute Decompensated CHF (continuous diuretic infusion or total diuretic dose > 200 mg daily, associated electrolyte derangement, ionotropic drip, history of ventricular arrhythmia, or new EF <35%)                 Telemetry Reviewed: Normal Sinus Rhythm  Indication for Continued Telemetry Use: Acute CHF on >200 mg lasix/day or equivalent dose or with new reduced EF  Imaging: No pertinent imaging reviewed      Recent Cultures (last 7 days):         Last 24 Hours Medication List:   Current Facility-Administered Medications   Medication Dose Route Frequency Provider Last Rate    acetaminophen  650 mg Oral Q6H PRN Claritza Resendez MD      albuterol  2 puff Inhalation Q4H PRN Claritza Resendez MD      albuterol  2 puff Inhalation 4x Daily Rain Ma MD      aluminum-magnesium hydroxide-simethicone  30 mL Oral Q6H PRN Claritza Resendez MD      apixaban  5 mg Oral BID Tiffany Stone DO      atorvastatin  10 mg Oral Daily Claritza Resendez MD      budesonide-formoterol  2 puff Inhalation BID Claritza Resendez MD      docusate sodium  100 mg Oral BID Claritza Resendez MD      guaiFENesin  600 mg Oral BID Claritza Resendez MD      insulin glargine  14 Units Subcutaneous HS Rain Ma MD      insulin lispro  1-6 Units Subcutaneous TID AC Jeff Meza MD      insulin lispro  1-6 Units Subcutaneous HS Jeff Meza MD      insulin lispro  3 Units Subcutaneous TID With Meals Estevan Martinez MD      melatonin  3 mg Oral HS PRN Estevan Martinez MD      metoprolol succinate  50 mg Oral Daily Emily Aly PA-C      montelukast  10 mg Oral HS Devang Ingram MD      ondansetron  4 mg Intravenous Q6H PRN Jeff Meza MD      oxyCODONE-acetaminophen  1 tablet Oral Q4H PRN Jeff Meza MD      potassium chloride  20 mEq Oral BID Jeff Meza MD      [START ON 3/11/2022] predniSONE  10 mg Oral Daily Estevan Martinez MD      predniSONE  20 mg Oral Daily Estevan Martinez MD      tiotropium  18 mcg Inhalation Daily Jeff Meza MD          Today, Patient Was Seen By: Stefani Chase PA-C    **Please Note: This note may have been constructed using a voice recognition system  **

## 2022-03-10 VITALS
WEIGHT: 309.31 LBS | BODY MASS INDEX: 41.89 KG/M2 | OXYGEN SATURATION: 97 % | HEIGHT: 72 IN | DIASTOLIC BLOOD PRESSURE: 63 MMHG | HEART RATE: 65 BPM | SYSTOLIC BLOOD PRESSURE: 120 MMHG | TEMPERATURE: 98 F | RESPIRATION RATE: 20 BRPM

## 2022-03-10 LAB
ANION GAP SERPL CALCULATED.3IONS-SCNC: 4 MMOL/L (ref 4–13)
BUN SERPL-MCNC: 26 MG/DL (ref 5–25)
CALCIUM SERPL-MCNC: 8 MG/DL (ref 8.3–10.1)
CHLORIDE SERPL-SCNC: 105 MMOL/L (ref 100–108)
CO2 SERPL-SCNC: 30 MMOL/L (ref 21–32)
CREAT SERPL-MCNC: 1.23 MG/DL (ref 0.6–1.3)
GFR SERPL CREATININE-BSD FRML MDRD: 66 ML/MIN/1.73SQ M
GLUCOSE SERPL-MCNC: 106 MG/DL (ref 65–140)
GLUCOSE SERPL-MCNC: 109 MG/DL (ref 65–140)
GLUCOSE SERPL-MCNC: 222 MG/DL (ref 65–140)
POTASSIUM SERPL-SCNC: 3.8 MMOL/L (ref 3.5–5.3)
SODIUM SERPL-SCNC: 139 MMOL/L (ref 136–145)

## 2022-03-10 PROCEDURE — 82948 REAGENT STRIP/BLOOD GLUCOSE: CPT

## 2022-03-10 PROCEDURE — 99239 HOSP IP/OBS DSCHRG MGMT >30: CPT | Performed by: PHYSICIAN ASSISTANT

## 2022-03-10 PROCEDURE — 80048 BASIC METABOLIC PNL TOTAL CA: CPT | Performed by: INTERNAL MEDICINE

## 2022-03-10 PROCEDURE — 99232 SBSQ HOSP IP/OBS MODERATE 35: CPT | Performed by: INTERNAL MEDICINE

## 2022-03-10 RX ORDER — PREDNISONE 10 MG/1
10 TABLET ORAL DAILY
Qty: 2 TABLET | Refills: 0 | Status: SHIPPED | OUTPATIENT
Start: 2022-03-11 | End: 2022-03-14 | Stop reason: ALTCHOICE

## 2022-03-10 RX ORDER — BUMETANIDE 1 MG/1
1 TABLET ORAL 2 TIMES DAILY
Qty: 8 TABLET | Refills: 0 | Status: SHIPPED | OUTPATIENT
Start: 2022-03-10 | End: 2022-03-14 | Stop reason: SDUPTHER

## 2022-03-10 RX ORDER — METOPROLOL SUCCINATE 50 MG/1
50 TABLET, EXTENDED RELEASE ORAL DAILY
Qty: 90 TABLET | Refills: 1 | Status: SHIPPED | OUTPATIENT
Start: 2022-03-10 | End: 2022-03-10

## 2022-03-10 RX ORDER — BUMETANIDE 1 MG/1
1 TABLET ORAL 2 TIMES DAILY
Status: DISCONTINUED | OUTPATIENT
Start: 2022-03-10 | End: 2022-03-10 | Stop reason: HOSPADM

## 2022-03-10 RX ORDER — METOPROLOL SUCCINATE 50 MG/1
50 TABLET, EXTENDED RELEASE ORAL DAILY
Qty: 4 TABLET | Refills: 0 | Status: SHIPPED | OUTPATIENT
Start: 2022-03-10 | End: 2022-03-14 | Stop reason: SDUPTHER

## 2022-03-10 RX ORDER — BUMETANIDE 1 MG/1
1 TABLET ORAL 2 TIMES DAILY
Qty: 180 TABLET | Refills: 1 | Status: SHIPPED | OUTPATIENT
Start: 2022-03-10 | End: 2022-03-10

## 2022-03-10 RX ADMIN — INSULIN LISPRO 3 UNITS: 100 INJECTION, SOLUTION INTRAVENOUS; SUBCUTANEOUS at 11:35

## 2022-03-10 RX ADMIN — GUAIFENESIN 600 MG: 600 TABLET, EXTENDED RELEASE ORAL at 08:33

## 2022-03-10 RX ADMIN — ALBUTEROL SULFATE 2 PUFF: 90 AEROSOL, METERED RESPIRATORY (INHALATION) at 11:37

## 2022-03-10 RX ADMIN — BUMETANIDE 1 MG: 1 TABLET ORAL at 08:34

## 2022-03-10 RX ADMIN — TIOTROPIUM BROMIDE 18 MCG: 18 CAPSULE ORAL; RESPIRATORY (INHALATION) at 08:32

## 2022-03-10 RX ADMIN — ATORVASTATIN CALCIUM 10 MG: 10 TABLET, FILM COATED ORAL at 08:33

## 2022-03-10 RX ADMIN — PREDNISONE 20 MG: 20 TABLET ORAL at 08:34

## 2022-03-10 RX ADMIN — INSULIN LISPRO 2 UNITS: 100 INJECTION, SOLUTION INTRAVENOUS; SUBCUTANEOUS at 11:35

## 2022-03-10 RX ADMIN — APIXABAN 5 MG: 5 TABLET, FILM COATED ORAL at 08:34

## 2022-03-10 RX ADMIN — POTASSIUM CHLORIDE 20 MEQ: 1500 TABLET, EXTENDED RELEASE ORAL at 08:34

## 2022-03-10 RX ADMIN — BUDESONIDE AND FORMOTEROL FUMARATE DIHYDRATE 2 PUFF: 160; 4.5 AEROSOL RESPIRATORY (INHALATION) at 08:32

## 2022-03-10 RX ADMIN — ALBUTEROL SULFATE 2 PUFF: 90 AEROSOL, METERED RESPIRATORY (INHALATION) at 08:32

## 2022-03-10 RX ADMIN — METOPROLOL SUCCINATE 50 MG: 50 TABLET, EXTENDED RELEASE ORAL at 08:33

## 2022-03-10 NOTE — DISCHARGE SUMMARY
[address] [facility]  Discharge-  ain 1965, 49 y o  male MRN: [id number]  Unit/Bed#: [facility] 510-01 Encounter: [id number]  Primary Care Provider: Gaynelle Sever, MD   Date and time admitted to hospital: 3/1/2022  6:05 AM    * Acute on chronic diastolic heart failure [facility])  Assessment & Plan  Wt Readings from Last 3 Encounters:   03/10/22 (!) 140 kg (309 lb 4 9 oz)   02/22/22 (!) 143 kg (315 lb 1 6 oz)   02/12/22 (!) 145 kg (319 lb)     · Multiple recent admissions with CHF exacerbation  · Patient on outpatient torsemide 40 mg b i d  which was increased recently  · Follows with Dr Chapincito Esquivel outpatient  · Not on home oxygen at baseline   · BNP elevated 805, CXR unremarkable  · S/p IV diuresis   · Discharge on PO Bumex 1 mg BID   · Cardiology input appreciated  S/p cardioMEMs implant on 3/9   · Monitor I/O, daily weights    Paroxysmal atrial fibrillation (HCC)  Assessment & Plan  · New diagnosis in 2/2022  · NSR at present  · Ct BB, Eliquis    Stage 3a chronic kidney disease [facility])  Assessment & Plan  Lab Results   Component Value Date    EGFR 66 03/10/2022    EGFR 58 03/08/2022    EGFR 58 03/07/2022    CREATININE 1 23 03/10/2022    CREATININE 1 36 (H) 03/08/2022    CREATININE 1 37 (H) 03/07/2022   · Baseline Cr appears to be between 1 3-1 7  · Cr stable at baseline  · Avoid nephrotoxins    Moderate asthma with acute exacerbation  Assessment & Plan  · Patient with history of moderate asthma presented to ER with complaint of increasing shortness of breath, hypoxia, diffuse wheezing  · Prednisone begun this am - taper  · Continue home inhaler Symbicort, Spiriva, Singulair, Albuterol p r n    · Respiratory protocol on board     VICKY (obstructive sleep apnea)  Assessment & Plan  · Has CPAP machine at home but not using it since a few months as it has been recalled  · Declines using hospital CPAP  · Awaiting new CPAP at home    Morbid obesity [facility])  Assessment & Plan  Body mass index is 41 95 kg/m²  · Lifestyle modification    Type 2 diabetes mellitus with hyperglycemia [facility])  Assessment & Plan  Lab Results   Component Value Date    HGBA1C 6 3 (H) 03/07/2022       Recent Labs     03/09/22  1042 03/09/22  1617 03/09/22  2039 03/10/22  0621   POCGLU 135 229* 360* 106       Blood Sugar Average: Last 72 hrs:  (P) 320 [id number]   · Hold home oral meds while inpatient  · Steroid induced hyperglycemia  · Ct Lantus 14 hs, Humalog 3 TID, SSI  · Diabetic diet  · Hypoglycemia protocol  · Monitor blood sugars and adjust insulin as needed - decrease dose as steroid tapered    Essential hypertension  Assessment & Plan  · Continue home medication metoprolol,   · Bumex as above  · BP acceptable      Medical Problems             Resolved Problems  Date Reviewed: 3/10/2022    None              Discharging Physician / Practitioner: Tk Grey PA-C  PCP: Kristie Garibay MD  Admission Date:   Admission Orders (From admission, onward)     Ordered        03/02/22 1306  Inpatient Admission  Once            03/01/22 0840  Place in Observation  Once                      Discharge Date: 03/10/22    Consultations During Hospital Stay:  · Cardiology/Heart Failure     Procedures Performed:   · CardioMEMs implanted on 3/9     Significant Findings / Test Results:   · Acute CHF     Incidental Findings:   · None     Test Results Pending at Discharge (will require follow up): · None     Outpatient Tests Requested:  · None    Complications:  None    Reason for Admission: acute on chronic CHF    Hospital Course:   Virgie Bland is a 49 y o  male patient who originally presented to the hospital on 3/1/2022 due to worsening shortness of breath, increased pedal edema  A few days prior his diuretic were increased but had worsening SOB prompting ER evaluation  He was admitted for CHF and asthma exacerbation  He was seen by heart failure and given IV diuretics with good response    He was given IV solu-medrol initially and is now on PO prednisone taper for asthma exacerbation  He is now on PO diuretics (changed to Bumex 1 mg BID) and doing well  He had cardioMEMs implanted on 3/8, he tolerated this well with no complications  He will need close cardiology follow up on discharge  Please see above list of diagnoses and related plan for additional information  Condition at Discharge: good    Discharge Day Visit / Exam:   Subjective: The patient has no acute complaints at this time, feels well, eager to go home  Vitals: Blood Pressure: 120/63 (03/10/22 0833)  Pulse: 65 (03/10/22 0833)  Temperature: 98 °F (36 7 °C) (03/10/22 0723)  Temp Source: Oral (03/10/22 0723)  Respirations: 20 (03/10/22 0723)  Height: 6' (182 9 cm) (03/01/22 5544)  Weight - Scale: (!) 140 kg (309 lb 4 9 oz) (03/10/22 0600)  SpO2: 97 % (03/10/22 0723)  Exam:   Physical Exam  Vitals reviewed  Constitutional:       General: He is not in acute distress  Appearance: He is not toxic-appearing  HENT:      Head: Normocephalic and atraumatic  Eyes:      Extraocular Movements: Extraocular movements intact  Pulmonary:      Effort: Pulmonary effort is normal  No respiratory distress  Musculoskeletal:         General: Normal range of motion  Cervical back: Normal range of motion  Neurological:      General: No focal deficit present  Mental Status: He is alert and oriented to person, place, and time  Psychiatric:         Mood and Affect: Mood normal          Behavior: Behavior normal          Thought Content: Thought content normal           Discussion with Family: Patient declined call to   Discharge instructions/Information to patient and family:   See after visit summary for information provided to patient and family  Provisions for Follow-Up Care:  See after visit summary for information related to follow-up care and any pertinent home health orders         Disposition:   Home    Planned Readmission: no     Discharge Statement:  I spent 35 minutes discharging the patient  This time was spent on the day of discharge  I had direct contact with the patient on the day of discharge  Greater than 50% of the total time was spent examining patient, answering all patient questions, arranging and discussing plan of care with patient as well as directly providing post-discharge instructions  Additional time then spent on discharge activities  Discharge Medications:  See after visit summary for reconciled discharge medications provided to patient and/or family        **Please Note: This note may have been constructed using a voice recognition system**

## 2022-03-10 NOTE — ASSESSMENT & PLAN NOTE
Wt Readings from Last 3 Encounters:   03/10/22 (!) 140 kg (309 lb 4 9 oz)   02/22/22 (!) 143 kg (315 lb 1 6 oz)   02/12/22 (!) 145 kg (319 lb)     · Multiple recent admissions with CHF exacerbation  · Patient on outpatient torsemide 40 mg b i d  which was increased recently  · Follows with Dr Selena Walsh outpatient  · Not on home oxygen at baseline   · BNP elevated 805, CXR unremarkable  · S/p IV diuresis   · Discharge on PO Bumex 1 mg BID   · Cardiology input appreciated    S/p cardioMEMs implant on 3/9   · Monitor I/O, daily weights

## 2022-03-10 NOTE — PROGRESS NOTES
General Cardiology Outpatient Progress Note    Carli Henriquez 49 y o  male   MRN: [id number]  Encounter: [id number]    Assessment:  Patient Active Problem List    Diagnosis Date Noted    Paroxysmal atrial fibrillation ([place] [address] ) 03/01/2022    Stage 3a chronic kidney disease ([place] [address] ) 12/23/2021    Hypokalemia 11/14/2021    Acute on chronic diastolic heart failure ([place] [address] ) 11/12/2021    Moderate asthma with acute exacerbation 10/11/2021    Dyspnea on exertion 10/11/2021    Hyponatremia 07/31/2021    Cardiomyopathy ([place] [address] ) 07/19/2021    Well adult exam 05/14/2021    Decreased left ventricular systolic function 26/16/4995    Bilateral leg edema 02/19/2020    Edema of both feet 01/23/2020    VICKY (obstructive sleep apnea)     Daytime sleepiness 07/17/2019    Mixed hyperlipidemia 04/18/2019    Morbid obesity ([place] [address] ) 04/18/2019    Vitamin B12 deficiency 04/18/2019    Vitamin D deficiency 04/18/2019    Knee sprain, bilateral 06/14/2018    Primary osteoarthritis of both knees 06/14/2018    Irritable bowel syndrome with diarrhea 01/30/2018    Essential hypertension 01/30/2018    Type 2 diabetes mellitus with hyperglycemia ([place] [address] ) 01/30/2018       Today's Plan:  · Continue current medications  · Refills sent to pharmacy as requested   To complete blood work in 7-10 days  Plan:  Chronic HFpEF; LVEF 55%; LVIDd 6 0 cm; NYHA II; ACC/AHA Stage B   TTE 03/25/2020: LVEF 40-45%  LVIDd 6 12 cm  Normal RV  TTE 07/19/2021: LVEF 50%  LVIDd 6 13 cm  Grade 1 DD  Normal RV  Trace MR and TR  Mildly dilated IVC  TTE 11/12/2021: LVEF 55%  LVIDd 6 0 cm  Grade 1 DD  Normal RV  Trace TR     RHC 03/09/2022 (at time of CardioMEMS implant): RA 4  RV 35/4  PA 35/12/21  PCWP 10  PA sat 64%  CO/CI by Travis 5 56/2 2  PVR 1 97  Reviewed importance of low sodium diet and fluid restriction  Weight of 309 lbs on 03/10 (day of discharge)  Today, weighs 317 lbs      Most recent BMP from 03/10/2022: sodium 139; potassium 3 8; BUN 26; creatinine 1 23; eGFR 66  Neurohormonal Blockade:  --Beta Blocker: metoprolol succinate 50 mg daily  --ARNi / ACEi / ARB: No    --Aldosterone Antagonist: No    --SGLT2 Inhibitor: No   --Diuretic: Bumex 1 mg BID with potassium 20 mEq BID  Sudden Cardiac Death Risk Reduction:  --LVEF >35%  Electrical Resynchronization:  --Candidacy for BiV device: narrow QRS  Advanced Therapies: Will continue to monitor  --CardioMEMS implanted 03/09/2022  Atrial fibrillation   [id number] = 3 (HF, HTN, DM)  Diagnosed 02/2022  Anticoagulation on Eliquis  Rate control: BB as above  Rhythm control: No     Hypertension   BP of 118/64 mmHg in office today  Continue on medications as above  Hyperlipidemia   Most recent lipid panel from 10/01/2021: cholesterol 157; TG 90; HDL 66; calculated LDL 73  Continue on atorvastatin 10 mg daily  Diabetes mellitus, type II     Non-insulin dependent  Most recent hemoglobin A1c of 6 3 from 03/07/2022  Obstructive sleep apnea: awaiting new CPAP from company; device was recalled  Chronic respiratory failure  Asthma, moderate persistent   S/p gastric bypass (Samuel-en-Y)surgery   History of tobacco abuse  Carpal tunnel syndrome, bilateral     HPI:   Jonathon Jj is a 57-year-old man with a PMH as above who presents to the office for follow-up      12/16/2021 with RODRIGUE Pop: "Presents for 1 month follow up  Over past week has noted increased TELLES, orthopnea and wheezing  Cannot differentiate between heart failure symptoms and asthma  Wife feels his lower extremity edema is worse although his weight is actually down  Already finished steroid taper  Has been using his inhaler at least Q4 hours over the past week  No CP, dizziness, syncope "    Admitted to [facility] from 12/23 to 01/01/2022 after presenting with worsening SOB  Started on IV Lasix (later switched to IV Bumex) and IV steroids  Did receive 2 doses of metolazone while inpatient  Transitioned to PO torsemide on day of discharge  Lost 12 lbs and net negative 15 L this admission  Presented to [facility] ED on 02/12/2022 with worsening SOB  Diagnosed with Afib and was started on Eliquis and BB  Also received 80 mg IV Lasix, and was discharged home  02/22/2022: Patient presents for hospital follow-up  Has been feeling "good" until yesterday  Developed TELLES and noted orthopnea overnight  Also with recently worsening of cough resulting in increased use of PRN inhalers/nebulizers  Denies recent dietary indiscretion  Drinking no more than 2000 mL daily  Is completing daily weights; reports down-trending weights over past few days  Has noted slight decrease in response to PO torsemide  Admitted to [facility] from 03/01 to 03/10/2022 after presenting with worsening SOB and LE swelling  Started on IV Lasix and IV steroids  Later was switched to IV Bumex on 03/04  Transitioned to PO Bumex on 03/07  CardioMEMS implanted on 03/09  Lost 7 lbs and net negative 8 1 L this admission  03/14/2022: Patient presents for hospital follow-up  No current complaints  Weights stable at home in low 310s lbs range  No issues with CardioMEMS system at home  Still waiting for new CPAP machine  Past Medical History:   Diagnosis Date    Acute gastritis without hemorrhage     last assessed 3/24/17    Asthma     Diabetes mellitus ([facility] [address] )     Gastric bypass status for obesity     Hyperlipidemia     Hypertension     Impaired fasting glucose     last assessed 5/10/17    Obesity     Viral gastroenteritis     last assessed 11/4/16       Review of Systems   Constitutional: Negative for activity change, appetite change, fatigue, fever and unexpected weight change  HENT: Negative for congestion, postnasal drip, rhinorrhea, sneezing, sore throat and trouble swallowing  Eyes: Negative  Respiratory: Negative for cough, chest tightness and shortness of breath      Cardiovascular: Negative for chest pain, palpitations and leg swelling  Gastrointestinal: Negative for abdominal distention, abdominal pain, diarrhea, nausea and vomiting  Endocrine: Negative  Genitourinary: Negative for decreased urine volume, difficulty urinating, dysuria, frequency and urgency  Musculoskeletal: Negative  Skin: Negative  Allergic/Immunologic: Negative  Neurological: Negative for dizziness, tremors, syncope, weakness, light-headedness and headaches  Hematological: Negative  Psychiatric/Behavioral: Negative for agitation, confusion and sleep disturbance  The patient is not nervous/anxious  14-point ROS completed and negative except as stated above and/or in the HPI      Allergies   Allergen Reactions    Azithromycin Other (See Comments)     Shaky, uneasy feeling     Bactrim [Sulfamethoxazole-Trimethoprim] Other (See Comments)     shakiness         Current Outpatient Medications:     Accu-Chek FastClix Lancets MISC, Test blood sugar twice daily, Disp: 200 each, Rfl: 3    albuterol (PROVENTIL HFA,VENTOLIN HFA) 90 mcg/act inhaler, Inhale 2 puffs every 4 (four) hours as needed for wheezing or shortness of breath, Disp: 8 g, Rfl: 3    apixaban (Eliquis) 5 mg, Take 1 tablet (5 mg total) by mouth 2 (two) times a day, Disp: 60 tablet, Rfl: 0    atorvastatin (LIPITOR) 10 mg tablet, TAKE 1 TABLET BY MOUTH EVERY DAY, Disp: 30 tablet, Rfl: 5    Blood Glucose Monitoring Suppl (Accu-Chek Guide) w/Device KIT, Use 2 (two) times a day Test blood sugar twice daily, Disp: 1 kit, Rfl: 0    budesonide-formoterol (Symbicort) 160-4 5 mcg/act inhaler, Inhale 2 puffs 2 (two) times a day Rinse mouth after use , Disp: 10 2 g, Rfl: 2    glucose blood (Accu-Chek Guide) test strip, Test blood sugar twice daily, Disp: 200 strip, Rfl: 3    guaiFENesin (MUCINEX) 600 mg 12 hr tablet, Take 1 tablet (600 mg total) by mouth 2 (two) times a day, Disp: 60 tablet, Rfl: 0    levalbuterol (Xopenex) 1 25 mg/3 mL nebulizer solution, Take 3 mL (1 25 mg total) by nebulization 3 (three) times a day (Patient taking differently: Take 1 25 mg by nebulization as needed  ), Disp: 270 mL, Rfl: 3    metFORMIN (GLUCOPHAGE) 500 mg tablet, Take 1 tablet (500 mg total) by mouth 2 (two) times a day After meals, Disp: 60 tablet, Rfl: 6    montelukast (SINGULAIR) 10 mg tablet, Take 1 tablet (10 mg total) by mouth daily at bedtime, Disp: 90 tablet, Rfl: 1    MULTIPLE VITAMINS-CALCIUM PO, Take 1 tablet by mouth daily, Disp: , Rfl:     potassium chloride (K-DUR,KLOR-CON) 10 mEq tablet, Take 2 tablets (20 mEq total) by mouth 2 (two) times a day, Disp: 360 tablet, Rfl: 1    sitaGLIPtin (Januvia) 100 mg tablet, Take 1 tablet (100 mg total) by mouth daily, Disp: 30 tablet, Rfl: 1    sodium chloride (OCEAN) 0 65 % nasal spray, 1 spray into each nostril 3 (three) times a day, Disp: 30 mL, Rfl: 0    tiotropium (Spiriva Respimat) 1 25 MCG/ACT AERS inhaler, Inhale 2 puffs daily, Disp: 4 g, Rfl: 3    tobramycin (TOBREX) 0 3 % SOLN, Administer 1 drop to both eyes every 4 (four) hours while awake (Patient taking differently: Administer 1 drop to both eyes as needed  ), Disp: 5 mL, Rfl: 0    bumetanide (BUMEX) 1 mg tablet, Take 1 tablet (1 mg total) by mouth 2 (two) times a day, Disp: 180 tablet, Rfl: 3    metoprolol succinate (TOPROL-XL) 50 mg 24 hr tablet, Take 1 tablet (50 mg total) by mouth daily, Disp: 90 tablet, Rfl: 3    Social History     Socioeconomic History    Marital status: /Civil Union     Spouse name: Not on file    Number of children: Not on file    Years of education: Not on file    Highest education level: Not on file   Occupational History    Not on file   Tobacco Use    Smoking status: Light Tobacco Smoker     Types: Cigars    Smokeless tobacco: Current User    Tobacco comment: rare/hasn't smoked in 1 year   Vaping Use    Vaping Use: Never used   Substance and Sexual Activity    Alcohol use:  Yes     Alcohol/week: 1 0 standard drink Types: 1 Standard drinks or equivalent per week     Comment: social    Drug use: No    Sexual activity: Yes     Partners: Female     Birth control/protection: None   Other Topics Concern    Not on file   Social History Narrative    Not on file     Social Determinants of Health     Financial Resource Strain: Not on file   Food Insecurity: No Food Insecurity    Worried About Running Out of Food in the Last Year: Never true    Aria of Food in the Last Year: Never true   Transportation Needs: No Transportation Needs    Lack of Transportation (Medical): No    Lack of Transportation (Non-Medical): No   Physical Activity: Not on file   Stress: Not on file   Social Connections: Not on file   Intimate Partner Violence: Not on file   Housing Stability: Unknown    Unable to Pay for Housing in the Last Year: No    Number of Places Lived in the Last Year: Not on file    Unstable Housing in the Last Year: No     Family History   Problem Relation Age of Onset    Stroke Mother     Hypertension Father        Vitals:   Blood pressure 118/64, pulse 72, height 6' (1 829 m), weight (!) 144 kg (317 lb 8 oz), SpO2 98 %  Body mass index is 43 06 kg/m²  Wt Readings from Last 3 Encounters:   03/14/22 (!) 144 kg (317 lb 8 oz)   03/10/22 (!) 140 kg (309 lb 4 9 oz)   02/22/22 (!) 143 kg (315 lb 1 6 oz)     Vitals:    03/14/22 0940   BP: 118/64   BP Location: Right arm   Patient Position: Sitting   Cuff Size: Standard   Pulse: 72   SpO2: 98%   Weight: (!) 144 kg (317 lb 8 oz)   Height: 6' (1 829 m)       Physical Exam  Vitals reviewed  Constitutional:       General: He is not in acute distress  Appearance: Normal appearance  He is well-developed  HENT:      Head: Normocephalic  Nose: Nose normal       Mouth/Throat:      Mouth: Mucous membranes are moist    Eyes:      General: No scleral icterus  Conjunctiva/sclera: Conjunctivae normal    Neck:      Vascular: No JVD  Trachea: No tracheal deviation  Cardiovascular:      Rate and Rhythm: Normal rate and regular rhythm  No extrasystoles are present  Pulses: Normal pulses  Heart sounds: No murmur heard  Pulmonary:      Effort: Pulmonary effort is normal  No tachypnea, bradypnea or respiratory distress  Breath sounds: Normal air entry  No decreased air movement  No decreased breath sounds or rales  Abdominal:      General: Bowel sounds are normal  There is no distension  Palpations: Abdomen is soft  Tenderness: There is no abdominal tenderness  Musculoskeletal:      Cervical back: Neck supple  Right lower leg: Edema (trace) present  Left lower leg: Edema (trace) present  Skin:     General: Skin is warm and dry  Coloration: Skin is pale  Skin is not jaundiced  Neurological:      General: No focal deficit present  Mental Status: He is alert and oriented to person, place, and time  Psychiatric:         Attention and Perception: Attention normal          Mood and Affect: Mood and affect normal          Speech: Speech normal          Behavior: Behavior normal  Behavior is cooperative  Thought Content:  Thought content normal      Labs & Results:  Lab Results   Component Value Date    WBC 9 68 03/08/2022    HGB 11 2 (L) 03/08/2022    HCT 33 7 (L) 03/08/2022    MCV 91 03/08/2022     03/08/2022     Lab Results   Component Value Date    SODIUM 139 03/10/2022    K 3 8 03/10/2022     03/10/2022    CO2 30 03/10/2022    BUN 26 (H) 03/10/2022    CREATININE 1 23 03/10/2022    GLUC 109 03/10/2022    CALCIUM 8 0 (L) 03/10/2022     No results found for: INR, PROTIME     Lab Results   Component Value Date    NTBNP 805 (H) 03/01/2022      Junieariana Tadeo PA-C

## 2022-03-10 NOTE — ASSESSMENT & PLAN NOTE
Lab Results   Component Value Date    EGFR 66 03/10/2022    EGFR 58 03/08/2022    EGFR 58 03/07/2022    CREATININE 1 23 03/10/2022    CREATININE 1 36 (H) 03/08/2022    CREATININE 1 37 (H) 03/07/2022   · Baseline Cr appears to be between 1 3-1 7  · Cr stable at baseline  · Avoid nephrotoxins

## 2022-03-10 NOTE — NURSING NOTE
Discharge reviewed with patient  Patient aware and agreeable  Scripts sent to [facility]  Patient discharged home

## 2022-03-10 NOTE — UTILIZATION REVIEW
Notification of Discharge   This is a Notification of Discharge from our facility [address]  Please be advised that this patient has been discharge from our facility  Below you will find the admission and discharge date and time including the patients disposition  UTILIZATION REVIEW CONTACT:  Debbi Osborn  Utilization   Network Utilization Review Department  Phone: [phone] x carefully listen to the prompts  All voicemails are confidential   Email: [email or web address]     PHYSICIAN ADVISORY SERVICES:  FOR RLUY-CY-SRPQ REVIEW - MEDICAL NECESSITY DENIAL  Phone: [phone]  Fax: [phone]  Email: [email or web address]     PRESENTATION DATE: 3/1/2022  6:05 AM  OBERVATION ADMISSION DATE:   INPATIENT ADMISSION DATE: 3/2/22  1:06 PM   DISCHARGE DATE: 3/10/2022  2:40 PM  DISPOSITION: Home/Self Care Home/Self Care      IMPORTANT INFORMATION:  Send all requests for admission clinical reviews, approved or denied determinations and any other requests to dedicated fax number below belonging to the campus where the patient is receiving treatment   List of dedicated fax numbers:  [address] DENIALS (Administrative/Medical Necessity) [phone]   [address] (Maternity/NICU/Pediatrics) [phone]   [facility] [phone]   [address] [facility] [phone]   [address] [place] [address] [phone]   [address] [phone]   [facility]  [phone]   [address] [facility] 1000 [facility] [phone]

## 2022-03-10 NOTE — PROGRESS NOTES
Heart failure Progress note  Unit/Bed#: [facility] 510-01 Encounter: [id number]        Yonas Rivero 49 y o  male [id number]  Hospital Stay Days: 8    Assessment and Plan      Current Problem List   Principal Problem:    Acute on chronic diastolic heart failure [facility])  Active Problems:    Essential hypertension    Type 2 diabetes mellitus with hyperglycemia (HCC)    Morbid obesity (HCC)    VICKY (obstructive sleep apnea)    Moderate asthma with acute exacerbation    Stage 3a chronic kidney disease (HCC)    Paroxysmal atrial fibrillation (HCC)    Assessment/Plan:  1   Acute on chronic diastolic CHF - likely secondary to asthma + VICKY superimposed CKD stage 3 and PAF  ACC/AHA stage C  NYHA class III  Home regimen includes torsemide 20 mg BID  Also on metoprolol  Frequent exacerbations secondary to asthma  Superimposed asthma exacerbation this admission  ? Continue Metoprolol 25 mg daily  ? Continue PO Bumex at 1 mg bid for outpatient dosing  ? Monitor I and O  ? Daily weights  ? Cont  Atorvastatin  ? Salt and fluid restriction  ? S/P cardioMEM     ? Outpatient follow up scheduled  2   PAF  ?  Cont  Eliquis + Metoprolol  ?    ·     Subjective     Patient seen and examined  No acute events overnight  Patient denies any complaints  Creatine is 1 23 today  Down from 1 36 yesterday  Weight is 140 kg today  139 yesterday - did have his bumex held yesterday though  No shortness of breath  Appears well     Objective     Vitals: Temp (24hrs), Av 9 °F (36 6 °C), Min:97 5 °F (36 4 °C), Max:98 4 °F (36 9 °C)  Current: Temperature: 98 °F (36 7 °C)  Patient Vitals for the past 24 hrs:   BP Temp Temp src Pulse Resp SpO2 Weight   03/10/22 0833 120/63 -- -- 65 -- -- --   03/10/22 0723 (!) 114/47 98 °F (36 7 °C) Oral 64 20 97 % --   03/10/22 0600 -- -- -- -- -- -- (!) 140 kg (309 lb 4 9 oz)   03/10/22 0209 104/54 97 5 °F (36 4 °C) Oral 58 18 96 % --   22 2253 110/57 97 6 °F (36 4 °C) Oral 60 18 96 % --   22 1853 122/67 98 4 °F (36 9 °C) -- 75 18 94 % --   03/09/22 1524 113/64 98 2 °F (36 8 °C) Oral 66 18 94 % --   03/09/22 0952 139/75 -- -- 56 -- 97 % --   03/09/22 0952 139/75 -- -- -- -- -- --    Body mass index is 41 95 kg/m²  Physical Exam:  GENERAL: NAD  HEENT:  NC/A  CARDIAC:  RRR, +S1/S2, no S3/S4 heard, no m/g/r  PULMONARY:  CTA B/L, no wheezing/rales/rhonci, non-labored breathing  ABDOMEN:  ND  Extremities:  No edema  NEUROLOGIC: Grossly intact  SKIN:  No rashes or erythema noted on exposed skin     Psych: Normal affect    Invasive Devices  Report    Peripheral Intravenous Line            Peripheral IV 03/08/22 Left Forearm 1 day                    Labs:   Results from last 7 days   Lab Units 03/08/22  0611 03/05/22  0536 03/04/22  0557   WBC Thousand/uL 9 68 8 10 8 55   HEMOGLOBIN g/dL 11 2* 10 2* 10 9*   HEMATOCRIT % 33 7* 32 0* 32 8*   PLATELETS Thousands/uL 211 183 228   NEUTROS PCT %  --   --  85*   MONOS PCT %  --   --  6      Results from last 7 days   Lab Units 03/10/22  0437 03/08/22  0611 03/07/22  0539 03/06/22  0539 03/05/22  0536 03/04/22  0557   SODIUM mmol/L 139 138 138 136 136 136   POTASSIUM mmol/L 3 8 3 6 3 9 4 2 4 2 4 1   CHLORIDE mmol/L 105 102 103 102 101 99*   CO2 mmol/L 30 30 33* 30 31 31   BUN mg/dL 26* 28* 28* 25 26* 24   CREATININE mg/dL 1 23 1 36* 1 37* 1 26 1 29 1 43*   CALCIUM mg/dL 8 0* 7 3* 7 1* 7 2* 7 4* 7 1*   MAGNESIUM mg/dL  --   --   --   --   --  2 1   EGFR ml/min/1 73sq m 66 58 58 64 62 55                 No results found for: PHART, ABK5FST, PO2ART, ZDU7YUP, T3BUWGQR, BEART, SOURCE  No components found for: HIV1X2  Lab Results   Component Value Date    HEPCAB Non-reactive 04/23/2019     No results found for: SPEP, UPEP   Lab Results   Component Value Date    HGBA1C 6 3 (H) 03/07/2022    HGBA1C 7 0 (H) 11/10/2021    HGBA1C 7 1 (H) 07/18/2021     No results found for: CHOL   Lab Results   Component Value Date    HDL 66 10/01/2021    HDL 46 01/28/2020    HDL 40 04/23/2019 Lab Results   Component Value Date    LDLCALC 73 10/01/2021    LDLCALC 144 (H) 01/28/2020    LDLCALC 131 (H) 04/23/2019      Lab Results   Component Value Date    TRIG 90 10/01/2021    TRIG 126 01/28/2020    TRIG 126 04/23/2019     No components found for: PROCAL      Micro:      Urinalysis:  No results found for: AMPHETUR, BDZUR, COCAINEUR, OPIATEUR, PCPUR, THCUR, ETOH, ACTMNPHEN, SALICYLATE       Invalid input(s): URIBILINOGEN        Intake and Outputs:  I/O       03/08 0701 03/09 0700 03/09 0701  03/10 0700 03/10 0701  03/11 0700    P  O  660 1020     Total Intake(mL/kg) 660 (4 7) 1020 (7 3)     Urine (mL/kg/hr) 2550 (0 8) 1100 (0 3)     Total Output 2550 1100     Net -1890 -80                Nutrition:  Diet Roman/CHO Controlled; Consistent Carbohydrate Diet Level 2 (5 carb servings/75 grams CHO/meal); Fluid Restriction 1500 ML, Sodium 2 GM  Radiology Results:   XR chest 1 view portable   Final Result by Manuel Hodges MD (03/01 1208)      No acute cardiopulmonary disease  Please refer to prior chest CT report for follow-up recommendations                 Workstation performed: [id number]           Scheduled Medications:  albuterol, 2 puff, 4x Daily  apixaban, 5 mg, BID  atorvastatin, 10 mg, Daily  budesonide-formoterol, 2 puff, BID  bumetanide, 1 mg, BID  docusate sodium, 100 mg, BID  guaiFENesin, 600 mg, BID  insulin glargine, 14 Units, HS  insulin lispro, 1-6 Units, TID AC  insulin lispro, 1-6 Units, HS  insulin lispro, 3 Units, TID With Meals  metoprolol succinate, 50 mg, Daily  montelukast, 10 mg, HS  potassium chloride, 20 mEq, BID  [START ON 3/11/2022] predniSONE, 10 mg, Daily  tiotropium, 18 mcg, Daily      PRN MEDS:  acetaminophen, 650 mg, Q6H PRN  albuterol, 2 puff, Q4H PRN  aluminum-magnesium hydroxide-simethicone, 30 mL, Q6H PRN  melatonin, 3 mg, HS PRN  ondansetron, 4 mg, Q6H PRN  oxyCODONE-acetaminophen, 1 tablet, Q4H PRN      Last 24 Hour Meds: :   Medication Administration - last 24 hours from 03/09/2022 0835 to 03/10/2022 0835       Date/Time Order Dose Route Action Action by     03/10/2022 0833 atorvastatin (LIPITOR) tablet 10 mg 10 mg Oral Given Torrie Mayorga, RN     03/09/2022 0951 atorvastatin (LIPITOR) tablet 10 mg 10 mg Oral Given Alesha Benjamin, KERA     03/10/2022 8413 budesonide-formoterol (SYMBICORT) 160-4 5 mcg/act inhaler 2 puff 2 puff Inhalation Given Torrie Mayorga, RN     03/09/2022 1723 budesonide-formoterol (SYMBICORT) 160-4 5 mcg/act inhaler 2 puff 2 puff Inhalation Given Janeal Dec, RN     03/09/2022 0954 budesonide-formoterol (SYMBICORT) 160-4 5 mcg/act inhaler 2 puff 2 puff Inhalation Given Alesha Benjamin, RN     03/10/2022 0833 guaiFENesin (MUCINEX) 12 hr tablet 600 mg 600 mg Oral Given Torrie Hemichel, RN     03/09/2022 1722 guaiFENesin (MUCINEX) 12 hr tablet 600 mg 600 mg Oral Given Janeal Dec, RN     03/09/2022 1277 guaiFENesin (MUCINEX) 12 hr tablet 600 mg 600 mg Oral Given Alesha Benjamin, RN     03/09/2022 2149 montelukast (SINGULAIR) tablet 10 mg 10 mg Oral Given Chiquita Waller, RN     03/10/2022 0834 potassium chloride (K-DUR,KLOR-CON) CR tablet 20 mEq 20 mEq Oral Given Torrie Mayorga, RN     03/09/2022 1722 potassium chloride (K-DUR,KLOR-CON) CR tablet 20 mEq 20 mEq Oral Given Janeal Dec, RN     03/09/2022 3393 potassium chloride (K-DUR,KLOR-CON) CR tablet 20 mEq 20 mEq Oral Given Aelsha Benjamin, RN     03/10/2022 0832 tiotropium (SPIRIVA) capsule for inhaler 18 mcg 18 mcg Inhalation Given Torrie Mayorga, RN     03/09/2022 0954 tiotropium (SPIRIVA) capsule for inhaler 18 mcg 18 mcg Inhalation Given Alesha Benjamin, RN     03/10/2022 3829 docusate sodium (COLACE) capsule 100 mg 100 mg Oral Not Given Torrie Mayorga RN     03/09/2022 1722 docusate sodium (COLACE) capsule 100 mg 100 mg Oral Given Dieudonne Mcnair RN     03/09/2022 5581 docusate sodium (COLACE) capsule 100 mg 100 mg Oral Not Given Soraya Aguilar Jeimy East, KERA     03/10/2022 0737 insulin lispro (HumaLOG) 100 units/mL subcutaneous injection 1-6 Units 1 Units Subcutaneous Not Given Chelle Solano, KERA     03/09/2022 1722 insulin lispro (HumaLOG) 100 units/mL subcutaneous injection 1-6 Units 2 Units Subcutaneous Given Fam Chan RN     03/09/2022 1052 insulin lispro (HumaLOG) 100 units/mL subcutaneous injection 1-6 Units 1 Units Subcutaneous Not Given Jeanne Sharp, RN     03/09/2022 2149 insulin lispro (HumaLOG) 100 units/mL subcutaneous injection 1-6 Units 6 Units Subcutaneous Given Johnna Gordon, RN     03/09/2022 2152 melatonin tablet 3 mg 3 mg Oral Given Johnna Gordon RN     03/10/2022 2726 metoprolol succinate (TOPROL-XL) 24 hr tablet 50 mg 50 mg Oral Given Booker Pascual, KERA     03/09/2022 6971 metoprolol succinate (TOPROL-XL) 24 hr tablet 50 mg 50 mg Oral Given Jeanne Sharp RN     03/10/2022 0832 albuterol (PROVENTIL HFA,VENTOLIN HFA) inhaler 2 puff 2 puff Inhalation Given Booker Pascual RN     03/09/2022 2149 albuterol (PROVENTIL HFA,VENTOLIN HFA) inhaler 2 puff 2 puff Inhalation Given Johnna Gordon RN     03/09/2022 1722 albuterol (PROVENTIL HFA,VENTOLIN HFA) inhaler 2 puff 2 puff Inhalation Given Fam Chan RN     03/09/2022 1157 albuterol (PROVENTIL HFA,VENTOLIN HFA) inhaler 2 puff 2 puff Inhalation Given Jeanne Sharp, KERA     03/09/2022 0954 albuterol (PROVENTIL HFA,VENTOLIN HFA) inhaler 2 puff 2 puff Inhalation Given Jeanne Sharp RN     03/09/2022 2149 insulin glargine (LANTUS) subcutaneous injection 14 Units 0 14 mL 14 Units Subcutaneous Given Johnna Gordon RN     03/10/2022 0830 insulin lispro (HumaLOG) 100 units/mL subcutaneous injection 3 Units 3 Units Subcutaneous Not Given Booker Pascual RN     03/09/2022 1722 insulin lispro (HumaLOG) 100 units/mL subcutaneous injection 3 Units 3 Units Subcutaneous Given Fam Chan RN     03/09/2022 1156 insulin lispro (HumaLOG) 100 units/mL subcutaneous injection 3 Units 3 Units Subcutaneous Given Prakash Devi RN     03/10/2022 4226 predniSONE tablet 20 mg 20 mg Oral Given Tiny Austin RN     03/09/2022 7348 predniSONE tablet 20 mg 20 mg Oral Given Prakash Devi RN     03/09/2022 0946 bumetanide (BUMEX) tablet 1 mg 1 mg Oral Not Given Prakash Devi RN     03/10/2022 3354 apixaban (ELIQUIS) tablet 5 mg 5 mg Oral Given Tiny Austin RN     03/09/2022 1722 apixaban (ELIQUIS) tablet 5 mg 5 mg Oral Given Marjan Crespo RN     03/10/2022 2928 bumetanide (BUMEX) tablet 1 mg 1 mg Oral Given Tiny Austin RN          PLEASE NOTE:  This encounter was completed utilizing the [employer] Direct Speech Voice Recognition Software  Grammatical errors, random word insertions, pronoun errors and incomplete sentences are occasional consequences of the system due to software limitations, ambient noise and hardware issues  These may be missed by proof reading prior to affixing electronic signature  Any questions or concerns about the content, text or information contained within the body of this dictation should be directly addressed to the physician for clarification  Please do not hesitate to call me directly if you have any any questions or concerns

## 2022-03-10 NOTE — PROGRESS NOTES
Pastoral Care Progress Note    3/10/2022  Patient: Fernando Way : 1965  Admission Date & Time: 3/1/2022 9547  MRN: [id number] CSN: [id number]       notices Keon fully dressed and sitting at edge of bed/preparing for discharge  Provided brief  visit to offer support for Keon as he discharges  Keon in good spirits as he and his wife look forward to his return home today                     03/10/22 1300   Clinical Encounter Type   Visited With Patient and family together

## 2022-03-10 NOTE — DISCHARGE INSTRUCTIONS
Stop taking torsemide  Start taking Bumex 1 mg twice daily  Start taking metoprolol 50 mg daily  Finish prednisone course of asthma exacerbation  Follow up with cardiology/heart failure on discharge  Follow up with your primary care provider within one week of discharge

## 2022-03-11 ENCOUNTER — TRANSITIONAL CARE MANAGEMENT (OUTPATIENT)
Dept: FAMILY MEDICINE CLINIC | Facility: CLINIC | Age: 57
End: 2022-03-11

## 2022-03-11 ENCOUNTER — PATIENT OUTREACH (OUTPATIENT)
Dept: FAMILY MEDICINE CLINIC | Facility: CLINIC | Age: 57
End: 2022-03-11

## 2022-03-11 DIAGNOSIS — Z71.89 COMPLEX CARE COORDINATION: Primary | ICD-10-CM

## 2022-03-11 NOTE — TELEPHONE ENCOUNTER
Spoke with pt and he was very upset  He was unable to get his medication  Called [facility] they have them on hold  Asked them to fill and wife will be there to   Advised pt [facility] is open until 7pm today

## 2022-03-14 ENCOUNTER — OFFICE VISIT (OUTPATIENT)
Dept: CARDIOLOGY CLINIC | Facility: CLINIC | Age: 57
End: 2022-03-14
Payer: COMMERCIAL

## 2022-03-14 VITALS
DIASTOLIC BLOOD PRESSURE: 64 MMHG | WEIGHT: 315 LBS | HEART RATE: 72 BPM | BODY MASS INDEX: 42.66 KG/M2 | HEIGHT: 72 IN | SYSTOLIC BLOOD PRESSURE: 118 MMHG | OXYGEN SATURATION: 98 %

## 2022-03-14 DIAGNOSIS — I50.32 CHRONIC HEART FAILURE WITH PRESERVED EJECTION FRACTION (HCC): Primary | ICD-10-CM

## 2022-03-14 DIAGNOSIS — Z95.818 PRESENCE OF CARDIOMEMS HF SYSTEM: ICD-10-CM

## 2022-03-14 DIAGNOSIS — Z09 HOSPITAL DISCHARGE FOLLOW-UP: ICD-10-CM

## 2022-03-14 DIAGNOSIS — G47.33 OBSTRUCTIVE SLEEP APNEA: ICD-10-CM

## 2022-03-14 DIAGNOSIS — E78.5 HYPERLIPIDEMIA, UNSPECIFIED HYPERLIPIDEMIA TYPE: ICD-10-CM

## 2022-03-14 DIAGNOSIS — I50.33 ACUTE ON CHRONIC DIASTOLIC HEART FAILURE (HCC): ICD-10-CM

## 2022-03-14 DIAGNOSIS — E11.9 TYPE 2 DIABETES MELLITUS WITHOUT COMPLICATION, WITHOUT LONG-TERM CURRENT USE OF INSULIN (HCC): ICD-10-CM

## 2022-03-14 DIAGNOSIS — I10 HYPERTENSION, UNSPECIFIED TYPE: ICD-10-CM

## 2022-03-14 DIAGNOSIS — I48.91 ATRIAL FIBRILLATION, UNSPECIFIED TYPE (HCC): ICD-10-CM

## 2022-03-14 PROCEDURE — 99214 OFFICE O/P EST MOD 30 MIN: CPT | Performed by: INTERNAL MEDICINE

## 2022-03-14 PROCEDURE — 1111F DSCHRG MED/CURRENT MED MERGE: CPT | Performed by: INTERNAL MEDICINE

## 2022-03-14 RX ORDER — METOPROLOL SUCCINATE 50 MG/1
50 TABLET, EXTENDED RELEASE ORAL DAILY
Qty: 90 TABLET | Refills: 3 | Status: SHIPPED | OUTPATIENT
Start: 2022-03-14 | End: 2022-03-14

## 2022-03-14 RX ORDER — METOPROLOL SUCCINATE 50 MG/1
50 TABLET, EXTENDED RELEASE ORAL DAILY
Qty: 90 TABLET | Refills: 1 | Status: SHIPPED | OUTPATIENT
Start: 2022-03-14

## 2022-03-14 RX ORDER — BUMETANIDE 1 MG/1
1 TABLET ORAL 2 TIMES DAILY
Qty: 180 TABLET | Refills: 3 | Status: SHIPPED | OUTPATIENT
Start: 2022-03-14 | End: 2022-03-14

## 2022-03-14 RX ORDER — BUMETANIDE 1 MG/1
1 TABLET ORAL 2 TIMES DAILY
Qty: 180 TABLET | Refills: 1 | Status: SHIPPED | OUTPATIENT
Start: 2022-03-14 | End: 2022-03-22 | Stop reason: SDUPTHER

## 2022-03-14 NOTE — PATIENT INSTRUCTIONS
Complete blood work in next 7-10 days  Refills sent to pharmacy  Will check on timing of stress test in relation to new CardioMEMS  Please weigh yourself every day and keep a detailed log of weights  Contact the Heart Failure program at [phone] if you gain 3 lbs overnight or 5 lbs in 5-7 days  Limit daily sodium/salt intake to 9032-9703 mg daily to prevent fluid retention  Avoid canned foods, [place] food, and processed meat (hot dogs, lunch meat, and sausage etc )  Limit fluid intake to 2000 mL or 2L (about 60-65 ounces) daily  Bring complete list of medications and log of daily weights to your follow-up appointment

## 2022-03-16 ENCOUNTER — PATIENT OUTREACH (OUTPATIENT)
Dept: FAMILY MEDICINE CLINIC | Facility: CLINIC | Age: 57
End: 2022-03-16

## 2022-03-16 ENCOUNTER — OFFICE VISIT (OUTPATIENT)
Dept: FAMILY MEDICINE CLINIC | Facility: CLINIC | Age: 57
End: 2022-03-16
Payer: COMMERCIAL

## 2022-03-16 VITALS
RESPIRATION RATE: 20 BRPM | HEIGHT: 72 IN | DIASTOLIC BLOOD PRESSURE: 62 MMHG | BODY MASS INDEX: 42.66 KG/M2 | SYSTOLIC BLOOD PRESSURE: 116 MMHG | HEART RATE: 92 BPM | OXYGEN SATURATION: 98 % | WEIGHT: 315 LBS | TEMPERATURE: 97.4 F

## 2022-03-16 DIAGNOSIS — E78.2 MIXED HYPERLIPIDEMIA: ICD-10-CM

## 2022-03-16 DIAGNOSIS — E11.65 TYPE 2 DIABETES MELLITUS WITH HYPERGLYCEMIA, WITHOUT LONG-TERM CURRENT USE OF INSULIN (HCC): ICD-10-CM

## 2022-03-16 DIAGNOSIS — Z12.11 SCREENING FOR COLON CANCER: ICD-10-CM

## 2022-03-16 DIAGNOSIS — E66.01 MORBID OBESITY (HCC): ICD-10-CM

## 2022-03-16 DIAGNOSIS — G47.33 OSA (OBSTRUCTIVE SLEEP APNEA): ICD-10-CM

## 2022-03-16 DIAGNOSIS — I50.33 ACUTE ON CHRONIC DIASTOLIC HEART FAILURE (HCC): Primary | ICD-10-CM

## 2022-03-16 DIAGNOSIS — I48.0 PAROXYSMAL ATRIAL FIBRILLATION (HCC): ICD-10-CM

## 2022-03-16 DIAGNOSIS — J45.901 MODERATE ASTHMA WITH ACUTE EXACERBATION, UNSPECIFIED WHETHER PERSISTENT: ICD-10-CM

## 2022-03-16 DIAGNOSIS — N18.31 STAGE 3A CHRONIC KIDNEY DISEASE (HCC): ICD-10-CM

## 2022-03-16 DIAGNOSIS — I10 ESSENTIAL HYPERTENSION: ICD-10-CM

## 2022-03-16 PROCEDURE — 99496 TRANSJ CARE MGMT HIGH F2F 7D: CPT | Performed by: FAMILY MEDICINE

## 2022-03-16 PROCEDURE — 1111F DSCHRG MED/CURRENT MED MERGE: CPT | Performed by: FAMILY MEDICINE

## 2022-03-16 PROCEDURE — 3008F BODY MASS INDEX DOCD: CPT | Performed by: INTERNAL MEDICINE

## 2022-03-16 NOTE — PROGRESS NOTES
Chief Complaint   Patient presents with    Transition of Care Management     Discharged 03/10/22  Health Maintenance   Topic Date Due    Pneumococcal Vaccine: Pediatrics (0 to 5 Years) and At-Risk Patients (6 to 59 Years) (1 of 2 - PPSV23) Never done    HIV Screening  Never done    Colorectal Cancer Screening  Never done    URINE MICROALBUMIN  04/23/2020    Influenza Vaccine (1) Never done    COVID-19 Vaccine (3 - Booster for Delvalle Peter series) 11/05/2021    Annual Physical  05/14/2022    HEMOGLOBIN A1C  09/07/2022    Diabetic Foot Exam  10/13/2022    DM Eye Exam  11/04/2022    Depression Screening  11/16/2022    BMI: Followup Plan  01/11/2023    BMI: Adult  03/16/2023    DTaP,Tdap,and Td Vaccines (2 - Td or Tdap) 06/09/2028    Hepatitis C Screening  Completed    HIB Vaccine  Aged Out    Hepatitis B Vaccine  Aged Out    IPV Vaccine  Aged Out    Hepatitis A Vaccine  Aged Out    Meningococcal ACWY Vaccine  Aged Out    HPV Vaccine  Aged Out           Assessment/Plan:     Patient presents for TCM visit  He was admitted to [address] [place] [address] 3/1/22 - 3/10/22 for acute on chronic CHF, acute asthma exacerbation  Acute on chronic diastolic heart failure (HCC)  Wt Readings from Last 3 Encounters:   03/16/22 (!) 146 kg (322 lb 3 2 oz)   03/14/22 (!) 144 kg (317 lb 8 oz)   03/10/22 (!) 140 kg (309 lb 4 9 oz)     Patient reports significant improvement in shortness breath, leg edema  Continue Bumex 1 mg twice daily  Follow a low-sodium diet  Check daily weights  Follow up with cardiology  Paroxysmal atrial fibrillation [facility])  Patient denies heart palpitations  Continue Metoprolol, anticoagulation with Eliquis 5 mg twice daily  Essential hypertension  Blood pressure is well controlled  Continue Metoprolol ER 50 mg daily      Stage 3a chronic kidney disease [facility])  Lab Results   Component Value Date    EGFR 66 03/10/2022    EGFR 58 03/08/2022    EGFR 58 03/07/2022    CREATININE 1 23 03/10/2022    CREATININE 1 36 (H) 03/08/2022    CREATININE 1 37 (H) 03/07/2022     Creatinine 1 23, GFR 66  Recommended to avoid NSAID's, nephrotoxins  Will continue to monitor labs  Type 2 diabetes mellitus with hyperglycemia (HCC)    Lab Results   Component Value Date    HGBA1C 6 3 (H) 03/07/2022     Blood sugar control improved  Continue Metformin, Januvia  Monitor blood sugar at home  Check eye exam by ophthalmology annually  Moderate asthma with acute exacerbation  Patient completed Prednisone taper yesterday  Reports no cough, no chest tightness or wheezing  Continue to use Symbicort, Spiriva  Continue Singulair 10 mg at bedtime  Use Ventolin HFA inhaler PRN  Follow up with pulmonology  VICKY (obstructive sleep apnea)  Patient is awaiting on new CPAP machine  Morbid obesity ([facility] [address] )  Discussed dietary and lifestyle modifications  Encouraged to continue working on weight reduction  Mixed hyperlipidemia  Continue Atorvastatin 10 mg daily  HM: recommended to schedule screening colonoscopy  Schedule follow-up visit in 3 months  Diagnoses and all orders for this visit:    Acute on chronic diastolic heart failure (HCC)    Paroxysmal atrial fibrillation (HCC)    Essential hypertension    Stage 3a chronic kidney disease (HCC)    Type 2 diabetes mellitus with hyperglycemia, without long-term current use of insulin (HCC)    Moderate asthma with acute exacerbation, unspecified whether persistent    VICKY (obstructive sleep apnea)    Mixed hyperlipidemia    Morbid obesity ([facility] [address] )    Screening for colon cancer  -     Ambulatory Referral to Colorectal Surgery; Future         Subjective:     Patient ID: Rhona Gowers is a 49 y o  male  HPI     Patient presents for TCM visit  He was admitted to [address] [place] [address] 3/1/22 - 3/10/22 for acute on chronic CHF, acute asthma exacerbation      Reviewed hospital records, test results, discharge medications with patient  Patient was discharged home on Bumex 1 mg twice daily  He had cardio MEMs implanted on 3/8/22  Reports significant improvement in shortness of breath  Denies chest pain  No cough  Completed Prednisone taper yesterday for asthma exacerbation  Patient is awaiting for replacement on CPAP machine  Blood work done on March 10, 2022  Glucose 109, potassium 3 8, creatinine 1 23, GFR 66  Patient seen by cardiology KARINE on March 14, 2022  Type 2 DM - patient takes Januvia 100 mg daily, Metformin 500 mg 1 tablet twice daily  Reports no hypoglycemic episodes  PAF - denies heart palpitations  No chest pain  Currently on anticoagulation with Eliquis 5 mg twice daily  Reports easy bruising  Hyperlipidemia- on Atorvastatin 10 mg daily  Review of Systems   Constitutional: Negative for activity change, appetite change, chills, fatigue and fever  HENT: Negative for congestion, ear pain, sore throat and trouble swallowing  Ear discharge:       Eyes: Negative  Respiratory: Positive for shortness of breath (improved )  Negative for cough, chest tightness and wheezing  Cardiovascular: Positive for leg swelling (improved)  Negative for chest pain and palpitations  Gastrointestinal: Negative for abdominal pain, constipation, diarrhea, nausea and vomiting  Genitourinary: Positive for frequency  Negative for difficulty urinating, dysuria, flank pain and hematuria  Musculoskeletal: Negative for arthralgias, back pain, gait problem, joint swelling and myalgias  Skin: Negative for rash  Neurological: Negative for dizziness, syncope and headaches  Hematological: Negative for adenopathy  Bruises/bleeds easily  Psychiatric/Behavioral: Negative for dysphoric mood and sleep disturbance  The patient is not nervous/anxious  Objective:     Physical Exam  Vitals reviewed  Constitutional:       Appearance: Normal appearance  He is obese  HENT:      Head: Normocephalic and atraumatic  Eyes:      Conjunctiva/sclera: Conjunctivae normal       Pupils: Pupils are equal, round, and reactive to light  Neck:      Vascular: No carotid bruit  Cardiovascular:      Rate and Rhythm: Regular rhythm  Heart sounds: No murmur heard  Pulmonary:      Effort: Pulmonary effort is normal       Breath sounds: Normal breath sounds  No wheezing  Abdominal:      General: There is no distension  Palpations: Abdomen is soft  Tenderness: There is no abdominal tenderness  Musculoskeletal:         General: No swelling, tenderness, deformity or signs of injury  Normal range of motion  Cervical back: Normal range of motion and neck supple  Right lower leg: No edema  Left lower leg: No edema  Skin:     General: Skin is warm and dry  Findings: No rash  Neurological:      Mental Status: He is alert  Vitals:    03/16/22 0945   BP: 116/62   BP Location: Left arm   Patient Position: Sitting   Cuff Size: Large   Pulse: 92   Resp: 20   Temp: (!) 97 4 °F (36 3 °C)   TempSrc: Tympanic   SpO2: 98%   Weight: (!) 146 kg (322 lb 3 2 oz)   Height: 6' (1 829 m)       Transitional Care Management Review:  Amanuel Pandya is a 49 y o  male here for TCM follow up  During the TCM phone call patient stated:    TCM Call (since 2/13/2022)     Date and time call was made  3/15/2022  9:33 AM    Patient was hospitialized at  [facility]        Date of Admission  03/01/22    Date of discharge  03/10/22    Diagnosis  Acute on chronic diastolic heart failure ([address] )    Disposition  Home    Current Symptoms  None      TCM Call (since 2/13/2022)     Post hospital issues  None    Should patient be enrolled in anticoag monitoring? No    Scheduled for follow up?   Yes    Patients specialists  Cardiologist    Cardiologist name  Rossy Delgado MD    Cardiologist contact #  [phone]    Did you obtain your prescribed medications  Yes    Do you need help managing your prescriptions or medications  No    Is transportation to your appointment needed  No    I have advised the patient to call PCP with any new or worsening symptoms  [address]  None    Are you recieving any outpatient services  No    Are you recieving home care services  No    Are you using any community resources  No    Current waiver services  No    Have you fallen in the last 12 months  No    Interperter language line needed  No    Counseling  Patient          Elvin Quiros MD

## 2022-03-16 NOTE — PROGRESS NOTES
Met patient has his hospital follow up appointment  He is doing well  Saw cardiology yesterday  No questions or concerns   No need for me to follow up

## 2022-03-17 NOTE — ASSESSMENT & PLAN NOTE
Patient denies heart palpitations  Continue Metoprolol, anticoagulation with Eliquis 5 mg twice daily

## 2022-03-17 NOTE — ASSESSMENT & PLAN NOTE
Lab Results   Component Value Date    HGBA1C 6 3 (H) 03/07/2022     Blood sugar control improved  Continue Metformin, Januvia  Monitor blood sugar at home  Check eye exam by ophthalmology annually

## 2022-03-17 NOTE — ASSESSMENT & PLAN NOTE
Patient completed Prednisone taper yesterday  Reports no cough, no chest tightness or wheezing  Continue to use Symbicort, Spiriva  Continue Singulair 10 mg at bedtime  Use Ventolin HFA inhaler PRN  Follow up with pulmonology

## 2022-03-17 NOTE — ASSESSMENT & PLAN NOTE
Lab Results   Component Value Date    EGFR 66 03/10/2022    EGFR 58 03/08/2022    EGFR 58 03/07/2022    CREATININE 1 23 03/10/2022    CREATININE 1 36 (H) 03/08/2022    CREATININE 1 37 (H) 03/07/2022     Creatinine 1 23, GFR 66  Recommended to avoid NSAID's, nephrotoxins  Will continue to monitor labs

## 2022-03-17 NOTE — ASSESSMENT & PLAN NOTE
Wt Readings from Last 3 Encounters:   03/16/22 (!) 146 kg (322 lb 3 2 oz)   03/14/22 (!) 144 kg (317 lb 8 oz)   03/10/22 (!) 140 kg (309 lb 4 9 oz)     Patient reports significant improvement in shortness breath, leg edema  Continue Bumex 1 mg twice daily  Follow a low-sodium diet  Check daily weights  Follow up with cardiology

## 2022-03-21 ENCOUNTER — TELEPHONE (OUTPATIENT)
Dept: CARDIOLOGY CLINIC | Facility: CLINIC | Age: 57
End: 2022-03-21

## 2022-03-21 NOTE — TELEPHONE ENCOUNTER
CardioMem below  PA pressure elevated >3 x3 days  S/w Keon, denies any complaint  Weight @ home stable 311-312 lbs  No dietary indiscretion  Currently taking Bumex 1 mg BID w/ Potassium 20 meq BID   O/v w/ Dr Yue Payton 4/19

## 2022-03-21 NOTE — TELEPHONE ENCOUNTER
Per Dr Francisco Mcnulty, increase Bumex 2mg BID w/ 40 meq Potassium BID 1-2 days   S/w Keon advised-- verbally understood   Agreeable w/ plan

## 2022-03-22 ENCOUNTER — TELEPHONE (OUTPATIENT)
Dept: CARDIOLOGY CLINIC | Facility: CLINIC | Age: 57
End: 2022-03-22

## 2022-03-22 DIAGNOSIS — I50.32 CHRONIC HEART FAILURE WITH PRESERVED EJECTION FRACTION (HCC): ICD-10-CM

## 2022-03-22 DIAGNOSIS — E87.6 HYPOKALEMIA: ICD-10-CM

## 2022-03-22 RX ORDER — POTASSIUM CHLORIDE 20 MEQ/1
TABLET, EXTENDED RELEASE ORAL
Qty: 270 TABLET | Refills: 3 | Status: SHIPPED | OUTPATIENT
Start: 2022-03-22 | End: 2022-04-07 | Stop reason: SDUPTHER

## 2022-03-22 RX ORDER — BUMETANIDE 1 MG/1
TABLET ORAL
Qty: 270 TABLET | Refills: 3 | Status: SHIPPED | OUTPATIENT
Start: 2022-03-22 | End: 2022-04-07 | Stop reason: SDUPTHER

## 2022-03-22 NOTE — TELEPHONE ENCOUNTER
PA pressure improved today  Per Dr Susan Cota take Bumex 2 mg in am and 1 mg in pm indefinitely  Take Potassium 40 meq am and 20 meq pm  S/w Keon advised-- verbally understood  Needs refill-- will send in separate encounter

## 2022-03-23 DIAGNOSIS — I48.91 A-FIB (HCC): ICD-10-CM

## 2022-03-23 DIAGNOSIS — I48.91 ATRIAL FIBRILLATION, UNSPECIFIED TYPE (HCC): Primary | ICD-10-CM

## 2022-04-04 ENCOUNTER — TELEPHONE (OUTPATIENT)
Dept: CARDIOLOGY CLINIC | Facility: CLINIC | Age: 57
End: 2022-04-04

## 2022-04-04 NOTE — TELEPHONE ENCOUNTER
Keon called, he wanted to make me aware that he wasn't able to get cardioMEMS reading on Saturday due to loss of power-- I made note of this in [place]  Patient c/o SOB w/ exertion going upstairs  Needs to rest when he gets to top of steps  Denies any LE edema  Asthma at baseline-- using Albuterol inhaler  CardioMEMS- PA pressure 18 today-- goal 15  HR read 91  C/o having a cold  Advised to continue to monitor and c/b if symptoms worsen-- I advised him I will monitor CardioMEMS closely  Perry Krishna is asking to switch care to you instead of Dr Charles Delgado?-- feels as though his o/v's keep getting moved  Has only met him once outpatient

## 2022-04-05 NOTE — TELEPHONE ENCOUNTER
Do you want 20 or 40 mins w/ Keon? Your next opening is May 9th for 20 mins   Let me know where you want me to put him

## 2022-04-05 NOTE — TELEPHONE ENCOUNTER
Please call dawood Herbert appt w/ Dr Ye Smoke and schedule w/ Dr Felix [place] for 5/9 @ 440pm  Thanks Renée Roblero

## 2022-04-06 ENCOUNTER — TELEPHONE (OUTPATIENT)
Dept: CARDIOLOGY CLINIC | Facility: CLINIC | Age: 57
End: 2022-04-06

## 2022-04-06 DIAGNOSIS — I50.32 CHRONIC HEART FAILURE WITH PRESERVED EJECTION FRACTION (HCC): Primary | ICD-10-CM

## 2022-04-06 NOTE — TELEPHONE ENCOUNTER
Per Dr Cristina Azevedo, check BMP  If creat stable add additional Bumex 1 mg in afternoon and Potassium to 40 meq BID  Chancy Langdon called me for update and I advised him of recommendations-- he will go for [facility] tomorrow  I will call him tomorrow w/ results

## 2022-04-06 NOTE — TELEPHONE ENCOUNTER
Keon called back today  C/o b/l ankle and pedal edema starting last night  SOB w/ exertion which he called about on Monday which I still present  Now having wheezing when he lays down-- denies orthopnea  Still c/o cold like symptoms-- cough w/ exertion  Not sneezing as much  Denies congestion, runny nose, fever  Continues to use Albuterol inhaler Q4  Has not needed to use nebs  Weights stable at home, 316 lbs  Currently taking Bumex 2 mg am, 1 pm afternoon and Potassium 40 meq am and 20 meq afternoon  PA pressures are at baseline today  4/5-4/4 elevated 18-19 mmhg   Goal 15     O/v w/ you 5/9

## 2022-04-07 ENCOUNTER — APPOINTMENT (OUTPATIENT)
Dept: LAB | Facility: HOSPITAL | Age: 57
End: 2022-04-07
Attending: INTERNAL MEDICINE
Payer: COMMERCIAL

## 2022-04-07 ENCOUNTER — TELEPHONE (OUTPATIENT)
Dept: PULMONOLOGY | Facility: CLINIC | Age: 57
End: 2022-04-07

## 2022-04-07 ENCOUNTER — TELEPHONE (OUTPATIENT)
Dept: CARDIOLOGY CLINIC | Facility: CLINIC | Age: 57
End: 2022-04-07

## 2022-04-07 DIAGNOSIS — Z09 HOSPITAL DISCHARGE FOLLOW-UP: ICD-10-CM

## 2022-04-07 DIAGNOSIS — T78.40XD ALLERGY, SUBSEQUENT ENCOUNTER: ICD-10-CM

## 2022-04-07 DIAGNOSIS — J45.41 MODERATE PERSISTENT ASTHMA WITH ACUTE EXACERBATION: ICD-10-CM

## 2022-04-07 DIAGNOSIS — I50.32 CHRONIC HEART FAILURE WITH PRESERVED EJECTION FRACTION (HCC): ICD-10-CM

## 2022-04-07 DIAGNOSIS — E87.6 HYPOKALEMIA: ICD-10-CM

## 2022-04-07 DIAGNOSIS — R06.00 DYSPNEA ON EXERTION: ICD-10-CM

## 2022-04-07 LAB
A ALTERNATA IGE QN: <0.1 KUA/I
A FUMIGATUS IGE QN: <0.1 KUA/I
ANION GAP SERPL CALCULATED.3IONS-SCNC: 6 MMOL/L (ref 4–13)
BERMUDA GRASS IGE QN: <0.1 KUA/I
BOXELDER IGE QN: <0.1 KUA/I
BUN SERPL-MCNC: 17 MG/DL (ref 5–25)
C HERBARUM IGE QN: <0.1 KUA/I
CALCIUM SERPL-MCNC: 9.7 MG/DL (ref 8.3–10.1)
CAT DANDER IGE QN: 0.97 KUA/I
CHLORIDE SERPL-SCNC: 102 MMOL/L (ref 100–108)
CMN PIGWEED IGE QN: <0.1 KUA/I
CO2 SERPL-SCNC: 31 MMOL/L (ref 21–32)
COMMON RAGWEED IGE QN: <0.1 KUA/I
COTTONWOOD IGE QN: <0.1 KUA/I
CREAT SERPL-MCNC: 1.7 MG/DL (ref 0.6–1.3)
D FARINAE IGE QN: 14.3 KUA/I
D PTERONYSS IGE QN: 9.45 KUA/I
DOG DANDER IGE QN: 9.28 KUA/I
GFR SERPL CREATININE-BSD FRML MDRD: 44 ML/MIN/1.73SQ M
GLUCOSE P FAST SERPL-MCNC: 139 MG/DL (ref 65–99)
LONDON PLANE IGE QN: <0.1 KUA/I
MOUSE URINE PROT IGE QN: <0.1 KUA/I
MT JUNIPER IGE QN: <0.1 KUA/I
MUGWORT IGE QN: <0.1 KUA/I
P NOTATUM IGE QN: <0.1 KUA/I
POTASSIUM SERPL-SCNC: 4 MMOL/L (ref 3.5–5.3)
ROACH IGE QN: <0.1 KUA/I
SHEEP SORREL IGE QN: <0.1 KUA/I
SILVER BIRCH IGE QN: <0.1 KUA/I
SODIUM SERPL-SCNC: 139 MMOL/L (ref 136–145)
TIMOTHY IGE QN: <0.1 KUA/I
TOTAL IGE SMQN RAST: 204 KU/L (ref 0–113)
WALNUT IGE QN: <0.1 KUA/I
WHITE ASH IGE QN: <0.1 KUA/I
WHITE ELM IGE QN: <0.1 KUA/I
WHITE MULBERRY IGE QN: <0.1 KUA/I
WHITE OAK IGE QN: <0.1 KUA/I

## 2022-04-07 PROCEDURE — 86003 ALLG SPEC IGE CRUDE XTRC EA: CPT

## 2022-04-07 PROCEDURE — 83880 ASSAY OF NATRIURETIC PEPTIDE: CPT

## 2022-04-07 PROCEDURE — 82785 ASSAY OF IGE: CPT

## 2022-04-07 PROCEDURE — 36415 COLL VENOUS BLD VENIPUNCTURE: CPT

## 2022-04-07 PROCEDURE — 80048 BASIC METABOLIC PNL TOTAL CA: CPT

## 2022-04-07 RX ORDER — BUMETANIDE 1 MG/1
1 TABLET ORAL 2 TIMES DAILY
Qty: 180 TABLET | Refills: 3
Start: 2022-04-07 | End: 2022-06-24

## 2022-04-07 RX ORDER — POTASSIUM CHLORIDE 20 MEQ/1
20 TABLET, EXTENDED RELEASE ORAL 2 TIMES DAILY
Qty: 180 TABLET | Refills: 3
Start: 2022-04-07 | End: 2022-07-28

## 2022-04-07 NOTE — TELEPHONE ENCOUNTER
4/7 Creat 1 7, K 4  PA pressure today 16    S/w Dr Lawrence Speaker, advised to decrease Bumex to 1 mg BID  Called and s/w Keon  Symptoms are unchanged  Advised PA pressure today is 16  Goal 15  Symptoms unrelated to HF  This may be r/t asthma or URI  Advised to decrease Bumex to 1 mg BID due to creat of 1 7 and decrease Potassium to 20 meq BID as well-- verbally understood  He will reach out to PCP/Pulmonary for further recommendations  Will continue to monitor CardioMEMS  Patient will call w/ worsening HF symptoms  Med list will be adjusted       O/v w/ Dr Lawrence Speaker 5/9

## 2022-04-07 NOTE — TELEPHONE ENCOUNTER
Patient is calling, he went to see his Cardiologist Doctor and was having some issues of his shortness of breath that he thought might have been from his new heart valve placement he got  The cardiologist said it wouldn't be from that and he should contact our office  He has become short of breath on exertion and coughing, he is bringing up clear phlegm  He isn't sure if he needs an antibiotic   Please advise

## 2022-04-08 ENCOUNTER — HOSPITAL ENCOUNTER (EMERGENCY)
Facility: HOSPITAL | Age: 57
Discharge: HOME | End: 2022-04-08
Attending: EMERGENCY MEDICINE
Payer: COMMERCIAL

## 2022-04-08 ENCOUNTER — TELEPHONE (OUTPATIENT)
Dept: CARDIOLOGY CLINIC | Facility: CLINIC | Age: 57
End: 2022-04-08

## 2022-04-08 ENCOUNTER — APPOINTMENT (EMERGENCY)
Dept: RADIOLOGY | Facility: HOSPITAL | Age: 57
End: 2022-04-08
Payer: COMMERCIAL

## 2022-04-08 ENCOUNTER — OFFICE VISIT (OUTPATIENT)
Dept: PULMONOLOGY | Facility: CLINIC | Age: 57
End: 2022-04-08
Payer: COMMERCIAL

## 2022-04-08 VITALS
DIASTOLIC BLOOD PRESSURE: 74 MMHG | RESPIRATION RATE: 20 BRPM | TEMPERATURE: 98.2 F | HEART RATE: 88 BPM | SYSTOLIC BLOOD PRESSURE: 120 MMHG | WEIGHT: 306 LBS | OXYGEN SATURATION: 92 % | HEIGHT: 72 IN | BODY MASS INDEX: 41.45 KG/M2

## 2022-04-08 VITALS
SYSTOLIC BLOOD PRESSURE: 130 MMHG | RESPIRATION RATE: 20 BRPM | TEMPERATURE: 98.4 F | HEART RATE: 78 BPM | OXYGEN SATURATION: 99 % | DIASTOLIC BLOOD PRESSURE: 92 MMHG

## 2022-04-08 DIAGNOSIS — N17.9 AKI (ACUTE KIDNEY INJURY) (HCC): ICD-10-CM

## 2022-04-08 DIAGNOSIS — R06.00 DYSPNEA ON EXERTION: Primary | ICD-10-CM

## 2022-04-08 DIAGNOSIS — I50.32 CHRONIC DIASTOLIC HEART FAILURE (HCC): ICD-10-CM

## 2022-04-08 LAB
ANION GAP SERPL CALCULATED.3IONS-SCNC: 7 MMOL/L (ref 4–13)
BUN SERPL-MCNC: 17 MG/DL (ref 5–25)
CALCIUM SERPL-MCNC: 9.1 MG/DL (ref 8.3–10.1)
CARDIAC TROPONIN I PNL SERPL HS: 11 NG/L
CHLORIDE SERPL-SCNC: 104 MMOL/L (ref 100–108)
CO2 SERPL-SCNC: 28 MMOL/L (ref 21–32)
CREAT SERPL-MCNC: 1.65 MG/DL (ref 0.6–1.3)
ERYTHROCYTE [DISTWIDTH] IN BLOOD BY AUTOMATED COUNT: 14.1 % (ref 11.6–15.1)
GFR SERPL CREATININE-BSD FRML MDRD: 46 ML/MIN/1.73SQ M
GLUCOSE SERPL-MCNC: 111 MG/DL (ref 65–140)
HCT VFR BLD AUTO: 37 % (ref 36.5–49.3)
HGB BLD-MCNC: 12.4 G/DL (ref 12–17)
MCH RBC QN AUTO: 30 PG (ref 26.8–34.3)
MCHC RBC AUTO-ENTMCNC: 33.5 G/DL (ref 31.4–37.4)
MCV RBC AUTO: 90 FL (ref 82–98)
NT-PROBNP SERPL-MCNC: 609 PG/ML
NT-PROBNP SERPL-MCNC: 824 PG/ML
PLATELET # BLD AUTO: 355 THOUSANDS/UL (ref 149–390)
PMV BLD AUTO: 10.9 FL (ref 8.9–12.7)
POTASSIUM SERPL-SCNC: 4.4 MMOL/L (ref 3.5–5.3)
RBC # BLD AUTO: 4.13 MILLION/UL (ref 3.88–5.62)
SODIUM SERPL-SCNC: 139 MMOL/L (ref 136–145)
WBC # BLD AUTO: 11.27 THOUSAND/UL (ref 4.31–10.16)

## 2022-04-08 PROCEDURE — 93005 ELECTROCARDIOGRAM TRACING: CPT

## 2022-04-08 PROCEDURE — 94618 PULMONARY STRESS TESTING: CPT | Performed by: INTERNAL MEDICINE

## 2022-04-08 PROCEDURE — 99285 EMERGENCY DEPT VISIT HI MDM: CPT | Performed by: EMERGENCY MEDICINE

## 2022-04-08 PROCEDURE — 94760 N-INVAS EAR/PLS OXIMETRY 1: CPT

## 2022-04-08 PROCEDURE — 84484 ASSAY OF TROPONIN QUANT: CPT | Performed by: EMERGENCY MEDICINE

## 2022-04-08 PROCEDURE — 3066F NEPHROPATHY DOC TX: CPT | Performed by: PHYSICIAN ASSISTANT

## 2022-04-08 PROCEDURE — 94664 DEMO&/EVAL PT USE INHALER: CPT

## 2022-04-08 PROCEDURE — 94644 CONT INHLJ TX 1ST HOUR: CPT

## 2022-04-08 PROCEDURE — 83880 ASSAY OF NATRIURETIC PEPTIDE: CPT | Performed by: EMERGENCY MEDICINE

## 2022-04-08 PROCEDURE — 36415 COLL VENOUS BLD VENIPUNCTURE: CPT | Performed by: EMERGENCY MEDICINE

## 2022-04-08 PROCEDURE — 1111F DSCHRG MED/CURRENT MED MERGE: CPT | Performed by: INTERNAL MEDICINE

## 2022-04-08 PROCEDURE — 85027 COMPLETE CBC AUTOMATED: CPT | Performed by: EMERGENCY MEDICINE

## 2022-04-08 PROCEDURE — 71046 X-RAY EXAM CHEST 2 VIEWS: CPT

## 2022-04-08 PROCEDURE — 99215 OFFICE O/P EST HI 40 MIN: CPT | Performed by: INTERNAL MEDICINE

## 2022-04-08 PROCEDURE — 80048 BASIC METABOLIC PNL TOTAL CA: CPT | Performed by: EMERGENCY MEDICINE

## 2022-04-08 PROCEDURE — 99285 EMERGENCY DEPT VISIT HI MDM: CPT

## 2022-04-08 RX ADMIN — ALBUTEROL SULFATE 10 MG: 2.5 SOLUTION RESPIRATORY (INHALATION) at 10:52

## 2022-04-08 NOTE — CASE MANAGEMENT
Case Management ED Progress Note    Patient name Arnav Carter  Location [facility] MRN [id number]  : 1965 Date 2022        OBJECTIVE:  Predictive Model Details         51% Factor Value    Risk of Hospital Admission or ED Visit Model Is in Relationship Yes     Number of ED Visits 3     Number of Hospitalizations 5+     Has Chronic Kidney Disease Yes     Has Atrial Fibrillation Yes     Has CVD Yes     Has Diabetes Yes     Has Asthma Yes     Has CHF Yes     Has PCP Yes       Chief Complaint: SOB (shortness of breath)   Patient Class: Emergency  Preferred Pharmacy:   [address], [address]  [address]   [address]  [address]  [address]  Phone: [phone] Fax: [phone]    Primary Care Provider: Ada Allison MD    [facility]   Secondary Insurance:     ED Progress Note:  CM responded to Code R alert  RECENT ADMISSION: 3/1/22-3/10/22  AGE:56  SEX:Male  DX: Acute on Chronic Diastolic Heart Failure   OUTCOME: admission then d/c to home  RESOURCES ON D/C (previous admission): follow up with OP cardiology   CURRENT F/U APPTS: Pulmonary: 22- sent pt to ED     Cariology: ,, 3/22, 3/21, 3/14     Family med: 3/16  *REASON FOR READMISSION: Sent to ED by Pulmonary due to SOB and leg swelling  *INTERVENTIONS: resources provided for community follow-up     At the request of Heart Failure and ED resident CM reached out to [facility] ([phone]) to request an appointment with Dr Twan Roy early next week for follow up  CM was informed that Dr Twan Roy is out of the office next week but her HF NP Sukumar Kaur first available appointments are 4/15  Pt as scheduled for an follow up appointment on 4/15 at 1300  Pt was provided with appointment information as well as the phone number to call to reschedule if he cannot make that time       At bedside pt was frustrated that he was being d/c stating that he feels this is HF again and he thinks he will be back in the ED in a few days when things get worse  CM relayed pt's frustration to ED resident         *Mandatory field

## 2022-04-08 NOTE — ED PROVIDER NOTES
History  Chief Complaint   Patient presents with    Shortness of Breath     pt c o increased sob and swollen lower extremity since Monday  Kristine Maciel is a 49y o  year old male with PMH of HFpEF, PAF on eliquis, T2DM, asthma presenting to the [facility] ED for shortness of breath  Patient reports one week of worsening dyspnea on exertion and conversation, orthopnea and lower extremity edema  The patient denies fevers, cough or chest pain  He has been compliant with eliquis and bumex BID  He states that he is making urine  He believes he is at his dry weight  Patient denies Hx of DVT/PE previously  Patient was seen by pulmonlogy earlier today and was referred to the ED for evaluation  Patient compliant with Bumex BID, currently taking 1mg in AM and 1mg in PM       History provided by:  Patient   used: No    Shortness of Breath  Associated symptoms: no abdominal pain, no chest pain, no cough, no fever, no headaches, no rash, no vomiting and no wheezing        Prior to Admission Medications   Prescriptions Last Dose Informant Patient Reported? Taking? Accu-Chek FastClix Lancets MISC  Self No No   Sig: Test blood sugar twice daily   Blood Glucose Monitoring Suppl (Accu-Chek Guide) w/Device KIT  Self No No   Sig: Use 2 (two) times a day Test blood sugar twice daily   MULTIPLE VITAMINS-CALCIUM PO  Self Yes No   Sig: Take 1 tablet by mouth daily   albuterol (PROVENTIL HFA,VENTOLIN HFA) 90 mcg/act inhaler  Self No No   Sig: Inhale 2 puffs every 4 (four) hours as needed for wheezing or shortness of breath   apixaban (Eliquis) 5 mg  Self No No   Sig: Take 1 tablet (5 mg total) by mouth 2 (two) times a day   atorvastatin (LIPITOR) 10 mg tablet  Self No No   Sig: TAKE 1 TABLET BY MOUTH EVERY DAY   budesonide-formoterol (Symbicort) 160-4 5 mcg/act inhaler  Self No No   Sig: Inhale 2 puffs 2 (two) times a day Rinse mouth after use     bumetanide (BUMEX) 1 mg tablet  Self No No   Sig: Take 1 tablet (1 mg total) by mouth 2 (two) times a day   glucose blood (Accu-Chek Guide) test strip  Self No No   Sig: Test blood sugar twice daily   guaiFENesin (MUCINEX) 600 mg 12 hr tablet  Self No No   Sig: Take 1 tablet (600 mg total) by mouth 2 (two) times a day   levalbuterol (Xopenex) 1 25 mg/3 mL nebulizer solution  Self No No   Sig: Take 3 mL (1 25 mg total) by nebulization 3 (three) times a day   Patient taking differently: Take 1 25 mg by nebulization as needed     metFORMIN (GLUCOPHAGE) 500 mg tablet  Self No No   Sig: Take 1 tablet (500 mg total) by mouth 2 (two) times a day After meals   metoprolol succinate (TOPROL-XL) 50 mg 24 hr tablet  Self No No   Sig: Take 1 tablet (50 mg total) by mouth daily   montelukast (SINGULAIR) 10 mg tablet  Self No No   Sig: Take 1 tablet (10 mg total) by mouth daily at bedtime   potassium chloride (K-DUR,KLOR-CON) 20 mEq tablet  Self No No   Sig: Take 1 tablet (20 mEq total) by mouth 2 (two) times a day   sitaGLIPtin (Januvia) 100 mg tablet  Self No No   Sig: Take 1 tablet (100 mg total) by mouth daily   sodium chloride (OCEAN) 0 65 % nasal spray  Self No No   Si spray into each nostril 3 (three) times a day   tiotropium (Spiriva Respimat) 1 25 MCG/ACT AERS inhaler  Self No No   Sig: Inhale 2 puffs daily      Facility-Administered Medications: None       Past Medical History:   Diagnosis Date    Acute gastritis without hemorrhage     last assessed 3/24/17    Asthma     Diabetes mellitus ([facility] )     Gastric bypass status for obesity     Hyperlipidemia     Hypertension     Impaired fasting glucose     last assessed 5/10/17    Obesity     Viral gastroenteritis     last assessed 16       Past Surgical History:   Procedure Laterality Date    CARDIAC CATHETERIZATION N/A 3/9/2022    Procedure: CARDIAC RHC W/ PRESSURE SENSOR;  Surgeon: Agata Bryan MD;  Location: [facility];   Service: Cardiology    CARPAL TUNNEL RELEASE      bilateral    GASTRIC BYPASS   with han en y   [address]      ventral inscisional    TONSILLECTOMY         Family History   Problem Relation Age of Onset    Stroke Mother     Hypertension Father      I have reviewed and agree with the history as documented  E-Cigarette/Vaping    E-Cigarette Use Never User      E-Cigarette/Vaping Substances    Nicotine No     THC No     CBD No     Flavoring No     Other No     Unknown No      Social History     Tobacco Use    Smoking status: Former Smoker    Smokeless tobacco: Current User    Tobacco comment: rare/hasn't smoked in 1 year   Vaping Use    Vaping Use: Never used   Substance Use Topics    Alcohol use: Not Currently     Alcohol/week: 1 0 standard drink     Types: 1 Standard drinks or equivalent per week     Comment: social    Drug use: No        Review of Systems   Constitutional: Negative for chills, fatigue and fever  HENT: Negative for congestion and rhinorrhea  Eyes: Negative for visual disturbance  Respiratory: Positive for shortness of breath  Negative for cough, chest tightness and wheezing  Cardiovascular: Positive for leg swelling  Negative for chest pain and palpitations  Gastrointestinal: Negative for abdominal distention, abdominal pain, diarrhea, nausea and vomiting  Endocrine: Negative for polyuria  Genitourinary: Negative for decreased urine volume, dysuria and flank pain  Musculoskeletal: Negative for arthralgias and joint swelling  Skin: Negative for rash  Neurological: Negative for syncope, weakness, light-headedness and headaches  Hematological: Does not bruise/bleed easily  Psychiatric/Behavioral: Negative for behavioral problems and confusion  All other systems reviewed and are negative        Physical Exam  ED Triage Vitals [04/08/22 0932]   Temperature Pulse Respirations Blood Pressure SpO2   98 4 °F (36 9 °C) 83 20 130/92 96 %      Temp src Heart Rate Source Patient Position - Orthostatic VS BP Location FiO2 (%)   -- Monitor -- -- --      Pain Score       No Pain             Orthostatic Vital Signs  Vitals:    22 0932 22 1052   BP: 130/92    Pulse: 83 78       Physical Exam  Vitals and nursing note reviewed  Constitutional:       General: He is not in acute distress  Appearance: He is well-developed  He is obese  He is not ill-appearing, toxic-appearing or diaphoretic  HENT:      Head: Normocephalic and atraumatic  Neck:      Vascular: No hepatojugular reflux or JVD  Cardiovascular:      Rate and Rhythm: Normal rate and regular rhythm  Heart sounds: No murmur heard  Pulmonary:      Effort: Pulmonary effort is normal  No respiratory distress  Breath sounds: Wheezing (diffuse end expiratory) present  No rales  Abdominal:      General: Bowel sounds are normal       Palpations: Abdomen is soft  Tenderness: There is no abdominal tenderness  There is no guarding or rebound  Musculoskeletal:      Right lower le+ Pitting Edema present  Left lower le+ Pitting Edema present  Skin:     General: Skin is warm  Capillary Refill: Capillary refill takes less than 2 seconds  Neurological:      Mental Status: He is alert and oriented to person, place, and time           ED Medications  Medications   albuterol inhalation solution 10 mg (10 mg Nebulization Given 22 1052)       Diagnostic Studies  Results Reviewed     Procedure Component Value Units Date/Time    HS Troponin 0hr (reflex protocol) [[id number]]  (Normal) Collected: 22 110    Lab Status: Final result Specimen: Blood from Arm, Left Updated: 22 1141     hs TnI 0hr 11 ng/L     Basic metabolic panel [[id number]]  (Abnormal) Collected: 22 1106    Lab Status: Final result Specimen: Blood from Arm, Left Updated: 22 1136     Sodium 139 mmol/L      Potassium 4 4 mmol/L      Chloride 104 mmol/L      CO2 28 mmol/L      ANION GAP 7 mmol/L      BUN 17 mg/dL      Creatinine 1 65 mg/dL      Glucose 111 mg/dL Calcium 9 1 mg/dL      eGFR 46 ml/min/1 73sq m     Narrative:      [place] guidelines for Chronic Kidney Disease (CKD):     Stage 1 with normal or high GFR (GFR > 90 mL/min/1 73 square meters)    Stage 2 Mild CKD (GFR = 60-89 mL/min/1 73 square meters)    Stage 3A Moderate CKD (GFR = 45-59 mL/min/1 73 square meters)    Stage 3B Moderate CKD (GFR = 30-44 mL/min/1 73 square meters)    Stage 4 Severe CKD (GFR = 15-29 mL/min/1 73 square meters)    Stage 5 End Stage CKD (GFR <15 mL/min/1 73 square meters)  Note: GFR calculation is accurate only with a steady state creatinine    NT-BNP PRO [[id number]]  (Abnormal) Collected: 04/08/22 1106    Lab Status: Final result Specimen: Blood from Arm, Left Updated: 04/08/22 1136     NT-proBNP 609 pg/mL     CBC and Platelet [[id number]]  (Abnormal) Collected: 04/08/22 1106    Lab Status: Final result Specimen: Blood from Arm, Left Updated: 04/08/22 1117     WBC 11 27 Thousand/uL      RBC 4 13 Million/uL      Hemoglobin 12 4 g/dL      Hematocrit 37 0 %      MCV 90 fL      MCH 30 0 pg      MCHC 33 5 g/dL      RDW 14 1 %      Platelets 526 Thousands/uL      MPV 10 9 fL                  XR chest 2 views   ED Interpretation by Janny Davenport MD (04/08 1219)   No pneumonia      Final Result by Grecia Melgoza MD (04/08 1139)      No acute cardiopulmonary disease  Workstation performed: [id number]               Procedures  Procedures      ED Course  ED Course as of 04/09/22 0415   Fri Apr 08, 2022   1021 Procedure Note: EKG  Date/Time: 04/08/22 10:21 AM   Interpreted by: Shelia Dooley DO  Indications / Diagnosis: Dyspnea  ECG reviewed by me, the ED Provider: yes   The EKG demonstrates:  Rhythm: normal sinus rhythm 81 BPM  Intervals: Normal MN and QT intervals  Axis: Normal axis  QRS/Blocks: Normal QRS  ST Changes: No acute ST/T waves changes  No MADYSON   No TWI             HEART Risk Score      Most Recent Value   Heart Score Risk Calculator    History 0 Filed at: 04/09/2022 0414   ECG 1 Filed at: 04/09/2022 0414   Age 1 Filed at: 04/09/2022 0414   Risk Factors 2 Filed at: 04/09/2022 0414   Troponin 0 Filed at: 04/09/2022 0414   HEART Score 4 Filed at: 04/09/2022 0414                      SBIRT 22yo+      Most Recent Value   SBIRT (23 yo +)    In order to provide better care to our patients, we are screening all of our patients for alcohol and drug use  Would it be okay to ask you these screening questions? Unable to answer at this time Filed at: 04/08/2022 0934                MDM  Number of Diagnoses or Management Options  ANTONIO (acute kidney injury) ([facility] [address] )  Chronic diastolic heart failure (HCC)  Dyspnea on exertion  Diagnosis management comments:   49 y o  male presenting for dyspnea and lower extremity edema  Wheezing noted on exam, no rales appreciated  Will order CBC, CMP, troponin, BNP, EKG, CXR to evaluate for ACS, PTX, pulmonary disease, widened mediastinum, CHF exacerbation  Doubt PE as patient compliant with eliquis daily  Reassessment: labs and imaging reviewed with patient  Patient reportedly at dry weight and BNP borderline  Patient evaluated by cardiology Dr Mounika Sow who states okay for discharge, recommends increasing bumex dosing to 2mg in AM/1mg in PM     D/w case management who arranged for appointment with HF team next week  I have discussed with the patient our plan to discharge them from the ED and the patient is in agreement with this plan  The patient was provided a written after visit summary with strict RTED precautions  Discharge Plan: increase bumex dosing per cardiology recs, o/p f/u in one week  Followup: I have discussed with the patient plan to follow up with cardiology   Contact information provided in AVS        Amount and/or Complexity of Data Reviewed  Clinical lab tests: ordered and reviewed  Tests in the radiology section of CPT®: ordered and reviewed  Review and summarize past medical records: yes  Discuss the patient with other providers: yes  Independent visualization of images, tracings, or specimens: yes    Patient Progress  Patient progress: stable      Disposition  Final diagnoses:   Dyspnea on exertion   Ch[facility] ([facility]   ANTONIO (acute kidney injury) ([facility] )     Time reflects when diagnosis was documented in both MDM as applicable and the Disposition within this note     Time User Action Codes Description Comment    4/8/2022 12:12 PM Kevon Kurtz Add [R06 00] Dyspnea on exertion     4/8/2022 12:12 PM Kevon Kurtz Add [C29 52] Chronic diastolic heart failure ([facility] )     4/8/2022 12:13 PM Kevon Kurtz Add [N17 9] ANTONIO (acute kidney injury) [facility])       ED Disposition     ED Disposition Condition Date/Time Comment    Discharge Stable Fri Apr 8, 2022 12:12 PM Rafy Tremonton discharge to home/self care  Follow-up Information     Follow up With Specialties Details Why Contact Info    Meir Pearson MD Cardiology, Internal Medicine Schedule an appointment as soon as possible for a visit  To make appointment for reevaluation in 3-5 days   [address]            Discharge Medication List as of 4/8/2022 12:23 PM      CONTINUE these medications which have NOT CHANGED    Details   Accu-Chek FastClix Lancets MISC Test blood sugar twice daily, Normal      albuterol (PROVENTIL HFA,VENTOLIN HFA) 90 mcg/act inhaler Inhale 2 puffs every 4 (four) hours as needed for wheezing or shortness of breath, Starting Mon 2/28/2022, Normal      apixaban (Eliquis) 5 mg Take 1 tablet (5 mg total) by mouth 2 (two) times a day, Starting Wed 3/23/2022, Normal      atorvastatin (LIPITOR) 10 mg tablet TAKE 1 TABLET BY MOUTH EVERY DAY, Normal      Blood Glucose Monitoring Suppl (Accu-Chek Guide) w/Device KIT Use 2 (two) times a day Test blood sugar twice daily, Starting Thu 8/5/2021, Normal      budesonide-formoterol (Symbicort) 160-4 5 mcg/act inhaler Inhale 2 puffs 2 (two) times a day Rinse mouth after use , Starting Thu 2/3/2022, Normal      bumetanide (BUMEX) 1 mg tablet Take 1 tablet (1 mg total) by mouth 2 (two) times a day, Starting Thu 4/7/2022, No Print      glucose blood (Accu-Chek Guide) test strip Test blood sugar twice daily, Normal      guaiFENesin (MUCINEX) 600 mg 12 hr tablet Take 1 tablet (600 mg total) by mouth 2 (two) times a day, Starting Mon 11/15/2021, Normal      levalbuterol (Xopenex) 1 25 mg/3 mL nebulizer solution Take 3 mL (1 25 mg total) by nebulization 3 (three) times a day, Starting Mon 11/1/2021, Normal      metFORMIN (GLUCOPHAGE) 500 mg tablet Take 1 tablet (500 mg total) by mouth 2 (two) times a day After meals, Starting Mon 2/28/2022, Normal      metoprolol succinate (TOPROL-XL) 50 mg 24 hr tablet Take 1 tablet (50 mg total) by mouth daily, Starting Mon 3/14/2022, Normal      montelukast (SINGULAIR) 10 mg tablet Take 1 tablet (10 mg total) by mouth daily at bedtime, Starting Thu 2/3/2022, Until Mon 3/14/2022, Normal      MULTIPLE VITAMINS-CALCIUM PO Take 1 tablet by mouth daily, Starting Tue 2/23/2016, Historical Med      potassium chloride (K-DUR,KLOR-CON) 20 mEq tablet Take 1 tablet (20 mEq total) by mouth 2 (two) times a day, Starting Thu 4/7/2022, No Print      sitaGLIPtin (Januvia) 100 mg tablet Take 1 tablet (100 mg total) by mouth daily, Starting Mon 2/28/2022, Normal      sodium chloride (OCEAN) 0 65 % nasal spray 1 spray into each nostril 3 (three) times a day, Starting Mon 11/15/2021, Normal      tiotropium (Spiriva Respimat) 1 25 MCG/ACT AERS inhaler Inhale 2 puffs daily, Starting Thu 2/3/2022, Normal           No discharge procedures on file  PDMP Review       Value Time User    PDMP Reviewed  Yes 9/23/2021 12:07 AM Cheryln Folk, DO           ED Provider  Attending physically available and evaluated Brandin Schultz I managed the patient along with the ED Attending      Electronically Signed by         Breanne Lopez   04/09/22 0373

## 2022-04-08 NOTE — DISCHARGE INSTRUCTIONS
You have been seen for shortness of breath  Please continue your current bumex regimen 2mg am and 1mg pm  Return to the emergency department if you develop worsening trouble breathing, chest pain, lightheadedness or any other symptoms of concern  Please follow up with your cardiologist by calling the number provided

## 2022-04-08 NOTE — ED NOTES
2 staff member have attempted blood work   Dr Hemanth Rodríguez is at bedside with ultrasound      Zoë Aranda, RN  04/08/22 0315

## 2022-04-08 NOTE — TELEPHONE ENCOUNTER
I called Keon and l/m to c/b office  D/c p/w did not mention increasing Bumex to 2 mg am/1 pm afternoon  Potassium 40 meq am/20 meq afternoon  I wanted to make sure patient aware of recommendations  Keon called back tearful  Stating he does not feel well  Can't walk 10 ft w/o SOB  Reviewed ED visit in depth  Did not feel r/t HF  Patient states he will go to [facility] ED for eval for second opinion  Advised Keon I will make Dr Devi Gomez aware      Offered appt on Tuesday w/ Eric Cranker, possibly on Monday

## 2022-04-08 NOTE — PROGRESS NOTES
Pulmonary Follow Up Note  Alvaro Gudino 49 y o  male MRN: [id number]  4/8/2022      HPI:    Patient states that for the last several days he is been insidiously getting more short of breath with even mild exertion  He can no longer walk even 1 block without feeling short of breath  He notes that his lower extremities have become increasingly edematous during this time  He cannot lie flat without feeling chest tightness and wheezing  He requires sleeping at a 75 degree angle  He also has an intermittent cough with clear colored sputum  No fevers or chills  No recent respiratory infections    Exercise Tolerance:  Poor  Less than 1 block       Meds:  Symbicort 160 twice a day  Spiriva daily  Albuterol as needed, every 4 hours over the last week (with mild improvement)    ROS:  Constitutional: - Fatigue, - chills, - fever, - weight change  HEENT: - rhinorrhea, - sneezing, - sore throat  Respiratory:  + cough, + shortness of breath, - wheezing  Cardiovascular: - chest pain,  -palpitations, - leg swelling  Gastrointestinal: - abdominal pain, - constipation, - diarrhea, - nausea, - vomiting  Endocrine: - cold intolerance, - heat intolerance  Genitourinary: - dysuria  Musculoskeletal: - arthralgias  Skin:- rash, - wound  Allergic/Immunologic: - allergies  Neurological: - dizziness, - numbness        Vitals: Blood pressure 120/74, pulse 88, temperature 98 2 °F (36 8 °C), temperature source Tympanic, resp  rate 20, height 6' (1 829 m), weight (!) 139 kg (306 lb), SpO2 92 %  , Body mass index is 41 5 kg/m²      Physical Exam:  GEN mild respiratory distress  HEENT  ncat, non icteric, MM moist  NECK  supple, no JVD, no LAD  CV  +s1s2, no mrg, RRR  PULM +end expiratory wheeze, no rhonchi, no rales  ABD  soft, nt, obese, + BS  EXT 2+ lower extremity pitting edema, no cyanosis, no clubbing  NEURO  Aox3, no focal weakness    Pulmonary function testing:   Point of care 6 minute walk test resulted in a funmi oxygen saturation of 89% on ambient air, however he was only able to ambulate 126 m over 3 minutes before stopping due to significant shortness of breath and chest tightness    Assessment:  Acute exacerbation of CHF  Asthma, unknown severity, not in exacerbation  Morbid obesity    Plan:  · I recommended emergency department evaluation given significant shortness of breath that has been worsening over the last week  · I suspect that the patient has an acute exacerbation of CHF causing this dyspnea on exertion  · I do not recommend systemic corticosteroids at this time as I do not feel that he is having a significant exacerbation of asthma  · He does have significant allergic triggers including dust mites and dogs however timing of his symptoms are inconsistent with allergy induced asthma exacerbation  · Patient will be seek evaluation in the emergency department   Return visit after discharge from hospital    MARLIN Stuart    [facility]    Answers for HPI/ROS submitted by the patient on 4/7/2022  Do you have difficulty breathing?: Yes  Do you have shortness of breath?: Yes  Chronicity: new  When did you first notice your symptoms?: in the past 7 days  How often do your symptoms occur?: constantly  Since you first noticed this problem, how has it changed?: unchanged  Have you had a change in appetite?: No  Do you have chest pain?: No  Do you have shortness of breath that occurs with effort or exertion?: Yes  Do you have ear congestion?: No  Do you have ear pain?: No  Do you have a fever?: No  Do you have headaches?: No  Do you have heartburn?: No  Do you have fatigue?: No  Do you have muscle pain?: No  Do you have nasal congestion?: No  Do you have shortness of breath when lying flat?: Yes  Do you have shortness of breath when you wake up?: Yes  Do you have post-nasal drip?: No  Do you have a runny nose?: No  Do you have sneezing?: No  Do you have a sore throat?: No  Do you have sweats?: No  Do you have trouble swallowing?: No  Have you experienced weight loss?: No  Which of the following makes your symptoms worse?: any activity, climbing stairs, exercise, lying down  Which of the following makes your symptoms better?: rest

## 2022-04-08 NOTE — ED ATTENDING ATTESTATION
Final Diagnosis:  1  Dyspnea on exertion    2  Chronic diastolic heart failure ([address] )    3  ANTONIO (acute kidney injury) [facility])      ED Course as of 04/08/22 1220   Fri Apr 08, 2022   1054 POCUS Cardiac:  - echo; transthoracic echocardiogram was performed at bedside    - Images collected were adequate  - This was a technically difficult study due to lung interference  - Apical, parasternal, subcostal and suprasternal views were obtained  - Findings: There was no obvious pericardial effusion   EF mildly reduced   IVC was <2cm and spontnaeously compressing   No B-lines anteriorly  Some images collected for educational purposes and demonstrated:   E 39  A 49  E' 7  E/A: 0 79 (<0 8)  E/E': 5 8 (<8)   1107 Procedure Note for Ultrasound Guided Peripheral Line:  Skin prepared using Chloraprep  In the LEFT basilic vein, using ultrasound guidance (CPT code 95216), an 18G peripheral IV long angiocatheter was placed successfully by physician secondary to difficulty placing IV by nursing staff  Successful  One attempt  Good blood return  Good palpable flush  Adhered to skin using Tegederm  U/S image in chart  21  Discussed with cardiology  Creatinine is stable  BNP not elevated much  EKG stable  POCUS Cardiac reassuring  Story doesn't fit with CHF  Has wheezing  Will DC, close f/u, RTER precautions  Wilburn Ganser MD, saw and evaluated the patient  All available labs and X-rays were ordered by me or the resident and have been reviewed by myself  I discussed the patient with the resident / non-physician and agree with the resident's / non-physician practitioner's findings and plan as documented in the resident's / non-physician practicitioner's note, except where noted  At this point, I agree with the current assessment done in the ED  I was present during key portions of all procedures performed unless otherwise stated       Chief Complaint   Patient presents with   [facility] Shortness of Breath     pt c o increased sob and swollen lower extremity since Monday  This is a 49 y o  male presenting for evaluation of 1 week of conversational dyspnea, increased LE edema  Seems pulmonary, seen today, thought to be CHF so sent him in  No f/ch/n/v  No CP  Compliant with medications (bumex)  He's been told when he feels like this isn't CHF in the past      Wt Readings from Last 6 Encounters:   04/08/22 (!) 139 kg (306 lb)   03/16/22 (!) 146 kg (322 lb 3 2 oz)   03/14/22 (!) 144 kg (317 lb 8 oz)   03/10/22 (!) 140 kg (309 lb 4 9 oz)   02/22/22 (!) 143 kg (315 lb 1 6 oz)   02/12/22 (!) 145 kg (319 lb)     PMH:   has a past medical history of Acute gastritis without hemorrhage, Asthma, Diabetes mellitus ([address] ), Gastric bypass status for obesity, Hyperlipidemia, Hypertension, Impaired fasting glucose, Obesity, and Viral gastroenteritis  PSH:   has a past surgical history that includes Gastric bypass (2004); Hernia repair; Carpal tunnel release; Tonsillectomy; and Cardiac catheterization (N/A, 3/9/2022)  Social:  Social History     Substance and Sexual Activity   Alcohol Use Not Currently    Alcohol/week: 1 0 standard drink    Types: 1 Standard drinks or equivalent per week    Comment: social     Social History     Tobacco Use   Smoking Status Former Smoker   Smokeless Tobacco Current User   Tobacco Comment    rare/hasn't smoked in 1 year     Social History     Substance and Sexual Activity   Drug Use No     PE:  Vitals:    04/08/22 0932 04/08/22 1052   BP: 130/92    Pulse: 83 78   Resp: 20 20   Temp: 98 4 °F (36 9 °C)    SpO2: 96% 99%   General: VSS, NAD, awake, alert  Well-nourished, well-developed  Appears stated age  Head: Normocephalic, atraumatic, nontender  Eyes: PERRL, EOM-I  No diplopia  No hyphema  No subconjunctival hemorrhages  Symmetrical lids  ENTAtraumatic external nose and ears  MMM  No stridor  Normal phonation  No drooling     Base of mouth is soft  No mastoid tenderness  Neck: Symmetric, trachea midline  +JVD  CV: Peripheral pulses +2 throughout  No chest wall tenderness  Lungs:   Unlabored   No retractions  No crepitus  No tachypnea  No paradoxical motion  Abd: +BS, soft, NT/ND    MSK:   FROM   1+ pitting lower extremity edema  Skin: Dry, intact  Neuro: AAOx3, GCS 15, CN II-XII grossly intact  Motor grossly intact  Psychiatric/Behavioral: Appropriate mood and affect   Exam: deferred  A:  - Dyspnea  - Wheezing  - CHF??  P:  - Cardiac workup  - Neb treatment  - re-evaluate  - I think this is primarily asthma, not CHF ? trial neb  - CHF fellow at bedside  - 13 point ROS was performed and all are normal unless stated in the history above  - Nursing note reviewed  Vitals reviewed  - Orders placed by myself and/or advanced practitioner / resident     - Previous chart was reviewed  - No language barrier    - History obtained from patient  - There are no limitations to the history obtained  - Critical care time: Not applicable for this patient  Code Status: Prior  Advance Directive and Living Will:      Power of :    POLST:      Medications   albuterol inhalation solution 10 mg (10 mg Nebulization Given 4/8/22 1052)     XR chest 2 views   ED Interpretation   No pneumonia      Final Result      No acute cardiopulmonary disease                    Workstation performed: [id number]           Orders Placed This Encounter   Procedures    XR chest 2 views    CBC and Platelet    Basic metabolic panel    HS Troponin 0hr (reflex protocol)    NT-BNP PRO    HS Troponin I 2hr    HS Troponin I 4hr    ECG 12 lead     Labs Reviewed   CBC AND PLATELET - Abnormal       Result Value Ref Range Status    WBC 11 27 (*) 4 31 - 10 16 Thousand/uL Final    RBC 4 13  3 88 - 5 62 Million/uL Final    Hemoglobin 12 4  12 0 - 17 0 g/dL Final    Hematocrit 37 0  36 5 - 49 3 % Final    MCV 90  82 - 98 fL Final    MCH 30 0  26 8 - 34 3 pg Final    MCHC 33 5  31 4 - 37 4 g/dL Final    RDW 14 1  11 6 - 15 1 % Final    Platelets 456  871 - 390 Thousands/uL Final    MPV 10 9  8 9 - 12 7 fL Final   BASIC METABOLIC PANEL - Abnormal    Sodium 139  136 - 145 mmol/L Final    Potassium 4 4  3 5 - 5 3 mmol/L Final    Chloride 104  100 - 108 mmol/L Final    CO2 28  21 - 32 mmol/L Final    ANION GAP 7  4 - 13 mmol/L Final    BUN 17  5 - 25 mg/dL Final    Creatinine 1 65 (*) 0 60 - 1 30 mg/dL Final    Comment: Standardized to IDMS reference method    Glucose 111  65 - 140 mg/dL Final    Comment: If the patient is fasting, the ADA then defines impaired fasting glucose as > 100 mg/dL and diabetes as > or equal to 123 mg/dL  Specimen collection should occur prior to Sulfasalazine administration due to the potential for falsely depressed results  Specimen collection should occur prior to Sulfapyridine administration due to the potential for falsely elevated results  Calcium 9 1  8 3 - 10 1 mg/dL Final    eGFR 46  ml/min/1 73sq m Final    Narrative:     [place] guidelines for Chronic Kidney Disease (CKD):     Stage 1 with normal or high GFR (GFR > 90 mL/min/1 73 square meters)    Stage 2 Mild CKD (GFR = 60-89 mL/min/1 73 square meters)    Stage 3A Moderate CKD (GFR = 45-59 mL/min/1 73 square meters)    Stage 3B Moderate CKD (GFR = 30-44 mL/min/1 73 square meters)    Stage 4 Severe CKD (GFR = 15-29 mL/min/1 73 square meters)    Stage 5 End Stage CKD (GFR <15 mL/min/1 73 square meters)  Note: GFR calculation is accurate only with a steady state creatinine   NT-BNP PRO (BRAIN NATRIURETIC PEPTIDE) - Abnormal    NT-proBNP 609 (*) <125 pg/mL Final   HS TROPONIN I 0HR - Normal    hs TnI 0hr 11  "Refer to ACS Flowchart"- see link ng/L Final    Comment:                                              Initial (time 0) result  If >=50 ng/L, Myocardial injury suggested ;  Type of myocardial injury and treatment strategy  to be determined    If 5-49 ng/L, a delta result at 2 hours and or 4 hours will be needed to further evaluate  If <4 ng/L, and chest pain has been >3 hours since onset, patient may qualify for discharge based on the HEART score in the ED  If <5 ng/L and <3hours since onset of chest pain, a delta result at 2 hours will be needed to further evaluate  HS Troponin 99th Percentile URL of a Health Population=12 ng/L with a 95% Confidence Interval of 8-18 ng/L  Second Troponin (time 2 hours)  If calculated delta >= 20 ng/L,  Myocardial injury suggested ; Type of myocardial injury and treatment strategy to be determined  If 5-49 ng/L and the calculated delta is 5-19 ng/L, consult medical service for evaluation  Continue evaluation for ischemia on ecg and other possible etiology and repeat hs troponin at 4 hours  If delta is <5 ng/L at 2 hours, consider discharge based on risk stratification via the HEART score (if in ED), or ISATU risk score in IP/Observation  HS Troponin 99th Percentile URL of a Health Population=12 ng/L with a 95% Confidence Interval of 8-18 ng/L    HS TROPONIN I 2HR   HS TROPONIN I 4HR     Time reflects when diagnosis was documented in both MDM as applicable and the Disposition within this note     Time User Action Codes Description Comment    4/8/2022 12:12 PM Caro To Add [R06 00] Dyspnea on exertion     4/8/2022 12:12 PM Caro To Add [J74 79] Chronic diastolic heart failure ([address] )     4/8/2022 12:13 PM Caro To Add [N17 9] ANTONIO (acute kidney injury) [facility])       ED Disposition     ED Disposition Condition Date/Time Comment    Discharge Stable Fri Apr 8, 2022 12:12 PM Bryn Chauvin discharge to home/self care  Follow-up Information     Follow up With Specialties Details Why Contact Info    Mary Dixon MD Cardiology, Internal Medicine Schedule an appointment as soon as possible for a visit  To make appointment for reevaluation in 3-5 days   [phone]  [phone]          Patient's Medications   Discharge Prescriptions    No medications on file     No discharge procedures on file  Prior to Admission Medications   Prescriptions Last Dose Informant Patient Reported? Taking? Accu-Chek FastClix Lancets MISC  Self No No   Sig: Test blood sugar twice daily   Blood Glucose Monitoring Suppl (Accu-Chek Guide) w/Device KIT  Self No No   Sig: Use 2 (two) times a day Test blood sugar twice daily   MULTIPLE VITAMINS-CALCIUM PO  Self Yes No   Sig: Take 1 tablet by mouth daily   albuterol (PROVENTIL HFA,VENTOLIN HFA) 90 mcg/act inhaler  Self No No   Sig: Inhale 2 puffs every 4 (four) hours as needed for wheezing or shortness of breath   apixaban (Eliquis) 5 mg  Self No No   Sig: Take 1 tablet (5 mg total) by mouth 2 (two) times a day   atorvastatin (LIPITOR) 10 mg tablet  Self No No   Sig: TAKE 1 TABLET BY MOUTH EVERY DAY   budesonide-formoterol (Symbicort) 160-4 5 mcg/act inhaler  Self No No   Sig: Inhale 2 puffs 2 (two) times a day Rinse mouth after use     bumetanide (BUMEX) 1 mg tablet  Self No No   Sig: Take 1 tablet (1 mg total) by mouth 2 (two) times a day   glucose blood (Accu-Chek Guide) test strip  Self No No   Sig: Test blood sugar twice daily   guaiFENesin (MUCINEX) 600 mg 12 hr tablet  Self No No   Sig: Take 1 tablet (600 mg total) by mouth 2 (two) times a day   levalbuterol (Xopenex) 1 25 mg/3 mL nebulizer solution  Self No No   Sig: Take 3 mL (1 25 mg total) by nebulization 3 (three) times a day   Patient taking differently: Take 1 25 mg by nebulization as needed     metFORMIN (GLUCOPHAGE) 500 mg tablet  Self No No   Sig: Take 1 tablet (500 mg total) by mouth 2 (two) times a day After meals   metoprolol succinate (TOPROL-XL) 50 mg 24 hr tablet  Self No No   Sig: Take 1 tablet (50 mg total) by mouth daily   montelukast (SINGULAIR) 10 mg tablet  Self No No   Sig: Take 1 tablet (10 mg total) by mouth daily at bedtime   potassium chloride (K-DUR,KLOR-CON) 20 mEq tablet  Self No No   Sig: Take 1 tablet (20 mEq total) by mouth 2 (two) times a day   sitaGLIPtin (Januvia) 100 mg tablet  Self No No   Sig: Take 1 tablet (100 mg total) by mouth daily   sodium chloride (OCEAN) 0 65 % nasal spray  Self No No   Si spray into each nostril 3 (three) times a day   tiotropium (Spiriva Respimat) 1 25 MCG/ACT AERS inhaler  Self No No   Sig: Inhale 2 puffs daily      Facility-Administered Medications: None       Portions of the record may have been created with voice recognition software  Occasional wrong word or "sound a like" substitutions may have occurred due to the inherent limitations of voice recognition software  Read the chart carefully and recognize, using context, where substitutions have occurred      Electronically signed by:  Omar De Leon

## 2022-04-08 NOTE — Clinical Note
Quita Nascimento was seen and treated in our emergency department on 4/8/2022  No restrictions            Diagnosis: dyspnea    Pato Trejo  is off the rest of the shift today  He may return on this date: If you have any questions or concerns, please don't hesitate to call        Yvonne Guerrero, DO    ______________________________           _______________          _______________  Hospital Representative                              Date                                Time

## 2022-04-11 ENCOUNTER — TELEPHONE (OUTPATIENT)
Dept: CARDIOLOGY CLINIC | Facility: CLINIC | Age: 57
End: 2022-04-11

## 2022-04-11 NOTE — TELEPHONE ENCOUNTER
Itzel January called to let me know he is home from the hospital  He was admitted at [facility]  O/v Jyothi Freed on Friday  CardioMEM readings to resume tomorrow

## 2022-04-12 LAB
ATRIAL RATE: 117 BPM
QRS AXIS: 67 DEGREES
QRSD INTERVAL: 76 MS
QT INTERVAL: 386 MS
QTC INTERVAL: 448 MS
T WAVE AXIS: 80 DEGREES
VENTRICULAR RATE: 81 BPM

## 2022-04-12 PROCEDURE — 93010 ELECTROCARDIOGRAM REPORT: CPT | Performed by: INTERNAL MEDICINE

## 2022-04-14 NOTE — PROGRESS NOTES
General Cardiology Outpatient Progress Note    Kathi Elmore 49 y o  male   MRN: [id number]  Encounter: [id number]    Assessment:  Patient Active Problem List    Diagnosis Date Noted    Paroxysmal atrial fibrillation ([place] [address] ) 03/01/2022    Stage 3a chronic kidney disease ([place] [address] ) 12/23/2021    Hypokalemia 11/14/2021    Acute on chronic diastolic heart failure ([place] [address] ) 11/12/2021    Moderate asthma with acute exacerbation 10/11/2021    Dyspnea on exertion 10/11/2021    Hyponatremia 07/31/2021    Cardiomyopathy ([place] [address] ) 07/19/2021    Well adult exam 05/14/2021    Decreased left ventricular systolic function 78/93/7496    Bilateral leg edema 02/19/2020    Edema of both feet 01/23/2020    VICKY (obstructive sleep apnea)     Daytime sleepiness 07/17/2019    Mixed hyperlipidemia 04/18/2019    Morbid obesity ([place] [address] ) 04/18/2019    Vitamin B12 deficiency 04/18/2019    Vitamin D deficiency 04/18/2019    Knee sprain, bilateral 06/14/2018    Primary osteoarthritis of both knees 06/14/2018    Irritable bowel syndrome with diarrhea 01/30/2018    Essential hypertension 01/30/2018    Type 2 diabetes mellitus with hyperglycemia ([place] [address] ) 01/30/2018       Today's Plan:   Continues on PO steroid taper   Continue on Bumex 2 mg qAM and 1 mg qPM     Check BMP next week  Plan:  Chronic HFpEF; LVEF 55%; LVIDd 6 0 cm; NYHA II; ACC/AHA Stage B   TTE 03/25/2020: LVEF 40-45%  LVIDd 6 12 cm  Normal RV  TTE 07/19/2021: LVEF 50%  LVIDd 6 13 cm  Grade 1 DD  Normal RV  Trace MR and TR  Mildly dilated IVC  TTE 11/12/2021: LVEF 55%  LVIDd 6 0 cm  Grade 1 DD  Normal RV  Trace TR     RHC 03/09/2022 (at time of CardioMEMS implant): RA 4  RV 35/4  PA 35/12/21  PCWP 10  PA sat 64%  CO/CI by Travis 5 56/2 2  PVR 1 97  Weight of 306 lbs on 04/11 (day of discharge)  Today, weighs 308 lbs  Most recent BMP from 04/08/2022: sodium 139; potassium 4 4; BUN 17; creatinine 1 65; eGFR 46      Neurohormonal Blockade:  --Beta Blocker: metoprolol succinate 50 mg daily  --ARNi / ACEi / ARB: No    --Aldosterone Antagonist: No    --SGLT2 Inhibitor: No   --Diuretic: Bumex 2 mg qAM and 1 mg qPM with potassium 20 mEq BID  Sudden Cardiac Death Risk Reduction:  --LVEF >35%  Electrical Resynchronization:  --Candidacy for BiV device: narrow QRS  Advanced Therapies: Will continue to monitor  --CardioMEMS implanted 03/09/2022  Atrial fibrillation   [id number] = 3 (HF, HTN, DM)  Diagnosed 02/2022  Anticoagulation on Eliquis  Rate control: BB as above  Rhythm control: No     Hypertension   BP of 108/58 mmHg in office today  Continue on medications as above  Chronic kidney disease, stage II   Baseline creatinine of 1 2-1 4  Most recent BMP from 04/08/2022: sodium 139; potassium 4 4; BUN 17; creatinine 1 65; eGFR 46  Hyperlipidemia   Most recent lipid panel from 10/01/2021: cholesterol 157; TG 90; HDL 66; calculated LDL 73  Continue on atorvastatin 10 mg daily  Diabetes mellitus, type II     Non-insulin dependent  Most recent hemoglobin A1c of 6 3 from 03/07/2022  Obstructive sleep apnea: awaiting new CPAP from company; device was recalled  Chronic respiratory failure  Asthma, moderate persistent   S/p gastric bypass (Samuel-en-Y) surgery   History of tobacco abuse  Carpal tunnel syndrome, bilateral     HPI:   Tricia Valiente is a 51-year-old man with a PMH as above who presents to the office for follow-up  Admitted to [facility] from 12/23 to 01/01/2022 after presenting with worsening SOB  Started on IV Lasix (later switched to IV Bumex) and IV steroids  Did receive 2 doses of metolazone while inpatient  Transitioned to PO torsemide on day of discharge  Lost 12 lbs and net negative 15 L this admission  Presented to [facility]- ED on 02/12/2022 with worsening SOB  Diagnosed with Afib and was started on Eliquis and BB  Also received 80 mg IV Lasix, and was discharged home       02/22/2022: Patient presents for hospital follow-up  Has been feeling "good" until yesterday  Developed TELLES and noted orthopnea overnight  Also with recently worsening of cough resulting in increased use of PRN inhalers/nebulizers  Denies recent dietary indiscretion  Drinking no more than 2000 mL daily  Is completing daily weights; reports down-trending weights over past few days  Has noted slight decrease in response to PO torsemide  Admitted to [facility] from 03/01 to 03/10/2022 after presenting with worsening SOB and LE swelling  Started on IV Lasix and IV steroids  Later was switched to IV Bumex on 03/04  Transitioned to PO Bumex on 03/07  CardioMEMS implanted on 03/09  Lost 7 lbs and net negative 8 1 L this admission  03/14/2022: Patient presents for hospital follow-up  No current complaints  Weights stable at home in low 310s lbs range  No issues with CardioMEMS system at home  Still waiting for new CPAP machine  Presented to [facility] ED on 04/08 at the direction of his pulmonologist due to worsening SOB and TELLES, productive cough, and wheezing  In ED received albuterol nebulizer treatment  CXR without acute cardiopulmonary disease  Heart failure team recommended increasing Bumex to 2 mg qAM and 1 mg qPM with close outpatient follow-up  Contacted by HF RN on 04/08 to review diuretic dosing changes  Patient reported no improvement since ED visit and presented to [address] [place] [address] ED for second opinion/further evaluation  Admitted to [address] [place] [address] from 04/08 to 04/11/2022  Started on IV steroids and IV Lasix  Diagnosed with acute asthma and HF exacerbations  Lost 2 lbs this admission  Discharged on PO steroid taper  04/15/2022: Patient presents for hospital follow-up appointment  Reviewed recent hospital course(s)  No current complaints  Has continued taking Bumex 2 mg qAm and 1 mg qPM  Is completing daily weights and denies any significant weight gain since returning home   Still waiting on having new CPAP machine delivered  Planning to return to work next week  Past Medical History:   Diagnosis Date    Acute gastritis without hemorrhage     last assessed 3/24/17    Asthma     Diabetes mellitus ([facility] [address] )     Gastric bypass status for obesity     Hyperlipidemia     Hypertension     Impaired fasting glucose     last assessed 5/10/17    Obesity     Viral gastroenteritis     last assessed 11/4/16       Review of Systems   Constitutional: Negative for activity change, appetite change, fatigue, fever and unexpected weight change  HENT: Negative for congestion, postnasal drip, rhinorrhea, sneezing, sore throat and trouble swallowing  Eyes: Negative  Respiratory: Negative for cough, chest tightness and shortness of breath  Cardiovascular: Negative for chest pain, palpitations and leg swelling  Gastrointestinal: Negative for abdominal distention, abdominal pain, diarrhea, nausea and vomiting  Endocrine: Negative  Genitourinary: Negative for decreased urine volume, difficulty urinating, dysuria and urgency  Musculoskeletal: Negative  Skin: Negative  Allergic/Immunologic: Negative  Neurological: Negative for dizziness, tremors, syncope, weakness, light-headedness and headaches  Hematological: Negative  Psychiatric/Behavioral: Negative for agitation, confusion and sleep disturbance  The patient is not nervous/anxious  14-point ROS completed and negative except as stated above and/or in the HPI      Allergies   Allergen Reactions    Azithromycin Other (See Comments)     Shaky, uneasy feeling     Bactrim [Sulfamethoxazole-Trimethoprim] Other (See Comments)     shakiness         Current Outpatient Medications:     Accu-Chek FastClix Lancets MISC, Test blood sugar twice daily, Disp: 200 each, Rfl: 3    albuterol (PROVENTIL HFA,VENTOLIN HFA) 90 mcg/act inhaler, Inhale 2 puffs every 4 (four) hours as needed for wheezing or shortness of breath, Disp: 8 g, Rfl: 3    apixaban (Eliquis) 5 mg, Take 1 tablet (5 mg total) by mouth 2 (two) times a day, Disp: 60 tablet, Rfl: 5    atorvastatin (LIPITOR) 10 mg tablet, TAKE 1 TABLET BY MOUTH EVERY DAY, Disp: 30 tablet, Rfl: 5    Blood Glucose Monitoring Suppl (Accu-Chek Guide) w/Device KIT, Use 2 (two) times a day Test blood sugar twice daily, Disp: 1 kit, Rfl: 0    budesonide-formoterol (Symbicort) 160-4 5 mcg/act inhaler, Inhale 2 puffs 2 (two) times a day Rinse mouth after use , Disp: 10 2 g, Rfl: 2    bumetanide (BUMEX) 1 mg tablet, Take 1 tablet (1 mg total) by mouth 2 (two) times a day (Patient taking differently: Take 1 mg by mouth 2 (two) times a day 2  In the am and 1 at night ), Disp: 180 tablet, Rfl: 3    glucose blood (Accu-Chek Guide) test strip, Test blood sugar twice daily, Disp: 200 strip, Rfl: 3    guaiFENesin (MUCINEX) 600 mg 12 hr tablet, Take 1 tablet (600 mg total) by mouth 2 (two) times a day, Disp: 60 tablet, Rfl: 0    levalbuterol (Xopenex) 1 25 mg/3 mL nebulizer solution, Take 3 mL (1 25 mg total) by nebulization 3 (three) times a day (Patient taking differently: Take 1 25 mg by nebulization as needed  ), Disp: 270 mL, Rfl: 3    metFORMIN (GLUCOPHAGE) 500 mg tablet, Take 1 tablet (500 mg total) by mouth 2 (two) times a day After meals, Disp: 60 tablet, Rfl: 6    metoprolol succinate (TOPROL-XL) 50 mg 24 hr tablet, Take 1 tablet (50 mg total) by mouth daily, Disp: 90 tablet, Rfl: 1    montelukast (SINGULAIR) 10 mg tablet, Take 1 tablet (10 mg total) by mouth daily at bedtime, Disp: 90 tablet, Rfl: 1    MULTIPLE VITAMINS-CALCIUM PO, Take 1 tablet by mouth daily, Disp: , Rfl:     potassium chloride (K-DUR,KLOR-CON) 20 mEq tablet, Take 1 tablet (20 mEq total) by mouth 2 (two) times a day, Disp: 180 tablet, Rfl: 3    sitaGLIPtin (Januvia) 100 mg tablet, Take 1 tablet (100 mg total) by mouth daily, Disp: 30 tablet, Rfl: 1    sodium chloride (OCEAN) 0 65 % nasal spray, 1 spray into each nostril 3 (three) times a day, Disp: 30 mL, Rfl: 0    tiotropium (Spiriva Respimat) 1 25 MCG/ACT AERS inhaler, Inhale 2 puffs daily, Disp: 4 g, Rfl: 3    Social History     Socioeconomic History    Marital status: /Civil Union     Spouse name: Not on file    Number of children: Not on file    Years of education: Not on file    Highest education level: Not on file   Occupational History    Not on file   Tobacco Use    Smoking status: Former Smoker    Smokeless tobacco: Current User    Tobacco comment: rare/hasn't smoked in 1 year   Vaping Use    Vaping Use: Never used   Substance and Sexual Activity    Alcohol use: Not Currently     Alcohol/week: 1 0 standard drink     Types: 1 Standard drinks or equivalent per week     Comment: social    Drug use: No    Sexual activity: Yes     Partners: Female     Birth control/protection: None   Other Topics Concern    Not on file   Social History Narrative    Not on file     Social Determinants of Health     Financial Resource Strain: Not on file   Food Insecurity: No Food Insecurity    Worried About Running Out of Food in the Last Year: Never true    Aria of Food in the Last Year: Never true   Transportation Needs: No Transportation Needs    Lack of Transportation (Medical): No    Lack of Transportation (Non-Medical): No   Physical Activity: Not on file   Stress: Not on file   Social Connections: Not on file   Intimate Partner Violence: Not on file   Housing Stability: Unknown    Unable to Pay for Housing in the Last Year: No    Number of Places Lived in the Last Year: Not on file    Unstable Housing in the Last Year: No     Family History   Problem Relation Age of Onset    Stroke Mother     Hypertension Father        Vitals:   Blood pressure 108/58, pulse 76, height 6' (1 829 m), weight (!) 140 kg (308 lb 1 6 oz), SpO2 98 %  Body mass index is 41 79 kg/m²      Wt Readings from Last 3 Encounters:   04/15/22 (!) 140 kg (308 lb 1 6 oz)   04/08/22 (!) 139 kg (306 lb)   03/16/22 (!) 146 kg (322 lb 3 2 oz)     Vitals:    04/15/22 1253   BP: 108/58   BP Location: Left arm   Patient Position: Sitting   Cuff Size: Large   Pulse: 76   SpO2: 98%   Weight: (!) 140 kg (308 lb 1 6 oz)   Height: 6' (1 829 m)       Physical Exam  Vitals reviewed  Constitutional:       General: He is awake  He is not in acute distress  Appearance: Normal appearance  He is well-developed and overweight  HENT:      Head: Normocephalic  Nose: Nose normal       Mouth/Throat:      Mouth: Mucous membranes are moist    Eyes:      General: No scleral icterus  Conjunctiva/sclera: Conjunctivae normal    Neck:      Vascular: No JVD  Trachea: No tracheal deviation  Cardiovascular:      Rate and Rhythm: Normal rate and regular rhythm  No extrasystoles are present  Pulses: Normal pulses  Heart sounds: No murmur heard  Pulmonary:      Effort: Pulmonary effort is normal  No tachypnea, bradypnea, accessory muscle usage or respiratory distress  Breath sounds: Normal air entry  No decreased air movement  No decreased breath sounds, wheezing or rales  Abdominal:      General: Bowel sounds are normal  There is no distension  Palpations: Abdomen is soft  Tenderness: There is no abdominal tenderness  Musculoskeletal:      Cervical back: Neck supple  Right lower leg: Edema (trace) present  Left lower leg: Edema (trace) present  Skin:     General: Skin is warm and dry  Coloration: Skin is not jaundiced or pale  Findings: Erythema (diffuse facial) present  Neurological:      General: No focal deficit present  Mental Status: He is alert and oriented to person, place, and time  Psychiatric:         Attention and Perception: Attention normal          Mood and Affect: Mood and affect normal          Speech: Speech normal          Behavior: Behavior normal  Behavior is cooperative  Thought Content:  Thought content normal      Labs & Results:  Lab Results   Component Value Date    WBC 11 27 (H) 04/08/2022    HGB 12 4 04/08/2022    HCT 37 0 04/08/2022    MCV 90 04/08/2022     04/08/2022     Lab Results   Component Value Date    SODIUM 139 04/08/2022    K 4 4 04/08/2022     04/08/2022    CO2 28 04/08/2022    BUN 17 04/08/2022    CREATININE 1 65 (H) 04/08/2022    GLUC 111 04/08/2022    CALCIUM 9 1 04/08/2022     No results found for: INR, PROTIME     Lab Results   Component Value Date    NTBNP 609 (H) 04/08/2022      Nicole John PA-C

## 2022-04-15 ENCOUNTER — OFFICE VISIT (OUTPATIENT)
Dept: CARDIOLOGY CLINIC | Facility: CLINIC | Age: 57
End: 2022-04-15
Payer: COMMERCIAL

## 2022-04-15 VITALS
WEIGHT: 308.1 LBS | OXYGEN SATURATION: 98 % | HEIGHT: 72 IN | DIASTOLIC BLOOD PRESSURE: 58 MMHG | SYSTOLIC BLOOD PRESSURE: 108 MMHG | BODY MASS INDEX: 41.73 KG/M2 | HEART RATE: 76 BPM

## 2022-04-15 DIAGNOSIS — Z09 HOSPITAL DISCHARGE FOLLOW-UP: ICD-10-CM

## 2022-04-15 DIAGNOSIS — J45.40 MODERATE PERSISTENT ASTHMA WITHOUT COMPLICATION: ICD-10-CM

## 2022-04-15 DIAGNOSIS — I10 HYPERTENSION, UNSPECIFIED TYPE: ICD-10-CM

## 2022-04-15 DIAGNOSIS — G47.33 OBSTRUCTIVE SLEEP APNEA: ICD-10-CM

## 2022-04-15 DIAGNOSIS — I50.32 CHRONIC HEART FAILURE WITH PRESERVED EJECTION FRACTION (HCC): Primary | ICD-10-CM

## 2022-04-15 DIAGNOSIS — E11.9 TYPE 2 DIABETES MELLITUS WITHOUT COMPLICATION, WITHOUT LONG-TERM CURRENT USE OF INSULIN (HCC): ICD-10-CM

## 2022-04-15 DIAGNOSIS — E78.5 HYPERLIPIDEMIA, UNSPECIFIED HYPERLIPIDEMIA TYPE: ICD-10-CM

## 2022-04-15 PROCEDURE — 99215 OFFICE O/P EST HI 40 MIN: CPT | Performed by: PHYSICIAN ASSISTANT

## 2022-04-15 PROCEDURE — 3078F DIAST BP <80 MM HG: CPT | Performed by: PHYSICIAN ASSISTANT

## 2022-04-15 PROCEDURE — 1036F TOBACCO NON-USER: CPT | Performed by: PHYSICIAN ASSISTANT

## 2022-04-15 PROCEDURE — 3074F SYST BP LT 130 MM HG: CPT | Performed by: PHYSICIAN ASSISTANT

## 2022-04-15 PROCEDURE — 3008F BODY MASS INDEX DOCD: CPT | Performed by: PHYSICIAN ASSISTANT

## 2022-04-15 NOTE — PATIENT INSTRUCTIONS
Continue current medications  Please weigh yourself every day and keep a detailed log of weights  Contact the Heart Failure program at [phone] if you gain 3 lbs overnight or 5 lbs in 5-7 days  Limit daily sodium/salt intake to 3021-0875 mg daily to prevent fluid retention  Avoid canned foods, fast food/Chinese food, and processed meats (hot dogs, lunch meat, and sausage etc )  Limit fluid intake to 2000 mL or 2L (about 60-65 ounces) daily  Bring complete list of medications and log of daily weights to your follow-up appointment

## 2022-04-27 ENCOUNTER — TELEPHONE (OUTPATIENT)
Dept: CARDIOLOGY CLINIC | Facility: CLINIC | Age: 57
End: 2022-04-27

## 2022-04-27 NOTE — TELEPHONE ENCOUNTER
Reviewed cardioMEMS, PAD Monday 20, Tuesday 19 and today 19  Goal 15  Called and s/w Keon, denies any complaints  States he hasn't felt this good in a while  Back to work  Weight stable 306 lbs       Currently taking Bumex 2 mg am and 1 mg pm and Potassium 20 meq BID     4/11 K 4 8, creat 1 59     O/v w/ you 5/9

## 2022-05-18 DIAGNOSIS — E78.2 MIXED HYPERLIPIDEMIA: ICD-10-CM

## 2022-05-18 DIAGNOSIS — E11.65 TYPE 2 DIABETES MELLITUS WITH HYPERGLYCEMIA, WITHOUT LONG-TERM CURRENT USE OF INSULIN (HCC): ICD-10-CM

## 2022-05-18 RX ORDER — ATORVASTATIN CALCIUM 10 MG/1
10 TABLET, FILM COATED ORAL DAILY
Qty: 30 TABLET | Refills: 5 | Status: SHIPPED | OUTPATIENT
Start: 2022-05-18

## 2022-05-26 ENCOUNTER — TELEPHONE (OUTPATIENT)
Dept: FAMILY MEDICINE CLINIC | Facility: CLINIC | Age: 57
End: 2022-05-26

## 2022-05-26 ENCOUNTER — TELEPHONE (OUTPATIENT)
Dept: CARDIOLOGY CLINIC | Facility: CLINIC | Age: 57
End: 2022-05-26

## 2022-05-26 ENCOUNTER — TELEPHONE (OUTPATIENT)
Dept: PULMONOLOGY | Facility: CLINIC | Age: 57
End: 2022-05-26

## 2022-05-26 NOTE — TELEPHONE ENCOUNTER
Keon l/m on HF line c/o SOB requesting return call  CardioMEMS patient    I returned his call  C/o SOB w/ exertion worsening x1 week and cough when he exerts himself  Does cough up clear phlegm  Denies LE edema  Weight stable at 306 lbs    Prescribed Bumex 2mg in am and 1mg in pm but increased on his own to 2mg BID for the past week w/ no relief  He is also taking Potassium 20 meq BID-- did not increase this  Khadijahariana Talavera ordered a BMP at previous visit which he plans on getting Saturday    I r/s'd his no show appt w/ you for 6/24  States he worked late and could not make appt  He is off 6/24  PAD readings as follows; GOAL 15  5/26 16  5/25 19  5/24 20  5/23 14  5/22 16  5/21 16  5/20 -  5/19 -  5/18 15    Sounds like an asthma exacerbation     He wanted to touch base w/ us first before he calls Pulmonary for steroid taper

## 2022-05-26 NOTE — TELEPHONE ENCOUNTER
Patient complains of shortness of breath for 1 week, no chest pain   States reached out to pulmonology and no one had gotten back to him, also has cardiologist

## 2022-05-26 NOTE — TELEPHONE ENCOUNTER
Please call patient  Offer appointment with Dr Lacey Alaniz tomorrow  If SOB worsens, recommend to go to ER for evaluation

## 2022-05-26 NOTE — TELEPHONE ENCOUNTER
Wade Mario would like a return call regarding     What is the reason for the call/chief complaint? SOB    What additional symptoms are present or absent? SOB, yes   Are they on O2? No Pulse O2 resting N/A When walking N/A   chest pain/tightness, no  wheezing, yes when walking  Cough, yes  mucous production,yes  What color is it Clear  fevers/chills, no  weight gain, no  Swelling no  Pain no, does it hurt when breathing or all the time? N/A    When did they start/how long have they been going on? 1 week ago     Constant or intermittent; if intermittent describe yes? Only when walking or moving around     What makes it better or worse? Rest helps     Have you been exposed to anyone that is sick? No    Have you been tested for COVID recently? no    Check current pulmonary medications Nebulizer, Spiriva, Symbicort   frequency of use as prescribed     Have they had any other treatment or testing for this problem elsewhere? Last times was march when addmintted    Recent steroids? Yes, describe: predinose given in march    Recent Antibiotics? No     Last office visit? 4/8/2022    Patient pharmacy?  [address]   30 or 90 day supply 30

## 2022-05-26 NOTE — TELEPHONE ENCOUNTER
Patient calling stating he has been having some shortness of breath and wants to know if we can send in prednisone or if he needs an appt   Please advise [phone]

## 2022-05-26 NOTE — TELEPHONE ENCOUNTER
Patient calling again saying he would really like a call back regarding his issues  He said he is getting frustrated and is suffering  Please advise

## 2022-05-26 NOTE — TELEPHONE ENCOUNTER
Called patient no answer left a message for him to call us back to schedule with Dr Elise Patterson and if symptoms worsen to go to ED

## 2022-05-27 ENCOUNTER — OFFICE VISIT (OUTPATIENT)
Dept: PULMONOLOGY | Facility: CLINIC | Age: 57
End: 2022-05-27
Payer: COMMERCIAL

## 2022-05-27 VITALS
OXYGEN SATURATION: 93 % | HEIGHT: 72 IN | HEART RATE: 86 BPM | TEMPERATURE: 98.1 F | SYSTOLIC BLOOD PRESSURE: 124 MMHG | WEIGHT: 310 LBS | RESPIRATION RATE: 18 BRPM | BODY MASS INDEX: 41.99 KG/M2 | DIASTOLIC BLOOD PRESSURE: 68 MMHG

## 2022-05-27 DIAGNOSIS — J45.901 MODERATE ASTHMA WITH EXACERBATION, UNSPECIFIED WHETHER PERSISTENT: ICD-10-CM

## 2022-05-27 DIAGNOSIS — R06.00 DYSPNEA ON EXERTION: Primary | ICD-10-CM

## 2022-05-27 DIAGNOSIS — J45.51 SEVERE PERSISTENT ASTHMA WITH EXACERBATION: ICD-10-CM

## 2022-05-27 PROCEDURE — 3008F BODY MASS INDEX DOCD: CPT | Performed by: PHYSICIAN ASSISTANT

## 2022-05-27 PROCEDURE — 99214 OFFICE O/P EST MOD 30 MIN: CPT | Performed by: INTERNAL MEDICINE

## 2022-05-27 RX ORDER — PREDNISONE 10 MG/1
TABLET ORAL
Qty: 30 TABLET | Refills: 0 | Status: SHIPPED | OUTPATIENT
Start: 2022-05-27 | End: 2022-06-08

## 2022-05-27 RX ORDER — PREDNISONE 10 MG/1
TABLET ORAL
Qty: 1 TABLET | Refills: 0 | Status: SHIPPED | OUTPATIENT
Start: 2022-05-27 | End: 2022-05-27

## 2022-05-27 NOTE — ASSESSMENT & PLAN NOTE
61-year-old male with history of obesity, congestive heart failure and asthma presents to Pulmonary office for evaluation worsening shortness breath over the past week  His symptoms have response to increase in his diuretics  On examination he has significant posterior wheezes  There is no significant lower extremity edema or evidence of decompensated heart failure  Unclear etiology of what may precipitated the asthma exacerbation  He denies infectious symptoms  Plan   Will treat for asthma exacerbation   Steroid taper  Continue albuterol, Xopenex nebs, Symbicort, Spiriva   Continue heart failure medications  Follow-up in August or sooner if symptoms fail to resolve  Patient does go to the emergency room if he is not  a having benefit from steroids or he develops fevers or worsening symptoms

## 2022-05-27 NOTE — PROGRESS NOTES
Pulmonary Follow Up Note   Rafy [place] 56 y o  male MRN: [id number]  5/27/2022      Assessment:    Moderate asthma with exacerbation  68-year-old male with history of obesity, congestive heart failure and asthma presents to Pulmonary office for evaluation worsening shortness breath over the past week  His symptoms have response to increase in his diuretics  On examination he has significant posterior wheezes  There is no significant lower extremity edema or evidence of decompensated heart failure  Unclear etiology of what may precipitated the asthma exacerbation  He denies infectious symptoms  Plan   Will treat for asthma exacerbation   Steroid taper  Continue albuterol, Xopenex nebs, Symbicort, Spiriva   Continue heart failure medications  Follow-up in August or sooner if symptoms fail to resolve  Patient does go to the emergency room if he is not  a having benefit from steroids or he develops fevers or worsening symptoms  Plan:    Diagnoses and all orders for this visit:    Dyspnea on exertion  -     Discontinue: predniSONE 10 mg tablet; Take 4 tablets (40 mg total) by mouth daily for 3 days, THEN 3 tablets (30 mg total) daily for 3 days, THEN 2 tablets (20 mg total) daily for 3 days, THEN 1 tablet (10 mg total) daily for 3 days  -     predniSONE 10 mg tablet; Take 4 tablets (40 mg total) by mouth daily for 3 days, THEN 3 tablets (30 mg total) daily for 3 days, THEN 2 tablets (20 mg total) daily for 3 days, THEN 1 tablet (10 mg total) daily for 3 days  Severe persistent asthma with exacerbation  -     Discontinue: predniSONE 10 mg tablet; Take 4 tablets (40 mg total) by mouth daily for 3 days, THEN 3 tablets (30 mg total) daily for 3 days, THEN 2 tablets (20 mg total) daily for 3 days, THEN 1 tablet (10 mg total) daily for 3 days  -     predniSONE 10 mg tablet;  Take 4 tablets (40 mg total) by mouth daily for 3 days, THEN 3 tablets (30 mg total) daily for 3 days, THEN 2 tablets (20 mg total) daily for 3 days, THEN 1 tablet (10 mg total) daily for 3 days  Moderate asthma with exacerbation, unspecified whether persistent        No follow-ups on file  History of Present Illness   HPI:  Ciara Farr is a 56 y o  male history of systolic congestive heart failure, morbid obesity (status post gastric bypass 2004), obstructive sleep apnea (on CPAP), moderate persistent asthma, psoriasis, type 2 diabetes  Here today for a sick visit   He was admitted to [facility] on April 8th with respiratory failure felt to be secondary to acute on chronic diastolic congestive heart failure as well as asthma  He received Solu-Medrol and diuretics  He called his cardiologist yesterday after increasing his Bumex to 2 mg b i d  for the past week without relief  It states that he has now been having exertional dyspnea with most activities  He is coughing up clear film  Denies chest pain or heart palpitations  She denies fevers, chills sweats infectious symptoms  Denies other systemic symptoms  Review of Systems   Constitutional: Negative for chills, fatigue and fever  HENT: Negative for congestion, nosebleeds, postnasal drip, rhinorrhea, sinus pressure and sore throat  Eyes: Negative for discharge, redness and itching  Respiratory: Positive for cough, shortness of breath and wheezing  Negative for choking, chest tightness and stridor  Cardiovascular: Negative for chest pain, palpitations and leg swelling  Gastrointestinal: Negative for blood in stool  Genitourinary: Negative for difficulty urinating and dysuria  Musculoskeletal: Negative for arthralgias, joint swelling and myalgias  Skin: Negative for color change and rash  Neurological: Negative for light-headedness and headaches  Hematological: Negative for adenopathy         Historical Information   Past Medical History:   Diagnosis Date    Acute gastritis without hemorrhage     last assessed 3/24/17    Asthma     Diabetes mellitus ([facility] [address] )     Gastric bypass status for obesity     Hyperlipidemia     Hypertension     Impaired fasting glucose     last assessed 5/10/17    Obesity     Viral gastroenteritis     last assessed 11/4/16     Past Surgical History:   Procedure Laterality Date    CARDIAC CATHETERIZATION N/A 3/9/2022    Procedure: CARDIAC RHC W/ PRESSURE SENSOR;  Surgeon: Zay Tinsley MD;  Location:  CARDIAC CATH LAB;   Service: Cardiology    CARPAL TUNNEL RELEASE      bilateral    GASTRIC BYPASS  2004    with han en y   Ethelyn Fulton HERNIA REPAIR      ventral inscisional    TONSILLECTOMY       Family History   Problem Relation Age of Onset    Stroke Mother     Hypertension Father     Cancer Father     COPD Father          Meds/Allergies     Current Outpatient Medications:     Accu-Chek FastClix Lancets MISC, Test blood sugar twice daily, Disp: 200 each, Rfl: 3    albuterol (PROVENTIL HFA,VENTOLIN HFA) 90 mcg/act inhaler, Inhale 2 puffs every 4 (four) hours as needed for wheezing or shortness of breath, Disp: 8 g, Rfl: 3    apixaban (Eliquis) 5 mg, Take 1 tablet (5 mg total) by mouth 2 (two) times a day, Disp: 60 tablet, Rfl: 5    atorvastatin (LIPITOR) 10 mg tablet, Take 1 tablet (10 mg total) by mouth in the morning , Disp: 30 tablet, Rfl: 5    Blood Glucose Monitoring Suppl (Accu-Chek Guide) w/Device KIT, Use 2 (two) times a day Test blood sugar twice daily, Disp: 1 kit, Rfl: 0    budesonide-formoterol (Symbicort) 160-4 5 mcg/act inhaler, Inhale 2 puffs 2 (two) times a day Rinse mouth after use , Disp: 10 2 g, Rfl: 2    bumetanide (BUMEX) 1 mg tablet, Take 1 tablet (1 mg total) by mouth 2 (two) times a day (Patient taking differently: Take 1 mg by mouth 2 (two) times a day 2  In the am and 1 at night), Disp: 180 tablet, Rfl: 3    glucose blood (Accu-Chek Guide) test strip, Test blood sugar twice daily, Disp: 200 strip, Rfl: 3    guaiFENesin (MUCINEX) 600 mg 12 hr tablet, Take 1 tablet (600 mg total) by mouth 2 (two) times a day, Disp: 60 tablet, Rfl: 0    levalbuterol (Xopenex) 1 25 mg/3 mL nebulizer solution, Take 3 mL (1 25 mg total) by nebulization 3 (three) times a day (Patient taking differently: Take 1 25 mg by nebulization as needed), Disp: 270 mL, Rfl: 3    metFORMIN (GLUCOPHAGE) 500 mg tablet, Take 1 tablet (500 mg total) by mouth 2 (two) times a day After meals, Disp: 60 tablet, Rfl: 6    metoprolol succinate (TOPROL-XL) 50 mg 24 hr tablet, Take 1 tablet (50 mg total) by mouth daily, Disp: 90 tablet, Rfl: 1    MULTIPLE VITAMINS-CALCIUM PO, Take 1 tablet by mouth daily, Disp: , Rfl:     potassium chloride (K-DUR,KLOR-CON) 20 mEq tablet, Take 1 tablet (20 mEq total) by mouth 2 (two) times a day, Disp: 180 tablet, Rfl: 3    predniSONE 10 mg tablet, Take 4 tablets (40 mg total) by mouth daily for 3 days, THEN 3 tablets (30 mg total) daily for 3 days, THEN 2 tablets (20 mg total) daily for 3 days, THEN 1 tablet (10 mg total) daily for 3 days  , Disp: 30 tablet, Rfl: 0    sitaGLIPtin (Januvia) 100 mg tablet, Take 1 tablet (100 mg total) by mouth in the morning , Disp: 30 tablet, Rfl: 5    sodium chloride (OCEAN) 0 65 % nasal spray, 1 spray into each nostril 3 (three) times a day, Disp: 30 mL, Rfl: 0    tiotropium (Spiriva Respimat) 1 25 MCG/ACT AERS inhaler, Inhale 2 puffs daily, Disp: 4 g, Rfl: 3    montelukast (SINGULAIR) 10 mg tablet, Take 1 tablet (10 mg total) by mouth daily at bedtime, Disp: 90 tablet, Rfl: 1  Allergies   Allergen Reactions    Azithromycin Other (See Comments)     Shaky, uneasy feeling     Bactrim [Sulfamethoxazole-Trimethoprim] Other (See Comments)     shakiness       Vitals: Blood pressure 124/68, pulse 86, temperature 98 1 °F (36 7 °C), resp  rate 18, height 6' (1 829 m), weight (!) 141 kg (310 lb), SpO2 93 %  Body mass index is 42 04 kg/m²  Oxygen Therapy  SpO2: 93 %  Oxygen Therapy: None (Room air)      Physical Exam  Physical Exam  Vitals reviewed     Constitutional: General: He is not in acute distress  Appearance: He is well-developed  He is not ill-appearing, toxic-appearing or diaphoretic  HENT:      Head: Normocephalic and atraumatic  Right Ear: External ear normal       Left Ear: External ear normal       Nose: Nose normal  No congestion or rhinorrhea  Mouth/Throat:      Pharynx: No oropharyngeal exudate or posterior oropharyngeal erythema  Eyes:      General: No scleral icterus  Right eye: No discharge  Left eye: No discharge  Conjunctiva/sclera: Conjunctivae normal       Pupils: Pupils are equal, round, and reactive to light  Neck:      Trachea: No tracheal deviation  Cardiovascular:      Rate and Rhythm: Normal rate and regular rhythm  Heart sounds: Normal heart sounds  No murmur heard  No friction rub  No gallop  Pulmonary:      Effort: Pulmonary effort is normal       Breath sounds: No stridor  Wheezing present  No rales  Musculoskeletal:      Cervical back: Normal range of motion  Right lower leg: No edema  Left lower leg: No edema  Lymphadenopathy:      Head:      Right side of head: No submental, submandibular, preauricular or posterior auricular adenopathy  Left side of head: No submental, submandibular, preauricular or posterior auricular adenopathy  Cervical: No cervical adenopathy  Skin:     General: Skin is warm and dry  Findings: No lesion or rash  Neurological:      Mental Status: He is alert and oriented to person, place, and time  Labs: I have personally reviewed pertinent lab results    Lab Results   Component Value Date    WBC 11 27 (H) 04/08/2022    HGB 12 4 04/08/2022    HCT 37 0 04/08/2022    MCV 90 04/08/2022     04/08/2022     Lab Results   Component Value Date    CALCIUM 9 1 04/08/2022    K 4 4 04/08/2022    CO2 28 04/08/2022     04/08/2022    BUN 17 04/08/2022    CREATININE 1 65 (H) 04/08/2022     Lab Results   Component Value Date    IGE 204 (H) 04/07/2022     Lab Results   Component Value Date    ALT 13 03/01/2022    AST 12 03/01/2022    ALKPHOS 106 03/01/2022       Imaging and other studies: I have personally reviewed pertinent reports  and I have personally reviewed pertinent films in PACS     11/9/2021   FINDINGS:     Cardiomediastinal silhouette appears unremarkable      The lungs are clear  No pneumothorax or pleural effusion      Osseous structures appear within normal limits for patient age      IMPRESSION:     No acute cardiopulmonary disease       Chest x-ray September 22, 2020  IMPRESSION:     No acute cardiopulmonary disease        Pulmonary function testing:  October 22, 2021    Results:  FVC: 3 31 L       61 % predicted  FEV1: 1 63 L     39 % predicted  FEV1/FVC Ratio: 49 %     After administration of bronchodilator   FVC: 3 89 L, 72 % predicted, 17 % change  FEV1: 2 45 L, 58 % predicted, 50 % change     Lung volumes by body plethysmography:   Total Lung Capacity 98 % predicted   Residual volume 183 % predicted     DLCO corrected for patients hemoglobin level: 65 %     Interpretation:     · Severe obstructive airflow defect on spirometry     · Significant improvement in airflow or forced vital capacity in response to the administration of bronchodilator per ATS standards      · Air trapping as indicated by the lung volumes     · Mild decrease in diffusion capacity     · Flow-volume loop consistent with obstruction    EKG, Pathology, and Other Studies: I have personally reviewed pertinent reports  and I have personally reviewed pertinent films in PACS    Echocardiogram July 2021   Left ventricular ejection fraction 47%, grade 1 diastolic dysfunction, mild left atrial dilation, normal RV size and function, no valvular stenosis  MARLIN Otero    [facility]  Answers for HPI/ROS submitted by the patient on 10/4/2021  Do you experience frequent throat clearing?: Yes  Chronicity: recurrent  When did you first notice your symptoms?: more than 1 month ago  How often do your symptoms occur?: daily  Since you first noticed this problem, how has it changed?: gradually improving  Do you have shortness of breath that occurs with effort or exertion?: Yes  Do you have ear congestion?: No  Do you have heartburn?: No  Do you have fatigue?: Yes  Do you have nasal congestion?: No  Do you have shortness of breath when lying flat?: Yes  Do you have shortness of breath when you wake up?: Yes  Do you have sweats?: No  Have you experienced weight loss?: No  Which of the following makes your symptoms worse?: any activity, exercise, lying down, minimal activity  Which of the following makes your symptoms better?: change in position, diuretics, rest, steroid inhaler

## 2022-06-13 PROBLEM — I50.9 CHF (CONGESTIVE HEART FAILURE) (HCC): Status: ACTIVE | Noted: 2021-11-12

## 2022-06-16 ENCOUNTER — OFFICE VISIT (OUTPATIENT)
Dept: FAMILY MEDICINE CLINIC | Facility: CLINIC | Age: 57
End: 2022-06-16
Payer: COMMERCIAL

## 2022-06-16 VITALS
TEMPERATURE: 97.9 F | HEART RATE: 88 BPM | OXYGEN SATURATION: 97 % | SYSTOLIC BLOOD PRESSURE: 122 MMHG | BODY MASS INDEX: 41.17 KG/M2 | WEIGHT: 304 LBS | RESPIRATION RATE: 16 BRPM | DIASTOLIC BLOOD PRESSURE: 82 MMHG | HEIGHT: 72 IN

## 2022-06-16 DIAGNOSIS — G47.33 OSA (OBSTRUCTIVE SLEEP APNEA): ICD-10-CM

## 2022-06-16 DIAGNOSIS — Z12.5 SCREENING FOR PROSTATE CANCER: ICD-10-CM

## 2022-06-16 DIAGNOSIS — J45.40 MODERATE PERSISTENT ASTHMA, UNSPECIFIED WHETHER COMPLICATED: ICD-10-CM

## 2022-06-16 DIAGNOSIS — E66.01 MORBID OBESITY (HCC): ICD-10-CM

## 2022-06-16 DIAGNOSIS — R25.2 LEG CRAMPS: ICD-10-CM

## 2022-06-16 DIAGNOSIS — N18.31 STAGE 3A CHRONIC KIDNEY DISEASE (HCC): ICD-10-CM

## 2022-06-16 DIAGNOSIS — Z00.00 WELL ADULT EXAM: Primary | ICD-10-CM

## 2022-06-16 DIAGNOSIS — E11.65 TYPE 2 DIABETES MELLITUS WITH HYPERGLYCEMIA, WITHOUT LONG-TERM CURRENT USE OF INSULIN (HCC): ICD-10-CM

## 2022-06-16 DIAGNOSIS — I50.32 CHRONIC DIASTOLIC CONGESTIVE HEART FAILURE (HCC): ICD-10-CM

## 2022-06-16 DIAGNOSIS — E78.2 MIXED HYPERLIPIDEMIA: ICD-10-CM

## 2022-06-16 DIAGNOSIS — I10 ESSENTIAL HYPERTENSION: ICD-10-CM

## 2022-06-16 DIAGNOSIS — I48.0 PAROXYSMAL ATRIAL FIBRILLATION (HCC): ICD-10-CM

## 2022-06-16 LAB — SL AMB POCT HEMOGLOBIN AIC: 5.7 (ref ?–6.5)

## 2022-06-16 PROCEDURE — 3044F HG A1C LEVEL LT 7.0%: CPT | Performed by: INTERNAL MEDICINE

## 2022-06-16 PROCEDURE — 99396 PREV VISIT EST AGE 40-64: CPT | Performed by: FAMILY MEDICINE

## 2022-06-16 PROCEDURE — 83036 HEMOGLOBIN GLYCOSYLATED A1C: CPT | Performed by: FAMILY MEDICINE

## 2022-06-16 NOTE — ASSESSMENT & PLAN NOTE
Lab Results   Component Value Date    EGFR 46 04/08/2022    EGFR 44 04/07/2022    EGFR 66 03/10/2022    CREATININE 1 65 (H) 04/08/2022    CREATININE 1 70 (H) 04/07/2022    CREATININE 1 23 03/10/2022     Recommended to avoid NSAIDs, nephrotoxins  Check labs

## 2022-06-16 NOTE — ASSESSMENT & PLAN NOTE
Lab Results   Component Value Date    HGBA1C 5 7 06/16/2022     Hb A1C significantly improved since March 2022  Patient developed diarrhea last week  Recommended to d/c Metformin  Continue Januvia 100 mg 1 tablet daily in the morning  Continue to monitor blood sugar at home and call with blood sugar log in 2 weeks  Check eye exam by ophthalmology annually  Follow-up with podiatry for routine foot care

## 2022-06-16 NOTE — ASSESSMENT & PLAN NOTE
Patient is asymptomatic  Continue beta-blocker, anticoagulation with Eliquis 5 mg twice daily  Follow up with cardiology Dr Cristina Azevedo

## 2022-06-16 NOTE — ASSESSMENT & PLAN NOTE
C/o leg cramps at night for the past month  Recommended to do stretching exercises, massage leg muscles before going to bed  Will check BMP, Magnesium to r/o electrolyte abnormalities  Continue current dose of Potassium chloride  May try tonic water  Call office if symptoms persist or worsen

## 2022-06-16 NOTE — ASSESSMENT & PLAN NOTE
Wt Readings from Last 3 Encounters:   06/16/22 (!) 138 kg (304 lb)   05/27/22 (!) 141 kg (310 lb)   04/15/22 (!) 140 kg (308 lb 1 6 oz)     Patient appears euvolemic on exam     Continue Bumex as per cardiology  Follow low-sodium diet  Check daily weights

## 2022-06-16 NOTE — ASSESSMENT & PLAN NOTE
Patient has been followed by pulmonology Dr Shannan Alcantara, was seen on May 27, 2022  Completed oral Prednisone course  Reports improvement in chest tightness, cough, wheezing  Continue Symbicort inhaler daily, Spiriva Respimat inhaler 2 puffs daily  Use Ventolin  HFA inhaler PRN  Continue Singulair 10 mg at bedtime

## 2022-06-16 NOTE — ASSESSMENT & PLAN NOTE
Recommended to follow a well-balanced diet, regular exercise, regular dentist visits  Continue working on weight reduction  Schedule screening colonoscopy  Will check PSA level for prostate cancer screening  Patient refusing COVID booster and Pneumococcal vaccination

## 2022-06-16 NOTE — PROGRESS NOTES
Chief Complaint   Patient presents with    Physical Exam    GI Problem     Diarrhea      Health Maintenance   Topic Date Due    Pneumococcal Vaccine: Pediatrics (0 to 5 Years) and At-Risk Patients (6 to 59 Years) (1 - PCV) Never done    HIV Screening  Never done    Colorectal Cancer Screening  Never done    URINE MICROALBUMIN  04/23/2020    COVID-19 Vaccine (3 - Booster for [employer] Cox series) 11/05/2021    Annual Physical  05/14/2022    Influenza Vaccine (Season Ended) 09/01/2022    Diabetic Foot Exam  10/13/2022    DM Eye Exam  11/04/2022    HEMOGLOBIN A1C  12/16/2022    BMI: Followup Plan  01/11/2023    Depression Screening  06/16/2023    BMI: Adult  06/16/2023    DTaP,Tdap,and Td Vaccines (2 - Td or Tdap) 06/09/2028    Hepatitis C Screening  Completed    HIB Vaccine  Aged Out    Hepatitis B Vaccine  Aged Out    IPV Vaccine  Aged Out    Hepatitis A Vaccine  Aged Out    Meningococcal ACWY Vaccine  Aged Out    HPV Vaccine  Aged Out       Depression Screening and Follow-up Plan: Patient was screened for depression during today's encounter  They screened negative with a PHQ-2 score of 0  Assessment/Plan:    Well adult exam  Recommended to follow a well-balanced diet, regular exercise, regular dentist visits  Continue working on weight reduction  Schedule screening colonoscopy  Will check PSA level for prostate cancer screening  Patient refusing COVID booster and Pneumococcal vaccination  Type 2 diabetes mellitus with hyperglycemia (HCC)    Lab Results   Component Value Date    HGBA1C 5 7 06/16/2022     Hb A1C significantly improved since March 2022  Patient developed diarrhea last week  Recommended to d/c Metformin  Continue Januvia 100 mg 1 tablet daily in the morning  Continue to monitor blood sugar at home and call with blood sugar log in 2 weeks  Check eye exam by ophthalmology annually  Follow-up with podiatry for routine foot care      Essential hypertension  BP is adequately controlled  Continue Metoprolol ER 50 mg 1 tablet daily  Paroxysmal atrial fibrillation [facility])  Patient is asymptomatic  Continue beta-blocker, anticoagulation with Eliquis 5 mg twice daily  Follow up with cardiology Dr Sue Gandhi  CHF (congestive heart failure) ([facility])  Wt Readings from Last 3 Encounters:   06/16/22 (!) 138 kg (304 lb)   05/27/22 (!) 141 kg (310 lb)   04/15/22 (!) 140 kg (308 lb 1 6 oz)     Patient appears euvolemic on exam     Continue Bumex as per cardiology  Follow low-sodium diet  Check daily weights  Stage 3a chronic kidney disease [facility])  Lab Results   Component Value Date    EGFR 46 04/08/2022    EGFR 44 04/07/2022    EGFR 66 03/10/2022    CREATININE 1 65 (H) 04/08/2022    CREATININE 1 70 (H) 04/07/2022    CREATININE 1 23 03/10/2022     Recommended to avoid NSAIDs, nephrotoxins  Check labs  Morbid obesity ([address] )  Patient lost 18 lb since January 2022  Encouraged to continue to working on weight reduction  Mixed hyperlipidemia  Continue Atorvastatin 10 mg daily  Check CMP, lipid panel  Moderate persistent asthma  Patient has been followed by pulmonology Dr Yazmin Mann, was seen on May 27, 2022  Completed oral Prednisone course  Reports improvement in chest tightness, cough, wheezing  Continue Symbicort inhaler daily, Spiriva Respimat inhaler 2 puffs daily  Use Ventolin  HFA inhaler PRN  Continue Singulair 10 mg at bedtime  Leg cramps  C/o leg cramps at night for the past month  Recommended to do stretching exercises, massage leg muscles before going to bed  Will check BMP, Magnesium to r/o electrolyte abnormalities  Continue current dose of Potassium chloride  May try tonic water  Call office if symptoms persist or worsen  VICKY (obstructive sleep apnea)  Use CPAP at night  Schedule follow-up office visit in 6 months       Diagnoses and all orders for this visit:    Well adult exam    Type 2 diabetes mellitus with hyperglycemia, without long-term current use of insulin (HCC)  -     POCT hemoglobin A1c  -     Microalbumin / creatinine urine ratio  -     Comprehensive metabolic panel; Future    Essential hypertension  -     Comprehensive metabolic panel; Future    Paroxysmal atrial fibrillation (HCC)    Chronic diastolic congestive heart failure (HCC)  -     Comprehensive metabolic panel; Future    Stage 3a chronic kidney disease (HCC)  -     Comprehensive metabolic panel; Future    Morbid obesity ([address] )    Mixed hyperlipidemia  -     Lipid panel; Future  -     Comprehensive metabolic panel; Future    Moderate persistent asthma, unspecified whether complicated    Leg cramps  -     Magnesium; Future    VICKY (obstructive sleep apnea)    Screening for prostate cancer  -     PSA, Total Screen; Future          Subjective:      Patient ID: Keiko Abbott is a 56 y o  male  HPI     Patient presents for annual physical exam       He is back to work full-time  PMHx: HTN, chronic diastolic CHF, PAF,Type 2 DM, CKD stage 3, Moderate persistent asthma, VICKY, Morbid obesity, Hyperlipidemia  Reviewed current medications blood test results from April 2022  Patient has been followed by cardiology Dr Louis Patel, was seen by cardiology PA-C in April 2022  Patient has been followed by pulmonology Dr Betty Billings, was seen on May 27, 2022  Due to asthma exacerbation patient was started on Prednisone taper  Reports improvement in cough, wheezing, chest tightness  He has mild exertional shortness of breath  [facility] hospitalization to [facility] in April 2022 for acute CHF  Patient takes all his medication on a regular basis  HTN -  blood pressure remains stable  Denies chest pain, dizziness  PAF -reports no heart palpitations  Currently on anticoagulation with Eliquis 5 mg twice daily, takes beta-blocker        Type 2 DM -patient takes Metformin 500 mg 1 tablet twice daily with meals, Januvia 100 mg daily in am   Checks blood sugar 2-3 times per week  Blood sugar averaging around 160  Reports no hypoglycemic episodes  Developed diarrhea 1 week ago  Symptoms subsided for the past 2 days  No abdominal pain, nausea, vomiting  Patient states that he was seen by podiatry Dr Genell Epley   2 months ago  Eye exam done at [facility], will obtain most recent report  Morbid obesity - lost 18 lb since 1/2022  Tries to follow a healthy diet, walks  C/o leg cramps at night  He takes Potassium chloride 20 meq twice daily due to being on diuretic therapy with Bumex for CHF  Denies tobacco use  Patient has order for colonoscopy, plans to schedule colonoscopy with colorectal surgeon  Refusing COVID booster and Pneumovax vaccination  The following portions of the patient's history were reviewed and updated as appropriate: allergies, current medications, past medical history, past social history, past surgical history and problem list     Review of Systems   Constitutional: Negative for activity change, appetite change, chills, fatigue and fever  HENT: Negative for congestion, ear pain, nosebleeds, sinus pressure, sore throat and trouble swallowing  Eyes: Negative for pain, discharge, redness, itching and visual disturbance  Respiratory: Positive for shortness of breath (mild exertional SOB)  Negative for cough, chest tightness, wheezing and stridor  Cardiovascular: Positive for leg swelling (improved)  Negative for chest pain and palpitations  Gastrointestinal: Positive for diarrhea  Negative for abdominal pain, blood in stool, constipation, nausea and vomiting  Genitourinary: Negative for difficulty urinating, dysuria, flank pain and hematuria  Musculoskeletal: Positive for arthralgias  Negative for back pain (chronic), gait problem and joint swelling  Skin: Negative for rash  Neurological: Negative for dizziness, syncope and headaches     Hematological: Bruises/bleeds easily  Psychiatric/Behavioral: Negative for dysphoric mood and sleep disturbance  The patient is not nervous/anxious  Objective:      /82 (BP Location: Left arm, Patient Position: Sitting)   Pulse 88   Temp 97 9 °F (36 6 °C)   Resp 16   Ht 6' (1 829 m)   Wt (!) 138 kg (304 lb)   SpO2 97%   BMI 41 23 kg/m²          Physical Exam  Vitals and nursing note reviewed  Constitutional:       Appearance: Normal appearance  Comments: Morbidly obese   HENT:      Head: Normocephalic and atraumatic  Eyes:      Conjunctiva/sclera: Conjunctivae normal       Pupils: Pupils are equal, round, and reactive to light  Neck:      Vascular: No carotid bruit  Cardiovascular:      Rate and Rhythm: Normal rate and regular rhythm  Heart sounds: No murmur heard  Pulmonary:      Effort: Pulmonary effort is normal       Breath sounds: Normal breath sounds  No wheezing or rales  Abdominal:      General: There is no distension  Palpations: Abdomen is soft  Tenderness: There is no abdominal tenderness  Musculoskeletal:         General: No swelling  Normal range of motion  Cervical back: Normal range of motion and neck supple  Right lower leg: No edema  Left lower leg: No edema  Skin:     General: Skin is warm and dry  Findings: No rash  Neurological:      General: No focal deficit present  Mental Status: He is alert  Motor: No weakness        Gait: Gait normal    Psychiatric:         Mood and Affect: Mood normal

## 2022-06-24 ENCOUNTER — OFFICE VISIT (OUTPATIENT)
Dept: CARDIOLOGY CLINIC | Facility: CLINIC | Age: 57
End: 2022-06-24
Payer: COMMERCIAL

## 2022-06-24 VITALS
HEIGHT: 72 IN | BODY MASS INDEX: 42.27 KG/M2 | DIASTOLIC BLOOD PRESSURE: 68 MMHG | OXYGEN SATURATION: 96 % | WEIGHT: 312.1 LBS | HEART RATE: 79 BPM | SYSTOLIC BLOOD PRESSURE: 104 MMHG

## 2022-06-24 DIAGNOSIS — I48.0 PAROXYSMAL ATRIAL FIBRILLATION (HCC): ICD-10-CM

## 2022-06-24 DIAGNOSIS — E78.5 HYPERLIPIDEMIA, UNSPECIFIED HYPERLIPIDEMIA TYPE: ICD-10-CM

## 2022-06-24 DIAGNOSIS — I10 HYPERTENSION, UNSPECIFIED TYPE: ICD-10-CM

## 2022-06-24 DIAGNOSIS — I50.32 CHRONIC HEART FAILURE WITH PRESERVED EJECTION FRACTION (HCC): Primary | ICD-10-CM

## 2022-06-24 PROCEDURE — 3008F BODY MASS INDEX DOCD: CPT | Performed by: INTERNAL MEDICINE

## 2022-06-24 PROCEDURE — 99214 OFFICE O/P EST MOD 30 MIN: CPT | Performed by: INTERNAL MEDICINE

## 2022-06-24 RX ORDER — BUMETANIDE 1 MG/1
TABLET ORAL
Qty: 180 TABLET | Refills: 3
Start: 2022-06-24 | End: 2022-07-28 | Stop reason: SDUPTHER

## 2022-06-24 NOTE — PROGRESS NOTES
Advanced Heart Failure Outpatient Progress Note - Kristina Stubbs 56 y o  male MRN: [id number]    Encounter: [id number]      Assessment/Plan:    Patient Active Problem List    Diagnosis Date Noted    Leg cramps 06/16/2022    Paroxysmal atrial fibrillation ([facility] ) 03/01/2022    Stage 3a chronic kidney disease ([facility] ) 12/23/2021    Hypokalemia 11/14/2021    CHF (congestive heart failure) ([facility] ) 11/12/2021    Moderate persistent asthma 10/11/2021    Dyspnea on exertion 10/11/2021    Hyponatremia 07/31/2021    Cardiomyopathy ([facility] ) 07/19/2021    Well adult exam 05/14/2021    Decreased left ventricular systolic function 58/90/6180    Bilateral leg edema 02/19/2020    Edema of both feet 01/23/2020    VICKY (obstructive sleep apnea)     Daytime sleepiness 07/17/2019    Mixed hyperlipidemia 04/18/2019    Morbid obesity ([facility] ) 04/18/2019    Vitamin B12 deficiency 04/18/2019    Vitamin D deficiency 04/18/2019    Knee sprain, bilateral 06/14/2018    Primary osteoarthritis of both knees 06/14/2018    Irritable bowel syndrome with diarrhea 01/30/2018    Essential hypertension 01/30/2018    Type 2 diabetes mellitus with hyperglycemia ([facility] ) 01/30/2018     # Chronic HFpEF, Stage C, NYHA III    Volume up on exam today, CardioMems PAD 18mmHg     Weight:  312 lbs 6/24/22  NT proBNP: 609 4/8/22      Studies- personally reviewed by [facility] 3/9/22:   RA 4   RV 35/4   PA 35/12/21   PCWP 10   PA sat 64%   Travis CO 5 56 L/min   Travis CI 2 2L/min/m2   PVR 1 97 SAGASTUME     TTE 03/25/2020: LVEF 40-45%  LVIDd 6 12 cm  Normal RV     TTE 07/19/2021: LVEF 50%  LVIDd 6 13 cm  Grade 1 DD  Normal RV  Trace MR and TR  Mildly dilated IVC  TTE 11/12/2021: LVEF 55%  LVIDd 6 0 cm  Grade 1 DD  Normal RV  Trace TR        Neurohormonal Blockade:   --Beta Blocker: metoprolol succinate 50 mg daily     --ARNi / ACEi / ARB: No     --Aldosterone Antagonist: No     --SGLT2 Inhibitor: No    --Diuretic: bumex 2mg in AM and 1mg in PM, potassium 20 meq BID    Sudden Cardiac Death Risk Reduction:   --LVEF >35%         Electrical Resynchronization:   --Candidacy for BiV device: narrow QRS       Advanced Therapies:   Will continue to monitor  LVEF recovered      # Atrial fibrillation   [id number] = 3 (HF, HTN, DM)  Diagnosed 02/2022  Anticoagulation on Eliquis  Rate control: BB as above  Rhythm control: No       # Hypertension, controlled       # Hyperlipidemia   Most recent lipid panel from 10/01/2021: cholesterol 157; TG 90;   HDL 66; calculated LDL 73  Rx: atorvastatin 10 mg daily         # Diabetes mellitus, type II     Non-insulin dependent  HbA1c 6/16/22: 5 7%      # Obstructive sleep apnea  # Chronic respiratory failure   # Asthma, moderate persistent    # S/p gastric bypass (Samuel-en-Y)surgery    # History of tobacco abuse   # Carpal tunnel syndrome, bilateral    # CKD, Cr 1 43   # s/p CardioMems implant 3/9/22     TODAY'S PLAN:  Take extra bumex 1mg today with extra 20 meq of potassium, then go back to bumex 2mg in AM and 1mg in PM and potassium 20 meq two times a day  2g sodium diet  2L fluid restriction  Daily weights  CardioMems transmissions    HPI:   Keiko Abbott is a 45-year-old man with a PMH as above who presents to the office for follow-up      Admitted to [facility] from 12/23 to 01/01/2022 after presenting with worsening SOB  Started on IV Lasix (later switched to IV Bumex) and IV steroids  Did receive 2 doses of metolazone while inpatient  Transitioned to PO torsemide on day of discharge  Lost 12 lbs and net negative 15 L this admission       Presented to [facility]- ED on 02/12/2022 with worsening SOB  Diagnosed with Afib and was started on Eliquis and BB  Also received 80 mg IV Lasix, and was discharged home       02/22/2022: Patient presents for hospital follow-up  Has been feeling "good" until yesterday  Developed TELLES and noted orthopnea overnight   Also with recently worsening of cough resulting in increased use of PRN inhalers/nebulizers  Denies recent dietary indiscretion  Drinking no more than 2000 mL daily  Is completing daily weights; reports down-trending weights over past few days  Has noted slight decrease in response to PO torsemide       Admitted to [facility] from 03/01 to 03/10/2022 after presenting with worsening SOB and LE swelling  Started on IV Lasix and IV steroids  Later was switched to IV Bumex on 03/04  Transitioned to PO Bumex on 03/07  CardioMEMS implanted on 03/09  Lost 7 lbs and net negative 8 1 L this admission       03/14/2022: Patient presents for hospital follow-up  No current complaints  Weights stable at home in low 310s lbs range  No issues with CardioMEMS system at home  Still waiting for new CPAP machine       Presented to [facility] ED on 04/08 at the direction of his pulmonologist due to worsening SOB and TELLES, productive cough, and wheezing  In ED received albuterol nebulizer treatment  CXR without acute cardiopulmonary disease  Heart failure team recommended increasing Bumex to 2 mg qAM and 1 mg qPM with close outpatient follow-up       Contacted by HF RN on 04/08 to review diuretic dosing changes  Patient reported no improvement since ED visit and presented to [address] [place] [address] ED for second opinion/further evaluation  Admitted to [address] [place] [address] from 04/08 to 04/11/2022  Started on IV steroids and IV Lasix  Diagnosed with acute asthma and HF exacerbations  Lost 2 lbs this admission  Discharged on PO steroid taper      04/15/2022: Patient presents for hospital follow-up appointment  Reviewed recent hospital course(s)  No current complaints  Has continued taking Bumex 2 mg qAm and 1 mg qPM  Is completing daily weights and denies any significant weight gain since returning home  Still waiting on having new CPAP machine delivered  Planning to return to work next week    Interval History:  6/24/22: Here for follow up  Overall doing well  Recent treatments for asthma exacerbation   Last steroid course 3 weeks ago  He is overall doing well  No shortness of breath, PND or orthopnea  Mild lower extremity edema  Home scale not working  Recent CardioMems PADs trending up  Review of Systems   Constitutional: Negative for chills and fever  HENT: Negative for ear pain and sore throat  Eyes: Negative for pain and visual disturbance  Respiratory: Negative for cough and shortness of breath  Cardiovascular: Negative for chest pain and palpitations  Gastrointestinal: Negative for abdominal distention, abdominal pain and vomiting  Genitourinary: Negative for dysuria and hematuria  Musculoskeletal: Negative for arthralgias and back pain  Skin: Negative for color change and rash  Neurological: Negative for dizziness, seizures, syncope and light-headedness  All other systems reviewed and are negative  Past Medical History:   Diagnosis Date    Acute gastritis without hemorrhage     last assessed 3/24/17    Asthma     Diabetes mellitus ([facility] )     Gastric bypass status for obesity     Hyperlipidemia     Hypertension     Impaired fasting glucose     last assessed 5/10/17    Obesity     Viral gastroenteritis     last assessed 11/4/16         Allergies   Allergen Reactions    Azithromycin Other (See Comments)     Shaky, uneasy feeling     Bactrim [Sulfamethoxazole-Trimethoprim] Other (See Comments)     shakiness           Current Outpatient Medications:     Accu-Chek FastClix Lancets MISC, Test blood sugar twice daily, Disp: 200 each, Rfl: 3    albuterol (PROVENTIL HFA,VENTOLIN HFA) 90 mcg/act inhaler, Inhale 2 puffs every 4 (four) hours as needed for wheezing or shortness of breath, Disp: 8 g, Rfl: 3    apixaban (Eliquis) 5 mg, Take 1 tablet (5 mg total) by mouth 2 (two) times a day, Disp: 60 tablet, Rfl: 5    atorvastatin (LIPITOR) 10 mg tablet, Take 1 tablet (10 mg total) by mouth in the morning , Disp: 30 tablet, Rfl: 5    Blood Glucose Monitoring Suppl (Accu-Chek Guide) w/Device KIT, Use 2 (two) times a day Test blood sugar twice daily, Disp: 1 kit, Rfl: 0    budesonide-formoterol (Symbicort) 160-4 5 mcg/act inhaler, Inhale 2 puffs 2 (two) times a day Rinse mouth after use , Disp: 10 2 g, Rfl: 2    bumetanide (BUMEX) 1 mg tablet, Take 1 tablet (1 mg total) by mouth 2 (two) times a day (Patient taking differently: Take 1 mg by mouth 2 (two) times a day 2  In the am and 1 at night), Disp: 180 tablet, Rfl: 3    glucose blood (Accu-Chek Guide) test strip, Test blood sugar twice daily, Disp: 200 strip, Rfl: 3    guaiFENesin (MUCINEX) 600 mg 12 hr tablet, Take 1 tablet (600 mg total) by mouth 2 (two) times a day, Disp: 60 tablet, Rfl: 0    levalbuterol (Xopenex) 1 25 mg/3 mL nebulizer solution, Take 3 mL (1 25 mg total) by nebulization 3 (three) times a day (Patient taking differently: Take 1 25 mg by nebulization as needed), Disp: 270 mL, Rfl: 3    metoprolol succinate (TOPROL-XL) 50 mg 24 hr tablet, Take 1 tablet (50 mg total) by mouth daily, Disp: 90 tablet, Rfl: 1    montelukast (SINGULAIR) 10 mg tablet, Take 1 tablet (10 mg total) by mouth daily at bedtime, Disp: 90 tablet, Rfl: 1    MULTIPLE VITAMINS-CALCIUM PO, Take 1 tablet by mouth daily, Disp: , Rfl:     potassium chloride (K-DUR,KLOR-CON) 20 mEq tablet, Take 1 tablet (20 mEq total) by mouth 2 (two) times a day, Disp: 180 tablet, Rfl: 3    sitaGLIPtin (Januvia) 100 mg tablet, Take 1 tablet (100 mg total) by mouth in the morning , Disp: 30 tablet, Rfl: 5    sodium chloride (OCEAN) 0 65 % nasal spray, 1 spray into each nostril 3 (three) times a day (Patient taking differently: 1 spray into each nostril if needed), Disp: 30 mL, Rfl: 0    tiotropium (Spiriva Respimat) 1 25 MCG/ACT AERS inhaler, Inhale 2 puffs daily, Disp: 4 g, Rfl: 3    Social History     Socioeconomic History    Marital status: /Civil Union     Spouse name: Not on file    Number of children: Not on file    Years of education: Not on file    Highest education level: Not on file   Occupational History    Not on file   Tobacco Use    Smoking status: Former Smoker     Packs/day: 0 01     Years: 11 00     Pack years: 0 11     Types: Pipe, Cigars     Start date:      Quit date:      Years since quittin 4    Smokeless tobacco: Never Used    Tobacco comment: cigar once a day   Vaping Use    Vaping Use: Never used   Substance and Sexual Activity    Alcohol use: Yes     Alcohol/week: 2 0 standard drinks     Types: 2 Shots of liquor per week     Comment: social    Drug use: No    Sexual activity: Yes     Partners: Female     Birth control/protection: None   Other Topics Concern    Not on file   Social History Narrative    Not on file     Social Determinants of Health     Financial Resource Strain: Not on file   Food Insecurity: No Food Insecurity    Worried About Running Out of Food in the Last Year: Never true    Aria of Food in the Last Year: Never true   Transportation Needs: No Transportation Needs    Lack of Transportation (Medical): No    Lack of Transportation (Non-Medical): No   Physical Activity: Not on file   Stress: Not on file   Social Connections: Not on file   Intimate Partner Violence: Not on file   Housing Stability: Unknown    Unable to Pay for Housing in the Last Year: No    Number of Places Lived in the Last Year: Not on file    Unstable Housing in the Last Year: No       Family History   Problem Relation Age of Onset    Stroke Mother     Hypertension Father     Cancer Father     COPD Father        Physical Exam:    Vitals:   Vitals:    22 0805   BP: 104/68   Pulse: 79   SpO2: 96%       Physical Exam  Constitutional:       General: He is not in acute distress  Appearance: Normal appearance  HENT:      Head: Normocephalic and atraumatic  Mouth/Throat:      Mouth: Mucous membranes are moist    Eyes:      General: No scleral icterus  Extraocular Movements: Extraocular movements intact  Cardiovascular:      Rate and Rhythm: Normal rate and regular rhythm  Pulses: Normal pulses  Heart sounds: S1 normal and S2 normal  No murmur heard  No friction rub  No gallop  Comments: Elevated JVP  Mostly nonpitting edema bilaterally  Pulmonary:      Breath sounds: Normal breath sounds  Abdominal:      General: There is no distension  Palpations: Abdomen is soft  Tenderness: There is no abdominal tenderness  There is no guarding or rebound  Musculoskeletal:         General: Normal range of motion  Cervical back: Neck supple  Skin:     General: Skin is warm and dry  Capillary Refill: Capillary refill takes less than 2 seconds  Neurological:      General: No focal deficit present  Mental Status: He is alert and oriented to person, place, and time  Psychiatric:         Mood and Affect: Mood normal          Labs & Results:    Lab Results   Component Value Date    SODIUM 139 04/08/2022    K 4 4 04/08/2022     04/08/2022    CO2 28 04/08/2022    BUN 17 04/08/2022    CREATININE 1 65 (H) 04/08/2022    GLUC 111 04/08/2022    CALCIUM 9 1 04/08/2022     Lab Results   Component Value Date    WBC 11 27 (H) 04/08/2022    HGB 12 4 04/08/2022    HCT 37 0 04/08/2022    MCV 90 04/08/2022     04/08/2022     Lab Results   Component Value Date    NTBNP 609 (H) 04/08/2022      Lab Results   Component Value Date    CHOLESTEROL 157 10/01/2021    CHOLESTEROL 215 (H) 01/28/2020    CHOLESTEROL 196 04/23/2019     Lab Results   Component Value Date    HDL 66 10/01/2021    HDL 46 01/28/2020    HDL 40 04/23/2019     Lab Results   Component Value Date    TRIG 90 10/01/2021    TRIG 126 01/28/2020    TRIG 126 04/23/2019     Lab Results   Component Value Date    [place] 91 10/01/2021    [place] 169 01/28/2020    [place] 156 04/23/2019       EKG personally reviewed by Julio Currie MD      Counseling / Coordination of Care  Time spent today 25 minutes    Greater than 50% of total time was spent with the patient and / or family counseling and / or coordination of care  We went over current diagnosis, most recent studies and any changes in treatment  Thank you for the opportunity to participate in the care of this patient      Ayad Loza MD  ADVANCED HEART FAILURE AND MECHANICAL CIRCULATORY SUPPORT  [facility]

## 2022-06-24 NOTE — PATIENT INSTRUCTIONS
Take extra bumex 1mg today with extra 20 meq of potassium, then go back to bumex 2mg in AM and 1mg in PM and potassium 20 meq two times a day  2g sodium diet  2L fluid restriction  Daily weights

## 2022-07-11 ENCOUNTER — TELEPHONE (OUTPATIENT)
Dept: CARDIOLOGY CLINIC | Facility: CLINIC | Age: 57
End: 2022-07-11

## 2022-07-11 NOTE — TELEPHONE ENCOUNTER
Dr Robert Domingo is off today  Keon l/m on HF line requesting return call  I returned his call  C/o SOB w/ exertion and B/L pedal and ankle edema since Saturday and worsening  Does not feel this this an asthma exacerbation denies wheeze  Last documented weight @ home 7/8 304 lbs-- states scale broke although in Dr Sagrario Marquez note 6/24 he is not weighing himself due to broken scale  I don't think he's weighing himself  Ordered to take Bumex 2 mg am and 1 mg pm w/ Potassium 20 meq BID  Took Bumex 2 mg BID yesterday w/ Potassium 20 meq BID w/ no change in urine output or change in symptoms  Took Bumex 2 mg and 20 meq Potassium so far this morning  Most recent labs; K 4 8, Creat 1 59    CardioMEMS goal 15; today 16  Past week he trended up to 18 and going down slowly  Did not need to intervene during week

## 2022-07-12 NOTE — TELEPHONE ENCOUNTER
Still SOB w/ steps and ambulating 20 ft  Slight wheeze  Advised if he has no improvement w/ increase in diuretic likely asthma exacerbation given PAD at goal    Provided instructions-- verbally understood  Still did not get a scale, will get one tomorrow  Will get labs today

## 2022-07-12 NOTE — TELEPHONE ENCOUNTER
Can give another extra 1mg of bumex and 20 meq of potassium today if still symptomatic as above   Obtain BMP

## 2022-07-14 DIAGNOSIS — J45.41 MODERATE PERSISTENT ASTHMA WITH ACUTE EXACERBATION: ICD-10-CM

## 2022-07-14 RX ORDER — BUDESONIDE AND FORMOTEROL FUMARATE DIHYDRATE 160; 4.5 UG/1; UG/1
AEROSOL RESPIRATORY (INHALATION)
Qty: 30.6 G | Refills: 2 | Status: SHIPPED | OUTPATIENT
Start: 2022-07-14

## 2022-07-25 ENCOUNTER — TELEPHONE (OUTPATIENT)
Dept: CARDIOLOGY CLINIC | Facility: CLINIC | Age: 57
End: 2022-07-25

## 2022-07-25 DIAGNOSIS — I50.32 CHRONIC HEART FAILURE WITH PRESERVED EJECTION FRACTION (HCC): Primary | ICD-10-CM

## 2022-07-25 NOTE — TELEPHONE ENCOUNTER
Per Dr Francisco Mcnulty, take additional 1 mg Bumex w/ 20 meq Potassium today only  S/w Keon advised to make increase for today  Also per Dr Ham Calderón most recent note, patient was due to increase Potassium to 20 meQ BID-- advised Keon to make that change-- verbally understood

## 2022-07-25 NOTE — TELEPHONE ENCOUNTER
PAD elevated;   Today 7/25 18  7/24 no transmission  7/23 23  7/22 22  7/21 25  7/20 16  Goal 15    C/o coughing x1 week  SOB w/ exertion, worsening today x1 week  Pedal up to legs edema x3 days     No change in diet/fluids   Has not missed meds  Still has not purchased a scale       Currently taking Bumex 2 mg am, 1 mg PM and Potassium 20 meq daily    Due for o/v in Sept

## 2022-07-26 NOTE — TELEPHONE ENCOUNTER
Jose Mendez called this morning to clarify Potassium dose-- advised per Dr Rigoberto Solorio most recent note-- Potassium 20 meq BID-- verbally understood  Symptoms unchanged from yesterday   Voided a little more than usual      PAD today same as yesterday-- 18  Goal 15    Weight today 310 lbs   O/v weight 6/24 312 6 lbs     Took extra 1 mg Bumex yesterday     BMP 4/11 K 4 8, creat 1 59

## 2022-07-26 NOTE — TELEPHONE ENCOUNTER
S/w Keon advised-- verbally understood  Advised I will call him once PAD at goal and meds should go back prior dosing  BMP ordered

## 2022-07-27 ENCOUNTER — TELEPHONE (OUTPATIENT)
Dept: CARDIOLOGY CLINIC | Facility: CLINIC | Age: 57
End: 2022-07-27

## 2022-07-27 ENCOUNTER — APPOINTMENT (OUTPATIENT)
Dept: LAB | Facility: HOSPITAL | Age: 57
End: 2022-07-27
Attending: INTERNAL MEDICINE
Payer: COMMERCIAL

## 2022-07-27 DIAGNOSIS — I50.32 CHRONIC HEART FAILURE WITH PRESERVED EJECTION FRACTION (HCC): Primary | ICD-10-CM

## 2022-07-27 DIAGNOSIS — I50.32 CHRONIC HEART FAILURE WITH PRESERVED EJECTION FRACTION (HCC): ICD-10-CM

## 2022-07-27 LAB
ANION GAP SERPL CALCULATED.3IONS-SCNC: 3 MMOL/L (ref 4–13)
BUN SERPL-MCNC: 23 MG/DL (ref 5–25)
CALCIUM SERPL-MCNC: 8.9 MG/DL (ref 8.3–10.1)
CHLORIDE SERPL-SCNC: 105 MMOL/L (ref 96–108)
CO2 SERPL-SCNC: 30 MMOL/L (ref 21–32)
CREAT SERPL-MCNC: 1.75 MG/DL (ref 0.6–1.3)
GFR SERPL CREATININE-BSD FRML MDRD: 42 ML/MIN/1.73SQ M
GLUCOSE SERPL-MCNC: 105 MG/DL (ref 65–140)
POTASSIUM SERPL-SCNC: 4 MMOL/L (ref 3.5–5.3)
SODIUM SERPL-SCNC: 138 MMOL/L (ref 135–147)

## 2022-07-27 PROCEDURE — 80048 BASIC METABOLIC PNL TOTAL CA: CPT

## 2022-07-27 PROCEDURE — 36415 COLL VENOUS BLD VENIPUNCTURE: CPT

## 2022-07-27 NOTE — TELEPHONE ENCOUNTER
Keon multani/fermin on HF line requesting c/b    Gained 4 lbs overnight  Weight today 314 lbs  Yesterday 310 lbs  Woke up @ 2 am w/ orthopnea unable to go back to sleep- has been up since  PAD today 18  Same x3 days  Goal 15    Ate homemade burger w/ 1 piece of cheese yesterday  1 banana for breakfast  No lunch  Fluids within 60 oz      Plan was to add additional 1 mg Bumex w/ extra 20 meq Potassium until PAD goal 15 then get BMP

## 2022-07-27 NOTE — TELEPHONE ENCOUNTER
Can give another dose of bumex 2mg with 20 meq potassium later today then go back to bumex 2mg BID with potassium 40 meq in AM and 20 meq in PM  Thanks

## 2022-07-27 NOTE — TELEPHONE ENCOUNTER
S/w Dr Cherry Gage, take 2 mg Bumex now w/ extra 20 meq Potassium  BMP STAT  S/w Keon advised-- verbally understood  He will go to lab shortly  Once resulted will determine next plan of care

## 2022-07-28 ENCOUNTER — TELEPHONE (OUTPATIENT)
Dept: CARDIOLOGY CLINIC | Facility: CLINIC | Age: 57
End: 2022-07-28

## 2022-07-28 ENCOUNTER — OFFICE VISIT (OUTPATIENT)
Dept: CARDIOLOGY CLINIC | Facility: CLINIC | Age: 57
End: 2022-07-28
Payer: COMMERCIAL

## 2022-07-28 ENCOUNTER — DOCUMENTATION (OUTPATIENT)
Dept: CARDIOLOGY CLINIC | Facility: CLINIC | Age: 57
End: 2022-07-28

## 2022-07-28 VITALS
SYSTOLIC BLOOD PRESSURE: 114 MMHG | HEIGHT: 72 IN | HEART RATE: 93 BPM | DIASTOLIC BLOOD PRESSURE: 68 MMHG | WEIGHT: 314.8 LBS | OXYGEN SATURATION: 97 % | BODY MASS INDEX: 42.64 KG/M2

## 2022-07-28 DIAGNOSIS — I48.0 PAROXYSMAL ATRIAL FIBRILLATION (HCC): ICD-10-CM

## 2022-07-28 DIAGNOSIS — I10 HYPERTENSION, UNSPECIFIED TYPE: ICD-10-CM

## 2022-07-28 DIAGNOSIS — E87.6 HYPOKALEMIA: ICD-10-CM

## 2022-07-28 DIAGNOSIS — I50.33 ACUTE ON CHRONIC HEART FAILURE WITH PRESERVED EJECTION FRACTION (HCC): Primary | ICD-10-CM

## 2022-07-28 PROCEDURE — 99214 OFFICE O/P EST MOD 30 MIN: CPT | Performed by: INTERNAL MEDICINE

## 2022-07-28 RX ORDER — POTASSIUM CHLORIDE 20 MEQ/1
40 TABLET, EXTENDED RELEASE ORAL 2 TIMES DAILY
Qty: 120 TABLET | Refills: 3
Start: 2022-07-28 | End: 2022-08-12 | Stop reason: SDUPTHER

## 2022-07-28 RX ORDER — BUMETANIDE 1 MG/1
3 TABLET ORAL 2 TIMES DAILY
Qty: 180 TABLET | Refills: 3 | Status: ON HOLD
Start: 2022-07-28 | End: 2022-08-03 | Stop reason: SDUPTHER

## 2022-07-28 RX ORDER — FUROSEMIDE 10 MG/ML
100 INJECTION INTRAMUSCULAR; INTRAVENOUS ONCE
Status: DISCONTINUED | OUTPATIENT
Start: 2022-07-28 | End: 2022-07-28

## 2022-07-28 RX ADMIN — FUROSEMIDE 100 MG: 10 INJECTION INTRAMUSCULAR; INTRAVENOUS at 17:19

## 2022-07-28 NOTE — TELEPHONE ENCOUNTER
Keon called w/ update  Symptoms unchanged- continues w/ SOB w/ min exertion, orthopnea, LE edema and cough   Weight same as yesterday 314 lbs    PAD today 19, up 1-- Goal 15    Took total 6 mg Bumex yesterday  Potassium 70 meq yesterday

## 2022-07-28 NOTE — LETTER
July 28, 2022     Patient: Fernando Way  YOB: 1965  Date of Visit: 7/28/2022      To Whom it May Concern:    Massie Cooks is under my professional care  Nancy Izquierdo was seen in my office on 7/28/2022  Nancy Izquierdo has been having issues with fluid retention since July 25  Return to work to be determined  If you have any questions or concerns, please don't hesitate to call           Sincerely,          Aryan Robertson MD        CC: No Recipients

## 2022-07-28 NOTE — TELEPHONE ENCOUNTER
Per Dr Emilia Bernstein schedule o/v today  L/m for Keon advising of appt today, advised to c/b to confirm

## 2022-07-28 NOTE — PATIENT INSTRUCTIONS
Increase bumex to 3mg two times a day  Increase potassium to 40 meq two times a day  [facility] 8/1/22  2g sodium diet  2L fluid restriction  Daily weights

## 2022-07-28 NOTE — PROGRESS NOTES
100 mg IV Lasix given in LAC without any difficulty  Cath d/c'd intact, dry dsg placed  BP post injection 104/72  Denies any complaints  Patient voided 150 ml of clear yellow urine  Discussed checking daily weights and contact myself at [phone] if they gain 3 lbs in one day or 5 lbs in 5-7 days  Read labels  Limit daily sodium intake to 2000 mg daily  Limit daily fluid intake to 2000 mL (about 60 ounces) daily      Patient and spouse aware, Ofelia Campbell will follow up w/ patient tomorrow

## 2022-07-28 NOTE — TELEPHONE ENCOUNTER
Keon called back, patients manager Dunia Monterroso told patient he was going to call [facility] to see if he was admitted to the hospital for CHF-- hipaa unable to do so  [facility] is asking for a letter stating he has been under our care since Monday 7/25/2022 due to HF exacerbation unable to do duties of work  Works for [employer] out of [employer] [place]  I advised him I would touch base w/ you

## 2022-07-29 ENCOUNTER — HOSPITAL ENCOUNTER (INPATIENT)
Facility: HOSPITAL | Age: 57
LOS: 5 days | Discharge: HOME | DRG: 291 | End: 2022-08-03
Attending: EMERGENCY MEDICINE | Admitting: INTERNAL MEDICINE
Payer: COMMERCIAL

## 2022-07-29 ENCOUNTER — APPOINTMENT (EMERGENCY)
Dept: RADIOLOGY | Facility: HOSPITAL | Age: 57
DRG: 291 | End: 2022-07-29
Payer: COMMERCIAL

## 2022-07-29 ENCOUNTER — TELEPHONE (OUTPATIENT)
Dept: CARDIOLOGY CLINIC | Facility: CLINIC | Age: 57
End: 2022-07-29

## 2022-07-29 DIAGNOSIS — I50.32 CHRONIC DIASTOLIC CONGESTIVE HEART FAILURE (HCC): Primary | ICD-10-CM

## 2022-07-29 DIAGNOSIS — R06.02 SOB (SHORTNESS OF BREATH): ICD-10-CM

## 2022-07-29 DIAGNOSIS — I50.33 ACUTE ON CHRONIC HEART FAILURE WITH PRESERVED EJECTION FRACTION (HCC): ICD-10-CM

## 2022-07-29 DIAGNOSIS — I10 ESSENTIAL HYPERTENSION: ICD-10-CM

## 2022-07-29 DIAGNOSIS — I50.33 ACUTE ON CHRONIC DIASTOLIC CONGESTIVE HEART FAILURE (HCC): ICD-10-CM

## 2022-07-29 PROBLEM — E87.1 HYPONATREMIA: Status: RESOLVED | Noted: 2021-07-31 | Resolved: 2022-07-29

## 2022-07-29 LAB
2HR DELTA HS TROPONIN: -1 NG/L
4HR DELTA HS TROPONIN: -1 NG/L
ALBUMIN SERPL BCP-MCNC: 3.2 G/DL (ref 3.5–5)
ALP SERPL-CCNC: 91 U/L (ref 46–116)
ALT SERPL W P-5'-P-CCNC: 13 U/L (ref 12–78)
ANION GAP SERPL CALCULATED.3IONS-SCNC: 7 MMOL/L (ref 4–13)
AST SERPL W P-5'-P-CCNC: 11 U/L (ref 5–45)
BASOPHILS # BLD AUTO: 0.08 THOUSANDS/ΜL (ref 0–0.1)
BASOPHILS NFR BLD AUTO: 1 % (ref 0–1)
BILIRUB SERPL-MCNC: 0.42 MG/DL (ref 0.2–1)
BUN SERPL-MCNC: 26 MG/DL (ref 5–25)
CALCIUM ALBUM COR SERPL-MCNC: 9.5 MG/DL (ref 8.3–10.1)
CALCIUM SERPL-MCNC: 8.9 MG/DL (ref 8.3–10.1)
CARDIAC TROPONIN I PNL SERPL HS: 12 NG/L
CARDIAC TROPONIN I PNL SERPL HS: 12 NG/L
CARDIAC TROPONIN I PNL SERPL HS: 13 NG/L
CHLORIDE SERPL-SCNC: 101 MMOL/L (ref 96–108)
CO2 SERPL-SCNC: 28 MMOL/L (ref 21–32)
CREAT SERPL-MCNC: 1.56 MG/DL (ref 0.6–1.3)
EOSINOPHIL # BLD AUTO: 1.05 THOUSAND/ΜL (ref 0–0.61)
EOSINOPHIL NFR BLD AUTO: 11 % (ref 0–6)
ERYTHROCYTE [DISTWIDTH] IN BLOOD BY AUTOMATED COUNT: 13.5 % (ref 11.6–15.1)
GFR SERPL CREATININE-BSD FRML MDRD: 49 ML/MIN/1.73SQ M
GLUCOSE SERPL-MCNC: 134 MG/DL (ref 65–140)
GLUCOSE SERPL-MCNC: 139 MG/DL (ref 65–140)
HCT VFR BLD AUTO: 38.2 % (ref 36.5–49.3)
HGB BLD-MCNC: 12.4 G/DL (ref 12–17)
IMM GRANULOCYTES # BLD AUTO: 0.05 THOUSAND/UL (ref 0–0.2)
IMM GRANULOCYTES NFR BLD AUTO: 1 % (ref 0–2)
LYMPHOCYTES # BLD AUTO: 1.6 THOUSANDS/ΜL (ref 0.6–4.47)
LYMPHOCYTES NFR BLD AUTO: 17 % (ref 14–44)
MCH RBC QN AUTO: 30.2 PG (ref 26.8–34.3)
MCHC RBC AUTO-ENTMCNC: 32.5 G/DL (ref 31.4–37.4)
MCV RBC AUTO: 93 FL (ref 82–98)
MONOCYTES # BLD AUTO: 0.88 THOUSAND/ΜL (ref 0.17–1.22)
MONOCYTES NFR BLD AUTO: 9 % (ref 4–12)
NEUTROPHILS # BLD AUTO: 5.94 THOUSANDS/ΜL (ref 1.85–7.62)
NEUTS SEG NFR BLD AUTO: 61 % (ref 43–75)
NRBC BLD AUTO-RTO: 0 /100 WBCS
NT-PROBNP SERPL-MCNC: 903 PG/ML
PLATELET # BLD AUTO: 324 THOUSANDS/UL (ref 149–390)
PMV BLD AUTO: 10.3 FL (ref 8.9–12.7)
POTASSIUM SERPL-SCNC: 3.9 MMOL/L (ref 3.5–5.3)
PROT SERPL-MCNC: 7.2 G/DL (ref 6.4–8.4)
RBC # BLD AUTO: 4.1 MILLION/UL (ref 3.88–5.62)
SODIUM SERPL-SCNC: 136 MMOL/L (ref 135–147)
WBC # BLD AUTO: 9.6 THOUSAND/UL (ref 4.31–10.16)

## 2022-07-29 PROCEDURE — 36415 COLL VENOUS BLD VENIPUNCTURE: CPT

## 2022-07-29 PROCEDURE — 93005 ELECTROCARDIOGRAM TRACING: CPT

## 2022-07-29 PROCEDURE — 82948 REAGENT STRIP/BLOOD GLUCOSE: CPT

## 2022-07-29 PROCEDURE — 84484 ASSAY OF TROPONIN QUANT: CPT | Performed by: INTERNAL MEDICINE

## 2022-07-29 PROCEDURE — 71045 X-RAY EXAM CHEST 1 VIEW: CPT

## 2022-07-29 PROCEDURE — 80053 COMPREHEN METABOLIC PANEL: CPT

## 2022-07-29 PROCEDURE — 83880 ASSAY OF NATRIURETIC PEPTIDE: CPT

## 2022-07-29 PROCEDURE — 99223 1ST HOSP IP/OBS HIGH 75: CPT | Performed by: INTERNAL MEDICINE

## 2022-07-29 PROCEDURE — 99285 EMERGENCY DEPT VISIT HI MDM: CPT | Performed by: EMERGENCY MEDICINE

## 2022-07-29 PROCEDURE — 96374 THER/PROPH/DIAG INJ IV PUSH: CPT

## 2022-07-29 PROCEDURE — 85025 COMPLETE CBC W/AUTO DIFF WBC: CPT

## 2022-07-29 PROCEDURE — 99285 EMERGENCY DEPT VISIT HI MDM: CPT

## 2022-07-29 PROCEDURE — 84484 ASSAY OF TROPONIN QUANT: CPT

## 2022-07-29 RX ORDER — BUDESONIDE AND FORMOTEROL FUMARATE DIHYDRATE 160; 4.5 UG/1; UG/1
2 AEROSOL RESPIRATORY (INHALATION) 2 TIMES DAILY
Status: DISCONTINUED | OUTPATIENT
Start: 2022-07-29 | End: 2022-08-03 | Stop reason: HOSPADM

## 2022-07-29 RX ORDER — ACETAMINOPHEN 325 MG/1
650 TABLET ORAL EVERY 6 HOURS PRN
Status: DISCONTINUED | OUTPATIENT
Start: 2022-07-29 | End: 2022-08-03 | Stop reason: HOSPADM

## 2022-07-29 RX ORDER — FUROSEMIDE 10 MG/ML
40 INJECTION INTRAMUSCULAR; INTRAVENOUS ONCE
Status: COMPLETED | OUTPATIENT
Start: 2022-07-29 | End: 2022-07-29

## 2022-07-29 RX ORDER — ONDANSETRON 2 MG/ML
4 INJECTION INTRAMUSCULAR; INTRAVENOUS EVERY 6 HOURS PRN
Status: DISCONTINUED | OUTPATIENT
Start: 2022-07-29 | End: 2022-08-03 | Stop reason: HOSPADM

## 2022-07-29 RX ORDER — POLYETHYLENE GLYCOL 3350 17 G/17G
17 POWDER, FOR SOLUTION ORAL DAILY PRN
Status: DISCONTINUED | OUTPATIENT
Start: 2022-07-29 | End: 2022-08-03 | Stop reason: HOSPADM

## 2022-07-29 RX ORDER — ACETAMINOPHEN 325 MG/1
650 TABLET ORAL EVERY 4 HOURS PRN
Status: DISCONTINUED | OUTPATIENT
Start: 2022-07-29 | End: 2022-07-29

## 2022-07-29 RX ORDER — POTASSIUM CHLORIDE 20 MEQ/1
40 TABLET, EXTENDED RELEASE ORAL 2 TIMES DAILY
Status: DISCONTINUED | OUTPATIENT
Start: 2022-07-29 | End: 2022-08-03 | Stop reason: HOSPADM

## 2022-07-29 RX ORDER — MAGNESIUM HYDROXIDE/ALUMINUM HYDROXICE/SIMETHICONE 120; 1200; 1200 MG/30ML; MG/30ML; MG/30ML
30 SUSPENSION ORAL EVERY 6 HOURS PRN
Status: DISCONTINUED | OUTPATIENT
Start: 2022-07-29 | End: 2022-08-03 | Stop reason: HOSPADM

## 2022-07-29 RX ORDER — ATORVASTATIN CALCIUM 10 MG/1
10 TABLET, FILM COATED ORAL DAILY
Status: DISCONTINUED | OUTPATIENT
Start: 2022-07-29 | End: 2022-08-03 | Stop reason: HOSPADM

## 2022-07-29 RX ORDER — LEVALBUTEROL INHALATION SOLUTION 1.25 MG/3ML
1.25 SOLUTION RESPIRATORY (INHALATION) EVERY 8 HOURS PRN
Status: DISCONTINUED | OUTPATIENT
Start: 2022-07-29 | End: 2022-07-29

## 2022-07-29 RX ORDER — INSULIN LISPRO 100 [IU]/ML
1-5 INJECTION, SOLUTION INTRAVENOUS; SUBCUTANEOUS
Status: DISCONTINUED | OUTPATIENT
Start: 2022-07-29 | End: 2022-08-03 | Stop reason: HOSPADM

## 2022-07-29 RX ORDER — INSULIN LISPRO 100 [IU]/ML
1-5 INJECTION, SOLUTION INTRAVENOUS; SUBCUTANEOUS
Status: DISCONTINUED | OUTPATIENT
Start: 2022-07-30 | End: 2022-08-03 | Stop reason: HOSPADM

## 2022-07-29 RX ORDER — ALBUTEROL SULFATE 90 UG/1
2 AEROSOL, METERED RESPIRATORY (INHALATION) EVERY 4 HOURS PRN
Status: DISCONTINUED | OUTPATIENT
Start: 2022-07-29 | End: 2022-08-03 | Stop reason: HOSPADM

## 2022-07-29 RX ORDER — MONTELUKAST SODIUM 10 MG/1
10 TABLET ORAL
Status: DISCONTINUED | OUTPATIENT
Start: 2022-07-29 | End: 2022-08-03 | Stop reason: HOSPADM

## 2022-07-29 RX ORDER — BUMETANIDE 0.25 MG/ML
3 INJECTION, SOLUTION INTRAMUSCULAR; INTRAVENOUS
Status: DISCONTINUED | OUTPATIENT
Start: 2022-07-29 | End: 2022-08-02

## 2022-07-29 RX ORDER — FUROSEMIDE 10 MG/ML
80 INJECTION INTRAMUSCULAR; INTRAVENOUS ONCE
Status: DISCONTINUED | OUTPATIENT
Start: 2022-07-29 | End: 2022-07-29

## 2022-07-29 RX ORDER — METOPROLOL SUCCINATE 50 MG/1
50 TABLET, EXTENDED RELEASE ORAL DAILY
Status: DISCONTINUED | OUTPATIENT
Start: 2022-07-29 | End: 2022-08-03 | Stop reason: HOSPADM

## 2022-07-29 RX ADMIN — POTASSIUM CHLORIDE 40 MEQ: 1500 TABLET, EXTENDED RELEASE ORAL at 18:28

## 2022-07-29 RX ADMIN — APIXABAN 5 MG: 5 TABLET, FILM COATED ORAL at 18:19

## 2022-07-29 RX ADMIN — MONTELUKAST SODIUM 10 MG: 10 TABLET, FILM COATED ORAL at 21:20

## 2022-07-29 RX ADMIN — SITAGLIPTIN 100 MG: 100 TABLET, FILM COATED ORAL at 16:53

## 2022-07-29 RX ADMIN — ALBUTEROL SULFATE 2 PUFF: 90 AEROSOL, METERED RESPIRATORY (INHALATION) at 17:58

## 2022-07-29 RX ADMIN — BUMETANIDE 3 MG: 0.25 INJECTION INTRAMUSCULAR; INTRAVENOUS at 15:47

## 2022-07-29 RX ADMIN — UMECLIDINIUM 1 PUFF: 62.5 AEROSOL, POWDER ORAL at 16:53

## 2022-07-29 RX ADMIN — BUDESONIDE AND FORMOTEROL FUMARATE DIHYDRATE 2 PUFF: 160; 4.5 AEROSOL RESPIRATORY (INHALATION) at 18:16

## 2022-07-29 RX ADMIN — FUROSEMIDE 40 MG: 10 INJECTION, SOLUTION INTRAMUSCULAR; INTRAVENOUS at 13:21

## 2022-07-29 NOTE — ED PROVIDER NOTES
History  Chief Complaint   Patient presents with    Shortness of Breath     Seen yesterday at Cardiologist for IV lasix  C/o sob, B/L leg swelling     79-year-old male patient with history of CHF, asthma, CAD presenting with shortness of breath onset 2 weeks ago  Patient also states that he has bilateral lower extremity edema  Patient states shortness of breath worsens with exertion and lying down flat  Patient has a CardioMEMS and states his pressures were high  Patient was seen by cardiologist and received 100 of lasix yesterday  Denies fever, chest pain, abdominal pain, nausea, vomiting, diarrhea admits to mild coughing  Patient is on Eliquis  Prior to Admission Medications   Prescriptions Last Dose Informant Patient Reported? Taking? Accu-Chek FastClix Lancets MISC  Self No No   Sig: Test blood sugar twice daily   Blood Glucose Monitoring Suppl (Accu-Chek Guide) w/Device KIT  Self No No   Sig: Use 2 (two) times a day Test blood sugar twice daily   MULTIPLE VITAMINS-CALCIUM PO  Self Yes No   Sig: Take 1 tablet by mouth daily   Symbicort 160-4 5 MCG/ACT inhaler 2022 at Unknown time  No Yes   Si PUFFS BY MOUTH TWICE DAILY , RINSE MOUTH THEN SPIT AFTER USE   albuterol (PROVENTIL HFA,VENTOLIN HFA) 90 mcg/act inhaler   No No   Sig: INHALE 2 PUFFS EVERY 4 (FOUR) HOURS AS NEEDED FOR WHEEZING OR SHORTNESS OF BREATH   apixaban (Eliquis) 5 mg 2022 at Unknown time Self No Yes   Sig: Take 1 tablet (5 mg total) by mouth 2 (two) times a day   atorvastatin (LIPITOR) 10 mg tablet 2022 at Unknown time Self No Yes   Sig: Take 1 tablet (10 mg total) by mouth in the morning     bumetanide (BUMEX) 1 mg tablet 2022 at Unknown time  No Yes   Sig: Take 3 tablets (3 mg total) by mouth 2 (two) times a day   glucose blood (Accu-Chek Guide) test strip  Self No No   Sig: Test blood sugar twice daily   guaiFENesin (MUCINEX) 600 mg 12 hr tablet Unknown at Unknown time Self No No   Sig: Take 1 tablet (600 mg total) by mouth 2 (two) times a day   levalbuterol (Xopenex) 1 25 mg/3 mL nebulizer solution   No No   Sig: Take 3 mL (1 25 mg total) by nebulization 3 (three) times a day   Patient taking differently: Take 1 25 mg by nebulization as needed   metoprolol succinate (TOPROL-XL) 50 mg 24 hr tablet 2022 at Unknown time Self No Yes   Sig: Take 1 tablet (50 mg total) by mouth daily   montelukast (SINGULAIR) 10 mg tablet  Self No No   Sig: Take 1 tablet (10 mg total) by mouth daily at bedtime   potassium chloride (K-DUR,KLOR-CON) 20 mEq tablet 2022 at Unknown time  No Yes   Sig: Take 2 tablets (40 mEq total) by mouth 2 (two) times a day   sitaGLIPtin (Januvia) 100 mg tablet 2022 at Unknown time Self No Yes   Sig: Take 1 tablet (100 mg total) by mouth in the morning    sodium chloride (OCEAN) 0 65 % nasal spray   No No   Si spray into each nostril 3 (three) times a day   Patient taking differently: 1 spray into each nostril if needed   tiotropium (Spiriva Respimat) 1 25 MCG/ACT AERS inhaler 2022 at Unknown time Self No Yes   Sig: Inhale 2 puffs daily      Facility-Administered Medications Last Administration Doses Remaining   furosemide (LASIX) injection 100 mg 2022  5:19 PM 0          Past Medical History:   Diagnosis Date    Acute gastritis without hemorrhage     last assessed 3/24/17    Asthma     Diabetes mellitus ([facility] [address] )     Gastric bypass status for obesity     Hyperlipidemia     Hypertension     Impaired fasting glucose     last assessed 5/10/17    Obesity     Viral gastroenteritis     last assessed 16       Past Surgical History:   Procedure Laterality Date    CARDIAC CATHETERIZATION N/A 3/9/2022    Procedure: CARDIAC RHC W/ PRESSURE SENSOR;  Surgeon: Bozena Joiner MD;  Location: [facility];   Service: Cardiology    CARPAL TUNNEL RELEASE      bilateral    GASTRIC BYPASS      with han en y    HERNIA REPAIR      ventral inscisional    TONSILLECTOMY Family History   Problem Relation Age of Onset    Stroke Mother     Hypertension Father     Cancer Father     COPD Father      I have reviewed and agree with the history as documented  E-Cigarette/Vaping    E-Cigarette Use Never User      E-Cigarette/Vaping Substances    Nicotine No     THC No     CBD No     Flavoring No     Other No     Unknown No      Social History     Tobacco Use    Smoking status: Former Smoker     Packs/day: 0 01     Years: 11 00     Pack years: 0 11     Types: Pipe, Cigars     Start date:      Quit date:      Years since quittin 5    Smokeless tobacco: Never Used    Tobacco comment: cigar once a day   Vaping Use    Vaping Use: Never used   Substance Use Topics    Alcohol use: Not Currently     Alcohol/week: 2 0 standard drinks     Types: 2 Shots of liquor per week     Comment: social    Drug use: No        Review of Systems   Constitutional: Negative for chills and fever  HENT: Negative for congestion, rhinorrhea and sore throat  Respiratory: Positive for cough and shortness of breath  Negative for chest tightness  Cardiovascular: Positive for leg swelling  Negative for chest pain  Gastrointestinal: Negative for abdominal pain, diarrhea, nausea and vomiting  Genitourinary: Negative for difficulty urinating and dysuria  Musculoskeletal: Negative for arthralgias, back pain and myalgias  Skin: Negative for rash and wound  Neurological: Negative for dizziness and headaches  All other systems reviewed and are negative        Physical Exam  ED Triage Vitals   Temperature Pulse Respirations Blood Pressure SpO2   22 1249 22 1249 22 1249 22 1249 22 1249   97 8 °F (36 6 °C) 80 18 120/67 96 %      Temp Source Heart Rate Source Patient Position - Orthostatic VS BP Location FiO2 (%)   22 1249 22 1249 22 1810 22 181 --   Oral Monitor Sitting Right arm       Pain Score       22 1810       No Pain             Orthostatic Vital Signs  Vitals:    07/29/22 2224 07/30/22 0307 07/30/22 0713 07/30/22 0714   BP: 157/81 142/79 145/79 145/79   Pulse:  72  73   Patient Position - Orthostatic VS:  Lying         Physical Exam  Vitals reviewed  Constitutional:       Appearance: Normal appearance  HENT:      Head: Normocephalic and atraumatic  Nose: Nose normal       Mouth/Throat:      Mouth: Mucous membranes are moist       Pharynx: Oropharynx is clear  Eyes:      Extraocular Movements: Extraocular movements intact  Conjunctiva/sclera: Conjunctivae normal    Cardiovascular:      Rate and Rhythm: Normal rate and regular rhythm  Pulses: Normal pulses  Heart sounds: Normal heart sounds  Pulmonary:      Effort: Pulmonary effort is normal       Breath sounds: Examination of the right-lower field reveals rales  Examination of the left-lower field reveals rales  Rales present  Abdominal:      General: Bowel sounds are normal       Palpations: Abdomen is soft  Tenderness: There is no abdominal tenderness  Musculoskeletal:         General: Normal range of motion  Cervical back: Normal range of motion  Right lower leg: Edema (2 + piting edema) present  Left lower leg: Edema (2+ piting edema) present  Skin:     General: Skin is warm and dry  Neurological:      General: No focal deficit present  Mental Status: He is alert and oriented to person, place, and time  Mental status is at baseline           ED Medications  Medications   bumetanide (BUMEX) injection 3 mg (3 mg Intravenous Given 7/29/22 1547)   albuterol (PROVENTIL HFA,VENTOLIN HFA) inhaler 2 puff (2 puffs Inhalation Given 7/30/22 0630)   apixaban (ELIQUIS) tablet 5 mg (5 mg Oral Given 7/29/22 1819)   atorvastatin (LIPITOR) tablet 10 mg (10 mg Oral Not Given 7/29/22 1653)   metoprolol succinate (TOPROL-XL) 24 hr tablet 50 mg (50 mg Oral Not Given 7/29/22 1653)   montelukast (SINGULAIR) tablet 10 mg (10 mg Oral Given 7/29/22 2120)   potassium chloride (K-DUR,KLOR-CON) CR tablet 40 mEq (40 mEq Oral Given 7/29/22 1828)   sitaGLIPtin (JANUVIA) tablet 100 mg (100 mg Oral Given 7/29/22 1653)   budesonide-formoterol (SYMBICORT) 160-4 5 mcg/act inhaler 2 puff (2 puffs Inhalation Given 7/29/22 1816)   umeclidinium bromide (INCRUSE ELLIPTA) 62 5 mcg/inh inhaler AEPB 1 puff (1 puff Inhalation Given 7/29/22 1653)   acetaminophen (TYLENOL) tablet 650 mg (has no administration in time range)   polyethylene glycol (MIRALAX) packet 17 g (has no administration in time range)   ondansetron (ZOFRAN) injection 4 mg (has no administration in time range)   aluminum-magnesium hydroxide-simethicone (MYLANTA) oral suspension 30 mL (has no administration in time range)   insulin lispro (HumaLOG) 100 units/mL subcutaneous injection 1-5 Units (1 Units Subcutaneous Not Given 7/30/22 0659)   insulin lispro (HumaLOG) 100 units/mL subcutaneous injection 1-5 Units (1 Units Subcutaneous Not Given 7/29/22 2120)   furosemide (LASIX) injection 40 mg (40 mg Intravenous Given 7/29/22 1321)       Diagnostic Studies  Results Reviewed     Procedure Component Value Units Date/Time    Basic metabolic panel [[id number]]  (Abnormal) Collected: 07/30/22 0535    Lab Status: Final result Specimen: Blood from Arm, Right Updated: 07/30/22 0646     Sodium 137 mmol/L      Potassium 4 1 mmol/L      Chloride 103 mmol/L      CO2 32 mmol/L      ANION GAP 2 mmol/L      BUN 30 mg/dL      Creatinine 1 70 mg/dL      Glucose 129 mg/dL      Calcium 8 6 mg/dL      eGFR 44 ml/min/1 73sq m     Narrative:      [place] guidelines for Chronic Kidney Disease (CKD):     Stage 1 with normal or high GFR (GFR > 90 mL/min/1 73 square meters)    Stage 2 Mild CKD (GFR = 60-89 mL/min/1 73 square meters)    Stage 3A Moderate CKD (GFR = 45-59 mL/min/1 73 square meters)    Stage 3B Moderate CKD (GFR = 30-44 mL/min/1 73 square meters)    Stage 4 Severe CKD (GFR = 15-29 mL/min/1 73 square meters)    Stage 5 End Stage CKD (GFR <15 mL/min/1 73 square meters)  Note: GFR calculation is accurate only with a steady state creatinine    Magnesium [[id number]]  (Normal) Collected: 07/30/22 0535    Lab Status: Final result Specimen: Blood from Arm, Right Updated: 07/30/22 0646     Magnesium 2 1 mg/dL     CBC (With Platelets) [[id number]]  (Abnormal) Collected: 07/30/22 0535    Lab Status: Final result Specimen: Blood from Arm, Right Updated: 07/30/22 0620     WBC 8 16 Thousand/uL      RBC 3 95 Million/uL      Hemoglobin 11 7 g/dL      Hematocrit 37 2 %      MCV 94 fL      MCH 29 6 pg      MCHC 31 5 g/dL      RDW 13 4 %      Platelets 364 Thousands/uL      MPV 10 5 fL     HS Troponin I 4hr [[id number]]  (Normal) Collected: 07/29/22 1752    Lab Status: Final result Specimen: Blood from Arm, Left Updated: 07/29/22 1823     hs TnI 4hr 12 ng/L      Delta 4hr hsTnI -1 ng/L     HS Troponin I 2hr [[id number]]  (Normal) Collected: 07/29/22 1550    Lab Status: Final result Specimen: Blood from Arm, Left Updated: 07/29/22 1634     hs TnI 2hr 12 ng/L      Delta 2hr hsTnI -1 ng/L     HS Troponin 0hr (reflex protocol) [[id number]]  (Normal) Collected: 07/29/22 1322    Lab Status: Final result Specimen: Blood from Arm, Left Updated: 07/29/22 1411     hs TnI 0hr 13 ng/L     NT-BNP PRO [[id number]]  (Abnormal) Collected: 07/29/22 1322    Lab Status: Final result Specimen: Blood from Arm, Left Updated: 07/29/22 1408     NT-proBNP 903 pg/mL     Comprehensive metabolic panel [[id number]]  (Abnormal) Collected: 07/29/22 1322    Lab Status: Final result Specimen: Blood from Arm, Left Updated: 07/29/22 1406     Sodium 136 mmol/L      Potassium 3 9 mmol/L      Chloride 101 mmol/L      CO2 28 mmol/L      ANION GAP 7 mmol/L      BUN 26 mg/dL      Creatinine 1 56 mg/dL      Glucose 139 mg/dL      Calcium 8 9 mg/dL      Corrected Calcium 9 5 mg/dL      AST 11 U/L      ALT 13 U/L      Alkaline Phosphatase 91 U/L      Total Protein 7 2 g/dL      Albumin 3 2 g/dL      Total Bilirubin 0 42 mg/dL      eGFR 49 ml/min/1 73sq m     Narrative:      [place] guidelines for Chronic Kidney Disease (CKD):     Stage 1 with normal or high GFR (GFR > 90 mL/min/1 73 square meters)    Stage 2 Mild CKD (GFR = 60-89 mL/min/1 73 square meters)    Stage 3A Moderate CKD (GFR = 45-59 mL/min/1 73 square meters)    Stage 3B Moderate CKD (GFR = 30-44 mL/min/1 73 square meters)    Stage 4 Severe CKD (GFR = 15-29 mL/min/1 73 square meters)    Stage 5 End Stage CKD (GFR <15 mL/min/1 73 square meters)  Note: GFR calculation is accurate only with a steady state creatinine    CBC and differential [[id number]]  (Abnormal) Collected: 07/29/22 1322    Lab Status: Final result Specimen: Blood from Arm, Left Updated: 07/29/22 1351     WBC 9 60 Thousand/uL      RBC 4 10 Million/uL      Hemoglobin 12 4 g/dL      Hematocrit 38 2 %      MCV 93 fL      MCH 30 2 pg      MCHC 32 5 g/dL      RDW 13 5 %      MPV 10 3 fL      Platelets 758 Thousands/uL      nRBC 0 /100 WBCs      Neutrophils Relative 61 %      Immat GRANS % 1 %      Lymphocytes Relative 17 %      Monocytes Relative 9 %      Eosinophils Relative 11 %      Basophils Relative 1 %      Neutrophils Absolute 5 94 Thousands/µL      Immature Grans Absolute 0 05 Thousand/uL      Lymphocytes Absolute 1 60 Thousands/µL      Monocytes Absolute 0 88 Thousand/µL      Eosinophils Absolute 1 05 Thousand/µL      Basophils Absolute 0 08 Thousands/µL                  XR chest 1 view portable   Final Result by Moreno Domingo MD (07/29 1336)      No acute cardiopulmonary disease        Findings are stable            Workstation performed: [id number]               Procedures  Procedures      ED Course                                       MDM  Number of Diagnoses or Management Options  Chronic diastolic congestive heart failure (HCC)  SOB (shortness of breath)  Diagnosis management comments: 21-year-old male patient with history of CHF presenting with shortness of breath  Patient states shortness of breath and leg swelling has been worsening  Patient was sent by cardiologist for admission  Lab show elevated BNP, elevated creatinine  Troponin negative  No chest pain  Will admit for CHF exacerbation  Amount and/or Complexity of Data Reviewed  Clinical lab tests: ordered and reviewed  Tests in the radiology section of CPT®: ordered and reviewed        Disposition  Final diagnoses:   Chronic diastolic congestive heart failure (HCC)   SOB (shortness of breath)     Time reflects when diagnosis was documented in both MDM as applicable and the Disposition within this note     Time User Action Codes Description Comment    7/29/2022  2:26 PM Masood KEARNEY HSPTL Add [L70 23] Chronic diastolic congestive heart failure ([facility] )     7/29/2022  2:26 PM Masood Talbot Seok Add [R06 02] SOB (shortness of breath)     7/29/2022  3:29 PM Aurelio Huston P Add [I50 33] Acute on chronic diastolic congestive heart failure [facility])       ED Disposition     ED Disposition   Admit    Condition   Stable    Date/Time   Fri Jul 29, 2022  2:26 PM    Comment   Case was discussed with Dr Antony Antonio and the patient's admission status was agreed to be Admission Status: inpatient status to the service of Dr Antony Antonio   Follow-up Information    None         Current Discharge Medication List      CONTINUE these medications which have NOT CHANGED    Details   apixaban (Eliquis) 5 mg Take 1 tablet (5 mg total) by mouth 2 (two) times a day  Qty: 60 tablet, Refills: 5    Associated Diagnoses: A-fib ([facility] ); Atrial fibrillation, unspecified type (HCC)      atorvastatin (LIPITOR) 10 mg tablet Take 1 tablet (10 mg total) by mouth in the morning    Qty: 30 tablet, Refills: 5    Associated Diagnoses: Mixed hyperlipidemia      bumetanide (BUMEX) 1 mg tablet Take 3 tablets (3 mg total) by mouth 2 (two) times a day  Qty: 180 tablet, Refills: 3 Associated Diagnoses: Acute on chronic heart failure with preserved ejection fraction ([facility])      metoprolol succinate (TOPROL-XL) 50 mg 24 hr tablet Take 1 tablet (50 mg total) by mouth daily  Qty: 90 tablet, Refills: 1    Associated Diagnoses: Chronic heart failure with preserved ejection fraction (HCC)      potassium chloride (K-DUR,KLOR-CON) 20 mEq tablet Take 2 tablets (40 mEq total) by mouth 2 (two) times a day  Qty: 120 tablet, Refills: 3    Associated Diagnoses: Hypokalemia      sitaGLIPtin (Januvia) 100 mg tablet Take 1 tablet (100 mg total) by mouth in the morning  Qty: 30 tablet, Refills: 5    Associated Diagnoses: Type 2 diabetes mellitus with hyperglycemia, without long-term current use of insulin ([facility])      Symbicort 160-4 5 MCG/ACT inhaler 2 PUFFS BY MOUTH TWICE DAILY , RINSE MOUTH THEN SPIT AFTER USE  Qty: 30 6 g, Refills: 2    Associated Diagnoses: Moderate persistent asthma with acute exacerbation      tiotropium (Spiriva Respimat) 1 25 MCG/ACT AERS inhaler Inhale 2 puffs daily  Qty: 4 g, Refills: 3    Associated Diagnoses:  Moderate persistent asthma with acute exacerbation      Accu-Chek FastClix Lancets MISC Test blood sugar twice daily  Qty: 200 each, Refills: 3    Associated Diagnoses: Type 2 diabetes mellitus with hyperglycemia, without long-term current use of insulin ([facility])      albuterol (PROVENTIL HFA,VENTOLIN HFA) 90 mcg/act inhaler INHALE 2 PUFFS EVERY 4 (FOUR) HOURS AS NEEDED FOR WHEEZING OR SHORTNESS OF BREATH  Qty: 18 g, Refills: 3    Associated Diagnoses: Mild intermittent asthma without complication      Blood Glucose Monitoring Suppl (Accu-Chek Guide) w/Device KIT Use 2 (two) times a day Test blood sugar twice daily  Qty: 1 kit, Refills: 0    Associated Diagnoses: Type 2 diabetes mellitus with hyperglycemia, without long-term current use of insulin ([facility])      glucose blood (Accu-Chek Guide) test strip Test blood sugar twice daily  Qty: 200 strip, Refills: 3    Associated Diagnoses: Type 2 diabetes mellitus with hyperglycemia, without long-term current use of insulin ([facility])      guaiFENesin (MUCINEX) 600 mg 12 hr tablet Take 1 tablet (600 mg total) by mouth 2 (two) times a day  Qty: 60 tablet, Refills: 0    Associated Diagnoses: Asthma exacerbation      levalbuterol (Xopenex) 1 25 mg/3 mL nebulizer solution Take 3 mL (1 25 mg total) by nebulization 3 (three) times a day  Qty: 270 mL, Refills: 3    Associated Diagnoses: Mild intermittent asthma without complication      montelukast (SINGULAIR) 10 mg tablet Take 1 tablet (10 mg total) by mouth daily at bedtime  Qty: 90 tablet, Refills: 1    Associated Diagnoses: Moderate persistent asthma with acute exacerbation      MULTIPLE VITAMINS-CALCIUM PO Take 1 tablet by mouth daily      sodium chloride (OCEAN) 0 65 % nasal spray 1 spray into each nostril 3 (three) times a day  Qty: 30 mL, Refills: 0    Associated Diagnoses: Asthma exacerbation           No discharge procedures on file  PDMP Review       Value Time User    PDMP Reviewed  Yes 9/23/2021 12:07 AM Victoriano Hahn DO           ED Provider  Attending physically available and evaluated Walker Cabello I managed the patient along with the ED Attending      Electronically Signed by         Tigist Bishop MD  07/30/22 1957

## 2022-07-29 NOTE — TELEPHONE ENCOUNTER
Wife just called and said the lasix iv did nothing and he is still having difficulty breathing and edema of the feet        Please advise

## 2022-07-29 NOTE — H&P
[address] [facility]  H&P- Virl Blood 1965, 56 y o  male MRN: [id number]  Unit/Bed#: ED 29 Encounter: [id number]  Primary Care Provider: Noelle Ni MD   Date and time admitted to hospital: 7/29/2022 12:35 PM    Acute on chronic diastolic congestive heart failure [facility])  Assessment & Plan  Wt Readings from Last 3 Encounters:   07/28/22 (!) 143 kg (314 lb 12 8 oz)   06/24/22 (!) 142 kg (312 lb 1 6 oz)   06/16/22 (!) 138 kg (304 lb)       · Patient with increased shortness of breath for a week and half  · Positive edema  · No least 4 lb weight gain  · Patient seen by cardiology specialist in the ER started on Bumex 3 mg IV b i d   · Continue diuretics      Type 2 diabetes mellitus with hyperglycemia ([facility] [address] )  Assessment & Plan  Lab Results   Component Value Date    HGBA1C 5 7 06/16/2022       No results for input(s): POCGLU in the last 72 hours  Blood Sugar Average: Last 72 hrs:  ·  continue outpatient newly  · Fingerstick sliding scale coverage    Essential hypertension  Assessment & Plan  · Continue Toprol XL 50 mg daily    Morbid obesity (HCC)  Assessment & Plan  · Morbid obesity noted  · Dietary maneuvers discussed    Moderate persistent asthma  Assessment & Plan  · Patient with history of moderate persistent asthma  · No wheezing on exam has patient will also with lower extremity edema weight gain  · Do not feel this is an asthma exacerbation by represents an exacerbation of congestive heart failure      VTE Pharmacologic Prophylaxis:   High Risk (Score >/= 5) - Pharmacological DVT Prophylaxis Ordered: apixaban (Eliquis)  Sequential Compression Devices Ordered  Code Status:  Full code  Discussion with family: Patient declined call to   Anticipated Length of Stay: Patient will be admitted on an inpatient basis with an anticipated length of stay of greater than 2 midnights secondary to Acute congestive heart failure requiring IV diuretics      Total Time for Visit, including Counseling / Coordination of Care: 30 minutes Greater than 50% of this total time spent on direct patient counseling and coordination of care  Chief Complaint:  I have been short of breath    History of Present Illness:  Carli Henriquez is a 56 y o  male with a PMH of heart failure preserved ejection fraction, type 2 diabetes and moderate persistent asthma who presents with increasing shortness of breath over the last 1 and half weeks  Patient reports 1/2 weeks of increasing shortness of breath  Also notable some PND  Positive edema with at least 4 lb weight gain  Was seen by Cardiology on outpatient basis yesterday receive IV Lasix 100 mg  Patient also has a cardiomems device implanted  Reports increasing pressures of last week  Had his Bumex increased to 3 mg b i d  For 1 mg b i d  Yesterday  He had no significant improvement or urine output came to hospital for evaluation  Denies any chest pain fevers chills night sweats no cough    Review of Systems:  Review of Systems   Constitutional: Negative for chills, diaphoresis, fatigue and fever  Eyes: Negative for photophobia and visual disturbance  Respiratory: Positive for shortness of breath  Negative for cough, wheezing and stridor  Cardiovascular: Positive for leg swelling  Negative for chest pain and palpitations  Gastrointestinal: Negative for abdominal pain, constipation, diarrhea, nausea and vomiting  Endocrine: Negative for polydipsia, polyphagia and polyuria  Musculoskeletal: Negative for neck pain  Neurological: Negative for weakness  Hematological: Does not bruise/bleed easily  Psychiatric/Behavioral: Negative for confusion         Past Medical and Surgical History:   Past Medical History:   Diagnosis Date    Acute gastritis without hemorrhage     last assessed 3/24/17    Asthma     Diabetes mellitus ([facility] [address] )     Gastric bypass status for obesity     Hyperlipidemia     Hypertension     Impaired fasting glucose     last assessed 5/10/17    Obesity     Viral gastroenteritis     last assessed 11/4/16       Past Surgical History:   Procedure Laterality Date    CARDIAC CATHETERIZATION N/A 3/9/2022    Procedure: CARDIAC RHC W/ PRESSURE SENSOR;  Surgeon: Keshawn Ruelas MD;  Location: [facility]; Service: Cardiology    CARPAL TUNNEL RELEASE      bilateral    GASTRIC BYPASS  2004    with han en y   [phone]      ventral inscisional    TONSILLECTOMY         Meds/Allergies:  Prior to Admission medications    Medication Sig Start Date End Date Taking? Authorizing Provider   Accu-Chek FastClix Lancets MISC Test blood sugar twice daily 11/16/21   Fabiano Section, MD   albuterol (PROVENTIL HFA,VENTOLIN HFA) 90 mcg/act inhaler INHALE 2 PUFFS EVERY 4 (FOUR) HOURS AS NEEDED FOR WHEEZING OR SHORTNESS OF BREATH 7/6/22   [place] MD Romario   apixaban (Eliquis) 5 mg Take 1 tablet (5 mg total) by mouth 2 (two) times a day 3/23/22   Maggie Sweeney PA-C   atorvastatin (LIPITOR) 10 mg tablet Take 1 tablet (10 mg total) by mouth in the morning   5/18/22   Fabiano MD Romario   Blood Glucose Monitoring Suppl (Accu-Chek Guide) w/Device KIT Use 2 (two) times a day Test blood sugar twice daily 8/5/21   Fabiano MD Romario   bumetanide (BUMEX) 1 mg tablet Take 3 tablets (3 mg total) by mouth 2 (two) times a day 7/28/22   Keshawn Ruelas MD   glucose blood (Accu-Chek Guide) test strip Test blood sugar twice daily 9/29/21   [place] Section, MD   guaiFENesin (MUCINEX) 600 mg 12 hr tablet Take 1 tablet (600 mg total) by mouth 2 (two) times a day 11/15/21   RODRIGUE [employer]   levalbuterol (Xopenex) 1 25 mg/3 mL nebulizer solution Take 3 mL (1 25 mg total) by nebulization 3 (three) times a day  Patient taking differently: Take 1 25 mg by nebulization as needed 11/1/21   RODRIGUE [employer]   metoprolol succinate (TOPROL-XL) 50 mg 24 hr tablet Take 1 tablet (50 mg total) by mouth daily 3/14/22 Rosalind Herrera PA-C   montelukast (SINGULAIR) 10 mg tablet Take 1 tablet (10 mg total) by mouth daily at bedtime 2/3/22 7/28/22  Lizzie Britton MD   MULTIPLE VITAMINS-CALCIUM PO Take 1 tablet by mouth daily 16   Historical Provider, MD   potassium chloride (K-DUR,KLOR-CON) 20 mEq tablet Take 2 tablets (40 mEq total) by mouth 2 (two) times a day 22   Joann Quijano MD   sitaGLIPtin (Januvia) 100 mg tablet Take 1 tablet (100 mg total) by mouth in the morning  22   Lizzie Britton MD   sodium chloride (OCEAN) 0 65 % nasal spray 1 spray into each nostril 3 (three) times a day  Patient taking differently: 1 spray into each nostril if needed 11/15/21   RODRIGUE Smiley   Symbicort 160-4 5 MCG/ACT inhaler 2 PUFFS BY MOUTH TWICE DAILY , RINSE MOUTH THEN SPIT AFTER USE 22   Lizzie Britton MD   tiotropium (Spiriva Respimat) 1 25 MCG/ACT AERS inhaler Inhale 2 puffs daily 2/3/22   Lizzie Britton MD     I have reviewed home medications with patient personally  Allergies:    Allergies   Allergen Reactions    Azithromycin Other (See Comments)     Shaky, uneasy feeling     Bactrim [Sulfamethoxazole-Trimethoprim] Other (See Comments)     shakiness       Social History:  Marital Status: /Civil Union   Occupation:   Patient Pre-hospital Living Situation: Home  Patient Pre-hospital Level of Mobility: walks  Patient Pre-hospital Diet Restrictions:   Substance Use History:   Social History     Substance and Sexual Activity   Alcohol Use Not Currently    Alcohol/week: 2 0 standard drinks    Types: 2 Shots of liquor per week    Comment: social     Social History     Tobacco Use   Smoking Status Former Smoker    Packs/day: 0 01    Years: 11 00    Pack years: 0 11    Types: Pipe, Cigars    Start date:     Quit date: 2020    Years since quittin 5   Smokeless Tobacco Never Used   Tobacco Comment    cigar once a day     Social History     Substance and Sexual Activity   Drug Use No       Family History:  Family History   Problem Relation Age of Onset    Stroke Mother     Hypertension Father     Cancer Father     COPD Father        Physical Exam:     Vitals:   Blood Pressure: 124/74 (07/29/22 1500)  Pulse: 74 (07/29/22 1500)  Temperature: 97 8 °F (36 6 °C) (07/29/22 1249)  Temp Source: Oral (07/29/22 1249)  Respirations: 20 (07/29/22 1500)  SpO2: 95 % (07/29/22 1500)    Physical Exam  Constitutional:       General: He is not in acute distress  Appearance: He is obese  He is not diaphoretic  HENT:      Head: Normocephalic and atraumatic  Eyes:      General: No scleral icterus  Cardiovascular:      Rate and Rhythm: Normal rate  Rhythm irregular  Pulses: Normal pulses  Heart sounds: Normal heart sounds  No murmur heard  No friction rub  No gallop  Pulmonary:      Effort: Pulmonary effort is normal  No respiratory distress  Breath sounds: Normal breath sounds  No wheezing, rhonchi or rales  Abdominal:      General: Abdomen is flat  There is no distension  Palpations: Abdomen is soft  There is no mass  Tenderness: There is no abdominal tenderness  There is no guarding or rebound  Musculoskeletal:      Right lower leg: Edema present  Left lower leg: Edema present  Comments: Trace to 1+ bilateral pedal edema to mid calf   Skin:     General: Skin is warm and dry  Neurological:      Mental Status: He is oriented to person, place, and time     Psychiatric:         Mood and Affect: Mood normal           Additional Data:     Lab Results:  Results from last 7 days   Lab Units 07/29/22  1322   WBC Thousand/uL 9 60   HEMOGLOBIN g/dL 12 4   HEMATOCRIT % 38 2   PLATELETS Thousands/uL 324   NEUTROS PCT % 61   LYMPHS PCT % 17   MONOS PCT % 9   EOS PCT % 11*     Results from last 7 days   Lab Units 07/29/22  1322   SODIUM mmol/L 136   POTASSIUM mmol/L 3 9   CHLORIDE mmol/L 101   CO2 mmol/L 28   BUN mg/dL 26*   CREATININE mg/dL 1 56* ANION GAP mmol/L 7   CALCIUM mg/dL 8 9   ALBUMIN g/dL 3 2*   TOTAL BILIRUBIN mg/dL 0 42   ALK PHOS U/L 91   ALT U/L 13   AST U/L 11   GLUCOSE RANDOM mg/dL 139                       Imaging: Reviewed radiology reports from this admission including: chest xray  XR chest 1 view portable   Final Result by Estela Cates MD (07/29 1336)      No acute cardiopulmonary disease  Findings are stable            Workstation performed: [id number]             EKG and Other Studies Reviewed on Admission:   · EKG: No EKG obtained  ** Please Note: This note has been constructed using a voice recognition system   **

## 2022-07-29 NOTE — ASSESSMENT & PLAN NOTE
Wt Readings from Last 3 Encounters:   07/28/22 (!) 143 kg (314 lb 12 8 oz)   06/24/22 (!) 142 kg (312 lb 1 6 oz)   06/16/22 (!) 138 kg (304 lb)       · Patient with increased shortness of breath for a week and half  · Positive edema  · No least 4 lb weight gain  · Patient seen by cardiology specialist in the ER started on Bumex 3 mg IV b i d   · Continue diuretics

## 2022-07-29 NOTE — TELEPHONE ENCOUNTER
Spoke with pt, receive lasix 100 mg IV yesterday  He said he is the same  Wt unchanged at 314 lbs, edema unchanged  Has started the increase dose of Bumex and potassium this am     He will call on Monday if no change in symptoms

## 2022-07-29 NOTE — ED ATTENDING ATTESTATION
7/29/2022  I, Johnie Weiss MD, saw and evaluated the patient  I have discussed the patient with the resident/non-physician practitioner and agree with the resident's/non-physician practitioner's findings, Plan of Care, and MDM as documented in the resident's/non-physician practitioner's note, except where noted  All available labs and Radiology studies were reviewed  I was present for key portions of any procedure(s) performed by the resident/non-physician practitioner and I was immediately available to provide assistance  At this point I agree with the current assessment done in the Emergency Department    I have conducted an independent evaluation of this patient a history and physical is as follows:  C/o sob  Has asthma and chf   CKD    And dm  1 5 weeks of  SOB  Worse lying flat or walking     Not on home o2    Coughing  No sputum    Diastolic HF     B/l leg swelling worse than ususal   On bumex   No fevers no chest pain no chest pain  Exam the patient is in no acute distress neck no JVD lungs with bibasilar crackles heart is regular abdomen is soft nontender extremities bilateral edema  Impression volume overload  Cardiac workup  ED Course         Critical Care Time  Procedures

## 2022-07-29 NOTE — CONSULTS
Consultation - Advanced Heart Failure Team  Alba Orta 56 y o  male MRN: [id number]  Unit/Bed#: ED 29 Encounter: [id number]      Consults  PCP: Fabiano Doss MD  Outpatient Cardiologist: Karlie Alegre MD      History of Present Illness   Physician Requesting Consult: Brina Caicedo MD  Reason for Consult / Principal Problem: shortness of breath    HPI: Alba Orta is a 56y o  year old male with a history of heart failure with preserved ejection fraction, asthma, HTN, HLD, paroxysmal afib, and diabetes  He presented to the ED for evaluation after an urgent visit to heart failure clinic yesterday where he was reporting worsening symptoms of dyspnea with exertion  He had a CardioMEMS implant in March 2022 and has reported PA pressures above his goal  He was recently increased on his outpatient diuretic dosing to Bumex 3mg BID PO and also received IV Furosemide 100mg in the clinic but feels he has not significantly increased his urine output  He does not report any recent infection, illness, wheezing, cough, dysuria, palpitations, or chest pain  Review of Systems  Review of system was conducted and was negative except for as stated in the HPI  Historical Information   Past Medical History:   Diagnosis Date    Acute gastritis without hemorrhage     last assessed 3/24/17    Asthma     [facility] mellitus ([facility] [address] )     Gastric bypass status for obesity     Hyperlipidemia     Hypertension     Impaired fasting glucose     last assessed 5/10/17    Obesity     Viral gastroenteritis     last assessed 11/4/16     Past Surgical History:   Procedure Laterality Date    CARDIAC CATHETERIZATION N/A 3/9/2022    Procedure: CARDIAC RHC W/ PRESSURE SENSOR;  Surgeon: Keshawn Ruelas MD;  Location: [facility];   Service: Cardiology    CARPAL TUNNEL RELEASE      bilateral    GASTRIC BYPASS  2004    with han en y   [address]      ventral inscisional    TONSILLECTOMY       Social History     Substance and Sexual Activity   Alcohol Use Not Currently    Alcohol/week: 2 0 standard drinks    Types: 2 Shots of liquor per week    Comment: social     Social History     Substance and Sexual Activity   Drug Use No     Social History     Tobacco Use   Smoking Status Former Smoker    Packs/day: 0 01    Years: 11 00    Pack years: 0 11    Types: Pipe, Cigars    Start date:     Quit date:     Years since quittin 5   Smokeless Tobacco Never Used   Tobacco Comment    cigar once a day     Family History: non-contributory    Meds/Allergies   Hospital Medications:   Current Facility-Administered Medications   Medication Dose Route Frequency    acetaminophen (TYLENOL) tablet 650 mg  650 mg Oral Q6H PRN    albuterol (PROVENTIL HFA,VENTOLIN HFA) inhaler 2 puff  2 puff Inhalation Q4H PRN    aluminum-magnesium hydroxide-simethicone (MYLANTA) oral suspension 30 mL  30 mL Oral Q6H PRN    apixaban (ELIQUIS) tablet 5 mg  5 mg Oral BID    atorvastatin (LIPITOR) tablet 10 mg  10 mg Oral Daily    budesonide-formoterol (SYMBICORT) 160-4 5 mcg/act inhaler 2 puff  2 puff Inhalation BID    bumetanide (BUMEX) injection 3 mg  3 mg Intravenous BID (diuretic)    insulin lispro (HumaLOG) 100 units/mL subcutaneous injection 1-5 Units  1-5 Units Subcutaneous TID AC    insulin lispro (HumaLOG) 100 units/mL subcutaneous injection 1-5 Units  1-5 Units Subcutaneous HS    metoprolol succinate (TOPROL-XL) 24 hr tablet 50 mg  50 mg Oral Daily    montelukast (SINGULAIR) tablet 10 mg  10 mg Oral HS    ondansetron (ZOFRAN) injection 4 mg  4 mg Intravenous Q6H PRN    polyethylene glycol (MIRALAX) packet 17 g  17 g Oral Daily PRN    potassium chloride (K-DUR,KLOR-CON) CR tablet 40 mEq  40 mEq Oral BID    sitaGLIPtin (JANUVIA) tablet 100 mg  100 mg Oral Daily    umeclidinium bromide (INCRUSE ELLIPTA) 62 5 mcg/inh inhaler AEPB 1 puff  1 puff Inhalation Daily     Home Medications: (Not in a hospital admission)      Allergies   Allergen Reactions    Azithromycin Other (See Comments)     Shaky, uneasy feeling     Bactrim [Sulfamethoxazole-Trimethoprim] Other (See Comments)     shakiness       Objective   Vitals: Blood pressure 120/64, pulse 76, temperature 97 8 °F (36 6 °C), temperature source Oral, resp  rate 21, SpO2 95 %  Orthostatic Blood Pressures    Flowsheet Row Most Recent Value   Blood Pressure 120/64 filed at 07/29/2022 1552            Invasive Devices  Report    Peripheral Intravenous Line  Duration           Peripheral IV 07/29/22 Left;Proximal;Ventral (anterior) Forearm <1 day                Physical Exam  GEN: Dagmar Jeronimo appears well, alert and oriented x 3, pleasant and cooperative   HEENT:  Normocephalic, atraumatic, anicteric, moist mucous membranes  NECK: Mild JVD present    HEART: Regular rate and rhythm, no murmur  LUNGS: Diminished air entry bilaterally  ABDOMEN:  Normoactive bowel sounds, soft, no tenderness, no distention  EXTREMITIES: trace pitting edema bilaterally    SKIN:  Dry, intact, warm to touch    Lab Results: I have personally reviewed pertinent lab results  Results from last 7 days   Lab Units 07/29/22  1322   HS TNI 0HR ng/L 13     Results from last 7 days   Lab Units 07/29/22  1322   NT-PRO BNP pg/mL 903*     Results from last 7 days   Lab Units 07/29/22  1322 07/27/22  1115   POTASSIUM mmol/L 3 9 4 0   CO2 mmol/L 28 30   CHLORIDE mmol/L 101 105   BUN mg/dL 26* 23   CREATININE mg/dL 1 56* 1 75*     Results from last 7 days   Lab Units 07/29/22  1322   HEMOGLOBIN g/dL 12 4   HEMATOCRIT % 38 2   PLATELETS Thousands/uL 324                 Imaging: I have personally reviewed pertinent reports          EKG:   Date: 7/29  Interpretation: Sinus rhythm, low precordial voltages, slight QT prolongation         ECHO:  Results for orders placed during the hospital encounter of 07/18/21    Echo complete with contrast if indicated    Narrative  [address]  [phone]    Transthoracic Echocardiogram  2D, M-mode, Doppler, and Color Doppler    Study date:  2021    Patient: Rylan Felix  MR number: [id number]  Account number: [de-identified]  : 1965  Age: 49 years  Gender: Male  Status: Inpatient  Location: Bedside  Height: 73 in  Weight: 359 3 lb  BP: 177/ 91 mmHg    Indications: Assess congestive heart failure  Diagnoses: I42 9 - Cardiomyopathy, unspecified    Sonographer:  TOSHIA Butler  Primary Physician:  Laz Ochoa MD  Group:  [facility]  Cardiology Fellow:  Buddy Medina  Interpreting Physician:  Christopher Medina MD    SUMMARY    LEFT VENTRICLE:  Size was at the upper limits of normal   Systolic function was at the lower limits of normal  Ejection fraction was estimated to be 50 %  There were no regional wall motion abnormalities  There was akinesis of the basal inferior and basal-mid inferolateral wall(s)  There was no evidence of concentric hypertrophy  Doppler parameters were consistent with abnormal left ventricular relaxation (grade 1 diastolic dysfunction)  LEFT ATRIUM:  The atrium was mildly dilated  MITRAL VALVE:  There was mild annular calcification  There was trace regurgitation  TRICUSPID VALVE:  There was trace regurgitation  IVC, HEPATIC VEINS:  The inferior vena cava was mildly dilated  PROCEDURE: The procedure was performed at the bedside  This was a routine study  The transthoracic approach was used  The study included complete 2D imaging, M-mode, complete spectral Doppler, and color Doppler  The heart rate was 87 bpm,  at the start of the study  Images were obtained from the parasternal, apical, subcostal, and suprasternal notch acoustic windows  Echocardiographic views were limited due to restricted patient mobility, decreased penetration, and lung  interference  This was a technically difficult study      LEFT VENTRICLE: Size was at the upper limits of normal  Systolic function was at the lower limits of normal  Ejection fraction was estimated to be 50 %  There were no regional wall motion abnormalities  There was akinesis of the basal  inferior and basal-mid inferolateral wall(s)  Wall thickness was normal  There was no evidence of concentric hypertrophy  DOPPLER: Doppler parameters were consistent with abnormal left ventricular relaxation (grade 1 diastolic dysfunction)  RIGHT VENTRICLE: The size was normal  Systolic function was normal  Wall thickness was normal     LEFT ATRIUM: The atrium was mildly dilated  RIGHT ATRIUM: Size was normal     MITRAL VALVE: There was mild annular calcification  Valve structure was normal  There was normal leaflet separation  DOPPLER: The transmitral velocity was within the normal range  There was no evidence for stenosis  There was trace  regurgitation  AORTIC VALVE: The valve was trileaflet  Leaflets exhibited mildly increased thickness, mild calcification, and normal cuspal separation  DOPPLER: Transaortic velocity was within the normal range  There was no evidence for stenosis  There  was no significant regurgitation  TRICUSPID VALVE: The valve structure was normal  There was normal leaflet separation  DOPPLER: The transtricuspid velocity was within the normal range  There was no evidence for stenosis  There was trace regurgitation  PULMONIC VALVE: Leaflets exhibited normal thickness, no calcification, and normal cuspal separation  DOPPLER: The transpulmonic velocity was within the normal range  There was no significant regurgitation  PERICARDIUM: There was no pericardial effusion  The pericardium was normal in appearance  AORTA: The root exhibited normal size  The ascending aorta was normal in size  SYSTEMIC VEINS: IVC: The inferior vena cava was mildly dilated   Respirophasic changes were normal     SYSTEM MEASUREMENT TABLES    2D  %FS: 21 53 %  Ao Diam: 3 19 cm  Ao asc: 3 32 cm  EDV(Teich): 188 84 ml  EF(Teich): 42 83 %  ESV(Teich): 107 95 ml  IVSd: 0 8 cm  LA Area: 19 94 cm2  LA Diam: 4 66 cm  LVEDV MOD A4C: 126 08 ml  LVEF MOD A4C: 46 23 %  LVESV MOD A4C: 67 8 ml  LVIDd: 6 13 cm  LVIDs: 4 81 cm  LVLd A4C: 8 23 cm  LVLs A4C: 7 32 cm  LVPWd: 0 78 cm  RA Area: 20 73 cm2  RVIDd: 3 74 cm  SV MOD A4C: 58 28 ml  SV(Teich): 80 89 ml    MM  TAPSE: 2 77 cm    PW  E' Sept: 0 08 m/s  E/E' Sept: 9 01  MV A Zach: 0 84 m/s  MV Dec [place]: 3 3 m/s2  MV DecT: 228 2 ms  MV E Zach: 0 75 m/s  MV E/A Ratio: 0 89  MV PHT: 66 18 ms  MVA By PHT: 3 32 cm2    [address] Accredited Echocardiography Laboratory    Prepared and electronically signed by    Beulah Serrano MD  Signed 20-Jul-2021 07:13:15    No results found for this or any previous visit  Assessment/Plan     Shortness of breath likely of mixed cardiac / pulmonary etiology    Acute Heart failure with preserved LV ejection fraction (55%)     Chronic obstructive lung disease / asthma     Paroxysmal Atrial fibrillation    HTN    HLD    DM2    Class III Obesity with VICKY    56year old male former smoker with longstanding heart failure with preserved EF, now presenting with several days of worsening shortness of breath with elevated PA pressure sensor readings and concern for worsening HF  Prior echocardiography showed EF 45% in 2020 with dilated LV and left atrium and no significant valvular disease  EF had improved to 55% in 11/2021, but with persistent LV dilatation (6 2cm)  Patient reporting elevated readings on his CardioMEMS device with PA diastolic pressures of 57-20HXMH with spikes to 25mmHg, compared to 11mmHg on implant  nt-proBNP mildly elevated at 900  Review of records showing PFTs in 7/2021 with severe obstruction (FEV1/FVC ratio of 49%) with bronchodilator responsiveness and markedly elevated residual volume consistent with air trapping  Corrected DLCO of 65%        ECG unchanged with normal sinus rhythm and no ischemic changes  Exam does not reveal significant crackles and CXR is relatively clear  He may have a component of heart failure driving his symptoms, though much of his symptom burden may be driven by lung disease  Will recommend diuresis with IV Bumetanide 3mg BID, given that he has responded to this dose in the past and may be demonstrating poor response to PO due to bowel edema  Add Potassium Chloride 40mEq BID to ensure 5 0 > K > 4 0  Monitor Mg to maintain > 2 0   Daily weights and strict I/O  2g Salt restriction and 2L fluid restriction  Continue Metoprolol Succinate 50mg QD and Apixaban 5mg BID for atrial fibrillation       Harris Lacey MD  Cardiology Fellow

## 2022-07-29 NOTE — TELEPHONE ENCOUNTER
Spoke with Dr Kenneth Hansen, advised her of pt 's and wife calls  Dr Kenneth Hansen wants him to go to ER  Called a pt and advised  Verbally understood  He will go to ER

## 2022-07-29 NOTE — UTILIZATION REVIEW
Inpatient Admission Authorization Request   NOTIFICATION OF INPATIENT ADMISSION/INPATIENT AUTHORIZATION REQUEST   SERVICING FACILITY:   [place] [facility]  Address: [address], [address] [place] [address]  Tax ID: [id number]  NPI: [id number]  Place of Service: Inpatient [address] Code: 24     ATTENDING PROVIDER:  Attending Name and NPI#: Fabián Jenkins, Ronnie Walton [[id number]]  Address: [address] [place] [address], [address] [place] [address]  Phone: [phone]     UTILIZATION REVIEW CONTACT:  Harrison Pascual, Utilization   Network Utilization Review Department  Phone: [phone]  Fax: [phone]  Email: [email or web address]     PHYSICIAN ADVISORY SERVICES:  FOR [facility] REVIEW - MEDICAL NECESSITY DENIAL  Phone: [phone]  Fax: [phone]  Email: [email or web address]     TYPE OF REQUEST:  Inpatient Status     ADMISSION INFORMATION:  ADMISSION DATE/TIME: 7/29/22  2:27 PM  PATIENT DIAGNOSIS CODE/DESCRIPTION:  Respiratory distress [R06 03]  DISCHARGE DATE/TIME: No discharge date for patient encounter  IMPORTANT INFORMATION:  Please contact Harrison Pascual directly with any questions or concerns regarding this request  Department voicemails are confidential     Send requests for admission clinical reviews, concurrent reviews, approvals, and administrative denials due to lack of clinical to fax [phone]

## 2022-07-29 NOTE — PROGRESS NOTES
Advanced Heart Failure Outpatient Progress Note - Julia Moses 56 y o  male MRN: [id number]    Encounter: [id number]      Assessment/Plan:    Patient Active Problem List    Diagnosis Date Noted    Leg cramps 06/16/2022    Paroxysmal atrial fibrillation ([facility] ) 03/01/2022    Stage 3a chronic kidney disease ([facility] ) 12/23/2021    Hypokalemia 11/14/2021    CHF (congestive heart failure) ([facility] ) 11/12/2021    Moderate persistent asthma 10/11/2021    Dyspnea on exertion 10/11/2021    Hyponatremia 07/31/2021    Cardiomyopathy ([facility] ) 07/19/2021    Well adult exam 05/14/2021    Decreased left ventricular systolic function 65/22/3666    Bilateral leg edema 02/19/2020    Edema of both feet 01/23/2020    VICKY (obstructive sleep apnea)     Daytime sleepiness 07/17/2019    Mixed hyperlipidemia 04/18/2019    Morbid obesity ([facility] ) 04/18/2019    Vitamin B12 deficiency 04/18/2019    Vitamin D deficiency 04/18/2019    Knee sprain, bilateral 06/14/2018    Primary osteoarthritis of both knees 06/14/2018    Irritable bowel syndrome with diarrhea 01/30/2018    Essential hypertension 01/30/2018    Type 2 diabetes mellitus with hyperglycemia ([facility] ) 01/30/2018     # Chronic HFpEF, Stage C, NYHA III    Volume up on exam today, recent CardioMems PAD 18-19mmHg     Weight:  312 lbs 6/24/22  NT proBNP: 609 4/8/22      Studies- personally reviewed by [facility] 3/9/22:   RA 4   RV 35/4   PA 35/12/21   PCWP 10   PA sat 64%   Travis CO 5 56 L/min   Travis CI 2 2L/min/m2   PVR 1 97 SAGASTUME     TTE 03/25/2020: LVEF 40-45%  LVIDd 6 12 cm  Normal RV     TTE 07/19/2021: LVEF 50%  LVIDd 6 13 cm  Grade 1 DD  Normal RV  Trace MR and TR  Mildly dilated IVC  TTE 11/12/2021: LVEF 55%  LVIDd 6 0 cm  Grade 1 DD  Normal RV  Trace TR        Neurohormonal Blockade:   --Beta Blocker: metoprolol succinate 50 mg daily     --ARNi / ACEi / ARB: No     --Aldosterone Antagonist: No     --SGLT2 Inhibitor: No    --Diuretic: bumex 2mg in AM and 1mg in PM, potassium 20 meq BID - recently uptitrated and now up to bumex 2mg TID and potassium 60 meq total daily since yesterday    Sudden Cardiac Death Risk Reduction:   --LVEF >35%         Electrical Resynchronization:   --Candidacy for BiV device: narrow QRS       Advanced Therapies:   Will continue to monitor  LVEF recovered      # Atrial fibrillation   [id number] = 3 (HF, HTN, DM)  Diagnosed 02/2022  Anticoagulation on Eliquis  Rate control: BB as above  Rhythm control: No       # Hypertension, controlled       # Hyperlipidemia   Most recent lipid panel from 10/01/2021: cholesterol 157; TG 90;   HDL 66; calculated LDL 73  Rx: atorvastatin 10 mg daily         # Diabetes mellitus, type II     Non-insulin dependent  HbA1c 6/16/22: 5 7%      # Obstructive sleep apnea  # Chronic respiratory failure   # Asthma, moderate persistent    # S/p gastric bypass (Samuel-en-Y)surgery    # History of tobacco abuse   # Carpal tunnel syndrome, bilateral    # CKD, Cr 1 43   # s/p CardioMems implant 3/9/22     TODAY'S PLAN:  Volume up on exam today, elevated JVP, PADs elevated and above goal as above  Give lasix 100mg IV in office today  Increase bumex to 3mg BID and potassium to 40 meq BID  BMP 8/1/22  2g sodium diet  2L fluid restriction  Daily weights  Phone call follow up tomorrow  If no improvement in diuresis trial of metolazone vs admission      HPI:   Nikki Robledo is a 53-year-old man with a PMH as above who presents to the office for follow-up      Admitted to [facility] from 12/23 to 01/01/2022 after presenting with worsening SOB  Started on IV Lasix (later switched to IV Bumex) and IV steroids  Did receive 2 doses of metolazone while inpatient  Transitioned to PO torsemide on day of discharge  Lost 12 lbs and net negative 15 L this admission       Presented to [facility]- ED on 02/12/2022 with worsening SOB  Diagnosed with Afib and was started on Eliquis and BB   Also received 80 mg IV Lasix, and was discharged home       02/22/2022: Patient presents for hospital follow-up  Has been feeling "good" until yesterday  Developed TELLES and noted orthopnea overnight  Also with recently worsening of cough resulting in increased use of PRN inhalers/nebulizers  Denies recent dietary indiscretion  Drinking no more than 2000 mL daily  Is completing daily weights; reports down-trending weights over past few days  Has noted slight decrease in response to PO torsemide       Admitted to [facility] from 03/01 to 03/10/2022 after presenting with worsening SOB and LE swelling  Started on IV Lasix and IV steroids  Later was switched to IV Bumex on 03/04  Transitioned to PO Bumex on 03/07  CardioMEMS implanted on 03/09  Lost 7 lbs and net negative 8 1 L this admission       03/14/2022: Patient presents for hospital follow-up  No current complaints  Weights stable at home in low 310s lbs range  No issues with CardioMEMS system at home  Still waiting for new CPAP machine       Presented to [facility] ED on 04/08 at the direction of his pulmonologist due to worsening SOB and TELLES, productive cough, and wheezing  In ED received albuterol nebulizer treatment  CXR without acute cardiopulmonary disease  Heart failure team recommended increasing Bumex to 2 mg qAM and 1 mg qPM with close outpatient follow-up       Contacted by HF RN on 04/08 to review diuretic dosing changes  Patient reported no improvement since ED visit and presented to [address] [place] [address] ED for second opinion/further evaluation  Admitted to [address] [place] [address] from 04/08 to 04/11/2022  Started on IV steroids and IV Lasix  Diagnosed with acute asthma and HF exacerbations  Lost 2 lbs this admission  Discharged on PO steroid taper      04/15/2022: Patient presents for hospital follow-up appointment  Reviewed recent hospital course(s)  No current complaints   Has continued taking Bumex 2 mg qAm and 1 mg qPM  Is completing daily weights and denies any significant weight gain since returning home  Still waiting on having new CPAP machine delivered  Planning to return to work next week    6/24/22: Here for follow up  Overall doing well  Recent treatments for asthma exacerbation  Last steroid course 3 weeks ago  He is overall doing well  No shortness of breath, PND or orthopnea  Mild lower extremity edema  Home scale not working  Recent CardioMems PADs trending up  Interval History:  7/28/22: Here for urgent visit due to worsening shortness of breath, weight gain and lower extremity edema  Noted symptoms since 7/25  Was getting extra doses of bumex with up to 2mg TID yesterday with no significant response  Correlates with PADs above goal  Reports adherence to diet and meds  Review of Systems   Constitutional: Negative for chills and fever  HENT: Negative for ear pain and sore throat  Eyes: Negative for pain and visual disturbance  Respiratory: Negative for cough and shortness of breath  Cardiovascular: Negative for chest pain and palpitations  Gastrointestinal: Negative for abdominal distention, abdominal pain and vomiting  Genitourinary: Negative for dysuria and hematuria  Musculoskeletal: Negative for arthralgias and back pain  Skin: Negative for color change and rash  Neurological: Negative for dizziness, seizures, syncope and light-headedness  All other systems reviewed and are negative  Past Medical History:   Diagnosis Date    Acute gastritis without hemorrhage     last assessed 3/24/17    Asthma     Diabetes mellitus [facility] )     Gastric bypass status for obesity     Hyperlipidemia     Hypertension     Impaired fasting glucose     last assessed 5/10/17    Obesity     Viral gastroenteritis     last assessed 11/4/16         Allergies   Allergen Reactions    Azithromycin Other (See Comments)     Shaky, uneasy feeling     Bactrim [Sulfamethoxazole-Trimethoprim] Other (See Comments)     shakiness           Current Outpatient Medications:     Accu-Chek FastClix Lancets MISC, Test blood sugar twice daily, Disp: 200 each, Rfl: 3    albuterol (PROVENTIL HFA,VENTOLIN HFA) 90 mcg/act inhaler, INHALE 2 PUFFS EVERY 4 (FOUR) HOURS AS NEEDED FOR WHEEZING OR SHORTNESS OF BREATH, Disp: 18 g, Rfl: 3    apixaban (Eliquis) 5 mg, Take 1 tablet (5 mg total) by mouth 2 (two) times a day, Disp: 60 tablet, Rfl: 5    atorvastatin (LIPITOR) 10 mg tablet, Take 1 tablet (10 mg total) by mouth in the morning , Disp: 30 tablet, Rfl: 5    Blood Glucose Monitoring Suppl (Accu-Chek Guide) w/Device KIT, Use 2 (two) times a day Test blood sugar twice daily, Disp: 1 kit, Rfl: 0    bumetanide (BUMEX) 1 mg tablet, Take 3 tablets (3 mg total) by mouth 2 (two) times a day, Disp: 180 tablet, Rfl: 3    glucose blood (Accu-Chek Guide) test strip, Test blood sugar twice daily, Disp: 200 strip, Rfl: 3    guaiFENesin (MUCINEX) 600 mg 12 hr tablet, Take 1 tablet (600 mg total) by mouth 2 (two) times a day, Disp: 60 tablet, Rfl: 0    levalbuterol (Xopenex) 1 25 mg/3 mL nebulizer solution, Take 3 mL (1 25 mg total) by nebulization 3 (three) times a day (Patient taking differently: Take 1 25 mg by nebulization as needed), Disp: 270 mL, Rfl: 3    metoprolol succinate (TOPROL-XL) 50 mg 24 hr tablet, Take 1 tablet (50 mg total) by mouth daily, Disp: 90 tablet, Rfl: 1    montelukast (SINGULAIR) 10 mg tablet, Take 1 tablet (10 mg total) by mouth daily at bedtime, Disp: 90 tablet, Rfl: 1    MULTIPLE VITAMINS-CALCIUM PO, Take 1 tablet by mouth daily, Disp: , Rfl:     potassium chloride (K-DUR,KLOR-CON) 20 mEq tablet, Take 2 tablets (40 mEq total) by mouth 2 (two) times a day, Disp: 120 tablet, Rfl: 3    sitaGLIPtin (Januvia) 100 mg tablet, Take 1 tablet (100 mg total) by mouth in the morning , Disp: 30 tablet, Rfl: 5    sodium chloride (OCEAN) 0 65 % nasal spray, 1 spray into each nostril 3 (three) times a day (Patient taking differently: 1 spray into each nostril if needed), Disp: 30 mL, Rfl: 0    Symbicort 160-4 5 MCG/ACT inhaler, 2 PUFFS BY MOUTH TWICE DAILY , RINSE MOUTH THEN SPIT AFTER USE, Disp: 30 6 g, Rfl: 2    tiotropium (Spiriva Respimat) 1 25 MCG/ACT AERS inhaler, Inhale 2 puffs daily, Disp: 4 g, Rfl: 3  No current facility-administered medications for this visit  Social History     Socioeconomic History    Marital status: /Civil Union     Spouse name: Not on file    Number of children: Not on file    Years of education: Not on file    Highest education level: Not on file   Occupational History    Not on file   Tobacco Use    Smoking status: Former Smoker     Packs/day: 0 01     Years: 11 00     Pack years: 0 11     Types: Pipe, Cigars     Start date:      Quit date:      Years since quittin 5    Smokeless tobacco: Never Used    Tobacco comment: cigar once a day   Vaping Use    Vaping Use: Never used   Substance and Sexual Activity    Alcohol use: Yes     Alcohol/week: 2 0 standard drinks     Types: 2 Shots of liquor per week     Comment: social    Drug use: No    Sexual activity: Yes     Partners: Female     Birth control/protection: None   Other Topics Concern    Not on file   Social History Narrative    Not on file     Social Determinants of Health     Financial Resource Strain: Not on file   Food Insecurity: No Food Insecurity    Worried About Running Out of Food in the Last Year: Never true    Aria of Food in the Last Year: Never true   Transportation Needs: No Transportation Needs    Lack of Transportation (Medical): No    Lack of Transportation (Non-Medical):  No   Physical Activity: Not on file   Stress: Not on file   Social Connections: Not on file   Intimate Partner Violence: Not on file   Housing Stability: Unknown    Unable to Pay for Housing in the Last Year: No    Number of Places Lived in the Last Year: Not on file    Unstable Housing in the Last Year: No       Family History   Problem Relation Age of Onset    Stroke Mother     Hypertension Father  Cancer Father     COPD Father        Physical Exam:    Vitals:   Vitals:    07/28/22 1639   BP: 114/68   Pulse: 93   SpO2: 97%       Physical Exam  Constitutional:       General: He is not in acute distress  Appearance: Normal appearance  HENT:      Head: Normocephalic and atraumatic  Mouth/Throat:      Mouth: Mucous membranes are moist    Eyes:      General: No scleral icterus  Extraocular Movements: Extraocular movements intact  Cardiovascular:      Rate and Rhythm: Normal rate and regular rhythm  Pulses: Normal pulses  Heart sounds: S1 normal and S2 normal  No murmur heard  No friction rub  No gallop  Comments: Elevated JVP  Mostly nonpitting edema bilaterally  Pulmonary:      Effort: Pulmonary effort is normal       Breath sounds: Normal breath sounds  Abdominal:      General: There is no distension  Palpations: Abdomen is soft  Tenderness: There is no abdominal tenderness  There is no guarding or rebound  Musculoskeletal:         General: Normal range of motion  Cervical back: Neck supple  Skin:     General: Skin is warm and dry  Capillary Refill: Capillary refill takes less than 2 seconds  Neurological:      General: No focal deficit present  Mental Status: He is alert and oriented to person, place, and time     Psychiatric:         Mood and Affect: Mood normal          Labs & Results:    Lab Results   Component Value Date    SODIUM 138 07/27/2022    K 4 0 07/27/2022     07/27/2022    CO2 30 07/27/2022    BUN 23 07/27/2022    CREATININE 1 75 (H) 07/27/2022    GLUC 105 07/27/2022    CALCIUM 8 9 07/27/2022     Lab Results   Component Value Date    WBC 11 27 (H) 04/08/2022    HGB 12 4 04/08/2022    HCT 37 0 04/08/2022    MCV 90 04/08/2022     04/08/2022     Lab Results   Component Value Date    NTBNP 609 (H) 04/08/2022      Lab Results   Component Value Date    CHOLESTEROL 157 10/01/2021    CHOLESTEROL 215 (H) 01/28/2020 CHOLESTEROL 196 04/23/2019     Lab Results   Component Value Date    HDL 66 10/01/2021    HDL 46 01/28/2020    HDL 40 04/23/2019     Lab Results   Component Value Date    TRIG 90 10/01/2021    TRIG 126 01/28/2020    TRIG 126 04/23/2019     Lab Results   Component Value Date    [place] 91 10/01/2021    [place] 169 01/28/2020    [place] 156 04/23/2019       EKG personally reviewed by Cas Childers MD      Counseling / Coordination of Care  Time spent today 25 minutes  Greater than 50% of total time was spent with the patient and / or family counseling and / or coordination of care  We went over current diagnosis, most recent studies and any changes in treatment  Thank you for the opportunity to participate in the care of this patient      Shaheen Fontana MD  ADVANCED HEART FAILURE AND MECHANICAL CIRCULATORY SUPPORT  [facility]

## 2022-07-29 NOTE — ASSESSMENT & PLAN NOTE
Lab Results   Component Value Date    HGBA1C 5 7 06/16/2022       No results for input(s): POCGLU in the last 72 hours      Blood Sugar Average: Last 72 hrs:  ·  continue outpatient newly  · Fingerstick sliding scale coverage

## 2022-07-29 NOTE — ASSESSMENT & PLAN NOTE
· Patient with history of moderate persistent asthma  · No wheezing on exam has patient will also with lower extremity edema weight gain  · Do not feel this is an asthma exacerbation by represents an exacerbation of congestive heart failure

## 2022-07-30 PROBLEM — J06.9 URI (UPPER RESPIRATORY INFECTION): Status: ACTIVE | Noted: 2022-07-30

## 2022-07-30 LAB
ANION GAP SERPL CALCULATED.3IONS-SCNC: 2 MMOL/L (ref 4–13)
BUN SERPL-MCNC: 30 MG/DL (ref 5–25)
CALCIUM SERPL-MCNC: 8.6 MG/DL (ref 8.3–10.1)
CHLORIDE SERPL-SCNC: 103 MMOL/L (ref 96–108)
CO2 SERPL-SCNC: 32 MMOL/L (ref 21–32)
CREAT SERPL-MCNC: 1.7 MG/DL (ref 0.6–1.3)
ERYTHROCYTE [DISTWIDTH] IN BLOOD BY AUTOMATED COUNT: 13.4 % (ref 11.6–15.1)
GFR SERPL CREATININE-BSD FRML MDRD: 44 ML/MIN/1.73SQ M
GLUCOSE SERPL-MCNC: 129 MG/DL (ref 65–140)
GLUCOSE SERPL-MCNC: 139 MG/DL (ref 65–140)
GLUCOSE SERPL-MCNC: 145 MG/DL (ref 65–140)
GLUCOSE SERPL-MCNC: 150 MG/DL (ref 65–140)
GLUCOSE SERPL-MCNC: 158 MG/DL (ref 65–140)
HCT VFR BLD AUTO: 37.2 % (ref 36.5–49.3)
HGB BLD-MCNC: 11.7 G/DL (ref 12–17)
MAGNESIUM SERPL-MCNC: 2.1 MG/DL (ref 1.6–2.6)
MCH RBC QN AUTO: 29.6 PG (ref 26.8–34.3)
MCHC RBC AUTO-ENTMCNC: 31.5 G/DL (ref 31.4–37.4)
MCV RBC AUTO: 94 FL (ref 82–98)
PLATELET # BLD AUTO: 259 THOUSANDS/UL (ref 149–390)
PMV BLD AUTO: 10.5 FL (ref 8.9–12.7)
POTASSIUM SERPL-SCNC: 4.1 MMOL/L (ref 3.5–5.3)
RBC # BLD AUTO: 3.95 MILLION/UL (ref 3.88–5.62)
SODIUM SERPL-SCNC: 137 MMOL/L (ref 135–147)
WBC # BLD AUTO: 8.16 THOUSAND/UL (ref 4.31–10.16)

## 2022-07-30 PROCEDURE — 82948 REAGENT STRIP/BLOOD GLUCOSE: CPT

## 2022-07-30 PROCEDURE — 80048 BASIC METABOLIC PNL TOTAL CA: CPT | Performed by: INTERNAL MEDICINE

## 2022-07-30 PROCEDURE — 85027 COMPLETE CBC AUTOMATED: CPT | Performed by: INTERNAL MEDICINE

## 2022-07-30 PROCEDURE — 83735 ASSAY OF MAGNESIUM: CPT | Performed by: INTERNAL MEDICINE

## 2022-07-30 PROCEDURE — 99232 SBSQ HOSP IP/OBS MODERATE 35: CPT | Performed by: INTERNAL MEDICINE

## 2022-07-30 PROCEDURE — 99232 SBSQ HOSP IP/OBS MODERATE 35: CPT | Performed by: NURSE PRACTITIONER

## 2022-07-30 RX ORDER — AMLODIPINE BESYLATE 5 MG/1
5 TABLET ORAL DAILY
Status: DISCONTINUED | OUTPATIENT
Start: 2022-07-30 | End: 2022-08-03 | Stop reason: HOSPADM

## 2022-07-30 RX ORDER — BENZONATATE 100 MG/1
100 CAPSULE ORAL 3 TIMES DAILY
Status: DISCONTINUED | OUTPATIENT
Start: 2022-07-30 | End: 2022-08-03 | Stop reason: HOSPADM

## 2022-07-30 RX ORDER — LORATADINE 10 MG/1
10 TABLET ORAL DAILY
Status: DISCONTINUED | OUTPATIENT
Start: 2022-07-30 | End: 2022-08-03 | Stop reason: HOSPADM

## 2022-07-30 RX ADMIN — UMECLIDINIUM 1 PUFF: 62.5 AEROSOL, POWDER ORAL at 08:25

## 2022-07-30 RX ADMIN — POTASSIUM CHLORIDE 40 MEQ: 1500 TABLET, EXTENDED RELEASE ORAL at 17:29

## 2022-07-30 RX ADMIN — BENZONATATE 100 MG: 100 CAPSULE ORAL at 17:29

## 2022-07-30 RX ADMIN — AMLODIPINE BESYLATE 5 MG: 5 TABLET ORAL at 10:01

## 2022-07-30 RX ADMIN — LORATADINE 10 MG: 10 TABLET ORAL at 11:35

## 2022-07-30 RX ADMIN — METOPROLOL SUCCINATE 50 MG: 50 TABLET, EXTENDED RELEASE ORAL at 08:24

## 2022-07-30 RX ADMIN — ATORVASTATIN CALCIUM 10 MG: 10 TABLET, FILM COATED ORAL at 08:24

## 2022-07-30 RX ADMIN — APIXABAN 5 MG: 5 TABLET, FILM COATED ORAL at 17:29

## 2022-07-30 RX ADMIN — SITAGLIPTIN 100 MG: 100 TABLET, FILM COATED ORAL at 08:24

## 2022-07-30 RX ADMIN — BUDESONIDE AND FORMOTEROL FUMARATE DIHYDRATE 2 PUFF: 160; 4.5 AEROSOL RESPIRATORY (INHALATION) at 17:34

## 2022-07-30 RX ADMIN — BUMETANIDE 3 MG: 0.25 INJECTION INTRAMUSCULAR; INTRAVENOUS at 17:30

## 2022-07-30 RX ADMIN — MONTELUKAST SODIUM 10 MG: 10 TABLET, FILM COATED ORAL at 21:47

## 2022-07-30 RX ADMIN — BENZONATATE 100 MG: 100 CAPSULE ORAL at 11:35

## 2022-07-30 RX ADMIN — BUDESONIDE AND FORMOTEROL FUMARATE DIHYDRATE 2 PUFF: 160; 4.5 AEROSOL RESPIRATORY (INHALATION) at 08:25

## 2022-07-30 RX ADMIN — BUMETANIDE 3 MG: 0.25 INJECTION INTRAMUSCULAR; INTRAVENOUS at 08:25

## 2022-07-30 RX ADMIN — POTASSIUM CHLORIDE 40 MEQ: 1500 TABLET, EXTENDED RELEASE ORAL at 08:24

## 2022-07-30 RX ADMIN — INSULIN LISPRO 1 UNITS: 100 INJECTION, SOLUTION INTRAVENOUS; SUBCUTANEOUS at 21:46

## 2022-07-30 RX ADMIN — APIXABAN 5 MG: 5 TABLET, FILM COATED ORAL at 08:24

## 2022-07-30 RX ADMIN — INSULIN LISPRO 1 UNITS: 100 INJECTION, SOLUTION INTRAVENOUS; SUBCUTANEOUS at 11:28

## 2022-07-30 RX ADMIN — BENZONATATE 100 MG: 100 CAPSULE ORAL at 21:47

## 2022-07-30 RX ADMIN — ALBUTEROL SULFATE 2 PUFF: 90 AEROSOL, METERED RESPIRATORY (INHALATION) at 06:30

## 2022-07-30 NOTE — ASSESSMENT & PLAN NOTE
Some mild nasal congestion - possibly contributing to productive cough   · Will order Claritin for allergies   · Question environmental allergies vs viral   · Order tessalon pearls for cough TID

## 2022-07-30 NOTE — ASSESSMENT & PLAN NOTE
Lab Results   Component Value Date    HGBA1C 5 7 06/16/2022       Recent Labs     07/29/22  2048 07/30/22  0613 07/30/22  1106 07/30/22  1624   POCGLU 134 139 150* 145*       Blood Sugar Average: Last 72 hrs:  · (P) 142 continue outpatient newly  · Fingerstick sliding scale coverage

## 2022-07-30 NOTE — PLAN OF CARE
Problem: Potential for Falls  Goal: Patient will remain free of falls  Description: INTERVENTIONS:  - Educate patient/family on patient safety including physical limitations  - Instruct patient to call for assistance with activity   - Consult OT/PT to assist with strengthening/mobility   - Keep Call bell within reach  - Keep bed low and locked with side rails adjusted as appropriate  - Keep care items and personal belongings within reach  - Initiate and maintain comfort rounds  - Make Fall Risk Sign visible to staff  - Offer Toileting every  Hours, in advance of need  - Initiate/Maintain alarm  - Obtain necessary fall risk management equipment  - Apply yellow socks and bracelet for high fall risk patients  - Consider moving patient to room near nurses station  Outcome: Progressing     Problem: Knowledge Deficit  Goal: Patient/family/caregiver demonstrates understanding of disease process, treatment plan, medications, and discharge instructions  Description: Complete learning assessment and assess knowledge base    Interventions:  - Provide teaching at level of understanding  - Provide teaching via preferred learning methods  Outcome: Progressing     Problem: CARDIOVASCULAR - ADULT  Goal: Maintains optimal cardiac output and hemodynamic stability  Description: INTERVENTIONS:  - Monitor I/O, vital signs and rhythm  - Monitor for S/S and trends of decreased cardiac output  - Administer and titrate ordered vasoactive medications to optimize hemodynamic stability  - Assess quality of pulses, skin color and temperature  - Assess for signs of decreased coronary artery perfusion  - Instruct patient to report change in severity of symptoms  Outcome: Progressing  Goal: Absence of cardiac dysrhythmias or at baseline rhythm  Description: INTERVENTIONS:  - Continuous cardiac monitoring, vital signs, obtain 12 lead EKG if ordered  - Administer antiarrhythmic and heart rate control medications as ordered  - Monitor electrolytes and administer replacement therapy as ordered  Outcome: Progressing     Problem: METABOLIC, FLUID AND ELECTROLYTES - ADULT  Goal: Electrolytes maintained within normal limits  Description: INTERVENTIONS:  - Monitor labs and assess patient for signs and symptoms of electrolyte imbalances  - Administer electrolyte replacement as ordered  - Monitor response to electrolyte replacements, including repeat lab results as appropriate  - Instruct patient on fluid and nutrition as appropriate  Outcome: Progressing

## 2022-07-30 NOTE — ASSESSMENT & PLAN NOTE
Lab Results   Component Value Date    EGFR 44 07/30/2022    EGFR 49 07/29/2022    EGFR 42 07/27/2022    CREATININE 1 70 (H) 07/30/2022    CREATININE 1 56 (H) 07/29/2022    CREATININE 1 75 (H) 07/27/2022   · B/L 1 6 - 1 70   · strict I/so daily weights  · Avoid nephrotoxins   · Avoid hypotension   · Continue iv bumex and trend renal function   ·

## 2022-07-30 NOTE — PROGRESS NOTES
Heart Failure/ Pulmonary Hypertension Progress Note - Walker Cabello 56 y o  male MRN: [id number]    Unit/Bed#: [facility] 510-01 Encounter: [id number]      Assessment:    Principal Problem:    Acute on chronic diastolic congestive heart failure (HCC)  Active Problems:    Essential hypertension    Type 2 diabetes mellitus with hyperglycemia (HCC)    Morbid obesity (HCC)    Moderate persistent asthma        Objective: Intake/ Output:  Not accurate  Weight: 310 lbs  Tele:     MAPs 100    # Acute on Chronic HFpEF- HFimpeEF, Stage C, NYHA III     Volume up on exam today, recent CardioMems PAD 18-19mmHg, now up to mid 20's   Diuretic: recent increase to Bumex 3 mg BID w/ K 40 meq BID   Weight:  314 lbs   NT proBNP: 7/29/22: 113   490 4/8/22        Studies- personally reviewed by [facility] 3/9/22:    RA 4    RV 35/4    PA 35/12/21    PCWP 10    PA sat 64%    Travis CO 5 56 L/min    Travis CI 2 2L/min/m2    PVR 1 97 SAGASTUME        TTE 11/12/2021:   LVEF 55%  LVIDd 6 0 cm  Grade 1 DD  Normal RV  Trace TR  TTE 07/19/2021:   LVEF 50%  LVIDd 6 13 cm  Grade 1 DD  Normal RV  Trace MR and TR  Mildly dilated IVC      TTE 03/25/2020:   LVEF 40-45%  LVIDd 6 12 cm  Normal RV           Neurohormonal Blockade:    --Beta Blocker: metoprolol succinate 50 mg daily      --ARNi / ACEi / ARB: No      --Aldosterone Antagonist: No      --SGLT2 Inhibitor: No     --Diuretic: bumex 2mg in AM and 1mg in PM, potassium 20 meq BID   - recently uptitrated and   now up to bumex 2mg TID and potassium 60 meq total daily since yesterday       Sudden Cardiac Death Risk Reduction:    --LVEF >35%           Electrical Resynchronization:    --Candidacy for BiV device: narrow QRS           Advanced Therapies:  Will continue to monitor   LVEF recovered        # Atrial fibrillation    [id number] = 3 (HF, HTN, DM)      Diagnosed 02/2022     Anticoagulation on Eliquis      Rate control: metoprolol 50 mg daily  Rhythm control: No         # Hypertension, controlled      # Hyperlipidemia   Most recent lipid panel from 10/01/2021: cholesterol 157; TG 90;   HDL 66; calculated LDL 73     Rx: atorvastatin 10 mg daily            # Diabetes mellitus, type II      Non-insulin dependent     HbA1c 6/16/22: 5 7%        # Obstructive sleep apnea   # Chronic respiratory failure    # Asthma, moderate persistent     # S/p gastric bypass (Samuel-en-Y)surgery     # History of tobacco abuse    # Carpal tunnel syndrome, bilateral     # CKD, Cr 1 56 - 1 7 today  # s/p CardioMems implant 3/9/22    PAD 25 mmHg- most recently - which is up for him       TODAY'S PLAN:   Diurese with Bumex 3 mg IV BID - continue today  1 8 liter fluid restriction   Pt with poor air entry bilaterally so likely some pulmonary contribution of his TELLES   AC and beta blocker on board for afib   Recommend SGLT2i as outpt  Start amlodipine 5 mg daily- avoiding ACE / ARB with renal function    Review of Systems   Constitutional: Negative for activity change, appetite change, fatigue and unexpected weight change  HENT: Negative for congestion and nosebleeds  Eyes: Negative  Respiratory: Positive for shortness of breath  Negative for cough and chest tightness  Cardiovascular: Positive for leg swelling  Negative for chest pain and palpitations  Gastrointestinal: Negative for abdominal distention  Endocrine: Negative  Genitourinary: Negative  Musculoskeletal: Negative  Skin: Negative  Neurological: Negative for dizziness, syncope and weakness  Hematological: Negative  Psychiatric/Behavioral: Negative  [employer] (day, reason): Cotton catheter (day, reason):    Vitals: Blood pressure 145/79, pulse 73, temperature 98 °F (36 7 °C), temperature source Oral, resp  rate 18, height 6' 1" (1 854 m), weight (!) 141 kg (310 lb 10 1 oz), SpO2 93 %  , Body mass index is 40 98 kg/m² , I/O last 3 completed shifts:  In: -   Out: 1100 [Urine:1100]  I/O this shift:  In: 180 [P O :180]  Out: 300 [Urine:300]  Wt Readings from Last 3 Encounters:   07/30/22 (!) 141 kg (310 lb 10 1 oz)   07/28/22 (!) 143 kg (314 lb 12 8 oz)   06/24/22 (!) 142 kg (312 lb 1 6 oz)       Intake/Output Summary (Last 24 hours) at 7/30/2022 0934  Last data filed at 7/30/2022 0353  Gross per 24 hour   Intake 180 ml   Output 1400 ml   Net -1220 ml     I/O last 3 completed shifts:  In: -   Out: 1100 [Urine:1100]    No significant arrhythmias seen on telemetry review         Physical Exam:  Vitals:    07/30/22 0307 07/30/22 0600 07/30/22 0713 07/30/22 0714   BP: 142/79  145/79 145/79   BP Location: Right arm      Pulse: 72   73   Resp: 18      Temp: 97 9 °F (36 6 °C)  98 °F (36 7 °C) 98 °F (36 7 °C)   TempSrc: Oral   Oral   SpO2: 95%   93%   Weight:  (!) 141 kg (310 lb 10 1 oz)     Height:           GEN: Raul Jarrell appears well, alert and oriented x 3, pleasant and cooperative   HEENT: pupils equal, round, and reactive to light; extraocular muscles intact  NECK: supple, no carotid bruits   HEART: regular rhythm, normal S1 and S2, no murmurs, clicks, gallops or rubs, JVP is difficult to assess  LUNGS: clear to auscultation bilaterally; no wheezes, rales, or rhonchi   ABDOMEN: normal bowel sounds, soft, no tenderness, no distention  EXTREMITIES: peripheral pulses normal; no clubbing, cyanosis, positive edema  NEURO: no focal findings   SKIN: normal without suspicious lesions on exposed skin      Current Facility-Administered Medications:     acetaminophen (TYLENOL) tablet 650 mg, 650 mg, Oral, Q6H PRN, Jefe Recio MD    albuterol (PROVENTIL HFA,VENTOLIN HFA) inhaler 2 puff, 2 puff, Inhalation, Q4H PRN, Jefe Recio MD, 2 puff at 07/30/22 0630    aluminum-magnesium hydroxide-simethicone (MYLANTA) oral suspension 30 mL, 30 mL, Oral, Q6H PRN, Jefe Recio MD    apixaban (ELIQUIS) tablet 5 mg, 5 mg, Oral, BID, Jefe Recio MD, 5 mg at 07/30/22 0824    atorvastatin (LIPITOR) tablet 10 mg, 10 mg, Oral, Daily, Jefe Recio MD, 10 mg at 07/30/22 0824    budesonide-formoterol (SYMBICORT) 160-4 5 mcg/act inhaler 2 puff, 2 puff, Inhalation, BID, Eliezer Ibrahim MD, 2 puff at 07/30/22 0825    bumetanide (BUMEX) injection 3 mg, 3 mg, Intravenous, BID (diuretic), Eliezer Ibrahim MD, 3 mg at 07/30/22 0825    insulin lispro (HumaLOG) 100 units/mL subcutaneous injection 1-5 Units, 1-5 Units, Subcutaneous, TID AC **AND** Fingerstick Glucose (POCT), , , TID AC, Servando Huston MD    insulin lispro (HumaLOG) 100 units/mL subcutaneous injection 1-5 Units, 1-5 Units, Subcutaneous, HS, Eliezer Ibrahim MD    metoprolol succinate (TOPROL-XL) 24 hr tablet 50 mg, 50 mg, Oral, Daily, Eliezer Ibrahim MD, 50 mg at 07/30/22 0824    montelukast (SINGULAIR) tablet 10 mg, 10 mg, Oral, HS, Eliezer Ibrahim MD, 10 mg at 07/29/22 2120    ondansetron (ZOFRAN) injection 4 mg, 4 mg, Intravenous, Q6H PRN, Eliezer Ibrahim MD    polyethylene glycol (MIRALAX) packet 17 g, 17 g, Oral, Daily PRN, Eliezer Ibrahim MD    potassium chloride (K-DUR,KLOR-CON) CR tablet 40 mEq, 40 mEq, Oral, BID, Eliezer Ibrahim MD, 40 mEq at 07/30/22 0824    sitaGLIPtin (JANUVIA) tablet 100 mg, 100 mg, Oral, Daily, Eliezer Ibrahim MD, 100 mg at 07/30/22 0824    umeclidinium bromide (INCRUSE ELLIPTA) 62 5 mcg/inh inhaler AEPB 1 puff, 1 puff, Inhalation, Daily, Eliezer Ibrahim MD, 1 puff at 07/30/22 0825      Labs & Results:        Results from last 7 days   Lab Units 07/30/22  0535 07/29/22  1322   WBC Thousand/uL 8 16 9 60   HEMOGLOBIN g/dL 11 7* 12 4   HEMATOCRIT % 37 2 38 2   PLATELETS Thousands/uL 259 324         Results from last 7 days   Lab Units 07/30/22  0535 07/29/22  1322 07/27/22  1115   POTASSIUM mmol/L 4 1 3 9 4 0   CHLORIDE mmol/L 103 101 105   CO2 mmol/L 32 28 30   BUN mg/dL 30* 26* 23   CREATININE mg/dL 1 70* 1 56* 1 75*   CALCIUM mg/dL 8 6 8 9 8 9   ALK PHOS U/L  --  91  --    ALT U/L  --  13  --    AST U/L  --  11  --            Counseling / Coordination of Care  Total floor / unit time spent today 25 minutes  Greater than 50% of total time was spent with the patient and / or family counseling and / or coordination of care  A description of the counseling / coordination of care: 15  Thank you for the opportunity to participate in the care of this patient    [facility] PULMONARY HYPERTENSION  MEDICAL DIRECTOR OF [place]

## 2022-07-30 NOTE — UTILIZATION REVIEW
Initial Clinical Review    Admission: Date/Time/Statement:   Admission Orders (From admission, onward)     Ordered        07/29/22 1427  INPATIENT ADMISSION  Once                      Orders Placed This Encounter   Procedures    INPATIENT ADMISSION     Standing Status:   Standing     Number of Occurrences:   1     Order Specific Question:   Level of Care     Answer:   Med Surg [16]     Order Specific Question:   Estimated length of stay     Answer:   More than 2 Midnights     Order Specific Question:   Certification     Answer:   I certify that inpatient services are medically necessary for this patient for a duration of greater than two midnights  See H&P and MD Progress Notes for additional information about the patient's course of treatment  ED Arrival Information     Expected   -    Arrival   7/29/2022 12:34    Acuity   Urgent            Means of arrival   Ambulance    Escorted by   Tuan Wilson    Service   Hospitalist    Admission type   Elective            Arrival complaint   resp  distress           Chief Complaint   Patient presents with    Shortness of Breath     Seen yesterday at Cardiologist for IV lasix  C/o sob, B/L leg swelling       Initial Presentation: 56 y o  male with PMH of heart failure preserved ejection fraction, DM2 and moderate,persistent asthma who presents to the ED from home with increasing shortness of breath over the last 1-1/2 weeks  Also notable some PND and edema with at least 4 lb weight gain  Was seen by Cardiology on outpatient basis yesterday and received IV Lasix 100 mg  His Bumex was increased to 3 mg b i d  For 1 mg b i d  Yesterday with no significant improvement or urine output:    PE: Normal breath sounds, trace to 1+ B/L LE edema to mid calf  7/29 Plan: Inpatient admission for evaluation and treatment of acute on chronic CHF:  Continue IV Bumex  7/29 Cardiology consult:  Acute on Chronic HFpEF- HFimpeEF, Stage C, NYHA III   Volume up on exam today,   recent CardioMems PAD 18-19mmHg, now up to mid 20's   Recent increase to Bumex 3 mg BID w/ K 40 meq BID  Weight:    314 lbs  NT proBNP: 7/29/22: 903  609 4/8/22  Plan: Diurese with Bumix 3 mg IV BID, 1 8 L fluid restriction, continue AC and beta blocker for Afib  PE: Mild JVD, diminished air entry B/L,  Trace pitting edema B/L LE      7/30 Cardiology:  Continue  Bumex 3 mg IV BID  today  1 8 liter fluid restriction  Pt with poor air entry bilaterally so likely some pulmonary contribution of his TELLES  AC and beta blocker on board for afib    Start amlodipine 5 mg daily- avoiding ACE / ARB with renal function  Internal Medicine: On room air, has nasal congestion and productive cough  Patient reports feeling short of breath with ambulation, no chest pain, does have occasional palpitations   Continue IV Bumex and trend renal function    PE: Lungs diminished, slight crackles B/L        ED Triage Vitals   Temperature Pulse Respirations Blood Pressure SpO2   07/29/22 1249 07/29/22 1249 07/29/22 1249 07/29/22 1249 07/29/22 1249   97 8 °F (36 6 °C) 80 18 120/67 96 %      Temp Source Heart Rate Source Patient Position - Orthostatic VS BP Location FiO2 (%)   07/29/22 1249 07/29/22 1249 07/29/22 1810 07/29/22 1810 --   Oral Monitor Sitting Right arm       Pain Score       07/29/22 1810       No Pain          Wt Readings from Last 1 Encounters:   07/30/22 (!) 141 kg (310 lb 10 1 oz)     Additional Vital Signs:       Date/Time Temp Pulse Resp BP MAP (mmHg) SpO2 O2 Device   07/30/22 15:22:15 98 2 °F (36 8 °C) 67 17 142/89 107 95 % --   07/30/22 11:07:14 97 9 °F (36 6 °C) 72 -- 129/82 98 96 % None (Room air)   07/30/22 07:14:25 98 °F (36 7 °C) 73 -- 145/79 101 93 % --   07/30/22 07:13:37 98 °F (36 7 °C) -- -- 145/79 101 -- --   07/30/22 0709 -- -- -- -- -- -- None (Room air)   07/30/22 03:07:04 97 9 °F (36 6 °C) 72 18 142/79 100 95 % None (Room air)   07/29/22 22:24:33 98 4 °F (36 9 °C) -- 20 157/81 106 -- --   07/29/22 18:33:34 98 °F (36 7 °C) 82 18 150/81 104 98 % --   07/29/22 18:31:24 98 °F (36 7 °C) -- 18 150/81 104 -- --   07/29/22 1810 -- -- -- -- -- -- None (Room air)   07/29/22 1800 -- 86 22 151/81 108 94 % --   07/29/22 1715 -- 80 19 -- -- 93 % --   07/29/22 1552 -- 76 21 120/64 86 95 % --   07/29/22 1500 -- 74 20 124/74 95 95 % None (Room air)   07/29/22 1430 -- 70 19 110/69 84 96 % --   07/29/22 1400 -- 72 22 118/63 84 95 % --   07/29/22 1249 97 8 °F (36 6 °C) 80 18 120/67 -- 96 % None (Room air)         Pertinent Labs/Diagnostic Test Results:   XR chest 1 view portable   Final Result by Hayder Gomez MD (07/29 1336)      No acute cardiopulmonary disease        Findings are stable           Results from last 7 days   Lab Units 07/30/22  0535 07/29/22  1322   WBC Thousand/uL 8 16 9 60   HEMOGLOBIN g/dL 11 7* 12 4   HEMATOCRIT % 37 2 38 2   PLATELETS Thousands/uL 259 324   NEUTROS ABS Thousands/µL  --  5 94         Results from last 7 days   Lab Units 07/30/22  0535 07/29/22  1322 07/27/22  1115   SODIUM mmol/L 137 136 138   POTASSIUM mmol/L 4 1 3 9 4 0   CHLORIDE mmol/L 103 101 105   CO2 mmol/L 32 28 30   ANION GAP mmol/L 2* 7 3*   BUN mg/dL 30* 26* 23   CREATININE mg/dL 1 70* 1 56* 1 75*   EGFR ml/min/1 73sq m 44 49 42   CALCIUM mg/dL 8 6 8 9 8 9   MAGNESIUM mg/dL 2 1  --   --      Results from last 7 days   Lab Units 07/29/22  1322   AST U/L 11   ALT U/L 13   ALK PHOS U/L 91   TOTAL PROTEIN g/dL 7 2   ALBUMIN g/dL 3 2*   TOTAL BILIRUBIN mg/dL 0 42     Results from last 7 days   Lab Units 07/30/22  1624 07/30/22  1106 07/30/22  0613 07/29/22  2048   POC GLUCOSE mg/dl 145* 150* 139 134     Results from last 7 days   Lab Units 07/30/22  0535 07/29/22  1322 07/27/22  1115   GLUCOSE RANDOM mg/dL 129 139 105           Results from last 7 days   Lab Units 07/29/22  1752 07/29/22  1550 07/29/22  1322   HS TNI 0HR ng/L  --   --  13   HS TNI 2HR ng/L  --  12  --    HSTNI D2 ng/L  --  -1  --    HS TNI 4HR ng/L 12  --   -- HSTNI D4 ng/L -1  --   --                Results from last 7 days   Lab Units 07/29/22  1322   NT-PRO BNP pg/mL 903*               ED Treatment:   Medication Administration from 07/29/2022 1234 to 07/29/2022 1810       Date/Time Order Dose Route Action     07/29/2022 1321 furosemide (LASIX) injection 40 mg 40 mg Intravenous Given     07/29/2022 1547 bumetanide (BUMEX) injection 3 mg 3 mg Intravenous Given     07/29/2022 1758 albuterol (PROVENTIL HFA,VENTOLIN HFA) inhaler 2 puff 2 puff Inhalation Given     07/29/2022 1653 sitaGLIPtin (JANUVIA) tablet 100 mg 100 mg Oral Given     07/29/2022 1653 umeclidinium bromide (INCRUSE ELLIPTA) 62 5 mcg/inh inhaler AEPB 1 puff 1 puff Inhalation Given        Past Medical History:   Diagnosis Date    Acute gastritis without hemorrhage     last assessed 3/24/17    Asthma     Diabetes mellitus ([place] [address] )     Gastric bypass status for obesity     Hyperlipidemia     Hypertension     Impaired fasting glucose     last assessed 5/10/17    Obesity     Viral gastroenteritis     last assessed 11/4/16     Present on Admission:   Acute on chronic diastolic congestive heart failure (HCC)   Type 2 diabetes mellitus with hyperglycemia (HCC)   Essential hypertension   Morbid obesity (HCC)   Moderate persistent asthma   Stage 3a chronic kidney disease ([place] [address] )      Admitting Diagnosis: Respiratory distress [R06 03]  SOB (shortness of breath) [L09 62]  Chronic diastolic congestive heart failure (HCC) [I50 32]  Acute on chronic diastolic congestive heart failure ([place] [address] ) [I50 33]  Age/Sex: 56 y o  male       Admission Orders: Telemetry, daily weight, I/O          Scheduled Medications:    amLODIPine, 5 mg, Oral, Daily  apixaban, 5 mg, Oral, BID  atorvastatin, 10 mg, Oral, Daily  benzonatate, 100 mg, Oral, TID  budesonide-formoterol, 2 puff, Inhalation, BID  bumetanide, 3 mg, Intravenous, BID (diuretic)  insulin lispro, 1-5 Units, Subcutaneous, TID AC  insulin lispro, 1-5 Units, Subcutaneous, HS  loratadine, 10 mg, Oral, Daily  metoprolol succinate, 50 mg, Oral, Daily  montelukast, 10 mg, Oral, HS  potassium chloride, 40 mEq, Oral, BID  sitaGLIPtin, 100 mg, Oral, Daily  umeclidinium bromide, 1 puff, Inhalation, Daily      Continuous IV Infusions: None  PRN Meds:      acetaminophen, 650 mg, Oral, Q6H PRN  albuterol, 2 puff, Inhalation, Q4H PRN x 1 dose 7/30  aluminum-magnesium hydroxide-simethicone, 30 mL, Oral, Q6H PRN  ondansetron, 4 mg, Intravenous, Q6H PRN  polyethylene glycol, 17 g, Oral, Daily PRN        IP CONSULT TO NUTRITION SERVICES  IP CONSULT TO CARDIOLOGY    Network Utilization Review Department  ATTENTION: Please call with any questions or concerns to [phone] and carefully listen to the prompts so that you are directed to the right person  All voicemails are confidential   Adriana Harris all requests for admission clinical reviews, approved or denied determinations and any other requests to dedicated fax number below belonging to the campus where the patient is receiving treatment   List of dedicated fax numbers for the Facilities:  [address] DENIALS (Administrative/Medical Necessity) [phone]   [address] (Maternity/NICU/Pediatrics) [phone]   [address]  [address] [facility] [place] [address] [address] [place] [address] [address] [facility] [place] [address] [phone]

## 2022-07-30 NOTE — NUTRITION
07/30/22 1937   Recommendations/Interventions   Recommendations to Provider Suggest adding 2gm Sodium to current diet order

## 2022-07-30 NOTE — ASSESSMENT & PLAN NOTE
Wt Readings from Last 3 Encounters:   07/30/22 (!) 141 kg (310 lb 10 1 oz)   07/28/22 (!) 143 kg (314 lb 12 8 oz)   06/24/22 (!) 142 kg (312 lb 1 6 oz)       · Patient with increased shortness of breath for a week and half  · Positive lower extremity edema  · Patient seen by cardiology specialist in the ED started on Bumex 3 mg IV b i d    · Strict /I/os/ Daily weights   · Fluid restriction 1 8 liter  · Suspect multifactorial dt pt hx Asthma  · Continue bb per HF but started on norvasc no ace dt renal function

## 2022-07-31 LAB
ANION GAP SERPL CALCULATED.3IONS-SCNC: 6 MMOL/L (ref 4–13)
BUN SERPL-MCNC: 28 MG/DL (ref 5–25)
CALCIUM SERPL-MCNC: 9.4 MG/DL (ref 8.3–10.1)
CHLORIDE SERPL-SCNC: 102 MMOL/L (ref 96–108)
CO2 SERPL-SCNC: 29 MMOL/L (ref 21–32)
CREAT SERPL-MCNC: 1.94 MG/DL (ref 0.6–1.3)
GFR SERPL CREATININE-BSD FRML MDRD: 37 ML/MIN/1.73SQ M
GLUCOSE SERPL-MCNC: 125 MG/DL (ref 65–140)
GLUCOSE SERPL-MCNC: 131 MG/DL (ref 65–140)
GLUCOSE SERPL-MCNC: 143 MG/DL (ref 65–140)
GLUCOSE SERPL-MCNC: 165 MG/DL (ref 65–140)
GLUCOSE SERPL-MCNC: 169 MG/DL (ref 65–140)
POTASSIUM SERPL-SCNC: 4.5 MMOL/L (ref 3.5–5.3)
SODIUM SERPL-SCNC: 137 MMOL/L (ref 135–147)

## 2022-07-31 PROCEDURE — 82948 REAGENT STRIP/BLOOD GLUCOSE: CPT

## 2022-07-31 PROCEDURE — 94760 N-INVAS EAR/PLS OXIMETRY 1: CPT

## 2022-07-31 PROCEDURE — 99232 SBSQ HOSP IP/OBS MODERATE 35: CPT | Performed by: NURSE PRACTITIONER

## 2022-07-31 PROCEDURE — 94640 AIRWAY INHALATION TREATMENT: CPT

## 2022-07-31 PROCEDURE — 99232 SBSQ HOSP IP/OBS MODERATE 35: CPT | Performed by: INTERNAL MEDICINE

## 2022-07-31 PROCEDURE — 80048 BASIC METABOLIC PNL TOTAL CA: CPT | Performed by: NURSE PRACTITIONER

## 2022-07-31 RX ORDER — LANOLIN ALCOHOL/MO/W.PET/CERES
3 CREAM (GRAM) TOPICAL
Status: DISCONTINUED | OUTPATIENT
Start: 2022-07-31 | End: 2022-08-03 | Stop reason: HOSPADM

## 2022-07-31 RX ORDER — LEVALBUTEROL 1.25 MG/.5ML
1.25 SOLUTION, CONCENTRATE RESPIRATORY (INHALATION)
Status: DISCONTINUED | OUTPATIENT
Start: 2022-07-31 | End: 2022-08-03 | Stop reason: HOSPADM

## 2022-07-31 RX ORDER — SODIUM CHLORIDE FOR INHALATION 0.9 %
3 VIAL, NEBULIZER (ML) INHALATION
Status: DISCONTINUED | OUTPATIENT
Start: 2022-07-31 | End: 2022-08-03 | Stop reason: HOSPADM

## 2022-07-31 RX ADMIN — INSULIN LISPRO 1 UNITS: 100 INJECTION, SOLUTION INTRAVENOUS; SUBCUTANEOUS at 21:03

## 2022-07-31 RX ADMIN — LEVALBUTEROL HYDROCHLORIDE 1.25 MG: 1.25 SOLUTION, CONCENTRATE RESPIRATORY (INHALATION) at 13:42

## 2022-07-31 RX ADMIN — BUDESONIDE AND FORMOTEROL FUMARATE DIHYDRATE 2 PUFF: 160; 4.5 AEROSOL RESPIRATORY (INHALATION) at 17:00

## 2022-07-31 RX ADMIN — ATORVASTATIN CALCIUM 10 MG: 10 TABLET, FILM COATED ORAL at 08:01

## 2022-07-31 RX ADMIN — POTASSIUM CHLORIDE 40 MEQ: 1500 TABLET, EXTENDED RELEASE ORAL at 17:00

## 2022-07-31 RX ADMIN — Medication 3 MG: at 21:01

## 2022-07-31 RX ADMIN — BENZONATATE 100 MG: 100 CAPSULE ORAL at 20:58

## 2022-07-31 RX ADMIN — AMLODIPINE BESYLATE 5 MG: 5 TABLET ORAL at 08:01

## 2022-07-31 RX ADMIN — ISODIUM CHLORIDE 3 ML: 0.03 SOLUTION RESPIRATORY (INHALATION) at 19:04

## 2022-07-31 RX ADMIN — BUMETANIDE 3 MG: 0.25 INJECTION INTRAMUSCULAR; INTRAVENOUS at 08:01

## 2022-07-31 RX ADMIN — BENZONATATE 100 MG: 100 CAPSULE ORAL at 08:01

## 2022-07-31 RX ADMIN — ALBUTEROL SULFATE 2 PUFF: 90 AEROSOL, METERED RESPIRATORY (INHALATION) at 08:00

## 2022-07-31 RX ADMIN — MONTELUKAST SODIUM 10 MG: 10 TABLET, FILM COATED ORAL at 21:01

## 2022-07-31 RX ADMIN — METOPROLOL SUCCINATE 50 MG: 50 TABLET, EXTENDED RELEASE ORAL at 08:02

## 2022-07-31 RX ADMIN — BUDESONIDE AND FORMOTEROL FUMARATE DIHYDRATE 2 PUFF: 160; 4.5 AEROSOL RESPIRATORY (INHALATION) at 08:00

## 2022-07-31 RX ADMIN — LORATADINE 10 MG: 10 TABLET ORAL at 08:02

## 2022-07-31 RX ADMIN — POTASSIUM CHLORIDE 40 MEQ: 1500 TABLET, EXTENDED RELEASE ORAL at 08:01

## 2022-07-31 RX ADMIN — LEVALBUTEROL HYDROCHLORIDE 1.25 MG: 1.25 SOLUTION, CONCENTRATE RESPIRATORY (INHALATION) at 19:04

## 2022-07-31 RX ADMIN — APIXABAN 5 MG: 5 TABLET, FILM COATED ORAL at 08:02

## 2022-07-31 RX ADMIN — UMECLIDINIUM 1 PUFF: 62.5 AEROSOL, POWDER ORAL at 08:00

## 2022-07-31 RX ADMIN — ISODIUM CHLORIDE 3 ML: 0.03 SOLUTION RESPIRATORY (INHALATION) at 13:42

## 2022-07-31 RX ADMIN — BENZONATATE 100 MG: 100 CAPSULE ORAL at 16:42

## 2022-07-31 RX ADMIN — SITAGLIPTIN 100 MG: 100 TABLET, FILM COATED ORAL at 08:03

## 2022-07-31 RX ADMIN — APIXABAN 5 MG: 5 TABLET, FILM COATED ORAL at 17:00

## 2022-07-31 RX ADMIN — BUMETANIDE 3 MG: 0.25 INJECTION INTRAMUSCULAR; INTRAVENOUS at 16:34

## 2022-07-31 NOTE — ASSESSMENT & PLAN NOTE
Some mild nasal congestion - possibly contributing to productive cough   · Will order Claritin for allergies   · Question environmental allergies vs viral   · Will add resp protocol pt requesting maybe one tx a day   · Order tessalon pearls for cough TID  , this is helping cough   · Would recommend flutter valve

## 2022-07-31 NOTE — PROGRESS NOTES
Heart Failure/ Pulmonary Hypertension Progress Note - Mancil Day 56 y o  male MRN: [id number]    Unit/Bed#: [facility] 510-01 Encounter: [id number]      Assessment:    Principal Problem:    Acute on chronic diastolic congestive heart failure (HCC)  Active Problems:    Essential hypertension    Type 2 diabetes mellitus with hyperglycemia (HCC)    Morbid obesity (HCC)    Moderate persistent asthma    Stage 3a chronic kidney disease (HCC)    URI (upper respiratory infection)        Objective: Intake/ Output:  900/2280: -1380 w/ bumex 3 mg IV BID  Weight: 308 lbs  Tele:     Cr 1 7  MAPs 80's to 100    # Acute on Chronic HFpEF- HFimpeEF, Stage C, NYHA III     Volume up on exam today, recent CardioMems PAD 18-19mmHg, now up to mid 20's   Diuretic: recent increase to Bumex 3 mg BID w/ K 40 meq BID   Weight:  314 lbs   NT proBNP: 7/29/22: 903   609 4/8/22        [facility]:    RA 4    RV 35/4    PA 35/12/21    PCWP 10    PA sat 64%    Travis CO 5 56 L/min    Travis CI 2 2L/min/m2    PVR 1 97 SAGASTUME        TTE 11/12/2021:   LVEF 55%  LVIDd 6 0 cm  Grade 1 DD  Normal RV  Trace TR  TTE 07/19/2021:   LVEF 50%  LVIDd 6 13 cm  Grade 1 DD  Normal RV  Trace MR and TR  Mildly dilated IVC      TTE 03/25/2020:   LVEF 40-45%  LVIDd 6 12 cm  Normal RV           Neurohormonal Blockade:    --Beta Blocker: metoprolol succinate 50 mg daily      --ARNi / ACEi / ARB: No      --Aldosterone Antagonist: No      --SGLT2 Inhibitor: No     --Diuretic: bumex 2mg in AM and 1mg in PM, potassium 20 meq BID   - recently uptitrated and   now up to bumex 2mg TID and potassium 60 meq total daily since yesterday   Inpt: Bumex 3 mg IV BID      Sudden Cardiac Death Risk Reduction:    --LVEF >35%           Electrical Resynchronization:    --Candidacy for BiV device: narrow QRS           Advanced Therapies:  Will continue to monitor   LVEF recovered        # Atrial fibrillation    [id number] = 3 (HF, HTN, DM)      Diagnosed 02/2022     Anticoagulation on Eliquis      Rate control: metoprolol 50 mg daily  Rhythm control: No         # Hypertension, mostly controlled, amlodipine 5 mg added      # Hyperlipidemia   Most recent lipid panel from 10/01/2021: cholesterol 157; TG 90;   HDL 66; calculated LDL 73     Rx: atorvastatin 10 mg daily            # Diabetes mellitus, type II      Non-insulin dependent     HbA1c 6/16/22: 5 7%        # Obstructive sleep apnea   # Chronic respiratory failure    # Asthma, moderate persistent     # S/p gastric bypass (Samuel-en-Y)surgery     # History of tobacco abuse    # Carpal tunnel syndrome, bilateral     # CKD, Cr 1 56 - 1 7 today  # s/p CardioMems implant 3/9/22    PAD 25 mmHg- most recently - which is up for him       TODAY'S PLAN:   Diurese with Bumex 3 mg IV BID - continue today  1 8 liter fluid restriction   Pt with poor air entry bilaterally so likely some pulmonary contribution of his TELLES   AC and beta blocker on board for afib   Recommend SGLT2i as outpt  Start amlodipine 5 mg daily- avoiding ACE / ARB with renal function    Review of Systems   Constitutional: Negative for activity change, appetite change, fatigue and unexpected weight change  HENT: Negative for congestion and nosebleeds  Eyes: Negative  Respiratory: Positive for shortness of breath  Negative for cough and chest tightness  Cardiovascular: Positive for leg swelling  Negative for chest pain and palpitations  Gastrointestinal: Negative for abdominal distention  Endocrine: Negative  Genitourinary: Negative  Musculoskeletal: Negative  Skin: Negative  Neurological: Negative for dizziness, syncope and weakness  Hematological: Negative  Psychiatric/Behavioral: Negative  [employer] (day, reason): Cotton catheter (day, reason):    Vitals: Blood pressure 144/89, pulse 90, temperature 97 8 °F (36 6 °C), temperature source Oral, resp   rate 17, height 6' 1" (1 854 m), weight (!) 140 kg (308 lb 3 3 oz), SpO2 97 %  , Body mass index is 40 66 kg/m² , I/O last 3 completed shifts: In: 900 [P O :900]  Out: 2580 [Urine:2580]  I/O this shift:  In: 118 [P O :118]  Out: 900 [Urine:900]  Wt Readings from Last 3 Encounters:   07/31/22 (!) 140 kg (308 lb 3 3 oz)   07/28/22 (!) 143 kg (314 lb 12 8 oz)   06/24/22 (!) 142 kg (312 lb 1 6 oz)       Intake/Output Summary (Last 24 hours) at 7/31/2022 1002  Last data filed at 7/31/2022 0831  Gross per 24 hour   Intake 838 ml   Output 2355 ml   Net -1517 ml     I/O last 3 completed shifts: In: 900 [P O :900]  Out: 2580 [Urine:2580]    No significant arrhythmias seen on telemetry review         Physical Exam:  Vitals:    07/31/22 0225 07/31/22 0544 07/31/22 0721 07/31/22 0801   BP: 131/66  144/89    BP Location: Right arm      Pulse: 72  69 90   Resp: 17      Temp: 97 5 °F (36 4 °C)  97 8 °F (36 6 °C)    TempSrc: Oral  Oral    SpO2: 97%  97%    Weight:  (!) 140 kg (308 lb 3 3 oz)     Height:           GEN: Andrea Jackson appears well, alert and oriented x 3, pleasant and cooperative   HEENT: pupils equal, round, and reactive to light; extraocular muscles intact  NECK: supple, no carotid bruits   HEART: regular rhythm, normal S1 and S2, no murmurs, clicks, gallops or rubs, JVP is difficult to assess  LUNGS: clear to auscultation bilaterally; no wheezes, rales, or rhonchi   ABDOMEN: normal bowel sounds, soft, no tenderness, no distention  EXTREMITIES: peripheral pulses normal; no clubbing, cyanosis, positive edema  NEURO: no focal findings   SKIN: normal without suspicious lesions on exposed skin      Current Facility-Administered Medications:     acetaminophen (TYLENOL) tablet 650 mg, 650 mg, Oral, Q6H PRN, Radha Pablo MD    albuterol (PROVENTIL HFA,VENTOLIN HFA) inhaler 2 puff, 2 puff, Inhalation, Q4H PRN, Radha Pablo MD, 2 puff at 07/31/22 0800    aluminum-magnesium hydroxide-simethicone (MYLANTA) oral suspension 30 mL, 30 mL, Oral, Q6H PRN, MD Hector Reveles amLODIPine (NORVASC) tablet 5 mg, 5 mg, Oral, Daily, Jenniffer Grant, , 5 mg at 07/31/22 0801    apixaban (ELIQUIS) tablet 5 mg, 5 mg, Oral, BID, Sherlyn Shabazz MD, 5 mg at 07/31/22 0802    atorvastatin (LIPITOR) tablet 10 mg, 10 mg, Oral, Daily, Sherlyn Shabazz MD, 10 mg at 07/31/22 0801    benzonatate (TESSALON PERLES) capsule 100 mg, 100 mg, Oral, TID, ASM VasquezNP, 100 mg at 07/31/22 0801    budesonide-formoterol (SYMBICORT) 160-4 5 mcg/act inhaler 2 puff, 2 puff, Inhalation, BID, Sherlyn Shabazz MD, 2 puff at 07/31/22 0800    bumetanide (BUMEX) injection 3 mg, 3 mg, Intravenous, BID (diuretic), Sherlyn Shabazz MD, 3 mg at 07/31/22 0801    insulin lispro (HumaLOG) 100 units/mL subcutaneous injection 1-5 Units, 1-5 Units, Subcutaneous, TID AC, 1 Units at 07/30/22 1128 **AND** Fingerstick Glucose (POCT), , , TID AC, Sherlyn Shabazz MD    insulin lispro (HumaLOG) 100 units/mL subcutaneous injection 1-5 Units, 1-5 Units, Subcutaneous, HS, Sherlyn Shabazz MD, 1 Units at 07/30/22 2146    loratadine (CLARITIN) tablet 10 mg, 10 mg, Oral, Daily, RODRIGUE Vasquez, 10 mg at 07/31/22 0802    metoprolol succinate (TOPROL-XL) 24 hr tablet 50 mg, 50 mg, Oral, Daily, Sherlyn Shabazz MD, 50 mg at 07/31/22 0802    montelukast (SINGULAIR) tablet 10 mg, 10 mg, Oral, HS, Sherlyn Shabazz MD, 10 mg at 07/30/22 2147    ondansetron (ZOFRAN) injection 4 mg, 4 mg, Intravenous, Q6H PRN, Sherlyn Shabazz MD    polyethylene glycol (MIRALAX) packet 17 g, 17 g, Oral, Daily PRN, Sherlyn Shabazz MD    potassium chloride (K-DUR,KLOR-CON) CR tablet 40 mEq, 40 mEq, Oral, BID, Sherlyn Shabazz MD, 40 mEq at 07/31/22 0801    sitaGLIPtin (JANUVIA) tablet 100 mg, 100 mg, Oral, Daily, Sherlyn Shabazz MD, 100 mg at 07/31/22 0803    umeclidinium bromide (INCRUSE ELLIPTA) 62 5 mcg/inh inhaler AEPB 1 puff, 1 puff, Inhalation, Daily, Sherlyn Shabazz MD, 1 puff at 07/31/22 0800      Labs & Results:        Results from last 7 days   Lab Units 07/30/22  0535 07/29/22  1322   WBC Thousand/uL 8 16 9 60   HEMOGLOBIN g/dL 11 7* 12 4   HEMATOCRIT % 37 2 38 2   PLATELETS Thousands/uL 259 324         Results from last 7 days   Lab Units 07/30/22  0535 07/29/22  1322 07/27/22  1115   POTASSIUM mmol/L 4 1 3 9 4 0   CHLORIDE mmol/L 103 101 105   CO2 mmol/L 32 28 30   BUN mg/dL 30* 26* 23   CREATININE mg/dL 1 70* 1 56* 1 75*   CALCIUM mg/dL 8 6 8 9 8 9   ALK PHOS U/L  --  91  --    ALT U/L  --  13  --    AST U/L  --  11  --            Counseling / Coordination of Care  Total floor / unit time spent today 25 minutes  Greater than 50% of total time was spent with the patient and / or family counseling and / or coordination of care  A description of the counseling / coordination of care: 15  Thank you for the opportunity to participate in the care of this patient    [facility] PULMONARY HYPERTENSION  MEDICAL DIRECTOR OF [place]

## 2022-07-31 NOTE — RESPIRATORY THERAPY NOTE
RT Protocol Note  Virl Blood 56 y o  male MRN: [id number]  Unit/Bed#: [facility] 510-01 Encounter: [id number]    Assessment    Principal Problem:    Acute on chronic diastolic congestive heart failure (HCC)  Active Problems:    Essential hypertension    Type 2 diabetes mellitus with hyperglycemia (HCC)    Morbid obesity (HCC)    Moderate persistent asthma    Stage 3a chronic kidney disease (HCC)    URI (upper respiratory infection)      Home Pulmonary Medications:  Symbicort, Spiriva, Xopenex, Albuterol       Past Medical History:   Diagnosis Date    Acute gastritis without hemorrhage     last assessed 3/24/17    Asthma     Diabetes mellitus ([facility] [address] )     Gastric bypass status for obesity     Hyperlipidemia     Hypertension     Impaired fasting glucose     last assessed 5/10/17    Obesity     Viral gastroenteritis     last assessed 16     Social History     Socioeconomic History    Marital status: /Civil Union     Spouse name: None    Number of children: None    Years of education: None    Highest education level: None   Occupational History    None   Tobacco Use    Smoking status: Former Smoker     Packs/day: 0 01     Years: 11 00     Pack years: 0 11     Types: Pipe, Cigars     Start date:      Quit date:      Years since quittin 5    Smokeless tobacco: Never Used    Tobacco comment: cigar once a day   Vaping Use    Vaping Use: Never used   Substance and Sexual Activity    Alcohol use: Not Currently     Alcohol/week: 2 0 standard drinks     Types: 2 Shots of liquor per week     Comment: social    Drug use: No    Sexual activity: Yes     Partners: Female     Birth control/protection: None   Other Topics Concern    None   Social History Narrative    None     Social Determinants of Health     Financial Resource Strain: Not on file   Food Insecurity: No Food Insecurity    Worried About Running Out of Food in the Last Year: Never true    Ran Out of Food in the Last Year: Never true   Transportation Needs: No Transportation Needs    Lack of Transportation (Medical): No    Lack of Transportation (Non-Medical): No   Physical Activity: Not on file   Stress: Not on file   Social Connections: Not on file   Intimate Partner Violence: Not on file   Housing Stability: Unknown    Unable to Pay for Housing in the Last Year: No    Number of Places Lived in the Last Year: Not on file    Unstable Housing in the Last Year: No       Subjective         Objective    Physical Exam:   Assessment Type: Assess only  General Appearance: Alert, Awake  Respiratory Pattern: Dyspnea with exertion  Chest Assessment: Chest expansion symmetrical  Bilateral Breath Sounds: Clear  Cough: (P) Non-productive, Dry  O2 Device: ra    Vitals:  Blood pressure 129/87, pulse (P) 77, temperature 98 °F (36 7 °C), resp  rate (P) 18, height 6' 1" (1 854 m), weight (!) 140 kg (308 lb 3 3 oz), SpO2 (P) 98 %  Imaging and other studies: I have personally reviewed pertinent reports  O2 Device: ra     Plan    Respiratory Plan: Mild Distress pathway        Resp Comments: (P) Pt admitted for CHF exacerbation 7/29  Hx CHF, moderate asthma (last exacerbation 9/21), VICKY (currently not using CPAP due to kelley recall)  At home pt takes Symbicort, Spiriva, Xopenex daily  Also takes prn albuterol inhaler  Pt required prn albuterol mdi twice this morning and asked to have xopenex udn tx prn  Assessed clear bs bilaterally, spo2 98% on ra  Although pt's dyspnea is most likely related to CHF, per request of pt begin udn tx of xopenex and follow resp protocol

## 2022-07-31 NOTE — ASSESSMENT & PLAN NOTE
Lab Results   Component Value Date    HGBA1C 5 7 06/16/2022       Recent Labs     07/30/22  1106 07/30/22  1624 07/30/22  2118 07/31/22  0634   POCGLU 150* 145* 158* 125       Blood Sugar Average: Last 72 hrs:  · (P) 318 [id number] continue outpatient newly  · Fingerstick sliding scale coverage

## 2022-07-31 NOTE — ASSESSMENT & PLAN NOTE
Wt Readings from Last 3 Encounters:   07/31/22 (!) 140 kg (308 lb 3 3 oz)   07/28/22 (!) 143 kg (314 lb 12 8 oz)   06/24/22 (!) 142 kg (312 lb 1 6 oz)       · Patient with increased shortness of breath for a week and half  · Positive lower extremity edema, improving   · Patient seen by cardiology in the ED started on Bumex 3 mg IV b i d, to continue today   · Strict /I/os/ Daily weights   · Fluid restriction 1 8 liter  · Suspect multifactorial dt pt hx Asthma  · Continue bb per HF but started on norvasc no ace dt renal function

## 2022-07-31 NOTE — PROGRESS NOTES
[address] [facility]  Progress Note - Drema Root 1965, 56 y o  male MRN: [id number]  Unit/Bed#: [facility] 510-01 Encounter: [id number]  Primary Care Provider: Lizzie Britton MD   Date and time admitted to hospital: 7/29/2022 12:35 PM    * Acute on chronic diastolic congestive heart failure [facility])  Assessment & Plan  Wt Readings from Last 3 Encounters:   07/31/22 (!) 140 kg (308 lb 3 3 oz)   07/28/22 (!) 143 kg (314 lb 12 8 oz)   06/24/22 (!) 142 kg (312 lb 1 6 oz)       · Patient with increased shortness of breath for a week and half  · Positive lower extremity edema, improving   · Patient seen by cardiology in the ED started on Bumex 3 mg IV b i d, to continue today   · Strict /I/os/ Daily weights   · Fluid restriction 1 8 liter  · Suspect multifactorial dt pt hx Asthma  · Continue bb per HF but started on norvasc no ace dt renal function       Moderate persistent asthma  Assessment & Plan  · Patient with history of moderate persistent asthma  · No wheezing on exam has patient will also with lower extremity edema weight gain  · Do not feel this is an asthma exacerbation by represents an exacerbation of congestive heart failure    URI (upper respiratory infection)  Assessment & Plan  Some mild nasal congestion - possibly contributing to productive cough   · Will order Claritin for allergies   · Question environmental allergies vs viral   · Will add resp protocol pt requesting maybe one tx a day   · Order tessalon pearls for cough TID  , this is helping cough   · Would recommend flutter valve     Stage 3a chronic kidney disease [facility])  Assessment & Plan  Lab Results   Component Value Date    EGFR 44 07/30/2022    EGFR 49 07/29/2022    EGFR 42 07/27/2022    CREATININE 1 70 (H) 07/30/2022    CREATININE 1 56 (H) 07/29/2022    CREATININE 1 75 (H) 07/27/2022   · B/L 1 6 - 1 70   · strict I/so daily weights  · Avoid nephrotoxins   · Avoid hypotension   · Continue iv bumex and trend renal function   ·     Morbid obesity ([facility] [address] )  Assessment & Plan  · Morbid obesity noted  · Dietary maneuvers discussed    Type 2 diabetes mellitus with hyperglycemia [facility])  Assessment & Plan  Lab Results   Component Value Date    HGBA1C 5 7 2022       Recent Labs     22  1106 22  1624 22  2118 22  0634   POCGLU 150* 145* 158* 125       Blood Sugar Average: Last 72 hrs:  · (P) 274 [id number] continue outpatient newly  · Fingerstick sliding scale coverage    Essential hypertension  Assessment & Plan  · Continue Toprol XL 50 mg daily        VTE Pharmacologic Prophylaxis:   High Risk (Score >/= 5) - Pharmacological DVT Prophylaxis Ordered: apixaban (Eliquis)  Sequential Compression Devices Ordered  Patient Centered Rounds: I performed bedside rounds with nursing staff today  Discussions with Specialists or Other Care Team Provider: nursing     Education and Discussions with Family / Patient: Patient declined call to   Time Spent for Care: 45 minutes  More than 50% of total time spent on counseling and coordination of care as described above  Current Length of Stay: 2 day(s)  Current Patient Status: Inpatient   Certification Statement: The patient will continue to require additional inpatient hospital stay due to ongoing iv diuresis   Discharge Plan: Anticipate discharge in 24-48 hrs to home  Code Status: Level 1 - Full Code    Subjective:   Pt doing well although he states overnight he had two episodes where he woke up out of a sleep feeling like he could not breath  He states that he cannot lay flat  He does feel that his cough is a little more controlled and the nasal drip little better       Objective:     Vitals:   Temp (24hrs), Av 9 °F (36 6 °C), Min:97 5 °F (36 4 °C), Max:98 3 °F (36 8 °C)    Temp:  [97 5 °F (36 4 °C)-98 3 °F (36 8 °C)] 98 °F (36 7 °C)  HR:  [65-90] 75  Resp:  [16-18] 17  BP: (129-147)/(66-89) 129/87  SpO2:  [95 %-98 %] 98 %  Body mass index is 40 66 kg/m²  Input and Output Summary (last 24 hours): Intake/Output Summary (Last 24 hours) at 7/31/2022 1146  Last data filed at 7/31/2022 1120  Gross per 24 hour   Intake 838 ml   Output 2630 ml   Net -1792 ml       Physical Exam:   Physical Exam  Constitutional:       General: He is not in acute distress  Appearance: He is obese  He is not ill-appearing, toxic-appearing or diaphoretic  HENT:      Head: Normocephalic  Eyes:      General:         Right eye: No discharge  Left eye: No discharge  Conjunctiva/sclera: Conjunctivae normal    Cardiovascular:      Rate and Rhythm: Normal rate  Heart sounds: No murmur heard  No friction rub  No gallop  Pulmonary:      Effort: No respiratory distress  Breath sounds: No stridor  Rales present  No wheezing or rhonchi  Comments: Moist cough triggered by deep inspiration   Chest:      Chest wall: No tenderness  Abdominal:      General: There is no distension  Palpations: There is no mass  Tenderness: There is no abdominal tenderness  There is no guarding or rebound  Hernia: No hernia is present  Musculoskeletal:         General: Swelling present  No tenderness, deformity or signs of injury  Right lower leg: No edema  Left lower leg: No edema  Skin:     Coloration: Skin is not jaundiced or pale  Findings: No bruising, erythema, lesion or rash  Neurological:      Mental Status: He is alert and oriented to person, place, and time     Psychiatric:         Behavior: Behavior normal           Additional Data:     Labs:  Results from last 7 days   Lab Units 07/30/22  0535 07/29/22  1322   WBC Thousand/uL 8 16 9 60   HEMOGLOBIN g/dL 11 7* 12 4   HEMATOCRIT % 37 2 38 2   PLATELETS Thousands/uL 259 324   NEUTROS PCT %  --  61   LYMPHS PCT %  --  17   MONOS PCT %  --  9   EOS PCT %  --  11*     Results from last 7 days   Lab Units 07/30/22  0535 07/29/22  1322   SODIUM mmol/L 137 136 POTASSIUM mmol/L 4 1 3 9   CHLORIDE mmol/L 103 101   CO2 mmol/L 32 28   BUN mg/dL 30* 26*   CREATININE mg/dL 1 70* 1 56*   ANION GAP mmol/L 2* 7   CALCIUM mg/dL 8 6 8 9   ALBUMIN g/dL  --  3 2*   TOTAL BILIRUBIN mg/dL  --  0 42   ALK PHOS U/L  --  91   ALT U/L  --  13   AST U/L  --  11   GLUCOSE RANDOM mg/dL 129 139         Results from last 7 days   Lab Units 07/31/22  0634 07/30/22  2118 07/30/22  1624 07/30/22  1106 07/30/22  0613 07/29/22  2048   POC GLUCOSE mg/dl 125 158* 145* 150* 139 134               Lines/Drains:  Invasive Devices  Report    Peripheral Intravenous Line  Duration           Peripheral IV 07/29/22 Left;Proximal;Ventral (anterior) Forearm 1 day                  Telemetry:  Telemetry Orders (From admission, onward)             48 Hour Telemetry Monitoring  Continuous x 48 hours        References:    Telemetry Guidelines   Question:  Reason for 48 Hour Telemetry  Answer:  Arrhythmias Requiring Medical Therapy (eg  SVT, Vtach/fib, Bradycardia, Uncontrolled A-fib)                 Telemetry Reviewed: Normal Sinus Rhythm  Indication for Continued Telemetry Use: No indication for continued use  Will discontinue  Imaging: No pertinent imaging reviewed      Recent Cultures (last 7 days):         Last 24 Hours Medication List:   Current Facility-Administered Medications   Medication Dose Route Frequency Provider Last Rate    acetaminophen  650 mg Oral Q6H PRN Debora Mishra MD      albuterol  2 puff Inhalation Q4H PRN Debora Mishra MD      aluminum-magnesium hydroxide-simethicone  30 mL Oral Q6H PRN Debora Mishra MD      amLODIPine  5 mg Oral Daily Luis Carlos Robbins DO      apixaban  5 mg Oral BID Debora Mishra MD      atorvastatin  10 mg Oral Daily Debora Mishra MD      benzonatate  100 mg Oral TID [employer], CRNP      budesonide-formoterol  2 puff Inhalation BID Debora Mishra MD      bumetanide  3 mg Intravenous BID (diuretic) Debora Mishra MD      insulin lispro  1-5 Units Subcutaneous TID AC González Cano MD      insulin lispro  1-5 Units Subcutaneous HS González Cano MD      loratadine  10 mg Oral Daily RODRIGUE Gaytan      metoprolol succinate  50 mg Oral Daily González Cano MD      montelukast  10 mg Oral HS González Cano MD      ondansetron  4 mg Intravenous Q6H PRN González Cano MD      polyethylene glycol  17 g Oral Daily PRN González Cano MD      potassium chloride  40 mEq Oral BID González Cano MD      sitaGLIPtin  100 mg Oral Daily González Cano MD      umeclidinium bromide  1 puff Inhalation Daily González Cano MD          Today, Patient Was Seen By: RODRIGUE Gaytan    **Please Note: This note may have been constructed using a voice recognition system  **

## 2022-08-01 ENCOUNTER — TELEPHONE (OUTPATIENT)
Dept: CARDIOLOGY CLINIC | Facility: CLINIC | Age: 57
End: 2022-08-01

## 2022-08-01 LAB
ANION GAP SERPL CALCULATED.3IONS-SCNC: 4 MMOL/L (ref 4–13)
ATRIAL RATE: 70 BPM
BUN SERPL-MCNC: 32 MG/DL (ref 5–25)
CALCIUM SERPL-MCNC: 8.7 MG/DL (ref 8.3–10.1)
CHLORIDE SERPL-SCNC: 106 MMOL/L (ref 96–108)
CO2 SERPL-SCNC: 29 MMOL/L (ref 21–32)
CREAT SERPL-MCNC: 1.88 MG/DL (ref 0.6–1.3)
GFR SERPL CREATININE-BSD FRML MDRD: 39 ML/MIN/1.73SQ M
GLUCOSE SERPL-MCNC: 109 MG/DL (ref 65–140)
GLUCOSE SERPL-MCNC: 113 MG/DL (ref 65–140)
GLUCOSE SERPL-MCNC: 126 MG/DL (ref 65–140)
GLUCOSE SERPL-MCNC: 142 MG/DL (ref 65–140)
GLUCOSE SERPL-MCNC: 172 MG/DL (ref 65–140)
P AXIS: 22 DEGREES
POTASSIUM SERPL-SCNC: 4 MMOL/L (ref 3.5–5.3)
PR INTERVAL: 176 MS
QRS AXIS: 53 DEGREES
QRSD INTERVAL: 82 MS
QT INTERVAL: 420 MS
QTC INTERVAL: 453 MS
SODIUM SERPL-SCNC: 139 MMOL/L (ref 135–147)
T WAVE AXIS: 82 DEGREES
VENTRICULAR RATE: 70 BPM

## 2022-08-01 PROCEDURE — 80048 BASIC METABOLIC PNL TOTAL CA: CPT | Performed by: NURSE PRACTITIONER

## 2022-08-01 PROCEDURE — 99232 SBSQ HOSP IP/OBS MODERATE 35: CPT | Performed by: INTERNAL MEDICINE

## 2022-08-01 PROCEDURE — 93010 ELECTROCARDIOGRAM REPORT: CPT | Performed by: INTERNAL MEDICINE

## 2022-08-01 PROCEDURE — 94640 AIRWAY INHALATION TREATMENT: CPT

## 2022-08-01 PROCEDURE — 99232 SBSQ HOSP IP/OBS MODERATE 35: CPT | Performed by: NURSE PRACTITIONER

## 2022-08-01 PROCEDURE — 82948 REAGENT STRIP/BLOOD GLUCOSE: CPT

## 2022-08-01 PROCEDURE — 94760 N-INVAS EAR/PLS OXIMETRY 1: CPT

## 2022-08-01 RX ADMIN — LEVALBUTEROL HYDROCHLORIDE 1.25 MG: 1.25 SOLUTION, CONCENTRATE RESPIRATORY (INHALATION) at 19:04

## 2022-08-01 RX ADMIN — LEVALBUTEROL HYDROCHLORIDE 1.25 MG: 1.25 SOLUTION, CONCENTRATE RESPIRATORY (INHALATION) at 07:07

## 2022-08-01 RX ADMIN — APIXABAN 5 MG: 5 TABLET, FILM COATED ORAL at 08:09

## 2022-08-01 RX ADMIN — INSULIN LISPRO 1 UNITS: 100 INJECTION, SOLUTION INTRAVENOUS; SUBCUTANEOUS at 12:05

## 2022-08-01 RX ADMIN — LORATADINE 10 MG: 10 TABLET ORAL at 08:09

## 2022-08-01 RX ADMIN — POTASSIUM CHLORIDE 40 MEQ: 1500 TABLET, EXTENDED RELEASE ORAL at 08:09

## 2022-08-01 RX ADMIN — MONTELUKAST SODIUM 10 MG: 10 TABLET, FILM COATED ORAL at 21:21

## 2022-08-01 RX ADMIN — BUDESONIDE AND FORMOTEROL FUMARATE DIHYDRATE 2 PUFF: 160; 4.5 AEROSOL RESPIRATORY (INHALATION) at 17:33

## 2022-08-01 RX ADMIN — BUMETANIDE 3 MG: 0.25 INJECTION INTRAMUSCULAR; INTRAVENOUS at 08:08

## 2022-08-01 RX ADMIN — BENZONATATE 100 MG: 100 CAPSULE ORAL at 08:09

## 2022-08-01 RX ADMIN — BENZONATATE 100 MG: 100 CAPSULE ORAL at 21:21

## 2022-08-01 RX ADMIN — POTASSIUM CHLORIDE 40 MEQ: 1500 TABLET, EXTENDED RELEASE ORAL at 17:33

## 2022-08-01 RX ADMIN — BENZONATATE 100 MG: 100 CAPSULE ORAL at 16:22

## 2022-08-01 RX ADMIN — ATORVASTATIN CALCIUM 10 MG: 10 TABLET, FILM COATED ORAL at 08:09

## 2022-08-01 RX ADMIN — AMLODIPINE BESYLATE 5 MG: 5 TABLET ORAL at 08:09

## 2022-08-01 RX ADMIN — ALBUTEROL SULFATE 2 PUFF: 90 AEROSOL, METERED RESPIRATORY (INHALATION) at 17:35

## 2022-08-01 RX ADMIN — ISODIUM CHLORIDE 3 ML: 0.03 SOLUTION RESPIRATORY (INHALATION) at 19:04

## 2022-08-01 RX ADMIN — BUDESONIDE AND FORMOTEROL FUMARATE DIHYDRATE 2 PUFF: 160; 4.5 AEROSOL RESPIRATORY (INHALATION) at 08:09

## 2022-08-01 RX ADMIN — UMECLIDINIUM 1 PUFF: 62.5 AEROSOL, POWDER ORAL at 08:09

## 2022-08-01 RX ADMIN — Medication 3 MG: at 21:21

## 2022-08-01 RX ADMIN — SITAGLIPTIN 100 MG: 100 TABLET, FILM COATED ORAL at 08:09

## 2022-08-01 RX ADMIN — BUMETANIDE 3 MG: 0.25 INJECTION INTRAMUSCULAR; INTRAVENOUS at 16:22

## 2022-08-01 RX ADMIN — APIXABAN 5 MG: 5 TABLET, FILM COATED ORAL at 17:33

## 2022-08-01 RX ADMIN — METOPROLOL SUCCINATE 50 MG: 50 TABLET, EXTENDED RELEASE ORAL at 08:09

## 2022-08-01 RX ADMIN — ISODIUM CHLORIDE 3 ML: 0.03 SOLUTION RESPIRATORY (INHALATION) at 07:07

## 2022-08-01 NOTE — PROGRESS NOTES
Heart Failure/ Pulmonary Hypertension Progress Note - Alyssa Tolbert 56 y o  male MRN: [id number]    Unit/Bed#: [facility] 510-01 Encounter: [id number]      Assessment:    Principal Problem:    Acute on chronic diastolic congestive heart failure (HCC)  Active Problems:    Essential hypertension    Type 2 diabetes mellitus with hyperglycemia (HCC)    Morbid obesity (HCC)    Moderate persistent asthma    Stage 3a chronic kidney disease (HCC)    URI (upper respiratory infection)    # Acute on Chronic HFpEF- HFimpeEF, Stage C, NYHA III     Volume up on exam today, recent CardioMems PAD 18-19mmHg, now up to mid 20's   Diuretic: recent increase to Bumex 3 mg BID w/ K 40 meq BID   Weight:  314 lbs   NT proBNP: 7/29/22: 903   609 4/8/22        [facility]:    RA 4    RV 35/4    PA 35/12/21    PCWP 10    PA sat 64%    Travis CO 5 56 L/min    Travis CI 2 2L/min/m2    PVR 1 97 SAGASTUME        TTE 11/12/2021:   LVEF 55%  LVIDd 6 0 cm  Grade 1 DD  Normal RV  Trace TR    TTE 07/19/2021:   LVEF 50%  LVIDd 6 13 cm  Grade 1 DD  Normal RV  Trace MR and TR  Mildly dilated IVC      TTE 03/25/2020:   LVEF 40-45%  LVIDd 6 12 cm  Normal RV          Neurohormonal Blockade:    --Beta Blocker: metoprolol succinate 50 mg daily  - changed to metoprolol succinate 25mg BID  --ARNi / ACEi / ARB: No      --Aldosterone Antagonist: No      --SGLT2 Inhibitor: No     --Diuretic: bumex 2mg in AM and 1mg in PM, potassium 20 meq BID   - recently uptitrated and   now up to bumex 2mg TID and potassium 60 meq total daily since yesterday   Inpt: Bumex 3 mg IV BID      Sudden Cardiac Death Risk Reduction:    --LVEF >35%           Electrical Resynchronization:    --Candidacy for BiV device: narrow QRS           Advanced Therapies:  Will continue to monitor   LVEF recovered        # Atrial fibrillation    [id number] = 3 (HF, HTN, DM)      Diagnosed 02/2022     Anticoagulation on Eliquis      Rate control: metoprolol as above  Rhythm control: No         # Hypertension, mostly controlled, amlodipine 5 mg added      # Hyperlipidemia   Most recent lipid panel from 10/01/2021: cholesterol 157; TG 90;   HDL 66; calculated LDL 73     Rx: atorvastatin 10 mg daily           # Diabetes mellitus, type II      Non-insulin dependent     HbA1c 6/16/22: 5 7%        # Obstructive sleep apnea   # Chronic respiratory failure    # Asthma, moderate persistent     # S/p gastric bypass (Samuel-en-Y)surgery     # History of tobacco abuse    # Carpal tunnel syndrome, bilateral     # CKD, Cr 1 56 - 1 88 today down from 1 94  # s/p CardioMems implant 3/9/22    PAD 25 mmHg- most recently - which is up for him       Plan:  Volume status improving  Continue diuresis with bumex 3mg IV BID today, possible transition to oral bumex tomorrow  Strict I/O and daily weights  SGLT2i as outpatient  Continue amlodipine for BP control  AC with apixaban      Subjective:   Patient seen and examined  No significant events overnight  No PND overnight  Still with orthopnea and shortness of breath on exertion but better  Lower extremity edema improving    Review of Systems   Constitutional: Positive for fever  Respiratory: Positive for shortness of breath  Negative for chest tightness  Cardiovascular: Positive for leg swelling  Negative for chest pain and palpitations  Gastrointestinal: Negative for abdominal distention and abdominal pain  Neurological: Negative for light-headedness  Objective: Intake/ Output: [id number], -[id number]  Weight: 308 lbs  Tele: sinus, NSVT vs afib with aberrancy      Vitals: Blood pressure 132/65, pulse 66, temperature 97 8 °F (36 6 °C), resp  rate 18, height 6' 1" (1 854 m), weight (!) 140 kg (308 lb 3 3 oz), SpO2 100 %  , Body mass index is 40 66 kg/m² , I/O last 3 completed shifts:   In: 1002 [P O :1616]  Out: 3164 [Urine:4155]  I/O this shift:  In: 360 [P O :360]  Out: 360 [Urine:360]  Wt Readings from Last 3 Encounters:   08/01/22 (!) 140 kg (308 lb 3 3 oz)   07/28/22 (!) 143 kg (314 lb 12 8 oz)   06/24/22 (!) 142 kg (312 lb 1 6 oz)       Intake/Output Summary (Last 24 hours) at 8/1/2022 0941  Last data filed at 8/1/2022 0381  Gross per 24 hour   Intake 1378 ml   Output 3135 ml   Net -1757 ml     I/O last 3 completed shifts:   In: 1616 [P O :1616]  Out: 6621 [Urine:4155]        Physical Exam:  Vitals:    08/01/22 0432 08/01/22 0434 08/01/22 0708 08/01/22 0713   BP:  119/71  132/65   BP Location:       Pulse:  66  66   Resp:       Temp:  97 7 °F (36 5 °C)  97 8 °F (36 6 °C)   TempSrc:       SpO2:  96% 97% 100%   Weight: (!) 140 kg (308 lb 3 3 oz)      Height:           GEN: Dub Paling appears well, alert and oriented x 3, pleasant and cooperative   HEENT: NC/AT, moist mucosa, anicteric sclerae; extraocular muscles intact  NECK: supple, no carotid bruits   HEART: regular rhythm, normal S1 and S2, no murmurs, clicks, gallops or rubs, JVP 3cm above the clavicle sitting upright   LUNGS: clear to auscultation bilaterally; no wheezes, rales, or rhonchi   ABDOMEN: normal bowel sounds, soft, no tenderness, no distention  EXTREMITIES: peripheral pulses normal; no clubbing, cyanosis; mild mostly nonpitting edema on both lower extremities  NEURO: no focal findings   SKIN: normal without suspicious lesions on exposed skin      Current Facility-Administered Medications:     acetaminophen (TYLENOL) tablet 650 mg, 650 mg, Oral, Q6H PRN, Can Loomis MD    albuterol (PROVENTIL HFA,VENTOLIN HFA) inhaler 2 puff, 2 puff, Inhalation, Q4H PRN, Can Loomis MD, 2 puff at 07/31/22 0800    aluminum-magnesium hydroxide-simethicone (MYLANTA) oral suspension 30 mL, 30 mL, Oral, Q6H PRN, Can Loomis MD    amLODIPine (NORVASC) tablet 5 mg, 5 mg, Oral, Daily, Lisa Armendariz DO 5 mg at 08/01/22 0809    apixaban (ELIQUIS) tablet 5 mg, 5 mg, Oral, BID, Can Loomis MD, 5 mg at 08/01/22 0809    atorvastatin (LIPITOR) tablet 10 mg, 10 mg, Oral, Daily, Servando Huston, MD, 10 mg at 08/01/22 0809    benzonatate (TESSALON PERLES) capsule 100 mg, 100 mg, Oral, TID, RODRIGUE Sotelo, 100 mg at 08/01/22 0809    budesonide-formoterol (SYMBICORT) 160-4 5 mcg/act inhaler 2 puff, 2 puff, Inhalation, BID, Kristina Tamayo MD, 2 puff at 08/01/22 0809    bumetanide (BUMEX) injection 3 mg, 3 mg, Intravenous, BID (diuretic), Kristina Tamayo MD, 3 mg at 08/01/22 0808    insulin lispro (HumaLOG) 100 units/mL subcutaneous injection 1-5 Units, 1-5 Units, Subcutaneous, TID AC, 1 Units at 07/30/22 1128 **AND** Fingerstick Glucose (POCT), , , TID AC, Kristina Tamayo MD    insulin lispro (HumaLOG) 100 units/mL subcutaneous injection 1-5 Units, 1-5 Units, Subcutaneous, HS, Kristina Tamayo MD, 1 Units at 07/31/22 2103    levalbuterol (XOPENEX) inhalation solution 1 25 mg, 1 25 mg, Nebulization, BID, Cheryle Kumar, DO, 1 25 mg at 08/01/22 0707    loratadine (CLARITIN) tablet 10 mg, 10 mg, Oral, Daily, RODRIGUE Sotelo, 10 mg at 08/01/22 0809    melatonin tablet 3 mg, 3 mg, Oral, HS, RODRIGUE Sotelo, 3 mg at 07/31/22 2101    metoprolol succinate (TOPROL-XL) 24 hr tablet 50 mg, 50 mg, Oral, Daily, Kristina Tamayo MD, 50 mg at 08/01/22 0809    montelukast (SINGULAIR) tablet 10 mg, 10 mg, Oral, HS, Kristina Tamayo MD, 10 mg at 07/31/22 2101    ondansetron (ZOFRAN) injection 4 mg, 4 mg, Intravenous, Q6H PRN, Kristina Tamayo MD    polyethylene glycol (MIRALAX) packet 17 g, 17 g, Oral, Daily PRN, Kristina Tamayo MD    potassium chloride (K-DUR,KLOR-CON) CR tablet 40 mEq, 40 mEq, Oral, BID, Kristina Tamayo MD, 40 mEq at 08/01/22 0809    sitaGLIPtin (JANUVIA) tablet 100 mg, 100 mg, Oral, Daily, Kristina Tamaoy MD, 100 mg at 08/01/22 0809    sodium chloride 0 9 % inhalation solution 3 mL, 3 mL, Nebulization, BID, Cheryle Kumar DO, 3 mL at 08/01/22 0707    umeclidinium bromide (INCRUSE ELLIPTA) 62 5 mcg/inh inhaler AEPB 1 puff, 1 puff, Inhalation, Daily, Kristina Tamayo MD, 1 puff at 08/01/22 0809      Labs & Results:        Results from last 7 days   Lab Units 07/30/22  0535 07/29/22  1322   WBC Thousand/uL 8 16 9 60   HEMOGLOBIN g/dL 11 7* 12 4   HEMATOCRIT % 37 2 38 2   PLATELETS Thousands/uL 259 324         Results from last 7 days   Lab Units 08/01/22  0435 07/31/22  1204 07/30/22  0535 07/29/22  1322   POTASSIUM mmol/L 4 0 4 5 4 1 3 9   CHLORIDE mmol/L 106 102 103 101   CO2 mmol/L 29 29 32 28   BUN mg/dL 32* 28* 30* 26*   CREATININE mg/dL 1 88* 1 94* 1 70* 1 56*   CALCIUM mg/dL 8 7 9 4 8 6 8 9   ALK PHOS U/L  --   --   --  91   ALT U/L  --   --   --  13   AST U/L  --   --   --  11           Counseling / Coordination of Care  Total floor / unit time spent today 25 minutes  Greater than 50% of total time was spent with the patient and / or family counseling and / or coordination of care  A description of the counseling / coordination of care: 15  Thank you for the opportunity to participate in the care of this patient      Марина Vilchis MD  Advanced Heart Failure and Mechanical Circulatory   [address]

## 2022-08-01 NOTE — UTILIZATION REVIEW
Continued Stay Review    Date: 8/1                       Current Patient Class: Inpatient  Current Level of Care: Med Surg    HPI:57 y o  male initially admitted on 7/29    Assessment/Plan: Acute on chronic HFpEF  Per Cardiology Med adjustment; changed metoprolol to 25 mg bid  Volume status improving  Continue diuresis with Iv Bumex bid today  Continue amlodipine for BP control  Still with orthopnea and shortness of breath on exertion but better   Lower extremity edema improving    Vital Signs:   08/01/22 11:11:07 98 2 °F (36 8 °C) 72 18 118/74 89 95 % -- --   08/01/22 07:13:57 97 8 °F (36 6 °C) 66 -- 132/65 87 100 % -- --   08/01/22 0708 -- -- -- -- -- 97 % None (Room air) --     Pertinent Labs/Diagnostic Results:       Results from last 7 days   Lab Units 07/30/22  0535 07/29/22  1322   WBC Thousand/uL 8 16 9 60   HEMOGLOBIN g/dL 11 7* 12 4   HEMATOCRIT % 37 2 38 2   PLATELETS Thousands/uL 259 324   NEUTROS ABS Thousands/µL  --  5 94         Results from last 7 days   Lab Units 08/01/22  0435 07/31/22  1204 07/30/22  0535 07/29/22  1322 07/27/22  1115   SODIUM mmol/L 139 137 137 136 138   POTASSIUM mmol/L 4 0 4 5 4 1 3 9 4 0   CHLORIDE mmol/L 106 102 103 101 105   CO2 mmol/L 29 29 32 28 30   ANION GAP mmol/L 4 6 2* 7 3*   BUN mg/dL 32* 28* 30* 26* 23   CREATININE mg/dL 1 88* 1 94* 1 70* 1 56* 1 75*   EGFR ml/min/1 73sq m 39 37 44 49 42   CALCIUM mg/dL 8 7 9 4 8 6 8 9 8 9   MAGNESIUM mg/dL  --   --  2 1  --   --      Results from last 7 days   Lab Units 07/29/22  1322   AST U/L 11   ALT U/L 13   ALK PHOS U/L 91   TOTAL PROTEIN g/dL 7 2   ALBUMIN g/dL 3 2*   TOTAL BILIRUBIN mg/dL 0 42     Results from last 7 days   Lab Units 08/01/22  1131 08/01/22  0609 07/31/22  2103 07/31/22  1650 07/31/22  1058 07/31/22  0634 07/30/22  2118 07/30/22  1624 07/30/22  1106 07/30/22  0613 07/29/22  2048   POC GLUCOSE mg/dl 172* 126 165* 131 143* 125 158* 145* 150* 139 134     Results from last 7 days   Lab Units 08/01/22  0435 07/31/22  1204 07/30/22  0535 07/29/22  1322 07/27/22  1115   GLUCOSE RANDOM mg/dL 109 169* 129 139 105         Results from last 7 days   Lab Units 07/29/22  1752 07/29/22  1550 07/29/22  1322   HS TNI 0HR ng/L  --   --  13   HS TNI 2HR ng/L  --  12  --    HSTNI D2 ng/L  --  -1  --    HS TNI 4HR ng/L 12  --   --    HSTNI D4 ng/L -1  --   --          Results from last 7 days   Lab Units 07/29/22  1322   NT-PRO BNP pg/mL 903*       Medications:   Scheduled Medications:  amLODIPine, 5 mg, Oral, Daily  apixaban, 5 mg, Oral, BID  atorvastatin, 10 mg, Oral, Daily  benzonatate, 100 mg, Oral, TID  budesonide-formoterol, 2 puff, Inhalation, BID  bumetanide, 3 mg, Intravenous, BID (diuretic)  insulin lispro, 1-5 Units, Subcutaneous, TID AC  insulin lispro, 1-5 Units, Subcutaneous, HS  levalbuterol, 1 25 mg, Nebulization, BID  loratadine, 10 mg, Oral, Daily  melatonin, 3 mg, Oral, HS  metoprolol succinate, 50 mg, Oral, Daily  montelukast, 10 mg, Oral, HS  potassium chloride, 40 mEq, Oral, BID  sitaGLIPtin, 100 mg, Oral, Daily  sodium chloride, 3 mL, Nebulization, BID  umeclidinium bromide, 1 puff, Inhalation, Daily      Continuous IV Infusions: None     PRN Meds:  acetaminophen, 650 mg, Oral, Q6H PRN  albuterol, 2 puff, Inhalation, Q4H PRN  aluminum-magnesium hydroxide-simethicone, 30 mL, Oral, Q6H PRN  ondansetron, 4 mg, Intravenous, Q6H PRN  polyethylene glycol, 17 g, Oral, Daily PRN      Discharge Plan: D    Network Utilization Review Department  ATTENTION: Please call with any questions or concerns to [phone] and carefully listen to the prompts so that you are directed to the right person  All voicemails are confidential   [facility] all requests for admission clinical reviews, approved or denied determinations and any other requests to dedicated fax number below belonging to the campus where the patient is receiving treatment   List of dedicated fax numbers for the Facilities:  [address] DENIALS (Administrative/Medical Necessity) [phone]   [address] (Maternity/NICU/Pediatrics) 261 [facility],7Th Floor [facility] 40 [address] [place] [address]  [phone]   Roxi Edwards 50 [address] [address] [place] [address] [address] [facility] [address] [phone]

## 2022-08-01 NOTE — PROGRESS NOTES
Spiritual Care Progress Note    2022  Patient: Jonathon Jj : 1965  Admission Date & Time: 2022 1235  MRN: [id number] CSN: [id number]       visited patient during unit rounds   provided introduction to spiritual care and inquired about patient's emotional wellbeing  Patient shared feeling "great" and noted he rarely feels concerned about hospitalizations as he has been in and out of the hospital frequently this year  Patient named his wife and daughter as support systems along with his [facility] community, Anita Jarrett  Pt expressed gratitude for the visit  Spiritual care will remain available               Chaplaincy Interventions Utilized:   Empowerment: Provided chaplaincy education    Exploration: Explored emotional needs & resources    Relationship Building: Cultivated a relationship of care and support      Chaplaincy Outcomes Achieved:  Expressed gratitude     [address] Involvement Patient active with Voodoo   Pent[employer]ost[employer] Leader Aware/Contacted Leader/Voodoo aware of patient's status   Spiritual Beliefs/Perceptions   Relationship with God Close   Support Systems Spouse/significant other;Daughter   Coping Responses   Patient Coping Accepting;Open/discussion   Patient Spiritual Assessment   Feelings of Well Being Pt confirmed   Plan of Care   Assessment Completed by: Unit visit

## 2022-08-01 NOTE — CASE MANAGEMENT
Case Management Assessment    Patient name Manish Shafer  Location [facility] 510/[facility] 740-36 MRN [id number]  : 1965 Date 2022       Current Admission Date: 2022  Current Admission Diagnosis:Acute on chronic diastolic congestive heart failure [facility])   Patient Active Problem List    Diagnosis Date Noted    URI (upper respiratory infection) 2022    Leg cramps 2022    Paroxysmal atrial fibrillation ([facility] ) 2022    Stage 3a chronic kidney disease ([facility] ) 2021    Hypokalemia 2021    Acute on chronic diastolic congestive heart failure ([facility] ) 2021    Moderate persistent asthma 10/11/2021    Dyspnea on exertion 10/11/2021    Cardiomyopathy ([facility] ) 2021    Well adult exam 2021    Bilateral leg edema 2020    Edema of both feet 2020    VICKY (obstructive sleep apnea)     Daytime sleepiness 2019    Mixed hyperlipidemia 2019    Morbid obesity ([facility] ) 2019    Vitamin B12 deficiency 2019    Vitamin D deficiency 2019    Knee sprain, bilateral 2018    Primary osteoarthritis of both knees 2018    Irritable bowel syndrome with diarrhea 2018    Essential hypertension 2018    Type 2 diabetes mellitus with hyperglycemia ([facility] ) 2018      LOS (days): 3  Geometric Mean LOS (GMLOS) (days):   Days to GMLOS:     OBJECTIVE:    Risk of Unplanned Readmission Score: 28 03         Current admission status: Inpatient       Preferred Pharmacy:   [address]  [address] [facility] [address]  Phone: [phone] Fax: [phone]    [id number] - [address] [facility]  [address] [place] [address]  Phone: [phone] Fax: [phone]    Primary Care Provider: Marybeth Souza MD    Primary Insurance: [facility]  Secondary Insurance:     ASSESSMENT:  Leny Juarez [address] Representative - Spouse   Primary Phone: [phone] (Home)                 Patient Information  Admitted from[de-identified] Home  Mental Status: Alert  During Assessment patient was accompanied by: Not accompanied during assessment  Assessment information provided by[de-identified] Patient  Primary Caregiver: Self  Support Systems: Spouse/significant other, Daughter  Home entry access options   Select all that apply : Stairs  Number of steps to enter home : 6  Type of Current Residence: 3 story home  Upon entering residence, is there a bedroom on the main floor (no further steps)?: No  A bedroom is located on the following floor levels of residence (select all that apply):: 3rd Floor  Upon entering residence, is there a bathroom on the main floor (no further steps)?: No  Indicate which floors of current residence have a bathroom (select all the apply):: 3rd Floor  Number of steps to 3rd floor from main floor: Two Flights  Homeless/housing insecurity resource given?: N/A  Living Arrangements: Lives w/ Spouse/significant other    Activities of Daily Living Prior to Admission  Functional Status: Independent  Completes ADLs independently?: Yes  Ambulates independently?: Yes  Does patient use assisted devices?: No  Does patient currently own DME?: No  Does patient have a history of Outpatient Therapy (PT/OT)?: Yes  Does the patient have a history of Short-Term Rehab?: No  Does patient have a history of HHC?: No  Does patient currently have [place] [address]?: No    Patient Information Continued  Income Source: Employed  Food insecurity resource given?: N/A  Does patient receive dialysis treatments?: No  Does patient have a history of substance abuse?: No    Means of Transportation  Means of Transport to [facility][de-identified] Drives Self  Was application for public transport provided?: N/A

## 2022-08-01 NOTE — TELEPHONE ENCOUNTER
Niecy Herndon called asking if you would be willing to sign a medical certification form for [facility]  States [employer] is going to turn off his gas due to unpaid bill since April  Form will not excuse patient from billing just give him 30 more days to come up w/ funds  I advised Keno I would discuss w/ you and return his call-- verbally understood

## 2022-08-02 LAB
ANION GAP SERPL CALCULATED.3IONS-SCNC: 3 MMOL/L (ref 4–13)
BASOPHILS # BLD AUTO: 0.08 THOUSANDS/ΜL (ref 0–0.1)
BASOPHILS NFR BLD AUTO: 1 % (ref 0–1)
BUN SERPL-MCNC: 37 MG/DL (ref 5–25)
CALCIUM SERPL-MCNC: 8.9 MG/DL (ref 8.3–10.1)
CHLORIDE SERPL-SCNC: 105 MMOL/L (ref 96–108)
CO2 SERPL-SCNC: 30 MMOL/L (ref 21–32)
CREAT SERPL-MCNC: 1.92 MG/DL (ref 0.6–1.3)
EOSINOPHIL # BLD AUTO: 0.88 THOUSAND/ΜL (ref 0–0.61)
EOSINOPHIL NFR BLD AUTO: 10 % (ref 0–6)
ERYTHROCYTE [DISTWIDTH] IN BLOOD BY AUTOMATED COUNT: 13.2 % (ref 11.6–15.1)
GFR SERPL CREATININE-BSD FRML MDRD: 38 ML/MIN/1.73SQ M
GLUCOSE SERPL-MCNC: 116 MG/DL (ref 65–140)
GLUCOSE SERPL-MCNC: 129 MG/DL (ref 65–140)
GLUCOSE SERPL-MCNC: 139 MG/DL (ref 65–140)
GLUCOSE SERPL-MCNC: 141 MG/DL (ref 65–140)
GLUCOSE SERPL-MCNC: 141 MG/DL (ref 65–140)
HCT VFR BLD AUTO: 37.7 % (ref 36.5–49.3)
HGB BLD-MCNC: 11.8 G/DL (ref 12–17)
IMM GRANULOCYTES # BLD AUTO: 0.05 THOUSAND/UL (ref 0–0.2)
IMM GRANULOCYTES NFR BLD AUTO: 1 % (ref 0–2)
LYMPHOCYTES # BLD AUTO: 1.46 THOUSANDS/ΜL (ref 0.6–4.47)
LYMPHOCYTES NFR BLD AUTO: 16 % (ref 14–44)
MCH RBC QN AUTO: 29.7 PG (ref 26.8–34.3)
MCHC RBC AUTO-ENTMCNC: 31.3 G/DL (ref 31.4–37.4)
MCV RBC AUTO: 95 FL (ref 82–98)
MONOCYTES # BLD AUTO: 0.78 THOUSAND/ΜL (ref 0.17–1.22)
MONOCYTES NFR BLD AUTO: 9 % (ref 4–12)
NEUTROPHILS # BLD AUTO: 5.73 THOUSANDS/ΜL (ref 1.85–7.62)
NEUTS SEG NFR BLD AUTO: 63 % (ref 43–75)
NRBC BLD AUTO-RTO: 0 /100 WBCS
PLATELET # BLD AUTO: 243 THOUSANDS/UL (ref 149–390)
PMV BLD AUTO: 10.5 FL (ref 8.9–12.7)
POTASSIUM SERPL-SCNC: 4.1 MMOL/L (ref 3.5–5.3)
RBC # BLD AUTO: 3.97 MILLION/UL (ref 3.88–5.62)
SODIUM SERPL-SCNC: 138 MMOL/L (ref 135–147)
WBC # BLD AUTO: 8.98 THOUSAND/UL (ref 4.31–10.16)

## 2022-08-02 PROCEDURE — 94640 AIRWAY INHALATION TREATMENT: CPT

## 2022-08-02 PROCEDURE — 80048 BASIC METABOLIC PNL TOTAL CA: CPT | Performed by: INTERNAL MEDICINE

## 2022-08-02 PROCEDURE — 99232 SBSQ HOSP IP/OBS MODERATE 35: CPT | Performed by: INTERNAL MEDICINE

## 2022-08-02 PROCEDURE — 99232 SBSQ HOSP IP/OBS MODERATE 35: CPT | Performed by: HOSPITALIST

## 2022-08-02 PROCEDURE — 82948 REAGENT STRIP/BLOOD GLUCOSE: CPT

## 2022-08-02 PROCEDURE — 85025 COMPLETE CBC W/AUTO DIFF WBC: CPT | Performed by: INTERNAL MEDICINE

## 2022-08-02 PROCEDURE — 94760 N-INVAS EAR/PLS OXIMETRY 1: CPT

## 2022-08-02 RX ADMIN — LORATADINE 10 MG: 10 TABLET ORAL at 08:57

## 2022-08-02 RX ADMIN — ALBUTEROL SULFATE 2 PUFF: 90 AEROSOL, METERED RESPIRATORY (INHALATION) at 17:07

## 2022-08-02 RX ADMIN — ISODIUM CHLORIDE 3 ML: 0.03 SOLUTION RESPIRATORY (INHALATION) at 07:26

## 2022-08-02 RX ADMIN — UMECLIDINIUM 1 PUFF: 62.5 AEROSOL, POWDER ORAL at 09:00

## 2022-08-02 RX ADMIN — MONTELUKAST SODIUM 10 MG: 10 TABLET, FILM COATED ORAL at 21:14

## 2022-08-02 RX ADMIN — ATORVASTATIN CALCIUM 10 MG: 10 TABLET, FILM COATED ORAL at 08:56

## 2022-08-02 RX ADMIN — LEVALBUTEROL HYDROCHLORIDE 1.25 MG: 1.25 SOLUTION, CONCENTRATE RESPIRATORY (INHALATION) at 19:00

## 2022-08-02 RX ADMIN — SITAGLIPTIN 100 MG: 100 TABLET, FILM COATED ORAL at 09:00

## 2022-08-02 RX ADMIN — Medication 3 MG: at 21:14

## 2022-08-02 RX ADMIN — APIXABAN 5 MG: 5 TABLET, FILM COATED ORAL at 08:56

## 2022-08-02 RX ADMIN — APIXABAN 5 MG: 5 TABLET, FILM COATED ORAL at 17:02

## 2022-08-02 RX ADMIN — POTASSIUM CHLORIDE 40 MEQ: 1500 TABLET, EXTENDED RELEASE ORAL at 17:03

## 2022-08-02 RX ADMIN — BUMETANIDE 3 MG: 2 TABLET ORAL at 17:03

## 2022-08-02 RX ADMIN — BUDESONIDE AND FORMOTEROL FUMARATE DIHYDRATE 2 PUFF: 160; 4.5 AEROSOL RESPIRATORY (INHALATION) at 17:07

## 2022-08-02 RX ADMIN — METOPROLOL SUCCINATE 50 MG: 50 TABLET, EXTENDED RELEASE ORAL at 08:56

## 2022-08-02 RX ADMIN — BENZONATATE 100 MG: 100 CAPSULE ORAL at 21:14

## 2022-08-02 RX ADMIN — POTASSIUM CHLORIDE 40 MEQ: 1500 TABLET, EXTENDED RELEASE ORAL at 08:56

## 2022-08-02 RX ADMIN — BENZONATATE 100 MG: 100 CAPSULE ORAL at 08:56

## 2022-08-02 RX ADMIN — BUMETANIDE 3 MG: 0.25 INJECTION INTRAMUSCULAR; INTRAVENOUS at 08:57

## 2022-08-02 RX ADMIN — AMLODIPINE BESYLATE 5 MG: 5 TABLET ORAL at 08:57

## 2022-08-02 RX ADMIN — LEVALBUTEROL HYDROCHLORIDE 1.25 MG: 1.25 SOLUTION, CONCENTRATE RESPIRATORY (INHALATION) at 07:26

## 2022-08-02 RX ADMIN — BUDESONIDE AND FORMOTEROL FUMARATE DIHYDRATE 2 PUFF: 160; 4.5 AEROSOL RESPIRATORY (INHALATION) at 09:00

## 2022-08-02 RX ADMIN — BENZONATATE 100 MG: 100 CAPSULE ORAL at 17:02

## 2022-08-02 RX ADMIN — ISODIUM CHLORIDE 3 ML: 0.03 SOLUTION RESPIRATORY (INHALATION) at 19:00

## 2022-08-02 NOTE — ASSESSMENT & PLAN NOTE
Lab Results   Component Value Date    HGBA1C 5 7 06/16/2022       Recent Labs     08/01/22  1551 08/01/22  2046 08/02/22  0615 08/02/22  1046   POCGLU 113 142* 116 141*       Blood Sugar Average: Last 72 hrs:  · (P) 140 [id number]   · continue outpatient januvia  · Fingerstick sliding scale coverage

## 2022-08-02 NOTE — PROGRESS NOTES
[address] [facility]  Progress Note - Walker Caebllo 1965, 56 y o  male MRN: [id number]  Unit/Bed#: [facility] 510-01 Encounter: [id number]  Primary Care Provider: Halima Stewart MD   Date and time admitted to hospital: 7/29/2022 12:35 PM    * Acute on chronic diastolic congestive heart failure [facility])  Assessment & Plan  Wt Readings from Last 3 Encounters:   08/02/22 (!) 140 kg (307 lb 12 2 oz)   07/28/22 (!) 143 kg (314 lb 12 8 oz)   06/24/22 (!) 142 kg (312 lb 1 6 oz)       · Patient with increased shortness of breath for a week and half  · Positive lower extremity edema, improving   · Patient seen by cardiology in the ED started on Bumex 3 mg IV b i d, switch to PO Bumex 3 mg BID tonight & watch response (home regimen - PO bumex 2 mg QAM & 1 mg QPM)  · Strict /I/os/ Daily weights   · Fluid restriction 1 8 liter  · Continue bb per HF but started on norvasc no ace dt renal function       URI (upper respiratory infection)  Assessment & Plan  Some mild nasal congestion - possibly contributing to productive cough   · Started Claritin for allergies   · Question environmental allergies vs viral   · Added resp protocol pt requesting maybe one tx a day   · Order tessalon pearls for cough TID  , this is helping cough   · Would recommend flutter valve     Stage 3a chronic kidney disease [facility])  Assessment & Plan  Lab Results   Component Value Date    EGFR 38 08/02/2022    EGFR 39 08/01/2022    EGFR 37 07/31/2022    CREATININE 1 92 (H) 08/02/2022    CREATININE 1 88 (H) 08/01/2022    CREATININE 1 94 (H) 07/31/2022   · B/L 1 6 - 1 70   · strict I/so daily weights  · Avoid nephrotoxins   · Avoid hypotension   · Monitor on IV diuretic    Moderate persistent asthma  Assessment & Plan  · Patient with history of moderate persistent asthma  · Not in acute exacerbation    Morbid obesity ([address] )  Assessment & Plan  · Morbid obesity noted  · Dietary maneuvers discussed    Type 2 diabetes mellitus with hyperglycemia [facility])  Assessment & Plan  Lab Results   Component Value Date    HGBA1C 5 7 2022       Recent Labs     22  1551 22  2046 22  0615 22  1046   POCGLU 113 142* 116 141*       Blood Sugar Average: Last 72 hrs:  · (P) 140 [id number]   · continue outpatient januvia  · Fingerstick sliding scale coverage    Essential hypertension  Assessment & Plan  · Continue Toprol XL 50 mg daily, norvasc 5 mg QD          VTE Pharmacologic Prophylaxis:   Moderate Risk (Score 3-4) - Pharmacological DVT Prophylaxis Ordered: apixaban (Eliquis)  Patient Centered Rounds: I performed bedside rounds with nursing staff today  Discussions with Specialists or Other Care Team Provider:     Education and Discussions with Family / Patient: patient  Time Spent for Care: 30 minutes  More than 50% of total time spent on counseling and coordination of care as described above  Current Length of Stay: 4 day(s)  Current Patient Status: Inpatient   Certification Statement: The patient will continue to require additional inpatient hospital stay due to monitor response on PO bumex  Discharge Plan: Anticipate discharge tomorrow to home  Code Status: Level 1 - Full Code    Subjective:   Feels better than before, still gets winded with moderate exertion    Objective:     Vitals:   Temp (24hrs), Av 8 °F (36 6 °C), Min:97 6 °F (36 4 °C), Max:98 1 °F (36 7 °C)    Temp:  [97 6 °F (36 4 °C)-98 1 °F (36 7 °C)] 97 9 °F (36 6 °C)  HR:  [62-85] 72  Resp:  [16-18] 16  BP: ()/(54-74) 111/58  SpO2:  [95 %-99 %] 97 %  Body mass index is 40 6 kg/m²  Input and Output Summary (last 24 hours): Intake/Output Summary (Last 24 hours) at 2022 1517  Last data filed at 2022 1452  Gross per 24 hour   Intake 820 ml   Output 2350 ml   Net -1530 ml       Physical Exam:   Physical Exam  Vitals reviewed  HENT:      Head: Normocephalic and atraumatic     Cardiovascular:      Rate and Rhythm: Normal rate and regular rhythm  Heart sounds: Normal heart sounds  Pulmonary:      Effort: Pulmonary effort is normal  No respiratory distress  Breath sounds: Normal breath sounds  No wheezing  Abdominal:      General: There is no distension  Palpations: Abdomen is soft  Tenderness: There is no abdominal tenderness  Musculoskeletal:      Right lower leg: No edema  Left lower leg: No edema  Skin:     General: Skin is warm  Neurological:      Mental Status: He is alert and oriented to person, place, and time  Additional Data:     Labs:  Results from last 7 days   Lab Units 08/02/22  0312   WBC Thousand/uL 8 98   HEMOGLOBIN g/dL 11 8*   HEMATOCRIT % 37 7   PLATELETS Thousands/uL 243   NEUTROS PCT % 63   LYMPHS PCT % 16   MONOS PCT % 9   EOS PCT % 10*     Results from last 7 days   Lab Units 08/02/22  0300 07/30/22  0535 07/29/22  1322   SODIUM mmol/L 138   < > 136   POTASSIUM mmol/L 4 1   < > 3 9   CHLORIDE mmol/L 105   < > 101   CO2 mmol/L 30   < > 28   BUN mg/dL 37*   < > 26*   CREATININE mg/dL 1 92*   < > 1 56*   ANION GAP mmol/L 3*   < > 7   CALCIUM mg/dL 8 9   < > 8 9   ALBUMIN g/dL  --   --  3 2*   TOTAL BILIRUBIN mg/dL  --   --  0 42   ALK PHOS U/L  --   --  91   ALT U/L  --   --  13   AST U/L  --   --  11   GLUCOSE RANDOM mg/dL 129   < > 139    < > = values in this interval not displayed           Results from last 7 days   Lab Units 08/02/22  1046 08/02/22  0615 08/01/22  2046 08/01/22  1551 08/01/22  1131 08/01/22  0609 07/31/22  2103 07/31/22  1650 07/31/22  1058 07/31/22  0634 07/30/22  2118 07/30/22  1624   POC GLUCOSE mg/dl 141* 116 142* 113 172* 126 165* 131 143* 125 158* 145*               Lines/Drains:  Invasive Devices  Report    Peripheral Intravenous Line  Duration           Peripheral IV 07/31/22 Right Forearm 1 day                  Telemetry:  Telemetry Orders (From admission, onward)             48 Hour Telemetry Monitoring  Continuous x 48 hours        References: Telemetry Guidelines   Question:  Reason for 48 Hour Telemetry  Answer:  Arrhythmias Requiring Medical Therapy (eg  SVT, Vtach/fib, Bradycardia, Uncontrolled A-fib)                 Telemetry Reviewed: Normal Sinus Rhythm  Indication for Continued Telemetry Use: Acute CHF on >200 mg lasix/day or equivalent dose or with new reduced EF  Imaging: No pertinent imaging reviewed      Recent Cultures (last 7 days):         Last 24 Hours Medication List:   Current Facility-Administered Medications   Medication Dose Route Frequency Provider Last Rate    acetaminophen  650 mg Oral Q6H PRN Kwame Saleh MD      albuterol  2 puff Inhalation Q4H PRN Kwame Saleh MD      aluminum-magnesium hydroxide-simethicone  30 mL Oral Q6H PRN Kwame Saleh MD      amLODIPine  5 mg Oral Daily Félix Medrano DO      apixaban  5 mg Oral BID Kwame Saleh MD      atorvastatin  10 mg Oral Daily Kwame Saleh MD      benzonatate  100 mg Oral TID RODRIGUE [place]      budesonide-formoterol  2 puff Inhalation BID Kwame Saleh MD      bumetanide  3 mg Oral BID (diuretic) Aryan Robertson MD      insulin lispro  1-5 Units Subcutaneous TID AC Kwame Saleh MD      insulin lispro  1-5 Units Subcutaneous HS Kwame Saleh MD      levalbuterol  1 25 mg Nebulization BID Cheryle Kumar DO      loratadine  10 mg Oral Daily RODRIGUE [place]      melatonin  3 mg Oral HS RODRIGUE [place]      metoprolol succinate  50 mg Oral Daily Kwame Saleh MD      montelukast  10 mg Oral HS Kwame Saleh MD      ondansetron  4 mg Intravenous Q6H PRN Kwame Saleh MD      polyethylene glycol  17 g Oral Daily PRN Kwame Saleh MD      potassium chloride  40 mEq Oral BID Kwame Saleh MD      sitaGLIPtin  100 mg Oral Daily Kwame Saleh MD      sodium chloride  3 mL Nebulization BID Cheryle Kumar DO      umeclidinium bromide  1 puff Inhalation Daily Kwame Saleh MD          Today, Patient Was Seen By: Sharona Woodall MD    **Please Note: This note may have been constructed using a voice recognition system  **

## 2022-08-02 NOTE — TELEPHONE ENCOUNTER
S/w Dr Flores Hearing, ok to do [employer] medical certification  Azalea Rivera called requesting fax number   Provided back fax #-- will await fax

## 2022-08-02 NOTE — ASSESSMENT & PLAN NOTE
Wt Readings from Last 3 Encounters:   08/02/22 (!) 140 kg (307 lb 12 2 oz)   07/28/22 (!) 143 kg (314 lb 12 8 oz)   06/24/22 (!) 142 kg (312 lb 1 6 oz)       · Patient with increased shortness of breath for a week and half  · Positive lower extremity edema, improving   · Patient seen by cardiology in the ED started on Bumex 3 mg IV b i d, switch to PO Bumex 3 mg BID tonight & watch response (home regimen - PO bumex 2 mg QAM & 1 mg QPM)  · Strict /I/os/ Daily weights   · Fluid restriction 1 8 liter  · Continue bb per HF but started on norvasc no ace dt renal function [FreeTextEntry1] : 3/2021 labs\par Serum creatinine 0.77\par TSH 2.83\par Spot urine protein to creatinine ratio <0.133

## 2022-08-02 NOTE — ASSESSMENT & PLAN NOTE
Some mild nasal congestion - possibly contributing to productive cough   · Started Claritin for allergies   · Question environmental allergies vs viral   · Added resp protocol pt requesting maybe one tx a day   · Order tessalon pearls for cough TID  , this is helping cough   · Would recommend flutter valve

## 2022-08-02 NOTE — PROGRESS NOTES
Heart Failure/ Pulmonary Hypertension Progress Note - Vasiliy Joiner 56 y o  male MRN: [id number]    Unit/Bed#: [facility] 510-01 Encounter: [id number]      Assessment:    Principal Problem:    Acute on chronic diastolic congestive heart failure (HCC)  Active Problems:    Essential hypertension    Type 2 diabetes mellitus with hyperglycemia (HCC)    Morbid obesity (HCC)    Moderate persistent asthma    Stage 3a chronic kidney disease (HCC)    URI (upper respiratory infection)    # Acute on Chronic HFpEF- HFimpeEF, Stage C, NYHA III     Volume up on exam today, recent CardioMems PAD 18-19mmHg, now up to mid 20's   Diuretic: recent increase to Bumex 3 mg BID w/ K 40 meq BID   Weight:  314 lbs   NT proBNP: 7/29/22: 903   609 4/8/22        [facility]:    RA 4    RV 35/4    PA 35/12/21    PCWP 10    PA sat 64%    Travis CO 5 56 L/min    Travis CI 2 2L/min/m2    PVR 1 97 SAGASTUME        TTE 11/12/2021:   LVEF 55%  LVIDd 6 0 cm  Grade 1 DD  Normal RV  Trace TR    TTE 07/19/2021:   LVEF 50%  LVIDd 6 13 cm  Grade 1 DD  Normal RV  Trace MR and TR  Mildly dilated IVC      TTE 03/25/2020:   LVEF 40-45%  LVIDd 6 12 cm  Normal RV          Neurohormonal Blockade:    --Beta Blocker: metoprolol succinate 50 mg daily  - changed to metoprolol succinate 25mg BID  --ARNi / ACEi / ARB: No      --Aldosterone Antagonist: No      --SGLT2 Inhibitor: No     --Diuretic: bumex 2mg in AM and 1mg in PM, potassium 20 meq BID   - recently uptitrated and   now up to bumex 2mg TID and potassium 60 meq total daily since yesterday   Inpt: Bumex 3 mg IV BID      Sudden Cardiac Death Risk Reduction:    --LVEF >35%           Electrical Resynchronization:    --Candidacy for BiV device: narrow QRS           Advanced Therapies:  Will continue to monitor   LVEF recovered        # Atrial fibrillation    [id number] = 3 (HF, HTN, DM)      Diagnosed 02/2022     Anticoagulation on Eliquis      Rate control: metoprolol as above  Rhythm control: No         # Hypertension, mostly controlled, amlodipine 5 mg added      # Hyperlipidemia   Most recent lipid panel from 10/01/2021: cholesterol 157; TG 90;   HDL 66; calculated LDL 73     Rx: atorvastatin 10 mg daily           # Diabetes mellitus, type II      Non-insulin dependent     HbA1c 6/16/22: 5 7%        # Obstructive sleep apnea   # Chronic respiratory failure    # Asthma, moderate persistent     # S/p gastric bypass (Samuel-en-Y)surgery     # History of tobacco abuse    # Carpal tunnel syndrome, bilateral     # CKD, Cr 1 56 - 1 94 7/31, 1 92 today  # s/p CardioMems implant 3/9/22    PAD 25 mmHg- most recently - which is up for him, down to 16 today      Plan:  Transition to oral bumex 3mg BID  PAD down to 17 today  Strict I/O and daily weights  SGLT2i as outpatient  Continue amlodipine for BP control  AC with apixaban      Subjective:   Patient seen and examined  No significant events overnight  Shortness of breath improving  Review of Systems   Constitutional: Negative for fever  Respiratory: Positive for shortness of breath  Negative for chest tightness  Cardiovascular: Positive for leg swelling  Negative for chest pain and palpitations  Gastrointestinal: Negative for abdominal distention and abdominal pain  Neurological: Negative for light-headedness  Objective: Intake/ Output: 1200/2350, -1150  Weight: 307 lbs  Tele: sinus      Vitals: Blood pressure 127/65, pulse 85, temperature 97 6 °F (36 4 °C), temperature source Oral, resp  rate 18, height 6' 1" (1 854 m), weight (!) 140 kg (307 lb 12 2 oz), SpO2 97 %  , Body mass index is 40 6 kg/m² , I/O last 3 completed shifts:   In: 1680 [P O :1440; I V :240]  Out: 2900 [Urine:2900]  I/O this shift:  In: -   Out: 300 [Urine:300]  Wt Readings from Last 3 Encounters:   08/02/22 (!) 140 kg (307 lb 12 2 oz)   07/28/22 (!) 143 kg (314 lb 12 8 oz)   06/24/22 (!) 142 kg (312 lb 1 6 oz)       Intake/Output Summary (Last 24 hours) at 8/2/2022 0935  Last data filed at 8/2/2022 3499  Gross per 24 hour   Intake 840 ml   Output 2290 ml   Net -1450 ml     I/O last 3 completed shifts:   In: 1680 [P O :1440; I V :240]  Out: 2900 [Urine:2900]        Physical Exam:  Vitals:    08/02/22 0727 08/02/22 0854 08/02/22 0856 08/02/22 0859   BP:  99/62 127/65 127/65   BP Location:       Pulse:   85 85   Resp:       Temp:       TempSrc:       SpO2: 96%   97%   Weight:       Height:           GEN: Mancil Day appears well, alert and oriented x 3, pleasant and cooperative   HEENT: NC/AT, moist mucosa, anicteric sclerae; extraocular muscles intact  NECK: supple, no carotid bruits   HEART: regular rhythm, normal S1 and S2, no murmurs, clicks, gallops or rubs, JVP 1-2cm above the clavicle sitting upright   LUNGS: clear to auscultation bilaterally; no wheezes, rales, or rhonchi   ABDOMEN: normal bowel sounds, soft, no tenderness, no distention  EXTREMITIES: peripheral pulses normal; no clubbing, cyanosis; mild mostly nonpitting edema on both lower extremities  NEURO: no focal findings   SKIN: normal without suspicious lesions on exposed skin      Current Facility-Administered Medications:     acetaminophen (TYLENOL) tablet 650 mg, 650 mg, Oral, Q6H PRN, Libra Fuentes MD    albuterol (PROVENTIL HFA,VENTOLIN HFA) inhaler 2 puff, 2 puff, Inhalation, Q4H PRN, Libra Fuentes MD, 2 puff at 08/01/22 1735    aluminum-magnesium hydroxide-simethicone (MYLANTA) oral suspension 30 mL, 30 mL, Oral, Q6H PRN, Libra Fuentes MD    amLODIPine (NORVASC) tablet 5 mg, 5 mg, Oral, Daily, Fernandez Campbell DO, 5 mg at 08/02/22 0857    apixaban (ELIQUIS) tablet 5 mg, 5 mg, Oral, BID, Libra Fuentes MD, 5 mg at 08/02/22 0856    atorvastatin (LIPITOR) tablet 10 mg, 10 mg, Oral, Daily, Libra Fuentes MD, 10 mg at 08/02/22 0856    benzonatate (TESSALON PERLES) capsule 100 mg, 100 mg, Oral, TID, RODRIGUE Messer Sa, 100 mg at 08/02/22 0856    budesonide-formoterol (SYMBICORT) 160-4 5 mcg/act inhaler 2 puff, 2 puff, Inhalation, BID, Yudy Rajput MD, 2 puff at 08/02/22 0900    bumetanide (BUMEX) injection 3 mg, 3 mg, Intravenous, BID (diuretic), Yudy Rajput MD, 3 mg at 08/02/22 0857    insulin lispro (HumaLOG) 100 units/mL subcutaneous injection 1-5 Units, 1-5 Units, Subcutaneous, TID AC, 1 Units at 08/01/22 1205 **AND** Fingerstick Glucose (POCT), , , TID AC, Yudy Rajput MD    insulin lispro (HumaLOG) 100 units/mL subcutaneous injection 1-5 Units, 1-5 Units, Subcutaneous, HS, Yudy Rajput MD, 1 Units at 07/31/22 2103    levalbuterol (XOPENEX) inhalation solution 1 25 mg, 1 25 mg, Nebulization, BID, Cheryle Kumar DO, 1 25 mg at 08/02/22 0726    loratadine (CLARITIN) tablet 10 mg, 10 mg, Oral, Daily, SAM BoneNP, 10 mg at 08/02/22 0857    melatonin tablet 3 mg, 3 mg, Oral, HS, RODRIGUE Bone, 3 mg at 08/01/22 2121    metoprolol succinate (TOPROL-XL) 24 hr tablet 50 mg, 50 mg, Oral, Daily, Yudy Rajput MD, 50 mg at 08/02/22 0856    montelukast (SINGULAIR) tablet 10 mg, 10 mg, Oral, HS, Yudy Rajput MD, 10 mg at 08/01/22 2121    ondansetron (ZOFRAN) injection 4 mg, 4 mg, Intravenous, Q6H PRN, Yudy Rajput MD    polyethylene glycol (MIRALAX) packet 17 g, 17 g, Oral, Daily PRN, Yudy Rajput MD    potassium chloride (K-DUR,KLOR-CON) CR tablet 40 mEq, 40 mEq, Oral, BID, Yudy Rajput MD, 40 mEq at 08/02/22 0856    sitaGLIPtin (JANUVIA) tablet 100 mg, 100 mg, Oral, Daily, Yudy Rajput MD, 100 mg at 08/02/22 0900    sodium chloride 0 9 % inhalation solution 3 mL, 3 mL, Nebulization, BID, Cheryle Kmuar DO, 3 mL at 08/02/22 0726    umeclidinium bromide (INCRUSE ELLIPTA) 62 5 mcg/inh inhaler AEPB 1 puff, 1 puff, Inhalation, Daily, Yudy Rajput MD, 1 puff at 08/02/22 0900      Labs & Results:        Results from last 7 days   Lab Units 08/02/22  0312 07/30/22  0535 07/29/22  1322   WBC Thousand/uL 8 98 8 16 9 60   HEMOGLOBIN g/dL 11 8* 11 7* 12 4   HEMATOCRIT % 37 7 37 2 38 2   PLATELETS Thousands/uL 243 259 324         Results from last 7 days   Lab Units 08/02/22  0300 08/01/22  0435 07/31/22  1204 07/30/22  0535 07/29/22  1322   POTASSIUM mmol/L 4 1 4 0 4 5   < > 3 9   CHLORIDE mmol/L 105 106 102   < > 101   CO2 mmol/L 30 29 29   < > 28   BUN mg/dL 37* 32* 28*   < > 26*   CREATININE mg/dL 1 92* 1 88* 1 94*   < > 1 56*   CALCIUM mg/dL 8 9 8 7 9 4   < > 8 9   ALK PHOS U/L  --   --   --   --  91   ALT U/L  --   --   --   --  13   AST U/L  --   --   --   --  11    < > = values in this interval not displayed  Counseling / Coordination of Care  Total floor / unit time spent today 25 minutes  Greater than 50% of total time was spent with the patient and / or family counseling and / or coordination of care  A description of the counseling / coordination of care: 15  Thank you for the opportunity to participate in the care of this patient      Thayer Hamman, MD  Advanced Heart Failure and Mechanical Circulatory   [address]

## 2022-08-02 NOTE — ASSESSMENT & PLAN NOTE
Lab Results   Component Value Date    HGBA1C 5 7 06/16/2022       Recent Labs     07/31/22  2103 08/01/22  0609 08/01/22  1131 08/01/22  1551   POCGLU 165* 126 172* 113       Blood Sugar Average: Last 72 hrs:  · (P) 141 75 continue outpatient newly  · Fingerstick sliding scale coverage

## 2022-08-02 NOTE — ASSESSMENT & PLAN NOTE
Lab Results   Component Value Date    EGFR 38 08/02/2022    EGFR 39 08/01/2022    EGFR 37 07/31/2022    CREATININE 1 92 (H) 08/02/2022    CREATININE 1 88 (H) 08/01/2022    CREATININE 1 94 (H) 07/31/2022   · B/L 1 6 - 1 70   · strict I/so daily weights  · Avoid nephrotoxins   · Avoid hypotension   · Monitor on IV diuretic

## 2022-08-02 NOTE — ASSESSMENT & PLAN NOTE
Wt Readings from Last 3 Encounters:   08/01/22 (!) 140 kg (308 lb 3 3 oz)   07/28/22 (!) 143 kg (314 lb 12 8 oz)   06/24/22 (!) 142 kg (312 lb 1 6 oz)       · Patient with increased shortness of breath for a week and half  · Positive lower extremity edema, improving   · Patient seen by cardiology in the ED started on Bumex 3 mg IV b i d, to continue today , possibly transition to po in am   · Strict /I/os/ Daily weights   · Fluid restriction 1 8 liter  · Suspect multifactorial dt pt hx Asthma  · Continue bb per HF but started on norvasc no ace dt renal function

## 2022-08-02 NOTE — PROGRESS NOTES
[facility]  Progress Note - Yonas Rivero 1965, 56 y o  male MRN: [id number]  Unit/Bed#: [facility] 510-01 Encounter: [id number]  Primary Care Provider: Jann Jean-Baptiste MD   Date and time admitted to hospital: 7/29/2022 12:35 PM    * Acute on chronic diastolic congestive heart failure [facility])  Assessment & Plan  Wt Readings from Last 3 Encounters:   08/01/22 (!) 140 kg (308 lb 3 3 oz)   07/28/22 (!) 143 kg (314 lb 12 8 oz)   06/24/22 (!) 142 kg (312 lb 1 6 oz)       · Patient with increased shortness of breath for a week and half  · Positive lower extremity edema, improving   · Patient seen by cardiology in the ED started on Bumex 3 mg IV b i d, to continue today , possibly transition to po in am   · Strict /I/os/ Daily weights   · Fluid restriction 1 8 liter  · Suspect multifactorial dt pt hx Asthma  · Continue bb per HF but started on norvasc no ace dt renal function       URI (upper respiratory infection)  Assessment & Plan  Some mild nasal congestion - possibly contributing to productive cough   · Started Claritin for allergies   · Question environmental allergies vs viral   · Added resp protocol pt requesting maybe one tx a day   · Order tessalon pearls for cough TID  , this is helping cough   · Would recommend flutter valve     Moderate persistent asthma  Assessment & Plan  · Patient with history of moderate persistent asthma  · No wheezing on exam has patient will also with lower extremity edema weight gain  · Do not feel this is an asthma exacerbation by represents an exacerbation of congestive heart failure    Stage 3a chronic kidney disease [facility])  Assessment & Plan  Lab Results   Component Value Date    EGFR 39 08/01/2022    EGFR 37 07/31/2022    EGFR 44 07/30/2022    CREATININE 1 88 (H) 08/01/2022    CREATININE 1 94 (H) 07/31/2022    CREATININE 1 70 (H) 07/30/2022   · B/L 1 6 - 1 70   · strict I/so daily weights  · Avoid nephrotoxins   · Avoid hypotension · Continue iv bumex and trend renal function   ·     Morbid obesity ([address] )  Assessment & Plan  · Morbid obesity noted  · Dietary maneuvers discussed    Type 2 diabetes mellitus with hyperglycemia [facility])  Assessment & Plan  Lab Results   Component Value Date    HGBA1C 5 7 2022       Recent Labs     22  2103 22  0609 22  1131 22  1551   POCGLU 165* 126 172* 113       Blood Sugar Average: Last 72 hrs:  · (P) 141 75 continue outpatient newly  · Fingerstick sliding scale coverage    Essential hypertension  Assessment & Plan  · Continue Toprol XL 50 mg daily        VTE Pharmacologic Prophylaxis:   High Risk (Score >/= 5) - Pharmacological DVT Prophylaxis Ordered: apixaban (Eliquis)  Sequential Compression Devices Ordered  Patient Centered Rounds: I performed bedside rounds with nursing staff today  Discussions with Specialists or Other Care Team Provider: nursing     Education and Discussions with Family / Patient: Patient declined call to   Time Spent for Care: 45 minutes  More than 50% of total time spent on counseling and coordination of care as described above  Current Length of Stay: 3 day(s)  Current Patient Status: Inpatient   Certification Statement: The patient will continue to require additional inpatient hospital stay due to ongoing iv diuresis   Discharge Plan: Anticipate discharge tomorrow to home  if able to transition to po diuretics per HF     Code Status: Level 1 - Full Code    Subjective:   Pt is doing better today managed to sleep through the night  Less sob cough improving too    slept well    Objective:     Vitals:   Temp (24hrs), Av 8 °F (36 6 °C), Min:97 6 °F (36 4 °C), Max:98 2 °F (36 8 °C)    Temp:  [97 6 °F (36 4 °C)-98 2 °F (36 8 °C)] 97 6 °F (36 4 °C)  HR:  [62-78] 78  Resp:  [17-18] 18  BP: (112-132)/(62-74) 126/72  SpO2:  [95 %-100 %] 99 %  Body mass index is 40 66 kg/m²  Input and Output Summary (last 24 hours):      Intake/Output Summary (Last 24 hours) at 8/1/2022 2030  Last data filed at 8/1/2022 2022  Gross per 24 hour   Intake 960 ml   Output 2425 ml   Net -1465 ml       Physical Exam:   Physical Exam  Constitutional:       General: He is not in acute distress  Appearance: He is obese  He is not ill-appearing, toxic-appearing or diaphoretic  HENT:      Head: Normocephalic and atraumatic  Nose: No congestion  Mouth/Throat:      Pharynx: Oropharynx is clear  Eyes:      General:         Right eye: No discharge  Left eye: No discharge  Conjunctiva/sclera: Conjunctivae normal    Cardiovascular:      Rate and Rhythm: Normal rate  Heart sounds: No murmur heard  No friction rub  No gallop  Pulmonary:      Effort: No respiratory distress  Breath sounds: No stridor  Rales present  No wheezing or rhonchi  Chest:      Chest wall: No tenderness  Abdominal:      General: There is no distension  Palpations: There is no mass  Tenderness: There is no abdominal tenderness  There is no guarding or rebound  Hernia: No hernia is present  Musculoskeletal:         General: No swelling, tenderness, deformity or signs of injury  Right lower leg: No edema  Left lower leg: No edema  Skin:     Coloration: Skin is not jaundiced or pale  Findings: No bruising, erythema, lesion or rash  Neurological:      Mental Status: He is alert and oriented to person, place, and time     Psychiatric:         Behavior: Behavior normal           Additional Data:     Labs:  Results from last 7 days   Lab Units 07/30/22  0535 07/29/22  1322   WBC Thousand/uL 8 16 9 60   HEMOGLOBIN g/dL 11 7* 12 4   HEMATOCRIT % 37 2 38 2   PLATELETS Thousands/uL 259 324   NEUTROS PCT %  --  61   LYMPHS PCT %  --  17   MONOS PCT %  --  9   EOS PCT %  --  11*     Results from last 7 days   Lab Units 08/01/22  0435 07/30/22  0535 07/29/22  1322   SODIUM mmol/L 139   < > 136   POTASSIUM mmol/L 4 0   < > 3 9   CHLORIDE mmol/L 106   < > 101   CO2 mmol/L 29   < > 28   BUN mg/dL 32*   < > 26*   CREATININE mg/dL 1 88*   < > 1 56*   ANION GAP mmol/L 4   < > 7   CALCIUM mg/dL 8 7   < > 8 9   ALBUMIN g/dL  --   --  3 2*   TOTAL BILIRUBIN mg/dL  --   --  0 42   ALK PHOS U/L  --   --  91   ALT U/L  --   --  13   AST U/L  --   --  11   GLUCOSE RANDOM mg/dL 109   < > 139    < > = values in this interval not displayed  Results from last 7 days   Lab Units 08/01/22  1551 08/01/22  1131 08/01/22  0609 07/31/22  2103 07/31/22  1650 07/31/22  1058 07/31/22  0634 07/30/22  2118 07/30/22  1624 07/30/22  1106 07/30/22  0613 07/29/22  2048   POC GLUCOSE mg/dl 113 172* 126 165* 131 143* 125 158* 145* 150* 139 134               Lines/Drains:  Invasive Devices  Report    Peripheral Intravenous Line  Duration           Peripheral IV 07/31/22 Right Forearm 1 day                  Telemetry:  Telemetry Orders (From admission, onward)             48 Hour Telemetry Monitoring  Continuous x 48 hours        References:    Telemetry Guidelines   Question:  Reason for 48 Hour Telemetry  Answer:  Arrhythmias Requiring Medical Therapy (eg  SVT, Vtach/fib, Bradycardia, Uncontrolled A-fib)                 Telemetry Reviewed: Normal Sinus Rhythm  Indication for Continued Telemetry Use: No indication for continued use  Will discontinue  Imaging: No pertinent imaging reviewed      Recent Cultures (last 7 days):         Last 24 Hours Medication List:   Current Facility-Administered Medications   Medication Dose Route Frequency Provider Last Rate    acetaminophen  650 mg Oral Q6H PRN Mayra Gilbert MD      albuterol  2 puff Inhalation Q4H PRN Mayra Gilbert MD      aluminum-magnesium hydroxide-simethicone  30 mL Oral Q6H PRN Mayra Gilbert MD      amLODIPine  5 mg Oral Daily Win Salvador, DO      apixaban  5 mg Oral BID Mayra Gilbert MD      atorvastatin  10 mg Oral Daily Mayra Gilbert MD      benzonatate  100 mg Oral TID Arianna Ricardo RODRIGUE Avila      budesonide-formoterol  2 puff Inhalation BID Richie William MD      bumetanide  3 mg Intravenous BID (diuretic) Richie William MD      insulin lispro  1-5 Units Subcutaneous TID AC Richie William MD      insulin lispro  1-5 Units Subcutaneous HS Richie William MD      levalbuterol  1 25 mg Nebulization BID Hetul Kumar, DO      loratadine  10 mg Oral Daily Tyler Soulier, CRNP      melatonin  3 mg Oral HS Tyler Soulier, CRNP      metoprolol succinate  50 mg Oral Daily Richie William MD      montelukast  10 mg Oral HS Richie William MD      ondansetron  4 mg Intravenous Q6H PRN Richie William MD      polyethylene glycol  17 g Oral Daily PRN Richie William MD      potassium chloride  40 mEq Oral BID Richie William MD      sitaGLIPtin  100 mg Oral Daily Richie William MD      sodium chloride  3 mL Nebulization BID Cheryle Kumar DO      umeclidinium bromide  1 puff Inhalation Daily Richie William MD          Today, Patient Was Seen By: Tyler Soulier, CRNP    **Please Note: This note may have been constructed using a voice recognition system  **

## 2022-08-02 NOTE — ASSESSMENT & PLAN NOTE
Lab Results   Component Value Date    EGFR 39 08/01/2022    EGFR 37 07/31/2022    EGFR 44 07/30/2022    CREATININE 1 88 (H) 08/01/2022    CREATININE 1 94 (H) 07/31/2022    CREATININE 1 70 (H) 07/30/2022   · B/L 1 6 - 1 70   · strict I/so daily weights  · Avoid nephrotoxins   · Avoid hypotension   · Continue iv bumex and trend renal function   ·

## 2022-08-03 ENCOUNTER — TRANSITIONAL CARE MANAGEMENT (OUTPATIENT)
Dept: FAMILY MEDICINE CLINIC | Facility: CLINIC | Age: 57
End: 2022-08-03

## 2022-08-03 VITALS
OXYGEN SATURATION: 97 % | HEIGHT: 73 IN | RESPIRATION RATE: 16 BRPM | TEMPERATURE: 98.1 F | DIASTOLIC BLOOD PRESSURE: 85 MMHG | WEIGHT: 307.76 LBS | SYSTOLIC BLOOD PRESSURE: 134 MMHG | BODY MASS INDEX: 40.79 KG/M2 | HEART RATE: 72 BPM

## 2022-08-03 LAB
ANION GAP SERPL CALCULATED.3IONS-SCNC: 3 MMOL/L (ref 4–13)
BUN SERPL-MCNC: 41 MG/DL (ref 5–25)
CALCIUM SERPL-MCNC: 8.8 MG/DL (ref 8.3–10.1)
CHLORIDE SERPL-SCNC: 105 MMOL/L (ref 96–108)
CO2 SERPL-SCNC: 29 MMOL/L (ref 21–32)
CREAT SERPL-MCNC: 1.84 MG/DL (ref 0.6–1.3)
GFR SERPL CREATININE-BSD FRML MDRD: 40 ML/MIN/1.73SQ M
GLUCOSE SERPL-MCNC: 120 MG/DL (ref 65–140)
GLUCOSE SERPL-MCNC: 123 MG/DL (ref 65–140)
GLUCOSE SERPL-MCNC: 140 MG/DL (ref 65–140)
POTASSIUM SERPL-SCNC: 4.3 MMOL/L (ref 3.5–5.3)
SODIUM SERPL-SCNC: 137 MMOL/L (ref 135–147)

## 2022-08-03 PROCEDURE — 82948 REAGENT STRIP/BLOOD GLUCOSE: CPT

## 2022-08-03 PROCEDURE — 80048 BASIC METABOLIC PNL TOTAL CA: CPT | Performed by: HOSPITALIST

## 2022-08-03 PROCEDURE — 94640 AIRWAY INHALATION TREATMENT: CPT

## 2022-08-03 PROCEDURE — 99232 SBSQ HOSP IP/OBS MODERATE 35: CPT | Performed by: INTERNAL MEDICINE

## 2022-08-03 PROCEDURE — 99239 HOSP IP/OBS DSCHRG MGMT >30: CPT | Performed by: HOSPITALIST

## 2022-08-03 PROCEDURE — 94760 N-INVAS EAR/PLS OXIMETRY 1: CPT

## 2022-08-03 RX ORDER — AMLODIPINE BESYLATE 5 MG/1
5 TABLET ORAL DAILY
Qty: 30 TABLET | Refills: 0 | Status: ON HOLD | OUTPATIENT
Start: 2022-08-04 | End: 2022-09-07

## 2022-08-03 RX ORDER — BUMETANIDE 1 MG/1
3 TABLET ORAL 2 TIMES DAILY
Qty: 180 TABLET | Refills: 0 | Status: SHIPPED | OUTPATIENT
Start: 2022-08-03 | End: 2022-09-19 | Stop reason: SDUPTHER

## 2022-08-03 RX ADMIN — ATORVASTATIN CALCIUM 10 MG: 10 TABLET, FILM COATED ORAL at 08:53

## 2022-08-03 RX ADMIN — LEVALBUTEROL HYDROCHLORIDE 1.25 MG: 1.25 SOLUTION, CONCENTRATE RESPIRATORY (INHALATION) at 07:10

## 2022-08-03 RX ADMIN — METOPROLOL SUCCINATE 50 MG: 50 TABLET, EXTENDED RELEASE ORAL at 08:53

## 2022-08-03 RX ADMIN — LORATADINE 10 MG: 10 TABLET ORAL at 08:53

## 2022-08-03 RX ADMIN — POTASSIUM CHLORIDE 40 MEQ: 1500 TABLET, EXTENDED RELEASE ORAL at 08:53

## 2022-08-03 RX ADMIN — SITAGLIPTIN 100 MG: 100 TABLET, FILM COATED ORAL at 08:53

## 2022-08-03 RX ADMIN — ISODIUM CHLORIDE 3 ML: 0.03 SOLUTION RESPIRATORY (INHALATION) at 07:10

## 2022-08-03 RX ADMIN — AMLODIPINE BESYLATE 5 MG: 5 TABLET ORAL at 08:53

## 2022-08-03 RX ADMIN — APIXABAN 5 MG: 5 TABLET, FILM COATED ORAL at 08:53

## 2022-08-03 RX ADMIN — BENZONATATE 100 MG: 100 CAPSULE ORAL at 08:53

## 2022-08-03 RX ADMIN — ALBUTEROL SULFATE 2 PUFF: 90 AEROSOL, METERED RESPIRATORY (INHALATION) at 02:47

## 2022-08-03 RX ADMIN — BUDESONIDE AND FORMOTEROL FUMARATE DIHYDRATE 2 PUFF: 160; 4.5 AEROSOL RESPIRATORY (INHALATION) at 08:53

## 2022-08-03 RX ADMIN — UMECLIDINIUM 1 PUFF: 62.5 AEROSOL, POWDER ORAL at 08:53

## 2022-08-03 RX ADMIN — BUMETANIDE 3 MG: 2 TABLET ORAL at 08:53

## 2022-08-03 NOTE — PROGRESS NOTES
Heart Failure/ Pulmonary Hypertension Progress Note - Alba Orta 56 y o  male MRN: [id number]    Unit/Bed#: [facility] 510-01 Encounter: [id number]      Assessment:    Principal Problem:    Acute on chronic diastolic congestive heart failure (HCC)  Active Problems:    Essential hypertension    Type 2 diabetes mellitus with hyperglycemia (HCC)    Morbid obesity (HCC)    Moderate persistent asthma    Stage 3a chronic kidney disease (HCC)    URI (upper respiratory infection)      Subjective:   Patient seen and examined  No significant events overnight  Still mildly orthopneic with TELLES but with improved PA pressures per Cardiomems transmission    Objective: Intake/ Output: 580/2600/-2 L  Weight: 307 down from 311 on admit  Tele: SR    Acute on chronic HFpEF  Diuresed with Bumex 3 mg IV BID  Transitioned to oral regimen with good response  Ambulate with pulse ox this AM  Possible discharge later today with close outpatient follow up  Needs to restart CPAP, has not been using due to recall  CardioMems Monitoring 8/3//22 :PAD 15 mmHg             Lab Results   Component Value Date     NTBNP 619 (H) 11/09/2021     RHC 3/9/22:    RA 4    RV 35/4    PA 35/12/21    PCWP 10    PA sat 64%    Travis CO 5 56 L/min    Travis CI 2 2L/min/m2    PVR 1 97 SAGASTUME      TTE 11/12/21: LVEF 55%, LVIDd 6 cm,  grade I DD, RV normal, basal inferior and inferolateral walls hypokinetic, trace TR  TTE 7/20201: LVEF 50%, LVIDd 6 1 cm,  no RWMA, akinesis of basal inferior and basal mid inferolateral walls, trace MR, trace TR, IVC mildly dilated, RV normal  TTE 3/2020: LVEF 40-45%, LVIDd 6 12 cm,  RV normal, no MR/TR, no effusions,     Atrial fibrillation    [id number] = 3 (HF, HTN, DM)      Diagnosed 02/2022     Anticoagulation on Eliquis      Rate control: metoprolol as above  Rhythm control: No      HTN  Type II DM  A1C 5 7 %  HLD  FLP 10/1/21: , TG 90, HDL 66, LDL 73   On atorvastatin 10 mg daily  Chronic respiratory insufficiency  -PFTs 7/19/21:   Interpretation:  Severe obstructive airflow defect on spirometry  Significant improvement in airflow or forced vital capacity in response to the administration of bronchodilator per ATS standards  Air trapping as indicated by the lung volumes  Mild decrease in diffusion capacity  Flow-volume loop consistent with obstruction     CKD  Baseline creat around 1 6  1 8 today  Moderate persistent asthma  Significant expiratory wheezes on exam today with need for inhaler q 4 hours,  In possible mild exacerbation  Followed by pulm  Morbid obesity  VICKY  CPAP on recall  Working on getting new equipment  Tobacco abuse  Occasional cigar  Quit 1 year ago           Review of Systems   All other systems reviewed and are negative  [employer] (day, reason): Cotton catheter (day, reason):    Vitals: Blood pressure 130/73, pulse 90, temperature 97 5 °F (36 4 °C), resp  rate 16, height 6' 1" (1 854 m), weight (!) 140 kg (307 lb 12 2 oz), SpO2 97 %  , Body mass index is 40 6 kg/m² , I/O last 3 completed shifts: In: 820 [P O :820]  Out: 3150 [Urine:3150]  No intake/output data recorded  Wt Readings from Last 3 Encounters:   08/03/22 (!) 140 kg (307 lb 12 2 oz)   07/28/22 (!) 143 kg (314 lb 12 8 oz)   06/24/22 (!) 142 kg (312 lb 1 6 oz)       Intake/Output Summary (Last 24 hours) at 8/3/2022 0853  Last data filed at 8/3/2022 0247  Gross per 24 hour   Intake 580 ml   Output 2300 ml   Net -1720 ml     I/O last 3 completed shifts: In: 36 [P O :820]  Out: 3150 [Urine:3150]    No significant arrhythmias seen on telemetry review         Physical Exam:  Vitals:    08/03/22 0217 08/03/22 0600 08/03/22 0711 08/03/22 0746   BP: 105/55   130/73   BP Location:       Pulse: 72   90   Resp:       Temp: 97 7 °F (36 5 °C)   97 5 °F (36 4 °C)   TempSrc:       SpO2: 96%  100% 97%   Weight:  (!) 140 kg (307 lb 12 2 oz)     Height:           GEN: Deepthi Garcia appears well, alert and oriented x 3, pleasant and cooperative   HEENT: pupils equal, round, and reactive to light; extraocular muscles intact  NECK: supple, no carotid bruits   HEART: regular rhythm, normal S1 and S2, no murmurs, clicks, gallops or rubs, JVP is up   LUNGS: clear to auscultation bilaterally; no wheezes, rales, or rhonchi   ABDOMEN: normal bowel sounds, soft, no tenderness, no distention  EXTREMITIES: peripheral pulses normal; no clubbing, cyanosis, trace BLLE  edema  NEURO: no focal findings   SKIN: normal without suspicious lesions on exposed skin      Current Facility-Administered Medications:     acetaminophen (TYLENOL) tablet 650 mg, 650 mg, Oral, Q6H PRN, Jefe Recio MD    albuterol (PROVENTIL HFA,VENTOLIN HFA) inhaler 2 puff, 2 puff, Inhalation, Q4H PRN, Jefe Recio MD, 2 puff at 08/03/22 0247    aluminum-magnesium hydroxide-simethicone (MYLANTA) oral suspension 30 mL, 30 mL, Oral, Q6H PRN, Jefe Recio MD    amLODIPine (NORVASC) tablet 5 mg, 5 mg, Oral, Daily, Pretty Melchor DO, 5 mg at 08/02/22 0857    apixaban (ELIQUIS) tablet 5 mg, 5 mg, Oral, BID, Jefe Recio MD, 5 mg at 08/02/22 1702    atorvastatin (LIPITOR) tablet 10 mg, 10 mg, Oral, Daily, Jefe Recio MD, 10 mg at 08/02/22 0856    benzonatate (TESSALON PERLES) capsule 100 mg, 100 mg, Oral, TID, RODRIGUE Merrill, 100 mg at 08/02/22 2114    budesonide-formoterol (SYMBICORT) 160-4 5 mcg/act inhaler 2 puff, 2 puff, Inhalation, BID, Jefe Recio MD, 2 puff at 08/02/22 1707    bumetanide (BUMEX) tablet 3 mg, 3 mg, Oral, BID (diuretic), Marge Archuleta MD, 3 mg at 08/02/22 1703    insulin lispro (HumaLOG) 100 units/mL subcutaneous injection 1-5 Units, 1-5 Units, Subcutaneous, TID AC, 1 Units at 08/01/22 1205 **AND** Fingerstick Glucose (POCT), , , TID AC, Servando Huston MD    insulin lispro (HumaLOG) 100 units/mL subcutaneous injection 1-5 Units, 1-5 Units, Subcutaneous, HS, Jefe Recio MD, 1 Units at 07/31/22 2103    levalbuterol (XOPENEX) inhalation solution 1 25 mg, 1 25 mg, Nebulization, BID, Hetul Kumar, DO, 1 25 mg at 08/03/22 0710    loratadine (CLARITIN) tablet 10 mg, 10 mg, Oral, Daily, SAM KennedyNP, 10 mg at 08/02/22 0857    melatonin tablet 3 mg, 3 mg, Oral, HS, Cruz Joy, CRNP, 3 mg at 08/02/22 2114    metoprolol succinate (TOPROL-XL) 24 hr tablet 50 mg, 50 mg, Oral, Daily, Lorenza Rogers MD, 50 mg at 08/02/22 0856    montelukast (SINGULAIR) tablet 10 mg, 10 mg, Oral, HS, Lorenza Rogers MD, 10 mg at 08/02/22 2114    ondansetron (ZOFRAN) injection 4 mg, 4 mg, Intravenous, Q6H PRN, Lorenza Rogers MD    polyethylene glycol (MIRALAX) packet 17 g, 17 g, Oral, Daily PRN, Lorenza Rogers MD    potassium chloride (K-DUR,KLOR-CON) CR tablet 40 mEq, 40 mEq, Oral, BID, Lorenza Rogers MD, 40 mEq at 08/02/22 1703    sitaGLIPtin (JANUVIA) tablet 100 mg, 100 mg, Oral, Daily, Jemma Huston MD, 100 mg at 08/02/22 0900    sodium chloride 0 9 % inhalation solution 3 mL, 3 mL, Nebulization, BID, Cheryle Kumar DO, 3 mL at 08/03/22 0710    umeclidinium bromide (INCRUSE ELLIPTA) 62 5 mcg/inh inhaler AEPB 1 puff, 1 puff, Inhalation, Daily, Lorenza Rogers MD, 1 puff at 08/02/22 0900      Labs & Results:        Results from last 7 days   Lab Units 08/02/22  0312 07/30/22  0535 07/29/22  1322   WBC Thousand/uL 8 98 8 16 9 60   HEMOGLOBIN g/dL 11 8* 11 7* 12 4   HEMATOCRIT % 37 7 37 2 38 2   PLATELETS Thousands/uL 243 259 324         Results from last 7 days   Lab Units 08/03/22  0442 08/02/22  0300 08/01/22  0435 07/30/22  0535 07/29/22  1322   POTASSIUM mmol/L 4 3 4 1 4 0   < > 3 9   CHLORIDE mmol/L 105 105 106   < > 101   CO2 mmol/L 29 30 29   < > 28   BUN mg/dL 41* 37* 32*   < > 26*   CREATININE mg/dL 1 84* 1 92* 1 88*   < > 1 56*   CALCIUM mg/dL 8 8 8 9 8 7   < > 8 9   ALK PHOS U/L  --   --   --   --  91   ALT U/L  --   --   --   --  13   AST U/L  --   --   --   --  11    < > = values in this interval not displayed             Counseling / Coordination of Care  Total floor / unit time spent today 20 minutes  Greater than 50% of total time was spent with the patient and / or family counseling and / or coordination of care  A description of the counseling / coordination of care: 20  Thank you for the opportunity to participate in the care of this patient  Capri Villa Erps

## 2022-08-03 NOTE — CASE MANAGEMENT
Case Management Discharge Planning Note    Patient name Johnnie Canales  [facility] MRN [id number]  : 1965 Date 8/3/2022       Current Admission Date: 2022  Current Admission Diagnosis:Acute on chronic diastolic congestive heart failure [facility])   Patient Active Problem List    Diagnosis Date Noted    URI (upper respiratory infection) 2022    Leg cramps 2022    [facility]oxysmal atrial fibrillation ([facility] ) 2022    Stage 3a chronic kidney disease ([facility] ) 2021    Hypokalemia 2021    Acute on chronic diastolic congestive heart failure ([facility] ) 2021    Moderate persistent asthma 10/11/2021    Dyspnea on exertion 10/11/2021    Cardi[facility] 2021    Well adult exam 2021    Bilateral leg edema 2020    Edema of both feet 2020    VICKY (obstructive sleep apnea)     Daytime sleepiness 2019    Mixed hyperlipidemia 2019    Morbid obesity ([facility] ) 2019    Vitamin B12 deficiency 2019    Vitamin D deficiency 2019    Knee sprain, bilateral 2018    Primary osteoarthritis of both knees 2018    Irritable bowel syndrome with diarrhea 2018    Essential hypertension 2018    Type 2 diabetes mellitus with hyperglycemia ([facility] ) 2018      LOS (days): 5  Geometric Mean LOS (GMLOS) (days):   Days to GMLOS:     OBJECTIVE:  Risk of Unplanned Readmission Score: 28 74         Current admission status: Inpatient   Preferred Pharmacy:   [address]  [address] [facility]  [address] [facility] [address]  Phone: [phone] Fax: [phone]    [id number] - [address] [facility]  [address]  [address] [place] [address]  Phone: [phone] Fax: [phone]    Primary Care Provider: Vinny Hernandez MD    Primary Insurance: [address] [place] [address]  Secondary Insurance:     DISCHARGE DETAILS:    Discharge planning discussed with[de-identified] Patient        CM contacted family/caregiver?: No- see comments  Were Treatment Team discharge recommendations reviewed with patient/caregiver?: Yes  Did patient/caregiver verbalize understanding of patient care needs?: Yes  Were patient/caregiver advised of the risks associated with not following Treatment Team discharge recommendations?: Yes                   Other Referral/Resources/Interventions Provided:  Interventions: Transportation  Referral Comments: Pt denies any homecare needs  pt requested transport to home, called [facility] and spoke with Kenneth and requested shuttle transport to home  pt escorted to [address] [place] [address] for transport with all belongings           Treatment Team Recommendation: Home  Discharge Destination Plan[de-identified] Home  Transport at Discharge : [facility]

## 2022-08-03 NOTE — TELEPHONE ENCOUNTER
Fax received from I  Completed  Dr Leann Calvin in office this PM, will have her sign and fax back to I  Will scan into chart once all completed

## 2022-08-04 ENCOUNTER — PATIENT OUTREACH (OUTPATIENT)
Dept: FAMILY MEDICINE CLINIC | Facility: CLINIC | Age: 57
End: 2022-08-04

## 2022-08-04 DIAGNOSIS — Z71.89 COMPLEX CARE COORDINATION: Primary | ICD-10-CM

## 2022-08-04 NOTE — UTILIZATION REVIEW
Notification of Discharge   This is a Notification of Discharge from our facility [address]  Please be advised that this patient has been discharge from our facility  Below you will find the admission and discharge date and time including the patients disposition  UTILIZATION REVIEW CONTACT:  Perla Melendez  Utilization   Network Utilization Review Department  Phone: [phone] x carefully listen to the prompts  All voicemails are confidential   Email: [email or web address]     PHYSICIAN ADVISORY SERVICES:  FOR [id number] REVIEW - MEDICAL NECESSITY DENIAL  Phone: [phone]  Fax: [phone]  Email: [email or web address]     PRESENTATION DATE: 7/29/2022 12:35 PM  OBERVATION ADMISSION DATE:   INPATIENT ADMISSION DATE: 7/29/22  2:27 PM   DISCHARGE DATE: 8/3/2022  2:56 PM  DISPOSITION: Home/Self Care Home/Self Care      IMPORTANT INFORMATION:  Send all requests for admission clinical reviews, approved or denied determinations and any other requests to dedicated fax number below belonging to the campus where the patient is receiving treatment   List of dedicated fax numbers:  [address] DENIALS (Administrative/Medical Necessity) [phone]   [address] (Maternity/NICU/Pediatrics) [phone]   Esequiel Boots [phone]   [address] [place] [address] [phone]   [address] [place] [address] [phone]   2000 [facility] [address] [place] [address],[address] [place] [address] [address] [place] [address] [phone]   [facility]  [phone]   220 [facility]  [address] [phone]

## 2022-08-04 NOTE — PROGRESS NOTES
Charu Prescott returned my phone call  He is doing well  Has all of his medications and is taking them  No issue with cost  PCP appointment 8/8/22  Weighing daily  No question about discharge instructions   Does not feel I need to call back

## 2022-08-06 NOTE — ASSESSMENT & PLAN NOTE
Lab Results   Component Value Date    EGFR 40 08/03/2022    EGFR 38 08/02/2022    EGFR 39 08/01/2022    CREATININE 1 84 (H) 08/03/2022    CREATININE 1 92 (H) 08/02/2022    CREATININE 1 88 (H) 08/01/2022   · B/L 1 6 - 1 70   · strict I/so daily weights  · Avoid nephrotoxins   · Avoid hypotension   · Monitor on IV diuretic

## 2022-08-06 NOTE — DISCHARGE SUMMARY
[address] [facility]  Discharge- Darrell Drake 1965, 56 y o  male MRN: [id number]  Unit/Bed#: [facility] 510-01 Encounter: [id number]  Primary Care Provider: Melissa Lemos MD   Date and time admitted to hospital: 7/29/2022 12:35 PM    * Acute on chronic diastolic congestive heart failure [facility])  Assessment & Plan  Wt Readings from Last 3 Encounters:   08/03/22 (!) 140 kg (307 lb 12 2 oz)   07/28/22 (!) 143 kg (314 lb 12 8 oz)   06/24/22 (!) 142 kg (312 lb 1 6 oz)       · Patient with increased shortness of breath for a week and half  · Positive lower extremity edema, improving   · Patient seen by cardiology in the ED started on Bumex 3 mg IV b i d, switch to PO Bumex 3 mg BID & watched response (home regimen - PO bumex 2 mg QAM & 1 mg QPM)  · Did well, hence cleared by cardio to go home on PO bumex 3 mg BID with OP f/u  · Strict /I/os/ Daily weights   · Fluid restriction 1 8 liter  · Continue bb per HF but started on norvasc no ace dt renal function       URI (upper respiratory infection)  Assessment & Plan  Some mild nasal congestion - possibly contributing to productive cough   · Started Claritin for allergies   · Question environmental allergies vs viral   · Added resp protocol pt requesting maybe one tx a day   · Order tessalon pearls for cough TID  , this is helping cough     Stage 3a chronic kidney disease [facility])  Assessment & Plan  Lab Results   Component Value Date    EGFR 40 08/03/2022    EGFR 38 08/02/2022    EGFR 39 08/01/2022    CREATININE 1 84 (H) 08/03/2022    CREATININE 1 92 (H) 08/02/2022    CREATININE 1 88 (H) 08/01/2022   · B/L 1 6 - 1 70   · strict I/so daily weights  · Avoid nephrotoxins   · Avoid hypotension   · Monitor on IV diuretic    Moderate persistent asthma  Assessment & Plan  · Patient with history of moderate persistent asthma  · Not in acute exacerbation    Morbid obesity ([address] )  Assessment & Plan  · Morbid obesity noted  · Dietary maneuvers discussed    Type 2 diabetes mellitus with hyperglycemia [facility])  Assessment & Plan  Lab Results   Component Value Date    HGBA1C 5 7 06/16/2022       No results for input(s): POCGLU in the last 72 hours  Blood Sugar Average: Last 72 hrs:  · (P) 130   · continue outpatient januvia  · Fingerstick sliding scale coverage    Essential hypertension  Assessment & Plan  · Continue Toprol XL 50 mg daily, norvasc 5 mg QD        Medical Problems             Resolved Problems  Date Reviewed: 8/6/2022   None               Discharging Physician / Practitioner: Nick Cary MD  PCP: Noelle Ni MD  Admission Date:   Admission Orders (From admission, onward)     Ordered        07/29/22 [facility]  Once                      Discharge Date: 08/06/22    Consultations During Hospital Stay:  · Heart failure service    Procedures Performed:   none    Significant Findings / Test Results:   XR chest 1 view portable   Final Result by Aliya Webb MD (07/29 4343)      No acute cardiopulmonary disease  Findings are stable            Workstation performed: [id number]                 Reason for Admission: SOB    Hospital Course:   Leanne Beck is a 56 y o  male patient who originally presented to the hospital on 7/29/2022 with a PMH of heart failure preserved ejection fraction, type 2 diabetes and moderate persistent asthma who presents with increasing shortness of breath over the last 1 and half weeks  Patient reports 1/2 weeks of increasing shortness of breath  Also notable some PND  Positive edema with at least 4 lb weight gain  Was seen by Cardiology on outpatient basis day before receive IV Lasix 100 mg  Patient also has a cardiomems device implanted  Reported increasing pressures in previous week  Admitted for CHF exacerbation    Please see above list of diagnoses and related plan for additional information       Condition at Discharge: stable    Discharge Day Visit / Exam:   Subjective: Feels well, has no complains    Vitals: Blood Pressure: 134/85 (08/03/22 1126)  Pulse: 72 (08/03/22 1126)  Temperature: 98 1 °F (36 7 °C) (08/03/22 1126)  Temp Source: Oral (08/02/22 1452)  Respirations: 16 (08/02/22 1452)  Height: 6' 1" (185 4 cm) (07/29/22 1810)  Weight - Scale: (!) 140 kg (307 lb 12 2 oz) (08/03/22 0600)  SpO2: 97 % (08/03/22 1126)  Exam:   Physical Exam  Vitals reviewed  HENT:      Head: Normocephalic and atraumatic  Mouth/Throat:      Mouth: Mucous membranes are moist    Cardiovascular:      Rate and Rhythm: Normal rate and regular rhythm  Heart sounds: Normal heart sounds  Pulmonary:      Effort: Pulmonary effort is normal  No respiratory distress  Breath sounds: Normal breath sounds  Abdominal:      General: There is no distension  Palpations: Abdomen is soft  Tenderness: There is no abdominal tenderness  Musculoskeletal:      Right lower leg: No edema  Left lower leg: No edema  Neurological:      Mental Status: He is alert and oriented to person, place, and time  Discharge instructions/Information to patient and family:   See after visit summary for information provided to patient and family  Provisions for Follow-Up Care:  See after visit summary for information related to follow-up care and any pertinent home health orders  Disposition:   Home    Planned Readmission: no     Discharge Statement:  I spent 45 minutes discharging the patient  This time was spent on the day of discharge  I had direct contact with the patient on the day of discharge  Greater than 50% of the total time was spent examining patient, answering all patient questions, arranging and discussing plan of care with patient as well as directly providing post-discharge instructions  Additional time then spent on discharge activities  Discharge Medications:  See after visit summary for reconciled discharge medications provided to patient and/or family  **Please Note: This note may have been constructed using a voice recognition system**

## 2022-08-06 NOTE — ASSESSMENT & PLAN NOTE
Wt Readings from Last 3 Encounters:   08/03/22 (!) 140 kg (307 lb 12 2 oz)   07/28/22 (!) 143 kg (314 lb 12 8 oz)   06/24/22 (!) 142 kg (312 lb 1 6 oz)       · Patient with increased shortness of breath for a week and half  · Positive lower extremity edema, improving   · Patient seen by cardiology in the ED started on Bumex 3 mg IV b i d, switch to PO Bumex 3 mg BID & watched response (home regimen - PO bumex 2 mg QAM & 1 mg QPM)  · Did well, hence cleared by cardio to go home on PO bumex 3 mg BID with OP f/u  · Strict /I/os/ Daily weights   · Fluid restriction 1 8 liter  · Continue bb per HF but started on norvasc no ace dt renal function

## 2022-08-06 NOTE — ASSESSMENT & PLAN NOTE
Some mild nasal congestion - possibly contributing to productive cough   · Started Claritin for allergies   · Question environmental allergies vs viral   · Added resp protocol pt requesting maybe one tx a day   · Order tessalon pearls for cough TID  , this is helping cough

## 2022-08-06 NOTE — ASSESSMENT & PLAN NOTE
Lab Results   Component Value Date    HGBA1C 5 7 06/16/2022       No results for input(s): POCGLU in the last 72 hours      Blood Sugar Average: Last 72 hrs:  · (P) 130   · continue outpatient januvia  · Fingerstick sliding scale coverage

## 2022-08-08 ENCOUNTER — OFFICE VISIT (OUTPATIENT)
Dept: FAMILY MEDICINE CLINIC | Facility: CLINIC | Age: 57
End: 2022-08-08
Payer: COMMERCIAL

## 2022-08-08 VITALS
BODY MASS INDEX: 42.26 KG/M2 | WEIGHT: 312 LBS | HEIGHT: 72 IN | SYSTOLIC BLOOD PRESSURE: 132 MMHG | HEART RATE: 88 BPM | RESPIRATION RATE: 20 BRPM | TEMPERATURE: 98.3 F | DIASTOLIC BLOOD PRESSURE: 84 MMHG | OXYGEN SATURATION: 96 %

## 2022-08-08 DIAGNOSIS — E11.22 TYPE 2 DIABETES MELLITUS WITH STAGE 3B CHRONIC KIDNEY DISEASE, WITHOUT LONG-TERM CURRENT USE OF INSULIN (HCC): ICD-10-CM

## 2022-08-08 DIAGNOSIS — N18.31 STAGE 3A CHRONIC KIDNEY DISEASE (HCC): ICD-10-CM

## 2022-08-08 DIAGNOSIS — E66.01 MORBID OBESITY (HCC): ICD-10-CM

## 2022-08-08 DIAGNOSIS — I50.33 ACUTE ON CHRONIC DIASTOLIC CONGESTIVE HEART FAILURE (HCC): Primary | ICD-10-CM

## 2022-08-08 DIAGNOSIS — E78.2 MIXED HYPERLIPIDEMIA: ICD-10-CM

## 2022-08-08 DIAGNOSIS — J45.40 MODERATE PERSISTENT ASTHMA, UNSPECIFIED WHETHER COMPLICATED: ICD-10-CM

## 2022-08-08 DIAGNOSIS — I48.0 PAROXYSMAL ATRIAL FIBRILLATION (HCC): ICD-10-CM

## 2022-08-08 DIAGNOSIS — I10 ESSENTIAL HYPERTENSION: ICD-10-CM

## 2022-08-08 DIAGNOSIS — G47.33 OSA (OBSTRUCTIVE SLEEP APNEA): ICD-10-CM

## 2022-08-08 DIAGNOSIS — N18.32 TYPE 2 DIABETES MELLITUS WITH STAGE 3B CHRONIC KIDNEY DISEASE, WITHOUT LONG-TERM CURRENT USE OF INSULIN (HCC): ICD-10-CM

## 2022-08-08 PROBLEM — J06.9 URI (UPPER RESPIRATORY INFECTION): Status: RESOLVED | Noted: 2022-07-30 | Resolved: 2022-08-08

## 2022-08-08 PROCEDURE — 99496 TRANSJ CARE MGMT HIGH F2F 7D: CPT | Performed by: FAMILY MEDICINE

## 2022-08-08 PROCEDURE — 1111F DSCHRG MED/CURRENT MED MERGE: CPT | Performed by: FAMILY MEDICINE

## 2022-08-08 RX ORDER — BENZONATATE 100 MG/1
100 CAPSULE ORAL 3 TIMES DAILY PRN
Qty: 30 CAPSULE | Refills: 0 | Status: SHIPPED | OUTPATIENT
Start: 2022-08-08

## 2022-08-08 NOTE — ASSESSMENT & PLAN NOTE
Patient is asymptomatic  Continue beta-blocker, anticoagulation with Eliquis 5 mg twice daily  Follow up with cardiology Dr Gill

## 2022-08-08 NOTE — ASSESSMENT & PLAN NOTE
Wt Readings from Last 3 Encounters:   08/08/22 (!) 142 kg (312 lb)   08/03/22 (!) 140 kg (307 lb 12 2 oz)   07/28/22 (!) 143 kg (314 lb 12 8 oz)     Patient reports improvement in symptoms after starting on Bumex 3 mg twice daily  C/o mild shortness of breath  BL leg edema improved  Weight has been stable at home since hospital discharge  Recommended to follow a low-sodium diet, check daily weights        Follow up with cardiology as scheduled on August 19, 2022

## 2022-08-08 NOTE — ASSESSMENT & PLAN NOTE
Lab Results   Component Value Date    HGBA1C 5 7 06/16/2022     Blood sugar at home ranges 124-130's  Patient denies hypoglycemic episodes  Decreased Januvia 100 mg to 1/2 tab  Daily due to CKD  Monitor blood sugar at home, call with blood sugar log in 2 weeks  Schedule eye exam with ophthalmology  Follow-up with podiatry for routine foot care

## 2022-08-08 NOTE — PROGRESS NOTES
Chief Complaint   Patient presents with    Transition of Care Management     Discharged 08/03/22  Health Maintenance   Topic Date Due    Pneumococcal Vaccine: Pediatrics (0 to 5 Years) and At-Risk Patients (6 to 59 Years) (1 - PCV) Never done    HIV Screening  Never done    Colorectal Cancer Screening  Never done    URINE MICROALBUMIN  04/23/2020    COVID-19 Vaccine (3 - Booster for Pfizer series) 11/05/2021    Influenza Vaccine (1) 09/01/2022    Diabetic Foot Exam  10/13/2022    DM Eye Exam  11/04/2022    BMI: Followup Plan  01/11/2023    HEMOGLOBIN A1C  12/16/2022    Depression Screening  06/16/2023    Annual Physical  06/16/2023    BMI: Adult  08/08/2023    DTaP,Tdap,and Td Vaccines (2 - Td or Tdap) 06/09/2028    Hepatitis C Screening  Completed    HIB Vaccine  Aged Out    Hepatitis B Vaccine  Aged Out    IPV Vaccine  Aged Out    Hepatitis A Vaccine  Aged Out    Meningococcal ACWY Vaccine  Aged Out    HPV Vaccine  Aged Out           Assessment/Plan:     Patient presents for TCM visit  He was hospitalized at [facility] 7/29/22- 8/3/2022 for acute on chronic diastolic congestive heart failure  Acute on chronic diastolic congestive heart failure (HCC)  Wt Readings from Last 3 Encounters:   08/08/22 (!) 142 kg (312 lb)   08/03/22 (!) 140 kg (307 lb 12 2 oz)   07/28/22 (!) 143 kg (314 lb 12 8 oz)     Patient reports improvement in symptoms after starting on Bumex 3 mg twice daily  C/o mild shortness of breath  BL leg edema improved  Weight has been stable at home since hospital discharge  Recommended to follow a low-sodium diet, check daily weights  Follow up with cardiology as scheduled on August 19, 2022        Essential hypertension  BP is stable  Continue Metoprolol ER 50 mg daily, Amlodipine 5 mg daily  Paroxysmal atrial fibrillation [facility])  Patient is asymptomatic  Continue beta-blocker, anticoagulation with Eliquis 5 mg twice daily      Follow up with cardiology Dr Hunter Laser  Stage 3a chronic kidney disease [facility])  Lab Results   Component Value Date    EGFR 40 08/03/2022    EGFR 38 08/02/2022    EGFR 39 08/01/2022    CREATININE 1 84 (H) 08/03/2022    CREATININE 1 92 (H) 08/02/2022    CREATININE 1 88 (H) 08/01/2022     Recommended to avoid it NSAID's, nephrotoxins  Check BMP ( order given by hospital attending physician)  Schedule evaluation by nephrology  Type 2 diabetes mellitus, without long-term current use of insulin ([facility])    Lab Results   Component Value Date    HGBA1C 5 7 06/16/2022     Blood sugar at home ranges 124-130's  Patient denies hypoglycemic episodes  Decreased Januvia 100 mg to 1/2 tab  Daily due to CKD  Monitor blood sugar at home, call with blood sugar log in 2 weeks  Schedule eye exam with ophthalmology  Follow-up with podiatry for routine foot care  Moderate persistent asthma  Symptoms are stable  Continue Symbicort, Spiriva inhaler  Use Ventolin HFA inhaler PRN  Continue Singulair 10 mg at bedtime  Follow-up with pulmonology Dr Rosemarie Trinh  VICKY (obstructive sleep apnea)  Patient is awaiting on replacement of CPAP machine  Morbid obesity ([facility] [address] )  Encouraged to work on weight reduction  Mixed hyperlipidemia  Continue Atorvastatin 10 mg daily  Follow-up office visit as scheduled in December  Check labs prior to visit as ordered in 6/2022  Diagnoses and all orders for this visit:    Acute on chronic diastolic congestive heart failure ([address] )    Essential hypertension    Paroxysmal atrial fibrillation (HCC)    Stage 3a chronic kidney disease ([facility] [address] )  -     Ambulatory Referral to Nephrology; Future    Type 2 diabetes mellitus with stage 3b chronic kidney disease, without long-term current use of insulin (HCC)  -     sitaGLIPtin (Januvia) 100 mg tablet; Take 1/2 tab  daily    Moderate persistent asthma, unspecified whether complicated  -     benzonatate (TESSALON PERLES) 100 mg capsule;  Take 1 capsule (100 mg total) by mouth 3 (three) times a day as needed (for cough)    VICKY (obstructive sleep apnea)    Morbid obesity (HCC)    Mixed hyperlipidemia    Other orders  -     Discontinue: sitaGLIPtin (Januvia) 100 mg tablet; Take 1/2 tab  daily         Subjective:     Patient ID: Nakia Ramires is a 56 y o  male  HPI     Patient presents for TCM visit  He was hospitalized at [facility] 7/29/22- 8/3/2022 for acute on chronic diastolic congestive heart failure  Reviewed hospital records, test results, discharge medications with patient  Patient originally presented to the hospital with increasing shortness of breath, worsening bilateral leg edema, weight gain  Patient was discharged home on Bumex 3 mg twice daily  Reports improvement in symptoms  C/o mild shortness of breath  Weight has been stable since hospital discharge, 306 lb on home scale  Denies chest pain, heart palpitations  Patient has follow-up visit scheduled with cardiology on August 19, 2022  Blood work done upon discharge home on August 3, 2022 showed glucose 123, potassium 4 3, creatinine 1 84, GFR 40  HTN - blood pressure remains stable  Currently taking amlodipine 5 mg daily, metoprolol ER 50 mg daily  PAF- followed by cardiology Dr Renzo Escobar  Currently beta-blocker anticoagulation with Eliquis 5 mg twice daily  C/o easy bruising  Type 2 DM- currently taking Januvia 100 mg daily  Reports no hypoglycemic episodes  Blood sugar averaging 124 -130's  Hyperlipidemia  -on Atorvastatin 10 mg daily  VICKY - patient is awaiting on replacement on CPAP machine  Moderate persistent asthma - c/o cough  Reports no chest tightness  Using Symbicort and Spiriva as prescribed  Review of Systems   Constitutional: Negative for activity change, appetite change, chills, fatigue and fever  HENT: Negative for congestion, ear pain, nosebleeds, sore throat and trouble swallowing  Eyes: Negative      Respiratory: Positive for cough (mild cough) and shortness of breath (improved )  Negative for chest tightness and wheezing  Cardiovascular: Positive for leg swelling (improved)  Negative for chest pain and palpitations  Gastrointestinal: Negative for abdominal pain, blood in stool, constipation, diarrhea, nausea and vomiting  Genitourinary: Positive for frequency  Negative for difficulty urinating, dysuria and flank pain  Nocturia x 2   Musculoskeletal: Negative for arthralgias, back pain, gait problem and joint swelling  Skin: Negative for rash  Neurological: Positive for numbness (in feet)  Negative for dizziness, syncope and headaches  Hematological: Negative for adenopathy  Bruises/bleeds easily  Psychiatric/Behavioral: Negative for dysphoric mood and sleep disturbance  The patient is not nervous/anxious  Objective:     Physical Exam  Vitals reviewed  Constitutional:       Appearance: Normal appearance  Comments: Morbidly obese   HENT:      Head: Normocephalic and atraumatic  Eyes:      Conjunctiva/sclera: Conjunctivae normal       Pupils: Pupils are equal, round, and reactive to light  Neck:      Vascular: No carotid bruit  Cardiovascular:      Rate and Rhythm: Normal rate and regular rhythm  Heart sounds: No murmur heard  Comments: Trace pretibial edema BL  Pulmonary:      Effort: Pulmonary effort is normal       Breath sounds: Normal breath sounds  No rales  Abdominal:      General: There is no distension  Palpations: Abdomen is soft  Tenderness: There is no abdominal tenderness  Musculoskeletal:         General: No swelling, tenderness or deformity  Normal range of motion  Cervical back: Normal range of motion and neck supple  Skin:     General: Skin is warm and dry  Findings: No rash  Comments: Bruised area on arms   Neurological:      Mental Status: He is alert     Psychiatric:         Mood and Affect: Mood normal            Vitals:    08/08/22 0846 BP: 132/84   BP Location: Left arm   Patient Position: Sitting   Cuff Size: Large   Pulse: 88   Resp: 20   Temp: 98 3 °F (36 8 °C)   TempSrc: Tympanic   SpO2: 96%   Weight: (!) 142 kg (312 lb)   Height: 6' (1 829 m)       Transitional Care Management Review:  Dagmar Jeronimo is a 56 y o  male here for TCM follow up  During the TCM phone call patient stated:    TCM Call     Date and time call was made  8/3/2022  3:27 [address] [facility] reviewed  Records reviewed    Patient was hospitialized at  [facility]    Date of Admission  07/29/22    Date of discharge  08/03/22    Diagnosis  Acute on chronic diastolic congestive heart failure ([address]     Disposition  Home    Were the patients medications reviewed and updated  No    Current Symptoms  None    Shortness of breath severity  Moderate      TCM Call     Post hospital issues  None    Should patient be enrolled in anticoag monitoring? No    Scheduled for follow up? Yes    Patients specialists  Cardiologist    Cardiologist name  Twyla Bullard MD    Cardiologist contact #  [phone]    Did you obtain your prescribed medications  Yes    Do you need help managing your prescriptions or medications  No    Is transportation to your appointment needed  No    I have advised the patient to call PCP with any new or worsening symptoms  [facility] members; Spouse or Significiant other    Support System  Family;  Spouse    The type of support provided  None    Do you have social support  Yes, as much as I need    Are you recieving any outpatient services  No    Are you recieving home care services  No    Are you using any community resources  No    Current waiver services  No    Have you fallen in the last 12 months  No    Interperter language line needed  No    Counseling  Patient          Antonella Skelton MD

## 2022-08-08 NOTE — ASSESSMENT & PLAN NOTE
Lab Results   Component Value Date    EGFR 40 08/03/2022    EGFR 38 08/02/2022    EGFR 39 08/01/2022    CREATININE 1 84 (H) 08/03/2022    CREATININE 1 92 (H) 08/02/2022    CREATININE 1 88 (H) 08/01/2022     Recommended to avoid it NSAID's, nephrotoxins  Check BMP ( order given by hospital attending physician)  Schedule evaluation by nephrology

## 2022-08-12 DIAGNOSIS — E87.6 HYPOKALEMIA: ICD-10-CM

## 2022-08-12 RX ORDER — POTASSIUM CHLORIDE 20 MEQ/1
40 TABLET, EXTENDED RELEASE ORAL 2 TIMES DAILY
Qty: 120 TABLET | Refills: 5 | Status: SHIPPED | OUTPATIENT
Start: 2022-08-12

## 2022-08-15 NOTE — PROGRESS NOTES
Heart Failure Outpatient Progress Note - Manish Shafer 56 y o  male MRN: [id number]    @ Encounter: [id number]      Assessment/Plan:    Patient Active Problem List    Diagnosis Date Noted    Leg cramps 06/16/2022    Paroxysmal atrial fibrillation ([facility] [address] ) 03/01/2022    Stage 3a chronic kidney disease ([facility] ) 12/23/2021    Hypokalemia 11/14/2021    Acute on chronic diastolic congestive heart failure ([facility] ) 11/12/2021    Moderate persistent asthma 10/11/2021    Dyspnea on exertion 10/11/2021    Cardiomyopathy ([facility] ) 07/19/2021    Well adult exam 05/14/2021    Bilateral leg edema 02/19/2020    Edema of both feet 01/23/2020    VICKY (obstructive sleep apnea)     Daytime sleepiness 07/17/2019    Mixed hyperlipidemia 04/18/2019    Morbid obesity ([facility] ) 04/18/2019    Vitamin B12 deficiency 04/18/2019    Vitamin D deficiency 04/18/2019    Knee sprain, bilateral 06/14/2018    Primary osteoarthritis of both knees 06/14/2018    Irritable bowel syndrome with diarrhea 01/30/2018    Essential hypertension 01/30/2018    Type 2 diabetes mellitus, without long-term current use of insulin ([facility] ) 01/30/2018       Chronic HFpEF   Volume :Bumex 3 mg BID  HTN: Amlodipine 5 mg daily  Compensated on exam today but with c/o some wheezing SOB this week  Also episode of chest pain at rest, lasting two hours yesterday  Cardiomems readings from this week at goal  Suspect symptoms to be more respiratory in nature but will continue to monitor   Check nuclear stress test now that volume status has improved    Weight 314 on discharge, 312 lbs today,                     Lab Results   Component Value Date     NTBNP 619 (H) 11/09/2021      RHC 3/9/22:    RA 4    RV 35/4    PA 35/12/21    PCWP 10    PA sat 64%    Travis CO 5 56 L/min    Travis CI 2 2L/min/m2    PVR 1 97 SAGASTUME       TTE 11/12/21: LVEF 55%, LVIDd 6 cm,  grade I DD, RV normal, basal inferior and inferolateral walls hypokinetic, trace TR  TTE 7/20201: LVEF 50%, LVIDd 6 1 cm,  no RWMA, akinesis of basal inferior and basal mid inferolateral walls, trace MR, trace TR, IVC mildly dilated, RV normal  TTE 3/2020: LVEF 40-45%, LVIDd 6 12 cm,  RV normal, no MR/TR, no effusions,      Atrial fibrillation    [id number] = 3 (HF, HTN, DM)      Diagnosed 02/2022     Anticoagulation on Eliquis      Rate control: metoprolol as above  Rhythm control: No      HTN  Well controlled  Type II DM  A1C 5 7 %  HLD  FLP 10/1/21: , TG 90, HDL 66, LDL 73  On atorvastatin 10 mg daily  Chronic respiratory insufficiency  -PFTs 7/19/21:   Interpretation:  Severe obstructive airflow defect on spirometry  Significant improvement in airflow or forced vital capacity in response to the administration of bronchodilator per ATS standards  Air trapping as indicated by the lung volumes  Mild decrease in diffusion capacity  Flow-volume loop consistent with obstruction     CKD  Baseline creat around 1 6  1 8 on most recent labs  Moderate persistent asthma  Significant expiratory wheezes on exam today with need for inhaler q 4 hours,  In possible mild exacerbation   Followed by pulm  Morbid obesity  VICKY  CPAP on recall  Working on getting new equipment  Tobacco abuse  Occasional cigar  Quit 1 year ago  HPI: 56year old with PMH as above who presents for hospital follow up  Recenlty admitted with acute decompensated HF with unclear trigger  In the past was felt to be triggered by asthma exacerbations  Has complaints of some wheezing and some increased SOB today  Also had episode of chest pain yesterday while resting in a chair  Lasted two hours with no accompanying symptoms  Radiated to his back, between shoulder blades  Does not get this symptom with exertion  Denies orthopnea or PND  Still not using CPAP as still on recall  Weight is stable    Past Medical History:   Diagnosis Date    Acute gastritis without hemorrhage     last assessed 3/24/17    Asthma     Diabetes mellitus ([facility] )     Gastric bypass status for obesity     Hyperlipidemia     Hypertension     Impaired fasting glucose     last assessed 5/10/17    Obesity     Viral gastroenteritis     last assessed 11/4/16       12 point ROS negative other than that stated in HPI    Allergies   Allergen Reactions    Azithromycin Other (See Comments)     Shaky, uneasy feeling     Bactrim [Sulfamethoxazole-Trimethoprim] Other (See Comments)     shakiness           Current Outpatient Medications:     Accu-Chek FastClix Lancets MISC, Test blood sugar twice daily, Disp: 200 each, Rfl: 3    albuterol (PROVENTIL HFA,VENTOLIN HFA) 90 mcg/act inhaler, INHALE 2 PUFFS EVERY 4 (FOUR) HOURS AS NEEDED FOR WHEEZING OR SHORTNESS OF BREATH, Disp: 18 g, Rfl: 3    amLODIPine (NORVASC) 5 mg tablet, Take 1 tablet (5 mg total) by mouth daily, Disp: 30 tablet, Rfl: 0    apixaban (Eliquis) 5 mg, Take 1 tablet (5 mg total) by mouth 2 (two) times a day, Disp: 60 tablet, Rfl: 5    atorvastatin (LIPITOR) 10 mg tablet, Take 1 tablet (10 mg total) by mouth in the morning , Disp: 30 tablet, Rfl: 5    benzonatate (TESSALON PERLES) 100 mg capsule, Take 1 capsule (100 mg total) by mouth 3 (three) times a day as needed (for cough), Disp: 30 capsule, Rfl: 0    Blood Glucose Monitoring Suppl (Accu-Chek Guide) w/Device KIT, Use 2 (two) times a day Test blood sugar twice daily, Disp: 1 kit, Rfl: 0    bumetanide (BUMEX) 1 mg tablet, Take 3 tablets (3 mg total) by mouth 2 (two) times a day, Disp: 180 tablet, Rfl: 0    levalbuterol (Xopenex) 1 25 mg/3 mL nebulizer solution, Take 3 mL (1 25 mg total) by nebulization 3 (three) times a day (Patient taking differently: Take 1 25 mg by nebulization as needed), Disp: 270 mL, Rfl: 3    metoprolol succinate (TOPROL-XL) 50 mg 24 hr tablet, Take 1 tablet (50 mg total) by mouth daily, Disp: 90 tablet, Rfl: 1    montelukast (SINGULAIR) 10 mg tablet, Take 1 tablet (10 mg total) by mouth daily at bedtime, Disp: 90 tablet, Rfl: 1    potassium chloride (K-DUR,KLOR-CON) 20 mEq tablet, Take 2 tablets (40 mEq total) by mouth 2 (two) times a day, Disp: 120 tablet, Rfl: 5    sitaGLIPtin (Januvia) 100 mg tablet, Take 1/2 tab  daily, Disp: 30 tablet, Rfl: 5    Symbicort 160-4 5 MCG/ACT inhaler, 2 PUFFS BY MOUTH TWICE DAILY , RINSE MOUTH THEN SPIT AFTER USE, Disp: 30 6 g, Rfl: 2    tiotropium (Spiriva Respimat) 1 25 MCG/ACT AERS inhaler, Inhale 2 puffs daily, Disp: 4 g, Rfl: 3    Social History     Socioeconomic History    Marital status: /Civil Union     Spouse name: Not on file    Number of children: Not on file    Years of education: Not on file    Highest education level: Not on file   Occupational History    Not on file   Tobacco Use    Smoking status: Former Smoker     Packs/day: 0 01     Years: 11 00     Pack years: 0 11     Types: Pipe, Cigars     Start date:      Quit date:      Years since quittin 6    Smokeless tobacco: Never Used    Tobacco comment: cigar once a day   Vaping Use    Vaping Use: Never used   Substance and Sexual Activity    Alcohol use: Not Currently     Alcohol/week: 2 0 standard drinks     Types: 2 Shots of liquor per week     Comment: social    Drug use: No    Sexual activity: Yes     Partners: Female     Birth control/protection: None   Other Topics Concern    Not on file   Social History Narrative    Not on file     Social Determinants of Health     Financial Resource Strain: Not on file   Food Insecurity: No Food Insecurity    Worried About Running Out of Food in the Last Year: Never true    Aria of Food in the Last Year: Never true   Transportation Needs: No Transportation Needs    Lack of Transportation (Medical): No    Lack of Transportation (Non-Medical):  No   Physical Activity: Not on file   Stress: Not on file   Social Connections: Not on file   Intimate Partner Violence: Not on file   Housing Stability: Unknown    Unable to Pay for Housing in the Last Year: No    Number of Places Lived in the Last Year: Not on file    Unstable Housing in the Last Year: No       Family History   Problem Relation Age of Onset    Stroke Mother     Hypertension Father     Cancer Father     COPD Father        Physical Exam:  /60 (BP Location: Right arm, Patient Position: Sitting, Cuff Size: Standard)   Pulse 85   Ht 6' (1 829 m)   Wt (!) 142 kg (312 lb 6 4 oz)   SpO2 95%   BMI 42 37 kg/m²     Wt Readings from Last 3 Encounters:   08/08/22 (!) 142 kg (312 lb)   08/03/22 (!) 140 kg (307 lb 12 2 oz)   07/28/22 (!) 143 kg (314 lb 12 8 oz)         GEN:  Captain appears well, alert and oriented x 3, pleasant and cooperative   HEENT: pupils equal, round, and reactive to light; extraocular muscles intact  NECK: supple, no carotid bruits   HEART: regular rhythm, normal S1 and S2, no murmurs, clicks, gallops or rubs, JVP is down   LUNGS: clear to auscultation bilaterally; no wheezes, rales, or rhonchi   ABDOMEN: normal bowel sounds, soft, no tenderness, no distention  EXTREMITIES: peripheral pulses normal; no clubbing, cyanosis, or edema  NEURO: no focal findings   SKIN: normal without suspicious lesions on exposed skin    Labs & Results:    Chemistry        Component Value Date/Time    K 4 3 08/03/2022 0442     08/03/2022 0442    CO2 29 08/03/2022 0442    BUN 41 (H) 08/03/2022 0442    CREATININE 1 84 (H) 08/03/2022 0442        Component Value Date/Time    CALCIUM 8 8 08/03/2022 0442    ALKPHOS 91 07/29/2022 1322    AST 11 07/29/2022 1322    ALT 13 07/29/2022 1322        Lab Results   Component Value Date    WBC 8 98 08/02/2022    HGB 11 8 (L) 08/02/2022    HCT 37 7 08/02/2022    MCV 95 08/02/2022     08/02/2022     Lab Results   Component Value Date    NTBNP 903 (H) 07/29/2022      Lab Results   Component Value Date    LDLCALC 73 10/01/2021     Lab Results   Component Value Date    [id number] 1 100 07/19/2021       EKG personally reviewed by RODRIGUE Wilkinson     No results found for this visit on 08/17/22  Counseling / Coordination of Care  Total floor / unit time spent today 40 minutes  Greater than 50% of total time was spent with the patient and / or family counseling and / or coordination of care  A description of the counseling / coordination of care: 20  Thank you for the opportunity to participate in the care of this patient      Flip Plummer

## 2022-08-17 ENCOUNTER — OFFICE VISIT (OUTPATIENT)
Dept: CARDIOLOGY CLINIC | Facility: CLINIC | Age: 57
End: 2022-08-17
Payer: COMMERCIAL

## 2022-08-17 VITALS
DIASTOLIC BLOOD PRESSURE: 60 MMHG | SYSTOLIC BLOOD PRESSURE: 102 MMHG | HEIGHT: 72 IN | WEIGHT: 312.4 LBS | HEART RATE: 85 BPM | OXYGEN SATURATION: 95 % | BODY MASS INDEX: 42.31 KG/M2

## 2022-08-17 DIAGNOSIS — I50.30 DIASTOLIC CONGESTIVE HEART FAILURE, UNSPECIFIED HF CHRONICITY (HCC): Primary | ICD-10-CM

## 2022-08-17 PROCEDURE — 99215 OFFICE O/P EST HI 40 MIN: CPT | Performed by: NURSE PRACTITIONER

## 2022-08-17 NOTE — PATIENT INSTRUCTIONS
Weight yourself daily  If you gain 3 lbs in one day or 5 lbs in one week, please call the office at [phone] and ask for a nurse or the heart failure nurse  Keep your sodium intake to <2 grams, (2000 mg) per day, and fluids <2 Liters (2000 ml) per day   This is around 6-7, 8 oz glasses of fluid per day    Schedule stress test

## 2022-08-18 NOTE — PROGRESS NOTES
[facility]  Progress Note - Andrea Jackson 1965, 56 y o  male MRN: [id number]  Unit/Bed#: [facility] 510-01 Encounter: [id number]  Primary Care Provider: Emma Sims MD   Date and time admitted to hospital: 7/29/2022 12:35 PM    * Acute on chronic diastolic congestive heart failure [facility])  Assessment & Plan  Wt Readings from Last 3 Encounters:   07/30/22 (!) 141 kg (310 lb 10 1 oz)   07/28/22 (!) 143 kg (314 lb 12 8 oz)   06/24/22 (!) 142 kg (312 lb 1 6 oz)       · Patient with increased shortness of breath for a week and half  · Positive lower extremity edema  · Patient seen by cardiology specialist in the ED started on Bumex 3 mg IV b i d    · Strict /I/os/ Daily weights   · Fluid restriction 1 8 liter  · Suspect multifactorial dt pt hx Asthma  · Continue bb per HF but started on norvasc no ace dt renal function       Moderate persistent asthma  Assessment & Plan  · Patient with history of moderate persistent asthma  · No wheezing on exam has patient will also with lower extremity edema weight gain  · Do not feel this is an asthma exacerbation by represents an exacerbation of congestive heart failure    URI (upper respiratory infection)  Assessment & Plan  Some mild nasal congestion - possibly contributing to productive cough   · Will order Claritin for allergies   · Question environmental allergies vs viral   · Order tessalon pearls for cough TID      Stage 3a chronic kidney disease [facility])  Assessment & Plan  Lab Results   Component Value Date    EGFR 44 07/30/2022    EGFR 49 07/29/2022    EGFR 42 07/27/2022    CREATININE 1 70 (H) 07/30/2022    CREATININE 1 56 (H) 07/29/2022    CREATININE 1 75 (H) 07/27/2022   · B/L 1 6 - 1 70   · strict I/so daily weights  · Avoid nephrotoxins   · Avoid hypotension   · Continue iv bumex and trend renal function   ·     Morbid obesity ([address] )  Assessment & Plan  · Morbid obesity noted  · Dietary maneuvers discussed    Type 2 diabetes mellitus with hyperglycemia [facility])  Assessment & Plan  Lab Results   Component Value Date    HGBA1C 5 7 2022       Recent Labs     22  2048 22  0613 22  1106 22  1624   POCGLU 134 139 150* 145*       Blood Sugar Average: Last 72 hrs:  · (P) 142 continue outpatient newly  · Fingerstick sliding scale coverage    Essential hypertension  Assessment & Plan  · Continue Toprol XL 50 mg daily        VTE Pharmacologic Prophylaxis:   High Risk (Score >/= 5) - Pharmacological DVT Prophylaxis Ordered: apixaban (Eliquis)  Sequential Compression Devices Ordered  Patient Centered Rounds: I performed bedside rounds with nursing staff today  Discussions with Specialists or Other Care Team Provider: nursing     Education and Discussions with Family / Patient: Patient declined call to   Time Spent for Care: 45 minutes  More than 50% of total time spent on counseling and coordination of care as described above  Current Length of Stay: 1 day(s)  Current Patient Status: Inpatient   Certification Statement: The patient will continue to require additional inpatient hospital stay due to ongoing need for iv diuresis   Discharge Plan: Anticipate discharge in 48-72 hrs to home  Code Status: Level 1 - Full Code    Subjective:   Pt is  On room air does appear to have some nasal congestion and productive cough  Pt does report feeling short of breath with ambulation no chest pain does have occ palpitations     Objective:     Vitals:   Temp (24hrs), Av 1 °F (36 7 °C), Min:97 9 °F (36 6 °C), Max:98 4 °F (36 9 °C)    Temp:  [97 9 °F (36 6 °C)-98 4 °F (36 9 °C)] 98 2 °F (36 8 °C)  HR:  [67-86] 67  Resp:  [17-22] 17  BP: (129-157)/(79-89) 142/89  SpO2:  [93 %-98 %] 95 %  Body mass index is 40 98 kg/m²  Input and Output Summary (last 24 hours):      Intake/Output Summary (Last 24 hours) at 2022 1716  Last data filed at 2022 1336  Gross per 24 hour   Intake 180 ml   Output 1800 ml Net -1620 ml       Physical Exam:   Physical Exam  Constitutional:       General: He is not in acute distress  Appearance: He is obese  He is not ill-appearing, toxic-appearing or diaphoretic  HENT:      Head: Normocephalic and atraumatic  Nose: No congestion or rhinorrhea  Mouth/Throat:      Pharynx: Oropharynx is clear  Eyes:      Conjunctiva/sclera: Conjunctivae normal    Cardiovascular:      Rate and Rhythm: Normal rate  Heart sounds: No murmur heard  No friction rub  No gallop  Pulmonary:      Effort: No respiratory distress  Breath sounds: No stridor  Rales present  No wheezing or rhonchi  Comments: Diminished slight crackles bl   Chest:      Chest wall: No tenderness  Abdominal:      General: There is no distension  Palpations: There is no mass  Tenderness: There is no abdominal tenderness  There is no guarding or rebound  Hernia: No hernia is present  Musculoskeletal:         General: Swelling (bl lower extremeties ) present  No tenderness, deformity or signs of injury  Right lower leg: No edema  Left lower leg: No edema  Skin:     Coloration: Skin is not jaundiced or pale  Findings: No bruising, erythema, lesion or rash  Neurological:      Mental Status: He is oriented to person, place, and time     Psychiatric:         Behavior: Behavior normal           Additional Data:     Labs:  Results from last 7 days   Lab Units 07/30/22  0535 07/29/22  1322   WBC Thousand/uL 8 16 9 60   HEMOGLOBIN g/dL 11 7* 12 4   HEMATOCRIT % 37 2 38 2   PLATELETS Thousands/uL 259 324   NEUTROS PCT %  --  61   LYMPHS PCT %  --  17   MONOS PCT %  --  9   EOS PCT %  --  11*     Results from last 7 days   Lab Units 07/30/22  0535 07/29/22  1322   SODIUM mmol/L 137 136   POTASSIUM mmol/L 4 1 3 9   CHLORIDE mmol/L 103 101   CO2 mmol/L 32 28   BUN mg/dL 30* 26*   CREATININE mg/dL 1 70* 1 56*   ANION GAP mmol/L 2* 7   CALCIUM mg/dL 8 6 8 9   ALBUMIN g/dL  -- 3 2*   TOTAL BILIRUBIN mg/dL  --  0 42   ALK PHOS U/L  --  91   ALT U/L  --  13   AST U/L  --  11   GLUCOSE RANDOM mg/dL 129 139         Results from last 7 days   Lab Units 07/30/22  1624 07/30/22  1106 07/30/22  0613 07/29/22  2048   POC GLUCOSE mg/dl 145* 150* 139 134               Lines/Drains:  Invasive Devices  Report    Peripheral Intravenous Line  Duration           Peripheral IV 07/29/22 Left;Proximal;Ventral (anterior) Forearm 1 day                  Telemetry:  Telemetry Orders (From admission, onward)             48 Hour Telemetry Monitoring  Continuous x 48 hours        References:    Telemetry Guidelines   Question:  Reason for 48 Hour Telemetry  Answer:  Acute Decompensated CHF (continuous diuretic infusion or total diuretic dose > 200 mg daily, associated electrolyte derangement, ionotropic drip, history of ventricular arrhythmia, or new EF <35%)                 Telemetry Reviewed: Normal Sinus Rhythm  Indication for Continued Telemetry Use: Arrthymias requiring medical therapy             Imaging: Reviewed radiology reports from this admission including: chest xray    Recent Cultures (last 7 days):         Last 24 Hours Medication List:   Current Facility-Administered Medications   Medication Dose Route Frequency Provider Last Rate    acetaminophen  650 mg Oral Q6H PRN Fran Chacon MD      albuterol  2 puff Inhalation Q4H PRN Fran Chacon MD      aluminum-magnesium hydroxide-simethicone  30 mL Oral Q6H PRN Fran Chacon MD      amLODIPine  5 mg Oral Daily Yara Carlson DO      apixaban  5 mg Oral BID Fran Chacon MD      atorvastatin  10 mg Oral Daily Fran Chacon MD      benzonatate  100 mg Oral TID RODRIGUE Awan      budesonide-formoterol  2 puff Inhalation BID Fran Chacon MD      bumetanide  3 mg Intravenous BID (diuretic) Fran Chacon MD      insulin lispro  1-5 Units Subcutaneous TID AC Fran Chacon MD      insulin lispro  1-5 Units Subcutaneous HS Kwame Saleh MD      loratadine  10 mg Oral Daily RODRIGUE Richmond      metoprolol succinate  50 mg Oral Daily Kwame Saleh MD      montelukast  10 mg Oral HS Kwame Saleh MD      ondansetron  4 mg Intravenous Q6H PRN Kwame Saleh MD      polyethylene glycol  17 g Oral Daily PRN Kwame Saleh MD      potassium chloride  40 mEq Oral BID Kwame Saleh MD      sitaGLIPtin  100 mg Oral Daily Kwame Saleh MD      umeclidinium bromide  1 puff Inhalation Daily Kwame Saleh MD          Today, Patient Was Seen By: RODRIGUE Richmond    **Please Note: This note may have been constructed using a voice recognition system  ** Opt out

## 2022-08-24 ENCOUNTER — TELEPHONE (OUTPATIENT)
Dept: NEPHROLOGY | Facility: CLINIC | Age: 57
End: 2022-08-24

## 2022-09-01 ENCOUNTER — TELEPHONE (OUTPATIENT)
Dept: CARDIOLOGY CLINIC | Facility: CLINIC | Age: 57
End: 2022-09-01

## 2022-09-01 ENCOUNTER — APPOINTMENT (OUTPATIENT)
Dept: RADIOLOGY | Facility: HOSPITAL | Age: 57
DRG: 202 | End: 2022-09-01
Payer: COMMERCIAL

## 2022-09-01 ENCOUNTER — HOSPITAL ENCOUNTER (INPATIENT)
Facility: HOSPITAL | Age: 57
LOS: 6 days | Discharge: HOME | DRG: 202 | End: 2022-09-07
Attending: EMERGENCY MEDICINE | Admitting: INTERNAL MEDICINE
Payer: COMMERCIAL

## 2022-09-01 DIAGNOSIS — R42 LIGHTHEADEDNESS: ICD-10-CM

## 2022-09-01 DIAGNOSIS — R07.9 CHEST PAIN: ICD-10-CM

## 2022-09-01 DIAGNOSIS — N18.31 STAGE 3A CHRONIC KIDNEY DISEASE (HCC): ICD-10-CM

## 2022-09-01 DIAGNOSIS — I10 ESSENTIAL HYPERTENSION: ICD-10-CM

## 2022-09-01 DIAGNOSIS — R94.39 ABNORMAL NUCLEAR STRESS TEST: ICD-10-CM

## 2022-09-01 DIAGNOSIS — R06.02 SHORTNESS OF BREATH: ICD-10-CM

## 2022-09-01 DIAGNOSIS — E87.1 HYPONATREMIA: Primary | ICD-10-CM

## 2022-09-01 DIAGNOSIS — I50.32 CHRONIC DIASTOLIC HEART FAILURE (HCC): ICD-10-CM

## 2022-09-01 DIAGNOSIS — R07.9 CHEST PAIN, UNSPECIFIED TYPE: ICD-10-CM

## 2022-09-01 DIAGNOSIS — I48.0 PAROXYSMAL ATRIAL FIBRILLATION (HCC): ICD-10-CM

## 2022-09-01 DIAGNOSIS — J45.51 SEVERE PERSISTENT ASTHMA WITH ACUTE EXACERBATION: ICD-10-CM

## 2022-09-01 PROBLEM — R68.83 CHILLS: Status: ACTIVE | Noted: 2022-09-01

## 2022-09-01 LAB
2HR DELTA HS TROPONIN: -2 NG/L
4HR DELTA HS TROPONIN: -1 NG/L
ALBUMIN SERPL BCP-MCNC: 3 G/DL (ref 3.5–5)
ALP SERPL-CCNC: 147 U/L (ref 46–116)
ALT SERPL W P-5'-P-CCNC: 33 U/L (ref 12–78)
ANION GAP SERPL CALCULATED.3IONS-SCNC: 14 MMOL/L (ref 4–13)
AST SERPL W P-5'-P-CCNC: 44 U/L (ref 5–45)
BASOPHILS # BLD AUTO: 0.04 THOUSANDS/ΜL (ref 0–0.1)
BASOPHILS NFR BLD AUTO: 0 % (ref 0–1)
BILIRUB SERPL-MCNC: 1.41 MG/DL (ref 0.2–1)
BUN SERPL-MCNC: 20 MG/DL (ref 5–25)
CALCIUM ALBUM COR SERPL-MCNC: 9 MG/DL (ref 8.3–10.1)
CALCIUM SERPL-MCNC: 8.2 MG/DL (ref 8.3–10.1)
CARDIAC TROPONIN I PNL SERPL HS: 20 NG/L
CARDIAC TROPONIN I PNL SERPL HS: 21 NG/L
CARDIAC TROPONIN I PNL SERPL HS: 22 NG/L
CHLORIDE SERPL-SCNC: 93 MMOL/L (ref 96–108)
CO2 SERPL-SCNC: 21 MMOL/L (ref 21–32)
CREAT SERPL-MCNC: 1.91 MG/DL (ref 0.6–1.3)
EOSINOPHIL # BLD AUTO: 0.02 THOUSAND/ΜL (ref 0–0.61)
EOSINOPHIL NFR BLD AUTO: 0 % (ref 0–6)
ERYTHROCYTE [DISTWIDTH] IN BLOOD BY AUTOMATED COUNT: 12.6 % (ref 11.6–15.1)
FLUAV RNA RESP QL NAA+PROBE: NEGATIVE
FLUBV RNA RESP QL NAA+PROBE: NEGATIVE
GFR SERPL CREATININE-BSD FRML MDRD: 38 ML/MIN/1.73SQ M
GLUCOSE SERPL-MCNC: 149 MG/DL (ref 65–140)
HCT VFR BLD AUTO: 37.3 % (ref 36.5–49.3)
HGB BLD-MCNC: 12.3 G/DL (ref 12–17)
IMM GRANULOCYTES # BLD AUTO: 0.11 THOUSAND/UL (ref 0–0.2)
IMM GRANULOCYTES NFR BLD AUTO: 1 % (ref 0–2)
LYMPHOCYTES # BLD AUTO: 0.49 THOUSANDS/ΜL (ref 0.6–4.47)
LYMPHOCYTES NFR BLD AUTO: 4 % (ref 14–44)
MCH RBC QN AUTO: 29.7 PG (ref 26.8–34.3)
MCHC RBC AUTO-ENTMCNC: 33 G/DL (ref 31.4–37.4)
MCV RBC AUTO: 90 FL (ref 82–98)
MONOCYTES # BLD AUTO: 0.88 THOUSAND/ΜL (ref 0.17–1.22)
MONOCYTES NFR BLD AUTO: 7 % (ref 4–12)
NEUTROPHILS # BLD AUTO: 10.85 THOUSANDS/ΜL (ref 1.85–7.62)
NEUTS SEG NFR BLD AUTO: 88 % (ref 43–75)
NRBC BLD AUTO-RTO: 0 /100 WBCS
NT-PROBNP SERPL-MCNC: 2545 PG/ML
OSMOLALITY UR/SERPL-RTO: 296 MMOL/KG (ref 282–298)
PLATELET # BLD AUTO: 239 THOUSANDS/UL (ref 149–390)
PMV BLD AUTO: 10.5 FL (ref 8.9–12.7)
POTASSIUM SERPL-SCNC: 3.5 MMOL/L (ref 3.5–5.3)
PROCALCITONIN SERPL-MCNC: 1.74 NG/ML
PROT SERPL-MCNC: 7.1 G/DL (ref 6.4–8.4)
RBC # BLD AUTO: 4.14 MILLION/UL (ref 3.88–5.62)
RSV RNA RESP QL NAA+PROBE: NEGATIVE
SARS-COV-2 RNA RESP QL NAA+PROBE: NEGATIVE
SODIUM SERPL-SCNC: 128 MMOL/L (ref 135–147)
TSH SERPL DL<=0.05 MIU/L-ACNC: 1.27 UIU/ML (ref 0.45–4.5)
URATE SERPL-MCNC: 12 MG/DL (ref 3.5–8.5)
WBC # BLD AUTO: 12.39 THOUSAND/UL (ref 4.31–10.16)

## 2022-09-01 PROCEDURE — 99285 EMERGENCY DEPT VISIT HI MDM: CPT

## 2022-09-01 PROCEDURE — 36415 COLL VENOUS BLD VENIPUNCTURE: CPT | Performed by: EMERGENCY MEDICINE

## 2022-09-01 PROCEDURE — 99285 EMERGENCY DEPT VISIT HI MDM: CPT | Performed by: EMERGENCY MEDICINE

## 2022-09-01 PROCEDURE — 0241U HB NFCT DS VIR RESP RNA 4 TRGT: CPT | Performed by: EMERGENCY MEDICINE

## 2022-09-01 PROCEDURE — 85025 COMPLETE CBC W/AUTO DIFF WBC: CPT | Performed by: EMERGENCY MEDICINE

## 2022-09-01 PROCEDURE — 87040 BLOOD CULTURE FOR BACTERIA: CPT | Performed by: EMERGENCY MEDICINE

## 2022-09-01 PROCEDURE — 84550 ASSAY OF BLOOD/URIC ACID: CPT | Performed by: INTERNAL MEDICINE

## 2022-09-01 PROCEDURE — 84484 ASSAY OF TROPONIN QUANT: CPT | Performed by: EMERGENCY MEDICINE

## 2022-09-01 PROCEDURE — 87081 CULTURE SCREEN ONLY: CPT | Performed by: INTERNAL MEDICINE

## 2022-09-01 PROCEDURE — 84443 ASSAY THYROID STIM HORMONE: CPT | Performed by: INTERNAL MEDICINE

## 2022-09-01 PROCEDURE — 83930 ASSAY OF BLOOD OSMOLALITY: CPT | Performed by: INTERNAL MEDICINE

## 2022-09-01 PROCEDURE — 80053 COMPREHEN METABOLIC PANEL: CPT | Performed by: EMERGENCY MEDICINE

## 2022-09-01 PROCEDURE — 94640 AIRWAY INHALATION TREATMENT: CPT

## 2022-09-01 PROCEDURE — 71046 X-RAY EXAM CHEST 2 VIEWS: CPT

## 2022-09-01 PROCEDURE — 99223 1ST HOSP IP/OBS HIGH 75: CPT | Performed by: INTERNAL MEDICINE

## 2022-09-01 PROCEDURE — 93005 ELECTROCARDIOGRAM TRACING: CPT

## 2022-09-01 PROCEDURE — 84145 PROCALCITONIN (PCT): CPT | Performed by: EMERGENCY MEDICINE

## 2022-09-01 PROCEDURE — 83880 ASSAY OF NATRIURETIC PEPTIDE: CPT | Performed by: EMERGENCY MEDICINE

## 2022-09-01 RX ORDER — METOPROLOL SUCCINATE 50 MG/1
50 TABLET, EXTENDED RELEASE ORAL DAILY
Status: DISCONTINUED | OUTPATIENT
Start: 2022-09-02 | End: 2022-09-07 | Stop reason: HOSPADM

## 2022-09-01 RX ORDER — INSULIN LISPRO 100 [IU]/ML
1-5 INJECTION, SOLUTION INTRAVENOUS; SUBCUTANEOUS
Status: DISCONTINUED | OUTPATIENT
Start: 2022-09-01 | End: 2022-09-07 | Stop reason: HOSPADM

## 2022-09-01 RX ORDER — BENZONATATE 100 MG/1
100 CAPSULE ORAL 3 TIMES DAILY PRN
Status: DISCONTINUED | OUTPATIENT
Start: 2022-09-01 | End: 2022-09-07 | Stop reason: HOSPADM

## 2022-09-01 RX ORDER — MONTELUKAST SODIUM 10 MG/1
10 TABLET ORAL
Status: DISCONTINUED | OUTPATIENT
Start: 2022-09-01 | End: 2022-09-07 | Stop reason: HOSPADM

## 2022-09-01 RX ORDER — AMLODIPINE BESYLATE 5 MG/1
5 TABLET ORAL DAILY
Status: DISCONTINUED | OUTPATIENT
Start: 2022-09-02 | End: 2022-09-06

## 2022-09-01 RX ORDER — ATORVASTATIN CALCIUM 10 MG/1
10 TABLET, FILM COATED ORAL DAILY
Status: DISCONTINUED | OUTPATIENT
Start: 2022-09-02 | End: 2022-09-07 | Stop reason: HOSPADM

## 2022-09-01 RX ORDER — INSULIN LISPRO 100 [IU]/ML
1-6 INJECTION, SOLUTION INTRAVENOUS; SUBCUTANEOUS
Status: DISCONTINUED | OUTPATIENT
Start: 2022-09-02 | End: 2022-09-07 | Stop reason: HOSPADM

## 2022-09-01 RX ORDER — PREDNISONE 20 MG/1
40 TABLET ORAL DAILY
Status: DISCONTINUED | OUTPATIENT
Start: 2022-09-01 | End: 2022-09-03

## 2022-09-01 RX ORDER — ALBUTEROL SULFATE 90 UG/1
2 AEROSOL, METERED RESPIRATORY (INHALATION) EVERY 4 HOURS PRN
Status: DISCONTINUED | OUTPATIENT
Start: 2022-09-01 | End: 2022-09-03

## 2022-09-01 RX ORDER — BUDESONIDE AND FORMOTEROL FUMARATE DIHYDRATE 160; 4.5 UG/1; UG/1
2 AEROSOL RESPIRATORY (INHALATION) 2 TIMES DAILY
Status: DISCONTINUED | OUTPATIENT
Start: 2022-09-01 | End: 2022-09-07 | Stop reason: HOSPADM

## 2022-09-01 RX ORDER — INSULIN GLARGINE 100 [IU]/ML
7 INJECTION, SOLUTION SUBCUTANEOUS
Status: DISCONTINUED | OUTPATIENT
Start: 2022-09-01 | End: 2022-09-03

## 2022-09-01 RX ORDER — LEVALBUTEROL 1.25 MG/.5ML
1.25 SOLUTION, CONCENTRATE RESPIRATORY (INHALATION) 3 TIMES DAILY
Status: DISCONTINUED | OUTPATIENT
Start: 2022-09-01 | End: 2022-09-03

## 2022-09-01 RX ORDER — POTASSIUM CHLORIDE 20 MEQ/1
40 TABLET, EXTENDED RELEASE ORAL 2 TIMES DAILY
Status: DISCONTINUED | OUTPATIENT
Start: 2022-09-02 | End: 2022-09-07 | Stop reason: HOSPADM

## 2022-09-01 RX ADMIN — CEFTRIAXONE SODIUM 1000 MG: 10 INJECTION, POWDER, FOR SOLUTION INTRAVENOUS at 21:23

## 2022-09-01 RX ADMIN — LEVALBUTEROL HYDROCHLORIDE 1.25 MG: 1.25 SOLUTION, CONCENTRATE RESPIRATORY (INHALATION) at 21:24

## 2022-09-01 RX ADMIN — APIXABAN 5 MG: 5 TABLET, FILM COATED ORAL at 21:23

## 2022-09-01 RX ADMIN — BUMETANIDE 3 MG: 2 TABLET ORAL at 21:23

## 2022-09-01 RX ADMIN — PREDNISONE 40 MG: 20 TABLET ORAL at 21:22

## 2022-09-01 RX ADMIN — INSULIN GLARGINE 7 UNITS: 100 INJECTION, SOLUTION SUBCUTANEOUS at 21:22

## 2022-09-01 NOTE — ED PROVIDER NOTES
History  Chief Complaint   Patient presents with    Chest Pain     Pt reports CP and SOB since 0100 yesterday, pt hx CHF and asthma      HPI     61-year-old male with past medical history of CHF, diabetes, and hypertension who presents for evaluation of chest pain and shortness of breath  He states chest pain started this morning at around 1:00 a m  Robynjessica Rod Pain feels like a pressure sensation  Pain has been constant throughout the day  Pain is not made worse with exertion  He states he has had associated shortness of breath today  Shortness of breath is worse with exertion  He has also been feeling lightheadedness today  He states he has had chills but no fevers  He has had a cough but this is chronic for him  He states he had nausea earlier but denies any vomiting, abdominal pain, or diarrhea  Denies any urinary symptoms  Denies any recent weight gain, worsening orthopnea, or worsening leg swelling  He states he has a cardio mems device which measures his PA pressure  He states he spoke with his cardiologist today he states his pressures have been normal so they do not think this is CHF  Denies hx of DVT or PE  Denies any recent hospitalizations in the past month or prolonged immobilization  Prior to Admission Medications   Prescriptions Last Dose Informant Patient Reported? Taking?    Accu-Chek FastClix Lancets MISC  Self No No   Sig: Test blood sugar twice daily   Blood Glucose Monitoring Suppl (Accu-Chek Guide) w/Device KIT  Self No No   Sig: Use 2 (two) times a day Test blood sugar twice daily   Symbicort 160-4 5 MCG/ACT inhaler  Self No No   Si PUFFS BY MOUTH TWICE DAILY , RINSE MOUTH THEN SPIT AFTER USE   albuterol (PROVENTIL HFA,VENTOLIN HFA) 90 mcg/act inhaler  Self No No   Sig: INHALE 2 PUFFS EVERY 4 (FOUR) HOURS AS NEEDED FOR WHEEZING OR SHORTNESS OF BREATH   amLODIPine (NORVASC) 5 mg tablet  Self No No   Sig: Take 1 tablet (5 mg total) by mouth daily   apixaban (Eliquis) 5 mg  Self No No Sig: Take 1 tablet (5 mg total) by mouth 2 (two) times a day   atorvastatin (LIPITOR) 10 mg tablet  Self No No   Sig: Take 1 tablet (10 mg total) by mouth in the morning     benzonatate (TESSALON PERLES) 100 mg capsule  Self No No   Sig: Take 1 capsule (100 mg total) by mouth 3 (three) times a day as needed (for cough)   bumetanide (BUMEX) 1 mg tablet  Self No No   Sig: Take 3 tablets (3 mg total) by mouth 2 (two) times a day   levalbuterol (Xopenex) 1 25 mg/3 mL nebulizer solution  Self No No   Sig: Take 3 mL (1 25 mg total) by nebulization 3 (three) times a day   Patient taking differently: Take 1 25 mg by nebulization as needed   metoprolol succinate (TOPROL-XL) 50 mg 24 hr tablet  Self No No   Sig: Take 1 tablet (50 mg total) by mouth daily   montelukast (SINGULAIR) 10 mg tablet  Self No No   Sig: Take 1 tablet (10 mg total) by mouth daily at bedtime   potassium chloride (K-DUR,KLOR-CON) 20 mEq tablet  Self No No   Sig: Take 2 tablets (40 mEq total) by mouth 2 (two) times a day   sitaGLIPtin (Januvia) 100 mg tablet  Self No No   Sig: Take 1/2 tab  daily   tiotropium (Spiriva Respimat) 1 25 MCG/ACT AERS inhaler  Self No No   Sig: Inhale 2 puffs daily      Facility-Administered Medications: None       Past Medical History:   Diagnosis Date    Acute gastritis without hemorrhage     last assessed 3/24/17    Asthma     Bilateral leg edema 2/19/2020    [facility]     Daytime sleepiness 7/17/2019    Diabetes mellitus (HCC)     Dyspnea on exertion 10/11/2021    Edema of both feet 1/23/2020    Gastric bypass status for obesity     Hyperlipidemia     Hypertension     Hypokalemia 11/14/2021    Impaired fasting glucose     last assessed 5/10/17    Knee sprain, bilateral 6/14/2018    Leg cramps 6/16/2022    Obesity     Viral gastroenteritis     last assessed 11/4/16       Past Surgical History:   Procedure Laterality Date    CARDIAC CATHETERIZATION N/A 3/9/2022    Procedure: CARDIAC RHC W/ PRESSURE SENSOR;  Surgeon: Paige Mireles MD;  Location: [facility]; Service: Cardiology    CARPAL TUNNEL RELEASE      bilateral    GASTRIC BYPASS      with han en y    HERNIA REPAIR      ventral inscisional    TONSILLECTOMY         Family History   Problem Relation Age of Onset    Stroke Mother     Hypertension Father     Cancer Father     COPD Father      I have reviewed and agree with the history as documented  E-Cigarette/Vaping    E-Cigarette Use Never User      E-Cigarette/Vaping Substances    Nicotine No     THC No     CBD No     Flavoring No     Other No     Unknown No      Social History     Tobacco Use    Smoking status: Former Smoker     Packs/day: 0 01     Years: 11 00     Pack years: 0 11     Types: Pipe, Cigars     Start date:      Quit date:      Years since quittin 6    Smokeless tobacco: Never Used    Tobacco comment: cigar once a day   Vaping Use    Vaping Use: Never used   Substance Use Topics    Alcohol use: Not Currently     Alcohol/week: 2 0 standard drinks     Types: 2 Shots of liquor per week     Comment: social    Drug use: No        Review of Systems   Constitutional: Positive for chills  Negative for appetite change, fever and unexpected weight change  HENT: Negative for congestion, rhinorrhea and sore throat  Respiratory: Positive for cough and shortness of breath  Cardiovascular: Positive for chest pain  Gastrointestinal: Positive for diarrhea  Negative for abdominal pain, nausea and vomiting  Genitourinary: Negative for dysuria, frequency, hematuria and urgency  Musculoskeletal: Negative for arthralgias and myalgias  Skin: Negative for rash  Neurological: Positive for light-headedness  Negative for dizziness, weakness, numbness and headaches  All other systems reviewed and are negative        Physical Exam  ED Triage Vitals   Temperature Pulse Respirations Blood Pressure SpO2   22 1710 09/01/22 1710 09/01/22 1710 09/01/22 1710 09/01/22 1710   98 5 °F (36 9 °C) 88 20 116/75 97 %      Temp Source Heart Rate Source Patient Position - Orthostatic VS BP Location FiO2 (%)   09/01/22 1710 09/01/22 1710 09/01/22 1710 09/01/22 1710 --   Oral Monitor Lying Left arm       Pain Score       09/01/22 2344       4             Orthostatic Vital Signs  Vitals:    09/03/22 1331 09/03/22 1502 09/03/22 1848 09/03/22 2159   BP:  124/70 125/70 126/69   Pulse: 86 74 80 66   Patient Position - Orthostatic VS:           Physical Exam  Vitals and nursing note reviewed  Constitutional:       General: He is not in acute distress  Appearance: Normal appearance  He is well-developed  He is obese  He is not ill-appearing, toxic-appearing or diaphoretic  HENT:      Head: Normocephalic and atraumatic  Right Ear: External ear normal       Left Ear: External ear normal       Nose: Nose normal       Mouth/Throat:      Mouth: Mucous membranes are moist       Pharynx: Oropharynx is clear  Eyes:      Extraocular Movements: Extraocular movements intact  Conjunctiva/sclera: Conjunctivae normal    Cardiovascular:      Rate and Rhythm: Normal rate and regular rhythm  Pulses: Normal pulses  Heart sounds: Normal heart sounds  No murmur heard  No friction rub  No gallop  Pulmonary:      Effort: Pulmonary effort is normal  Tachypnea present  No respiratory distress  Breath sounds: Normal breath sounds  No decreased breath sounds, wheezing, rhonchi or rales  Abdominal:      General: There is no distension  Palpations: Abdomen is soft  Tenderness: There is no abdominal tenderness  There is no guarding or rebound  Musculoskeletal:         General: No tenderness  Cervical back: Neck supple  Right lower leg: No tenderness  No edema  Left lower leg: No tenderness  No edema  Skin:     General: Skin is warm and dry  Coloration: Skin is not pale  Findings: No rash  Neurological:      General: No focal deficit present  Mental Status: He is alert and oriented to person, place, and time  Cranial Nerves: No cranial nerve deficit  Sensory: No sensory deficit  Motor: No weakness     Psychiatric:         Mood and Affect: Mood normal          Behavior: Behavior normal          ED Medications  Medications   amLODIPine (NORVASC) tablet 5 mg (5 mg Oral Given 9/3/22 0804)   apixaban (ELIQUIS) tablet 5 mg ( Oral Canceled Entry 9/3/22 1732)   atorvastatin (LIPITOR) tablet 10 mg (10 mg Oral Given 9/3/22 0804)   benzonatate (TESSALON PERLES) capsule 100 mg (has no administration in time range)   metoprolol succinate (TOPROL-XL) 24 hr tablet 50 mg (50 mg Oral Given 9/3/22 0804)   montelukast (SINGULAIR) tablet 10 mg (10 mg Oral Given 9/3/22 0804)   potassium chloride (K-DUR,KLOR-CON) CR tablet 40 mEq ( Oral Canceled Entry 9/3/22 1732)   budesonide-formoterol (SYMBICORT) 160-4 5 mcg/act inhaler 2 puff ( Inhalation Canceled Entry 9/3/22 1732)   umeclidinium bromide (INCRUSE ELLIPTA) 62 5 mcg/inh inhaler AEPB 1 puff (1 puff Inhalation Given 9/3/22 0805)   insulin lispro (HumaLOG) 100 units/mL subcutaneous injection 1-6 Units (3 Units Subcutaneous Given 9/3/22 1651)   insulin lispro (HumaLOG) 100 units/mL subcutaneous injection 1-5 Units (2 Units Subcutaneous Given 9/3/22 2129)   bumetanide (BUMEX) tablet 3 mg ( Oral Canceled Entry 9/3/22 1732)   levalbuterol (XOPENEX) inhalation solution 1 25 mg (has no administration in time range)   predniSONE tablet 40 mg (has no administration in time range)     Followed by   predniSONE tablet 30 mg (has no administration in time range)     Followed by   predniSONE tablet 20 mg (has no administration in time range)     Followed by   predniSONE tablet 10 mg (has no administration in time range)   insulin glargine (LANTUS) subcutaneous injection 10 Units 0 1 mL (10 Units Subcutaneous Given 9/3/22 2128)   sodium chloride 0 9 % inhalation solution 3 mL (has no administration in time range)   regadenoson (LEXISCAN) 0 4 mg/5 mL injection **ADS Override Pull** (  Given by Other 9/2/22 4736)   regadenoson (LEXISCAN) injection 0 4 mg (0 4 mg Intravenous Given 9/2/22 1556)       Diagnostic Studies  Results Reviewed     Procedure Component Value Units Date/Time    Blood culture #1 [[id number]] Collected: 09/01/22 1942    Lab Status: Preliminary result Specimen: Blood from Arm, Left Updated: 09/03/22 2303     Blood Culture No Growth at 48 hrs  Blood culture #2 [[id number]] Collected: 09/01/22 1942    Lab Status: Preliminary result Specimen: Blood from Arm, Right Updated: 09/03/22 2303     Blood Culture No Growth at 48 hrs      MRSA culture [[id number]]  (Normal) Collected: 09/01/22 2227    Lab Status: Final result Specimen: Nares from Nose Updated: 09/03/22 1016     MRSA Culture Only No Methicillin Resistant Staphlyococcus aureus (MRSA) isolated    Basic metabolic panel [[id number]]  (Abnormal) Collected: 09/02/22 0641    Lab Status: Final result Specimen: Blood from Arm, Left Updated: 09/02/22 0909     Sodium 130 mmol/L      Potassium 3 9 mmol/L      Chloride 94 mmol/L      CO2 27 mmol/L      ANION GAP 9 mmol/L      BUN 28 mg/dL      Creatinine 2 17 mg/dL      Glucose 217 mg/dL      Calcium 8 7 mg/dL      eGFR 33 ml/min/1 73sq m     Narrative:      [place] guidelines for Chronic Kidney Disease (CKD):     Stage 1 with normal or high GFR (GFR > 90 mL/min/1 73 square meters)    Stage 2 Mild CKD (GFR = 60-89 mL/min/1 73 square meters)    Stage 3A Moderate CKD (GFR = 45-59 mL/min/1 73 square meters)    Stage 3B Moderate CKD (GFR = 30-44 mL/min/1 73 square meters)    Stage 4 Severe CKD (GFR = 15-29 mL/min/1 73 square meters)    Stage 5 End Stage CKD (GFR <15 mL/min/1 73 square meters)  Note: GFR calculation is accurate only with a steady state creatinine    Cortisol [[id number]]  (Normal) Collected: 09/02/22 0023    Lab Status: Final result Specimen: Blood from Arm, Right Updated: 09/02/22 0850     Cortisol, Random 35 1 ug/dL     Procalcitonin [[id number]]  (Abnormal) Collected: 09/02/22 0641    Lab Status: Final result Specimen: Blood from Arm, Left Updated: 09/02/22 0833     Procalcitonin 1 48 ng/ml     CBC and differential [[id number]]  (Abnormal) Collected: 09/02/22 0641    Lab Status: Final result Specimen: Blood from Arm, Left Updated: 09/02/22 0827     WBC 6 96 Thousand/uL      RBC 4 20 Million/uL      Hemoglobin 12 6 g/dL      Hematocrit 37 8 %      MCV 90 fL      MCH 30 0 pg      MCHC 33 3 g/dL      RDW 12 6 %      MPV 10 3 fL      Platelets 941 Thousands/uL      nRBC 0 /100 WBCs      Neutrophils Relative 94 %      Immat GRANS % 1 %      Lymphocytes Relative 3 %      Monocytes Relative 2 %      Eosinophils Relative 0 %      Basophils Relative 0 %      Neutrophils Absolute 6 48 Thousands/µL      Immature Grans Absolute 0 07 Thousand/uL      Lymphocytes Absolute 0 23 Thousands/µL      Monocytes Absolute 0 17 Thousand/µL      Eosinophils Absolute 0 00 Thousand/µL      Basophils Absolute 0 01 Thousands/µL     Narrative: This is an appended report  These results have been appended to a previously verified report      Urea nitrogen, urine [[id number]] Collected: 09/02/22 0023    Lab Status: Final result Specimen: Urine, Clean Catch Updated: 09/02/22 0237     Urea Nitrogen, Ur 294 mg/dL     Sodium, urine, random [[id number]] Collected: 09/02/22 0023    Lab Status: Final result Specimen: Urine, Clean Catch Updated: 09/02/22 0234     Sodium, Ur 22    Osmolality, urine [[id number]]  (Normal) Collected: 09/02/22 0023    Lab Status: Final result Specimen: Urine, Clean Catch Updated: 09/02/22 0113     Osmolality, Ur 254 mmol/KG     HS Troponin I 2hr [[id number]]  (Normal) Collected: 09/01/22 2118    Lab Status: Final result Specimen: Blood from Arm, Left Updated: 09/01/22 2208     hs TnI 2hr 20 ng/L      Delta 2hr hsTnI -2 ng/L     Procalcitonin [[id number]]  (Abnormal) Collected: 09/01/22 1942    Lab Status: Final result Specimen: Blood from Arm, Left Updated: 09/01/22 2042     Procalcitonin 1 74 ng/ml     HS Troponin I 4hr [[id number]]  (Normal) Collected: 09/01/22 1942    Lab Status: Final result Specimen: Blood from Arm, Left Updated: 09/01/22 2037     hs TnI 4hr 21 ng/L      Delta 4hr hsTnI -1 ng/L     NT-BNP PRO [[id number]]  (Abnormal) Collected: 09/01/22 1942    Lab Status: Final result Specimen: Blood from Arm, Left Updated: 09/01/22 2021     NT-proBNP 2,545 pg/mL     Osmolality-If this is regarding a toxic alcohol, STOP  Test is not routinely indicated  Please consult medical  on call for further guidance  [[id number]]  (Normal) Collected: 09/01/22 1942    Lab Status: Final result Specimen: Blood from Arm, Left Updated: 09/01/22 2013     Osmolality Serum 296 mmol/KG     Uric acid [[id number]]  (Abnormal) Collected: 09/01/22 1942    Lab Status: Final result Specimen: Blood from Arm, Left Updated: 09/01/22 2011     Uric Acid 12 0 mg/dL     Sputum culture and Gram stain [[id number]]     Lab Status: No result Specimen: Sputum     TSH, 3rd generation with Free T4 reflex [[id number]]  (Normal) Collected: 09/01/22 1721    Lab Status: Final result Specimen: Blood from Arm, Left Updated: 09/01/22 1943     TSH 3RD GENERATON 1 270 uIU/mL     Narrative:      Patients undergoing fluorescein dye angiography may retain small amounts of fluorescein in the body for 48-72 hours post procedure  Samples containing fluorescein can produce falsely depressed TSH values  If the patient had this procedure,a specimen should be resubmitted post fluorescein clearance        FLU/RSV/COVID - if FLU/RSV clinically relevant [[id number]]  (Normal) Collected: 09/01/22 1721    Lab Status: Final result Specimen: Nares from Nose Updated: 09/01/22 1815     SARS-CoV-2 Negative     INFLUENZA A PCR Negative     INFLUENZA B PCR Negative     RSV PCR Negative    Narrative:      FOR PEDIATRIC PATIENTS - copy/paste COVID Guidelines URL to browser: [email or web address]    SARS-CoV-2 assay is a Nucleic Acid Amplification assay intended for the  qualitative detection of nucleic acid from SARS-CoV-2 in nasopharyngeal  swabs  Results are for the presumptive identification of SARS-CoV-2 RNA  Positive results are indicative of infection with SARS-CoV-2, the virus  causing COVID-19, but do not rule out bacterial infection or co-infection  with other viruses  Laboratories within the [place] and its  territories are required to report all positive results to the appropriate  public health authorities  Negative results do not preclude SARS-CoV-2  infection and should not be used as the sole basis for treatment or other  patient management decisions  Negative results must be combined with  clinical observations, patient history, and epidemiological information  This test has not been FDA cleared or approved  This test has been authorized by FDA under an Emergency Use Authorization  (EUA)  This test is only authorized for the duration of time the  declaration that circumstances exist justifying the authorization of the  emergency use of an in vitro diagnostic tests for detection of SARS-CoV-2  virus and/or diagnosis of COVID-19 infection under section 564(b)(1) of  the Act, 21 U  S C  356PCC-0(T)(5), unless the authorization is terminated  or revoked sooner  The test has been validated but independent review by FDA  and CLIA is pending  Test performed using [employer] GeneXpert: This RT-PCR assay targets N2,  a region unique to SARS-CoV-2  A conserved region in the E-gene was chosen  for pan-Sarbecovirus detection which includes SARS-CoV-2      HS Troponin 0hr (reflex protocol) [[id number]]  (Normal) Collected: 09/01/22 1721    Lab Status: Final result Specimen: Blood from Arm, Left Updated: 09/01/22 1800     hs TnI 0hr 22 ng/L     Comprehensive metabolic panel [[id number]]  (Abnormal) Collected: 09/01/22 1721    Lab Status: Final result Specimen: Blood from Arm, Left Updated: 09/01/22 1748     Sodium 128 mmol/L      Potassium 3 5 mmol/L      Chloride 93 mmol/L      CO2 21 mmol/L      ANION GAP 14 mmol/L      BUN 20 mg/dL      Creatinine 1 91 mg/dL      Glucose 149 mg/dL      Calcium 8 2 mg/dL      Corrected Calcium 9 0 mg/dL      AST 44 U/L      ALT 33 U/L      Alkaline Phosphatase 147 U/L      Total Protein 7 1 g/dL      Albumin 3 0 g/dL      Total Bilirubin 1 41 mg/dL      eGFR 38 ml/min/1 73sq m     Narrative:      [place] guidelines for Chronic Kidney Disease (CKD):     Stage 1 with normal or high GFR (GFR > 90 mL/min/1 73 square meters)    Stage 2 Mild CKD (GFR = 60-89 mL/min/1 73 square meters)    Stage 3A Moderate CKD (GFR = 45-59 mL/min/1 73 square meters)    Stage 3B Moderate CKD (GFR = 30-44 mL/min/1 73 square meters)    Stage 4 Severe CKD (GFR = 15-29 mL/min/1 73 square meters)    Stage 5 End Stage CKD (GFR <15 mL/min/1 73 square meters)  Note: GFR calculation is accurate only with a steady state creatinine    CBC and differential [[id number]]  (Abnormal) Collected: 09/01/22 1721    Lab Status: Final result Specimen: Blood from Arm, Left Updated: 09/01/22 1734     WBC 12 39 Thousand/uL      RBC 4 14 Million/uL      Hemoglobin 12 3 g/dL      Hematocrit 37 3 %      MCV 90 fL      MCH 29 7 pg      MCHC 33 0 g/dL      RDW 12 6 %      MPV 10 5 fL      Platelets 844 Thousands/uL      nRBC 0 /100 WBCs      Neutrophils Relative 88 %      Immat GRANS % 1 %      Lymphocytes Relative 4 %      Monocytes Relative 7 %      Eosinophils Relative 0 %      Basophils Relative 0 %      Neutrophils Absolute 10 85 Thousands/µL      Immature Grans Absolute 0 11 Thousand/uL      Lymphocytes Absolute 0 49 Thousands/µL      Monocytes Absolute 0 88 Thousand/µL      Eosinophils Absolute 0 02 Thousand/µL      Basophils Absolute 0 04 Thousands/µL                  XR chest 2 views   Final Result by Vinicio Gonzalez MD (09/02 4720)   No acute cardiopulmonary findings  Workstation performed: [id number]               Procedures  ECG 12 Lead Documentation Only    Date/Time: 9/1/2022 7:09 PM  Performed by: Geraldine Floyd MD  Authorized by: Geraldine Floyd MD     Indications / Diagnosis:  Chest pain  ECG reviewed by me, the ED Provider: yes    Patient location:  ED  Previous ECG:     Previous ECG:  Compared to current    Similarity:  No change    Comparison to cardiac monitor: Yes    Interpretation:     Interpretation: normal    Rate:     ECG rate:  84    ECG rate assessment: normal    Rhythm:     Rhythm: sinus rhythm    Ectopy:     Ectopy: none    QRS:     QRS axis:  Normal    QRS intervals:  Normal  Conduction:     Conduction: normal    ST segments:     ST segments:  Normal  T waves:     T waves: normal            ED Course                                       MDM     51-year-old male with past medical history of CHF, diabetes, and hypertension who presents for evaluation of chest pain and shortness of breath  Differential diagnosis includes: ACS, CHF, pneumonia, COVID  Will obtain cardiac labs, BNP, EKG and CXR  Reviewed CXR, no acute abnormality  Trop 22  CMP shows mild hyponatremia  EKG shows no acute ischemic changes  Reassessed patient, he is still having chest pain  Will admit for ACS rule out  Discussed with patient who is agreeable for admission  Discussed with GREGORY for admission      Disposition  Final diagnoses:   Hyponatremia   Chest pain   Shortness of breath   Lightheadedness     Time reflects when diagnosis was documented in both MDM as applicable and the Disposition within this note     Time User Action Codes Description Comment    9/1/2022  7:17 PM Ralph Purock [E87 1] Hyponatremia     9/1/2022  7:17 PM Chandni Viramontes Add [R07 9] Chest pain     9/1/2022  7:17 PM Chandni Viramontes Add [R06 02] Shortness of breath 9/1/2022  7:17 PM Lisbeatriz Guerreros Add [R42] Lightheadedness     9/1/2022  7:44 PM Friedaaundreaariana Ocampo Add [R08 98] Chronic diastolic heart failure ([facility] [address] )     9/1/2022  7:44 PM Friedaaundreaariana Coumillicent Add [I48 0] Paroxysmal atrial fibrillation [facility])       ED Disposition     ED Disposition   Admit    Condition   Stable    Date/Time   Thu Sep 1, 2022  7:34 PM    Comment   Case was discussed with ADEN and the patient's admission status was agreed to be Admission Status: observation status to the service of Dr Azalea Jimenes  Follow-up Information     Follow up With Specialties Details Why Contact Neeru Riley MD Pulmonology, Pulmonary Disease, Critical Care Medicine, Intensive Care Go on 9/13/2022 @3:20p [address] [place] [address]  [phone]            Current Discharge Medication List      CONTINUE these medications which have NOT CHANGED    Details   Accu-Chek FastClix Lancets MISC Test blood sugar twice daily  Qty: 200 each, Refills: 3    Associated Diagnoses: Type 2 diabetes mellitus with hyperglycemia, without long-term current use of insulin ([facility])      albuterol (PROVENTIL HFA,VENTOLIN HFA) 90 mcg/act inhaler INHALE 2 PUFFS EVERY 4 (FOUR) HOURS AS NEEDED FOR WHEEZING OR SHORTNESS OF BREATH  Qty: 18 g, Refills: 3    Associated Diagnoses: Mild intermittent asthma without complication      amLODIPine (NORVASC) 5 mg tablet Take 1 tablet (5 mg total) by mouth daily  Qty: 30 tablet, Refills: 0    Associated Diagnoses: Essential hypertension      apixaban (Eliquis) 5 mg Take 1 tablet (5 mg total) by mouth 2 (two) times a day  Qty: 60 tablet, Refills: 5    Associated Diagnoses: A-fib ([facility] [address] ); Atrial fibrillation, unspecified type ([facility])      atorvastatin (LIPITOR) 10 mg tablet Take 1 tablet (10 mg total) by mouth in the morning    Qty: 30 tablet, Refills: 5    Associated Diagnoses: Mixed hyperlipidemia      benzonatate (TESSALON PERLES) 100 mg capsule Take 1 capsule (100 mg total) by mouth 3 (three) times a day as needed (for cough)  Qty: 30 capsule, Refills: 0    Associated Diagnoses: Moderate persistent asthma, unspecified whether complicated      Blood Glucose Monitoring Suppl (Accu-Chek Guide) w/Device KIT Use 2 (two) times a day Test blood sugar twice daily  Qty: 1 kit, Refills: 0    Associated Diagnoses: Type 2 diabetes mellitus with hyperglycemia, without long-term current use of insulin ([facility])      bumetanide (BUMEX) 1 mg tablet Take 3 tablets (3 mg total) by mouth 2 (two) times a day  Qty: 180 tablet, Refills: 0    Associated Diagnoses: Acute on chronic heart failure with preserved ejection fraction ([facility])      levalbuterol (Xopenex) 1 25 mg/3 mL nebulizer solution Take 3 mL (1 25 mg total) by nebulization 3 (three) times a day  Qty: 270 mL, Refills: 3    Associated Diagnoses: Mild intermittent asthma without complication      metoprolol succinate (TOPROL-XL) 50 mg 24 hr tablet Take 1 tablet (50 mg total) by mouth daily  Qty: 90 tablet, Refills: 1    Associated Diagnoses: Chronic heart failure with preserved ejection fraction ([facility])      montelukast (SINGULAIR) 10 mg tablet Take 1 tablet (10 mg total) by mouth daily at bedtime  Qty: 90 tablet, Refills: 1    Associated Diagnoses: Moderate persistent asthma with acute exacerbation      potassium chloride (K-DUR,KLOR-CON) 20 mEq tablet Take 2 tablets (40 mEq total) by mouth 2 (two) times a day  Qty: 120 tablet, Refills: 5    Associated Diagnoses: Hypokalemia      sitaGLIPtin (Januvia) 100 mg tablet Take 1/2 tab  daily  Qty: 30 tablet, Refills: 5    Associated Diagnoses: Type 2 diabetes mellitus with stage 3b chronic kidney disease, without long-term current use of insulin ([facility])      Symbicort 160-4 5 MCG/ACT inhaler 2 PUFFS BY MOUTH TWICE DAILY , RINSE MOUTH THEN SPIT AFTER USE  Qty: 30 6 g, Refills: 2    Associated Diagnoses:  Moderate persistent asthma with acute exacerbation      tiotropium (Spiriva Respimat) 1 25 MCG/ACT AERS inhaler Inhale 2 puffs daily  Qty: 4 g, Refills: 3    Associated Diagnoses: Moderate persistent asthma with acute exacerbation           No discharge procedures on file  PDMP Review       Value Time User    PDMP Reviewed  Yes 9/23/2021 12:07 AM Angela Brink DO           ED Provider  Attending physically available and evaluated Grant Patel I managed the patient along with the ED Attending      Electronically Signed by         Dony Desai MD  09/04/22 4419

## 2022-09-01 NOTE — Clinical Note
Case was discussed with ADEN and the patient's admission status was agreed to be Admission Status: observation status to the service of Dr Lord Ledbetter

## 2022-09-01 NOTE — TELEPHONE ENCOUNTER
JAGRUTI- P/kira'kayleen , states last night he had cp, sob, severe chills, lightheaded and dizzy  When ask he said he feels like he has a fever  Cardio mems has been in range the last 4 days  He said he will go to ER when his wife gets home

## 2022-09-02 ENCOUNTER — APPOINTMENT (INPATIENT)
Dept: NON INVASIVE DIAGNOSTICS | Facility: HOSPITAL | Age: 57
DRG: 202 | End: 2022-09-02
Payer: COMMERCIAL

## 2022-09-02 ENCOUNTER — APPOINTMENT (OUTPATIENT)
Dept: RADIOLOGY | Facility: HOSPITAL | Age: 57
DRG: 202 | End: 2022-09-02
Payer: COMMERCIAL

## 2022-09-02 LAB
ANION GAP SERPL CALCULATED.3IONS-SCNC: 9 MMOL/L (ref 4–13)
ATRIAL RATE: 81 BPM
ATRIAL RATE: 84 BPM
ATRIAL RATE: 95 BPM
BASOPHILS # BLD AUTO: 0.01 THOUSANDS/ΜL (ref 0–0.1)
BASOPHILS NFR BLD AUTO: 0 % (ref 0–1)
BUN SERPL-MCNC: 28 MG/DL (ref 5–25)
CALCIUM SERPL-MCNC: 8.7 MG/DL (ref 8.3–10.1)
CARDIAC TROPONIN I PNL SERPL HS: 12 NG/L (ref 8–18)
CHEST PAIN STATEMENT: NORMAL
CHLORIDE SERPL-SCNC: 94 MMOL/L (ref 96–108)
CO2 SERPL-SCNC: 27 MMOL/L (ref 21–32)
CORTIS SERPL-MCNC: 35.1 UG/DL
CREAT SERPL-MCNC: 2.17 MG/DL (ref 0.6–1.3)
EOSINOPHIL # BLD AUTO: 0 THOUSAND/ΜL (ref 0–0.61)
EOSINOPHIL NFR BLD AUTO: 0 % (ref 0–6)
ERYTHROCYTE [DISTWIDTH] IN BLOOD BY AUTOMATED COUNT: 12.6 % (ref 11.6–15.1)
GFR SERPL CREATININE-BSD FRML MDRD: 33 ML/MIN/1.73SQ M
GLUCOSE SERPL-MCNC: 217 MG/DL (ref 65–140)
GLUCOSE SERPL-MCNC: 238 MG/DL (ref 65–140)
GLUCOSE SERPL-MCNC: 240 MG/DL (ref 65–140)
GLUCOSE SERPL-MCNC: 267 MG/DL (ref 65–140)
GLUCOSE SERPL-MCNC: 299 MG/DL (ref 65–140)
HCT VFR BLD AUTO: 37.8 % (ref 36.5–49.3)
HGB BLD-MCNC: 12.6 G/DL (ref 12–17)
IMM GRANULOCYTES # BLD AUTO: 0.07 THOUSAND/UL (ref 0–0.2)
IMM GRANULOCYTES NFR BLD AUTO: 1 % (ref 0–2)
LYMPHOCYTES # BLD AUTO: 0.23 THOUSANDS/ΜL (ref 0.6–4.47)
LYMPHOCYTES NFR BLD AUTO: 3 % (ref 14–44)
MAX DIASTOLIC BP: 65 MMHG
MAX HEART RATE: 117 BPM
MAX HR PERCENT: 70 %
MAX HR: 104 BPM
MAX PREDICTED HEART RATE: 165 BPM
MAX. SYSTOLIC BP: 141 MMHG
MCH RBC QN AUTO: 30 PG (ref 26.8–34.3)
MCHC RBC AUTO-ENTMCNC: 33.3 G/DL (ref 31.4–37.4)
MCV RBC AUTO: 90 FL (ref 82–98)
MONOCYTES # BLD AUTO: 0.17 THOUSAND/ΜL (ref 0.17–1.22)
MONOCYTES NFR BLD AUTO: 2 % (ref 4–12)
NEUTROPHILS # BLD AUTO: 6.48 THOUSANDS/ΜL (ref 1.85–7.62)
NEUTS SEG NFR BLD AUTO: 94 % (ref 43–75)
NRBC BLD AUTO-RTO: 0 /100 WBCS
NUC STRESS EJECTION FRACTION: 53 %
OSMOLALITY UR: 254 MMOL/KG
P AXIS: 150 DEGREES
P AXIS: 36 DEGREES
P AXIS: 44 DEGREES
PLATELET # BLD AUTO: 212 THOUSANDS/UL (ref 149–390)
PMV BLD AUTO: 10.3 FL (ref 8.9–12.7)
POTASSIUM SERPL-SCNC: 3.9 MMOL/L (ref 3.5–5.3)
PR INTERVAL: 142 MS
PR INTERVAL: 148 MS
PR INTERVAL: 148 MS
PROCALCITONIN SERPL-MCNC: 1.48 NG/ML
PROTOCOL NAME: NORMAL
QRS AXIS: 35 DEGREES
QRS AXIS: 39 DEGREES
QRS AXIS: 52 DEGREES
QRSD INTERVAL: 82 MS
QRSD INTERVAL: 82 MS
QRSD INTERVAL: 88 MS
QT INTERVAL: 362 MS
QT INTERVAL: 392 MS
QT INTERVAL: 404 MS
QTC INTERVAL: 454 MS
QTC INTERVAL: 463 MS
QTC INTERVAL: 469 MS
RATE PRESSURE PRODUCT: NORMAL
RBC # BLD AUTO: 4.2 MILLION/UL (ref 3.88–5.62)
REASON FOR TERMINATION: NORMAL
SL CV REST NUCLEAR ISOTOPE DOSE: 16.3 MCI
SL CV STRESS NUCLEAR ISOTOPE DOSE: 48.9 MCI
SL CV STRESS RECOVERY BP: NORMAL MMHG
SL CV STRESS RECOVERY HR: 98 BPM
SL CV STRESS RECOVERY O2 SAT: 99 %
SODIUM 24H UR-SCNC: 22 MOL/L
SODIUM SERPL-SCNC: 130 MMOL/L (ref 135–147)
STRESS ANGINA INDEX: 1
STRESS BASELINE BP: NORMAL MMHG
STRESS BASELINE HR: 86 BPM
STRESS DUKE TREADMILL SCORE: -1
STRESS O2 SAT REST: 98 %
STRESS PEAK HR: 104 BPM
STRESS POST ESTIMATED WORKLOAD: 1 METS
STRESS POST EXERCISE DUR MIN: 3 MIN
STRESS POST EXERCISE DUR SEC: 0 SEC
STRESS POST O2 SAT PEAK: 98 %
STRESS POST PEAK BP: 135 MMHG
STRESS ST DEPRESSION: 0 MM
STRESS/REST PERFUSION RATIO: 1.09
T WAVE AXIS: 29 DEGREES
T WAVE AXIS: 69 DEGREES
T WAVE AXIS: 71 DEGREES
TARGET HR FORMULA: NORMAL
TEST INDICATION: NORMAL
TIME IN EXERCISE PHASE: NORMAL
UUN 24H UR-MCNC: 294 MG/DL
VENTRICULAR RATE: 81 BPM
VENTRICULAR RATE: 84 BPM
VENTRICULAR RATE: 95 BPM
WBC # BLD AUTO: 6.96 THOUSAND/UL (ref 4.31–10.16)

## 2022-09-02 PROCEDURE — 93005 ELECTROCARDIOGRAM TRACING: CPT

## 2022-09-02 PROCEDURE — 93010 ELECTROCARDIOGRAM REPORT: CPT | Performed by: INTERNAL MEDICINE

## 2022-09-02 PROCEDURE — 93018 CV STRESS TEST I&R ONLY: CPT | Performed by: INTERNAL MEDICINE

## 2022-09-02 PROCEDURE — 83935 ASSAY OF URINE OSMOLALITY: CPT | Performed by: INTERNAL MEDICINE

## 2022-09-02 PROCEDURE — 94760 N-INVAS EAR/PLS OXIMETRY 1: CPT

## 2022-09-02 PROCEDURE — 84300 ASSAY OF URINE SODIUM: CPT | Performed by: INTERNAL MEDICINE

## 2022-09-02 PROCEDURE — 94150 VITAL CAPACITY TEST: CPT

## 2022-09-02 PROCEDURE — 97166 OT EVAL MOD COMPLEX 45 MIN: CPT

## 2022-09-02 PROCEDURE — 93016 CV STRESS TEST SUPVJ ONLY: CPT | Performed by: INTERNAL MEDICINE

## 2022-09-02 PROCEDURE — 82948 REAGENT STRIP/BLOOD GLUCOSE: CPT

## 2022-09-02 PROCEDURE — 82533 TOTAL CORTISOL: CPT | Performed by: INTERNAL MEDICINE

## 2022-09-02 PROCEDURE — 78452 HT MUSCLE IMAGE SPECT MULT: CPT | Performed by: INTERNAL MEDICINE

## 2022-09-02 PROCEDURE — 99254 IP/OBS CNSLTJ NEW/EST MOD 60: CPT | Performed by: INTERNAL MEDICINE

## 2022-09-02 PROCEDURE — G1004 CDSM NDSC: HCPCS

## 2022-09-02 PROCEDURE — 85025 COMPLETE CBC W/AUTO DIFF WBC: CPT | Performed by: INTERNAL MEDICINE

## 2022-09-02 PROCEDURE — 80048 BASIC METABOLIC PNL TOTAL CA: CPT | Performed by: INTERNAL MEDICINE

## 2022-09-02 PROCEDURE — 93017 CV STRESS TEST TRACING ONLY: CPT

## 2022-09-02 PROCEDURE — 84540 ASSAY OF URINE/UREA-N: CPT | Performed by: INTERNAL MEDICINE

## 2022-09-02 PROCEDURE — A9502 TC99M TETROFOSMIN: HCPCS

## 2022-09-02 PROCEDURE — 97162 PT EVAL MOD COMPLEX 30 MIN: CPT

## 2022-09-02 PROCEDURE — 99232 SBSQ HOSP IP/OBS MODERATE 35: CPT | Performed by: HOSPITALIST

## 2022-09-02 PROCEDURE — 94640 AIRWAY INHALATION TREATMENT: CPT

## 2022-09-02 PROCEDURE — 84484 ASSAY OF TROPONIN QUANT: CPT | Performed by: NURSE PRACTITIONER

## 2022-09-02 PROCEDURE — 84145 PROCALCITONIN (PCT): CPT | Performed by: INTERNAL MEDICINE

## 2022-09-02 PROCEDURE — 78452 HT MUSCLE IMAGE SPECT MULT: CPT

## 2022-09-02 RX ORDER — AMINOPHYLLINE DIHYDRATE 25 MG/ML
INJECTION, SOLUTION INTRAVENOUS
Status: DISCONTINUED
Start: 2022-09-02 | End: 2022-09-02 | Stop reason: WASHOUT

## 2022-09-02 RX ORDER — SODIUM CHLORIDE FOR INHALATION 0.9 %
3 VIAL, NEBULIZER (ML) INHALATION
Status: DISCONTINUED | OUTPATIENT
Start: 2022-09-02 | End: 2022-09-03

## 2022-09-02 RX ADMIN — INSULIN LISPRO 3 UNITS: 100 INJECTION, SOLUTION INTRAVENOUS; SUBCUTANEOUS at 11:52

## 2022-09-02 RX ADMIN — POTASSIUM CHLORIDE 40 MEQ: 1500 TABLET, EXTENDED RELEASE ORAL at 08:47

## 2022-09-02 RX ADMIN — MONTELUKAST 10 MG: 10 TABLET, FILM COATED ORAL at 08:47

## 2022-09-02 RX ADMIN — BUDESONIDE AND FORMOTEROL FUMARATE DIHYDRATE 2 PUFF: 160; 4.5 AEROSOL RESPIRATORY (INHALATION) at 08:49

## 2022-09-02 RX ADMIN — INSULIN GLARGINE 7 UNITS: 100 INJECTION, SOLUTION SUBCUTANEOUS at 22:22

## 2022-09-02 RX ADMIN — UMECLIDINIUM 1 PUFF: 62.5 AEROSOL, POWDER ORAL at 08:49

## 2022-09-02 RX ADMIN — POTASSIUM CHLORIDE 40 MEQ: 1500 TABLET, EXTENDED RELEASE ORAL at 17:46

## 2022-09-02 RX ADMIN — ATORVASTATIN CALCIUM 10 MG: 10 TABLET, FILM COATED ORAL at 08:48

## 2022-09-02 RX ADMIN — LEVALBUTEROL HYDROCHLORIDE 1.25 MG: 1.25 SOLUTION, CONCENTRATE RESPIRATORY (INHALATION) at 07:48

## 2022-09-02 RX ADMIN — ISODIUM CHLORIDE 3 ML: 0.03 SOLUTION RESPIRATORY (INHALATION) at 07:48

## 2022-09-02 RX ADMIN — METOPROLOL SUCCINATE 50 MG: 50 TABLET, EXTENDED RELEASE ORAL at 08:47

## 2022-09-02 RX ADMIN — CEFTRIAXONE SODIUM 1000 MG: 10 INJECTION, POWDER, FOR SOLUTION INTRAVENOUS at 21:16

## 2022-09-02 RX ADMIN — INSULIN LISPRO 4 UNITS: 100 INJECTION, SOLUTION INTRAVENOUS; SUBCUTANEOUS at 17:48

## 2022-09-02 RX ADMIN — APIXABAN 5 MG: 5 TABLET, FILM COATED ORAL at 08:47

## 2022-09-02 RX ADMIN — ALBUTEROL SULFATE 2 PUFF: 90 AEROSOL, METERED RESPIRATORY (INHALATION) at 06:42

## 2022-09-02 RX ADMIN — INSULIN LISPRO 3 UNITS: 100 INJECTION, SOLUTION INTRAVENOUS; SUBCUTANEOUS at 08:49

## 2022-09-02 RX ADMIN — REGADENOSON: 0.08 INJECTION, SOLUTION INTRAVENOUS at 17:47

## 2022-09-02 RX ADMIN — BUMETANIDE 3 MG: 2 TABLET ORAL at 17:46

## 2022-09-02 RX ADMIN — PREDNISONE 40 MG: 20 TABLET ORAL at 08:47

## 2022-09-02 RX ADMIN — LEVALBUTEROL HYDROCHLORIDE 1.25 MG: 1.25 SOLUTION, CONCENTRATE RESPIRATORY (INHALATION) at 19:24

## 2022-09-02 RX ADMIN — APIXABAN 5 MG: 5 TABLET, FILM COATED ORAL at 17:46

## 2022-09-02 RX ADMIN — BUMETANIDE 3 MG: 2 TABLET ORAL at 08:47

## 2022-09-02 RX ADMIN — AMLODIPINE BESYLATE 5 MG: 5 TABLET ORAL at 08:47

## 2022-09-02 RX ADMIN — REGADENOSON 0.4 MG: 0.08 INJECTION, SOLUTION INTRAVENOUS at 15:56

## 2022-09-02 RX ADMIN — LEVALBUTEROL HYDROCHLORIDE 1.25 MG: 1.25 SOLUTION, CONCENTRATE RESPIRATORY (INHALATION) at 13:58

## 2022-09-02 RX ADMIN — ISODIUM CHLORIDE 3 ML: 0.03 SOLUTION RESPIRATORY (INHALATION) at 19:24

## 2022-09-02 RX ADMIN — ISODIUM CHLORIDE 3 ML: 0.03 SOLUTION RESPIRATORY (INHALATION) at 13:58

## 2022-09-02 RX ADMIN — INSULIN LISPRO 2 UNITS: 100 INJECTION, SOLUTION INTRAVENOUS; SUBCUTANEOUS at 22:22

## 2022-09-02 RX ADMIN — BUDESONIDE AND FORMOTEROL FUMARATE DIHYDRATE 2 PUFF: 160; 4.5 AEROSOL RESPIRATORY (INHALATION) at 17:48

## 2022-09-02 NOTE — ED NOTES
Spoke with P7 about a tele box for this pt  P7 states no tele box is available at this time       Shayne Cancnio  09/01/22 8099

## 2022-09-02 NOTE — ASSESSMENT & PLAN NOTE
· Associated with shortness of breath  Mild wheezing on exam   · this has been ongoing intermittently for quite some time  · Unclear etiology  · rule out ACS -> Nuclear stress test today  · Does not appear in volume overload state    · Possible poorly controlled asthma  · ISATU score 1  · troponin normal  · EKG nonischemic  · Last echo showed EF of 55% with grade 1 diastolic dysfunction  · Cont prednisone for asthma at present  · PT OT rec cardiac rehab OP

## 2022-09-02 NOTE — CONSULTS
Advanced Heart Failure/Pulmonary Hypertension Consult Note - Raul Jarrell 56 y o  male MRN: [id number]    Unit/Bed#: [facility] 710-01 Encounter: [id number]      Assessment:    Active Problems:    Essential hypertension    Type 2 diabetes mellitus, without long-term current use of insulin (HCC)    Hyponatremia    Moderate persistent asthma    Chronic diastolic heart failure (HCC)    Stage 3a chronic kidney disease (HCC)    Paroxysmal atrial fibrillation (HCC)    Chest pain and shortness of breath    Chills and leukocytosis      Plan:  Chest pain  Unclear etiology  Possibly respiratory related to asthma, +/- viral illness,  versus ischemic etiology, though symptoms seem inconsistent with typical angina    Checking nuclear stress test today  Troponins negative  EKG WNL and unchanged from prior tracings  Chronic HFpEF   Volume :Bumex 3 mg BID  HTN: Amlodipine 5 mg daily  Compensated on exam today and most recent cardiomems readings at goal  Will continue for now on usual oral diuretic regimen  Check BMP in AM  Elevated NT Pro BNP may be r/t ANTONIO, +/- possible acute viral infection  Plan to check nuclear stress test this admit     NT Pro BNP 2545                      Lab Results   Component Value Date     NTBNP 619 (H) 11/09/2021      RHC 3/9/22:    RA 4    RV 35/4    PA 35/12/21    PCWP 10    PA sat 64%    Travis CO 5 56 L/min    Travis CI 2 2L/min/m2    PVR 1 97 SAGASTUME       TTE 11/12/21: LVEF 55%, LVIDd 6 cm,  grade I DD, RV normal, basal inferior and inferolateral walls hypokinetic, trace TR  TTE 7/20201: LVEF 50%, LVIDd 6 1 cm,  no RWMA, akinesis of basal inferior and basal mid inferolateral walls, trace MR, trace TR, IVC mildly dilated, RV normal  TTE 3/2020: LVEF 40-45%, LVIDd 6 12 cm,  RV normal, no MR/TR, no effusions,      Atrial fibrillation    [id number] = 3 (HF, HTN, DM)      Diagnosed 02/2022     Anticoagulation on Eliquis      Rate control: metoprolol as above  Rhythm control: No      HTN   Well controlled  Type II DM  A1C 5 7 %  HLD  FLP 10/1/21: , TG 90, HDL 66, LDL 73  On atorvastatin 10 mg daily  Chronic respiratory insufficiency  -PFTs 7/19/21:   Interpretation:  Severe obstructive airflow defect on spirometry  Significant improvement in airflow or forced vital capacity in response to the administration of bronchodilator per ATS standards  Air trapping as indicated by the lung volumes  Mild decrease in diffusion capacity  Flow-volume loop consistent with obstruction     CKD  Baseline creat around 1 6  1 8 now up to 2 17 Continue to monitor daily BMP  Moderate persistent asthma    Morbid obesity  VICKY  CPAP on recall  Working on getting new equipment  Tobacco abuse  Occasional cigar  Quit 1 year ago       HPI:  56year old known to our service who called our office yesterday with complaints of sudden chest pain that woke him out of sleep  Also with reports of subjective fevers, chills and mild joint aches  Due to persistent chest pain, patient felt it best to be evaluated in the ED  On admission to the ED, hemodynamics remained stable  Renal function showed mildly elevated creat, NT Pro BNP and Procal  Hyponatremic with Na of 128  Troponins negative  His weight count is slightly elevated but with no evidence of acute disease on CXR  In SR on telemetry  No EKG changes noted from prior tracings  ON my examination today patient is resting in bed in no distress  He has no reports of cough or congestion  There have been no fevers  His appetite is normal  Denies any recent sick contacts  His SOB is at baseline however he continues with 4/10 chest pressure which has been constant for days  Continues with negative troponin and without any acute EKG changes  Patient was recently seen in the outpatient office  Clinically stable at the time  Went for nuclear stress test last week, however unable to lay supine and so test was cancelled  He is scheduled to attempt this again while in house today        Past Medical History:   Diagnosis Date    Acute gastritis without hemorrhage     last assessed 3/24/17    Asthma     CHF (congestive heart failure) ([facility])     Diabetes mellitus ([facility] [address] )     Gastric bypass status for obesity     Hyperlipidemia     Hypertension     Impaired fasting glucose     last assessed 5/10/17    Obesity     Viral gastroenteritis     last assessed 11/4/16       Point review of systems negative other than that stated in HPI    Allergies   Allergen Reactions    Azithromycin Other (See Comments)     Shaky, uneasy feeling     Bactrim [Sulfamethoxazole-Trimethoprim] Other (See Comments)     shakiness         Current Facility-Administered Medications:     albuterol (PROVENTIL HFA,VENTOLIN HFA) inhaler 2 puff, 2 puff, Inhalation, Q4H PRN, Tova Richardson MD, 2 puff at 09/02/22 0642    amLODIPine (NORVASC) tablet 5 mg, 5 mg, Oral, Daily, Tova Richardson MD, 5 mg at 09/02/22 0847    apixaban (ELIQUIS) tablet 5 mg, 5 mg, Oral, BID, Tova Richardson MD, 5 mg at 09/02/22 0847    atorvastatin (LIPITOR) tablet 10 mg, 10 mg, Oral, Daily, Tova Richardson MD, 10 mg at 09/02/22 0848    benzonatate (TESSALON PERLES) capsule 100 mg, 100 mg, Oral, TID PRN, Tova Richardson MD    budesonide-formoterol [facility]) 160-4 5 mcg/act inhaler 2 puff, 2 puff, Inhalation, BID, Tova Richardson MD, 2 puff at 09/02/22 0849    bumetanide (BUMEX) tablet 3 mg, 3 mg, Oral, BID, Tova Richardson MD, 3 mg at 09/02/22 0847    ceftriaxone (ROCEPHIN) 1 g/50 mL in dextrose IVPB, 1,000 mg, Intravenous, Q24H, Tova Richardson MD, Last Rate: 100 mL/hr at 09/01/22 2123, 1,000 mg at 09/01/22 2123    insulin glargine (LANTUS) subcutaneous injection 7 Units 0 07 mL, 7 Units, Subcutaneous, HS, Tova Richardson MD, 7 Units at 09/01/22 2122    insulin lispro (HumaLOG) 100 units/mL subcutaneous injection 1-5 Units, 1-5 Units, Subcutaneous, HS, Tova Richardson MD    insulin lispro (HumaLOG) 100 units/mL subcutaneous injection 1-6 Units, 1-6 Units, Subcutaneous, TID AC, 3 Units at 22 0849 **AND** Fingerstick Glucose (POCT), , , TID AC, Parth Ochoa MD    levalbuterol [facility]) inhalation solution 1 25 mg, 1 25 mg, Nebulization, TID, Parth Ochoa MD, 1 25 mg at 22 0748    metoprolol succinate (TOPROL-XL) 24 hr tablet 50 mg, 50 mg, Oral, Daily, Parth Ochoa MD, 50 mg at 22 0847    montelukast (SINGULAIR) tablet 10 mg, 10 mg, Oral, HS, Parth Ochoa MD, 10 mg at 22 0847    potassium chloride (K-DUR,KLOR-CON) CR tablet 40 mEq, 40 mEq, Oral, BID, Parth Ochoa MD, 40 mEq at 22 0847    predniSONE tablet 40 mg, 40 mg, Oral, Daily, Parth Ochoa MD, 40 mg at 22 0847    sodium chloride 0 9 % inhalation solution 3 mL, 3 mL, Nebulization, TID, Parth Ochoa MD, 3 mL at 22 0748    umeclidinium bromide (INCRUSE ELLIPTA) 62 5 mcg/inh inhaler AEPB 1 puff, 1 puff, Inhalation, Daily, Parth Ochoa MD, 1 puff at 22 0849    Social History     Socioeconomic History    Marital status: /Civil Union     Spouse name: Not on file    Number of children: Not on file    Years of education: Not on file    Highest education level: Not on file   Occupational History    Not on file   Tobacco Use    Smoking status: Former Smoker     Packs/day: 0 01     Years: 11 00     Pack years: 0 11     Types: Pipe, Cigars     Start date:      Quit date: 2020     Years since quittin 6    Smokeless tobacco: Never Used    Tobacco comment: cigar once a day   Vaping Use    Vaping Use: Never used   Substance and Sexual Activity    Alcohol use: Not Currently     Alcohol/week: 2 0 standard drinks     Types: 2 Shots of liquor per week     Comment: social    Drug use: No    Sexual activity: Yes     Partners: Female     Birth control/protection: None   Other Topics Concern    Not on file   Social History Narrative    Not on file     Social Determinants of Health     Financial Resource Strain: Not on file   Food Insecurity: No Food Insecurity    Worried About Running Out of Food in the Last Year: Never true    Ran Out of Food in the Last Year: Never true   Transportation Needs: No Transportation Needs    Lack of Transportation (Medical): No    Lack of Transportation (Non-Medical): No   Physical Activity: Not on file   Stress: Not on file   Social Connections: Not on file   Intimate Partner Violence: Not on file   Housing Stability: Unknown    Unable to Pay for Housing in the Last Year: No    Number of Places Lived in the Last Year: Not on file    Unstable Housing in the Last Year: No       Family History   Problem Relation Age of Onset    Stroke Mother     Hypertension Father     Cancer Father     COPD Father        Physical Exam:  Central Line (day, reason): Cotton catheter (day, reason):    Vitals: Blood pressure 121/78, pulse 92, temperature (!) 97 4 °F (36 3 °C), resp  rate 18, weight (!) 139 kg (306 lb), SpO2 95 %  , Body mass index is 41 5 kg/m² , I/O last 3 completed shifts:  In: -   Out: 200 [Urine:200]  No intake/output data recorded  Wt Readings from Last 3 Encounters:   09/01/22 (!) 139 kg (306 lb)   08/17/22 (!) 142 kg (312 lb 6 4 oz)   08/08/22 (!) 142 kg (312 lb)       Intake/Output Summary (Last 24 hours) at 9/2/2022 1038  Last data filed at 9/2/2022 0001  Gross per 24 hour   Intake --   Output 200 ml   Net -200 ml     I/O last 3 completed shifts:  In: -   Out: 200 [Urine:200]    No significant arrhythmias seen on telemetry review       Vitals:    09/02/22 0732 09/02/22 0751 09/02/22 0812 09/02/22 0813   BP: 131/80  131/79 121/78   Pulse: 92 94 92    Resp: 18      Temp: (!) 97 4 °F (36 3 °C)      TempSrc:       SpO2: 94% 98% 95%    Weight:           GEN: Amanuel Knows appears well, alert and oriented x 3, pleasant and cooperative   HEENT: pupils equal, round, and reactive to light; extraocular muscles intact  NECK: supple, no carotid bruits   HEART: regular rhythm, normal S1 and S2, no murmurs, clicks, gallops or rubs, JVP is flat   LUNGS: clear to auscultation bilaterally; no wheezes, rales, or rhonchi   ABDOMEN: normal bowel sounds, soft, no tenderness, no distention  EXTREMITIES: peripheral pulses normal; no clubbing, cyanosis, or edema  NEURO: no focal findings   SKIN: normal without suspicious lesions on exposed skin    Labs & Results:        Results from last 7 days   Lab Units 09/02/22  0641 09/01/22  1721   WBC Thousand/uL 6 96 12 39*   HEMOGLOBIN g/dL 12 6 12 3   HEMATOCRIT % 37 8 37 3   PLATELETS Thousands/uL 212 239         Results from last 7 days   Lab Units 09/02/22  0641 09/01/22  1721   POTASSIUM mmol/L 3 9 3 5   CHLORIDE mmol/L 94* 93*   CO2 mmol/L 27 21   BUN mg/dL 28* 20   CREATININE mg/dL 2 17* 1 91*   CALCIUM mg/dL 8 7 8 2*   ALK PHOS U/L  --  147*   ALT U/L  --  33   AST U/L  --  44         Chest X-Ray is obtained; result - reviewed  EKG personally reviewed by RODRIGUE Neal  Counseling / Coordination of Care  Total floor / unit time spent today 40 minutes  Greater than 50% of total time was spent with the patient and / or family counseling and / or coordination of care  A description of the counseling / coordination of care: 20  Thank you for the opportunity to participate in the care of this patient  Carpi Lion

## 2022-09-02 NOTE — RESTORATIVE TECHNICIAN NOTE
Restorative Technician Note      Patient Name: Lindseya Car     Note Type: Mobility  Patient Position Upon Consult: Seated edge of bed  Activity Performed: Ambulated; Stood; Dangled  Assistive Device: Other (Comment) (none)  Education Provided: Yes  Patient Position at End of Consult: Seated edge of bed;  All needs within reach    Arian VERGARA, Restorative Technician, [employer]

## 2022-09-02 NOTE — ASSESSMENT & PLAN NOTE
· Patient reports of having chills and increased clear phlegm production  · Chest x-ray does not seem to have focal consolidation, official read pending  · Mild leukocytosis noted  PLAN:  · Check blood cultures  · Check procalcitonin, if elevated, consider starting antibiotics (ceftriaxone) for pneumonia coverage  · Follow chest x-ray read    · Trend CBC with differential

## 2022-09-02 NOTE — PLAN OF CARE
Problem: PAIN - ADULT  Goal: Verbalizes/displays adequate comfort level or baseline comfort level  Description: Interventions:  - Encourage patient to monitor pain and request assistance  - Assess pain using appropriate pain scale  - Administer analgesics based on type and severity of pain and evaluate response  - Implement non-pharmacological measures as appropriate and evaluate response  - Consider cultural and social influences on pain and pain management  - Notify physician/advanced practitioner if interventions unsuccessful or patient reports new pain  Outcome: Progressing     Problem: Potential for Falls  Goal: Patient will remain free of falls  Description: INTERVENTIONS:  - Educate patient/family on patient safety including physical limitations  - Instruct patient to call for assistance with activity   - Consult OT/PT to assist with strengthening/mobility   - Keep Call bell within reach  - Keep bed low and locked with side rails adjusted as appropriate  - Keep care items and personal belongings within reach  - Initiate and maintain comfort round  - Apply yellow socks and bracelet for high fall risk patients  - Consider moving patient to room near nurses station  Outcome: Progressing

## 2022-09-02 NOTE — ASSESSMENT & PLAN NOTE
· Unclear etiology  · Euvolemic on exam   · Check urine and serum osmolality  · Check urine sodium and urea  Urine sodium will not be reliable  in setting of diuretic use  · Check TSH and uric acid  · Check random cortisol prior to steroid dose  · Continue home dose Bumex    · Fluid restriction  · Trend BMP

## 2022-09-02 NOTE — ASSESSMENT & PLAN NOTE
Lab Results   Component Value Date    HGBA1C 5 7 06/16/2022       No results for input(s): POCGLU in the last 72 hours  Blood Sugar Average: Last 72 hrs:     Hold home dose sitagliptin while in hospital   Insulin sliding scale Accu-Cheks  Add low-dose Lantus and expect hyperglycemia with initiation of steroids  Titrate insulin based on blood sugar level    Hypoglycemia protocol  Diabetic diet

## 2022-09-02 NOTE — ASSESSMENT & PLAN NOTE
Wt Readings from Last 3 Encounters:   09/01/22 (!) 139 kg (306 lb)   08/17/22 (!) 142 kg (312 lb 6 4 oz)   08/08/22 (!) 142 kg (312 lb)     · Clinically appears euvolemic  · Weight is lower than last cardiology appointment on 08/17/2022  · Hyponatremia noted  · Continue home dose Bumex     · Check cardiac BMP  · I&O and daily weight  · Cardiology consult  · Salt and fluid-restricted diet

## 2022-09-02 NOTE — ASSESSMENT & PLAN NOTE
Lab Results   Component Value Date    EGFR 33 09/02/2022    EGFR 38 09/01/2022    EGFR 40 08/03/2022    CREATININE 2 17 (H) 09/02/2022    CREATININE 1 91 (H) 09/01/2022    CREATININE 1 84 (H) 08/03/2022     · Baseline creatinine seems between 1 6-1 9  · Currently at baseline  · Continue home dose Bumex    · Trend BMP  · I&O and daily weight

## 2022-09-02 NOTE — UTILIZATION REVIEW
Initial Clinical Review    Admission: Date/Time/Statement:   Admission Orders (From admission, onward)     Ordered        09/01/22 1921  INPATIENT ADMISSION  Once                      Orders Placed This Encounter   Procedures    INPATIENT ADMISSION     Standing Status:   Standing     Number of Occurrences:   1     Order Specific Question:   Level of Care     Answer:   Med Surg [16]     Order Specific Question:   Estimated length of stay     Answer:   More than 2 Midnights     Order Specific Question:   Certification     Answer:   I certify that inpatient services are medically necessary for this patient for a duration of greater than two midnights  See H&P and MD Progress Notes for additional information about the patient's course of treatment  ED Arrival Information     Expected   -    Arrival   9/1/2022 17:04    Acuity   Urgent            Means of arrival   Ambulance    [facility]    Service   Hospitalist    Admission type   Urgent            Arrival complaint   Chest pain            Chief Complaint   Patient presents with    Chest Pain     Pt reports CP and SOB since 0100 yesterday, pt hx CHF and asthma        Initial Presentation: 56 y o  male presented to the ED with chest pain and shortness of breath which started yesterday as per patient  Patient has known history of diastolic heart failure and asthma  He has been seeing Pulmonary and Cardiology outpatient and based on outpatient records, patient has been having intermittent symptoms of chest pain and shortness of breath on exertion  He was recently in the hospital last month for acute on chronic CHF exacerbation and he was discharged on p o  Bumex with his weight of 314 lb which was decreased to 312 lb on his next cardiology appointment 15 days before  Weight this admission is 306 lb  Given chest pain and shortness of breath on exertion, cardiology team recommended outpatient stress test during his outpatient visit    He schedule stress test but was unable to go through the test given inability to lay flat  He reports that his last asthma exacerbation was in May 2022 when he received steroid taper  Since yesterday, he has been having intermittent substernal chest heaviness which shortness of breath  He has also noticed increased clear phlegm production  PMH: Afib, asthma, CHF, CKD stage 3, DM, HLD, HTN  PE: breath sounds decreased bilaterally, wheezing present  Plan: Inpatient admission for evaluation and treatment of chest pain and shortness of breath, leukocytosis, hyponatremia: Cardiology consult, BNP, telemetry, rend tropnin, continue statin and BB, blood cultures, trend CBC, check urine sodium and urea, continue home Bumex, fluid restriction, trend BMP  Date: 9/2   Day 2:     Internal medicine: continue home Bumex 3 mg bid, salt and fluid restricted diet, Nuclear stress test ordered, continue prednisone, PT/OT are recommending cardiac rehab at discharge, continue Eliquis      ED Triage Vitals   Temperature Pulse Respirations Blood Pressure SpO2   09/01/22 1710 09/01/22 1710 09/01/22 1710 09/01/22 1710 09/01/22 1710   98 5 °F (36 9 °C) 88 20 116/75 97 %      Temp Source Heart Rate Source Patient Position - Orthostatic VS BP Location FiO2 (%)   09/01/22 1710 09/01/22 1710 09/01/22 1710 09/01/22 1710 --   Oral Monitor Lying Left arm       Pain Score       09/01/22 2344       4          Wt Readings from Last 1 Encounters:   09/01/22 (!) 139 kg (306 lb)     Additional Vital Signs:     Date/Time Temp Pulse Resp BP MAP (mmHg) SpO2 O2 Device   09/02/22 08:13:59 -- -- -- 121/78 92 -- --   09/02/22 08:12:11 -- 92 -- 131/79 96 95 % --   09/02/22 0751 -- 94 -- -- -- 98 % None (Room air)   09/02/22 07:32:50 97 4 °F (36 3 °C) Abnormal  92 18 131/80 97 94 % --   09/02/22 06:28:28 -- 82 -- 131/83 99 95 % --   09/01/22 2300 -- 90 18 135/61 88 -- --   09/01/22 2200 -- 84 15 116/60 82 96 % None (Room air)   09/01/22 2130 -- 82 15 110/64 81 99 % None (Room air)   09/01/22 2100 -- 82 13 110/60 79 96 % None (Room air)   09/01/22 1830 -- 84 20 105/60 75 97 % None (Room air)       Pertinent Labs/Diagnostic Test Results:   XR chest 2 views   Final Result by Deana Landon MD (09/02 6174)   No acute cardiopulmonary findings                     Workstation performed: [id number]           9/1 EKG:  Normal sinus rhythm  Normal ECG  When compared with ECG of 29-JUL-2022 12:58,  No significant change was found    Results from last 7 days   Lab Units 09/01/22  1721   SARS-COV-2  Negative     Results from last 7 days   Lab Units 09/02/22  0641 09/01/22  1721   WBC Thousand/uL 6 96 12 39*   HEMOGLOBIN g/dL 12 6 12 3   HEMATOCRIT % 37 8 37 3   PLATELETS Thousands/uL 212 239   NEUTROS ABS Thousands/µL 6 48 10 85*         Results from last 7 days   Lab Units 09/02/22  0641 09/01/22  1721   SODIUM mmol/L 130* 128*   POTASSIUM mmol/L 3 9 3 5   CHLORIDE mmol/L 94* 93*   CO2 mmol/L 27 21   ANION GAP mmol/L 9 14*   BUN mg/dL 28* 20   CREATININE mg/dL 2 17* 1 91*   EGFR ml/min/1 73sq m 33 38   CALCIUM mg/dL 8 7 8 2*     Results from last 7 days   Lab Units 09/01/22  1721   AST U/L 44   ALT U/L 33   ALK PHOS U/L 147*   TOTAL PROTEIN g/dL 7 1   ALBUMIN g/dL 3 0*   TOTAL BILIRUBIN mg/dL 1 41*     Results from last 7 days   Lab Units 09/02/22  0644   POC GLUCOSE mg/dl 240*     Results from last 7 days   Lab Units 09/02/22  0641 09/01/22  1721   GLUCOSE RANDOM mg/dL 217* 149*     Results from last 7 days   Lab Units 09/01/22  1942   OSMOLALITY, SERUM mmol/         Results from last 7 days   Lab Units 09/01/22 2118 09/01/22  1942 09/01/22  1721   HS TNI 0HR ng/L  --   --  22   HS TNI 2HR ng/L 20  --   --    HSTNI D2 ng/L -2  --   --    HS TNI 4HR ng/L  --  21  --    HSTNI D4 ng/L  --  -1  --              Results from last 7 days   Lab Units 09/01/22  1721   TSH 3RD GENERATON uIU/mL 1 270     Results from last 7 days   Lab Units 09/02/22  0641 09/01/22  1942   PROCALCITONIN ng/ml 1 48* 1 74*                 Results from last 7 days   Lab Units 09/01/22  1942   NT-PRO BNP pg/mL 2,545*         Results from last 7 days   Lab Units 09/02/22  0023 09/01/22  1942   OSMOLALITY, SERUM mmol/KG  --  296   OSMO UR mmol/  --      Results from last 7 days   Lab Units 09/02/22  0023   SODIUM UR  22     Results from last 7 days   Lab Units 09/01/22  1721   INFLUENZA A PCR  Negative   INFLUENZA B PCR  Negative   RSV PCR  Negative         Results from last 7 days   Lab Units 09/01/22  1942   BLOOD CULTURE  Received in Microbiology Lab  Culture in Progress  Received in Microbiology Lab  Culture in Progress           ED Treatment:   Medication Administration from 09/01/2022 1704 to 09/01/2022 2338       Date/Time Order Dose Route Action     09/01/2022 2122 predniSONE tablet 40 mg 40 mg Oral Given     09/01/2022 2123 apixaban (ELIQUIS) tablet 5 mg 5 mg Oral Given     09/01/2022 2124 levalbuterol (Cletis Clack) inhalation solution 1 25 mg 1 25 mg Nebulization Given     09/01/2022 2122 insulin glargine (LANTUS) subcutaneous injection 7 Units 0 07 mL 7 Units Subcutaneous Given     09/01/2022 2123 bumetanide (BUMEX) tablet 3 mg 3 mg Oral Given     09/01/2022 2123 ceftriaxone (ROCEPHIN) 1 g/50 mL in dextrose IVPB 1,000 mg Intravenous New Bag        Past Medical History:   Diagnosis Date    Acute gastritis without hemorrhage     last assessed 3/24/17    Asthma     [facility] )     Diabetes mellitus ([facility] )     Gastric bypass status for obesity     Hyperlipidemia     Hypertension     Impaired fasting glucose     last assessed 5/10/17    Obesity     Viral gastroenteritis     last assessed 11/4/16     Present on Admission:   Paroxysmal atrial fibrillation (HCC)   Type 2 diabetes mellitus, without long-term current use of insulin (HCC)   Stage 3a chronic kidney disease [facility]   Essential hypertension   Moderate persistent asthma      Admitting Diagnosis: Shortness of breath [Z97 56]  Chronic diastolic heart failure (HCC) [I50 32]  Lightheadedness [R42]  Hyponatremia [E87 1]  Paroxysmal atrial fibrillation (HCC) [I48 0]  Chest pain [R07 9]  Age/Sex: 56 y o  male  Admission Orders:  Scheduled Medications:  amLODIPine, 5 mg, Oral, Daily  apixaban, 5 mg, Oral, BID  atorvastatin, 10 mg, Oral, Daily  budesonide-formoterol, 2 puff, Inhalation, BID  bumetanide, 3 mg, Oral, BID  cefTRIAXone, 1,000 mg, Intravenous, Q24H  insulin glargine, 7 Units, Subcutaneous, HS  insulin lispro, 1-5 Units, Subcutaneous, HS  insulin lispro, 1-6 Units, Subcutaneous, TID AC  levalbuterol, 1 25 mg, Nebulization, TID  metoprolol succinate, 50 mg, Oral, Daily  montelukast, 10 mg, Oral, HS  potassium chloride, 40 mEq, Oral, BID  predniSONE, 40 mg, Oral, Daily  sodium chloride, 3 mL, Nebulization, TID  umeclidinium bromide, 1 puff, Inhalation, Daily      Continuous IV Infusions:     PRN Meds:  albuterol, 2 puff, Inhalation, Q4H PRN  benzonatate, 100 mg, Oral, TID PRN        IP CONSULT TO CARDIOLOGY    Network Utilization Review Department  ATTENTION: Please call with any questions or concerns to [phone] and carefully listen to the prompts so that you are directed to the right person  All voicemails are confidential   Armand Recio all requests for admission clinical reviews, approved or denied determinations and any other requests to dedicated fax number below belonging to the campus where the patient is receiving treatment   List of dedicated fax numbers for the Facilities:  [address] DENIALS (Administrative/Medical Necessity) [phone]   [address] [place] [address] (Maternity/NICU/Pediatrics) 261 [facility] [address] [facility] [place] [address] [place] [address] [place] [id number] [place] [address] Berkley Katz [address]81 [address] [facility] [place] [address] [phone]

## 2022-09-02 NOTE — PHYSICAL THERAPY NOTE
Physical Therapy Evaluation  Patient Name: Dagmar Jeronimo    [id number] Date: 9/2/2022     Problem List  Active Problems:    Essential hypertension    Type 2 diabetes mellitus, without long-term current use of insulin (HCC)    Hyponatremia    Moderate persistent asthma    Chronic diastolic heart failure (HCC)    Stage 3a chronic kidney disease (HCC)    Paroxysmal atrial fibrillation (HCC)    Chest pain and shortness of breath    Chills and leukocytosis       Past Medical History  Past Medical History:   Diagnosis Date    Acute gastritis without hemorrhage     last assessed 3/24/17    Asthma     CH[facility]congestive [facility] failure) ([facility] )     Diabetes mellitus ([facility] )     Gastric bypass status for obesity     Hyperlipidemia     Hypertension     Impaired fasting glucose     last assessed 5/10/17    Obesity     Viral gastroenteritis     last assessed 11/4/16        Past Surgical History  Past Surgical History:   Procedure Laterality Date    CARDIAC CATHETERIZATION N/A 3/9/2022    Procedure: CARDIAC RHC W/ PRESSURE SENSOR;  Surgeon: Peggy Pettit MD;  Location:  CARDIAC CATH LAB; Service: Cardiology    CARPAL TUNNEL RELEASE      bilateral    GASTRIC BYPASS  2004    with han en y    HERNIA REPAIR      ventral inscisional    TONSILLECTOMY             09/02/22 0817   PT Last Visit   PT Visit Date 09/02/22   Note Type   Note type Evaluation   Pain Assessment   Pain Assessment Tool 0-10   Pain Score 4   Pain Location/Orientation Location: Chest   Patient's Stated Pain Goal No pain   Restrictions/Precautions   Weight Bearing Precautions Per Order No   Other Precautions Telemetry; Fall Risk;Pain   Home Living   Type of [address] [place] [address] Multi-level;Bed/bath upstairs;Stairs to enter with rails  (3 SH, 6 MADYSON, full flight to 2nd floor full bath, 2 flights to 3rd floor bedroom )   Bathroom Shower/Tub Tub/shower unit   [employer] Raised   Additional Comments pt reports no AD or DME use PTA  He reports sleeping on first floor on sofa 2* difficulty sleeping flat  pt reports he has been having SOB with stair negotiation at home and has been unable to do things with his family 2* SOB  Prior Function   Level of [place] Independent with ADLs and functional mobility   Lives With Spouse;Daughter   Receives Help From Family   ADL Assistance Independent   IADLs Independent   Comments family able to assist as needed   General   Family/Caregiver Present No   Cognition   Overall Cognitive Status WFL   Arousal/Participation Cooperative   Attention Within functional limits   Orientation Level Oriented X4   Memory Within functional limits   Following Commands Follows all commands and directions without difficulty   Comments very pleasant to work with   RLE Assessment   RLE Assessment WFL   LLE Assessment   LLE Assessment WFL   Bed Mobility   Supine to Sit 6  Modified independent   Additional items HOB elevated   Sit to Supine Unable to assess   Additional Comments remained seated in chair upon conclusion   Transfers   Sit to Stand 6  Modified independent   Stand to Sit 6  Modified independent   Stand pivot 6  Modified independent   Additional Comments no AD   Ambulation/Elevation   Gait pattern Decreased foot clearance; Short stride   Gait Assistance 6  Modified independent   Assistive Device None   Distance 30'+stairs+30'   Stair Management Assistance 6  Modified independent   Stair Management Technique One rail R;Alternating pattern; Foreward;Reciprocal   Number of Stairs 7   Ambulation/Elevation Additional Comments stable SPO2 on RA throughout  no SOB noted  Pt reports difficulty with stairs and inclines PTA     Balance   Static Sitting Normal   Dynamic Sitting Good   Static Standing Fair +   Dynamic Standing Fair   Ambulatory Fair   Endurance Deficit   Endurance Deficit Yes   Endurance Deficit Description TELLES/SOB with increased distances per pt   Activity Tolerance   Activity Tolerance Patient tolerated treatment well   Medical Staff Made Aware OT, Glinda Oppenheim   Nurse Made Aware pt cleared to see and mobilize per nursing   Assessment   Prognosis Good   Problem List Decreased endurance;Obesity   Assessment Pt is a 56 y o  male [facility] with chest pain and shortness of breath which worsens with exertion  Pt has the following comorbidities which affect their treatment: h/o asthma, CHF, diabetes mellitus, HTN, obesity as well as personal factors including stairs to access home  Pt reports he has been having difficulty doing things with his family 2* his TELLES  He states he has increased difficulty with elevations and stairs and prefers to sleep on couch 2* difficulty laying flat  Awaiting cardiac stress test  Pt has a moderate complexity clinical presentation due to Ongoing medical management for primary dx, Decreased activity tolerance compared to baseline, Fall risk, Increased assistance needed from caregiver at current time, Ongoing telemetry monitoring, Trending lab values, Diagnostic imaging pending, Continuous pulse oximetry monitoring  , and PMH  PT was consulted to evaluate pt's functional mobility and discharge needs  Upon evaluation, patient required mod I for all mobility as outlined above  Pt's functional impairments include: impaired endurance  At conclusion of eval, pt remained seated in chair with phone, call bell, and all other personal needs within reach  The patient's AM-PAC Basic Mobility Inpatient Short Form Raw Score is 24  A Raw score of greater than 16 suggests the patient may benefit from discharge to home  Please also refer to the recommendation of the Physical Therapist for safe discharge planning  At this time, pt has no further acute care PT needs 2* being mod I for all mobility, being able to safely access home, and having a supportive family who can assist as needed   Pt denies any concerns regarding their functional mobility or ability to return home safely at this time  Recommend pt continues to mobilize with nursing and restorative staff during hospital stay  Please consult with any changes in mobility status  D/C recommendations are home with cardiac OP rehab for cardiovascular and endurance training     Barriers to Discharge None   Goals   Patient Goals to go home   PT Treatment Day 0   Plan   PT Frequency Other (Comment)  (d/c acute care PT)   Recommendation   PT Discharge Recommendation (S)  Home with outpatient rehabilitation  (cardiac OP rehab for endurance training)   AM-PAC Basic Mobility Inpatient   Turning in Bed Without Bedrails 4   Lying on Back to Sitting on Edge of Flat Bed 4   Moving Bed to Chair 4   Standing Up From Chair 4   Walk in Room 4   Climb 3-5 Stairs 4   Basic Mobility Inpatient Raw Score 24   Basic Mobility Standardized Score 57 68   Highest Level Of Mobility   JH-HLM Goal 8: Walk 250 feet or more   JH-HLM Achieved 7: Walk 25 feet or more   Modified [place] Scale   Modified Vienna Scale 2   Ekta Boland, PT, DPT

## 2022-09-02 NOTE — ASSESSMENT & PLAN NOTE
· Unclear etiology  · Euvolemic hyponatremia, better  · Continue home dose Bumex    · Fluid restriction  · Trend BMP

## 2022-09-02 NOTE — ASSESSMENT & PLAN NOTE
· Patient reports of having chills and increased clear phlegm production    · Chest x-ray does not seem to have focal consolidation  · Mild leukocytosis noted - now better  · Elevated procal (but has CKD)  · On rocephin -> stop that

## 2022-09-02 NOTE — ASSESSMENT & PLAN NOTE
· Worsening shortness of breath with increase clear phlegm production  · Last exacerbation requiring tapering p o  Steroids in May 2022  · Follows up with Pulmonary outpatient  · Will start on prednisone 40 mg daily  If improvement in symptoms noted, consider prolonged taper over next 10- 14 days  · Incentive spirometry respiratory protocol  ·  Continue home dose Singulair, Xopenex, Symbicort  · Incruse Ellipta daily  · If procalcitonin elevated or symptoms persistent, consider adding antibiotics  Patient reports "jitters" to azithromycin and QTC is borderline, consider adding cephalosporin if antibiotics needed  Comfort Plunkett

## 2022-09-02 NOTE — H&P
[address] 1965, 56 y o  male MRN: [id number]  Unit/Bed#: ED 23 Encounter: [id number]  Primary Care Provider: Ambar Whittaker MD   Date and time admitted to hospital: 9/1/2022  5:08 PM    WILL INITIATE IV CEFTRIAXONE GIVEN ELEVATED PROCALCITONIN  CHECK MRSA SWAB  COVID 19 NEGATIVE  Chest pain and shortness of breath  Assessment & Plan  · Associated with shortness of breath  Mild wheezing on exam   · S per patient, started yesterday but based on review of records, this has been ongoing intermittently for quite some time  · Unclear etiology  Less likely MI, rule out ACS  · Does not appear in volume overload state  · Possible asthma exacerbation  Possible URTI  Consider pneumonia although chest x-ray does not seem to have focal consolidation  · ISATU score 1  · 1st troponin normal  · EKG nonischemic  · Last echo showed EF of 55% with grade 1 diastolic dysfunction  PLAN:  · Patient was recommended to have nuclear medicine stress test outpatient on last cardiology visit last 1, he could not tolerate diet as he could not lay flat  Will request stress test again on this admission if he is able to lay flat  Might need to consider iv diuresis if unable to lay flat although roger snot appear overloaded currently  · Cardiology consult, defer to Cardiology for further cardiac workup  · Continue home dose Bumex  · Check cardiac BNP  · Monitor on telemetry  · Trend troponin  · Continue statin and beta-blocker    Chills and leukocytosis  Assessment & Plan  · Patient reports of having chills and increased clear phlegm production  · Chest x-ray does not seem to have focal consolidation, official read pending  · Mild leukocytosis noted  PLAN:  · Check blood cultures  · Check procalcitonin, if elevated, consider starting antibiotics (ceftriaxone) for pneumonia coverage  · Follow chest x-ray read    · Trend CBC with differential    Moderate persistent asthma  Assessment & Plan  · Worsening shortness of breath with increase clear phlegm production  · Last exacerbation requiring tapering p o  Steroids in May 2022  · Follows up with Pulmonary outpatient  · Will start on prednisone 40 mg daily  If improvement in symptoms noted, consider prolonged taper over next 10- 14 days  · Incentive spirometry respiratory protocol  ·  Continue home dose Singulair, Xopenex, Symbicort  · Incruse Ellipta daily  · If procalcitonin elevated or symptoms persistent, consider adding antibiotics  Patient reports "jitters" to azithromycin and QTC is borderline, consider adding cephalosporin if antibiotics needed  Cheryl Galvan Hyponatremia  Assessment & Plan  · Unclear etiology  · Euvolemic on exam   · Check urine and serum osmolality  · Check urine sodium and urea  Urine sodium will not be reliable  in setting of diuretic use  · Check TSH and uric acid  · Check random cortisol prior to steroid dose  · Continue home dose Bumex  · Fluid restriction  · Trend BMP    Paroxysmal atrial fibrillation (HCC)  Assessment & Plan  Continue with home dose metoprolol  Continue home dose Eliquis for anticoagulation    Stage 3a chronic kidney disease [facility])  Assessment & Plan  Lab Results   Component Value Date    EGFR 38 09/01/2022    EGFR 40 08/03/2022    EGFR 38 08/02/2022    CREATININE 1 91 (H) 09/01/2022    CREATININE 1 84 (H) 08/03/2022    CREATININE 1 92 (H) 08/02/2022     · Baseline creatinine seems between 1 6-1 9  · Currently at baseline  · Continue home dose Bumex  · Trend BMP  · I&O and daily weight    Chronic diastolic heart failure [facility])  Assessment & Plan  Wt Readings from Last 3 Encounters:   09/01/22 (!) 139 kg (306 lb)   08/17/22 (!) 142 kg (312 lb 6 4 oz)   08/08/22 (!) 142 kg (312 lb)     · Clinically appears euvolemic  · Weight is lower than last cardiology appointment on 08/17/2022  · Hyponatremia noted  · Continue home dose Bumex     · Check cardiac BMP  · I&O and daily weight  · Cardiology consult  · Salt and fluid-restricted diet        Type 2 diabetes mellitus, without long-term current use of insulin ([facility] [address] )  Assessment & Plan  Lab Results   Component Value Date    HGBA1C 5 7 06/16/2022       No results for input(s): POCGLU in the last 72 hours  Blood Sugar Average: Last 72 hrs:     Hold home dose sitagliptin while in hospital   Insulin sliding scale Accu-Cheks  Add low-dose Lantus and expect hyperglycemia with initiation of steroids  Titrate insulin based on blood sugar level  Hypoglycemia protocol  Diabetic diet    Essential hypertension  Assessment & Plan  Continue home dose metoprolol, Bumex and amlodipine with holding parameters  VTE Prophylaxis: Apixaban (Eliquis)  / sequential compression device   Code Status: full    Anticipated Length of Stay:  Patient will be admitted on an Inpatient basis with an anticipated length of stay of  more than 2 midnights  Justification for Hospital Stay:  Ongoing evaluation    Total Time for Visit, including Counseling / Coordination of Care: 60 minutes  Greater than 50% of this total time spent on direct patient counseling and coordination of care  Chief Complaint:   Chest pain and shortness of breath    History of Present Illness:    Manish Shafer is a 56 y o  male who presents with chest pain and shortness of breath which started yesterday as per patient  Patient has known history of diastolic heart failure and asthma  He has been seeing Pulmonary and Cardiology outpatient and based on outpatient records, patient has been having intermittent symptoms of chest pain and shortness of breath on exertion  He was recently in the hospital last month for acute on chronic CHF exacerbation and he was discharged on p o  Bumex with his weight of 314 lb which was decreased to 312 lb on his next cardiology appointment 15 days before  Weight this admission is 306 lb    Given chest pain and shortness of breath on exertion, cardiology team recommended outpatient stress test during his outpatient visit  He schedule stress test but was unable to go through the test given inability to lay flat  He reports that his last asthma exacerbation was in May 2022 when he received steroid taper  Since yesterday, he has been having intermittent substernal chest heaviness which shortness of breath  He has also noticed increased clear phlegm production  He had chills but did not take his temperature  Denies any diarrhea or constipation  Has received his COVID vaccination  No sick contacts  No worsening lower extremity swelling  Review of Systems:    Review of Systems   Constitutional: Positive for chills  Negative for fever  HENT: Negative  Respiratory: Positive for cough and shortness of breath  Cardiovascular: Positive for chest pain  Negative for leg swelling  Gastrointestinal: Negative  Genitourinary: Negative  Musculoskeletal: Negative  Neurological: Negative  Past Medical and Surgical History:     Past Medical History:   Diagnosis Date    Acute gastritis without hemorrhage     last assessed 3/24/17    Asthma     CHF (congestive heart failure) ([facility] [address] )     [facility] ([email or web address] )     Gastric bypass status for obesity     Hyperlipidemia     Hypertension     Impaired fasting glucose     last assessed 5/10/17    Obesity     Viral gastroenteritis     last assessed 11/4/16       Past Surgical History:   Procedure Laterality Date    CARDIAC CATHETERIZATION N/A 3/9/2022    Procedure: CARDIAC RHC W/ PRESSURE SENSOR;  Surgeon: Alan Bunn MD;  Location: [facility]; Service: Cardiology    CARPAL TUNNEL RELEASE      bilateral    GASTRIC BYPASS  2004    with han en y   [address]      ventral inscisional    TONSILLECTOMY         Meds/Allergies:    Prior to Admission medications    Medication Sig Start Date End Date Taking?  Authorizing Provider   Accu-Chek FastClix Lancets MISC Test blood sugar twice daily 11/16/21   Antonella Skelton MD   albuterol (PROVENTIL HFA,VENTOLIN HFA) 90 mcg/act inhaler INHALE 2 PUFFS EVERY 4 (FOUR) HOURS AS NEEDED FOR WHEEZING OR SHORTNESS OF BREATH 7/6/22   Antonella Skelton MD   amLODIPine (NORVASC) 5 mg tablet Take 1 tablet (5 mg total) by mouth daily 8/4/22 9/3/22  Palak Briggs MD   apixaban (Eliquis) 5 mg Take 1 tablet (5 mg total) by mouth 2 (two) times a day 3/23/22   Deborah Teixeira PA-C   atorvastatin (LIPITOR) 10 mg tablet Take 1 tablet (10 mg total) by mouth in the morning   5/18/22   Antonella Skelton MD   benzonatate (TESSALON PERLES) 100 mg capsule Take 1 capsule (100 mg total) by mouth 3 (three) times a day as needed (for cough) 8/8/22   Antonella Skelton MD   Blood Glucose Monitoring Suppl (Accu-Chek Guide) w/Device KIT Use 2 (two) times a day Test blood sugar twice daily 8/5/21   Antonella Skelton MD   bumetanide (BUMEX) 1 mg tablet Take 3 tablets (3 mg total) by mouth 2 (two) times a day 8/3/22 9/2/22  Palak Briggs MD   levalbuterol (Xopenex) 1 25 mg/3 mL nebulizer solution Take 3 mL (1 25 mg total) by nebulization 3 (three) times a day  Patient taking differently: Take 1 25 mg by nebulization as needed 11/1/21   RODRIGUE Rosario   metoprolol succinate (TOPROL-XL) 50 mg 24 hr tablet Take 1 tablet (50 mg total) by mouth daily 3/14/22   Deborah Teixeira PA-C   montelukast (SINGULAIR) 10 mg tablet Take 1 tablet (10 mg total) by mouth daily at bedtime 2/3/22 8/17/22  Antonella Skelton MD   potassium chloride (K-DUR,KLOR-CON) 20 mEq tablet Take 2 tablets (40 mEq total) by mouth 2 (two) times a day 8/12/22   Deborah Teixeira PA-C   sitaGLIPtin (Januvia) 100 mg tablet Take 1/2 tab  daily 8/8/22   Antonella Skelton MD   Symbicort 160-4 5 MCG/ACT inhaler 2 PUFFS BY MOUTH TWICE DAILY , RINSE MOUTH THEN SPIT AFTER USE 7/14/22   Antonella Skelton MD   tiotropium (Spiriva Respimat) 1 25 MCG/ACT AERS inhaler Inhale 2 puffs daily 2/3/22   Santos Hamilton MD         Allergies: Allergies   Allergen Reactions    Azithromycin Other (See Comments)     Shaky, uneasy feeling     Bactrim [Sulfamethoxazole-Trimethoprim] Other (See Comments)     shakiness       Social History:     Marital Status: /Civil Union     Substance Use History:   Social History     Substance and Sexual Activity   Alcohol Use Not Currently    Alcohol/week: 2 0 standard drinks    Types: 2 Shots of liquor per week    Comment: social     Social History     Tobacco Use   Smoking Status Former Smoker    Packs/day: 0 01    Years: 11 00    Pack years: 0 11    Types: Pipe, Cigars    Start date:     Quit date:     Years since quittin 6   Smokeless Tobacco Never Used   Tobacco Comment    cigar once a day     Social History     Substance and Sexual Activity   Drug Use No       Family History:    non-contributory    Physical Exam:     Vitals:   Blood Pressure: 105/60 (22)  Pulse: 84 (22)  Temperature: 98 5 °F (36 9 °C) (22)  Temp Source: Oral (22)  Respirations: 20 (22)  Weight - Scale: (!) 139 kg (306 lb) (22)  SpO2: 97 % (22)    Physical Exam  Constitutional:       General: He is not in acute distress  Cardiovascular:      Rate and Rhythm: Normal rate and regular rhythm  Heart sounds: Normal heart sounds  No murmur heard  Pulmonary:      Effort: No respiratory distress  Breath sounds: Wheezing present  No rales  Comments: Breath sounds bilaterally decreased  Abdominal:      General: Bowel sounds are normal  There is no distension  Tenderness: There is no abdominal tenderness  Musculoskeletal:         General: No swelling  Skin:     General: Skin is warm  Neurological:      Mental Status: He is alert  Comments: Awake alert and communicative             Additional Data:     Lab Results: I have personally reviewed pertinent reports        Results from last 7 days   Lab Units 09/01/22  1721   WBC Thousand/uL 12 39*   HEMOGLOBIN g/dL 12 3   HEMATOCRIT % 37 3   PLATELETS Thousands/uL 239   NEUTROS PCT % 88*   LYMPHS PCT % 4*   MONOS PCT % 7   EOS PCT % 0     Results from last 7 days   Lab Units 09/01/22  1721   SODIUM mmol/L 128*   POTASSIUM mmol/L 3 5   CHLORIDE mmol/L 93*   CO2 mmol/L 21   BUN mg/dL 20   CREATININE mg/dL 1 91*   ANION GAP mmol/L 14*   CALCIUM mg/dL 8 2*   ALBUMIN g/dL 3 0*   TOTAL BILIRUBIN mg/dL 1 41*   ALK PHOS U/L 147*   ALT U/L 33   AST U/L 44   GLUCOSE RANDOM mg/dL 149*                       Imaging: I have personally reviewed pertinent reports  XR chest 2 views    (Results Pending)         [facility] Records Reviewed: Yes     ** Please Note: This note has been constructed using a voice recognition system   **

## 2022-09-02 NOTE — ASSESSMENT & PLAN NOTE
Lab Results   Component Value Date    HGBA1C 5 7 06/16/2022       Recent Labs     09/02/22  0644 09/02/22  1047   POCGLU 240* 238*       Blood Sugar Average: Last 72 hrs:  (P) 239   Hold home dose sitagliptin while in hospital   Insulin sliding scale Accu-Cheks  Add low-dose Lantus and expect hyperglycemia with initiation of steroids  Titrate insulin based on blood sugar level    Hypoglycemia protocol  Diabetic diet

## 2022-09-02 NOTE — ASSESSMENT & PLAN NOTE
· Worsening shortness of breath with increase clear phlegm production  · Last exacerbation requiring tapering PO Steroids in May 2022  · Follows up with Pulmonary outpatient  · Started on PO prednisone 40 mg daily  If improvement in symptoms noted, consider prolonged taper over next 10-14 days  · Incentive spirometry respiratory protocol    · Continue home dose Singulair, Xopenex, Symbicort  · Incruse Ellipta daily  · Procalcitonin elevated, hence started on IV rocephin

## 2022-09-02 NOTE — ASSESSMENT & PLAN NOTE
Lab Results   Component Value Date    EGFR 38 09/01/2022    EGFR 40 08/03/2022    EGFR 38 08/02/2022    CREATININE 1 91 (H) 09/01/2022    CREATININE 1 84 (H) 08/03/2022    CREATININE 1 92 (H) 08/02/2022     · Baseline creatinine seems between 1 6-1 9  · Currently at baseline  · Continue home dose Bumex    · Trend BMP  · I&O and daily weight

## 2022-09-02 NOTE — UTILIZATION REVIEW
Inpatient Admission Authorization Request   NOTIFICATION OF INPATIENT ADMISSION/INPATIENT AUTHORIZATION REQUEST   SERVICING FACILITY:   [facility]  Address: [address] [facility], [address] [place] [address]  Tax ID: [id number]  NPI: [id number]  Place of Service: Inpatient [address] [facility] Code: 24     ATTENDING PROVIDER:  Attending Name and NPI#: Ole Hightower [[id number]]  Address: [address] [place] [address], [address] [place] [address]  Phone: [phone]     UTILIZATION REVIEW CONTACT:  Susan Munoz Utilization   Network Utilization Review Department  Phone: [phone]  Fax: [phone]  Email: [email or web address]     PHYSICIAN ADVISORY SERVICES:  FOR [id number] REVIEW - MEDICAL NECESSITY DENIAL  Phone: [phone]  Fax: [phone]  Email: [email or web address]     TYPE OF REQUEST:  Inpatient Status     ADMISSION INFORMATION:  ADMISSION DATE/TIME: 9/1/22  7:21 PM  PATIENT DIAGNOSIS CODE/DESCRIPTION:  Shortness of breath [Z05 59]  Chronic diastolic heart failure (HCC) [I50 32]  Lightheadedness [R42]  Hyponatremia [E87 1]  Paroxysmal atrial fibrillation (HCC) [I48 0]  Chest pain [R07 9]  DISCHARGE DATE/TIME: No discharge date for patient encounter  IMPORTANT INFORMATION:  Please contact Susan Munoz directly with any questions or concerns regarding this request  Department voicemails are confidential     Send requests for admission clinical reviews, concurrent reviews, approvals, and administrative denials due to lack of clinical to fax [phone]

## 2022-09-02 NOTE — PROGRESS NOTES
[address] [facility]  Progress Note - David Petersen 1965, 56 y o  male MRN: [id number]  Unit/Bed#: PPHP 710-01 Encounter: [id number]  Primary Care Provider: Selina Elliott MD   Date and time admitted to hospital: 9/1/2022  5:08 PM    Chronic diastolic heart failure [facility])  Assessment & Plan  Wt Readings from Last 3 Encounters:   09/01/22 (!) 139 kg (306 lb)   08/17/22 (!) 142 kg (312 lb 6 4 oz)   08/08/22 (!) 142 kg (312 lb)     · Clinically appears euvolemic  · Weight is lower than last cardiology appointment on 08/17/2022  · Hyponatremia noted  · No signs of overload  · Continue home dose Bumex 3 mg BID  · Check cardiac BMP  · I&O and daily weight  · Cardiology consult  · Salt and fluid-restricted diet        * Chest pain and shortness of breath  Assessment & Plan  · Associated with shortness of breath  Mild wheezing on exam   · this has been ongoing intermittently for quite some time  · Unclear etiology  · rule out ACS -> Nuclear stress test today  · Does not appear in volume overload state  · Possible poorly controlled asthma  · ISATU score 1  · troponin normal  · EKG nonischemic  · Last echo showed EF of 55% with grade 1 diastolic dysfunction  · Cont prednisone for asthma at present  · PT OT rec cardiac rehab OP    Paroxysmal atrial fibrillation [facility])  Assessment & Plan  Continue with home dose metoprolol  Continue home dose Eliquis for anticoagulation    Stage 3a chronic kidney disease [facility])  Assessment & Plan  Lab Results   Component Value Date    EGFR 33 09/02/2022    EGFR 38 09/01/2022    EGFR 40 08/03/2022    CREATININE 2 17 (H) 09/02/2022    CREATININE 1 91 (H) 09/01/2022    CREATININE 1 84 (H) 08/03/2022     · Baseline creatinine seems between 1 6-1 9  · Currently at baseline  · Continue home dose Bumex    · Trend BMP  · I&O and daily weight    Moderate persistent asthma  Assessment & Plan  · Worsening shortness of breath with increase clear phlegm production  · Last exacerbation requiring tapering PO Steroids in May 2022  · Follows up with Pulmonary outpatient  · Started on PO prednisone 40 mg daily  If improvement in symptoms noted, consider prolonged taper over next 10-14 days  · Incentive spirometry respiratory protocol  · Continue home dose Singulair, Xopenex, Symbicort  · Incruse Ellipta daily  · Procalcitonin elevated, hence started on IV rocephin    Hyponatremia  Assessment & Plan  · Unclear etiology  · Euvolemic hyponatremia, better  · Continue home dose Bumex  · Fluid restriction  · Trend BMP    Type 2 diabetes mellitus, without long-term current use of insulin [facility])  Assessment & Plan  Lab Results   Component Value Date    HGBA1C 5 7 06/16/2022       Recent Labs     09/02/22  0644 09/02/22  1047   POCGLU 240* 238*       Blood Sugar Average: Last 72 hrs:  (P) 239   Hold home dose sitagliptin while in hospital   Insulin sliding scale Accu-Cheks  Add low-dose Lantus and expect hyperglycemia with initiation of steroids  Titrate insulin based on blood sugar level  Hypoglycemia protocol  Diabetic diet    Essential hypertension  Assessment & Plan  Continue home dose metoprolol, Bumex and amlodipine with holding parameters  VTE Pharmacologic Prophylaxis:   Moderate Risk (Score 3-4) - Pharmacological DVT Prophylaxis Ordered: apixaban (Eliquis)  Patient Centered Rounds: I performed bedside rounds with nursing staff today  Discussions with Specialists or Other Care Team Provider:     Education and Discussions with Family / Patient: patient  Time Spent for Care: 30 minutes  More than 50% of total time spent on counseling and coordination of care as described above  Current Length of Stay: 1 day(s)  Current Patient Status: Inpatient   Certification Statement: The patient will continue to require additional inpatient hospital stay due to stress test , monitor symptoms  Discharge Plan: Anticipate discharge tomorrow to home      Code Status: Level 1 - Full Code    Subjective:   Feels ok, still has the TELLES but did walk with therapy, slowly but without breaks    Objective:     Vitals:   Temp (24hrs), Av °F (36 7 °C), Min:97 4 °F (36 3 °C), Max:98 5 °F (36 9 °C)    Temp:  [97 4 °F (36 3 °C)-98 5 °F (36 9 °C)] 97 4 °F (36 3 °C)  HR:  [82-94] 83  Resp:  [13-20] 18  BP: (105-135)/(60-83) 121/78  SpO2:  [94 %-99 %] 94 %  Body mass index is 41 5 kg/m²  Input and Output Summary (last 24 hours): Intake/Output Summary (Last 24 hours) at 2022 1536  Last data filed at 2022 0001  Gross per 24 hour   Intake --   Output 200 ml   Net -200 ml       Physical Exam:   Physical Exam  Vitals reviewed  HENT:      Head: Normocephalic and atraumatic  Mouth/Throat:      Mouth: Mucous membranes are moist    Eyes:      Conjunctiva/sclera: Conjunctivae normal    Cardiovascular:      Rate and Rhythm: Normal rate and regular rhythm  Heart sounds: Normal heart sounds  Pulmonary:      Effort: Pulmonary effort is normal  No respiratory distress  Breath sounds: Normal breath sounds  No wheezing  Abdominal:      General: There is no distension  Palpations: Abdomen is soft  Tenderness: There is no abdominal tenderness  Musculoskeletal:      Right lower leg: No edema  Left lower leg: No edema  Neurological:      Mental Status: He is alert and oriented to person, place, and time            Additional Data:     Labs:  Results from last 7 days   Lab Units 22  0641   WBC Thousand/uL 6 96   HEMOGLOBIN g/dL 12 6   HEMATOCRIT % 37 8   PLATELETS Thousands/uL 212   NEUTROS PCT % 94*   LYMPHS PCT % 3*   MONOS PCT % 2*   EOS PCT % 0     Results from last 7 days   Lab Units 22  0641 22  1721   SODIUM mmol/L 130* 128*   POTASSIUM mmol/L 3 9 3 5   CHLORIDE mmol/L 94* 93*   CO2 mmol/L 27 21   BUN mg/dL 28* 20   CREATININE mg/dL 2 17* 1 91*   ANION GAP mmol/L 9 14*   CALCIUM mg/dL 8 7 8 2*   ALBUMIN g/dL  --  3 0*   TOTAL BILIRUBIN mg/dL  --  1 41*   ALK PHOS U/L  --  147*   ALT U/L  --  33   AST U/L  --  44   GLUCOSE RANDOM mg/dL 217* 149*         Results from last 7 days   Lab Units 09/02/22  1047 09/02/22  0644   POC GLUCOSE mg/dl 238* 240*         Results from last 7 days   Lab Units 09/02/22  0641 09/01/22  1942   PROCALCITONIN ng/ml 1 48* 1 74*       Lines/Drains:  Invasive Devices  Report    Peripheral Intravenous Line  Duration           Peripheral IV 09/01/22 Left Antecubital <1 day    Peripheral IV 09/02/22 Right;Ventral (anterior) Forearm <1 day                  Telemetry:  Telemetry Orders (From admission, onward)             48 Hour Telemetry Monitoring  Continuous x 48 hours        References:    Telemetry Guidelines   Question:  Reason for 48 Hour Telemetry  Answer:  Acute MI, chest pain - R/O MI, or unstable angina                 Telemetry Reviewed: Normal Sinus Rhythm  Indication for Continued Telemetry Use: Acute MI/Unstable Angina/Rule out ACS             Imaging: Reviewed radiology reports from this admission including: chest xray    Recent Cultures (last 7 days):   Results from last 7 days   Lab Units 09/01/22 1942   BLOOD CULTURE  Received in Microbiology Lab  Culture in Progress  Received in Microbiology Lab  Culture in Progress         Last 24 Hours Medication List:   Current Facility-Administered Medications   Medication Dose Route Frequency Provider Last Rate    albuterol  2 puff Inhalation Q4H PRN Carissa Ca MD      amLODIPine  5 mg Oral Daily Carissa Ca MD      apixaban  5 mg Oral BID Carissa Ca MD      atorvastatin  10 mg Oral Daily Carissa Ca MD      benzonatate  100 mg Oral TID PRN Carissa Ca MD      budesonide-formoterol  2 puff Inhalation BID Carissa Ca MD      bumetanide  3 mg Oral BID Carissa Ca MD      cefTRIAXone  1,000 mg Intravenous Q24H Carissa Ca MD 1,000 mg (09/01/22 2123)    insulin glargine  7 Units Subcutaneous HS Carissa Ca MD      insulin lispro  1-5 Units Subcutaneous HS Alena Hackett MD      insulin lispro  1-6 Units Subcutaneous TID [facility] Alena Hackett MD      levalbuterol  1 25 mg Nebulization TID Alena Hackett MD      metoprolol succinate  50 mg Oral Daily Alena Hackett MD      montelukast  10 mg Oral HS Alena Hackett MD      potassium chloride  40 mEq Oral BID Alena Hackett MD      predniSONE  40 mg Oral Daily Alena Hackett MD      sodium chloride  3 mL Nebulization TID Alena Hackett MD      umeclidinium bromide  1 puff Inhalation Daily Alena Hackett MD          Today, Patient Was Seen By: King Shashank MD    **Please Note: This note may have been constructed using a voice recognition system  **

## 2022-09-02 NOTE — ASSESSMENT & PLAN NOTE
· Associated with shortness of breath  Mild wheezing on exam   · S per patient, started yesterday but based on review of records, this has been ongoing intermittently for quite some time  · Unclear etiology  Less likely MI, rule out ACS  · Does not appear in volume overload state  · Possible asthma exacerbation  Possible URTI  Consider pneumonia although chest x-ray does not seem to have focal consolidation  · ISATU score 1  · 1st troponin normal  · EKG nonischemic  · Last echo showed EF of 55% with grade 1 diastolic dysfunction  PLAN:  · Patient was recommended to have nuclear medicine stress test outpatient on last cardiology visit last 1, he could not tolerate diet as he could not lay flat  Will request stress test again on this admission if he is able to lay flat  Might need to consider iv diuresis if unable to lay flat although roger snot appear overloaded currently  · Cardiology consult, defer to Cardiology for further cardiac workup  · Continue home dose Bumex    · Check cardiac BNP  · Monitor on telemetry  · Trend troponin  · Continue statin and beta-blocker

## 2022-09-02 NOTE — OCCUPATIONAL THERAPY NOTE
Occupational Therapy Evaluation     Patient Name: Darrell MATHUR Date: 9/2/2022  Problem List  Active Problems:    Essential hypertension    Type 2 diabetes mellitus, without long-term current use of insulin (HCC)    Hyponatremia    Moderate persistent asthma    Chronic diastolic heart failure (HCC)    Stage 3a chronic kidney disease (HCC)    Paroxysmal atrial fibrillation (HCC)    Chest pain and shortness of breath    Chills and leukocytosis    Past Medical History  Past Medical History:   Diagnosis Date    Acute gastritis without hemorrhage     last assessed 3/24/17    Asthma     CHF (congestive heart failure) ([facility] [address] )     Diabetes mellitus ([facility] [address] )     Gastric bypass status for obesity     Hyperlipidemia     Hypertension     Impaired fasting glucose     last assessed 5/10/17    Obesity     Viral gastroenteritis     last assessed 11/4/16     Past Surgical History  Past Surgical History:   Procedure Laterality Date    CARDIAC CATHETERIZATION N/A 3/9/2022    Procedure: CARDIAC RHC W/ PRESSURE SENSOR;  Surgeon: Debbi Dawson MD;  Location:  CARDIAC CATH LAB; Service: Cardiology    CARPAL TUNNEL RELEASE      bilateral    GASTRIC BYPASS  2004    with han en y    HERNIA REPAIR      ventral inscisional    TONSILLECTOMY           09/02/22 0805   OT Last Visit   OT Visit Date 09/02/22   Note Type   Note type Evaluation   Additional Comments Pt seen with PT to increase safety, decrease fall risk, and maximize functional/occupational 2* medical complexity which is a regression from pt's functional baseline   Restrictions/Precautions   Weight Bearing Precautions Per Order No   Other Precautions Telemetry; Fall Risk;Pain   Pain Assessment   Pain Assessment Tool 0-10   Pain Score 4   Pain Location/Orientation Location: Chest   Effect of Pain on Daily Activities Impacts ability to engage in valued occupations   Hospital Pain Intervention(s) Repositioned; Ambulation/increased activity; Emotional support   Home Living Type of Home House; Other (Comment)  (3  with [address])   Home Layout Multi-level;Bed/bath upstairs; Performs ADLs on one level; Able to live on main level with bedroom/bathroom; Access   Bathroom Shower/Tub Tub/shower unit   [employer] Raised   Bathroom Equipment Other (Comment)  (No DME)   [address] Other (Comment)  (No DME)   Additional Comments Pt reports living in 3  with 6 [address] in which main bathroom is on the 2nd floor and main bedroom is on the 3rd floor however pt reports staying on first floor on couch; no DME PTA   Prior Function   Level of [place] Independent with ADLs and functional mobility   Lives With Spouse;Daughter   Receives Help From Family   ADL Assistance Independent   IADLs Independent   Falls in the last 6 months 0   Vocational On disability   Comments (+) driving   Lifestyle   Autonomy PTA, pt was independent in ADLs/IADLs and functional mobility w/ no DME; (+) driving   Reciprocal Relationships Lives with wife and daughter who can assist with functional needs prn   Service to Others Reports trying to get on disability   Intrinsic Gratification Enjoys spending time with family   Psychosocial   Psychosocial (WDL) WDL   Subjective   Subjective "I just want to get back to doing my normal activities "   ADL   Where Assessed Edge of bed   Eating Assistance 7  Independent   Grooming Assistance 7  Independent   UB Bathing Assistance 7  Independent     [address] 7  Independent    [address] 6  Modified independent   Bed Mobility   Supine to Sit 6  Modified independent   Sit to Supine Unable to assess   Additional Comments At end of session, pt left sitting upright in recliner with all functional needs in reach   Transfers   Sit to Stand 6  Modified independent   Stand to Sit 6  Modified independent   Additional Comments No DME; demonstrates good safety awareness/insight   Functional Mobility   Functional Mobility 6  Modified independent   Additional Comments Pt performed household functional mobility distances @ Mod I level w/ no DME   Balance   Static Sitting Normal   Dynamic Sitting Good   Static Standing Fair +   Dynamic Standing Fair +   Ambulatory Fair +   Activity Tolerance   Activity Tolerance Patient tolerated treatment well;Patient limited by fatigue   Medical Staff Made Aware DPT Thomas Gong   Nurse Made Aware RN cleared for OT eval   RUE Assessment   RUE Assessment WFL   LUE Assessment   LUE Assessment WFL   Hand Function   Gross Motor Coordination Functional   Fine Motor Coordination Functional   Sensation   Light Touch No apparent deficits   Proprioception   Proprioception No apparent deficits   Vision - Complex Assessment   Ocular Range of Motion WFL   Perception   Inattention/Neglect Appears intact   Cognition   Overall Cognitive Status WFL   Arousal/Participation Alert; Responsive;Arousable; Cooperative   Attention Within functional limits   Orientation Level Oriented X4   Memory Within functional limits   Following Commands Follows all commands and directions without difficulty   Comments Pt very pleasant and cooperative; demonstrates good safety awareness/insight during occupational performance; expresses no concerns regarding ADL/IADL/functional mobility tasks @ this time; receptive of all education provided   Assessment   Prognosis Good   Assessment Pt is a very pleasant and cooperative 80 yo male who presented to [facility] with chest pain and SOB  Pt  has a past medical history of Acute gastritis without hemorrhage, Asthma, CHF (congestive heart failure) ([facility] [address] Diabetes mellitus ([facility] [address] Gastric bypass status for obesity, Hyperlipidemia, Hypertension, Impaired fasting glucose, Obesity, and Viral gastroenteritis   Pt with active OT orders and OT consulted to assess pt's functional status and occupational performance to determine safe d/c needs  Pt seen for co-eval with PT to increase safety, decrease fall risk, and maximize functional/occupational performance 2* medical complexity which is a regression from pt's functional baseline  Pt lives with his wife and daughter in a 1 SH with 6 [address] however reports typically staying on 1st floor  PTA, pt was independent in ADLs/IADLs and functional mobility w/ no DME  (+) driving  Discussed role and scope of OT in which pt was receptive  At this time, pt is not demonstrating any significant occupational deficits and is functioning at a level of Mod I for bed mobility, functional transfers w/ no DME, functional mobility w/ no DME, UB ADLs and LB ADLs  From an OT standpoint, recommend discharge to home with increased social support once medically stable  Pt was receptive regarding education on returning home safely with energy conservation techniques and demonstrated good carryover during occupational/functional performance  At this time, pt demonstrates good insight/safety awareness and does not express any concerns regarding performing ADL/IADL/functional mobility tasks  No further skilled acute care OT services are needed at this time  The patient's raw score on the AM-PAC Daily Activity inpatient short form is 24, standardized score is 57 54, greater than 39 4  Patients at this level are likely to benefit from discharge to home  Please refer to the recommendation of the Occupational Therapist for safe discharge planning  Recommend continued engagement in ADL/IADL/functional mobility tasks with nursing and restorative therapy staff as appropriate to promote the highest level of independence prior to discharge  OT is discharging pt from caseload at this time, please reconsult if needed     Goals   Patient Goals "To get back to doing all my normal activities "   Plan   OT Frequency Eval only   Recommendation   OT Discharge Recommendation No rehabilitation needs  (Pt would benefit from outpatient cardiac rehab)   Equipment Recommended Shower/Tub chair with back ($)   AM-PAC Daily Activity Inpatient   Lower Body Dressing 4   Bathing 4   Toileting 4   Upper Body Dressing 4   Grooming 4   Eating 4   Daily Activity Raw Score 24   Daily Activity Standardized Score (Calc for Raw Score >=11) 57 54   AM-PAC Applied Cognition Inpatient   Following a Speech/Presentation 4   Understanding Ordinary Conversation 4   Taking Medications 4   Remembering Where Things Are Placed or Put Away 4   Remembering List of 4-5 Errands 4   Taking Care of Complicated Tasks 4   Applied Cognition Raw Score 24   Applied Cognition Standardized Score 62 21     Ronn Nelson MS, OTR/L

## 2022-09-03 PROBLEM — I48.0 PAROXYSMAL ATRIAL FIBRILLATION (HCC): Chronic | Status: ACTIVE | Noted: 2022-03-01

## 2022-09-03 PROBLEM — K58.0 IRRITABLE BOWEL SYNDROME WITH DIARRHEA: Chronic | Status: ACTIVE | Noted: 2018-01-30

## 2022-09-03 PROBLEM — M51.26 HNP (HERNIATED NUCLEUS PULPOSUS), LUMBAR: Chronic | Status: ACTIVE | Noted: 2021-02-23

## 2022-09-03 PROBLEM — E78.2 MIXED HYPERLIPIDEMIA: Chronic | Status: ACTIVE | Noted: 2019-04-18

## 2022-09-03 PROBLEM — J45.50 SEVERE PERSISTENT ASTHMA WITHOUT COMPLICATION: Chronic | Status: ACTIVE | Noted: 2021-10-11

## 2022-09-03 PROBLEM — I10 ESSENTIAL HYPERTENSION: Chronic | Status: ACTIVE | Noted: 2018-01-30

## 2022-09-03 PROBLEM — R06.09 DYSPNEA ON EXERTION: Status: RESOLVED | Noted: 2021-10-11 | Resolved: 2022-09-03

## 2022-09-03 PROBLEM — E11.9 TYPE 2 DIABETES MELLITUS, WITHOUT LONG-TERM CURRENT USE OF INSULIN (HCC): Chronic | Status: ACTIVE | Noted: 2018-01-30

## 2022-09-03 PROBLEM — S83.92XA KNEE SPRAIN, BILATERAL: Status: RESOLVED | Noted: 2018-06-14 | Resolved: 2022-09-03

## 2022-09-03 PROBLEM — M48.061 FORAMINAL STENOSIS OF LUMBAR REGION: Status: ACTIVE | Noted: 2021-02-23

## 2022-09-03 PROBLEM — E11.65 TYPE 2 DIABETES MELLITUS WITH HYPERGLYCEMIA (HCC): Status: ACTIVE | Noted: 2017-05-11

## 2022-09-03 PROBLEM — E11.65 TYPE 2 DIABETES MELLITUS WITH HYPERGLYCEMIA (HCC): Chronic | Status: ACTIVE | Noted: 2017-05-11

## 2022-09-03 PROBLEM — R06.00 DYSPNEA ON EXERTION: Status: RESOLVED | Noted: 2021-10-11 | Resolved: 2022-09-03

## 2022-09-03 PROBLEM — R60.0 BILATERAL LEG EDEMA: Status: RESOLVED | Noted: 2020-02-19 | Resolved: 2022-09-03

## 2022-09-03 PROBLEM — N18.31 STAGE 3A CHRONIC KIDNEY DISEASE (HCC): Chronic | Status: ACTIVE | Noted: 2021-12-23

## 2022-09-03 PROBLEM — R25.2 LEG CRAMPS: Status: RESOLVED | Noted: 2022-06-16 | Resolved: 2022-09-03

## 2022-09-03 PROBLEM — I42.9 CARDIOMYOPATHY (HCC): Chronic | Status: ACTIVE | Noted: 2021-07-19

## 2022-09-03 PROBLEM — E87.6 HYPOKALEMIA: Status: RESOLVED | Noted: 2021-11-14 | Resolved: 2022-09-03

## 2022-09-03 PROBLEM — M48.061 FORAMINAL STENOSIS OF LUMBAR REGION: Chronic | Status: ACTIVE | Noted: 2021-02-23

## 2022-09-03 PROBLEM — S83.91XA KNEE SPRAIN, BILATERAL: Status: RESOLVED | Noted: 2018-06-14 | Resolved: 2022-09-03

## 2022-09-03 PROBLEM — M51.26 HNP (HERNIATED NUCLEUS PULPOSUS), LUMBAR: Status: ACTIVE | Noted: 2021-02-23

## 2022-09-03 PROBLEM — R40.0 DAYTIME SLEEPINESS: Status: RESOLVED | Noted: 2019-07-17 | Resolved: 2022-09-03

## 2022-09-03 PROBLEM — R60.0 EDEMA OF BOTH FEET: Status: RESOLVED | Noted: 2020-01-23 | Resolved: 2022-09-03

## 2022-09-03 PROBLEM — I50.32 CHRONIC DIASTOLIC HEART FAILURE (HCC): Chronic | Status: ACTIVE | Noted: 2021-11-12

## 2022-09-03 PROBLEM — Z00.00 WELL ADULT EXAM: Status: RESOLVED | Noted: 2021-05-14 | Resolved: 2022-09-03

## 2022-09-03 PROBLEM — M17.0 PRIMARY OSTEOARTHRITIS OF BOTH KNEES: Chronic | Status: ACTIVE | Noted: 2018-06-14

## 2022-09-03 LAB
ANION GAP SERPL CALCULATED.3IONS-SCNC: 5 MMOL/L (ref 4–13)
BUN SERPL-MCNC: 39 MG/DL (ref 5–25)
CALCIUM SERPL-MCNC: 8.7 MG/DL (ref 8.3–10.1)
CHLORIDE SERPL-SCNC: 99 MMOL/L (ref 96–108)
CO2 SERPL-SCNC: 31 MMOL/L (ref 21–32)
CREAT SERPL-MCNC: 2.06 MG/DL (ref 0.6–1.3)
GFR SERPL CREATININE-BSD FRML MDRD: 35 ML/MIN/1.73SQ M
GLUCOSE SERPL-MCNC: 204 MG/DL (ref 65–140)
GLUCOSE SERPL-MCNC: 206 MG/DL (ref 65–140)
GLUCOSE SERPL-MCNC: 217 MG/DL (ref 65–140)
GLUCOSE SERPL-MCNC: 248 MG/DL (ref 65–140)
GLUCOSE SERPL-MCNC: 288 MG/DL (ref 65–140)
MRSA NOSE QL CULT: NORMAL
POTASSIUM SERPL-SCNC: 3.8 MMOL/L (ref 3.5–5.3)
SODIUM SERPL-SCNC: 135 MMOL/L (ref 135–147)

## 2022-09-03 PROCEDURE — 99232 SBSQ HOSP IP/OBS MODERATE 35: CPT | Performed by: HOSPITALIST

## 2022-09-03 PROCEDURE — 99232 SBSQ HOSP IP/OBS MODERATE 35: CPT | Performed by: INTERNAL MEDICINE

## 2022-09-03 PROCEDURE — 94640 AIRWAY INHALATION TREATMENT: CPT

## 2022-09-03 PROCEDURE — 99223 1ST HOSP IP/OBS HIGH 75: CPT | Performed by: INTERNAL MEDICINE

## 2022-09-03 PROCEDURE — 80048 BASIC METABOLIC PNL TOTAL CA: CPT | Performed by: HOSPITALIST

## 2022-09-03 PROCEDURE — 94760 N-INVAS EAR/PLS OXIMETRY 1: CPT

## 2022-09-03 PROCEDURE — 82948 REAGENT STRIP/BLOOD GLUCOSE: CPT

## 2022-09-03 PROCEDURE — 94150 VITAL CAPACITY TEST: CPT

## 2022-09-03 RX ORDER — PREDNISONE 10 MG/1
10 TABLET ORAL DAILY
Status: DISCONTINUED | OUTPATIENT
Start: 2022-09-11 | End: 2022-09-07 | Stop reason: HOSPADM

## 2022-09-03 RX ORDER — SODIUM CHLORIDE FOR INHALATION 0.9 %
3 VIAL, NEBULIZER (ML) INHALATION EVERY 8 HOURS PRN
Status: DISCONTINUED | OUTPATIENT
Start: 2022-09-03 | End: 2022-09-07 | Stop reason: HOSPADM

## 2022-09-03 RX ORDER — PREDNISONE 20 MG/1
40 TABLET ORAL DAILY
Status: COMPLETED | OUTPATIENT
Start: 2022-09-04 | End: 2022-09-04

## 2022-09-03 RX ORDER — PREDNISONE 20 MG/1
20 TABLET ORAL DAILY
Status: DISCONTINUED | OUTPATIENT
Start: 2022-09-08 | End: 2022-09-07 | Stop reason: HOSPADM

## 2022-09-03 RX ORDER — INSULIN GLARGINE 100 [IU]/ML
10 INJECTION, SOLUTION SUBCUTANEOUS
Status: DISCONTINUED | OUTPATIENT
Start: 2022-09-03 | End: 2022-09-04

## 2022-09-03 RX ORDER — LEVALBUTEROL 1.25 MG/.5ML
1.25 SOLUTION, CONCENTRATE RESPIRATORY (INHALATION) EVERY 8 HOURS PRN
Status: DISCONTINUED | OUTPATIENT
Start: 2022-09-03 | End: 2022-09-07 | Stop reason: HOSPADM

## 2022-09-03 RX ADMIN — ISODIUM CHLORIDE 3 ML: 0.03 SOLUTION RESPIRATORY (INHALATION) at 07:06

## 2022-09-03 RX ADMIN — APIXABAN 5 MG: 5 TABLET, FILM COATED ORAL at 08:04

## 2022-09-03 RX ADMIN — POTASSIUM CHLORIDE 40 MEQ: 1500 TABLET, EXTENDED RELEASE ORAL at 16:48

## 2022-09-03 RX ADMIN — ATORVASTATIN CALCIUM 10 MG: 10 TABLET, FILM COATED ORAL at 08:04

## 2022-09-03 RX ADMIN — BUMETANIDE 3 MG: 2 TABLET ORAL at 08:04

## 2022-09-03 RX ADMIN — LEVALBUTEROL HYDROCHLORIDE 1.25 MG: 1.25 SOLUTION, CONCENTRATE RESPIRATORY (INHALATION) at 14:31

## 2022-09-03 RX ADMIN — APIXABAN 5 MG: 5 TABLET, FILM COATED ORAL at 16:48

## 2022-09-03 RX ADMIN — POTASSIUM CHLORIDE 40 MEQ: 1500 TABLET, EXTENDED RELEASE ORAL at 08:04

## 2022-09-03 RX ADMIN — METOPROLOL SUCCINATE 50 MG: 50 TABLET, EXTENDED RELEASE ORAL at 08:04

## 2022-09-03 RX ADMIN — BUDESONIDE AND FORMOTEROL FUMARATE DIHYDRATE 2 PUFF: 160; 4.5 AEROSOL RESPIRATORY (INHALATION) at 16:50

## 2022-09-03 RX ADMIN — MONTELUKAST 10 MG: 10 TABLET, FILM COATED ORAL at 08:04

## 2022-09-03 RX ADMIN — AMLODIPINE BESYLATE 5 MG: 5 TABLET ORAL at 08:04

## 2022-09-03 RX ADMIN — LEVALBUTEROL HYDROCHLORIDE 1.25 MG: 1.25 SOLUTION, CONCENTRATE RESPIRATORY (INHALATION) at 07:06

## 2022-09-03 RX ADMIN — INSULIN LISPRO 2 UNITS: 100 INJECTION, SOLUTION INTRAVENOUS; SUBCUTANEOUS at 21:29

## 2022-09-03 RX ADMIN — INSULIN LISPRO 2 UNITS: 100 INJECTION, SOLUTION INTRAVENOUS; SUBCUTANEOUS at 08:05

## 2022-09-03 RX ADMIN — UMECLIDINIUM 1 PUFF: 62.5 AEROSOL, POWDER ORAL at 08:05

## 2022-09-03 RX ADMIN — ISODIUM CHLORIDE 3 ML: 0.03 SOLUTION RESPIRATORY (INHALATION) at 14:31

## 2022-09-03 RX ADMIN — BUDESONIDE AND FORMOTEROL FUMARATE DIHYDRATE 2 PUFF: 160; 4.5 AEROSOL RESPIRATORY (INHALATION) at 08:05

## 2022-09-03 RX ADMIN — INSULIN LISPRO 3 UNITS: 100 INJECTION, SOLUTION INTRAVENOUS; SUBCUTANEOUS at 16:51

## 2022-09-03 RX ADMIN — BUMETANIDE 3 MG: 2 TABLET ORAL at 16:47

## 2022-09-03 RX ADMIN — INSULIN GLARGINE 10 UNITS: 100 INJECTION, SOLUTION SUBCUTANEOUS at 21:28

## 2022-09-03 RX ADMIN — INSULIN LISPRO 2 UNITS: 100 INJECTION, SOLUTION INTRAVENOUS; SUBCUTANEOUS at 10:55

## 2022-09-03 RX ADMIN — PREDNISONE 40 MG: 20 TABLET ORAL at 08:04

## 2022-09-03 NOTE — CONSULTS
PULMONOLOGY CONSULT NOTE     Name: Noemy Stuart   Age & Sex: 56 y o  male   MRN: [id number]  Unit/Bed#: [facility] 710-01   Encounter: [id number]        Reason for consultation: SOB    Requesting physician: Katheryn Miller MD     Assessment:   1  Severe persistent asthma  2  ANTONIO on CKD3  3  Peripheral eosinophilia  4  Diastolic heart failure  5  VICKY  6  Paroxysmal atrial fibrillation  7  HTN    Plan:   Likely mild asthma exacerbation  Can continue with prednisone 40 mg  Though would consider more prolonged taper 40 mg x 3 days, 30 mg x 3 days, 20 mg x 3 days, 10 mg x 3 days   Can continue with bronchodilators: Symbicort and incurse  Resume outpatient Spiriva and Symbicort  Stop albuterol regimen as concern for tachycardic response and worsening cardiac disease  Can continue nebulized xopenex   Can continue with montelukast  Consider addition of antihistamine/intranasal steroids as indicated   Was scheduled to be on APAP 4-20 cm H2O as an outpatient   DC ABX  No evidence of pneumonia on imaging, laboratory work up or clinically  Elevated PCT likely in setting of ANTONIO on CKD   Previously known to have Eos >1000  No evidence of hematuria per patient  No prior nasal polyps per patient  Differential includes eosinophilic asthma, EGPA, ABPA  Steroid responsive   Likely would benefit from biologic therapy as an outpatient regarding his asthma  Will need to follow up as an outpatient   All other care per primary team and cardiology    History of Present Illness   HPI:  Noemy Stuart is a 56 y o  male who has a past medical history of T2DM, HTN, VICKY, severe persistent asthma, chronic diastolic heart failure, CKD3, paroxysmal atrial fibrillation, HLD, OA of knees, spinal stenosis, vitamin d and vitamin B12 deficiency presented to [facility] for chest pain with shortness of breath  He reported intermittent substernal chest heaviness with associated shortness of breath   He also was reportedly having increased sputum production  He was admitted for evaluation of his chest pain  He underwent work up for ACS which was found to be negative  He was evaluated by cardiology whom recommended nuclear stress test  He was wheezing on examination per documentation and was started on prednisone in addition to his home regimen of ICS/LAMA/LABA with nebulized ALESSIO  Pulmonary has been consulted for evaluation of persistent shortness of breath  On examination, he reports TELLES  He states it has been on going for the past year or so following COVID vaccination  Hospitalized 11 times in the past year or two  Does cough and when he does has productive sputum  Denies fever, chills, nausea, vomiting  Does report exertional chest pain  Review of systems:  12 point review of systems was completed and was otherwise negative except as listed in HPI  Historical Information   Past Medical History:   Diagnosis Date    Acute gastritis without hemorrhage     last assessed 3/24/17    Asthma     Bilateral leg edema 2/19/2020    [facility]est[facility] failure[facility])     Daytime sleepiness 7/17/2019    Diabetes mellitus ([facility] [address] )     Dyspnea on exertion 10/11/2021    Edema of both feet 1/23/2020    Gastric bypass status for obesity     Hyperlipidemia     Hypertension     Hypokalemia 11/14/2021    Impaired fasting glucose     last assessed 5/10/17    Knee sprain, bilateral 6/14/2018    Leg cramps 6/16/2022    Obesity     Viral gastroenteritis     last assessed 11/4/16     Past Surgical History:   Procedure Laterality Date    CARDIAC CATHETERIZATION N/A 3/9/2022    Procedure: CARDIAC RHC W/ PRESSURE SENSOR;  Surgeon: Kodi Jacques MD;  Location: [facility] CARD[facility];   Service: Cardiology    CARPAL TUNNEL RELEASE      bilateral    GASTRIC BYPASS  2004    with han en y   [facility] HERNIA REPAIR      ventral inscisional    TONSILLECTOMY       Family History   Problem Relation Age of Onset    Stroke Mother     Hypertension Father    [facility] Cancer Father     COPD Father        Social History    Social History:   Social History     Socioeconomic History    Marital status: /Civil Union     Spouse name: Not on file    Number of children: Not on file    Years of education: Not on file    Highest education level: Not on file   Occupational History    Not on file   Tobacco Use    Smoking status: Former Smoker     Packs/day: 0 01     Years: 11 00     Pack years: 0 11     Types: Pipe, Cigars     Start date:      Quit date:      Years since quittin 6    Smokeless tobacco: Never Used    Tobacco comment: cigar once a day   Vaping Use    Vaping Use: Never used   Substance and Sexual Activity    Alcohol use: Not Currently     Alcohol/week: 2 0 standard drinks     Types: 2 Shots of liquor per week     Comment: social    Drug use: No    Sexual activity: Yes     Partners: Female     Birth control/protection: None   Other Topics Concern    Not on file   Social History Narrative    Not on file     Social Determinants of Health     Financial Resource Strain: Not on file   Food Insecurity: No Food Insecurity    Worried About Running Out of Food in the Last Year: Never true    Aria of Food in the Last Year: Never true   Transportation Needs: No Transportation Needs    Lack of Transportation (Medical): No    Lack of Transportation (Non-Medical):  No   Physical Activity: Not on file   Stress: Not on file   Social Connections: Not on file   Intimate Partner Violence: Not on file   Housing Stability: Low Risk     Unable to Pay for Housing in the Last Year: No    Number of Places Lived in the Last Year: 1    Unstable Housing in the Last Year: No       Occupational History: former EMT,  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    Meds/Allergies   Current Facility-Administered Medications   Medication Dose Route Frequency    albuterol (PROVENTIL HFA,VENTOLIN HFA) inhaler 2 puff  2 puff Inhalation Q4H PRN    amLODIPine (NORVASC) tablet 5 mg  5 mg Oral Daily    apixaban (ELIQUIS) tablet 5 mg  5 mg Oral BID    atorvastatin (LIPITOR) tablet 10 mg  10 mg Oral Daily    benzonatate (TESSALON PERLES) capsule 100 mg  100 mg Oral TID PRN    budesonide-formoterol (SYMBICORT) 160-4 5 mcg/act inhaler 2 puff  2 puff Inhalation BID    bumetanide (BUMEX) tablet 3 mg  3 mg Oral BID    ceftriaxone (ROCEPHIN) 1 g/50 mL in dextrose IVPB  1,000 mg Intravenous Q24H    insulin glargine (LANTUS) subcutaneous injection 7 Units 0 07 mL  7 Units Subcutaneous HS    insulin lispro (HumaLOG) 100 units/mL subcutaneous injection 1-5 Units  1-5 Units Subcutaneous HS    insulin lispro (HumaLOG) 100 units/mL subcutaneous injection 1-6 Units  1-6 Units Subcutaneous TID AC    levalbuterol (XOPENEX) inhalation solution 1 25 mg  1 25 mg Nebulization TID    metoprolol succinate (TOPROL-XL) 24 hr tablet 50 mg  50 mg Oral Daily    montelukast (SINGULAIR) tablet 10 mg  10 mg Oral HS    potassium chloride (K-DUR,KLOR-CON) CR tablet 40 mEq  40 mEq Oral BID    predniSONE tablet 40 mg  40 mg Oral Daily    sodium chloride 0 9 % inhalation solution 3 mL  3 mL Nebulization TID    umeclidinium bromide (INCRUSE ELLIPTA) 62 5 mcg/inh inhaler AEPB 1 puff  1 puff Inhalation Daily     Medications Prior to Admission   Medication    Accu-Chek FastClix Lancets MISC    albuterol (PROVENTIL HFA,VENTOLIN HFA) 90 mcg/act inhaler    amLODIPine (NORVASC) 5 mg tablet    apixaban (Eliquis) 5 mg    atorvastatin (LIPITOR) 10 mg tablet    benzonatate (TESSALON PERLES) 100 mg capsule    Blood Glucose Monitoring Suppl (Accu-Chek Guide) w/Device KIT    bumetanide (BUMEX) 1 mg tablet    levalbuterol (Xopenex) 1 25 mg/3 mL nebulizer solution    metoprolol succinate (TOPROL-XL) 50 mg 24 hr tablet    montelukast (SINGULAIR) 10 mg tablet    potassium chloride (K-DUR,KLOR-CON) 20 mEq tablet    sitaGLIPtin (Januvia) 100 mg tablet    Symbicort 160-4 5 MCG/ACT inhaler    tiotropium (Spiriva Respimat) 1 25 MCG/ACT AERS inhaler     Allergies   Allergen Reactions    Azithromycin Other (See Comments)     Shaky, uneasy feeling     Bactrim [Sulfamethoxazole-Trimethoprim] Other (See Comments)     shakiness       Objective    Vitals: Blood pressure 138/73, pulse 86, temperature 97 8 °F (36 6 °C), resp  rate 16, height 6' (1 829 m), weight (!) 139 kg (306 lb), SpO2 94 % , RA, Body mass index is 41 5 kg/m²  Intake/Output Summary (Last 24 hours) at 9/3/2022 1433  Last data filed at 9/3/2022 2021  Gross per 24 hour   Intake 170 ml   Output 900 ml   Net -730 ml       Physical Exam  Vitals and nursing note reviewed  Constitutional:       General: He is not in acute distress  Appearance: Normal appearance  He is well-developed  He is obese  He is not ill-appearing or toxic-appearing  Interventions: He is not intubated  HENT:      Head: Normocephalic and atraumatic  Right Ear: External ear normal       Left Ear: External ear normal       Nose: Nose normal       Mouth/Throat:      Mouth: Mucous membranes are moist       Pharynx: Oropharynx is clear  Eyes:      General: No scleral icterus  Conjunctiva/sclera: Conjunctivae normal    Cardiovascular:      Rate and Rhythm: Normal rate and regular rhythm  Pulses: Normal pulses  Heart sounds: Normal heart sounds  No murmur heard  No friction rub  No gallop  Pulmonary:      Effort: Pulmonary effort is normal  No tachypnea, accessory muscle usage or respiratory distress  He is not intubated  Breath sounds: Normal air entry  No decreased air movement  Decreased breath sounds present  No wheezing, rhonchi or rales  Abdominal:      General: Abdomen is flat  Bowel sounds are normal       Palpations: Abdomen is soft  Musculoskeletal:         General: No swelling or tenderness  Cervical back: Neck supple   No tenderness  Skin:     General: Skin is warm and dry  Neurological:      General: No focal deficit present  Mental Status: He is alert and oriented to person, place, and time  Mental status is at baseline  Labs: I have personally reviewed pertinent lab results  Laboratory and Diagnostics  Results from last 7 days   Lab Units 09/02/22  0641 09/01/22  1721   WBC Thousand/uL 6 96 12 39*   HEMOGLOBIN g/dL 12 6 12 3   HEMATOCRIT % 37 8 37 3   PLATELETS Thousands/uL 212 239   NEUTROS PCT % 94* 88*   MONOS PCT % 2* 7     Results from last 7 days   Lab Units 09/03/22  0519 09/02/22  0641 09/01/22  1721   SODIUM mmol/L 135 130* 128*   POTASSIUM mmol/L 3 8 3 9 3 5   CHLORIDE mmol/L 99 94* 93*   CO2 mmol/L 31 27 21   ANION GAP mmol/L 5 9 14*   BUN mg/dL 39* 28* 20   CREATININE mg/dL 2 06* 2 17* 1 91*   CALCIUM mg/dL 8 7 8 7 8 2*   GLUCOSE RANDOM mg/dL 217* 217* 149*   ALT U/L  --   --  33   AST U/L  --   --  44   ALK PHOS U/L  --   --  147*   ALBUMIN g/dL  --   --  3 0*   TOTAL BILIRUBIN mg/dL  --   --  1 41*                                       Results from last 7 days   Lab Units 09/02/22  0641 09/01/22 1942   PROCALCITONIN ng/ml 1 48* 1 74*       ABG:       Micro:  Results from last 7 days   Lab Units 09/01/22 2227 09/01/22 1942   BLOOD CULTURE   --  No Growth at 24 hrs  No Growth at 24 hrs  MRSA CULTURE ONLY  No Methicillin Resistant Staphlyococcus aureus (MRSA) isolated  --        Imaging and other studies: I have personally reviewed pertinent reports  and I have personally reviewed pertinent films in PACS  9/1/2022 CXR: Lungs are clear  No effusion or pneumothorax  7/29/2022 CXR: The lungs are clear  No pneumothorax or pleural effusion  4/8/2022 CXR: The lungs are clear  Left pulmonary artery CardioMem implant now present  No pneumothorax or pleural effusion  2/12/2022 CTA chest:   PULMONARY ARTERIAL TREE:  Evaluation somewhat limited due to respiratory motion    No pulmonary embolus identified to the level of the segmental pulmonary arteries      LUNGS:  0 6 cm left lower lobe pulmonary nodule (series 4, image 84)  No focal consolidation  There is no tracheal or endobronchial lesion      PLEURA:  Unremarkable      HEART/GREAT VESSELS:  Unremarkable for patient's age  No thoracic aortic aneurysm      MEDIASTINUM AND KHANG:  Small left Bochdalek hernia      CHEST WALL AND LOWER NECK:   Unremarkable  Pulmonary function testing:  10/22/2021  Results:  FVC: 3 31 L       61 % predicted  FEV1: 1 63 L     39 % predicted  FEV1/FVC Ratio: 49 %     After administration of bronchodilator   FVC: 3 89 L, 72 % predicted, 17 % change  FEV1: 2 45 L, 58 % predicted, 50 % change     Lung volumes by body plethysmography:   Total Lung Capacity 98 % predicted   Residual volume 183 % predicted     DLCO corrected for patients hemoglobin level: 65 %    Interpretation:     · Severe obstructive airflow defect on spirometry     · Significant improvement in airflow or forced vital capacity in response to the administration of bronchodilator per ATS standards      · Air trapping as indicated by the lung volumes     · Mild decrease in diffusion capacity     · Flow-volume loop consistent with obstruction      EKG, Pathology, and Other Studies: I have personally reviewed pertinent reports  NM sienna perfusion 9/2/2022    Stress ECG: No ST deviation is noted  The ECG was negative for ischemia  The stress ECG is negative for ischemia after pharmacologic vasodilation, without reproduction of symptoms    Perfusion: There is a left ventricular perfusion defect that is medium in size with moderate reduction in uptake present in the basal to mid inferior location(s) that is fixed suggestive of an inferior infarct  Significant ischemia is not present    Stress Function: Left ventricular function post-stress is abnormal  There was a single regional abnormality during stress   Post-stress ejection fraction is 53 %      Abnormal study due to the presence of an inferior and inferolateral infarct without significant ischemia  HST 8/23/2019  IMPRESSION:  Home sleep apnea testing demonstrates a moderate degree of obstructive sleep apnea  There was only a mild degree of  hypoxia  Treatment is recommended  Code Status: Level 1 - Full Code    VTE Pharmacologic Prophylaxis: RX contraindicated due to: Full AC with eliquis  VTE Mechanical Prophylaxis: sequential compression device    Disclaimer: Portions of the record may have been created with voice recognition software  Occasional wrong word or "sound a like" substitutions may have occurred due to the inherent limitations of voice recognition software  Careful consideration should be taken to recognize, using context, where substitutions have occurred      Anita Jones DO   Pulmonary/Critical Care Fellowship PGY-V  [facility]

## 2022-09-03 NOTE — ASSESSMENT & PLAN NOTE
· Associated with shortness of breath  Mild wheezing on exam   · this has been ongoing intermittently for quite some time  · Unclear etiology  · rule out ACS -> Nuclear stress test done - has fixed defect, no ischemia but since he has not had LHC before, cardio recs LHC  · Does not appear in volume overload state    · Possible poorly controlled asthma  · ISATU score 1  · troponin normal  · EKG nonischemic  · Last echo showed EF of 55% with grade 1 diastolic dysfunction  · Cont prednisone for asthma at present  · PT OT rec cardiac rehab OP  · Since cardiac w/u so far negative, consulted pulmonary & at the same time cardiology placed recs for [facility]

## 2022-09-03 NOTE — ASSESSMENT & PLAN NOTE
Wt Readings from Last 3 Encounters:   09/02/22 (!) 139 kg (306 lb)   08/17/22 (!) 142 kg (312 lb 6 4 oz)   08/08/22 (!) 142 kg (312 lb)     · Clinically appears euvolemic    · Weight is lower than last cardiology appointment on 08/17/2022  · Hyponatremia noted  · No signs of overload  · Continue home dose Bumex 3 mg BID  · Check cardiac BMP  · I&O and daily weight  · Cardiology consult  · Salt and fluid-restricted diet

## 2022-09-03 NOTE — PROGRESS NOTES
[address] [facility]  Progress Note - Nessa Mcgraw 1965, 56 y o  male MRN: [id number]  [facility] Encounter: [id number]  Primary Care Provider: Adele Murray MD   Date and time admitted to hospital: 9/1/2022  5:08 PM    Chronic diastolic heart failure [facility])  Assessment & Plan  Wt Readings from Last 3 Encounters:   09/02/22 (!) 139 kg (306 lb)   08/17/22 (!) 142 kg (312 lb 6 4 oz)   08/08/22 (!) 142 kg (312 lb)     · Clinically appears euvolemic  · Weight is lower than last cardiology appointment on 08/17/2022  · Hyponatremia noted  · No signs of overload  · Continue home dose Bumex 3 mg BID  · Check cardiac BMP  · I&O and daily weight  · Cardiology consult  · Salt and fluid-restricted diet        * Chest pain and shortness of breath  Assessment & Plan  · Associated with shortness of breath  Mild wheezing on exam   · this has been ongoing intermittently for quite some time  · Unclear etiology  · rule out ACS -> Nuclear stress test done - has fixed defect, no ischemia but since he has not had LHC before, cardio recs LHC  · Does not appear in volume overload state  · Possible poorly controlled asthma  · ISATU score 1  · troponin normal  · EKG nonischemic  · Last echo showed EF of 55% with grade 1 diastolic dysfunction  · Cont prednisone for asthma at present  · PT OT rec cardiac rehab OP  · Since cardiac w/u so far negative, consulted pulmonary & at the same time cardiology placed recs for LHC    Chills and leukocytosis  Assessment & Plan  · Patient reports of having chills and increased clear phlegm production    · Chest x-ray does not seem to have focal consolidation  · Mild leukocytosis noted - now better  · Elevated procal (but has CKD)  · On rocephin -> stop that    Paroxysmal atrial fibrillation (HCC)  Assessment & Plan  Continue with home dose metoprolol  Continue home dose Eliquis for anticoagulation    Stage 3a chronic kidney disease ([facility] [address] )  Assessment & Plan  Lab Results   Component Value Date    EGFR 35 09/03/2022    EGFR 33 09/02/2022    EGFR 38 09/01/2022    CREATININE 2 06 (H) 09/03/2022    CREATININE 2 17 (H) 09/02/2022    CREATININE 1 91 (H) 09/01/2022     · Baseline creatinine seems between 1 6-1 9  · Currently at baseline  · Continue home dose Bumex  · Trend BMP  · I&O and daily weight    Severe persistent asthma without complication  Assessment & Plan  · Worsening shortness of breath with increase clear phlegm production  · Last exacerbation requiring tapering PO Steroids in May 2022  · Follows up with Pulmonary outpatient  · Started on PO prednisone 40 mg daily  If improvement in symptoms noted, consider prolonged taper over next 10-14 days  · Incentive spirometry respiratory protocol  · Continue home dose Singulair, Xopenex, Symbicort  · Incruse Ellipta daily  · Procalcitonin elevated, hence started on IV rocephin    Hyponatremia  Assessment & Plan  · Unclear etiology  · Euvolemic hyponatremia, better  · Continue home dose Bumex  · Fluid restriction  · Trend BMP    Type 2 diabetes mellitus, without long-term current use of insulin [facility])  Assessment & Plan  Lab Results   Component Value Date    HGBA1C 5 7 06/16/2022       Recent Labs     09/02/22  1740 09/02/22  2222 09/03/22  0639 09/03/22  1045   POCGLU 299* 267* 206* 204*       Blood Sugar Average: Last 72 hrs:  (P) 242 [id number]   Hold home dose sitagliptin while in hospital   Insulin sliding scale Accu-Cheks  Increase lantus to 10 U HS  Titrate insulin based on blood sugar level  Hypoglycemia protocol  Diabetic diet    Essential hypertension  Assessment & Plan  Continue home dose metoprolol, Bumex and amlodipine with holding parameters  VTE Pharmacologic Prophylaxis:   Moderate Risk (Score 3-4) - Pharmacological DVT Prophylaxis Ordered: apixaban (Eliquis)  Patient Centered Rounds: I performed bedside rounds with nursing staff today    Discussions with Specialists or Other Care Team Provider:     Education and Discussions with Family / Patient: patient  Time Spent for Care: 30 minutes  More than 50% of total time spent on counseling and coordination of care as described above  Current Length of Stay: 2 day(s)  Current Patient Status: Inpatient   Certification Statement: The patient will continue to require additional inpatient hospital stay due to further w/u  Discharge Plan: unclear    Code Status: Level 1 - Full Code    Subjective:   Feels ok at rest, later ambulated & told the nurse that he continues to feel TELLES same as before    Objective:     Vitals:   Temp (24hrs), Av 3 °F (36 8 °C), Min:97 8 °F (36 6 °C), Max:99 1 °F (37 3 °C)    Temp:  [97 8 °F (36 6 °C)-99 1 °F (37 3 °C)] 98 2 °F (36 8 °C)  HR:  [74-86] 74  Resp:  [15-16] 16  BP: (118-141)/(65-86) 124/70  SpO2:  [93 %-98 %] 95 %  Body mass index is 41 5 kg/m²  Input and Output Summary (last 24 hours): Intake/Output Summary (Last 24 hours) at 9/3/2022 1537  Last data filed at 9/3/2022 4852  Gross per 24 hour   Intake 170 ml   Output 900 ml   Net -730 ml       Physical Exam:   Physical Exam  Vitals reviewed  HENT:      Head: Normocephalic and atraumatic  Mouth/Throat:      Mouth: Mucous membranes are moist    Eyes:      Conjunctiva/sclera: Conjunctivae normal    Cardiovascular:      Rate and Rhythm: Normal rate and regular rhythm  Heart sounds: Normal heart sounds  Pulmonary:      Effort: Pulmonary effort is normal  No respiratory distress  Breath sounds: Normal breath sounds  No wheezing  Abdominal:      General: There is no distension  Palpations: Abdomen is soft  Tenderness: There is no abdominal tenderness  Musculoskeletal:      Right lower leg: No edema  Left lower leg: No edema  Neurological:      Mental Status: He is alert            Additional Data:     Labs:  Results from last 7 days   Lab Units 22  0641   WBC Thousand/uL 6 96   HEMOGLOBIN g/dL 12 6 HEMATOCRIT % 37 8   PLATELETS Thousands/uL 212   NEUTROS PCT % 94*   LYMPHS PCT % 3*   MONOS PCT % 2*   EOS PCT % 0     Results from last 7 days   Lab Units 09/03/22  0519 09/02/22  0641 09/01/22  1721   SODIUM mmol/L 135   < > 128*   POTASSIUM mmol/L 3 8   < > 3 5   CHLORIDE mmol/L 99   < > 93*   CO2 mmol/L 31   < > 21   BUN mg/dL 39*   < > 20   CREATININE mg/dL 2 06*   < > 1 91*   ANION GAP mmol/L 5   < > 14*   CALCIUM mg/dL 8 7   < > 8 2*   ALBUMIN g/dL  --   --  3 0*   TOTAL BILIRUBIN mg/dL  --   --  1 41*   ALK PHOS U/L  --   --  147*   ALT U/L  --   --  33   AST U/L  --   --  44   GLUCOSE RANDOM mg/dL 217*   < > 149*    < > = values in this interval not displayed  Results from last 7 days   Lab Units 09/03/22  1045 09/03/22  0639 09/02/22  2222 09/02/22  1740 09/02/22  1047 09/02/22  0644   POC GLUCOSE mg/dl 204* 206* 267* 299* 238* 240*         Results from last 7 days   Lab Units 09/02/22  0641 09/01/22 1942   PROCALCITONIN ng/ml 1 48* 1 74*       Lines/Drains:  Invasive Devices  Report    Peripheral Intravenous Line  Duration           Peripheral IV 09/02/22 Right;Ventral (anterior) Forearm 1 day                  Telemetry:  Telemetry Orders (From admission, onward)             48 Hour Telemetry Monitoring  Continuous x 48 hours        Expiring   References:    Telemetry Guidelines   Question:  Reason for 48 Hour Telemetry  Answer:  Acute MI, chest pain - R/O MI, or unstable angina                 Telemetry Reviewed: Normal Sinus Rhythm  Indication for Continued Telemetry Use: Acute MI/Unstable Angina/Rule out ACS             Imaging: No pertinent imaging reviewed  Recent Cultures (last 7 days):   Results from last 7 days   Lab Units 09/01/22 1942   BLOOD CULTURE  No Growth at 24 hrs  No Growth at 24 hrs         Last 24 Hours Medication List:   Current Facility-Administered Medications   Medication Dose Route Frequency Provider Last Rate    amLODIPine  5 mg Oral Daily Alena Hackett MD  apixaban  5 mg Oral BID Zach Kulkarin MD      atorvastatin  10 mg Oral Daily Zach Kulkarni MD      benzonatate  100 mg Oral TID PRN Zach Kulkarni MD      budesonide-formoterol  2 puff Inhalation BID Zach Kulkarni MD      bumetanide  3 mg Oral BID Zach Kulkarni MD      insulin glargine  10 Units Subcutaneous HS Ailyn Zhang MD      insulin lispro  1-5 Units Subcutaneous HS Zach Kulkarni MD      insulin lispro  1-6 Units Subcutaneous TID [facility] Zach Kulkarni MD      levalbuterol  1 25 mg Nebulization Q8H PRN Harvey Meneses DO      metoprolol succinate  50 mg Oral Daily Zach Kulkarni MD      montelukast  10 mg Oral HS Zach Kulkarni MD      potassium chloride  40 mEq Oral BID MD Julia Gann [START ON 9/4/2022] predniSONE  40 mg Oral Daily Harvey Meneses DO      Followed by   Manuel Hodges ON 9/5/2022] predniSONE  30 mg Oral Daily Harvey Meneses DO      Followed by   Manuel Hodges ON 9/8/2022] predniSONE  20 mg Oral Daily Harvey Meneses DO      Followed by   Manuel Hodges ON 9/11/2022] predniSONE  10 mg Oral Daily Harvey Meneses DO      sodium chloride  3 mL Nebulization TID Zach Kulkarni MD      umeclidinium bromide  1 puff Inhalation Daily Zach Kulkarni MD          Today, Patient Was Seen By: Ailyn Zhang MD    **Please Note: This note may have been constructed using a voice recognition system  **

## 2022-09-03 NOTE — CASE MANAGEMENT
Case Management Assessment & Discharge Planning Note    Patient name Ralu Jarrell  Location [facility]9-97 MRN [id number]  : 1965 Date 9/3/2022       Current Admission Date: 2022  Current Admission Diagnosis:Chest pain and shortness of breath   Patient Active Problem List    Diagnosis Date Noted    Chest pain and shortness of breath 2022    Chills and leukocytosis 2022    Leg cramps 2022    Paroxysmal atrial fibrillation ([address] ) 2022    Stage 3a chronic kidney disease ([address] ) 2021    Hypokalemia 2021    Chronic diastolic heart failure ([address] ) 2021    Moderate persistent asthma 10/11/2021    Dyspnea on exertion 10/11/2021    Hyponatremia 2021    Cardiomyopathy ([facility] [address] ) 2021    Well adult exam 2021    Bilateral leg edema 2020    Edema of both feet 2020    VICKY (obstructive sleep apnea)     Daytime sleepiness 2019    Mixed hyperlipidemia 2019    Morbid obesity ([address] ) 2019    Vitamin B12 deficiency 2019    Vitamin D deficiency 2019    Knee sprain, bilateral 2018    Primary osteoarthritis of both knees 2018    Irritable bowel syndrome with diarrhea 2018    Essential hypertension 2018    Type 2 diabetes mellitus, without long-term current use of insulin ([facility] [address] ) 2018      LOS (days): 2  Geometric Mean LOS (GMLOS) (days): 3 00  Days to GMLOS:1 3     OBJECTIVE:  PATIENT READMITTED TO HOSPITAL  Risk of Unplanned Readmission Score: 26 97         Current admission status: Inpatient       Preferred Pharmacy:   [address], [address]  [address]  [facility] [address]  Phone: [phone] Fax: [phone]    [id number] [address]  [address] [place] [address]  Phone: [phone] Fax: [phone]    Primary Care Provider: Kaylee Beach MD    Primary Insurance: [employer]  Secondary Insurance:     ASSESSMENT:  [place], [address] Representative - Spouse   Primary Phone: [phone] (Home)  Mobile Phone: [phone]                 Readmission Root Cause  30 Day Readmission: Yes  Who directed you to return to the hospital?: Family  Did you understand whom to contact if you had questions or problems?: Yes  Did you get your prescriptions before you left the hospital?: Yes  Were you able to get your prescriptions filled when you left the hospital?: Yes  Did you take your medications as prescribed?: Yes  Were you able to get to your follow-up appointments?: Yes  During previous admission, was a post-acute recommendation made?: No  Patient was readmitted due to: Chest pain SOB, Hyponatremia  Action Plan: Continue to monitor, participate in PT eval    Patient Information  Admitted from[de-identified] Home  Mental Status: Alert  During Assessment patient was accompanied by: Not accompanied during assessment  Assessment information provided by[de-identified] Patient  Primary Caregiver: Self  Support Systems: Spouse/significant other, Family members  [place] of Residence: [facility] do you live in?: [facility] entry access options   Select all that apply : Stairs  Number of steps to enter home : 5  Do the steps have railings?: Yes  Type of Current Residence: 3 story home  Upon entering residence, is there a bedroom on the main floor (no further steps)?: No  A bedroom is located on the following floor levels of residence (select all that apply):: 3rd Floor  Upon entering residence, is there a bathroom on the main floor (no further steps)?: Yes  Number of steps to 2nd floor from main floor: One Flight  Number of steps to 3rd floor from main floor: Two Flights  In the last 12 months, was there a time when you were not able to pay the mortgage or rent on time?: No  In the last 12 months, how many places have you lived?: 1  In the last 12 months, was there a time when you did not have a steady place to sleep or slept in a shelter (including now)?: No  Homeless/housing insecurity resource given?: N/A  Living Arrangements: Lives w/ Spouse/significant other (Lives with children)  Is patient a ?: No    Activities of Daily Living Prior to Admission  Functional Status: Independent  Completes ADLs independently?: Yes  Ambulates independently?: Yes  Does patient use assisted devices?: No  Does patient currently own DME?: No  Does patient have a history of Outpatient Therapy (PT/OT)?: No  Does the patient have a history of Short-Term Rehab?: No  Does patient have a history of HHC?: No  Does patient currently have [facility]?: No    Patient Information Continued  Income Source: Unemployed  Does patient have prescription coverage?: Yes  Within the past 12 months, you worried that your food would run out before you got the money to buy more : Never true  Within the past 12 months, the food you bought just didn't last and you didn't have money to get more : Never true  Food insecurity resource given?: N/A  Does patient receive dialysis treatments?: No  Does patient have a history of substance abuse?: No  Does patient have a history of Mental Health Diagnosis?: No    Means of Transportation  Means of Transport to Appts[de-identified] Drives Self  In the past 12 months, has lack of transportation kept you from medical appointments or from getting medications?: No  In the past 12 months, has lack of transportation kept you from meetings, work, or from getting things needed for daily living?: No  Was application for public transport provided?: N/A      DISCHARGE DETAILS:    Discharge planning discussed with[de-identified] [facility] pt bedside  [place] of Choice: No  Comments - Freedom of Choice: No curernt reccomendations   Disscussed FOC  CM contacted family/caregiver?: No- see comments (Pt is priented and has kept his spouse updated)      Contacts  Patient Contacts: Ivonne Urias Rachael  Relationship to Patient[de-identified] Family (spouse)  Contact Method: Phone  Phone Number: [phone]  Reason/Outcome: Continuity of Care, Emergency [address]         Is the patient interested in [facility] at discharge?: No    DME Referral Provided  Referral made for DME?: No    Additional Comments: Pt to participate in PT eval  Pt has access to 1st floor living   Pt spouse is a ST at [employer]

## 2022-09-03 NOTE — ASSESSMENT & PLAN NOTE
Lab Results   Component Value Date    HGBA1C 5 7 06/16/2022       Recent Labs     09/02/22  1740 09/02/22  2222 09/03/22  0639 09/03/22  1045   POCGLU 299* 267* 206* 204*       Blood Sugar Average: Last 72 hrs:  (P) 242 [id number]   Hold home dose sitagliptin while in hospital   Insulin sliding scale Accu-Cheks  Increase lantus to 10 U HS  Titrate insulin based on blood sugar level    Hypoglycemia protocol  Diabetic diet

## 2022-09-03 NOTE — ASSESSMENT & PLAN NOTE
Lab Results   Component Value Date    EGFR 35 09/03/2022    EGFR 33 09/02/2022    EGFR 38 09/01/2022    CREATININE 2 06 (H) 09/03/2022    CREATININE 2 17 (H) 09/02/2022    CREATININE 1 91 (H) 09/01/2022     · Baseline creatinine seems between 1 6-1 9  · Currently at baseline  · Continue home dose Bumex    · Trend BMP  · I&O and daily weight

## 2022-09-03 NOTE — RESPIRATORY THERAPY NOTE
RT Protocol Note  Aaliyah Colon 56 y o  male MRN: [id number]  Unit/Bed#: [facility] 710-01 Encounter: [id number]    Assessment    Principal Problem:    Chest pain and shortness of breath  Active Problems:    Essential hypertension    Type 2 diabetes mellitus, without long-term current use of insulin (HCC)    Hyponatremia    Moderate persistent asthma    Chronic diastolic heart failure (HCC)    Stage 3a chronic kidney disease (HCC)    Paroxysmal atrial fibrillation (HCC)    Chills and leukocytosis      Home Pulmonary Medications:  Home Devices/Therapy: (P) Other (Comment) (xopenex, albuterol, symbicort)    Past Medical History:   Diagnosis Date    Acute gastritis without hemorrhage     last assessed 3/24/17    Asthma     CHF (congestive heart failure) ([facility] )     Diabetes mellitus ([facility] )     Gastric bypass status for obesity     Hyperlipidemia     Hypertension     Impaired fasting glucose     last assessed 5/10/17    Obesity     Viral gastroenteritis     last assessed 16     Social History     Socioeconomic History    Marital status: /Civil Union     Spouse name: None    Number of children: None    Years of education: None    Highest education level: None   Occupational History    None   Tobacco Use    Smoking status: Former Smoker     Packs/day: 0 01     Years: 11 00     Pack years: 0 11     Types: Pipe, Cigars     Start date:      Quit date:      Years since quittin 6    Smokeless tobacco: Never Used    Tobacco comment: cigar once a day   Vaping Use    Vaping Use: Never used   Substance and Sexual Activity    Alcohol use: Not Currently     Alcohol/week: 2 0 standard drinks     Types: 2 Shots of liquor per week     Comment: social    Drug use: No    Sexual activity: Yes     Partners: Female     Birth control/protection: None   Other Topics Concern    None   Social History Narrative    None     Social Determinants of Health     Financial Resource Strain: Not on file   Food Insecurity: No Food Insecurity Worried About [employer] [employer] in the Last Year: Never true    [address] [facility] [address] in the Last Year: Never true   Transportation Needs: No Transportation Needs    Lack of Transportation (Medical): No    Lack of Transportation (Non-Medical): No   Physical Activity: Not on file   Stress: Not on file   Social Connections: Not on file   Intimate Partner Violence: Not on file   Housing Stability: Low Risk     Unable to Pay for Housing in the Last Year: No    Number of Places Lived in the Last Year: 1    Unstable Housing in the Last Year: No       Subjective         Objective    Physical Exam:   Assessment Type: (P) Assess only  General Appearance: (P) Alert, Awake  Respiratory Pattern: (P) Normal  Chest Assessment: (P) Chest expansion symmetrical  Bilateral Breath Sounds: (P) Diminished, Clear  Cough: (P) Non-productive  O2 Device: (P) ra    Vitals:  Blood pressure 138/73, pulse (P) 86, temperature 97 8 °F (36 6 °C), resp  rate (P) 16, height 6' (1 829 m), weight (!) 139 kg (306 lb), SpO2 (P) 94 %  Imaging and other studies: I have personally reviewed pertinent reports  O2 Device: (P) ra     Plan    Respiratory Plan: (P) Home Bronchodilator Patient pathway        Resp Comments: (P) Pt admitted for SOB  Home meds include xopenex tid, symbicort bid, and prn albuterol inhaler  Pt to continue home regimine and currently measuring peak flow q8

## 2022-09-03 NOTE — CASE MANAGEMENT
Case Management Assessment & Discharge Planning Note    Patient name Johnnie Canales  [facility]N [id number]  : 1965 Date 9/3/2022       Current Admission Date: 2022  Current Admission Diagnosis:Chest pain and shortness of breath   Patient Active Problem List    Diagnosis Date Noted    Chest pain and shortness of breath 2022    Chills and leukocytosis 2022    Leg cramps 2022    Paroxysmal atrial fibrillation ([address] ) 2022    Stage 3a chronic kidney disease ([address] ) 2021    Hypokalemia 2021    Chronic diastolic heart failure ([address] ) 2021    Moderate persistent asthma 10/11/2021    Dyspnea on exertion 10/11/2021    Hyponatremia 2021    Cardiomyopathy ([address] ) 2021    Well adult exam 2021    Bilateral leg edema 2020    Edema of both feet 2020    VICKY (obstructive sleep apnea)     Daytime sleepiness 2019    Mixed hyperlipidemia 2019    Morbid obesity ([address] ) 2019    Vitamin B12 deficiency 2019    Vitamin D deficiency 2019    Knee sprain, bilateral 2018    Primary osteoarthritis of both knees 2018    Irritable bowel syndrome with diarrhea 2018    Essential hypertension 2018    Type 2 diabetes mellitus, without long-term current use of insulin ([address] ) 2018      LOS (days): 2  Geometric Mean LOS (GMLOS) (days): 3 00  Days to GMLOS:1 3     OBJECTIVE:  PATIENT READMITTED TO HOSPITAL  Risk of Unplanned Readmission Score: 26 97         Current admission status: Inpatient       Preferred Pharmacy:   [address], [address]  [address]  [facility] [address]  Phone: [phone] Fax: [phone]    [id number] [address] [facility]  [address]  [address] [place] [address]  Phone: [phone] Fax: [phone]    Primary Care Provider: Vinny Hernandez MD    Primary Insurance: [employer]  Secondary Insurance:     ASSESSMENT:  [place], [address] Representative - Spouse   Primary Phone: [phone] (Home)  Mobile Phone: [phone]                         Readmission Root Cause  30 Day Readmission: Yes  Who directed you to return to the hospital?: Family  Did you understand whom to contact if you had questions or problems?: Yes  Did you get your prescriptions before you left the hospital?: Yes  Were you able to get your prescriptions filled when you left the hospital?: Yes  Did you take your medications as prescribed?: Yes  Were you able to get to your follow-up appointments?: Yes  During previous admission, was a post-acute recommendation made?: No  Patient was readmitted due to: Chest pain SOB, Hyponatremia  Action Plan: Continue to monitor, participate in PT eval    Patient Information  Admitted from[de-identified] Home  Mental Status: Alert  During Assessment patient was accompanied by: Not accompanied during assessment  Assessment information provided by[de-identified] Patient  Primary Caregiver: Self  Support Systems: Spouse/significant other, Family members  [place] of Residence: [facility] do you live in?: [facility] entry access options   Select all that apply : Stairs  Number of steps to enter home : 5  Do the steps have railings?: Yes  Type of Current Residence: 3 story home  Upon entering residence, is there a bedroom on the main floor (no further steps)?: No  A bedroom is located on the following floor levels of residence (select all that apply):: 3rd Floor  Upon entering residence, is there a bathroom on the main floor (no further steps)?: Yes  Number of steps to 2nd floor from main floor: One Flight  Number of steps to 3rd floor from main floor: Two Flights  In the last 12 months, was there a time when you were not able to pay the mortgage or rent on time?: No  In the last 12 months, how many places have you lived?: 1  In the last 12 months, was there a time when you did not have a steady place to sleep or slept in a shelter (including now)?: No  Homeless/housing insecurity resource given?: N/A  Living Arrangements: Lives w/ Spouse/significant other (Lives with children)  Is patient a ?: No    Activities of Daily Living Prior to Admission  Functional Status: Independent  Completes ADLs independently?: Yes  Ambulates independently?: Yes  Does patient use assisted devices?: No  Does patient currently own DME?: No  Does patient have a history of Outpatient Therapy (PT/OT)?: No  Does the patient have a history of Short-Term Rehab?: No  Does patient have a history of HHC?: No  Does patient currently have [address]?: No         Patient Information Continued  Income Source: Unemployed  Does patient have prescription coverage?: Yes  Within the past 12 months, you worried that your food would run out before you got the money to buy more : Never true  Within the past 12 months, the food you bought just didn't last and you didn't have money to get more : Never true  Food insecurity resource given?: N/A  Does patient receive dialysis treatments?: No  Does patient have a history of substance abuse?: No  Does patient have a history of Mental Health Diagnosis?: No         Means of Transportation  Means of Transport to Appts[de-identified] Drives Self  In the past 12 months, has lack of transportation kept you from medical appointments or from getting medications?: No  In the past 12 months, has lack of transportation kept you from meetings, work, or from getting things needed for daily living?: No  Was application for public transport provided?: N/A        DISCHARGE DETAILS:    Discharge planning discussed with[de-identified] [facility] pt bedside  [place] of Choice: No  Comments - Freedom of Choice: No curernt reccomendations   Disscussed FOC  CM contacted family/caregiver?: No- see comments (Pt is priented and has kept his spouse updated) Contacts  Patient Contacts: Courtney Market  Relationship to Patient[de-identified] Family (spouse)  Contact Method: Phone  Phone Number: [phone]  Reason/Outcome: Continuity of Care, Emergency [address]         Is the patient interested in [facility] at discharge?: No    DME Referral Provided  Referral made for DME?: No    Additional Comments: Pt to participate in PT eval  Pt has access to 1st floor living   Pt spouse is a [employer] [place]

## 2022-09-03 NOTE — PROGRESS NOTES
Heart Failure/ Pulmonary Hypertension Progress Note - Shelia Honeycutt 56 y o  male MRN: [id number]    Unit/Bed#: [facility] 710-01 Encounter: [id number]      Assessment:    Principal Problem:    Chest pain and shortness of breath  Active Problems:    Essential hypertension    Type 2 diabetes mellitus, without long-term current use of insulin (HCC)    Hyponatremia    Moderate persistent asthma    Chronic diastolic heart failure (HCC)    Stage 3a chronic kidney disease (HCC)    Paroxysmal atrial fibrillation (HCC)    Chills and leukocytosis    # Chest pain  Unclear etiology  Possibly respiratory related to asthma, +/- viral illness,  versus ischemic etiology, though symptoms not entirely consistent with typical angina  No ischemic EKG changes, troponins negative  Nuclear stress test with mostly fixed defects in the basal to mid inferior and inferolateral walls      # Chronic HFpEF- HFimpeEF, Stage C, NYHA III     Mildly volume up, stable PADs on CardioMems at 15   Diuretic:   recent increase to Bumex 3 mg BID w/ K 40 meq BID    Weight:    314 lbs >>306 lbs   NT proBNP: 9/1/22 2545   7/29/22: 903    609 4/8/22          Studies- personally reviewed by me           Pharm Nuc Stress 9/2/22: fixed defects in the basal to mid inferior and inferolateral walls    RHC 3/9/22:     RA 4     RV 35/4     PA 35/12/21     PCWP 10     PA sat 64%     Travis CO 5 56 L/min     Travis CI 2 2L/min/m2     PVR 1 97 SAGASTUME          TTE 11/12/2021:     LVEF 55%  LVIDd 6 0 cm  Grade 1 DD  Normal RV  Trace TR     TTE 07/19/2021:       LVEF 50%  LVIDd 6 13 cm  Grade 1 DD  Normal RV  Trace MR and TR  Mildly dilated IVC       TTE 03/25/2020:     LVEF 40-45%  LVIDd 6 12 cm   Normal RV            Neurohormonal Blockade:     --Beta Blocker: metoprolol succinate 50 mg daily - off   --ARNi / ACEi / ARB: No       --Aldosterone Antagonist: No       --SGLT2 Inhibitor: No      --Home Diuretic: bumex 3mg po BID with potassium 40 meq BID        Sudden Cardiac Death Risk Reduction:     --LVEF >35%             Electrical Resynchronization:     --Candidacy for BiV device: narrow QRS                Advanced Therapies:    Will continue to monitor   LVEF recovered          # Atrial fibrillation     [id number] = 3 (HF, HTN, DM)       Diagnosed 02/2022      Anticoagulation on   Eliquis       Rate control: metoprolol as above   Rhythm control: No           # Hypertension,   mostly controlled   On amlodipine 5mg daily         # Hyperlipidemia     Lipid panel from 10/01/2021: cholesterol 157; TG 90;     HDL 66; calculated LDL 73      Rx: atorvastatin 10 mg daily             # Diabetes mellitus, type II       Non-insulin dependent      HbA1c 6/16/22: 5 7%          # Obstructive sleep apnea    # Chronic respiratory failure     # Asthma, moderate persistent      # S/p gastric bypass (Samuel-en-Y)surgery      # History of tobacco abuse     # Carpal tunnel syndrome, bilateral      # CKD, Cr 1 56   - 1 94 7/31, 1 92 today   # s/p CardioMems implant 3/9/22     PAD  15mmHg recently       Plan:   Continue current dose of oral bumex  Euvolemic to mildly volume up on exam, continue current bumex dose  Nuclear stress test with mostly fixed defects in the basal to mid inferior to inferolateral walls, reported chest pain on exertion today  Discussed proceeding with cardiac catheterization define coronary anatomy and assess for ischemic disease as cause of chest pain since he is still symptomatic with risk factors  Nephrology consult pre cardiac cath      Subjective:   Patient seen and examined  No significant events overnight  Review of Systems   Constitutional: Negative for chills and fever  Respiratory: Positive for shortness of breath  Cardiovascular: Positive for chest pain  Negative for leg swelling  Gastrointestinal: Negative for abdominal distention, abdominal pain, nausea and vomiting  Neurological: Negative for dizziness and light-headedness  Objective:    Intake/ Output: ?  Weight:  306 lbs    Vitals: Blood pressure 138/73, pulse 86, temperature 97 8 °F (36 6 °C), resp  rate 16, height 6' (1 829 m), weight (!) 139 kg (306 lb), SpO2 94 %  , Body mass index is 41 5 kg/m² , I/O last 3 completed shifts: In: 170 [P O :120; IV Piggyback:50]  Out: 200 [Urine:200]  I/O this shift:  In: -   Out: 900 [Urine:900]  Wt Readings from Last 3 Encounters:   09/02/22 (!) 139 kg (306 lb)   08/17/22 (!) 142 kg (312 lb 6 4 oz)   08/08/22 (!) 142 kg (312 lb)       Intake/Output Summary (Last 24 hours) at 9/3/2022 1322  Last data filed at 9/3/2022 4395  Gross per 24 hour   Intake 170 ml   Output 900 ml   Net -730 ml     I/O last 3 completed shifts: In: 170 [P O :120; IV Piggyback:50]  Out: 200 [Urine:200]    No significant arrhythmias seen on telemetry review         Physical Exam:  Vitals:    09/02/22 2121 09/02/22 2158 09/03/22 0740 09/03/22 1216   BP: 133/84 121/80 118/76 138/73   Pulse: 77 81 84 86   Resp:       Temp: 99 1 °F (37 3 °C) 98 °F (36 7 °C) 98 1 °F (36 7 °C) 97 8 °F (36 6 °C)   TempSrc:       SpO2: 93% 93% 96% 94%   Weight:       Height:           GEN: Fernando Car appears well, alert and oriented x 3, pleasant and cooperative   HEENT: NC/AT, moist mucosa, anicteric sclerae; extraocular muscles intact  NECK: supple, no carotid bruits   HEART: regular rhythm, normal S1 and S2, no murmurs, clicks, gallops or rubs, JVP is mildly elevated    LUNGS: clear to auscultation bilaterally; no wheezes, rales, or rhonchi   ABDOMEN: normal bowel sounds, soft, no tenderness, no distention  EXTREMITIES: peripheral pulses normal; no clubbing, cyanosis, or edema  NEURO: no focal findings   SKIN: normal without suspicious lesions on exposed skin      Current Facility-Administered Medications:     albuterol (PROVENTIL HFA,VENTOLIN HFA) inhaler 2 puff, 2 puff, Inhalation, Q4H PRN, Fernando Ross MD, 2 puff at 09/02/22 0642    amLODIPine (NORVASC) tablet 5 mg, 5 mg, Oral, Daily, Fernando Ross MD, 5 mg at 09/03/22 0804    apixaban (ELIQUIS) tablet 5 mg, 5 mg, Oral, BID, Elam Fleischer, MD, 5 mg at 09/03/22 0804    atorvastatin (LIPITOR) tablet 10 mg, 10 mg, Oral, Daily, Elam Fleischer, MD, 10 mg at 09/03/22 0804    benzonatate (TESSALON PERLES) capsule 100 mg, 100 mg, Oral, TID PRN, Elam Fleischer, MD    budesonide-formoterol [facility]) 160-4 5 mcg/act inhaler 2 puff, 2 puff, Inhalation, BID, Elam Fleischer, MD, 2 puff at 09/03/22 0805    bumetanide (BUMEX) tablet 3 mg, 3 mg, Oral, BID, Elam Fleischer, MD, 3 mg at 09/03/22 0804    ceftriaxone (ROCEPHIN) 1 g/50 mL in dextrose IVPB, 1,000 mg, Intravenous, Q24H, Elam Fleischer, MD, Last Rate: 100 mL/hr at 09/02/22 2116, 1,000 mg at 09/02/22 2116    insulin glargine (LANTUS) subcutaneous injection 7 Units 0 07 mL, 7 Units, Subcutaneous, HS, Elam Fleischer, MD, 7 Units at 09/02/22 2222    insulin lispro (HumaLOG) 100 units/mL subcutaneous injection 1-5 Units, 1-5 Units, Subcutaneous, HS, Elam Fleischer, MD, 2 Units at 09/02/22 2222    insulin lispro (HumaLOG) 100 units/mL subcutaneous injection 1-6 Units, 1-6 Units, Subcutaneous, TID AC, 2 Units at 09/03/22 1055 **AND** Fingerstick Glucose (POCT), , , TID AC, Elam Fleischer, MD    levalbuterol Zelda Listen) inhalation solution 1 25 mg, 1 25 mg, Nebulization, TID, Elam Fleischer, MD, 1 25 mg at 09/03/22 0706    metoprolol succinate (TOPROL-XL) 24 hr tablet 50 mg, 50 mg, Oral, Daily, Elam Fleischer, MD, 50 mg at 09/03/22 0804    montelukast (SINGULAIR) tablet 10 mg, 10 mg, Oral, HS, Elam Fleischer, MD, 10 mg at 09/03/22 0804    potassium chloride (K-DUR,KLOR-CON) CR tablet 40 mEq, 40 mEq, Oral, BID, Elam Fleischer, MD, 40 mEq at 09/03/22 0804    predniSONE tablet 40 mg, 40 mg, Oral, Daily, Elam Fleischer, MD, 40 mg at 09/03/22 0804    sodium chloride 0 9 % inhalation solution 3 mL, 3 mL, Nebulization, TID, Elam Fleischer, MD, 3 mL at 09/03/22 0706    umeclidinium bromide (INCRUSE ELLIPTA) 62 5 mcg/inh inhaler AEPB 1 puff, 1 puff, Inhalation, Daily, Isela Wiggins MD, 1 puff at 09/03/22 0805      Labs & Results:        Results from last 7 days   Lab Units 09/02/22  0641 09/01/22  1721   WBC Thousand/uL 6 96 12 39*   HEMOGLOBIN g/dL 12 6 12 3   HEMATOCRIT % 37 8 37 3   PLATELETS Thousands/uL 212 239         Results from last 7 days   Lab Units 09/03/22  0519 09/02/22  0641 09/01/22  1721   POTASSIUM mmol/L 3 8 3 9 3 5   CHLORIDE mmol/L 99 94* 93*   CO2 mmol/L 31 27 21   BUN mg/dL 39* 28* 20   CREATININE mg/dL 2 06* 2 17* 1 91*   CALCIUM mg/dL 8 7 8 7 8 2*   ALK PHOS U/L  --   --  147*   ALT U/L  --   --  33   AST U/L  --   --  7007 Chikis Hanks MD  Advanced Heart Failure and Mechanical Circulatory Ul  [address]

## 2022-09-03 NOTE — PLAN OF CARE
Problem: Potential for Falls  Goal: Patient will remain free of falls  Description: INTERVENTIONS:  - Educate patient/family on patient safety including physical limitations  - Instruct patient to call for assistance with activity   - Consult OT/PT to assist with strengthening/mobility   - Keep Call bell within reach  - Keep bed low and locked with side rails adjusted as appropriate  - Keep care items and personal belongings within reach  - Initiate and maintain comfort rounds  Problem: INFECTION - ADULT  Goal: Absence or prevention of progression during hospitalization  Description: INTERVENTIONS:  - Assess and monitor for signs and symptoms of infection  - Monitor lab/diagnostic results  - Monitor all insertion sites, i e  indwelling lines, tubes, and drains  - Monitor endotracheal if appropriate and nasal secretions for changes in amount and color  - [place] appropriate cooling/warming therapies per order  - Administer medications as ordered  - Instruct and encourage patient and family to use good hand hygiene technique  - Identify and instruct in appropriate isolation precautions for identified infection/condition  Outcome: Progressing     - Apply yellow socks and bracelet for high fall risk patients  - Consider moving patient to room near nurses station  Outcome: Progressing

## 2022-09-04 LAB
GLUCOSE SERPL-MCNC: 194 MG/DL (ref 65–140)
GLUCOSE SERPL-MCNC: 216 MG/DL (ref 65–140)
GLUCOSE SERPL-MCNC: 246 MG/DL (ref 65–140)
GLUCOSE SERPL-MCNC: 303 MG/DL (ref 65–140)

## 2022-09-04 PROCEDURE — 99232 SBSQ HOSP IP/OBS MODERATE 35: CPT | Performed by: INTERNAL MEDICINE

## 2022-09-04 PROCEDURE — 94150 VITAL CAPACITY TEST: CPT

## 2022-09-04 PROCEDURE — 82948 REAGENT STRIP/BLOOD GLUCOSE: CPT

## 2022-09-04 PROCEDURE — 99232 SBSQ HOSP IP/OBS MODERATE 35: CPT | Performed by: HOSPITALIST

## 2022-09-04 RX ORDER — INSULIN GLARGINE 100 [IU]/ML
12 INJECTION, SOLUTION SUBCUTANEOUS
Status: DISCONTINUED | OUTPATIENT
Start: 2022-09-04 | End: 2022-09-06

## 2022-09-04 RX ORDER — INSULIN LISPRO 100 [IU]/ML
2 INJECTION, SOLUTION INTRAVENOUS; SUBCUTANEOUS
Status: DISCONTINUED | OUTPATIENT
Start: 2022-09-04 | End: 2022-09-06

## 2022-09-04 RX ADMIN — BUDESONIDE AND FORMOTEROL FUMARATE DIHYDRATE 2 PUFF: 160; 4.5 AEROSOL RESPIRATORY (INHALATION) at 08:16

## 2022-09-04 RX ADMIN — APIXABAN 5 MG: 5 TABLET, FILM COATED ORAL at 08:18

## 2022-09-04 RX ADMIN — BUMETANIDE 3 MG: 2 TABLET ORAL at 17:20

## 2022-09-04 RX ADMIN — BUDESONIDE AND FORMOTEROL FUMARATE DIHYDRATE 2 PUFF: 160; 4.5 AEROSOL RESPIRATORY (INHALATION) at 17:20

## 2022-09-04 RX ADMIN — AMLODIPINE BESYLATE 5 MG: 5 TABLET ORAL at 08:18

## 2022-09-04 RX ADMIN — INSULIN LISPRO 2 UNITS: 100 INJECTION, SOLUTION INTRAVENOUS; SUBCUTANEOUS at 17:21

## 2022-09-04 RX ADMIN — INSULIN LISPRO 4 UNITS: 100 INJECTION, SOLUTION INTRAVENOUS; SUBCUTANEOUS at 17:20

## 2022-09-04 RX ADMIN — UMECLIDINIUM 1 PUFF: 62.5 AEROSOL, POWDER ORAL at 08:17

## 2022-09-04 RX ADMIN — INSULIN LISPRO 2 UNITS: 100 INJECTION, SOLUTION INTRAVENOUS; SUBCUTANEOUS at 08:17

## 2022-09-04 RX ADMIN — INSULIN LISPRO 2 UNITS: 100 INJECTION, SOLUTION INTRAVENOUS; SUBCUTANEOUS at 11:12

## 2022-09-04 RX ADMIN — METOPROLOL SUCCINATE 50 MG: 50 TABLET, EXTENDED RELEASE ORAL at 08:17

## 2022-09-04 RX ADMIN — PREDNISONE 40 MG: 20 TABLET ORAL at 08:17

## 2022-09-04 RX ADMIN — INSULIN LISPRO 2 UNITS: 100 INJECTION, SOLUTION INTRAVENOUS; SUBCUTANEOUS at 21:24

## 2022-09-04 RX ADMIN — POTASSIUM CHLORIDE 40 MEQ: 1500 TABLET, EXTENDED RELEASE ORAL at 08:17

## 2022-09-04 RX ADMIN — INSULIN GLARGINE 12 UNITS: 100 INJECTION, SOLUTION SUBCUTANEOUS at 21:23

## 2022-09-04 RX ADMIN — BUMETANIDE 3 MG: 2 TABLET ORAL at 08:18

## 2022-09-04 RX ADMIN — POTASSIUM CHLORIDE 40 MEQ: 1500 TABLET, EXTENDED RELEASE ORAL at 17:20

## 2022-09-04 RX ADMIN — APIXABAN 5 MG: 5 TABLET, FILM COATED ORAL at 17:20

## 2022-09-04 RX ADMIN — MONTELUKAST 10 MG: 10 TABLET, FILM COATED ORAL at 08:18

## 2022-09-04 RX ADMIN — ATORVASTATIN CALCIUM 10 MG: 10 TABLET, FILM COATED ORAL at 08:17

## 2022-09-04 NOTE — PLAN OF CARE
Problem: PAIN - ADULT  Goal: Verbalizes/displays adequate comfort level or baseline comfort level  Description: Interventions:  - Encourage patient to monitor pain and request assistance  - Assess pain using appropriate pain scale  - Administer analgesics based on type and severity of pain and evaluate response  - Implement non-pharmacological measures as appropriate and evaluate response  - Consider cultural and social influences on pain and pain management  - Notify physician/advanced practitioner if interventions unsuccessful or patient reports new pain  Outcome: Progressing     Problem: INFECTION - ADULT  Goal: Absence or prevention of progression during hospitalization  Description: INTERVENTIONS:  - Assess and monitor for signs and symptoms of infection  - Monitor lab/diagnostic results  - Monitor all insertion sites, i e  indwelling lines, tubes, and drains  - Monitor endotracheal if appropriate and nasal secretions for changes in amount and color  - [place] appropriate cooling/warming therapies per order  - Administer medications as ordered  - Instruct and encourage patient and family to use good hand hygiene technique  - Identify and instruct in appropriate isolation precautions for identified infection/condition  Outcome: Progressing

## 2022-09-04 NOTE — ASSESSMENT & PLAN NOTE
· Worsening shortness of breath with increase clear phlegm production  · Last exacerbation requiring tapering PO Steroids in May 2022  · Follows up with Pulmonary outpatient  · Started on PO prednisone 40 mg daily  · Incentive spirometry respiratory protocol    · Continue home dose Singulair, Xopenex, Symbicort  · Incruse Ellipta daily  · Procalcitonin elevated, hence started on IV rocephin - now stopped  · Pulm consulted as no improvement in symptoms -> rec prednisone taper by 3 days, continue triple inhaler & discuss other options at OP appt

## 2022-09-04 NOTE — ASSESSMENT & PLAN NOTE
· Associated with shortness of breath  Mild wheezing on exam   · this has been ongoing intermittently for quite some time  · Unclear etiology  · rule out ACS -> Nuclear stress test done - has fixed defect, no ischemia but since he has not had LHC before, cardio recs LHC  · Does not appear in volume overload state    · Possible poorly controlled asthma  · ISATU score 1  · troponin normal  · EKG nonischemic  · Last echo showed EF of 55% with grade 1 diastolic dysfunction  · Cont prednisone for asthma at present  · PT OT rec cardiac rehab OP  · Since cardiac w/u so far negative, consulted pulmonary & at the same time cardiology placed recs for [address] [facility]  · Plan for LHC on tues, nephro consult precath  · Pulm - prednisone taper, cont triple inhaler, discuss other options outpatient

## 2022-09-04 NOTE — PROGRESS NOTES
Heart Failure/ Pulmonary Hypertension Progress Note - Walker Cabello 56 y o  male MRN: [id number]    Unit/Bed#: [facility] 710-01 Encounter: [id number]      Assessment:    Principal Problem:    Chest pain and shortness of breath  Active Problems:    Essential hypertension    Type 2 diabetes mellitus, without long-term current use of insulin (HCC)    VICKY (obstructive sleep apnea)    Hyponatremia    Severe persistent asthma without complication    Chronic diastolic heart failure (HCC)    Stage 3a chronic kidney disease (HCC)    Paroxysmal atrial fibrillation (HCC)    Chills and leukocytosis    # Chest pain  Unclear etiology  Possibly respiratory related to asthma, +/- viral illness,  versus ischemic etiology, though symptoms not entirely consistent with typical angina  No ischemic EKG changes, troponins negative  Nuclear stress test with mostly fixed defects in the basal to mid inferior and inferolateral walls      # Chronic HFpEF- HFimpeEF, Stage C, NYHA III     Mildly volume up, stable PADs on CardioMems at 15   Diuretic:   recent increase to Bumex 3 mg BID w/ K 40 meq BID    Weight:    314 lbs >>306 lbs   NT proBNP: 9/1/22 2545   7/29/22: 903    609 4/8/22          Studies- personally reviewed by me           Pharm Nuc Stress 9/2/22: fixed defects in the basal to mid inferior and inferolateral walls    RHC 3/9/22:     RA 4     RV 35/4     PA 35/12/21     PCWP 10     PA sat 64%     Travis CO 5 56 L/min     Travis CI 2 2L/min/m2     PVR 1 97 SAGASTUME          TTE 11/12/2021:     LVEF 55%  LVIDd 6 0 cm  Grade 1 DD  Normal RV  Trace TR     TTE 07/19/2021:       LVEF 50%  LVIDd 6 13 cm  Grade 1 DD  Normal RV  Trace MR and TR  Mildly dilated IVC       TTE 03/25/2020:     LVEF 40-45%  LVIDd 6 12 cm   Normal RV            Neurohormonal Blockade:     --Beta Blocker: metoprolol succinate 50 mg daily - off   --ARNi / ACEi / ARB: No       --Aldosterone Antagonist: No       --SGLT2 Inhibitor: No      --Home Diuretic: bumex 3mg po BID with potassium 40 meq BID        Sudden Cardiac Death Risk Reduction:     --LVEF >35%             Electrical Resynchronization:     --Candidacy for BiV device: narrow QRS                Advanced Therapies:    Will continue to monitor   LVEF recovered          # Atrial fibrillation     [id number] = 3 (HF, HTN, DM)       Diagnosed 02/2022      Anticoagulation on   Eliquis       Rate control: metoprolol as above   Rhythm control: No           # Hypertension,   mostly controlled   On amlodipine 5mg daily         # Hyperlipidemia     Lipid panel from 10/01/2021: cholesterol 157; TG 90;     HDL 66; calculated LDL 73      Rx: atorvastatin 10 mg daily             # Diabetes mellitus, type II       Non-insulin dependent      HbA1c 6/16/22: 5 7%          # Obstructive sleep apnea    # Chronic respiratory failure     # Asthma, moderate persistent   # S/p gastric bypass (Samuel-en-Y)surgery      # History of tobacco abuse     # Carpal tunnel syndrome, bilateral      # CKD, Cr 1 56   - 1 94 7/31, 1 92 today   # s/p CardioMems implant 3/9/22     PAD  15mmHg recently       Plan:   Continue current dose of oral bumex  Euvolemic on exam  Nuclear stress test with mostly fixed defects in the basal to mid inferior to inferolateral walls  Discussed proceeding with cardiac catheterization define coronary anatomy and assess for ischemic disease as cause of chest pain since he was having recurrent chest pain with exertion given risk factors  Nephrology consult pre cardiac cath  Steroids per pulmonary    Subjective:   Patient seen and examined  No significant events overnight  Review of Systems   Constitutional: Negative for chills and fever  Respiratory: Positive for shortness of breath  Cardiovascular: Positive for chest pain  Negative for leg swelling  Gastrointestinal: Negative for abdominal distention, abdominal pain, nausea and vomiting  Neurological: Negative for dizziness and light-headedness  Objective:    Intake/ Output: 345/900? Weight:  306 lbs    Vitals: Blood pressure 123/75, pulse 75, temperature 97 8 °F (36 6 °C), resp  rate 16, height 6' (1 829 m), weight (!) 139 kg (306 lb), SpO2 97 %  , Body mass index is 41 5 kg/m² , I/O last 3 completed shifts: In: 515 [P O :465; IV Piggyback:50]  Out: 900 [Urine:900]  I/O this shift:  In: -   Out: 1100 [Urine:1100]  Wt Readings from Last 3 Encounters:   09/02/22 (!) 139 kg (306 lb)   08/17/22 (!) 142 kg (312 lb 6 4 oz)   08/08/22 (!) 142 kg (312 lb)       Intake/Output Summary (Last 24 hours) at 9/4/2022 1324  Last data filed at 9/4/2022 1101  Gross per 24 hour   Intake 345 ml   Output 1100 ml   Net -755 ml     I/O last 3 completed shifts:   In: 515 [P O :465; IV Piggyback:50]  Out: 900 [Urine:900]        Physical Exam:  Vitals:    09/03/22 1502 09/03/22 1848 09/03/22 2159 09/04/22 0744   BP: 124/70 125/70 126/69 123/75   Pulse: 74 80 66 75   Resp:    16   Temp: 98 2 °F (36 8 °C) 98 6 °F (37 °C) 97 8 °F (36 6 °C) 97 8 °F (36 6 °C)   TempSrc:       SpO2: 95% 96% 95% 97%   Weight:       Height:           GEN: Jonathon Pae appears well, alert and oriented x 3, pleasant and cooperative   HEENT: NC/AT, moist mucosa, anicteric sclerae; extraocular muscles intact  NECK: supple, no carotid bruits   HEART: regular rhythm, normal S1 and S2, no murmurs, clicks, gallops or rubs, JVP is nonelevated    LUNGS: clear to auscultation bilaterally; no wheezes, rales, or rhonchi   ABDOMEN: normal bowel sounds, soft, no tenderness, no distention  EXTREMITIES: peripheral pulses normal; no clubbing, cyanosis, or edema  NEURO: no focal findings   SKIN: normal without suspicious lesions on exposed skin      Current Facility-Administered Medications:     amLODIPine (NORVASC) tablet 5 mg, 5 mg, Oral, Daily, Martha Brand MD, 5 mg at 09/04/22 0818    apixaban (ELIQUIS) tablet 5 mg, 5 mg, Oral, BID, Martha Brand MD, 5 mg at 09/04/22 0818    atorvastatin (LIPITOR) tablet 10 mg, 10 mg, Oral, Daily, Anette Severino MD, 10 mg at 09/04/22 0817    benzonatate (TESSALON PERLES) capsule 100 mg, 100 mg, Oral, TID PRN, Anette Severino MD    budesonide-formoterol [facility]) 160-4 5 mcg/act inhaler 2 puff, 2 puff, Inhalation, BID, Anette Severino MD, 2 puff at 09/04/22 0816    bumetanide (BUMEX) tablet 3 mg, 3 mg, Oral, BID, Anette Severino MD, 3 mg at 09/04/22 0818    insulin glargine (LANTUS) subcutaneous injection 10 Units 0 1 mL, 10 Units, Subcutaneous, HS, Tiago Gilbert MD, 10 Units at 09/03/22 2128    insulin lispro (HumaLOG) 100 units/mL subcutaneous injection 1-5 Units, 1-5 Units, Subcutaneous, HS, Anette Severino MD, 2 Units at 09/03/22 2129    insulin lispro (HumaLOG) 100 units/mL subcutaneous injection 1-6 Units, 1-6 Units, Subcutaneous, TID AC, 2 Units at 09/04/22 1112 **AND** Fingerstick Glucose (POCT), , , TID AC, Anette Severino MD    levalbuterol Monique Ruthy) inhalation solution 1 25 mg, 1 25 mg, Nebulization, Q8H PRN, Anita Jones,     metoprolol succinate (TOPROL-XL) 24 hr tablet 50 mg, 50 mg, Oral, Daily, Anette Severino MD, 50 mg at 09/04/22 0817    montelukast (SINGULAIR) tablet 10 mg, 10 mg, Oral, HS, Anette Severino MD, 10 mg at 09/04/22 0818    potassium chloride (K-DUR,KLOR-CON) CR tablet 40 mEq, 40 mEq, Oral, BID, Anette Severino MD, 40 mEq at 09/04/22 0817    [COMPLETED] predniSONE tablet 40 mg, 40 mg, Oral, Daily, 40 mg at 09/04/22 0817 **FOLLOWED BY** [START ON 9/5/2022] predniSONE tablet 30 mg, 30 mg, Oral, Daily **FOLLOWED BY** [START ON 9/8/2022] predniSONE tablet 20 mg, 20 mg, Oral, Daily **FOLLOWED BY** [START ON 9/11/2022] predniSONE tablet 10 mg, 10 mg, Oral, Daily, Anita Jones,     sodium chloride 0 9 % inhalation solution 3 mL, 3 mL, Nebulization, Q8H PRN, Perla Patricia MD    umeclidinium bromide (INCRUSE ELLIPTA) 62 5 mcg/inh inhaler AEPB 1 puff, 1 puff, Inhalation, Daily, Anette Severino MD, 1 puff at 09/04/22 0817      Labs & Results: Results from last 7 days   Lab Units 09/02/22  0641 09/01/22  1721   WBC Thousand/uL 6 96 12 39*   HEMOGLOBIN g/dL 12 6 12 3   HEMATOCRIT % 37 8 37 3   PLATELETS Thousands/uL 212 239         Results from last 7 days   Lab Units 09/03/22  0519 09/02/22  0641 09/01/22  1721   POTASSIUM mmol/L 3 8 3 9 3 5   CHLORIDE mmol/L 99 94* 93*   CO2 mmol/L 31 27 21   BUN mg/dL 39* 28* 20   CREATININE mg/dL 2 06* 2 17* 1 91*   CALCIUM mg/dL 8 7 8 7 8 2*   ALK PHOS U/L  --   --  147*   ALT U/L  --   --  33   AST U/L  --   --  7007 Chikis Hanks MD  Advanced Heart Failure and Mechanical [place]

## 2022-09-04 NOTE — PROGRESS NOTES
[address] [facility]  Progress Note - Drema Root 1965, 56 y o  male MRN: [id number]  [facility] Encounter: [id number]  Primary Care Provider: Lizzie Britton MD   Date and time admitted to hospital: 9/1/2022  5:08 PM    Chronic diastolic heart failure [facility])  Assessment & Plan  Wt Readings from Last 3 Encounters:   09/02/22 (!) 139 kg (306 lb)   08/17/22 (!) 142 kg (312 lb 6 4 oz)   08/08/22 (!) 142 kg (312 lb)     · Clinically appears euvolemic  · Weight is lower than last cardiology appointment on 08/17/2022  · Hyponatremia noted  · No signs of overload  · Continue home dose Bumex 3 mg BID  · Check cardiac BMP  · I&O and daily weight  · Cardiology consult  · Salt and fluid-restricted diet        * Chest pain and shortness of breath  Assessment & Plan  · Associated with shortness of breath  Mild wheezing on exam   · this has been ongoing intermittently for quite some time  · Unclear etiology  · rule out ACS -> Nuclear stress test done - has fixed defect, no ischemia but since he has not had LHC before, cardio recs LHC  · Does not appear in volume overload state  · Possible poorly controlled asthma  · ISATU score 1  · troponin normal  · EKG nonischemic  · Last echo showed EF of 55% with grade 1 diastolic dysfunction  · Cont prednisone for asthma at present  · PT OT rec cardiac rehab OP  · Since cardiac w/u so far negative, consulted pulmonary & at the same time cardiology placed recs for LHC  · Plan for LHC on tues, nephro consult precath  · Pulm - prednisone taper, cont triple inhaler, discuss other options outpatient    Chills and leukocytosis  Assessment & Plan  · Patient reports of having chills and increased clear phlegm production    · Chest x-ray does not seem to have focal consolidation  · Mild leukocytosis noted - now better  · Elevated procal (but has CKD)  · On rocephin -> stop that    Paroxysmal atrial fibrillation [facility])  Assessment & Plan  Continue with home dose metoprolol  Continue home dose Eliquis for anticoagulation    Stage 3a chronic kidney disease [facility])  Assessment & Plan  Lab Results   Component Value Date    EGFR 35 09/03/2022    EGFR 33 09/02/2022    EGFR 38 09/01/2022    CREATININE 2 06 (H) 09/03/2022    CREATININE 2 17 (H) 09/02/2022    CREATININE 1 91 (H) 09/01/2022     · Baseline creatinine seems between 1 6-1 9  · Currently at baseline  · Continue home dose Bumex  · Trend BMP  · I&O and daily weight    Severe persistent asthma without complication  Assessment & Plan  · Worsening shortness of breath with increase clear phlegm production  · Last exacerbation requiring tapering PO Steroids in May 2022  · Follows up with Pulmonary outpatient  · Started on PO prednisone 40 mg daily  · Incentive spirometry respiratory protocol  · Continue home dose Singulair, Xopenex, Symbicort  · Incruse Ellipta daily  · Procalcitonin elevated, hence started on IV rocephin - now stopped  · Pulm consulted as no improvement in symptoms -> rec prednisone taper by 3 days, continue triple inhaler & discuss other options at OP appt    Hyponatremia  Assessment & Plan  · Unclear etiology  · Euvolemic hyponatremia, better  · Continue home dose Bumex  · Fluid restriction  · Trend BMP    Type 2 diabetes mellitus, without long-term current use of insulin [facility])  Assessment & Plan  Lab Results   Component Value Date    HGBA1C 5 7 06/16/2022       Recent Labs     09/03/22  1622 09/03/22  2047 09/04/22  0714 09/04/22  1102   POCGLU 248* 288* 194* 216*       Blood Sugar Average: Last 72 hrs:  (P) 240   Hold home dose sitagliptin while in hospital   Insulin sliding scale Accu-Cheks  Increase lantus to 12 U HS, add humalog 2 U TIDAC  Titrate insulin based on blood sugar level  Hypoglycemia protocol  Diabetic diet    Essential hypertension  Assessment & Plan  Continue home dose metoprolol, Bumex and amlodipine with holding parameters            VTE Pharmacologic Prophylaxis: Moderate Risk (Score 3-4) - Pharmacological DVT Prophylaxis Ordered: apixaban (Eliquis)  Patient Centered Rounds: I performed bedside rounds with nursing staff today  Discussions with Specialists or Other Care Team Provider:     Education and Discussions with Family / Patient: patient  Time Spent for Care: 30 minutes  More than 50% of total time spent on counseling and coordination of care as described above  Current Length of Stay: 3 day(s)  Current Patient Status: Inpatient   Certification Statement: The patient will continue to require additional inpatient hospital stay due to above work up  Discharge Plan: Anticipate discharge in 48 hrs to home  Code Status: Level 1 - Full Code    Subjective:   Feels ok, continues to have TELLES & mild chest discomfort    Objective:     Vitals:   Temp (24hrs), Av °F (36 7 °C), Min:97 8 °F (36 6 °C), Max:98 6 °F (37 °C)    Temp:  [97 8 °F (36 6 °C)-98 6 °F (37 °C)] 97 8 °F (36 6 °C)  HR:  [66-80] 68  Resp:  [16] 16  BP: (123-135)/(69-81) 135/81  SpO2:  [94 %-97 %] 94 %  Body mass index is 41 5 kg/m²  Input and Output Summary (last 24 hours): Intake/Output Summary (Last 24 hours) at 2022 1642  Last data filed at 2022 1101  Gross per 24 hour   Intake 225 ml   Output 1100 ml   Net -875 ml       Physical Exam:   Physical Exam  Vitals reviewed  HENT:      Head: Normocephalic and atraumatic  Mouth/Throat:      Mouth: Mucous membranes are moist    Eyes:      Conjunctiva/sclera: Conjunctivae normal    Cardiovascular:      Rate and Rhythm: Normal rate and regular rhythm  Heart sounds: Normal heart sounds  Pulmonary:      Effort: Pulmonary effort is normal  No respiratory distress  Breath sounds: Normal breath sounds  No wheezing  Abdominal:      General: There is no distension  Palpations: Abdomen is soft  Tenderness: There is no abdominal tenderness  Musculoskeletal:      Right lower leg: No edema        Left lower leg: No edema  Neurological:      Mental Status: He is alert and oriented to person, place, and time  Additional Data:     Labs:  Results from last 7 days   Lab Units 09/02/22  0641   WBC Thousand/uL 6 96   HEMOGLOBIN g/dL 12 6   HEMATOCRIT % 37 8   PLATELETS Thousands/uL 212   NEUTROS PCT % 94*   LYMPHS PCT % 3*   MONOS PCT % 2*   EOS PCT % 0     Results from last 7 days   Lab Units 09/03/22  0519 09/02/22  0641 09/01/22  1721   SODIUM mmol/L 135   < > 128*   POTASSIUM mmol/L 3 8   < > 3 5   CHLORIDE mmol/L 99   < > 93*   CO2 mmol/L 31   < > 21   BUN mg/dL 39*   < > 20   CREATININE mg/dL 2 06*   < > 1 91*   ANION GAP mmol/L 5   < > 14*   CALCIUM mg/dL 8 7   < > 8 2*   ALBUMIN g/dL  --   --  3 0*   TOTAL BILIRUBIN mg/dL  --   --  1 41*   ALK PHOS U/L  --   --  147*   ALT U/L  --   --  33   AST U/L  --   --  44   GLUCOSE RANDOM mg/dL 217*   < > 149*    < > = values in this interval not displayed  Results from last 7 days   Lab Units 09/04/22  1630 09/04/22  1102 09/04/22  0714 09/03/22  2047 09/03/22  1622 09/03/22  1045 09/03/22  0639 09/02/22  2222 09/02/22  1740 09/02/22  1047 09/02/22  0644   POC GLUCOSE mg/dl 303* 216* 194* 288* 248* 204* 206* 267* 299* 238* 240*         Results from last 7 days   Lab Units 09/02/22  0641 09/01/22  1942   PROCALCITONIN ng/ml 1 48* 1 74*       Lines/Drains:  Invasive Devices  Report    Peripheral Intravenous Line  Duration           Peripheral IV 09/02/22 Right;Ventral (anterior) Forearm 2 days                      Imaging: No pertinent imaging reviewed  Recent Cultures (last 7 days):   Results from last 7 days   Lab Units 09/01/22  1942   BLOOD CULTURE  No Growth at 48 hrs  No Growth at 48 hrs         Last 24 Hours Medication List:   Current Facility-Administered Medications   Medication Dose Route Frequency Provider Last Rate    amLODIPine  5 mg Oral Daily Brayan Rose MD      apixaban  5 mg Oral BID Brayan Rose MD      atorvastatin  10 mg Oral Daily Magali Irizarry MD      benzonatate  100 mg Oral TID PRN Magali Irizarry MD      budesonide-formoterol  2 puff Inhalation BID Magali Irizarry MD      bumetanide  3 mg Oral BID Magali Irizarry MD      insulin glargine  12 Units Subcutaneous HS Klever Rizvi MD      insulin lispro  1-5 Units Subcutaneous HS Magali Irizarry MD      insulin lispro  1-6 Units Subcutaneous TID [facility] Magali Irizarry MD      insulin lispro  2 Units Subcutaneous TID With Meals Klever Rizvi MD      levalbuterol  1 25 mg Nebulization Q8H PRN Zoraida Camps, DO      metoprolol succinate  50 mg Oral Daily Magali Irizarry MD      montelukast  10 mg Oral HS Magali Irizarry MD      potassium chloride  40 mEq Oral BID Magali Irizarry MD     [facility] [START ON 9/5/2022] predniSONE  30 mg Oral Daily Zoraida Camps, DO      Followed by   Katie Cuellar ON 9/8/2022] predniSONE  20 mg Oral Daily Zoraida Camps, DO      Followed by   Katie Cuellar ON 9/11/2022] predniSONE  10 mg Oral Daily Zoraida Camps, DO      sodium chloride  3 mL Nebulization Q8H PRN Klever Rizvi MD      umeclidinium bromide  1 puff Inhalation Daily Magali Irizarry MD          Today, Patient Was Seen By: Klever Rizvi MD    **Please Note: This note may have been constructed using a voice recognition system  **

## 2022-09-04 NOTE — ASSESSMENT & PLAN NOTE
Lab Results   Component Value Date    HGBA1C 5 7 06/16/2022       Recent Labs     09/03/22  1622 09/03/22 2047 09/04/22  0714 09/04/22  1102   POCGLU 248* 288* 194* 216*       Blood Sugar Average: Last 72 hrs:  (P) 240   Hold home dose sitagliptin while in hospital   Insulin sliding scale Accu-Cheks  Increase lantus to 12 U HS, add humalog 2 U TIDAC  Titrate insulin based on blood sugar level    Hypoglycemia protocol  Diabetic diet

## 2022-09-05 LAB
ANION GAP SERPL CALCULATED.3IONS-SCNC: 6 MMOL/L (ref 4–13)
BUN SERPL-MCNC: 41 MG/DL (ref 5–25)
CALCIUM SERPL-MCNC: 8.3 MG/DL (ref 8.3–10.1)
CHLORIDE SERPL-SCNC: 102 MMOL/L (ref 96–108)
CO2 SERPL-SCNC: 27 MMOL/L (ref 21–32)
CREAT SERPL-MCNC: 1.66 MG/DL (ref 0.6–1.3)
GFR SERPL CREATININE-BSD FRML MDRD: 45 ML/MIN/1.73SQ M
GLUCOSE SERPL-MCNC: 189 MG/DL (ref 65–140)
GLUCOSE SERPL-MCNC: 191 MG/DL (ref 65–140)
GLUCOSE SERPL-MCNC: 211 MG/DL (ref 65–140)
GLUCOSE SERPL-MCNC: 234 MG/DL (ref 65–140)
GLUCOSE SERPL-MCNC: 325 MG/DL (ref 65–140)
POTASSIUM SERPL-SCNC: 3.6 MMOL/L (ref 3.5–5.3)
SODIUM SERPL-SCNC: 135 MMOL/L (ref 135–147)

## 2022-09-05 PROCEDURE — 99255 IP/OBS CONSLTJ NEW/EST HI 80: CPT | Performed by: INTERNAL MEDICINE

## 2022-09-05 PROCEDURE — 82948 REAGENT STRIP/BLOOD GLUCOSE: CPT

## 2022-09-05 PROCEDURE — 80061 LIPID PANEL: CPT | Performed by: STUDENT IN AN ORGANIZED HEALTH CARE EDUCATION/TRAINING PROGRAM

## 2022-09-05 PROCEDURE — 80048 BASIC METABOLIC PNL TOTAL CA: CPT | Performed by: INTERNAL MEDICINE

## 2022-09-05 PROCEDURE — 99232 SBSQ HOSP IP/OBS MODERATE 35: CPT | Performed by: HOSPITALIST

## 2022-09-05 PROCEDURE — 83721 ASSAY OF BLOOD LIPOPROTEIN: CPT | Performed by: STUDENT IN AN ORGANIZED HEALTH CARE EDUCATION/TRAINING PROGRAM

## 2022-09-05 RX ADMIN — INSULIN LISPRO 5 UNITS: 100 INJECTION, SOLUTION INTRAVENOUS; SUBCUTANEOUS at 16:41

## 2022-09-05 RX ADMIN — INSULIN LISPRO 2 UNITS: 100 INJECTION, SOLUTION INTRAVENOUS; SUBCUTANEOUS at 07:57

## 2022-09-05 RX ADMIN — PREDNISONE 30 MG: 20 TABLET ORAL at 08:01

## 2022-09-05 RX ADMIN — POTASSIUM CHLORIDE 40 MEQ: 1500 TABLET, EXTENDED RELEASE ORAL at 17:13

## 2022-09-05 RX ADMIN — APIXABAN 5 MG: 5 TABLET, FILM COATED ORAL at 08:00

## 2022-09-05 RX ADMIN — POTASSIUM CHLORIDE 40 MEQ: 1500 TABLET, EXTENDED RELEASE ORAL at 08:00

## 2022-09-05 RX ADMIN — INSULIN LISPRO 1 UNITS: 100 INJECTION, SOLUTION INTRAVENOUS; SUBCUTANEOUS at 07:57

## 2022-09-05 RX ADMIN — AMLODIPINE BESYLATE 5 MG: 5 TABLET ORAL at 08:00

## 2022-09-05 RX ADMIN — BUDESONIDE AND FORMOTEROL FUMARATE DIHYDRATE 2 PUFF: 160; 4.5 AEROSOL RESPIRATORY (INHALATION) at 17:15

## 2022-09-05 RX ADMIN — METOPROLOL SUCCINATE 50 MG: 50 TABLET, EXTENDED RELEASE ORAL at 08:01

## 2022-09-05 RX ADMIN — BUMETANIDE 3 MG: 2 TABLET ORAL at 08:01

## 2022-09-05 RX ADMIN — BUMETANIDE 3 MG: 2 TABLET ORAL at 17:13

## 2022-09-05 RX ADMIN — INSULIN LISPRO 2 UNITS: 100 INJECTION, SOLUTION INTRAVENOUS; SUBCUTANEOUS at 16:41

## 2022-09-05 RX ADMIN — UMECLIDINIUM 1 PUFF: 62.5 AEROSOL, POWDER ORAL at 08:02

## 2022-09-05 RX ADMIN — INSULIN LISPRO 2 UNITS: 100 INJECTION, SOLUTION INTRAVENOUS; SUBCUTANEOUS at 21:31

## 2022-09-05 RX ADMIN — INSULIN GLARGINE 12 UNITS: 100 INJECTION, SOLUTION SUBCUTANEOUS at 21:32

## 2022-09-05 RX ADMIN — INSULIN LISPRO 2 UNITS: 100 INJECTION, SOLUTION INTRAVENOUS; SUBCUTANEOUS at 11:05

## 2022-09-05 RX ADMIN — MONTELUKAST 10 MG: 10 TABLET, FILM COATED ORAL at 08:01

## 2022-09-05 RX ADMIN — ATORVASTATIN CALCIUM 10 MG: 10 TABLET, FILM COATED ORAL at 08:00

## 2022-09-05 RX ADMIN — BUDESONIDE AND FORMOTEROL FUMARATE DIHYDRATE 2 PUFF: 160; 4.5 AEROSOL RESPIRATORY (INHALATION) at 08:02

## 2022-09-05 NOTE — CONSULTS
Consultation - Nephrology   Shannan Arora 56 y o  male MRN: [id number]  Unit/Bed#: [facility] 710-01 Encounter: [id number]    ASSESSMENT and PLAN:  1  Acute kidney injury on top of CKD, previous creatinine fluctuating around 1 5-1 7 back since 2021, suspected secondary to cardiorenal syndrome, hemodynamic changes  Creatinine up to 2 17 on 09/02  Kidney function improving and back to baseline with a creatinine of 1 66 today  Avoid relative hypotension  Patient currently is on oral diuretics  Discussed with patient about risk of chronic kidney disease and cardiac catheterization  Recommend to hold diuretics on the day of the cardiac catheterization  Consider gentle intravenously fluids pre and post as long as okay with heart failure  Follow daily labs  He needs outpatient follow-up with Nephrology after discharge  Avoid nephrotoxic    2  Chest pain, unclear etiology  Nuclear test shows mostly fixed defects in the basal to mid inferior and inferolateral walls, per Cardiology will need cardiac catheterization, time unknown, recommend holding diuretics on the day of the cardiac catheterization and consider gentle intravenously fluids pre and post as long as okay with Heart failure team    3  Chronic HFpEF, on exam he looks near euvolemic, continue with oral diuretics as per Heart failure team  Follow daily weight, monitor ins and outs, follow low-salt diet    4  Morbid obesity status post gastric bypass surgery    5  Hypertension, well controlled, avoid relative hypotension      SUMMARY OF RECOMMENDATIONS:  Continue current diuretics for heart failure team   Recommend to hold diuretics on the day of the cardiac catheterization  Consider gentle intravenously fluids pre and post as long as okay with Heart failure team  Monitor daily labs  Avoid relative hypotension  Follow low-salt diet  Follow daily weight    He will need outpatient follow-up with Nephrology at discharge    My plan and recommendations were discussed with Heart failure attending      HISTORY OF PRESENT ILLNESS:  Requesting Physician: Kristie Rico MD  Reason for Consult:   Acute kidney injury on top of CKD, need for cardiac catheterization    Lisa Carey is a 56 y o  male who was admitted to [facility] after presenting with chest pain and shortness of breath  A renal consultation is requested today for assistance in the management of acute kidney injury on top of CKD, need for cardiac catheterization  Patient with history hypertension, diabetes, morbid obesity status post gastric bypass surgery, chronic diastolic heart failure, hyperlipidemia, who was admitted to the hospital with chest pain and shortness of breath, noted his creatinine was previously elevated, patient had a nuclear stress test that showed mixed defects, cardiology planning cardiac catheterization for the reason Nephrology was consulted  During my evaluation patient is lying in bed, denies any chest pain but continues complaining of some dyspnea on exertion with ambulation, denies any abdominal pain, no recent nausea, no vomiting, no diarrhea, no constipation, denies urinary problems  Denies prior NSAID use    Denies family history of kidney disease    PAST MEDICAL HISTORY:  Past Medical History:   Diagnosis Date    Acute gastritis without hemorrhage     last assessed 3/24/17    Asthma     Bilateral leg edema 2/19/2020    CH[facility])     Daytime sleepiness 7/17/2019    Diabetes mellitus ([facility] [address] )     Dyspnea on exertion 10/11/2021    Edema of both feet 1/23/2020    Gastric bypass status for obesity     Hyperlipidemia     Hypertension     Hypokalemia 11/14/2021    Impaired fasting glucose     last assessed 5/10/17    Knee sprain, bilateral 6/14/2018    Leg cramps 6/16/2022    Obesity     Viral gastroenteritis     last assessed 11/4/16       PAST SURGICAL HISTORY:  Past Surgical History:   Procedure Laterality Date    CARDIAC CATHETERIZATION N/A 3/9/2022    Procedure: CARDIAC RHC W/ PRESSURE SENSOR;  Surgeon: Alan Bunn MD;  Location: [facility];   Service: Cardiology    CARPAL TUNNEL RELEASE      bilateral    GASTRIC BYPASS      with han en y   [id number]      ventral inscisional    TONSILLECTOMY         SOCIAL HISTORY:  Social History     Substance and Sexual Activity   Alcohol Use Not Currently    Alcohol/week: 2 0 standard drinks    Types: 2 Shots of liquor per week    Comment: social     Social History     Substance and Sexual Activity   Drug Use No     Social History     Tobacco Use   Smoking Status Former Smoker    Packs/day: 0 01    Years: 11 00    Pack years: 0 11    Types: Pipe, Cigars    Start date:     Quit date:     Years since quittin 6   Smokeless Tobacco Never Used   Tobacco Comment    cigar once a day       FAMILY HISTORY:  Family History   Problem Relation Age of Onset    Stroke Mother     Hypertension Father     Cancer Father     COPD Father        ALLERGIES:  Allergies   Allergen Reactions    Azithromycin Other (See Comments)     Shaky, uneasy feeling     Bactrim [Sulfamethoxazole-Trimethoprim] Other (See Comments)     shakiness       MEDICATIONS:    Current Facility-Administered Medications:     amLODIPine (NORVASC) tablet 5 mg, 5 mg, Oral, Daily, Josué Chang MD, 5 mg at 22 08    apixaban (ELIQUIS) tablet 5 mg, 5 mg, Oral, BID, Josué Chang MD, 5 mg at 22 08    atorvastatin (LIPITOR) tablet 10 mg, 10 mg, Oral, Daily, Josué Chang MD, 10 mg at 22 0800    benzonatate (TESSALON PERLES) capsule 100 mg, 100 mg, Oral, TID PRN, Josué Chang MD    budesonide-formoterol [facility]) 160-4 5 mcg/act inhaler 2 puff, 2 puff, Inhalation, BID, Josué Chang MD, 2 puff at 22 08    bumetanide (BUMEX) tablet 3 mg, 3 mg, Oral, BID, Keely Pisano MD, 3 mg at 22 08    insulin glargine (LANTUS) subcutaneous injection 12 Units 0 12 mL, 12 Units, Subcutaneous, HS, Eduardo Garcia MD, 12 Units at 09/04/22 2123    insulin lispro (HumaLOG) 100 units/mL subcutaneous injection 1-5 Units, 1-5 Units, Subcutaneous, HS, Francheska Baig MD, 2 Units at 09/04/22 2124    insulin lispro (HumaLOG) 100 units/mL subcutaneous injection 1-6 Units, 1-6 Units, Subcutaneous, TID AC, 2 Units at 09/05/22 1105 **AND** Fingerstick Glucose (POCT), , , TID AC, Francheska Baig MD    insulin lispro (HumaLOG) 100 units/mL subcutaneous injection 2 Units, 2 Units, Subcutaneous, TID With Meals, Eduardo Garcia MD, 2 Units at 09/05/22 1105    levalbuterol (XOPENEX) inhalation solution 1 25 mg, 1 25 mg, Nebulization, Q8H PRN, Reed Ellison DO    metoprolol succinate (TOPROL-XL) 24 hr tablet 50 mg, 50 mg, Oral, Daily, Francheska Baig MD, 50 mg at 09/05/22 0801    montelukast (SINGULAIR) tablet 10 mg, 10 mg, Oral, HS, Francheska Baig MD, 10 mg at 09/05/22 0801    potassium chloride (K-DUR,KLOR-CON) CR tablet 40 mEq, 40 mEq, Oral, BID, Francheska Baig MD, 40 mEq at 09/05/22 0800    [COMPLETED] predniSONE tablet 40 mg, 40 mg, Oral, Daily, 40 mg at 09/04/22 0817 **FOLLOWED BY** predniSONE tablet 30 mg, 30 mg, Oral, Daily, 30 mg at 09/05/22 0801 **FOLLOWED BY** [START ON 9/8/2022] predniSONE tablet 20 mg, 20 mg, Oral, Daily **FOLLOWED BY** [START ON 9/11/2022] predniSONE tablet 10 mg, 10 mg, Oral, Daily, Reed Ellison DO    sodium chloride 0 9 % inhalation solution 3 mL, 3 mL, Nebulization, Q8H PRN, Perla Patricia MD    umeclidinium bromide (INCRUSE ELLIPTA) 62 5 mcg/inh inhaler AEPB 1 puff, 1 puff, Inhalation, Daily, Francheska Baig MD, 1 puff at 09/05/22 0802    REVIEW OF SYSTEMS:  All the systems were reviewed and were negative except as documented on the HPI      PHYSICAL EXAM:  Current Weight: Weight - Scale: (!) 139 kg (306 lb)  First Weight: Weight - Scale: (!) 139 kg (306 lb)  Vitals:    09/04/22 0744 09/04/22 1633 09/04/22 2039 09/05/22 0727   BP: 123/75 135/81 136/81 139/82   Pulse: 75 68 65 80   Resp: 16      Temp: 97 8 °F (36 6 °C) 97 8 °F (36 6 °C) 97 9 °F (36 6 °C) 97 9 °F (36 6 °C)   TempSrc:       SpO2: 97% 94% 96% 96%   Weight:       Height:           Intake/Output Summary (Last 24 hours) at 9/5/2022 1425  Last data filed at 9/5/2022 0246  Gross per 24 hour   Intake --   Output 1800 ml   Net -1800 ml       General: conscious, cooperative, in not acute distress  Eyes: conjunctivae pink, anicteric sclerae  ENT: lips and mucous membranes moist, on room air  Neck: supple, no JVD  Chest: clear breath sounds bilateral, no crackles, ronchus or wheezings  CVS: distinct S1 & S2, normal rate, regular rhythm  Abdomen:  Obese, non-tender, non-distended, normoactive bowel sounds  Extremities: no significant edema of both legs  Skin: no rash  Neuro: awake, alert, oriented        Invasive Devices:        Lab Results:   Results from last 7 days   Lab Units 09/05/22  0524 09/03/22  0519 09/02/22  0641 09/01/22  1721   WBC Thousand/uL  --   --  6 96 12 39*   HEMOGLOBIN g/dL  --   --  12 6 12 3   HEMATOCRIT %  --   --  37 8 37 3   PLATELETS Thousands/uL  --   --  212 239   SODIUM mmol/L 135 135 130* 128*   POTASSIUM mmol/L 3 6 3 8 3 9 3 5   CHLORIDE mmol/L 102 99 94* 93*   CO2 mmol/L 27 31 27 21   BUN mg/dL 41* 39* 28* 20   CREATININE mg/dL 1 66* 2 06* 2 17* 1 91*   CALCIUM mg/dL 8 3 8 7 8 7 8 2*   ALK PHOS U/L  --   --   --  147*   ALT U/L  --   --   --  33   AST U/L  --   --   --  44       Other Studies:   Chest x-ray on 09/01, clear lungs, no effusions      Portions of the record may have been created with voice recognition software  Occasional wrong word or "sound a like" substitutions may have occurred due to the inherent limitations of voice recognition software  Read the chart carefully and recognize, using context, where substitutions have occurred  If you have any questions, please contact the dictating provider

## 2022-09-05 NOTE — PROGRESS NOTES
[facility]  Progress Note - David Petersen 1965, 56 y o  male MRN: [id number]  Unit/Bed#: [facility] 710-01 Encounter: [id number]  Primary Care Provider: Selina Elliott MD   Date and time admitted to hospital: 9/1/2022  5:08 PM    Chronic diastolic heart failure [facility])  Assessment & Plan  Wt Readings from Last 3 Encounters:   09/02/22 (!) 139 kg (306 lb)   08/17/22 (!) 142 kg (312 lb 6 4 oz)   08/08/22 (!) 142 kg (312 lb)     · Clinically appears euvolemic  · Weight is lower than last cardiology appointment on 08/17/2022  · Hyponatremia noted  · No signs of overload  · Continue home dose Bumex 3 mg BID  · Check cardiac BMP  · I&O and daily weight  · Cardiology consult  · Salt and fluid-restricted diet        * Chest pain and shortness of breath  Assessment & Plan  · Associated with shortness of breath  Mild wheezing on exam   · this has been ongoing intermittently for quite some time  · Unclear etiology  · rule out ACS -> Nuclear stress test done - has fixed defect, no ischemia but since he has not had LHC before, cardio recs LHC  · Does not appear in volume overload state  · Possible poorly controlled asthma  · ISATU score 1  · troponin normal  · EKG nonischemic  · Last echo showed EF of 55% with grade 1 diastolic dysfunction  · Cont prednisone for asthma at present  · PT OT rec cardiac rehab OP  · Since cardiac w/u so far negative, consulted pulmonary & at the same time cardiology placed recs for LHC  · Plan for LHC on tuesday, nephro consulted precath -> rec holding diuretic on day of LHC & consider IVF pre & post if ok with HF team  · Pulm - prednisone taper, cont triple inhaler, discuss other options outpatient    Chills and leukocytosis  Assessment & Plan  · Patient reports of having chills and increased clear phlegm production    · Chest x-ray does not seem to have focal consolidation  · Mild leukocytosis noted - now better  · Elevated procal (but has CKD)  · On rocephin -> stop that    Stage 3a chronic kidney disease [facility])  Assessment & Plan  Lab Results   Component Value Date    EGFR 45 09/05/2022    EGFR 35 09/03/2022    EGFR 33 09/02/2022    CREATININE 1 66 (H) 09/05/2022    CREATININE 2 06 (H) 09/03/2022    CREATININE 2 17 (H) 09/02/2022     · Baseline creatinine seems between 1 6-1 9  · Currently at baseline  · Continue home dose Bumex  · Trend BMP  · I&O and daily weight    Severe persistent asthma without complication  Assessment & Plan  · Worsening shortness of breath with increase clear phlegm production  · Last exacerbation requiring tapering PO Steroids in May 2022  · Follows up with Pulmonary outpatient  · Started on PO prednisone 40 mg daily  · Incentive spirometry respiratory protocol  · Continue home dose Singulair, Xopenex, Symbicort  · Incruse Ellipta daily  · Procalcitonin elevated, hence started on IV rocephin - now stopped  · Pulm consulted as no improvement in symptoms -> rec prednisone taper by 3 days, continue triple inhaler & discuss other options at OP appt    Hyponatremia  Assessment & Plan  · Unclear etiology  · Euvolemic hyponatremia, better  · Continue home dose Bumex  · Fluid restriction  · resolved    Type 2 diabetes mellitus, without long-term current use of insulin [facility])  Assessment & Plan  Lab Results   Component Value Date    HGBA1C 5 7 06/16/2022       Recent Labs     09/04/22  1630 09/04/22  2038 09/05/22  0638 09/05/22  1104   POCGLU 303* 246* 189* 211*       Blood Sugar Average: Last 72 hrs:  (P) 239 [id number]   Hold home dose sitagliptin while in hospital   Insulin sliding scale Accu-Cheks  Increase lantus to 12 U HS, add humalog 2 U TIDAC  Titrate insulin based on blood sugar level  Hypoglycemia protocol  Diabetic diet    Essential hypertension  Assessment & Plan  Continue home dose metoprolol, Bumex and amlodipine with holding parameters            VTE Pharmacologic Prophylaxis:   Moderate Risk (Score 3-4) - Pharmacological DVT Prophylaxis Ordered: apixaban (Eliquis)  Patient Centered Rounds: I performed bedside rounds with nursing staff today  Discussions with Specialists or Other Care Team Provider:     Education and Discussions with Family / Patient: patient  Time Spent for Care: 30 minutes  More than 50% of total time spent on counseling and coordination of care as described above  Current Length of Stay: 4 day(s)  Current Patient Status: Inpatient   Certification Statement: The patient will continue to require additional inpatient hospital stay due to [facility] tomorrow  Discharge Plan: Anticipate discharge in 24-48 hrs to home  Code Status: Level 1 - Full Code    Subjective:   Feels ok, hasnt ambulated yet today    Objective:     Vitals:   Temp (24hrs), Av °F (36 7 °C), Min:97 8 °F (36 6 °C), Max:98 1 °F (36 7 °C)    Temp:  [97 8 °F (36 6 °C)-98 1 °F (36 7 °C)] 98 1 °F (36 7 °C)  HR:  [65-80] 72  Resp:  [17] 17  BP: (128-139)/(80-82) 128/80  SpO2:  [94 %-96 %] 94 %  Body mass index is 41 5 kg/m²  Input and Output Summary (last 24 hours): Intake/Output Summary (Last 24 hours) at 2022 1605  Last data filed at 2022 0246  Gross per 24 hour   Intake --   Output 1800 ml   Net -1800 ml       Physical Exam:   Physical Exam  Vitals reviewed  HENT:      Head: Normocephalic and atraumatic  Mouth/Throat:      Mouth: Mucous membranes are moist    Eyes:      Conjunctiva/sclera: Conjunctivae normal    Cardiovascular:      Rate and Rhythm: Normal rate and regular rhythm  Heart sounds: Normal heart sounds  Pulmonary:      Effort: Pulmonary effort is normal  No respiratory distress  Breath sounds: Normal breath sounds  No wheezing  Abdominal:      General: There is no distension  Palpations: Abdomen is soft  Tenderness: There is no abdominal tenderness  Musculoskeletal:      Right lower leg: No edema  Left lower leg: No edema     Neurological:      Mental Status: He is alert and oriented to person, place, and time  Additional Data:     Labs:  Results from last 7 days   Lab Units 09/02/22  0641   WBC Thousand/uL 6 96   HEMOGLOBIN g/dL 12 6   HEMATOCRIT % 37 8   PLATELETS Thousands/uL 212   NEUTROS PCT % 94*   LYMPHS PCT % 3*   MONOS PCT % 2*   EOS PCT % 0     Results from last 7 days   Lab Units 09/05/22  0524 09/02/22  0641 09/01/22  1721   SODIUM mmol/L 135   < > 128*   POTASSIUM mmol/L 3 6   < > 3 5   CHLORIDE mmol/L 102   < > 93*   CO2 mmol/L 27   < > 21   BUN mg/dL 41*   < > 20   CREATININE mg/dL 1 66*   < > 1 91*   ANION GAP mmol/L 6   < > 14*   CALCIUM mg/dL 8 3   < > 8 2*   ALBUMIN g/dL  --   --  3 0*   TOTAL BILIRUBIN mg/dL  --   --  1 41*   ALK PHOS U/L  --   --  147*   ALT U/L  --   --  33   AST U/L  --   --  44   GLUCOSE RANDOM mg/dL 191*   < > 149*    < > = values in this interval not displayed  Results from last 7 days   Lab Units 09/05/22  1104 09/05/22  2643 09/04/22  2038 09/04/22  1630 09/04/22  1102 09/04/22  0714 09/03/22  2047 09/03/22  1622 09/03/22  1045 09/03/22  0639 09/02/22  2222 09/02/22  1740   POC GLUCOSE mg/dl 211* 189* 246* 303* 216* 194* 288* 248* 204* 206* 267* 299*         Results from last 7 days   Lab Units 09/02/22  0641 09/01/22  1942   PROCALCITONIN ng/ml 1 48* 1 74*       Lines/Drains:  Invasive Devices  Report    Peripheral Intravenous Line  Duration           Peripheral IV 09/02/22 Right;Ventral (anterior) Forearm 3 days                      Imaging: No pertinent imaging reviewed  Recent Cultures (last 7 days):   Results from last 7 days   Lab Units 09/01/22  1942   BLOOD CULTURE  No Growth at 72 hrs  No Growth at 72 hrs         Last 24 Hours Medication List:   Current Facility-Administered Medications   Medication Dose Route Frequency Provider Last Rate    amLODIPine  5 mg Oral Daily Louie Adames MD      apixaban  5 mg Oral BID Louie Adames MD      atorvastatin  10 mg Oral Daily MD Cj Townsend benzonatate  100 mg Oral TID PRN Alena Hackett MD      budesonide-formoterol  2 puff Inhalation BID Alena Hackett MD      bumetanide  3 mg Oral BID Slade Pop MD      insulin glargine  12 Units Subcutaneous HS King Shashank MD      insulin lispro  1-5 Units Subcutaneous HS Alena Hackett MD      insulin lispro  1-6 Units Subcutaneous TID [facility] Alena Hackett MD      insulin lispro  2 Units Subcutaneous TID With Meals King Shashank MD      levalbuterol  1 25 mg Nebulization Q8H PRN Barbra Campbell DO      metoprolol succinate  50 mg Oral Daily Alena Hackett MD      montelukast  10 mg Oral HS Alena Hackett MD      potassium chloride  40 mEq Oral BID Alena Hackett MD      predniSONE  30 mg Oral Daily Barbra Campbell DO      Followed by   Sylvia Harman ON 9/8/2022] predniSONE  20 mg Oral Daily Barbra Campbell DO      Followed by   Sylvia Harman ON 9/11/2022] predniSONE  10 mg Oral Daily Barbra Campbell DO      sodium chloride  3 mL Nebulization Q8H PRN King Shashank MD      umeclidinium bromide  1 puff Inhalation Daily Alena Hackett MD          Today, Patient Was Seen By: King Shashank MD    **Please Note: This note may have been constructed using a voice recognition system  **

## 2022-09-05 NOTE — ASSESSMENT & PLAN NOTE
Lab Results   Component Value Date    HGBA1C 5 7 06/16/2022       Recent Labs     09/04/22  1630 09/04/22 2038 09/05/22  0638 09/05/22  1104   POCGLU 303* 246* 189* 211*       Blood Sugar Average: Last 72 hrs:  (P) 239 [id number]   Hold home dose sitagliptin while in hospital   Insulin sliding scale Accu-Cheks  Increase lantus to 12 U HS, add humalog 2 U TIDAC  Titrate insulin based on blood sugar level    Hypoglycemia protocol  Diabetic diet

## 2022-09-05 NOTE — ASSESSMENT & PLAN NOTE
· Unclear etiology  · Euvolemic hyponatremia, better  · Continue home dose Bumex    · Fluid restriction  · resolved

## 2022-09-05 NOTE — PLAN OF CARE
Problem: Potential for Falls  Goal: Patient will remain free of falls  Description: INTERVENTIONS:  - Educate patient/family on patient safety including physical limitations  - Instruct patient to call for assistance with activity   - Consult OT/PT to assist with strengthening/mobility   - Keep Call bell within reach  - Keep bed low and locked with side rails adjusted as appropriate  - Keep care items and personal belongings within reach  - Initiate and maintain comfort rounds  - Make Fall Risk Sign visible to staff  - Offer Toileting every 2 Hours, in advance of need  - Initiate/Maintain bed alarm  - Apply yellow socks and bracelet for high fall risk patients  - Consider moving patient to room near nurses station  Outcome: Progressing     Problem: PAIN - ADULT  Goal: Verbalizes/displays adequate comfort level or baseline comfort level  Description: Interventions:  - Encourage patient to monitor pain and request assistance  - Assess pain using appropriate pain scale  - Administer analgesics based on type and severity of pain and evaluate response  - Implement non-pharmacological measures as appropriate and evaluate response  - Consider cultural and social influences on pain and pain management  - Notify physician/advanced practitioner if interventions unsuccessful or patient reports new pain  Outcome: Progressing     Problem: INFECTION - ADULT  Goal: Absence or prevention of progression during hospitalization  Description: INTERVENTIONS:  - Assess and monitor for signs and symptoms of infection  - Monitor lab/diagnostic results  - Monitor all insertion sites, i e  indwelling lines, tubes, and drains  - Monitor endotracheal if appropriate and nasal secretions for changes in amount and color  - [place] appropriate cooling/warming therapies per order  - Administer medications as ordered  - Instruct and encourage patient and family to use good hand hygiene technique  - Identify and instruct in appropriate isolation precautions for identified infection/condition  Outcome: Progressing  Goal: Absence of fever/infection during neutropenic period  Description: INTERVENTIONS:  - Monitor WBC    Outcome: Progressing     Problem: SAFETY ADULT  Goal: Patient will remain free of falls  Description: INTERVENTIONS:  - Educate patient/family on patient safety including physical limitations  - Instruct patient to call for assistance with activity   - Consult OT/PT to assist with strengthening/mobility   - Keep Call bell within reach  - Keep bed low and locked with side rails adjusted as appropriate  - Keep care items and personal belongings within reach  - Initiate and maintain comfort rounds  - Make Fall Risk Sign visible to staff  - Offer Toileting every 2 Hours, in advance of need  - Initiate/Maintain bed alarm  - Apply yellow socks and bracelet for high fall risk patients  - Consider moving patient to room near nurses station  Outcome: Progressing  Goal: Maintain or return to baseline ADL function  Description: INTERVENTIONS:  -  Assess patient's ability to carry out ADLs; assess patient's baseline for ADL function and identify physical deficits which impact ability to perform ADLs (bathing, care of mouth/teeth, toileting, grooming, dressing, etc )  - Assess/evaluate cause of self-care deficits   - Assess range of motion  - Assess patient's mobility; develop plan if impaired  - Assess patient's need for assistive devices and provide as appropriate  - Encourage maximum independence but intervene and supervise when necessary  - Involve family in performance of ADLs  - Assess for home care needs following discharge   - Consider OT consult to assist with ADL evaluation and planning for discharge  - Provide patient education as appropriate  Outcome: Progressing  Goal: Maintains/Returns to pre admission functional level  Description: INTERVENTIONS:  - Perform BMAT or MOVE assessment daily    - Set and communicate daily mobility goal to care team and patient/family/caregiver  - Collaborate with rehabilitation services on mobility goals if consulted  - Perform Range of Motion 2 times a day  - Reposition patient every 2 hours  - Dangle patient 2 times a day  - Stand patient 2 times a day  - Ambulate patient 2 times a day  - Out of bed to chair 2 times a day   - Out of bed for meals 2 times a day  - Out of bed for toileting  - Record patient progress and toleration of activity level   Outcome: Progressing     Problem: DISCHARGE PLANNING  Goal: Discharge to home or other facility with appropriate resources  Description: INTERVENTIONS:  - Identify barriers to discharge w/patient and caregiver  - Arrange for needed discharge resources and transportation as appropriate  - Identify discharge learning needs (meds, wound care, etc )  - Arrange for interpretive services to assist at discharge as needed  - Refer to Case Management Department for coordinating discharge planning if the patient needs post-hospital services based on physician/advanced practitioner order or complex needs related to functional status, cognitive ability, or social support system  Outcome: Progressing     Problem: Knowledge Deficit  Goal: Patient/family/caregiver demonstrates understanding of disease process, treatment plan, medications, and discharge instructions  Description: Complete learning assessment and assess knowledge base    Interventions:  - Provide teaching at level of understanding  - Provide teaching via preferred learning methods  Outcome: Progressing

## 2022-09-05 NOTE — ASSESSMENT & PLAN NOTE
Lab Results   Component Value Date    EGFR 45 09/05/2022    EGFR 35 09/03/2022    EGFR 33 09/02/2022    CREATININE 1 66 (H) 09/05/2022    CREATININE 2 06 (H) 09/03/2022    CREATININE 2 17 (H) 09/02/2022     · Baseline creatinine seems between 1 6-1 9  · Currently at baseline  · Continue home dose Bumex    · Trend BMP  · I&O and daily weight

## 2022-09-06 PROBLEM — N17.9 ACUTE RENAL FAILURE SUPERIMPOSED ON STAGE 3 CHRONIC KIDNEY DISEASE (HCC): Status: ACTIVE | Noted: 2021-12-23

## 2022-09-06 PROBLEM — J45.51 SEVERE PERSISTENT ASTHMA WITH ACUTE EXACERBATION: Status: ACTIVE | Noted: 2021-10-11

## 2022-09-06 LAB
ANION GAP SERPL CALCULATED.3IONS-SCNC: 7 MMOL/L (ref 4–13)
BACTERIA BLD CULT: NORMAL
BACTERIA BLD CULT: NORMAL
BUN SERPL-MCNC: 41 MG/DL (ref 5–25)
CALCIUM SERPL-MCNC: 8.9 MG/DL (ref 8.3–10.1)
CHEST PAIN STATEMENT: NORMAL
CHLORIDE SERPL-SCNC: 101 MMOL/L (ref 96–108)
CHOLEST SERPL-MCNC: 163 MG/DL
CO2 SERPL-SCNC: 26 MMOL/L (ref 21–32)
CREAT SERPL-MCNC: 1.52 MG/DL (ref 0.6–1.3)
GFR SERPL CREATININE-BSD FRML MDRD: 50 ML/MIN/1.73SQ M
GLUCOSE SERPL-MCNC: 159 MG/DL (ref 65–140)
GLUCOSE SERPL-MCNC: 244 MG/DL (ref 65–140)
GLUCOSE SERPL-MCNC: 266 MG/DL (ref 65–140)
GLUCOSE SERPL-MCNC: 296 MG/DL (ref 65–140)
GLUCOSE SERPL-MCNC: 301 MG/DL (ref 65–140)
HDLC SERPL-MCNC: 45 MG/DL
LDLC SERPL CALC-MCNC: 90 MG/DL (ref 0–100)
LDLC SERPL DIRECT ASSAY-MCNC: 97 MG/DL (ref 0–100)
MAX DIASTOLIC BP: 65 MMHG
MAX HEART RATE: 117 BPM
MAX PREDICTED HEART RATE: 165 BPM
MAX. SYSTOLIC BP: 141 MMHG
NONHDLC SERPL-MCNC: 118 MG/DL
POTASSIUM SERPL-SCNC: 4.5 MMOL/L (ref 3.5–5.3)
PROTOCOL NAME: NORMAL
REASON FOR TERMINATION: NORMAL
SODIUM SERPL-SCNC: 134 MMOL/L (ref 135–147)
TARGET HR FORMULA: NORMAL
TEST INDICATION: NORMAL
TIME IN EXERCISE PHASE: NORMAL
TRIGL SERPL-MCNC: 138 MG/DL

## 2022-09-06 PROCEDURE — C1769 GUIDE WIRE: HCPCS | Performed by: INTERNAL MEDICINE

## 2022-09-06 PROCEDURE — 99152 MOD SED SAME PHYS/QHP 5/>YRS: CPT | Performed by: INTERNAL MEDICINE

## 2022-09-06 PROCEDURE — 86037 ANCA TITER EACH ANTIBODY: CPT | Performed by: INTERNAL MEDICINE

## 2022-09-06 PROCEDURE — B2111ZZ FLUOROSCOPY OF MULTIPLE CORONARY ARTERIES USING LOW OSMOLAR CONTRAST: ICD-10-PCS | Performed by: INTERNAL MEDICINE

## 2022-09-06 PROCEDURE — 86430 RHEUMATOID FACTOR TEST QUAL: CPT | Performed by: INTERNAL MEDICINE

## 2022-09-06 PROCEDURE — 99232 SBSQ HOSP IP/OBS MODERATE 35: CPT | Performed by: STUDENT IN AN ORGANIZED HEALTH CARE EDUCATION/TRAINING PROGRAM

## 2022-09-06 PROCEDURE — 93458 L HRT ARTERY/VENTRICLE ANGIO: CPT | Performed by: INTERNAL MEDICINE

## 2022-09-06 PROCEDURE — 86225 DNA ANTIBODY NATIVE: CPT | Performed by: INTERNAL MEDICINE

## 2022-09-06 PROCEDURE — 82948 REAGENT STRIP/BLOOD GLUCOSE: CPT

## 2022-09-06 PROCEDURE — 99153 MOD SED SAME PHYS/QHP EA: CPT | Performed by: INTERNAL MEDICINE

## 2022-09-06 PROCEDURE — 4A023N7 MEASUREMENT OF CARDIAC SAMPLING AND PRESSURE, LEFT HEART, PERCUTANEOUS APPROACH: ICD-10-PCS | Performed by: INTERNAL MEDICINE

## 2022-09-06 PROCEDURE — 86200 CCP ANTIBODY: CPT | Performed by: INTERNAL MEDICINE

## 2022-09-06 PROCEDURE — NC001 PR NO CHARGE: Performed by: STUDENT IN AN ORGANIZED HEALTH CARE EDUCATION/TRAINING PROGRAM

## 2022-09-06 PROCEDURE — 99233 SBSQ HOSP IP/OBS HIGH 50: CPT | Performed by: INTERNAL MEDICINE

## 2022-09-06 PROCEDURE — 83520 IMMUNOASSAY QUANT NOS NONAB: CPT | Performed by: INTERNAL MEDICINE

## 2022-09-06 PROCEDURE — C1894 INTRO/SHEATH, NON-LASER: HCPCS | Performed by: INTERNAL MEDICINE

## 2022-09-06 PROCEDURE — 80048 BASIC METABOLIC PNL TOTAL CA: CPT | Performed by: STUDENT IN AN ORGANIZED HEALTH CARE EDUCATION/TRAINING PROGRAM

## 2022-09-06 PROCEDURE — 93005 ELECTROCARDIOGRAM TRACING: CPT

## 2022-09-06 PROCEDURE — 86038 ANTINUCLEAR ANTIBODIES: CPT | Performed by: INTERNAL MEDICINE

## 2022-09-06 PROCEDURE — 99232 SBSQ HOSP IP/OBS MODERATE 35: CPT | Performed by: INTERNAL MEDICINE

## 2022-09-06 RX ORDER — HEPARIN SODIUM 1000 [USP'U]/ML
INJECTION, SOLUTION INTRAVENOUS; SUBCUTANEOUS AS NEEDED
Status: DISCONTINUED | OUTPATIENT
Start: 2022-09-06 | End: 2022-09-06 | Stop reason: HOSPADM

## 2022-09-06 RX ORDER — INSULIN GLARGINE 100 [IU]/ML
15 INJECTION, SOLUTION SUBCUTANEOUS
Status: DISCONTINUED | OUTPATIENT
Start: 2022-09-06 | End: 2022-09-07 | Stop reason: HOSPADM

## 2022-09-06 RX ORDER — NITROGLYCERIN 20 MG/100ML
INJECTION INTRAVENOUS AS NEEDED
Status: DISCONTINUED | OUTPATIENT
Start: 2022-09-06 | End: 2022-09-06 | Stop reason: HOSPADM

## 2022-09-06 RX ORDER — INSULIN LISPRO 100 [IU]/ML
5 INJECTION, SOLUTION INTRAVENOUS; SUBCUTANEOUS
Status: DISCONTINUED | OUTPATIENT
Start: 2022-09-06 | End: 2022-09-07 | Stop reason: HOSPADM

## 2022-09-06 RX ORDER — LIDOCAINE HYDROCHLORIDE 10 MG/ML
INJECTION, SOLUTION EPIDURAL; INFILTRATION; INTRACAUDAL; PERINEURAL AS NEEDED
Status: DISCONTINUED | OUTPATIENT
Start: 2022-09-06 | End: 2022-09-06 | Stop reason: HOSPADM

## 2022-09-06 RX ORDER — ASPIRIN 81 MG/1
TABLET, CHEWABLE ORAL AS NEEDED
Status: DISCONTINUED | OUTPATIENT
Start: 2022-09-06 | End: 2022-09-06 | Stop reason: HOSPADM

## 2022-09-06 RX ORDER — ONDANSETRON 2 MG/ML
4 INJECTION INTRAMUSCULAR; INTRAVENOUS EVERY 6 HOURS PRN
Status: DISCONTINUED | OUTPATIENT
Start: 2022-09-06 | End: 2022-09-07 | Stop reason: HOSPADM

## 2022-09-06 RX ORDER — AMLODIPINE BESYLATE 5 MG/1
5 TABLET ORAL EVERY 12 HOURS SCHEDULED
Status: DISCONTINUED | OUTPATIENT
Start: 2022-09-06 | End: 2022-09-07 | Stop reason: HOSPADM

## 2022-09-06 RX ORDER — SODIUM CHLORIDE 9 MG/ML
75 INJECTION, SOLUTION INTRAVENOUS CONTINUOUS
Status: DISPENSED | OUTPATIENT
Start: 2022-09-06 | End: 2022-09-06

## 2022-09-06 RX ORDER — MIDAZOLAM HYDROCHLORIDE 2 MG/2ML
INJECTION, SOLUTION INTRAMUSCULAR; INTRAVENOUS AS NEEDED
Status: DISCONTINUED | OUTPATIENT
Start: 2022-09-06 | End: 2022-09-06 | Stop reason: HOSPADM

## 2022-09-06 RX ORDER — FENTANYL CITRATE 50 UG/ML
INJECTION, SOLUTION INTRAMUSCULAR; INTRAVENOUS AS NEEDED
Status: DISCONTINUED | OUTPATIENT
Start: 2022-09-06 | End: 2022-09-06 | Stop reason: HOSPADM

## 2022-09-06 RX ORDER — ACETAMINOPHEN 325 MG/1
650 TABLET ORAL EVERY 4 HOURS PRN
Status: DISCONTINUED | OUTPATIENT
Start: 2022-09-06 | End: 2022-09-07 | Stop reason: HOSPADM

## 2022-09-06 RX ORDER — VERAPAMIL HCL 2.5 MG/ML
AMPUL (ML) INTRAVENOUS AS NEEDED
Status: DISCONTINUED | OUTPATIENT
Start: 2022-09-06 | End: 2022-09-06 | Stop reason: HOSPADM

## 2022-09-06 RX ADMIN — POTASSIUM CHLORIDE 40 MEQ: 1500 TABLET, EXTENDED RELEASE ORAL at 08:28

## 2022-09-06 RX ADMIN — APIXABAN 5 MG: 5 TABLET, FILM COATED ORAL at 12:40

## 2022-09-06 RX ADMIN — INSULIN LISPRO 3 UNITS: 100 INJECTION, SOLUTION INTRAVENOUS; SUBCUTANEOUS at 21:21

## 2022-09-06 RX ADMIN — PREDNISONE 30 MG: 20 TABLET ORAL at 08:29

## 2022-09-06 RX ADMIN — INSULIN LISPRO 5 UNITS: 100 INJECTION, SOLUTION INTRAVENOUS; SUBCUTANEOUS at 16:38

## 2022-09-06 RX ADMIN — BUDESONIDE AND FORMOTEROL FUMARATE DIHYDRATE 2 PUFF: 160; 4.5 AEROSOL RESPIRATORY (INHALATION) at 08:30

## 2022-09-06 RX ADMIN — MONTELUKAST 10 MG: 10 TABLET, FILM COATED ORAL at 08:29

## 2022-09-06 RX ADMIN — INSULIN LISPRO 3 UNITS: 100 INJECTION, SOLUTION INTRAVENOUS; SUBCUTANEOUS at 13:08

## 2022-09-06 RX ADMIN — AMLODIPINE BESYLATE 5 MG: 5 TABLET ORAL at 08:29

## 2022-09-06 RX ADMIN — INSULIN LISPRO 5 UNITS: 100 INJECTION, SOLUTION INTRAVENOUS; SUBCUTANEOUS at 13:08

## 2022-09-06 RX ADMIN — POTASSIUM CHLORIDE 40 MEQ: 1500 TABLET, EXTENDED RELEASE ORAL at 17:14

## 2022-09-06 RX ADMIN — METOPROLOL SUCCINATE 50 MG: 50 TABLET, EXTENDED RELEASE ORAL at 08:29

## 2022-09-06 RX ADMIN — APIXABAN 5 MG: 5 TABLET, FILM COATED ORAL at 17:14

## 2022-09-06 RX ADMIN — UMECLIDINIUM 1 PUFF: 62.5 AEROSOL, POWDER ORAL at 08:30

## 2022-09-06 RX ADMIN — BUDESONIDE AND FORMOTEROL FUMARATE DIHYDRATE 2 PUFF: 160; 4.5 AEROSOL RESPIRATORY (INHALATION) at 17:14

## 2022-09-06 RX ADMIN — SODIUM CHLORIDE 75 ML/HR: 0.9 INJECTION, SOLUTION INTRAVENOUS at 12:38

## 2022-09-06 RX ADMIN — ATORVASTATIN CALCIUM 10 MG: 10 TABLET, FILM COATED ORAL at 08:29

## 2022-09-06 RX ADMIN — INSULIN LISPRO 4 UNITS: 100 INJECTION, SOLUTION INTRAVENOUS; SUBCUTANEOUS at 16:38

## 2022-09-06 RX ADMIN — AMLODIPINE BESYLATE 5 MG: 5 TABLET ORAL at 20:05

## 2022-09-06 RX ADMIN — INSULIN GLARGINE 15 UNITS: 100 INJECTION, SOLUTION SUBCUTANEOUS at 21:21

## 2022-09-06 NOTE — PLAN OF CARE
Problem: Potential for Falls  Goal: Patient will remain free of falls  Description: INTERVENTIONS:  - Educate patient/family on patient safety including physical limitations  - Instruct patient to call for assistance with activity   - Consult OT/PT to assist with strengthening/mobility   - Keep Call bell within reach  - Keep bed low and locked with side rails adjusted as appropriate  - Keep care items and personal belongings within reach  - Initiate and maintain comfort rounds  - Make Fall Risk Sign visible to staff  - Offer Toileting every 2 Hours, in advance of need  - Initiate/Maintain bed alarm  - Apply yellow socks and bracelet for high fall risk patients  - Consider moving patient to room near nurses station  Outcome: Progressing     Problem: PAIN - ADULT  Goal: Verbalizes/displays adequate comfort level or baseline comfort level  Description: Interventions:  - Encourage patient to monitor pain and request assistance  - Assess pain using appropriate pain scale  - Administer analgesics based on type and severity of pain and evaluate response  - Implement non-pharmacological measures as appropriate and evaluate response  - Consider cultural and social influences on pain and pain management  - Notify physician/advanced practitioner if interventions unsuccessful or patient reports new pain  Outcome: Progressing     Problem: INFECTION - ADULT  Goal: Absence or prevention of progression during hospitalization  Description: INTERVENTIONS:  - Assess and monitor for signs and symptoms of infection  - Monitor lab/diagnostic results  - Monitor all insertion sites, i e  indwelling lines, tubes, and drains  - Monitor endotracheal if appropriate and nasal secretions for changes in amount and color  - [place] appropriate cooling/warming therapies per order  - Administer medications as ordered  - Instruct and encourage patient and family to use good hand hygiene technique  - Identify and instruct in appropriate isolation precautions for identified infection/condition  Outcome: Progressing  Goal: Absence of fever/infection during neutropenic period  Description: INTERVENTIONS:  - Monitor WBC    Outcome: Progressing     Problem: SAFETY ADULT  Goal: Patient will remain free of falls  Description: INTERVENTIONS:  - Educate patient/family on patient safety including physical limitations  - Instruct patient to call for assistance with activity   - Consult OT/PT to assist with strengthening/mobility   - Keep Call bell within reach  - Keep bed low and locked with side rails adjusted as appropriate  - Keep care items and personal belongings within reach  - Initiate and maintain comfort rounds  - Make Fall Risk Sign visible to staff  - Offer Toileting every 2 Hours, in advance of need  - Initiate/Maintain bed alarm  - Apply yellow socks and bracelet for high fall risk patients  - Consider moving patient to room near nurses station  Outcome: Progressing  Goal: Maintain or return to baseline ADL function  Description: INTERVENTIONS:  -  Assess patient's ability to carry out ADLs; assess patient's baseline for ADL function and identify physical deficits which impact ability to perform ADLs (bathing, care of mouth/teeth, toileting, grooming, dressing, etc )  - Assess/evaluate cause of self-care deficits   - Assess range of motion  - Assess patient's mobility; develop plan if impaired  - Assess patient's need for assistive devices and provide as appropriate  - Encourage maximum independence but intervene and supervise when necessary  - Involve family in performance of ADLs  - Assess for home care needs following discharge   - Consider OT consult to assist with ADL evaluation and planning for discharge  - Provide patient education as appropriate  Outcome: Progressing  Goal: Maintains/Returns to pre admission functional level  Description: INTERVENTIONS:  - Perform BMAT or MOVE assessment daily    - Set and communicate daily mobility goal to care team and patient/family/caregiver  - Collaborate with rehabilitation services on mobility goals if consulted  - Perform Range of Motion 2 times a day  - Reposition patient every 2 hours  - Dangle patient 2 times a day  - Stand patient 2 times a day  - Ambulate patient 2 times a day  - Out of bed to chair 2 times a day   - Out of bed for meals 2 times a day  - Out of bed for toileting  - Record patient progress and toleration of activity level   Outcome: Progressing     Problem: DISCHARGE PLANNING  Goal: Discharge to home or other facility with appropriate resources  Description: INTERVENTIONS:  - Identify barriers to discharge w/patient and caregiver  - Arrange for needed discharge resources and transportation as appropriate  - Identify discharge learning needs (meds, wound care, etc )  - Arrange for interpretive services to assist at discharge as needed  - Refer to Case Management Department for coordinating discharge planning if the patient needs post-hospital services based on physician/advanced practitioner order or complex needs related to functional status, cognitive ability, or social support system  Outcome: Progressing     Problem: Knowledge Deficit  Goal: Patient/family/caregiver demonstrates understanding of disease process, treatment plan, medications, and discharge instructions  Description: Complete learning assessment and assess knowledge base    Interventions:  - Provide teaching at level of understanding  - Provide teaching via preferred learning methods  Outcome: Progressing

## 2022-09-06 NOTE — UTILIZATION REVIEW
Continued Stay Review    Date: 5- 6-22                          Current Patient Class:  Inpatient Current Level of Care: med surg    HPI:57 y o  male initially admitted on 9-1-22      Assessment/Plan:     Left cardiac cath today  Continue to taper prednisone  Continue iv  9% ns 75/hr  Follow labs  09/06/22 1407  Anti-DNA antibody, double-stranded  Once        Status: In process      09/06/22 1406  ARACELI Screen w/ Reflex to Titer/Pattern  Once        Status: In process      09/06/22 6653  Anti-neutrophilic cytoplasmic antibody  Once        Status: In process      09/06/22 1406  RF Screen w/ Reflex to Titer  Once        Status: In process      76/07/84 7363  Cyclic citrul peptide antibody, IgG  Once        Status:  In process              Vital Signs:       Date/  Time Temp Pulse Resp BP MAP SpO2 Nasal Cannula O2 Flow Rate (L/min) O2 Device   09/06/2 15:51:02 97 3 °F (36 3°C)   Abnormal  69 -- 136/82 100 98 % -- --   09/06/2211:20:04 -- -- -- -- -- 99 % 2 L/min Nasal cannula   09/06/2207:40:06 97 8 °F (36 6 °C) 65 15 155/87 110 98 % -- --           Pertinent Labs/Diagnostic Results:     Results from last 7 days   Lab Units 09/01/22  1721   SARS-COV-2  Negative     Results from last 7 days   Lab Units 09/02/22  0641 09/01/22  1721   WBC Thousand/uL 6 96 12 39*   HEMOGLOBIN g/dL 12 6 12 3   HEMATOCRIT % 37 8 37 3   PLATELETS Thousands/uL 212 239   NEUTROS ABS Thousands/µL 6 48 10 85*         Results from last 7 days   Lab Units 09/06/22  1116 09/05/22  0524 09/03/22  0519 09/02/22  0641 09/01/22  1721   SODIUM mmol/L 134* 135 135 130* 128*   POTASSIUM mmol/L 4 5 3 6 3 8 3 9 3 5   CHLORIDE mmol/L 101 102 99 94* 93*   CO2 mmol/L 26 27 31 27 21   ANION GAP mmol/L 7 6 5 9 14*   BUN mg/dL 41* 41* 39* 28* 20   CREATININE mg/dL 1 52* 1 66* 2 06* 2 17* 1 91*   EGFR ml/min/1 73sq m 50 45 35 33 38   CALCIUM mg/dL 8 9 8 3 8 7 8 7 8 2*     Results from last 7 days   Lab Units 09/01/22  1721   AST U/L 44   ALT U/L 33   ALK PHOS U/L 147*   TOTAL PROTEIN g/dL 7 1   ALBUMIN g/dL 3 0*   TOTAL BILIRUBIN mg/dL 1 41*     Results from last 7 days   Lab Units 09/06/22  1550 09/06/22  1158 09/06/22  0611 09/05/22  2102 09/05/22  1610 09/05/22  1104 09/05/22  1930 09/04/22  2038 09/04/22  1630 09/04/22  1102 09/04/22  0714 09/03/22  2047   POC GLUCOSE mg/dl 296* 266* 159* 234* 325* 211* 189* 246* 303* 216* 194* 288*     Results from last 7 days   Lab Units 09/06/22  1116 09/05/22  0524 09/03/22  0519 09/02/22  0641 09/01/22  1721   GLUCOSE RANDOM mg/dL 244* 191* 217* 217* 149*     Results from last 7 days   Lab Units 09/01/22  1942   OSMOLALITY, SERUM mmol/       Results from last 7 days   Lab Units 09/01/22 2118 09/01/22  1942 09/01/22  1721   HS TNI 0HR ng/L  --   --  22   HS TNI 2HR ng/L 20  --   --    HSTNI D2 ng/L -2  --   --    HS TNI 4HR ng/L  --  21  --    HSTNI D4 ng/L  --  -1  --              Results from last 7 days   Lab Units 09/01/22  1721   TSH 3RD GENERATON uIU/mL 1 270     Results from last 7 days   Lab Units 09/02/22  0641 09/01/22  1942   PROCALCITONIN ng/ml 1 48* 1 74*       Results from last 7 days   Lab Units 09/01/22  1942   NT-PRO BNP pg/mL 2,545*       Results from last 7 days   Lab Units 09/02/22  0023 09/01/22  1942   OSMOLALITY, SERUM mmol/KG  --  296   OSMO UR mmol/  --      Results from last 7 days   Lab Units 09/02/22  0023   SODIUM UR  22     Results from last 7 days   Lab Units 09/01/22  1721   INFLUENZA A PCR  Negative   INFLUENZA B PCR  Negative   RSV PCR  Negative       Results from last 7 days   Lab Units 09/01/22  1942   BLOOD CULTURE  No Growth After 4 Days  No Growth After 4 Days               Scheduled Medications:  amLODIPine, 5 mg, Oral, Q12H ANA  apixaban, 5 mg, Oral, BID  atorvastatin, 10 mg, Oral, Daily  budesonide-formoterol, 2 puff, Inhalation, BID  insulin glargine, 15 Units, Subcutaneous, HS  insulin lispro, 1-5 Units, Subcutaneous, HS  insulin lispro, 1-6 Units, Subcutaneous, TID AC  insulin lispro, 5 Units, Subcutaneous, TID With Meals  metoprolol succinate, 50 mg, Oral, Daily  montelukast, 10 mg, Oral, HS  potassium chloride, 40 mEq, Oral, BID  predniSONE, 30 mg, Oral, Daily   Followed by  Russell Talavera ON 9/8/2022] predniSONE, 20 mg, Oral, Daily   Followed by  Russell Talavera ON 9/11/2022] predniSONE, 10 mg, Oral, Daily  umeclidinium bromide, 1 puff, Inhalation, Daily      Continuous IV Infusions:     PRN Meds:  acetaminophen, 650 mg, Oral, Q4H PRN  benzonatate, 100 mg, Oral, TID PRN  levalbuterol, 1 25 mg, Nebulization, Q8H PRN  ondansetron, 4 mg, Intravenous, Q6H PRN  sodium chloride, 3 mL, Nebulization, Q8H PRN        Discharge Plan: to be determrined       Network Utilization Review Department  ATTENTION: Please call with any questions or concerns to [phone] and carefully listen to the prompts so that you are directed to the right person  All voicemails are confidential   Jamari Marrero all requests for admission clinical reviews, approved or denied determinations and any other requests to dedicated fax number below belonging to the campus where the patient is receiving treatment   List of dedicated fax numbers for the Facilities:  [address] DENIALS (Administrative/Medical Necessity) [phone]   [address] (Maternity/NICU/Pediatrics) [phone] [address]  [phone]   Roxi Edwards [address] [address] [place] [address] [address] [place] [address] [facility] [place] [phone]

## 2022-09-06 NOTE — ASSESSMENT & PLAN NOTE
· Worsening shortness of breath with increase clear phlegm production  · Last exacerbation requiring tapering PO Steroids in May 2022  · Follows up with Pulmonary outpatient  · Started on PO prednisone 40 mg daily  · Incentive spirometry respiratory protocol    · Continue home dose Singulair, Xopenex, Symbicort  · Incruse Ellipta daily  · Procalcitonin elevated, hence started on IV rocephin - now stopped  · Pulm consulted as no improvement in symptoms -> rec prednisone taper by 3 days, continue triple inhaler & discuss other options at OP appt  · Wheezing has resolved

## 2022-09-06 NOTE — CASE MANAGEMENT
Case Management Discharge Planning Note    Patient name Dagmar Jeronimo  [id number]  : 1965 Date 2022       Current Admission Date: 2022  Current Admission Diagnosis:Chest pain and shortness of breath   Patient Active Problem List    Diagnosis Date Noted    Chest pain and shortness of breath 2022    Chills and leukocytosis 2022    Paroxysmal atrial fibrillation ([facility] [address] ) 2022    Acute renal failure superimposed on stage 3 chronic kidney disease ([address] ) 2021    Chronic diastolic heart failure ([facility] [facility] ) 2021    Severe persistent asthma with acute exacerbation 10/11/2021    Hyponatremia 2021    Cardiomyopathy ([facility] [address] [facility] ) 2021    HNP (herniated nucleus pulposus), lumbar 2021    Foraminal stenosis of lumbar region 2021    VICKY (obstructive sleep apnea)     Mixed hyperlipidemia 2019    Primary osteoarthritis of both knees 2018    Irritable bowel syndrome with diarrhea 2018    Essential hypertension 2018    Type 2 diabetes mellitus, without long-term current use of insulin ([facility] [facility] ) 2018    Vitamin B12 deficiency 2016    Vitamin D deficiency 2016    Morbid obesity due to excess calories ([facility] [facility] ) 2016    Primary osteoarthritis of right knee 10/15/2013      LOS (days): 5  Geometric Mean LOS (GMLOS) (days): 3 00  Days to GMLOS:-1 8     OBJECTIVE:  Risk of Unplanned Readmission Score: 28 33         Current admission status: Inpatient   Preferred Pharmacy:   [address] [facility] [address]  Phone: [phone] Fax: [phone]    [id number] [address] [facility]  [address]  [address] [place] [address]  Phone: [phone] Fax: [phone]    Primary Care Provider: Antonella Skelton MD    Primary Insurance: [address] [place] [address]  Secondary Insurance: DISCHARGE DETAILS:    Discharge planning discussed with[de-identified] pt  Freedom of Choice: Yes  Comments - Freedom of Choice: cm discussed hhc rec  CM contacted family/caregiver?: No- see comments  Were Treatment Team discharge recommendations reviewed with patient/caregiver?: Yes  Did patient/caregiver verbalize understanding of patient care needs?: Yes  Were patient/caregiver advised of the risks associated with not following Treatment Team discharge recommendations?: Yes    Contacts  Reason/Outcome: Discharge Planning      Treatment Team Recommendation: Home with [address] [place]  Discharge Destination Plan[de-identified[facility] [employer] [facility]      [facility], referrals placed

## 2022-09-06 NOTE — PLAN OF CARE
Problem: Potential for Falls  Goal: Patient will remain free of falls  Description: INTERVENTIONS:  - Educate patient/family on patient safety including physical limitations  - Instruct patient to call for assistance with activity   - Consult OT/PT to assist with strengthening/mobility   - Keep Call bell within reach  - Keep bed low and locked with side rails adjusted as appropriate  - Keep care items and personal belongings within reach  - Initiate and maintain comfort rounds  - Make Fall Risk Sign visible to staff  - Offer Toileting every 2 Hours, in advance of need  - Initiate/Maintain bed alarm  - Apply yellow socks and bracelet for high fall risk patients  - Consider moving patient to room near nurses station  Outcome: Progressing     Problem: PAIN - ADULT  Goal: Verbalizes/displays adequate comfort level or baseline comfort level  Description: Interventions:  - Encourage patient to monitor pain and request assistance  - Assess pain using appropriate pain scale  - Administer analgesics based on type and severity of pain and evaluate response  - Implement non-pharmacological measures as appropriate and evaluate response  - Consider cultural and social influences on pain and pain management  - Notify physician/advanced practitioner if interventions unsuccessful or patient reports new pain  Outcome: Progressing     Problem: INFECTION - ADULT  Goal: Absence or prevention of progression during hospitalization  Description: INTERVENTIONS:  - Assess and monitor for signs and symptoms of infection  - Monitor lab/diagnostic results  - Monitor all insertion sites, i e  indwelling lines, tubes, and drains  - Monitor endotracheal if appropriate and nasal secretions for changes in amount and color  - [place] appropriate cooling/warming therapies per order  - Administer medications as ordered  - Instruct and encourage patient and family to use good hand hygiene technique  - Identify and instruct in appropriate isolation precautions for identified infection/condition  Outcome: Progressing  Goal: Absence of fever/infection during neutropenic period  Description: INTERVENTIONS:  - Monitor WBC    Outcome: Progressing     Problem: SAFETY ADULT  Goal: Patient will remain free of falls  Description: INTERVENTIONS:  - Educate patient/family on patient safety including physical limitations  - Instruct patient to call for assistance with activity   - Consult OT/PT to assist with strengthening/mobility   - Keep Call bell within reach  - Keep bed low and locked with side rails adjusted as appropriate  - Keep care items and personal belongings within reach  - Initiate and maintain comfort rounds  - Make Fall Risk Sign visible to staff  - Offer Toileting every 2 Hours, in advance of need  - Initiate/Maintain bed alarm  - Apply yellow socks and bracelet for high fall risk patients  - Consider moving patient to room near nurses station  Outcome: Progressing  Goal: Maintain or return to baseline ADL function  Description: INTERVENTIONS:  -  Assess patient's ability to carry out ADLs; assess patient's baseline for ADL function and identify physical deficits which impact ability to perform ADLs (bathing, care of mouth/teeth, toileting, grooming, dressing, etc )  - Assess/evaluate cause of self-care deficits   - Assess range of motion  - Assess patient's mobility; develop plan if impaired  - Assess patient's need for assistive devices and provide as appropriate  - Encourage maximum independence but intervene and supervise when necessary  - Involve family in performance of ADLs  - Assess for home care needs following discharge   - Consider OT consult to assist with ADL evaluation and planning for discharge  - Provide patient education as appropriate  Outcome: Progressing  Goal: Maintains/Returns to pre admission functional level  Description: INTERVENTIONS:  - Perform BMAT or MOVE assessment daily    - Set and communicate daily mobility goal to care team and patient/family/caregiver  - Collaborate with rehabilitation services on mobility goals if consulted  - Perform Range of Motion 3 times a day  - Reposition patient every 2 hours  - Dangle patient 3 times a day  - Stand patient 3 times a day  - Ambulate patient 3 times a day  - Out of bed to chair 3 times a day   - Out of bed for meals 3 times a day  - Out of bed for toileting  - Record patient progress and toleration of activity level   Outcome: Progressing     Problem: DISCHARGE PLANNING  Goal: Discharge to home or other facility with appropriate resources  Description: INTERVENTIONS:  - Identify barriers to discharge w/patient and caregiver  - Arrange for needed discharge resources and transportation as appropriate  - Identify discharge learning needs (meds, wound care, etc )  - Arrange for interpretive services to assist at discharge as needed  - Refer to Case Management Department for coordinating discharge planning if the patient needs post-hospital services based on physician/advanced practitioner order or complex needs related to functional status, cognitive ability, or social support system  Outcome: Progressing     Problem: Knowledge Deficit  Goal: Patient/family/caregiver demonstrates understanding of disease process, treatment plan, medications, and discharge instructions  Description: Complete learning assessment and assess knowledge base    Interventions:  - Provide teaching at level of understanding  - Provide teaching via preferred learning methods  Outcome: Progressing

## 2022-09-06 NOTE — ASSESSMENT & PLAN NOTE
· Associated with shortness of breath  Mild wheezing on exam   · this has been ongoing intermittently for quite some time  · Unclear etiology  · rule out ACS -> Nuclear stress test done - has fixed defect, no ischemia but since he has not had LHC before, cardio recs LHC  · Does not appear in volume overload state    · Possible poorly controlled asthma  · ISATU score 1  · troponin normal  · EKG nonischemic  · Last echo showed EF of 55% with grade 1 diastolic dysfunction  · PT OT rec cardiac rehab O  · Since cardiac w/u so far negative, consulted pulmonary & at the same time cardiology placed recs for LHC  · Plan for LHC today, nephro consulted precath -> rec holding diuretic on day of LHC & consider IVF pre & post if ok with HF team  · Pulm - prednisone taper, cont triple inhaler, discuss other options outpatient

## 2022-09-06 NOTE — ASSESSMENT & PLAN NOTE
Wt Readings from Last 3 Encounters:   09/02/22 (!) 139 kg (306 lb)   08/17/22 (!) 142 kg (312 lb 6 4 oz)   08/08/22 (!) 142 kg (312 lb)     · Clinically appears euvolemic    · Weight is lower than last cardiology appointment on 08/17/2022  · Hyponatremia noted  · No signs of overload  · Continue home dose Bumex 3 mg BID  · Check cardiac BMP  · I&O and daily weight  · Cardiology is following  · Salt and fluid-restricted diet

## 2022-09-06 NOTE — ASSESSMENT & PLAN NOTE
Lab Results   Component Value Date    HGBA1C 5 7 06/16/2022       Recent Labs     09/05/22  1610 09/05/22  2102 09/06/22  0611 09/06/22  1158   POCGLU 325* 234* 159* 266*       Blood Sugar Average: Last 72 hrs:  (P) 760 [id number]   Hold home dose sitagliptin while in hospital   Insulin sliding scale Accu-Cheks  Adjust insulin dosage  Titrate insulin based on blood sugar level    Hypoglycemia protocol  Diabetic diet

## 2022-09-06 NOTE — PROGRESS NOTES
[facility]  Progress Note - Tanner Durham 1965, 56 y o  male MRN: [id number]  Unit/Bed#: BE CATH LAB ROOM Encounter: [id number]  Primary Care Provider: Gaynelle Sever, MD   Date and time admitted to hospital: 9/1/2022  5:08 PM    * Chest pain and shortness of breath  Assessment & Plan  · Associated with shortness of breath  Mild wheezing on exam   · this has been ongoing intermittently for quite some time  · Unclear etiology  · rule out ACS -> Nuclear stress test done - has fixed defect, no ischemia but since he has not had LHC before, cardio recs LHC  · Does not appear in volume overload state  · Possible poorly controlled asthma  · ISATU score 1  · troponin normal  · EKG nonischemic  · Last echo showed EF of 55% with grade 1 diastolic dysfunction  · PT OT rec cardiac rehab O  · Since cardiac w/u so far negative, consulted pulmonary & at the same time cardiology placed recs for LHC  · Plan for LHC today, nephro consulted precath -> rec holding diuretic on day of LHC & consider IVF pre & post if ok with HF team  · Pulm - prednisone taper, cont triple inhaler, discuss other options outpatient    Severe persistent asthma with acute exacerbation  Assessment & Plan  · Worsening shortness of breath with increase clear phlegm production  · Last exacerbation requiring tapering PO Steroids in May 2022  · Follows up with Pulmonary outpatient  · Started on PO prednisone 40 mg daily  · Incentive spirometry respiratory protocol    · Continue home dose Singulair, Xopenex, Symbicort  · Incruse Ellipta daily  · Procalcitonin elevated, hence started on IV rocephin - now stopped  · Pulm consulted as no improvement in symptoms -> rec prednisone taper by 3 days, continue triple inhaler & discuss other options at OP appt  · Wheezing has resolved    Acute renal failure superimposed on stage 3 chronic kidney disease [facility])  Assessment & Plan  Lab Results   Component Value Date    EGFR 45 09/05/2022    EGFR 35 09/03/2022    EGFR 33 09/02/2022    CREATININE 1 66 (H) 09/05/2022    CREATININE 2 06 (H) 09/03/2022    CREATININE 2 17 (H) 09/02/2022     · Baseline creatinine seems between 1 6-1 9  · Currently at baseline  · Continue home dose Bumex  · Trend BMP  · I&O and daily weight    Paroxysmal atrial fibrillation (HCC)  Assessment & Plan  Continue with home dose metoprolol  Continue home dose Eliquis for anticoagulation    Chronic diastolic heart failure [facility])  Assessment & Plan  Wt Readings from Last 3 Encounters:   09/02/22 (!) 139 kg (306 lb)   08/17/22 (!) 142 kg (312 lb 6 4 oz)   08/08/22 (!) 142 kg (312 lb)     · Clinically appears euvolemic  · Weight is lower than last cardiology appointment on 08/17/2022  · Hyponatremia noted  · No signs of overload  · Continue home dose Bumex 3 mg BID  · Check cardiac BMP  · I&O and daily weight  · Cardiology is following  · Salt and fluid-restricted diet        Type 2 diabetes mellitus, without long-term current use of insulin [facility])  Assessment & Plan  Lab Results   Component Value Date    HGBA1C 5 7 06/16/2022       Recent Labs     09/05/22  1610 09/05/22  2102 09/06/22  0611 09/06/22  1158   POCGLU 325* 234* 159* 266*       Blood Sugar Average: Last 72 hrs:  (P) 212 [id number]   Hold home dose sitagliptin while in hospital   Insulin sliding scale Accu-Cheks  Adjust insulin dosage  Titrate insulin based on blood sugar level  Hypoglycemia protocol  Diabetic diet    Essential hypertension  Assessment & Plan  Continue home dose metoprolol, Bumex and amlodipine with holding parameters  VTE Pharmacologic Prophylaxis:   High Risk (Score >/= 5) - Pharmacological DVT Prophylaxis Ordered: apixaban (Eliquis)  Sequential Compression Devices Ordered  Patient Centered Rounds: I performed bedside rounds with nursing staff today  Discussions with Specialists or Other Care Team Provider:     Education and Discussions with Family / Patient: Patient  Time Spent for Care: 45 minutes  More than 50% of total time spent on counseling and coordination of care as described above  Current Length of Stay: 5 day(s)  Current Patient Status: Inpatient   Certification Statement: The patient will continue to require additional inpatient hospital stay due to Cardiac catheterization today  Discharge Plan: Anticipate discharge tomorrow to home  Code Status: Level 1 - Full Code    Subjective:   Patient seen and examined  Comfortable in bed  NPO for cardiac catheterization today  No chest pain or shortness of breath  Clinically improving    Objective:     Vitals:   Temp (24hrs), Av °F (36 7 °C), Min:97 8 °F (36 6 °C), Max:98 1 °F (36 7 °C)    Temp:  [97 8 °F (36 6 °C)-98 1 °F (36 7 °C)] 97 8 °F (36 6 °C)  HR:  [63-76] 65  Resp:  [15-17] 15  BP: (128-157)/(69-87) 155/87  SpO2:  [94 %-99 %] 99 %  Body mass index is 41 5 kg/m²  Input and Output Summary (last 24 hours):      Intake/Output Summary (Last 24 hours) at 2022 1215  Last data filed at 2022 0801  Gross per 24 hour   Intake --   Output 1700 ml   Net -1700 ml       Physical Exam:   Physical Exam   Patient is awake alert oriented no acute distress  Lung clear to auscultation bilateral  Heart positive S1-S2 no murmur  Abdomen soft nontender  Lower extremities no edema    Additional Data:     Labs:  Results from last 7 days   Lab Units 22  0641   WBC Thousand/uL 6 96   HEMOGLOBIN g/dL 12 6   HEMATOCRIT % 37 8   PLATELETS Thousands/uL 212   NEUTROS PCT % 94*   LYMPHS PCT % 3*   MONOS PCT % 2*   EOS PCT % 0     Results from last 7 days   Lab Units 22  0524 22  0641 22  1721   SODIUM mmol/L 135   < > 128*   POTASSIUM mmol/L 3 6   < > 3 5   CHLORIDE mmol/L 102   < > 93*   CO2 mmol/L 27   < > 21   BUN mg/dL 41*   < > 20   CREATININE mg/dL 1 66*   < > 1 91*   ANION GAP mmol/L 6   < > 14*   CALCIUM mg/dL 8 3   < > 8 2*   ALBUMIN g/dL  --   --  3 0*   TOTAL BILIRUBIN mg/dL  --   --  1 41* ALK PHOS U/L  --   --  147*   ALT U/L  --   --  33   AST U/L  --   --  44   GLUCOSE RANDOM mg/dL 191*   < > 149*    < > = values in this interval not displayed  Results from last 7 days   Lab Units 09/06/22  1158 09/06/22  0611 09/05/22  2102 09/05/22  1610 09/05/22  1104 09/05/22  4155 09/04/22 2038 09/04/22  1630 09/04/22  1102 09/04/22  0714 09/03/22  2047 09/03/22  1622   POC GLUCOSE mg/dl 266* 159* 234* 325* 211* 189* 246* 303* 216* 194* 288* 248*         Results from last 7 days   Lab Units 09/02/22  0641 09/01/22 1942   PROCALCITONIN ng/ml 1 48* 1 74*       Lines/Drains:  Invasive Devices  Report    Peripheral Intravenous Line  Duration           Peripheral IV 09/06/22 Distal;Right;Upper;Ventral (anterior) Arm <1 day                      Imaging: No pertinent imaging reviewed  Recent Cultures (last 7 days):   Results from last 7 days   Lab Units 09/01/22 1942   BLOOD CULTURE  No Growth After 4 Days  No Growth After 4 Days         Last 24 Hours Medication List:   Current Facility-Administered Medications   Medication Dose Route Frequency Provider Last Rate    acetaminophen  650 mg Oral Q4H PRN Mariza Suero MD      amLODIPine  5 mg Oral Q12H [address] RODRIGUE Rodriges      apixaban  5 mg Oral BID Melinda Loving DO      atorvastatin  10 mg Oral Daily Pilo Tobias MD      benzonatate  100 mg Oral TID PRN Pilo Tobias MD      budesonide-formoterol  2 puff Inhalation BID Pilo Tobias MD      insulin glargine  15 Units Subcutaneous HS Melinda Loving DO      insulin lispro  1-5 Units Subcutaneous HS Pilo Tobias MD      insulin lispro  1-6 Units Subcutaneous TID [facility] Pilo Tobias MD      insulin lispro  5 Units Subcutaneous TID With Meals Melinda Loving DO      levalbuterol  1 25 mg Nebulization Q8H PRN Keshawn Smith DO      metoprolol succinate  50 mg Oral Daily Pilo Tobias MD      montelukast  10 mg Oral HS Pilo Tobias MD      ondansetron  4 mg Intravenous Q6H PRN Aristeo Christina MD      potassium chloride  40 mEq Oral BID Ulysses Olguin MD      predniSONE  30 mg Oral Daily Bobie Bones, DO      Followed by   Breann Delgado ON 9/8/2022] predniSONE  20 mg Oral Daily Bobie Bones, DO      Followed by   Breann Delgado ON 9/11/2022] predniSONE  10 mg Oral Daily Bobie Bones, DO      sodium chloride  75 mL/hr Intravenous Continuous Aristeo Christina MD      sodium chloride  3 mL Nebulization Q8H PRN Alex Samano MD      umeclidinium bromide  1 puff Inhalation Daily Ulysses Olguin MD          Today, Patient Was Seen By: Christel Vegas DO    **Please Note: This note may have been constructed using a voice recognition system  **

## 2022-09-06 NOTE — RESTORATIVE TECHNICIAN NOTE
Restorative Technician Note      Patient Name: David Petersen     Note Type: Mobility  Patient Position Upon Consult: Supine  Activity Performed: Ambulated; Dangled; Stood  Assistive Device: Other (Comment) (none)  Education Provided: Yes  Patient Position at End of Consult: Seated edge of bed;  All needs within reach; Bed/Chair alarm activated      Herminia VERGARA, Restorative Technician, [employer]

## 2022-09-06 NOTE — PROGRESS NOTES
Advanced Heart Failure/Pulmonary Hypertension - Attending Matthias Ammon Cano 56 y o  male MRN: [id number]      Assessment:  56 y o  male Hx per chart p/w chest pain; fixed defects on stress test, ongoing chest pain, for LHC  Principal Problem:    Chest pain and shortness of breath  Active Problems:    Essential hypertension    Type 2 diabetes mellitus, without long-term current use of insulin (HCC)    VICKY (obstructive sleep apnea)    Hyponatremia    Severe persistent asthma without complication    Chronic diastolic heart failure (HCC)    Stage 3a chronic kidney disease (HCC)    Paroxysmal atrial fibrillation (HCC)    Chills and leukocytosis     # Chest pain  Unclear etiology  Possibly respiratory related to asthma, +/- viral illness,  versus ischemic etiology, though symptoms not entirely consistent with typical angina  No ischemic EKG changes, troponins negative  Nuclear stress test with mostly fixed defects in the basal to mid inferior and inferolateral walls      # Chronic HFimpEF (45% funmi>>55%), Stage C, NYHA III     Mildly volume up, stable PADs on CardioMems at 15   Diuretic:   recent increase to Bumex 3 mg BID w/ K 40 meq BID    Weight:    314 lbs >>306 lbs   NT proBNP: 9/1/22 2545   7/29/22: 903    609 4/8/22          Studies- personally reviewed by me           Pharm Nuc Stress 9/2/22: fixed defects in the basal to mid inferior and inferolateral walls     RHC 3/9/22:     RA 4     RV 35/4     PA 35/12/21     PCWP 10     PA sat 64%     Travis CO 5 56 L/min     Travis CI 2 2L/min/m2     PVR 1 97 SAGASTUME          TTE 11/12/2021:     LVEF 55%  LVIDd 6 0 cm  Grade 1 DD  Normal RV  Trace TR     TTE 07/19/2021:       LVEF 50%  LVIDd 6 13 cm  Grade 1 DD  Normal RV  Trace MR and TR  Mildly dilated IVC       TTE 03/25/2020:   LVEF 40-45%  LVIDd 6 12 cm  Normal RV             Neurohormonal Blockade:     --Beta Blocker: metoprolol succinate 50 mg daily - off   --ARNi / ACEi / ARB: No       --Aldosterone Antagonist: No       --SGLT2 Inhibitor: No      --Home Diuretic: bumex 3mg po BID with potassium 40 meq BID        Sudden Cardiac Death Risk Reduction:     --LVEF >35%             Electrical Resynchronization:     --Candidacy for BiV device: narrow QRS            Advanced Therapies:    Will continue to monitor   LVEF recovered          # Atrial fibrillation     [id number] = 3 (HF, HTN, DM)       Diagnosed 02/2022      Anticoagulation on   Eliquis       Rate control: metoprolol as above   Rhythm control: No           # Hypertension,   mostly controlled   On amlodipine 5mg daily         # Hyperlipidemia     Lipid panel from 10/01/2021: cholesterol 157;  TG 90;     HDL 66; calculated LDL 73      Rx: atorvastatin 10 mg daily             # Diabetes mellitus, type II       Non-insulin dependent      HbA1c 6/16/22: 5 7%          # Obstructive sleep apnea    # Chronic respiratory failure     # Asthma, moderate persistent   # S/p gastric bypass (Samuel-en-Y)surgery      # History of tobacco abuse     # Carpal tunnel syndrome, bilateral      # CKD, Cr 1 56   - 1 94 7/31, 1 92 today   # s/p CardioMems implant 3/9/22     PAD  15mmHg recently     Plan:  Continue current dose of oral bumex      Cath without obstructive dz:  "No obstructive CAD, Cont GDMT for HF  Low-Normal LVEDP"    Needs better BP control    Lung dz Tx and steroids per primary team    DC planning      Current Outpatient Medications   Medication Instructions    Accu-Chek FastClix Lancets MISC Test blood sugar twice daily    albuterol (PROVENTIL HFA,VENTOLIN HFA) 90 mcg/act inhaler 2 puffs, Inhalation, Every 4 hours PRN    amLODIPine (NORVASC) 5 mg, Oral, Daily    apixaban (ELIQUIS) 5 mg, Oral, 2 times daily    atorvastatin (LIPITOR) 10 mg, Oral, Daily    benzonatate (TESSALON PERLES) 100 mg, Oral, 3 times daily PRN    Blood Glucose Monitoring Suppl (Accu-Chek Guide) w/Device KIT Does not apply, 2 times daily, Test blood sugar twice daily    bumetanide (BUMEX) 3 mg, Oral, 2 times daily    levalbuterol (XOPENEX) 1 25 mg, Nebulization, 3 times daily    metoprolol succinate (TOPROL-XL) 50 mg, Oral, Daily    montelukast (SINGULAIR) 10 mg, Oral, Daily at bedtime    potassium chloride (K-DUR,KLOR-CON) 20 mEq tablet 40 mEq, Oral, 2 times daily    sitaGLIPtin (Januvia) 100 mg tablet Take 1/2 tab  daily    Symbicort 160-4 5 MCG/ACT inhaler 2 PUFFS BY MOUTH TWICE DAILY , RINSE MOUTH THEN SPIT AFTER USE    tiotropium (Spiriva Respimat) 1 25 MCG/ACT AERS inhaler 2 puffs, Inhalation, Daily        Thank you for the opportunity to participate in the care of this patient      Manny Baca MD  Attending Physician  Advanced Heart Failure and Transplant Cardiology  [address] [place] [address]

## 2022-09-06 NOTE — PROGRESS NOTES
PULMONOLOGY PROGRESS NOTE     Name: David Petersen   Age & Sex: 56 y o  male   MRN: [id number]  Unit/Bed#: BE CATH LAB ROOM   Encounter: [id number]    PATIENT INFORMATION     Name: David Petersen   Age & Sex: 56 y o  male   MRN: [id number]  Hospital Stay Days: 5    ASSESSMENT/PLAN     1  Severe persistent asthma - Mild exacerbation  Currently improved/at baseline, but on a prednisone taper - day 2 of prednisone 30mg x 3 days, 20mg x 3 days, 10mg x3 days  2  ANTONIO on CKD - improving  3  Peripheral eosinophilia - Chronically elevated, currently suppressed by steroids  4  HFpEF - near euvolemic  Perfusion defects noted on NM scan 22  Currenlty planning for cath today  Optimized for procedure from a pulmonary standpoint  Nephrology following  5  VICKY - Should be on home APAP 4-20   6  pAfib  7  HTN    Plan:  - Finish prednisone taper as scheduled  - Triple therapy inhalers  Transition back to home regimen on DC   - Xopenex PRN, respiratory protocol  - Continue singulair  - Optimized from a pulmonary standpoint for cath today  - Cardiology and nephrology recommendations appreciated  - Will need outpatient pulmonary follow up for assessment for biologic therapy  - DVT ppx      SUBJECTIVE     Patient seen and examined  No acute events overnight  Patient feels generally well today  Denies orthopnea, dyspnea, cough, sputum production, fevers or chills  ROS otherwise normal      OBJECTIVE     Vitals:    22 1713 22 2144 22 0740 22 1120   BP: 138/69 157/87 155/87    Pulse: 76 63 65    Resp:  16 15    Temp:  97 8 °F (36 6 °C) 97 8 °F (36 6 °C)    TempSrc:       SpO2: 95% 96% 98% 99%   Weight:       Height:          Temperature:   Temp (24hrs), Av °F (36 7 °C), Min:97 8 °F (36 6 °C), Max:98 1 °F (36 7 °C)    Temperature: 97 8 °F (36 6 °C)  Intake & Output:  I/O        07 07 07 07 07 0700    P  O         Total Intake(mL/kg)       Urine (mL/kg/hr) 2900 (0 9) 1000 (0 3) 700 (1)    Total Output 2900 1000 700    Net -2900 -1000 -700               Weights:   IBW (Ideal Body Weight): 77 6 kg    Body mass index is 41 5 kg/m²  Weight (last 2 days)     Date/Time    09/06/22 1041    Comment rows:    OBSERV: Received by stretcher to [facility] holding area for elective study  Aware of planned procedure and asking questions freely  Without current complaints  Communicating freely pleasent and coopertive  at 09/06/22 1041        Physical Exam  Vitals and nursing note reviewed  Constitutional:       General: He is not in acute distress  Appearance: He is well-developed  He is obese  HENT:      Head: Normocephalic and atraumatic  Eyes:      Conjunctiva/sclera: Conjunctivae normal    Cardiovascular:      Rate and Rhythm: Normal rate  Rhythm irregular  Heart sounds: No murmur heard  No friction rub  No gallop  Pulmonary:      Effort: Pulmonary effort is normal  No respiratory distress  Breath sounds: Normal breath sounds  No wheezing, rhonchi or rales  Abdominal:      Palpations: Abdomen is soft  Tenderness: There is no abdominal tenderness  Skin:     General: Skin is warm and dry  Neurological:      General: No focal deficit present  Mental Status: He is alert and oriented to person, place, and time  LABORATORY DATA     Labs: I have personally reviewed pertinent reports    Results from last 7 days   Lab Units 09/02/22  0641 09/01/22  1721   WBC Thousand/uL 6 96 12 39*   HEMOGLOBIN g/dL 12 6 12 3   HEMATOCRIT % 37 8 37 3   PLATELETS Thousands/uL 212 239   NEUTROS PCT % 94* 88*   MONOS PCT % 2* 7      Results from last 7 days   Lab Units 09/05/22  0524 09/03/22  0519 09/02/22  0641 09/01/22  1721   POTASSIUM mmol/L 3 6 3 8 3 9 3 5   CHLORIDE mmol/L 102 99 94* 93*   CO2 mmol/L 27 31 27 21   BUN mg/dL 41* 39* 28* 20   CREATININE mg/dL 1 66* 2 06* 2 17* 1 91*   CALCIUM mg/dL 8 3 8 7 8 7 8 2*   ALK PHOS U/L  --   --   --  147*   ALT U/L  -- --   --  33   AST U/L  --   --   --  44       IMAGING & DIAGNOSTIC TESTING     Radiology Results: I have personally reviewed pertinent reports  XR chest 2 views    Result Date: 9/2/2022  Impression: No acute cardiopulmonary findings  Workstation performed: [id number]     Other Diagnostic Testing: I have personally reviewed pertinent reports      ACTIVE MEDICATIONS     Current Facility-Administered Medications   Medication Dose Route Frequency    acetaminophen (TYLENOL) tablet 650 mg  650 mg Oral Q4H PRN    amLODIPine (NORVASC) tablet 5 mg  5 mg Oral Q12H [address]    atorvastatin (LIPITOR) tablet 10 mg  10 mg Oral Daily    benzonatate (TESSALON PERLES) capsule 100 mg  100 mg Oral TID PRN    budesonide-formoterol (SYMBICORT) 160-4 5 mcg/act inhaler 2 puff  2 puff Inhalation BID    insulin glargine (LANTUS) subcutaneous injection 15 Units 0 15 mL  15 Units Subcutaneous HS    insulin lispro (HumaLOG) 100 units/mL subcutaneous injection 1-5 Units  1-5 Units Subcutaneous HS    insulin lispro (HumaLOG) 100 units/mL subcutaneous injection 1-6 Units  1-6 Units Subcutaneous TID AC    insulin lispro (HumaLOG) 100 units/mL subcutaneous injection 5 Units  5 Units Subcutaneous TID With Meals    levalbuterol (XOPENEX) inhalation solution 1 25 mg  1 25 mg Nebulization Q8H PRN    metoprolol succinate (TOPROL-XL) 24 hr tablet 50 mg  50 mg Oral Daily    montelukast (SINGULAIR) tablet 10 mg  10 mg Oral HS    ondansetron (ZOFRAN) injection 4 mg  4 mg Intravenous Q6H PRN    potassium chloride (K-DUR,KLOR-CON) CR tablet 40 mEq  40 mEq Oral BID    predniSONE tablet 30 mg  30 mg Oral Daily    Followed by   Verdie Slice ON 9/8/2022] predniSONE tablet 20 mg  20 mg Oral Daily    Followed by   Verdie Slice ON 9/11/2022] predniSONE tablet 10 mg  10 mg Oral Daily    sodium chloride 0 9 % infusion  75 mL/hr Intravenous Continuous    sodium chloride 0 9 % inhalation solution 3 mL  3 mL Nebulization Q8H PRN    umeclidinium bromide (INCRUSE ELLIPTA) 62 5 mcg/inh inhaler AEPB 1 puff  1 puff Inhalation Daily       VTE Pharmacologic Prophylaxis: Sequential compression device (Venodyne)   VTE Mechanical Prophylaxis: sequential compression device      Disclaimer: Portions of the record may have been created with voice recognition software  Occasional wrong word or "sound a like" substitutions may have occurred due to the inherent limitations of voice recognition software  Careful consideration should be taken to recognize, using context, where substitutions have occurred      Keny Conrad DO  Pulmonary/Critical Care Fellowship PGY-V  [facility]

## 2022-09-06 NOTE — PROGRESS NOTES
Heart Failure/ Pulmonary Hypertension Progress Note - Walker Cabello 56 y o  male MRN: [id number]    Unit/Bed#: [facility] 710-01 Encounter: [id number]      Assessment:    Principal Problem:    Chest pain and shortness of breath  Active Problems:    Essential hypertension    Type 2 diabetes mellitus, without long-term current use of insulin (HCC)    VICKY (obstructive sleep apnea)    Hyponatremia    Severe persistent asthma without complication    Chronic diastolic heart failure (HCC)    Stage 3a chronic kidney disease (HCC)    Paroxysmal atrial fibrillation (HCC)    Chills and leukocytosis      Subjective:   Patient seen and examined  No significant events overnight  For Left heart cath today  Objective: Intake/ Output: no I/Os  Weight: 306   Tele:     Plan:  Chest pain  Unclear etiology  Possibly respiratory related to asthma, +/- viral illness,  versus ischemic etiology, Troponins negative  EKG WNL and unchanged from prior tracings  Nuclear stress imaging showing mostly fixed defects of the basal to mid inferior walls  Plan for C today to assess coronary anatomy  NPI 9/2/22: There is a left ventricular perfusion defect that is medium in size with moderate reduction in uptake present in the basal to mid inferior location(s) that is fixed suggestive of an inferior infarct  Significant ischemia is not present      Chronic HFpEF   Volume :Bumex 3 mg BID  HTN: Amlodipine 5 mg daily  Compensated on exam today and most recent cardiomems readings at goal  Will continue for now on usual oral diuretic regimen  Elevated NT Pro BNP may be r/t ANTONIO, +/- possible acute viral infection       NT Pro BNP 2545                       Lab Results   Component Value Date     NTBNP 619 (H) 11/09/2021      RHC 3/9/22:    RA 4    RV 35/4    PA 35/12/21    PCWP 10    PA sat 64%    Travis CO 5 56 L/min    Travis CI 2 2L/min/m2    PVR 1 97 SAGASTUME       TTE 11/12/21: LVEF 55%, LVIDd 6 cm,  grade I DD, RV normal, basal inferior and inferolateral walls hypokinetic, trace TR  TTE 7/20201: LVEF 50%, LVIDd 6 1 cm,  no RWMA, akinesis of basal inferior and basal mid inferolateral walls, trace MR, trace TR, IVC mildly dilated, RV normal  TTE 3/2020: LVEF 40-45%, LVIDd 6 12 cm,  RV normal, no MR/TR, no effusions,      Atrial fibrillation    [id number] = 3 (HF, HTN, DM)      Diagnosed 02/2022     Anticoagulation on Eliquis      Rate control: metoprolol as above  Rhythm control: No      HTN   Well controlled  Type II DM  A1C 5 7 %  HLD  FLP 10/1/21: , TG 90, HDL 66, LDL 73  On atorvastatin 10 mg daily  Chronic respiratory insufficiency  -PFTs 7/19/21:   Interpretation:  Severe obstructive airflow defect on spirometry  Significant improvement in airflow or forced vital capacity in response to the administration of bronchodilator per ATS standards  Air trapping as indicated by the lung volumes  Mild decrease in diffusion capacity  Flow-volume loop consistent with obstruction     ANTONIO on CKD  Baseline creat around 1 6  1 8  Now stabilized  Moderate persistent asthma    Morbid obesity  VICKY  CPAP on recall  Working on getting new equipment  Tobacco abuse  Occasional cigar  Quit 1 year ago        Review of Systems   All other systems reviewed and are negative  [employer] (day, reason): Cotton catheter (day, reason):    Vitals: Blood pressure 155/87, pulse 65, temperature 97 8 °F (36 6 °C), resp  rate 15, height 6' (1 829 m), weight (!) 139 kg (306 lb), SpO2 98 %  , Body mass index is 41 5 kg/m² , I/O last 3 completed shifts:  In: -   Out: 2800 [Urine:2800]  I/O this shift:  In: -   Out: 700 [Urine:700]  Wt Readings from Last 3 Encounters:   09/02/22 (!) 139 kg (306 lb)   08/17/22 (!) 142 kg (312 lb 6 4 oz)   08/08/22 (!) 142 kg (312 lb)       Intake/Output Summary (Last 24 hours) at 9/6/2022 1006  Last data filed at 9/6/2022 0801  Gross per 24 hour   Intake --   Output 1700 ml   Net -1700 ml     I/O last 3 completed shifts:  In: -   Out: 2800 [Urine:2800]    No significant arrhythmias seen on telemetry review         Physical Exam:  Vitals:    09/05/22 1457 09/05/22 1713 09/05/22 2144 09/06/22 0740   BP: 128/80 138/69 157/87 155/87   Pulse: 72 76 63 65   Resp:   16 15   Temp: 98 1 °F (36 7 °C)  97 8 °F (36 6 °C) 97 8 °F (36 6 °C)   TempSrc:       SpO2: 94% 95% 96% 98%   Weight:       Height:           GEN: Allena Sher appears well, alert and oriented x 3, pleasant and cooperative   HEENT: pupils equal, round, and reactive to light; extraocular muscles intact  NECK: supple, no carotid bruits   HEART: regular rhythm, normal S1 and S2, no murmurs, clicks, gallops or rubs, JVP is flat   LUNGS: clear to auscultation bilaterally; no wheezes, rales, or rhonchi   ABDOMEN: normal bowel sounds, soft, no tenderness, no distention  EXTREMITIES: peripheral pulses normal; no clubbing, cyanosis, or edema  NEURO: no focal findings   SKIN: normal without suspicious lesions on exposed skin      Current Facility-Administered Medications:     amLODIPine (NORVASC) tablet 5 mg, 5 mg, Oral, Daily, Ulysses Olguin MD, 5 mg at 09/06/22 0829    atorvastatin (LIPITOR) tablet 10 mg, 10 mg, Oral, Daily, Ulysses Olguin MD, 10 mg at 09/06/22 0829    benzonatate (TESSALON PERLES) capsule 100 mg, 100 mg, Oral, TID PRN, Ulysses Olguin MD    budesonide-formoterol [facility]) 160-4 5 mcg/act inhaler 2 puff, 2 puff, Inhalation, BID, Ulysses Olguin MD, 2 puff at 09/06/22 0830    insulin glargine (LANTUS) subcutaneous injection 15 Units 0 15 mL, 15 Units, Subcutaneous, BEKAH, Christel Vegas,     insulin lispro (HumaLOG) 100 units/mL subcutaneous injection 1-5 Units, 1-5 Units, Subcutaneous, HS, Ulysses Olguin MD, 2 Units at 09/05/22 2131    insulin lispro (HumaLOG) 100 units/mL subcutaneous injection 1-6 Units, 1-6 Units, Subcutaneous, TID AC, 5 Units at 09/05/22 1641 **AND** Fingerstick Glucose (POCT), , , TID MICHEAL, Ulysses Olguin MD    insulin lispro (HumaLOG) 100 units/mL subcutaneous injection 5 Units, 5 Units, Subcutaneous, TID With Meals, Larry Baca DO    levalbuterol [facility]) inhalation solution 1 25 mg, 1 25 mg, Nebulization, Q8H PRN, Vashti Bright DO    metoprolol succinate (TOPROL-XL) 24 hr tablet 50 mg, 50 mg, Oral, Daily, Kate Bradley MD, 50 mg at 09/06/22 0829    montelukast (SINGULAIR) tablet 10 mg, 10 mg, Oral, HS, Kate Bradley MD, 10 mg at 09/06/22 0829    potassium chloride (K-DUR,KLOR-CON) CR tablet 40 mEq, 40 mEq, Oral, BID, Kate Bradley MD, 40 mEq at 09/06/22 3275    [COMPLETED] predniSONE tablet 40 mg, 40 mg, Oral, Daily, 40 mg at 09/04/22 0817 **FOLLOWED BY** predniSONE tablet 30 mg, 30 mg, Oral, Daily, 30 mg at 09/06/22 0829 **FOLLOWED BY** [START ON 9/8/2022] predniSONE tablet 20 mg, 20 mg, Oral, Daily **FOLLOWED BY** [START ON 9/11/2022] predniSONE tablet 10 mg, 10 mg, Oral, Daily, Vashti Bright DO    sodium chloride 0 9 % inhalation solution 3 mL, 3 mL, Nebulization, Q8H PRN, Patti Domínguez MD    umeclidinium bromide (INCRUSE ELLIPTA) 62 5 mcg/inh inhaler AEPB 1 puff, 1 puff, Inhalation, Daily, Kate Bradley MD, 1 puff at 09/06/22 0830      Labs & Results:        Results from last 7 days   Lab Units 09/02/22  0641 09/01/22  1721   WBC Thousand/uL 6 96 12 39*   HEMOGLOBIN g/dL 12 6 12 3   HEMATOCRIT % 37 8 37 3   PLATELETS Thousands/uL 212 239         Results from last 7 days   Lab Units 09/05/22  0524 09/03/22  0519 09/02/22  0641 09/01/22  1721   POTASSIUM mmol/L 3 6 3 8 3 9 3 5   CHLORIDE mmol/L 102 99 94* 93*   CO2 mmol/L 27 31 27 21   BUN mg/dL 41* 39* 28* 20   CREATININE mg/dL 1 66* 2 06* 2 17* 1 91*   CALCIUM mg/dL 8 3 8 7 8 7 8 2*   ALK PHOS U/L  --   --   --  147*   ALT U/L  --   --   --  33   AST U/L  --   --   --  44           Counseling / Coordination of Care  Total floor / unit time spent today 20 minutes  Greater than 50% of total time was spent with the patient and / or family counseling and / or coordination of care    A description of the counseling / coordination of care: 20  Thank you for the opportunity to participate in the care of this patient  Capri Vega

## 2022-09-07 ENCOUNTER — TRANSITIONAL CARE MANAGEMENT (OUTPATIENT)
Dept: FAMILY MEDICINE CLINIC | Facility: CLINIC | Age: 57
End: 2022-09-07

## 2022-09-07 ENCOUNTER — TELEPHONE (OUTPATIENT)
Dept: CARDIOLOGY CLINIC | Facility: CLINIC | Age: 57
End: 2022-09-07

## 2022-09-07 VITALS
HEIGHT: 72 IN | HEART RATE: 65 BPM | DIASTOLIC BLOOD PRESSURE: 71 MMHG | BODY MASS INDEX: 41.45 KG/M2 | SYSTOLIC BLOOD PRESSURE: 133 MMHG | TEMPERATURE: 97.2 F | WEIGHT: 306 LBS | OXYGEN SATURATION: 98 % | RESPIRATION RATE: 16 BRPM

## 2022-09-07 PROBLEM — R68.83 CHILLS: Status: RESOLVED | Noted: 2022-09-01 | Resolved: 2022-09-07

## 2022-09-07 LAB
ANION GAP SERPL CALCULATED.3IONS-SCNC: 7 MMOL/L (ref 4–13)
ATRIAL RATE: 58 BPM
ATRIAL RATE: 62 BPM
BUN SERPL-MCNC: 40 MG/DL (ref 5–25)
CALCIUM SERPL-MCNC: 8.9 MG/DL (ref 8.3–10.1)
CCP AB SER IA-ACNC: <0.4
CHLORIDE SERPL-SCNC: 104 MMOL/L (ref 96–108)
CO2 SERPL-SCNC: 27 MMOL/L (ref 21–32)
CREAT SERPL-MCNC: 1.57 MG/DL (ref 0.6–1.3)
DSDNA AB SER-ACNC: <1 IU/ML (ref 0–9)
ERYTHROCYTE [DISTWIDTH] IN BLOOD BY AUTOMATED COUNT: 12.3 % (ref 11.6–15.1)
GFR SERPL CREATININE-BSD FRML MDRD: 48 ML/MIN/1.73SQ M
GLUCOSE SERPL-MCNC: 152 MG/DL (ref 65–140)
GLUCOSE SERPL-MCNC: 158 MG/DL (ref 65–140)
HCT VFR BLD AUTO: 35 % (ref 36.5–49.3)
HGB BLD-MCNC: 11.3 G/DL (ref 12–17)
MAGNESIUM SERPL-MCNC: 2.8 MG/DL (ref 1.6–2.6)
MCH RBC QN AUTO: 29.4 PG (ref 26.8–34.3)
MCHC RBC AUTO-ENTMCNC: 32.3 G/DL (ref 31.4–37.4)
MCV RBC AUTO: 91 FL (ref 82–98)
P AXIS: 41 DEGREES
P AXIS: 62 DEGREES
PLATELET # BLD AUTO: 250 THOUSANDS/UL (ref 149–390)
PMV BLD AUTO: 10.8 FL (ref 8.9–12.7)
POTASSIUM SERPL-SCNC: 4.4 MMOL/L (ref 3.5–5.3)
PR INTERVAL: 132 MS
PR INTERVAL: 144 MS
QRS AXIS: 24 DEGREES
QRS AXIS: 24 DEGREES
QRSD INTERVAL: 80 MS
QRSD INTERVAL: 92 MS
QT INTERVAL: 434 MS
QT INTERVAL: 456 MS
QTC INTERVAL: 426 MS
QTC INTERVAL: 462 MS
RBC # BLD AUTO: 3.84 MILLION/UL (ref 3.88–5.62)
RHEUMATOID FACT SER QL LA: NEGATIVE
RYE IGE QN: NEGATIVE
SODIUM SERPL-SCNC: 138 MMOL/L (ref 135–147)
T WAVE AXIS: 50 DEGREES
T WAVE AXIS: 56 DEGREES
VENTRICULAR RATE: 58 BPM
VENTRICULAR RATE: 62 BPM
WBC # BLD AUTO: 9.43 THOUSAND/UL (ref 4.31–10.16)

## 2022-09-07 PROCEDURE — 80048 BASIC METABOLIC PNL TOTAL CA: CPT | Performed by: STUDENT IN AN ORGANIZED HEALTH CARE EDUCATION/TRAINING PROGRAM

## 2022-09-07 PROCEDURE — 82948 REAGENT STRIP/BLOOD GLUCOSE: CPT

## 2022-09-07 PROCEDURE — 99232 SBSQ HOSP IP/OBS MODERATE 35: CPT | Performed by: INTERNAL MEDICINE

## 2022-09-07 PROCEDURE — 85027 COMPLETE CBC AUTOMATED: CPT | Performed by: STUDENT IN AN ORGANIZED HEALTH CARE EDUCATION/TRAINING PROGRAM

## 2022-09-07 PROCEDURE — 93010 ELECTROCARDIOGRAM REPORT: CPT | Performed by: INTERNAL MEDICINE

## 2022-09-07 PROCEDURE — 83735 ASSAY OF MAGNESIUM: CPT | Performed by: STUDENT IN AN ORGANIZED HEALTH CARE EDUCATION/TRAINING PROGRAM

## 2022-09-07 PROCEDURE — 99239 HOSP IP/OBS DSCHRG MGMT >30: CPT | Performed by: INTERNAL MEDICINE

## 2022-09-07 RX ORDER — AMLODIPINE BESYLATE 5 MG/1
5 TABLET ORAL DAILY
Qty: 30 TABLET | Refills: 0 | Status: SHIPPED | OUTPATIENT
Start: 2022-09-07 | End: 2022-10-10

## 2022-09-07 RX ORDER — PREDNISONE 10 MG/1
10 TABLET ORAL DAILY
Qty: 10 TABLET | Refills: 0 | Status: SHIPPED | OUTPATIENT
Start: 2022-09-07 | End: 2022-09-15 | Stop reason: ALTCHOICE

## 2022-09-07 RX ADMIN — INSULIN LISPRO 1 UNITS: 100 INJECTION, SOLUTION INTRAVENOUS; SUBCUTANEOUS at 07:46

## 2022-09-07 RX ADMIN — APIXABAN 5 MG: 5 TABLET, FILM COATED ORAL at 08:00

## 2022-09-07 RX ADMIN — AMLODIPINE BESYLATE 5 MG: 5 TABLET ORAL at 07:58

## 2022-09-07 RX ADMIN — UMECLIDINIUM 1 PUFF: 62.5 AEROSOL, POWDER ORAL at 08:00

## 2022-09-07 RX ADMIN — METOPROLOL SUCCINATE 50 MG: 50 TABLET, EXTENDED RELEASE ORAL at 08:01

## 2022-09-07 RX ADMIN — ATORVASTATIN CALCIUM 10 MG: 10 TABLET, FILM COATED ORAL at 07:57

## 2022-09-07 RX ADMIN — MONTELUKAST 10 MG: 10 TABLET, FILM COATED ORAL at 07:58

## 2022-09-07 RX ADMIN — INSULIN LISPRO 5 UNITS: 100 INJECTION, SOLUTION INTRAVENOUS; SUBCUTANEOUS at 07:46

## 2022-09-07 RX ADMIN — PREDNISONE 30 MG: 20 TABLET ORAL at 08:00

## 2022-09-07 RX ADMIN — BUDESONIDE AND FORMOTEROL FUMARATE DIHYDRATE 2 PUFF: 160; 4.5 AEROSOL RESPIRATORY (INHALATION) at 08:00

## 2022-09-07 RX ADMIN — POTASSIUM CHLORIDE 40 MEQ: 1500 TABLET, EXTENDED RELEASE ORAL at 07:57

## 2022-09-07 NOTE — PROGRESS NOTES
NEPHROLOGY PROGRESS NOTE   Marston Cabot 56 y o  male MRN: [id number]  Unit/Bed#: [facility] 710-01 Encounter: [id number]  Reason for Consult: CKD, contrast study      SUMMARY:    53-year-old male with a history chronic diastolic congestive heart failure, obstructive sleep apnea, diabetes mellitus type 2, hypertension, chronic kidney disease stage 3 who presented for chest pain  Nephrology consult for cardiac catheterization to reduce the risk of acute kidney injury  ASSESSMENT and PLAN:    Acute kidney injury--resolved  --creatinine admission was 2 1 mg/dL  --back to baseline  --stable from renal standpoint for discharge  --renal function stable post cardiac catheterization  --discontinue any further fluids  --can restart oral diuretics as per Cardiology  --message sent to the office for follow-up  --discussed with hospitalist    Chest pain--resolved  --status post cardiac catheterization on September 6  --no obstructive coronary artery    Chronic kidney disease stage III  --baseline creatinine mid 1's (1 5-1 7) since 2021  --presumed to be secondary to diabetic kidney disease, hypertension, obesity related renal dysfunction and possible cardiorenal syndrome  --will send a message to the office for follow-up    Chronic congestive heart failure with preserved ejection fraction  --Stage C, NYHA III  --resume oral diuretics as per Cardiology  --plan to be restarted on Bumex 2 mg b i d  With potassium chloride 40 mEq b i d      Atrial fibrillation  --rate control and anticoagulation with Eliquis    Hypertension  --continue current antihypertensive agents  --restarting diuretics    Hyperlipidemia  --atorvastatin    Other issues:  Obstructive sleep apnea, status post gastric bypass surgery, history of smoking      SUBJECTIVE / INTERVAL HISTORY:    No chest pain or shortness of breath this morning    OBJECTIVE:  Current Weight: Weight - Scale: (!) 139 kg (306 lb)  Vitals:    09/06/22 1120 09/06/22 1551 09/06/22 2002 09/07/22 0759   BP:  136/82 130/72 133/71   Pulse:  69  65   Resp:    16   Temp:  (!) 97 3 °F (36 3 °C)  (!) 97 2 °F (36 2 °C)   TempSrc:       SpO2: 99% 98%  98%   Weight:       Height:         No intake or output data in the 24 hours ending 09/07/22 0830    Review of Systems:    12 point ROS has been reviewed  Physical Exam  Vitals and nursing note reviewed  Constitutional:       General: He is not in acute distress  Appearance: He is well-developed  He is obese  HENT:      Head: Normocephalic and atraumatic  Eyes:      General: No scleral icterus  Conjunctiva/sclera: Conjunctivae normal       Pupils: Pupils are equal, round, and reactive to light  Cardiovascular:      Rate and Rhythm: Normal rate and regular rhythm  Heart sounds: S1 normal and S2 normal  No murmur heard  No friction rub  No gallop  Pulmonary:      Effort: Pulmonary effort is normal  No respiratory distress  Breath sounds: Normal breath sounds  No wheezing or rales  Abdominal:      General: Bowel sounds are normal       Palpations: Abdomen is soft  Tenderness: There is no abdominal tenderness  There is no rebound  Musculoskeletal:         General: Normal range of motion  Cervical back: Normal range of motion and neck supple  Skin:     Findings: No rash  Neurological:      Mental Status: He is alert and oriented to person, place, and time     Psychiatric:         Behavior: Behavior normal          Medications:    Current Facility-Administered Medications:     acetaminophen (TYLENOL) tablet 650 mg, 650 mg, Oral, Q4H PRN, Ailyn Fair MD    amLODIPine [facility]) tablet 5 mg, 5 mg, Oral, Q12H [facility], RODRIGUE Rodriges, 5 mg at 09/07/22 0758    apixaban (ELIQUIS) tablet 5 mg, 5 mg, Oral, BID, Hilaria Lipoma DO, 5 mg at 09/07/22 0800    atorvastatin (LIPITOR) tablet 10 mg, 10 mg, Oral, Daily, Versa Crigler, MD, 10 mg at 09/07/22 0757    benzonatate (TESSALON PERLES) capsule 100 mg, 100 mg, Oral, TID PRN, Geovanna Cheng MD    budesonide-formoterol [facility]) 160-4 5 mcg/act inhaler 2 puff, 2 puff, Inhalation, BID, Geovanna Cheng MD, 2 puff at 09/07/22 0800    insulin glargine (LANTUS) subcutaneous injection 15 Units 0 15 mL, 15 Units, Subcutaneous, HS, Percilla Files, DO, 15 Units at 09/06/22 2121    insulin lispro (HumaLOG) 100 units/mL subcutaneous injection 1-5 Units, 1-5 Units, Subcutaneous, HS, Geovanna Cheng MD, 3 Units at 09/06/22 2121    insulin lispro (HumaLOG) 100 units/mL subcutaneous injection 1-6 Units, 1-6 Units, Subcutaneous, TID AC, 1 Units at 09/07/22 0746 **AND** Fingerstick Glucose (POCT), , , TID AC, Geovanna Cheng MD    insulin lispro (HumaLOG) 100 units/mL subcutaneous injection 5 Units, 5 Units, Subcutaneous, TID With Meals, Percilla Files, DO, 5 Units at 09/07/22 0746    levalbuterol (XOPENEX) inhalation solution 1 25 mg, 1 25 mg, Nebulization, Q8H PRN, Lisa Queen DO    metoprolol succinate (TOPROL-XL) 24 hr tablet 50 mg, 50 mg, Oral, Daily, Geovanna Cheng MD, 50 mg at 09/07/22 0801    montelukast (SINGULAIR) tablet 10 mg, 10 mg, Oral, HS, Geovanna Cheng MD, 10 mg at 09/07/22 0758    ondansetron (ZOFRAN) injection 4 mg, 4 mg, Intravenous, Q6H PRN, Theo Sherman MD    potassium chloride (K-DUR,KLOR-CON) CR tablet 40 mEq, 40 mEq, Oral, BID, Geovanna Cheng MD, 40 mEq at 09/07/22 0757    [COMPLETED] predniSONE tablet 40 mg, 40 mg, Oral, Daily, 40 mg at 09/04/22 0817 **FOLLOWED BY** [COMPLETED] predniSONE tablet 30 mg, 30 mg, Oral, Daily, 30 mg at 09/07/22 0800 **FOLLOWED BY** [START ON 9/8/2022] predniSONE tablet 20 mg, 20 mg, Oral, Daily **FOLLOWED BY** [START ON 9/11/2022] predniSONE tablet 10 mg, 10 mg, Oral, Daily, Lisa Queen,     sodium chloride 0 9 % inhalation solution 3 mL, 3 mL, Nebulization, Q8H PRN, Perla Patricia MD    umeclidinium bromide (INCRUSE ELLIPTA) 62 5 mcg/inh inhaler AEPB 1 puff, 1 puff, Inhalation, Daily, Geovanna Cheng, MD, 1 puff at 09/07/22 0800    Laboratory Results:  Results from last 7 days   Lab Units 09/07/22  0456 09/06/22  1116 09/05/22  0524 09/03/22  0519 09/02/22  0641 09/01/22  1721   WBC Thousand/uL 9 43  --   --   --  6 96 12 39*   HEMOGLOBIN g/dL 11 3*  --   --   --  12 6 12 3   HEMATOCRIT % 35 0*  --   --   --  37 8 37 3   PLATELETS Thousands/uL 250  --   --   --  212 239   POTASSIUM mmol/L 4 4 4 5 3 6 3 8 3 9 3 5   CHLORIDE mmol/L 104 101 102 99 94* 93*   CO2 mmol/L 27 26 27 31 27 21   BUN mg/dL 40* 41* 41* 39* 28* 20   CREATININE mg/dL 1 57* 1 52* 1 66* 2 06* 2 17* 1 91*   CALCIUM mg/dL 8 9 8 9 8 3 8 7 8 7 8 2*   MAGNESIUM mg/dL 2 8*  --   --   --   --   --        PLEASE NOTE:  This encounter was completed utilizing the [employer] Direct Speech Voice Recognition Software  Grammatical errors, random word insertions, pronoun errors and incomplete sentences are occasional consequences of the system due to software limitations, ambient noise and hardware issues  These may be missed by proof reading prior to affixing electronic signature  Any questions or concerns about the content, text or information contained within the body of this dictation should be directly addressed to the physician for clarification  Please do not hesitate to call me directly if you have any any questions or concerns

## 2022-09-07 NOTE — DISCHARGE SUMMARY
[address] [facility]  Discharge- Walker Cabello 1965, 56 y o  male MRN: [id number]  Unit/Bed#: [facility] 710-01 Encounter: [id number]  Primary Care Provider: Halima Stewart MD   Date and time admitted to hospital: 9/1/2022  5:08 PM    * Chest pain and shortness of breath  Assessment & Plan  · Associated with shortness of breath  Mild wheezing on exam   · this has been ongoing intermittently for quite some time  · Unclear etiology  · rule out ACS -> Nuclear stress test done - has fixed defect, no ischemia but since he has not had LHC before, cardio recs LHC  · Does not appear in volume overload state  · Possible poorly controlled asthma  · ISATU score 1  · troponin normal  · EKG nonischemic  · Last echo showed EF of 55% with grade 1 diastolic dysfunction  · PT OT rec cardiac rehab O  · Since cardiac w/u so far negative, consulted pulmonary & at the same time cardiology placed recs for LHC  ·  nephro consulted precath -> rec holding diuretic on day of LHC & consider IVF pre & post if ok with HF team  · Pulm - prednisone taper, cont triple inhaler, discuss other options outpatient  · Cardiac catheterization nonobstructive CAD    Severe persistent asthma with acute exacerbation  Assessment & Plan  · Worsening shortness of breath with increase clear phlegm production  · Last exacerbation requiring tapering PO Steroids in May 2022  · Follows up with Pulmonary outpatient  · Started on PO prednisone 40 mg daily  · Incentive spirometry respiratory protocol    · Continue home dose Singulair, Xopenex, Symbicort  · Incruse Ellipta daily  · Procalcitonin elevated, hence started on IV rocephin - now stopped  · Pulm consulted as no improvement in symptoms -> rec prednisone taper by 3 days, continue triple inhaler & discuss other options at OP appt  · Wheezing has resolved    Acute renal failure superimposed on stage 3 chronic kidney disease [facility])  Assessment & Plan  Lab Results   Component Value Date    EGFR 48 09/07/2022    EGFR 50 09/06/2022    EGFR 45 09/05/2022    CREATININE 1 57 (H) 09/07/2022    CREATININE 1 52 (H) 09/06/2022    CREATININE 1 66 (H) 09/05/2022     · Baseline creatinine seems between 1 6-1 9  · Currently at baseline  · Continue home dose Bumex  · Trend BMP  · I&O and daily weight    Chronic diastolic heart failure [facility])  Assessment & Plan  Wt Readings from Last 3 Encounters:   09/02/22 (!) 139 kg (306 lb)   08/17/22 (!) 142 kg (312 lb 6 4 oz)   08/08/22 (!) 142 kg (312 lb)     · Clinically appears euvolemic  · Weight is lower than last cardiology appointment on 08/17/2022  · Hyponatremia noted  · No signs of overload  · Continue home dose Bumex 3 mg BID  · Check cardiac BMP  · I&O and daily weight  · Cardiology is following  · Salt and fluid-restricted diet        Type 2 diabetes mellitus, without long-term current use of insulin [facility])  Assessment & Plan  Lab Results   Component Value Date    HGBA1C 5 7 06/16/2022       Recent Labs     09/06/22  1158 09/06/22  1550 09/06/22  2114 09/07/22  0634   POCGLU 266* 296* 301* 152*       Blood Sugar Average: Last 72 hrs:  (P) 944 [id number]   Hold home dose sitagliptin while in hospital   Insulin sliding scale Accu-Cheks  Adjust insulin dosage  Titrate insulin based on blood sugar level  Hypoglycemia protocol  Diabetic diet  Resume oral meds after discharge    Essential hypertension  Assessment & Plan  Continue home dose metoprolol, Bumex and amlodipine with holding parameters      Medical Problems             Resolved Problems  Date Reviewed: 9/7/2022          Resolved    Chills and leukocytosis 9/7/2022     Resolved by  Atif Paniagua DO              Discharging Physician / Practitioner: Atif Paniagua DO  PCP: Melissa Lemos MD  Admission Date:   Admission Orders (From admission, onward)     Ordered        09/01/22 [address] [place] [address]  Once                      Discharge Date: 09/07/22    Consultations During [facility] Stay:  · Cardiology  · Nephrology  · Pulmonology    Procedures Performed:   · Cardiac catheterization, no obstructive CAD    Significant Findings / Test Results:   · As above    Incidental Findings:   ·  none    Test Results Pending at Discharge (will require follow up): · None     Outpatient Tests Requested:  · None    Complications:  None    Reason for Admission:   Chest pain shortness of breath    Hospital Course:       Gerson Lofton is a 56 y o  male patient who originally presented to the hospital on 9/1/2022 due to chest pain shortness for breath  Known history of diastolic heart failure and asthma  Patient went cardiac catheterization with no obstructive CAD  Symptoms improving on IV steroids and bronchodilators    Currently is in stable condition, he will be discharged taper dose of prednisone to follow up with his PCP as an outpatient        Please see above list of diagnoses and related plan for additional information  Condition at Discharge: stable    Discharge Day Visit / Exam:   Subjective:    Patient seen and examined  Comfortable in bed  No event overnight  Symptoms much improved  Vitals: Blood Pressure: 133/71 (09/07/22 0759)  Pulse: 65 (09/07/22 0759)  Temperature: (!) 97 2 °F (36 2 °C) (09/07/22 0759)  Temp Source: Oral (09/01/22 1710)  Respirations: 16 (09/07/22 0759)  Height: 6' (182 9 cm) (09/02/22 1606)  Weight - Scale: (!) 139 kg (306 lb) (09/02/22 1606)  SpO2: 98 % (09/07/22 0759)  Exam:   Physical Exam     Patient is awake alert oriented no acute distress  Lung clear to auscultation bilateral  Heart positive S1-S2 no murmur  Abdomen soft nontender  Lower extremities no edema        Discharge instructions/Information to patient and family:   See after visit summary for information provided to patient and family  Provisions for Follow-Up Care:  See after visit summary for information related to follow-up care and any pertinent home health orders         Disposition:   Home    Planned Readmission: no     Discharge Statement:  I spent 40 minutes discharging the patient  This time was spent on the day of discharge  I had direct contact with the patient on the day of discharge  Greater than 50% of the total time was spent examining patient, answering all patient questions, arranging and discussing plan of care with patient as well as directly providing post-discharge instructions  Additional time then spent on discharge activities  Discharge Medications:  See after visit summary for reconciled discharge medications provided to patient and/or family        **Please Note: This note may have been constructed using a voice recognition system**

## 2022-09-07 NOTE — PLAN OF CARE
Problem: Potential for Falls  Goal: Patient will remain free of falls  Description: INTERVENTIONS:  - Educate patient/family on patient safety including physical limitations  - Instruct patient to call for assistance with activity   - Consult OT/PT to assist with strengthening/mobility   - Keep Call bell within reach  - Keep bed low and locked with side rails adjusted as appropriate  - Keep care items and personal belongings within reach  - Initiate and maintain comfort rounds  - Make Fall Risk Sign visible to staff  - Offer Toileting every 2 Hours, in advance of need  - Initiate/Maintain bed alarm  - Apply yellow socks and bracelet for high fall risk patients  - Consider moving patient to room near nurses station  Outcome: Progressing     Problem: PAIN - ADULT  Goal: Verbalizes/displays adequate comfort level or baseline comfort level  Description: Interventions:  - Encourage patient to monitor pain and request assistance  - Assess pain using appropriate pain scale  - Administer analgesics based on type and severity of pain and evaluate response  - Implement non-pharmacological measures as appropriate and evaluate response  - Consider cultural and social influences on pain and pain management  - Notify physician/advanced practitioner if interventions unsuccessful or patient reports new pain  Outcome: Progressing     Problem: INFECTION - ADULT  Goal: Absence or prevention of progression during hospitalization  Description: INTERVENTIONS:  - Assess and monitor for signs and symptoms of infection  - Monitor lab/diagnostic results  - Monitor all insertion sites, i e  indwelling lines, tubes, and drains  - Monitor endotracheal if appropriate and nasal secretions for changes in amount and color  - Nelson appropriate cooling/warming therapies per order  - Administer medications as ordered  - Instruct and encourage patient and family to use good hand hygiene technique  - Identify and instruct in appropriate isolation precautions for identified infection/condition  Outcome: Progressing  Goal: Absence of fever/infection during neutropenic period  Description: INTERVENTIONS:  - Monitor WBC    Outcome: Progressing     Problem: SAFETY ADULT  Goal: Patient will remain free of falls  Description: INTERVENTIONS:  - Educate patient/family on patient safety including physical limitations  - Instruct patient to call for assistance with activity   - Consult OT/PT to assist with strengthening/mobility   - Keep Call bell within reach  - Keep bed low and locked with side rails adjusted as appropriate  - Keep care items and personal belongings within reach  - Initiate and maintain comfort rounds  - Make Fall Risk Sign visible to staff  - Offer Toileting every 2 Hours, in advance of need  - Initiate/Maintain bed alarm  - Apply yellow socks and bracelet for high fall risk patients  - Consider moving patient to room near nurses station  Outcome: Progressing  Goal: Maintain or return to baseline ADL function  Description: INTERVENTIONS:  -  Assess patient's ability to carry out ADLs; assess patient's baseline for ADL function and identify physical deficits which impact ability to perform ADLs (bathing, care of mouth/teeth, toileting, grooming, dressing, etc )  - Assess/evaluate cause of self-care deficits   - Assess range of motion  - Assess patient's mobility; develop plan if impaired  - Assess patient's need for assistive devices and provide as appropriate  - Encourage maximum independence but intervene and supervise when necessary  - Involve family in performance of ADLs  - Assess for home care needs following discharge   - Consider OT consult to assist with ADL evaluation and planning for discharge  - Provide patient education as appropriate  Outcome: Progressing  Goal: Maintains/Returns to pre admission functional level  Description: INTERVENTIONS:  - Perform BMAT or MOVE assessment daily    - Set and communicate daily mobility goal to care team and patient/family/caregiver  - Collaborate with rehabilitation services on mobility goals if consulted  - Perform Range of Motion 3 times a day  - Reposition patient every 2 hours  - Dangle patient 3 times a day  - Stand patient 3 times a day  - Ambulate patient 3 times a day  - Out of bed to chair 3 times a day   - Out of bed for meals 3 times a day  - Out of bed for toileting  - Record patient progress and toleration of activity level   Outcome: Progressing     Problem: DISCHARGE PLANNING  Goal: Discharge to home or other facility with appropriate resources  Description: INTERVENTIONS:  - Identify barriers to discharge w/patient and caregiver  - Arrange for needed discharge resources and transportation as appropriate  - Identify discharge learning needs (meds, wound care, etc )  - Arrange for interpretive services to assist at discharge as needed  - Refer to Case Management Department for coordinating discharge planning if the patient needs post-hospital services based on physician/advanced practitioner order or complex needs related to functional status, cognitive ability, or social support system  Outcome: Progressing     Problem: Knowledge Deficit  Goal: Patient/family/caregiver demonstrates understanding of disease process, treatment plan, medications, and discharge instructions  Description: Complete learning assessment and assess knowledge base    Interventions:  - Provide teaching at level of understanding  - Provide teaching via preferred learning methods  Outcome: Progressing

## 2022-09-07 NOTE — ASSESSMENT & PLAN NOTE
· Associated with shortness of breath  Mild wheezing on exam   · this has been ongoing intermittently for quite some time  · Unclear etiology  · rule out ACS -> Nuclear stress test done - has fixed defect, no ischemia but since he has not had LHC before, cardio recs LHC  · Does not appear in volume overload state    · Possible poorly controlled asthma  · ISATU score 1  · troponin normal  · EKG nonischemic  · Last echo showed EF of 55% with grade 1 diastolic dysfunction  · PT OT rec cardiac rehab O  · Since cardiac w/u so far negative, consulted pulmonary & at the same time cardiology placed recs for LHC  ·  nephro consulted precath -> rec holding diuretic on day of LHC & consider IVF pre & post if ok with HF team  · Pulm - prednisone taper, cont triple inhaler, discuss other options outpatient  · Cardiac catheterization nonobstructive CAD

## 2022-09-07 NOTE — ASSESSMENT & PLAN NOTE
Lab Results   Component Value Date    EGFR 48 09/07/2022    EGFR 50 09/06/2022    EGFR 45 09/05/2022    CREATININE 1 57 (H) 09/07/2022    CREATININE 1 52 (H) 09/06/2022    CREATININE 1 66 (H) 09/05/2022     · Baseline creatinine seems between 1 6-1 9  · Currently at baseline  · Continue home dose Bumex    · Trend BMP  · I&O and daily weight

## 2022-09-07 NOTE — ASSESSMENT & PLAN NOTE
Date last seen: February 9, 2022 for a well child exam  Date of next visit: none scheduled. Message sent to patient requesting Mother to call the office to schedule a medication check appointment.     Medication Requested:     Outpatient Current Medications as of as of 9/7/2022       Disp Refills Start End    lisdexamfetamine (Vyvanse) 20 MG capsule 30 capsule 0 8/5/2022 9/4/2022    Sig - Route: Take 1 capsule by mouth every morning. - Oral    Class: Eprescribe    Earliest Fill Date: 8/5/2022     PDMP information in blue folder on Dr. Doty's desk to review before prescription authorized.     Lab Results   Component Value Date    HGBA1C 7 0 (H) 11/10/2021       Recent Labs     12/25/21  1036 12/25/21  1543 12/25/21  2113 12/26/21  0638   POCGLU 424* 263* 359* 259*       Blood Sugar Average: Last 72 hrs:  · 12/25- Increase lantus from to 25 units, and humalog 10 units TID with meals  · Avoid hypoglycemia-protocol in place  · Trend BGs, increase insulin as necessary

## 2022-09-07 NOTE — TELEPHONE ENCOUNTER
We discussed the possibility of this at some point if his symptoms persisted  I'd like to see how he does over the next week or two especially since his chest pain symptoms seemed to improve while he was here  IF he starts to have a recurrence of symptoms, we can revisit possibly using Imdur or Ranexa       Thanks  Eneida Haney

## 2022-09-07 NOTE — ASSESSMENT & PLAN NOTE
Lab Results   Component Value Date    HGBA1C 5 7 06/16/2022       Recent Labs     09/06/22  1158 09/06/22  1550 09/06/22  2114 09/07/22  0634   POCGLU 266* 296* 301* 152*       Blood Sugar Average: Last 72 hrs:  (P) 892 [id number]   Hold home dose sitagliptin while in hospital   Insulin sliding scale Accu-Cheks  Adjust insulin dosage  Titrate insulin based on blood sugar level    Hypoglycemia protocol  Diabetic diet  Resume oral meds after discharge

## 2022-09-07 NOTE — RESTORATIVE TECHNICIAN NOTE
Restorative Technician Note      Patient Name: Amanuel Pandya     Note Type: Mobility  Patient Position Upon Consult: Supine  Activity Performed: Ambulated; Dangled; Stood  Assistive Device: Other (Comment) (none)  Education Provided: Yes  Patient Position at End of Consult: Seated edge of bed;  All needs within reach      Blu VERGARA, Restorative Technician, [employer]

## 2022-09-07 NOTE — TELEPHONE ENCOUNTER
Jose Mendez called and left a message a message on HF line requesting a call back  I returned his call  He was just d/c'd from hospitals and has been home 30 mins and has a question regarding adding on an anti-anginal agent? He stated that you mentioned it during one of your visits but nothing was ordered  Patient is going to the pharmacy tomorrow and can p/u at [address] if you decide to order anything  Advised Keon I would call him back if you do order new medication

## 2022-09-08 LAB
C-ANCA TITR SER IF: NORMAL TITER
MYELOPEROXIDASE AB SER IA-ACNC: <0.2 UNITS (ref 0–0.9)
P-ANCA ATYPICAL TITR SER IF: NORMAL TITER
P-ANCA TITR SER IF: NORMAL TITER
PROTEINASE3 AB SER IA-ACNC: <0.2 UNITS (ref 0–0.9)

## 2022-09-08 NOTE — UTILIZATION REVIEW
Notification of Discharge   This is a Notification of Discharge from our facility [address]  Please be advised that this patient has been discharge from our facility  Below you will find the admission and discharge date and time including the patients disposition  UTILIZATION REVIEW CONTACT:  Luther Sanon  Utilization   Network Utilization Review Department  Phone: [phone] x carefully listen to the prompts  All voicemails are confidential   Email: [email or web address]     PHYSICIAN ADVISORY SERVICES:  FOR [id number] REVIEW - MEDICAL NECESSITY DENIAL  Phone: [phone]  Fax: [phone]  Email: [email or web address]     PRESENTATION DATE: 9/1/2022  5:08 PM  OBERVATION ADMISSION DATE:  INPATIENT ADMISSION DATE: 9/1/22  7:21 PM   DISCHARGE DATE: 9/7/2022 10:41 AM  DISPOSITION: Home/Self Care Home/Self Care      IMPORTANT INFORMATION:  Send all requests for admission clinical reviews, approved or denied determinations and any other requests to dedicated fax number below belonging to the campus where the patient is receiving treatment   List of dedicated fax numbers:  [address] DENIALS (Administrative/Medical Necessity) [phone]   1000  [facility] (Maternity/NICU/Pediatrics) [phone]   Desirae Fines [phone]   130 [facility] [phone]   [address] [place] [address] [phone]   2000 [facility] [address] [place] [address],[address] [phone]   [facility]  [phone]   [address] [facility]  24[address] [facility] [address] [facility] [phone]

## 2022-09-08 NOTE — CASE MANAGEMENT
Case Management Progress Note    Patient name Marston Cabot  Location [facility] 710/[facility] 373-43 MRN [id number]  : 1965 Date 2022       LOS (days): 6  Geometric Mean LOS (GMLOS) (days): 2 20  Days to GMLOS:-3 4        OBJECTIVE:        Current admission status: Inpatient  Preferred Pharmacy:   [address]  [address]  [address]  [place] [address]  Phone: [phone] Fax: [phone] [address] [facility], [address]  [address] [place] [address]  Phone: [phone] Fax: [phone]    Primary Care Provider: Laz Ochoa MD    Primary Insurance: [address]  Secondary Insurance:     PROGRESS NOTE:  Post d/c order  A referral had been placed by CM for f/u [facility]  referral  Pt d/c home yesterday 22 but no accepting agencies  Upon review of patient's progress noted, therapy had recommending outpt Cardiac rehab  No other HC needs evaluated  Pt is not homebound  TC to patient to discuss same-left message with request for RTC to discuss same and provide locations of outpt [facility] facilities if needed  Received RTC from pt  He did agree no HC services were needed  Locations of [facility] Rehab facilities discussed  Pt felt that the closest option would be the facility on [address] in [place]  This CM emailed address and phone number to patient and [email or web address]

## 2022-09-09 ENCOUNTER — TELEPHONE (OUTPATIENT)
Dept: NEPHROLOGY | Facility: CLINIC | Age: 57
End: 2022-09-09

## 2022-09-09 DIAGNOSIS — N18.31 ACUTE RENAL FAILURE SUPERIMPOSED ON STAGE 3A CHRONIC KIDNEY DISEASE, UNSPECIFIED ACUTE RENAL FAILURE TYPE (HCC): Primary | ICD-10-CM

## 2022-09-09 DIAGNOSIS — N17.9 ACUTE RENAL FAILURE SUPERIMPOSED ON STAGE 3A CHRONIC KIDNEY DISEASE, UNSPECIFIED ACUTE RENAL FAILURE TYPE (HCC): Primary | ICD-10-CM

## 2022-09-09 NOTE — TELEPHONE ENCOUNTER
Spoke with Patient and schedule appointment for 9/13/22 9am with Dr Radha Betts in the [facility]

## 2022-09-09 NOTE — TELEPHONE ENCOUNTER
[place] for patient letting him know to go for a BMP in one week and we will call him if he needs to do more follow up lab work before his upcoming appt  Asked him to call back if he has any questions

## 2022-09-14 ENCOUNTER — CLINICAL SUPPORT (OUTPATIENT)
Dept: CARDIAC REHAB | Age: 57
End: 2022-09-14
Payer: COMMERCIAL

## 2022-09-14 DIAGNOSIS — R07.9 CHEST PAIN, UNSPECIFIED TYPE: Primary | ICD-10-CM

## 2022-09-14 DIAGNOSIS — R07.9 CHEST PAIN: ICD-10-CM

## 2022-09-14 PROCEDURE — 93797 PHYS/QHP OP CAR RHAB WO ECG: CPT

## 2022-09-14 NOTE — PROGRESS NOTES
Cardiac Rehabilitation Plan of Care   Initial Care Plan          Today's date: 2022   # of Exercise Sessions Completed: 1 eval   Patient name: Yonas Rivero      : 1965  Age: 56 y o  MRN: [id number]  Referring Physician: Andrea Peña DO  Cardiologist: Jason Trent MD    Provider: [facility]  Clinician: Samuel Suarez MS, CEP    Dx:   Encounter Diagnosis   Name Primary?  Chest pain and shortness of breath      Date of onset: 22      SUMMARY OF PROGRESS:  Today is Katheryn Plummer initial evaluation to begin Cardiac Rehab post hospital stay - for chest pain and shortness of breath  Pt has PMH of HFpEF, EF 55%, grade 1 DD, PAF, CKD3, VICKY and CM unspecified  LHC on  revealed no obstructive CAD  The patient does not currently follow a formal exercise program at home  He has resumed all ADLs reporting dyspnea with activity  Depression screening using the PHQ-9 interprets the patient's score of  0  as  0 =No Depression  QUIANA-7 screening tool for anxiety suggests 3  0-4  = Not anxious  When addressed, the patient denies having depression/anxiety  Patient reports excellent social/emotional support  Information to begin using [employer] was provided as well as contact information for counseling through "[employer]  PHQ-9 score will be reassessed in 30 days  The patient is a former smoker (quit 2 years ago)  He has abstained since quitting  Patient admits to 100% medication compliance  Patient reports the following physical limitations: none  The patient completed an initial 6MWT  The patient walked  1032 feet/2  5METs  Resting  /66 with appropriate hemodynamic response to exercise reaching 130/70  Patient denied symptoms during exercise  Telemetry revealed NSR w/ rare PVCs    Patient was counseled on exercise guidelines to achieve a minimum of 150 mins/wk of moderate intensity (RPE 4-6) exercise and encouraged to add 1-2 days of exercise on opposite days of cardiac rehab as tolerated  We discussed current dietary habits and goals of heart healthy eating for lipid management and diabetes management  The patient has T2D, monitors 3x/wk, random glucose 138-140  Takes Januvia     Patient's goals include: Teaching proper breathing techniques, resume normal activity, be able to travel more  The patient's CAD risk factors include:  inactivity, obesity/overweight, hypertension, hyperlipidemia and diabetes  His education will focus on lifestyle modification/education specific to His needs  Patient will attend group education classes on heart healthy eating, reading food labels, stress management, risk factor reduction, understanding heart disease and common heart medications  Patient will attend 35 monitored exercise sessions, 3x/wk for 12-18 weeks beginning 22         Medication compliance: Yes   Comments: Pt reports to be compliant with medications  Fall Risk: Low   Comments: Ambulates with a steady gait with no assist device and Denies a fall in the past 6 months    EKG Interpretation: NSR w/ rare PVCs      EXERCISE ASSESSMENT and PLAN    Exercise Prescription:      Frequency: 3 days/week   Supplement with home exercise 2+ days/wk as tolerated       Minutes: 30         METS: 2 0-3 0            HR: RHR+30   RPE: 4-6         Modalities: Treadmill, UBE, Lifecycle and NuStep      30 Day Goals for Exercise Progression:    Frequency: 3 days/week of cardiac rehab       Supplement with home exercise 2+ days/wk as tolerated    Minutes: 30-40                              >150 mins/wk of moderate intensity exercise   METS: 3 0-4 5   HR: RHR+30    RPE: 4-6   Modalities: Treadmill, Airdyne bike, UBE, Lifecycle and NuStep    Strength trainin-3 days / week  12-15 repetitions  1-2 sets per modality   Will be added following 2-3 weeks of monitored exercise sessions   Modalities: Leg Press, Chest Press, Pull Downs, Lateral Raise and Seated Row    Home Exercise: none    Goals: 10% improvement in functional capacity - based on max METs achieved in fitness assessment, Reduced dyspnea with physical activity  0-1/10, improved DASI score by 10%, Increase in exercise capacity by 40% - based on peak METs tolerated in cardiac rehab exercise session, Exercise 5 days/wk, >150mins/wk of moderate intensity exercise, Improved 6MWT distance by 10%, Attend Rehab regularly, Start a walking program and start a home exercise program    Progression Toward Goals:  Reviewed Pt goals and determined plan of care, Will continue to educate and progress as tolerated  Education: benefit of exercise for CAD risk factors, AHA guidelines to achieve >150 mins/wk of moderate exercise, RPE scale and physical activity/exercise in extreme weather conditions   Plan:home exercise 30+ mins 2 days opposite CR and Education class: Risk Factors for Heart Disease  Readiness to change: Preparation:  (Getting ready to change)       NUTRITION ASSESSMENT AND PLAN    Weight control:    Starting weight: 230   Current weight:     Waist circumference:    Starting:    Current:      Diabetes: T2D, checks himself 3x/wk, random glucose 138-140  Takes januvia  A1c: 5 7  last measured:     Lipid management: Discussed diet and lipid management, Last lipid profile 09/05/2022  Chol 163    HDL 45  LDL 90 and Pt has a script for repeat lipid profile  Goals:LDL <100, HDL >40, TRG <150, CHOL <200, decreased body fat% <25%   (M), Improved Rate Your Plate score  >08, 6 4-0%  wt loss, choose lean meat (93-95%), eliminate processed meats, increase intake of meatless meals, use low fat dairy, reduce cheese intake or use reduced-fat, eat 3 or more servings of whole grains a day, Eat 4-5 cups of fruits and vegetables daily and choose low sodium processed foods    Progression Toward Goals: Reviewed Pt goals and determined plan of care, Will continue to educate and progress as tolerated      Education: heart healthy eating  low sodium diet  hydration  nutrition for  lipid management  Plan: Education class: Reading Food Labels, Education Class: Heart Healthy Eating, switch to low fat cheeses, replace refined grain bread with whole grain bread, switch to skim or 1% milk and monitor home blood glucose  Readiness to change: Contemplation:  (Acknowledging that there is a problem but not yet ready or sure of wanting to make a change)      PSYCHOSOCIAL ASSESSMENT AND PLAN    Emotional:  Depression assessment:  PHQ-9 = 0  0 =No Depression            Anxiety measure:  QUIANA-7 = 3  0-4  = Not anxious  Self-reported stress level:  2  Social support: Patient reports excellent emotional/social support from wife and family    Goals:  Social Support in [place] Score < 3, Daily Activity in [facility] Score < 3, Overall Health in [facility] Score < 3 and increased energy    Progression Toward Goals: Reviewed Pt goals and determined plan of care, Will continue to educate and progress as tolerated      Education: benefits of a positive support system and stress management techniques  Plan: Class: Stress and Your Health, Class: Relaxation, Exercise and Enjoy family  Readiness to change: Contemplation:  (Acknowledging that there is a problem but not yet ready or sure of wanting to make a change)      OTHER CORE COMPONENTS     Tobacco:   Social History     Tobacco Use   Smoking Status Former Smoker    Packs/day: 0 01    Years: 11 00    Pack years: 0 11    Types: Pipe, Cigars    Start date:     Quit date: 2020    Years since quittin 7   Smokeless Tobacco Never Used   Tobacco Comment    cigar once a day       Tobacco Use Intervention:   Pt quit 2020   and has abstained    Anginal Symptoms:  stabbing chest pain   NTG use: No prescription    Blood pressure:    Restin//70   Exercise: 130/70    Goals: consistent BP < 130/80, reduced dietary sodium <2300mg, moderate intensity exercise >150 mins/wk, medication compliance, Abstain from smoking and reduced angina     Progression Toward Goals: Reviewed Pt goals and determined plan of care, Will continue to educate and progress as tolerated      Education:  low sodium diet and HTN  Plan: Class: Understanding Heart Disease, Class: Common Heart Medications, Complete abstention from smoking, engage in regular exercise and check labels for sodium content  Readiness to change: Preparation:  (Getting ready to change)

## 2022-09-14 NOTE — PROGRESS NOTES
CARDIAC REHAB ASSESSMENT    Today's date: 2022  Patient name: Marston Cabot     : 1965       MRN: [id number]  PCP: Laz Ochoa MD  Referring Physician: Teto Pina DO  Cardiologist: Dr Melissa Drummond  Surgeon:   Dx:   Encounter Diagnosis   Name Primary?  Chest pain and shortness of breath        Date of onset: 22  Cultural needs:     Weight    Wt Readings from Last 1 Encounters:   22 (!) 139 kg (306 lb)      Height:   Ht Readings from Last 1 Encounters:   22 6' (1 829 m)     Medical History:   Past Medical History:   Diagnosis Date    Acute gastritis without hemorrhage     last assessed 3/24/17    Asthma     Bilateral leg edema 2020    [facility])     Daytime sleepiness 2019    Diabetes mellitus ([facility] [address] )     Dyspnea on exertion 10/11/2021    Edema of both feet 2020    Gastric bypass status for obesity     Hyperlipidemia     Hypertension     Hypokalemia 2021    Impaired fasting glucose     last assessed 5/10/17    Knee sprain, bilateral 2018    Leg cramps 2022    Obesity     Viral gastroenteritis     last assessed 16       Physical Limitations: none     Fall Risk: Low   Comments: Ambulates with a steady gait with no assist device and Denies a fall in the past 6 months    Anginal Equivalent: Excessive Diaphoresis and Chest Pain   NTG use: No prescription stabbing pain     Risk Factors   Cholesterol: Yes  Smoking: Former user  HTN: Yes  DM: Type 2   No insulin  3x/wk checks himself   138-140 BS  Obesity: Yes   Inactivity: No  Stress:  perceived  stress: 2/10   Stressors:    Goals for Stress Management:Practice Relaxation Techniques and Exercise    Family History:  Family History   Problem Relation Age of Onset    Stroke Mother     Hypertension Father     Cancer Father     COPD Father        Allergies: Azithromycin and Bactrim [sulfamethoxazole-trimethoprim]  ETOH:   Social History     Substance and Sexual Activity   Alcohol Use Not Currently    Alcohol/week: 2 0 standard drinks    Types: 2 Shots of liquor per week    Comment: social         Current Medications:   Current Outpatient Medications   Medication Sig Dispense Refill    Accu-Chek FastClix Lancets MISC Test blood sugar twice daily 200 each 3    albuterol (PROVENTIL HFA,VENTOLIN HFA) 90 mcg/act inhaler INHALE 2 PUFFS EVERY 4 (FOUR) HOURS AS NEEDED FOR WHEEZING OR SHORTNESS OF BREATH 18 g 3    amLODIPine (NORVASC) 5 mg tablet Take 1 tablet (5 mg total) by mouth daily 30 tablet 0    apixaban (Eliquis) 5 mg Take 1 tablet (5 mg total) by mouth 2 (two) times a day 60 tablet 5    atorvastatin (LIPITOR) 10 mg tablet Take 1 tablet (10 mg total) by mouth in the morning   30 tablet 5    benzonatate (TESSALON PERLES) 100 mg capsule Take 1 capsule (100 mg total) by mouth 3 (three) times a day as needed (for cough) 30 capsule 0    Blood Glucose Monitoring Suppl (Accu-Chek Guide) w/Device KIT Use 2 (two) times a day Test blood sugar twice daily 1 kit 0    bumetanide (BUMEX) 1 mg tablet Take 3 tablets (3 mg total) by mouth 2 (two) times a day 180 tablet 0    levalbuterol (Xopenex) 1 25 mg/3 mL nebulizer solution Take 3 mL (1 25 mg total) by nebulization 3 (three) times a day (Patient taking differently: Take 1 25 mg by nebulization as needed) 270 mL 3    metoprolol succinate (TOPROL-XL) 50 mg 24 hr tablet Take 1 tablet (50 mg total) by mouth daily 90 tablet 1    montelukast (SINGULAIR) 10 mg tablet Take 1 tablet (10 mg total) by mouth daily at bedtime 90 tablet 1    potassium chloride (K-DUR,KLOR-CON) 20 mEq tablet Take 2 tablets (40 mEq total) by mouth 2 (two) times a day 120 tablet 5    predniSONE 10 mg tablet Take 1 tablet (10 mg total) by mouth daily 20 mg daily x3 days then 10 mg daily x3 days then stop 10 tablet 0    sitaGLIPtin (Januvia) 100 mg tablet Take 1/2 tab  daily 30 tablet 5    Symbicort 160-4 5 MCG/ACT inhaler 2 PUFFS BY MOUTH TWICE DAILY , RINSE MOUTH THEN SPIT AFTER USE 30 6 g 2    tiotropium (Spiriva Respimat) 1 25 MCG/ACT AERS inhaler Inhale 2 puffs daily 4 g 3     No current facility-administered medications for this visit  Current Functional Status  Occupation:  for 20 yrs, 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   Recreation: 13year old daughter, narayan   ADLs:limited by Dyspnea  [place]: limited by Dyspnea  Exercise: walking outdoors   Other:     Patient Specific Goals:  Teaching proper breathing techniques, resume normal activity, be able to travel more     Short Term Program Goals: dietary modifications increased strength improved energy/stamina with ADLs exercise 120-150 mins/wk improved BG control wt loss 1-2 ppw    Long Term Goals: increased maximal walking duration  Improved [facility] Activity Status score  Improved functional capacity  Improved Quality of Life - [facility] score reduced  Improved lipid profile  Reduced body fat%  Reduced waist circumference  Improved A1c  Improved fasting glucose  improved Rate Your Plate Score    Ability to reach goals/rehabilitation potential:  Excellent    Projected return to function: 12 weeks  Objective tests: 6 MWT      Nutritional   Reviewed details of Rate your Plate  Discussed key elements of heart healthy eating  Reviewed patient goals for dietary modifications and their clinical implications  Reviewed most recent lipid profile  Goals for dietary modification: choose lean cuts of meat  low fat ground meat and poultry  more meatless meals  low fat dairy   reduced fat cheese  increase whole grains  increase fruits and vegetables  low sodium   Cut out all salt, does not cook with salt and no table salt  Encouraged smoothies  Not a lot of snacking  2000ml of water a day         Emotional/Social  Patient reports he/she is coping well with good social support and denies depression or anxiety    Marital status:     Domestic Violence Screening: No    Comments: SOB with exertion, no CP since weekend of 7th doing stairs in hospital, goes away with rest  Cpap machine is recalled  Has been sleeping without and feels rested and better when hes not wearing it  Nebulizer treatment as needed w/ levobuterol

## 2022-09-15 ENCOUNTER — OFFICE VISIT (OUTPATIENT)
Dept: FAMILY MEDICINE CLINIC | Facility: CLINIC | Age: 57
End: 2022-09-15
Payer: COMMERCIAL

## 2022-09-15 VITALS
RESPIRATION RATE: 16 BRPM | TEMPERATURE: 99.6 F | DIASTOLIC BLOOD PRESSURE: 72 MMHG | HEART RATE: 80 BPM | WEIGHT: 315 LBS | HEIGHT: 72 IN | SYSTOLIC BLOOD PRESSURE: 126 MMHG | BODY MASS INDEX: 42.66 KG/M2 | OXYGEN SATURATION: 97 %

## 2022-09-15 DIAGNOSIS — R07.9 CHEST PAIN, UNSPECIFIED TYPE: Primary | ICD-10-CM

## 2022-09-15 DIAGNOSIS — I48.0 PAROXYSMAL ATRIAL FIBRILLATION (HCC): Chronic | ICD-10-CM

## 2022-09-15 DIAGNOSIS — N18.32 TYPE 2 DIABETES MELLITUS WITH STAGE 3B CHRONIC KIDNEY DISEASE, WITHOUT LONG-TERM CURRENT USE OF INSULIN (HCC): Chronic | ICD-10-CM

## 2022-09-15 DIAGNOSIS — J45.51 SEVERE PERSISTENT ASTHMA WITH ACUTE EXACERBATION: ICD-10-CM

## 2022-09-15 DIAGNOSIS — N18.31 ACUTE RENAL FAILURE SUPERIMPOSED ON STAGE 3A CHRONIC KIDNEY DISEASE, UNSPECIFIED ACUTE RENAL FAILURE TYPE (HCC): ICD-10-CM

## 2022-09-15 DIAGNOSIS — E11.22 TYPE 2 DIABETES MELLITUS WITH STAGE 3B CHRONIC KIDNEY DISEASE, WITHOUT LONG-TERM CURRENT USE OF INSULIN (HCC): Chronic | ICD-10-CM

## 2022-09-15 DIAGNOSIS — I10 ESSENTIAL HYPERTENSION: Chronic | ICD-10-CM

## 2022-09-15 DIAGNOSIS — I50.32 CHRONIC DIASTOLIC HEART FAILURE (HCC): Chronic | ICD-10-CM

## 2022-09-15 DIAGNOSIS — E78.2 MIXED HYPERLIPIDEMIA: Chronic | ICD-10-CM

## 2022-09-15 DIAGNOSIS — N17.9 ACUTE RENAL FAILURE SUPERIMPOSED ON STAGE 3A CHRONIC KIDNEY DISEASE, UNSPECIFIED ACUTE RENAL FAILURE TYPE (HCC): ICD-10-CM

## 2022-09-15 PROCEDURE — 99495 TRANSJ CARE MGMT MOD F2F 14D: CPT | Performed by: FAMILY MEDICINE

## 2022-09-15 PROCEDURE — 1111F DSCHRG MED/CURRENT MED MERGE: CPT | Performed by: FAMILY MEDICINE

## 2022-09-15 NOTE — PROGRESS NOTES
Chief Complaint   Patient presents with    Transition of Care Management     Chest pain and SOB     Health Maintenance   Topic Date Due    Pneumococcal Vaccine: Pediatrics (0 to 5 Years) and At-Risk Patients (6 to 59 Years) (1 - PCV) Never done    HIV Screening  Never done    Colorectal Cancer Screening  Never done    URINE MICROALBUMIN  04/23/2020    COVID-19 Vaccine (3 - Booster for Pfizer series) 11/05/2021    Influenza Vaccine (1) 09/01/2022    Diabetic Foot Exam  10/13/2022    DM Eye Exam  11/04/2022    BMI: Followup Plan  01/11/2023    HEMOGLOBIN A1C  12/16/2022    Depression Screening  06/16/2023    Annual Physical  06/16/2023    BMI: Adult  09/15/2023    DTaP,Tdap,and Td Vaccines (2 - Td or Tdap) 06/09/2028    Hepatitis C Screening  Completed    HIB Vaccine  Aged Out    Hepatitis B Vaccine  Aged Out    IPV Vaccine  Aged Out    Hepatitis A Vaccine  Aged Out    Meningococcal ACWY Vaccine  Aged Out    HPV Vaccine  Aged Out     Assessment & Plan     Patient presents for TCM visit  He was admitted to [address] [place] [address] 9/1/22-9/7/22  1  Chest pain, unspecified type  Assessment & Plan:  EKG showed no ischemic changes  Patient underwent cardiac catheterization which showed no obstructive coronary disease  Patient reports no chest pain, SOB since hospital discharge  Follow-up with cardiology Dr Carter Matthews on 9/21/22  2  Severe persistent asthma with acute exacerbation  Assessment & Plan:  Symptoms improved  Patient completed Prednisone taper yesterday  Continue to use Spiriva Respimat inhaler, Symbicort inhaler  Follow-up with pulmonology Dr Beata Bowman tomorrow  3  Chronic diastolic heart failure [facility])  Assessment & Plan:  Wt Readings from Last 3 Encounters:   09/15/22 (!) 146 kg (322 lb)   09/02/22 (!) 139 kg (306 lb)   08/17/22 (!) 142 kg (312 lb 6 4 oz)     Patient is euvolemic on exam     Continue Bumex 3 mg twice daily  Follow a low-sodium diet  Check daily weights  Follow up with cardiology Dr Lucinda Fernandes next week  4  Essential hypertension  Assessment & Plan:  BP remains stable  Continue Amlodipine 5 mg daily, Metoprolol ER 50 mg daily  Continue to monitor blood pressure at home  5  Acute renal failure superimposed on stage 3a chronic kidney disease, unspecified acute renal failure type [facility])  Assessment & Plan:  Lab Results   Component Value Date    EGFR 48 09/07/2022    EGFR 50 09/06/2022    EGFR 45 09/05/2022    CREATININE 1 57 (H) 09/07/2022    CREATININE 1 52 (H) 09/06/2022    CREATININE 1 66 (H) 09/05/2022     Blood work done on September 7, 2022 showed creatinine 1 57 - back to baseline  Follow-up with cardiology Dr Jaleesa Harper tomorrow  6  Type 2 diabetes mellitus with stage 3b chronic kidney disease, without long-term current use of insulin [facility])  Assessment & Plan:    Lab Results   Component Value Date    HGBA1C 5 7 06/16/2022     Blood sugar at home ranges 130-135  Patient reports no hypoglycemic episodes  Continue Januvia 100 mg -1/2 tab  daily    Orders:  -     Microalbumin / creatinine urine ratio ([facility]); Future    7  Paroxysmal atrial fibrillation [facility])  Assessment & Plan:  Patient is asymptomatic  Continue beta-blocker, anticoagulation with Eliquis 5 mg twice daily  Follow up with cardiology  8  Mixed hyperlipidemia  Assessment & Plan:  Continue Atorvastatin 10 mg daily  Keep follow-up office visit is scheduled in December  Subjective     Transitional Care Management Review:   Fernando Sher is a 56 y o  male here for TCM follow up       During the TCM phone call patient stated:  TCM Call     Date and time call was made  9/8/2022 12:15 PM    Hospital care reviewed  Records reviewed    Patient was hospitialized at  [facility]    Date of Admission  09/01/22    Date of discharge  09/07/22    Diagnosis  Chest pain and shortness of breath    Disposition  Home    Were the patients medications reviewed and updated  Yes    Current Symptoms  None    Shortness of breath severity  Moderate      TCM Call     Post hospital issues  None    Should patient be enrolled in anticoag monitoring? No    Scheduled for follow up? Yes    Patients specialists  Cardiologist    Cardiologist name  Celestia Bosworth, MD    Cardiologist contact #  [phone]    Did you obtain your prescribed medications  Yes    Do you need help managing your prescriptions or medications  No    Is transportation to your appointment needed  No    I have advised the patient to call PCP with any new or worsening symptoms  [address] or [facility] other    Support System  Spouse    The type of support provided  None    Do you have social support  Yes, some    Are you recieving any outpatient services  No    Are you recieving home care services  No    Are you using any community resources  No    Current waiver services  No    Have you fallen in the last 12 months  No    Interperter language line needed  No    Counseling  Patient        HPI     Patient presents for TCM visit  He was admitted to [address] [place] [address] 9/1/22-9/7/22  Patient originally presented to the hospital due to chest pain and shortness of breath  He underwent cardiac catheterization which showed no obstructive coronary disease  Patient was seen in consultation by pulmonology, cardiology, nephrology  Symptoms improved with IV steroids and bronchodilators  Reviewed hospital records, test results, discharge medications with patient  Patient was discharged home on Prednisone taper which he completed yesterday  Reports significant improvement in shortness of breath  Denies cough, wheezing  Reports no chest pain  Patient has follow-up office visit tomorrow with pulmonology Dr Ulises Caballero, nephrology Dr Radha Betts, will start cardiac rehab      Type 2 DM- currently taking Januvia 100 mg -1/2  tablet daily  Blood sugar at home ranges 130-135  Reports no hypoglycemic episodes  HTN / CHF - blood pressure remains stable  BP at home 126/ 80's  Patient has follow-up office visit with cardiology Dr Sakina Narvaez on September 21, 2022  Blood work done upon discharge home on September 7, 2022 showed creatinine 1 57 - at baseline  Potassium 4 4  Glucose 158  ROS: No fatigue  No ST, earache, nasal congestion  Pulmonary: denies cough, SOB, wheezing, chest tightness  Cardio: denies chest pain, leg edema  No heart palpitations  GI: no abdominal pain, N/V/D  Musculoskeletal: no joint pains  Skin: no rashes  Neuro: denies headache, dizziness  Physical Exam  Vitals and nursing note reviewed  Constitutional:       Appearance: Normal appearance  He is obese  HENT:      Head: Normocephalic and atraumatic  Nose: No congestion  Eyes:      Conjunctiva/sclera: Conjunctivae normal       Pupils: Pupils are equal, round, and reactive to light  Neck:      Vascular: No carotid bruit  Cardiovascular:      Rate and Rhythm: Normal rate and regular rhythm  Heart sounds: No murmur heard  Pulmonary:      Effort: Pulmonary effort is normal       Breath sounds: Normal breath sounds  No wheezing  Abdominal:      General: There is no distension  Palpations: Abdomen is soft  Tenderness: There is no abdominal tenderness  Musculoskeletal:         General: No swelling or tenderness  Normal range of motion  Cervical back: Normal range of motion and neck supple  Right lower leg: No edema  Left lower leg: No edema  Skin:     General: Skin is warm and dry  Findings: No rash  Neurological:      Mental Status: He is alert     Psychiatric:         Mood and Affect: Mood normal

## 2022-09-16 ENCOUNTER — OFFICE VISIT (OUTPATIENT)
Dept: NEPHROLOGY | Facility: CLINIC | Age: 57
End: 2022-09-16
Payer: COMMERCIAL

## 2022-09-16 ENCOUNTER — OFFICE VISIT (OUTPATIENT)
Dept: PULMONOLOGY | Facility: CLINIC | Age: 57
End: 2022-09-16
Payer: COMMERCIAL

## 2022-09-16 VITALS
WEIGHT: 315 LBS | HEIGHT: 73 IN | HEART RATE: 88 BPM | BODY MASS INDEX: 41.75 KG/M2 | TEMPERATURE: 96.6 F | OXYGEN SATURATION: 98 % | DIASTOLIC BLOOD PRESSURE: 60 MMHG | SYSTOLIC BLOOD PRESSURE: 110 MMHG

## 2022-09-16 VITALS
WEIGHT: 315 LBS | SYSTOLIC BLOOD PRESSURE: 114 MMHG | HEIGHT: 72 IN | DIASTOLIC BLOOD PRESSURE: 62 MMHG | BODY MASS INDEX: 42.66 KG/M2

## 2022-09-16 DIAGNOSIS — J45.51 SEVERE PERSISTENT ASTHMA WITH ACUTE EXACERBATION: Primary | ICD-10-CM

## 2022-09-16 DIAGNOSIS — I50.32 CHRONIC DIASTOLIC HEART FAILURE (HCC): Chronic | ICD-10-CM

## 2022-09-16 DIAGNOSIS — N18.32 TYPE 2 DIABETES MELLITUS WITH STAGE 3B CHRONIC KIDNEY DISEASE, WITHOUT LONG-TERM CURRENT USE OF INSULIN (HCC): Chronic | ICD-10-CM

## 2022-09-16 DIAGNOSIS — J45.51 SEVERE PERSISTENT ASTHMA WITH ACUTE EXACERBATION: ICD-10-CM

## 2022-09-16 DIAGNOSIS — I10 ESSENTIAL HYPERTENSION: Chronic | ICD-10-CM

## 2022-09-16 DIAGNOSIS — G47.33 OSA (OBSTRUCTIVE SLEEP APNEA): Chronic | ICD-10-CM

## 2022-09-16 DIAGNOSIS — E78.2 MIXED HYPERLIPIDEMIA: Chronic | ICD-10-CM

## 2022-09-16 DIAGNOSIS — R07.9 CHEST PAIN, UNSPECIFIED TYPE: ICD-10-CM

## 2022-09-16 DIAGNOSIS — E66.01 MORBID OBESITY DUE TO EXCESS CALORIES (HCC): Chronic | ICD-10-CM

## 2022-09-16 DIAGNOSIS — E11.22 TYPE 2 DIABETES MELLITUS WITH STAGE 3B CHRONIC KIDNEY DISEASE, WITHOUT LONG-TERM CURRENT USE OF INSULIN (HCC): Chronic | ICD-10-CM

## 2022-09-16 DIAGNOSIS — N18.31 STAGE 3A CHRONIC KIDNEY DISEASE (HCC): Primary | ICD-10-CM

## 2022-09-16 PROBLEM — N17.9 ACUTE RENAL FAILURE SUPERIMPOSED ON STAGE 3 CHRONIC KIDNEY DISEASE (HCC): Status: RESOLVED | Noted: 2021-12-23 | Resolved: 2022-09-16

## 2022-09-16 PROBLEM — N18.30 ACUTE RENAL FAILURE SUPERIMPOSED ON STAGE 3 CHRONIC KIDNEY DISEASE (HCC): Status: RESOLVED | Noted: 2021-12-23 | Resolved: 2022-09-16

## 2022-09-16 PROCEDURE — 99214 OFFICE O/P EST MOD 30 MIN: CPT | Performed by: INTERNAL MEDICINE

## 2022-09-16 NOTE — ASSESSMENT & PLAN NOTE
Patient encouraged to try to loosely through combination of dietary modification and self paced exercise

## 2022-09-16 NOTE — ASSESSMENT & PLAN NOTE
Symptoms improved  Patient completed Prednisone taper yesterday  Continue to use Spiriva Respimat inhaler, Symbicort inhaler  Follow-up with pulmonology Dr Vasile Lindsay tomorrow

## 2022-09-16 NOTE — ASSESSMENT & PLAN NOTE
Patient is asymptomatic  Continue beta-blocker, anticoagulation with Eliquis 5 mg twice daily  Follow up with cardiology

## 2022-09-16 NOTE — ASSESSMENT & PLAN NOTE
EKG showed no ischemic changes  Patient underwent cardiac catheterization which showed no obstructive coronary disease  Patient reports no chest pain, SOB since hospital discharge  Follow-up with cardiology Dr Anna Radford on 9/21/22

## 2022-09-16 NOTE — ASSESSMENT & PLAN NOTE
Lab Results   Component Value Date    EGFR 48 09/07/2022    EGFR 50 09/06/2022    EGFR 45 09/05/2022    CREATININE 1 57 (H) 09/07/2022    CREATININE 1 52 (H) 09/06/2022    CREATININE 1 66 (H) 09/05/2022     Blood work done on September 7, 2022 showed creatinine 1 57 - back to baseline  Follow-up with cardiology Dr Jc Donato tomorrow

## 2022-09-16 NOTE — ASSESSMENT & PLAN NOTE
Lab Results   Component Value Date    HGBA1C 5 7 06/16/2022     Blood sugar at home ranges 130-135  Patient reports no hypoglycemic episodes      Continue Januvia 100 mg -1/2 tab  daily

## 2022-09-16 NOTE — ASSESSMENT & PLAN NOTE
Wt Readings from Last 3 Encounters:   09/15/22 (!) 146 kg (322 lb)   09/02/22 (!) 139 kg (306 lb)   08/17/22 (!) 142 kg (312 lb 6 4 oz)     Patient is euvolemic on exam     Continue Bumex 3 mg twice daily  Follow a low-sodium diet  Check daily weights  Follow up with cardiology Dr Leann Calvin next week

## 2022-09-16 NOTE — PROGRESS NOTES
Pulmonary Follow Up Note   Dagmar Jeronimo 56 y o  male MRN: [id number]  9/16/2022      Assessment:    VICKY (obstructive sleep apnea)  Previously compliant  He is waiting on a new CPAP machine  Severe persistent asthma with acute exacerbation  Recent admission for chest pain, shortness of breath complicated by acute exacerbation of asthma  He is doing very well today  Lungs are clear on examination  He completed steroid taper yesterday    Plan  Continue Symbicort, Spiriva, Singulair, albuterol p r n  Previously had peripheral eosinophilia though not seen on most recent labs which may have been due to steroid use  Will repeat CBC, total IgE in 6 weeks  May be candidate for biological treatment   He has received 2 COVID-19 vaccinations  He is reluctant to receive a 3rd because of getting sick afterwards   He typically does not receive influenza vaccination  I did recommend that he receive a   Are also recommended pneumonia vaccination which he is considering   He will follow-up in approximately 3 months    Chronic diastolic heart failure ([facility] [address] )  Wt Readings from Last 3 Encounters:   09/16/22 (!) 145 kg (319 lb)   09/16/22 (!) 144 kg (318 lb)   09/15/22 (!) 146 kg (322 lb)     Recent admission  Cardiac catheterization performed without obstructive component  He is currently on diuretic therapy checks his weights daily  Continue diuresis  Continue follow-up with Cardiology and also Nephrology  Morbid obesity due to excess calories [facility])  Patient encouraged to try to loosely through combination of dietary modification and self paced exercise  Plan:    Diagnoses and all orders for this visit:    Severe persistent asthma with acute exacerbation  -     CBC and differential; Future  -     IgE; Future    VICKY (obstructive sleep apnea)    Morbid obesity due to excess calories (HCC)    Chest pain, unspecified type        No follow-ups on file      History of Present Illness   HPI:  Dagmar Jeronimo is a 56 y o  male history of systolic congestive heart failure, morbid obesity (status post gastric bypass 2004), obstructive sleep apnea (on CPAP), moderate persistent asthma, psoriasis, type 2 diabetes  He is here today for follow-up and hospital follow-up    Presents today for routine follow-up  He was hospitalized proximally 2 weeks ago due to chest pain, wheezing and shortness of breath  He underwent left heart catheterization that was unremarkable/ without obstructive coronary disease  Daniel Lara He was treated for asthma exacerbation  He completed his steroid taper yesterday  Current inhalers include Advair, albuterol and Incruse  States overall he is feeling much better since he left the hospital   He is not experiencing any recurrent wheezing, chest tightness or shortness of breath  His leg edema has resolved and he has no orthopnea  Takes Bumex daily  He checks his weight every day and it has not fluctuated by more than 5 lb  He is participating in cardiac rehab  His currently not using PAP therapy since he is waiting for a new device  Review of Systems   Constitutional: Negative for chills, fatigue and fever  HENT: Negative for congestion, nosebleeds, postnasal drip, rhinorrhea, sinus pressure and sore throat  Eyes: Negative for discharge, redness and itching  Respiratory: Negative for cough, choking, chest tightness, shortness of breath, wheezing and stridor  Cardiovascular: Negative for chest pain, palpitations and leg swelling  Gastrointestinal: Negative for blood in stool  Genitourinary: Negative for difficulty urinating and dysuria  Musculoskeletal: Negative for arthralgias, joint swelling and myalgias  Skin: Negative for color change and rash  Neurological: Negative for light-headedness and headaches  Hematological: Negative for adenopathy         Historical Information   Past Medical History:   Diagnosis Date    Acute gastritis without hemorrhage     last assessed 3/24/17    Asthma     Bilateral leg edema 2/19/2020    CHF (congestive heart failure) (HCC)     Daytime sleepiness 7/17/2019    Diabetes mellitus (HCC)     Dyspnea on exertion 10/11/2021    Edema of both feet 1/23/2020    Gastric bypass status for obesity     Hyperlipidemia     Hypertension     Hypokalemia 11/14/2021    Impaired fasting glucose     last assessed 5/10/17    Knee sprain, bilateral 6/14/2018    Leg cramps 6/16/2022    Obesity     Viral gastroenteritis     last assessed 11/4/16     Past Surgical History:   Procedure Laterality Date    CARDIAC CATHETERIZATION N/A 3/9/2022    Procedure: CARDIAC RHC W/ PRESSURE SENSOR;  Surgeon: Steve Roa MD;  Location: [facility]; Service: Cardiology    CARDIAC CATHETERIZATION N/A 9/6/2022    Procedure: Cardiac catheterization;  Surgeon: Cici Sepulveda DO;  [facility]: [facility]; Service: Cardiology    CARDIAC CATHETERIZATION N/A 9/6/2022    Procedure: Cardiac Coronary Angiogram;  Surgeon: Cici Sepulveda DO;  Location: [facility];   Service: Cardiology    CARPAL TUNNEL RELEASE      bilateral    GASTRIC BYPASS  2004    with han en y    HERNIA REPAIR      ventral inscisional    TONSILLECTOMY       Family History   Problem Relation Age of Onset    Stroke Mother     Hypertension Father     Cancer Father     COPD Father          Meds/Allergies     Current Outpatient Medications:     Accu-Chek FastClix Lancets MISC, Test blood sugar twice daily, Disp: 200 each, Rfl: 3    albuterol (PROVENTIL HFA,VENTOLIN HFA) 90 mcg/act inhaler, INHALE 2 PUFFS EVERY 4 (FOUR) HOURS AS NEEDED FOR WHEEZING OR SHORTNESS OF BREATH, Disp: 18 g, Rfl: 3    amLODIPine (NORVASC) 5 mg tablet, Take 1 tablet (5 mg total) by mouth daily, Disp: 30 tablet, Rfl: 0    apixaban (Eliquis) 5 mg, Take 1 tablet (5 mg total) by mouth 2 (two) times a day, Disp: 60 tablet, Rfl: 5    atorvastatin (LIPITOR) 10 mg tablet, Take 1 tablet (10 mg total) by mouth in the morning , Disp: 30 tablet, Rfl: 5    benzonatate (TESSALON PERLES) 100 mg capsule, Take 1 capsule (100 mg total) by mouth 3 (three) times a day as needed (for cough), Disp: 30 capsule, Rfl: 0    Blood Glucose Monitoring Suppl (Accu-Chek Guide) w/Device KIT, Use 2 (two) times a day Test blood sugar twice daily, Disp: 1 kit, Rfl: 0    bumetanide (BUMEX) 1 mg tablet, Take 3 tablets (3 mg total) by mouth 2 (two) times a day, Disp: 180 tablet, Rfl: 0    levalbuterol (Xopenex) 1 25 mg/3 mL nebulizer solution, Take 3 mL (1 25 mg total) by nebulization 3 (three) times a day (Patient taking differently: Take 1 25 mg by nebulization as needed), Disp: 270 mL, Rfl: 3    metoprolol succinate (TOPROL-XL) 50 mg 24 hr tablet, Take 1 tablet (50 mg total) by mouth daily, Disp: 90 tablet, Rfl: 1    montelukast (SINGULAIR) 10 mg tablet, Take 1 tablet (10 mg total) by mouth daily at bedtime, Disp: 90 tablet, Rfl: 1    potassium chloride (K-DUR,KLOR-CON) 20 mEq tablet, Take 2 tablets (40 mEq total) by mouth 2 (two) times a day, Disp: 120 tablet, Rfl: 5    sitaGLIPtin (Januvia) 100 mg tablet, Take 1/2 tab  daily, Disp: 30 tablet, Rfl: 5    Symbicort 160-4 5 MCG/ACT inhaler, 2 PUFFS BY MOUTH TWICE DAILY , RINSE MOUTH THEN SPIT AFTER USE, Disp: 30 6 g, Rfl: 2    tiotropium (Spiriva Respimat) 1 25 MCG/ACT AERS inhaler, Inhale 2 puffs daily, Disp: 4 g, Rfl: 3  Allergies   Allergen Reactions    Azithromycin Other (See Comments)     Shaky, uneasy feeling     Bactrim [Sulfamethoxazole-Trimethoprim] Other (See Comments)     shakiness       Vitals: Blood pressure 110/60, pulse 88, temperature (!) 96 6 °F (35 9 °C), temperature source Tympanic, height 6' 1" (1 854 m), weight (!) 145 kg (319 lb), SpO2 98 %  Body mass index is 42 09 kg/m²  Oxygen Therapy  SpO2: 98 %  Oxygen Therapy: None (Room air)      Physical Exam  Physical Exam  Vitals reviewed  Constitutional:       General: He is not in acute distress  Appearance: He is well-developed  He is obese  He is not ill-appearing, toxic-appearing or diaphoretic  HENT:      Head: Normocephalic and atraumatic  Right Ear: External ear normal       Left Ear: External ear normal       Nose: Nose normal  No congestion or rhinorrhea  Mouth/Throat:      Pharynx: No oropharyngeal exudate or posterior oropharyngeal erythema  Eyes:      General: No scleral icterus  Right eye: No discharge  Left eye: No discharge  Conjunctiva/sclera: Conjunctivae normal       Pupils: Pupils are equal, round, and reactive to light  Neck:      Trachea: No tracheal deviation  Cardiovascular:      Rate and Rhythm: Normal rate and regular rhythm  Heart sounds: Normal heart sounds  No murmur heard  No friction rub  No gallop  Pulmonary:      Effort: Pulmonary effort is normal       Breath sounds: Normal breath sounds  No stridor  No wheezing or rales  Musculoskeletal:      Cervical back: Normal range of motion  Right lower leg: No edema  Left lower leg: No edema  Lymphadenopathy:      Head:      Right side of head: No submental, submandibular, preauricular or posterior auricular adenopathy  Left side of head: No submental, submandibular, preauricular or posterior auricular adenopathy  Cervical: No cervical adenopathy  Skin:     General: Skin is warm and dry  Findings: No lesion or rash  Neurological:      Mental Status: He is alert and oriented to person, place, and time  Labs: I have personally reviewed pertinent lab results    Lab Results   Component Value Date    WBC 9 43 09/07/2022    HGB 11 3 (L) 09/07/2022    HCT 35 0 (L) 09/07/2022    MCV 91 09/07/2022     09/07/2022     Lab Results   Component Value Date    CALCIUM 8 9 09/07/2022    K 4 4 09/07/2022    CO2 27 09/07/2022     09/07/2022    BUN 40 (H) 09/07/2022    CREATININE 1 57 (H) 09/07/2022     Lab Results   Component Value Date     (H) 04/07/2022     Lab Results   Component Value Date    ALT 33 09/01/2022    AST 44 09/01/2022    ALKPHOS 147 (H) 09/01/2022       Imaging and other studies: I have personally reviewed pertinent reports  and I have personally reviewed pertinent films in PACS     11/9/2021   FINDINGS:     Cardiomediastinal silhouette appears unremarkable      The lungs are clear  No pneumothorax or pleural effusion      Osseous structures appear within normal limits for patient age      IMPRESSION:     No acute cardiopulmonary disease       Chest x-ray September 22, 2020  IMPRESSION:     No acute cardiopulmonary disease        Pulmonary function testing:  October 22, 2021    Results:  FVC: 3 31 L       61 % predicted  FEV1: 1 63 L     39 % predicted  FEV1/FVC Ratio: 49 %     After administration of bronchodilator   FVC: 3 89 L, 72 % predicted, 17 % change  FEV1: 2 45 L, 58 % predicted, 50 % change     Lung volumes by body plethysmography:   Total Lung Capacity 98 % predicted   Residual volume 183 % predicted     DLCO corrected for patients hemoglobin level: 65 %     Interpretation:     · Severe obstructive airflow defect on spirometry     · Significant improvement in airflow or forced vital capacity in response to the administration of bronchodilator per ATS standards      · Air trapping as indicated by the lung volumes     · Mild decrease in diffusion capacity     · Flow-volume loop consistent with obstruction    EKG, Pathology, and Other Studies: I have personally reviewed pertinent reports  and I have personally reviewed pertinent films in PACS    Echocardiogram July 2021   Left ventricular ejection fraction 13%, grade 1 diastolic dysfunction, mild left atrial dilation, normal RV size and function, no valvular stenosis  MARLIN Murphy    [facility]  Answers for HPI/ROS submitted by the patient on 10/4/2021  Do you experience frequent throat clearing?: Yes  Chronicity: recurrent  When did you first notice your symptoms?: more than 1 month ago  How often do your symptoms occur?: daily  Since you first noticed this problem, how has it changed?: gradually improving  Do you have shortness of breath that occurs with effort or exertion?: Yes  Do you have ear congestion?: No  Do you have heartburn?: No  Do you have fatigue?: Yes  Do you have nasal congestion?: No  Do you have shortness of breath when lying flat?: Yes  Do you have shortness of breath when you wake up?: Yes  Do you have sweats?: No  Have you experienced weight loss?: No  Which of the following makes your symptoms worse?: any activity, exercise, lying down, minimal activity  Which of the following makes your symptoms better?: change in position, diuretics, rest, steroid inhaler      Answers for HPI/ROS submitted by the patient on 9/6/2022  Do you have difficulty breathing?: Yes  Chronicity: recurrent  When did you first notice your symptoms?: 1 to 4 weeks ago  How often do your symptoms occur?: daily  Since you first noticed this problem, how has it changed?: unchanged  Do you have shortness of breath that occurs with effort or exertion?: Yes  Do you have ear congestion?: No  Do you have heartburn?: No  Do you have fatigue?: No  Do you have nasal congestion?: No  Do you have shortness of breath when lying flat?: Yes  Do you have shortness of breath when you wake up?: Yes  Which of the following makes your symptoms worse?: any activity, climbing stairs, lying down, minimal activity  Which of the following makes your symptoms better?: diuretics, oral steroids, steroid inhaler      Answers for HPI/ROS submitted by the patient on 9/16/2022  Do you have difficulty breathing?: Yes  Chronicity: chronic  When did you first notice your symptoms?: 1 to 4 weeks ago  How often do your symptoms occur?: daily  Since you first noticed this problem, how has it changed?: gradually improving  Do you have shortness of breath that occurs with effort or exertion?: No  Do you have ear congestion?: No  Do you have heartburn?: No  Do you have fatigue?: No  Do you have nasal congestion?: No  Do you have shortness of breath when lying flat?: No  Do you have shortness of breath when you wake up?: No  Do you have sweats?: No  Have you experienced weight loss?: No  Which of the following makes your symptoms worse?: any activity, climbing stairs, lying down  Which of the following makes your symptoms better?: rest

## 2022-09-16 NOTE — ASSESSMENT & PLAN NOTE
Recent admission for chest pain, shortness of breath complicated by acute exacerbation of asthma  He is doing very well today  Lungs are clear on examination  He completed steroid taper yesterday    Plan  Continue Symbicort, Spiriva, Singulair, albuterol p r n  Previously had peripheral eosinophilia though not seen on most recent labs which may have been due to steroid use  Will repeat CBC, total IgE in 6 weeks  May be candidate for biological treatment   He has received 2 COVID-19 vaccinations  He is reluctant to receive a 3rd because of getting sick afterwards   He typically does not receive influenza vaccination    I did recommend that he receive a   Are also recommended pneumonia vaccination which he is considering   He will follow-up in approximately 3 months

## 2022-09-16 NOTE — PROGRESS NOTES
OFFICE FOLLOW UP - Nephrology   Vasiliy Joiner 56 y o  male MRN: [id number]       ASSESSMENT and PLAN:  John Carr was seen today for follow-up and chronic kidney disease  Diagnoses and all orders for this visit:    Stage 3a chronic kidney disease ([facility] [address] )  -     CBC; Future  -     PTH, intact; Future  -     Renal function panel; Future  -     Protein / creatinine ratio, urine; Future    Chronic diastolic heart failure ([facility] [address] )    Essential hypertension    Morbid obesity due to excess calories ([facility] )    Mixed hyperlipidemia    Type 2 diabetes mellitus with stage 3b chronic kidney disease, without long-term current use of insulin ([facility])    Severe persistent asthma with acute exacerbation        This is a 82-year-old gentleman with history of diabetes, hypertension for over 3-4 years, morbid obesity status post bariatric surgery, chronic diastolic congestive heart failure, hyperlipidemia, who returns to the office for hospital follow-up  1  Chronic kidney disease with previous creatinine around 1 5-1 7 back since 2021  Suspected secondary to obesity, cardiorenal syndrome, diabetes and hypertension  Discussed with patient about importance of losing weight, continue having good blood pressure as well as good diabetes control  Avoid NSAIDs  I would like to see him back in the office in 6 months with repeat labs  Recent hospitalization with chest pain and shortness of breath complicated with acute kidney injury on top of CKD with creatinine up 2 1 7 that improved back to baseline on discharge day    2  Chronic diastolic congestive heart failure, history of AFib on anticoagulation with Eliquis, chest pain, status post cardiac catheter in not show any significant coronary artery disease  Continue close follow-up with Cardiology  Continue low-salt diet and diuretics  Patient currently going to cardiac rehab  Follow daily weight    3  Hypertension, blood pressure currently well controlled, no changes in medication      4  Diabetes, well controlled, continue with current regimen per primary care doctor  5  Morbid obesity status post bariatric surgery  Discussed with patient about importance of losing weight  6  Hyperlipidemia, continue with statins    7  Mineral bone disease, will check phosphorus and PTH with the next labs on 6 months    8  Mild anemia, last hemoglobin 11 3, follow CBC in 6 months      Patient Instructions   As we discussed in the office visit, you were recently hospitalized with chest pain and shortness of breath, at that time your kidney function worsened but eventually improved and went back to near baseline  Continue close follow-up with cardiologist with repeat labs  I would like to see you back in the office in 6 months with repeat blood and urine test   As we discussed in the office visit, it is very important to keep having good diabetes control (goal to keep hemoglobin A1c less than 7%) as well as having good blood pressure control  Keep working on losing weight  Avoid NSAIDs (no ibuprofen, Motrin, Advil, Aleve, naproxen)  Okay to take Tylenol or paracetamol or acetaminophen as needed for pain  Continue close follow-up with primary care doctor        HPI: Ben Carnes is a 56 y o  male who is here for Follow-up and Chronic Kidney Disease    This is a patient with history of hypertension and diabetes for around 3-4 years , history of morbid obesity status post bariatric surgery in 5436, chronic diastolic congestive heart failure, hyperlipidemia, who was recently seen consultation while patient was hospitalized in [facility] after admitted with chest pain and shortness of breath  Patient underwent a cardiac catheterization that did not show significant coronary artery disease  During that admission patient have an episode of acute kidney injury with creatinine up to 2 17, kidney function improved and went back to baseline on discharge day      Patient today returns to the office for hospital follow-up  Patient currently has no complaints at this time and is feeling well  He started doing cardiac rehab  Patient denies any chest pain, shortness of breath or leg swelling  Denies any abdominal pain, no nausea, vomiting, no diarrhea or constipation  Denies any urinary problems, no burning sensation or gross hematuria  Denies tobacco abuse  He takes ibuprofen very seldom for headache once every few months  Denies family history of kidney disease    The last blood work was done on 09/07/2022, which we have reviewed together  Most recent creatinine 1 57 with an estimated GFR of 48    ROS:   All the systems were reviewed and were negative except as documented on the HPI      Allergies: Azithromycin and Bactrim [sulfamethoxazole-trimethoprim]    Medications:   Current Outpatient Medications:     Accu-Chek FastClix Lancets MISC, Test blood sugar twice daily, Disp: 200 each, Rfl: 3    albuterol (PROVENTIL HFA,VENTOLIN HFA) 90 mcg/act inhaler, INHALE 2 PUFFS EVERY 4 (FOUR) HOURS AS NEEDED FOR WHEEZING OR SHORTNESS OF BREATH, Disp: 18 g, Rfl: 3    amLODIPine (NORVASC) 5 mg tablet, Take 1 tablet (5 mg total) by mouth daily, Disp: 30 tablet, Rfl: 0    apixaban (Eliquis) 5 mg, Take 1 tablet (5 mg total) by mouth 2 (two) times a day, Disp: 60 tablet, Rfl: 5    atorvastatin (LIPITOR) 10 mg tablet, Take 1 tablet (10 mg total) by mouth in the morning , Disp: 30 tablet, Rfl: 5    benzonatate (TESSALON PERLES) 100 mg capsule, Take 1 capsule (100 mg total) by mouth 3 (three) times a day as needed (for cough), Disp: 30 capsule, Rfl: 0    Blood Glucose Monitoring Suppl (Accu-Chek Guide) w/Device KIT, Use 2 (two) times a day Test blood sugar twice daily, Disp: 1 kit, Rfl: 0    bumetanide (BUMEX) 1 mg tablet, Take 3 tablets (3 mg total) by mouth 2 (two) times a day, Disp: 180 tablet, Rfl: 0    levalbuterol (Xopenex) 1 25 mg/3 mL nebulizer solution, Take 3 mL (1 25 mg total) by nebulization 3 (three) times a day (Patient taking differently: Take 1 25 mg by nebulization as needed), Disp: 270 mL, Rfl: 3    metoprolol succinate (TOPROL-XL) 50 mg 24 hr tablet, Take 1 tablet (50 mg total) by mouth daily, Disp: 90 tablet, Rfl: 1    montelukast (SINGULAIR) 10 mg tablet, Take 1 tablet (10 mg total) by mouth daily at bedtime, Disp: 90 tablet, Rfl: 1    potassium chloride (K-DUR,KLOR-CON) 20 mEq tablet, Take 2 tablets (40 mEq total) by mouth 2 (two) times a day, Disp: 120 tablet, Rfl: 5    sitaGLIPtin (Januvia) 100 mg tablet, Take 1/2 tab  daily, Disp: 30 tablet, Rfl: 5    Symbicort 160-4 5 MCG/ACT inhaler, 2 PUFFS BY MOUTH TWICE DAILY , RINSE MOUTH THEN SPIT AFTER USE, Disp: 30 6 g, Rfl: 2    tiotropium (Spiriva Respimat) 1 25 MCG/ACT AERS inhaler, Inhale 2 puffs daily, Disp: 4 g, Rfl: 3    Past Medical History:   Diagnosis Date    Acute gastritis without hemorrhage     last assessed 3/24/17    Asthma     Bilateral leg edema 2/19/2020    CHF (congestive heart failure) (HCC)     Daytime sleepiness 7/17/2019    Diabetes mellitus (HCC)     Dyspnea on exertion 10/11/2021    Edema of both feet 1/23/2020    Gastric bypass status for obesity     Hyperlipidemia     Hypertension     Hypokalemia 11/14/2021    Impaired fasting glucose     last assessed 5/10/17    Knee sprain, bilateral 6/14/2018    Leg cramps 6/16/2022    Obesity     Viral gastroenteritis     last assessed 11/4/16     Past Surgical History:   Procedure Laterality Date    CARDIAC CATHETERIZATION N/A 3/9/2022    Procedure: CARDIAC RHC W/ PRESSURE SENSOR;  Surgeon: Michelle Neal MD;  Location: [facility]; Service: Cardiology    CARDIAC CATHETERIZATION N/A 9/6/2022    Procedure: Cardiac catheterization;  Surgeon: Meghna Norman DO;  [facility]: [facility];   Service: Cardiology    CARDIAC CATHETERIZATION N/A 9/6/2022    Procedure: Cardiac Coronary Angiogram;  Surgeon: Meghna Norman DO;  [facility]: [facility]; Service: Cardiology    CARPAL TUNNEL RELEASE      bilateral    GASTRIC BYPASS  2004    with han en y    HERNIA REPAIR      ventral inscisional    TONSILLECTOMY       Family History   Problem Relation Age of Onset    Stroke Mother     Hypertension Father     Cancer Father     COPD Father       reports that he quit smoking about 2 years ago  His smoking use included pipe and cigars  He started smoking about 13 years ago  He has a 0 11 pack-year smoking history  He has never used smokeless tobacco  He reports previous alcohol use of about 2 0 standard drinks of alcohol per week  He reports that he does not use drugs  Physical Exam:   Vitals:    09/16/22 0857   BP: 114/62   BP Location: Left arm   Patient Position: Sitting   Cuff Size: Large   Weight: (!) 144 kg (318 lb)   Height: 6' (1 829 m)     Body mass index is 43 13 kg/m²  General: cooperative, in not acute distress  Eyes: conjunctivae pink, anicteric sclerae  ENT: lips and mucous membranes moist  Neck: supple, no JVD  Chest: clear breath sounds bilateral, no crackles, ronchus or wheezings  CVS: distinct S1 & S2, normal rate, regular rhythm  Abdomen: obese, non-tender, non-distended, normoactive bowel sounds  Back: no CVA tenderness  Extremities: minimal trace edema of both legs  Skin: no rash  Neuro: awake, alert, oriented      Laboratory Results:  Lab Results   Component Value Date    WBC 9 43 09/07/2022    HGB 11 3 (L) 09/07/2022    HCT 35 0 (L) 09/07/2022    MCV 91 09/07/2022     09/07/2022     Lab Results   Component Value Date    SODIUM 138 09/07/2022    K 4 4 09/07/2022     09/07/2022    CO2 27 09/07/2022    BUN 40 (H) 09/07/2022    CREATININE 1 57 (H) 09/07/2022    GLUC 158 (H) 09/07/2022    CALCIUM 8 9 09/07/2022     Lab Results   Component Value Date    CALCIUM 8 9 09/07/2022    PHOS 2 4 (L) 11/10/2021             Portions of the record may have been created with voice recognition software   Occasional wrong word or "sound a like" substitutions may have occurred due to the inherent limitations of voice recognition software  Read the chart carefully and recognize, using context, where substitutions have occurred  If you have any questions, please contact the dictating provider

## 2022-09-16 NOTE — ASSESSMENT & PLAN NOTE
Wt Readings from Last 3 Encounters:   09/16/22 (!) 145 kg (319 lb)   09/16/22 (!) 144 kg (318 lb)   09/15/22 (!) 146 kg (322 lb)     Recent admission  Cardiac catheterization performed without obstructive component  He is currently on diuretic therapy checks his weights daily  Continue diuresis  Continue follow-up with Cardiology and also Nephrology

## 2022-09-16 NOTE — ASSESSMENT & PLAN NOTE
BP remains stable  Continue Amlodipine 5 mg daily, Metoprolol ER 50 mg daily  Continue to monitor blood pressure at home

## 2022-09-16 NOTE — PATIENT INSTRUCTIONS
As we discussed in the office visit, you were recently hospitalized with chest pain and shortness of breath, at that time your kidney function worsened but eventually improved and went back to near baseline  Continue close follow-up with cardiologist with repeat labs  I would like to see you back in the office in 6 months with repeat blood and urine test   As we discussed in the office visit, it is very important to keep having good diabetes control (goal to keep hemoglobin A1c less than 7%) as well as having good blood pressure control  Keep working on losing weight  Avoid NSAIDs (no ibuprofen, Motrin, Advil, Aleve, naproxen)  Okay to take Tylenol or paracetamol or acetaminophen as needed for pain      Continue close follow-up with primary care doctor

## 2022-09-19 ENCOUNTER — LAB (OUTPATIENT)
Dept: LAB | Facility: CLINIC | Age: 57
End: 2022-09-19
Payer: COMMERCIAL

## 2022-09-19 DIAGNOSIS — E11.22 TYPE 2 DIABETES MELLITUS WITH STAGE 3B CHRONIC KIDNEY DISEASE, WITHOUT LONG-TERM CURRENT USE OF INSULIN (HCC): Chronic | ICD-10-CM

## 2022-09-19 DIAGNOSIS — N18.32 TYPE 2 DIABETES MELLITUS WITH STAGE 3B CHRONIC KIDNEY DISEASE, WITHOUT LONG-TERM CURRENT USE OF INSULIN (HCC): Chronic | ICD-10-CM

## 2022-09-19 DIAGNOSIS — I50.33 ACUTE ON CHRONIC HEART FAILURE WITH PRESERVED EJECTION FRACTION (HCC): ICD-10-CM

## 2022-09-19 DIAGNOSIS — E78.2 MIXED HYPERLIPIDEMIA: ICD-10-CM

## 2022-09-19 DIAGNOSIS — Z12.5 SCREENING FOR PROSTATE CANCER: ICD-10-CM

## 2022-09-19 DIAGNOSIS — I10 ESSENTIAL HYPERTENSION: ICD-10-CM

## 2022-09-19 DIAGNOSIS — R25.2 LEG CRAMPS: ICD-10-CM

## 2022-09-19 DIAGNOSIS — I50.32 CHRONIC DIASTOLIC CONGESTIVE HEART FAILURE (HCC): ICD-10-CM

## 2022-09-19 DIAGNOSIS — I50.32 CHRONIC HEART FAILURE WITH PRESERVED EJECTION FRACTION (HCC): ICD-10-CM

## 2022-09-19 DIAGNOSIS — N18.31 STAGE 3A CHRONIC KIDNEY DISEASE (HCC): ICD-10-CM

## 2022-09-19 DIAGNOSIS — E11.65 TYPE 2 DIABETES MELLITUS WITH HYPERGLYCEMIA, WITHOUT LONG-TERM CURRENT USE OF INSULIN (HCC): ICD-10-CM

## 2022-09-19 LAB
ALBUMIN SERPL BCP-MCNC: 3.2 G/DL (ref 3.5–5)
ALP SERPL-CCNC: 204 U/L (ref 46–116)
ALT SERPL W P-5'-P-CCNC: 41 U/L (ref 12–78)
ANION GAP SERPL CALCULATED.3IONS-SCNC: 6 MMOL/L (ref 4–13)
AST SERPL W P-5'-P-CCNC: 25 U/L (ref 5–45)
BILIRUB SERPL-MCNC: 0.67 MG/DL (ref 0.2–1)
BUN SERPL-MCNC: 22 MG/DL (ref 5–25)
CALCIUM ALBUM COR SERPL-MCNC: 9.3 MG/DL (ref 8.3–10.1)
CALCIUM SERPL-MCNC: 8.7 MG/DL (ref 8.3–10.1)
CHLORIDE SERPL-SCNC: 102 MMOL/L (ref 96–108)
CHOLEST SERPL-MCNC: 235 MG/DL
CO2 SERPL-SCNC: 28 MMOL/L (ref 21–32)
CREAT SERPL-MCNC: 1.53 MG/DL (ref 0.6–1.3)
CREAT UR-MCNC: 154 MG/DL
GFR SERPL CREATININE-BSD FRML MDRD: 50 ML/MIN/1.73SQ M
GLUCOSE P FAST SERPL-MCNC: 158 MG/DL (ref 65–99)
HDLC SERPL-MCNC: 64 MG/DL
LDLC SERPL CALC-MCNC: 131 MG/DL (ref 0–100)
MAGNESIUM SERPL-MCNC: 2.3 MG/DL (ref 1.6–2.6)
MICROALBUMIN UR-MCNC: 50.2 MG/L (ref 0–20)
MICROALBUMIN/CREAT 24H UR: 33 MG/G CREATININE (ref 0–30)
NONHDLC SERPL-MCNC: 171 MG/DL
POTASSIUM SERPL-SCNC: 4.2 MMOL/L (ref 3.5–5.3)
PROT SERPL-MCNC: 7.2 G/DL (ref 6.4–8.4)
PSA SERPL-MCNC: 0.8 NG/ML (ref 0–4)
SODIUM SERPL-SCNC: 136 MMOL/L (ref 135–147)
TRIGL SERPL-MCNC: 198 MG/DL

## 2022-09-19 PROCEDURE — G0103 PSA SCREENING: HCPCS

## 2022-09-19 PROCEDURE — 80061 LIPID PANEL: CPT

## 2022-09-19 PROCEDURE — 80053 COMPREHEN METABOLIC PANEL: CPT

## 2022-09-19 PROCEDURE — 83735 ASSAY OF MAGNESIUM: CPT

## 2022-09-19 PROCEDURE — 36415 COLL VENOUS BLD VENIPUNCTURE: CPT

## 2022-09-19 RX ORDER — BUMETANIDE 1 MG/1
3 TABLET ORAL 2 TIMES DAILY
Qty: 180 TABLET | Refills: 3 | Status: SHIPPED | OUTPATIENT
Start: 2022-09-19 | End: 2022-10-19

## 2022-09-20 DIAGNOSIS — E78.2 MIXED HYPERLIPIDEMIA: Primary | Chronic | ICD-10-CM

## 2022-09-21 ENCOUNTER — APPOINTMENT (OUTPATIENT)
Dept: CARDIAC REHAB | Age: 57
End: 2022-09-21
Payer: COMMERCIAL

## 2022-09-21 ENCOUNTER — OFFICE VISIT (OUTPATIENT)
Dept: CARDIOLOGY CLINIC | Facility: CLINIC | Age: 57
End: 2022-09-21
Payer: COMMERCIAL

## 2022-09-21 VITALS
HEIGHT: 73 IN | WEIGHT: 315 LBS | SYSTOLIC BLOOD PRESSURE: 104 MMHG | BODY MASS INDEX: 41.75 KG/M2 | HEART RATE: 82 BPM | DIASTOLIC BLOOD PRESSURE: 62 MMHG | OXYGEN SATURATION: 98 %

## 2022-09-21 DIAGNOSIS — I48.0 PAROXYSMAL ATRIAL FIBRILLATION (HCC): ICD-10-CM

## 2022-09-21 DIAGNOSIS — G47.33 OSA (OBSTRUCTIVE SLEEP APNEA): ICD-10-CM

## 2022-09-21 DIAGNOSIS — I10 ESSENTIAL HYPERTENSION: ICD-10-CM

## 2022-09-21 DIAGNOSIS — I50.32 CHRONIC HEART FAILURE WITH PRESERVED EJECTION FRACTION (HCC): Primary | ICD-10-CM

## 2022-09-21 PROCEDURE — 99214 OFFICE O/P EST MOD 30 MIN: CPT | Performed by: INTERNAL MEDICINE

## 2022-09-21 NOTE — PATIENT INSTRUCTIONS
Continue current medications  Continue rehab  2g sodium diet  2L fluid restriction  Daily weights  Follow up on CPAP

## 2022-09-23 ENCOUNTER — TELEPHONE (OUTPATIENT)
Dept: CARDIOLOGY CLINIC | Facility: CLINIC | Age: 57
End: 2022-09-23

## 2022-09-23 NOTE — TELEPHONE ENCOUNTER
A call from EXT [id number]-Patient asking for refill -pt did NOT leave the medication he needed to be fill- There is no v-mail when calling back

## 2022-09-26 ENCOUNTER — TELEPHONE (OUTPATIENT)
Dept: CARDIAC REHAB | Age: 57
End: 2022-09-26

## 2022-09-27 DIAGNOSIS — I50.32 CHRONIC HEART FAILURE WITH PRESERVED EJECTION FRACTION (HCC): ICD-10-CM

## 2022-09-27 RX ORDER — METOPROLOL SUCCINATE 50 MG/1
50 TABLET, EXTENDED RELEASE ORAL DAILY
Qty: 90 TABLET | Refills: 3 | Status: SHIPPED | OUTPATIENT
Start: 2022-09-27

## 2022-09-30 ENCOUNTER — APPOINTMENT (OUTPATIENT)
Dept: CARDIAC REHAB | Age: 57
End: 2022-09-30
Payer: COMMERCIAL

## 2022-10-10 ENCOUNTER — TELEPHONE (OUTPATIENT)
Dept: CARDIAC REHAB | Age: 57
End: 2022-10-10

## 2022-10-10 DIAGNOSIS — I10 ESSENTIAL HYPERTENSION: Primary | ICD-10-CM

## 2022-10-10 RX ORDER — AMLODIPINE BESYLATE 5 MG/1
5 TABLET ORAL DAILY
Qty: 90 TABLET | Refills: 2 | Status: SHIPPED | OUTPATIENT
Start: 2022-10-10

## 2022-10-17 ENCOUNTER — TELEPHONE (OUTPATIENT)
Dept: CARDIAC REHAB | Age: 57
End: 2022-10-17

## 2022-10-20 ENCOUNTER — TELEPHONE (OUTPATIENT)
Dept: CARDIOLOGY CLINIC | Facility: CLINIC | Age: 57
End: 2022-10-20

## 2022-10-20 NOTE — TELEPHONE ENCOUNTER
Clarisse Whitlock MD  You 1 hour ago (12:04 PM)       Can take extra 3mg of bumex and 40 meq of potassium today and see how he does  Thanks  Message text     S/w Keon advised-- verbally understood  Will monitor [facility] tomorrow

## 2022-10-20 NOTE — TELEPHONE ENCOUNTER
Goal 15  10/20 21  10/19 18  10/18 19  10/17 16  10/16 22  10/15 -  10/14 16    C/o SOB w/ exertion today  No LE edema or orthopnea   Also c/o nasal congestion and sneezing x1 week  States asthma at baseline     Weight today 309 lbs  Weights at home have been ranging between 306-309 lbs    Quit job-- currently doing [employer]     9/19 CMP creat-- 1 53, K 4 2     Currently taking Bumex 3 mg BID and Potassium 40 meq BID

## 2022-10-21 NOTE — TELEPHONE ENCOUNTER
PAD today 19, still above goal of 15  Call made to Keon to follow up to see how he is doing  SOB has improved    Weight today 308 lbs  Lost 1 lb overnight   Did void large amounts yesteday-- stated he "peed like a race horse"

## 2022-10-21 NOTE — PROGRESS NOTES
Benedicto Lauren      Dear Dr Jose Rafael Gibson,     Thank you for referring your patient to our cardiac rehabilitation program  The patient has completed 1 - initial evaluation visit  However, rehab is being discontinued at this time due to:    Noncompliance: The patient has not regularly attended his/her rehab sessions  Last session attended was on 9/14/22  He has been contacted via telephone multiple times  Please contact us at [phone] if you have any questions about this patent’s case  Thank you for your continued support of cardiac rehabilitation         Sincerely,    Luis A Waters MS, CEP

## 2022-10-21 NOTE — TELEPHONE ENCOUNTER
Clarisse Whitlock MD  You 3 minutes ago (11:33 AM)       Can take another extra 3mg of bumex and 40 meq of potassium today   Thanks,   [employer] [place] text   S/w Keon advised-- verbally understood  Agreeable w/ plan   Will f/u w/ [facility] on Monday

## 2022-11-04 DIAGNOSIS — I48.91 ATRIAL FIBRILLATION, UNSPECIFIED TYPE (HCC): ICD-10-CM

## 2022-11-04 DIAGNOSIS — I48.91 A-FIB (HCC): ICD-10-CM

## 2022-11-09 ENCOUNTER — TELEPHONE (OUTPATIENT)
Dept: CARDIOLOGY CLINIC | Facility: CLINIC | Age: 57
End: 2022-11-09

## 2022-11-09 NOTE — TELEPHONE ENCOUNTER
Keon called, [facility] unable to be turned on this morning  He s/w [employer] support, they were unable to troubleshoot  A new pillow will be sent out and will arrive in 1-2 days  I will keep an eye out for readings  Instructed Keon to call w/ HF symptoms-- verbally understood

## 2022-11-09 NOTE — ASSESSMENT & PLAN NOTE
Wt Readings from Last 3 Encounters:   09/01/22 (!) 139 kg (306 lb)   08/17/22 (!) 142 kg (312 lb 6 4 oz)   08/08/22 (!) 142 kg (312 lb)     · Clinically appears euvolemic  · Weight is lower than last cardiology appointment on 08/17/2022    · Hyponatremia noted  · No signs of overload  · Continue home dose Bumex 3 mg BID  · Check cardiac BMP  · I&O and daily weight  · Cardiology consult  · Salt and fluid-restricted diet Yes

## 2022-11-18 ENCOUNTER — TELEPHONE (OUTPATIENT)
Dept: CARDIOLOGY CLINIC | Facility: CLINIC | Age: 57
End: 2022-11-18

## 2022-11-18 NOTE — TELEPHONE ENCOUNTER
Ashleigh Dahl left message on HF line c/o SOB w/ exertion last couple of days  PAD's WNL-- 11/18 15, 11/17 16, 11/16 17, 11/15 15, 11/14 17, 11/13 15  Goal 15    Please call patient and triage    Thank you

## 2022-11-18 NOTE — TELEPHONE ENCOUNTER
Called and spoke to pt, SOB started on Wednesday night 11/16/2022 definitely with exertion, at rest no sob  Denies chest pain,palipations,abdomin bloating  Pt states very congested/coughing up phlegm started Wednesday, not sure if that is his asthma flare up  LL/feet edema slightly swollen, can still get shoes on  Pt states is follow fluid and food intake restrictions  /70 HR 80-86     Friday 11/18/2022 Wt 320 LBS (pt is up 5 lbs)   thurs 11/17/22 Wt 315 00 LBS   11/16/22  Wt 315 00 LBS  11/15/22 Wt 314  oo LBS    Metoprolol succinate 50 mg qd   Amlodipine 5 mg qd  Bumex 3 mg bid  Potassium 40 meq bid   Januvia  50 mg qd   Eliquis 5 mg bid    Also TT Dr Noel Ozuna    Please advise

## 2022-11-18 NOTE — TELEPHONE ENCOUNTER
PADs at goal but with significant weight gain and symptoms  Can take extra 3mg of bumex today with potassium 40 meq and see how he does  Can redose on the weekend if weight still up  If weight down and still symptomatic, would contact pulmonary

## 2022-11-18 NOTE — TELEPHONE ENCOUNTER
Called and spoke to pt and advised Dr Cole Else, advised pt will follow up next week on how he is feeling  Pt read back instructions of medication     Pt verbally undertstood

## 2022-11-21 ENCOUNTER — TELEPHONE (OUTPATIENT)
Dept: CARDIOLOGY CLINIC | Facility: CLINIC | Age: 57
End: 2022-11-21

## 2022-11-21 DIAGNOSIS — I48.91 A-FIB (HCC): ICD-10-CM

## 2022-11-21 DIAGNOSIS — I48.91 ATRIAL FIBRILLATION, UNSPECIFIED TYPE (HCC): ICD-10-CM

## 2022-11-21 DIAGNOSIS — I50.32 CHRONIC DIASTOLIC HEART FAILURE (HCC): Primary | ICD-10-CM

## 2022-11-21 NOTE — TELEPHONE ENCOUNTER
Agree with contacting pulmonary given constellation of symptoms  Obtain BMP  Hold off on further extra doses of bumex  Daily weights, salt restriction

## 2022-11-21 NOTE — TELEPHONE ENCOUNTER
Call made to Keon to follow up to see how he is doing  S/p increase Bumex 3 mg on Friday  Keon took extra 3 mg Bumex on Friday, Saturday and Sunday  He states he was unaware he needed to take extra Potassium so he did not increase  I instructed him to take Potassium 40 meq STAT  Already took 40 meq this morning  C/o LE cramping-- admits to drinking pickle juice this morning w/ relief to cramping-- discussed pickle juice loaded w/ sodium-- aware  C/o SOB w/ exertion and w/ eating, increase in phlegm, runny nose and nasal congestion, LE edema  Denies fever or abd bloating  States he did have an increase in urine output w/ increase in Bumex but not much    Scale broke on Friday, unable to weigh himself since then  Instructed him to purchase new scale  PAD today 14  11/20 12  11/19 13  11/18 15  Goal 15    Likely asthma flare- instructed to contact Pulmonary-- verbally understood  I also instructed him he will need labs to evaluate electrolytes-- verbally understood  Advised him I would discuss w/ you and call him back-- verbally understood

## 2022-11-22 ENCOUNTER — LAB (OUTPATIENT)
Dept: LAB | Facility: CLINIC | Age: 57
End: 2022-11-22

## 2022-11-22 DIAGNOSIS — E78.2 MIXED HYPERLIPIDEMIA: Chronic | ICD-10-CM

## 2022-11-22 DIAGNOSIS — I50.32 CHRONIC DIASTOLIC HEART FAILURE (HCC): ICD-10-CM

## 2022-11-22 LAB
ANION GAP SERPL CALCULATED.3IONS-SCNC: 7 MMOL/L (ref 4–13)
BUN SERPL-MCNC: 25 MG/DL (ref 5–25)
CALCIUM SERPL-MCNC: 9.3 MG/DL (ref 8.3–10.1)
CHLORIDE SERPL-SCNC: 100 MMOL/L (ref 96–108)
CHOLEST SERPL-MCNC: 165 MG/DL
CO2 SERPL-SCNC: 30 MMOL/L (ref 21–32)
CREAT SERPL-MCNC: 1.59 MG/DL (ref 0.6–1.3)
GFR SERPL CREATININE-BSD FRML MDRD: 48 ML/MIN/1.73SQ M
GLUCOSE P FAST SERPL-MCNC: 180 MG/DL (ref 65–99)
HDLC SERPL-MCNC: 55 MG/DL
LDLC SERPL CALC-MCNC: 85 MG/DL (ref 0–100)
NONHDLC SERPL-MCNC: 110 MG/DL
POTASSIUM SERPL-SCNC: 4.1 MMOL/L (ref 3.5–5.3)
SODIUM SERPL-SCNC: 137 MMOL/L (ref 135–147)
TRIGL SERPL-MCNC: 127 MG/DL

## 2022-11-29 DIAGNOSIS — J45.41 MODERATE PERSISTENT ASTHMA WITH ACUTE EXACERBATION: ICD-10-CM

## 2022-11-29 RX ORDER — BUDESONIDE AND FORMOTEROL FUMARATE DIHYDRATE 160; 4.5 UG/1; UG/1
2 AEROSOL RESPIRATORY (INHALATION) 2 TIMES DAILY
Qty: 30.6 G | Refills: 2 | Status: SHIPPED | OUTPATIENT
Start: 2022-11-29

## 2022-12-02 ENCOUNTER — OFFICE VISIT (OUTPATIENT)
Dept: CARDIOLOGY CLINIC | Facility: CLINIC | Age: 57
End: 2022-12-02

## 2022-12-02 DIAGNOSIS — I50.32 CHRONIC DIASTOLIC HEART FAILURE (HCC): Primary | ICD-10-CM

## 2022-12-12 ENCOUNTER — APPOINTMENT (OUTPATIENT)
Dept: LAB | Facility: CLINIC | Age: 57
End: 2022-12-12

## 2022-12-12 DIAGNOSIS — J45.51 SEVERE PERSISTENT ASTHMA WITH ACUTE EXACERBATION: ICD-10-CM

## 2022-12-12 LAB
BASOPHILS # BLD AUTO: 0.07 THOUSANDS/ÂΜL (ref 0–0.1)
BASOPHILS NFR BLD AUTO: 1 % (ref 0–1)
EOSINOPHIL # BLD AUTO: 0.59 THOUSAND/ÂΜL (ref 0–0.61)
EOSINOPHIL NFR BLD AUTO: 7 % (ref 0–6)
ERYTHROCYTE [DISTWIDTH] IN BLOOD BY AUTOMATED COUNT: 12.3 % (ref 11.6–15.1)
HCT VFR BLD AUTO: 38.1 % (ref 36.5–49.3)
HGB BLD-MCNC: 12 G/DL (ref 12–17)
IMM GRANULOCYTES # BLD AUTO: 0.06 THOUSAND/UL (ref 0–0.2)
IMM GRANULOCYTES NFR BLD AUTO: 1 % (ref 0–2)
LYMPHOCYTES # BLD AUTO: 0.86 THOUSANDS/ÂΜL (ref 0.6–4.47)
LYMPHOCYTES NFR BLD AUTO: 10 % (ref 14–44)
MCH RBC QN AUTO: 28.6 PG (ref 26.8–34.3)
MCHC RBC AUTO-ENTMCNC: 31.5 G/DL (ref 31.4–37.4)
MCV RBC AUTO: 91 FL (ref 82–98)
MONOCYTES # BLD AUTO: 0.64 THOUSAND/ÂΜL (ref 0.17–1.22)
MONOCYTES NFR BLD AUTO: 7 % (ref 4–12)
NEUTROPHILS # BLD AUTO: 6.87 THOUSANDS/ÂΜL (ref 1.85–7.62)
NEUTS SEG NFR BLD AUTO: 74 % (ref 43–75)
NRBC BLD AUTO-RTO: 0 /100 WBCS
PLATELET # BLD AUTO: 319 THOUSANDS/UL (ref 149–390)
PMV BLD AUTO: 10.2 FL (ref 8.9–12.7)
RBC # BLD AUTO: 4.2 MILLION/UL (ref 3.88–5.62)
WBC # BLD AUTO: 9.09 THOUSAND/UL (ref 4.31–10.16)

## 2022-12-13 LAB — TOTAL IGE SMQN RAST: 435 KU/L (ref 0–113)

## 2022-12-17 PROBLEM — J45.50 SEVERE PERSISTENT ASTHMA: Status: ACTIVE | Noted: 2021-10-11

## 2022-12-17 PROBLEM — R07.9 CHEST PAIN: Status: RESOLVED | Noted: 2022-09-01 | Resolved: 2022-12-17

## 2022-12-20 ENCOUNTER — OFFICE VISIT (OUTPATIENT)
Dept: FAMILY MEDICINE CLINIC | Facility: CLINIC | Age: 57
End: 2022-12-20

## 2022-12-20 VITALS
DIASTOLIC BLOOD PRESSURE: 70 MMHG | WEIGHT: 315 LBS | HEART RATE: 77 BPM | HEIGHT: 73 IN | OXYGEN SATURATION: 98 % | RESPIRATION RATE: 16 BRPM | SYSTOLIC BLOOD PRESSURE: 122 MMHG | TEMPERATURE: 97.7 F | BODY MASS INDEX: 41.75 KG/M2

## 2022-12-20 DIAGNOSIS — E11.65 TYPE 2 DIABETES MELLITUS WITH HYPERGLYCEMIA, WITHOUT LONG-TERM CURRENT USE OF INSULIN (HCC): ICD-10-CM

## 2022-12-20 DIAGNOSIS — E66.01 MORBID OBESITY DUE TO EXCESS CALORIES (HCC): Chronic | ICD-10-CM

## 2022-12-20 DIAGNOSIS — J45.50 SEVERE PERSISTENT ASTHMA WITHOUT COMPLICATION: ICD-10-CM

## 2022-12-20 DIAGNOSIS — I48.0 PAROXYSMAL ATRIAL FIBRILLATION (HCC): Chronic | ICD-10-CM

## 2022-12-20 DIAGNOSIS — N18.31 STAGE 3A CHRONIC KIDNEY DISEASE (HCC): ICD-10-CM

## 2022-12-20 DIAGNOSIS — E78.2 MIXED HYPERLIPIDEMIA: Chronic | ICD-10-CM

## 2022-12-20 DIAGNOSIS — I10 ESSENTIAL HYPERTENSION: Primary | Chronic | ICD-10-CM

## 2022-12-20 DIAGNOSIS — Z12.11 SCREENING FOR COLON CANCER: ICD-10-CM

## 2022-12-20 DIAGNOSIS — G47.33 OSA (OBSTRUCTIVE SLEEP APNEA): Chronic | ICD-10-CM

## 2022-12-20 DIAGNOSIS — E11.42 DIABETIC POLYNEUROPATHY ASSOCIATED WITH TYPE 2 DIABETES MELLITUS (HCC): ICD-10-CM

## 2022-12-20 DIAGNOSIS — I50.32 CHRONIC DIASTOLIC HEART FAILURE (HCC): Chronic | ICD-10-CM

## 2022-12-20 LAB — SL AMB POCT HEMOGLOBIN AIC: 6.2 (ref ?–6.5)

## 2022-12-20 RX ORDER — BENZONATATE 100 MG/1
100 CAPSULE ORAL 3 TIMES DAILY PRN
Qty: 30 CAPSULE | Refills: 0 | Status: SHIPPED | OUTPATIENT
Start: 2022-12-20

## 2022-12-20 RX ORDER — PREGABALIN 50 MG/1
CAPSULE ORAL
Qty: 30 CAPSULE | Refills: 5 | Status: SHIPPED | OUTPATIENT
Start: 2022-12-20

## 2022-12-20 NOTE — PROGRESS NOTES
Name: Jim Grove      : 1965      MRN: [id number]  Encounter Provider: Hieu Hobbs MD  Encounter Date: 2022   Encounter department: [address] [place] [address] Drive    Chief Complaint   Patient presents with   • Follow-up     6 month      Health Maintenance   Topic Date Due   • Hepatitis A Vaccine (1 of 2 - Risk 2-dose series) Never done   • Pneumococcal Vaccine: Pediatrics (0 to 5 Years) and At-Risk Patients (6 to 59 Years) (1 - PCV) Never done   • HIV Screening  Never done   • Colorectal Cancer Screening  Never done   • COVID-19 Vaccine (3 - Booster for Pfizer series) 2021   • Influenza Vaccine (1) Never done   • Diabetic Foot Exam  10/13/2022   • DM Eye Exam  2022   • BMI: Followup Plan  2023   • Depression Screening  2023   • Annual Physical  2023   • HEMOGLOBIN A1C  2023   • URINE MICROALBUMIN  2023   • BMI: Adult  2023   • Hepatitis B Vaccine (1 of 3 - Risk 3-dose series) 2027   • DTaP,Tdap,and Td Vaccines (2 - Td or Tdap) 2028   • Hepatitis C Screening  Completed   • HIB Vaccine  Aged Out   • IPV Vaccine  Aged Out   • Meningococcal ACWY Vaccine  Aged Out   • HPV Vaccine  Aged Out       Assessment & Plan     1  Essential hypertension  Assessment & Plan:  BP at goal   Continue Amlodipine 5 mg daily, Metoprolol ER 50 mg daily  Continue to monitor blood pressure at home  Follow-up with cardiology Dr Phoebe Kam  2  Paroxysmal atrial fibrillation [facility])  Assessment & Plan:  Patient reports no heart palpitations  Continue beta-blocker, anticoagulation with Eliquis 5 mg twice daily as per cardiology  3  Chronic diastolic heart failure [facility])  Assessment & Plan:  Wt Readings from Last 3 Encounters:   22 (!) 147 kg (324 lb 9 6 oz)   22 (!) 146 kg (321 lb 11 2 oz)   22 (!) 145 kg (319 lb)     Patient reports no worsening shortness of breath or leg edema  Weight has been stable      Continue Bumex 3 mg twice daily     Follow a low-sodium diet  Check daily weights  Follow-up with cardiology Dr Phoebe Kam on 12/28/22               4  Type 2 diabetes mellitus with hyperglycemia, without long-term current use of insulin ([facility])  Assessment & Plan:    Lab Results   Component Value Date    HGBA1C 6 2 12/20/2022     HB A1C at goal   Continue Januvia 100 mg - 1/2 tab  daily  Check eye exam by ophthalmology annually  Follow-up with podiatry Dr Mirta Neff  Orders:  -     POCT hemoglobin A1c    5  VICKY (obstructive sleep apnea)  Assessment & Plan:  Recommended to use CPAP  6  Stage 3a chronic kidney disease [facility])  Assessment & Plan:  Lab Results   Component Value Date    EGFR 48 11/22/2022    EGFR 50 09/19/2022    EGFR 48 09/07/2022    CREATININE 1 59 (H) 11/22/2022    CREATININE 1 53 (H) 09/19/2022    CREATININE 1 57 (H) 09/07/2022     Avoid NSAIDs, nephrotoxins  Follow-up with nephrology Dr Lizbet Pagan in 3/2023         7  Mixed hyperlipidemia  Assessment & Plan:  Lipid panel improved since September 2022  Continue atorvastatin 10 mg daily  Follow a low-cholesterol diet, regular exercise  8  Morbid obesity due to excess calories [facility])  Assessment & Plan:  Recommended to work on dietary and lifestyle modifications, losing weight  9  Diabetic polyneuropathy associated with type 2 diabetes mellitus [facility])  Assessment & Plan:    Lab Results   Component Value Date    HGBA1C 6 2 12/20/2022     Discussed treatment options with patient  Will start Lyrica 50 mg at bedtime  Reviewed side effects  Recommended to call with update on symptoms in 10 - 14 days  Orders:  -     pregabalin (LYRICA) 50 mg capsule; Take 1 caps  at bedtime    10  Severe persistent asthma without complication  Assessment & Plan:  Continue Spiriva Respimat inhaler, Symbicort inhaler daily  Use Ventolin HFA inhaler, Xopenex via nebulizer PRN  Continue Singulair 10 mg at bedtime      Follow-up with pulmonology Dr Mary Cabrera on 12/27/22  Orders:  -     benzonatate (TESSALON PERLES) 100 mg capsule; Take 1 capsule (100 mg total) by mouth 3 (three) times a day as needed (for cough)    11  Screening for colon cancer  -     Ambulatory referral for colonoscopy; Future      Schedule follow-up office visit in 6 months  Subjective      HPI     Patient presents for 6-month follow-up on chronic medical conditions  PMHx: HTN, chronic diastolic CHF, PAF,Type 2 DM, CKD stage 3, severe persistent asthma, VICKY, Morbid obesity, Hyperlipidemia  Reviewed current medications, specialists visit notes,  blood test results from  11/2022  Glucose 189, creatinine 1 59 - stable, GFR 48, potassium 4 1  Cholesterol 185, HDL 55, LDL 85 ( improved from 131 in 9/22)  HTN / Chronic diastolic CHF - blood pressure remained stable  Patient denies chest pain, dizziness  He has stable exertional shortness of breath  Followed by cardiology Dr Bessie Luu  Severe persistent asthma - c/o productive cough in the the morning  Patient has follow-up visit scheduled with pulmonology  Dr Bipin Ash on 12/27/22  Currently using Spiriva Respimat inhaler and Symbicort inhaler  He uses rescue inhaler and nebulizer treatments PRN  Hyperlipidemia - currently on Atorvastatin 10 mg daily  Type 2 DM - patient takes Januvia 100 mg -1/2 tablet daily  Reports no hypoglycemic episodes  Blood sugar ranges 126-134  C/o numbness, tingling in feet  Patient tried Vit B complex as recommend by podiatrist Dr French Menendez with no improvement in symptoms  CKD stage 3 - followed by nephrology Dr Tyrel Ly  Declined Flu shot and Prevnar 20 vaccination  Review of Systems   Constitutional: Positive for fatigue (mild)  Negative for activity change, appetite change and chills  HENT: Negative for congestion, ear pain, hearing loss, sore throat and trouble swallowing  Eyes: Negative  Respiratory: Positive for cough and shortness of breath   Negative for chest tightness and wheezing  Cardiovascular: Positive for leg swelling  Negative for chest pain and palpitations  Gastrointestinal: Negative for abdominal pain, blood in stool, constipation, diarrhea, nausea and vomiting  Genitourinary: Negative for difficulty urinating, dysuria and hematuria  Musculoskeletal: Negative for arthralgias, back pain, gait problem and joint swelling  Skin: Negative for wound  Neurological: Positive for numbness (in feet)  Negative for dizziness, syncope and headaches  Hematological: Negative for adenopathy  Bruises/bleeds easily  Psychiatric/Behavioral: Negative for dysphoric mood and sleep disturbance  The patient is not nervous/anxious  Current Outpatient Medications on File Prior to Visit   Medication Sig   • Accu-Chek FastClix Lancets MISC Test blood sugar twice daily   • albuterol (PROVENTIL HFA,VENTOLIN HFA) 90 mcg/act inhaler INHALE 2 PUFFS EVERY 4 (FOUR) HOURS AS NEEDED FOR WHEEZING OR SHORTNESS OF BREATH   • amLODIPine (NORVASC) 5 mg tablet Take 1 tablet (5 mg total) by mouth daily   • apixaban (Eliquis) 5 mg Take 1 tablet (5 mg total) by mouth 2 (two) times a day   • atorvastatin (LIPITOR) 10 mg tablet Take 1 tablet (10 mg total) by mouth in the morning  • Blood Glucose Monitoring Suppl (Accu-Chek Guide) w/Device KIT Use 2 (two) times a day Test blood sugar twice daily   • budesonide-formoterol (Symbicort) 160-4 5 mcg/act inhaler Inhale 2 puffs 2 (two) times a day Rinse mouth after use     • bumetanide (BUMEX) 1 mg tablet Take 3 tablets (3 mg total) by mouth 2 (two) times a day   • levalbuterol (Xopenex) 1 25 mg/3 mL nebulizer solution Take 3 mL (1 25 mg total) by nebulization 3 (three) times a day (Patient taking differently: Take 1 25 mg by nebulization as needed)   • metoprolol succinate (TOPROL-XL) 50 mg 24 hr tablet Take 1 tablet (50 mg total) by mouth daily   • montelukast (SINGULAIR) 10 mg tablet Take 1 tablet (10 mg total) by mouth daily at bedtime   • potassium chloride (K-DUR,KLOR-CON) 20 mEq tablet Take 2 tablets (40 mEq total) by mouth 2 (two) times a day   • sitaGLIPtin (Januvia) 100 mg tablet Take 1/2 tab  daily   • tiotropium (Spiriva Respimat) 1 25 MCG/ACT AERS inhaler Inhale 2 puffs daily   • [DISCONTINUED] benzonatate (TESSALON PERLES) 100 mg capsule Take 1 capsule (100 mg total) by mouth 3 (three) times a day as needed (for cough)   • [DISCONTINUED] Potassium Chloride ER 20 MEQ TBCR Take 2 tablets by mouth 2 (two) times a day       Objective     /70 (BP Location: Left arm, Patient Position: Sitting, Cuff Size: Large)   Pulse 77   Temp 97 7 °F (36 5 °C) (Tympanic)   Resp 16   Ht 6' 1" (1 854 m)   Wt (!) 147 kg (324 lb 9 6 oz)   SpO2 98%   BMI 42 83 kg/m²     Physical Exam  Vitals and nursing note reviewed  Constitutional:       Appearance: Normal appearance  Comments: Morbidly obese   HENT:      Head: Normocephalic and atraumatic  Eyes:      Conjunctiva/sclera: Conjunctivae normal       Pupils: Pupils are equal, round, and reactive to light  Neck:      Vascular: No carotid bruit  Cardiovascular:      Rate and Rhythm: Normal rate and regular rhythm  Pulses: Pulses are weak  Dorsalis pedis pulses are 1+ on the right side and 1+ on the left side  Heart sounds: No murmur heard  Comments: BL LE edema 1+  Pulmonary:      Effort: Pulmonary effort is normal       Breath sounds: Normal breath sounds  No wheezing  Abdominal:      General: There is no distension  Palpations: Abdomen is soft  Tenderness: There is no abdominal tenderness  Musculoskeletal:         General: No swelling or tenderness  Normal range of motion  Cervical back: Normal range of motion and neck supple  Feet:      Right foot:      Skin integrity: No ulcer, skin breakdown, erythema, warmth, callus or dry skin  Left foot:      Skin integrity: No ulcer, skin breakdown, erythema, warmth, callus or dry skin  Skin:     General: Skin is warm and dry  Comments: Scaly erythematous patches on arms and  legs  Neurological:      General: No focal deficit present  Mental Status: He is alert  Psychiatric:         Mood and Affect: Mood normal          Patient's shoes and socks removed  Right Foot/Ankle   Right Foot Inspection  Skin Exam: skin normal and skin intact  No dry skin, no warmth, no callus, no erythema, no maceration, no abnormal color, no pre-ulcer, no ulcer and no callus  Toe Exam: No swelling, no tenderness, erythema and  no right toe deformity    Sensory   Monofilament testing: diminished    Vascular  The right DP pulse is 1+  Left Foot/Ankle  Left Foot Inspection  Skin Exam: skin normal and skin intact  No dry skin, no warmth, no erythema, no maceration, normal color, no pre-ulcer, no ulcer and no callus  Toe Exam: No swelling, no tenderness, no erythema and no left toe deformity  Sensory   Monofilament testing: diminished    Vascular  The left DP pulse is 1+       Assign Risk Category  No deformity present  Loss of protective sensation  Weak pulses  Risk: 2      Serena Chen MD

## 2022-12-21 NOTE — ASSESSMENT & PLAN NOTE
Lab Results   Component Value Date    HGBA1C 6 2 12/20/2022     HB A1C at goal   Continue Januvia 100 mg - 1/2 tab  daily  Check eye exam by ophthalmology annually  Follow-up with podiatry Dr Otto Dumont

## 2022-12-21 NOTE — ASSESSMENT & PLAN NOTE
Lab Results   Component Value Date    HGBA1C 6 2 12/20/2022     Discussed treatment options with patient  Will start Lyrica 50 mg at bedtime  Reviewed side effects  Recommended to call with update on symptoms in 10 - 14 days

## 2022-12-21 NOTE — ASSESSMENT & PLAN NOTE
Lipid panel improved since September 2022  Continue atorvastatin 10 mg daily  Follow a low-cholesterol diet, regular exercise

## 2022-12-21 NOTE — ASSESSMENT & PLAN NOTE
Continue Spiriva Respimat inhaler, Symbicort inhaler daily  Use Ventolin HFA inhaler, Xopenex via nebulizer PRN  Continue Singulair 10 mg at bedtime  Follow-up with pulmonology Dr Creston Felty on 12/27/22

## 2022-12-21 NOTE — ASSESSMENT & PLAN NOTE
Lab Results   Component Value Date    EGFR 48 11/22/2022    EGFR 50 09/19/2022    EGFR 48 09/07/2022    CREATININE 1 59 (H) 11/22/2022    CREATININE 1 53 (H) 09/19/2022    CREATININE 1 57 (H) 09/07/2022     Avoid NSAIDs, nephrotoxins  Follow-up with nephrology Dr Morris Seen in 3/2023

## 2022-12-21 NOTE — ASSESSMENT & PLAN NOTE
BP at goal   Continue Amlodipine 5 mg daily, Metoprolol ER 50 mg daily  Continue to monitor blood pressure at home  Follow-up with cardiology Dr Adriel Plunkett

## 2022-12-21 NOTE — ASSESSMENT & PLAN NOTE
Patient reports no heart palpitations  Continue beta-blocker, anticoagulation with Eliquis 5 mg twice daily as per cardiology

## 2022-12-21 NOTE — ASSESSMENT & PLAN NOTE
Wt Readings from Last 3 Encounters:   12/20/22 (!) 147 kg (324 lb 9 6 oz)   09/21/22 (!) 146 kg (321 lb 11 2 oz)   09/16/22 (!) 145 kg (319 lb)     Patient reports no worsening shortness of breath or leg edema  Weight has been stable  Continue Bumex 3 mg twice daily  Follow a low-sodium diet  Check daily weights  Follow-up with cardiology Dr Thalia Puente on 12/28/22

## 2022-12-22 ENCOUNTER — TELEPHONE (OUTPATIENT)
Dept: FAMILY MEDICINE CLINIC | Facility: CLINIC | Age: 57
End: 2022-12-22

## 2022-12-22 NOTE — TELEPHONE ENCOUNTER
Patient ([phone]) called in regards to recommendations for potassium chloride 20 meq tab  Patient states the after visit summery he received has instructions to stop medication  Patient states doctor did not discuss this at visit  Requests return call to confirm recommendations for medication

## 2022-12-22 NOTE — PROGRESS NOTES
Advanced Heart Failure Outpatient Progress Note - Joseph Clifton 56 y o  male MRN: [id number]    Encounter: [id number]      Assessment/Plan:    Patient Active Problem List    Diagnosis Date Noted   • Diabetic polyneuropathy associated with type 2 diabetes mellitus ([place] [address] ) 12/20/2022   • Stage 3a chronic kidney disease ([facility] ) 09/16/2022   • Paroxysmal atrial fibrillation ([facility] ) 03/01/2022   • Chronic diastolic heart failure ([place] [address] ) 11/12/2021   • Severe persistent asthma 10/11/2021   • Hyponatremia 07/31/2021   • Cardiomyopathy ([place] [address] ) 07/19/2021   • HNP (herniated nucleus pulposus), lumbar 02/23/2021   • Foraminal stenosis of lumbar region 02/23/2021   • VICKY (obstructive sleep apnea)    • Mixed hyperlipidemia 04/18/2019   • Primary osteoarthritis of both knees 06/14/2018   • Irritable bowel syndrome with diarrhea 01/30/2018   • Essential hypertension 01/30/2018   • Type 2 diabetes mellitus, without long-term current use of insulin ([place] [address] ) 01/30/2018   • Vitamin B12 deficiency 03/08/2016   • Vitamin D deficiency 03/08/2016   • Morbid obesity due to excess calories ([place] [address] ) 02/23/2016   • Primary osteoarthritis of right knee 10/15/2013     # Chronic HFpEF, Stage C, NYHA III   Euvolemic to mildly volume up  PAD 16 on transmission today     Weight:  312 lbs 6/24/22; 231 lbs 9/21/22; 326 lbs 12/28/22  NT proBNP: 609 4/8/22      Studies- personally reviewed by [facility] 9/6/22: No obstructive CAD    Pharm Nuc Stress 9/2/22: Abnormal study due to the presence of an inferior and inferolateral infarct without significant ischemia  [address] 3/9/22:   RA 4   RV 35/4   PA 35/12/21   PCWP 10   PA sat 64%   Travis CO 5 56 L/min   Travis CI 2 2L/min/m2   PVR 1 97 SAGASTUME     TTE 03/25/2020: LVEF 40-45%  LVIDd 6 12 cm  Normal RV     TTE 07/19/2021: LVEF 50%  LVIDd 6 13 cm  Grade 1 DD  Normal RV  Trace MR and TR  Mildly dilated IVC  TTE 11/12/2021: LVEF 55%  LVIDd 6 0 cm  Grade 1 DD  Normal RV  Trace TR        Neurohormonal Blockade:   --Beta Blocker: metoprolol succinate 50 mg daily     --ARNi / ACEi / ARB: No     --Aldosterone Antagonist: No     --SGLT2 Inhibitor: No    --Diuretic: bumex 3mg BID, postassium 40 meq BID    Sudden Cardiac Death Risk Reduction:   --LVEF >35%         Electrical Resynchronization:   --Candidacy for BiV device: narrow QRS       Advanced Therapies:   Will continue to monitor  LVEF recovered      # Atrial fibrillation   MPF2AK2MEUl = 3 (HF, HTN, DM)  Diagnosed 02/2022  Anticoagulation on Eliquis  Rate control: BB as above  Rhythm control: No       # Hypertension, controlled   Rx: amlodipine 5mg daily      # Hyperlipidemia   10/01/2021: cholesterol 157; TG 90; HDL 66; calculated LDL 73  9/5/22:  HDL 45 LDL 90  9/19/22:  HDL 64  - off lipitor for a few days  11/22/22:   HDL 55 LDL 85  Rx: atorvastatin 10 mg daily         # Diabetes mellitus, type II     Non-insulin dependent  HbA1c 6/16/22: 5 7%      # Obstructive sleep apnea  # Chronic respiratory failure   # Asthma, moderate persistent    # S/p gastric bypass (Samuel-en-Y)surgery    # History of tobacco abuse   # Carpal tunnel syndrome, bilateral    # CKD, Cr 1 43; 1 59 11/22/22  # s/p CardioMems implant 3/9/22     TODAY'S PLAN:  Take extra dose of bumex 3mg and potassium 40 meq tomorrow if weight still uptrending  Continue rest of cardiac medications  2g sodium diet  2L fluid restriction  Daily weights  Follow up on CPAP        HPI:   Arturo Briggs is a 61-year-old man with a PMH as above who presents to the office for follow-up      Admitted to [facility] from 12/23 to 01/01/2022 after presenting with worsening SOB  Started on IV Lasix (later switched to IV Bumex) and IV steroids  Did receive 2 doses of metolazone while inpatient  Transitioned to PO torsemide on day of discharge  Lost 12 lbs and net negative 15 L this admission       Presented to [facility] ED on 02/12/2022 with worsening SOB   Diagnosed with Afib and was started on Eliquis and BB  Also received 80 mg IV Lasix, and was discharged home       02/22/2022: Patient presents for hospital follow-up  Has been feeling "good" until yesterday  Developed TELLES and noted orthopnea overnight  Also with recently worsening of cough resulting in increased use of PRN inhalers/nebulizers  Denies recent dietary indiscretion  Drinking no more than 2000 mL daily  Is completing daily weights; reports down-trending weights over past few days  Has noted slight decrease in response to PO torsemide       Admitted to [facility] from 03/01 to 03/10/2022 after presenting with worsening SOB and LE swelling  Started on IV Lasix and IV steroids  Later was switched to IV Bumex on 03/04  Transitioned to PO Bumex on 03/07  CardioMEMS implanted on 03/09  Lost 7 lbs and net negative 8 1 L this admission       03/14/2022: Patient presents for hospital follow-up  No current complaints  Weights stable at home in low 310s lbs range  No issues with CardioMEMS system at home  Still waiting for new CPAP machine       Presented to [facility] ED on 04/08 at the direction of his pulmonologist due to worsening SOB and TELLES, productive cough, and wheezing  In ED received albuterol nebulizer treatment  CXR without acute cardiopulmonary disease  Heart failure team recommended increasing Bumex to 2 mg qAM and 1 mg qPM with close outpatient follow-up       Contacted by HF RN on 04/08 to review diuretic dosing changes  Patient reported no improvement since ED visit and presented to [address] [place] [address] ED for second opinion/further evaluation  Admitted to [address] [place] [address] from 04/08 to 04/11/2022  Started on IV steroids and IV Lasix  Diagnosed with acute asthma and HF exacerbations  Lost 2 lbs this admission  Discharged on PO steroid taper      04/15/2022: Patient presents for hospital follow-up appointment  Reviewed recent hospital course(s)  No current complaints   Has continued taking Bumex 2 mg qAm and 1 mg qPM  Is completing daily weights and denies any significant weight gain since returning home  Still waiting on having new CPAP machine delivered  Planning to return to work next week    6/24/22: Here for follow up  Overall doing well  Recent treatments for asthma exacerbation  Last steroid course 3 weeks ago  He is overall doing well  No shortness of breath, PND or orthopnea  Mild lower extremity edema  Home scale not working  Recent CardioMems PADs trending up     7/28/22: Here for urgent visit due to worsening shortness of breath, weight gain and lower extremity edema  Noted symptoms since 7/25  Was getting extra doses of bumex with up to 2mg TID yesterday with no significant response  Correlates with PADs above goal  Reports adherence to diet and meds  9/21/22: Here for follow up  Admitted for chest pain and shortness of breath 9/1/22  Euvolemic on exam  Troponins negative  Pharmacological nuclear stress test showed inferior inferolateral infarct without significant ischemia  Cardiac catheterization done showed no obstructive CAD  Also treated for asthma exacerbation and given course of steroids with improvement in symptoms  He is overall feeling well today, breathing better and doing rehab  Interval History:  12/28/22: Here for follow up  Overall doing well  Last dose of additional bumex last month  Recent uptrend in weight related to diet  No shortness of breath, orthopnea or chest pain  No recent asthma exacerbations  Stable lower extremity edema  PAD today 16, goal 15  Review of Systems   Constitutional: Negative for chills and fever  HENT: Negative for ear pain and sore throat  Eyes: Negative for pain and visual disturbance  Respiratory: Negative for cough and shortness of breath  Cardiovascular: Negative for chest pain and palpitations  Gastrointestinal: Negative for abdominal distention, abdominal pain and vomiting  Genitourinary: Negative for dysuria and hematuria     Musculoskeletal: Negative for arthralgias and back pain  Skin: Negative for color change and rash  Neurological: Negative for dizziness, seizures, syncope and light-headedness  All other systems reviewed and are negative  Past Medical History:   Diagnosis Date   • Acute gastritis without hemorrhage     last assessed 3/24/17   • Asthma    • Bilateral leg edema 2/19/2020   • CHF (congestive heart failure) ([facility])    • Daytime sleepiness 7/17/2019   • Diabetes mellitus ([facility] )    • Dyspnea on exertion 10/11/2021   • Edema of both feet 1/23/2020   • Gastric bypass status for obesity    • Hyperlipidemia    • Hypertension    • Hypokalemia 11/14/2021   • Impaired fasting glucose     last assessed 5/10/17   • Knee sprain, bilateral 6/14/2018   • Leg cramps 6/16/2022   • Obesity    • Viral gastroenteritis     last assessed 11/4/16         Allergies   Allergen Reactions   • Azithromycin Other (See Comments)     Shaky, uneasy feeling    • Bactrim [Sulfamethoxazole-Trimethoprim] Other (See Comments)     shakiness           Current Outpatient Medications:   •  Accu-Chek FastClix Lancets MISC, Test blood sugar twice daily, Disp: 200 each, Rfl: 3  •  albuterol (PROVENTIL HFA,VENTOLIN HFA) 90 mcg/act inhaler, INHALE 2 PUFFS EVERY 4 (FOUR) HOURS AS NEEDED FOR WHEEZING OR SHORTNESS OF BREATH, Disp: 18 g, Rfl: 3  •  amLODIPine (NORVASC) 5 mg tablet, Take 1 tablet (5 mg total) by mouth daily, Disp: 90 tablet, Rfl: 2  •  apixaban (Eliquis) 5 mg, Take 1 tablet (5 mg total) by mouth 2 (two) times a day, Disp: 60 tablet, Rfl: 5  •  atorvastatin (LIPITOR) 10 mg tablet, Take 1 tablet (10 mg total) by mouth daily, Disp: 30 tablet, Rfl: 5  •  benzonatate (TESSALON PERLES) 100 mg capsule, Take 1 capsule (100 mg total) by mouth 3 (three) times a day as needed (for cough), Disp: 30 capsule, Rfl: 0  •  Blood Glucose Monitoring Suppl (Accu-Chek Guide) w/Device KIT, Use 2 (two) times a day Test blood sugar twice daily, Disp: 1 kit, Rfl: 0  •  budesonide-formoterol (Symbicort) 160-4 5 mcg/act inhaler, Inhale 2 puffs 2 (two) times a day Rinse mouth after use , Disp: 30 6 g, Rfl: 2  •  bumetanide (BUMEX) 1 mg tablet, Take 3 tablets (3 mg total) by mouth 2 (two) times a day, Disp: 180 tablet, Rfl: 3  •  levalbuterol (Xopenex) 1 25 mg/3 mL nebulizer solution, Take 3 mL (1 25 mg total) by nebulization 3 (three) times a day (Patient taking differently: Take 1 25 mg by nebulization as needed), Disp: 270 mL, Rfl: 3  •  metoprolol succinate (TOPROL-XL) 50 mg 24 hr tablet, Take 1 tablet (50 mg total) by mouth daily, Disp: 90 tablet, Rfl: 3  •  montelukast (SINGULAIR) 10 mg tablet, Take 1 tablet (10 mg total) by mouth daily at bedtime, Disp: 90 tablet, Rfl: 1  •  potassium chloride (K-DUR,KLOR-CON) 20 mEq tablet, Take 2 tablets (40 mEq total) by mouth 2 (two) times a day, Disp: 120 tablet, Rfl: 5  •  pregabalin (LYRICA) 50 mg capsule, Take 1 caps   at bedtime, Disp: 30 capsule, Rfl: 5  •  sitaGLIPtin (Januvia) 100 mg tablet, Take 1/2 tab  daily, Disp: 30 tablet, Rfl: 5  •  tiotropium (Spiriva Respimat) 1 25 MCG/ACT AERS inhaler, Inhale 2 puffs daily, Disp: 4 g, Rfl: 3    Social History     Socioeconomic History   • Marital status: /Civil Union     Spouse name: Not on file   • Number of children: Not on file   • Years of education: Not on file   • Highest education level: Not on file   Occupational History   • Not on file   Tobacco Use   • Smoking status: Former     Types: Cigars   • Smokeless tobacco: Never   • Tobacco comments:     cigar once a day   Vaping Use   • Vaping Use: Never used   Substance and Sexual Activity   • Alcohol use: Not Currently     Alcohol/week: 2 0 standard drinks     Types: 2 Shots of liquor per week     Comment: social   • Drug use: No   • Sexual activity: Yes     Partners: Female     Birth control/protection: None   Other Topics Concern   • Not on file   Social History Narrative   • Not on file     Social Determinants of Health     Financial Resource Strain: Not on file   Food Insecurity: No Food Insecurity   • Worried About [address] in the Last Year: Never true   • Ran Out of Food in the Last Year: Never true   Transportation Needs: No Transportation Needs   • Lack of Transportation (Medical): No   • Lack of Transportation (Non-Medical): No   Physical Activity: Not on file   Stress: Not on file   Social Connections: Not on file   Intimate Partner Violence: Not on file   Housing Stability: Low Risk    • Unable to Pay for Housing in the Last Year: No   • Number of Places Lived in the Last Year: 1   • Unstable Housing in the Last Year: No       Family History   Problem Relation Age of Onset   • Stroke Mother    • Hypertension Father    • Cancer Father    • COPD Father        Physical Exam:    Vitals:   Vitals:    12/28/22 1051   BP: 110/70   Pulse: 80   SpO2: 97%       Physical Exam  Constitutional:       General: He is not in acute distress  Appearance: Normal appearance  HENT:      Head: Normocephalic and atraumatic  Mouth/Throat:      Mouth: Mucous membranes are moist    Eyes:      General: No scleral icterus  Extraocular Movements: Extraocular movements intact  Cardiovascular:      Rate and Rhythm: Normal rate and regular rhythm  Pulses: Normal pulses  Heart sounds: S1 normal and S2 normal  No murmur heard  No friction rub  No gallop  Comments: Nonelevated JVP, nonpitting edema  Pulmonary:      Effort: Pulmonary effort is normal       Breath sounds: Normal breath sounds  Abdominal:      General: There is no distension  Palpations: Abdomen is soft  Tenderness: There is no abdominal tenderness  There is no guarding or rebound  Musculoskeletal:         General: Normal range of motion  Cervical back: Neck supple  Skin:     General: Skin is warm and dry  Capillary Refill: Capillary refill takes less than 2 seconds  Neurological:      General: No focal deficit present        Mental Status: He is alert and oriented to person, place, and time  Psychiatric:         Mood and Affect: Mood normal          Labs & Results:    Lab Results   Component Value Date    SODIUM 137 11/22/2022    K 4 1 11/22/2022     11/22/2022    CO2 30 11/22/2022    BUN 25 11/22/2022    CREATININE 1 59 (H) 11/22/2022    GLUC 158 (H) 09/07/2022    CALCIUM 9 3 11/22/2022     Lab Results   Component Value Date    WBC 9 09 12/12/2022    HGB 12 0 12/12/2022    HCT 38 1 12/12/2022    MCV 91 12/12/2022     12/12/2022     Lab Results   Component Value Date    NTBNP 2,545 (H) 09/01/2022      Lab Results   Component Value Date    CHOLESTEROL 165 11/22/2022    CHOLESTEROL 235 (H) 09/19/2022    CHOLESTEROL 163 09/05/2022     Lab Results   Component Value Date    HDL 55 11/22/2022    HDL 64 09/19/2022    HDL 45 09/05/2022     Lab Results   Component Value Date    TRIG 127 11/22/2022    TRIG 198 (H) 09/19/2022    TRIG 138 09/05/2022     Lab Results   Component Value Date    NONHDLC 110 11/22/2022    [place] 171 09/19/2022    [place] 118 09/05/2022       EKG personally reviewed by Siddharth Nelson  Counseling / Coordination of Care  Time spent today 25 minutes  Greater than 50% of total time was spent with the patient and / or family counseling and / or coordination of care  We went over current diagnosis, most recent studies and any changes in treatment  Thank you for the opportunity to participate in the care of this patient      Evelin Robbins MD  ADVANCED HEART FAILURE AND MECHANICAL CIRCULATORY SUPPORT  [facility]

## 2022-12-27 ENCOUNTER — OFFICE VISIT (OUTPATIENT)
Dept: PULMONOLOGY | Facility: CLINIC | Age: 57
End: 2022-12-27

## 2022-12-27 VITALS
SYSTOLIC BLOOD PRESSURE: 118 MMHG | TEMPERATURE: 97.2 F | HEART RATE: 114 BPM | WEIGHT: 315 LBS | OXYGEN SATURATION: 98 % | HEIGHT: 73 IN | BODY MASS INDEX: 41.75 KG/M2 | DIASTOLIC BLOOD PRESSURE: 66 MMHG

## 2022-12-27 DIAGNOSIS — G47.33 OSA (OBSTRUCTIVE SLEEP APNEA): Primary | Chronic | ICD-10-CM

## 2022-12-27 DIAGNOSIS — E66.01 MORBID OBESITY DUE TO EXCESS CALORIES (HCC): Chronic | ICD-10-CM

## 2022-12-27 DIAGNOSIS — J45.50 SEVERE PERSISTENT ASTHMA WITHOUT COMPLICATION: ICD-10-CM

## 2022-12-27 NOTE — ASSESSMENT & PLAN NOTE
Patient has a history of severe persistent asthma  His last exacerbation occurred few months ago  Today he is doing very well  Lungs are clear on auscultation today  He is interested in biological therapy  Recent CBC shows 7% eosinophilia and also total IgE was greater than 400  Plan  Continue Spiriva, Symbicort, Singulair albuterol  Increase exercise as permitted  Will complete prior authorization for [employer]um  If ineffective could consider Xolair      He will follow-up in 3 months

## 2022-12-27 NOTE — ASSESSMENT & PLAN NOTE
No evidence of decompensation  Continue heart failure medications  Continue follow-up with PCP and cardiology

## 2022-12-27 NOTE — ASSESSMENT & PLAN NOTE
States he is following a lower salt diet  He has not lost any weight yet but intends to do so  Weight loss exercise and dietary modification is encouraged  Hopefully can increase exercise tolerance as asthma is better controlled

## 2022-12-27 NOTE — PROGRESS NOTES
Pulmonary Follow Up Note   Pelon Crowley 56 y o  male MRN: [id number]  12/27/2022      Assessment:    Morbid obesity due to excess calories ([facility] )  States he is following a lower salt diet  He has not lost any weight yet but intends to do so  Weight loss exercise and dietary modification is encouraged  Hopefully can increase exercise tolerance as asthma is better controlled  VICKY (obstructive sleep apnea)  Currently not using Pap therapy  Feels that he is doing okay without it  Severe persistent asthma  Patient has a history of severe persistent asthma  His last exacerbation occurred few months ago  Today he is doing very well  Lungs are clear on auscultation today  He is interested in biological therapy  Recent CBC shows 7% eosinophilia and also total IgE was greater than 400  Plan  Continue Spiriva, Symbicort, Singulair albuterol  Increase exercise as permitted  Will complete prior authorization for Vick Bunn  If ineffective could consider Xolair  He will follow-up in 3 months    Cardiomyopathy ([facility] [facility] )  No evidence of decompensation  Continue heart failure medications  Continue follow-up with PCP and cardiology  Plan:    Diagnoses and all orders for this visit:    VICKY (obstructive sleep apnea)    Severe persistent asthma without complication    Morbid obesity due to excess calories (HCC)        No follow-ups on file  History of Present Illness   HPI:  Pelon Crowley is a 56 y o  male history of systolic congestive heart failure, morbid obesity (status post gastric bypass 2004), obstructive sleep apnea (on CPAP), moderate persistent asthma, psoriasis, type 2 diabetes  Here today for follow-up  Overall states he is doing pretty good  Continues Spiriva, Symbicort and albuterol  Uses albuterol 2-3 times per day  He has not had any recent exacerbations  He does wake up in the morning and feels congested in his throat and chest  He is exercising by walking   He states he is eating a lower salt diet  He has not lost any weight  Currently not using PAP therapy  States he is doing good w/o it  Recent CBC showed 7% eosinophilia and total IgE of greater than 400  He is interested in biological treatment  He does not have any acute complaints today  Denies any lower extremity edema or excessive weight gain  Due to follow-up with his cardiologist and primary care physician  Review of Systems   Constitutional: Negative for chills, fatigue and fever  HENT: Negative for congestion, nosebleeds, postnasal drip, rhinorrhea, sinus pressure and sore throat  Eyes: Negative for discharge, redness and itching  Respiratory: Positive for shortness of breath and wheezing  Negative for cough, choking, chest tightness and stridor  Cardiovascular: Negative for chest pain, palpitations and leg swelling  Gastrointestinal: Negative for blood in stool  Genitourinary: Negative for difficulty urinating and dysuria  Musculoskeletal: Negative for arthralgias, joint swelling and myalgias  Skin: Negative for color change and rash  Neurological: Negative for light-headedness and headaches  Hematological: Negative for adenopathy     Answers for HPI/ROS submitted by the patient on 12/20/2022  Do you have shortness of breath that occurs with effort or exertion?: Yes  Do you have ear congestion?: No  Do you have heartburn?: No  Do you have fatigue?: No  Do you have nasal congestion?: Yes  Do you have shortness of breath when lying flat?: No  Do you have shortness of breath when you wake up?: Yes  Do you have sweats?: No  Have you experienced weight loss?: No        Historical Information   Past Medical History:   Diagnosis Date   • Acute gastritis without hemorrhage     last assessed 3/24/17   • Asthma    • Bilateral leg edema 2/19/2020   • CHF (congestive heart failure) ([facility] )    • Daytime sleepiness 7/17/2019   • Diabetes mellitus ([facility] )    • Dyspnea on exertion 10/11/2021   • Edema of both feet 1/23/2020   • Gastric bypass status for obesity    • Hyperlipidemia    • Hypertension    • Hypokalemia 11/14/2021   • Impaired fasting glucose     last assessed 5/10/17   • Knee sprain, bilateral 6/14/2018   • Leg cramps 6/16/2022   • Obesity    • Viral gastroenteritis     last assessed 11/4/16     Past Surgical History:   Procedure Laterality Date   • CARDIAC CATHETERIZATION N/A 3/9/2022    Procedure: CARDIAC RHC W/ PRESSURE SENSOR;  Surgeon: Viviana Omalley MD;  Location: [facility]; Service: Cardiology   • CARDIAC CATHETERIZATION N/A 9/6/2022    Procedure: Cardiac catheterization;  Surgeon: Hugh Barriga DO;  Location: [facility]; Service: Cardiology   • CARDIAC CATHETERIZATION N/A 9/6/2022    Procedure: Cardiac Coronary Angiogram;  Surgeon: Hugh Barriga DO;  Location: [facility];   Service: Cardiology   • CARPAL TUNNEL RELEASE      bilateral   • GASTRIC BYPASS  2004    with han en y   • HERNIA REPAIR      ventral inscisional   • TONSILLECTOMY       Family History   Problem Relation Age of Onset   • Stroke Mother    • Hypertension Father    • Cancer Father    • COPD Father          Meds/Allergies     Current Outpatient Medications:   •  Accu-Chek FastClix Lancets MISC, Test blood sugar twice daily, Disp: 200 each, Rfl: 3  •  albuterol (PROVENTIL HFA,VENTOLIN HFA) 90 mcg/act inhaler, INHALE 2 PUFFS EVERY 4 (FOUR) HOURS AS NEEDED FOR WHEEZING OR SHORTNESS OF BREATH, Disp: 18 g, Rfl: 3  •  amLODIPine (NORVASC) 5 mg tablet, Take 1 tablet (5 mg total) by mouth daily, Disp: 90 tablet, Rfl: 2  •  apixaban (Eliquis) 5 mg, Take 1 tablet (5 mg total) by mouth 2 (two) times a day, Disp: 60 tablet, Rfl: 5  •  atorvastatin (LIPITOR) 10 mg tablet, Take 1 tablet (10 mg total) by mouth in the morning , Disp: 30 tablet, Rfl: 5  •  benzonatate (TESSALON PERLES) 100 mg capsule, Take 1 capsule (100 mg total) by mouth 3 (three) times a day as needed (for cough), Disp: 30 capsule, Rfl: 0  • Blood Glucose Monitoring Suppl (Accu-Chek Guide) w/Device KIT, Use 2 (two) times a day Test blood sugar twice daily, Disp: 1 kit, Rfl: 0  •  budesonide-formoterol (Symbicort) 160-4 5 mcg/act inhaler, Inhale 2 puffs 2 (two) times a day Rinse mouth after use , Disp: 30 6 g, Rfl: 2  •  bumetanide (BUMEX) 1 mg tablet, Take 3 tablets (3 mg total) by mouth 2 (two) times a day, Disp: 180 tablet, Rfl: 3  •  levalbuterol (Xopenex) 1 25 mg/3 mL nebulizer solution, Take 3 mL (1 25 mg total) by nebulization 3 (three) times a day (Patient taking differently: Take 1 25 mg by nebulization as needed), Disp: 270 mL, Rfl: 3  •  metoprolol succinate (TOPROL-XL) 50 mg 24 hr tablet, Take 1 tablet (50 mg total) by mouth daily, Disp: 90 tablet, Rfl: 3  •  montelukast (SINGULAIR) 10 mg tablet, Take 1 tablet (10 mg total) by mouth daily at bedtime, Disp: 90 tablet, Rfl: 1  •  potassium chloride (K-DUR,KLOR-CON) 20 mEq tablet, Take 2 tablets (40 mEq total) by mouth 2 (two) times a day, Disp: 120 tablet, Rfl: 5  •  pregabalin (LYRICA) 50 mg capsule, Take 1 caps  at bedtime, Disp: 30 capsule, Rfl: 5  •  sitaGLIPtin (Januvia) 100 mg tablet, Take 1/2 tab  daily, Disp: 30 tablet, Rfl: 5  •  tiotropium (Spiriva Respimat) 1 25 MCG/ACT AERS inhaler, Inhale 2 puffs daily, Disp: 4 g, Rfl: 3  Allergies   Allergen Reactions   • Azithromycin Other (See Comments)     Shaky, uneasy feeling    • Bactrim [Sulfamethoxazole-Trimethoprim] Other (See Comments)     shakiness       Vitals: Blood pressure 118/66, pulse (!) 114, temperature (!) 97 2 °F (36 2 °C), temperature source Tympanic, height 6' 1" (1 854 m), weight (!) 147 kg (324 lb), SpO2 98 %  Body mass index is 42 75 kg/m²  Oxygen Therapy  SpO2: 98 %  Oxygen Therapy: None (Room air)      Physical Exam  Physical Exam  Vitals reviewed  Constitutional:       General: He is not in acute distress  Appearance: He is well-developed  He is not ill-appearing, toxic-appearing or diaphoretic     HENT: Head: Normocephalic and atraumatic  Right Ear: External ear normal       Left Ear: External ear normal       Nose: Nose normal  No congestion or rhinorrhea  Mouth/Throat:      Pharynx: No oropharyngeal exudate or posterior oropharyngeal erythema  Eyes:      General: No scleral icterus  Right eye: No discharge  Left eye: No discharge  Conjunctiva/sclera: Conjunctivae normal       Pupils: Pupils are equal, round, and reactive to light  Neck:      Trachea: No tracheal deviation  Cardiovascular:      Rate and Rhythm: Normal rate and regular rhythm  Heart sounds: Normal heart sounds  No murmur heard  No friction rub  No gallop  Pulmonary:      Effort: Pulmonary effort is normal       Breath sounds: Normal breath sounds  No stridor  No wheezing or rales  Musculoskeletal:      Cervical back: Normal range of motion  Right lower leg: No edema  Left lower leg: No edema  Lymphadenopathy:      Head:      Right side of head: No submental, submandibular, preauricular or posterior auricular adenopathy  Left side of head: No submental, submandibular, preauricular or posterior auricular adenopathy  Cervical: No cervical adenopathy  Skin:     General: Skin is warm and dry  Findings: No lesion or rash  Neurological:      Mental Status: He is alert and oriented to person, place, and time  Labs: I have personally reviewed pertinent lab results    Lab Results   Component Value Date    WBC 9 09 12/12/2022    HGB 12 0 12/12/2022    HCT 38 1 12/12/2022    MCV 91 12/12/2022     12/12/2022     Lab Results   Component Value Date    CALCIUM 9 3 11/22/2022    K 4 1 11/22/2022    CO2 30 11/22/2022     11/22/2022    BUN 25 11/22/2022    CREATININE 1 59 (H) 11/22/2022     Lab Results   Component Value Date     (H) 12/12/2022     Lab Results   Component Value Date    ALT 41 09/19/2022    AST 25 09/19/2022    ALKPHOS 204 (H) 09/19/2022 Imaging and other studies: I have personally reviewed pertinent reports  and I have personally reviewed pertinent films in PACS     11/9/2021   FINDINGS:     Cardiomediastinal silhouette appears unremarkable      The lungs are clear  No pneumothorax or pleural effusion      Osseous structures appear within normal limits for patient age      IMPRESSION:     No acute cardiopulmonary disease       Chest x-ray September 22, 2020  IMPRESSION:     No acute cardiopulmonary disease        Pulmonary function testing:  October 22, 2021    Results:  FVC: 3 31 L       61 % predicted  FEV1: 1 63 L     39 % predicted  FEV1/FVC Ratio: 49 %     After administration of bronchodilator   FVC: 3 89 L, 72 % predicted, 17 % change  FEV1: 2 45 L, 58 % predicted, 50 % change     Lung volumes by body plethysmography:   Total Lung Capacity 98 % predicted   Residual volume 183 % predicted     DLCO corrected for patients hemoglobin level: 65 %     Interpretation:     · Severe obstructive airflow defect on spirometry     · Significant improvement in airflow or forced vital capacity in response to the administration of bronchodilator per ATS standards      · Air trapping as indicated by the lung volumes     · Mild decrease in diffusion capacity     · Flow-volume loop consistent with obstruction    EKG, Pathology, and Other Studies: I have personally reviewed pertinent reports  and I have personally reviewed pertinent films in PACS    Echocardiogram July 2021   Left ventricular ejection fraction 99%, grade 1 diastolic dysfunction, mild left atrial dilation, normal RV size and function, no valvular stenosis  MARLIN Gan    [facility]  Answers for HPI/ROS submitted by the patient on 10/4/2021  Do you experience frequent throat clearing?: Yes  Chronicity: recurrent  When did you first notice your symptoms?: more than 1 month ago  How often do your symptoms occur?: daily  Since you first noticed this problem, how has it changed?: gradually improving  Do you have shortness of breath that occurs with effort or exertion?: Yes  Do you have ear congestion?: No  Do you have heartburn?: No  Do you have fatigue?: Yes  Do you have nasal congestion?: No  Do you have shortness of breath when lying flat?: Yes  Do you have shortness of breath when you wake up?: Yes  Do you have sweats?: No  Have you experienced weight loss?: No  Which of the following makes your symptoms worse?: any activity, exercise, lying down, minimal activity  Which of the following makes your symptoms better?: change in position, diuretics, rest, steroid inhaler      Answers for HPI/ROS submitted by the patient on 9/6/2022  Do you have difficulty breathing?: Yes  Chronicity: recurrent  When did you first notice your symptoms?: 1 to 4 weeks ago  How often do your symptoms occur?: daily  Since you first noticed this problem, how has it changed?: unchanged  Do you have shortness of breath that occurs with effort or exertion?: Yes  Do you have ear congestion?: No  Do you have heartburn?: No  Do you have fatigue?: No  Do you have nasal congestion?: No  Do you have shortness of breath when lying flat?: Yes  Do you have shortness of breath when you wake up?: Yes  Which of the following makes your symptoms worse?: any activity, climbing stairs, lying down, minimal activity  Which of the following makes your symptoms better?: diuretics, oral steroids, steroid inhaler      Answers for HPI/ROS submitted by the patient on 9/16/2022  Do you have difficulty breathing?: Yes  Chronicity: chronic  When did you first notice your symptoms?: 1 to 4 weeks ago  How often do your symptoms occur?: daily  Since you first noticed this problem, how has it changed?: gradually improving  Do you have shortness of breath that occurs with effort or exertion?: No  Do you have ear congestion?: No  Do you have heartburn?: No  Do you have fatigue?: No  Do you have nasal congestion?: No  Do you have shortness of breath when lying flat?: No  Do you have shortness of breath when you wake up?: No  Do you have sweats?: No  Have you experienced weight loss?: No  Which of the following makes your symptoms worse?: any activity, climbing stairs, lying down  Which of the following makes your symptoms better?: rest

## 2022-12-28 ENCOUNTER — OFFICE VISIT (OUTPATIENT)
Dept: CARDIOLOGY CLINIC | Facility: CLINIC | Age: 57
End: 2022-12-28

## 2022-12-28 VITALS
SYSTOLIC BLOOD PRESSURE: 110 MMHG | WEIGHT: 315 LBS | DIASTOLIC BLOOD PRESSURE: 70 MMHG | OXYGEN SATURATION: 97 % | BODY MASS INDEX: 41.75 KG/M2 | HEART RATE: 80 BPM | HEIGHT: 73 IN

## 2022-12-28 DIAGNOSIS — E78.2 MIXED HYPERLIPIDEMIA: ICD-10-CM

## 2022-12-28 DIAGNOSIS — N18.31 STAGE 3A CHRONIC KIDNEY DISEASE (HCC): ICD-10-CM

## 2022-12-28 DIAGNOSIS — I50.32 CHRONIC HEART FAILURE WITH PRESERVED EJECTION FRACTION (HCC): Primary | ICD-10-CM

## 2022-12-28 DIAGNOSIS — I48.0 PAROXYSMAL ATRIAL FIBRILLATION (HCC): ICD-10-CM

## 2022-12-28 RX ORDER — ATORVASTATIN CALCIUM 10 MG/1
10 TABLET, FILM COATED ORAL DAILY
Qty: 30 TABLET | Refills: 5 | Status: SHIPPED | OUTPATIENT
Start: 2022-12-28

## 2022-12-28 NOTE — PATIENT INSTRUCTIONS
Continue daily weights   Take extra dose of bumex 3mg and potassium 40 meq tomorrow if weight still uptrending  Continue rest of cardiac medications  Daily weights  2g sodium diet  2L fluid restriction  CardioMems daily transmissions

## 2022-12-31 DIAGNOSIS — I50.33 ACUTE ON CHRONIC HEART FAILURE WITH PRESERVED EJECTION FRACTION (HCC): ICD-10-CM

## 2023-01-01 ENCOUNTER — OFFICE VISIT (OUTPATIENT)
Dept: CARDIOLOGY CLINIC | Facility: CLINIC | Age: 58
End: 2023-01-01

## 2023-01-01 DIAGNOSIS — I50.32 CHRONIC DIASTOLIC HEART FAILURE (HCC): Primary | Chronic | ICD-10-CM

## 2023-01-03 RX ORDER — BUMETANIDE 1 MG/1
TABLET ORAL
Qty: 180 TABLET | Refills: 2 | Status: SHIPPED | OUTPATIENT
Start: 2023-01-03

## 2023-01-12 ENCOUNTER — TELEPHONE (OUTPATIENT)
Dept: PULMONOLOGY | Facility: CLINIC | Age: 58
End: 2023-01-12

## 2023-01-12 NOTE — TELEPHONE ENCOUNTER
I have not received any information about starting an new auth for the patient  Was the forms filled out?

## 2023-01-12 NOTE — TELEPHONE ENCOUNTER
I thought that he filled out the forms during his last visit  If not, then I would like him to complete them so that we can start Kalia   thanks

## 2023-01-12 NOTE — TELEPHONE ENCOUNTER
Patient is calling in regards to his Fasenra injections and if he is going to be approved or not   Please advise

## 2023-01-19 ENCOUNTER — TELEPHONE (OUTPATIENT)
Dept: CARDIOLOGY CLINIC | Facility: CLINIC | Age: 58
End: 2023-01-19

## 2023-01-27 DIAGNOSIS — E78.2 MIXED HYPERLIPIDEMIA: ICD-10-CM

## 2023-01-27 RX ORDER — ATORVASTATIN CALCIUM 10 MG/1
TABLET, FILM COATED ORAL
Qty: 90 TABLET | Refills: 2 | Status: SHIPPED | OUTPATIENT
Start: 2023-01-27

## 2023-01-30 DIAGNOSIS — J45.50 SEVERE PERSISTENT ASTHMA WITHOUT COMPLICATION: Primary | ICD-10-CM

## 2023-02-01 ENCOUNTER — OFFICE VISIT (OUTPATIENT)
Dept: CARDIOLOGY CLINIC | Facility: CLINIC | Age: 58
End: 2023-02-01

## 2023-02-01 DIAGNOSIS — I50.32 CHRONIC DIASTOLIC HEART FAILURE (HCC): Primary | Chronic | ICD-10-CM

## 2023-02-05 DIAGNOSIS — J45.41 MODERATE PERSISTENT ASTHMA WITH ACUTE EXACERBATION: ICD-10-CM

## 2023-02-05 RX ORDER — TIOTROPIUM BROMIDE INHALATION SPRAY 1.56 UG/1
SPRAY, METERED RESPIRATORY (INHALATION)
Qty: 4 G | Refills: 5 | Status: SHIPPED | OUTPATIENT
Start: 2023-02-05

## 2023-02-17 ENCOUNTER — APPOINTMENT (OUTPATIENT)
Dept: OCCUPATIONAL THERAPY | Facility: CLINIC | Age: 58
End: 2023-02-17

## 2023-02-21 ENCOUNTER — TELEPHONE (OUTPATIENT)
Dept: CARDIOLOGY CLINIC | Facility: CLINIC | Age: 58
End: 2023-02-21

## 2023-02-21 DIAGNOSIS — T78.2XXA ANAPHYLAXIS, INITIAL ENCOUNTER: Primary | ICD-10-CM

## 2023-02-21 RX ORDER — EPINEPHRINE 0.3 MG/.3ML
0.3 INJECTION SUBCUTANEOUS ONCE
Qty: 0.6 ML | Refills: 0 | Status: SHIPPED | OUTPATIENT
Start: 2023-02-21 | End: 2023-02-21

## 2023-02-21 RX ORDER — EPINEPHRINE 0.3 MG/.3ML
0.3 INJECTION SUBCUTANEOUS ONCE
Qty: 0.6 ML | Refills: 0 | Status: CANCELLED | OUTPATIENT
Start: 2023-02-21 | End: 2023-02-21

## 2023-03-02 DIAGNOSIS — I50.32 CHRONIC DIASTOLIC HEART FAILURE (HCC): Primary | Chronic | ICD-10-CM

## 2023-03-02 DIAGNOSIS — I50.32 CHRONIC DIASTOLIC HEART FAILURE (HCC): Primary | ICD-10-CM

## 2023-03-03 ENCOUNTER — CLINICAL SUPPORT (OUTPATIENT)
Dept: CARDIOLOGY CLINIC | Facility: CLINIC | Age: 58
End: 2023-03-03

## 2023-03-03 DIAGNOSIS — I50.32 CHRONIC DIASTOLIC HEART FAILURE (HCC): Primary | ICD-10-CM

## 2023-03-06 ENCOUNTER — TELEPHONE (OUTPATIENT)
Dept: NEPHROLOGY | Facility: CLINIC | Age: 58
End: 2023-03-06

## 2023-03-08 ENCOUNTER — TELEPHONE (OUTPATIENT)
Dept: NEPHROLOGY | Facility: CLINIC | Age: 58
End: 2023-03-08

## 2023-03-09 NOTE — ASSESSMENT & PLAN NOTE
Continue Atorvastatin 10 mg daily  PT DAILY TREATMENT NOTE 2-15    Patient Name: Antolin Favorite  Date:3/9/2023  : 1959  [x]  Patient  Verified  Payor: Beatris Wolfe / Plan: [id number] HMO/CHOICE PLUS/POS / Product Type: HMO /    In time: 8:35 AM  Out time:9:35 AM  Total Treatment Time (min): 60  Visit #:  12 of 12    Treatment Area: Lumbar facet arthropathy [M47.816]    SUBJECTIVE  Pain Level (0-10 scale): 1/10  Any medication changes, allergies to medications, adverse drug reactions, diagnosis change, or new procedure performed?: [x] No    [] Yes (see summary sheet for update)  Subjective functional status/changes:     \"I am returning to work at least 1 time a week. \" Return to MD on 2023. I am doing better  standing to perform my household chores. OBJECTIVE    Modality rationale: decrease pain and increase tissue extensibility to improve the patients ability  to stand at least at hour with no rest periods to perform household chores.    Min Type Additional Details   15 [x] Estim: []Att   [x]Unatt        []TENS instruct                  [x]IFC  []Premod   []NMES                     []Other:  []w/US   []w/ice   [x]w/heat  Position: sitting  Location: lumbar    []  Traction: [] Cervical       []Lumbar                       [] Prone          []Supine                       []Intermittent   []Continuous Lbs:  [] before manual  [] after manual  [] With heat  [] Simultaneously performed with E-Stim    []  Ultrasound: []Continuous   [] Pulsed                           []1MHz   []3MHz Location:  W/cm2:    []  Ice     []  heat  []  Ice massage Position:  Location:    []  Vasopneumatic Device  If using vaso (only need to measure limb vaso being performed on)      pre-treatment girth :       post-treatment girth :       measured at (landmark location)  Pressure:       [] lo [] med [] hi   Temperature: [] lo [] med [] Hi    [] With ice    [] Simultaneously performed with Estim   [x] Skin assessment post-treatment: [x]intact [x]redness- no adverse reaction       []redness - adverse reaction:       45 min Therapeutic Exercise:  [x] See flow sheet :   Rationale: increase ROM and increase strength to improve the patients ability  to stand at least at hour with no rest periods to perform household chores. With   [x] TE   [] TA   [] neuro   [] other: Patient Education: [x] Review HEP    [] Progressed/Changed HEP based on:   [] positioning   [] body mechanics   [] transfers   [] heat/ice application    [] other:      Other Objective/Functional Measures:                                                                    Lumbar AROM:                                               Flexion                                                      65 degrees                                               Extension                                                  25 degrees                                                                                                           L                                 R  Side Bending                           22 degrees                  20 degrees                                                 Pain Level (0-10 scale) post treatment: 1/10    ASSESSMENT/Changes in Function:   Patient tolerated treatment. Patient has shown improvement with lumbar range of motion in extension and lateral flexion. Patient was initially having difficulty maintaining lumbar extension in standing position and  leaning posture. 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 Pain level is 1/10. Home exercises were reviewed and patient knows them well. Patient has met 6 goals. Patient is scheduled to follow up with MD on April 18, 2023. Patient has benefited from skilled PT services to address ROM deficits, address strength deficits, analyze and address soft tissue restrictions, analyze and cue movement patterns, and analyze and modify body mechanics/ergonomics to goals.      [x]  See Plan of Care  []  See progress note/recertification  []  See Discharge Summary         Progress towards goals / Updated goals:  Short Term Goals: To be accomplished in 6 treatments. Patient will be able to perform HEP independent to increase lumbar mobility for performing daily task. MET  Patient will be able to ride in vehicle over 25 minutes with a pain level of 5/10. MET  Patient will be able sit in chair for at least 30 minutes with a pain level of 5/10. MET     Long Term Goals: To be accomplished in 12  treatments. Patient will be able to ride in vehicle for 1 hour with a pain level of 3/10. MET  Patient will be able to sit in chair for at least 1 hour with a pain level of 3/10. MET  To be able to walk at least 10 minutes with good standing posture. MET  Patient will be able to stand at least at hour with no rest periods to perform household chores. Partially MET    PLAN  [x]  Upgrade activities as tolerated     []  Continue plan of care  []  Update interventions per flow sheet       []  Discharge due to manageable pain level and independent with HEP.   []  Other:_      Chidi Gomez, PT,  3/9/2023

## 2023-03-10 ENCOUNTER — TELEPHONE (OUTPATIENT)
Dept: NEPHROLOGY | Facility: CLINIC | Age: 58
End: 2023-03-10

## 2023-03-14 DIAGNOSIS — I50.32 CHRONIC DIASTOLIC HEART FAILURE (HCC): Primary | Chronic | ICD-10-CM

## 2023-03-15 ENCOUNTER — OFFICE VISIT (OUTPATIENT)
Dept: CARDIOLOGY CLINIC | Facility: CLINIC | Age: 58
End: 2023-03-15

## 2023-03-15 VITALS
SYSTOLIC BLOOD PRESSURE: 104 MMHG | OXYGEN SATURATION: 99 % | HEART RATE: 81 BPM | WEIGHT: 315 LBS | DIASTOLIC BLOOD PRESSURE: 60 MMHG | HEIGHT: 73 IN | BODY MASS INDEX: 41.75 KG/M2

## 2023-03-15 DIAGNOSIS — I50.32 CHRONIC HEART FAILURE WITH PRESERVED EJECTION FRACTION (HCC): Primary | ICD-10-CM

## 2023-03-15 DIAGNOSIS — G47.33 OSA (OBSTRUCTIVE SLEEP APNEA): ICD-10-CM

## 2023-03-15 DIAGNOSIS — I48.0 PAROXYSMAL ATRIAL FIBRILLATION (HCC): ICD-10-CM

## 2023-03-15 DIAGNOSIS — E78.2 MIXED HYPERLIPIDEMIA: ICD-10-CM

## 2023-03-15 RX ORDER — METOLAZONE 2.5 MG/1
TABLET ORAL
Qty: 15 TABLET | Refills: 1 | Status: SHIPPED | OUTPATIENT
Start: 2023-03-15

## 2023-03-15 NOTE — PATIENT INSTRUCTIONS
Continue bumex 4mg two times a day  Take metolazone 2 5mg once today with additional 40 meq of potassium  CardioMems transmissions  2g sodium diet  2L fluid restriction  Daily weights

## 2023-03-15 NOTE — PROGRESS NOTES
Advanced Heart Failure Outpatient Progress Note - Julissa Hernandez 56 y o  male MRN: [id number]    Encounter: [id number]      Assessment/Plan:    Patient Active Problem List    Diagnosis Date Noted   • Diabetic polyneuropathy associated with type 2 diabetes mellitus ([place] [address] ) 12/20/2022   • Stage 3a chronic kidney disease ([facility] ) 09/16/2022   • Paroxysmal atrial fibrillation ([facility] ) 03/01/2022   • Chronic diastolic heart failure ([facility] ) 11/12/2021   • Severe persistent asthma 10/11/2021   • Hyponatremia 07/31/2021   • Cardiomyopathy ([place] [address] ) 07/19/2021   • HNP (herniated nucleus pulposus), lumbar 02/23/2021   • Foraminal stenosis of lumbar region 02/23/2021   • VICKY (obstructive sleep apnea)    • Mixed hyperlipidemia 04/18/2019   • Primary osteoarthritis of both knees 06/14/2018   • Irritable bowel syndrome with diarrhea 01/30/2018   • Essential hypertension 01/30/2018   • Type 2 diabetes mellitus, without long-term current use of insulin ([place] [address] ) 01/30/2018   • Vitamin B12 deficiency 03/08/2016   • Vitamin D deficiency 03/08/2016   • Morbid obesity due to excess calories ([facility] ) 02/23/2016   • Primary osteoarthritis of right knee 10/15/2013     # Chronic HFpEF, Stage C, NYHA III   Volume up  PAD 17 on transmission today     Weight:  312 lbs 6/24/22; 231 lbs 9/21/22; 326 lbs 12/28/22  NT proBNP: 609 4/8/22      Studies- personally reviewed by [facility] 9/6/22: No obstructive CAD    Pharm Nuc Stress 9/2/22: Abnormal study due to the presence of an inferior and inferolateral infarct without significant ischemia  [address] 3/9/22:   RA 4   RV 35/4   PA 35/12/21   PCWP 10   PA sat 64%   Travis CO 5 56 L/min   Travis CI 2 2L/min/m2   PVR 1 97 SAGASTUME     TTE 03/25/2020: LVEF 40-45%  LVIDd 6 12 cm  Normal RV     TTE 07/19/2021: LVEF 50%  LVIDd 6 13 cm  Grade 1 DD  Normal RV  Trace MR and TR  Mildly dilated IVC  TTE 11/12/2021: LVEF 55%  LVIDd 6 0 cm  Grade 1 DD  Normal RV  Trace TR        Neurohormonal Blockade:   --Beta Blocker: metoprolol succinate 50 mg daily     --ARNi / ACEi / ARB: No     --Aldosterone Antagonist: No     --SGLT2 Inhibitor: No    --Diuretic: bumex 3mg BID - has been taking 4mg BID since yesterday, postassium 40 meq BID    Sudden Cardiac Death Risk Reduction:   --LVEF >35%         Electrical Resynchronization:   --Candidacy for BiV device: narrow QRS       Advanced Therapies:   Will continue to monitor  LVEF recovered      # Atrial fibrillation   [id number] = 3 (HF, HTN, DM)  Diagnosed 02/2022  Anticoagulation on Eliquis  Rate control: BB as above  Rhythm control: No       # Hypertension, controlled   Rx: amlodipine 5mg daily      # Hyperlipidemia   10/01/2021: cholesterol 157; TG 90; HDL 66; calculated LDL 73  9/5/22:  HDL 45 LDL 90  9/19/22:  HDL 64  - off lipitor for a few days  11/22/22:   HDL 55 LDL 85  Rx: atorvastatin 10 mg daily         # Diabetes mellitus, type II     Non-insulin dependent  HbA1c 6/16/22: 5 7%      # Obstructive sleep apnea, still awaiting CPAP  # Chronic respiratory failure   # Asthma, moderate persistent    # S/p gastric bypass (Samuel-en-Y)surgery    # History of tobacco abuse   # Carpal tunnel syndrome, bilateral    # CKD, Cr 1 43; 1 59 11/22/22  # s/p CardioMems implant 3/9/22     TODAY'S PLAN:  Continue bumex 4mg two times a day  Take metolazone 2 5mg once today with additional 40 meq of potassium  CardioMems transmissions, will reassess tomorrow and likely resume prior home dose of bumex 3mg BID  2g sodium diet  2L fluid restriction  Daily weights        HPI:   Julissa Hernandez is a 68-year-old man with a PMH as above who presents to the office for follow-up      Admitted to [facility] from 12/23 to 01/01/2022 after presenting with worsening SOB  Started on IV Lasix (later switched to IV Bumex) and IV steroids  Did receive 2 doses of metolazone while inpatient  Transitioned to PO torsemide on day of discharge  Lost 12 lbs and net negative 15 L this admission     Presented to [facility] ED on 02/12/2022 with worsening SOB  Diagnosed with Afib and was started on Eliquis and BB  Also received 80 mg IV Lasix, and was discharged home       02/22/2022: Patient presents for hospital follow-up  Has been feeling "good" until yesterday  Developed TELLES and noted orthopnea overnight  Also with recently worsening of cough resulting in increased use of PRN inhalers/nebulizers  Denies recent dietary indiscretion  Drinking no more than 2000 mL daily  Is completing daily weights; reports down-trending weights over past few days  Has noted slight decrease in response to PO torsemide       Admitted to [facility] from 03/01 to 03/10/2022 after presenting with worsening SOB and LE swelling  Started on IV Lasix and IV steroids  Later was switched to IV Bumex on 03/04  Transitioned to PO Bumex on 03/07  CardioMEMS implanted on 03/09  Lost 7 lbs and net negative 8 1 L this admission       03/14/2022: Patient presents for hospital follow-up  No current complaints  Weights stable at home in low 310s lbs range  No issues with CardioMEMS system at home  Still waiting for new CPAP machine       Presented to [facility] ED on 04/08 at the direction of his pulmonologist due to worsening SOB and TELLES, productive cough, and wheezing  In ED received albuterol nebulizer treatment  CXR without acute cardiopulmonary disease  Heart failure team recommended increasing Bumex to 2 mg qAM and 1 mg qPM with close outpatient follow-up       Contacted by HF RN on 04/08 to review diuretic dosing changes  Patient reported no improvement since ED visit and presented to [address] [place] [address] ED for second opinion/further evaluation  Admitted to [address] [place] [address] from 04/08 to 04/11/2022  Started on IV steroids and IV Lasix  Diagnosed with acute asthma and HF exacerbations  Lost 2 lbs this admission  Discharged on PO steroid taper      04/15/2022: Patient presents for hospital follow-up appointment   Reviewed recent hospital course(s)  No current complaints  Has continued taking Bumex 2 mg qAm and 1 mg qPM  Is completing daily weights and denies any significant weight gain since returning home  Still waiting on having new CPAP machine delivered  Planning to return to work next week    6/24/22: Here for follow up  Overall doing well  Recent treatments for asthma exacerbation  Last steroid course 3 weeks ago  He is overall doing well  No shortness of breath, PND or orthopnea  Mild lower extremity edema  Home scale not working  Recent CardioMems PADs trending up     7/28/22: Here for urgent visit due to worsening shortness of breath, weight gain and lower extremity edema  Noted symptoms since 7/25  Was getting extra doses of bumex with up to 2mg TID yesterday with no significant response  Correlates with PADs above goal  Reports adherence to diet and meds  9/21/22: Here for follow up  Admitted for chest pain and shortness of breath 9/1/22  Euvolemic on exam  Troponins negative  Pharmacological nuclear stress test showed inferior inferolateral infarct without significant ischemia  Cardiac catheterization done showed no obstructive CAD  Also treated for asthma exacerbation and given course of steroids with improvement in symptoms  He is overall feeling well today, breathing better and doing rehab     12/28/22: Here for follow up  Overall doing well  Last dose of additional bumex last month  Recent uptrend in weight related to diet  No shortness of breath, orthopnea or chest pain  No recent asthma exacerbations  Stable lower extremity edema  PAD today 16, goal 15  Interval History:  3/15/23: Here for follow up  Overall doing well until this week when he was noting more shortness of breath, wheezing worse with laying down, and leg swelling  Increased dose of bumex to 4mg BID with no significant response  Reports home scale unreliable  Review of Systems   Constitutional: Negative for chills and fever     HENT: Negative for ear pain and sore throat  Eyes: Negative for pain and visual disturbance  Respiratory: Positive for shortness of breath and wheezing  Negative for cough  Cardiovascular: Positive for leg swelling  Negative for chest pain and palpitations  Gastrointestinal: Negative for abdominal distention, abdominal pain and vomiting  Genitourinary: Negative for dysuria and hematuria  Musculoskeletal: Negative for arthralgias and back pain  Skin: Negative for color change and rash  Neurological: Negative for dizziness, seizures, syncope and light-headedness  All other systems reviewed and are negative  Past Medical History:   Diagnosis Date   • Acute gastritis without hemorrhage     last assessed 3/24/17   • Asthma    • Bilateral leg edema 2/19/2020   • CHF (congestive heart failure) ([facility])    • Daytime sleepiness 7/17/2019   • Diabetes mellitus ([facility] )    • Dyspnea on exertion 10/11/2021   • Edema of both feet 1/23/2020   • Gastric bypass status for obesity    • Hyperlipidemia    • Hypertension    • Hypokalemia 11/14/2021   • Impaired fasting glucose     last assessed 5/10/17   • Knee sprain, bilateral 6/14/2018   • Leg cramps 6/16/2022   • Obesity    • Viral gastroenteritis     last assessed 11/4/16         Allergies   Allergen Reactions   • Azithromycin Other (See Comments)     Shaky, uneasy feeling    • Bactrim [Sulfamethoxazole-Trimethoprim] Other (See Comments)     shakiness           Current Outpatient Medications:   •  Accu-Chek FastClix Lancets MISC, Test blood sugar twice daily, Disp: 200 each, Rfl: 3  •  albuterol (PROVENTIL HFA,VENTOLIN HFA) 90 mcg/act inhaler, INHALE 2 PUFFS EVERY 4 (FOUR) HOURS AS NEEDED FOR WHEEZING OR SHORTNESS OF BREATH, Disp: 18 g, Rfl: 3  •  amLODIPine (NORVASC) 5 mg tablet, Take 1 tablet (5 mg total) by mouth daily, Disp: 90 tablet, Rfl: 2  •  apixaban (Eliquis) 5 mg, Take 1 tablet (5 mg total) by mouth 2 (two) times a day, Disp: 60 tablet, Rfl: 5  •  atorvastatin (LIPITOR) 10 mg tablet, TAKE 1 TABLET BY MOUTH EVERY DAY, Disp: 90 tablet, Rfl: 2  •  benzonatate (TESSALON PERLES) 100 mg capsule, Take 1 capsule (100 mg total) by mouth 3 (three) times a day as needed (for cough), Disp: 30 capsule, Rfl: 0  •  Blood Glucose Monitoring Suppl (Accu-Chek Guide) w/Device KIT, Use 2 (two) times a day Test blood sugar twice daily, Disp: 1 kit, Rfl: 0  •  budesonide-formoterol (Symbicort) 160-4 5 mcg/act inhaler, Inhale 2 puffs 2 (two) times a day Rinse mouth after use , Disp: 30 6 g, Rfl: 2  •  bumetanide (BUMEX) 1 mg tablet, TAKE 3 TABLETS BY MOUTH TWICE A DAY, Disp: 540 tablet, Rfl: 1  •  levalbuterol (Xopenex) 1 25 mg/3 mL nebulizer solution, Take 3 mL (1 25 mg total) by nebulization 3 (three) times a day (Patient taking differently: Take 1 25 mg by nebulization as needed), Disp: 270 mL, Rfl: 3  •  metoprolol succinate (TOPROL-XL) 50 mg 24 hr tablet, Take 1 tablet (50 mg total) by mouth daily, Disp: 90 tablet, Rfl: 3  •  montelukast (SINGULAIR) 10 mg tablet, Take 1 tablet (10 mg total) by mouth daily at bedtime, Disp: 90 tablet, Rfl: 1  •  potassium chloride (K-DUR,KLOR-CON) 20 mEq tablet, Take 2 tablets (40 mEq total) by mouth 2 (two) times a day, Disp: 120 tablet, Rfl: 5  •  pregabalin (LYRICA) 50 mg capsule, Take 1 caps   at bedtime, Disp: 30 capsule, Rfl: 5  •  sitaGLIPtin (Januvia) 100 mg tablet, Take 1/2 tab  daily, Disp: 30 tablet, Rfl: 5  •  Spiriva Respimat 1 25 MCG/ACT AERS inhaler, INHALE 2 PUFFS BY MOUTH EVERY DAY, Disp: 4 g, Rfl: 5  •  Benralizumab 30 MG/ML SOAJ, Inject 30 mg under the skin every 28 days Inject 30 mg every 28 days for the first 3 injections (Patient not taking: Reported on 3/15/2023), Disp: 1 mL, Rfl: 2  •  Benralizumab 30 MG/ML SOAJ, Inject 30 mg under the skin every 56 days (Patient not taking: Reported on 3/15/2023), Disp: 1 mL, Rfl: 6  •  EPINEPHrine (EPIPEN) 0 3 mg/0 3 mL SOAJ, Inject 0 3 mL (0 3 mg total) into a muscle once for 1 dose, Disp: 0 6 mL, Rfl: 0    Social History     Socioeconomic History   • Marital status: /Civil Union     Spouse name: Not on file   • Number of children: Not on file   • Years of education: Not on file   • Highest education level: Not on file   Occupational History   • Not on file   Tobacco Use   • Smoking status: Former     Types: Cigars   • Smokeless tobacco: Never   • Tobacco comments:     cigar once a day   Vaping Use   • Vaping Use: Never used   Substance and Sexual Activity   • Alcohol use: Not Currently     Alcohol/week: 2 0 standard drinks     Types: 2 Shots of liquor per week     Comment: social   • Drug use: No   • Sexual activity: Yes     Partners: Female     Birth control/protection: None   Other Topics Concern   • Not on file   Social History Narrative   • Not on file     Social Determinants of Health     Financial Resource Strain: Not on file   Food Insecurity: No Food Insecurity   • Worried About Running Out of Food in the Last Year: Never true   • Ran Out of Food in the Last Year: Never true   Transportation Needs: No Transportation Needs   • Lack of Transportation (Medical): No   • Lack of Transportation (Non-Medical): No   Physical Activity: Not on file   Stress: Not on file   Social Connections: Not on file   Intimate Partner Violence: Not on file   Housing Stability: Low Risk    • Unable to Pay for Housing in the Last Year: No   • Number of Places Lived in the Last Year: 1   • Unstable Housing in the Last Year: No       Family History   Problem Relation Age of Onset   • Stroke Mother    • Hypertension Father    • Cancer Father    • COPD Father        Physical Exam:    Vitals:   Vitals:    03/15/23 1022   BP: 104/60   Pulse: 81   SpO2: 99%       Physical Exam  Constitutional:       General: He is not in acute distress  Appearance: Normal appearance  HENT:      Head: Normocephalic and atraumatic  Mouth/Throat:      Mouth: Mucous membranes are moist    Eyes:      General: No scleral icterus  Extraocular Movements: Extraocular movements intact  Cardiovascular:      Rate and Rhythm: Normal rate and regular rhythm  Pulses: Normal pulses  Heart sounds: S1 normal and S2 normal  No murmur heard  No friction rub  No gallop  Comments: Elevated JVP, nonpitting edema  Pulmonary:      Effort: Pulmonary effort is normal       Breath sounds: Examination of the right-upper field reveals wheezing  Examination of the left-upper field reveals wheezing  Examination of the right-middle field reveals wheezing  Examination of the left-middle field reveals wheezing  Wheezing present  Abdominal:      General: There is no distension  Palpations: Abdomen is soft  Tenderness: There is no abdominal tenderness  There is no guarding or rebound  Musculoskeletal:         General: Normal range of motion  Cervical back: Neck supple  Skin:     General: Skin is warm and dry  Capillary Refill: Capillary refill takes less than 2 seconds  Neurological:      General: No focal deficit present  Mental Status: He is alert and oriented to person, place, and time     Psychiatric:         Mood and Affect: Mood normal          Labs & Results:    Lab Results   Component Value Date    SODIUM 137 11/22/2022    K 4 1 11/22/2022     11/22/2022    CO2 30 11/22/2022    BUN 25 11/22/2022    CREATININE 1 59 (H) 11/22/2022    GLUC 158 (H) 09/07/2022    CALCIUM 9 3 11/22/2022     Lab Results   Component Value Date    WBC 9 09 12/12/2022    HGB 12 0 12/12/2022    HCT 38 1 12/12/2022    MCV 91 12/12/2022     12/12/2022     Lab Results   Component Value Date    NTBNP 2,545 (H) 09/01/2022      Lab Results   Component Value Date    CHOLESTEROL 165 11/22/2022    CHOLESTEROL 235 (H) 09/19/2022    CHOLESTEROL 163 09/05/2022     Lab Results   Component Value Date    HDL 55 11/22/2022    HDL 64 09/19/2022    HDL 45 09/05/2022     Lab Results   Component Value Date    TRIG 127 11/22/2022    TRIG 198 (H) 09/19/2022    TRIG 138 09/05/2022     Lab Results   Component Value Date    NONHDLC 110 11/22/2022    [facility] 171 09/19/2022    [facility] 118 09/05/2022       EKG personally reviewed by [place] Welch  Counseling / Coordination of Care  Time spent today 25 minutes  Greater than 50% of total time was spent with the patient and / or family counseling and / or coordination of care  We went over current diagnosis, most recent studies and any changes in treatment  Thank you for the opportunity to participate in the care of this patient      Ebony Varma MD  ADVANCED HEART FAILURE AND MECHANICAL CIRCULATORY SUPPORT  [facility]

## 2023-03-16 ENCOUNTER — TELEPHONE (OUTPATIENT)
Dept: NEPHROLOGY | Facility: CLINIC | Age: 58
End: 2023-03-16

## 2023-03-17 ENCOUNTER — TELEPHONE (OUTPATIENT)
Dept: CARDIOLOGY CLINIC | Facility: CLINIC | Age: 58
End: 2023-03-17

## 2023-03-17 NOTE — TELEPHONE ENCOUNTER
P/c'd , states his breathing seems to be getting worse  He gets sob when doing flight of steps or walking out to the kitchen  He said he has edema of his feet  He will watch it over the weekend and may go to ER if breathing gets worse  Wt today 334 lbs, but he said he is not sure the scale is correct    Was 333 lbs at visit on 3/15/23  Cardiomems # have been stable  His goal is 15 and he has been 17 yesterday and 15 today  Usually in that 15-17 range except fo 2 days 3/7/23 - 20 and 3/8/23-19    Dr Charley Coelho did increase his Bumex from 3 mg bid to 4 mg bid and he did take metolazone 2 5 Mg on Wednesday  Did not have BMP today because of the sob  He will get it tomorrow for labs  Advised to go to ER if breathing is getting worse  11/22/22  Cr-1 59(1 53 on 9/19/22), k-4 1(4 2),  Bun- 25  (22),    Taking: bumex 4 mg bid               Potassium 40 meq bid                 Metolazone 2 5 mg prn for wt gain      Dr Charley Coelho and Brandon Mcdonald off    Please advise

## 2023-03-18 ENCOUNTER — HOSPITAL ENCOUNTER (INPATIENT)
Facility: HOSPITAL | Age: 58
LOS: 3 days | Discharge: HOME | End: 2023-03-21
Attending: EMERGENCY MEDICINE | Admitting: INTERNAL MEDICINE

## 2023-03-18 ENCOUNTER — APPOINTMENT (EMERGENCY)
Dept: RADIOLOGY | Facility: HOSPITAL | Age: 58
End: 2023-03-18

## 2023-03-18 DIAGNOSIS — J45.51 SEVERE PERSISTENT ASTHMA WITH ACUTE EXACERBATION: Primary | ICD-10-CM

## 2023-03-18 DIAGNOSIS — J45.20 MILD INTERMITTENT ASTHMA WITHOUT COMPLICATION: ICD-10-CM

## 2023-03-18 LAB
ALBUMIN SERPL BCP-MCNC: 3 G/DL (ref 3.5–5)
ALP SERPL-CCNC: 101 U/L (ref 46–116)
ALT SERPL W P-5'-P-CCNC: 15 U/L (ref 12–78)
ANION GAP SERPL CALCULATED.3IONS-SCNC: 7 MMOL/L (ref 4–13)
AST SERPL W P-5'-P-CCNC: 13 U/L (ref 5–45)
BASOPHILS # BLD AUTO: 0.08 THOUSANDS/ÂΜL (ref 0–0.1)
BASOPHILS NFR BLD AUTO: 1 % (ref 0–1)
BILIRUB SERPL-MCNC: 0.38 MG/DL (ref 0.2–1)
BUN SERPL-MCNC: 19 MG/DL (ref 5–25)
CALCIUM ALBUM COR SERPL-MCNC: 9.3 MG/DL (ref 8.3–10.1)
CALCIUM SERPL-MCNC: 8.5 MG/DL (ref 8.3–10.1)
CHLORIDE SERPL-SCNC: 105 MMOL/L (ref 96–108)
CO2 SERPL-SCNC: 25 MMOL/L (ref 21–32)
CREAT SERPL-MCNC: 1.53 MG/DL (ref 0.6–1.3)
EOSINOPHIL # BLD AUTO: 0.86 THOUSAND/ÂΜL (ref 0–0.61)
EOSINOPHIL NFR BLD AUTO: 9 % (ref 0–6)
ERYTHROCYTE [DISTWIDTH] IN BLOOD BY AUTOMATED COUNT: 14.5 % (ref 11.6–15.1)
GFR SERPL CREATININE-BSD FRML MDRD: 50 ML/MIN/1.73SQ M
GLUCOSE SERPL-MCNC: 151 MG/DL (ref 65–140)
GLUCOSE SERPL-MCNC: 377 MG/DL (ref 65–140)
HCT VFR BLD AUTO: 35.3 % (ref 36.5–49.3)
HGB BLD-MCNC: 11.1 G/DL (ref 12–17)
IMM GRANULOCYTES # BLD AUTO: 0.08 THOUSAND/UL (ref 0–0.2)
IMM GRANULOCYTES NFR BLD AUTO: 1 % (ref 0–2)
LYMPHOCYTES # BLD AUTO: 1.98 THOUSANDS/ÂΜL (ref 0.6–4.47)
LYMPHOCYTES NFR BLD AUTO: 21 % (ref 14–44)
MCH RBC QN AUTO: 27 PG (ref 26.8–34.3)
MCHC RBC AUTO-ENTMCNC: 31.4 G/DL (ref 31.4–37.4)
MCV RBC AUTO: 86 FL (ref 82–98)
MONOCYTES # BLD AUTO: 0.8 THOUSAND/ÂΜL (ref 0.17–1.22)
MONOCYTES NFR BLD AUTO: 8 % (ref 4–12)
NEUTROPHILS # BLD AUTO: 5.85 THOUSANDS/ÂΜL (ref 1.85–7.62)
NEUTS SEG NFR BLD AUTO: 60 % (ref 43–75)
NRBC BLD AUTO-RTO: 0 /100 WBCS
NT-PROBNP SERPL-MCNC: 730 PG/ML
PLATELET # BLD AUTO: 307 THOUSANDS/UL (ref 149–390)
PMV BLD AUTO: 10.1 FL (ref 8.9–12.7)
POTASSIUM SERPL-SCNC: 4 MMOL/L (ref 3.5–5.3)
PROT SERPL-MCNC: 6.8 G/DL (ref 6.4–8.4)
RBC # BLD AUTO: 4.11 MILLION/UL (ref 3.88–5.62)
SODIUM SERPL-SCNC: 137 MMOL/L (ref 135–147)
WBC # BLD AUTO: 9.65 THOUSAND/UL (ref 4.31–10.16)

## 2023-03-18 RX ORDER — PREGABALIN 50 MG/1
50 CAPSULE ORAL
Status: DISCONTINUED | OUTPATIENT
Start: 2023-03-18 | End: 2023-03-21 | Stop reason: HOSPADM

## 2023-03-18 RX ORDER — FUROSEMIDE 10 MG/ML
40 INJECTION INTRAMUSCULAR; INTRAVENOUS ONCE
Status: COMPLETED | OUTPATIENT
Start: 2023-03-18 | End: 2023-03-18

## 2023-03-18 RX ORDER — AMLODIPINE BESYLATE 5 MG/1
5 TABLET ORAL DAILY
Status: DISCONTINUED | OUTPATIENT
Start: 2023-03-19 | End: 2023-03-21 | Stop reason: HOSPADM

## 2023-03-18 RX ORDER — METOLAZONE 2.5 MG/1
2.5 TABLET ORAL DAILY
Status: DISCONTINUED | OUTPATIENT
Start: 2023-03-19 | End: 2023-03-19

## 2023-03-18 RX ORDER — BENZONATATE 100 MG/1
100 CAPSULE ORAL 3 TIMES DAILY PRN
Status: DISCONTINUED | OUTPATIENT
Start: 2023-03-18 | End: 2023-03-21 | Stop reason: HOSPADM

## 2023-03-18 RX ORDER — ONDANSETRON 2 MG/ML
4 INJECTION INTRAMUSCULAR; INTRAVENOUS EVERY 6 HOURS PRN
Status: DISCONTINUED | OUTPATIENT
Start: 2023-03-18 | End: 2023-03-21 | Stop reason: HOSPADM

## 2023-03-18 RX ORDER — ATORVASTATIN CALCIUM 10 MG/1
10 TABLET, FILM COATED ORAL DAILY
Status: DISCONTINUED | OUTPATIENT
Start: 2023-03-19 | End: 2023-03-21 | Stop reason: HOSPADM

## 2023-03-18 RX ORDER — METHYLPREDNISOLONE SOD SUCC 125 MG
1 VIAL (EA) INJECTION ONCE
Status: COMPLETED | OUTPATIENT
Start: 2023-03-18 | End: 2023-03-18

## 2023-03-18 RX ORDER — MONTELUKAST SODIUM 10 MG/1
10 TABLET ORAL
Status: DISCONTINUED | OUTPATIENT
Start: 2023-03-18 | End: 2023-03-21 | Stop reason: HOSPADM

## 2023-03-18 RX ORDER — METHYLPREDNISOLONE SODIUM SUCCINATE 40 MG/ML
40 INJECTION, POWDER, LYOPHILIZED, FOR SOLUTION INTRAMUSCULAR; INTRAVENOUS EVERY 6 HOURS SCHEDULED
Status: DISCONTINUED | OUTPATIENT
Start: 2023-03-19 | End: 2023-03-19

## 2023-03-18 RX ORDER — LEVALBUTEROL 1.25 MG/.5ML
1.25 SOLUTION, CONCENTRATE RESPIRATORY (INHALATION)
Status: DISCONTINUED | OUTPATIENT
Start: 2023-03-18 | End: 2023-03-21 | Stop reason: HOSPADM

## 2023-03-18 RX ORDER — INSULIN LISPRO 100 [IU]/ML
2-12 INJECTION, SOLUTION INTRAVENOUS; SUBCUTANEOUS
Status: DISCONTINUED | OUTPATIENT
Start: 2023-03-19 | End: 2023-03-21 | Stop reason: HOSPADM

## 2023-03-18 RX ORDER — MONTELUKAST SODIUM 10 MG/1
10 TABLET ORAL
Status: DISCONTINUED | OUTPATIENT
Start: 2023-03-18 | End: 2023-03-18

## 2023-03-18 RX ORDER — BUDESONIDE AND FORMOTEROL FUMARATE DIHYDRATE 160; 4.5 UG/1; UG/1
2 AEROSOL RESPIRATORY (INHALATION) 2 TIMES DAILY
Status: DISCONTINUED | OUTPATIENT
Start: 2023-03-18 | End: 2023-03-21 | Stop reason: HOSPADM

## 2023-03-18 RX ORDER — SODIUM CHLORIDE FOR INHALATION 0.9 %
3 VIAL, NEBULIZER (ML) INHALATION ONCE
Status: COMPLETED | OUTPATIENT
Start: 2023-03-18 | End: 2023-03-18

## 2023-03-18 RX ORDER — METOPROLOL SUCCINATE 50 MG/1
50 TABLET, EXTENDED RELEASE ORAL DAILY
Status: DISCONTINUED | OUTPATIENT
Start: 2023-03-19 | End: 2023-03-21 | Stop reason: HOSPADM

## 2023-03-18 RX ORDER — ALBUTEROL SULFATE 2.5 MG/3ML
1 SOLUTION RESPIRATORY (INHALATION) ONCE
Status: COMPLETED | OUTPATIENT
Start: 2023-03-18 | End: 2023-03-18

## 2023-03-18 RX ORDER — MAGNESIUM HYDROXIDE/ALUMINUM HYDROXICE/SIMETHICONE 120; 1200; 1200 MG/30ML; MG/30ML; MG/30ML
30 SUSPENSION ORAL EVERY 6 HOURS PRN
Status: DISCONTINUED | OUTPATIENT
Start: 2023-03-18 | End: 2023-03-21 | Stop reason: HOSPADM

## 2023-03-18 RX ORDER — IPRATROPIUM BROMIDE AND ALBUTEROL SULFATE .5; 3 MG/3ML; MG/3ML
1 SOLUTION RESPIRATORY (INHALATION) ONCE
Status: COMPLETED | OUTPATIENT
Start: 2023-03-18 | End: 2023-03-18

## 2023-03-18 RX ORDER — GUAIFENESIN 600 MG/1
600 TABLET, EXTENDED RELEASE ORAL 2 TIMES DAILY
Status: DISCONTINUED | OUTPATIENT
Start: 2023-03-18 | End: 2023-03-21 | Stop reason: HOSPADM

## 2023-03-18 RX ORDER — INSULIN LISPRO 100 [IU]/ML
1-5 INJECTION, SOLUTION INTRAVENOUS; SUBCUTANEOUS
Status: DISCONTINUED | OUTPATIENT
Start: 2023-03-18 | End: 2023-03-21 | Stop reason: HOSPADM

## 2023-03-18 RX ORDER — ACETAMINOPHEN 325 MG/1
650 TABLET ORAL EVERY 6 HOURS PRN
Status: DISCONTINUED | OUTPATIENT
Start: 2023-03-18 | End: 2023-03-21 | Stop reason: HOSPADM

## 2023-03-18 RX ORDER — POTASSIUM CHLORIDE 20 MEQ/1
40 TABLET, EXTENDED RELEASE ORAL 2 TIMES DAILY
Status: DISCONTINUED | OUTPATIENT
Start: 2023-03-18 | End: 2023-03-21 | Stop reason: HOSPADM

## 2023-03-18 RX ORDER — DOCUSATE SODIUM 100 MG/1
100 CAPSULE, LIQUID FILLED ORAL 2 TIMES DAILY PRN
Status: DISCONTINUED | OUTPATIENT
Start: 2023-03-18 | End: 2023-03-21 | Stop reason: HOSPADM

## 2023-03-18 RX ORDER — SODIUM CHLORIDE FOR INHALATION 0.9 %
3 VIAL, NEBULIZER (ML) INHALATION
Status: DISCONTINUED | OUTPATIENT
Start: 2023-03-18 | End: 2023-03-21 | Stop reason: HOSPADM

## 2023-03-18 RX ADMIN — MONTELUKAST 10 MG: 10 TABLET, FILM COATED ORAL at 23:49

## 2023-03-18 RX ADMIN — BUDESONIDE AND FORMOTEROL FUMARATE DIHYDRATE 2 PUFF: 160; 4.5 AEROSOL RESPIRATORY (INHALATION) at 22:42

## 2023-03-18 RX ADMIN — ISODIUM CHLORIDE 3 ML: 0.03 SOLUTION RESPIRATORY (INHALATION) at 19:08

## 2023-03-18 RX ADMIN — GUAIFENESIN 600 MG: 600 TABLET, EXTENDED RELEASE ORAL at 21:21

## 2023-03-18 RX ADMIN — INSULIN LISPRO 4 UNITS: 100 INJECTION, SOLUTION INTRAVENOUS; SUBCUTANEOUS at 22:43

## 2023-03-18 RX ADMIN — FUROSEMIDE 40 MG: 10 INJECTION, SOLUTION INTRAMUSCULAR; INTRAVENOUS at 21:24

## 2023-03-18 RX ADMIN — APIXABAN 5 MG: 5 TABLET, FILM COATED ORAL at 22:43

## 2023-03-18 RX ADMIN — LEVALBUTEROL HYDROCHLORIDE 1.25 MG: 1.25 SOLUTION, CONCENTRATE RESPIRATORY (INHALATION) at 21:22

## 2023-03-18 RX ADMIN — ISODIUM CHLORIDE 3 ML: 0.03 SOLUTION RESPIRATORY (INHALATION) at 21:22

## 2023-03-18 RX ADMIN — IPRATROPIUM BROMIDE 1 MG: 0.5 SOLUTION RESPIRATORY (INHALATION) at 19:08

## 2023-03-18 RX ADMIN — ALBUTEROL SULFATE 10 MG: 2.5 SOLUTION RESPIRATORY (INHALATION) at 19:08

## 2023-03-18 RX ADMIN — POTASSIUM CHLORIDE 40 MEQ: 1500 TABLET, EXTENDED RELEASE ORAL at 21:21

## 2023-03-18 RX ADMIN — BUMETANIDE 3 MG: 1 TABLET ORAL at 22:43

## 2023-03-18 RX ADMIN — PREGABALIN 50 MG: 50 CAPSULE ORAL at 22:42

## 2023-03-18 NOTE — ED NOTES
Patient taken off nasal cannula as per Dr Andrea Adkins  Patient O2 saturation on room air %  Patient tolerating well       Missael Callejas RN  03/18/23 [id number]

## 2023-03-19 LAB
ALBUMIN SERPL BCP-MCNC: 3.4 G/DL (ref 3.5–5)
ALP SERPL-CCNC: 107 U/L (ref 46–116)
ALT SERPL W P-5'-P-CCNC: 18 U/L (ref 12–78)
ANION GAP SERPL CALCULATED.3IONS-SCNC: 9 MMOL/L (ref 4–13)
AST SERPL W P-5'-P-CCNC: 15 U/L (ref 5–45)
ATRIAL RATE: 81 BPM
BASOPHILS # BLD AUTO: 0.01 THOUSANDS/ÂΜL (ref 0–0.1)
BASOPHILS NFR BLD AUTO: 0 % (ref 0–1)
BILIRUB SERPL-MCNC: 0.66 MG/DL (ref 0.2–1)
BUN SERPL-MCNC: 27 MG/DL (ref 5–25)
CALCIUM ALBUM COR SERPL-MCNC: 9.6 MG/DL (ref 8.3–10.1)
CALCIUM SERPL-MCNC: 9.1 MG/DL (ref 8.3–10.1)
CHLORIDE SERPL-SCNC: 98 MMOL/L (ref 96–108)
CO2 SERPL-SCNC: 24 MMOL/L (ref 21–32)
CREAT SERPL-MCNC: 1.79 MG/DL (ref 0.6–1.3)
EOSINOPHIL # BLD AUTO: 0.01 THOUSAND/ÂΜL (ref 0–0.61)
EOSINOPHIL NFR BLD AUTO: 0 % (ref 0–6)
ERYTHROCYTE [DISTWIDTH] IN BLOOD BY AUTOMATED COUNT: 14.6 % (ref 11.6–15.1)
GFR SERPL CREATININE-BSD FRML MDRD: 41 ML/MIN/1.73SQ M
GLUCOSE SERPL-MCNC: 242 MG/DL (ref 65–140)
GLUCOSE SERPL-MCNC: 248 MG/DL (ref 65–140)
GLUCOSE SERPL-MCNC: 270 MG/DL (ref 65–140)
GLUCOSE SERPL-MCNC: 283 MG/DL (ref 65–140)
GLUCOSE SERPL-MCNC: 304 MG/DL (ref 65–140)
GLUCOSE SERPL-MCNC: 345 MG/DL (ref 65–140)
HCT VFR BLD AUTO: 36.7 % (ref 36.5–49.3)
HGB BLD-MCNC: 11.4 G/DL (ref 12–17)
IMM GRANULOCYTES # BLD AUTO: 0.06 THOUSAND/UL (ref 0–0.2)
IMM GRANULOCYTES NFR BLD AUTO: 1 % (ref 0–2)
LYMPHOCYTES # BLD AUTO: 0.3 THOUSANDS/ÂΜL (ref 0.6–4.47)
LYMPHOCYTES NFR BLD AUTO: 3 % (ref 14–44)
MAGNESIUM SERPL-MCNC: 2 MG/DL (ref 1.6–2.6)
MCH RBC QN AUTO: 26.6 PG (ref 26.8–34.3)
MCHC RBC AUTO-ENTMCNC: 31.1 G/DL (ref 31.4–37.4)
MCV RBC AUTO: 86 FL (ref 82–98)
MONOCYTES # BLD AUTO: 0.08 THOUSAND/ÂΜL (ref 0.17–1.22)
MONOCYTES NFR BLD AUTO: 1 % (ref 4–12)
NEUTROPHILS # BLD AUTO: 8.63 THOUSANDS/ÂΜL (ref 1.85–7.62)
NEUTS SEG NFR BLD AUTO: 95 % (ref 43–75)
NRBC BLD AUTO-RTO: 0 /100 WBCS
P AXIS: 44 DEGREES
PHOSPHATE SERPL-MCNC: 3 MG/DL (ref 2.7–4.5)
PLATELET # BLD AUTO: 272 THOUSANDS/UL (ref 149–390)
PMV BLD AUTO: 10.6 FL (ref 8.9–12.7)
POTASSIUM SERPL-SCNC: 4.5 MMOL/L (ref 3.5–5.3)
PR INTERVAL: 146 MS
PROT SERPL-MCNC: 7.4 G/DL (ref 6.4–8.4)
QRS AXIS: 57 DEGREES
QRSD INTERVAL: 78 MS
QT INTERVAL: 394 MS
QTC INTERVAL: 457 MS
RBC # BLD AUTO: 4.29 MILLION/UL (ref 3.88–5.62)
SODIUM SERPL-SCNC: 131 MMOL/L (ref 135–147)
T WAVE AXIS: 65 DEGREES
VENTRICULAR RATE: 81 BPM
WBC # BLD AUTO: 9.09 THOUSAND/UL (ref 4.31–10.16)

## 2023-03-19 RX ORDER — INSULIN GLARGINE 100 [IU]/ML
10 INJECTION, SOLUTION SUBCUTANEOUS EVERY MORNING
Status: DISCONTINUED | OUTPATIENT
Start: 2023-03-19 | End: 2023-03-21 | Stop reason: HOSPADM

## 2023-03-19 RX ORDER — INSULIN LISPRO 100 [IU]/ML
3 INJECTION, SOLUTION INTRAVENOUS; SUBCUTANEOUS
Status: DISCONTINUED | OUTPATIENT
Start: 2023-03-19 | End: 2023-03-20

## 2023-03-19 RX ORDER — METHYLPREDNISOLONE SODIUM SUCCINATE 40 MG/ML
40 INJECTION, POWDER, LYOPHILIZED, FOR SOLUTION INTRAMUSCULAR; INTRAVENOUS EVERY 12 HOURS SCHEDULED
Status: DISCONTINUED | OUTPATIENT
Start: 2023-03-19 | End: 2023-03-21 | Stop reason: HOSPADM

## 2023-03-19 RX ORDER — TRIAMCINOLONE ACETONIDE 1 MG/G
CREAM TOPICAL 2 TIMES DAILY
Status: DISCONTINUED | OUTPATIENT
Start: 2023-03-19 | End: 2023-03-21 | Stop reason: HOSPADM

## 2023-03-19 RX ADMIN — APIXABAN 5 MG: 5 TABLET, FILM COATED ORAL at 09:25

## 2023-03-19 RX ADMIN — INSULIN LISPRO 3 UNITS: 100 INJECTION, SOLUTION INTRAVENOUS; SUBCUTANEOUS at 13:00

## 2023-03-19 RX ADMIN — POTASSIUM CHLORIDE 40 MEQ: 1500 TABLET, EXTENDED RELEASE ORAL at 09:25

## 2023-03-19 RX ADMIN — INSULIN LISPRO 8 UNITS: 100 INJECTION, SOLUTION INTRAVENOUS; SUBCUTANEOUS at 11:29

## 2023-03-19 RX ADMIN — BUDESONIDE AND FORMOTEROL FUMARATE DIHYDRATE 2 PUFF: 160; 4.5 AEROSOL RESPIRATORY (INHALATION) at 11:23

## 2023-03-19 RX ADMIN — INSULIN LISPRO 4 UNITS: 100 INJECTION, SOLUTION INTRAVENOUS; SUBCUTANEOUS at 17:19

## 2023-03-19 RX ADMIN — BUDESONIDE AND FORMOTEROL FUMARATE DIHYDRATE 2 PUFF: 160; 4.5 AEROSOL RESPIRATORY (INHALATION) at 17:17

## 2023-03-19 RX ADMIN — APIXABAN 5 MG: 5 TABLET, FILM COATED ORAL at 17:17

## 2023-03-19 RX ADMIN — ATORVASTATIN CALCIUM 10 MG: 10 TABLET, FILM COATED ORAL at 09:26

## 2023-03-19 RX ADMIN — BUMETANIDE 3 MG: 1 TABLET ORAL at 17:17

## 2023-03-19 RX ADMIN — MONTELUKAST 10 MG: 10 TABLET, FILM COATED ORAL at 22:01

## 2023-03-19 RX ADMIN — ISODIUM CHLORIDE 3 ML: 0.03 SOLUTION RESPIRATORY (INHALATION) at 20:11

## 2023-03-19 RX ADMIN — UMECLIDINIUM 1 PUFF: 62.5 AEROSOL, POWDER ORAL at 09:28

## 2023-03-19 RX ADMIN — TRIAMCINOLONE ACETONIDE: 1 CREAM TOPICAL at 17:17

## 2023-03-19 RX ADMIN — INSULIN LISPRO 6 UNITS: 100 INJECTION, SOLUTION INTRAVENOUS; SUBCUTANEOUS at 06:05

## 2023-03-19 RX ADMIN — AMLODIPINE BESYLATE 5 MG: 5 TABLET ORAL at 09:26

## 2023-03-19 RX ADMIN — METHYLPREDNISOLONE SODIUM SUCCINATE 40 MG: 40 INJECTION, POWDER, FOR SOLUTION INTRAMUSCULAR; INTRAVENOUS at 01:10

## 2023-03-19 RX ADMIN — METHYLPREDNISOLONE SODIUM SUCCINATE 40 MG: 40 INJECTION, POWDER, FOR SOLUTION INTRAMUSCULAR; INTRAVENOUS at 05:39

## 2023-03-19 RX ADMIN — ISODIUM CHLORIDE 3 ML: 0.03 SOLUTION RESPIRATORY (INHALATION) at 07:18

## 2023-03-19 RX ADMIN — METHYLPREDNISOLONE SODIUM SUCCINATE 40 MG: 40 INJECTION, POWDER, FOR SOLUTION INTRAMUSCULAR; INTRAVENOUS at 22:01

## 2023-03-19 RX ADMIN — ISODIUM CHLORIDE 3 ML: 0.03 SOLUTION RESPIRATORY (INHALATION) at 13:34

## 2023-03-19 RX ADMIN — BUMETANIDE 3 MG: 1 TABLET ORAL at 11:24

## 2023-03-19 RX ADMIN — LEVALBUTEROL HYDROCHLORIDE 1.25 MG: 1.25 SOLUTION, CONCENTRATE RESPIRATORY (INHALATION) at 20:11

## 2023-03-19 RX ADMIN — PREGABALIN 50 MG: 50 CAPSULE ORAL at 22:01

## 2023-03-19 RX ADMIN — LEVALBUTEROL HYDROCHLORIDE 1.25 MG: 1.25 SOLUTION, CONCENTRATE RESPIRATORY (INHALATION) at 07:18

## 2023-03-19 RX ADMIN — INSULIN LISPRO 2 UNITS: 100 INJECTION, SOLUTION INTRAVENOUS; SUBCUTANEOUS at 22:01

## 2023-03-19 RX ADMIN — GUAIFENESIN 600 MG: 600 TABLET, EXTENDED RELEASE ORAL at 17:17

## 2023-03-19 RX ADMIN — METOLAZONE 2.5 MG: 2.5 TABLET ORAL at 09:27

## 2023-03-19 RX ADMIN — POTASSIUM CHLORIDE 40 MEQ: 1500 TABLET, EXTENDED RELEASE ORAL at 17:17

## 2023-03-19 RX ADMIN — GUAIFENESIN 600 MG: 600 TABLET, EXTENDED RELEASE ORAL at 09:25

## 2023-03-19 RX ADMIN — INSULIN LISPRO 3 UNITS: 100 INJECTION, SOLUTION INTRAVENOUS; SUBCUTANEOUS at 17:19

## 2023-03-19 RX ADMIN — METOPROLOL SUCCINATE 50 MG: 50 TABLET, EXTENDED RELEASE ORAL at 09:26

## 2023-03-19 RX ADMIN — LEVALBUTEROL HYDROCHLORIDE 1.25 MG: 1.25 SOLUTION, CONCENTRATE RESPIRATORY (INHALATION) at 13:34

## 2023-03-19 RX ADMIN — INSULIN GLARGINE 10 UNITS: 100 INJECTION, SOLUTION SUBCUTANEOUS at 13:00

## 2023-03-19 NOTE — ASSESSMENT & PLAN NOTE
Lab Results   Component Value Date    EGFR 50 03/18/2023    EGFR 48 11/22/2022    EGFR 50 09/19/2022    CREATININE 1 53 (H) 03/18/2023    CREATININE 1 59 (H) 11/22/2022    CREATININE 1 53 (H) 09/19/2022   Stable creatinine

## 2023-03-19 NOTE — UTILIZATION REVIEW
NOTIFICATION OF INPATIENT ADMISSION   AUTHORIZATION REQUEST   SERVICING FACILITY:   [facility]  Address: [address] [place] [address], [address] [place] [address]  Tax ID: [id number]  NPI: [id number] ATTENDING PROVIDER:  Attending Name and NPI#: Tiarra Whittington Md [[id number]]  Address: [address] [place] [address]  Phone: [phone]   ADMISSION INFORMATION:  Place of Service: Inpatient [address] [facility]  [address]  Place of Service Code: 21  Inpatient Admission Date/Time: 3/18/23  8:27 PM  Discharge Date/Time: No discharge date for patient encounter  Admitting Diagnosis Code/Description:  Chest pain [R07 9]  Severe persistent asthma with acute exacerbation [J45 51]     UTILIZATION REVIEW CONTACT:  [facility] Utilization   Network Utilization Review Department  Phone: [phone]  Fax: [phone]  Email: [email or web address]  Contact for approvals/pending authorizations, clinical reviews, and discharge  PHYSICIAN ADVISORY SERVICES:  Medical Necessity Denial & [place] Review  Phone: [phone]  Fax: [phone]  Email: [email or web address]

## 2023-03-19 NOTE — ASSESSMENT & PLAN NOTE
· Patient was seen by cardiology 3 days ago and was assessed for increased heart failure  Was given Bumex 4 mg twice daily for 1 day then to resume at the 3 mg twice daily as his usual dose with Zaroxolyn at 2 5 mg daily  BNP is not significantly increased from previous  Has more wheezing rather than crackles  Cough is productive of whitish sputum  No fever  At this point, it seems patient may have exacerbation of severe persistent asthma  · Pulm consult appreciated, solumedrol weaned to q12 dosing today with likely transition to PO prednisone tomorrow  · Continue home diuretic dosing  · Respiratory protocol and support  · Continue Symbicort 160/4 5 twice daily  · Albuterol nebulizations 3 times daily for now  · Continue Singulair Dara Soulier

## 2023-03-19 NOTE — ASSESSMENT & PLAN NOTE
States he is following a lower salt diet  Weight loss exercise and dietary modification is encouraged

## 2023-03-19 NOTE — ASSESSMENT & PLAN NOTE
Wt Readings from Last 3 Encounters:   03/15/23 (!) 151 kg (333 lb)   12/28/22 (!) 148 kg (326 lb)   12/27/22 (!) 147 kg (324 lb)     Continue amlodipine 5 mg daily  Metoprolol succinate 50 mg daily

## 2023-03-19 NOTE — ASSESSMENT & PLAN NOTE
Wt Readings from Last 3 Encounters:   03/19/23 (!) 152 kg (335 lb 1 6 oz)   03/15/23 (!) 151 kg (333 lb)   12/28/22 (!) 148 kg (326 lb)   Patient does not appear to be clinically in overt HF exac  Continue amlodipine 5 mg daily  Metoprolol succinate 50 mg daily  Patient did receive x1 dose of IV lasix 40mg  Feels improvement in his SOB  Continue  Prior home dosing of Bumex 3mg BID  Would hold further metolazone; Received dose 3/19/23  Fluid and sodium restrictions

## 2023-03-19 NOTE — ED PROVIDER NOTES
History  Chief Complaint   Patient presents with   • Shortness of Breath     Per ems - patient has asthma and CHF hx, began having SOB with exertion on Wednesday and progressively gotten worse  Patient denies CP     28-year-old male past medical history of asthma, A-fib on Eliquis, CHF presents ED complaining of shortness of breath on exertion worsening over the past week  Patient states that he feels as though his legs are getting more swollen and that he is retaining fluid in his belly and his lungs  He states that he saw his cardiologist with these complaints approximately 3 days into symptoms, he had his diuretics increased (Bumex from 3 mg twice a day to 4 mg twice a day)  He also had metolazone added to his regimen  Patient feels as though his symptoms have not improved since the changes in his diuretics  He is urinating however not very frequently and continues to feel short of breath  He has been taking his inhalers for asthma but does not feel as though the shortness of breath is related to an asthma exacerbation  Endorses paroxysmal nocturnal dyspnea requiring sleeping with many pillows propped up  He has had no cough  No recent illnesses no fevers chills  He denies any chest pain  No myalgias or arthralgias, no diarrhea or constipation no abdominal pain nausea or vomiting  Prior to Admission Medications   Prescriptions Last Dose Informant Patient Reported? Taking?    Accu-Chek FastClix Lancets MISC   No No   Sig: Test blood sugar twice daily   Benralizumab 30 MG/ML SOAJ   No No   Sig: Inject 30 mg under the skin every 28 days Inject 30 mg every 28 days for the first 3 injections   Patient not taking: Reported on 3/15/2023   Benralizumab 30 MG/ML SOAJ   No No   Sig: Inject 30 mg under the skin every 56 days   Patient not taking: Reported on 3/15/2023   Blood Glucose Monitoring Suppl (Accu-Chek Guide) w/Device KIT   No No   Sig: Use 2 (two) times a day Test blood sugar twice daily EPINEPHrine (EPIPEN) 0 3 mg/0 3 mL SOAJ   No No   Sig: Inject 0 3 mL (0 3 mg total) into a muscle once for 1 dose   Spiriva Respimat 1 25 MCG/ACT AERS inhaler   No No   Sig: INHALE 2 PUFFS BY MOUTH EVERY DAY   albuterol (PROVENTIL HFA,VENTOLIN HFA) 90 mcg/act inhaler   No No   Sig: INHALE 2 PUFFS EVERY 4 (FOUR) HOURS AS NEEDED FOR WHEEZING OR SHORTNESS OF BREATH   amLODIPine (NORVASC) 5 mg tablet   No No   Sig: Take 1 tablet (5 mg total) by mouth daily   apixaban (Eliquis) 5 mg   No No   Sig: Take 1 tablet (5 mg total) by mouth 2 (two) times a day   atorvastatin (LIPITOR) 10 mg tablet   No No   Sig: TAKE 1 TABLET BY MOUTH EVERY DAY   benzonatate (TESSALON PERLES) 100 mg capsule   No No   Sig: Take 1 capsule (100 mg total) by mouth 3 (three) times a day as needed (for cough)   budesonide-formoterol (Symbicort) 160-4 5 mcg/act inhaler   No No   Sig: Inhale 2 puffs 2 (two) times a day Rinse mouth after use  bumetanide (BUMEX) 1 mg tablet   No No   Sig: TAKE 3 TABLETS BY MOUTH TWICE A DAY   levalbuterol (Xopenex) 1 25 mg/3 mL nebulizer solution   No No   Sig: Take 3 mL (1 25 mg total) by nebulization 3 (three) times a day   Patient taking differently: Take 1 25 mg by nebulization as needed   metolazone (ZAROXOLYN) 2 5 mg tablet   No No   Si tablet as need for weight gain of 3 lbs in a day or 5 lbs in 5-7 days or as directed   metoprolol succinate (TOPROL-XL) 50 mg 24 hr tablet   No No   Sig: Take 1 tablet (50 mg total) by mouth daily   montelukast (SINGULAIR) 10 mg tablet   No No   Sig: Take 1 tablet (10 mg total) by mouth daily at bedtime   potassium chloride (K-DUR,KLOR-CON) 20 mEq tablet   No No   Sig: Take 2 tablets (40 mEq total) by mouth 2 (two) times a day   pregabalin (LYRICA) 50 mg capsule   No No   Sig: Take 1 caps   at bedtime   sitaGLIPtin (Januvia) 100 mg tablet   No No   Sig: Take 1/2 tab  daily      Facility-Administered Medications: None       Past Medical History:   Diagnosis Date   • Acute gastritis without hemorrhage     last assessed 3/24/17   • Asthma    • Bilateral leg edema 2/19/2020   • CHF (congestive heart failure) ([facility])    • Daytime sleepiness 7/17/2019   • Diabetes mellitus ([facility] [address] )    • Dyspnea on exertion 10/11/2021   • Edema of both feet 1/23/2020   • Gastric bypass status for obesity    • Hyperlipidemia    • Hypertension    • Hypokalemia 11/14/2021   • Impaired fasting glucose     last assessed 5/10/17   • Knee sprain, bilateral 6/14/2018   • Leg cramps 6/16/2022   • Obesity    • Viral gastroenteritis     last assessed 11/4/16       Past Surgical History:   Procedure Laterality Date   • CARDIAC CATHETERIZATION N/A 3/9/2022    Procedure: CARDIAC RHC W/ PRESSURE SENSOR;  Surgeon: Supriya Lockhart MD;  Location: [facility]; Service: Cardiology   • CARDIAC CATHETERIZATION N/A 9/6/2022    Procedure: Cardiac catheterization;  Surgeon: Jonathon Hernandes DO;  Location: [facility]; Service: Cardiology   • CARDIAC CATHETERIZATION N/A 9/6/2022    Procedure: Cardiac Coronary Angiogram;  Surgeon: Jonathon Hernandes DO;  Location: [facility]; Service: Cardiology   • CARPAL TUNNEL RELEASE      bilateral   • GASTRIC BYPASS  2004    with han en y   • HERNIA REPAIR      ventral inscisional   • TONSILLECTOMY         Family History   Problem Relation Age of Onset   • Stroke Mother    • Hypertension Father    • Cancer Father    • COPD Father      I have reviewed and agree with the history as documented      E-Cigarette/Vaping   • E-Cigarette Use Never User      E-Cigarette/Vaping Substances   • Nicotine No    • THC No    • CBD No    • Flavoring No    • Other No    • Unknown No      Social History     Tobacco Use   • Smoking status: Former     Types: Cigars   • Smokeless tobacco: Never   • Tobacco comments:     cigar once a day   Vaping Use   • Vaping Use: Never used   Substance Use Topics   • Alcohol use: Not Currently     Alcohol/week: 2 0 standard drinks     Types: 2 Shots of liquor per week     Comment: social   • Drug use: No        Review of Systems   Constitutional: Negative for chills and fever  HENT: Negative for ear pain and sore throat  Eyes: Negative for pain and visual disturbance  Respiratory: Positive for shortness of breath and wheezing  Negative for cough  Cardiovascular: Positive for leg swelling  Negative for chest pain and palpitations  Gastrointestinal: Negative for abdominal pain and vomiting  Genitourinary: Negative for dysuria and hematuria  Musculoskeletal: Negative for arthralgias and back pain  Skin: Negative for color change and rash  Neurological: Negative for seizures and syncope  All other systems reviewed and are negative  Physical Exam  ED Triage Vitals   Temperature Pulse Respirations Blood Pressure SpO2   03/18/23 1736 03/18/23 1718 03/18/23 1718 03/18/23 1718 03/18/23 1718   97 7 °F (36 5 °C) 77 22 137/62 100 %      Temp Source Heart Rate Source Patient Position - Orthostatic VS BP Location FiO2 (%)   03/18/23 1736 03/18/23 1718 03/18/23 1830 03/18/23 1830 --   Oral Monitor Sitting Left arm       Pain Score       03/18/23 1718       No Pain             Orthostatic Vital Signs  Vitals:    03/18/23 2200 03/18/23 2230 03/18/23 2245 03/18/23 2300   BP: 139/66 147/65 147/67    Pulse: 94 92 92 90   Patient Position - Orthostatic VS: Sitting Sitting Sitting        Physical Exam  Vitals and nursing note reviewed  Constitutional:       General: He is not in acute distress  Appearance: He is well-developed  He is obese  HENT:      Head: Normocephalic and atraumatic  Eyes:      Conjunctiva/sclera: Conjunctivae normal    Cardiovascular:      Rate and Rhythm: Normal rate and regular rhythm  Heart sounds: No murmur heard  Comments: Minimal peripheral edema present at the ankles  Nonpitting  Pulmonary:      Effort: Pulmonary effort is normal  No respiratory distress        Breath sounds: Examination of the right-upper field reveals wheezing  Examination of the left-upper field reveals wheezing  Examination of the right-middle field reveals wheezing  Examination of the left-middle field reveals wheezing  Examination of the right-lower field reveals wheezing  Examination of the left-lower field reveals wheezing  Wheezing (Expiratory wheezes present in all lung fields) present  Abdominal:      Palpations: Abdomen is soft  Tenderness: There is no abdominal tenderness  Musculoskeletal:         General: No swelling  Cervical back: Neck supple  Right lower leg: No edema  Left lower leg: No edema  Skin:     General: Skin is warm and dry  Capillary Refill: Capillary refill takes less than 2 seconds  Neurological:      Mental Status: He is alert     Psychiatric:         Mood and Affect: Mood normal          ED Medications  Medications   amLODIPine (NORVASC) tablet 5 mg (has no administration in time range)   apixaban (ELIQUIS) tablet 5 mg (5 mg Oral Given 3/18/23 2243)   atorvastatin (LIPITOR) tablet 10 mg (has no administration in time range)   benzonatate (TESSALON PERLES) capsule 100 mg (has no administration in time range)   budesonide-formoterol (SYMBICORT) 160-4 5 mcg/act inhaler 2 puff (2 puffs Inhalation Given 3/18/23 2242)   bumetanide (BUMEX) tablet 3 mg (3 mg Oral Given 3/18/23 2243)   metolazone (ZAROXOLYN) tablet 2 5 mg (has no administration in time range)   metoprolol succinate (TOPROL-XL) 24 hr tablet 50 mg (has no administration in time range)   potassium chloride (K-DUR,KLOR-CON) CR tablet 40 mEq (40 mEq Oral Given 3/18/23 2121)   pregabalin (LYRICA) capsule 50 mg (50 mg Oral Given 3/18/23 2242)   umeclidinium 62 5 mcg/actuation inhaler AEPB 1 puff (has no administration in time range)   acetaminophen (TYLENOL) tablet 650 mg (has no administration in time range)   docusate sodium (COLACE) capsule 100 mg (has no administration in time range)   ondansetron (ZOFRAN) injection 4 mg (has no administration in time range)   aluminum-magnesium hydroxide-simethicone (MYLANTA) oral suspension 30 mL (has no administration in time range)   guaiFENesin (MUCINEX) 12 hr tablet 600 mg (600 mg Oral Given 3/18/23 2121)   montelukast (SINGULAIR) tablet 10 mg (10 mg Oral Given 3/18/23 2349)   levalbuterol ([place]) inhalation solution 1 25 mg (1 25 mg Nebulization Given 3/18/23 2122)     And   sodium chloride 0 9 % inhalation solution 3 mL (3 mL Nebulization Given 3/18/23 2122)   methylPREDNISolone sodium succinate (Solu-MEDROL) injection 40 mg (has no administration in time range)   insulin lispro (HumaLOG) 100 units/mL subcutaneous injection 2-12 Units (has no administration in time range)   insulin lispro (HumaLOG) 100 units/mL subcutaneous injection 1-5 Units (4 Units Subcutaneous Given 3/18/23 2243)   albuterol (FOR EMS ONLY) (2 5 mg/3 mL) 0 083 % inhalation solution 2 5 mg (0 mg Does not apply Given to EMS 3/18/23 1736)   methylPREDNISolone sodium succinate (FOR EMS ONLY) (Solu-MEDROL) 125 MG injection 125 mg (0 mg Does not apply Given to EMS 3/18/23 1736)   ipratropium-albuterol (FOR EMS ONLY) (DUO-NEB) 0 5-2 5 mg/3 mL inhalation solution 3 mL (0 mL Does not apply Given to EMS 3/18/23 1736)   albuterol inhalation solution 10 mg (10 mg Nebulization Given 3/18/23 1908)     And   ipratropium (ATROVENT) 0 02 % inhalation solution 1 mg (1 mg Nebulization Given 3/18/23 1908)     And   sodium chloride 0 9 % inhalation solution 3 mL (3 mL Nebulization Given 3/18/23 1908)   furosemide (LASIX) injection 40 mg (40 mg Intravenous Given 3/18/23 2124)       Diagnostic Studies  Results Reviewed     Procedure Component Value Units Date/Time    Fingerstick Glucose (POCT) [[id number]]  (Abnormal) Collected: 03/18/23 2235    Lab Status: Final result Updated: 03/18/23 2236     POC Glucose 377 mg/dl     NT-BNP [facility] only [[id number]]  (Abnormal) Collected: 03/18/23 6461    Lab Status: Final result Specimen: Blood from Arm, Right Updated: 03/18/23 1848     NT-proBNP 730 pg/mL     Comprehensive metabolic panel [[id number]]  (Abnormal) Collected: 03/18/23 1738    Lab Status: Final result Specimen: Blood from Arm, Right Updated: 03/18/23 1847     Sodium 137 mmol/L      Potassium 4 0 mmol/L      Chloride 105 mmol/L      CO2 25 mmol/L      ANION GAP 7 mmol/L      BUN 19 mg/dL      Creatinine 1 53 mg/dL      Glucose 151 mg/dL      Calcium 8 5 mg/dL      Corrected Calcium 9 3 mg/dL      AST 13 U/L      ALT 15 U/L      Alkaline Phosphatase 101 U/L      Total Protein 6 8 g/dL      Albumin 3 0 g/dL      Total Bilirubin 0 38 mg/dL      eGFR 50 ml/min/1 73sq m     Narrative:      National Kid[employer] Disease Foundation guidelines for Chronic Kidney Disease (CKD):   •  Stage 1 with normal or high GFR (GFR > 90 mL/min/1 73 square meters)  •  Stage 2 Mild CKD (GFR = 60-89 mL/min/1 73 square meters)  •  Stage 3A Moderate CKD (GFR = 45-59 mL/min/1 73 square meters)  •  Stage 3B Moderate CKD (GFR = 30-44 mL/min/1 73 square meters)  •  Stage 4 Severe CKD (GFR = 15-29 mL/min/1 73 square meters)  •  Stage 5 End Stage CKD (GFR <15 mL/min/1 73 square meters)  Note: GFR calculation is accurate only with a steady state creatinine    CBC and differential [[id number]]  (Abnormal) Collected: 03/18/23 1738    Lab Status: Final result Specimen: Blood from Arm, Right Updated: 03/18/23 1747     WBC 9 65 Thousand/uL      RBC 4 11 Million/uL      Hemoglobin 11 1 g/dL      Hematocrit 35 3 %      MCV 86 fL      MCH 27 0 pg      MCHC 31 4 g/dL      RDW 14 5 %      MPV 10 1 fL      Platelets 760 Thousands/uL      nRBC 0 /100 WBCs      Neutrophils Relative 60 %      Immat GRANS % 1 %      Lymphocytes Relative 21 %      Monocytes Relative 8 %      Eosinophils Relative 9 %      Basophils Relative 1 %      Neutrophils Absolute 5 85 Thousands/µL      Immature Grans Absolute 0 08 Thousand/uL      Lymphocytes Absolute 1 98 Thousands/µL      Monocytes Absolute 0 80 Thousand/µL Eosinophils Absolute 0 86 Thousand/µL      Basophils Absolute 0 08 Thousands/µL                  XR chest 2 views    (Results Pending)         Procedures  ECG 12 Lead Documentation Only    Date/Time: 3/19/2023 12:35 AM  Performed by: Olivia Ferreira MD  Authorized by: Olivia Ferreira MD     ECG reviewed by me, the ED Provider: yes    Patient location:  ED  Previous ECG:     Previous ECG:  Compared to current    Similarity:  No change  Interpretation:     Interpretation: normal    Rate:     ECG rate:  81    ECG rate assessment: normal    Rhythm:     Rhythm: sinus rhythm    Ectopy:     Ectopy: none    QRS:     QRS axis:  Normal  Conduction:     Conduction: normal    ST segments:     ST segments:  Normal  T waves:     T waves: normal            ED Course                                       Medical Decision Making  82-year-old male past medical history of asthma, A-fib on Eliquis, CHF presents ED complaining of shortness of breath on exertion worsening over the past week  DDx: Asthma exacerbation versus CHF  Labs showing elevated BNP however not significantly elevated as in the past   Chest x-ray without overt pulmonary congestion to suggest CHF  Creatinine at baseline  Patient does have wheezing, possibly shortness of breath is all asthma related  Patient treated with heart neb however with minimal resolution of his wheezing  Continues to saturate appropriately on room air, no increased work of breathing  Discussed case with medicine, patient admitted for possible asthma exacerbation versus underlying CHF  Amount and/or Complexity of Data Reviewed  Labs: ordered  Risk  Prescription drug management              Disposition  Final diagnoses:   Severe persistent asthma with acute exacerbation     Time reflects when diagnosis was documented in both MDM as applicable and the Disposition within this note     Time User Action Codes Description Comment    3/18/2023  8:21 PM Jordan Leal Add [J45 [id number]] Severe persistent asthma with acute exacerbation       ED Disposition     None      Follow-up Information    None         Patient's Medications   Discharge Prescriptions    No medications on file     No discharge procedures on file  PDMP Review       Value Time User    PDMP Reviewed  Yes 9/23/2021 12:07 AM Balbir Lindsey DO           ED Provider  Attending physically available and evaluated Raul Jarrell  I managed the patient along with the ED Attending      Electronically Signed by         Vashti Crane MD  03/19/23 0040

## 2023-03-19 NOTE — ASSESSMENT & PLAN NOTE
· Patient was seen by cardiology 3 days ago and was assessed for increased heart failure  Was given Bumex 4 mg twice daily for 1 day then to resume at the 3 mg twice daily as his usual dose with Zaroxolyn at 2 5 mg daily  · Today's laboratories do not show that BNP is significantly increased from previous  · Has more wheezing rather than crackles  Cough is productive of whitish sputum  No fever  · At this point, it seems patient may have exacerbation of severe persistent asthma  · We will give Lasix 40 mg x 1; otherwise we will treat as primarily asthma  · Pulmonary consult for further opinion  Patient received Solu-Medrol 125 mg x 1; will continue with Solu-Medrol 40 mg every 6 hours  · Respiratory protocol and support  · Metabolic profile with CBC in the morning  · We will consider adding cardiology consult  · Continue Symbicort 160/4 5 twice daily  · Albuterol nebulizations 3 times daily for now  · Continue Josie Jean-Baptiste

## 2023-03-19 NOTE — PROGRESS NOTES
[address] [facility]  Progress Note - Lana Mcallister 1965, 56 y o  male MRN: [id number]  Unit/Bed#: [facility] 316-01 Encounter: [id number]  Primary Care Provider: Adolfo Tate MD   Date and time admitted to hospital: 3/18/2023  5:08 PM    * Severe persistent asthma with acute exacerbation  Assessment & Plan  · Patient was seen by cardiology 3 days ago and was assessed for increased heart failure  Was given Bumex 4 mg twice daily for 1 day then to resume at the 3 mg twice daily as his usual dose with Zaroxolyn at 2 5 mg daily  BNP is not significantly increased from previous  Has more wheezing rather than crackles  Cough is productive of whitish sputum  No fever  At this point, it seems patient may have exacerbation of severe persistent asthma  · Pulm consult appreciated, solumedrol weaned to q12 dosing today with likely transition to PO prednisone tomorrow  · Continue home diuretic dosing  · Respiratory protocol and support  · Continue Symbicort 160/4 5 twice daily  · Albuterol nebulizations 3 times daily for now  · Continue Singulair       Stage 3a chronic kidney disease [facility])  Assessment & Plan  Lab Results   Component Value Date    EGFR 41 03/19/2023    EGFR 50 03/18/2023    EGFR 48 11/22/2022    CREATININE 1 79 (H) 03/19/2023    CREATININE 1 53 (H) 03/18/2023    CREATININE 1 59 (H) 11/22/2022   Stable creatinine  Baseline appears to fluctuate from 1 5 - 2, likely from fluctuating volume status and diuretic changes  Chronic diastolic heart failure [facility])  Assessment & Plan  Wt Readings from Last 3 Encounters:   03/19/23 (!) 152 kg (335 lb 1 6 oz)   03/15/23 (!) 151 kg (333 lb)   12/28/22 (!) 148 kg (326 lb)   Patient does not appear to be clinically in overt HF exac  Continue amlodipine 5 mg daily  Metoprolol succinate 50 mg daily  Patient did receive x1 dose of IV lasix 40mg  Feels improvement in his SOB  Continue  Prior home dosing of Bumex 3mg BID  Would hold further metolazone; Received dose 3/19/23  Fluid and sodium restrictions  Type 2 diabetes mellitus, without long-term current use of insulin [facility])  Assessment & Plan  Lab Results   Component Value Date    HGBA1C 6 2 2022       With steroid-induced hyperglycemia  Continue ISS TID with meals and qHS  Start lantus 10u daily while inpatient  BG goal 140-180  Hypoglycemia protocol  Blood Sugar Average: Last 72 hrs:  (P) 318 75      VTE Pharmacologic Prophylaxis: VTE Score: 3 Moderate Risk (Score 3-4) - Pharmacological DVT Prophylaxis Ordered: apixaban (Eliquis)  Patient Centered Rounds: I performed bedside rounds with nursing staff today  Discussions with Specialists or Other Care Team Provider: Pulm    Education and Discussions with Family / Patient: Patient declined call to   Total Time Spent on Date of Encounter in care of patient: 35 minutes This time was spent on one or more of the following: performing physical exam; counseling and coordination of care; obtaining or reviewing history; documenting in the medical record; reviewing/ordering tests, medications or procedures; communicating with other healthcare professionals and discussing with patient's family/caregivers  Current Length of Stay: 1 day(s)  Current Patient Status: Inpatient   Certification Statement: The patient will continue to require additional inpatient hospital stay due to IV steroids  Discharge Plan: Anticipate discharge in 24-48 hrs to home  Code Status: Level 1 - Full Code    Subjective:   Patient seen this afternoon while getting nebulizer  Feels that his SOB is a little improved since being admitted, especially at rest though has not ambulated yet  He denies any CP  No productive cough  Afebrile        Objective:     Vitals:   Temp (24hrs), Av 3 °F (36 8 °C), Min:97 7 °F (36 5 °C), Max:98 7 °F (37 1 °C)    Temp:  [97 7 °F (36 5 °C)-98 7 °F (37 1 °C)] 98 7 °F (37 1 °C)  HR: [] 103  Resp:  [14-22] 20  BP: (119-155)/(59-81) 155/73  SpO2:  [93 %-100 %] 94 %  Body mass index is 44 21 kg/m²  Input and Output Summary (last 24 hours): Intake/Output Summary (Last 24 hours) at 3/19/2023 1514  Last data filed at 3/19/2023 1401  Gross per 24 hour   Intake 320 ml   Output 1420 ml   Net -1100 ml       Physical Exam:   Physical Exam  Constitutional:       Appearance: He is obese  Neck:      Comments: No JVP appreciated  Cardiovascular:      Rate and Rhythm: Normal rate and regular rhythm  Pulmonary:      Effort: No respiratory distress  Breath sounds: Wheezing (Mild expiratory wheeze bilat bases) present  Abdominal:      General: There is no distension  Palpations: Abdomen is soft  There is no mass  Tenderness: There is no abdominal tenderness  There is no guarding  Musculoskeletal:         General: Swelling (Bileral ankles, non-pitting) present  Skin:     General: Skin is warm and dry  Neurological:      Mental Status: He is alert            Additional Data:     Labs:  Results from last 7 days   Lab Units 03/19/23  0556   WBC Thousand/uL 9 09   HEMOGLOBIN g/dL 11 4*   HEMATOCRIT % 36 7   PLATELETS Thousands/uL 272   NEUTROS PCT % 95*   LYMPHS PCT % 3*   MONOS PCT % 1*   EOS PCT % 0     Results from last 7 days   Lab Units 03/19/23  0556   SODIUM mmol/L 131*   POTASSIUM mmol/L 4 5   CHLORIDE mmol/L 98   CO2 mmol/L 24   BUN mg/dL 27*   CREATININE mg/dL 1 79*   ANION GAP mmol/L 9   CALCIUM mg/dL 9 1   ALBUMIN g/dL 3 4*   TOTAL BILIRUBIN mg/dL 0 66   ALK PHOS U/L 107   ALT U/L 18   AST U/L 15   GLUCOSE RANDOM mg/dL 304*         Results from last 7 days   Lab Units 03/19/23  1358 03/19/23  1128 03/19/23  0542 03/18/23  2235   POC GLUCOSE mg/dl 270* 345* 283* 377*               Lines/Drains:  Invasive Devices     Peripheral Intravenous Line  Duration           Peripheral IV 03/19/23 Right;Ventral (anterior) Forearm <1 day                Imaging: Reviewed radiology reports from this admission including: chest xray    Recent Cultures (last 7 days):         Last 24 Hours Medication List:   Current Facility-Administered Medications   Medication Dose Route Frequency Provider Last Rate   • acetaminophen  650 mg Oral Q6H PRN Shai Curry MD     • aluminum-magnesium hydroxide-simethicone  30 mL Oral Q6H PRN Shai Curry MD     • amLODIPine  5 mg Oral Daily Shai Curry MD     • apixaban  5 mg Oral BID Shai Curry MD     • atorvastatin  10 mg Oral Daily Shai Curry MD     • benzonatate  100 mg Oral TID PRN Shai Curry MD     • budesonide-formoterol  2 puff Inhalation BID Shai Curry MD     • bumetanide  3 mg Oral BID Shai Curry MD     • docusate sodium  100 mg Oral BID PRN Shai Curry MD     • guaiFENesin  600 mg Oral BID Shai Curry MD     • insulin glargine  10 Units Subcutaneous QAM Estill Krabbe, MD     • insulin lispro  1-5 Units Subcutaneous HS Shai Curry MD     • insulin lispro  2-12 Units Subcutaneous TID AC Shai Curry MD     • insulin lispro  3 Units Subcutaneous TID With Meals Estill Krabbe, MD     • levalbuterol  1 25 mg Nebulization TID Shai Curry MD      And   • sodium chloride  3 mL Nebulization TID Shai Curry MD     • methylPREDNISolone sodium succinate  40 mg Intravenous Q12H [facility] Jyoti Frias MD     • metoprolol succinate  50 mg Oral Daily Shai Curry MD     • montelukast  10 mg Oral HS Shai Curry MD     • ondansetron  4 mg Intravenous Q6H PRN Shai Curry MD     • potassium chloride  40 mEq Oral BID Shai Curry MD     • pregabalin  50 mg Oral HS Shai Curry MD     • triamcinolone   Topical BID Estill Krabbe, MD     • umeclidinium  1 puff Inhalation Daily Shai Curry MD          Today, Patient Was Seen By: Estill Krabbe, MD    **Please Note: This note may have been constructed using a voice recognition system  **

## 2023-03-19 NOTE — ASSESSMENT & PLAN NOTE
Lab Results   Component Value Date    HGBA1C 6 2 12/20/2022       With steroid-induced hyperglycemia  Continue ISS TID with meals and qHS  Start lantus 10u daily while inpatient  BG goal 140-180  Hypoglycemia protocol      Blood Sugar Average: Last 72 hrs:  (P) 318 75

## 2023-03-19 NOTE — H&P
[address] [facility]  H&P- Albalefty Orta 1965, 56 y o  male MRN: [id number]  Unit/Bed#: ED 29 Encounter: [id number]  Primary Care Provider: Fabiano Doss MD   Date and time admitted to hospital: 3/18/2023  5:08 PM    * Severe persistent asthma with acute exacerbation  Assessment & Plan  · Patient was seen by cardiology 3 days ago and was assessed for increased heart failure  Was given Bumex 4 mg twice daily for 1 day then to resume at the 3 mg twice daily as his usual dose with Zaroxolyn at 2 5 mg daily  · Today's laboratories do not show that BNP is significantly increased from previous  · Has more wheezing rather than crackles  Cough is productive of whitish sputum  No fever  · At this point, it seems patient may have exacerbation of severe persistent asthma  · We will give Lasix 40 mg x 1; otherwise we will treat as primarily asthma  · Pulmonary consult for further opinion  Patient received Solu-Medrol 125 mg x 1; will continue with Solu-Medrol 40 mg every 6 hours  · Respiratory protocol and support  · Metabolic profile with CBC in the morning  · We will consider adding cardiology consult  · Continue Symbicort 160/4 5 twice daily  · Albuterol nebulizations 3 times daily for now  · Continue Singulair       Mixed hyperlipidemia  Assessment & Plan  Continue Lipitor 10 mg daily  Essential hypertension  Assessment & Plan  Currently on amlodipine 5 mg daily  Continue Bumex 3 mg twice daily  We will give Lasix 40 mg intravenous x1  Continue metolazone 2 5 mg daily  Continue metoprolol succinate 50 mg daily  Type 2 diabetes mellitus, without long-term current use of insulin [facility])  Assessment & Plan  Lab Results   Component Value Date    HGBA1C 6 2 12/20/2022       On Januvia; will place patient on insulin sliding scale per protocol      Blood Sugar Average: Last 72 hrs:      Stage 3a chronic kidney disease [facility])  Assessment & Plan  Lab Results Component Value Date    EGFR 50 03/18/2023    EGFR 48 11/22/2022    EGFR 50 09/19/2022    CREATININE 1 53 (H) 03/18/2023    CREATININE 1 59 (H) 11/22/2022    CREATININE 1 53 (H) 09/19/2022   Stable creatinine    Chronic diastolic heart failure [facility])  Assessment & Plan  Wt Readings from Last 3 Encounters:   03/15/23 (!) 151 kg (333 lb)   12/28/22 (!) 148 kg (326 lb)   12/27/22 (!) 147 kg (324 lb)     Continue amlodipine 5 mg daily  Metoprolol succinate 50 mg daily  VICKY (obstructive sleep apnea)  Assessment & Plan  Currently not using Pap therapy  Feels that he is doing okay without it  Morbid obesity due to excess calories [facility])  Assessment & Plan  States he is following a lower salt diet  Weight loss exercise and dietary modification is encouraged  VTE Prophylaxis: Apixaban (Eliquis)  / sequential compression device   Code Status: Level 1 - Full Code as discussed with patient  POLST: There is no POLST form on file for this patient (pre-hospital)    Anticipated Length of Stay:  Patient will be admitted on an Inpatient basis with an anticipated length of stay of greater than 2 midnights  Justification for Hospital Stay: Please see detailed plans noted above  Would need treatment for severe persistent asthma with exacerbation  May have mild CHF component  We will get pulmonary consult and treat as if primarily pulmonary problem  Meanwhile, the patient receives Lasix x1 and continue current diuretic regimen  Consider cardiology consult  Continue with anti-inflammatories and respiratory support with respiratory protocol and bronchodilators and inhalers      Chief Complaint:     Increasing shortness of breath  History of Present Illness:  Rhona Gowers is a 56 y o  male who has past medical history significant for morbid obesity, type 2 diabetes not on insulin, obstructive sleep apnea but not on CPAP, severe persistent asthma, diastolic heart failure, hyperlipidemia, hypertension, and chronic kidney disease stage IIIa  Patient comes in because of increasing shortness of breath that has been going on for the past 5 days  About 2 days ago thinking that it may be primarily cardiac problem, he was then seen by cardiology who then thought that he may have a bit of fluid overload hence was placed on Bumex 4 mg twice daily for 1 day increasing from the usual 3 mg twice daily  He continued his Zaroxolyn  He continues to have increasing shortness of breath with noted wheezing  He says that he has cough productive of whitish sputum no fever or chills  He claims to have mild swelling of bilateral ankles  No abdominal pain or nausea or vomiting  He comes to the emergency room complaining of shortness of breath and was wheezing all over  He was then given DuoNebs along with albuterol nebulization and a dose of Solu-Medrol 125 mg x 1  Although patient claims that he is not feeling any different but he cannot speak in long sentences despite abdominal breathing and overall wheezing  Likewise, though complaining of these symptoms, he is on room air with saturation at 98% to 99%  Chest x-ray shows airspace disease and in terms of pulmonary congestion, it similar to previous  BNP although slightly elevated is not as elevated as previous  (Currently 730, it was 2545 in September 2022)  Currently, patient is sitting on bed with his back elevated  He is able to speak in long sentences despite complaints of shortness of breath  He has overall wheezes but with good air movement and entry  Review of Systems:    Constitutional:  Denies fever or chills   Eyes:  Denies change in visual acuity   HENT:  Denies nasal congestion or sore throat   Respiratory: Cough productive of whitish sputum, complaining of shortness of breath, no fever  No chest pains    Cardiovascular:  Denies chest pain or edema   GI:  Denies abdominal pain, nausea, vomiting, bloody stools or diarrhea   :  Denies dysuria Musculoskeletal:  Denies back pain or joint pain   Integument:  Denies rash   Neurologic:  Denies headache, focal weakness or sensory changes   Endocrine:  Denies polyuria or polydipsia   Psychiatric:  Denies depression or anxiety     Past Medical and Surgical History:   Past Medical History:   Diagnosis Date   • Acute gastritis without hemorrhage     last assessed 3/24/17   • Asthma    • Bilateral leg edema 2/19/2020   • CHF (congestive heart failure) ([facility] [address] )    • Daytime sleepiness 7/17/2019   • Diabetes mellitus ([facility] )    • Dyspnea on exertion 10/11/2021   • Edema of both feet 1/23/2020   • Gastric bypass status for obesity    • Hyperlipidemia    • Hypertension    • Hypokalemia 11/14/2021   • Impaired fasting glucose     last assessed 5/10/17   • Knee sprain, bilateral 6/14/2018   • Leg cramps 6/16/2022   • Obesity    • Viral gastroenteritis     last assessed 11/4/16     Past Surgical History:   Procedure Laterality Date   • CARDIAC CATHETERIZATION N/A 3/9/2022    Procedure: CARDIAC RHC W/ PRESSURE SENSOR;  Surgeon: Nas Resendiz MD;  Location: [facility] CARDIAC [facility]; Service: Cardiology   • CARDIAC CATHETERIZATION N/A 9/6/2022    Procedure: Cardiac catheterization;  Surgeon: Maggi Colon DO;  Location: [facility]; Service: Cardiology   • CARDIAC CATHETERIZATION N/A 9/6/2022    Procedure: Cardiac Coronary Angiogram;  Surgeon: Maggi Colon DO;  Location: [facility] CARD[facility]; Service: Cardiology   • CARPAL TUNNEL RELEASE      bilateral   • GASTRIC BYPASS  2004    with han en y   • HERNIA REPAIR      ventral inscisional   • TONSILLECTOMY         Meds/Allergies:  (Not in a hospital admission)      Allergies:    Allergies   Allergen Reactions   • Azithromycin Other (See Comments)     Shaky, uneasy feeling    • Bactrim [Sulfamethoxazole-Trimethoprim] Other (See Comments)     shakiness     History:  Marital Status: /Civil Union   Occupation: Works as a   Patient Pre-hospital Living Situation: Lives at home  Patient Pre-hospital Level of Mobility: Ambulatory  Patient Pre-hospital Diet Restrictions: Regular consistency cardiac diabetic  Substance Use History:   Social History     Substance and Sexual Activity   Alcohol Use Not Currently   • Alcohol/week: 2 0 standard drinks   • Types: 2 Shots of liquor per week    Comment: social     Social History     Tobacco Use   Smoking Status Former   • Types: Cigars   Smokeless Tobacco Never   Tobacco Comments    cigar once a day     Social History     Substance and Sexual Activity   Drug Use No       Family History:  Family History   Problem Relation Age of Onset   • Stroke Mother    • Hypertension Father    • Cancer Father    • COPD Father        Physical Exam:     Vitals:   Blood Pressure: 127/61 (03/18/23 2000)  Pulse: 86 (03/18/23 2000)  Temperature: 97 7 °F (36 5 °C) (03/18/23 1736)  Temp Source: Oral (03/18/23 1736)  Respirations: 16 (03/18/23 2000)  SpO2: 100 % (03/18/23 2000)    Constitutional:  Well developed, well nourished, morbidly obese no acute distress, non-toxic appearance although with audible wheezes no stridor  Eyes:  PERRL, conjunctiva normal   HENT:  Atraumatic, external ears normal, nose normal, oropharynx moist, no pharyngeal exudates  Neck- normal range of motion, no tenderness, supple   Respiratory:  No respiratory distress, with bronchial coarse breath sounds, no Rales or crackles, expiratory wheezes all over  Cardiovascular:  Normal rate, normal rhythm, no murmurs, no gallops, no rubs   GI:  Soft, nondistended, globular, obese abdomen, normal bowel sounds, nontender, no organomegaly, no mass, no rebound, no guarding   :  No costovertebral angle tenderness   Musculoskeletal:  No edema, no tenderness, no deformities   Back- no tenderness  Integument:  Well hydrated, no rash   Lymphatic:  No lymphadenopathy noted   Neurologic:  Alert &awake, communicative, CN 2-12 normal, normal motor function, normal sensory function, no focal deficits noted   Psychiatric:  Speech and behavior appropriate       Lab Results: I have personally reviewed pertinent reports  Results from last 7 days   Lab Units 03/18/23  1738   WBC Thousand/uL 9 65   HEMOGLOBIN g/dL 11 1*   HEMATOCRIT % 35 3*   PLATELETS Thousands/uL 307   NEUTROS PCT % 60   LYMPHS PCT % 21   MONOS PCT % 8   EOS PCT % 9*     Results from last 7 days   Lab Units 03/18/23  1738   POTASSIUM mmol/L 4 0   CHLORIDE mmol/L 105   CO2 mmol/L 25   BUN mg/dL 19   CREATININE mg/dL 1 53*   CALCIUM mg/dL 8 5   ALK PHOS U/L 101   ALT U/L 15   AST U/L 13               Imaging: I have personally reviewed pertinent films in PACS  No infiltrate seen  No congestion  No results found  ** Please Note: Dragon 360 Dictation voice to text software was used in the creation of this document   **

## 2023-03-19 NOTE — ASSESSMENT & PLAN NOTE
Lab Results   Component Value Date    EGFR 41 03/19/2023    EGFR 50 03/18/2023    EGFR 48 11/22/2022    CREATININE 1 79 (H) 03/19/2023    CREATININE 1 53 (H) 03/18/2023    CREATININE 1 59 (H) 11/22/2022   Stable creatinine  Baseline appears to fluctuate from 1 5 - 2, likely from fluctuating volume status and diuretic changes

## 2023-03-19 NOTE — RESPIRATORY THERAPY NOTE
03/18/23 1908   Respiratory Protocol   Protocol Initiated? Yes   Protocol Selection Respiratory   Language Barrier? No   Medical & Social History Reviewed? Yes   Diagnostic Studies Reviewed? Yes   Physical Assessment Performed? Yes   Respiratory Plan Mild Distress pathway   Respiratory Assessment   Assessment Type During-treatment   General Appearance Alert; Awake   Respiratory Pattern Dyspnea at rest   Chest Assessment Chest expansion symmetrical   Bilateral Breath Sounds Diminished; Inspiratory wheezes; Expiratory wheezes   Cough Non-productive   Resp Comments Patient placed on one hour continuous aerosolization  He presents with dyspnea and wheezes  He has an asthma history mostly well controlled with symbicort/spiriva/prn albuterol HFA  He recently started with aerosolized xopenex at home due to presenting symptoms  He also has CHF history  Will follow per RP

## 2023-03-19 NOTE — ASSESSMENT & PLAN NOTE
Currently on amlodipine 5 mg daily  Continue Bumex 3 mg twice daily  We will give Lasix 40 mg intravenous x1  Continue metolazone 2 5 mg daily  Continue metoprolol succinate 50 mg daily

## 2023-03-19 NOTE — ED ATTENDING ATTESTATION
3/18/2023  IHanane MD, saw and evaluated the patient  I have discussed the patient with the resident and agree with the resident's findings, Plan of Care, and MDM as documented in the resident's note, except where noted  All available labs and Radiology studies were reviewed  I was present for key portions of any procedure(s) performed by the resident and I was immediately available to provide assistance  At this point I agree with the current assessment done in the Emergency Department  I have conducted an independent evaluation of this patient a history and physical is as follows:    55 yo morbidly obese male with a complicated past medical history including asthma, DM, HTN, CHF, hyperlipidemia, and s/p gastric bypass surgery brought to the ED by EMS for evaluation of shortness of breath and TELLES since Wednesday  The patient says his symptoms have been progressively worsening since onset and this evening he felt he "had to" call 9-1-1  No associated chest pain, nausea, vomiting, or diaphoresis  (+) Progressively increasing bilateral LE swelling over the same time period  (+) Audible wheezing at home  The patient saw his cardiologist for these symptoms and had his diuretics increased  He has been compliant with all of his home medications but his symptoms have continued to worsen  No cough  No fevers or chills  (+) Orthopnea  No recent hospitalizations  No other specific complaints  *Of note, the patient received an albuterol treatment and IV steroids en route      ROS: per resident physician note    Gen: NAD, AA&Ox3  HEENT: PERRL, EOMI  Neck: supple  CV: RRR  Lungs: (+) diffuse wheezes bilaterally, (+) decreased air movement  Abdomen: soft, NT/ND  Ext: (+) trace symmetrical bilateral LE edema  Neuro: 5/5 strength all extremities, sensation grossly intact  Skin: no rash    ED Course  The patient is mildly dyspneic but with stable vital signs  (+) Diffuse wheezes and mild LE edema on exam  Asthma exacerbation vs CHF vs ACS vs PE vs pneumonia? Will check EKG, CXR, basic labs, and BNP  Hour long neb initiated, will continue to monitor in the ED  Disposition per workup and reassessment      Critical Care Time  CriticalCare Time    Date/Time: 3/19/2023 1:02 AM  Performed by: Rad Guzman MD  Authorized by: Rad Guzman MD     Critical care provider statement:     Critical care time (minutes):  40    Critical care start time:  3/18/2023 7:00 PM    Critical care end time:  3/18/2023 7:40 PM    Critical care time was exclusive of:  Separately billable procedures and treating other patients and teaching time    Critical care was necessary to treat or prevent imminent or life-threatening deterioration of the following conditions:  Respiratory failure    Critical care was time spent personally by me on the following activities:  Obtaining history from patient or surrogate, development of treatment plan with patient or surrogate, discussions with consultants, discussions with primary provider, evaluation of patient's response to treatment, examination of patient, ordering and performing treatments and interventions, ordering and review of laboratory studies, ordering and review of radiographic studies, re-evaluation of patient's condition and review of old charts    I assumed direction of critical care for this patient from another provider in my specialty: no

## 2023-03-19 NOTE — ASSESSMENT & PLAN NOTE
Lab Results   Component Value Date    HGBA1C 6 2 12/20/2022       On Januvia; will place patient on insulin sliding scale per protocol      Blood Sugar Average: Last 72 hrs:

## 2023-03-19 NOTE — CONSULTS
Consult Note - Pulmonary   Marston Cabot 56 y o  male MRN: [id number]  Unit/Bed#: [facility] 316-01 Encounter: [id number]      Reason for consultation: Severe asthma     Requesting physician: Dr Nico Diaz & Recommendations:   Severe Persistent Asthma  Chronic HFpEF  Pulmonary HTN  Moderate VICKY/OHS, not on PAP therapy  CKD stage 3b  T2DM  Morbid Obesity    Patient feels improved this morning but has not been out of the bed  Encouraged to ambulate and OOB to chair  Will wean steroids as patient is improved  Will likely switch to prednisone tomorrow  Does not appear to be in CHF exacerbation  VIKCY not treated and given patient's high risk and multiple comorbidities for cerebrovascular and cardiovascular events  Would recommend he follow up with sleep and be set up with PAP therapy, unable to qualify him in hospital as parameters will likely not be met  Continue to maintain euvolemia  Continue home bronchodilators  Continue Singulair  History of Present Illness   HPI:  Marston Cabot is a 56 y o  male with PMH of HFpEF, severe asthma who presents with SOB  Patient states this started on Monday  He noticed he was more short of breath with exertion  He has also had a productive cough which he says is chronic  He followed up with his CHF doctor who increased his diuretics as his Mems device readings were elevated  He diuresed well but his symptoms persisted despite normal Mems device readings so he presented to ED  In the ED he was given IV steroids and IV diuretics  This morning he feels improved  He denies CP, fevers, chills, lightheadedness  Review of systems:  12 point review of systems was completed and was otherwise negative except as listed in HPI  Review of Systems   Constitutional: Negative for activity change, chills, diaphoresis, fatigue and fever  HENT: Negative for congestion, postnasal drip, rhinorrhea, sinus pressure, sinus pain and sore throat  Eyes: Negative      Respiratory: Positive for cough and shortness of breath  Negative for chest tightness  Cardiovascular: Positive for leg swelling  Negative for chest pain and palpitations  Gastrointestinal: Negative for abdominal distention, abdominal pain, constipation, diarrhea, nausea and vomiting  Endocrine: Negative  Genitourinary: Negative  Musculoskeletal: Negative for back pain, gait problem and neck pain  Skin: Negative  Allergic/Immunologic: Negative  Neurological: Negative for dizziness, syncope, weakness, light-headedness and headaches  Hematological: Negative  Psychiatric/Behavioral: Negative  All other systems reviewed and are negative  Historical Information   Past Medical History:   Diagnosis Date   • Acute gastritis without hemorrhage     last assessed 3/24/17   • Asthma    • Bilateral leg edema 2/19/2020   • CHF (congestive heart failure) ([facility])    • Daytime sleepiness 7/17/2019   • Diabetes mellitus ([facility] [address] )    • Dyspnea on exertion 10/11/2021   • Edema of both feet 1/23/2020   • Gastric bypass status for obesity    • Hyperlipidemia    • Hypertension    • Hypokalemia 11/14/2021   • Impaired fasting glucose     last assessed 5/10/17   • Knee sprain, bilateral 6/14/2018   • Leg cramps 6/16/2022   • Obesity    • Viral gastroenteritis     last assessed 11/4/16     Past Surgical History:   Procedure Laterality Date   • CARDIAC CATHETERIZATION N/A 3/9/2022    Procedure: CARDIAC RHC W/ PRESSURE SENSOR;  Surgeon: Evan Mclean MD;  Location: [facility]; Service: Cardiology   • CARDIAC CATHETERIZATION N/A 9/6/2022    Procedure: Cardiac catheterization;  Surgeon: Bridget Valdivia DO;  Location: [facility]; Service: Cardiology   • CARDIAC CATHETERIZATION N/A 9/6/2022    Procedure: Cardiac Coronary Angiogram;  Surgeon: Bridget Valdivia DO;  Location: [facility];   Service: Cardiology   • CARPAL TUNNEL RELEASE      bilateral   • GASTRIC BYPASS  2004    with han en y   • HERNIA REPAIR      ventral inscisional   • TONSILLECTOMY       Family History   Problem Relation Age of Onset   • Stroke Mother    • Hypertension Father    • Cancer Father    • COPD Father        Occupational History: desk job    Social History:   Alcohol: none   Smoking: none   Illicit drugs: none    Meds/Allergies   Current Facility-Administered Medications   Medication Dose Route Frequency   • acetaminophen (TYLENOL) tablet 650 mg  650 mg Oral Q6H PRN   • aluminum-magnesium hydroxide-simethicone (MYLANTA) oral suspension 30 mL  30 mL Oral Q6H PRN   • amLODIPine (NORVASC) tablet 5 mg  5 mg Oral Daily   • apixaban (ELIQUIS) tablet 5 mg  5 mg Oral BID   • atorvastatin (LIPITOR) tablet 10 mg  10 mg Oral Daily   • benzonatate (TESSALON PERLES) capsule 100 mg  100 mg Oral TID PRN   • budesonide-formoterol (SYMBICORT) 160-4 5 mcg/act inhaler 2 puff  2 puff Inhalation BID   • bumetanide (BUMEX) tablet 3 mg  3 mg Oral BID   • docusate sodium (COLACE) capsule 100 mg  100 mg Oral BID PRN   • guaiFENesin (MUCINEX) 12 hr tablet 600 mg  600 mg Oral BID   • insulin lispro (HumaLOG) 100 units/mL subcutaneous injection 1-5 Units  1-5 Units Subcutaneous HS   • insulin lispro (HumaLOG) 100 units/mL subcutaneous injection 2-12 Units  2-12 Units Subcutaneous TID AC   • levalbuterol (XOPENEX) inhalation solution 1 25 mg  1 25 mg Nebulization TID    And   • sodium chloride 0 9 % inhalation solution 3 mL  3 mL Nebulization TID   • methylPREDNISolone sodium succinate (Solu-MEDROL) injection 40 mg  40 mg Intravenous Q12H ANA   • metolazone (ZAROXOLYN) tablet 2 5 mg  2 5 mg Oral Daily   • metoprolol succinate (TOPROL-XL) 24 hr tablet 50 mg  50 mg Oral Daily   • montelukast (SINGULAIR) tablet 10 mg  10 mg Oral HS   • ondansetron (ZOFRAN) injection 4 mg  4 mg Intravenous Q6H PRN   • potassium chloride (K-DUR,KLOR-CON) CR tablet 40 mEq  40 mEq Oral BID   • pregabalin (LYRICA) capsule 50 mg  50 mg Oral HS   • umeclidinium 62 5 mcg/actuation inhaler AEPB 1 puff  1 puff Inhalation Daily     Medications Prior to Admission   Medication   • Accu-Chek FastClix Lancets MISC   • albuterol (PROVENTIL HFA,VENTOLIN HFA) 90 mcg/act inhaler   • amLODIPine (NORVASC) 5 mg tablet   • apixaban (Eliquis) 5 mg   • atorvastatin (LIPITOR) 10 mg tablet   • Benralizumab 30 MG/ML SOAJ   • Benralizumab 30 MG/ML SOAJ   • benzonatate (TESSALON PERLES) 100 mg capsule   • Blood Glucose Monitoring Suppl (Accu-Chek Guide) w/Device KIT   • budesonide-formoterol (Symbicort) 160-4 5 mcg/act inhaler   • bumetanide (BUMEX) 1 mg tablet   • EPINEPHrine (EPIPEN) 0 3 mg/0 3 mL SOAJ   • levalbuterol (Xopenex) 1 25 mg/3 mL nebulizer solution   • metolazone (ZAROXOLYN) 2 5 mg tablet   • metoprolol succinate (TOPROL-XL) 50 mg 24 hr tablet   • montelukast (SINGULAIR) 10 mg tablet   • potassium chloride (K-DUR,KLOR-CON) 20 mEq tablet   • pregabalin (LYRICA) 50 mg capsule   • sitaGLIPtin (Januvia) 100 mg tablet   • Spiriva Respimat 1 25 MCG/ACT AERS inhaler     Allergies   Allergen Reactions   • Azithromycin Other (See Comments)     Shaky, uneasy feeling    • Bactrim [Sulfamethoxazole-Trimethoprim] Other (See Comments)     shakiness       Vitals: Blood pressure 148/73, pulse 94, temperature 98 5 °F (36 9 °C), temperature source Oral, resp  rate 20, weight (!) 152 kg (335 lb 1 6 oz), SpO2 93 %  RA, Body mass index is 44 21 kg/m²        Intake/Output Summary (Last 24 hours) at 3/19/2023 0916  Last data filed at 3/18/2023 2350  Gross per 24 hour   Intake --   Output 420 ml   Net -420 ml       Physical Exam  General: generally well appearing, obese, Awake alert and oriented x 3, conversant without conversational dyspnea, NAD, normal affect  HEENT:  PERRL, Sclera noninjected, nonicteric OU, Nares patent, no nasal flaring, no nasal drainage, Mucous membranes, moist, no oral lesions, normal dentition  NECK: Trachea midline, no accessory muscle use, no stridor, no cervical or supraclavicular adenopathy, JVP not elevated  CARDIAC: Reg, single s1/S2, no m/r/g  PULM: diminished breath sounds, scattered expiratory wheezing   CHEST: No gross deformities, equal chest expansion on inspiration bilaterally  ABD: Normoactive bowel sounds, soft nontender, nondistended, no rebound, no rigidity, no guarding  : no murri[place]  EXT: No cyanosis, no clubbing, BLE edema, normal capillary refill  SKIN:  No rashes, no lesions  NEURO: no focal neurologic deficits, AAOx3, moving all extremities appropriately    Labs: I have personally reviewed pertinent lab results  Laboratory and Diagnostics  Results from last 7 days   Lab Units 03/19/23  0556 03/18/23  1738   WBC Thousand/uL 9 09 9 65   HEMOGLOBIN g/dL 11 4* 11 1*   HEMATOCRIT % 36 7 35 3*   PLATELETS Thousands/uL 272 307   NEUTROS PCT % 95* 60   MONOS PCT % 1* 8     Results from last 7 days   Lab Units 03/19/23  0556 03/18/23  1738   SODIUM mmol/L 131* 137   POTASSIUM mmol/L 4 5 4 0   CHLORIDE mmol/L 98 105   CO2 mmol/L 24 25   ANION GAP mmol/L 9 7   BUN mg/dL 27* 19   CREATININE mg/dL 1 79* 1 53*   CALCIUM mg/dL 9 1 8 5   GLUCOSE RANDOM mg/dL 304* 151*   ALT U/L 18 15   AST U/L 15 13   ALK PHOS U/L 107 101   ALBUMIN g/dL 3 4* 3 0*   TOTAL BILIRUBIN mg/dL 0 66 0 38     Results from last 7 days   Lab Units 03/19/23  0556   MAGNESIUM mg/dL 2 0   PHOSPHORUS mg/dL 3 0                                           Imaging and other studies: I have personally reviewed pertinent films in PACS  CXR 3/18/23: clear lungs bilaterally, implanted cardiac device  CXR 9/1/22: lungs clear bilaterally  Pulmonary function testing 7/19/21: Severe obstruction, ratio 49% with 39% FEV1, mild reduction in DLCO 65% and air trapping    EKG, Pathology, and Other Studies: I have personally reviewed pertinent reports      EKG 3/18/23: NSR    Code Status: Level 1 - Full Code    Amanuel Means MD  Pulmonary/Critical Care Fellow PGY-IV  [facility]

## 2023-03-20 LAB
GLUCOSE SERPL-MCNC: 191 MG/DL (ref 65–140)
GLUCOSE SERPL-MCNC: 219 MG/DL (ref 65–140)
GLUCOSE SERPL-MCNC: 297 MG/DL (ref 65–140)
GLUCOSE SERPL-MCNC: 330 MG/DL (ref 65–140)

## 2023-03-20 RX ORDER — INSULIN LISPRO 100 [IU]/ML
5 INJECTION, SOLUTION INTRAVENOUS; SUBCUTANEOUS
Status: DISCONTINUED | OUTPATIENT
Start: 2023-03-20 | End: 2023-03-21 | Stop reason: HOSPADM

## 2023-03-20 RX ADMIN — POTASSIUM CHLORIDE 40 MEQ: 1500 TABLET, EXTENDED RELEASE ORAL at 17:28

## 2023-03-20 RX ADMIN — GUAIFENESIN 600 MG: 600 TABLET, EXTENDED RELEASE ORAL at 08:31

## 2023-03-20 RX ADMIN — INSULIN LISPRO 6 UNITS: 100 INJECTION, SOLUTION INTRAVENOUS; SUBCUTANEOUS at 06:07

## 2023-03-20 RX ADMIN — GUAIFENESIN 600 MG: 600 TABLET, EXTENDED RELEASE ORAL at 17:28

## 2023-03-20 RX ADMIN — APIXABAN 5 MG: 5 TABLET, FILM COATED ORAL at 17:28

## 2023-03-20 RX ADMIN — INSULIN LISPRO 5 UNITS: 100 INJECTION, SOLUTION INTRAVENOUS; SUBCUTANEOUS at 11:51

## 2023-03-20 RX ADMIN — INSULIN LISPRO 1 UNITS: 100 INJECTION, SOLUTION INTRAVENOUS; SUBCUTANEOUS at 21:51

## 2023-03-20 RX ADMIN — INSULIN LISPRO 5 UNITS: 100 INJECTION, SOLUTION INTRAVENOUS; SUBCUTANEOUS at 17:27

## 2023-03-20 RX ADMIN — ISODIUM CHLORIDE 3 ML: 0.03 SOLUTION RESPIRATORY (INHALATION) at 19:07

## 2023-03-20 RX ADMIN — BUDESONIDE AND FORMOTEROL FUMARATE DIHYDRATE 2 PUFF: 160; 4.5 AEROSOL RESPIRATORY (INHALATION) at 17:27

## 2023-03-20 RX ADMIN — LEVALBUTEROL HYDROCHLORIDE 1.25 MG: 1.25 SOLUTION, CONCENTRATE RESPIRATORY (INHALATION) at 14:00

## 2023-03-20 RX ADMIN — BUDESONIDE AND FORMOTEROL FUMARATE DIHYDRATE 2 PUFF: 160; 4.5 AEROSOL RESPIRATORY (INHALATION) at 08:33

## 2023-03-20 RX ADMIN — AMLODIPINE BESYLATE 5 MG: 5 TABLET ORAL at 08:31

## 2023-03-20 RX ADMIN — INSULIN LISPRO 8 UNITS: 100 INJECTION, SOLUTION INTRAVENOUS; SUBCUTANEOUS at 11:51

## 2023-03-20 RX ADMIN — MONTELUKAST 10 MG: 10 TABLET, FILM COATED ORAL at 21:50

## 2023-03-20 RX ADMIN — TRIAMCINOLONE ACETONIDE: 1 CREAM TOPICAL at 17:29

## 2023-03-20 RX ADMIN — ISODIUM CHLORIDE 3 ML: 0.03 SOLUTION RESPIRATORY (INHALATION) at 07:35

## 2023-03-20 RX ADMIN — UMECLIDINIUM 1 PUFF: 62.5 AEROSOL, POWDER ORAL at 10:29

## 2023-03-20 RX ADMIN — TRIAMCINOLONE ACETONIDE: 1 CREAM TOPICAL at 08:34

## 2023-03-20 RX ADMIN — LEVALBUTEROL HYDROCHLORIDE 1.25 MG: 1.25 SOLUTION, CONCENTRATE RESPIRATORY (INHALATION) at 07:35

## 2023-03-20 RX ADMIN — APIXABAN 5 MG: 5 TABLET, FILM COATED ORAL at 08:31

## 2023-03-20 RX ADMIN — PREGABALIN 50 MG: 50 CAPSULE ORAL at 21:50

## 2023-03-20 RX ADMIN — LEVALBUTEROL HYDROCHLORIDE 1.25 MG: 1.25 SOLUTION, CONCENTRATE RESPIRATORY (INHALATION) at 19:07

## 2023-03-20 RX ADMIN — ISODIUM CHLORIDE 3 ML: 0.03 SOLUTION RESPIRATORY (INHALATION) at 14:00

## 2023-03-20 RX ADMIN — ATORVASTATIN CALCIUM 10 MG: 10 TABLET, FILM COATED ORAL at 08:31

## 2023-03-20 RX ADMIN — BUMETANIDE 3 MG: 1 TABLET ORAL at 17:28

## 2023-03-20 RX ADMIN — POTASSIUM CHLORIDE 40 MEQ: 1500 TABLET, EXTENDED RELEASE ORAL at 08:31

## 2023-03-20 RX ADMIN — INSULIN LISPRO 3 UNITS: 100 INJECTION, SOLUTION INTRAVENOUS; SUBCUTANEOUS at 08:32

## 2023-03-20 RX ADMIN — INSULIN LISPRO 4 UNITS: 100 INJECTION, SOLUTION INTRAVENOUS; SUBCUTANEOUS at 17:28

## 2023-03-20 RX ADMIN — METHYLPREDNISOLONE SODIUM SUCCINATE 40 MG: 40 INJECTION, POWDER, FOR SOLUTION INTRAMUSCULAR; INTRAVENOUS at 08:32

## 2023-03-20 RX ADMIN — METHYLPREDNISOLONE SODIUM SUCCINATE 40 MG: 40 INJECTION, POWDER, FOR SOLUTION INTRAMUSCULAR; INTRAVENOUS at 21:50

## 2023-03-20 RX ADMIN — METOPROLOL SUCCINATE 50 MG: 50 TABLET, EXTENDED RELEASE ORAL at 08:31

## 2023-03-20 RX ADMIN — INSULIN GLARGINE 10 UNITS: 100 INJECTION, SOLUTION SUBCUTANEOUS at 08:31

## 2023-03-20 NOTE — ASSESSMENT & PLAN NOTE
Lab Results   Component Value Date    HGBA1C 6 2 12/20/2022       With steroid-induced hyperglycemia  · Continue ISS TID with meals and qHS  Continue lantus 10u daily and Humalog 5 units 3 times daily with meals plus sliding scale  · BG goal 140-180  Hypoglycemia protocol

## 2023-03-20 NOTE — ASSESSMENT & PLAN NOTE
· States he is following a lower salt diet  · Weight loss exercise and dietary modification is encouraged

## 2023-03-20 NOTE — PROGRESS NOTES
[address] [facility]  Progress Note - Nessa Mcgraw 1965, 56 y o  male MRN: [id number]  Unit/Bed#: [facility] 316-01 Encounter: [id number]  Primary Care Provider: Adele Murray MD   Date and time admitted to hospital: 3/18/2023  5:08 PM    * Severe persistent asthma with acute exacerbation  Assessment & Plan  Patient was seen by cardiology 3 days ago and was assessed for increased heart failure  Was given Bumex 4 mg twice daily for 1 day then to resume at the 3 mg twice daily as his usual dose with Zaroxolyn at 2 5 mg daily  BNP is not significantly increased from previous  Has more wheezing rather than crackles  · Pulmonary following, continue IV SoluMedrol with plan to transition to prednisone tomorrow  · Continue home diuretic dosing  · Respiratory protocol and support  · Continue Symbicort 160/4 5 twice daily  · Albuterol nebulizations 3 times daily for now  · Continue Singulair  · Anticipate discharge home in next 24 hours    Stage 3a chronic kidney disease [facility])  Assessment & Plan  Lab Results   Component Value Date    EGFR 41 03/19/2023    EGFR 50 03/18/2023    EGFR 48 11/22/2022    CREATININE 1 79 (H) 03/19/2023    CREATININE 1 53 (H) 03/18/2023    CREATININE 1 59 (H) 11/22/2022   Stable creatinine  Baseline appears to be between 1 5 and 2   · Avoid nephrotoxins and hypotension  · BMP in a m  Type 2 diabetes mellitus, without long-term current use of insulin [facility])  Assessment & Plan  Lab Results   Component Value Date    HGBA1C 6 2 12/20/2022       With steroid-induced hyperglycemia  · Continue ISS TID with meals and qHS  Continue lantus 10u daily and Humalog 5 units 3 times daily with meals plus sliding scale  · BG goal 140-180  Hypoglycemia protocol  Hyponatremia  Assessment & Plan  · Corrects to 134  · Glucose control  · Monitor    VICKY (obstructive sleep apnea)  Assessment & Plan  · Noncompliant with CPAP      Morbid obesity due to excess calories [facility])  Assessment & Plan  · States he is following a lower salt diet  · Weight loss exercise and dietary modification is encouraged  Paroxysmal atrial fibrillation (HCC)  Assessment & Plan  · Continue Eliquis and metoprolol  Mixed hyperlipidemia  Assessment & Plan  · Continue Lipitor 10 mg daily  Essential hypertension  Assessment & Plan  · Blood pressure acceptable  Continue amlodipine, metoprolol  · Monitor      Chronic diastolic heart failure [facility])  Assessment & Plan  Wt Readings from Last 3 Encounters:   03/20/23 (!) 152 kg (335 lb 1 6 oz)   03/15/23 (!) 151 kg (333 lb)   · 12/28/22 · (!) 148 kg (326 lb)   · Volume status stable  Did receive 1 dose of IV Lasix  · Metoprolol succinate 50 mg daily  · Continue home Bumex  Holding further doses of metolazone for now  · Continue fluid/sodium restrictions  · Known to heart failure service outpatient          VTE Pharmacologic Prophylaxis: VTE Score: 3 Moderate Risk (Score 3-4) - Pharmacological DVT Prophylaxis Ordered: apixaban (Eliquis)  Patient Centered Rounds: I performed bedside rounds with nursing staff today  Discussions with Specialists or Other Care Team Provider: nursing, case management     Education and Discussions with Family / Patient: Patient declined call to   Total Time Spent on Date of Encounter in care of patient: 35 minutes This time was spent on one or more of the following: performing physical exam; counseling and coordination of care; obtaining or reviewing history; documenting in the medical record; reviewing/ordering tests, medications or procedures; communicating with other healthcare professionals and discussing with patient's family/caregivers      Current Length of Stay: 2 day(s)  Current Patient Status: Inpatient   Certification Statement: The patient will continue to require additional inpatient hospital stay due to IV steroids, monitor labs  Discharge Plan: Anticipate discharge tomorrow to home     Code Status: Level 1 - Full Code    Subjective:   Feels as though he was doing much better but woke up this morning with some worsening shortness of breath/audible wheezing  Has some lower extremity edema which is about baseline  Has been wearing compression socks  Objective:     Vitals:   Temp (24hrs), Av 9 °F (36 6 °C), Min:97 6 °F (36 4 °C), Max:98 4 °F (36 9 °C)    Temp:  [97 6 °F (36 4 °C)-98 4 °F (36 9 °C)] 97 6 °F (36 4 °C)  HR:  [70-93] 93  BP: (121-133)/(60-73) 130/70  SpO2:  [94 %-98 %] 98 %  Body mass index is 44 21 kg/m²  Input and Output Summary (last 24 hours): Intake/Output Summary (Last 24 hours) at 3/20/2023 1032  Last data filed at 3/20/2023 0930  Gross per 24 hour   Intake 340 ml   Output 3075 ml   Net -2735 ml       Physical Exam:   Physical Exam  Vitals and nursing note reviewed  Constitutional:       General: He is not in acute distress  Appearance: He is obese  Comments: On RA   Cardiovascular:      Rate and Rhythm: Normal rate  Pulmonary:      Breath sounds: Decreased breath sounds and wheezing present  No rales  Abdominal:      Tenderness: There is no abdominal tenderness  Musculoskeletal:         General: Swelling present  Skin:     General: Skin is warm  Neurological:      Mental Status: He is alert and oriented to person, place, and time  Mental status is at baseline     Psychiatric:         Mood and Affect: Mood normal           Additional Data:     Labs:  Results from last 7 days   Lab Units 23  0556   WBC Thousand/uL 9 09   HEMOGLOBIN g/dL 11 4*   HEMATOCRIT % 36 7   PLATELETS Thousands/uL 272   NEUTROS PCT % 95*   LYMPHS PCT % 3*   MONOS PCT % 1*   EOS PCT % 0     Results from last 7 days   Lab Units 23  0556   SODIUM mmol/L 131*   POTASSIUM mmol/L 4 5   CHLORIDE mmol/L 98   CO2 mmol/L 24   BUN mg/dL 27*   CREATININE mg/dL 1 79*   ANION GAP mmol/L 9   CALCIUM mg/dL 9 1   ALBUMIN g/dL 3 4*   TOTAL BILIRUBIN mg/dL 0 66   ALK PHOS U/L 107   ALT U/L 18   AST U/L 15   GLUCOSE RANDOM mg/dL 304*         Results from last 7 days   Lab Units 03/20/23  0537 03/19/23  2144 03/19/23  1654 03/19/23  1358 03/19/23  1128 03/19/23  0542 03/18/23  2235   POC GLUCOSE mg/dl 297* 242* 248* 270* 345* 283* 377*               Lines/Drains:  Invasive Devices     Peripheral Intravenous Line  Duration           Peripheral IV 03/19/23 Right;Ventral (anterior) Forearm 1 day                      Imaging: No pertinent imaging reviewed      Recent Cultures (last 7 days):         Last 24 Hours Medication List:   Current Facility-Administered Medications   Medication Dose Route Frequency Provider Last Rate   • acetaminophen  650 mg Oral Q6H PRN Jessika Zapata MD     • aluminum-magnesium hydroxide-simethicone  30 mL Oral Q6H PRN Jessika Zapata MD     • amLODIPine  5 mg Oral Daily Jessika Zapata MD     • apixaban  5 mg Oral BID Jessika Zapata MD     • atorvastatin  10 mg Oral Daily Jessika Zapata MD     • benzonatate  100 mg Oral TID PRN Jessika Zapata MD     • budesonide-formoterol  2 puff Inhalation BID Jessika Zapata MD     • bumetanide  3 mg Oral BID Jesiska Zapata MD     • docusate sodium  100 mg Oral BID PRN Jessika Zapata MD     • guaiFENesin  600 mg Oral BID Jessika Zapata MD     • insulin glargine  10 Units Subcutaneous QAMARLIN Love MD     • insulin lispro  1-5 Units Subcutaneous HS Jessika Zapata MD     • insulin lispro  2-12 Units Subcutaneous TID AC Jessika Zapata MD     • insulin lispro  5 Units Subcutaneous TID With Meals RODRIGUE Lee     • levalbuterol  1 25 mg Nebulization TID Jessika Zapata MD      And   • sodium chloride  3 mL Nebulization TID Jessika Zapata MD     • methylPREDNISolone sodium succinate  40 mg Intravenous Q12H [facility] Neftali Schultz MD     • metoprolol succinate  50 mg Oral Daily Jessika Zapata MD     • montelukast  10 mg Oral HS Sahil Morfin Jose Méndez MD     • ondansetron  4 mg Intravenous Q6H PRN Devi Peterson MD     • potassium chloride  40 mEq Oral BID Devi Peterson MD     • pregabalin  50 mg Oral HS Devi Peterson MD     • triamcinolone   Topical BID Reynaldo Trevino MD     • umeclidinium  1 puff Inhalation Daily Devi Peterson MD          Today, Patient Was Seen By: RODRIGUE Richardson    **Please Note: This note may have been constructed using a voice recognition system  **

## 2023-03-20 NOTE — ASSESSMENT & PLAN NOTE
Lab Results   Component Value Date    EGFR 41 03/19/2023    EGFR 50 03/18/2023    EGFR 48 11/22/2022    CREATININE 1 79 (H) 03/19/2023    CREATININE 1 53 (H) 03/18/2023    CREATININE 1 59 (H) 11/22/2022   Stable creatinine  Baseline appears to be between 1 5 and 2   · Avoid nephrotoxins and hypotension  · BMP in a m

## 2023-03-20 NOTE — PROGRESS NOTES
Attempted to visit patient and introduced self  Cultivated a relationship of care and support  Patient thanked for stop by his room and then expressed his  is going to come for visit him on Saturday 03/20/23 1100   Clinical Encounter Type   Visited With Patient   Routine Visit Introduction

## 2023-03-20 NOTE — CASE MANAGEMENT
Case Management Assessment & Discharge Planning Note    Patient name Shannan Arora  Location [facility] 009-43 MRN [id number]  : 1965 Date 3/20/2023       Current Admission Date: 3/18/2023  Current Admission Diagnosis:Severe persistent asthma with acute exacerbation   Patient Active Problem List    Diagnosis Date Noted   • Diabetic polyneuropathy associated with type 2 diabetes mellitus ([address] ) 2022   • Stage 3a chronic kidney disease ([address] ) 2022   • Paroxysmal atrial fibrillation ([address] ) 2022   • Chronic diastolic heart failure ([address] ) 2021   • Severe persistent asthma with acute exacerbation 10/11/2021   • Hyponatremia 2021   • Cardiomyopathy ([address] ) 2021   • HNP (herniated nucleus pulposus), lumbar 2021   • Foraminal stenosis of lumbar region 2021   • VICKY (obstructive sleep apnea)    • Mixed hyperlipidemia 2019   • Primary osteoarthritis of both knees 2018   • Irritable bowel syndrome with diarrhea 2018   • Essential hypertension 2018   • Type 2 diabetes mellitus, without long-term current use of insulin ([facility] ) 2018   • Vitamin B12 deficiency 2016   • Vitamin D deficiency 2016   • Morbid obesity due to excess calories ([facility] ) 2016   • Primary osteoarthritis of right knee 10/15/2013      LOS (days): 2  Geometric Mean LOS (GMLOS) (days):   Days to GMLOS:     OBJECTIVE:    Risk of Unplanned Readmission Score: 22 99         Current admission status: Inpatient       Preferred Pharmacy:   [facility]/pharmacy #[id number]  [address] [facility] [address]  Phone: [phone] Fax: [phone]    [facility] [address] [place] [address]  Phone: [phone] Fax: [phone]    Primary Care Provider: Fely Smith MD    Primary Insurance: [facility] COMPANY  Secondary Insurance:     ASSESSMENT:  Sylvia Rivera [employer]     Sonu Cano [address] Representative - Spouse   Primary Phone: [phone] [facility])  Mobile Phone: [phone]                         Readmission Root Cause  30 Day Readmission: No    Patient Information  Admitted from[de-identified] Home  Mental Status: Alert  During Assessment patient was accompanied by: Not accompanied during assessment  Assessment information provided by[de-identified] Patient  Primary Caregiver: Self  Support Systems: Spouse/significant other, Family members  [place] of Residence: [address] [place] [address] do you live in?: [facility] entry access options   Select all that apply : Stairs  Number of steps to enter home : 5  Type of Current Residence: 3 story home  Upon entering residence, is there a bedroom on the main floor (no further steps)?: No  A bedroom is located on the following floor levels of residence (select all that apply):: 3rd Floor  Upon entering residence, is there a bathroom on the main floor (no further steps)?: No  Indicate which floors of current residence have a bathroom (select all the apply):: 2nd Floor  Number of steps to 2nd floor from main floor: One Flight  Number of steps to 3rd floor from main floor: Two Flights  In the last 12 months, was there a time when you were not able to pay the mortgage or rent on time?: No  In the last 12 months, was there a time when you did not have a steady place to sleep or slept in a shelter (including now)?: No  Homeless/housing insecurity resource given?: N/A  Living Arrangements: Lives w/ Spouse/significant other  Is patient a ?: No    Activities of Daily Living Prior to Admission  Functional Status: Independent  Completes ADLs independently?: Yes  Ambulates independently?: Yes  Does patient use assisted devices?: No  Does patient currently own DME?: No  Does patient have a history of Outpatient Therapy (PT/OT)?: No  Does the patient have a history of Short-Term Rehab?: No  Does patient have a history of HHC?: No  Does patient currently have [facility]?: No         Patient Information Continued  Income Source: Unemployed  Does patient have prescription coverage?: Yes  Within the past 12 months, you worried that your food would run out before you got the money to buy more : Never true  Within the past 12 months, the food you bought just didn't last and you didn't have money to get more : Never true  Food insecurity resource given?: N/A  Does patient receive dialysis treatments?: No  Does patient have a history of substance abuse?: No  Does patient have a history of Mental Health Diagnosis?: No         Means of Transportation  Means of Transport to Appts[de-identified] Drives Self  In the past 12 months, has lack of transportation kept you from medical appointments or from getting medications?: No  In the past 12 months, has lack of transportation kept you from meetings, work, or from getting things needed for daily living?: No  Was application for public transport provided?: N/A        DISCHARGE DETAILS:    Discharge planning discussed with[de-identified] patient  Freedom of Choice: Yes     CM contacted family/caregiver?: No- see comments (patient alert & oriented)  Were Treatment Team discharge recommendations reviewed with patient/caregiver?: Yes  Did patient/caregiver verbalize understanding of patient care needs?: Yes  Were patient/caregiver advised of the risks associated with not following Treatment Team discharge recommendations?: Yes    Contacts  Patient Contacts: Scott Melendez, wife  Relationship to Patient[de-identified] Family  Contact Method: Phone  Phone Number: [phone]  Reason/Outcome: Discharge Planning                        Treatment Team Recommendation: Home  Discharge Destination Plan[de-identified] Home  Transport at Discharge : Family                   Patient/caregiver received discharge checklist   Content reviewed  Patient/caregiver encouraged to participate in discharge plan of care prior to discharge home    CM reviewed d/c planning process including the following: identifying help at home, patient preference for d/c planning needs, Discharge Lounge, [facility] Meds to Bed program, availability of treatment team to discuss questions or concerns patient and/or family may have regarding understanding medications and recognizing signs and symptoms once discharged  CM also encouraged patient to follow up with all recommended appointments after discharge  Patient advised of importance for patient and family to participate in managing patient’s medical well being  Additional Comments: Introduced self and role to patient at bedside  Patient independent at baseline  CM will continue to follow for d/c needs

## 2023-03-20 NOTE — UTILIZATION REVIEW
Initial Clinical Review    Admission: Date/Time/Statement:   Admission Orders (From admission, onward)     Ordered        03/18/23 2026  Inpatient Admission  Once                      Orders Placed This Encounter   Procedures   • Inpatient Admission     Standing Status:   Standing     Number of Occurrences:   1     Order Specific Question:   Level of Care     Answer:   Med Surg [16]     Order Specific Question:   Estimated length of stay     Answer:   More than 2 Midnights     Order Specific Question:   Certification     Answer:   I certify that inpatient services are medically necessary for this patient for a duration of greater than two midnights  See H&P and MD Progress Notes for additional information about the patient's course of treatment  ED Arrival Information     Expected   3/18/2023     Arrival   3/18/2023 17:08    Acuity   Urgent            Means of arrival   Ambulance    [facility]    Service   Hospitalist    Admission type   Emergency            Arrival complaint   chest pain           Chief Complaint   Patient presents with   • Shortness of Breath     Per ems - patient has asthma and CHF hx, began having SOB with exertion on Wednesday and progressively gotten worse  Patient denies CP       Initial Presentation: 56 y o  male with PMH of obesity, DM2, VICKY, Asthma, diastolic heart failure, HLD, HTN, CKD 3 presented to the ED from home via EMS w/ increasing SOB x 4 5 days  Pt saw his cardiologist 2 days ago, placed on Bumex 4 mg bid x 1 day then increased to 3 mg bid thinking it was fld overload  He continued w/ increasing SOB w/ wheezes, productive cough w/ whitish sputum, b/l ankle swelling  Given nebs and solu medrol by EMS  In the ED, on exam, alert  Awake, bronchial coarse breath sounds, expiratory wheezes all over  Satting well on RA     Chest x-ray shows airspace disease and in terms of pulmonary congestion, it similar to previous  BNP slightly elevated   Given nebs, IV Lasix 40 mg, Mucinex  Admitted as Inpatient for severe persistent asthma w/ acute exacerbation: Pulmonary consult  continue with Solu-Medrol 40 mg every 6 hours  Metabolic profile with CBC in the morning  consider adding cardiology consult  Continue Symbicort 160/4 5 twice daily  Albuterol nebulizations 3 times daily for now  Continue Singulair  Date: 03/19  Day 2:   Pulmonology Consult: Pt with worsening shortness of breath that appears to be secondary to acute exacerbation of his underlying asthma  continue with IV steroids with intention to changed to p o  within the next 1 to 2 days  IM Notes: Pt reports SOB a little improved  Not ambulated yet  On exam, alert, b/l ankle swelling, wheezing  Continue home diuretic dosing  Respiratory protocol and support  Continue Symbicort 160/4 5 twice daily  Albuterol nebulizations 3 times daily for now  Continue Singulair  Continue  Prior home dosing of Bumex 3mg BID  Fluid and sodium restrictions       ED Triage Vitals   Temperature Pulse Respirations Blood Pressure SpO2   03/18/23 1736 03/18/23 1718 03/18/23 1718 03/18/23 1718 03/18/23 1718   97 7 °F (36 5 °C) 77 22 137/62 100 %      Temp Source Heart Rate Source Patient Position - Orthostatic VS BP Location FiO2 (%)   03/18/23 1736 03/18/23 1718 03/18/23 1830 03/18/23 1830 --   Oral Monitor Sitting Left arm       Pain Score       03/18/23 1718       No Pain          Wt Readings from Last 1 Encounters:   03/20/23 (!) 152 kg (335 lb 1 6 oz)     Additional Vital Signs:   Date/Time Temp Pulse Resp BP MAP (mmHg) SpO2 Calculated FIO2 (%) - Nasal Cannula Nasal Cannula O2 Flow Rate (L/min) O2 Device Patient Position - Orthostatic VS   03/20/23 1400 -- -- -- -- -- 97 % -- -- -- --   03/20/23 0831 -- -- -- -- -- 97 % -- -- None (Room air) --   03/20/23 0823 -- 93 -- 130/70 -- -- -- -- -- --   03/20/23 0745 97 6 °F (36 4 °C) -- -- 121/60 85 98 % -- -- None (Room air) Sitting   03/20/23 0735 -- -- -- -- -- 94 % -- -- -- -- 03/19/23 2300 97 8 °F (36 6 °C) 85 -- 133/71 89 94 % -- -- None (Room air) Sitting   03/19/23 2014 -- -- -- -- -- 95 % -- -- -- --   03/19/23 1500 98 4 °F (36 9 °C) 70 -- 132/73 104 94 % -- -- None (Room air) Lying   03/19/23 1338 -- -- -- -- -- 94 % -- -- -- --   03/19/23 0900 -- -- -- -- -- -- -- -- None (Room air) --   03/19/23 0718 98 7 °F (37 1 °C) 103 20 155/73 108 93 % -- -- -- Sitting   03/19/23 0111 98 5 °F (36 9 °C) 94 20 148/73 101 94 % -- -- None (Room air) Sitting   03/18/23 2300 -- 90 20 -- -- 95 % -- -- None (Room air) --   03/18/23 2245 -- 92 22 147/67 96 93 % -- -- None (Room air) Sitting   03/18/23 2230 -- 92 18 147/65 93 93 % -- -- None (Room air) Sitting   03/18/23 2200 -- 94 19 139/66 95 93 % -- -- None (Room air) Sitting   03/18/23 2130 -- 98 17 143/65 93 98 % -- -- None (Room air) Sitting   03/18/23 2100 -- 98 20 138/63 91 93 % -- -- None (Room air) Sitting   03/18/23 2000 -- 86 16 127/61 88 100 % 40 5 L/min None (Room air) Sitting   03/18/23 1930 -- 76 14 119/81 94 100 % -- -- Nasal cannula Sitting   03/18/23 1830 -- 78 22 127/59 -- 95 % -- -- -- Sitting   03/18/23 1736 97 7 °F (36 5 °C) -- -- -- -- -- -- -- -- --       Pertinent Labs/Diagnostic Test Results:   XR chest 2 views   Final Result by Joseph Huerta MD (03/19 2069)      No acute cardiopulmonary disease                 Workstation performed: [id number]           03/18 EKG result: Normal sinus rhythm  Low voltage QRS    03/19 EKG result: NSR      Results from last 7 days   Lab Units 03/19/23  0556 03/18/23  1738   WBC Thousand/uL 9 09 9 65   HEMOGLOBIN g/dL 11 4* 11 1*   HEMATOCRIT % 36 7 35 3*   PLATELETS Thousands/uL 272 307   NEUTROS ABS Thousands/µL 8 63* 5 85         Results from last 7 days   Lab Units 03/19/23  0556 03/18/23  1738   SODIUM mmol/L 131* 137   POTASSIUM mmol/L 4 5 4 0   CHLORIDE mmol/L 98 105   CO2 mmol/L 24 25   ANION GAP mmol/L 9 7   BUN mg/dL 27* 19   CREATININE mg/dL 1 79* 1 53*   EGFR ml/min/1 73sq m 41 50   CALCIUM mg/dL 9 1 8 5   MAGNESIUM mg/dL 2 0  --    PHOSPHORUS mg/dL 3 0  --      Results from last 7 days   Lab Units 03/19/23  0556 03/18/23  1738   AST U/L 15 13   ALT U/L 18 15   ALK PHOS U/L 107 101   TOTAL PROTEIN g/dL 7 4 6 8   ALBUMIN g/dL 3 4* 3 0*   TOTAL BILIRUBIN mg/dL 0 66 0 38     Results from last 7 days   Lab Units 03/20/23  1137 03/20/23  0537 03/19/23  2144 03/19/23  1654 03/19/23  1358 03/19/23  1128 03/19/23  0542 03/18/23  2235   POC GLUCOSE mg/dl 330* 297* 242* 248* 270* 345* 283* 377*     Results from last 7 days   Lab Units 03/19/23  0556 03/18/23  1738   GLUCOSE RANDOM mg/dL 304* 151*       Results from last 7 days   Lab Units 03/18/23  1738   NT-PRO BNP pg/mL 730*         ED Treatment:   Medication Administration from 03/18/2023 1708 to 03/19/2023 0056       Date/Time Order Dose Route Action     03/18/2023 1736 EDT albuterol (FOR EMS ONLY) (2 5 mg/3 mL) 0 083 % inhalation solution 2 5 mg 0 mg Does not apply Given to EMS     03/18/2023 1736 EDT methylPREDNISolone sodium succinate (FOR EMS ONLY) (Solu-MEDROL) 125 MG injection 125 mg 0 mg Does not apply Given to EMS     03/18/2023 1736 EDT ipratropium-albuterol (FOR EMS ONLY) (DUO-NEB) 0 5-2 5 mg/3 mL inhalation solution 3 mL 0 mL Does not apply Given to EMS     03/18/2023 1908 EDT albuterol inhalation solution 10 mg 10 mg Nebulization Given     03/18/2023 1908 EDT ipratropium (ATROVENT) 0 02 % inhalation solution 1 mg 1 mg Nebulization Given     03/18/2023 1908 EDT sodium chloride 0 9 % inhalation solution 3 mL 3 mL Nebulization Given     03/18/2023 2243 EDT apixaban (ELIQUIS) tablet 5 mg 5 mg Oral Given     03/18/2023 2242 EDT budesonide-formoterol (SYMBICORT) 160-4 5 mcg/act inhaler 2 puff 2 puff Inhalation Given     03/18/2023 2243 EDT bumetanide (BUMEX) tablet 3 mg 3 mg Oral Given     03/18/2023 2121 EDT potassium chloride (K-DUR,KLOR-CON) CR tablet 40 mEq 40 mEq Oral Given     03/18/2023 2242 EDT pregabalin (LYRICA) capsule 50 mg 50 mg Oral Given     03/18/2023 2124 EDT furosemide (LASIX) injection 40 mg 40 mg Intravenous Given     03/18/2023 2121 EDT guaiFENesin (MUCINEX) 12 hr tablet 600 mg 600 mg Oral Given     03/18/2023 2349 EDT montelukast (SINGULAIR) tablet 10 mg 10 mg Oral Given     03/18/2023 2122 EDT levalbuterol (XOPENEX) inhalation solution 1 25 mg 1 25 mg Nebulization Given     03/18/2023 2122 EDT sodium chloride 0 9 % inhalation solution 3 mL 3 mL Nebulization Given     03/18/2023 2243 EDT insulin lispro (HumaLOG) 100 units/mL subcutaneous injection 1-5 Units 4 Units Subcutaneous Given        Past Medical History:   Diagnosis Date   • Acute gastritis without hemorrhage     last assessed 3/24/17   • Asthma    • Bilateral leg edema 2/19/2020   • CHF (congestive heart failure) ([facility] )    • Daytime sleepiness 7/17/2019   • Diabetes mellitus ([facility] )    • Dyspnea on exertion 10/11/2021   • Edema of both feet 1/23/2020   • Gastric bypass status for obesity    • Hyperlipidemia    • Hypertension    • Hypokalemia 11/14/2021   • Impaired fasting glucose     last assessed 5/10/17   • Knee sprain, bilateral 6/14/2018   • Leg cramps 6/16/2022   • Obesity    • Viral gastroenteritis     last assessed 11/4/16     Present on Admission:  • Type 2 diabetes mellitus, without long-term current use of insulin ([facility])  • Severe persistent asthma with acute exacerbation  • Mixed hyperlipidemia  • Chronic diastolic heart failure (HCC)  • VICKY (obstructive sleep apnea)  • Morbid obesity due to excess calories ([facility])  • Essential hypertension  • Stage 3a chronic kidney disease (H[facility])  • Hyponatremia  • Paroxysmal atrial fibrillation (HCC)      Admitting Diagnosis: Chest pain [R07 9]  Severe persistent asthma with acute exacerbation [J45 51]  Age/Sex: 56 y o  male  Admission Orders:  SCD  Apply knee high compression stockings  Daily wts  I/O    Scheduled Medications:  amLODIPine, 5 mg, Oral, Daily  apixaban, 5 mg, Oral, BID  atorvastatin, 10 mg, Oral, Daily  budesonide-formoterol, 2 puff, Inhalation, BID  bumetanide, 3 mg, Oral, BID  guaiFENesin, 600 mg, Oral, BID  insulin glargine, 10 Units, Subcutaneous, QAM  insulin lispro, 1-5 Units, Subcutaneous, HS  insulin lispro, 2-12 Units, Subcutaneous, TID AC  insulin lispro, 5 Units, Subcutaneous, TID With Meals  levalbuterol, 1 25 mg, Nebulization, TID   And  sodium chloride, 3 mL, Nebulization, TID  methylPREDNISolone sodium succinate, 40 mg, Intravenous, Q12H ANA  metoprolol succinate, 50 mg, Oral, Daily  montelukast, 10 mg, Oral, HS  potassium chloride, 40 mEq, Oral, BID  pregabalin, 50 mg, Oral, HS  triamcinolone, , Topical, BID  umeclidinium, 1 puff, Inhalation, Daily      Continuous IV Infusions: none     PRN Meds:  acetaminophen, 650 mg, Oral, Q6H PRN  aluminum-magnesium hydroxide-simethicone, 30 mL, Oral, Q6H PRN  benzonatate, 100 mg, Oral, TID PRN  docusate sodium, 100 mg, Oral, BID PRN  ondansetron, 4 mg, Intravenous, Q6H PRN        IP CONSULT TO PULMONOLOGY  IP CONSULT TO CASE MANAGEMENT    Network Utilization Review Department  ATTENTION: Please call with any questions or concerns to [phone] and carefully listen to the prompts so that you are directed to the right person  All voicemails are confidential   Brianna Corrigan all requests for admission clinical reviews, approved or denied determinations and any other requests to dedicated fax number below belonging to the campus where the patient is receiving treatment   List of dedicated fax numbers for the Facilities:  [address] DENIALS (Administrative/Medical Necessity) [phone]   [address] (Maternity/NICU/Pediatrics) [phone]   [address] Shania Reynolds [phone]   [facility] [phone] [address] [place] [address] [address] [place] [address] [facility] [facility] [address] [facility] 310 av [facility] [place] 134 915 [facility] [phone]

## 2023-03-20 NOTE — PLAN OF CARE
Problem: Potential for Falls  Goal: Patient will remain free of falls  Description: INTERVENTIONS:  - Educate patient/family on patient safety including physical limitations  - Instruct patient to call for assistance with activity   - Consult OT/PT to assist with strengthening/mobility   - Keep Call bell within reach  - Keep bed low and locked with side rails adjusted as appropriate  - Keep care items and personal belongings within reach  - Initiate and maintain comfort rounds  - Make Fall Risk Sign visible to staff  - Offer Toileting every  Hours, in advance of need  - Initiate/Maintain alarm  - Obtain necessary fall risk management equipment:   - Apply yellow socks and bracelet for high fall risk patients  - Consider moving patient to room near nurses station  Outcome: Not Progressing     Problem: PAIN - ADULT  Goal: Verbalizes/displays adequate comfort level or baseline comfort level  Description: Interventions:  - Encourage patient to monitor pain and request assistance  - Assess pain using appropriate pain scale  - Administer analgesics based on type and severity of pain and evaluate response  - Implement non-pharmacological measures as appropriate and evaluate response  - Consider cultural and social influences on pain and pain management  - Notify physician/advanced practitioner if interventions unsuccessful or patient reports new pain  Outcome: Not Progressing     Problem: INFECTION - ADULT  Goal: Absence or prevention of progression during hospitalization  Description: INTERVENTIONS:  - Assess and monitor for signs and symptoms of infection  - Monitor lab/diagnostic results  - Monitor all insertion sites, i e  indwelling lines, tubes, and drains  - Monitor endotracheal if appropriate and nasal secretions for changes in amount and color  - Spencer appropriate cooling/warming therapies per order  - Administer medications as ordered  - Instruct and encourage patient and family to use good hand hygiene technique  - Identify and instruct in appropriate isolation precautions for identified infection/condition  Outcome: Not Progressing  Goal: Absence of fever/infection during neutropenic period  Description: INTERVENTIONS:  - Monitor WBC    Outcome: Not Progressing     Problem: SAFETY ADULT  Goal: Patient will remain free of falls  Description: INTERVENTIONS:  - Educate patient/family on patient safety including physical limitations  - Instruct patient to call for assistance with activity   - Consult OT/PT to assist with strengthening/mobility   - Keep Call bell within reach  - Keep bed low and locked with side rails adjusted as appropriate  - Keep care items and personal belongings within reach  - Initiate and maintain comfort rounds  - Make Fall Risk Sign visible to staff  - Offer Toileting every  Hours, in advance of need  - Initiate/Maintain alarm  - Obtain necessary fall risk management equipment:   - Apply yellow socks and bracelet for high fall risk patients  - Consider moving patient to room near nurses station  Outcome: Not Progressing  Goal: Maintain or return to baseline ADL function  Description: INTERVENTIONS:  -  Assess patient's ability to carry out ADLs; assess patient's baseline for ADL function and identify physical deficits which impact ability to perform ADLs (bathing, care of mouth/teeth, toileting, grooming, dressing, etc )  - Assess/evaluate cause of self-care deficits   - Assess range of motion  - Assess patient's mobility; develop plan if impaired  - Assess patient's need for assistive devices and provide as appropriate  - Encourage maximum independence but intervene and supervise when necessary  - Involve family in performance of ADLs  - Assess for home care needs following discharge   - Consider OT consult to assist with ADL evaluation and planning for discharge  - Provide patient education as appropriate  Outcome: Not Progressing  Goal: Maintains/Returns to pre admission functional level  Description: INTERVENTIONS:  - Perform BMAT or MOVE assessment daily    - Set and communicate daily mobility goal to care team and patient/family/caregiver  - Collaborate with rehabilitation services on mobility goals if consulted  - Perform Range of Motion  times a day  - Reposition patient every  hours  - Dangle patient  times a day  - Stand patient  times a day  - Ambulate patient  times a day  - Out of bed to chair  times a day   - Out of bed for meals  times a day  - Out of bed for toileting  - Record patient progress and toleration of activity level   Outcome: Not Progressing     Problem: DISCHARGE PLANNING  Goal: Discharge to home or other facility with appropriate resources  Description: INTERVENTIONS:  - Identify barriers to discharge w/patient and caregiver  - Arrange for needed discharge resources and transportation as appropriate  - Identify discharge learning needs (meds, wound care, etc )  - Arrange for interpretive services to assist at discharge as needed  - Refer to Case Management Department for coordinating discharge planning if the patient needs post-hospital services based on physician/advanced practitioner order or complex needs related to functional status, cognitive ability, or social support system  Outcome: Not Progressing     Problem: Knowledge Deficit  Goal: Patient/family/caregiver demonstrates understanding of disease process, treatment plan, medications, and discharge instructions  Description: Complete learning assessment and assess knowledge base    Interventions:  - Provide teaching at level of understanding  - Provide teaching via preferred learning methods  Outcome: Not Progressing     Problem: RESPIRATORY - ADULT  Goal: Achieves optimal ventilation and oxygenation  Description: INTERVENTIONS:  - Assess for changes in respiratory status  - Assess for changes in mentation and behavior  - Position to facilitate oxygenation and minimize respiratory effort  - Oxygen administered by appropriate delivery if ordered  - Initiate smoking cessation education as indicated  - Encourage broncho-pulmonary hygiene including cough, deep breathe, Incentive Spirometry  - Assess the need for suctioning and aspirate as needed  - Assess and instruct to report SOB or any respiratory difficulty  - Respiratory Therapy support as indicated  Outcome: Not Progressing     Problem: SKIN/TISSUE INTEGRITY - ADULT  Goal: Skin Integrity remains intact(Skin Breakdown Prevention)  Description: Assess:  -Perform Marcelo assessment every   -Clean and moisturize skin every   -Inspect skin when repositioning, toileting, and assisting with ADLS  -Assess under medical devices such as  every   -Assess extremities for adequate circulation and sensation     Bed Management:  -Have minimal linens on bed & keep smooth, unwrinkled  -Change linens as needed when moist or perspiring  -Avoid sitting or lying in one position for more than  hours while in bed  -Keep HOB at degrees     Toileting:  -Offer bedside commode  -Assess for incontinence every   -Use incontinent care products after each incontinent episode such as     Activity:  -Mobilize patient  times a day  -Encourage activity and walks on unit  -Encourage or provide ROM exercises   -Turn and reposition patient every  Hours  -Use appropriate equipment to lift or move patient in bed  -Instruct/ Assist with weight shifting every  when out of bed in chair  -Consider limitation of chair time  hour intervals    Skin Care:  -Avoid use of baby powder, tape, friction and shearing, hot water or constrictive clothing  -Relieve pressure over bony prominences using   -Do not massage red bony areas    Next Steps:  -Teach patient strategies to minimize risks such as    -Consider consults to  interdisciplinary teams such as  Outcome: Not Progressing     Problem: MUSCULOSKELETAL - ADULT  Goal: Maintain or return mobility to safest level of function  Description: INTERVENTIONS:  - Assess patient's ability to carry out ADLs; assess patient's baseline for ADL function and identify physical deficits which impact ability to perform ADLs (bathing, care of mouth/teeth, toileting, grooming, dressing, etc )  - Assess/evaluate cause of self-care deficits   - Assess range of motion  - Assess patient's mobility  - Assess patient's need for assistive devices and provide as appropriate  - Encourage maximum independence but intervene and supervise when necessary  - Involve family in performance of ADLs  - Assess for home care needs following discharge   - Consider OT consult to assist with ADL evaluation and planning for discharge  - Provide patient education as appropriate  Outcome: Not Progressing

## 2023-03-20 NOTE — PROGRESS NOTES
PULMONARY RESIDENCY PROGRESS NOTE     Name: Dagmar Jeronimo   Age & Sex: 56 y o  male   MRN: [id number]  Unit/Bed#: [facility] 316-01   Encounter: [id number]      PATIENT INFORMATION     Name: Dagmar Jeronimo   Age & Sex: 56 y o  male   MRN: [id number]  Hospital Stay Days: 2    ASSESSMENT/PLAN     Principal Problem:    Severe persistent asthma with acute exacerbation  Active Problems:    Essential hypertension    Type 2 diabetes mellitus, without long-term current use of insulin ([facility] )    Mixed hyperlipidemia    Morbid obesity due to excess calories ([facility])    VICKY (obstructive sleep apnea)    Hyponatremia    Chronic diastolic heart failure (HCC)    Paroxysmal atrial fibrillation (HCC)    Stage 3a chronic kidney disease ([facility] )    1  Severe Persistent Asthma  FEV1 39% to 58% improvement post bronchodilator, ratio 49%  Eosinophilia 7% with total IgE >400, 9% on admission    -Continue one more day of IV steroids  -Tomorrow transition to oral steroids, 50 mg BID oral  -Continue Symbicort 2 puff BID  -Xopenex nebulizer TID  -Singulair bedtime daily  -Umeclidinium inhaler daily  -Continue mucinex, incentive spirometry, respiratory protocol, ambulation  -Outpatient consideration for Fasenra      2  Pulmonary Hypertension with components of group 2 and 3  Moderate VICKY/OHS noncompliant on CPAP  Morbid obesity BMI 44    Diastolic HF with sensor device report PA mean pressure 24-26  PCWP 10 on RH cath  RV 35/4, PA 35/12/21   No RVH or dilation on Echo  HCO3 range 21-31    -outpatient sleep eval for new device  -Encourage weight loss    3  LLL pulmonary nodule  0 6 cm visualized on Feb 2022 CTPE    -due for 12 month follow up CT  -Outpatient noncontrast CT      4   Chronic HFpEF, NYHA III  LVEF 55%  BNP unremarkable at 730 (last admission 2500)  appears euvolemic  On bumex 3mg BID  Weights stable at 335 lbs, net -3L        SUBJECTIVE     Dee Barr is a 56 yr old M with PMH of HFpEF, CKD 3a, PAF on Eliquis, severe asthma presenting with SOB on Saturday due to exertional dyspnea  Onset Monday last week, exertional dyspnea, chronic productive cough  Symptoms persisted even after diuretics increased by cardiology  Patient seen and examined  No acute events overnight  This morning patient admits to increased shortness of breath with feeling like he can't fully exhale  Some improvement with morning breathing treatment  Persistent chronic cough, nonproductive this AM  Feels like he wants one more day of IV steroids, usually has 5 day course  Last admissions September and May of 2022  Able to ambulate to 3 rooms away and back before exertional dyspnea  Ambulates to bathroom and tolerating diet well  Former occasional cigar smoker but quit 2 years ago, had childhood asthma  States he sleeps well without CPAP  OBJECTIVE     Afebrile  70-93 bpm  94-97 O2  RR 20-22  -155      Vitals:    23 0600 23 0735 23 0745 23 0823   BP:   121/60 130/70   BP Location:   Left arm    Pulse:    93   Resp:       Temp:   97 6 °F (36 4 °C)    TempSrc:   Oral    SpO2:  94% 98%    Weight: (!) 152 kg (335 lb 1 6 oz)         Temperature:   Temp (24hrs), Av 9 °F (36 6 °C), Min:97 6 °F (36 4 °C), Max:98 4 °F (36 9 °C)    Temperature: 97 6 °F (36 4 °C)  Intake & Output:  I/O        0701   0700  0701   0700  0701   0700    P  O   320     Total Intake(mL/kg)  320 (2 1)     Urine (mL/kg/hr) 420 3075 (0 8)     Total Output 420 3075     Net -420 -6585                Weights:        Body mass index is 44 21 kg/m²  Weight (last 2 days)     Date/Time Weight    23 0600 152 (335 1)    23 0542 152 (335 1)    23 0115 152 (335 1)        Physical Exam  Vitals reviewed  Constitutional:       General: He is not in acute distress  Appearance: He is obese  HENT:      Head: Normocephalic and atraumatic  Mouth/Throat:      Pharynx: Oropharynx is clear     Cardiovascular:      Rate and Rhythm: Normal rate and regular rhythm  Pulses: Normal pulses  Heart sounds: Normal heart sounds  Pulmonary:      Effort: Pulmonary effort is normal  No respiratory distress  Breath sounds: Wheezing (bilateral expiratory) present  No rhonchi or rales  Abdominal:      Palpations: Abdomen is soft  Tenderness: There is no abdominal tenderness  Musculoskeletal:         General: Normal range of motion  Cervical back: Normal range of motion and neck supple  Right lower leg: Edema present  Left lower leg: Edema present  Skin:     General: Skin is warm  Neurological:      Mental Status: He is alert and oriented to person, place, and time  LABORATORY DATA     Labs: I have personally reviewed pertinent reports  Results from last 7 days   Lab Units 03/19/23  0556 03/18/23  1738   WBC Thousand/uL 9 09 9 65   HEMOGLOBIN g/dL 11 4* 11 1*   HEMATOCRIT % 36 7 35 3*   PLATELETS Thousands/uL 272 307   NEUTROS PCT % 95* 60   MONOS PCT % 1* 8      Results from last 7 days   Lab Units 03/19/23  0556 03/18/23  1738   POTASSIUM mmol/L 4 5 4 0   CHLORIDE mmol/L 98 105   CO2 mmol/L 24 25   BUN mg/dL 27* 19   CREATININE mg/dL 1 79* 1 53*   CALCIUM mg/dL 9 1 8 5   ALK PHOS U/L 107 101   ALT U/L 18 15   AST U/L 15 13     Results from last 7 days   Lab Units 03/19/23  0556   MAGNESIUM mg/dL 2 0     Results from last 7 days   Lab Units 03/19/23  0556   PHOSPHORUS mg/dL 3 0                      IMAGING & DIAGNOSTIC TESTING     Radiology Results: I have personally reviewed pertinent reports  XR chest 2 views    Result Date: 3/19/2023  Impression: No acute cardiopulmonary disease  Workstation performed: [id number]     Other Diagnostic Testing: I have personally reviewed pertinent reports      Pulmonary Functions Testing Results:  July 2021- severe obstruction, FEV1/FVC 49%, FEV1 39%, DLCO 65%, air trapping  Significant improvement post bronchodilator  FEV1 39% to 58%    March 2022 RH cath  PCWP 10  RV 35/4, PA 35/12/21 Feb 2022 CTPE  No pulmonary embolus identified  No acute inflammatory process noted  0 6 cm left lower lobe pulmonary nodule  Recommend 12 month follow-up noncontrast CT of the chest to assess stability  Mild superior endplate height loss at T11 and T12 with associated Schmorl's nodes, favored to be chronic  Hepatic steatosis      Cholelithiasis        ACTIVE MEDICATIONS     Current Facility-Administered Medications   Medication Dose Route Frequency   • acetaminophen (TYLENOL) tablet 650 mg  650 mg Oral Q6H PRN   • aluminum-magnesium hydroxide-simethicone (MYLANTA) oral suspension 30 mL  30 mL Oral Q6H PRN   • amLODIPine (NORVASC) tablet 5 mg  5 mg Oral Daily   • apixaban (ELIQUIS) tablet 5 mg  5 mg Oral BID   • atorvastatin (LIPITOR) tablet 10 mg  10 mg Oral Daily   • benzonatate (TESSALON PERLES) capsule 100 mg  100 mg Oral TID PRN   • budesonide-formoterol (SYMBICORT) 160-4 5 mcg/act inhaler 2 puff  2 puff Inhalation BID   • bumetanide (BUMEX) tablet 3 mg  3 mg Oral BID   • docusate sodium (COLACE) capsule 100 mg  100 mg Oral BID PRN   • guaiFENesin (MUCINEX) 12 hr tablet 600 mg  600 mg Oral BID   • insulin glargine (LANTUS) subcutaneous injection 10 Units 0 1 mL  10 Units Subcutaneous QAM   • insulin lispro (HumaLOG) 100 units/mL subcutaneous injection 1-5 Units  1-5 Units Subcutaneous HS   • insulin lispro (HumaLOG) 100 units/mL subcutaneous injection 2-12 Units  2-12 Units Subcutaneous TID AC   • insulin lispro (HumaLOG) 100 units/mL subcutaneous injection 5 Units  5 Units Subcutaneous TID With Meals   • levalbuterol (XOPENEX) inhalation solution 1 25 mg  1 25 mg Nebulization TID    And   • sodium chloride 0 9 % inhalation solution 3 mL  3 mL Nebulization TID   • methylPREDNISolone sodium succinate (Solu-MEDROL) injection 40 mg  40 mg Intravenous Q12H ANA   • metoprolol succinate (TOPROL-XL) 24 hr tablet 50 mg  50 mg Oral Daily   • montelukast (SINGULAIR) tablet 10 mg  10 mg Oral HS   • ondansetron (ZOFRAN) injection 4 mg  4 mg Intravenous Q6H PRN   • potassium chloride (K-DUR,KLOR-CON) CR tablet 40 mEq  40 mEq Oral BID   • pregabalin (LYRICA) capsule 50 mg  50 mg Oral HS   • triamcinolone (KENALOG) 0 1 % cream   Topical BID   • umeclidinium 62 5 mcg/actuation inhaler AEPB 1 puff  1 puff Inhalation Daily       VTE Pharmacologic Prophylaxis: Eliquis  VTE Mechanical Prophylaxis: reason for no mechanical VTE prophylaxis      Portions of the record may have been created with voice recognition software  Occasional wrong word or "sound a like" substitutions may have occurred due to the inherent limitations of voice recognition software    Read the chart carefully and recognize, using context, where substitutions have occurred   ==  Carol Bell MD  [id number]  Internal Medicine Residency PGY-1

## 2023-03-20 NOTE — ASSESSMENT & PLAN NOTE
Patient was seen by cardiology 3 days ago and was assessed for increased heart failure  Was given Bumex 4 mg twice daily for 1 day then to resume at the 3 mg twice daily as his usual dose with Zaroxolyn at 2 5 mg daily  BNP is not significantly increased from previous  Has more wheezing rather than crackles  · Pulmonary following, continue IV SoluMedrol with plan to transition to prednisone tomorrow  · Continue home diuretic dosing  · Respiratory protocol and support  · Continue Symbicort 160/4 5 twice daily  · Albuterol nebulizations 3 times daily for now  · Continue Singulair    · Anticipate discharge home in next 24 hours

## 2023-03-20 NOTE — ASSESSMENT & PLAN NOTE
Wt Readings from Last 3 Encounters:   03/20/23 (!) 152 kg (335 lb 1 6 oz)   03/15/23 (!) 151 kg (333 lb)   · 12/28/22 · (!) 148 kg (326 lb)   · Volume status stable  Did receive 1 dose of IV Lasix  · Metoprolol succinate 50 mg daily  · Continue home Bumex  Holding further doses of metolazone for now    · Continue fluid/sodium restrictions  · Known to heart failure service outpatient

## 2023-03-21 ENCOUNTER — TELEPHONE (OUTPATIENT)
Dept: PULMONOLOGY | Facility: CLINIC | Age: 58
End: 2023-03-21

## 2023-03-21 ENCOUNTER — TRANSITIONAL CARE MANAGEMENT (OUTPATIENT)
Dept: FAMILY MEDICINE CLINIC | Facility: CLINIC | Age: 58
End: 2023-03-21

## 2023-03-21 VITALS
OXYGEN SATURATION: 92 % | DIASTOLIC BLOOD PRESSURE: 62 MMHG | RESPIRATION RATE: 16 BRPM | BODY MASS INDEX: 43.8 KG/M2 | TEMPERATURE: 97.7 F | WEIGHT: 315 LBS | HEART RATE: 68 BPM | SYSTOLIC BLOOD PRESSURE: 131 MMHG

## 2023-03-21 LAB
ANION GAP SERPL CALCULATED.3IONS-SCNC: 5 MMOL/L (ref 4–13)
BUN SERPL-MCNC: 44 MG/DL (ref 5–25)
CALCIUM SERPL-MCNC: 9.1 MG/DL (ref 8.3–10.1)
CHLORIDE SERPL-SCNC: 99 MMOL/L (ref 96–108)
CO2 SERPL-SCNC: 30 MMOL/L (ref 21–32)
CREAT SERPL-MCNC: 1.58 MG/DL (ref 0.6–1.3)
GFR SERPL CREATININE-BSD FRML MDRD: 48 ML/MIN/1.73SQ M
GLUCOSE SERPL-MCNC: 259 MG/DL (ref 65–140)
GLUCOSE SERPL-MCNC: 269 MG/DL (ref 65–140)
POTASSIUM SERPL-SCNC: 4.4 MMOL/L (ref 3.5–5.3)
SODIUM SERPL-SCNC: 134 MMOL/L (ref 135–147)

## 2023-03-21 RX ORDER — PREDNISONE 10 MG/1
TABLET ORAL
Qty: 45 TABLET | Refills: 0 | Status: SHIPPED | OUTPATIENT
Start: 2023-03-22 | End: 2023-04-06

## 2023-03-21 RX ORDER — LEVALBUTEROL INHALATION SOLUTION 1.25 MG/3ML
1.25 SOLUTION RESPIRATORY (INHALATION) AS NEEDED
Start: 2023-03-21

## 2023-03-21 RX ORDER — PANTOPRAZOLE SODIUM 40 MG/1
40 TABLET, DELAYED RELEASE ORAL DAILY
Qty: 21 TABLET | Refills: 0 | Status: SHIPPED | OUTPATIENT
Start: 2023-03-21

## 2023-03-21 RX ADMIN — INSULIN LISPRO 6 UNITS: 100 INJECTION, SOLUTION INTRAVENOUS; SUBCUTANEOUS at 06:19

## 2023-03-21 RX ADMIN — APIXABAN 5 MG: 5 TABLET, FILM COATED ORAL at 08:19

## 2023-03-21 RX ADMIN — AMLODIPINE BESYLATE 5 MG: 5 TABLET ORAL at 08:19

## 2023-03-21 RX ADMIN — GUAIFENESIN 600 MG: 600 TABLET, EXTENDED RELEASE ORAL at 08:19

## 2023-03-21 RX ADMIN — POTASSIUM CHLORIDE 40 MEQ: 1500 TABLET, EXTENDED RELEASE ORAL at 08:19

## 2023-03-21 RX ADMIN — ISODIUM CHLORIDE 3 ML: 0.03 SOLUTION RESPIRATORY (INHALATION) at 07:56

## 2023-03-21 RX ADMIN — LEVALBUTEROL HYDROCHLORIDE 1.25 MG: 1.25 SOLUTION, CONCENTRATE RESPIRATORY (INHALATION) at 07:57

## 2023-03-21 RX ADMIN — UMECLIDINIUM 1 PUFF: 62.5 AEROSOL, POWDER ORAL at 08:35

## 2023-03-21 RX ADMIN — BUMETANIDE 3 MG: 1 TABLET ORAL at 08:19

## 2023-03-21 RX ADMIN — INSULIN LISPRO 5 UNITS: 100 INJECTION, SOLUTION INTRAVENOUS; SUBCUTANEOUS at 08:32

## 2023-03-21 RX ADMIN — METOPROLOL SUCCINATE 50 MG: 50 TABLET, EXTENDED RELEASE ORAL at 08:19

## 2023-03-21 RX ADMIN — INSULIN GLARGINE 10 UNITS: 100 INJECTION, SOLUTION SUBCUTANEOUS at 08:31

## 2023-03-21 RX ADMIN — ATORVASTATIN CALCIUM 10 MG: 10 TABLET, FILM COATED ORAL at 08:19

## 2023-03-21 RX ADMIN — BUDESONIDE AND FORMOTEROL FUMARATE DIHYDRATE 2 PUFF: 160; 4.5 AEROSOL RESPIRATORY (INHALATION) at 08:35

## 2023-03-21 RX ADMIN — METHYLPREDNISOLONE SODIUM SUCCINATE 40 MG: 40 INJECTION, POWDER, FOR SOLUTION INTRAMUSCULAR; INTRAVENOUS at 08:20

## 2023-03-21 NOTE — DISCHARGE SUMMARY
Discharge Summary - [address] Internal Medicine    Patient Information: Raul Jarrell 56 y o  male MRN: [id number]  Unit/Bed#: [facility] 316-01 Encounter: [id number]    Discharging Physician / Practitioner: RODRIGUE Lema  PCP: Kaylee Beach MD  Admission Date: 3/18/2023  Discharge Date: 03/21/23    Reason for Admission: Severe asthma with exacerbation    Discharge Diagnoses:     Principal Problem:    Severe persistent asthma with acute exacerbation  Active Problems:    Stage 3a chronic kidney disease ([facility] )    Type 2 diabetes mellitus, without long-term current use of insulin ([facility])    VICKY (obstructive sleep apnea)    Hyponatremia    Morbid obesity due to excess calories ([facility])    Mixed hyperlipidemia    Paroxysmal atrial fibrillation ([email or web address] )    Essential hypertension    Chronic diastolic heart failure ([email or web address] )  Resolved Problems:    * No resolved hospital problems  *      Consultations During Hospital Stay:  · Pulmonology    Procedures Performed:     · None    Significant Findings / Test Results:     · CXR negative    Incidental Findings:   · none     Test Results Pending at Discharge (will require follow up):   · none     Outpatient Tests Requested:  · Outpatient f/u with PCP  · Outpatient f/u with pulmonary office for instruction regarding Tavarez Parminder injections and routine f/u  · Outpatient f/u with heart failure    Complications:  None     Hospital Course:     Raul Jarrell is a 56 y o  male patient with a past medical history of severe persistent asthma, pulmonary hypertension, diastolic heart failure, VICKY, morbid obesity, CKD stage III, type 2 diabetes, hyperlipidemia, hypertension who originally presented to the hospital on 3/18/2023 due to shortness of breath  Felt to be secondary to asthma exacerbation, improved with IV steroids  Seen by pulmonology  Transitioning to oral prednisone taper on discharge  Is scheduled to follow-up with pulmonary for Fasenra injections and post hospital f/u   Volume status is stable, instructed to continue diuretics at current dose  Instructed to monitor weights and call heart failure team with any weight gain or worsening edema  Stable for discharge home  Condition at Discharge: stable     Discharge Day Visit / Exam:     Subjective: Patient offers no acute complaints  Feels as though sometimes when he wakes up, he is more short of breath but this quickly subsides  We discussed that this could possibly be secondary to sleep apnea as he does not utilize CPAP  Patient has some mild lower extremity edema which is his baseline  He has compression socks on  He is agreeable for discharge home today  Vitals: Blood Pressure: 142/73 (03/20/23 2148)  Pulse: 78 (03/20/23 2148)  Temperature: 98 3 °F (36 8 °C) (03/20/23 2148)  Temp Source: Oral (03/20/23 2148)  Respirations: 18 (03/20/23 2148)  Weight - Scale: (!) 151 kg (332 lb 0 2 oz) (03/21/23 0557)  SpO2: 94 % (03/21/23 5427)  Exam:   Physical Exam  Vitals and nursing note reviewed  Constitutional:       General: He is not in acute distress  Appearance: He is obese  Comments: On room air   Cardiovascular:      Rate and Rhythm: Normal rate  Pulmonary:      Effort: No respiratory distress  Breath sounds: No rales  Comments: Fine scattered wheezes, moving more air today compared to yesterday  Abdominal:      Tenderness: There is no abdominal tenderness  Musculoskeletal:         General: Swelling (mild LE edema, compression stockings on) present  Skin:     General: Skin is warm  Neurological:      Mental Status: He is alert and oriented to person, place, and time  Mental status is at baseline  Psychiatric:         Mood and Affect: Mood normal          Discussion with Family: Patient    Discharge instructions/Information to patient and family:   See after visit summary for information provided to patient and family        Provisions for Follow-Up Care:  See after visit summary for information related to follow-up care and any pertinent home health orders  Disposition:     Home    For Discharges to [facility] SNF:   · Not Applicable to this Patient - Not Applicable to this Patient    Planned Readmission: no     Discharge Statement:  I spent 40 minutes discharging the patient  This time was spent on the day of discharge  I had direct contact with the patient on the day of discharge  Greater than 50% of the total time was spent examining patient, answering all patient questions, arranging and discussing plan of care with patient as well as directly providing post-discharge instructions  Additional time then spent on discharge activities  Discharge Medications:  See after visit summary for reconciled discharge medications provided to patient and family        ** Please Note: This note has been constructed using a voice recognition system **

## 2023-03-21 NOTE — PLAN OF CARE
Problem: Potential for Falls  Goal: Patient will remain free of falls  Description: INTERVENTIONS:  - Educate patient/family on patient safety including physical limitations  - Instruct patient to call for assistance with activity   - Consult OT/PT to assist with strengthening/mobility   - Keep Call bell within reach  - Keep bed low and locked with side rails adjusted as appropriate  - Keep care items and personal belongings within reach  - Initiate and maintain comfort rounds  - Make Fall Risk Sign visible to staff  - Offer Toileting every  Hours, in advance of need  - Initiate/Maintain alarm  - Obtain necessary fall risk management equipment:   - Apply yellow socks and bracelet for high fall risk patients  - Consider moving patient to room near nurses station  Outcome: Progressing     Problem: PAIN - ADULT  Goal: Verbalizes/displays adequate comfort level or baseline comfort level  Description: Interventions:  - Encourage patient to monitor pain and request assistance  - Assess pain using appropriate pain scale  - Administer analgesics based on type and severity of pain and evaluate response  - Implement non-pharmacological measures as appropriate and evaluate response  - Consider cultural and social influences on pain and pain management  - Notify physician/advanced practitioner if interventions unsuccessful or patient reports new pain  Outcome: Progressing     Problem: INFECTION - ADULT  Goal: Absence or prevention of progression during hospitalization  Description: INTERVENTIONS:  - Assess and monitor for signs and symptoms of infection  - Monitor lab/diagnostic results  - Monitor all insertion sites, i e  indwelling lines, tubes, and drains  - Monitor endotracheal if appropriate and nasal secretions for changes in amount and color  - Mead appropriate cooling/warming therapies per order  - Administer medications as ordered  - Instruct and encourage patient and family to use good hand hygiene technique  - Identify and instruct in appropriate isolation precautions for identified infection/condition  Outcome: Progressing  Goal: Absence of fever/infection during neutropenic period  Description: INTERVENTIONS:  - Monitor WBC    Outcome: Progressing     Problem: SAFETY ADULT  Goal: Patient will remain free of falls  Description: INTERVENTIONS:  - Educate patient/family on patient safety including physical limitations  - Instruct patient to call for assistance with activity   - Consult OT/PT to assist with strengthening/mobility   - Keep Call bell within reach  - Keep bed low and locked with side rails adjusted as appropriate  - Keep care items and personal belongings within reach  - Initiate and maintain comfort rounds  - Make Fall Risk Sign visible to staff  - Offer Toileting every  Hours, in advance of need  - Initiate/Maintain alarm  - Obtain necessary fall risk management equipment:   - Apply yellow socks and bracelet for high fall risk patients  - Consider moving patient to room near nurses station  Outcome: Progressing  Goal: Maintain or return to baseline ADL function  Description: INTERVENTIONS:  -  Assess patient's ability to carry out ADLs; assess patient's baseline for ADL function and identify physical deficits which impact ability to perform ADLs (bathing, care of mouth/teeth, toileting, grooming, dressing, etc )  - Assess/evaluate cause of self-care deficits   - Assess range of motion  - Assess patient's mobility; develop plan if impaired  - Assess patient's need for assistive devices and provide as appropriate  - Encourage maximum independence but intervene and supervise when necessary  - Involve family in performance of ADLs  - Assess for home care needs following discharge   - Consider OT consult to assist with ADL evaluation and planning for discharge  - Provide patient education as appropriate  Outcome: Progressing  Goal: Maintains/Returns to pre admission functional level  Description: INTERVENTIONS:  - Perform BMAT or MOVE assessment daily    - Set and communicate daily mobility goal to care team and patient/family/caregiver  - Collaborate with rehabilitation services on mobility goals if consulted  - Perform Range of Motion  times a day  - Reposition patient every  hours  - Dangle patient  times a day  - Stand patient  times a day  - Ambulate patient  times a day  - Out of bed to chair  times a day   - Out of bed for meals times a day  - Out of bed for toileting  - Record patient progress and toleration of activity level   Outcome: Progressing     Problem: DISCHARGE PLANNING  Goal: Discharge to home or other facility with appropriate resources  Description: INTERVENTIONS:  - Identify barriers to discharge w/patient and caregiver  - Arrange for needed discharge resources and transportation as appropriate  - Identify discharge learning needs (meds, wound care, etc )  - Arrange for interpretive services to assist at discharge as needed  - Refer to Case Management Department for coordinating discharge planning if the patient needs post-hospital services based on physician/advanced practitioner order or complex needs related to functional status, cognitive ability, or social support system  Outcome: Progressing     Problem: Knowledge Deficit  Goal: Patient/family/caregiver demonstrates understanding of disease process, treatment plan, medications, and discharge instructions  Description: Complete learning assessment and assess knowledge base    Interventions:  - Provide teaching at level of understanding  - Provide teaching via preferred learning methods  Outcome: Progressing     Problem: RESPIRATORY - ADULT  Goal: Achieves optimal ventilation and oxygenation  Description: INTERVENTIONS:  - Assess for changes in respiratory status  - Assess for changes in mentation and behavior  - Position to facilitate oxygenation and minimize respiratory effort  - Oxygen administered by appropriate delivery if ordered  - Initiate smoking cessation education as indicated  - Encourage broncho-pulmonary hygiene including cough, deep breathe, Incentive Spirometry  - Assess the need for suctioning and aspirate as needed  - Assess and instruct to report SOB or any respiratory difficulty  - Respiratory Therapy support as indicated  Outcome: Progressing     Problem: SKIN/TISSUE INTEGRITY - ADULT  Goal: Skin Integrity remains intact(Skin Breakdown Prevention)  Description: Assess:  -Perform Marcelo assessment every   -Clean and moisturize skin every   -Inspect skin when repositioning, toileting, and assisting with ADLS  -Assess under medical devices such as  every   -Assess extremities for adequate circulation and sensation     Bed Management:  -Have minimal linens on bed & keep smooth, unwrinkled  -Change linens as needed when moist or perspiring  -Avoid sitting or lying in one position for more than hours while in bed  -Keep HOB at degrees     Toileting:  -Offer bedside commode  -Assess for incontinence every   -Use incontinent care products after each incontinent episode such as     Activity:  -Mobilize patient  times a day  -Encourage activity and walks on unit  -Encourage or provide ROM exercises   -Turn and reposition patient every Hours  -Use appropriate equipment to lift or move patient in bed  -Instruct/ Assist with weight shifting every  when out of bed in chair  -Consider limitation of chair time  hour intervals    Skin Care:  -Avoid use of baby powder, tape, friction and shearing, hot water or constrictive clothing  -Relieve pressure over bony prominences using   -Do not massage red bony areas    Next Steps:  -Teach patient strategies to minimize risks such as    -Consider consults to  interdisciplinary teams such as   Outcome: Progressing     Problem: MUSCULOSKELETAL - ADULT  Goal: Maintain or return mobility to safest level of function  Description: INTERVENTIONS:  - Assess patient's ability to carry out ADLs; assess patient's baseline for ADL function and identify physical deficits which impact ability to perform ADLs (bathing, care of mouth/teeth, toileting, grooming, dressing, etc )  - Assess/evaluate cause of self-care deficits   - Assess range of motion  - Assess patient's mobility  - Assess patient's need for assistive devices and provide as appropriate  - Encourage maximum independence but intervene and supervise when necessary  - Involve family in performance of ADLs  - Assess for home care needs following discharge   - Consider OT consult to assist with ADL evaluation and planning for discharge  - Provide patient education as appropriate  Outcome: Progressing

## 2023-03-21 NOTE — TELEPHONE ENCOUNTER
Patient returned call  He wanted to come in asa for the injection  Patient scheduled for 03/22/23 in [place] for initial injection of Edgar Sana  Advised patient to bring Epi-pen as well

## 2023-03-21 NOTE — TELEPHONE ENCOUNTER
lvm for pt to call my direct line back to reset up the initial injection of Naveen Slack in the office

## 2023-03-21 NOTE — DISCHARGE INSTR - AVS FIRST PAGE
Keon,    Take your oral steroids as prescribed starting tomorrow  You already received IV dose today prior to discharge  I have also prescribed you Protonix to take while you are on steroids, this is to help protect your stomach as steroids can be irritating  Take your Bumex at your previous dose, monitor your weights and call Dr Jose Rafael Gibson for any weight gain/worsening swelling       Thanks and be well,    Mindi Falk [facility]

## 2023-03-21 NOTE — TELEPHONE ENCOUNTER
----- Message from [address] sent at 3/20/2023  1:09 PM EDT -----  Regarding: RE: Arti Webb  Thank you   ----- Message -----  From: Conchita Melchor  Sent: 3/20/2023  12:27 PM EDT  To: Mook Riley MD, Sharla May  Subject: RE: Justin William will call patient once he is discharged from the hospital to set up [address] [place] [address] teaching        ----- Message -----  From: [address]  Sent: 3/20/2023  85:80 AM EDT  To: Conchita Melchor  Subject: Arti Webb                                          I got this patient set up for a follow up with Dr Ulises Caballero the only other thing was he needs to get set up for Arti Webb   ----- Message -----  From: Tre Kirby MD  Sent: 3/20/2023   8:51 AM EDT  To: Pulmonary [address] [facility],  Can we arrange for hospital follow up with Ulises Caballero in the next few weeks and set him up for Maynore Ano injection teaching? Thanks!

## 2023-03-22 ENCOUNTER — OFFICE VISIT (OUTPATIENT)
Dept: PULMONOLOGY | Facility: CLINIC | Age: 58
End: 2023-03-22

## 2023-03-22 ENCOUNTER — DOCUMENTATION (OUTPATIENT)
Dept: PULMONOLOGY | Facility: CLINIC | Age: 58
End: 2023-03-22

## 2023-03-22 VITALS — DIASTOLIC BLOOD PRESSURE: 82 MMHG | TEMPERATURE: 96.6 F | SYSTOLIC BLOOD PRESSURE: 130 MMHG

## 2023-03-22 DIAGNOSIS — J45.51 SEVERE PERSISTENT ASTHMA WITH ACUTE EXACERBATION: ICD-10-CM

## 2023-03-22 DIAGNOSIS — J45.50 SEVERE PERSISTENT ASTHMA WITHOUT COMPLICATION: Primary | ICD-10-CM

## 2023-03-22 NOTE — PROGRESS NOTES
Respiratory patient here for initial Fasenra injection and for teaching of proper injection technique  Patient has medication with him and was ordered for administration for his nurse visit    =================================    Documentation above by Veronica Terrazas RN    Visit was for first [place] injection  Patient brought the medication with him  I did not personally see or examine this patient    This was solely for nurse visit and injection    Ivonne Kirby MD

## 2023-03-22 NOTE — UTILIZATION REVIEW
NOTIFICATION OF ADMISSION DISCHARGE   This is a Notification of Discharge from [address]  Please be advised that this patient has been discharge from our facility  Below you will find the admission and discharge date and time including the patient’s disposition  UTILIZATION REVIEW CONTACT:  Parminder Marcus  Utilization   Network Utilization Review Department  Phone: [phone] x carefully listen to the prompts  All voicemails are confidential   Email: [email or web address]     ADMISSION INFORMATION  PRESENTATION DATE: 3/18/2023  5:08 PM  OBERVATION ADMISSION DATE:   INPATIENT ADMISSION DATE: 3/18/23  8:27 PM   DISCHARGE DATE: 3/21/2023  9:58 AM   DISPOSITION:Home/Self Care    IMPORTANT INFORMATION:  Send all requests for admission clinical reviews, approved or denied determinations and any other requests to dedicated fax number below belonging to the campus where the patient is receiving treatment   List of dedicated fax numbers:  [address] DENIALS (Administrative/Medical Necessity) [phone]   [address] (Maternity/NICU/Pediatrics) [phone]   [facility] [phone]   [facility] [phone]   [address] [phone]   [address] [facility] [phone]   90 [facility] [phone]   1466 [facility] 119 [phone]   [facility]  [phone] 4058 [facility] [phone] 412 [facility] 850 [facility] [phone]

## 2023-03-28 DIAGNOSIS — I50.32 CHRONIC HEART FAILURE WITH PRESERVED EJECTION FRACTION (HCC): ICD-10-CM

## 2023-03-28 RX ORDER — METOLAZONE 2.5 MG/1
TABLET ORAL
Qty: 90 TABLET | Refills: 1 | Status: SHIPPED | OUTPATIENT
Start: 2023-03-28

## 2023-03-31 ENCOUNTER — TELEPHONE (OUTPATIENT)
Dept: CARDIOLOGY CLINIC | Facility: CLINIC | Age: 58
End: 2023-03-31

## 2023-03-31 NOTE — TELEPHONE ENCOUNTER
LMOM for pt to call me back with update of status  His Cardiomems PA reading's since the 28 th have been 20,14,19,22 Goal is 15  HR was 81,108,89,89

## 2023-04-03 NOTE — TELEPHONE ENCOUNTER
Spoke with pt and he said he feels good  Edema in feet , but nothing major  Wt is 333 lbs today  He said he is still on the steroids and eating a lot  Denies any other CHF symptoms  Taking: Bumex 3 mg bid               Metolazone 2 5 mg prn for wt gain  - has not needed  Potassium 40 mg bid  Do you want him to take extra dose of Bumex/ potassium, or just continue to monitor him?       Please advise

## 2023-04-05 NOTE — PATIENT INSTRUCTIONS
Patient presented to the office for Geraldine Curet injection education  Veronica Terrazas did the presentation with the patient

## 2023-04-07 ENCOUNTER — TELEPHONE (OUTPATIENT)
Dept: CARDIOLOGY CLINIC | Facility: CLINIC | Age: 58
End: 2023-04-07

## 2023-04-07 NOTE — TELEPHONE ENCOUNTER
Just spoke to pt again and he has taken Bumex 4 mg bid on Wednesday and Thursday and took Bumex 4 mg this am and he is getting worse  ( 5 lb wt gain)  Should he do 6 mg bid for 2 days?     Please advise

## 2023-04-07 NOTE — TELEPHONE ENCOUNTER
P/c'd , wt is up 5 lbs(336 LB) in 2 days and he has edema of the feet  They are twice the size  Sob Exertion  His goal PAD is 15, he was up for 6 days 19-24 and was given extra dose of Bumex/ potassium on 4/4/23  He had no symptoms then  Now last 3 days his PAD 17,23,23 and he has symptoms    3/21/23  Cr- 1 58(1 79 on 3/19), K-4 4(4 5), bun-44(27)  3/19/23  Hbg 11 4(11 1 on 3/18/23)    Taking: Bumex 3 mg bid               Potassium 40 meq bid    Dr Yudith Parekh pt  Should he do an extra Bumex 3 mg/ potassium 40 meq for 2 days? I will call him Monday for update        Please advise

## 2023-04-10 PROBLEM — I50.9 ACUTE ON CHRONIC HEART FAILURE (HCC): Status: ACTIVE | Noted: 2023-04-10

## 2023-04-10 NOTE — TELEPHONE ENCOUNTER
Pt's wt is now 338 lbs  Has increased SOB, pitting edema blle, and intermittent chest pain  Always tired, urination the same, appetite uncharged( does not eat much, no issue)  PAD goal is 15 and last 3 days, 16,21,19    Took metolazone/ extra potassium on Sat with no results  Will bring him in today to see Sebastián ULLOA  Do you want a BMP today? Taking : Bumex 4 mg bid                 Potassium 40 meq  Bid                Metolazone 2 5 mg prn for wt gain      Please advise

## 2023-04-17 PROBLEM — R19.5 LOOSE STOOLS: Status: ACTIVE | Noted: 2023-04-17

## 2023-05-01 ENCOUNTER — OFFICE VISIT (OUTPATIENT)
Dept: PULMONOLOGY | Facility: CLINIC | Age: 58
End: 2023-05-01

## 2023-05-01 VITALS
RESPIRATION RATE: 14 BRPM | WEIGHT: 315 LBS | OXYGEN SATURATION: 97 % | DIASTOLIC BLOOD PRESSURE: 58 MMHG | HEIGHT: 73 IN | SYSTOLIC BLOOD PRESSURE: 90 MMHG | TEMPERATURE: 97.9 F | HEART RATE: 93 BPM | BODY MASS INDEX: 41.75 KG/M2

## 2023-05-01 DIAGNOSIS — G47.33 OSA (OBSTRUCTIVE SLEEP APNEA): Primary | Chronic | ICD-10-CM

## 2023-05-01 DIAGNOSIS — J45.50 SEVERE PERSISTENT ASTHMA WITHOUT COMPLICATION: ICD-10-CM

## 2023-05-01 DIAGNOSIS — E66.01 MORBID OBESITY DUE TO EXCESS CALORIES (HCC): Chronic | ICD-10-CM

## 2023-05-01 DIAGNOSIS — J45.51 SEVERE PERSISTENT ASTHMA WITH ACUTE EXACERBATION: ICD-10-CM

## 2023-05-01 NOTE — PROGRESS NOTES
Pulmonary Follow Up Note   Grace Wasserman 56 y o  male MRN: [id number]  5/1/2023      Assessment:    VICKY (obstructive sleep apnea)  Currently not using CPAP therapy  Morbid obesity due to excess calories (HCC)  Continue efforts at dietary modification and self-paced exercise in an effort to lose weight  Now that his respiratory status is more stable hopefully he will be able to be more active  Severe persistent asthma without complication  Patient is doing very well after initiation of Fasenra injections for the past 2 months  His asthma symptoms are much better controlled now  He has decreased his [place] inhaler usage from several times per day to only a couple times per week  He is tolerating his other medications without any difficulty  Plan  Continue Symbicort, Spiriva, Singulair and albuterol  Continue Fasenra injections  He will follow-up in 4 months  At that time if his symptoms are stable we can consider stepdown in therapy and potentially stop his Spiriva  Plan:    Diagnoses and all orders for this visit:    VICKY (obstructive sleep apnea)    Severe persistent asthma with acute exacerbation    Morbid obesity due to excess calories (HCC)    Severe persistent asthma without complication        No follow-ups on file  History of Present Illness   HPI:  Grace Wasserman is a 56 y o  male history of systolic congestive heart failure, morbid obesity (status post gastric bypass 2004), obstructive sleep apnea (on CPAP), moderate persistent asthma, psoriasis, type 2 diabetes  Currently not using PAP therapy  States he is doing good w/o it  Recent CBC showed 7% eosinophilia and total IgE of greater than 400  Here today for follow-up  Patient was last seen in December 2022  At that time we discussed his severe persistent asthma  Spiriva, Symbicort Singulair and albuterol were continued  We also discussed biological therapy  At that time he started [place] injections    We discussed his "obstructive sleep apnea but he felt that he was doing well without PAP therapy  We also discussed weight loss with the recommendation of dietary modification and self-paced exercise  States since being discharged from the hospital few weeks ago (after treatment of heart failure) that he feels \"great\"  He is not experiencing any recurrent shortness of breath, wheezing, chest tightness or cough  He has resumed Fasenra injections  He is only using his albuterol twice per week  Review of Systems   Constitutional: Negative for chills, fatigue and fever  HENT: Negative for congestion, nosebleeds, postnasal drip, rhinorrhea, sinus pressure and sore throat  Eyes: Negative for discharge, redness and itching  Respiratory: Negative for cough, choking, chest tightness, shortness of breath, wheezing and stridor  Cardiovascular: Negative for chest pain, palpitations and leg swelling  Gastrointestinal: Negative for blood in stool  Genitourinary: Negative for difficulty urinating and dysuria  Musculoskeletal: Negative for arthralgias, joint swelling and myalgias  Skin: Negative for color change and rash  Neurological: Negative for light-headedness and headaches  Hematological: Negative for adenopathy     Answers for HPI/ROS submitted by the patient on 12/20/2022  Do you have shortness of breath that occurs with effort or exertion?: Yes  Do you have ear congestion?: No  Do you have heartburn?: No  Do you have fatigue?: No  Do you have nasal congestion?: Yes  Do you have shortness of breath when lying flat?: No  Do you have shortness of breath when you wake up?: Yes  Do you have sweats?: No  Have you experienced weight loss?: No        Historical Information   Past Medical History:   Diagnosis Date    Acute gastritis without hemorrhage     last assessed 3/24/17    Asthma     [facility] fibrillation [facility]     Bilateral leg edema 02/19/2020    CHF (congestive heart failure) ([facility]     Coronary " artery disease     Daytime sleepiness 07/17/2019    [facility]     Dyspnea on exertion 10/11/2021    Edema of both feet 01/23/2020    Gastric bypass status for obesity     Hyperlipidemia     Hypertension     Hypokalemia 11/14/2021    Impaired fasting glucose     last assessed 5/10/17    Knee sprain, bilateral 06/14/2018    Leg cramps 06/16/2022    Obesity     Viral gastroenteritis     last assessed 11/4/16     Past Surgical History:   Procedure Laterality Date    CARDIAC CATHETERIZATION N/A 3/9/2022    Procedure: CARDIAC RHC W/ PRESSURE SENSOR;  Surgeon: Rebecca Lovell MD;  [facility]: [facility]; Service: Cardiology    CARDIAC CATHETERIZATION N/A 9/6/2022    Procedure: Cardiac catheterization;  Surgeon: Mariam Negrete DO;  [facility]; Service: Cardiology    CARDIAC CATHETERIZATION N/A 9/6/2022    Procedure: Cardiac Coronary Angiogram;  Surgeon: Mariam Negrete DO;  [facility];   Service: Cardiology    CARPAL TUNNEL RELEASE      bilateral    GASTRIC BYPASS  2004    with han en y    HERNIA REPAIR      ventral inscisional    TONSILLECTOMY       Family History   Problem Relation Age of Onset    Stroke Mother     Hypertension Father     Cancer Father     COPD Father          Meds/Allergies     Current Outpatient Medications:     Accu-Chek FastClix Lancets MISC, Test blood sugar twice daily, Disp: 200 each, Rfl: 3    albuterol (PROVENTIL HFA,VENTOLIN HFA) 90 mcg/act inhaler, INHALE 2 PUFFS EVERY 4 (FOUR) HOURS AS NEEDED FOR WHEEZING OR SHORTNESS OF BREATH, Disp: 18 g, Rfl: 3    apixaban (Eliquis) 5 mg, Take 1 tablet (5 mg total) by mouth 2 (two) times a day, Disp: 60 tablet, Rfl: 5    atorvastatin (LIPITOR) 10 mg tablet, TAKE 1 TABLET BY MOUTH EVERY DAY, Disp: 90 tablet, Rfl: 2    benzonatate (TESSALON PERLES) 100 mg capsule, Take 1 capsule (100 mg total) by mouth 3 (three) times a day as needed (for cough), Disp: 30 capsule, Rfl: 0    Blood Glucose Monitoring Suppl (Accu-Chek Guide) w/Device KIT, Use 2 (two) times a day Test blood sugar twice daily, Disp: 1 kit, Rfl: 0    budesonide-formoterol (Symbicort) 160-4 5 mcg/act inhaler, Inhale 2 puffs 2 (two) times a day Rinse mouth after use , Disp: 30 6 g, Rfl: 2    bumetanide (BUMEX) 1 mg tablet, Take 3 tablets (3 mg total) by mouth daily, Disp: 90 tablet, Rfl: 0    Empagliflozin (Jardiance) 10 MG TABS tablet, Take 1 tablet (10 mg total) by mouth every morning, Disp: 30 tablet, Rfl: 0    Empagliflozin (JARDIANCE) 10 MG TABS tablet, Take 1 tablet (10 mg total) by mouth daily Do not start before April 19, 2023 , Disp: 30 tablet, Rfl: 0    Fasenra Pen 30 MG/ML SOAJ, , Disp: , Rfl:     levalbuterol (Xopenex) 1 25 mg/3 mL nebulizer solution, Take 3 mL (1 25 mg total) by nebulization as needed for wheezing or shortness of breath, Disp: , Rfl:     metolazone (ZAROXOLYN) 2 5 mg tablet, 1 tablet as need for weight gain of 3 lbs in a day or 5 lbs in 5-7 days or as directed, Disp: 90 tablet, Rfl: 1    pantoprazole (PROTONIX) 40 mg tablet, Take 1 tablet (40 mg total) by mouth daily, Disp: 21 tablet, Rfl: 0    potassium chloride (K-DUR,KLOR-CON) 20 mEq tablet, Take 2 tablets (40 mEq total) by mouth 3 (three) times a day, Disp: 180 tablet, Rfl: 0    pregabalin (LYRICA) 50 mg capsule, Take 1 caps   at bedtime, Disp: 30 capsule, Rfl: 5    sitaGLIPtin (Januvia) 100 mg tablet, Take 1/2 tab  daily, Disp: 30 tablet, Rfl: 5    Spiriva Respimat 1 25 MCG/ACT AERS inhaler, INHALE 2 PUFFS BY MOUTH EVERY DAY, Disp: 4 g, Rfl: 5    EPINEPHrine (EPIPEN) 0 3 mg/0 3 mL SOAJ, Inject 0 3 mL (0 3 mg total) into a muscle once for 1 dose, Disp: 0 6 mL, Rfl: 0    metoprolol succinate (TOPROL-XL) 50 mg 24 hr tablet, Take 1 tablet (50 mg total) by mouth daily (Patient not taking: Reported on 5/1/2023), Disp: 90 tablet, Rfl: 3    montelukast (SINGULAIR) 10 mg tablet, Take 1 tablet (10 mg total) by mouth "daily at bedtime, Disp: 90 tablet, Rfl: 1  Allergies   Allergen Reactions    Azithromycin Other (See Comments)     Shaky, uneasy feeling     Bactrim [Sulfamethoxazole-Trimethoprim] Other (See Comments)     shakiness       Vitals: Blood pressure 90/58, pulse 93, temperature 97 9 °F (36 6 °C), temperature source Tympanic, resp  rate 14, height 6' 1\" (1 854 m), weight (!) 149 kg (327 lb 6 4 oz), SpO2 97 %  Body mass index is 43 2 kg/m²  Oxygen Therapy  SpO2: 97 %  Oxygen Therapy: None (Room air)      Physical Exam  Physical Exam  Vitals reviewed  Constitutional:       General: He is not in acute distress  Appearance: He is well-developed  He is not ill-appearing, toxic-appearing or diaphoretic  HENT:      Head: Normocephalic and atraumatic  Right Ear: External ear normal       Left Ear: External ear normal       Nose: Nose normal  No congestion or rhinorrhea  Mouth/Throat:      Pharynx: No oropharyngeal exudate or posterior oropharyngeal erythema  Eyes:      General: No scleral icterus  Right eye: No discharge  Left eye: No discharge  Conjunctiva/sclera: Conjunctivae normal       Pupils: Pupils are equal, round, and reactive to light  Neck:      Trachea: No tracheal deviation  Cardiovascular:      Rate and Rhythm: Normal rate and regular rhythm  Heart sounds: Normal heart sounds  No murmur heard  No friction rub  No gallop  Pulmonary:      Effort: Pulmonary effort is normal       Breath sounds: Normal breath sounds  No stridor  No wheezing or rales  Musculoskeletal:      Cervical back: Normal range of motion  Right lower leg: No edema  Left lower leg: No edema  Lymphadenopathy:      Head:      Right side of head: No submental, submandibular, preauricular or posterior auricular adenopathy  Left side of head: No submental, submandibular, preauricular or posterior auricular adenopathy  Cervical: No cervical adenopathy     Skin:     General: " Skin is warm and dry  Findings: No lesion or rash  Neurological:      Mental Status: He is alert and oriented to person, place, and time  Labs: I have personally reviewed pertinent lab results  Lab Results   Component Value Date    WBC 6 18 04/18/2023    HGB 11 3 (L) 04/18/2023    HCT 35 9 (L) 04/18/2023    MCV 85 04/18/2023     04/18/2023     Lab Results   Component Value Date    CALCIUM 8 8 04/18/2023    K 3 9 04/18/2023    CO2 25 04/18/2023     04/18/2023    BUN 35 (H) 04/18/2023    CREATININE 2 23 (H) 04/18/2023     Lab Results   Component Value Date     (H) 12/12/2022     Lab Results   Component Value Date    ALT 20 04/12/2023    AST 17 04/12/2023    ALKPHOS 91 04/12/2023       Imaging and other studies: I have personally reviewed pertinent reports  and I have personally reviewed pertinent films in PACS     11/9/2021   FINDINGS:     Cardiomediastinal silhouette appears unremarkable      The lungs are clear    No pneumothorax or pleural effusion      Osseous structures appear within normal limits for patient age      IMPRESSION:     No acute cardiopulmonary disease       Chest x-ray September 22, 2020  IMPRESSION:     No acute cardiopulmonary disease        Pulmonary function testing:  October 22, 2021    Results:  FVC: 3 31 L       61 % predicted  FEV1: 1 63 L     39 % predicted  FEV1/FVC Ratio: 49 %     After administration of bronchodilator   FVC: 3 89 L, 72 % predicted, 17 % change  FEV1: 2 45 L, 58 % predicted, 50 % change     Lung volumes by body plethysmography:   Total Lung Capacity 98 % predicted   Residual volume 183 % predicted     DLCO corrected for patients hemoglobin level: 65 %     Interpretation:     · Severe obstructive airflow defect on spirometry     · Significant improvement in airflow or forced vital capacity in response to the administration of bronchodilator per ATS standards      · Air trapping as indicated by the lung volumes     · Mild decrease in diffusion capacity     · Flow-volume loop consistent with obstruction    EKG, Pathology, and Other Studies: I have personally reviewed pertinent reports  and I have personally reviewed pertinent films in PACS    Echocardiogram July 2021   Left ventricular ejection fraction 38%, grade 1 diastolic dysfunction, mild left atrial dilation, normal RV size and function, no valvular stenosis  MARLIN Sosa    [facility]  Answers for HPI/ROS submitted by the patient on 10/4/2021  Do you experience frequent throat clearing?: Yes  Chronicity: recurrent  When did you first notice your symptoms?: more than 1 month ago  How often do your symptoms occur?: daily  Since you first noticed this problem, how has it changed?: gradually improving  Do you have shortness of breath that occurs with effort or exertion?: Yes  Do you have ear congestion?: No  Do you have heartburn?: No  Do you have fatigue?: Yes  Do you have nasal congestion?: No  Do you have shortness of breath when lying flat?: Yes  Do you have shortness of breath when you wake up?: Yes  Do you have sweats?: No  Have you experienced weight loss?: No  Which of the following makes your symptoms worse?: any activity, exercise, lying down, minimal activity  Which of the following makes your symptoms better?: change in position, diuretics, rest, steroid inhaler      Answers for HPI/ROS submitted by the patient on 9/6/2022  Do you have difficulty breathing?: Yes  Chronicity: recurrent  When did you first notice your symptoms?: 1 to 4 weeks ago  How often do your symptoms occur?: daily  Since you first noticed this problem, how has it changed?: unchanged  Do you have shortness of breath that occurs with effort or exertion?: Yes  Do you have ear congestion?: No  Do you have heartburn?: No  Do you have fatigue?: No  Do you have nasal congestion?: No  Do you have shortness of breath when lying flat?: Yes  Do you have shortness of breath when you wake up?: Yes  Which of the following makes your symptoms worse?: any activity, climbing stairs, lying down, minimal activity  Which of the following makes your symptoms better?: diuretics, oral steroids, steroid inhaler      Answers for HPI/ROS submitted by the patient on 9/16/2022  Do you have difficulty breathing?: Yes  Chronicity: chronic  When did you first notice your symptoms?: 1 to 4 weeks ago  How often do your symptoms occur?: daily  Since you first noticed this problem, how has it changed?: gradually improving  Do you have shortness of breath that occurs with effort or exertion?: No  Do you have ear congestion?: No  Do you have heartburn?: No  Do you have fatigue?: No  Do you have nasal congestion?: No  Do you have shortness of breath when lying flat?: No  Do you have shortness of breath when you wake up?: No  Do you have sweats?: No  Have you experienced weight loss?: No  Which of the following makes your symptoms worse?: any activity, climbing stairs, lying down  Which of the following makes your symptoms better?: rest

## 2023-05-01 NOTE — ASSESSMENT & PLAN NOTE
Patient is doing very well after initiation of Fasenra injections for the past 2 months  His asthma symptoms are much better controlled now  He has decreased his [place] inhaler usage from several times per day to only a couple times per week  He is tolerating his other medications without any difficulty  Plan  Continue Symbicort, Spiriva, Singulair and albuterol  Continue Fasenra injections  He will follow-up in 4 months  At that time if his symptoms are stable we can consider stepdown in therapy and potentially stop his Spiriva

## 2023-05-01 NOTE — ASSESSMENT & PLAN NOTE
Continue efforts at dietary modification and self-paced exercise in an effort to lose weight  Now that his respiratory status is more stable hopefully he will be able to be more active

## 2023-05-03 ENCOUNTER — CLINICAL SUPPORT (OUTPATIENT)
Dept: CARDIOLOGY CLINIC | Facility: CLINIC | Age: 58
End: 2023-05-03

## 2023-05-03 DIAGNOSIS — I50.32 CHRONIC DIASTOLIC HEART FAILURE (HCC): Primary | Chronic | ICD-10-CM

## 2023-05-03 NOTE — PROGRESS NOTES
30 days of Cardiomems data reviewed, analyzed, and interpretation completed  Patient complied with weekly data transmissions

## 2023-05-05 ENCOUNTER — TELEPHONE (OUTPATIENT)
Dept: NEPHROLOGY | Facility: CLINIC | Age: 58
End: 2023-05-05

## 2023-05-05 NOTE — TELEPHONE ENCOUNTER
Left message with patient to see if patient was interested in rescheduling appointment that was missed 03/16/23 with Dr Beyn Carrasco  No show letter was already mailed on 03/16/23

## 2023-05-18 ENCOUNTER — TELEPHONE (OUTPATIENT)
Dept: CARDIOLOGY CLINIC | Facility: CLINIC | Age: 58
End: 2023-05-18

## 2023-05-18 NOTE — TELEPHONE ENCOUNTER
Spoke to pt  He said he is feeling fine  Wt is steady  And denies any CHF symptoms  F/u 6/26/23  He has BMP order that he did not get yet  4/18/23 Cr- 2 23( 2 25 on 4/17/23), K-3 9(3 8), bun-35(34), hbg-11 3(11 4(on 4/16), Na 132(132)    Taking Bumex 3/mg qd               potassium 40 meq tid              Jardiance 10 mg qd               Metolazone 2 5 mg prn for wt gain-  He has not needed        Please advise

## 2023-05-26 ENCOUNTER — TELEPHONE (OUTPATIENT)
Dept: CARDIOLOGY CLINIC | Facility: CLINIC | Age: 58
End: 2023-05-26

## 2023-05-26 DIAGNOSIS — I50.813 ACUTE ON CHRONIC RIGHT-SIDED HEART FAILURE (HCC): ICD-10-CM

## 2023-05-26 DIAGNOSIS — I50.9 CHF EXACERBATION (HCC): ICD-10-CM

## 2023-05-26 DIAGNOSIS — N18.32 TYPE 2 DIABETES MELLITUS WITH STAGE 3B CHRONIC KIDNEY DISEASE, WITHOUT LONG-TERM CURRENT USE OF INSULIN (HCC): Chronic | ICD-10-CM

## 2023-05-26 DIAGNOSIS — E11.22 TYPE 2 DIABETES MELLITUS WITH STAGE 3B CHRONIC KIDNEY DISEASE, WITHOUT LONG-TERM CURRENT USE OF INSULIN (HCC): Chronic | ICD-10-CM

## 2023-05-26 NOTE — TELEPHONE ENCOUNTER
Cardiomems PAD has been 24,22,20( goal -15) for the last 3 days  Spoke with pt  He feels pretty good  Wt is stable  He denies symptoms of Chf  He said he can do an extra dose of bumex today  6/26/23 is F/u appt with you  Will have bmp before next appt      4/18/23  Cr- 2 23, k- 3 9, bun- 35, Na 132    Taking Bumex 3 mg qd              Potassium 40 meq tid              Metolazone 2 5 mg prn    Please advise

## 2023-06-02 ENCOUNTER — CLINICAL SUPPORT (OUTPATIENT)
Dept: CARDIOLOGY CLINIC | Facility: CLINIC | Age: 58
End: 2023-06-02
Payer: COMMERCIAL

## 2023-06-02 DIAGNOSIS — I50.32 CHRONIC DIASTOLIC HEART FAILURE (HCC): Chronic | ICD-10-CM

## 2023-06-02 PROCEDURE — 93264 REM MNTR WRLS P-ART PRS SNR: CPT | Performed by: INTERNAL MEDICINE

## 2023-06-05 NOTE — TELEPHONE ENCOUNTER
Spoke with pt due to his PAD #'s elevated  Goal is 15 and he has been 5/29- 24,  5/30 -25, 5/31- 15 6/1- 19 6/2 - 24 and today 21  He did take extra dose of bumex and potassium 3x's last week he has 3 lb wt gain and edema of the feet  He feels fine now  Taking : Bumex 3 mg qd                Potassium 40 meq tid                Metolazone 2 5 mg prn( has not taken any)    Do you want him to try extra Bumex or the Metolazone?       Please advise

## 2023-06-07 ENCOUNTER — TELEPHONE (OUTPATIENT)
Dept: CARDIOLOGY CLINIC | Facility: CLINIC | Age: 58
End: 2023-06-07

## 2023-06-07 NOTE — TELEPHONE ENCOUNTER
P/c'd to ask his PAD #'s for last 2 days  Called him back, no answer, LMOM for update on his status  Last 2 days PAD was 25,24

## 2023-06-08 DIAGNOSIS — I50.32 CHRONIC DIASTOLIC HEART FAILURE (HCC): Primary | Chronic | ICD-10-CM

## 2023-06-08 NOTE — TELEPHONE ENCOUNTER
Finally got to talk with him  PAS last 4 days 21,25,24,24 and goal of 15  He does have edema of the feet and winded in the am but improves after he takes his medication  His wt is up 2 lbs also  He did take metolazone 2 5 mg on Tues due to the elevated PAD's     4/18/23  Cr- 2 23( 2 32 on 4/15), K-3 9(3 18 on 4/17/23), bun-35(34)  GFR- 31(31), Hbg 11 3( 11 4 on 4/16)    Taking bumex 3 mg qd              Potassium 40 meq tid              Jardiance 10 mg qd              Metolazone 2 5 mg prn    Do you want him to take another dose of metolazone/ potassium or increase the bumex?     Please advise

## 2023-06-12 ENCOUNTER — TELEPHONE (OUTPATIENT)
Dept: CARDIOLOGY CLINIC | Facility: CLINIC | Age: 58
End: 2023-06-12

## 2023-06-12 NOTE — TELEPHONE ENCOUNTER
Spoke with pt and he said his pillow was acting up over the weekend  Reading today on the cardiomems is 20  Goal is 15  Pt states he feels really good  I will continue to monitor and call pt if #'s are abnormal   Verbally understood

## 2023-06-15 ENCOUNTER — TELEPHONE (OUTPATIENT)
Dept: CARDIOLOGY CLINIC | Facility: CLINIC | Age: 58
End: 2023-06-15

## 2023-06-15 DIAGNOSIS — I50.32 CHRONIC DIASTOLIC HEART FAILURE (HCC): Primary | ICD-10-CM

## 2023-06-15 NOTE — TELEPHONE ENCOUNTER
PAD for last 3 days 19,25,28, goal of 15  HR-81-88  Spoke with pt and he states pretty good  Walk around with out getting sob  He edema , wt stable  He has been having problems with the pillow and he needs to reboot and then it will send  He will call the tech support to see if anything is happening as far as the reading's good

## 2023-06-16 NOTE — TELEPHONE ENCOUNTER
Please advise to take metolazone 2 5mg with additional potassium  Obtain BMP ordered on Monday   Thanks

## 2023-06-19 ENCOUNTER — APPOINTMENT (OUTPATIENT)
Dept: LAB | Facility: CLINIC | Age: 58
End: 2023-06-19
Payer: COMMERCIAL

## 2023-06-19 ENCOUNTER — TELEPHONE (OUTPATIENT)
Dept: CARDIOLOGY CLINIC | Facility: CLINIC | Age: 58
End: 2023-06-19

## 2023-06-19 DIAGNOSIS — I50.813 ACUTE ON CHRONIC RIGHT-SIDED HEART FAILURE (HCC): ICD-10-CM

## 2023-06-19 DIAGNOSIS — I50.32 CHRONIC DIASTOLIC HEART FAILURE (HCC): ICD-10-CM

## 2023-06-19 DIAGNOSIS — N18.31 STAGE 3A CHRONIC KIDNEY DISEASE (HCC): ICD-10-CM

## 2023-06-19 DIAGNOSIS — N18.31 ACUTE RENAL FAILURE SUPERIMPOSED ON STAGE 3A CHRONIC KIDNEY DISEASE, UNSPECIFIED ACUTE RENAL FAILURE TYPE (HCC): ICD-10-CM

## 2023-06-19 DIAGNOSIS — I50.32 CHRONIC HEART FAILURE WITH PRESERVED EJECTION FRACTION (HCC): ICD-10-CM

## 2023-06-19 DIAGNOSIS — N17.9 ACUTE RENAL FAILURE SUPERIMPOSED ON STAGE 3A CHRONIC KIDNEY DISEASE, UNSPECIFIED ACUTE RENAL FAILURE TYPE (HCC): ICD-10-CM

## 2023-06-19 LAB
ALBUMIN SERPL BCP-MCNC: 2.9 G/DL (ref 3.5–5)
ANION GAP SERPL CALCULATED.3IONS-SCNC: 5 MMOL/L (ref 4–13)
BUN SERPL-MCNC: 30 MG/DL (ref 5–25)
CALCIUM ALBUM COR SERPL-MCNC: 9.2 MG/DL (ref 8.3–10.1)
CALCIUM SERPL-MCNC: 8.3 MG/DL (ref 8.3–10.1)
CHLORIDE SERPL-SCNC: 109 MMOL/L (ref 96–108)
CO2 SERPL-SCNC: 23 MMOL/L (ref 21–32)
CREAT SERPL-MCNC: 1.94 MG/DL (ref 0.6–1.3)
ERYTHROCYTE [DISTWIDTH] IN BLOOD BY AUTOMATED COUNT: 14.9 % (ref 11.6–15.1)
GFR SERPL CREATININE-BSD FRML MDRD: 37 ML/MIN/1.73SQ M
GLUCOSE SERPL-MCNC: 158 MG/DL (ref 65–140)
HCT VFR BLD AUTO: 31.7 % (ref 36.5–49.3)
HGB BLD-MCNC: 9.4 G/DL (ref 12–17)
MCH RBC QN AUTO: 25.5 PG (ref 26.8–34.3)
MCHC RBC AUTO-ENTMCNC: 29.7 G/DL (ref 31.4–37.4)
MCV RBC AUTO: 86 FL (ref 82–98)
PHOSPHATE SERPL-MCNC: 3.4 MG/DL (ref 2.7–4.5)
PLATELET # BLD AUTO: 278 THOUSANDS/UL (ref 149–390)
PMV BLD AUTO: 10 FL (ref 8.9–12.7)
POTASSIUM SERPL-SCNC: 4.2 MMOL/L (ref 3.5–5.3)
RBC # BLD AUTO: 3.68 MILLION/UL (ref 3.88–5.62)
SODIUM SERPL-SCNC: 137 MMOL/L (ref 135–147)
WBC # BLD AUTO: 6.34 THOUSAND/UL (ref 4.31–10.16)

## 2023-06-19 PROCEDURE — 36415 COLL VENOUS BLD VENIPUNCTURE: CPT

## 2023-06-19 PROCEDURE — 85027 COMPLETE CBC AUTOMATED: CPT

## 2023-06-19 PROCEDURE — 80069 RENAL FUNCTION PANEL: CPT

## 2023-06-19 PROCEDURE — 83970 ASSAY OF PARATHORMONE: CPT

## 2023-06-19 NOTE — TELEPHONE ENCOUNTER
Cardiomems #'s are up   Goal is 15 and he has been 22,25,26  He has been elevated for 14 days  He has taken dose of Metolazone 2 5 mg twice  No results  Spoke with him today and he said his wt is up 1 lb since 6/15/23  He is to get a bmp today  Taking Bumex 3 mg qd              Potassium 40 meq tid    Do you want to see lab results first before increasing diuretic?     Please advise

## 2023-06-20 LAB — PTH-INTACT SERPL-MCNC: 81 PG/ML (ref 12–88)

## 2023-06-21 ENCOUNTER — TELEPHONE (OUTPATIENT)
Dept: CARDIOLOGY CLINIC | Facility: CLINIC | Age: 58
End: 2023-06-21

## 2023-06-21 NOTE — TELEPHONE ENCOUNTER
Spoke with pt and his wt is up 4 lbs( 336 lb)  He feels more sob with exertion, led edema  PAD last 3 days 26, 25,25  Goal is 15  Had metolazone 2 5 mg over the weekend  6/19/23  Cr- 1 94(2 23 on 4/18/23), bun 30(35)K- 4 2(3 90), Na 137(132), Hbg 9 4(11 3)  Taking bumex 3 mg bid              Potassium 80 meq tid               Metolazone 2 5 mg prn for wt gain  Do you want him to take the metolazone?     Please advise

## 2023-06-21 NOTE — TELEPHONE ENCOUNTER
No, he is very good with following the fluid  and sodium restriction  With the urination, it was about the same as when he was on Bumex 3 mg qd      Thank you

## 2023-06-23 ENCOUNTER — TELEPHONE (OUTPATIENT)
Dept: CARDIOLOGY CLINIC | Facility: CLINIC | Age: 58
End: 2023-06-23

## 2023-06-23 NOTE — TELEPHONE ENCOUNTER
Spoke with pt and he states he feels a little better  He lost a lb  His PAD today is back to normal range at 18  Goal  Is 15  Will call him on Monday for update

## 2023-06-25 PROBLEM — J45.50 SEVERE PERSISTENT ASTHMA WITHOUT COMPLICATION: Status: RESOLVED | Noted: 2023-05-01 | Resolved: 2023-06-25

## 2023-06-25 PROBLEM — J45.51 SEVERE PERSISTENT ASTHMA WITH ACUTE EXACERBATION: Status: RESOLVED | Noted: 2021-10-11 | Resolved: 2023-06-25

## 2023-06-25 PROBLEM — I50.9 ACUTE ON CHRONIC HEART FAILURE (HCC): Status: RESOLVED | Noted: 2023-04-10 | Resolved: 2023-06-25

## 2023-06-26 ENCOUNTER — TELEPHONE (OUTPATIENT)
Dept: CARDIOLOGY CLINIC | Facility: CLINIC | Age: 58
End: 2023-06-26

## 2023-06-26 NOTE — TELEPHONE ENCOUNTER
Spoke with pt  Wt is unchanged  Other then having a stomach bug and running to the bathroom , he feels ok  PAD last 2 days 19, 26  Goal of 15  He was 18 on Friday  Has been running high prior to that and took metolazone  On 6/22/23  I will continue to follow his PAD  Since he is having diarrhea, do you want to wait to see were his Pad #'s fall? He will call back to set up f/u appt  6/19/23 Cr 1 94( 2 23 on 4/18/23), k-4 2(3 9), Bun 30(35), hbg 9 4( 11 3)    Taking Bumex 3 mg bid                Potassium 40 meq tid               Metolazone 2 5 mg prn fo wt gain      Please advise

## 2023-07-03 ENCOUNTER — CLINICAL SUPPORT (OUTPATIENT)
Dept: CARDIOLOGY CLINIC | Facility: CLINIC | Age: 58
End: 2023-07-03
Payer: COMMERCIAL

## 2023-07-03 ENCOUNTER — TELEPHONE (OUTPATIENT)
Dept: CARDIOLOGY CLINIC | Facility: CLINIC | Age: 58
End: 2023-07-03

## 2023-07-03 DIAGNOSIS — I50.32 CHRONIC DIASTOLIC HEART FAILURE (HCC): Chronic | ICD-10-CM

## 2023-07-03 PROCEDURE — 93264 REM MNTR WRLS P-ART PRS SNR: CPT | Performed by: INTERNAL MEDICINE

## 2023-07-03 NOTE — TELEPHONE ENCOUNTER
Spoke with pt, he said he is feeling good. However cardiomems is elevated. Goal of 15. He said he lost couple of lbs. Did not weigh self today yet. Denies any CHF symptoms. 7/3/23  - PAD- 25 with HR 93  7/2   PAd-         26  Hr 111  7/1/- skipped  6/30-  PAD-20 HR 83  6/29    PAD- 19    6/28    PAD - 28  6/27-   PAD- normal at 17    6/19/23  CR-1.94(2.23 on 4/18), K- 4.2(3.9, bun-30(35),  GFR 37,  HBG- 9.4(44.3). Taking Bumex 3 mg bid              Potassium 40 meq tid              Metolazone 2.5 mg prn-  Has not taken since 6/27/23. Should he go ahead and take metolazone? With extra potassium.     Please advise

## 2023-07-03 NOTE — TELEPHONE ENCOUNTER
Yes, take metolazone 2.5 with extra potassium  today then increase bumex to 4mg BID and see how he does since he has been requiring the metolazone.  Thanks

## 2023-07-05 ENCOUNTER — DOCUMENTATION (OUTPATIENT)
Dept: PULMONOLOGY | Facility: CLINIC | Age: 58
End: 2023-07-05

## 2023-07-06 ENCOUNTER — TELEPHONE (OUTPATIENT)
Dept: CARDIOLOGY CLINIC | Facility: CLINIC | Age: 58
End: 2023-07-06

## 2023-07-06 NOTE — PROGRESS NOTES
30 days of Cardiomems data reviewed, analyzed, and interpretation completed. Patient complied with weekly data transmissions.

## 2023-07-06 NOTE — TELEPHONE ENCOUNTER
Spoke with pt. Wt is stable at 324 lbs. No problems, just his usual winded with stairs. Took metolazone 2.5 mg on Monday and started the Bumex 4 mg bid. Cardiomems goal 15 and 7/4- 24, 7/5/- 18, 7/6- 23. Can just continue to monitor. He states he feels good.       Thank you

## 2023-07-11 ENCOUNTER — TELEPHONE (OUTPATIENT)
Dept: CARDIOLOGY CLINIC | Facility: CLINIC | Age: 58
End: 2023-07-11

## 2023-07-11 NOTE — TELEPHONE ENCOUNTER
Cardiomems PAD goal is 15. Last 3 days 19,20,23. Called pt to see how he was doing, no answer, LMOM for him to call back.

## 2023-07-13 NOTE — TELEPHONE ENCOUNTER
Spoke with pt, [place] PAD today is back to normal. He states he feels great. Wt is stable and no CHF symptoms. Will call pt if tomorrow if PAD is abnormal. Otherwise advised I will not call if normal. And he should call if needs anything. Verbally understood.

## 2023-07-14 NOTE — TELEPHONE ENCOUNTER
Cardiomems goal is 15, 7/12 it was 18 and today it is 20. He has been up more then in range for the past for the last 2 weeks. Has taken Metolazone 2.5 on 7/3 and increased that day to bumex 4 mg bid. He feels fine wt stable and no symptoms of CHF. I will set him up with appt. Has not been seen for a while. Should he take metolazone today?     Please advise

## 2023-07-17 ENCOUNTER — TELEPHONE (OUTPATIENT)
Dept: CARDIOLOGY CLINIC | Facility: CLINIC | Age: 58
End: 2023-07-17

## 2023-07-17 NOTE — TELEPHONE ENCOUNTER
Talked with pt. He was more winded over the weekend. Did take the metolazone 2.5 mg over the weekend as instructed. Denies any other CHF symptoms. He called and is getting a new pillow because he had trouble with the readings over the weekend. PAD Goal is 15 and  Sat- 19, Sun- no reading, Today- 21. Gave him appt with Noelle Gold PA-C tomorrow afternoon. He has not been seen in a while. Did you want a BMP before his appt?     Please advise

## 2023-07-18 ENCOUNTER — OFFICE VISIT (OUTPATIENT)
Dept: CARDIOLOGY CLINIC | Facility: CLINIC | Age: 58
End: 2023-07-18
Payer: COMMERCIAL

## 2023-07-18 VITALS
OXYGEN SATURATION: 94 % | SYSTOLIC BLOOD PRESSURE: 108 MMHG | BODY MASS INDEX: 46.04 KG/M2 | DIASTOLIC BLOOD PRESSURE: 58 MMHG | HEART RATE: 97 BPM | WEIGHT: 315 LBS

## 2023-07-18 DIAGNOSIS — N18.32 STAGE 3B CHRONIC KIDNEY DISEASE (HCC): ICD-10-CM

## 2023-07-18 DIAGNOSIS — I48.0 PAROXYSMAL ATRIAL FIBRILLATION (HCC): Chronic | ICD-10-CM

## 2023-07-18 DIAGNOSIS — I50.33 ACUTE ON CHRONIC HEART FAILURE WITH PRESERVED EJECTION FRACTION (HCC): Primary | ICD-10-CM

## 2023-07-18 DIAGNOSIS — T50.2X5A DIURETIC-INDUCED HYPOKALEMIA: ICD-10-CM

## 2023-07-18 DIAGNOSIS — E87.6 DIURETIC-INDUCED HYPOKALEMIA: ICD-10-CM

## 2023-07-18 PROCEDURE — 99214 OFFICE O/P EST MOD 30 MIN: CPT | Performed by: PHYSICIAN ASSISTANT

## 2023-07-18 RX ORDER — BUMETANIDE 1 MG/1
4 TABLET ORAL 2 TIMES DAILY
Qty: 240 TABLET | Refills: 5 | Status: SHIPPED | OUTPATIENT
Start: 2023-07-18 | End: 2023-07-28

## 2023-07-18 RX ORDER — POTASSIUM CHLORIDE 20 MEQ/1
40 TABLET, EXTENDED RELEASE ORAL 3 TIMES DAILY
Qty: 180 TABLET | Refills: 5 | Status: ON HOLD | OUTPATIENT
Start: 2023-07-18 | End: 2023-07-28 | Stop reason: SDUPTHER

## 2023-07-18 RX ORDER — METOLAZONE 2.5 MG/1
TABLET ORAL
Qty: 90 TABLET | Refills: 1 | Status: SHIPPED | OUTPATIENT
Start: 2023-07-18

## 2023-07-18 NOTE — PATIENT INSTRUCTIONS
Continue current Bumex dose. Tomorrow, take metolazone 5 mg (2 tablets) 20-30 minutes before Bumex. Increase potassium tomorrow to 40 mEq x3 tomorrow. Complete blood work early next week. Please weigh yourself every day (after emptying your bladder) and keep a detailed log of weights. Contact the Heart Failure program at [phone] if you gain 3+ lbs overnight or 5+ lbs in 5-7 days. Limit daily sodium/salt intake to 2000 mg daily to prevent fluid retention. Avoid canned foods, fast food/Chinese food, and processed meats (hot dogs, lunch meat, and sausage etc.). Caution with condiments. Limit fluid intake to 2000 mL or 2 liters (about 60-65 ounces) daily. Avoid electrolyte replacement drinks (such as Gatorade, Pedialyte, Propel, Liquid IV, etc.). Bring complete list of medications and log of daily weights to your follow-up appointment.

## 2023-07-18 NOTE — PROGRESS NOTES
Advanced Heart Failure / Pulmonary Hypertension Outpatient Progress Note    Joann Hale 66 y.o. male   MRN: [id number]  Encounter: [id number]    Assessment:  Patient Active Problem List    Diagnosis Date Noted   • Diabetic polyneuropathy associated with type 2 diabetes mellitus ([address]) 12/20/2022   • Stage 3a chronic kidney disease ([address]) 09/16/2022   • Paroxysmal atrial fibrillation ([address]) 03/01/2022   • Chronic diastolic heart failure ([address]) 11/12/2021   • Severe persistent asthma 10/11/2021   • Hyponatremia 07/31/2021   • Cardiomyopathy ([address]) 07/19/2021   • HNP (herniated nucleus pulposus), lumbar 02/23/2021   • Foraminal stenosis of lumbar region 02/23/2021   • VICKY (obstructive sleep apnea)    • Mixed hyperlipidemia 04/18/2019   • Primary osteoarthritis of both knees 06/14/2018   • Irritable bowel syndrome with diarrhea 01/30/2018   • Primary hypertension 01/30/2018   • Type 2 diabetes mellitus, without long-term current use of insulin ([address]) 01/30/2018   • Vitamin B12 deficiency 03/08/2016   • Vitamin D deficiency 03/08/2016   • Morbid obesity due to excess calories ([address]) 02/23/2016   • Primary osteoarthritis of right knee 10/15/2013   • Severe persistent asthma without complication 64/85/6252   • Loose stools 04/17/2023       Today's Plan:  • Up 22 lbs weight gain since being discharged from hospital in 04/2023 (6+ lbs gain overnight on home scale) with worsening TELLES, bendopnea, bilateral LE swelling, and fatigue.   o Suspect some of this gain is from caloric intake (as patient has had acceptable PAd readings on his CardioMEMS since 04/2023). o However, volume is up on exam today. Advised to continue current Bumex dosing and take metolazone 5 mg x1 tomorrow AM with additional potassium. • Will have office RN contact patient later this week for clinical update. • Check BMP and BNP early next week. • Refills sent to pharmacy as requested.      Plan:  Acute on chronic HFpEF; LVEF 55%; LVIDd 6.0 cm; NYHA III; ACC/AHA Stage C   Etiology: Afib, HTN, obesity phenotype. TTE 11/21/2021: LVEF 55%. LVIDd 6.0 cm. Grade 1 DD. Normal RV. Trace TR.     LHC 09/06/2022: no obstructive CAD. Weight of 323 lbs on 04/18 (day of discharge). Today, weighs 349 lbs (341 lbs today on home scale). Most recent BMP from 06/19/2023: sodium 137; potassium 4.2; BUN 30; creatinine 1.94; eGFR 37. Pharmacotherapies / Neurohormonal Blockade:  --Beta Blocker: metoprolol succinate 50 mg daily. --ARNi / ACEi / ARB: No.  --Aldosterone Antagonist: No.  --SGLT2 Inhibitor: empagliflozin 10 mg daily. --Diuretic: Bumex 4 mg BID with potassium 40 mEq BID with PRN metolazone 2.5 mg. Advanced Therapies:   --CardioMEMS: implanted 03/09/2022. Goal PAd of 15. Atrial fibrillation, parosxysmal   [id number] = 3 (HF, HTN, DM). Anticoagulation on Eliquis. Rate control: BB as above. Rhythm control: No.    Hypertension   BP of 108/58 mmHg in office today. Continues on medications as above. Chronic kidney disease, stage IIIb   Baseline creatinine of 1.5-2.0. Most recent BMP from 06/19/2023: sodium 137; potassium 4.2; BUN 30; creatinine 1.94; eGFR 37. Asthma, severe persistent: follows with Dr. Cayla Saleh as outpatient. Obstructive sleep apnea  Diabetes mellitus, type II  History of gastric bypass (Samuel-en-Y) surgery   History of tobacco abuse    HPI:   Cezar Kumar is a 43-year-old man with a PMH as above who presents to the office for an acute visit. Follows with Dr. Yue Victoria. CardioMEMS PAd readings persistently elevated and contacted on 04/03 by cardiology for clinical update. Instructed to take additional 3 mg Bumex (and 40 mEq potassium). Patient contacted office on 04/07 with reported 5 lbs weight gain in 48 hours with associated LE swelling and TELLES. Instructed to increase Bumex to 6 mg BID for 2 days and cut fluids down to 1.5 L daily.      On 04/10, noted to be up another 2 lbs on home scale despite taking PRN metolazone on 04/08. Advised to complete BMP, and acute visit scheduled. 04/10/2023: Patient presents for an acute visit. Reviewed above regarding weight gain and poor urinary response to aggressive PO diuretics. He reports that soon after finishing his prednisone taper on 04/05 that he began to gain weight and feel more volume overloaded with abdominal bloating and bilateral LE swelling. Also with worsening SOB and TELLES. Reports feeling winded this AM when eating breakfast.  Denies recent dietary indiscretions or increase fluid intake. Reports eating larger quantities of food while on prednisone but denies increased sodium intake with this. Took 3 mg Bumex this morning and reports decreased urinary response compared to prior. PAd elevated on 07/03; advised to take metolazone 2.5 x1 and increase Bumex to 4 mg BID. Advised again to take metolazone 2.5 mg x1 on 07/14/2023 (PAd of 20). 07/18/2023: Patient presents for an acute visit. Up 22 lbs weight gain since being discharged in 04/2023 with reported 6 lbs gain overnight on home scale. Reports worsening TELLES, bendopnea, LE swelling and fatigue. Feeling winded when showering. Continues with chronic orthopnea; denies PND. Reports losing only 1-2 lbs after taking 2.5 mg metolazone. Denies recent illness or recent dietary indiscretions.      Past Medical History:   Diagnosis Date   • Acute gastritis without hemorrhage     last assessed 3/24/17   • Asthma    • Atrial fibrillation [facility])    • Bilateral leg edema 02/19/2020   • CHF (congestive heart failure) ([facility])    • Coronary artery disease    • Daytime sleepiness 07/17/2019   • Diabetes mellitus ([address])    • Dyspnea on exertion 10/11/2021   • Edema of both feet 01/23/2020   • Gastric bypass status for obesity    • Hyperlipidemia    • Hypertension    • Hypokalemia 11/14/2021   • Impaired fasting glucose     last assessed 5/10/17   • Knee sprain, bilateral 06/14/2018   • Leg cramps 06/16/2022   • Obesity    • Viral gastroenteritis     last assessed 11/4/16       Review of Systems   Constitutional: Positive for unexpected weight change. Negative for activity change, appetite change and fatigue. Eyes: Negative. Respiratory: Positive for shortness of breath. Negative for cough and chest tightness. Cardiovascular: Positive for leg swelling. Negative for chest pain and palpitations. Gastrointestinal: Positive for abdominal distention. Negative for abdominal pain, diarrhea and nausea. Endocrine: Negative. Genitourinary: Negative for decreased urine volume, difficulty urinating, dysuria and urgency. Musculoskeletal: Negative. Skin: Negative. Allergic/Immunologic: Negative. Neurological: Positive for dizziness. Negative for syncope, weakness and light-headedness. Psychiatric/Behavioral: Negative for confusion and sleep disturbance. The patient is not nervous/anxious.       Allergies   Allergen Reactions   • Azithromycin Other (See Comments)     Shaky, uneasy feeling    • Bactrim [Sulfamethoxazole-Trimethoprim] Other (See Comments)     shakiness         Current Outpatient Medications:   •  Accu-Chek FastClix Lancets MISC, Test blood sugar twice daily, Disp: 200 each, Rfl: 3  •  albuterol (PROVENTIL HFA,VENTOLIN HFA) 90 mcg/act inhaler, INHALE 2 PUFFS EVERY 4 (FOUR) HOURS AS NEEDED FOR WHEEZING OR SHORTNESS OF BREATH, Disp: 18 g, Rfl: 3  •  apixaban (Eliquis) 5 mg, Take 1 tablet (5 mg total) by mouth 2 (two) times a day, Disp: 60 tablet, Rfl: 5  •  atorvastatin (LIPITOR) 10 mg tablet, TAKE 1 TABLET BY MOUTH EVERY DAY, Disp: 90 tablet, Rfl: 2  •  benzonatate (TESSALON PERLES) 100 mg capsule, Take 1 capsule (100 mg total) by mouth 3 (three) times a day as needed (for cough), Disp: 30 capsule, Rfl: 0  •  Blood Glucose Monitoring Suppl (Accu-Chek Guide) w/Device KIT, Use 2 (two) times a day Test blood sugar twice daily, Disp: 1 kit, Rfl: 0  •  budesonide-formoterol (Symbicort) 160-4.5 mcg/act inhaler, Inhale 2 puffs 2 (two) times a day Rinse mouth after use., Disp: 30.6 g, Rfl: 2  •  bumetanide (BUMEX) 1 mg tablet, Take 4 tablets (4 mg total) by mouth 2 (two) times a day, Disp: 240 tablet, Rfl: 5  •  Empagliflozin (JARDIANCE) 10 MG TABS tablet, Take 1 tablet (10 mg total) by mouth daily, Disp: 30 tablet, Rfl: 11  •  Fasenra Pen 30 MG/ML SOAJ, , Disp: , Rfl:   •  levalbuterol (Xopenex) 1.25 mg/3 mL nebulizer solution, Take 3 mL (1.25 mg total) by nebulization as needed for wheezing or shortness of breath, Disp: , Rfl:   •  metolazone (ZAROXOLYN) 2.5 mg tablet, Take 1 tablet by mouth as directed by provider. Take 20-30 minutes before Bumex dose and with additional potassium. , Disp: 90 tablet, Rfl: 1  •  metoprolol succinate (TOPROL-XL) 50 mg 24 hr tablet, Take 1 tablet (50 mg total) by mouth daily, Disp: 90 tablet, Rfl: 3  •  montelukast (SINGULAIR) 10 mg tablet, Take 1 tablet (10 mg total) by mouth daily at bedtime, Disp: 90 tablet, Rfl: 1  •  pantoprazole (PROTONIX) 40 mg tablet, Take 1 tablet (40 mg total) by mouth daily, Disp: 21 tablet, Rfl: 0  •  potassium chloride (K-DUR,KLOR-CON) 20 mEq tablet, Take 2 tablets (40 mEq total) by mouth 3 (three) times a day (Patient taking differently: Take 40 mEq by mouth 2 (two) times a day), Disp: 180 tablet, Rfl: 5  •  pregabalin (LYRICA) 50 mg capsule, Take 1 caps.  at bedtime, Disp: 30 capsule, Rfl: 5  •  sitaGLIPtin (Januvia) 100 mg tablet, Take 1/2 tab. daily, Disp: 30 tablet, Rfl: 5  •  Spiriva Respimat 1.25 MCG/ACT AERS inhaler, INHALE 2 PUFFS BY MOUTH EVERY DAY, Disp: 4 g, Rfl: 5  •  EPINEPHrine (EPIPEN) 0.3 mg/0.3 mL SOAJ, Inject 0.3 mL (0.3 mg total) into a muscle once for 1 dose, Disp: 0.6 mL, Rfl: 0    Social History     Socioeconomic History   • Marital status: /Civil Union     Spouse name: Not on file   • Number of children: Not on file   • Years of education: Not on file   • Highest education level: Not on file   Occupational History   • Not on file   Tobacco Use   • Smoking status: Former     Types: Pipe, Cigars     Start date:      Quit date: 2021     Years since quittin.5   • Smokeless tobacco: Never   • Tobacco comments:     cigar once a day   Vaping Use   • Vaping Use: Never used   Substance and Sexual Activity   • Alcohol use: Yes     Alcohol/week: 2.0 standard drinks of alcohol     Types: 2 Shots of liquor per week     Comment: social   • Drug use: No   • Sexual activity: Yes     Partners: Female     Birth control/protection: None   Other Topics Concern   • Not on file   Social History Narrative   • Not on file     Social Determinants of Health     Financial Resource Strain: Not on file   Food Insecurity: No Food Insecurity (2023)    Hunger Vital Sign    • Worried About Running Out of Food in the Last Year: Never true    • Ran Out of Food in the Last Year: Never true   Transportation Needs: No Transportation Needs (2023)    PRAPARE - Transportation    • Lack of Transportation (Medical): No    • Lack of Transportation (Non-Medical): No   Physical Activity: Not on file   Stress: Not on file   Social Connections: Not on file   Intimate Partner Violence: Not on file   Housing Stability: Low Risk  (2023)    Housing Stability Vital Sign    • Unable to Pay for Housing in the Last Year: No    • Number of Places Lived in the Last Year: 1    • Unstable Housing in the Last Year: No     Family History   Problem Relation Age of Onset   • Stroke Mother    • Hypertension Father    • Cancer Father    • COPD Father        Vitals:   Blood pressure 108/58, pulse 97, weight (!) 158 kg (349 lb), SpO2 94 %. Body mass index is 46.04 kg/m².     Wt Readings from Last 10 Encounters:   23 (!) 158 kg (349 lb)   23 (!) 149 kg (327 lb 6.4 oz)   23 (!) 147 kg (323 lb 13.7 oz)   04/10/23 (!) 154 kg (339 lb 11.2 oz)   23 (!) 151 kg (332 lb 0.2 oz)   03/15/23 (!) 151 kg (333 lb)   22 (!) 148 kg (326 lb)   22 (!) 147 kg (324 lb) 22 (!) 147 kg (324 lb 9.6 oz)   22 (!) 146 kg (321 lb 11.2 oz)     Vitals:    23 1524   BP: 108/58   BP Location: Right arm   Patient Position: Sitting   Cuff Size: Large   Pulse: 97   SpO2: 94%   Weight: (!) 158 kg (349 lb)       Physical Exam  Vitals reviewed. Constitutional:       General: He is awake. He is not in acute distress. Appearance: Normal appearance. He is well-developed and overweight. He is not ill-appearing, toxic-appearing or diaphoretic. HENT:      Head: Normocephalic. Nose: Nose normal.      Mouth/Throat:      Mouth: Mucous membranes are moist.   Eyes:      General: No scleral icterus. Conjunctiva/sclera: Conjunctivae normal.   Neck:      Vascular: JVD present. Trachea: No tracheal deviation. Cardiovascular:      Rate and Rhythm: Normal rate and regular rhythm. No extrasystoles are present. Heart sounds: No murmur heard. Pulmonary:      Effort: Pulmonary effort is normal. No tachypnea, bradypnea or respiratory distress. Breath sounds: Normal air entry. Decreased breath sounds (distant) present. No wheezing or rhonchi. Abdominal:      General: Bowel sounds are normal. There is distension. Palpations: Abdomen is soft. Tenderness: There is no abdominal tenderness. Musculoskeletal:      Cervical back: Neck supple. Right lower le+ Pitting Edema (hitting mid calf) present. Left lower le+ Pitting Edema (hitting mid calf) present. Skin:     General: Skin is warm and dry. Coloration: Skin is not jaundiced or pale. Neurological:      General: No focal deficit present. Mental Status: He is alert and oriented to person, place, and time. Psychiatric:         Mood and Affect: Mood and affect normal.         Speech: Speech normal.         Behavior: Behavior normal. Behavior is cooperative. Thought Content:  Thought content normal.       Labs & Results:  Lab Results   Component Value Date    WBC 6.34 06/19/2023    HGB 9.4 (L) 06/19/2023    HCT 31.7 (L) 06/19/2023    MCV 86 06/19/2023     06/19/2023     Lab Results   Component Value Date    SODIUM 137 06/19/2023    K 4.2 06/19/2023     (H) 06/19/2023    CO2 23 06/19/2023    BUN 30 (H) 06/19/2023    CREATININE 1.94 (H) 06/19/2023    GLUC 158 (H) 06/19/2023    CALCIUM 8.3 06/19/2023     No results found for: "INR", "PROTIME"     Lab Results   Component Value Date    NTBNP 697 (H) 04/10/2023      No results found for: "BNP"     Haylee Tan PA-C

## 2023-07-23 ENCOUNTER — APPOINTMENT (EMERGENCY)
Dept: RADIOLOGY | Facility: HOSPITAL | Age: 58
DRG: 291 | End: 2023-07-23
Payer: COMMERCIAL

## 2023-07-23 ENCOUNTER — HOSPITAL ENCOUNTER (INPATIENT)
Facility: HOSPITAL | Age: 58
LOS: 5 days | Discharge: HOME | DRG: 291 | End: 2023-07-28
Attending: EMERGENCY MEDICINE | Admitting: FAMILY MEDICINE
Payer: COMMERCIAL

## 2023-07-23 DIAGNOSIS — E87.6 DIURETIC-INDUCED HYPOKALEMIA: ICD-10-CM

## 2023-07-23 DIAGNOSIS — I50.9 ACUTE ON CHRONIC HEART FAILURE (HCC): ICD-10-CM

## 2023-07-23 DIAGNOSIS — I50.33 ACUTE ON CHRONIC DIASTOLIC HEART FAILURE (HCC): Primary | ICD-10-CM

## 2023-07-23 DIAGNOSIS — T50.2X5A DIURETIC-INDUCED HYPOKALEMIA: ICD-10-CM

## 2023-07-23 DIAGNOSIS — R07.9 CHEST PAIN: ICD-10-CM

## 2023-07-23 LAB
2HR DELTA HS TROPONIN: -1 NG/L
4HR DELTA HS TROPONIN: 1 NG/L
ALBUMIN SERPL BCP-MCNC: 3.2 G/DL (ref 3.5–5)
ALP SERPL-CCNC: 107 U/L (ref 46–116)
ALT SERPL W P-5'-P-CCNC: 32 U/L (ref 12–78)
ANION GAP SERPL CALCULATED.3IONS-SCNC: 7 MMOL/L
AST SERPL W P-5'-P-CCNC: 45 U/L (ref 5–45)
BASOPHILS # BLD AUTO: 0.02 THOUSANDS/ÂΜL (ref 0–0.1)
BASOPHILS NFR BLD AUTO: 0 % (ref 0–1)
BILIRUB SERPL-MCNC: 0.37 MG/DL (ref 0.2–1)
BNP SERPL-MCNC: 233 PG/ML (ref 0–100)
BUN SERPL-MCNC: 18 MG/DL (ref 5–25)
CALCIUM ALBUM COR SERPL-MCNC: 8.8 MG/DL (ref 8.3–10.1)
CALCIUM SERPL-MCNC: 8.2 MG/DL (ref 8.3–10.1)
CARDIAC TROPONIN I PNL SERPL HS: 16 NG/L
CARDIAC TROPONIN I PNL SERPL HS: 17 NG/L
CARDIAC TROPONIN I PNL SERPL HS: 18 NG/L
CHLORIDE SERPL-SCNC: 107 MMOL/L (ref 96–108)
CO2 SERPL-SCNC: 24 MMOL/L (ref 21–32)
CREAT SERPL-MCNC: 1.4 MG/DL (ref 0.6–1.3)
EOSINOPHIL # BLD AUTO: 0 THOUSAND/ÂΜL (ref 0–0.61)
EOSINOPHIL NFR BLD AUTO: 0 % (ref 0–6)
ERYTHROCYTE [DISTWIDTH] IN BLOOD BY AUTOMATED COUNT: 14.6 % (ref 11.6–15.1)
GFR SERPL CREATININE-BSD FRML MDRD: 55 ML/MIN/1.73SQ M
GLUCOSE SERPL-MCNC: 165 MG/DL (ref 65–140)
GLUCOSE SERPL-MCNC: 216 MG/DL (ref 65–140)
HCT VFR BLD AUTO: 34.1 % (ref 36.5–49.3)
HGB BLD-MCNC: 10.3 G/DL (ref 12–17)
IMM GRANULOCYTES # BLD AUTO: 0.05 THOUSAND/UL (ref 0–0.2)
IMM GRANULOCYTES NFR BLD AUTO: 1 % (ref 0–2)
LYMPHOCYTES # BLD AUTO: 0.84 THOUSANDS/ÂΜL (ref 0.6–4.47)
LYMPHOCYTES NFR BLD AUTO: 13 % (ref 14–44)
MCH RBC QN AUTO: 25.2 PG (ref 26.8–34.3)
MCHC RBC AUTO-ENTMCNC: 30.2 G/DL (ref 31.4–37.4)
MCV RBC AUTO: 84 FL (ref 82–98)
MONOCYTES # BLD AUTO: 0.55 THOUSAND/ÂΜL (ref 0.17–1.22)
MONOCYTES NFR BLD AUTO: 9 % (ref 4–12)
NEUTROPHILS # BLD AUTO: 4.83 THOUSANDS/ÂΜL (ref 1.85–7.62)
NEUTS SEG NFR BLD AUTO: 77 % (ref 43–75)
NRBC BLD AUTO-RTO: 0 /100 WBCS
PLATELET # BLD AUTO: 248 THOUSANDS/UL (ref 149–390)
PMV BLD AUTO: 10.7 FL (ref 8.9–12.7)
POTASSIUM SERPL-SCNC: 4.2 MMOL/L (ref 3.5–5.3)
PROT SERPL-MCNC: 7.1 G/DL (ref 6.4–8.4)
RBC # BLD AUTO: 4.08 MILLION/UL (ref 3.88–5.62)
SODIUM SERPL-SCNC: 138 MMOL/L (ref 135–147)
WBC # BLD AUTO: 6.29 THOUSAND/UL (ref 4.31–10.16)

## 2023-07-23 PROCEDURE — 80053 COMPREHEN METABOLIC PANEL: CPT

## 2023-07-23 PROCEDURE — 96365 THER/PROPH/DIAG IV INF INIT: CPT

## 2023-07-23 PROCEDURE — 82948 REAGENT STRIP/BLOOD GLUCOSE: CPT

## 2023-07-23 PROCEDURE — 36415 COLL VENOUS BLD VENIPUNCTURE: CPT

## 2023-07-23 PROCEDURE — 84484 ASSAY OF TROPONIN QUANT: CPT

## 2023-07-23 PROCEDURE — 71045 X-RAY EXAM CHEST 1 VIEW: CPT

## 2023-07-23 PROCEDURE — 99285 EMERGENCY DEPT VISIT HI MDM: CPT | Performed by: EMERGENCY MEDICINE

## 2023-07-23 PROCEDURE — 99223 1ST HOSP IP/OBS HIGH 75: CPT | Performed by: FAMILY MEDICINE

## 2023-07-23 PROCEDURE — 83880 ASSAY OF NATRIURETIC PEPTIDE: CPT

## 2023-07-23 PROCEDURE — 85025 COMPLETE CBC W/AUTO DIFF WBC: CPT

## 2023-07-23 PROCEDURE — 99285 EMERGENCY DEPT VISIT HI MDM: CPT

## 2023-07-23 PROCEDURE — 93005 ELECTROCARDIOGRAM TRACING: CPT

## 2023-07-23 RX ORDER — PANTOPRAZOLE SODIUM 40 MG/1
40 TABLET, DELAYED RELEASE ORAL DAILY
Status: DISCONTINUED | OUTPATIENT
Start: 2023-07-24 | End: 2023-07-28 | Stop reason: HOSPADM

## 2023-07-23 RX ORDER — PREGABALIN 50 MG/1
50 CAPSULE ORAL
Status: DISCONTINUED | OUTPATIENT
Start: 2023-07-23 | End: 2023-07-28 | Stop reason: HOSPADM

## 2023-07-23 RX ORDER — BUDESONIDE AND FORMOTEROL FUMARATE DIHYDRATE 160; 4.5 UG/1; UG/1
2 AEROSOL RESPIRATORY (INHALATION) 2 TIMES DAILY
Status: DISCONTINUED | OUTPATIENT
Start: 2023-07-23 | End: 2023-07-28 | Stop reason: HOSPADM

## 2023-07-23 RX ORDER — ATORVASTATIN CALCIUM 10 MG/1
10 TABLET, FILM COATED ORAL DAILY
Status: DISCONTINUED | OUTPATIENT
Start: 2023-07-24 | End: 2023-07-28 | Stop reason: HOSPADM

## 2023-07-23 RX ORDER — INSULIN LISPRO 100 [IU]/ML
1-5 INJECTION, SOLUTION INTRAVENOUS; SUBCUTANEOUS
Status: DISCONTINUED | OUTPATIENT
Start: 2023-07-23 | End: 2023-07-28 | Stop reason: HOSPADM

## 2023-07-23 RX ORDER — ALBUTEROL SULFATE 2.5 MG/3ML
2.5 SOLUTION RESPIRATORY (INHALATION) EVERY 4 HOURS PRN
Status: DISCONTINUED | OUTPATIENT
Start: 2023-07-23 | End: 2023-07-28 | Stop reason: HOSPADM

## 2023-07-23 RX ORDER — INSULIN LISPRO 100 [IU]/ML
1-6 INJECTION, SOLUTION INTRAVENOUS; SUBCUTANEOUS
Status: DISCONTINUED | OUTPATIENT
Start: 2023-07-24 | End: 2023-07-28 | Stop reason: HOSPADM

## 2023-07-23 RX ORDER — MONTELUKAST SODIUM 10 MG/1
10 TABLET ORAL
Status: DISCONTINUED | OUTPATIENT
Start: 2023-07-23 | End: 2023-07-28 | Stop reason: HOSPADM

## 2023-07-23 RX ORDER — ONDANSETRON 2 MG/ML
4 INJECTION INTRAMUSCULAR; INTRAVENOUS EVERY 6 HOURS PRN
Status: DISCONTINUED | OUTPATIENT
Start: 2023-07-23 | End: 2023-07-28 | Stop reason: HOSPADM

## 2023-07-23 RX ORDER — METOPROLOL SUCCINATE 50 MG/1
50 TABLET, EXTENDED RELEASE ORAL DAILY
Status: DISCONTINUED | OUTPATIENT
Start: 2023-07-24 | End: 2023-07-28 | Stop reason: HOSPADM

## 2023-07-23 RX ORDER — ALBUTEROL SULFATE 90 UG/1
2 AEROSOL, METERED RESPIRATORY (INHALATION) EVERY 4 HOURS PRN
Status: DISCONTINUED | OUTPATIENT
Start: 2023-07-23 | End: 2023-07-28 | Stop reason: HOSPADM

## 2023-07-23 RX ORDER — LEVALBUTEROL INHALATION SOLUTION 1.25 MG/3ML
1.25 SOLUTION RESPIRATORY (INHALATION) EVERY 8 HOURS PRN
Status: DISCONTINUED | OUTPATIENT
Start: 2023-07-23 | End: 2023-07-23

## 2023-07-23 RX ORDER — ACETAMINOPHEN 325 MG/1
650 TABLET ORAL EVERY 6 HOURS PRN
Status: DISCONTINUED | OUTPATIENT
Start: 2023-07-23 | End: 2023-07-28 | Stop reason: HOSPADM

## 2023-07-23 RX ADMIN — PREGABALIN 50 MG: 50 CAPSULE ORAL at 21:56

## 2023-07-23 RX ADMIN — APIXABAN 5 MG: 5 TABLET, FILM COATED ORAL at 20:21

## 2023-07-23 RX ADMIN — BUDESONIDE AND FORMOTEROL FUMARATE DIHYDRATE 2 PUFF: 160; 4.5 AEROSOL RESPIRATORY (INHALATION) at 20:33

## 2023-07-23 RX ADMIN — FUROSEMIDE 100 MG: 10 INJECTION, SOLUTION INTRAMUSCULAR; INTRAVENOUS at 14:51

## 2023-07-23 RX ADMIN — MONTELUKAST 10 MG: 10 TABLET, FILM COATED ORAL at 21:56

## 2023-07-23 RX ADMIN — INSULIN LISPRO 1 UNITS: 100 INJECTION, SOLUTION INTRAVENOUS; SUBCUTANEOUS at 21:56

## 2023-07-23 NOTE — ASSESSMENT & PLAN NOTE
Start sliding scale    No results for input(s): "POCGLU" in the last 72 hours.     Blood Sugar Average: Last 72 hrs:  Hold home medication Januvia  Start sliding scale

## 2023-07-23 NOTE — ED PROVIDER NOTES
History  Chief Complaint   Patient presents with   • Chest Pain     C/o chest pain and SOB for about 1 week. Today c/o left arm numbness. Hx of COPD, CHF     Patient is a 53-year-old male with past medical history of diastolic heart failure, CKD stage III, diabetes, hypertension, asthma & high cholesterol. He presents for evaluation of shortness of breath. He reports symptoms have been present since earlier this week. He saw his cardiologist on 7/18 de to 22 lb weight gain since April and per chart review, was observed to be "volume up" on exam. Patient was advised to continue Bumex 1 mg QID dos and take metolazone 5 mg x1 with additional potassium. Patient took medications as instructed however has continued to experience shortness of breath. Patient was not experiencing symptoms of asthma, such as wheezing, however did use albuterol inhaler without improvement in his shortness of breath. He has noticed a concurrent abdominal fullness, dry cough, and bilateral lower extremity swelling. He also reports 1 episode of 6 /10 retrosternal chest pain today that lasted for approximately 10 minutes. Pain was accompanied with heaviness in left arm. Patient denies patient into jaw or neck. Denies diaphoresis or nausea. Pain resolved with rest.  Patient denies fever, chills, abdominal pain. Prior to Admission Medications   Prescriptions Last Dose Informant Patient Reported? Taking?    Accu-Chek FastClix Lancets MISC  Self No No   Sig: Test blood sugar twice daily   Blood Glucose Monitoring Suppl (Accu-Chek Guide) w/Device KIT  Self No No   Sig: Use 2 (two) times a day Test blood sugar twice daily   EPINEPHrine (EPIPEN) 0.3 mg/0.3 mL SOAJ  Self No No   Sig: Inject 0.3 mL (0.3 mg total) into a muscle once for 1 dose   Empagliflozin (JARDIANCE) 10 MG TABS tablet   No No   Sig: Take 1 tablet (10 mg total) by mouth daily   Fasenra Pen 30 MG/ML SOAJ  Self Yes No   Spiriva Respimat 1.25 MCG/ACT AERS inhaler  Self No No   Sig: INHALE 2 PUFFS BY MOUTH EVERY DAY   albuterol (PROVENTIL HFA,VENTOLIN HFA) 90 mcg/act inhaler  Self No No   Sig: INHALE 2 PUFFS EVERY 4 (FOUR) HOURS AS NEEDED FOR WHEEZING OR SHORTNESS OF BREATH   apixaban (Eliquis) 5 mg  Self No No   Sig: Take 1 tablet (5 mg total) by mouth 2 (two) times a day   atorvastatin (LIPITOR) 10 mg tablet  Self No No   Sig: TAKE 1 TABLET BY MOUTH EVERY DAY   benzonatate (TESSALON PERLES) 100 mg capsule  Self No No   Sig: Take 1 capsule (100 mg total) by mouth 3 (three) times a day as needed (for cough)   budesonide-formoterol (Symbicort) 160-4.5 mcg/act inhaler  Self No No   Sig: Inhale 2 puffs 2 (two) times a day Rinse mouth after use. bumetanide (BUMEX) 1 mg tablet   No No   Sig: Take 4 tablets (4 mg total) by mouth 2 (two) times a day   levalbuterol (Xopenex) 1.25 mg/3 mL nebulizer solution  Self No No   Sig: Take 3 mL (1.25 mg total) by nebulization as needed for wheezing or shortness of breath   metolazone (ZAROXOLYN) 2.5 mg tablet   No No   Sig: Take 1 tablet by mouth as directed by provider. Take 20-30 minutes before Bumex dose and with additional potassium. metoprolol succinate (TOPROL-XL) 50 mg 24 hr tablet  Self No No   Sig: Take 1 tablet (50 mg total) by mouth daily   montelukast (SINGULAIR) 10 mg tablet  Self No No   Sig: Take 1 tablet (10 mg total) by mouth daily at bedtime   pantoprazole (PROTONIX) 40 mg tablet  Self No No   Sig: Take 1 tablet (40 mg total) by mouth daily   potassium chloride (K-DUR,KLOR-CON) 20 mEq tablet   No No   Sig: Take 2 tablets (40 mEq total) by mouth 3 (three) times a day   Patient taking differently: Take 40 mEq by mouth 2 (two) times a day   pregabalin (LYRICA) 50 mg capsule   No No   Sig: Take 1 caps.  at bedtime   sitaGLIPtin (Januvia) 100 mg tablet  Self No No   Sig: Take 1/2 tab. daily      Facility-Administered Medications: None       Past Medical History:   Diagnosis Date   • Acute gastritis without hemorrhage     last assessed 3/24/17   • Asthma    • Atrial fibrillation (HCC)    • Bilateral leg edema 2020   • CHF (congestive heart failure) ([facility])    • Coronary artery disease    • Daytime sleepiness 2019   • Diabetes mellitus ([address])    • Dyspnea on exertion 10/11/2021   • Edema of both feet 2020   • Gastric bypass status for obesity    • Hyperlipidemia    • Hypertension    • Hypokalemia 2021   • Impaired fasting glucose     last assessed 5/10/17   • Knee sprain, bilateral 2018   • Leg cramps 2022   • Obesity    • Viral gastroenteritis     last assessed 16       Past Surgical History:   Procedure Laterality Date   • CARDIAC CATHETERIZATION N/A 3/9/2022    Procedure: CARDIAC RHC W/ PRESSURE SENSOR;  Surgeon: Dion Carter MD;  Location: [facility]; Service: Cardiology   • CARDIAC CATHETERIZATION N/A 2022    Procedure: Cardiac catheterization;  Surgeon: Pierre Garcia DO;  [facility]; Service: Cardiology   • CARDIAC CATHETERIZATION N/A 2022    Procedure: Cardiac Coronary Angiogram;  Surgeon: Pierre Garcia DO;  [facility]: [facility]; Service: Cardiology   • CARPAL TUNNEL RELEASE      bilateral   • GASTRIC BYPASS      with han en y   • HERNIA REPAIR      ventral inscisional   • TONSILLECTOMY         Family History   Problem Relation Age of Onset   • Stroke Mother    • Hypertension Father    • Cancer Father    • COPD Father      I have reviewed and agree with the history as documented.     E-Cigarette/Vaping   • E-Cigarette Use Never User      E-Cigarette/Vaping Substances   • Nicotine No    • THC No    • CBD No    • Flavoring No    • Other No    • Unknown No      Social History     Tobacco Use   • Smoking status: Former     Types: Pipe, Cigars     Start date:      Quit date: 2021     Years since quittin.5   • Smokeless tobacco: Never   • Tobacco comments:     cigar once a day   Vaping Use   • Vaping Use: Never used   Substance Use Topics   • Alcohol use: Yes     Alcohol/week: 2.0 standard drinks of alcohol     Types: 2 Shots of liquor per week     Comment: social   • Drug use: No        Review of Systems   Constitutional: Negative for chills, diaphoresis and fever. Respiratory: Positive for cough (dry) and shortness of breath. Negative for wheezing. Cardiovascular: Positive for chest pain and leg swelling. Gastrointestinal: Positive for abdominal distention. Negative for abdominal pain, constipation, diarrhea, nausea and vomiting. Endocrine:        22 lb weight gain since April      Genitourinary: Negative for difficulty urinating. Neurological: Negative for weakness, light-headedness, numbness and headaches. Physical Exam  ED Triage Vitals   Temperature Pulse Respirations Blood Pressure SpO2   07/24/23 0929 07/23/23 1346 07/23/23 1346 07/23/23 1346 07/23/23 1346   98.4 °F (36.9 °C) 72 18 141/68 97 %      Temp Source Heart Rate Source Patient Position - Orthostatic VS BP Location FiO2 (%)   07/24/23 0929 07/23/23 1346 07/23/23 1346 07/23/23 1346 --   Oral Monitor Lying Right arm       Pain Score       07/23/23 1625       No Pain             Orthostatic Vital Signs  Vitals:    07/24/23 1914 07/24/23 2244 07/25/23 0256 07/25/23 0649   BP: 135/71 124/59 119/69 122/68   Pulse: 84 82 83 81   Patient Position - Orthostatic VS: Lying Lying Lying Sitting       Physical Exam  Constitutional:       General: He is not in acute distress. Appearance: He is well-developed. He is obese. He is not ill-appearing, toxic-appearing or diaphoretic. HENT:      Head: Normocephalic and atraumatic. Neck:      Vascular: JVD present. Cardiovascular:      Rate and Rhythm: Normal rate and regular rhythm. Heart sounds: Normal heart sounds. No systolic murmur is present. No diastolic murmur is present. No friction rub. No gallop.    Pulmonary:      Effort: Pulmonary effort is normal. No tachypnea, accessory muscle usage or respiratory distress. Breath sounds: Normal breath sounds. No stridor. No wheezing, rhonchi or rales. Abdominal:      Palpations: Abdomen is soft. Tenderness: There is abdominal tenderness (mild diffuse tenderness, patient feels "swelling" contributes to tenderness). Musculoskeletal:      Cervical back: Neck supple. Right lower leg: No tenderness. No edema. Left lower leg: No tenderness. No edema. Comments: B/L LE swelling   Skin:     General: Skin is warm and dry. Findings: Rash (4 cm plaque on abdomen, patient endorses h/o psoriasis) present. Neurological:      General: No focal deficit present. Mental Status: He is alert and oriented to person, place, and time. Psychiatric:         Mood and Affect: Mood normal. Mood is not anxious. Behavior: Behavior normal. Behavior is not agitated.          ED Medications  Medications   albuterol (PROVENTIL HFA,VENTOLIN HFA) inhaler 2 puff (has no administration in time range)   apixaban (ELIQUIS) tablet 5 mg (5 mg Oral Given 7/24/23 1733)   atorvastatin (LIPITOR) tablet 10 mg (10 mg Oral Given 7/24/23 1733)   budesonide-formoterol (SYMBICORT) 160-4.5 mcg/act inhaler 2 puff (2 puffs Inhalation Given 7/24/23 1733)   metoprolol succinate (TOPROL-XL) 24 hr tablet 50 mg (50 mg Oral Given 7/24/23 1034)   montelukast (SINGULAIR) tablet 10 mg (10 mg Oral Given 7/24/23 2100)   pantoprazole (PROTONIX) EC tablet 40 mg (40 mg Oral Given 7/24/23 1040)   pregabalin (LYRICA) capsule 50 mg (50 mg Oral Given 7/24/23 2100)   umeclidinium 62.5 mcg/actuation inhaler AEPB 1 puff (1 puff Inhalation Given 7/24/23 1041)   acetaminophen (TYLENOL) tablet 650 mg (has no administration in time range)   ondansetron (ZOFRAN) injection 4 mg (has no administration in time range)   insulin lispro (HumaLOG) 100 units/mL subcutaneous injection 1-6 Units (2 Units Subcutaneous Given 7/25/23 0608)   insulin lispro (HumaLOG) 100 units/mL subcutaneous injection 1-5 Units (2 Units Subcutaneous Given 7/24/23 2103)   Empagliflozin (JARDIANCE) tablet 10 mg (10 mg Oral Given 7/24/23 1040)   albuterol inhalation solution 2.5 mg (has no administration in time range)   potassium chloride (K-DUR,KLOR-CON) CR tablet 40 mEq (40 mEq Oral Given 7/24/23 1733)   bumetanide (BUMEX) 12.5 mg infusion 50 mL (1 mg/hr Intravenous New Bag 7/24/23 2003)       Diagnostic Studies  Results Reviewed     Procedure Component Value Units Date/Time    Basic metabolic panel [[id number]]  (Abnormal) Collected: 07/24/23 0522    Lab Status: Final result Specimen: Blood from Arm, Left Updated: 07/24/23 0556     Sodium 138 mmol/L      Potassium 3.7 mmol/L      Chloride 104 mmol/L      CO2 26 mmol/L      ANION GAP 8 mmol/L      BUN 21 mg/dL      Creatinine 1.52 mg/dL      Glucose 160 mg/dL      Calcium 7.7 mg/dL      eGFR 50 ml/min/1.73sq m     Narrative:      [place] guidelines for Chronic Kidney Disease (CKD):   •  Stage 1 with normal or high GFR (GFR > 90 mL/min/1.73 square meters)  •  Stage 2 Mild CKD (GFR = 60-89 mL/min/1.73 square meters)  •  Stage 3A Moderate CKD (GFR = 45-59 mL/min/1.73 square meters)  •  Stage 3B Moderate CKD (GFR = 30-44 mL/min/1.73 square meters)  •  Stage 4 Severe CKD (GFR = 15-29 mL/min/1.73 square meters)  •  Stage 5 End Stage CKD (GFR <15 mL/min/1.73 square meters)  Note: GFR calculation is accurate only with a steady state creatinine    CBC (With Platelets) [[id number]]  (Abnormal) Collected: 07/24/23 0522    Lab Status: Final result Specimen: Blood from Arm, Left Updated: 07/24/23 0542     WBC 7.03 Thousand/uL      RBC 3.27 Million/uL      Hemoglobin 8.2 g/dL      Hematocrit 27.3 %      MCV 84 fL      MCH 25.1 pg      MCHC 30.0 g/dL      RDW 14.6 %      Platelets 691 Thousands/uL      MPV 10.1 fL     Fingerstick Glucose (POCT) [[id number]]  (Abnormal) Collected: 07/24/23 0517    Lab Status: Final result Updated: 07/24/23 0524     POC Glucose 160 mg/dl Fingerstick Glucose (POCT) [[id number]]  (Abnormal) Collected: 07/23/23 2155    Lab Status: Final result Updated: 07/23/23 2200     POC Glucose 165 mg/dl     HS Troponin I 4hr [[id number]]  (Normal) Collected: 07/23/23 1838    Lab Status: Final result Specimen: Blood from Arm, Left Updated: 07/23/23 1917     hs TnI 4hr 18 ng/L      Delta 4hr hsTnI 1 ng/L     HS Troponin I 2hr [[id number]]  (Normal) Collected: 07/23/23 1627    Lab Status: Final result Specimen: Blood from Arm, Left Updated: 07/23/23 1704     hs TnI 2hr 16 ng/L      Delta 2hr hsTnI -1 ng/L     HS Troponin 0hr (reflex protocol) [[id number]]  (Normal) Collected: 07/23/23 1430    Lab Status: Final result Specimen: Blood from Arm, Left Updated: 07/23/23 1513     hs TnI 0hr 17 ng/L     B-Type Natriuretic Peptide(BNP) [[id number]]  (Abnormal) Collected: 07/23/23 1430    Lab Status: Final result Specimen: Blood from Arm, Left Updated: 07/23/23 1512      pg/mL     Comprehensive metabolic panel [[id number]]  (Abnormal) Collected: 07/23/23 1430    Lab Status: Final result Specimen: Blood from Arm, Left Updated: 07/23/23 1502     Sodium 138 mmol/L      Potassium 4.2 mmol/L      Chloride 107 mmol/L      CO2 24 mmol/L      ANION GAP 7 mmol/L      BUN 18 mg/dL      Creatinine 1.40 mg/dL      Glucose 216 mg/dL      Calcium 8.2 mg/dL      Corrected Calcium 8.8 mg/dL      AST 45 U/L      ALT 32 U/L      Alkaline Phosphatase 107 U/L      Total Protein 7.1 g/dL      Albumin 3.2 g/dL      Total Bilirubin 0.37 mg/dL      eGFR 55 ml/min/1.73sq m     Narrative:      [place] guidelines for Chronic Kidney Disease (CKD):   •  Stage 1 with normal or high GFR (GFR > 90 mL/min/1.73 square meters)  •  Stage 2 Mild CKD (GFR = 60-89 mL/min/1.73 square meters)  •  Stage 3A Moderate CKD (GFR = 45-59 mL/min/1.73 square meters)  •  Stage 3B Moderate CKD (GFR = 30-44 mL/min/1.73 square meters)  •  Stage 4 Severe CKD (GFR = 15-29 mL/min/1.73 square meters)  • Stage 5 End Stage CKD (GFR <15 mL/min/1.73 square meters)  Note: GFR calculation is accurate only with a steady state creatinine    CBC and differential [[id number]]  (Abnormal) Collected: 07/23/23 1430    Lab Status: Final result Specimen: Blood from Arm, Left Updated: 07/23/23 1442     WBC 6.29 Thousand/uL      RBC 4.08 Million/uL      Hemoglobin 10.3 g/dL      Hematocrit 34.1 %      MCV 84 fL      MCH 25.2 pg      MCHC 30.2 g/dL      RDW 14.6 %      MPV 10.7 fL      Platelets 405 Thousands/uL      nRBC 0 /100 WBCs      Neutrophils Relative 77 %      Immat GRANS % 1 %      Lymphocytes Relative 13 %      Monocytes Relative 9 %      Eosinophils Relative 0 %      Basophils Relative 0 %      Neutrophils Absolute 4.83 Thousands/µL      Immature Grans Absolute 0.05 Thousand/uL      Lymphocytes Absolute 0.84 Thousands/µL      Monocytes Absolute 0.55 Thousand/µL      Eosinophils Absolute 0.00 Thousand/µL      Basophils Absolute 0.02 Thousands/µL                  XR chest 1 view portable   ED Interpretation by Christina Chan DO (07/23 1740)   Mild vascular congestion, worse than previous CXR      Final Result by Jas Byrd MD (07/24 1973)      Slight increased pulmonary vascular            Workstation performed: [id number]               Procedures  ECG 12 Lead Documentation Only    Date/Time: 7/23/2023 3:03 PM    Performed by: Tutu Meier MD  Authorized by: Tutu Meier MD    Indications / Diagnosis:  Chest pain, SOB  ECG reviewed by me, the ED Provider: yes    Patient location:  Bedside and ED  Previous ECG:     Previous ECG:  Compared to current    Comparison ECG info:  April 12, 2023    Similarity:  No change  Interpretation:     Interpretation: normal    Rate:     ECG rate:  75    ECG rate assessment: normal    Rhythm:     Rhythm: sinus rhythm    Ectopy:     Ectopy: none    QRS:     QRS axis:  Normal  Conduction:     Conduction: normal    ST segments:     ST segments:  Normal  T waves:     T waves: normal            ED Course  ED Course as of 07/25/23 0717   Sun Jul 23, 2023   1443 Hemoglobin(!): 10.3  Within previous baseline measurements. 1507 Creatinine(!): 1.40  Better than previous measurements. [phone] Resident reached out to the Heart Failure team for possible close out-patient follow up. They responded with request for admission. Will admit to [facility]. SBIRT 22yo+    Flowsheet Row Most Recent Value   Initial Alcohol Screen: US AUDIT-C     1. How often do you have a drink containing alcohol? 1 Filed at: 07/23/2023 1344   Audit-C Score 1 Filed at: 07/23/2023 1344                Medical Decision Making  Patient is a 53-year-old male with past medical history of diastolic heart failure, CKD stage III, diabetes, hypertension, asthma & high cholesterol. He presents for evaluation of shortness of breath. Also experienced 1 episode of 6/10 retrosternal chest pain, self resolving. Takes 1 mg Bumex QID  Recently saw cardiology for weight gain    Vital signs are hemodynamically stable, afebrile, satting 98% on RA, physical exam shows well appearing patient not in acute distress. Lungs CTA. abdominal distension, B/L LE swelling. Otherwise PE WNL    Ddx including but not limited to: acute exacerbation of CHF vs. ACS vs. Asthma exacerbation vs. Pneumonia     Plan: Workup will include EKG, chest xray, CBC, CMP, BMP, troponin  Will monitor closely and reassess. Give 100 mg furosemide (home dose of 1 mg Bumex QID equivalent to 160 mg lasix). Due to likely exacerbation of CHF, will give 1.25x daily dose of diuretic)    Reassessment/Disposition:   Creatinine 1.40 (patient's baseline is 1.9)  Elevated glucose  CBC WNL for patient based on recent labwork  Troponin 0hr17, troponin 2 hr 16      Patient remains hemodynamically stable, satting 97% on RA. Denying any recurrent episodes of chest pain.    Discussed with heart failure team; recommend inpatient admission due to heart failure exacerbation    Disposition: Admit to [facility] as inpatient for eval and treatment. Patient understands and is agreeable to plan. Amount and/or Complexity of Data Reviewed  Labs: ordered. Radiology: ordered and independent interpretation performed. Risk  Decision regarding hospitalization. Disposition  Final diagnoses:   Acute on chronic heart failure (HCC)   Chest pain     Time reflects when diagnosis was documented in both MDM as applicable and the Disposition within this note     Time User Action Codes Description Comment    7/23/2023  6:42 PM Devang Ingram Add [I50.33] Acute on chronic diastolic heart failure ([address])     7/23/2023 11:32 PM Lisa Keith [I50.9] Acute on chronic heart failure ([address])     7/23/2023 11:32 PM Richard Singh Add [R07.9] Chest pain       ED Disposition     ED Disposition   Admit    Condition   Stable    Date/Time   Sun Jul 23, 2023  6:36 PM    Comment   Case was discussed with Mary Alice Briceno and the patient's admission status was agreed to be Admission Status: inpatient status to the service of Dr. Buddy Gandara. Follow-up Information    None         Current Discharge Medication List      CONTINUE these medications which have NOT CHANGED    Details   Accu-Chek FastClix Lancets MISC Test blood sugar twice daily  Qty: 200 each, Refills: 3    Associated Diagnoses: Type 2 diabetes mellitus with hyperglycemia, without long-term current use of insulin ([facility])      albuterol (PROVENTIL HFA,VENTOLIN HFA) 90 mcg/act inhaler INHALE 2 PUFFS EVERY 4 (FOUR) HOURS AS NEEDED FOR WHEEZING OR SHORTNESS OF BREATH  Qty: 18 g, Refills: 3    Associated Diagnoses: Mild intermittent asthma without complication      apixaban (Eliquis) 5 mg Take 1 tablet (5 mg total) by mouth 2 (two) times a day  Qty: 60 tablet, Refills: 5    Associated Diagnoses: A-fib ([address]);  Atrial fibrillation, unspecified type ([facility])      atorvastatin (LIPITOR) 10 mg tablet TAKE 1 TABLET BY MOUTH EVERY DAY  Qty: 90 tablet, Refills: 2    Associated Diagnoses: Mixed hyperlipidemia      benzonatate (TESSALON PERLES) 100 mg capsule Take 1 capsule (100 mg total) by mouth 3 (three) times a day as needed (for cough)  Qty: 30 capsule, Refills: 0    Associated Diagnoses: Severe persistent asthma without complication      Blood Glucose Monitoring Suppl (Accu-Chek Guide) w/Device KIT Use 2 (two) times a day Test blood sugar twice daily  Qty: 1 kit, Refills: 0    Associated Diagnoses: Type 2 diabetes mellitus with hyperglycemia, without long-term current use of insulin ([facility])      budesonide-formoterol (Symbicort) 160-4.5 mcg/act inhaler Inhale 2 puffs 2 (two) times a day Rinse mouth after use. Qty: 30.6 g, Refills: 2    Associated Diagnoses: Moderate persistent asthma with acute exacerbation      bumetanide (BUMEX) 1 mg tablet Take 4 tablets (4 mg total) by mouth 2 (two) times a day  Qty: 240 tablet, Refills: 5    Associated Diagnoses: Acute on chronic heart failure with preserved ejection fraction ([facility])      Empagliflozin (JARDIANCE) 10 MG TABS tablet Take 1 tablet (10 mg total) by mouth daily  Qty: 30 tablet, Refills: 11    Associated Diagnoses: Acute on chronic right-sided heart failure ([address]); Type 2 diabetes mellitus with stage 3b chronic kidney disease, without long-term current use of insulin ([facility])      EPINEPHrine (EPIPEN) 0.3 mg/0.3 mL SOAJ Inject 0.3 mL (0.3 mg total) into a muscle once for 1 dose  Qty: 0.6 mL, Refills: 0    Associated Diagnoses: Anaphylaxis, initial encounter      Fasenra Pen 30 MG/ML SOAJ       levalbuterol (Xopenex) 1.25 mg/3 mL nebulizer solution Take 3 mL (1.25 mg total) by nebulization as needed for wheezing or shortness of breath    Associated Diagnoses: Mild intermittent asthma without complication      metolazone (ZAROXOLYN) 2.5 mg tablet Take 1 tablet by mouth as directed by provider. Take 20-30 minutes before Bumex dose and with additional potassium.   Qty: 90 tablet, Refills: 1    Associated Diagnoses: Acute on chronic heart failure with preserved ejection fraction (HCC)      metoprolol succinate (TOPROL-XL) 50 mg 24 hr tablet Take 1 tablet (50 mg total) by mouth daily  Qty: 90 tablet, Refills: 3    Associated Diagnoses: Chronic heart failure with preserved ejection fraction (HCC)      montelukast (SINGULAIR) 10 mg tablet Take 1 tablet (10 mg total) by mouth daily at bedtime  Qty: 90 tablet, Refills: 1    Associated Diagnoses: Moderate persistent asthma with acute exacerbation      pantoprazole (PROTONIX) 40 mg tablet Take 1 tablet (40 mg total) by mouth daily  Qty: 21 tablet, Refills: 0    Associated Diagnoses: Severe persistent asthma with acute exacerbation      potassium chloride (K-DUR,KLOR-CON) 20 mEq tablet Take 2 tablets (40 mEq total) by mouth 3 (three) times a day  Qty: 180 tablet, Refills: 5    Associated Diagnoses: Diuretic-induced hypokalemia      pregabalin (LYRICA) 50 mg capsule Take 1 caps. at bedtime  Qty: 30 capsule, Refills: 5    Associated Diagnoses: Diabetic polyneuropathy associated with type 2 diabetes mellitus ([facility])      sitaGLIPtin (Januvia) 100 mg tablet Take 1/2 tab. daily  Qty: 30 tablet, Refills: 5    Associated Diagnoses: Type 2 diabetes mellitus with stage 3b chronic kidney disease, without long-term current use of insulin ([facility])      Spiriva Respimat 1.25 MCG/ACT AERS inhaler INHALE 2 PUFFS BY MOUTH EVERY DAY  Qty: 4 g, Refills: 5    Associated Diagnoses: Moderate persistent asthma with acute exacerbation           No discharge procedures on file. PDMP Review       Value Time User    PDMP Reviewed  Yes 9/23/2021 12:07 AM Luna Moreno DO           ED Provider  Attending physically available and evaluated Durgana Seats. I managed the patient along with the ED Attending.     Electronically Signed by         Ferny Sapp MD  07/23/23 [address], [place]  07/25/23 2095

## 2023-07-23 NOTE — ASSESSMENT & PLAN NOTE
Lab Results   Component Value Date    EGFR 55 07/23/2023    EGFR 37 06/19/2023    EGFR 31 04/18/2023    CREATININE 1.40 (H) 07/23/2023    CREATININE 1.94 (H) 06/19/2023    CREATININE 2.23 (H) 04/18/2023     Renal function remains at baseline  Monitor renal function while on IV diuresis  Monitor for electrolytes, replete potassium as needed

## 2023-07-23 NOTE — H&P
[address] [facility]  H&P  Name: Shea Vallecillo 66 y.o. male I MRN: [id number]  Unit/Bed#: QCD I Date of Admission: 7/23/2023   Date of Service: 7/23/2023 I Hospital Day: 0      Assessment/Plan   * Acute on chronic diastolic heart failure [facility])  Assessment & Plan  Wt Readings from Last 3 Encounters:   07/18/23 (!) 158 kg (349 lb)   05/01/23 (!) 149 kg (327 lb 6.4 oz)   04/18/23 (!) 147 kg (323 lb 13.7 oz)     Patient with diastolic heart failure, presented to ER with ongoing dyspnea and weight gain. His dry weight is 332 pounds. He reports he weighed 341 pounds this morning. On exam 3+ pitting edema bilaterally  Mildly elevated proBNP, chest x-ray consistent with vascular congestion. Patient's home medication is Bumex 4 mg twice daily with metolazone, patient is compliant. Start patient on IV Lasix 100 mg twice daily  Continue home medication Jardiance  Intake, output, daily weight  Heart failure consult        Severe persistent asthma without complication  Assessment & Plan  follows with Dr. Julia Warren as outpatient. Resume home inhalers    Stage 3a chronic kidney disease [facility])  Assessment & Plan  Lab Results   Component Value Date    EGFR 55 07/23/2023    EGFR 37 06/19/2023    EGFR 31 04/18/2023    CREATININE 1.40 (H) 07/23/2023    CREATININE 1.94 (H) 06/19/2023    CREATININE 2.23 (H) 04/18/2023     Renal function remains at baseline  Monitor renal function while on IV diuresis  Monitor for electrolytes, replete potassium as needed    Paroxysmal atrial fibrillation (HCC)  Assessment & Plan  Rate controlled on metoprolol, anticoagulated with Eliquis  Today sinus rhythm noted    Type 2 diabetes mellitus, without long-term current use of insulin (HCC)  Assessment & Plan  Start sliding scale    No results for input(s): "POCGLU" in the last 72 hours.     Blood Sugar Average: Last 72 hrs:  Hold home medication Januvia  Start sliding scale    Primary hypertension  Assessment & Plan  Monitor blood pressure while on IV Lasix  Continue home medication metoprolol       VTE Prophylaxis: Apixaban (Eliquis)  / sequential compression device   Code Status: full code       Anticipated Length of Stay:  Patient will be admitted on an Inpatient basis with an anticipated length of stay of  > 2 midnights. Justification for Hospital Stay: CHF exacerbation    Total Time for Visit, including Counseling / Coordination of Care: 60 minutes. Greater than 50% of this total time spent on direct patient counseling and coordination of care. Chief Complaint:   Dyspnea, weight gain    History of Present Illness:    Abigail Cherry is a 66 y.o. male with PMH of chronic diastolic heart failure, paroxysmal A-fib, CKD 3, DM 2, presented to ER with complaint of ongoing dyspnea and weight gain for past several days. Patient recently visited heart failure office outpatient for the same complaints and his Bumex dose was increased, currently taking Bumex 4 mg twice daily and as needed metolazone. However despite taking Bumex 4 mg twice a day with metolazone as needed, patient continues to have dyspnea and weight gain. Patient reports his dry weight is 332 pounds however this morning weight was 341 pounds. Patient also noted to have bilateral lower extremity pitting edema. Upon arrival to ER, patient is not hypoxic or tachypneic. EKG reveals sinus rhythm. Troponin negative. Mildly elevated proBNP. Chest x-ray consistent with pulm vascular congestion. Patient will be admitted for IV diuresis. Review of Systems:    Review of Systems   Constitutional: Positive for unexpected weight change. Negative for chills and fever. HENT: Negative for ear pain and sore throat. Eyes: Negative for pain and visual disturbance. Respiratory: Positive for shortness of breath. Negative for cough. Cardiovascular: Positive for leg swelling. Negative for chest pain and palpitations. Gastrointestinal: Positive for abdominal distention. Negative for abdominal pain and vomiting. Genitourinary: Negative for dysuria and hematuria. Musculoskeletal: Negative for arthralgias and back pain. Skin: Negative for color change and rash. Neurological: Negative for seizures and syncope. All other systems reviewed and are negative. Past Medical and Surgical History:     Past Medical History:   Diagnosis Date   • Acute gastritis without hemorrhage     last assessed 3/24/17   • Asthma    • Atrial fibrillation [facility])    • Bilateral leg edema 02/19/2020   • CHF (congestive heart failure) ([facility])    • Coronary artery disease    • Daytime sleepiness 07/17/2019   • Diabetes mellitus ([address])    • Dyspnea on exertion 10/11/2021   • Edema of both feet 01/23/2020   • Gastric bypass status for obesity    • Hyperlipidemia    • Hypertension    • Hypokalemia 11/14/2021   • Impaired fasting glucose     last assessed 5/10/17   • Knee sprain, bilateral 06/14/2018   • Leg cramps 06/16/2022   • Obesity    • Viral gastroenteritis     last assessed 11/4/16       Past Surgical History:   Procedure Laterality Date   • CARDIAC CATHETERIZATION N/A 3/9/2022    Procedure: CARDIAC RHC W/ PRESSURE SENSOR;  Surgeon: Shukri Feng MD;  Location: [facility] CARD[facility]AC [facility]; Service: Cardiology   • CARDIAC CATHETERIZATION N/A 9/6/2022    Procedure: Cardiac catheterization;  Surgeon: Se Knight DO;  Location: [facility]; Service: Cardiology   • CARDIAC CATHETERIZATION N/A 9/6/2022    Procedure: Cardiac Coronary Angiogram;  Surgeon: Se Knight DO;  Location: [facility]; Service: Cardiology   • CARPAL TUNNEL RELEASE      bilateral   • GASTRIC BYPASS  2004    with han en y   • HERNIA REPAIR      ventral inscisional   • TONSILLECTOMY         Meds/Allergies:    Prior to Admission medications    Medication Sig Start Date End Date Taking?  Authorizing Provider   Accu-Chek FastClix Lancets MISC Test blood sugar twice daily 11/16/21   Yary Perera MD albuterol (PROVENTIL HFA,VENTOLIN HFA) 90 mcg/act inhaler INHALE 2 PUFFS EVERY 4 (FOUR) HOURS AS NEEDED FOR WHEEZING OR SHORTNESS OF BREATH 7/6/22   Park Voss MD   apixaban (Eliquis) 5 mg Take 1 tablet (5 mg total) by mouth 2 (two) times a day 11/21/22   Taya Rascon MD   atorvastatin (LIPITOR) 10 mg tablet TAKE 1 TABLET BY MOUTH EVERY DAY 1/27/23   Taya Rascon MD   benzonatate (TESSALON PERLES) 100 mg capsule Take 1 capsule (100 mg total) by mouth 3 (three) times a day as needed (for cough) 12/20/22   Park Voss MD   Blood Glucose Monitoring Suppl (Accu-Chek Guide) w/Device KIT Use 2 (two) times a day Test blood sugar twice daily 8/5/21   Park Voss MD   budesonide-formoterol (Symbicort) 160-4.5 mcg/act inhaler Inhale 2 puffs 2 (two) times a day Rinse mouth after use. 11/29/22   Park Voss MD   bumetanide (BUMEX) 1 mg tablet Take 4 tablets (4 mg total) by mouth 2 (two) times a day 7/18/23   Dory Murillo PA-C   Empagliflozin (JARDIANCE) 10 MG TABS tablet Take 1 tablet (10 mg total) by mouth daily 5/26/23   Taya Rascon MD   EPINEPHrine (EPIPEN) 0.3 mg/0.3 mL SOAJ Inject 0.3 mL (0.3 mg total) into a muscle once for 1 dose 2/21/23 4/10/23  [facility], MD Johnson Pen 30 MG/ML SOAJ  3/24/23   Historical Provider, MD   levalbuterol (Xopenex) 1.25 mg/3 mL nebulizer solution Take 3 mL (1.25 mg total) by nebulization as needed for wheezing or shortness of breath 3/21/23   RODRIGUE Uriostegui   metolazone (ZAROXOLYN) 2.5 mg tablet Take 1 tablet by mouth as directed by provider. Take 20-30 minutes before Bumex dose and with additional potassium.  7/18/23   Dory Murillo PA-C   metoprolol succinate (TOPROL-XL) 50 mg 24 hr tablet Take 1 tablet (50 mg total) by mouth daily 9/27/22   Taya Rascon MD   montelukast (SINGULAIR) 10 mg tablet Take 1 tablet (10 mg total) by mouth daily at bedtime 2/3/22 7/18/23  Park Voss MD pantoprazole (PROTONIX) 40 mg tablet Take 1 tablet (40 mg total) by mouth daily 3/21/23   RODRIGUE Duong   potassium chloride (K-DUR,KLOR-CON) 20 mEq tablet Take 2 tablets (40 mEq total) by mouth 3 (three) times a day  Patient taking differently: Take 40 mEq by mouth 2 (two) times a day 23   Macho Snyder PA-C   pregabalin (LYRICA) 50 mg capsule Take 1 caps. at bedtime 23   Keara De Leon MD   sitaGLIPtin (Januvia) 100 mg tablet Take 1/2 tab. daily 22   Keara De Leon MD   Spiriva Respimat 1.25 MCG/ACT AERS inhaler INHALE 2 PUFFS BY MOUTH EVERY DAY 23   Keara De Leon MD     I have reviewed home medications using allscripts. Allergies: Allergies   Allergen Reactions   • Azithromycin Other (See Comments)     Shaky, uneasy feeling    • Bactrim [Sulfamethoxazole-Trimethoprim] Other (See Comments)     shakiness       Social History:     Marital Status: /Civil Union     Substance Use History:   Social History     Substance and Sexual Activity   Alcohol Use Yes   • Alcohol/week: 2.0 standard drinks of alcohol   • Types: 2 Shots of liquor per week    Comment: social     Social History     Tobacco Use   Smoking Status Former   • Types: Pipe, Cigars   • Start date:    • Quit date: 2021   • Years since quittin.5   Smokeless Tobacco Never   Tobacco Comments    cigar once a day     Social History     Substance and Sexual Activity   Drug Use No       Family History:    Family History   Problem Relation Age of Onset   • Stroke Mother    • Hypertension Father    • Cancer Father    • COPD Father        Physical Exam:     Vitals:   Blood Pressure: 127/62 (23 1845)  Pulse: 84 (23 1845)  Respirations: 16 (23 184)  SpO2: 99 % (23)    Physical Exam  Vitals and nursing note reviewed. Constitutional:       General: He is not in acute distress. Appearance: He is well-developed. He is obese.    HENT:      Head: Normocephalic and atraumatic. Eyes:      Conjunctiva/sclera: Conjunctivae normal.   Cardiovascular:      Rate and Rhythm: Normal rate and regular rhythm. Heart sounds: No murmur heard. Pulmonary:      Effort: Pulmonary effort is normal. No respiratory distress. Breath sounds: Normal breath sounds. Abdominal:      Palpations: Abdomen is soft. Tenderness: There is no abdominal tenderness. Musculoskeletal:         General: No swelling. Cervical back: Neck supple. Right lower leg: Edema present. Left lower leg: Edema present. Comments: 3+ pitting edema bilaterally   Skin:     General: Skin is warm and dry. Capillary Refill: Capillary refill takes less than 2 seconds. Neurological:      Mental Status: He is alert. Psychiatric:         Mood and Affect: Mood normal.           Additional Data:     Lab Results: I have personally reviewed pertinent reports. Results from last 7 days   Lab Units 07/23/23  1430   WBC Thousand/uL 6.29   HEMOGLOBIN g/dL 10.3*   HEMATOCRIT % 34.1*   PLATELETS Thousands/uL 248   NEUTROS PCT % 77*   LYMPHS PCT % 13*   MONOS PCT % 9   EOS PCT % 0     Results from last 7 days   Lab Units 07/23/23  1430   SODIUM mmol/L 138   POTASSIUM mmol/L 4.2   CHLORIDE mmol/L 107   CO2 mmol/L 24   BUN mg/dL 18   CREATININE mg/dL 1.40*   ANION GAP mmol/L 7   CALCIUM mg/dL 8.2*   ALBUMIN g/dL 3.2*   TOTAL BILIRUBIN mg/dL 0.37   ALK PHOS U/L 107   ALT U/L 32   AST U/L 45   GLUCOSE RANDOM mg/dL 216*                       Imaging: I have personally reviewed pertinent reports. XR chest 1 view portable   ED Interpretation by Igor Reeder DO (07/23 1740)   Mild vascular congestion, worse than previous CXR          EKG, Pathology, and Other Studies Reviewed on Admission:   · EKG: Sinus rhythm    Allscripts / Epic Records Reviewed: Yes     ** Please Note: This note has been constructed using a voice recognition system.  **

## 2023-07-23 NOTE — ED ATTENDING ATTESTATION
7/23/2023  Lydia Gutierrez DO, saw and evaluated the patient. I have discussed the patient with the resident/non-physician practitioner and agree with the resident's/non-physician practitioner's findings, Plan of Care, and MDM as documented in the resident's/non-physician practitioner's note, except where noted. All available labs and Radiology studies were reviewed. I was present for key portions of any procedure(s) performed by the resident/non-physician practitioner and I was immediately available to provide assistance. At this point I agree with the current assessment done in the Emergency Department. I have conducted an independent evaluation of this patient a history and physical is as follows:    61-year-old male presents via EMS for increased and ongoing dyspnea associated with abdominal distention, increased weight gain and decreased urination. Patient has a history of heart failure for which he takes Bumex daily. His dose was increased from 3 mg twice daily to 4 mg twice daily between 2 and 3 weeks ago. He did follow-up with his cardiologist about 5 days ago and was advised to be admitted at that time but he wanted to try medication changes first since he has been admitted over a dozen times in the last 2 years for similar symptoms. He denies chest pain and other associated ACS/MI symptoms. No recent travel or sick contacts. ROS: Denies f/c, HA, LH/dizziness, diaphoresis, abdominal pain, n/v/d. 12 system ROS o/w negative. PE: NAD, appears comfortable, alert; PERRL, EOMI; MMM, no posterior oropharyngeal exudate, edema or erythema; HRR, no murmur, monitor shows sinus rhythm at 72 bpm; lungs CTA w/o w/r/r, POx 97% on RA (nl); abdomen s/nt, distention with fluid wave to percussion, nl BS in all 4 quadrants, obese; (-) LE edema or calf TTP, FROM extremities x4; skin p/w/d; CNs GI/NF, oriented.     MDM/DDx: Dyspnea - CHF exacerbation, ACS/MI, less likely but at risk for pneumonia, pneumothorax or PE. I independently reviewed and interpreted ordered labs and EKG from this encounter. A/P: Will check cardiac w/u with BNP, treat symptoms with IV diuretic as per the heart failure algorithm, reevaluate for disposition.     ED Course         Critical Care Time  Procedures

## 2023-07-23 NOTE — ASSESSMENT & PLAN NOTE
Wt Readings from Last 3 Encounters:   07/18/23 (!) 158 kg (349 lb)   05/01/23 (!) 149 kg (327 lb 6.4 oz)   04/18/23 (!) 147 kg (323 lb 13.7 oz)     Patient with diastolic heart failure, presented to ER with ongoing dyspnea and weight gain. His dry weight is 332 pounds. He reports he weighed 341 pounds this morning. On exam 3+ pitting edema bilaterally  Mildly elevated proBNP, chest x-ray consistent with vascular congestion. Patient's home medication is Bumex 4 mg twice daily with metolazone, patient is compliant.   Start patient on IV Lasix 100 mg twice daily  Continue home medication Jardiance  Intake, output, daily weight  Heart failure consult

## 2023-07-24 ENCOUNTER — PATIENT OUTREACH (OUTPATIENT)
Dept: CARDIOLOGY CLINIC | Facility: CLINIC | Age: 58
End: 2023-07-24

## 2023-07-24 LAB
ANION GAP SERPL CALCULATED.3IONS-SCNC: 8 MMOL/L
ATRIAL RATE: 78 BPM
BUN SERPL-MCNC: 21 MG/DL (ref 5–25)
CALCIUM SERPL-MCNC: 7.7 MG/DL (ref 8.3–10.1)
CHLORIDE SERPL-SCNC: 104 MMOL/L (ref 96–108)
CO2 SERPL-SCNC: 26 MMOL/L (ref 21–32)
CREAT SERPL-MCNC: 1.52 MG/DL (ref 0.6–1.3)
ERYTHROCYTE [DISTWIDTH] IN BLOOD BY AUTOMATED COUNT: 14.6 % (ref 11.6–15.1)
GFR SERPL CREATININE-BSD FRML MDRD: 50 ML/MIN/1.73SQ M
GLUCOSE SERPL-MCNC: 160 MG/DL (ref 65–140)
GLUCOSE SERPL-MCNC: 160 MG/DL (ref 65–140)
GLUCOSE SERPL-MCNC: 181 MG/DL (ref 65–140)
GLUCOSE SERPL-MCNC: 212 MG/DL (ref 65–140)
GLUCOSE SERPL-MCNC: 214 MG/DL (ref 65–140)
HCT VFR BLD AUTO: 27.3 % (ref 36.5–49.3)
HGB BLD-MCNC: 8.2 G/DL (ref 12–17)
MCH RBC QN AUTO: 25.1 PG (ref 26.8–34.3)
MCHC RBC AUTO-ENTMCNC: 30 G/DL (ref 31.4–37.4)
MCV RBC AUTO: 84 FL (ref 82–98)
P AXIS: 18 DEGREES
PLATELET # BLD AUTO: 192 THOUSANDS/UL (ref 149–390)
PMV BLD AUTO: 10.1 FL (ref 8.9–12.7)
POTASSIUM SERPL-SCNC: 3.7 MMOL/L (ref 3.5–5.3)
PR INTERVAL: 126 MS
QRS AXIS: 23 DEGREES
QRSD INTERVAL: 94 MS
QT INTERVAL: 414 MS
QTC INTERVAL: 462 MS
RBC # BLD AUTO: 3.27 MILLION/UL (ref 3.88–5.62)
SODIUM SERPL-SCNC: 138 MMOL/L (ref 135–147)
T WAVE AXIS: 50 DEGREES
VENTRICULAR RATE: 75 BPM
WBC # BLD AUTO: 7.03 THOUSAND/UL (ref 4.31–10.16)

## 2023-07-24 PROCEDURE — 80048 BASIC METABOLIC PNL TOTAL CA: CPT | Performed by: FAMILY MEDICINE

## 2023-07-24 PROCEDURE — 85027 COMPLETE CBC AUTOMATED: CPT | Performed by: FAMILY MEDICINE

## 2023-07-24 PROCEDURE — 82948 REAGENT STRIP/BLOOD GLUCOSE: CPT

## 2023-07-24 PROCEDURE — 94760 N-INVAS EAR/PLS OXIMETRY 1: CPT

## 2023-07-24 PROCEDURE — 93010 ELECTROCARDIOGRAM REPORT: CPT | Performed by: INTERNAL MEDICINE

## 2023-07-24 PROCEDURE — 99232 SBSQ HOSP IP/OBS MODERATE 35: CPT | Performed by: NURSE PRACTITIONER

## 2023-07-24 PROCEDURE — 36415 COLL VENOUS BLD VENIPUNCTURE: CPT | Performed by: FAMILY MEDICINE

## 2023-07-24 PROCEDURE — 99255 IP/OBS CONSLTJ NEW/EST HI 80: CPT | Performed by: INTERNAL MEDICINE

## 2023-07-24 RX ORDER — POTASSIUM CHLORIDE 20 MEQ/1
40 TABLET, EXTENDED RELEASE ORAL 2 TIMES DAILY
Status: DISCONTINUED | OUTPATIENT
Start: 2023-07-24 | End: 2023-07-28 | Stop reason: HOSPADM

## 2023-07-24 RX ORDER — POTASSIUM CHLORIDE 20 MEQ/1
40 TABLET, EXTENDED RELEASE ORAL 2 TIMES DAILY
Status: DISCONTINUED | OUTPATIENT
Start: 2023-07-24 | End: 2023-07-24

## 2023-07-24 RX ORDER — BUMETANIDE 0.25 MG/ML
1 INJECTION INTRAMUSCULAR; INTRAVENOUS CONTINUOUS
Status: DISCONTINUED | OUTPATIENT
Start: 2023-07-24 | End: 2023-07-26

## 2023-07-24 RX ADMIN — APIXABAN 5 MG: 5 TABLET, FILM COATED ORAL at 10:40

## 2023-07-24 RX ADMIN — Medication 1 MG/HR: at 13:51

## 2023-07-24 RX ADMIN — FUROSEMIDE 100 MG: 10 INJECTION, SOLUTION INTRAMUSCULAR; INTRAVENOUS at 10:43

## 2023-07-24 RX ADMIN — ATORVASTATIN CALCIUM 10 MG: 10 TABLET, FILM COATED ORAL at 17:33

## 2023-07-24 RX ADMIN — BUDESONIDE AND FORMOTEROL FUMARATE DIHYDRATE 2 PUFF: 160; 4.5 AEROSOL RESPIRATORY (INHALATION) at 17:33

## 2023-07-24 RX ADMIN — METOPROLOL SUCCINATE 50 MG: 50 TABLET, EXTENDED RELEASE ORAL at 10:34

## 2023-07-24 RX ADMIN — APIXABAN 5 MG: 5 TABLET, FILM COATED ORAL at 17:33

## 2023-07-24 RX ADMIN — PREGABALIN 50 MG: 50 CAPSULE ORAL at 21:00

## 2023-07-24 RX ADMIN — POTASSIUM CHLORIDE 40 MEQ: 1500 TABLET, EXTENDED RELEASE ORAL at 10:40

## 2023-07-24 RX ADMIN — BUDESONIDE AND FORMOTEROL FUMARATE DIHYDRATE 2 PUFF: 160; 4.5 AEROSOL RESPIRATORY (INHALATION) at 10:34

## 2023-07-24 RX ADMIN — INSULIN LISPRO 1 UNITS: 100 INJECTION, SOLUTION INTRAVENOUS; SUBCUTANEOUS at 06:06

## 2023-07-24 RX ADMIN — INSULIN LISPRO 2 UNITS: 100 INJECTION, SOLUTION INTRAVENOUS; SUBCUTANEOUS at 12:17

## 2023-07-24 RX ADMIN — INSULIN LISPRO 2 UNITS: 100 INJECTION, SOLUTION INTRAVENOUS; SUBCUTANEOUS at 21:03

## 2023-07-24 RX ADMIN — POTASSIUM CHLORIDE 40 MEQ: 1500 TABLET, EXTENDED RELEASE ORAL at 17:33

## 2023-07-24 RX ADMIN — MONTELUKAST 10 MG: 10 TABLET, FILM COATED ORAL at 21:00

## 2023-07-24 RX ADMIN — UMECLIDINIUM 1 PUFF: 62.5 AEROSOL, POWDER ORAL at 10:41

## 2023-07-24 RX ADMIN — PANTOPRAZOLE SODIUM 40 MG: 40 TABLET, DELAYED RELEASE ORAL at 10:40

## 2023-07-24 RX ADMIN — Medication 1 MG/HR: at 20:03

## 2023-07-24 RX ADMIN — EMPAGLIFLOZIN 10 MG: 10 TABLET, FILM COATED ORAL at 10:40

## 2023-07-24 RX ADMIN — INSULIN LISPRO 1 UNITS: 100 INJECTION, SOLUTION INTRAVENOUS; SUBCUTANEOUS at 17:34

## 2023-07-24 NOTE — ASSESSMENT & PLAN NOTE
Lab Results   Component Value Date    EGFR 50 07/24/2023    EGFR 55 07/23/2023    EGFR 37 06/19/2023    CREATININE 1.52 (H) 07/24/2023    CREATININE 1.40 (H) 07/23/2023    CREATININE 1.94 (H) 06/19/2023     · Baseline appears to be 1.5-1.7; patient currently at baseline  · Monitor renal function while on IV diuresis  · Monitor for electrolytes, replete potassium as needed

## 2023-07-24 NOTE — CONSULTS
Advanced Heart Failure / Pulmonary Hypertension Service Consultation    Karlie Miller 66 y.o. male  MRN: [id number]  Unit/Bed#: [facility] 411-01; Encounter: [id number]    Assessment:  Principal Problem:    Acute on chronic diastolic heart failure ([address])  Active Problems:    Primary hypertension    Type 2 diabetes mellitus, without long-term current use of insulin (HCC)    Paroxysmal atrial fibrillation (HCC)    Stage 3a chronic kidney disease ([address])    Severe persistent asthma without complication      HPI:   Karlie Miller is a 80-year-old man with PMH as below who presented to [facility] on 07/23/2023 with worsening SOB/TELLES, weight gain, and decreased urinary response.  (no priors). CXR with "slight increased pulmonary vascular congestion." Started on IV Lasix, and cardiology consulted. Of note, seen in cardiology office on 07/18/2023 and noted be "Up 22 lbs weight gain since being discharged from hospital in 04/2023 (6+ lbs gain overnight on home scale) with worsening TELLES, bendopnea, bilateral LE swelling, and fatigue. [facility] Advised to continue current Bumex dosing and take metolazone 5 mg x1 tomorrow AM with additional potassium."     Patient seen and examined. Reports taking 5 mg metolazone two days in a row last week with increased urine output but no significant weight loss. Reports better urinary response to IV diuretics received this AM.      Heart failure service was consulted for "acute on chronic diastolic heart failure." Patient follows with Dr. Clem Joiner for outpatient cardiology. Objective: Intake/ Output: 960 mL / 2150 mL (intake accurate?). Weight: Not done. Telemetry: NSR, rates in 70-80s. Today's Plan:  • Received 100 mg IV Lasix this AM. Start Bumex drip at 1 mg/hr. • Hemoglobin 8.2 mg/dL (goal Hgb >8 mg/dL given cardiac history). Continue to monitor. • Can consider repeat TTE once euvolemic (last TTE in 11/2021).      Plan:  Acute on chronic HFpEF; LVEF 55%; LVIDd 6.0 cm; NYHA III; ACC/AHA Stage C              Etiology: Afib, HTN, obesity phenotype. TTE 11/21/2021: LVEF 55%. LVIDd 6.0 cm. Grade 1 DD. Normal RV. Trace TR. C 09/06/2022: no obstructive CAD. Pharmacotherapies / Neurohormonal Blockade:  --Beta Blocker: metoprolol succinate 50 mg daily. --ARNi / ACEi / ARB: No.  --Aldosterone Antagonist: No.  --SGLT2 Inhibitor: empagliflozin 10 mg daily. --Home Diuretic: Bumex 4 mg BID with potassium 40 mEq BID with PRN metolazone 2.5 mg.   --Inpatient Diuretic: IV Bumex 1 mg/hr with potassium 40 mEq BID. Advanced Therapies:              --CardioMEMS: implanted 03/09/2022. Goal PAd of 15. Atrial fibrillation, parosxysmal              [id number] = 3 (HF, HTN, DM). Anticoagulation on Eliquis. Rate control: BB as above. Rhythm control: No.     Chronic kidney disease, stage IIIb              Baseline creatinine of 1.5-2.0. Today, creatinine of 1.52 (1.40 on 07/23).    Hypertension  Asthma, severe persistent: follows with Dr. Allison Ordoñez as outpatient.    Obstructive sleep apnea  Diabetes mellitus, type II  History of gastric bypass (Samuel-en-Y) surgery   History of tobacco abuse    Past Medical History:   Diagnosis Date   • Acute gastritis without hemorrhage     last assessed 3/24/17   • Asthma    • Atrial fibrillation [facility])    • Bilateral leg edema 02/19/2020   • CHF (congestive heart failure) (HCC)    • Coronary artery disease    • Daytime sleepiness 07/17/2019   • Diabetes mellitus ([address])    • Dyspnea on exertion 10/11/2021   • Edema of both feet 01/23/2020   • Gastric bypass status for obesity    • Hyperlipidemia    • Hypertension    • Hypokalemia 11/14/2021   • Impaired fasting glucose     last assessed 5/10/17   • Knee sprain, bilateral 06/14/2018   • Leg cramps 06/16/2022   • Obesity    • Viral gastroenteritis     last assessed 11/4/16       Review of Systems   Constitutional: Positive for unexpected weight change. Negative for activity change, appetite change and fatigue. HENT: Negative for congestion. Eyes: Negative. Respiratory: Positive for shortness of breath. Negative for cough and chest tightness. Cardiovascular: Positive for leg swelling. Negative for chest pain and palpitations. Gastrointestinal: Positive for abdominal distention. Negative for abdominal pain, diarrhea and nausea. Endocrine: Negative. Genitourinary: Negative for decreased urine volume, difficulty urinating, dysuria and urgency. Musculoskeletal: Negative. Skin: Negative. Allergic/Immunologic: Negative. Neurological: Negative for dizziness, syncope, weakness and light-headedness. Psychiatric/Behavioral: Negative for confusion and sleep disturbance. The patient is not nervous/anxious.         Current Facility-Administered Medications:   •  acetaminophen (TYLENOL) tablet 650 mg, 650 mg, Oral, Q6H PRN, Ashley Bose MD  •  albuterol (PROVENTIL HFA,VENTOLIN HFA) inhaler 2 puff, 2 puff, Inhalation, Q4H PRN, Ashley Bose MD  •  albuterol inhalation solution 2.5 mg, 2.5 mg, Nebulization, Q4H PRN, Ashley Bose MD  •  apixaban (ELIQUIS) tablet 5 mg, 5 mg, Oral, BID, Devang Ingram MD, 5 mg at 07/23/23 2021  •  atorvastatin (LIPITOR) tablet 10 mg, 10 mg, Oral, Daily, Devang Ingram MD  •  budesonide-formoterol (SYMBICORT) 160-4.5 mcg/act inhaler 2 puff, 2 puff, Inhalation, BID, Ashley Bose MD, 2 puff at 07/23/23 2033  •  Empagliflozin (JARDIANCE) tablet 10 mg, 10 mg, Oral, Daily, Devang Ingram MD  •  furosemide (LASIX) 100 mg in dextrose 5 % 50 mL IVPB, 100 mg, Intravenous, BID (diuretic), Jace Hilario MD, Stopped at 07/23/23 1525  •  insulin lispro (HumaLOG) 100 units/mL subcutaneous injection 1-5 Units, 1-5 Units, Subcutaneous, HS, Devang Ingram MD, 1 Units at 07/23/23 2156  •  insulin lispro (HumaLOG) 100 units/mL subcutaneous injection 1-6 Units, 1-6 Units, Subcutaneous, TID AC, 1 Units at 07/24/23 0606 **AND** Fingerstick Glucose (POCT), , , TID AC, Devang Ingram MD  •  metoprolol succinate (TOPROL-XL) 24 hr tablet 50 mg, 50 mg, Oral, Daily, Devang Ingram MD  •  montelukast (SINGULAIR) tablet 10 mg, 10 mg, Oral, HS, Devang Ingram MD, 10 mg at 23  •  ondansetron (ZOFRAN) injection 4 mg, 4 mg, Intravenous, Q6H PRN, Morgan Howard MD  •  pantoprazole (PROTONIX) EC tablet 40 mg, 40 mg, Oral, Daily, Devang Ingram MD  •  potassium chloride (K-DUR,KLOR-CON) CR tablet 40 mEq, 40 mEq, Oral, BID, Emmanuel Oates PA-C  •  pregabalin (LYRICA) capsule 50 mg, 50 mg, Oral, HS, Morgan Howard MD, 50 mg at 23  •  umeclidinium 62.5 mcg/actuation inhaler AEPB 1 puff, 1 puff, Inhalation, Daily, Morgan Howard MD    Allergies   Allergen Reactions   • Azithromycin Other (See Comments)     Shaky, uneasy feeling    • Bactrim [Sulfamethoxazole-Trimethoprim] Other (See Comments)     shakiness     Social History     Socioeconomic History   • Marital status: /Civil Union     Spouse name: Not on file   • Number of children: Not on file   • Years of education: Not on file   • Highest education level: Not on file   Occupational History   • Not on file   Tobacco Use   • Smoking status: Former     Types: Pipe, Cigars     Start date:      Quit date: 2021     Years since quittin.5   • Smokeless tobacco: Never   • Tobacco comments:     cigar once a day   Vaping Use   • Vaping Use: Never used   Substance and Sexual Activity   • Alcohol use:  Yes     Alcohol/week: 2.0 standard drinks of alcohol     Types: 2 Shots of liquor per week     Comment: social   • Drug use: No   • Sexual activity: Yes     Partners: Female     Birth control/protection: None   Other Topics Concern   • Not on file   Social History Narrative   • Not on file     Social Determinants of Health     Financial Resource Strain: Not on file   Food Insecurity: No Food Insecurity (2023)    Hunger Vital Sign    • Worried About Running Out of Food in the Last Year: Never true    • Ran Out of Food in the Last Year: Never true   Transportation Needs: No Transportation Needs (4/12/2023)    PRAPARE - Transportation    • Lack of Transportation (Medical): No    • Lack of Transportation (Non-Medical): No   Physical Activity: Not on file   Stress: Not on file   Social Connections: Not on file   Intimate Partner Violence: Not on file   Housing Stability: Low Risk  (4/12/2023)    Housing Stability Vital Sign    • Unable to Pay for Housing in the Last Year: No    • Number of Places Lived in the Last Year: 1    • Unstable Housing in the Last Year: No     Family History   Problem Relation Age of Onset   • Stroke Mother    • Hypertension Father    • Cancer Father    • COPD Father        Vitals:  Blood pressure 152/66, pulse 81, temperature 98.4 °F (36.9 °C), temperature source Oral, resp. rate 20, SpO2 94 %. I/O last 3 completed shifts: In: 960 [P.O.:960]  Out: 2150 [Urine:2150]    Weight (last 2 days)     None        Wt Readings from Last 10 Encounters:   07/18/23 (!) 158 kg (349 lb)   05/01/23 (!) 149 kg (327 lb 6.4 oz)   04/18/23 (!) 147 kg (323 lb 13.7 oz)   04/10/23 (!) 154 kg (339 lb 11.2 oz)   03/21/23 (!) 151 kg (332 lb 0.2 oz)   03/15/23 (!) 151 kg (333 lb)   12/28/22 (!) 148 kg (326 lb)   12/27/22 (!) 147 kg (324 lb)   12/20/22 (!) 147 kg (324 lb 9.6 oz)   09/21/22 (!) 146 kg (321 lb 11.2 oz)       Vitals:    07/23/23 2100 07/23/23 2300 07/24/23 0530 07/24/23 0929   BP: 128/56 131/72 121/57 152/66   BP Location: Right arm Right arm Right arm Right arm   Pulse: 82 94 78 81   Resp: 20 21 22 20   Temp:    98.4 °F (36.9 °C)   TempSrc:    Oral   SpO2: 97% 96% 97% 94%       Physical Exam  Vitals reviewed. Constitutional:       General: He is awake. He is not in acute distress. Appearance: Normal appearance. He is well-developed and overweight. HENT:      Head: Normocephalic.       Nose: Nose normal.      Mouth/Throat:      Mouth: Mucous membranes are moist.   Eyes: General: No scleral icterus. Conjunctiva/sclera: Conjunctivae normal.   Neck:      Vascular: JVD present. Trachea: No tracheal deviation. Cardiovascular:      Rate and Rhythm: Normal rate and regular rhythm. No extrasystoles are present. Heart sounds: No murmur heard. Pulmonary:      Effort: Pulmonary effort is normal. No tachypnea, bradypnea or respiratory distress. Breath sounds: Normal air entry. No decreased air movement. No wheezing. Abdominal:      General: Bowel sounds are normal. There is distension. Palpations: Abdomen is soft. Tenderness: There is no abdominal tenderness. Musculoskeletal:      Cervical back: Neck supple. Right lower le+ Edema present. Left lower le+ Edema present. Skin:     General: Skin is warm and dry. Coloration: Skin is not jaundiced or pale. Neurological:      General: No focal deficit present. Mental Status: He is alert and oriented to person, place, and time. Psychiatric:         Attention and Perception: Attention normal.         Mood and Affect: Mood and affect normal.         Speech: Speech normal.         Behavior: Behavior normal. Behavior is cooperative. Thought Content:  Thought content normal.       Lines/Drains/Airways     Active Status     None                Labs & Results:      Results from last 7 days   Lab Units 23  0522 23  1430   WBC Thousand/uL 7.03 6.29   HEMOGLOBIN g/dL 8.2* 10.3*   HEMATOCRIT % 27.3* 34.1*   PLATELETS Thousands/uL 192 248         Results from last 7 days   Lab Units 23  0522 23  1430   POTASSIUM mmol/L 3.7 4.2   CHLORIDE mmol/L 104 107   CO2 mmol/L 26 24   BUN mg/dL 21 18   CREATININE mg/dL 1.52* 1.40*   CALCIUM mg/dL 7.7* 8.2*   ALK PHOS U/L  --  107   ALT U/L  --  32   AST U/L  --  [address] [place] [address], PA-C

## 2023-07-24 NOTE — ASSESSMENT & PLAN NOTE
· BP reasonable continue to monitor blood pressure while on IV Lasix  · Continue home medication metoprolol  · Routine vitals per unit

## 2023-07-24 NOTE — UTILIZATION REVIEW
NOTIFICATION OF INPATIENT ADMISSION   AUTHORIZATION REQUEST   SERVICING FACILITY:   [address] [place] [address]  Address: [address] [place] [address], [address] [place] [address]  Tax ID: [id number]  NPI: [id number] ATTENDING PROVIDER:  Attending Name and NPI#: Tasha Goldsmith Md [[id number]]  Address: [address] [place] [address]  Phone: [phone]   ADMISSION INFORMATION:  Place of Service: Inpatient [address]  Place of Service Code: 21  Inpatient Admission Date/Time: 7/23/23  6:37 PM  Discharge Date/Time: No discharge date for patient encounter. Admitting Diagnosis Code/Description:  Acute on chronic diastolic heart failure (HCC) [I50.33]  Chest pain [R07.9]  SOB (shortness of breath) [R06.02]  Acute on chronic heart failure ([address]) [I50.9]     UTILIZATION REVIEW CONTACT:  Russ Young Utilization   Network Utilization Review Department  Phone: [phone]  Fax: [phone]  Email: [email or web address]  Contact for approvals/pending authorizations, clinical reviews, and discharge. PHYSICIAN ADVISORY SERVICES:  Medical Necessity Denial & [employer] Review  Phone: [phone]  Fax: [phone]  Email: [email or web address]

## 2023-07-24 NOTE — RESPIRATORY THERAPY NOTE
RT Protocol Note  Suhail Le 66 y.o. male MRN: [id number]  Unit/Bed#: [facility] Encounter: [id number]    Assessment    Principal Problem:    Acute on chronic diastolic heart failure (HCC)  Active Problems:    Primary hypertension    Type 2 diabetes mellitus, without long-term current use of insulin (HCC)    Paroxysmal atrial fibrillation (HCC)    Stage 3a chronic kidney disease (HCC)    Severe persistent asthma without complication      Home Pulmonary Medications:  yes  Home Devices/Therapy: (P) BiPAP/CPAP    Past Medical History:   Diagnosis Date    Acute gastritis without hemorrhage     last assessed 3/24/17    Asthma     Atrial fibrillation (HCC)     Bilateral leg edema 2020    CHF (congestive heart failure) (HCC)     Coronary artery disease     Daytime sleepiness 2019    Diabetes mellitus (HCC)     Dyspnea on exertion 10/11/2021    Edema of both feet 2020    Gastric bypass status for obesity     Hyperlipidemia     Hypertension     Hypokalemia 2021    Impaired fasting glucose     last assessed 5/10/17    Knee sprain, bilateral 2018    Leg cramps 2022    Obesity     Viral gastroenteritis     last assessed 16     Social History     Socioeconomic History    Marital status: /Civil Union     Spouse name: None    Number of children: None    Years of education: None    Highest education level: None   Occupational History    None   Tobacco Use    Smoking status: Former     Types: Pipe, Cigars     Start date:      Quit date: 2021     Years since quittin.5    Smokeless tobacco: Never    Tobacco comments:     cigar once a day   Vaping Use    Vaping Use: Never used   Substance and Sexual Activity    Alcohol use:  Yes     Alcohol/week: 2.0 standard drinks of alcohol     Types: 2 Shots of liquor per week     Comment: social    Drug use: No    Sexual activity: Yes     Partners: Female     Birth control/protection: None   Other Topics Concern    None   Social History Narrative    None     Social Determinants of Health     Financial Resource Strain: Not on file   Food Insecurity: No Food Insecurity (4/12/2023)    Hunger Vital Sign     Worried About Running Out of Food in the Last Year: Never true     Ran Out of Food in the Last Year: Never true   Transportation Needs: No Transportation Needs (4/12/2023)    PRAPARE - Transportation     Lack of Transportation (Medical): No     Lack of Transportation (Non-Medical): No   Physical Activity: Not on file   Stress: Not on file   Social Connections: Not on file   Intimate Partner Violence: Not on file   Housing Stability: Low Risk  (4/12/2023)    Housing Stability Vital Sign     Unable to Pay for Housing in the Last Year: No     Number of Places Lived in the Last Year: 1     Unstable Housing in the Last Year: No       Subjective         Objective    Physical Exam:   Assessment Type: Assess only  General Appearance: Alert, Awake  Respiratory Pattern: Normal  Chest Assessment: Chest expansion symmetrical  Bilateral Breath Sounds: Clear, Diminished  R Breath Sounds: Diminished    Vitals:  Blood pressure 127/62, pulse 84, resp. rate 16, SpO2 99 %. Imaging and other studies: I have personally reviewed pertinent reports. Plan    Respiratory Plan: (P) No distress/Pulmonary history, Home Bronchodilator Patient pathway        Resp Comments: (P) Assessment of Respiratory protocol. Pt currently on R/A 99%, BS diminish and clear. Pt short of breath " he said My CHF is acting up again". Pt does takes nebulizers as needed. Hx of VICKY, has not used his cpap. It was recalled and Refusing to use one hear. Pt is not in distress. Will do per home regiment. Will keep Nebulizers prn.

## 2023-07-24 NOTE — PROGRESS NOTES
Patient is admitted to [address] Outpatient Advanced Heart Failure LCSW completed chart review and rounded with the HF Team. Pt does not know why he gained fluid weight. He stated that he's trying to be adherent to diet, fluid restrictions and regimen. Please enter referral if outpatient resources are needed in the future.

## 2023-07-24 NOTE — ASSESSMENT & PLAN NOTE
Wt Readings from Last 3 Encounters:   07/18/23 (!) 158 kg (349 lb)   05/01/23 (!) 149 kg (327 lb 6.4 oz)   04/18/23 (!) 147 kg (323 lb 13.7 oz)     · Patient with diastolic heart failure, presented to ER with ongoing dyspnea and weight gain. · dry weight is 332 pounds. Patient reported he weighed 341 pounds 7/22 in am   · Mildly elevated proBNP, chest x-ray consistent with vascular congestion. · Patient's home medication is Bumex 4 mg twice daily with metolazone, patient is compliant. · Continue on IV Lasix 100 mg twice daily  · Continue home medication Jardiance for now monitor Cr. · Intake, output, daily weight  · Heart failure consult recommending IV bumex gtt and IV lasix discontinued  · Kdur ordered 40 meq twice daily.

## 2023-07-24 NOTE — UTILIZATION REVIEW
Initial Clinical Review    Admission: Date/Time/Statement:   Admission Orders (From admission, onward)     Ordered        07/23/23 1837  INPATIENT ADMISSION  Once                      Orders Placed This Encounter   Procedures   • INPATIENT ADMISSION     Standing Status:   Standing     Number of Occurrences:   1     Order Specific Question:   Level of Care     Answer:   Med Surg [16]     Order Specific Question:   Estimated length of stay     Answer:   More than 2 Midnights     Order Specific Question:   Certification     Answer:   I certify that inpatient services are medically necessary for this patient for a duration of greater than two midnights. See H&P and MD Progress Notes for additional information about the patient's course of treatment. ED Arrival Information     Expected   -    Arrival   7/23/2023 13:39    Acuity   Urgent            Means of arrival   Ambulance    Escorted by   Soraya Spence)    Service   Hospitalist    Admission type   Emergency            Arrival complaint   sob           Chief Complaint   Patient presents with   • Chest Pain     C/o chest pain and SOB for about 1 week. Today c/o left arm numbness. Hx of COPD, CHF       Initial Presentation: 66 y.o. male to ED presents for ongoing dyspnea and wt gain for past several days. Pt recently visited heart failure office outpatient for the same complaints and his Bumex dose was increased, currently taking Bumex 4 mg twice daily and as needed metolazone. However despite taking Bumex 4 mg twice a day with metolazone as needed, patient continues to have dyspnea and weight gain. Pt reports his dry wt of 332 lbs, however this am wt was 341 lbs. Pt noted  to have bilateral lower extremity pitting edema. EKG reveals sinus rhythm. Mildly elevated proBNP. CXR consistent with pulm vascular congestion. PMH for chronic diastolic heart failure, paroxysmal A-fib, CKD 3, DM 2, HTN and Asthma.   Admit Inpatient level of care for Acute on chronic diastolic heart failure. Start Iv diuretics with Iv Lasix bid. Heart Failure consult. Continue home med. On exam; 3+ pitting edema bilaterally. Wt Readings from Last 3 Encounters:   07/18/23 (!) 158 kg (349 lb)   05/01/23 (!) 149 kg (327 lb 6.4 oz)   04/18/23 (!) 147 kg (323 lb 13.7 oz)       Date: 7/24   Day 2:   Heart Failure cons; Acute on chronic HFpEF, Stage C, NYHA III. Worsening SOB/TELLES, weight gain, and decreased urinary response. . Received 100 mg IV Lasix this AM. Escalate diuretic therapy and Start Bumex drip at 1 mg/hr. Hgb 8.2 mg/dL (goal Hgb >8 mg/dL given cardiac history). Continue to monitor. On exam; JVD. 1+ edema to BLLE. Progress notes; Continue Iv Bumex drip. Kdur 40 meq bid. Pt reports continuing with fluid in his stomach and legs. Feeling better. On exam; edema to BLLE.     ED Triage Vitals   Temperature Pulse Respirations Blood Pressure SpO2   07/24/23 0929 07/23/23 1346 07/23/23 1346 07/23/23 1346 07/23/23 1346   98.4 °F (36.9 °C) 72 18 141/68 97 %      Temp Source Heart Rate Source Patient Position - Orthostatic VS BP Location FiO2 (%)   07/24/23 0929 07/23/23 1346 07/23/23 1346 07/23/23 1346 --   Oral Monitor Lying Right arm       Pain Score       07/23/23 1625       No Pain          Wt Readings from Last 1 Encounters:   07/24/23 (!) 159 kg (350 lb 1.5 oz)     Additional Vital Signs:   07/24/23 1100 98.2 °F (36.8 °C) 71 19 135/89 107 96 % None (Room air) Sitting   07/24/23 1041 -- -- -- 135/89 71 -- -- --   07/24/23 0929 98.4 °F (36.9 °C) 81 20 152/66 95 94 % None (Room air) Sitting   07/24/23 0530 -- 78 22 121/57 82 97 % None (Room air) Lying   07/23/23 2300 -- 94 21 131/72 97 96 % None (Room air) Lying   07/23/23 2100 -- 82 20 128/56 80 97 % None (Room air) Lying     Pertinent Labs/Diagnostic Test Results:   XR chest 1 view portable   ED Interpretation by Michelle Elizondo DO (07/23 1740)   Mild vascular congestion, worse than previous CXR      Final Result by Carloz Zapata Ambar Dong MD (07/24 2379)      Slight increased pulmonary vascular            Workstation performed: [id number]               Results from last 7 days   Lab Units 07/24/23  0522 07/23/23  1430   WBC Thousand/uL 7.03 6.29   HEMOGLOBIN g/dL 8.2* 10.3*   HEMATOCRIT % 27.3* 34.1*   PLATELETS Thousands/uL 192 248   NEUTROS ABS Thousands/µL  --  4.83         Results from last 7 days   Lab Units 07/24/23  0522 07/23/23  1430   SODIUM mmol/L 138 138   POTASSIUM mmol/L 3.7 4.2   CHLORIDE mmol/L 104 107   CO2 mmol/L 26 24   ANION GAP mmol/L 8 7   BUN mg/dL 21 18   CREATININE mg/dL 1.52* 1.40*   EGFR ml/min/1.73sq m 50 55   CALCIUM mg/dL 7.7* 8.2*     Results from last 7 days   Lab Units 07/23/23  1430   AST U/L 45   ALT U/L 32   ALK PHOS U/L 107   TOTAL PROTEIN g/dL 7.1   ALBUMIN g/dL 3.2*   TOTAL BILIRUBIN mg/dL 0.37     Results from last 7 days   Lab Units 07/24/23  1037 07/24/23  0517 07/23/23  2155   POC GLUCOSE mg/dl 214* 160* 165*     Results from last 7 days   Lab Units 07/24/23  0522 07/23/23  1430   GLUCOSE RANDOM mg/dL 160* 216*       Results from last 7 days   Lab Units 07/23/23  1838 07/23/23  1627 07/23/23  1430   HS TNI 0HR ng/L  --   --  17   HS TNI 2HR ng/L  --  16  --    HSTNI D2 ng/L  --  -1  --    HS TNI 4HR ng/L 18  --   --    HSTNI D4 ng/L 1  --   --        Results from last 7 days   Lab Units 07/23/23  1430   BNP pg/mL 233*           ED Treatment:   Medication Administration from 07/23/2023 1339 to 07/24/2023 0851       Date/Time Order Dose Route Action     07/23/2023 1451 EDT furosemide (LASIX) 100 mg in dextrose 5 % 50 mL IVPB 100 mg Intravenous New Bag     07/23/2023 2021 EDT apixaban (ELIQUIS) tablet 5 mg 5 mg Oral Given     07/23/2023 2033 EDT budesonide-formoterol (SYMBICORT) 160-4.5 mcg/act inhaler 2 puff 2 puff Inhalation Given     07/23/2023 2156 EDT montelukast (SINGULAIR) tablet 10 mg 10 mg Oral Given     07/23/2023 2156 EDT pregabalin (LYRICA) capsule 50 mg 50 mg Oral Given     07/24/2023 0606 EDT insulin lispro (HumaLOG) 100 units/mL subcutaneous injection 1-6 Units 1 Units Subcutaneous Given     07/23/2023 2156 EDT insulin lispro (HumaLOG) 100 units/mL subcutaneous injection 1-5 Units 1 Units Subcutaneous Given        Past Medical History:   Diagnosis Date   • Acute gastritis without hemorrhage     last assessed 3/24/17   • Asthma    • Atrial fibrillation [facility])    • Bilateral leg edema 02/19/2020   • CHF (congestive heart failure) ([facility])    • Coronary artery disease    • Daytime sleepiness 07/17/2019   • Diabetes mellitus ([address])    • Dyspnea on exertion 10/11/2021   • Edema of both feet 01/23/2020   • Gastric bypass status for obesity    • Hyperlipidemia    • Hypertension    • Hypokalemia 11/14/2021   • Impaired fasting glucose     last assessed 5/10/17   • Knee sprain, bilateral 06/14/2018   • Leg cramps 06/16/2022   • Obesity    • Viral gastroenteritis     last assessed 11/4/16     Present on Admission:  • Paroxysmal atrial fibrillation ([facility])  • Primary hypertension  • Type 2 diabetes mellitus, without long-term current use of insulin ([facility])  • Severe persistent asthma without complication  • Acute on chronic diastolic heart failure (HCC)  • Stage 3a chronic kidney disease ([facility])      Admitting Diagnosis: Acute on chronic diastolic heart failure (HCC) [I50.33]  Chest pain [R07.9]  SOB (shortness of breath) [R06.02]  Acute on chronic heart failure (HCC) [I50.9]  Age/Sex: 66 y.o. male     Admission Orders:  Scheduled Medications:  apixaban, 5 mg, Oral, BID  atorvastatin, 10 mg, Oral, Daily  budesonide-formoterol, 2 puff, Inhalation, BID  Empagliflozin, 10 mg, Oral, Daily  insulin lispro, 1-5 Units, Subcutaneous, HS  insulin lispro, 1-6 Units, Subcutaneous, TID AC  metoprolol succinate, 50 mg, Oral, Daily  montelukast, 10 mg, Oral, HS  pantoprazole, 40 mg, Oral, Daily  potassium chloride, 40 mEq, Oral, BID  pregabalin, 50 mg, Oral, HS  umeclidinium, 1 puff, Inhalation, Daily    furosemide (LASIX) 100 mg in dextrose 5 % 50 mL IVPB  Dose: 100 mg  Freq: 2 times daily (diuretic) Route: IV  Last Dose: Stopped (07/24/23 1216)  Start: 07/23/23 1430 End: 07/24/23 1303    Continuous IV Infusions: None    bumetanide (BUMEX) 12.5 mg infusion 50 mL  Rate: 4 mL/hr Dose: 1 mg/hr  Freq: Continuous Route: IV  Last Dose: 1 mg/hr (07/25/23 0800)  Start: 07/24/23 1315    PRN Meds:  acetaminophen, 650 mg, Oral, Q6H PRN  albuterol, 2 puff, Inhalation, Q4H PRN  albuterol, 2.5 mg, Nebulization, Q4H PRN  ondansetron, 4 mg, Intravenous, Q6H PRN      Tele monitoring  IP CONSULT TO NUTRITION SERVICES  IP CONSULT TO HEART FAILURE SERVICE    Network Utilization Review Department  ATTENTION: Please call with any questions or concerns to [phone] and carefully listen to the prompts so that you are directed to the right person. All voicemails are confidential.  Xin Campbell all requests for admission clinical reviews, approved or denied determinations and any other requests to dedicated fax number below belonging to the campus where the patient is receiving treatment.  List of dedicated fax numbers for the Facilities:  [place] DENIALS (Administrative/Medical Necessity) [phone]   [address] [facility] (Maternity/NICU/Pediatrics) [phone]   [address] [place] [address] Drive [phone]   [facility] [address] [facility] [phone]   [address] [facility] [id number] [place] [address] [phone]   [address] [facility] [address] [facility] [address] [phone]

## 2023-07-24 NOTE — ASSESSMENT & PLAN NOTE
· follows with Dr. Mikayla Vaca as outpatient.   · No evidence of exacerbation  · Continue inhalers while inpatient

## 2023-07-25 ENCOUNTER — TELEPHONE (OUTPATIENT)
Dept: CARDIOLOGY CLINIC | Facility: CLINIC | Age: 58
End: 2023-07-25

## 2023-07-25 LAB
ANION GAP SERPL CALCULATED.3IONS-SCNC: 6 MMOL/L
BACTERIA UR QL AUTO: ABNORMAL /HPF
BILIRUB UR QL STRIP: NEGATIVE
BUN SERPL-MCNC: 25 MG/DL (ref 5–25)
CALCIUM SERPL-MCNC: 8.3 MG/DL (ref 8.3–10.1)
CHLORIDE SERPL-SCNC: 101 MMOL/L (ref 96–108)
CLARITY UR: CLEAR
CO2 SERPL-SCNC: 33 MMOL/L (ref 21–32)
COLOR UR: COLORLESS
CREAT SERPL-MCNC: 1.81 MG/DL (ref 0.6–1.3)
ERYTHROCYTE [DISTWIDTH] IN BLOOD BY AUTOMATED COUNT: 14.6 % (ref 11.6–15.1)
GFR SERPL CREATININE-BSD FRML MDRD: 40 ML/MIN/1.73SQ M
GLUCOSE SERPL-MCNC: 190 MG/DL (ref 65–140)
GLUCOSE SERPL-MCNC: 192 MG/DL (ref 65–140)
GLUCOSE SERPL-MCNC: 212 MG/DL (ref 65–140)
GLUCOSE SERPL-MCNC: 223 MG/DL (ref 65–140)
GLUCOSE SERPL-MCNC: 229 MG/DL (ref 65–140)
GLUCOSE UR STRIP-MCNC: ABNORMAL MG/DL
HCT VFR BLD AUTO: 34.3 % (ref 36.5–49.3)
HGB BLD-MCNC: 10.5 G/DL (ref 12–17)
HGB UR QL STRIP.AUTO: NEGATIVE
HYALINE CASTS #/AREA URNS LPF: ABNORMAL /LPF
KETONES UR STRIP-MCNC: NEGATIVE MG/DL
LEUKOCYTE ESTERASE UR QL STRIP: ABNORMAL
MCH RBC QN AUTO: 25.5 PG (ref 26.8–34.3)
MCHC RBC AUTO-ENTMCNC: 30.6 G/DL (ref 31.4–37.4)
MCV RBC AUTO: 84 FL (ref 82–98)
NITRITE UR QL STRIP: NEGATIVE
NON-SQ EPI CELLS URNS QL MICRO: ABNORMAL /HPF
PH UR STRIP.AUTO: 5 [PH]
PLATELET # BLD AUTO: 238 THOUSANDS/UL (ref 149–390)
PMV BLD AUTO: 10.5 FL (ref 8.9–12.7)
POTASSIUM SERPL-SCNC: 3.7 MMOL/L (ref 3.5–5.3)
PROT UR STRIP-MCNC: NEGATIVE MG/DL
RBC # BLD AUTO: 4.11 MILLION/UL (ref 3.88–5.62)
RBC #/AREA URNS AUTO: ABNORMAL /HPF
SODIUM SERPL-SCNC: 140 MMOL/L (ref 135–147)
SP GR UR STRIP.AUTO: 1.01 (ref 1–1.03)
UROBILINOGEN UR STRIP-ACNC: <2 MG/DL
WBC # BLD AUTO: 6.54 THOUSAND/UL (ref 4.31–10.16)
WBC #/AREA URNS AUTO: ABNORMAL /HPF

## 2023-07-25 PROCEDURE — 99232 SBSQ HOSP IP/OBS MODERATE 35: CPT | Performed by: STUDENT IN AN ORGANIZED HEALTH CARE EDUCATION/TRAINING PROGRAM

## 2023-07-25 PROCEDURE — 99233 SBSQ HOSP IP/OBS HIGH 50: CPT | Performed by: INTERNAL MEDICINE

## 2023-07-25 PROCEDURE — 82948 REAGENT STRIP/BLOOD GLUCOSE: CPT

## 2023-07-25 PROCEDURE — 85027 COMPLETE CBC AUTOMATED: CPT | Performed by: NURSE PRACTITIONER

## 2023-07-25 PROCEDURE — 94664 DEMO&/EVAL PT USE INHALER: CPT

## 2023-07-25 PROCEDURE — 81001 URINALYSIS AUTO W/SCOPE: CPT | Performed by: STUDENT IN AN ORGANIZED HEALTH CARE EDUCATION/TRAINING PROGRAM

## 2023-07-25 PROCEDURE — 80048 BASIC METABOLIC PNL TOTAL CA: CPT | Performed by: NURSE PRACTITIONER

## 2023-07-25 RX ORDER — INSULIN GLARGINE 100 [IU]/ML
5 INJECTION, SOLUTION SUBCUTANEOUS
Status: DISCONTINUED | OUTPATIENT
Start: 2023-07-25 | End: 2023-07-28 | Stop reason: HOSPADM

## 2023-07-25 RX ADMIN — INSULIN GLARGINE 5 UNITS: 100 INJECTION, SOLUTION SUBCUTANEOUS at 21:12

## 2023-07-25 RX ADMIN — METOPROLOL SUCCINATE 50 MG: 50 TABLET, EXTENDED RELEASE ORAL at 09:35

## 2023-07-25 RX ADMIN — INSULIN LISPRO 2 UNITS: 100 INJECTION, SOLUTION INTRAVENOUS; SUBCUTANEOUS at 11:48

## 2023-07-25 RX ADMIN — INSULIN LISPRO 2 UNITS: 100 INJECTION, SOLUTION INTRAVENOUS; SUBCUTANEOUS at 17:16

## 2023-07-25 RX ADMIN — POTASSIUM CHLORIDE 40 MEQ: 1500 TABLET, EXTENDED RELEASE ORAL at 09:35

## 2023-07-25 RX ADMIN — ATORVASTATIN CALCIUM 10 MG: 10 TABLET, FILM COATED ORAL at 17:15

## 2023-07-25 RX ADMIN — INSULIN LISPRO 2 UNITS: 100 INJECTION, SOLUTION INTRAVENOUS; SUBCUTANEOUS at 21:13

## 2023-07-25 RX ADMIN — POTASSIUM CHLORIDE 40 MEQ: 1500 TABLET, EXTENDED RELEASE ORAL at 17:15

## 2023-07-25 RX ADMIN — EMPAGLIFLOZIN 10 MG: 10 TABLET, FILM COATED ORAL at 09:36

## 2023-07-25 RX ADMIN — Medication 1 MG/HR: at 08:00

## 2023-07-25 RX ADMIN — APIXABAN 5 MG: 5 TABLET, FILM COATED ORAL at 17:15

## 2023-07-25 RX ADMIN — UMECLIDINIUM 1 PUFF: 62.5 AEROSOL, POWDER ORAL at 09:36

## 2023-07-25 RX ADMIN — BUDESONIDE AND FORMOTEROL FUMARATE DIHYDRATE 2 PUFF: 160; 4.5 AEROSOL RESPIRATORY (INHALATION) at 17:16

## 2023-07-25 RX ADMIN — Medication 1 MG/HR: at 21:13

## 2023-07-25 RX ADMIN — INSULIN LISPRO 2 UNITS: 100 INJECTION, SOLUTION INTRAVENOUS; SUBCUTANEOUS at 06:08

## 2023-07-25 RX ADMIN — APIXABAN 5 MG: 5 TABLET, FILM COATED ORAL at 09:35

## 2023-07-25 RX ADMIN — MONTELUKAST 10 MG: 10 TABLET, FILM COATED ORAL at 21:12

## 2023-07-25 RX ADMIN — PANTOPRAZOLE SODIUM 40 MG: 40 TABLET, DELAYED RELEASE ORAL at 09:35

## 2023-07-25 RX ADMIN — BUDESONIDE AND FORMOTEROL FUMARATE DIHYDRATE 2 PUFF: 160; 4.5 AEROSOL RESPIRATORY (INHALATION) at 09:36

## 2023-07-25 RX ADMIN — PREGABALIN 50 MG: 50 CAPSULE ORAL at 21:12

## 2023-07-25 NOTE — ASSESSMENT & PLAN NOTE
· Rate controlled on metoprolol, anticoagulated with Eliquis  · Heart rate regular. · Will need echocardiogram once volume optimized. · Cardiology following. · Monitor on telemetry.

## 2023-07-25 NOTE — CASE MANAGEMENT
Case Management Assessment & Discharge Planning Note    Patient name Chery November  Location [facility] 411/[facility] 959-21 MRN [id number]  : 1965 Date 2023       Current Admission Date: 2023  Current Admission Diagnosis:Acute on chronic diastolic heart failure [facility])   Patient Active Problem List    Diagnosis Date Noted   • Severe persistent asthma without complication    • Loose stools 2023   • Diabetic polyneuropathy associated with type 2 diabetes mellitus ([address]) 2022   • Stage 3a chronic kidney disease ([address]) 2022   • Paroxysmal atrial fibrillation ([address]) 2022   • Acute on chronic diastolic heart failure ([address]) 2021   • Severe persistent asthma 10/11/2021   • Hyponatremia 2021   • Cardiomyopathy ([address]) 2021   • HNP (herniated nucleus pulposus), lumbar 2021   • Foraminal stenosis of lumbar region 2021   • VICKY (obstructive sleep apnea)    • Mixed hyperlipidemia 2019   • Primary osteoarthritis of both knees 2018   • Irritable bowel syndrome with diarrhea 2018   • Primary hypertension 2018   • Type 2 diabetes mellitus, without long-term current use of insulin ([address]) 2018   • Vitamin B12 deficiency 2016   • Vitamin D deficiency 2016   • Morbid obesity due to excess calories ([address]) 2016   • Primary osteoarthritis of right knee 10/15/2013      LOS (days): 2  Geometric Mean LOS (GMLOS) (days):   Days to GMLOS:     OBJECTIVE:    Risk of Unplanned Readmission Score: 23.07         Current admission status: Inpatient       Preferred Pharmacy:   [facility] [address] [place] [address]  Phone: [phone] Fax: [phone]    Primary Care Provider: Martin Zheng MD    Primary Insurance: [employer]  Secondary Insurance:     ASSESSMENT:  [address] [facility], [address] Representative - Spouse   Primary Phone: [phone] (Home)  Mobile Phone: [phone]               Advance Directives  Does patient have a [address]?: No  Was patient offered paperwork?: Yes (declined)  Does patient currently have a Health Care decision maker?: Yes, please see Health Care Proxy section  Does patient have Advance Directives?: No  Was patient offered paperwork?: Yes (declined)  Primary Contact: wife Nabil Rhodes         Readmission Root Cause  30 Day Readmission: No    Patient Information  Admitted from[de-identified] Home  Mental Status: Alert  During Assessment patient was accompanied by: Not accompanied during assessment  Assessment information provided by[de-identified] Patient  Primary Caregiver: Self  Support Systems: Spouse/significant other, Daughter  [facility]: [address] [place] [address] do you live [address]?: [address] entry access options.  Select all that apply.: Stairs  Number of steps to enter home.: 6  Do the steps have railings?: Yes  Type of Current Residence: 3 story home  Upon entering residence, is there a bedroom on the main floor (no further steps)?: No  A bedroom is located on the following floor levels of residence (select all that apply):: 2nd Floor  Upon entering residence, is there a bathroom on the main floor (no further steps)?: No  Indicate which floors of current residence have a bathroom (select all the apply):: 2nd Floor  Number of steps to 2nd floor from main floor: One Flight  In the last 12 months, was there a time when you were not able to pay the mortgage or rent on time?: No  In the last 12 months, how many places have you lived?: 1  In the last 12 months, was there a time when you did not have a steady place to sleep or slept in a shelter (including now)?: No  Homeless/housing insecurity resource given?: N/A  Living Arrangements: Lives w/ Spouse/significant other, Lives w/ Daughter  Is patient a ?: No    Activities of Daily Living Prior to Admission  Functional Status: Independent  Completes ADLs independently?: Yes  Ambulates independently?: Yes  Does patient use assisted devices?: No  Does patient currently own DME?: Yes  What DME does the patient currently own?: Nebulizer  Does patient have a history of Outpatient Therapy (PT/OT)?: No  Does the patient have a history of Short-Term Rehab?: No  Does patient have a history of HHC?: No  Does patient currently have [address]?: No         Patient Information Continued  Income Source: Unemployed  Does patient have prescription coverage?: Yes  Within the past 12 months, you worried that your food would run out before you got the money to buy more.: Never true  Within the past 12 months, the food you bought just didn't last and you didn't have money to get more.: Never true  Food insecurity resource given?: N/A  Does patient receive dialysis treatments?: No  Does patient have a history of substance abuse?: No  Does patient have a history of Mental Health Diagnosis?: No         Means of Transportation  Means of Transport to Appts[de-identified] Drives Self  In the past 12 months, has lack of transportation kept you from medical appointments or from getting medications?: No  In the past 12 months, has lack of transportation kept you from meetings, work, or from getting things needed for daily living?: No  Was application for public transport provided?: N/A        DISCHARGE DETAILS:    Discharge planning discussed with[de-identified] pt--- awaiting PT/OT evals  Freedom of Choice: Yes     CM contacted family/caregiver?: No- see comments (alert and oriented)  Were Treatment Team discharge recommendations reviewed with patient/caregiver?: Yes  Did patient/caregiver verbalize understanding of patient care needs?: Yes  Were patient/caregiver advised of the risks associated with not following Treatment Team discharge recommendations?: Yes    Contacts  Contact Method: Phone  Reason/Outcome: Continuity of Care, Emergency Contact, Discharge Planning              Other Referral/Resources/Interventions Provided:  Programs[de-identified] CHF    Would you like to participate in our [address] BERD service program?  : No - Declined    Treatment Team Recommendation: Home  Discharge Destination Plan[de-identified] Home  Transport at Discharge : Family                                      Additional Comments: 66yo male admitted with a/c diastolic heart failure, chest pain, SOB, wt. gain. On Bumex gtt. Morbid obesity. Has Rx coverage. Pt asked CM to call [employer] and ask not to have his electric turned off as he is in the hospital.  CM called [employer], they faxed a form to me and Dr. Rosalind Angel completed. Form faxed back to [employer] and form given to patient. Pt. awaiting PT/OT leatha. Uses no DME. Lives with wife and daughter.

## 2023-07-25 NOTE — ASSESSMENT & PLAN NOTE
Lab Results   Component Value Date    EGFR 40 07/25/2023    EGFR 50 07/24/2023    EGFR 55 07/23/2023    CREATININE 1.81 (H) 07/25/2023    CREATININE 1.52 (H) 07/24/2023    CREATININE 1.40 (H) 07/23/2023     · Baseline appears to be 1.5-1.7; patient currently at baseline, creatinine 1.81, likely in setting of IV diuresis.   · Monitor renal function while on IV diuresis  · Monitor for electrolytes, replete potassium as needed

## 2023-07-25 NOTE — TELEPHONE ENCOUNTER
Was going to call pt because Cardiomems readings were up the  3 days. Goal is 15 pad was 21 on 7/21, 25 on 7/22, 21 on 7/23. He went to hospital on 7/23/23 for dyspnea and wt gain.   Presently still in hospital.

## 2023-07-25 NOTE — PROGRESS NOTES
[address] [facility]  Progress Note  Name: Ck Venegas  MRN: [id number]  [facility] I Date of Admission: 7/23/2023   Date of Service: 7/25/2023 I Hospital Day: 2    Assessment/Plan   * Acute on chronic diastolic heart failure [facility])  Assessment & Plan  Wt Readings from Last 3 Encounters:   07/25/23 (!) 156 kg (344 lb 2.2 oz)   07/18/23 (!) 158 kg (349 lb)   05/01/23 (!) 149 kg (327 lb 6.4 oz)     · Patient with diastolic heart failure, presented to ER with ongoing dyspnea and weight gain. · dry weight is 332 pounds. Patient reported he weighed 341 pounds 7/22 in am   · Mildly elevated proBNP, chest x-ray consistent with vascular congestion. · Patient's home medication is Bumex 4 mg twice daily with metolazone, patient is compliant. · Patient was initially started on IV Lasix but has been transitioned to Bumex gtt. per heart failure recommendations. · Continue home medication Jardiance for now monitor Cr.   · I continue to monitor daily weights, ins and outs. · Monitor electrolyte and replete to manage potassium above 4 and magnesium above 2. Severe persistent asthma without complication  Assessment & Plan  · follows with Dr. Petra Salter as outpatient. · No evidence of exacerbation  · Continue inhalers while inpatient     Stage 3a chronic kidney disease [facility])  Assessment & Plan  Lab Results   Component Value Date    EGFR 40 07/25/2023    EGFR 50 07/24/2023    EGFR 55 07/23/2023    CREATININE 1.81 (H) 07/25/2023    CREATININE 1.52 (H) 07/24/2023    CREATININE 1.40 (H) 07/23/2023     · Baseline appears to be 1.5-1.7; patient currently at baseline, creatinine 1.81, likely in setting of IV diuresis. · Monitor renal function while on IV diuresis  · Monitor for electrolytes, replete potassium as needed    Paroxysmal atrial fibrillation (HCC)  Assessment & Plan  · Rate controlled on metoprolol, anticoagulated with Eliquis  · Heart rate regular.   · Will need echocardiogram once volume optimized. · Cardiology following. · Monitor on telemetry. Type 2 diabetes mellitus, without long-term current use of insulin [facility])  Assessment & Plan  Start sliding scale    Recent Labs     07/24/23  1613 07/24/23  2044 07/25/23  0548 07/25/23  1043   POCGLU 181* 212* 190* 223*       Blood Sugar Average: Last 72 hrs:  (P) 192.3250163825413811   · Hold home medication Januvia  · Jardiance continued  · Continue SSI and blood glucose monitoring. Will add Lantus 5 units nightly. Primary hypertension  Assessment & Plan  · BP acceptable, continue to monitor blood pressure while on IV Lasix  · Continue home medication metoprolol  · Routine vitals per unit                VTE Pharmacologic Prophylaxis: VTE Score: 4 Moderate Risk (Score 3-4) - Pharmacological DVT Prophylaxis Ordered: apixaban (Eliquis). Patient Centered Rounds: I performed bedside rounds with nursing staff today. Discussions with Specialists or Other Care Team Provider: CM    Education and Discussions with Family / Patient: Patient declined call to . Total Time Spent on Date of Encounter in care of patient: 35 minutes This time was spent on one or more of the following: performing physical exam; counseling and coordination of care; obtaining or reviewing history; documenting in the medical record; reviewing/ordering tests, medications or procedures; communicating with other healthcare professionals and discussing with patient's family/caregivers. Current Length of Stay: 2 day(s)  Current Patient Status: Inpatient   Certification Statement: The patient will continue to require additional inpatient hospital stay due to Continues to be on Bumex GGT. Discharge Plan: Anticipate discharge in 48-72 hrs to discharge location to be determined pending rehab evaluations. Code Status: Level 1 - Full Code    Subjective:   Patient examined at bedside, denies any active complaints.   Reports feeling slightly better and has been diuresing very well since being started on Bumex gtt. Objective:     Vitals:   Temp (24hrs), Av.2 °F (36.8 °C), Min:97.8 °F (36.6 °C), Max:98.7 °F (37.1 °C)    Temp:  [97.8 °F (36.6 °C)-98.7 °F (37.1 °C)] 98.7 °F (37.1 °C)  HR:  [81-86] 82  Resp:  [14-18] 14  BP: (116-135)/(59-79) 116/79  SpO2:  [95 %-96 %] 96 %  Body mass index is 45.4 kg/m². Input and Output Summary (last 24 hours): Intake/Output Summary (Last 24 hours) at 2023 1542  Last data filed at 2023 1540  Gross per 24 hour   Intake 252.47 ml   Output 5600 ml   Net -5347.53 ml       Physical Exam:   Physical Exam  Constitutional:       Appearance: He is obese. HENT:      Head: Normocephalic and atraumatic. Mouth/Throat:      Mouth: Mucous membranes are moist.      Pharynx: Oropharynx is clear. Eyes:      Conjunctiva/sclera: Conjunctivae normal.      Pupils: Pupils are equal, round, and reactive to light. Cardiovascular:      Rate and Rhythm: Normal rate and regular rhythm. Pulses: Normal pulses. Heart sounds: Normal heart sounds. Pulmonary:      Effort: Pulmonary effort is normal.      Comments: Decreased breath sounds bilaterally probably due to body habitus however was not able to hear any wheezes or rhonchi. Abdominal:      General: There is distension. Palpations: Abdomen is soft. Tenderness: There is no abdominal tenderness. Musculoskeletal:      Cervical back: Normal range of motion and neck supple. Right lower leg: Edema present. Left lower leg: Edema present. Skin:     General: Skin is warm and dry. Neurological:      General: No focal deficit present. Mental Status: He is alert and oriented to person, place, and time.    Psychiatric:         Mood and Affect: Mood normal.         Behavior: Behavior normal.          Additional Data:     Labs:  Results from last 7 days   Lab Units 23  0507 23  0522 23  1430   WBC Thousand/uL 6.54   < > 6.29   HEMOGLOBIN g/dL 10.5*   < > 10.3*   HEMATOCRIT % 34.3*   < > 34.1*   PLATELETS Thousands/uL 238   < > 248   NEUTROS PCT %  --   --  77*   LYMPHS PCT %  --   --  13*   MONOS PCT %  --   --  9   EOS PCT %  --   --  0    < > = values in this interval not displayed. Results from last 7 days   Lab Units 07/25/23  0507 07/24/23  0522 07/23/23  1430   SODIUM mmol/L 140   < > 138   POTASSIUM mmol/L 3.7   < > 4.2   CHLORIDE mmol/L 101   < > 107   CO2 mmol/L 33*   < > 24   BUN mg/dL 25   < > 18   CREATININE mg/dL 1.81*   < > 1.40*   ANION GAP mmol/L 6   < > 7   CALCIUM mg/dL 8.3   < > 8.2*   ALBUMIN g/dL  --   --  3.2*   TOTAL BILIRUBIN mg/dL  --   --  0.37   ALK PHOS U/L  --   --  107   ALT U/L  --   --  32   AST U/L  --   --  45   GLUCOSE RANDOM mg/dL 192*   < > 216*    < > = values in this interval not displayed. Results from last 7 days   Lab Units 07/25/23  1043 07/25/23  0548 07/24/23  2044 07/24/23  1613 07/24/23  1037 07/24/23  0517 07/23/23  2155   POC GLUCOSE mg/dl 223* 190* 212* 181* 214* 160* 165*               Lines/Drains:  Invasive Devices     Peripheral Intravenous Line  Duration           Peripheral IV 07/24/23 Proximal;Right;Ventral (anterior) Forearm 1 day                  Telemetry:  Telemetry Orders (From admission, onward)             24 Hour Telemetry Monitoring  Continuous x 24 Hours (Telem)        Question:  Reason for 24 Hour Telemetry  Answer:  Decompensated CHF- and any one of the following: continuous diuretic infusion or total diuretic dose >200 mg daily, associated electrolyte derangement (I.e. K < 3.0), ionotropic drip (continuous infusion), hx of ventricular arrhythmia, or new EF < 35%                 Telemetry Reviewed: Normal Sinus Rhythm  Indication for Continued Telemetry Use: Acute CHF on >200 mg lasix/day or equivalent dose or with new reduced EF.               Imaging: Reviewed radiology reports from this admission including: chest xray    Recent Cultures (last 7 days):         Last 24 Hours Medication List:   Current Facility-Administered Medications   Medication Dose Route Frequency Provider Last Rate   • acetaminophen  650 mg Oral Q6H PRN Devang Ingram MD     • albuterol  2 puff Inhalation Q4H PRN Fco Foster MD     • albuterol  2.5 mg Nebulization Q4H PRN Fco Foster MD     • apixaban  5 mg Oral BID Fco Foster MD     • atorvastatin  10 mg Oral Daily Fco Foster MD     • budesonide-formoterol  2 puff Inhalation BID Fco Foster MD     • bumetanide (BUMEX) 12.5 mg infusion 50 mL  1 mg/hr Intravenous Continuous Javed Chavez PA-C 1 mg/hr (07/25/23 0800)   • Empagliflozin  10 mg Oral Daily Fco Foster MD     • insulin glargine  5 Units Subcutaneous HS Subha Baker MD     • insulin lispro  1-5 Units Subcutaneous HS Fco Foster MD     • insulin lispro  1-6 Units Subcutaneous TID Symone Hernandez MD     • metoprolol succinate  50 mg Oral Daily Fco Foster MD     • montelukast  10 mg Oral HS Fco Foster MD     • ondansetron  4 mg Intravenous Q6H PRN Fco Foster MD     • pantoprazole  40 mg Oral Daily Fco Foster MD     • potassium chloride  40 mEq Oral BID Javed Chavez PA-C     • pregabalin  50 mg Oral HS Fco Foster MD     • umeclidinium  1 puff Inhalation Daily Fco Foster MD          Today, Patient Was Seen By: Subha Baker MD    **Please Note: This note may have been constructed using a voice recognition system. **

## 2023-07-25 NOTE — ASSESSMENT & PLAN NOTE
· BP acceptable, continue to monitor blood pressure while on IV Lasix  · Continue home medication metoprolol  · Routine vitals per unit

## 2023-07-25 NOTE — PROGRESS NOTES
Pastoral Care Progress Note    2023  Patient: Buck Eaton : 1965  Admission Date & Time: 2023 1340  MRN: [id number] CSN: [id number]        Introduced myself/pastoral care to PT, chatted a bit about how he was doing, offered him emotional and spiritual support. He welcomed me to pray for his healing.  remains available.

## 2023-07-25 NOTE — ASSESSMENT & PLAN NOTE
Wt Readings from Last 3 Encounters:   07/25/23 (!) 156 kg (344 lb 2.2 oz)   07/18/23 (!) 158 kg (349 lb)   05/01/23 (!) 149 kg (327 lb 6.4 oz)     · Patient with diastolic heart failure, presented to ER with ongoing dyspnea and weight gain. · dry weight is 332 pounds. Patient reported he weighed 341 pounds 7/22 in am   · Mildly elevated proBNP, chest x-ray consistent with vascular congestion. · Patient's home medication is Bumex 4 mg twice daily with metolazone, patient is compliant. · Patient was initially started on IV Lasix but has been transitioned to Bumex gtt. per heart failure recommendations. · Continue home medication Jardiance for now monitor Cr.   · I continue to monitor daily weights, ins and outs. · Monitor electrolyte and replete to manage potassium above 4 and magnesium above 2.

## 2023-07-25 NOTE — ASSESSMENT & PLAN NOTE
Start sliding scale    Recent Labs     07/24/23  1613 07/24/23  2044 07/25/23  0548 07/25/23  1043   POCGLU 181* 212* 190* 223*       Blood Sugar Average: Last 72 hrs:  (P) [id number]   · Hold home medication Januvia  · Jardiance continued  · Continue SSI and blood glucose monitoring. Will add Lantus 5 units nightly.

## 2023-07-25 NOTE — PROGRESS NOTES
Advanced Heart Failure / Pulmonary Hypertension Service Progress Note    Alexander Sebastian 66 y.o. male   MRN: [id number]  Unit/Bed#: [facility] 411-01; Encounter: [id number]    Assessment:  Principal Problem:    Acute on chronic diastolic heart failure (HCC)  Active Problems:    Primary hypertension    Type 2 diabetes mellitus, without long-term current use of insulin (HCC)    Paroxysmal atrial fibrillation (HCC)    Stage 3a chronic kidney disease ([address])    Severe persistent asthma without complication      Subjective:   Alexander Sebastian is a 53-year-old man with PMH as below who presented [facility]23 with worsening SOB/TELLES, weight gain, and decreased urinary response.  (no priors). CXR with "slight increased pulmonary vascular congestion." Started on IV Lasix, and cardiology consulted.      Patient seen and examined. No significant events overnight. Reports robust response to Bumex drip. Down 6 lbs today. Feels that he still has more fluid to go. Denies PND and orthopnea overnight. Planning to reach out to [facility] regarding CPAP equipment once discharged. Objective: Intake/ Output: 276.2 mL / 5275 mL. Intake not accurate. Weight: 344 lbs (350 lbs on 07/24). Telemetry: NSR, rates in 90s. Today's Plan:  • Continue Bumex drip today. • Consider addition of MRA this admission if appropriate. • Will plan repeat TTE once euvolemic (last TTE in 11/2021). • Potassium 3.7 this AM. Continue current supplementation. • Hemoglobin 10.5 mg/dL this AM (goal Hgb >8 mg/dL given cardiac history). Continue to monitor. Plan:  Acute on chronic HFpEF; LVEF 55%; LVIDd 6.0 cm; NYHA III; ACC/AHA Stage C              Etiology: Afib, HTN, obesity phenotype.              TTE 11/21/2021: LVEF 55%. LVIDd 6.0 cm. Grade 1 DD. Normal RV. Trace TR.             LHC 09/06/2022: no obstructive CAD.     Pharmacotherapies / Neurohormonal Blockade:  --Beta Blocker: metoprolol succinate 50 mg daily.   --Monia Go / ACEi / ARB: No.  --Aldosterone Antagonist: No.  --SGLT2 Inhibitor: empagliflozin 10 mg daily. --Home Diuretic: Bumex 4 mg BID with potassium 40 mEq BID with PRN metolazone 2.5 mg.   --Inpatient Diuretic: IV Bumex 1 mg/hr with potassium 40 mEq BID.                 Advanced Therapies:              --CardioMEMS: implanted 03/09/2022. Goal PAd of 15. Atrial fibrillation, parosxysmal              [id number] = 3 (HF, HTN, DM).             Anticoagulation on Eliquis.             Rate control: BB as above.              Rhythm control: No.     Chronic kidney disease, stage IIIb              Baseline creatinine of 1.5-2.0. Today, creatinine of 1.81 (1.52 on 07/24).    Hypertension  Asthma, severe persistent: follows with Dr. Norma Lewis as outpatient. Obstructive sleep apnea: without CPAP and equipment for ~12 months. Planning to follow-up with device company s/p discharge in attempt to get CPAP machine. Diabetes mellitus, type II  History of gastric bypass (Samuel-en-Y) surgery   History of tobacco abuse     Vitals:   Blood pressure 119/77, pulse 86, temperature 98.2 °F (36.8 °C), temperature source Oral, resp. rate 17, height 6' 1" (1.854 m), weight (!) 156 kg (344 lb 2.2 oz), SpO2 95 %. I/O last 3 completed shifts: In: 756.2 [P.O.:660; I.V.:46.2;  IV Piggyback:50]  Out: 5381 [Urine:5625]    Weight (last 2 days)     Date/Time Weight    07/25/23 0600 156 (344.14)    07/24/23 0600 159 (350.09)        Wt Readings from Last 10 Encounters:   07/25/23 (!) 156 kg (344 lb 2.2 oz)   07/18/23 (!) 158 kg (349 lb)   05/01/23 (!) 149 kg (327 lb 6.4 oz)   04/18/23 (!) 147 kg (323 lb 13.7 oz)   04/10/23 (!) 154 kg (339 lb 11.2 oz)   03/21/23 (!) 151 kg (332 lb 0.2 oz)   03/15/23 (!) 151 kg (333 lb)   12/28/22 (!) 148 kg (326 lb)   12/27/22 (!) 147 kg (324 lb)   12/20/22 (!) 147 kg (324 lb 9.6 oz)     Vitals:    07/25/23 0256 07/25/23 0600 07/25/23 0649 07/25/23 1100   BP: 119/69  122/68 119/77   BP Location: Left arm Left arm Left arm   Pulse: 83  81 86   Resp: 15  18 17   Temp: 98.1 °F (36.7 °C)  98.1 °F (36.7 °C) 98.2 °F (36.8 °C)   TempSrc: Oral  Oral Oral   SpO2: 96%  96% 95%   Weight:  (!) 156 kg (344 lb 2.2 oz)     Height:  6' 1" (1.854 m)         Physical Exam  Vitals reviewed. Constitutional:       General: He is awake. He is not in acute distress. Appearance: Normal appearance. He is well-developed and overweight. He is not toxic-appearing or diaphoretic. HENT:      Head: Normocephalic. Nose: Nose normal.      Mouth/Throat:      Mouth: Mucous membranes are moist.   Eyes:      General: No scleral icterus. Conjunctiva/sclera: Conjunctivae normal.   Neck:      Vascular: JVD present. Trachea: No tracheal deviation. Cardiovascular:      Rate and Rhythm: Normal rate and regular rhythm. No extrasystoles are present. Pulmonary:      Effort: Pulmonary effort is normal. No tachypnea, bradypnea or respiratory distress. Breath sounds: Normal air entry. No decreased air movement. Decreased breath sounds (distant) present. No wheezing or rhonchi. Abdominal:      General: Bowel sounds are normal. There is distension. Palpations: Abdomen is soft. Tenderness: There is no abdominal tenderness. Musculoskeletal:      Cervical back: Neck supple. Right lower le+ Edema present. Left lower le+ Edema present. Skin:     General: Skin is warm and dry. Coloration: Skin is pale. Skin is not jaundiced. Neurological:      General: No focal deficit present. Mental Status: He is alert and oriented to person, place, and time. Psychiatric:         Attention and Perception: Attention normal.         Mood and Affect: Mood and affect normal.         Speech: Speech normal.         Behavior: Behavior normal. Behavior is cooperative. Thought Content:  Thought content normal.       Lines/Drains/Airways     Active Status     None                Current Facility-Administered Medications:   •  acetaminophen (TYLENOL) tablet 650 mg, 650 mg, Oral, Q6H PRN, Danita Garcia MD  •  albuterol (PROVENTIL HFA,VENTOLIN HFA) inhaler 2 puff, 2 puff, Inhalation, Q4H PRN, Danita Garcia MD  •  albuterol inhalation solution 2.5 mg, 2.5 mg, Nebulization, Q4H PRN, Danita Garcia MD  •  apixaban (ELIQUIS) tablet 5 mg, 5 mg, Oral, BID, Devang Ingram MD, 5 mg at 07/25/23 0935  •  atorvastatin (LIPITOR) tablet 10 mg, 10 mg, Oral, Daily, Devang Ingram MD, 10 mg at 07/24/23 1733  •  budesonide-formoterol (SYMBICORT) 160-4.5 mcg/act inhaler 2 puff, 2 puff, Inhalation, BID, Danita Garcia MD, 2 puff at 07/25/23 0936  •  bumetanide (BUMEX) 12.5 mg infusion 50 mL, 1 mg/hr, Intravenous, Continuous, Jackson Downey PA-C, Last Rate: 4 mL/hr at 07/25/23 0800, 1 mg/hr at 07/25/23 0800  •  Empagliflozin (JARDIANCE) tablet 10 mg, 10 mg, Oral, Daily, Devang Ingram MD, 10 mg at 07/25/23 0936  •  insulin lispro (HumaLOG) 100 units/mL subcutaneous injection 1-5 Units, 1-5 Units, Subcutaneous, HS, Devang Ingram MD, 2 Units at 07/24/23 2103  •  insulin lispro (HumaLOG) 100 units/mL subcutaneous injection 1-6 Units, 1-6 Units, Subcutaneous, TID AC, 2 Units at 07/25/23 1148 **AND** Fingerstick Glucose (POCT), , , TID AC, Devang Ingram MD  •  metoprolol succinate (TOPROL-XL) 24 hr tablet 50 mg, 50 mg, Oral, Daily, Devang Ingram MD, 50 mg at 07/25/23 0935  •  montelukast (SINGULAIR) tablet 10 mg, 10 mg, Oral, HS, Devang Ingram MD, 10 mg at 07/24/23 2100  •  ondansetron (ZOFRAN) injection 4 mg, 4 mg, Intravenous, Q6H PRN, Danita Garcia MD  •  pantoprazole (PROTONIX) EC tablet 40 mg, 40 mg, Oral, Daily, Devang Ingram MD, 40 mg at 07/25/23 0935  •  potassium chloride (K-DUR,KLOR-CON) CR tablet 40 mEq, 40 mEq, Oral, BID, Jackson Downey PA-C, 40 mEq at 07/25/23 0935  •  pregabalin (LYRICA) capsule 50 mg, 50 mg, Oral, HS, Devang Ingram MD, 50 mg at 07/24/23 2100  •  umeclidinium 62.5 mcg/actuation inhaler AEPB 1 puff, 1 puff, Inhalation, Daily, Tasha Goldsmith MD, 1 puff at 07/25/23 0936    Labs & Results:      Results from last 7 days   Lab Units 07/25/23  0507 07/24/23  0522 07/23/23  1430   WBC Thousand/uL 6.54 7.03 6.29   HEMOGLOBIN g/dL 10.5* 8.2* 10.3*   HEMATOCRIT % 34.3* 27.3* 34.1*   PLATELETS Thousands/uL 238 192 248         Results from last 7 days   Lab Units 07/25/23  0507 07/24/23  0522 07/23/23  1430   POTASSIUM mmol/L 3.7 3.7 4.2   CHLORIDE mmol/L 101 104 107   CO2 mmol/L 33* 26 24   BUN mg/dL 25 21 18   CREATININE mg/dL 1.81* 1.52* 1.40*   CALCIUM mg/dL 8.3 7.7* 8.2*   ALK PHOS U/L  --   --  107   ALT U/L  --   --  32   AST U/L  --   --  [address] [place], [place] [address]KARINE

## 2023-07-25 NOTE — MALNUTRITION/BMI
This medical record reflects one or more clinical indicators suggestive of morbid obesity. BMI Findings:  Adult BMI Classifications: Morbid Obesity 45-49.9        Body mass index is 45.4 kg/m². See Nutrition note dated 7/25/23 for additional details. Completed nutrition assessment is viewable in the nutrition documentation.

## 2023-07-25 NOTE — ASSESSMENT & PLAN NOTE
· follows with Dr. Cassi Fraire as outpatient.   · No evidence of exacerbation  · Continue inhalers while inpatient

## 2023-07-26 LAB
ANION GAP SERPL CALCULATED.3IONS-SCNC: 6 MMOL/L
BUN SERPL-MCNC: 32 MG/DL (ref 5–25)
CALCIUM SERPL-MCNC: 8.2 MG/DL (ref 8.3–10.1)
CHLORIDE SERPL-SCNC: 99 MMOL/L (ref 96–108)
CO2 SERPL-SCNC: 35 MMOL/L (ref 21–32)
CREAT SERPL-MCNC: 2.17 MG/DL (ref 0.6–1.3)
ERYTHROCYTE [DISTWIDTH] IN BLOOD BY AUTOMATED COUNT: 14.6 % (ref 11.6–15.1)
GFR SERPL CREATININE-BSD FRML MDRD: 32 ML/MIN/1.73SQ M
GLUCOSE SERPL-MCNC: 186 MG/DL (ref 65–140)
GLUCOSE SERPL-MCNC: 207 MG/DL (ref 65–140)
GLUCOSE SERPL-MCNC: 226 MG/DL (ref 65–140)
GLUCOSE SERPL-MCNC: 248 MG/DL (ref 65–140)
GLUCOSE SERPL-MCNC: 251 MG/DL (ref 65–140)
HCT VFR BLD AUTO: 34.1 % (ref 36.5–49.3)
HGB BLD-MCNC: 10.6 G/DL (ref 12–17)
MAGNESIUM SERPL-MCNC: 2.3 MG/DL (ref 1.6–2.6)
MCH RBC QN AUTO: 25.6 PG (ref 26.8–34.3)
MCHC RBC AUTO-ENTMCNC: 31.1 G/DL (ref 31.4–37.4)
MCV RBC AUTO: 82 FL (ref 82–98)
PLATELET # BLD AUTO: 232 THOUSANDS/UL (ref 149–390)
PMV BLD AUTO: 10.1 FL (ref 8.9–12.7)
POTASSIUM SERPL-SCNC: 3.5 MMOL/L (ref 3.5–5.3)
RBC # BLD AUTO: 4.14 MILLION/UL (ref 3.88–5.62)
SODIUM SERPL-SCNC: 140 MMOL/L (ref 135–147)
WBC # BLD AUTO: 6.74 THOUSAND/UL (ref 4.31–10.16)

## 2023-07-26 PROCEDURE — 83735 ASSAY OF MAGNESIUM: CPT | Performed by: STUDENT IN AN ORGANIZED HEALTH CARE EDUCATION/TRAINING PROGRAM

## 2023-07-26 PROCEDURE — 82948 REAGENT STRIP/BLOOD GLUCOSE: CPT

## 2023-07-26 PROCEDURE — 80048 BASIC METABOLIC PNL TOTAL CA: CPT | Performed by: STUDENT IN AN ORGANIZED HEALTH CARE EDUCATION/TRAINING PROGRAM

## 2023-07-26 PROCEDURE — 94664 DEMO&/EVAL PT USE INHALER: CPT

## 2023-07-26 PROCEDURE — 85027 COMPLETE CBC AUTOMATED: CPT | Performed by: STUDENT IN AN ORGANIZED HEALTH CARE EDUCATION/TRAINING PROGRAM

## 2023-07-26 PROCEDURE — 99232 SBSQ HOSP IP/OBS MODERATE 35: CPT | Performed by: STUDENT IN AN ORGANIZED HEALTH CARE EDUCATION/TRAINING PROGRAM

## 2023-07-26 PROCEDURE — 99232 SBSQ HOSP IP/OBS MODERATE 35: CPT | Performed by: INTERNAL MEDICINE

## 2023-07-26 RX ORDER — POTASSIUM CHLORIDE 20 MEQ/1
60 TABLET, EXTENDED RELEASE ORAL ONCE
Status: COMPLETED | OUTPATIENT
Start: 2023-07-26 | End: 2023-07-26

## 2023-07-26 RX ORDER — BUMETANIDE 2 MG/1
6 TABLET ORAL
Status: DISCONTINUED | OUTPATIENT
Start: 2023-07-26 | End: 2023-07-27

## 2023-07-26 RX ADMIN — METOPROLOL SUCCINATE 50 MG: 50 TABLET, EXTENDED RELEASE ORAL at 09:44

## 2023-07-26 RX ADMIN — MONTELUKAST 10 MG: 10 TABLET, FILM COATED ORAL at 21:20

## 2023-07-26 RX ADMIN — ATORVASTATIN CALCIUM 10 MG: 10 TABLET, FILM COATED ORAL at 18:18

## 2023-07-26 RX ADMIN — Medication 1 MG/HR: at 09:43

## 2023-07-26 RX ADMIN — BUMETANIDE 6 MG: 2 TABLET ORAL at 17:23

## 2023-07-26 RX ADMIN — INSULIN LISPRO 2 UNITS: 100 INJECTION, SOLUTION INTRAVENOUS; SUBCUTANEOUS at 21:18

## 2023-07-26 RX ADMIN — POTASSIUM CHLORIDE 40 MEQ: 1500 TABLET, EXTENDED RELEASE ORAL at 18:18

## 2023-07-26 RX ADMIN — PREGABALIN 50 MG: 50 CAPSULE ORAL at 21:20

## 2023-07-26 RX ADMIN — INSULIN GLARGINE 5 UNITS: 100 INJECTION, SOLUTION SUBCUTANEOUS at 21:19

## 2023-07-26 RX ADMIN — BUDESONIDE AND FORMOTEROL FUMARATE DIHYDRATE 2 PUFF: 160; 4.5 AEROSOL RESPIRATORY (INHALATION) at 18:18

## 2023-07-26 RX ADMIN — UMECLIDINIUM 1 PUFF: 62.5 AEROSOL, POWDER ORAL at 09:45

## 2023-07-26 RX ADMIN — APIXABAN 5 MG: 5 TABLET, FILM COATED ORAL at 09:44

## 2023-07-26 RX ADMIN — INSULIN LISPRO 1 UNITS: 100 INJECTION, SOLUTION INTRAVENOUS; SUBCUTANEOUS at 06:20

## 2023-07-26 RX ADMIN — INSULIN LISPRO 3 UNITS: 100 INJECTION, SOLUTION INTRAVENOUS; SUBCUTANEOUS at 18:20

## 2023-07-26 RX ADMIN — INSULIN LISPRO 2 UNITS: 100 INJECTION, SOLUTION INTRAVENOUS; SUBCUTANEOUS at 11:51

## 2023-07-26 RX ADMIN — BUDESONIDE AND FORMOTEROL FUMARATE DIHYDRATE 2 PUFF: 160; 4.5 AEROSOL RESPIRATORY (INHALATION) at 09:45

## 2023-07-26 RX ADMIN — PANTOPRAZOLE SODIUM 40 MG: 40 TABLET, DELAYED RELEASE ORAL at 09:44

## 2023-07-26 RX ADMIN — POTASSIUM CHLORIDE 40 MEQ: 1500 TABLET, EXTENDED RELEASE ORAL at 09:44

## 2023-07-26 RX ADMIN — EMPAGLIFLOZIN 10 MG: 10 TABLET, FILM COATED ORAL at 09:45

## 2023-07-26 RX ADMIN — APIXABAN 5 MG: 5 TABLET, FILM COATED ORAL at 18:18

## 2023-07-26 RX ADMIN — POTASSIUM CHLORIDE 60 MEQ: 1500 TABLET, EXTENDED RELEASE ORAL at 11:49

## 2023-07-26 NOTE — OCCUPATIONAL THERAPY NOTE
Occupational Therapy Screen        Patient Name: Brunilda Walker  CYGXN'G Date: 7/26/2023 07/26/23 0900   OT Last Visit   OT Visit Date 07/26/23   Note Type   Note type Screen     OT orders received. Chart reviewed. Pt has been ambulating independently in and out of his room. Pt is functioning close to baseline and denies questions or concerns from a therapy standpoint at this time. Please re-consult as needed if a change in functional status occurs. No further acute OT needs warranted at this time.  D/C OT.       Adrienne Underwood OTR/L

## 2023-07-26 NOTE — PHYSICAL THERAPY NOTE
Physical Therapy Screen    Patient Name: Washington Collins    YYIFB'P Date: 7/26/2023     Problem List  Principal Problem:    Acute on chronic diastolic heart failure ([address])  Active Problems:    Primary hypertension    Type 2 diabetes mellitus, without long-term current use of insulin ([facility])    Paroxysmal atrial fibrillation ([facility])    Stage 3a chronic kidney disease ([address])    Severe persistent asthma without complication       Past Medical History  Past Medical History:   Diagnosis Date    Acute gastritis without hemorrhage     last assessed 3/24/17    Asthma     Atrial fibrillation ([address])     Bilateral leg edema 02/19/2020    CHF (congestive heart failure) ([facility])     Coronary artery disease     Daytime sleepiness 07/17/2019    Diabetes mellitus ([address])     Dyspnea on exertion 10/11/2021    Edema of both feet 01/23/2020    Gastric bypass status for obesity     Hyperlipidemia     Hypertension     Hypokalemia 11/14/2021    Impaired fasting glucose     last assessed 5/10/17    Knee sprain, bilateral 06/14/2018    Leg cramps 06/16/2022    Obesity     Viral gastroenteritis     last assessed 11/4/16        Past Surgical History  Past Surgical History:   Procedure Laterality Date    CARDIAC CATHETERIZATION N/A 3/9/2022    Procedure: CARDIAC RHC W/ PRESSURE SENSOR;  Surgeon: Jf Prescott MD;  Location: [facility]; Service: Cardiology    CARDIAC CATHETERIZATION N/A 9/6/2022    Procedure: Cardiac catheterization;  Surgeon: Laura Waddell DO;  Location: [facility]; Service: Cardiology    CARDIAC CATHETERIZATION N/A 9/6/2022    Procedure: Cardiac Coronary Angiogram;  Surgeon: Laura Waddell DO;  Location: [facility];   Service: Cardiology    CARPAL TUNNEL RELEASE      bilateral    GASTRIC BYPASS  2004    with han en y    HERNIA REPAIR      ventral inscisional    TONSILLECTOMY        07/26/23 0840   Note Type   Note type Screen         PT order received; chart reviewed; spoke to the pt who reports he has been mobilizing on his own (I) and w/o AD; pt denies any balance issues or mobility deficits at this time and does not anticipate difficulty on the steps at home; overall, pt does not feel he needs to be seen by PT for any mobility skills at this time on level surfaces or elevations and expresses no concerns about going home when medically cleared; based on above, will D/C PT services at this time; pt informed/concurred; CM notified;    [employer]

## 2023-07-26 NOTE — ASSESSMENT & PLAN NOTE
Wt Readings from Last 3 Encounters:   07/26/23 (!) 155 kg (340 lb 13.3 oz)   07/18/23 (!) 158 kg (349 lb)   05/01/23 (!) 149 kg (327 lb 6.4 oz)     · Patient with diastolic heart failure, presented to ER with ongoing dyspnea and weight gain. · dry weight is 332 pounds. Patient reported he weighed 341 pounds 7/22 in am   · Mildly elevated proBNP, chest x-ray consistent with vascular congestion. · Patient's home medication is Bumex 4 mg twice daily with metolazone, patient is compliant. · Patient was initially started on IV Lasix but has been transitioned to Bumex gtt. per heart failure recommendations. · Had excellent response with Bumex gtt. , transition to Bumex 6 mg twice daily this afternoon, continue to monitor ins and outs. · Continue home medication Jardiance for now monitor Cr.   · Monitor electrolyte and replete to manage potassium above 4 and magnesium above 2.

## 2023-07-26 NOTE — UTILIZATION REVIEW
Continued Stay Review    Date: 7/26/23                           Current Patient Class: IP   Current Level of Care: MS    HPI:58 y.o. male initially admitted on 7/23/23 - DX:      Assessment/Plan:   7/26/23: No significant events overnight. Reports robust response to Bumex drip. Down 4 lbs today. Potassium 3.5   Plan : cont bumex gtt; monitor labs; give additional 60 mEq PO potassium x1 today.      Vital Signs: /63 (BP Location: Right arm)   Pulse 76   Temp 98 °F (36.7 °C) (Oral)   Resp 16   Ht 6' 1" (1.854 m)   Wt (!) 155 kg (340 lb 13.3 oz)   SpO2 97%   BMI 44.97 kg/m²       Pertinent Labs/Diagnostic Results:       Results from last 7 days   Lab Units 07/26/23  0500 07/25/23  0507 07/24/23  0522 07/23/23  1430   WBC Thousand/uL 6.74 6.54 7.03 6.29   HEMOGLOBIN g/dL 10.6* 10.5* 8.2* 10.3*   HEMATOCRIT % 34.1* 34.3* 27.3* 34.1*   PLATELETS Thousands/uL 232 238 192 248   NEUTROS ABS Thousands/µL  --   --   --  4.83         Results from last 7 days   Lab Units 07/26/23  0500 07/25/23  0507 07/24/23  0522 07/23/23  1430   SODIUM mmol/L 140 140 138 138   POTASSIUM mmol/L 3.5 3.7 3.7 4.2   CHLORIDE mmol/L 99 101 104 107   CO2 mmol/L 35* 33* 26 24   ANION GAP mmol/L 6 6 8 7   BUN mg/dL 32* 25 21 18   CREATININE mg/dL 2.17* 1.81* 1.52* 1.40*   EGFR ml/min/1.73sq m 32 40 50 55   CALCIUM mg/dL 8.2* 8.3 7.7* 8.2*   MAGNESIUM mg/dL 2.3  --   --   --      Results from last 7 days   Lab Units 07/23/23  1430   AST U/L 45   ALT U/L 32   ALK PHOS U/L 107   TOTAL PROTEIN g/dL 7.1   ALBUMIN g/dL 3.2*   TOTAL BILIRUBIN mg/dL 0.37     Results from last 7 days   Lab Units 07/26/23  1532 07/26/23  1050 07/26/23  0529 07/25/23  2054 07/25/23  1539 07/25/23  1043 07/25/23  0548 07/24/23  2044 07/24/23  1613 07/24/23  1037 07/24/23  0517 07/23/23  2155   POC GLUCOSE mg/dl 248* 226* 186* 229* 212* 223* 190* 212* 181* 214* 160* 165*     Results from last 7 days   Lab Units 07/26/23  0500 07/25/23  0507 07/24/23  0522 07/23/23  1430   GLUCOSE RANDOM mg/dL 207* 192* 160* 216*       Results from last 7 days   Lab Units 07/23/23  1838 07/23/23  1627 07/23/23  1430   HS TNI 0HR ng/L  --   --  17   HS TNI 2HR ng/L  --  16  --    HSTNI D2 ng/L  --  -1  --    HS TNI 4HR ng/L 18  --   --    HSTNI D4 ng/L 1  --   --        Results from last 7 days   Lab Units 07/23/23  1430   BNP pg/mL 233*       Results from last 7 days   Lab Units 07/25/23  1855   CLARITY UA  Clear   COLOR UA  Colorless   SPEC GRAV UA  1.007   PH UA  5.0   GLUCOSE UA mg/dl >=1000 (1%)*   KETONES UA mg/dl Negative   BLOOD UA  Negative   PROTEIN UA mg/dl Negative   NITRITE UA  Negative   BILIRUBIN UA  Negative   UROBILINOGEN UA (BE) mg/dl <2.0   LEUKOCYTES UA  Elevated glucose may cause decreased leukocyte values.  See urine microscopic for UWBC result*   WBC UA /hpf 1-2   RBC UA /hpf None Seen   BACTERIA UA /hpf None Seen   EPITHELIAL CELLS WET PREP /hpf Occasional       Medications:   Scheduled Medications:  apixaban, 5 mg, Oral, BID  atorvastatin, 10 mg, Oral, Daily  budesonide-formoterol, 2 puff, Inhalation, BID  bumetanide, 6 mg, Oral, BID (diuretic)  Empagliflozin, 10 mg, Oral, Daily  insulin glargine, 5 Units, Subcutaneous, HS  insulin lispro, 1-5 Units, Subcutaneous, HS  insulin lispro, 1-6 Units, Subcutaneous, TID AC  metoprolol succinate, 50 mg, Oral, Daily  montelukast, 10 mg, Oral, HS  pantoprazole, 40 mg, Oral, Daily  potassium chloride, 40 mEq, Oral, BID  pregabalin, 50 mg, Oral, HS  umeclidinium, 1 puff, Inhalation, Daily      Continuous IV Infusions:bumetanide (BUMEX) 12.5 mg infusion 50 mL Rate: 4 mL/hr      PRN Meds:  acetaminophen, 650 mg, Oral, Q6H PRN  albuterol, 2 puff, Inhalation, Q4H PRN  albuterol, 2.5 mg, Nebulization, Q4H PRN  ondansetron, 4 mg, Intravenous, Q6H PRN        Discharge Plan: TBD    Network Utilization Review Department  ATTENTION: Please call with any questions or concerns to [phone] and carefully listen to the prompts so that you are directed to the right person. All voicemails are confidential.  [place] [employer] all requests for admission clinical reviews, approved or denied determinations and any other requests to dedicated fax number below belonging to the campus where the patient is receiving treatment.  List of dedicated fax numbers for the Facilities:  [place] DENIALS (Administrative/Medical Necessity) [phone] [address] [facility] (Maternity/NICU/Pediatrics) [phone] [address] [phone]   [facility] [address] [facility] [phone]   [address] [place] [address] [id number] [place] [address] [phone]   [address] [address] [phone]

## 2023-07-26 NOTE — ASSESSMENT & PLAN NOTE
Lab Results   Component Value Date    EGFR 32 07/26/2023    EGFR 40 07/25/2023    EGFR 50 07/24/2023    CREATININE 2.17 (H) 07/26/2023    CREATININE 1.81 (H) 07/25/2023    CREATININE 1.52 (H) 07/24/2023     · Baseline appears to be 1.5-1.7; patient currently at baseline, creatinine 1.81, likely in setting of IV diuresis.   · Monitor renal function while on IV diuresis  · Monitor for electrolytes, replete potassium as needed

## 2023-07-26 NOTE — ASSESSMENT & PLAN NOTE
· BP acceptable, continue to monitor blood pressure while on IV diuresis  · Continue home medication metoprolol  · Routine vitals per unit

## 2023-07-26 NOTE — PROGRESS NOTES
Advanced Heart Failure / Pulmonary Hypertension Service Progress Note    Beth Clifton 66 y.o. male   MRN: [id number]  Unit/Bed#: [facility] 411-01; Encounter: [id number]    Assessment:  Principal Problem:    Acute on chronic diastolic heart failure (HCC)  Active Problems:    Primary hypertension    Type 2 diabetes mellitus, without long-term current use of insulin (HCC)    Paroxysmal atrial fibrillation (HCC)    Stage 3a chronic kidney disease ([address])    Severe persistent asthma without complication      Subjective:   Beth Clifton is a 61-year-old man with PMH as below who presented to [facility] on 07/23/2023 with worsening SOB/TELLES, weight gain, and decreased urinary response.  (no priors). CXR with "slight increased pulmonary vascular congestion." Started on IV Lasix, and cardiology consulted.      Patient seen and examined. No significant events overnight. Reports robust response to Bumex drip. Down 4 lbs today. Eating and sleeping well. Objective: Intake/ Output: 1194.3 mL / 3999 mL. Intake accurate? Weight: 340 lbs (344 lbs on 07/25). Dry weight TBD. Telemetry: NSR, rates in 90s. Today's Plan:  • Stop Bumex drip. Switch to PO Bumex (6 mg BID) starting tonight. • Advised to have family bring in MEMS pillow from home to obtain PAd reading(s) s/p IV diuresis. • Consider addition of MRA this admission if appropriate. • Potassium 3.5 this AM. Will give additional 60 mEq PO potassium x1 today. • Hemoglobin 10.6 mg/dL this AM (goal Hgb >8 mg/dL given cardiac history). Continue to monitor. Plan:  Acute on chronic HFpEF; LVEF 50-55%; LVIDd 6.6 cm; NYHA II; ACC/AHA Stage C              Etiology: Afib, HTN, obesity phenotype.              TTE 11/21/2021: LVEF 55%. LVIDd 6.0 cm. Grade 1 DD. Normal RV. Trace TR.             LHC 09/06/2022: no obstructive CAD. TTE 04/11/2023: LVEF 50%. LVIDd 6.6 cm. Grade 2 DD. Normal RV. Trace MR and TR.  Normal IVC.      Pharmacotherapies / Neurohormonal Blockade:  --Beta Blocker: metoprolol succinate 50 mg daily. --ARNi / ACEi / ARB: No.  --Aldosterone Antagonist: No.  --SGLT2 Inhibitor: empagliflozin 10 mg daily. --Home Diuretic: Bumex 4 mg BID with potassium 40 mEq BID with PRN metolazone 2.5 mg.   --Inpatient Diuretic: PO Bumex 6 mg BID with potassium 40 mEq BID.                 Advanced Therapies:              --CardioMEMS: implanted 03/09/2022. Goal PAd of 15. Atrial fibrillation, parosxysmal              [id number] = 3 (HF, HTN, DM).             Anticoagulation on Eliquis.             Rate control: BB as above.              Rhythm control: No.     Chronic kidney disease, stage IIIb              Baseline creatinine of 1.5-2.0. Today, creatinine of 2.17 (1.81 on 07/25).    Hypertension  Asthma, severe persistent: follows with Dr. Cayla Saleh as outpatient. Obstructive sleep apnea: without CPAP and equipment for ~12 months. Planning to follow-up with device company s/p discharge in attempt to get CPAP machine. Diabetes mellitus, type II  History of gastric bypass (Samuel-en-Y) surgery   History of tobacco abuse     Vitals:   Blood pressure 132/73, pulse 82, temperature 97.6 °F (36.4 °C), temperature source Oral, resp. rate 16, height 6' 1" (1.854 m), weight (!) 155 kg (340 lb 13.3 oz), SpO2 93 %. I/O last 3 completed shifts: In: 1420.5 [P.O.:1260;  I.V.:160.5]  Out: 8189 [Urine:8189]    Weight (last 2 days)     Date/Time Weight    07/26/23 0600 155 (340.83)    07/25/23 0600 156 (344.14)    07/24/23 0600 159 (350.09)        Wt Readings from Last 10 Encounters:   07/26/23 (!) 155 kg (340 lb 13.3 oz)   07/18/23 (!) 158 kg (349 lb)   05/01/23 (!) 149 kg (327 lb 6.4 oz)   04/18/23 (!) 147 kg (323 lb 13.7 oz)   04/10/23 (!) 154 kg (339 lb 11.2 oz)   03/21/23 (!) 151 kg (332 lb 0.2 oz)   03/15/23 (!) 151 kg (333 lb)   12/28/22 (!) 148 kg (326 lb)   12/27/22 (!) 147 kg (324 lb)   12/20/22 (!) 147 kg (324 lb 9.6 oz)     Vitals: 07/26/23 0317 07/26/23 0600 07/26/23 0700 07/26/23 1120   BP: 111/58  140/67 132/73   BP Location: Left arm  Left arm Right arm   Pulse: 89  79 82   Resp: 15  15 16   Temp: 98.2 °F (36.8 °C)  97.7 °F (36.5 °C) 97.6 °F (36.4 °C)   TempSrc: Oral  Oral Oral   SpO2: 96%  98% 93%   Weight:  (!) 155 kg (340 lb 13.3 oz)     Height:           Physical Exam  Vitals reviewed. Constitutional:       General: He is awake. He is not in acute distress. Appearance: Normal appearance. He is well-developed and overweight. He is not ill-appearing, toxic-appearing or diaphoretic. HENT:      Head: Normocephalic. Nose: Nose normal.      Mouth/Throat:      Mouth: Mucous membranes are moist.   Eyes:      General: No scleral icterus. Conjunctiva/sclera: Conjunctivae normal.   Neck:      Vascular: JVD present. Trachea: No tracheal deviation. Cardiovascular:      Rate and Rhythm: Normal rate and regular rhythm. No extrasystoles are present. Heart sounds: No murmur heard. Pulmonary:      Effort: Pulmonary effort is normal. No tachypnea, bradypnea or respiratory distress. Breath sounds: Normal air entry. No decreased air movement. No wheezing or rhonchi. Abdominal:      General: There is distension. Palpations: Abdomen is soft. Tenderness: There is no abdominal tenderness. Musculoskeletal:      Cervical back: Neck supple. Right lower leg: Edema present. Left lower leg: Edema present. Skin:     General: Skin is warm and dry. Coloration: Skin is pale. Skin is not jaundiced. Neurological:      General: No focal deficit present. Mental Status: He is alert and oriented to person, place, and time. Psychiatric:         Attention and Perception: Attention normal.         Mood and Affect: Mood and affect normal.         Speech: Speech normal.         Behavior: Behavior normal. Behavior is cooperative. Thought Content:  Thought content normal.       Lines/Drains/Airways Active Status     None                Current Facility-Administered Medications:   •  acetaminophen (TYLENOL) tablet 650 mg, 650 mg, Oral, Q6H PRN, Veronica Vega MD  •  albuterol (PROVENTIL HFA,VENTOLIN HFA) inhaler 2 puff, 2 puff, Inhalation, Q4H PRN, Devang Ingram MD  •  albuterol inhalation solution 2.5 mg, 2.5 mg, Nebulization, Q4H PRN, Veronica Vega MD  •  apixaban (ELIQUIS) tablet 5 mg, 5 mg, Oral, BID, Devang Ingram MD, 5 mg at 07/26/23 0944  •  atorvastatin (LIPITOR) tablet 10 mg, 10 mg, Oral, Daily, Devang Ingram MD, 10 mg at 07/25/23 1715  •  budesonide-formoterol (SYMBICORT) 160-4.5 mcg/act inhaler 2 puff, 2 puff, Inhalation, BID, Veronica Vega MD, 2 puff at 07/26/23 0945  •  bumetanide (BUMEX) tablet 6 mg, 6 mg, Oral, BID (diuretic), Elsie Corbett PA-C  •  Empagliflozin (JARDIANCE) tablet 10 mg, 10 mg, Oral, Daily, Devang Ingram MD, 10 mg at 07/26/23 0945  •  insulin glargine (LANTUS) subcutaneous injection 5 Units 0.05 mL, 5 Units, Subcutaneous, HS, Elan Gusman MD, 5 Units at 07/25/23 2112  •  insulin lispro (HumaLOG) 100 units/mL subcutaneous injection 1-5 Units, 1-5 Units, Subcutaneous, HS, Devang Ingram MD, 2 Units at 07/25/23 2113  •  insulin lispro (HumaLOG) 100 units/mL subcutaneous injection 1-6 Units, 1-6 Units, Subcutaneous, TID AC, 2 Units at 07/26/23 1151 **AND** Fingerstick Glucose (POCT), , , TID AC, Devagn Ingram MD  •  metoprolol succinate (TOPROL-XL) 24 hr tablet 50 mg, 50 mg, Oral, Daily, Devang Ingram MD, 50 mg at 07/26/23 0944  •  montelukast (SINGULAIR) tablet 10 mg, 10 mg, Oral, HS, Devang Ingram MD, 10 mg at 07/25/23 2112  •  ondansetron (ZOFRAN) injection 4 mg, 4 mg, Intravenous, Q6H PRN, Veronica Vega MD  •  pantoprazole (PROTONIX) EC tablet 40 mg, 40 mg, Oral, Daily, Devang Ingram MD, 40 mg at 07/26/23 0944  •  potassium chloride (K-DUR,KLOR-CON) CR tablet 40 mEq, 40 mEq, Oral, BID, Elsie Corbett PA-C, 40 mEq at 07/26/23 0944  •  pregabalin (LYRICA) capsule 50 mg, 50 mg, Oral, HS, Ashley Bose MD, 50 mg at 07/25/23 2112  •  umeclidinium 62.5 mcg/actuation inhaler AEPB 1 puff, 1 puff, Inhalation, Daily, Ashley Bose MD, 1 puff at 07/26/23 0945    Labs & Results:      Results from last 7 days   Lab Units 07/26/23  0500 07/25/23  0507 07/24/23  0522   WBC Thousand/uL 6.74 6.54 7.03   HEMOGLOBIN g/dL 10.6* 10.5* 8.2*   HEMATOCRIT % 34.1* 34.3* 27.3*   PLATELETS Thousands/uL 232 238 192         Results from last 7 days   Lab Units 07/26/23  0500 07/25/23  0507 07/24/23  0522 07/23/23  1430   POTASSIUM mmol/L 3.5 3.7 3.7 4.2   CHLORIDE mmol/L 99 101 104 107   CO2 mmol/L 35* 33* 26 24   BUN mg/dL 32* 25 21 18   CREATININE mg/dL 2.17* 1.81* 1.52* 1.40*   CALCIUM mg/dL 8.2* 8.3 7.7* 8.2*   ALK PHOS U/L  --   --   --  107   ALT U/L  --   --   --  32   AST U/L  --   --   --  [address] [place], [place] [address]KARINE

## 2023-07-26 NOTE — ASSESSMENT & PLAN NOTE
Start sliding scale    Recent Labs     07/25/23 2054 07/26/23  0529 07/26/23  1050 07/26/23  1532   POCGLU 229* 186* 226* 248*       Blood Sugar Average: Last 72 hrs:  (P) [id number]   · Hold home medication Januvia  · Jardiance continued  · Continue SSI and blood glucose monitoring. Continue Lantus 5 units nightly.

## 2023-07-26 NOTE — PROGRESS NOTES
[address]  Progress Note  Name: Kenna Costa  MRN: [id number]  Unit/Bed#: [facility] 335-79 I Date of Admission: 7/23/2023   Date of Service: 7/26/2023 I Hospital Day: 3    Assessment/Plan   * Acute on chronic diastolic heart failure [facility])  Assessment & Plan  Wt Readings from Last 3 Encounters:   07/26/23 (!) 155 kg (340 lb 13.3 oz)   07/18/23 (!) 158 kg (349 lb)   05/01/23 (!) 149 kg (327 lb 6.4 oz)     · Patient with diastolic heart failure, presented to ER with ongoing dyspnea and weight gain. · dry weight is 332 pounds. Patient reported he weighed 341 pounds 7/22 in am   · Mildly elevated proBNP, chest x-ray consistent with vascular congestion. · Patient's home medication is Bumex 4 mg twice daily with metolazone, patient is compliant. · Patient was initially started on IV Lasix but has been transitioned to Bumex gtt. per heart failure recommendations. · Had excellent response with Bumex gtt. , transition to Bumex 6 mg twice daily this afternoon, continue to monitor ins and outs. · Continue home medication Jardiance for now monitor Cr.   · Monitor electrolyte and replete to manage potassium above 4 and magnesium above 2. Severe persistent asthma without complication  Assessment & Plan  · follows with Dr. Soledad Lock as outpatient. · No evidence of exacerbation  · Continue inhalers while inpatient     Stage 3a chronic kidney disease [facility])  Assessment & Plan  Lab Results   Component Value Date    EGFR 32 07/26/2023    EGFR 40 07/25/2023    EGFR 50 07/24/2023    CREATININE 2.17 (H) 07/26/2023    CREATININE 1.81 (H) 07/25/2023    CREATININE 1.52 (H) 07/24/2023     · Baseline appears to be 1.5-1.7; patient currently at baseline, creatinine 1.81, likely in setting of IV diuresis.   · Monitor renal function while on IV diuresis  · Monitor for electrolytes, replete potassium as needed    Paroxysmal atrial fibrillation (HCC)  Assessment & Plan  · Rate controlled on metoprolol, anticoagulated with Eliquis  · Heart rate regular. · Will need echocardiogram once volume optimized. · Cardiology following. · Monitor on telemetry. Type 2 diabetes mellitus, without long-term current use of insulin [facility])  Assessment & Plan  Start sliding scale    Recent Labs     07/25/23  2054 07/26/23  0529 07/26/23  1050 07/26/23  1532   POCGLU 229* 186* 226* 248*       Blood Sugar Average: Last 72 hrs:  (P) 541.5349834002539913   · Hold home medication Januvia  · Jardiance continued  · Continue SSI and blood glucose monitoring. Continue Lantus 5 units nightly. Primary hypertension  Assessment & Plan  · BP acceptable, continue to monitor blood pressure while on IV diuresis  · Continue home medication metoprolol  · Routine vitals per unit                VTE Pharmacologic Prophylaxis: VTE Score: 4 Moderate Risk (Score 3-4) - Pharmacological DVT Prophylaxis Ordered: apixaban (Eliquis). Patient Centered Rounds: I performed bedside rounds with nursing staff today. Discussions with Specialists or Other Care Team Provider: BIANCA    Education and Discussions with Family / Patient: Patient at bedside. .     Total Time Spent on Date of Encounter in care of patient: 35 minutes This time was spent on one or more of the following: performing physical exam; counseling and coordination of care; obtaining or reviewing history; documenting in the medical record; reviewing/ordering tests, medications or procedures; communicating with other healthcare professionals and discussing with patient's family/caregivers. Current Length of Stay: 3 day(s)  Current Patient Status: Inpatient   Certification Statement: The patient will continue to require additional inpatient hospital stay due to Has been on Bumex gtt. until this afternoon, transition to oral, if diuresed well can be discharged tomorrow. Discharge Plan: Anticipate discharge in 24-48 hrs to home.     Code Status: Level 1 - Full Code    Subjective:   Patient examined at bedside, reports excellent response to Bumex gtt. Objective:     Vitals:   Temp (24hrs), Av.9 °F (36.6 °C), Min:97.6 °F (36.4 °C), Max:98.4 °F (36.9 °C)    Temp:  [97.6 °F (36.4 °C)-98.4 °F (36.9 °C)] 98 °F (36.7 °C)  HR:  [76-89] 76  Resp:  [15-16] 16  BP: (111-141)/(58-73) 141/63  SpO2:  [93 %-98 %] 97 %  Body mass index is 44.97 kg/m². Input and Output Summary (last 24 hours): Intake/Output Summary (Last 24 hours) at 2023 1810  Last data filed at 2023 1207  Gross per 24 hour   Intake 979.27 ml   Output 3474 ml   Net -2494.73 ml       Physical Exam:   Physical Exam  Constitutional:       Appearance: Normal appearance. HENT:      Head: Normocephalic and atraumatic. Mouth/Throat:      Mouth: Mucous membranes are moist.      Pharynx: Oropharynx is clear. Eyes:      Conjunctiva/sclera: Conjunctivae normal.      Pupils: Pupils are equal, round, and reactive to light. Cardiovascular:      Rate and Rhythm: Normal rate and regular rhythm. Pulses: Normal pulses. Heart sounds: Normal heart sounds. Pulmonary:      Effort: Pulmonary effort is normal.      Breath sounds: Normal breath sounds. Abdominal:      General: Abdomen is flat. Bowel sounds are normal.   Musculoskeletal:      Right lower leg: Edema present. Left lower leg: Edema present. Skin:     General: Skin is warm and dry. Neurological:      General: No focal deficit present. Mental Status: He is alert and oriented to person, place, and time.    Psychiatric:         Mood and Affect: Mood normal.         Behavior: Behavior normal.          Additional Data:     Labs:  Results from last 7 days   Lab Units 23  0500 23  0522 23  1430   WBC Thousand/uL 6.74   < > 6.29   HEMOGLOBIN g/dL 10.6*   < > 10.3*   HEMATOCRIT % 34.1*   < > 34.1*   PLATELETS Thousands/uL 232   < > 248   NEUTROS PCT %  --   --  77*   LYMPHS PCT %  --   --  13*   MONOS PCT %  --   --  9   EOS PCT %  --   --  0    < > = values in this interval not displayed. Results from last 7 days   Lab Units 23  0500 23  0522 23  1430   SODIUM mmol/L 140   < > 138   POTASSIUM mmol/L 3.5   < > 4.2   CHLORIDE mmol/L 99   < > 107   CO2 mmol/L 35*   < > 24   BUN mg/dL 32*   < > 18   CREATININE mg/dL 2.17*   < > 1.40*   ANION GAP mmol/L 6   < > 7   CALCIUM mg/dL 8.2*   < > 8.2*   ALBUMIN g/dL  --   --  3.2*   TOTAL BILIRUBIN mg/dL  --   --  0.37   ALK PHOS U/L  --   --  107   ALT U/L  --   --  32   AST U/L  --   --  45   GLUCOSE RANDOM mg/dL 207*   < > 216*    < > = values in this interval not displayed. Results from last 7 days   Lab Units 23  1532 23  1050 23  0529 23  2054 23  1539 23  1043 23  0548 23  2044 23  1613 23  1037 23  0517 23  2155   POC GLUCOSE mg/dl 248* 226* 186* 229* 212* 223* 190* 212* 181* 214* 160* 165*               Lines/Drains:  Invasive Devices     Peripheral Intravenous Line  Duration           Peripheral IV 23 Left;Ventral (anterior) Forearm <1 day                  Telemetry:  Telemetry Orders (From admission, onward)             24 Hour Telemetry Monitoring  Continuous x 24 Hours (Telem)           Question:  Reason for 24 Hour Telemetry  Answer:  Decompensated CHF- and any one of the following: continuous diuretic infusion or total diuretic dose >200 mg daily, associated electrolyte derangement (I.e. K < 3.0), ionotropic drip (continuous infusion), hx of ventricular arrhythmia, or new EF < 35%                 Telemetry Reviewed: Normal Sinus Rhythm  Indication for Continued Telemetry Use: Acute CHF on >200 mg lasix/day or equivalent dose or with new reduced EF. Imaging: No pertinent imaging reviewed.     Recent Cultures (last 7 days):         Last 24 Hours Medication List:   Current Facility-Administered Medications   Medication Dose Route Frequency Provider Last Rate   • acetaminophen  650 mg Oral Q6H PRN Chu Landin MD     • albuterol  2 puff Inhalation Q4H PRN Devang Ingram MD     • albuterol  2.5 mg Nebulization Q4H PRN Chu Landin MD     • apixaban  5 mg Oral BID Chu Landin MD     • atorvastatin  10 mg Oral Daily Devang Ingram MD     • budesonide-formoterol  2 puff Inhalation BID Chu Landin MD     • bumetanide  6 mg Oral BID (diuretic) Tiffany Loo PA-C     • Empagliflozin  10 mg Oral Daily Chu Landin MD     • insulin glargine  5 Units Subcutaneous HS Kimber Gilliam MD     • insulin lispro  1-5 Units Subcutaneous HS Chu Landin MD     • insulin lispro  1-6 Units Subcutaneous TID Elise Brandt MD     • metoprolol succinate  50 mg Oral Daily Chu Landin MD     • montelukast  10 mg Oral HS Chu Landin MD     • ondansetron  4 mg Intravenous Q6H PRN Chu Landin MD     • pantoprazole  40 mg Oral Daily Chu Landin MD     • potassium chloride  40 mEq Oral BID Tiffany Loo PA-C     • pregabalin  50 mg Oral HS Chu Landin MD     • umeclidinium  1 puff Inhalation Daily Chu Landin MD          Today, Patient Was Seen By: Kimber Gilliam MD    **Please Note: This note may have been constructed using a voice recognition system. **

## 2023-07-26 NOTE — ASSESSMENT & PLAN NOTE
· follows with Dr. Foster Fair as outpatient.   · No evidence of exacerbation  · Continue inhalers while inpatient

## 2023-07-27 ENCOUNTER — PATIENT OUTREACH (OUTPATIENT)
Dept: CARDIOLOGY CLINIC | Facility: CLINIC | Age: 58
End: 2023-07-27

## 2023-07-27 LAB
ANION GAP SERPL CALCULATED.3IONS-SCNC: 7 MMOL/L
BUN SERPL-MCNC: 41 MG/DL (ref 5–25)
CALCIUM SERPL-MCNC: 7.8 MG/DL (ref 8.3–10.1)
CHLORIDE SERPL-SCNC: 98 MMOL/L (ref 96–108)
CO2 SERPL-SCNC: 32 MMOL/L (ref 21–32)
CREAT SERPL-MCNC: 2.25 MG/DL (ref 0.6–1.3)
GFR SERPL CREATININE-BSD FRML MDRD: 31 ML/MIN/1.73SQ M
GLUCOSE SERPL-MCNC: 180 MG/DL (ref 65–140)
GLUCOSE SERPL-MCNC: 194 MG/DL (ref 65–140)
GLUCOSE SERPL-MCNC: 208 MG/DL (ref 65–140)
GLUCOSE SERPL-MCNC: 210 MG/DL (ref 65–140)
GLUCOSE SERPL-MCNC: 323 MG/DL (ref 65–140)
MAGNESIUM SERPL-MCNC: 2.6 MG/DL (ref 1.6–2.6)
POTASSIUM SERPL-SCNC: 3.8 MMOL/L (ref 3.5–5.3)
SODIUM SERPL-SCNC: 137 MMOL/L (ref 135–147)

## 2023-07-27 PROCEDURE — 80048 BASIC METABOLIC PNL TOTAL CA: CPT | Performed by: PHYSICIAN ASSISTANT

## 2023-07-27 PROCEDURE — 99232 SBSQ HOSP IP/OBS MODERATE 35: CPT | Performed by: INTERNAL MEDICINE

## 2023-07-27 PROCEDURE — 94664 DEMO&/EVAL PT USE INHALER: CPT

## 2023-07-27 PROCEDURE — 82948 REAGENT STRIP/BLOOD GLUCOSE: CPT

## 2023-07-27 PROCEDURE — 83735 ASSAY OF MAGNESIUM: CPT | Performed by: PHYSICIAN ASSISTANT

## 2023-07-27 PROCEDURE — 99232 SBSQ HOSP IP/OBS MODERATE 35: CPT | Performed by: STUDENT IN AN ORGANIZED HEALTH CARE EDUCATION/TRAINING PROGRAM

## 2023-07-27 RX ORDER — INSULIN LISPRO 100 [IU]/ML
3 INJECTION, SOLUTION INTRAVENOUS; SUBCUTANEOUS
Status: DISCONTINUED | OUTPATIENT
Start: 2023-07-27 | End: 2023-07-28 | Stop reason: HOSPADM

## 2023-07-27 RX ORDER — POTASSIUM CHLORIDE 20 MEQ/1
20 TABLET, EXTENDED RELEASE ORAL ONCE
Status: COMPLETED | OUTPATIENT
Start: 2023-07-27 | End: 2023-07-27

## 2023-07-27 RX ADMIN — POTASSIUM CHLORIDE 20 MEQ: 1500 TABLET, EXTENDED RELEASE ORAL at 12:23

## 2023-07-27 RX ADMIN — PREGABALIN 50 MG: 50 CAPSULE ORAL at 21:17

## 2023-07-27 RX ADMIN — INSULIN LISPRO 5 UNITS: 100 INJECTION, SOLUTION INTRAVENOUS; SUBCUTANEOUS at 12:24

## 2023-07-27 RX ADMIN — UMECLIDINIUM 1 PUFF: 62.5 AEROSOL, POWDER ORAL at 08:23

## 2023-07-27 RX ADMIN — BUMETANIDE 5 MG: 1 TABLET ORAL at 16:32

## 2023-07-27 RX ADMIN — METOPROLOL SUCCINATE 50 MG: 50 TABLET, EXTENDED RELEASE ORAL at 08:21

## 2023-07-27 RX ADMIN — INSULIN LISPRO 3 UNITS: 100 INJECTION, SOLUTION INTRAVENOUS; SUBCUTANEOUS at 12:23

## 2023-07-27 RX ADMIN — PANTOPRAZOLE SODIUM 40 MG: 40 TABLET, DELAYED RELEASE ORAL at 08:21

## 2023-07-27 RX ADMIN — INSULIN LISPRO 2 UNITS: 100 INJECTION, SOLUTION INTRAVENOUS; SUBCUTANEOUS at 21:19

## 2023-07-27 RX ADMIN — ATORVASTATIN CALCIUM 10 MG: 10 TABLET, FILM COATED ORAL at 18:27

## 2023-07-27 RX ADMIN — INSULIN LISPRO 2 UNITS: 100 INJECTION, SOLUTION INTRAVENOUS; SUBCUTANEOUS at 08:22

## 2023-07-27 RX ADMIN — INSULIN LISPRO 3 UNITS: 100 INJECTION, SOLUTION INTRAVENOUS; SUBCUTANEOUS at 16:32

## 2023-07-27 RX ADMIN — BUDESONIDE AND FORMOTEROL FUMARATE DIHYDRATE 2 PUFF: 160; 4.5 AEROSOL RESPIRATORY (INHALATION) at 18:28

## 2023-07-27 RX ADMIN — APIXABAN 5 MG: 5 TABLET, FILM COATED ORAL at 08:21

## 2023-07-27 RX ADMIN — BUMETANIDE 6 MG: 2 TABLET ORAL at 08:21

## 2023-07-27 RX ADMIN — APIXABAN 5 MG: 5 TABLET, FILM COATED ORAL at 18:27

## 2023-07-27 RX ADMIN — INSULIN GLARGINE 5 UNITS: 100 INJECTION, SOLUTION SUBCUTANEOUS at 21:17

## 2023-07-27 RX ADMIN — POTASSIUM CHLORIDE 40 MEQ: 1500 TABLET, EXTENDED RELEASE ORAL at 08:21

## 2023-07-27 RX ADMIN — EMPAGLIFLOZIN 10 MG: 10 TABLET, FILM COATED ORAL at 08:24

## 2023-07-27 RX ADMIN — INSULIN LISPRO 2 UNITS: 100 INJECTION, SOLUTION INTRAVENOUS; SUBCUTANEOUS at 16:32

## 2023-07-27 RX ADMIN — POTASSIUM CHLORIDE 40 MEQ: 1500 TABLET, EXTENDED RELEASE ORAL at 18:27

## 2023-07-27 RX ADMIN — BUDESONIDE AND FORMOTEROL FUMARATE DIHYDRATE 2 PUFF: 160; 4.5 AEROSOL RESPIRATORY (INHALATION) at 08:23

## 2023-07-27 RX ADMIN — MONTELUKAST 10 MG: 10 TABLET, FILM COATED ORAL at 21:17

## 2023-07-27 NOTE — ASSESSMENT & PLAN NOTE
· follows with Dr. Fan Meyers as outpatient.   · No evidence of exacerbation  · Continue inhalers while inpatient

## 2023-07-27 NOTE — PROGRESS NOTES
[id number]  Progress Note  Name: Sharon Weaver  MRN: [id number]  Unit/Bed#: [facility] 004-10 I Date of Admission: 7/23/2023   Date of Service: 7/27/2023 I Hospital Day: 4    Assessment/Plan   * Acute on chronic diastolic heart failure [facility])  Assessment & Plan  Wt Readings from Last 3 Encounters:   07/27/23 (!) 156 kg (344 lb 5.7 oz)   07/18/23 (!) 158 kg (349 lb)   05/01/23 (!) 149 kg (327 lb 6.4 oz)     · Patient with diastolic heart failure, presented to ER with ongoing dyspnea and weight gain. · dry weight is 332 pounds. Patient reported he weighed 341 pounds 7/22 in am   · Mildly elevated proBNP, chest x-ray consistent with vascular congestion. · Patient's home medication is Bumex 4 mg twice daily with metolazone, patient is compliant. · Patient was initially started on IV Lasix but has been transitioned to Bumex gtt. per heart failure recommendations. · Had excellent response with Bumex gtt. , transition to p.o. Bumex on 7/26, dose reduced on 7/27. · Continue to monitor ins and outs. · Continue home medication Jardiance for now monitor Cr.   · Monitor electrolyte and replete to manage potassium above 4 and magnesium above 2. Severe persistent asthma without complication  Assessment & Plan  · follows with Dr. Charles Bojorquez as outpatient. · No evidence of exacerbation  · Continue inhalers while inpatient     Stage 3a chronic kidney disease [facility])  Assessment & Plan  Lab Results   Component Value Date    EGFR 31 07/27/2023    EGFR 32 07/26/2023    EGFR 40 07/25/2023    CREATININE 2.25 (H) 07/27/2023    CREATININE 2.17 (H) 07/26/2023    CREATININE 1.81 (H) 07/25/2023     · Baseline appears to be 1.5-1.7; patient currently at baseline, creatinine has been trending up likely in setting of IV diuresis. Monitor renal function. Diuretics dose reduced today.   · Monitor for electrolytes, replete potassium as needed    Paroxysmal atrial fibrillation (HCC)  Assessment & Plan  · Rate controlled on metoprolol, anticoagulated with Eliquis  · Heart rate regular. · Will need echocardiogram once volume optimized. · Cardiology following. · Monitor on telemetry. Type 2 diabetes mellitus, without long-term current use of insulin [facility])  Assessment & Plan  Start sliding scale    Recent Labs     07/26/23  1532 07/26/23  2057 07/27/23  0559 07/27/23  1056   POCGLU 248* 251* 194* 323*       Blood Sugar Average: Last 72 hrs:  (P) [id number]   · Hold home medication Januvia  · Jardiance continued  · Continue SSI and blood glucose monitoring. Continue Lantus 5 units nightly. We will add 5 units premeals. Primary hypertension  Assessment & Plan  · BP acceptable, continue to monitor blood pressure while on IV diuresis  · Continue home medication metoprolol  · Routine vitals per unit                VTE Pharmacologic Prophylaxis: VTE Score: 4 Moderate Risk (Score 3-4) - Pharmacological DVT Prophylaxis Ordered: apixaban (Eliquis). Patient Centered Rounds: I performed bedside rounds with nursing staff today. Discussions with Specialists or Other Care Team Provider: Cardiology, CM. Education and Discussions with Family / Patient: Updated patient at bedside, patient has been updating his wife. .     Total Time Spent on Date of Encounter in care of patient: 35 minutes This time was spent on one or more of the following: performing physical exam; counseling and coordination of care; obtaining or reviewing history; documenting in the medical record; reviewing/ordering tests, medications or procedures; communicating with other healthcare professionals and discussing with patient's family/caregivers. Current Length of Stay: 4 day(s)  Current Patient Status: Inpatient   Certification Statement: The patient will continue to require additional inpatient hospital stay due to Still volume overloaded, cardiology changed diuretic dose today.   Discharge Plan: Anticipate discharge in 24-48 hrs to home.    Code Status: Level 1 - Full Code    Subjective:   Patient examined at bedside, denies any active complaints. Patient is not on insulin at home, and per patient his sugars are always in 120s being on [place]. Objective:     Vitals:   Temp (24hrs), Av.1 °F (36.7 °C), Min:97.8 °F (36.6 °C), Max:98.3 °F (36.8 °C)    Temp:  [97.8 °F (36.6 °C)-98.3 °F (36.8 °C)] 97.8 °F (36.6 °C)  HR:  [76-94] 77  Resp:  [15-17] 17  BP: (111-141)/(56-79) 133/72  SpO2:  [96 %-98 %] 96 %  Body mass index is 45.43 kg/m². Input and Output Summary (last 24 hours): Intake/Output Summary (Last 24 hours) at 2023 1216  Last data filed at 2023 1216  Gross per 24 hour   Intake 720 ml   Output 3000 ml   Net -2280 ml       Physical Exam:   Physical Exam  Constitutional:       Appearance: Normal appearance. He is obese. HENT:      Head: Normocephalic and atraumatic. Mouth/Throat:      Mouth: Mucous membranes are moist.      Pharynx: Oropharynx is clear. Eyes:      Conjunctiva/sclera: Conjunctivae normal.      Pupils: Pupils are equal, round, and reactive to light. Cardiovascular:      Rate and Rhythm: Normal rate and regular rhythm. Pulmonary:      Effort: Pulmonary effort is normal.      Breath sounds: Normal breath sounds. Abdominal:      General: Abdomen is flat. Bowel sounds are normal.   Musculoskeletal:      Right lower leg: Edema present. Left lower leg: Edema present. Neurological:      Mental Status: He is alert and oriented to person, place, and time. Mental status is at baseline.    Psychiatric:         Behavior: Behavior normal.          Additional Data:     Labs:  Results from last 7 days   Lab Units 23  0500 23  0522 23  1430   WBC Thousand/uL 6.74   < > 6.29   HEMOGLOBIN g/dL 10.6*   < > 10.3*   HEMATOCRIT % 34.1*   < > 34.1*   PLATELETS Thousands/uL 232   < > 248   NEUTROS PCT %  --   --  77*   LYMPHS PCT %  --   --  13*   MONOS PCT %  --   --  9 EOS PCT %  --   --  0    < > = values in this interval not displayed. Results from last 7 days   Lab Units 07/27/23  0506 07/24/23  0522 07/23/23  1430   SODIUM mmol/L 137   < > 138   POTASSIUM mmol/L 3.8   < > 4.2   CHLORIDE mmol/L 98   < > 107   CO2 mmol/L 32   < > 24   BUN mg/dL 41*   < > 18   CREATININE mg/dL 2.25*   < > 1.40*   ANION GAP mmol/L 7   < > 7   CALCIUM mg/dL 7.8*   < > 8.2*   ALBUMIN g/dL  --   --  3.2*   TOTAL BILIRUBIN mg/dL  --   --  0.37   ALK PHOS U/L  --   --  107   ALT U/L  --   --  32   AST U/L  --   --  45   GLUCOSE RANDOM mg/dL 180*   < > 216*    < > = values in this interval not displayed. Results from last 7 days   Lab Units 07/27/23  1056 07/27/23  0559 07/26/23  2057 07/26/23  1532 07/26/23  1050 07/26/23  0529 07/25/23  2054 07/25/23  1539 07/25/23  1043 07/25/23  0548 07/24/23  2044 07/24/23  1613   POC GLUCOSE mg/dl 323* 194* 251* 248* 226* 186* 229* 212* 223* 190* 212* 181*               Lines/Drains:  Invasive Devices     Peripheral Intravenous Line  Duration           Peripheral IV 07/26/23 Left;Ventral (anterior) Forearm 1 day                  Telemetry:  Telemetry Orders (From admission, onward)             24 Hour Telemetry Monitoring  Continuous x 24 Hours (Telem)        Expiring   Question:  Reason for 24 Hour Telemetry  Answer:  Decompensated CHF- and any one of the following: continuous diuretic infusion or total diuretic dose >200 mg daily, associated electrolyte derangement (I.e. K < 3.0), ionotropic drip (continuous infusion), hx of ventricular arrhythmia, or new EF < 35%                 Telemetry Reviewed: Normal Sinus Rhythm  Indication for Continued Telemetry Use: No indication for continued use. Will discontinue. Imaging: No pertinent imaging reviewed.     Recent Cultures (last 7 days):         Last 24 Hours Medication List:   Current Facility-Administered Medications   Medication Dose Route Frequency Provider Last Rate   • acetaminophen 650 mg Oral Q6H PRN Richard Ruvalcaba MD     • albuterol  2 puff Inhalation Q4H PRN Devang Ingram MD     • albuterol  2.5 mg Nebulization Q4H PRN Richard Ruvalcaba MD     • apixaban  5 mg Oral BID Richard Ruvalcaba MD     • atorvastatin  10 mg Oral Daily Devang Ingram MD     • budesonide-formoterol  2 puff Inhalation BID Richard Ruvalcaba MD     • bumetanide  5 mg Oral BID (diuretic) Tracee Bhatt PA-C     • Empagliflozin  10 mg Oral Daily Richard Ruvalcaba MD     • insulin glargine  5 Units Subcutaneous HS Catrachita Chu MD     • insulin lispro  1-5 Units Subcutaneous HS Richard Ruvalcaba MD     • insulin lispro  1-6 Units Subcutaneous TID Erika Boothe MD     • insulin lispro  3 Units Subcutaneous TID With Meals Catrachita Chu MD     • metoprolol succinate  50 mg Oral Daily Richard Ruvalcaba MD     • montelukast  10 mg Oral HS Richard Ruvalcaba MD     • ondansetron  4 mg Intravenous Q6H PRN Rihcard Ruvalcaba MD     • pantoprazole  40 mg Oral Daily Richard Ruvalcaba MD     • potassium chloride  20 mEq Oral Once Tracee Bhatt PA-C     • potassium chloride  40 mEq Oral BID Tracee Bhatt PA-C     • pregabalin  50 mg Oral HS Richard Ruvalcaba MD     • umeclidinium  1 puff Inhalation Daily Richard Ruvalcaba MD          Today, Patient Was Seen By: Catrachita Chu MD    **Please Note: This note may have been constructed using a voice recognition system. **

## 2023-07-27 NOTE — ASSESSMENT & PLAN NOTE
Lab Results   Component Value Date    EGFR 31 07/27/2023    EGFR 32 07/26/2023    EGFR 40 07/25/2023    CREATININE 2.25 (H) 07/27/2023    CREATININE 2.17 (H) 07/26/2023    CREATININE 1.81 (H) 07/25/2023     · Baseline appears to be 1.5-1.7; patient currently at baseline, creatinine has been trending up likely in setting of IV diuresis. Monitor renal function. Diuretics dose reduced today.   · Monitor for electrolytes, replete potassium as needed

## 2023-07-27 NOTE — ASSESSMENT & PLAN NOTE
Start sliding scale    Recent Labs     07/26/23  1532 07/26/23  2057 07/27/23  0559 07/27/23  1056   POCGLU 248* 251* 194* 323*       Blood Sugar Average: Last 72 hrs:  (P) 217.5177359318240239   · Hold home medication Januvia  · Jardiance continued  · Continue SSI and blood glucose monitoring. Continue Lantus 5 units nightly. We will add 5 units premeals.

## 2023-07-27 NOTE — PROGRESS NOTES
Patient is admitted to [address] Outpatient Advanced Heart Failure LCSW completed chart review and rounded with the HF Team.  HF Team continues to monitor patient; anticipate dc tomorrow. Referral not entered at this time for outpatient social work. Please enter referral if outpatient resources are needed in the future.

## 2023-07-27 NOTE — PROGRESS NOTES
Advanced Heart Failure / Pulmonary Hypertension Service Progress Note    Keagan Donnelly 66 y.o. male   MRN: [id number]  Unit/Bed#: [facility] 411-01; Encounter: [id number]    Assessment:  Principal Problem:    Acute on chronic diastolic heart failure (HCC)  Active Problems:    Primary hypertension    Type 2 diabetes mellitus, without long-term current use of insulin (HCC)    Paroxysmal atrial fibrillation (HCC)    Stage 3a chronic kidney disease ([address])    Severe persistent asthma without complication      Subjective:   Keagan Donnelly is a 31-year-old man with PMH as below who presented to [facility] on 07/23/2023 with worsening SOB/TELLES, weight gain, and decreased urinary response.  (no priors). CXR with "slight increased pulmonary vascular congestion." Started on IV Lasix, and cardiology consulted.      Patient seen and examined. No significant events overnight. No current cardiac complaints. Concerned about 4 lbs weight gain overnight. Denies changes to bowel habits or current constipation. Feels his legs are smallest they've been in a while. Continues to have leg cramping. Objective: Intake/ Output: 923.2 mL / 1500 mL. Intake accurate? Weight: 344 lbs (340 lbs on 07/26). Dry weight TBD (in 340s?). Telemetry: NSR, rates in 90s. Today's Plan:  • Decrease PO Bumex to 5 mg BID. • MEMS reading this AM with PAd of 13 (goal of 15). Will check again tomorrow AM.   • Potassium 3.8 this AM. Will give additional 20 mEq PO potassium x1 today. • Hemoglobin 10.6 mg/dL yesterday (goal Hgb >8 mg/dL given cardiac history). Continue to monitor. • Anticipate patient being stable for discharge from HF standpoint in next 24-36 hours. Plan:  Acute on chronic HFpEF; LVEF 50-55%; LVIDd 6.6 cm; NYHA II; ACC/AHA Stage C              Etiology: Afib, HTN, obesity phenotype.              TTE 11/21/2021: LVEF 55%. LVIDd 6.0 cm. Grade 1 DD. Normal RV. Trace TR.                [facility] 09/06/2022: no obstructive CAD.   TTE 04/11/2023: LVEF 50%. LVIDd 6.6 cm. Grade 2 DD. Normal RV. Trace MR and TR. Normal IVC.      Pharmacotherapies / Neurohormonal Blockade:  --Beta Blocker: metoprolol succinate 50 mg daily. --ARNi / ACEi / ARB: No.  --Aldosterone Antagonist: No.  --SGLT2 Inhibitor: empagliflozin 10 mg daily. --Home Diuretic: Bumex 4 mg BID with potassium 40 mEq BID with PRN metolazone 2.5 mg.   --Inpatient Diuretic: PO Bumex 5 mg BID with potassium 40 mEq BID.                 Advanced Therapies:              --CardioMEMS: implanted 03/09/2022. Goal PAd of 15. Atrial fibrillation, parosxysmal              [id number] = 3 (HF, HTN, DM).             Anticoagulation on Eliquis.             Rate control: BB as above.              Rhythm control: No.     Chronic kidney disease, stage IIIb              Baseline creatinine of 1.5-2.0. Today, creatinine of 2.25 (2.17 on 07/26).    Hypertension  Asthma, severe persistent: follows with Dr. Charles Bojorquez as outpatient. Obstructive sleep apnea: without CPAP and equipment for ~12 months. Planning to follow-up with device company s/p discharge in attempt to get CPAP machine. Diabetes mellitus, type II  History of gastric bypass (Samuel-en-Y) surgery   History of tobacco abuse     Vitals:   Blood pressure 133/72, pulse 77, temperature 97.8 °F (36.6 °C), temperature source Oral, resp. rate 17, height 6' 1" (1.854 m), weight (!) 156 kg (344 lb 5.7 oz), SpO2 96 %. I/O last 3 completed shifts:   In: 1519.3 [P.O.:1440; I.V.:79.3]  Out: 4374 [Urine:4374]    Weight (last 2 days)     Date/Time Weight    07/27/23 0600 156 (344.36)    07/26/23 0600 155 (340.83)    07/25/23 0600 156 (344.14)        Wt Readings from Last 10 Encounters:   07/27/23 (!) 156 kg (344 lb 5.7 oz)   07/18/23 (!) 158 kg (349 lb)   05/01/23 (!) 149 kg (327 lb 6.4 oz)   04/18/23 (!) 147 kg (323 lb 13.7 oz)   04/10/23 (!) 154 kg (339 lb 11.2 oz)   03/21/23 (!) 151 kg (332 lb 0.2 oz)   03/15/23 (!) 151 kg (333 lb)   12/28/22 (!) 148 kg (326 lb)   12/27/22 (!) 147 kg (324 lb)   12/20/22 (!) 147 kg (324 lb 9.6 oz)     Vitals:    07/27/23 0302 07/27/23 0600 07/27/23 0703 07/27/23 1058   BP: 111/56  138/65 133/72   BP Location: Left arm  Right arm Right arm   Pulse: 77  77 77   Resp: 15  16 17   Temp: 98.1 °F (36.7 °C)  98.2 °F (36.8 °C) 97.8 °F (36.6 °C)   TempSrc: Oral  Oral Oral   SpO2: 97%  98% 96%   Weight:  (!) 156 kg (344 lb 5.7 oz)     Height:           Physical Exam  Vitals reviewed. Constitutional:       General: He is awake. He is not in acute distress. Appearance: Normal appearance. He is well-developed and overweight. He is not toxic-appearing or diaphoretic. HENT:      Head: Normocephalic. Nose: Nose normal.      Mouth/Throat:      Mouth: Mucous membranes are moist.   Eyes:      General: No scleral icterus. Conjunctiva/sclera: Conjunctivae normal.   Neck:      Vascular: No JVD. Trachea: No tracheal deviation. Cardiovascular:      Rate and Rhythm: Normal rate and regular rhythm. No extrasystoles are present. Heart sounds: No murmur heard. Pulmonary:      Effort: Pulmonary effort is normal. No tachypnea, bradypnea or respiratory distress. Breath sounds: Normal air entry. No decreased air movement. No wheezing or rhonchi. Abdominal:      General: There is distension. Palpations: Abdomen is soft. Tenderness: There is no abdominal tenderness. Musculoskeletal:      Cervical back: Neck supple. Right lower leg: Edema (trace) present. Left lower leg: Edema (trace) present. Skin:     General: Skin is warm and dry. Coloration: Skin is pale. Skin is not jaundiced. Neurological:      General: No focal deficit present. Mental Status: He is alert and oriented to person, place, and time.    Psychiatric:         Attention and Perception: Attention normal.         Mood and Affect: Mood and affect normal.         Speech: Speech normal.         Behavior: Behavior normal. Behavior is cooperative. Thought Content:  Thought content normal.       Lines/Drains/Airways     Active Status     None                Current Facility-Administered Medications:   •  acetaminophen (TYLENOL) tablet 650 mg, 650 mg, Oral, Q6H PRN, Joyce Vergara MD  •  albuterol (PROVENTIL HFA,VENTOLIN HFA) inhaler 2 puff, 2 puff, Inhalation, Q4H PRN, Joyce Vergara MD  •  albuterol inhalation solution 2.5 mg, 2.5 mg, Nebulization, Q4H PRN, Joyce Vergara MD  •  apixaban (ELIQUIS) tablet 5 mg, 5 mg, Oral, BID, Devang Ingram MD, 5 mg at 07/27/23 1010  •  atorvastatin (LIPITOR) tablet 10 mg, 10 mg, Oral, Daily, Devang Ingram MD, 10 mg at 07/26/23 1818  •  budesonide-formoterol (SYMBICORT) 160-4.5 mcg/act inhaler 2 puff, 2 puff, Inhalation, BID, Joyce Vergara MD, 2 puff at 07/27/23 8206  •  bumetanide (BUMEX) tablet 5 mg, 5 mg, Oral, BID (diuretic), Tamara Greenfield PA-C  •  Empagliflozin (JARDIANCE) tablet 10 mg, 10 mg, Oral, Daily, Devang Ingram MD, 10 mg at 07/27/23 0824  •  insulin glargine (LANTUS) subcutaneous injection 5 Units 0.05 mL, 5 Units, Subcutaneous, HS, Marcial Damian MD, 5 Units at 07/26/23 2119  •  insulin lispro (HumaLOG) 100 units/mL subcutaneous injection 1-5 Units, 1-5 Units, Subcutaneous, HS, Devang Ingram MD, 2 Units at 07/26/23 2118  •  insulin lispro (HumaLOG) 100 units/mL subcutaneous injection 1-6 Units, 1-6 Units, Subcutaneous, TID AC, 2 Units at 07/27/23 2135 **AND** Fingerstick Glucose (POCT), , , TID AC, Devang Ingram MD  •  metoprolol succinate (TOPROL-XL) 24 hr tablet 50 mg, 50 mg, Oral, Daily, Devang Ingram MD, 50 mg at 07/27/23 8214  •  montelukast (SINGULAIR) tablet 10 mg, 10 mg, Oral, HS, Devang Ingram MD, 10 mg at 07/26/23 1110  •  ondansetron (ZOFRAN) injection 4 mg, 4 mg, Intravenous, Q6H PRN, Joyce Vergara MD  •  pantoprazole (PROTONIX) EC tablet 40 mg, 40 mg, Oral, Daily, Devang Ingram MD, 40 mg at 07/27/23 9506  •  potassium chloride (K-DUR,KLOR-CON) CR tablet 20 mEq, 20 mEq, Oral, Once, Jackson Downey PA-C  •  potassium chloride (K-DUR,KLOR-CON) CR tablet 40 mEq, 40 mEq, Oral, BID, Olivia García PA-C, 40 mEq at 07/27/23 5102  •  pregabalin (LYRICA) capsule 50 mg, 50 mg, Oral, HS, Devang Ingram MD, 50 mg at 07/26/23 2120  •  umeclidinium 62.5 mcg/actuation inhaler AEPB 1 puff, 1 puff, Inhalation, Daily, Danita Garcia MD, 1 puff at 07/27/23 0823    Labs & Results:      Results from last 7 days   Lab Units 07/26/23  0500 07/25/23  0507 07/24/23  0522   WBC Thousand/uL 6.74 6.54 7.03   HEMOGLOBIN g/dL 10.6* 10.5* 8.2*   HEMATOCRIT % 34.1* 34.3* 27.3*   PLATELETS Thousands/uL 232 238 192         Results from last 7 days   Lab Units 07/27/23  0506 07/26/23  0500 07/25/23  0507 07/24/23  0522 07/23/23  1430   POTASSIUM mmol/L 3.8 3.5 3.7   < > 4.2   CHLORIDE mmol/L 98 99 101   < > 107   CO2 mmol/L 32 35* 33*   < > 24   BUN mg/dL 41* 32* 25   < > 18   CREATININE mg/dL 2.25* 2.17* 1.81*   < > 1.40*   CALCIUM mg/dL 7.8* 8.2* 8.3   < > 8.2*   ALK PHOS U/L  --   --   --   --  107   ALT U/L  --   --   --   --  32   AST U/L  --   --   --   --  45    < > = values in this interval not displayed.          Anna James PA-C

## 2023-07-27 NOTE — ASSESSMENT & PLAN NOTE
Wt Readings from Last 3 Encounters:   07/27/23 (!) 156 kg (344 lb 5.7 oz)   07/18/23 (!) 158 kg (349 lb)   05/01/23 (!) 149 kg (327 lb 6.4 oz)     · Patient with diastolic heart failure, presented to ER with ongoing dyspnea and weight gain. · dry weight is 332 pounds. Patient reported he weighed 341 pounds 7/22 in am   · Mildly elevated proBNP, chest x-ray consistent with vascular congestion. · Patient's home medication is Bumex 4 mg twice daily with metolazone, patient is compliant. · Patient was initially started on IV Lasix but has been transitioned to Bumex gtt. per heart failure recommendations. · Had excellent response with Bumex gtt. , transition to p.o. Bumex on 7/26, dose reduced on 7/27. · Continue to monitor ins and outs. · Continue home medication Jardiance for now monitor Cr.   · Monitor electrolyte and replete to manage potassium above 4 and magnesium above 2.

## 2023-07-28 ENCOUNTER — TRANSITIONAL CARE MANAGEMENT (OUTPATIENT)
Dept: FAMILY MEDICINE CLINIC | Facility: CLINIC | Age: 58
End: 2023-07-28

## 2023-07-28 VITALS
RESPIRATION RATE: 16 BRPM | HEART RATE: 69 BPM | OXYGEN SATURATION: 100 % | TEMPERATURE: 98 F | WEIGHT: 315 LBS | DIASTOLIC BLOOD PRESSURE: 66 MMHG | SYSTOLIC BLOOD PRESSURE: 125 MMHG | HEIGHT: 73 IN | BODY MASS INDEX: 41.75 KG/M2

## 2023-07-28 LAB
ANION GAP SERPL CALCULATED.3IONS-SCNC: 6 MMOL/L
BUN SERPL-MCNC: 42 MG/DL (ref 5–25)
CALCIUM SERPL-MCNC: 7.7 MG/DL (ref 8.3–10.1)
CHLORIDE SERPL-SCNC: 101 MMOL/L (ref 96–108)
CO2 SERPL-SCNC: 28 MMOL/L (ref 21–32)
CREAT SERPL-MCNC: 2.19 MG/DL (ref 0.6–1.3)
ERYTHROCYTE [DISTWIDTH] IN BLOOD BY AUTOMATED COUNT: 14.7 % (ref 11.6–15.1)
GFR SERPL CREATININE-BSD FRML MDRD: 32 ML/MIN/1.73SQ M
GLUCOSE SERPL-MCNC: 189 MG/DL (ref 65–140)
GLUCOSE SERPL-MCNC: 189 MG/DL (ref 65–140)
GLUCOSE SERPL-MCNC: 237 MG/DL (ref 65–140)
HCT VFR BLD AUTO: 34.6 % (ref 36.5–49.3)
HGB BLD-MCNC: 10.3 G/DL (ref 12–17)
MCH RBC QN AUTO: 25.4 PG (ref 26.8–34.3)
MCHC RBC AUTO-ENTMCNC: 29.8 G/DL (ref 31.4–37.4)
MCV RBC AUTO: 85 FL (ref 82–98)
PLATELET # BLD AUTO: 242 THOUSANDS/UL (ref 149–390)
PMV BLD AUTO: 10.6 FL (ref 8.9–12.7)
POTASSIUM SERPL-SCNC: 4.3 MMOL/L (ref 3.5–5.3)
RBC # BLD AUTO: 4.06 MILLION/UL (ref 3.88–5.62)
SODIUM SERPL-SCNC: 135 MMOL/L (ref 135–147)
WBC # BLD AUTO: 7.84 THOUSAND/UL (ref 4.31–10.16)

## 2023-07-28 PROCEDURE — 80048 BASIC METABOLIC PNL TOTAL CA: CPT | Performed by: STUDENT IN AN ORGANIZED HEALTH CARE EDUCATION/TRAINING PROGRAM

## 2023-07-28 PROCEDURE — 99239 HOSP IP/OBS DSCHRG MGMT >30: CPT | Performed by: STUDENT IN AN ORGANIZED HEALTH CARE EDUCATION/TRAINING PROGRAM

## 2023-07-28 PROCEDURE — 85027 COMPLETE CBC AUTOMATED: CPT | Performed by: STUDENT IN AN ORGANIZED HEALTH CARE EDUCATION/TRAINING PROGRAM

## 2023-07-28 PROCEDURE — 82948 REAGENT STRIP/BLOOD GLUCOSE: CPT

## 2023-07-28 PROCEDURE — 99232 SBSQ HOSP IP/OBS MODERATE 35: CPT | Performed by: INTERNAL MEDICINE

## 2023-07-28 RX ORDER — BUMETANIDE 1 MG/1
5 TABLET ORAL 2 TIMES DAILY
Qty: 300 TABLET | Refills: 0 | Status: SHIPPED | OUTPATIENT
Start: 2023-07-28 | End: 2023-08-27

## 2023-07-28 RX ORDER — POTASSIUM CHLORIDE 20 MEQ/1
40 TABLET, EXTENDED RELEASE ORAL 2 TIMES DAILY
Qty: 120 TABLET | Refills: 0 | Status: SHIPPED | OUTPATIENT
Start: 2023-07-28 | End: 2023-08-27

## 2023-07-28 RX ADMIN — INSULIN LISPRO 3 UNITS: 100 INJECTION, SOLUTION INTRAVENOUS; SUBCUTANEOUS at 08:14

## 2023-07-28 RX ADMIN — BUDESONIDE AND FORMOTEROL FUMARATE DIHYDRATE 2 PUFF: 160; 4.5 AEROSOL RESPIRATORY (INHALATION) at 08:14

## 2023-07-28 RX ADMIN — PANTOPRAZOLE SODIUM 40 MG: 40 TABLET, DELAYED RELEASE ORAL at 08:13

## 2023-07-28 RX ADMIN — EMPAGLIFLOZIN 10 MG: 10 TABLET, FILM COATED ORAL at 08:14

## 2023-07-28 RX ADMIN — INSULIN LISPRO 3 UNITS: 100 INJECTION, SOLUTION INTRAVENOUS; SUBCUTANEOUS at 11:34

## 2023-07-28 RX ADMIN — METOPROLOL SUCCINATE 50 MG: 50 TABLET, EXTENDED RELEASE ORAL at 08:13

## 2023-07-28 RX ADMIN — POTASSIUM CHLORIDE 40 MEQ: 1500 TABLET, EXTENDED RELEASE ORAL at 08:13

## 2023-07-28 RX ADMIN — APIXABAN 5 MG: 5 TABLET, FILM COATED ORAL at 08:13

## 2023-07-28 RX ADMIN — UMECLIDINIUM 1 PUFF: 62.5 AEROSOL, POWDER ORAL at 08:14

## 2023-07-28 RX ADMIN — INSULIN LISPRO 1 UNITS: 100 INJECTION, SOLUTION INTRAVENOUS; SUBCUTANEOUS at 06:14

## 2023-07-28 RX ADMIN — BUMETANIDE 5 MG: 1 TABLET ORAL at 08:13

## 2023-07-28 NOTE — ASSESSMENT & PLAN NOTE
Start sliding scale    Recent Labs     07/27/23  1558 07/27/23  2106 07/28/23  0543 07/28/23  1054   POCGLU 208* 210* 189* 237*       Blood Sugar Average: Last 72 hrs:  (P) 178.6805755280100230   · Hold home medication Januvia  · Jardiance continued. · Patient was started on Lantus and sliding scale while in hospital.  · We will continue Januvia and Jardiance on discharge, as per patient his blood sugars are always in 130s on these both medications at home. · Encouraged to follow-up with PCP within 1 week of discharge.

## 2023-07-28 NOTE — ASSESSMENT & PLAN NOTE
Wt Readings from Last 3 Encounters:   07/28/23 (!) 156 kg (344 lb 5.7 oz)   07/18/23 (!) 158 kg (349 lb)   05/01/23 (!) 149 kg (327 lb 6.4 oz)     · Patient with diastolic heart failure, presented to ER with ongoing dyspnea and weight gain. · dry weight is 332 pounds. Patient reported he weighed 341 pounds 7/22 in am   · Mildly elevated proBNP, chest x-ray consistent with vascular congestion. · Patient's home medication is Bumex 4 mg twice daily with metolazone, patient is compliant. · Patient was initially started on IV Lasix but has been transitioned to Bumex gtt. per heart failure recommendations. · Had excellent response with Bumex gtt. , transition to p.o. Bumex on 7/26, dose reduced on 7/27. · Patient had a good urine output, weight remained stable. Will be discharged on Bumex 5 mg twice daily.   · Follow-up with heart failure team in outpatient setting

## 2023-07-28 NOTE — PROGRESS NOTES
Advanced Heart Failure / Pulmonary Hypertension Service Progress Note    Mariela Escobar 66 y.o. male   MRN: [id number]  Unit/Bed#: [facility] 411-01; Encounter: [id number]    Assessment:  Principal Problem:    Acute on chronic diastolic heart failure (HCC)  Active Problems:    Primary hypertension    Type 2 diabetes mellitus, without long-term current use of insulin (HCC)    Paroxysmal atrial fibrillation (HCC)    Stage 3a chronic kidney disease ([address])    Severe persistent asthma without complication      Subjective:   Mariela Escobar is a 16-year-old man with PMH as below who presented to [facility] on 07/23/2023 with worsening SOB/TELLES, weight gain, and decreased urinary response.  (no priors). CXR with "slight increased pulmonary vascular congestion." Started on IV Lasix, and cardiology consulted.      Patient seen and examined. No significant events overnight. No current cardiac complaints. Objective: Intake/ Output: 360 mL / 3275 mL. Intake accurate? Weight: 344 lbs (344 lbs on 07/27). Dry weight TBD (in 340s?). Telemetry: Not on telemetry. Today's Plan:  • Continue PO Bumex 5 mg BID. • MEMS reading this AM with PAd of 16 (goal of 15). • Hemoglobin 10.3 mg/dL yesterday (goal Hgb >8 mg/dL given cardiac history). Continue to monitor. • Stable for discharge home today from HF standpoint. Hospital follow-up scheduled for 08/04/2023. • Recommend repeating BMP in 5-7 days. Plan:  Acute on chronic HFpEF; LVEF 50-55%; LVIDd 6.6 cm; NYHA II; ACC/AHA Stage C              Etiology: Afib, HTN, obesity phenotype.              TTE 11/21/2021: LVEF 55%. LVIDd 6.0 cm. Grade 1 DD. Normal RV. Trace TR.             [facility] 09/06/2022: no obstructive CAD. TTE 04/11/2023: LVEF 50%. LVIDd 6.6 cm. Grade 2 DD. Normal RV. Trace MR and TR. Normal IVC.      Pharmacotherapies / Neurohormonal Blockade:  --Beta Blocker: metoprolol succinate 50 mg daily.   --ARNi / ACEi / ARB: No.  --Aldosterone Antagonist: No.  --SGLT2 Inhibitor: empagliflozin 10 mg daily. --Home Diuretic: Bumex 4 mg BID with potassium 40 mEq BID with PRN metolazone 2.5 mg.   --Inpatient Diuretic: PO Bumex 5 mg BID with potassium 40 mEq BID.                 Advanced Therapies:              --CardioMEMS: implanted 03/09/2022. Goal PAd of 15. Atrial fibrillation, parosxysmal              [id number] = 3 (HF, HTN, DM).             Anticoagulation on Eliquis.             Rate control: BB as above.              Rhythm control: No.     Chronic kidney disease, stage IIIb              Baseline creatinine of 1.5-2.0. Today, creatinine of 2.17 (2.25 on 07/27).    Hypertension  Asthma, severe persistent: follows with Dr. Fan Meyers as outpatient. Obstructive sleep apnea: without CPAP and equipment for ~12 months. Planning to follow-up with device company s/p discharge in attempt to get CPAP machine. Diabetes mellitus, type II  History of gastric bypass (Samuel-en-Y) surgery   History of tobacco abuse     Vitals:   Blood pressure 125/66, pulse 69, temperature 98 °F (36.7 °C), temperature source Oral, resp. rate 16, height 6' 1" (1.854 m), weight (!) 156 kg (344 lb 5.7 oz), SpO2 100 %. I/O last 3 completed shifts:   In: 660 [P.O.:660]  Out: 4425 [Urine:4425]    Weight (last 2 days)     Date/Time Weight    07/28/23 0600 156 (344.36)    07/27/23 0600 156 (344.36)    07/26/23 0600 155 (340.83)        Wt Readings from Last 10 Encounters:   07/28/23 (!) 156 kg (344 lb 5.7 oz)   07/18/23 (!) 158 kg (349 lb)   05/01/23 (!) 149 kg (327 lb 6.4 oz)   04/18/23 (!) 147 kg (323 lb 13.7 oz)   04/10/23 (!) 154 kg (339 lb 11.2 oz)   03/21/23 (!) 151 kg (332 lb 0.2 oz)   03/15/23 (!) 151 kg (333 lb)   12/28/22 (!) 148 kg (326 lb)   12/27/22 (!) 147 kg (324 lb)   12/20/22 (!) 147 kg (324 lb 9.6 oz)     Vitals:    07/28/23 0311 07/28/23 0600 07/28/23 0713 07/28/23 1100   BP: 128/60  142/71 125/66   BP Location: Right arm  Right arm Right arm   Pulse: 68  69 Resp: 14  15 16   Temp: 97.6 °F (36.4 °C)  98 °F (36.7 °C) 98 °F (36.7 °C)   TempSrc: Oral  Oral Oral   SpO2: 98%  92% 100%   Weight:  (!) 156 kg (344 lb 5.7 oz)     Height:           Physical Exam  Vitals reviewed. Constitutional:       General: He is awake. He is not in acute distress. Appearance: Normal appearance. He is well-developed and overweight. He is not toxic-appearing or diaphoretic. HENT:      Head: Normocephalic. Nose: Nose normal.      Mouth/Throat:      Mouth: Mucous membranes are moist.   Eyes:      General: No scleral icterus. Conjunctiva/sclera: Conjunctivae normal.   Neck:      Trachea: No tracheal deviation. Cardiovascular:      Rate and Rhythm: Normal rate and regular rhythm. No extrasystoles are present. Heart sounds: No murmur heard. Pulmonary:      Effort: Pulmonary effort is normal. No tachypnea, bradypnea or respiratory distress. Breath sounds: Normal air entry. No decreased air movement. No decreased breath sounds or wheezing. Abdominal:      Palpations: Abdomen is soft. Tenderness: There is no abdominal tenderness. Musculoskeletal:      Cervical back: Neck supple. Right lower leg: Edema (trace) present. Left lower leg: Edema (trace) present. Skin:     General: Skin is warm and dry. Coloration: Skin is pale. Skin is not jaundiced. Neurological:      General: No focal deficit present. Mental Status: He is alert and oriented to person, place, and time. Psychiatric:         Attention and Perception: Attention normal.         Mood and Affect: Mood and affect normal.         Speech: Speech normal.         Behavior: Behavior normal. Behavior is cooperative. Thought Content:  Thought content normal.       Lines/Drains/Airways     Active Status     None                Current Facility-Administered Medications:   •  acetaminophen (TYLENOL) tablet 650 mg, 650 mg, Oral, Q6H PRN, Zoila Rodriguez MD  •  albuterol (PROVENTIL HFA,VENTOLIN HFA) inhaler 2 puff, 2 puff, Inhalation, Q4H PRN, Bhargavi Aden MD  •  albuterol inhalation solution 2.5 mg, 2.5 mg, Nebulization, Q4H PRN, Bhargavi Aden MD  •  apixaban (ELIQUIS) tablet 5 mg, 5 mg, Oral, BID, Devang Ingram MD, 5 mg at 07/28/23 0813  •  atorvastatin (LIPITOR) tablet 10 mg, 10 mg, Oral, Daily, Devang Inrgam MD, 10 mg at 07/27/23 1827  •  budesonide-formoterol (SYMBICORT) 160-4.5 mcg/act inhaler 2 puff, 2 puff, Inhalation, BID, Bhargavi Aden MD, 2 puff at 07/28/23 0814  •  bumetanide (BUMEX) tablet 5 mg, 5 mg, Oral, BID (diuretic), Garrison García PA-C, 5 mg at 07/28/23 0813  •  Empagliflozin (JARDIANCE) tablet 10 mg, 10 mg, Oral, Daily, Devang Ingram MD, 10 mg at 07/28/23 0814  •  insulin glargine (LANTUS) subcutaneous injection 5 Units 0.05 mL, 5 Units, Subcutaneous, HS, Jaylon Garcias MD, 5 Units at 07/27/23 2117  •  insulin lispro (HumaLOG) 100 units/mL subcutaneous injection 1-5 Units, 1-5 Units, Subcutaneous, HS, Devang Ingram MD, 2 Units at 07/27/23 2119  •  insulin lispro (HumaLOG) 100 units/mL subcutaneous injection 1-6 Units, 1-6 Units, Subcutaneous, TID AC, 3 Units at 07/28/23 1134 **AND** Fingerstick Glucose (POCT), , , TID AC, Devang Ingram MD  •  insulin lispro (HumaLOG) 100 units/mL subcutaneous injection 3 Units, 3 Units, Subcutaneous, TID With Meals, Jaylno Garcias MD, 3 Units at 07/28/23 1134  •  metoprolol succinate (TOPROL-XL) 24 hr tablet 50 mg, 50 mg, Oral, Daily, Devang Ingram MD, 50 mg at 07/28/23 0813  •  montelukast (SINGULAIR) tablet 10 mg, 10 mg, Oral, HS, Devang Ingram MD, 10 mg at 07/27/23 2117  •  ondansetron (ZOFRAN) injection 4 mg, 4 mg, Intravenous, Q6H PRN, Bhargavi Aden MD  •  pantoprazole (PROTONIX) EC tablet 40 mg, 40 mg, Oral, Daily, Devang Ingram MD, 40 mg at 07/28/23 0813  •  potassium chloride (K-DUR,KLOR-CON) CR tablet 40 mEq, 40 mEq, Oral, BID, Jackson Downey PA-C, 40 mEq at 07/28/23 0813  •  pregabalin (LYRICA) capsule 50 mg, 50 mg, Oral, HS, Troy Delong MD, 50 mg at 07/27/23 2117  •  umeclidinium 62.5 mcg/actuation inhaler AEPB 1 puff, 1 puff, Inhalation, Daily, Troy Delong MD, 1 puff at 07/28/23 0814    Labs & Results:      Results from last 7 days   Lab Units 07/28/23  0540 07/26/23  0500 07/25/23  0507   WBC Thousand/uL 7.84 6.74 6.54   HEMOGLOBIN g/dL 10.3* 10.6* 10.5*   HEMATOCRIT % 34.6* 34.1* 34.3*   PLATELETS Thousands/uL 242 232 238         Results from last 7 days   Lab Units 07/28/23  0540 07/27/23  0506 07/26/23  0500 07/24/23  0522 07/23/23  1430   POTASSIUM mmol/L 4.3 3.8 3.5   < > 4.2   CHLORIDE mmol/L 101 98 99   < > 107   CO2 mmol/L 28 32 35*   < > 24   BUN mg/dL 42* 41* 32*   < > 18   CREATININE mg/dL 2.19* 2.25* 2.17*   < > 1.40*   CALCIUM mg/dL 7.7* 7.8* 8.2*   < > 8.2*   ALK PHOS U/L  --   --   --   --  107   ALT U/L  --   --   --   --  32   AST U/L  --   --   --   --  45    < > = values in this interval not displayed.          Rakesh Reynolds PA-C

## 2023-07-28 NOTE — ASSESSMENT & PLAN NOTE
· follows with Dr. Sarah Tolbert as outpatient.   · No evidence of exacerbation  · Continue inhalers while inpatient

## 2023-07-28 NOTE — ASSESSMENT & PLAN NOTE
· Rate controlled on metoprolol, anticoagulated with Eliquis  · Heart rate regular. · Will need echocardiogram once volume optimized. · Patient to follow-up with cardiology in outpatient setting.

## 2023-07-28 NOTE — ASSESSMENT & PLAN NOTE
Lab Results   Component Value Date    EGFR 32 07/28/2023    EGFR 31 07/27/2023    EGFR 32 07/26/2023    CREATININE 2.19 (H) 07/28/2023    CREATININE 2.25 (H) 07/27/2023    CREATININE 2.17 (H) 07/26/2023     · Baseline appears to be 1.5-1.7; patient currently at baseline, creatinine has been trending up likely in setting of IV diuresis. Monitor renal function. Diuretics dose reduced.   Creatinine stable at 2.19.  · Monitor for electrolytes, replete potassium as needed

## 2023-07-28 NOTE — DISCHARGE SUMMARY
[address] [facility]  Discharge- Brunilda Walker 1965, 66 y.o. male MRN: [id number]  Unit/Bed#: [facility] 411-01 Encounter: [id number]  Primary Care Provider: Anai Narayan MD   Date and time admitted to hospital: 7/23/2023  1:40 PM    * Acute on chronic diastolic heart failure [facility])  Assessment & Plan  Wt Readings from Last 3 Encounters:   07/28/23 (!) 156 kg (344 lb 5.7 oz)   07/18/23 (!) 158 kg (349 lb)   05/01/23 (!) 149 kg (327 lb 6.4 oz)     · Patient with diastolic heart failure, presented to ER with ongoing dyspnea and weight gain. · dry weight is 332 pounds. Patient reported he weighed 341 pounds 7/22 in am   · Mildly elevated proBNP, chest x-ray consistent with vascular congestion. · Patient's home medication is Bumex 4 mg twice daily with metolazone, patient is compliant. · Patient was initially started on IV Lasix but has been transitioned to Bumex gtt. per heart failure recommendations. · Had excellent response with Bumex gtt. , transition to p.o. Bumex on 7/26, dose reduced on 7/27. · Patient had a good urine output, weight remained stable. Will be discharged on Bumex 5 mg twice daily. · Follow-up with heart failure team in outpatient setting    Severe persistent asthma without complication  Assessment & Plan  · follows with Dr. Melinda Guzman as outpatient. · No evidence of exacerbation  · Continue inhalers while inpatient     Stage 3a chronic kidney disease [facility])  Assessment & Plan  Lab Results   Component Value Date    EGFR 32 07/28/2023    EGFR 31 07/27/2023    EGFR 32 07/26/2023    CREATININE 2.19 (H) 07/28/2023    CREATININE 2.25 (H) 07/27/2023    CREATININE 2.17 (H) 07/26/2023     · Baseline appears to be 1.5-1.7; patient currently at baseline, creatinine has been trending up likely in setting of IV diuresis. Monitor renal function. Diuretics dose reduced.   Creatinine stable at 2.19.  · Monitor for electrolytes, replete potassium as needed    Paroxysmal atrial fibrillation ([facility])  Assessment & Plan  · Rate controlled on metoprolol, anticoagulated with Eliquis  · Heart rate regular. · Will need echocardiogram once volume optimized. · Patient to follow-up with cardiology in outpatient setting. Type 2 diabetes mellitus, without long-term current use of insulin [facility])  Assessment & Plan  Start sliding scale    Recent Labs     07/27/23  1558 07/27/23  2106 07/28/23  0543 07/28/23  1054   POCGLU 208* 210* 189* 237*       Blood Sugar Average: Last 72 hrs:  (P) [id number]   · Hold home medication Januvia  · Jardiance continued. · Patient was started on Lantus and sliding scale while in hospital.  · We will continue Januvia and Jardiance on discharge, as per patient his blood sugars are always in 130s on these both medications at home. · Encouraged to follow-up with PCP within 1 week of discharge. Primary hypertension  Assessment & Plan  · BP acceptable, continue to monitor blood pressure while on IV diuresis  · Continue home medication metoprolol  · Routine vitals per unit         Medical Problems     Resolved Problems  Date Reviewed: 7/28/2023   None       Discharging Physician / Practitioner: Kelsey Hobbs MD  PCP: Martin Zheng MD  Admission Date:   Admission Orders (From admission, onward)     Ordered        07/23/23 1837  INPATIENT ADMISSION  Once                      Discharge Date: 07/28/23    Consultations During Hospital Stay:  · Cardiology. Procedures Performed:   · no    Significant Findings / Test Results:   · CXR:      IMPRESSION:     Slight increased pulmonary vascular    Incidental Findings:   ·     Test Results Pending at Discharge (will require follow up):   · no     Outpatient Tests Requested:  · no    Complications:  no    Reason for Admission: Acute on chronic CHF exacerbation.     Hospital Course:   Chery Adler is a 66 y.o. male patient who originally presented to the hospital on 7/23/2023 due to acute on chronic diastolic heart failure. Patient evaluated by heart failure team, was started on Bumex gtt. that was transition to Bumex p.o. During his hospital stay his creatinine was noted to be fluctuating around 2, that was expected in setting of IV diuresis. His Bumex dose was adjusted, this morning his creatinine and weight remained stable. Discussed with cardiology, patient will be discharged on Bumex 5 mg twice daily, along with metolazone as needed. He will continue potassium supplements. Patient will need to follow-up with PCP and cardiology in outpatient setting. Patient was encouraged to get BMP in 1 week postdischarge once he follows with PCP. Please see above list of diagnoses and related plan for additional information. Condition at Discharge: stable    Discharge Day Visit / Exam:   Subjective: Patient examined at bedside, denies any active complaints. Had good urine output with Bumex p.o. Vitals: Blood Pressure: 125/66 (07/28/23 1100)  Pulse: 69 (07/28/23 0713)  Temperature: 98 °F (36.7 °C) (07/28/23 1100)  Temp Source: Oral (07/28/23 1100)  Respirations: 16 (07/28/23 1100)  Height: 6' 1" (185.4 cm) (07/25/23 0600)  Weight - Scale: (!) 156 kg (344 lb 5.7 oz) (07/28/23 0600)  SpO2: 100 % (07/28/23 1100)  Exam:   Physical Exam  Constitutional:       Appearance: He is obese. HENT:      Head: Normocephalic. Mouth/Throat:      Mouth: Mucous membranes are moist.      Pharynx: Oropharynx is clear. Eyes:      Conjunctiva/sclera: Conjunctivae normal.      Pupils: Pupils are equal, round, and reactive to light. Cardiovascular:      Rate and Rhythm: Normal rate and regular rhythm. Pulses: Normal pulses. Heart sounds: Normal heart sounds. Pulmonary:      Effort: Pulmonary effort is normal.      Breath sounds: Normal breath sounds. Abdominal:      General: Bowel sounds are normal.      Palpations: Abdomen is soft. Musculoskeletal:         General: Normal range of motion.    Skin:     General: Skin is warm and dry. Neurological:      General: No focal deficit present. Mental Status: He is alert and oriented to person, place, and time. Psychiatric:         Mood and Affect: Mood normal.         Behavior: Behavior normal.          Discussion with Family: Updated patient at bedside. [place] Discharge instructions/Information to patient and family:   See after visit summary for information provided to patient and family. Provisions for Follow-Up Care:  See after visit summary for information related to follow-up care and any pertinent home health orders. Disposition:   Home    Planned Readmission: no     Discharge Statement:  I spent 40 minutes discharging the patient. This time was spent on the day of discharge. I had direct contact with the patient on the day of discharge. Greater than 50% of the total time was spent examining patient, answering all patient questions, arranging and discussing plan of care with patient as well as directly providing post-discharge instructions. Additional time then spent on discharge activities. Discharge Medications:  See after visit summary for reconciled discharge medications provided to patient and/or family.       **Please Note: This note may have been constructed using a voice recognition system**

## 2023-07-31 ENCOUNTER — TELEPHONE (OUTPATIENT)
Dept: CARDIOLOGY CLINIC | Facility: CLINIC | Age: 58
End: 2023-07-31

## 2023-07-31 NOTE — UTILIZATION REVIEW
NOTIFICATION OF ADMISSION DISCHARGE   This is a Notification of Discharge from [facility]. Please be advised that this patient has been discharge from our facility. Below you will find the admission and discharge date and time including the patient’s disposition. UTILIZATION REVIEW CONTACT:  Brett Dumont  Utilization   Network Utilization Review Department  Phone: [phone] x carefully listen to the prompts. All voicemails are confidential.  Email: [email or web address]     ADMISSION INFORMATION  PRESENTATION DATE: 7/23/2023  1:40 PM  OBERVATION ADMISSION DATE:   INPATIENT ADMISSION DATE: 7/23/23  6:37 PM   DISCHARGE DATE: 7/28/2023 12:46 PM   DISPOSITION:Home/Self Care    IMPORTANT INFORMATION:  Send all requests for admission clinical reviews, approved or denied determinations and any other requests to dedicated fax number below belonging to the campus where the patient is receiving treatment.  List of dedicated fax numbers:  [place] DENIALS (Administrative/Medical Necessity) [phone] [address] [facility] (Maternity/NICU/Pediatrics) [phone]   [facility] [phone]   [facility] [phone] [address] [facility] [phone]   [address] [place] [address] [phone]   [facility] [phone]   [address] [place] [address] [address] [place] [address] [phone]   [address] [place] [address] [phone]   [facility] [phone]   [address] [facility] [address] [facility] [phone]

## 2023-08-01 ENCOUNTER — TELEPHONE (OUTPATIENT)
Dept: CARDIOLOGY CLINIC | Facility: CLINIC | Age: 58
End: 2023-08-01

## 2023-08-01 NOTE — TELEPHONE ENCOUNTER
Wife called, LMMARCELA, and said you advised he get an automatic respirator. Called her back, no answer, LOLA for her to call back.

## 2023-08-01 NOTE — TELEPHONE ENCOUNTER
Sorry I heard the message wrong, she wanted to know if he needs an automatic restorator. A pedal machine? The Cpap machine , they have been trying to get the new machine. [employer] does not even answer the phone.

## 2023-08-01 NOTE — TELEPHONE ENCOUNTER
Wife called back. She states her boss is a Physical therapist and gave her a respirator. It is smooth and he just has to sit and use it. She wants to make sure that is ok for him to use?     Please advise

## 2023-08-02 ENCOUNTER — CLINICAL SUPPORT (OUTPATIENT)
Dept: CARDIOLOGY CLINIC | Facility: CLINIC | Age: 58
End: 2023-08-02
Payer: COMMERCIAL

## 2023-08-02 DIAGNOSIS — I50.32 CHRONIC HEART FAILURE WITH PRESERVED EJECTION FRACTION (HFPEF) (HCC): Chronic | ICD-10-CM

## 2023-08-02 PROCEDURE — 93264 REM MNTR WRLS P-ART PRS SNR: CPT | Performed by: INTERNAL MEDICINE

## 2023-08-02 NOTE — TELEPHONE ENCOUNTER
Advised wife the pedal machine was fine for him to exercise with. mailed disability forms and placard form to his wife. Will call sleep medicine to find out about cpap machine.

## 2023-08-04 ENCOUNTER — OFFICE VISIT (OUTPATIENT)
Dept: CARDIOLOGY CLINIC | Facility: CLINIC | Age: 58
End: 2023-08-04
Payer: COMMERCIAL

## 2023-08-04 ENCOUNTER — APPOINTMENT (OUTPATIENT)
Dept: LAB | Facility: CLINIC | Age: 58
End: 2023-08-04
Payer: COMMERCIAL

## 2023-08-04 VITALS
DIASTOLIC BLOOD PRESSURE: 70 MMHG | OXYGEN SATURATION: 96 % | BODY MASS INDEX: 45.91 KG/M2 | WEIGHT: 315 LBS | HEART RATE: 84 BPM | SYSTOLIC BLOOD PRESSURE: 110 MMHG

## 2023-08-04 DIAGNOSIS — I50.32 CHRONIC HEART FAILURE WITH PRESERVED EJECTION FRACTION (HCC): ICD-10-CM

## 2023-08-04 DIAGNOSIS — G47.33 OBSTRUCTIVE SLEEP APNEA: ICD-10-CM

## 2023-08-04 DIAGNOSIS — Z09 HOSPITAL DISCHARGE FOLLOW-UP: Primary | ICD-10-CM

## 2023-08-04 DIAGNOSIS — N18.32 STAGE 3B CHRONIC KIDNEY DISEASE (HCC): ICD-10-CM

## 2023-08-04 DIAGNOSIS — I50.33 ACUTE ON CHRONIC HEART FAILURE WITH PRESERVED EJECTION FRACTION (HCC): ICD-10-CM

## 2023-08-04 DIAGNOSIS — Z95.818 PRESENCE OF CARDIOMEMS HF SYSTEM: ICD-10-CM

## 2023-08-04 DIAGNOSIS — I48.0 PAROXYSMAL ATRIAL FIBRILLATION (HCC): ICD-10-CM

## 2023-08-04 LAB
ANION GAP SERPL CALCULATED.3IONS-SCNC: 8 MMOL/L
BNP SERPL-MCNC: 122 PG/ML (ref 0–100)
BUN SERPL-MCNC: 28 MG/DL (ref 5–25)
CALCIUM SERPL-MCNC: 8.9 MG/DL (ref 8.3–10.1)
CHLORIDE SERPL-SCNC: 102 MMOL/L (ref 96–108)
CO2 SERPL-SCNC: 28 MMOL/L (ref 21–32)
CREAT SERPL-MCNC: 1.73 MG/DL (ref 0.6–1.3)
GFR SERPL CREATININE-BSD FRML MDRD: 43 ML/MIN/1.73SQ M
GLUCOSE P FAST SERPL-MCNC: 209 MG/DL (ref 65–99)
POTASSIUM SERPL-SCNC: 4.2 MMOL/L (ref 3.5–5.3)
SODIUM SERPL-SCNC: 138 MMOL/L (ref 135–147)

## 2023-08-04 PROCEDURE — 99214 OFFICE O/P EST MOD 30 MIN: CPT | Performed by: PHYSICIAN ASSISTANT

## 2023-08-04 PROCEDURE — 36415 COLL VENOUS BLD VENIPUNCTURE: CPT

## 2023-08-04 PROCEDURE — 83880 ASSAY OF NATRIURETIC PEPTIDE: CPT

## 2023-08-04 PROCEDURE — 80048 BASIC METABOLIC PNL TOTAL CA: CPT

## 2023-08-04 NOTE — PATIENT INSTRUCTIONS
Continue current medications. Please weigh yourself every day (after emptying your bladder) and keep a detailed log of weights. Contact the Heart Failure program at [phone] if you gain 3+ lbs overnight or 5+ lbs in 5-7 days. Limit daily sodium/salt intake to 2000 mg daily to prevent fluid retention. Avoid canned foods, fast food/Chinese food, and processed meats (hot dogs, lunch meat, and sausage etc.). Caution with condiments. Limit fluid intake to 2000 mL or 2 liters (about 60-65 ounces) daily. Avoid electrolyte replacement drinks (such as Gatorade, Pedialyte, Propel, Liquid IV, etc.). Bring complete list of medications and log of daily weights to your follow-up appointment.

## 2023-08-04 NOTE — PROGRESS NOTES
Advanced Heart Failure / Pulmonary Hypertension Outpatient Progress Note    Leeroy Senate 66 y.o. male   MRN: [id number]  Encounter: [id number]    Assessment:  Patient Active Problem List    Diagnosis Date Noted   • Severe persistent asthma without complication 88/43/8248   • Loose stools 04/17/2023   • Diabetic polyneuropathy associated with type 2 diabetes mellitus ([address]) 12/20/2022   • Stage 3a chronic kidney disease ([address]) 09/16/2022   • Paroxysmal atrial fibrillation ([address]) 03/01/2022   • Chronic heart failure with preserved ejection fraction (HFpEF) ([address]) 11/12/2021   • Severe persistent asthma 10/11/2021   • Hyponatremia 07/31/2021   • HNP (herniated nucleus pulposus), lumbar 02/23/2021   • Foraminal stenosis of lumbar region 02/23/2021   • VICKY (obstructive sleep apnea)    • Mixed hyperlipidemia 04/18/2019   • Primary osteoarthritis of both knees 06/14/2018   • Irritable bowel syndrome with diarrhea 01/30/2018   • Primary hypertension 01/30/2018   • Type 2 diabetes mellitus, without long-term current use of insulin ([address]) 01/30/2018   • Vitamin B12 deficiency 03/08/2016   • Vitamin D deficiency 03/08/2016   • Morbid obesity due to excess calories ([address]) 02/23/2016   • Primary osteoarthritis of right knee 10/15/2013       Today's Plan:  • Continue current medications. • CardioMEMS PAd reading of 13 this AM (goal of 15). • Reviewed lab results; BNP coming down and renal function improved since discharge. Plan:  Chronic HFpEF; LVEF 55%; LVIDd 6.0 cm; NYHA II; ACC/AHA Stage C   Etiology: Afib, HTN, obesity phenotype. TTE 11/21/2021: LVEF 55%. LVIDd 6.0 cm. Grade 1 DD. Normal RV. Trace TR.     LHC 09/06/2022: no obstructive CAD. TTE 04/11/2023: LVEF 50%. LVIDd 6.6 cm. Grade 2 DD. Normal RV. Trace MR and TR. Normal IVC. Weight of 344 lbs on 07/28 (day of discharge). Today, weighs 348 lbs. Most recent BMP from 08/04/2023: sodium 138; potassium 4.2; BUN 28; creatinine 1.73; eGFR 43.     Pharmacotherapies / Neurohormonal Blockade:  --Beta Blocker: metoprolol succinate 50 mg daily. --ARNi / ACEi / ARB: No.  --Aldosterone Antagonist: No.  --SGLT2 Inhibitor: empagliflozin 10 mg daily. --Diuretic: Bumex 5 mg BID with potassium 40 mEq BID with PRN metolazone 2.5 mg. Advanced Therapies:   --CardioMEMS: implanted 03/09/2022. Goal PAd of 15. Atrial fibrillation, parosxysmal   [id number] = 3 (HF, HTN, DM). Anticoagulation on Eliquis. Rate control: BB as above. Rhythm control: No.    Hypertension   BP of 110/70 mmHg in office today. Continues on medications as above. Chronic kidney disease, stage IIIb   Baseline creatinine of 1.5-2.0. Most recent BMP from 08/04/2023: sodium 138; potassium 4.2; BUN 28; creatinine 1.73; eGFR 43. Asthma, severe persistent: follows with Dr. Petra Salter as outpatient. Obstructive sleep apnea: without CPAP and equipment for ~12 months. Planning to follow-up with device company s/p discharge in attempt to get CPAP machine. Diabetes mellitus, type II  History of gastric bypass (Samuel-en-Y) surgery   History of tobacco abuse    HPI:   Mariela Escobar is a 21-year-old man with a PMH as above who presents to the office for follow-up visit. Follows with Dr. Brady Adam. PAd elevated on 07/03; advised to take metolazone 2.5 x1 and increase Bumex to 4 mg BID. Advised again to take metolazone 2.5 mg x1 on 07/14/2023 (PAd of 20). 07/18/2023: Patient presents for an acute visit. Up 22 lbs weight gain since being discharged in 04/2023 with reported 6 lbs gain overnight on home scale. Reports worsening TELLES, bendopnea, LE swelling and fatigue. Feeling winded when showering. Continues with chronic orthopnea; denies PND. Reports losing only 1-2 lbs after taking 2.5 mg metolazone. Denies recent illness or recent dietary indiscretions. Admitted to [facility] from 07/23 to 07/28/2023 after presenting with worsening SOB/TELLES, weight gain, and decreased urinary response.   (no priors). CXR with "slight increased pulmonary vascular congestion." Started on IV Lasix, and cardiology consulted. Switched to Bumex drip on 07/24. Transitioned to PO Bumex on 07/26. MEMS pillow brought in from home. PAd reading of 13 on 07/27; PO Bumex dose decreased to 5 mg BID. PAd of 16 on 07/28. Lost 6 lbs this admission. 08/04/2023: Patient presents for hospital follow-up. Feeling well. Weights stable on home scale at 342 lbs. Has not required/taken metolazone since being home. Denies TELLES, LE swelling, PND, and orthopnea. Past Medical History:   Diagnosis Date   • Acute gastritis without hemorrhage     last assessed 3/24/17   • Asthma    • Atrial fibrillation [facility])    • Bilateral leg edema 02/19/2020   • CHF (congestive heart failure) ([facility])    • Coronary artery disease    • Daytime sleepiness 07/17/2019   • Diabetes mellitus ([address])    • Dyspnea on exertion 10/11/2021   • Edema of both feet 01/23/2020   • Gastric bypass status for obesity    • Hyperlipidemia    • Hypertension    • Hypokalemia 11/14/2021   • Impaired fasting glucose     last assessed 5/10/17   • Knee sprain, bilateral 06/14/2018   • Leg cramps 06/16/2022   • Obesity    • Viral gastroenteritis     last assessed 11/4/16       Review of Systems   Constitutional: Negative for activity change, appetite change, fatigue and unexpected weight change. Eyes: Negative. Respiratory: Negative for cough, chest tightness and shortness of breath. Cardiovascular: Negative for chest pain, palpitations and leg swelling. Gastrointestinal: Negative for abdominal distention, abdominal pain, diarrhea and nausea. Endocrine: Negative. Genitourinary: Negative for decreased urine volume, difficulty urinating, dysuria and urgency. Musculoskeletal: Negative. Skin: Negative. Allergic/Immunologic: Negative. Neurological: Negative for dizziness, syncope, weakness and light-headedness.    Psychiatric/Behavioral: Negative for confusion and sleep disturbance. The patient is not nervous/anxious. Allergies   Allergen Reactions   • Azithromycin Other (See Comments)     Shaky, uneasy feeling    • Bactrim [Sulfamethoxazole-Trimethoprim] Other (See Comments)     shakiness         Current Outpatient Medications:   •  Accu-Chek FastClix Lancets MISC, Test blood sugar twice daily, Disp: 200 each, Rfl: 3  •  albuterol (PROVENTIL HFA,VENTOLIN HFA) 90 mcg/act inhaler, INHALE 2 PUFFS EVERY 4 (FOUR) HOURS AS NEEDED FOR WHEEZING OR SHORTNESS OF BREATH, Disp: 18 g, Rfl: 3  •  apixaban (Eliquis) 5 mg, Take 1 tablet (5 mg total) by mouth 2 (two) times a day, Disp: 60 tablet, Rfl: 5  •  atorvastatin (LIPITOR) 10 mg tablet, TAKE 1 TABLET BY MOUTH EVERY DAY, Disp: 90 tablet, Rfl: 2  •  benzonatate (TESSALON PERLES) 100 mg capsule, Take 1 capsule (100 mg total) by mouth 3 (three) times a day as needed (for cough), Disp: 30 capsule, Rfl: 0  •  Blood Glucose Monitoring Suppl (Accu-Chek Guide) w/Device KIT, Use 2 (two) times a day Test blood sugar twice daily, Disp: 1 kit, Rfl: 0  •  budesonide-formoterol (Symbicort) 160-4.5 mcg/act inhaler, Inhale 2 puffs 2 (two) times a day Rinse mouth after use., Disp: 30.6 g, Rfl: 2  •  bumetanide (BUMEX) 1 mg tablet, Take 5 tablets (5 mg total) by mouth 2 (two) times a day, Disp: 300 tablet, Rfl: 0  •  Empagliflozin (JARDIANCE) 10 MG TABS tablet, Take 1 tablet (10 mg total) by mouth daily, Disp: 30 tablet, Rfl: 11  •  Fasenra Pen 30 MG/ML SOAJ, , Disp: , Rfl:   •  levalbuterol (Xopenex) 1.25 mg/3 mL nebulizer solution, Take 3 mL (1.25 mg total) by nebulization as needed for wheezing or shortness of breath, Disp: , Rfl:   •  metolazone (ZAROXOLYN) 2.5 mg tablet, Take 1 tablet by mouth as directed by provider. Take 20-30 minutes before Bumex dose and with additional potassium. , Disp: 90 tablet, Rfl: 1  •  metoprolol succinate (TOPROL-XL) 50 mg 24 hr tablet, Take 1 tablet (50 mg total) by mouth daily, Disp: 90 tablet, Rfl: 3  •  montelukast (SINGULAIR) 10 mg tablet, Take 1 tablet (10 mg total) by mouth daily at bedtime, Disp: 90 tablet, Rfl: 1  •  pantoprazole (PROTONIX) 40 mg tablet, Take 1 tablet (40 mg total) by mouth daily, Disp: 21 tablet, Rfl: 0  •  potassium chloride (K-DUR,KLOR-CON) 20 mEq tablet, Take 2 tablets (40 mEq total) by mouth 2 (two) times a day, Disp: 120 tablet, Rfl: 0  •  pregabalin (LYRICA) 50 mg capsule, Take 1 caps. at bedtime, Disp: 30 capsule, Rfl: 5  •  sitaGLIPtin (Januvia) 100 mg tablet, Take 1/2 tab. daily, Disp: 30 tablet, Rfl: 5  •  Spiriva Respimat 1.25 MCG/ACT AERS inhaler, INHALE 2 PUFFS BY MOUTH EVERY DAY, Disp: 4 g, Rfl: 5  •  EPINEPHrine (EPIPEN) 0.3 mg/0.3 mL SOAJ, Inject 0.3 mL (0.3 mg total) into a muscle once for 1 dose, Disp: 0.6 mL, Rfl: 0    Social History     Socioeconomic History   • Marital status: /Civil Union     Spouse name: Not on file   • Number of children: Not on file   • Years of education: Not on file   • Highest education level: Not on file   Occupational History   • Not on file   Tobacco Use   • Smoking status: Former     Types: Pipe, Cigars     Start date:      Quit date: 2021     Years since quittin.5   • Smokeless tobacco: Never   • Tobacco comments:     cigar once a day   Vaping Use   • Vaping Use: Never used   Substance and Sexual Activity   • Alcohol use:  Yes     Alcohol/week: 2.0 standard drinks of alcohol     Types: 2 Shots of liquor per week     Comment: social   • Drug use: No   • Sexual activity: Yes     Partners: Female     Birth control/protection: None   Other Topics Concern   • Not on file   Social History Narrative   • Not on file     Social Determinants of Health     Financial Resource Strain: Not on file   Food Insecurity: No Food Insecurity (2023)    Hunger Vital Sign    • Worried About Running Out of Food in the Last Year: Never true    • Ran Out of Food in the Last Year: Never true   Transportation Needs: No Transportation Needs (2023) PRAPARE - Transportation    • Lack of Transportation (Medical): No    • Lack of Transportation (Non-Medical): No   Physical Activity: Not on file   Stress: Not on file   Social Connections: Not on file   Intimate Partner Violence: Not on file   Housing Stability: Low Risk  (7/25/2023)    Housing Stability Vital Sign    • Unable to Pay for Housing in the Last Year: No    • Number of Places Lived in the Last Year: 1    • Unstable Housing in the Last Year: No     Family History   Problem Relation Age of Onset   • Stroke Mother    • Hypertension Father    • Cancer Father    • COPD Father        Vitals:   Blood pressure 110/70, pulse 84, weight (!) 158 kg (348 lb), SpO2 96 %. Body mass index is 45.91 kg/m². Wt Readings from Last 10 Encounters:   08/04/23 (!) 158 kg (348 lb)   07/28/23 (!) 156 kg (344 lb 5.7 oz)   07/18/23 (!) 158 kg (349 lb)   05/01/23 (!) 149 kg (327 lb 6.4 oz)   04/18/23 (!) 147 kg (323 lb 13.7 oz)   04/10/23 (!) 154 kg (339 lb 11.2 oz)   03/21/23 (!) 151 kg (332 lb 0.2 oz)   03/15/23 (!) 151 kg (333 lb)   12/28/22 (!) 148 kg (326 lb)   12/27/22 (!) 147 kg (324 lb)     Vitals:    08/04/23 1449   BP: 110/70   BP Location: Left arm   Patient Position: Sitting   Cuff Size: Standard   Pulse: 84   SpO2: 96%   Weight: (!) 158 kg (348 lb)       Physical Exam  Vitals reviewed. Constitutional:       General: He is awake. He is not in acute distress. Appearance: Normal appearance. He is well-developed and overweight. He is not ill-appearing, toxic-appearing or diaphoretic. HENT:      Head: Normocephalic. Nose: Nose normal.      Mouth/Throat:      Mouth: Mucous membranes are moist.   Eyes:      General: No scleral icterus. Conjunctiva/sclera: Conjunctivae normal.   Neck:      Vascular: JVD: ~9 cm. Trachea: No tracheal deviation. Cardiovascular:      Rate and Rhythm: Normal rate and regular rhythm. Heart sounds: No murmur heard.   Pulmonary:      Effort: Pulmonary effort is normal. No tachypnea, bradypnea or respiratory distress. Breath sounds: Normal air entry. No decreased air movement. No decreased breath sounds or wheezing. Abdominal:      General: There is distension (mild). Palpations: Abdomen is soft. Tenderness: There is no abdominal tenderness. Musculoskeletal:      Cervical back: Neck supple. Right lower le+ Edema (hitting around ankle) present. Left lower le+ Edema (hitting around ankle) present. Skin:     General: Skin is warm and dry. Coloration: Skin is pale. Skin is not jaundiced. Neurological:      General: No focal deficit present. Mental Status: He is alert and oriented to person, place, and time. Psychiatric:         Attention and Perception: Attention normal.         Mood and Affect: Mood and affect normal.         Speech: Speech normal.         Behavior: Behavior normal. Behavior is cooperative. Thought Content:  Thought content normal.       Labs & Results:  Lab Results   Component Value Date    WBC 7.84 2023    HGB 10.3 (L) 2023    HCT 34.6 (L) 2023    MCV 85 2023     2023     Lab Results   Component Value Date    SODIUM 138 2023    K 4.2 2023     2023    CO2 28 2023    BUN 28 (H) 2023    CREATININE 1.73 (H) 2023    GLUC 189 (H) 2023    CALCIUM 8.9 2023     No results found for: "INR", "PROTIME"     Lab Results   Component Value Date    NTBNP 697 (H) 04/10/2023      Lab Results   Component Value Date     (H) 2023      Haylee Tan PA-C

## 2023-08-08 ENCOUNTER — TELEPHONE (OUTPATIENT)
Dept: CARDIOLOGY CLINIC | Facility: CLINIC | Age: 58
End: 2023-08-08

## 2023-08-08 NOTE — TELEPHONE ENCOUNTER
Arturo Waggoner called back and advised, he feel well, no cardiac s/s. Wt has been stable no change. Advised was following up on report.  Verbally understood

## 2023-08-08 NOTE — TELEPHONE ENCOUNTER
Called [employer] and left a message on vm  to follow up on [employer] on cardiac pressure sensor device.  Would like to discuss if having an s/s

## 2023-08-09 ENCOUNTER — OFFICE VISIT (OUTPATIENT)
Dept: FAMILY MEDICINE CLINIC | Facility: CLINIC | Age: 58
End: 2023-08-09
Payer: COMMERCIAL

## 2023-08-09 VITALS
HEIGHT: 73 IN | RESPIRATION RATE: 14 BRPM | HEART RATE: 62 BPM | OXYGEN SATURATION: 97 % | TEMPERATURE: 98 F | DIASTOLIC BLOOD PRESSURE: 74 MMHG | SYSTOLIC BLOOD PRESSURE: 122 MMHG | BODY MASS INDEX: 41.75 KG/M2 | WEIGHT: 315 LBS

## 2023-08-09 DIAGNOSIS — I10 PRIMARY HYPERTENSION: ICD-10-CM

## 2023-08-09 DIAGNOSIS — N18.31 STAGE 3A CHRONIC KIDNEY DISEASE (HCC): ICD-10-CM

## 2023-08-09 DIAGNOSIS — N18.32 TYPE 2 DIABETES MELLITUS WITH STAGE 3B CHRONIC KIDNEY DISEASE, WITHOUT LONG-TERM CURRENT USE OF INSULIN (HCC): Chronic | ICD-10-CM

## 2023-08-09 DIAGNOSIS — I50.33 ACUTE ON CHRONIC DIASTOLIC CONGESTIVE HEART FAILURE (HCC): Primary | ICD-10-CM

## 2023-08-09 DIAGNOSIS — I48.0 PAROXYSMAL ATRIAL FIBRILLATION (HCC): Chronic | ICD-10-CM

## 2023-08-09 DIAGNOSIS — E11.22 TYPE 2 DIABETES MELLITUS WITH STAGE 3B CHRONIC KIDNEY DISEASE, WITHOUT LONG-TERM CURRENT USE OF INSULIN (HCC): Chronic | ICD-10-CM

## 2023-08-09 DIAGNOSIS — J45.50 SEVERE PERSISTENT ASTHMA WITHOUT COMPLICATION: ICD-10-CM

## 2023-08-09 DIAGNOSIS — E78.2 MIXED HYPERLIPIDEMIA: Chronic | ICD-10-CM

## 2023-08-09 PROBLEM — E87.1 HYPONATREMIA: Status: RESOLVED | Noted: 2021-07-31 | Resolved: 2023-08-09

## 2023-08-09 LAB — SL AMB POCT HEMOGLOBIN AIC: 7.1 (ref ?–6.5)

## 2023-08-09 PROCEDURE — 83036 HEMOGLOBIN GLYCOSYLATED A1C: CPT | Performed by: FAMILY MEDICINE

## 2023-08-09 PROCEDURE — 99495 TRANSJ CARE MGMT MOD F2F 14D: CPT | Performed by: FAMILY MEDICINE

## 2023-08-09 NOTE — PROGRESS NOTES
Chief Complaint   Patient presents with   • Transition of Care Management     CHF     Health Maintenance   Topic Date Due   • Pneumococcal Vaccine: Pediatrics (0 to 5 Years) and At-Risk Patients (6 to 59 Years) (1 - PCV) Never done   • HIV Screening  Never done   • Colorectal Cancer Screening  Never done   • COVID-19 Vaccine (3 - Pfizer series) 07/31/2021   • DM Eye Exam  11/04/2022   • BMI: Followup Plan  01/11/2023   • Depression Screening  06/16/2023   • Annual Physical  06/16/2023   • Influenza Vaccine (1) 09/01/2023   • Kidney Health Evaluation: Albumin/Creatinine Ratio  09/19/2023   • Diabetic Foot Exam  12/20/2023   • HEMOGLOBIN A1C  02/09/2024   • Kidney Health Evaluation: GFR  08/04/2024   • BMI: Adult  08/09/2024   • DTaP,Tdap,and Td Vaccines (2 - Td or Tdap) 06/09/2028   • Hepatitis C Screening  Completed   • HIB Vaccine  Aged Out   • IPV Vaccine  Aged Out   • Hepatitis A Vaccine  Aged Out   • Meningococcal ACWY Vaccine  Aged Out   • HPV Vaccine  Aged Out       Assessment & Plan       Patient presents for TCM visit. He was admitted to [facility] 7/23/23 -7/28/23 for acute on chronic diastolic heart failure. 1. Acute on chronic diastolic congestive heart failure [facility])  Assessment & Plan:  Wt Readings from Last 3 Encounters:   08/09/23 (!) 156 kg (343 lb 12.8 oz)   08/04/23 (!) 158 kg (348 lb)   07/28/23 (!) 156 kg (344 lb 5.7 oz)     Patient reports improvement in shortness of breath, bilateral leg edema. Currently taking Bumex 5 mg twice daily. Follow a  low-sodium diet. Check daily weights. Follow-up with cardiology Dr. Lucy Owens. 2. Primary hypertension  Assessment & Plan:  BP is adequately controlled. Continue Metoprolol ER 50 mg daily. 3. Paroxysmal atrial fibrillation [facility])  Assessment & Plan:  Patient is asymptomatic. Heart rate controlled. Continue anticoagulation with Eliquis as per cardiology.         4. Type 2 diabetes mellitus with stage 3b chronic kidney disease, without long-term current use of insulin (HCC)  Assessment & Plan:    Lab Results   Component Value Date    HGBA1C 7.1 (A) 08/09/2023     Hb A1C goal <7.0. Continue Januvia 100 mg daily, Jardiance 10 mg daily. Recommended to follow a low-carb diet, work on weight reduction. Continue to monitor blood sugar at home. Recheck Hemoglobin A1C in 3 months. Check eye exam by ophthalmology annually. Orders:  -     POCT hemoglobin A1c    5. Stage 3a chronic kidney disease [facility])  Assessment & Plan:  Lab Results   Component Value Date    EGFR 43 08/04/2023    EGFR 32 07/28/2023    EGFR 31 07/27/2023    CREATININE 1.73 (H) 08/04/2023    CREATININE 2.19 (H) 07/28/2023    CREATININE 2.25 (H) 07/27/2023     Recommended to avoid NSAIDs, nephrotoxins. Schedule follow-up visit with nephrology Dr. Landon King. 6. Mixed hyperlipidemia  Assessment & Plan:  Continue Atorvastatin 10 mg daily. Check lipid panel. Orders:  -     Lipid panel; Future  -     Lipid Panel with Direct LDL reflex; Future; Expected date: 08/09/2023    7. Severe persistent asthma without complication  Assessment & Plan:  Symptoms are stable. Continue Symbicort inhaler, Spiriva, Fasenra injections every 8 weeks. Follow[facility]up with [facility] [place]'s pulmonology. BMI Counseling: Body mass index is 45.36 kg/m². The BMI is above normal. Nutrition recommendations include decreasing portion sizes, encouraging healthy choices of fruits and vegetables, decreasing fast food intake, consuming healthier snacks, limiting drinks that contain sugar, moderation in carbohydrate intake, increasing intake of lean protein, reducing intake of saturated and trans fat and reducing intake of cholesterol. Exercise recommendations include exercising 3-5 times per week. Rationale for BMI follow-up plan is due to patient being overweight or obese. Schedule follow-up visit, physical exam in 3 months.     Subjective     Transitional Care Management Review:   [place] qureshi a 66 y.o. male here for TCM follow up. During the TCM phone call patient stated:  TCM Call     Date and time call was made  7/28/2023  2:53 PM    Hospital care reviewed  Records reviewed    Patient was hospitialized at  [address] [place] [address]    Date of Admission  07/23/23    Date of discharge  07/28/23    Diagnosis  Acute on chronic diastolic heart failure ([address])    Disposition  Home    Were the patients medications reviewed and updated  No    Current Symptoms  None    Shortness of breath severity  Moderate      TCM Call     Post hospital issues  None    Should patient be enrolled in anticoag monitoring? No    Scheduled for follow up? Yes    Patients specialists  Cardiologist    Cardiologist name  Adela Real MD    Cardiologist contact #  [phone]    Did you obtain your prescribed medications  Yes    Do you need help managing your prescriptions or medications  No    Is transportation to your appointment needed  No    I have advised the patient to call PCP with any new or worsening symptoms  [facility] [address] members    Support System  Family    The type of support provided  None    Do you have social support  Yes, some    Are you recieving any outpatient services  No    Are you recieving home care services  No    Are you using any community resources  No    Current waiver services  No    Have you fallen in the last 12 months  No    Interperter language line needed  No    Counseling  Patient        HPI     Patient presents for TCM visit. He was admitted to [facility] 7/23/23 -7/28/23 for acute on chronic diastolic heart failure. Patient originally presented to the hospital with ongoing dyspnea and weight gain. He was started on Bumex GGT gtt, transition to PO Bumex, was discharged home on Bumex 5 mg twice daily. Reviewed hospital records, test results, discharge medication with patient. Patient was seen by cardiology PA-C on 8/4/23.     Patient reports improvement in shortness of breath, leg edema. Denies chest pain, dizziness. Weight has been stable. A. Fib - on anticoagulation with Eliquis. Heart rate controlled on beta-blocker. HTN - blood pressure at home ranges 128/70's. CKD stage 3 -  creatinine was elevated during hospitalization din the setting of IV diuretics. Patient will schedule follow-up office visit with nephrology Dr. Ck Dowling. Blood work done on August 4, 2023. Creatinine 1.73 - at baseline, GFR 43. Potassium 4.2, glucose 209,    Type 2 DM - currently taking Januvia and Jardiance. Reports no hypoglycemic episodes. He monitors blood sugar daily. Blood sugar ranges in the 130's. Severe persistent asthma - symptoms are stable. Patient follows with [place] pulmonology. Review of Systems   Constitutional: Positive for fatigue (mild). Negative for appetite change, chills and diaphoresis. HENT: Negative for congestion, ear pain, hearing loss, sore throat and trouble swallowing. Eyes: Negative for pain, redness, itching and visual disturbance. Respiratory: Positive for shortness of breath (mild exertional SOB ). Negative for cough, chest tightness and wheezing. Cardiovascular: Positive for leg swelling (improved). Negative for chest pain and palpitations. Gastrointestinal: Negative for abdominal pain, blood in stool, constipation, diarrhea, nausea and vomiting. Genitourinary: Positive for frequency. Negative for difficulty urinating, dysuria and hematuria. Musculoskeletal: Positive for arthralgias (BL knees). Negative for back pain, gait problem and joint swelling. Skin: Negative for rash. Neurological: Positive for numbness (in feet). Negative for dizziness, syncope and headaches. Hematological: Negative for adenopathy. Bruises/bleeds easily. Psychiatric/Behavioral: Negative for dysphoric mood and sleep disturbance. The patient is not nervous/anxious.         Objective     BP 122/74   Pulse 62   Temp 98 °F (36.7 °C) (Tympanic)   Resp 14   Ht 6' 1" (1.854 m)   Wt (!) 156 kg (343 lb 12.8 oz)   SpO2 97%   BMI 45.36 kg/m²      Physical Exam  Vitals reviewed. Constitutional:       Appearance: Normal appearance. Comments: Morbidly obese   HENT:      Head: Normocephalic and atraumatic. Eyes:      Conjunctiva/sclera: Conjunctivae normal.      Pupils: Pupils are equal, round, and reactive to light. Neck:      Vascular: No carotid bruit. Cardiovascular:      Rate and Rhythm: Normal rate and regular rhythm. Heart sounds: No murmur heard. Comments: Trace BL LE edema  Pulmonary:      Effort: Pulmonary effort is normal.      Breath sounds: Normal breath sounds. Abdominal:      General: There is no distension. Palpations: Abdomen is soft. Tenderness: There is no abdominal tenderness. Musculoskeletal:         General: No swelling. Normal range of motion. Cervical back: Normal range of motion and neck supple. Skin:     General: Skin is warm and dry. Findings: No rash. Neurological:      Mental Status: He is alert.    Psychiatric:         Mood and Affect: Mood normal.       Medications have been reviewed by provider in current encounter    Ryne Cee MD

## 2023-08-10 NOTE — ASSESSMENT & PLAN NOTE
Patient is asymptomatic. Heart rate controlled. Continue anticoagulation with Eliquis as per cardiology.

## 2023-08-10 NOTE — ASSESSMENT & PLAN NOTE
Wt Readings from Last 3 Encounters:   08/09/23 (!) 156 kg (343 lb 12.8 oz)   08/04/23 (!) 158 kg (348 lb)   07/28/23 (!) 156 kg (344 lb 5.7 oz)     Patient reports improvement in shortness of breath, bilateral leg edema. Currently taking Bumex 5 mg twice daily. Follow a  low-sodium diet. Check daily weights. Follow-up with cardiology Dr. Chilo Garcia.

## 2023-08-10 NOTE — ASSESSMENT & PLAN NOTE
Lab Results   Component Value Date    EGFR 43 08/04/2023    EGFR 32 07/28/2023    EGFR 31 07/27/2023    CREATININE 1.73 (H) 08/04/2023    CREATININE 2.19 (H) 07/28/2023    CREATININE 2.25 (H) 07/27/2023     Recommended to avoid NSAIDs, nephrotoxins. Schedule follow-up visit with nephrology Dr. Ameena Maddox.

## 2023-08-10 NOTE — ASSESSMENT & PLAN NOTE
Lab Results   Component Value Date    HGBA1C 7.1 (A) 08/09/2023     Hb A1C goal <7.0. Continue Januvia 100 mg daily, Jardiance 10 mg daily. Recommended to follow a low-carb diet, work on weight reduction. Continue to monitor blood sugar at home. Recheck Hemoglobin A1C in 3 months. Check eye exam by ophthalmology annually.

## 2023-08-10 NOTE — ASSESSMENT & PLAN NOTE
Symptoms are stable. Continue Symbicort inhaler, Spiriva, Fasenra injections every 8 weeks. Follow-up with [facility] [place] pulmonology.

## 2023-08-11 ENCOUNTER — TELEPHONE (OUTPATIENT)
Dept: CARDIOLOGY CLINIC | Facility: CLINIC | Age: 58
End: 2023-08-11

## 2023-08-16 ENCOUNTER — TELEPHONE (OUTPATIENT)
Dept: CARDIOLOGY CLINIC | Facility: CLINIC | Age: 58
End: 2023-08-16

## 2023-08-16 NOTE — TELEPHONE ENCOUNTER
Spoke with pt and he is doing good. Denies any CHF symptoms presently. He knows when to call the office. Will call him again next week.

## 2023-08-18 NOTE — PROGRESS NOTES
Advanced Heart Failure / Pulmonary Hypertension Outpatient Progress Note    Steffi Montgomery 66 y.o. male   MRN: [id number]  Encounter: [id number]    Assessment:  Patient Active Problem List    Diagnosis Date Noted   • Severe persistent asthma without complication 31/15/8075   • Loose stools 04/17/2023   • Acute on chronic congestive heart failure ([address]) 04/10/2023   • Diabetic polyneuropathy associated with type 2 diabetes mellitus ([address]) 12/20/2022   • Stage 3a chronic kidney disease ([address]) 09/16/2022   • Paroxysmal atrial fibrillation ([address]) 03/01/2022   • Chronic heart failure with preserved ejection fraction (HFpEF) ([address]) 11/12/2021   • Severe persistent asthma 10/11/2021   • HNP (herniated nucleus pulposus), lumbar 02/23/2021   • Foraminal stenosis of lumbar region 02/23/2021   • VICKY (obstructive sleep apnea)    • Mixed hyperlipidemia 04/18/2019   • Primary osteoarthritis of both knees 06/14/2018   • Irritable bowel syndrome with diarrhea 01/30/2018   • Primary hypertension 01/30/2018   • Type 2 diabetes mellitus, without long-term current use of insulin ([address]) 01/30/2018   • Vitamin B12 deficiency 03/08/2016   • Vitamin D deficiency 03/08/2016   • Morbid obesity due to excess calories ([address]) 02/23/2016   • Primary osteoarthritis of right knee 10/15/2013   • Presence of CardioMEMS HF system 03/09/2022       Today's Plan:  • CardioMEMS PAd reading of 20 yesterday AM (goal of 15). o Essentially euvolemic on exam today with no acute weight gain. • Continue current medications, and continue to trend MEMs readings. Plan:  Chronic HFpEF; LVEF 55%; LVIDd 6.0 cm; NYHA II; ACC/AHA Stage C   Etiology: Afib, HTN, obesity phenotype. TTE 11/21/2021: LVEF 55%. LVIDd 6.0 cm. Grade 1 DD. Normal RV. Trace TR.     LHC 09/06/2022: no obstructive CAD. TTE 04/11/2023: LVEF 50%. LVIDd 6.6 cm. Grade 2 DD. Normal RV. Trace MR and TR. Normal IVC. Weight of 348 lbs on 08/04. Today, weighs 346 lbs.     Most recent BMP from 08/04/2023: sodium 138; potassium 4.2; BUN 28; creatinine 1.73; eGFR 43. Pharmacotherapies / Neurohormonal Blockade:  --Beta Blocker: metoprolol succinate 50 mg daily. --ARNi / ACEi / ARB: No.  --Aldosterone Antagonist: No.  --SGLT2 Inhibitor: empagliflozin 10 mg daily. --Diuretic: Bumex 5 mg BID with potassium 40 mEq BID with PRN metolazone 2.5 mg. Advanced Therapies:   --CardioMEMS: implanted 03/09/2022. Goal PAd of 15. Atrial fibrillation, parosxysmal   [id number] = 3 (HF, HTN, DM). Anticoagulation on Eliquis. Rate control: BB as above. Rhythm control: No.    Hypertension   BP of 110/66 mmHg in office today. Continues on medications as above. Chronic kidney disease, stage IIIb   Baseline creatinine of 1.5-2.0. Most recent BMP from 08/04/2023: sodium 138; potassium 4.2; BUN 28; creatinine 1.73; eGFR 43. Asthma, severe persistent: follows with Dr. Annalise Sharpe as outpatient. Obstructive sleep apnea: without CPAP and equipment for ~12 months. Planning to follow-up with device company s/p discharge in attempt to get CPAP machine. Diabetes mellitus, type II  History of gastric bypass (Samuel-en-Y) surgery   History of tobacco abuse    HPI:   Keny Hatfield is a 28-year-old man with a PMH as above who presents to the office for follow-up visit. Follows with Dr. Pema Abdi. PAd elevated on 07/03; advised to take metolazone 2.5 x1 and increase Bumex to 4 mg BID. Advised again to take metolazone 2.5 mg x1 on 07/14/2023 (PAd of 20). 07/18/2023: Patient presents for an acute visit. Up 22 lbs weight gain since being discharged in 04/2023 with reported 6 lbs gain overnight on home scale. Reports worsening TELLES, bendopnea, LE swelling and fatigue. Feeling winded when showering. Continues with chronic orthopnea; denies PND. Reports losing only 1-2 lbs after taking 2.5 mg metolazone. Denies recent illness or recent dietary indiscretions.      Admitted to [facility] from 07/23 to 07/28/2023 after presenting with worsening SOB/TELLES, weight gain, and decreased urinary response.  (no priors). CXR with "slight increased pulmonary vascular congestion." Started on IV Lasix, and cardiology consulted. Switched to Bumex drip on 07/24. Transitioned to PO Bumex on 07/26. MEMS pillow brought in from home. PAd reading of 13 on 07/27; PO Bumex dose decreased to 5 mg BID. PAd of 16 on 07/28. Lost 6 lbs this admission. 08/04/2023: Patient presents for hospital follow-up. Feeling well. Weights stable on home scale at 342 lbs. Has not required/taken metolazone since being home. Denies TELLES, LE swelling, PND, and orthopnea. 08/23/2023: Patient presents for follow-up. No current complaints. Weights stable on home scale. Has not required PRN metolazone since last visit. Denies SOB, TELLES, abdominal distention, LE swelling, PND, and orthopnea. Past Medical History:   Diagnosis Date   • Acute gastritis without hemorrhage     last assessed 3/24/17   • Asthma    • Atrial fibrillation [facility])    • Bilateral leg edema 02/19/2020   • CHF (congestive heart failure) ([facility])    • Coronary artery disease    • Daytime sleepiness 07/17/2019   • Diabetes mellitus ([address])    • Dyspnea on exertion 10/11/2021   • Edema of both feet 01/23/2020   • Gastric bypass status for obesity    • Hyperlipidemia    • Hypertension    • Hypokalemia 11/14/2021   • Impaired fasting glucose     last assessed 5/10/17   • Knee sprain, bilateral 06/14/2018   • Leg cramps 06/16/2022   • Obesity    • Viral gastroenteritis     last assessed 11/4/16        Review of Systems   Constitutional: Negative for activity change, appetite change, fatigue and unexpected weight change. Eyes: Negative. Respiratory: Negative for cough, chest tightness and shortness of breath. Cardiovascular: Negative for chest pain, palpitations and leg swelling. Gastrointestinal: Negative for abdominal distention, abdominal pain, diarrhea and nausea. Endocrine: Negative. Genitourinary: Negative for decreased urine volume, difficulty urinating, dysuria and urgency. Musculoskeletal: Negative. Skin: Negative. Allergic/Immunologic: Negative. Neurological: Negative for dizziness, syncope, weakness and light-headedness. Psychiatric/Behavioral: Negative for confusion and sleep disturbance. The patient is not nervous/anxious.         Allergies   Allergen Reactions   • Azithromycin Other (See Comments)     Shaky, uneasy feeling    • Bactrim [Sulfamethoxazole-Trimethoprim] Other (See Comments)     shakiness         Current Outpatient Medications:   •  Accu-Chek FastClix Lancets MISC, Test blood sugar twice daily, Disp: 200 each, Rfl: 3  •  albuterol (PROVENTIL HFA,VENTOLIN HFA) 90 mcg/act inhaler, INHALE 2 PUFFS EVERY 4 (FOUR) HOURS AS NEEDED FOR WHEEZING OR SHORTNESS OF BREATH, Disp: 18 g, Rfl: 3  •  apixaban (Eliquis) 5 mg, Take 1 tablet (5 mg total) by mouth 2 (two) times a day, Disp: 60 tablet, Rfl: 5  •  atorvastatin (LIPITOR) 10 mg tablet, TAKE 1 TABLET BY MOUTH EVERY DAY, Disp: 90 tablet, Rfl: 2  •  benzonatate (TESSALON PERLES) 100 mg capsule, Take 1 capsule (100 mg total) by mouth 3 (three) times a day as needed (for cough), Disp: 30 capsule, Rfl: 0  •  Blood Glucose Monitoring Suppl (Accu-Chek Guide) w/Device KIT, Use 2 (two) times a day Test blood sugar twice daily, Disp: 1 kit, Rfl: 0  •  budesonide-formoterol (Symbicort) 160-4.5 mcg/act inhaler, Inhale 2 puffs 2 (two) times a day Rinse mouth after use., Disp: 30.6 g, Rfl: 2  •  bumetanide (BUMEX) 1 mg tablet, Take 5 tablets (5 mg total) by mouth 2 (two) times a day, Disp: 300 tablet, Rfl: 0  •  Empagliflozin (JARDIANCE) 10 MG TABS tablet, Take 1 tablet (10 mg total) by mouth daily, Disp: 30 tablet, Rfl: 11  •  Fasenra Pen 30 MG/ML SOAJ, , Disp: , Rfl:   •  levalbuterol (Xopenex) 1.25 mg/3 mL nebulizer solution, Take 3 mL (1.25 mg total) by nebulization as needed for wheezing or shortness of breath, Disp: , Rfl:   • metolazone (ZAROXOLYN) 2.5 mg tablet, Take 1 tablet by mouth as directed by provider. Take 20-30 minutes before Bumex dose and with additional potassium. , Disp: 90 tablet, Rfl: 1  •  metoprolol succinate (TOPROL-XL) 50 mg 24 hr tablet, Take 1 tablet (50 mg total) by mouth daily, Disp: 90 tablet, Rfl: 3  •  pantoprazole (PROTONIX) 40 mg tablet, Take 1 tablet (40 mg total) by mouth daily, Disp: 21 tablet, Rfl: 0  •  potassium chloride (K-DUR,KLOR-CON) 20 mEq tablet, Take 2 tablets (40 mEq total) by mouth 2 (two) times a day, Disp: 120 tablet, Rfl: 0  •  pregabalin (LYRICA) 50 mg capsule, Take 1 caps. at bedtime, Disp: 30 capsule, Rfl: 5  •  sitaGLIPtin (Januvia) 100 mg tablet, TAKE 1/2 TABLET DAILY, Disp: 15 tablet, Rfl: 5  •  Spiriva Respimat 1.25 MCG/ACT AERS inhaler, INHALE 2 PUFFS BY MOUTH EVERY DAY, Disp: 4 g, Rfl: 5  •  EPINEPHrine (EPIPEN) 0.3 mg/0.3 mL SOAJ, Inject 0.3 mL (0.3 mg total) into a muscle once for 1 dose, Disp: 0.6 mL, Rfl: 0  •  montelukast (SINGULAIR) 10 mg tablet, Take 1 tablet (10 mg total) by mouth daily at bedtime, Disp: 90 tablet, Rfl: 1    Social History     Socioeconomic History   • Marital status: /Civil Union     Spouse name: Not on file   • Number of children: Not on file   • Years of education: Not on file   • Highest education level: Not on file   Occupational History   • Not on file   Tobacco Use   • Smoking status: Former     Types: Pipe, Cigars     Start date:      Quit date: 2021     Years since quittin.6   • Smokeless tobacco: Never   • Tobacco comments:     cigar once a day   Vaping Use   • Vaping Use: Never used   Substance and Sexual Activity   • Alcohol use:  Yes     Alcohol/week: 2.0 standard drinks of alcohol     Types: 2 Shots of liquor per week     Comment: social   • Drug use: No   • Sexual activity: Yes     Partners: Female     Birth control/protection: None   Other Topics Concern   • Not on file   Social History Narrative   • Not on file     Social Determinants of Health     Financial Resource Strain: Not on file   Food Insecurity: No Food Insecurity (7/25/2023)    Hunger Vital Sign    • Worried About Running Out of Food in the Last Year: Never true    • Ran Out of Food in the Last Year: Never true   Transportation Needs: No Transportation Needs (7/25/2023)    PRAPARE - Transportation    • Lack of Transportation (Medical): No    • Lack of Transportation (Non-Medical): No   Physical Activity: Not on file   Stress: Not on file   Social Connections: Not on file   Intimate Partner Violence: Not on file   Housing Stability: Low Risk  (7/25/2023)    Housing Stability Vital Sign    • Unable to Pay for Housing in the Last Year: No    • Number of Places Lived in the Last Year: 1    • Unstable Housing in the Last Year: No     Family History   Problem Relation Age of Onset   • Stroke Mother    • Hypertension Father    • Cancer Father    • COPD Father        Vitals:   Blood pressure 110/66, pulse 76, height 6' 1" (1.854 m), weight (!) 157 kg (346 lb), SpO2 98 %. Body mass index is 45.65 kg/m². Wt Readings from Last 10 Encounters:   08/23/23 (!) 157 kg (346 lb)   08/09/23 (!) 156 kg (343 lb 12.8 oz)   08/04/23 (!) 158 kg (348 lb)   07/28/23 (!) 156 kg (344 lb 5.7 oz)   07/18/23 (!) 158 kg (349 lb)   05/01/23 (!) 149 kg (327 lb 6.4 oz)   04/18/23 (!) 147 kg (323 lb 13.7 oz)   04/10/23 (!) 154 kg (339 lb 11.2 oz)   03/21/23 (!) 151 kg (332 lb 0.2 oz)   03/15/23 (!) 151 kg (333 lb)     Vitals:    08/23/23 0838   BP: 110/66   BP Location: Left arm   Patient Position: Sitting   Cuff Size: Large   Pulse: 76   SpO2: 98%   Weight: (!) 157 kg (346 lb)   Height: 6' 1" (1.854 m)       Physical Exam  Vitals reviewed. Constitutional:       General: He is awake. He is not in acute distress. Appearance: Normal appearance. He is well-developed and overweight. He is not ill-appearing, toxic-appearing or diaphoretic. HENT:      Head: Normocephalic.       Nose: Nose normal. Mouth/Throat:      Mouth: Mucous membranes are moist.   Eyes:      General: No scleral icterus. Conjunctiva/sclera: Conjunctivae normal.   Neck:      Vascular: No JVD. Trachea: No tracheal deviation. Cardiovascular:      Rate and Rhythm: Normal rate and regular rhythm. No extrasystoles are present. Heart sounds: No murmur heard. Pulmonary:      Effort: Pulmonary effort is normal. No tachypnea, bradypnea or respiratory distress. Breath sounds: Normal air entry. No decreased air movement. No decreased breath sounds, wheezing or rales. Abdominal:      General: Bowel sounds are normal. There is no distension. Palpations: Abdomen is soft. Tenderness: There is no abdominal tenderness. Musculoskeletal:      Cervical back: Neck supple. Right lower leg: No edema. Left lower leg: No edema. Skin:     General: Skin is warm and dry. Coloration: Skin is pale. Skin is not jaundiced. Neurological:      General: No focal deficit present. Mental Status: He is alert and oriented to person, place, and time. Psychiatric:         Attention and Perception: Attention normal.         Mood and Affect: Mood and affect normal.         Speech: Speech normal.         Behavior: Behavior normal. Behavior is cooperative. Thought Content:  Thought content normal.       Labs & Results:  Lab Results   Component Value Date    WBC 7.84 07/28/2023    HGB 10.3 (L) 07/28/2023    HCT 34.6 (L) 07/28/2023    MCV 85 07/28/2023     07/28/2023     Lab Results   Component Value Date    SODIUM 138 08/04/2023    K 4.2 08/04/2023     08/04/2023    CO2 28 08/04/2023    BUN 28 (H) 08/04/2023    CREATININE 1.73 (H) 08/04/2023    GLUC 189 (H) 07/28/2023    CALCIUM 8.9 08/04/2023     No results found for: "INR", "PROTIME"     Lab Results   Component Value Date    NTBNP 697 (H) 04/10/2023      Lab Results   Component Value Date     (H) 08/04/2023      Bebeto Trinidad PA-C

## 2023-08-21 DIAGNOSIS — E11.22 TYPE 2 DIABETES MELLITUS WITH STAGE 3B CHRONIC KIDNEY DISEASE, WITHOUT LONG-TERM CURRENT USE OF INSULIN (HCC): ICD-10-CM

## 2023-08-21 DIAGNOSIS — N18.32 TYPE 2 DIABETES MELLITUS WITH STAGE 3B CHRONIC KIDNEY DISEASE, WITHOUT LONG-TERM CURRENT USE OF INSULIN (HCC): ICD-10-CM

## 2023-08-22 ENCOUNTER — TELEPHONE (OUTPATIENT)
Dept: CARDIOLOGY CLINIC | Facility: CLINIC | Age: 58
End: 2023-08-22

## 2023-08-22 NOTE — TELEPHONE ENCOUNTER
KARINE Siu, RN  Keep me posted on Keon's CardioMEMs readings for this week. Seeing him in the office on 08/23. Thanks!

## 2023-08-23 ENCOUNTER — OFFICE VISIT (OUTPATIENT)
Dept: CARDIOLOGY CLINIC | Facility: CLINIC | Age: 58
End: 2023-08-23
Payer: COMMERCIAL

## 2023-08-23 VITALS
WEIGHT: 315 LBS | BODY MASS INDEX: 41.75 KG/M2 | DIASTOLIC BLOOD PRESSURE: 66 MMHG | OXYGEN SATURATION: 98 % | HEART RATE: 76 BPM | SYSTOLIC BLOOD PRESSURE: 110 MMHG | HEIGHT: 73 IN

## 2023-08-23 DIAGNOSIS — N18.32 STAGE 3B CHRONIC KIDNEY DISEASE (HCC): ICD-10-CM

## 2023-08-23 DIAGNOSIS — I48.0 PAROXYSMAL ATRIAL FIBRILLATION (HCC): ICD-10-CM

## 2023-08-23 DIAGNOSIS — I10 HYPERTENSION, UNSPECIFIED TYPE: ICD-10-CM

## 2023-08-23 DIAGNOSIS — I50.32 CHRONIC HEART FAILURE WITH PRESERVED EJECTION FRACTION (HCC): Primary | ICD-10-CM

## 2023-08-23 PROCEDURE — 99214 OFFICE O/P EST MOD 30 MIN: CPT | Performed by: PHYSICIAN ASSISTANT

## 2023-08-23 NOTE — PATIENT INSTRUCTIONS
Please weigh yourself every day (after emptying your bladder) and keep a detailed log of weights. Contact the Heart Failure program at [phone] if you gain 3+ lbs overnight or 5+ lbs in 5-7 days. Limit daily sodium/salt intake to 2000 mg daily to prevent fluid retention. Avoid canned foods, fast food/Chinese food, and processed meats (hot dogs, lunch meat, and sausage etc.). Caution with condiments. Limit fluid intake to 2000 mL or 2 liters (about 60-65 ounces) daily. Avoid electrolyte replacement drinks (such as Gatorade, Pedialyte, Propel, Liquid IV, etc.). Bring complete list of medications and log of daily weights to your follow-up appointment.

## 2023-08-25 ENCOUNTER — TELEPHONE (OUTPATIENT)
Dept: CARDIOLOGY CLINIC | Facility: CLINIC | Age: 58
End: 2023-08-25

## 2023-08-25 NOTE — TELEPHONE ENCOUNTER
Spoke with pt. Wt is 321 lbs and stable. He feels great. We are watching the cardio mem's . Today he was 17 with goal of 15. I will call him next Thursday for update.

## 2023-09-01 ENCOUNTER — CLINICAL SUPPORT (OUTPATIENT)
Dept: CARDIOLOGY CLINIC | Facility: CLINIC | Age: 58
End: 2023-09-01
Payer: COMMERCIAL

## 2023-09-01 DIAGNOSIS — I50.32 CHRONIC HEART FAILURE WITH PRESERVED EJECTION FRACTION (HFPEF) (HCC): Primary | Chronic | ICD-10-CM

## 2023-09-01 PROCEDURE — 93264 REM MNTR WRLS P-ART PRS SNR: CPT | Performed by: INTERNAL MEDICINE

## 2023-09-13 ENCOUNTER — TELEPHONE (OUTPATIENT)
Dept: CARDIOLOGY CLINIC | Facility: CLINIC | Age: 58
End: 2023-09-13

## 2023-09-13 NOTE — TELEPHONE ENCOUNTER
Spoke with pt due to cardiomem's #'s goal is 15 and he was 9/7/23-19, 9/8/23- 28, 9/10/23-24, 9/11-18, 9/13-14. He states he feels good, no wt gain or CHF symptoms. Do you want him to take a metolazone 2.5 mg / extra dose of potassium before next Bumex ? Next f/u 10/5/23.    8/4/23  Cr-1.73(2.19 on 7/28), K-4.2(4.3), Bun- 28(42), GFr-43    Taking : Bumex 5 mg bid                 Potassium 40 meq bid                 Metolazone as instructed.     Please advise

## 2023-09-15 ENCOUNTER — TELEPHONE (OUTPATIENT)
Dept: CARDIOLOGY CLINIC | Facility: CLINIC | Age: 58
End: 2023-09-15

## 2023-09-15 DIAGNOSIS — J45.20 MILD INTERMITTENT ASTHMA WITHOUT COMPLICATION: ICD-10-CM

## 2023-09-15 RX ORDER — ALBUTEROL SULFATE 90 UG/1
2 AEROSOL, METERED RESPIRATORY (INHALATION) EVERY 4 HOURS PRN
Qty: 18 G | Refills: 0 | Status: SHIPPED | OUTPATIENT
Start: 2023-09-15

## 2023-09-15 NOTE — TELEPHONE ENCOUNTER
Spoke with pt . Wt is 342 and asymptomatic. PAD goal 15.    9/13- 14 9/14- 22  9/15- 21    Had elevation 9/7-9/10. Taking:bumex 5 mg bid              Metolazone 2.5 mg prn. Potassium 40 meq bid    Do you want him to do any changes? Was just checking because it is Friday.       Please advise

## 2023-09-18 ENCOUNTER — TELEPHONE (OUTPATIENT)
Dept: VASCULAR SURGERY | Facility: CLINIC | Age: 58
End: 2023-09-18

## 2023-09-28 ENCOUNTER — TELEPHONE (OUTPATIENT)
Dept: PULMONOLOGY | Facility: CLINIC | Age: 58
End: 2023-09-28

## 2023-09-28 NOTE — TELEPHONE ENCOUNTER
LM for patient to give a call back to set up a follow up Appt in [facility] with any doctor due to Dr Khalil Fears is gone

## 2023-10-02 ENCOUNTER — CLINICAL SUPPORT (OUTPATIENT)
Dept: CARDIOLOGY CLINIC | Facility: CLINIC | Age: 58
End: 2023-10-02
Payer: COMMERCIAL

## 2023-10-02 DIAGNOSIS — I50.32 CHRONIC HEART FAILURE WITH PRESERVED EJECTION FRACTION (HFPEF) (HCC): Primary | Chronic | ICD-10-CM

## 2023-10-02 PROCEDURE — 93264 REM MNTR WRLS P-ART PRS SNR: CPT | Performed by: INTERNAL MEDICINE

## 2023-10-05 ENCOUNTER — OFFICE VISIT (OUTPATIENT)
Dept: CARDIOLOGY CLINIC | Facility: CLINIC | Age: 58
End: 2023-10-05
Payer: COMMERCIAL

## 2023-10-05 VITALS
WEIGHT: 315 LBS | SYSTOLIC BLOOD PRESSURE: 112 MMHG | BODY MASS INDEX: 41.75 KG/M2 | HEART RATE: 95 BPM | DIASTOLIC BLOOD PRESSURE: 70 MMHG | HEIGHT: 73 IN | OXYGEN SATURATION: 94 %

## 2023-10-05 DIAGNOSIS — G47.33 OBSTRUCTIVE SLEEP APNEA: ICD-10-CM

## 2023-10-05 DIAGNOSIS — N18.32 STAGE 3B CHRONIC KIDNEY DISEASE (HCC): ICD-10-CM

## 2023-10-05 DIAGNOSIS — I48.0 PAROXYSMAL ATRIAL FIBRILLATION (HCC): ICD-10-CM

## 2023-10-05 DIAGNOSIS — I50.32 CHRONIC HEART FAILURE WITH PRESERVED EJECTION FRACTION (HFPEF) (HCC): Primary | ICD-10-CM

## 2023-10-05 PROCEDURE — 99214 OFFICE O/P EST MOD 30 MIN: CPT | Performed by: INTERNAL MEDICINE

## 2023-10-05 RX ORDER — SPIRONOLACTONE 25 MG/1
25 TABLET ORAL DAILY
Qty: 30 TABLET | Refills: 5 | Status: ON HOLD | OUTPATIENT
Start: 2023-10-05

## 2023-10-05 NOTE — PATIENT INSTRUCTIONS
Take metolazone 5mg today with additional potassium 40 meq today  Start spironolactone 25mg once a day  Obtain BMP in 1 week  2g sodium diet  2L fluid restriction  Daily weights

## 2023-10-05 NOTE — PROGRESS NOTES
Advanced Heart Failure Outpatient Progress Note - Shari Malhotra 66 y.o. male MRN: [id number]    Encounter: [id number]      Assessment/Plan:    Patient Active Problem List    Diagnosis Date Noted   • Severe persistent asthma without complication 95/53/4334   • Loose stools 04/17/2023   • Acute on chronic congestive heart failure ([address]) 04/10/2023   • Diabetic polyneuropathy associated with type 2 diabetes mellitus ([address]) 12/20/2022   • Stage 3a chronic kidney disease ([address]) 09/16/2022   • Presence of CardioMEMS HF system 03/09/2022   • Paroxysmal atrial fibrillation ([address]) 03/01/2022   • Chronic heart failure with preserved ejection fraction (HFpEF) ([address]) 11/12/2021   • Severe persistent asthma 10/11/2021   • HNP (herniated nucleus pulposus), lumbar 02/23/2021   • Foraminal stenosis of lumbar region 02/23/2021   • VICKY (obstructive sleep apnea)    • Mixed hyperlipidemia 04/18/2019   • Primary osteoarthritis of both knees 06/14/2018   • Irritable bowel syndrome with diarrhea 01/30/2018   • Primary hypertension 01/30/2018   • Type 2 diabetes mellitus, without long-term current use of insulin ([address]) 01/30/2018   • Vitamin B12 deficiency 03/08/2016   • Vitamin D deficiency 03/08/2016   • Morbid obesity due to excess calories ([address]) 02/23/2016   • Primary osteoarthritis of right knee 10/15/2013     # Chronic HFpEF, Stage C, NYHA III   Volume up on exam     Weight:  349 lbs 10/5/23  NT proBNP: 609 4/8/22      Studies- personally reviewed by [facility] 9/6/22: No obstructive CAD    Pharm Nuc Stress 9/2/22: Abnormal study due to the presence of an inferior and inferolateral infarct without significant ischemia. [place] 3/9/22:   RA 4   RV 35/4   PA 35/12/21   PCWP 10   PA sat 64%   Rtavis CO 5.56 L/min   Travis CI 2.2L/min/m2   PVR 1.97 SAGASTUME     TTE 03/25/2020: LVEF 40-45%. LVIDd 6.12 cm. Normal RV.    TTE 07/19/2021: LVEF 50%. LVIDd 6.13 cm. Grade 1 DD. Normal RV. Trace MR and TR. Mildly dilated IVC. TTE 11/12/2021: LVEF 55%.  LVIDd 6. 0 cm. Grade 1 DD. Normal RV. Trace TR. Neurohormonal Blockade:   --Beta Blocker: metoprolol succinate 50 mg daily.    --ARNi / ACEi / ARB: No.    --Aldosterone Antagonist: No.    --SGLT2 Inhibitor: Jardiance 10mg daily  --Diuretic: bumex 5mg BID with potassium 40 meq BID with prn metolazone 2.5mg     Sudden Cardiac Death Risk Reduction:   --LVEF >35%.        Electrical Resynchronization:   --Candidacy for BiV device: narrow QRS.      Advanced Therapies:   Will continue to monitor. LVEF recovered      # Atrial fibrillation   [id number] = 3 (HF, HTN, DM). Diagnosed 02/2022. Anticoagulation on Eliquis. Rate control: BB as above. Rhythm control: No.      # Hypertension, controlled   Rx: amlodipine 5mg daily      # Hyperlipidemia   11/22/22:   HDL 55 LDL 85  Rx: atorvastatin 10 mg daily.        # Diabetes mellitus, type II     Non-insulin dependent. HbA1c 6/16/22: 5.7%      # Obstructive sleep apnea  # Chronic respiratory failure   # Asthma, moderate persistent    # S/p gastric bypass (Samuel-en-Y)surgery    # History of tobacco abuse   # Carpal tunnel syndrome, bilateral    # CKD, Cr 1.43; 1.59 11/22/22; 1.78 8/4/23  # s/p CardioMems implant 3/9/22     TODAY'S PLAN:  Take metolazone 5mg today with additional potassium 40 meq today  Start spironolactone 25mg once a day  Obtain BMP in 1 week  2g sodium diet  2L fluid restriction  Daily weights  Follow up on CPAP machine        HPI:   Davion Corado is a 66 y.o. man with a PMH as above who presents to the office for follow-up.     Admitted to [facility] from 12/23 to 01/01/2022 after presenting with worsening SOB. Started on IV Lasix (later switched to IV Bumex) and IV steroids. Did receive 2 doses of metolazone while inpatient. Transitioned to PO torsemide on day of discharge. Lost 12 lbs and net negative 15 L this admission.      Presented to [facility] ED on 02/12/2022 with worsening SOB.  Diagnosed with Afib and was started on Eliquis and BB. Also received 80 mg IV Lasix, and was discharged home.      02/22/2022: Patient presents for hospital follow-up. Has been feeling "good" until yesterday. Developed TELLES and noted orthopnea overnight. Also with recently worsening of cough resulting in increased use of PRN inhalers/nebulizers. Denies recent dietary indiscretion. Drinking no more than 2000 mL daily. Is completing daily weights; reports down-trending weights over past few days. Has noted slight decrease in response to PO torsemide.      Admitted to [facility] from 03/01 to 03/10/2022 after presenting with worsening SOB and LE swelling. Started on IV Lasix and IV steroids. Later was switched to IV Bumex on 03/04. Transitioned to PO Bumex on 03/07. CardioMEMS implanted on 03/09. Lost 7 lbs and net negative 8.1 L this admission.      03/14/2022: Patient presents for hospital follow-up. No current complaints. Weights stable at home in low 310s lbs range. No issues with CardioMEMS system at home. Still waiting for new CPAP machine.      Presented to [facility] ED on 04/08 at the direction of his pulmonologist due to worsening SOB and TELLES, productive cough, and wheezing. In ED received albuterol nebulizer treatment. CXR without acute cardiopulmonary disease. Heart failure team recommended increasing Bumex to 2 mg qAM and 1 mg qPM with close outpatient follow-up.      Contacted by HF RN on 04/08 to review diuretic dosing changes. Patient reported no improvement since ED visit and presented to [facility] ED for second opinion/further evaluation. Admitted to [facility] from 04/08 to 04/11/2022. Started on IV steroids and IV Lasix. Diagnosed with acute asthma and HF exacerbations. Lost 2 lbs this admission. Discharged on PO steroid taper.     04/15/2022: Patient presents for hospital follow-up appointment. Reviewed recent hospital course(s). No current complaints.  Has continued taking Bumex 2 mg qAm and 1 mg qPM. Is completing daily weights and denies any significant weight gain since returning home. Still waiting on having new CPAP machine delivered. Planning to return to work next week    6/24/22: Here for follow up. Overall doing well. Recent treatments for asthma exacerbation. Last steroid course 3 weeks ago. He is overall doing well. No shortness of breath, PND or orthopnea. Mild lower extremity edema. Home scale not working. Recent CardioMems PADs trending up.    7/28/22: Here for urgent visit due to worsening shortness of breath, weight gain and lower extremity edema. Noted symptoms since 7/25. Was getting extra doses of bumex with up to 2mg TID yesterday with no significant response. Correlates with PADs above goal. Reports adherence to diet and meds. 9/21/22: Here for follow up. Admitted for chest pain and shortness of breath 9/1/22. Euvolemic on exam. Troponins negative. Pharmacological nuclear stress test showed inferior inferolateral infarct without significant ischemia. Cardiac catheterization done showed no obstructive CAD. Also treated for asthma exacerbation and given course of steroids with improvement in symptoms. He is overall feeling well today, breathing better and doing rehab.    12/28/22: Here for follow up. Overall doing well. Last dose of additional bumex last month. Recent uptrend in weight related to diet. No shortness of breath, orthopnea or chest pain. No recent asthma exacerbations. Stable lower extremity edema. PAD today 16, goal 15.    3/15/23: Here for follow up. Overall doing well until this week when he was noting more shortness of breath, wheezing worse with laying down, and leg swelling. Increased dose of bumex to 4mg BID with no significant response. Reports home scale unreliable. Per MJ:  PAd elevated on 07/03; advised to take metolazone 2.5 x1 and increase Bumex to 4 mg BID. Advised again to take metolazone 2.5 mg x1 on 07/14/2023 (PAd of 20). 07/18/2023: Patient presents for an acute visit.  Up 22 lbs weight gain since being discharged in 04/2023 with reported 6 lbs gain overnight on home scale. Reports worsening TELLES, bendopnea, LE swelling and fatigue. Feeling winded when showering. Continues with chronic orthopnea; denies PND. Reports losing only 1-2 lbs after taking 2.5 mg metolazone. Denies recent illness or recent dietary indiscretions.      Admitted to [facility] from 07/23 to 07/28/2023 after presenting with worsening SOB/TELLES, weight gain, and decreased urinary response.  (no priors). CXR with "slight increased pulmonary vascular congestion." Started on IV Lasix, and cardiology consulted. Switched to Bumex drip on 07/24. Transitioned to PO Bumex on 07/26. MEMS pillow brought in from home. PAd reading of 13 on 07/27; PO Bumex dose decreased to 5 mg BID. PAd of 16 on 07/28. Lost 6 lbs this admission.      08/04/2023: Patient presents for hospital follow-up. Feeling well. Weights stable on home scale at 342 lbs. Has not required/taken metolazone since being home. Denies TELLES, LE swelling, PND, and orthopnea.       08/23/2023: Patient presents for follow-up. No current complaints. Weights stable on home scale. Has not required PRN metolazone since last visit. Denies SOB, TELLES, abdominal distention, LE swelling, PND, and orthopnea.      10/5/23: Here for follow up. Reports increasing shortness of breath, leg swelling and weight up 6 lbs over the past 3 days. Took metolazone 2.5mg daily with no good response. No dizziness or lightheadedness         Review of Systems   Constitutional: Negative for chills and fever. HENT: Negative for ear pain and sore throat. Eyes: Negative for pain and visual disturbance. Respiratory: Positive for shortness of breath. Negative for cough and wheezing. Cardiovascular: Positive for leg swelling. Negative for chest pain and palpitations. Gastrointestinal: Negative for abdominal distention, abdominal pain and vomiting.    Genitourinary: Negative for dysuria and hematuria. Musculoskeletal: Negative for arthralgias and back pain. Skin: Negative for color change and rash. Neurological: Negative for dizziness, seizures, syncope and light-headedness. All other systems reviewed and are negative. Past Medical History:   Diagnosis Date   • Acute gastritis without hemorrhage     last assessed 3/24/17   • Asthma    • Atrial fibrillation [facility])    • Bilateral leg edema 02/19/2020   • CHF (congestive heart failure) ([facility])    • Coronary artery disease    • Daytime sleepiness 07/17/2019   • Diabetes mellitus ([address])    • Dyspnea on exertion 10/11/2021   • Edema of both feet 01/23/2020   • Gastric bypass status for obesity    • Hyperlipidemia    • Hypertension    • Hypokalemia 11/14/2021   • Impaired fasting glucose     last assessed 5/10/17   • Knee sprain, bilateral 06/14/2018   • Leg cramps 06/16/2022   • Obesity    • Viral gastroenteritis     last assessed 11/4/16         Allergies   Allergen Reactions   • Azithromycin Other (See Comments)     Shaky, uneasy feeling    • Bactrim [Sulfamethoxazole-Trimethoprim] Other (See Comments)     shakiness     .     Current Outpatient Medications:   •  Accu-Chek FastClix Lancets MISC, Test blood sugar twice daily, Disp: 200 each, Rfl: 3  •  albuterol (PROVENTIL HFA,VENTOLIN HFA) 90 mcg/act inhaler, Inhale 2 puffs every 4 (four) hours as needed for wheezing or shortness of breath, Disp: 18 g, Rfl: 0  •  apixaban (Eliquis) 5 mg, Take 1 tablet (5 mg total) by mouth 2 (two) times a day, Disp: 60 tablet, Rfl: 5  •  atorvastatin (LIPITOR) 10 mg tablet, TAKE 1 TABLET BY MOUTH EVERY DAY, Disp: 90 tablet, Rfl: 2  •  Blood Glucose Monitoring Suppl (Accu-Chek Guide) w/Device KIT, Use 2 (two) times a day Test blood sugar twice daily, Disp: 1 kit, Rfl: 0  •  budesonide-formoterol (Symbicort) 160-4.5 mcg/act inhaler, Inhale 2 puffs 2 (two) times a day Rinse mouth after use., Disp: 30.6 g, Rfl: 2  •  bumetanide (BUMEX) 1 mg tablet, Take 5 tablets (5 mg total) by mouth 2 (two) times a day, Disp: 300 tablet, Rfl: 0  •  Empagliflozin (JARDIANCE) 10 MG TABS tablet, Take 1 tablet (10 mg total) by mouth daily, Disp: 30 tablet, Rfl: 11  •  Fasenra Pen 30 MG/ML SOAJ, , Disp: , Rfl:   •  levalbuterol (Xopenex) 1.25 mg/3 mL nebulizer solution, Take 3 mL (1.25 mg total) by nebulization as needed for wheezing or shortness of breath, Disp: , Rfl:   •  metolazone (ZAROXOLYN) 2.5 mg tablet, Take 1 tablet by mouth as directed by provider. Take 20-30 minutes before Bumex dose and with additional potassium. , Disp: 90 tablet, Rfl: 1  •  metoprolol succinate (TOPROL-XL) 50 mg 24 hr tablet, Take 1 tablet (50 mg total) by mouth daily, Disp: 90 tablet, Rfl: 3  •  montelukast (SINGULAIR) 10 mg tablet, Take 1 tablet (10 mg total) by mouth daily at bedtime, Disp: 90 tablet, Rfl: 1  •  pantoprazole (PROTONIX) 40 mg tablet, Take 1 tablet (40 mg total) by mouth daily, Disp: 21 tablet, Rfl: 0  •  potassium chloride (K-DUR,KLOR-CON) 20 mEq tablet, Take 2 tablets (40 mEq total) by mouth 2 (two) times a day, Disp: 120 tablet, Rfl: 0  •  pregabalin (LYRICA) 50 mg capsule, Take 1 caps.  at bedtime, Disp: 30 capsule, Rfl: 5  •  sitaGLIPtin (Januvia) 100 mg tablet, TAKE 1/2 TABLET DAILY, Disp: 15 tablet, Rfl: 5  •  Spiriva Respimat 1.25 MCG/ACT AERS inhaler, INHALE 2 PUFFS BY MOUTH EVERY DAY, Disp: 4 g, Rfl: 5  •  spironolactone (ALDACTONE) 25 mg tablet, Take 1 tablet (25 mg total) by mouth daily, Disp: 30 tablet, Rfl: 5  •  benzonatate (TESSALON PERLES) 100 mg capsule, Take 1 capsule (100 mg total) by mouth 3 (three) times a day as needed (for cough) (Patient not taking: Reported on 10/5/2023), Disp: 30 capsule, Rfl: 0  •  EPINEPHrine (EPIPEN) 0.3 mg/0.3 mL SOAJ, Inject 0.3 mL (0.3 mg total) into a muscle once for 1 dose, Disp: 0.6 mL, Rfl: 0    Social History     Socioeconomic History   • Marital status: /Civil Union     Spouse name: Not on file   • Number of children: Not on file   • Years of education: Not on file   • Highest education level: Not on file   Occupational History   • Not on file   Tobacco Use   • Smoking status: Former     Types: Pipe, Cigars     Start date:      Quit date: 2021     Years since quittin.7   • Smokeless tobacco: Never   • Tobacco comments:     cigar once a day   Vaping Use   • Vaping Use: Never used   Substance and Sexual Activity   • Alcohol use: Yes     Alcohol/week: 2.0 standard drinks of alcohol     Types: 2 Shots of liquor per week     Comment: social   • Drug use: No   • Sexual activity: Yes     Partners: Female     Birth control/protection: None   Other Topics Concern   • Not on file   Social History Narrative   • Not on file     Social Determinants of Health     Financial Resource Strain: Not on file   Food Insecurity: No Food Insecurity (2023)    Hunger Vital Sign    • Worried About Running Out of Food in the Last Year: Never true    • Ran Out of Food in the Last Year: Never true   Transportation Needs: No Transportation Needs (2023)    PRAPARE - Transportation    • Lack of Transportation (Medical): No    • Lack of Transportation (Non-Medical): No   Physical Activity: Not on file   Stress: Not on file   Social Connections: Not on file   Intimate Partner Violence: Not on file   Housing Stability: Low Risk  (2023)    Housing Stability Vital Sign    • Unable to Pay for Housing in the Last Year: No    • Number of Places Lived in the Last Year: 1    • Unstable Housing in the Last Year: No       Family History   Problem Relation Age of Onset   • Stroke Mother    • Hypertension Father    • Cancer Father    • COPD Father        Physical Exam:    Vitals:   Vitals:    10/05/23 1343   BP: 112/70   Pulse: 95   SpO2: 94%       Physical Exam  Constitutional:       General: He is not in acute distress. Appearance: Normal appearance. HENT:      Head: Normocephalic and atraumatic.       Mouth/Throat:      Mouth: Mucous membranes are moist.   Eyes: General: No scleral icterus. Extraocular Movements: Extraocular movements intact. Cardiovascular:      Rate and Rhythm: Normal rate and regular rhythm. Pulses: Normal pulses. Heart sounds: S1 normal and S2 normal. No murmur heard. No friction rub. No gallop. Comments: Elevated JVP, 2+ pitting edema bilaterally  Pulmonary:      Effort: Pulmonary effort is normal.      Breath sounds: No wheezing, rhonchi or rales. Abdominal:      General: There is no distension. Palpations: Abdomen is soft. Tenderness: There is no abdominal tenderness. There is no guarding or rebound. Musculoskeletal:         General: Normal range of motion. Cervical back: Neck supple. Skin:     General: Skin is warm and dry. Capillary Refill: Capillary refill takes less than 2 seconds. Neurological:      General: No focal deficit present. Mental Status: He is alert and oriented to person, place, and time.    Psychiatric:         Mood and Affect: Mood normal.         Labs & Results:    Lab Results   Component Value Date    SODIUM 138 08/04/2023    K 4.2 08/04/2023     08/04/2023    CO2 28 08/04/2023    BUN 28 (H) 08/04/2023    CREATININE 1.73 (H) 08/04/2023    GLUC 189 (H) 07/28/2023    CALCIUM 8.9 08/04/2023     Lab Results   Component Value Date    WBC 7.84 07/28/2023    HGB 10.3 (L) 07/28/2023    HCT 34.6 (L) 07/28/2023    MCV 85 07/28/2023     07/28/2023     Lab Results   Component Value Date    NTBNP 697 (H) 04/10/2023      Lab Results   Component Value Date    CHOLESTEROL 165 11/22/2022    CHOLESTEROL 235 (H) 09/19/2022    CHOLESTEROL 163 09/05/2022     Lab Results   Component Value Date    HDL 55 11/22/2022    HDL 64 09/19/2022    HDL 45 09/05/2022     Lab Results   Component Value Date    TRIG 127 11/22/2022    TRIG 198 (H) 09/19/2022    TRIG 138 09/05/2022     Lab Results   Component Value Date    NONHDLC 110 11/22/2022    [address] 171 09/19/2022    [address] 118 09/05/2022       EKG personally reviewed by Christina Bush. Counseling / Coordination of Care  Time spent today 25 minutes. Greater than 50% of total time was spent with the patient and / or family counseling and / or coordination of care. We went over current diagnosis, most recent studies and any changes in treatment. Thank you for the opportunity to participate in the care of this patient.     Christina Bush MD  ADVANCED HEART FAILURE AND MECHANICAL CIRCULATORY SUPPORT  [address]

## 2023-10-06 ENCOUNTER — APPOINTMENT (OUTPATIENT)
Dept: LAB | Facility: CLINIC | Age: 58
DRG: 286 | End: 2023-10-06
Payer: COMMERCIAL

## 2023-10-06 ENCOUNTER — APPOINTMENT (EMERGENCY)
Dept: RADIOLOGY | Facility: HOSPITAL | Age: 58
DRG: 286 | End: 2023-10-06
Payer: COMMERCIAL

## 2023-10-06 ENCOUNTER — HOSPITAL ENCOUNTER (INPATIENT)
Facility: HOSPITAL | Age: 58
LOS: 6 days | Discharge: HOME | DRG: 286 | End: 2023-10-12
Attending: EMERGENCY MEDICINE | Admitting: INTERNAL MEDICINE
Payer: COMMERCIAL

## 2023-10-06 ENCOUNTER — TELEPHONE (OUTPATIENT)
Dept: CARDIOLOGY CLINIC | Facility: CLINIC | Age: 58
End: 2023-10-06

## 2023-10-06 DIAGNOSIS — I50.32 CHRONIC HEART FAILURE WITH PRESERVED EJECTION FRACTION (HFPEF) (HCC): ICD-10-CM

## 2023-10-06 DIAGNOSIS — E78.2 MIXED HYPERLIPIDEMIA: Chronic | ICD-10-CM

## 2023-10-06 DIAGNOSIS — I50.9 CHF (CONGESTIVE HEART FAILURE) (HCC): ICD-10-CM

## 2023-10-06 DIAGNOSIS — I50.33 ACUTE ON CHRONIC DIASTOLIC CONGESTIVE HEART FAILURE (HCC): ICD-10-CM

## 2023-10-06 DIAGNOSIS — J45.50 SEVERE PERSISTENT ASTHMA WITHOUT COMPLICATION: ICD-10-CM

## 2023-10-06 DIAGNOSIS — I50.33 ACUTE ON CHRONIC DIASTOLIC HEART FAILURE (HCC): ICD-10-CM

## 2023-10-06 DIAGNOSIS — J45.20 MILD INTERMITTENT ASTHMA WITHOUT COMPLICATION: ICD-10-CM

## 2023-10-06 DIAGNOSIS — R06.09 DYSPNEA ON EXERTION: Primary | ICD-10-CM

## 2023-10-06 DIAGNOSIS — I50.32 CHRONIC HEART FAILURE WITH PRESERVED EJECTION FRACTION (HFPEF) (HCC): Primary | Chronic | ICD-10-CM

## 2023-10-06 DIAGNOSIS — R09.81 NASAL CONGESTION: ICD-10-CM

## 2023-10-06 DIAGNOSIS — I50.33 ACUTE ON CHRONIC HEART FAILURE WITH PRESERVED EJECTION FRACTION (HCC): ICD-10-CM

## 2023-10-06 DIAGNOSIS — E11.9 DIABETES (HCC): ICD-10-CM

## 2023-10-06 DIAGNOSIS — I10 HYPERTENSION: ICD-10-CM

## 2023-10-06 LAB
2HR DELTA HS TROPONIN: -2 NG/L
4HR DELTA HS TROPONIN: 1 NG/L
ALBUMIN SERPL BCP-MCNC: 3.6 G/DL (ref 3.5–5)
ALP SERPL-CCNC: 105 U/L (ref 34–104)
ALT SERPL W P-5'-P-CCNC: 69 U/L (ref 7–52)
ANION GAP SERPL CALCULATED.3IONS-SCNC: 11 MMOL/L
ANION GAP SERPL CALCULATED.3IONS-SCNC: 13 MMOL/L
AST SERPL W P-5'-P-CCNC: 52 U/L (ref 13–39)
BASOPHILS # BLD AUTO: 0.12 THOUSANDS/ÂΜL (ref 0–0.1)
BASOPHILS NFR BLD AUTO: 1 % (ref 0–1)
BILIRUB SERPL-MCNC: 0.48 MG/DL (ref 0.2–1)
BNP SERPL-MCNC: 93 PG/ML (ref 0–100)
BUN SERPL-MCNC: 18 MG/DL (ref 5–25)
BUN SERPL-MCNC: 18 MG/DL (ref 5–25)
CALCIUM SERPL-MCNC: 7.8 MG/DL (ref 8.4–10.2)
CALCIUM SERPL-MCNC: 8.3 MG/DL (ref 8.4–10.2)
CARDIAC TROPONIN I PNL SERPL HS: 13 NG/L
CARDIAC TROPONIN I PNL SERPL HS: 15 NG/L
CARDIAC TROPONIN I PNL SERPL HS: 16 NG/L
CHLORIDE SERPL-SCNC: 101 MMOL/L (ref 96–108)
CHLORIDE SERPL-SCNC: 96 MMOL/L (ref 96–108)
CHOLEST SERPL-MCNC: 170 MG/DL
CO2 SERPL-SCNC: 25 MMOL/L (ref 21–32)
CO2 SERPL-SCNC: 26 MMOL/L (ref 21–32)
CREAT SERPL-MCNC: 1.23 MG/DL (ref 0.6–1.3)
CREAT SERPL-MCNC: 1.34 MG/DL (ref 0.6–1.3)
EOSINOPHIL # BLD AUTO: 1.58 THOUSAND/ÂΜL (ref 0–0.61)
EOSINOPHIL NFR BLD AUTO: 16 % (ref 0–6)
ERYTHROCYTE [DISTWIDTH] IN BLOOD BY AUTOMATED COUNT: 17 % (ref 11.6–15.1)
GFR SERPL CREATININE-BSD FRML MDRD: 58 ML/MIN/1.73SQ M
GFR SERPL CREATININE-BSD FRML MDRD: 65 ML/MIN/1.73SQ M
GLUCOSE P FAST SERPL-MCNC: 193 MG/DL (ref 65–99)
GLUCOSE SERPL-MCNC: 188 MG/DL (ref 65–140)
GLUCOSE SERPL-MCNC: 244 MG/DL (ref 65–140)
HCT VFR BLD AUTO: 33.1 % (ref 36.5–49.3)
HDLC SERPL-MCNC: 55 MG/DL
HGB BLD-MCNC: 10.3 G/DL (ref 12–17)
IMM GRANULOCYTES # BLD AUTO: 0.07 THOUSAND/UL (ref 0–0.2)
IMM GRANULOCYTES NFR BLD AUTO: 1 % (ref 0–2)
LDLC SERPL CALC-MCNC: 86 MG/DL (ref 0–100)
LYMPHOCYTES # BLD AUTO: 1.34 THOUSANDS/ÂΜL (ref 0.6–4.47)
LYMPHOCYTES NFR BLD AUTO: 14 % (ref 14–44)
MCH RBC QN AUTO: 25.4 PG (ref 26.8–34.3)
MCHC RBC AUTO-ENTMCNC: 31.1 G/DL (ref 31.4–37.4)
MCV RBC AUTO: 82 FL (ref 82–98)
MONOCYTES # BLD AUTO: 0.81 THOUSAND/ÂΜL (ref 0.17–1.22)
MONOCYTES NFR BLD AUTO: 8 % (ref 4–12)
NEUTROPHILS # BLD AUTO: 5.94 THOUSANDS/ÂΜL (ref 1.85–7.62)
NEUTS SEG NFR BLD AUTO: 60 % (ref 43–75)
NRBC BLD AUTO-RTO: 0 /100 WBCS
PLATELET # BLD AUTO: 290 THOUSANDS/UL (ref 149–390)
PMV BLD AUTO: 10.2 FL (ref 8.9–12.7)
POTASSIUM SERPL-SCNC: 3.6 MMOL/L (ref 3.5–5.3)
POTASSIUM SERPL-SCNC: 4.4 MMOL/L (ref 3.5–5.3)
PROT SERPL-MCNC: 6.3 G/DL (ref 6.4–8.4)
RBC # BLD AUTO: 4.06 MILLION/UL (ref 3.88–5.62)
SODIUM SERPL-SCNC: 135 MMOL/L (ref 135–147)
SODIUM SERPL-SCNC: 137 MMOL/L (ref 135–147)
TRIGL SERPL-MCNC: 146 MG/DL
WBC # BLD AUTO: 9.86 THOUSAND/UL (ref 4.31–10.16)

## 2023-10-06 PROCEDURE — 84484 ASSAY OF TROPONIN QUANT: CPT

## 2023-10-06 PROCEDURE — 99285 EMERGENCY DEPT VISIT HI MDM: CPT

## 2023-10-06 PROCEDURE — 83880 ASSAY OF NATRIURETIC PEPTIDE: CPT

## 2023-10-06 PROCEDURE — 71045 X-RAY EXAM CHEST 1 VIEW: CPT

## 2023-10-06 PROCEDURE — 82948 REAGENT STRIP/BLOOD GLUCOSE: CPT

## 2023-10-06 PROCEDURE — 80048 BASIC METABOLIC PNL TOTAL CA: CPT

## 2023-10-06 PROCEDURE — 96374 THER/PROPH/DIAG INJ IV PUSH: CPT

## 2023-10-06 PROCEDURE — 85025 COMPLETE CBC W/AUTO DIFF WBC: CPT

## 2023-10-06 PROCEDURE — 99223 1ST HOSP IP/OBS HIGH 75: CPT | Performed by: INTERNAL MEDICINE

## 2023-10-06 PROCEDURE — 99285 EMERGENCY DEPT VISIT HI MDM: CPT | Performed by: EMERGENCY MEDICINE

## 2023-10-06 PROCEDURE — 93005 ELECTROCARDIOGRAM TRACING: CPT

## 2023-10-06 PROCEDURE — 80053 COMPREHEN METABOLIC PANEL: CPT

## 2023-10-06 PROCEDURE — 36415 COLL VENOUS BLD VENIPUNCTURE: CPT

## 2023-10-06 PROCEDURE — 80061 LIPID PANEL: CPT

## 2023-10-06 RX ORDER — INSULIN LISPRO 100 [IU]/ML
1-6 INJECTION, SOLUTION INTRAVENOUS; SUBCUTANEOUS
Status: DISCONTINUED | OUTPATIENT
Start: 2023-10-06 | End: 2023-10-12 | Stop reason: HOSPADM

## 2023-10-06 RX ORDER — BUMETANIDE 0.25 MG/ML
5 INJECTION INTRAMUSCULAR; INTRAVENOUS 3 TIMES DAILY
Status: DISCONTINUED | OUTPATIENT
Start: 2023-10-07 | End: 2023-10-08

## 2023-10-06 RX ORDER — BUDESONIDE AND FORMOTEROL FUMARATE DIHYDRATE 160; 4.5 UG/1; UG/1
2 AEROSOL RESPIRATORY (INHALATION) 2 TIMES DAILY
Status: DISCONTINUED | OUTPATIENT
Start: 2023-10-07 | End: 2023-10-12 | Stop reason: HOSPADM

## 2023-10-06 RX ORDER — METOPROLOL SUCCINATE 50 MG/1
50 TABLET, EXTENDED RELEASE ORAL DAILY
Status: DISCONTINUED | OUTPATIENT
Start: 2023-10-07 | End: 2023-10-12 | Stop reason: HOSPADM

## 2023-10-06 RX ORDER — FUROSEMIDE 10 MG/ML
40 INJECTION INTRAMUSCULAR; INTRAVENOUS ONCE
Status: COMPLETED | OUTPATIENT
Start: 2023-10-06 | End: 2023-10-06

## 2023-10-06 RX ORDER — MONTELUKAST SODIUM 10 MG/1
10 TABLET ORAL
Status: DISCONTINUED | OUTPATIENT
Start: 2023-10-06 | End: 2023-10-12 | Stop reason: HOSPADM

## 2023-10-06 RX ORDER — BUMETANIDE 0.25 MG/ML
3 INJECTION INTRAMUSCULAR; INTRAVENOUS ONCE
Status: COMPLETED | OUTPATIENT
Start: 2023-10-06 | End: 2023-10-07

## 2023-10-06 RX ORDER — ALBUTEROL SULFATE 90 UG/1
2 AEROSOL, METERED RESPIRATORY (INHALATION) EVERY 4 HOURS PRN
Status: DISCONTINUED | OUTPATIENT
Start: 2023-10-06 | End: 2023-10-12 | Stop reason: HOSPADM

## 2023-10-06 RX ORDER — FUROSEMIDE 10 MG/ML
100 INJECTION INTRAMUSCULAR; INTRAVENOUS ONCE
Status: SHIPPED | OUTPATIENT
Start: 2023-10-06

## 2023-10-06 RX ORDER — OXYMETAZOLINE HYDROCHLORIDE 0.05 G/100ML
2 SPRAY NASAL ONCE
Status: COMPLETED | OUTPATIENT
Start: 2023-10-06 | End: 2023-10-06

## 2023-10-06 RX ORDER — PANTOPRAZOLE SODIUM 40 MG/1
40 TABLET, DELAYED RELEASE ORAL DAILY
Status: DISCONTINUED | OUTPATIENT
Start: 2023-10-07 | End: 2023-10-12 | Stop reason: HOSPADM

## 2023-10-06 RX ORDER — SPIRONOLACTONE 25 MG/1
25 TABLET ORAL DAILY
Status: DISCONTINUED | OUTPATIENT
Start: 2023-10-07 | End: 2023-10-12 | Stop reason: HOSPADM

## 2023-10-06 RX ORDER — INSULIN LISPRO 100 [IU]/ML
2-12 INJECTION, SOLUTION INTRAVENOUS; SUBCUTANEOUS
Status: DISCONTINUED | OUTPATIENT
Start: 2023-10-07 | End: 2023-10-12 | Stop reason: HOSPADM

## 2023-10-06 RX ORDER — ATORVASTATIN CALCIUM 10 MG/1
10 TABLET, FILM COATED ORAL DAILY
Status: DISCONTINUED | OUTPATIENT
Start: 2023-10-07 | End: 2023-10-12 | Stop reason: HOSPADM

## 2023-10-06 RX ORDER — PREGABALIN 50 MG/1
50 CAPSULE ORAL
Status: DISCONTINUED | OUTPATIENT
Start: 2023-10-06 | End: 2023-10-12 | Stop reason: HOSPADM

## 2023-10-06 RX ADMIN — FUROSEMIDE 40 MG: 10 INJECTION, SOLUTION INTRAMUSCULAR; INTRAVENOUS at 20:58

## 2023-10-06 RX ADMIN — OXYMETAZOLINE HYDROCHLORIDE 2 SPRAY: 0.05 SPRAY NASAL at 19:24

## 2023-10-06 NOTE — TELEPHONE ENCOUNTER
He can come for nurse visit for IV lasix 100mg today. What are his PADs yesterday and today? I saw the last one on 10/4 was 19.  Thanks

## 2023-10-06 NOTE — TELEPHONE ENCOUNTER
Spoke with pt. He states no change with taking the metolazone 5 mg. Still has edema blle, congested, and does not feel well. He feels maybe a little worse. He has not gotten the aldactone yet, pharmacy had to order. He is going to call the PCP today . He is so congested , he does not know if it could be bronchitis or pneumonia. Taking Bumex 5 mg bid              Potassium 40 meq bid              Jardiance 10 mg qd    Any changes?     Please advise

## 2023-10-06 NOTE — TELEPHONE ENCOUNTER
Called pt and LMOM that we are canceling the lasix nurse visit and he needs to get BMP today and call the PCP.

## 2023-10-06 NOTE — ED PROVIDER NOTES
History  Chief Complaint   Patient presents with   • Shortness of Breath     Hx CHF, doctor cancelled IV lasix today, pt took Bumex this am. Feeling slightly SOB satting 95% RA. Denies CP. A&Ox4      Patient is a 61-year-old male past medical history CHF hypertension diabetes presents for evaluation of worsening dyspnea on exertion x1 week. Patient reports that symptoms began gradually states that he was due to get IV Lasix with his cardiologist however this was canceled. Patient came in when he was still symptomatic states that he is short of breath even at rest now. Takes Bumex at home 5 mg. Denies any associated chest pain lightheadedness syncope abdominal pain nausea vomiting diarrhea diaphoresis fever chills back pain neck pain numbness weakness tingling or any other complaints at this time. Prior to Admission Medications   Prescriptions Last Dose Informant Patient Reported? Taking?    Accu-Chek FastClix Lancets MISC  Self No No   Sig: Test blood sugar twice daily   Blood Glucose Monitoring Suppl (Accu-Chek Guide) w/Device KIT  Self No No   Sig: Use 2 (two) times a day Test blood sugar twice daily   EPINEPHrine (EPIPEN) 0.3 mg/0.3 mL SOAJ  Self No No   Sig: Inject 0.3 mL (0.3 mg total) into a muscle once for 1 dose   Empagliflozin (JARDIANCE) 10 MG TABS tablet  Self No No   Sig: Take 1 tablet (10 mg total) by mouth daily   Fasenra Pen 30 MG/ML SOAJ  Self Yes No   Spiriva Respimat 1.25 MCG/ACT AERS inhaler  Self No No   Sig: INHALE 2 PUFFS BY MOUTH EVERY DAY   albuterol (PROVENTIL HFA,VENTOLIN HFA) 90 mcg/act inhaler   No No   Sig: Inhale 2 puffs every 4 (four) hours as needed for wheezing or shortness of breath   apixaban (Eliquis) 5 mg  Self No No   Sig: Take 1 tablet (5 mg total) by mouth 2 (two) times a day   atorvastatin (LIPITOR) 10 mg tablet  Self No No   Sig: TAKE 1 TABLET BY MOUTH EVERY DAY   benzonatate (TESSALON PERLES) 100 mg capsule  Self No No   Sig: Take 1 capsule (100 mg total) by mouth 3 (three) times a day as needed (for cough)   Patient not taking: Reported on 10/5/2023   budesonide-formoterol (Symbicort) 160-4.5 mcg/act inhaler  Self No No   Sig: Inhale 2 puffs 2 (two) times a day Rinse mouth after use. bumetanide (BUMEX) 1 mg tablet  Self No No   Sig: Take 5 tablets (5 mg total) by mouth 2 (two) times a day   levalbuterol (Xopenex) 1.25 mg/3 mL nebulizer solution  Self No No   Sig: Take 3 mL (1.25 mg total) by nebulization as needed for wheezing or shortness of breath   metolazone (ZAROXOLYN) 2.5 mg tablet  Self No No   Sig: Take 1 tablet by mouth as directed by provider. Take 20-30 minutes before Bumex dose and with additional potassium. metoprolol succinate (TOPROL-XL) 50 mg 24 hr tablet  Self No No   Sig: Take 1 tablet (50 mg total) by mouth daily   montelukast (SINGULAIR) 10 mg tablet  Self No No   Sig: Take 1 tablet (10 mg total) by mouth daily at bedtime   pantoprazole (PROTONIX) 40 mg tablet  Self No No   Sig: Take 1 tablet (40 mg total) by mouth daily   potassium chloride (K-DUR,KLOR-CON) 20 mEq tablet  Self No No   Sig: Take 2 tablets (40 mEq total) by mouth 2 (two) times a day   pregabalin (LYRICA) 50 mg capsule  Self No No   Sig: Take 1 caps.  at bedtime   sitaGLIPtin (Januvia) 100 mg tablet  Self No No   Sig: TAKE 1/2 TABLET DAILY   spironolactone (ALDACTONE) 25 mg tablet   No No   Sig: Take 1 tablet (25 mg total) by mouth daily      Facility-Administered Medications Last Administration Doses Remaining   furosemide (LASIX) injection 100 mg None recorded 1          Past Medical History:   Diagnosis Date   • Acute gastritis without hemorrhage     last assessed 3/24/17   • Asthma    • Atrial fibrillation [facility])    • Bilateral leg edema 02/19/2020   • CHF (congestive heart failure) ([facility])    • Coronary artery disease    • Daytime sleepiness 07/17/2019   • Diabetes mellitus ([address])    • Dyspnea on exertion 10/11/2021   • Edema of both feet 01/23/2020   • Gastric bypass status for obesity    • Hyperlipidemia    • Hypertension    • Hypokalemia 2021   • Impaired fasting glucose     last assessed 5/10/17   • Knee sprain, bilateral 2018   • Leg cramps 2022   • Obesity    • Viral gastroenteritis     last assessed 16       Past Surgical History:   Procedure Laterality Date   • CARDIAC CATHETERIZATION N/A 3/9/2022    Procedure: CARDIAC RHC W/ PRESSURE SENSOR;  Surgeon: Shady Magallon MD;  Location: [facility]; Service: Cardiology   • CARDIAC CATHETERIZATION N/A 2022    Procedure: Cardiac catheterization;  Surgeon: Austen Jackson DO;  Location: [facility]; Service: Cardiology   • CARDIAC CATHETERIZATION N/A 2022    Procedure: Cardiac Coronary Angiogram;  Surgeon: Austen Jackson DO;  Location: [facility]; Service: Cardiology   • CARPAL TUNNEL RELEASE      bilateral   • GASTRIC BYPASS      with han en y   • HERNIA REPAIR      ventral inscisional   • TONSILLECTOMY         Family History   Problem Relation Age of Onset   • Stroke Mother    • Hypertension Father    • Cancer Father    • COPD Father      I have reviewed and agree with the history as documented. E-Cigarette/Vaping   • E-Cigarette Use Never User      E-Cigarette/Vaping Substances   • Nicotine No    • THC No    • CBD No    • Flavoring No    • Other No    • Unknown No      Social History     Tobacco Use   • Smoking status: Former     Types: Pipe, Cigars     Start date:      Quit date: 2021     Years since quittin.7   • Smokeless tobacco: Never   • Tobacco comments:     cigar once a day   Vaping Use   • Vaping Use: Never used   Substance Use Topics   • Alcohol use: Yes     Alcohol/week: 2.0 standard drinks of alcohol     Types: 2 Shots of liquor per week     Comment: social   • Drug use: No        Review of Systems   Constitutional: Negative for chills and fever. Gained 7lb in 3 days   HENT: Positive for congestion.     Respiratory: Positive for cough and shortness of breath. Cardiovascular: Positive for leg swelling. Negative for chest pain. Gastrointestinal: Negative for abdominal pain, nausea and vomiting. Musculoskeletal: Negative for back pain and neck pain. Neurological: Negative for weakness, numbness and headaches. All other systems reviewed and are negative. Physical Exam  ED Triage Vitals [10/06/23 1733]   Temperature Pulse Respirations Blood Pressure SpO2   98 °F (36.7 °C) 83 (!) 24 137/68 95 %      Temp Source Heart Rate Source Patient Position - Orthostatic VS BP Location FiO2 (%)   Oral Monitor Lying Left arm --      Pain Score       --             Orthostatic Vital Signs  Vitals:    10/06/23 1733 10/06/23 1930 10/06/23 2100   BP: 137/68 133/64 142/65   Pulse: 83 80 80   Patient Position - Orthostatic VS: Lying Lying Lying       Physical Exam  Vitals and nursing note reviewed. Constitutional:       General: He is not in acute distress. Appearance: He is well-developed. He is obese. HENT:      Head: Normocephalic and atraumatic. Eyes:      Extraocular Movements: Extraocular movements intact. Conjunctiva/sclera: Conjunctivae normal.   Cardiovascular:      Rate and Rhythm: Normal rate and regular rhythm. Heart sounds: No murmur heard. Pulmonary:      Effort: Pulmonary effort is normal. No respiratory distress. Breath sounds: No decreased breath sounds. Comments: Sounds a bit winded while speaking  Abdominal:      General: Abdomen is protuberant. Palpations: Abdomen is soft. Tenderness: There is no abdominal tenderness. Musculoskeletal:         General: No swelling. Normal range of motion. Cervical back: Neck supple. Right lower leg: Edema present. Left lower leg: Edema present. Skin:     General: Skin is warm and dry. Capillary Refill: Capillary refill takes less than 2 seconds. Neurological:      General: No focal deficit present.       Mental Status: He is alert and oriented to person, place, and time.    Psychiatric:         Mood and Affect: Mood normal.         ED Medications  Medications   oxymetazoline (AFRIN) 0.05 % nasal spray 2 spray (2 sprays Each Nare Given 10/6/23 1924)   furosemide (LASIX) injection 40 mg (40 mg Intravenous Given 10/6/23 2058)       Diagnostic Studies  Results Reviewed     Procedure Component Value Units Date/Time    HS Troponin I 2hr [[id number]]  (Normal) Collected: 10/06/23 2007    Lab Status: Final result Specimen: Blood from Arm, Left Updated: 10/06/23 2044     hs TnI 2hr 13 ng/L      Delta 2hr hsTnI -2 ng/L     HS Troponin I 4hr [[id number]]     Lab Status: No result Specimen: Blood     B-Type Natriuretic Peptide(BNP) [[id number]]  (Normal) Collected: 10/06/23 1803    Lab Status: Final result Specimen: Blood from Arm, Left Updated: 10/06/23 1845     BNP 93 pg/mL     Comprehensive metabolic panel [[id number]]  (Abnormal) Collected: 10/06/23 1803    Lab Status: Final result Specimen: Blood from Arm, Left Updated: 10/06/23 1837     Sodium 137 mmol/L      Potassium 3.6 mmol/L      Chloride 101 mmol/L      CO2 25 mmol/L      ANION GAP 11 mmol/L      BUN 18 mg/dL      Creatinine 1.23 mg/dL      Glucose 244 mg/dL      Calcium 7.8 mg/dL      AST 52 U/L      ALT 69 U/L      Alkaline Phosphatase 105 U/L      Total Protein 6.3 g/dL      Albumin 3.6 g/dL      Total Bilirubin 0.48 mg/dL      eGFR 65 ml/min/1.73sq m     Narrative:      [place] guidelines for Chronic Kidney Disease (CKD):   •  Stage 1 with normal or high GFR (GFR > 90 mL/min/1.73 square meters)  •  Stage 2 Mild CKD (GFR = 60-89 mL/min/1.73 square meters)  •  Stage 3A Moderate CKD (GFR = 45-59 mL/min/1.73 square meters)  •  Stage 3B Moderate CKD (GFR = 30-44 mL/min/1.73 square meters)  •  Stage 4 Severe CKD (GFR = 15-29 mL/min/1.73 square meters)  •  Stage 5 End Stage CKD (GFR <15 mL/min/1.73 square meters)  Note: GFR calculation is accurate only with a steady state creatinine    HS Troponin 0hr (reflex protocol) [[id number]]  (Normal) Collected: 10/06/23 1803    Lab Status: Final result Specimen: Blood from Arm, Left Updated: 10/06/23 1837     hs TnI 0hr 15 ng/L     CBC and differential [[id number]]  (Abnormal) Collected: 10/06/23 1803    Lab Status: Final result Specimen: Blood from Arm, Left Updated: 10/06/23 1810     WBC 9.86 Thousand/uL      RBC 4.06 Million/uL      Hemoglobin 10.3 g/dL      Hematocrit 33.1 %      MCV 82 fL      MCH 25.4 pg      MCHC 31.1 g/dL      RDW 17.0 %      MPV 10.2 fL      Platelets 460 Thousands/uL      nRBC 0 /100 WBCs      Neutrophils Relative 60 %      Immat GRANS % 1 %      Lymphocytes Relative 14 %      Monocytes Relative 8 %      Eosinophils Relative 16 %      Basophils Relative 1 %      Neutrophils Absolute 5.94 Thousands/µL      Immature Grans Absolute 0.07 Thousand/uL      Lymphocytes Absolute 1.34 Thousands/µL      Monocytes Absolute 0.81 Thousand/µL      Eosinophils Absolute 1.58 Thousand/µL      Basophils Absolute 0.12 Thousands/µL                  XR chest 1 view portable   ED Interpretation by Rina Velez DO (10/06 1904)   Wet read - normal cxr              Procedures  Procedures      ED Course  ED Course as of 10/06/23 2150   Fri Oct 06, 2023   1758 Per chart review initially supposed to have IV lasix but cardiologist checked pulm a diastolics pt has goal of 15 he was 16, so lasix was cancelled.  Pt to ED because of worsening symptoms (TELLES/SOB)   1759 Will check cardiac labs, EKG, CXR, hold off on lasix for now    1800 Blood Pressure: 137/68   1800 Temperature: 98 °F (36.7 °C)   1800 Pulse: 83   1800 Respirations(!): 24   1800 SpO2: 95 %   1906 ECHO 4/2023 EF 50%   1906 hs TnI 0hr: 15  At baseline, likely demand, will 2 hr delta   1906 BUN: 18   1906 Creatinine: 1.23   1907 BNP: 93  improved   1907 CO2: 25 1949 Blood Pressure: 133/64   1949 Pulse: 80   1949 Respirations: 18   1949 SpO2: 96 %   2042 On reassessment pt's congestion much improved however pt still SOB same as before. Will give 40mg IV lasix. Pt states that if he comes in and gets discharged he comes back again in 2 days because he's still symptomatic, states that when he gets admitted he gets "flushed out" for a few days with diuretics and feels better   2048 Delta 2hr hsTnI: -2   2130 TT sent out to [address] Making  Patient is a 79-year-old male past medical history CHF presenting for evaluation of dyspnea on exertion    Likely CHF exacerbation will assess for anemia or electrolyte abnormalities pneumonia ACS    Plan: Labs chest x-ray. Final dispo pending    See ED course    Discussed with medicine attending believe that patient is fluid overloaded and clinically is worse than what his lab work shows. Patient admitted to medicine in stable condition. Amount and/or Complexity of Data Reviewed  Labs: ordered. Decision-making details documented in ED Course. Radiology: ordered and independent interpretation performed. Risk  OTC drugs. Prescription drug management. Decision regarding hospitalization.             Disposition  Final diagnoses:   Dyspnea on exertion   CHF (congestive heart failure) ([address])   Hypertension   Diabetes ([address])     Time reflects when diagnosis was documented in both MDM as applicable and the Disposition within this note     Time User Action Codes Description Comment    10/6/2023  7:21 PM Gio Neer Add [R06.09] Dyspnea on exertion     10/6/2023  7:21 PM Winchester Neer Add [I50.9] CHF (congestive heart failure) ([address])     10/6/2023  7:21 PM Gio Neer Add [I10] Hypertension     10/6/2023  7:21 PM Winchester Neer Add [E11.9] Diabetes [facility])       ED Disposition     ED Disposition   Admit    Condition   Stable    Date/Time   Fri Oct 6, 2023  9:49 PM    Comment              Follow-up Information     Follow up With Specialties Details Why Clayton Joyce MD [address]  [phone]            Patient's Medications   Discharge Prescriptions    No medications on file     No discharge procedures on file. PDMP Review       Value Time User    PDMP Reviewed  Yes 9/23/2021 12:07 AM Darius DO [place]           ED Provider  Attending physically available and evaluated Keny Hatfield. I managed the patient along with the ED Attending.     Electronically Signed by         Mimi Sinclair DO  10/07/23 8734

## 2023-10-06 NOTE — TELEPHONE ENCOUNTER
He is going right now to get BMP drawn. He said if he continues to feels this bad or gets worse this weekend , he will go to the ER.       Thank you

## 2023-10-06 NOTE — TELEPHONE ENCOUNTER
MD Andrae Iglesias RN  Hi, please check on him tomorrow. Volume up and did not respond to metolazone 2.5. advised to take 5mg today. Also starting him spironolactone. Thanks.

## 2023-10-06 NOTE — ED ATTENDING ATTESTATION
10/6/2023  I, Anders Chao MD, saw and evaluated the patient. I have discussed the patient with the resident/non-physician practitioner and agree with the resident's/non-physician practitioner's findings, Plan of Care, and MDM as documented in the resident's/non-physician practitioner's note, except where noted. All available labs and Radiology studies were reviewed. I was present for key portions of any procedure(s) performed by the resident/non-physician practitioner and I was immediately available to provide assistance. At this point I agree with the current assessment done in the Emergency Department. I have conducted an independent evaluation of this patient a history and physical is as follows:    ED Course     Patient presents for evaluation secondary to 1 week of worsening shortness of breath with exertion. Patient has a history of congestive heart failure that is being followed closely by his cardiologist.  Patient has a CardioMEMS device in place and his cardiologist has been dosing Lasix based on those readings. Patient states that he was scheduled to receive IV Lasix today; however, his number was low enough that his cardiologist did not feel that he needed it. Patient does state that he is short of breath on exertion, has had weight gain, and has had lower extremity swelling. He states that when this is happened in the past, he has been sent home only to return 2 days later to be admitted for IV Lasix. Patient feels that he needs IV Lasix again. No additional complaints. Exam: AAOx3, NAD, RRR, CTA, S/NT/ND, bilateral lower extremity swelling. A/P: Chronic CHF. Will check labs and chest x-ray to evaluate for pulmonary edema. Will likely treat with IV diuretics and admit given history.     Critical Care Time  Procedures

## 2023-10-07 LAB
ANION GAP SERPL CALCULATED.3IONS-SCNC: 11 MMOL/L
BUN SERPL-MCNC: 21 MG/DL (ref 5–25)
CALCIUM SERPL-MCNC: 8 MG/DL (ref 8.4–10.2)
CHLORIDE SERPL-SCNC: 98 MMOL/L (ref 96–108)
CO2 SERPL-SCNC: 27 MMOL/L (ref 21–32)
CREAT SERPL-MCNC: 1.43 MG/DL (ref 0.6–1.3)
ERYTHROCYTE [DISTWIDTH] IN BLOOD BY AUTOMATED COUNT: 17.2 % (ref 11.6–15.1)
GFR SERPL CREATININE-BSD FRML MDRD: 54 ML/MIN/1.73SQ M
GLUCOSE SERPL-MCNC: 158 MG/DL (ref 65–140)
GLUCOSE SERPL-MCNC: 162 MG/DL (ref 65–140)
GLUCOSE SERPL-MCNC: 167 MG/DL (ref 65–140)
GLUCOSE SERPL-MCNC: 210 MG/DL (ref 65–140)
GLUCOSE SERPL-MCNC: 228 MG/DL (ref 65–140)
HCT VFR BLD AUTO: 32.3 % (ref 36.5–49.3)
HGB BLD-MCNC: 9.8 G/DL (ref 12–17)
MAGNESIUM SERPL-MCNC: 1.9 MG/DL (ref 1.9–2.7)
MCH RBC QN AUTO: 24.9 PG (ref 26.8–34.3)
MCHC RBC AUTO-ENTMCNC: 30.3 G/DL (ref 31.4–37.4)
MCV RBC AUTO: 82 FL (ref 82–98)
PLATELET # BLD AUTO: 249 THOUSANDS/UL (ref 149–390)
PMV BLD AUTO: 10.2 FL (ref 8.9–12.7)
POTASSIUM SERPL-SCNC: 3.8 MMOL/L (ref 3.5–5.3)
RBC # BLD AUTO: 3.94 MILLION/UL (ref 3.88–5.62)
SARS-COV-2 RNA RESP QL NAA+PROBE: NEGATIVE
SODIUM SERPL-SCNC: 136 MMOL/L (ref 135–147)
WBC # BLD AUTO: 8.09 THOUSAND/UL (ref 4.31–10.16)

## 2023-10-07 PROCEDURE — 99223 1ST HOSP IP/OBS HIGH 75: CPT | Performed by: INTERNAL MEDICINE

## 2023-10-07 PROCEDURE — 85027 COMPLETE CBC AUTOMATED: CPT | Performed by: INTERNAL MEDICINE

## 2023-10-07 PROCEDURE — 87635 SARS-COV-2 COVID-19 AMP PRB: CPT | Performed by: INTERNAL MEDICINE

## 2023-10-07 PROCEDURE — 82948 REAGENT STRIP/BLOOD GLUCOSE: CPT

## 2023-10-07 PROCEDURE — 80048 BASIC METABOLIC PNL TOTAL CA: CPT | Performed by: INTERNAL MEDICINE

## 2023-10-07 PROCEDURE — 99232 SBSQ HOSP IP/OBS MODERATE 35: CPT | Performed by: FAMILY MEDICINE

## 2023-10-07 PROCEDURE — 83735 ASSAY OF MAGNESIUM: CPT | Performed by: INTERNAL MEDICINE

## 2023-10-07 RX ORDER — LANOLIN ALCOHOL/MO/W.PET/CERES
6 CREAM (GRAM) TOPICAL
Status: DISCONTINUED | OUTPATIENT
Start: 2023-10-07 | End: 2023-10-12 | Stop reason: HOSPADM

## 2023-10-07 RX ADMIN — BUMETANIDE 5 MG: 0.25 INJECTION INTRAMUSCULAR; INTRAVENOUS at 20:57

## 2023-10-07 RX ADMIN — INSULIN LISPRO 1 UNITS: 100 INJECTION, SOLUTION INTRAVENOUS; SUBCUTANEOUS at 00:37

## 2023-10-07 RX ADMIN — Medication 6 MG: at 21:00

## 2023-10-07 RX ADMIN — MONTELUKAST 10 MG: 10 TABLET, FILM COATED ORAL at 21:00

## 2023-10-07 RX ADMIN — SPIRONOLACTONE 25 MG: 25 TABLET ORAL at 10:00

## 2023-10-07 RX ADMIN — BUMETANIDE 5 MG: 0.25 INJECTION INTRAMUSCULAR; INTRAVENOUS at 10:00

## 2023-10-07 RX ADMIN — INSULIN LISPRO 2 UNITS: 100 INJECTION, SOLUTION INTRAVENOUS; SUBCUTANEOUS at 06:00

## 2023-10-07 RX ADMIN — BUMETANIDE 3 MG: 0.25 INJECTION INTRAMUSCULAR; INTRAVENOUS at 00:38

## 2023-10-07 RX ADMIN — INSULIN LISPRO 2 UNITS: 100 INJECTION, SOLUTION INTRAVENOUS; SUBCUTANEOUS at 21:01

## 2023-10-07 RX ADMIN — APIXABAN 5 MG: 5 TABLET, FILM COATED ORAL at 10:00

## 2023-10-07 RX ADMIN — APIXABAN 5 MG: 5 TABLET, FILM COATED ORAL at 18:24

## 2023-10-07 RX ADMIN — PANTOPRAZOLE SODIUM 40 MG: 40 TABLET, DELAYED RELEASE ORAL at 05:51

## 2023-10-07 RX ADMIN — ATORVASTATIN CALCIUM 10 MG: 10 TABLET, FILM COATED ORAL at 10:00

## 2023-10-07 RX ADMIN — PREGABALIN 50 MG: 50 CAPSULE ORAL at 00:04

## 2023-10-07 RX ADMIN — EMPAGLIFLOZIN 10 MG: 10 TABLET, FILM COATED ORAL at 10:00

## 2023-10-07 RX ADMIN — MONTELUKAST 10 MG: 10 TABLET, FILM COATED ORAL at 00:04

## 2023-10-07 RX ADMIN — BUDESONIDE AND FORMOTEROL FUMARATE DIHYDRATE 2 PUFF: 160; 4.5 AEROSOL RESPIRATORY (INHALATION) at 18:24

## 2023-10-07 RX ADMIN — INSULIN LISPRO 2 UNITS: 100 INJECTION, SOLUTION INTRAVENOUS; SUBCUTANEOUS at 18:23

## 2023-10-07 RX ADMIN — INSULIN LISPRO 4 UNITS: 100 INJECTION, SOLUTION INTRAVENOUS; SUBCUTANEOUS at 12:36

## 2023-10-07 RX ADMIN — BUMETANIDE 5 MG: 0.25 INJECTION INTRAMUSCULAR; INTRAVENOUS at 18:23

## 2023-10-07 RX ADMIN — BUDESONIDE AND FORMOTEROL FUMARATE DIHYDRATE 2 PUFF: 160; 4.5 AEROSOL RESPIRATORY (INHALATION) at 10:00

## 2023-10-07 RX ADMIN — METOPROLOL SUCCINATE 50 MG: 50 TABLET, EXTENDED RELEASE ORAL at 10:00

## 2023-10-07 RX ADMIN — PREGABALIN 50 MG: 50 CAPSULE ORAL at 21:00

## 2023-10-07 NOTE — UTILIZATION REVIEW
Initial Clinical Review    Admission: Date/Time/Statement:   Admission Orders (From admission, onward)     Ordered        10/06/23 2149  INPATIENT ADMISSION  Once                      Orders Placed This Encounter   Procedures   • INPATIENT ADMISSION     Standing Status:   Standing     Number of Occurrences:   1     Order Specific Question:   Level of Care     Answer:   Med Surg [16]     Order Specific Question:   Estimated length of stay     Answer:   More than 2 Midnights     Order Specific Question:   Certification     Answer:   I certify that inpatient services are medically necessary for this patient for a duration of greater than two midnights. See H&P and MD Progress Notes for additional information about the patient's course of treatment. ED Arrival Information     Expected   -    Arrival   10/6/2023 17:28    Acuity   Urgent            Means of arrival   Ambulance    Escorted by   BRADEN Sabillon)    Service   Hospitalist    Admission type   Emergency            Arrival complaint   SOB           Chief Complaint   Patient presents with   • Shortness of Breath     Hx CHF, doctor cancelled IV lasix today, pt took Bumex this am. Feeling slightly SOB satting 95% RA. Denies CP. A&Ox4        Initial Presentation: 66 y.o. male who presented to [address] [place] [address] ED via EMS due to worsening shortness of breath for 3-4 days, increasing BL LE edema, 7 lb weight gain. Also c/o nasal congestion, sore throat. Started on spironolactone by cardiologist and advised to take metolazone 5 mg with no improvement noted. In ED, tachypneic, noted for volume overload, IV Lasix given. PMHx:  chronic HFpEF 50% following with advanced heart failure cardiologist (Dr. Dominique Monge), paroxysmal atrial fibrillation anticoagulated on Eliquis, morbid obesity, severe persistent asthma, VICKY not currently on CPAP as device currently recalled, type 2 diabetes on oral medication, hypertension, chronic disease stage IIIa.    Plan:  Admit to Inpatient status dt CHF, med surg, telemetry, HF consult, monitor IO and daily wts, volume status, low Na and fluid restricted diet, daily bmp, monitor electrolytes and replete prn, monitor renal function, start accuchecks w/ SSI. Date: 10/7   Day 2: Per Heart Failure: Volume up on exam today, recent CardioMems PAD 18-19mmHg, now up to mid 20's. Shortness of breath and leg swelling continues. Increase Bumex 3 mg IV tid, trend BP with diuresis. Continue above tx plan including monitor electrolytes and replete prn, renal function, accuchecks.     ED Triage Vitals   Temperature Pulse Respirations Blood Pressure SpO2   10/06/23 1733 10/06/23 1733 10/06/23 1733 10/06/23 1733 10/06/23 1733   98 °F (36.7 °C) 83 (!) 24 137/68 95 %      Temp Source Heart Rate Source Patient Position - Orthostatic VS BP Location FiO2 (%)   10/06/23 1733 10/06/23 1733 10/06/23 1733 10/06/23 1733 --   Oral Monitor Lying Left arm       Pain Score       10/06/23 2241       No Pain          Wt Readings from Last 1 Encounters:   10/07/23 (!) 159 kg (349 lb 9.6 oz)     Additional Vital Signs:   Date/Time Temp Pulse Resp BP MAP (mmHg) SpO2 O2 Device Patient Position - Orthostatic VS   10/07/23 11:19:12 99.1 °F (37.3 °C) 85 -- 135/78 97 96 % -- --   10/07/23 10:26:15 -- 83 -- 133/80 98 95 % -- --   10/07/23 1000 -- 81 -- 133/80 -- -- -- --   10/07/23 07:16:47 99.3 °F (37.4 °C) 88 16 141/78 99 94 % None (Room air) Lying   10/07/23 02:12:39 -- 84 -- 119/56 77 94 % -- --   10/06/23 22:41:44 99.9 °F (37.7 °C) 91 -- 140/78 99 95 % -- --   10/06/23 22:36:20 -- 92 -- 140/78 99 94 % -- --   10/06/23 2100 -- 80 16 142/65 94 95 % None (Room air) Lying   10/06/23 1930 -- 80 18 133/64 92 96 % None (Room air) Lying   10/06/23 1738 -- -- -- -- -- -- None (Room air) --   10/06/23 1733 98 °F (36.7 °C) 83 24 Abnormal  137/68 -- 95 % None (Room air) Lying     Pertinent Labs/Diagnostic Test Results:   XR chest 1 view portable   ED Interpretation by Eileen Mckeon 6[address] [facility] (10/06 1904)   Kaylee Márquez read - normal cxr        Final Result by Shanell Cervantes MD (10/07 1367)      No acute cardiopulmonary disease.                   Workstation performed: [id number]           Results from last 7 days   Lab Units 10/07/23  0006   SARS-COV-2  Negative     Results from last 7 days   Lab Units 10/07/23  0544 10/06/23  1803   WBC Thousand/uL 8.09 9.86   HEMOGLOBIN g/dL 9.8* 10.3*   HEMATOCRIT % 32.3* 33.1*   PLATELETS Thousands/uL 249 290   NEUTROS ABS Thousands/µL  --  5.94         Results from last 7 days   Lab Units 10/07/23  0544 10/06/23  1803 10/06/23  1402   SODIUM mmol/L 136 137 135   POTASSIUM mmol/L 3.8 3.6 4.4   CHLORIDE mmol/L 98 101 96   CO2 mmol/L 27 25 26   ANION GAP mmol/L 11 11 13   BUN mg/dL 21 18 18   CREATININE mg/dL 1.43* 1.23 1.34*   EGFR ml/min/1.73sq m 54 65 58   CALCIUM mg/dL 8.0* 7.8* 8.3*   MAGNESIUM mg/dL 1.9  --   --      Results from last 7 days   Lab Units 10/06/23  1803   AST U/L 52*   ALT U/L 69*   ALK PHOS U/L 105*   TOTAL PROTEIN g/dL 6.3*   ALBUMIN g/dL 3.6   TOTAL BILIRUBIN mg/dL 0.48     Results from last 7 days   Lab Units 10/07/23  1200 10/07/23  0549 10/06/23  2348   POC GLUCOSE mg/dl 210* 158* 188*     Results from last 7 days   Lab Units 10/07/23  0544 10/06/23  1803   GLUCOSE RANDOM mg/dL 162* 244*     Results from last 7 days   Lab Units 10/06/23  2209 10/06/23  2007 10/06/23  1803   HS TNI 0HR ng/L  --   --  15   HS TNI 2HR ng/L  --  13  --    HSTNI D2 ng/L  --  -2  --    HS TNI 4HR ng/L 16  --   --    HSTNI D4 ng/L 1  --   --      Results from last 7 days   Lab Units 10/06/23  1803   BNP pg/mL 93     ED Treatment:   Medication Administration from 10/06/2023 1728 to 10/06/2023 2228       Date/Time Order Dose Route Action     10/06/2023 1924 EDT oxymetazoline (AFRIN) 0.05 % nasal spray 2 spray 2 spray Each Nare Given     10/06/2023 2058 EDT furosemide (LASIX) injection 40 mg 40 mg Intravenous Given        Past Medical History:   Diagnosis Date • Acute gastritis without hemorrhage     last assessed 3/24/17   • Asthma    • Atrial fibrillation [facility])    • Bilateral leg edema 02/19/2020   • CHF (congestive heart failure) ([facility])    • Coronary artery disease    • Daytime sleepiness 07/17/2019   • Diabetes mellitus ([address])    • Dyspnea on exertion 10/11/2021   • Edema of both feet 01/23/2020   • Gastric bypass status for obesity    • Hyperlipidemia    • Hypertension    • Hypokalemia 11/14/2021   • Impaired fasting glucose     last assessed 5/10/17   • Knee sprain, bilateral 06/14/2018   • Leg cramps 06/16/2022   • Obesity    • Viral gastroenteritis     last assessed 11/4/16     Present on Admission:  • Acute on chronic diastolic congestive heart failure ([facility])  • Morbid obesity due to excess calories ([facility])  • Paroxysmal atrial fibrillation ([facility])  • Severe persistent asthma without complication  • Stage 3a chronic kidney disease ([facility])  • Type 2 diabetes mellitus, without long-term current use of insulin ([facility])  • Nasal congestion  • VICKY (obstructive sleep apnea)      Admitting Diagnosis: Shortness of breath [R06.02]  Diabetes ([facility]) [E11.9]  CHF (congestive heart failure) ([facility]) [I50.9]  Dyspnea on exertion [R06.09]  Hypertension [I10]  Acute on chronic diastolic congestive heart failure ([facility]) [I50.33]  Age/Sex: 66 y.o. male  Admission Orders:  Scheduled Medications:  apixaban, 5 mg, Oral, BID  atorvastatin, 10 mg, Oral, Daily  budesonide-formoterol, 2 puff, Inhalation, BID  bumetanide, 5 mg, Intravenous, TID  Empagliflozin, 10 mg, Oral, Daily  insulin lispro, 1-6 Units, Subcutaneous, HS  insulin lispro, 2-12 Units, Subcutaneous, TID AC  metoprolol succinate, 50 mg, Oral, Daily  montelukast, 10 mg, Oral, HS  pantoprazole, 40 mg, Oral, Daily  pregabalin, 50 mg, Oral, HS  spironolactone, 25 mg, Oral, Daily      Continuous IV Infusions: none     PRN Meds:  albuterol, 2 puff, Inhalation, Q4H PRN    scd  Inc spirometry    IP CONSULT TO HEART FAILURE SERVICE  IP CONSULT TO NUTRITION SERVICES    Network Utilization Review Department  ATTENTION: Please call with any questions or concerns to [phone] and carefully listen to the prompts so that you are directed to the right person. All voicemails are confidential.   For Discharge needs, contact Care Management DC Support Team at [phone] opt. 2  Send all requests for admission clinical reviews, approved or denied determinations and any other requests to dedicated fax number below belonging to the campus where the patient is receiving treatment.  List of dedicated fax numbers for the Facilities:  [place] DENIALS (Administrative/Medical Necessity) [phone]   DISCHARGE SUPPORT TEAM (NETWORK) [id number] [facility] (Maternity/NICU/Pediatrics) [phone]   [address] [facility] [phone]   [address] [place] [address] [address] [address] [facility] [address] [facility]  [address] [phone]

## 2023-10-07 NOTE — CONSULTS
Heart Failure/ Pulmonary Hypertension Progress Note - Brenda Perez 66 y.o. male MRN: [id number]    Unit/Bed#: -01 Encounter: [id number]      Assessment:    Principal Problem:    Acute on chronic diastolic congestive heart failure (HCC)  Active Problems:    Type 2 diabetes mellitus, without long-term current use of insulin (HCC)    Morbid obesity due to excess calories (HCC)    VICKY (obstructive sleep apnea)    Paroxysmal atrial fibrillation (HCC)    Stage 3a chronic kidney disease (HCC)    Severe persistent asthma without complication    Nasal congestion      # Acute on Chronic HFpEF- HFimpEF, Stage C, NYHA III     Volume up on exam today, recent CardioMems PAD 18-19mmHg, now up to mid 20's   Diuretic: recent increase to Bumex 3 mg BID w/ K 40 meq BID   Weight:  349 lbs (of note he was 314 lbs on an admit in 2022)  BNP: 8/4/23: 122  NT proBNP: 7/29/22: 903   609 4/8/22     cardiomems in place, last read June Studies- personally reviewed by me    Echo 4/11/23:  LVEF: 50%  Grade 2 DD  RV: normal    LHC 9/6/22: No obstructive CAD     Pharm Nuc Stress 9/2/22: Abnormal study due to the presence of an inferior and inferolateral infarct without significant ischemia    RHC 3/9/22:    RA 4    RV 35/4    PA 35/12/21    PCWP 10    PA sat 64%    Travis CO 5.56 L/min    Travis CI 2.2L/min/m2    PVR 1.97 SAGASTUME        TTE 11/12/2021:   LVEF 55%. LVIDd 6.0 cm. Grade 1 DD. Normal RV. Trace TR.   TTE 07/19/2021:   LVEF 50%. LVIDd 6.13 cm. Grade 1 DD. Normal RV. Trace MR and TR. Mildly dilated IVC.     TTE 03/25/2020:   LVEF 40-45%. LVIDd 6.12 cm. Normal RV.         Neurohormonal Blockade:    --Beta Blocker: metoprolol succinate 50 mg daily.     --ARNi / ACEi / ARB: No.     --Aldosterone Antagonist: spironolactone 25 mg daily    --SGLT2 Inhibitor: Jardiance 10 mg   --Diuretic: Bumex 5 mg BID, K 40 meq BID, prn metolazone  Inpt: Bumex 5 mg IV TID      Sudden Cardiac Death Risk Reduction:    --LVEF >35%.          Electrical Resynchronization:    --Candidacy for BiV device: narrow QRS.          Advanced Therapies:  Will continue to monitor.  LVEF recovered        # Atrial fibrillation    [id number] = 3 (HF, HTN, DM).     Diagnosed 02/2022.    Anticoagulation on Eliquis.     Rate control: metoprolol 50 mg daily  Rhythm control: No.        # Hypertension, mostly controlled, amlodipine 5 mg, metoprolol      # Hyperlipidemia - atorvastatin 10 mg   10/6/23: LDL 86, HDL 55       # Diabetes mellitus, type II      Non-insulin dependent.    HbA1c 6/16/22: 5.7%        # Obstructive sleep apnea   # Chronic respiratory failure    # Asthma, moderate persistent     # S/p gastric bypass (Samuel-en-Y)surgery     # History of tobacco abuse    # Carpal tunnel syndrome, bilateral     # CKD, Cr 1.43  # s/p CardioMems implant 3/9/22    PAD 25 mmHg- most recently - which is up for him         Plan:  bumex 3 mg IV TID  cardiomems readings apparently not up much  Has gained non fluid weight as well  BP al ittle high, will trend with diuresis    HPI:    67 yo male with HFimpEF with predominant HFpEF now with multiple admits for volume overload. He is on high dose bumex 5 mg BID as outpt with prn metolazone. His morbid obesity complicates his treatment with BMI 46. Weight is up to 349 lbs (hospital records show weight around 314 lbs during an admit in 2022). He feels more SOB and more edema in legs - metolazone not of help as outpt    Review of Systems   Constitutional: Positive for unexpected weight change. Negative for activity change, appetite change and fatigue. HENT: Negative for congestion and nosebleeds. Eyes: Negative. Respiratory: Positive for shortness of breath. Negative for cough and chest tightness. Cardiovascular: Positive for leg swelling. Negative for chest pain and palpitations. Gastrointestinal: Negative for abdominal distention. Endocrine: Negative. Genitourinary: Negative. Musculoskeletal: Negative. Skin: Negative. Neurological: Negative for dizziness, syncope and weakness. Hematological: Negative. Psychiatric/Behavioral: Negative. [employer] (day, reason): Cotton catheter (day, reason):    Vitals: Blood pressure 141/78, pulse 88, temperature 99.3 °F (37.4 °C), temperature source Oral, resp. rate 16, height 6' 1" (1.854 m), weight (!) 159 kg (349 lb 9.6 oz), SpO2 94 %. , Body mass index is 46.12 kg/m²., I/O last 3 completed shifts:  In: -   Out: 800 [Urine:800]  I/O this shift:  In: 180 [P.O.:180]  Out: -   Wt Readings from Last 3 Encounters:   10/07/23 (!) 159 kg (349 lb 9.6 oz)   10/05/23 (!) 158 kg (349 lb)   08/23/23 (!) 157 kg (346 lb)       Intake/Output Summary (Last 24 hours) at 10/7/2023 0933  Last data filed at 10/7/2023 0915  Gross per 24 hour   Intake 180 ml   Output 800 ml   Net -620 ml     I/O last 3 completed shifts:  In: -   Out: 800 [Urine:800]    No significant arrhythmias seen on telemetry review.        Physical Exam:  Vitals:    10/06/23 2334 10/07/23 0212 10/07/23 0600 10/07/23 0716   BP:  119/56  141/78   BP Location:    Right arm   Pulse:  84  88   Resp:    16   Temp:    99.3 °F (37.4 °C)   TempSrc:    Oral   SpO2:  94%  94%   Weight: (!) 159 kg (349 lb 6.9 oz)  (!) 159 kg (349 lb 9.6 oz)    Height:           GEN: Amita Galvan appears well, alert and oriented x 3, pleasant and cooperative   HEENT: pupils equal, round, and reactive to light; extraocular muscles intact  NECK: supple, no carotid bruits   HEART: regular rhythm, normal S1 and S2, no murmurs, clicks, gallops or rubs, JVP is    LUNGS: clear to auscultation bilaterally; no wheezes, rales, or rhonchi   ABDOMEN: normal bowel sounds, soft, no tenderness, no distention  EXTREMITIES: peripheral pulses normal; no clubbing, cyanosis, or edema  NEURO: no focal findings   SKIN: normal without suspicious lesions on exposed skin      Current Facility-Administered Medications:   •  albuterol (PROVENTIL HFA,VENTOLIN HFA) inhaler 2 puff, 2 puff, Inhalation, Q4H PRN, Evorn Beat, DO  •  apixaban (ELIQUIS) tablet 5 mg, 5 mg, Oral, BID, Evorn Beat, DO  •  atorvastatin (LIPITOR) tablet 10 mg, 10 mg, Oral, Daily, Evorn Beat, DO  •  budesonide-formoterol (SYMBICORT) 160-4.5 mcg/act inhaler 2 puff, 2 puff, Inhalation, BID, Evorn Beat, DO  •  bumetanide (BUMEX) injection 5 mg, 5 mg, Intravenous, TID, Evorn Beat, DO  •  Empagliflozin (JARDIANCE) tablet 10 mg, 10 mg, Oral, Daily, Evorn Beat, DO  •  insulin lispro (HumaLOG) 100 units/mL subcutaneous injection 1-6 Units, 1-6 Units, Subcutaneous, HS, Evorn Beat, DO, 1 Units at 10/07/23 0037  •  insulin lispro (HumaLOG) 100 units/mL subcutaneous injection 2-12 Units, 2-12 Units, Subcutaneous, TID AC, 2 Units at 10/07/23 0600 **AND** Fingerstick Glucose (POCT), , , TID AC, Evorn Beat, DO  •  metoprolol succinate (TOPROL-XL) 24 hr tablet 50 mg, 50 mg, Oral, Daily, Evorn Beat, DO  •  montelukast (SINGULAIR) tablet 10 mg, 10 mg, Oral, HS, Evorn Beat, DO, 10 mg at 10/07/23 0004  •  pantoprazole (PROTONIX) EC tablet 40 mg, 40 mg, Oral, Daily, Evorn Beat, DO, 40 mg at 10/07/23 0551  •  pregabalin (LYRICA) capsule 50 mg, 50 mg, Oral, HS, Evorn Beat, DO, 50 mg at 10/07/23 0004  •  spironolactone (ALDACTONE) tablet 25 mg, 25 mg, Oral, Daily, Evorn Beat, DO      Labs & Results:        Results from last 7 days   Lab Units 10/07/23  0544 10/06/23  1803   WBC Thousand/uL 8.09 9.86   HEMOGLOBIN g/dL 9.8* 10.3*   HEMATOCRIT % 32.3* 33.1*   PLATELETS Thousands/uL 249 290         Results from last 7 days   Lab Units 10/07/23  0544 10/06/23  1803 10/06/23  1402   POTASSIUM mmol/L 3.8 3.6 4.4   CHLORIDE mmol/L 98 101 96   CO2 mmol/L 27 25 26   BUN mg/dL 21 18 18   CREATININE mg/dL 1.43* 1.23 1.34*   CALCIUM mg/dL 8.0* 7.8* 8.3*   ALK PHOS U/L  --  105*  --    ALT U/L  --  69*  --    AST U/L  --  52*  --            Counseling / Coordination of Care  Total floor / unit time spent today 40 minutes. Greater than 50% of total time was spent with the patient and / or family counseling and / or coordination of care. A description of the counseling / coordination of care: 25. Thank you for the opportunity to participate in the care of this patient.   Leny Gonzales PULMONARY HYPERTENSION  MEDICAL DIRECTOR OF [address] [place] [address]

## 2023-10-07 NOTE — PROGRESS NOTES
[address] [facility]  Progress Note  Name: Mak Bates  MRN: [id number]  Unit/Bed#: -23 I Date of Admission: 10/6/2023   Date of Service: 10/7/2023  Hospital Day: 1    Assessment/Plan   Nasal congestion  Assessment & Plan  · Patient reporting nasal congestion at this time without fevers additionally  · Follow-up result of COVID-19 PCR which has been ordered. Patient is on room air at this time, if positive start remdesivir and escalate as needed. If negative supportive care for symptoms as needed  · Can write for saline nasal spray as needed    Severe persistent asthma without complication  Assessment & Plan  · Does not appear in acute exacerbation, continue home inhalers/nebulizers/Singulair    Stage 3a chronic kidney disease [facility])  Assessment & Plan  Lab Results   Component Value Date    EGFR 54 10/07/2023    EGFR 65 10/06/2023    EGFR 58 10/06/2023    CREATININE 1.43 (H) 10/07/2023    CREATININE 1.23 10/06/2023    CREATININE 1.34 (H) 10/06/2023   · Renal function improved from recent values  · Monitor with BMP given need for diuresis  · Creatinine did bump up slightly today so continue to monitor closely. Paroxysmal atrial fibrillation (HCC)  Assessment & Plan  · Rate controlled on metoprolol succinate, continue at home dosing  · Continue anticoagulation with Eliquis    VICKY (obstructive sleep apnea)  Assessment & Plan  · Not currently on CPAP as patient awaiting new device (current device recalled)  · Advised follow-up with prescribing physician    Morbid obesity due to excess calories ([address])  Assessment & Plan  · Dietary and lifestyle modification advised.   Morbid obesity complicates all facets of care    Type 2 diabetes mellitus, without long-term current use of insulin [facility])  Assessment & Plan  Lab Results   Component Value Date    HGBA1C 7.1 (A) 08/09/2023       Recent Labs     10/06/23  2348 10/07/23  0549 10/07/23  1200   POCGLU 188* 158* 210*       Blood Sugar Average: Last 72 hrs:  · (P) [id number] well controlled as outpatient  · Continue with Jardiance given heart failure, hold off on other oral medications. Start correctional insulin coverage with Accu-Cheks while admitted  · Carb controlled diet  · Initiate hypoglycemia protocol    * Acute on chronic diastolic congestive heart failure ([address])  Assessment & Plan  Wt Readings from Last 3 Encounters:   10/07/23 (!) 159 kg (349 lb 9.6 oz)   10/05/23 (!) 158 kg (349 lb)   08/23/23 (!) 157 kg (346 lb)     · Presenting with worsening shortness of breath even at rest now. Reported increased lower extremity edema and at least 7 pound weight gain over the past 3-4 days despite dosing of metolazone as below  · S/p 40 mg IV Lasix during ED evaluation, give 3 mg IV Bumex now additionally  · Heart failure service consult for further diuretic recommendations, patient follows with Dr. Ludmila Wood. · Echocardiogram 4/11/2023 EF 37%, grade 2 diastolic dysfunction. Defer to heart failure service whether repeat echocardiogram is needed  · Continue remainder of cardiac medications  · Monitor daily weights, I/O, volume status  · Low-sodium, fluid strict diet  · Telemetry monitoring given high diuretic needs  · At least daily BMP while on IV diuresis, may need more frequently dependent on diuretic plan. Replace electrolytes as needed and monitor renal function  · Cardiology consultation appreciated. · On Bumex 5 mg 3 times daily currently  · Monitor creatinine. Slight bump today                 VTE Pharmacologic Prophylaxis:   Pharmacologic: Apixaban (Eliquis)  Mechanical VTE Prophylaxis in Place: Yes    Patient Centered Rounds: I have performed bedside rounds with nursing staff today. Time Spent for Care: 20 minutes. More than 50% of total time spent on counseling and coordination of care as described above. Current Length of Stay: 1 day(s)    Current Patient Status: Inpatient   Certification Statement:  The patient will continue to require additional inpatient hospital stay due to Needing IV diuresis    Discharge Plan: Anticipate patient will be here least another 48 hours    Code Status: Level 1 - Full Code      Subjective:   Patient seen and examined. States he feels slightly better than yesterday    Objective:     Vitals:   Temp (24hrs), Av.1 °F (37.3 °C), Min:98 °F (36.7 °C), Max:99.9 °F (37.7 °C)    Temp:  [98 °F (36.7 °C)-99.9 °F (37.7 °C)] 99.1 °F (37.3 °C)  HR:  [80-92] 85  Resp:  [16-24] 16  BP: (119-142)/(56-80) 135/78  SpO2:  [94 %-96 %] 96 %  Body mass index is 46.12 kg/m². Input and Output Summary (last 24 hours):        Intake/Output Summary (Last 24 hours) at 10/7/2023 1403  Last data filed at 10/7/2023 1242  Gross per 24 hour   Intake 420 ml   Output 800 ml   Net -380 ml       Physical Exam:     Physical Exam  (   General Appearance:    Alert, cooperative, no distress, appears stated age                               Lungs:    Faint crackles at the bases       Heart:    Regular rate and rhythm, S1 and S2 normal, no murmur, rub    or gallop   Abdomen:    Mildly distended nontender bowel sounds are present           Extremities:  1-2+ edema       Additional Data:     Labs:    Results from last 7 days   Lab Units 10/07/23  0544 10/06/23  1803   WBC Thousand/uL 8.09 9.86   HEMOGLOBIN g/dL 9.8* 10.3*   HEMATOCRIT % 32.3* 33.1*   PLATELETS Thousands/uL 249 290   NEUTROS PCT %  --  60   LYMPHS PCT %  --  14   MONOS PCT %  --  8   EOS PCT %  --  16*     Results from last 7 days   Lab Units 10/07/23  0544 10/06/23  1803   SODIUM mmol/L 136 137   POTASSIUM mmol/L 3.8 3.6   CHLORIDE mmol/L 98 101   CO2 mmol/L 27 25   BUN mg/dL 21 18   CREATININE mg/dL 1.43* 1.23   ANION GAP mmol/L 11 11   CALCIUM mg/dL 8.0* 7.8*   ALBUMIN g/dL  --  3.6   TOTAL BILIRUBIN mg/dL  --  0.48   ALK PHOS U/L  --  105*   ALT U/L  --  69*   AST U/L  --  52*   GLUCOSE RANDOM mg/dL 162* 244*         Results from last 7 days   Lab Units 10/07/23  1200 10/07/23  0549 10/06/23  2348   POC GLUCOSE mg/dl 210* 158* 188*                   * I Have Reviewed All Lab Data Listed Above. * Additional Pertinent Lab Tests Reviewed: All [facility] Admission Reviewed        Recent Cultures (last 7 days):           Last 24 Hours Medication List:   Current Facility-Administered Medications   Medication Dose Route Frequency Provider Last Rate   • albuterol  2 puff Inhalation Q4H PRN Jenny Buckley DO     • apixaban  5 mg Oral BID Jenny Buckley DO     • atorvastatin  10 mg Oral Daily Jenny Buckley DO     • budesonide-formoterol  2 puff Inhalation BID Jenny Buckley DO     • bumetanide  5 mg Intravenous TID Jenny Buckley DO     • Empagliflozin  10 mg Oral Daily Jenny Buckley DO     • insulin lispro  1-6 Units Subcutaneous HS Jenny Buckley DO     • insulin lispro  2-12 Units Subcutaneous TID AC Jenny Buckley DO     • melatonin  6 mg Oral HS Joe Calvin MD     • metoprolol succinate  50 mg Oral Daily Jenyn Buckley DO     • montelukast  10 mg Oral HS Jenny Buckley DO     • pantoprazole  40 mg Oral Daily Jenny Buckley DO     • pregabalin  50 mg Oral HS Jenny Buckley DO     • spironolactone  25 mg Oral Daily Jenny Buckley DO     • umeclidinium  1 puff Inhalation Daily Joe Calvin MD          Today, Patient Was Seen By: Richard Elder MD    ** Please Note: Dictation voice to text software may have been used in the creation of this document.  **

## 2023-10-07 NOTE — UTILIZATION REVIEW
NOTIFICATION OF INPATIENT ADMISSION   AUTHORIZATION REQUEST   SERVICING FACILITY:   [address] [place] [address]  Address: [address] [place] [address]  Tax ID: [id number]  NPI: [id number] ATTENDING PROVIDER:  Attending Name and NPI#: Christiano Gandara Md [[id number]]  Address: [address] [place] [address]  Phone: [phone]   ADMISSION INFORMATION:  [facility]   Place of Service Code: 21  Inpatient Admission Date/Time: 10/6/23  9:49 PM  Discharge Date/Time: No discharge date for patient encounter. Admitting Diagnosis Code/Description:  Shortness of breath [R06.02]  Diabetes ([address]) [E11.9]  CHF (congestive heart failure) ([address]) [I50.9]  Dyspnea on exertion [R06.09]  Hypertension [I10]  Acute on chronic diastolic congestive heart failure ([address]) [I50.33]     UTILIZATION REVIEW CONTACT:  Fernandez Sloan Utilization   Network Utilization Review Department  Phone: [phone]  Fax: [phone]  Email: [email or web address]  Contact for approvals/pending authorizations, clinical reviews, and discharge. PHYSICIAN ADVISORY SERVICES:  Medical Necessity Denial & Qdqu-az-Gmp[employer] Review  Phone: [phone]  Fax: [phone]  Email: [email or web address]     DISCHARGE SUPPORT TEAM:  For Patients Discharge Needs & Updates  Phone: [phone] opt. 2 Fax: [phone]  Email: [email or web address]

## 2023-10-07 NOTE — PLAN OF CARE
Problem: Prexisting or High Potential for Compromised Skin Integrity  Goal: Skin integrity is maintained or improved  Description: INTERVENTIONS:  - Identify patients at risk for skin breakdown  - Assess and monitor skin integrity  - Assess and monitor nutrition and hydration status  - Monitor labs   - Assess for incontinence   - Turn and reposition patient  - Assist with mobility/ambulation  - Relieve pressure over bony prominences  - Avoid friction and shearing  - Provide appropriate hygiene as needed including keeping skin clean and dry  - Evaluate need for skin moisturizer/barrier cream  - Collaborate with interdisciplinary team   - Patient/family teaching  - Consider wound care consult   Outcome: Progressing     Problem: CARDIOVASCULAR - ADULT  Goal: Maintains optimal cardiac output and hemodynamic stability  Description: INTERVENTIONS:  - Monitor I/O, vital signs and rhythm  - Monitor for S/S and trends of decreased cardiac output  - Administer and titrate ordered vasoactive medications to optimize hemodynamic stability  - Assess quality of pulses, skin color and temperature  - Assess for signs of decreased coronary artery perfusion  - Instruct patient to report change in severity of symptoms  Outcome: Progressing  Goal: Absence of cardiac dysrhythmias or at baseline rhythm  Description: INTERVENTIONS:  - Continuous cardiac monitoring, vital signs, obtain 12 lead EKG if ordered  - Administer antiarrhythmic and heart rate control medications as ordered  - Monitor electrolytes and administer replacement therapy as ordered  Outcome: Progressing     Problem: Nutrition/Hydration-ADULT  Goal: Nutrient/Hydration intake appropriate for improving, restoring or maintaining nutritional needs  Description: Monitor and assess patient's nutrition/hydration status for malnutrition. Collaborate with interdisciplinary team and initiate plan and interventions as ordered.   Monitor patient's weight and dietary intake as ordered or per policy. Utilize nutrition screening tool and intervene as necessary. Determine patient's food preferences and provide high-protein, high-caloric foods as appropriate.      INTERVENTIONS:  - Monitor oral intake, urinary output, labs, and treatment plans  - Assess nutrition and hydration status and recommend course of action  - Evaluate amount of meals eaten  - Assist patient with eating if necessary   - Allow adequate time for meals  - Recommend/ encourage appropriate diets, oral nutritional supplements, and vitamin/mineral supplements  - Order, calculate, and assess calorie counts as needed  - Recommend, monitor, and adjust tube feedings and TPN/PPN based on assessed needs  - Assess need for intravenous fluids  - Provide specific nutrition/hydration education as appropriate  - Include patient/family/caregiver in decisions related to nutrition  Outcome: Progressing

## 2023-10-07 NOTE — H&P
[address] [facility]  H&P  Name: Eli Wong 66 y.o. male I MRN: [id number]  Unit/Bed#: -Patric I Date of Admission: 10/6/2023   Date of Service: 10/6/2023 I Hospital Day: 0      Assessment/Plan   * Acute on chronic diastolic congestive heart failure [facility])  Assessment & Plan  Wt Readings from Last 3 Encounters:   10/06/23 (!) 159 kg (349 lb 12.8 oz)   10/05/23 (!) 158 kg (349 lb)   08/23/23 (!) 157 kg (346 lb)     · Presenting with worsening shortness of breath even at rest now. Reported increased lower extremity edema and at least 7 pound weight gain over the past 3-4 days despite dosing of metolazone as below  · S/p 40 mg IV Lasix during ED evaluation, give 3 mg IV Bumex now additionally  · Heart failure service consult for further diuretic recommendations, patient follows with Dr. Dyan Valenzuela. · Echocardiogram 4/11/2023 EF 10%, grade 2 diastolic dysfunction. Defer to heart failure service whether repeat echocardiogram is needed  · Continue remainder of cardiac medications  · Monitor daily weights, I/O, volume status  · Low-sodium, fluid strict diet  · Telemetry monitoring given high diuretic needs  · At least daily BMP while on IV diuresis, may need more frequently dependent on diuretic plan. Replace electrolytes as needed and monitor renal function        Nasal congestion  Assessment & Plan  · Patient reporting nasal congestion at this time without fevers additionally  · Follow-up result of COVID-19 PCR which has been ordered. Patient is on room air at this time, if positive start remdesivir and escalate as needed.   If negative supportive care for symptoms as needed    Severe persistent asthma without complication  Assessment & Plan  · Does not appear in acute exacerbation, continue home inhalers/nebulizers/Singulair    Stage 3a chronic kidney disease [facility])  Assessment & Plan  Lab Results   Component Value Date    EGFR 65 10/06/2023    EGFR 58 10/06/2023    EGFR 43 08/04/2023 CREATININE 1.23 10/06/2023    CREATININE 1.34 (H) 10/06/2023    CREATININE 1.73 (H) 08/04/2023   · Renal function improved from recent values  · Monitor with BMP given need for diuresis    Paroxysmal atrial fibrillation (HCC)  Assessment & Plan  · Rate controlled on metoprolol succinate, continue at home dosing  · Continue anticoagulation with Eliquis    VICKY (obstructive sleep apnea)  Assessment & Plan  · Not currently on CPAP as patient awaiting new device (current device recalled)  · Advised follow-up with prescribing physician    Morbid obesity due to excess calories ([address])  Assessment & Plan  · Dietary and lifestyle modification advised. Morbid obesity complicates all facets of care    Type 2 diabetes mellitus, without long-term current use of insulin (HCC)  Assessment & Plan  Lab Results   Component Value Date    HGBA1C 7.1 (A) 08/09/2023       No results for input(s): "POCGLU" in the last 72 hours. Blood Sugar Average: Last 72 hrs:  · Reasonably well controlled as outpatient  · Continue with Jardiance given heart failure, hold off on other oral medications. Start correctional insulin coverage with Accu-Cheks while admitted  · Carb controlled diet  · Initiate hypoglycemia protocol         VTE Prophylaxis: Apixaban (Eliquis)  / sequential compression device   Code Status: Full code  POLST: POLST form is not discussed and not completed at this time. Discussion with family: Patient declines at this time, states wife is aware of plan for admission/care plan. Anticipated Length of Stay:  Patient will be admitted on an Inpatient basis with an anticipated length of stay of greater than 2 midnights. Justification for Hospital Stay: Please see detailed plans noted above.     Chief Complaint:     Worsening shortness of breath  History of Present Illness:  Davion Corado is a 66 y.o. male who has a past medical history seen for chronic HFpEF 50% following with advanced heart failure cardiologist (Dr. Isaias Vail), paroxysmal atrial fibrillation anticoagulated on Eliquis, morbid obesity, severe persistent asthma, VICKY not currently on CPAP as device currently recalled, type 2 diabetes on oral medication, hypertension, chronic disease stage IIIa presenting with worsening shortness of breath. Patient states that for the past 3-4 days he has noted increasing shortness of breath initially with exertion, additionally reporting orthopnea, increasing lower extremity edema, and at least 7 pound weight gain. He additionally reports some nasal congestion that has developed during the span with some sore throat in the morning but denies any fever/chills, known sick contacts, worsening cough, or arthralgias/myalgias. He took a dose of metolazone 2.5 mg without improvement, and had follow-up with his cardiologist 10/5/2023 where he was started on spironolactone and advised to take 5 mg metolazone however he noted no improvement in symptoms. He initially was to receive outpatient dose of IV Lasix today, however this was deferred pending PCP follow-up to ensure there is not another etiology for the symptomatology, with patient advised to present to ED with any worsening. This afternoon/evening he noted shortness of breath even at rest which prompted presentation here. During ED evaluation concern noted for volume overload for which IV Lasix was provided and patient admitted for further management.     Review of Systems:    Constitutional:  Denies fever or chills but reports weight gain  Eyes:  Denies change in visual acuity   HENT:  Denies sore throat but reports nasal congestion  Respiratory:  Denies cough but reports shortness of breath  Cardiovascular:  Denies chest pain but reports edema  GI:  Denies abdominal pain, nausea, vomiting, bloody stools or diarrhea   :  Denies dysuria   Musculoskeletal:  Denies back pain or joint pain   Integument:  Denies rash   Neurologic:  Denies headache, focal weakness or sensory changes Endocrine:  Denies polyuria or polydipsia   Lymphatic:  Denies swollen glands   Psychiatric:  Denies depression or anxiety     Past Medical and Surgical History:   Past Medical History:   Diagnosis Date   • Acute gastritis without hemorrhage     last assessed 3/24/17   • Asthma    • Atrial fibrillation (HCC)    • Bilateral leg edema 02/19/2020   • CHF (congestive heart failure) (HCC)    • Coronary artery disease    • Daytime sleepiness 07/17/2019   • Diabetes mellitus ([address])    • Dyspnea on exertion 10/11/2021   • Edema of both feet 01/23/2020   • Gastric bypass status for obesity    • Hyperlipidemia    • Hypertension    • Hypokalemia 11/14/2021   • Impaired fasting glucose     last assessed 5/10/17   • Knee sprain, bilateral 06/14/2018   • Leg cramps 06/16/2022   • Obesity    • Viral gastroenteritis     last assessed 11/4/16     Past Surgical History:   Procedure Laterality Date   • CARDIAC CATHETERIZATION N/A 3/9/2022    Procedure: CARDIAC RHC W/ PRESSURE SENSOR;  Surgeon: Shara Gunderson MD;  Location: [facility]; Service: Cardiology   • CARDIAC CATHETERIZATION N/A 9/6/2022    Procedure: Cardiac catheterization;  Surgeon: Court Hawk DO;  Location: [facility]; Service: Cardiology   • CARDIAC CATHETERIZATION N/A 9/6/2022    Procedure: Cardiac Coronary Angiogram;  Surgeon: Court Hawk DO;  Location: [facility];   Service: Cardiology   • CARPAL TUNNEL RELEASE      bilateral   • GASTRIC BYPASS  2004    with han en y   • HERNIA REPAIR      ventral inscisional   • TONSILLECTOMY         Meds/Allergies:  Facility-Administered Medications Prior to Admission   Medication   • furosemide (LASIX) injection 100 mg     Medications Prior to Admission   Medication   • Accu-Chek FastClix Lancets MISC   • albuterol (PROVENTIL HFA,VENTOLIN HFA) 90 mcg/act inhaler   • apixaban (Eliquis) 5 mg   • atorvastatin (LIPITOR) 10 mg tablet   • benzonatate (TESSALON PERLES) 100 mg capsule   • Blood Glucose Monitoring Suppl (Accu-Chek Guide) w/Device KIT   • budesonide-formoterol (Symbicort) 160-4.5 mcg/act inhaler   • bumetanide (BUMEX) 1 mg tablet   • Empagliflozin (JARDIANCE) 10 MG TABS tablet   • EPINEPHrine (EPIPEN) 0.3 mg/0.3 mL SOAJ   • Fasenra Pen 30 MG/ML SOAJ   • levalbuterol (Xopenex) 1.25 mg/3 mL nebulizer solution   • metolazone (ZAROXOLYN) 2.5 mg tablet   • metoprolol succinate (TOPROL-XL) 50 mg 24 hr tablet   • montelukast (SINGULAIR) 10 mg tablet   • pantoprazole (PROTONIX) 40 mg tablet   • potassium chloride (K-DUR,KLOR-CON) 20 mEq tablet   • pregabalin (LYRICA) 50 mg capsule   • sitaGLIPtin (Januvia) 100 mg tablet   • Spiriva Respimat 1.25 MCG/ACT AERS inhaler   • spironolactone (ALDACTONE) 25 mg tablet       Allergies:    Allergies   Allergen Reactions   • Azithromycin Other (See Comments)     Shaky, uneasy feeling    • Bactrim [Sulfamethoxazole-Trimethoprim] Other (See Comments)     shakiness     History:  Marital Status: /Civil Union     Substance Use History:   Social History     Substance and Sexual Activity   Alcohol Use Yes   • Alcohol/week: 2.0 standard drinks of alcohol   • Types: 2 Shots of liquor per week    Comment: social     Social History     Tobacco Use   Smoking Status Former   • Types: Pipe, Cigars   • Start date:    • Quit date: 2021   • Years since quittin.7   Smokeless Tobacco Never   Tobacco Comments    cigar once a day     Social History     Substance and Sexual Activity   Drug Use No       Family History:  Family History   Problem Relation Age of Onset   • Stroke Mother    • Hypertension Father    • Cancer Father    • COPD Father        Physical Exam:     Vitals:   Blood Pressure: 140/78 (10/06/23 2241)  Pulse: 91 (10/06/23 2241)  Temperature: 99.9 °F (37.7 °C) (10/06/23 2241)  Temp Source: Oral (10/06/23 1733)  Respirations: 16 (10/06/23 2100)  Height: 6' 1" (185.4 cm) (10/06/23 2237)  Weight - Scale: (!) 159 kg (349 lb 12.8 oz) (10/06/23 2237)  SpO2: 95 % (10/06/23 2241)    Constitutional:  Well developed, morbidly obese, no acute distress, non-toxic appearance   Eyes:  PERRL, conjunctiva normal   HENT:  Atraumatic, external ears normal, nose normal, oropharynx moist, no pharyngeal exudates. Neck- normal range of motion, no tenderness, supple, +JVD   Respiratory:  No respiratory distress, diminished breath sounds bilaterally, no rales, no wheezing   Cardiovascular:  Normal rate, normal rhythm, no murmurs, no gallops, no rubs   GI:  Soft, nondistended, normal bowel sounds, nontender, no organomegaly, no mass, no rebound, no guarding   :  No costovertebral angle tenderness   Musculoskeletal: 2+ bilateral lower extremity pitting edema, no tenderness, no deformities. Back- no tenderness  Integument:  Well hydrated, no rash   Lymphatic:  No lymphadenopathy noted   Neurologic:  Alert &awake, communicative, CN 2-12 normal, normal motor function, normal sensory function, no focal deficits noted   Psychiatric:  Speech and behavior appropriate       Lab Results: I have personally reviewed pertinent reports. Results from last 7 days   Lab Units 10/06/23  1803   WBC Thousand/uL 9.86   HEMOGLOBIN g/dL 10.3*   HEMATOCRIT % 33.1*   PLATELETS Thousands/uL 290   NEUTROS PCT % 60   LYMPHS PCT % 14   MONOS PCT % 8   EOS PCT % 16*     Results from last 7 days   Lab Units 10/06/23  1803   POTASSIUM mmol/L 3.6   CHLORIDE mmol/L 101   CO2 mmol/L 25   BUN mg/dL 18   CREATININE mg/dL 1.23   CALCIUM mg/dL 7.8*   ALK PHOS U/L 105*   ALT U/L 69*   AST U/L 52*           EKG: Normal sinus rhythm HR 81    Imaging: I have personally reviewed pertinent films in PACS    CXR 10/6/2023: Personally reviewed, appears pulmonary vascular congestion. Final radiologist read is pending. ** Please Note: Dragon 360 Dictation voice to text software was used in the creation of this document.  **

## 2023-10-08 DIAGNOSIS — J45.20 MILD INTERMITTENT ASTHMA WITHOUT COMPLICATION: ICD-10-CM

## 2023-10-08 LAB
ANION GAP SERPL CALCULATED.3IONS-SCNC: 11 MMOL/L
BUN SERPL-MCNC: 26 MG/DL (ref 5–25)
CALCIUM SERPL-MCNC: 8.2 MG/DL (ref 8.4–10.2)
CHLORIDE SERPL-SCNC: 95 MMOL/L (ref 96–108)
CO2 SERPL-SCNC: 31 MMOL/L (ref 21–32)
CREAT SERPL-MCNC: 1.75 MG/DL (ref 0.6–1.3)
GFR SERPL CREATININE-BSD FRML MDRD: 42 ML/MIN/1.73SQ M
GLUCOSE SERPL-MCNC: 175 MG/DL (ref 65–140)
GLUCOSE SERPL-MCNC: 184 MG/DL (ref 65–140)
GLUCOSE SERPL-MCNC: 196 MG/DL (ref 65–140)
GLUCOSE SERPL-MCNC: 228 MG/DL (ref 65–140)
GLUCOSE SERPL-MCNC: 234 MG/DL (ref 65–140)
MAGNESIUM SERPL-MCNC: 2.1 MG/DL (ref 1.9–2.7)
POTASSIUM SERPL-SCNC: 3.2 MMOL/L (ref 3.5–5.3)
SODIUM SERPL-SCNC: 137 MMOL/L (ref 135–147)

## 2023-10-08 PROCEDURE — 82948 REAGENT STRIP/BLOOD GLUCOSE: CPT

## 2023-10-08 PROCEDURE — 99232 SBSQ HOSP IP/OBS MODERATE 35: CPT | Performed by: NURSE PRACTITIONER

## 2023-10-08 PROCEDURE — 83735 ASSAY OF MAGNESIUM: CPT | Performed by: FAMILY MEDICINE

## 2023-10-08 PROCEDURE — 99232 SBSQ HOSP IP/OBS MODERATE 35: CPT | Performed by: INTERNAL MEDICINE

## 2023-10-08 PROCEDURE — 80048 BASIC METABOLIC PNL TOTAL CA: CPT | Performed by: FAMILY MEDICINE

## 2023-10-08 RX ORDER — POTASSIUM CHLORIDE 20 MEQ/1
40 TABLET, EXTENDED RELEASE ORAL ONCE
Status: COMPLETED | OUTPATIENT
Start: 2023-10-08 | End: 2023-10-08

## 2023-10-08 RX ORDER — ALBUTEROL SULFATE 90 UG/1
AEROSOL, METERED RESPIRATORY (INHALATION)
Qty: 6.7 G | Refills: 0 | Status: SHIPPED | OUTPATIENT
Start: 2023-10-08 | End: 2023-10-12

## 2023-10-08 RX ORDER — BUMETANIDE 0.25 MG/ML
5 INJECTION INTRAMUSCULAR; INTRAVENOUS 2 TIMES DAILY
Status: DISCONTINUED | OUTPATIENT
Start: 2023-10-08 | End: 2023-10-11

## 2023-10-08 RX ADMIN — EMPAGLIFLOZIN 10 MG: 10 TABLET, FILM COATED ORAL at 10:05

## 2023-10-08 RX ADMIN — PREGABALIN 50 MG: 50 CAPSULE ORAL at 21:17

## 2023-10-08 RX ADMIN — APIXABAN 5 MG: 5 TABLET, FILM COATED ORAL at 17:05

## 2023-10-08 RX ADMIN — Medication 6 MG: at 21:17

## 2023-10-08 RX ADMIN — MONTELUKAST 10 MG: 10 TABLET, FILM COATED ORAL at 21:17

## 2023-10-08 RX ADMIN — BUMETANIDE 5 MG: 0.25 INJECTION INTRAMUSCULAR; INTRAVENOUS at 10:05

## 2023-10-08 RX ADMIN — INSULIN LISPRO 2 UNITS: 100 INJECTION, SOLUTION INTRAVENOUS; SUBCUTANEOUS at 10:11

## 2023-10-08 RX ADMIN — BUDESONIDE AND FORMOTEROL FUMARATE DIHYDRATE 2 PUFF: 160; 4.5 AEROSOL RESPIRATORY (INHALATION) at 17:05

## 2023-10-08 RX ADMIN — INSULIN LISPRO 2 UNITS: 100 INJECTION, SOLUTION INTRAVENOUS; SUBCUTANEOUS at 17:05

## 2023-10-08 RX ADMIN — INSULIN LISPRO 4 UNITS: 100 INJECTION, SOLUTION INTRAVENOUS; SUBCUTANEOUS at 13:12

## 2023-10-08 RX ADMIN — BUMETANIDE 5 MG: 0.25 INJECTION INTRAMUSCULAR; INTRAVENOUS at 17:05

## 2023-10-08 RX ADMIN — UMECLIDINIUM 1 PUFF: 62.5 AEROSOL, POWDER ORAL at 10:05

## 2023-10-08 RX ADMIN — PANTOPRAZOLE SODIUM 40 MG: 40 TABLET, DELAYED RELEASE ORAL at 05:19

## 2023-10-08 RX ADMIN — POTASSIUM CHLORIDE 40 MEQ: 1500 TABLET, EXTENDED RELEASE ORAL at 10:06

## 2023-10-08 RX ADMIN — METOPROLOL SUCCINATE 50 MG: 50 TABLET, EXTENDED RELEASE ORAL at 10:05

## 2023-10-08 RX ADMIN — BUDESONIDE AND FORMOTEROL FUMARATE DIHYDRATE 2 PUFF: 160; 4.5 AEROSOL RESPIRATORY (INHALATION) at 10:05

## 2023-10-08 RX ADMIN — SPIRONOLACTONE 25 MG: 25 TABLET ORAL at 10:06

## 2023-10-08 RX ADMIN — INSULIN LISPRO 2 UNITS: 100 INJECTION, SOLUTION INTRAVENOUS; SUBCUTANEOUS at 21:17

## 2023-10-08 RX ADMIN — ATORVASTATIN CALCIUM 10 MG: 10 TABLET, FILM COATED ORAL at 10:07

## 2023-10-08 RX ADMIN — APIXABAN 5 MG: 5 TABLET, FILM COATED ORAL at 10:06

## 2023-10-08 NOTE — PLAN OF CARE
Problem: CARDIOVASCULAR - ADULT  Goal: Maintains optimal cardiac output and hemodynamic stability  Description: INTERVENTIONS:  - Monitor I/O, vital signs and rhythm  - Monitor for S/S and trends of decreased cardiac output  - Administer and titrate ordered vasoactive medications to optimize hemodynamic stability  - Assess quality of pulses, skin color and temperature  - Assess for signs of decreased coronary artery perfusion  - Instruct patient to report change in severity of symptoms  Outcome: Progressing  Goal: Absence of cardiac dysrhythmias or at baseline rhythm  Description: INTERVENTIONS:  - Continuous cardiac monitoring, vital signs, obtain 12 lead EKG if ordered  - Administer antiarrhythmic and heart rate control medications as ordered  - Monitor electrolytes and administer replacement therapy as ordered  Outcome: Progressing     Problem: Nutrition/Hydration-ADULT  Goal: Nutrient/Hydration intake appropriate for improving, restoring or maintaining nutritional needs  Description: Monitor and assess patient's nutrition/hydration status for malnutrition. Collaborate with interdisciplinary team and initiate plan and interventions as ordered. Monitor patient's weight and dietary intake as ordered or per policy. Utilize nutrition screening tool and intervene as necessary. Determine patient's food preferences and provide high-protein, high-caloric foods as appropriate.      INTERVENTIONS:  - Monitor oral intake, urinary output, labs, and treatment plans  - Assess nutrition and hydration status and recommend course of action  - Evaluate amount of meals eaten  - Assist patient with eating if necessary   - Allow adequate time for meals  - Recommend/ encourage appropriate diets, oral nutritional supplements, and vitamin/mineral supplements  - Order, calculate, and assess calorie counts as needed  - Recommend, monitor, and adjust tube feedings and TPN/PPN based on assessed needs  - Assess need for intravenous fluids  - Provide specific nutrition/hydration education as appropriate  - Include patient/family/caregiver in decisions related to nutrition  Outcome: Progressing     Problem: METABOLIC, FLUID AND ELECTROLYTES - ADULT  Goal: Electrolytes maintained within normal limits  Description: INTERVENTIONS:  - Monitor labs and assess patient for signs and symptoms of electrolyte imbalances  - Administer electrolyte replacement as ordered  - Monitor response to electrolyte replacements, including repeat lab results as appropriate  - Instruct patient on fluid and nutrition as appropriate  Outcome: Progressing  Goal: Fluid balance maintained  Description: INTERVENTIONS:  - Monitor labs   - Monitor I/O and WT  - Instruct patient on fluid and nutrition as appropriate  - Assess for signs & symptoms of volume excess or deficit  Outcome: Progressing

## 2023-10-08 NOTE — PROGRESS NOTES
[facility]  Progress Note  Name: Danielle Mccartney  MRN: [id number]  Unit/Bed#: -46 I Date of Admission: 10/6/2023   Date of Service: 10/8/2023 I Hospital Day: 2    Assessment/Plan   * Acute on chronic diastolic congestive heart failure [facility])  Assessment & Plan  Wt Readings from Last 3 Encounters:   10/08/23 (!) 156 kg (344 lb 12.8 oz)   10/05/23 (!) 158 kg (349 lb)   08/23/23 (!) 157 kg (346 lb)     · Presenting with worsening shortness of breath even at rest now. Reported increased lower extremity edema and at least 7 pound weight gain over the past 3-4 days despite dosing of metolazone as below  · S/p 40 mg IV Lasix during ED evaluation, give 3 mg IV Bumex now additionally  · Heart failure service consult for further diuretic recommendations, patient follows with Dr. Jaimee Cuello. · Echocardiogram 4/11/2023 EF 03%, grade 2 diastolic dysfunction. Defer to heart failure service whether repeat echocardiogram is needed  · Continue remainder of cardiac medications  · Monitor daily weights, I/O, volume status  · Low-sodium, fluid strict diet  · Telemetry monitoring given high diuretic needs  · At least daily BMP while on IV diuresis, may need more frequently dependent on diuretic plan. Replace electrolytes as needed and monitor renal function  · Cardiology consultation appreciated. · On Bumex 5 mg 3 times daily transitioned to 5mg iv BID 10/8  · Monitor creatinine. Slight bump today        Nasal congestion  Assessment & Plan  · Patient reporting nasal congestion at this time without fevers additionally  · Follow-up result of COVID-19 PCR which has been ordered. Patient is on room air at this time, if positive start remdesivir and escalate as needed.   If negative supportive care for symptoms as needed  · Can write for saline nasal spray as needed    Severe persistent asthma without complication  Assessment & Plan  · Does not appear in acute exacerbation, continue home inhalers/nebulizers/Singulair    Stage 3a chronic kidney disease [facility])  Assessment & Plan  Lab Results   Component Value Date    EGFR 42 10/08/2023    EGFR 54 10/07/2023    EGFR 65 10/06/2023    CREATININE 1.75 (H) 10/08/2023    CREATININE 1.43 (H) 10/07/2023    CREATININE 1.23 10/06/2023   · Renal function improved from recent values  · Monitor with BMP given need for diuresis  · Creatinine did bump up slightly today so continue to monitor closely. Paroxysmal atrial fibrillation (HCC)  Assessment & Plan  · Rate controlled on metoprolol succinate, continue at home dosing  · Continue anticoagulation with Eliquis    VICKY (obstructive sleep apnea)  Assessment & Plan  · Not currently on CPAP as patient awaiting new device (current device recalled)  · Advised follow-up with prescribing physician    Morbid obesity due to excess calories ([address])  Assessment & Plan  · Dietary and lifestyle modification advised. Morbid obesity complicates all facets of care    Type 2 diabetes mellitus, without long-term current use of insulin [facility])  Assessment & Plan  Lab Results   Component Value Date    HGBA1C 7.1 (A) 08/09/2023       Recent Labs     10/07/23  1638 10/07/23  2049 10/08/23  0708 10/08/23  1115   POCGLU 167* 228* 175* 234*       Blood Sugar Average: Last 72 hrs:  · (P) [id number] well controlled as outpatient  · Continue with Jardiance given heart failure, hold off on other oral medications. Start correctional insulin coverage with Accu-Cheks while admitted  · Carb controlled diet  · Initiate hypoglycemia protocol           VTE Pharmacologic Prophylaxis: VTE Score: 6 High Risk (Score >/= 5) - Pharmacological DVT Prophylaxis Ordered: apixaban (Eliquis). Sequential Compression Devices Ordered. Patient Centered Rounds: I performed bedside rounds with nursing staff today.   Discussions with Specialists or Other Care Team Provider: nursing    Education and Discussions with Family / Patient: Patient declined call to . pt reports he is updating the family     Total Time Spent on Date of Encounter in care of patient: 25 mins. This time was spent on one or more of the following: performing physical exam; counseling and coordination of care; obtaining or reviewing history; documenting in the medical record; reviewing/ordering tests, medications or procedures; communicating with other healthcare professionals and discussing with patient's family/caregivers. Current Length of Stay: 2 day(s)  Current Patient Status: Inpatient   Certification Statement: The patient will continue to require additional inpatient hospital stay due to ongoing need for iv diuresis   Discharge Plan: Anticipate discharge in 24-48 hrs to home. Code Status: Level 1 - Full Code    Subjective:   Pt reports that the swelling in his legs and abdomen are a little better but he still feels like he has a fair amt of fluid. He is coughing does sound nasally. no pain     Objective:     Vitals:   Temp (24hrs), Av.9 °F (37.2 °C), Min:98 °F (36.7 °C), Max:100.2 °F (37.9 °C)    Temp:  [98 °F (36.7 °C)-100.2 °F (37.9 °C)] 98.3 °F (36.8 °C)  HR:  [76-90] 80  Resp:  [16] 16  BP: (119-142)/(61-77) 119/76  SpO2:  [94 %-97 %] 95 %  Body mass index is 45.49 kg/m². Input and Output Summary (last 24 hours): Intake/Output Summary (Last 24 hours) at 10/8/2023 1429  Last data filed at 10/8/2023 1300  Gross per 24 hour   Intake 1080 ml   Output 3725 ml   Net -2645 ml       Physical Exam:   Physical Exam  Constitutional:       General: He is not in acute distress. Appearance: He is obese. He is not ill-appearing, toxic-appearing or diaphoretic. HENT:      Head: Normocephalic and atraumatic. Nose: No congestion. Mouth/Throat:      Pharynx: Oropharynx is clear. No oropharyngeal exudate. Eyes:      General: No scleral icterus. Right eye: No discharge. Left eye: No discharge.       Conjunctiva/sclera: Conjunctivae normal.   Cardiovascular:      Rate and Rhythm: Normal rate. Heart sounds: No murmur heard. No friction rub. No gallop. Pulmonary:      Effort: No respiratory distress. Breath sounds: No stridor. No wheezing, rhonchi or rales. Comments: Nasal congestion and cough no mucus   Chest:      Chest wall: No tenderness. Abdominal:      General: There is no distension. Palpations: There is no mass. Tenderness: There is no abdominal tenderness. There is no guarding or rebound. Hernia: No hernia is present. Musculoskeletal:         General: No swelling, tenderness, deformity or signs of injury. Right lower leg: Edema present. Left lower leg: Edema present. Skin:     Coloration: Skin is not jaundiced or pale. Findings: No bruising, erythema, lesion or rash. Neurological:      Mental Status: He is alert and oriented to person, place, and time. Psychiatric:         Behavior: Behavior normal.          Additional Data:     Labs:  Results from last 7 days   Lab Units 10/07/23  0544 10/06/23  1803   WBC Thousand/uL 8.09 9.86   HEMOGLOBIN g/dL 9.8* 10.3*   HEMATOCRIT % 32.3* 33.1*   PLATELETS Thousands/uL 249 290   NEUTROS PCT %  --  60   LYMPHS PCT %  --  14   MONOS PCT %  --  8   EOS PCT %  --  16*     Results from last 7 days   Lab Units 10/08/23  0528 10/07/23  0544 10/06/23  1803   SODIUM mmol/L 137   < > 137   POTASSIUM mmol/L 3.2*   < > 3.6   CHLORIDE mmol/L 95*   < > 101   CO2 mmol/L 31   < > 25   BUN mg/dL 26*   < > 18   CREATININE mg/dL 1.75*   < > 1.23   ANION GAP mmol/L 11   < > 11   CALCIUM mg/dL 8.2*   < > 7.8*   ALBUMIN g/dL  --   --  3.6   TOTAL BILIRUBIN mg/dL  --   --  0.48   ALK PHOS U/L  --   --  105*   ALT U/L  --   --  69*   AST U/L  --   --  52*   GLUCOSE RANDOM mg/dL 184*   < > 244*    < > = values in this interval not displayed.          Results from last 7 days   Lab Units 10/08/23  1115 10/08/23  0708 10/07/23  2049 10/07/23  1638 10/07/23  1200 10/07/23  0549 10/06/23  2348   POC GLUCOSE mg/dl 234* 175* 228* 167* 210* 158* 188*               Lines/Drains:  Invasive Devices     Peripheral Intravenous Line  Duration           Peripheral IV 10/06/23 Left Antecubital 1 day                  Telemetry:  Telemetry Orders (From admission, onward)             24 Hour Telemetry Monitoring  Continuous x 24 Hours (Telem)        Question:  Reason for 24 Hour Telemetry  Answer:  Decompensated CHF- and any one of the following: continuous diuretic infusion or total diuretic dose >200 mg daily, associated electrolyte derangement (I.e. K < 3.0), ionotropic drip (continuous infusion), hx of ventricular arrhythmia, or new EF < 35%                 Telemetry Reviewed: Normal Sinus Rhythm  Indication for Continued Telemetry Use: Arrthymias requiring medical therapy             Imaging: Reviewed radiology reports from this admission including: chest xray    Recent Cultures (last 7 days):         Last 24 Hours Medication List:   Current Facility-Administered Medications   Medication Dose Route Frequency Provider Last Rate   • albuterol  2 puff Inhalation Q4H PRN Darius Sing, DO     • apixaban  5 mg Oral BID Darius Sing, DO     • atorvastatin  10 mg Oral Daily Darius Sing, DO     • budesonide-formoterol  2 puff Inhalation BID Darius Sing, DO     • bumetanide  5 mg Intravenous BID Lauren Alexei, DO     • Empagliflozin  10 mg Oral Daily Darius Sing, DO     • insulin lispro  1-6 Units Subcutaneous HS Darius Sing, DO     • insulin lispro  2-12 Units Subcutaneous TID AC Darius Sing, DO     • melatonin  6 mg Oral HS Joe Antunez Mc, MD     • metoprolol succinate  50 mg Oral Daily Darius Sing, DO     • montelukast  10 mg Oral HS Darius Sing, DO     • pantoprazole  40 mg Oral Daily Darius Sing, DO     • pregabalin  50 mg Oral HS Darius Sing, DO     • spironolactone  25 mg Oral Daily Darius Sing, DO     • umeclidinium  1 puff Inhalation Daily Joe Altamirano MD          Today, Patient Was Seen By: RODRIGUE White    **Please Note: This note may have been constructed using a voice recognition system. **

## 2023-10-08 NOTE — PROGRESS NOTES
Heart Failure/ Pulmonary Hypertension Progress Note - Geeta Flaherty 66 y.o. male MRN: [id number]    Unit/Bed#: -01 Encounter: [id number]      Assessment:    Principal Problem:    Acute on chronic diastolic congestive heart failure (HCC)  Active Problems:    Type 2 diabetes mellitus, without long-term current use of insulin (HCC)    Morbid obesity due to excess calories (HCC)    VICKY (obstructive sleep apnea)    Paroxysmal atrial fibrillation (HCC)    Stage 3a chronic kidney disease (HCC)    Severe persistent asthma without complication    Nasal congestion      Interim:  Ins/outs: 1140/3400: -2260 with bumex 5 mg IV TID  Weight: 344 lbs (349 lbs)  MAPs: 82-97 mmHg  Oxygen: room air  Cr up to 1.75, K 3.2      # Acute on Chronic HFpEF- HFimpEF, Stage C, NYHA III     Volume up on exam today, recent CardioMems PAD 18-19mmHg, now up to mid 20's   Diuretic: recent increase to Bumex 3 mg BID w/ K 40 meq BID   Weight:  344 lbs --349 lbs (of note he was 314 lbs on an admit in 2022)  BNP: 8/4/23: 122  NT proBNP: 7/29/22: 903   609 4/8/22     cardiomems in place, last read June Studies- personally reviewed by me    Echo 4/11/23:  LVEF: 50%  Grade 2 DD  RV: normal    LHC 9/6/22: No obstructive CAD     Pharm Nuc Stress 9/2/22: Abnormal study due to the presence of an inferior and inferolateral infarct without significant ischemia    RHC 3/9/22:    RA 4    RV 35/4    PA 35/12/21    PCWP 10    PA sat 64%    Travis CO 5.56 L/min    Travis CI 2.2L/min/m2    PVR 1.97 SAGASTUME        TTE 11/12/2021:   LVEF 55%. LVIDd 6.0 cm. Grade 1 DD. Normal RV. Trace TR.   TTE 07/19/2021:   LVEF 50%. LVIDd 6.13 cm. Grade 1 DD. Normal RV. Trace MR and TR. Mildly dilated IVC.     TTE 03/25/2020:   LVEF 40-45%. LVIDd 6.12 cm.  Normal RV.         Neurohormonal Blockade:    --Beta Blocker: metoprolol succinate 50 mg daily.     --ARNi / ACEi / ARB: No.     --Aldosterone Antagonist: spironolactone 25 mg daily    --SGLT2 Inhibitor: Jardiance 10 mg --Diuretic: Bumex 5 mg BID, K 40 meq BID, prn metolazone  Inpt: Bumex 5 mg IV TID      Sudden Cardiac Death Risk Reduction:    --LVEF >35%.          Electrical Resynchronization:    --Candidacy for BiV device: narrow QRS.          Advanced Therapies:  Will continue to monitor.  LVEF recovered        # Atrial fibrillation    [id number] = 3 (HF, HTN, DM).     Diagnosed 02/2022.    Anticoagulation on Eliquis.     Rate control: metoprolol 50 mg daily  Rhythm control: No.        # Hypertension, mostly controlled, amlodipine 5 mg, metoprolol      # Hyperlipidemia - atorvastatin 10 mg   10/6/23: LDL 86, HDL 55       # Diabetes mellitus, type II      Non-insulin dependent.    HbA1c 6/16/22: 5.7%        # Obstructive sleep apnea   # Chronic respiratory failure    # Asthma, moderate persistent     # S/p gastric bypass (Samuel-en-Y)surgery     # History of tobacco abuse    # Carpal tunnel syndrome, bilateral     # CKD, Cr 1.43  # s/p CardioMems implant 3/9/22    PAD 25 mmHg- most recently - which is up for him         Plan:  Decrease bumex to BID, replace K  cardiomems readings apparently not up much  Has gained non fluid weight as well  BP al ittle high, will trend with diuresis    HPI:    65 yo male with HFimpEF with predominant HFpEF now with multiple admits for volume overload. He is on high dose bumex 5 mg BID as outpt with prn metolazone. His morbid obesity complicates his treatment with BMI 46. Weight is up to 349 lbs (hospital records show weight around 314 lbs during an admit in 2022). He feels more SOB and more edema in legs - metolazone not of help as outpt    Review of Systems   Constitutional: Positive for unexpected weight change. Negative for activity change, appetite change and fatigue. HENT: Negative for congestion and nosebleeds. Eyes: Negative. Respiratory: Positive for shortness of breath. Negative for cough and chest tightness. Cardiovascular: Positive for leg swelling.  Negative for chest pain and palpitations. Gastrointestinal: Negative for abdominal distention. Endocrine: Negative. Genitourinary: Negative. Musculoskeletal: Negative. Skin: Negative. Neurological: Negative for dizziness, syncope and weakness. Hematological: Negative. Psychiatric/Behavioral: Negative. [facility] (day, reason): Cotton catheter (day, reason):    Vitals: Blood pressure 121/61, pulse 78, temperature 98 °F (36.7 °C), temperature source Axillary, resp. rate 16, height 6' 1" (1.854 m), weight (!) 156 kg (344 lb 12.8 oz), SpO2 95 %. , Body mass index is 45.49 kg/m²., I/O last 3 completed shifts: In: 1140 [P.O.:1140]  Out: 4200 [Urine:4200]  No intake/output data recorded. Wt Readings from Last 3 Encounters:   10/08/23 (!) 156 kg (344 lb 12.8 oz)   10/05/23 (!) 158 kg (349 lb)   08/23/23 (!) 157 kg (346 lb)       Intake/Output Summary (Last 24 hours) at 10/8/2023 0857  Last data filed at 10/8/2023 0519  Gross per 24 hour   Intake 1140 ml   Output 3400 ml   Net -2260 ml     I/O last 3 completed shifts: In: 1140 [P.O.:1140]  Out: 4200 [Urine:4200]    No significant arrhythmias seen on telemetry review.        Physical Exam:  Vitals:    10/07/23 2225 10/08/23 0226 10/08/23 0530 10/08/23 0708   BP: 136/76 123/61  121/61   BP Location:    Right arm   Pulse: 90 76  78   Resp:    16   Temp: 100.2 °F (37.9 °C) 98 °F (36.7 °C)  98 °F (36.7 °C)   TempSrc:    Axillary   SpO2: 97% 95%  95%   Weight:   (!) 156 kg (344 lb 12.8 oz)    Height:           GEN: Steffi Montgomery appears well, alert and oriented x 3, pleasant and cooperative   HEENT: pupils equal, round, and reactive to light; extraocular muscles intact  NECK: supple, no carotid bruits   HEART: regular rhythm, normal S1 and S2, no murmurs, clicks, gallops or rubs, JVP is    LUNGS: clear to auscultation bilaterally; no wheezes, rales, or rhonchi   ABDOMEN: normal bowel sounds, soft, no tenderness, no distention  EXTREMITIES: peripheral pulses normal; no clubbing, cyanosis, or edema  NEURO: no focal findings   SKIN: normal without suspicious lesions on exposed skin      Current Facility-Administered Medications:   •  albuterol (PROVENTIL HFA,VENTOLIN HFA) inhaler 2 puff, 2 puff, Inhalation, Q4H PRN, Katelynn Sorto DO  •  apixaban (ELIQUIS) tablet 5 mg, 5 mg, Oral, BID, Katelynn Anthonyi, DO, 5 mg at 10/07/23 1824  •  atorvastatin (LIPITOR) tablet 10 mg, 10 mg, Oral, Daily, Katelynn Anthonyi, DO, 10 mg at 10/07/23 1000  •  budesonide-formoterol (SYMBICORT) 160-4.5 mcg/act inhaler 2 puff, 2 puff, Inhalation, BID, Katelynn Sorto, DO, 2 puff at 10/07/23 1824  •  bumetanide (BUMEX) injection 5 mg, 5 mg, Intravenous, TID, Katelynn Sorto DO, 5 mg at 10/07/23 2057  •  Empagliflozin (JARDIANCE) tablet 10 mg, 10 mg, Oral, Daily, Katelynn Anthonyi, DO, 10 mg at 10/07/23 1000  •  insulin lispro (HumaLOG) 100 units/mL subcutaneous injection 1-6 Units, 1-6 Units, Subcutaneous, HS, Katelynn Sorto DO, 2 Units at 10/07/23 2101  •  insulin lispro (HumaLOG) 100 units/mL subcutaneous injection 2-12 Units, 2-12 Units, Subcutaneous, TID AC, 2 Units at 10/07/23 1823 **AND** Fingerstick Glucose (POCT), , , TID AC, Katelynn Sorto DO  •  melatonin tablet 6 mg, 6 mg, Oral, HS, Joe Kong MD, 6 mg at 10/07/23 2100  •  metoprolol succinate (TOPROL-XL) 24 hr tablet 50 mg, 50 mg, Oral, Daily, Katelynn Sorto, DO, 50 mg at 10/07/23 1000  •  montelukast (SINGULAIR) tablet 10 mg, 10 mg, Oral, HS, Katelynn Sorto, DO, 10 mg at 10/07/23 2100  •  pantoprazole (PROTONIX) EC tablet 40 mg, 40 mg, Oral, Daily, Katelynn Sorto DO, 40 mg at 10/08/23 9428  •  pregabalin (LYRICA) capsule 50 mg, 50 mg, Oral, HS, Katelynn Sorto DO, 50 mg at 10/07/23 2100  •  spironolactone (ALDACTONE) tablet 25 mg, 25 mg, Oral, Daily, Katelynn Sorto DO, 25 mg at 10/07/23 1000  •  umeclidinium 62.5 mcg/actuation inhaler AEPB 1 puff, 1 puff, Inhalation, Daily, Joe Kong MD      Labs & Results:        Results from last 7 days   Lab Units 10/07/23  0544 10/06/23  1803   WBC Thousand/uL 8.09 9.86   HEMOGLOBIN g/dL 9.8* 10.3*   HEMATOCRIT % 32.3* 33.1*   PLATELETS Thousands/uL 249 290         Results from last 7 days   Lab Units 10/08/23  0528 10/07/23  0544 10/06/23  1803   POTASSIUM mmol/L 3.2* 3.8 3.6   CHLORIDE mmol/L 95* 98 101   CO2 mmol/L 31 27 25   BUN mg/dL 26* 21 18   CREATININE mg/dL 1.75* 1.43* 1.23   CALCIUM mg/dL 8.2* 8.0* 7.8*   ALK PHOS U/L  --   --  105*   ALT U/L  --   --  69*   AST U/L  --   --  52*           Counseling / Coordination of Care  Total floor / unit time spent today 40 minutes. Greater than 50% of total time was spent with the patient and / or family counseling and / or coordination of care. A description of the counseling / coordination of care: 25. Thank you for the opportunity to participate in the care of this patient.   Leny Gonzales PULMONARY HYPERTENSION  MEDICAL DIRECTOR OF [address] [place] [address]

## 2023-10-09 LAB
ALBUMIN SERPL BCP-MCNC: 3.6 G/DL (ref 3.5–5)
ALP SERPL-CCNC: 90 U/L (ref 34–104)
ALT SERPL W P-5'-P-CCNC: 88 U/L (ref 7–52)
ANION GAP SERPL CALCULATED.3IONS-SCNC: 12 MMOL/L
AST SERPL W P-5'-P-CCNC: 170 U/L (ref 13–39)
ATRIAL RATE: 81 BPM
BASOPHILS # BLD AUTO: 0.1 THOUSANDS/ÂΜL (ref 0–0.1)
BASOPHILS NFR BLD AUTO: 1 % (ref 0–1)
BILIRUB SERPL-MCNC: 1.04 MG/DL (ref 0.2–1)
BUN SERPL-MCNC: 29 MG/DL (ref 5–25)
CALCIUM SERPL-MCNC: 8.2 MG/DL (ref 8.4–10.2)
CHLORIDE SERPL-SCNC: 94 MMOL/L (ref 96–108)
CO2 SERPL-SCNC: 31 MMOL/L (ref 21–32)
CREAT SERPL-MCNC: 1.91 MG/DL (ref 0.6–1.3)
EOSINOPHIL # BLD AUTO: 1.37 THOUSAND/ÂΜL (ref 0–0.61)
EOSINOPHIL NFR BLD AUTO: 18 % (ref 0–6)
ERYTHROCYTE [DISTWIDTH] IN BLOOD BY AUTOMATED COUNT: 17.1 % (ref 11.6–15.1)
GFR SERPL CREATININE-BSD FRML MDRD: 38 ML/MIN/1.73SQ M
GLUCOSE SERPL-MCNC: 180 MG/DL (ref 65–140)
GLUCOSE SERPL-MCNC: 185 MG/DL (ref 65–140)
GLUCOSE SERPL-MCNC: 224 MG/DL (ref 65–140)
GLUCOSE SERPL-MCNC: 232 MG/DL (ref 65–140)
GLUCOSE SERPL-MCNC: 234 MG/DL (ref 65–140)
HCT VFR BLD AUTO: 32.7 % (ref 36.5–49.3)
HGB BLD-MCNC: 10.1 G/DL (ref 12–17)
IMM GRANULOCYTES # BLD AUTO: 0.04 THOUSAND/UL (ref 0–0.2)
IMM GRANULOCYTES NFR BLD AUTO: 1 % (ref 0–2)
LYMPHOCYTES # BLD AUTO: 0.99 THOUSANDS/ÂΜL (ref 0.6–4.47)
LYMPHOCYTES NFR BLD AUTO: 13 % (ref 14–44)
MCH RBC QN AUTO: 25.1 PG (ref 26.8–34.3)
MCHC RBC AUTO-ENTMCNC: 30.9 G/DL (ref 31.4–37.4)
MCV RBC AUTO: 81 FL (ref 82–98)
MONOCYTES # BLD AUTO: 0.62 THOUSAND/ÂΜL (ref 0.17–1.22)
MONOCYTES NFR BLD AUTO: 8 % (ref 4–12)
NEUTROPHILS # BLD AUTO: 4.61 THOUSANDS/ÂΜL (ref 1.85–7.62)
NEUTS SEG NFR BLD AUTO: 59 % (ref 43–75)
NRBC BLD AUTO-RTO: 0 /100 WBCS
P AXIS: 67 DEGREES
PLATELET # BLD AUTO: 257 THOUSANDS/UL (ref 149–390)
PMV BLD AUTO: 9.9 FL (ref 8.9–12.7)
POTASSIUM SERPL-SCNC: 3.3 MMOL/L (ref 3.5–5.3)
PR INTERVAL: 140 MS
PROT SERPL-MCNC: 6.5 G/DL (ref 6.4–8.4)
QRS AXIS: 64 DEGREES
QRSD INTERVAL: 84 MS
QT INTERVAL: 380 MS
QTC INTERVAL: 441 MS
RBC # BLD AUTO: 4.02 MILLION/UL (ref 3.88–5.62)
SODIUM SERPL-SCNC: 137 MMOL/L (ref 135–147)
T WAVE AXIS: 62 DEGREES
VENTRICULAR RATE: 81 BPM
WBC # BLD AUTO: 7.73 THOUSAND/UL (ref 4.31–10.16)

## 2023-10-09 PROCEDURE — 85025 COMPLETE CBC W/AUTO DIFF WBC: CPT | Performed by: NURSE PRACTITIONER

## 2023-10-09 PROCEDURE — NC001 PR NO CHARGE: Performed by: STUDENT IN AN ORGANIZED HEALTH CARE EDUCATION/TRAINING PROGRAM

## 2023-10-09 PROCEDURE — 93010 ELECTROCARDIOGRAM REPORT: CPT | Performed by: INTERNAL MEDICINE

## 2023-10-09 PROCEDURE — 80053 COMPREHEN METABOLIC PANEL: CPT | Performed by: NURSE PRACTITIONER

## 2023-10-09 PROCEDURE — 82948 REAGENT STRIP/BLOOD GLUCOSE: CPT

## 2023-10-09 PROCEDURE — 99232 SBSQ HOSP IP/OBS MODERATE 35: CPT | Performed by: NURSE PRACTITIONER

## 2023-10-09 PROCEDURE — 99233 SBSQ HOSP IP/OBS HIGH 50: CPT | Performed by: STUDENT IN AN ORGANIZED HEALTH CARE EDUCATION/TRAINING PROGRAM

## 2023-10-09 RX ORDER — POTASSIUM CHLORIDE 20 MEQ/1
40 TABLET, EXTENDED RELEASE ORAL 2 TIMES DAILY
Status: DISCONTINUED | OUTPATIENT
Start: 2023-10-09 | End: 2023-10-12

## 2023-10-09 RX ORDER — POTASSIUM CHLORIDE 20 MEQ/1
40 TABLET, EXTENDED RELEASE ORAL ONCE
Status: COMPLETED | OUTPATIENT
Start: 2023-10-09 | End: 2023-10-09

## 2023-10-09 RX ADMIN — SPIRONOLACTONE 25 MG: 25 TABLET ORAL at 08:13

## 2023-10-09 RX ADMIN — INSULIN LISPRO 2 UNITS: 100 INJECTION, SOLUTION INTRAVENOUS; SUBCUTANEOUS at 08:14

## 2023-10-09 RX ADMIN — POTASSIUM CHLORIDE 40 MEQ: 1500 TABLET, EXTENDED RELEASE ORAL at 17:26

## 2023-10-09 RX ADMIN — INSULIN LISPRO 4 UNITS: 100 INJECTION, SOLUTION INTRAVENOUS; SUBCUTANEOUS at 12:26

## 2023-10-09 RX ADMIN — APIXABAN 5 MG: 5 TABLET, FILM COATED ORAL at 17:26

## 2023-10-09 RX ADMIN — POTASSIUM CHLORIDE 40 MEQ: 1500 TABLET, EXTENDED RELEASE ORAL at 09:01

## 2023-10-09 RX ADMIN — APIXABAN 5 MG: 5 TABLET, FILM COATED ORAL at 08:13

## 2023-10-09 RX ADMIN — ATORVASTATIN CALCIUM 10 MG: 10 TABLET, FILM COATED ORAL at 08:13

## 2023-10-09 RX ADMIN — MONTELUKAST 10 MG: 10 TABLET, FILM COATED ORAL at 21:35

## 2023-10-09 RX ADMIN — UMECLIDINIUM 1 PUFF: 62.5 AEROSOL, POWDER ORAL at 08:14

## 2023-10-09 RX ADMIN — INSULIN LISPRO 2 UNITS: 100 INJECTION, SOLUTION INTRAVENOUS; SUBCUTANEOUS at 21:35

## 2023-10-09 RX ADMIN — BUDESONIDE AND FORMOTEROL FUMARATE DIHYDRATE 2 PUFF: 160; 4.5 AEROSOL RESPIRATORY (INHALATION) at 17:27

## 2023-10-09 RX ADMIN — PREGABALIN 50 MG: 50 CAPSULE ORAL at 21:35

## 2023-10-09 RX ADMIN — PANTOPRAZOLE SODIUM 40 MG: 40 TABLET, DELAYED RELEASE ORAL at 05:21

## 2023-10-09 RX ADMIN — EMPAGLIFLOZIN 10 MG: 10 TABLET, FILM COATED ORAL at 08:15

## 2023-10-09 RX ADMIN — METOPROLOL SUCCINATE 50 MG: 50 TABLET, EXTENDED RELEASE ORAL at 08:13

## 2023-10-09 RX ADMIN — INSULIN LISPRO 4 UNITS: 100 INJECTION, SOLUTION INTRAVENOUS; SUBCUTANEOUS at 17:26

## 2023-10-09 RX ADMIN — BUMETANIDE 5 MG: 0.25 INJECTION INTRAMUSCULAR; INTRAVENOUS at 08:15

## 2023-10-09 RX ADMIN — Medication 6 MG: at 21:35

## 2023-10-09 RX ADMIN — BUDESONIDE AND FORMOTEROL FUMARATE DIHYDRATE 2 PUFF: 160; 4.5 AEROSOL RESPIRATORY (INHALATION) at 08:14

## 2023-10-09 RX ADMIN — BUMETANIDE 5 MG: 0.25 INJECTION INTRAMUSCULAR; INTRAVENOUS at 17:27

## 2023-10-09 NOTE — PROGRESS NOTES
Advanced Heart Failure / Pulmonary Hypertension Service Progress Note    Amita Galvan 66 y.o. male   MRN: [id number]  Unit/Bed#: MS August; Encounter: [id number]    Assessment:  Principal Problem:    Acute on chronic diastolic congestive heart failure ([address])  Active Problems:    Type 2 diabetes mellitus, without long-term current use of insulin (HCC)    Morbid obesity due to excess calories (HCC)    VICKY (obstructive sleep apnea)    Paroxysmal atrial fibrillation (HCC)    Stage 3a chronic kidney disease (HCC)    Severe persistent asthma without complication    Nasal congestion    67 yo male with HFimpEF with predominant HFpEF now with multiple admits for volume overload. He is on high dose bumex 5 mg BID as outpt with prn metolazone. His morbid obesity complicates his treatment with BMI 46. Weight on admission up to 349 lbs (hospital records show weight around 314 lbs during an admit in 2022). Reported more SOB and more edema in legs - metolazone not of help as outpt. Has CardioMems, goal PAD 15. PAD 16 10/6, 14 10/5. Subjective:   Patient seen and examined. No significant events overnight. Feeling improved from admission, denies current dyspnea at rest.  Still some TELLES. Lower extremity edema and abdominal distention improving. Objective: Intake/ Output: [id number] (-945)  Weight: 410>131>371 lbs  Telemetry: Reviewed    Today's Plan:  • Would continue IV diuresis with Bumex 5 mg IV twice daily at this time. Possible de-escalation near future. • Should ultimately consider repeat RHC and possible recalibration of CardioMEMS, whether inpatient or near-term outpatient. • Strict I/Os, daily weights, daily BMP. • Keep K>4, Mg>2.     Plan:  Chronic HFpEF, Stage C, NYHA III   Weight:  349 lbs 10/5/23 > 343 lbs today  BNP 10/6/23: 93      Studies      LHC 9/6/22: No obstructive CAD     Pharm Nuc Stress 9/2/22: Abnormal study due to the presence of an inferior and inferolateral infarct without significant ischemia.     RHC 3/9/22:   RA 4   RV 35/4   PA 35/12/21   PCWP 10   PA sat 64%   Travis CO 5.56 L/min   Travis CI 2.2L/min/m2   PVR 1.97 SAGASTUME      TTE 4/11/2023: LVEF 50%. Normal wall motion. Grade 2 DD. RV cavity size and systolic function normal.  LA mildly dilated. Trace MR, TR. Normal IVC. TTE 03/25/2020: LVEF 40-45%. LVIDd 6.12 cm. Normal RV.    TTE 07/19/2021: LVEF 50%. LVIDd 6.13 cm. Grade 1 DD. Normal RV. Trace MR and TR. Mildly dilated IVC.    TTE 11/12/2021: LVEF 55%. LVIDd 6.0 cm. Grade 1 DD. Normal RV. Trace TR.       Neurohormonal Blockade:   --Beta Blocker: metoprolol succinate 50 mg daily.    --ARNi / ACEi / ARB: No.    --Aldosterone Antagonist: spironolactone 25 mg QD.   --SGLT2 Inhibitor: Jardiance 10mg daily  --Home diuretic: bumex 5mg BID with potassium 40 meq BID with prn metolazone 2.5mg   --Inpatient diuretic: Bumex 5 mg IV TID>BID     Sudden Cardiac Death Risk Reduction:   --LVEF >35%.        Electrical Resynchronization:   --Candidacy for BiV device: narrow QRS.         Advanced Therapies:   Will continue to monitor.  LVEF recovered      Atrial fibrillation   [id number] = 3 (HF, HTN, DM).    Diagnosed 02/2022.   Anticoagulation on Eliquis.    Rate control: BB as above.   Rhythm control: No.      Hypertension, controlled   Rx: amlodipine 5mg daily      Hyperlipidemia   11/22/22:   HDL 55 LDL 85  Rx: atorvastatin 10 mg daily.         Diabetes mellitus, type II     Non-insulin dependent.   HbA1c 8/9/23: 7.1%      Obstructive sleep apnea  Not currently using CPAP given machine recall. Chronic respiratory failure   Asthma, moderate persistent    S/p gastric bypass (Samuel-en-Y)surgery    History of tobacco abuse   Carpal tunnel syndrome, bilateral    CKD, recent baseline 1.5-2.0  s/p CardioMems implant 3/9/22     Vitals:   Blood pressure 121/53, pulse 79, temperature 97.6 °F (36.4 °C), temperature source Oral, resp.  rate 17, height 6' 1" (1.854 m), weight (!) 156 kg (343 lb 4.8 oz), SpO2 96 %.    Body mass index is 45.29 kg/m². I/O last 3 completed shifts: In: 1560 [P.O.:1560]  Out: 4675 [Urine:4675]    Wt Readings from Last 10 Encounters:   10/09/23 (!) 156 kg (343 lb 4.8 oz)   10/05/23 (!) 158 kg (349 lb)   08/23/23 (!) 157 kg (346 lb)   08/09/23 (!) 156 kg (343 lb 12.8 oz)   08/04/23 (!) 158 kg (348 lb)   07/28/23 (!) 156 kg (344 lb 5.7 oz)   07/18/23 (!) 158 kg (349 lb)   05/01/23 (!) 149 kg (327 lb 6.4 oz)   04/18/23 (!) 147 kg (323 lb 13.7 oz)   04/10/23 (!) 154 kg (339 lb 11.2 oz)     Vitals:    10/08/23 2308 10/09/23 0231 10/09/23 0529 10/09/23 0715   BP: 140/74 (!) 121/49  121/53   BP Location:    Right arm   Pulse: 80 76  79   Resp:    17   Temp: 98.6 °F (37 °C) 98.1 °F (36.7 °C)  97.6 °F (36.4 °C)   TempSrc:    Oral   SpO2: 96% 91%  96%   Weight:   (!) 156 kg (343 lb 4.8 oz)    Height:           Physical Exam  Constitutional:       Appearance: Normal appearance. He is obese. HENT:      Head: Normocephalic. Nose: Nose normal.      Mouth/Throat:      Mouth: Mucous membranes are moist.   Eyes:      Conjunctiva/sclera: Conjunctivae normal.   Neck:      Comments: JVP mildly up  Cardiovascular:      Rate and Rhythm: Normal rate and regular rhythm. Comments: Mild pitting BLLE edema   Pulmonary:      Effort: Pulmonary effort is normal.      Breath sounds: Normal breath sounds. Musculoskeletal:      Cervical back: Neck supple. Skin:     General: Skin is warm and dry. Neurological:      General: No focal deficit present. Mental Status: He is alert and oriented to person, place, and time.    Psychiatric:         Mood and Affect: Mood normal.         Behavior: Behavior normal.         Current Facility-Administered Medications:   •  albuterol (PROVENTIL HFA,VENTOLIN HFA) inhaler 2 puff, 2 puff, Inhalation, Q4H PRN, Qiana Cancino DO  •  apixaban (ELIQUIS) tablet 5 mg, 5 mg, Oral, BID, Qiana Cancino DO, 5 mg at 10/09/23 0813  •  atorvastatin (LIPITOR) tablet 10 mg, 10 mg, Oral, Daily, Elvan Pounds, DO, 10 mg at 10/09/23 0813  •  budesonide-formoterol (SYMBICORT) 160-4.5 mcg/act inhaler 2 puff, 2 puff, Inhalation, BID, Elvan Pounds, DO, 2 puff at 10/09/23 0814  •  bumetanide (BUMEX) injection 5 mg, 5 mg, Intravenous, BID, Pearley Beecham, DO, 5 mg at 10/09/23 0815  •  Empagliflozin (JARDIANCE) tablet 10 mg, 10 mg, Oral, Daily, Elvan Pounds, DO, 10 mg at 10/09/23 0815  •  insulin lispro (HumaLOG) 100 units/mL subcutaneous injection 1-6 Units, 1-6 Units, Subcutaneous, HS, Elvan Pounds, DO, 2 Units at 10/08/23 2117  •  insulin lispro (HumaLOG) 100 units/mL subcutaneous injection 2-12 Units, 2-12 Units, Subcutaneous, TID AC, 4 Units at 10/09/23 1226 **AND** Fingerstick Glucose (POCT), , , TID AC, Elvan Pounds, DO  •  melatonin tablet 6 mg, 6 mg, Oral, HS, Joe De Jesus MD, 6 mg at 10/08/23 2117  •  metoprolol succinate (TOPROL-XL) 24 hr tablet 50 mg, 50 mg, Oral, Daily, Elvan Pounds, DO, 50 mg at 10/09/23 0813  •  montelukast (SINGULAIR) tablet 10 mg, 10 mg, Oral, HS, Elvan Pounds, DO, 10 mg at 10/08/23 2117  •  pantoprazole (PROTONIX) EC tablet 40 mg, 40 mg, Oral, Daily, Elvan Pounds, DO, 40 mg at 10/09/23 9587  •  potassium chloride (K-DUR,KLOR-CON) CR tablet 40 mEq, 40 mEq, Oral, BID, Teo Hernandez PA-C  •  pregabalin (LYRICA) capsule 50 mg, 50 mg, Oral, HS, Elvan Pounds, DO, 50 mg at 10/08/23 2117  •  spironolactone (ALDACTONE) tablet 25 mg, 25 mg, Oral, Daily, Elvan Pounds, DO, 25 mg at 10/09/23 0813  •  umeclidinium 62.5 mcg/actuation inhaler AEPB 1 puff, 1 puff, Inhalation, Daily, Joe De Jesus MD, 1 puff at 10/09/23 0814    Labs & Results:      Results from last 7 days   Lab Units 10/09/23  0528 10/07/23  0544 10/06/23  1803   WBC Thousand/uL 7.73 8.09 9.86   HEMOGLOBIN g/dL 10.1* 9.8* 10.3*   HEMATOCRIT % 32.7* 32.3* 33.1*   PLATELETS Thousands/uL 257 249 290         Results from last 7 days   Lab Units 10/09/23  0528 10/08/23  0528 10/07/23  0544 10/06/23  1803   POTASSIUM mmol/L 3.3* 3.2* 3.8 3.6   CHLORIDE mmol/L 94* 95* 98 101   CO2 mmol/L 31 31 27 25   BUN mg/dL 29* 26* 21 18   CREATININE mg/dL 1.91* 1.75* 1.43* 1.23   CALCIUM mg/dL 8.2* 8.2* 8.0* 7.8*   ALK PHOS U/L 90  --   --  105*   ALT U/L 88*  --   --  69*   AST U/L 170*  --   --  52*             Thank you for the opportunity to participate in the care of this patient.     ALAYNA CamposC  Advanced Heart Failure  [address] [facility]

## 2023-10-09 NOTE — UTILIZATION REVIEW
Continued Stay Review    Date:  10/9                         Current Patient Class: IP Current Level of Care: MS    HPI:58 y.o. male initially admitted on 10/6 with Acute on chronic diastolic congestive heart failure - Chronic HFpEF, Stage C, NYHA III     Assessment/Plan:   Pt continues on IV Bumex 5 mg BID with poss de-escalation soon. Should consider repeat R heart cath either IP of soon after d/c. Had had weight loss. Need to aggressively manage lytes. Has increase in BUN/Cr today. On exam today pt feels some improvement. No dyspnea at rest but still have + TELLES with improvement of BLE edema and abd distention.       Vital Signs:   10/09/23 07:15:18 97.6 °F (36.4 °C) 79 17 121/53 76 96 % None (Room air)   10/09/23 02:31:07 98.1 °F (36.7 °C) 76 -- 121/49 Abnormal  73 91 % --   10/08/23 23:08:18 98.6 °F (37 °C) 80 -- 140/74 96 96 % --   10/08/23 2256 -- -- -- -- -- -- None (Room air)   10/08/23 19:02:31 97.9 °F (36.6 °C) 92 -- 142/78 99 94 % --   10/08/23 15:29:07 98.6 °F (37 °C) 80 17 120/72 88 95 % None (Room air)   10/08/23 11:17:10 98.3 °F (36.8 °C) 80 16 119/76 90 95 % None (Room air)   10/08/23 07:08:56 98 °F (36.7 °C) 78 16 121/61 81 95 % None (Room air)   10/08/23 02:26:25 98 °F (36.7 °C) 76 -- 123/61 82 95 % --   10/07/23 2356 -- -- -- -- -- -- None (Room air)   10/07/23 22:25:37 100.2 °F (37.9 °C) 90 -- 136/76 96 97 % --   10/07/23 20:54:19 -- 81 -- 142/77 99 95 % --   10/07/23 18:51:10 99.8 °F (37.7 °C) 83 -- 138/76 97 94 % --   10/07/23 14:47:30 99.1 °F (37.3 °C) 83 -- 134/77 96 94 % --   10/07/23 11:19:12 99.1 °F (37.3 °C) 85 -- 135/78 97 96 % --   10/07/23 10:26:15 -- 83 -- 133/80 98 95 % --   10/07/23 1000 -- 81 -- 133/80 -- -- --   10/07/23 07:16:47 99.3 °F (37.4 °C) 88 16 141/78 99 94 % None (Room air)       Pertinent Labs/Diagnostic Results:   Results from last 7 days   Lab Units 10/07/23  0006   SARS-COV-2  Negative     Results from last 7 days   Lab Units 10/09/23  0528 10/07/23  0544 10/06/23  1803   WBC Thousand/uL 7.73 8.09 9.86   HEMOGLOBIN g/dL 10.1* 9.8* 10.3*   HEMATOCRIT % 32.7* 32.3* 33.1*   PLATELETS Thousands/uL 257 249 290   NEUTROS ABS Thousands/µL 4.61  --  5.94         Results from last 7 days   Lab Units 10/09/23  0528 10/08/23  0528 10/07/23  0544 10/06/23  1803 10/06/23  1402   SODIUM mmol/L 137 137 136 137 135   POTASSIUM mmol/L 3.3* 3.2* 3.8 3.6 4.4   CHLORIDE mmol/L 94* 95* 98 101 96   CO2 mmol/L 31 31 27 25 26   ANION GAP mmol/L 12 11 11 11 13   BUN mg/dL 29* 26* 21 18 18   CREATININE mg/dL 1.91* 1.75* 1.43* 1.23 1.34*   EGFR ml/min/1.73sq m 38 42 54 65 58   CALCIUM mg/dL 8.2* 8.2* 8.0* 7.8* 8.3*   MAGNESIUM mg/dL  --  2.1 1.9  --   --      Results from last 7 days   Lab Units 10/09/23  0528 10/06/23  1803   AST U/L 170* 52*   ALT U/L 88* 69*   ALK PHOS U/L 90 105*   TOTAL PROTEIN g/dL 6.5 6.3*   ALBUMIN g/dL 3.6 3.6   TOTAL BILIRUBIN mg/dL 1.04* 0.48     Results from last 7 days   Lab Units 10/09/23  1202 10/09/23  0807 10/08/23  2114 10/08/23  1643 10/08/23  1115 10/08/23  0708 10/07/23  2049 10/07/23  1638 10/07/23  1200 10/07/23  0549 10/06/23  2348   POC GLUCOSE mg/dl 232* 180* 228* 196* 234* 175* 228* 167* 210* 158* 188*     Results from last 7 days   Lab Units 10/09/23  0528 10/08/23  0528 10/07/23  0544 10/06/23  1803   GLUCOSE RANDOM mg/dL 185* 184* 162* 244*     Results from last 7 days   Lab Units 10/06/23  2209 10/06/23  2007 10/06/23  1803   HS TNI 0HR ng/L  --   --  15   HS TNI 2HR ng/L  --  13  --    HSTNI D2 ng/L  --  -2  --    HS TNI 4HR ng/L 16  --   --    HSTNI D4 ng/L 1  --   --      Results from last 7 days   Lab Units 10/06/23  1803   BNP pg/mL 93     Medications:   Scheduled Medications:  apixaban, 5 mg, Oral, BID  atorvastatin, 10 mg, Oral, Daily  budesonide-formoterol, 2 puff, Inhalation, BID  bumetanide, 5 mg, Intravenous, BID  Empagliflozin, 10 mg, Oral, Daily  insulin lispro, 1-6 Units, Subcutaneous, HS  insulin lispro, 2-12 Units, Subcutaneous, TID AC  melatonin, 6 mg, Oral, HS  metoprolol succinate, 50 mg, Oral, Daily  montelukast, 10 mg, Oral, HS  pantoprazole, 40 mg, Oral, Daily  potassium chloride, 40 mEq, Oral, BID  pregabalin, 50 mg, Oral, HS  spironolactone, 25 mg, Oral, Daily  umeclidinium, 1 puff, Inhalation, Daily      Continuous IV Infusions:     PRN Meds:  albuterol, 2 puff, Inhalation, Q4H PRN    Discharge Plan: TBD    Network Utilization Review Department  ATTENTION: Please call with any questions or concerns to [phone] and carefully listen to the prompts so that you are directed to the right person. All voicemails are confidential.   For Discharge needs, contact Care Management DC Support Team at [phone] opt. 2  Send all requests for admission clinical reviews, approved or denied determinations and any other requests to dedicated fax number below belonging to the campus where the patient is receiving treatment.  List of dedicated fax numbers for the Facilities:  [place] DENIALS (Administrative/Medical Necessity) [phone]   DISCHARGE SUPPORT TEAM (NETWORK) [address] [address] [facility] (Maternity/NICU/Pediatrics) [phone]   [address] [facility] 1000 [facility] [phone]   [address] [facility] [address] [address] [facility] [address] [facility] [address]  [place] [phone]

## 2023-10-09 NOTE — PROGRESS NOTES
[address]  Progress Note  Name: Steven Oreilly  MRN: [id number]  Unit/Bed#: -20 I Date of Admission: 10/6/2023   Date of Service: 10/9/2023 I Hospital Day: 3    Assessment/Plan   * Acute on chronic diastolic congestive heart failure [facility])  Assessment & Plan  Wt Readings from Last 3 Encounters:   10/09/23 (!) 156 kg (343 lb 4.8 oz)   10/05/23 (!) 158 kg (349 lb)   08/23/23 (!) 157 kg (346 lb)     · Presenting with worsening shortness of breath even at rest on admission. Reported increased lower extremity edema and at least 7 pound weight gain over the past 3-4 days prior to admission, despite dosing of metolazone as below  · S/p 40 mg IV Lasix during ED evaluation, give 3 mg IV Bumex additionally  · Heart failure service consult for further diuretic recommendations, patient follows with Dr. [employer]. · Echocardiogram 4/11/2023 EF 65%, grade 2 diastolic dysfunction. · Continue remainder of cardiac medications  · Monitor daily weights, I/O, volume status  · Low-sodium, fluid strict diet  · Telemetry monitoring given high diuretic needs  · At least daily BMP while on IV diuresis, may need more frequently dependent on diuretic plan. Replace electrolytes as needed and monitor renal function  · Cardiology consultation appreciated. · On Bumex 5 mg 3 times daily transitioned to 5mg iv BID 10/8 - Continue per heart failure   · Monitor creatinine.   Continues to increase 1.91        Nasal congestion  Assessment & Plan  · Patient reporting nasal congestion at this time without fevers additionally  · COVID-19 PCR negative   · Saline nasal spray as needed    Severe persistent asthma without complication  Assessment & Plan  · Does not appear in acute exacerbation, continue home inhalers/nebulizers/Singulair    Stage 3a chronic kidney disease [facility])  Assessment & Plan  Lab Results   Component Value Date    EGFR 38 10/09/2023    EGFR 42 10/08/2023    EGFR 54 10/07/2023    CREATININE 1.91 (H) 10/09/2023    CREATININE 1.75 (H) 10/08/2023    CREATININE 1.43 (H) 10/07/2023   · Renal function improved from recent values  · Monitor with BMP given need for diuresis  · Creatinine continues to increase     Paroxysmal atrial fibrillation (HCC)  Assessment & Plan  · Rate controlled on metoprolol succinate, continue at home dosing  · Continue anticoagulation with Eliquis    VICKY (obstructive sleep apnea)  Assessment & Plan  · Not currently on CPAP as patient awaiting new device (current device recalled)  · Advised follow-up with prescribing physician    Morbid obesity due to excess calories ([address])  Assessment & Plan  · Dietary and lifestyle modification advised. Morbid obesity complicates all facets of care    Type 2 diabetes mellitus, without long-term current use of insulin [facility])  Assessment & Plan  Lab Results   Component Value Date    HGBA1C 7.1 (A) 08/09/2023       Recent Labs     10/08/23  2114 10/09/23  0807 10/09/23  1202 10/09/23  1709   POCGLU 228* 180* 232* 234*       Blood Sugar Average: Last 72 hrs:  · (P) 202. 5Reasonably well controlled as outpatient  · Continue with Jardiance given heart failure, hold off on other oral medications. Start correctional insulin coverage with Accu-Cheks while admitted  · Add meal time coverage  · Carb controlled diet  · Initiate hypoglycemia protocol       VTE Pharmacologic Prophylaxis: VTE Score: 6 High Risk (Score >/= 5) - Pharmacological DVT Prophylaxis Ordered: apixaban (Eliquis). Sequential Compression Devices Ordered. Patient Centered Rounds: I performed bedside rounds with nursing staff today. Discussions with Specialists or Other Care Team Provider: nursing     Education and Discussions with Family / Patient: patient . Total Time Spent on Date of Encounter in care of patient: 25 mins.  This time was spent on one or more of the following: performing physical exam; counseling and coordination of care; obtaining or reviewing history; documenting in the medical record; reviewing/ordering tests, medications or procedures; communicating with other healthcare professionals and discussing with patient's family/caregivers. Current Length of Stay: 3 day(s)  Current Patient Status: Inpatient   Certification Statement: The patient will continue to require additional inpatient hospital stay due to ongoing iv diuresis   Discharge Plan: Anticipate discharge in 48-72 hrs to home. Code Status: Level 1 - Full Code    Subjective:   Pt is feeling well no sob at this time . Understands plan of care . No n/v/d pt reports keeping to his water restriction. Objective:     Vitals:   Temp (24hrs), Av.1 °F (36.7 °C), Min:97.6 °F (36.4 °C), Max:98.6 °F (37 °C)    Temp:  [97.6 °F (36.4 °C)-98.6 °F (37 °C)] 98 °F (36.7 °C)  HR:  [76-87] 87  Resp:  [17-18] 18  BP: (121-140)/(49-74) 128/72  SpO2:  [91 %-96 %] 95 %  Body mass index is 45.29 kg/m². Input and Output Summary (last 24 hours): Intake/Output Summary (Last 24 hours) at 10/9/2023 1913  Last data filed at 10/9/2023 1904  Gross per 24 hour   Intake 1260 ml   Output 2080 ml   Net -820 ml         Physical Exam  Constitutional:       General: He is not in acute distress. Appearance: He is obese. He is not ill-appearing, toxic-appearing or diaphoretic. HENT:      Head: Normocephalic and atraumatic. Nose: No congestion. Eyes:      General:         Right eye: No discharge. Left eye: No discharge. Cardiovascular:      Rate and Rhythm: Normal rate. Heart sounds: No murmur heard. No friction rub. No gallop. Pulmonary:      Effort: No respiratory distress. Breath sounds: No stridor. No wheezing, rhonchi or rales. Chest:      Chest wall: No tenderness. Abdominal:      General: There is no distension. Palpations: There is no mass. Tenderness: There is no abdominal tenderness. There is no guarding or rebound. Hernia: No hernia is present.    Musculoskeletal: General: No swelling, tenderness, deformity or signs of injury. Right lower leg: No edema. Left lower leg: No edema. Skin:     Coloration: Skin is not jaundiced or pale. Findings: No bruising, erythema, lesion or rash. Neurological:      Mental Status: He is alert and oriented to person, place, and time.    Psychiatric:         Behavior: Behavior normal.          Additional Data:     Labs:  Results from last 7 days   Lab Units 10/09/23  0528   WBC Thousand/uL 7.73   HEMOGLOBIN g/dL 10.1*   HEMATOCRIT % 32.7*   PLATELETS Thousands/uL 257   NEUTROS PCT % 59   LYMPHS PCT % 13*   MONOS PCT % 8   EOS PCT % 18*     Results from last 7 days   Lab Units 10/09/23  0528   SODIUM mmol/L 137   POTASSIUM mmol/L 3.3*   CHLORIDE mmol/L 94*   CO2 mmol/L 31   BUN mg/dL 29*   CREATININE mg/dL 1.91*   ANION GAP mmol/L 12   CALCIUM mg/dL 8.2*   ALBUMIN g/dL 3.6   TOTAL BILIRUBIN mg/dL 1.04*   ALK PHOS U/L 90   ALT U/L 88*   AST U/L 170*   GLUCOSE RANDOM mg/dL 185*         Results from last 7 days   Lab Units 10/09/23  1709 10/09/23  1202 10/09/23  0807 10/08/23  2114 10/08/23  1643 10/08/23  1115 10/08/23  0708 10/07/23  2049 10/07/23  1638 10/07/23  1200 10/07/23  0549 10/06/23  2348   POC GLUCOSE mg/dl 234* 232* 180* 228* 196* 234* 175* 228* 167* 210* 158* 188*               Lines/Drains:  Invasive Devices     Peripheral Intravenous Line  Duration           Peripheral IV 10/06/23 Left Antecubital 3 days                  Telemetry:  Telemetry Orders (From admission, onward)             24 Hour Telemetry Monitoring  Continuous x 24 Hours (Telem)        Question:  Reason for 24 Hour Telemetry  Answer:  Decompensated CHF- and any one of the following: continuous diuretic infusion or total diuretic dose >200 mg daily, associated electrolyte derangement (I.e. K < 3.0), ionotropic drip (continuous infusion), hx of ventricular arrhythmia, or new EF < 35%                 Telemetry Reviewed: Normal Sinus Rhythm  Indication for Continued Telemetry Use: Arrthymias requiring medical therapy             Imaging: No pertinent imaging reviewed. Recent Cultures (last 7 days):         Last 24 Hours Medication List:   Current Facility-Administered Medications   Medication Dose Route Frequency Provider Last Rate   • albuterol  2 puff Inhalation Q4H PRN Haylee Corea, DO     • apixaban  5 mg Oral BID Haylee Anmol, DO     • atorvastatin  10 mg Oral Daily Haylee Anmol, DO     • budesonide-formoterol  2 puff Inhalation BID Haylee Anmol, DO     • bumetanide  5 mg Intravenous BID Kena Barrientos, DO     • Empagliflozin  10 mg Oral Daily Haylee Anmol, DO     • insulin lispro  1-6 Units Subcutaneous HS Haylee Corea, DO     • insulin lispro  2-12 Units Subcutaneous TID AC Haylee Corea, DO     • melatonin  6 mg Oral HS Joe Barron MD     • metoprolol succinate  50 mg Oral Daily Haylee Anmol, DO     • montelukast  10 mg Oral HS Haylee Anmol, DO     • pantoprazole  40 mg Oral Daily Haylee Anmol, DO     • potassium chloride  40 mEq Oral BID Jena Perry PA-C     • pregabalin  50 mg Oral HS Haylee Corea, DO     • spironolactone  25 mg Oral Daily Haylee Anmol, DO     • umeclidinium  1 puff Inhalation Daily Joe Barron MD          Today, Patient Was Seen By: RODRIGUE Bernal    **Please Note: This note may have been constructed using a voice recognition system. **

## 2023-10-09 NOTE — PROGRESS NOTES
Advanced Heart Failure/Pulmonary Hypertension - Attending Note - Belkis Glendy 66 y.o. male MRN: [id number]    Please see the most recent comprehensive heart failure note by the AP/resident/fellow for complete documentation; this is the attending attestation only. Assessment:  66 y.o. male Hx per chart p/w volume overload despite recent cardiomems PAD readings at his target 15-18 range. Good Rx and diet compliance he says. HFimpEF, EF 50% now  # s/p CardioMems implant 3/9/22    AF on [facility] 9/6/22: No obstructive CAD  # Hypertension  # Hyperlipidemia  # Diabetes mellitus, type II      # Obstructive sleep apnea , off CPAP x 1 year 2/2 recall  # S/p gastric bypass (Samuel-en-Y)surgery     Morbidly obese  # History of tobacco abuse    # Carpal tunnel syndrome, bilateral     # CKD  # Chronic respiratory failure    # Asthma, severe persistent     2021 PFTs:  • Interpretation:  •    • • Severe obstructive airflow defect on spirometry  •    • • Significant improvement in airflow or forced vital capacity in response to the administration of bronchodilator per ATS standards.   •    • • Air trapping as indicated by the lung volumes  •    • • Mild decrease in diffusion capacity  •    • • Flow-volume loop consistent with obstruction      I reviewed all pertinent labs/imaging/data including but not limited to:    Temp:  [97.6 °F (36.4 °C)-98.6 °F (37 °C)] 97.6 °F (36.4 °C)  HR:  [76-92] 79  Resp:  [17] 17  BP: (120-142)/(49-78) 121/53     Weight (last 2 days)     Date/Time Weight    10/09/23 0529 156 (343.3)    10/08/23 0530 156 (344.8)    10/07/23 0600 159 (349.6)           Intake/Output Summary (Last 24 hours) at 10/9/2023 1334  Last data filed at 10/9/2023 1300  Gross per 24 hour   Intake 1560 ml   Output 2780 ml   Net -1220 ml       Results from last 7 days   Lab Units 10/09/23  0528 10/08/23  0528 10/07/23  0544   CREATININE mg/dL 1.91* 1.75* 1.43*     Lab Results   Component Value Date    K 3.3 (L) 10/09/2023     Lab Results   Component Value Date    HGBA1C 7.1 (A) 08/09/2023     Lab Results   Component Value Date    CWS5RZUUCSBF 1.270 09/01/2022     Lab Results   Component Value Date    LDLCALC 86 10/06/2023     Lab Results   Component Value Date    BNP 93 10/06/2023      Lab Results   Component Value Date    NTBNP 697 (H) 04/10/2023                 Drips:        Cr BL may be 1.7 range    Plan:  Warm, near euvolemic by exam though limited by habitus  Feels slightly better with increased UO and weight loss since admission  Note discrepancy between clinical volume overload described on arrival and cardiomems near target range with recent PAD 15-18  Cr near his BL    His arrival weight was documented as 349lbs>>now 343lbs after diuresis  He states his best home dry weights have been 340lbs recently, though he was consistently 320s in recent years    cw current IV diuresis regimen after K repletion  Replete lytes aggressively  Strict IOs and weights    May consider cardiomems calibration check pending his course    Trend rising LFTs    He has been off CPAP x 1 year 2/2 recall - needs to get this reinstated    He is on somewhat complex pulm regimen for his severe asthma - defer to primary team to assess if taking and any optimization possible    cw AC    Neurohormonal Blockade:    --Beta Blocker: metoprolol succinate 50 mg daily.     --ARNi / ACEi / ARB: No.  --Aldosterone Antagonist: spironolactone 25 mg daily    --SGLT2 Inhibitor: Jardiance 10 mg     --prior home Diuretic: Bumex 5 mg BID, K 40 meq BID, prn metolazone      Sudden Cardiac Death Risk Reduction:    --LVEF >35%.          Electrical Resynchronization:    --Candidacy for BiV device: narrow QRS.          Advanced Therapies:  Will continue to monitor.  LVEF recovered     Studies:  I have reviewed all pertinent patient data/labs/imaging where available, including but not limited to the below studies.  Selected results may be displayed here but comprehensive listing is omitted for note clarity and can be found in the epic chart. ECG. Echo. Stress. Cath. Thank you for the opportunity to participate in the care of this patient.     Philomena Nageotte, MD  Attending Physician  Advanced Heart Failure and Transplant Cardiology  [facility]

## 2023-10-09 NOTE — PLAN OF CARE
Problem: CARDIOVASCULAR - ADULT  Goal: Maintains optimal cardiac output and hemodynamic stability  Description: INTERVENTIONS:  - Monitor I/O, vital signs and rhythm  - Monitor for S/S and trends of decreased cardiac output  - Administer and titrate ordered vasoactive medications to optimize hemodynamic stability  - Assess quality of pulses, skin color and temperature  - Assess for signs of decreased coronary artery perfusion  - Instruct patient to report change in severity of symptoms  Outcome: Progressing  Goal: Absence of cardiac dysrhythmias or at baseline rhythm  Description: INTERVENTIONS:  - Continuous cardiac monitoring, vital signs, obtain 12 lead EKG if ordered  - Administer antiarrhythmic and heart rate control medications as ordered  - Monitor electrolytes and administer replacement therapy as ordered  Outcome: Progressing     Problem: METABOLIC, FLUID AND ELECTROLYTES - ADULT  Goal: Electrolytes maintained within normal limits  Description: INTERVENTIONS:  - Monitor labs and assess patient for signs and symptoms of electrolyte imbalances  - Administer electrolyte replacement as ordered  - Monitor response to electrolyte replacements, including repeat lab results as appropriate  - Instruct patient on fluid and nutrition as appropriate  Outcome: Progressing  Goal: Fluid balance maintained  Description: INTERVENTIONS:  - Monitor labs   - Monitor I/O and WT  - Instruct patient on fluid and nutrition as appropriate  - Assess for signs & symptoms of volume excess or deficit  Outcome: Progressing  Goal: Glucose maintained within target range  Description: INTERVENTIONS:  - Monitor Blood Glucose as ordered  - Assess for signs and symptoms of hyperglycemia and hypoglycemia  - Administer ordered medications to maintain glucose within target range  - Assess nutritional intake and initiate nutrition service referral as needed  Outcome: Progressing

## 2023-10-10 LAB
ALBUMIN SERPL BCP-MCNC: 3.7 G/DL (ref 3.5–5)
ALP SERPL-CCNC: 94 U/L (ref 34–104)
ALT SERPL W P-5'-P-CCNC: 124 U/L (ref 7–52)
ANION GAP SERPL CALCULATED.3IONS-SCNC: 11 MMOL/L
AST SERPL W P-5'-P-CCNC: 213 U/L (ref 13–39)
BILIRUB SERPL-MCNC: 0.86 MG/DL (ref 0.2–1)
BUN SERPL-MCNC: 32 MG/DL (ref 5–25)
CALCIUM SERPL-MCNC: 8.1 MG/DL (ref 8.4–10.2)
CHLORIDE SERPL-SCNC: 95 MMOL/L (ref 96–108)
CO2 SERPL-SCNC: 30 MMOL/L (ref 21–32)
CREAT SERPL-MCNC: 1.84 MG/DL (ref 0.6–1.3)
ERYTHROCYTE [DISTWIDTH] IN BLOOD BY AUTOMATED COUNT: 17 % (ref 11.6–15.1)
GFR SERPL CREATININE-BSD FRML MDRD: 40 ML/MIN/1.73SQ M
GLUCOSE SERPL-MCNC: 176 MG/DL (ref 65–140)
GLUCOSE SERPL-MCNC: 180 MG/DL (ref 65–140)
GLUCOSE SERPL-MCNC: 212 MG/DL (ref 65–140)
GLUCOSE SERPL-MCNC: 238 MG/DL (ref 65–140)
GLUCOSE SERPL-MCNC: 266 MG/DL (ref 65–140)
HCT VFR BLD AUTO: 33.9 % (ref 36.5–49.3)
HGB BLD-MCNC: 10.5 G/DL (ref 12–17)
MCH RBC QN AUTO: 25.9 PG (ref 26.8–34.3)
MCHC RBC AUTO-ENTMCNC: 31 G/DL (ref 31.4–37.4)
MCV RBC AUTO: 84 FL (ref 82–98)
PLATELET # BLD AUTO: 277 THOUSANDS/UL (ref 149–390)
PMV BLD AUTO: 10.5 FL (ref 8.9–12.7)
POTASSIUM SERPL-SCNC: 3.4 MMOL/L (ref 3.5–5.3)
PROT SERPL-MCNC: 6.8 G/DL (ref 6.4–8.4)
RBC # BLD AUTO: 4.06 MILLION/UL (ref 3.88–5.62)
SODIUM SERPL-SCNC: 136 MMOL/L (ref 135–147)
WBC # BLD AUTO: 8.47 THOUSAND/UL (ref 4.31–10.16)

## 2023-10-10 PROCEDURE — 99232 SBSQ HOSP IP/OBS MODERATE 35: CPT | Performed by: PHYSICIAN ASSISTANT

## 2023-10-10 PROCEDURE — 82948 REAGENT STRIP/BLOOD GLUCOSE: CPT

## 2023-10-10 PROCEDURE — NC001 PR NO CHARGE: Performed by: STUDENT IN AN ORGANIZED HEALTH CARE EDUCATION/TRAINING PROGRAM

## 2023-10-10 PROCEDURE — 99233 SBSQ HOSP IP/OBS HIGH 50: CPT | Performed by: STUDENT IN AN ORGANIZED HEALTH CARE EDUCATION/TRAINING PROGRAM

## 2023-10-10 PROCEDURE — 99223 1ST HOSP IP/OBS HIGH 75: CPT | Performed by: INTERNAL MEDICINE

## 2023-10-10 PROCEDURE — 80053 COMPREHEN METABOLIC PANEL: CPT | Performed by: NURSE PRACTITIONER

## 2023-10-10 PROCEDURE — 85027 COMPLETE CBC AUTOMATED: CPT | Performed by: NURSE PRACTITIONER

## 2023-10-10 RX ORDER — POTASSIUM CHLORIDE 20 MEQ/1
40 TABLET, EXTENDED RELEASE ORAL ONCE
Status: COMPLETED | OUTPATIENT
Start: 2023-10-10 | End: 2023-10-10

## 2023-10-10 RX ORDER — FLUTICASONE PROPIONATE 50 MCG
1 SPRAY, SUSPENSION (ML) NASAL 2 TIMES DAILY
Status: DISCONTINUED | OUTPATIENT
Start: 2023-10-10 | End: 2023-10-12 | Stop reason: HOSPADM

## 2023-10-10 RX ORDER — INSULIN LISPRO 100 [IU]/ML
5 INJECTION, SOLUTION INTRAVENOUS; SUBCUTANEOUS
Status: DISCONTINUED | OUTPATIENT
Start: 2023-10-10 | End: 2023-10-12 | Stop reason: HOSPADM

## 2023-10-10 RX ORDER — LORATADINE 10 MG/1
10 TABLET ORAL DAILY
Status: DISCONTINUED | OUTPATIENT
Start: 2023-10-10 | End: 2023-10-12 | Stop reason: HOSPADM

## 2023-10-10 RX ADMIN — APIXABAN 5 MG: 5 TABLET, FILM COATED ORAL at 09:02

## 2023-10-10 RX ADMIN — INSULIN LISPRO 3 UNITS: 100 INJECTION, SOLUTION INTRAVENOUS; SUBCUTANEOUS at 21:19

## 2023-10-10 RX ADMIN — ATORVASTATIN CALCIUM 10 MG: 10 TABLET, FILM COATED ORAL at 09:02

## 2023-10-10 RX ADMIN — METOPROLOL SUCCINATE 50 MG: 50 TABLET, EXTENDED RELEASE ORAL at 09:08

## 2023-10-10 RX ADMIN — PANTOPRAZOLE SODIUM 40 MG: 40 TABLET, DELAYED RELEASE ORAL at 06:06

## 2023-10-10 RX ADMIN — MONTELUKAST 10 MG: 10 TABLET, FILM COATED ORAL at 21:19

## 2023-10-10 RX ADMIN — POTASSIUM CHLORIDE 40 MEQ: 1500 TABLET, EXTENDED RELEASE ORAL at 11:51

## 2023-10-10 RX ADMIN — INSULIN LISPRO 4 UNITS: 100 INJECTION, SOLUTION INTRAVENOUS; SUBCUTANEOUS at 12:53

## 2023-10-10 RX ADMIN — PREGABALIN 50 MG: 50 CAPSULE ORAL at 21:19

## 2023-10-10 RX ADMIN — INSULIN LISPRO 5 UNITS: 100 INJECTION, SOLUTION INTRAVENOUS; SUBCUTANEOUS at 17:48

## 2023-10-10 RX ADMIN — FLUTICASONE PROPIONATE 1 SPRAY: 50 SPRAY, METERED NASAL at 11:52

## 2023-10-10 RX ADMIN — BUDESONIDE AND FORMOTEROL FUMARATE DIHYDRATE 2 PUFF: 160; 4.5 AEROSOL RESPIRATORY (INHALATION) at 17:46

## 2023-10-10 RX ADMIN — FLUTICASONE PROPIONATE 1 SPRAY: 50 SPRAY, METERED NASAL at 17:47

## 2023-10-10 RX ADMIN — INSULIN LISPRO 4 UNITS: 100 INJECTION, SOLUTION INTRAVENOUS; SUBCUTANEOUS at 17:48

## 2023-10-10 RX ADMIN — BUMETANIDE 5 MG: 0.25 INJECTION INTRAMUSCULAR; INTRAVENOUS at 09:04

## 2023-10-10 RX ADMIN — LORATADINE 10 MG: 10 TABLET ORAL at 11:52

## 2023-10-10 RX ADMIN — UMECLIDINIUM 1 PUFF: 62.5 AEROSOL, POWDER ORAL at 09:05

## 2023-10-10 RX ADMIN — INSULIN LISPRO 2 UNITS: 100 INJECTION, SOLUTION INTRAVENOUS; SUBCUTANEOUS at 09:05

## 2023-10-10 RX ADMIN — SPIRONOLACTONE 25 MG: 25 TABLET ORAL at 09:02

## 2023-10-10 RX ADMIN — Medication 6 MG: at 21:19

## 2023-10-10 RX ADMIN — POTASSIUM CHLORIDE 40 MEQ: 1500 TABLET, EXTENDED RELEASE ORAL at 09:02

## 2023-10-10 RX ADMIN — EMPAGLIFLOZIN 10 MG: 10 TABLET, FILM COATED ORAL at 09:04

## 2023-10-10 RX ADMIN — POTASSIUM CHLORIDE 40 MEQ: 1500 TABLET, EXTENDED RELEASE ORAL at 17:48

## 2023-10-10 RX ADMIN — BUMETANIDE 5 MG: 0.25 INJECTION INTRAMUSCULAR; INTRAVENOUS at 18:01

## 2023-10-10 RX ADMIN — BUDESONIDE AND FORMOTEROL FUMARATE DIHYDRATE 2 PUFF: 160; 4.5 AEROSOL RESPIRATORY (INHALATION) at 09:05

## 2023-10-10 NOTE — PROGRESS NOTES
Advanced Heart Failure/Pulmonary Hypertension - Attending Note - Eli Marvin 66 y.o. male MRN: [id number]    Please see the most recent comprehensive heart failure note by the AP/resident/fellow for complete documentation; this is the attending attestation only. Assessment:  66 y.o. male Hx per chart p/w volume overload despite recent cardiomems PAD readings near his target 15-18 range. Good Rx and diet compliance he says. HFimpEF, EF 50% now  s/p CardioMems implant 3/9/22    AF on [facility] 9/6/22: No obstructive CAD  # Hypertension  # Hyperlipidemia  # Diabetes mellitus, type II      # Obstructive sleep apnea , off CPAP x 1 year 2/2 recall  # S/p gastric bypass (Samuel-en-Y)surgery in 2007, pre surgical weight low 400lbs he says  Morbidly obese  # History of tobacco abuse    # Carpal tunnel syndrome, bilateral     # CKD  # Chronic respiratory failure    # Asthma, severe persistent     2021 PFTs:  • Interpretation:  • • Severe obstructive airflow defect on spirometry  • • Significant improvement in airflow or forced vital capacity in response to the administration of bronchodilator per ATS standards.   • • Air trapping as indicated by the lung volumes  • • Mild decrease in diffusion capacity  • • Flow-volume loop consistent with obstruction      I reviewed all pertinent labs/imaging/data including but not limited to:    Temp:  [97.9 °F (36.6 °C)-98.8 °F (37.1 °C)] 97.9 °F (36.6 °C)  HR:  [74-87] 74  Resp:  [16-20] 16  BP: (115-136)/(69-73) 136/70     Weight (last 2 days)     Date/Time Weight    10/10/23 0600 152 (335.1)    10/09/23 0529 156 (343.3)    10/08/23 0530 156 (344.8)           Intake/Output Summary (Last 24 hours) at 10/10/2023 1239  Last data filed at 10/10/2023 1031  Gross per 24 hour   Intake 780 ml   Output 1170 ml   Net -390 ml       Results from last 7 days   Lab Units 10/10/23  0604 10/09/23  0528 10/08/23  0528   CREATININE mg/dL 1.84* 1.91* 1.75*     Lab Results   Component Value Date    K 3.4 (L) 10/10/2023     Lab Results   Component Value Date    HGBA1C 7.1 (A) 08/09/2023     Lab Results   Component Value Date    FXA6HXEZSEWT 1.270 09/01/2022     Lab Results   Component Value Date    LDLCALC 86 10/06/2023     Lab Results   Component Value Date    BNP 93 10/06/2023      Lab Results   Component Value Date    NTBNP 697 (H) 04/10/2023                 Drips:        Cr BL may be 1.7 range    Plan:  Warm, near euvolemic by exam though limited by habitus  Feels slightly better so far    cw current IV diuresis regimen  Replete lytes aggressively  Strict IOs and weights    Plan for cardiomems calibration check with RHC 10/11/23    Trend rising LFTs - workup per primary team  Less likely hemodynamically mediated based on data now    He has been off CPAP x 1 year 2/2 recall - needs to get this reinstated    He is on somewhat complex pulm regimen for his severe asthma.  Possible more significant pulmonary limitation which may be contributing to his Sx and readmissions, as elevated filling pressures do not clearly stand out as primary  based on available cardiomems data    Needs weight loss    cw AC    Key info from my prior notes:    Warm, near euvolemic by exam though limited by habitus  Feels slightly better with increased UO and weight loss since admission  Note discrepancy between clinical volume overload described on arrival and cardiomems near target range with recent PAD 15-18  Cr near his BL    His arrival weight was documented as 349lbs>>now 343lbs after diuresis  He states his best home dry weights have been 340lbs recently, though he was consistently 320s in recent years    Neurohormonal Blockade:    --Beta Blocker: metoprolol succinate 50 mg daily.     --ARNi / ACEi / ARB: No.  --Aldosterone Antagonist: spironolactone 25 mg daily    --SGLT2 Inhibitor: Jardiance 10 mg     --prior home Diuretic: Bumex 5 mg BID, K 40 meq BID, prn metolazone      Sudden Cardiac Death Risk Reduction:    --LVEF >35%.         Electrical Resynchronization:    --Candidacy for BiV device: narrow QRS.          Advanced Therapies:  Will continue to monitor.  LVEF recovered     Studies:  I have reviewed all pertinent patient data/labs/imaging where available, including but not limited to the below studies. Selected results may be displayed here but comprehensive listing is omitted for note clarity and can be found in the epic chart. ECG. Echo. Stress. Cath. Thank you for the opportunity to participate in the care of this patient.     Anthony Webb MD  Attending Physician  Advanced Heart Failure and Transplant Cardiology  [facility]

## 2023-10-10 NOTE — CONSULTS
PULMONOLOGY CONSULT NOTE     Name: Vinicio Kelly   Age & Sex: 66 y.o. male   MRN: [id number]  Unit/Bed#: -86   Encounter: [id number]        Reason for consultation: persisted asthma    Requesting physician: Ruslan Brown    Assessment and Plan:    1. HFrEF with exacerbation  2. Af on AC  3. VICKY not on Cpap  4. Severe eosinophilic asthma  5. Morbid obesity  6. CKD  7. DM2    Presented with heart failure exacerbation with fluid overload treated with aggressive diuretics. Symptoms improving  Patient also has history of of severe hemophilic asthma which he was treated with Guillermo Irish and Spiriva 2 times daily as an outpatient. Currently patient is not in any asthma exacerbation  Patient also has history of VICKY however is not on CPAP. Will attempt to get a replacement for him. This will be done as an outpatient setting. Patient currently does not want CPAP in hospital  I will continue with current regimen: Symbicort 2 puffs twice daily, Spiriva daily, singulair and albuterol as needed. Will add back spiriva since he has frequent admission for respiratory issue. Patient will continue to follow-up with us as an outpatient which I will set up upon discharge  Will give incentive spirometry to help with lung compliance  Diuresis as per primary and cardio. Noted pending RHC per cardio. Hold AC before procedure    History of Present Illness   HPI:  Vinicio Kelly is a 66 y.o. male with PMHx morbid obesity, severe persistent eosinophilic asthma, VICKY, Af on AC, HFrEF, was admitted to our hospital on 10/6 due to heart failure exacerbation with fluid overload. Patient was 7 pounds above his baseline body weight upon admission. He was treated aggressively with diuretics and manage his heart failure by cardiologist.  This is his third admission for heart failure exacerbation leading past 6 months.   We were consulted to manage his sleep apnea and asthma    Patient has history of severe persistent eosinophilic asthma (eos 1580 in 10/2023, IgE 435 in 12/2022), with previous frequent exacerbation. He was never being intubated because of asthma. Recently he started with Fasenra injection and ever since then his asthma has been well controlled. His last exacerbation was in 3/2023 and Lucrecia Rasp was started right after. He is currently on Symbicort 160-4.5 2puff BID, Fasenra injection and albuterol PRN. He did not use albuterol as daily basis reports to be compliant with medication. Spiriva was disconnected due to well-controlled of his symptoms. He follows with our clinic and is scheduled to see us again. Patient was also diagnosed with sleep apnea back in 2019. He was given CPAP and was compliant with it however his machine was recalled and never been replaced for at least 1 year. Patient felt his sleep apnea was actually getting better and less daytime sleepness now. PFT 2021: severe obstructive airflow with FEV1 of 39%  Sleep study 2019: moderate VICKY and mild hypoxia, CONNIE 18.9      Seen and examined at bedside today. Patient is resting comfortably in chair, is on room air, is not in respiratory distress stated that he still have congested nose which is secondary to seasonal allergy. He denies any shortness of breath, cough, chest tightness at this point. Review of systems:  12 point review of systems was completed and was otherwise negative except as listed in HPI.       Historical Information   Past Medical History:   Diagnosis Date   • Acute gastritis without hemorrhage     last assessed 3/24/17   • Asthma    • Atrial fibrillation [facility])    • Bilateral leg edema 02/19/2020   • CHF (congestive heart failure) (HCC)    • Coronary artery disease    • Daytime sleepiness 07/17/2019   • Diabetes mellitus ([address])    • Dyspnea on exertion 10/11/2021   • Edema of both feet 01/23/2020   • Gastric bypass status for obesity    • Hyperlipidemia    • Hypertension    • Hypokalemia 11/14/2021   • Impaired fasting glucose     last assessed 5/10/17   • Knee sprain, bilateral 2018   • Leg cramps 2022   • Obesity    • Viral gastroenteritis     last assessed 16     Past Surgical History:   Procedure Laterality Date   • CARDIAC CATHETERIZATION N/A 3/9/2022    Procedure: CARDIAC RHC W/ PRESSURE SENSOR;  Surgeon: Luis Armando Green MD;  Location: [facility] CARD[facility]AC [facility]; Service: Cardiology   • CARDIAC CATHETERIZATION N/A 2022    Procedure: Cardiac catheterization;  Surgeon: Maggie Kwong DO;  Location: [facility]; Service: Cardiology   • CARDIAC CATHETERIZATION N/A 2022    Procedure: Cardiac Coronary Angiogram;  Surgeon: Maggie Kwong DO;  Location: [facility];   Service: Cardiology   • CARPAL TUNNEL RELEASE      bilateral   • GASTRIC BYPASS      with han en y   • HERNIA REPAIR      ventral inscisional   • TONSILLECTOMY       Family History   Problem Relation Age of Onset   • Stroke Mother    • Hypertension Father    • Cancer Father    • COPD Father        Occupational History: former EMT    Social History: smokes cigars occasionally  Social History     Tobacco Use   Smoking Status Former   • Types: Pipe, Cigars   • Start date:    • Quit date: 2021   • Years since quittin.7   Smokeless Tobacco Never   Tobacco Comments    cigar once a day         Meds/Allergies   Current Facility-Administered Medications   Medication Dose Route Frequency   • albuterol (PROVENTIL HFA,VENTOLIN HFA) inhaler 2 puff  2 puff Inhalation Q4H PRN   • atorvastatin (LIPITOR) tablet 10 mg  10 mg Oral Daily   • budesonide-formoterol (SYMBICORT) 160-4.5 mcg/act inhaler 2 puff  2 puff Inhalation BID   • bumetanide (BUMEX) injection 5 mg  5 mg Intravenous BID   • Empagliflozin (JARDIANCE) tablet 10 mg  10 mg Oral Daily   • fluticasone (FLONASE) 50 mcg/act nasal spray 1 spray  1 spray Each Nare BID   • insulin lispro (HumaLOG) 100 units/mL subcutaneous injection 1-6 Units  1-6 Units Subcutaneous HS   • insulin lispro (HumaLOG) 100 units/mL subcutaneous injection 2-12 Units  2-12 Units Subcutaneous TID AC   • insulin lispro (HumaLOG) 100 units/mL subcutaneous injection 5 Units  5 Units Subcutaneous TID With Meals   • loratadine (CLARITIN) tablet 10 mg  10 mg Oral Daily   • melatonin tablet 6 mg  6 mg Oral HS   • metoprolol succinate (TOPROL-XL) 24 hr tablet 50 mg  50 mg Oral Daily   • montelukast (SINGULAIR) tablet 10 mg  10 mg Oral HS   • pantoprazole (PROTONIX) EC tablet 40 mg  40 mg Oral Daily   • potassium chloride (K-DUR,KLOR-CON) CR tablet 40 mEq  40 mEq Oral BID   • pregabalin (LYRICA) capsule 50 mg  50 mg Oral HS   • spironolactone (ALDACTONE) tablet 25 mg  25 mg Oral Daily     Facility-Administered Medications Prior to Admission   Medication   • furosemide (LASIX) injection 100 mg     Medications Prior to Admission   Medication   • Accu-Chek FastClix Lancets MISC   • apixaban (Eliquis) 5 mg   • atorvastatin (LIPITOR) 10 mg tablet   • benzonatate (TESSALON PERLES) 100 mg capsule   • Blood Glucose Monitoring Suppl (Accu-Chek Guide) w/Device KIT   • budesonide-formoterol (Symbicort) 160-4.5 mcg/act inhaler   • bumetanide (BUMEX) 1 mg tablet   • Empagliflozin (JARDIANCE) 10 MG TABS tablet   • EPINEPHrine (EPIPEN) 0.3 mg/0.3 mL SOAJ   • Fasenra Pen 30 MG/ML SOAJ   • levalbuterol (Xopenex) 1.25 mg/3 mL nebulizer solution   • metolazone (ZAROXOLYN) 2.5 mg tablet   • metoprolol succinate (TOPROL-XL) 50 mg 24 hr tablet   • montelukast (SINGULAIR) 10 mg tablet   • pantoprazole (PROTONIX) 40 mg tablet   • potassium chloride (K-DUR,KLOR-CON) 20 mEq tablet   • pregabalin (LYRICA) 50 mg capsule   • sitaGLIPtin (Januvia) 100 mg tablet   • Spiriva Respimat 1.25 MCG/ACT AERS inhaler   • spironolactone (ALDACTONE) 25 mg tablet     Allergies   Allergen Reactions   • Azithromycin Other (See Comments)     Shaky, uneasy feeling    • Bactrim [Sulfamethoxazole-Trimethoprim] Other (See Comments)     shakiness       Vitals: Blood pressure 136/70, pulse 74, temperature 97.9 °F (36.6 °C), resp. rate 16, height 6' 1" (1.854 m), weight (!) 152 kg (335 lb 1.6 oz), SpO2 95 %., RA, Body mass index is 44.21 kg/m². Intake/Output Summary (Last 24 hours) at 10/10/2023 1408  Last data filed at 10/10/2023 1255  Gross per 24 hour   Intake 600 ml   Output 1810 ml   Net -1210 ml       Physical Exam  Constitutional:       General: He is not in acute distress. Appearance: He is obese. He is not ill-appearing. Cardiovascular:      Rate and Rhythm: Normal rate and regular rhythm. Pulmonary:      Breath sounds: No decreased breath sounds, wheezing or rales. Abdominal:      Palpations: Abdomen is soft. Tenderness: There is no abdominal tenderness. There is no guarding or rebound. Musculoskeletal:         General: Normal range of motion. Right lower leg: Edema (1) present. Left lower leg: Edema (1+) present. Neurological:      General: No focal deficit present. Mental Status: He is alert and oriented to person, place, and time. Psychiatric:         Mood and Affect: Mood normal.         Labs: I have personally reviewed pertinent lab results.   Laboratory and Diagnostics  Results from last 7 days   Lab Units 10/10/23  0604 10/09/23  0528 10/07/23  0544 10/06/23  1803   WBC Thousand/uL 8.47 7.73 8.09 9.86   HEMOGLOBIN g/dL 10.5* 10.1* 9.8* 10.3*   HEMATOCRIT % 33.9* 32.7* 32.3* 33.1*   PLATELETS Thousands/uL 277 257 249 290   NEUTROS PCT %  --  59  --  60   MONOS PCT %  --  8  --  8   EOS PCT %  --  18*  --  16*     Results from last 7 days   Lab Units 10/10/23  0604 10/09/23  0528 10/08/23  0528 10/07/23  0544 10/06/23  1803 10/06/23  1402   SODIUM mmol/L 136 137 137 136 137 135   POTASSIUM mmol/L 3.4* 3.3* 3.2* 3.8 3.6 4.4   CHLORIDE mmol/L 95* 94* 95* 98 101 96   CO2 mmol/L 30 31 31 27 25 26   ANION GAP mmol/L 11 12 11 11 11 13   BUN mg/dL 32* 29* 26* 21 18 18   CREATININE mg/dL 1.84* 1.91* 1.75* 1.43* 1.23 1.34*   CALCIUM mg/dL 8.1* 8.2* 8.2* 8.0* 7.8* 8.3*   GLUCOSE RANDOM mg/dL 180* 185* 184* 162* 244*  --    ALT U/L 124* 88*  --   --  69*  --    AST U/L 213* 170*  --   --  52*  --    ALK PHOS U/L 94 90  --   --  105*  --    ALBUMIN g/dL 3.7 3.6  --   --  3.6  --    TOTAL BILIRUBIN mg/dL 0.86 1.04*  --   --  0.48  --      Results from last 7 days   Lab Units 10/08/23  0528 10/07/23  0544   MAGNESIUM mg/dL 2.1 1.9                                           Imaging and other studies: I have personally reviewed pertinent films in PACS  XR chest 1 view portable    Result Date: 10/7/2023  Impression: No acute cardiopulmonary disease. Workstation performed: [id number]         Pulmonary function testing:     PFT 2021: severe obstructive airflow with FEV1 of 39%  Sleep study 2019: moderate VICKY and mild hypoxia, CONNIE 18.9    EKG, Pathology, and Other Studies: I have personally reviewed pertinent reports.         Code Status: Level 1 - Full Code    Bruce Cespedes MD  Pulmonary/Critical Care Fellow  [facility]

## 2023-10-10 NOTE — PROGRESS NOTES
Heart Failure/ Pulmonary Hypertension Progress Note - Cuong Salazar 66 y.o. male MRN: [id number]    Unit/Bed#: -01 Encounter: [id number]      Assessment:    Principal Problem:    Acute on chronic diastolic congestive heart failure (HCC)  Active Problems:    Type 2 diabetes mellitus, without long-term current use of insulin (HCC)    Morbid obesity due to excess calories (HCC)    VICKY (obstructive sleep apnea)    Paroxysmal atrial fibrillation (HCC)    Stage 3a chronic kidney disease (HCC)    Severe persistent asthma without complication    Nasal congestion      Subjective:   67 yo male with HFimpEF with predominant HFpEF now with multiple admits for volume overload. He is on high dose bumex 5 mg BID as outpt with prn metolazone. His morbid obesity complicates his treatment with BMI 46. Weight on admission up to 349 lbs (hospital records show weight around 314 lbs during an admit in 2022). Reported more SOB and more edema in legs - metolazone not of help as outpt. Has CardioMems, goal PAD 15. PAD 16 10/6, 14 10/5. Patient seen and examined. No significant events overnight. Feels almost back to baseline. Objective: Intake/ Output: 1020/1830/-810  Weight: 335, 343, 349 on admit. Tele: SR  MAPs: 80-90 mmHg. Plan:  Acute on chronic HFpEF, Stage C, NYHA III. Difficult exam due to body habitus though appears fairly well compensated. Consider transition to oral regimen. Replace K. Possible repeat RHC with Cardiomems recalibration in AM    Volume management :   Inpatient: Bumex 5 mg IV BID  Home regimen: Bumex 5 mg PO BID, Jardiance 10 mg daily, Spironolactone 25 mg daily, Metolazone 2.5 mg PRN    s/p CardioMems implant 3/9/22, GOAL PAD 15-18  PAD 16 10/6, 14 10/5.      BNP 10/6/23: 93  122  233      Studies      LHC 9/6/22: No obstructive CAD     Pharm Nuc Stress 9/2/22: Abnormal study due to the presence of an inferior and inferolateral infarct without significant ischemia.     RHC 3/9/22:   RA 4   RV 35/4   PA 35/12/21   PCWP 10   PA sat 64%   Travis CO 5.56 L/min   Travis CI 2.2L/min/m2   PVR 1.97 SAGASTUME      TTE 4/11/2023: LVEF 50%. Normal wall motion. Grade 2 DD. RV cavity size and systolic function normal.  LA mildly dilated. Trace MR, TR. Normal IVC. TTE 03/25/2020: LVEF 40-45%. LVIDd 6.12 cm. Normal RV.    TTE 07/19/2021: LVEF 50%. LVIDd 6.13 cm. Grade 1 DD. Normal RV. Trace MR and TR. Mildly dilated IVC.    TTE 11/12/2021: LVEF 55%. LVIDd 6.0 cm. Grade 1 DD. Normal RV. Trace TR.           Atrial fibrillation   [id number] = 3 (HF, HTN, DM).    Diagnosed 02/2022.   Anticoagulation on Eliquis.    Rate control: BB as above.   Rhythm control: No.      Hypertension, controlled   Rx: amlodipine 5mg daily      Hyperlipidemia   11/22/22:   HDL 55 LDL 85  Rx: atorvastatin 10 mg daily.         Diabetes mellitus, type II     Non-insulin dependent.   HbA1c 8/9/23: 7.1%      Obstructive sleep apnea  Not currently using CPAP given machine recall. Chronic respiratory failure   Asthma, moderate persistent    S/p gastric bypass (Samuel-en-Y)surgery    History of tobacco abuse   Carpal tunnel syndrome, bilateral    CKD, recent baseline 1.5-2.0. Stable. Review of Systems   All other systems reviewed and are negative. \[facility]\"" Financial (day, reason): Cotton catheter (day, reason):    Vitals: Blood pressure 136/70, pulse 74, temperature 97.9 °F (36.6 °C), resp. rate 16, height 6' 1" (1.854 m), weight (!) 152 kg (335 lb 1.6 oz), SpO2 95 %. , Body mass index is 44.21 kg/m²., I/O last 3 completed shifts: In: 1260 [P.O.:1260]  Out: 8019 [Urine:2830]  No intake/output data recorded. Wt Readings from Last 3 Encounters:   10/10/23 (!) 152 kg (335 lb 1.6 oz)   10/05/23 (!) 158 kg (349 lb)   08/23/23 (!) 157 kg (346 lb)       Intake/Output Summary (Last 24 hours) at 10/10/2023 0814  Last data filed at 10/9/2023 2024  Gross per 24 hour   Intake 540 ml   Output 1230 ml   Net -690 ml     I/O last 3 completed shifts:   In: 1260 [P.O.:1260]  Out: 9595 [Urine:2830]    No significant arrhythmias seen on telemetry review.        Physical Exam:  Vitals:    10/09/23 2315 10/10/23 0249 10/10/23 0600 10/10/23 0714   BP: 117/69 115/69  136/70   BP Location:       Pulse: 82 74  74   Resp: 16 16     Temp: 98.4 °F (36.9 °C) 98.2 °F (36.8 °C)  97.9 °F (36.6 °C)   TempSrc:       SpO2: 93% 94%  95%   Weight:   (!) 152 kg (335 lb 1.6 oz)    Height:           GEN: Tera Castillo appears well, alert and oriented x 3, pleasant and cooperative   HEENT: pupils equal, round, and reactive to light; extraocular muscles intact  NECK: supple, no carotid bruits   HEART: regular rhythm, normal S1 and S2, no murmurs, clicks, gallops or rubs, no JVD  LUNGS: clear to auscultation bilaterally; no wheezes, rales, or rhonchi   ABDOMEN: normal bowel sounds, soft, no tenderness, no distention  EXTREMITIES: peripheral pulses normal; no clubbing, cyanosis, or edema  NEURO: no focal findings   SKIN: normal without suspicious lesions on exposed skin      Current Facility-Administered Medications:   •  albuterol (PROVENTIL HFA,VENTOLIN HFA) inhaler 2 puff, 2 puff, Inhalation, Q4H PRN, Della Manuel, DO  •  apixaban (ELIQUIS) tablet 5 mg, 5 mg, Oral, BID, Della Manuel, DO, 5 mg at 10/09/23 1726  •  atorvastatin (LIPITOR) tablet 10 mg, 10 mg, Oral, Daily, Della Manuel, DO, 10 mg at 10/09/23 0813  •  budesonide-formoterol (SYMBICORT) 160-4.5 mcg/act inhaler 2 puff, 2 puff, Inhalation, BID, Della Manuel, DO, 2 puff at 10/09/23 1727  •  bumetanide (BUMEX) injection 5 mg, 5 mg, Intravenous, BID, Jessica John, DO, 5 mg at 10/09/23 1727  •  Empagliflozin (JARDIANCE) tablet 10 mg, 10 mg, Oral, Daily, Della Jackson DO, 10 mg at 10/09/23 0815  •  insulin lispro (HumaLOG) 100 units/mL subcutaneous injection 1-6 Units, 1-6 Units, Subcutaneous, HS, Della Jackson DO, 2 Units at 10/09/23 2135  •  insulin lispro (HumaLOG) 100 units/mL subcutaneous injection 2-12 Units, 2-12 Units, Subcutaneous, TID AC, 4 Units at 10/09/23 1726 **AND** Fingerstick Glucose (POCT), , , TID AC, Janette Siemens, DO  •  melatonin tablet 6 mg, 6 mg, Oral, HS, Joe Choe MD, 6 mg at 10/09/23 2135  •  metoprolol succinate (TOPROL-XL) 24 hr tablet 50 mg, 50 mg, Oral, Daily, Janette Siemens, DO, 50 mg at 10/09/23 0813  •  montelukast (SINGULAIR) tablet 10 mg, 10 mg, Oral, HS, Janette Siemens, DO, 10 mg at 10/09/23 2135  •  pantoprazole (PROTONIX) EC tablet 40 mg, 40 mg, Oral, Daily, Janette Siemens, DO, 40 mg at 10/10/23 0606  •  potassium chloride (K-DUR,KLOR-CON) CR tablet 40 mEq, 40 mEq, Oral, BID, Tommie Gray PA-C, 40 mEq at 10/09/23 1726  •  pregabalin (LYRICA) capsule 50 mg, 50 mg, Oral, HS, Janette Siemens, DO, 50 mg at 10/09/23 2135  •  spironolactone (ALDACTONE) tablet 25 mg, 25 mg, Oral, Daily, Janette Siemens, DO, 25 mg at 10/09/23 0813  •  umeclidinium 62.5 mcg/actuation inhaler AEPB 1 puff, 1 puff, Inhalation, Daily, Joe Choe MD, 1 puff at 10/09/23 0814      Labs & Results:        Results from last 7 days   Lab Units 10/10/23  0604 10/09/23  0528 10/07/23  0544   WBC Thousand/uL 8.47 7.73 8.09   HEMOGLOBIN g/dL 10.5* 10.1* 9.8*   HEMATOCRIT % 33.9* 32.7* 32.3*   PLATELETS Thousands/uL 277 257 249         Results from last 7 days   Lab Units 10/10/23  0604 10/09/23  0528 10/08/23  0528 10/07/23  0544 10/06/23  1803   POTASSIUM mmol/L 3.4* 3.3* 3.2*   < > 3.6   CHLORIDE mmol/L 95* 94* 95*   < > 101   CO2 mmol/L 30 31 31   < > 25   BUN mg/dL 32* 29* 26*   < > 18   CREATININE mg/dL 1.84* 1.91* 1.75*   < > 1.23   CALCIUM mg/dL 8.1* 8.2* 8.2*   < > 7.8*   ALK PHOS U/L 94 90  --   --  105*   ALT U/L 124* 88*  --   --  69*   AST U/L 213* 170*  --   --  52*    < > = values in this interval not displayed. Counseling / Coordination of Care  Total floor / unit time spent today 20 minutes.   Greater than 50% of total time was spent with the patient and / or family counseling and / or coordination of care. A description of the counseling / coordination of care: 20. Thank you for the opportunity to participate in the care of this patient. Capri Brown

## 2023-10-10 NOTE — APP STUDENT NOTE
MOLLY STUDENT  Inpatient Progress Note for TRAINING ONLY  Not Part of Legal Medical Record     Progress Note - Francis Carlson 66 y.o. male MRN: [id number]  Unit/Bed#: -01 Encounter: [id number]    Acute on chronic diastolic heart failure: Cardiology following. Planning for RHC to recalibrate his cardiomems tomorrow morning. Patient currently on Bumex 5mg BID IV. Home regimens remain unchanged. Continue metoprolol 50mg daily, empagliflozin 10mg daily, spironolactone 25mg daily. Fluid restriction < 2L, I/O's, daily weights  Low sodium 2g diet  Echocardiogram 04/11/23 EF 53%, grade 2 systolic dysfunction  Monitor electrolytes and kidney function with daily BMP    Stage 3a CKD: Baseline Cr is 10.-2.0. Monitor kidney function with daily BMP. Type 2 DM: Continue Jardiance. Currently on SSI. Monitor accuchecks. Carbohydrate diet. Hypoglycemia protocol. Severe persistent asthma: Well controlled on albuterol 2 puffs daily and umeclidnium 1 puff daily. Nasal Congestion: Start loratadine 10mg and fluticasone 50mcg/act nasal spray daily. VICKY: Patient waiting on CPAP machine. Follow up with PCP. Morbid obesity: Samuel en y surgery performed in 2004. Is considering discussion with bariatric surgery. Diet and lifestyle modifications discussed. VTE Pharmacologic Prophylaxis:   Pharmacologic: Apixaban (Eliquis)  Mechanical VTE Prophylaxis in Place: Yes    Patient Centered Rounds: I have performed bedside rounds with nursing staff today. Discussions with Specialists or Other Care Team Provider: ***    Education and Discussions with Family / Patient: ***    Time Spent for Care: 30 minutes. More than 50% of total time spent on counseling and coordination of care as described above.     Current Length of Stay: 4 day(s)    Current Patient Status: Inpatient   Certification Statement: The patient will continue to require additional inpatient hospital stay due to cardiomems recalibration with cardiology    Discharge Plan: ***    Code Status: Level 1 - Full Code    Subjective:   PATTI is a 66year old male with PMH of diastolic congestive heart failure, stage 3 CKD, type 2 DM, severe persistent asthma, PAF, VICKY, and morbid obesity on hospital day day 4. Patients SOB has improved since admission. Patient is no longer SOB at rest. He has TELLES during his walks once a day. He has associated orthopnea and PND. This is relieved with the head of bed elevated. He has lost 14 pounds since admission on (10/06/23). His bowel and urination pattern is regular. He is tolerating food and drink. Patient states he had an episode of forearm and hand cramping yesterday morning. He denies any new episodes. Patient states his congestion improved with Afrin nasal spray this morning. He plans to take Claritin and fluticasone today. Patient denies CP, palpitations, dizziness, fever, chills, sore throat. Objective:     Vitals:   Temp (24hrs), Av.3 °F (36.8 °C), Min:97.9 °F (36.6 °C), Max:98.8 °F (37.1 °C)    Temp:  [97.9 °F (36.6 °C)-98.8 °F (37.1 °C)] 97.9 °F (36.6 °C)  HR:  [74-87] 74  Resp:  [16-20] 16  BP: (115-136)/(69-73) 136/70  SpO2:  [93 %-95 %] 95 %  Body mass index is 44.21 kg/m². Input and Output Summary (last 24 hours): Intake/Output Summary (Last 24 hours) at 10/10/2023 1117  Last data filed at 10/10/2023 1031  Gross per 24 hour   Intake 780 ml   Output 1650 ml   Net -870 ml       Physical Exam:     Physical Exam  Constitutional:       Appearance: Normal appearance. He is obese. HENT:      Head: Normocephalic and atraumatic. Nose: Congestion present. Mouth/Throat:      Mouth: Mucous membranes are moist.   Neck:      Comments: No JVD. Cardiovascular:      Rate and Rhythm: Normal rate and regular rhythm. Heart sounds: Normal heart sounds.       Comments: Pulses difficult to assess with LE edema  Pulmonary:      Effort: Pulmonary effort is normal.      Breath sounds: Normal breath sounds. Comments: CTA b/l no wheezes, rales, rhonci  Musculoskeletal:      Cervical back: Normal range of motion. Right lower leg: Edema present. Left lower leg: Edema present. Skin:     General: Skin is warm and dry. Capillary Refill: Capillary refill takes less than 2 seconds. Neurological:      Mental Status: He is alert. Psychiatric:         Mood and Affect: Mood normal.       ( *** Be Sure to Include Physical Exam: Delete this entire line when you have entered your exam)    Historical Information   Past Medical History:   Diagnosis Date   • Acute gastritis without hemorrhage     last assessed 3/24/17   • Asthma    • Atrial fibrillation ([address])    • Bilateral leg edema 02/19/2020   • CHF (congestive heart failure) ([facility])    • Coronary artery disease    • Daytime sleepiness 07/17/2019   • Diabetes mellitus ([address])    • Dyspnea on exertion 10/11/2021   • Edema of both feet 01/23/2020   • Gastric bypass status for obesity    • Hyperlipidemia    • Hypertension    • Hypokalemia 11/14/2021   • Impaired fasting glucose     last assessed 5/10/17   • Knee sprain, bilateral 06/14/2018   • Leg cramps 06/16/2022   • Obesity    • Viral gastroenteritis     last assessed 11/4/16     Past Surgical History:   Procedure Laterality Date   • CARDIAC CATHETERIZATION N/A 3/9/2022    Procedure: CARDIAC RHC W/ PRESSURE SENSOR;  Surgeon: María Palafox MD;  Location: [facility]; Service: Cardiology   • CARDIAC CATHETERIZATION N/A 9/6/2022    Procedure: Cardiac catheterization;  Surgeon: Lourdes Chaves DO;  Location: [facility]; Service: Cardiology   • CARDIAC CATHETERIZATION N/A 9/6/2022    Procedure: Cardiac Coronary Angiogram;  Surgeon: Lourdes Chaves DO;  Location: [facility];   Service: Cardiology   • CARPAL TUNNEL RELEASE      bilateral   • GASTRIC BYPASS  2004    with han en y   • HERNIA REPAIR      ventral inscisional   • TONSILLECTOMY       Social History Social History     Substance and Sexual Activity   Alcohol Use Yes   • Alcohol/week: 2.0 standard drinks of alcohol   • Types: 2 Shots of liquor per week    Comment: social     Social History     Substance and Sexual Activity   Drug Use No     Social History     Tobacco Use   Smoking Status Former   • Types: Pipe, Cigars   • Start date:    • Quit date: 2021   • Years since quittin.7   Smokeless Tobacco Never   Tobacco Comments    cigar once a day     Family History: {[facility] FAM HISTORY SMARTLIST:[id number]}    Meds/Allergies   {[facility] H&P IESN:[id number]}  Allergies   Allergen Reactions   • Azithromycin Other (See Comments)     Shaky, uneasy feeling    • Bactrim [Sulfamethoxazole-Trimethoprim] Other (See Comments)     shakiness       Additional Data:     Labs:    Results from last 7 days   Lab Units 10/10/23  0604 10/09/23  0528   WBC Thousand/uL 8.47 7.73   HEMOGLOBIN g/dL 10.5* 10.1*   HEMATOCRIT % 33.9* 32.7*   PLATELETS Thousands/uL 277 257   NEUTROS PCT %  --  59   LYMPHS PCT %  --  13*   MONOS PCT %  --  8   EOS PCT %  --  18*     Results from last 7 days   Lab Units 10/10/23  0604   SODIUM mmol/L 136   POTASSIUM mmol/L 3.4*   CHLORIDE mmol/L 95*   CO2 mmol/L 30   BUN mg/dL 32*   CREATININE mg/dL 1.84*   ANION GAP mmol/L 11   CALCIUM mg/dL 8.1*   ALBUMIN g/dL 3.7   TOTAL BILIRUBIN mg/dL 0.86   ALK PHOS U/L 94   ALT U/L 124*   AST U/L 213*   GLUCOSE RANDOM mg/dL 180*         Results from last 7 days   Lab Units 10/10/23  0610 10/09/23  2038 10/09/23  1709 10/09/23  1202 10/09/23  0807 10/08/23  2114 10/08/23  1643 10/08/23  1115 10/08/23  0708 10/07/23  2049 10/07/23  1638 10/07/23  1200   POC GLUCOSE mg/dl 176* 224* 234* 232* 180* 228* 196* 234* 175* 228* 167* 210*                 * I Have Reviewed All Lab Data Listed Above.   * Additional Pertinent Lab Tests Reviewed: {Labreview:04424}    Imaging:    Imaging Reports Reviewed Today Include: ***  Imaging Personally Reviewed by Myself Includes: ***    Recent Cultures (last 7 days):           Last 24 Hours Medication List:   Current Facility-Administered Medications   Medication Dose Route Frequency Provider Last Rate   • albuterol  2 puff Inhalation Q4H PRN Jonathon Montalvoer, DO     • atorvastatin  10 mg Oral Daily Jonathon [place], DO     • budesonide-formoterol  2 puff Inhalation BID Jonathon [place], DO     • bumetanide  5 mg Intravenous BID Shelia Anglin, DO     • Empagliflozin  10 mg Oral Daily Jonathon [place], DO     • fluticasone  1 spray Each Nare BID Ilda Carlton PA-C     • insulin lispro  1-6 Units Subcutaneous HS Jonathon [place], DO     • insulin lispro  2-12 Units Subcutaneous TID AC Jonathon Douglas, DO     • loratadine  10 mg Oral Daily Ilda Carlton PA-C     • melatonin  6 mg Oral HS Joe Kilgore MD     • metoprolol succinate  50 mg Oral Daily Jonathon [place], DO     • montelukast  10 mg Oral HS Jonathon [place], DO     • pantoprazole  40 mg Oral Daily Jonathon [place], DO     • potassium chloride  40 mEq Oral BID Clive Ballesteros PA-C     • potassium chloride  40 mEq Oral Once [employer], CRNP     • pregabalin  50 mg Oral HS Jonathon [place], DO     • spironolactone  25 mg Oral Daily Jonathon [place], DO     • umeclidinium  1 puff Inhalation Daily Joe Kilgore MD          Today, Patient Was Seen By: Robin Ceja    ** Please Note: Dictation voice to text software may have been used in the creation of this document.  **

## 2023-10-10 NOTE — PROGRESS NOTES
[address] [place] [address]  Progress Note  Name: Whitney Rooney  MRN: [id number]  Unit/Bed#: -31 I Date of Admission: 10/6/2023   Date of Service: 10/10/2023 I Hospital Day: 4    Assessment/Plan   * Acute on chronic diastolic congestive heart failure [facility])  Assessment & Plan  Wt Readings from Last 3 Encounters:   10/10/23 (!) 152 kg (335 lb 1.6 oz)   10/05/23 (!) 158 kg (349 lb)   08/23/23 (!) 157 kg (346 lb)     Presenting with worsening shortness of breath, even at rest on admission. Reported increased lower extremity edema and at least 7 pound weight gain over the past 3-4 days prior to admission, despite dosing of metolazone. · Echocardiogram 4/11/2023 EF 32%, grade 2 diastolic dysfunction  · Home dose diuretic: Bumex 5 mg twice daily   · Heart failure service consult for further diuretic recommendations, patient follows with Dr. Samantha Treviño.    · Currently on IV Bumex 5 mg BID  · Possible repeat RHC with Cardiomems recalibration in AM  · Continued on remainder of home cardiac regimen: Toprol-XL 50 mg daily, spironolactone 25 mg daily, Jardiance 10 mg daily   · Monitor daily weights, I/O, volume status  · Low-sodium, fluid strict diet   · Replace electrolytes as needed and monitor renal function    Stage 3a chronic kidney disease [facility])  Assessment & Plan  Lab Results   Component Value Date    EGFR 40 10/10/2023    EGFR 38 10/09/2023    EGFR 42 10/08/2023    CREATININE 1.84 (H) 10/10/2023    CREATININE 1.91 (H) 10/09/2023    CREATININE 1.75 (H) 10/08/2023   · baseline creatinine approximately 1.0-2.0  · Currently on IV diuresis as per heart failure service as above   · Monitor with BMP    Type 2 diabetes mellitus, without long-term current use of insulin [facility])  Assessment & Plan  Lab Results   Component Value Date    HGBA1C 7.1 (A) 08/09/2023       Recent Labs     10/09/23  1202 10/09/23  1709 10/09/23  2038 10/10/23  0610   POCGLU 232* 234* 224* 176*       Blood Sugar Average: Last 72 hrs:  · (P) [id number]   · Reasonably well controlled as outpatient  · Continue with Jardiance given heart failure, hold other oral medications. · Currently on SSI coverage alone   · Monitor accuchecks and adjust as needed   · Carb controlled diet  · Hypoglycemia protocol    Severe persistent asthma without complication  Assessment & Plan  · Does not appear in acute exacerbation  · Continue home inhalers/nebulizers/Singulair    Nasal congestion  Assessment & Plan  Patient reporting nasal congestion; denies cough or fevers   · COVID-19 PCR negative   · Add Flonase and Claritin     Paroxysmal atrial fibrillation (HCC)  Assessment & Plan  · Rate controlled on metoprolol succinate, continue at home dosing  · Continue anticoagulation with Eliquis    VICKY (obstructive sleep apnea)  Assessment & Plan  · Not currently on CPAP as patient awaiting new device (current device recalled)  · Advised follow-up with prescribing physician    Morbid obesity due to excess calories ([address])  Assessment & Plan  · Dietary and lifestyle modification advised. Morbid obesity complicates all facets of care         VTE Pharmacologic Prophylaxis: VTE Score: 6 High Risk (Score >/= 5) - Pharmacological DVT Prophylaxis Ordered: apixaban (Eliquis). Sequential Compression Devices Ordered. Patient Centered Rounds: I performed bedside rounds with nursing staff today. Discussions with Specialists or Other Care Team Provider: primary RN, case management, will f/u heart failure recs     Education and Discussions with Family / Patient: Patient declined call to . Total Time Spent on Date of Encounter in care of patient: 30 mins.  This time was spent on one or more of the following: performing physical exam; counseling and coordination of care; obtaining or reviewing history; documenting in the medical record; reviewing/ordering tests, medications or procedures; communicating with other healthcare professionals and discussing with patient's family/caregivers. Current Length of Stay: 4 day(s)  Current Patient Status: Inpatient   Certification Statement: The patient will continue to require additional inpatient hospital stay due to IV diuresis, pending cardiology clearance  Discharge Plan: Anticipate discharge in 24-48 hrs to home. Code Status: Level 1 - Full Code    Subjective:   Patient offers no new complaints today. Still with some increased TELLES and also still with nasal congestion. States the Afrin helped when he received it. He denies issues with allergies in the past, but agreeable to trying Claritin. He denies cough or wheezing. Objective:     Vitals:   Temp (24hrs), Av.3 °F (36.8 °C), Min:97.9 °F (36.6 °C), Max:98.8 °F (37.1 °C)    Temp:  [97.9 °F (36.6 °C)-98.8 °F (37.1 °C)] 97.9 °F (36.6 °C)  HR:  [74-87] 74  Resp:  [16-20] 16  BP: (115-136)/(69-73) 136/70  SpO2:  [93 %-95 %] 95 %  Body mass index is 44.21 kg/m². Input and Output Summary (last 24 hours): Intake/Output Summary (Last 24 hours) at 10/10/2023 1045  Last data filed at 10/10/2023 1031  Gross per 24 hour   Intake 780 ml   Output 1650 ml   Net -870 ml       Physical Exam:   Physical Exam  Vitals reviewed. Constitutional:       Appearance: He is morbidly obese. Cardiovascular:      Rate and Rhythm: Normal rate. Pulmonary:      Effort: Pulmonary effort is normal. No respiratory distress. Breath sounds: Decreased breath sounds present. No wheezing, rhonchi or rales. Musculoskeletal:      Right lower leg: Edema present. Left lower leg: Edema present. Skin:     General: Skin is dry. Coloration: Skin is not pale. Findings: No erythema. Neurological:      Mental Status: He is alert and oriented to person, place, and time.           Additional Data:     Labs:  Results from last 7 days   Lab Units 10/10/23  0604 10/09/23  0528   WBC Thousand/uL 8.47 7.73   HEMOGLOBIN g/dL 10.5* 10.1*   HEMATOCRIT % 33.9* 32.7*   PLATELETS Thousands/uL 277 257   NEUTROS PCT %  --  59   LYMPHS PCT %  --  13*   MONOS PCT %  --  8   EOS PCT %  --  18*     Results from last 7 days   Lab Units 10/10/23  0604   SODIUM mmol/L 136   POTASSIUM mmol/L 3.4*   CHLORIDE mmol/L 95*   CO2 mmol/L 30   BUN mg/dL 32*   CREATININE mg/dL 1.84*   ANION GAP mmol/L 11   CALCIUM mg/dL 8.1*   ALBUMIN g/dL 3.7   TOTAL BILIRUBIN mg/dL 0.86   ALK PHOS U/L 94   ALT U/L 124*   AST U/L 213*   GLUCOSE RANDOM mg/dL 180*         Results from last 7 days   Lab Units 10/10/23  0610 10/09/23  2038 10/09/23  1709 10/09/23  1202 10/09/23  0807 10/08/23  2114 10/08/23  1643 10/08/23  1115 10/08/23  0708 10/07/23  2049 10/07/23  1638 10/07/23  1200   POC GLUCOSE mg/dl 176* 224* 234* 232* 180* 228* 196* 234* 175* 228* 167* 210*               Lines/Drains:  Invasive Devices     Peripheral Intravenous Line  Duration           Peripheral IV 10/10/23 Dorsal (posterior); Left Forearm <1 day                  Telemetry:  Telemetry Orders (From admission, onward)             24 Hour Telemetry Monitoring  Continuous x 24 Hours (Telem)        Expiring   Question:  Reason for 24 Hour Telemetry  Answer:  Decompensated CHF- and any one of the following: continuous diuretic infusion or total diuretic dose >200 mg daily, associated electrolyte derangement (I.e. K < 3.0), ionotropic drip (continuous infusion), hx of ventricular arrhythmia, or new EF < 35%                 Telemetry Reviewed: Normal Sinus Rhythm  Indication for Continued Telemetry Use: Acute CHF on >200 mg lasix/day or equivalent dose or with new reduced EF. Imaging: No pertinent imaging reviewed.     Recent Cultures (last 7 days):         Last 24 Hours Medication List:   Current Facility-Administered Medications   Medication Dose Route Frequency Provider Last Rate   • albuterol  2 puff Inhalation Q4H PRN Swink Preston, DO     • apixaban  5 mg Oral BID Swink Preston, DO     • atorvastatin  10 mg Oral Daily Swink Preston, DO     • budesonide-formoterol  2 puff Inhalation BID Juanito Bowman, DO     • bumetanide  5 mg Intravenous BID Tiarra Myers, DO     • Empagliflozin  10 mg Oral Daily Juanito Bowman, DO     • fluticasone  1 spray Each Nare BID Ilda Carlton PA-C     • insulin lispro  1-6 Units Subcutaneous HS Juanito Bowman, DO     • insulin lispro  2-12 Units Subcutaneous TID AC Juanito Bowman, DO     • loratadine  10 mg Oral Daily Ilda Carlton PA-C     • melatonin  6 mg Oral HS Joe Kathleen MD     • metoprolol succinate  50 mg Oral Daily Juanito Bowman, DO     • montelukast  10 mg Oral HS Juanito Bowman, DO     • pantoprazole  40 mg Oral Daily Juanito Bowman, DO     • potassium chloride  40 mEq Oral BID Carrie Salas PA-C     • potassium chloride  40 mEq Oral Once [employer], CRNP     • pregabalin  50 mg Oral HS Juanito Bowman, DO     • spironolactone  25 mg Oral Daily Juanito Bowman DO     • umeclidinium  1 puff Inhalation Daily Joe Kathleen MD          Today, Patient Was Seen By: Lakisha Bright PA-C    **Please Note: This note may have been constructed using a voice recognition system. **

## 2023-10-10 NOTE — CASE MANAGEMENT
Case Management Assessment    Patient name Belkis Pike  Location /-67 MRN [id number]  : 1965 Date 10/10/2023       Current Admission Date: 10/6/2023  Current Admission Diagnosis:Acute on chronic diastolic congestive heart failure [facility])   Patient Active Problem List    Diagnosis Date Noted   • Nasal congestion 10/06/2023   • Severe persistent asthma without complication    • Loose stools 2023   • Acute on chronic diastolic congestive heart failure ([address]) 04/10/2023   • Diabetic polyneuropathy associated with type 2 diabetes mellitus ([address]) 2022   • Stage 3a chronic kidney disease ([address]) 2022   • Presence of CardioMEMS HF system 2022   • Paroxysmal atrial fibrillation ([address]) 2022   • Chronic heart failure with preserved ejection fraction (HFpEF) ([address]) 2021   • Severe persistent asthma 10/11/2021   • HNP (herniated nucleus pulposus), lumbar 2021   • Foraminal stenosis of lumbar region 2021   • VICKY (obstructive sleep apnea)    • Mixed hyperlipidemia 2019   • Primary osteoarthritis of both knees 2018   • Irritable bowel syndrome with diarrhea 2018   • Primary hypertension 2018   • Type 2 diabetes mellitus, without long-term current use of insulin ([address]) 2018   • Vitamin B12 deficiency 2016   • Vitamin D deficiency 2016   • Morbid obesity due to excess calories ([address]) 2016   • Primary osteoarthritis of right knee 10/15/2013      LOS (days): 4  Geometric Mean LOS (GMLOS) (days):   Days to GMLOS:     OBJECTIVE:    Risk of Unplanned Readmission Score: 25.65         Current admission status: Inpatient       Preferred Pharmacy:   [address] [place] [address] [address] [address]  Phone: [phone] Fax: [phone]    Primary Care Provider: Kerrie Broussard MD    Primary Insurance: [facility]  Secondary Insurance:     ASSESSMENT:  [address] [place], [address] Guerra Representative - Spouse   Primary Phone: [phone] [facility])  Mobile Phone: [phone]               Readmission Root Cause  30 Day Readmission: No    Patient Information  Admitted from[de-identified] Home  Mental Status: Alert  During Assessment patient was accompanied by: Not accompanied during assessment  Assessment information provided by[de-identified] Patient  Primary Caregiver: Self  Support Systems: Spouse/significant other  [facility]: [address] [place] [address] do you live in?: [facility] entry access options.  Select all that apply.: Stairs  Number of steps to enter home.: 5  Do the steps have railings?: No  Type of Current Residence: 2 story home  Upon entering residence, is there a bedroom on the main floor (no further steps)?: No  A bedroom is located on the following floor levels of residence (select all that apply):: 2nd Floor  Upon entering residence, is there a bathroom on the main floor (no further steps)?: No  Indicate which floors of current residence have a bathroom (select all the apply):: 2nd Floor  Number of steps to 2nd floor from main floor: One Flight  In the last 12 months, was there a time when you were not able to pay the mortgage or rent on time?: No  In the last 12 months, how many places have you lived?: 1  In the last 12 months, was there a time when you did not have a steady place to sleep or slept in a shelter (including now)?: No  Homeless/housing insecurity resource given?: N/A  Living Arrangements: Lives w/ Spouse/significant other  Is patient a ?: No    Activities of Daily Living Prior to Admission  Functional Status: Independent  Completes ADLs independently?: Yes  Ambulates independently?: Yes  Does patient use assisted devices?: Yes  Assisted Devices (DME) used: Straight Cane  Does patient currently own DME?: Yes  What DME does the patient currently own?: Straight Cane  Does patient have a history of Outpatient Therapy (PT/OT)?: No  Does the patient have a history of Short-Term Rehab?: No  Does patient have a history of HHC?: No  Does patient currently have [facility]?: No       Patient Information Continued  Does patient have prescription coverage?: Yes  Within the past 12 months, you worried that your food would run out before you got the money to buy more.: Never true  Within the past 12 months, the food you bought just didn't last and you didn't have money to get more.: Never true  Food insecurity resource given?: N/A  Does patient receive dialysis treatments?: No  Does patient have a history of substance abuse?: No  Does patient have a history of Mental Health Diagnosis?: No    PHQ 2/9 Screening   Reviewed PHQ 2/9 Depression Screening Score?: No    Means of Transportation  Means of Transport to Appts[de-identified] Drives Self  In the past 12 months, has lack of transportation kept you from medical appointments or from getting medications?: No  In the past 12 months, has lack of transportation kept you from meetings, work, or from getting things needed for daily living?: No  Was application for public transport provided?: N/A        Summary: Met with patient at bedside to complete initial assessment. Demographics confirmed. Lives with spouse. Independent with ADLs. Drives. No anticipated CM needs on DC. Patient states he will need a ride home. Typically takes the hospital shuttle.

## 2023-10-11 LAB
ALBUMIN SERPL BCP-MCNC: 3.6 G/DL (ref 3.5–5)
ALP SERPL-CCNC: 85 U/L (ref 34–104)
ALT SERPL W P-5'-P-CCNC: 128 U/L (ref 7–52)
ANION GAP SERPL CALCULATED.3IONS-SCNC: 9 MMOL/L
AST SERPL W P-5'-P-CCNC: 169 U/L (ref 13–39)
BILIRUB SERPL-MCNC: 0.82 MG/DL (ref 0.2–1)
BUN SERPL-MCNC: 34 MG/DL (ref 5–25)
CALCIUM SERPL-MCNC: 8.4 MG/DL (ref 8.4–10.2)
CHLORIDE SERPL-SCNC: 96 MMOL/L (ref 96–108)
CO2 SERPL-SCNC: 30 MMOL/L (ref 21–32)
CREAT SERPL-MCNC: 1.93 MG/DL (ref 0.6–1.3)
GFR SERPL CREATININE-BSD FRML MDRD: 37 ML/MIN/1.73SQ M
GLUCOSE SERPL-MCNC: 144 MG/DL (ref 65–140)
GLUCOSE SERPL-MCNC: 179 MG/DL (ref 65–140)
GLUCOSE SERPL-MCNC: 180 MG/DL (ref 65–140)
GLUCOSE SERPL-MCNC: 240 MG/DL (ref 65–140)
GLUCOSE SERPL-MCNC: 253 MG/DL (ref 65–140)
POTASSIUM SERPL-SCNC: 3.6 MMOL/L (ref 3.5–5.3)
PROT SERPL-MCNC: 6.6 G/DL (ref 6.4–8.4)
SODIUM SERPL-SCNC: 135 MMOL/L (ref 135–147)

## 2023-10-11 PROCEDURE — C1894 INTRO/SHEATH, NON-LASER: HCPCS | Performed by: STUDENT IN AN ORGANIZED HEALTH CARE EDUCATION/TRAINING PROGRAM

## 2023-10-11 PROCEDURE — 4A023N6 MEASUREMENT OF CARDIAC SAMPLING AND PRESSURE, RIGHT HEART, PERCUTANEOUS APPROACH: ICD-10-PCS | Performed by: FAMILY MEDICINE

## 2023-10-11 PROCEDURE — 82948 REAGENT STRIP/BLOOD GLUCOSE: CPT

## 2023-10-11 PROCEDURE — 93451 RIGHT HEART CATH: CPT | Performed by: STUDENT IN AN ORGANIZED HEALTH CARE EDUCATION/TRAINING PROGRAM

## 2023-10-11 PROCEDURE — C1751 CATH, INF, PER/CENT/MIDLINE: HCPCS | Performed by: STUDENT IN AN ORGANIZED HEALTH CARE EDUCATION/TRAINING PROGRAM

## 2023-10-11 PROCEDURE — 99232 SBSQ HOSP IP/OBS MODERATE 35: CPT | Performed by: STUDENT IN AN ORGANIZED HEALTH CARE EDUCATION/TRAINING PROGRAM

## 2023-10-11 PROCEDURE — 80053 COMPREHEN METABOLIC PANEL: CPT | Performed by: PHYSICIAN ASSISTANT

## 2023-10-11 PROCEDURE — 99232 SBSQ HOSP IP/OBS MODERATE 35: CPT | Performed by: INTERNAL MEDICINE

## 2023-10-11 RX ORDER — BUMETANIDE 2 MG/1
6 TABLET ORAL 2 TIMES DAILY
Status: DISCONTINUED | OUTPATIENT
Start: 2023-10-11 | End: 2023-10-12

## 2023-10-11 RX ORDER — LIDOCAINE HYDROCHLORIDE 10 MG/ML
INJECTION, SOLUTION EPIDURAL; INFILTRATION; INTRACAUDAL; PERINEURAL CODE/TRAUMA/SEDATION MEDICATION
Status: DISCONTINUED | OUTPATIENT
Start: 2023-10-11 | End: 2023-10-11 | Stop reason: HOSPADM

## 2023-10-11 RX ADMIN — METOPROLOL SUCCINATE 50 MG: 50 TABLET, EXTENDED RELEASE ORAL at 09:41

## 2023-10-11 RX ADMIN — ATORVASTATIN CALCIUM 10 MG: 10 TABLET, FILM COATED ORAL at 09:37

## 2023-10-11 RX ADMIN — INSULIN LISPRO 2 UNITS: 100 INJECTION, SOLUTION INTRAVENOUS; SUBCUTANEOUS at 09:40

## 2023-10-11 RX ADMIN — INSULIN LISPRO 5 UNITS: 100 INJECTION, SOLUTION INTRAVENOUS; SUBCUTANEOUS at 09:40

## 2023-10-11 RX ADMIN — INSULIN LISPRO 5 UNITS: 100 INJECTION, SOLUTION INTRAVENOUS; SUBCUTANEOUS at 17:10

## 2023-10-11 RX ADMIN — LORATADINE 10 MG: 10 TABLET ORAL at 09:40

## 2023-10-11 RX ADMIN — Medication 6 MG: at 21:19

## 2023-10-11 RX ADMIN — INSULIN LISPRO 5 UNITS: 100 INJECTION, SOLUTION INTRAVENOUS; SUBCUTANEOUS at 12:17

## 2023-10-11 RX ADMIN — POTASSIUM CHLORIDE 40 MEQ: 1500 TABLET, EXTENDED RELEASE ORAL at 17:09

## 2023-10-11 RX ADMIN — APIXABAN 5 MG: 5 TABLET, FILM COATED ORAL at 17:09

## 2023-10-11 RX ADMIN — BUMETANIDE 6 MG: 2 TABLET ORAL at 17:09

## 2023-10-11 RX ADMIN — APIXABAN 5 MG: 5 TABLET, FILM COATED ORAL at 12:17

## 2023-10-11 RX ADMIN — POTASSIUM CHLORIDE 40 MEQ: 1500 TABLET, EXTENDED RELEASE ORAL at 09:41

## 2023-10-11 RX ADMIN — FLUTICASONE PROPIONATE 1 SPRAY: 50 SPRAY, METERED NASAL at 17:09

## 2023-10-11 RX ADMIN — PANTOPRAZOLE SODIUM 40 MG: 40 TABLET, DELAYED RELEASE ORAL at 05:21

## 2023-10-11 RX ADMIN — SPIRONOLACTONE 25 MG: 25 TABLET ORAL at 09:41

## 2023-10-11 RX ADMIN — INSULIN LISPRO 6 UNITS: 100 INJECTION, SOLUTION INTRAVENOUS; SUBCUTANEOUS at 12:17

## 2023-10-11 RX ADMIN — FLUTICASONE PROPIONATE 1 SPRAY: 50 SPRAY, METERED NASAL at 09:39

## 2023-10-11 RX ADMIN — BUMETANIDE 5 MG: 0.25 INJECTION INTRAMUSCULAR; INTRAVENOUS at 09:38

## 2023-10-11 RX ADMIN — PREGABALIN 50 MG: 50 CAPSULE ORAL at 21:19

## 2023-10-11 RX ADMIN — BUDESONIDE AND FORMOTEROL FUMARATE DIHYDRATE 2 PUFF: 160; 4.5 AEROSOL RESPIRATORY (INHALATION) at 17:09

## 2023-10-11 RX ADMIN — EMPAGLIFLOZIN 10 MG: 10 TABLET, FILM COATED ORAL at 09:39

## 2023-10-11 RX ADMIN — UMECLIDINIUM 1 PUFF: 62.5 AEROSOL, POWDER ORAL at 09:42

## 2023-10-11 RX ADMIN — INSULIN LISPRO 3 UNITS: 100 INJECTION, SOLUTION INTRAVENOUS; SUBCUTANEOUS at 21:19

## 2023-10-11 RX ADMIN — MONTELUKAST 10 MG: 10 TABLET, FILM COATED ORAL at 21:19

## 2023-10-11 RX ADMIN — BUDESONIDE AND FORMOTEROL FUMARATE DIHYDRATE 2 PUFF: 160; 4.5 AEROSOL RESPIRATORY (INHALATION) at 09:38

## 2023-10-11 NOTE — PROGRESS NOTES
Progress Note - Pulmonary   Steffiariana Montgomery 66 y.o. 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 Chief Complaint:   I feel better    Subjective:   Patient states his breathing is better he still feels like he has fluid but no wheezing or cough    Objective:     Vitals: Blood pressure 141/85, pulse 79, temperature 98.4 °F (36.9 °C), resp. rate 17, height 6' 1" (1.854 m), weight (!) 155 kg (342 lb 12.8 oz), SpO2 94 %. ,Body mass index is 45.23 kg/m². Intake/Output Summary (Last 24 hours) at 10/11/2023 1256  Last data filed at 10/11/2023 1122  Gross per 24 hour   Intake 330 ml   Output 2000 ml   Net -1670 ml       Invasive Devices       Peripheral Intravenous Line  Duration             Peripheral IV 10/10/23 Dorsal (posterior); Left Forearm 1 day                    Physical Exam: /85   Pulse 79   Temp 98.4 °F (36.9 °C)   Resp 17   Ht 6' 1" (1.854 m)   Wt (!) 155 kg (342 lb 12.8 oz)   SpO2 94%   BMI 45.23 kg/m²   General appearance: alert and oriented, in no acute distress  Neck: no adenopathy, no carotid bruit, no JVD, supple, symmetrical, trachea midline, and thyroid not enlarged, symmetric, no tenderness/mass/nodules  Lungs: clear to auscultation bilaterally  Heart: regular rate and rhythm, S1, S2 normal, no murmur, click, rub or gallop  Abdomen: soft, non-tender; bowel sounds normal; no masses,  no organomegaly  Extremities: extremities normal, warm and well-perfused; no cyanosis, clubbing, or edema  Lymph nodes: Cervical, supraclavicular, and axillary nodes normal.  Neurologic: Grossly normal     Labs: CBC: No results found for: "WBC", "HGB", "HCT", "MCV", "PLT", "ADJUSTEDWBC", "RBC", "MCH", "MCHC", "RDW", "MPV", "NRBC", CMP:   Lab Results   Component Value Date    SODIUM 135 10/11/2023    K 3.6 10/11/2023    CL 96 10/11/2023    CO2 30 10/11/2023    BUN 34 (H) 10/11/2023    CREATININE 1.93 (H) 10/11/2023    CALCIUM 8.4 10/11/2023     (H) 10/11/2023     (H) 10/11/2023    ALKPHOS 85 10/11/2023    EGFR 37 10/11/2023     Imaging and other studies: I have personally reviewed pertinent films in PACS

## 2023-10-11 NOTE — PROGRESS NOTES
Pastoral Care Progress Note    10/11/2023  Patient: Martine Dean : 1965  Admission Date & Time: 10/6/2023 1728  MRN: [id number] CSN: [id number]         intro. Pt mention EMS in past and talked about health. Pt shared he had family support and was planning for discharge in two days.  remains available.              Chaplaincy Interventions Utilized:   Empowerment: Normalized experience of patient/family    Exploration: Explored relational needs & resources, Explored spiritual needs & resources, and Facilitated life review        Relationship Building: Cultivated a relationship of care and support and Listened empathically    Ritual: Celebrated with patient/family            Chaplaincy Outcomes Achieved:  Expressed gratitude, Expressed humor, Expressed intermediate hope, and Identified meaningful connections          Spiritual Coping Strategies Utilized:   Connectedness and No spiritual coping       10/11/23 1600   Clinical Encounter Type   Visited With Patient   Routine Visit Introduction

## 2023-10-11 NOTE — H&P
Advanced Heart Failure/Pulmonary Hypertension - Attending Note - Zoey Dewitt 66 y.o. male MRN: [id number]    Assessment:  66 y.o. male Hx per chart p/w volume overload despite recent cardiomems PAD readings near his target 15-18 range. Good Rx and diet compliance he says. HFimpEF, EF 50% now  s/p CardioMems implant 3/9/22    AF on [facility] 9/6/22: No obstructive CAD  # Hypertension  # Hyperlipidemia  # Diabetes mellitus, type II      # Obstructive sleep apnea , off CPAP x 1 year 2/2 recall  # S/p gastric bypass (Samuel-en-Y)surgery in 2007, pre surgical weight low 400lbs he says  Morbidly obese  # History of tobacco abuse    # Carpal tunnel syndrome, bilateral     # CKD  # Chronic respiratory failure    # Asthma, severe persistent     2021 PFTs:  Interpretation:  ? Severe obstructive airflow defect on spirometry  ? Significant improvement in airflow or forced vital capacity in response to the administration of bronchodilator per ATS standards. ? Air trapping as indicated by the lung volumes  ? Mild decrease in diffusion capacity  ?  Flow-volume loop consistent with obstruction      I reviewed all pertinent labs/imaging/data including but not limited to:    Temp:  [97.7 °F (36.5 °C)-98.1 °F (36.7 °C)] 97.7 °F (36.5 °C)  HR:  [74-86] 74  Resp:  [17-20] 17  BP: (121-144)/(52-93) 121/52     Weight (last 2 days)       Date/Time Weight    10/11/23 0546 155 (342.8)    10/10/23 0600 152 (335.1)    10/09/23 0529 156 (343.3)             Intake/Output Summary (Last 24 hours) at 10/11/2023 0732  Last data filed at 10/10/2023 2259  Gross per 24 hour   Intake 510 ml   Output 1435 ml   Net -925 ml       Results from last 7 days   Lab Units 10/11/23  0522 10/10/23  0604 10/09/23  0528   CREATININE mg/dL 1.93* 1.84* 1.91*     Lab Results   Component Value Date    K 3.6 10/11/2023     Lab Results   Component Value Date    HGBA1C 7.1 (A) 08/09/2023     Lab Results   Component Value Date    ZPT6SGFBAGBA 1.270 09/01/2022     Lab Results   Component Value Date    LDLCALC 86 10/06/2023     Lab Results   Component Value Date    BNP 93 10/06/2023      Lab Results   Component Value Date    NTBNP 697 (H) 04/10/2023                 Drips:        Cr BL may be 1.7 range    Plan:  Warm, near euvolemic by exam though limited by habitus  Feels slightly better so far    cw current IV diuresis regimen  Replete lytes aggressively  Strict IOs and weights    Plan for cardiomems calibration check with RHC 10/11/23    Key info from my prior notes:    Warm, near euvolemic by exam though limited by habitus  Feels slightly better with increased UO and weight loss since admission  Note discrepancy between clinical volume overload described on arrival and cardiomems near target range with recent PAD 15-18  Cr near his BL    His arrival weight was documented as 349lbs>>now 343lbs after diuresis  He states his best home dry weights have been 340lbs recently, though he was consistently 320s in recent years    Neurohormonal Blockade:    --Beta Blocker: metoprolol succinate 50 mg daily. --ARNi / ACEi / ARB: No.  --Aldosterone Antagonist: spironolactone 25 mg daily    --SGLT2 Inhibitor: Jardiance 10 mg     --prior home Diuretic: Bumex 5 mg BID, K 40 meq BID, prn metolazone      Sudden Cardiac Death Risk Reduction:    --LVEF >35%. Electrical Resynchronization:    --Candidacy for BiV device: narrow QRS. Advanced Therapies:  Will continue to monitor. LVEF recovered     Studies:  I have reviewed all pertinent patient data/labs/imaging where available, including but not limited to the below studies. Selected results may be displayed here but comprehensive listing is omitted for note clarity and can be found in the epic chart. ECG. Echo. Stress. Cath. Subjective and Interval Events:   Patient seen and examined. No new complaints. ROS:  10 point ROS negative except as specified above.     Tele: reviewed    Objective:     Physical Exam:  BP 121/52   Pulse 74   Temp 97.7 °F (36.5 °C) (Oral)   Resp 17   Ht 6' 1" (1.854 m)   Wt (!) 155 kg (342 lb 12.8 oz)   SpO2 94%   BMI 45.23 kg/m²   Ranges:  Temp:  [97.7 °F (36.5 °C)-98.1 °F (36.7 °C)] 97.7 °F (36.5 °C)  HR:  [74-86] 74  Resp:  [17-20] 17  BP: (121-144)/(52-93) 121/52    Weight (last 2 days)       Date/Time Weight    10/11/23 0546 155 (342.8)    10/10/23 0600 152 (335.1)    10/09/23 0529 156 (343.3)             Intake/Output Summary (Last 24 hours) at 10/11/2023 0732  Last data filed at 10/10/2023 2259  Gross per 24 hour   Intake 510 ml   Output 1435 ml   Net -925 ml     Constitutional: NAD, non toxic  Ears/nose/mouth/throat: atraumatic  CV: RRR, nl S1S2, no murmurs/rubs/gallups, no JVD, no HJR  Resp: Decreased breath sounds BL  GI: Soft, NTND  MSK: no swollen joints in exposed areas  Extr: No edema, warm LE  Pysche: Normal affect  Neuro: appropriate in conversation  Skin: dry and intact in exposed areas    Labs/Imaging/Data:   Results from last 7 days   Lab Units 10/10/23  0604 10/09/23  0528 10/07/23  0544 10/06/23  1803   WBC Thousand/uL 8.47 7.73 8.09 9.86   HEMOGLOBIN g/dL 10.5* 10.1* 9.8* 10.3*   HEMATOCRIT % 33.9* 32.7* 32.3* 33.1*   PLATELETS Thousands/uL 277 257 249 290   NEUTROS PCT %  --  59  --  60   MONOS PCT %  --  8  --  8   EOS PCT %  --  18*  --  16*      Results from last 7 days   Lab Units 10/11/23  0522 10/10/23  0604 10/09/23  0528 10/08/23  0528 10/07/23  0544 10/06/23  1803 10/06/23  1402   SODIUM mmol/L 135 136 137 137 136 137 135   POTASSIUM mmol/L 3.6 3.4* 3.3* 3.2* 3.8 3.6 4.4   CHLORIDE mmol/L 96 95* 94* 95* 98 101 96   CO2 mmol/L 30 30 31 31 27 25 26   ANION GAP mmol/L 9 11 12 11 11 11 13   BUN mg/dL 34* 32* 29* 26* 21 18 18   CREATININE mg/dL 1.93* 1.84* 1.91* 1.75* 1.43* 1.23 1.34*   CALCIUM mg/dL 8.4 8.1* 8.2* 8.2* 8.0* 7.8* 8.3*   GLUCOSE RANDOM mg/dL 180* 180* 185* 184* 162* 244*  --    ALT U/L 128* 124* 88*  --   --  69*  --    AST U/L 169* 213* 170*  --   -- 52*  --    ALK PHOS U/L 85 94 90  --   --  105*  --    ALBUMIN g/dL 3.6 3.7 3.6  --   --  3.6  --    TOTAL BILIRUBIN mg/dL 0.82 0.86 1.04*  --   --  0.48  --       No results found for: "LDH"       Current Facility-Administered Medications   Medication Dose Route Frequency Provider Last Rate    [MAR Hold] albuterol  2 puff Inhalation Q4H PRN Chillicothemeghan Cancino DO      [MAR Hold] atorvastatin  10 mg Oral Daily [facility]      [MAR Hold] budesonide-formoterol  2 puff Inhalation BID [facility] Hold] bumetanide  5 mg Intravenous BID [facility] Hold] Empagliflozin  10 mg Oral Daily [facility]       [MAR Hold] fluticasone  1 spray Each Nare BID ARTEMIO Gonzales-C      [facility] Hold] insulin lispro  1-6 Units Subcutaneous HS [facility] Hold] insulin lispro  2-12 Units Subcutaneous TID AC [facility] Hold] insulin lispro  5 Units Subcutaneous TID With Meals ARTMEIO Gonzales-C      [facility] Hold] loratadine  10 mg Oral Daily Ilda Carlton PA-C      [facility] Hold] melatonin  6 mg Oral HS Joe Diop MD      [facility] Hold] metoprolol succinate  50 mg Oral Daily Qianameghan Cancino DO      [MAR Hold] montelukast  10 mg Oral HS [facility] Hold] pantoprazole  40 mg Oral Daily [facility] Hold] potassium chloride  40 mEq Oral BID ALAYNA BarC      [facility] Hold] pregabalin  50 mg Oral HS Qiana rachel DO      [MAR Hold] spironolactone  25 mg Oral Daily Chillicothemeghan Cancino DO      [MAR Hold] umeclidinium  1 puff Inhalation Daily Herbert Sourav, MD         Counseling / Coordination of Care: Total floor / unit time spent today 30 minutes. Greater than 50% of total time was spent with the patient and / or family counseling and / or coordination of care. A description of the counseling / coordination of care: we discussed diagnoses, recent as well as older studies, labs, and all changes in cardiac treatment plan.  All patient questions were answered. Thank you for the opportunity to participate in the care of this patient.     Sherry Page MD  Attending Physician  Advanced Heart Failure and Transplant Cardiology  [address] [facility]

## 2023-10-11 NOTE — PROGRESS NOTES
[address] [facility]  Progress Note  Name: Diane Calderón  MRN: [id number]  Unit/Bed#: -34 I Date of Admission: 10/6/2023   Date of Service: 10/11/2023 I Hospital Day: 5    Assessment/Plan   * Acute on chronic diastolic congestive heart failure [facility])  Assessment & Plan  Wt Readings from Last 3 Encounters:   10/11/23 (!) 155 kg (342 lb 12.8 oz)   10/05/23 (!) 158 kg (349 lb)   08/23/23 (!) 157 kg (346 lb)     Presenting with worsening shortness of breath, even at rest on admission. Reported increased lower extremity edema and at least 7 pound weight gain over the past 3-4 days prior to admission, despite dosing of metolazone. Echocardiogram 4/11/2023 EF 32%, grade 2 diastolic dysfunction  Home dose diuretic: Bumex 5 mg twice daily   Heart failure service consult for further diuretic recommendations, patient follows with Dr. Sandy Corona.    Transitioned to increased dose PO Bumex 6 mg twice daily today  Status post RHC with calibration of cardiomems on 10/11   Continued on remainder of home cardiac regimen: Toprol-XL 50 mg daily, spironolactone 25 mg daily, Jardiance 10 mg daily   Monitor daily weights, I/O, volume status  Low-sodium, fluid strict diet   Replace electrolytes as needed and monitor renal function    Severe persistent asthma without complication  Assessment & Plan  Does not appear in acute exacerbation  Continue home inhalers/nebulizers/Singulair  Pulmonary inputs appreciated   PFTs reviewed and consistent with asthma/COPD overlap   Recommended adding Spiriva to regimen   Will need referral to pulmonary rehab on discharge   Outpatient follow-up scheduled    Stage 3a chronic kidney disease [facility])  Assessment & Plan  Lab Results   Component Value Date    EGFR 37 10/11/2023    EGFR 40 10/10/2023    EGFR 38 10/09/2023    CREATININE 1.93 (H) 10/11/2023    CREATININE 1.84 (H) 10/10/2023    CREATININE 1.91 (H) 10/09/2023   Baseline creatinine approximately 1.0-2.0  Diuresis per heart failure service as above   Monitor BMP    Type 2 diabetes mellitus, without long-term current use of insulin [facility])  Assessment & Plan  Lab Results   Component Value Date    HGBA1C 7.1 (A) 08/09/2023       Recent Labs     10/10/23  1700 10/10/23  2049 10/11/23  0614 10/11/23  1149   POCGLU 212* 266* 179* 253*       Blood Sugar Average: Last 72 hrs:  (P) 216.5111925480295471   Reasonably well controlled as outpatient  Continue with Jardiance given heart failure, hold other oral medications. Currently on 5 units Humalog with meals + SSI   Monitor accuchecks and adjust as needed   Carb controlled diet  Hypoglycemia protocol    Nasal congestion  Assessment & Plan  Patient reporting nasal congestion; denies cough or fevers   COVID-19 PCR negative   Added Flonase and Claritin     Paroxysmal atrial fibrillation (HCC)  Assessment & Plan  Rate controlled on metoprolol succinate, continue at home dosing  Continue anticoagulation with Eliquis    VICKY (obstructive sleep apnea)  Assessment & Plan  Not currently on CPAP as patient awaiting new device (current device recalled)  Advised follow-up with prescribing physician    Morbid obesity due to excess calories [facility])  Assessment & Plan  Dietary and lifestyle modification advised. Morbid obesity complicates all facets of care           VTE Pharmacologic Prophylaxis: VTE Score: 6 High Risk (Score >/= 5) - Pharmacological DVT Prophylaxis Ordered: apixaban (Eliquis). Sequential Compression Devices Ordered. Patient Centered Rounds: I performed bedside rounds with nursing staff today. Discussions with Specialists or Other Care Team Provider: case management, heart failure AP    Education and Discussions with Family / Patient: Patient declined call to . Total Time Spent on Date of Encounter in care of patient: 15 mins.  This time was spent on one or more of the following: performing physical exam; counseling and coordination of care; obtaining or reviewing history; documenting in the medical record; reviewing/ordering tests, medications or procedures; communicating with other healthcare professionals and discussing with patient's family/caregivers. Current Length of Stay: 5 day(s)  Current Patient Status: Inpatient   Certification Statement: The patient will continue to require additional inpatient hospital stay due to monitoring on PO diuretic, pending final cardiology clearance  Discharge Plan: Anticipate discharge tomorrow to home. Code Status: Level 1 - Full Code    Subjective:   Patient offers no new complaints today. States breathing feels good. Discussed tentative plan for discharge home tomorrow. Advised plan for pulmonary rehab following discharge. Objective:     Vitals:   Temp (24hrs), Av °F (36.7 °C), Min:97.5 °F (36.4 °C), Max:98.4 °F (36.9 °C)    Temp:  [97.5 °F (36.4 °C)-98.4 °F (36.9 °C)] 97.5 °F (36.4 °C)  HR:  [74-86] 81  Resp:  [17-18] 18  BP: (121-144)/(52-93) 132/80  SpO2:  [94 %-96 %] 94 %  Body mass index is 45.23 kg/m². Input and Output Summary (last 24 hours): Intake/Output Summary (Last 24 hours) at 10/11/2023 1602  Last data filed at 10/11/2023 1300  Gross per 24 hour   Intake 420 ml   Output 2000 ml   Net -1580 ml       Physical Exam:   Physical Exam  Vitals reviewed. Constitutional:       General: He is not in acute distress. Appearance: He is morbidly obese. Cardiovascular:      Rate and Rhythm: Normal rate. Pulmonary:      Effort: Pulmonary effort is normal. No respiratory distress. Musculoskeletal:      Right lower leg: Edema present. Left lower leg: Edema present. Comments: Trace LE edema   Neurological:      General: No focal deficit present. Mental Status: He is alert and oriented to person, place, and time. Mental status is at baseline.           Additional Data:     Labs:  Results from last 7 days   Lab Units 10/10/23  0604 10/09/23  0528   WBC Thousand/uL 8.47 7.73   HEMOGLOBIN g/dL 10.5* 10.1*   HEMATOCRIT % 33.9* 32.7*   PLATELETS Thousands/uL 277 257   NEUTROS PCT %  --  59   LYMPHS PCT %  --  13*   MONOS PCT %  --  8   EOS PCT %  --  18*     Results from last 7 days   Lab Units 10/11/23  0522   SODIUM mmol/L 135   POTASSIUM mmol/L 3.6   CHLORIDE mmol/L 96   CO2 mmol/L 30   BUN mg/dL 34*   CREATININE mg/dL 1.93*   ANION GAP mmol/L 9   CALCIUM mg/dL 8.4   ALBUMIN g/dL 3.6   TOTAL BILIRUBIN mg/dL 0.82   ALK PHOS U/L 85   ALT U/L 128*   AST U/L 169*   GLUCOSE RANDOM mg/dL 180*         Results from last 7 days   Lab Units 10/11/23  1149 10/11/23  0614 10/10/23  2049 10/10/23  1700 10/10/23  1206 10/10/23  0610 10/09/23  2038 10/09/23  1709 10/09/23  1202 10/09/23  0807 10/08/23  2114 10/08/23  1643   POC GLUCOSE mg/dl 253* 179* 266* 212* 238* 176* 224* 234* 232* 180* 228* 196*               Lines/Drains:  Invasive Devices       Peripheral Intravenous Line  Duration             Peripheral IV 10/10/23 Dorsal (posterior); Left Forearm 1 day                      Telemetry:  Telemetry Orders (From admission, onward)               24 Hour Telemetry Monitoring  Continuous x 24 Hours (Telem)        Question:  Reason for 24 Hour Telemetry  Answer:  Decompensated CHF- and any one of the following: continuous diuretic infusion or total diuretic dose >200 mg daily, associated electrolyte derangement (I.e. K < 3.0), ionotropic drip (continuous infusion), hx of ventricular arrhythmia, or new EF < 35%                     Telemetry Reviewed: Normal Sinus Rhythm  Indication for Continued Telemetry Use: No indication for continued use. Will discontinue.               Imaging: Reviewed radiology reports from this admission including: procedure reports    Recent Cultures (last 7 days):         Last 24 Hours Medication List:   Current Facility-Administered Medications   Medication Dose Route Frequency Provider Last Rate    albuterol  2 puff Inhalation Q4H PRN Ilda Carlton PA-C      apixaban  5 mg Oral BID Meredith PRADO RODRIGUE Bowen      atorvastatin  10 mg Oral Daily Ilda E Held, PA-C      budesonide-formoterol  2 puff Inhalation BID Ilda E Held, PA-C      bumetanide  6 mg Oral BID RODRIGUE Rodriges      Empagliflozin  10 mg Oral Daily Ilda E Held, PA-C      fluticasone  1 spray Each Nare BID Ilda E Held, PA-C      insulin lispro  1-6 Units Subcutaneous HS Ilda E Held, PA-C      insulin lispro  2-12 Units Subcutaneous TID AC Ilda E Held, PA-C      insulin lispro  5 Units Subcutaneous TID With Meals Ilda E Held, PA-C      loratadine  10 mg Oral Daily Ilda E Held, PA-C      melatonin  6 mg Oral HS Ilda E Held, PA-C      metoprolol succinate  50 mg Oral Daily Ilda E Held, PA-C      montelukast  10 mg Oral HS Ilda E Held, PA-C      pantoprazole  40 mg Oral Daily Ilda E Held, PA-C      potassium chloride  40 mEq Oral BID Ilda E Held, PA-C      pregabalin  50 mg Oral HS Ilda E Held, PA-C      spironolactone  25 mg Oral Daily Ilda E Held, PA-C      umeclidinium  1 puff Inhalation Daily Ilda E Held, PA-C          Today, Patient Was Seen By: Janie Alexander PA-C    **Please Note: This note may have been constructed using a voice recognition system. **

## 2023-10-11 NOTE — APP STUDENT NOTE
Assessment/Plan    Acute on chronic diastolic heart failure  Weights were increased today likely due to transition from IV to PO Bumex. Reevaluate after I/O's performed. Low-sodium, fluid restriction  Continue home regimen Jardiance 10mg daily, Metoprolol succinate 50mg, and spironolactone 25mg daily. Monitor electrolytes and replace as needed. Acute transaminitis:       Type 2 diabetes: Continue Jaurdiance. Continue SSI and 5 units AC. Sugars this am were stable and improving. Nasal Congestion: Continue loratadine 10mg and fluticasone 50mcg/act nasal spray daily. Severe persistent asthma: Well controlled on albuterol 2 puffs daily and umeclidnium 1 puff daily. No exacerbations during admission. VICKY: Patient waiting on CPAP machine. Follow up with PCP upon discharge. Morbid obesity: Samuel en y surgery performed in 2004. Is considering discussion with bariatric surgery. Diet and lifestyle modifications discussed. VTE Pharmacologic Prophylaxis:   Pharmacologic: Apixaban (Eliquis)  Mechanical VTE Prophylaxis in Place: Yes    Patient Centered Rounds: ***    Discussions with Specialists or Other Care Team Provider:     Education and Discussions with Family / Patient:     Time Spent for Care: 15 minutes. More than 50% of total time spent on counseling and coordination of care as described above. Current Length of Stay: 5 day(s)    Current Patient Status: Inpatient   Certification Statement: ***    Discharge Plan: Patient likely to be discharged within 24 hours. RHC performed without complications. Weights continuing to decline. SOB improving. Code Status: Level 1 - Full Code      Subjective:   Gregorio Lopez reports no complaints today. This morning he underwent RHC with cardiology with no complications. He reports his SOB is continuing to improve and is currently only present on exertion. He is performing daily walks and feels "a little winded" after.  His weights were increased this morning by 1 pound. He has no CP, palpitations, nausea, vomiting, dizziness. He reports no new episodes of muscle cramping. He states his congestion has improved with the Claritin and fluticasone. Denies fever, chills, sore throat, or headache. Objective:     Vitals:   Temp (24hrs), Av °F (36.7 °C), Min:97.7 °F (36.5 °C), Max:98.4 °F (36.9 °C)    Temp:  [97.7 °F (36.5 °C)-98.4 °F (36.9 °C)] 98.4 °F (36.9 °C)  HR:  [74-86] 79  Resp:  [17-20] 17  BP: (121-144)/(52-93) 141/85  SpO2:  [94 %-96 %] 94 %  Body mass index is 45.23 kg/m². Input and Output Summary (last 24 hours): Intake/Output Summary (Last 24 hours) at 10/11/2023 1135  Last data filed at 10/11/2023 0931  Gross per 24 hour   Intake 90 ml   Output 1815 ml   Net -1725 ml       Physical Exam:     Physical Exam  Constitutional:       Comments: Morbidly obese body habitus   HENT:      Head: Normocephalic and atraumatic. Nose: Congestion present. Mouth/Throat:      Mouth: Mucous membranes are moist.   Cardiovascular:      Rate and Rhythm: Normal rate and regular rhythm. Pulses: Normal pulses. Heart sounds: Normal heart sounds. Comments: +2 pitting edema on B/L LE  Pulmonary:      Effort: Pulmonary effort is normal.      Breath sounds: Normal breath sounds. Comments: CTA b/l. No wheezes, rales or rhonci  Abdominal:      Palpations: Abdomen is soft. Musculoskeletal:      Cervical back: Normal range of motion and neck supple. Skin:     General: Skin is warm and dry. Neurological:      Mental Status: He is alert.    Psychiatric:         Mood and Affect: Mood normal.         Behavior: Behavior normal.       ( *** Be Sure to Include Physical Exam: Delete this entire line when you have entered your exam)    Additional Data:     Labs:    Results from last 7 days   Lab Units 10/10/23  0604 10/09/23  0528   WBC Thousand/uL 8.47 7.73   HEMOGLOBIN g/dL 10.5* 10.1*   HEMATOCRIT % 33.9* 32.7*   PLATELETS Thousands/uL 277 257 NEUTROS PCT %  --  59   LYMPHS PCT %  --  13*   MONOS PCT %  --  8   EOS PCT %  --  18*     Results from last 7 days   Lab Units 10/11/23  0522   SODIUM mmol/L 135   POTASSIUM mmol/L 3.6   CHLORIDE mmol/L 96   CO2 mmol/L 30   BUN mg/dL 34*   CREATININE mg/dL 1.93*   ANION GAP mmol/L 9   CALCIUM mg/dL 8.4   ALBUMIN g/dL 3.6   TOTAL BILIRUBIN mg/dL 0.82   ALK PHOS U/L 85   ALT U/L 128*   AST U/L 169*   GLUCOSE RANDOM mg/dL 180*         Results from last 7 days   Lab Units 10/11/23  0614 10/10/23  2049 10/10/23  1700 10/10/23  1206 10/10/23  0610 10/09/23  2038 10/09/23  1709 10/09/23  1202 10/09/23  0807 10/08/23  2114 10/08/23  1643 10/08/23  1115   POC GLUCOSE mg/dl 179* 266* 212* 238* 176* 224* 234* 232* 180* 228* 196* 234*                   * I Have Reviewed All Lab Data Listed Above.   * Additional Pertinent Lab Tests Reviewed: {Labreview:[id number]}    Imaging:    Imaging Reports Reviewed Today Include: ***  Imaging Personally Reviewed by Myself Includes:  ***    Recent Cultures (last 7 days):           Last 24 Hours Medication List:   Current Facility-Administered Medications   Medication Dose Route Frequency Provider Last Rate    albuterol  2 puff Inhalation Q4H PRN Ilda E Held, PA-C      apixaban  5 mg Oral BID RODRIGUE Rodriges      atorvastatin  10 mg Oral Daily Ilda E Held, PA-C      budesonide-formoterol  2 puff Inhalation BID Ilda E Held, PA-C      bumetanide  6 mg Oral BID RODRIGUE Rodriges      Empagliflozin  10 mg Oral Daily Ilda E Held, PA-C      fluticasone  1 spray Each Nare BID Ilda E Held, PA-C      insulin lispro  1-6 Units Subcutaneous HS Ilda E Held, PA-C      insulin lispro  2-12 Units Subcutaneous TID AC Ilda E Held, PA-C      insulin lispro  5 Units Subcutaneous TID With Meals Ilda E Held, PA-C      loratadine  10 mg Oral Daily Ilda E Held, KARINE      melatonin  6 mg Oral HS Ilda E Held, KARINE      metoprolol succinate  50 mg Oral Daily Ilda E Held, KARINE      montelukast 10 mg Oral HS Ilda E Held, PA-C      pantoprazole  40 mg Oral Daily Ilda E Held, PA-C      potassium chloride  40 mEq Oral BID Ilda E Held, PA-C      pregabalin  50 mg Oral HS Ilda E Held, PA-C      spironolactone  25 mg Oral Daily Ilda E Held, PA-C      umeclidinium  1 puff Inhalation Daily Ilda E Held, PA-C          Today, Patient Was Seen By: Daniel Barbour    ** Please Note: Dictation voice to text software may have been used in the creation of this document.  **

## 2023-10-12 ENCOUNTER — PATIENT OUTREACH (OUTPATIENT)
Dept: CARDIOLOGY CLINIC | Facility: CLINIC | Age: 58
End: 2023-10-12

## 2023-10-12 ENCOUNTER — TRANSITIONAL CARE MANAGEMENT (OUTPATIENT)
Dept: FAMILY MEDICINE CLINIC | Facility: CLINIC | Age: 58
End: 2023-10-12

## 2023-10-12 VITALS
WEIGHT: 315 LBS | TEMPERATURE: 97.5 F | HEIGHT: 73 IN | OXYGEN SATURATION: 95 % | RESPIRATION RATE: 18 BRPM | HEART RATE: 77 BPM | BODY MASS INDEX: 41.75 KG/M2 | SYSTOLIC BLOOD PRESSURE: 104 MMHG | DIASTOLIC BLOOD PRESSURE: 53 MMHG

## 2023-10-12 LAB
ANION GAP SERPL CALCULATED.3IONS-SCNC: 11 MMOL/L
APTT PPP: 33 SECONDS (ref 23–37)
BUN SERPL-MCNC: 35 MG/DL (ref 5–25)
CALCIUM SERPL-MCNC: 8.4 MG/DL (ref 8.4–10.2)
CHLORIDE SERPL-SCNC: 97 MMOL/L (ref 96–108)
CO2 SERPL-SCNC: 29 MMOL/L (ref 21–32)
CREAT SERPL-MCNC: 2.03 MG/DL (ref 0.6–1.3)
GFR SERPL CREATININE-BSD FRML MDRD: 35 ML/MIN/1.73SQ M
GLUCOSE SERPL-MCNC: 167 MG/DL (ref 65–140)
GLUCOSE SERPL-MCNC: 177 MG/DL (ref 65–140)
GLUCOSE SERPL-MCNC: 231 MG/DL (ref 65–140)
INR PPP: 1.29 (ref 0.84–1.19)
POTASSIUM SERPL-SCNC: 3.9 MMOL/L (ref 3.5–5.3)
PROTHROMBIN TIME: 15.9 SECONDS (ref 11.6–14.5)
SODIUM SERPL-SCNC: 137 MMOL/L (ref 135–147)

## 2023-10-12 PROCEDURE — 99232 SBSQ HOSP IP/OBS MODERATE 35: CPT | Performed by: STUDENT IN AN ORGANIZED HEALTH CARE EDUCATION/TRAINING PROGRAM

## 2023-10-12 PROCEDURE — 80048 BASIC METABOLIC PNL TOTAL CA: CPT | Performed by: NURSE PRACTITIONER

## 2023-10-12 PROCEDURE — 85610 PROTHROMBIN TIME: CPT | Performed by: PHYSICIAN ASSISTANT

## 2023-10-12 PROCEDURE — 99239 HOSP IP/OBS DSCHRG MGMT >30: CPT | Performed by: NURSE PRACTITIONER

## 2023-10-12 PROCEDURE — 85730 THROMBOPLASTIN TIME PARTIAL: CPT | Performed by: PHYSICIAN ASSISTANT

## 2023-10-12 PROCEDURE — 99232 SBSQ HOSP IP/OBS MODERATE 35: CPT | Performed by: INTERNAL MEDICINE

## 2023-10-12 PROCEDURE — 82948 REAGENT STRIP/BLOOD GLUCOSE: CPT

## 2023-10-12 PROCEDURE — NC001 PR NO CHARGE: Performed by: STUDENT IN AN ORGANIZED HEALTH CARE EDUCATION/TRAINING PROGRAM

## 2023-10-12 RX ORDER — METOLAZONE 2.5 MG/1
TABLET ORAL
Qty: 90 TABLET | Refills: 0 | Status: SHIPPED | OUTPATIENT
Start: 2023-10-12

## 2023-10-12 RX ORDER — FLUTICASONE PROPIONATE 50 MCG
1 SPRAY, SUSPENSION (ML) NASAL 2 TIMES DAILY
Refills: 0 | COMMUNITY
Start: 2023-10-12

## 2023-10-12 RX ORDER — BUMETANIDE 2 MG/1
6 TABLET ORAL 2 TIMES DAILY
Qty: 180 TABLET | Refills: 0 | Status: CANCELLED | OUTPATIENT
Start: 2023-10-13

## 2023-10-12 RX ORDER — LORATADINE 10 MG/1
10 TABLET ORAL DAILY
Refills: 0 | COMMUNITY
Start: 2023-10-13

## 2023-10-12 RX ORDER — ALBUTEROL SULFATE 90 UG/1
2 AEROSOL, METERED RESPIRATORY (INHALATION) EVERY 4 HOURS PRN
Qty: 18 G | Refills: 0 | Status: SHIPPED | OUTPATIENT
Start: 2023-10-12

## 2023-10-12 RX ORDER — BUMETANIDE 1 MG/1
6 TABLET ORAL 2 TIMES DAILY
Qty: 300 TABLET | Refills: 0 | Status: SHIPPED | OUTPATIENT
Start: 2023-10-13 | End: 2023-11-12

## 2023-10-12 RX ADMIN — APIXABAN 5 MG: 5 TABLET, FILM COATED ORAL at 09:05

## 2023-10-12 RX ADMIN — BUMETANIDE 6 MG: 2 TABLET ORAL at 09:04

## 2023-10-12 RX ADMIN — UMECLIDINIUM 1 PUFF: 62.5 AEROSOL, POWDER ORAL at 09:04

## 2023-10-12 RX ADMIN — ATORVASTATIN CALCIUM 10 MG: 10 TABLET, FILM COATED ORAL at 09:05

## 2023-10-12 RX ADMIN — EMPAGLIFLOZIN 10 MG: 10 TABLET, FILM COATED ORAL at 13:27

## 2023-10-12 RX ADMIN — INSULIN LISPRO 5 UNITS: 100 INJECTION, SOLUTION INTRAVENOUS; SUBCUTANEOUS at 12:35

## 2023-10-12 RX ADMIN — BUDESONIDE AND FORMOTEROL FUMARATE DIHYDRATE 2 PUFF: 160; 4.5 AEROSOL RESPIRATORY (INHALATION) at 09:04

## 2023-10-12 RX ADMIN — LORATADINE 10 MG: 10 TABLET ORAL at 09:05

## 2023-10-12 RX ADMIN — POTASSIUM CHLORIDE 40 MEQ: 1500 TABLET, EXTENDED RELEASE ORAL at 09:05

## 2023-10-12 RX ADMIN — INSULIN LISPRO 5 UNITS: 100 INJECTION, SOLUTION INTRAVENOUS; SUBCUTANEOUS at 09:07

## 2023-10-12 RX ADMIN — PANTOPRAZOLE SODIUM 40 MG: 40 TABLET, DELAYED RELEASE ORAL at 05:44

## 2023-10-12 RX ADMIN — FLUTICASONE PROPIONATE 1 SPRAY: 50 SPRAY, METERED NASAL at 09:04

## 2023-10-12 RX ADMIN — INSULIN LISPRO 2 UNITS: 100 INJECTION, SOLUTION INTRAVENOUS; SUBCUTANEOUS at 09:05

## 2023-10-12 RX ADMIN — METOPROLOL SUCCINATE 50 MG: 50 TABLET, EXTENDED RELEASE ORAL at 09:05

## 2023-10-12 RX ADMIN — INSULIN LISPRO 4 UNITS: 100 INJECTION, SOLUTION INTRAVENOUS; SUBCUTANEOUS at 12:34

## 2023-10-12 RX ADMIN — SPIRONOLACTONE 25 MG: 25 TABLET ORAL at 09:05

## 2023-10-12 NOTE — PLAN OF CARE
Problem: Prexisting or High Potential for Compromised Skin Integrity  Goal: Skin integrity is maintained or improved  Description: INTERVENTIONS:  - Identify patients at risk for skin breakdown  - Assess and monitor skin integrity  - Assess and monitor nutrition and hydration status  - Monitor labs   - Assess for incontinence   - Turn and reposition patient  - Assist with mobility/ambulation  - Relieve pressure over bony prominences  - Avoid friction and shearing  - Provide appropriate hygiene as needed including keeping skin clean and dry  - Evaluate need for skin moisturizer/barrier cream  - Collaborate with interdisciplinary team   - Patient/family teaching  - Consider wound care consult   Outcome: Progressing     Problem: CARDIOVASCULAR - ADULT  Goal: Maintains optimal cardiac output and hemodynamic stability  Description: INTERVENTIONS:  - Monitor I/O, vital signs and rhythm  - Monitor for S/S and trends of decreased cardiac output  - Administer and titrate ordered vasoactive medications to optimize hemodynamic stability  - Assess quality of pulses, skin color and temperature  - Assess for signs of decreased coronary artery perfusion  - Instruct patient to report change in severity of symptoms  Outcome: Progressing  Goal: Absence of cardiac dysrhythmias or at baseline rhythm  Description: INTERVENTIONS:  - Continuous cardiac monitoring, vital signs, obtain 12 lead EKG if ordered  - Administer antiarrhythmic and heart rate control medications as ordered  - Monitor electrolytes and administer replacement therapy as ordered  Outcome: Progressing     Problem: Nutrition/Hydration-ADULT  Goal: Nutrient/Hydration intake appropriate for improving, restoring or maintaining nutritional needs  Description: Monitor and assess patient's nutrition/hydration status for malnutrition. Collaborate with interdisciplinary team and initiate plan and interventions as ordered.   Monitor patient's weight and dietary intake as ordered or per policy. Utilize nutrition screening tool and intervene as necessary. Determine patient's food preferences and provide high-protein, high-caloric foods as appropriate.      INTERVENTIONS:  - Monitor oral intake, urinary output, labs, and treatment plans  - Assess nutrition and hydration status and recommend course of action  - Evaluate amount of meals eaten  - Assist patient with eating if necessary   - Allow adequate time for meals  - Recommend/ encourage appropriate diets, oral nutritional supplements, and vitamin/mineral supplements  - Order, calculate, and assess calorie counts as needed  - Recommend, monitor, and adjust tube feedings and TPN/PPN based on assessed needs  - Assess need for intravenous fluids  - Provide specific nutrition/hydration education as appropriate  - Include patient/family/caregiver in decisions related to nutrition  Outcome: Progressing     Problem: METABOLIC, FLUID AND ELECTROLYTES - ADULT  Goal: Electrolytes maintained within normal limits  Description: INTERVENTIONS:  - Monitor labs and assess patient for signs and symptoms of electrolyte imbalances  - Administer electrolyte replacement as ordered  - Monitor response to electrolyte replacements, including repeat lab results as appropriate  - Instruct patient on fluid and nutrition as appropriate  Outcome: Progressing  Goal: Fluid balance maintained  Description: INTERVENTIONS:  - Monitor labs   - Monitor I/O and WT  - Instruct patient on fluid and nutrition as appropriate  - Assess for signs & symptoms of volume excess or deficit  Outcome: Progressing  Goal: Glucose maintained within target range  Description: INTERVENTIONS:  - Monitor Blood Glucose as ordered  - Assess for signs and symptoms of hyperglycemia and hypoglycemia  - Administer ordered medications to maintain glucose within target range  - Assess nutritional intake and initiate nutrition service referral as needed  Outcome: Progressing     Problem: PAIN - ADULT  Goal: Verbalizes/displays adequate comfort level or baseline comfort level  Description: Interventions:  - Encourage patient to monitor pain and request assistance  - Assess pain using appropriate pain scale  - Administer analgesics based on type and severity of pain and evaluate response  - Implement non-pharmacological measures as appropriate and evaluate response  - Consider cultural and social influences on pain and pain management  - Notify physician/advanced practitioner if interventions unsuccessful or patient reports new pain  Outcome: Progressing     Problem: INFECTION - ADULT  Goal: Absence or prevention of progression during hospitalization  Description: INTERVENTIONS:  - Assess and monitor for signs and symptoms of infection  - Monitor lab/diagnostic results  - Monitor all insertion sites, i.e. indwelling lines, tubes, and drains  - Monitor endotracheal if appropriate and nasal secretions for changes in amount and color  - [place] appropriate cooling/warming therapies per order  - Administer medications as ordered  - Instruct and encourage patient and family to use good hand hygiene technique  - Identify and instruct in appropriate isolation precautions for identified infection/condition  Outcome: Progressing  Goal: Absence of fever/infection during neutropenic period  Description: INTERVENTIONS:  - Monitor WBC    Outcome: Progressing     Problem: SAFETY ADULT  Goal: Patient will remain free of falls  Description: INTERVENTIONS:  - Educate patient/family on patient safety including physical limitations  - Instruct patient to call for assistance with activity   - Consult OT/PT to assist with strengthening/mobility   - Keep Call bell within reach  - Keep bed low and locked with side rails adjusted as appropriate  - Keep care items and personal belongings within reach  - Initiate and maintain comfort rounds  - Make Fall Risk Sign visible to staff  - Offer Toileting every *** Hours, in advance of need  - Initiate/Maintain ***alarm  - Obtain necessary fall risk management equipment: ***  - Apply yellow socks and bracelet for high fall risk patients  - Consider moving patient to room near nurses station  Outcome: Progressing  Goal: Maintain or return to baseline ADL function  Description: INTERVENTIONS:  -  Assess patient's ability to carry out ADLs; assess patient's baseline for ADL function and identify physical deficits which impact ability to perform ADLs (bathing, care of mouth/teeth, toileting, grooming, dressing, etc.)  - Assess/evaluate cause of self-care deficits   - Assess range of motion  - Assess patient's mobility; develop plan if impaired  - Assess patient's need for assistive devices and provide as appropriate  - Encourage maximum independence but intervene and supervise when necessary  - Involve family in performance of ADLs  - Assess for home care needs following discharge   - Consider OT consult to assist with ADL evaluation and planning for discharge  - Provide patient education as appropriate  Outcome: Progressing  Goal: Maintains/Returns to pre admission functional level  Description: INTERVENTIONS:  - Perform BMAT or MOVE assessment daily.   - Set and communicate daily mobility goal to care team and patient/family/caregiver. - Collaborate with rehabilitation services on mobility goals if consulted  - Perform Range of Motion *** times a day. - Reposition patient every *** hours.   - Dangle patient *** times a day  - Stand patient *** times a day  - Ambulate patient *** times a day  - Out of bed to chair *** times a day   - Out of bed for meals *** times a day  - Out of bed for toileting  - Record patient progress and toleration of activity level   Outcome: Progressing     Problem: DISCHARGE PLANNING  Goal: Discharge to home or other facility with appropriate resources  Description: INTERVENTIONS:  - Identify barriers to discharge w/patient and caregiver  - Arrange for needed discharge resources and transportation as appropriate  - Identify discharge learning needs (meds, wound care, etc.)  - Arrange for interpretive services to assist at discharge as needed  - Refer to Case Management Department for coordinating discharge planning if the patient needs post-hospital services based on physician/advanced practitioner order or complex needs related to functional status, cognitive ability, or social support system  Outcome: Progressing     Problem: Knowledge Deficit  Goal: Patient/family/caregiver demonstrates understanding of disease process, treatment plan, medications, and discharge instructions  Description: Complete learning assessment and assess knowledge base.   Interventions:  - Provide teaching at level of understanding  - Provide teaching via preferred learning methods  Outcome: Progressing

## 2023-10-12 NOTE — DISCHARGE INSTR - AVS FIRST PAGE
Dear Austin Escobar,     It was our pleasure to care for you here at [facility].  It is our hope that we were always able to meet and exceed the expected standards for your care during your stay. You were hospitalized due to Congestive Heart Failure . You were cared for on the 5th floor under the service of RODRIGUE Restrepo with the [facility] who covers for your primary care physician (PCP), Jorge Richardson MD, while you were hospitalized. If you have any questions or concerns related to this hospitalization, you may contact us at [phone]. For follow up, we recommend that you follow up with your primary care physician. Please review this entire discharge summary as additional general instructions may be provided later as well. However, at this time we provide for you here, the most important instructions / recommendations at discharge:     Limit your total sodium intake each day to less than 2000 mg (2 grams) per day maximum. It is very important to read food labels as there is hidden sodium in various types of foods. Also, do not add salt to your food. Sodium / salt causes fluid retention and can cause your heart failure to act up again. Limit total fluid intake to one and a half liters per day maximum (or one and a half quarts if that is easier to remember). This includes all fluids consumed. Take your diuretic (water pill) and other prescribed heart medications as recommended. Follow up with your heart doctor within 1 week. Weigh yourself daily on the same scale with the same amount of clothing each day. If you have a weight gain > 3 lbs in a day or 5 lbs in a week, please contact the [address] [place] [address] 7Th Failure Program at  for further instructions especially if weight gain is associated with increased shortness of breath or leg swelling.     Sincerely,     RODRIGUE Restrepo

## 2023-10-12 NOTE — PLAN OF CARE
Problem: Prexisting or High Potential for Compromised Skin Integrity  Goal: Skin integrity is maintained or improved  Description: INTERVENTIONS:  - Identify patients at risk for skin breakdown  - Assess and monitor skin integrity  - Assess and monitor nutrition and hydration status  - Monitor labs   - Assess for incontinence   - Turn and reposition patient  - Assist with mobility/ambulation  - Relieve pressure over bony prominences  - Avoid friction and shearing  - Provide appropriate hygiene as needed including keeping skin clean and dry  - Evaluate need for skin moisturizer/barrier cream  - Collaborate with interdisciplinary team   - Patient/family teaching  - Consider wound care consult   Outcome: Adequate for Discharge     Problem: CARDIOVASCULAR - ADULT  Goal: Maintains optimal cardiac output and hemodynamic stability  Description: INTERVENTIONS:  - Monitor I/O, vital signs and rhythm  - Monitor for S/S and trends of decreased cardiac output  - Administer and titrate ordered vasoactive medications to optimize hemodynamic stability  - Assess quality of pulses, skin color and temperature  - Assess for signs of decreased coronary artery perfusion  - Instruct patient to report change in severity of symptoms  Outcome: Adequate for Discharge  Goal: Absence of cardiac dysrhythmias or at baseline rhythm  Description: INTERVENTIONS:  - Continuous cardiac monitoring, vital signs, obtain 12 lead EKG if ordered  - Administer antiarrhythmic and heart rate control medications as ordered  - Monitor electrolytes and administer replacement therapy as ordered  Outcome: Adequate for Discharge     Problem: Nutrition/Hydration-ADULT  Goal: Nutrient/Hydration intake appropriate for improving, restoring or maintaining nutritional needs  Description: Monitor and assess patient's nutrition/hydration status for malnutrition. Collaborate with interdisciplinary team and initiate plan and interventions as ordered.   Monitor patient's weight and dietary intake as ordered or per policy. Utilize nutrition screening tool and intervene as necessary. Determine patient's food preferences and provide high-protein, high-caloric foods as appropriate.      INTERVENTIONS:  - Monitor oral intake, urinary output, labs, and treatment plans  - Assess nutrition and hydration status and recommend course of action  - Evaluate amount of meals eaten  - Assist patient with eating if necessary   - Allow adequate time for meals  - Recommend/ encourage appropriate diets, oral nutritional supplements, and vitamin/mineral supplements  - Order, calculate, and assess calorie counts as needed  - Recommend, monitor, and adjust tube feedings and TPN/PPN based on assessed needs  - Assess need for intravenous fluids  - Provide specific nutrition/hydration education as appropriate  - Include patient/family/caregiver in decisions related to nutrition  Outcome: Adequate for Discharge     Problem: METABOLIC, FLUID AND ELECTROLYTES - ADULT  Goal: Electrolytes maintained within normal limits  Description: INTERVENTIONS:  - Monitor labs and assess patient for signs and symptoms of electrolyte imbalances  - Administer electrolyte replacement as ordered  - Monitor response to electrolyte replacements, including repeat lab results as appropriate  - Instruct patient on fluid and nutrition as appropriate  Outcome: Adequate for Discharge  Goal: Fluid balance maintained  Description: INTERVENTIONS:  - Monitor labs   - Monitor I/O and WT  - Instruct patient on fluid and nutrition as appropriate  - Assess for signs & symptoms of volume excess or deficit  Outcome: Adequate for Discharge  Goal: Glucose maintained within target range  Description: INTERVENTIONS:  - Monitor Blood Glucose as ordered  - Assess for signs and symptoms of hyperglycemia and hypoglycemia  - Administer ordered medications to maintain glucose within target range  - Assess nutritional intake and initiate nutrition service referral as needed  Outcome: Adequate for Discharge     Problem: PAIN - ADULT  Goal: Verbalizes/displays adequate comfort level or baseline comfort level  Description: Interventions:  - Encourage patient to monitor pain and request assistance  - Assess pain using appropriate pain scale  - Administer analgesics based on type and severity of pain and evaluate response  - Implement non-pharmacological measures as appropriate and evaluate response  - Consider cultural and social influences on pain and pain management  - Notify physician/advanced practitioner if interventions unsuccessful or patient reports new pain  Outcome: Adequate for Discharge     Problem: INFECTION - ADULT  Goal: Absence or prevention of progression during hospitalization  Description: INTERVENTIONS:  - Assess and monitor for signs and symptoms of infection  - Monitor lab/diagnostic results  - Monitor all insertion sites, i.e. indwelling lines, tubes, and drains  - Monitor endotracheal if appropriate and nasal secretions for changes in amount and color  - [place] appropriate cooling/warming therapies per order  - Administer medications as ordered  - Instruct and encourage patient and family to use good hand hygiene technique  - Identify and instruct in appropriate isolation precautions for identified infection/condition  Outcome: Adequate for Discharge  Goal: Absence of fever/infection during neutropenic period  Description: INTERVENTIONS:  - Monitor WBC    Outcome: Adequate for Discharge     Problem: SAFETY ADULT  Goal: Patient will remain free of falls  Description: INTERVENTIONS:  - Educate patient/family on patient safety including physical limitations  - Instruct patient to call for assistance with activity   - Consult OT/PT to assist with strengthening/mobility   - Keep Call bell within reach  - Keep bed low and locked with side rails adjusted as appropriate  - Keep care items and personal belongings within reach  - Initiate and maintain comfort rounds  - Make Fall Risk Sign visible to staff  - Offer Toileting every 2 Hours, in advance of need  - Initiate/Maintain done alarm  - Obtain necessary fall risk management equipment: call bell in reach  - Apply yellow socks and bracelet for high fall risk patients  - Consider moving patient to room near nurses station  Outcome: Adequate for Discharge  Goal: Maintain or return to baseline ADL function  Description: INTERVENTIONS:  -  Assess patient's ability to carry out ADLs; assess patient's baseline for ADL function and identify physical deficits which impact ability to perform ADLs (bathing, care of mouth/teeth, toileting, grooming, dressing, etc.)  - Assess/evaluate cause of self-care deficits   - Assess range of motion  - Assess patient's mobility; develop plan if impaired  - Assess patient's need for assistive devices and provide as appropriate  - Encourage maximum independence but intervene and supervise when necessary  - Involve family in performance of ADLs  - Assess for home care needs following discharge   - Consider OT consult to assist with ADL evaluation and planning for discharge  - Provide patient education as appropriate  Outcome: Adequate for Discharge  Goal: Maintains/Returns to pre admission functional level  Description: INTERVENTIONS:  - Perform BMAT or MOVE assessment daily.   - Set and communicate daily mobility goal to care team and patient/family/caregiver. - Collaborate with rehabilitation services on mobility goals if consulted  - Perform Range of Motion 3 times a day. - Reposition patient every 2 hours.   - Dangle patient 2 times a day  - Stand patient 3 times a day  - Ambulate patient 3 times a day  - Out of bed to chair 3 times a day   - Out of bed for meals 3 times a day  - Out of bed for toileting  - Record patient progress and toleration of activity level   Outcome: Adequate for Discharge     Problem: DISCHARGE PLANNING  Goal: Discharge to home or other facility with appropriate resources  Description: INTERVENTIONS:  - Identify barriers to discharge w/patient and caregiver  - Arrange for needed discharge resources and transportation as appropriate  - Identify discharge learning needs (meds, wound care, etc.)  - Arrange for interpretive services to assist at discharge as needed  - Refer to Case Management Department for coordinating discharge planning if the patient needs post-hospital services based on physician/advanced practitioner order or complex needs related to functional status, cognitive ability, or social support system  Outcome: Adequate for Discharge     Problem: Knowledge Deficit  Goal: Patient/family/caregiver demonstrates understanding of disease process, treatment plan, medications, and discharge instructions  Description: Complete learning assessment and assess knowledge base.   Interventions:  - Provide teaching at level of understanding  - Provide teaching via preferred learning methods  Outcome: Adequate for Discharge

## 2023-10-12 NOTE — PROGRESS NOTES
Progress Note - Pulmonary   Bridgette Blue 66 y.o. male MRN: [id number]  Unit/Bed#: -71 Encounter: [id number]    Assessment:  Heart failure reduced ejection fraction with mild exacerbation currently stable with the pulmonary wedge pressure of 10  Severe eosinophilic pneumonia with fixed obstruction without exacerbation  Morbid obesity  VICKY not treated  CKD  DM2    Plan:  Appears to be euvolemic per right heart cath on 10/11  No need for steroids stable for discharge from pulmonary standpoint on Symbicort Spiriva and outpatient Clorinda Jaguar will need pulmonary follow-up in pulmonary rehab. I will arrange outpatient follow up in 2 weeks after discharge. He is cleared to be dc from pulm stand point. Chief Complaint:   I feel better    Subjective:   Patient states his breathing is better. Not in acute stress. No overnight event    Objective:     Vitals: Blood pressure 104/53, pulse 77, temperature 97.5 °F (36.4 °C), temperature source Oral, resp. rate 20, height 6' 1" (1.854 m), weight (!) 154 kg (340 lb 6.4 oz), SpO2 95 %. ,Body mass index is 44.91 kg/m². Intake/Output Summary (Last 24 hours) at 10/12/2023 0809  Last data filed at 10/11/2023 2101  Gross per 24 hour   Intake 778 ml   Output 3025 ml   Net -2247 ml       Invasive Devices       Peripheral Intravenous Line  Duration             Peripheral IV 10/10/23 Dorsal (posterior); Left Forearm 2 days                    Physical Exam: /53   Pulse 77   Temp 97.5 °F (36.4 °C) (Oral)   Resp 20   Ht 6' 1" (1.854 m)   Wt (!) 154 kg (340 lb 6.4 oz)   SpO2 95%   BMI 44.91 kg/m²   General appearance: alert and oriented, in no acute distress  Neck: no adenopathy, no carotid bruit, no JVD, supple, symmetrical, trachea midline, and thyroid not enlarged, symmetric, no tenderness/mass/nodules  Lungs: clear to auscultation bilaterally  Heart: regular rate and rhythm, S1, S2 normal, no murmur, click, rub or gallop  Abdomen: soft, non-tender; bowel sounds normal; no masses,  no organomegaly  Extremities: chronic lymphedema  Lymph nodes: Cervical, supraclavicular, and axillary nodes normal.  Neurologic: Grossly normal     Labs: CBC: No results found for: "WBC", "HGB", "HCT", "MCV", "PLT", "ADJUSTEDWBC", "RBC", "MCH", "MCHC", "RDW", "MPV", "NRBC", CMP:   Lab Results   Component Value Date    SODIUM 137 10/12/2023    K 3.9 10/12/2023    CL 97 10/12/2023    CO2 29 10/12/2023    BUN 35 (H) 10/12/2023    CREATININE 2.03 (H) 10/12/2023    CALCIUM 8.4 10/12/2023    EGFR 35 10/12/2023     Imaging and other studies: I have personally reviewed pertinent films in PACS

## 2023-10-12 NOTE — PROGRESS NOTES
Advanced Heart Failure/Pulmonary Hypertension - Attending Note - Shari Lynns 66 y.o. male MRN: [id number]    Please see the most recent comprehensive heart failure note by the AP/resident/fellow for complete documentation; this is the attending attestation only. Assessment:  66 y.o. male Hx per chart p/w volume overload despite recent cardiomems PAD readings near his target 15-18 range. Good Rx and diet compliance he says. HFimpEF, EF 50% now  s/p CardioMems implant 3/9/22    AF on [facility] 9/6/22: No obstructive CAD  # Hypertension  # Hyperlipidemia  # Diabetes mellitus, type II      # Obstructive sleep apnea , off CPAP x 1 year 2/2 recall  # S/p gastric bypass (Samuel-en-Y)surgery in 2007, pre surgical weight low 400lbs he says  Morbidly obese  # History of tobacco abuse    # Carpal tunnel syndrome, bilateral     # CKD  # Chronic respiratory failure    # Asthma, severe persistent     2021 PFTs:  Interpretation:  ? Severe obstructive airflow defect on spirometry  ? Significant improvement in airflow or forced vital capacity in response to the administration of bronchodilator per ATS standards. ? Air trapping as indicated by the lung volumes  ? Mild decrease in diffusion capacity  ?  Flow-volume loop consistent with obstruction      I reviewed all pertinent labs/imaging/data including but not limited to:    Temp:  [97.5 °F (36.4 °C)-98.7 °F (37.1 °C)] 97.5 °F (36.4 °C)  HR:  [76-83] 77  Resp:  [18-20] 18  BP: (104-136)/(53-80) 104/53     Weight (last 2 days)       Date/Time Weight    10/12/23 0623 154 (340.4)    10/11/23 0546 155 (342.8)    10/10/23 0600 152 (335.1)             Intake/Output Summary (Last 24 hours) at 10/12/2023 1204  Last data filed at 10/12/2023 1045  Gross per 24 hour   Intake 1198 ml   Output 2400 ml   Net -1202 ml       Results from last 7 days   Lab Units 10/12/23  0501 10/11/23  0522 10/10/23  0604   CREATININE mg/dL 2.03* 1.93* 1.84*     Lab Results   Component Value Date    K 3.9 10/12/2023     Lab Results   Component Value Date    HGBA1C 7.1 (A) 08/09/2023     Lab Results   Component Value Date    QBL8YSDODWMO 1.270 09/01/2022     Lab Results   Component Value Date    LDLCALC 86 10/06/2023     Lab Results   Component Value Date    BNP 93 10/06/2023      Lab Results   Component Value Date    NTBNP 697 (H) 04/10/2023                 Drips:        Cr BL may be 1.7 range    Plan:  Warm, euvolemic by exam and by 10/11 [facility]  CardioMEMS data correlated accurately to 10/11 [facility]  Cr slightly up but near likely BL  Tolerated IV to po diuretics switch well    Will plan to hold 2 doses of bumex for Cr rise then restart at slightly higher home dose than prior - will take bumex 6 bid (was 5 bid previously)  Metolazone 5mg + extra K as needed - plan discussed with pt    Cw other cardiac Rx, jardiance, aldactone    Pulm recs appreciated  Therapy optimized and planned for new CPAP    We discussed weight loss extensively    Near term HF fu post DC and repeat BMP    Key info from my prior notes:    Warm, near euvolemic by exam though limited by habitus  Feels slightly better with increased UO and weight loss since admission  Note discrepancy between clinical volume overload described on arrival and cardiomems near target range with recent PAD 15-18  Cr near his BL    His arrival weight was documented as 349lbs>>now 343lbs after diuresis  He states his best home dry weights have been 340lbs recently, though he was consistently 320s in recent years    Neurohormonal Blockade:    --Beta Blocker: metoprolol succinate 50 mg daily. --ARNi / ACEi / ARB: No.  --Aldosterone Antagonist: spironolactone 25 mg daily    --SGLT2 Inhibitor: Jardiance 10 mg     --prior home Diuretic: Bumex 5 mg BID, K 40 meq BID, prn metolazone      Sudden Cardiac Death Risk Reduction:    --LVEF >35%. Electrical Resynchronization:    --Candidacy for BiV device: narrow QRS. Advanced Therapies:  Will continue to monitor.  LVEF recovered     Studies:  I have reviewed all pertinent patient data/labs/imaging where available, including but not limited to the below studies. Selected results may be displayed here but comprehensive listing is omitted for note clarity and can be found in the epic chart. ECG. Echo. Stress. Cath. Thank you for the opportunity to participate in the care of this patient.     Meghna Higginbotham MD  Attending Physician  Advanced Heart Failure and Transplant Cardiology  [facility]

## 2023-10-12 NOTE — ASSESSMENT & PLAN NOTE
Dietary and lifestyle modification advised.   Morbid obesity complicates all facets of care
Dietary and lifestyle modification advised.   Morbid obesity complicates all facets of care
Does not appear in acute exacerbation  Continue home inhalers/nebulizers/Singulair  Pulmonary inputs appreciated   PFTs reviewed and consistent with asthma/COPD overlap   C/w Spiriva, symbicort and fasenra   Ambulatory referral to pulmonary rehab on discharge   Outpatient follow-up scheduled
Does not appear in acute exacerbation  Continue home inhalers/nebulizers/Singulair  Pulmonary inputs appreciated   PFTs reviewed and consistent with asthma/COPD overlap   Recommended adding Spiriva to regimen   Will need referral to pulmonary rehab on discharge   Outpatient follow-up scheduled
Lab Results   Component Value Date    EGFR 35 10/12/2023    EGFR 37 10/11/2023    EGFR 40 10/10/2023    CREATININE 2.03 (H) 10/12/2023    CREATININE 1.93 (H) 10/11/2023    CREATININE 1.84 (H) 10/10/2023   Baseline creatinine approximately 1.0-2.0  Diuresis per heart failure service as above   Monitor BMP- labs ordered to be repeated in close interim outpatient
Lab Results   Component Value Date    EGFR 37 10/11/2023    EGFR 40 10/10/2023    EGFR 38 10/09/2023    CREATININE 1.93 (H) 10/11/2023    CREATININE 1.84 (H) 10/10/2023    CREATININE 1.91 (H) 10/09/2023   Baseline creatinine approximately 1.0-2.0  Diuresis per heart failure service as above   Monitor BMP
Lab Results   Component Value Date    EGFR 38 10/09/2023    EGFR 42 10/08/2023    EGFR 54 10/07/2023    CREATININE 1.91 (H) 10/09/2023    CREATININE 1.75 (H) 10/08/2023    CREATININE 1.43 (H) 10/07/2023   · Renal function improved from recent values  · Monitor with BMP given need for diuresis  · Creatinine continues to increase
Lab Results   Component Value Date    EGFR 40 10/10/2023    EGFR 38 10/09/2023    EGFR 42 10/08/2023    CREATININE 1.84 (H) 10/10/2023    CREATININE 1.91 (H) 10/09/2023    CREATININE 1.75 (H) 10/08/2023   · baseline creatinine approximately 1.0-2.0  · Currently on IV diuresis as per heart failure service as above   · Monitor with BMP
Lab Results   Component Value Date    EGFR 42 10/08/2023    EGFR 54 10/07/2023    EGFR 65 10/06/2023    CREATININE 1.75 (H) 10/08/2023    CREATININE 1.43 (H) 10/07/2023    CREATININE 1.23 10/06/2023   · Renal function improved from recent values  · Monitor with BMP given need for diuresis  · Creatinine did bump up slightly today so continue to monitor closely.
Lab Results   Component Value Date    EGFR 54 10/07/2023    EGFR 65 10/06/2023    EGFR 58 10/06/2023    CREATININE 1.43 (H) 10/07/2023    CREATININE 1.23 10/06/2023    CREATININE 1.34 (H) 10/06/2023   · Renal function improved from recent values  · Monitor with BMP given need for diuresis  · Creatinine did bump up slightly today so continue to monitor closely.
Lab Results   Component Value Date    EGFR 65 10/06/2023    EGFR 58 10/06/2023    EGFR 43 08/04/2023    CREATININE 1.23 10/06/2023    CREATININE 1.34 (H) 10/06/2023    CREATININE 1.73 (H) 08/04/2023   · Renal function improved from recent values  · Monitor with BMP given need for diuresis
Lab Results   Component Value Date    HGBA1C 7.1 (A) 08/09/2023       No results for input(s): "POCGLU" in the last 72 hours. Blood Sugar Average: Last 72 hrs:  · Reasonably well controlled as outpatient  · Continue with Jardiance given heart failure, hold off on other oral medications.   Start correctional insulin coverage with Accu-Cheks while admitted  · Carb controlled diet  · Initiate hypoglycemia protocol
Lab Results   Component Value Date    HGBA1C 7.1 (A) 08/09/2023       Recent Labs     10/06/23  2348 10/07/23  0549 10/07/23  1200   POCGLU 188* 158* 210*       Blood Sugar Average: Last 72 hrs:  · (P) [id number] well controlled as outpatient  · Continue with Jardiance given heart failure, hold off on other oral medications.   Start correctional insulin coverage with Accu-Cheks while admitted  · Carb controlled diet  · Initiate hypoglycemia protocol
Lab Results   Component Value Date    HGBA1C 7.1 (A) 08/09/2023       Recent Labs     10/07/23  1638 10/07/23  2049 10/08/23  0708 10/08/23  1115   POCGLU 167* 228* 175* 234*       Blood Sugar Average: Last 72 hrs:  · (P) [id number] well controlled as outpatient  · Continue with Jardiance given heart failure, hold off on other oral medications.   Start correctional insulin coverage with Accu-Cheks while admitted  · Carb controlled diet  · Initiate hypoglycemia protocol
Lab Results   Component Value Date    HGBA1C 7.1 (A) 08/09/2023       Recent Labs     10/08/23  2114 10/09/23  0807 10/09/23  1202 10/09/23  1709   POCGLU 228* 180* 232* 234*       Blood Sugar Average: Last 72 hrs:  · (P) 202. 5Reasonably well controlled as outpatient  · Continue with Jardiance given heart failure, hold off on other oral medications.   Start correctional insulin coverage with Accu-Cheks while admitted  · Add meal time coverage  · Carb controlled diet  · Initiate hypoglycemia protocol
Lab Results   Component Value Date    HGBA1C 7.1 (A) 08/09/2023       Recent Labs     10/09/23  1202 10/09/23  1709 10/09/23  2038 10/10/23  0610   POCGLU 232* 234* 224* 176*       Blood Sugar Average: Last 72 hrs:  · (P) [id number]   · Reasonably well controlled as outpatient  · Continue with Jardiance given heart failure, hold other oral medications.     · Currently on SSI coverage alone   · Monitor accuchecks and adjust as needed   · Carb controlled diet  · Hypoglycemia protocol
Lab Results   Component Value Date    HGBA1C 7.1 (A) 08/09/2023       Recent Labs     10/10/23  1700 10/10/23  2049 10/11/23  0614 10/11/23  1149   POCGLU 212* 266* 179* 253*       Blood Sugar Average: Last 72 hrs:  (P) 216.4406953852233004   Reasonably well controlled as outpatient  Continue with Jardiance given heart failure, hold other oral medications.     Currently on 5 units Humalog with meals + SSI   Monitor accuchecks and adjust as needed   Carb controlled diet  Hypoglycemia protocol
Lab Results   Component Value Date    HGBA1C 7.1 (A) 08/09/2023       Recent Labs     10/11/23  1704 10/11/23  2023 10/12/23  0545 10/12/23  1201   POCGLU 144* 240* 177* 231*         Blood Sugar Average: Last 72 hrs:  (P) 213.5211444239872290   Reasonably well controlled as outpatient  Continue with Jardiance given heart failure- resume other oral medications at discharge.     Carb controlled diet
Not currently on CPAP as patient awaiting new device (current device recalled)  Advised follow-up with prescribing physician
Not currently on CPAP as patient awaiting new device (current device recalled)  Advised follow-up with prescribing physician
Patient reported nasal congestion   COVID-19 PCR negative   C/w Flonase and Claritin
Patient reporting nasal congestion; denies cough or fevers   COVID-19 PCR negative   Added Flonase and Claritin
Patient reporting nasal congestion; denies cough or fevers   · COVID-19 PCR negative   · Add Flonase and Claritin
Quality 110: Preventive Care And Screening: Influenza Immunization: Influenza Immunization previously received during influenza season
Rate controlled on metoprolol succinate, continue at home dosing  Continue anticoagulation with Eliquis
Rate controlled on metoprolol succinate, continue at home dosing  Continue anticoagulation with Eliquis
Wt Readings from Last 3 Encounters:   10/06/23 (!) 159 kg (349 lb 12.8 oz)   10/05/23 (!) 158 kg (349 lb)   08/23/23 (!) 157 kg (346 lb)     · Presenting with worsening shortness of breath even at rest now. Reported increased lower extremity edema and at least 7 pound weight gain over the past 3-4 days despite dosing of metolazone as below  · S/p 40 mg IV Lasix during ED evaluation, give 3 mg IV Bumex now additionally  · Heart failure service consult for further diuretic recommendations, patient follows with Dr. Favian Rowe. · Echocardiogram 4/11/2023 EF 48%, grade 2 diastolic dysfunction. Defer to heart failure service whether repeat echocardiogram is needed  · Continue remainder of cardiac medications  · Monitor daily weights, I/O, volume status  · Low-sodium, fluid strict diet  · Telemetry monitoring given high diuretic needs  · At least daily BMP while on IV diuresis, may need more frequently dependent on diuretic plan.   Replace electrolytes as needed and monitor renal function
Wt Readings from Last 3 Encounters:   10/07/23 (!) 159 kg (349 lb 9.6 oz)   10/05/23 (!) 158 kg (349 lb)   08/23/23 (!) 157 kg (346 lb)     · Presenting with worsening shortness of breath even at rest now. Reported increased lower extremity edema and at least 7 pound weight gain over the past 3-4 days despite dosing of metolazone as below  · S/p 40 mg IV Lasix during ED evaluation, give 3 mg IV Bumex now additionally  · Heart failure service consult for further diuretic recommendations, patient follows with Dr. Atif Palomino. · Echocardiogram 4/11/2023 EF 47%, grade 2 diastolic dysfunction. Defer to heart failure service whether repeat echocardiogram is needed  · Continue remainder of cardiac medications  · Monitor daily weights, I/O, volume status  · Low-sodium, fluid strict diet  · Telemetry monitoring given high diuretic needs  · At least daily BMP while on IV diuresis, may need more frequently dependent on diuretic plan. Replace electrolytes as needed and monitor renal function  · Cardiology consultation appreciated. · On Bumex 5 mg 3 times daily currently  · Monitor creatinine.   Slight bump today
Wt Readings from Last 3 Encounters:   10/08/23 (!) 156 kg (344 lb 12.8 oz)   10/05/23 (!) 158 kg (349 lb)   08/23/23 (!) 157 kg (346 lb)     · Presenting with worsening shortness of breath even at rest now. Reported increased lower extremity edema and at least 7 pound weight gain over the past 3-4 days despite dosing of metolazone as below  · S/p 40 mg IV Lasix during ED evaluation, give 3 mg IV Bumex now additionally  · Heart failure service consult for further diuretic recommendations, patient follows with Dr. Eulogio Beckman. · Echocardiogram 4/11/2023 EF 47%, grade 2 diastolic dysfunction. Defer to heart failure service whether repeat echocardiogram is needed  · Continue remainder of cardiac medications  · Monitor daily weights, I/O, volume status  · Low-sodium, fluid strict diet  · Telemetry monitoring given high diuretic needs  · At least daily BMP while on IV diuresis, may need more frequently dependent on diuretic plan. Replace electrolytes as needed and monitor renal function  · Cardiology consultation appreciated. · On Bumex 5 mg 3 times daily transitioned to 5mg iv BID 10/8  · Monitor creatinine.   Slight bump today
Wt Readings from Last 3 Encounters:   10/09/23 (!) 156 kg (343 lb 4.8 oz)   10/05/23 (!) 158 kg (349 lb)   08/23/23 (!) 157 kg (346 lb)     · Presenting with worsening shortness of breath even at rest on admission. Reported increased lower extremity edema and at least 7 pound weight gain over the past 3-4 days prior to admission, despite dosing of metolazone as below  · S/p 40 mg IV Lasix during ED evaluation, give 3 mg IV Bumex additionally  · Heart failure service consult for further diuretic recommendations, patient follows with Dr. Pema Abdi. · Echocardiogram 4/11/2023 EF 57%, grade 2 diastolic dysfunction. · Continue remainder of cardiac medications  · Monitor daily weights, I/O, volume status  · Low-sodium, fluid strict diet  · Telemetry monitoring given high diuretic needs  · At least daily BMP while on IV diuresis, may need more frequently dependent on diuretic plan. Replace electrolytes as needed and monitor renal function  · Cardiology consultation appreciated. · On Bumex 5 mg 3 times daily transitioned to 5mg iv BID 10/8 - Continue per heart failure   · Monitor creatinine.   Continues to increase 1.91
Wt Readings from Last 3 Encounters:   10/10/23 (!) 152 kg (335 lb 1.6 oz)   10/05/23 (!) 158 kg (349 lb)   08/23/23 (!) 157 kg (346 lb)     Presenting with worsening shortness of breath, even at rest on admission. Reported increased lower extremity edema and at least 7 pound weight gain over the past 3-4 days prior to admission, despite dosing of metolazone. · Echocardiogram 4/11/2023 EF 14%, grade 2 diastolic dysfunction  · Home dose diuretic: Bumex 5 mg twice daily   · Heart failure service consult for further diuretic recommendations, patient follows with Dr. Duane Estelle.    · Currently on IV Bumex 5 mg BID  · Possible repeat RHC with Cardiomems recalibration in AM  · Continued on remainder of home cardiac regimen: Toprol-XL 50 mg daily, spironolactone 25 mg daily, Jardiance 10 mg daily   · Monitor daily weights, I/O, volume status  · Low-sodium, fluid strict diet   · Replace electrolytes as needed and monitor renal function
Wt Readings from Last 3 Encounters:   10/11/23 (!) 155 kg (342 lb 12.8 oz)   10/05/23 (!) 158 kg (349 lb)   08/23/23 (!) 157 kg (346 lb)     Presenting with worsening shortness of breath, even at rest on admission. Reported increased lower extremity edema and at least 7 pound weight gain over the past 3-4 days prior to admission, despite dosing of metolazone. Echocardiogram 4/11/2023 EF 92%, grade 2 diastolic dysfunction  Home dose diuretic: Bumex 5 mg twice daily   Heart failure service consult for further diuretic recommendations, patient follows with Dr. Daniela De Leon.    Transitioned to increased dose PO Bumex 6 mg twice daily today  Status post RHC with calibration of cardiomems on 10/11   Continued on remainder of home cardiac regimen: Toprol-XL 50 mg daily, spironolactone 25 mg daily, Jardiance 10 mg daily   Monitor daily weights, I/O, volume status  Low-sodium, fluid strict diet   Replace electrolytes as needed and monitor renal function
Wt Readings from Last 3 Encounters:   10/12/23 (!) 154 kg (340 lb 6.4 oz)   10/05/23 (!) 158 kg (349 lb)   08/23/23 (!) 157 kg (346 lb)   Presenting with worsening shortness of breath, even at rest on admission. Reported increased lower extremity edema and at least 7 pound weight gain over the past 3-4 days prior to admission, despite dosing of metolazone. Echocardiogram 4/11/2023 EF 99%, grade 2 diastolic dysfunction  Home dose diuretic: Bumex 5 mg twice daily   Heart failure service consult for further diuretic recommendations, patient follows with Dr. Finn Cooney.    Transitioned to increased dose PO Bumex 6 mg BID- plan to hold the next two doses due to increase in creatinine   Status post RHC with calibration of cardiomems on 10/11   Continued on remainder of home cardiac regimen: Toprol-XL 50 mg daily, spironolactone 25 mg daily, Jardiance 10 mg daily   Monitor daily weights, I/O, volume status  Low-sodium, fluid strict diet   Replace electrolytes as needed and monitor renal function
· Dietary and lifestyle modification advised.   Morbid obesity complicates all facets of care
· Does not appear in acute exacerbation  · Continue home inhalers/nebulizers/Singulair
· Does not appear in acute exacerbation, continue home inhalers/nebulizers/Singulair
· Not currently on CPAP as patient awaiting new device (current device recalled)  · Advised follow-up with prescribing physician
· Patient reporting nasal congestion at this time without fevers additionally  · COVID-19 PCR negative   · Saline nasal spray as needed
· Patient reporting nasal congestion at this time without fevers additionally  · Follow-up result of COVID-19 PCR which has been ordered. Patient is on room air at this time, if positive start remdesivir and escalate as needed.   If negative supportive care for symptoms as needed
· Patient reporting nasal congestion at this time without fevers additionally  · Follow-up result of COVID-19 PCR which has been ordered. Patient is on room air at this time, if positive start remdesivir and escalate as needed.   If negative supportive care for symptoms as needed  · Can write for saline nasal spray as needed
· Patient reporting nasal congestion at this time without fevers additionally  · Follow-up result of COVID-19 PCR which has been ordered. Patient is on room air at this time, if positive start remdesivir and escalate as needed.   If negative supportive care for symptoms as needed  · Can write for saline nasal spray as needed
· Rate controlled on metoprolol succinate, continue at home dosing  · Continue anticoagulation with Eliquis
Detail Level: Detailed
Quality 431: Preventive Care And Screening: Unhealthy Alcohol Use - Screening: Patient not identified as an unhealthy alcohol user when screened for unhealthy alcohol use using a systematic screening method

## 2023-10-12 NOTE — DISCHARGE SUMMARY
[address] [facility]  Discharge- Isiah Dozier 1965, 66 y.o. male MRN: [id number]  Unit/Bed#: -01 Encounter: [id number]  Primary Care Provider: Татьяна Cox MD   Date and time admitted to hospital: 10/6/2023  5:28 PM    * Acute on chronic diastolic congestive heart failure [facility])  Assessment & Plan  Wt Readings from Last 3 Encounters:   10/12/23 (!) 154 kg (340 lb 6.4 oz)   10/05/23 (!) 158 kg (349 lb)   08/23/23 (!) 157 kg (346 lb)   Presenting with worsening shortness of breath, even at rest on admission. Reported increased lower extremity edema and at least 7 pound weight gain over the past 3-4 days prior to admission, despite dosing of metolazone. Echocardiogram 4/11/2023 EF 73%, grade 2 diastolic dysfunction  Home dose diuretic: Bumex 5 mg twice daily   Heart failure service consult for further diuretic recommendations, patient follows with Dr. Samantha Treviño.    Transitioned to increased dose PO Bumex 6 mg BID- plan to hold the next two doses due to increase in creatinine   Status post RHC with calibration of cardiomems on 10/11   Continued on remainder of home cardiac regimen: Toprol-XL 50 mg daily, spironolactone 25 mg daily, Jardiance 10 mg daily   Monitor daily weights, I/O, volume status  Low-sodium, fluid strict diet   Replace electrolytes as needed and monitor renal function    Severe persistent asthma without complication  Assessment & Plan  Does not appear in acute exacerbation  Continue home inhalers/nebulizers/Singulair  Pulmonary inputs appreciated   PFTs reviewed and consistent with asthma/COPD overlap   C/w Spiriva, symbicort and fasenra   Ambulatory referral to pulmonary rehab on discharge   Outpatient follow-up scheduled    Nasal congestion  Assessment & Plan  Patient reported nasal congestion   COVID-19 PCR negative   C/w Flonase and Claritin     Stage 3a chronic kidney disease ([address])  Assessment & Plan  Lab Results   Component Value Date    EGFR 35 10/12/2023    EGFR 37 10/11/2023    EGFR 40 10/10/2023    CREATININE 2.03 (H) 10/12/2023    CREATININE 1.93 (H) 10/11/2023    CREATININE 1.84 (H) 10/10/2023   Baseline creatinine approximately 1.0-2.0  Diuresis per heart failure service as above   Monitor BMP- labs ordered to be repeated in close interim outpatient     Paroxysmal atrial fibrillation (HCC)  Assessment & Plan  Rate controlled on metoprolol succinate, continue at home dosing  Continue anticoagulation with Eliquis    VICKY (obstructive sleep apnea)  Assessment & Plan  Not currently on CPAP as patient awaiting new device (current device recalled)  Advised follow-up with prescribing physician    Morbid obesity due to excess calories [facility])  Assessment & Plan  Dietary and lifestyle modification advised. Morbid obesity complicates all facets of care    Type 2 diabetes mellitus, without long-term current use of insulin [facility])  Assessment & Plan  Lab Results   Component Value Date    HGBA1C 7.1 (A) 08/09/2023       Recent Labs     10/11/23  1704 10/11/23  2023 10/12/23  0545 10/12/23  1201   POCGLU 144* 240* 177* 231*         Blood Sugar Average: Last 72 hrs:  (P) [id number]   Reasonably well controlled as outpatient  Continue with Jardiance given heart failure- resume other oral medications at discharge.     Carb controlled diet        Medical Problems       Resolved Problems  Date Reviewed: 10/12/2023   None       Discharging Physician / Practitioner: RODRIGUE Ashley  PCP: Saywer Scales MD  Admission Date:   Admission Orders (From admission, onward)       Ordered        10/06/23 2149  INPATIENT ADMISSION  Once                          Discharge Date: 10/12/23    Consultations During Hospital Stay:  Heart failure   Pulmonology    Procedures Performed:   10/7/2023 chest x-ray-no acute cardiopulmonary disease  Lipid panel-cholesterol 170; triglycerides 146; HDL 55; LDL 86  COVID-negative  10/11/2023 right heart cath- The patient tolerated the procedure well; no immediate complications. Normal biv filling pressures (after inpt diuresis). Normal CO/CI. CardioMEMS data correlates to [facility]. Significant Findings / Test Results:   CHF exacerbation    Incidental Findings:   None    Test Results Pending at Discharge (will require follow up): None     Outpatient Tests Requested:  BMP     Complications: None    Reason for Admission: Shortness of breath    Hospital Course:   Shari Malhotra is a 66 y.o. male patient with a past medical history of HFimpEF with predominant HFpEF, paroxysmal A-fib on Eliquis, morbid obesity, severe persistent asthma, VICKY not currently on CPAP as device currently recalled, type 2 diabetes on oral medication, hypertension, CKD 3 who originally presented to the hospital on 10/6/2023 due to shortness of breath. The patient was admitted for CHF exacerbation and seen by heart failure team.  On arrival the patient's weight was documented at 349 pounds he is now 343 pounds after diuresis. His dry weight had been 340 pounds recently though he was consistently 320s in recent years. The patient will be discharged on increased dose of Bumex 6 mg twice daily. He was instructed to hold 2 doses of Bumex given a rise in his creatinine and he will have a repeat BMP outpatient and close outpatient follow-up with heart failure. Continue with GDMT at discharge. Please see above list of diagnoses and related plan for additional information. Condition at Discharge: good    Discharge Day Visit / Exam:   Subjective: Patient sitting on the edge of the bed. Denies any complaints today.   Reports that he feels like he is at his baseline  Vitals: Blood Pressure: 104/53 (10/12/23 0711)  Pulse: 77 (10/12/23 0711)  Temperature: 97.5 °F (36.4 °C) (10/12/23 0711)  Temp Source: Oral (10/12/23 0711)  Respirations: 18 (10/12/23 0711)  Height: 6' 1" (185.4 cm) (10/06/23 2237)  Weight - Scale: (!) 154 kg (340 lb 6.4 oz) (10/12/23 0623)  SpO2: 95 % (10/12/23 0731)  Exam:   Physical Exam  Constitutional:       General: He is not in acute distress. Appearance: He is morbidly obese. He is not ill-appearing. Cardiovascular:      Rate and Rhythm: Normal rate and regular rhythm. Pulses: Normal pulses. Heart sounds: Murmur heard. Pulmonary:      Effort: No respiratory distress. Breath sounds: Normal breath sounds. No wheezing or rales. Abdominal:      General: Bowel sounds are normal. There is distension. Palpations: Abdomen is soft. Tenderness: There is no abdominal tenderness. Musculoskeletal:         General: Swelling present. No tenderness. Right lower leg: Edema present. Left lower leg: Edema present. Skin:     General: Skin is warm and dry. Findings: No erythema or rash. Neurological:      General: No focal deficit present. Mental Status: He is alert. Mental status is at baseline. Psychiatric:         Attention and Perception: Attention normal.         Mood and Affect: Mood normal.          Discussion with Family: Patient declined call to . Discharge instructions/Information to patient and family:   See after visit summary for information provided to patient and family. Provisions for Follow-Up Care:  See after visit summary for information related to follow-up care and any pertinent home health orders. Disposition:   Home    Planned Readmission: no     Discharge Statement:  I spent 45 minutes discharging the patient. This time was spent on the day of discharge. I had direct contact with the patient on the day of discharge. Greater than 50% of the total time was spent examining patient, answering all patient questions, arranging and discussing plan of care with patient as well as directly providing post-discharge instructions. Additional time then spent on discharge activities.     Discharge Medications:  See after visit summary for reconciled discharge medications provided to patient and/or family.       **Please Note: This note may have been constructed using a voice recognition system**

## 2023-10-12 NOTE — PROGRESS NOTES
Heart Failure/ Pulmonary Hypertension Progress Note - Laurence Macdonald 66 y.o. male MRN: [id number]    Unit/Bed#: -01 Encounter: [id number]      Assessment:    Principal Problem:    Acute on chronic diastolic congestive heart failure (HCC)  Active Problems:    Type 2 diabetes mellitus, without long-term current use of insulin (HCC)    Morbid obesity due to excess calories (HCC)    VICKY (obstructive sleep apnea)    Paroxysmal atrial fibrillation (HCC)    Stage 3a chronic kidney disease (HCC)    Severe persistent asthma without complication    Nasal congestion      Subjective:   65 yo male with HFimpEF with predominant HFpEF now with multiple admits for volume overload. He is on high dose bumex 5 mg BID as outpt with prn metolazone. His morbid obesity complicates his treatment with BMI 46. Weight on admission up to 349 lbs (hospital records show weight around 314 lbs during an admit in 2022). Reported more SOB and more edema in legs - metolazone not of help as outpt. Has CardioMems, goal PAD 15. PAD 16 10/6, 14 10/5. Patient seen and examined. No significant events overnight. Objective: Intake/ Output: 778/3025/-2.2 L  Weight: 340, 342, 343, 349 on admit. Tele: NA  MAPs: 80-90 mmHg. Plan:  Acute on chronic HFpEF, Stage C, NYHA III. Difficult exam due to body habitus though now appears compensated. Discharge home today on escalated dose of Bumex 6 mg BID with PRN Metolazone. Volume management :   Home regimen: Bumex 6 mg PO BID, Jardiance 10 mg daily, Spironolactone 25 mg daily, Metolazone 2.5 mg PRN    RHC with Cardiomems calibration:  CardioMEMS rep Brittany Fortune was present throughout the case and provided the simultaneous MEMS interrogation data.      -140 during case  HR 74  O2 sat 97%  Hgb 10.5     RA 8  RV 30/8  PA 28/12, 17  PCWP 10-12 (multiple checks)  TPG 5  PA sat 59% (multiple checks)  Travis CO/CI: 6.6/2.3  TD CO/CI: 7.43/2.78     Simultaneous CardioMEMS data:  Mean: 26/10/15  End expiration on MEMS waveform: 28/10      Impression/Recommendations: The patient tolerated the procedure well; no immediate complications. Normal biv filling pressures (after inpt diuresis). Normal CO/CI. CardioMEMS data correlates to RHC.  s/p CardioMems implant 3/9/22, GOAL PAD 15-18  PAD 16 10/6, 14 10/5. BNP 10/6/23: 93  122  233      Studies      LHC 9/6/22: No obstructive CAD     Pharm Nuc Stress 9/2/22: Abnormal study due to the presence of an inferior and inferolateral infarct without significant ischemia. [facility] 3/9/22:   RA 4   RV 35/4   PA 35/12/21   PCWP 10   PA sat 64%   Travis CO 5.56 L/min   Travis CI 2.2L/min/m2   PVR 1.97 SAGASTUME      TTE 4/11/2023: LVEF 50%. Normal wall motion. Grade 2 DD. RV cavity size and systolic function normal.  LA mildly dilated. Trace MR, TR. Normal IVC. TTE 03/25/2020: LVEF 40-45%. LVIDd 6.12 cm. Normal RV. TTE 07/19/2021: LVEF 50%. LVIDd 6.13 cm. Grade 1 DD. Normal RV. Trace MR and TR. Mildly dilated IVC. TTE 11/12/2021: LVEF 55%. LVIDd 6.0 cm. Grade 1 DD. Normal RV. Trace TR. Atrial fibrillation   [id number] = 3 (HF, HTN, DM). Diagnosed 02/2022. Anticoagulation on Eliquis. Rate control: BB as above. Rhythm control: No.      Hypertension, controlled   Rx: amlodipine 5mg daily      Hyperlipidemia   11/22/22:   HDL 55 LDL 85  Rx: atorvastatin 10 mg daily. Diabetes mellitus, type II     Non-insulin dependent. HbA1c 8/9/23: 7.1%      Obstructive sleep apnea  Not currently using CPAP given machine recall. Chronic respiratory failure   Asthma, moderate persistent    S/p gastric bypass (Samuel-en-Y)surgery    History of tobacco abuse   Carpal tunnel syndrome, bilateral    CKD, recent baseline 1.5-2.0. Stable. Review of Systems   All other systems reviewed and are negative. [employer] (day, reason):   Cotton catheter (day, reason):    Vitals: Blood pressure 104/53, pulse 77, temperature 97.5 °F (36.4 °C), temperature source Oral, resp. rate 18, height 6' 1" (1.854 m), weight (!) 154 kg (340 lb 6.4 oz), SpO2 95 %. , Body mass index is 44.91 kg/m²., I/O last 3 completed shifts: In: 778 [P.O.:778]  Out: 3400 [Urine:3400]  I/O this shift:  In: 660 [P.O.:660]  Out: -   Wt Readings from Last 3 Encounters:   10/12/23 (!) 154 kg (340 lb 6.4 oz)   10/05/23 (!) 158 kg (349 lb)   08/23/23 (!) 157 kg (346 lb)       Intake/Output Summary (Last 24 hours) at 10/12/2023 0923  Last data filed at 10/12/2023 0855  Gross per 24 hour   Intake 1438 ml   Output 3025 ml   Net -1587 ml       I/O last 3 completed shifts:   In: 778 [P.O.:778]  Out: 3400 [Urine:3400]        Physical Exam:  Vitals:    10/11/23 1910 10/11/23 2202 10/12/23 0623 10/12/23 0711   BP: 136/79 132/75  104/53   BP Location:    Right arm   Pulse: 83 76  77   Resp: 18 20  18   Temp: 98.7 °F (37.1 °C) 98.2 °F (36.8 °C)  97.5 °F (36.4 °C)   TempSrc: Oral Oral  Oral   SpO2: 94% 96%  95%   Weight:   (!) 154 kg (340 lb 6.4 oz)    Height:           GEN: Mini Wei appears well, alert and oriented x 3, pleasant and cooperative   HEENT: pupils equal, round, and reactive to light; extraocular muscles intact  NECK: supple, no carotid bruits   HEART: regular rhythm, normal S1 and S2, no murmurs, clicks, gallops or rubs, no JVD  LUNGS: clear to auscultation bilaterally; no wheezes, rales, or rhonchi   ABDOMEN: normal bowel sounds, soft, no tenderness, no distention  EXTREMITIES: peripheral pulses normal; no clubbing, cyanosis, or edema  NEURO: no focal findings   SKIN: normal without suspicious lesions on exposed skin      Current Facility-Administered Medications:     albuterol (PROVENTIL HFA,VENTOLIN HFA) inhaler 2 puff, 2 puff, Inhalation, Q4H PRN, Ilda Carlton PA-C    apixaban (ELIQUIS) tablet 5 mg, 5 mg, Oral, BID, RODRIGUE Rodriges, 5 mg at 10/12/23 0905    atorvastatin (LIPITOR) tablet 10 mg, 10 mg, Oral, Daily, Ilda Carlton PA-C, 10 mg at 10/12/23 1528 budesonide-formoterol (SYMBICORT) 160-4.5 mcg/act inhaler 2 puff, 2 puff, Inhalation, BID, Ilda Carlton, PA-C, 2 puff at 10/12/23 0904    bumetanide (BUMEX) tablet 6 mg, 6 mg, Oral, BID, Capri Bowen, CRNP, 6 mg at 10/12/23 0904    Empagliflozin (JARDIANCE) tablet 10 mg, 10 mg, Oral, Daily, Ilda E Held, PA-C, 10 mg at 10/11/23 0939    fluticasone (FLONASE) 50 mcg/act nasal spray 1 spray, 1 spray, Each Nare, BID, Ilda Carlton, PA-C, 1 spray at 10/12/23 0904    insulin lispro (HumaLOG) 100 units/mL subcutaneous injection 1-6 Units, 1-6 Units, Subcutaneous, HS, Ilda E Held, PA-C, 3 Units at 10/11/23 2119    insulin lispro (HumaLOG) 100 units/mL subcutaneous injection 2-12 Units, 2-12 Units, Subcutaneous, TID AC, 2 Units at 10/12/23 0905 **AND** Fingerstick Glucose (POCT), , , TID AC, Ilda E Bianka, PA-C    insulin lispro (HumaLOG) 100 units/mL subcutaneous injection 5 Units, 5 Units, Subcutaneous, TID With Meals, Ilda E Bianka, PA-C, 5 Units at 10/12/23 0907    loratadine (CLARITIN) tablet 10 mg, 10 mg, Oral, Daily, Ilda E Held, PA-C, 10 mg at 10/12/23 0905    melatonin tablet 6 mg, 6 mg, Oral, HS, Ilda E Held, PA-C, 6 mg at 10/11/23 2119    metoprolol succinate (TOPROL-XL) 24 hr tablet 50 mg, 50 mg, Oral, Daily, Ilda E Held, PA-C, 50 mg at 10/12/23 0905    montelukast (SINGULAIR) tablet 10 mg, 10 mg, Oral, HS, Ilda E Held, PA-C, 10 mg at 10/11/23 2119    pantoprazole (PROTONIX) EC tablet 40 mg, 40 mg, Oral, Daily, Ilda E Held, PA-C, 40 mg at 10/12/23 0544    potassium chloride (K-DUR,KLOR-CON) CR tablet 40 mEq, 40 mEq, Oral, BID, Ilda E Held, PA-C, 40 mEq at 10/12/23 0905    pregabalin (LYRICA) capsule 50 mg, 50 mg, Oral, HS, Ilda E Held, PA-C, 50 mg at 10/11/23 2119    spironolactone (ALDACTONE) tablet 25 mg, 25 mg, Oral, Daily, Ilda E Held, PA-C, 25 mg at 10/12/23 0905    umeclidinium 62.5 mcg/actuation inhaler AEPB 1 puff, 1 puff, Inhalation, Daily, Ilda E Held, PA-C, 1 puff at 10/12/23 2406      Labs & Results:        Results from last 7 days   Lab Units 10/10/23  0604 10/09/23  0528 10/07/23  0544   WBC Thousand/uL 8.47 7.73 8.09   HEMOGLOBIN g/dL 10.5* 10.1* 9.8*   HEMATOCRIT % 33.9* 32.7* 32.3*   PLATELETS Thousands/uL 277 257 249           Results from last 7 days   Lab Units 10/12/23  0501 10/11/23  0522 10/10/23  0604 10/09/23  0528   POTASSIUM mmol/L 3.9 3.6 3.4* 3.3*   CHLORIDE mmol/L 97 96 95* 94*   CO2 mmol/L 29 30 30 31   BUN mg/dL 35* 34* 32* 29*   CREATININE mg/dL 2.03* 1.93* 1.84* 1.91*   CALCIUM mg/dL 8.4 8.4 8.1* 8.2*   ALK PHOS U/L  --  85 94 90   ALT U/L  --  128* 124* 88*   AST U/L  --  169* 213* 170*     Results from last 7 days   Lab Units 10/12/23  0501   INR  1.29*         Counseling / Coordination of Care  Total floor / unit time spent today 20 minutes. Greater than 50% of total time was spent with the patient and / or family counseling and / or coordination of care. A description of the counseling / coordination of care: 20. Thank you for the opportunity to participate in the care of this patient. Capri Ochoa

## 2023-10-12 NOTE — PROGRESS NOTES
Patient is admitted to [address] Outpatient Advanced Heart Failure LCSW completed chart review and rounded with the HF Team. Dr. Didi Dubois discussed medical condition with patient and recommended regimen. Anticipate pt discharge today. Referral for outpatient social work not entered at this time. Please enter referral if outpatient resources are needed in the future.

## 2023-10-13 NOTE — UTILIZATION REVIEW
NOTIFICATION OF ADMISSION DISCHARGE   This is a Notification of Discharge from [address] [facility]. Please be advised that this patient has been discharge from our facility. Below you will find the admission and discharge date and time including the patient’s disposition. UTILIZATION REVIEW CONTACT:  Maureen Rawls  Utilization   Network Utilization Review Department  Phone: [phone] x carefully listen to the prompts. All voicemails are confidential.  Email: [email or web address]     ADMISSION INFORMATION  PRESENTATION DATE: 10/6/2023  5:28 PM  OBERVATION ADMISSION DATE:   INPATIENT ADMISSION DATE: 10/6/23  9:49 PM   DISCHARGE DATE: 10/12/2023  2:21 PM   DISPOSITION:Home/Self Care    Network Utilization Review Department  ATTENTION: Please call with any questions or concerns to [phone] and carefully listen to the prompts so that you are directed to the right person. All voicemails are confidential.   For Discharge needs, contact Care Management DC Support Team at [phone] opt. [phone]  Send all requests for admission clinical reviews, approved or denied determinations and any other requests to dedicated fax number below belonging to the campus where the patient is receiving treatment.  List of dedicated fax numbers for the Facilities:  [place] DENIALS (Administrative/Medical Necessity) [phone]   DISCHARGE SUPPORT TEAM (Network) [phone] [address] [place] (Maternity/NICU/Pediatrics) [phone]   [address] [facility] [address] [place] [address] [phone]   [facility] [address] [facility] [phone]   [address] [place] [address]  [address]  [phone]

## 2023-10-16 ENCOUNTER — APPOINTMENT (EMERGENCY)
Dept: RADIOLOGY | Facility: HOSPITAL | Age: 58
DRG: 202 | End: 2023-10-16
Payer: COMMERCIAL

## 2023-10-16 ENCOUNTER — HOSPITAL ENCOUNTER (INPATIENT)
Facility: HOSPITAL | Age: 58
LOS: 4 days | Discharge: HOME | DRG: 202 | End: 2023-10-20
Attending: EMERGENCY MEDICINE | Admitting: INTERNAL MEDICINE
Payer: COMMERCIAL

## 2023-10-16 DIAGNOSIS — I10 HTN (HYPERTENSION): ICD-10-CM

## 2023-10-16 DIAGNOSIS — J44.1 COPD WITH ACUTE EXACERBATION (HCC): Primary | ICD-10-CM

## 2023-10-16 DIAGNOSIS — J45.901 ASTHMA EXACERBATION: ICD-10-CM

## 2023-10-16 DIAGNOSIS — E11.9 DM (DIABETES MELLITUS) (HCC): ICD-10-CM

## 2023-10-16 DIAGNOSIS — E78.5 HLD (HYPERLIPIDEMIA): ICD-10-CM

## 2023-10-16 DIAGNOSIS — D72.10 EOSINOPHILIA: ICD-10-CM

## 2023-10-16 DIAGNOSIS — I48.91 A-FIB (HCC): ICD-10-CM

## 2023-10-16 DIAGNOSIS — I50.9 CHF (CONGESTIVE HEART FAILURE) (HCC): ICD-10-CM

## 2023-10-16 PROBLEM — N18.30 CKD (CHRONIC KIDNEY DISEASE) STAGE 3, GFR 30-59 ML/MIN (HCC): Status: ACTIVE | Noted: 2022-09-16

## 2023-10-16 PROBLEM — E11.42 TYPE 2 DIABETES MELLITUS WITH DIABETIC POLYNEUROPATHY, WITHOUT LONG-TERM CURRENT USE OF INSULIN (HCC): Status: ACTIVE | Noted: 2018-01-30

## 2023-10-16 PROBLEM — I50.32 CHRONIC DIASTOLIC CONGESTIVE HEART FAILURE (HCC): Status: ACTIVE | Noted: 2023-04-10

## 2023-10-16 PROBLEM — D63.8 ANEMIA OF CHRONIC DISEASE: Status: ACTIVE | Noted: 2023-10-16

## 2023-10-16 LAB
2HR DELTA HS TROPONIN: -3 NG/L
ALBUMIN SERPL BCP-MCNC: 3.6 G/DL (ref 3.5–5)
ALP SERPL-CCNC: 94 U/L (ref 34–104)
ALT SERPL W P-5'-P-CCNC: 83 U/L (ref 7–52)
ANION GAP SERPL CALCULATED.3IONS-SCNC: 11 MMOL/L
AST SERPL W P-5'-P-CCNC: 40 U/L (ref 13–39)
ATRIAL RATE: 99 BPM
BASOPHILS # BLD AUTO: 0.15 THOUSANDS/ÂΜL (ref 0–0.1)
BASOPHILS NFR BLD AUTO: 2 % (ref 0–1)
BILIRUB SERPL-MCNC: 0.7 MG/DL (ref 0.2–1)
BUN SERPL-MCNC: 23 MG/DL (ref 5–25)
CALCIUM SERPL-MCNC: 8.9 MG/DL (ref 8.4–10.2)
CARDIAC TROPONIN I PNL SERPL HS: 14 NG/L
CARDIAC TROPONIN I PNL SERPL HS: 17 NG/L
CHLORIDE SERPL-SCNC: 98 MMOL/L (ref 96–108)
CO2 SERPL-SCNC: 28 MMOL/L (ref 21–32)
CREAT SERPL-MCNC: 1.37 MG/DL (ref 0.6–1.3)
EOSINOPHIL # BLD AUTO: 2.1 THOUSAND/ÂΜL (ref 0–0.61)
EOSINOPHIL NFR BLD AUTO: 22 % (ref 0–6)
ERYTHROCYTE [DISTWIDTH] IN BLOOD BY AUTOMATED COUNT: 16.7 % (ref 11.6–15.1)
GFR SERPL CREATININE-BSD FRML MDRD: 57 ML/MIN/1.73SQ M
GLUCOSE SERPL-MCNC: 252 MG/DL (ref 65–140)
GLUCOSE SERPL-MCNC: 486 MG/DL (ref 65–140)
HCT VFR BLD AUTO: 35.3 % (ref 36.5–49.3)
HGB BLD-MCNC: 11.2 G/DL (ref 12–17)
IMM GRANULOCYTES # BLD AUTO: 0.08 THOUSAND/UL (ref 0–0.2)
IMM GRANULOCYTES NFR BLD AUTO: 1 % (ref 0–2)
LIPASE SERPL-CCNC: 66 U/L (ref 11–82)
LYMPHOCYTES # BLD AUTO: 1.23 THOUSANDS/ÂΜL (ref 0.6–4.47)
LYMPHOCYTES NFR BLD AUTO: 13 % (ref 14–44)
MCH RBC QN AUTO: 26 PG (ref 26.8–34.3)
MCHC RBC AUTO-ENTMCNC: 31.7 G/DL (ref 31.4–37.4)
MCV RBC AUTO: 82 FL (ref 82–98)
MONOCYTES # BLD AUTO: 0.73 THOUSAND/ÂΜL (ref 0.17–1.22)
MONOCYTES NFR BLD AUTO: 8 % (ref 4–12)
NEUTROPHILS # BLD AUTO: 5.46 THOUSANDS/ÂΜL (ref 1.85–7.62)
NEUTS SEG NFR BLD AUTO: 54 % (ref 43–75)
NRBC BLD AUTO-RTO: 0 /100 WBCS
PLATELET # BLD AUTO: 309 THOUSANDS/UL (ref 149–390)
PMV BLD AUTO: 10.8 FL (ref 8.9–12.7)
POTASSIUM SERPL-SCNC: 3.7 MMOL/L (ref 3.5–5.3)
PROT SERPL-MCNC: 7.1 G/DL (ref 6.4–8.4)
QRS AXIS: 24 DEGREES
QRSD INTERVAL: 76 MS
QT INTERVAL: 364 MS
QTC INTERVAL: 435 MS
RBC # BLD AUTO: 4.31 MILLION/UL (ref 3.88–5.62)
SODIUM SERPL-SCNC: 137 MMOL/L (ref 135–147)
T WAVE AXIS: 64 DEGREES
VENTRICULAR RATE: 86 BPM
WBC # BLD AUTO: 9.75 THOUSAND/UL (ref 4.31–10.16)

## 2023-10-16 PROCEDURE — 99285 EMERGENCY DEPT VISIT HI MDM: CPT | Performed by: EMERGENCY MEDICINE

## 2023-10-16 PROCEDURE — 74177 CT ABD & PELVIS W/CONTRAST: CPT

## 2023-10-16 PROCEDURE — 99223 1ST HOSP IP/OBS HIGH 75: CPT | Performed by: INTERNAL MEDICINE

## 2023-10-16 PROCEDURE — 80053 COMPREHEN METABOLIC PANEL: CPT

## 2023-10-16 PROCEDURE — 83690 ASSAY OF LIPASE: CPT

## 2023-10-16 PROCEDURE — 84484 ASSAY OF TROPONIN QUANT: CPT

## 2023-10-16 PROCEDURE — 94760 N-INVAS EAR/PLS OXIMETRY 1: CPT

## 2023-10-16 PROCEDURE — 85025 COMPLETE CBC W/AUTO DIFF WBC: CPT

## 2023-10-16 PROCEDURE — 71260 CT THORAX DX C+: CPT

## 2023-10-16 PROCEDURE — 96374 THER/PROPH/DIAG INJ IV PUSH: CPT

## 2023-10-16 PROCEDURE — 36415 COLL VENOUS BLD VENIPUNCTURE: CPT

## 2023-10-16 PROCEDURE — 94640 AIRWAY INHALATION TREATMENT: CPT

## 2023-10-16 PROCEDURE — 82948 REAGENT STRIP/BLOOD GLUCOSE: CPT

## 2023-10-16 PROCEDURE — G1004 CDSM NDSC: HCPCS

## 2023-10-16 PROCEDURE — 94664 DEMO&/EVAL PT USE INHALER: CPT

## 2023-10-16 PROCEDURE — 93005 ELECTROCARDIOGRAM TRACING: CPT

## 2023-10-16 PROCEDURE — 93010 ELECTROCARDIOGRAM REPORT: CPT | Performed by: INTERNAL MEDICINE

## 2023-10-16 PROCEDURE — 99285 EMERGENCY DEPT VISIT HI MDM: CPT

## 2023-10-16 RX ORDER — INSULIN LISPRO 100 [IU]/ML
2-12 INJECTION, SOLUTION INTRAVENOUS; SUBCUTANEOUS
Status: DISCONTINUED | OUTPATIENT
Start: 2023-10-16 | End: 2023-10-20 | Stop reason: HOSPADM

## 2023-10-16 RX ORDER — LEVALBUTEROL INHALATION SOLUTION 1.25 MG/3ML
1.25 SOLUTION RESPIRATORY (INHALATION)
Status: DISCONTINUED | OUTPATIENT
Start: 2023-10-16 | End: 2023-10-17

## 2023-10-16 RX ORDER — SPIRONOLACTONE 25 MG/1
25 TABLET ORAL DAILY
Status: DISCONTINUED | OUTPATIENT
Start: 2023-10-17 | End: 2023-10-20 | Stop reason: HOSPADM

## 2023-10-16 RX ORDER — ONDANSETRON 2 MG/ML
4 INJECTION INTRAMUSCULAR; INTRAVENOUS EVERY 4 HOURS PRN
Status: DISCONTINUED | OUTPATIENT
Start: 2023-10-16 | End: 2023-10-20 | Stop reason: HOSPADM

## 2023-10-16 RX ORDER — FLUTICASONE PROPIONATE 50 MCG
1 SPRAY, SUSPENSION (ML) NASAL 2 TIMES DAILY
Status: DISCONTINUED | OUTPATIENT
Start: 2023-10-16 | End: 2023-10-20 | Stop reason: HOSPADM

## 2023-10-16 RX ORDER — SODIUM CHLORIDE FOR INHALATION 0.9 %
3 VIAL, NEBULIZER (ML) INHALATION ONCE
Status: COMPLETED | OUTPATIENT
Start: 2023-10-16 | End: 2023-10-16

## 2023-10-16 RX ORDER — ATORVASTATIN CALCIUM 10 MG/1
10 TABLET, FILM COATED ORAL
Status: DISCONTINUED | OUTPATIENT
Start: 2023-10-16 | End: 2023-10-20 | Stop reason: HOSPADM

## 2023-10-16 RX ORDER — LORATADINE 10 MG/1
10 TABLET ORAL DAILY
Status: DISCONTINUED | OUTPATIENT
Start: 2023-10-17 | End: 2023-10-20 | Stop reason: HOSPADM

## 2023-10-16 RX ORDER — METOPROLOL SUCCINATE 50 MG/1
50 TABLET, EXTENDED RELEASE ORAL DAILY
Status: DISCONTINUED | OUTPATIENT
Start: 2023-10-17 | End: 2023-10-20 | Stop reason: HOSPADM

## 2023-10-16 RX ORDER — PANTOPRAZOLE SODIUM 40 MG/1
40 TABLET, DELAYED RELEASE ORAL DAILY
Status: DISCONTINUED | OUTPATIENT
Start: 2023-10-17 | End: 2023-10-20 | Stop reason: HOSPADM

## 2023-10-16 RX ORDER — METHYLPREDNISOLONE SODIUM SUCCINATE 40 MG/ML
40 INJECTION, POWDER, LYOPHILIZED, FOR SOLUTION INTRAMUSCULAR; INTRAVENOUS EVERY 8 HOURS SCHEDULED
Status: DISCONTINUED | OUTPATIENT
Start: 2023-10-17 | End: 2023-10-16

## 2023-10-16 RX ORDER — BUMETANIDE 2 MG/1
6 TABLET ORAL 2 TIMES DAILY
Status: DISCONTINUED | OUTPATIENT
Start: 2023-10-16 | End: 2023-10-20 | Stop reason: HOSPADM

## 2023-10-16 RX ORDER — LEVALBUTEROL INHALATION SOLUTION 1.25 MG/3ML
1.25 SOLUTION RESPIRATORY (INHALATION) EVERY 6 HOURS PRN
Status: DISCONTINUED | OUTPATIENT
Start: 2023-10-16 | End: 2023-10-20 | Stop reason: HOSPADM

## 2023-10-16 RX ORDER — PREGABALIN 50 MG/1
50 CAPSULE ORAL
Status: DISCONTINUED | OUTPATIENT
Start: 2023-10-16 | End: 2023-10-20 | Stop reason: HOSPADM

## 2023-10-16 RX ORDER — ALBUTEROL SULFATE 90 UG/1
2 AEROSOL, METERED RESPIRATORY (INHALATION) EVERY 4 HOURS PRN
Status: DISCONTINUED | OUTPATIENT
Start: 2023-10-16 | End: 2023-10-16

## 2023-10-16 RX ORDER — MAGNESIUM SULFATE HEPTAHYDRATE 40 MG/ML
2 INJECTION, SOLUTION INTRAVENOUS ONCE
Status: COMPLETED | OUTPATIENT
Start: 2023-10-16 | End: 2023-10-16

## 2023-10-16 RX ORDER — HYDRALAZINE HYDROCHLORIDE 20 MG/ML
5 INJECTION INTRAMUSCULAR; INTRAVENOUS EVERY 6 HOURS PRN
Status: DISCONTINUED | OUTPATIENT
Start: 2023-10-16 | End: 2023-10-20 | Stop reason: HOSPADM

## 2023-10-16 RX ORDER — IPRATROPIUM BROMIDE AND ALBUTEROL SULFATE .5; 3 MG/3ML; MG/3ML
1 SOLUTION RESPIRATORY (INHALATION) ONCE
Status: COMPLETED | OUTPATIENT
Start: 2023-10-16 | End: 2023-10-16

## 2023-10-16 RX ORDER — METHYLPREDNISOLONE SODIUM SUCCINATE 40 MG/ML
40 INJECTION, POWDER, LYOPHILIZED, FOR SOLUTION INTRAMUSCULAR; INTRAVENOUS EVERY 8 HOURS SCHEDULED
Status: DISCONTINUED | OUTPATIENT
Start: 2023-10-16 | End: 2023-10-18

## 2023-10-16 RX ORDER — ACETAMINOPHEN 325 MG/1
650 TABLET ORAL EVERY 6 HOURS PRN
Status: DISCONTINUED | OUTPATIENT
Start: 2023-10-16 | End: 2023-10-20 | Stop reason: HOSPADM

## 2023-10-16 RX ORDER — METHYLPREDNISOLONE SODIUM SUCCINATE 125 MG/2ML
125 INJECTION, POWDER, LYOPHILIZED, FOR SOLUTION INTRAMUSCULAR; INTRAVENOUS ONCE
Status: COMPLETED | OUTPATIENT
Start: 2023-10-16 | End: 2023-10-16

## 2023-10-16 RX ADMIN — LEVALBUTEROL HYDROCHLORIDE 1.25 MG: 1.25 SOLUTION RESPIRATORY (INHALATION) at 14:31

## 2023-10-16 RX ADMIN — FLUTICASONE PROPIONATE 1 SPRAY: 50 SPRAY, METERED NASAL at 21:28

## 2023-10-16 RX ADMIN — Medication 3 ML: at 08:20

## 2023-10-16 RX ADMIN — APIXABAN 5 MG: 5 TABLET, FILM COATED ORAL at 21:28

## 2023-10-16 RX ADMIN — MAGNESIUM SULFATE HEPTAHYDRATE 2 G: 40 INJECTION, SOLUTION INTRAVENOUS at 14:29

## 2023-10-16 RX ADMIN — ATORVASTATIN CALCIUM 10 MG: 10 TABLET, FILM COATED ORAL at 17:41

## 2023-10-16 RX ADMIN — EMPAGLIFLOZIN 10 MG: 10 TABLET, FILM COATED ORAL at 12:40

## 2023-10-16 RX ADMIN — METHYLPREDNISOLONE SODIUM SUCCINATE 40 MG: 40 INJECTION, POWDER, FOR SOLUTION INTRAMUSCULAR; INTRAVENOUS at 17:41

## 2023-10-16 RX ADMIN — BUMETANIDE 6 MG: 2 TABLET ORAL at 21:27

## 2023-10-16 RX ADMIN — LEVALBUTEROL HYDROCHLORIDE 1.25 MG: 1.25 SOLUTION RESPIRATORY (INHALATION) at 19:49

## 2023-10-16 RX ADMIN — ALBUTEROL SULFATE 10 MG: 2.5 SOLUTION RESPIRATORY (INHALATION) at 08:20

## 2023-10-16 RX ADMIN — IPRATROPIUM BROMIDE 1 MG: 0.5 SOLUTION RESPIRATORY (INHALATION) at 08:20

## 2023-10-16 RX ADMIN — INSULIN LISPRO 12 UNITS: 100 INJECTION, SOLUTION INTRAVENOUS; SUBCUTANEOUS at 21:53

## 2023-10-16 RX ADMIN — PREGABALIN 50 MG: 50 CAPSULE ORAL at 21:28

## 2023-10-16 RX ADMIN — IOHEXOL 100 ML: 350 INJECTION, SOLUTION INTRAVENOUS at 09:43

## 2023-10-16 RX ADMIN — METHYLPREDNISOLONE SODIUM SUCCINATE 125 MG: 125 INJECTION, POWDER, FOR SOLUTION INTRAMUSCULAR; INTRAVENOUS at 10:24

## 2023-10-16 NOTE — RESPIRATORY THERAPY NOTE
RT Protocol Note  Natacha Moore 66 y.o. male MRN: [id number]  Unit/Bed#: ED 25 Encounter: [id number]    Assessment    Principal Problem:    Asthma exacerbation  Active Problems:    Chronic diastolic CHF ([address])      Home Pulmonary Medications:    Home Devices/Therapy: (P)  (xopenex/albuterol mdi/symbicort/spiriva)    Past Medical History:   Diagnosis Date    Acute gastritis without hemorrhage     last assessed 3/24/17    Asthma     Atrial fibrillation (HCC)     Bilateral leg edema 2020    CHF (congestive heart failure) (HCC)     Coronary artery disease     Daytime sleepiness 2019    Diabetes mellitus (HCC)     Dyspnea on exertion 10/11/2021    Edema of both feet 2020    Gastric bypass status for obesity     Hyperlipidemia     Hypertension     Hypokalemia 2021    Impaired fasting glucose     last assessed 5/10/17    Knee sprain, bilateral 2018    Leg cramps 2022    Obesity     Viral gastroenteritis     last assessed 16     Social History     Socioeconomic History    Marital status: /Civil Union     Spouse name: Not on file    Number of children: Not on file    Years of education: Not on file    Highest education level: Not on file   Occupational History    Not on file   Tobacco Use    Smoking status: Former     Types: Pipe, Cigars     Start date:      Quit date: 2021     Years since quittin.7    Smokeless tobacco: Never    Tobacco comments:     cigar once a day   Vaping Use    Vaping Use: Never used   Substance and Sexual Activity    Alcohol use:  Yes     Alcohol/week: 2.0 standard drinks of alcohol     Types: 2 Shots of liquor per week     Comment: social    Drug use: No    Sexual activity: Yes     Partners: Female     Birth control/protection: None   Other Topics Concern    Not on file   Social History Narrative    Not on file     Social Determinants of Health     Financial Resource Strain: Not on file   Food Insecurity: No Food Insecurity (10/10/2023) Hunger Vital Sign     Worried About Running Out of Food in the Last Year: Never true     Ran Out of Food in the Last Year: Never true   Transportation Needs: No Transportation Needs (10/10/2023)    PRAPARE - Transportation     Lack of Transportation (Medical): No     Lack of Transportation (Non-Medical): No   Physical Activity: Not on file   Stress: Not on file   Social Connections: Not on file   Intimate Partner Violence: Not on file   Housing Stability: Low Risk  (10/10/2023)    Housing Stability Vital Sign     Unable to Pay for Housing in the Last Year: No     Number of Places Lived in the Last Year: 1     Unstable Housing in the Last Year: No       Subjective         Objective    Physical Exam:   Assessment Type: (P) Post-treatment  General Appearance: (P) Alert, Awake  Respiratory Pattern: (P) Tachypneic  Chest Assessment: (P) Chest expansion symmetrical  Bilateral Breath Sounds: (P) Expiratory wheezes, Inspiratory wheezes  Cough: (P) None  O2 Device: (P) 1 lpm nc    Vitals:  Blood pressure 159/70, pulse 86, temperature 98.2 °F (36.8 °C), temperature source Oral, resp. rate 18, SpO2 (P) 97 %. Imaging and other studies: I have personally reviewed pertinent reports. O2 Device: (P) 1 lpm nc     Plan    Respiratory Plan: (P) Home Bronchodilator Patient pathway        Resp Comments: (P) Patient assessed for resp protocol, DX: sob, HX: asthma, pt admitted for sob, pt was just D/C last thursday per pt, BS exp wheezes, pt on 1 lpm nc satting 97%, pt takes resp medication at home, pos smoking hx- 1 cigar per day for 2 years per pt, he states he has quit,will order 1.25 mg xopenex TID and Q6 prn at this time, will conitnue to monitor and assess per protocol.

## 2023-10-16 NOTE — ASSESSMENT & PLAN NOTE
Persistently worsening shortness of breath with wheezing over the last several days acutely worsening over the last 24 hours (was recently admitted with hospital discharge on 10/12/23)  Loaded with IV Solu-Medrol in the ED -> continue 40 mg TID regimen w/ subsequent oral tapering with symptomatic improvement  Continue inhaled corticosteroid and Xopenex nebulization  Trial IV magnesium administration to augment wheezing sensation  Due to severity of chronic asthma, Fasenra injections every 8 weeks (last received 2 weeks ago)  CT of chest negative for acute etiology/changes  Follows outpatient pulmonology -> if condition persists/worsens on above treatment, consider inpatient consultation

## 2023-10-16 NOTE — PLAN OF CARE
Problem: Potential for Falls  Goal: Patient will remain free of falls  Description: INTERVENTIONS:  - Educate patient/family on patient safety including physical limitations  - Instruct patient to call for assistance with activity   - Consult OT/PT to assist with strengthening/mobility   - Keep Call bell within reach  - Keep bed low and locked with side rails adjusted as appropriate  - Keep care items and personal belongings within reach  - Initiate and maintain comfort rounds  - Make Fall Risk Sign visible to staff  - Offer Toileting every  Hours, in advance of need  - Initiate/Maintain alarm  - Obtain necessary fall risk management equipment:   - Apply yellow socks and bracelet for high fall risk patients  - Consider moving patient to room near nurses station  Outcome: Progressing     Problem: PAIN - ADULT  Goal: Verbalizes/displays adequate comfort level or baseline comfort level  Description: Interventions:  - Encourage patient to monitor pain and request assistance  - Assess pain using appropriate pain scale  - Administer analgesics based on type and severity of pain and evaluate response  - Implement non-pharmacological measures as appropriate and evaluate response  - Consider cultural and social influences on pain and pain management  - Notify physician/advanced practitioner if interventions unsuccessful or patient reports new pain  Outcome: Progressing     Problem: INFECTION - ADULT  Goal: Absence or prevention of progression during hospitalization  Description: INTERVENTIONS:  - Assess and monitor for signs and symptoms of infection  - Monitor lab/diagnostic results  - Monitor all insertion sites, i.e. indwelling lines, tubes, and drains  - Monitor endotracheal if appropriate and nasal secretions for changes in amount and color  - Devon appropriate cooling/warming therapies per order  - Administer medications as ordered  - Instruct and encourage patient and family to use good hand hygiene technique  - Identify and instruct in appropriate isolation precautions for identified infection/condition  Outcome: Progressing  Goal: Absence of fever/infection during neutropenic period  Description: INTERVENTIONS:  - Monitor WBC    Outcome: Progressing     Problem: SAFETY ADULT  Goal: Patient will remain free of falls  Description: INTERVENTIONS:  - Educate patient/family on patient safety including physical limitations  - Instruct patient to call for assistance with activity   - Consult OT/PT to assist with strengthening/mobility   - Keep Call bell within reach  - Keep bed low and locked with side rails adjusted as appropriate  - Keep care items and personal belongings within reach  - Initiate and maintain comfort rounds  - Make Fall Risk Sign visible to staff  - Offer Toileting every  Hours, in advance of need  - Initiate/Maintain alarm  - Obtain necessary fall risk management equipment:   - Apply yellow socks and bracelet for high fall risk patients  - Consider moving patient to room near nurses station  Outcome: Progressing  Goal: Maintain or return to baseline ADL function  Description: INTERVENTIONS:  - Educate patient/family on patient safety including physical limitations  - Instruct patient to call for assistance with activity   - Consult OT/PT to assist with strengthening/mobility   - Keep Call bell within reach  - Keep bed low and locked with side rails adjusted as appropriate  - Keep care items and personal belongings within reach  - Initiate and maintain comfort rounds  - Make Fall Risk Sign visible to staff  - Offer Toileting every  Hours, in advance of need  - Initiate/Maintain alarm  - Obtain necessary fall risk management equipment  - Apply yellow socks and bracelet for high fall risk patients  - Consider moving patient to room near nurses station  Outcome: Progressing  Goal: Maintains/Returns to pre admission functional level  Description: INTERVENTIONS:  -  Assess patient's ability to carry out ADLs; assess patient's baseline for ADL function and identify physical deficits which impact ability to perform ADLs (bathing, care of mouth/teeth, toileting, grooming, dressing, etc.)  - Assess/evaluate cause of self-care deficits   - Assess range of motion  - Assess patient's mobility; develop plan if impaired  - Assess patient's need for assistive devices and provide as appropriate  - Encourage maximum independence but intervene and supervise when necessary  - Involve family in performance of ADLs  - Assess for home care needs following discharge   - Consider OT consult to assist with ADL evaluation and planning for discharge  - Provide patient education as appropriate  Outcome: Progressing     Problem: DISCHARGE PLANNING  Goal: Discharge to home or other facility with appropriate resources  Description: INTERVENTIONS:  - Identify barriers to discharge w/patient and caregiver  - Arrange for needed discharge resources and transportation as appropriate  - Identify discharge learning needs (meds, wound care, etc.)  - Arrange for interpretive services to assist at discharge as needed  - Refer to Case Management Department for coordinating discharge planning if the patient needs post-hospital services based on physician/advanced practitioner order or complex needs related to functional status, cognitive ability, or social support system  Outcome: Progressing     Problem: Knowledge Deficit  Goal: Patient/family/caregiver demonstrates understanding of disease process, treatment plan, medications, and discharge instructions  Description: Complete learning assessment and assess knowledge base.   Interventions:  - Provide teaching at level of understanding  - Provide teaching via preferred learning methods  Outcome: Progressing     Problem: CARDIOVASCULAR - ADULT  Goal: Maintains optimal cardiac output and hemodynamic stability  Description: INTERVENTIONS:  - Monitor I/O, vital signs and rhythm  - Monitor for S/S and trends of decreased cardiac output  - Administer and titrate ordered vasoactive medications to optimize hemodynamic stability  - Assess quality of pulses, skin color and temperature  - Assess for signs of decreased coronary artery perfusion  - Instruct patient to report change in severity of symptoms  Outcome: Progressing  Goal: Absence of cardiac dysrhythmias or at baseline rhythm  Description: INTERVENTIONS:  - Continuous cardiac monitoring, vital signs, obtain 12 lead EKG if ordered  - Administer antiarrhythmic and heart rate control medications as ordered  - Monitor electrolytes and administer replacement therapy as ordered  Outcome: Progressing     Problem: RESPIRATORY - ADULT  Goal: Achieves optimal ventilation and oxygenation  Description: INTERVENTIONS:  - Assess for changes in respiratory status  - Assess for changes in mentation and behavior  - Position to facilitate oxygenation and minimize respiratory effort  - Oxygen administered by appropriate delivery if ordered  - Initiate smoking cessation education as indicated  - Encourage broncho-pulmonary hygiene including cough, deep breathe, Incentive Spirometry  - Assess the need for suctioning and aspirate as needed  - Assess and instruct to report SOB or any respiratory difficulty  - Respiratory Therapy support as indicated  Outcome: Progressing

## 2023-10-16 NOTE — H&P
[facility]  H&P  Name: Amita Galvan 66 y.o. male I MRN: [id number]  Unit/Bed#: ED 25 I Date of Admission: 10/16/2023   Date of Service: 10/16/2023 I Hospital Day: 0        Assessment & Plan:    * Asthma exacerbation  Assessment & Plan  Persistently worsening shortness of breath with wheezing over the last several days acutely worsening over the last 24 hours (was recently admitted with hospital discharge on 10/12/23)  Loaded with IV Solu-Medrol in the ED -> continue 40 mg TID regimen w/ subsequent oral tapering with symptomatic improvement  Continue inhaled corticosteroid and Xopenex nebulization  Trial IV magnesium administration to augment wheezing sensation  Due to severity of chronic asthma, Fasenra injections every 8 weeks (last received 2 weeks ago)  CT of chest negative for acute etiology/changes  Follows outpatient pulmonology -> if condition persists/worsens on above treatment, consider inpatient consultation    Chronic diastolic CHF [facility])  Assessment & Plan  Recent hospital admission w/ discharge earlier this month on 10/12/23 noted for CHF exacerbation  Last EF of 50% in April 2023 - s/p CardioMEMS placement in March 2023  [address] outpatient cardiology  Continue Bumex/Aldactone for diuresis and Toprol-XL for beta-blockade  Low-sodium/fluid restriction  Monitor/replete magnesium/potassium deficiencies if present    Type 2 diabetes mellitus with diabetic polyneuropathy ([address])  Assessment & Plan  Lab Results   Component Value Date    HGBA1C 7.1 (A) 08/09/2023     Januvia held while hospitalized - continued Jardiance in the setting of known heart failure  Initiated SSI coverage per Accu-Cheks  Carbohydrate restriction  Hypoglycemia protocol    CKD (chronic kidney disease) stage 3 (HCC)  Assessment & Plan  Baseline creatinine of approximately 1.3-1.5 -> presents stable at 1.37  Monitor renal function and urine output  Limit/avoid nephrotoxins as possible  Note chronic Bumex/Aldactone use    Morbid obesity ([address])  Assessment & Plan  BMI of 44.91  S/p prior gastric bypass   Lifestyle/diet modifications    Paroxysmal atrial fibrillation (HCC)  Assessment & Plan  Rate controlled on Toprol-XL  On Eliquis for anticoagulation    Hyperlipidemia  Assessment & Plan  Continue statin    Primary hypertension  Assessment & Plan  Continue Aldactone/Toprol-XL/Bumex  Additional PRN IV Hydralazine on board for BP spikes  Low-sodium diet    Anemia of chronic disease  Assessment & Plan  Monitor H/H      DVT Prophylaxis:  Eliquis    Code Status:  Full code    Discussion with:  Patient at bedside    Anticipated Length of Stay:  Patient will be admitted on an Inpatient basis with an anticipated length of stay of greater than 2 midnights. Justification for Hospital Stay: Asthma exacerbation requiring intravenous steroids, continuous nebulizer treatments, and pulmonary monitoring. Total Time for Visit, including Counseling / Coordination of Care: 75 minutes. More than 50% of total time spent on counseling and coordination of care, on one or more of the following: performing physical exam; counseling and coordination of care; obtaining or reviewing history; documenting in the medical record; reviewing/ordering tests, medications or procedures; communicating with other healthcare professionals and discussing with patient's family/caregivers. Chief Complaint:  Shortness of breath and wheezing      History of Present Illness:    Steffi Montgomery is a 66 y.o. male who presents with complaints of progressive worsening of shortness of breath with wheezing. Of note, he was recently hospitalized and subsequent discharged just 4 days ago on 10/12/23, due to a CHF exacerbation. Patient reports after going home, he started to become short of breath again, however, this time with different symptoms than his recent hospitalization.   He reported an increase in dry coughing with intractable wheezing episodes at times.  In the ED, an exacerbation of his chronic asthma was suspected, and he was loaded with IV Solu-Medrol, after which he reports some mild improvement. At the time my encounter he is resting in bed sitting upright fairly comfortably requiring 2 L of oxygen via nasal cannula. He is compliant with his home regimen. Reports due to severity and chronicity of his asthma, he undergoes Fasenra injections every 8 weeks, his last dose being just 2 weeks ago. Denies any fever/chills at this time. Overall, he remains in hopeful spirits, does express frustration over having to be readmitted to the hospital just after being discharged last week. Review of Systems:    Review of Systems - A thorough 12 point review systems was conducted. Pertinent positives and negatives are mentioned in the history of present illness. Past Medical and Surgical History:     Past Medical History:   Diagnosis Date    Acute gastritis without hemorrhage     last assessed 3/24/17    Asthma     Atrial fibrillation ([address])     Bilateral leg edema 02/19/2020    CHF (congestive heart failure) (HCC)     Coronary artery disease     Daytime sleepiness 07/17/2019    Diabetes mellitus ([address])     Dyspnea on exertion 10/11/2021    Edema of both feet 01/23/2020    Gastric bypass status for obesity     Hyperlipidemia     Hypertension     Hypokalemia 11/14/2021    Impaired fasting glucose     last assessed 5/10/17    Knee sprain, bilateral 06/14/2018    Leg cramps 06/16/2022    Obesity     Viral gastroenteritis     last assessed 11/4/16       Past Surgical History:   Procedure Laterality Date    CARDIAC CATHETERIZATION N/A 3/9/2022    Procedure: CARDIAC RHC W/ PRESSURE SENSOR;  Surgeon: Yohan Aguirre MD;  Location: [facility]; Service: Cardiology    CARDIAC CATHETERIZATION N/A 9/6/2022    Procedure: Cardiac catheterization;  Surgeon: Fidelina Heart DO;  Location: [facility];   Service: Cardiology    CARDIAC CATHETERIZATION N/A 9/6/2022    Procedure: Cardiac Coronary Angiogram;  Surgeon: Stephanie Ashford DO;  Location: [facility]; Service: Cardiology    CARDIAC CATHETERIZATION N/A 10/11/2023    Procedure: Cardiac RHC; Surgeon: Meghna Higginbotham MD;  Location: [facility]; Service: Cardiology    CARPAL TUNNEL RELEASE      bilateral    GASTRIC BYPASS  2004    with han en y    HERNIA REPAIR      ventral inscisional    TONSILLECTOMY           Medications & Allergies:    Prior to Admission medications    Medication Sig Start Date End Date Taking? Authorizing Provider   Accu-Chek FastClix Lancets MISC Test blood sugar twice daily 11/16/21   Sridevi Allen MD   albuterol (PROVENTIL HFA,VENTOLIN HFA) 90 mcg/act inhaler Inhale 2 puffs every 4 (four) hours as needed for wheezing or shortness of breath 10/12/23   RODRIGUE Moore   apixaban (Eliquis) 5 mg Take 1 tablet (5 mg total) by mouth 2 (two) times a day 11/21/22   Butch Hart MD   atorvastatin (LIPITOR) 10 mg tablet TAKE 1 TABLET BY MOUTH EVERY DAY 1/27/23   Butch Hart MD   Blood Glucose Monitoring Suppl (Accu-Chek Guide) w/Device KIT Use 2 (two) times a day Test blood sugar twice daily 8/5/21   Sridevi Allen MD   budesonide-formoterol (Symbicort) 160-4.5 mcg/act inhaler Inhale 2 puffs 2 (two) times a day Rinse mouth after use.  11/29/22   Sridevi Allen MD   bumetanide (BUMEX) 1 mg tablet Take 6 tablets (6 mg total) by mouth 2 (two) times a day Do not start before October 13, 2023. 10/13/23 11/12/23  RODRIGUE Rodriges   Empagliflozin (JARDIANCE) 10 MG TABS tablet Take 1 tablet (10 mg total) by mouth daily 5/26/23   Butch Hart MD   EPINEPHrine (EPIPEN) 0.3 mg/0.3 mL SOAJ Inject 0.3 mL (0.3 mg total) into a muscle once for 1 dose 2/21/23 4/10/23  Vinicio Toribio MD   Fasenra Pen 30 MG/ML SOAJ  3/24/23   Historical Provider, MD   fluticasone (FLONASE) 50 mcg/act nasal spray 1 spray into each nostril 2 (two) times a day 10/12/23   RODRIGUE Munguia   loratadine (CLARITIN) 10 mg tablet Take 1 tablet (10 mg total) by mouth daily Do not start before 2023. 10/13/23   RODRIGUE Munguia   metolazone (ZAROXOLYN) 2.5 mg tablet Take 20-30 minutes before Bumex dose and with additional potassium. Take for weight gain of 2-3 lbs in 24 hours, 5lbs in one week or signs of worsening fluid retention. 10/12/23   RODRIGUE Rodriges   metoprolol succinate (TOPROL-XL) 50 mg 24 hr tablet Take 1 tablet (50 mg total) by mouth daily 22   Tomi Herndon MD   montelukast (SINGULAIR) 10 mg tablet Take 1 tablet (10 mg total) by mouth daily at bedtime 2/3/22 10/5/23  Bibi Patricia MD   pantoprazole (PROTONIX) 40 mg tablet Take 1 tablet (40 mg total) by mouth daily 3/21/23   RODRIGUE Jimenez   potassium chloride (K-DUR,KLOR-CON) 20 mEq tablet Take 2 tablets (40 mEq total) by mouth 2 (two) times a day 7/28/23 10/5/23  Stefani Galvan MD   pregabalin (LYRICA) 50 mg capsule Take 1 caps. at bedtime 23   Bibi Patricia MD   sitaGLIPtin (Januvia) 100 mg tablet TAKE 1/2 TABLET DAILY 23   Bibi Patricia MD   Spiriva Respimat 1.25 MCG/ACT AERS inhaler INHALE 2 PUFFS BY MOUTH EVERY DAY 23   Bibi Patricia MD   spironolactone (ALDACTONE) 25 mg tablet Take 1 tablet (25 mg total) by mouth daily 10/5/23   Tomi Herndon MD         Allergies:    Allergies   Allergen Reactions    Azithromycin Other (See Comments)     Shaky, uneasy feeling     Bactrim [Sulfamethoxazole-Trimethoprim] Other (See Comments)     shakiness         Social History:    Substance Use History:   Social History     Substance and Sexual Activity   Alcohol Use Yes    Alcohol/week: 2.0 standard drinks of alcohol    Types: 2 Shots of liquor per week    Comment: social     Social History     Tobacco Use   Smoking Status Former    Types: Pipe, Cigars    Start date:     Quit date: 2021    Years since quittin.7   Smokeless Tobacco Never   Tobacco Comments    cigar once a day     Social History     Substance and Sexual Activity   Drug Use No         Family History:    Per medical chart, significant for stroke in mother, and for COPD, hypertension, and an unspecified cancer in father.       Physical Exam:     Vitals:   Blood Pressure: 165/71 (10/16/23 1243)  Pulse: 97 (10/16/23 1243)  Temperature: 98.2 °F (36.8 °C) (10/16/23 0746)  Temp Source: Oral (10/16/23 0746)  Respirations: 22 (10/16/23 1243)  SpO2: 95 % (10/16/23 1243)      GENERAL Obese and mildly weak/fatigued   HEAD   Normocephalic - atraumatic   EYES Nonicteric   MOUTH   Mucosa moist   NECK   Supple - full range of motion   CARDIAC Rate controlled - S1/S2 positive   PULMONARY    Diminished bilateral breath sounds with coarse wheezing - nonlabored respirations at rest on 2 L via nasal cannula   ABDOMEN   Soft - nontender/nondistended - active bowel sounds   MUSCULOSKELETAL   Motor strength/range of motion fairly intact   NEUROLOGIC   Alert/oriented at baseline   SKIN   Chronic wrinkles/blemishes    PSYCHIATRIC   Mood/affect stable         Additional Data:     Labs & Recent Cultures:    Results from last 7 days   Lab Units 10/16/23  0807   WBC Thousand/uL 9.75   HEMOGLOBIN g/dL 11.2*   HEMATOCRIT % 35.3*   PLATELETS Thousands/uL 309   NEUTROS PCT % 54   LYMPHS PCT % 13*   MONOS PCT % 8   EOS PCT % 22*     Results from last 7 days   Lab Units 10/16/23  0807   SODIUM mmol/L 137   POTASSIUM mmol/L 3.7   CHLORIDE mmol/L 98   CO2 mmol/L 28   BUN mg/dL 23   CREATININE mg/dL 1.37*   ANION GAP mmol/L 11   CALCIUM mg/dL 8.9   ALBUMIN g/dL 3.6   TOTAL BILIRUBIN mg/dL 0.70   ALK PHOS U/L 94   ALT U/L 83*   AST U/L 40*   GLUCOSE RANDOM mg/dL 252*     Results from last 7 days   Lab Units 10/12/23  0501   INR  1.29*     Results from last 7 days   Lab Units 10/12/23  1201 10/12/23  0545 10/11/23  2023 10/11/23  1704 10/11/23  1149 10/11/23  0614 10/10/23  2049 10/10/23  1700 10/10/23  1206 10/10/23  0610 10/09/23  2038 10/09/23  1709   POC GLUCOSE mg/dl 231* 177* 240* 144* 253* 179* 266* 212* 238* 176* 224* 234*                         Lines/Drains:  Invasive Devices       Peripheral Intravenous Line  Duration             Peripheral IV 10/16/23 Right Antecubital <1 day                      Imaging:     CT chest abdomen pelvis w contrast    Result Date: 10/16/2023  Narrative: CT CHEST, ABDOMEN AND PELVIS WITH IV CONTRAST INDICATION:   sob, generalized abdominal pain. COMPARISON: CT chest February 12, 2022. No prior CT abdomen pelvis available for comparison. TECHNIQUE: CT examination of the chest, abdomen and pelvis was performed. Multiplanar 2D reformatted images were created from the source data. This examination, like all CT scans performed in the [facility], was performed utilizing techniques to minimize radiation dose exposure, including the use of iterative reconstruction and automated exposure control. Radiation dose length product (DLP) for this visit:  1738.82 mGy-cm IV Contrast:  100 mL of iohexol (OMNIPAQUE) Enteric Contrast: Enteric contrast was not administered. FINDINGS: CHEST LUNGS: Solid 0.6 x 0.5 cm left lower lobe nodule is unchanged (series 3, image 200). No focal consolidation. No interlobular septal thickening. PLEURA:  Unremarkable. HEART/GREAT VESSELS: Heart is unremarkable for patient's age. No thoracic aortic aneurysm. CardioMEMS device within a branch of the pulmonary artery in the left lower lobe (series 6, image 83). MEDIASTINUM AND KHANG:  Unremarkable. CHEST WALL AND LOWER NECK:  Unremarkable. ABDOMEN LIVER/BILIARY TREE:  Unremarkable. GALLBLADDER:  No calcified gallstones. No pericholecystic inflammatory change. SPLEEN:  Unremarkable. PANCREAS:  Unremarkable. ADRENAL GLANDS:  Unremarkable. KIDNEYS/URETERS:  Unremarkable. No hydronephrosis. STOMACH AND BOWEL: Post Samuel-en-Y gastric bypass. Colonic diverticulosis without findings of acute diverticulitis.  No abnormal luminal distention or mural thickening of the small bowel or colon. APPENDIX: A normal appendix was visualized. ABDOMINOPELVIC CAVITY:  No ascites. No pneumoperitoneum. No lymphadenopathy. VESSELS:  Unremarkable for patient's age. PELVIS REPRODUCTIVE ORGANS:  Unremarkable for patient's age. URINARY BLADDER:  Unremarkable. ABDOMINAL WALL/INGUINAL REGIONS:  Unremarkable. OSSEOUS STRUCTURES:  No acute fracture or destructive osseous lesion. Impression: No acute thoracic or abdominopelvic pathology Workstation performed: [id number]               ** Please Note: This note is constructed using a voice recognition dictation system. An occasional wrong word/phrase or “sound-a-like” substitution may have been picked up by dictation device due to the inherent limitations of voice recognition software. Read the chart carefully and recognize, using reasonable context, where substitutions may have occurred. **

## 2023-10-16 NOTE — ED ATTENDING ATTESTATION
10/16/2023  I, Siva Cowart DO, saw and evaluated the patient. I have discussed the patient with the resident/non-physician practitioner and agree with the resident's/non-physician practitioner's findings, Plan of Care, and MDM as documented in the resident's/non-physician practitioner's note, except where noted. All available labs and Radiology studies were reviewed. I was present for key portions of any procedure(s) performed by the resident/non-physician practitioner and I was immediately available to provide assistance. At this point I agree with the current assessment done in the Emergency Department. I have conducted an independent evaluation of this patient a history and physical is as follows:    Chief Complaint   Patient presents with    Shortness of Breath     80-year-old male presents with shortness of breath. Patient was recently hospitalized for CHF exacerbation and was discharged on Thursday. Patient states shortness of breath started on Friday. Has been gradual in onset. Has been sleeping on the couch to keep himself propped up. Reports only approximately 1 pound weight gain. Has been taking his Bumex as directed and is urinating normally. No fevers. Does have a mild cough. On exam-no acute distress, heart regular, lungs wheezing bilaterally but no acute distress, abdomen obese. Plan-concern for CHF exacerbation versus COPD exacerbation, PE and pneumonia seem less likely at this time.   Check labs, x-ray, breathing treatment and reassess    ED Course         Critical Care Time  Procedures

## 2023-10-16 NOTE — ASSESSMENT & PLAN NOTE
Recent hospital admission w/ discharge earlier this month on 10/12/23 noted for CHF exacerbation  Last EF of 50% in April 2023 - s/p CardioMEMS placement in March 2023  [address] outpatient cardiology  Continue Bumex/Aldactone for diuresis and Toprol-XL for beta-blockade  Low-sodium/fluid restriction  Monitor/replete magnesium/potassium deficiencies if present

## 2023-10-16 NOTE — ASSESSMENT & PLAN NOTE
Continue Aldactone/Toprol-XL/Bumex  Additional PRN IV Hydralazine on board for BP spikes  Low-sodium diet

## 2023-10-16 NOTE — ASSESSMENT & PLAN NOTE
Baseline creatinine of approximately 1.3-1.5 -> presents stable at 1.37  Monitor renal function and urine output  Limit/avoid nephrotoxins as possible  Note chronic Bumex/Aldactone use

## 2023-10-16 NOTE — ED PROVIDER NOTES
History  Chief Complaint   Patient presents with    Shortness of Breath     Patient is a 65-year-old male with history of COPD, CHF, atrial fibrillation on Eliquis, hyperlipidemia, hypertension, diabetes, CKD who presents for shortness of breath. Patient was recently discharged from this hospital on October 12 for a CHF exacerbation requiring diuresis. Patient states that he felt well when he went home but the following day start developing shortness of breath that progressed over the next few days. He also endorsed onset of nonproductive cough. He states that today the symptoms became unbearable. He denies any fevers, chills, congestion, sore throat, chest pain, nausea, vomiting, diaphoresis. He states that he weighs himself daily and that he has gained about a pound since his recent discharge. He uses Bumex twice daily and uses an albuterol rescue inhaler which she has been using increasingly over the past couple of days. Prior to Admission Medications   Prescriptions Last Dose Informant Patient Reported? Taking?    Accu-Chek FastClix Lancets MISC Past Week Self No Yes   Sig: Test blood sugar twice daily   Blood Glucose Monitoring Suppl (Accu-Chek Guide) w/Device KIT Past Week Self No Yes   Sig: Use 2 (two) times a day Test blood sugar twice daily   EPINEPHrine (EPIPEN) 0.3 mg/0.3 mL SOAJ  Self No No   Sig: Inject 0.3 mL (0.3 mg total) into a muscle once for 1 dose   Empagliflozin (JARDIANCE) 10 MG TABS tablet 10/16/2023 Self No Yes   Sig: Take 1 tablet (10 mg total) by mouth daily   Fasenra Pen 30 MG/ML SOAJ Past Month Self Yes Yes   Spiriva Respimat 1.25 MCG/ACT AERS inhaler 10/16/2023 Self No Yes   Sig: INHALE 2 PUFFS BY MOUTH EVERY DAY   albuterol (PROVENTIL HFA,VENTOLIN HFA) 90 mcg/act inhaler 10/16/2023  No Yes   Sig: Inhale 2 puffs every 4 (four) hours as needed for wheezing or shortness of breath   apixaban (Eliquis) 5 mg 10/15/2023 Self No Yes   Sig: Take 1 tablet (5 mg total) by mouth 2 (two) times a day   atorvastatin (LIPITOR) 10 mg tablet 10/15/2023 Self No Yes   Sig: TAKE 1 TABLET BY MOUTH EVERY DAY   budesonide-formoterol (Symbicort) 160-4.5 mcg/act inhaler 10/16/2023 Self No Yes   Sig: Inhale 2 puffs 2 (two) times a day Rinse mouth after use. bumetanide (BUMEX) 1 mg tablet 10/15/2023  No Yes   Sig: Take 6 tablets (6 mg total) by mouth 2 (two) times a day Do not start before 2023. fluticasone (FLONASE) 50 mcg/act nasal spray 10/15/2023  No Yes   Si spray into each nostril 2 (two) times a day   loratadine (CLARITIN) 10 mg tablet 10/15/2023  No Yes   Sig: Take 1 tablet (10 mg total) by mouth daily Do not start before 2023. metolazone (ZAROXOLYN) 2.5 mg tablet Past Week  No Yes   Sig: Take 20-30 minutes before Bumex dose and with additional potassium. Take for weight gain of 2-3 lbs in 24 hours, 5lbs in one week or signs of worsening fluid retention. metoprolol succinate (TOPROL-XL) 50 mg 24 hr tablet 10/15/2023 Self No Yes   Sig: Take 1 tablet (50 mg total) by mouth daily   montelukast (SINGULAIR) 10 mg tablet  Self No No   Sig: Take 1 tablet (10 mg total) by mouth daily at bedtime   pantoprazole (PROTONIX) 40 mg tablet Past Week Self No Yes   Sig: Take 1 tablet (40 mg total) by mouth daily   potassium chloride (K-DUR,KLOR-CON) 20 mEq tablet  Self No No   Sig: Take 2 tablets (40 mEq total) by mouth 2 (two) times a day   pregabalin (LYRICA) 50 mg capsule 10/15/2023 Self No Yes   Sig: Take 1 caps.  at bedtime   sitaGLIPtin (Januvia) 100 mg tablet 10/15/2023 Self No Yes   Sig: TAKE 1/2 TABLET DAILY   spironolactone (ALDACTONE) 25 mg tablet 10/15/2023  No Yes   Sig: Take 1 tablet (25 mg total) by mouth daily      Facility-Administered Medications: None       Past Medical History:   Diagnosis Date    Acute gastritis without hemorrhage     last assessed 3/24/17    Asthma     Atrial fibrillation (HCC)     Bilateral leg edema 2020    CHF (congestive heart failure) ([address])     Coronary artery disease     Daytime sleepiness 2019    Diabetes mellitus ([address])     Dyspnea on exertion 10/11/2021    Edema of both feet 2020    Gastric bypass status for obesity     Hyperlipidemia     Hypertension     Hypokalemia 2021    Impaired fasting glucose     last assessed 5/10/17    Knee sprain, bilateral 2018    Leg cramps 2022    Obesity     Viral gastroenteritis     last assessed 16       Past Surgical History:   Procedure Laterality Date    CARDIAC CATHETERIZATION N/A 3/9/2022    Procedure: CARDIAC RHC W/ PRESSURE SENSOR;  Surgeon: Lor Aly MD;  [facility]: [facility]; Service: Cardiology    CARDIAC CATHETERIZATION N/A 2022    Procedure: Cardiac catheterization;  Surgeon: Domenico Barrett DO;  [facility]; Service: Cardiology    CARDIAC CATHETERIZATION N/A 2022    Procedure: Cardiac Coronary Angiogram;  Surgeon: Domenico Barrett DO;  [facility]; Service: Cardiology    CARDIAC CATHETERIZATION N/A 10/11/2023    Procedure: Cardiac RHC; Surgeon: Miladis Abbott MD;  [facility]; Service: Cardiology    CARPAL TUNNEL RELEASE      bilateral    GASTRIC BYPASS  2004    with han en y    HERNIA REPAIR      ventral inscisional    TONSILLECTOMY         Family History   Problem Relation Age of Onset    Stroke Mother     Hypertension Father     Cancer Father     COPD Father      I have reviewed and agree with the history as documented.     E-Cigarette/Vaping    E-Cigarette Use Never User      E-Cigarette/Vaping Substances    Nicotine No     THC No     CBD No     Flavoring No     Other No     Unknown No      Social History     Tobacco Use    Smoking status: Former     Types: Pipe, Cigars     Start date:      Quit date: 2021     Years since quittin.7    Smokeless tobacco: Never    Tobacco comments:     cigar once a day   Vaping Use    Vaping Use: Never used   Substance Use Topics Alcohol use: Yes     Alcohol/week: 2.0 standard drinks of alcohol     Types: 2 Shots of liquor per week     Comment: social    Drug use: No        Review of Systems   Constitutional:  Negative for chills and fever. HENT:  Negative for congestion, rhinorrhea and sore throat. Eyes:  Negative for pain and redness. Respiratory:  Positive for cough, shortness of breath and wheezing. Cardiovascular:  Negative for chest pain, palpitations and leg swelling. Gastrointestinal:  Negative for abdominal pain, constipation, diarrhea, nausea and vomiting. Genitourinary:  Negative for dysuria and hematuria. Musculoskeletal:  Negative for back pain and neck pain. Skin:  Negative for pallor and rash. Neurological:  Negative for weakness and numbness. All other systems reviewed and are negative. Physical Exam  ED Triage Vitals   Temperature Pulse Respirations Blood Pressure SpO2   10/16/23 0746 10/16/23 0745 10/16/23 0745 10/16/23 0745 10/16/23 0745   98.2 °F (36.8 °C) 90 22 148/74 97 %      Temp Source Heart Rate Source Patient Position - Orthostatic VS BP Location FiO2 (%)   10/16/23 0746 10/16/23 0745 10/16/23 0745 10/16/23 0745 --   Oral Monitor Lying Right arm       Pain Score       10/16/23 0745       No Pain             Orthostatic Vital Signs  Vitals:    10/16/23 1030 10/16/23 1130 10/16/23 1243 10/16/23 1623   BP: 163/69 159/70 165/71 142/79   Pulse: 90 86 97 101   Patient Position - Orthostatic VS: Lying Lying Sitting        Physical Exam  Vitals and nursing note reviewed. Constitutional:       General: He is not in acute distress. Appearance: Normal appearance. He is well-developed and normal weight. He is not ill-appearing, toxic-appearing or diaphoretic. HENT:      Head: Normocephalic and atraumatic. Right Ear: External ear normal.      Left Ear: External ear normal.      Nose: Nose normal. No congestion or rhinorrhea.       Mouth/Throat:      Mouth: Mucous membranes are moist. Pharynx: Oropharynx is clear. No oropharyngeal exudate or posterior oropharyngeal erythema. Eyes:      General: No scleral icterus. Right eye: No discharge. Left eye: No discharge. Extraocular Movements: Extraocular movements intact. Conjunctiva/sclera: Conjunctivae normal.      Pupils: Pupils are equal, round, and reactive to light. Cardiovascular:      Rate and Rhythm: Normal rate and regular rhythm. Pulses: Normal pulses. Heart sounds: Normal heart sounds. No murmur heard. No friction rub. No gallop. Pulmonary:      Effort: Pulmonary effort is normal. Tachypnea present. No respiratory distress. Breath sounds: No stridor. Wheezing (Bilateral, predominantly in the bases) present. No decreased breath sounds, rhonchi or rales. Chest:      Chest wall: No tenderness. Abdominal:      General: Abdomen is flat. Bowel sounds are normal. There is no distension. Palpations: Abdomen is soft. Tenderness: There is abdominal tenderness (Mild, generalized). There is no right CVA tenderness, left CVA tenderness or guarding. Musculoskeletal:         General: No tenderness. Cervical back: Normal range of motion and neck supple. No rigidity or tenderness. Right lower leg: No edema. Left lower leg: No edema. Skin:     General: Skin is warm and dry. Capillary Refill: Capillary refill takes less than 2 seconds. Coloration: Skin is not jaundiced or pale. Neurological:      General: No focal deficit present. Mental Status: He is alert and oriented to person, place, and time. Cranial Nerves: No cranial nerve deficit. Sensory: No sensory deficit. Motor: No weakness.    Psychiatric:         Mood and Affect: Mood normal.         Behavior: Behavior normal.         ED Medications  Medications   acetaminophen (TYLENOL) tablet 650 mg (has no administration in time range)   ondansetron (ZOFRAN) injection 4 mg (has no administration in time range)   apixaban (ELIQUIS) tablet 5 mg (has no administration in time range)   atorvastatin (LIPITOR) tablet 10 mg (has no administration in time range)   bumetanide (BUMEX) tablet 6 mg (has no administration in time range)   Empagliflozin (JARDIANCE) tablet 10 mg (10 mg Oral Given 10/16/23 1240)   fluticasone (FLONASE) 50 mcg/act nasal spray 1 spray (has no administration in time range)   loratadine (CLARITIN) tablet 10 mg (has no administration in time range)   metoprolol succinate (TOPROL-XL) 24 hr tablet 50 mg (has no administration in time range)   pantoprazole (PROTONIX) EC tablet 40 mg (has no administration in time range)   pregabalin (LYRICA) capsule 50 mg (has no administration in time range)   umeclidinium 62.5 mcg/actuation inhaler AEPB 1 puff (has no administration in time range)   spironolactone (ALDACTONE) tablet 25 mg (has no administration in time range)   levalbuterol (XOPENEX) inhalation solution 1.25 mg (1.25 mg Nebulization Given 10/16/23 1431)   methylPREDNISolone sodium succinate (Solu-MEDROL) injection 40 mg (has no administration in time range)   levalbuterol (XOPENEX) inhalation solution 1.25 mg (has no administration in time range)   hydrALAZINE (APRESOLINE) injection 5 mg (has no administration in time range)   ipratropium-albuterol (FOR EMS ONLY) (DUO-NEB) 0.5-2.5 mg/3 mL inhalation solution 3 mL (0 mL Does not apply Given to EMS 10/16/23 0750)   albuterol inhalation solution 10 mg (10 mg Nebulization Given 10/16/23 0820)     And   ipratropium (ATROVENT) 0.02 % inhalation solution 1 mg (1 mg Nebulization Given 10/16/23 0820)     And   sodium chloride 0.9 % inhalation solution 3 mL (3 mL Nebulization Given 10/16/23 0820)   methylPREDNISolone sodium succinate (Solu-MEDROL) injection 125 mg (125 mg Intravenous Given 10/16/23 1024)   iohexol (OMNIPAQUE) 350 MG/ML injection (MULTI-DOSE) 100 mL (100 mL Intravenous Given 10/16/23 0943)   magnesium sulfate 2 g/50 mL IVPB (premix) 2 g (0 g Intravenous Stopped 10/16/23 1609)       Diagnostic Studies  Results Reviewed       Procedure Component Value Units Date/Time    Sputum culture and Gram stain [[id number]]     Lab Status: No result Specimen: Sputum     HS Troponin I 2hr [[id number]]  (Normal) Collected: 10/16/23 1024    Lab Status: Final result Specimen: Blood from Arm, Right Updated: 10/16/23 1113     hs TnI 2hr 14 ng/L      Delta 2hr hsTnI -3 ng/L     HS Troponin 0hr (reflex protocol) [[id number]]  (Normal) Collected: 10/16/23 0807    Lab Status: Final result Specimen: Blood from Arm, Right Updated: 10/16/23 0843     hs TnI 0hr 17 ng/L     Comprehensive metabolic panel [[id number]]  (Abnormal) Collected: 10/16/23 0807    Lab Status: Final result Specimen: Blood from Arm, Right Updated: 10/16/23 0842     Sodium 137 mmol/L      Potassium 3.7 mmol/L      Chloride 98 mmol/L      CO2 28 mmol/L      ANION GAP 11 mmol/L      BUN 23 mg/dL      Creatinine 1.37 mg/dL      Glucose 252 mg/dL      Calcium 8.9 mg/dL      AST 40 U/L      ALT 83 U/L      Alkaline Phosphatase 94 U/L      Total Protein 7.1 g/dL      Albumin 3.6 g/dL      Total Bilirubin 0.70 mg/dL      eGFR 57 ml/min/1.73sq m     Narrative:      [facility] (CKD):     Stage 1 with normal or high GFR (GFR > 90 mL/min/1.73 square meters)    Stage 2 Mild CKD (GFR = 60-89 mL/min/1.73 square meters)    Stage 3A Moderate CKD (GFR = 45-59 mL/min/1.73 square meters)    Stage 3B Moderate CKD (GFR = 30-44 mL/min/1.73 square meters)    Stage 4 Severe CKD (GFR = 15-29 mL/min/1.73 square meters)    Stage 5 End Stage CKD (GFR <15 mL/min/1.73 square meters)  Note: GFR calculation is accurate only with a steady state creatinine    Lipase [[id number]]  (Normal) Collected: 10/16/23 0807    Lab Status: Final result Specimen: Blood from Arm, Right Updated: 10/16/23 0842     Lipase 66 u/L     CBC and differential [[id number]]  (Abnormal) Collected: 10/16/23 9493 Lab Status: Final result Specimen: Blood from Arm, Right Updated: 10/16/23 0819     WBC 9.75 Thousand/uL      RBC 4.31 Million/uL      Hemoglobin 11.2 g/dL      Hematocrit 35.3 %      MCV 82 fL      MCH 26.0 pg      MCHC 31.7 g/dL      RDW 16.7 %      MPV 10.8 fL      Platelets 781 Thousands/uL      nRBC 0 /100 WBCs      Neutrophils Relative 54 %      Immat GRANS % 1 %      Lymphocytes Relative 13 %      Monocytes Relative 8 %      Eosinophils Relative 22 %      Basophils Relative 2 %      Neutrophils Absolute 5.46 Thousands/µL      Immature Grans Absolute 0.08 Thousand/uL      Lymphocytes Absolute 1.23 Thousands/µL      Monocytes Absolute 0.73 Thousand/µL      Eosinophils Absolute 2.10 Thousand/µL      Basophils Absolute 0.15 Thousands/µL                    CT chest abdomen pelvis w contrast   Final Result by Carlos Guerrero MD (10/16 1054)      No acute thoracic or abdominopelvic pathology            Workstation performed: [id number]               Procedures  Procedures      ED Course  ED Course as of 10/16/23 1642   Mon Oct 16, 2023   0913 hs TnI 0hr: 17   1021 This EKG was interpreted by me. The EKG demonstrates Normal sinus rhythm, normal intervals and axis, normal QRS, no acute ST changes present. Medical Decision Making  Patient is a 68-year-old male with history of CHF, COPD, hyperlipidemia, hypertension, A-fib on Eliquis, diabetes, and CKD who presents with shortness of breath. DDx includes not limited to COPD exacerbation, CHF exacerbation, ACS, pneumothorax, pneumonia. Will obtain cardiac work-up, CT chest abdomen pelvis. Will initiate heart neb and give patient steroids for empiric COPD exacerbation given diffuse wheezing on exam.    Patient's labs with elevated troponin but negative delta. His CT imaging shows no acute abnormalities. Likely COPD exacerbation.   I discussed case with medicine attending who agrees to admit the patient for further management of COPD exacerbation. Amount and/or Complexity of Data Reviewed  Labs: ordered. Decision-making details documented in ED Course. Radiology: ordered. Risk  Prescription drug management. Decision regarding hospitalization. Disposition  Final diagnoses:   COPD with acute exacerbation ([address])   CHF (congestive heart failure) ([address])   DM (diabetes mellitus) ([address])   A-fib ([address])   HLD (hyperlipidemia)   HTN (hypertension)     Time reflects when diagnosis was documented in both MDM as applicable and the Disposition within this note       Time User Action Codes Description Comment    10/16/2023 11:23 AM Danella Adolfo Add [J44.1] COPD with acute exacerbation ([address])     10/16/2023 11:23 AM Danella Adolfo Add [I50.9] CHF (congestive heart failure) ([address])     10/16/2023 11:23 AM Danella Adolfo Add [E11.9] DM (diabetes mellitus) ([address])     10/16/2023 11:23 AM Danella Adolfo Add [I48.91] A-fib ([address])     10/16/2023 11:23 AM Danella Adolfo Add [E78.5] HLD (hyperlipidemia)     10/16/2023 11:23 AM Danella Adolfo Add [I10] HTN (hypertension)           ED Disposition       ED Disposition   Admit    Condition   Stable    Date/Time   Mon Oct 16, 2023 11:23 AM    Comment   Case was discussed with Dr. Ramses Carvajal and the patient's admission status was agreed to be Admission Status: inpatient status to the service of Dr. Ramses Carvajal .                Follow-up Information    None         Current Discharge Medication List        CONTINUE these medications which have NOT CHANGED    Details   Accu-Chek FastClix Lancets MISC Test blood sugar twice daily  Qty: 200 each, Refills: 3    Associated Diagnoses: Type 2 diabetes mellitus with hyperglycemia, without long-term current use of insulin (HCC)      albuterol (PROVENTIL HFA,VENTOLIN HFA) 90 mcg/act inhaler Inhale 2 puffs every 4 (four) hours as needed for wheezing or shortness of breath  Qty: 18 g, Refills: 0    Comments: Substitution to a formulary equivalent within the same pharmaceutical class is authorized. Associated Diagnoses: Mild intermittent asthma without complication      apixaban (Eliquis) 5 mg Take 1 tablet (5 mg total) by mouth 2 (two) times a day  Qty: 60 tablet, Refills: 5    Associated Diagnoses: A-fib ([address]); Atrial fibrillation, unspecified type ([facility])      atorvastatin (LIPITOR) 10 mg tablet TAKE 1 TABLET BY MOUTH EVERY DAY  Qty: 90 tablet, Refills: 2    Associated Diagnoses: Mixed hyperlipidemia      Blood Glucose Monitoring Suppl (Accu-Chek Guide) w/Device KIT Use 2 (two) times a day Test blood sugar twice daily  Qty: 1 kit, Refills: 0    Associated Diagnoses: Type 2 diabetes mellitus with hyperglycemia, without long-term current use of insulin ([facility])      budesonide-formoterol (Symbicort) 160-4.5 mcg/act inhaler Inhale 2 puffs 2 (two) times a day Rinse mouth after use. Qty: 30.6 g, Refills: 2    Associated Diagnoses: Moderate persistent asthma with acute exacerbation      bumetanide (BUMEX) 1 mg tablet Take 6 tablets (6 mg total) by mouth 2 (two) times a day Do not start before October 13, 2023. Qty: 300 tablet, Refills: 0    Associated Diagnoses: Acute on chronic diastolic heart failure ([facility])      Empagliflozin (JARDIANCE) 10 MG TABS tablet Take 1 tablet (10 mg total) by mouth daily  Qty: 30 tablet, Refills: 11    Associated Diagnoses: Acute on chronic right-sided heart failure ([address]); Type 2 diabetes mellitus with stage 3b chronic kidney disease, without long-term current use of insulin ([facility])      Fasenra Pen 30 MG/ML SOAJ       fluticasone (FLONASE) 50 mcg/act nasal spray 1 spray into each nostril 2 (two) times a day  Refills: 0    Associated Diagnoses: Nasal congestion      loratadine (CLARITIN) 10 mg tablet Take 1 tablet (10 mg total) by mouth daily Do not start before October 13, 2023. Refills: 0    Associated Diagnoses: Nasal congestion      metolazone (ZAROXOLYN) 2.5 mg tablet Take 20-30 minutes before Bumex dose and with additional potassium. Take for weight gain of 2-3 lbs in 24 hours, 5lbs in one week or signs of worsening fluid retention. Qty: 90 tablet, Refills: 0    Associated Diagnoses: Acute on chronic heart failure with preserved ejection fraction ([facility])      metoprolol succinate (TOPROL-XL) 50 mg 24 hr tablet Take 1 tablet (50 mg total) by mouth daily  Qty: 90 tablet, Refills: 3    Associated Diagnoses: Chronic heart failure with preserved ejection fraction ([facility])      pantoprazole (PROTONIX) 40 mg tablet Take 1 tablet (40 mg total) by mouth daily  Qty: 21 tablet, Refills: 0    Associated Diagnoses: Severe persistent asthma with acute exacerbation      pregabalin (LYRICA) 50 mg capsule Take 1 caps. at bedtime  Qty: 30 capsule, Refills: 5    Associated Diagnoses: Diabetic polyneuropathy associated with type 2 diabetes mellitus ([facility])      sitaGLIPtin (Januvia) 100 mg tablet TAKE 1/2 TABLET DAILY  Qty: 15 tablet, Refills: 5    Associated Diagnoses: Type 2 diabetes mellitus with stage 3b chronic kidney disease, without long-term current use of insulin ([facility])      Spiriva Respimat 1.25 MCG/ACT AERS inhaler INHALE 2 PUFFS BY MOUTH EVERY DAY  Qty: 4 g, Refills: 5    Associated Diagnoses: Moderate persistent asthma with acute exacerbation      spironolactone (ALDACTONE) 25 mg tablet Take 1 tablet (25 mg total) by mouth daily  Qty: 30 tablet, Refills: 5    Associated Diagnoses: Chronic heart failure with preserved ejection fraction (HFpEF) ([facility])      EPINEPHrine (EPIPEN) 0.3 mg/0.3 mL SOAJ Inject 0.3 mL (0.3 mg total) into a muscle once for 1 dose  Qty: 0.6 mL, Refills: 0    Associated Diagnoses: Anaphylaxis, initial encounter      montelukast (SINGULAIR) 10 mg tablet Take 1 tablet (10 mg total) by mouth daily at bedtime  Qty: 90 tablet, Refills: 1    Associated Diagnoses:  Moderate persistent asthma with acute exacerbation      potassium chloride (K-DUR,KLOR-CON) 20 mEq tablet Take 2 tablets (40 mEq total) by mouth 2 (two) times a day  Qty: 120 tablet, Refills: 0 Associated Diagnoses: Diuretic-induced hypokalemia           No discharge procedures on file. PDMP Review         Value Time User    PDMP Reviewed  Yes 10/6/2023 10:22 PM Mariel Siemens, DO             ED Provider  Attending physically available and evaluated Amita Galvan. I managed the patient along with the ED Attending.     Electronically Signed by           Sharonda Morris MD  10/16/23 5613

## 2023-10-16 NOTE — ASSESSMENT & PLAN NOTE
Lab Results   Component Value Date    HGBA1C 7.1 (A) 08/09/2023     Kennethuvia held while hospitalized - continued Jardiance in the setting of known heart failure  Initiated SSI coverage per Accu-Cheks  Carbohydrate restriction  Hypoglycemia protocol

## 2023-10-17 PROBLEM — E87.1 HYPONATREMIA: Status: ACTIVE | Noted: 2023-10-17

## 2023-10-17 LAB
ANION GAP SERPL CALCULATED.3IONS-SCNC: 12 MMOL/L
BASOPHILS # BLD AUTO: 0.01 THOUSANDS/ÂΜL (ref 0–0.1)
BASOPHILS NFR BLD AUTO: 0 % (ref 0–1)
BNP SERPL-MCNC: 138 PG/ML (ref 0–100)
BUN SERPL-MCNC: 37 MG/DL (ref 5–25)
CALCIUM SERPL-MCNC: 8.8 MG/DL (ref 8.4–10.2)
CHLORIDE SERPL-SCNC: 94 MMOL/L (ref 96–108)
CO2 SERPL-SCNC: 26 MMOL/L (ref 21–32)
CREAT SERPL-MCNC: 1.75 MG/DL (ref 0.6–1.3)
EOSINOPHIL # BLD AUTO: 0 THOUSAND/ÂΜL (ref 0–0.61)
EOSINOPHIL NFR BLD AUTO: 0 % (ref 0–6)
ERYTHROCYTE [DISTWIDTH] IN BLOOD BY AUTOMATED COUNT: 16.7 % (ref 11.6–15.1)
GFR SERPL CREATININE-BSD FRML MDRD: 42 ML/MIN/1.73SQ M
GLUCOSE SERPL-MCNC: 338 MG/DL (ref 65–140)
GLUCOSE SERPL-MCNC: 350 MG/DL (ref 65–140)
GLUCOSE SERPL-MCNC: 370 MG/DL (ref 65–140)
GLUCOSE SERPL-MCNC: 373 MG/DL (ref 65–140)
GLUCOSE SERPL-MCNC: 415 MG/DL (ref 65–140)
GLUCOSE SERPL-MCNC: 417 MG/DL (ref 65–140)
HCT VFR BLD AUTO: 32.2 % (ref 36.5–49.3)
HGB BLD-MCNC: 10.3 G/DL (ref 12–17)
IMM GRANULOCYTES # BLD AUTO: 0.1 THOUSAND/UL (ref 0–0.2)
IMM GRANULOCYTES NFR BLD AUTO: 1 % (ref 0–2)
LYMPHOCYTES # BLD AUTO: 0.5 THOUSANDS/ÂΜL (ref 0.6–4.47)
LYMPHOCYTES NFR BLD AUTO: 6 % (ref 14–44)
MAGNESIUM SERPL-MCNC: 2.7 MG/DL (ref 1.9–2.7)
MCH RBC QN AUTO: 25.8 PG (ref 26.8–34.3)
MCHC RBC AUTO-ENTMCNC: 32 G/DL (ref 31.4–37.4)
MCV RBC AUTO: 81 FL (ref 82–98)
MONOCYTES # BLD AUTO: 0.64 THOUSAND/ÂΜL (ref 0.17–1.22)
MONOCYTES NFR BLD AUTO: 7 % (ref 4–12)
NEUTROPHILS # BLD AUTO: 7.57 THOUSANDS/ÂΜL (ref 1.85–7.62)
NEUTS SEG NFR BLD AUTO: 86 % (ref 43–75)
NRBC BLD AUTO-RTO: 0 /100 WBCS
PLATELET # BLD AUTO: 270 THOUSANDS/UL (ref 149–390)
PMV BLD AUTO: 11 FL (ref 8.9–12.7)
POTASSIUM SERPL-SCNC: 3.9 MMOL/L (ref 3.5–5.3)
RBC # BLD AUTO: 3.99 MILLION/UL (ref 3.88–5.62)
SODIUM SERPL-SCNC: 132 MMOL/L (ref 135–147)
WBC # BLD AUTO: 8.82 THOUSAND/UL (ref 4.31–10.16)

## 2023-10-17 PROCEDURE — 94664 DEMO&/EVAL PT USE INHALER: CPT

## 2023-10-17 PROCEDURE — 80048 BASIC METABOLIC PNL TOTAL CA: CPT | Performed by: INTERNAL MEDICINE

## 2023-10-17 PROCEDURE — 83735 ASSAY OF MAGNESIUM: CPT | Performed by: INTERNAL MEDICINE

## 2023-10-17 PROCEDURE — NC001 PR NO CHARGE: Performed by: STUDENT IN AN ORGANIZED HEALTH CARE EDUCATION/TRAINING PROGRAM

## 2023-10-17 PROCEDURE — 83880 ASSAY OF NATRIURETIC PEPTIDE: CPT | Performed by: STUDENT IN AN ORGANIZED HEALTH CARE EDUCATION/TRAINING PROGRAM

## 2023-10-17 PROCEDURE — 82948 REAGENT STRIP/BLOOD GLUCOSE: CPT

## 2023-10-17 PROCEDURE — 94760 N-INVAS EAR/PLS OXIMETRY 1: CPT

## 2023-10-17 PROCEDURE — 99232 SBSQ HOSP IP/OBS MODERATE 35: CPT | Performed by: INTERNAL MEDICINE

## 2023-10-17 PROCEDURE — 85025 COMPLETE CBC W/AUTO DIFF WBC: CPT | Performed by: INTERNAL MEDICINE

## 2023-10-17 PROCEDURE — 99223 1ST HOSP IP/OBS HIGH 75: CPT | Performed by: STUDENT IN AN ORGANIZED HEALTH CARE EDUCATION/TRAINING PROGRAM

## 2023-10-17 PROCEDURE — 94640 AIRWAY INHALATION TREATMENT: CPT

## 2023-10-17 RX ORDER — LEVALBUTEROL INHALATION SOLUTION 1.25 MG/3ML
1.25 SOLUTION RESPIRATORY (INHALATION) 4 TIMES DAILY
Status: DISCONTINUED | OUTPATIENT
Start: 2023-10-17 | End: 2023-10-17

## 2023-10-17 RX ORDER — INSULIN GLARGINE 100 [IU]/ML
8 INJECTION, SOLUTION SUBCUTANEOUS EVERY MORNING
Status: DISCONTINUED | OUTPATIENT
Start: 2023-10-17 | End: 2023-10-18

## 2023-10-17 RX ORDER — INSULIN LISPRO 100 [IU]/ML
7 INJECTION, SOLUTION INTRAVENOUS; SUBCUTANEOUS
Status: DISCONTINUED | OUTPATIENT
Start: 2023-10-17 | End: 2023-10-18

## 2023-10-17 RX ORDER — LEVALBUTEROL INHALATION SOLUTION 1.25 MG/3ML
1.25 SOLUTION RESPIRATORY (INHALATION) 4 TIMES DAILY
Status: DISCONTINUED | OUTPATIENT
Start: 2023-10-17 | End: 2023-10-18

## 2023-10-17 RX ADMIN — INSULIN LISPRO 12 UNITS: 100 INJECTION, SOLUTION INTRAVENOUS; SUBCUTANEOUS at 21:13

## 2023-10-17 RX ADMIN — INSULIN LISPRO 10 UNITS: 100 INJECTION, SOLUTION INTRAVENOUS; SUBCUTANEOUS at 12:15

## 2023-10-17 RX ADMIN — BUMETANIDE 6 MG: 2 TABLET ORAL at 17:48

## 2023-10-17 RX ADMIN — LORATADINE 10 MG: 10 TABLET ORAL at 08:01

## 2023-10-17 RX ADMIN — METHYLPREDNISOLONE SODIUM SUCCINATE 40 MG: 40 INJECTION, POWDER, FOR SOLUTION INTRAMUSCULAR; INTRAVENOUS at 13:20

## 2023-10-17 RX ADMIN — IPRATROPIUM BROMIDE 0.5 MG: 0.5 SOLUTION RESPIRATORY (INHALATION) at 20:19

## 2023-10-17 RX ADMIN — EMPAGLIFLOZIN 10 MG: 10 TABLET, FILM COATED ORAL at 08:02

## 2023-10-17 RX ADMIN — FLUTICASONE PROPIONATE 1 SPRAY: 50 SPRAY, METERED NASAL at 17:48

## 2023-10-17 RX ADMIN — INSULIN LISPRO 7 UNITS: 100 INJECTION, SOLUTION INTRAVENOUS; SUBCUTANEOUS at 17:47

## 2023-10-17 RX ADMIN — BUMETANIDE 6 MG: 2 TABLET ORAL at 08:02

## 2023-10-17 RX ADMIN — METOPROLOL SUCCINATE 50 MG: 50 TABLET, EXTENDED RELEASE ORAL at 08:02

## 2023-10-17 RX ADMIN — INSULIN LISPRO 7 UNITS: 100 INJECTION, SOLUTION INTRAVENOUS; SUBCUTANEOUS at 12:16

## 2023-10-17 RX ADMIN — INSULIN LISPRO 8 UNITS: 100 INJECTION, SOLUTION INTRAVENOUS; SUBCUTANEOUS at 08:04

## 2023-10-17 RX ADMIN — APIXABAN 5 MG: 5 TABLET, FILM COATED ORAL at 17:46

## 2023-10-17 RX ADMIN — INSULIN GLARGINE 8 UNITS: 100 INJECTION, SOLUTION SUBCUTANEOUS at 12:16

## 2023-10-17 RX ADMIN — LEVALBUTEROL HYDROCHLORIDE 1.25 MG: 1.25 SOLUTION RESPIRATORY (INHALATION) at 20:22

## 2023-10-17 RX ADMIN — METHYLPREDNISOLONE SODIUM SUCCINATE 40 MG: 40 INJECTION, POWDER, FOR SOLUTION INTRAMUSCULAR; INTRAVENOUS at 21:12

## 2023-10-17 RX ADMIN — FLUTICASONE PROPIONATE 1 SPRAY: 50 SPRAY, METERED NASAL at 08:01

## 2023-10-17 RX ADMIN — METHYLPREDNISOLONE SODIUM SUCCINATE 40 MG: 40 INJECTION, POWDER, FOR SOLUTION INTRAMUSCULAR; INTRAVENOUS at 05:22

## 2023-10-17 RX ADMIN — ATORVASTATIN CALCIUM 10 MG: 10 TABLET, FILM COATED ORAL at 17:46

## 2023-10-17 RX ADMIN — UMECLIDINIUM 1 PUFF: 62.5 AEROSOL, POWDER ORAL at 08:01

## 2023-10-17 RX ADMIN — PANTOPRAZOLE SODIUM 40 MG: 40 TABLET, DELAYED RELEASE ORAL at 08:01

## 2023-10-17 RX ADMIN — LEVALBUTEROL HYDROCHLORIDE 1.25 MG: 1.25 SOLUTION RESPIRATORY (INHALATION) at 14:03

## 2023-10-17 RX ADMIN — SPIRONOLACTONE 25 MG: 25 TABLET ORAL at 08:01

## 2023-10-17 RX ADMIN — INSULIN LISPRO 12 UNITS: 100 INJECTION, SOLUTION INTRAVENOUS; SUBCUTANEOUS at 17:47

## 2023-10-17 RX ADMIN — APIXABAN 5 MG: 5 TABLET, FILM COATED ORAL at 08:02

## 2023-10-17 RX ADMIN — PREGABALIN 50 MG: 50 CAPSULE ORAL at 21:12

## 2023-10-17 NOTE — CONSULTS
PULMONOLOGY CONSULT NOTE     Name: Cuong Salazar   Age & Sex: 66 y.o. male   MRN: [id number]  Unit/Bed#: -01   Encounter: [id number]        Reason for consultation: persisted asthma    Requesting physician: Paulette Duenas    Assessment and Plan:    1. 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 Af on AC  3. VICKY not on Cpap  4. HFrEF s/p cardiomems implant in 2022, with recent exacerbation  5. Morbid obesity  6. CKD  7. DM2  8. Hx of gastric bypass  9. Hx of tobacco use    Still wheezing on examination. Will plan to keep MTP 40mg Q8H for today and taper tomorrow if possible. Continue with atrovent/xopenx QID. Should discharge with Symbicort and Spiriva as well as continue with Major Brendan and Singulair for his home dose. I suspect that he was triggered by some allergen at home due to eosinophilia. I will suggest to have deep cleaning of his house before returning. Add on BNP to evaluate his fluid status. Although I think he is euvolemic/mild overload on exam. Can continue with his diuretics as per last discharge regimen  Can continue with [facility] for his Af    History of Present Illness   HPI:  Cuong Salazar is a 66 y.o. male with PMHx morbid obesity, severe persistent eosinophilic asthma, VICKY, Af on AC, HFrEF s/p cardiomems, was recent discharged with heart failure exacerbation, presetned with shortness of breath for 4 days. Patient was recently admitted to the hospital with heart failure and fluid overload. His baseline weight was about 314lb, and he gained about 20 lbs which resulted in heart failure exacerbation. He was treated with diuretics, and followed by a repeat RHC with cardimem calibration showing PCWP 10-12 and PA 17. His goal of PAD is around 15-18. He was discharged with diuretics (bumex and PRN metolazone) which he stated he was compliant with. However, he started to feel more shortness of breath with excertion, cough with whitish clear sputum, and limited ambulating for the last 4 days.  He was using albuterol PRN more than 4 times a day with minimal improvement. He otherwise denies any recent BW changes, and he stated he is still using spiriva and Symbicort as directed. Last fasenra was injected about 3 weeks ago and is not due yet. Denies any sick contact, fever, but noted to have dogs at home which  has for a long time. Patient has history of severe persistent eosinophilic asthma (eos 2750 in 10/2023, IgE 435 in 12/2022), with previous frequent exacerbation. He was never being intubated because of asthma. Recently he started with Erle Rosado injection in 6/2023 and ever since then his asthma has been well controlled. His last exacerbation was in 3/2023 and Erle Rosado was started right after. He is currently on Symbicort 160-4.5 2puff BID, Spiriva BiD, Fasenra injection and albuterol PRN. He did not use albuterol as daily basis reports to be compliant with medication. He has previous allergy work up including allergy to cat and mite. Patient was also diagnosed with sleep apnea back in 2019. He was given CPAP and was compliant with it however his machine was recalled and never been replaced for at least 1 year. Patient felt his sleep apnea was actually getting better and less daytime sleepness now. At ER, patient initially required oxygen supplement. He was given MTP 125mg followed by 40mg and magnesium due to persisted wheezing. Labs are remakable of eosinophilia of 2100. CT chest are negative for PE and no pulmonary edema. We were consulted for asthma exacerbation. PFT 2021: severe obstructive airflow with FEV1 of 39%  Sleep study 2019: moderate VICKY and mild hypoxia, CNONIE 18.9      Review of systems:  12 point review of systems was completed and was otherwise negative except as listed in HPI.       Historical Information   Past Medical History:   Diagnosis Date    Acute gastritis without hemorrhage     last assessed 3/24/17    Asthma     Atrial fibrillation (HCC)     Bilateral leg edema 2020    CHF (congestive heart failure) (HCC)     Coronary artery disease     Daytime sleepiness 2019    Diabetes mellitus ([address])     Dyspnea on exertion 10/11/2021    Edema of both feet 2020    Gastric bypass status for obesity     Hyperlipidemia     Hypertension     Hypokalemia 2021    Impaired fasting glucose     last assessed 5/10/17    Knee sprain, bilateral 2018    Leg cramps 2022    Obesity     Viral gastroenteritis     last assessed 16     Past Surgical History:   Procedure Laterality Date    CARDIAC CATHETERIZATION N/A 3/9/2022    Procedure: CARDIAC RHC W/ PRESSURE SENSOR;  Surgeon: Shady Magallon MD;  Location: [facility]; Service: Cardiology    CARDIAC CATHETERIZATION N/A 2022    Procedure: Cardiac catheterization;  Surgeon: Austen Jackson DO;  [facility]; Service: Cardiology    CARDIAC CATHETERIZATION N/A 2022    Procedure: Cardiac Coronary Angiogram;  Surgeon: Austen Jackson DO;  [facility]; Service: Cardiology    CARDIAC CATHETERIZATION N/A 10/11/2023    Procedure: Cardiac RHC; Surgeon: Sherry Page MD;  Location: [facility];   Service: Cardiology    CARPAL TUNNEL RELEASE      bilateral    GASTRIC BYPASS  2004    with han en y    HERNIA REPAIR      ventral inscisional    TONSILLECTOMY       Family History   Problem Relation Age of Onset    Stroke Mother     Hypertension Father     Cancer Father     COPD Father        Occupational History: former EMT    Social History: smokes cigars occasionally  Social History     Tobacco Use   Smoking Status Former    Types: Pipe, Cigars    Start date:     Quit date: 2021    Years since quittin.7   Smokeless Tobacco Never   Tobacco Comments    cigar once a day         Meds/Allergies   Current Facility-Administered Medications   Medication Dose Route Frequency    acetaminophen (TYLENOL) tablet 650 mg  650 mg Oral Q6H PRN    apixaban (ELIQUIS) tablet 5 mg  5 mg Oral BID    atorvastatin (LIPITOR) tablet 10 mg  10 mg Oral Daily With Dinner    bumetanide (BUMEX) tablet 6 mg  6 mg Oral BID    Empagliflozin (JARDIANCE) tablet 10 mg  10 mg Oral Daily    fluticasone (FLONASE) 50 mcg/act nasal spray 1 spray  1 spray Nasal BID    hydrALAZINE (APRESOLINE) injection 5 mg  5 mg Intravenous Q6H PRN    insulin glargine (LANTUS) subcutaneous injection 8 Units 0.08 mL  8 Units Subcutaneous QAM    insulin lispro (HumaLOG) 100 units/mL subcutaneous injection 2-12 Units  2-12 Units Subcutaneous 4x Daily (AC & HS)    insulin lispro (HumaLOG) 100 units/mL subcutaneous injection 7 Units  7 Units Subcutaneous TID With Meals    ipratropium (ATROVENT) 0.02 % inhalation solution 0.5 mg  0.5 mg Nebulization 4x Daily    levalbuterol (XOPENEX) inhalation solution 1.25 mg  1.25 mg Nebulization Q6H PRN    levalbuterol (XOPENEX) inhalation solution 1.25 mg  1.25 mg Nebulization 4x Daily    loratadine (CLARITIN) tablet 10 mg  10 mg Oral Daily    methylPREDNISolone sodium succinate (Solu-MEDROL) injection 40 mg  40 mg Intravenous Q8H [facility]    metoprolol succinate (TOPROL-XL) 24 hr tablet 50 mg  50 mg Oral Daily    ondansetron (ZOFRAN) injection 4 mg  4 mg Intravenous Q4H PRN    pantoprazole (PROTONIX) EC tablet 40 mg  40 mg Oral Daily    pregabalin (LYRICA) capsule 50 mg  50 mg Oral HS    spironolactone (ALDACTONE) tablet 25 mg  25 mg Oral Daily     Medications Prior to Admission   Medication    Accu-Chek FastClix Lancets MISC    albuterol (PROVENTIL HFA,VENTOLIN HFA) 90 mcg/act inhaler    apixaban (Eliquis) 5 mg    atorvastatin (LIPITOR) 10 mg tablet    Blood Glucose Monitoring Suppl (Accu-Chek Guide) w/Device KIT    budesonide-formoterol (Symbicort) 160-4.5 mcg/act inhaler    bumetanide (BUMEX) 1 mg tablet    Empagliflozin (JARDIANCE) 10 MG TABS tablet    Fasenra Pen 30 MG/ML SOAJ    fluticasone (FLONASE) 50 mcg/act nasal spray    loratadine (CLARITIN) 10 mg tablet    metolazone (ZAROXOLYN) 2.5 mg tablet    metoprolol succinate (TOPROL-XL) 50 mg 24 hr tablet    pantoprazole (PROTONIX) 40 mg tablet    pregabalin (LYRICA) 50 mg capsule    sitaGLIPtin (Januvia) 100 mg tablet    Spiriva Respimat 1.25 MCG/ACT AERS inhaler    spironolactone (ALDACTONE) 25 mg tablet    EPINEPHrine (EPIPEN) 0.3 mg/0.3 mL SOAJ    montelukast (SINGULAIR) 10 mg tablet    potassium chloride (K-DUR,KLOR-CON) 20 mEq tablet     Allergies   Allergen Reactions    Azithromycin Other (See Comments)     Shaky, uneasy feeling     Bactrim [Sulfamethoxazole-Trimethoprim] Other (See Comments)     shakiness       Vitals: Blood pressure 128/71, pulse 79, temperature (!) 97.3 °F (36.3 °C), temperature source Oral, resp. rate 17, height 6' 1" (1.854 m), weight (!) 157 kg (345 lb 9.6 oz), SpO2 97 %., RA, Body mass index is 45.6 kg/m². Intake/Output Summary (Last 24 hours) at 10/17/2023 1600  Last data filed at 10/17/2023 1531  Gross per 24 hour   Intake 830 ml   Output 4850 ml   Net -4020 ml         Physical Exam  Constitutional:       General: He is not in acute distress. Appearance: He is obese. He is not ill-appearing. Cardiovascular:      Rate and Rhythm: Normal rate and regular rhythm. Pulmonary:      Breath sounds: Examination of the right-upper field reveals wheezing. Examination of the left-upper field reveals wheezing. Examination of the right-middle field reveals wheezing. Examination of the left-middle field reveals wheezing. Examination of the right-lower field reveals wheezing. Examination of the left-lower field reveals wheezing. Wheezing present. No decreased breath sounds or rales. Abdominal:      Palpations: Abdomen is soft. Tenderness: There is no abdominal tenderness. There is no guarding or rebound. Musculoskeletal:         General: Normal range of motion. Right lower leg: Edema (1) present. Left lower leg: Edema (1+) present. Neurological:      General: No focal deficit present. Mental Status: He is alert and oriented to person, place, and time. Psychiatric:         Mood and Affect: Mood normal.         Labs: I have personally reviewed pertinent lab results. Laboratory and Diagnostics  Results from last 7 days   Lab Units 10/17/23  0609 10/16/23  0807   WBC Thousand/uL 8.82 9.75   HEMOGLOBIN g/dL 10.3* 11.2*   HEMATOCRIT % 32.2* 35.3*   PLATELETS Thousands/uL 270 309   NEUTROS PCT % 86* 54   MONOS PCT % 7 8   EOS PCT % 0 22*       Results from last 7 days   Lab Units 10/17/23  0609 10/16/23  0807 10/12/23  0501 10/11/23  0522   SODIUM mmol/L 132* 137 137 135   POTASSIUM mmol/L 3.9 3.7 3.9 3.6   CHLORIDE mmol/L 94* 98 97 96   CO2 mmol/L 26 28 29 30   ANION GAP mmol/L 12 11 11 9   BUN mg/dL 37* 23 35* 34*   CREATININE mg/dL 1.75* 1.37* 2.03* 1.93*   CALCIUM mg/dL 8.8 8.9 8.4 8.4   GLUCOSE RANDOM mg/dL 370* 252* 167* 180*   ALT U/L  --  83*  --  128*   AST U/L  --  40*  --  169*   ALK PHOS U/L  --  94  --  85   ALBUMIN g/dL  --  3.6  --  3.6   TOTAL BILIRUBIN mg/dL  --  0.70  --  0.82       Results from last 7 days   Lab Units 10/17/23  0609   MAGNESIUM mg/dL 2.7        Results from last 7 days   Lab Units 10/12/23  0501   INR  1.29*   PTT seconds 33                                      Imaging and other studies: I have personally reviewed pertinent films in PACS  XR chest 1 view portable    Result Date: 10/7/2023  Impression: No acute cardiopulmonary disease. Workstation performed: [id number]     CT chest 10/16: negative for embolism. No pulmonary edema or ggo noted. Stable lung nodule on LLL    Pulmonary function testing:     PFT 2021: severe obstructive airflow with FEV1 of 39%  Sleep study 2019: moderate VICKY and mild hypoxia, CONNIE 18.9    EKG, Pathology, and Other Studies: I have personally reviewed pertinent reports.         Code Status: Level 1 - Full Code    Westley Acuna MD  Pulmonary/Critical Care Fellow  [facility]

## 2023-10-17 NOTE — ASSESSMENT & PLAN NOTE
Recent hospital admission w/ discharge earlier this month on 10/12/23 noted for CHF exacerbation  Last EF of 50% in April 2023 - s/p CardioMEMS placement in March 2023  [address] outpatient cardiology  Continue Bumex/Aldactone for diuresis and Toprol-XL for beta-blockade  Low-sodium/fluid restriction  Monitor/replete magnesium/potassium deficiencies if present

## 2023-10-17 NOTE — ASSESSMENT & PLAN NOTE
Lab Results   Component Value Date    HGBA1C 7.1 (A) 08/09/2023     A1C 7.1, only maintained on orals at home--Januvia held while hospitalized - continued Jardiance in the setting of known heart failure  On SSI only, given severe hyperglycemia from steroids, will add basal bolus insulin  Carbohydrate restriction  Hypoglycemia protocol

## 2023-10-17 NOTE — UTILIZATION REVIEW
NOTIFICATION OF INPATIENT ADMISSION   AUTHORIZATION REQUEST   SERVICING FACILITY:   [address] [place] [address]  Address: [address] [place] [address]  Tax ID: [id number]  NPI: [id number] ATTENDING PROVIDER:  Attending Name and NPI#: Brenda Jones Md [[id number]]  Address: [address] [place] [address]  Phone: [phone]   ADMISSION INFORMATION:  Place of Service: Inpatient [address] [facility]  Place of Service Code: 21  Inpatient Admission Date/Time: 10/16/23 11:24 AM  Discharge Date/Time: No discharge date for patient encounter. Admitting Diagnosis Code/Description:  CHF (congestive heart failure) ([address] [place] [address]) [I50.9]  A-fib (HCC) [I48.91]  HTN (hypertension) [I10]  SOB (shortness of breath) [R06.02]  DM (diabetes mellitus) ([address] [place] [address]) [E11.9]  HLD (hyperlipidemia) [E78.5]  COPD with acute exacerbation ([address] [place] [address]) [J44.1]     UTILIZATION REVIEW CONTACT:  Emma Keith, Utilization   Network Utilization Review Department  Phone: [phone]  Fax: [phone]  Email: [email or web address]  Contact for approvals/pending authorizations, clinical reviews, and discharge. PHYSICIAN ADVISORY SERVICES:  Medical Necessity Denial & Svgg-[id number]-[id number] Review  Phone: [phone]  Fax: [phone]  Email: [email or web address]     DISCHARGE SUPPORT TEAM:  For Patients Discharge Needs & Updates  Phone: [phone] opt. 2 Fax: [phone]  Email: [email or web address]

## 2023-10-17 NOTE — ASSESSMENT & PLAN NOTE
Persistently worsening shortness of breath with wheezing over the last several days acutely worsening over the last 24 hours (was recently admitted with hospital discharge on 10/12/23)  Loaded with IV Solu-Medrol in the ED -> continue 40 mg TID regimen w/ subsequent oral tapering with symptomatic improvement  Continue inhaled corticosteroid and Xopenex nebulization  S/P IV Magnesium administration to augment wheezing sensation  Due to severity of chronic asthma, Fasenra injections every 8 weeks (last received 2 weeks ago)  CT of chest negative for acute etiology/changes  Follows outpatient pulmonology, will ask for input given lack of significant improvement thus far

## 2023-10-17 NOTE — CASE MANAGEMENT
Case Management Assessment & Discharge Planning Note    Patient name Martine Dean  Location /-40 MRN [id number]  : 1965 Date 10/17/2023       Current Admission Date: 10/16/2023  Current Admission Diagnosis:Asthma exacerbation   Patient Active Problem List    Diagnosis Date Noted    Hyponatremia 10/17/2023    Anemia of chronic disease 10/16/2023    Nasal congestion 10/06/2023    Severe persistent asthma without complication     Loose stools 2023    Chronic diastolic CHF ([address])     Diabetic polyneuropathy associated with type 2 diabetes mellitus ([address]) 2022    CKD (chronic kidney disease) stage 3 ([address]) 2022    Presence of CardioMEMS HF system 2022    Paroxysmal atrial fibrillation ([address]) 2022    Chronic heart failure with preserved ejection fraction (HFpEF) ([address]) 2021    Severe persistent asthma 10/11/2021    Asthma exacerbation 2021    HNP (herniated nucleus pulposus), lumbar 2021    Foraminal stenosis of lumbar region 2021    VICKY (obstructive sleep apnea)     Hyperlipidemia 2019    Primary osteoarthritis of both knees 2018    Irritable bowel syndrome with diarrhea 2018    Primary hypertension 2018    Type 2 diabetes mellitus with diabetic polyneuropathy ([address]) 2018    Vitamin B12 deficiency 2016    Vitamin D deficiency 2016    Morbid obesity ([address]) 2016    Primary osteoarthritis of right knee 10/15/2013      LOS (days): 1  Geometric Mean LOS (GMLOS) (days):   Days to GMLOS:     OBJECTIVE:  PATIENT READMITTED TO HOSPITAL  Risk of Unplanned Readmission Score: 32.34         Current admission status: Inpatient       Preferred Pharmacy:   [address] [place] [address], [address] [place] [address]  Phone: [phone] Fax: [phone]    Primary Care Provider: Dennys Travis MD    Primary Insurance: Helen Restrepo  Secondary Insurance: ASSESSMENT:  Active Health Care Proxies       Mary Carmen Cano Representative - Spouse   Primary Phone: [phone] [facility])  Mobile Phone: [phone]                           Readmission Root Cause  30 Day Readmission: Yes  Who directed you to return to the hospital?: Self  Did you understand whom to contact if you had questions or problems?: Yes  Did you get your prescriptions before you left the hospital?: Yes  Were you able to get your prescriptions filled when you left the hospital?: Yes  Did you take your medications as prescribed?: Yes  Were you able to get to your follow-up appointments?: Yes  During previous admission, was a post-acute recommendation made?: No  Patient was readmitted due to: Asthma exacerbation  Action Plan: Medication and medical management. Patient Information  Admitted from[de-identified] Home  Mental Status: Alert  During Assessment patient was accompanied by: Not accompanied during assessment  Assessment information provided by[de-identified] Patient  Primary Caregiver: Self  Support Systems: Spouse/significant other  [facility]: [address] [place] [address] do you live in?: [facility] entry access options.  Select all that apply.: Stairs  Number of steps to enter home.: 6  Do the steps have railings?: Yes  Type of Current Residence: [address] [place] [address]  Upon entering residence, is there a bedroom on the main floor (no further steps)?: No  A bedroom is located on the following floor levels of residence (select all that apply):: 3rd Floor (30 steps to 3rd floor.)  Upon entering residence, is there a bathroom on the main floor (no further steps)?: No  Indicate which floors of current residence have a bathroom (select all the apply):: 2nd Floor (16 steps to 2nd floor.)  Number of steps to 2nd floor from main floor: One Flight (16 steps to 2nd floor.)  Number of steps to 3rd floor from main floor:  (30 steps to 3rd floor.)  In the last 12 months, was there a time when you were not able to pay the mortgage or rent on time?: No  In the last 12 months, how many places have you lived?: 1  In the last 12 months, was there a time when you did not have a steady place to sleep or slept in a shelter (including now)?: No  Homeless/housing insecurity resource given?: N/A  Living Arrangements: Lives w/ Spouse/significant other, Lives w/ Daughter  Is patient a ?: No    Activities of Daily Living Prior to Admission  Functional Status: Independent  Completes ADLs independently?: Yes  Ambulates independently?: Yes  Does patient use assisted devices?: No  Does patient currently own DME?: Yes  What DME does the patient currently own?: Straight Cane  Does patient have a history of Outpatient Therapy (PT/OT)?: Yes  Does the patient have a history of Short-Term Rehab?: No  Does patient have a history of HHC?: No  Does patient currently have [address]?: No         Patient Information Continued  Income Source: Unemployed  Does patient have prescription coverage?: Yes  Within the past 12 months, you worried that your food would run out before you got the money to buy more.: Never true  Within the past 12 months, the food you bought just didn't last and you didn't have money to get more.: Never true  Food insecurity resource given?: N/A  Does patient receive dialysis treatments?: No  Does patient have a history of substance abuse?: No  Does patient have a history of Mental Health Diagnosis?: No         Means of Transportation  Means of Transport to Appts[de-identified] Drives Self  In the past 12 months, has lack of transportation kept you from medical appointments or from getting medications?: No  In the past 12 months, has lack of transportation kept you from meetings, work, or from getting things needed for daily living?: No  Was application for public transport provided?: N/A        DISCHARGE DETAILS:    Discharge planning discussed with[de-identified] Patient at bedside.   Freedom of Choice: Yes  Comments - Freedom of Choice: No rehab needs at this time.  CM contacted family/caregiver?: No- see comments (Pt independent and able to make own decisions.)  Were Treatment Team discharge recommendations reviewed with patient/caregiver?: Yes  Did patient/caregiver verbalize understanding of patient care needs?: Yes  Were patient/caregiver advised of the risks associated with not following Treatment Team discharge recommendations?: Yes    Contacts  Patient Contacts: Patient  Relationship to Patient[de-identified] Other (Comment) (Patient)  Contact Method: In Person  Reason/Outcome: Continuity of Care, Discharge [facility]         Is the patient interested in [address] at discharge?: No    DME Referral Provided  Referral made for DME?: No    Other Referral/Resources/Interventions Provided:  Referral Comments: No referrals made at this time. No PT/OT consults. Additional Comments: CM met with pt at bedside and introduced self and role. Pt resides with his spouse in a 3-story home with 6 [address]. Pt reports having a cane at home but no using it at this time. Pt did not report any CM needs at this time. Pt might need Lyft ride home at discharge due to wife working and not being able to pick him up.

## 2023-10-17 NOTE — PROGRESS NOTES
[facility] [facility]  Progress Note  Name: Felipe Nation  MRN: [id number]  Unit/Bed#: -01 I Date of Admission: 10/16/2023   Date of Service: 10/17/2023 I Hospital Day: 1    Assessment/Plan   * Asthma exacerbation  Assessment & Plan  Persistently worsening shortness of breath with wheezing over the last several days acutely worsening over the last 24 hours (was recently admitted with hospital discharge on 10/12/23)  Loaded with IV Solu-Medrol in the ED -> continue 40 mg TID regimen w/ subsequent oral tapering with symptomatic improvement  Continue inhaled corticosteroid and Xopenex nebulization  S/P IV Magnesium administration to augment wheezing sensation  Due to severity of chronic asthma, Fasenra injections every 8 weeks (last received 2 weeks ago)  CT of chest negative for acute etiology/changes  Follows outpatient pulmonology, will ask for input given lack of significant improvement thus far    Chronic diastolic CHF ([address])  Assessment & Plan  Recent hospital admission w/ discharge earlier this month on 10/12/23 noted for CHF exacerbation  Last EF of 50% in April 2023 - s/p CardioMEMS placement in March 2023  [facility] outpatient cardiology  Continue Bumex/Aldactone for diuresis and Toprol-XL for beta-blockade  Low-sodium/fluid restriction  Monitor/replete magnesium/potassium deficiencies if present    Hyponatremia  Assessment & Plan  Corrects for normal when glucose factored in  Recommend ongoing glucose control   Monitor BMP    Anemia of chronic disease  Assessment & Plan  Monitor H/H    CKD (chronic kidney disease) stage 3 ([address])  Assessment & Plan  Baseline creatinine of approximately 1.3-1.5 -> presents stable at 1.37  Monitor renal function and urine output  Limit/avoid nephrotoxins as possible  Note chronic Bumex/Aldactone use    Paroxysmal atrial fibrillation ([address])  Assessment & Plan  Rate controlled on Toprol-XL  On Eliquis for anticoagulation    Morbid obesity [facility])  Assessment & Plan  BMI of 44.91  S/p prior gastric bypass   Lifestyle/diet modifications    Hyperlipidemia  Assessment & Plan  Continue statin    Type 2 diabetes mellitus with diabetic polyneuropathy [facility])  Assessment & Plan  Lab Results   Component Value Date    HGBA1C 7.1 (A) 08/09/2023     A1C 7.1, only maintained on orals at home--Januvia held while hospitalized - continued Jardiance in the setting of known heart failure  On SSI only, given severe hyperglycemia from steroids, will add basal bolus insulin  Carbohydrate restriction  Hypoglycemia protocol    Primary hypertension  Assessment & Plan  Continue Aldactone/Toprol-XL/Bumex  Additional PRN IV Hydralazine on board for BP spikes  Low-sodium diet               VTE Pharmacologic Prophylaxis:   Moderate Risk (Score 3-4) - Pharmacological DVT Prophylaxis Ordered: apixaban (Eliquis). Patient Centered Rounds: I performed bedside rounds with nursing staff today. Discussions with Specialists or Other Care Team Provider: nabila pulm and CM    Education and Discussions with Family / Patient: Patient declined call to . Total Time Spent on Date of Encounter in care of patient: 33 mins. This time was spent on one or more of the following: performing physical exam; counseling and coordination of care; obtaining or reviewing history; documenting in the medical record; reviewing/ordering tests, medications or procedures; communicating with other healthcare professionals and discussing with patient's family/caregivers. Current Length of Stay: 1 day(s)  Current Patient Status: Inpatient   Certification Statement: The patient will continue to require additional inpatient hospital stay due to IV steroids  Discharge Plan: Anticipate discharge in 48-72 hrs to home. Code Status: Level 1 - Full Code    Subjective:   Feels only mildly improved today, not much. With ongoing wheezing and chest tightness. Dry cough.  Denies increased leg swelling, weight gain or abdominal distension. Objective:     Vitals:   Temp (24hrs), Av.7 °F (36.5 °C), Min:97.3 °F (36.3 °C), Max:98.1 °F (36.7 °C)    Temp:  [97.3 °F (36.3 °C)-98.1 °F (36.7 °C)] 97.3 °F (36.3 °C)  HR:  [] 79  Resp:  [17-22] 17  BP: (128-165)/(70-79) 128/71  SpO2:  [94 %-97 %] 96 %  Body mass index is 45.6 kg/m². Input and Output Summary (last 24 hours): Intake/Output Summary (Last 24 hours) at 10/17/2023 1047  Last data filed at 10/17/2023 1004  Gross per 24 hour   Intake 830 ml   Output 3695 ml   Net -2865 ml       Physical Exam:   Physical Exam  Vitals and nursing note reviewed. Constitutional:       General: He is not in acute distress. Appearance: He is obese. Comments: On RA    Cardiovascular:      Rate and Rhythm: Normal rate. Pulmonary:      Effort: Pulmonary effort is normal.      Breath sounds: Wheezing present. Abdominal:      General: Bowel sounds are normal. There is distension. Palpations: Abdomen is soft. Tenderness: There is no abdominal tenderness. Musculoskeletal:      Right lower leg: No edema. Left lower leg: No edema. Skin:     Coloration: Skin is pale. Neurological:      Mental Status: He is oriented to person, place, and time.    Psychiatric:         Mood and Affect: Mood normal.          Additional Data:     Labs:  Results from last 7 days   Lab Units 10/17/23  0609   WBC Thousand/uL 8.82   HEMOGLOBIN g/dL 10.3*   HEMATOCRIT % 32.2*   PLATELETS Thousands/uL 270   NEUTROS PCT % 86*   LYMPHS PCT % 6*   MONOS PCT % 7   EOS PCT % 0     Results from last 7 days   Lab Units 10/17/23  0609 10/16/23  0807   SODIUM mmol/L 132* 137   POTASSIUM mmol/L 3.9 3.7   CHLORIDE mmol/L 94* 98   CO2 mmol/L 26 28   BUN mg/dL 37* 23   CREATININE mg/dL 1.75* 1.37*   ANION GAP mmol/L 12 11   CALCIUM mg/dL 8.8 8.9   ALBUMIN g/dL  --  3.6   TOTAL BILIRUBIN mg/dL  --  0.70   ALK PHOS U/L  --  94   ALT U/L  --  83*   AST U/L  --  40*   GLUCOSE RANDOM mg/dL 370* 252*     Results from last 7 days   Lab Units 10/12/23  0501   INR  1.29*     Results from last 7 days   Lab Units 10/17/23  0757 10/17/23  0528 10/16/23  2153 10/12/23  1201 10/12/23  0545 10/11/23  2023 10/11/23  1704 10/11/23  1149 10/11/23  0614 10/10/23  2049 10/10/23  1700 10/10/23  1206   POC GLUCOSE mg/dl 338* 373* 486* 231* 177* 240* 144* 253* 179* 266* 212* 238*               Lines/Drains:  Invasive Devices       Peripheral Intravenous Line  Duration             Peripheral IV 10/16/23 Right Antecubital 1 day                          Imaging: Reviewed radiology reports from this admission including: CT CAP    Recent Cultures (last 7 days):         Last 24 Hours Medication List:   Current Facility-Administered Medications   Medication Dose Route Frequency Provider Last Rate    acetaminophen  650 mg Oral Q6H PRN Kofi Nieto MD      apixaban  5 mg Oral BID Kofi Nieto MD      atorvastatin  10 mg Oral Daily With Galo Aguiar MD      bumetanide  6 mg Oral BID Kofi Nieto MD      Empagliflozin  10 mg Oral Daily Kofi Nieto MD      fluticasone  1 spray Nasal BID Kofi Nieto MD      hydrALAZINE  5 mg Intravenous Q6H PRN Kofi Nieto MD      insulin glargine  8 Units Subcutaneous QAM Vinay Lozoya PA-C      insulin lispro  2-12 Units Subcutaneous 4x Daily (AC & HS) Moni Rodríguez PA-C      insulin lispro  7 Units Subcutaneous TID With Meals Vinay Lozoya PA-C      levalbuterol  1.25 mg Nebulization TID Kofi Nieto MD      levalbuterol  1.25 mg Nebulization Q6H PRN Kofi Nieto MD      loratadine  10 mg Oral Daily Kofi Nieto MD      methylPREDNISolone sodium succinate  40 mg Intravenous Q8H [address] Kofi Nieto MD      metoprolol succinate  50 mg Oral Daily Kofi Nieto MD      ondansetron  4 mg Intravenous Q4H PRN Kofi Nieto MD      pantoprazole  40 mg Oral Daily Kofi Nieto MD      pregabalin  50 mg Oral HS Kofi Nieto MD      spironolactone  25 mg Oral Daily Kofi Nieto MD umeclidinium  1 puff Inhalation Daily Laura Quiros MD          Today, Patient Was Seen By: Natalia Kim PA-C    **Please Note: This note may have been constructed using a voice recognition system. **

## 2023-10-17 NOTE — UTILIZATION REVIEW
Initial Clinical Review    Admission: Date/Time/Statement:   Admission Orders (From admission, onward)       Ordered        10/16/23 1124  [address]  Once                          Orders Placed This Encounter   Procedures    INPATIENT ADMISSION     Standing Status:   Standing     Number of Occurrences:   1     Order Specific Question:   Level of Care     Answer:   Med Surg [16]     Order Specific Question:   Estimated length of stay     Answer:   More than 2 Midnights     Order Specific Question:   Certification     Answer:   I certify that inpatient services are medically necessary for this patient for a duration of greater than two midnights. See H&P and MD Progress Notes for additional information about the patient's course of treatment. ED Arrival Information       Expected   -    Arrival   10/16/2023 07:41    Acuity   Emergent              Means of arrival   Ambulance    Escorted by   [address] [place] [address] EMS    Service   Hospitalist    Admission type   Emergency              Arrival complaint   sob             Chief Complaint   Patient presents with    Shortness of Breath       Initial Presentation: 66 y.o. male who presented to [address] [place] [address] ED via EMS due to progressive worsening of shortness of breath with wheezing. Recently hospitalized and subsequent discharged just 4 days ago on 10/12/23, due to a CHF exacerbation. Patient reports after going home, he started to become short of breath again, however, this time with different symptoms than his recent hospitalization. He reported an increase in dry coughing with intractable wheezing episodes at times. In the ED, an exacerbation of his chronic asthma was suspected, and he was loaded with IV Solu-Medrol and placed on 2 L of O2 via NC. On exam, tachypneic, diminished breath sounds BL with coarse wheezing noted. PMHx:  Asthma, AFib, CHF, CAD, DM, gastric bypass, HLD, HTN.    Plan:  Admit to Inpatient status dt Asthma exacerbation, med surg, continue inhaled corticosteroid and neb txs, trial IV mag, low Na diet with fluid restriction, monitor electrolytes and replete prn, start accuchecks w/ SSI. Date: 10/17   Day 2:   Continues with wheezing and chest tightness, dry cough. Abdominal distention and wheezing noted. Continue above tx plan including neb txs, low Na diet w/ fl restriction, monitor labs, accuchecks.      ED Triage Vitals   Temperature Pulse Respirations Blood Pressure SpO2   10/16/23 0746 10/16/23 0745 10/16/23 0745 10/16/23 0745 10/16/23 0745   98.2 °F (36.8 °C) 90 22 148/74 97 %      Temp Source Heart Rate Source Patient Position - Orthostatic VS BP Location FiO2 (%)   10/16/23 0746 10/16/23 0745 10/16/23 0745 10/16/23 0745 --   Oral Monitor Lying Right arm       Pain Score       10/16/23 0745       No Pain          Wt Readings from Last 1 Encounters:   10/16/23 (!) 157 kg (345 lb 9.6 oz)     Additional Vital Signs:   ate/Time Temp Pulse Resp BP MAP (mmHg) SpO2 Calculated FIO2 (%) - Nasal Cannula Nasal Cannula O2 Flow Rate (L/min) O2 Device Patient Position - Orthostatic VS   10/17/23 0617 97.3 °F (36.3 °C) Abnormal  79 17 128/71 90 96 % -- -- None (Room air) --   10/16/23 23:20:57 98.1 °F (36.7 °C) 86 18 140/77 98 94 % -- -- -- --   10/16/23 1930 -- -- -- -- -- 94 % -- -- None (Room air) --   10/16/23 1700 -- -- -- -- -- -- 28 2 L/min Nasal cannula --   10/16/23 16:23:03 97.8 °F (36.6 °C) 101 20 142/79 100 95 % -- -- -- --   10/16/23 1243 -- 97 22 165/71 -- 95 % -- -- Nasal cannula  Sitting   O2 Device: 2L at 10/16/23 1243   10/16/23 1237 -- -- -- -- -- 97 % -- -- -- --   10/16/23 1130 -- 86 18 159/70 100 97 % -- -- None (Room air) Lying   10/16/23 1030 -- 90 20 163/69 99 99 % 28 2 L/min Nasal cannula Lying   10/16/23 0830 -- 82 20 141/63 91 100 % -- -- -- Lying   10/16/23 0821 -- 81 20 -- -- 98 % -- -- -- --   10/16/23 0748 -- -- -- -- -- -- -- -- Nasal cannula --   10/16/23 0746 98.2 °F (36.8 °C) -- -- -- -- -- -- -- -- --   10/16/23 0745 -- 90 22 148/74 -- 97 % -- -- Nasal cannula Lying     Pertinent Labs/Diagnostic Test Results:   10/16 EKG: NSR    CT chest abdomen pelvis w contrast   Final Result by Pelon Crews MD (10/16 1054)      No acute thoracic or abdominopelvic pathology            Workstation performed: [id number]               Results from last 7 days   Lab Units 10/17/23  0609 10/16/23  0807   WBC Thousand/uL 8.82 9.75   HEMOGLOBIN g/dL 10.3* 11.2*   HEMATOCRIT % 32.2* 35.3*   PLATELETS Thousands/uL 270 309   NEUTROS ABS Thousands/µL 7.57 5.46         Results from last 7 days   Lab Units 10/17/23  0609 10/16/23  0807 10/12/23  0501 10/11/23  0522   SODIUM mmol/L 132* 137 137 135   POTASSIUM mmol/L 3.9 3.7 3.9 3.6   CHLORIDE mmol/L 94* 98 97 96   CO2 mmol/L 26 28 29 30   ANION GAP mmol/L 12 11 11 9   BUN mg/dL 37* 23 35* 34*   CREATININE mg/dL 1.75* 1.37* 2.03* 1.93*   EGFR ml/min/1.73sq m 42 57 35 37   CALCIUM mg/dL 8.8 8.9 8.4 8.4   MAGNESIUM mg/dL 2.7  --   --   --      Results from last 7 days   Lab Units 10/16/23  0807 10/11/23  0522   AST U/L 40* 169*   ALT U/L 83* 128*   ALK PHOS U/L 94 85   TOTAL PROTEIN g/dL 7.1 6.6   ALBUMIN g/dL 3.6 3.6   TOTAL BILIRUBIN mg/dL 0.70 0.82     Results from last 7 days   Lab Units 10/17/23  0757 10/17/23  0528 10/16/23  2153 10/12/23  1201 10/12/23  0545 10/11/23  2023 10/11/23  1704 10/11/23  1149 10/11/23  0614 10/10/23  2049 10/10/23  1700 10/10/23  1206   POC GLUCOSE mg/dl 338* 373* 486* 231* 177* 240* 144* 253* 179* 266* 212* 238*     Results from last 7 days   Lab Units 10/17/23  0609 10/16/23  0807 10/12/23  0501 10/11/23  0522   GLUCOSE RANDOM mg/dL 370* 252* 167* 180*     Results from last 7 days   Lab Units 10/16/23  1024 10/16/23  0807   HS TNI 0HR ng/L  --  17   HS TNI 2HR ng/L 14  --    HSTNI D2 ng/L -3  --          Results from last 7 days   Lab Units 10/12/23  0501   PROTIME seconds 15.9*   INR  1.29*   PTT seconds 33     Results from last 7 days   Lab Units 10/16/23  0807 LIPASE u/L 66         ED Treatment:   Medication Administration from 10/16/2023 0741 to 10/16/2023 1617         Date/Time Order Dose Route Action     10/16/2023 0750 EDT ipratropium-albuterol (FOR EMS ONLY) (DUO-NEB) 0.5-2.5 mg/3 mL inhalation solution 3 mL 0 mL Does not apply Given to EMS     10/16/2023 0820 EDT albuterol inhalation solution 10 mg 10 mg Nebulization Given     10/16/2023 0820 EDT ipratropium (ATROVENT) 0.02 % inhalation solution 1 mg 1 mg Nebulization Given     10/16/2023 0820 EDT sodium chloride 0.9 % inhalation solution 3 mL 3 mL Nebulization Given     10/16/2023 1024 EDT methylPREDNISolone sodium succinate (Solu-MEDROL) injection 125 mg 125 mg Intravenous Given     10/16/2023 0943 EDT iohexol (OMNIPAQUE) 350 MG/ML injection (MULTI-DOSE) 100 mL 100 mL Intravenous Given     10/16/2023 1240 EDT Empagliflozin (JARDIANCE) tablet 10 mg 10 mg Oral Given     10/16/2023 1431 EDT levalbuterol (XOPENEX) inhalation solution 1.25 mg 1.25 mg Nebulization Given     10/16/2023 1429 EDT magnesium sulfate 2 g/50 mL IVPB (premix) 2 g 2 g Intravenous New Bag          Past Medical History:   Diagnosis Date    Acute gastritis without hemorrhage     last assessed 3/24/17    Asthma     Atrial fibrillation ([address])     Bilateral leg edema 02/19/2020    CHF (congestive heart failure) ([address])     Coronary artery disease     Daytime sleepiness 07/17/2019    Diabetes mellitus ([address])     Dyspnea on exertion 10/11/2021    Edema of both feet 01/23/2020    Gastric bypass status for obesity     Hyperlipidemia     Hypertension     Hypokalemia 11/14/2021    Impaired fasting glucose     last assessed 5/10/17    Knee sprain, bilateral 06/14/2018    Leg cramps 06/16/2022    Obesity     Viral gastroenteritis     last assessed 11/4/16     Present on Admission:   Asthma exacerbation   Chronic diastolic CHF ([address])   Type 2 diabetes mellitus with diabetic polyneuropathy ([address])   Morbid obesity ([address])   CKD (chronic kidney disease) stage 3 ([address]) Paroxysmal atrial fibrillation (HCC)   Hyperlipidemia   Primary hypertension      Admitting Diagnosis: CHF (congestive heart failure) (HCC) [I50.9]  A-fib (HCC) [I48.91]  HTN (hypertension) [I10]  SOB (shortness of breath) [R06.02]  DM (diabetes mellitus) ([address] [facility]) [E11.9]  HLD (hyperlipidemia) [E78.5]  COPD with acute exacerbation (HCC) [J44.1]  Age/Sex: 66 y.o. male  Admission Orders:  Scheduled Medications:  apixaban, 5 mg, Oral, BID  atorvastatin, 10 mg, Oral, Daily With Dinner  bumetanide, 6 mg, Oral, BID  Empagliflozin, 10 mg, Oral, Daily  fluticasone, 1 spray, Nasal, BID  insulin lispro, 2-12 Units, Subcutaneous, 4x Daily (AC & HS)  levalbuterol, 1.25 mg, Nebulization, TID  loratadine, 10 mg, Oral, Daily  methylPREDNISolone sodium succinate, 40 mg, Intravenous, Q8H ANA  metoprolol succinate, 50 mg, Oral, Daily  pantoprazole, 40 mg, Oral, Daily  pregabalin, 50 mg, Oral, HS  spironolactone, 25 mg, Oral, Daily  umeclidinium, 1 puff, Inhalation, Daily      Continuous IV Infusions: none     PRN Meds:  acetaminophen, 650 mg, Oral, Q6H PRN  hydrALAZINE, 5 mg, Intravenous, Q6H PRN  levalbuterol, 1.25 mg, Nebulization, Q6H PRN  ondansetron, 4 mg, Intravenous, Q4H PRN    Network Utilization Review Department  ATTENTION: Please call with any questions or concerns to [phone] and carefully listen to the prompts so that you are directed to the right person. All voicemails are confidential.   For Discharge needs, contact Care Management DC Support Team at [phone] opt. 2  Send all requests for admission clinical reviews, approved or denied determinations and any other requests to dedicated fax number below belonging to the campus where the patient is receiving treatment.  List of dedicated fax numbers for the Facilities:  [place] DENIALS (Administrative/Medical Necessity) [phone]   DISCHARGE SUPPORT TEAM (NETWORK) [id number] (Maternity/NICU/Pediatrics) [address] [phone]   [address] [phone]   [address] [facility] [address] [address] [facility] [address] [phone]

## 2023-10-18 ENCOUNTER — TELEPHONE (OUTPATIENT)
Dept: CARDIOLOGY CLINIC | Facility: CLINIC | Age: 58
End: 2023-10-18

## 2023-10-18 PROBLEM — D72.10 EOSINOPHILIA: Status: ACTIVE | Noted: 2023-10-18

## 2023-10-18 LAB
ANION GAP SERPL CALCULATED.3IONS-SCNC: 12 MMOL/L
BASOPHILS # BLD AUTO: 0.02 THOUSANDS/ÂΜL (ref 0–0.1)
BASOPHILS NFR BLD AUTO: 0 % (ref 0–1)
BUN SERPL-MCNC: 50 MG/DL (ref 5–25)
CALCIUM SERPL-MCNC: 8.5 MG/DL (ref 8.4–10.2)
CHLORIDE SERPL-SCNC: 93 MMOL/L (ref 96–108)
CO2 SERPL-SCNC: 28 MMOL/L (ref 21–32)
CREAT SERPL-MCNC: 2.01 MG/DL (ref 0.6–1.3)
EOSINOPHIL # BLD AUTO: 0 THOUSAND/ÂΜL (ref 0–0.61)
EOSINOPHIL NFR BLD AUTO: 0 % (ref 0–6)
ERYTHROCYTE [DISTWIDTH] IN BLOOD BY AUTOMATED COUNT: 16.4 % (ref 11.6–15.1)
GFR SERPL CREATININE-BSD FRML MDRD: 36 ML/MIN/1.73SQ M
GLUCOSE SERPL-MCNC: 296 MG/DL (ref 65–140)
GLUCOSE SERPL-MCNC: 356 MG/DL (ref 65–140)
GLUCOSE SERPL-MCNC: 368 MG/DL (ref 65–140)
GLUCOSE SERPL-MCNC: 398 MG/DL (ref 65–140)
GLUCOSE SERPL-MCNC: 400 MG/DL (ref 65–140)
GLUCOSE SERPL-MCNC: 405 MG/DL (ref 65–140)
HCT VFR BLD AUTO: 33.4 % (ref 36.5–49.3)
HGB BLD-MCNC: 10.4 G/DL (ref 12–17)
IMM GRANULOCYTES # BLD AUTO: 0.11 THOUSAND/UL (ref 0–0.2)
IMM GRANULOCYTES NFR BLD AUTO: 1 % (ref 0–2)
LYMPHOCYTES # BLD AUTO: 0.61 THOUSANDS/ÂΜL (ref 0.6–4.47)
LYMPHOCYTES NFR BLD AUTO: 5 % (ref 14–44)
MAGNESIUM SERPL-MCNC: 2.8 MG/DL (ref 1.9–2.7)
MCH RBC QN AUTO: 25.5 PG (ref 26.8–34.3)
MCHC RBC AUTO-ENTMCNC: 31.1 G/DL (ref 31.4–37.4)
MCV RBC AUTO: 82 FL (ref 82–98)
MONOCYTES # BLD AUTO: 0.56 THOUSAND/ÂΜL (ref 0.17–1.22)
MONOCYTES NFR BLD AUTO: 5 % (ref 4–12)
NEUTROPHILS # BLD AUTO: 10.41 THOUSANDS/ÂΜL (ref 1.85–7.62)
NEUTS SEG NFR BLD AUTO: 89 % (ref 43–75)
NRBC BLD AUTO-RTO: 0 /100 WBCS
PLATELET # BLD AUTO: 302 THOUSANDS/UL (ref 149–390)
PMV BLD AUTO: 11.5 FL (ref 8.9–12.7)
POTASSIUM SERPL-SCNC: 3.6 MMOL/L (ref 3.5–5.3)
RBC # BLD AUTO: 4.08 MILLION/UL (ref 3.88–5.62)
SODIUM SERPL-SCNC: 133 MMOL/L (ref 135–147)
WBC # BLD AUTO: 11.71 THOUSAND/UL (ref 4.31–10.16)

## 2023-10-18 PROCEDURE — 94664 DEMO&/EVAL PT USE INHALER: CPT

## 2023-10-18 PROCEDURE — 94640 AIRWAY INHALATION TREATMENT: CPT

## 2023-10-18 PROCEDURE — 99232 SBSQ HOSP IP/OBS MODERATE 35: CPT | Performed by: STUDENT IN AN ORGANIZED HEALTH CARE EDUCATION/TRAINING PROGRAM

## 2023-10-18 PROCEDURE — 82948 REAGENT STRIP/BLOOD GLUCOSE: CPT

## 2023-10-18 PROCEDURE — 80048 BASIC METABOLIC PNL TOTAL CA: CPT | Performed by: INTERNAL MEDICINE

## 2023-10-18 PROCEDURE — 83735 ASSAY OF MAGNESIUM: CPT | Performed by: INTERNAL MEDICINE

## 2023-10-18 PROCEDURE — 85025 COMPLETE CBC W/AUTO DIFF WBC: CPT | Performed by: INTERNAL MEDICINE

## 2023-10-18 PROCEDURE — 99232 SBSQ HOSP IP/OBS MODERATE 35: CPT | Performed by: INTERNAL MEDICINE

## 2023-10-18 PROCEDURE — 99223 1ST HOSP IP/OBS HIGH 75: CPT | Performed by: INTERNAL MEDICINE

## 2023-10-18 PROCEDURE — 94760 N-INVAS EAR/PLS OXIMETRY 1: CPT

## 2023-10-18 RX ORDER — LANOLIN ALCOHOL/MO/W.PET/CERES
3 CREAM (GRAM) TOPICAL
Status: DISCONTINUED | OUTPATIENT
Start: 2023-10-18 | End: 2023-10-20 | Stop reason: HOSPADM

## 2023-10-18 RX ORDER — INSULIN GLARGINE 100 [IU]/ML
14 INJECTION, SOLUTION SUBCUTANEOUS EVERY MORNING
Status: DISCONTINUED | OUTPATIENT
Start: 2023-10-19 | End: 2023-10-20 | Stop reason: HOSPADM

## 2023-10-18 RX ORDER — LEVALBUTEROL INHALATION SOLUTION 1.25 MG/3ML
1.25 SOLUTION RESPIRATORY (INHALATION)
Status: DISCONTINUED | OUTPATIENT
Start: 2023-10-18 | End: 2023-10-19

## 2023-10-18 RX ORDER — INSULIN LISPRO 100 [IU]/ML
12 INJECTION, SOLUTION INTRAVENOUS; SUBCUTANEOUS
Status: DISCONTINUED | OUTPATIENT
Start: 2023-10-18 | End: 2023-10-19

## 2023-10-18 RX ORDER — METHYLPREDNISOLONE SODIUM SUCCINATE 40 MG/ML
40 INJECTION, POWDER, LYOPHILIZED, FOR SOLUTION INTRAMUSCULAR; INTRAVENOUS EVERY 12 HOURS SCHEDULED
Status: DISCONTINUED | OUTPATIENT
Start: 2023-10-18 | End: 2023-10-18

## 2023-10-18 RX ORDER — PREDNISONE 20 MG/1
40 TABLET ORAL DAILY
Status: DISCONTINUED | OUTPATIENT
Start: 2023-10-19 | End: 2023-10-19

## 2023-10-18 RX ADMIN — Medication 3 MG: at 21:14

## 2023-10-18 RX ADMIN — INSULIN LISPRO 6 UNITS: 100 INJECTION, SOLUTION INTRAVENOUS; SUBCUTANEOUS at 21:49

## 2023-10-18 RX ADMIN — METHYLPREDNISOLONE SODIUM SUCCINATE 40 MG: 40 INJECTION, POWDER, FOR SOLUTION INTRAMUSCULAR; INTRAVENOUS at 06:22

## 2023-10-18 RX ADMIN — INSULIN GLARGINE 8 UNITS: 100 INJECTION, SOLUTION SUBCUTANEOUS at 08:19

## 2023-10-18 RX ADMIN — METOPROLOL SUCCINATE 50 MG: 50 TABLET, EXTENDED RELEASE ORAL at 08:23

## 2023-10-18 RX ADMIN — BUMETANIDE 6 MG: 2 TABLET ORAL at 08:18

## 2023-10-18 RX ADMIN — EMPAGLIFLOZIN 10 MG: 10 TABLET, FILM COATED ORAL at 08:18

## 2023-10-18 RX ADMIN — LEVALBUTEROL HYDROCHLORIDE 1.25 MG: 1.25 SOLUTION RESPIRATORY (INHALATION) at 20:25

## 2023-10-18 RX ADMIN — LEVALBUTEROL HYDROCHLORIDE 1.25 MG: 1.25 SOLUTION RESPIRATORY (INHALATION) at 12:09

## 2023-10-18 RX ADMIN — INSULIN LISPRO 12 UNITS: 100 INJECTION, SOLUTION INTRAVENOUS; SUBCUTANEOUS at 12:10

## 2023-10-18 RX ADMIN — IPRATROPIUM BROMIDE 0.5 MG: 0.5 SOLUTION RESPIRATORY (INHALATION) at 07:07

## 2023-10-18 RX ADMIN — LORATADINE 10 MG: 10 TABLET ORAL at 08:23

## 2023-10-18 RX ADMIN — APIXABAN 5 MG: 5 TABLET, FILM COATED ORAL at 17:30

## 2023-10-18 RX ADMIN — INSULIN LISPRO 10 UNITS: 100 INJECTION, SOLUTION INTRAVENOUS; SUBCUTANEOUS at 17:28

## 2023-10-18 RX ADMIN — FLUTICASONE PROPIONATE 1 SPRAY: 50 SPRAY, METERED NASAL at 17:30

## 2023-10-18 RX ADMIN — Medication 3 MG: at 01:18

## 2023-10-18 RX ADMIN — INSULIN LISPRO 12 UNITS: 100 INJECTION, SOLUTION INTRAVENOUS; SUBCUTANEOUS at 08:16

## 2023-10-18 RX ADMIN — INSULIN LISPRO 12 UNITS: 100 INJECTION, SOLUTION INTRAVENOUS; SUBCUTANEOUS at 12:09

## 2023-10-18 RX ADMIN — SPIRONOLACTONE 25 MG: 25 TABLET ORAL at 08:18

## 2023-10-18 RX ADMIN — INSULIN LISPRO 7 UNITS: 100 INJECTION, SOLUTION INTRAVENOUS; SUBCUTANEOUS at 08:16

## 2023-10-18 RX ADMIN — IPRATROPIUM BROMIDE 0.5 MG: 0.5 SOLUTION RESPIRATORY (INHALATION) at 12:09

## 2023-10-18 RX ADMIN — IPRATROPIUM BROMIDE 0.5 MG: 0.5 SOLUTION RESPIRATORY (INHALATION) at 20:24

## 2023-10-18 RX ADMIN — IPRATROPIUM BROMIDE 0.5 MG: 0.5 SOLUTION RESPIRATORY (INHALATION) at 15:38

## 2023-10-18 RX ADMIN — INSULIN LISPRO 12 UNITS: 100 INJECTION, SOLUTION INTRAVENOUS; SUBCUTANEOUS at 17:29

## 2023-10-18 RX ADMIN — PANTOPRAZOLE SODIUM 40 MG: 40 TABLET, DELAYED RELEASE ORAL at 08:18

## 2023-10-18 RX ADMIN — LEVALBUTEROL HYDROCHLORIDE 1.25 MG: 1.25 SOLUTION RESPIRATORY (INHALATION) at 07:09

## 2023-10-18 RX ADMIN — BUMETANIDE 6 MG: 2 TABLET ORAL at 17:31

## 2023-10-18 RX ADMIN — FLUTICASONE PROPIONATE 1 SPRAY: 50 SPRAY, METERED NASAL at 08:17

## 2023-10-18 RX ADMIN — PREGABALIN 50 MG: 50 CAPSULE ORAL at 21:14

## 2023-10-18 RX ADMIN — ATORVASTATIN CALCIUM 10 MG: 10 TABLET, FILM COATED ORAL at 17:31

## 2023-10-18 RX ADMIN — LEVALBUTEROL HYDROCHLORIDE 1.25 MG: 1.25 SOLUTION RESPIRATORY (INHALATION) at 15:40

## 2023-10-18 RX ADMIN — APIXABAN 5 MG: 5 TABLET, FILM COATED ORAL at 08:18

## 2023-10-18 NOTE — ASSESSMENT & PLAN NOTE
Lab Results   Component Value Date    HGBA1C 7.1 (A) 08/09/2023     A1C 7.1, only maintained on orals at home--Januvia held while hospitalized - continued Jardiance in the setting of known heart failure  With severe hyperglycemia from steroids, added basal/bolus insulin, will increase on 10/18  Carbohydrate restriction  Hypoglycemia protocol

## 2023-10-18 NOTE — PROGRESS NOTES
Pastoral Care Progress Note    10/18/2023  Patient: Martine Smith : 1965  Admission Date & Time: 10/16/2023 0741  MRN: [id number] [facility]: [id number]         met with pt while rounding. Pt seemed to be at ease with situation. Pt noted he was released but readmitted due to a differ problem. Pt believes he will be released in a day or two.  with check back on pt give an opportunity.              Chaplaincy Interventions Utilized:   Empowerment: Normalized experience of patient/family    Exploration: Explored emotional needs & resources and Facilitated life review        Relationship Building: Cultivated a relationship of care and support and Listened empathically        Chaplaincy Outcomes Achieved:  Emotional resources utilized, Expressed gratitude, and Expressed intermediate hope          Spiritual Coping Strategies Utilized:   Connectedness and No spiritual coping       10/18/23 1300   Clinical Encounter Type   Visited With Patient   Routine Visit Follow-up

## 2023-10-18 NOTE — ASSESSMENT & PLAN NOTE
Persistently worsening shortness of breath with wheezing over the last several days acutely worsening over the last 24 hours (was recently admitted with hospital discharge on 10/12/23).  Patient on triple therapy(LAMA/LABA/ICS) and recently started Aditi Mote (last injection 2 weeks PTA)  Loaded with IV Solu-Medrol in the ED, will transition to IV solumedrol to 40 mg BID on 10/18--may be able to transition to PO prednisone, await pulm input   Continue inhaled corticosteroid and Xopenex nebulization  S/P IV Magnesium on admission  CT of chest negative for acute etiology/changes  Pulmonology consult appreciated, agree with above  Recommended hematology consult given significant eosinophilia

## 2023-10-18 NOTE — ASSESSMENT & PLAN NOTE
Recent hospital admission w/ discharge earlier this month on 10/12/23 noted for CHF exacerbation  Last EF of 50% in April 2023 - s/p CardioMEMS placement in March 2023  BNP minimally elevated however weights down from prior admission  Follows outpatient cardiology  Continue Bumex/Aldactone for diuresis and Toprol-XL for beta-blockade  Low-sodium/fluid restriction

## 2023-10-18 NOTE — ASSESSMENT & PLAN NOTE
Baseline creatinine of approximately 1.3-1.5 -> presented in stable range at 1.37. Worsened a bit on 10/17 and again on 10/18 however appears stable volume status wise.    Monitor renal function and urine output  Limit/avoid nephrotoxins as possible  Note chronic Bumex/Aldactone use  AM BMP

## 2023-10-18 NOTE — PROGRESS NOTES
[address] [facility]  Progress Note  Name: Ian Blizzard  MRN: [id number]  Unit/Bed#: -01 I Date of Admission: 10/16/2023   Date of Service: 10/18/2023 I Hospital Day: 2    Assessment/Plan   * Asthma exacerbation  Assessment & Plan  Persistently worsening shortness of breath with wheezing over the last several days acutely worsening over the last 24 hours (was recently admitted with hospital discharge on 10/12/23). Patient on triple therapy(LAMA/LABA/ICS) and recently started Devi Servando (last injection 2 weeks PTA)  Loaded with IV Solu-Medrol in the ED, will transition to IV solumedrol to 40 mg BID on 10/18--may be able to transition to PO prednisone, await pulm input   Continue inhaled corticosteroid and Xopenex nebulization  S/P IV Magnesium on admission  CT of chest negative for acute etiology/changes  Pulmonology consult appreciated, agree with above  Recommended hematology consult given significant eosinophilia     Chronic diastolic CHF [facility])  Assessment & Plan  Recent hospital admission w/ discharge earlier this month on 10/12/23 noted for CHF exacerbation  Last EF of 50% in April 2023 - s/p CardioMEMS placement in March 2023  BNP minimally elevated however weights down from prior admission  Follows outpatient cardiology  Continue Bumex/Aldactone for diuresis and Toprol-XL for beta-blockade  Low-sodium/fluid restriction    Eosinophilia  Assessment & Plan  ?related to asthma/atopy however pulmonology recommended consultation with hematology for further workup  Appreciate input  Additional labs pending     Hyponatremia  Assessment & Plan  Corrects for normal when glucose factored in  Recommend ongoing glucose control   Monitor BMP    Anemia of chronic disease  Assessment & Plan  Monitor H/H    CKD (chronic kidney disease) stage 3 ([address])  Assessment & Plan  Baseline creatinine of approximately 1.3-1.5 -> presented in stable range at 1.37.  Worsened a bit on 10/17 and again on 10/18 however appears stable volume status wise. Monitor renal function and urine output  Limit/avoid nephrotoxins as possible  Note chronic Bumex/Aldactone use  AM BMP    Paroxysmal atrial fibrillation (HCC)  Assessment & Plan  Rate controlled on Toprol-XL  On Eliquis for anticoagulation    Morbid obesity (HCC)  Assessment & Plan  BMI of 44.91  S/p prior gastric bypass   Lifestyle/diet modifications    Hyperlipidemia  Assessment & Plan  Continue statin    Type 2 diabetes mellitus with diabetic polyneuropathy (HCC)  Assessment & Plan  Lab Results   Component Value Date    HGBA1C 7.1 (A) 08/09/2023     A1C 7.1, only maintained on orals at home--Januvia held while hospitalized - continued Jardiance in the setting of known heart failure  With severe hyperglycemia from steroids, added basal/bolus insulin, will increase on 10/18  Carbohydrate restriction  Hypoglycemia protocol    Primary hypertension  Assessment & Plan  Continue Aldactone/Toprol-XL/Bumex  Additional PRN IV Hydralazine on board for BP spikes  Low-sodium diet             VTE Pharmacologic Prophylaxis:   Moderate Risk (Score 3-4) - Pharmacological DVT Prophylaxis Ordered: apixaban (Eliquis). Patient Centered Rounds: I performed bedside rounds with nursing staff today. Discussions with Specialists or Other Care Team Provider: appreciate hematolgoy and pulmonology input. D/w CM    Education and Discussions with Family / Patient: Patient declined call to . Total Time Spent on Date of Encounter in care of patient: 34 mins. This time was spent on one or more of the following: performing physical exam; counseling and coordination of care; obtaining or reviewing history; documenting in the medical record; reviewing/ordering tests, medications or procedures; communicating with other healthcare professionals and discussing with patient's family/caregivers.     Current Length of Stay: 2 day(s)  Current Patient Status: Inpatient   Certification Statement: The patient will continue to require additional inpatient hospital stay due to weaning steroids, monitoring creatinine, hematology evaluation. Discharge Plan: Anticipate discharge in 24-48 hrs to home. Code Status: Level 1 - Full Code    Subjective:   Feels better today. Not yet at baseline but wheezing and cough improving. Denies increased SOB, edema. Objective:     Vitals:   Temp (24hrs), Av °F (35.6 °C), Min:96 °F (35.6 °C), Max:96 °F (35.6 °C)    Temp:  [96 °F (35.6 °C)] 96 °F (35.6 °C)  HR:  [87-91] 87  Resp:  [18-20] 20  BP: (128-138)/(70-83) 128/70  SpO2:  [94 %-97 %] 94 %  Body mass index is 44.8 kg/m². Input and Output Summary (last 24 hours): Intake/Output Summary (Last 24 hours) at 10/18/2023 0947  Last data filed at 10/17/2023 2300  Gross per 24 hour   Intake 420 ml   Output 2725 ml   Net -2305 ml       Physical Exam:   Physical Exam  Vitals and nursing note reviewed. Constitutional:       General: He is not in acute distress. Appearance: He is obese. Comments: On RA    Cardiovascular:      Rate and Rhythm: Normal rate and regular rhythm. Pulmonary:      Effort: No respiratory distress. Breath sounds: No wheezing. Comments: Decreased   Abdominal:      General: There is no distension. Tenderness: There is no abdominal tenderness. Musculoskeletal:      Right lower leg: Edema (trace) present. Left lower leg: Edema (trace) present. Neurological:      Mental Status: He is oriented to person, place, and time.    Psychiatric:         Mood and Affect: Mood normal.          Additional Data:     Labs:  Results from last 7 days   Lab Units 10/18/23  0622   WBC Thousand/uL 11.71*   HEMOGLOBIN g/dL 10.4*   HEMATOCRIT % 33.4*   PLATELETS Thousands/uL 302   NEUTROS PCT % 89*   LYMPHS PCT % 5*   MONOS PCT % 5   EOS PCT % 0     Results from last 7 days   Lab Units 10/18/23  0622 10/17/23  0609 10/16/23  0807   SODIUM mmol/L 133*   < > 137   POTASSIUM mmol/L 3.6   < > 3.7   CHLORIDE mmol/L 93*   < > 98   CO2 mmol/L 28   < > 28   BUN mg/dL 50*   < > 23   CREATININE mg/dL 2.01*   < > 1.37*   ANION GAP mmol/L 12   < > 11   CALCIUM mg/dL 8.5   < > 8.9   ALBUMIN g/dL  --   --  3.6   TOTAL BILIRUBIN mg/dL  --   --  0.70   ALK PHOS U/L  --   --  94   ALT U/L  --   --  83*   AST U/L  --   --  40*   GLUCOSE RANDOM mg/dL 368*   < > 252*    < > = values in this interval not displayed. Results from last 7 days   Lab Units 10/12/23  0501   INR  1.29*     Results from last 7 days   Lab Units 10/18/23  0804 10/18/23  0621 10/17/23  2041 10/17/23  1719 10/17/23  1203 10/17/23  0757 10/17/23  0528 10/16/23  2153 10/12/23  1201 10/12/23  0545 10/11/23  2023 10/11/23  1704   POC GLUCOSE mg/dl 405* 398* 415* 417* 350* 338* 373* 486* 231* 177* 240* 144*               Lines/Drains:  Invasive Devices       Peripheral Intravenous Line  Duration             Peripheral IV 10/16/23 Right Antecubital 2 days                          Imaging: No pertinent imaging reviewed.     Recent Cultures (last 7 days):         Last 24 Hours Medication List:   Current Facility-Administered Medications   Medication Dose Route Frequency Provider Last Rate    acetaminophen  650 mg Oral Q6H PRN Brady Garcia MD      apixaban  5 mg Oral BID Brady Garcia MD      atorvastatin  10 mg Oral Daily With Jay Rios MD      bumetanide  6 mg Oral BID Brady Garcia MD      Empagliflozin  10 mg Oral Daily Brady Garcia MD      fluticasone  1 spray Nasal BID Brady Garcia MD      hydrALAZINE  5 mg Intravenous Q6H PRN MD Mauricio Morrison ON 10/19/2023] insulin glargine  14 Units Subcutaneous QAM Leti Olivera PA-C      insulin lispro  12 Units Subcutaneous TID With Meals Leti Olivera PA-C      insulin lispro  2-12 Units Subcutaneous 4x Daily (AC & HS) Moni Rodríguez PA-C      ipratropium  0.5 mg Nebulization 4x Daily Omid Thomas MD      levalbuterol  1.25 mg Nebulization Q6H PRN Brady Garcia MD levalbuterol  1.25 mg Nebulization 4x Daily Omid Thomas MD      loratadine  10 mg Oral Daily Bethanie Conn MD      melatonin  3 mg Oral HS Moni Rodríguze PA-C      methylPREDNISolone sodium succinate  40 mg Intravenous Q12H [facility] Santo Mcginnis PA-C      metoprolol succinate  50 mg Oral Daily Bethanie Conn MD      ondansetron  4 mg Intravenous Q4H PRN Bethanie Conn MD      pantoprazole  40 mg Oral Daily Bethanie Conn MD      pregabalin  50 mg Oral HS Bethanie Conn MD      spironolactone  25 mg Oral Daily Bethanie Conn MD          Today, Patient Was Seen By: Santo Mcginnis PA-C    **Please Note: This note may have been constructed using a voice recognition system. **

## 2023-10-18 NOTE — ASSESSMENT & PLAN NOTE
?related to asthma/atopy however pulmonology recommended consultation with hematology for further workup  Appreciate input  Additional labs pending

## 2023-10-18 NOTE — PLAN OF CARE
Problem: PAIN - ADULT  Goal: Verbalizes/displays adequate comfort level or baseline comfort level  Description: Interventions:  - Encourage patient to monitor pain and request assistance  - Assess pain using appropriate pain scale  - Administer analgesics based on type and severity of pain and evaluate response  - Implement non-pharmacological measures as appropriate and evaluate response  - Consider cultural and social influences on pain and pain management  - Notify physician/advanced practitioner if interventions unsuccessful or patient reports new pain  Outcome: Progressing     Problem: INFECTION - ADULT  Goal: Absence or prevention of progression during hospitalization  Description: INTERVENTIONS:  - Assess and monitor for signs and symptoms of infection  - Monitor lab/diagnostic results  - Monitor all insertion sites, i.e. indwelling lines, tubes, and drains  - Monitor endotracheal if appropriate and nasal secretions for changes in amount and color  - Maynard appropriate cooling/warming therapies per order  - Administer medications as ordered  - Instruct and encourage patient and family to use good hand hygiene technique  - Identify and instruct in appropriate isolation precautions for identified infection/condition  10/18/2023 1322 by Cristina Jackson RN  Outcome: Not Progressing  10/18/2023 1321 by Cristina Jackson RN  Outcome: Progressing  Goal: Absence of fever/infection during neutropenic period  Description: INTERVENTIONS:  - Monitor WBC    10/18/2023 1322 by Cristina Jackson RN  Outcome: Progressing  10/18/2023 1321 by Cristina Jackson RN  Outcome: Progressing

## 2023-10-18 NOTE — CONSULTS
Medical Oncology/Hematology Consult Note  Natacha Moore, male, 66 y.o., 1965,  [birth year], [id number]     Assessment and Plan  1. Eosinophilia  2. Asthma Exacerbation  3. Diastolic CHF    -Patient presenting with asthma exacerbation, we were consulted for eosinophilia. -From 10/6 %Eosinophils 16 -> 18 -> 22 on 10/16 -> 0 on 10/17.  -From 10/6 Eosinophils 1.58 -> 1.37 -> 2.1 on 10/16 -> 0 on 10/17.  -Reviewing prior labs, patient has had eosinophilia on and off for several years now.   -No clear contributors from history - no obvious travel or work exposure.  -Eosinophilia appears reactive, may be in the setting of chronic asthma.  -Previous labs from 9/6/22 showed negative ARACELI, C-ANCA, CCP, DS DNA, P-ANCA, RF.  -ARACELI, RF, ANCA, CCP, DS DNA, strongyloides ab currently pending.  -No heme onc follow up needed, would continue to follow with pulmonary and PCP with routine CBC monitoring. Outpatient follow up plan: None needed. I did review this patient with Dr. Steven Marie and he is in agreement with this plan. Reason for consultation: Eosinophilia    History of present illness:  Natacha Moore is a 66 y.o. male with PMH including CHF, afib, asthma, diabetes, HLD, HTN who presented to the ED on 10/16 with symptoms of worsening asthma exacerbation. Patient was admitted and given solu-medrol and respiratory treatments with good improvement in symptoms. We were consulted for eosinophilia. Patient seen and examined. He feels well and feels back to his baseline health. Notes that ever since he left the hospital after his previous admission for CHF exacerbation his symptoms have been getting worse at home. At baseline however, his asthma is relatively well controlled needing to use inhaler ~2 times weekly, no nighttime awakenings. No history of inflammatory or immunological conditions in self or family. No recent travel. Patient previously worked as a  but is now applying for disability.  No recent infection with notably antibiotic use per patient. Review of Systems:   Review of Systems   Constitutional:  Negative for chills, fatigue and fever. HENT:  Negative for hearing loss and sore throat. Eyes:  Negative for visual disturbance. Respiratory:  Negative for cough and shortness of breath. Cardiovascular:  Negative for chest pain, palpitations and leg swelling. Gastrointestinal:  Negative for abdominal distention, abdominal pain, constipation, diarrhea, nausea and vomiting. Endocrine: Negative for polyuria. Genitourinary:  Negative for hematuria. Musculoskeletal:  Negative for arthralgias and back pain. Skin:  Negative for rash and wound. Neurological:  Negative for dizziness, weakness, light-headedness and headaches. Psychiatric/Behavioral:  The patient is not nervous/anxious. Oncology History:   Cancer Staging   No matching staging information was found for the patient. Oncology History    No history exists. Past Medical History:   Past Medical History:   Diagnosis Date    Acute gastritis without hemorrhage     last assessed 3/24/17    Asthma     Atrial fibrillation ([address])     Bilateral leg edema 02/19/2020    CHF (congestive heart failure) (HCC)     Coronary artery disease     Daytime sleepiness 07/17/2019    Diabetes mellitus ([address])     Dyspnea on exertion 10/11/2021    Edema of both feet 01/23/2020    Gastric bypass status for obesity     Hyperlipidemia     Hypertension     Hypokalemia 11/14/2021    Impaired fasting glucose     last assessed 5/10/17    Knee sprain, bilateral 06/14/2018    Leg cramps 06/16/2022    Obesity     Viral gastroenteritis     last assessed 11/4/16       Past Surgical History:   Procedure Laterality Date    CARDIAC CATHETERIZATION N/A 3/9/2022    Procedure: CARDIAC RHC W/ PRESSURE SENSOR;  Surgeon: Garrick Gowers, MD;  Location: [facility];   Service: Cardiology    CARDIAC CATHETERIZATION N/A 9/6/2022    Procedure: Cardiac catheterization;  Surgeon: Bari Power DO;  Location: [facility]; Service: Cardiology    CARDIAC CATHETERIZATION N/A 2022    Procedure: Cardiac Coronary Angiogram;  Surgeon: Bari Pwoer DO;  Location: [facility]; Service: Cardiology    CARDIAC CATHETERIZATION N/A 10/11/2023    Procedure: Cardiac RHC; Surgeon: Sudeep Varghese MD;  Location: [facility]; Service: Cardiology    CARPAL TUNNEL RELEASE      bilateral    GASTRIC BYPASS      with han en y    HERNIA REPAIR      ventral inscisional    TONSILLECTOMY         Family History   Problem Relation Age of Onset    Stroke Mother     Hypertension Father     Cancer Father     COPD Father        Social History     Socioeconomic History    Marital status: /Civil Union     Spouse name: Not on file    Number of children: Not on file    Years of education: Not on file    Highest education level: Not on file   Occupational History    Not on file   Tobacco Use    Smoking status: Former     Types: Pipe, Cigars     Start date:      Quit date: 2021     Years since quittin.7    Smokeless tobacco: Never    Tobacco comments:     cigar once a day   Vaping Use    Vaping Use: Never used   Substance and Sexual Activity    Alcohol use:  Yes     Alcohol/week: 2.0 standard drinks of alcohol     Types: 2 Shots of liquor per week     Comment: social    Drug use: No    Sexual activity: Yes     Partners: Female     Birth control/protection: None   Other Topics Concern    Not on file   Social History Narrative    Not on file     Social Determinants of Health     Financial Resource Strain: Not on file   Food Insecurity: No Food Insecurity (10/17/2023)    Hunger Vital Sign     Worried About Running Out of Food in the Last Year: Never true     Ran Out of Food in the Last Year: Never true   Transportation Needs: No Transportation Needs (10/17/2023)    PRAPARE - Transportation     Lack of Transportation (Medical): No     Lack of Transportation (Non-Medical):  No   Physical Activity: Not on file   Stress: Not on file   Social Connections: Not on file   Intimate Partner Violence: Not on file   Housing Stability: Low Risk  (10/17/2023)    Housing Stability Vital Sign     Unable to Pay for Housing in the Last Year: No     Number of Places Lived in the Last Year: 1     Unstable Housing in the Last Year: No         Current Facility-Administered Medications:     acetaminophen (TYLENOL) tablet 650 mg, 650 mg, Oral, Q6H PRN, Dahlia Ashford MD    apixaban (ELIQUIS) tablet 5 mg, 5 mg, Oral, BID, Dahlia Ashford MD, 5 mg at 10/18/23 0818    atorvastatin (LIPITOR) tablet 10 mg, 10 mg, Oral, Daily With Jaime Guerra MD, 10 mg at 10/17/23 1746    bumetanide (BUMEX) tablet 6 mg, 6 mg, Oral, BID, Dahlia Ashford MD, 6 mg at 10/18/23 0818    Empagliflozin (JARDIANCE) tablet 10 mg, 10 mg, Oral, Daily, Dahlia Ashford MD, 10 mg at 10/18/23 0818    fluticasone (FLONASE) 50 mcg/act nasal spray 1 spray, 1 spray, Nasal, BID, Dahlia Ashford MD, 1 spray at 10/18/23 0817    hydrALAZINE (APRESOLINE) injection 5 mg, 5 mg, Intravenous, Q6H PRN, Dahlia Ashford MD    [START ON 10/19/2023] insulin glargine (LANTUS) subcutaneous injection 14 Units 0.14 mL, 14 Units, Subcutaneous, QAM, Jana Zhang PA-C    insulin lispro (HumaLOG) 100 units/mL subcutaneous injection 12 Units, 12 Units, Subcutaneous, TID With Meals, Jana Zhang PA-C    insulin lispro (HumaLOG) 100 units/mL subcutaneous injection 2-12 Units, 2-12 Units, Subcutaneous, 4x Daily (AC & HS), 12 Units at 10/18/23 0816 **AND** Fingerstick Glucose (POCT), , , 4x Daily AC and at bedtime, Moni Rodríguez PA-C    ipratropium (ATROVENT) 0.02 % inhalation solution 0.5 mg, 0.5 mg, Nebulization, 4x Daily, Omid Thomas MD, 0.5 mg at 10/18/23 0707    levalbuterol (XOPENEX) inhalation solution 1.25 mg, 1.25 mg, Nebulization, Q6H PRN, Dahlia Ashford MD    levalbuterol (XOPENEX) inhalation solution 1.25 mg, 1.25 mg, Nebulization, 4x Daily, Omid Thomas MD, 1.25 mg at 10/18/23 0709    loratadine (CLARITIN) tablet 10 mg, 10 mg, Oral, Daily, Siri Aguiar MD, 10 mg at 10/18/23 2601    melatonin tablet 3 mg, 3 mg, Oral, HS, Moni Rodríguez PA-C, 3 mg at 10/18/23 0118    methylPREDNISolone sodium succinate (Solu-MEDROL) injection 40 mg, 40 mg, Intravenous, Q12H ANA, Alondra Kaba PA-C    metoprolol succinate (TOPROL-XL) 24 hr tablet 50 mg, 50 mg, Oral, Daily, Siri Aguiar MD, 50 mg at 10/18/23 0823    ondansetron (ZOFRAN) injection 4 mg, 4 mg, Intravenous, Q4H PRN, Siri Aguiar MD    pantoprazole (PROTONIX) EC tablet 40 mg, 40 mg, Oral, Daily, Siri Aguiar MD, 40 mg at 10/18/23 0818    pregabalin (LYRICA) capsule 50 mg, 50 mg, Oral, HS, Siri Aguiar MD, 50 mg at 10/17/23 2112    spironolactone (ALDACTONE) tablet 25 mg, 25 mg, Oral, Daily, Siri Aguiar MD, 25 mg at 10/18/23 0818    Medications Prior to Admission   Medication    Accu-Chek FastClix Lancets MISC    albuterol (PROVENTIL HFA,VENTOLIN HFA) 90 mcg/act inhaler    apixaban (Eliquis) 5 mg    atorvastatin (LIPITOR) 10 mg tablet    Blood Glucose Monitoring Suppl (Accu-Chek Guide) w/Device KIT    budesonide-formoterol (Symbicort) 160-4.5 mcg/act inhaler    bumetanide (BUMEX) 1 mg tablet    Empagliflozin (JARDIANCE) 10 MG TABS tablet    Fasenra Pen 30 MG/ML SOAJ    fluticasone (FLONASE) 50 mcg/act nasal spray    loratadine (CLARITIN) 10 mg tablet    metolazone (ZAROXOLYN) 2.5 mg tablet    metoprolol succinate (TOPROL-XL) 50 mg 24 hr tablet    pantoprazole (PROTONIX) 40 mg tablet    pregabalin (LYRICA) 50 mg capsule    sitaGLIPtin (Januvia) 100 mg tablet    Spiriva Respimat 1.25 MCG/ACT AERS inhaler    spironolactone (ALDACTONE) 25 mg tablet    EPINEPHrine (EPIPEN) 0.3 mg/0.3 mL SOAJ    montelukast (SINGULAIR) 10 mg tablet    potassium chloride (K-DUR,KLOR-CON) 20 mEq tablet       Allergies   Allergen Reactions    Azithromycin Other (See Comments)     Shaky, uneasy feeling Bactrim [Sulfamethoxazole-Trimethoprim] Other (See Comments)     shakiness         Physical Exam:     /70   Pulse 87   Temp (!) 96 °F (35.6 °C)   Resp 20   Ht 6' 1" (1.854 m)   Wt (!) 154 kg (339 lb 9.6 oz)   SpO2 94%   BMI 44.80 kg/m²     Physical Exam  Vitals reviewed. Constitutional:       Appearance: Normal appearance. He is obese. HENT:      Head: Normocephalic and atraumatic. Right Ear: External ear normal.      Left Ear: External ear normal.      Nose: Nose normal.      Mouth/Throat:      Mouth: Mucous membranes are moist.   Eyes:      Extraocular Movements: Extraocular movements intact. Pupils: Pupils are equal, round, and reactive to light. Cardiovascular:      Rate and Rhythm: Normal rate and regular rhythm. Pulses: Normal pulses. Heart sounds: Normal heart sounds. Pulmonary:      Effort: Pulmonary effort is normal.   Abdominal:      General: Abdomen is flat. Bowel sounds are normal.      Palpations: Abdomen is soft. Musculoskeletal:         General: Swelling present. Cervical back: No rigidity or tenderness. Right lower leg: Edema present. Left lower leg: Edema present. Comments: B/L lower extremity edema   Skin:     General: Skin is warm. Capillary Refill: Capillary refill takes less than 2 seconds. Neurological:      General: No focal deficit present. Mental Status: He is alert and oriented to person, place, and time.          Recent Results (from the past 48 hour(s))   HS Troponin I 2hr    Collection Time: 10/16/23 10:24 AM   Result Value Ref Range    hs TnI 2hr 14 "Refer to ACS Flowchart"- see link ng/L    Delta 2hr hsTnI -3 <20 ng/L   Fingerstick Glucose (POCT)    Collection Time: 10/16/23  9:53 PM   Result Value Ref Range    POC Glucose 486 (H) 65 - 140 mg/dl   Fingerstick Glucose (POCT)    Collection Time: 10/17/23  5:28 AM   Result Value Ref Range    POC Glucose 373 (H) 65 - 140 mg/dl   Basic metabolic panel    Collection Time: 10/17/23  6:09 AM   Result Value Ref Range    Sodium 132 (L) 135 - 147 mmol/L    Potassium 3.9 3.5 - 5.3 mmol/L    Chloride 94 (L) 96 - 108 mmol/L    CO2 26 21 - 32 mmol/L    ANION GAP 12 mmol/L    BUN 37 (H) 5 - 25 mg/dL    Creatinine 1.75 (H) 0.60 - 1.30 mg/dL    Glucose 370 (H) 65 - 140 mg/dL    Calcium 8.8 8.4 - 10.2 mg/dL    eGFR 42 ml/min/1.73sq m   CBC and differential    Collection Time: 10/17/23  6:09 AM   Result Value Ref Range    WBC 8.82 4.31 - 10.16 Thousand/uL    RBC 3.99 3.88 - 5.62 Million/uL    Hemoglobin 10.3 (L) 12.0 - 17.0 g/dL    Hematocrit 32.2 (L) 36.5 - 49.3 %    MCV 81 (L) 82 - 98 fL    MCH 25.8 (L) 26.8 - 34.3 pg    MCHC 32.0 31.4 - 37.4 g/dL    RDW 16.7 (H) 11.6 - 15.1 %    MPV 11.0 8.9 - 12.7 fL    Platelets 666 554 - 336 Thousands/uL    nRBC 0 /100 WBCs    Neutrophils Relative 86 (H) 43 - 75 %    Immat GRANS % 1 0 - 2 %    Lymphocytes Relative 6 (L) 14 - 44 %    Monocytes Relative 7 4 - 12 %    Eosinophils Relative 0 0 - 6 %    Basophils Relative 0 0 - 1 %    Neutrophils Absolute 7.57 1.85 - 7.62 Thousands/µL    Immature Grans Absolute 0.10 0.00 - 0.20 Thousand/uL    Lymphocytes Absolute 0.50 (L) 0.60 - 4.47 Thousands/µL    Monocytes Absolute 0.64 0.17 - 1.22 Thousand/µL    Eosinophils Absolute 0.00 0.00 - 0.61 Thousand/µL    Basophils Absolute 0.01 0.00 - 0.10 Thousands/µL   Magnesium    Collection Time: 10/17/23  6:09 AM   Result Value Ref Range    Magnesium 2.7 1.9 - 2.7 mg/dL   B-Type Natriuretic Peptide(BNP)    Collection Time: 10/17/23  6:09 AM   Result Value Ref Range     (H) 0 - 100 pg/mL   Fingerstick Glucose (POCT)    Collection Time: 10/17/23  7:57 AM   Result Value Ref Range    POC Glucose 338 (H) 65 - 140 mg/dl   Fingerstick Glucose (POCT)    Collection Time: 10/17/23 12:03 PM   Result Value Ref Range    POC Glucose 350 (H) 65 - 140 mg/dl   Fingerstick Glucose (POCT)    Collection Time: 10/17/23  5:19 PM   Result Value Ref Range    POC Glucose 417 (H) 65 - 140 mg/dl   Fingerstick Glucose (POCT)    Collection Time: 10/17/23  8:41 PM   Result Value Ref Range    POC Glucose 415 (H) 65 - 140 mg/dl   Fingerstick Glucose (POCT)    Collection Time: 10/18/23  6:21 AM   Result Value Ref Range    POC Glucose 398 (H) 65 - 140 mg/dl   Basic metabolic panel    Collection Time: 10/18/23  6:22 AM   Result Value Ref Range    Sodium 133 (L) 135 - 147 mmol/L    Potassium 3.6 3.5 - 5.3 mmol/L    Chloride 93 (L) 96 - 108 mmol/L    CO2 28 21 - 32 mmol/L    ANION GAP 12 mmol/L    BUN 50 (H) 5 - 25 mg/dL    Creatinine 2.01 (H) 0.60 - 1.30 mg/dL    Glucose 368 (H) 65 - 140 mg/dL    Calcium 8.5 8.4 - 10.2 mg/dL    eGFR 36 ml/min/1.73sq m   CBC and differential    Collection Time: 10/18/23  6:22 AM   Result Value Ref Range    WBC 11.71 (H) 4.31 - 10.16 Thousand/uL    RBC 4.08 3.88 - 5.62 Million/uL    Hemoglobin 10.4 (L) 12.0 - 17.0 g/dL    Hematocrit 33.4 (L) 36.5 - 49.3 %    MCV 82 82 - 98 fL    MCH 25.5 (L) 26.8 - 34.3 pg    MCHC 31.1 (L) 31.4 - 37.4 g/dL    RDW 16.4 (H) 11.6 - 15.1 %    MPV 11.5 8.9 - 12.7 fL    Platelets 656 241 - 124 Thousands/uL    nRBC 0 /100 WBCs    Neutrophils Relative 89 (H) 43 - 75 %    Immat GRANS % 1 0 - 2 %    Lymphocytes Relative 5 (L) 14 - 44 %    Monocytes Relative 5 4 - 12 %    Eosinophils Relative 0 0 - 6 %    Basophils Relative 0 0 - 1 %    Neutrophils Absolute 10.41 (H) 1.85 - 7.62 Thousands/µL    Immature Grans Absolute 0.11 0.00 - 0.20 Thousand/uL    Lymphocytes Absolute 0.61 0.60 - 4.47 Thousands/µL    Monocytes Absolute 0.56 0.17 - 1.22 Thousand/µL    Eosinophils Absolute 0.00 0.00 - 0.61 Thousand/µL    Basophils Absolute 0.02 0.00 - 0.10 Thousands/µL   Magnesium    Collection Time: 10/18/23  6:22 AM   Result Value Ref Range    Magnesium 2.8 (H) 1.9 - 2.7 mg/dL   Fingerstick Glucose (POCT)    Collection Time: 10/18/23  8:04 AM   Result Value Ref Range    POC Glucose 405 (H) 65 - 140 mg/dl       CT chest abdomen pelvis w contrast    Result Date: 10/16/2023  Narrative: CT CHEST, ABDOMEN AND PELVIS WITH IV CONTRAST INDICATION:   sob, generalized abdominal pain. COMPARISON: CT chest February 12, 2022. No prior CT abdomen pelvis available for comparison. TECHNIQUE: CT examination of the chest, abdomen and pelvis was performed. Multiplanar 2D reformatted images were created from the source data. This examination, like all CT scans performed in the [facility], was performed utilizing techniques to minimize radiation dose exposure, including the use of iterative reconstruction and automated exposure control. Radiation dose length product (DLP) for this visit:  1738.82 mGy-cm IV Contrast:  100 mL of iohexol (OMNIPAQUE) Enteric Contrast: Enteric contrast was not administered. FINDINGS: CHEST LUNGS: Solid 0.6 x 0.5 cm left lower lobe nodule is unchanged (series 3, image 200). No focal consolidation. No interlobular septal thickening. PLEURA:  Unremarkable. HEART/GREAT VESSELS: Heart is unremarkable for patient's age. No thoracic aortic aneurysm. CardioMEMS device within a branch of the pulmonary artery in the left lower lobe (series 6, image 83). MEDIASTINUM AND KHANG:  Unremarkable. CHEST WALL AND LOWER NECK:  Unremarkable. ABDOMEN LIVER/BILIARY TREE:  Unremarkable. GALLBLADDER:  No calcified gallstones. No pericholecystic inflammatory change. SPLEEN:  Unremarkable. PANCREAS:  Unremarkable. ADRENAL GLANDS:  Unremarkable. KIDNEYS/URETERS:  Unremarkable. No hydronephrosis. STOMACH AND BOWEL: Post Samuel-en-Y gastric bypass. Colonic diverticulosis without findings of acute diverticulitis. No abnormal luminal distention or mural thickening of the small bowel or colon. APPENDIX: A normal appendix was visualized. ABDOMINOPELVIC CAVITY:  No ascites. No pneumoperitoneum. No lymphadenopathy. VESSELS:  Unremarkable for patient's age. PELVIS REPRODUCTIVE ORGANS:  Unremarkable for patient's age. URINARY BLADDER:  Unremarkable.  ABDOMINAL WALL/INGUINAL REGIONS: Unremarkable. OSSEOUS STRUCTURES:  No acute fracture or destructive osseous lesion. Impression: No acute thoracic or abdominopelvic pathology Workstation performed: [id number]     Cardiac catheterization    Result Date: 10/11/2023  Narrative: All measurements taken at end expiration. There was significant respiratory variation. The patient was not on inotropes or MCS at the time measurements were taken. Not on O2 during case. CardioMEMS rep Taylor Rolon was present throughout the case and provided the simultaneous MEMS interrogation data. -140 during case HR 74 O2 sat 97% Hgb 10.5  RA 8 RV 30/8 PA 28/12, 17 PCWP 10-12 (multiple checks) TPG 5 PA sat 59% (multiple checks) Travis CO/CI: 6.6/2.3 TD CO/CI: 7.43/2.78 Simultaneous CardioMEMS data: Mean: 26/10/15 End expiration on MEMS waveform: 28/10   Impression/Recommendations: The patient tolerated the procedure well; no immediate complications. Normal biv filling pressures (after inpt diuresis). Normal CO/CI. CardioMEMS data correlates to [place]. XR chest 1 view portable    Result Date: 10/7/2023  Narrative: CHEST INDICATION:   sob. COMPARISON: 07/23/2023 EXAM PERFORMED/VIEWS:  XR CHEST PORTABLE Images:  1 FINDINGS: Cardiomediastinal silhouette appears unremarkable. The lungs are clear. No pneumothorax or pleural effusion. Osseous structures appear within normal limits for patient age. Impression: No acute cardiopulmonary disease. Workstation performed: [id number]       Labs and pertinent reports reviewed. This note has been generated by voice recognition software system. Therefore, there may be spelling, grammar, and or syntax errors. Please contact if questions arise.

## 2023-10-18 NOTE — PROGRESS NOTES
PULMONOLOGY PROGRESS NOTE     Name: Geeta Flaherty   Age & Sex: 66 y.o. male   MRN: [id number]  Unit/Bed#: -[facility]   Encounter: [id number]    PATIENT INFORMATION     Name: Geeta Flaherty   Age & Sex: 66 y.o. male   MRN: [id number]  Hospital Stay Days: 2    ASSESSMENT/PLAN     Assessment:   1. 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 Af on AC  3. VICKY not on Cpap  4. HFrEF s/p cardiomems implant in 2022, with recent exacerbation  5. Morbid obesity  6. CKD  7. DM2  8. Hx of gastric bypass  9. Hx of tobacco use  10. Severe eosinohphilia    Plan:  Improving of wheezing. Will change the MTP dose to QD today. Continue xopenex/atrovent QID. Upon dc will resume home meds: Symbicort and Spiriva as well as continue with Angelo Asccleveff and Singulair for his home dose. Persisted eosinophilia with responding to steroid --- previous work up including autoimmune in 2022 was unremarkable. Patient do have allergy hx and panel done in 2022 as well. Suspect his eosinophilic reaction might be in the setting of new exposure however should be work up as other underline disease. Will resend ANCA, ARACELI, RF, CCP, dsDNA, Strongyloides Ab panel. Will also suggest heme consult for further evaluation of eosinophilia  With good diuresis. Continue to keep I/O negative and noted ideal weight is around 314 lb. Continue diuresis per primary  Hyperglycemia in the setting of steroid use. Will continue to monitor per primary       SUBJECTIVE     Patient seen and examined. No acute events overnight. Less shortness of breath    OBJECTIVE     Vitals:    10/17/23 2022 10/17/23 2220 10/18/23 0600 10/18/23 0708   BP:  138/83     BP Location:       Pulse:  89  91   Resp:    18   Temp:       TempSrc:       SpO2: 97% 95%  97%   Weight:   (!) 154 kg (339 lb 9.6 oz)    Height:          Temperature:   No data recorded.     Temperature: (!) 97.3 °F (36.3 °C)  Intake & Output:  I/O         10/16 0701  10/17 0700 10/17 0701  10/18 0700 10/18 0701  10/19 0700 P.O. 600 420     IV Piggyback 50      Total Intake(mL/kg) 650 (4.1) 420 (2.7)     Urine (mL/kg/hr) 3695 2725 (0.7)     Total Output 3693 2724     Net -1874 -2941                  Weights:   IBW (Ideal Body Weight): 79.9 kg    Body mass index is 44.8 kg/m². Weight (last 2 days)       Date/Time Weight    10/18/23 0600 154 (339.6)    10/16/23 1619 157 (345.6)          Physical Exam  Constitutional:       Appearance: He is obese. He is not ill-appearing. HENT:      Head: Normocephalic. Cardiovascular:      Rate and Rhythm: Normal rate and regular rhythm. Pulmonary:      Breath sounds: No decreased breath sounds or wheezing. Abdominal:      Palpations: Abdomen is soft. Musculoskeletal:         General: Normal range of motion. Right lower leg: Edema present. Left lower leg: Edema present. Skin:     General: Skin is warm. Neurological:      General: No focal deficit present. Mental Status: He is alert and oriented to person, place, and time. LABORATORY DATA     Labs: I have personally reviewed pertinent reports.   Results from last 7 days   Lab Units 10/18/23  0622 10/17/23  0609 10/16/23  0807   WBC Thousand/uL 11.71* 8.82 9.75   HEMOGLOBIN g/dL 10.4* 10.3* 11.2*   HEMATOCRIT % 33.4* 32.2* 35.3*   PLATELETS Thousands/uL 302 270 309   NEUTROS PCT % 89* 86* 54   MONOS PCT % 5 7 8   EOS PCT % 0 0 22*      Results from last 7 days   Lab Units 10/18/23  0622 10/17/23  0609 10/16/23  0807   POTASSIUM mmol/L 3.6 3.9 3.7   CHLORIDE mmol/L 93* 94* 98   CO2 mmol/L 28 26 28   BUN mg/dL 50* 37* 23   CREATININE mg/dL 2.01* 1.75* 1.37*   CALCIUM mg/dL 8.5 8.8 8.9   ALK PHOS U/L  --   --  94   ALT U/L  --   --  83*   AST U/L  --   --  40*     Results from last 7 days   Lab Units 10/18/23  0622 10/17/23  0609   MAGNESIUM mg/dL 2.8* 2.7          Results from last 7 days   Lab Units 10/12/23  0501   INR  1.29*   PTT seconds 33                 IMAGING & DIAGNOSTIC TESTING     Radiology Results: I have personally reviewed pertinent reports. CT chest abdomen pelvis w contrast    Result Date: 10/16/2023  Impression: No acute thoracic or abdominopelvic pathology Workstation performed: [id number]     Other Diagnostic Testing: I have personally reviewed pertinent reports.     ACTIVE MEDICATIONS     Current Facility-Administered Medications   Medication Dose Route Frequency    acetaminophen (TYLENOL) tablet 650 mg  650 mg Oral Q6H PRN    apixaban (ELIQUIS) tablet 5 mg  5 mg Oral BID    atorvastatin (LIPITOR) tablet 10 mg  10 mg Oral Daily With Dinner    bumetanide (BUMEX) tablet 6 mg  6 mg Oral BID    Empagliflozin (JARDIANCE) tablet 10 mg  10 mg Oral Daily    fluticasone (FLONASE) 50 mcg/act nasal spray 1 spray  1 spray Nasal BID    hydrALAZINE (APRESOLINE) injection 5 mg  5 mg Intravenous Q6H PRN    insulin glargine (LANTUS) subcutaneous injection 8 Units 0.08 mL  8 Units Subcutaneous QAM    insulin lispro (HumaLOG) 100 units/mL subcutaneous injection 2-12 Units  2-12 Units Subcutaneous 4x Daily (AC & HS)    insulin lispro (HumaLOG) 100 units/mL subcutaneous injection 7 Units  7 Units Subcutaneous TID With Meals    ipratropium (ATROVENT) 0.02 % inhalation solution 0.5 mg  0.5 mg Nebulization 4x Daily    levalbuterol (XOPENEX) inhalation solution 1.25 mg  1.25 mg Nebulization Q6H PRN    levalbuterol (XOPENEX) inhalation solution 1.25 mg  1.25 mg Nebulization 4x Daily    loratadine (CLARITIN) tablet 10 mg  10 mg Oral Daily    melatonin tablet 3 mg  3 mg Oral HS    methylPREDNISolone sodium succinate (Solu-MEDROL) injection 40 mg  40 mg Intravenous Q8H ANA    metoprolol succinate (TOPROL-XL) 24 hr tablet 50 mg  50 mg Oral Daily    ondansetron (ZOFRAN) injection 4 mg  4 mg Intravenous Q4H PRN    pantoprazole (PROTONIX) EC tablet 40 mg  40 mg Oral Daily    pregabalin (LYRICA) capsule 50 mg  50 mg Oral HS    spironolactone (ALDACTONE) tablet 25 mg  25 mg Oral Daily         Disclaimer: Portions of the record may have been created with voice recognition software. Occasional wrong word or "sound a like" substitutions may have occurred due to the inherent limitations of voice recognition software. Careful consideration should be taken to recognize, using context, where substitutions have occurred.     Stephanie Coburn MD   Pulmonary and Critical Care Fellow, PGY-IV  [facility]

## 2023-10-19 ENCOUNTER — RA CDI HCC (OUTPATIENT)
Dept: OTHER | Facility: HOSPITAL | Age: 58
End: 2023-10-19

## 2023-10-19 PROBLEM — E87.1 HYPONATREMIA: Status: RESOLVED | Noted: 2023-10-17 | Resolved: 2023-10-19

## 2023-10-19 LAB
ANA SER QL IA: NEGATIVE
ANION GAP SERPL CALCULATED.3IONS-SCNC: 11 MMOL/L
BASOPHILS # BLD AUTO: 0.03 THOUSANDS/ÂΜL (ref 0–0.1)
BASOPHILS NFR BLD AUTO: 0 % (ref 0–1)
BUN SERPL-MCNC: 54 MG/DL (ref 5–25)
CALCIUM SERPL-MCNC: 8.7 MG/DL (ref 8.4–10.2)
CHLORIDE SERPL-SCNC: 96 MMOL/L (ref 96–108)
CO2 SERPL-SCNC: 32 MMOL/L (ref 21–32)
CREAT SERPL-MCNC: 2 MG/DL (ref 0.6–1.3)
EOSINOPHIL # BLD AUTO: 0.16 THOUSAND/ÂΜL (ref 0–0.61)
EOSINOPHIL NFR BLD AUTO: 1 % (ref 0–6)
ERYTHROCYTE [DISTWIDTH] IN BLOOD BY AUTOMATED COUNT: 16.4 % (ref 11.6–15.1)
GFR SERPL CREATININE-BSD FRML MDRD: 36 ML/MIN/1.73SQ M
GLUCOSE SERPL-MCNC: 128 MG/DL (ref 65–140)
GLUCOSE SERPL-MCNC: 206 MG/DL (ref 65–140)
GLUCOSE SERPL-MCNC: 271 MG/DL (ref 65–140)
GLUCOSE SERPL-MCNC: 272 MG/DL (ref 65–140)
GLUCOSE SERPL-MCNC: 275 MG/DL (ref 65–140)
HCT VFR BLD AUTO: 35.2 % (ref 36.5–49.3)
HGB BLD-MCNC: 10.9 G/DL (ref 12–17)
IMM GRANULOCYTES # BLD AUTO: 0.08 THOUSAND/UL (ref 0–0.2)
IMM GRANULOCYTES NFR BLD AUTO: 1 % (ref 0–2)
LYMPHOCYTES # BLD AUTO: 1.52 THOUSANDS/ÂΜL (ref 0.6–4.47)
LYMPHOCYTES NFR BLD AUTO: 12 % (ref 14–44)
MAGNESIUM SERPL-MCNC: 2.7 MG/DL (ref 1.9–2.7)
MCH RBC QN AUTO: 25.3 PG (ref 26.8–34.3)
MCHC RBC AUTO-ENTMCNC: 31 G/DL (ref 31.4–37.4)
MCV RBC AUTO: 82 FL (ref 82–98)
MONOCYTES # BLD AUTO: 0.73 THOUSAND/ÂΜL (ref 0.17–1.22)
MONOCYTES NFR BLD AUTO: 6 % (ref 4–12)
NEUTROPHILS # BLD AUTO: 9.78 THOUSANDS/ÂΜL (ref 1.85–7.62)
NEUTS SEG NFR BLD AUTO: 80 % (ref 43–75)
NRBC BLD AUTO-RTO: 0 /100 WBCS
PLATELET # BLD AUTO: 311 THOUSANDS/UL (ref 149–390)
PMV BLD AUTO: 11 FL (ref 8.9–12.7)
POTASSIUM SERPL-SCNC: 2.9 MMOL/L (ref 3.5–5.3)
RBC # BLD AUTO: 4.3 MILLION/UL (ref 3.88–5.62)
RHEUMATOID FACT SER QL LA: NEGATIVE
SODIUM SERPL-SCNC: 139 MMOL/L (ref 135–147)
WBC # BLD AUTO: 12.3 THOUSAND/UL (ref 4.31–10.16)

## 2023-10-19 PROCEDURE — 86225 DNA ANTIBODY NATIVE: CPT | Performed by: STUDENT IN AN ORGANIZED HEALTH CARE EDUCATION/TRAINING PROGRAM

## 2023-10-19 PROCEDURE — 99232 SBSQ HOSP IP/OBS MODERATE 35: CPT | Performed by: INTERNAL MEDICINE

## 2023-10-19 PROCEDURE — 94664 DEMO&/EVAL PT USE INHALER: CPT

## 2023-10-19 PROCEDURE — 86682 HELMINTH ANTIBODY: CPT | Performed by: STUDENT IN AN ORGANIZED HEALTH CARE EDUCATION/TRAINING PROGRAM

## 2023-10-19 PROCEDURE — 82948 REAGENT STRIP/BLOOD GLUCOSE: CPT

## 2023-10-19 PROCEDURE — 83520 IMMUNOASSAY QUANT NOS NONAB: CPT | Performed by: STUDENT IN AN ORGANIZED HEALTH CARE EDUCATION/TRAINING PROGRAM

## 2023-10-19 PROCEDURE — 85025 COMPLETE CBC W/AUTO DIFF WBC: CPT | Performed by: INTERNAL MEDICINE

## 2023-10-19 PROCEDURE — 86037 ANCA TITER EACH ANTIBODY: CPT | Performed by: STUDENT IN AN ORGANIZED HEALTH CARE EDUCATION/TRAINING PROGRAM

## 2023-10-19 PROCEDURE — 87070 CULTURE OTHR SPECIMN AEROBIC: CPT | Performed by: INTERNAL MEDICINE

## 2023-10-19 PROCEDURE — 80048 BASIC METABOLIC PNL TOTAL CA: CPT | Performed by: INTERNAL MEDICINE

## 2023-10-19 PROCEDURE — 94760 N-INVAS EAR/PLS OXIMETRY 1: CPT

## 2023-10-19 PROCEDURE — 86003 ALLG SPEC IGE CRUDE XTRC EA: CPT | Performed by: STUDENT IN AN ORGANIZED HEALTH CARE EDUCATION/TRAINING PROGRAM

## 2023-10-19 PROCEDURE — 86430 RHEUMATOID FACTOR TEST QUAL: CPT | Performed by: STUDENT IN AN ORGANIZED HEALTH CARE EDUCATION/TRAINING PROGRAM

## 2023-10-19 PROCEDURE — 99232 SBSQ HOSP IP/OBS MODERATE 35: CPT | Performed by: STUDENT IN AN ORGANIZED HEALTH CARE EDUCATION/TRAINING PROGRAM

## 2023-10-19 PROCEDURE — 86200 CCP ANTIBODY: CPT | Performed by: STUDENT IN AN ORGANIZED HEALTH CARE EDUCATION/TRAINING PROGRAM

## 2023-10-19 PROCEDURE — 87205 SMEAR GRAM STAIN: CPT | Performed by: INTERNAL MEDICINE

## 2023-10-19 PROCEDURE — 82785 ASSAY OF IGE: CPT | Performed by: STUDENT IN AN ORGANIZED HEALTH CARE EDUCATION/TRAINING PROGRAM

## 2023-10-19 PROCEDURE — 86038 ANTINUCLEAR ANTIBODIES: CPT | Performed by: STUDENT IN AN ORGANIZED HEALTH CARE EDUCATION/TRAINING PROGRAM

## 2023-10-19 PROCEDURE — 94640 AIRWAY INHALATION TREATMENT: CPT

## 2023-10-19 PROCEDURE — 83735 ASSAY OF MAGNESIUM: CPT | Performed by: INTERNAL MEDICINE

## 2023-10-19 RX ORDER — PREDNISONE 10 MG/1
10 TABLET ORAL DAILY
Status: DISCONTINUED | OUTPATIENT
Start: 2023-10-25 | End: 2023-10-20 | Stop reason: HOSPADM

## 2023-10-19 RX ORDER — LEVALBUTEROL INHALATION SOLUTION 1.25 MG/3ML
1.25 SOLUTION RESPIRATORY (INHALATION)
Status: DISCONTINUED | OUTPATIENT
Start: 2023-10-19 | End: 2023-10-20 | Stop reason: HOSPADM

## 2023-10-19 RX ORDER — POTASSIUM CHLORIDE 20 MEQ/1
40 TABLET, EXTENDED RELEASE ORAL ONCE
Status: COMPLETED | OUTPATIENT
Start: 2023-10-19 | End: 2023-10-19

## 2023-10-19 RX ORDER — POTASSIUM CHLORIDE 20 MEQ/1
20 TABLET, EXTENDED RELEASE ORAL ONCE
Status: COMPLETED | OUTPATIENT
Start: 2023-10-19 | End: 2023-10-19

## 2023-10-19 RX ORDER — PREDNISONE 20 MG/1
20 TABLET ORAL DAILY
Status: DISCONTINUED | OUTPATIENT
Start: 2023-10-22 | End: 2023-10-20 | Stop reason: HOSPADM

## 2023-10-19 RX ORDER — INSULIN LISPRO 100 [IU]/ML
8 INJECTION, SOLUTION INTRAVENOUS; SUBCUTANEOUS
Status: DISCONTINUED | OUTPATIENT
Start: 2023-10-19 | End: 2023-10-20 | Stop reason: HOSPADM

## 2023-10-19 RX ORDER — PREDNISONE 20 MG/1
40 TABLET ORAL DAILY
Status: DISCONTINUED | OUTPATIENT
Start: 2023-10-20 | End: 2023-10-20 | Stop reason: HOSPADM

## 2023-10-19 RX ADMIN — INSULIN LISPRO 4 UNITS: 100 INJECTION, SOLUTION INTRAVENOUS; SUBCUTANEOUS at 09:00

## 2023-10-19 RX ADMIN — BUMETANIDE 6 MG: 2 TABLET ORAL at 09:38

## 2023-10-19 RX ADMIN — FLUTICASONE PROPIONATE 1 SPRAY: 50 SPRAY, METERED NASAL at 09:31

## 2023-10-19 RX ADMIN — FLUTICASONE PROPIONATE 1 SPRAY: 50 SPRAY, METERED NASAL at 17:47

## 2023-10-19 RX ADMIN — INSULIN LISPRO 8 UNITS: 100 INJECTION, SOLUTION INTRAVENOUS; SUBCUTANEOUS at 12:42

## 2023-10-19 RX ADMIN — METOPROLOL SUCCINATE 50 MG: 50 TABLET, EXTENDED RELEASE ORAL at 09:29

## 2023-10-19 RX ADMIN — LEVALBUTEROL HYDROCHLORIDE 1.25 MG: 1.25 SOLUTION RESPIRATORY (INHALATION) at 07:09

## 2023-10-19 RX ADMIN — LORATADINE 10 MG: 10 TABLET ORAL at 09:30

## 2023-10-19 RX ADMIN — LEVALBUTEROL HYDROCHLORIDE 1.25 MG: 1.25 SOLUTION RESPIRATORY (INHALATION) at 21:31

## 2023-10-19 RX ADMIN — POTASSIUM CHLORIDE 20 MEQ: 1500 TABLET, EXTENDED RELEASE ORAL at 09:30

## 2023-10-19 RX ADMIN — POTASSIUM CHLORIDE 40 MEQ: 1500 TABLET, EXTENDED RELEASE ORAL at 09:29

## 2023-10-19 RX ADMIN — APIXABAN 5 MG: 5 TABLET, FILM COATED ORAL at 09:41

## 2023-10-19 RX ADMIN — INSULIN LISPRO 6 UNITS: 100 INJECTION, SOLUTION INTRAVENOUS; SUBCUTANEOUS at 21:47

## 2023-10-19 RX ADMIN — IPRATROPIUM BROMIDE 0.5 MG: 0.5 SOLUTION RESPIRATORY (INHALATION) at 14:26

## 2023-10-19 RX ADMIN — Medication 3 MG: at 21:45

## 2023-10-19 RX ADMIN — IPRATROPIUM BROMIDE 0.5 MG: 0.5 SOLUTION RESPIRATORY (INHALATION) at 21:31

## 2023-10-19 RX ADMIN — INSULIN LISPRO 12 UNITS: 100 INJECTION, SOLUTION INTRAVENOUS; SUBCUTANEOUS at 09:32

## 2023-10-19 RX ADMIN — INSULIN GLARGINE 14 UNITS: 100 INJECTION, SOLUTION SUBCUTANEOUS at 09:36

## 2023-10-19 RX ADMIN — BUMETANIDE 6 MG: 2 TABLET ORAL at 17:47

## 2023-10-19 RX ADMIN — PANTOPRAZOLE SODIUM 40 MG: 40 TABLET, DELAYED RELEASE ORAL at 09:30

## 2023-10-19 RX ADMIN — IPRATROPIUM BROMIDE 0.5 MG: 0.5 SOLUTION RESPIRATORY (INHALATION) at 07:09

## 2023-10-19 RX ADMIN — INSULIN LISPRO 6 UNITS: 100 INJECTION, SOLUTION INTRAVENOUS; SUBCUTANEOUS at 17:47

## 2023-10-19 RX ADMIN — APIXABAN 5 MG: 5 TABLET, FILM COATED ORAL at 17:47

## 2023-10-19 RX ADMIN — EMPAGLIFLOZIN 10 MG: 10 TABLET, FILM COATED ORAL at 09:38

## 2023-10-19 RX ADMIN — INSULIN LISPRO 6 UNITS: 100 INJECTION, SOLUTION INTRAVENOUS; SUBCUTANEOUS at 12:41

## 2023-10-19 RX ADMIN — SPIRONOLACTONE 25 MG: 25 TABLET ORAL at 09:30

## 2023-10-19 RX ADMIN — INSULIN LISPRO 8 UNITS: 100 INJECTION, SOLUTION INTRAVENOUS; SUBCUTANEOUS at 17:47

## 2023-10-19 RX ADMIN — PREGABALIN 50 MG: 50 CAPSULE ORAL at 21:45

## 2023-10-19 RX ADMIN — ATORVASTATIN CALCIUM 10 MG: 10 TABLET, FILM COATED ORAL at 17:47

## 2023-10-19 RX ADMIN — LEVALBUTEROL HYDROCHLORIDE 1.25 MG: 1.25 SOLUTION RESPIRATORY (INHALATION) at 14:27

## 2023-10-19 RX ADMIN — PREDNISONE 40 MG: 20 TABLET ORAL at 09:29

## 2023-10-19 NOTE — ASSESSMENT & PLAN NOTE
Recent hospital admission w/ discharge earlier this month on 10/12/23 noted for CHF exacerbation  Last EF of 50% in April 2023 - s/p CardioMEMS placement in March 2023  BNP minimally elevated however weights down from prior admission, no reported edema  Follows outpatient cardiology  Continue Bumex/Aldactone for diuresis and Toprol-XL for beta-blockade  Low-sodium/fluid restriction

## 2023-10-19 NOTE — ASSESSMENT & PLAN NOTE
Lab Results   Component Value Date    HGBA1C 7.1 (A) 08/09/2023     A1C 7.1, only maintained on orals at home--Januvia held while hospitalized - continued Jardiance in the setting of known heart failure  With steroid induced hypoglycemia, added basal/bolus insulin.  Now that we are titrating steroids down can decrease basal/bolus regimen  Carbohydrate restriction  Hypoglycemia protocol

## 2023-10-19 NOTE — QUICK NOTE
Please refer to the note from yesterday by Dr. Yakelin Valles with Dr. Ruchi Levi for more details    No further hematology work-up or management indicated, occasional mild eosinophilia is consistent with his allergic/eosinophilic asthma diagnosis and there is no concern of underlying hematological malignancy. Patient to continue follow-up with his PCP outpatient    Medical oncology will sign off please feel free to reach with any question or concern.     Blaine Peña, DO   Hematology and Medical Oncology - PGY [facility]

## 2023-10-19 NOTE — ASSESSMENT & PLAN NOTE
Persistently worsening shortness of breath with wheezing over the last several days acutely worsening over the last 24 hours (was recently admitted with hospital discharge on 10/12/23). Patient on triple therapy(LAMA/LABA/ICS) and recently started Pomona Neas (last injection 2 weeks PTA)  Started on IV solumedrol TID on admission, was transitioned to PO Prednisone as of 10/19 x 3 days  Continue inhaled corticosteroid and Xopenex/Atrovent nebulization.    [facility]  [facility] of chest negative for acute etiology/changes  Reports worsening cough on 10/19, will check sputum culture and procal. Afebrile so doubt need for abx at this time  Pulmonology consult appreciated, agree with above  Recommended hematology consult given significant eosinophilia--see below

## 2023-10-19 NOTE — PROGRESS NOTES
PULMONOLOGY PROGRESS NOTE     Name: Bridgette Blue   Age & Sex: 66 y.o. male   MRN: [id number]  Unit/Bed#: -01   Encounter: [id number]    PATIENT INFORMATION     Name: Bridgette Blue   Age & Sex: 66 y.o. male   MRN: [id number]  Hospital Stay Days: 3    ASSESSMENT/PLAN     Assessment:   1. 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 Af on AC  3. VICKY not on Cpap  4. HFrEF s/p cardiomems implant in , with recent exacerbation  5. Morbid obesity  6. CKD  7. DM2  8. Hx of gastric bypass  9. Hx of tobacco use  10. Severe eosinohphilia    Plan:  Improving of wheezing. Will treat with prednisone taper. Continue xopenex/atrovent QID in house. Upon dc will resume home meds: Symbicort and Spiriva as well as continue with Clorinda Lang and Singulair for his home dose. Persisted eosinophilia with responding to steroid --- previous work up including autoimmune in  was unremarkable. Patient do have allergy hx and panel done in  as well. Suspect his eosinophilic reaction might be in the setting of new exposure however should be work up as other underline disease. Resend ANCA, ARACELI, RF, CCP, dsDNA, Strongyloides Ab, aspergillous IgE panel to r/o other cause of eosinophilia    ARACELI, RF normal. Other in process  With good diuresis. Continue to keep I/O negative and noted ideal weight is around 314 lb. Continue diuresis per primary  Hyperglycemia in the setting of steroid use. Will continue to monitor per primary   Patient is cleared to discharge from pulmonary standpoint. He will follow up at our office      SUBJECTIVE     Patient seen and examined. Still with some cough when ambulating last night. No fever.  On room air    OBJECTIVE     Vitals:    10/18/23 1210 10/18/23 1538 10/18/23 2026 10/18/23 2314   BP:    126/70   BP Location:    Left arm   Pulse:    83   Resp:    18   Temp:       TempSrc:       SpO2: 94% 97% 97% 96%   Weight:       Height:          Temperature:   Temp (24hrs), Av °F (35.6 °C), Min:96 °F (35.6 °C), Max:96 °F (35.6 °C)    Temperature: (!) 96 °F (35.6 °C)  Intake & Output:  I/O         10/16 0701  10/17 0700 10/17 0701  10/18 0700 10/18 0701  10/19 0700    P. O. 600 420     IV Piggyback 50      Total Intake(mL/kg) 650 (4.1) 420 (2.7)     Urine (mL/kg/hr) 3695 2725 (0.7)     Total Output 3695 2725     Net -3045 -2306                  Weights:   IBW (Ideal Body Weight): 79.9 kg    Body mass index is 44.8 kg/m². Weight (last 2 days)       Date/Time Weight    10/18/23 0600 154 (339.6)          Physical Exam  Constitutional:       Appearance: He is obese. He is not ill-appearing. HENT:      Head: Normocephalic. Cardiovascular:      Rate and Rhythm: Normal rate and regular rhythm. Pulmonary:      Breath sounds: No decreased breath sounds or wheezing. Abdominal:      Palpations: Abdomen is soft. Musculoskeletal:         General: Normal range of motion. Right lower leg: Edema present. Left lower leg: Edema present. Skin:     General: Skin is warm. Neurological:      General: No focal deficit present. Mental Status: He is alert and oriented to person, place, and time. LABORATORY DATA     Labs: I have personally reviewed pertinent reports.   Results from last 7 days   Lab Units 10/18/23  0622 10/17/23  0609 10/16/23  0807   WBC Thousand/uL 11.71* 8.82 9.75   HEMOGLOBIN g/dL 10.4* 10.3* 11.2*   HEMATOCRIT % 33.4* 32.2* 35.3*   PLATELETS Thousands/uL 302 270 309   NEUTROS PCT % 89* 86* 54   MONOS PCT % 5 7 8   EOS PCT % 0 0 22*        Results from last 7 days   Lab Units 10/18/23  0622 10/17/23  0609 10/16/23  0807   POTASSIUM mmol/L 3.6 3.9 3.7   CHLORIDE mmol/L 93* 94* 98   CO2 mmol/L 28 26 28   BUN mg/dL 50* 37* 23   CREATININE mg/dL 2.01* 1.75* 1.37*   CALCIUM mg/dL 8.5 8.8 8.9   ALK PHOS U/L  --   --  94   ALT U/L  --   --  83*   AST U/L  --   --  40*       Results from last 7 days   Lab Units 10/18/23  0622 10/17/23  0609   MAGNESIUM mg/dL 2.8* 2.7 IMAGING & DIAGNOSTIC TESTING     Radiology Results: I have personally reviewed pertinent reports. CT chest abdomen pelvis w contrast    Result Date: 10/16/2023  Impression: No acute thoracic or abdominopelvic pathology Workstation performed: [id number]     Other Diagnostic Testing: I have personally reviewed pertinent reports.     ACTIVE MEDICATIONS     Current Facility-Administered Medications   Medication Dose Route Frequency    acetaminophen (TYLENOL) tablet 650 mg  650 mg Oral Q6H PRN    apixaban (ELIQUIS) tablet 5 mg  5 mg Oral BID    atorvastatin (LIPITOR) tablet 10 mg  10 mg Oral Daily With Dinner    bumetanide (BUMEX) tablet 6 mg  6 mg Oral BID    Empagliflozin (JARDIANCE) tablet 10 mg  10 mg Oral Daily    fluticasone (FLONASE) 50 mcg/act nasal spray 1 spray  1 spray Nasal BID    hydrALAZINE (APRESOLINE) injection 5 mg  5 mg Intravenous Q6H PRN    insulin glargine (LANTUS) subcutaneous injection 14 Units 0.14 mL  14 Units Subcutaneous QAM    insulin lispro (HumaLOG) 100 units/mL subcutaneous injection 12 Units  12 Units Subcutaneous TID With Meals    insulin lispro (HumaLOG) 100 units/mL subcutaneous injection 2-12 Units  2-12 Units Subcutaneous 4x Daily (AC & HS)    ipratropium (ATROVENT) 0.02 % inhalation solution 0.5 mg  0.5 mg Nebulization 4x Daily    levalbuterol (XOPENEX) inhalation solution 1.25 mg  1.25 mg Nebulization Q6H PRN    levalbuterol (XOPENEX) inhalation solution 1.25 mg  1.25 mg Nebulization 4x Daily    loratadine (CLARITIN) tablet 10 mg  10 mg Oral Daily    melatonin tablet 3 mg  3 mg Oral HS    metoprolol succinate (TOPROL-XL) 24 hr tablet 50 mg  50 mg Oral Daily    ondansetron (ZOFRAN) injection 4 mg  4 mg Intravenous Q4H PRN    pantoprazole (PROTONIX) EC tablet 40 mg  40 mg Oral Daily    predniSONE tablet 40 mg  40 mg Oral Daily    pregabalin (LYRICA) capsule 50 mg  50 mg Oral HS    spironolactone (ALDACTONE) tablet 25 mg  25 mg Oral Daily         Disclaimer: Portions of the record may have been created with voice recognition software. Occasional wrong word or "sound a like" substitutions may have occurred due to the inherent limitations of voice recognition software. Careful consideration should be taken to recognize, using context, where substitutions have occurred.     Betina Harkins MD   Pulmonary and Critical Care Fellow, PGY-IV  [facility]

## 2023-10-19 NOTE — UTILIZATION REVIEW
Continued Stay Review    Date: 10/19/23                          Current Patient Class: Inpatient  Current Level of Care: med surg    HPI:58 y.o. male initially admitted on 10/16   with Exacerbation  Asthma     10/18  Oncology/hematology consult  Has  H/O  eosinophilia  for several years. No clear  contributors noted. No hematology   work up needed, follow  OP. Assessment/Plan:   Transitioned to prednisone  10/18. Continue nebulizers. Productive cough worse during the night and this  am. Feels  SOB and wheezy with exertion. Sats adequate  RA. Not feeling well enough for  d/c.       Vital Signs: 97.7-92-20       128/71     sats  96  % RA    Pertinent Labs/Diagnostic Results:       Results from last 7 days   Lab Units 10/19/23  0511 10/18/23  0622 10/17/23  0609 10/16/23  0807   WBC Thousand/uL 12.30* 11.71* 8.82 9.75   HEMOGLOBIN g/dL 10.9* 10.4* 10.3* 11.2*   HEMATOCRIT % 35.2* 33.4* 32.2* 35.3*   PLATELETS Thousands/uL 311 302 270 309   NEUTROS ABS Thousands/µL 9.78* 10.41* 7.57 5.46         Results from last 7 days   Lab Units 10/19/23  0511 10/18/23  0622 10/17/23  0609 10/16/23  0807   SODIUM mmol/L 139 133* 132* 137   POTASSIUM mmol/L 2.9* 3.6 3.9 3.7   CHLORIDE mmol/L 96 93* 94* 98   CO2 mmol/L 32 28 26 28   ANION GAP mmol/L 11 12 12 11   BUN mg/dL 54* 50* 37* 23   CREATININE mg/dL 2.00* 2.01* 1.75* 1.37*   EGFR ml/min/1.73sq m 36 36 42 57   CALCIUM mg/dL 8.7 8.5 8.8 8.9   MAGNESIUM mg/dL 2.7 2.8* 2.7  --        Results from last 7 days   Lab Units 10/19/23  0616 10/18/23  2057 10/18/23  1713 10/18/23  1146 10/18/23  0804 10/18/23  0621 10/17/23  2041 10/17/23  1719 10/17/23  1203 10/17/23  0757 10/17/23  0528 10/16/23  2153   POC GLUCOSE mg/dl 206* 296* 356* 400* 405* 398* 415* 417* 350* 338* 373* 486*     Results from last 7 days   Lab Units 10/19/23  0511 10/18/23  0622 10/17/23  0609 10/16/23  0807   GLUCOSE RANDOM mg/dL 128 368* 370* 252*                       Results from last 7 days   Lab Units 10/17/23  0609   BNP pg/mL 138*                     Results from last 7 days   Lab Units 10/16/23  0807   LIPASE u/L 66             Medications:   Scheduled Medications:  apixaban, 5 mg, Oral, BID  atorvastatin, 10 mg, Oral, Daily With Dinner  bumetanide, 6 mg, Oral, BID  Empagliflozin, 10 mg, Oral, Daily  fluticasone, 1 spray, Nasal, BID  insulin glargine, 14 Units, Subcutaneous, QAM  insulin lispro, 2-12 Units, Subcutaneous, 4x Daily (AC & HS)  insulin lispro, 8 Units, Subcutaneous, TID With Meals  ipratropium, 0.5 mg, Nebulization, 4x Daily  levalbuterol, 1.25 mg, Nebulization, 4x Daily  loratadine, 10 mg, Oral, Daily  melatonin, 3 mg, Oral, HS  metoprolol succinate, 50 mg, Oral, Daily  pantoprazole, 40 mg, Oral, Daily  predniSONE, 40 mg, Oral, Daily  pregabalin, 50 mg, Oral, HS  spironolactone, 25 mg, Oral, Daily      Continuous IV Infusions:     PRN Meds:  acetaminophen, 650 mg, Oral, Q6H PRN  hydrALAZINE, 5 mg, Intravenous, Q6H PRN  levalbuterol, 1.25 mg, Nebulization, Q6H PRN  ondansetron, 4 mg, Intravenous, Q4H PRN        Discharge Plan: D    Network Utilization Review Department  ATTENTION: Please call with any questions or concerns to [phone] and carefully listen to the prompts so that you are directed to the right person. All voicemails are confidential.   For Discharge needs, contact Care Management DC Support Team at [phone] opt. 2  Send all requests for admission clinical reviews, approved or denied determinations and any other requests to dedicated fax number below belonging to the campus where the patient is receiving treatment.  List of dedicated fax numbers for the Facilities:  [place] DENIALS (Administrative/Medical Necessity) [phone]   DISCHARGE SUPPORT TEAM (NETWORK) [id number] [address]d (Maternity/NICU/Pediatrics) [address] [place] [address] [address] [address] [facility] [address] [phone]

## 2023-10-19 NOTE — ASSESSMENT & PLAN NOTE
Prior baseline was approx 1.3-1.5 -> however after last hospital discharge, HF anticipates new baseline around 1.7 or so. Presented slightly below baseline at 1.37. Has been worsening however patient appears euvolemic and weights are stable. May need to tolerate higher baseline to maintain euvolemia.    Continue to monitor BMP

## 2023-10-19 NOTE — PROGRESS NOTES
[facility]  Progress Note  Name: Matt Cano  MRN: [id number]  Unit/Bed#: -01 I Date of Admission: 10/16/2023   Date of Service: 10/19/2023 I Hospital Day: 3    Assessment/Plan   * Asthma exacerbation  Assessment & Plan  Persistently worsening shortness of breath with wheezing over the last several days acutely worsening over the last 24 hours (was recently admitted with hospital discharge on 10/12/23). Patient on triple therapy(LAMA/LABA/ICS) and recently started Girtha Gonzales (last injection 2 weeks PTA)  Started on IV solumedrol TID on admission, was transitioned to PO Prednisone as of 10/19 x 3 days  Continue inhaled corticosteroid and Xopenex/Atrovent nebulization.    On discharge will go back to Symbicort/Spiriva and Fasenra  CT of chest negative for acute etiology/changes  Reports worsening cough on 10/19, will check sputum culture and procal. Afebrile so doubt need for abx at this time  Pulmonology consult appreciated, agree with above  Recommended hematology consult given significant eosinophilia--see below    Eosinophilia  Assessment & Plan  ?related to asthma/atopy however pulmonology recommended consultation with hematology for further workup  No further hematology work-up or management indicated, occasional mild eosinophilia is consistent with his allergic/eosinophilic asthma diagnosis and there is no concern of underlying hematological malignancy per heme onc  Can f/u additional specialty labs sent out as outpatient     Chronic diastolic CHF [facility])  Assessment & Plan  Recent hospital admission w/ discharge earlier this month on 10/12/23 noted for CHF exacerbation  Last EF of 50% in April 2023 - s/p CardioMEMS placement in March 2023  BNP minimally elevated however weights down from prior admission, no reported edema  Follows outpatient cardiology  Continue Bumex/Aldactone for diuresis and Toprol-XL for beta-blockade  Low-sodium/fluid restriction    CKD (chronic kidney disease) stage 3 (HCC)  Assessment & Plan  Prior baseline was approx 1.3-1.5 -> however after last hospital discharge, HF anticipates new baseline around 1.7 or so. Presented slightly below baseline at 1.37. Has been worsening however patient appears euvolemic and weights are stable. May need to tolerate higher baseline to maintain euvolemia. Continue to monitor BMP    Anemia of chronic disease  Assessment & Plan  Stable at baseline between 9-11  Monitor H/H    Paroxysmal atrial fibrillation (HCC)  Assessment & Plan  Rate controlled on Toprol-XL  On Eliquis for anticoagulation    Morbid obesity ([address])  Assessment & Plan  BMI of 44.91  S/p prior gastric bypass   Lifestyle/diet modifications    Hyperlipidemia  Assessment & Plan  Continue statin    Type 2 diabetes mellitus with diabetic polyneuropathy ([facility])  Assessment & Plan  Lab Results   Component Value Date    HGBA1C 7.1 (A) 08/09/2023     A1C 7.1, only maintained on orals at home--Januvia held while hospitalized - continued Jardiance in the setting of known heart failure  With steroid induced hypoglycemia, added basal/bolus insulin. Now that we are titrating steroids down can decrease basal/bolus regimen  Carbohydrate restriction  Hypoglycemia protocol    Primary hypertension  Assessment & Plan  Continue Aldactone/Toprol-XL/Bumex  Low-sodium diet    Hyponatremia-resolved as of 10/19/2023  Assessment & Plan  Corrected for normal when glucose factored in  Recommend ongoing glucose control   Monitor BMP             VTE Pharmacologic Prophylaxis:   Moderate Risk (Score 3-4) - Pharmacological DVT Prophylaxis Ordered: apixaban (Eliquis). Patient Centered Rounds: I performed bedside rounds with nursing staff today. Discussions with Specialists or Other Care Team Provider: appreciate pulm and hematology input. D/w CM    Education and Discussions with Family / Patient: Patient declined call to .      Total Time Spent on Date of Encounter in care of patient: 34 mins. This time was spent on one or more of the following: performing physical exam; counseling and coordination of care; obtaining or reviewing history; documenting in the medical record; reviewing/ordering tests, medications or procedures; communicating with other healthcare professionals and discussing with patient's family/caregivers. Current Length of Stay: 3 day(s)  Current Patient Status: Inpatient   Certification Statement: The patient will continue to require additional inpatient hospital stay due to creat monitoring, increased cough  Discharge Plan: Anticipate discharge tomorrow to home. Code Status: Level 1 - Full Code    Subjective:   Pt states he felt a little worse last night and again this AM as he is coughing up a lot of green/yellow mucous. He felt SOB and wheezy with exertion. Legs are not swollen and does not feel like he is retaining fluid. Does not quite feel well enough to leave today. Objective:     Vitals:   Temp (24hrs), Av.7 °F (36.5 °C), Min:97.7 °F (36.5 °C), Max:97.7 °F (36.5 °C)    Temp:  [97.7 °F (36.5 °C)] 97.7 °F (36.5 °C)  HR:  [83-92] 92  Resp:  [18-20] 20  BP: (126-128)/(70-71) 128/71  SpO2:  [94 %-99 %] 96 %  Body mass index is 44.86 kg/m². Input and Output Summary (last 24 hours): Intake/Output Summary (Last 24 hours) at 10/19/2023 0953  Last data filed at 10/19/2023 0747  Gross per 24 hour   Intake 580 ml   Output 3250 ml   Net -2670 ml       Physical Exam:   Physical Exam  Vitals and nursing note reviewed. Constitutional:       General: He is not in acute distress. Appearance: He is obese. Comments: On RA    Cardiovascular:      Rate and Rhythm: Normal rate. Pulmonary:      Effort: No respiratory distress. Breath sounds: No wheezing. Abdominal:      General: There is no distension. Tenderness: There is no abdominal tenderness. Musculoskeletal:      Right lower leg: No edema. Left lower leg: No edema. Neurological:      Mental Status: He is oriented to person, place, and time. Psychiatric:         Mood and Affect: Mood normal.          Additional Data:     Labs:  Results from last 7 days   Lab Units 10/19/23  0511   WBC Thousand/uL 12.30*   HEMOGLOBIN g/dL 10.9*   HEMATOCRIT % 35.2*   PLATELETS Thousands/uL 311   NEUTROS PCT % 80*   LYMPHS PCT % 12*   MONOS PCT % 6   EOS PCT % 1     Results from last 7 days   Lab Units 10/19/23  0511 10/17/23  0609 10/16/23  0807   SODIUM mmol/L 139   < > 137   POTASSIUM mmol/L 2.9*   < > 3.7   CHLORIDE mmol/L 96   < > 98   CO2 mmol/L 32   < > 28   BUN mg/dL 54*   < > 23   CREATININE mg/dL 2.00*   < > 1.37*   ANION GAP mmol/L 11   < > 11   CALCIUM mg/dL 8.7   < > 8.9   ALBUMIN g/dL  --   --  3.6   TOTAL BILIRUBIN mg/dL  --   --  0.70   ALK PHOS U/L  --   --  94   ALT U/L  --   --  83*   AST U/L  --   --  40*   GLUCOSE RANDOM mg/dL 128   < > 252*    < > = values in this interval not displayed. Results from last 7 days   Lab Units 10/19/23  0616 10/18/23  2057 10/18/23  1713 10/18/23  1146 10/18/23  0804 10/18/23  2203 10/17/23  2041 10/17/23  1719 10/17/23  1203 10/17/23  0757 10/17/23  0528 10/16/23  2153   POC GLUCOSE mg/dl 206* 296* 356* 400* 405* 398* 415* 417* 350* 338* 373* 486*               Lines/Drains:  Invasive Devices       Peripheral Intravenous Line  Duration             Peripheral IV 10/16/23 Right Antecubital 3 days                          Imaging: No pertinent imaging reviewed.     Recent Cultures (last 7 days):         Last 24 Hours Medication List:   Current Facility-Administered Medications   Medication Dose Route Frequency Provider Last Rate    acetaminophen  650 mg Oral Q6H PRN Estelle Huff MD      apixaban  5 mg Oral BID Estelle Huff MD      atorvastatin  10 mg Oral Daily With Waqas Batres MD      bumetanide  6 mg Oral BID Estelle Huff MD      Empagliflozin  10 mg Oral Daily Estelle Huff MD      fluticasone  1 spray Nasal BID Maximo Luu Ceci Hwang MD      hydrALAZINE  5 mg Intravenous Q6H PRN Christopher Oakes MD      insulin glargine  14 Units Subcutaneous QAM Josemanuel Townsend PA-C      insulin lispro  2-12 Units Subcutaneous 4x Daily (AC & HS) Moni Rodríguez PA-C      insulin lispro  8 Units Subcutaneous TID With Meals Josemanuel Townsend PA-C      ipratropium  0.5 mg Nebulization 4x Daily Omid Thomas MD      levalbuterol  1.25 mg Nebulization Q6H PRN Christopher Oakes MD      levalbuterol  1.25 mg Nebulization 4x Daily Omid Thomas MD      loratadine  10 mg Oral Daily Christopher Oakes MD      melatonin  3 mg Oral HS Moni Rodríguez PA-C      metoprolol succinate  50 mg Oral Daily Christopher Oakes MD      ondansetron  4 mg Intravenous Q4H PRN Christopher Oakes MD      pantoprazole  40 mg Oral Daily Christopher Oakes MD      predniSONE  40 mg Oral Daily Tatiana Francis MD      pregabalin  50 mg Oral HS Christopher Oakes MD      spironolactone  25 mg Oral Daily Christopher Oakes MD          Today, Patient Was Seen By: Josemanuel Townsend PA-C    **Please Note: This note may have been constructed using a voice recognition system. **

## 2023-10-19 NOTE — PLAN OF CARE
Problem: Potential for Falls  Goal: Patient will remain free of falls  Description: INTERVENTIONS:  - Educate patient/family on patient safety including physical limitations  - Instruct patient to call for assistance with activity   - Consult OT/PT to assist with strengthening/mobility   - Keep Call bell within reach  - Keep bed low and locked with side rails adjusted as appropriate  - Keep care items and personal belongings within reach  - Initiate and maintain comfort rounds  - Make Fall Risk Sign visible to staff  - Offer Toileting every 2 Hours, in advance of need  - Initiate/Maintain alarm  - Obtain necessary fall risk management equipment: yellow socks  - Apply yellow socks and bracelet for high fall risk patients  - Consider moving patient to room near nurses station  Outcome: Progressing     Problem: PAIN - ADULT  Goal: Verbalizes/displays adequate comfort level or baseline comfort level  Description: Interventions:  - Encourage patient to monitor pain and request assistance  - Assess pain using appropriate pain scale  - Administer analgesics based on type and severity of pain and evaluate response  - Implement non-pharmacological measures as appropriate and evaluate response  - Consider cultural and social influences on pain and pain management  - Notify physician/advanced practitioner if interventions unsuccessful or patient reports new pain  Outcome: Progressing     Problem: INFECTION - ADULT  Goal: Absence or prevention of progression during hospitalization  Description: INTERVENTIONS:  - Assess and monitor for signs and symptoms of infection  - Monitor lab/diagnostic results  - Monitor all insertion sites, i.e. indwelling lines, tubes, and drains  - Monitor endotracheal if appropriate and nasal secretions for changes in amount and color  - [place] appropriate cooling/warming therapies per order  - Administer medications as ordered  - Instruct and encourage patient and family to use good hand hygiene technique  - Identify and instruct in appropriate isolation precautions for identified infection/condition  Outcome: Progressing  Goal: Absence of fever/infection during neutropenic period  Description: INTERVENTIONS:  - Monitor WBC    Outcome: Progressing     Problem: SAFETY ADULT  Goal: Patient will remain free of falls  Description: INTERVENTIONS:  - Educate patient/family on patient safety including physical limitations  - Instruct patient to call for assistance with activity   - Consult OT/PT to assist with strengthening/mobility   - Keep Call bell within reach  - Keep bed low and locked with side rails adjusted as appropriate  - Keep care items and personal belongings within reach  - Initiate and maintain comfort rounds  - Make Fall Risk Sign visible to staff  - Offer Toileting every 2 Hours, in advance of need  - Initiate/Maintain alarm  - Obtain necessary fall risk management equipment:   - Apply yellow socks and bracelet for high fall risk patients  - Consider moving patient to room near nurses station  Outcome: Progressing  Goal: Maintain or return to baseline ADL function  Description: INTERVENTIONS:  -  Assess patient's ability to carry out ADLs; assess patient's baseline for ADL function and identify physical deficits which impact ability to perform ADLs (bathing, care of mouth/teeth, toileting, grooming, dressing, etc.)  - Assess/evaluate cause of self-care deficits   - Assess range of motion  - Assess patient's mobility; develop plan if impaired  - Assess patient's need for assistive devices and provide as appropriate  - Encourage maximum independence but intervene and supervise when necessary  - Involve family in performance of ADLs  - Assess for home care needs following discharge   - Consider OT consult to assist with ADL evaluation and planning for discharge  - Provide patient education as appropriate  Outcome: Progressing  Goal: Maintains/Returns to pre admission functional level  Description: INTERVENTIONS:  - Perform BMAT or MOVE assessment daily.   - Set and communicate daily mobility goal to care team and patient/family/caregiver. - Collaborate with rehabilitation services on mobility goals if consulted  - Perform Range of Motion 3 times a day. - Reposition patient every 2 hours. - Dangle patient 3 times a day  - Stand patient 3 times a day  - Ambulate patient 3 times a day  - Out of bed to chair 3 times a day   - Out of bed for meals 3 times a day  - Out of bed for toileting  - Record patient progress and toleration of activity level   Outcome: Progressing     Problem: DISCHARGE PLANNING  Goal: Discharge to home or other facility with appropriate resources  Description: INTERVENTIONS:  - Identify barriers to discharge w/patient and caregiver  - Arrange for needed discharge resources and transportation as appropriate  - Identify discharge learning needs (meds, wound care, etc.)  - Arrange for interpretive services to assist at discharge as needed  - Refer to Case Management Department for coordinating discharge planning if the patient needs post-hospital services based on physician/advanced practitioner order or complex needs related to functional status, cognitive ability, or social support system  Outcome: Progressing     Problem: Knowledge Deficit  Goal: Patient/family/caregiver demonstrates understanding of disease process, treatment plan, medications, and discharge instructions  Description: Complete learning assessment and assess knowledge base.   Interventions:  - Provide teaching at level of understanding  - Provide teaching via preferred learning methods  Outcome: Progressing     Problem: CARDIOVASCULAR - ADULT  Goal: Maintains optimal cardiac output and hemodynamic stability  Description: INTERVENTIONS:  - Monitor I/O, vital signs and rhythm  - Monitor for S/S and trends of decreased cardiac output  - Administer and titrate ordered vasoactive medications to optimize hemodynamic stability  - Assess quality of pulses, skin color and temperature  - Assess for signs of decreased coronary artery perfusion  - Instruct patient to report change in severity of symptoms  Outcome: Progressing  Goal: Absence of cardiac dysrhythmias or at baseline rhythm  Description: INTERVENTIONS:  - Continuous cardiac monitoring, vital signs, obtain 12 lead EKG if ordered  - Administer antiarrhythmic and heart rate control medications as ordered  - Monitor electrolytes and administer replacement therapy as ordered  Outcome: Progressing     Problem: RESPIRATORY - ADULT  Goal: Achieves optimal ventilation and oxygenation  Description: INTERVENTIONS:  - Assess for changes in respiratory status  - Assess for changes in mentation and behavior  - Position to facilitate oxygenation and minimize respiratory effort  - Oxygen administered by appropriate delivery if ordered  - Initiate smoking cessation education as indicated  - Encourage broncho-pulmonary hygiene including cough, deep breathe, Incentive Spirometry  - Assess the need for suctioning and aspirate as needed  - Assess and instruct to report SOB or any respiratory difficulty  - Respiratory Therapy support as indicated  Outcome: Progressing

## 2023-10-19 NOTE — PLAN OF CARE
Problem: INFECTION - ADULT  Goal: Absence or prevention of progression during hospitalization  Description: INTERVENTIONS:  - Assess and monitor for signs and symptoms of infection  - Monitor lab/diagnostic results  - Monitor all insertion sites, i.e. indwelling lines, tubes, and drains  - Monitor endotracheal if appropriate and nasal secretions for changes in amount and color  - [place] appropriate cooling/warming therapies per order  - Administer medications as ordered  - Instruct and encourage patient and family to use good hand hygiene technique  - Identify and instruct in appropriate isolation precautions for identified infection/condition  Outcome: Progressing     Problem: PAIN - ADULT  Goal: Verbalizes/displays adequate comfort level or baseline comfort level  Description: Interventions:  - Encourage patient to monitor pain and request assistance  - Assess pain using appropriate pain scale  - Administer analgesics based on type and severity of pain and evaluate response  - Implement non-pharmacological measures as appropriate and evaluate response  - Consider cultural and social influences on pain and pain management  - Notify physician/advanced practitioner if interventions unsuccessful or patient reports new pain  Outcome: Progressing

## 2023-10-19 NOTE — PROGRESS NOTES
[facility] coding opportunities          Chart Reviewed number of suggestions sent to Provider: 2   I13.0  E11.22, N18.32    Patients Insurance        Commercial Insurance: [employer]

## 2023-10-19 NOTE — ASSESSMENT & PLAN NOTE
?related to asthma/atopy however pulmonology recommended consultation with hematology for further workup  No further hematology work-up or management indicated, occasional mild eosinophilia is consistent with his allergic/eosinophilic asthma diagnosis and there is no concern of underlying hematological malignancy per heme onc  Can f/u additional specialty labs sent out as outpatient

## 2023-10-20 ENCOUNTER — TRANSITIONAL CARE MANAGEMENT (OUTPATIENT)
Dept: FAMILY MEDICINE CLINIC | Facility: CLINIC | Age: 58
End: 2023-10-20

## 2023-10-20 VITALS
BODY MASS INDEX: 41.75 KG/M2 | HEIGHT: 73 IN | HEART RATE: 97 BPM | WEIGHT: 315 LBS | OXYGEN SATURATION: 93 % | SYSTOLIC BLOOD PRESSURE: 158 MMHG | DIASTOLIC BLOOD PRESSURE: 77 MMHG | RESPIRATION RATE: 18 BRPM | TEMPERATURE: 97.2 F

## 2023-10-20 PROBLEM — J45.901 ASTHMA EXACERBATION: Status: RESOLVED | Noted: 2021-07-18 | Resolved: 2023-10-20

## 2023-10-20 LAB
A FUMIGATUS IGE QN: <0.1 KUA/I
ANION GAP SERPL CALCULATED.3IONS-SCNC: 10 MMOL/L
BASOPHILS # BLD AUTO: 0.04 THOUSANDS/ÂΜL (ref 0–0.1)
BASOPHILS NFR BLD AUTO: 0 % (ref 0–1)
BUN SERPL-MCNC: 54 MG/DL (ref 5–25)
CALCIUM SERPL-MCNC: 8.2 MG/DL (ref 8.4–10.2)
CCP AB SER IA-ACNC: 0.9
CHLORIDE SERPL-SCNC: 96 MMOL/L (ref 96–108)
CO2 SERPL-SCNC: 31 MMOL/L (ref 21–32)
CREAT SERPL-MCNC: 1.96 MG/DL (ref 0.6–1.3)
EOSINOPHIL # BLD AUTO: 0.39 THOUSAND/ÂΜL (ref 0–0.61)
EOSINOPHIL NFR BLD AUTO: 4 % (ref 0–6)
ERYTHROCYTE [DISTWIDTH] IN BLOOD BY AUTOMATED COUNT: 16.3 % (ref 11.6–15.1)
GFR SERPL CREATININE-BSD FRML MDRD: 37 ML/MIN/1.73SQ M
GLUCOSE SERPL-MCNC: 219 MG/DL (ref 65–140)
GLUCOSE SERPL-MCNC: 231 MG/DL (ref 65–140)
GLUCOSE SERPL-MCNC: 314 MG/DL (ref 65–140)
HCT VFR BLD AUTO: 37.5 % (ref 36.5–49.3)
HGB BLD-MCNC: 11.6 G/DL (ref 12–17)
IMM GRANULOCYTES # BLD AUTO: 0.16 THOUSAND/UL (ref 0–0.2)
IMM GRANULOCYTES NFR BLD AUTO: 2 % (ref 0–2)
LYMPHOCYTES # BLD AUTO: 1.83 THOUSANDS/ÂΜL (ref 0.6–4.47)
LYMPHOCYTES NFR BLD AUTO: 17 % (ref 14–44)
MCH RBC QN AUTO: 25.4 PG (ref 26.8–34.3)
MCHC RBC AUTO-ENTMCNC: 30.9 G/DL (ref 31.4–37.4)
MCV RBC AUTO: 82 FL (ref 82–98)
MONOCYTES # BLD AUTO: 0.6 THOUSAND/ÂΜL (ref 0.17–1.22)
MONOCYTES NFR BLD AUTO: 6 % (ref 4–12)
NEUTROPHILS # BLD AUTO: 7.85 THOUSANDS/ÂΜL (ref 1.85–7.62)
NEUTS SEG NFR BLD AUTO: 71 % (ref 43–75)
NRBC BLD AUTO-RTO: 0 /100 WBCS
PLATELET # BLD AUTO: 333 THOUSANDS/UL (ref 149–390)
PMV BLD AUTO: 11 FL (ref 8.9–12.7)
POTASSIUM SERPL-SCNC: 4.1 MMOL/L (ref 3.5–5.3)
PROCALCITONIN SERPL-MCNC: 0.12 NG/ML
RBC # BLD AUTO: 4.56 MILLION/UL (ref 3.88–5.62)
SODIUM SERPL-SCNC: 137 MMOL/L (ref 135–147)
TOTAL IGE SMQN RAST: 625 KU/L (ref 0–113)
WBC # BLD AUTO: 10.87 THOUSAND/UL (ref 4.31–10.16)

## 2023-10-20 PROCEDURE — 94640 AIRWAY INHALATION TREATMENT: CPT

## 2023-10-20 PROCEDURE — 94760 N-INVAS EAR/PLS OXIMETRY 1: CPT

## 2023-10-20 PROCEDURE — 82948 REAGENT STRIP/BLOOD GLUCOSE: CPT

## 2023-10-20 PROCEDURE — 99239 HOSP IP/OBS DSCHRG MGMT >30: CPT | Performed by: NURSE PRACTITIONER

## 2023-10-20 PROCEDURE — 94664 DEMO&/EVAL PT USE INHALER: CPT

## 2023-10-20 PROCEDURE — 85025 COMPLETE CBC W/AUTO DIFF WBC: CPT | Performed by: INTERNAL MEDICINE

## 2023-10-20 PROCEDURE — 88184 FLOWCYTOMETRY/ TC 1 MARKER: CPT | Performed by: INTERNAL MEDICINE

## 2023-10-20 PROCEDURE — 80048 BASIC METABOLIC PNL TOTAL CA: CPT | Performed by: INTERNAL MEDICINE

## 2023-10-20 PROCEDURE — 88185 FLOWCYTOMETRY/TC ADD-ON: CPT | Performed by: INTERNAL MEDICINE

## 2023-10-20 PROCEDURE — 84145 PROCALCITONIN (PCT): CPT | Performed by: INTERNAL MEDICINE

## 2023-10-20 RX ORDER — PREDNISONE 10 MG/1
TABLET ORAL
Qty: 13 TABLET | Refills: 0 | Status: SHIPPED | OUTPATIENT
Start: 2023-10-21 | End: 2023-10-28

## 2023-10-20 RX ADMIN — INSULIN LISPRO 8 UNITS: 100 INJECTION, SOLUTION INTRAVENOUS; SUBCUTANEOUS at 12:19

## 2023-10-20 RX ADMIN — LORATADINE 10 MG: 10 TABLET ORAL at 09:05

## 2023-10-20 RX ADMIN — EMPAGLIFLOZIN 10 MG: 10 TABLET, FILM COATED ORAL at 09:05

## 2023-10-20 RX ADMIN — IPRATROPIUM BROMIDE 0.5 MG: 0.5 SOLUTION RESPIRATORY (INHALATION) at 13:33

## 2023-10-20 RX ADMIN — INSULIN LISPRO 8 UNITS: 100 INJECTION, SOLUTION INTRAVENOUS; SUBCUTANEOUS at 09:03

## 2023-10-20 RX ADMIN — SPIRONOLACTONE 25 MG: 25 TABLET ORAL at 09:05

## 2023-10-20 RX ADMIN — FLUTICASONE PROPIONATE 1 SPRAY: 50 SPRAY, METERED NASAL at 09:04

## 2023-10-20 RX ADMIN — METOPROLOL SUCCINATE 50 MG: 50 TABLET, EXTENDED RELEASE ORAL at 09:05

## 2023-10-20 RX ADMIN — IPRATROPIUM BROMIDE 0.5 MG: 0.5 SOLUTION RESPIRATORY (INHALATION) at 07:07

## 2023-10-20 RX ADMIN — LEVALBUTEROL HYDROCHLORIDE 1.25 MG: 1.25 SOLUTION RESPIRATORY (INHALATION) at 07:07

## 2023-10-20 RX ADMIN — BUMETANIDE 6 MG: 2 TABLET ORAL at 09:05

## 2023-10-20 RX ADMIN — INSULIN LISPRO 8 UNITS: 100 INJECTION, SOLUTION INTRAVENOUS; SUBCUTANEOUS at 12:20

## 2023-10-20 RX ADMIN — PANTOPRAZOLE SODIUM 40 MG: 40 TABLET, DELAYED RELEASE ORAL at 09:05

## 2023-10-20 RX ADMIN — APIXABAN 5 MG: 5 TABLET, FILM COATED ORAL at 09:05

## 2023-10-20 RX ADMIN — PREDNISONE 40 MG: 20 TABLET ORAL at 09:05

## 2023-10-20 RX ADMIN — LEVALBUTEROL HYDROCHLORIDE 1.25 MG: 1.25 SOLUTION RESPIRATORY (INHALATION) at 13:33

## 2023-10-20 RX ADMIN — INSULIN LISPRO 4 UNITS: 100 INJECTION, SOLUTION INTRAVENOUS; SUBCUTANEOUS at 09:03

## 2023-10-20 RX ADMIN — INSULIN GLARGINE 14 UNITS: 100 INJECTION, SOLUTION SUBCUTANEOUS at 09:04

## 2023-10-20 NOTE — CASE MANAGEMENT
Case Management Discharge Planning Note    Patient name Blaine Nickerson  Location /-73 MRN [id number]  : 1965 Date 10/20/2023       Current Admission Date: 10/16/2023  Current Admission Diagnosis:Chronic diastolic CHF [facility])   Patient Active Problem List    Diagnosis Date Noted    Eosinophilia 10/18/2023    Anemia of chronic disease 10/16/2023    Nasal congestion 10/06/2023    Severe persistent asthma without complication     Loose stools 2023    Chronic diastolic CHF ([address])     Diabetic polyneuropathy associated with type 2 diabetes mellitus ([address]) 2022    CKD (chronic kidney disease) stage 3 ([address]) 2022    Presence of CardioMEMS HF system 2022    Paroxysmal atrial fibrillation ([address]) 2022    Chronic heart failure with preserved ejection fraction (HFpEF) ([address]) 2021    Severe persistent asthma 10/11/2021    HNP (herniated nucleus pulposus), lumbar 2021    Foraminal stenosis of lumbar region 2021    VICKY (obstructive sleep apnea)     Hyperlipidemia 2019    Primary osteoarthritis of both knees 2018    Irritable bowel syndrome with diarrhea 2018    Primary hypertension 2018    Type 2 diabetes mellitus with diabetic polyneuropathy ([address]) 2018    Vitamin B12 deficiency 2016    Vitamin D deficiency 2016    Morbid obesity ([address]) 2016    Primary osteoarthritis of right knee 10/15/2013      LOS (days): 4  Geometric Mean LOS (GMLOS) (days):   Days to GMLOS:     OBJECTIVE:  Risk of Unplanned Readmission Score: 32.75         Current admission status: Inpatient   Preferred Pharmacy:   [facility] [address] [place] [address], [address] [place] [address]  Phone: [phone] Fax: [phone]    Primary Care Provider: Davis Alcazar MD    Primary Insurance: Pat Estrada  Secondary Insurance:     DISCHARGE DETAILS:         Other Referral/Resources/Interventions Provided:  Interventions: Transportation  Referral Comments: Per pt's request, CM scheduled lyft transport home. Pt signed lyft waiver. Signed waiver placed in pt's chart. Treatment Team Recommendation: Home  Discharge Destination Plan[de-identified] Home  Transport at Discharge : Ride Share  Dispatcher Contacted: Yes  Number/Name of Dispatcher: Roundtrip  Transported by [employer] and Unit #):  Lyft  ETA of Transport (Date): 10/20/23  ETA of Transport (Time): 1430

## 2023-10-20 NOTE — DISCHARGE SUMMARY
Discharge Summary - [facility]    Patient Information: Clara Starkey 66 y.o. male MRN: [id number]  Unit/Bed#: -01 Encounter: [id number]    Discharging Physician / Practitioner: RODRIGUE Dacosta  PCP: Blanca La MD  Admission Date: 10/16/2023  Discharge Date: 10/20/23    Reason for Admission: acute asthma exacerbation    Discharge Diagnoses:     Principal Problem (Resolved): Asthma exacerbation  Active Problems:    CKD (chronic kidney disease) stage 3 (HCC)    Type 2 diabetes mellitus with diabetic polyneuropathy (HCC)    Morbid obesity (HCC)    Hyperlipidemia    Paroxysmal atrial fibrillation (HCC)    Primary hypertension    Chronic diastolic CHF ([address])    Anemia of chronic disease    Eosinophilia  Resolved Problems:    Hyponatremia      Consultations During Hospital Stay:  Pulmonology  Oncology     Procedures Performed:     none    Significant Findings / Test Results:     CT C/A/P with contrast No acute thoracic or abdominopelvic pathology     Incidental Findings:   none     Test Results Pending at Discharge (will require follow up):   none     Outpatient Tests Requested: Outpatient f/u with PCP  Outpatient f/u with pulm as scheduled     Complications:  none     Hospital Course:     Clara Starkey is a 66 y.o. male patient with a past medical history of asthma, A-fib on Eliquis, VICKY not on CPAP, type 2 diabetes, CKD, history of gastric bypass, history of tobacco use, heart failure, eosinophilia, anemia, obesity, hypertension who originally presented to the hospital on 10/16/2023 due to this of breath and wheezing. Was started on IV Solu-Medrol, once improved, transition to oral prednisone. We will also continue Symbicort/Spiriva and Fasenra on discharge. Pulmonology evaluated patient, they recommended hematology consult given significant eosinophilia which they are attributing to allergic/asthma picture. No concern for underlying hematological malignancy.     Patient is stable for discharge home today. Complete steroid taper as ordered by pulmonology. He should follow-up with PCP and already has follow-up scheduled with pulmonology. Condition at Discharge: stable     Discharge Day Visit / Exam:     Subjective: No complaints. Denies wheezing, shortness of breath. Feels well and is agreeable for discharge. Vitals: Blood Pressure: 158/77 (10/20/23 0900)  Pulse: 97 (10/20/23 0900)  Temperature: (!) 97.2 °F (36.2 °C) (10/20/23 0730)  Temp Source: Oral (10/19/23 0747)  Respirations: 18 (10/20/23 0730)  Height: 6' 1" (185.4 cm) (10/16/23 1619)  Weight - Scale: (!) 154 kg (338 lb 9.6 oz) (10/20/23 0600)  SpO2: 96 % (10/20/23 0900)  Exam:   Physical Exam  Vitals and nursing note reviewed. Constitutional:       General: He is not in acute distress. Appearance: He is obese. Comments: On RA   Cardiovascular:      Rate and Rhythm: Normal rate. Pulmonary:      Effort: No respiratory distress. Breath sounds: Decreased breath sounds present. No wheezing. Abdominal:      Tenderness: There is no abdominal tenderness. Musculoskeletal:         General: No swelling. Skin:     General: Skin is warm. Neurological:      Mental Status: He is alert and oriented to person, place, and time. Mental status is at baseline. Psychiatric:         Mood and Affect: Mood normal.         Discussion with Family: patient     Discharge instructions/Information to patient and family:   See after visit summary for information provided to patient and family. Provisions for Follow-Up Care:  See after visit summary for information related to follow-up care and any pertinent home health orders. Disposition:     Home    For Discharges to [facility]:   Not Applicable to this Patient - Not Applicable to this Patient    Planned Readmission: no     Discharge Statement:  I spent 40 minutes discharging the patient. This time was spent on the day of discharge.  I had direct contact with the patient on the day of discharge. Greater than 50% of the total time was spent examining patient, answering all patient questions, arranging and discussing plan of care with patient as well as directly providing post-discharge instructions. Additional time then spent on discharge activities. Discharge Medications:  See after visit summary for reconciled discharge medications provided to patient and family.       ** Please Note: This note has been constructed using a voice recognition system **

## 2023-10-21 LAB
A NIGER IGE QN: <0.1 KU/L
BACTERIA SPT RESP CULT: ABNORMAL
GRAM STN SPEC: ABNORMAL
GRAM STN SPEC: ABNORMAL

## 2023-10-22 LAB — SCAN RESULT: NORMAL

## 2023-10-23 ENCOUNTER — TELEPHONE (OUTPATIENT)
Dept: HEMATOLOGY ONCOLOGY | Facility: CLINIC | Age: 58
End: 2023-10-23

## 2023-10-23 DIAGNOSIS — I48.91 ATRIAL FIBRILLATION, UNSPECIFIED TYPE (HCC): ICD-10-CM

## 2023-10-23 DIAGNOSIS — I48.91 A-FIB (HCC): ICD-10-CM

## 2023-10-23 LAB
C-ANCA TITR SER IF: NORMAL TITER
DSDNA AB SER QL CLIF: NEGATIVE
MYELOPEROXIDASE AB SER IA-ACNC: <0.2 UNITS (ref 0–0.9)
P-ANCA ATYPICAL TITR SER IF: NORMAL TITER
P-ANCA TITR SER IF: NORMAL TITER
PROTEINASE3 AB SER IA-ACNC: <0.2 UNITS (ref 0–0.9)

## 2023-10-23 NOTE — TELEPHONE ENCOUNTER
I contacted the patient to schedule a hospital follow up appointment, As per In-basket message. A voicemail was left with the [facility] number [phone] to return the call to schedule the appointment. In-Basket Message:  María Keller DO    Patient was discharged recently from [facility]    Needs an appointment with Dr. Delinda Dubin at next available opening. Diagnosis is Eosinophilia ; r/o bone marrow abnormality.

## 2023-10-23 NOTE — UTILIZATION REVIEW
NOTIFICATION OF ADMISSION DISCHARGE   This is a Notification of Discharge from [facility]. Please be advised that this patient has been discharge from our facility. Below you will find the admission and discharge date and time including the patient’s disposition. UTILIZATION REVIEW CONTACT:  Ceasar Stone  Utilization   Network Utilization Review Department  Phone: [phone] x carefully listen to the prompts. All voicemails are confidential.  Email: [email or web address] [employer]     ADMISSION INFORMATION  PRESENTATION DATE: 10/16/2023  7:41 AM  OBERVATION ADMISSION DATE:   INPATIENT ADMISSION DATE: 10/16/23 11:24 AM   DISCHARGE DATE: 10/20/2023  3:04 PM   DISPOSITION:Home/Self Care    Network Utilization Review Department  ATTENTION: Please call with any questions or concerns to [phone] and carefully listen to the prompts so that you are directed to the right person. All voicemails are confidential.   For Discharge needs, contact Care Management DC Support Team at [phone] opt. 2  Send all requests for admission clinical reviews, approved or denied determinations and any other requests to dedicated fax number below belonging to the campus where the patient is receiving treatment.  List of dedicated fax numbers for the Facilities:  [place] DENIALS (Administrative/Medical Necessity) [phone]   DISCHARGE SUPPORT TEAM (Network) [phone] [address] [facility] (Maternity/NICU/Pediatrics) [phone] 333 e [facility] [address] [place] [address] [id number] [place] [address] [phone]   [facility] [phone]   [address] [place] [address]  [address]  [phone]

## 2023-10-24 ENCOUNTER — TELEPHONE (OUTPATIENT)
Dept: HEMATOLOGY ONCOLOGY | Facility: CLINIC | Age: 58
End: 2023-10-24

## 2023-10-24 NOTE — TELEPHONE ENCOUNTER
Patient was contacted to schedule a hospital follow up appointment, As per In-basket message. A voicemail was left with the [facility] number [phone] to return the call to schedule the appointment. In-Basket Message:  Julianne Peace DO    Patient was discharged recently from [facility]     Needs an appointment with Dr. Noé Maravilla at next available opening. Diagnosis is Eosinophilia ; r/o bone marrow abnormality.

## 2023-10-24 NOTE — UTILIZATION REVIEW
INSURANCE REQUESTING ADDITIONAL INFORMATION  Taty Mendez # [id number]     Continued Stay Review    Date: 10/11/2023                          Current Patient Class: Inpatient  Current Level of Care: Med/Surg    HPI:58 y.o. male initially admitted on 10/06/2023     Assessment/Plan: Acute on Chronic Diastolic CHF. Telemetry. Diuresis- change to PO. Increase Bumex to 6mg PO BID. Strict I&O. Daily weight. Monitor BMP. Continue eliquis. Add flonase & claritin. Continue Low NA / fld restricted diet. Replace electrolytes PRN and monitor renal function. Not currently on CPAP as patient awaiting new device (current device recalled).     VTE PPX : Eliquis / Isaak SCDs    Vital Signs:   10/11/23 2300 -- -- -- -- -- -- None (Room air) --   10/11/23 22:02:30 98.2 °F (36.8 °C) 76 20 132/75 94 96 % None (Room air) --   10/11/23 19:10:11 98.7 °F (37.1 °C) 83 18 136/79 98 94 % None (Room air) --   10/11/23 15:33:20 97.5 °F (36.4 °C) 81 18 132/80 97 94 % -- --   10/11/23 11:22:02 98.4 °F (36.9 °C) 79 17 141/85 104 94 % -- --   10/11/23 1100 -- -- -- -- -- -- None (Room air) --   10/11/23 09:28:21 98.1 °F (36.7 °C) 76 -- 140/84 103 94 % -- --   10/11/23 02:25:28 -- 74 -- 121/52 75 94 % -- --     Pertinent Labs/Diagnostic Results:     Results from last 7 days   Lab Units 10/20/23  0708 10/19/23  0511 10/18/23  0622   WBC Thousand/uL 10.87* 12.30* 11.71*   HEMOGLOBIN g/dL 11.6* 10.9* 10.4*   HEMATOCRIT % 37.5 35.2* 33.4*   PLATELETS Thousands/uL 333 311 302   NEUTROS ABS Thousands/µL 7.85* 9.78* 10.41*     Results from last 7 days   Lab Units 10/20/23  0638 10/19/23  0511 10/18/23  0622   SODIUM mmol/L 137 139 133*   POTASSIUM mmol/L 4.1 2.9* 3.6   CHLORIDE mmol/L 96 96 93*   CO2 mmol/L 31 32 28   ANION GAP mmol/L 10 11 12   BUN mg/dL 54* 54* 50*   CREATININE mg/dL 1.96* 2.00* 2.01*   EGFR ml/min/1.73sq m 37 36 36   CALCIUM mg/dL 8.2* 8.7 8.5   MAGNESIUM mg/dL  --  2.7 2.8*     Results from last 7 days   Lab Units 10/20/23  1154 10/20/23  6045 10/19/23  2035 10/19/23  1659 10/19/23  1136 10/19/23  0616 10/18/23  2057 10/18/23  1713 10/18/23  1146 10/18/23  0804 10/18/23  0621 10/17/23  2041   POC GLUCOSE mg/dl 314* 231* 272* 271* 275* 206* 296* 356* 400* 405* 398* 415*     Results from last 7 days   Lab Units 10/20/23  0638 10/19/23  0511 10/18/23  0622   GLUCOSE RANDOM mg/dL 219* 128 368*     Results from last 7 days   Lab Units 10/20/23  0638   PROCALCITONIN ng/ml 0.12     Results from last 7 days   Lab Units 10/19/23  1918   SPUTUM CULTURE  3+ Growth of   GRAM STAIN RESULT  1+ Polys*  Rare Gram negative rods*     Medications:   Current Facility-Administered Medications   Medication Dose Route Frequency    albuterol  2 puff Inhalation Q4H PRN    apixaban  5 mg Oral BID    atorvastatin  10 mg Oral Daily    budesonide-formoterol  2 puff Inhalation BID    bumetanide  6 mg Oral BID    Empagliflozin  10 mg Oral Daily    fluticasone  1 spray Each Nare BID    insulin lispro  1-6 Units Subcutaneous HS    insulin lispro  2-12 Units Subcutaneous TID AC    insulin lispro  5 Units Subcutaneous TID With Meals    loratadine  10 mg Oral Daily    melatonin  6 mg Oral HS    metoprolol succinate  50 mg Oral Daily    montelukast  10 mg Oral HS    pantoprazole  40 mg Oral Daily    potassium chloride  40 mEq Oral BID    pregabalin  50 mg Oral HS    spironolactone  25 mg Oral Daily    umeclidinium  1 puff Inhalation Daily       Discharge Plan: TBD    Network Utilization Review Department  ATTENTION: Please call with any questions or concerns to [phone] and carefully listen to the prompts so that you are directed to the right person. All voicemails are confidential.   For Discharge needs, contact Care Management DC Support Team at [phone] opt. 2  Send all requests for admission clinical reviews, approved or denied determinations and any other requests to dedicated fax number below belonging to the campus where the patient is receiving treatment.  List of dedicated fax numbers for the Facilities:  [place] DENPRIMO (Administrative/Medical Necessity) [phone]   DISCHARGE SUPPORT TEAM (NETWORK) [id number] [address]d (Maternity/NICU/Pediatrics) [phone]   [address] [place] [address] [address] [place] [address] [address] [facility] [phone] [address] [place] [address] [phone]   [address] [address] [phone]

## 2023-10-25 ENCOUNTER — TELEPHONE (OUTPATIENT)
Dept: HEMATOLOGY ONCOLOGY | Facility: CLINIC | Age: 58
End: 2023-10-25

## 2023-10-25 NOTE — TELEPHONE ENCOUNTER
Called patient to assist in scheduling Hospital follow up appt with Dr Diallo Disla. Per Quiana, pt is OK to wait to be seen by Diallo Disla.  [employer] message sent to patient requesting him to call in to schedule

## 2023-10-25 NOTE — PROGRESS NOTES
Heart Failure Outpatient Progress Note - Yonas Miller 66 y.o. male MRN: [id number]    @ Encounter: [id number]      Assessment/Plan:    Patient Active Problem List    Diagnosis Date Noted    Nasal congestion 10/06/2023    Severe persistent asthma without complication 89/42/1974    Loose stools 04/17/2023    Chronic diastolic CHF ([address]) 02/84/6383    Diabetic polyneuropathy associated with type 2 diabetes mellitus ([address]) 12/20/2022    CKD (chronic kidney disease) stage 3 ([address]) 09/16/2022    Presence of CardioMEMS HF system 03/09/2022    Paroxysmal atrial fibrillation ([address]) 03/01/2022    Chronic heart failure with preserved ejection fraction (HFpEF) ([address]) 11/12/2021    Severe persistent asthma with exacerbation 10/11/2021    HNP (herniated nucleus pulposus), lumbar 02/23/2021    Foraminal stenosis of lumbar region 02/23/2021    VICKY (obstructive sleep apnea)     Hyperlipidemia 04/18/2019    Primary osteoarthritis of both knees 06/14/2018    Irritable bowel syndrome with diarrhea 01/30/2018    Primary hypertension 01/30/2018    Type 2 diabetes mellitus with diabetic polyneuropathy ([address]) 01/30/2018    Vitamin B12 deficiency 03/08/2016    Vitamin D deficiency 03/08/2016    Morbid obesity ([address]) 02/23/2016    Primary osteoarthritis of right knee 10/15/2013    Eosinophilia 10/18/2023    Anemia of chronic disease 10/16/2023     Chronic HFpEF, Stage C, NYHA III. Above PAD goal of 15 past 3 days in the low 20's. Possibly 2/2 to recent asthma exacerbation and now on steroid taper. Had good response to Metolazone yesterday. Appears compensated on exam. He is below his dry weight. [place] at discharge 343 lbs, today,  338 lbs.      Volume management :  Bumex 6 mg PO BID, Jardiance 10 mg daily, Spironolactone 25 mg daily, Metolazone 2.5 mg PRN    Cardiomems  Data: PAD Goal- 15 mmhg  10-24-10/27- 19, 21, 22    RHC with Cardiomems calibration:  CardioMEMS rep Bon Sanchez was present throughout the case and provided the simultaneous MEMS interrogation data. -140 during case  HR 74  O2 sat 97%  Hgb 10.5     RA 8  RV 30/8  PA 28/12, 17  PCWP 10-12 (multiple checks)  TPG 5  PA sat 59% (multiple checks)  Travis CO/CI: 6.6/2.3  TD CO/CI: 7.43/2.78     Simultaneous CardioMEMS data:  Mean: 26/10/15  End expiration on MEMS waveform: 28/10      Impression/Recommendations: The patient tolerated the procedure well; no immediate complications. Normal biv filling pressures (after inpt diuresis). Normal CO/CI. CardioMEMS data correlates to RHC.  s/p CardioMems implant 3/9/22, GOAL PAD 15-18  PAD 16 10/6, 14 10/5. BNP 10/6/23: 93  122  233      Studies      LHC 9/6/22: No obstructive CAD     Pharm Nuc Stress 9/2/22: Abnormal study due to the presence of an inferior and inferolateral infarct without significant ischemia. [place] 3/9/22:   RA 4   RV 35/4   PA 35/12/21   PCWP 10   PA sat 64%   Travis CO 5.56 L/min   Travis CI 2.2L/min/m2   PVR 1.97 SAGASTUME      TTE 4/11/2023: LVEF 50%. Normal wall motion. Grade 2 DD. RV cavity size and systolic function normal.  LA mildly dilated. Trace MR, TR. Normal IVC. TTE 03/25/2020: LVEF 40-45%. LVIDd 6.12 cm. Normal RV. TTE 07/19/2021: LVEF 50%. LVIDd 6.13 cm. Grade 1 DD. Normal RV. Trace MR and TR. Mildly dilated IVC. TTE 11/12/2021: LVEF 55%. LVIDd 6.0 cm. Grade 1 DD. Normal RV. Trace TR. Atrial fibrillation   [id number] = 3 (HF, HTN, DM). Diagnosed 02/2022. Anticoagulation on Eliquis. Rate control: BB as above. Rhythm control: No.      Hypertension, controlled overall. Mildly elevated in office today. May be from being on Prednisone. Rx: amlodipine 5mg daily      Hyperlipidemia   11/22/22:   HDL 55 LDL 85  Rx: atorvastatin 10 mg daily. Diabetes mellitus, type II     Non-insulin dependent. HbA1c 8/9/23: 7.1%      Obstructive sleep apnea  Not currently using CPAP given machine recall.    Chronic respiratory failure   Asthma, moderate persistent    S/p gastric bypass (Samuel-en-Y)surgery    History of tobacco abuse   Carpal tunnel syndrome, bilateral    CKD, recent baseline 1.5-2.0. Stable. HPI:  65 yo male with HFimpEF with predominant HFpEF now with multiple admits for volume overload. He is on high dose bumex 5 mg BID as outpt with prn metolazone. His morbid obesity complicates his treatment with BMI 46. Weight on admission up to 349 lbs (hospital records show weight around 314 lbs during an admit in 2022). Reported more SOB and more edema in legs - metolazone not of help as outpt. Has CardioMems, goal PAD 15. PAD 16 10/6, 14 10/5. Was diuresed. Had repeat RHC for the purpose of Cardiomems calibration. RHC hemodynamics appeared to correlate with Cardiomems device. Also seen by pulmonology for optimization of his asthma regimen. Patient was sent home on escalated oral diuretic dosing. 10/26/23. Presents today for hospital follow up. Missed originally scheduled appointment on 10/17 due to being hospitalized for asthma exacerbation. . Given IV solumedrol and transitioned to oral prednisone. Pulmonology evaluated patient, they recommended hematology consult given significant eosinophilia which they are attributing to allergic/asthma picture. No concern for underlying hematological malignancy. Feels pretty good today. Weight stable. No complaints.    Past Medical History:   Diagnosis Date    Acute gastritis without hemorrhage     last assessed 3/24/17    Asthma     Atrial fibrillation ([address])     Bilateral leg edema 02/19/2020    CHF (congestive heart failure) (HCC)     Coronary artery disease     Daytime sleepiness 07/17/2019    Diabetes mellitus ([address])     Dyspnea on exertion 10/11/2021    Edema of both feet 01/23/2020    Gastric bypass status for obesity     Hyperlipidemia     Hypertension     Hypokalemia 11/14/2021    Impaired fasting glucose     last assessed 5/10/17    Knee sprain, bilateral 06/14/2018    Leg cramps 06/16/2022    Obesity     Viral gastroenteritis     last assessed 11/4/16       12 point ROS negative other than that stated in HPI    Allergies   Allergen Reactions    Azithromycin Other (See Comments)     Shaky, uneasy feeling     Bactrim [Sulfamethoxazole-Trimethoprim] Other (See Comments)     shakiness     . Current Outpatient Medications:     Accu-Chek FastClix Lancets MISC, Test blood sugar twice daily, Disp: 200 each, Rfl: 3    albuterol (PROVENTIL HFA,VENTOLIN HFA) 90 mcg/act inhaler, Inhale 2 puffs every 4 (four) hours as needed for wheezing or shortness of breath, Disp: 18 g, Rfl: 0    apixaban (Eliquis) 5 mg, Take 1 tablet (5 mg total) by mouth 2 (two) times a day, Disp: 60 tablet, Rfl: 5    atorvastatin (LIPITOR) 10 mg tablet, TAKE 1 TABLET BY MOUTH EVERY DAY, Disp: 90 tablet, Rfl: 2    Blood Glucose Monitoring Suppl (Accu-Chek Guide) w/Device KIT, Use 2 (two) times a day Test blood sugar twice daily, Disp: 1 kit, Rfl: 0    budesonide-formoterol (Symbicort) 160-4.5 mcg/act inhaler, Inhale 2 puffs 2 (two) times a day Rinse mouth after use., Disp: 30.6 g, Rfl: 2    bumetanide (BUMEX) 1 mg tablet, Take 6 tablets (6 mg total) by mouth 2 (two) times a day Do not start before October 13, 2023., Disp: 300 tablet, Rfl: 0    Empagliflozin (JARDIANCE) 10 MG TABS tablet, Take 1 tablet (10 mg total) by mouth daily, Disp: 30 tablet, Rfl: 11    EPINEPHrine (EPIPEN) 0.3 mg/0.3 mL SOAJ, Inject 0.3 mL (0.3 mg total) into a muscle once for 1 dose, Disp: 0.6 mL, Rfl: 0    Fasenra Pen 30 MG/ML SOAJ, , Disp: , Rfl:     fluticasone (FLONASE) 50 mcg/act nasal spray, 1 spray into each nostril 2 (two) times a day, Disp: , Rfl: 0    loratadine (CLARITIN) 10 mg tablet, Take 1 tablet (10 mg total) by mouth daily Do not start before October 13, 2023., Disp: , Rfl: 0    metolazone (ZAROXOLYN) 2.5 mg tablet, Take 20-30 minutes before Bumex dose and with additional potassium. Take for weight gain of 2-3 lbs in 24 hours, 5lbs in one week or signs of worsening fluid retention. , Disp: 90 tablet, Rfl: 0    metoprolol succinate (TOPROL-XL) 50 mg 24 hr tablet, Take 1 tablet (50 mg total) by mouth daily, Disp: 90 tablet, Rfl: 3    montelukast (SINGULAIR) 10 mg tablet, Take 1 tablet (10 mg total) by mouth daily at bedtime, Disp: 90 tablet, Rfl: 1    pantoprazole (PROTONIX) 40 mg tablet, Take 1 tablet (40 mg total) by mouth daily, Disp: 21 tablet, Rfl: 0    potassium chloride (K-DUR,KLOR-CON) 20 mEq tablet, Take 2 tablets (40 mEq total) by mouth 2 (two) times a day, Disp: 120 tablet, Rfl: 0    predniSONE 10 mg tablet, Take 4 tablets (40 mg total) by mouth daily for 1 day, THEN 2 tablets (20 mg total) daily for 3 days, THEN 1 tablet (10 mg total) daily for 3 days. Do not start before 2023., Disp: 13 tablet, Rfl: 0    pregabalin (LYRICA) 50 mg capsule, Take 1 caps. at bedtime, Disp: 30 capsule, Rfl: 5    sitaGLIPtin (Januvia) 100 mg tablet, TAKE 1/2 TABLET DAILY, Disp: 15 tablet, Rfl: 5    Spiriva Respimat 1.25 MCG/ACT AERS inhaler, INHALE 2 PUFFS BY MOUTH EVERY DAY, Disp: 4 g, Rfl: 5    spironolactone (ALDACTONE) 25 mg tablet, Take 1 tablet (25 mg total) by mouth daily, Disp: 30 tablet, Rfl: 5    Social History     Socioeconomic History    Marital status: /Civil Union     Spouse name: Not on file    Number of children: Not on file    Years of education: Not on file    Highest education level: Not on file   Occupational History    Not on file   Tobacco Use    Smoking status: Former     Types: Pipe, Cigars     Start date:      Quit date: 2021     Years since quittin.8    Smokeless tobacco: Never    Tobacco comments:     cigar once a day   Vaping Use    Vaping Use: Never used   Substance and Sexual Activity    Alcohol use:  Yes     Alcohol/week: 2.0 standard drinks of alcohol     Types: 2 Shots of liquor per week     Comment: social    Drug use: No    Sexual activity: Yes     Partners: Female     Birth control/protection: None   Other Topics Concern    Not on file   Social History Narrative    Not on file     Social Determinants of Health     Financial Resource Strain: Not on file   Food Insecurity: No Food Insecurity (10/17/2023)    Hunger Vital Sign     Worried About Running Out of Food in the Last Year: Never true     Ran Out of Food in the Last Year: Never true   Transportation Needs: No Transportation Needs (10/17/2023)    PRAPARE - Transportation     Lack of Transportation (Medical): No     Lack of Transportation (Non-Medical):  No   Physical Activity: Not on file   Stress: Not on file   Social Connections: Not on file   Intimate Partner Violence: Not on file   Housing Stability: Low Risk  (10/17/2023)    Housing Stability Vital Sign     Unable to Pay for Housing in the Last Year: No     Number of Places Lived in the Last Year: 1     Unstable Housing in the Last Year: No       Family History   Problem Relation Age of Onset    Stroke Mother     Hypertension Father     Cancer Father     COPD Father        Physical Exam:    Vitals: /80 (BP Location: Left arm, Patient Position: Sitting, Cuff Size: Large)   Pulse 92   Ht 6' 1" (1.854 m)   Wt (!) 154 kg (338 lb 9.6 oz)   SpO2 100%   BMI 44.67 kg/m²     Wt Readings from Last 3 Encounters:   10/20/23 (!) 154 kg (338 lb 9.6 oz)   10/12/23 (!) 154 kg (340 lb 6.4 oz)   10/05/23 (!) 158 kg (349 lb)       GEN: Clara Starkey appears well, alert and oriented x 3, pleasant and cooperative   HEENT: pupils equal, round, and reactive to light; extraocular muscles intact  NECK: supple, no carotid bruits   HEART: regular rhythm, normal S1 and S2, no murmurs, clicks, gallops or rubs, JVP is  down  LUNGS: few wheezes at bases  ABDOMEN: normal bowel sounds, soft, no tenderness, no distention  EXTREMITIES: peripheral pulses normal; no clubbing, cyanosis, or edema  NEURO: no focal findings   SKIN: normal without suspicious lesions on exposed skin    Labs & Results:    Chemistry        Component Value Date/Time    K 4.1 10/20/2023 8445    CL 96 10/20/2023 0638    CO2 31 10/20/2023 0638    BUN 54 (H) 10/20/2023 0638    CREATININE 1.96 (H) 10/20/2023 0638        Component Value Date/Time    CALCIUM 8.2 (L) 10/20/2023 0638    ALKPHOS 94 10/16/2023 0807    AST 40 (H) 10/16/2023 0807    ALT 83 (H) 10/16/2023 0807        Lab Results   Component Value Date    WBC 10.87 (H) 10/20/2023    HGB 11.6 (L) 10/20/2023    HCT 37.5 10/20/2023    MCV 82 10/20/2023     10/20/2023     Lab Results   Component Value Date     (H) 10/17/2023      Lab Results   Component Value Date    LDLCALC 86 10/06/2023     Lab Results   Component Value Date    LAS6HHHALGQY 1.270 09/01/2022       EKG personally reviewed by RODRIGUE Coker. No results found for this visit on 10/27/23. Counseling / Coordination of Care  Total floor / unit time spent today 20 minutes. Greater than 50% of total time was spent with the patient and / or family counseling and / or coordination of care. A description of the counseling / coordination of care: 20. Thank you for the opportunity to participate in the care of this patient.     Sophia Rowe

## 2023-10-26 ENCOUNTER — TELEPHONE (OUTPATIENT)
Dept: HEMATOLOGY ONCOLOGY | Facility: CLINIC | Age: 58
End: 2023-10-26

## 2023-10-26 ENCOUNTER — TELEPHONE (OUTPATIENT)
Dept: CARDIOLOGY CLINIC | Facility: CLINIC | Age: 58
End: 2023-10-26

## 2023-10-26 DIAGNOSIS — I50.32 CHRONIC HEART FAILURE WITH PRESERVED EJECTION FRACTION (HCC): ICD-10-CM

## 2023-10-26 RX ORDER — METOPROLOL SUCCINATE 50 MG/1
50 TABLET, EXTENDED RELEASE ORAL DAILY
Qty: 90 TABLET | Refills: 3 | Status: SHIPPED | OUTPATIENT
Start: 2023-10-26

## 2023-10-26 NOTE — TELEPHONE ENCOUNTER
Appointment Schedule   Who are you speaking with? Patient   If it is not the patient, are they listed on an active communication consent form? N/A   Which provider is the appointment scheduled with? Dr. Joel Nevarez   At which location is the appointment scheduled for? [facility]   When is the appointment scheduled? Please list date and time 11/30/23 @ 9am   What is the reason for this appointment? HFU 20min   Did patient voice understanding of the details of this appointment? yes   Was the no show policy reviewed with patient?  Yes

## 2023-10-26 NOTE — TELEPHONE ENCOUNTER
Called pt for update post discharge. He states he feels pretty good. Wt is 338 lb, he states below his dry wt. Denies any CHF symptoms. Cardiomems goal is 15 and last 3 days he has been 21,19,22. He has an appt with [facility] RODRIGUE tomorrow morning. Taking:Bumex 6 mg bid              Metolazone 2.5 mg prn for wt gain               Potassium 40 meq bid              Aldactone 25 qd. Any changes?     Please advise

## 2023-10-27 ENCOUNTER — OFFICE VISIT (OUTPATIENT)
Dept: CARDIOLOGY CLINIC | Facility: CLINIC | Age: 58
End: 2023-10-27
Payer: COMMERCIAL

## 2023-10-27 VITALS
HEIGHT: 73 IN | OXYGEN SATURATION: 100 % | BODY MASS INDEX: 41.75 KG/M2 | DIASTOLIC BLOOD PRESSURE: 80 MMHG | HEART RATE: 92 BPM | SYSTOLIC BLOOD PRESSURE: 142 MMHG | WEIGHT: 315 LBS

## 2023-10-27 DIAGNOSIS — I50.30 DIASTOLIC HEART FAILURE, UNSPECIFIED HF CHRONICITY (HCC): Primary | ICD-10-CM

## 2023-10-27 PROCEDURE — 99215 OFFICE O/P EST HI 40 MIN: CPT | Performed by: NURSE PRACTITIONER

## 2023-10-27 NOTE — TELEPHONE ENCOUNTER
Per Hafsa Love at today's office visit,    Jardiance 10 mg  Lot#: [id number]  Exp.  Date: 11/25  Count Dispensed: 3

## 2023-10-27 NOTE — PATIENT INSTRUCTIONS
Weigh yourself daily  If you gain 3 lbs in one day or 5 lbs in one week, please call the office at [phone] and ask for a nurse or the heart failure nurse  Keep your sodium intake to <2 grams, (2000 mg) per day, and fluids <2 Liters (2000 ml) per day. This is around 6-7, 8 oz glasses of fluid per day    Continue medications as you are  Monitor symptoms/weight closely while taking the steroids  We will watch your Cardiomems closely.

## 2023-10-31 ENCOUNTER — TELEPHONE (OUTPATIENT)
Dept: CARDIOLOGY CLINIC | Facility: CLINIC | Age: 58
End: 2023-10-31

## 2023-10-31 NOTE — TELEPHONE ENCOUNTER
Returned call to patient. Phone rang & rang but unable to leave a message.  Called patient's wife, Dot Cabrera, & left a message for patient stating form was received, completed, & FAX' d & for pt to return call to office

## 2023-11-01 ENCOUNTER — CLINICAL SUPPORT (OUTPATIENT)
Dept: CARDIOLOGY CLINIC | Facility: CLINIC | Age: 58
End: 2023-11-01
Payer: COMMERCIAL

## 2023-11-01 DIAGNOSIS — I50.30 DIASTOLIC HEART FAILURE, UNSPECIFIED HF CHRONICITY (HCC): Primary | ICD-10-CM

## 2023-11-01 PROCEDURE — 93264 REM MNTR WRLS P-ART PRS SNR: CPT | Performed by: INTERNAL MEDICINE

## 2023-11-03 NOTE — PROGRESS NOTES
30 days of Cardiomems data reviewed, analyzed, and interpretation completed. Patient complied with weekly data transmissions. 30 days of Cardiomems data reviewed, analyzed, and interpretation completed. Patient complied with weekly data transmissions.

## 2023-11-06 DIAGNOSIS — I50.32 CHRONIC HEART FAILURE WITH PRESERVED EJECTION FRACTION (HFPEF) (HCC): ICD-10-CM

## 2023-11-06 DIAGNOSIS — I50.33 ACUTE ON CHRONIC DIASTOLIC HEART FAILURE (HCC): ICD-10-CM

## 2023-11-06 RX ORDER — BUMETANIDE 1 MG/1
6 TABLET ORAL 2 TIMES DAILY
Qty: 360 TABLET | Refills: 1 | Status: SHIPPED | OUTPATIENT
Start: 2023-11-06 | End: 2024-01-05

## 2023-11-06 RX ORDER — SPIRONOLACTONE 25 MG/1
25 TABLET ORAL DAILY
Qty: 90 TABLET | Refills: 2 | Status: SHIPPED | OUTPATIENT
Start: 2023-11-06

## 2023-11-07 ENCOUNTER — OFFICE VISIT (OUTPATIENT)
Dept: CARDIOLOGY CLINIC | Facility: CLINIC | Age: 58
End: 2023-11-07
Payer: COMMERCIAL

## 2023-11-07 VITALS
HEART RATE: 91 BPM | SYSTOLIC BLOOD PRESSURE: 122 MMHG | BODY MASS INDEX: 41.75 KG/M2 | HEIGHT: 73 IN | DIASTOLIC BLOOD PRESSURE: 70 MMHG | WEIGHT: 315 LBS | OXYGEN SATURATION: 97 %

## 2023-11-07 DIAGNOSIS — I10 PRIMARY HYPERTENSION: ICD-10-CM

## 2023-11-07 DIAGNOSIS — N18.32 STAGE 3B CHRONIC KIDNEY DISEASE (HCC): ICD-10-CM

## 2023-11-07 DIAGNOSIS — I48.0 PAROXYSMAL ATRIAL FIBRILLATION (HCC): ICD-10-CM

## 2023-11-07 DIAGNOSIS — I50.32 CHRONIC HEART FAILURE WITH PRESERVED EJECTION FRACTION (HCC): Primary | ICD-10-CM

## 2023-11-07 PROCEDURE — 99214 OFFICE O/P EST MOD 30 MIN: CPT | Performed by: PHYSICIAN ASSISTANT

## 2023-11-07 NOTE — PATIENT INSTRUCTIONS
Please weigh yourself every day (after emptying your bladder) and keep a detailed log of weights. Contact the Heart Failure program at [phone] if you gain 3+ lbs overnight or 5+ lbs in 5-7 days. Limit daily sodium/salt intake to 2000 mg daily to prevent fluid retention. Avoid canned foods, fast food/Chinese food, and processed meats (hot dogs, lunch meat, and sausage etc.). Caution with condiments. Limit fluid intake to 2000 mL or 2 liters (about 60-65 ounces) daily. Avoid electrolyte replacement drinks (such as Gatorade, Pedialyte, Propel, Liquid IV, etc.). Bring complete list of medications and log of daily weights to your follow-up appointment.

## 2023-11-20 DIAGNOSIS — I50.33 ACUTE ON CHRONIC DIASTOLIC HEART FAILURE (HCC): ICD-10-CM

## 2023-11-20 DIAGNOSIS — I50.32 CHRONIC HEART FAILURE WITH PRESERVED EJECTION FRACTION (HCC): Primary | ICD-10-CM

## 2023-11-21 LAB — MISCELLANEOUS LAB TEST RESULT: NORMAL

## 2023-11-22 ENCOUNTER — TELEPHONE (OUTPATIENT)
Dept: CARDIOLOGY CLINIC | Facility: CLINIC | Age: 58
End: 2023-11-22

## 2023-11-22 RX ORDER — BUMETANIDE 2 MG/1
6 TABLET ORAL 2 TIMES DAILY
Qty: 540 TABLET | Refills: 1 | Status: SHIPPED | OUTPATIENT
Start: 2023-11-22 | End: 2024-05-20

## 2023-11-22 NOTE — TELEPHONE ENCOUNTER
Patient had called asking for a returned call from [facility]. Returned call to patient & left a message to return call to office.

## 2023-11-24 DIAGNOSIS — E78.2 MIXED HYPERLIPIDEMIA: ICD-10-CM

## 2023-11-24 RX ORDER — ATORVASTATIN CALCIUM 10 MG/1
TABLET, FILM COATED ORAL
Qty: 30 TABLET | Refills: 8 | Status: SHIPPED | OUTPATIENT
Start: 2023-11-24

## 2023-11-27 ENCOUNTER — HOSPITAL ENCOUNTER (INPATIENT)
Facility: HOSPITAL | Age: 58
LOS: 1 days | Discharge: HOME | DRG: 202 | End: 2023-11-28
Attending: EMERGENCY MEDICINE | Admitting: FAMILY MEDICINE
Payer: COMMERCIAL

## 2023-11-27 ENCOUNTER — APPOINTMENT (EMERGENCY)
Dept: RADIOLOGY | Facility: HOSPITAL | Age: 58
DRG: 202 | End: 2023-11-27
Payer: COMMERCIAL

## 2023-11-27 DIAGNOSIS — J45.20 MILD INTERMITTENT ASTHMA WITHOUT COMPLICATION: ICD-10-CM

## 2023-11-27 DIAGNOSIS — J45.901 ASTHMA EXACERBATION: Primary | ICD-10-CM

## 2023-11-27 DIAGNOSIS — J45.51 SEVERE PERSISTENT ASTHMA WITH ACUTE EXACERBATION: ICD-10-CM

## 2023-11-27 DIAGNOSIS — I50.9 HEART FAILURE (HCC): ICD-10-CM

## 2023-11-27 PROBLEM — E11.42 TYPE 2 DIABETES MELLITUS WITH DIABETIC POLYNEUROPATHY, WITHOUT LONG-TERM CURRENT USE OF INSULIN (HCC): Status: RESOLVED | Noted: 2018-01-30 | Resolved: 2023-11-27

## 2023-11-27 LAB
2HR DELTA HS TROPONIN: -3 NG/L
4HR DELTA HS TROPONIN: 35 NG/L
ALBUMIN SERPL BCP-MCNC: 3.6 G/DL (ref 3.5–5)
ALP SERPL-CCNC: 185 U/L (ref 34–104)
ALT SERPL W P-5'-P-CCNC: 48 U/L (ref 7–52)
ANION GAP SERPL CALCULATED.3IONS-SCNC: 16 MMOL/L
AST SERPL W P-5'-P-CCNC: 106 U/L (ref 13–39)
ATRIAL RATE: 119 BPM
BASOPHILS # BLD MANUAL: 0 THOUSAND/UL (ref 0–0.1)
BASOPHILS NFR MAR MANUAL: 0 % (ref 0–1)
BILIRUB SERPL-MCNC: 1.41 MG/DL (ref 0.2–1)
BNP SERPL-MCNC: 169 PG/ML (ref 0–100)
BUN SERPL-MCNC: 18 MG/DL (ref 5–25)
CALCIUM SERPL-MCNC: 7.5 MG/DL (ref 8.4–10.2)
CARDIAC TROPONIN I PNL SERPL HS: 157 NG/L
CARDIAC TROPONIN I PNL SERPL HS: 160 NG/L
CARDIAC TROPONIN I PNL SERPL HS: 195 NG/L
CHLORIDE SERPL-SCNC: 91 MMOL/L (ref 96–108)
CO2 SERPL-SCNC: 20 MMOL/L (ref 21–32)
CREAT SERPL-MCNC: 1.94 MG/DL (ref 0.6–1.3)
D DIMER PPP FEU-MCNC: 1.55 UG/ML FEU
EOSINOPHIL # BLD MANUAL: 0 THOUSAND/UL (ref 0–0.4)
EOSINOPHIL NFR BLD MANUAL: 0 % (ref 0–6)
ERYTHROCYTE [DISTWIDTH] IN BLOOD BY AUTOMATED COUNT: 18.4 % (ref 11.6–15.1)
FLUAV RNA RESP QL NAA+PROBE: NEGATIVE
FLUBV RNA RESP QL NAA+PROBE: NEGATIVE
GFR SERPL CREATININE-BSD FRML MDRD: 37 ML/MIN/1.73SQ M
GLUCOSE SERPL-MCNC: 276 MG/DL (ref 65–140)
GLUCOSE SERPL-MCNC: 478 MG/DL (ref 65–140)
GLUCOSE SERPL-MCNC: 487 MG/DL (ref 65–140)
HCT VFR BLD AUTO: 35.3 % (ref 36.5–49.3)
HGB BLD-MCNC: 10.9 G/DL (ref 12–17)
LYMPHOCYTES # BLD AUTO: 0 % (ref 14–44)
LYMPHOCYTES # BLD AUTO: 0 THOUSAND/UL (ref 0.6–4.47)
MCH RBC QN AUTO: 26 PG (ref 26.8–34.3)
MCHC RBC AUTO-ENTMCNC: 30.9 G/DL (ref 31.4–37.4)
MCV RBC AUTO: 84 FL (ref 82–98)
METAMYELOCYTES NFR BLD MANUAL: 2 % (ref 0–1)
MONOCYTES # BLD AUTO: 0.25 THOUSAND/UL (ref 0–1.22)
MONOCYTES NFR BLD: 2 % (ref 4–12)
NEUTROPHILS # BLD MANUAL: 11.84 THOUSAND/UL (ref 1.85–7.62)
NEUTS BAND NFR BLD MANUAL: 19 % (ref 0–8)
NEUTS SEG NFR BLD AUTO: 77 % (ref 43–75)
P AXIS: 7 DEGREES
PLATELET # BLD AUTO: 212 THOUSANDS/UL (ref 149–390)
PLATELET BLD QL SMEAR: ADEQUATE
PMV BLD AUTO: 10.7 FL (ref 8.9–12.7)
POTASSIUM SERPL-SCNC: 4.5 MMOL/L (ref 3.5–5.3)
PR INTERVAL: 130 MS
PROT SERPL-MCNC: 6 G/DL (ref 6.4–8.4)
QRS AXIS: 89 DEGREES
QRSD INTERVAL: 74 MS
QT INTERVAL: 296 MS
QTC INTERVAL: 416 MS
RBC # BLD AUTO: 4.2 MILLION/UL (ref 3.88–5.62)
RSV RNA RESP QL NAA+PROBE: NEGATIVE
SARS-COV-2 RNA RESP QL NAA+PROBE: NEGATIVE
SODIUM SERPL-SCNC: 127 MMOL/L (ref 135–147)
T WAVE AXIS: 48 DEGREES
VENTRICULAR RATE: 119 BPM
WBC # BLD AUTO: 12.33 THOUSAND/UL (ref 4.31–10.16)

## 2023-11-27 PROCEDURE — 85007 BL SMEAR W/DIFF WBC COUNT: CPT | Performed by: STUDENT IN AN ORGANIZED HEALTH CARE EDUCATION/TRAINING PROGRAM

## 2023-11-27 PROCEDURE — 94664 DEMO&/EVAL PT USE INHALER: CPT

## 2023-11-27 PROCEDURE — 99285 EMERGENCY DEPT VISIT HI MDM: CPT

## 2023-11-27 PROCEDURE — 83880 ASSAY OF NATRIURETIC PEPTIDE: CPT | Performed by: STUDENT IN AN ORGANIZED HEALTH CARE EDUCATION/TRAINING PROGRAM

## 2023-11-27 PROCEDURE — 96375 TX/PRO/DX INJ NEW DRUG ADDON: CPT

## 2023-11-27 PROCEDURE — 93005 ELECTROCARDIOGRAM TRACING: CPT

## 2023-11-27 PROCEDURE — 85379 FIBRIN DEGRADATION QUANT: CPT | Performed by: STUDENT IN AN ORGANIZED HEALTH CARE EDUCATION/TRAINING PROGRAM

## 2023-11-27 PROCEDURE — 80053 COMPREHEN METABOLIC PANEL: CPT | Performed by: STUDENT IN AN ORGANIZED HEALTH CARE EDUCATION/TRAINING PROGRAM

## 2023-11-27 PROCEDURE — 71275 CT ANGIOGRAPHY CHEST: CPT

## 2023-11-27 PROCEDURE — 84484 ASSAY OF TROPONIN QUANT: CPT | Performed by: FAMILY MEDICINE

## 2023-11-27 PROCEDURE — 82948 REAGENT STRIP/BLOOD GLUCOSE: CPT

## 2023-11-27 PROCEDURE — 85027 COMPLETE CBC AUTOMATED: CPT | Performed by: STUDENT IN AN ORGANIZED HEALTH CARE EDUCATION/TRAINING PROGRAM

## 2023-11-27 PROCEDURE — 71045 X-RAY EXAM CHEST 1 VIEW: CPT

## 2023-11-27 PROCEDURE — 94760 N-INVAS EAR/PLS OXIMETRY 1: CPT

## 2023-11-27 PROCEDURE — 0241U HB NFCT DS VIR RESP RNA 4 TRGT: CPT | Performed by: STUDENT IN AN ORGANIZED HEALTH CARE EDUCATION/TRAINING PROGRAM

## 2023-11-27 PROCEDURE — 84484 ASSAY OF TROPONIN QUANT: CPT | Performed by: STUDENT IN AN ORGANIZED HEALTH CARE EDUCATION/TRAINING PROGRAM

## 2023-11-27 PROCEDURE — 94644 CONT INHLJ TX 1ST HOUR: CPT

## 2023-11-27 PROCEDURE — 94640 AIRWAY INHALATION TREATMENT: CPT

## 2023-11-27 PROCEDURE — 99223 1ST HOSP IP/OBS HIGH 75: CPT | Performed by: FAMILY MEDICINE

## 2023-11-27 PROCEDURE — 36415 COLL VENOUS BLD VENIPUNCTURE: CPT | Performed by: STUDENT IN AN ORGANIZED HEALTH CARE EDUCATION/TRAINING PROGRAM

## 2023-11-27 PROCEDURE — 96365 THER/PROPH/DIAG IV INF INIT: CPT

## 2023-11-27 PROCEDURE — G1004 CDSM NDSC: HCPCS

## 2023-11-27 RX ORDER — METHYLPREDNISOLONE SODIUM SUCCINATE 40 MG/ML
40 INJECTION, POWDER, LYOPHILIZED, FOR SOLUTION INTRAMUSCULAR; INTRAVENOUS EVERY 8 HOURS SCHEDULED
Status: DISCONTINUED | OUTPATIENT
Start: 2023-11-27 | End: 2023-11-28

## 2023-11-27 RX ORDER — BUDESONIDE AND FORMOTEROL FUMARATE DIHYDRATE 160; 4.5 UG/1; UG/1
2 AEROSOL RESPIRATORY (INHALATION) 2 TIMES DAILY
Status: DISCONTINUED | OUTPATIENT
Start: 2023-11-27 | End: 2023-11-28 | Stop reason: HOSPADM

## 2023-11-27 RX ORDER — METHYLPREDNISOLONE SODIUM SUCCINATE 125 MG/2ML
125 INJECTION, POWDER, LYOPHILIZED, FOR SOLUTION INTRAMUSCULAR; INTRAVENOUS ONCE
Status: COMPLETED | OUTPATIENT
Start: 2023-11-27 | End: 2023-11-27

## 2023-11-27 RX ORDER — SODIUM CHLORIDE FOR INHALATION 0.9 %
12 VIAL, NEBULIZER (ML) INHALATION ONCE
Status: COMPLETED | OUTPATIENT
Start: 2023-11-27 | End: 2023-11-27

## 2023-11-27 RX ORDER — PREGABALIN 50 MG/1
50 CAPSULE ORAL
Status: DISCONTINUED | OUTPATIENT
Start: 2023-11-27 | End: 2023-11-28 | Stop reason: HOSPADM

## 2023-11-27 RX ORDER — ALBUTEROL SULFATE 2.5 MG/3ML
1 SOLUTION RESPIRATORY (INHALATION) ONCE
Status: COMPLETED | OUTPATIENT
Start: 2023-11-27 | End: 2023-11-27

## 2023-11-27 RX ORDER — PANTOPRAZOLE SODIUM 40 MG/1
40 TABLET, DELAYED RELEASE ORAL
Status: DISCONTINUED | OUTPATIENT
Start: 2023-11-28 | End: 2023-11-28 | Stop reason: HOSPADM

## 2023-11-27 RX ORDER — LEVALBUTEROL INHALATION SOLUTION 1.25 MG/3ML
1.25 SOLUTION RESPIRATORY (INHALATION)
Status: DISCONTINUED | OUTPATIENT
Start: 2023-11-27 | End: 2023-11-28 | Stop reason: HOSPADM

## 2023-11-27 RX ORDER — POTASSIUM CHLORIDE 20 MEQ/1
40 TABLET, EXTENDED RELEASE ORAL 2 TIMES DAILY
Status: DISCONTINUED | OUTPATIENT
Start: 2023-11-27 | End: 2023-11-28 | Stop reason: HOSPADM

## 2023-11-27 RX ORDER — ACETAMINOPHEN 325 MG/1
975 TABLET ORAL EVERY 8 HOURS PRN
Status: DISCONTINUED | OUTPATIENT
Start: 2023-11-27 | End: 2023-11-28 | Stop reason: HOSPADM

## 2023-11-27 RX ORDER — ATORVASTATIN CALCIUM 10 MG/1
10 TABLET, FILM COATED ORAL DAILY
Status: DISCONTINUED | OUTPATIENT
Start: 2023-11-27 | End: 2023-11-28 | Stop reason: HOSPADM

## 2023-11-27 RX ORDER — BUMETANIDE 2 MG/1
6 TABLET ORAL 2 TIMES DAILY
Status: DISCONTINUED | OUTPATIENT
Start: 2023-11-27 | End: 2023-11-28

## 2023-11-27 RX ORDER — INSULIN LISPRO 100 [IU]/ML
2-12 INJECTION, SOLUTION INTRAVENOUS; SUBCUTANEOUS
Status: DISCONTINUED | OUTPATIENT
Start: 2023-11-27 | End: 2023-11-28 | Stop reason: HOSPADM

## 2023-11-27 RX ORDER — INSULIN LISPRO 100 [IU]/ML
1-5 INJECTION, SOLUTION INTRAVENOUS; SUBCUTANEOUS
Status: DISCONTINUED | OUTPATIENT
Start: 2023-11-27 | End: 2023-11-28 | Stop reason: HOSPADM

## 2023-11-27 RX ORDER — SPIRONOLACTONE 25 MG/1
25 TABLET ORAL DAILY
Status: DISCONTINUED | OUTPATIENT
Start: 2023-11-27 | End: 2023-11-28 | Stop reason: HOSPADM

## 2023-11-27 RX ORDER — MAGNESIUM SULFATE HEPTAHYDRATE 40 MG/ML
2 INJECTION, SOLUTION INTRAVENOUS ONCE
Status: COMPLETED | OUTPATIENT
Start: 2023-11-27 | End: 2023-11-27

## 2023-11-27 RX ORDER — METOPROLOL SUCCINATE 50 MG/1
50 TABLET, EXTENDED RELEASE ORAL DAILY
Status: DISCONTINUED | OUTPATIENT
Start: 2023-11-27 | End: 2023-11-28 | Stop reason: HOSPADM

## 2023-11-27 RX ORDER — SODIUM CHLORIDE FOR INHALATION 0.9 %
3 VIAL, NEBULIZER (ML) INHALATION
Status: DISCONTINUED | OUTPATIENT
Start: 2023-11-27 | End: 2023-11-28

## 2023-11-27 RX ADMIN — BUMETANIDE 6 MG: 2 TABLET ORAL at 17:00

## 2023-11-27 RX ADMIN — Medication 3 ML: at 20:51

## 2023-11-27 RX ADMIN — METHYLPREDNISOLONE SODIUM SUCCINATE 125 MG: 125 INJECTION, POWDER, FOR SOLUTION INTRAMUSCULAR; INTRAVENOUS at 10:56

## 2023-11-27 RX ADMIN — METHYLPREDNISOLONE SODIUM SUCCINATE 40 MG: 40 INJECTION, POWDER, FOR SOLUTION INTRAMUSCULAR; INTRAVENOUS at 17:00

## 2023-11-27 RX ADMIN — Medication 12 ML: at 10:56

## 2023-11-27 RX ADMIN — ALBUTEROL SULFATE 10 MG: 2.5 SOLUTION RESPIRATORY (INHALATION) at 10:56

## 2023-11-27 RX ADMIN — UMECLIDINIUM 1 PUFF: 62.5 AEROSOL, POWDER ORAL at 17:43

## 2023-11-27 RX ADMIN — ACETAMINOPHEN 975 MG: 325 TABLET, FILM COATED ORAL at 14:07

## 2023-11-27 RX ADMIN — METHYLPREDNISOLONE SODIUM SUCCINATE 40 MG: 40 INJECTION, POWDER, FOR SOLUTION INTRAMUSCULAR; INTRAVENOUS at 22:16

## 2023-11-27 RX ADMIN — MAGNESIUM SULFATE HEPTAHYDRATE 2 G: 40 INJECTION, SOLUTION INTRAVENOUS at 11:07

## 2023-11-27 RX ADMIN — POTASSIUM CHLORIDE 40 MEQ: 1500 TABLET, EXTENDED RELEASE ORAL at 17:00

## 2023-11-27 RX ADMIN — PREGABALIN 50 MG: 50 CAPSULE ORAL at 22:16

## 2023-11-27 RX ADMIN — Medication 3 ML: at 17:00

## 2023-11-27 RX ADMIN — APIXABAN 5 MG: 5 TABLET, FILM COATED ORAL at 17:00

## 2023-11-27 RX ADMIN — IPRATROPIUM BROMIDE 1 MG: 0.5 SOLUTION RESPIRATORY (INHALATION) at 10:56

## 2023-11-27 RX ADMIN — LEVALBUTEROL HYDROCHLORIDE 1.25 MG: 1.25 SOLUTION RESPIRATORY (INHALATION) at 17:01

## 2023-11-27 RX ADMIN — IOHEXOL 100 ML: 350 INJECTION, SOLUTION INTRAVENOUS at 12:15

## 2023-11-27 RX ADMIN — BUDESONIDE AND FORMOTEROL FUMARATE DIHYDRATE 2 PUFF: 160; 4.5 AEROSOL RESPIRATORY (INHALATION) at 17:43

## 2023-11-27 RX ADMIN — INSULIN LISPRO 12 UNITS: 100 INJECTION, SOLUTION INTRAVENOUS; SUBCUTANEOUS at 17:01

## 2023-11-27 RX ADMIN — LEVALBUTEROL HYDROCHLORIDE 1.25 MG: 1.25 SOLUTION RESPIRATORY (INHALATION) at 20:51

## 2023-11-27 NOTE — ASSESSMENT & PLAN NOTE
Patient had shortness of breath for the past 3 days  Patient was given serrano-steroids-magnesium  Patient feels better at after the treatment but still has shortness of breath  Continuous pulse ox monitoring  Supplemental oxygen if patient desaturates below 89, patient currently saturating well on RA  Solu-Medrol 40 mg q8hrs  DuoNeb treatment  Respiratory protocol  There is not a strong suspicion of infection, patient has no sick contacts, patient is not having any cough or upper respiratory symptoms.   Will hold off on antibiotic therapy at this time

## 2023-11-27 NOTE — ASSESSMENT & PLAN NOTE
Lab Results   Component Value Date    HGBA1C 7.1 (A) 08/09/2023       No results for input(s): "POCGLU" in the last 72 hours.     Blood Sugar Average: Last 72 hrs:    Patient takes Jardiance 10 mg daily as well as Januvia 50 mg daily  Patient received contrast for his CTA and has CKD  Will hold Januvia  Continue Jardiance as it is also helping with his CHF  insulin sliding scale

## 2023-11-27 NOTE — ASSESSMENT & PLAN NOTE
Blood pressure at goal  Will continue Bumex 6 mg twice daily, metoprolol 50 mg daily, spironolactone 25 mg daily

## 2023-11-27 NOTE — ASSESSMENT & PLAN NOTE
Patient rate controlled with metoprolol succinate 50 mg daily patient anticoagulated with Eliquis 5 mg twice daily  Patient is in sinus tachycardia

## 2023-11-27 NOTE — H&P (VIEW-ONLY)
[address] [facility]  H&P  Name: Tera Castillo 66 y.o. male I MRN: [id number]  Unit/Bed#: ED 17 I Date of Admission: 11/27/2023   Date of Service: 11/27/2023 I Hospital Day: 0      Assessment/Plan   * Severe asthma with acute exacerbation  Assessment & Plan  Patient had shortness of breath for the past 3 days  Patient was given serrano-steroids-magnesium  Patient feels better at after the treatment but still has shortness of breath  Continuous pulse ox monitoring  Supplemental oxygen if patient desaturates below 89, patient currently saturating well on RA  Solu-Medrol 40 mg q8hrs  DuoNeb treatment  Respiratory protocol  There is not a strong suspicion of infection, patient has no sick contacts, patient is not having any cough or upper respiratory symptoms. Will hold off on antibiotic therapy at this time      Anemia of chronic disease  Assessment & Plan  Patient is at his baseline hemoglobin of 10  Will continue to monitor  A.m. CBC  Transfuse below 7    Chronic diastolic CHF [facility])  Assessment & Plan  Wt Readings from Last 3 Encounters:   11/07/23 (!) 152 kg (335 lb 12.8 oz)   10/27/23 (!) 154 kg (338 lb 9.6 oz)   10/20/23 (!) 154 kg (338 lb 9.6 oz)   Patient had an echo done on April 2023 with an EF of 50%  Will continue Toprol 50 mg daily  Will continue Jardiance 10 mg daily  Continue Bumex 6 mg twice daily as well as spironolactone 25 mg  Monitor daily weights and strict intake and output  Patient appears to be euvolemic at this time          Diabetic polyneuropathy associated with type 2 diabetes mellitus ([address] )  Assessment & Plan  Lab Results   Component Value Date    HGBA1C 7.1 (A) 08/09/2023       No results for input(s): "POCGLU" in the last 72 hours.     Blood Sugar Average: Last 72 hrs:    Patient takes Jardiance 10 mg daily as well as Januvia 50 mg daily  Patient received contrast for his CTA and has CKD  Will hold Fernanda Nix as it is also helping with his CHF  insulin sliding scale    CKD (chronic kidney disease) stage 3 [facility])  Assessment & Plan  Lab Results   Component Value Date    EGFR 37 11/27/2023    EGFR 37 10/20/2023    EGFR 36 10/19/2023    CREATININE 1.94 (H) 11/27/2023    CREATININE 1.96 (H) 10/20/2023    CREATININE 2.00 (H) 10/19/2023   Patient appears to be at his baseline  Will avoid nephrotoxic medication if possible  Patient noted to be on Bumex as well as spironolactone  Continue monitoring with CMP/BMP    Paroxysmal atrial fibrillation ([address])  Assessment & Plan  Patient rate controlled with metoprolol succinate 50 mg daily patient anticoagulated with Eliquis 5 mg twice daily  Patient is in sinus tachycardia    Morbid obesity ([address])  Assessment & Plan  Call patient on lifestyle modification  Will place patient on diabetic diet    Hyperlipidemia  Assessment & Plan  Patient on 10 mg of atorvastatin  Will continue    Primary hypertension  Assessment & Plan  Blood pressure at goal  Will continue Bumex 6 mg twice daily, metoprolol 50 mg daily, spironolactone 25 mg daily         VTE Pharmacologic Prophylaxis: VTE Score: 3 Moderate Risk (Score 3-4) - Pharmacological DVT Prophylaxis Ordered: apixaban (Eliquis). Code Status: Prior Full code  Discussion with family:  Patient updated wife and daughter. Anticipated Length of Stay: Patient will be admitted on an inpatient basis with an anticipated length of stay of greater than 2 midnights secondary to asthma exacerbation. Total Time Spent on Date of Encounter in care of patient: 45 mins. This time was spent on one or more of the following: performing physical exam; counseling and coordination of care; obtaining or reviewing history; documenting in the medical record; reviewing/ordering tests, medications or procedures; communicating with other healthcare professionals and discussing with patient's family/caregivers.     Chief Complaint: Shortness of breath    History of Present Illness:  Thiago Cherry is a 66 y.o. male with a PMH of hyperlipidemia, morbid obesity, paroxysmal atrial fibrillation, hypertension, CKD stage III, diabetes, CHF, anemia, severe persistent asthma who presents with shortness of breath for the past few days gradually getting worse. Patient has tried to use his inhalers without any relief. In the emergency department patient was given Solu-Medrol, multiple breathing treatments, and patient states that he feels a little bit better however still visibly tachypneic and having significant increased work of breathing. No further complaints at this time. Patient denies any chest pain nausea vomiting fever chills or sick contacts. Review of Systems:  Review of Systems   Constitutional:  Negative for chills and fever. HENT:  Negative for congestion and sore throat. Eyes:  Negative for visual disturbance. Respiratory:  Positive for shortness of breath and wheezing. Cardiovascular:  Negative for chest pain and palpitations. Gastrointestinal:  Negative for nausea and vomiting. Endocrine: Negative for polydipsia and polyphagia. Genitourinary:  Negative for dysuria and penile discharge. Musculoskeletal:  Negative for arthralgias and myalgias. Skin:  Negative for rash and wound. Neurological:  Negative for syncope and facial asymmetry. Psychiatric/Behavioral:  Negative for agitation.         Past Medical and Surgical History:   Past Medical History:   Diagnosis Date    Acute gastritis without hemorrhage     last assessed 3/24/17    Asthma     Atrial fibrillation ([address])     Bilateral leg edema 02/19/2020    CHF (congestive heart failure) (HCC)     Coronary artery disease     Daytime sleepiness 07/17/2019    Diabetes mellitus ([address])     Dyspnea on exertion 10/11/2021    Edema of both feet 01/23/2020    Gastric bypass status for obesity     Hyperlipidemia     Hypertension     Hypokalemia 11/14/2021    Impaired fasting glucose     last assessed 5/10/17    Knee sprain, bilateral 06/14/2018    Leg cramps 06/16/2022    Obesity     Viral gastroenteritis     last assessed 11/4/16       Past Surgical History:   Procedure Laterality Date    CARDIAC CATHETERIZATION N/A 3/9/2022    Procedure: CARDIAC RHC W/ PRESSURE SENSOR;  Surgeon: Bianka Park MD;  Location: [facility] CARD[facility]AC [facility]; Service: Cardiology    CARDIAC CATHETERIZATION N/A 9/6/2022    Procedure: Cardiac catheterization;  Surgeon: Thiago Sanchez DO;  Location: [facility]; Service: Cardiology    CARDIAC CATHETERIZATION N/A 9/6/2022    Procedure: Cardiac Coronary Angiogram;  Surgeon: Thiago Sanchez DO;  Location: [facility]; Service: Cardiology    CARDIAC CATHETERIZATION N/A 10/11/2023    Procedure: Cardiac RHC; Surgeon: Troy Basilio MD;  Location: [facility] CARD[facility]AC [facility]; Service: Cardiology    CARPAL TUNNEL RELEASE      bilateral    GASTRIC BYPASS  2004    with han en y    HERNIA REPAIR      ventral inscisional    TONSILLECTOMY         Meds/Allergies:  Prior to Admission medications    Medication Sig Start Date End Date Taking? Authorizing Provider   Accu-Chek FastClix Lancets MISC Test blood sugar twice daily 11/16/21   Diane Chandler MD   albuterol (PROVENTIL HFA,VENTOLIN HFA) 90 mcg/act inhaler Inhale 2 puffs every 4 (four) hours as needed for wheezing or shortness of breath 10/12/23   RODRIGUE Costa   apixaban (Eliquis) 5 mg Take 1 tablet (5 mg total) by mouth 2 (two) times a day 10/24/23   Rosanne Brumfield MD   atorvastatin (LIPITOR) 10 mg tablet TAKE 1 TABLET BY MOUTH EVERY DAY 11/24/23   Rosanne Brumfield MD   Blood Glucose Monitoring Suppl (Accu-Chek Guide) w/Device KIT Use 2 (two) times a day Test blood sugar twice daily 8/5/21   Diane Chandler MD   budesonide-formoterol (Symbicort) 160-4.5 mcg/act inhaler Inhale 2 puffs 2 (two) times a day Rinse mouth after use.  11/29/22   Diane Chandler MD   bumetanide (BUMEX) 2 mg tablet Take 3 tablets (6 mg total) by mouth 2 (two) times a day 11/22/23 5/20/24  Willie Faustin PA-C   Empagliflozin (JARDIANCE) 10 MG TABS tablet Take 1 tablet (10 mg total) by mouth daily 5/26/23   Fernanda Brandt MD   EPINEPHrine (EPIPEN) 0.3 mg/0.3 mL SOAJ Inject 0.3 mL (0.3 mg total) into a muscle once for 1 dose 2/21/23 11/7/23  Eric Gatica MD   Fasenra Pen 30 MG/ML SOAJ  3/24/23   Historical Provider, MD   fluticasone (FLONASE) 50 mcg/act nasal spray 1 spray into each nostril 2 (two) times a day 10/12/23   RODRIGUE Rushing   loratadine (CLARITIN) 10 mg tablet Take 1 tablet (10 mg total) by mouth daily Do not start before October 13, 2023. 10/13/23   RODRIGUE Rushing   metolazone (ZAROXOLYN) 2.5 mg tablet Take 20-30 minutes before Bumex dose and with additional potassium. Take for weight gain of 2-3 lbs in 24 hours, 5lbs in one week or signs of worsening fluid retention. 10/12/23   RODRIGUE Rodriges   metoprolol succinate (TOPROL-XL) 50 mg 24 hr tablet TAKE ONE (1) TABLET BY MOUTH DAILY 10/26/23   Fernanda Brandt MD   montelukast (SINGULAIR) 10 mg tablet Take 1 tablet (10 mg total) by mouth daily at bedtime 2/3/22 11/7/23  Sundeep Arndt MD   pantoprazole (PROTONIX) 40 mg tablet Take 1 tablet (40 mg total) by mouth daily 3/21/23   RODRIGUE Harley   potassium chloride (K-DUR,KLOR-CON) 20 mEq tablet Take 2 tablets (40 mEq total) by mouth 2 (two) times a day 7/28/23 11/7/23  Alna Marcial MD   pregabalin (LYRICA) 50 mg capsule Take 1 caps. at bedtime 7/6/23   Sundeep Arndt MD   sitaGLIPtin (Januvia) 100 mg tablet TAKE 1/2 TABLET DAILY 8/21/23   Sundeep Arndt MD   Spiriva Respimat 1.25 MCG/ACT AERS inhaler INHALE 2 PUFFS BY MOUTH EVERY DAY 2/5/23   Sundeep Ardnt MD   spironolactone (ALDACTONE) 25 mg tablet TAKE 1 TABLET (25 MG TOTAL) BY MOUTH DAILY. 11/6/23   Fernanda Brandt MD     I have reviewed home medications with patient personally. 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   Patient Pre-hospital Living Situation: Home  Patient Pre-hospital Level of Mobility: walks  Patient Pre-hospital Diet Restrictions: None  Substance Use History:   Social History     Substance and Sexual Activity   Alcohol Use Yes    Alcohol/week: 2.0 standard drinks of alcohol    Types: 2 Shots of liquor per week    Comment: social     Social History     Tobacco Use   Smoking Status Former    Types: Pipe, Cigars    Start date:     Quit date: 2021    Years since quittin.9   Smokeless Tobacco Never   Tobacco Comments    cigar once a day     Social History     Substance and Sexual Activity   Drug Use No       Family History:  Family History   Problem Relation Age of Onset    Stroke Mother     Hypertension Father     Cancer Father     COPD Father        Physical Exam:     Vitals:   Blood Pressure: 129/69 (23 1330)  Pulse: 98 (23 1330)  Temperature: 98.2 °F (36.8 °C) (23 1017)  Temp Source: Oral (23 1017)  Respirations: (!) 24 (23 1330)  SpO2: 96 % (23 1330)    Physical Exam  Vitals reviewed. Constitutional:       General: He is not in acute distress. Appearance: Normal appearance. He is not ill-appearing. HENT:      Head: Normocephalic and atraumatic. Right Ear: External ear normal.      Left Ear: External ear normal.      Nose: Nose normal.   Eyes:      Extraocular Movements: Extraocular movements intact. Conjunctiva/sclera: Conjunctivae normal.   Cardiovascular:      Rate and Rhythm: Normal rate and regular rhythm. Pulses: Normal pulses. Heart sounds: Normal heart sounds. Pulmonary:      Effort: Respiratory distress present. Breath sounds: Wheezing present.       Comments: Patient tachypneic  Chest:      Chest wall: No tenderness. Abdominal:      General: Abdomen is flat. Palpations: Abdomen is soft. Musculoskeletal:         General: Normal range of motion. Cervical back: Normal range of motion. Skin:     General: Skin is warm and dry. Neurological:      General: No focal deficit present. Mental Status: He is alert. Mental status is at baseline. Psychiatric:         Mood and Affect: Mood normal.         Behavior: Behavior normal.          Additional Data:     Lab Results:  Results from last 7 days   Lab Units 11/27/23  1056   WBC Thousand/uL 12.33*   HEMOGLOBIN g/dL 10.9*   HEMATOCRIT % 35.3*   PLATELETS Thousands/uL 212   BANDS PCT % 19*   LYMPHO PCT % 0*   MONO PCT % 2*   EOS PCT % 0     Results from last 7 days   Lab Units 11/27/23  1056   SODIUM mmol/L 127*   POTASSIUM mmol/L 4.5   CHLORIDE mmol/L 91*   CO2 mmol/L 20*   BUN mg/dL 18   CREATININE mg/dL 1.94*   ANION GAP mmol/L 16   CALCIUM mg/dL 7.5*   ALBUMIN g/dL 3.6   TOTAL BILIRUBIN mg/dL 1.41*   ALK PHOS U/L 185*   ALT U/L 48   AST U/L 106*   GLUCOSE RANDOM mg/dL 276*                       Lines/Drains:  Invasive Devices       Peripheral Intravenous Line  Duration             Peripheral IV 11/27/23 Left Antecubital <1 day                        Imaging: Reviewed radiology reports from this admission including: chest CT scan  CTA ED chest PE Study   Final Result by Derrell Dawson MD (11/27 7333)      No acute pulmonary emboli. No focal consolidation. A Cardio mems device in the left lower lobe segmental pulmonary artery. A small amount of fluid and debris within the lower thoracic esophagus, suggesting reflux. This puts the patient at increased risk for aspiration pneumonitis. The study was marked in [facility] for immediate notification. Workstation performed: [id number]         XR chest 1 view portable   Final Result by Cindy Leigh MD (11/27 3960)      No acute cardiopulmonary disease.                   Workstation performed: [id number]             EKG and Other Studies Reviewed on Admission:   EKG: Sinus Tachycardia. . ** Please Note: This note has been constructed using a voice recognition system.  **

## 2023-11-27 NOTE — ED ATTENDING ATTESTATION
11/27/2023  I, Wan Baker MD, saw and evaluated the patient. I have discussed the patient with the resident/non-physician practitioner and agree with the resident's/non-physician practitioner's findings, Plan of Care, and MDM as documented in the resident's/non-physician practitioner's note, except where noted. All available labs and Radiology studies were reviewed. I was present for key portions of any procedure(s) performed by the resident/non-physician practitioner and I was immediately available to provide assistance. At this point I agree with the current assessment done in the Emergency Department.   I have conducted an independent evaluation of this patient a history and physical is as follows:  H/o asthma  chf   c/o sob for a few days   No leg swelling   Using inhalers  No fever   no cough    Has  cp sharp right back chest pain  worse with a deep breath   Exam patient is in no acute distress HEENT exam unremarkable neck no JVD lungs with wheezing bilaterally decreased air movement heart is tachycardic no murmurs abdomen soft nontender extremities nontender impression COPD exacerbation versus asthma versus CHF less likely pulmonary embolism  ED Course   Patient required beta-2 agonist steroids magnesium  CTA negative for PE as patient is D-dimer quite elevated  Initial troponin approximately 150    Patient will be admitted for continued evaluation and treatment of his respiratory distress      Critical Care Time  Procedures  The patient presented with a condition in which there was a high probability of imminent or life-threatening deterioration, and critical care services (excluding separately billable procedures and total teaching time ) total 30 minutes

## 2023-11-27 NOTE — H&P
[address] [facility]  H&P  Name: Zoey Dewitt 66 y.o. male I MRN: [id number]  Unit/Bed#: ED 17 I Date of Admission: 11/27/2023   Date of Service: 11/27/2023 I Hospital Day: 0      Assessment/Plan   * Severe asthma with acute exacerbation  Assessment & Plan  Patient had shortness of breath for the past 3 days  Patient was given serrano-steroids-magnesium  Patient feels better at after the treatment but still has shortness of breath  Continuous pulse ox monitoring  Supplemental oxygen if patient desaturates below 89, patient currently saturating well on RA  Solu-Medrol 40 mg q8hrs  DuoNeb treatment  Respiratory protocol  There is not a strong suspicion of infection, patient has no sick contacts, patient is not having any cough or upper respiratory symptoms. Will hold off on antibiotic therapy at this time      Anemia of chronic disease  Assessment & Plan  Patient is at his baseline hemoglobin of 10  Will continue to monitor  A.m. CBC  Transfuse below 7    Chronic diastolic CHF [facility])  Assessment & Plan  Wt Readings from Last 3 Encounters:   11/07/23 (!) 152 kg (335 lb 12.8 oz)   10/27/23 (!) 154 kg (338 lb 9.6 oz)   10/20/23 (!) 154 kg (338 lb 9.6 oz)   Patient had an echo done on April 2023 with an EF of 50%  Will continue Toprol 50 mg daily  Will continue Jardiance 10 mg daily  Continue Bumex 6 mg twice daily as well as spironolactone 25 mg  Monitor daily weights and strict intake and output  Patient appears to be euvolemic at this time          Diabetic polyneuropathy associated with type 2 diabetes mellitus ([address] )  Assessment & Plan  Lab Results   Component Value Date    HGBA1C 7.1 (A) 08/09/2023       No results for input(s): "POCGLU" in the last 72 hours.     Blood Sugar Average: Last 72 hrs:    Patient takes Jardiance 10 mg daily as well as Januvia 50 mg daily  Patient received contrast for his CTA and has CKD  Will hold Keshia Powers as it is also helping with his CHF  insulin sliding scale    CKD (chronic kidney disease) stage 3 [facility])  Assessment & Plan  Lab Results   Component Value Date    EGFR 37 11/27/2023    EGFR 37 10/20/2023    EGFR 36 10/19/2023    CREATININE 1.94 (H) 11/27/2023    CREATININE 1.96 (H) 10/20/2023    CREATININE 2.00 (H) 10/19/2023   Patient appears to be at his baseline  Will avoid nephrotoxic medication if possible  Patient noted to be on Bumex as well as spironolactone  Continue monitoring with CMP/BMP    Paroxysmal atrial fibrillation ([address])  Assessment & Plan  Patient rate controlled with metoprolol succinate 50 mg daily patient anticoagulated with Eliquis 5 mg twice daily  Patient is in sinus tachycardia    Morbid obesity ([address])  Assessment & Plan  Call patient on lifestyle modification  Will place patient on diabetic diet    Hyperlipidemia  Assessment & Plan  Patient on 10 mg of atorvastatin  Will continue    Primary hypertension  Assessment & Plan  Blood pressure at goal  Will continue Bumex 6 mg twice daily, metoprolol 50 mg daily, spironolactone 25 mg daily         VTE Pharmacologic Prophylaxis: VTE Score: 3 Moderate Risk (Score 3-4) - Pharmacological DVT Prophylaxis Ordered: apixaban (Eliquis). Code Status: Prior Full code  Discussion with family:  Patient updated wife and daughter. Anticipated Length of Stay: Patient will be admitted on an inpatient basis with an anticipated length of stay of greater than 2 midnights secondary to asthma exacerbation. Total Time Spent on Date of Encounter in care of patient: 45 mins. This time was spent on one or more of the following: performing physical exam; counseling and coordination of care; obtaining or reviewing history; documenting in the medical record; reviewing/ordering tests, medications or procedures; communicating with other healthcare professionals and discussing with patient's family/caregivers.     Chief Complaint: Shortness of breath    History of Present Illness:  Blaine Nickerson is a 66 y.o. male with a PMH of hyperlipidemia, morbid obesity, paroxysmal atrial fibrillation, hypertension, CKD stage III, diabetes, CHF, anemia, severe persistent asthma who presents with shortness of breath for the past few days gradually getting worse. Patient has tried to use his inhalers without any relief. In the emergency department patient was given Solu-Medrol, multiple breathing treatments, and patient states that he feels a little bit better however still visibly tachypneic and having significant increased work of breathing. No further complaints at this time. Patient denies any chest pain nausea vomiting fever chills or sick contacts. Review of Systems:  Review of Systems   Constitutional:  Negative for chills and fever. HENT:  Negative for congestion and sore throat. Eyes:  Negative for visual disturbance. Respiratory:  Positive for shortness of breath and wheezing. Cardiovascular:  Negative for chest pain and palpitations. Gastrointestinal:  Negative for nausea and vomiting. Endocrine: Negative for polydipsia and polyphagia. Genitourinary:  Negative for dysuria and penile discharge. Musculoskeletal:  Negative for arthralgias and myalgias. Skin:  Negative for rash and wound. Neurological:  Negative for syncope and facial asymmetry. Psychiatric/Behavioral:  Negative for agitation.         Past Medical and Surgical History:   Past Medical History:   Diagnosis Date    Acute gastritis without hemorrhage     last assessed 3/24/17    Asthma     Atrial fibrillation ([address])     Bilateral leg edema 02/19/2020    CHF (congestive heart failure) (HCC)     Coronary artery disease     Daytime sleepiness 07/17/2019    Diabetes mellitus ([address])     Dyspnea on exertion 10/11/2021    Edema of both feet 01/23/2020    Gastric bypass status for obesity     Hyperlipidemia     Hypertension     Hypokalemia 11/14/2021    Impaired fasting glucose     last assessed 5/10/17    Knee sprain, bilateral 06/14/2018    Leg cramps 06/16/2022    Obesity     Viral gastroenteritis     last assessed 11/4/16       Past Surgical History:   Procedure Laterality Date    CARDIAC CATHETERIZATION N/A 3/9/2022    Procedure: CARDIAC RHC W/ PRESSURE SENSOR;  Surgeon: Carman Soulier, MD;  Location: [facility]; Service: Cardiology    CARDIAC CATHETERIZATION N/A 9/6/2022    Procedure: Cardiac catheterization;  Surgeon: Bari Power DO;  Location: [facility]; Service: Cardiology    CARDIAC CATHETERIZATION N/A 9/6/2022    Procedure: Cardiac Coronary Angiogram;  Surgeon: Bari Power DO;  Location: [facility]; Service: Cardiology    CARDIAC CATHETERIZATION N/A 10/11/2023    Procedure: Cardiac RHC; Surgeon: Sudeep Varghese MD;  Location: [facility]; Service: Cardiology    CARPAL TUNNEL RELEASE      bilateral    GASTRIC BYPASS  2004    with han en y    HERNIA REPAIR      ventral inscisional    TONSILLECTOMY         Meds/Allergies:  Prior to Admission medications    Medication Sig Start Date End Date Taking? Authorizing Provider   Accu-Chek FastClix Lancets MISC Test blood sugar twice daily 11/16/21   Sundeep Arndt MD   albuterol (PROVENTIL HFA,VENTOLIN HFA) 90 mcg/act inhaler Inhale 2 puffs every 4 (four) hours as needed for wheezing or shortness of breath 10/12/23   RODRIGUE Rushing   apixaban (Eliquis) 5 mg Take 1 tablet (5 mg total) by mouth 2 (two) times a day 10/24/23   Fernanda Brandt MD   atorvastatin (LIPITOR) 10 mg tablet TAKE 1 TABLET BY MOUTH EVERY DAY 11/24/23   Fernanda Brandt MD   Blood Glucose Monitoring Suppl (Accu-Chek Guide) w/Device KIT Use 2 (two) times a day Test blood sugar twice daily 8/5/21   Sundeep Arndt MD   budesonide-formoterol (Symbicort) 160-4.5 mcg/act inhaler Inhale 2 puffs 2 (two) times a day Rinse mouth after use.  11/29/22   Sundeep Arndt MD   bumetanide (BUMEX) 2 mg tablet Take 3 tablets (6 mg total) by mouth 2 (two) times a day 11/22/23 5/20/24  Sharron Mortimer, PA-C   Empagliflozin (JARDIANCE) 10 MG TABS tablet Take 1 tablet (10 mg total) by mouth daily 5/26/23   Joey Montoya MD   EPINEPHrine (EPIPEN) 0.3 mg/0.3 mL SOAJ Inject 0.3 mL (0.3 mg total) into a muscle once for 1 dose 2/21/23 11/7/23  Eric Chapman MD   Fasenra Pen 30 MG/ML SOAJ  3/24/23   Historical Provider, MD   fluticasone (FLONASE) 50 mcg/act nasal spray 1 spray into each nostril 2 (two) times a day 10/12/23   RODRIGUE Wasserman   loratadine (CLARITIN) 10 mg tablet Take 1 tablet (10 mg total) by mouth daily Do not start before October 13, 2023. 10/13/23   LacretiRODRIGUE Rubio   metolazone (ZAROXOLYN) 2.5 mg tablet Take 20-30 minutes before Bumex dose and with additional potassium. Take for weight gain of 2-3 lbs in 24 hours, 5lbs in one week or signs of worsening fluid retention. 10/12/23   RODRIGUE Rodriges   metoprolol succinate (TOPROL-XL) 50 mg 24 hr tablet TAKE ONE (1) TABLET BY MOUTH DAILY 10/26/23   Joey Montoya MD   montelukast (SINGULAIR) 10 mg tablet Take 1 tablet (10 mg total) by mouth daily at bedtime 2/3/22 11/7/23  Paulino Owens MD   pantoprazole (PROTONIX) 40 mg tablet Take 1 tablet (40 mg total) by mouth daily 3/21/23   RODRIGUE Mars   potassium chloride (K-DUR,KLOR-CON) 20 mEq tablet Take 2 tablets (40 mEq total) by mouth 2 (two) times a day 7/28/23 11/7/23  Yan St MD   pregabalin (LYRICA) 50 mg capsule Take 1 caps. at bedtime 7/6/23   Paulino Owens MD   sitaGLIPtin (Januvia) 100 mg tablet TAKE 1/2 TABLET DAILY 8/21/23   Paulino Owens MD   Spiriva Respimat 1.25 MCG/ACT AERS inhaler INHALE 2 PUFFS BY MOUTH EVERY DAY 2/5/23   Paulino Owens MD   spironolactone (ALDACTONE) 25 mg tablet TAKE 1 TABLET (25 MG TOTAL) BY MOUTH DAILY. 11/6/23   Joey Montoya MD     I have reviewed home medications with patient personally. 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   Patient Pre-hospital Living Situation: Home  Patient Pre-hospital Level of Mobility: walks  Patient Pre-hospital Diet Restrictions: None  Substance Use History:   Social History     Substance and Sexual Activity   Alcohol Use Yes    Alcohol/week: 2.0 standard drinks of alcohol    Types: 2 Shots of liquor per week    Comment: social     Social History     Tobacco Use   Smoking Status Former    Types: Pipe, Cigars    Start date:     Quit date: 2021    Years since quittin.9   Smokeless Tobacco Never   Tobacco Comments    cigar once a day     Social History     Substance and Sexual Activity   Drug Use No       Family History:  Family History   Problem Relation Age of Onset    Stroke Mother     Hypertension Father     Cancer Father     COPD Father        Physical Exam:     Vitals:   Blood Pressure: 129/69 (23 1330)  Pulse: 98 (23 1330)  Temperature: 98.2 °F (36.8 °C) (23 1017)  Temp Source: Oral (23 1017)  Respirations: (!) 24 (23 1330)  SpO2: 96 % (23 1330)    Physical Exam  Vitals reviewed. Constitutional:       General: He is not in acute distress. Appearance: Normal appearance. He is not ill-appearing. HENT:      Head: Normocephalic and atraumatic. Right Ear: External ear normal.      Left Ear: External ear normal.      Nose: Nose normal.   Eyes:      Extraocular Movements: Extraocular movements intact. Conjunctiva/sclera: Conjunctivae normal.   Cardiovascular:      Rate and Rhythm: Normal rate and regular rhythm. Pulses: Normal pulses. Heart sounds: Normal heart sounds. Pulmonary:      Effort: Respiratory distress present. Breath sounds: Wheezing present.       Comments: Patient tachypneic  Chest:      Chest wall: No tenderness. Abdominal:      General: Abdomen is flat. Palpations: Abdomen is soft. Musculoskeletal:         General: Normal range of motion. Cervical back: Normal range of motion. Skin:     General: Skin is warm and dry. Neurological:      General: No focal deficit present. Mental Status: He is alert. Mental status is at baseline. Psychiatric:         Mood and Affect: Mood normal.         Behavior: Behavior normal.          Additional Data:     Lab Results:  Results from last 7 days   Lab Units 11/27/23  1056   WBC Thousand/uL 12.33*   HEMOGLOBIN g/dL 10.9*   HEMATOCRIT % 35.3*   PLATELETS Thousands/uL 212   BANDS PCT % 19*   LYMPHO PCT % 0*   MONO PCT % 2*   EOS PCT % 0     Results from last 7 days   Lab Units 11/27/23  1056   SODIUM mmol/L 127*   POTASSIUM mmol/L 4.5   CHLORIDE mmol/L 91*   CO2 mmol/L 20*   BUN mg/dL 18   CREATININE mg/dL 1.94*   ANION GAP mmol/L 16   CALCIUM mg/dL 7.5*   ALBUMIN g/dL 3.6   TOTAL BILIRUBIN mg/dL 1.41*   ALK PHOS U/L 185*   ALT U/L 48   AST U/L 106*   GLUCOSE RANDOM mg/dL 276*                       Lines/Drains:  Invasive Devices       Peripheral Intravenous Line  Duration             Peripheral IV 11/27/23 Left Antecubital <1 day                        Imaging: Reviewed radiology reports from this admission including: chest CT scan  CTA ED chest PE Study   Final Result by Yuko Medina MD (11/27 6710)      No acute pulmonary emboli. No focal consolidation. A Cardio mems device in the left lower lobe segmental pulmonary artery. A small amount of fluid and debris within the lower thoracic esophagus, suggesting reflux. This puts the patient at increased risk for aspiration pneumonitis. The study was marked in [facility] for immediate notification. Workstation performed: [id number]         XR chest 1 view portable   Final Result by Daryl Lyman MD (11/27 4209)      No acute cardiopulmonary disease.                   Workstation performed: [id number]             EKG and Other Studies Reviewed on Admission:   EKG: Sinus Tachycardia. . ** Please Note: This note has been constructed using a voice recognition system.  **

## 2023-11-27 NOTE — ED PROVIDER NOTES
History  Chief Complaint   Patient presents with    Shortness of Breath     Pt c/o increased SOB and wheezing over last few days worsening this morning, hx asthma and CHF, no relief with rescue inhalers, reports feeling dizzy while walking and 1 episode of diarrhea this morning     65 yo M with PMHx as listed below, presents with several days of increasing SOB. Pt was trying to manage his sxs with his rescue inhaler but he became severely SOB this am and began experiencing R upper chest pain. Pt denies N/V, fevers, chills, recent illness, increased peripheral edema, and orthopnea. Chest pain radiates from the back to the front and is described as sharp, exacerbated by deep inspiration. Prior to Admission Medications   Prescriptions Last Dose Informant Patient Reported? Taking? Accu-Chek FastClix Lancets MISC  Self No No   Sig: Test blood sugar twice daily   Blood Glucose Monitoring Suppl (Accu-Chek Guide) w/Device KIT  Self No No   Sig: Use 2 (two) times a day Test blood sugar twice daily   EPINEPHrine (EPIPEN) 0.3 mg/0.3 mL SOAJ  Self No No   Sig: Inject 0.3 mL (0.3 mg total) into a muscle once for 1 dose   Empagliflozin (JARDIANCE) 10 MG TABS tablet  Self No No   Sig: Take 1 tablet (10 mg total) by mouth daily   Fasenra Pen 30 MG/ML SOAJ  Self Yes No   Spiriva Respimat 1.25 MCG/ACT AERS inhaler  Self No No   Sig: INHALE 2 PUFFS BY MOUTH EVERY DAY   albuterol (PROVENTIL HFA,VENTOLIN HFA) 90 mcg/act inhaler  Self No No   Sig: Inhale 2 puffs every 4 (four) hours as needed for wheezing or shortness of breath   apixaban (Eliquis) 5 mg  Self No No   Sig: Take 1 tablet (5 mg total) by mouth 2 (two) times a day   atorvastatin (LIPITOR) 10 mg tablet   No No   Sig: TAKE 1 TABLET BY MOUTH EVERY DAY   budesonide-formoterol (Symbicort) 160-4.5 mcg/act inhaler  Self No No   Sig: Inhale 2 puffs 2 (two) times a day Rinse mouth after use.    bumetanide (BUMEX) 2 mg tablet   No No   Sig: Take 3 tablets (6 mg total) by mouth 2 (two) times a day   fluticasone (FLONASE) 50 mcg/act nasal spray  Self No No   Si spray into each nostril 2 (two) times a day   loratadine (CLARITIN) 10 mg tablet  Self No No   Sig: Take 1 tablet (10 mg total) by mouth daily Do not start before 2023. metolazone (ZAROXOLYN) 2.5 mg tablet  Self No No   Sig: Take 20-30 minutes before Bumex dose and with additional potassium. Take for weight gain of 2-3 lbs in 24 hours, 5lbs in one week or signs of worsening fluid retention. metoprolol succinate (TOPROL-XL) 50 mg 24 hr tablet  Self No No   Sig: TAKE ONE (1) TABLET BY MOUTH DAILY   montelukast (SINGULAIR) 10 mg tablet  Self No No   Sig: Take 1 tablet (10 mg total) by mouth daily at bedtime   pantoprazole (PROTONIX) 40 mg tablet  Self No No   Sig: Take 1 tablet (40 mg total) by mouth daily   potassium chloride (K-DUR,KLOR-CON) 20 mEq tablet  Self No No   Sig: Take 2 tablets (40 mEq total) by mouth 2 (two) times a day   pregabalin (LYRICA) 50 mg capsule  Self No No   Sig: Take 1 caps. at bedtime   sitaGLIPtin (Januvia) 100 mg tablet  Self No No   Sig: TAKE 1/2 TABLET DAILY   spironolactone (ALDACTONE) 25 mg tablet  Self No No   Sig: TAKE 1 TABLET (25 MG TOTAL) BY MOUTH DAILY.       Facility-Administered Medications: None       Past Medical History:   Diagnosis Date    Acute gastritis without hemorrhage     last assessed 3/24/17    Asthma     Atrial fibrillation ([address])     Bilateral leg edema 2020    CHF (congestive heart failure) [facility])     Coronary artery disease     Daytime sleepiness 2019    Diabetes mellitus ([address])     Dyspnea on exertion 10/11/2021    Edema of both feet 2020    Gastric bypass status for obesity     Hyperlipidemia     Hypertension     Hypokalemia 2021    Impaired fasting glucose     last assessed 5/10/17    Knee sprain, bilateral 2018    Leg cramps 2022    Obesity     Viral gastroenteritis     last assessed 16       Past Surgical History: Procedure Laterality Date    CARDIAC CATHETERIZATION N/A 3/9/2022    Procedure: CARDIAC RHC W/ PRESSURE SENSOR;  Surgeon: Marybeth Barreto MD;  Location: [facility]; Service: Cardiology    CARDIAC CATHETERIZATION N/A 2022    Procedure: Cardiac catheterization;  Surgeon: Lilian Kincaid DO;  [facility]; Service: Cardiology    CARDIAC CATHETERIZATION N/A 2022    Procedure: Cardiac Coronary Angiogram;  Surgeon: Lilian Kincaid DO;  [facility]; Service: Cardiology    CARDIAC CATHETERIZATION N/A 10/11/2023    Procedure: Cardiac RHC; Surgeon: Brigitte Valdez MD;  [facility]: [facility]; Service: Cardiology    CARPAL TUNNEL RELEASE      bilateral    GASTRIC BYPASS      with han en y    HERNIA REPAIR      ventral inscisional    TONSILLECTOMY         Family History   Problem Relation Age of Onset    Stroke Mother     Hypertension Father     Cancer Father     COPD Father      I have reviewed and agree with the history as documented. E-Cigarette/Vaping    E-Cigarette Use Never User      E-Cigarette/Vaping Substances    Nicotine No     THC No     CBD No     Flavoring No     Other No     Unknown No      Social History     Tobacco Use    Smoking status: Former     Types: Pipe, Cigars     Start date:      Quit date: 2021     Years since quittin.9    Smokeless tobacco: Never    Tobacco comments:     cigar once a day   Vaping Use    Vaping Use: Never used   Substance Use Topics    Alcohol use: Yes     Alcohol/week: 2.0 standard drinks of alcohol     Types: 2 Shots of liquor per week     Comment: social    Drug use: No        Review of Systems   Respiratory:  Positive for shortness of breath and wheezing. All other systems reviewed and are negative.       Physical Exam  ED Triage Vitals   Temperature Pulse Respirations Blood Pressure SpO2   23 1017 23 1017 23 1017 23 1017 23 1017   98.2 °F (36.8 °C) (!) 120 (!) 24 115/66 99 %      Temp Source Heart Rate Source Patient Position - Orthostatic VS BP Location FiO2 (%)   11/27/23 1017 11/27/23 1017 11/27/23 1017 11/27/23 1017 --   Oral Monitor Sitting Right arm       Pain Score       11/27/23 1401       6             Orthostatic Vital Signs  Vitals:    11/27/23 1017 11/27/23 1115 11/27/23 1330   BP: 115/66 106/56 129/69   Pulse: (!) 120 (!) 114 98   Patient Position - Orthostatic VS: Sitting           Physical Exam  Gen: NAD, AA&Ox3  HEENT: PERRL, EOMI  Neck: supple  CV: RRR  Lungs: Diminished throughout with exp wheezing. Abdomen: soft, NT/ND, no rebound  Ext: + 2 peripheral edema b/l, up to knee.    Neuro: 5/5 strength all extremities, sensation grossly intact  Skin: no rash       ED Medications  Medications   acetaminophen (TYLENOL) tablet 975 mg (975 mg Oral Given 11/27/23 1407)   albuterol (FOR EMS ONLY) (2.5 mg/3 mL) 0.083 % inhalation solution 2.5 mg (0 mg Does not apply Given to EMS 11/27/23 1031)   albuterol inhalation solution 10 mg (10 mg Nebulization Given 11/27/23 1056)   ipratropium (ATROVENT) 0.02 % inhalation solution 1 mg (1 mg Nebulization Given 11/27/23 1056)   sodium chloride 0.9 % inhalation solution 12 mL (12 mL Nebulization Given 11/27/23 1056)   magnesium sulfate 2 g/50 mL IVPB (premix) 2 g (0 g Intravenous Stopped 11/27/23 1142)   methylPREDNISolone sodium succinate (Solu-MEDROL) injection 125 mg (125 mg Intravenous Given 11/27/23 1056)   iohexol (OMNIPAQUE) 350 MG/ML injection (MULTI-DOSE) 100 mL (100 mL Intravenous Given 11/27/23 1215)       Diagnostic Studies  Results Reviewed       Procedure Component Value Units Date/Time    HS Troponin I 2hr [[id number]]  (Abnormal) Collected: 11/27/23 1238    Lab Status: Final result Specimen: Blood from Arm, Left Updated: 11/27/23 1324     hs TnI 2hr 157 ng/L      Delta 2hr hsTnI -3 ng/L     COVID/FLU/RSV [[id number]]  (Normal) Collected: 11/27/23 1056    Lab Status: Final result Specimen: Nares from Nose Updated: 11/27/23 1259     SARS-CoV-2 Negative     INFLUENZA A PCR Negative     INFLUENZA B PCR Negative     RSV PCR Negative    Narrative:      FOR PEDIATRIC PATIENTS - copy/paste COVID Guidelines URL to browser: [email or web address]    SARS-CoV-2 assay is a Nucleic Acid Amplification assay intended for the  qualitative detection of nucleic acid from SARS-CoV-2 in nasopharyngeal  swabs. Results are for the presumptive identification of SARS-CoV-2 RNA. Positive results are indicative of infection with SARS-CoV-2, the virus  causing COVID-19, but do not rule out bacterial infection or co-infection  with other viruses. Laboratories within the [facility] and its  territories are required to report all positive results to the appropriate  public health authorities. Negative results do not preclude SARS-CoV-2  infection and should not be used as the sole basis for treatment or other  patient management decisions. Negative results must be combined with  clinical observations, patient history, and epidemiological information. This test has not been FDA cleared or approved. This test has been authorized by FDA under an Emergency Use Authorization  (EUA). This test is only authorized for the duration of time the  declaration that circumstances exist justifying the authorization of the  emergency use of an in vitro diagnostic tests for detection of SARS-CoV-2  virus and/or diagnosis of COVID-19 infection under section 564(b)(1) of  the Act, 21 U. S.C. 029EEO-8(F)(5), unless the authorization is terminated  or revoked sooner. The test has been validated but independent review by FDA  and CLIA is pending. Test performed using [employer]: This RT-PCR assay targets N2,  a region unique to SARS-CoV-2. A conserved region in the E-gene was chosen  for pan-Sarbecovirus detection which includes SARS-CoV-2.     According to CMS-2020-01-R, this platform meets the definition of high-throughput technology. CBC and differential [[id number]]  (Abnormal) Collected: 11/27/23 1056    Lab Status: Final result Specimen: Blood from Arm, Left Updated: 11/27/23 1215     WBC 12.33 Thousand/uL      RBC 4.20 Million/uL      Hemoglobin 10.9 g/dL      Hematocrit 35.3 %      MCV 84 fL      MCH 26.0 pg      MCHC 30.9 g/dL      RDW 18.4 %      MPV 10.7 fL      Platelets 488 Thousands/uL     Narrative: This is an appended report. These results have been appended to a previously verified report.     Manual Differential(PHLEBS Do Not Order) [[id number]]  (Abnormal) Collected: 11/27/23 1056    Lab Status: Final result Specimen: Blood from Arm, Left Updated: 11/27/23 1215     Segmented % 77 %      Bands % 19 %      Lymphocytes % 0 %      Monocytes % 2 %      Eosinophils, % 0 %      Basophils % 0 %      Metamyelocytes% 2 %      Absolute Neutrophils 11.84 Thousand/uL      Lymphocytes Absolute 0.00 Thousand/uL      Monocytes Absolute 0.25 Thousand/uL      Eosinophils Absolute 0.00 Thousand/uL      Basophils Absolute 0.00 Thousand/uL      Total Counted --     Platelet Estimate Adequate    HS Troponin I 4hr [[id number]]     Lab Status: No result Specimen: Blood     B-Type Natriuretic Peptide(BNP) [[id number]]     Lab Status: No result Specimen: Blood     Comprehensive metabolic panel [[id number]]  (Abnormal) Collected: 11/27/23 1056    Lab Status: Final result Specimen: Blood from Arm, Left Updated: 11/27/23 1155     Sodium 127 mmol/L      Potassium 4.5 mmol/L      Chloride 91 mmol/L      CO2 20 mmol/L      ANION GAP 16 mmol/L      BUN 18 mg/dL      Creatinine 1.94 mg/dL      Glucose 276 mg/dL      Calcium 7.5 mg/dL       U/L      ALT 48 U/L      Alkaline Phosphatase 185 U/L      Total Protein 6.0 g/dL      Albumin 3.6 g/dL      Total Bilirubin 1.41 mg/dL      eGFR 37 ml/min/1.73sq m     Narrative:      [facility] guidelines for Chronic Kidney Disease (CKD):     Stage 1 with normal or high GFR (GFR > 90 mL/min/1.73 square meters)    Stage 2 Mild CKD (GFR = 60-89 mL/min/1.73 square meters)    Stage 3A Moderate CKD (GFR = 45-59 mL/min/1.73 square meters)    Stage 3B Moderate CKD (GFR = 30-44 mL/min/1.73 square meters)    Stage 4 Severe CKD (GFR = 15-29 mL/min/1.73 square meters)    Stage 5 End Stage CKD (GFR <15 mL/min/1.73 square meters)  Note: GFR calculation is accurate only with a steady state creatinine    HS Troponin 0hr (reflex protocol) [[id number]]  (Abnormal) Collected: 11/27/23 1056    Lab Status: Final result Specimen: Blood from Arm, Left Updated: 11/27/23 1147     hs TnI 0hr 160 ng/L     D-dimer, quantitative [[id number]]  (Abnormal) Collected: 11/27/23 1056    Lab Status: Final result Specimen: Blood from Arm, Left Updated: 11/27/23 1142     D-Dimer, Quant 1.55 ug/ml FEU     Narrative: In the evaluation for possible pulmonary embolism, in the appropriate (Well's Score of 4 or less) patient, the age adjusted d-dimer cutoff for this patient can be calculated as:    Age x 0.01 (in ug/mL) for Age-adjusted D-dimer exclusion threshold for a patient over 50 years. CTA ED chest PE Study   Final Result by Yamilet Lauren MD (11/27 5761)      No acute pulmonary emboli. No focal consolidation. A Cardio mems device in the left lower lobe segmental pulmonary artery. A small amount of fluid and debris within the lower thoracic esophagus, suggesting reflux. This puts the patient at increased risk for aspiration pneumonitis. The study was marked in [facility] for immediate notification. Workstation performed: [id number]         XR chest 1 view portable   Final Result by Daryl Nolan MD (11/27 3554)      No acute cardiopulmonary disease.                   Workstation performed: [id number]               Procedures  Procedures      ED Course                                 Wells' Criteria for PE      Flowsheet Row Most Recent Value Micah Pritchard' Criteria for PE    Clinical signs and symptoms of DVT 3 Filed at: 11/27/2023 1152   PE is primary diagnosis or equally likely 3 Filed at: 11/27/2023 1152   HR >100 1.5 Filed at: 11/27/2023 1152   Immobilization at least 3 days or Surgery in the previous 4 weeks 0 Filed at: 11/27/2023 1152   Previous, objectively diagnosed PE or DVT 0 Filed at: 11/27/2023 1152   Hemoptysis 0 Filed at: 11/27/2023 1152   Malignancy with treatment within 6 months or palliative 0 Filed at: 11/27/2023 1152   Wells' Criteria Total 7.5 Filed at: 11/27/2023 1152              Medical Decision Making  Will treat asthma exacerbation with serrano/solumedrol/mag and basic labs/CXR. Differentials include, ACS, CHF exacerbation, PE, and MI. Will order dimer, trops, and bnp. Dimer elevated, will order CT PE. Imaging negative for PE. On reassessment, pt feels better wheezing resolved. Pt still feels SOB, will discuss the case with aden for admission     Case discussed with ADEN, pt will be admitted     Amount and/or Complexity of Data Reviewed  Labs: ordered. Radiology: ordered and independent interpretation performed. Risk  Prescription drug management. Decision regarding hospitalization. Disposition  Final diagnoses:   Asthma exacerbation   Heart failure ([address])     Time reflects when diagnosis was documented in both MDM as applicable and the Disposition within this note       Time User Action Codes Description Comment    11/27/2023  1:38 PM Josephine Solares [J45.901] Asthma exacerbation     11/27/2023  1:39 PM Thais Núñez [I50.9] Heart failure [facility])           ED Disposition       ED Disposition   Admit    Condition   Stable    Date/Time   Mon Nov 27, 2023 2541    Comment   Case was discussed with Dr Margaret Gimenez and the patient's admission status was agreed to be Admission Status: inpatient status to the service of Dr. Margaret Gimenez .                Follow-up Information    None         Patient's Medications   Discharge Prescriptions    No medications on file     No discharge procedures on file. PDMP Review         Value Time User    PDMP Reviewed  Yes 10/6/2023 10:22 PM Mariel Siemens, DO             ED Provider  Attending physically available and evaluated Amita Galvan. I managed the patient along with the ED Attending.     Electronically Signed by           Damaso Brownlee MD  11/27/23 6164

## 2023-11-27 NOTE — ASSESSMENT & PLAN NOTE
Lab Results   Component Value Date    EGFR 37 11/27/2023    EGFR 37 10/20/2023    EGFR 36 10/19/2023    CREATININE 1.94 (H) 11/27/2023    CREATININE 1.96 (H) 10/20/2023    CREATININE 2.00 (H) 10/19/2023   Patient appears to be at his baseline  Will avoid nephrotoxic medication if possible  Patient noted to be on Bumex as well as spironolactone  Continue monitoring with CMP/BMP

## 2023-11-27 NOTE — ASSESSMENT & PLAN NOTE
Wt Readings from Last 3 Encounters:   11/07/23 (!) 152 kg (335 lb 12.8 oz)   10/27/23 (!) 154 kg (338 lb 9.6 oz)   10/20/23 (!) 154 kg (338 lb 9.6 oz)   Patient had an echo done on April 2023 with an EF of 50%  Will continue Toprol 50 mg daily  Will continue Jardiance 10 mg daily  Continue Bumex 6 mg twice daily as well as spironolactone 25 mg  Monitor daily weights and strict intake and output  Patient appears to be euvolemic at this time

## 2023-11-28 VITALS
DIASTOLIC BLOOD PRESSURE: 71 MMHG | HEART RATE: 84 BPM | OXYGEN SATURATION: 98 % | SYSTOLIC BLOOD PRESSURE: 108 MMHG | TEMPERATURE: 98.3 F | RESPIRATION RATE: 13 BRPM

## 2023-11-28 LAB
ALBUMIN SERPL BCP-MCNC: 3.4 G/DL (ref 3.5–5)
ALP SERPL-CCNC: 140 U/L (ref 34–104)
ALT SERPL W P-5'-P-CCNC: 47 U/L (ref 7–52)
ANION GAP SERPL CALCULATED.3IONS-SCNC: 14 MMOL/L
ANION GAP SERPL CALCULATED.3IONS-SCNC: 15 MMOL/L
ANISOCYTOSIS BLD QL SMEAR: PRESENT
AST SERPL W P-5'-P-CCNC: 61 U/L (ref 13–39)
BASOPHILS # BLD MANUAL: 0 THOUSAND/UL (ref 0–0.1)
BASOPHILS NFR MAR MANUAL: 0 % (ref 0–1)
BILIRUB SERPL-MCNC: 0.85 MG/DL (ref 0.2–1)
BUN SERPL-MCNC: 38 MG/DL (ref 5–25)
BUN SERPL-MCNC: 47 MG/DL (ref 5–25)
CALCIUM ALBUM COR SERPL-MCNC: 7.9 MG/DL (ref 8.3–10.1)
CALCIUM SERPL-MCNC: 7.4 MG/DL (ref 8.4–10.2)
CALCIUM SERPL-MCNC: 7.6 MG/DL (ref 8.4–10.2)
CHLORIDE SERPL-SCNC: 93 MMOL/L (ref 96–108)
CHLORIDE SERPL-SCNC: 94 MMOL/L (ref 96–108)
CO2 SERPL-SCNC: 18 MMOL/L (ref 21–32)
CO2 SERPL-SCNC: 19 MMOL/L (ref 21–32)
CREAT SERPL-MCNC: 2.41 MG/DL (ref 0.6–1.3)
CREAT SERPL-MCNC: 2.49 MG/DL (ref 0.6–1.3)
EOSINOPHIL # BLD MANUAL: 0 THOUSAND/UL (ref 0–0.4)
EOSINOPHIL NFR BLD MANUAL: 0 % (ref 0–6)
ERYTHROCYTE [DISTWIDTH] IN BLOOD BY AUTOMATED COUNT: 18.2 % (ref 11.6–15.1)
GFR SERPL CREATININE-BSD FRML MDRD: 27 ML/MIN/1.73SQ M
GFR SERPL CREATININE-BSD FRML MDRD: 28 ML/MIN/1.73SQ M
GIANT PLATELETS BLD QL SMEAR: PRESENT
GLUCOSE SERPL-MCNC: 253 MG/DL (ref 65–140)
GLUCOSE SERPL-MCNC: 319 MG/DL (ref 65–140)
GLUCOSE SERPL-MCNC: 341 MG/DL (ref 65–140)
GLUCOSE SERPL-MCNC: 358 MG/DL (ref 65–140)
GLUCOSE SERPL-MCNC: 362 MG/DL (ref 65–140)
GLUCOSE SERPL-MCNC: 402 MG/DL (ref 65–140)
HCT VFR BLD AUTO: 29.7 % (ref 36.5–49.3)
HGB BLD-MCNC: 9.7 G/DL (ref 12–17)
LYMPHOCYTES # BLD AUTO: 0.18 THOUSAND/UL (ref 0.6–4.47)
LYMPHOCYTES # BLD AUTO: 1 % (ref 14–44)
MAGNESIUM SERPL-MCNC: 2.1 MG/DL (ref 1.9–2.7)
MCH RBC QN AUTO: 26.4 PG (ref 26.8–34.3)
MCHC RBC AUTO-ENTMCNC: 32.7 G/DL (ref 31.4–37.4)
MCV RBC AUTO: 81 FL (ref 82–98)
MONOCYTES # BLD AUTO: 0 THOUSAND/UL (ref 0–1.22)
MONOCYTES NFR BLD: 0 % (ref 4–12)
NEUTROPHILS # BLD MANUAL: 17.93 THOUSAND/UL (ref 1.85–7.62)
NEUTS BAND NFR BLD MANUAL: 17 % (ref 0–8)
NEUTS SEG NFR BLD AUTO: 82 % (ref 43–75)
OVALOCYTES BLD QL SMEAR: PRESENT
PHOSPHATE SERPL-MCNC: 3 MG/DL (ref 2.7–4.5)
PLATELET # BLD AUTO: 192 THOUSANDS/UL (ref 149–390)
PLATELET BLD QL SMEAR: ADEQUATE
PMV BLD AUTO: 10.5 FL (ref 8.9–12.7)
POLYCHROMASIA BLD QL SMEAR: PRESENT
POTASSIUM SERPL-SCNC: 4.3 MMOL/L (ref 3.5–5.3)
POTASSIUM SERPL-SCNC: 5.2 MMOL/L (ref 3.5–5.3)
PROT SERPL-MCNC: 6.2 G/DL (ref 6.4–8.4)
RBC # BLD AUTO: 3.67 MILLION/UL (ref 3.88–5.62)
RBC MORPH BLD: PRESENT
SODIUM SERPL-SCNC: 126 MMOL/L (ref 135–147)
SODIUM SERPL-SCNC: 127 MMOL/L (ref 135–147)
WBC # BLD AUTO: 18.11 THOUSAND/UL (ref 4.31–10.16)

## 2023-11-28 PROCEDURE — 83735 ASSAY OF MAGNESIUM: CPT | Performed by: FAMILY MEDICINE

## 2023-11-28 PROCEDURE — 94664 DEMO&/EVAL PT USE INHALER: CPT

## 2023-11-28 PROCEDURE — 80048 BASIC METABOLIC PNL TOTAL CA: CPT | Performed by: PHYSICIAN ASSISTANT

## 2023-11-28 PROCEDURE — 94760 N-INVAS EAR/PLS OXIMETRY 1: CPT

## 2023-11-28 PROCEDURE — 80053 COMPREHEN METABOLIC PANEL: CPT | Performed by: FAMILY MEDICINE

## 2023-11-28 PROCEDURE — 82948 REAGENT STRIP/BLOOD GLUCOSE: CPT

## 2023-11-28 PROCEDURE — 84100 ASSAY OF PHOSPHORUS: CPT | Performed by: FAMILY MEDICINE

## 2023-11-28 PROCEDURE — 85027 COMPLETE CBC AUTOMATED: CPT | Performed by: FAMILY MEDICINE

## 2023-11-28 PROCEDURE — 99239 HOSP IP/OBS DSCHRG MGMT >30: CPT | Performed by: PHYSICIAN ASSISTANT

## 2023-11-28 PROCEDURE — 94640 AIRWAY INHALATION TREATMENT: CPT

## 2023-11-28 PROCEDURE — 94150 VITAL CAPACITY TEST: CPT

## 2023-11-28 PROCEDURE — 85007 BL SMEAR W/DIFF WBC COUNT: CPT | Performed by: FAMILY MEDICINE

## 2023-11-28 RX ORDER — ALBUTEROL SULFATE 90 UG/1
2 AEROSOL, METERED RESPIRATORY (INHALATION) EVERY 4 HOURS PRN
Qty: 18 G | Refills: 0 | Status: SHIPPED | OUTPATIENT
Start: 2023-11-28

## 2023-11-28 RX ORDER — METHYLPREDNISOLONE SODIUM SUCCINATE 40 MG/ML
40 INJECTION, POWDER, LYOPHILIZED, FOR SOLUTION INTRAMUSCULAR; INTRAVENOUS EVERY 8 HOURS SCHEDULED
Status: COMPLETED | OUTPATIENT
Start: 2023-11-28 | End: 2023-11-28

## 2023-11-28 RX ORDER — PREDNISONE 20 MG/1
40 TABLET ORAL DAILY
Qty: 6 TABLET | Refills: 0 | Status: SHIPPED | OUTPATIENT
Start: 2023-11-29 | End: 2023-12-02

## 2023-11-28 RX ORDER — PREDNISONE 20 MG/1
40 TABLET ORAL DAILY
Status: DISCONTINUED | OUTPATIENT
Start: 2023-11-29 | End: 2023-11-28 | Stop reason: HOSPADM

## 2023-11-28 RX ORDER — BUMETANIDE 2 MG/1
6 TABLET ORAL 2 TIMES DAILY
Status: DISCONTINUED | OUTPATIENT
Start: 2023-11-28 | End: 2023-11-28 | Stop reason: HOSPADM

## 2023-11-28 RX ADMIN — INSULIN LISPRO 5 UNITS: 100 INJECTION, SOLUTION INTRAVENOUS; SUBCUTANEOUS at 01:38

## 2023-11-28 RX ADMIN — BUDESONIDE AND FORMOTEROL FUMARATE DIHYDRATE 2 PUFF: 160; 4.5 AEROSOL RESPIRATORY (INHALATION) at 13:12

## 2023-11-28 RX ADMIN — INSULIN LISPRO 6 UNITS: 100 INJECTION, SOLUTION INTRAVENOUS; SUBCUTANEOUS at 18:09

## 2023-11-28 RX ADMIN — UMECLIDINIUM 1 PUFF: 62.5 AEROSOL, POWDER ORAL at 13:12

## 2023-11-28 RX ADMIN — LEVALBUTEROL HYDROCHLORIDE 1.25 MG: 1.25 SOLUTION RESPIRATORY (INHALATION) at 19:31

## 2023-11-28 RX ADMIN — METHYLPREDNISOLONE SODIUM SUCCINATE 40 MG: 40 INJECTION, POWDER, FOR SOLUTION INTRAMUSCULAR; INTRAVENOUS at 13:20

## 2023-11-28 RX ADMIN — METOPROLOL SUCCINATE 50 MG: 50 TABLET, EXTENDED RELEASE ORAL at 09:01

## 2023-11-28 RX ADMIN — EMPAGLIFLOZIN 10 MG: 10 TABLET, FILM COATED ORAL at 11:21

## 2023-11-28 RX ADMIN — LEVALBUTEROL HYDROCHLORIDE 1.25 MG: 1.25 SOLUTION RESPIRATORY (INHALATION) at 08:29

## 2023-11-28 RX ADMIN — BUDESONIDE AND FORMOTEROL FUMARATE DIHYDRATE 2 PUFF: 160; 4.5 AEROSOL RESPIRATORY (INHALATION) at 17:51

## 2023-11-28 RX ADMIN — PANTOPRAZOLE SODIUM 40 MG: 40 TABLET, DELAYED RELEASE ORAL at 05:39

## 2023-11-28 RX ADMIN — INSULIN LISPRO 8 UNITS: 100 INJECTION, SOLUTION INTRAVENOUS; SUBCUTANEOUS at 09:00

## 2023-11-28 RX ADMIN — BUMETANIDE 6 MG: 2 TABLET ORAL at 18:11

## 2023-11-28 RX ADMIN — METHYLPREDNISOLONE SODIUM SUCCINATE 40 MG: 40 INJECTION, POWDER, FOR SOLUTION INTRAMUSCULAR; INTRAVENOUS at 05:38

## 2023-11-28 RX ADMIN — LEVALBUTEROL HYDROCHLORIDE 1.25 MG: 1.25 SOLUTION RESPIRATORY (INHALATION) at 14:16

## 2023-11-28 RX ADMIN — APIXABAN 5 MG: 5 TABLET, FILM COATED ORAL at 18:08

## 2023-11-28 RX ADMIN — SPIRONOLACTONE 25 MG: 25 TABLET ORAL at 09:02

## 2023-11-28 RX ADMIN — ACETAMINOPHEN 975 MG: 325 TABLET, FILM COATED ORAL at 11:19

## 2023-11-28 RX ADMIN — POTASSIUM CHLORIDE 40 MEQ: 1500 TABLET, EXTENDED RELEASE ORAL at 09:01

## 2023-11-28 RX ADMIN — POTASSIUM CHLORIDE 40 MEQ: 1500 TABLET, EXTENDED RELEASE ORAL at 18:07

## 2023-11-28 RX ADMIN — INSULIN LISPRO 10 UNITS: 100 INJECTION, SOLUTION INTRAVENOUS; SUBCUTANEOUS at 13:13

## 2023-11-28 RX ADMIN — APIXABAN 5 MG: 5 TABLET, FILM COATED ORAL at 09:02

## 2023-11-28 RX ADMIN — ACETAMINOPHEN 975 MG: 325 TABLET, FILM COATED ORAL at 01:37

## 2023-11-28 RX ADMIN — ATORVASTATIN CALCIUM 10 MG: 10 TABLET, FILM COATED ORAL at 09:02

## 2023-11-28 NOTE — ASSESSMENT & PLAN NOTE
Lab Results   Component Value Date    EGFR 27 11/28/2023    EGFR 28 11/28/2023    EGFR 37 11/27/2023    CREATININE 2.49 (H) 11/28/2023    CREATININE 2.41 (H) 11/28/2023    CREATININE 1.94 (H) 11/27/2023   Patient appears to be at his baseline around 2 though meets ANTONIO criteria with change from 1.94 to 2.49  Did receive IV contrast  Patient noted to be on Bumex as well as spironolactone  Held 1 dose of Bumex  Recommend BMP later this week as outpatient

## 2023-11-28 NOTE — CASE MANAGEMENT
Case Management Discharge Planning Note    Patient name Keny Hatfield  Location XR6 [id number] MRN [id number]  : 1965 Date 2023       Current Admission Date: 2023  Current Admission Diagnosis:Severe asthma with acute exacerbation   Patient Active Problem List    Diagnosis Date Noted    Severe asthma with acute exacerbation 2023    Eosinophilia 10/18/2023    Anemia of chronic disease 10/16/2023    Nasal congestion 10/06/2023    Severe persistent asthma without complication     Loose stools 2023    Chronic diastolic CHF ([address])     Diabetic polyneuropathy associated with type 2 diabetes mellitus ([address]) 2022    CKD (chronic kidney disease) stage 3 ([address]) 2022    Presence of CardioMEMS HF system 2022    Paroxysmal atrial fibrillation ([address]) 2022    Chronic heart failure with preserved ejection fraction (HFpEF) ([address]) 2021    Severe persistent asthma with exacerbation 10/11/2021    HNP (herniated nucleus pulposus), lumbar 2021    Foraminal stenosis of lumbar region 2021    VICKY (obstructive sleep apnea)     Hyperlipidemia 2019    Primary osteoarthritis of both knees 2018    Irritable bowel syndrome with diarrhea 2018    Primary hypertension 2018    Vitamin B12 deficiency 2016    Vitamin D deficiency 2016    Morbid obesity ([address]) 2016    Primary osteoarthritis of right knee 10/15/2013      LOS (days): 1  Geometric Mean LOS (GMLOS) (days):   Days to GMLOS:     OBJECTIVE:  Risk of Unplanned Readmission Score: 37.74         Current admission status: Inpatient   Preferred Pharmacy:   [facility], [address] [place] [address]  Phone: [phone] Fax: [phone]    Primary Care Provider: Sammy Way MD    Primary Insurance: Zara Chester  Secondary Insurance:     DISCHARGE DETAILS:    Additional Comments: Pt requested to speak with BIANCA.  BIANCA met with patient at bedside. Pt requesting medical records from prior admission for Social Security for his disability application. CM provided pt with medical records phone number. Pt requesting medical certificate be filled out for his [employer]. Pt reports he will call [employer] today and get the form faxed to CM. CM provided pt with fax number for form to be sent to CM. ADDENDUM 2:00pm: Provider signed emergency medical certification for [employer]. CM faxed certification to [facility] at [phone]. Pt aware.

## 2023-11-28 NOTE — ASSESSMENT & PLAN NOTE
Patient rate controlled with metoprolol succinate 50 mg daily patient anticoagulated with Eliquis 5 mg twice daily

## 2023-11-28 NOTE — UTILIZATION REVIEW
Initial Clinical Review    Admission: Date/Time/Statement:   Admission Orders (From admission, onward)       Ordered        11/27/23 1339  INPATIENT ADMISSION  Once                          Orders Placed This Encounter   Procedures    INPATIENT ADMISSION     Standing Status:   Standing     Number of Occurrences:   1     Order Specific Question:   Level of Care     Answer:   Med Surg [16]     Order Specific Question:   Estimated length of stay     Answer:   More than 2 Midnights     Order Specific Question:   Certification     Answer:   I certify that inpatient services are medically necessary for this patient for a duration of greater than two midnights. See H&P and MD Progress Notes for additional information about the patient's course of treatment. ED Arrival Information       Expected   -    Arrival   11/27/2023 10:12    Acuity   Urgent              Means of arrival   Ambulance    Escorted by   BRADEN Trinidad)    Service   Hospitalist    Admission type   Emergency              Arrival complaint   -             Chief Complaint   Patient presents with    Shortness of Breath     Pt c/o increased SOB and wheezing over last few days worsening this morning, hx asthma and CHF, no relief with rescue inhalers, reports feeling dizzy while walking and 1 episode of diarrhea this morning       Initial Presentation: 66 y.o. male w/ PMH of HLD, morbid obesity, paroxysmal Afib, HTN, CKD 3, DM, CHF, anemia, evere persistent asthma presented to the ED from home via EMS w/ worsening SOB. Pt reports SOB x3 days gradually getting worse, tried his inhalers w/o relief. In the ED, RR 24, , on exam diminished throughout with exp wheezing. Given multiple breathing txs, Solu Medrol, IV Mag but still visibly tachypneic and having significant increased work of breathing. Admit as Inpatient for evaluation and treatment of severe asthma w/ acute exacerbation. Plan: Continuous pulse ox monitoring.  Supplemental oxygen if desaturates below 89. Solu-Medrol 40 mg q8hrs. DuoNeb treatment. Respiratory protocol. Will hold off on antibiotic therapy at this time. Date: 11/28   Day 2: Pt's symptoms improved. Ambulating on RA w/ O2 sat @ 96%. continue his Symbicort, Spiriva, albuterol, and Singulair, will dc on prednisone. ED Triage Vitals   Temperature Pulse Respirations Blood Pressure SpO2   11/27/23 1017 11/27/23 1017 11/27/23 1017 11/27/23 1017 11/27/23 1017   98.2 °F (36.8 °C) (!) 120 (!) 24 115/66 99 %      Temp Source Heart Rate Source Patient Position - Orthostatic VS BP Location FiO2 (%)   11/27/23 1017 11/27/23 1017 11/27/23 1017 11/27/23 1017 --   Oral Monitor Sitting Right arm       Pain Score       11/27/23 1401       6          Wt Readings from Last 1 Encounters:   11/07/23 (!) 152 kg (335 lb 12.8 oz)     Additional Vital Signs:   Date/Time Temp Pulse Resp BP MAP (mmHg) SpO2 O2 Device Patient Position - Orthostatic VS   11/28/23 0829 -- -- -- -- -- 97 % None (Room air) --   11/28/23 0700 98.8 °F (37.1 °C) 86 17 139/68 92 97 % None (Room air) Lying   11/28/23 0137 -- -- -- -- -- 93 % None (Room air) --   11/28/23 0100 97.2 °F (36.2 °C) Abnormal  -- 18 130/65 -- -- None (Room air) Sitting   11/27/23 2219 -- 94 20 132/61 -- 97 % None (Room air) Lying   11/27/23 1746 -- 100 26 Abnormal  133/60 86 96 % None (Room air) Sitting   11/27/23 1330 -- 98 24 Abnormal  129/69 90 96 % -- --   11/27/23 1115 -- 114 Abnormal  24 Abnormal  106/56 73 100 % -- --       Pertinent Labs/Diagnostic Test Results:   CTA ED chest PE Study   Final Result by Manny Torres MD (11/27 1254)      No acute pulmonary emboli. No focal consolidation. A Cardio mems device in the left lower lobe segmental pulmonary artery. A small amount of fluid and debris within the lower thoracic esophagus, suggesting reflux. This puts the patient at increased risk for aspiration pneumonitis. The study was marked in [facility] for immediate notification. Workstation performed: [id number]         XR chest 1 view portable   Final Result by Cindy Leigh MD (11/27 1225)      No acute cardiopulmonary disease.                   Workstation performed: [id number]           11/27 EKG result: Sinus tachycardia  Low voltage QRS    Results from last 7 days   Lab Units 11/27/23  1056   SARS-COV-2  Negative     Results from last 7 days   Lab Units 11/28/23  0449 11/27/23  1056   WBC Thousand/uL 18.11* 12.33*   HEMOGLOBIN g/dL 9.7* 10.9*   HEMATOCRIT % 29.7* 35.3*   PLATELETS Thousands/uL 192 212   BANDS PCT % 17* 19*         Results from last 7 days   Lab Units 11/28/23  1358 11/28/23  0449 11/27/23  1056   SODIUM mmol/L 127* 126* 127*   POTASSIUM mmol/L 5.2 4.3 4.5   CHLORIDE mmol/L 94* 93* 91*   CO2 mmol/L 18* 19* 20*   ANION GAP mmol/L 15 14 16   BUN mg/dL 47* 38* 18   CREATININE mg/dL 2.49* 2.41* 1.94*   EGFR ml/min/1.73sq m 27 28 37   CALCIUM mg/dL 7.6* 7.4* 7.5*   MAGNESIUM mg/dL  --  2.1  --    PHOSPHORUS mg/dL  --  3.0  --      Results from last 7 days   Lab Units 11/28/23  0449 11/27/23  1056   AST U/L 61* 106*   ALT U/L 47 48   ALK PHOS U/L 140* 185*   TOTAL PROTEIN g/dL 6.2* 6.0*   ALBUMIN g/dL 3.4* 3.6   TOTAL BILIRUBIN mg/dL 0.85 1.41*     Results from last 7 days   Lab Units 11/28/23  1646 11/28/23  1131 11/28/23  0607 11/28/23  0118 11/27/23  2221 11/27/23  1700   POC GLUCOSE mg/dl 253* 358* 319* 402* 478* 487*     Results from last 7 days   Lab Units 11/28/23  1358 11/28/23  0449 11/27/23  1056   GLUCOSE RANDOM mg/dL 362* 341* 276*           Results from last 7 days   Lab Units 11/27/23  1557 11/27/23  1238 11/27/23  1056   HS TNI 0HR ng/L  --   --  160*   HS TNI 2HR ng/L  --  157*  --    HSTNI D2 ng/L  --  -3  --    HS TNI 4HR ng/L 195*  --   --    HSTNI D4 ng/L 35*  --   --      Results from last 7 days   Lab Units 11/27/23  1056   D-DIMER QUANTITATIVE ug/ml FEU 1.55*           Results from last 7 days   Lab Units 11/27/23  1056   BNP pg/mL 169* Results from last 7 days   Lab Units 11/27/23  1056   INFLUENZA A PCR  Negative   INFLUENZA B PCR  Negative   RSV PCR  Negative       ED Treatment:   Medication Administration from 11/27/2023 1012 to 11/28/2023 0119         Date/Time Order Dose Route Action     11/27/2023 1031 EST albuterol (FOR EMS ONLY) (2.5 mg/3 mL) 0.083 % inhalation solution 2.5 mg 0 mg Does not apply Given to EMS     11/27/2023 1056 EST albuterol inhalation solution 10 mg 10 mg Nebulization Given     11/27/2023 1056 EST ipratropium (ATROVENT) 0.02 % inhalation solution 1 mg 1 mg Nebulization Given     11/27/2023 1056 EST sodium chloride 0.9 % inhalation solution 12 mL 12 mL Nebulization Given     11/27/2023 1142 EST magnesium sulfate 2 g/50 mL IVPB (premix) 2 g 0 g Intravenous Stopped     11/27/2023 1107 EST magnesium sulfate 2 g/50 mL IVPB (premix) 2 g 2 g Intravenous New Bag     11/27/2023 1056 EST methylPREDNISolone sodium succinate (Solu-MEDROL) injection 125 mg 125 mg Intravenous Given     11/27/2023 1215 EST iohexol (OMNIPAQUE) 350 MG/ML injection (MULTI-DOSE) 100 mL 100 mL Intravenous Given     11/27/2023 1407 EST acetaminophen (TYLENOL) tablet 975 mg 975 mg Oral Given     11/27/2023 1700 EST apixaban (ELIQUIS) tablet 5 mg 5 mg Oral Given     11/27/2023 1743 EST budesonide-formoterol (SYMBICORT) 160-4.5 mcg/act inhaler 2 puff 2 puff Inhalation Given     11/27/2023 1700 EST bumetanide (BUMEX) tablet 6 mg 6 mg Oral Given     11/27/2023 1700 EST potassium chloride (K-DUR,KLOR-CON) CR tablet 40 mEq 40 mEq Oral Given     11/27/2023 2216 EST pregabalin (LYRICA) capsule 50 mg 50 mg Oral Given     11/27/2023 1743 EST umeclidinium 62.5 mcg/actuation inhaler AEPB 1 puff 1 puff Inhalation Given     11/27/2023 2051 EST levalbuterol (XOPENEX) inhalation solution 1.25 mg 1.25 mg Nebulization Given     11/27/2023 1701 EST levalbuterol (XOPENEX) inhalation solution 1.25 mg 1.25 mg Nebulization Given     11/27/2023 2051 EST sodium chloride 0.9 % inhalation solution 3 mL 3 mL Nebulization Given     11/27/2023 1700 EST sodium chloride 0.9 % inhalation solution 3 mL 3 mL Nebulization Given     11/27/2023 2216 EST methylPREDNISolone sodium succinate (Solu-MEDROL) injection 40 mg 40 mg Intravenous Given     11/27/2023 1700 EST methylPREDNISolone sodium succinate (Solu-MEDROL) injection 40 mg 40 mg Intravenous Given     11/27/2023 1701 EST insulin lispro (HumaLOG) 100 units/mL subcutaneous injection 2-12 Units 12 Units Subcutaneous Given          Past Medical History:   Diagnosis Date    Acute gastritis without hemorrhage     last assessed 3/24/17    Asthma     Atrial fibrillation (HCC)     Bilateral leg edema 02/19/2020    CHF (congestive heart failure) (HCC)     Coronary artery disease     Daytime sleepiness 07/17/2019    Diabetes mellitus ([address])     Dyspnea on exertion 10/11/2021    Edema of both feet 01/23/2020    Gastric bypass status for obesity     Hyperlipidemia     Hypertension     Hypokalemia 11/14/2021    Impaired fasting glucose     last assessed 5/10/17    Knee sprain, bilateral 06/14/2018    Leg cramps 06/16/2022    Obesity     Viral gastroenteritis     last assessed 11/4/16     Present on Admission:   Anemia of chronic disease   Chronic diastolic CHF ([address])   CKD (chronic kidney disease) stage 3 (HCC)   Diabetic polyneuropathy associated with type 2 diabetes mellitus (HCC)   Morbid obesity (HCC)   Paroxysmal atrial fibrillation ([address])   Primary hypertension   (Resolved) Type 2 diabetes mellitus with diabetic polyneuropathy (HCC)   Hyponatremia      Admitting Diagnosis: Shortness of breath [R06.02]  Heart failure ([address]) [I50.9]  Asthma exacerbation [J45.901]  Age/Sex: 66 y.o. male  Admission Orders:  SCD  Continuous Pulse ox      Scheduled Medications:  apixaban, 5 mg, Oral, BID  atorvastatin, 10 mg, Oral, Daily  budesonide-formoterol, 2 puff, Inhalation, BID  Empagliflozin, 10 mg, Oral, Daily  insulin lispro, 1-5 Units, Subcutaneous, HS  insulin lispro, 2-12 Units, Subcutaneous, TID AC  levalbuterol, 1.25 mg, Nebulization, TID  methylPREDNISolone sodium succinate, 40 mg, Intravenous, Q8H ANA  metoprolol succinate, 50 mg, Oral, Daily  pantoprazole, 40 mg, Oral, Early Morning  potassium chloride, 40 mEq, Oral, BID  pregabalin, 50 mg, Oral, HS  spironolactone, 25 mg, Oral, Daily  umeclidinium, 1 puff, Inhalation, Daily      Continuous IV Infusions: none  No current facility-administered medications for this encounter. PRN Meds:  acetaminophen, 975 mg, Oral, Q8H PRN 11/28 x 1        None    Network Utilization Review Department  ATTENTION: Please call with any questions or concerns to [phone] and carefully listen to the prompts so that you are directed to the right person. All voicemails are confidential.   For Discharge needs, contact Care Management DC Support Team at [phone] opt. 2  Send all requests for admission clinical reviews, approved or denied determinations and any other requests to dedicated fax number below belonging to the campus where the patient is receiving treatment.  List of dedicated fax numbers for the Facilities:  [place] DENIALS (Administrative/Medical Necessity) [phone]   DISCHARGE SUPPORT TEAM (NETWORK) [id number] [address] (Maternity/NICU/Pediatrics) [phone]   [address] 1521 [facility] 1000 [facility] [phone] [address] 5220 [facility] [address] [address] [facility] [address] [facility] [address] 1300 [facility] [address] [place] [address] [phone]

## 2023-11-28 NOTE — PLAN OF CARE
Problem: CARDIOVASCULAR - ADULT  Goal: Maintains optimal cardiac output and hemodynamic stability  Description: INTERVENTIONS:  - Monitor I/O, vital signs and rhythm  - Monitor for S/S and trends of decreased cardiac output  - Administer and titrate ordered vasoactive medications to optimize hemodynamic stability  - Assess quality of pulses, skin color and temperature  - Assess for signs of decreased coronary artery perfusion  - Instruct patient to report change in severity of symptoms  Outcome: Progressing  Goal: Absence of cardiac dysrhythmias or at baseline rhythm  Description: INTERVENTIONS:  - Continuous cardiac monitoring, vital signs, obtain 12 lead EKG if ordered  - Administer antiarrhythmic and heart rate control medications as ordered  - Monitor electrolytes and administer replacement therapy as ordered  Outcome: Progressing     Problem: RESPIRATORY - ADULT  Goal: Achieves optimal ventilation and oxygenation  Description: INTERVENTIONS:  - Assess for changes in respiratory status  - Assess for changes in mentation and behavior  - Position to facilitate oxygenation and minimize respiratory effort  - Oxygen administered by appropriate delivery if ordered  - Initiate smoking cessation education as indicated  - Encourage broncho-pulmonary hygiene including cough, deep breathe, Incentive Spirometry  - Assess the need for suctioning and aspirate as needed  - Assess and instruct to report SOB or any respiratory difficulty  - Respiratory Therapy support as indicated  Outcome: Progressing     Problem: METABOLIC, FLUID AND ELECTROLYTES - ADULT  Goal: Electrolytes maintained within normal limits  Description: INTERVENTIONS:  - Monitor labs and assess patient for signs and symptoms of electrolyte imbalances  - Administer electrolyte replacement as ordered  - Monitor response to electrolyte replacements, including repeat lab results as appropriate  - Instruct patient on fluid and nutrition as appropriate  Outcome: Progressing  Goal: Fluid balance maintained  Description: INTERVENTIONS:  - Monitor labs   - Monitor I/O and WT  - Instruct patient on fluid and nutrition as appropriate  - Assess for signs & symptoms of volume excess or deficit  Outcome: Progressing  Goal: Glucose maintained within target range  Description: INTERVENTIONS:  - Monitor Blood Glucose as ordered  - Assess for signs and symptoms of hyperglycemia and hypoglycemia  - Administer ordered medications to maintain glucose within target range  - Assess nutritional intake and initiate nutrition service referral as needed  Outcome: Progressing     Problem: SKIN/TISSUE INTEGRITY - ADULT  Goal: Skin Integrity remains intact(Skin Breakdown Prevention)  Description: Assess:  -Perform Marcelo assessment every 8  -Clean and moisturize skin every 8  -Inspect skin when repositioning, toileting, and assisting with ADLS  -Assess under medical devices such as sequentials device skin every per shift  2  -Assess extremities for adequate circulation and sensation     Bed Management:  -Have minimal linens on bed & keep smooth, unwrinkled  -Change linens as needed when moist or perspiring  -Avoid sitting or lying in one position for more than 2 hours while in bed  -Keep HOB at 45degrees     Toileting:  -Offer bedside commode  -Assess for incontinence every 4  -Use incontinent care products after each incontinent episode such as zygard    Activity:  -Mobilize patient 2 times a day  -Encourage activity and walks on unit  -Encourage or provide ROM exercises   -Turn and reposition patient every 2 Hours  -Use appropriate equipment to lift or move patient in bed  -Instruct/ Assist with weight shifting every 2 when out of bed in chair  -Consider limitation of chair time 2 hour intervals    Skin Care:  -Avoid use of baby powder, tape, friction and shearing, hot water or constrictive clothing  -Relieve pressure over bony prominences using 2  -Do not massage red bony areas    Next Steps:  -Teach patient strategies to minimize risks such as 2   -Consider consults to  interdisciplinary teams such as 2  Outcome: Progressing  Goal: Incision(s), wounds(s) or drain site(s) healing without S/S of infection  Description: INTERVENTIONS  - Assess and document dressing, incision, wound bed, drain sites and surrounding tissue  - Provide patient and family education  - Perform skin care/dressing changes every 2  Outcome: Progressing  Goal: Pressure injury heals and does not worsen  Description: Interventions:  - Implement low air loss mattress or specialty surface (Criteria met)  - Apply silicone foam dressing  - Instruct/assist with weight shifting every 2 minutes when in chair   - Limit chair time to 2 hour intervals  - Use special pressure reducing interventions such as 2 when in chair   - Apply fecal or urinary incontinence containment device   - Perform passive or active ROM every 2  - Turn and reposition patient & offload bony prominences every 2 hours   - Utilize friction reducing device or surface for transfers   - Consider consults to  interdisciplinary teams such as 2  - Use incontinent care products after each incontinent episode such as 2  - Consider nutrition services referral as needed  Outcome: Progressing     Problem: HEMATOLOGIC - ADULT  Goal: Maintains hematologic stability  Description: INTERVENTIONS  - Assess for signs and symptoms of bleeding or hemorrhage  - Monitor labs  - Administer supportive blood products/factors as ordered and appropriate  Outcome: Progressing     Problem: MUSCULOSKELETAL - ADULT  Goal: Maintain or return mobility to safest level of function  Description: INTERVENTIONS:  - Assess patient's ability to carry out ADLs; assess patient's baseline for ADL function and identify physical deficits which impact ability to perform ADLs (bathing, care of mouth/teeth, toileting, grooming, dressing, etc.)  - Assess/evaluate cause of self-care deficits   - Assess range of motion  - Assess patient's mobility  - Assess patient's need for assistive devices and provide as appropriate  - Encourage maximum independence but intervene and supervise when necessary  - Involve family in performance of ADLs  - Assess for home care needs following discharge   - Consider OT consult to assist with ADL evaluation and planning for discharge  - Provide patient education as appropriate  Outcome: Progressing  Goal: Maintain proper alignment of affected body part  Description: INTERVENTIONS:  - Support, maintain and protect limb and body alignment  - Provide patient/ family with appropriate education  Outcome: Progressing

## 2023-11-28 NOTE — UTILIZATION REVIEW
NOTIFICATION OF INPATIENT ADMISSION   AUTHORIZATION REQUEST   SERVICING FACILITY:   [address] [place] [address]  Address: [address] [place] [address], [facility] [address] [facility] [address]  Tax ID: [id number]  NPI: [id number] ATTENDING PROVIDER:  Attending Name and NPI#: Heri Gupta Md [[id number]]  Address: [address] [place] [id number]  Phone: [phone]   ADMISSION INFORMATION:  Place of Service: Inpatient [address]  Place of Service Code: 21  Inpatient Admission Date/Time: 11/27/23  1:39 PM  Discharge Date/Time: No discharge date for patient encounter. Admitting Diagnosis Code/Description:  Shortness of breath [R06.02]  Heart failure ([address]) [I50.9]  Asthma exacerbation [J45.901]     UTILIZATION REVIEW CONTACT:  Nicholas Dsouza Utilization   Network Utilization Review Department  Phone: [phone]  Fax: [phone]  Email: [email or web address]  Contact for approvals/pending authorizations, clinical reviews, and discharge. PHYSICIAN ADVISORY SERVICES:  Medical Necessity Denial & Qcso-aa-Rqdf Review  Phone: [phone]  Fax: [phone]  Email: [email or web address]     DISCHARGE SUPPORT TEAM:  For Patients Discharge Needs & Updates  Phone: [phone] opt. 2 Fax: [phone]  Email: [email or web address]

## 2023-11-28 NOTE — ASSESSMENT & PLAN NOTE
Patient had shortness of breath for the past 3 days  Patient was given serrano-steroids-magnesium  Patient feels better at after the treatment but still had shortness of breath and was admitted  CT chest clear  Solu-Medrol 40 mg q8hrs -changed to oral prednisone 40 mg daily starting 11/29  There is not a strong suspicion of infection, patient has no sick contacts, patient is not having any cough or upper respiratory symptoms. Will hold off on antibiotic therapy at this time  Wheezing has resolved on exam.  Patient ambulating in rodriguez on room air at 96%. Will discharge patient home on oral prednisone. He we will continue his Symbicort, Spiriva, albuterol, and Singulair as an outpatient. Will send an ambulatory referral for outpatient pulmonary follow-up. Will also send referral for pulmonary rehab. Patient was noted to have significant eosinophilia last admission. He has an outpatient appointment with hematology on 11/30.

## 2023-11-28 NOTE — DISCHARGE SUMMARY
[address] [facility]  Discharge- Laurence Macdonald 1965, 66 y.o. male MRN: [id number]  Unit/Bed#: PD4 866-01 Encounter: [id number]  Primary Care Provider: Sundeep Arndt MD   Date and time admitted to hospital: 11/27/2023 10:15 AM    * Severe asthma with acute exacerbation  Assessment & Plan  Patient had shortness of breath for the past 3 days  Patient was given serrano-steroids-magnesium  Patient feels better at after the treatment but still had shortness of breath and was admitted  CT chest clear  Solu-Medrol 40 mg q8hrs -changed to oral prednisone 40 mg daily starting 11/29  There is not a strong suspicion of infection, patient has no sick contacts, patient is not having any cough or upper respiratory symptoms. Will hold off on antibiotic therapy at this time  Wheezing has resolved on exam.  Patient ambulating in rodriguez on room air at 96%. Will discharge patient home on oral prednisone. He we will continue his Symbicort, Spiriva, albuterol, and Singulair as an outpatient. Will send an ambulatory referral for outpatient pulmonary follow-up. Will also send referral for pulmonary rehab. Patient was noted to have significant eosinophilia last admission. He has an outpatient appointment with hematology on 11/30.       Chronic diastolic CHF [facility])  Assessment & Plan  Wt Readings from Last 3 Encounters:   11/07/23 (!) 152 kg (335 lb 12.8 oz)   10/27/23 (!) 154 kg (338 lb 9.6 oz)   10/20/23 (!) 154 kg (338 lb 9.6 oz)   Patient had an echo done on April 2023 with an EF of 50%  Will continue Toprol 50 mg daily  Will continue Jardiance 10 mg daily  Continue Bumex 6 mg twice daily as well as spironolactone 25 mg  Monitor daily weights and strict intake and output  Patient appears to be euvolemic at this time          Morbid obesity ([address])  Assessment & Plan  Counseled patient on lifestyle modification    CKD (chronic kidney disease) stage 3 ([address])  Assessment & Plan  Lab Results   Component Value Date    EGFR 27 11/28/2023    EGFR 28 11/28/2023    EGFR 37 11/27/2023    CREATININE 2.49 (H) 11/28/2023    CREATININE 2.41 (H) 11/28/2023    CREATININE 1.94 (H) 11/27/2023   Patient appears to be at his baseline around 2 though meets ANTONIO criteria with change from 1.94 to 2.49  Did receive IV contrast  Patient noted to be on Bumex as well as spironolactone  Held 1 dose of Bumex  Recommend BMP later this week as outpatient    Hyponatremia  Assessment & Plan  Corrected sodium 131  Bumex was held for 1 dose  Recommend outpatient BMP    Paroxysmal atrial fibrillation ([address])  Assessment & Plan  Patient rate controlled with metoprolol succinate 50 mg daily patient anticoagulated with Eliquis 5 mg twice daily    Primary hypertension  Assessment & Plan  Blood pressure at goal  Will continue Bumex 6 mg twice daily, metoprolol 50 mg daily, spironolactone 25 mg daily  AM Bumex held in setting of hyponatremia    Diabetic polyneuropathy associated with type 2 diabetes mellitus [facility])  Assessment & Plan  Lab Results   Component Value Date    HGBA1C 7.1 (A) 08/09/2023       Recent Labs     11/27/23  2221 11/28/23  0118 11/28/23  0607 11/28/23  1131   POCGLU 478* 402* 319* 358*       Blood Sugar Average: Last 72 hrs:  (P) 408.8  Patient takes Jardiance 10 mg daily as well as Januvia 50 mg daily  Patient received contrast for his CTA and has CKD  Resume meds on discharge    Anemia of chronic disease  Assessment & Plan  Patient is at his baseline hemoglobin of 10      Medical Problems       Resolved Problems  Date Reviewed: 11/28/2023            Resolved    Type 2 diabetes mellitus with diabetic polyneuropathy ([address]) 11/27/2023     Resolved by  Jaja Stubbs MD        Discharging Physician / Practitioner: Darnell La PA-C  PCP: Isabell Patricia MD  Admission Date:   Admission Orders (From admission, onward)       Ordered        11/27/23 1339  INPATIENT ADMISSION  Once                          Discharge Date: 11/28/23    Consultations During Hospital Stay:  none    Procedures Performed:     CTA chest  No acute pulmonary emboli. No focal consolidation. A Cardio mems device in the left lower lobe segmental pulmonary artery. A small amount of fluid and debris within the lower thoracic esophagus, suggesting reflux. This puts the patient at increased risk for aspiration pneumonitis. CXR  No acute cardiopulmonary disease. Significant Findings / Test Results:   See above    Incidental Findings:   none       Test Results Pending at Discharge (will require follow up):   none     Outpatient Tests Requested:  BMP in 1 week    Complications:  none    Reason for Admission: shortness of breath    Hospital Course:   Armando Gupta is a 66 y.o. male patient who originally presented to the hospital on 11/27/2023 due to shortness of breath. Patient has a history of severe asthma. He was given IV steroids and nebulizers in the ED with improvement, but he still continued to feel short of breath. He was admitted and placed on IV Solu-Medrol 40 mg every 8 hours. Patient's symptoms improved. He ambulated on room air at 96%. His wheezing on exam had resolved. Patient will be discharged on oral prednisone 40 mg to complete a 5-day course. Ambulatory referral be sent to pulmonary for follow-up. Will also place a referral for pulmonary rehab. Patient is also scheduled to see hematology on 11/30 to follow-up on his eosinophilia. Discussed weight loss with patient as well. Patient will be discharged home in stable condition. Please see above list of diagnoses and related plan for additional information. Condition at Discharge: good    Discharge Day Visit / Exam:   Subjective: Patient states that shortness of breath is improved. He states he had a little tightness earlier this morning, but was able to ambulate in the hallway at 96% on room air with nursing this afternoon.   Vitals: Blood Pressure: 108/71 (11/28/23 1500)  Pulse: 84 (11/28/23 1500)  Temperature: 98.3 °F (36.8 °C) (11/28/23 1500)  Temp Source: Oral (11/28/23 1500)  Respirations: 13 (11/28/23 1500)  SpO2: 98 % (11/28/23 1500)  Exam:   Physical Exam  Vitals and nursing note reviewed. Constitutional:       General: He is not in acute distress. Appearance: He is well-developed. He is obese. HENT:      Head: Normocephalic and atraumatic. Eyes:      Conjunctiva/sclera: Conjunctivae normal.   Cardiovascular:      Rate and Rhythm: Normal rate and regular rhythm. Heart sounds: No murmur heard. Pulmonary:      Effort: Pulmonary effort is normal. No respiratory distress. Breath sounds: Normal breath sounds. Abdominal:      Palpations: Abdomen is soft. Tenderness: There is no abdominal tenderness. Musculoskeletal:         General: No swelling. Cervical back: Neck supple. Skin:     General: Skin is warm and dry. Neurological:      Mental Status: He is alert. Psychiatric:         Mood and Affect: Mood normal.          Discussion with Family: Updated  (wife) via phone. Discharge instructions/Information to patient and family:   See after visit summary for information provided to patient and family. Provisions for Follow-Up Care:  See after visit summary for information related to follow-up care and any pertinent home health orders. Disposition:   Home    Planned Readmission: none     Discharge Statement:  I spent 40 minutes discharging the patient. This time was spent on the day of discharge. I had direct contact with the patient on the day of discharge. Greater than 50% of the total time was spent examining patient, answering all patient questions, arranging and discussing plan of care with patient as well as directly providing post-discharge instructions. Additional time then spent on discharge activities.     Discharge Medications:  See after visit summary for reconciled discharge medications provided to patient and/or family.       **Please Note: This note may have been constructed using a voice recognition system**

## 2023-11-28 NOTE — ASSESSMENT & PLAN NOTE
Lab Results   Component Value Date    HGBA1C 7.1 (A) 08/09/2023       Recent Labs     11/27/23  2221 11/28/23  0118 11/28/23  0607 11/28/23  1131   POCGLU 478* 402* 319* 358*       Blood Sugar Average: Last 72 hrs:  (P) 408.8  Patient takes Jardiance 10 mg daily as well as Januvia 50 mg daily  Patient received contrast for his CTA and has CKD  Resume meds on discharge

## 2023-11-28 NOTE — ASSESSMENT & PLAN NOTE
Blood pressure at goal  Will continue Bumex 6 mg twice daily, metoprolol 50 mg daily, spironolactone 25 mg daily  AM Bumex held in setting of hyponatremia

## 2023-11-29 ENCOUNTER — TRANSITIONAL CARE MANAGEMENT (OUTPATIENT)
Dept: FAMILY MEDICINE CLINIC | Facility: CLINIC | Age: 58
End: 2023-11-29

## 2023-11-29 NOTE — UTILIZATION REVIEW
NOTIFICATION OF ADMISSION DISCHARGE   This is a Notification of Discharge from [address] [facility]. Please be advised that this patient has been discharge from our facility. Below you will find the admission and discharge date and time including the patient’s disposition. UTILIZATION REVIEW CONTACT:  Mliss Libman  Utilization   Network Utilization Review Department  Phone: [phone] x carefully listen to the prompts. All voicemails are confidential.  Email: [email or web address]     ADMISSION INFORMATION  PRESENTATION DATE: 11/27/2023 10:15 AM  OBERVATION ADMISSION DATE:   INPATIENT ADMISSION DATE: 11/27/23  1:39 PM   DISCHARGE DATE: 11/28/2023  8:55 PM   DISPOSITION:Home/Self Care    Network Utilization Review Department  ATTENTION: Please call with any questions or concerns to [phone] and carefully listen to the prompts so that you are directed to the right person. All voicemails are confidential.   For Discharge needs, contact Care Management DC Support Team at [phone] opt. 2  Send all requests for admission clinical reviews, approved or denied determinations and any other requests to dedicated fax number below belonging to the campus where the patient is receiving treatment.  List of dedicated fax numbers for the Facilities:  [place] DENIALS (Administrative/Medical Necessity) [phone]   DISCHARGE SUPPORT TEAM (Network) [phone] [address] [facility] (Maternity/NICU/Pediatrics) [phone]   [address] [facility] [address] [address] [place] [address] [phone]   [facility] [address] [facility] [phone]   [address] [place] [address]  [address]  [phone]

## 2023-11-30 ENCOUNTER — OFFICE VISIT (OUTPATIENT)
Dept: HEMATOLOGY ONCOLOGY | Facility: CLINIC | Age: 58
End: 2023-11-30
Payer: COMMERCIAL

## 2023-11-30 VITALS
BODY MASS INDEX: 41.75 KG/M2 | DIASTOLIC BLOOD PRESSURE: 84 MMHG | HEIGHT: 73 IN | OXYGEN SATURATION: 98 % | WEIGHT: 315 LBS | HEART RATE: 92 BPM | SYSTOLIC BLOOD PRESSURE: 124 MMHG | RESPIRATION RATE: 15 BRPM | TEMPERATURE: 97.5 F

## 2023-11-30 DIAGNOSIS — D72.10 EOSINOPHILIA, UNSPECIFIED TYPE: Primary | ICD-10-CM

## 2023-11-30 DIAGNOSIS — N18.4 CHRONIC RENAL DISEASE, STAGE IV (HCC): ICD-10-CM

## 2023-11-30 PROCEDURE — 99214 OFFICE O/P EST MOD 30 MIN: CPT | Performed by: INTERNAL MEDICINE

## 2023-11-30 NOTE — PROGRESS NOTES
Ricarda Boswell  1965  [address] [facility]  [address] [place] [id number]    DISCUSSION/SUMMARY:    62-year-old male with history of CHF, chronic anemia, diabetic polyneuropathy, asthma with multiple admissions for exacerbation of asthma. Recent blood work is demonstrated elevated eosinophil levels. Patient also with anemia of unclear etiology. At this moment, it is difficult to connect all of patient's medical issues with a prior COVID-vaccine. Presently Mr. Lazara Mcpherson feels okay, baseline. No recent respiratory issues. Fatigue is the same as before. We discussed options regarding the prior abnormal CBC/differential results. To rule out any primary bone marrow disorder, patient is to go for bone marrow biopsy/aspirate. Results will determine further work-up and treatment. Patient is to return in approximately 6 weeks (after the bone marrow biopsy). Mr. Lazara Mcpherson knows to call the hematology/oncology office if there are any other questions or concerns. Carefully review your medication list and verify that the list is accurate and up-to-date. Please call the hematology/oncology office if there are medications missing from the list, medications on the list that you are not currently taking or if there is a dosage or instruction that is different from how you're taking that medication. Patient goals and areas of care: Bone marrow biopsy  Barriers to care: None  Patient is able to self-care  _____________________________________________________________________________________    Chief Complaint   Patient presents with    Follow-up    Eosinophilia     History of Present Illness: 62-year-old male previously with relatively good health but with a number of medical issues that seem to date back to a COVID vaccine. Mr. Lazara Mcpherson has been admitted to [facility] a number of times with exacerbation of asthma.   Patient has had eosinophilia on a number of CBC/differential recently. Patient was recently readmitted with exacerbation of asthma. Presently Mr. Didier Jackson states feeling okay, baseline. No respiratory issues. No rashes, no hives. No diarrhea, no sweating, no dizziness. Appetite is good, weight is stable. No GI or  issues or bleeding. Activities are about the same as before, somewhat limited. Review of Systems   Constitutional:  Positive for fatigue. HENT: Negative. Eyes: Negative. Respiratory: Negative. Cardiovascular: Negative. Gastrointestinal: Negative. Endocrine: Negative. Genitourinary: Negative. Musculoskeletal: Negative. Skin: Negative. Allergic/Immunologic: Negative. Neurological: Negative. Hematological: Negative. Psychiatric/Behavioral: Negative. All other systems reviewed and are negative.     Patient Active Problem List   Diagnosis    Irritable bowel syndrome with diarrhea    Primary hypertension    Primary osteoarthritis of both knees    Hyperlipidemia    Morbid obesity (HCC)    Vitamin B12 deficiency    Vitamin D deficiency    VICKY (obstructive sleep apnea)    Hyponatremia    Severe persistent asthma with exacerbation    Chronic heart failure with preserved ejection fraction (HFpEF) (HCC)    Paroxysmal atrial fibrillation (HCC)    HNP (herniated nucleus pulposus), lumbar    Foraminal stenosis of lumbar region    Primary osteoarthritis of right knee    CKD (chronic kidney disease) stage 3 (HCC)    Diabetic polyneuropathy associated with type 2 diabetes mellitus (HCC)    Chronic diastolic CHF (HCC)    Loose stools    Severe persistent asthma without complication    Presence of CardioMEMS HF system    Nasal congestion    Anemia of chronic disease    Eosinophilia    Severe asthma with acute exacerbation     Past Medical History:   Diagnosis Date    Acute gastritis without hemorrhage     last assessed 3/24/17    Asthma     Atrial fibrillation (HCC)     Bilateral leg edema 02/19/2020    CHF (congestive heart failure) (HCC)     Coronary artery disease     Daytime sleepiness 2019    Diabetes mellitus ([address])     Dyspnea on exertion 10/11/2021    Edema of both feet 2020    Gastric bypass status for obesity     Hyperlipidemia     Hypertension     Hypokalemia 2021    Impaired fasting glucose     last assessed 5/10/17    Knee sprain, bilateral 2018    Leg cramps 2022    Obesity     Viral gastroenteritis     last assessed 16     Past Surgical History:   Procedure Laterality Date    CARDIAC CATHETERIZATION N/A 3/9/2022    Procedure: CARDIAC RHC W/ PRESSURE SENSOR;  Surgeon: Reva Cortes MD;  Location: [facility]; Service: Cardiology    CARDIAC CATHETERIZATION N/A 2022    Procedure: Cardiac catheterization;  Surgeon: May Beckman DO;  [facility]: [facility]; Service: Cardiology    CARDIAC CATHETERIZATION N/A 2022    Procedure: Cardiac Coronary Angiogram;  Surgeon: May Beckman DO;  [facility]; Service: Cardiology    CARDIAC CATHETERIZATION N/A 10/11/2023    Procedure: Cardiac RHC; Surgeon: Diomedes Sandoval MD;  Location: [facility]ARD[facility];   Service: Cardiology    CARPAL TUNNEL RELEASE      bilateral    GASTRIC BYPASS  2004    with han en y    HERNIA REPAIR      ventral inscisional    TONSILLECTOMY       Family History   Problem Relation Age of Onset    Stroke Mother     Hypertension Father     Cancer Father     COPD Father      Social History     Socioeconomic History    Marital status: /Civil Union     Spouse name: Not on file    Number of children: Not on file    Years of education: Not on file    Highest education level: Not on file   Occupational History    Not on file   Tobacco Use    Smoking status: Former     Types: Pipe, Cigars     Start date:      Quit date: 2021     Years since quittin.9    Smokeless tobacco: Never    Tobacco comments:     cigar once a day   Vaping Use    Vaping Use: Never used   Substance and Sexual Activity    Alcohol use: Yes     Alcohol/week: 2.0 standard drinks of alcohol     Types: 2 Shots of liquor per week     Comment: social    Drug use: No    Sexual activity: Yes     Partners: Female     Birth control/protection: None   Other Topics Concern    Not on file   Social History Narrative    Not on file     Social Determinants of Health     Financial Resource Strain: Not on file   Food Insecurity: No Food Insecurity (10/17/2023)    Hunger Vital Sign     Worried About Running Out of Food in the Last Year: Never true     Ran Out of Food in the Last Year: Never true   Transportation Needs: No Transportation Needs (10/17/2023)    PRAPARE - Transportation     Lack of Transportation (Medical): No     Lack of Transportation (Non-Medical):  No   Physical Activity: Not on file   Stress: Not on file   Social Connections: Not on file   Intimate Partner Violence: Not on file   Housing Stability: Low Risk  (10/17/2023)    Housing Stability Vital Sign     Unable to Pay for Housing in the Last Year: No     Number of Places Lived in the Last Year: 1     Unstable Housing in the Last Year: No       Current Outpatient Medications:     Accu-Chek FastClix Lancets MISC, Test blood sugar twice daily, Disp: 200 each, Rfl: 3    albuterol (PROVENTIL HFA,VENTOLIN HFA) 90 mcg/act inhaler, Inhale 2 puffs every 4 (four) hours as needed for wheezing or shortness of breath, Disp: 18 g, Rfl: 0    apixaban (Eliquis) 5 mg, Take 1 tablet (5 mg total) by mouth 2 (two) times a day, Disp: 60 tablet, Rfl: 5    atorvastatin (LIPITOR) 10 mg tablet, TAKE 1 TABLET BY MOUTH EVERY DAY, Disp: 30 tablet, Rfl: 8    Blood Glucose Monitoring Suppl (Accu-Chek Guide) w/Device KIT, Use 2 (two) times a day Test blood sugar twice daily, Disp: 1 kit, Rfl: 0    budesonide-formoterol (Symbicort) 160-4.5 mcg/act inhaler, Inhale 2 puffs 2 (two) times a day Rinse mouth after use., Disp: 30.6 g, Rfl: 2    bumetanide (BUMEX) 2 mg tablet, Take 3 tablets (6 mg total) by mouth 2 (two) times a day, Disp: 540 tablet, Rfl: 1    Empagliflozin (JARDIANCE) 10 MG TABS tablet, Take 1 tablet (10 mg total) by mouth daily, Disp: 30 tablet, Rfl: 11    EPINEPHrine (EPIPEN) 0.3 mg/0.3 mL SOAJ, Inject 0.3 mL (0.3 mg total) into a muscle once for 1 dose, Disp: 0.6 mL, Rfl: 0    Fasenra Pen 30 MG/ML SOAJ, , Disp: , Rfl:     fluticasone (FLONASE) 50 mcg/act nasal spray, 1 spray into each nostril 2 (two) times a day, Disp: , Rfl: 0    loratadine (CLARITIN) 10 mg tablet, Take 1 tablet (10 mg total) by mouth daily Do not start before October 13, 2023., Disp: , Rfl: 0    metolazone (ZAROXOLYN) 2.5 mg tablet, Take 20-30 minutes before Bumex dose and with additional potassium. Take for weight gain of 2-3 lbs in 24 hours, 5lbs in one week or signs of worsening fluid retention. , Disp: 90 tablet, Rfl: 0    metoprolol succinate (TOPROL-XL) 50 mg 24 hr tablet, TAKE ONE (1) TABLET BY MOUTH DAILY, Disp: 90 tablet, Rfl: 3    montelukast (SINGULAIR) 10 mg tablet, Take 1 tablet (10 mg total) by mouth daily at bedtime, Disp: 90 tablet, Rfl: 1    pantoprazole (PROTONIX) 40 mg tablet, Take 1 tablet (40 mg total) by mouth daily, Disp: 21 tablet, Rfl: 0    potassium chloride (K-DUR,KLOR-CON) 20 mEq tablet, Take 2 tablets (40 mEq total) by mouth 2 (two) times a day, Disp: 120 tablet, Rfl: 0    predniSONE 20 mg tablet, Take 2 tablets (40 mg total) by mouth daily for 3 doses Do not start before November 29, 2023., Disp: 6 tablet, Rfl: 0    pregabalin (LYRICA) 50 mg capsule, Take 1 caps. at bedtime, Disp: 30 capsule, Rfl: 5    sitaGLIPtin (Januvia) 100 mg tablet, TAKE 1/2 TABLET DAILY (Patient taking differently: 50 mg daily TAKE 1/2 TABLET DAILY), Disp: 15 tablet, Rfl: 5    Spiriva Respimat 1.25 MCG/ACT AERS inhaler, INHALE 2 PUFFS BY MOUTH EVERY DAY, Disp: 4 g, Rfl: 5    spironolactone (ALDACTONE) 25 mg tablet, TAKE 1 TABLET (25 MG TOTAL) BY MOUTH DAILY. , Disp: 90 tablet, Rfl: 2    Allergies Allergen Reactions    Azithromycin Other (See Comments)     Shaky, uneasy feeling     Bactrim [Sulfamethoxazole-Trimethoprim] Other (See Comments)     shakiness       Vitals:    11/30/23 0904   BP: 124/84   Pulse: 92   Resp: 15   Temp: 97.5 °F (36.4 °C)   SpO2: 98%     Physical Exam  Constitutional:       Appearance: He is well-developed. Comments: Obese male, no respiratory distress, no signs of pain   HENT:      Head: Normocephalic and atraumatic. Right Ear: External ear normal.      Left Ear: External ear normal.   Eyes:      Conjunctiva/sclera: Conjunctivae normal.      Pupils: Pupils are equal, round, and reactive to light. Cardiovascular:      Rate and Rhythm: Normal rate and regular rhythm. Heart sounds: Normal heart sounds. Pulmonary:      Effort: Pulmonary effort is normal.      Breath sounds: Normal breath sounds. Comments: Send breath sounds, clear  Abdominal:      General: Bowel sounds are normal.      Palpations: Abdomen is soft. Comments: Morbidly obese, + bowel sounds, cannot palpate liver or spleen, no guarding, nontender   Musculoskeletal:         General: Normal range of motion. Cervical back: Normal range of motion and neck supple. Skin:     General: Skin is warm. Neurological:      Mental Status: He is alert and oriented to person, place, and time. Deep Tendon Reflexes: Reflexes are normal and symmetric. Psychiatric:         Behavior: Behavior normal.         Thought Content:  Thought content normal.         Judgment: Judgment normal.     Extremities: 1+ bilateral lower extreme edema plus obesity, no cords, pulses are 1+  Lymphatics: No adenopathy in the neck, supraclavicular region, axilla bilaterally    Labs        11/28/2023 differential: Neutrophil = 82% bands = 17% lymphocyte = 1% monocyte = 0% eosinophil = 0% basophil = 0%    11/28/2023 BUN = 47 creatinine = 2.49

## 2023-12-01 ENCOUNTER — CLINICAL SUPPORT (OUTPATIENT)
Dept: CARDIOLOGY CLINIC | Facility: CLINIC | Age: 58
End: 2023-12-01
Payer: COMMERCIAL

## 2023-12-01 DIAGNOSIS — I50.32 CHRONIC HEART FAILURE WITH PRESERVED EJECTION FRACTION (HCC): Primary | ICD-10-CM

## 2023-12-01 PROCEDURE — 93264 REM MNTR WRLS P-ART PRS SNR: CPT | Performed by: INTERNAL MEDICINE

## 2023-12-06 NOTE — PRE-PROCEDURE INSTRUCTIONS
Pre-procedure Instructions for Interventional Radiology  [address]  [address] [address] [place]  [phone]    You are scheduled for a/an Bone Marrow Biopsy. On Thursday 12/14/23. Your tentative arrival time is 0730. Short stay will notify you the day before your procedure with the exact arrival time and the location to arrive. To prepare for your procedure:  Please arrange for someone to drive you home after the procedure and stay with you until the next morning if you are instructed to do so. This is typically for patients receiving some type of sedative or anesthetic for the procedure. DO NOT EAT OR DRINK ANYTHING after midnight on the evening before your procedure including candy & gum. ONLY SIPS OF WATER with your medications are allowed on the morning of your procedure. TAKE ALL OF YOUR REGULAR MEDICATIONS THE MORNING OF YOUR PROCEDURE with sips of water! We may call you to stop some of your blood sugar, blood pressure and blood thinning medications depending on the procedure. Please take all of these medications unless we instruct you to stop them. If you have an allergy to x-ray dye, please contact Interventional Radiology for an x-ray dye preparation which usually consists of an oral steroid and Benadryl. The day of your procedure:  Bring a list of the medications you take at home. Bring medications you take for breathing problems (such as inhalers), medications for chest pain, or both. Bring a case for your glasses or contacts. Bring your insurance card and a form of photo ID. Please leave all valuables such as credit cards and jewelry at home. Report to the registration desk in the main lobby at the [facility]. Ask to be directed to [facility]. While your procedure is being performed, your family may wait in the Radiology Waiting Room on the 1st floor in Radiology.   if they need to leave, they may provide a number to be called following the procedure. Be prepared to stay overnight just in case. Sometimes procedures will indicate the need for further observation or treatment. If you are scheduled for a follow-up visit with the Interventional Radiologist after your procedure, you will be called with a date and time.     Special Instructions (Medications to stop taking before your procedure etc.):  Hold PO diabetic meds AM 12/14/23

## 2023-12-10 PROBLEM — J45.50 SEVERE PERSISTENT ASTHMA WITHOUT COMPLICATION: Status: RESOLVED | Noted: 2023-05-01 | Resolved: 2023-12-10

## 2023-12-10 PROBLEM — J45.901 SEVERE ASTHMA WITH ACUTE EXACERBATION: Status: RESOLVED | Noted: 2023-11-27 | Resolved: 2023-12-10

## 2023-12-13 ENCOUNTER — OFFICE VISIT (OUTPATIENT)
Dept: FAMILY MEDICINE CLINIC | Facility: CLINIC | Age: 58
End: 2023-12-13
Payer: COMMERCIAL

## 2023-12-13 VITALS
TEMPERATURE: 98 F | BODY MASS INDEX: 41.75 KG/M2 | RESPIRATION RATE: 18 BRPM | OXYGEN SATURATION: 98 % | HEIGHT: 73 IN | SYSTOLIC BLOOD PRESSURE: 120 MMHG | HEART RATE: 80 BPM | WEIGHT: 315 LBS | DIASTOLIC BLOOD PRESSURE: 72 MMHG

## 2023-12-13 DIAGNOSIS — I48.0 PAROXYSMAL ATRIAL FIBRILLATION (HCC): Chronic | ICD-10-CM

## 2023-12-13 DIAGNOSIS — E11.42 DIABETIC POLYNEUROPATHY ASSOCIATED WITH TYPE 2 DIABETES MELLITUS (HCC): ICD-10-CM

## 2023-12-13 DIAGNOSIS — E11.65 TYPE 2 DIABETES MELLITUS WITH HYPERGLYCEMIA, WITHOUT LONG-TERM CURRENT USE OF INSULIN (HCC): ICD-10-CM

## 2023-12-13 DIAGNOSIS — I10 PRIMARY HYPERTENSION: ICD-10-CM

## 2023-12-13 DIAGNOSIS — D72.10 EOSINOPHILIA, UNSPECIFIED TYPE: ICD-10-CM

## 2023-12-13 DIAGNOSIS — N18.4 CHRONIC RENAL DISEASE, STAGE IV (HCC): ICD-10-CM

## 2023-12-13 DIAGNOSIS — J45.51 SEVERE PERSISTENT ASTHMA WITH EXACERBATION: Primary | ICD-10-CM

## 2023-12-13 DIAGNOSIS — I50.32 CHRONIC DIASTOLIC CONGESTIVE HEART FAILURE (HCC): ICD-10-CM

## 2023-12-13 DIAGNOSIS — E87.1 HYPONATREMIA: ICD-10-CM

## 2023-12-13 PROBLEM — E11.9 TYPE 2 DIABETES MELLITUS, WITHOUT LONG-TERM CURRENT USE OF INSULIN (HCC): Status: RESOLVED | Noted: 2018-01-30 | Resolved: 2023-12-13

## 2023-12-13 PROCEDURE — 99495 TRANSJ CARE MGMT MOD F2F 14D: CPT | Performed by: FAMILY MEDICINE

## 2023-12-13 NOTE — PROGRESS NOTES
Assessment & Plan     1. Severe persistent asthma with exacerbation  Assessment & Plan:  Patient reports improvement in symptoms. Completed oral Prednisone course. Patient is currently using Symbicort 164.5 mcg 2 puffs twice daily, Spiriva inhaler, taking Singulair 10 mg at bedtime. Symbicort is not on formulary. Recommended patient to contact pulmonology to discuss changing inhalers. 2. Primary hypertension  Assessment & Plan:  BP is well controlled. Continue current medical regimen. Follow-up with cardiology Dr. Melisa Prescott. 3. Paroxysmal atrial fibrillation [facility])  Assessment & Plan:  Patient is asymptomatic. Continue Metoprolol ER 50 mg 1 tablet daily, anticoagulation with Eliquis 5 mg twice daily. 4. Chronic diastolic CHF [facility])  Assessment & Plan:  Wt Readings from Last 3 Encounters:   12/13/23 (!) 157 kg (346 lb 3.2 oz)   11/30/23 (!) 150 kg (330 lb)   11/07/23 (!) 152 kg (335 lb 12.8 oz)     Patient appears euvolemic on exam.    Continue Bumex, Metoprolol ER, Jardiance. Check daily weights. Follow-up with cardiology Dr. Melisa Prescott. Orders:  -     Basic metabolic panel; Future; Expected date: 12/20/2023    5. Chronic renal disease, stage IV [facility])  Assessment & Plan:  Lab Results   Component Value Date    EGFR 27 11/28/2023    EGFR 28 11/28/2023    EGFR 37 11/27/2023    CREATININE 2.49 (H) 11/28/2023    CREATININE 2.41 (H) 11/28/2023    CREATININE 1.94 (H) 11/27/2023     Blood work done on November 28, 2023 showed creatinine 2.49, GFR 27. Avoid nephrotoxins. Recheck BMP in 1 week. Schedule follow-up visit with nephrology Dr. Raisa Madison. Orders:  -     Basic metabolic panel; Future; Expected date: 12/20/2023    6. Eosinophilia, unspecified type  Assessment & Plan:  Patient has eosinophilia, anemia, was seen by hematology Dr. Soraya Muhammad on November 30, 2023. Scheduled for bone marrow biopsy tomorrow.       7. Type 2 diabetes mellitus with hyperglycemia, without long-term current use of insulin (HCC)  Assessment & Plan:    Lab Results   Component Value Date    HGBA1C 7.1 (A) 08/09/2023     Continue Januvia 50 mg daily, Jardiance 10 mg daily. Follow a low-carb diet, work on weight loss. Check eye exam by ophthalmology annually. Orders:  -     Albumin / creatinine urine ratio    8. Diabetic polyneuropathy associated with type 2 diabetes mellitus (HCC)  Assessment & Plan:    Lab Results   Component Value Date    HGBA1C 7.1 (A) 08/09/2023     Continue Januvia 50 mg daily, Jardiance 10 mg daily. Continue Lyrica 50 mg daily for neuropathy. Stressed the importance of keeping blood sugar under control to prevent worsening neurological complications. 9. Hyponatremia  Assessment & Plan:  Chronic hyponatremia. Patient is Bumex 6 mg twice daily for congestive heart failure. Schedule follow-up visit with nephrology Dr. Cindy Park. Orders:  -     Basic metabolic panel; Future; Expected date: 12/20/2023        Depression Screening and Follow-up Plan: Patient was screened for depression during today's encounter. They screened negative with a PHQ-2 score of 0. Schedule follow-up visit/ physical exam in 2 months. Subjective     Transitional Care Management Review:   Zoey Dewitt is a 66 y.o. male here for TCM follow up. During the TCM phone call patient stated:  TCM Call       Date and time call was made  11/29/2023 11:11 AM    Hospital care reviewed  Records reviewed    Patient was hospitialized at  [facility]    Date of Admission  11/27/23    Date of discharge  11/28/23    Diagnosis  Severe asthma with acute exacerbation    Disposition  Home    Were the patients medications reviewed and updated  No    Current Symptoms  None    Shortness of breath severity  Moderate          TCM Call       Post hospital issues  None    Should patient be enrolled in anticoag monitoring? No    Scheduled for follow up?   Yes    Patients specialists  Cardiologist Cardiologist name  Cornelio Donnelly MD    Cardiologist contact #  [phone]    Did you obtain your prescribed medications  Yes    Do you need help managing your prescriptions or medications  No    Is transportation to your appointment needed  No    I have advised the patient to call PCP with any new or worsening symptoms  [address] members    Support System  Family    The type of support provided  None    Do you have social support  Yes, some    Are you recieving any outpatient services  No    Are you recieving home care services  No    Are you using any community resources  No    Current waiver services  No    Have you fallen in the last 12 months  No    Interperter language line needed  No    Counseling  Patient          HPI    Patient presents for TCM visit. Patient was admitted to [facility] 11/27/23-11/2823 for severe asthma with acute exacerbation. Reviewed hospital records, test results, discharge medications with patient    CTA chest showed no acute pulmonary emboli. No focal consolidation. Patient was treated with IV Solu-Medrol with improvement in symptoms. He was discharged home on oral Prednisone 40 mg daily to complete 5-day course. Patient reports improvement in shortness of breath. Denies chest pain, cough, wheezing. Blood test done on November 28, 2023. Potassium 5.2, creatinine 2.49, GFR 27, Hb 9.7. Sodium 127. Patient was found to have elevated eosinophils, was referred to hematology for evaluation. He was seen by hematology Dr. Steven Torres on November 30, 2023, scheduled for bone marrow biopsy tomorrow. Denies family H/o blood disorders. HTN/ CHF- BP remains stable. Denies chest pain, dizziness. Patient checks weight at home daily. Reports no weight gain. Currently taking Bumex 6 mg twice daily, Spironolactone 25 mg daily, Jardiance 10 mg daily. Followed by cardiology Dr. Jaimee Cuello.     Type 2 DM - blood sugars were elevated due to asthma exacerbation treatment with IV steroids and taking oral Prednisone. Patient states that his blood sugar readings improving after he finished oral Prednisone course. Currently taking Januvia 50 mg daily, Jardiance 10 mg daily. CKD -  previously followed by nephrology Dr. Maddy Connolly. Review of Systems   Constitutional:  Positive for fatigue (mild). Negative for activity change, appetite change and chills. HENT:  Negative for congestion, ear pain, sore throat and trouble swallowing. Eyes: Negative. Respiratory:  Positive for shortness of breath (improved). Negative for cough, chest tightness and wheezing. Cardiovascular:  Positive for leg swelling (improved). Negative for chest pain and palpitations. Gastrointestinal:  Negative for abdominal pain, blood in stool, constipation, diarrhea, nausea and vomiting. Genitourinary:  Positive for frequency. Negative for difficulty urinating, dysuria and hematuria. Decreased urine volume: arthritic pain in knees. Musculoskeletal:  Positive for arthralgias. Negative for back pain, gait problem and joint swelling. Skin:  Negative for rash. Neurological:  Positive for numbness. Negative for dizziness, syncope and headaches (in feet). Hematological:  Bruises/bleeds easily. Psychiatric/Behavioral:  Negative for dysphoric mood and sleep disturbance. The patient is not nervous/anxious. Objective     /72   Pulse 80   Temp 98 °F (36.7 °C) (Tympanic)   Resp 18   Ht 6' 1" (1.854 m)   Wt (!) 157 kg (346 lb 3.2 oz)   SpO2 98%   BMI 45.68 kg/m²      Physical Exam  Vitals and nursing note reviewed. Constitutional:       Appearance: Normal appearance. Comments: Morbidly obese   HENT:      Head: Normocephalic and atraumatic. Eyes:      Conjunctiva/sclera: Conjunctivae normal.      Pupils: Pupils are equal, round, and reactive to light. Neck:      Vascular: No carotid bruit.    Cardiovascular:      Rate and Rhythm: Normal rate and regular rhythm. Heart sounds: No murmur heard. Comments: Trace BL LE pretibial edema  Pulmonary:      Effort: Pulmonary effort is normal.      Breath sounds: Normal breath sounds. Abdominal:      General: There is no distension. Palpations: Abdomen is soft. Tenderness: There is no abdominal tenderness. Musculoskeletal:         General: No swelling. Normal range of motion. Cervical back: Normal range of motion and neck supple. Skin:     General: Skin is warm and dry. Findings: No rash. Neurological:      Mental Status: He is alert.    Psychiatric:         Mood and Affect: Mood normal.       Medications have been reviewed by provider in current encounter    Berna Meredith MD

## 2023-12-14 ENCOUNTER — HOSPITAL ENCOUNTER (OUTPATIENT)
Dept: RADIOLOGY | Facility: HOSPITAL | Age: 58
Discharge: HOME | End: 2023-12-14
Attending: INTERNAL MEDICINE
Payer: COMMERCIAL

## 2023-12-14 VITALS
RESPIRATION RATE: 18 BRPM | OXYGEN SATURATION: 98 % | DIASTOLIC BLOOD PRESSURE: 66 MMHG | TEMPERATURE: 97.9 F | SYSTOLIC BLOOD PRESSURE: 139 MMHG | HEART RATE: 78 BPM

## 2023-12-14 DIAGNOSIS — D72.10 EOSINOPHILIA, UNSPECIFIED TYPE: ICD-10-CM

## 2023-12-14 LAB
ERYTHROCYTE [DISTWIDTH] IN BLOOD BY AUTOMATED COUNT: 17.3 % (ref 11.6–15.1)
HCT VFR BLD AUTO: 35.1 % (ref 36.5–49.3)
HGB BLD-MCNC: 10.9 G/DL (ref 12–17)
MCH RBC QN AUTO: 26.2 PG (ref 26.8–34.3)
MCHC RBC AUTO-ENTMCNC: 31.1 G/DL (ref 31.4–37.4)
MCV RBC AUTO: 84 FL (ref 82–98)
NRBC BLD AUTO-RTO: 0 /100 WBCS
PLATELET # BLD AUTO: 287 THOUSANDS/UL (ref 149–390)
PMV BLD AUTO: 10.5 FL (ref 8.9–12.7)
RBC # BLD AUTO: 4.16 MILLION/UL (ref 3.88–5.62)
WBC # BLD AUTO: 6.81 THOUSAND/UL (ref 4.31–10.16)

## 2023-12-14 PROCEDURE — C1830 POWER BONE MARROW BX NEEDLE: HCPCS | Performed by: PHYSICIAN ASSISTANT

## 2023-12-14 PROCEDURE — 99153 MOD SED SAME PHYS/QHP EA: CPT

## 2023-12-14 PROCEDURE — 38222 DX BONE MARROW BX & ASPIR: CPT

## 2023-12-14 PROCEDURE — 88374 M/PHMTRC ALYS ISHQUANT/SEMIQ: CPT | Performed by: INTERNAL MEDICINE

## 2023-12-14 PROCEDURE — 88374 M/PHMTRC ALYS ISHQUANT/SEMIQ: CPT

## 2023-12-14 PROCEDURE — 99152 MOD SED SAME PHYS/QHP 5/>YRS: CPT

## 2023-12-14 RX ORDER — SODIUM CHLORIDE 9 MG/ML
75 INJECTION, SOLUTION INTRAVENOUS CONTINUOUS
Status: DISCONTINUED | OUTPATIENT
Start: 2023-12-14 | End: 2023-12-15 | Stop reason: HOSPADM

## 2023-12-14 RX ORDER — MIDAZOLAM HYDROCHLORIDE 2 MG/2ML
INJECTION, SOLUTION INTRAMUSCULAR; INTRAVENOUS AS NEEDED
Status: COMPLETED | OUTPATIENT
Start: 2023-12-14 | End: 2023-12-14

## 2023-12-14 RX ORDER — FENTANYL CITRATE 50 UG/ML
INJECTION, SOLUTION INTRAMUSCULAR; INTRAVENOUS AS NEEDED
Status: COMPLETED | OUTPATIENT
Start: 2023-12-14 | End: 2023-12-14

## 2023-12-14 RX ORDER — LIDOCAINE HYDROCHLORIDE 10 MG/ML
INJECTION, SOLUTION EPIDURAL; INFILTRATION; INTRACAUDAL; PERINEURAL AS NEEDED
Status: COMPLETED | OUTPATIENT
Start: 2023-12-14 | End: 2023-12-14

## 2023-12-14 RX ADMIN — FENTANYL CITRATE 50 MCG: 50 INJECTION INTRAMUSCULAR; INTRAVENOUS at 08:41

## 2023-12-14 RX ADMIN — FENTANYL CITRATE 50 MCG: 50 INJECTION INTRAMUSCULAR; INTRAVENOUS at 08:56

## 2023-12-14 RX ADMIN — MIDAZOLAM 1 MG: 1 INJECTION INTRAMUSCULAR; INTRAVENOUS at 08:41

## 2023-12-14 RX ADMIN — FENTANYL CITRATE 50 MCG: 50 INJECTION INTRAMUSCULAR; INTRAVENOUS at 09:01

## 2023-12-14 RX ADMIN — MIDAZOLAM 1 MG: 1 INJECTION INTRAMUSCULAR; INTRAVENOUS at 08:47

## 2023-12-14 RX ADMIN — LIDOCAINE HYDROCHLORIDE 10 ML: 10 INJECTION, SOLUTION EPIDURAL; INFILTRATION; INTRACAUDAL; PERINEURAL at 08:53

## 2023-12-14 RX ADMIN — SODIUM CHLORIDE 75 ML/HR: 0.9 INJECTION, SOLUTION INTRAVENOUS at 07:38

## 2023-12-14 RX ADMIN — MIDAZOLAM 1 MG: 1 INJECTION INTRAMUSCULAR; INTRAVENOUS at 08:56

## 2023-12-14 RX ADMIN — MIDAZOLAM 1 MG: 1 INJECTION INTRAMUSCULAR; INTRAVENOUS at 09:01

## 2023-12-14 RX ADMIN — FENTANYL CITRATE 50 MCG: 50 INJECTION INTRAMUSCULAR; INTRAVENOUS at 08:47

## 2023-12-14 NOTE — INTERVAL H&P NOTE
H&P reviewed. There have been no interval changes since the time the H&P was written. /94   Pulse 86   Temp 98.3 °F (36.8 °C) (Oral)   Resp 20   SpO2 100%     Prior imaging was reviewed. Discussed bone marrow biopsy with patient. Risks, benefits, and alternatives reviewed. Patient wishes to proceed. Informed written consent was obtained.     Shweta Nieto PA-C

## 2023-12-14 NOTE — ASSESSMENT & PLAN NOTE
Lab Results   Component Value Date    HGBA1C 7.1 (A) 08/09/2023     Continue Januvia 50 mg daily, Jardiance 10 mg daily. Continue Lyrica 50 mg daily for neuropathy. Stressed the importance of keeping blood sugar under control to prevent worsening neurological complications.

## 2023-12-14 NOTE — ASSESSMENT & PLAN NOTE
Patient is asymptomatic. Continue Metoprolol ER 50 mg 1 tablet daily, anticoagulation with Eliquis 5 mg twice daily.

## 2023-12-14 NOTE — ASSESSMENT & PLAN NOTE
Patient reports improvement in symptoms. Completed oral Prednisone course. Patient is currently using Symbicort 164.5 mcg 2 puffs twice daily, Spiriva inhaler, taking Singulair 10 mg at bedtime. Symbicort is not on formulary. Recommended patient to contact pulmonology to discuss changing inhalers.

## 2023-12-14 NOTE — BRIEF OP NOTE (RAD/CATH)
IR BIOPSY BONE MARROW Procedure Note    PATIENT NAME: Ema Starkey  : 1965  MRN: [id number]    Pre-op Diagnosis:   1. Eosinophilia, unspecified type      Post-op Diagnosis:   1.  Eosinophilia, unspecified type        Provider:   Yasmeen Cabral PA-C  Assistants:     No qualified resident was available, Resident is only observing    Estimated Blood Loss: Minimal    Findings: Right posterior iliac crest bone marrow biopsy with fluoroscopic guidance    Specimens: aspirate and core    Complications:  None immediately    Anesthesia: conscious sedation and local    Yasmeen Cabral PA-C     Date: 2023  Time: 9:10 AM

## 2023-12-14 NOTE — ASSESSMENT & PLAN NOTE
Lab Results   Component Value Date    EGFR 27 11/28/2023    EGFR 28 11/28/2023    EGFR 37 11/27/2023    CREATININE 2.49 (H) 11/28/2023    CREATININE 2.41 (H) 11/28/2023    CREATININE 1.94 (H) 11/27/2023     Blood work done on November 28, 2023 showed creatinine 2.49, GFR 27. Avoid nephrotoxins. Recheck BMP in 1 week. Schedule follow-up visit with nephrology Dr. Isac Mcgill.

## 2023-12-14 NOTE — ASSESSMENT & PLAN NOTE
Wt Readings from Last 3 Encounters:   12/13/23 (!) 157 kg (346 lb 3.2 oz)   11/30/23 (!) 150 kg (330 lb)   11/07/23 (!) 152 kg (335 lb 12.8 oz)     Patient appears euvolemic on exam.    Continue Bumex, Metoprolol ER, Jardiance. Check daily weights. Follow-up with cardiology Dr. Robert Armenta.

## 2023-12-14 NOTE — ASSESSMENT & PLAN NOTE
Chronic hyponatremia. Patient is Bumex 6 mg twice daily for congestive heart failure. Schedule follow-up visit with nephrology Dr. Alfred Leyva.

## 2023-12-14 NOTE — ASSESSMENT & PLAN NOTE
Patient has eosinophilia, anemia, was seen by hematology Dr. Stacie Pinto on November 30, 2023. Scheduled for bone marrow biopsy tomorrow.

## 2023-12-14 NOTE — SEDATION DOCUMENTATION
Bone marrow biopsy completed by Titus Burgos PA-C, patient tolerated procedure well. Right lower back puncture site dressed with a band-aid. 1 hour bedrest start time 0915. Report given to [facility].

## 2023-12-14 NOTE — ASSESSMENT & PLAN NOTE
Lab Results   Component Value Date    HGBA1C 7.1 (A) 08/09/2023     Continue Januvia 50 mg daily, Jardiance 10 mg daily. Follow a low-carb diet, work on weight loss. Check eye exam by ophthalmology annually.

## 2023-12-14 NOTE — ASSESSMENT & PLAN NOTE
BP is well controlled. Continue current medical regimen. Follow-up with cardiology Dr. Jordon Rodas.

## 2023-12-14 NOTE — DISCHARGE INSTRUCTIONS
Bone Marrow Biopsy     WHAT YOU NEED TO KNOW:   A bone marrow biopsy is a procedure to remove a small amount of bone marrow from your bone. Bone marrow is the soft tissue inside your bone that helps to make blood cells. The sample is tested for disease or infection. DISCHARGE INSTRUCTIONS:     1. Limit your activities day of biopsy as directed by your doctor. 2. Use medication as ordered. 3. Return to your normal diet. Small sips of flat soda will help with nausea. 4. Remove band-aid or dressing 24 hours after procedure. Contact Interventional Radiology at [phone] Jazmin PATIENTS: Contact Interventional Radiology at [phone]) Morelia Draper PATIENTS: Contact Interventional Radiology at [phone]) if:    1. Difficulty breathing, nausea or vomiting. 2. Chills or fever above 101 F.    3. Pain at biopsy site not relieved by medication. 4. Develop any redness, swelling, heat, unusual drainage, heavy bruising or bleeding from biopsy site.

## 2023-12-15 ENCOUNTER — OFFICE VISIT (OUTPATIENT)
Dept: CARDIOLOGY CLINIC | Facility: CLINIC | Age: 58
End: 2023-12-15
Payer: COMMERCIAL

## 2023-12-15 ENCOUNTER — TELEPHONE (OUTPATIENT)
Dept: CARDIOLOGY CLINIC | Facility: CLINIC | Age: 58
End: 2023-12-15

## 2023-12-15 ENCOUNTER — APPOINTMENT (OUTPATIENT)
Dept: LAB | Facility: CLINIC | Age: 58
End: 2023-12-15
Payer: COMMERCIAL

## 2023-12-15 VITALS
HEART RATE: 80 BPM | BODY MASS INDEX: 41.75 KG/M2 | DIASTOLIC BLOOD PRESSURE: 66 MMHG | SYSTOLIC BLOOD PRESSURE: 118 MMHG | HEIGHT: 73 IN | WEIGHT: 315 LBS | OXYGEN SATURATION: 99 %

## 2023-12-15 DIAGNOSIS — I50.30 DIASTOLIC CONGESTIVE HEART FAILURE, UNSPECIFIED HF CHRONICITY (HCC): ICD-10-CM

## 2023-12-15 DIAGNOSIS — N18.4 CHRONIC RENAL DISEASE, STAGE IV (HCC): ICD-10-CM

## 2023-12-15 DIAGNOSIS — I50.30 DIASTOLIC CONGESTIVE HEART FAILURE, UNSPECIFIED HF CHRONICITY (HCC): Primary | ICD-10-CM

## 2023-12-15 DIAGNOSIS — I50.32 CHRONIC DIASTOLIC CONGESTIVE HEART FAILURE (HCC): ICD-10-CM

## 2023-12-15 DIAGNOSIS — E87.1 HYPONATREMIA: ICD-10-CM

## 2023-12-15 DIAGNOSIS — I50.9 CHF (CONGESTIVE HEART FAILURE) (HCC): ICD-10-CM

## 2023-12-15 LAB
ALBUMIN SERPL BCP-MCNC: 3.7 G/DL (ref 3.5–5)
ALP SERPL-CCNC: 105 U/L (ref 34–104)
ALT SERPL W P-5'-P-CCNC: 13 U/L (ref 7–52)
ANION GAP SERPL CALCULATED.3IONS-SCNC: 12 MMOL/L
AST SERPL W P-5'-P-CCNC: 15 U/L (ref 13–39)
BILIRUB SERPL-MCNC: 0.8 MG/DL (ref 0.2–1)
BNP SERPL-MCNC: 152 PG/ML (ref 0–100)
BUN SERPL-MCNC: 13 MG/DL (ref 5–25)
CALCIUM SERPL-MCNC: 8.3 MG/DL (ref 8.4–10.2)
CHLORIDE SERPL-SCNC: 99 MMOL/L (ref 96–108)
CO2 SERPL-SCNC: 26 MMOL/L (ref 21–32)
CREAT SERPL-MCNC: 1.37 MG/DL (ref 0.6–1.3)
GFR SERPL CREATININE-BSD FRML MDRD: 57 ML/MIN/1.73SQ M
GLUCOSE P FAST SERPL-MCNC: 204 MG/DL (ref 65–99)
POTASSIUM SERPL-SCNC: 3.9 MMOL/L (ref 3.5–5.3)
PROT SERPL-MCNC: 6.2 G/DL (ref 6.4–8.4)
SODIUM SERPL-SCNC: 137 MMOL/L (ref 135–147)

## 2023-12-15 PROCEDURE — 99214 OFFICE O/P EST MOD 30 MIN: CPT | Performed by: NURSE PRACTITIONER

## 2023-12-15 PROCEDURE — 36415 COLL VENOUS BLD VENIPUNCTURE: CPT | Performed by: NURSE PRACTITIONER

## 2023-12-15 PROCEDURE — 80053 COMPREHEN METABOLIC PANEL: CPT | Performed by: NURSE PRACTITIONER

## 2023-12-15 PROCEDURE — 83880 ASSAY OF NATRIURETIC PEPTIDE: CPT

## 2023-12-15 NOTE — PROGRESS NOTES
Heart Failure Outpatient Progress Note - Demetrius Ohara 66 y.o. male MRN: [id number]    @ Encounter: [id number]      Assessment/Plan:    Patient Active Problem List    Diagnosis Date Noted    Nasal congestion 10/06/2023    Loose stools 04/17/2023    Chronic diastolic CHF ([address]) 95/50/4592    Diabetic polyneuropathy associated with type 2 diabetes mellitus ([address]) 12/20/2022    CKD (chronic kidney disease) stage 3 ([address]) 09/16/2022    Presence of CardioMEMS HF system 03/09/2022    Paroxysmal atrial fibrillation ([address]) 03/01/2022    Chronic heart failure with preserved ejection fraction (HFpEF) ([address]) 11/12/2021    Severe persistent asthma with exacerbation 10/11/2021    Hyponatremia 07/31/2021    HNP (herniated nucleus pulposus), lumbar 02/23/2021    Foraminal stenosis of lumbar region 02/23/2021    VICKY (obstructive sleep apnea)     Hyperlipidemia 04/18/2019    Primary osteoarthritis of both knees 06/14/2018    Irritable bowel syndrome with diarrhea 01/30/2018    Primary hypertension 01/30/2018    Type 2 diabetes mellitus with hyperglycemia, without long-term current use of insulin ([address]) 01/30/2018    Vitamin B12 deficiency 03/08/2016    Vitamin D deficiency 03/08/2016    Morbid obesity ([address]) 02/23/2016    Primary osteoarthritis of right knee 10/15/2013    Chronic renal disease, stage IV ([address]) 11/30/2023    Eosinophilia 10/18/2023    Anemia of chronic disease 10/16/2023     Chronic HFpEF, Stage C, NYHA III. PAD today 15, readings most recently 12-14, weight is up. Mild LE edema, JVP elevation. He will get follow up lab work today. If Na stable, dose Metolazone today with extra K and again Sunday or until weight is down to 240 lbs. We will touch base with him on Monday.      [place] at discharge 343 lbs, 338 lbs, 335, today, 346 lbs    Volume management :  Bumex 6 mg PO BID, Jardiance 10 mg daily, Spironolactone 25 mg daily, Metolazone 2.5 mg PRN    Cardiomems  Data: PAD Goal- 15 mmhg  12/15- 15 mmhg, this is up from 12-14 over previous days      RHC with Cardiomems calibration:  CardioMEMS rep Dacia Brennan was present throughout the case and provided the simultaneous MEMS interrogation data. -140 during case  HR 74  O2 sat 97%  Hgb 10.5     RA 8  RV 30/8  PA 28/12, 17  PCWP 10-12 (multiple checks)  TPG 5  PA sat 59% (multiple checks)  Travis CO/CI: 6.6/2.3  TD CO/CI: 7.43/2.78     Simultaneous CardioMEMS data:  Mean: 26/10/15  End expiration on MEMS waveform: 28/10      Impression/Recommendations: The patient tolerated the procedure well; no immediate complications. Normal biv filling pressures (after inpt diuresis). Normal CO/CI. CardioMEMS data correlates to RHC.  s/p CardioMems implant 3/9/22, GOAL PAD 15-18  PAD 16 10/6, 14 10/5. BNP 10/6/23: 93  122  233      Studies      LHC 9/6/22: No obstructive CAD     Pharm Nuc Stress 9/2/22: Abnormal study due to the presence of an inferior and inferolateral infarct without significant ischemia. [facility] 3/9/22:   RA 4   RV 35/4   PA 35/12/21   PCWP 10   PA sat 64%   Travis CO 5.56 L/min   Travis CI 2.2L/min/m2   PVR 1.97 SAGASTUME      TTE 4/11/2023: LVEF 50%. Normal wall motion. Grade 2 DD. RV cavity size and systolic function normal.  LA mildly dilated. Trace MR, TR. Normal IVC. TTE 03/25/2020: LVEF 40-45%. LVIDd 6.12 cm. Normal RV. TTE 07/19/2021: LVEF 50%. LVIDd 6.13 cm. Grade 1 DD. Normal RV. Trace MR and TR. Mildly dilated IVC. TTE 11/12/2021: LVEF 55%. LVIDd 6.0 cm. Grade 1 DD. Normal RV. Trace TR. Atrial fibrillation   [id number] = 3 (HF, HTN, DM). Diagnosed 02/2022. Anticoagulation on Eliquis. Rate control: BB as above. Rhythm control: No.      Hypertension, controlled overall. Mildly elevated in office today. May be from being on Prednisone. Rx: amlodipine 5mg daily      Hyperlipidemia   11/22/22:   HDL 55 LDL 85  Rx: atorvastatin 10 mg daily. Diabetes mellitus, type II     Non-insulin dependent.    HbA1c 8/9/23: 7.1% Obstructive sleep apnea  Not currently using CPAP given machine recall. Chronic respiratory failure   Asthma, moderate persistent    S/p gastric bypass (Samuel-en-Y)surgery    History of tobacco abuse   Carpal tunnel syndrome, bilateral    CKD, recent baseline 1.5-2.0. Stable. Eosinophilia/anemia. Unclear etiology. For BMB in upcoming weeks. Follows with hem/onc. HPI:  65 yo male with HFimpEF with predominant HFpEF now with multiple admits for volume overload. He is on high dose bumex 5 mg BID as outpt with prn metolazone. His morbid obesity complicates his treatment with BMI 46. Weight on admission up to 349 lbs (hospital records show weight around 314 lbs during an admit in 2022). Reported more SOB and more edema in legs - metolazone not of help as outpt. Has CardioMems, goal PAD 15. PAD 16 10/6, 14 10/5. Was diuresed. Had repeat RHC for the purpose of Cardiomems calibration. RHC hemodynamics appeared to correlate with Cardiomems device. Also seen by pulmonology for optimization of his asthma regimen. Patient was sent home on escalated oral diuretic dosing. 10/26/23. Presents today for hospital follow up. Missed originally scheduled appointment on 10/17 due to being hospitalized for asthma exacerbation. . Given IV solumedrol and transitioned to oral prednisone. Pulmonology evaluated patient, they recommended hematology consult given significant eosinophilia which they are attributing to allergic/asthma picture. No concern for underlying hematological malignancy. Feels pretty good today. Weight stable. No complaints. 11/07/2023: Patient presents for follow-up. No current complaints; feeling well overall. Continues with mild TELLES but feels he is at his baseline. Denies weight gain on home scale. Also denies SOB at rest, LE swelling, PND, and orthopnea. Last took metolazone 1-2 weeks ago. 12/15/23. Presents for follow up. Recently admitted with asthma exacerbation.  Not in decompensated heart failure and no IV diuretics given. However today, his weight is up about 10 lbs, PAD 15 mmHg per Cardiomems reading this AM. Has no complaints. Feels at his baseline. Cannot explain his weight gain. Past Medical History:   Diagnosis Date    Acute gastritis without hemorrhage     last assessed 3/24/17    Asthma     Atrial fibrillation ([address])     Bilateral leg edema 02/19/2020    CHF (congestive heart failure) [facility])     Coronary artery disease     Daytime sleepiness 07/17/2019    Diabetes mellitus ([address])     Dyspnea on exertion 10/11/2021    Edema of both feet 01/23/2020    Gastric bypass status for obesity     Hyperlipidemia     Hypertension     Hypokalemia 11/14/2021    Impaired fasting glucose     last assessed 5/10/17    Knee sprain, bilateral 06/14/2018    Leg cramps 06/16/2022    Obesity     Viral gastroenteritis     last assessed 11/4/16       12 point ROS negative other than that stated in HPI    Allergies   Allergen Reactions    Azithromycin Other (See Comments)     Shaky, uneasy feeling     Bactrim [Sulfamethoxazole-Trimethoprim] Other (See Comments)     shakiness     .     Current Outpatient Medications:     Accu-Chek FastClix Lancets MISC, Test blood sugar twice daily, Disp: 200 each, Rfl: 3    albuterol (PROVENTIL HFA,VENTOLIN HFA) 90 mcg/act inhaler, Inhale 2 puffs every 4 (four) hours as needed for wheezing or shortness of breath, Disp: 18 g, Rfl: 0    apixaban (Eliquis) 5 mg, Take 1 tablet (5 mg total) by mouth 2 (two) times a day, Disp: 60 tablet, Rfl: 5    atorvastatin (LIPITOR) 10 mg tablet, TAKE 1 TABLET BY MOUTH EVERY DAY, Disp: 30 tablet, Rfl: 8    Blood Glucose Monitoring Suppl (Accu-Chek Guide) w/Device KIT, Use 2 (two) times a day Test blood sugar twice daily, Disp: 1 kit, Rfl: 0    budesonide-formoterol (Symbicort) 160-4.5 mcg/act inhaler, Inhale 2 puffs 2 (two) times a day Rinse mouth after use., Disp: 30.6 g, Rfl: 2    bumetanide (BUMEX) 2 mg tablet, Take 3 tablets (6 mg total) by mouth 2 (two) times a day, Disp: 540 tablet, Rfl: 1    Empagliflozin (JARDIANCE) 10 MG TABS tablet, Take 1 tablet (10 mg total) by mouth daily, Disp: 30 tablet, Rfl: 11    EPINEPHrine (EPIPEN) 0.3 mg/0.3 mL SOAJ, Inject 0.3 mL (0.3 mg total) into a muscle once for 1 dose, Disp: 0.6 mL, Rfl: 0    Fasenra Pen 30 MG/ML SOAJ, , Disp: , Rfl:     fluticasone (FLONASE) 50 mcg/act nasal spray, 1 spray into each nostril 2 (two) times a day, Disp: , Rfl: 0    loratadine (CLARITIN) 10 mg tablet, Take 1 tablet (10 mg total) by mouth daily Do not start before October 13, 2023., Disp: , Rfl: 0    metolazone (ZAROXOLYN) 2.5 mg tablet, Take 20-30 minutes before Bumex dose and with additional potassium. Take for weight gain of 2-3 lbs in 24 hours, 5lbs in one week or signs of worsening fluid retention. , Disp: 90 tablet, Rfl: 0    metoprolol succinate (TOPROL-XL) 50 mg 24 hr tablet, TAKE ONE (1) TABLET BY MOUTH DAILY, Disp: 90 tablet, Rfl: 3    montelukast (SINGULAIR) 10 mg tablet, Take 1 tablet (10 mg total) by mouth daily at bedtime, Disp: 90 tablet, Rfl: 1    pantoprazole (PROTONIX) 40 mg tablet, Take 1 tablet (40 mg total) by mouth daily, Disp: 21 tablet, Rfl: 0    potassium chloride (K-DUR,KLOR-CON) 20 mEq tablet, Take 2 tablets (40 mEq total) by mouth 2 (two) times a day, Disp: 120 tablet, Rfl: 0    pregabalin (LYRICA) 50 mg capsule, Take 1 caps. at bedtime, Disp: 30 capsule, Rfl: 5    sitaGLIPtin (Januvia) 100 mg tablet, TAKE 1/2 TABLET DAILY (Patient taking differently: 50 mg daily TAKE 1/2 TABLET DAILY), Disp: 15 tablet, Rfl: 5    Spiriva Respimat 1.25 MCG/ACT AERS inhaler, INHALE 2 PUFFS BY MOUTH EVERY DAY, Disp: 4 g, Rfl: 5    spironolactone (ALDACTONE) 25 mg tablet, TAKE 1 TABLET (25 MG TOTAL) BY MOUTH DAILY. , Disp: 90 tablet, Rfl: 2  No current facility-administered medications for this visit.     Social History     Socioeconomic History    Marital status: /Civil Union     Spouse name: Not on file    Number of children: Not on file    Years of education: Not on file    Highest education level: Not on file   Occupational History    Not on file   Tobacco Use    Smoking status: Former     Types: Pipe, Cigars     Start date:      Quit date: 2021     Years since quittin.9     Passive exposure: Never    Smokeless tobacco: Never    Tobacco comments:     cigar once a day   Vaping Use    Vaping status: Never Used   Substance and Sexual Activity    Alcohol use: Yes     Alcohol/week: 2.0 standard drinks of alcohol     Types: 2 Shots of liquor per week     Comment: social    Drug use: No    Sexual activity: Yes     Partners: Female     Birth control/protection: None   Other Topics Concern    Not on file   Social History Narrative    Not on file     Social Determinants of Health     Financial Resource Strain: Not on file   Food Insecurity: No Food Insecurity (10/17/2023)    Hunger Vital Sign     Worried About Running Out of Food in the Last Year: Never true     Ran Out of Food in the Last Year: Never true   Transportation Needs: No Transportation Needs (10/17/2023)    PRAPARE - Transportation     Lack of Transportation (Medical): No     Lack of Transportation (Non-Medical):  No   Physical Activity: Not on file   Stress: Not on file   Social Connections: Not on file   Intimate Partner Violence: Not on file   Housing Stability: Low Risk  (10/17/2023)    Housing Stability Vital Sign     Unable to Pay for Housing in the Last Year: No     Number of Places Lived in the Last Year: 1     Unstable Housing in the Last Year: No       Family History   Problem Relation Age of Onset    Stroke Mother     Hypertension Father     Cancer Father     COPD Father        Physical Exam:    Vitals: /66 (BP Location: Left arm, Patient Position: Sitting, Cuff Size: Large)   Pulse 80   Ht 6' 1" (1.854 m)   Wt (!) 157 kg (346 lb)   SpO2 99%   BMI 45.65 kg/m²     Wt Readings from Last 3 Encounters:   23 (!) 157 kg (346 lb 3.2 oz) 11/30/23 (!) 150 kg (330 lb)   11/07/23 (!) 152 kg (335 lb 12.8 oz)       GEN: Thiago Cherry appears well, alert and oriented x 3, pleasant and cooperative   HEENT: pupils equal, round, and reactive to light; extraocular muscles intact  NECK: supple, no carotid bruits   HEART: regular rhythm, normal S1 and S2, no murmurs, clicks, gallops or rubs, JVP is at mid neck  LUNGS: few wheezes at bases  ABDOMEN: normal bowel sounds, soft, no tenderness, no distention  EXTREMITIES: peripheral pulses normal; no clubbing, cyanosis, trace BLLE edema around the shin  NEURO: no focal findings   SKIN: normal without suspicious lesions on exposed skin    Labs & Results:    Chemistry        Component Value Date/Time    K 5.2 11/28/2023 1358    CL 94 (L) 11/28/2023 1358    CO2 18 (L) 11/28/2023 1358    BUN 47 (H) 11/28/2023 1358    CREATININE 2.49 (H) 11/28/2023 1358        Component Value Date/Time    CALCIUM 7.6 (L) 11/28/2023 1358    ALKPHOS 140 (H) 11/28/2023 0449    AST 61 (H) 11/28/2023 0449    ALT 47 11/28/2023 0449        Lab Results   Component Value Date    WBC 6.81 12/14/2023    HGB 10.9 (L) 12/14/2023    HCT 35.1 (L) 12/14/2023    MCV 84 12/14/2023     12/14/2023     Lab Results   Component Value Date     (H) 11/27/2023      Lab Results   Component Value Date    LDLCALC 86 10/06/2023     Lab Results   Component Value Date    [id number] 1.270 09/01/2022       EKG personally reviewed by RODRIGUE Adamson. No results found for this visit on 12/15/23. Counseling / Coordination of Care  Total face to face time spent with patient 20 minutes. An additional 10 minutes was spent for chart/data review and visit preparation. Thank you for the opportunity to participate in the care of this patient.     Yolanda De Leon

## 2023-12-15 NOTE — TELEPHONE ENCOUNTER
Martinez pt and let him know labs are go . He can do the metolazone over the weekend. And I will call him on Monday.

## 2023-12-15 NOTE — PATIENT INSTRUCTIONS
Weigh yourself daily  If you gain 3 lbs in one day or 5 lbs in one week, please call the office at [phone] and ask for a nurse or the heart failure nurse  Keep your sodium intake to <2 grams, (2000 mg) per day, and fluids <2 Liters (2000 ml) per day.  This is around 6-7, 8 oz glasses of fluid per day    Get lab work today  Take 2.5-5 mg of Metolazone tonight with extra dose of Potassium

## 2023-12-16 DIAGNOSIS — E78.2 MIXED HYPERLIPIDEMIA: ICD-10-CM

## 2023-12-17 DIAGNOSIS — T50.2X5A DIURETIC-INDUCED HYPOKALEMIA: ICD-10-CM

## 2023-12-17 DIAGNOSIS — E87.6 DIURETIC-INDUCED HYPOKALEMIA: ICD-10-CM

## 2023-12-18 ENCOUNTER — TELEPHONE (OUTPATIENT)
Dept: CARDIOLOGY CLINIC | Facility: CLINIC | Age: 58
End: 2023-12-18

## 2023-12-18 LAB — MISCELLANEOUS LAB TEST RESULT: NORMAL

## 2023-12-18 RX ORDER — POTASSIUM CHLORIDE 20 MEQ/1
40 TABLET, EXTENDED RELEASE ORAL 2 TIMES DAILY
Qty: 120 TABLET | Refills: 5 | Status: SHIPPED | OUTPATIENT
Start: 2023-12-18 | End: 2024-01-17

## 2023-12-18 RX ORDER — ATORVASTATIN CALCIUM 10 MG/1
TABLET, FILM COATED ORAL
Qty: 90 TABLET | Refills: 3 | Status: SHIPPED | OUTPATIENT
Start: 2023-12-18

## 2023-12-18 NOTE — TELEPHONE ENCOUNTER
Erlin's weight is up 10 lbs. Mems reading of 15 mmhg today. Asked him to dose Metolazone this weekend until weight back to around 240. Can you check in with him and his readings on Monday ?     Thanks   Capri

## 2023-12-20 LAB
MISCELLANEOUS LAB TEST RESULT: NORMAL

## 2023-12-22 ENCOUNTER — TELEPHONE (OUTPATIENT)
Dept: CARDIOLOGY CLINIC | Facility: CLINIC | Age: 58
End: 2023-12-22

## 2023-12-22 LAB
MISCELLANEOUS LAB TEST RESULT: NORMAL

## 2023-12-22 NOTE — TELEPHONE ENCOUNTER
"Pt called stating his wt is 349 lbs was 346 in the office.  C/o LE edema and abdominal distention as well as increased SOB with minimal exertion.   He is sleeping elevated on the couch at night.  No change in output \"about the same\" he said  He took a Metolazone yesterday and will take another tomorrow?    He is taking   Bumex 6 mg BID  Potassium 40 meq BID  Spironolacotne 25 mg  "

## 2023-12-22 NOTE — TELEPHONE ENCOUNTER
Pt instructed to take extra Metolazone QOD until he reaches his baseline weight. He was instructed to take an extra Potassium with the Metolazone. He verbalized understanding

## 2023-12-22 NOTE — TELEPHONE ENCOUNTER
Agree with his plan for Metolazone daily with extra K until his weight is back to baseline. Is someone handling Cardiomems data while Brenna is out ? Can look at those trends.

## 2023-12-23 ENCOUNTER — HOSPITAL ENCOUNTER (INPATIENT)
Facility: HOSPITAL | Age: 58
LOS: 6 days | Discharge: HOME | DRG: 291 | End: 2023-12-29
Attending: EMERGENCY MEDICINE | Admitting: INTERNAL MEDICINE
Payer: COMMERCIAL

## 2023-12-23 ENCOUNTER — APPOINTMENT (EMERGENCY)
Dept: RADIOLOGY | Facility: HOSPITAL | Age: 58
DRG: 291 | End: 2023-12-23
Payer: COMMERCIAL

## 2023-12-23 ENCOUNTER — NURSE TRIAGE (OUTPATIENT)
Dept: OTHER | Facility: OTHER | Age: 58
End: 2023-12-23

## 2023-12-23 DIAGNOSIS — I50.32 CHRONIC DIASTOLIC CONGESTIVE HEART FAILURE (HCC): ICD-10-CM

## 2023-12-23 DIAGNOSIS — I50.9 CHF EXACERBATION (HCC): Primary | ICD-10-CM

## 2023-12-23 PROBLEM — E11.40 DIABETIC NEUROPATHY (HCC): Status: ACTIVE | Noted: 2023-12-23

## 2023-12-23 LAB
ALBUMIN SERPL BCP-MCNC: 3.6 G/DL (ref 3.5–5)
ALP SERPL-CCNC: 105 U/L (ref 34–104)
ALT SERPL W P-5'-P-CCNC: 9 U/L (ref 7–52)
ANION GAP SERPL CALCULATED.3IONS-SCNC: 14 MMOL/L
AST SERPL W P-5'-P-CCNC: 18 U/L (ref 13–39)
BASOPHILS # BLD AUTO: 0.05 THOUSANDS/ÂΜL (ref 0–0.1)
BASOPHILS NFR BLD AUTO: 1 % (ref 0–1)
BILIRUB SERPL-MCNC: 0.68 MG/DL (ref 0.2–1)
BNP SERPL-MCNC: 245 PG/ML (ref 0–100)
BUN SERPL-MCNC: 11 MG/DL (ref 5–25)
CALCIUM SERPL-MCNC: 8 MG/DL (ref 8.4–10.2)
CHLORIDE SERPL-SCNC: 101 MMOL/L (ref 96–108)
CO2 SERPL-SCNC: 22 MMOL/L (ref 21–32)
CREAT SERPL-MCNC: 1.13 MG/DL (ref 0.6–1.3)
EOSINOPHIL # BLD AUTO: 0.4 THOUSAND/ÂΜL (ref 0–0.61)
EOSINOPHIL NFR BLD AUTO: 6 % (ref 0–6)
ERYTHROCYTE [DISTWIDTH] IN BLOOD BY AUTOMATED COUNT: 17.2 % (ref 11.6–15.1)
GFR SERPL CREATININE-BSD FRML MDRD: 72 ML/MIN/1.73SQ M
GLUCOSE SERPL-MCNC: 174 MG/DL (ref 65–140)
GLUCOSE SERPL-MCNC: 196 MG/DL (ref 65–140)
HCT VFR BLD AUTO: 34.1 % (ref 36.5–49.3)
HGB BLD-MCNC: 10.5 G/DL (ref 12–17)
IMM GRANULOCYTES # BLD AUTO: 0.08 THOUSAND/UL (ref 0–0.2)
IMM GRANULOCYTES NFR BLD AUTO: 1 % (ref 0–2)
LYMPHOCYTES # BLD AUTO: 1.1 THOUSANDS/ÂΜL (ref 0.6–4.47)
LYMPHOCYTES NFR BLD AUTO: 17 % (ref 14–44)
MCH RBC QN AUTO: 26.7 PG (ref 26.8–34.3)
MCHC RBC AUTO-ENTMCNC: 30.8 G/DL (ref 31.4–37.4)
MCV RBC AUTO: 87 FL (ref 82–98)
MONOCYTES # BLD AUTO: 0.62 THOUSAND/ÂΜL (ref 0.17–1.22)
MONOCYTES NFR BLD AUTO: 9 % (ref 4–12)
NEUTROPHILS # BLD AUTO: 4.34 THOUSANDS/ÂΜL (ref 1.85–7.62)
NEUTS SEG NFR BLD AUTO: 66 % (ref 43–75)
NRBC BLD AUTO-RTO: 0 /100 WBCS
PLATELET # BLD AUTO: 243 THOUSANDS/UL (ref 149–390)
PMV BLD AUTO: 10 FL (ref 8.9–12.7)
POTASSIUM SERPL-SCNC: 3.7 MMOL/L (ref 3.5–5.3)
PROT SERPL-MCNC: 6.5 G/DL (ref 6.4–8.4)
RBC # BLD AUTO: 3.93 MILLION/UL (ref 3.88–5.62)
SODIUM SERPL-SCNC: 137 MMOL/L (ref 135–147)
WBC # BLD AUTO: 6.59 THOUSAND/UL (ref 4.31–10.16)

## 2023-12-23 PROCEDURE — 71046 X-RAY EXAM CHEST 2 VIEWS: CPT

## 2023-12-23 PROCEDURE — 99285 EMERGENCY DEPT VISIT HI MDM: CPT

## 2023-12-23 PROCEDURE — 36415 COLL VENOUS BLD VENIPUNCTURE: CPT

## 2023-12-23 PROCEDURE — 80053 COMPREHEN METABOLIC PANEL: CPT

## 2023-12-23 PROCEDURE — 82948 REAGENT STRIP/BLOOD GLUCOSE: CPT

## 2023-12-23 PROCEDURE — 99222 1ST HOSP IP/OBS MODERATE 55: CPT | Performed by: STUDENT IN AN ORGANIZED HEALTH CARE EDUCATION/TRAINING PROGRAM

## 2023-12-23 PROCEDURE — 83880 ASSAY OF NATRIURETIC PEPTIDE: CPT

## 2023-12-23 PROCEDURE — 96374 THER/PROPH/DIAG INJ IV PUSH: CPT

## 2023-12-23 PROCEDURE — 99285 EMERGENCY DEPT VISIT HI MDM: CPT | Performed by: EMERGENCY MEDICINE

## 2023-12-23 PROCEDURE — 85025 COMPLETE CBC W/AUTO DIFF WBC: CPT

## 2023-12-23 RX ORDER — BUMETANIDE 0.25 MG/ML
7.5 INJECTION INTRAMUSCULAR; INTRAVENOUS ONCE
Status: DISCONTINUED | OUTPATIENT
Start: 2023-12-23 | End: 2023-12-23

## 2023-12-23 RX ORDER — BUMETANIDE 0.25 MG/ML
0.5 INJECTION INTRAMUSCULAR; INTRAVENOUS CONTINUOUS
Status: DISCONTINUED | OUTPATIENT
Start: 2023-12-23 | End: 2023-12-23

## 2023-12-23 RX ORDER — SPIRONOLACTONE 25 MG/1
25 TABLET ORAL DAILY
Status: DISCONTINUED | OUTPATIENT
Start: 2023-12-24 | End: 2023-12-29 | Stop reason: HOSPADM

## 2023-12-23 RX ORDER — INSULIN LISPRO 100 [IU]/ML
1-6 INJECTION, SOLUTION INTRAVENOUS; SUBCUTANEOUS
Status: DISCONTINUED | OUTPATIENT
Start: 2023-12-23 | End: 2023-12-29 | Stop reason: HOSPADM

## 2023-12-23 RX ORDER — ALBUTEROL SULFATE 90 UG/1
2 AEROSOL, METERED RESPIRATORY (INHALATION) EVERY 4 HOURS PRN
Status: DISCONTINUED | OUTPATIENT
Start: 2023-12-23 | End: 2023-12-29 | Stop reason: HOSPADM

## 2023-12-23 RX ORDER — METOPROLOL SUCCINATE 50 MG/1
50 TABLET, EXTENDED RELEASE ORAL DAILY
Status: DISCONTINUED | OUTPATIENT
Start: 2023-12-24 | End: 2023-12-29 | Stop reason: HOSPADM

## 2023-12-23 RX ORDER — PREGABALIN 50 MG/1
50 CAPSULE ORAL
Status: DISCONTINUED | OUTPATIENT
Start: 2023-12-23 | End: 2023-12-29 | Stop reason: HOSPADM

## 2023-12-23 RX ORDER — BUDESONIDE AND FORMOTEROL FUMARATE DIHYDRATE 160; 4.5 UG/1; UG/1
2 AEROSOL RESPIRATORY (INHALATION) 2 TIMES DAILY
Status: DISCONTINUED | OUTPATIENT
Start: 2023-12-23 | End: 2023-12-29 | Stop reason: HOSPADM

## 2023-12-23 RX ORDER — POTASSIUM CHLORIDE 20 MEQ/1
40 TABLET, EXTENDED RELEASE ORAL 2 TIMES DAILY
Status: DISCONTINUED | OUTPATIENT
Start: 2023-12-23 | End: 2023-12-29 | Stop reason: HOSPADM

## 2023-12-23 RX ORDER — PANTOPRAZOLE SODIUM 40 MG/1
40 TABLET, DELAYED RELEASE ORAL DAILY
Status: DISCONTINUED | OUTPATIENT
Start: 2023-12-24 | End: 2023-12-29 | Stop reason: HOSPADM

## 2023-12-23 RX ORDER — ATORVASTATIN CALCIUM 10 MG/1
10 TABLET, FILM COATED ORAL DAILY
Status: DISCONTINUED | OUTPATIENT
Start: 2023-12-24 | End: 2023-12-29 | Stop reason: HOSPADM

## 2023-12-23 RX ORDER — BUMETANIDE 0.25 MG/ML
0.5 INJECTION INTRAMUSCULAR; INTRAVENOUS CONTINUOUS
Status: DISCONTINUED | OUTPATIENT
Start: 2023-12-23 | End: 2023-12-27

## 2023-12-23 RX ADMIN — Medication 0.5 MG/HR: at 16:52

## 2023-12-23 RX ADMIN — PREGABALIN 50 MG: 50 CAPSULE ORAL at 22:32

## 2023-12-23 RX ADMIN — APIXABAN 5 MG: 5 TABLET, FILM COATED ORAL at 19:33

## 2023-12-23 RX ADMIN — POTASSIUM CHLORIDE 40 MEQ: 1500 TABLET, EXTENDED RELEASE ORAL at 20:53

## 2023-12-23 NOTE — TELEPHONE ENCOUNTER
"Reason for Disposition   [1] Difficulty breathing with exertion (e.g., walking) AND [2] new-onset or worsening    Answer Assessment - Initial Assessment Questions  1. ONSET: \"When did the swelling start?\" (e.g., minutes, hours, days)      Since yesterday    2. LOCATION: \"What part of the leg is swollen?\"  \"Are both legs swollen or just one leg?\"      Legs and abdomen    3. SEVERITY: \"How bad is the swelling?\" (e.g., localized; mild, moderate, severe)   - Localized - small area of swelling localized to one leg   - MILD pedal edema - swelling limited to foot and ankle, pitting edema < 1/4 inch (6 mm) deep, rest and elevation eliminate most or all swelling   - MODERATE edema - swelling of lower leg to knee, pitting edema > 1/4 inch (6 mm) deep, rest and elevation only partially reduce swelling   - SEVERE edema - swelling extends above knee, facial or hand swelling present       Severe    4. REDNESS: \"Does the swelling look red or infected?\"      No    5. PAIN: \"Is the swelling painful to touch?\" If Yes, ask: \"How painful is it?\"   (Scale 1-10; mild, moderate or severe)      No    6. FEVER: \"Do you have a fever?\" If Yes, ask: \"What is it, how was it measured, and when did it start?\"       No    7. CAUSE: \"What do you think is causing the leg swelling?\"      Fluid retention    8. MEDICAL HISTORY: \"Do you have a history of heart failure, kidney disease, liver failure, or cancer?\"      CHF, Afib    9. RECURRENT SYMPTOM: \"Have you had leg swelling before?\" If Yes, ask: \"When was the last time?\" \"What happened that time?\"      Yes    10. OTHER SYMPTOMS: \"Do you have any other symptoms?\" (e.g., chest pain, difficulty breathing)        Pt is reporting SOB w/ exertion. Denies chest pain. BP: 130/74 hr 86. Took morning dose of Bumex, Spirinolactone, and also took one dose of metalazone.    Protocols used: Leg Swelling and Edema-ADULT-AH    Per on call - go to ED.     "

## 2023-12-23 NOTE — H&P
"[facility]  H&P  Name: Mesfin Cano 58 y.o. male I MRN: [id number]  Unit/Bed#: ED 18 I Date of Admission: 12/23/2023   Date of Service: 12/23/2023 I Hospital Day: 0      Assessment/Plan   * Acute on chronic heart failure with preserved ejection fraction (HFpEF) (Formerly [facility])  Assessment & Plan  Wt Readings from Last 3 Encounters:   12/15/23 (!) 157 kg (346 lb)   12/13/23 (!) 157 kg (346 lb 3.2 oz)   11/30/23 (!) 150 kg (330 lb)     Acute on chronic heart failure with preserved EF. Stage C, NYHA III.  Etiology A-fib, hypertension, obesity  Dry weight 335 pounds, weight today 349 lb.  proBNP 245, previously in the 90-160s.  Echo 4/2023 EF of 50%, grade 2 diastolic dysfunction.  Diuretic regimen Bumex 6 mg twice daily, spironolactone 25 mg daily, metolazone 2.5 mg as needed and Jardiance 10 mg daily.  Acute on chronic exacerbation, not responding to outpatient treatment.  He was started on Bumex drip by the ED, will continue.  Continue spironolactone, Jardiance.  He took metolazone with Bumex this morning.  May require redosing tomorrow.  Continue potassium supplementation with diuretics.  Monitor BMP  Strict ins and outs, daily standing weights, low-sodium diet and 1800 mL fluid restriction.  Consult heart failure.    Diabetic neuropathy (HCC)  Assessment & Plan  PDMP reviewed, continue Lyrica.        Paroxysmal atrial fibrillation (HCC)  Assessment & Plan  Continue metoprolol succinate 50 mg daily.  Continue Eliquis for anticoagulation.    Severe persistent asthma  Assessment & Plan  Not currently in exacerbation.  Continue albuterol, Symbicort and Spiriva equivalent.    Hyperlipidemia  Assessment & Plan  Continue home atorvastatin.    Type 2 diabetes mellitus with hyperglycemia, without long-term current use of insulin (Formerly [facility])  Assessment & Plan  Lab Results   Component Value Date    HGBA1C 7.1 (A) 08/09/2023       No results for input(s): \"POCGLU\" in the last 72 hours.    Blood Sugar " Average: Last 72 hrs:    At home, on empagliflozin and Sitagliptin.  Continue empagliflozin as above.  Hold Sitagliptin.  Correctional insulin with meals.      VTE Pharmacologic Prophylaxis: VTE Score: 4 Moderate Risk (Score 3-4) - Pharmacological DVT Prophylaxis Ordered: apixaban (Eliquis).  Code Status: Level 1 - Full Code   Discussion with family: Patient declined call to .  He already updated his family.    Anticipated Length of Stay: Patient will be admitted on an inpatient basis with an anticipated length of stay of greater than 2 midnights secondary to acute on chronic heart failure exacerbation on IV diuretics.    Total Time Spent on Date of Encounter in care of patient: 52 mins. This time was spent on one or more of the following: performing physical exam; counseling and coordination of care; obtaining or reviewing history; documenting in the medical record; reviewing/ordering tests, medications or procedures; communicating with other healthcare professionals and discussing with patient's family/caregivers.    Chief Complaint: Shortness of breath    History of Present Illness:  Mesfin Cano is a 58 y.o. male with a PMH of heart failure with preserved EF, A-fib, severe asthma, diabetes mellitus and diabetic neuropathy who presents with worsening shortness of breath.  Patient saw his primary care doctor on 12/13 and noticed 10 pounds weight gain.  He followed up with cardiology in 2 days, his weight was still up and he had worsening lower extremity edema.  He was instructed to dose metolazone until his weight is down to 240.  Unfortunately, this was not successful.  Had worsening shortness of breath with exertion, orthopnea, PND, worsening leg swelling and abdominal distention and hence presented to the ED.  He denies chest pain, cough or hemoptysis.  Patient is compliant with all his medications, 2000 mL fluids per day and low-salt diet.  He also mentioned to me that in the past week he had 2  episodes where he had right arm went numb from the shoulder to the tips of the finger, it would last for a day and resolved.  No neck pain.  Weakness or numbness anywhere else.  Patient is ex cigar smoker, drinks alcohol socially, no drugs.  He is a retired .      Review of Systems:  Review of Systems   Respiratory:  Positive for shortness of breath. Negative for cough.    Cardiovascular:  Positive for leg swelling. Negative for chest pain.   Gastrointestinal:  Positive for abdominal distention.   Neurological:  Positive for numbness.       Past Medical and Surgical History:   Past Medical History:   Diagnosis Date    Acute gastritis without hemorrhage     last assessed 3/24/17    Asthma     Atrial fibrillation (HCC)     Bilateral leg edema 02/19/2020    CHF (congestive heart failure) ([facility])     Coronary artery disease     Daytime sleepiness 07/17/2019    Diabetes mellitus ([facility])     Dyspnea on exertion 10/11/2021    Edema of both feet 01/23/2020    Gastric bypass status for obesity     Hyperlipidemia     Hypertension     Hypokalemia 11/14/2021    Impaired fasting glucose     last assessed 5/10/17    Knee sprain, bilateral 06/14/2018    Leg cramps 06/16/2022    Obesity     Viral gastroenteritis     last assessed 11/4/16       Past Surgical History:   Procedure Laterality Date    CARDIAC CATHETERIZATION N/A 3/9/2022    Procedure: CARDIAC RHC W/ PRESSURE SENSOR;  Surgeon: Aminata Wilcox MD;  Location: [facility];  Service: Cardiology    CARDIAC CATHETERIZATION N/A 9/6/2022    Procedure: Cardiac catheterization;  Surgeon: Yolanda Del Rosario DO;  Location: [facility];  Service: Cardiology    CARDIAC CATHETERIZATION N/A 9/6/2022    Procedure: Cardiac Coronary Angiogram;  Surgeon: Yolanda Del Rosario DO;  Location: [facility];  Service: Cardiology    CARDIAC CATHETERIZATION N/A 10/11/2023    Procedure: Cardiac RHC;  Surgeon: Yoel Darden MD;  Location: [facility];  Service:  Cardiology    CARPAL TUNNEL RELEASE      bilateral    GASTRIC BYPASS  2004    with han en y    HERNIA REPAIR      ventral inscisional    TONSILLECTOMY         Meds/Allergies:  Prior to Admission medications    Medication Sig Start Date End Date Taking? Authorizing Provider   albuterol (PROVENTIL HFA,VENTOLIN HFA) 90 mcg/act inhaler Inhale 2 puffs every 4 (four) hours as needed for wheezing or shortness of breath 11/28/23  Yes Mary Naik PA-C   apixaban (Eliquis) 5 mg Take 1 tablet (5 mg total) by mouth 2 (two) times a day 10/24/23  Yes Aminata Wilcox MD   atorvastatin (LIPITOR) 10 mg tablet TAKE 1 TABLET BY MOUTH EVERY DAY 12/18/23  Yes Aminata Wilcox MD   budesonide-formoterol (Symbicort) 160-4.5 mcg/act inhaler Inhale 2 puffs 2 (two) times a day Rinse mouth after use. 11/29/22  Yes Soo Chavez MD   bumetanide (BUMEX) 2 mg tablet Take 3 tablets (6 mg total) by mouth 2 (two) times a day 11/22/23 5/20/24 Yes Aster García PA-C   Empagliflozin (JARDIANCE) 10 MG TABS tablet Take 1 tablet (10 mg total) by mouth daily 5/26/23  Yes Aminata Wilcox MD   fluticasone (FLONASE) 50 mcg/act nasal spray 1 spray into each nostril 2 (two) times a day 10/12/23  Yes RODRIGUE Pickens   loratadine (CLARITIN) 10 mg tablet Take 1 tablet (10 mg total) by mouth daily Do not start before October 13, 2023. 10/13/23  Yes RODRIGUE Pickens   metolazone (ZAROXOLYN) 2.5 mg tablet Take 20-30 minutes before Bumex dose and with additional potassium.Take for weight gain of 2-3 lbs in 24 hours, 5lbs in one week or signs of worsening fluid retention. 10/12/23  Yes RODRIGUE Rodriges   metoprolol succinate (TOPROL-XL) 50 mg 24 hr tablet TAKE ONE (1) TABLET BY MOUTH DAILY 10/26/23  Yes Aminata Wilcox MD   pantoprazole (PROTONIX) 40 mg tablet Take 1 tablet (40 mg total) by mouth daily 3/21/23  Yes RODRIGUE Ivy   potassium chloride (K-DUR,KLOR-CON) 20 mEq tablet Take 2 tablets (40 mEq total) by mouth 2  (two) times a day 12/18/23 1/17/24 Yes Soo Chavez MD   pregabalin (LYRICA) 50 mg capsule Take 1 caps. at bedtime 7/6/23  Yes Soo Chavez MD   sitaGLIPtin (Januvia) 100 mg tablet TAKE 1/2 TABLET DAILY  Patient taking differently: 50 mg daily TAKE 1/2 TABLET DAILY 8/21/23  Yes Soo Chavez MD   Spiriva Respimat 1.25 MCG/ACT AERS inhaler INHALE 2 PUFFS BY MOUTH EVERY DAY 2/5/23  Yes Soo Chavez MD   spironolactone (ALDACTONE) 25 mg tablet TAKE 1 TABLET (25 MG TOTAL) BY MOUTH DAILY. 11/6/23  Yes Aminata Wilcox MD   Accu-Chek FastClix Lancets MISC Test blood sugar twice daily 11/16/21   Soo Chavez MD   Blood Glucose Monitoring Suppl (Accu-Chek Guide) w/Device KIT Use 2 (two) times a day Test blood sugar twice daily 8/5/21   Soo Chavez MD   EPINEPHrine (EPIPEN) 0.3 mg/0.3 mL SOAJ Inject 0.3 mL (0.3 mg total) into a muscle once for 1 dose 2/21/23 11/30/23  Tokunbo Daquan Jaquez MD   Fasenra Pen 30 MG/ML SOAJ  3/24/23   Historical Provider, MD   montelukast (SINGULAIR) 10 mg tablet Take 1 tablet (10 mg total) by mouth daily at bedtime 2/3/22 12/15/23  Soo Chavez MD     I have reviewed home medications with patient personally.    Allergies:   Allergies   Allergen Reactions    Azithromycin Other (See Comments)     Shaky, uneasy feeling     Bactrim [Sulfamethoxazole-Trimethoprim] Other (See Comments)     shakiness       Social History:  Marital Status: /Civil Union   Occupation: Retired  Patient Pre-hospital Living Situation: Home  Patient Pre-hospital Level of Mobility: walks  Patient Pre-hospital Diet Restrictions: 2000 mL fluid restriction, low-salt diet  Substance Use History:   Social History     Substance and Sexual Activity   Alcohol Use Yes    Alcohol/week: 2.0 standard drinks of alcohol    Types: 2 Shots of liquor per week    Comment: social     Social History     Tobacco Use   Smoking Status Former    Types: Pipe, Cigars    Start date: 2016    Quit  date: 2021    Years since quittin.9    Passive exposure: Never   Smokeless Tobacco Never   Tobacco Comments    cigar once a day     Social History     Substance and Sexual Activity   Drug Use No       Family History:  Family History   Problem Relation Age of Onset    Stroke Mother     Hypertension Father     Cancer Father     COPD Father        Physical Exam:     Vitals:   Blood Pressure: 150/77 (23 1700)  Pulse: 86 (23 1700)  Temperature: 97.8 °F (36.6 °C) (23 1449)  Temp Source: Oral (23 1449)  Respirations: 22 (23 1700)  SpO2: 100 % (23 1700)    Physical Exam  Vitals and nursing note reviewed.   Constitutional:       General: He is not in acute distress.     Appearance: Normal appearance. He is not ill-appearing or toxic-appearing.   HENT:      Head: Normocephalic and atraumatic.   Cardiovascular:      Rate and Rhythm: Normal rate.      Heart sounds: Normal heart sounds. No murmur heard.  Pulmonary:      Breath sounds: Normal breath sounds.   Abdominal:      General: There is distension.      Palpations: Abdomen is soft.      Tenderness: There is no abdominal tenderness.   Musculoskeletal:      Right lower leg: Edema present.      Left lower leg: Edema present.   Neurological:      Mental Status: He is alert.          Additional Data:     Lab Results:  Results from last 7 days   Lab Units 23  1515   WBC Thousand/uL 6.59   HEMOGLOBIN g/dL 10.5*   HEMATOCRIT % 34.1*   PLATELETS Thousands/uL 243   NEUTROS PCT % 66   LYMPHS PCT % 17   MONOS PCT % 9   EOS PCT % 6     Results from last 7 days   Lab Units 23  1515   SODIUM mmol/L 137   POTASSIUM mmol/L 3.7   CHLORIDE mmol/L 101   CO2 mmol/L 22   BUN mg/dL 11   CREATININE mg/dL 1.13   ANION GAP mmol/L 14   CALCIUM mg/dL 8.0*   ALBUMIN g/dL 3.6   TOTAL BILIRUBIN mg/dL 0.68   ALK PHOS U/L 105*   ALT U/L 9   AST U/L 18   GLUCOSE RANDOM mg/dL 174*                       Lines/Drains:  Invasive Devices       Peripheral  Intravenous Line  Duration             Peripheral IV 12/14/23 Left;Proximal;Ventral (anterior) Forearm 9 days    Peripheral IV 12/23/23 Distal;Left;Upper;Ventral (anterior) Arm <1 day                        Imaging: Reviewed radiology reports from this admission including: chest xray  XR chest 2 views    (Results Pending)       ** Please Note: This note has been constructed using a voice recognition system. **

## 2023-12-23 NOTE — TELEPHONE ENCOUNTER
"Regarding: Weight Gain // Dr paige did not work  ----- Message from Radha Perla sent at 12/23/2023  1:20 PM EST -----   I have been in contact with the office and they told me to take an extra dose of Metolazone today but it is not working. My weight is still up and I am still sob when I exert myself\"    "

## 2023-12-23 NOTE — ASSESSMENT & PLAN NOTE
Wt Readings from Last 3 Encounters:   12/15/23 (!) 157 kg (346 lb)   12/13/23 (!) 157 kg (346 lb 3.2 oz)   11/30/23 (!) 150 kg (330 lb)     Acute on chronic heart failure with preserved EF. Stage C, NYHA III.  Etiology A-fib, hypertension, obesity  Dry weight 335 pounds, weight today 349 lb.  proBNP 245, previously in the 90-160s.  Echo 4/2023 EF of 50%, grade 2 diastolic dysfunction.  Diuretic regimen Bumex 6 mg twice daily, spironolactone 25 mg daily, metolazone 2.5 mg as needed and Jardiance 10 mg daily.  Acute on chronic exacerbation, not responding to outpatient treatment.  He was started on Bumex drip by the ED, will continue.  Continue spironolactone, Jardiance.  He took metolazone with Bumex this morning.  May require redosing tomorrow.  Continue potassium supplementation with diuretics.  Monitor BMP  Strict ins and outs, daily standing weights, low-sodium diet and 1800 mL fluid restriction.  Consult heart failure.

## 2023-12-23 NOTE — ASSESSMENT & PLAN NOTE
"Lab Results   Component Value Date    HGBA1C 7.1 (A) 08/09/2023       No results for input(s): \"POCGLU\" in the last 72 hours.    Blood Sugar Average: Last 72 hrs:    At home, on empagliflozin and Sitagliptin.  Continue empagliflozin as above.  Hold Sitagliptin.  Correctional insulin with meals.  "

## 2023-12-23 NOTE — ED PROVIDER NOTES
History  Chief Complaint   Patient presents with    Shortness of Breath     Pt c/o SOB since yesterday - spoke to his Cardiology office and was told to come here.     58-year-old male with history of CHF, atrial fibrillation on Eliquis, and asthma presents with worsening dyspnea on exertion for the past 2 days.  Patient also reports a 10 pound weight gain over the past week and worsening bilateral lower extremity edema.  Patient has been taking his Bumex and metolazone as directed.  Denies chest pain, dyspnea at rest, cough, fever, history of ACS, or history of PE or DVT.        Prior to Admission Medications   Prescriptions Last Dose Informant Patient Reported? Taking?   Accu-Chek FastClix Lancets MISC  Self No No   Sig: Test blood sugar twice daily   Blood Glucose Monitoring Suppl (Accu-Chek Guide) w/Device KIT  Self No No   Sig: Use 2 (two) times a day Test blood sugar twice daily   EPINEPHrine (EPIPEN) 0.3 mg/0.3 mL SOAJ  Self No No   Sig: Inject 0.3 mL (0.3 mg total) into a muscle once for 1 dose   Empagliflozin (JARDIANCE) 10 MG TABS tablet  Self No No   Sig: Take 1 tablet (10 mg total) by mouth daily   Fasenra Pen 30 MG/ML SOAJ  Self Yes No   Spiriva Respimat 1.25 MCG/ACT AERS inhaler  Self No No   Sig: INHALE 2 PUFFS BY MOUTH EVERY DAY   albuterol (PROVENTIL HFA,VENTOLIN HFA) 90 mcg/act inhaler   No No   Sig: Inhale 2 puffs every 4 (four) hours as needed for wheezing or shortness of breath   apixaban (Eliquis) 5 mg  Self No No   Sig: Take 1 tablet (5 mg total) by mouth 2 (two) times a day   atorvastatin (LIPITOR) 10 mg tablet   No No   Sig: TAKE 1 TABLET BY MOUTH EVERY DAY   budesonide-formoterol (Symbicort) 160-4.5 mcg/act inhaler  Self No No   Sig: Inhale 2 puffs 2 (two) times a day Rinse mouth after use.   bumetanide (BUMEX) 2 mg tablet   No No   Sig: Take 3 tablets (6 mg total) by mouth 2 (two) times a day   fluticasone (FLONASE) 50 mcg/act nasal spray  Self No No   Si spray into each nostril 2 (two)  times a day   loratadine (CLARITIN) 10 mg tablet  Self No No   Sig: Take 1 tablet (10 mg total) by mouth daily Do not start before October 13, 2023.   metolazone (ZAROXOLYN) 2.5 mg tablet  Self No No   Sig: Take 20-30 minutes before Bumex dose and with additional potassium.Take for weight gain of 2-3 lbs in 24 hours, 5lbs in one week or signs of worsening fluid retention.   metoprolol succinate (TOPROL-XL) 50 mg 24 hr tablet  Self No No   Sig: TAKE ONE (1) TABLET BY MOUTH DAILY   montelukast (SINGULAIR) 10 mg tablet  Self No No   Sig: Take 1 tablet (10 mg total) by mouth daily at bedtime   pantoprazole (PROTONIX) 40 mg tablet  Self No No   Sig: Take 1 tablet (40 mg total) by mouth daily   potassium chloride (K-DUR,KLOR-CON) 20 mEq tablet   No No   Sig: Take 2 tablets (40 mEq total) by mouth 2 (two) times a day   pregabalin (LYRICA) 50 mg capsule  Self No No   Sig: Take 1 caps. at bedtime   sitaGLIPtin (Januvia) 100 mg tablet  Self No No   Sig: TAKE 1/2 TABLET DAILY   Patient taking differently: 50 mg daily TAKE 1/2 TABLET DAILY   spironolactone (ALDACTONE) 25 mg tablet  Self No No   Sig: TAKE 1 TABLET (25 MG TOTAL) BY MOUTH DAILY.      Facility-Administered Medications: None       Past Medical History:   Diagnosis Date    Acute gastritis without hemorrhage     last assessed 3/24/17    Asthma     Atrial fibrillation (HCC)     Bilateral leg edema 02/19/2020    CHF (congestive heart failure) (HCC)     Coronary artery disease     Daytime sleepiness 07/17/2019    Diabetes mellitus (HCC)     Dyspnea on exertion 10/11/2021    Edema of both feet 01/23/2020    Gastric bypass status for obesity     Hyperlipidemia     Hypertension     Hypokalemia 11/14/2021    Impaired fasting glucose     last assessed 5/10/17    Knee sprain, bilateral 06/14/2018    Leg cramps 06/16/2022    Obesity     Viral gastroenteritis     last assessed 11/4/16       Past Surgical History:   Procedure Laterality Date    CARDIAC CATHETERIZATION N/A  3/9/2022    Procedure: CARDIAC RHC W/ PRESSURE SENSOR;  Surgeon: Aminata Wilcox MD;  Location: [facility];  Service: Cardiology    CARDIAC CATHETERIZATION N/A 2022    Procedure: Cardiac catheterization;  Surgeon: Yolanda Del Rosario DO;  [facility];  Service: Cardiology    CARDIAC CATHETERIZATION N/A 2022    Procedure: Cardiac Coronary Angiogram;  Surgeon: Yolanda Del Rosario DO;  [facility];  Service: Cardiology    CARDIAC CATHETERIZATION N/A 10/11/2023    Procedure: Cardiac RHC;  Surgeon: Yoel Darden MD;  [facility]: [facility];  Service: Cardiology    CARPAL TUNNEL RELEASE      bilateral    GASTRIC BYPASS      with han en y    HERNIA REPAIR      ventral inscisional    TONSILLECTOMY         Family History   Problem Relation Age of Onset    Stroke Mother     Hypertension Father     Cancer Father     COPD Father      I have reviewed and agree with the history as documented.    E-Cigarette/Vaping    E-Cigarette Use Never User      E-Cigarette/Vaping Substances    Nicotine No     THC No     CBD No     Flavoring No     Other No     Unknown No      Social History     Tobacco Use    Smoking status: Former     Types: Pipe, Cigars     Start date:      Quit date: 2021     Years since quittin.9     Passive exposure: Never    Smokeless tobacco: Never    Tobacco comments:     cigar once a day   Vaping Use    Vaping status: Never Used   Substance Use Topics    Alcohol use: Yes     Alcohol/week: 2.0 standard drinks of alcohol     Types: 2 Shots of liquor per week     Comment: social    Drug use: No        Review of Systems   Constitutional:  Negative for chills and fever.   HENT:  Negative for ear pain and sore throat.    Eyes:  Negative for pain and visual disturbance.   Respiratory:  Positive for shortness of breath. Negative for cough.    Cardiovascular:  Positive for leg swelling. Negative for chest pain and palpitations.   Gastrointestinal:   Negative for abdominal pain and vomiting.   Genitourinary:  Negative for dysuria and hematuria.   Musculoskeletal:  Negative for arthralgias and back pain.   Skin:  Negative for color change and rash.   Neurological:  Negative for seizures and syncope.   All other systems reviewed and are negative.      Physical Exam  ED Triage Vitals [12/23/23 1449]   Temperature Pulse Respirations Blood Pressure SpO2   97.8 °F (36.6 °C) 83 18 149/70 99 %      Temp Source Heart Rate Source Patient Position - Orthostatic VS BP Location FiO2 (%)   Oral Monitor Sitting Right arm --      Pain Score       No Pain             Orthostatic Vital Signs  Vitals:    12/23/23 1449 12/23/23 1600 12/23/23 1700   BP: 149/70 141/72 150/77   Pulse: 83 88 86   Patient Position - Orthostatic VS: Sitting Lying Lying       Physical Exam  Vitals and nursing note reviewed.   Constitutional:       General: He is not in acute distress.     Appearance: He is well-developed. He is not ill-appearing, toxic-appearing or diaphoretic.   HENT:      Head: Normocephalic and atraumatic.   Eyes:      Conjunctiva/sclera: Conjunctivae normal.   Neck:      Vascular: No JVD.   Cardiovascular:      Rate and Rhythm: Normal rate and regular rhythm.      Heart sounds: No murmur heard.  Pulmonary:      Effort: Pulmonary effort is normal. No tachypnea or respiratory distress.      Breath sounds: Examination of the right-lower field reveals rales. Examination of the left-lower field reveals rales. Rales present. No decreased breath sounds or wheezing.   Abdominal:      Palpations: Abdomen is soft.      Tenderness: There is no abdominal tenderness.   Musculoskeletal:         General: No swelling.      Cervical back: Neck supple.      Right lower leg: No tenderness. Edema present.      Left lower leg: No tenderness. Edema present.   Skin:     General: Skin is warm and dry.      Capillary Refill: Capillary refill takes less than 2 seconds.   Neurological:      Mental Status: He is  alert and oriented to person, place, and time.   Psychiatric:         Mood and Affect: Mood normal.         Behavior: Behavior normal.         ED Medications  Medications   bumetanide (BUMEX) 12.5 mg infusion 50 mL (0.5 mg/hr Intravenous New Bag 12/23/23 1652)       Diagnostic Studies  Results Reviewed       Procedure Component Value Units Date/Time    Comprehensive metabolic panel [[id number]]  (Abnormal) Collected: 12/23/23 1515    Lab Status: Final result Specimen: Blood from Arm, Left Updated: 12/23/23 1552     Sodium 137 mmol/L      Potassium 3.7 mmol/L      Chloride 101 mmol/L      CO2 22 mmol/L      ANION GAP 14 mmol/L      BUN 11 mg/dL      Creatinine 1.13 mg/dL      Glucose 174 mg/dL      Calcium 8.0 mg/dL      AST 18 U/L      ALT 9 U/L      Alkaline Phosphatase 105 U/L      Total Protein 6.5 g/dL      Albumin 3.6 g/dL      Total Bilirubin 0.68 mg/dL      eGFR 72 ml/min/1.73sq m     Narrative:      National Kidney Disease Foundation guidelines for Chronic Kidney Disease (CKD):     Stage 1 with normal or high GFR (GFR > 90 mL/min/1.73 square meters)    Stage 2 Mild CKD (GFR = 60-89 mL/min/1.73 square meters)    Stage 3A Moderate CKD (GFR = 45-59 mL/min/1.73 square meters)    Stage 3B Moderate CKD (GFR = 30-44 mL/min/1.73 square meters)    Stage 4 Severe CKD (GFR = 15-29 mL/min/1.73 square meters)    Stage 5 End Stage CKD (GFR <15 mL/min/1.73 square meters)  Note: GFR calculation is accurate only with a steady state creatinine    CBC and differential [[id number]]  (Abnormal) Collected: 12/23/23 1515    Lab Status: Final result Specimen: Blood from Arm, Left Updated: 12/23/23 1534     WBC 6.59 Thousand/uL      RBC 3.93 Million/uL      Hemoglobin 10.5 g/dL      Hematocrit 34.1 %      MCV 87 fL      MCH 26.7 pg      MCHC 30.8 g/dL      RDW 17.2 %      MPV 10.0 fL      Platelets 243 Thousands/uL      nRBC 0 /100 WBCs      Neutrophils Relative 66 %      Immat GRANS % 1 %      Lymphocytes Relative 17 %      Monocytes  Relative 9 %      Eosinophils Relative 6 %      Basophils Relative 1 %      Neutrophils Absolute 4.34 Thousands/µL      Immature Grans Absolute 0.08 Thousand/uL      Lymphocytes Absolute 1.10 Thousands/µL      Monocytes Absolute 0.62 Thousand/µL      Eosinophils Absolute 0.40 Thousand/µL      Basophils Absolute 0.05 Thousands/µL     B-Type Natriuretic Peptide(BNP) [[id number]] Collected: 12/23/23 1515    Lab Status: In process Specimen: Blood from Arm, Left Updated: 12/23/23 1526                   XR chest 2 views    (Results Pending)         Procedures  POC Cardiac US    Date/Time: 12/23/2023 4:05 PM    Performed by: Kenny Goncalves MD  Authorized by: Kenny Goncalves MD    Patient location:  ED  Other Assisting Provider: No    Procedure details:     Exam Type:  Diagnostic    Indications: dyspnea      Assessment / Evaluation for: cardiac function and pericardial effusion      Exam Type: initial exam      Image quality: diagnostic      Image availability:  Images available in PACS and video obtained  Patient Details:     Cardiac Rhythm:  Regular    Mechanical ventilation: No    Cardiac findings:     Echo technique: limited 2D      Views obtained: parasternal long axis and parasternal short axis      Pericardial effusion: absent      Tamponade physiology: absent      Wall motion: normal      LV systolic function: depressed      RV dilation: none    Pulmonary findings:     Left Lung Findings: left lung sliding      Right lung findings: right lung sliding      B-lines: 1 to 3          ED Course                             SBIRT 20yo+      Flowsheet Row Most Recent Value   Initial Alcohol Screen: US AUDIT-C     1. How often do you have a drink containing alcohol? 0 Filed at: 12/23/2023 1452   2. How many drinks containing alcohol do you have on a typical day you are drinking?  0 Filed at: 12/23/2023 1452   3a. Male UNDER 65: How often do you have five or more drinks on one occasion? 0 Filed at: 12/23/2023 1452    3b. FEMALE Any Age, or MALE 65+: How often do you have 4 or more drinks on one occassion? 0 Filed at: 12/23/2023 1452   Audit-C Score 0 Filed at: 12/23/2023 1452   TASH: How many times in the past year have you...    Used an illegal drug or used a prescription medication for non-medical reasons? Never Filed at: 12/23/2023 1452                  Medical Decision Making  58-year-old male with presentation suggestive of CHF exacerbation.  No chest pain or concerning cardiac history suggest ACS.  No risk factors for PE.  No wheezing or crackles on lung exam to suggest asthma exacerbation or pneumonia.   Plan: Chest x-ray, labs, plan to admit for diuresis.    Amount and/or Complexity of Data Reviewed  Labs: ordered.  Radiology: ordered.    Risk  Prescription drug management.  Decision regarding hospitalization.          Disposition  Final diagnoses:   CHF exacerbation (HCC)     Time reflects when diagnosis was documented in both MDM as applicable and the Disposition within this note       Time User Action Codes Description Comment    12/23/2023  5:00 PM Kenny Goncalves Add [I50.9] CHF exacerbation (HCC)           ED Disposition       ED Disposition   Admit    Condition   Stable    Date/Time   Sat Dec 23, 2023 4837    Comment   Case was discussed with ADEN and the patient's admission status was agreed to be Admission Status: inpatient status to the service of Dr. Coppola .               Follow-up Information    None         Patient's Medications   Discharge Prescriptions    No medications on file     No discharge procedures on file.    PDMP Review         Value Time User    PDMP Reviewed  Yes 10/6/2023 10:22 PM Yoel Saucedo DO             ED Provider  Attending physically available and evaluated Mesfin SHORE Rachael. I managed the patient along with the ED Attending.    Electronically Signed by           Kenny Goncalves MD  12/23/23 4309

## 2023-12-24 LAB
ANION GAP SERPL CALCULATED.3IONS-SCNC: 13 MMOL/L
BASOPHILS # BLD AUTO: 0.04 THOUSANDS/ÂΜL (ref 0–0.1)
BASOPHILS NFR BLD AUTO: 1 % (ref 0–1)
BUN SERPL-MCNC: 12 MG/DL (ref 5–25)
CALCIUM SERPL-MCNC: 7.9 MG/DL (ref 8.4–10.2)
CHLORIDE SERPL-SCNC: 96 MMOL/L (ref 96–108)
CO2 SERPL-SCNC: 27 MMOL/L (ref 21–32)
CREAT SERPL-MCNC: 1.18 MG/DL (ref 0.6–1.3)
EOSINOPHIL # BLD AUTO: 0.35 THOUSAND/ÂΜL (ref 0–0.61)
EOSINOPHIL NFR BLD AUTO: 6 % (ref 0–6)
ERYTHROCYTE [DISTWIDTH] IN BLOOD BY AUTOMATED COUNT: 17.4 % (ref 11.6–15.1)
GFR SERPL CREATININE-BSD FRML MDRD: 68 ML/MIN/1.73SQ M
GLUCOSE SERPL-MCNC: 141 MG/DL (ref 65–140)
GLUCOSE SERPL-MCNC: 158 MG/DL (ref 65–140)
GLUCOSE SERPL-MCNC: 180 MG/DL (ref 65–140)
GLUCOSE SERPL-MCNC: 202 MG/DL (ref 65–140)
GLUCOSE SERPL-MCNC: 255 MG/DL (ref 65–140)
HCT VFR BLD AUTO: 31.3 % (ref 36.5–49.3)
HGB BLD-MCNC: 9.8 G/DL (ref 12–17)
IGA SERPL-MCNC: 200 MG/DL (ref 66–433)
IGG SERPL-MCNC: 531 MG/DL (ref 635–1741)
IGM SERPL-MCNC: 87 MG/DL (ref 45–281)
IMM GRANULOCYTES # BLD AUTO: 0.09 THOUSAND/UL (ref 0–0.2)
IMM GRANULOCYTES NFR BLD AUTO: 2 % (ref 0–2)
LYMPHOCYTES # BLD AUTO: 0.86 THOUSANDS/ÂΜL (ref 0.6–4.47)
LYMPHOCYTES NFR BLD AUTO: 15 % (ref 14–44)
MAGNESIUM SERPL-MCNC: 1.7 MG/DL (ref 1.9–2.7)
MCH RBC QN AUTO: 26.6 PG (ref 26.8–34.3)
MCHC RBC AUTO-ENTMCNC: 31.3 G/DL (ref 31.4–37.4)
MCV RBC AUTO: 85 FL (ref 82–98)
MONOCYTES # BLD AUTO: 0.52 THOUSAND/ÂΜL (ref 0.17–1.22)
MONOCYTES NFR BLD AUTO: 9 % (ref 4–12)
NEUTROPHILS # BLD AUTO: 3.97 THOUSANDS/ÂΜL (ref 1.85–7.62)
NEUTS SEG NFR BLD AUTO: 67 % (ref 43–75)
NRBC BLD AUTO-RTO: 0 /100 WBCS
PLATELET # BLD AUTO: 235 THOUSANDS/UL (ref 149–390)
PMV BLD AUTO: 10.5 FL (ref 8.9–12.7)
POTASSIUM SERPL-SCNC: 3.5 MMOL/L (ref 3.5–5.3)
RBC # BLD AUTO: 3.69 MILLION/UL (ref 3.88–5.62)
SODIUM SERPL-SCNC: 136 MMOL/L (ref 135–147)
WBC # BLD AUTO: 5.83 THOUSAND/UL (ref 4.31–10.16)

## 2023-12-24 PROCEDURE — 83735 ASSAY OF MAGNESIUM: CPT | Performed by: STUDENT IN AN ORGANIZED HEALTH CARE EDUCATION/TRAINING PROGRAM

## 2023-12-24 PROCEDURE — 99232 SBSQ HOSP IP/OBS MODERATE 35: CPT | Performed by: INTERNAL MEDICINE

## 2023-12-24 PROCEDURE — 82948 REAGENT STRIP/BLOOD GLUCOSE: CPT

## 2023-12-24 PROCEDURE — 80048 BASIC METABOLIC PNL TOTAL CA: CPT | Performed by: STUDENT IN AN ORGANIZED HEALTH CARE EDUCATION/TRAINING PROGRAM

## 2023-12-24 PROCEDURE — 82784 ASSAY IGA/IGD/IGG/IGM EACH: CPT | Performed by: STUDENT IN AN ORGANIZED HEALTH CARE EDUCATION/TRAINING PROGRAM

## 2023-12-24 PROCEDURE — 83521 IG LIGHT CHAINS FREE EACH: CPT | Performed by: STUDENT IN AN ORGANIZED HEALTH CARE EDUCATION/TRAINING PROGRAM

## 2023-12-24 PROCEDURE — 84165 PROTEIN E-PHORESIS SERUM: CPT | Performed by: STUDENT IN AN ORGANIZED HEALTH CARE EDUCATION/TRAINING PROGRAM

## 2023-12-24 PROCEDURE — 85025 COMPLETE CBC W/AUTO DIFF WBC: CPT | Performed by: STUDENT IN AN ORGANIZED HEALTH CARE EDUCATION/TRAINING PROGRAM

## 2023-12-24 PROCEDURE — 99223 1ST HOSP IP/OBS HIGH 75: CPT | Performed by: STUDENT IN AN ORGANIZED HEALTH CARE EDUCATION/TRAINING PROGRAM

## 2023-12-24 RX ORDER — POTASSIUM CHLORIDE 20 MEQ/1
40 TABLET, EXTENDED RELEASE ORAL ONCE
Qty: 2 TABLET | Refills: 0 | Status: COMPLETED | OUTPATIENT
Start: 2023-12-24 | End: 2023-12-24

## 2023-12-24 RX ORDER — MAGNESIUM SULFATE HEPTAHYDRATE 40 MG/ML
2 INJECTION, SOLUTION INTRAVENOUS ONCE
Qty: 50 ML | Refills: 0 | Status: COMPLETED | OUTPATIENT
Start: 2023-12-24 | End: 2023-12-24

## 2023-12-24 RX ADMIN — MAGNESIUM SULFATE HEPTAHYDRATE 2 G: 40 INJECTION, SOLUTION INTRAVENOUS at 11:02

## 2023-12-24 RX ADMIN — BUDESONIDE AND FORMOTEROL FUMARATE DIHYDRATE 2 PUFF: 160; 4.5 AEROSOL RESPIRATORY (INHALATION) at 08:13

## 2023-12-24 RX ADMIN — EMPAGLIFLOZIN 10 MG: 10 TABLET, FILM COATED ORAL at 08:12

## 2023-12-24 RX ADMIN — INSULIN LISPRO 2 UNITS: 100 INJECTION, SOLUTION INTRAVENOUS; SUBCUTANEOUS at 12:09

## 2023-12-24 RX ADMIN — APIXABAN 5 MG: 5 TABLET, FILM COATED ORAL at 08:12

## 2023-12-24 RX ADMIN — METOPROLOL SUCCINATE 50 MG: 50 TABLET, EXTENDED RELEASE ORAL at 08:12

## 2023-12-24 RX ADMIN — ATORVASTATIN CALCIUM 10 MG: 10 TABLET, FILM COATED ORAL at 08:12

## 2023-12-24 RX ADMIN — PREGABALIN 50 MG: 50 CAPSULE ORAL at 21:08

## 2023-12-24 RX ADMIN — POTASSIUM CHLORIDE 40 MEQ: 1500 TABLET, EXTENDED RELEASE ORAL at 11:01

## 2023-12-24 RX ADMIN — UMECLIDINIUM 1 PUFF: 62.5 AEROSOL, POWDER ORAL at 08:13

## 2023-12-24 RX ADMIN — SPIRONOLACTONE 25 MG: 25 TABLET, FILM COATED ORAL at 08:12

## 2023-12-24 RX ADMIN — POTASSIUM CHLORIDE 40 MEQ: 1500 TABLET, EXTENDED RELEASE ORAL at 08:12

## 2023-12-24 RX ADMIN — APIXABAN 5 MG: 5 TABLET, FILM COATED ORAL at 17:31

## 2023-12-24 RX ADMIN — INSULIN LISPRO 1 UNITS: 100 INJECTION, SOLUTION INTRAVENOUS; SUBCUTANEOUS at 08:13

## 2023-12-24 RX ADMIN — INSULIN LISPRO 1 UNITS: 100 INJECTION, SOLUTION INTRAVENOUS; SUBCUTANEOUS at 17:32

## 2023-12-24 RX ADMIN — PANTOPRAZOLE SODIUM 40 MG: 40 TABLET, DELAYED RELEASE ORAL at 08:12

## 2023-12-24 RX ADMIN — Medication 0.5 MG/HR: at 04:56

## 2023-12-24 RX ADMIN — POTASSIUM CHLORIDE 40 MEQ: 1500 TABLET, EXTENDED RELEASE ORAL at 17:31

## 2023-12-24 RX ADMIN — BUDESONIDE AND FORMOTEROL FUMARATE DIHYDRATE 2 PUFF: 160; 4.5 AEROSOL RESPIRATORY (INHALATION) at 17:34

## 2023-12-24 NOTE — ASSESSMENT & PLAN NOTE
Lab Results   Component Value Date    HGBA1C 7.1 (A) 08/09/2023       Recent Labs     12/23/23  2141 12/24/23  0759 12/24/23  1200   POCGLU 196* 158* 202*       Blood Sugar Average: Last 72 hrs:  (P) [id number]  At home, on empagliflozin and Sitagliptin.  Continue empagliflozin as above.  Hold Sitagliptin.  Correctional insulin with meals.

## 2023-12-24 NOTE — ASSESSMENT & PLAN NOTE
Wt Readings from Last 3 Encounters:   12/24/23 (!) 157 kg (345 lb 6.4 oz)   12/15/23 (!) 157 kg (346 lb)   12/13/23 (!) 157 kg (346 lb 3.2 oz)     Acute on chronic heart failure with preserved EF. Stage C, NYHA III.  Etiology A-fib, hypertension, obesity  Dry weight 335 pounds, weight today 349 lb.  proBNP 245, previously in the 90-160s.  Echo 4/2023 EF of 50%, grade 2 diastolic dysfunction.  Diuretic regimen Bumex 6 mg twice daily, spironolactone 25 mg daily, metolazone 2.5 mg as needed and Jardiance 10 mg daily.  Acute on chronic exacerbation, not responding to outpatient treatment.  He was started on Bumex drip by the ED, will continue.  Continue spironolactone, Jardiance.  Continue current bumex drip  Continue potassium supplementation with diuretics.  Monitor BMP  Strict ins and outs, daily standing weights, low-sodium diet and 1800 mL fluid restriction.  Consult heart failure.

## 2023-12-24 NOTE — UTILIZATION REVIEW
Initial Clinical Review    Admission: Date/Time/Statement:   Admission Orders (From admission, onward)       Ordered        12/23/23 1700  INPATIENT ADMISSION  Once                          Orders Placed This Encounter   Procedures    INPATIENT ADMISSION     Standing Status:   Standing     Number of Occurrences:   1     Order Specific Question:   Level of Care     Answer:   Med Surg [16]     Order Specific Question:   Estimated length of stay     Answer:   More than 2 Midnights     Order Specific Question:   Certification     Answer:   I certify that inpatient services are medically necessary for this patient for a duration of greater than two midnights. See H&P and MD Progress Notes for additional information about the patient's course of treatment.     ED Arrival Information       Expected   12/23/2023 15:00    Arrival   12/23/2023 14:45    Acuity   Urgent              Means of arrival   Ambulance    Escorted by   [facility]    Service   Hospitalist    Admission type   Emergency              Arrival complaint   Edema, Shortness of Breath             Chief Complaint   Patient presents with    Shortness of Breath     Pt c/o SOB since yesterday - spoke to his Cardiology office and was told to come here.       Initial Presentation: 58 y.o. male with PMHx: heart failure with preserved EF, A-fib, severe asthma, diabetes mellitus and diabetic neuropathy, who presented to [facility] ED via EMS due to worsening shortness of breath, 10 lb weight gain, worsening lower extremity edema and abdominal distention.  On exam, abd distention and BL LE edema noted.  Labs revealed .  IV Bumex infusion started in ED.  Plan:  Admit Inpatient status to med surg telemetry dt Heart Failure, continue Bumex, Heart Failure consult, continue spironolactone and Jardiance, monitor electrolytes and replete prn, strict IO and daily wts, low Na diet with fluid restriction. Continue home meds, start accuchecks w/  SSI.    Date: 12/24   Day 2:  No new complaints today, abd distention and BL LE edema continues, rales present. Continue above tx plan including Bumex drip, monitor electrolytes and replete prn, strict IO and daily wts, await Heart Failure consult, continue accuchecks w/ SSI, labs, home meds.     ED Triage Vitals [12/23/23 1449]   Temperature Pulse Respirations Blood Pressure SpO2   97.8 °F (36.6 °C) 83 18 149/70 99 %      Temp Source Heart Rate Source Patient Position - Orthostatic VS BP Location FiO2 (%)   Oral Monitor Sitting Right arm --      Pain Score       No Pain          Wt Readings from Last 1 Encounters:   12/24/23 (!) 157 kg (345 lb 6.4 oz)     Additional Vital Signs:   Date/Time Temp Pulse Resp BP MAP (mmHg) SpO2 O2 Device Patient Position - Orthostatic VS   12/24/23 0800 -- -- -- -- -- -- None (Room air) --   12/24/23 07:03:45 98.9 °F (37.2 °C) 104 18 149/88 108 97 % None (Room air) Lying   12/24/23 02:32:38 -- 93 -- 123/52 76 96 % -- --   12/23/23 2209 98.6 °F (37 °C) -- 20 143/81 -- -- -- Sitting   12/23/23 21:41:47 98.6 °F (37 °C) 96 -- 143/81 102 98 % -- --   12/23/23 1830 -- 106 Abnormal  22 157/75 108 100 % None (Room air) Sitting   12/23/23 1700 -- 86 22 150/77 107 100 % None (Room air) Lying   12/23/23 1600 -- 88 18 141/72 96 99 % None (Room air) Lying   12/23/23 1453 -- -- -- -- -- -- None (Room air)  --   O2 Device: SOB at 12/23/23 1453   12/23/23 1449 97.8 °F (36.6 °C) 83 18 149/70 -- 99 % None (Room air) Sitting     Pertinent Labs/Diagnostic Test Results:   XR chest 2 views   Final Result by Ray Townsend MD (12/24 1006)      No acute cardiopulmonary disease.                  Workstation performed: [id number]               Results from last 7 days   Lab Units 12/24/23  0443 12/23/23  1515   WBC Thousand/uL 5.83 6.59   HEMOGLOBIN g/dL 9.8* 10.5*   HEMATOCRIT % 31.3* 34.1*   PLATELETS Thousands/uL 235 243   NEUTROS ABS Thousands/µL 3.97 4.34         Results from last 7 days   Lab  Units 12/24/23  0443 12/23/23  1515   SODIUM mmol/L 136 137   POTASSIUM mmol/L 3.5 3.7   CHLORIDE mmol/L 96 101   CO2 mmol/L 27 22   ANION GAP mmol/L 13 14   BUN mg/dL 12 11   CREATININE mg/dL 1.18 1.13   EGFR ml/min/1.73sq m 68 72   CALCIUM mg/dL 7.9* 8.0*   MAGNESIUM mg/dL 1.7*  --      Results from last 7 days   Lab Units 12/23/23  1515   AST U/L 18   ALT U/L 9   ALK PHOS U/L 105*   TOTAL PROTEIN g/dL 6.5   ALBUMIN g/dL 3.6   TOTAL BILIRUBIN mg/dL 0.68     Results from last 7 days   Lab Units 12/24/23  0759 12/23/23  2141   POC GLUCOSE mg/dl 158* 196*     Results from last 7 days   Lab Units 12/24/23  0443 12/23/23  1515   GLUCOSE RANDOM mg/dL 141* 174*     Results from last 7 days   Lab Units 12/23/23  1515   BNP pg/mL 245*       ED Treatment:   Medication Administration from 12/23/2023 1417 to 12/23/2023 2137         Date/Time Order Dose Route Action     12/23/2023 1652 EST bumetanide (BUMEX) 12.5 mg infusion 50 mL 0.5 mg/hr Intravenous New Bag     12/23/2023 1933 EST apixaban (ELIQUIS) tablet 5 mg 5 mg Oral Given     12/23/2023 2053 EST potassium chloride (K-DUR,KLOR-CON) CR tablet 40 mEq 40 mEq Oral Given          Past Medical History:   Diagnosis Date    Acute gastritis without hemorrhage     last assessed 3/24/17    Asthma     Atrial fibrillation (HCC)     Bilateral leg edema 02/19/2020    CHF (congestive heart failure) ([facility])     Coronary artery disease     Daytime sleepiness 07/17/2019    Diabetes mellitus ([facility])     Dyspnea on exertion 10/11/2021    Edema of both feet 01/23/2020    Gastric bypass status for obesity     Hyperlipidemia     Hypertension     Hypokalemia 11/14/2021    Impaired fasting glucose     last assessed 5/10/17    Knee sprain, bilateral 06/14/2018    Leg cramps 06/16/2022    Obesity     Viral gastroenteritis     last assessed 11/4/16     Present on Admission:   Acute on chronic heart failure with preserved ejection fraction (HFpEF) ([facility])   Hyperlipidemia   Paroxysmal atrial fibrillation  (HCC)   Type 2 diabetes mellitus with hyperglycemia, without long-term current use of insulin (HCC)   Severe persistent asthma      Admitting Diagnosis: Edema [R60.9]  Shortness of breath [R06.02]  CHF exacerbation (HCC) [I50.9]  Age/Sex: 58 y.o. male  Admission Orders:  Scheduled Medications:  apixaban, 5 mg, Oral, BID  atorvastatin, 10 mg, Oral, Daily  budesonide-formoterol, 2 puff, Inhalation, BID  Empagliflozin, 10 mg, Oral, Daily  insulin lispro, 1-6 Units, Subcutaneous, TID AC  magnesium sulfate, 2 g, Intravenous, Once  metoprolol succinate, 50 mg, Oral, Daily  pantoprazole, 40 mg, Oral, Daily  potassium chloride, 40 mEq, Oral, BID  potassium chloride, 40 mEq, Oral, Once  pregabalin, 50 mg, Oral, HS  spironolactone, 25 mg, Oral, Daily  umeclidinium, 1 puff, Inhalation, Daily      Continuous IV Infusions:  bumetanide (BUMEX) 12.5 mg infusion 50 mL, 0.5 mg/hr, Intravenous, Continuous      PRN Meds:  albuterol, 2 puff, Inhalation, Q4H PRN    scd    IP CONSULT TO CARDIOLOGY  IP CONSULT TO NUTRITION SERVICES    Network Utilization Review Department  ATTENTION: Please call with any questions or concerns to [phone] and carefully listen to the prompts so that you are directed to the right person. All voicemails are confidential.   For Discharge needs, contact Care Management DC Support Team at [phone] opt. [phone]  Send all requests for admission clinical reviews, approved or denied determinations and any other requests to dedicated fax number below belonging to the [place] where the patient is receiving treatment. List of dedicated fax numbers for the Facilities:  FACILITY NAME UR FAX NUMBER   ADMISSION DENIALS (Administrative/Medical Necessity) [phone]   DISCHARGE SUPPORT TEAM (NETWORK) [phone]   PARENT CHILD HEALTH (Maternity/NICU/Pediatrics) [phone]   [facility] [phone]   [facility] [phone]   On license of [facility]  [phone]   [facility] [phone]   [facility] [phone]   [facility] [phone]   [facility] [phone]   [facility] [phone]   [facility] [phone]   Critical access hospital [phone]   [facility] [phone]

## 2023-12-24 NOTE — CONSULTS
Cardiology Consult H&P  Mesfin Cano 58 y.o. male MRN: [id number]  Unit/Bed#: -01 Encounter: [id number]  Physician Requesting Consult: Faheem Banda DO  Reason for Consult: CHF Exacerbation    HPI  58 y.o. male with a past medical history of heart failure preserved ejection fraction (EF 50% and G2DD), paroxysmal atrial fibrillation, HLD, severe asthma and type 2 diabetes who presented to the emergency department on 12/23 complaining of 1 week of worsening shortness of breath and lower extremity edema.  He was noted to be volume overloaded on exam and was started on IV Bumex for an acute on chronic heart failure exacerbation.  Cardiology was consulted.    Today patient was seen and examined at bedside.  He appears volume overloaded and complains of mild shortness of breath and lower extremity edema.  He does report that it has improved since his admission.  He reports that his symptoms began around 1 week ago and have progressively worsened.  He denies any changes in diet or lifestyle.  He also denies any recent viral illnesses.  Otherwise he had no other acute complaints.    Prior Cardiac Imaging  -TTE 4/11/2023:    Left Ventricle: Left ventricular cavity size is mildly dilated. Wall thickness is upper normal. The left ventricular ejection fraction is 50%. Systolic function is low normal. Wall motion is normal. Diastolic function is moderately abnormal, consistent with grade II (pseudonormal) relaxation.    Left Atrium: The atrium is mildly dilated.    Mitral Valve: There is trace regurgitation.    A&P  # Acute on chronic diastolic heart failure exacerbation  Patient has a longstanding history of HFpEF with an EF of 50% and G2 DD, stage C, NYHA III.  Etiology is A-fib, hypertension and obesity.  Home medications include Bumex 6 mg twice daily, spironolactone 25 mg daily, metolazone 2.5 mg as needed and Jardiance 10 mg daily.  Patient reports that he took metolazone 3 times over the last week.  Patient appears  "volume overloaded on exam, but does report improvement  Continue diuresis with Bumex drip  Continue metoprolol succinate, spironolactone and Jardiance  Monitor potassium closely and replete as needed  Low-sodium diet 1800 mL fluid restriction  Monitor I's and O's and daily weights  Continue telemetry monitoring  Will consider interrogating CardioMEMS  Patient will likely require an increased diuretic regimen at time of discharge    # Paroxysmal A-fib  Continue Eliquis and metoprolol succinate  Continue telemetry monitoring    # HLD  Continue atorvastatin        Intake/Output Summary (Last 24 hours) at 12/24/2023 1826  Last data filed at 12/24/2023 1647  Gross per 24 hour   Intake 360 ml   Output 3600 ml   Net -3240 ml         David Fermin MD  -PGY-4 Cardiology Fellow  -Arvind text enabled      ======================================================    Physical exam  Vitals: Blood pressure 130/80, pulse 88, temperature 98.4 °F (36.9 °C), temperature source Oral, resp. rate 17, height 6' 1\" (1.854 m), weight (!) 157 kg (345 lb 6.4 oz), SpO2 97%.  Gen: Well appearing. Obese.  Psych: AOx3  Skin: Intact  Cardiac: S1, S2, regular rate, no S3 or S4 appreciated. No murmurs. +2 PT, radial pulses. Bilateral lower extremity edema present. No carotid bruits. JVD+.  Resp: Decreased breath sounds in lower lobes bilaterally. No wheezing.   Rheum: No joint deformities in UE or LE    ======================================================    TREADMILL STRESS  No results found for this or any previous visit.     ----------------------------------------------------------------------------------------------  NUCLEAR STRESS TEST: No results found for this or any previous visit.    Results for orders placed during the hospital encounter of 09/01/22    NM myocardial perfusion spect (rx stress and/or rest)    Interpretation Summary    Stress ECG: No ST deviation is noted. The ECG was negative for ischemia. The stress ECG is negative for " ischemia after pharmacologic vasodilation, without reproduction of symptoms.    Perfusion: There is a left ventricular perfusion defect that is medium in size with moderate reduction in uptake present in the basal to mid inferior location(s) that is fixed suggestive of an inferior infarct. Significant ischemia is not present.    Stress Function: Left ventricular function post-stress is abnormal. There was a single regional abnormality during stress. Post-stress ejection fraction is 53 %.    Abnormal study due to the presence of an inferior and inferolateral infarct without significant ischemia.      --------------------------------------------------------------------------------  CATH:  No results found for this or any previous visit.    --------------------------------------------------------------------------------  ECHO:   Results for orders placed during the hospital encounter of 21    Echo complete with contrast if indicated    Narrative  [address] [id number] [phone]    Transthoracic Echocardiogram  2D, M-mode, Doppler, and Color Doppler    Study date:  2021    Patient: KARY GRANADOS  MR number: [id number]  Account number: [id number]  : 1965  Age: 56 years  Gender: Male  Status: Inpatient  Location: Bedside  Height: 73 in  Weight: 359.3 lb  BP: 177/ 91 mmHg    Indications: Assess congestive heart failure.    Diagnoses: I42.9 - Cardiomyopathy, unspecified    Sonographer:  TOSHIA Foster  Primary Physician:  Soo Chavez MD  Group:  [facility]  Cardiology Fellow:  Babak Badillo  Interpreting Physician:  Rohith Syed MD    SUMMARY    LEFT VENTRICLE:  Size was at the upper limits of normal.  Systolic function was at the lower limits of normal. Ejection fraction was estimated to be 50 %.  There were no regional wall motion abnormalities.  There was akinesis of the basal inferior  and basal-mid inferolateral wall(s).  There was no evidence of concentric hypertrophy.  Doppler parameters were consistent with abnormal left ventricular relaxation (grade 1 diastolic dysfunction).    LEFT ATRIUM:  The atrium was mildly dilated.    MITRAL VALVE:  There was mild annular calcification.  There was trace regurgitation.    TRICUSPID VALVE:  There was trace regurgitation.    IVC, HEPATIC VEINS:  The inferior vena cava was mildly dilated.    PROCEDURE: The procedure was performed at the bedside. This was a routine study. The transthoracic approach was used. The study included complete 2D imaging, M-mode, complete spectral Doppler, and color Doppler. The heart rate was 87 bpm,  at the start of the study. Images were obtained from the parasternal, apical, subcostal, and suprasternal notch acoustic windows. Echocardiographic views were limited due to restricted patient mobility, decreased penetration, and lung  interference. This was a technically difficult study.    LEFT VENTRICLE: Size was at the upper limits of normal. Systolic function was at the lower limits of normal. Ejection fraction was estimated to be 50 %. There were no regional wall motion abnormalities. There was akinesis of the basal  inferior and basal-mid inferolateral wall(s). Wall thickness was normal. There was no evidence of concentric hypertrophy. DOPPLER: Doppler parameters were consistent with abnormal left ventricular relaxation (grade 1 diastolic dysfunction).    RIGHT VENTRICLE: The size was normal. Systolic function was normal. Wall thickness was normal.    LEFT ATRIUM: The atrium was mildly dilated.    RIGHT ATRIUM: Size was normal.    MITRAL VALVE: There was mild annular calcification. Valve structure was normal. There was normal leaflet separation. DOPPLER: The transmitral velocity was within the normal range. There was no evidence for stenosis. There was trace  regurgitation.    AORTIC VALVE: The valve was trileaflet. Leaflets  exhibited mildly increased thickness, mild calcification, and normal cuspal separation. DOPPLER: Transaortic velocity was within the normal range. There was no evidence for stenosis. There  was no significant regurgitation.    TRICUSPID VALVE: The valve structure was normal. There was normal leaflet separation. DOPPLER: The transtricuspid velocity was within the normal range. There was no evidence for stenosis. There was trace regurgitation.    PULMONIC VALVE: Leaflets exhibited normal thickness, no calcification, and normal cuspal separation. DOPPLER: The transpulmonic velocity was within the normal range. There was no significant regurgitation.    PERICARDIUM: There was no pericardial effusion. The pericardium was normal in appearance.    AORTA: The root exhibited normal size. The ascending aorta was normal in size.    SYSTEMIC VEINS: IVC: The inferior vena cava was mildly dilated. Respirophasic changes were normal.    SYSTEM MEASUREMENT TABLES    2D  %FS: 21.53 %  Ao Diam: 3.19 cm  Ao asc: 3.32 cm  EDV(Teich): 188.84 ml  EF(Teich): 42.83 %  ESV(Teich): 107.95 ml  IVSd: 0.8 cm  LA Area: 19.94 cm2  LA Diam: 4.66 cm  LVEDV MOD A4C: 126.08 ml  LVEF MOD A4C: 46.23 %  LVESV MOD A4C: 67.8 ml  LVIDd: 6.13 cm  LVIDs: 4.81 cm  LVLd A4C: 8.23 cm  LVLs A4C: 7.32 cm  LVPWd: 0.78 cm  RA Area: 20.73 cm2  RVIDd: 3.74 cm  SV MOD A4C: 58.28 ml  SV(Teich): 80.89 ml    MM  TAPSE: 2.77 cm    PW  E' Sept: 0.08 m/s  E/E' Sept: 9.01  MV A Zach: 0.84 m/s  MV Dec [place]aca: 3.3 m/s2  MV DecT: 228.2 ms  MV E Zach: 0.75 m/s  MV E/A Ratio: 0.89  MV PHT: 66.18 ms  MVA By PHT: 3.32 cm2    [employer] Accredited Echocardiography Laboratory    Prepared and electronically signed by    Rohith Syed MD  Signed 20-Jul-2021 07:13:15    No results found for this or any previous visit.    --------------------------------------------------------------------------------  HOLTER  No results found for this or any previous  visit.    --------------------------------------------------------------------------------  CAROTIDS  No results found for this or any previous visit.       [unfilled]   ======================================================      Review of Systems   Constitutional:  Negative for activity change, appetite change, chills, fatigue and fever.   HENT:  Negative for congestion, ear discharge, ear pain, hearing loss, sinus pain, sore throat, tinnitus and trouble swallowing.    Eyes:  Negative for pain and visual disturbance.   Respiratory:  Positive for shortness of breath. Negative for cough, chest tightness and wheezing.    Cardiovascular:  Positive for leg swelling. Negative for chest pain.   Gastrointestinal:  Negative for abdominal distention, abdominal pain, anal bleeding and blood in stool.   Endocrine: Negative for cold intolerance and heat intolerance.   Genitourinary:  Negative for difficulty urinating, dysuria and frequency.   Musculoskeletal:  Negative for arthralgias and myalgias.   Skin:  Negative for rash.   Allergic/Immunologic: Negative for environmental allergies and food allergies.   Neurological:  Negative for dizziness, light-headedness, numbness and headaches.   Hematological:  Negative for adenopathy.   Psychiatric/Behavioral:  Negative for agitation, behavioral problems and confusion.      ROS as noted above, otherwise 12 point review of systems was performed and is negative.     Historical Information   Past Medical History:   Diagnosis Date    Acute gastritis without hemorrhage     last assessed 3/24/17    Asthma     Atrial fibrillation (HCC)     Bilateral leg edema 02/19/2020    CHF (congestive heart failure) ([facility])     Coronary artery disease     Daytime sleepiness 07/17/2019    Diabetes mellitus ([facility])     Dyspnea on exertion 10/11/2021    Edema of both feet 01/23/2020    Gastric bypass status for obesity     Hyperlipidemia     Hypertension     Hypokalemia 11/14/2021    Impaired fasting  glucose     last assessed 5/10/17    Knee sprain, bilateral 2018    Leg cramps 2022    Obesity     Viral gastroenteritis     last assessed 16     Past Surgical History:   Procedure Laterality Date    CARDIAC CATHETERIZATION N/A 3/9/2022    Procedure: CARDIAC RHC W/ PRESSURE SENSOR;  Surgeon: Aminata Wilcox MD;  Location: [facility];  Service: Cardiology    CARDIAC CATHETERIZATION N/A 2022    Procedure: Cardiac catheterization;  Surgeon: Yolanda Del Rosario DO;  Location: [facility];  Service: Cardiology    CARDIAC CATHETERIZATION N/A 2022    Procedure: Cardiac Coronary Angiogram;  Surgeon: Yolanda Del Rosario DO;  Location: [facility];  Service: Cardiology    CARDIAC CATHETERIZATION N/A 10/11/2023    Procedure: Cardiac RHC;  Surgeon: Yoel Darden MD;  Location: [facility];  Service: Cardiology    CARPAL TUNNEL RELEASE      bilateral    GASTRIC BYPASS      with han en y    HERNIA REPAIR      ventral inscisional    TONSILLECTOMY       Social History     Substance and Sexual Activity   Alcohol Use Yes    Alcohol/week: 2.0 standard drinks of alcohol    Types: 2 Shots of liquor per week    Comment: social     Social History     Substance and Sexual Activity   Drug Use No     Social History     Tobacco Use   Smoking Status Former    Types: Pipe, Cigars    Start date:     Quit date: 2021    Years since quittin.9    Passive exposure: Never   Smokeless Tobacco Never   Tobacco Comments    cigar once a day     Family History:   Family History   Problem Relation Age of Onset    Stroke Mother     Hypertension Father     Cancer Father     COPD Father        Meds/Allergies   Hospital Medications:   Current Facility-Administered Medications   Medication Dose Route Frequency    albuterol (PROVENTIL HFA,VENTOLIN HFA) inhaler 2 puff  2 puff Inhalation Q4H PRN    apixaban (ELIQUIS) tablet 5 mg  5 mg Oral BID    atorvastatin (LIPITOR) tablet 10 mg  10 mg Oral  Daily    budesonide-formoterol (SYMBICORT) 160-4.5 mcg/act inhaler 2 puff  2 puff Inhalation BID    bumetanide (BUMEX) 12.5 mg infusion 50 mL  0.5 mg/hr Intravenous Continuous    Empagliflozin (JARDIANCE) tablet 10 mg  10 mg Oral Daily    insulin lispro (HumaLOG) 100 units/mL subcutaneous injection 1-6 Units  1-6 Units Subcutaneous TID AC    metoprolol succinate (TOPROL-XL) 24 hr tablet 50 mg  50 mg Oral Daily    pantoprazole (PROTONIX) EC tablet 40 mg  40 mg Oral Daily    potassium chloride (K-DUR,KLOR-CON) CR tablet 40 mEq  40 mEq Oral BID    pregabalin (LYRICA) capsule 50 mg  50 mg Oral HS    spironolactone (ALDACTONE) tablet 25 mg  25 mg Oral Daily    umeclidinium 62.5 mcg/actuation inhaler AEPB 1 puff  1 puff Inhalation Daily     Home Medications:   Medications Prior to Admission   Medication    albuterol (PROVENTIL HFA,VENTOLIN HFA) 90 mcg/act inhaler    apixaban (Eliquis) 5 mg    atorvastatin (LIPITOR) 10 mg tablet    budesonide-formoterol (Symbicort) 160-4.5 mcg/act inhaler    bumetanide (BUMEX) 2 mg tablet    Empagliflozin (JARDIANCE) 10 MG TABS tablet    fluticasone (FLONASE) 50 mcg/act nasal spray    loratadine (CLARITIN) 10 mg tablet    metolazone (ZAROXOLYN) 2.5 mg tablet    metoprolol succinate (TOPROL-XL) 50 mg 24 hr tablet    montelukast (SINGULAIR) 10 mg tablet    pantoprazole (PROTONIX) 40 mg tablet    potassium chloride (K-DUR,KLOR-CON) 20 mEq tablet    pregabalin (LYRICA) 50 mg capsule    sitaGLIPtin (Januvia) 100 mg tablet    Spiriva Respimat 1.25 MCG/ACT AERS inhaler    spironolactone (ALDACTONE) 25 mg tablet    Accu-Chek FastClix Lancets MISC    Blood Glucose Monitoring Suppl (Accu-Chek Guide) w/Device KIT    EPINEPHrine (EPIPEN) 0.3 mg/0.3 mL SOAJ    Fasenra Pen 30 MG/ML SOAJ       Allergies   Allergen Reactions    Azithromycin Other (See Comments)     Shaky, uneasy feeling     Bactrim [Sulfamethoxazole-Trimethoprim] Other (See Comments)     shakiness       Objective   Vitals: Blood pressure  "130/80, pulse 88, temperature 98.4 °F (36.9 °C), temperature source Oral, resp. rate 17, height 6' 1\" (1.854 m), weight (!) 157 kg (345 lb 6.4 oz), SpO2 97%.  Orthostatic Blood Pressures      Flowsheet Row Most Recent Value   Blood Pressure 130/80 filed at 12/24/2023 1528   Patient Position - Orthostatic VS Lying filed at 12/24/2023 1528              Intake/Output Summary (Last 24 hours) at 12/24/2023 1826  Last data filed at 12/24/2023 1647  Gross per 24 hour   Intake 360 ml   Output 3600 ml   Net -3240 ml       Invasive Devices       Peripheral Intravenous Line  Duration             Peripheral IV 12/14/23 Left;Proximal;Ventral (anterior) Forearm 10 days    Peripheral IV 12/23/23 Distal;Left;Upper;Ventral (anterior) Arm 1 day                      Lab Results: I have personally reviewed pertinent lab results.    Results from last 7 days   Lab Units 12/24/23 0443 12/23/23  1515   WBC Thousand/uL 5.83 6.59   HEMOGLOBIN g/dL 9.8* 10.5*   HEMATOCRIT % 31.3* 34.1*   PLATELETS Thousands/uL 235 243     Results from last 7 days   Lab Units 12/24/23  0443 12/23/23  1515   POTASSIUM mmol/L 3.5 3.7   CHLORIDE mmol/L 96 101   CO2 mmol/L 27 22   BUN mg/dL 12 11   CREATININE mg/dL 1.18 1.13   CALCIUM mg/dL 7.9* 8.0*         Results from last 7 days   Lab Units 12/24/23 0443   MAGNESIUM mg/dL 1.7*      "

## 2023-12-24 NOTE — PROGRESS NOTES
[facility]  Progress Note  Name: Mesfin Cano I  MRN: [id number]  Unit/Bed#: -01 I Date of Admission: 12/23/2023   Date of Service: 12/24/2023 I Hospital Day: 1    Assessment/Plan   Diabetic neuropathy (HCC)  Assessment & Plan  PDMP reviewed, continue Lyrica.  (P) [phone]      Paroxysmal atrial fibrillation (HCC)  Assessment & Plan  Continue metoprolol succinate 50 mg daily.  Continue Eliquis for anticoagulation.    Severe persistent asthma  Assessment & Plan  Not currently in exacerbation.  Continue albuterol, Symbicort and Spiriva equivalent.    Hyperlipidemia  Assessment & Plan  Continue home atorvastatin.    Type 2 diabetes mellitus with hyperglycemia, without long-term current use of insulin ([facility])  Assessment & Plan  Lab Results   Component Value Date    HGBA1C 7.1 (A) 08/09/2023       Recent Labs     12/23/23  2141 12/24/23  0759 12/24/23  1200   POCGLU 196* 158* 202*       Blood Sugar Average: Last 72 hrs:  (P) [id number]  At home, on empagliflozin and Sitagliptin.  Continue empagliflozin as above.  Hold Sitagliptin.  Correctional insulin with meals.    * Acute on chronic heart failure with preserved ejection fraction (HFpEF) ([facility])  Assessment & Plan  Wt Readings from Last 3 Encounters:   12/24/23 (!) 157 kg (345 lb 6.4 oz)   12/15/23 (!) 157 kg (346 lb)   12/13/23 (!) 157 kg (346 lb 3.2 oz)     Acute on chronic heart failure with preserved EF. Stage C, NYHA III.  Etiology A-fib, hypertension, obesity  Dry weight 335 pounds, weight today 349 lb.  proBNP 245, previously in the 90-160s.  Echo 4/2023 EF of 50%, grade 2 diastolic dysfunction.  Diuretic regimen Bumex 6 mg twice daily, spironolactone 25 mg daily, metolazone 2.5 mg as needed and Jardiance 10 mg daily.  Acute on chronic exacerbation, not responding to outpatient treatment.  He was started on Bumex drip by the ED, will continue.  Continue spironolactone, Jardiance.  Continue current  bumex drip  Continue potassium supplementation with diuretics.  Monitor BMP  Strict ins and outs, daily standing weights, low-sodium diet and 1800 mL fluid restriction.  Consult heart failure.               VTE Pharmacologic Prophylaxis: VTE Score: 4 High Risk (Score >/= 5) - Pharmacological DVT Prophylaxis Ordered: apixaban (Eliquis). Sequential Compression Devices Ordered.    Mobility:   JH-HLM Achieved: 6: Walk 10 steps or more  HLM Goal achieved. Continue to encourage appropriate mobility.    Patient Centered Rounds: I performed bedside rounds with nursing staff today.   Discussions with Specialists or Other Care Team Provider: n/a    Education and Discussions with Family / Patient: Patient declined call to .     Total Time Spent on Date of Encounter in care of patient: 35 mins. This time was spent on one or more of the following: performing physical exam; counseling and coordination of care; obtaining or reviewing history; documenting in the medical record; reviewing/ordering tests, medications or procedures; communicating with other healthcare professionals and discussing with patient's family/caregivers.    Current Length of Stay: 1 day(s)  Current Patient Status: Inpatient   Certification Statement: The patient will continue to require additional inpatient hospital stay due to pending further IV diuretics transition to oral  Discharge Plan: Anticipate discharge in 48-72 hrs to discharge location to be determined pending rehab evaluations.    Code Status: Level 1 - Full Code    Subjective:   Patient denies any acute complaints today    Objective:     Vitals:   Temp (24hrs), Av.7 °F (37.1 °C), Min:98.4 °F (36.9 °C), Max:98.9 °F (37.2 °C)    Temp:  [98.4 °F (36.9 °C)-98.9 °F (37.2 °C)] 98.4 °F (36.9 °C)  HR:  [] 88  Resp:  [17-22] 17  BP: (123-157)/(52-88) 130/80  SpO2:  [96 %-100 %] 97 %  Body mass index is 45.57 kg/m².     Input and Output Summary (last 24 hours):     Intake/Output  Summary (Last 24 hours) at 12/24/2023 1555  Last data filed at 12/24/2023 1528  Gross per 24 hour   Intake 360 ml   Output 3275 ml   Net -2915 ml       Physical Exam:   Physical Exam  Vitals and nursing note reviewed.   Constitutional:       General: He is not in acute distress.     Appearance: He is well-developed. He is not toxic-appearing or diaphoretic.   HENT:      Head: Normocephalic and atraumatic.   Eyes:      General: No scleral icterus.     Conjunctiva/sclera: Conjunctivae normal.   Cardiovascular:      Rate and Rhythm: Normal rate and regular rhythm.      Heart sounds: No murmur heard.     No friction rub. No gallop.   Pulmonary:      Effort: Pulmonary effort is normal. No respiratory distress.      Breath sounds: No stridor. Rales present. No wheezing or rhonchi.   Chest:      Chest wall: No tenderness.   Abdominal:      General: There is distension.      Palpations: Abdomen is soft. There is no mass.      Tenderness: There is no abdominal tenderness. There is no guarding or rebound.      Hernia: No hernia is present.   Musculoskeletal:         General: No swelling or tenderness.      Cervical back: Neck supple.      Right lower leg: Edema present.      Left lower leg: Edema present.   Skin:     General: Skin is warm and dry.      Capillary Refill: Capillary refill takes less than 2 seconds.   Neurological:      Mental Status: He is alert and oriented to person, place, and time.   Psychiatric:         Mood and Affect: Mood normal.          Additional Data:     Labs:  Results from last 7 days   Lab Units 12/24/23  0443   WBC Thousand/uL 5.83   HEMOGLOBIN g/dL 9.8*   HEMATOCRIT % 31.3*   PLATELETS Thousands/uL 235   NEUTROS PCT % 67   LYMPHS PCT % 15   MONOS PCT % 9   EOS PCT % 6     Results from last 7 days   Lab Units 12/24/23  0443 12/23/23  1515   SODIUM mmol/L 136 137   POTASSIUM mmol/L 3.5 3.7   CHLORIDE mmol/L 96 101   CO2 mmol/L 27 22   BUN mg/dL 12 11   CREATININE mg/dL 1.18 1.13   ANION GAP  mmol/L 13 14   CALCIUM mg/dL 7.9* 8.0*   ALBUMIN g/dL  --  3.6   TOTAL BILIRUBIN mg/dL  --  0.68   ALK PHOS U/L  --  105*   ALT U/L  --  9   AST U/L  --  18   GLUCOSE RANDOM mg/dL 141* 174*         Results from last 7 days   Lab Units 12/24/23  1200 12/24/23  0759 12/23/23  2141   POC GLUCOSE mg/dl 202* 158* 196*               Lines/Drains:  Invasive Devices       Peripheral Intravenous Line  Duration             Peripheral IV 12/14/23 Left;Proximal;Ventral (anterior) Forearm 10 days    Peripheral IV 12/23/23 Distal;Left;Upper;Ventral (anterior) Arm 1 day                      Telemetry:  Telemetry Orders (From admission, onward)               24 Hour Telemetry Monitoring  Continuous x 24 Hours (Telem)        Question:  Reason for 24 Hour Telemetry  Answer:  Decompensated CHF- and any one of the following: continuous diuretic infusion or total diuretic dose >200 mg daily, associated electrolyte derangement (I.e. K < 3.0), ionotropic drip (continuous infusion), hx of ventricular arrhythmia, or new EF < 35%                                Imaging: No pertinent imaging reviewed.    Recent Cultures (last 7 days):         Last 24 Hours Medication List:   Current Facility-Administered Medications   Medication Dose Route Frequency Provider Last Rate    albuterol  2 puff Inhalation Q4H PRN William Stahl MD      apixaban  5 mg Oral BID William Stahl MD      atorvastatin  10 mg Oral Daily William Stahl MD      budesonide-formoterol  2 puff Inhalation BID William Stahl MD      bumetanide (BUMEX) 12.5 mg infusion 50 mL  0.5 mg/hr Intravenous Continuous William Stahl MD 0.5 mg/hr (12/24/23 5563)    Empagliflozin  10 mg Oral Daily William Stahl MD      insulin lispro  1-6 Units Subcutaneous TID AC William Stahl MD      metoprolol succinate  50 mg Oral Daily William Stalh MD      pantoprazole  40 mg Oral Daily William Stahl MD      potassium chloride  40 mEq Oral BID William Stahl MD      pregabalin  50 mg Oral HS William Stahl MD      spironolactone  25  mg Oral Daily William Stahl MD      umeclidinium  1 puff Inhalation Daily William Stahl MD          Today, Patient Was Seen By: Faheem Banda DO    **Please Note: This note may have been constructed using a voice recognition system.**     yes...

## 2023-12-25 LAB
ANION GAP SERPL CALCULATED.3IONS-SCNC: 11 MMOL/L
ANION GAP SERPL CALCULATED.3IONS-SCNC: 12 MMOL/L
ANION GAP SERPL CALCULATED.3IONS-SCNC: 12 MMOL/L
BUN SERPL-MCNC: 12 MG/DL (ref 5–25)
BUN SERPL-MCNC: 15 MG/DL (ref 5–25)
BUN SERPL-MCNC: 16 MG/DL (ref 5–25)
CALCIUM SERPL-MCNC: 7.5 MG/DL (ref 8.4–10.2)
CALCIUM SERPL-MCNC: 7.6 MG/DL (ref 8.4–10.2)
CALCIUM SERPL-MCNC: 7.8 MG/DL (ref 8.4–10.2)
CHLORIDE SERPL-SCNC: 93 MMOL/L (ref 96–108)
CHLORIDE SERPL-SCNC: 95 MMOL/L (ref 96–108)
CHLORIDE SERPL-SCNC: 97 MMOL/L (ref 96–108)
CO2 SERPL-SCNC: 28 MMOL/L (ref 21–32)
CO2 SERPL-SCNC: 29 MMOL/L (ref 21–32)
CO2 SERPL-SCNC: 31 MMOL/L (ref 21–32)
CREAT SERPL-MCNC: 1.48 MG/DL (ref 0.6–1.3)
CREAT SERPL-MCNC: 1.61 MG/DL (ref 0.6–1.3)
CREAT SERPL-MCNC: 1.97 MG/DL (ref 0.6–1.3)
GFR SERPL CREATININE-BSD FRML MDRD: 36 ML/MIN/1.73SQ M
GFR SERPL CREATININE-BSD FRML MDRD: 47 ML/MIN/1.73SQ M
GFR SERPL CREATININE-BSD FRML MDRD: 52 ML/MIN/1.73SQ M
GLUCOSE SERPL-MCNC: 162 MG/DL (ref 65–140)
GLUCOSE SERPL-MCNC: 177 MG/DL (ref 65–140)
GLUCOSE SERPL-MCNC: 211 MG/DL (ref 65–140)
GLUCOSE SERPL-MCNC: 213 MG/DL (ref 65–140)
GLUCOSE SERPL-MCNC: 263 MG/DL (ref 65–140)
GLUCOSE SERPL-MCNC: 281 MG/DL (ref 65–140)
POTASSIUM SERPL-SCNC: 3.3 MMOL/L (ref 3.5–5.3)
POTASSIUM SERPL-SCNC: 4 MMOL/L (ref 3.5–5.3)
POTASSIUM SERPL-SCNC: 4.1 MMOL/L (ref 3.5–5.3)
SODIUM SERPL-SCNC: 134 MMOL/L (ref 135–147)
SODIUM SERPL-SCNC: 135 MMOL/L (ref 135–147)
SODIUM SERPL-SCNC: 139 MMOL/L (ref 135–147)

## 2023-12-25 PROCEDURE — 99232 SBSQ HOSP IP/OBS MODERATE 35: CPT | Performed by: INTERNAL MEDICINE

## 2023-12-25 PROCEDURE — 99232 SBSQ HOSP IP/OBS MODERATE 35: CPT | Performed by: STUDENT IN AN ORGANIZED HEALTH CARE EDUCATION/TRAINING PROGRAM

## 2023-12-25 PROCEDURE — 82948 REAGENT STRIP/BLOOD GLUCOSE: CPT

## 2023-12-25 PROCEDURE — 80048 BASIC METABOLIC PNL TOTAL CA: CPT | Performed by: INTERNAL MEDICINE

## 2023-12-25 RX ORDER — POTASSIUM CHLORIDE 20 MEQ/1
40 TABLET, EXTENDED RELEASE ORAL ONCE
Status: COMPLETED | OUTPATIENT
Start: 2023-12-25 | End: 2023-12-25

## 2023-12-25 RX ORDER — BACLOFEN 10 MG/1
5 TABLET ORAL ONCE
Status: COMPLETED | OUTPATIENT
Start: 2023-12-25 | End: 2023-12-25

## 2023-12-25 RX ORDER — MAGNESIUM SULFATE HEPTAHYDRATE 40 MG/ML
2 INJECTION, SOLUTION INTRAVENOUS ONCE
Status: COMPLETED | OUTPATIENT
Start: 2023-12-25 | End: 2023-12-25

## 2023-12-25 RX ORDER — INSULIN GLARGINE 100 [IU]/ML
6 INJECTION, SOLUTION SUBCUTANEOUS
Status: DISCONTINUED | OUTPATIENT
Start: 2023-12-25 | End: 2023-12-29 | Stop reason: HOSPADM

## 2023-12-25 RX ADMIN — METOPROLOL SUCCINATE 50 MG: 50 TABLET, EXTENDED RELEASE ORAL at 08:24

## 2023-12-25 RX ADMIN — INSULIN LISPRO 1 UNITS: 100 INJECTION, SOLUTION INTRAVENOUS; SUBCUTANEOUS at 08:23

## 2023-12-25 RX ADMIN — UMECLIDINIUM 1 PUFF: 62.5 AEROSOL, POWDER ORAL at 08:23

## 2023-12-25 RX ADMIN — SPIRONOLACTONE 25 MG: 25 TABLET, FILM COATED ORAL at 08:24

## 2023-12-25 RX ADMIN — BUDESONIDE AND FORMOTEROL FUMARATE DIHYDRATE 2 PUFF: 160; 4.5 AEROSOL RESPIRATORY (INHALATION) at 17:24

## 2023-12-25 RX ADMIN — APIXABAN 5 MG: 5 TABLET, FILM COATED ORAL at 17:24

## 2023-12-25 RX ADMIN — EMPAGLIFLOZIN 10 MG: 10 TABLET, FILM COATED ORAL at 08:24

## 2023-12-25 RX ADMIN — MAGNESIUM SULFATE HEPTAHYDRATE 2 G: 40 INJECTION, SOLUTION INTRAVENOUS at 15:27

## 2023-12-25 RX ADMIN — PREGABALIN 50 MG: 50 CAPSULE ORAL at 22:00

## 2023-12-25 RX ADMIN — INSULIN GLARGINE 6 UNITS: 100 INJECTION, SOLUTION SUBCUTANEOUS at 22:00

## 2023-12-25 RX ADMIN — BUDESONIDE AND FORMOTEROL FUMARATE DIHYDRATE 2 PUFF: 160; 4.5 AEROSOL RESPIRATORY (INHALATION) at 08:23

## 2023-12-25 RX ADMIN — APIXABAN 5 MG: 5 TABLET, FILM COATED ORAL at 08:24

## 2023-12-25 RX ADMIN — ATORVASTATIN CALCIUM 10 MG: 10 TABLET, FILM COATED ORAL at 08:24

## 2023-12-25 RX ADMIN — BACLOFEN 5 MG: 10 TABLET ORAL at 13:35

## 2023-12-25 RX ADMIN — POTASSIUM CHLORIDE 40 MEQ: 1500 TABLET, EXTENDED RELEASE ORAL at 18:38

## 2023-12-25 RX ADMIN — PANTOPRAZOLE SODIUM 40 MG: 40 TABLET, DELAYED RELEASE ORAL at 08:24

## 2023-12-25 RX ADMIN — POTASSIUM CHLORIDE 40 MEQ: 1500 TABLET, EXTENDED RELEASE ORAL at 08:24

## 2023-12-25 RX ADMIN — Medication 0.5 MG/HR: at 05:36

## 2023-12-25 RX ADMIN — POTASSIUM CHLORIDE 40 MEQ: 1500 TABLET, EXTENDED RELEASE ORAL at 17:24

## 2023-12-25 RX ADMIN — POTASSIUM CHLORIDE 40 MEQ: 1500 TABLET, EXTENDED RELEASE ORAL at 13:35

## 2023-12-25 RX ADMIN — INSULIN LISPRO 2 UNITS: 100 INJECTION, SOLUTION INTRAVENOUS; SUBCUTANEOUS at 17:52

## 2023-12-25 RX ADMIN — INSULIN LISPRO 3 UNITS: 100 INJECTION, SOLUTION INTRAVENOUS; SUBCUTANEOUS at 12:09

## 2023-12-25 NOTE — PLAN OF CARE
Problem: PAIN - ADULT  Goal: Verbalizes/displays adequate comfort level or baseline comfort level  Description: Interventions:  - Encourage patient to monitor pain and request assistance  - Assess pain using appropriate pain scale  - Administer analgesics based on type and severity of pain and evaluate response  - Implement non-pharmacological measures as appropriate and evaluate response  - Consider cultural and social influences on pain and pain management  - Notify physician/advanced practitioner if interventions unsuccessful or patient reports new pain  Outcome: Progressing     Problem: INFECTION - ADULT  Goal: Absence or prevention of progression during hospitalization  Description: INTERVENTIONS:  - Assess and monitor for signs and symptoms of infection  - Monitor lab/diagnostic results  - Monitor all insertion sites, i.e. indwelling lines, tubes, and drains  - Monitor endotracheal if appropriate and nasal secretions for changes in amount and color  - Cobb appropriate cooling/warming therapies per order  - Administer medications as ordered  - Instruct and encourage patient and family to use good hand hygiene technique  - Identify and instruct in appropriate isolation precautions for identified infection/condition  Outcome: Progressing  Goal: Absence of fever/infection during neutropenic period  Description: INTERVENTIONS:  - Monitor WBC    Outcome: Progressing     Problem: SAFETY ADULT  Goal: Patient will remain free of falls  Description: INTERVENTIONS:  - Educate patient/family on patient safety including physical limitations  - Instruct patient to call for assistance with activity   - Consult OT/PT to assist with strengthening/mobility   - Keep Call bell within reach  - Keep bed low and locked with side rails adjusted as appropriate  - Keep care items and personal belongings within reach  - Initiate and maintain comfort rounds  - Make Fall Risk Sign visible to staff  - Offer Toileting every  Hours,  in advance of need  - Initiate/Maintain alarm  - Obtain necessary fall risk management equipment:   - Apply yellow socks and bracelet for high fall risk patients  - Consider moving patient to room near nurses station  Outcome: Progressing  Goal: Maintain or return to baseline ADL function  Description: INTERVENTIONS:  -  Assess patient's ability to carry out ADLs; assess patient's baseline for ADL function and identify physical deficits which impact ability to perform ADLs (bathing, care of mouth/teeth, toileting, grooming, dressing, etc.)  - Assess/evaluate cause of self-care deficits   - Assess range of motion  - Assess patient's mobility; develop plan if impaired  - Assess patient's need for assistive devices and provide as appropriate  - Encourage maximum independence but intervene and supervise when necessary  - Involve family in performance of ADLs  - Assess for home care needs following discharge   - Consider OT consult to assist with ADL evaluation and planning for discharge  - Provide patient education as appropriate  Outcome: Progressing  Goal: Maintains/Returns to pre admission functional level  Description: INTERVENTIONS:  - Perform AM-PAC 6 Click Basic Mobility/ Daily Activity assessment daily.  - Set and communicate daily mobility goal to care team and patient/family/caregiver.   - Collaborate with rehabilitation services on mobility goals if consulted  - Perform Range of Motion  times a day.  - Reposition patient every  hours.  - Dangle patient  times a day  - Stand patient  times a day  - Ambulate patient  times a day  - Out of bed to chair  times a day   - Out of bed for meals times a day  - Out of bed for toileting  - Record patient progress and toleration of activity level   Outcome: Progressing     Problem: DISCHARGE PLANNING  Goal: Discharge to home or other facility with appropriate resources  Description: INTERVENTIONS:  - Identify barriers to discharge w/patient and caregiver  - Arrange for  needed discharge resources and transportation as appropriate  - Identify discharge learning needs (meds, wound care, etc.)  - Arrange for interpretive services to assist at discharge as needed  - Refer to Case Management Department for coordinating discharge planning if the patient needs post-hospital services based on physician/advanced practitioner order or complex needs related to functional status, cognitive ability, or social support system  Outcome: Progressing     Problem: Knowledge Deficit  Goal: Patient/family/caregiver demonstrates understanding of disease process, treatment plan, medications, and discharge instructions  Description: Complete learning assessment and assess knowledge base.  Interventions:  - Provide teaching at level of understanding  - Provide teaching via preferred learning methods  Outcome: Progressing     Problem: CARDIOVASCULAR - ADULT  Goal: Maintains optimal cardiac output and hemodynamic stability  Description: INTERVENTIONS:  - Monitor I/O, vital signs and rhythm  - Monitor for S/S and trends of decreased cardiac output  - Administer and titrate ordered vasoactive medications to optimize hemodynamic stability  - Assess quality of pulses, skin color and temperature  - Assess for signs of decreased coronary artery perfusion  - Instruct patient to report change in severity of symptoms  Outcome: Progressing  Goal: Absence of cardiac dysrhythmias or at baseline rhythm  Description: INTERVENTIONS:  - Continuous cardiac monitoring, vital signs, obtain 12 lead EKG if ordered  - Administer antiarrhythmic and heart rate control medications as ordered  - Monitor electrolytes and administer replacement therapy as ordered  Outcome: Progressing     Problem: RESPIRATORY - ADULT  Goal: Achieves optimal ventilation and oxygenation  Description: INTERVENTIONS:  - Assess for changes in respiratory status  - Assess for changes in mentation and behavior  - Position to facilitate oxygenation and  minimize respiratory effort  - Oxygen administered by appropriate delivery if ordered  - Initiate smoking cessation education as indicated  - Encourage broncho-pulmonary hygiene including cough, deep breathe, Incentive Spirometry  - Assess the need for suctioning and aspirate as needed  - Assess and instruct to report SOB or any respiratory difficulty  - Respiratory Therapy support as indicated  Outcome: Progressing

## 2023-12-25 NOTE — PROGRESS NOTES
[facility]  Progress Note  Name: Mesfin Cano I  MRN: [id number]  Unit/Bed#: -01 I Date of Admission: 12/23/2023   Date of Service: 12/25/2023 I Hospital Day: 2    Assessment/Plan   Diabetic neuropathy (HCC)  Assessment & Plan  PDMP reviewed, continue Lyrica.  (P) [id number]      Paroxysmal atrial fibrillation (HCC)  Assessment & Plan  Continue metoprolol succinate 50 mg daily.  Continue Eliquis for anticoagulation.    Severe persistent asthma  Assessment & Plan  Not currently in exacerbation.  Continue albuterol, Symbicort and Spiriva equivalent.    Hyperlipidemia  Assessment & Plan  Continue home atorvastatin.    Type 2 diabetes mellitus with hyperglycemia, without long-term current use of insulin ([facility])  Assessment & Plan  Lab Results   Component Value Date    HGBA1C 7.1 (A) 08/09/2023       Recent Labs     12/24/23  1631 12/24/23  2041 12/25/23  0800 12/25/23  1200   POCGLU 180* 255* 177* 263*         Blood Sugar Average: Last 72 hrs:  (P) [id number]  At home, on empagliflozin and Sitagliptin.  Hold all oral meds including empagliflozin  Hold Sitagliptin.  Correctional insulin with meals.  Start low-dose Lantus for improved blood sugar control and adjust as needed    * Acute on chronic heart failure with preserved ejection fraction (HFpEF) ([facility])  Assessment & Plan  Wt Readings from Last 3 Encounters:   12/25/23 (!) 154 kg (339 lb 12.8 oz)   12/15/23 (!) 157 kg (346 lb)   12/13/23 (!) 157 kg (346 lb 3.2 oz)     Acute on chronic heart failure with preserved EF. Stage C, NYHA III.  Etiology A-fib, hypertension, obesity  Dry weight 335 pounds, weight today 349 lb.  proBNP 245, previously in the 90-160s.  Echo 4/2023 EF of 50%, grade 2 diastolic dysfunction.  Diuretic regimen Bumex 6 mg twice daily, spironolactone 25 mg daily, metolazone 2.5 mg as needed and Jardiance 10 mg daily.  Acute on chronic exacerbation, not responding to outpatient  treatment.  He was started on Bumex drip by the ED, will continue.  Continue spironolactone, Jardiance.  Continue potassium supplementation with diuretics.  Monitor BMP bid  Strict ins and outs, daily standing weights, low-sodium diet and 1800 mL fluid restriction.  Heart failure recommendations appreciated  Patient continued on Bumex drip, will increase potassium supplementation, supplement magnesium as well, twice daily BMP  Patient's weight down to 339 pounds with good urine output, continued on Bumex as per heart failure                 VTE Pharmacologic Prophylaxis: VTE Score: 4 High Risk (Score >/= 5) - Pharmacological DVT Prophylaxis Ordered: apixaban (Eliquis). Sequential Compression Devices Ordered.    Mobility:   JH-HLM Achieved: 6: Walk 10 steps or more  HLM Goal achieved. Continue to encourage appropriate mobility.    Patient Centered Rounds: I performed bedside rounds with nursing staff today.   Discussions with Specialists or Other Care Team Provider: n/a    Education and Discussions with Family / Patient: Patient declined call to .     Total Time Spent on Date of Encounter in care of patient: 35 mins. This time was spent on one or more of the following: performing physical exam; counseling and coordination of care; obtaining or reviewing history; documenting in the medical record; reviewing/ordering tests, medications or procedures; communicating with other healthcare professionals and discussing with patient's family/caregivers.    Current Length of Stay: 2 day(s)  Current Patient Status: Inpatient   Certification Statement: The patient will continue to require additional inpatient hospital stay due to pending diuretics  Discharge Plan: Anticipate discharge in 48-72 hrs to discharge location to be determined pending rehab evaluations.    Code Status: Level 1 - Full Code    Subjective:   Patient reported some cramping in his left hand which is improved after magnesium and  baclofen    Objective:     Vitals:   Temp (24hrs), Av.5 °F (36.9 °C), Min:98 °F (36.7 °C), Max:99 °F (37.2 °C)    Temp:  [98 °F (36.7 °C)-99 °F (37.2 °C)] 98.4 °F (36.9 °C)  HR:  [85-94] 94  Resp:  [18] 18  BP: (128-146)/(81-86) 128/82  SpO2:  [95 %-98 %] 98 %  Body mass index is 44.83 kg/m².     Input and Output Summary (last 24 hours):     Intake/Output Summary (Last 24 hours) at 2023 1540  Last data filed at 2023 1346  Gross per 24 hour   Intake 560 ml   Output 4035 ml   Net -3475 ml       Physical Exam:   Physical Exam  Vitals and nursing note reviewed.   Constitutional:       General: He is not in acute distress.     Appearance: He is well-developed. He is not toxic-appearing or diaphoretic.   HENT:      Head: Normocephalic and atraumatic.   Eyes:      General: No scleral icterus.     Conjunctiva/sclera: Conjunctivae normal.   Cardiovascular:      Rate and Rhythm: Normal rate and regular rhythm.      Heart sounds: No murmur heard.     No friction rub. No gallop.   Pulmonary:      Effort: Pulmonary effort is normal. No respiratory distress.      Breath sounds: No stridor. Rales present. No wheezing or rhonchi.   Chest:      Chest wall: No tenderness.   Abdominal:      General: There is distension.      Palpations: Abdomen is soft. There is no mass.      Tenderness: There is no abdominal tenderness. There is no guarding or rebound.      Hernia: No hernia is present.   Musculoskeletal:         General: No swelling or tenderness.      Cervical back: Neck supple.      Right lower leg: Edema present.      Left lower leg: Edema present.   Skin:     General: Skin is warm and dry.      Capillary Refill: Capillary refill takes less than 2 seconds.   Neurological:      Mental Status: He is alert and oriented to person, place, and time.   Psychiatric:         Mood and Affect: Mood normal.          Additional Data:     Labs:  Results from last 7 days   Lab Units 23  0443   WBC Thousand/uL 5.83    HEMOGLOBIN g/dL 9.8*   HEMATOCRIT % 31.3*   PLATELETS Thousands/uL 235   NEUTROS PCT % 67   LYMPHS PCT % 15   MONOS PCT % 9   EOS PCT % 6     Results from last 7 days   Lab Units 12/25/23  0439 12/24/23  0443 12/23/23  1515   SODIUM mmol/L 139   < > 137   POTASSIUM mmol/L 3.3*   < > 3.7   CHLORIDE mmol/L 97   < > 101   CO2 mmol/L 31   < > 22   BUN mg/dL 12   < > 11   CREATININE mg/dL 1.48*   < > 1.13   ANION GAP mmol/L 11   < > 14   CALCIUM mg/dL 7.6*   < > 8.0*   ALBUMIN g/dL  --   --  3.6   TOTAL BILIRUBIN mg/dL  --   --  0.68   ALK PHOS U/L  --   --  105*   ALT U/L  --   --  9   AST U/L  --   --  18   GLUCOSE RANDOM mg/dL 162*   < > 174*    < > = values in this interval not displayed.         Results from last 7 days   Lab Units 12/25/23  1200 12/25/23  0800 12/24/23  2041 12/24/23  1631 12/24/23  1200 12/24/23  0759 12/23/23  2141   POC GLUCOSE mg/dl 263* 177* 255* 180* 202* 158* 196*               Lines/Drains:  Invasive Devices       Peripheral Intravenous Line  Duration             Peripheral IV 12/14/23 Left;Proximal;Ventral (anterior) Forearm 11 days    Peripheral IV 12/23/23 Distal;Left;Upper;Ventral (anterior) Arm 2 days                      Telemetry:  Telemetry Orders (From admission, onward)               24 Hour Telemetry Monitoring  Continuous x 24 Hours (Telem)        Question:  Reason for 24 Hour Telemetry  Answer:  Decompensated CHF- and any one of the following: continuous diuretic infusion or total diuretic dose >200 mg daily, associated electrolyte derangement (I.e. K < 3.0), ionotropic drip (continuous infusion), hx of ventricular arrhythmia, or new EF < 35%                                Imaging: No pertinent imaging reviewed.    Recent Cultures (last 7 days):         Last 24 Hours Medication List:   Current Facility-Administered Medications   Medication Dose Route Frequency Provider Last Rate    albuterol  2 puff Inhalation Q4H PRN William Stahl MD      apixaban  5 mg Oral BID William Stahl MD       atorvastatin  10 mg Oral Daily William Stahl MD      budesonide-formoterol  2 puff Inhalation BID William Stahl MD      bumetanide (BUMEX) 12.5 mg infusion 50 mL  0.5 mg/hr Intravenous Continuous William Stahl MD 0.5 mg/hr (12/25/23 0536)    insulin glargine  6 Units Subcutaneous HS Faheem Banda DO      insulin lispro  1-6 Units Subcutaneous TID AC William Stahl MD      magnesium sulfate  2 g Intravenous Once Faheem Banda DO 2 g (12/25/23 1527)    metoprolol succinate  50 mg Oral Daily William Stahl MD      pantoprazole  40 mg Oral Daily William Stahl MD      potassium chloride  40 mEq Oral BID William Stahl MD      potassium chloride  40 mEq Oral Once Faheem Banda DO      pregabalin  50 mg Oral HS William Stahl MD      spironolactone  25 mg Oral Daily William Stahl MD      umeclidinium  1 puff Inhalation Daily William Stahl MD          Today, Patient Was Seen By: Faheem Banda DO    **Please Note: This note may have been constructed using a voice recognition system.**

## 2023-12-25 NOTE — PROGRESS NOTES
Advanced Heart Failure/Pulmonary Hypertension Service Note - Mesfin Cano 58 y.o. male MRN: [id number]    Unit/Bed#: -01 Encounter: [id number]      Assessment:  58 y.o. male PMH and acute problems addressed this encounter at end of note and in epic chart p/w weight gain, sob, diagnosed with ADHF.      I reviewed all pertinent labs/imaging/data including but not limited to:    Temp:  [98 °F (36.7 °C)-99 °F (37.2 °C)] 98 °F (36.7 °C)  HR:  [85-92] 91  Resp:  [17-18] 18  BP: (127-146)/(80-86) 141/85     Weight (last 2 days)       Date/Time Weight    12/25/23 0300 154 (339.8)    12/24/23 0600 157 (345.4)    12/23/23 2209 158 (347.8)             Intake/Output Summary (Last 24 hours) at 12/25/2023 0847  Last data filed at 12/25/2023 0401  Gross per 24 hour   Intake 540 ml   Output 3550 ml   Net -3010 ml       Results from last 7 days   Lab Units 12/25/23  0439 12/24/23  0443 12/23/23  1515   CREATININE mg/dL 1.48* 1.18 1.13     Lab Results   Component Value Date    K 3.3 (L) 12/25/2023     Lab Results   Component Value Date    HGBA1C 7.1 (A) 08/09/2023     Lab Results   Component Value Date    UTP0RIIYHGDD 1.270 09/01/2022     Lab Results   Component Value Date    LDLCALC 86 10/06/2023     Lab Results   Component Value Date     (H) 12/23/2023      Lab Results   Component Value Date    NTBNP 697 (H) 04/10/2023                 Drips:  bumetanide (BUMEX) 12.5 mg infusion 50 mL, 0.5 mg/hr, Last Rate: 0.5 mg/hr (12/25/23 0536)         Plan:  Warm, volume up - making progress on bumex gtt  Cr near BL    Replete K    Bid lytes while on bumex gtt    Cw current regimen    Query cardiomems data trend when able    Studies:  I have reviewed all pertinent patient data/labs/imaging where available, including but not limited to the below studies. Selected results may be displayed here but comprehensive listing is omitted for note clarity and can be found in the epic  "chart.    ECG.    Echo.    Stress.    Cath.    Subjective and Interval Events:   Patient seen and examined.  No new complaints.  Feeling better    ROS:  10 point ROS negative except as specified above.    Tele: reviewed    Objective:     Physical Exam:  /85 (BP Location: Right arm)   Pulse 91   Temp 98 °F (36.7 °C) (Oral)   Resp 18   Ht 6' 1\" (1.854 m)   Wt (!) 154 kg (339 lb 12.8 oz)   SpO2 95%   BMI 44.83 kg/m²   Ranges:  Temp:  [98 °F (36.7 °C)-99 °F (37.2 °C)] 98 °F (36.7 °C)  HR:  [85-92] 91  Resp:  [17-18] 18  BP: (127-146)/(80-86) 141/85    Weight (last 2 days)       Date/Time Weight    12/25/23 0300 154 (339.8)    12/24/23 0600 157 (345.4)    12/23/23 2209 158 (347.8)             Intake/Output Summary (Last 24 hours) at 12/25/2023 0847  Last data filed at 12/25/2023 0401  Gross per 24 hour   Intake 540 ml   Output 3550 ml   Net -3010 ml     Constitutional: NAD, non toxic  Ears/nose/mouth/throat: atraumatic  CV: RRR, +JVD  Resp: Decreased breath sounds BL  GI: Soft, NTND  MSK: no swollen joints in exposed areas  Extr: +1 LE edema, warm LE  Pysche: Normal affect  Neuro: appropriate in conversation  Skin: dry and intact in exposed areas    Data:   Results from last 7 days   Lab Units 12/24/23  0443 12/23/23  1515   WBC Thousand/uL 5.83 6.59   HEMOGLOBIN g/dL 9.8* 10.5*   HEMATOCRIT % 31.3* 34.1*   PLATELETS Thousands/uL 235 243   NEUTROS PCT % 67 66   MONOS PCT % 9 9   EOS PCT % 6 6      Results from last 7 days   Lab Units 12/25/23  0439 12/24/23  0443 12/23/23  1515   SODIUM mmol/L 139 136 137   POTASSIUM mmol/L 3.3* 3.5 3.7   CHLORIDE mmol/L 97 96 101   CO2 mmol/L 31 27 22   ANION GAP mmol/L 11 13 14   BUN mg/dL 12 12 11   CREATININE mg/dL 1.48* 1.18 1.13   CALCIUM mg/dL 7.6* 7.9* 8.0*   GLUCOSE RANDOM mg/dL 162* 141* 174*   ALT U/L  --   --  9   AST U/L  --   --  18   ALK PHOS U/L  --   --  105*   ALBUMIN g/dL  --   --  3.6   TOTAL BILIRUBIN mg/dL  --   --  0.68      No results found for: " "\"LDH\"    bumetanide (BUMEX) 12.5 mg infusion 50 mL, 0.5 mg/hr, Last Rate: 0.5 mg/hr (12/25/23 0536)      Current Facility-Administered Medications   Medication Dose Route Frequency Provider Last Rate    albuterol  2 puff Inhalation Q4H PRN William Stahl MD      apixaban  5 mg Oral BID William Stahl MD      atorvastatin  10 mg Oral Daily William Stahl MD      budesonide-formoterol  2 puff Inhalation BID William Stahl MD      bumetanide (BUMEX) 12.5 mg infusion 50 mL  0.5 mg/hr Intravenous Continuous William Stahl MD 0.5 mg/hr (12/25/23 0536)    Empagliflozin  10 mg Oral Daily William Stahl MD      insulin lispro  1-6 Units Subcutaneous TID AC William Stahl MD      metoprolol succinate  50 mg Oral Daily William Stahl MD      pantoprazole  40 mg Oral Daily William Stahl MD      potassium chloride  40 mEq Oral BID William Stahl MD      pregabalin  50 mg Oral HS William Stahl MD      spironolactone  25 mg Oral Daily William Stahl MD      umeclidinium  1 puff Inhalation Daily William Stahl MD         Past Medical History:   Diagnosis Date    Acute gastritis without hemorrhage     last assessed 3/24/17    Asthma     Atrial fibrillation (HCC)     Bilateral leg edema 02/19/2020    CHF (congestive heart failure) (HCC)     Coronary artery disease     Daytime sleepiness 07/17/2019    Diabetes mellitus (HCC)     Dyspnea on exertion 10/11/2021    Edema of both feet 01/23/2020    Gastric bypass status for obesity     Hyperlipidemia     Hypertension     Hypokalemia 11/14/2021    Impaired fasting glucose     last assessed 5/10/17    Knee sprain, bilateral 06/14/2018    Leg cramps 06/16/2022    Obesity     Viral gastroenteritis     last assessed 11/4/16     Patient Active Problem List   Diagnosis    Irritable bowel syndrome with diarrhea    Primary hypertension    Type 2 diabetes mellitus with hyperglycemia, without long-term current use of insulin (HCC)    Primary osteoarthritis of both knees    Hyperlipidemia    Morbid obesity (HCC)    Vitamin B12 " deficiency    Vitamin D deficiency    VICKY (obstructive sleep apnea)    Hyponatremia    Severe persistent asthma    Acute on chronic heart failure with preserved ejection fraction (HFpEF) (HCC)    Paroxysmal atrial fibrillation (HCC)    HNP (herniated nucleus pulposus), lumbar    Foraminal stenosis of lumbar region    Primary osteoarthritis of right knee    CKD (chronic kidney disease) stage 3 (HCC)    Diabetic polyneuropathy associated with type 2 diabetes mellitus (HCC)    Chronic diastolic CHF (HCC)    Loose stools    Presence of CardioMEMS HF system    Nasal congestion    Anemia of chronic disease    Eosinophilia    Chronic renal disease, stage IV (HCC)    Diabetic neuropathy (HCC)       Counseling / Coordination of Care:  Total floor / unit time spent today 30 minutes.  Greater than 50% of total time was spent with the patient and / or family counseling and / or coordination of care.  A description of the counseling / coordination of care: we discussed diagnoses, recent as well as older studies, labs, and all changes in cardiac treatment plan. All patient questions were answered.     Thank you for the opportunity to participate in the care of this patient.    Yoel Darden MD  Attending Physician  Advanced Heart Failure and Transplant Cardiology  [facility]

## 2023-12-25 NOTE — ASSESSMENT & PLAN NOTE
Wt Readings from Last 3 Encounters:   12/25/23 (!) 154 kg (339 lb 12.8 oz)   12/15/23 (!) 157 kg (346 lb)   12/13/23 (!) 157 kg (346 lb 3.2 oz)     Acute on chronic heart failure with preserved EF. Stage C, NYHA III.  Etiology A-fib, hypertension, obesity  Dry weight 335 pounds, weight today 349 lb.  proBNP 245, previously in the 90-160s.  Echo 4/2023 EF of 50%, grade 2 diastolic dysfunction.  Diuretic regimen Bumex 6 mg twice daily, spironolactone 25 mg daily, metolazone 2.5 mg as needed and Jardiance 10 mg daily.  Acute on chronic exacerbation, not responding to outpatient treatment.  He was started on Bumex drip by the ED, will continue.  Continue spironolactone, Jardiance.  Continue potassium supplementation with diuretics.  Monitor BMP bid  Strict ins and outs, daily standing weights, low-sodium diet and 1800 mL fluid restriction.  Heart failure recommendations appreciated  Patient continued on Bumex drip, will increase potassium supplementation, supplement magnesium as well, twice daily BMP  Patient's weight down to 339 pounds with good urine output, continued on Bumex as per heart failure

## 2023-12-25 NOTE — ASSESSMENT & PLAN NOTE
Lab Results   Component Value Date    HGBA1C 7.1 (A) 08/09/2023       Recent Labs     12/24/23  1631 12/24/23  2041 12/25/23  0800 12/25/23  1200   POCGLU 180* 255* 177* 263*         Blood Sugar Average: Last 72 hrs:  (P) 204.9208274088415148  At home, on empagliflozin and Sitagliptin.  Hold all oral meds including empagliflozin  Hold Sitagliptin.  Correctional insulin with meals.  Start low-dose Lantus for improved blood sugar control and adjust as needed

## 2023-12-26 LAB
ALBUMIN SERPL ELPH-MCNC: 3.18 G/DL (ref 3.2–5.1)
ALBUMIN SERPL ELPH-MCNC: 53.9 % (ref 48–70)
ALPHA1 GLOB SERPL ELPH-MCNC: 0.4 G/DL (ref 0.15–0.47)
ALPHA1 GLOB SERPL ELPH-MCNC: 6.7 % (ref 1.8–7)
ALPHA2 GLOB SERPL ELPH-MCNC: 0.9 G/DL (ref 0.42–1.04)
ALPHA2 GLOB SERPL ELPH-MCNC: 15.3 % (ref 5.9–14.9)
ANION GAP SERPL CALCULATED.3IONS-SCNC: 12 MMOL/L
ANION GAP SERPL CALCULATED.3IONS-SCNC: 12 MMOL/L
BASOPHILS # BLD AUTO: 0.04 THOUSANDS/ÂΜL (ref 0–0.1)
BASOPHILS NFR BLD AUTO: 1 % (ref 0–1)
BETA GLOB ABNORMAL SERPL ELPH-MCNC: 0.48 G/DL (ref 0.31–0.57)
BETA1 GLOB SERPL ELPH-MCNC: 8.2 % (ref 4.7–7.7)
BETA2 GLOB SERPL ELPH-MCNC: 6.1 % (ref 3.1–7.9)
BETA2+GAMMA GLOB SERPL ELPH-MCNC: 0.36 G/DL (ref 0.2–0.58)
BUN SERPL-MCNC: 15 MG/DL (ref 5–25)
BUN SERPL-MCNC: 18 MG/DL (ref 5–25)
CALCIUM SERPL-MCNC: 7.3 MG/DL (ref 8.4–10.2)
CALCIUM SERPL-MCNC: 7.5 MG/DL (ref 8.4–10.2)
CHLORIDE SERPL-SCNC: 95 MMOL/L (ref 96–108)
CHLORIDE SERPL-SCNC: 96 MMOL/L (ref 96–108)
CO2 SERPL-SCNC: 27 MMOL/L (ref 21–32)
CO2 SERPL-SCNC: 27 MMOL/L (ref 21–32)
CREAT SERPL-MCNC: 1.8 MG/DL (ref 0.6–1.3)
CREAT SERPL-MCNC: 2.08 MG/DL (ref 0.6–1.3)
EOSINOPHIL # BLD AUTO: 0.42 THOUSAND/ÂΜL (ref 0–0.61)
EOSINOPHIL NFR BLD AUTO: 7 % (ref 0–6)
ERYTHROCYTE [DISTWIDTH] IN BLOOD BY AUTOMATED COUNT: 18.1 % (ref 11.6–15.1)
GAMMA GLOB ABNORMAL SERPL ELPH-MCNC: 0.58 G/DL (ref 0.4–1.66)
GAMMA GLOB SERPL ELPH-MCNC: 9.8 % (ref 6.9–22.3)
GFR SERPL CREATININE-BSD FRML MDRD: 34 ML/MIN/1.73SQ M
GFR SERPL CREATININE-BSD FRML MDRD: 41 ML/MIN/1.73SQ M
GLUCOSE SERPL-MCNC: 217 MG/DL (ref 65–140)
GLUCOSE SERPL-MCNC: 279 MG/DL (ref 65–140)
GLUCOSE SERPL-MCNC: 280 MG/DL (ref 65–140)
GLUCOSE SERPL-MCNC: 289 MG/DL (ref 65–140)
GLUCOSE SERPL-MCNC: 362 MG/DL (ref 65–140)
HCT VFR BLD AUTO: 31 % (ref 36.5–49.3)
HGB BLD-MCNC: 9.3 G/DL (ref 12–17)
IGG/ALB SER: 1.17 {RATIO} (ref 1.1–1.8)
IMM GRANULOCYTES # BLD AUTO: 0.12 THOUSAND/UL (ref 0–0.2)
IMM GRANULOCYTES NFR BLD AUTO: 2 % (ref 0–2)
LYMPHOCYTES # BLD AUTO: 1 THOUSANDS/ÂΜL (ref 0.6–4.47)
LYMPHOCYTES NFR BLD AUTO: 17 % (ref 14–44)
MAGNESIUM SERPL-MCNC: 2.5 MG/DL (ref 1.9–2.7)
MCH RBC QN AUTO: 26.1 PG (ref 26.8–34.3)
MCHC RBC AUTO-ENTMCNC: 30 G/DL (ref 31.4–37.4)
MCV RBC AUTO: 87 FL (ref 82–98)
MONOCYTES # BLD AUTO: 0.6 THOUSAND/ÂΜL (ref 0.17–1.22)
MONOCYTES NFR BLD AUTO: 10 % (ref 4–12)
NEUTROPHILS # BLD AUTO: 3.74 THOUSANDS/ÂΜL (ref 1.85–7.62)
NEUTS SEG NFR BLD AUTO: 63 % (ref 43–75)
NRBC BLD AUTO-RTO: 0 /100 WBCS
PLATELET # BLD AUTO: 253 THOUSANDS/UL (ref 149–390)
PMV BLD AUTO: 10.3 FL (ref 8.9–12.7)
POTASSIUM SERPL-SCNC: 4.2 MMOL/L (ref 3.5–5.3)
POTASSIUM SERPL-SCNC: 4.6 MMOL/L (ref 3.5–5.3)
PROT PATTERN SERPL ELPH-IMP: ABNORMAL
PROT SERPL-MCNC: 5.9 G/DL (ref 6.4–8.2)
RBC # BLD AUTO: 3.56 MILLION/UL (ref 3.88–5.62)
SODIUM SERPL-SCNC: 134 MMOL/L (ref 135–147)
SODIUM SERPL-SCNC: 135 MMOL/L (ref 135–147)
WBC # BLD AUTO: 5.92 THOUSAND/UL (ref 4.31–10.16)

## 2023-12-26 PROCEDURE — 83735 ASSAY OF MAGNESIUM: CPT | Performed by: INTERNAL MEDICINE

## 2023-12-26 PROCEDURE — 85025 COMPLETE CBC W/AUTO DIFF WBC: CPT | Performed by: INTERNAL MEDICINE

## 2023-12-26 PROCEDURE — 82948 REAGENT STRIP/BLOOD GLUCOSE: CPT

## 2023-12-26 PROCEDURE — 80048 BASIC METABOLIC PNL TOTAL CA: CPT | Performed by: INTERNAL MEDICINE

## 2023-12-26 PROCEDURE — NC001 PR NO CHARGE

## 2023-12-26 PROCEDURE — 99233 SBSQ HOSP IP/OBS HIGH 50: CPT | Performed by: INTERNAL MEDICINE

## 2023-12-26 PROCEDURE — 84165 PROTEIN E-PHORESIS SERUM: CPT | Performed by: STUDENT IN AN ORGANIZED HEALTH CARE EDUCATION/TRAINING PROGRAM

## 2023-12-26 RX ORDER — SACUBITRIL AND VALSARTAN 24; 26 MG/1; MG/1
1 TABLET, FILM COATED ORAL 2 TIMES DAILY
Qty: 60 TABLET | Refills: 0 | Status: SHIPPED | OUTPATIENT
Start: 2023-12-26 | End: 2023-12-29

## 2023-12-26 RX ADMIN — METOPROLOL SUCCINATE 50 MG: 50 TABLET, EXTENDED RELEASE ORAL at 08:45

## 2023-12-26 RX ADMIN — POTASSIUM CHLORIDE 40 MEQ: 1500 TABLET, EXTENDED RELEASE ORAL at 08:45

## 2023-12-26 RX ADMIN — PREGABALIN 50 MG: 50 CAPSULE ORAL at 22:24

## 2023-12-26 RX ADMIN — INSULIN LISPRO 3 UNITS: 100 INJECTION, SOLUTION INTRAVENOUS; SUBCUTANEOUS at 13:03

## 2023-12-26 RX ADMIN — BUDESONIDE AND FORMOTEROL FUMARATE DIHYDRATE 2 PUFF: 160; 4.5 AEROSOL RESPIRATORY (INHALATION) at 17:23

## 2023-12-26 RX ADMIN — INSULIN LISPRO 3 UNITS: 100 INJECTION, SOLUTION INTRAVENOUS; SUBCUTANEOUS at 17:23

## 2023-12-26 RX ADMIN — EMPAGLIFLOZIN 10 MG: 10 TABLET, FILM COATED ORAL at 15:08

## 2023-12-26 RX ADMIN — SPIRONOLACTONE 25 MG: 25 TABLET, FILM COATED ORAL at 08:45

## 2023-12-26 RX ADMIN — INSULIN GLARGINE 6 UNITS: 100 INJECTION, SOLUTION SUBCUTANEOUS at 22:24

## 2023-12-26 RX ADMIN — APIXABAN 5 MG: 5 TABLET, FILM COATED ORAL at 17:23

## 2023-12-26 RX ADMIN — UMECLIDINIUM 1 PUFF: 62.5 AEROSOL, POWDER ORAL at 09:00

## 2023-12-26 RX ADMIN — POTASSIUM CHLORIDE 40 MEQ: 1500 TABLET, EXTENDED RELEASE ORAL at 17:23

## 2023-12-26 RX ADMIN — INSULIN LISPRO 2 UNITS: 100 INJECTION, SOLUTION INTRAVENOUS; SUBCUTANEOUS at 08:51

## 2023-12-26 RX ADMIN — ATORVASTATIN CALCIUM 10 MG: 10 TABLET, FILM COATED ORAL at 08:45

## 2023-12-26 RX ADMIN — APIXABAN 5 MG: 5 TABLET, FILM COATED ORAL at 08:45

## 2023-12-26 RX ADMIN — SACUBITRIL AND VALSARTAN 1 TABLET: 24; 26 TABLET, FILM COATED ORAL at 22:25

## 2023-12-26 RX ADMIN — BUDESONIDE AND FORMOTEROL FUMARATE DIHYDRATE 2 PUFF: 160; 4.5 AEROSOL RESPIRATORY (INHALATION) at 09:01

## 2023-12-26 RX ADMIN — Medication 0.5 MG/HR: at 00:30

## 2023-12-26 RX ADMIN — PANTOPRAZOLE SODIUM 40 MG: 40 TABLET, DELAYED RELEASE ORAL at 08:45

## 2023-12-26 NOTE — ED ATTENDING ATTESTATION
12/23/2023  I, Miki Sullivan MD, saw and evaluated the patient. I have discussed the patient with the resident/non-physician practitioner and agree with the resident's/non-physician practitioner's findings, Plan of Care, and MDM as documented in the resident's/non-physician practitioner's note, except where noted. All available labs and Radiology studies were reviewed.  I was present for key portions of any procedure(s) performed by the resident/non-physician practitioner and I was immediately available to provide assistance.       At this point I agree with the current assessment done in the Emergency Department.  I have conducted an independent evaluation of this patient a history and physical is as follows:    ED Course     Impression: Exertional dyspnea, peripheral edema and weight gain  Differential diagnosis: Acute CHF exacerbation, volume overload,    Plan to check ECG, BNP, chest x-ray, CBC, CMP,    Will start patient on a Bumex infusion as he reports minimal urinary output despite taking his home doses of diuretics.       Labs reviewed: CMP unremarkable.  CBC markable for mild anemia.  BNP elevated at 245.    Chest x-ray remarkable for no acute cardiopulmonary disease.    Case discussed with medicine service will mid to medicine.    Critical Care Time  Procedures

## 2023-12-26 NOTE — ASSESSMENT & PLAN NOTE
Lab Results   Component Value Date    HGBA1C 7.1 (A) 08/09/2023       Recent Labs     12/25/23  0800 12/25/23  1200 12/25/23  1707 12/26/23  0814   POCGLU 177* 263* 211* 217*         Blood Sugar Average: Last 72 hrs:  (P) 206.6881148345384942  At home, on empagliflozin and Sitagliptin.  Hold all oral meds including empagliflozin  Hold Sitagliptin.  Correctional insulin with meals.  Start low-dose Lantus for improved blood sugar control and adjust as needed

## 2023-12-26 NOTE — PROGRESS NOTES
[facility]  Progress Note  Name: Mesfin Cano I  MRN: [id number]  Unit/Bed#: -01 I Date of Admission: 12/23/2023   Date of Service: 12/26/2023 I Hospital Day: 3    Assessment/Plan   * Acute on chronic heart failure with preserved ejection fraction (HFpEF) ([facility])  Assessment & Plan  Wt Readings from Last 3 Encounters:   12/26/23 (!) 153 kg (337 lb 1.3 oz)   12/15/23 (!) 157 kg (346 lb)   12/13/23 (!) 157 kg (346 lb 3.2 oz)     Acute on chronic heart failure with preserved EF. Stage C, NYHA III.  Etiology A-fib, hypertension, obesity  Dry weight 335 pounds, weight today 349 lb.  proBNP 245, previously in the 90-160s.  Echo 4/2023 EF of 50%, grade 2 diastolic dysfunction.  Diuretic regimen Bumex 6 mg twice daily, spironolactone 25 mg daily, metolazone 2.5 mg as needed and Jardiance 10 mg daily.  Acute on chronic exacerbation, not responding to outpatient treatment.  Bumex gtt as per heart failure  Continue spironolactone, Jardiance.  Continue potassium supplementation with diuretics.  Monitor BMP bid  Strict ins and outs, daily standing weights, low-sodium diet and 1800 mL fluid restriction.  Heart failure recommendations appreciated  Patient continued on Bumex drip, will increase potassium supplementation, supplement magnesium as well, twice daily BMP  Patient's weight down to 339 pounds with good urine output, continued on Bumex as per heart failure    Paroxysmal atrial fibrillation (HCC)  Assessment & Plan  Continue metoprolol succinate 50 mg daily.  Continue Eliquis for anticoagulation.    Severe persistent asthma  Assessment & Plan  Not currently in exacerbation.  Continue albuterol, Symbicort and Spiriva equivalent.    Hyperlipidemia  Assessment & Plan  Continue home atorvastatin.    Type 2 diabetes mellitus with hyperglycemia, without long-term current use of insulin ([facility])  Assessment & Plan  Lab Results   Component Value Date    HGBA1C 7.1 (A) 08/09/2023       Recent  Labs     23  0800 23  1200 23  1707 23  0814   POCGLU 177* 263* 211* 217*         Blood Sugar Average: Last 72 hrs:  (P) 206.4585228356813154  At home, on empagliflozin and Sitagliptin.  Hold all oral meds including empagliflozin  Hold Sitagliptin.  Correctional insulin with meals.  Start low-dose Lantus for improved blood sugar control and adjust as needed             VTE Pharmacologic Prophylaxis:   Pharmacologic: Apixaban (Eliquis)  Mechanical VTE Prophylaxis in Place: No    Patient Centered Rounds: I have performed bedside rounds with nursing staff today.        Time Spent for Care:  55 .  More than 50% of total time spent on counseling and coordination of care as described above.    Current Length of Stay: 3 day(s)    Current Patient Status: Inpatient       Code Status: Level 1 - Full Code      Subjective:   nad    Objective:     Vitals:   Temp (24hrs), Av.4 °F (36.9 °C), Min:98.3 °F (36.8 °C), Max:98.5 °F (36.9 °C)    Temp:  [98.3 °F (36.8 °C)-98.5 °F (36.9 °C)] 98.3 °F (36.8 °C)  HR:  [85-96] 96  Resp:  [18] 18  BP: (112-136)/(55-82) 123/69  SpO2:  [96 %-98 %] 96 %  Body mass index is 44.47 kg/m².     Input and Output Summary (last 24 hours):       Intake/Output Summary (Last 24 hours) at 2023 1031  Last data filed at 2023 0601  Gross per 24 hour   Intake 860 ml   Output 2785 ml   Net -1925 ml       Physical Exam:     Physical Exam  Constitutional:       Appearance: Normal appearance.   Cardiovascular:      Rate and Rhythm: Normal rate and regular rhythm.   Pulmonary:      Effort: Pulmonary effort is normal.      Breath sounds: Normal breath sounds.   Abdominal:      General: Abdomen is flat. There is no distension.      Palpations: Abdomen is soft.   Musculoskeletal:         General: Normal range of motion.      Cervical back: Normal range of motion and neck supple. No rigidity.   Skin:     Coloration: Skin is not jaundiced.   Neurological:      General: No focal  deficit present.      Mental Status: He is alert and oriented to person, place, and time.   Psychiatric:         Mood and Affect: Mood normal.         Additional Data:     Labs:    Results from last 7 days   Lab Units 12/26/23  0546   WBC Thousand/uL 5.92   HEMOGLOBIN g/dL 9.3*   HEMATOCRIT % 31.0*   PLATELETS Thousands/uL 253   NEUTROS PCT % 63   LYMPHS PCT % 17   MONOS PCT % 10   EOS PCT % 7*     Results from last 7 days   Lab Units 12/26/23  0900 12/24/23  0443 12/23/23  1515   POTASSIUM mmol/L 4.6   < > 3.7   CHLORIDE mmol/L 96   < > 101   CO2 mmol/L 27   < > 22   BUN mg/dL 15   < > 11   CREATININE mg/dL 1.80*   < > 1.13   CALCIUM mg/dL 7.3*   < > 8.0*   ALK PHOS U/L  --   --  105*   ALT U/L  --   --  9   AST U/L  --   --  18    < > = values in this interval not displayed.           * I Have Reviewed All Lab Data Listed Above.  * Additional Pertinent Lab Tests Reviewed: All Labs Within Last 24 Hours Reviewed        Recent Cultures (last 7 days):           Last 24 Hours Medication List:   Current Facility-Administered Medications   Medication Dose Route Frequency Provider Last Rate    albuterol  2 puff Inhalation Q4H PRN William Stahl MD      apixaban  5 mg Oral BID William Stahl MD      atorvastatin  10 mg Oral Daily William Stahl MD      budesonide-formoterol  2 puff Inhalation BID William Stahl MD      bumetanide (BUMEX) 12.5 mg infusion 50 mL  0.5 mg/hr Intravenous Continuous William Stahl MD 0.5 mg/hr (12/26/23 0030)    insulin glargine  6 Units Subcutaneous HS Faheem Banda DO      insulin lispro  1-6 Units Subcutaneous TID AC William Stahl MD      metoprolol succinate  50 mg Oral Daily William Stahl MD      pantoprazole  40 mg Oral Daily William Stahl MD      potassium chloride  40 mEq Oral BID William Stahl MD      pregabalin  50 mg Oral HS William Stahl MD      spironolactone  25 mg Oral Daily William Stahl MD      umeclidinium  1 puff Inhalation Daily William Stahl MD          Today, Patient Was Seen By: Cheryle Kumar DO    **  Please Note: Dictation voice to text software may have been used in the creation of this document. **

## 2023-12-26 NOTE — UTILIZATION REVIEW
"Continued Stay Review    Date: 12/26/2023                          Current Patient Class: Inpatient  Current Level of Care: Med/Surg    HPI:58 y.o. male initially admitted on 12/23/2023     Assessment/Plan: Acute on Chronic HF pEF.  Telemetry.  Continue IV Bumex gtt.  Daily weight.  I&O.  Continue potassium supplementation with diuretics.  Monitor BMP BID.  Strict ins and outs, daily standing weights, low-sodium diet and 1800 mL fluid restriction.    Vital Signs: /73   Pulse 81   Temp 98.7 °F (37.1 °C)   Resp 18   Ht 6' 1\" (1.854 m)   Wt (!) 153 kg (337 lb 1.3 oz)   SpO2 97%   BMI 44.47 kg/m²     Pertinent Labs/Diagnostic Results:     Results from last 7 days   Lab Units 12/26/23  0546 12/24/23  0443 12/23/23  1515   WBC Thousand/uL 5.92 5.83 6.59   HEMOGLOBIN g/dL 9.3* 9.8* 10.5*   HEMATOCRIT % 31.0* 31.3* 34.1*   PLATELETS Thousands/uL 253 235 243   NEUTROS ABS Thousands/µL 3.74 3.97 4.34     Results from last 7 days   Lab Units 12/26/23  0900 12/26/23  0546 12/25/23 2006 12/25/23  1652 12/25/23  0439 12/24/23  0443   SODIUM mmol/L 135  --  134* 135 139 136   POTASSIUM mmol/L 4.6  --  4.0 4.1 3.3* 3.5   CHLORIDE mmol/L 96  --  93* 95* 97 96   CO2 mmol/L 27  --  29 28 31 27   ANION GAP mmol/L 12  --  12 12 11 13   BUN mg/dL 15  --  16 15 12 12   CREATININE mg/dL 1.80*  --  1.97* 1.61* 1.48* 1.18   EGFR ml/min/1.73sq m 41  --  36 47 52 68   CALCIUM mg/dL 7.3*  --  7.8* 7.5* 7.6* 7.9*   MAGNESIUM mg/dL  --  2.5  --   --   --  1.7*     Results from last 7 days   Lab Units 12/24/23  1903 12/23/23  1515   AST U/L  --  18   ALT U/L  --  9   ALK PHOS U/L  --  105*   TOTAL PROTEIN g/dL 5.9* 6.5   ALBUMIN g/dL  --  3.6   TOTAL BILIRUBIN mg/dL  --  0.68     Results from last 7 days   Lab Units 12/26/23  1203 12/26/23  0814 12/25/23  1707 12/25/23  1200 12/25/23  0800 12/24/23  2041 12/24/23  1631 12/24/23  1200 12/24/23  0759 12/23/23  2141   POC GLUCOSE mg/dl 289* 217* 211* 263* 177* 255* 180* 202* 158* 196* "     Results from last 7 days   Lab Units 12/26/23  0900 12/25/23 2006 12/25/23  1652 12/25/23  0439 12/24/23  0443 12/23/23  1515   GLUCOSE RANDOM mg/dL 362* 281* 213* 162* 141* 174*     Results from last 7 days   Lab Units 12/23/23  1515   BNP pg/mL 245*     Medications:   Scheduled Medications:  apixaban, 5 mg, Oral, BID  atorvastatin, 10 mg, Oral, Daily  budesonide-formoterol, 2 puff, Inhalation, BID  Empagliflozin, 10 mg, Oral, Daily  insulin glargine, 6 Units, Subcutaneous, HS  insulin lispro, 1-6 Units, Subcutaneous, TID AC  metoprolol succinate, 50 mg, Oral, Daily  pantoprazole, 40 mg, Oral, Daily  potassium chloride, 40 mEq, Oral, BID  pregabalin, 50 mg, Oral, HS  spironolactone, 25 mg, Oral, Daily  umeclidinium, 1 puff, Inhalation, Daily      Continuous IV Infusions:  bumetanide (BUMEX) 12.5 mg infusion 50 mL, 0.5 mg/hr, Intravenous, Continuous      PRN Meds:  albuterol, 2 puff, Inhalation, Q4H PRN        Discharge Plan: TBD    Network Utilization Review Department  ATTENTION: Please call with any questions or concerns to [phone] and carefully listen to the prompts so that you are directed to the right person. All voicemails are confidential.   For Discharge needs, contact Care Management DC Support Team at [phone] opt. [phone]  Send all requests for admission clinical reviews, approved or denied determinations and any other requests to dedicated fax number below belonging to the [facility] where the patient is receiving treatment. List of dedicated fax numbers for the Facilities:  FACILITY NAME UR FAX NUMBER   ADMISSION DENIALS (Administrative/Medical Necessity) [phone]   DISCHARGE SUPPORT TEAM (NETWORK) [phone]   PARENT CHILD HEALTH (Maternity/NICU/Pediatrics) [phone]   [facility] [phone]   [facility] [phone]   [facility] [phone]   [facility] [phone]   [facility]  [facility] [phone]   [facility] [phone]   [facility] [phone]   [facility] [phone]   [facility] [phone]   [facility] [phone]   [facility] [phone]

## 2023-12-26 NOTE — ASSESSMENT & PLAN NOTE
Wt Readings from Last 3 Encounters:   12/26/23 (!) 153 kg (337 lb 1.3 oz)   12/15/23 (!) 157 kg (346 lb)   12/13/23 (!) 157 kg (346 lb 3.2 oz)     Acute on chronic heart failure with preserved EF. Stage C, NYHA III.  Etiology A-fib, hypertension, obesity  Dry weight 335 pounds, weight today 349 lb.  proBNP 245, previously in the 90-160s.  Echo 4/2023 EF of 50%, grade 2 diastolic dysfunction.  Diuretic regimen Bumex 6 mg twice daily, spironolactone 25 mg daily, metolazone 2.5 mg as needed and Jardiance 10 mg daily.  Acute on chronic exacerbation, not responding to outpatient treatment.  Bumex gtt as per heart failure  Continue spironolactone, Jardiance.  Continue potassium supplementation with diuretics.  Monitor BMP bid  Strict ins and outs, daily standing weights, low-sodium diet and 1800 mL fluid restriction.  Heart failure recommendations appreciated  Patient continued on Bumex drip, will increase potassium supplementation, supplement magnesium as well, twice daily BMP  Patient's weight down to 339 pounds with good urine output, continued on Bumex as per heart failure

## 2023-12-26 NOTE — PROGRESS NOTES
Heart Failure/ Pulmonary Hypertension Progress Note - Mesfin Cano 58 y.o. male MRN: [id number]    Unit/Bed#: -01 Encounter: [id number]      Assessment:    Principal Problem:    Acute on chronic heart failure with preserved ejection fraction (HFpEF) (HCC)  Active Problems:    Type 2 diabetes mellitus with hyperglycemia, without long-term current use of insulin (HCC)    Hyperlipidemia    Severe persistent asthma    Paroxysmal atrial fibrillation (HCC)    Diabetic neuropathy (HCC)      Objective:   Intake/ Output: 1040/3665: -2625  Weight: 337 lbs- standing (347 lbs on admit)  Tele:   MAPs: 90 mmHg    # Acute on Chronic HFpEF- HFimpEF, Stage C, NYHA III     Volume up on exam today, recent CardioMems PAD 18-19mmHg, now up to mid 20's   Diuretic: recent increase to Bumex 3 mg BID w/ K 40 meq BID   Weight:  344 lbs --349 lbs (of note he was 314 lbs on an admit in 2022)  BNP: 8/4/23: 122  NT proBNP: 7/29/22: 903   609 4/8/22      cardiomems in place, last read June Studies- personally reviewed by me    Echo 4/11/23:  LVEF: 50%  Grade 2 DD  RV: normal     LHC 9/6/22: No obstructive CAD     Pharm Nuc Stress 9/2/22: Abnormal study due to the presence of an inferior and inferolateral infarct without significant ischemia     RHC 3/9/22:    RA 4    RV 35/4    PA 35/12/21    PCWP 10    PA sat 64%    Travis CO 5.56 L/min    Travis CI 2.2L/min/m2    PVR 1.97 SAGASTUME        TTE 11/12/2021:   LVEF 55%. LVIDd 6.0 cm. Grade 1 DD. Normal RV. Trace TR.   TTE 07/19/2021:   LVEF 50%. LVIDd 6.13 cm. Grade 1 DD. Normal RV. Trace MR and TR. Mildly dilated IVC.     TTE 03/25/2020:   LVEF 40-45%. LVIDd 6.12 cm. Normal RV.         Neurohormonal Blockade:    --Beta Blocker: metoprolol succinate 50 mg daily.     --ARNi / ACEi / ARB: No.     --Aldosterone Antagonist: spironolactone 25 mg daily    --SGLT2 Inhibitor: Jardiance 10 mg   --Diuretic: Bumex 6 mg BID, K 40 meq BID, prn metolazone  Inpt: Bumex gtt 0.5 mg/hr      Sudden Cardiac Death  "Risk Reduction:    --LVEF >35%.         Electrical Resynchronization:    --Candidacy for BiV device: narrow QRS.         Advanced Therapies:  Will continue to monitor. LVEF recovered        # Atrial fibrillation    [id number] = 3 (HF, HTN, DM).    Diagnosed 02/2022.    Anticoagulation on Eliquis.     Rate control: metoprolol 50 mg daily  Rhythm control: No.        # Hypertension, mostly controlled, amlodipine 5 mg, metoprolol      # Hyperlipidemia - atorvastatin 10 mg   10/6/23: LDL 86, HDL 55       # Diabetes mellitus, type II      Non-insulin dependent.    HbA1c 6/16/22: 5.7%        # Obstructive sleep apnea   # Chronic respiratory failure    # Asthma, moderate persistent     # S/p gastric bypass (Samuel-en-Y)surgery     # History of tobacco abuse    # Carpal tunnel syndrome, bilateral     # CKD, Cr 1.43  # s/p CardioMems implant 3/9/22    PAD 25 mmHg- most recently - which is up for him          Plan:  Continue with current diuresis  Will add Entresto to Jardiance given fluid retention, less BNP to try to aid his output volume management  Has cardiomems though still getting admitted    Review of Systems   Constitutional:  Negative for activity change, appetite change, fatigue and unexpected weight change.   HENT:  Negative for congestion and nosebleeds.    Eyes: Negative.    Respiratory:  Negative for cough, chest tightness and shortness of breath.    Cardiovascular:  Positive for leg swelling. Negative for chest pain and palpitations.   Gastrointestinal:  Negative for abdominal distention.   Endocrine: Negative.    Genitourinary: Negative.    Musculoskeletal: Negative.    Skin: Negative.    Neurological:  Negative for dizziness, syncope and weakness.   Hematological: Negative.    Psychiatric/Behavioral: Negative.          Central Line (day, reason):  Cotton catheter (day, reason):    Vitals: Blood pressure 123/69, pulse 96, temperature 98.3 °F (36.8 °C), temperature source Oral, resp. rate 18, height 6' 1\" (1.854 " m), weight (!) 153 kg (337 lb 1.3 oz), SpO2 96%., Body mass index is 44.47 kg/m²., I/O last 3 completed shifts:  In: 1220 [P.O.:1220]  Out: 5615 [Urine:5615]  No intake/output data recorded.  Wt Readings from Last 3 Encounters:   12/26/23 (!) 153 kg (337 lb 1.3 oz)   12/15/23 (!) 157 kg (346 lb)   12/13/23 (!) 157 kg (346 lb 3.2 oz)       Intake/Output Summary (Last 24 hours) at 12/26/2023 0949  Last data filed at 12/26/2023 0601  Gross per 24 hour   Intake 860 ml   Output 3185 ml   Net -2325 ml     I/O last 3 completed shifts:  In: 1220 [P.O.:1220]  Out: 5615 [Urine:5615]    No significant arrhythmias seen on telemetry review.       Physical Exam:  Vitals:    12/26/23 0600 12/26/23 0726 12/26/23 0845 12/26/23 0900   BP:  131/78 123/69    BP Location:  Left arm     Pulse:  90 96    Resp:  18     Temp:  98.3 °F (36.8 °C)     TempSrc:  Oral     SpO2:  96%  96%   Weight: (!) 153 kg (337 lb 1.3 oz)      Height:           GEN: Mesfin Cano appears well, alert and oriented x 3, pleasant and cooperative   HEENT: pupils equal, round, and reactive to light; extraocular muscles intact  NECK: supple, no carotid bruits   HEART: regular rhythm, normal S1 and S2, no murmurs, clicks, gallops or rubs, JVP is    LUNGS: clear to auscultation bilaterally; no wheezes, rales, or rhonchi   ABDOMEN: normal bowel sounds, soft, no tenderness, no distention  EXTREMITIES: peripheral pulses normal; no clubbing, cyanosis, or edema  NEURO: no focal findings   SKIN: normal without suspicious lesions on exposed skin      Current Facility-Administered Medications:     albuterol (PROVENTIL HFA,VENTOLIN HFA) inhaler 2 puff, 2 puff, Inhalation, Q4H PRN, William Stahl MD    apixaban (ELIQUIS) tablet 5 mg, 5 mg, Oral, BID, William Stahl MD, 5 mg at 12/26/23 0845    atorvastatin (LIPITOR) tablet 10 mg, 10 mg, Oral, Daily, William Stahl MD, 10 mg at 12/26/23 0845    budesonide-formoterol (SYMBICORT) 160-4.5 mcg/act inhaler 2 puff, 2 puff, Inhalation, BID,  William Stahl MD, 2 puff at 12/26/23 0901    bumetanide (BUMEX) 12.5 mg infusion 50 mL, 0.5 mg/hr, Intravenous, Continuous, William Stahl MD, Last Rate: 2 mL/hr at 12/26/23 0030, 0.5 mg/hr at 12/26/23 0030    insulin glargine (LANTUS) subcutaneous injection 6 Units 0.06 mL, 6 Units, Subcutaneous, HS, Faheem Banai, DO, 6 Units at 12/25/23 2200    insulin lispro (HumaLOG) 100 units/mL subcutaneous injection 1-6 Units, 1-6 Units, Subcutaneous, TID AC, 2 Units at 12/26/23 0851 **AND** Fingerstick Glucose (POCT), , , TID AC, William Stahl MD    metoprolol succinate (TOPROL-XL) 24 hr tablet 50 mg, 50 mg, Oral, Daily, William Stahl MD, 50 mg at 12/26/23 0845    pantoprazole (PROTONIX) EC tablet 40 mg, 40 mg, Oral, Daily, William Stahl MD, 40 mg at 12/26/23 0845    potassium chloride (K-DUR,KLOR-CON) CR tablet 40 mEq, 40 mEq, Oral, BID, William Stahl MD, 40 mEq at 12/26/23 0845    pregabalin (LYRICA) capsule 50 mg, 50 mg, Oral, HS, William Stahl MD, 50 mg at 12/25/23 2200    spironolactone (ALDACTONE) tablet 25 mg, 25 mg, Oral, Daily, William Stahl MD, 25 mg at 12/26/23 0845    umeclidinium 62.5 mcg/actuation inhaler AEPB 1 puff, 1 puff, Inhalation, Daily, William Stahl MD, 1 puff at 12/26/23 0900      Labs & Results:        Results from last 7 days   Lab Units 12/26/23  0546 12/24/23  0443 12/23/23  1515   WBC Thousand/uL 5.92 5.83 6.59   HEMOGLOBIN g/dL 9.3* 9.8* 10.5*   HEMATOCRIT % 31.0* 31.3* 34.1*   PLATELETS Thousands/uL 253 235 243         Results from last 7 days   Lab Units 12/25/23 2006 12/25/23  1652 12/25/23  0439 12/24/23  0443 12/23/23  1515   POTASSIUM mmol/L 4.0 4.1 3.3*   < > 3.7   CHLORIDE mmol/L 93* 95* 97   < > 101   CO2 mmol/L 29 28 31   < > 22   BUN mg/dL 16 15 12   < > 11   CREATININE mg/dL 1.97* 1.61* 1.48*   < > 1.13   CALCIUM mg/dL 7.8* 7.5* 7.6*   < > 8.0*   ALK PHOS U/L  --   --   --   --  105*   ALT U/L  --   --   --   --  9   AST U/L  --   --   --   --  18    < > = values in this interval not displayed.            Counseling / Coordination of Care  Total floor / unit time spent today 25 minutes.  Greater than 50% of total time was spent with the patient and / or family counseling and / or coordination of care.  A description of the counseling / coordination of care: 15.      Thank you for the opportunity to participate in the care of this patient.  MAMI MILLS D.O.  DIRECTOR OF HEART FAILURE/ PULMONARY HYPERTENSION  MEDICAL DIRECTOR OF LVAD PROGRAM  [facility]

## 2023-12-26 NOTE — UTILIZATION REVIEW
NOTIFICATION OF INPATIENT ADMISSION   AUTHORIZATION REQUEST   SERVICING FACILITY:   [facility]  Address: [address] [place] [address]  Tax ID: [id number]  NPI: [id number] ATTENDING PROVIDER:  Attending Name and NPI#: Cheryle Kumar Do [[id number]]  Address: [address] [place] [address]  Phone: [phone]   ADMISSION INFORMATION:  Place of Service: Inpatient [facility]  Place of Service Code: 21  Inpatient Admission Date/Time: 12/23/23  5:00 PM  Discharge Date/Time: No discharge date for patient encounter.  Admitting Diagnosis Code/Description:  Edema [R60.9]  Shortness of breath [R06.02]  CHF exacerbation (HCC) [I50.9]     UTILIZATION REVIEW CONTACT:  Bayron Candelaria Utilization   Network Utilization Review Department  Phone: [phone]  Fax: [phone]  Email: [email or web address]  Contact for approvals/pending authorizations, clinical reviews, and discharge.     PHYSICIAN ADVISORY SERVICES:  Medical Necessity Denial & Vyit-ho-Fsmy Review  Phone: [phone]  Fax: [phone]  Email: [email or web address]     DISCHARGE SUPPORT TEAM:  For Patients Discharge Needs & Updates  Phone: [phone] opt. 2 Fax: [phone]  Email: [email or web address]

## 2023-12-26 NOTE — PLAN OF CARE
Problem: PAIN - ADULT  Goal: Verbalizes/displays adequate comfort level or baseline comfort level  Description: Interventions:  - Encourage patient to monitor pain and request assistance  - Assess pain using appropriate pain scale  - Administer analgesics based on type and severity of pain and evaluate response  - Implement non-pharmacological measures as appropriate and evaluate response  - Consider cultural and social influences on pain and pain management  - Notify physician/advanced practitioner if interventions unsuccessful or patient reports new pain  Outcome: Progressing     Problem: INFECTION - ADULT  Goal: Absence or prevention of progression during hospitalization  Description: INTERVENTIONS:  - Assess and monitor for signs and symptoms of infection  - Monitor lab/diagnostic results  - Monitor all insertion sites, i.e. indwelling lines, tubes, and drains  - Monitor endotracheal if appropriate and nasal secretions for changes in amount and color  - [place] appropriate cooling/warming therapies per order  - Administer medications as ordered  - Instruct and encourage patient and family to use good hand hygiene technique  - Identify and instruct in appropriate isolation precautions for identified infection/condition  Outcome: Progressing  Goal: Absence of fever/infection during neutropenic period  Description: INTERVENTIONS:  - Monitor WBC    Outcome: Progressing

## 2023-12-26 NOTE — PROGRESS NOTES
Cardiology Progress Note - Mesfin Cano 58 y.o. male MRN: [id number]    Unit/Bed#: -01 Encounter: [id number]      Assessment/Plan:   Acute on chronic HFpEF-EF 50%, s/p CardioMEMS Implant 3/9/2022  Last echo 4/2023  CardioMEMS: Goal 15. PAD was 15 at recent office visit 12/15, at that time up from 12-14 over the previous days  Net I's and O's since admission: -7635 mL  Patient appears hypervolemic on exam with B/L LE edema  Continue Bumex drip  Resume home Jardiance. Price check Entresto.   Continue 2 g sodium diet, 1800 mL fluid restriction, daily weights, strict I's and O's    2.  Paroxysmal atrial fibrillation  Continue Toprol, Eliquis.  [id number].  In NSR on telemetry.  Continue telemetry    3.  VICKY  Follow-up with primary care +/- pulmonology outpatient. Encourage regular CPAP use once able to obtain CPAP    4.  Hypertension  BPs stable.  Continue Toprol, spironolactone    5.  Hyperlipidemia  Continue statin    6.  CKD  Management per primary team    7.  History of tobacco use    8. Type 2 DM  Management per primary team    9. Asthma  Management per primary team    Subjective:   Patient seen and examined.  No significant events overnight.  Patient states he is still experiencing a bit of SOB with exertion, but this has improved since admission.  He states his leg swelling and abdominal bloating has decreased since admission.  He states his weight is down 12 pounds since admission.  He states his normal weight is 335 pounds, and today's weight is 337 pounds.  He denies orthopnea or PND overnight or today.  He states he follows a low-salt diet at home and is compliant with his home medications.  He states he noticed increasing weight gain, worsening SOB, and worsening lower extremity edema for about a week prior to admission.  He took metolazone 3 times over the week before his admission with no improvement in symptoms.   He has a history of CPAP, he states he is not currently using a CPAP machine as  "his was recalled and he is waiting for a letter from the company to obtain a new machine.    Objective:     Vitals: Blood pressure 111/74, pulse 81, temperature 97.6 °F (36.4 °C), resp. rate 18, height 6' 1\" (1.854 m), weight (!) 153 kg (337 lb 1.3 oz), SpO2 97%., Body mass index is 44.47 kg/m².,   Orthostatic Blood Pressures      Flowsheet Row Most Recent Value   Blood Pressure 111/74 filed at 12/26/2023 1142   Patient Position - Orthostatic VS Sitting filed at 12/26/2023 0726              Intake/Output Summary (Last 24 hours) at 12/26/2023 1437  Last data filed at 12/26/2023 1300  Gross per 24 hour   Intake 1020 ml   Output 2505 ml   Net -1485 ml         Physical Exam:   GEN: Alert and oriented x 3, in no acute distress. Obese body habitus.  HEENT: Sclera anicteric, conjunctivae pink, mucous membranes moist. Oropharynx clear.   NECK: Supple, no significant JVD. Trachea midline, no thyromegaly.   HEART: Regular rhythm, normal S1 and S2, no murmurs, clicks, gallops or rubs. PMI nondisplaced, no thrills.   LUNGS: Clear to auscultation bilaterally; no wheezes, rales, or rhonchi. No increased work of breathing or signs of respiratory distress.   ABDOMEN: Soft, nontender, non-distended.   EXTREMITIES: 2+ bilateral lower extremity pitting edema.  Skin warm and well perfused, no clubbing or cyanosis.  NEURO: No focal findings. Normal speech. Mood and affect normal.   SKIN: Normal without suspicious lesions on exposed skin.      Telemetry:  Telemetry Orders (From admission, onward)               24 Hour Telemetry Monitoring  Continuous x 24 Hours (Telem)        Question:  Reason for 24 Hour Telemetry  Answer:  Decompensated CHF- and any one of the following: continuous diuretic infusion or total diuretic dose >200 mg daily, associated electrolyte derangement (I.e. K < 3.0), ionotropic drip (continuous infusion), hx of ventricular arrhythmia, or new EF < 35%                     Telemetry Reviewed: Normal Sinus Rhythm and " PVCs      Medications:      Current Facility-Administered Medications:     albuterol (PROVENTIL HFA,VENTOLIN HFA) inhaler 2 puff, 2 puff, Inhalation, Q4H PRN, William Stahl MD    apixaban (ELIQUIS) tablet 5 mg, 5 mg, Oral, BID, William Stahl MD, 5 mg at 12/26/23 0845    atorvastatin (LIPITOR) tablet 10 mg, 10 mg, Oral, Daily, William Stahl MD, 10 mg at 12/26/23 0845    budesonide-formoterol (SYMBICORT) 160-4.5 mcg/act inhaler 2 puff, 2 puff, Inhalation, BID, William Stahl MD, 2 puff at 12/26/23 0901    bumetanide (BUMEX) 12.5 mg infusion 50 mL, 0.5 mg/hr, Intravenous, Continuous, William Stahl MD, Last Rate: 2 mL/hr at 12/26/23 0030, 0.5 mg/hr at 12/26/23 0030    Empagliflozin (JARDIANCE) tablet 10 mg, 10 mg, Oral, Daily, Puma Jarquin PA-C    insulin glargine (LANTUS) subcutaneous injection 6 Units 0.06 mL, 6 Units, Subcutaneous, HS, Faheem Banda, DO, 6 Units at 12/25/23 2200    insulin lispro (HumaLOG) 100 units/mL subcutaneous injection 1-6 Units, 1-6 Units, Subcutaneous, TID AC, 3 Units at 12/26/23 1303 **AND** Fingerstick Glucose (POCT), , , TID AC, William Stahl MD    metoprolol succinate (TOPROL-XL) 24 hr tablet 50 mg, 50 mg, Oral, Daily, William Stahl MD, 50 mg at 12/26/23 0845    pantoprazole (PROTONIX) EC tablet 40 mg, 40 mg, Oral, Daily, William Stahl MD, 40 mg at 12/26/23 0845    potassium chloride (K-DUR,KLOR-CON) CR tablet 40 mEq, 40 mEq, Oral, BID, William Stahl MD, 40 mEq at 12/26/23 0845    pregabalin (LYRICA) capsule 50 mg, 50 mg, Oral, HS, William Stahl MD, 50 mg at 12/25/23 2200    spironolactone (ALDACTONE) tablet 25 mg, 25 mg, Oral, Daily, William Stahl MD, 25 mg at 12/26/23 0845    umeclidinium 62.5 mcg/actuation inhaler AEPB 1 puff, 1 puff, Inhalation, Daily, William Stahl MD, 1 puff at 12/26/23 0900     Labs & Results:        Results from last 7 days   Lab Units 12/26/23  0546 12/24/23  0443 12/23/23  1515   WBC Thousand/uL 5.92 5.83 6.59   HEMOGLOBIN g/dL 9.3* 9.8* 10.5*   HEMATOCRIT % 31.0* 31.3* 34.1*    PLATELETS Thousands/uL 253 235 243         Results from last 7 days   Lab Units 12/26/23  0900 12/25/23  2006 12/25/23  1652 12/24/23  0443 12/23/23  1515   POTASSIUM mmol/L 4.6 4.0 4.1   < > 3.7   CHLORIDE mmol/L 96 93* 95*   < > 101   CO2 mmol/L 27 29 28   < > 22   BUN mg/dL 15 16 15   < > 11   CREATININE mg/dL 1.80* 1.97* 1.61*   < > 1.13   CALCIUM mg/dL 7.3* 7.8* 7.5*   < > 8.0*   ALK PHOS U/L  --   --   --   --  105*   ALT U/L  --   --   --   --  9   AST U/L  --   --   --   --  18    < > = values in this interval not displayed.         Results from last 7 days   Lab Units 12/26/23  0546 12/24/23 0443   MAGNESIUM mg/dL 2.5 1.7*

## 2023-12-27 LAB
ANION GAP SERPL CALCULATED.3IONS-SCNC: 11 MMOL/L
ANION GAP SERPL CALCULATED.3IONS-SCNC: 11 MMOL/L
BASOPHILS NFR BLD MANUAL: 1 % (ref 0–1)
BUN SERPL-MCNC: 18 MG/DL (ref 5–25)
BUN SERPL-MCNC: 23 MG/DL (ref 5–25)
CALCIUM SERPL-MCNC: 7.6 MG/DL (ref 8.4–10.2)
CALCIUM SERPL-MCNC: 7.8 MG/DL (ref 8.4–10.2)
CHLORIDE SERPL-SCNC: 94 MMOL/L (ref 96–108)
CHLORIDE SERPL-SCNC: 95 MMOL/L (ref 96–108)
CO2 SERPL-SCNC: 27 MMOL/L (ref 21–32)
CO2 SERPL-SCNC: 31 MMOL/L (ref 21–32)
CREAT SERPL-MCNC: 1.83 MG/DL (ref 0.6–1.3)
CREAT SERPL-MCNC: 1.99 MG/DL (ref 0.6–1.3)
GFR SERPL CREATININE-BSD FRML MDRD: 36 ML/MIN/1.73SQ M
GFR SERPL CREATININE-BSD FRML MDRD: 40 ML/MIN/1.73SQ M
GLUCOSE SERPL-MCNC: 209 MG/DL (ref 65–140)
GLUCOSE SERPL-MCNC: 223 MG/DL (ref 65–140)
GLUCOSE SERPL-MCNC: 240 MG/DL (ref 65–140)
GLUCOSE SERPL-MCNC: 282 MG/DL (ref 65–140)
GLUCOSE SERPL-MCNC: 291 MG/DL (ref 65–140)
GLUCOSE SERPL-MCNC: 302 MG/DL (ref 65–140)
IMM EOSINOPHIL NFR BLD MANUAL: 5 % (ref 0–6)
KAPPA LC FREE SER-MCNC: 31.3 MG/L (ref 3.3–19.4)
KAPPA LC FREE/LAMBDA FREE SER: 0.96 {RATIO} (ref 0.26–1.65)
LAMBDA LC FREE SERPL-MCNC: 32.7 MG/L (ref 5.7–26.3)
LYMPHOCYTES NFR BLD: 13 % (ref 14–44)
MONOCYTES NFR BLD AUTO: 5 % (ref 4–12)
NEUTS BAND NFR BLD MANUAL: 1 THOUSAND/UL
NEUTS SEG NFR BLD AUTO: 75 % (ref 45–77)
PATHOLOGY REVIEW: YES
PLATELET BLD QL SMEAR: ADEQUATE
POTASSIUM SERPL-SCNC: 3.6 MMOL/L (ref 3.5–5.3)
POTASSIUM SERPL-SCNC: 4 MMOL/L (ref 3.5–5.3)
RBC MORPH BLD: NORMAL
SODIUM SERPL-SCNC: 132 MMOL/L (ref 135–147)
SODIUM SERPL-SCNC: 137 MMOL/L (ref 135–147)
TOTAL CELLS COUNTED SPEC: 100

## 2023-12-27 PROCEDURE — 80048 BASIC METABOLIC PNL TOTAL CA: CPT | Performed by: INTERNAL MEDICINE

## 2023-12-27 PROCEDURE — 82948 REAGENT STRIP/BLOOD GLUCOSE: CPT

## 2023-12-27 PROCEDURE — 99233 SBSQ HOSP IP/OBS HIGH 50: CPT | Performed by: INTERNAL MEDICINE

## 2023-12-27 PROCEDURE — 99232 SBSQ HOSP IP/OBS MODERATE 35: CPT | Performed by: INTERNAL MEDICINE

## 2023-12-27 RX ORDER — BUMETANIDE 2 MG/1
6 TABLET ORAL 2 TIMES DAILY
Status: DISCONTINUED | OUTPATIENT
Start: 2023-12-27 | End: 2023-12-29 | Stop reason: HOSPADM

## 2023-12-27 RX ADMIN — INSULIN LISPRO 3 UNITS: 100 INJECTION, SOLUTION INTRAVENOUS; SUBCUTANEOUS at 17:29

## 2023-12-27 RX ADMIN — APIXABAN 5 MG: 5 TABLET, FILM COATED ORAL at 09:09

## 2023-12-27 RX ADMIN — BUMETANIDE 6 MG: 2 TABLET ORAL at 17:33

## 2023-12-27 RX ADMIN — ATORVASTATIN CALCIUM 10 MG: 10 TABLET, FILM COATED ORAL at 09:09

## 2023-12-27 RX ADMIN — APIXABAN 5 MG: 5 TABLET, FILM COATED ORAL at 17:29

## 2023-12-27 RX ADMIN — INSULIN LISPRO 4 UNITS: 100 INJECTION, SOLUTION INTRAVENOUS; SUBCUTANEOUS at 12:22

## 2023-12-27 RX ADMIN — BUDESONIDE AND FORMOTEROL FUMARATE DIHYDRATE 2 PUFF: 160; 4.5 AEROSOL RESPIRATORY (INHALATION) at 09:17

## 2023-12-27 RX ADMIN — PREGABALIN 50 MG: 50 CAPSULE ORAL at 22:31

## 2023-12-27 RX ADMIN — EMPAGLIFLOZIN 10 MG: 10 TABLET, FILM COATED ORAL at 09:17

## 2023-12-27 RX ADMIN — INSULIN LISPRO 2 UNITS: 100 INJECTION, SOLUTION INTRAVENOUS; SUBCUTANEOUS at 09:08

## 2023-12-27 RX ADMIN — UMECLIDINIUM 1 PUFF: 62.5 AEROSOL, POWDER ORAL at 09:17

## 2023-12-27 RX ADMIN — INSULIN GLARGINE 6 UNITS: 100 INJECTION, SOLUTION SUBCUTANEOUS at 22:31

## 2023-12-27 RX ADMIN — PANTOPRAZOLE SODIUM 40 MG: 40 TABLET, DELAYED RELEASE ORAL at 09:09

## 2023-12-27 RX ADMIN — POTASSIUM CHLORIDE 40 MEQ: 1500 TABLET, EXTENDED RELEASE ORAL at 17:29

## 2023-12-27 RX ADMIN — BUDESONIDE AND FORMOTEROL FUMARATE DIHYDRATE 2 PUFF: 160; 4.5 AEROSOL RESPIRATORY (INHALATION) at 17:29

## 2023-12-27 RX ADMIN — POTASSIUM CHLORIDE 40 MEQ: 1500 TABLET, EXTENDED RELEASE ORAL at 09:09

## 2023-12-27 RX ADMIN — SACUBITRIL AND VALSARTAN 1 TABLET: 24; 26 TABLET, FILM COATED ORAL at 17:34

## 2023-12-27 NOTE — ASSESSMENT & PLAN NOTE
Wt Readings from Last 3 Encounters:   12/27/23 (!) 153 kg (338 lb 3 oz)   12/15/23 (!) 157 kg (346 lb)   12/13/23 (!) 157 kg (346 lb 3.2 oz)     Acute on chronic heart failure with preserved EF. Stage C, NYHA III.  Etiology A-fib, hypertension, obesity  Dry weight 335 pounds, weight today 349 lb.  proBNP 245, previously in the 90-160s.  Echo 4/2023 EF of 50%, grade 2 diastolic dysfunction.  Diuretic regimen Bumex 6 mg twice daily, spironolactone 25 mg daily, metolazone 2.5 mg as needed and Jardiance 10 mg daily.  Acute on chronic exacerbation, not responding to outpatient treatment.  Bumex gtt as per heart failure  Continue spironolactone, Jardiance.  Continue potassium supplementation with diuretics.  Monitor BMP bid  Strict ins and outs, daily standing weights, low-sodium diet and 1800 mL fluid restriction.  Heart failure recommendations appreciated  Patient continued on Bumex drip, will increase potassium supplementation, supplement magnesium as well, twice daily BMP  Patient's weight down to 339 pounds with good urine output, continued on Bumex as per heart failure

## 2023-12-27 NOTE — PROGRESS NOTES
[facility]  Progress Note  Name: Mesfin Cano I  MRN: [id number]  Unit/Bed#: -01 I Date of Admission: 12/23/2023   Date of Service: 12/27/2023 I Hospital Day: 4    Assessment/Plan   * Acute on chronic heart failure with preserved ejection fraction (HFpEF) ([facility])  Assessment & Plan  Wt Readings from Last 3 Encounters:   12/27/23 (!) 153 kg (338 lb 3 oz)   12/15/23 (!) 157 kg (346 lb)   12/13/23 (!) 157 kg (346 lb 3.2 oz)     Acute on chronic heart failure with preserved EF. Stage C, NYHA III.  Etiology A-fib, hypertension, obesity  Dry weight 335 pounds, weight today 349 lb.  proBNP 245, previously in the 90-160s.  Echo 4/2023 EF of 50%, grade 2 diastolic dysfunction.  Diuretic regimen Bumex 6 mg twice daily, spironolactone 25 mg daily, metolazone 2.5 mg as needed and Jardiance 10 mg daily.  Acute on chronic exacerbation, not responding to outpatient treatment.  Bumex gtt as per heart failure  Continue spironolactone, Jardiance.  Continue potassium supplementation with diuretics.  Monitor BMP bid  Strict ins and outs, daily standing weights, low-sodium diet and 1800 mL fluid restriction.  Heart failure recommendations appreciated  Patient continued on Bumex drip, will increase potassium supplementation, supplement magnesium as well, twice daily BMP  Patient's weight down to 339 pounds with good urine output, continued on Bumex as per heart failure    Diabetic neuropathy (HCC)  Assessment & Plan  PDMP reviewed, continue Lyrica.  (P) [id number]      Paroxysmal atrial fibrillation (HCC)  Assessment & Plan  Continue metoprolol succinate 50 mg daily.  Continue Eliquis for anticoagulation.    Severe persistent asthma  Assessment & Plan  Not currently in exacerbation.  Continue albuterol, Symbicort and Spiriva equivalent.    Hyperlipidemia  Assessment & Plan  Continue home atorvastatin.    Type 2 diabetes mellitus with hyperglycemia, without long-term current use of  insulin (HCC)  Assessment & Plan  Lab Results   Component Value Date    HGBA1C 7.1 (A) 2023       Recent Labs     23  0814 23  1203 23  1657 23  0815   POCGLU 217* 289* 280* 223*         Blood Sugar Average: Last 72 hrs:  (P) [id number]  At home, on empagliflozin and Sitagliptin.  Hold all oral meds including empagliflozin  Hold Sitagliptin.  Correctional insulin with meals.  Start low-dose Lantus for improved blood sugar control and adjust as needed         VTE Pharmacologic Prophylaxis:   Pharmacologic: Apixaban (Eliquis)  Mechanical VTE Prophylaxis in Place: No    Patient Centered Rounds: I have performed bedside rounds with nursing staff today.      Time Spent for Care:  55 .  More than 50% of total time spent on counseling and coordination of care as described above.    Current Length of Stay: 4 day(s)    Current Patient Status: Inpatient       Code Status: Level 1 - Full Code      Subjective:   NAD    Objective:     Vitals:   Temp (24hrs), Av.4 °F (36.9 °C), Min:97.6 °F (36.4 °C), Max:98.9 °F (37.2 °C)    Temp:  [97.6 °F (36.4 °C)-98.9 °F (37.2 °C)] 98.2 °F (36.8 °C)  HR:  [] 103  Resp:  [16-17] 16  BP: ()/(44-74) 104/55  SpO2:  [93 %-97 %] 96 %  Body mass index is 44.62 kg/m².     Input and Output Summary (last 24 hours):       Intake/Output Summary (Last 24 hours) at 2023 1023  Last data filed at 2023 0951  Gross per 24 hour   Intake 2160 ml   Output 3200 ml   Net -1040 ml       Physical Exam:     Physical Exam  HENT:      Head: Normocephalic and atraumatic.   Cardiovascular:      Rate and Rhythm: Regular rhythm.   Pulmonary:      Effort: No respiratory distress.   Musculoskeletal:         General: Swelling present.   Psychiatric:         Mood and Affect: Mood normal.         Additional Data:     Labs:    Results from last 7 days   Lab Units 23  0546   WBC Thousand/uL 5.92   HEMOGLOBIN g/dL 9.3*   HEMATOCRIT % 31.0*   PLATELETS  Thousands/uL 253   NEUTROS PCT % 63   LYMPHS PCT % 17   MONOS PCT % 10   EOS PCT % 7*     Results from last 7 days   Lab Units 12/27/23  0818 12/24/23  0443 12/23/23  1515   POTASSIUM mmol/L 3.6   < > 3.7   CHLORIDE mmol/L 95*   < > 101   CO2 mmol/L 31   < > 22   BUN mg/dL 18   < > 11   CREATININE mg/dL 1.83*   < > 1.13   CALCIUM mg/dL 7.6*   < > 8.0*   ALK PHOS U/L  --   --  105*   ALT U/L  --   --  9   AST U/L  --   --  18    < > = values in this interval not displayed.           Recent Cultures (last 7 days):           Last 24 Hours Medication List:   Current Facility-Administered Medications   Medication Dose Route Frequency Provider Last Rate    albuterol  2 puff Inhalation Q4H PRN William Stahl MD      apixaban  5 mg Oral BID William Stahl MD      atorvastatin  10 mg Oral Daily William Stahl MD      budesonide-formoterol  2 puff Inhalation BID William Stahl MD      bumetanide (BUMEX) 12.5 mg infusion 50 mL  0.5 mg/hr Intravenous Continuous William Stahl MD 0.5 mg/hr (12/26/23 0030)    Empagliflozin  10 mg Oral Daily Puma Jarquin PA-C      insulin glargine  6 Units Subcutaneous HS Faheem Banda DO      insulin lispro  1-6 Units Subcutaneous TID AC William Stahl MD      metoprolol succinate  50 mg Oral Daily William Sthal MD      pantoprazole  40 mg Oral Daily William Stahl MD      potassium chloride  40 mEq Oral BID William Stahl MD      pregabalin  50 mg Oral HS William Stahl MD      sacubitril-valsartan  1 tablet Oral BID Christiano Nelson DO      spironolactone  25 mg Oral Daily William Stahl MD      umeclidinium  1 puff Inhalation Daily William Stahl MD          Today, Patient Was Seen By: Cheryle Kumar DO    ** Please Note: Dictation voice to text software may have been used in the creation of this document. **

## 2023-12-27 NOTE — CASE MANAGEMENT
Case Management Assessment & Discharge Planning Note    Patient name Mesfin Cano  Location /-01 MRN [id number]  : 1965 Date 2023       Current Admission Date: 2023  Current Admission Diagnosis:Acute on chronic heart failure with preserved ejection fraction (HFpEF) ([facility])   Patient Active Problem List    Diagnosis Date Noted    Diabetic neuropathy ([facility]) 2023    Chronic renal disease, stage IV ([facility]) 2023    Eosinophilia 10/18/2023    Anemia of chronic disease 10/16/2023    Nasal congestion 10/06/2023    Loose stools 2023    Chronic diastolic CHF ([facility]) 04/10/2023    Diabetic polyneuropathy associated with type 2 diabetes mellitus ([facility]) 2022    CKD (chronic kidney disease) stage 3 ([facility]) 2022    Presence of CardioMEMS HF system 2022    Paroxysmal atrial fibrillation ([facility]) 2022    Acute on chronic heart failure with preserved ejection fraction (HFpEF) ([facility]) 2021    Severe persistent asthma 10/11/2021    Hyponatremia 2021    HNP (herniated nucleus pulposus), lumbar 2021    Foraminal stenosis of lumbar region 2021    VICKY (obstructive sleep apnea)     Hyperlipidemia 2019    Primary osteoarthritis of both knees 2018    Irritable bowel syndrome with diarrhea 2018    Primary hypertension 2018    Type 2 diabetes mellitus with hyperglycemia, without long-term current use of insulin ([facility]) 2018    Vitamin B12 deficiency 2016    Vitamin D deficiency 2016    Morbid obesity ([facility]) 2016    Primary osteoarthritis of right knee 10/15/2013      LOS (days): 4  Geometric Mean LOS (GMLOS) (days):   Days to GMLOS:     OBJECTIVE:  PATIENT READMITTED TO HOSPITAL  Risk of Unplanned Readmission Score: 38.65         Current admission status: Inpatient       Preferred Pharmacy:   [id number] - [place], [place] [address]  [place] [address]  Phone: [phone] Fax:  [phone]    Primary Care Provider: Soo Chavez MD    Primary Insurance: AET[employer]  Secondary Insurance:     ASSESSMENT:  Active Health Care Proxies       Karina Cano Health Care Representative - Spouse   Primary Phone: [phone] (Home)  Mobile Phone: [phone]                 Advance Directives  Does patient have a Health Care POA?: No  Was patient offered paperwork?: Yes (declined. Reports \"in the works\")  Does patient currently have a Health Care decision maker?: Yes, please see Health Care Proxy section  Does patient have Advance Directives?: No  Was patient offered paperwork?: Yes (declined. Reports \"in the works\")  Primary Contact: wife, Karina Cano [phone]         Readmission Root Cause  30 Day Readmission: Yes  Who directed you to return to the hospital?: Self  Did you understand whom to contact if you had questions or problems?: Yes  Did you get your prescriptions before you left the hospital?: Yes  Were you able to get your prescriptions filled when you left the hospital?: Yes  Did you take your medications as prescribed?: Yes  Were you able to get to your follow-up appointments?: Yes  During previous admission, was a post-acute recommendation made?: No  Patient was readmitted due to: CHF  Action Plan: IV Bumex    Patient Information  Admitted from:: Home  Mental Status: Alert  During Assessment patient was accompanied by: Not accompanied during assessment  Assessment information provided by:: Patient  Support Systems: Self, Spouse/significant other, Daughter  County of Residence: [place]  What city do you live in?: [place]  Home entry access options. Select all that apply.: Stairs  Number of steps to enter home.: 6  Do the steps have railings?: Yes  Type of Current Residence: 2 story home  Upon entering residence, is there a bedroom on the main floor (no further steps)?: No  A bedroom is located on the following floor levels of residence (select all that apply):: 2nd Floor  Upon " entering residence, is there a bathroom on the main floor (no further steps)?: No  Indicate which floors of current residence have a bathroom (select all the apply):: 2nd Floor  Number of steps to 2nd floor from main floor: One Flight  Living Arrangements: Lives w/ Spouse/significant other, Lives w/ Daughter    Activities of Daily Living Prior to Admission  Functional Status: Independent  Completes ADLs independently?: Yes  Ambulates independently?: Yes  Does patient use assisted devices?: No  Does patient currently own DME?: No  Does patient have a history of Outpatient Therapy (PT/OT)?: No  Does the patient have a history of Short-Term Rehab?: No  Does patient have a history of HHC?: No  Does patient currently have HHC?: No         Patient Information Continued  Income Source: Unemployed (re-applying for disability)  Does patient have prescription coverage?: Yes  Does patient receive dialysis treatments?: No  Does patient have a history of substance abuse?: No  Does patient have a history of Mental Health Diagnosis?: No         Means of Transportation  Means of Transport to Appts:: Drives Self      Housing Stability: High Risk (12/27/2023)    Housing Stability Vital Sign     Unable to Pay for Housing in the Last Year: Yes     Number of Places Lived in the Last Year: 1     Unstable Housing in the Last Year: No   Food Insecurity: No Food Insecurity (12/27/2023)    Hunger Vital Sign     Worried About Running Out of Food in the Last Year: Never true     Ran Out of Food in the Last Year: Never true   Transportation Needs: No Transportation Needs (12/27/2023)    PRAPARE - Transportation     Lack of Transportation (Medical): No     Lack of Transportation (Non-Medical): No   Utilities: Not At Risk (12/27/2023)    [facility] Utilities     Threatened with loss of utilities: No       DISCHARGE DETAILS:    Discharge planning discussed with:: patient        CM contacted family/caregiver?: No- see comments (declined)              Contacts  Patient Contacts: patient  Contact Method: In Person  Reason/Outcome: Emergency Contact, Discharge Planning, Continuity of Care         Other Referral/Resources/Interventions Provided:  Interventions: Other (Specify) (resources for shelter, food & utlities assistance)          Additional Comments: CM met with patient to introduce CM role, obtain baseline assessment information and discuss DCP. Pt lives w/ wife and dtr in 2 story home. IPTA. Drives. re-applying for disability. Entresto price checked by Cardiology PA. $50/mth and pt reports he can afford.CM will follow for d/c needs.

## 2023-12-27 NOTE — PLAN OF CARE
Problem: PAIN - ADULT  Goal: Verbalizes/displays adequate comfort level or baseline comfort level  Description: Interventions:  - Encourage patient to monitor pain and request assistance  - Assess pain using appropriate pain scale  - Administer analgesics based on type and severity of pain and evaluate response  - Implement non-pharmacological measures as appropriate and evaluate response  - Consider cultural and social influences on pain and pain management  - Notify physician/advanced practitioner if interventions unsuccessful or patient reports new pain  Outcome: Progressing     Problem: INFECTION - ADULT  Goal: Absence or prevention of progression during hospitalization  Description: INTERVENTIONS:  - Assess and monitor for signs and symptoms of infection  - Monitor lab/diagnostic results  - Monitor all insertion sites, i.e. indwelling lines, tubes, and drains  - Monitor endotracheal if appropriate and nasal secretions for changes in amount and color  - [place] appropriate cooling/warming therapies per order  - Administer medications as ordered  - Instruct and encourage patient and family to use good hand hygiene technique  - Identify and instruct in appropriate isolation precautions for identified infection/condition  Outcome: Progressing  Goal: Absence of fever/infection during neutropenic period  Description: INTERVENTIONS:  - Monitor WBC    Outcome: Progressing

## 2023-12-27 NOTE — ASSESSMENT & PLAN NOTE
Lab Results   Component Value Date    HGBA1C 7.1 (A) 08/09/2023       Recent Labs     12/26/23  0814 12/26/23  1203 12/26/23  1657 12/27/23  0815   POCGLU 217* 289* 280* 223*         Blood Sugar Average: Last 72 hrs:  (P) [id number]  At home, on empagliflozin and Sitagliptin.  Hold all oral meds including empagliflozin  Hold Sitagliptin.  Correctional insulin with meals.  Start low-dose Lantus for improved blood sugar control and adjust as needed

## 2023-12-27 NOTE — PROGRESS NOTES
"Cardiology Progress Note - Mesfin Cano 58 y.o. male MRN: [id number]    Unit/Bed#: -01 Encounter: [id number]      Assessment/Plan:   Acute on chronic HFpEF-EF 50%, s/p CardioMEMS implant 3/9/2022  Last Echo 4/2023  CardioMEMS: Goal 15.  PAD -15 at recent office visit 12/15, at that time up from 12-14 over the previous days  Intake/output: 1500/3200: -1700  Weight: 338lbs (Standing), no significant change from 337lbs yesterday. Dry weight 335 lbs.   Still appears hypervolemic on exam, some improvement since yesterday  Cr increased from 1.80 to 2.08. Continue to monitor. Bumex adjusted.   Discontinue Bumex drip and resume home dose of Bumex 6 mg p.o. twice daily   Continue Jardiance and Entresto   Continue 2 g sodium diet, 1800 mL fluid restriction, daily weights, strict I's and O's  Continue telemetry     2.  Paroxysmal atrial fibrillation  In NSR on tele. Continue tele  Continue Toprol, Eliquis.  [id number] 3    3.  Hypertension  Continue Toprol, spironolactone, Entresto.  Continue to monitor BP's    4.  Hyperlipidemia  Continue statin    5.  Type II DM  Management per primary team    6.  VICKY  Recommend outpatient follow-up and regular CPAP use once able to obtain CPAP machine    7.  CKD  Management per primary team    8.  Severe persistent asthma  Management per primary team    9.  History of tobacco use    Subjective:   Patient seen and examined.  No significant events overnight.  States his lower extremity edema and abdominal bloating have decreased since yesterday.  States he is still having some shortness of breath with exertion, same as yesterday.  Denies chest pain, palpitations, lightheadedness, dizziness    Objective:     Vitals: Blood pressure 108/58, pulse 85, temperature 98 °F (36.7 °C), resp. rate 18, height 6' 1\" (1.854 m), weight (!) 153 kg (338 lb 3 oz), SpO2 96%., Body mass index is 44.62 kg/m².,   Orthostatic Blood Pressures      Flowsheet Row Most Recent Value   Blood Pressure 108/58 filed " at 12/27/2023 1120   Patient Position - Orthostatic VS Lying filed at 12/27/2023 0303              Intake/Output Summary (Last 24 hours) at 12/27/2023 1331  Last data filed at 12/27/2023 1316  Gross per 24 hour   Intake 1980 ml   Output 3025 ml   Net -1045 ml         Physical Exam:   GEN: Alert and oriented x 3, in no acute distress. Well appearing. Obese body habitus.   HEENT: Sclera anicteric, conjunctivae pink, mucous membranes moist. Oropharynx clear.   NECK: Supple, no significant JVD. Trachea midline, no thyromegaly.   HEART: Regular rhythm, normal S1 and S2, no murmurs, clicks, gallops or rubs. PMI nondisplaced, no thrills.   LUNGS: Clear to auscultation bilaterally; no wheezes, rales, or rhonchi. No increased work of breathing or signs of respiratory distress.   ABDOMEN: Soft, nontender, non-distended.   EXTREMITIES: 1+ pitting edema to B/L LE. Skin warm and well perfused, no clubbing or cyanosis.  NEURO: No focal findings. Normal speech. Mood and affect normal.   SKIN: Normal without suspicious lesions on exposed skin.      Telemetry:  Telemetry Orders (From admission, onward)               24 Hour Telemetry Monitoring  Continuous x 24 Hours ([place])        Question:  Reason for 24 Hour Telemetry  Answer:  Decompensated CHF- and any one of the following: continuous diuretic infusion or total diuretic dose >200 mg daily, associated electrolyte derangement (I.e. K < 3.0), ionotropic drip (continuous infusion), hx of ventricular arrhythmia, or new EF < 35%                     Telemetry Reviewed: Normal Sinus Rhythm and PACs      Medications:      Current Facility-Administered Medications:     albuterol (PROVENTIL HFA,VENTOLIN HFA) inhaler 2 puff, 2 puff, Inhalation, Q4H PRN, William Stahl MD    apixaban (ELIQUIS) tablet 5 mg, 5 mg, Oral, BID, William Stahl MD, 5 mg at 12/27/23 0909    atorvastatin (LIPITOR) tablet 10 mg, 10 mg, Oral, Daily, William Stahl MD, 10 mg at 12/27/23 0909    budesonide-formoterol (SYMBICORT)  160-4.5 mcg/act inhaler 2 puff, 2 puff, Inhalation, BID, William Stahl MD, 2 puff at 12/27/23 0917    bumetanide (BUMEX) tablet 6 mg, 6 mg, Oral, BID, Puma Jarquin PA-C    Empagliflozin (JARDIANCE) tablet 10 mg, 10 mg, Oral, Daily, Puma Jarquin PA-C, 10 mg at 12/27/23 0917    insulin glargine (LANTUS) subcutaneous injection 6 Units 0.06 mL, 6 Units, Subcutaneous, HS, Faheem Banda DO, 6 Units at 12/26/23 2224    insulin lispro (HumaLOG) 100 units/mL subcutaneous injection 1-6 Units, 1-6 Units, Subcutaneous, TID AC, 4 Units at 12/27/23 1222 **AND** Fingerstick Glucose (POCT), , , TID AC, William Stahl MD    metoprolol succinate (TOPROL-XL) 24 hr tablet 50 mg, 50 mg, Oral, Daily, William Stahl MD, 50 mg at 12/26/23 0845    pantoprazole (PROTONIX) EC tablet 40 mg, 40 mg, Oral, Daily, William Stahl MD, 40 mg at 12/27/23 0909    potassium chloride (K-DUR,KLOR-CON) CR tablet 40 mEq, 40 mEq, Oral, BID, William Stahl MD, 40 mEq at 12/27/23 0909    pregabalin (LYRICA) capsule 50 mg, 50 mg, Oral, HS, William Stahl MD, 50 mg at 12/26/23 2224    sacubitril-valsartan (ENTRESTO) 24-26 MG per tablet 1 tablet, 1 tablet, Oral, BID, Christiano Nelson DO, 1 tablet at 12/26/23 2225    spironolactone (ALDACTONE) tablet 25 mg, 25 mg, Oral, Daily, William Stahl MD, 25 mg at 12/26/23 0845    umeclidinium 62.5 mcg/actuation inhaler AEPB 1 puff, 1 puff, Inhalation, Daily, William Stahl MD, 1 puff at 12/27/23 0917     Labs & Results:        Results from last 7 days   Lab Units 12/26/23  0546 12/24/23  0443 12/23/23  1515   WBC Thousand/uL 5.92 5.83 6.59   HEMOGLOBIN g/dL 9.3* 9.8* 10.5*   HEMATOCRIT % 31.0* 31.3* 34.1*   PLATELETS Thousands/uL 253 235 243         Results from last 7 days   Lab Units 12/27/23  0818 12/26/23  1820 12/26/23  0900 12/24/23  0443 12/23/23  1515   POTASSIUM mmol/L 3.6 4.2 4.6   < > 3.7   CHLORIDE mmol/L 95* 95* 96   < > 101   CO2 mmol/L 31 27 27   < > 22   BUN mg/dL 18 18 15   < > 11   CREATININE mg/dL 1.83* 2.08*  1.80*   < > 1.13   CALCIUM mg/dL 7.6* 7.5* 7.3*   < > 8.0*   ALK PHOS U/L  --   --   --   --  105*   ALT U/L  --   --   --   --  9   AST U/L  --   --   --   --  18    < > = values in this interval not displayed.         Results from last 7 days   Lab Units 12/26/23  0546 12/24/23  0443   MAGNESIUM mg/dL 2.5 1.7*

## 2023-12-28 ENCOUNTER — PATIENT OUTREACH (OUTPATIENT)
Dept: CARDIOLOGY CLINIC | Facility: CLINIC | Age: 58
End: 2023-12-28

## 2023-12-28 LAB
ANION GAP SERPL CALCULATED.3IONS-SCNC: 12 MMOL/L
ANION GAP SERPL CALCULATED.3IONS-SCNC: 9 MMOL/L
BUN SERPL-MCNC: 23 MG/DL (ref 5–25)
BUN SERPL-MCNC: 26 MG/DL (ref 5–25)
CALCIUM SERPL-MCNC: 7.6 MG/DL (ref 8.4–10.2)
CALCIUM SERPL-MCNC: 8.6 MG/DL (ref 8.4–10.2)
CHLORIDE SERPL-SCNC: 94 MMOL/L (ref 96–108)
CHLORIDE SERPL-SCNC: 97 MMOL/L (ref 96–108)
CO2 SERPL-SCNC: 25 MMOL/L (ref 21–32)
CO2 SERPL-SCNC: 30 MMOL/L (ref 21–32)
CREAT SERPL-MCNC: 1.87 MG/DL (ref 0.6–1.3)
CREAT SERPL-MCNC: 2 MG/DL (ref 0.6–1.3)
EST. AVERAGE GLUCOSE BLD GHB EST-MCNC: 220 MG/DL
GFR SERPL CREATININE-BSD FRML MDRD: 36 ML/MIN/1.73SQ M
GFR SERPL CREATININE-BSD FRML MDRD: 39 ML/MIN/1.73SQ M
GLUCOSE SERPL-MCNC: 204 MG/DL (ref 65–140)
GLUCOSE SERPL-MCNC: 221 MG/DL (ref 65–140)
GLUCOSE SERPL-MCNC: 230 MG/DL (ref 65–140)
GLUCOSE SERPL-MCNC: 280 MG/DL (ref 65–140)
GLUCOSE SERPL-MCNC: 345 MG/DL (ref 65–140)
GLUCOSE SERPL-MCNC: 347 MG/DL (ref 65–140)
HBA1C MFR BLD: 9.3 %
MISCELLANEOUS LAB TEST RESULT: NORMAL
POTASSIUM SERPL-SCNC: 3.6 MMOL/L (ref 3.5–5.3)
POTASSIUM SERPL-SCNC: 4.3 MMOL/L (ref 3.5–5.3)
SODIUM SERPL-SCNC: 131 MMOL/L (ref 135–147)
SODIUM SERPL-SCNC: 136 MMOL/L (ref 135–147)

## 2023-12-28 PROCEDURE — 99232 SBSQ HOSP IP/OBS MODERATE 35: CPT | Performed by: INTERNAL MEDICINE

## 2023-12-28 PROCEDURE — 80048 BASIC METABOLIC PNL TOTAL CA: CPT | Performed by: INTERNAL MEDICINE

## 2023-12-28 PROCEDURE — 83036 HEMOGLOBIN GLYCOSYLATED A1C: CPT

## 2023-12-28 PROCEDURE — 82948 REAGENT STRIP/BLOOD GLUCOSE: CPT

## 2023-12-28 PROCEDURE — 99233 SBSQ HOSP IP/OBS HIGH 50: CPT | Performed by: INTERNAL MEDICINE

## 2023-12-28 PROCEDURE — 3046F HEMOGLOBIN A1C LEVEL >9.0%: CPT | Performed by: NURSE PRACTITIONER

## 2023-12-28 RX ADMIN — PANTOPRAZOLE SODIUM 40 MG: 40 TABLET, DELAYED RELEASE ORAL at 08:27

## 2023-12-28 RX ADMIN — POTASSIUM CHLORIDE 40 MEQ: 1500 TABLET, EXTENDED RELEASE ORAL at 17:46

## 2023-12-28 RX ADMIN — INSULIN GLARGINE 6 UNITS: 100 INJECTION, SOLUTION SUBCUTANEOUS at 21:13

## 2023-12-28 RX ADMIN — SACUBITRIL AND VALSARTAN 1 TABLET: 24; 26 TABLET, FILM COATED ORAL at 17:54

## 2023-12-28 RX ADMIN — METOPROLOL SUCCINATE 50 MG: 50 TABLET, EXTENDED RELEASE ORAL at 08:27

## 2023-12-28 RX ADMIN — INSULIN LISPRO 3 UNITS: 100 INJECTION, SOLUTION INTRAVENOUS; SUBCUTANEOUS at 17:54

## 2023-12-28 RX ADMIN — ATORVASTATIN CALCIUM 10 MG: 10 TABLET, FILM COATED ORAL at 08:27

## 2023-12-28 RX ADMIN — EMPAGLIFLOZIN 10 MG: 10 TABLET, FILM COATED ORAL at 08:27

## 2023-12-28 RX ADMIN — BUDESONIDE AND FORMOTEROL FUMARATE DIHYDRATE 2 PUFF: 160; 4.5 AEROSOL RESPIRATORY (INHALATION) at 08:29

## 2023-12-28 RX ADMIN — SACUBITRIL AND VALSARTAN 1 TABLET: 24; 26 TABLET, FILM COATED ORAL at 08:27

## 2023-12-28 RX ADMIN — APIXABAN 5 MG: 5 TABLET, FILM COATED ORAL at 08:27

## 2023-12-28 RX ADMIN — BUMETANIDE 6 MG: 2 TABLET ORAL at 08:27

## 2023-12-28 RX ADMIN — INSULIN LISPRO 5 UNITS: 100 INJECTION, SOLUTION INTRAVENOUS; SUBCUTANEOUS at 12:29

## 2023-12-28 RX ADMIN — PREGABALIN 50 MG: 50 CAPSULE ORAL at 21:13

## 2023-12-28 RX ADMIN — INSULIN LISPRO 2 UNITS: 100 INJECTION, SOLUTION INTRAVENOUS; SUBCUTANEOUS at 08:33

## 2023-12-28 RX ADMIN — APIXABAN 5 MG: 5 TABLET, FILM COATED ORAL at 17:46

## 2023-12-28 RX ADMIN — SPIRONOLACTONE 25 MG: 25 TABLET, FILM COATED ORAL at 08:27

## 2023-12-28 RX ADMIN — BUDESONIDE AND FORMOTEROL FUMARATE DIHYDRATE 2 PUFF: 160; 4.5 AEROSOL RESPIRATORY (INHALATION) at 17:54

## 2023-12-28 RX ADMIN — POTASSIUM CHLORIDE 40 MEQ: 1500 TABLET, EXTENDED RELEASE ORAL at 08:25

## 2023-12-28 RX ADMIN — BUMETANIDE 6 MG: 2 TABLET ORAL at 17:54

## 2023-12-28 RX ADMIN — UMECLIDINIUM 1 PUFF: 62.5 AEROSOL, POWDER ORAL at 08:29

## 2023-12-28 NOTE — PROGRESS NOTES
"Cardiology Progress Note - Mesfin Cano 58 y.o. male MRN: [id number]    Unit/Bed#: -01 Encounter: [id number]      Assessment/Plan:   Acute on chronic HFpEF-EF 50%, s/p CardioMEMS implant 3/9/2022  Last echo 4/2023  CardioMEMS: Goal 15.  PAD range from 12-18 in the days leading up to admission.  Intake/output: 1290/1275: +15  Weight: 338 pounds (no significant change from 338 pounds yesterday.  Reports dry weight of 335 pounds.  Appears slightly hypervolemic on exam still, continuing to improve  Cr 1.87 today, yesterday 1.83-1.99   Continue home dose of Bumex 6 mg p.o. twice daily  Continue Jardiance, Entresto, spironolactone  Continue 2 g sodium diet, 1800 mL fluid restriction, daily weights, strict I's and O's  Continue telemetry  Anticipated discharge tomorrow pending kidney function and volume status    2.  Paroxysmal atrial fibrillation  In NSR on telemetry.  Continue telemetry  Continue Toprol, Eliquis.  [id number] 3    3.  Hypertension  BP stable.  Continue spironolactone, Entresto.    4.  Hyperlipidemia  Continue statin    5.  Type II DM  A1c today: 9.3. (Up from previous 8/2023: 7.1)  Management per primary team    6.  VICKY  Recommend outpatient follow-up and regular CPAP use once able to obtain CPAP machine    7.  CKD  Management per primary team    8.  Severe persistent asthma  Management per primary team    9.  History of tobacco use    Subjective:   Patient seen and examined.  No significant events overnight.  Patient states he is overall feeling better compared to yesterday.  States he still experiencing some mild SOB with exertion but this is improved compared to yesterday.  States lower extremity edema and abdominal bloating continues to decrease.  Denies chest pain, lightheadedness, dizziness.    Objective:     Vitals: Blood pressure 111/66, pulse 90, temperature 98.4 °F (36.9 °C), temperature source Oral, resp. rate 18, height 6' 1\" (1.854 m), weight (!) 154 kg (338 lb 13.6 oz), SpO2 94%., " Body mass index is 44.71 kg/m².,   Orthostatic Blood Pressures      Flowsheet Row Most Recent Value   Blood Pressure 111/66 filed at 2023 1119   Patient Position - Orthostatic VS Lying filed at 2023 0830              Intake/Output Summary (Last 24 hours) at 2023 1412  Last data filed at 2023 1300  Gross per 24 hour   Intake 810 ml   Output 2575 ml   Net -1765 ml         Physical Exam:   GEN: Alert and oriented x 3, in no acute distress.  Well appearing.  Obese body habitus.   HEENT: Sclera anicteric, conjunctivae pink, mucous membranes moist. Oropharynx clear.   NECK: Supple, no significant JVD. Trachea midline, no thyromegaly.   HEART: Regular rhythm, normal S1 and S2, no murmurs, clicks, gallops or rubs. PMI nondisplaced, no thrills.   LUNGS: Clear to auscultation bilaterally; no wheezes, rales, or rhonchi. No increased work of breathing or signs of respiratory distress.   ABDOMEN: Soft, nontender, non-distended.   EXTREMITIES: Bilateral lower extremity 1+ pitting edema. Skin warm and well perfused, no clubbing or cyanosis.  NEURO: No focal findings. Normal speech. Mood and affect normal.   SKIN: Normal without suspicious lesions on exposed skin.      Telemetry:  Telemetry Orders (From admission, onward)               24 Hour Telemetry Monitoring  Continuous x 24 Hours (Telem)           Question:  Reason for 24 Hour Telemetry  Answer:  Decompensated CHF- and any one of the following: continuous diuretic infusion or total diuretic dose >200 mg daily, associated electrolyte derangement (I.e. K < 3.0), ionotropic drip (continuous infusion), hx of ventricular arrhythmia, or new EF < 35%                     Telemetry Reviewed: Normal Sinus Rhythm and PVCs      Medications:      Current Facility-Administered Medications:     albuterol (PROVENTIL HFA,VENTOLIN HFA) inhaler 2 puff, 2 puff, Inhalation, Q4H PRN, William Stahl MD    apixaban (ELIQUIS) tablet 5 mg, 5 mg, Oral, BID, William Stahl MD,  5 mg at 12/28/23 0827    atorvastatin (LIPITOR) tablet 10 mg, 10 mg, Oral, Daily, William Stahl MD, 10 mg at 12/28/23 0827    budesonide-formoterol (SYMBICORT) 160-4.5 mcg/act inhaler 2 puff, 2 puff, Inhalation, BID, William Stahl MD, 2 puff at 12/28/23 0829    bumetanide (BUMEX) tablet 6 mg, 6 mg, Oral, BID, Puma Jarquin PA-C, 6 mg at 12/28/23 0827    Empagliflozin (JARDIANCE) tablet 10 mg, 10 mg, Oral, Daily, Puma Jarquin PA-C, 10 mg at 12/28/23 0827    insulin glargine (LANTUS) subcutaneous injection 6 Units 0.06 mL, 6 Units, Subcutaneous, HS, Faheem Banda DO, 6 Units at 12/27/23 2231    insulin lispro (HumaLOG) 100 units/mL subcutaneous injection 1-6 Units, 1-6 Units, Subcutaneous, TID AC, 5 Units at 12/28/23 1229 **AND** Fingerstick Glucose (POCT), , , TID AC, William Stahl MD    metoprolol succinate (TOPROL-XL) 24 hr tablet 50 mg, 50 mg, Oral, Daily, William Stahl MD, 50 mg at 12/28/23 0827    pantoprazole (PROTONIX) EC tablet 40 mg, 40 mg, Oral, Daily, William Stahl MD, 40 mg at 12/28/23 0827    potassium chloride (K-DUR,KLOR-CON) CR tablet 40 mEq, 40 mEq, Oral, BID, William Stahl MD, 40 mEq at 12/28/23 0825    pregabalin (LYRICA) capsule 50 mg, 50 mg, Oral, HS, William Stahl MD, 50 mg at 12/27/23 2231    sacubitril-valsartan (ENTRESTO) 24-26 MG per tablet 1 tablet, 1 tablet, Oral, BID, Christiano Nelson DO, 1 tablet at 12/28/23 0827    spironolactone (ALDACTONE) tablet 25 mg, 25 mg, Oral, Daily, William Stahl MD, 25 mg at 12/28/23 0827    umeclidinium 62.5 mcg/actuation inhaler AEPB 1 puff, 1 puff, Inhalation, Daily, William Stahl MD, 1 puff at 12/28/23 0829     Labs & Results:        Results from last 7 days   Lab Units 12/26/23  0546 12/24/23  0443 12/23/23  1515   WBC Thousand/uL 5.92 5.83 6.59   HEMOGLOBIN g/dL 9.3* 9.8* 10.5*   HEMATOCRIT % 31.0* 31.3* 34.1*   PLATELETS Thousands/uL 253 235 243         Results from last 7 days   Lab Units 12/28/23  0818 12/27/23  1805 12/27/23  0818 12/24/23  0443  12/23/23  1515   POTASSIUM mmol/L 3.6 4.0 3.6   < > 3.7   CHLORIDE mmol/L 97 94* 95*   < > 101   CO2 mmol/L 30 27 31   < > 22   BUN mg/dL 23 23 18   < > 11   CREATININE mg/dL 1.87* 1.99* 1.83*   < > 1.13   CALCIUM mg/dL 7.6* 7.8* 7.6*   < > 8.0*   ALK PHOS U/L  --   --   --   --  105*   ALT U/L  --   --   --   --  9   AST U/L  --   --   --   --  18    < > = values in this interval not displayed.         Results from last 7 days   Lab Units 12/26/23  0546 12/24/23  0443   MAGNESIUM mg/dL 2.5 1.7*

## 2023-12-28 NOTE — ASSESSMENT & PLAN NOTE
Lab Results   Component Value Date    HGBA1C 9.3 (H) 12/28/2023       Recent Labs     12/27/23  1657 12/27/23  2124 12/28/23  0821 12/28/23  1142   POCGLU 240* 291* 221* 345*         Blood Sugar Average: Last 72 hrs:  (P) 254.7212415826678297  At home, on empagliflozin and Sitagliptin.  Hold all oral meds including empagliflozin  Hold Sitagliptin.  Correctional insulin with meals.  Start low-dose Lantus for improved blood sugar control and adjust as needed

## 2023-12-28 NOTE — PROGRESS NOTES
Patient admitted to [facility]; Advanced Heart Failure Census.     Outpatient Advanced Heart Failure LCSW completed electronic chart review and rounded with the HF Team. Team discussed today's plan with patient.    Referral for outpatient social work not entered at this time.   Please enter referral if outpatient resources are needed in the future.

## 2023-12-28 NOTE — ASSESSMENT & PLAN NOTE
Wt Readings from Last 3 Encounters:   12/28/23 (!) 154 kg (338 lb 13.6 oz)   12/15/23 (!) 157 kg (346 lb)   12/13/23 (!) 157 kg (346 lb 3.2 oz)     Acute on chronic heart failure with preserved EF. Stage C, NYHA III.  Etiology A-fib, hypertension, obesity  Dry weight 335 pounds, weight today 349 lb.  proBNP 245, previously in the 90-160s.  Echo 4/2023 EF of 50%, grade 2 diastolic dysfunction.  Diuretic regimen Bumex 6 mg twice daily, spironolactone 25 mg daily, metolazone 2.5 mg as needed and Jardiance 10 mg daily.  Acute on chronic exacerbation, not responding to outpatient treatment.  Bumex gtt as per heart failure  Continue spironolactone, Jardiance.  Continue potassium supplementation with diuretics.  Monitor BMP bid  Strict ins and outs, daily standing weights, low-sodium diet and 1800 mL fluid restriction.  Heart failure recommendations appreciated  Patient continued on Bumex drip, will increase potassium supplementation, supplement magnesium as well, twice daily BMP  Patient's weight down to 339 pounds with good urine output, continued on Bumex as per heart failure

## 2023-12-28 NOTE — PLAN OF CARE
Problem: PAIN - ADULT  Goal: Verbalizes/displays adequate comfort level or baseline comfort level  Description: Interventions:  - Encourage patient to monitor pain and request assistance  - Assess pain using appropriate pain scale  - Administer analgesics based on type and severity of pain and evaluate response  - Implement non-pharmacological measures as appropriate and evaluate response  - Consider cultural and social influences on pain and pain management  - Notify physician/advanced practitioner if interventions unsuccessful or patient reports new pain  Outcome: Progressing     Problem: INFECTION - ADULT  Goal: Absence or prevention of progression during hospitalization  Description: INTERVENTIONS:  - Assess and monitor for signs and symptoms of infection  - Monitor lab/diagnostic results  - Monitor all insertion sites, i.e. indwelling lines, tubes, and drains  - Monitor endotracheal if appropriate and nasal secretions for changes in amount and color  - [place] appropriate cooling/warming therapies per order  - Administer medications as ordered  - Instruct and encourage patient and family to use good hand hygiene technique  - Identify and instruct in appropriate isolation precautions for identified infection/condition  Outcome: Progressing  Goal: Absence of fever/infection during neutropenic period  Description: INTERVENTIONS:  - Monitor WBC    Outcome: Progressing

## 2023-12-28 NOTE — PROGRESS NOTES
[facility]  Progress Note  Name: Mesfin Cano I  MRN: [id number]  Unit/Bed#: -01 I Date of Admission: 12/23/2023   Date of Service: 12/28/2023 I Hospital Day: 5    Assessment/Plan   * Acute on chronic heart failure with preserved ejection fraction (HFpEF) ([facility])  Assessment & Plan  Wt Readings from Last 3 Encounters:   12/28/23 (!) 154 kg (338 lb 13.6 oz)   12/15/23 (!) 157 kg (346 lb)   12/13/23 (!) 157 kg (346 lb 3.2 oz)     Acute on chronic heart failure with preserved EF. Stage C, NYHA III.  Etiology A-fib, hypertension, obesity  Dry weight 335 pounds, weight today 349 lb.  proBNP 245, previously in the 90-160s.  Echo 4/2023 EF of 50%, grade 2 diastolic dysfunction.  Diuretic regimen Bumex 6 mg twice daily, spironolactone 25 mg daily, metolazone 2.5 mg as needed and Jardiance 10 mg daily.  Acute on chronic exacerbation, not responding to outpatient treatment.  Bumex gtt as per heart failure  Continue spironolactone, Jardiance.  Continue potassium supplementation with diuretics.  Monitor BMP bid  Strict ins and outs, daily standing weights, low-sodium diet and 1800 mL fluid restriction.  Heart failure recommendations appreciated  Patient continued on Bumex drip, will increase potassium supplementation, supplement magnesium as well, twice daily BMP  Patient's weight down to 339 pounds with good urine output, continued on Bumex as per heart failure    Diabetic neuropathy (HCC)  Assessment & Plan  PDMP reviewed, continue Lyrica.  (P) [id number]      Paroxysmal atrial fibrillation (HCC)  Assessment & Plan  Continue metoprolol succinate 50 mg daily.  Continue Eliquis for anticoagulation.    Severe persistent asthma  Assessment & Plan  Not currently in exacerbation.  Continue albuterol, Symbicort and Spiriva equivalent.    Hyperlipidemia  Assessment & Plan  Continue home atorvastatin.    Type 2 diabetes mellitus with hyperglycemia, without long-term current use of  insulin (HCC)  Assessment & Plan  Lab Results   Component Value Date    HGBA1C 9.3 (H) 2023       Recent Labs     23  1657 23  2124 23  0821 23  1142   POCGLU 240* 291* 221* 345*         Blood Sugar Average: Last 72 hrs:  (P) 254.2920864616295909  At home, on empagliflozin and Sitagliptin.  Hold all oral meds including empagliflozin  Hold Sitagliptin.  Correctional insulin with meals.  Start low-dose Lantus for improved blood sugar control and adjust as needed           VTE Pharmacologic Prophylaxis:   Pharmacologic: Apixaban (Eliquis)  Mechanical VTE Prophylaxis in Place: No    Patient Centered Rounds: I have performed bedside rounds with nursing staff today.      Time Spent for Care:  55 .  More than 50% of total time spent on counseling and coordination of care as described above.    Current Length of Stay: 5 day(s)    Current Patient Status: Inpatient       Code Status: Level 1 - Full Code      Subjective:   nad    Objective:     Vitals:   Temp (24hrs), Av.3 °F (36.8 °C), Min:98.1 °F (36.7 °C), Max:98.5 °F (36.9 °C)    Temp:  [98.1 °F (36.7 °C)-98.5 °F (36.9 °C)] 98.4 °F (36.9 °C)  HR:  [] 90  Resp:  [17-18] 18  BP: (107-122)/(57-77) 111/66  SpO2:  [93 %-97 %] 94 %  Body mass index is 44.71 kg/m².     Input and Output Summary (last 24 hours):       Intake/Output Summary (Last 24 hours) at 2023 1317  Last data filed at 2023 1300  Gross per 24 hour   Intake 810 ml   Output 2575 ml   Net -1765 ml       Physical Exam:     Physical Exam  Constitutional:       Appearance: Normal appearance.   Cardiovascular:      Rate and Rhythm: Normal rate and regular rhythm.   Pulmonary:      Effort: Pulmonary effort is normal.      Breath sounds: Normal breath sounds.   Abdominal:      General: Abdomen is flat.      Palpations: Abdomen is soft.   Musculoskeletal:         General: No swelling. Normal range of motion.   Neurological:      General: No focal deficit present.       Mental Status: He is alert and oriented to person, place, and time.         Additional Data:     Labs:    Results from last 7 days   Lab Units 12/26/23  0546   WBC Thousand/uL 5.92   HEMOGLOBIN g/dL 9.3*   HEMATOCRIT % 31.0*   PLATELETS Thousands/uL 253   NEUTROS PCT % 63   LYMPHS PCT % 17   MONOS PCT % 10   EOS PCT % 7*     Results from last 7 days   Lab Units 12/28/23  0818 12/24/23  0443 12/23/23  1515   POTASSIUM mmol/L 3.6   < > 3.7   CHLORIDE mmol/L 97   < > 101   CO2 mmol/L 30   < > 22   BUN mg/dL 23   < > 11   CREATININE mg/dL 1.87*   < > 1.13   CALCIUM mg/dL 7.6*   < > 8.0*   ALK PHOS U/L  --   --  105*   ALT U/L  --   --  9   AST U/L  --   --  18    < > = values in this interval not displayed.             Recent Cultures (last 7 days):           Last 24 Hours Medication List:   Current Facility-Administered Medications   Medication Dose Route Frequency Provider Last Rate    albuterol  2 puff Inhalation Q4H PRN William Stahl MD      apixaban  5 mg Oral BID William Stahl MD      atorvastatin  10 mg Oral Daily William Stahl MD      budesonide-formoterol  2 puff Inhalation BID William Stahl MD      bumetanide  6 mg Oral BID Puma Jarquin PA-C      Empagliflozin  10 mg Oral Daily Puma Jarquin PA-C      insulin glargine  6 Units Subcutaneous HS Faheem Banda DO      insulin lispro  1-6 Units Subcutaneous TID AC William Stahl MD      metoprolol succinate  50 mg Oral Daily William Stahl MD      pantoprazole  40 mg Oral Daily William Stahl MD      potassium chloride  40 mEq Oral BID William Stahl MD      pregabalin  50 mg Oral HS William Stahl MD      sacubitril-valsartan  1 tablet Oral BID Christiano Nelson DO      spironolactone  25 mg Oral Daily William Stahl MD      umeclidinium  1 puff Inhalation Daily William Stahl MD          Today, Patient Was Seen By: Cheryle Kumar DO    ** Please Note: Dictation voice to text software may have been used in the creation of this document. **

## 2023-12-29 ENCOUNTER — TRANSITIONAL CARE MANAGEMENT (OUTPATIENT)
Dept: FAMILY MEDICINE CLINIC | Facility: CLINIC | Age: 58
End: 2023-12-29

## 2023-12-29 VITALS
BODY MASS INDEX: 41.75 KG/M2 | WEIGHT: 315 LBS | RESPIRATION RATE: 18 BRPM | DIASTOLIC BLOOD PRESSURE: 59 MMHG | HEART RATE: 82 BPM | TEMPERATURE: 98.3 F | HEIGHT: 73 IN | OXYGEN SATURATION: 95 % | SYSTOLIC BLOOD PRESSURE: 98 MMHG

## 2023-12-29 LAB
ANION GAP SERPL CALCULATED.3IONS-SCNC: 7 MMOL/L
BUN SERPL-MCNC: 25 MG/DL (ref 5–25)
CALCIUM SERPL-MCNC: 7.7 MG/DL (ref 8.4–10.2)
CHLORIDE SERPL-SCNC: 100 MMOL/L (ref 96–108)
CO2 SERPL-SCNC: 30 MMOL/L (ref 21–32)
CREAT SERPL-MCNC: 1.86 MG/DL (ref 0.6–1.3)
GFR SERPL CREATININE-BSD FRML MDRD: 39 ML/MIN/1.73SQ M
GLUCOSE SERPL-MCNC: 200 MG/DL (ref 65–140)
GLUCOSE SERPL-MCNC: 210 MG/DL (ref 65–140)
GLUCOSE SERPL-MCNC: 314 MG/DL (ref 65–140)
POTASSIUM SERPL-SCNC: 3.8 MMOL/L (ref 3.5–5.3)
SODIUM SERPL-SCNC: 137 MMOL/L (ref 135–147)

## 2023-12-29 PROCEDURE — 80048 BASIC METABOLIC PNL TOTAL CA: CPT | Performed by: INTERNAL MEDICINE

## 2023-12-29 PROCEDURE — 99239 HOSP IP/OBS DSCHRG MGMT >30: CPT | Performed by: INTERNAL MEDICINE

## 2023-12-29 PROCEDURE — 82948 REAGENT STRIP/BLOOD GLUCOSE: CPT

## 2023-12-29 PROCEDURE — 99232 SBSQ HOSP IP/OBS MODERATE 35: CPT | Performed by: INTERNAL MEDICINE

## 2023-12-29 RX ORDER — SACUBITRIL AND VALSARTAN 24; 26 MG/1; MG/1
1 TABLET, FILM COATED ORAL 2 TIMES DAILY
Qty: 60 TABLET | Refills: 0 | Status: SHIPPED | OUTPATIENT
Start: 2023-12-29

## 2023-12-29 RX ADMIN — SACUBITRIL AND VALSARTAN 1 TABLET: 24; 26 TABLET, FILM COATED ORAL at 09:16

## 2023-12-29 RX ADMIN — INSULIN LISPRO 5 UNITS: 100 INJECTION, SOLUTION INTRAVENOUS; SUBCUTANEOUS at 12:12

## 2023-12-29 RX ADMIN — SPIRONOLACTONE 25 MG: 25 TABLET, FILM COATED ORAL at 09:17

## 2023-12-29 RX ADMIN — EMPAGLIFLOZIN 10 MG: 10 TABLET, FILM COATED ORAL at 09:16

## 2023-12-29 RX ADMIN — PANTOPRAZOLE SODIUM 40 MG: 40 TABLET, DELAYED RELEASE ORAL at 09:17

## 2023-12-29 RX ADMIN — METOPROLOL SUCCINATE 50 MG: 50 TABLET, EXTENDED RELEASE ORAL at 09:16

## 2023-12-29 RX ADMIN — BUDESONIDE AND FORMOTEROL FUMARATE DIHYDRATE 2 PUFF: 160; 4.5 AEROSOL RESPIRATORY (INHALATION) at 09:14

## 2023-12-29 RX ADMIN — UMECLIDINIUM 1 PUFF: 62.5 AEROSOL, POWDER ORAL at 09:14

## 2023-12-29 RX ADMIN — INSULIN LISPRO 2 UNITS: 100 INJECTION, SOLUTION INTRAVENOUS; SUBCUTANEOUS at 09:15

## 2023-12-29 RX ADMIN — APIXABAN 5 MG: 5 TABLET, FILM COATED ORAL at 09:16

## 2023-12-29 RX ADMIN — ATORVASTATIN CALCIUM 10 MG: 10 TABLET, FILM COATED ORAL at 09:17

## 2023-12-29 RX ADMIN — POTASSIUM CHLORIDE 40 MEQ: 1500 TABLET, EXTENDED RELEASE ORAL at 09:16

## 2023-12-29 RX ADMIN — BUMETANIDE 6 MG: 2 TABLET ORAL at 09:16

## 2023-12-29 NOTE — ASSESSMENT & PLAN NOTE
Lab Results   Component Value Date    HGBA1C 9.3 (H) 12/28/2023       Recent Labs     12/28/23  1142 12/28/23  1651 12/28/23  2044 12/29/23  0812   POCGLU 345* 230* 280* 210*         Blood Sugar Average: Last 72 hrs:  (P) 260.0876046488633247  At home, on empagliflozin and Sitagliptin.   oral meds including empagliflozin   Sitagliptin.

## 2023-12-29 NOTE — PLAN OF CARE
Problem: PAIN - ADULT  Goal: Verbalizes/displays adequate comfort level or baseline comfort level  Description: Interventions:  - Encourage patient to monitor pain and request assistance  - Assess pain using appropriate pain scale  - Administer analgesics based on type and severity of pain and evaluate response  - Implement non-pharmacological measures as appropriate and evaluate response  - Consider cultural and social influences on pain and pain management  - Notify physician/advanced practitioner if interventions unsuccessful or patient reports new pain  Outcome: Progressing     Problem: INFECTION - ADULT  Goal: Absence or prevention of progression during hospitalization  Description: INTERVENTIONS:  - Assess and monitor for signs and symptoms of infection  - Monitor lab/diagnostic results  - Monitor all insertion sites, i.e. indwelling lines, tubes, and drains  - Monitor endotracheal if appropriate and nasal secretions for changes in amount and color  - [place] appropriate cooling/warming therapies per order  - Administer medications as ordered  - Instruct and encourage patient and family to use good hand hygiene technique  - Identify and instruct in appropriate isolation precautions for identified infection/condition  Outcome: Progressing  Goal: Absence of fever/infection during neutropenic period  Description: INTERVENTIONS:  - Monitor WBC    Outcome: Progressing     Problem: SAFETY ADULT  Goal: Patient will remain free of falls  Description: INTERVENTIONS:  - Educate patient/family on patient safety including physical limitations  - Instruct patient to call for assistance with activity   - Consult OT/PT to assist with strengthening/mobility   - Keep Call bell within reach  - Keep bed low and locked with side rails adjusted as appropriate  - Keep care items and personal belongings within reach  - Initiate and maintain comfort rounds  - Make Fall Risk Sign visible to staff  - Offer Toileting every  Hours,  in advance of need  - Initiate/Maintain alarm  - Obtain necessary fall risk management equipment:   - Apply yellow socks and bracelet for high fall risk patients  - Consider moving patient to room near nurses station  Outcome: Progressing  Goal: Maintain or return to baseline ADL function  Description: INTERVENTIONS:  -  Assess patient's ability to carry out ADLs; assess patient's baseline for ADL function and identify physical deficits which impact ability to perform ADLs (bathing, care of mouth/teeth, toileting, grooming, dressing, etc.)  - Assess/evaluate cause of self-care deficits   - Assess range of motion  - Assess patient's mobility; develop plan if impaired  - Assess patient's need for assistive devices and provide as appropriate  - Encourage maximum independence but intervene and supervise when necessary  - Involve family in performance of ADLs  - Assess for home care needs following discharge   - Consider OT consult to assist with ADL evaluation and planning for discharge  - Provide patient education as appropriate  Outcome: Progressing  Goal: Maintains/Returns to pre admission functional level  Description: INTERVENTIONS:  - Perform AM-PAC 6 Click Basic Mobility/ Daily Activity assessment daily.  - Set and communicate daily mobility goal to care team and patient/family/caregiver.   - Collaborate with rehabilitation services on mobility goals if consulted  - Perform Range of Motion  times a day.  - Reposition patient every  hours.  - Dangle patient  times a day  - Stand patient  times a day  - Ambulate patient  times a day  - Out of bed to chair  times a day   - Out of bed for meals times a day  - Out of bed for toileting  - Record patient progress and toleration of activity level   Outcome: Progressing     Problem: DISCHARGE PLANNING  Goal: Discharge to home or other facility with appropriate resources  Description: INTERVENTIONS:  - Identify barriers to discharge w/patient and caregiver  - Arrange for  needed discharge resources and transportation as appropriate  - Identify discharge learning needs (meds, wound care, etc.)  - Arrange for interpretive services to assist at discharge as needed  - Refer to Case Management Department for coordinating discharge planning if the patient needs post-hospital services based on physician/advanced practitioner order or complex needs related to functional status, cognitive ability, or social support system  Outcome: Progressing     Problem: Knowledge Deficit  Goal: Patient/family/caregiver demonstrates understanding of disease process, treatment plan, medications, and discharge instructions  Description: Complete learning assessment and assess knowledge base.  Interventions:  - Provide teaching at level of understanding  - Provide teaching via preferred learning methods  Outcome: Progressing     Problem: CARDIOVASCULAR - ADULT  Goal: Maintains optimal cardiac output and hemodynamic stability  Description: INTERVENTIONS:  - Monitor I/O, vital signs and rhythm  - Monitor for S/S and trends of decreased cardiac output  - Administer and titrate ordered vasoactive medications to optimize hemodynamic stability  - Assess quality of pulses, skin color and temperature  - Assess for signs of decreased coronary artery perfusion  - Instruct patient to report change in severity of symptoms  Outcome: Progressing  Goal: Absence of cardiac dysrhythmias or at baseline rhythm  Description: INTERVENTIONS:  - Continuous cardiac monitoring, vital signs, obtain 12 lead EKG if ordered  - Administer antiarrhythmic and heart rate control medications as ordered  - Monitor electrolytes and administer replacement therapy as ordered  Outcome: Progressing     Problem: RESPIRATORY - ADULT  Goal: Achieves optimal ventilation and oxygenation  Description: INTERVENTIONS:  - Assess for changes in respiratory status  - Assess for changes in mentation and behavior  - Position to facilitate oxygenation and  minimize respiratory effort  - Oxygen administered by appropriate delivery if ordered  - Initiate smoking cessation education as indicated  - Encourage broncho-pulmonary hygiene including cough, deep breathe, Incentive Spirometry  - Assess the need for suctioning and aspirate as needed  - Assess and instruct to report SOB or any respiratory difficulty  - Respiratory Therapy support as indicated  Outcome: Progressing

## 2023-12-29 NOTE — ASSESSMENT & PLAN NOTE
Wt Readings from Last 3 Encounters:   12/29/23 (!) 152 kg (335 lb 6.4 oz)   12/15/23 (!) 157 kg (346 lb)   12/13/23 (!) 157 kg (346 lb 3.2 oz)     Acute on chronic heart failure with preserved EF. Stage C, NYHA III.  Etiology A-fib, hypertension, obesity  Dry weight 335 pounds, weight today 349 lb.  proBNP 245, previously in the 90-160s.  Echo 4/2023 EF of 50%, grade 2 diastolic dysfunction.  Diuretic regimen Bumex 6 mg twice daily, spironolactone 25 mg daily, metolazone 2.5 mg as needed and Jardiance 10 mg daily.  Acute on chronic exacerbation, not responding to outpatient treatment.  Bumex gtt as per heart failure changed to oral diuretic by heart failure service  Continue spironolactone, Jardiance.  Continue potassium supplementation with diuretics.  Monitor BMP bid    Patient's weight down to 339 pounds with good urine output, continued on Bumex as per heart failure

## 2023-12-29 NOTE — PROGRESS NOTES
Cardiology Progress Note - Mesfin Cano 58 y.o. male MRN: [id number]    Unit/Bed#: -01 Encounter: [id number]      Assessment/Plan:   Acute on chronic HFpEF-EF 50%, s/p CardioMEMS implant 3/9/2022  Last echo 4/2023  CardioMEMS: Goal 15.  PAD ranged from 12-18 in the days leading up to admission  Intake/output: +1140/-3025: -1885  Weight: 335 pounds (down from 338 pounds yesterday.  Patient reports dry weight of 335 pounds)  Patient appears very mildly hypervolemic on exam, has improved  Cr 1.86 today.  Has remained stable from 1.8 - 2 for the majority of current admission  Continue home dose of Bumex 6 mg p.o. twice daily  Continue Jardiance, Entresto, spironolactone  Continue 2 g sodium diet, fluid restriction, daily weights  Patient is stable for discharge from a heart failure standpoint.  Outpatient heart failure follow-up scheduled for 1/5.  BMP order placed for patient to obtain prior to appointment.    2.  Paroxysmal atrial fibrillation  In normal sinus rhythm on telemetry.  Continue Toprol, Eliquis.  [id number] 3    3.  Hypertension  BP stable.  Continue spironolactone, Entresto    4.  Hyperlipidemia  Continue statin    5.  Type II DM  A1c 12/28: 9.3  Recommend follow-up with primary care provider     6.  VICKY  Recommend outpatient follow-up and regular CPAP use once able to obtain CPAP machine    7.  CKD  8.  Severe persistent asthma  9.  History of tobacco use       Patient is stable from a heart failure standpoint.  Heart failure team will sign-off. Please reach out with any further questions or concerns.   Patient has outpatient heart failure appointment scheduled for 1/5/2024 at 4:20 PM.  Please obtain BMP lab work prior to appointment.    Subjective:   Patient seen and examined.  No significant events overnight.  Patient states that his shortness of breath continues to improve.  He states he still gets mildly short of breath with exertion, but this has decreased since yesterday.  He states his leg  "swelling has also decreased since yesterday.  Overall is feeling better.    Objective:     Vitals: Blood pressure 98/59, pulse 82, temperature 98.3 °F (36.8 °C), temperature source Oral, resp. rate 18, height 6' 1\" (1.854 m), weight (!) 152 kg (335 lb 6.4 oz), SpO2 95%., Body mass index is 44.25 kg/m².,   Orthostatic Blood Pressures      Flowsheet Row Most Recent Value   Blood Pressure 98/59 filed at 12/29/2023 1059   Patient Position - Orthostatic VS Sitting filed at 12/29/2023 1059              Intake/Output Summary (Last 24 hours) at 12/29/2023 1224  Last data filed at 12/29/2023 1059  Gross per 24 hour   Intake 1320 ml   Output 2170 ml   Net -850 ml         Physical Exam:   GEN: Alert and oriented x 3, in no acute distress.  Well appearing and well nourished.  Obese body habitus.  HEENT: Sclera anicteric, conjunctivae pink, mucous membranes moist. Oropharynx clear.   NECK: Supple, no significant JVD. Trachea midline, no thyromegaly.   HEART: Regular rhythm, normal S1 and S2, no murmurs, clicks, gallops or rubs. PMI nondisplaced, no thrills.   LUNGS: Clear to auscultation bilaterally; no wheezes, rales, or rhonchi. No increased work of breathing or signs of respiratory distress.   ABDOMEN: Soft, nontender, non-distended.   EXTREMITIES: 1+ pitting edema to bilateral lower extremities.  Skin warm and well perfused, no clubbing or cyanosis.  NEURO: No focal findings. Normal speech. Mood and affect normal.   SKIN: Normal without suspicious lesions on exposed skin.      Telemetry:  Telemetry Orders (From admission, onward)               24 Hour Telemetry Monitoring  Continuous x 24 Hours (Telem)        Expiring   Question:  Reason for 24 Hour Telemetry  Answer:  Decompensated CHF- and any one of the following: continuous diuretic infusion or total diuretic dose >200 mg daily, associated electrolyte derangement (I.e. K < 3.0), ionotropic drip (continuous infusion), hx of ventricular arrhythmia, or new EF < 35%         "             Telemetry Reviewed: PVCs      Medications:      Current Facility-Administered Medications:     albuterol (PROVENTIL HFA,VENTOLIN HFA) inhaler 2 puff, 2 puff, Inhalation, Q4H PRN, William Stahl MD    apixaban (ELIQUIS) tablet 5 mg, 5 mg, Oral, BID, William Stahl MD, 5 mg at 12/29/23 0916    atorvastatin (LIPITOR) tablet 10 mg, 10 mg, Oral, Daily, William Stahl MD, 10 mg at 12/29/23 0917    budesonide-formoterol (SYMBICORT) 160-4.5 mcg/act inhaler 2 puff, 2 puff, Inhalation, BID, William Stahl MD, 2 puff at 12/29/23 0914    bumetanide (BUMEX) tablet 6 mg, 6 mg, Oral, BID, Puma Jarquin PA-C, 6 mg at 12/29/23 0916    Empagliflozin (JARDIANCE) tablet 10 mg, 10 mg, Oral, Daily, Puma Jarquin PA-C, 10 mg at 12/29/23 0916    insulin glargine (LANTUS) subcutaneous injection 6 Units 0.06 mL, 6 Units, Subcutaneous, HS, Faheem Banda, DO, 6 Units at 12/28/23 2113    insulin lispro (HumaLOG) 100 units/mL subcutaneous injection 1-6 Units, 1-6 Units, Subcutaneous, TID AC, 5 Units at 12/29/23 1212 **AND** Fingerstick Glucose (POCT), , , TID AC, William Stahl MD    metoprolol succinate (TOPROL-XL) 24 hr tablet 50 mg, 50 mg, Oral, Daily, William Stahl MD, 50 mg at 12/29/23 0916    pantoprazole (PROTONIX) EC tablet 40 mg, 40 mg, Oral, Daily, William Stahl MD, 40 mg at 12/29/23 0917    potassium chloride (K-DUR,KLOR-CON) CR tablet 40 mEq, 40 mEq, Oral, BID, William Stahl MD, 40 mEq at 12/29/23 0916    pregabalin (LYRICA) capsule 50 mg, 50 mg, Oral, HS, William Stahl MD, 50 mg at 12/28/23 2113    sacubitril-valsartan (ENTRESTO) 24-26 MG per tablet 1 tablet, 1 tablet, Oral, BID, Christiano Nelson DO, 1 tablet at 12/29/23 0916    spironolactone (ALDACTONE) tablet 25 mg, 25 mg, Oral, Daily, William Stahl MD, 25 mg at 12/29/23 0917    umeclidinium 62.5 mcg/actuation inhaler AEPB 1 puff, 1 puff, Inhalation, Daily, William Stahl MD, 1 puff at 12/29/23 0914     Labs & Results:        Results from last 7 days   Lab Units 12/26/23  4172  12/24/23  0443 12/23/23  1515   WBC Thousand/uL 5.92 5.83 6.59   HEMOGLOBIN g/dL 9.3* 9.8* 10.5*   HEMATOCRIT % 31.0* 31.3* 34.1*   PLATELETS Thousands/uL 253 235 243         Results from last 7 days   Lab Units 12/29/23  0530 12/28/23  1838 12/28/23  0818 12/24/23  0443 12/23/23  1515   POTASSIUM mmol/L 3.8 4.3 3.6   < > 3.7   CHLORIDE mmol/L 100 94* 97   < > 101   CO2 mmol/L 30 25 30   < > 22   BUN mg/dL 25 26* 23   < > 11   CREATININE mg/dL 1.86* 2.00* 1.87*   < > 1.13   CALCIUM mg/dL 7.7* 8.6 7.6*   < > 8.0*   ALK PHOS U/L  --   --   --   --  105*   ALT U/L  --   --   --   --  9   AST U/L  --   --   --   --  18    < > = values in this interval not displayed.         Results from last 7 days   Lab Units 12/26/23  0546 12/24/23  0443   MAGNESIUM mg/dL 2.5 1.7*

## 2023-12-29 NOTE — DISCHARGE SUMMARY
Health system  Discharge- Mesfin Cano 1965, 58 y.o. male MRN: [id number]  Unit/Bed#: [facility] [place]9-01 Encounter: [id number]  Primary Care Provider: Soo Chavez MD   Date and time admitted to hospital: 12/23/2023  2:46 PM    * Acute on chronic heart failure with preserved ejection fraction (HFpEF) ([facility])  Assessment & Plan  Wt Readings from Last 3 Encounters:   12/29/23 (!) 152 kg (335 lb 6.4 oz)   12/15/23 (!) 157 kg (346 lb)   12/13/23 (!) 157 kg (346 lb 3.2 oz)     Acute on chronic heart failure with preserved EF. Stage C, NYHA III.  Etiology A-fib, hypertension, obesity  Dry weight 335 pounds, weight today 349 lb.  proBNP 245, previously in the 90-160s.  Echo 4/2023 EF of 50%, grade 2 diastolic dysfunction.  Diuretic regimen Bumex 6 mg twice daily, spironolactone 25 mg daily, metolazone 2.5 mg as needed and Jardiance 10 mg daily.  Acute on chronic exacerbation, not responding to outpatient treatment.  Bumex gtt as per heart failure changed to oral diuretic by heart failure service  Continue spironolactone, Jardiance.  Continue potassium supplementation with diuretics.  Monitor BMP bid    Patient's weight down to 339 pounds with good urine output, continued on Bumex as per heart failure    Paroxysmal atrial fibrillation (HCC)  Assessment & Plan  Continue metoprolol succinate 50 mg daily.  Continue Eliquis for anticoagulation.    Severe persistent asthma  Assessment & Plan  Not currently in exacerbation.  Continue albuterol, Symbicort and Spiriva equivalent.    Hyperlipidemia  Assessment & Plan  Continue home atorvastatin.    Type 2 diabetes mellitus with hyperglycemia, without long-term current use of insulin ([facility])  Assessment & Plan  Lab Results   Component Value Date    HGBA1C 9.3 (H) 12/28/2023       Recent Labs     12/28/23  1142 12/28/23  1651 12/28/23  2044 12/29/23  0812   POCGLU 345* 230* 280* 210*         Blood Sugar Average: Last 72 hrs:  (P)  [id number]  At home, on empagliflozin and Sitagliptin.   oral meds including empagliflozin   Sitagliptin.                Medical Problems       Resolved Problems  Date Reviewed: 12/25/2023   None         Admission Date:   Admission Orders (From admission, onward)       Ordered        12/23/23 1700  INPATIENT ADMISSION  Once                            Admitting Diagnosis: Edema [R60.9]  Shortness of breath [R06.02]  CHF exacerbation (HCC) [I50.9]    HPI: This is a very pleasant 58-year-old gentleman with past medical history of heart failure with preserved ejection fraction atrial fibrillation severe asthma diabetes diabetic neuropathy who presents with symptoms of shortness of breath.  Patient presented to the hospital for further workup evaluation.  He was noted to have a 10 pound weight gain.  He was followed up by cardiology with initial plans of augmenting diuretic regimen with metolazone.  Unfortunately he continued to have worsening symptoms and presented to the ED for further workup and evaluation.  Patient reports that he is compliant with his fluid restriction and salt restriction at home.    Procedures Performed:   Orders Placed This Encounter   Procedures    POC Cardiac US       Summary of Hospital Course: He was admitted for further workup and heart failure consultation.  Will discharge patient on Bumex 6 mg twice daily.  Patient to be on Entresto and Jardiance discussed case with cardiology.  Okay for discharge.            Discharge instructions/Information to patient and family:   See after visit summary for information provided to patient and family.      Provisions for Follow-Up Care:  See after visit summary for information related to follow-up care and any pertinent home health orders.      PCP: Soo Chavez MD    Disposition: Home    Planned Readmission: No    Discharge Statement   I spent 85 minutes discharging the patient. This time was spent on the day of discharge. I had  direct contact with the patient on the day of discharge. Additional documentation is required if more than 30 minutes were spent on discharge.     Discharge Medications:  See after visit summary for reconciled discharge medications provided to patient and family.

## 2024-01-02 ENCOUNTER — TELEMEDICINE (OUTPATIENT)
Dept: HEMATOLOGY ONCOLOGY | Facility: CLINIC | Age: 59
End: 2024-01-02
Payer: COMMERCIAL

## 2024-01-02 ENCOUNTER — TELEPHONE (OUTPATIENT)
Dept: HEMATOLOGY ONCOLOGY | Facility: CLINIC | Age: 59
End: 2024-01-02

## 2024-01-02 ENCOUNTER — CLINICAL SUPPORT (OUTPATIENT)
Dept: CARDIOLOGY CLINIC | Facility: CLINIC | Age: 59
End: 2024-01-02
Payer: COMMERCIAL

## 2024-01-02 ENCOUNTER — TELEPHONE (OUTPATIENT)
Dept: CARDIOLOGY CLINIC | Facility: CLINIC | Age: 59
End: 2024-01-02

## 2024-01-02 ENCOUNTER — TELEPHONE (OUTPATIENT)
Dept: HEMATOLOGY ONCOLOGY | Facility: MEDICAL CENTER | Age: 59
End: 2024-01-02

## 2024-01-02 DIAGNOSIS — I50.30 DIASTOLIC CONGESTIVE HEART FAILURE, UNSPECIFIED HF CHRONICITY (HCC): Primary | ICD-10-CM

## 2024-01-02 DIAGNOSIS — N18.30 ANEMIA DUE TO STAGE 3 CHRONIC KIDNEY DISEASE, UNSPECIFIED WHETHER STAGE 3A OR 3B CKD: ICD-10-CM

## 2024-01-02 DIAGNOSIS — N18.30 STAGE 3 CHRONIC KIDNEY DISEASE, UNSPECIFIED WHETHER STAGE 3A OR 3B CKD (HCC): ICD-10-CM

## 2024-01-02 DIAGNOSIS — D63.1 ANEMIA DUE TO STAGE 3 CHRONIC KIDNEY DISEASE, UNSPECIFIED WHETHER STAGE 3A OR 3B CKD: ICD-10-CM

## 2024-01-02 DIAGNOSIS — E66.01 MORBID OBESITY DUE TO EXCESS CALORIES (HCC): Primary | Chronic | ICD-10-CM

## 2024-01-02 PROBLEM — N18.9 ANEMIA DUE TO CHRONIC KIDNEY DISEASE: Status: ACTIVE | Noted: 2023-10-16

## 2024-01-02 PROCEDURE — 93264 REM MNTR WRLS P-ART PRS SNR: CPT | Performed by: INTERNAL MEDICINE

## 2024-01-02 PROCEDURE — 99442 PR PHYS/QHP TELEPHONE EVALUATION 11-20 MIN: CPT | Performed by: INTERNAL MEDICINE

## 2024-01-02 NOTE — TELEPHONE ENCOUNTER
Patient Call    Who are you speaking with? Patient    If it is not the patient, are they listed on an active communication consent form? N/A   What is the reason for this call? Patient had a 320 virtual with Harjeet today and states he still hasn't gotten a phone call yet    Does this require a call back? Yes   If a call back is required, please list best call back number [phone]   If a call back is required, advise that a message will be forwarded to their care team and someone will return their call as soon as possible.   Did you relay this information to the patient? Yes

## 2024-01-02 NOTE — TELEPHONE ENCOUNTER
Called and spoke with patient, advised Dr Cosby is only able to do a telephone call as he does not have access to a camera today. Pt understood and confirmed appt time for today

## 2024-01-02 NOTE — TELEPHONE ENCOUNTER
Patient Call    Who are you speaking with? Patient    If it is not the patient, are they listed on an active communication consent form? N/A   What is the reason for this call? Patient calling to confirm that he would like a virtual appointment today with Dr. Cosby at 3:20pm.  Patient stated he would like to do a video visit if possible.  Patient would like a call back to discuss if he can do a video visit or if the appointment will be a phone call.    Does this require a call back? Yes   If a call back is required, please list [facility] call back number [phone]   If a call back is required, advise that a message will be forwarded to their care team and someone will return their call as soon as possible.   Did you relay this information to the patient? Yes

## 2024-01-02 NOTE — UTILIZATION REVIEW
NOTIFICATION OF ADMISSION DISCHARGE   This is a Notification of Discharge from [facility]. Please be advised that this patient has been discharge from our facility. Below you will find the admission and discharge date and time including the patient’s disposition.   UTILIZATION REVIEW CONTACT:  Bayron Candelaria  Utilization   Network Utilization Review Department  Phone: [phone] x carefully listen to the prompts. All voicemails are confidential.  Email: [email or web address]     ADMISSION INFORMATION  PRESENTATION DATE: 12/23/2023  2:46 PM  OBERVATION ADMISSION DATE:   INPATIENT ADMISSION DATE: 12/23/23  5:00 PM   DISCHARGE DATE: 12/29/2023  2:36 PM   DISPOSITION:Home/Self Care    Network Utilization Review Department  ATTENTION: Please call with any questions or concerns to [phone] and carefully listen to the prompts so that you are directed to the right person. All voicemails are confidential.   For Discharge needs, contact Care Management DC Support Team at [phone] opt. 2  Send all requests for admission clinical reviews, approved or denied determinations and any other requests to dedicated fax number below belonging to the campus where the patient is receiving treatment. List of dedicated fax numbers for the Facilities:  FACILITY NAME UR FAX NUMBER   ADMISSION DENIALS (Administrative/Medical Necessity) [phone]   DISCHARGE SUPPORT TEAM ([facility]) [phone]   PARENT CHILD HEALTH (Maternity/NICU/Pediatrics) [phone]   [facility] [phone]   [facility] [phone]   [facility] [phone]   [facility] [phone]   [facility] [phone]   [facility] [phone]   [facility] [phone]   [facility] [phone]   [facility]  [facility] [phone]   [facility] [phone]   [facility] [phone]

## 2024-01-02 NOTE — TELEPHONE ENCOUNTER
"Left message informing patient Dr. Cosby is sick and only doing virtuals.  Indicated they can keep appointment, but it would be switched to virtual.  If \"in-person\" is preferred, indicated patient would need to be rescheduled to a later date, and that we would call back once time slot was found.  Provided [facility] callback number [phone], and indicated I would call back throughout day.  "

## 2024-01-02 NOTE — PROGRESS NOTES
Virtual Regular Visit  Verification of patient location:  Patient is located at Home in the following state in which I hold an active license [place]    Assessment/Plan: 58-year-old male with multiple medical problems including anemia of chronic renal sufficiency, chronic kidney disease, CHF and multiple admissions for exacerbation of asthma with a history of eosinophilia.  Presently Mr. Cano states feeling okay, better than before.    Patient recently underwent a bone marrow biopsy.  Morphology evaluation, flow cytometry, cytogenetics, FISH studies and molecular studies all together did not demonstrate any evidence of a primary hematologic malignancy.  Specifically pathologist thought that the elevated eosinophil count was secondary/reactive to the patient's medical issues.  This was discussed at length with patient and wife today.    Mr. Cano also has anemia; likely anemia of chronic renal sufficiency +/- associated with the CHF.  Hemoglobin level runs approximately 9-10 g/deciliter.  Patient has a pending nephrology evaluation; also will follow-up with cardiology.  We discussed what to monitor for as far as progressive fatigue, pallor, decreased activities etc.  If no contraindications from other services, patient can take a multivitamin with iron.  Patient may also be a candidate for an BENJAMIN.    Mr. Cano is to return to the office in approximately 4 to 5 weeks with a repeat CBC before.  Patient knows to call if he has any other questions or concerns.    Carefully review your medication list and verify that the list is accurate and up-to-date. Please call the hematology/oncology office if there are medications missing from the list, medications on the list that you are not currently taking or if there is a dosage or instruction that is different from how you're taking that medication.     Patient goals and areas of care: Monitor H/H levels  Barriers to care: None  Patient is able to self-care    Problem List Items  Addressed This Visit    None    Reason for visit is eosinophilia.    Chief Complaint   Patient presents with    Virtual Regular Visit     Encounter provider Elian Cosby MD    Provider located at [address] [facility] [address] [address]    Recent Visits  No visits were found meeting these conditions.  Showing recent visits within past 7 days and meeting all other requirements  Today's Visits  Date Type Provider Dept   01/02/24 Telemedicine Elian Cosby MD [facility]   Showing today's visits and meeting all other requirements  Future Appointments  No visits were found meeting these conditions.  Showing future appointments within next 150 days and meeting all other requirements     The patient was identified by name and date of birth. Mesfin Cano was informed that this is a telemedicine visit and that the visit is being conducted through Telephone.  My office door was closed. No one else was in the room.  He acknowledged consent and understanding of privacy and security of the video platform. The patient has agreed to participate and understands they can discontinue the visit at any time.    Patient is aware this is a billable service.     Subjective    Mesfin Cano is a 58 y.o. male with multiple admissions for exacerbation of asthma; history of eosinophilia.    HPI: 58-year-old male with a history of CHF, chronic anemia, diabetic polyneuropathy, asthma, multiple admissions for exacerbation of asthma.  Patient has had an elevated eosinophil count in the past.  Mr. Sawant also had a significant reaction to a prior COVID-vaccine.    Because of prior elevated eosinophil counts, patient was referred to hematology for evaluation.  Patient recently underwent a bone marrow biopsy.    Presently Mr. Cano states feeling okay baseline.  Patient was once again recently admitted with CHF.  Presently no shortness of breath or dyspnea on  exertion. No fevers or signs of infection.  Appetites okay, weight is stable.  Activities are limited but the same as before.  No bruising or bleeding.  No syncopal episodes.  No GI or  problems.    Past Medical History:   Diagnosis Date    Acute gastritis without hemorrhage     last assessed 3/24/17    Asthma     Atrial fibrillation (HCC)     Bilateral leg edema 02/19/2020    CHF (congestive heart failure) (HCC)     Coronary artery disease     Daytime sleepiness 07/17/2019    Diabetes mellitus (HCC)     Dyspnea on exertion 10/11/2021    Edema of both feet 01/23/2020    Gastric bypass status for obesity     Hyperlipidemia     Hypertension     Hypokalemia 11/14/2021    Impaired fasting glucose     last assessed 5/10/17    Knee sprain, bilateral 06/14/2018    Leg cramps 06/16/2022    Obesity     Viral gastroenteritis     last assessed 11/4/16       Past Surgical History:   Procedure Laterality Date    CARDIAC CATHETERIZATION N/A 3/9/2022    Procedure: CARDIAC RHC W/ PRESSURE SENSOR;  Surgeon: Aminata Wilcox MD;  Location: [facility];  Service: Cardiology    CARDIAC CATHETERIZATION N/A 9/6/2022    Procedure: Cardiac catheterization;  Surgeon: Yolanda Del Rosario DO;  Location: [facility];  Service: Cardiology    CARDIAC CATHETERIZATION N/A 9/6/2022    Procedure: Cardiac Coronary Angiogram;  Surgeon: Yolanda Del Rosario DO;  [facility];  Service: Cardiology    CARDIAC CATHETERIZATION N/A 10/11/2023    Procedure: Cardiac RHC;  Surgeon: Yoel Darden MD;  Location: [facility];  Service: Cardiology    CARPAL TUNNEL RELEASE      bilateral    GASTRIC BYPASS  2004    with han en y    HERNIA REPAIR      ventral inscisional    TONSILLECTOMY         Current Outpatient Medications   Medication Sig Dispense Refill    Accu-Chek FastClix Lancets MISC Test blood sugar twice daily 200 each 3    albuterol (PROVENTIL HFA,VENTOLIN HFA) 90 mcg/act inhaler Inhale 2 puffs every 4 (four) hours  as needed for wheezing or shortness of breath 18 g 0    apixaban (Eliquis) 5 mg Take 1 tablet (5 mg total) by mouth 2 (two) times a day 60 tablet 5    atorvastatin (LIPITOR) 10 mg tablet TAKE 1 TABLET BY MOUTH EVERY DAY 90 tablet 3    Blood Glucose Monitoring Suppl (Accu-Chek Guide) w/Device KIT Use 2 (two) times a day Test blood sugar twice daily 1 kit 0    budesonide-formoterol (Symbicort) 160-4.5 mcg/act inhaler Inhale 2 puffs 2 (two) times a day Rinse mouth after use. 30.6 g 2    bumetanide (BUMEX) 2 mg tablet Take 3 tablets (6 mg total) by mouth 2 (two) times a day 540 tablet 1    Empagliflozin (JARDIANCE) 10 MG TABS tablet Take 1 tablet (10 mg total) by mouth daily 30 tablet 11    EPINEPHrine (EPIPEN) 0.3 mg/0.3 mL SOAJ Inject 0.3 mL (0.3 mg total) into a muscle once for 1 dose 0.6 mL 0    fluticasone (FLONASE) 50 mcg/act nasal spray 1 spray into each nostril 2 (two) times a day  0    loratadine (CLARITIN) 10 mg tablet Take 1 tablet (10 mg total) by mouth daily Do not start before October 13, 2023.  0    metolazone (ZAROXOLYN) 2.5 mg tablet Take 20-30 minutes before Bumex dose and with additional potassium.Take for weight gain of 2-3 lbs in 24 hours, 5lbs in one week or signs of worsening fluid retention. 90 tablet 0    metoprolol succinate (TOPROL-XL) 50 mg 24 hr tablet TAKE ONE (1) TABLET BY MOUTH DAILY 90 tablet 3    montelukast (SINGULAIR) 10 mg tablet Take 1 tablet (10 mg total) by mouth daily at bedtime 90 tablet 1    pantoprazole (PROTONIX) 40 mg tablet Take 1 tablet (40 mg total) by mouth daily 21 tablet 0    potassium chloride (K-DUR,KLOR-CON) 20 mEq tablet Take 2 tablets (40 mEq total) by mouth 2 (two) times a day 120 tablet 5    pregabalin (LYRICA) 50 mg capsule Take 1 caps. at bedtime 30 capsule 5    sacubitril-valsartan (Entresto) 24-26 MG TABS Take 1 tablet by mouth 2 (two) times a day 60 tablet 0    sitaGLIPtin (Januvia) 100 mg tablet TAKE 1/2 TABLET DAILY (Patient taking differently: 50 mg  daily TAKE 1/2 TABLET DAILY) 15 tablet 5    Spiriva Respimat 1.25 MCG/ACT AERS inhaler INHALE 2 PUFFS BY MOUTH EVERY DAY 4 g 5    spironolactone (ALDACTONE) 25 mg tablet TAKE 1 TABLET (25 MG TOTAL) BY MOUTH DAILY. 90 tablet 2     No current facility-administered medications for this visit.        Allergies   Allergen Reactions    Azithromycin Other (See Comments)     Shaky, uneasy feeling     Bactrim [Sulfamethoxazole-Trimethoprim] Other (See Comments)     shakiness     Review of Systems   Constitutional:  Positive for fatigue.   HENT: Negative.     Eyes: Negative.    Respiratory: Negative.     Cardiovascular: Negative.    Gastrointestinal: Negative.    Endocrine: Negative.    Genitourinary: Negative.    Musculoskeletal: Negative.    Skin: Negative.    Allergic/Immunologic: Negative.    Neurological: Negative.    Hematological: Negative.    Psychiatric/Behavioral: Negative.     All other systems reviewed and are negative.    Laboratory    12/29/2023 BUN = 25 creatinine = 1.86    12/26/2023 WBC = 5.92 hemoglobin = 9.3 hematocrit = = 31 MCV = 87 platelet = 253 neutrophil = 63% bands = 2% lymphocyte = 17% monocyte = 10% eosinophil = 7% basophil = 1%          Pathology    Case Report   Surgical Pathology Report                         Case: [id number]                                   Authorizing Provider:  Elian Cosby MD           Collected:           12/14/2023 0902               Ordering Location:     [facility]      Received:            12/14/2023 [address] [place] [address] Interventional                                                                              Radiology                                                                     Pathologist:           Isaiah Atkins MD                                                                           Specimens:   A) - Iliac Crest, Right, core                                                                         B) - Iliac Crest, Right, clot                                                                        C) - Iliac Crest, Right, slide                                                            Final Diagnosis   A-C. BONE MARROW - PERIPHERAL BLOOD, ASPIRATE SMEARS, CORE BIOPSY AND CLOT SECTION - RIGHT ILIAC CREST BIOPSY: NORMOCELLULAR BONE MARROW (40-50% CELLULAR) WITH MATURING, MILDLY ERYTHROID-PREDOMINANT, TRILINEAGE HEMATOPOIESIS WITH NO DEFINITIVE INCREASE IN BLASTS; MEGAKARYOCYTIC HYPERPLASIA AND ATYPIA; MILD EOSINOPHILIA AND CLONAL T-CELL POPULATIONS; SEE IMPRESSION AND COMMENT     IMPRESSION:  The findings are of a normocellular bone marrow (40-50% cellular) with maturing, mildly erythroid-predominant, trilineage hematopoiesis with no definitive increase in blasts. There is megakaryocytic hyperplasia and atypia, not meeting the threshold of dysplasia. There is a mild eosinophilia and relative T-cell lymphocytosis, the latter of which is reportedly clonal by T-cell receptor gamma gene rearrangement studies. Other ancillary testing reportedly shows a normal karyotype, negative MDS, MPN and eosinophilia FISH panels and there are no pathogenic mutations detected by NGS. The overall findings are negative for a hematolymphoid neoplasm and the findings are favored to be reactive in the setting of asthma and other chronic underlying medical conditions. There is a single ERCC4 variant of unknown clinical significance (VAF 47.1%) which may represent a germline SNP; however, the spectrum of age-related clonal hematopoiesis of indeterminate potential (CHIP) and clonal cytopenias / cytoses of undetermined significance (CCUS) or an early and evolving myeloid neoplasm cannot be ruled out on a single time point. If cytopenias and / or cytoses persist or progress without clinical explanation, repeat bone marrow biopsy may be warranted at that time, as  clinically indicated. Given the chronicity of anemia and the erythroid predominance in the marrow, correlation with concurrent serum reticulocyte count and erythropoietin levels is recommended to further evaluate the etiology of the anemia, as clinically indicated.     The clonal T-cell population is also favored to be reactive in the setting of asthma and underlying medical conditions. T-cell clonopathies / pseudoclones are known to be present in the setting of eosinophilia, and may also be contributing to the anemia or other CBC abnormalities. If the same clone persists over time (> 6-12 months), however, additional workup can be initiated at that time.     Lastly, there is decreased storage iron in the current sample and correlation with serum iron studies is recommended, if clinically indicated.      Reviewed with agreement in intradepartmental consensus. See comment for microscopic description and ancillary testing.      COMMENT:  CBC DATA (12/14/2023):   WBC 6.81 K/uL (ANC 4.90 K/uL, ALC 0.90 K/uL, AMC 0.58 K/uL, AEC 0.32 K/uL, ABC 0.05 K/uL), RBC 4.16 million/uL, Hgb 10.9 g/dL, Hct 35.1%, MCV 84 fL, MCH 26.2 pg, MCHC 31.1 g/dL, RDW 17.3%, plt 287 K/uL     PERIPHERAL BLOOD SMEAR (12/14/2023):  A Dos Santos stained peripheral blood smear is received for review. There is a normocytic, hypochromic anemia (Hgb 10.9 g/dL), with anisopoikilocytosis, including macrocytes and microcytes, with no evidence of hemolysis. There is polychromasia and no significant circulating nucleated red blood cells. There is no evidence of hypergammaglobulinemia, cryoglobulins or rouleaux formation. Leukocytes are overall adequate in number (WBC 6.81 K/uL). Neutrophils are adequate in number (ANC 4.90 K/uL) and show no significant morphologic abnormalities. Lymphocytes are adequate in number (ALC 0.90 K/uL), including occasional atypical lymphocytes; there are no definitive circulating plasma cells. Monocytes are adequate in number (AMC  0.32 K/uL) and show no significant morphologic abnormalities. Eosinophils and basophils are mildly increased in number with no significant morphologic abnormalities. There are no circulating blasts or blast equivalents. Platelets are adequate in number (287 K/uL) and normal in morphology, with no evidence of platelet clumping or satellitism. There are no intracellular inclusions, infectious organisms or overt viral cytopathic changes.      BONE MARROW ASPIRATE SMEARS AND TOUCH PREPARATIONS:  Modified Dos Santos-Giemsa-like stained aspirate smears are received; no touch preparations are received for review. The aspirate smears are particulate, cellular and adequate for evaluation. Myeloid elements are relatively decreased in number and demonstrate a full range of maturation, with atypia, including occasional abnormally lobated forms; eosinophils are mildly increased in number (4.5%), with no significant dysplasia. Erythroid elements are relatively increased in number, mildly left-shifted, but demonstrate a full range of maturation, with atypia, including megaloblastoid change and occasional nuclear contour irregularities. Megakaryocytes are increased in number and demonstrate no significant dysplasia. There is no increase in blasts or blast equivalents (1.5%). There is no increase in or abnormal population of plasma cells or lymphocytes. The myeloid to erythroid ratio is decreased (0.8:1). There is no significant emperipolesis or hemophagocytosis.     BONE MARROW ASPIRATE SMEAR MANUAL DIFFERENTIAL (200 CELL COUNT):  Blasts: 1.5%  Pros: 0%  [place]/Myelo: 10.0%  Segs: 24.5%  Eryth: 53.5%  Lymphs: 3.0%  Mono: 3.0%  Eos: 4.5%  Plasma cells: 0%  Baso: 0%  M:E ratio = 0.8     BONE MARROW BIOPSY AND ASPIRATE CLOT:    H&E and PAS stained sections of the decalcified core biopsy and H&E stained sections of the aspirate clot are adequate for evaluation and are normocellular for the patient's age (40-50% cellular). Myeloid and  erythroid elements are adequate in number and demonstrate no significant dysplasia or morphologic abnormalities, and the myeloid to erythroid ratio is mildly decreased (erythroid-predominant). Megakaryocytes are increased in number, with a range of morphologies, including increased small and hypolobated forms, as well as loose clustering. There is no increase in, or abnormal localization of blasts, plasma cells or mast cells. Lymphocytes are mildly increased in number, predominantly scattered interstitially with no significant lymphoid aggregates. There are no granulomata, areas of necrosis or overt viral cytopathic changes. Vessels are unremarkable and there is no evidence of vasculitis. Bony trabeculae are normal for the patient's age. There is no morphologic evidence of amyloid deposition. There is no significant emperipolesis or hemophagocytosis. A single piece of unremarkable skin is present.     SPECIAL STAINS AND IMMUNOHISTOCHEMISTRY:  Prussian blue iron stains are performed on the aspirate smear, clot and core biopsy sections and highlight low-normal storage iron, with no evidence of ring sideroblasts. A reticulin stain performed on the core biopsy shows no significant increase in reticulin fibrosis (MF-0 of 3). A PAS stain demonstrates a roughly normal myeloid to erythroid ratio with mildly increased, and loosely clustered megakaryocytes.      CD34 and  stains highlight atypical and left-shifted megakaryocytes with no definitive increase in blasts (2-5%).  also highlights a subset of erythroid precursors and scattered mast cells. CD71 highlights increased erythroid precursors. Myeloperoxidase (MPO) and lysozyme highlight adequate but relatively decreased myelomonocytic precursors. CD61 highlights mildly increased and loosely clustered megakaryocytes. CD3 highlights mildly increased, scattered and focally clustered T-cells. CD20 highlights scattered and focally clustered B-cells.  highlights  no increase in plasma cells (<5%). S100 highlights scattered cells. Chromogranin-A and synaptophysin are negative. Cytokeratin AE1/3 and  highlight benign skin and are negative for metastatic carcinoma. All immunohistochemical and special stains are performed with appropriate controls.     FLOW CYTOMETRY STUDIES: [facility] #[id number]; see separate report.       CYTOGENETIC KARYOTYPE:  Cytogenetic karyotype studies are performed and reportedly show a normal male karyotype, 46,XY[20]; ([facility] #[id number]; see separate report):       CYTOGENETIC FISH STUDIES:  Cytogenetic FISH studies are performed (MDS Extended Panel) and are reportedly negative; ([facility] #[id number]; see separate report):        Cytogenetic FISH studies are performed (MPN Panel) and are reportedly negative; ([facility] #[id number]; see separate report):        Cytogenetic FISH studies are performed (Eosinophilia Panel) and are reportedly negative; ([facility] #[id number]; see separate report):        MOLECULAR STUDIES:  [employer]™ Heme Cancers is performed and reportedly detects no pathogenic mutations, with the following variants of unknown clinical significance (VUS); ([facility] #[id number]; see separate report):          T-cell Receptor Gamma Gene Rearrangement studies by PCR are performed and are reportedly: POSITIVE for clonality; ([facility] #[id number]; see separate report):        T-cell Receptor Beta Gene Rearrangement studies by PCR are performed and are reportedly: NEGATIVE - no clonal population detected ([facility] #[id number]; see separate report):       Visit Time  Total Visit Duration: 15 minutes

## 2024-01-03 NOTE — TELEPHONE ENCOUNTER
Dr. Cosby did call, just slightly late.  Please reach out if there were/are any further complications.  Thanks!

## 2024-01-07 DIAGNOSIS — T50.2X5A DIURETIC-INDUCED HYPOKALEMIA: ICD-10-CM

## 2024-01-07 DIAGNOSIS — E87.6 DIURETIC-INDUCED HYPOKALEMIA: ICD-10-CM

## 2024-01-07 PROBLEM — N18.4 CHRONIC RENAL DISEASE, STAGE IV (HCC): Status: RESOLVED | Noted: 2023-11-30 | Resolved: 2024-01-07

## 2024-01-08 ENCOUNTER — TELEPHONE (OUTPATIENT)
Dept: CARDIOLOGY CLINIC | Facility: CLINIC | Age: 59
End: 2024-01-08

## 2024-01-08 DIAGNOSIS — I50.32 CHRONIC HEART FAILURE WITH PRESERVED EJECTION FRACTION (HFPEF) (HCC): ICD-10-CM

## 2024-01-08 DIAGNOSIS — T50.2X5A DIURETIC-INDUCED HYPOKALEMIA: ICD-10-CM

## 2024-01-08 DIAGNOSIS — I50.32 CHRONIC HEART FAILURE WITH PRESERVED EJECTION FRACTION (HCC): ICD-10-CM

## 2024-01-08 DIAGNOSIS — J45.41 MODERATE PERSISTENT ASTHMA WITH ACUTE EXACERBATION: ICD-10-CM

## 2024-01-08 DIAGNOSIS — E87.6 DIURETIC-INDUCED HYPOKALEMIA: ICD-10-CM

## 2024-01-08 RX ORDER — SPIRONOLACTONE 25 MG/1
25 TABLET ORAL DAILY
Qty: 90 TABLET | Refills: 0 | Status: SHIPPED | OUTPATIENT
Start: 2024-01-08

## 2024-01-08 RX ORDER — POTASSIUM CHLORIDE 1500 MG/1
40 TABLET, EXTENDED RELEASE ORAL 2 TIMES DAILY
Qty: 360 TABLET | Refills: 2 | Status: SHIPPED | OUTPATIENT
Start: 2024-01-08

## 2024-01-08 RX ORDER — POTASSIUM CHLORIDE 20 MEQ/1
40 TABLET, EXTENDED RELEASE ORAL 2 TIMES DAILY
Qty: 120 TABLET | Refills: 0 | Status: SHIPPED | OUTPATIENT
Start: 2024-01-08 | End: 2024-02-07

## 2024-01-08 RX ORDER — METOPROLOL SUCCINATE 50 MG/1
50 TABLET, EXTENDED RELEASE ORAL DAILY
Qty: 90 TABLET | Refills: 0 | Status: SHIPPED | OUTPATIENT
Start: 2024-01-08

## 2024-01-08 RX ORDER — TIOTROPIUM BROMIDE INHALATION SPRAY 1.56 UG/1
2 SPRAY, METERED RESPIRATORY (INHALATION) DAILY
Qty: 4 G | Refills: 0 | Status: SHIPPED | OUTPATIENT
Start: 2024-01-08 | End: 2024-02-07

## 2024-01-08 NOTE — TELEPHONE ENCOUNTER
Spoke with Keon. He states he feels good and he sounds good. Wt is steady, denies any symptoms presently.  Bp -wnl. Tolerating the Entresto.    He does hacve appt on 1/11/24 he he will be there.  He knows to call me with any issues or concerns.    Cardiomems checked. Pt was 10 todY, Goal is 15.  He was 12-13 on last weeks readings.

## 2024-01-10 NOTE — PROGRESS NOTES
Advanced Heart Failure / Pulmonary Hypertension Outpatient Progress Note    Mesfin Cano 58 y.o. male   MRN: [id number]  Encounter: [id number]    Assessment:  Patient Active Problem List    Diagnosis Date Noted    Eosinophilia 10/18/2023    Anemia due to chronic kidney disease 10/16/2023    Nasal congestion 10/06/2023    Loose stools 04/17/2023    Chronic diastolic CHF (HCC) 04/10/2023    Diabetic polyneuropathy associated with type 2 diabetes mellitus ([facility]) 12/20/2022    CKD (chronic kidney disease) stage 3 ([facility]) 09/16/2022    Paroxysmal atrial fibrillation (HCC) 03/01/2022    Acute on chronic heart failure with preserved ejection fraction (HFpEF) ([facility]) 11/12/2021    Severe persistent asthma 10/11/2021    Hyponatremia 07/31/2021    HNP (herniated nucleus pulposus), lumbar 02/23/2021    Foraminal stenosis of lumbar region 02/23/2021    VICKY (obstructive sleep apnea)     Hyperlipidemia 04/18/2019    Primary osteoarthritis of both knees 06/14/2018    Irritable bowel syndrome with diarrhea 01/30/2018    Primary hypertension 01/30/2018    Type 2 diabetes mellitus with hyperglycemia, without long-term current use of insulin ([facility]) 01/30/2018    Vitamin B12 deficiency 03/08/2016    Vitamin D deficiency 03/08/2016    Morbid obesity ([facility]) 02/23/2016    Primary osteoarthritis of right knee 10/15/2013    Diabetic neuropathy ([facility]) 12/23/2023    Presence of CardioMEMS HF system 03/09/2022       Today's Plan:  Well compensated on exam today. CardioMEMS PAd reading of 12 on 01/09 (goal of 15-18). Continue current diuretic dosing.   Advised to complete BMP before next office visit. Pending renal function/BP, can consider uptitration of ARNi or MRA.     Plan:  Chronic HFpEF; LVEF 55%; LVIDd 6.0 cm; NYHA II; ACC/AHA Stage C   Etiology: Afib, HTN, obesity phenotype.   TTE 11/21/2021: LVEF 55%. LVIDd 6.0 cm. Grade 1 DD. Normal RV. Trace TR.     LHC 09/06/2022: no obstructive CAD.   TTE 04/11/2023: LVEF 50%. LVIDd 6.6 cm. Grade 2 DD.  "Normal RV. Trace MR and TR. Normal IVC.    RHC / MEMS calibration 10/11/2023: CardioMEMS data correlates to RHC.    Weight of 335 lbs on 12/29 (day of discharge) Today, weighs 338 lbs.    Most recent BMP from 12/29/2023: sodium 137; potassium 3.8; BUN 25; creatinine 1.86; eGFR 39.     Pharmacotherapies:  --ARNi / ACEi / ARB: Entresto 24-26 mg BID.   --Aldosterone Antagonist: spironolactone 25 mg daily.  --SGLT2 Inhibitor: empagliflozin 10 mg daily.  --Diuretic: Bumex 6 mg BID with potassium 40 mEq BID with PRN metolazone 5 mg.      Advanced Therapies:   --CardioMEMS: implanted 03/09/2022. Goal PAd of 15-18.    Atrial fibrillation, parosxysmal   [id number] = 3 (HF, HTN, DM).   Anticoagulation on Eliquis.    Rate control: metoprolol succinate 50 mg daily.   Rhythm control: No.    Hypertension   BP of 132/74 mmHg in office today.   Continues on medications as above.     Chronic kidney disease, stage IIIb   Baseline creatinine of 1.5-2.0.   Most recent BMP from 12/29/2023: sodium 137; potassium 3.8; BUN 25; creatinine 1.86; eGFR 39.     Hyperlipidemia  Asthma, severe persistent: follows with Dr. Noonan as outpatient.   Obstructive sleep apnea: without CPAP and equipment for >12 months. In process of getting new equipment.   Diabetes mellitus, type II  History of gastric bypass (Samuel-en-Y) surgery   History of tobacco abuse    HPI:   Mesfin Cano is a 58-year-old man with a PMH as above who presents to the office for hospital follow-up. Follows with Dr. Wilcox.     Admitted to [facility] from 12/23 to 12/29/2023 after presenting with worsening SOB with 10 lbs weight gain. . CXR with \"No acute cardiopulmonary disease.\" Advanced HF team consulted. Started on Bumex drip. Entresto added to regimen on 12/26. Transitioned to PO Bumex on 12/27. Lost 12 lbs this admission.     01/11/2024: Patient presents for hospital follow-up. No current complaints. Feeling well since returning home. Only complaint today of nasal " congestion (reports he has this every morning for years). Denies fevers and chills. Reports good response to Bumex and denies rapid weight gains on home scale since discharge. Has not required PRN metolazone. Is in the process of getting new CPAP equipment.     Past Medical History:   Diagnosis Date    Acute gastritis without hemorrhage     last assessed 3/24/17    Asthma     Atrial fibrillation (HCC)     Bilateral leg edema 02/19/2020    CHF (congestive heart failure) ([facility])     Coronary artery disease     Daytime sleepiness 07/17/2019    Diabetes mellitus ([facility])     Dyspnea on exertion 10/11/2021    Edema of both feet 01/23/2020    Gastric bypass status for obesity     Hyperlipidemia     Hypertension     Hypokalemia 11/14/2021    Impaired fasting glucose     last assessed 5/10/17    Knee sprain, bilateral 06/14/2018    Leg cramps 06/16/2022    Obesity     Viral gastroenteritis     last assessed 11/4/16       Review of Systems   Constitutional:  Negative for activity change, appetite change, chills, fatigue, fever and unexpected weight change.   HENT:  Positive for congestion.    Respiratory:  Negative for cough, chest tightness and shortness of breath.    Cardiovascular:  Negative for chest pain, palpitations and leg swelling.   Gastrointestinal:  Negative for abdominal distention, abdominal pain, diarrhea and nausea.   Genitourinary:  Negative for decreased urine volume, dysuria and urgency.   Musculoskeletal: Negative.    Skin: Negative.    Neurological:  Negative for dizziness, syncope, weakness and light-headedness.   Psychiatric/Behavioral:  Negative for confusion and sleep disturbance. The patient is not nervous/anxious.        Allergies   Allergen Reactions    Azithromycin Other (See Comments)     Shaky, uneasy feeling     Bactrim [Sulfamethoxazole-Trimethoprim] Other (See Comments)     shakiness         Current Outpatient Medications:     Accu-Chek FastClix Lancets MISC, Test blood sugar twice daily, Disp:  200 each, Rfl: 3    albuterol (PROVENTIL HFA,VENTOLIN HFA) 90 mcg/act inhaler, Inhale 2 puffs every 4 (four) hours as needed for wheezing or shortness of breath, Disp: 18 g, Rfl: 0    apixaban (Eliquis) 5 mg, Take 1 tablet (5 mg total) by mouth 2 (two) times a day, Disp: 60 tablet, Rfl: 5    atorvastatin (LIPITOR) 10 mg tablet, TAKE 1 TABLET BY MOUTH EVERY DAY, Disp: 90 tablet, Rfl: 3    Blood Glucose Monitoring Suppl (Accu-Chek Guide) w/Device KIT, Use 2 (two) times a day Test blood sugar twice daily, Disp: 1 kit, Rfl: 0    budesonide-formoterol (Symbicort) 160-4.5 mcg/act inhaler, Inhale 2 puffs 2 (two) times a day Rinse mouth after use., Disp: 30.6 g, Rfl: 2    bumetanide (BUMEX) 2 mg tablet, Take 3 tablets (6 mg total) by mouth 2 (two) times a day, Disp: 540 tablet, Rfl: 1    Empagliflozin (JARDIANCE) 10 MG TABS tablet, Take 1 tablet (10 mg total) by mouth daily, Disp: 30 tablet, Rfl: 11    fluticasone (FLONASE) 50 mcg/act nasal spray, 1 spray into each nostril 2 (two) times a day, Disp: , Rfl: 0    Klor-Con M20 20 MEQ tablet, TAKE 2 TABLETS BY MOUTH 2 TIMES A DAY., Disp: 360 tablet, Rfl: 2    loratadine (CLARITIN) 10 mg tablet, Take 1 tablet (10 mg total) by mouth daily Do not start before October 13, 2023., Disp: , Rfl: 0    metolazone (ZAROXOLYN) 2.5 mg tablet, Take 20-30 minutes before Bumex dose and with additional potassium.Take for weight gain of 2-3 lbs in 24 hours, 5lbs in one week or signs of worsening fluid retention., Disp: 90 tablet, Rfl: 0    metoprolol succinate (TOPROL-XL) 50 mg 24 hr tablet, Take 1 tablet (50 mg total) by mouth daily, Disp: 90 tablet, Rfl: 0    pantoprazole (PROTONIX) 40 mg tablet, Take 1 tablet (40 mg total) by mouth daily, Disp: 21 tablet, Rfl: 0    potassium chloride (K-DUR,KLOR-CON) 20 mEq tablet, Take 2 tablets (40 mEq total) by mouth 2 (two) times a day, Disp: 120 tablet, Rfl: 0    pregabalin (LYRICA) 50 mg capsule, Take 1 caps. at bedtime, Disp: 30 capsule, Rfl: 5     sacubitril-valsartan (Entresto) 24-26 MG TABS, Take 1 tablet by mouth 2 (two) times a day, Disp: 60 tablet, Rfl: 0    sitaGLIPtin (Januvia) 100 mg tablet, TAKE 1/2 TABLET DAILY (Patient taking differently: 50 mg daily TAKE 1/2 TABLET DAILY), Disp: 15 tablet, Rfl: 5    spironolactone (ALDACTONE) 25 mg tablet, Take 1 tablet (25 mg total) by mouth daily, Disp: 90 tablet, Rfl: 0    tiotropium (Spiriva Respimat) 1.25 MCG/ACT AERS inhaler, Inhale 2 puffs daily, Disp: 4 g, Rfl: 0    EPINEPHrine (EPIPEN) 0.3 mg/0.3 mL SOAJ, Inject 0.3 mL (0.3 mg total) into a muscle once for 1 dose, Disp: 0.6 mL, Rfl: 0    montelukast (SINGULAIR) 10 mg tablet, Take 1 tablet (10 mg total) by mouth daily at bedtime, Disp: 90 tablet, Rfl: 1    Social History     Socioeconomic History    Marital status: /Civil Union     Spouse name: Not on file    Number of children: Not on file    Years of education: Not on file    Highest education level: Not on file   Occupational History    Not on file   Tobacco Use    Smoking status: Former     Types: Pipe, Cigars     Start date: 2016     Quit date: 1/1/2021     Years since quitting: 3.0     Passive exposure: Never    Smokeless tobacco: Never    Tobacco comments:     cigar once a day   Vaping Use    Vaping status: Never Used   Substance and Sexual Activity    Alcohol use: Yes     Alcohol/week: 2.0 standard drinks of alcohol     Types: 2 Shots of liquor per week     Comment: social    Drug use: No    Sexual activity: Yes     Partners: Female     Birth control/protection: None   Other Topics Concern    Not on file   Social History Narrative    Not on file     Social Determinants of Health     Financial Resource Strain: Not on file   Food Insecurity: No Food Insecurity (12/27/2023)    Hunger Vital Sign     Worried About Running Out of Food in the Last Year: Never true     Ran Out of Food in the Last Year: Never true   Transportation Needs: No Transportation Needs (12/27/2023)    PRAPARE -  Transportation     Lack of Transportation (Medical): No     Lack of Transportation (Non-Medical): No   Physical Activity: Not on file   Stress: Not on file   Social Connections: Not on file   Intimate Partner Violence: Not on file   Housing Stability: High Risk (12/27/2023)    Housing Stability Vital Sign     Unable to Pay for Housing in the Last Year: Yes     Number of Places Lived in the Last Year: 1     Unstable Housing in the Last Year: No     Family History   Problem Relation Age of Onset    Stroke Mother     Hypertension Father     Cancer Father     COPD Father        Vitals:   Blood pressure 132/74, pulse 80, weight (!) 153 kg (338 lb), SpO2 97%.    Body mass index is 44.59 kg/m².    Wt Readings from Last 10 Encounters:   01/11/24 (!) 153 kg (338 lb)   12/29/23 (!) 152 kg (335 lb 6.4 oz)   12/15/23 (!) 157 kg (346 lb)   12/13/23 (!) 157 kg (346 lb 3.2 oz)   11/30/23 (!) 150 kg (330 lb)   11/07/23 (!) 152 kg (335 lb 12.8 oz)   10/27/23 (!) 154 kg (338 lb 9.6 oz)   10/20/23 (!) 154 kg (338 lb 9.6 oz)   10/12/23 (!) 154 kg (340 lb 6.4 oz)   10/05/23 (!) 158 kg (349 lb)     Vitals:    01/11/24 0842   BP: 132/74   Pulse: 80   SpO2: 97%   Weight: (!) 153 kg (338 lb)       Physical Exam  Vitals reviewed.   Constitutional:       General: He is awake. He is not in acute distress.     Appearance: Normal appearance. He is well-developed and overweight. He is not toxic-appearing or diaphoretic.   HENT:      Head: Normocephalic.      Nose: Nose normal.      Mouth/Throat:      Mouth: Mucous membranes are moist.   Eyes:      General: No scleral icterus.     Conjunctiva/sclera: Conjunctivae normal.   Neck:      Vascular: JVD: ~8-9 cm.      Trachea: No tracheal deviation.   Cardiovascular:      Rate and Rhythm: Normal rate and regular rhythm.      Heart sounds: No murmur heard.  Pulmonary:      Effort: Pulmonary effort is normal. No tachypnea, bradypnea or respiratory distress.      Breath sounds: Normal air entry. No  decreased air movement. Decreased breath sounds (distant) present. No wheezing.   Abdominal:      General: There is no distension.      Palpations: Abdomen is soft.      Tenderness: There is no abdominal tenderness.   Musculoskeletal:      Cervical back: Neck supple.      Right lower leg: Edema (trace) present.      Left lower leg: Edema (trace) present.   Skin:     General: Skin is warm and dry.      Coloration: Skin is pale. Skin is not jaundiced.   Neurological:      General: No focal deficit present.      Mental Status: He is alert and oriented to person, place, and time.   Psychiatric:         Attention and Perception: Attention normal.         Mood and Affect: Mood and affect normal.         Speech: Speech normal.         Behavior: Behavior normal. Behavior is cooperative.         Thought Content: Thought content normal.       Labs & Results:  Lab Results   Component Value Date    WBC 5.92 12/26/2023    HGB 9.3 (L) 12/26/2023    HCT 31.0 (L) 12/26/2023    MCV 87 12/26/2023     12/26/2023     Lab Results   Component Value Date    SODIUM 137 12/29/2023    K 3.8 12/29/2023     12/29/2023    CO2 30 12/29/2023    BUN 25 12/29/2023    CREATININE 1.86 (H) 12/29/2023    GLUC 200 (H) 12/29/2023    CALCIUM 7.7 (L) 12/29/2023     Lab Results   Component Value Date    INR 1.29 (H) 10/12/2023    PROTIME 15.9 (H) 10/12/2023      Lab Results   Component Value Date    NTBNP 697 (H) 04/10/2023      Lab Results   Component Value Date     (H) 12/23/2023      Aster García PA-C

## 2024-01-11 ENCOUNTER — OFFICE VISIT (OUTPATIENT)
Dept: CARDIOLOGY CLINIC | Facility: CLINIC | Age: 59
End: 2024-01-11
Payer: COMMERCIAL

## 2024-01-11 VITALS
DIASTOLIC BLOOD PRESSURE: 74 MMHG | WEIGHT: 315 LBS | OXYGEN SATURATION: 97 % | HEART RATE: 80 BPM | SYSTOLIC BLOOD PRESSURE: 132 MMHG | BODY MASS INDEX: 44.59 KG/M2

## 2024-01-11 DIAGNOSIS — I50.32 CHRONIC HEART FAILURE WITH PRESERVED EJECTION FRACTION (HCC): ICD-10-CM

## 2024-01-11 DIAGNOSIS — N18.32 STAGE 3B CHRONIC KIDNEY DISEASE (HCC): ICD-10-CM

## 2024-01-11 DIAGNOSIS — Z09 HOSPITAL DISCHARGE FOLLOW-UP: Primary | ICD-10-CM

## 2024-01-11 DIAGNOSIS — E11.22 TYPE 2 DIABETES MELLITUS WITH STAGE 3B CHRONIC KIDNEY DISEASE, WITHOUT LONG-TERM CURRENT USE OF INSULIN (HCC): ICD-10-CM

## 2024-01-11 DIAGNOSIS — Z95.818 PRESENCE OF CARDIOMEMS HF SYSTEM: Chronic | ICD-10-CM

## 2024-01-11 DIAGNOSIS — I48.0 PAROXYSMAL ATRIAL FIBRILLATION (HCC): ICD-10-CM

## 2024-01-11 DIAGNOSIS — N18.32 TYPE 2 DIABETES MELLITUS WITH STAGE 3B CHRONIC KIDNEY DISEASE, WITHOUT LONG-TERM CURRENT USE OF INSULIN (HCC): ICD-10-CM

## 2024-01-11 DIAGNOSIS — I10 PRIMARY HYPERTENSION: ICD-10-CM

## 2024-01-11 DIAGNOSIS — J45.41 MODERATE PERSISTENT ASTHMA WITH ACUTE EXACERBATION: ICD-10-CM

## 2024-01-11 PROCEDURE — 99214 OFFICE O/P EST MOD 30 MIN: CPT | Performed by: PHYSICIAN ASSISTANT

## 2024-01-11 PROCEDURE — 3066F NEPHROPATHY DOC TX: CPT | Performed by: INTERNAL MEDICINE

## 2024-01-11 RX ORDER — BUDESONIDE AND FORMOTEROL FUMARATE DIHYDRATE 160; 4.5 UG/1; UG/1
2 AEROSOL RESPIRATORY (INHALATION) 2 TIMES DAILY
Qty: 30.6 G | Refills: 2 | Status: SHIPPED | OUTPATIENT
Start: 2024-01-11

## 2024-01-11 RX ORDER — METOLAZONE 2.5 MG/1
5 TABLET ORAL AS NEEDED
Start: 2024-01-11

## 2024-01-11 NOTE — PATIENT INSTRUCTIONS
Please weigh yourself every day (after emptying your bladder) and keep a detailed log of weights.   Contact the Heart Failure program at [phone] if you gain 3+ lbs overnight or 5+ lbs in 5-7 days.  Limit daily sodium/salt intake to 2000 mg daily to prevent fluid retention.  Avoid canned foods, fast food/Chinese food, and processed meats (hot dogs, lunch meat, and sausage etc.). Caution with condiments.  Limit fluid intake to 2000 mL or 2 liters (about 60-65 ounces) daily.  Avoid electrolyte replacement drinks (such as Gatorade, Pedialyte, Propel, Liquid IV, etc.).  Bring complete list of medications and log of daily weights to your follow-up appointment.

## 2024-01-12 NOTE — PROGRESS NOTES
Advanced Heart Failure Outpatient Progress Note - Rhona Gowers 56 y o  male MRN: [id number]    Encounter: [id number]      Assessment/Plan:    Patient Active Problem List    Diagnosis Date Noted    Stage 3a chronic kidney disease ([facility] ) 09/16/2022    Chest pain and shortness of breath 09/01/2022    Paroxysmal atrial fibrillation ([facility] ) 03/01/2022    Chronic diastolic heart failure ([facility] ) 11/12/2021    Severe persistent asthma with acute exacerbation 10/11/2021    Hyponatremia 07/31/2021    Cardiomyopathy ([facility] ) 07/19/2021    HNP (herniated nucleus pulposus), lumbar 02/23/2021    Foraminal stenosis of lumbar region 02/23/2021    VICKY (obstructive sleep apnea)     Mixed hyperlipidemia 04/18/2019    Primary osteoarthritis of both knees 06/14/2018    Irritable bowel syndrome with diarrhea 01/30/2018    Essential hypertension 01/30/2018    Type 2 diabetes mellitus, without long-term current use of insulin ([place] [id number] ) 01/30/2018    Vitamin B12 deficiency 03/08/2016    Vitamin D deficiency 03/08/2016    Morbid obesity due to excess calories ([place] [id number] ) 02/23/2016    Primary osteoarthritis of right knee 10/15/2013     # Chronic HFpEF, Stage C, NYHA III   Euvolemic     Weight:  312 lbs 6/24/22; 231 lbs 9/21/22  NT proBNP: 609 4/8/22      Studies- personally reviewed by [facility] 9/6/22: No obstructive CAD    Pharm Nuc Stress 9/2/22: Abnormal study due to the presence of an inferior and inferolateral infarct without significant ischemia  [address] 3/9/22:   RA 4   RV 35/4   PA 35/12/21   PCWP 10   PA sat 64%   Travis CO 5 56 L/min   Travis CI 2 2L/min/m2   PVR 1 97 SAGASTUME     TTE 03/25/2020: LVEF 40-45%  LVIDd 6 12 cm  Normal RV     TTE 07/19/2021: LVEF 50%  LVIDd 6 13 cm  Grade 1 DD  Normal RV  Trace MR and TR  Mildly dilated IVC  TTE 11/12/2021: LVEF 55%  LVIDd 6 0 cm  Grade 1 DD  Normal RV  Trace TR        Neurohormonal Blockade:   --Beta Blocker: metoprolol succinate 50 mg daily     --ARNi / ACEi / ARB: No     --Aldosterone Hpi Title: Evaluation of a Skin Lesion Antagonist: No     --SGLT2 Inhibitor: No    --Diuretic: bumex 3mg BID, postassium 40 meq BID    Sudden Cardiac Death Risk Reduction:   --LVEF >35%         Electrical Resynchronization:   --Candidacy for BiV device: narrow QRS       Advanced Therapies:   Will continue to monitor  LVEF recovered      # Atrial fibrillation   [id number] = 3 (HF, HTN, DM)  Diagnosed 02/2022  Anticoagulation on Eliquis  Rate control: BB as above  Rhythm control: No       # Hypertension, controlled       # Hyperlipidemia   10/01/2021: cholesterol 157; TG 90; HDL 66; calculated LDL 73  9/5/22:  HDL 45 LDL 90  9/19/22:  HDL 64  - off lipitor for a few days  Rx: atorvastatin 10 mg daily         # Diabetes mellitus, type II     Non-insulin dependent  HbA1c 6/16/22: 5 7%      # Obstructive sleep apnea, awaiting CPAP  # Chronic respiratory failure   # Asthma, moderate persistent    # S/p gastric bypass (Samuel-en-Y)surgery    # History of tobacco abuse   # Carpal tunnel syndrome, bilateral    # CKD, Cr 1 43   # s/p CardioMems implant 3/9/22     TODAY'S PLAN:  Continue current medications  Continue rehab  2g sodium diet  2L fluid restriction  Daily weights  Follow up on CPAP        HPI:   Allison Gibson is a 61-year-old man with a PMH as above who presents to the office for follow-up      Admitted to [facility] from 12/23 to 01/01/2022 after presenting with worsening SOB  Started on IV Lasix (later switched to IV Bumex) and IV steroids  Did receive 2 doses of metolazone while inpatient  Transitioned to PO torsemide on day of discharge  Lost 12 lbs and net negative 15 L this admission       Presented to [facility]- ED on 02/12/2022 with worsening SOB  Diagnosed with Afib and was started on Eliquis and BB  Also received 80 mg IV Lasix, and was discharged home       02/22/2022: Patient presents for hospital follow-up  Has been feeling "good" until yesterday  Developed TELLES and noted orthopnea overnight   Also with recently worsening of cough resulting in increased use of PRN inhalers/nebulizers  Denies recent dietary indiscretion  Drinking no more than 2000 mL daily  Is completing daily weights; reports down-trending weights over past few days  Has noted slight decrease in response to PO torsemide       Admitted to [facility] from 03/01 to 03/10/2022 after presenting with worsening SOB and LE swelling  Started on IV Lasix and IV steroids  Later was switched to IV Bumex on 03/04  Transitioned to PO Bumex on 03/07  CardioMEMS implanted on 03/09  Lost 7 lbs and net negative 8 1 L this admission       03/14/2022: Patient presents for hospital follow-up  No current complaints  Weights stable at home in low 310s lbs range  No issues with CardioMEMS system at home  Still waiting for new CPAP machine       Presented to [facility] ED on 04/08 at the direction of his pulmonologist due to worsening SOB and TELLES, productive cough, and wheezing  In ED received albuterol nebulizer treatment  CXR without acute cardiopulmonary disease  Heart failure team recommended increasing Bumex to 2 mg qAM and 1 mg qPM with close outpatient follow-up       Contacted by HF RN on 04/08 to review diuretic dosing changes  Patient reported no improvement since ED visit and presented to [address] [place] [address] ED for second opinion/further evaluation  Admitted to [address] [place] [address] from 04/08 to 04/11/2022  Started on IV steroids and IV Lasix  Diagnosed with acute asthma and HF exacerbations  Lost 2 lbs this admission  Discharged on PO steroid taper      04/15/2022: Patient presents for hospital follow-up appointment  Reviewed recent hospital course(s)  No current complaints  Has continued taking Bumex 2 mg qAm and 1 mg qPM  Is completing daily weights and denies any significant weight gain since returning home  Still waiting on having new CPAP machine delivered  Planning to return to work next week    6/24/22: Here for follow up  Overall doing well   Recent treatments for asthma exacerbation  Last steroid course 3 weeks ago  He is overall doing well  No shortness of breath, PND or orthopnea  Mild lower extremity edema  Home scale not working  Recent CardioMems PADs trending up     7/28/22: Here for urgent visit due to worsening shortness of breath, weight gain and lower extremity edema  Noted symptoms since 7/25  Was getting extra doses of bumex with up to 2mg TID yesterday with no significant response  Correlates with PADs above goal  Reports adherence to diet and meds  Interval History:  9/21/22: Here for follow up  Admitted for chest pain and shortness of breath 9/1/22  Euvolemic on exam  Troponins negative  Pharmacological nuclear stress test showed inferior inferolateral infarct without significant ischemia  Cardiac catheterization done showed no obstructive CAD  Also treated for asthma exacerbation and given course of steroids with improvement in symptoms  He is overall feeling well today, breathing better and doing rehab  Review of Systems   Constitutional: Negative for chills and fever  HENT: Negative for ear pain and sore throat  Eyes: Negative for pain and visual disturbance  Respiratory: Negative for cough and shortness of breath  Cardiovascular: Negative for chest pain and palpitations  Gastrointestinal: Negative for abdominal distention, abdominal pain and vomiting  Genitourinary: Negative for dysuria and hematuria  Musculoskeletal: Negative for arthralgias and back pain  Skin: Negative for color change and rash  Neurological: Negative for dizziness, seizures, syncope and light-headedness  All other systems reviewed and are negative        Past Medical History:   Diagnosis Date    Acute gastritis without hemorrhage     last assessed 3/24/17    Asthma     Bilateral leg edema 2/19/2020    [facility]     Daytime sleepiness 7/17/2019    Diabetes mellitus ([facility])     Dyspnea on exertion 10/11/2021    Edema of both feet 1/23/2020    Gastric bypass status for obesity     Hyperlipidemia     Hypertension     Hypokalemia 11/14/2021    Impaired fasting glucose     last assessed 5/10/17    Knee sprain, bilateral 6/14/2018    Leg cramps 6/16/2022    Obesity     Viral gastroenteritis     last assessed 11/4/16         Allergies   Allergen Reactions    Azithromycin Other (See Comments)     Shaky, uneasy feeling     Bactrim [Sulfamethoxazole-Trimethoprim] Other (See Comments)     shakiness           Current Outpatient Medications:     Accu-Chek FastClix Lancets MISC, Test blood sugar twice daily, Disp: 200 each, Rfl: 3    albuterol (PROVENTIL HFA,VENTOLIN HFA) 90 mcg/act inhaler, INHALE 2 PUFFS EVERY 4 (FOUR) HOURS AS NEEDED FOR WHEEZING OR SHORTNESS OF BREATH, Disp: 18 g, Rfl: 3    amLODIPine (NORVASC) 5 mg tablet, Take 1 tablet (5 mg total) by mouth daily, Disp: 30 tablet, Rfl: 0    apixaban (Eliquis) 5 mg, Take 1 tablet (5 mg total) by mouth 2 (two) times a day, Disp: 60 tablet, Rfl: 5    atorvastatin (LIPITOR) 10 mg tablet, Take 1 tablet (10 mg total) by mouth in the morning , Disp: 30 tablet, Rfl: 5    benzonatate (TESSALON PERLES) 100 mg capsule, Take 1 capsule (100 mg total) by mouth 3 (three) times a day as needed (for cough), Disp: 30 capsule, Rfl: 0    Blood Glucose Monitoring Suppl (Accu-Chek Guide) w/Device KIT, Use 2 (two) times a day Test blood sugar twice daily, Disp: 1 kit, Rfl: 0    bumetanide (BUMEX) 1 mg tablet, Take 3 tablets (3 mg total) by mouth 2 (two) times a day, Disp: 180 tablet, Rfl: 3    levalbuterol (Xopenex) 1 25 mg/3 mL nebulizer solution, Take 3 mL (1 25 mg total) by nebulization 3 (three) times a day (Patient taking differently: Take 1 25 mg by nebulization as needed), Disp: 270 mL, Rfl: 3    metoprolol succinate (TOPROL-XL) 50 mg 24 hr tablet, Take 1 tablet (50 mg total) by mouth daily, Disp: 90 tablet, Rfl: 1    montelukast (SINGULAIR) 10 mg tablet, Take 1 tablet (10 mg total) by mouth daily at bedtime, Disp: 90 tablet, Rfl: 1    potassium chloride (K-DUR,KLOR-CON) 20 mEq tablet, Take 2 tablets (40 mEq total) by mouth 2 (two) times a day, Disp: 120 tablet, Rfl: 5    sitaGLIPtin (Januvia) 100 mg tablet, Take 1/2 tab  daily, Disp: 30 tablet, Rfl: 5    Symbicort 160-4 5 MCG/ACT inhaler, 2 PUFFS BY MOUTH TWICE DAILY , RINSE MOUTH THEN SPIT AFTER USE, Disp: 30 6 g, Rfl: 2    tiotropium (Spiriva Respimat) 1 25 MCG/ACT AERS inhaler, Inhale 2 puffs daily, Disp: 4 g, Rfl: 3    Social History     Socioeconomic History    Marital status: /Civil Union     Spouse name: Not on file    Number of children: Not on file    Years of education: Not on file    Highest education level: Not on file   Occupational History    Not on file   Tobacco Use    Smoking status: Former Smoker     Packs/day: 0 01     Years: 11 00     Pack years: 0 11     Types: Pipe, Cigars     Start date:      Quit date:      Years since quittin 7    Smokeless tobacco: Never Used    Tobacco comment: cigar once a day   Vaping Use    Vaping Use: Never used   Substance and Sexual Activity    Alcohol use: Not Currently     Alcohol/week: 2 0 standard drinks     Types: 2 Shots of liquor per week     Comment: social    Drug use: No    Sexual activity: Yes     Partners: Female     Birth control/protection: None   Other Topics Concern    Not on file   Social History Narrative    Not on file     Social Determinants of Health     Financial Resource Strain: Not on file   Food Insecurity: No Food Insecurity    Worried About Running Out of Food in the Last Year: Never true    Aria of Food in the Last Year: Never true   Transportation Needs: No Transportation Needs    Lack of Transportation (Medical): No    Lack of Transportation (Non-Medical):  No   Physical Activity: Not on file   Stress: Not on file   Social Connections: Not on file   Intimate Partner Violence: Not on file   Housing Stability: Low Risk     Unable to Pay for Housing in the Last Year: No    Number of Places Lived in the Last Year: 1    Unstable Housing in the Last Year: No       Family History   Problem Relation Age of Onset   [facility] Stroke Mother     Hypertension Father     Cancer Father     COPD Father        Physical Exam:    Vitals:   Vitals:    09/21/22 0820   BP: 104/62   Pulse: 82   SpO2: 98%       Physical Exam  Constitutional:       General: He is not in acute distress  Appearance: Normal appearance  HENT:      Head: Normocephalic and atraumatic  Mouth/Throat:      Mouth: Mucous membranes are moist    Eyes:      General: No scleral icterus  Extraocular Movements: Extraocular movements intact  Cardiovascular:      Rate and Rhythm: Normal rate and regular rhythm  Pulses: Normal pulses  Heart sounds: S1 normal and S2 normal  No murmur heard  No friction rub  No gallop  Comments: Nonelevated JVP  Pulmonary:      Effort: Pulmonary effort is normal       Breath sounds: Normal breath sounds  Abdominal:      General: There is no distension  Palpations: Abdomen is soft  Tenderness: There is no abdominal tenderness  There is no guarding or rebound  Musculoskeletal:         General: Normal range of motion  Cervical back: Neck supple  Right lower leg: No edema  Left lower leg: No edema  Skin:     General: Skin is warm and dry  Capillary Refill: Capillary refill takes less than 2 seconds  Neurological:      General: No focal deficit present  Mental Status: He is alert and oriented to person, place, and time     Psychiatric:         Mood and Affect: Mood normal          Labs & Results:    Lab Results   Component Value Date    SODIUM 136 09/19/2022    K 4 2 09/19/2022     09/19/2022    CO2 28 09/19/2022    BUN 22 09/19/2022    CREATININE 1 53 (H) 09/19/2022    GLUC 158 (H) 09/07/2022    CALCIUM 8 7 09/19/2022     Lab Results   Component Value Date    WBC 9 43 09/07/2022    HGB 11 3 (L) 09/07/2022    HCT 35 0 (L) 09/07/2022    MCV 91 09/07/2022     09/07/2022     Lab Results   Component Value Date    NTBNP 2,545 (H) 09/01/2022      Lab Results   Component Value Date    CHOLESTEROL 235 (H) 09/19/2022    CHOLESTEROL 163 09/05/2022    CHOLESTEROL 157 10/01/2021     Lab Results   Component Value Date    HDL 64 09/19/2022    HDL 45 09/05/2022    HDL 66 10/01/2021     Lab Results   Component Value Date    TRIG 198 (H) 09/19/2022    TRIG 138 09/05/2022    TRIG 90 10/01/2021     Lab Results   Component Value Date    [place] 171 09/19/2022    [place] 118 09/05/2022    [place] 91 10/01/2021       EKG personally reviewed by Davina Guido MD      Counseling / Coordination of Care  Time spent today 25 minutes  Greater than 50% of total time was spent with the patient and / or family counseling and / or coordination of care  We went over current diagnosis, most recent studies and any changes in treatment  Thank you for the opportunity to participate in the care of this patient      Pavel Melchor MD  ADVANCED HEART FAILURE AND MECHANICAL CIRCULATORY SUPPORT  [facility] Yes

## 2024-01-15 ENCOUNTER — TELEPHONE (OUTPATIENT)
Dept: CARDIOLOGY CLINIC | Facility: CLINIC | Age: 59
End: 2024-01-15

## 2024-01-26 ENCOUNTER — TELEPHONE (OUTPATIENT)
Dept: CARDIOLOGY CLINIC | Facility: CLINIC | Age: 59
End: 2024-01-26

## 2024-01-26 NOTE — TELEPHONE ENCOUNTER
Called patient to F/U due to Cardiomem's readings  that were low on 1/24 - 11, & on 1/25 - 9.     Patient stated he feels really well. Denied any increase in SOB. Denied chest pain or discomfort.    Denied any LLE.    Stated taking his prescribed medications as ordered.

## 2024-01-28 DIAGNOSIS — J45.20 MILD INTERMITTENT ASTHMA WITHOUT COMPLICATION: ICD-10-CM

## 2024-01-28 DIAGNOSIS — E11.42 DIABETIC POLYNEUROPATHY ASSOCIATED WITH TYPE 2 DIABETES MELLITUS (HCC): ICD-10-CM

## 2024-01-29 RX ORDER — PREGABALIN 50 MG/1
CAPSULE ORAL
Qty: 30 CAPSULE | Refills: 5 | Status: ON HOLD | OUTPATIENT
Start: 2024-01-29

## 2024-01-29 RX ORDER — ALBUTEROL SULFATE 90 UG/1
2 AEROSOL, METERED RESPIRATORY (INHALATION) EVERY 4 HOURS PRN
Qty: 18 G | Refills: 5 | Status: ON HOLD | OUTPATIENT
Start: 2024-01-29

## 2024-01-30 ENCOUNTER — OFFICE VISIT (OUTPATIENT)
Dept: CARDIOLOGY CLINIC | Facility: CLINIC | Age: 59
End: 2024-01-30
Payer: COMMERCIAL

## 2024-01-30 VITALS
BODY MASS INDEX: 41.75 KG/M2 | DIASTOLIC BLOOD PRESSURE: 58 MMHG | WEIGHT: 315 LBS | HEART RATE: 99 BPM | OXYGEN SATURATION: 99 % | SYSTOLIC BLOOD PRESSURE: 102 MMHG | HEIGHT: 73 IN

## 2024-01-30 DIAGNOSIS — N18.32 STAGE 3B CHRONIC KIDNEY DISEASE (HCC): ICD-10-CM

## 2024-01-30 DIAGNOSIS — I50.32 CHRONIC HEART FAILURE WITH PRESERVED EJECTION FRACTION (HCC): Primary | ICD-10-CM

## 2024-01-30 DIAGNOSIS — G47.33 OBSTRUCTIVE SLEEP APNEA: ICD-10-CM

## 2024-01-30 DIAGNOSIS — Z95.818 PRESENCE OF CARDIOMEMS HF SYSTEM: ICD-10-CM

## 2024-01-30 PROCEDURE — 99214 OFFICE O/P EST MOD 30 MIN: CPT | Performed by: INTERNAL MEDICINE

## 2024-01-30 PROCEDURE — 3078F DIAST BP <80 MM HG: CPT | Performed by: INTERNAL MEDICINE

## 2024-01-30 PROCEDURE — 3074F SYST BP LT 130 MM HG: CPT | Performed by: INTERNAL MEDICINE

## 2024-01-30 NOTE — PROGRESS NOTES
Advanced Heart Failure Outpatient Progress Note - Mesfin Cano 58 y.o. male MRN: [id number]    Encounter: [id number]      Assessment/Plan:    Patient Active Problem List    Diagnosis Date Noted    Diabetic neuropathy ([facility]) 12/23/2023    Eosinophilia 10/18/2023    Anemia due to chronic kidney disease 10/16/2023    Nasal congestion 10/06/2023    Loose stools 04/17/2023    Chronic diastolic CHF ([facility]) 04/10/2023    Diabetic polyneuropathy associated with type 2 diabetes mellitus ([facility]) 12/20/2022    CKD (chronic kidney disease) stage 3 ([facility]) 09/16/2022    Presence of CardioMEMS HF system 03/09/2022    Paroxysmal atrial fibrillation ([facility]) 03/01/2022    Acute on chronic heart failure with preserved ejection fraction (HFpEF) ([facility]) 11/12/2021    Severe persistent asthma 10/11/2021    Hyponatremia 07/31/2021    HNP (herniated nucleus pulposus), lumbar 02/23/2021    Foraminal stenosis of lumbar region 02/23/2021    VICKY (obstructive sleep apnea)     Hyperlipidemia 04/18/2019    Primary osteoarthritis of both knees 06/14/2018    Irritable bowel syndrome with diarrhea 01/30/2018    Primary hypertension 01/30/2018    Type 2 diabetes mellitus with hyperglycemia, without long-term current use of insulin ([facility]) 01/30/2018    Vitamin B12 deficiency 03/08/2016    Vitamin D deficiency 03/08/2016    Morbid obesity ([facility]) 02/23/2016    Primary osteoarthritis of right knee 10/15/2013     # Chronic HFpEF, Stage C, NYHA III   History of cardiomyopathy with mid range EF in 2020.  Euvolemic on exam     Weight:  349 lbs 10/5/23; 338 lbs 1/14/24; 334 lbs 1/30/24  BNP: 245 12/23/23  152 12/15/23      Studies- personally reviewed by me       RHC 10/11/23: CardioMEMS data correlates to RHC.   -140 during case  HR 74  O2 sat 97%  Hgb 10.5     RA 8  RV 30/8  PA 28/12, 17  PCWP 10-12 (multiple checks)  TPG 5  PA sat 59% (multiple checks)  Travis CO/CI: 6.6/2.3  TD CO/CI: 7.43/2.78     Simultaneous CardioMEMS data:  Mean: 26/10/15  End expiration  on MEMS waveform: 28/10    LHC 9/6/22: No obstructive CAD    Pharm Nuc Stress 9/2/22: Abnormal study due to the presence of an inferior and inferolateral infarct without significant ischemia.    RHC 3/9/22:   RA 4   RV 35/4   PA 35/12/21   PCWP 10   PA sat 64%   Travis CO 5.56 L/min   Travis CI 2.2L/min/m2   PVR 1.97 SAGASTUME     TTE 03/25/2020: LVEF 40-45%. LVIDd 6.12 cm. Normal RV.    TTE 07/19/2021: LVEF 50%. LVIDd 6.13 cm. Grade 1 DD. Normal RV. Trace MR and TR. Mildly dilated IVC.    TTE 11/12/2021: LVEF 55%. LVIDd 6.0 cm. Grade 1 DD. Normal RV. Trace TR.      Neurohormonal Blockade:   --Beta Blocker: metoprolol succinate 50 mg daily.    --ARNi / ACEi / ARB: entresto 24-26mg BID  --Aldosterone Antagonist: spironolactone 25mg daily  --SGLT2 Inhibitor: Jardiance 10mg daily  --Diuretic: bumex 6mg BID with potassium 40 meq BID with prn metolazone 5mg with additional 40 meq of potassium     Sudden Cardiac Death Risk Reduction:   --LVEF >35%.       Electrical Resynchronization:   --Candidacy for BiV device: narrow QRS.        Advanced Therapies:   Will continue to monitor. LVEF recovered      # Atrial fibrillation   [id number] = 3 (HF, HTN, DM).   Diagnosed 02/2022.   Anticoagulation on Eliquis.    Rate control: BB as above.   Rhythm control: No.      # Hypertension, controlled   Rx: as above      # Hyperlipidemia   11/22/22:   HDL 55 LDL 85  Rx: atorvastatin 10 mg daily.        # Diabetes mellitus, type II     Non-insulin dependent.   HbA1c 6/16/22: 5.7%      # Obstructive sleep apnea, without CPAP and equipment for >12 months. In process of getting new equipment.   # Chronic respiratory failure   # Asthma, severe persistent ,  follows with Dr. Noonan as outpatient.   # H/o gastric bypass ([facility]-Y)surgery    # History of tobacco abuse   # CKD 3b, recent baseline 1.5-2, 1.86 12/29/23  # s/p CardioMems implant 3/9/22     TODAY'S PLAN:  Euvolemic on exam, PAD at goal  Continue current medications  Obtain labs as  "ordered  Daily CardioMems transmissions  2g sodium diet  2L fluid restriction  Daily weights  Follow up on CPAP machine  Given $10 dollar coupon for entresto today, patient will let us know if with issues with eligibility/cost remains an issue, will plan ton switching entresto to losartan then        HPI:   Mesfin Cano is a 58 y.o. man with a PMH as above who presents to the office for follow-up.     Admitted to [facility] from 12/23 to 01/01/2022 after presenting with worsening SOB. Started on IV Lasix (later switched to IV Bumex) and IV steroids. Did receive 2 doses of metolazone while inpatient. Transitioned to PO torsemide on day of discharge. Lost 12 lbs and net negative 15 L this admission.      Presented to [facility] ED on 02/12/2022 with worsening SOB. Diagnosed with Afib and was started on Eliquis and BB. Also received 80 mg IV Lasix, and was discharged home.      02/22/2022: Patient presents for hospital follow-up. Has been feeling \"good\" until yesterday. Developed TELLES and noted orthopnea overnight. Also with recently worsening of cough resulting in increased use of PRN inhalers/nebulizers. Denies recent dietary indiscretion. Drinking no more than 2000 mL daily. Is completing daily weights; reports down-trending weights over past few days. Has noted slight decrease in response to PO torsemide.      Admitted to [facility] from 03/01 to 03/10/2022 after presenting with worsening SOB and LE swelling. Started on IV Lasix and IV steroids. Later was switched to IV Bumex on 03/04. Transitioned to PO Bumex on 03/07. CardioMEMS implanted on 03/09. Lost 7 lbs and net negative 8.1 L this admission.      03/14/2022: Patient presents for hospital follow-up. No current complaints. Weights stable at home in low 310s lbs range. No issues with CardioMEMS system at home. Still waiting for new CPAP machine.      Presented to [facility] ED on 04/08 at the direction of his pulmonologist due to worsening SOB and " TELLES, productive cough, and wheezing. In ED received albuterol nebulizer treatment. CXR without acute cardiopulmonary disease. Heart failure team recommended increasing Bumex to 2 mg qAM and 1 mg qPM with close outpatient follow-up.      Contacted by HF RN on 04/08 to review diuretic dosing changes. Patient reported no improvement since ED visit and presented to [facility] ED for second opinion/further evaluation. Admitted to [facility] from 04/08 to 04/11/2022. Started on IV steroids and IV Lasix. Diagnosed with acute asthma and HF exacerbations. Lost 2 lbs this admission. Discharged on PO steroid taper.     04/15/2022: Patient presents for hospital follow-up appointment. Reviewed recent hospital course(s). No current complaints. Has continued taking Bumex 2 mg qAm and 1 mg qPM. Is completing daily weights and denies any significant weight gain since returning home. Still waiting on having new CPAP machine delivered. Planning to return to work next week    6/24/22: Here for follow up. Overall doing well. Recent treatments for asthma exacerbation. Last steroid course 3 weeks ago. He is overall doing well. No shortness of breath, PND or orthopnea. Mild lower extremity edema. Home scale not working. Recent CardioMems PADs trending up.    7/28/22: Here for urgent visit due to worsening shortness of breath, weight gain and lower extremity edema. Noted symptoms since 7/25. Was getting extra doses of bumex with up to 2mg TID yesterday with no significant response. Correlates with PADs above goal. Reports adherence to diet and meds.    9/21/22: Here for follow up. Admitted for chest pain and shortness of breath 9/1/22. Euvolemic on exam. Troponins negative.  Pharmacological nuclear stress test showed inferior inferolateral infarct without significant ischemia.  Cardiac catheterization done showed no obstructive CAD. Also treated for asthma exacerbation and given course of steroids with improvement in symptoms. He  "is overall feeling well today, breathing better and doing rehab.    12/28/22: Here for follow up. Overall doing well. Last dose of additional bumex last month. Recent uptrend in weight related to diet. No shortness of breath, orthopnea or chest pain. No recent asthma exacerbations. Stable lower extremity edema.  PAD today 16, goal 15.    3/15/23: Here for follow up. Overall doing well until this week when he was noting more shortness of breath, wheezing worse with laying down, and leg swelling. Increased dose of bumex to 4mg BID with no significant response. Reports home scale unreliable.    Per MJ:  PAd elevated on 07/03; advised to take metolazone 2.5 x1 and increase Bumex to 4 mg BID. Advised again to take metolazone 2.5 mg x1 on 07/14/2023 (PAd of 20).     07/18/2023: Patient presents for an acute visit. Up 22 lbs weight gain since being discharged in 04/2023 with reported 6 lbs gain overnight on home scale. Reports worsening TELLES, bendopnea, LE swelling and fatigue. Feeling winded when showering. Continues with chronic orthopnea; denies PND. Reports losing only 1-2 lbs after taking 2.5 mg metolazone. Denies recent illness or recent dietary indiscretions.      Admitted to [facility] from 07/23 to 07/28/2023 after presenting with worsening SOB/TELLES, weight gain, and decreased urinary response.  (no priors). CXR with \"slight increased pulmonary vascular congestion.\" Started on IV Lasix, and cardiology consulted. Switched to Bumex drip on 07/24. Transitioned to PO Bumex on 07/26. MEMS pillow brought in from home. PAd reading of 13 on 07/27; PO Bumex dose decreased to 5 mg BID. PAd of 16 on 07/28. Lost 6 lbs this admission.      08/04/2023: Patient presents for hospital follow-up. Feeling well. Weights stable on home scale at 342 lbs. Has not required/taken metolazone since being home. Denies TELLES, LE swelling, PND, and orthopnea.       08/23/2023: Patient presents for follow-up. No current complaints. " "Weights stable on home scale. Has not required PRN metolazone since last visit. Denies SOB, TELLES, abdominal distention, LE swelling, PND, and orthopnea.      10/5/23: Here for follow up. Reports increasing shortness of breath, leg swelling and weight up 6 lbs over the past 3 days. Took metolazone 2.5mg daily with no good response. No dizziness or lightheadedness    Per MJ: \"Admitted to [facility] from 12/23 to 12/29/2023 after presenting with worsening SOB with 10 lbs weight gain. . CXR with \"No acute cardiopulmonary disease.\" Advanced HF team consulted. Started on Bumex drip. Entresto added to regimen on 12/26. Transitioned to PO Bumex on 12/27. Lost 12 lbs this admission.      01/11/2024: Patient presents for hospital follow-up. No current complaints. Feeling well since returning home. Only complaint today of nasal congestion (reports he has this every morning for years). Denies fevers and chills. Reports good response to Bumex and denies rapid weight gains on home scale since discharge. Has not required PRN metolazone. Is in the process of getting new CPAP equipment.\"      1/30/24: Here for follow-up.  Reports overall doing well.  Took metolazone this morning 5mg weight up 3 lbs from yesterday.  PAD this morning when CardioMEMS 12 mmHg.  Recently between 11-13, goal 15.  No worsening leg swelling or abdominal distention.  No worsening shortness of breath on exertion.  Adherent to medications.  Still working on getting CPAP.         Review of Systems   Constitutional:  Negative for chills and fever.   HENT:  Negative for ear pain and sore throat.    Eyes:  Negative for pain and visual disturbance.   Respiratory:  Negative for cough, shortness of breath and wheezing.    Cardiovascular:  Negative for chest pain, palpitations and leg swelling.   Gastrointestinal:  Negative for abdominal distention, abdominal pain and vomiting.   Genitourinary:  Negative for dysuria and hematuria.   Musculoskeletal:  Negative " for arthralgias and back pain.   Skin:  Negative for color change and rash.   Neurological:  Negative for dizziness, seizures, syncope and light-headedness.   All other systems reviewed and are negative.      Past Medical History:   Diagnosis Date    Acute gastritis without hemorrhage     last assessed 3/24/17    Asthma     Atrial fibrillation ([facility])     Bilateral leg edema 02/19/2020    CHF (congestive heart failure) (H[facility])     Coronary artery disease     Daytime sleepiness 07/17/2019    Diabetes mellitus ([facility])     Dyspnea on exertion 10/11/2021    Edema of both feet 01/23/2020    Gastric bypass status for obesity     Hyperlipidemia     Hypertension     Hypokalemia 11/14/2021    Impaired fasting glucose     last assessed 5/10/17    Knee sprain, bilateral 06/14/2018    Leg cramps 06/16/2022    Obesity     Viral gastroenteritis     last assessed 11/4/16         Allergies   Allergen Reactions    Azithromycin Other (See Comments)     Shaky, uneasy feeling     Bactrim [Sulfamethoxazole-Trimethoprim] Other (See Comments)     shakiness     .    Current Outpatient Medications:     Accu-Chek FastClix Lancets MISC, Test blood sugar twice daily, Disp: 200 each, Rfl: 3    albuterol (PROVENTIL HFA,VENTOLIN HFA) 90 mcg/act inhaler, Inhale 2 puffs every 4 (four) hours as needed for wheezing or shortness of breath, Disp: 18 g, Rfl: 5    apixaban (Eliquis) 5 mg, Take 1 tablet (5 mg total) by mouth 2 (two) times a day, Disp: 60 tablet, Rfl: 5    atorvastatin (LIPITOR) 10 mg tablet, TAKE 1 TABLET BY MOUTH EVERY DAY, Disp: 90 tablet, Rfl: 3    Blood Glucose Monitoring Suppl (Accu-Chek Guide) w/Device KIT, Use 2 (two) times a day Test blood sugar twice daily, Disp: 1 kit, Rfl: 0    budesonide-formoterol (Symbicort) 160-4.5 mcg/act inhaler, Inhale 2 puffs 2 (two) times a day Rinse mouth after use., Disp: 30.6 g, Rfl: 2    bumetanide (BUMEX) 2 mg tablet, Take 3 tablets (6 mg total) by mouth 2 (two) times a day, Disp: 540 tablet, Rfl: 1     Empagliflozin (JARDIANCE) 10 MG TABS tablet, Take 1 tablet (10 mg total) by mouth daily, Disp: 30 tablet, Rfl: 11    fluticasone (FLONASE) 50 mcg/act nasal spray, 1 spray into each nostril 2 (two) times a day, Disp: , Rfl: 0    Klor-Con M20 20 MEQ tablet, TAKE 2 TABLETS BY MOUTH 2 TIMES A DAY., Disp: 360 tablet, Rfl: 2    loratadine (CLARITIN) 10 mg tablet, Take 1 tablet (10 mg total) by mouth daily Do not start before October 13, 2023., Disp: , Rfl: 0    metolazone (ZAROXOLYN) 2.5 mg tablet, Take 2 tablets (5 mg total) by mouth if needed (weight gain of 3+ lbs in 24 hours, 5+ lbs in one week or signs of worsening fluid retention) Take 20-30 minutes before Bumex dose and with additional potassium, Disp: , Rfl:     metoprolol succinate (TOPROL-XL) 50 mg 24 hr tablet, Take 1 tablet (50 mg total) by mouth daily, Disp: 90 tablet, Rfl: 0    pantoprazole (PROTONIX) 40 mg tablet, Take 1 tablet (40 mg total) by mouth daily, Disp: 21 tablet, Rfl: 0    potassium chloride (K-DUR,KLOR-CON) 20 mEq tablet, Take 2 tablets (40 mEq total) by mouth 2 (two) times a day, Disp: 120 tablet, Rfl: 0    pregabalin (LYRICA) 50 mg capsule, Take 1 caps. at bedtime, Disp: 30 capsule, Rfl: 5    sacubitril-valsartan (Entresto) 24-26 MG TABS, Take 1 tablet by mouth 2 (two) times a day, Disp: 60 tablet, Rfl: 0    sitaGLIPtin (Januvia) 100 mg tablet, TAKE 1/2 TABLET DAILY (Patient taking differently: 50 mg daily TAKE 1/2 TABLET DAILY), Disp: 15 tablet, Rfl: 5    spironolactone (ALDACTONE) 25 mg tablet, Take 1 tablet (25 mg total) by mouth daily, Disp: 90 tablet, Rfl: 0    tiotropium (Spiriva Respimat) 1.25 MCG/ACT AERS inhaler, Inhale 2 puffs daily, Disp: 4 g, Rfl: 0    EPINEPHrine (EPIPEN) 0.3 mg/0.3 mL SOAJ, Inject 0.3 mL (0.3 mg total) into a muscle once for 1 dose, Disp: 0.6 mL, Rfl: 0    montelukast (SINGULAIR) 10 mg tablet, Take 1 tablet (10 mg total) by mouth daily at bedtime, Disp: 90 tablet, Rfl: 1    Social History     Socioeconomic  History    Marital status: /Civil Union     Spouse name: Not on file    Number of children: Not on file    Years of education: Not on file    Highest education level: Not on file   Occupational History    Not on file   Tobacco Use    Smoking status: Former     Types: Pipe, Cigars     Passive exposure: Never    Smokeless tobacco: Never    Tobacco comments:     cigar once a day   Vaping Use    Vaping status: Never Used   Substance and Sexual Activity    Alcohol use: Yes     Alcohol/week: 2.0 standard drinks of alcohol     Types: 2 Shots of liquor per week     Comment: social    Drug use: No    Sexual activity: Yes     Partners: Female     Birth control/protection: None   Other Topics Concern    Not on file   Social History Narrative    Not on file     Social Determinants of Health     Financial Resource Strain: Not on file   Food Insecurity: No Food Insecurity (12/27/2023)    Hunger Vital Sign     Worried About Running Out of Food in the Last Year: Never true     Ran Out of Food in the Last Year: Never true   Transportation Needs: No Transportation Needs (12/27/2023)    PRAPARE - Transportation     Lack of Transportation (Medical): No     Lack of Transportation (Non-Medical): No   Physical Activity: Not on file   Stress: Not on file   Social Connections: Not on file   Intimate Partner Violence: Not on file   Housing Stability: High Risk (12/27/2023)    Housing Stability Vital Sign     Unable to Pay for Housing in the Last Year: Yes     Number of Places Lived in the Last Year: 1     Unstable Housing in the Last Year: No       Family History   Problem Relation Age of Onset    Stroke Mother     Hypertension Father     Cancer Father     COPD Father        Physical Exam:    Vitals:   Vitals:    01/30/24 1611   BP: 102/58   Pulse: 99   SpO2: 99%       Physical Exam  Constitutional:       General: He is not in acute distress.     Appearance: Normal appearance.   HENT:      Head: Normocephalic and atraumatic.       Mouth/Throat:      Mouth: Mucous membranes are moist.   Eyes:      General: No scleral icterus.     Extraocular Movements: Extraocular movements intact.   Cardiovascular:      Rate and Rhythm: Normal rate and regular rhythm.      Pulses: Normal pulses.      Heart sounds: S1 normal and S2 normal. No murmur heard.     No friction rub. No gallop.      Comments: Nonelevated JVP. Trace pitting edema bilaterally  Pulmonary:      Effort: Pulmonary effort is normal.      Breath sounds: No wheezing, rhonchi or rales.   Abdominal:      General: There is no distension.      Palpations: Abdomen is soft.      Tenderness: There is no abdominal tenderness. There is no guarding or rebound.   Musculoskeletal:         General: Normal range of motion.      Cervical back: Neck supple.   Skin:     General: Skin is warm and dry.      Capillary Refill: Capillary refill takes less than 2 seconds.   Neurological:      General: No focal deficit present.      Mental Status: He is alert and oriented to person, place, and time.   Psychiatric:         Mood and Affect: Mood normal.         Labs & Results:    Lab Results   Component Value Date    SODIUM 137 12/29/2023    K 3.8 12/29/2023     12/29/2023    CO2 30 12/29/2023    BUN 25 12/29/2023    CREATININE 1.86 (H) 12/29/2023    GLUC 200 (H) 12/29/2023    CALCIUM 7.7 (L) 12/29/2023     Lab Results   Component Value Date    WBC 5.92 12/26/2023    HGB 9.3 (L) 12/26/2023    HCT 31.0 (L) 12/26/2023    MCV 87 12/26/2023     12/26/2023     Lab Results   Component Value Date    NTBNP 697 (H) 04/10/2023      Lab Results   Component Value Date    CHOLESTEROL 170 10/06/2023    CHOLESTEROL 165 11/22/2022    CHOLESTEROL 235 (H) 09/19/2022     Lab Results   Component Value Date    HDL 55 10/06/2023    HDL 55 11/22/2022    HDL 64 09/19/2022     Lab Results   Component Value Date    TRIG 146 10/06/2023    TRIG 127 11/22/2022    TRIG 198 (H) 09/19/2022     Lab Results   Component Value Date     NONHDLC 110 11/22/2022    NONHDLC 171 09/19/2022    NONHDLC 118 09/05/2022       EKG personally reviewed by Monica Wilcox.     Counseling / Coordination of Care  Time spent today 25 minutes.  Greater than 50% of total time was spent with the patient and / or family counseling and / or coordination of care. We went over current diagnosis, most recent studies and any changes in treatment.    Thank you for the opportunity to participate in the care of this patient.    MONICA WILCOX MD  ADVANCED HEART FAILURE AND MECHANICAL CIRCULATORY SUPPORT  [facility]

## 2024-01-30 NOTE — PATIENT INSTRUCTIONS
Continue current medications  Obtain labs as ordered  Daily CardioMems transmissions  2g sodium diet  2L fluid restriction  Daily weights  Follow up on CPAP machine  Please let us know if you can use the coupon for entresto, otherwise will transition to losartan

## 2024-02-01 ENCOUNTER — CLINICAL SUPPORT (OUTPATIENT)
Dept: CARDIOLOGY CLINIC | Facility: CLINIC | Age: 59
End: 2024-02-01
Payer: COMMERCIAL

## 2024-02-01 DIAGNOSIS — I50.32 CHRONIC DIASTOLIC CONGESTIVE HEART FAILURE (HCC): Primary | ICD-10-CM

## 2024-02-01 PROCEDURE — 93264 REM MNTR WRLS P-ART PRS SNR: CPT | Performed by: INTERNAL MEDICINE

## 2024-02-02 ENCOUNTER — APPOINTMENT (OUTPATIENT)
Dept: LAB | Facility: CLINIC | Age: 59
End: 2024-02-02
Payer: COMMERCIAL

## 2024-02-02 DIAGNOSIS — D63.1 ANEMIA DUE TO STAGE 3 CHRONIC KIDNEY DISEASE, UNSPECIFIED WHETHER STAGE 3A OR 3B CKD: ICD-10-CM

## 2024-02-02 DIAGNOSIS — I50.9 CHF EXACERBATION (HCC): ICD-10-CM

## 2024-02-02 DIAGNOSIS — N18.30 ANEMIA DUE TO STAGE 3 CHRONIC KIDNEY DISEASE, UNSPECIFIED WHETHER STAGE 3A OR 3B CKD: ICD-10-CM

## 2024-02-02 LAB
ANION GAP SERPL CALCULATED.3IONS-SCNC: 14 MMOL/L
BASOPHILS # BLD AUTO: 0.07 THOUSANDS/ÂΜL (ref 0–0.1)
BASOPHILS NFR BLD AUTO: 1 % (ref 0–1)
BUN SERPL-MCNC: 26 MG/DL (ref 5–25)
CALCIUM SERPL-MCNC: 8.3 MG/DL (ref 8.4–10.2)
CHLORIDE SERPL-SCNC: 101 MMOL/L (ref 96–108)
CO2 SERPL-SCNC: 19 MMOL/L (ref 21–32)
CREAT SERPL-MCNC: 1.69 MG/DL (ref 0.6–1.3)
EOSINOPHIL # BLD AUTO: 0.29 THOUSAND/ÂΜL (ref 0–0.61)
EOSINOPHIL NFR BLD AUTO: 5 % (ref 0–6)
ERYTHROCYTE [DISTWIDTH] IN BLOOD BY AUTOMATED COUNT: 16.9 % (ref 11.6–15.1)
GFR SERPL CREATININE-BSD FRML MDRD: 44 ML/MIN/1.73SQ M
GLUCOSE P FAST SERPL-MCNC: 186 MG/DL (ref 65–99)
HCT VFR BLD AUTO: 32.5 % (ref 36.5–49.3)
HGB BLD-MCNC: 9.8 G/DL (ref 12–17)
IMM GRANULOCYTES # BLD AUTO: 0.04 THOUSAND/UL (ref 0–0.2)
IMM GRANULOCYTES NFR BLD AUTO: 1 % (ref 0–2)
LYMPHOCYTES # BLD AUTO: 0.85 THOUSANDS/ÂΜL (ref 0.6–4.47)
LYMPHOCYTES NFR BLD AUTO: 14 % (ref 14–44)
MCH RBC QN AUTO: 25.7 PG (ref 26.8–34.3)
MCHC RBC AUTO-ENTMCNC: 30.2 G/DL (ref 31.4–37.4)
MCV RBC AUTO: 85 FL (ref 82–98)
MONOCYTES # BLD AUTO: 0.63 THOUSAND/ÂΜL (ref 0.17–1.22)
MONOCYTES NFR BLD AUTO: 10 % (ref 4–12)
NEUTROPHILS # BLD AUTO: 4.41 THOUSANDS/ÂΜL (ref 1.85–7.62)
NEUTS SEG NFR BLD AUTO: 69 % (ref 43–75)
NRBC BLD AUTO-RTO: 0 /100 WBCS
PLATELET # BLD AUTO: 329 THOUSANDS/UL (ref 149–390)
PMV BLD AUTO: 10.3 FL (ref 8.9–12.7)
POTASSIUM SERPL-SCNC: 4.4 MMOL/L (ref 3.5–5.3)
RBC # BLD AUTO: 3.82 MILLION/UL (ref 3.88–5.62)
SODIUM SERPL-SCNC: 134 MMOL/L (ref 135–147)
WBC # BLD AUTO: 6.29 THOUSAND/UL (ref 4.31–10.16)

## 2024-02-02 PROCEDURE — 36415 COLL VENOUS BLD VENIPUNCTURE: CPT

## 2024-02-02 PROCEDURE — 80048 BASIC METABOLIC PNL TOTAL CA: CPT

## 2024-02-02 PROCEDURE — 85025 COMPLETE CBC W/AUTO DIFF WBC: CPT

## 2024-02-02 NOTE — ED NOTES
Problem: Discharge Planning  Goal: Discharge to home or other facility with appropriate resources  Outcome: Progressing   Assessed patients knowledge of discharge.  Will continue to work with patient on discharge planning and answer patient questions. Will consult case management and  as necessary.   Problem: Pain  Goal: Verbalizes/displays adequate comfort level or baseline comfort level  Outcome: Progressing   Patient educated on acute pain.  Taught patient to use call light to ask for pain medication.  PRN pain medication given for acute pain.  Will continue to monitor pain per unit protocol.    Problem: Safety - Adult  Goal: Free from fall injury  Outcome: Progressing   Will remain free from falls. Fall precautions are in place. Call light, telephone and bedside table are within reach.   Problem: ABCDS Injury Assessment  Goal: Absence of physical injury  Outcome: Progressing      Dr. Goncalves at [facility] for pt eval.     Annalise Valiente, RN  12/23/23 0637

## 2024-02-04 ENCOUNTER — APPOINTMENT (EMERGENCY)
Dept: RADIOLOGY | Facility: HOSPITAL | Age: 59
DRG: 414 | End: 2024-02-04
Payer: COMMERCIAL

## 2024-02-04 ENCOUNTER — HOSPITAL ENCOUNTER (INPATIENT)
Facility: HOSPITAL | Age: 59
LOS: 6 days | Discharge: HOME HEALTH | DRG: 414 | End: 2024-02-12
Attending: EMERGENCY MEDICINE | Admitting: SURGERY
Payer: COMMERCIAL

## 2024-02-04 ENCOUNTER — APPOINTMENT (OUTPATIENT)
Dept: RADIOLOGY | Facility: HOSPITAL | Age: 59
DRG: 414 | End: 2024-02-04
Payer: COMMERCIAL

## 2024-02-04 DIAGNOSIS — K85.10 ACUTE GALLSTONE PANCREATITIS: Primary | ICD-10-CM

## 2024-02-04 DIAGNOSIS — K80.50 CHOLEDOCHOLITHIASIS: ICD-10-CM

## 2024-02-04 LAB
2HR DELTA HS TROPONIN: -4 NG/L
4HR DELTA HS TROPONIN: -3 NG/L
ALBUMIN SERPL BCP-MCNC: 3.5 G/DL (ref 3.5–5)
ALP SERPL-CCNC: 340 U/L (ref 34–104)
ALT SERPL W P-5'-P-CCNC: 55 U/L (ref 7–52)
ANION GAP SERPL CALCULATED.3IONS-SCNC: 14 MMOL/L
AST SERPL W P-5'-P-CCNC: 85 U/L (ref 13–39)
ATRIAL RATE: 103 BPM
BASOPHILS # BLD AUTO: 0.05 THOUSANDS/ÂΜL (ref 0–0.1)
BASOPHILS NFR BLD AUTO: 1 % (ref 0–1)
BILIRUB SERPL-MCNC: 2.15 MG/DL (ref 0.2–1)
BNP SERPL-MCNC: 165 PG/ML (ref 0–100)
BUN SERPL-MCNC: 30 MG/DL (ref 5–25)
CALCIUM SERPL-MCNC: 8.1 MG/DL (ref 8.4–10.2)
CARDIAC TROPONIN I PNL SERPL HS: 13 NG/L
CARDIAC TROPONIN I PNL SERPL HS: 14 NG/L
CARDIAC TROPONIN I PNL SERPL HS: 17 NG/L
CHLORIDE SERPL-SCNC: 98 MMOL/L (ref 96–108)
CO2 SERPL-SCNC: 21 MMOL/L (ref 21–32)
CREAT SERPL-MCNC: 2.06 MG/DL (ref 0.6–1.3)
EOSINOPHIL # BLD AUTO: 0.31 THOUSAND/ÂΜL (ref 0–0.61)
EOSINOPHIL NFR BLD AUTO: 5 % (ref 0–6)
ERYTHROCYTE [DISTWIDTH] IN BLOOD BY AUTOMATED COUNT: 16.9 % (ref 11.6–15.1)
GFR SERPL CREATININE-BSD FRML MDRD: 34 ML/MIN/1.73SQ M
GLUCOSE SERPL-MCNC: 109 MG/DL (ref 65–140)
HCT VFR BLD AUTO: 30.7 % (ref 36.5–49.3)
HGB BLD-MCNC: 9.7 G/DL (ref 12–17)
IMM GRANULOCYTES # BLD AUTO: 0.04 THOUSAND/UL (ref 0–0.2)
IMM GRANULOCYTES NFR BLD AUTO: 1 % (ref 0–2)
LIPASE SERPL-CCNC: 4994 U/L (ref 11–82)
LYMPHOCYTES # BLD AUTO: 0.76 THOUSANDS/ÂΜL (ref 0.6–4.47)
LYMPHOCYTES NFR BLD AUTO: 12 % (ref 14–44)
MCH RBC QN AUTO: 25.8 PG (ref 26.8–34.3)
MCHC RBC AUTO-ENTMCNC: 31.6 G/DL (ref 31.4–37.4)
MCV RBC AUTO: 82 FL (ref 82–98)
MONOCYTES # BLD AUTO: 0.73 THOUSAND/ÂΜL (ref 0.17–1.22)
MONOCYTES NFR BLD AUTO: 11 % (ref 4–12)
NEUTROPHILS # BLD AUTO: 4.49 THOUSANDS/ÂΜL (ref 1.85–7.62)
NEUTS SEG NFR BLD AUTO: 70 % (ref 43–75)
NRBC BLD AUTO-RTO: 0 /100 WBCS
P AXIS: 56 DEGREES
PLATELET # BLD AUTO: 283 THOUSANDS/UL (ref 149–390)
PMV BLD AUTO: 10.4 FL (ref 8.9–12.7)
POTASSIUM SERPL-SCNC: 4 MMOL/L (ref 3.5–5.3)
PR INTERVAL: 130 MS
PROT SERPL-MCNC: 6.5 G/DL (ref 6.4–8.4)
QRS AXIS: 81 DEGREES
QRSD INTERVAL: 82 MS
QT INTERVAL: 354 MS
QTC INTERVAL: 463 MS
RBC # BLD AUTO: 3.76 MILLION/UL (ref 3.88–5.62)
SODIUM SERPL-SCNC: 133 MMOL/L (ref 135–147)
T WAVE AXIS: 21 DEGREES
VENTRICULAR RATE: 103 BPM
WBC # BLD AUTO: 6.38 THOUSAND/UL (ref 4.31–10.16)

## 2024-02-04 PROCEDURE — 36415 COLL VENOUS BLD VENIPUNCTURE: CPT

## 2024-02-04 PROCEDURE — G1004 CDSM NDSC: HCPCS

## 2024-02-04 PROCEDURE — 84484 ASSAY OF TROPONIN QUANT: CPT

## 2024-02-04 PROCEDURE — 99223 1ST HOSP IP/OBS HIGH 75: CPT | Performed by: SURGERY

## 2024-02-04 PROCEDURE — 93005 ELECTROCARDIOGRAM TRACING: CPT

## 2024-02-04 PROCEDURE — 76705 ECHO EXAM OF ABDOMEN: CPT

## 2024-02-04 PROCEDURE — 85025 COMPLETE CBC W/AUTO DIFF WBC: CPT

## 2024-02-04 PROCEDURE — 71275 CT ANGIOGRAPHY CHEST: CPT

## 2024-02-04 PROCEDURE — 80053 COMPREHEN METABOLIC PANEL: CPT

## 2024-02-04 PROCEDURE — 93010 ELECTROCARDIOGRAM REPORT: CPT | Performed by: INTERNAL MEDICINE

## 2024-02-04 PROCEDURE — 99285 EMERGENCY DEPT VISIT HI MDM: CPT

## 2024-02-04 PROCEDURE — 99285 EMERGENCY DEPT VISIT HI MDM: CPT | Performed by: EMERGENCY MEDICINE

## 2024-02-04 PROCEDURE — 83880 ASSAY OF NATRIURETIC PEPTIDE: CPT

## 2024-02-04 PROCEDURE — 96365 THER/PROPH/DIAG IV INF INIT: CPT

## 2024-02-04 PROCEDURE — 74177 CT ABD & PELVIS W/CONTRAST: CPT

## 2024-02-04 PROCEDURE — 83690 ASSAY OF LIPASE: CPT

## 2024-02-04 PROCEDURE — 71045 X-RAY EXAM CHEST 1 VIEW: CPT

## 2024-02-04 RX ORDER — SODIUM CHLORIDE, SODIUM GLUCONATE, SODIUM ACETATE, POTASSIUM CHLORIDE, MAGNESIUM CHLORIDE, SODIUM PHOSPHATE, DIBASIC, AND POTASSIUM PHOSPHATE .53; .5; .37; .037; .03; .012; .00082 G/100ML; G/100ML; G/100ML; G/100ML; G/100ML; G/100ML; G/100ML
50 INJECTION, SOLUTION INTRAVENOUS CONTINUOUS
Status: DISCONTINUED | OUTPATIENT
Start: 2024-02-04 | End: 2024-02-06

## 2024-02-04 RX ORDER — HEPARIN SODIUM 5000 [USP'U]/ML
7500 INJECTION, SOLUTION INTRAVENOUS; SUBCUTANEOUS EVERY 8 HOURS SCHEDULED
Status: DISCONTINUED | OUTPATIENT
Start: 2024-02-04 | End: 2024-02-09

## 2024-02-04 RX ORDER — SODIUM CHLORIDE 9 MG/ML
3 INJECTION INTRAVENOUS
Status: DISCONTINUED | OUTPATIENT
Start: 2024-02-04 | End: 2024-02-04

## 2024-02-04 RX ORDER — LORATADINE 10 MG/1
10 TABLET ORAL DAILY
Status: DISCONTINUED | OUTPATIENT
Start: 2024-02-05 | End: 2024-02-12 | Stop reason: HOSPADM

## 2024-02-04 RX ORDER — ALBUTEROL SULFATE 90 UG/1
2 AEROSOL, METERED RESPIRATORY (INHALATION) EVERY 4 HOURS PRN
Status: DISCONTINUED | OUTPATIENT
Start: 2024-02-04 | End: 2024-02-12 | Stop reason: HOSPADM

## 2024-02-04 RX ORDER — OXYCODONE HYDROCHLORIDE 10 MG/1
10 TABLET ORAL EVERY 4 HOURS PRN
Status: DISCONTINUED | OUTPATIENT
Start: 2024-02-04 | End: 2024-02-11

## 2024-02-04 RX ORDER — PREGABALIN 50 MG/1
50 CAPSULE ORAL
Status: DISCONTINUED | OUTPATIENT
Start: 2024-02-04 | End: 2024-02-12 | Stop reason: HOSPADM

## 2024-02-04 RX ORDER — ONDANSETRON 2 MG/ML
4 INJECTION INTRAMUSCULAR; INTRAVENOUS EVERY 6 HOURS PRN
Status: DISCONTINUED | OUTPATIENT
Start: 2024-02-04 | End: 2024-02-12 | Stop reason: HOSPADM

## 2024-02-04 RX ORDER — INSULIN LISPRO 100 [IU]/ML
2-12 INJECTION, SOLUTION INTRAVENOUS; SUBCUTANEOUS EVERY 6 HOURS SCHEDULED
Status: DISCONTINUED | OUTPATIENT
Start: 2024-02-05 | End: 2024-02-05

## 2024-02-04 RX ORDER — OXYCODONE HYDROCHLORIDE 5 MG/1
5 TABLET ORAL EVERY 4 HOURS PRN
Status: DISCONTINUED | OUTPATIENT
Start: 2024-02-04 | End: 2024-02-11

## 2024-02-04 RX ORDER — HEPARIN SODIUM 5000 [USP'U]/ML
7500 INJECTION, SOLUTION INTRAVENOUS; SUBCUTANEOUS EVERY 8 HOURS SCHEDULED
Status: DISCONTINUED | OUTPATIENT
Start: 2024-02-04 | End: 2024-02-04

## 2024-02-04 RX ORDER — BUDESONIDE AND FORMOTEROL FUMARATE DIHYDRATE 160; 4.5 UG/1; UG/1
2 AEROSOL RESPIRATORY (INHALATION) 2 TIMES DAILY
Status: DISCONTINUED | OUTPATIENT
Start: 2024-02-05 | End: 2024-02-12 | Stop reason: HOSPADM

## 2024-02-04 RX ORDER — BUMETANIDE 0.25 MG/ML
1 INJECTION INTRAMUSCULAR; INTRAVENOUS ONCE
Status: DISCONTINUED | OUTPATIENT
Start: 2024-02-04 | End: 2024-02-04

## 2024-02-04 RX ORDER — HYDROMORPHONE HCL/PF 1 MG/ML
0.5 SYRINGE (ML) INJECTION
Status: DISCONTINUED | OUTPATIENT
Start: 2024-02-04 | End: 2024-02-11

## 2024-02-04 RX ORDER — PANTOPRAZOLE SODIUM 40 MG/1
40 TABLET, DELAYED RELEASE ORAL DAILY
Status: DISCONTINUED | OUTPATIENT
Start: 2024-02-05 | End: 2024-02-12 | Stop reason: HOSPADM

## 2024-02-04 RX ORDER — ACETAMINOPHEN 325 MG/1
650 TABLET ORAL EVERY 6 HOURS PRN
Status: DISCONTINUED | OUTPATIENT
Start: 2024-02-04 | End: 2024-02-12 | Stop reason: HOSPADM

## 2024-02-04 RX ORDER — SODIUM CHLORIDE, SODIUM GLUCONATE, SODIUM ACETATE, POTASSIUM CHLORIDE, MAGNESIUM CHLORIDE, SODIUM PHOSPHATE, DIBASIC, AND POTASSIUM PHOSPHATE .53; .5; .37; .037; .03; .012; .00082 G/100ML; G/100ML; G/100ML; G/100ML; G/100ML; G/100ML; G/100ML
1000 INJECTION, SOLUTION INTRAVENOUS ONCE
Status: COMPLETED | OUTPATIENT
Start: 2024-02-04 | End: 2024-02-04

## 2024-02-04 RX ORDER — ATORVASTATIN CALCIUM 10 MG/1
10 TABLET, FILM COATED ORAL DAILY
Status: DISCONTINUED | OUTPATIENT
Start: 2024-02-05 | End: 2024-02-12 | Stop reason: HOSPADM

## 2024-02-04 RX ORDER — METOPROLOL SUCCINATE 50 MG/1
50 TABLET, EXTENDED RELEASE ORAL DAILY
Status: DISCONTINUED | OUTPATIENT
Start: 2024-02-05 | End: 2024-02-12 | Stop reason: HOSPADM

## 2024-02-04 RX ADMIN — SODIUM CHLORIDE, SODIUM GLUCONATE, SODIUM ACETATE, POTASSIUM CHLORIDE, MAGNESIUM CHLORIDE, SODIUM PHOSPHATE, DIBASIC, AND POTASSIUM PHOSPHATE 1000 ML: .53; .5; .37; .037; .03; .012; .00082 INJECTION, SOLUTION INTRAVENOUS at 15:25

## 2024-02-04 RX ADMIN — IOHEXOL 100 ML: 350 INJECTION, SOLUTION INTRAVENOUS at 15:36

## 2024-02-04 RX ADMIN — SODIUM CHLORIDE, SODIUM GLUCONATE, SODIUM ACETATE, POTASSIUM CHLORIDE, MAGNESIUM CHLORIDE, SODIUM PHOSPHATE, DIBASIC, AND POTASSIUM PHOSPHATE 190 ML/HR: .53; .5; .37; .037; .03; .012; .00082 INJECTION, SOLUTION INTRAVENOUS at 17:31

## 2024-02-04 RX ADMIN — OXYCODONE HYDROCHLORIDE 10 MG: 10 TABLET ORAL at 18:19

## 2024-02-04 RX ADMIN — HEPARIN SODIUM 7500 UNITS: 5000 INJECTION INTRAVENOUS; SUBCUTANEOUS at 17:28

## 2024-02-04 NOTE — H&P
H&P Exam - General Surgery   Mesfin Cano 58 y.o. male MRN: [id number]  Unit/Bed#: ED 05 Encounter: [id number]    Assessment/Plan     Assessment:  57 yo M with PMH of asthma, afib (on Eliquis), CHF (EF 50%), DM, HTN, HLD, RYGB, and ventral hernia repair who presents with pancreatitis (likely 2/2 gallstones), hyperbilirubinemia, and ANTONIO.    Initially tachycardic 100's and hypotensive 70s/40's, now vitals normal after 1L crystalloid, on room air    WBC 6.38  Hb 9.7  Cr 2.06  T bili 2.15    2/4 CT PE study with abdomen and pelvis: Mild fluid stranding surrounding the pancreatic tail raising suspicion for pancreatitis.     Plan:  Admit to surgery service  Will obtain RUQ US to look for choledocholithiasis  NPO, sips with meds  Isolyte@190  Trend LFT's, WBC and fever curve  PRN analgesia  DVT PPX, hold home eliquis  OOB/ambulate  Will require laparoscopic cholecystectomy after resolution of symptoms    History of Present Illness     HPI:  Mesfin Cano is a 58 y.o. male who presents with abdominal pain. Patient reports that the pain started 2 days ago and is located across his upper abdomen. Denies inciting event or previous similar pain. Denies nausea, vomiting, or change in bowel habit. He also reports some subjective fevers. Upon presentation, he was found to be tachycardic and hypotensive. He was given 1L of crystalloid and now vitals are normal. Lab work showed hyperbilirubinemia, ANTONIO, and elevated lipase concerning for pancreatitis. General surgery consulted for suspected acute pancreatitis.    Review of Systems   Constitutional: Negative.    HENT: Negative.     Eyes: Negative.    Respiratory: Negative.     Cardiovascular: Negative.    Gastrointestinal:  Positive for abdominal pain (Epigastric). Negative for nausea and vomiting.   Endocrine: Negative.    Genitourinary: Negative.    Musculoskeletal: Negative.    Skin: Negative.    Neurological: Negative.    Psychiatric/Behavioral: Negative.           Historical  Information   Past Medical History:   Diagnosis Date    Acute gastritis without hemorrhage     last assessed 3/24/17    Asthma     Atrial fibrillation (HCC)     Bilateral leg edema 02/19/2020    CHF (congestive heart failure) (HCC)     Coronary artery disease     Daytime sleepiness 07/17/2019    Diabetes mellitus (HCC)     Dyspnea on exertion 10/11/2021    Edema of both feet 01/23/2020    Gastric bypass status for obesity     Hyperlipidemia     Hypertension     Hypokalemia 11/14/2021    Impaired fasting glucose     last assessed 5/10/17    Knee sprain, bilateral 06/14/2018    Leg cramps 06/16/2022    Obesity     Viral gastroenteritis     last assessed 11/4/16     Past Surgical History:   Procedure Laterality Date    CARDIAC CATHETERIZATION N/A 3/9/2022    Procedure: CARDIAC RHC W/ PRESSURE SENSOR;  Surgeon: Aminata Wilcox MD;  Location: [facility];  Service: Cardiology    CARDIAC CATHETERIZATION N/A 9/6/2022    Procedure: Cardiac catheterization;  Surgeon: Yolanda Del Rosario DO;  Location: [facility];  Service: Cardiology    CARDIAC CATHETERIZATION N/A 9/6/2022    Procedure: Cardiac Coronary Angiogram;  Surgeon: Yolanda Del Rosario DO;  Location: [facility];  Service: Cardiology    CARDIAC CATHETERIZATION N/A 10/11/2023    Procedure: Cardiac RHC;  Surgeon: Yoel Darden MD;  Location: [facility];  Service: Cardiology    CARPAL TUNNEL RELEASE      bilateral    GASTRIC BYPASS  2004    with han en y    HERNIA REPAIR      ventral inscisional    IR BIOPSY BONE MARROW  12/14/2023    TONSILLECTOMY       Social History   Social History     Substance and Sexual Activity   Alcohol Use Yes    Alcohol/week: 2.0 standard drinks of alcohol    Types: 2 Shots of liquor per week    Comment: social     Social History     Substance and Sexual Activity   Drug Use No     Social History     Tobacco Use   Smoking Status Former    Types: Pipe, Cigars    Passive exposure: Never   Smokeless Tobacco Never    Tobacco Comments    cigar once a day     E-Cigarette/Vaping    E-Cigarette Use Never User      E-Cigarette/Vaping Substances    Nicotine No     THC No     CBD No     Flavoring No     Other No     Unknown No      Family History:   Family History   Problem Relation Age of Onset    Stroke Mother     Hypertension Father     Cancer Father     COPD Father        Meds/Allergies   PTA meds:   Prior to Admission Medications   Prescriptions Last Dose Informant Patient Reported? Taking?   Accu-Chek FastClix Lancets MISC  Self No No   Sig: Test blood sugar twice daily   Blood Glucose Monitoring Suppl (Accu-Chek Guide) w/Device KIT  Self No No   Sig: Use 2 (two) times a day Test blood sugar twice daily   EPINEPHrine (EPIPEN) 0.3 mg/0.3 mL SOAJ  Self No No   Sig: Inject 0.3 mL (0.3 mg total) into a muscle once for 1 dose   Empagliflozin (JARDIANCE) 10 MG TABS tablet  Self No No   Sig: Take 1 tablet (10 mg total) by mouth daily   Klor-Con M20 20 MEQ tablet   No No   Sig: TAKE 2 TABLETS BY MOUTH 2 TIMES A DAY.   albuterol (PROVENTIL HFA,VENTOLIN HFA) 90 mcg/act inhaler   No No   Sig: Inhale 2 puffs every 4 (four) hours as needed for wheezing or shortness of breath   apixaban (Eliquis) 5 mg  Self No No   Sig: Take 1 tablet (5 mg total) by mouth 2 (two) times a day   atorvastatin (LIPITOR) 10 mg tablet   No No   Sig: TAKE 1 TABLET BY MOUTH EVERY DAY   budesonide-formoterol (Symbicort) 160-4.5 mcg/act inhaler   No No   Sig: Inhale 2 puffs 2 (two) times a day Rinse mouth after use.   bumetanide (BUMEX) 2 mg tablet   No No   Sig: Take 3 tablets (6 mg total) by mouth 2 (two) times a day   fluticasone (FLONASE) 50 mcg/act nasal spray  Self No No   Si spray into each nostril 2 (two) times a day   loratadine (CLARITIN) 10 mg tablet  Self No No   Sig: Take 1 tablet (10 mg total) by mouth daily Do not start before 2023.   metolazone (ZAROXOLYN) 2.5 mg tablet   No No   Sig: Take 2 tablets (5 mg total) by mouth if needed  (weight gain of 3+ lbs in 24 hours, 5+ lbs in one week or signs of worsening fluid retention) Take 20-30 minutes before Bumex dose and with additional potassium   metoprolol succinate (TOPROL-XL) 50 mg 24 hr tablet   No No   Sig: Take 1 tablet (50 mg total) by mouth daily   montelukast (SINGULAIR) 10 mg tablet  Self No No   Sig: Take 1 tablet (10 mg total) by mouth daily at bedtime   pantoprazole (PROTONIX) 40 mg tablet  Self No No   Sig: Take 1 tablet (40 mg total) by mouth daily   potassium chloride (K-DUR,KLOR-CON) 20 mEq tablet   No No   Sig: Take 2 tablets (40 mEq total) by mouth 2 (two) times a day   pregabalin (LYRICA) 50 mg capsule   No No   Sig: Take 1 caps. at bedtime   sacubitril-valsartan (Entresto) 24-26 MG TABS   No No   Sig: Take 1 tablet by mouth 2 (two) times a day   sitaGLIPtin (Januvia) 100 mg tablet  Self No No   Sig: TAKE 1/2 TABLET DAILY   Patient taking differently: 50 mg daily TAKE 1/2 TABLET DAILY   spironolactone (ALDACTONE) 25 mg tablet   No No   Sig: Take 1 tablet (25 mg total) by mouth daily   tiotropium (Spiriva Respimat) 1.25 MCG/ACT AERS inhaler   No No   Sig: Inhale 2 puffs daily      Facility-Administered Medications: None     Allergies   Allergen Reactions    Azithromycin Other (See Comments)     Shaky, uneasy feeling     Bactrim [Sulfamethoxazole-Trimethoprim] Other (See Comments)     shakiness       Objective   First Vitals:   Blood Pressure: 95/72 (02/04/24 1424)  Pulse: (!) 110 (02/04/24 1424)  Temperature: 98.2 °F (36.8 °C) (02/04/24 1424)  Respirations: 22 (02/04/24 1424)  SpO2: 95 % (02/04/24 1424)    Current Vitals:   Blood Pressure: 109/55 (02/04/24 1615)  Pulse: 88 (02/04/24 1615)  Temperature: 98.2 °F (36.8 °C) (02/04/24 1424)  Respirations: 19 (02/04/24 1615)  SpO2: 98 % (02/04/24 1615)    No intake or output data in the 24 hours ending 02/04/24 1650    Invasive Devices       Peripheral Intravenous Line  Duration             Peripheral IV 02/04/24 Left;Ventral  (anterior) Forearm <1 day                    Physical Exam  Constitutional:       Appearance: Normal appearance. He is obese.   HENT:      Head: Normocephalic and atraumatic.      Right Ear: External ear normal.      Left Ear: External ear normal.      Nose: Nose normal.      Mouth/Throat:      Mouth: Mucous membranes are moist.      Pharynx: Oropharynx is clear.   Eyes:      Extraocular Movements: Extraocular movements intact.      Pupils: Pupils are equal, round, and reactive to light.   Cardiovascular:      Rate and Rhythm: Normal rate.      Pulses: Normal pulses.   Pulmonary:      Effort: Pulmonary effort is normal.   Abdominal:      General: Abdomen is flat.      Palpations: Abdomen is soft.      Tenderness: There is abdominal tenderness (Tender in epigastric area and RUQ).   Musculoskeletal:         General: Normal range of motion.      Cervical back: Normal range of motion.   Skin:     General: Skin is warm and dry.   Neurological:      General: No focal deficit present.      Mental Status: He is alert and oriented to person, place, and time.   Psychiatric:         Mood and Affect: Mood normal.         Behavior: Behavior normal.           Lab Results: I have personally reviewed pertinent lab results.    Imaging: I have personally reviewed pertinent reports.    EKG, Pathology, and Other Studies: I have personally reviewed pertinent reports.      Code Status: Level 1 - Full Code  Advance Directive and Living Will:      Power of :    POLST:

## 2024-02-04 NOTE — ED PROCEDURE NOTE
Procedure  POC Cardiac US    Date/Time: 2/4/2024 3:31 PM    Performed by: Earl Sarkar MD  Authorized by: Earl Sarkar MD    Patient location:  ED  Procedure details:     Exam Type:  Diagnostic    Indications: hypotension, suspected volume depletion and dyspnea      Assessment / Evaluation for: cardiac function, pericardial effusion, inferior vena cava for fluid responsiveness, intravascular volume status and right heart strain (suspected pulmonary embolism)      Exam Type: initial exam      Image quality: diagnostic      Image availability:  Images available in PACS  Patient Details:     Cardiac Rhythm:  Regular    Mechanical ventilation: No    Cardiac findings:     Echo technique: limited 2D      Views obtained: parasternal long axis, parasternal short axis, subcostal and apical      Pericardial effusion: absent      Tamponade physiology: absent      Wall motion: normal      LV systolic function: normal      RV dilation: mild    Pulmonary findings:     Left Lung Findings: left lung sliding      Right lung findings: right lung sliding      B-lines: no B-lines present    IVC findings:     IVC Size: small      IVC Inspiratory Collapse: normal      IVC Variability (%):  50  Interpretation:     Fluid Status:  Euvolemic  POC Biliary US    Date/Time: 2/4/2024 3:32 PM    Performed by: Earl Sarkar MD  Authorized by: Earl Sarkar MD    Patient location:  ED  Procedure details:     Exam Type:  Diagnostic    Indications: upper right quadrant abdominal pain and epigastric pain      Assessment for:  Cholecystitis and cholelithiasis    Views obtained: gallbladder (transverse and longitudinal), liver, common bile duct and portal triad      Image quality: diagnostic      Image availability:  Images available in PACS  Findings:     Cholelithiasis: identified      Common bile duct:  Normal    Gallbladder wall:  Normal    Pericholecystic fluid: not identified      Sonographic Longoria's sign: positive      Polyps: not  identified      Mass: not identified    Interpretation:     Biliary ultrasound impressions: cholelithiasis                     Earl Sarkar MD  02/04/24 [id number]

## 2024-02-04 NOTE — ED ATTENDING ATTESTATION
Final Diagnoses:     1. Acute gallstone pancreatitis    2. Choledocholithiasis      ED Course as of 02/12/24 1908   Sun Feb 04, 2024   1522 POCUS Cardiac/IVC + POCUS Lung:  - transthoracic echocardiogram was performed at bedside by myself and resident.   - Images collected were adequate quality.   - This was a technically difficult study due to lung interference.   - Apical, parasternal, subcostal views were obtained/attempted.   - The main purpose of this study was to r/o obvious pathology requiring emergent intervention as in summary.   - Many views/images obtained for educational reasons.   - Findings:    There was no obvious pericardial effusion:   LV function grossly normal appearing.    RV function grossly normal appearing.     IVC was IVC < 2.1cm; >50% compression w/ sniff likely RAP 3 mmHg     IVC Max: 1cm     IVC Min: 4.7mm     CI: 51%   Lungs:    There was no obvious pleural effusion.     B-lines were not present anteriorly bilaterally at upper BLUE-point (3+ B-lines in 2+ fields w/ concern for HF/Cardiogenic pulmonary edema sensitivity 85-94%, specificity 92% LR+ 7.4-12; LR- 0.06-0.16)    Bilateral anterior lung sliding bilaterally present at upper BLUE-point (no pneumothorax, sen 91%; spec 98%)   Valves:    There was no obvious MR regurgitation.    There was no obvious TR regurgitation.    There was no obvious AR regurgitation    There was LA dilation.    There was RA dilation.     Summary:   - There were no obvious B-lines or pleural effusion to suggest CHF  - There was no obvious anterior pneumothorax  - GB was enlarged with borderline wall thickening, CBD 5mm -> cholecystitis? Obvious gall stones.   - There was no large pericardial effusion.   - Would benefit from IVF. Will do 500mL.         I, Alek Lewis MD, saw and evaluated the patient. All available labs and X-rays were ordered by me or the resident / non-physician and have been reviewed by myself. I discussed the patient with the resident  / non-physician and agree with the resident's / non-physician practitioner's findings and plan as documented in the resident's / non-physician practicitioner's note, except where noted.   At this point, I agree with the current assessment done in the ED.   I was present during key portions of all procedures performed unless otherwise stated.     HPI:  NURSING TRIAGE:    This is a 58 y.o. male presenting for evaluation of abdominal pain with some degree of SOB.  Hx of CHF.  No falls/trauma. Chief Complaint   Patient presents with    Abdominal Problem    Shortness of Breath    Constipation     For the last 3 days has had abd pain and SOB no BM for three days also, normally has a BM daily. Hx CHF      PHYSICAL: ASSESSMENT + PLAN:   Pertinent: Diffuer belly tenderness    General: VS reviewed  Appears in NAD  awake, alert.   Well-nourished, well-developed. Appears stated age.   Speaking normally in full sentences.   Head: Normocephalic, atraumatic  Eyes: EOM-I. No diplopia.   No hyphema.   No subconjunctival hemorrhages.  Symmetrical lids.   ENT: Atraumatic external nose and ears.    MMM  No malocclusion. No stridor. Normal phonation. No drooling. Normal swallowing.   Neck: No JVD.  CV: No pallor noted  Lungs:   No tachypnea  No respiratory distress  Abd: soft nt nd no rebound/guarding  MSK:   FROM spontaneously  Skin: Dry, intact.   Neuro: Awake, alert, GCS15, CN II-XII grossly intact.   Motor grossly intact.  Psychiatric/Behavioral: interacting normally; appropriate mood/affect.    Exam: deferred    Vitals:    02/11/24 1623 02/11/24 2200 02/11/24 2305 02/12/24 0823   BP: 111/69  112/68 132/70   BP Location:    Right arm   Pulse: 72 85 78 104   Resp:    20   Temp: 97.9 °F (36.6 °C)  98.1 °F (36.7 °C) 97.6 °F (36.4 °C)   TempSrc:    Oral   SpO2: 99% 96% 95% 95%   Weight:       Height:        - given the presentation, will check CBC for marked leukocytosis  - CMP for liver enzyme elevation that could signal  cholecystitis, biliary obstructive disease. Check RFTs for ANTONIO / markers of dehydration.  - Lipase given abdominal pain to evaluate specifically for pancreatitis.  - Given age, will do EKG/Troponin as perhaps an atypical presentation of ACS.  HEART score:  History 1=Moderate suspicious   ECG 1=Nonspecific repolarization disturbance   Age 1= > 45 - <65 years   Risk Factors 2= > 3 risk factors, or history of atherosclerotic disease   Troponin 1= Between 13-35 ng/L   Total 6   - Lastly, will consider abdominal imaging.  - CT AP w Contrast: r/o appendicitis, cholecystitis, bowel obstruction or other acute abdominal pathology. Would also demonstrate signs of pyelonephritis, cystitis.   - Disposition per workup.      There are no obvious limitations to social determinants of care.   Nursing note reviewed.   Vitals reviewed.   Orders placed by myself and/or advanced practitioner / resident.    Previous chart was reviewed  No language barrier.   History obtained from patient.    There are no limitations to the history obtained:     Past Medical: Past Surgical:    has a past medical history of Acute gastritis without hemorrhage, Asthma, Atrial fibrillation ([facility]), Bilateral leg edema (02/19/2020), CHF (congestive heart failure) ([facility]), Coronary artery disease, Daytime sleepiness (07/17/2019), Diabetes mellitus ([facility]), Dyspnea on exertion (10/11/2021), Edema of both feet (01/23/2020), Gastric bypass status for obesity, Hyperlipidemia, Hypertension, Hypokalemia (11/14/2021), Impaired fasting glucose, Knee sprain, bilateral (06/14/2018), Leg cramps (06/16/2022), Obesity, and Viral gastroenteritis.  has a past surgical history that includes Gastric bypass (2004); Hernia repair; Carpal tunnel release; Tonsillectomy; Cardiac catheterization (N/A, 3/9/2022); Cardiac catheterization (N/A, 9/6/2022); Cardiac catheterization (N/A, 9/6/2022); Cardiac catheterization (N/A, 10/11/2023); IR biopsy bone marrow (12/14/2023); CHOLECYSTECTOMY  LAPAROSCOPIC (N/A, 2024); LAPAROSCOPIC TRANSGASTRIC ACCESS FOR ERCP (N/A, 2024); and LAPAROSCOPIC TRANSGASTRIC ACCESS FOR ERCP (N/A, 2024).   Social: Cardiac (Echo/Cath)   Social History     Substance and Sexual Activity   Alcohol Use Yes    Alcohol/week: 2.0 standard drinks of alcohol    Types: 2 Shots of liquor per week    Comment: social     Social History     Tobacco Use   Smoking Status Former    Types: Pipe, Cigars    Passive exposure: Never   Smokeless Tobacco Never   Tobacco Comments    cigar once a day     Social History     Substance and Sexual Activity   Drug Use No    Results for orders placed during the hospital encounter of 21    Echo complete with contrast if indicated    Narrative  [place] [id number] [phone]    Transthoracic Echocardiogram  2D, M-mode, Doppler, and Color Doppler    Study date:  2021    Patient: KARY GRANADOS  MR number: [id number]  Account number: [id number]  : 1965  Age: 56 years  Gender: Male  Status: Inpatient  Location: Bedside  Height: 73 in  Weight: 359.3 lb  BP: 177/ 91 mmHg    Indications: Assess congestive heart failure.    Diagnoses: I42.9 - Cardiomyopathy, unspecified    Sonographer:  TOSHIA Foster  Primary Physician:  Soo Chavez MD  Group:  [facility]  Cardiology Fellow:  Babak Badillo  Interpreting Physician:  Rohith Syed MD    SUMMARY    LEFT VENTRICLE:  Size was at the upper limits of normal.  Systolic function was at the lower limits of normal. Ejection fraction was estimated to be 50 %.  There were no regional wall motion abnormalities.  There was akinesis of the basal inferior and basal-mid inferolateral wall(s).  There was no evidence of concentric hypertrophy.  Doppler parameters were consistent with abnormal left ventricular relaxation (grade 1 diastolic dysfunction).    LEFT ATRIUM:  The atrium was mildly  dilated.    MITRAL VALVE:  There was mild annular calcification.  There was trace regurgitation.    TRICUSPID VALVE:  There was trace regurgitation.    IVC, HEPATIC VEINS:  The inferior vena cava was mildly dilated.    PROCEDURE: The procedure was performed at the bedside. This was a routine study. The transthoracic approach was used. The study included complete 2D imaging, M-mode, complete spectral Doppler, and color Doppler. The heart rate was 87 bpm,  at the start of the study. Images were obtained from the parasternal, apical, subcostal, and suprasternal notch acoustic windows. Echocardiographic views were limited due to restricted patient mobility, decreased penetration, and lung  interference. This was a technically difficult study.    LEFT VENTRICLE: Size was at the upper limits of normal. Systolic function was at the lower limits of normal. Ejection fraction was estimated to be 50 %. There were no regional wall motion abnormalities. There was akinesis of the basal  inferior and basal-mid inferolateral wall(s). Wall thickness was normal. There was no evidence of concentric hypertrophy. DOPPLER: Doppler parameters were consistent with abnormal left ventricular relaxation (grade 1 diastolic dysfunction).    RIGHT VENTRICLE: The size was normal. Systolic function was normal. Wall thickness was normal.    LEFT ATRIUM: The atrium was mildly dilated.    RIGHT ATRIUM: Size was normal.    MITRAL VALVE: There was mild annular calcification. Valve structure was normal. There was normal leaflet separation. DOPPLER: The transmitral velocity was within the normal range. There was no evidence for stenosis. There was trace  regurgitation.    AORTIC VALVE: The valve was trileaflet. Leaflets exhibited mildly increased thickness, mild calcification, and normal cuspal separation. DOPPLER: Transaortic velocity was within the normal range. There was no evidence for stenosis. There  was no significant regurgitation.    TRICUSPID  VALVE: The valve structure was normal. There was normal leaflet separation. DOPPLER: The transtricuspid velocity was within the normal range. There was no evidence for stenosis. There was trace regurgitation.    PULMONIC VALVE: Leaflets exhibited normal thickness, no calcification, and normal cuspal separation. DOPPLER: The transpulmonic velocity was within the normal range. There was no significant regurgitation.    PERICARDIUM: There was no pericardial effusion. The pericardium was normal in appearance.    AORTA: The root exhibited normal size. The ascending aorta was normal in size.    SYSTEMIC VEINS: IVC: The inferior vena cava was mildly dilated. Respirophasic changes were normal.    SYSTEM MEASUREMENT TABLES    2D  %FS: 21.53 %  Ao Diam: 3.19 cm  Ao asc: 3.32 cm  EDV(Teich): 188.84 ml  EF(Teich): 42.83 %  ESV(Teich): 107.95 ml  IVSd: 0.8 cm  LA Area: 19.94 cm2  LA Diam: 4.66 cm  LVEDV MOD A4C: 126.08 ml  LVEF MOD A4C: 46.23 %  LVESV MOD A4C: 67.8 ml  LVIDd: 6.13 cm  LVIDs: 4.81 cm  LVLd A4C: 8.23 cm  LVLs A4C: 7.32 cm  LVPWd: 0.78 cm  RA Area: 20.73 cm2  RVIDd: 3.74 cm  SV MOD A4C: 58.28 ml  SV(Teich): 80.89 ml    MM  TAPSE: 2.77 cm    PW  E' Sept: 0.08 m/s  E/E' Sept: 9.01  MV A Zach: 0.84 m/s  MV Dec Butler: 3.3 m/s2  MV DecT: 228.2 ms  MV E Zach: 0.75 m/s  MV E/A Ratio: 0.89  MV PHT: 66.18 ms  MVA By PHT: 3.32 cm2    Intersocietal Commission Accredited Echocardiography Laboratory    Prepared and electronically signed by    Rohith Syed MD  Signed 20-Jul-2021 07:13:15    No results found for this or any previous visit.    No results found for this or any previous visit.     Labs: Imaging:   Labs Reviewed   CBC AND DIFFERENTIAL - Abnormal       Result Value Ref Range Status    WBC 6.38  4.31 - 10.16 Thousand/uL Final    RBC 3.76 (*) 3.88 - 5.62 Million/uL Final    Hemoglobin 9.7 (*) 12.0 - 17.0 g/dL Final    Hematocrit 30.7 (*) 36.5 - 49.3 % Final    MCV 82  82 - 98 fL Final    MCH 25.8 (*) 26.8 - 34.3 pg  Final    MCHC 31.6  31.4 - 37.4 g/dL Final    RDW 16.9 (*) 11.6 - 15.1 % Final    MPV 10.4  8.9 - 12.7 fL Final    Platelets 283  149 - 390 Thousands/uL Final    nRBC 0  /100 WBCs Final    Neutrophils Relative 70  43 - 75 % Final    Immat GRANS % 1  0 - 2 % Final    Lymphocytes Relative 12 (*) 14 - 44 % Final    Monocytes Relative 11  4 - 12 % Final    Eosinophils Relative 5  0 - 6 % Final    Basophils Relative 1  0 - 1 % Final    Neutrophils Absolute 4.49  1.85 - 7.62 Thousands/µL Final    Immature Grans Absolute 0.04  0.00 - 0.20 Thousand/uL Final    Lymphocytes Absolute 0.76  0.60 - 4.47 Thousands/µL Final    Monocytes Absolute 0.73  0.17 - 1.22 Thousand/µL Final    Eosinophils Absolute 0.31  0.00 - 0.61 Thousand/µL Final    Basophils Absolute 0.05  0.00 - 0.10 Thousands/µL Final   COMPREHENSIVE METABOLIC PANEL - Abnormal    Sodium 133 (*) 135 - 147 mmol/L Final    Potassium 4.0  3.5 - 5.3 mmol/L Final    Chloride 98  96 - 108 mmol/L Final    CO2 21  21 - 32 mmol/L Final    ANION GAP 14  mmol/L Final    BUN 30 (*) 5 - 25 mg/dL Final    Creatinine 2.06 (*) 0.60 - 1.30 mg/dL Final    Comment: Standardized to IDMS reference method    Glucose 109  65 - 140 mg/dL Final    Comment: If the patient is fasting, the ADA then defines impaired fasting glucose as > 100 mg/dL and diabetes as > or equal to 123 mg/dL.    Calcium 8.1 (*) 8.4 - 10.2 mg/dL Final    AST 85 (*) 13 - 39 U/L Final    ALT 55 (*) 7 - 52 U/L Final    Comment: Specimen collection should occur prior to Sulfasalazine administration due to the potential for falsely depressed results.     Alkaline Phosphatase 340 (*) 34 - 104 U/L Final    Total Protein 6.5  6.4 - 8.4 g/dL Final    Albumin 3.5  3.5 - 5.0 g/dL Final    Total Bilirubin 2.15 (*) 0.20 - 1.00 mg/dL Final    Comment: Use of this assay is not recommended for patients undergoing treatment with eltrombopag due to the potential for falsely elevated results.  N-acetyl-p-benzoquinone imine (metabolite of  "Acetaminophen) will generate erroneously low results in samples for patients that have taken an overdose of Acetaminophen.    eGFR 34  ml/min/1.73sq m Final    Narrative:     National Kidney Disease Foundation guidelines for Chronic Kidney Disease (CKD):     Stage 1 with normal or high GFR (GFR > 90 mL/min/1.73 square meters)    Stage 2 Mild CKD (GFR = 60-89 mL/min/1.73 square meters)    Stage 3A Moderate CKD (GFR = 45-59 mL/min/1.73 square meters)    Stage 3B Moderate CKD (GFR = 30-44 mL/min/1.73 square meters)    Stage 4 Severe CKD (GFR = 15-29 mL/min/1.73 square meters)    Stage 5 End Stage CKD (GFR <15 mL/min/1.73 square meters)  Note: GFR calculation is accurate only with a steady state creatinine   LIPASE - Abnormal    Lipase 4,994 (*) 11 - 82 u/L Final   B-TYPE NATRIURETIC PEPTIDE (BNP) - Abnormal     (*) 0 - 100 pg/mL Final   HS TROPONIN I 0HR - Normal    hs TnI 0hr 17  \"Refer to ACS Flowchart\"- see link ng/L Final    Comment:                                              Initial (time 0) result  If >=50 ng/L, Myocardial injury suggested ;  Type of myocardial injury and treatment strategy  to be determined.  If 5-49 ng/L, a delta result at 2 hours and or 4 hours will be needed to further evaluate.  If <4 ng/L, and chest pain has been >3 hours since onset, patient may qualify for discharge based on the HEART score in the ED.  If <5 ng/L and <3hours since onset of chest pain, a delta result at 2 hours will be needed to further evaluate.    HS Troponin 99th Percentile URL of a Health Population=12 ng/L with a 95% Confidence Interval of 8-18 ng/L.    Second Troponin (time 2 hours)  If calculated delta >= 20 ng/L,  Myocardial injury suggested ; Type of myocardial injury and treatment strategy to be determined.  If 5-49 ng/L and the calculated delta is 5-19 ng/L, consult medical service for evaluation.  Continue evaluation for ischemia on ecg and other possible etiology and repeat hs troponin at 4 hours.  If " "delta is <5 ng/L at 2 hours, consider discharge based on risk stratification via the HEART score (if in ED), or ISATU risk score in IP/Observation.    HS Troponin 99th Percentile URL of a Health Population=12 ng/L with a 95% Confidence Interval of 8-18 ng/L.   HS TROPONIN I 2HR - Normal    hs TnI 2hr 13  \"Refer to ACS Flowchart\"- see link ng/L Final    Comment:                                              Initial (time 0) result  If >=50 ng/L, Myocardial injury suggested ;  Type of myocardial injury and treatment strategy  to be determined.  If 5-49 ng/L, a delta result at 2 hours and or 4 hours will be needed to further evaluate.  If <4 ng/L, and chest pain has been >3 hours since onset, patient may qualify for discharge based on the HEART score in the ED.  If <5 ng/L and <3hours since onset of chest pain, a delta result at 2 hours will be needed to further evaluate.    HS Troponin 99th Percentile URL of a Health Population=12 ng/L with a 95% Confidence Interval of 8-18 ng/L.    Second Troponin (time 2 hours)  If calculated delta >= 20 ng/L,  Myocardial injury suggested ; Type of myocardial injury and treatment strategy to be determined.  If 5-49 ng/L and the calculated delta is 5-19 ng/L, consult medical service for evaluation.  Continue evaluation for ischemia on ecg and other possible etiology and repeat hs troponin at 4 hours.  If delta is <5 ng/L at 2 hours, consider discharge based on risk stratification via the HEART score (if in ED), or ISATU risk score in IP/Observation.    HS Troponin 99th Percentile URL of a Health Population=12 ng/L with a 95% Confidence Interval of 8-18 ng/L.    Delta 2hr hsTnI -4  <20 ng/L Final    FL ERCP biliary only   Final Result      Choledocholithiasis status post removal of calculi following sphincterotomy and balloon sweeping.         Workstation performed: [id number]         XR cholangiogram intraoperative   Final Result      Findings consistent with distal common bile duct " obstruction as noted above. Please see procedure report for further details.            Workstation performed: [id number]         MRI abdomen wo contrast and mrcp   Final Result      Gallstones.      5 mm low signal focus in the common duct is suspicious for a tiny common duct stone with perhaps a second small area of gravel. There is no common bile duct dilatation however.      No pancreatic duct dilatation. Scattered small cystic areas in the pancreas may represent small pseudocysts or side duct IPMNs. The pancreatic duct anatomy is not optimally seen and could be related to a prominent secondary duct in the pancreatic head.      Follow-up with contrast is suggested in 3 months time when the patient is no longer acute.      The study was marked in EPIC for immediate notification.      Workstation performed: [id number]         US right upper quadrant   Final Result      Cholelithiasis. No evidence of acute cholecystitis.      No intra or extrahepatic biliary ductal dilatation.      Moderate hepatic steatosis.      Workstation performed: [id number]         CT pe study w abdomen pelvis w contrast   Final Result   No acute pulmonary emboli identified. No pulmonary infiltrates. Note made of CardioMEMS device, stable position.      Mild fluid stranding surrounding the pancreatic tail raising suspicion for pancreatitis.      Prior gastric bypass surgery noted.      The study was marked in EPIC for immediate notification.         Workstation performed: [id number]         X-ray chest 1 view portable   Final Result      No acute cardiopulmonary disease.         Resident: CAMI ALBERTS I, the attending radiologist, have reviewed the images and agree with the final report above.      Workstation performed: [id number]            Medications: Code Status:   Medications   multi-electrolyte (ISOLYTE-S PH 7.4) bolus 1,000 mL (0 mL Intravenous Stopped 2/4/24 9372)   iohexol (OMNIPAQUE) 350 MG/ML injection (MULTI-DOSE) 100  mL (100 mL Intravenous Given 2/4/24 1536)   potassium chloride (Klor-Con M20) CR tablet 40 mEq (40 mEq Oral Given 2/6/24 0912)   insulin regular (HumuLIN R,NovoLIN R) injection 6 Units (6 Units Intravenous Given 2/8/24 0136)   potassium chloride (Klor-Con M20) CR tablet 40 mEq (40 mEq Oral Given 2/10/24 0808)   potassium chloride (Klor-Con M20) CR tablet 20 mEq (20 mEq Oral Given 2/11/24 0829)    Code Status: Prior  Advance Directive and Living Will:      Power of :    POLST:       Orders Placed This Encounter   Procedures    POC Cardiac US    POC Biliary US    PARACHUTE DME    X-ray chest 1 view portable    CT pe study w abdomen pelvis w contrast    US right upper quadrant    MRI abdomen wo contrast and mrcp    XR cholangiogram intraoperative    FL ERCP biliary only    CBC and differential    HS Troponin 0hr (reflex protocol)    Comprehensive metabolic panel    Lipase    B-Type Natriuretic Peptide(BNP)    HS Troponin I 2hr    HS Troponin I 4hr    Comprehensive metabolic panel    CBC and differential    Magnesium    Phosphorus    CBC and differential    Comprehensive metabolic panel    CBC and differential    Comprehensive metabolic panel    TIBC Panel (incl. Iron, TIBC, % Iron Saturation)    Ferritin    Comprehensive metabolic panel    CBC and differential    CBC and differential    Comprehensive metabolic panel    Blood gas, arterial    RBC Morphology Reflex Test    Magnesium    CBC and differential    Comprehensive metabolic panel    Comprehensive metabolic panel    CBC and differential    Comprehensive metabolic panel    INTERNAL: Ambulatory Referral to Home Health    Discharge Diet    Continuous cardiac monitoring    Continuous pulse oximetry    Insert peripheral IV    Check fingerstick glucose on all diabetic patients. Call if greater than 180.    Discontinue arterial line    No strenuous exercise    Call provider for:  persistent nausea or vomiting    Call provider for:  severe uncontrolled pain     Call provider for:  redness, tenderness, or signs of infection (pain, swelling, redness, odor or green/yellow discharge around incision site)    Call provider for: active or persistent bleeding    Call provider for:  difficulty breathing, headache or visual disturbances    No dressing needed    Inpatient consult to gastroenterology    OT eval and treat    EKG RESULTS    ECG 12 lead    ECG 12 lead    ECG 12 lead    Type and screen    ABORh Recheck - Contact Blood Bank Prior to Collection    Prepare Leukoreduced RBC: 2 Units    Place in Observation    Inpatient Admission    Discharge patient    ERCP Fluoro    Update level of care    Update level of care     Time reflects when diagnosis was documented in both MDM as applicable and the Disposition within this note       Time User Action Codes Description Comment    2/6/2024  8:56 AM Harvey Corbett Add [K85.10] Acute gallstone pancreatitis     2/6/2024  9:20 AM Harvey Corbett [K80.50] Choledocholithiasis     2/7/2024 11:37 PM Supa Peters Modify [K85.10] Acute gallstone pancreatitis           ED Disposition       None          Follow-up Information       Follow up With Specialties Details Why Contact Info Additional Information    [facility] Schedule an appointment as soon as possible for a visit in 1 week(s)  [address] [id number] [phone] [address], [id number] [phone]    [facility]  Follow up  [address] [facility] [address]  [phone]             Discharge Medication List as of 2/12/2024 11:10 AM        START taking these medications    Details   acetaminophen (TYLENOL) 325 mg tablet Take 2 tablets (650 mg total) by mouth every 6 (six) hours as needed for mild pain, headaches or fever, Starting Mon 2/12/2024, OTC      amoxicillin-clavulanate (AUGMENTIN)  875-125 mg per tablet Take 1 tablet by mouth every 12 (twelve) hours for 4 doses, Starting Mon 2/12/2024, Until Wed 2/14/2024, Normal           CONTINUE these medications which have NOT CHANGED    Details   Accu-Chek FastClix Lancets MISC Test blood sugar twice daily, Normal      albuterol (PROVENTIL HFA,VENTOLIN HFA) 90 mcg/act inhaler Inhale 2 puffs every 4 (four) hours as needed for wheezing or shortness of breath, Starting Mon 1/29/2024, Normal      apixaban (Eliquis) 5 mg Take 1 tablet (5 mg total) by mouth 2 (two) times a day, Starting Tue 10/24/2023, Normal      atorvastatin (LIPITOR) 10 mg tablet TAKE 1 TABLET BY MOUTH EVERY DAY, Normal      Blood Glucose Monitoring Suppl (Accu-Chek Guide) w/Device KIT Use 2 (two) times a day Test blood sugar twice daily, Starting Thu 8/5/2021, Normal      budesonide-formoterol (Symbicort) 160-4.5 mcg/act inhaler Inhale 2 puffs 2 (two) times a day Rinse mouth after use., Starting Thu 1/11/2024, Normal      bumetanide (BUMEX) 2 mg tablet Take 3 tablets (6 mg total) by mouth 2 (two) times a day, Starting Wed 11/22/2023, Until Mon 5/20/2024, Normal      Empagliflozin (JARDIANCE) 10 MG TABS tablet Take 1 tablet (10 mg total) by mouth daily, Starting Fri 5/26/2023, Normal      EPINEPHrine (EPIPEN) 0.3 mg/0.3 mL SOAJ Inject 0.3 mL (0.3 mg total) into a muscle once for 1 dose, Starting Tue 2/21/2023, Normal      fluticasone (FLONASE) 50 mcg/act nasal spray 1 spray into each nostril 2 (two) times a day, Starting Thu 10/12/2023, OTC      Klor-Con M20 20 MEQ tablet TAKE 2 TABLETS BY MOUTH 2 TIMES A DAY., Starting Mon 1/8/2024, Normal      loratadine (CLARITIN) 10 mg tablet Take 1 tablet (10 mg total) by mouth daily Do not start before October 13, 2023., Starting Fri 10/13/2023, OTC      metolazone (ZAROXOLYN) 2.5 mg tablet Take 2 tablets (5 mg total) by mouth if needed (weight gain of 3+ lbs in 24 hours, 5+ lbs in one week or signs of worsening fluid retention) Take 20-30 minutes  before Bumex dose and with additional potassium, Starting Thu 1/11/2024, No Print      metoprolol succinate (TOPROL-XL) 50 mg 24 hr tablet Take 1 tablet (50 mg total) by mouth daily, Starting Mon 1/8/2024, Normal      montelukast (SINGULAIR) 10 mg tablet Take 1 tablet (10 mg total) by mouth daily at bedtime, Starting Thu 2/3/2022, Until Sat 12/23/2023, Normal      pantoprazole (PROTONIX) 40 mg tablet Take 1 tablet (40 mg total) by mouth daily, Starting Tue 3/21/2023, Normal      pregabalin (LYRICA) 50 mg capsule Take 1 caps. at bedtime, Normal      sacubitril-valsartan (Entresto) 24-26 MG TABS Take 1 tablet by mouth 2 (two) times a day, Starting Fri 12/29/2023, Normal      sitaGLIPtin (Januvia) 100 mg tablet TAKE 1/2 TABLET DAILY, Normal      spironolactone (ALDACTONE) 25 mg tablet Take 1 tablet (25 mg total) by mouth daily, Starting Mon 1/8/2024, Normal      tiotropium (Spiriva Respimat) 1.25 MCG/ACT AERS inhaler Inhale 2 puffs daily, Starting Mon 1/8/2024, Until Wed 2/7/2024, Normal           Outpatient Discharge Orders   INTERNAL: Ambulatory Referral to Home Health   Standing Status: Future Standing Exp. Date: 02/12/25      Discharge Diet     No strenuous exercise     Call provider for:  persistent nausea or vomiting     Call provider for:  severe uncontrolled pain     Call provider for:  redness, tenderness, or signs of infection (pain, swelling, redness, odor or green/yellow discharge around incision site)     Call provider for: active or persistent bleeding     Call provider for:  difficulty breathing, headache or visual disturbances     No dressing needed     Prior to Admission Medications   Prescriptions Last Dose Informant Patient Reported? Taking?   Accu-Chek FastClix Lancets MISC  Self No No   Sig: Test blood sugar twice daily   Blood Glucose Monitoring Suppl (Accu-Chek Guide) w/Device KIT  Self No No   Sig: Use 2 (two) times a day Test blood sugar twice daily   EPINEPHrine (EPIPEN) 0.3 mg/0.3 mL SOAJ   Self No No   Sig: Inject 0.3 mL (0.3 mg total) into a muscle once for 1 dose   Empagliflozin (JARDIANCE) 10 MG TABS tablet  Self No No   Sig: Take 1 tablet (10 mg total) by mouth daily   Klor-Con M20 20 MEQ tablet   No No   Sig: TAKE 2 TABLETS BY MOUTH 2 TIMES A DAY.   albuterol (PROVENTIL HFA,VENTOLIN HFA) 90 mcg/act inhaler   No No   Sig: Inhale 2 puffs every 4 (four) hours as needed for wheezing or shortness of breath   apixaban (Eliquis) 5 mg  Self No No   Sig: Take 1 tablet (5 mg total) by mouth 2 (two) times a day   atorvastatin (LIPITOR) 10 mg tablet   No No   Sig: TAKE 1 TABLET BY MOUTH EVERY DAY   budesonide-formoterol (Symbicort) 160-4.5 mcg/act inhaler   No No   Sig: Inhale 2 puffs 2 (two) times a day Rinse mouth after use.   bumetanide (BUMEX) 2 mg tablet   No No   Sig: Take 3 tablets (6 mg total) by mouth 2 (two) times a day   fluticasone (FLONASE) 50 mcg/act nasal spray  Self No No   Si spray into each nostril 2 (two) times a day   loratadine (CLARITIN) 10 mg tablet  Self No No   Sig: Take 1 tablet (10 mg total) by mouth daily Do not start before 2023.   metolazone (ZAROXOLYN) 2.5 mg tablet   No No   Sig: Take 2 tablets (5 mg total) by mouth if needed (weight gain of 3+ lbs in 24 hours, 5+ lbs in one week or signs of worsening fluid retention) Take 20-30 minutes before Bumex dose and with additional potassium   metoprolol succinate (TOPROL-XL) 50 mg 24 hr tablet   No No   Sig: Take 1 tablet (50 mg total) by mouth daily   montelukast (SINGULAIR) 10 mg tablet  Self No No   Sig: Take 1 tablet (10 mg total) by mouth daily at bedtime   pantoprazole (PROTONIX) 40 mg tablet  Self No No   Sig: Take 1 tablet (40 mg total) by mouth daily   potassium chloride (K-DUR,KLOR-CON) 20 mEq tablet   No No   Sig: Take 2 tablets (40 mEq total) by mouth 2 (two) times a day   pregabalin (LYRICA) 50 mg capsule   No No   Sig: Take 1 caps. at bedtime   sacubitril-valsartan (Entresto) 24-26 MG TABS   No No   Sig:  "Take 1 tablet by mouth 2 (two) times a day   sitaGLIPtin (Januvia) 100 mg tablet  Self No No   Sig: TAKE 1/2 TABLET DAILY   Patient taking differently: 50 mg daily TAKE 1/2 TABLET DAILY   spironolactone (ALDACTONE) 25 mg tablet   No No   Sig: Take 1 tablet (25 mg total) by mouth daily   tiotropium (Spiriva Respimat) 1.25 MCG/ACT AERS inhaler   No No   Sig: Inhale 2 puffs daily      Facility-Administered Medications: None                        Portions of the record may have been created with voice recognition software. Occasional wrong word or \"sound a like\" substitutions may have occurred due to the inherent limitations of voice recognition software. Read the chart carefully and recognize, using context, where substitutions have occurred.    Electronically signed by:  Alek Lewis  "

## 2024-02-05 ENCOUNTER — APPOINTMENT (OUTPATIENT)
Dept: RADIOLOGY | Facility: HOSPITAL | Age: 59
DRG: 414 | End: 2024-02-05
Payer: COMMERCIAL

## 2024-02-05 ENCOUNTER — TELEPHONE (OUTPATIENT)
Dept: CARDIOLOGY CLINIC | Facility: CLINIC | Age: 59
End: 2024-02-05

## 2024-02-05 PROBLEM — K85.10 ACUTE GALLSTONE PANCREATITIS: Status: ACTIVE | Noted: 2024-02-05

## 2024-02-05 LAB
ALBUMIN SERPL BCP-MCNC: 3 G/DL (ref 3.5–5)
ALP SERPL-CCNC: 363 U/L (ref 34–104)
ALT SERPL W P-5'-P-CCNC: 66 U/L (ref 7–52)
ANION GAP SERPL CALCULATED.3IONS-SCNC: 11 MMOL/L
AST SERPL W P-5'-P-CCNC: 91 U/L (ref 13–39)
BASOPHILS # BLD AUTO: 0.04 THOUSANDS/ÂΜL (ref 0–0.1)
BASOPHILS NFR BLD AUTO: 1 % (ref 0–1)
BILIRUB SERPL-MCNC: 1.69 MG/DL (ref 0.2–1)
BUN SERPL-MCNC: 29 MG/DL (ref 5–25)
CALCIUM ALBUM COR SERPL-MCNC: 8.3 MG/DL (ref 8.3–10.1)
CALCIUM SERPL-MCNC: 7.5 MG/DL (ref 8.4–10.2)
CHLORIDE SERPL-SCNC: 96 MMOL/L (ref 96–108)
CO2 SERPL-SCNC: 27 MMOL/L (ref 21–32)
CREAT SERPL-MCNC: 1.97 MG/DL (ref 0.6–1.3)
EOSINOPHIL # BLD AUTO: 0.35 THOUSAND/ÂΜL (ref 0–0.61)
EOSINOPHIL NFR BLD AUTO: 7 % (ref 0–6)
ERYTHROCYTE [DISTWIDTH] IN BLOOD BY AUTOMATED COUNT: 17.2 % (ref 11.6–15.1)
GFR SERPL CREATININE-BSD FRML MDRD: 36 ML/MIN/1.73SQ M
GLUCOSE P FAST SERPL-MCNC: 122 MG/DL (ref 65–99)
GLUCOSE SERPL-MCNC: 122 MG/DL (ref 65–140)
GLUCOSE SERPL-MCNC: 126 MG/DL (ref 65–140)
GLUCOSE SERPL-MCNC: 137 MG/DL (ref 65–140)
GLUCOSE SERPL-MCNC: 144 MG/DL (ref 65–140)
GLUCOSE SERPL-MCNC: 183 MG/DL (ref 65–140)
GLUCOSE SERPL-MCNC: 219 MG/DL (ref 65–140)
HCT VFR BLD AUTO: 28.3 % (ref 36.5–49.3)
HGB BLD-MCNC: 8.8 G/DL (ref 12–17)
IMM GRANULOCYTES # BLD AUTO: 0.04 THOUSAND/UL (ref 0–0.2)
IMM GRANULOCYTES NFR BLD AUTO: 1 % (ref 0–2)
LYMPHOCYTES # BLD AUTO: 0.61 THOUSANDS/ÂΜL (ref 0.6–4.47)
LYMPHOCYTES NFR BLD AUTO: 11 % (ref 14–44)
MAGNESIUM SERPL-MCNC: 2.1 MG/DL (ref 1.9–2.7)
MCH RBC QN AUTO: 25.8 PG (ref 26.8–34.3)
MCHC RBC AUTO-ENTMCNC: 31.1 G/DL (ref 31.4–37.4)
MCV RBC AUTO: 83 FL (ref 82–98)
MONOCYTES # BLD AUTO: 0.65 THOUSAND/ÂΜL (ref 0.17–1.22)
MONOCYTES NFR BLD AUTO: 12 % (ref 4–12)
NEUTROPHILS # BLD AUTO: 3.67 THOUSANDS/ÂΜL (ref 1.85–7.62)
NEUTS SEG NFR BLD AUTO: 68 % (ref 43–75)
NRBC BLD AUTO-RTO: 0 /100 WBCS
PHOSPHATE SERPL-MCNC: 3.8 MG/DL (ref 2.7–4.5)
PLATELET # BLD AUTO: 210 THOUSANDS/UL (ref 149–390)
PMV BLD AUTO: 10.6 FL (ref 8.9–12.7)
POTASSIUM SERPL-SCNC: 4.4 MMOL/L (ref 3.5–5.3)
PROT SERPL-MCNC: 5.6 G/DL (ref 6.4–8.4)
RBC # BLD AUTO: 3.41 MILLION/UL (ref 3.88–5.62)
SODIUM SERPL-SCNC: 134 MMOL/L (ref 135–147)
WBC # BLD AUTO: 5.36 THOUSAND/UL (ref 4.31–10.16)

## 2024-02-05 PROCEDURE — 85025 COMPLETE CBC W/AUTO DIFF WBC: CPT | Performed by: SURGERY

## 2024-02-05 PROCEDURE — 80053 COMPREHEN METABOLIC PANEL: CPT | Performed by: SURGERY

## 2024-02-05 PROCEDURE — 82948 REAGENT STRIP/BLOOD GLUCOSE: CPT

## 2024-02-05 PROCEDURE — 83735 ASSAY OF MAGNESIUM: CPT | Performed by: SURGERY

## 2024-02-05 PROCEDURE — 74181 MRI ABDOMEN W/O CONTRAST: CPT

## 2024-02-05 PROCEDURE — 99232 SBSQ HOSP IP/OBS MODERATE 35: CPT | Performed by: SURGERY

## 2024-02-05 PROCEDURE — 84100 ASSAY OF PHOSPHORUS: CPT | Performed by: SURGERY

## 2024-02-05 RX ORDER — INSULIN LISPRO 100 [IU]/ML
2-12 INJECTION, SOLUTION INTRAVENOUS; SUBCUTANEOUS
Status: DISCONTINUED | OUTPATIENT
Start: 2024-02-06 | End: 2024-02-12 | Stop reason: HOSPADM

## 2024-02-05 RX ORDER — INSULIN LISPRO 100 [IU]/ML
2-12 INJECTION, SOLUTION INTRAVENOUS; SUBCUTANEOUS
Status: DISCONTINUED | OUTPATIENT
Start: 2024-02-05 | End: 2024-02-12 | Stop reason: HOSPADM

## 2024-02-05 RX ADMIN — HEPARIN SODIUM 7500 UNITS: 5000 INJECTION INTRAVENOUS; SUBCUTANEOUS at 14:01

## 2024-02-05 RX ADMIN — ATORVASTATIN CALCIUM 10 MG: 10 TABLET, FILM COATED ORAL at 09:11

## 2024-02-05 RX ADMIN — SODIUM CHLORIDE, SODIUM GLUCONATE, SODIUM ACETATE, POTASSIUM CHLORIDE, MAGNESIUM CHLORIDE, SODIUM PHOSPHATE, DIBASIC, AND POTASSIUM PHOSPHATE 190 ML/HR: .53; .5; .37; .037; .03; .012; .00082 INJECTION, SOLUTION INTRAVENOUS at 06:04

## 2024-02-05 RX ADMIN — ALBUTEROL SULFATE 2 PUFF: 90 AEROSOL, METERED RESPIRATORY (INHALATION) at 06:45

## 2024-02-05 RX ADMIN — LORATADINE 10 MG: 10 TABLET ORAL at 09:11

## 2024-02-05 RX ADMIN — PREGABALIN 50 MG: 50 CAPSULE ORAL at 00:27

## 2024-02-05 RX ADMIN — HEPARIN SODIUM 7500 UNITS: 5000 INJECTION INTRAVENOUS; SUBCUTANEOUS at 22:33

## 2024-02-05 RX ADMIN — PREGABALIN 50 MG: 50 CAPSULE ORAL at 22:33

## 2024-02-05 RX ADMIN — HEPARIN SODIUM 7500 UNITS: 5000 INJECTION INTRAVENOUS; SUBCUTANEOUS at 00:28

## 2024-02-05 RX ADMIN — HEPARIN SODIUM 7500 UNITS: 5000 INJECTION INTRAVENOUS; SUBCUTANEOUS at 09:08

## 2024-02-05 RX ADMIN — INSULIN LISPRO 2 UNITS: 100 INJECTION, SOLUTION INTRAVENOUS; SUBCUTANEOUS at 11:49

## 2024-02-05 RX ADMIN — SODIUM CHLORIDE, SODIUM GLUCONATE, SODIUM ACETATE, POTASSIUM CHLORIDE, MAGNESIUM CHLORIDE, SODIUM PHOSPHATE, DIBASIC, AND POTASSIUM PHOSPHATE 190 ML/HR: .53; .5; .37; .037; .03; .012; .00082 INJECTION, SOLUTION INTRAVENOUS at 01:15

## 2024-02-05 RX ADMIN — BUDESONIDE AND FORMOTEROL FUMARATE DIHYDRATE 2 PUFF: 160; 4.5 AEROSOL RESPIRATORY (INHALATION) at 17:25

## 2024-02-05 RX ADMIN — METOPROLOL SUCCINATE 50 MG: 50 TABLET, EXTENDED RELEASE ORAL at 09:11

## 2024-02-05 RX ADMIN — SODIUM CHLORIDE, SODIUM GLUCONATE, SODIUM ACETATE, POTASSIUM CHLORIDE, MAGNESIUM CHLORIDE, SODIUM PHOSPHATE, DIBASIC, AND POTASSIUM PHOSPHATE 100 ML/HR: .53; .5; .37; .037; .03; .012; .00082 INJECTION, SOLUTION INTRAVENOUS at 16:19

## 2024-02-05 RX ADMIN — PANTOPRAZOLE SODIUM 40 MG: 40 TABLET, DELAYED RELEASE ORAL at 09:11

## 2024-02-05 RX ADMIN — UMECLIDINIUM 1 PUFF: 62.5 AEROSOL, POWDER ORAL at 17:25

## 2024-02-05 NOTE — UTILIZATION REVIEW
Initial Clinical Review    OBSERVATION WRITTEN 2/4/24 @ 1650 CONVERTED TO INPATIENT ADMISSION 2/6/24 @ 0859  DUE TO FURTHER DIAGNOSTIC WORKUP REQUIRED FOR PANCREATITIS, REQUIRING AT LEAST A 2 MIDNIGHT STAY.    Admission: Date/Time/Statement:   Admission Orders (From admission, onward)       Ordered        02/06/24 0859  Inpatient Admission  Once            02/04/24 1650  Place in Observation  Once                          Orders Placed This Encounter   Procedures    Inpatient Admission     Standing Status:   Standing     Number of Occurrences:   1     Order Specific Question:   Level of Care     Answer:   Med Surg [16]     Order Specific Question:   Estimated length of stay     Answer:   More than 2 Midnights     Order Specific Question:   Certification     Answer:   I certify that inpatient services are medically necessary for this patient for a duration of greater than two midnights. See H&P and MD Progress Notes for additional information about the patient's course of treatment.     ED Arrival Information       Expected   -    Arrival   2/4/2024 14:20    Acuity   Urgent              Means of arrival   Walk-In    Escorted by   Self    Service   Surgery-General    Admission type   Emergency              Arrival complaint   abd pain             Chief Complaint   Patient presents with    Abdominal Problem    Shortness of Breath    Constipation     For the last 3 days has had abd pain and SOB no BM for three days also, normally has a BM daily. Hx CHF       Initial Presentation: 58 y.o. male  who presented to [facility] ED initially admitted Observation status then converted to Inpatient due to Pancreatitis.  Presented due to upper abdominal pain for 2 days ago, also subjective fevers. In the ED, tachycardic and hypotensive. He was given 1L IVF. Lab work showed hyperbilirubinemia, ANTONIO, and elevated lipase concerning for pancreatitis.  PMH: asthma, afib (on Eliquis), CHF (EF 50%), DM, HTN, HLD, RYGB, and  ventral hernia repair  Plan: med surg, order RUQ US, keep NPO, continue IVF, trend labs including LFTs, WBCs and temp, pain control prn, OOB and ambulation.     2/6/2024 Inpatient Admission:   Tolerating diet, passing flatus and having BM. Advance diet to clear liquids, continue IVF, fu on MRCP results, continue to trend LFTs, lap cholecystectomy during this admission tentative for 2/7. Hold eliquis, continue statin, BB, protonix and lyrica. Continue pain and nausea control prn.     2/6 Per GI: Suspect patient likely has passed choledocholithiasis with improvement of abdominal pain.  In this setting we will hold off on ERCP unless IntraOp cholangiogram is positive.     Date:   2/7  Day 2:  Tolerated diet yesterday. Abdomen soft, mildly tender in RUQ, ND. Continue NPO, IVF, pain and nausea control prn, encourage OOB and ambulation. GI following and plan for OR today for lap cholecystectomy with IOC.     ED Triage Vitals   Temperature Pulse Respirations Blood Pressure SpO2   02/04/24 1424 02/04/24 1424 02/04/24 1424 02/04/24 1424 02/04/24 1424   98.2 °F (36.8 °C) (!) 110 22 95/72 95 %      Temp Source Heart Rate Source Patient Position - Orthostatic VS BP Location FiO2 (%)   02/04/24 2255 02/04/24 1458 02/04/24 1458 02/04/24 1458 --   Oral Monitor Lying Right arm       Pain Score       02/04/24 1424       9          Wt Readings from Last 1 Encounters:   02/05/24 (!) 152 kg (336 lb)     Additional Vital Signs:   Date/Time Temp Pulse Resp BP MAP (mmHg) SpO2 O2 Device   02/07/24 07:05:27 -- 71 18 118/61 80 99 % --   02/07/24 0300 -- -- -- -- -- 92 % None (Room air)   02/06/24 22:48:28 97.4 °F (36.3 °C) Abnormal  73 -- 121/66 84 98 % --   02/06/24 22:47:31 -- -- -- 121/66 84 -- --   02/06/24 14:36:04 97.7 °F (36.5 °C) 74 17 116/68 84 96 % --   02/06/24 07:35:53 98.1 °F (36.7 °C) 72 -- 101/61 74 96 % --   02/05/24 22:58:13 -- 73 -- 103/60 74 95 % --   02/05/24 14:38:14 98.1 °F (36.7 °C) 66 -- 104/57 73 96 % --   02/05/24  1300 -- 75 -- -- -- 97 % --   02/05/24 1100 -- 72 -- -- -- 97 % --   02/05/24 07:50:04 98.4 °F (36.9 °C) 80 -- 106/57 73 95 % --   02/05/24 05:35:26 -- 77 -- 102/57 72 98 % --   02/05/24 05:14:59 98.9 °F (37.2 °C) 73 -- -- -- 96 % --     Pertinent Labs/Diagnostic Test Results:   2/4 EKG: ST    MRI abdomen wo contrast and mrcp   Final Result by Anival Medeiros MD (02/06 0907)      Gallstones.      5 mm low signal focus in the common duct is suspicious for a tiny common duct stone with perhaps a second small area of gravel. There is no common bile duct dilatation however.      No pancreatic duct dilatation. Scattered small cystic areas in the pancreas may represent small pseudocysts or side duct IPMNs. The pancreatic duct anatomy is not optimally seen and could be related to a prominent secondary duct in the pancreatic head.      Follow-up with contrast is suggested in 3 months time when the patient is no longer acute.      The study was marked in EPIC for immediate notification.      Workstation performed: [id number]         US right upper quadrant   Final Result by Yvonne Mendez MD (02/04 1831)      Cholelithiasis. No evidence of acute cholecystitis.      No intra or extrahepatic biliary ductal dilatation.      Moderate hepatic steatosis.      Workstation performed: [id number]         CT pe study w abdomen pelvis w contrast   Final Result by Annie Diego MD (02/04 1723)   No acute pulmonary emboli identified. No pulmonary infiltrates. Note made of CardioMEMS device, stable position.      Mild fluid stranding surrounding the pancreatic tail raising suspicion for pancreatitis.      Prior gastric bypass surgery noted.      The study was marked in EPIC for immediate notification.         Workstation performed: [id number]         X-ray chest 1 view portable   Final Result by Adams Godinez DO (02/05 2758)      No acute cardiopulmonary disease.         Resident: cally SHARP  attending radiologist, have reviewed the images and agree with the final report above.      Workstation performed: [id number]               Results from last 7 days   Lab Units 02/07/24 0532 02/06/24 0517 02/05/24 0430 02/04/24 1452 02/02/24  0956   WBC Thousand/uL 4.16* 4.48 5.36 6.38 6.29   HEMOGLOBIN g/dL 8.8* 9.1* 8.8* 9.7* 9.8*   HEMATOCRIT % 29.0* 29.5* 28.3* 30.7* 32.5*   PLATELETS Thousands/uL 216 218 210 283 329   NEUTROS ABS Thousands/µL 2.26 2.55 3.67 4.49 4.41         Results from last 7 days   Lab Units 02/07/24 0532 02/06/24 0517 02/05/24 0430 02/04/24 1452 02/02/24  0956   SODIUM mmol/L 137 136 134* 133* 134*   POTASSIUM mmol/L 3.7 3.5 4.4 4.0 4.4   CHLORIDE mmol/L 100 97 96 98 101   CO2 mmol/L 28 28 27 21 19*   ANION GAP mmol/L 9 11 11 14 14   BUN mg/dL 20 25 29* 30* 26*   CREATININE mg/dL 1.65* 1.82* 1.97* 2.06* 1.69*   EGFR ml/min/1.73sq m 45 40 36 34 44   CALCIUM mg/dL 8.2* 7.8* 7.5* 8.1* 8.3*   MAGNESIUM mg/dL  --   --  2.1  --   --    PHOSPHORUS mg/dL  --   --  3.8  --   --      Results from last 7 days   Lab Units 02/07/24 0532 02/06/24 0517 02/05/24 0430 02/04/24  1452   AST U/L 33 54* 91* 85*   ALT U/L 47 60* 66* 55*   ALK PHOS U/L 344* 385* 363* 340*   TOTAL PROTEIN g/dL 5.4* 5.7* 5.6* 6.5   ALBUMIN g/dL 2.9* 3.1* 3.0* 3.5   TOTAL BILIRUBIN mg/dL 0.99 1.32* 1.69* 2.15*     Results from last 7 days   Lab Units 02/07/24  0706 02/06/24  2107 02/06/24  1716 02/06/24  1053 02/06/24  0646 02/05/24  2120 02/05/24  1613 02/05/24  1120 02/05/24  0428 02/05/24  0004   POC GLUCOSE mg/dl 122 181* 125 240* 166* 219* 144* 183* 126 137     Results from last 7 days   Lab Units 02/07/24  0532 02/06/24  0517 02/05/24  0430 02/04/24  1452   GLUCOSE RANDOM mg/dL 121 147* 122 109       Results from last 7 days   Lab Units 02/04/24  1857 02/04/24  1702 02/04/24  1452   HS TNI 0HR ng/L  --   --  17   HS TNI 2HR ng/L  --  13  --    HSTNI D2 ng/L  --  -4  --    HS TNI 4HR ng/L 14  --   --    HSTNI D4  ng/L -3  --   --        Results from last 7 days   Lab Units 02/04/24  1452   BNP pg/mL 165*       Results from last 7 days   Lab Units 02/04/24  1452   LIPASE u/L 4,994*     ED Treatment:   Medication Administration from 02/04/2024 1419 to 02/04/2024 1914         Date/Time Order Dose Route Action     02/04/2024 1525 EST multi-electrolyte (ISOLYTE-S PH 7.4) bolus 1,000 mL 1,000 mL Intravenous New Bag     02/04/2024 1536 EST iohexol (OMNIPAQUE) 350 MG/ML injection (MULTI-DOSE) 100 mL 100 mL Intravenous Given     02/04/2024 1731 EST multi-electrolyte (PLASMALYTE-A/ISOLYTE-S PH 7.4) IV solution 190 mL/hr Intravenous New Bag     02/04/2024 1728 EST heparin (porcine) subcutaneous injection 7,500 Units 7,500 Units Subcutaneous Given     02/04/2024 1819 EST oxyCODONE (ROXICODONE) immediate release tablet 10 mg 10 mg Oral Given          Past Medical History:   Diagnosis Date    Acute gastritis without hemorrhage     last assessed 3/24/17    Asthma     Atrial fibrillation (HCC)     Bilateral leg edema 02/19/2020    CHF (congestive heart failure) (HCC)     Coronary artery disease     Daytime sleepiness 07/17/2019    Diabetes mellitus (HCC)     Dyspnea on exertion 10/11/2021    Edema of both feet 01/23/2020    Gastric bypass status for obesity     Hyperlipidemia     Hypertension     Hypokalemia 11/14/2021    Impaired fasting glucose     last assessed 5/10/17    Knee sprain, bilateral 06/14/2018    Leg cramps 06/16/2022    Obesity     Viral gastroenteritis     last assessed 11/4/16     Present on Admission:   Acute gallstone pancreatitis      Admitting Diagnosis: Abdominal pain [R10.9]  Age/Sex: 58 y.o. male  Admission Orders:  Scheduled Medications:  atorvastatin, 10 mg, Oral, Daily  budesonide-formoterol, 2 puff, Inhalation, BID  ferrous sulfate, 325 mg, Oral, Daily With Breakfast  heparin (porcine), 7,500 Units, Subcutaneous, Q8H ANA  insulin lispro, 2-12 Units, Subcutaneous, TID AC  insulin lispro, 2-12 Units, Subcutaneous,  HS  loratadine, 10 mg, Oral, Daily  metoprolol succinate, 50 mg, Oral, Daily  pantoprazole, 40 mg, Oral, Daily  pregabalin, 50 mg, Oral, HS  umeclidinium, 1 puff, Inhalation, Daily      Continuous IV Infusions:  multi-electrolyte, 125 mL/hr, Intravenous, Continuous      PRN Meds:  acetaminophen, 650 mg, Oral, Q6H PRN  albuterol, 2 puff, Inhalation, Q4H PRN  HYDROmorphone, 0.5 mg, Intravenous, Q3H PRN  ondansetron, 4 mg, Intravenous, Q6H PRN  oxyCODONE, 10 mg, Oral, Q4H PRN x1 thus far  oxyCODONE, 5 mg, Oral, Q4H PRN    scd    IP CONSULT TO GASTROENTEROLOGY    Network Utilization Review Department  ATTENTION: Please call with any questions or concerns to [phone] and carefully listen to the prompts so that you are directed to the right person. All voicemails are confidential.   For Discharge needs, contact Care Management DC Support Team at [phone] opt. 2  Send all requests for admission clinical reviews, approved or denied determinations and any other requests to dedicated fax number below belonging to the campus where the patient is receiving treatment. List of dedicated fax numbers for the Facilities:  FACILITY NAME UR FAX NUMBER   ADMISSION DENIALS (Administrative/Medical Necessity) [phone]   DISCHARGE SUPPORT TEAM (NETWORK) [phone]   PARENT CHILD HEALTH (Maternity/NICU/Pediatrics) [phone]   [facility] [phone]   [facility] [phone]   [facility] [phone]   [facility] [phone]   [facility] [phone]   [facility] [phone]   [facility] [phone]   [facility] [phone]   [facility] [phone]   [facility] [phone]   [facility] [phone]   [facility]  [facility] [phone]

## 2024-02-05 NOTE — PLAN OF CARE
Problem: Potential for Falls  Goal: Patient will remain free of falls  Description: INTERVENTIONS:  - Educate patient/family on patient safety including physical limitations  - Instruct patient to call for assistance with activity   - Consult OT/PT to assist with strengthening/mobility   - Keep Call bell within reach  - Keep bed low and locked with side rails adjusted as appropriate  - Keep care items and personal belongings within reach  - Initiate and maintain comfort rounds  - Make Fall Risk Sign visible to staff  - Apply yellow socks and bracelet for high fall risk patients  - Consider moving patient to room near nurses station  Outcome: Progressing     Problem: PAIN - ADULT  Goal: Verbalizes/displays adequate comfort level or baseline comfort level  Description: Interventions:  - Encourage patient to monitor pain and request assistance  - Assess pain using appropriate pain scale  - Administer analgesics based on type and severity of pain and evaluate response  - Implement non-pharmacological measures as appropriate and evaluate response  - Consider cultural and social influences on pain and pain management  - Notify physician/advanced practitioner if interventions unsuccessful or patient reports new pain  Outcome: Progressing

## 2024-02-05 NOTE — TELEPHONE ENCOUNTER
P/c'd , states he is presently in the hospital for non cardiac issues. He does not have his cardiomems pillow with him so we will not have any readings until he is home.

## 2024-02-05 NOTE — PROGRESS NOTES
Progress Note - General Surgery  Mesfin Cano 58 y.o. male MRN: [id number]  Unit/Bed#: CW2 214-01 Encounter: [id number]      Assessment:  58 y.o. male with PMH of asthma, afib (on Eliquis), CHF (EF 50%), DM, HTN, HLD, RYGB, and ventral hernia repair who presents with pancreatitis (likely 2/2 gallstones), hyperbilirubinemia, and CKD.    WBC 5.4 from 6.4  Hgb 8.8 from 9.7  Cr 1.97 from 2.06  AST/ALT/Alk phos: 91/66/363 from 85/55/340  T Bili 1.69 from 2.15  2/4 RUQ US: Cholelithiasis. No evidence of acute cholecystitis. No intra or extrahepatic biliary ductal dilatation.    Plan:  - NPO, plan to advance to clear liquid diet  - Continue IVF until tolerating diet  - Tentative plan for laparoscopic cholecystectomy once symptoms resolve.  - Trend LFTs, T Bili, follow up MRCP  - Hold Eliquis  - DVT ppx with SQH  - Continue statin, metoprolol, protonix, lyrica.   - PRN analgesia and antiemetics  - Encourage OOB, ambulation          Subjective: No acute events overnight. Afebrile, hemodynamically stable. No nausea, or vomiting, fevers or chills. Endorsing flatus, no BM. Has appetite. Pain improved from admission.        Objective:   Temp:  [98.2 °F (36.8 °C)-100.2 °F (37.9 °C)] 98.9 °F (37.2 °C)  HR:  [] 77  Resp:  [12-22] 21  BP: ()/(47-72) 102/57    Physical Exam:  General: No acute distress, alert and oriented  CV: Well perfused, regular rate and rhythm  Lungs: Normal work of breathing, no increased respiratory effort  Abdomen: Soft, mildly-tender, non-distended. Protuberant abdomen.  Extremities: No clubbing or cyanosis  Skin: Warm, dry      I/O         02/03 0701  02/04 0700 02/04 0701  02/05 0700    I.V.  1000    Total Intake  1000    Urine  1420    Total Output  1420    Net  -420                  Lab, Imaging and other studies: I have personally reviewed pertinent reports.  , CBC with diff:   Lab Results   Component Value Date    WBC 5.36 02/05/2024    HGB 8.8 (L) 02/05/2024    HCT 28.3 (L) 02/05/2024     MCV 83 02/05/2024     02/05/2024    RBC 3.41 (L) 02/05/2024    MCH 25.8 (L) 02/05/2024    MCHC 31.1 (L) 02/05/2024    RDW 17.2 (H) 02/05/2024    MPV 10.6 02/05/2024    NRBC 0 02/05/2024   , BMP/CMP:   Lab Results   Component Value Date    SODIUM 134 (L) 02/05/2024    K 4.4 02/05/2024    CL 96 02/05/2024    CO2 27 02/05/2024    BUN 29 (H) 02/05/2024    CREATININE 1.97 (H) 02/05/2024    CALCIUM 7.5 (L) 02/05/2024    AST 91 (H) 02/05/2024    ALT 66 (H) 02/05/2024    ALKPHOS 363 (H) 02/05/2024    EGFR 36 02/05/2024         Paulino Brown MD  General Surgery   02/05/24

## 2024-02-06 ENCOUNTER — TELEPHONE (OUTPATIENT)
Age: 59
End: 2024-02-06

## 2024-02-06 LAB
ALBUMIN SERPL BCP-MCNC: 3.1 G/DL (ref 3.5–5)
ALP SERPL-CCNC: 385 U/L (ref 34–104)
ALT SERPL W P-5'-P-CCNC: 60 U/L (ref 7–52)
ANION GAP SERPL CALCULATED.3IONS-SCNC: 11 MMOL/L
AST SERPL W P-5'-P-CCNC: 54 U/L (ref 13–39)
BASOPHILS # BLD AUTO: 0.04 THOUSANDS/ÂΜL (ref 0–0.1)
BASOPHILS NFR BLD AUTO: 1 % (ref 0–1)
BILIRUB SERPL-MCNC: 1.32 MG/DL (ref 0.2–1)
BUN SERPL-MCNC: 25 MG/DL (ref 5–25)
CALCIUM ALBUM COR SERPL-MCNC: 8.5 MG/DL (ref 8.3–10.1)
CALCIUM SERPL-MCNC: 7.8 MG/DL (ref 8.4–10.2)
CHLORIDE SERPL-SCNC: 97 MMOL/L (ref 96–108)
CO2 SERPL-SCNC: 28 MMOL/L (ref 21–32)
CREAT SERPL-MCNC: 1.82 MG/DL (ref 0.6–1.3)
EOSINOPHIL # BLD AUTO: 0.52 THOUSAND/ÂΜL (ref 0–0.61)
EOSINOPHIL NFR BLD AUTO: 12 % (ref 0–6)
ERYTHROCYTE [DISTWIDTH] IN BLOOD BY AUTOMATED COUNT: 16.8 % (ref 11.6–15.1)
FERRITIN SERPL-MCNC: 66 NG/ML (ref 24–336)
GFR SERPL CREATININE-BSD FRML MDRD: 40 ML/MIN/1.73SQ M
GLUCOSE SERPL-MCNC: 125 MG/DL (ref 65–140)
GLUCOSE SERPL-MCNC: 147 MG/DL (ref 65–140)
GLUCOSE SERPL-MCNC: 166 MG/DL (ref 65–140)
GLUCOSE SERPL-MCNC: 181 MG/DL (ref 65–140)
GLUCOSE SERPL-MCNC: 240 MG/DL (ref 65–140)
HCT VFR BLD AUTO: 29.5 % (ref 36.5–49.3)
HGB BLD-MCNC: 9.1 G/DL (ref 12–17)
IMM GRANULOCYTES # BLD AUTO: 0.03 THOUSAND/UL (ref 0–0.2)
IMM GRANULOCYTES NFR BLD AUTO: 1 % (ref 0–2)
IRON SATN MFR SERPL: 7 % (ref 15–50)
IRON SERPL-MCNC: 23 UG/DL (ref 50–212)
LYMPHOCYTES # BLD AUTO: 0.8 THOUSANDS/ÂΜL (ref 0.6–4.47)
LYMPHOCYTES NFR BLD AUTO: 18 % (ref 14–44)
MCH RBC QN AUTO: 25.9 PG (ref 26.8–34.3)
MCHC RBC AUTO-ENTMCNC: 30.8 G/DL (ref 31.4–37.4)
MCV RBC AUTO: 84 FL (ref 82–98)
MONOCYTES # BLD AUTO: 0.54 THOUSAND/ÂΜL (ref 0.17–1.22)
MONOCYTES NFR BLD AUTO: 12 % (ref 4–12)
NEUTROPHILS # BLD AUTO: 2.55 THOUSANDS/ÂΜL (ref 1.85–7.62)
NEUTS SEG NFR BLD AUTO: 56 % (ref 43–75)
NRBC BLD AUTO-RTO: 0 /100 WBCS
PLATELET # BLD AUTO: 218 THOUSANDS/UL (ref 149–390)
PMV BLD AUTO: 10.8 FL (ref 8.9–12.7)
POTASSIUM SERPL-SCNC: 3.5 MMOL/L (ref 3.5–5.3)
PROT SERPL-MCNC: 5.7 G/DL (ref 6.4–8.4)
RBC # BLD AUTO: 3.52 MILLION/UL (ref 3.88–5.62)
SODIUM SERPL-SCNC: 136 MMOL/L (ref 135–147)
TIBC SERPL-MCNC: 313 UG/DL (ref 250–450)
UIBC SERPL-MCNC: 290 UG/DL (ref 155–355)
WBC # BLD AUTO: 4.48 THOUSAND/UL (ref 4.31–10.16)

## 2024-02-06 PROCEDURE — 99223 1ST HOSP IP/OBS HIGH 75: CPT | Performed by: INTERNAL MEDICINE

## 2024-02-06 PROCEDURE — 80053 COMPREHEN METABOLIC PANEL: CPT | Performed by: NURSE PRACTITIONER

## 2024-02-06 PROCEDURE — 85025 COMPLETE CBC W/AUTO DIFF WBC: CPT | Performed by: NURSE PRACTITIONER

## 2024-02-06 PROCEDURE — 82728 ASSAY OF FERRITIN: CPT | Performed by: STUDENT IN AN ORGANIZED HEALTH CARE EDUCATION/TRAINING PROGRAM

## 2024-02-06 PROCEDURE — 82948 REAGENT STRIP/BLOOD GLUCOSE: CPT

## 2024-02-06 PROCEDURE — 99233 SBSQ HOSP IP/OBS HIGH 50: CPT | Performed by: SURGERY

## 2024-02-06 PROCEDURE — 83540 ASSAY OF IRON: CPT | Performed by: STUDENT IN AN ORGANIZED HEALTH CARE EDUCATION/TRAINING PROGRAM

## 2024-02-06 PROCEDURE — 83550 IRON BINDING TEST: CPT | Performed by: STUDENT IN AN ORGANIZED HEALTH CARE EDUCATION/TRAINING PROGRAM

## 2024-02-06 RX ORDER — POTASSIUM CHLORIDE 20 MEQ/1
40 TABLET, EXTENDED RELEASE ORAL ONCE
Status: COMPLETED | OUTPATIENT
Start: 2024-02-06 | End: 2024-02-06

## 2024-02-06 RX ORDER — SODIUM CHLORIDE, SODIUM GLUCONATE, SODIUM ACETATE, POTASSIUM CHLORIDE, MAGNESIUM CHLORIDE, SODIUM PHOSPHATE, DIBASIC, AND POTASSIUM PHOSPHATE .53; .5; .37; .037; .03; .012; .00082 G/100ML; G/100ML; G/100ML; G/100ML; G/100ML; G/100ML; G/100ML
125 INJECTION, SOLUTION INTRAVENOUS CONTINUOUS
Status: DISCONTINUED | OUTPATIENT
Start: 2024-02-06 | End: 2024-02-08

## 2024-02-06 RX ADMIN — BUDESONIDE AND FORMOTEROL FUMARATE DIHYDRATE 2 PUFF: 160; 4.5 AEROSOL RESPIRATORY (INHALATION) at 08:39

## 2024-02-06 RX ADMIN — INSULIN LISPRO 4 UNITS: 100 INJECTION, SOLUTION INTRAVENOUS; SUBCUTANEOUS at 11:14

## 2024-02-06 RX ADMIN — UMECLIDINIUM 1 PUFF: 62.5 AEROSOL, POWDER ORAL at 08:39

## 2024-02-06 RX ADMIN — HEPARIN SODIUM 7500 UNITS: 5000 INJECTION INTRAVENOUS; SUBCUTANEOUS at 21:33

## 2024-02-06 RX ADMIN — INSULIN LISPRO 2 UNITS: 100 INJECTION, SOLUTION INTRAVENOUS; SUBCUTANEOUS at 21:38

## 2024-02-06 RX ADMIN — LORATADINE 10 MG: 10 TABLET ORAL at 08:37

## 2024-02-06 RX ADMIN — ATORVASTATIN CALCIUM 10 MG: 10 TABLET, FILM COATED ORAL at 08:37

## 2024-02-06 RX ADMIN — HEPARIN SODIUM 7500 UNITS: 5000 INJECTION INTRAVENOUS; SUBCUTANEOUS at 13:09

## 2024-02-06 RX ADMIN — SODIUM CHLORIDE, SODIUM GLUCONATE, SODIUM ACETATE, POTASSIUM CHLORIDE, MAGNESIUM CHLORIDE, SODIUM PHOSPHATE, DIBASIC, AND POTASSIUM PHOSPHATE 125 ML/HR: .53; .5; .37; .037; .03; .012; .00082 INJECTION, SOLUTION INTRAVENOUS at 10:12

## 2024-02-06 RX ADMIN — PREGABALIN 50 MG: 50 CAPSULE ORAL at 21:37

## 2024-02-06 RX ADMIN — PANTOPRAZOLE SODIUM 40 MG: 40 TABLET, DELAYED RELEASE ORAL at 08:36

## 2024-02-06 RX ADMIN — POTASSIUM CHLORIDE 40 MEQ: 1500 TABLET, EXTENDED RELEASE ORAL at 09:12

## 2024-02-06 RX ADMIN — HEPARIN SODIUM 7500 UNITS: 5000 INJECTION INTRAVENOUS; SUBCUTANEOUS at 06:17

## 2024-02-06 NOTE — PROGRESS NOTES
Progress Note - General Surgery  Mesfin Cano 58 y.o. male MRN: [id number]  Unit/Bed#: CW2 214-01 Encounter: [id number]      Assessment:  58 y.o. male with PMH of asthma, afib (on Eliquis), CHF (EF 50%), DM, HTN, HLD, RYGB, and ventral hernia repair who presents with pancreatitis (likely 2/2 gallstones), hyperbilirubinemia, and CKD.      Afebrile, HDS    Cr: 1.82 from 1.97  T Bili 1.32 from 1.69  WBC 4.48 from 5.36  Hgb 9.1 from 8.8  AST/ALT/Alk Phos: 54/60/385 from 91/66/363    Plan:  - CLD, will advance to low fat diet pending MRCP  - Continue IVF @ 50 cc/hr  - Follow up MRCP results  - Trend LFTs, T Bili  - Laparoscopic cholecystectomy this admission. Tentatively 2/7.  - Eliquis held  - DVT ppx with SQH  - Continue statin, metoprolol, protonix, lyrica  - PRN analgesia and antiemetics  - Encourage OOB, ambulation        Subjective: No acute events overnight. Afebrile, hemodynamically stable. Tolerating diet. No nausea, or vomiting, fevers or chills. Passing flatus and having Bms.        Objective:   Temp:  [98.1 °F (36.7 °C)] 98.1 °F (36.7 °C)  HR:  [66-75] 72  BP: (101-104)/(57-61) 101/61    Physical Exam:  General: No acute distress, alert and oriented  CV: Well perfused, regular rate and rhythm  Lungs: Normal work of breathing, no increased respiratory effort  Abdomen: Soft, mildly tender in RUQ, non-distended, protuberant  Extremities: No edema, clubbing or cyanosis  Skin: Warm, dry      I/O         02/04 0701  02/05 0700 02/05 0701  02/06 0700    P.O.  640    I.V. (mL/kg) 1000     Total Intake(mL/kg) 1000 640 (4.2)    Urine (mL/kg/hr) 1420 1000 (0.3)    Total Output 1420 1000    Net -420 -360                  Lab, Imaging and other studies: I have personally reviewed pertinent reports.  , CBC with diff:   Lab Results   Component Value Date    WBC 4.48 02/06/2024    HGB 9.1 (L) 02/06/2024    HCT 29.5 (L) 02/06/2024    MCV 84 02/06/2024     02/06/2024    RBC 3.52 (L) 02/06/2024    MCH 25.9 (L) 02/06/2024     MCHC 30.8 (L) 02/06/2024    RDW 16.8 (H) 02/06/2024    MPV 10.8 02/06/2024    NRBC 0 02/06/2024   , BMP/CMP:   Lab Results   Component Value Date    SODIUM 136 02/06/2024    K 3.5 02/06/2024    CL 97 02/06/2024    CO2 28 02/06/2024    BUN 25 02/06/2024    CREATININE 1.82 (H) 02/06/2024    CALCIUM 7.8 (L) 02/06/2024    AST 54 (H) 02/06/2024    ALT 60 (H) 02/06/2024    ALKPHOS 385 (H) 02/06/2024    EGFR 40 02/06/2024     Pauilno Brown MD  General Surgery   02/06/24

## 2024-02-06 NOTE — CONSULTS
Consultation -  [facility]  Mesfin Cano 58 y.o. male MRN: [id number]  Unit/Bed#: [facility] 613-01 Encounter: [id number]            Inpatient consult to gastroenterology     Date/Time  2/6/2024 2:40 PM     Performed by  Ginger Espitia DO   Authorized by  Harvey Corbett MD             Reason for Consult / Principal Problem:     Gallstone pancreatitis    ASSESSMENT AND PLAN:      58-year-old male with history significant for Samuel-en-Y gastric bypass, HFrEF (EF 50% was (, A-fib on Eliquis, ventral hernia, and asthma, who presented to the hospital with postprandial epigastric abdominal pain, found to have acute gallstone pancreatitis with elevated lipase and inflammation seen on imaging.  He was also found to have elevated liver enzymes along with gallstones within the gallbladder and 2 small stones in the distal CBD concerning for choledocholithiasis.    He remains clinically stable with well-controlled pain.  He is tolerating liquid intake and would like to advance his diet.  He has gallstone pancreatitis which appears to be improving.  Liver enzymes remain elevated with improvement in total bilirubin.  In the setting of acute gallstone pancreatitis there is a high risk for post ERCP pancreatitis. Patient is planned for laparoscopic cholecystectomy tomorrow, will discuss with surgery team if they feel an intraoperative cholangiogram is a possible option to determine need for ERCP versus proceeding with lap-assisted ERCP without IOC.    Okay to advance diet to low-fat, low fiber.  Monitor liver enzymes daily.  N.p.o. after midnight.  Potential plan for laparoscopic ERCP tomorrow.    Rest of care per primary team.  Thank you for this consultation.   ______________________________________________________________________    HPI:  Keon Cano is a 58-year-old male with history significant for Samuel-en-Y gastric bypass, HFrEF (EF 50% was (, A-fib on Eliquis, ventral hernia, and asthma, who we are asked to see in  consultation for gallstone pancreatitis and possible choledocholithiasis.    Patient initially presented to the hospital with sudden onset of post prandial epigastric pain starting Friday.  He denies any fevers, chills, nausea, vomiting, diarrhea, constipation.  No new medications.  No history of similar abdominal pain.  He does endorse some intermittent right upper quadrant pain.  On admission to the hospital he was found to have markedly elevated lipase of 4,994 with evidence of mild pancreatic inflammation in addition to gallstones within the gallbladder and evidence of a 5 mm focus of low signal in the distal CBD along with all her stone distally concerning for choledocholithiasis.  He was found to have scattered small cystic areas in the pancreas representing of of small pseudocysts versus side duct IPMNs.  Liver enzymes elevated on admission with AST 85, ALT 55, alk phos 340, and total bilirubin 2.15. No prior history of pancreatitis.    No prior EGD or colonoscopy.  REVIEW OF SYSTEMS:  10 point ROS reviewed and negative except otherwise noted in the HPI above.     Historical Information   Past Medical History:   Diagnosis Date    Acute gastritis without hemorrhage     last assessed 3/24/17    Asthma     Atrial fibrillation (HCC)     Bilateral leg edema 02/19/2020    CHF (congestive heart failure) (HCC)     Coronary artery disease     Daytime sleepiness 07/17/2019    Diabetes mellitus (HCC)     Dyspnea on exertion 10/11/2021    Edema of both feet 01/23/2020    Gastric bypass status for obesity     Hyperlipidemia     Hypertension     Hypokalemia 11/14/2021    Impaired fasting glucose     last assessed 5/10/17    Knee sprain, bilateral 06/14/2018    Leg cramps 06/16/2022    Obesity     Viral gastroenteritis     last assessed 11/4/16     Past Surgical History:   Procedure Laterality Date    CARDIAC CATHETERIZATION N/A 3/9/2022    Procedure: CARDIAC RHC W/ PRESSURE SENSOR;  Surgeon: Aminata Wilcox MD;   Location: [facility];  Service: Cardiology    CARDIAC CATHETERIZATION N/A 9/6/2022    Procedure: Cardiac catheterization;  Surgeon: Yolanda Del Rosario DO;  Location: [facility];  Service: Cardiology    CARDIAC CATHETERIZATION N/A 9/6/2022    Procedure: Cardiac Coronary Angiogram;  Surgeon: Yolanda Del Rosario DO;  Location: [facility];  Service: Cardiology    CARDIAC CATHETERIZATION N/A 10/11/2023    Procedure: Cardiac RHC;  Surgeon: Yoel Darden MD;  Location: [facility];  Service: Cardiology    CARPAL TUNNEL RELEASE      bilateral    GASTRIC BYPASS  2004    with han en y    HERNIA REPAIR      ventral inscisional    IR BIOPSY BONE MARROW  12/14/2023    TONSILLECTOMY       Social History   Social History     Substance and Sexual Activity   Alcohol Use Yes    Alcohol/week: 2.0 standard drinks of alcohol    Types: 2 Shots of liquor per week    Comment: social     Social History     Substance and Sexual Activity   Drug Use No     Social History     Tobacco Use   Smoking Status Former    Types: Pipe, Cigars    Passive exposure: Never   Smokeless Tobacco Never   Tobacco Comments    cigar once a day     Family History   Problem Relation Age of Onset    Stroke Mother     Hypertension Father     Cancer Father     COPD Father        Meds/Allergies     Medications Prior to Admission   Medication    Accu-Chek FastClix Lancets MISC    albuterol (PROVENTIL HFA,VENTOLIN HFA) 90 mcg/act inhaler    apixaban (Eliquis) 5 mg    atorvastatin (LIPITOR) 10 mg tablet    Blood Glucose Monitoring Suppl (Accu-Chek Guide) w/Device KIT    budesonide-formoterol (Symbicort) 160-4.5 mcg/act inhaler    bumetanide (BUMEX) 2 mg tablet    Empagliflozin (JARDIANCE) 10 MG TABS tablet    EPINEPHrine (EPIPEN) 0.3 mg/0.3 mL SOAJ    fluticasone (FLONASE) 50 mcg/act nasal spray    Klor-Con M20 20 MEQ tablet    loratadine (CLARITIN) 10 mg tablet    metolazone (ZAROXOLYN) 2.5 mg tablet    metoprolol succinate (TOPROL-XL) 50  mg 24 hr tablet    montelukast (SINGULAIR) 10 mg tablet    pantoprazole (PROTONIX) 40 mg tablet    potassium chloride (K-DUR,KLOR-CON) 20 mEq tablet    pregabalin (LYRICA) 50 mg capsule    sacubitril-valsartan (Entresto) 24-26 MG TABS    sitaGLIPtin (Januvia) 100 mg tablet    spironolactone (ALDACTONE) 25 mg tablet    tiotropium (Spiriva Respimat) 1.25 MCG/ACT AERS inhaler     Current Facility-Administered Medications   Medication Dose Route Frequency    acetaminophen (TYLENOL) tablet 650 mg  650 mg Oral Q6H PRN    albuterol (PROVENTIL HFA,VENTOLIN HFA) inhaler 2 puff  2 puff Inhalation Q4H PRN    atorvastatin (LIPITOR) tablet 10 mg  10 mg Oral Daily    budesonide-formoterol (SYMBICORT) 160-4.5 mcg/act inhaler 2 puff  2 puff Inhalation BID    heparin (porcine) subcutaneous injection 7,500 Units  7,500 Units Subcutaneous Q8H ANA    HYDROmorphone (DILAUDID) injection 0.5 mg  0.5 mg Intravenous Q3H PRN    insulin lispro (HumaLOG) 100 units/mL subcutaneous injection 2-12 Units  2-12 Units Subcutaneous TID AC    insulin lispro (HumaLOG) 100 units/mL subcutaneous injection 2-12 Units  2-12 Units Subcutaneous HS    loratadine (CLARITIN) tablet 10 mg  10 mg Oral Daily    metoprolol succinate (TOPROL-XL) 24 hr tablet 50 mg  50 mg Oral Daily    multi-electrolyte (PLASMALYTE-A/ISOLYTE-S PH 7.4) IV solution  125 mL/hr Intravenous Continuous    ondansetron (ZOFRAN) injection 4 mg  4 mg Intravenous Q6H PRN    oxyCODONE (ROXICODONE) immediate release tablet 10 mg  10 mg Oral Q4H PRN    oxyCODONE (ROXICODONE) IR tablet 5 mg  5 mg Oral Q4H PRN    pantoprazole (PROTONIX) EC tablet 40 mg  40 mg Oral Daily    pregabalin (LYRICA) capsule 50 mg  50 mg Oral HS    umeclidinium 62.5 mcg/actuation inhaler AEPB 1 puff  1 puff Inhalation Daily       Allergies   Allergen Reactions    Azithromycin Other (See Comments)     Shaky, uneasy feeling     Bactrim [Sulfamethoxazole-Trimethoprim] Other (See Comments)     shakiness         Objective  "    Blood pressure 101/61, pulse 72, temperature 98.1 °F (36.7 °C), resp. rate 21, height 6' 1\" (1.854 m), weight (!) 152 kg (336 lb), SpO2 96%. Body mass index is 44.33 kg/m².    PHYSICAL EXAM:    General: Well-appearing, NAD  Eyes: no conjunctival icterus or pallor  Abdominal: Soft, obese, TTP epigastrium and RUQ, non-distended  Extremities: Warm, no deformities, no edema  Neuro: alert and oriented  Psych: Normal affect    Lab Results:   Admission on 02/04/2024   Component Date Value    Ventricular Rate 02/04/2024 103     Atrial Rate 02/04/2024 103     DC Interval 02/04/2024 130     QRSD Interval 02/04/2024 82     QT Interval 02/04/2024 354     QTC Interval 02/04/2024 463     P Axis 02/04/2024 56     QRS Axis 02/04/2024 81     T Wave Axis 02/04/2024 21     WBC 02/04/2024 6.38     RBC 02/04/2024 3.76 (L)     Hemoglobin 02/04/2024 9.7 (L)     Hematocrit 02/04/2024 30.7 (L)     MCV 02/04/2024 82     MCH 02/04/2024 25.8 (L)     MCHC 02/04/2024 31.6     RDW 02/04/2024 16.9 (H)     MPV 02/04/2024 10.4     Platelets 02/04/2024 283     nRBC 02/04/2024 0     Neutrophils Relative 02/04/2024 70     Immat GRANS % 02/04/2024 1     Lymphocytes Relative 02/04/2024 12 (L)     Monocytes Relative 02/04/2024 11     Eosinophils Relative 02/04/2024 5     Basophils Relative 02/04/2024 1     Neutrophils Absolute 02/04/2024 4.49     Immature Grans Absolute 02/04/2024 0.04     Lymphocytes Absolute 02/04/2024 0.76     Monocytes Absolute 02/04/2024 0.73     Eosinophils Absolute 02/04/2024 0.31     Basophils Absolute 02/04/2024 0.05     hs TnI 0hr 02/04/2024 17     Sodium 02/04/2024 133 (L)     Potassium 02/04/2024 4.0     Chloride 02/04/2024 98     CO2 02/04/2024 21     ANION GAP 02/04/2024 14     BUN 02/04/2024 30 (H)     Creatinine 02/04/2024 2.06 (H)     Glucose 02/04/2024 109     Calcium 02/04/2024 8.1 (L)     AST 02/04/2024 85 (H)     ALT 02/04/2024 55 (H)     Alkaline Phosphatase 02/04/2024 340 (H)     Total Protein 02/04/2024 6.5     " Albumin 02/04/2024 3.5     Total Bilirubin 02/04/2024 2.15 (H)     eGFR 02/04/2024 34     Lipase 02/04/2024 4,994 (H)     BNP 02/04/2024 165 (H)     hs TnI 2hr 02/04/2024 13     Delta 2hr hsTnI 02/04/2024 -4     hs TnI 4hr 02/04/2024 14     Delta 4hr hsTnI 02/04/2024 -3     POC Glucose 02/05/2024 137     Sodium 02/05/2024 134 (L)     Potassium 02/05/2024 4.4     Chloride 02/05/2024 96     CO2 02/05/2024 27     ANION GAP 02/05/2024 11     BUN 02/05/2024 29 (H)     Creatinine 02/05/2024 1.97 (H)     Glucose 02/05/2024 122     Glucose, Fasting 02/05/2024 122 (H)     Calcium 02/05/2024 7.5 (L)     Corrected Calcium 02/05/2024 8.3     AST 02/05/2024 91 (H)     ALT 02/05/2024 66 (H)     Alkaline Phosphatase 02/05/2024 363 (H)     Total Protein 02/05/2024 5.6 (L)     Albumin 02/05/2024 3.0 (L)     Total Bilirubin 02/05/2024 1.69 (H)     eGFR 02/05/2024 36     WBC 02/05/2024 5.36     RBC 02/05/2024 3.41 (L)     Hemoglobin 02/05/2024 8.8 (L)     Hematocrit 02/05/2024 28.3 (L)     MCV 02/05/2024 83     MCH 02/05/2024 25.8 (L)     MCHC 02/05/2024 31.1 (L)     RDW 02/05/2024 17.2 (H)     MPV 02/05/2024 10.6     Platelets 02/05/2024 210     nRBC 02/05/2024 0     Neutrophils Relative 02/05/2024 68     Immat GRANS % 02/05/2024 1     Lymphocytes Relative 02/05/2024 11 (L)     Monocytes Relative 02/05/2024 12     Eosinophils Relative 02/05/2024 7 (H)     Basophils Relative 02/05/2024 1     Neutrophils Absolute 02/05/2024 3.67     Immature Grans Absolute 02/05/2024 0.04     Lymphocytes Absolute 02/05/2024 0.61     Monocytes Absolute 02/05/2024 0.65     Eosinophils Absolute 02/05/2024 0.35     Basophils Absolute 02/05/2024 0.04     Magnesium 02/05/2024 2.1     Phosphorus 02/05/2024 3.8     POC Glucose 02/05/2024 126     POC Glucose 02/05/2024 183 (H)     Ventricular Rate 02/04/2024 89     Atrial Rate 02/04/2024 89     AL Interval 02/04/2024 140     QRSD Interval 02/04/2024 86     QT Interval 02/04/2024 384     QTC Interval 02/04/2024  467     P [place] 02/04/2024 79     QRS Axis 02/04/2024 74     T Wave [place] 02/04/2024 79     POC Glucose 02/05/2024 144 (H)     POC Glucose 02/05/2024 219 (H)     WBC 02/06/2024 4.48     RBC 02/06/2024 3.52 (L)     Hemoglobin 02/06/2024 9.1 (L)     Hematocrit 02/06/2024 29.5 (L)     MCV 02/06/2024 84     MCH 02/06/2024 25.9 (L)     MCHC 02/06/2024 30.8 (L)     RDW 02/06/2024 16.8 (H)     MPV 02/06/2024 10.8     Platelets 02/06/2024 218     nRBC 02/06/2024 0     Neutrophils Relative 02/06/2024 56     Immat GRANS % 02/06/2024 1     Lymphocytes Relative 02/06/2024 18     Monocytes Relative 02/06/2024 12     Eosinophils Relative 02/06/2024 12 (H)     Basophils Relative 02/06/2024 1     Neutrophils Absolute 02/06/2024 2.55     Immature Grans Absolute 02/06/2024 0.03     Lymphocytes Absolute 02/06/2024 0.80     Monocytes Absolute 02/06/2024 0.54     Eosinophils Absolute 02/06/2024 0.52     Basophils Absolute 02/06/2024 0.04     Sodium 02/06/2024 136     Potassium 02/06/2024 3.5     Chloride 02/06/2024 97     CO2 02/06/2024 28     ANION GAP 02/06/2024 11     BUN 02/06/2024 25     Creatinine 02/06/2024 1.82 (H)     Glucose 02/06/2024 147 (H)     Calcium 02/06/2024 7.8 (L)     Corrected Calcium 02/06/2024 8.5     AST 02/06/2024 54 (H)     ALT 02/06/2024 60 (H)     Alkaline Phosphatase 02/06/2024 385 (H)     Total Protein 02/06/2024 5.7 (L)     Albumin 02/06/2024 3.1 (L)     Total Bilirubin 02/06/2024 1.32 (H)     eGFR 02/06/2024 40     POC Glucose 02/06/2024 166 (H)     Iron Saturation 02/06/2024 7 (L)     TIBC 02/06/2024 313     Iron 02/06/2024 23 (L)     UIBC 02/06/2024 290     Ferritin 02/06/2024 66     POC Glucose 02/06/2024 240 (H)        Imaging Studies: I have personally reviewed pertinent imaging studies.    Ginger Espitia D.O.  Fellow, PGY-5  Division of Gastroenterology & Hepatology  Available on [facility]  2/6/2024 2:24 PM

## 2024-02-07 ENCOUNTER — ANESTHESIA (INPATIENT)
Dept: PERIOP | Facility: HOSPITAL | Age: 59
DRG: 414 | End: 2024-02-07
Payer: COMMERCIAL

## 2024-02-07 ENCOUNTER — ANESTHESIA EVENT (INPATIENT)
Dept: PERIOP | Facility: HOSPITAL | Age: 59
DRG: 414 | End: 2024-02-07
Payer: COMMERCIAL

## 2024-02-07 ENCOUNTER — APPOINTMENT (OUTPATIENT)
Dept: PERIOP | Facility: HOSPITAL | Age: 59
DRG: 414 | End: 2024-02-07
Payer: COMMERCIAL

## 2024-02-07 ENCOUNTER — APPOINTMENT (INPATIENT)
Dept: RADIOLOGY | Facility: HOSPITAL | Age: 59
DRG: 414 | End: 2024-02-07
Payer: COMMERCIAL

## 2024-02-07 LAB
ABO GROUP BLD: NORMAL
ABO GROUP BLD: NORMAL
ALBUMIN SERPL BCP-MCNC: 2.9 G/DL (ref 3.5–5)
ALP SERPL-CCNC: 344 U/L (ref 34–104)
ALT SERPL W P-5'-P-CCNC: 47 U/L (ref 7–52)
ANION GAP SERPL CALCULATED.3IONS-SCNC: 9 MMOL/L
AST SERPL W P-5'-P-CCNC: 33 U/L (ref 13–39)
ATRIAL RATE: 89 BPM
BASOPHILS # BLD AUTO: 0.03 THOUSANDS/ÂΜL (ref 0–0.1)
BASOPHILS NFR BLD AUTO: 1 % (ref 0–1)
BILIRUB SERPL-MCNC: 0.99 MG/DL (ref 0.2–1)
BLD GP AB SCN SERPL QL: NEGATIVE
BUN SERPL-MCNC: 20 MG/DL (ref 5–25)
CALCIUM ALBUM COR SERPL-MCNC: 9.1 MG/DL (ref 8.3–10.1)
CALCIUM SERPL-MCNC: 8.2 MG/DL (ref 8.4–10.2)
CHLORIDE SERPL-SCNC: 100 MMOL/L (ref 96–108)
CO2 SERPL-SCNC: 28 MMOL/L (ref 21–32)
CREAT SERPL-MCNC: 1.65 MG/DL (ref 0.6–1.3)
EOSINOPHIL # BLD AUTO: 0.57 THOUSAND/ÂΜL (ref 0–0.61)
EOSINOPHIL NFR BLD AUTO: 14 % (ref 0–6)
ERYTHROCYTE [DISTWIDTH] IN BLOOD BY AUTOMATED COUNT: 16.7 % (ref 11.6–15.1)
GFR SERPL CREATININE-BSD FRML MDRD: 45 ML/MIN/1.73SQ M
GLUCOSE SERPL-MCNC: 121 MG/DL (ref 65–140)
GLUCOSE SERPL-MCNC: 122 MG/DL (ref 65–140)
GLUCOSE SERPL-MCNC: 144 MG/DL (ref 65–140)
HCT VFR BLD AUTO: 29 % (ref 36.5–49.3)
HGB BLD-MCNC: 8.8 G/DL (ref 12–17)
IMM GRANULOCYTES # BLD AUTO: 0.02 THOUSAND/UL (ref 0–0.2)
IMM GRANULOCYTES NFR BLD AUTO: 1 % (ref 0–2)
LYMPHOCYTES # BLD AUTO: 0.9 THOUSANDS/ÂΜL (ref 0.6–4.47)
LYMPHOCYTES NFR BLD AUTO: 22 % (ref 14–44)
MCH RBC QN AUTO: 25.8 PG (ref 26.8–34.3)
MCHC RBC AUTO-ENTMCNC: 30.3 G/DL (ref 31.4–37.4)
MCV RBC AUTO: 85 FL (ref 82–98)
MONOCYTES # BLD AUTO: 0.38 THOUSAND/ÂΜL (ref 0.17–1.22)
MONOCYTES NFR BLD AUTO: 9 % (ref 4–12)
NEUTROPHILS # BLD AUTO: 2.26 THOUSANDS/ÂΜL (ref 1.85–7.62)
NEUTS SEG NFR BLD AUTO: 53 % (ref 43–75)
NRBC BLD AUTO-RTO: 0 /100 WBCS
P AXIS: 79 DEGREES
PLATELET # BLD AUTO: 216 THOUSANDS/UL (ref 149–390)
PMV BLD AUTO: 10.6 FL (ref 8.9–12.7)
POTASSIUM SERPL-SCNC: 3.7 MMOL/L (ref 3.5–5.3)
PR INTERVAL: 140 MS
PROT SERPL-MCNC: 5.4 G/DL (ref 6.4–8.4)
QRS AXIS: 74 DEGREES
QRSD INTERVAL: 86 MS
QT INTERVAL: 384 MS
QTC INTERVAL: 467 MS
RBC # BLD AUTO: 3.41 MILLION/UL (ref 3.88–5.62)
RH BLD: POSITIVE
RH BLD: POSITIVE
SODIUM SERPL-SCNC: 137 MMOL/L (ref 135–147)
SPECIMEN EXPIRATION DATE: NORMAL
T WAVE AXIS: 79 DEGREES
VENTRICULAR RATE: 89 BPM
WBC # BLD AUTO: 4.16 THOUSAND/UL (ref 4.31–10.16)

## 2024-02-07 PROCEDURE — 85025 COMPLETE CBC W/AUTO DIFF WBC: CPT | Performed by: SURGERY

## 2024-02-07 PROCEDURE — 82330 ASSAY OF CALCIUM: CPT

## 2024-02-07 PROCEDURE — 82947 ASSAY GLUCOSE BLOOD QUANT: CPT

## 2024-02-07 PROCEDURE — 86901 BLOOD TYPING SEROLOGIC RH(D): CPT | Performed by: SURGERY

## 2024-02-07 PROCEDURE — 99233 SBSQ HOSP IP/OBS HIGH 50: CPT | Performed by: SURGERY

## 2024-02-07 PROCEDURE — 86920 COMPATIBILITY TEST SPIN: CPT

## 2024-02-07 PROCEDURE — 84132 ASSAY OF SERUM POTASSIUM: CPT

## 2024-02-07 PROCEDURE — 47605 CHOLECYSTECTOMY W/CHOLANG: CPT | Performed by: SURGERY

## 2024-02-07 PROCEDURE — 85014 HEMATOCRIT: CPT

## 2024-02-07 PROCEDURE — 84295 ASSAY OF SERUM SODIUM: CPT

## 2024-02-07 PROCEDURE — 86900 BLOOD TYPING SEROLOGIC ABO: CPT | Performed by: SURGERY

## 2024-02-07 PROCEDURE — 74300 X-RAY BILE DUCTS/PANCREAS: CPT | Performed by: SURGERY

## 2024-02-07 PROCEDURE — 74328 X-RAY BILE DUCT ENDOSCOPY: CPT

## 2024-02-07 PROCEDURE — C1769 GUIDE WIRE: HCPCS | Performed by: SURGERY

## 2024-02-07 PROCEDURE — 86850 RBC ANTIBODY SCREEN: CPT | Performed by: SURGERY

## 2024-02-07 PROCEDURE — 74300 X-RAY BILE DUCTS/PANCREAS: CPT

## 2024-02-07 PROCEDURE — 80053 COMPREHEN METABOLIC PANEL: CPT | Performed by: SURGERY

## 2024-02-07 PROCEDURE — 93010 ELECTROCARDIOGRAM REPORT: CPT | Performed by: INTERNAL MEDICINE

## 2024-02-07 PROCEDURE — 43610 EXCISION OF STOMACH LESION: CPT | Performed by: SURGERY

## 2024-02-07 PROCEDURE — 88304 TISSUE EXAM BY PATHOLOGIST: CPT | Performed by: PATHOLOGY

## 2024-02-07 PROCEDURE — 0FC98ZZ EXTIRPATION OF MATTER FROM COMMON BILE DUCT, VIA NATURAL OR ARTIFICIAL OPENING ENDOSCOPIC: ICD-10-PCS | Performed by: SURGERY

## 2024-02-07 PROCEDURE — 0DB60ZZ EXCISION OF STOMACH, OPEN APPROACH: ICD-10-PCS | Performed by: SURGERY

## 2024-02-07 PROCEDURE — 82948 REAGENT STRIP/BLOOD GLUCOSE: CPT

## 2024-02-07 PROCEDURE — BF131ZZ FLUOROSCOPY OF GALLBLADDER AND BILE DUCTS USING LOW OSMOLAR CONTRAST: ICD-10-PCS | Performed by: SURGERY

## 2024-02-07 PROCEDURE — 82803 BLOOD GASES ANY COMBINATION: CPT

## 2024-02-07 PROCEDURE — 0FT40ZZ RESECTION OF GALLBLADDER, OPEN APPROACH: ICD-10-PCS | Performed by: SURGERY

## 2024-02-07 RX ORDER — ROCURONIUM BROMIDE 10 MG/ML
INJECTION, SOLUTION INTRAVENOUS AS NEEDED
Status: DISCONTINUED | OUTPATIENT
Start: 2024-02-07 | End: 2024-02-08

## 2024-02-07 RX ORDER — ALBUMIN, HUMAN INJ 5% 5 %
SOLUTION INTRAVENOUS CONTINUOUS PRN
Status: DISCONTINUED | OUTPATIENT
Start: 2024-02-07 | End: 2024-02-08

## 2024-02-07 RX ORDER — FENTANYL CITRATE 50 UG/ML
INJECTION, SOLUTION INTRAMUSCULAR; INTRAVENOUS AS NEEDED
Status: DISCONTINUED | OUTPATIENT
Start: 2024-02-07 | End: 2024-02-08

## 2024-02-07 RX ORDER — PROPOFOL 10 MG/ML
INJECTION, EMULSION INTRAVENOUS AS NEEDED
Status: DISCONTINUED | OUTPATIENT
Start: 2024-02-07 | End: 2024-02-08

## 2024-02-07 RX ORDER — MIDAZOLAM HYDROCHLORIDE 2 MG/2ML
INJECTION, SOLUTION INTRAMUSCULAR; INTRAVENOUS AS NEEDED
Status: DISCONTINUED | OUTPATIENT
Start: 2024-02-07 | End: 2024-02-08

## 2024-02-07 RX ORDER — DEXAMETHASONE SODIUM PHOSPHATE 10 MG/ML
INJECTION, SOLUTION INTRAMUSCULAR; INTRAVENOUS AS NEEDED
Status: DISCONTINUED | OUTPATIENT
Start: 2024-02-07 | End: 2024-02-08

## 2024-02-07 RX ORDER — SODIUM CHLORIDE, SODIUM LACTATE, POTASSIUM CHLORIDE, CALCIUM CHLORIDE 600; 310; 30; 20 MG/100ML; MG/100ML; MG/100ML; MG/100ML
125 INJECTION, SOLUTION INTRAVENOUS CONTINUOUS
Status: DISCONTINUED | OUTPATIENT
Start: 2024-02-07 | End: 2024-02-08

## 2024-02-07 RX ORDER — EPHEDRINE SULFATE 50 MG/ML
INJECTION INTRAVENOUS AS NEEDED
Status: DISCONTINUED | OUTPATIENT
Start: 2024-02-07 | End: 2024-02-08

## 2024-02-07 RX ORDER — MAGNESIUM HYDROXIDE 1200 MG/15ML
LIQUID ORAL AS NEEDED
Status: DISCONTINUED | OUTPATIENT
Start: 2024-02-07 | End: 2024-02-08 | Stop reason: HOSPADM

## 2024-02-07 RX ORDER — METOCLOPRAMIDE HYDROCHLORIDE 5 MG/ML
INJECTION INTRAMUSCULAR; INTRAVENOUS AS NEEDED
Status: DISCONTINUED | OUTPATIENT
Start: 2024-02-07 | End: 2024-02-08

## 2024-02-07 RX ORDER — CALCIUM CHLORIDE 100 MG/ML
INJECTION INTRAVENOUS; INTRAVENTRICULAR AS NEEDED
Status: DISCONTINUED | OUTPATIENT
Start: 2024-02-07 | End: 2024-02-08

## 2024-02-07 RX ORDER — LIDOCAINE HYDROCHLORIDE 20 MG/ML
INJECTION, SOLUTION EPIDURAL; INFILTRATION; INTRACAUDAL; PERINEURAL AS NEEDED
Status: DISCONTINUED | OUTPATIENT
Start: 2024-02-07 | End: 2024-02-08

## 2024-02-07 RX ORDER — FERROUS SULFATE 325(65) MG
325 TABLET ORAL
Status: DISCONTINUED | OUTPATIENT
Start: 2024-02-07 | End: 2024-02-12 | Stop reason: HOSPADM

## 2024-02-07 RX ADMIN — HEPARIN SODIUM 7500 UNITS: 5000 INJECTION INTRAVENOUS; SUBCUTANEOUS at 05:28

## 2024-02-07 RX ADMIN — EPHEDRINE SULFATE 10 MG: 50 INJECTION, SOLUTION INTRAVENOUS at 19:25

## 2024-02-07 RX ADMIN — ROCURONIUM BROMIDE 20 MG: 50 INJECTION, SOLUTION INTRAVENOUS at 18:43

## 2024-02-07 RX ADMIN — ROCURONIUM BROMIDE 20 MG: 50 INJECTION, SOLUTION INTRAVENOUS at 20:15

## 2024-02-07 RX ADMIN — HEPARIN SODIUM 7500 UNITS: 5000 INJECTION INTRAVENOUS; SUBCUTANEOUS at 13:26

## 2024-02-07 RX ADMIN — CALCIUM CHLORIDE 0.5 G: 100 INJECTION INTRAVENOUS; INTRAVENTRICULAR at 19:12

## 2024-02-07 RX ADMIN — SODIUM CHLORIDE, SODIUM LACTATE, POTASSIUM CHLORIDE, AND CALCIUM CHLORIDE 125 ML/HR: .6; .31; .03; .02 INJECTION, SOLUTION INTRAVENOUS at 13:33

## 2024-02-07 RX ADMIN — HYDROMORPHONE HYDROCHLORIDE 0.5 MG: 1 INJECTION, SOLUTION INTRAMUSCULAR; INTRAVENOUS; SUBCUTANEOUS at 18:36

## 2024-02-07 RX ADMIN — FENTANYL CITRATE 100 MCG: 50 INJECTION INTRAMUSCULAR; INTRAVENOUS at 18:10

## 2024-02-07 RX ADMIN — EPHEDRINE SULFATE 5 MG: 50 INJECTION, SOLUTION INTRAVENOUS at 18:19

## 2024-02-07 RX ADMIN — EPHEDRINE SULFATE 10 MG: 50 INJECTION, SOLUTION INTRAVENOUS at 21:43

## 2024-02-07 RX ADMIN — ROCURONIUM BROMIDE 30 MG: 50 INJECTION, SOLUTION INTRAVENOUS at 23:19

## 2024-02-07 RX ADMIN — PHENYLEPHRINE HYDROCHLORIDE 200 MCG: 10 INJECTION INTRAVENOUS at 21:23

## 2024-02-07 RX ADMIN — PHENYLEPHRINE HYDROCHLORIDE 200 MCG: 10 INJECTION INTRAVENOUS at 22:52

## 2024-02-07 RX ADMIN — EPHEDRINE SULFATE 10 MG: 50 INJECTION, SOLUTION INTRAVENOUS at 21:37

## 2024-02-07 RX ADMIN — Medication 3000 MG: at 18:18

## 2024-02-07 RX ADMIN — SODIUM CHLORIDE, SODIUM LACTATE, POTASSIUM CHLORIDE, AND CALCIUM CHLORIDE: .6; .31; .03; .02 INJECTION, SOLUTION INTRAVENOUS at 21:58

## 2024-02-07 RX ADMIN — Medication 3000 MG: at 22:12

## 2024-02-07 RX ADMIN — PHENYLEPHRINE HYDROCHLORIDE 50 MCG: 10 INJECTION INTRAVENOUS at 20:40

## 2024-02-07 RX ADMIN — ONDANSETRON 4 MG: 2 INJECTION INTRAMUSCULAR; INTRAVENOUS at 18:33

## 2024-02-07 RX ADMIN — GLUCAGON 1 MG: KIT at 19:18

## 2024-02-07 RX ADMIN — SODIUM CHLORIDE, SODIUM GLUCONATE, SODIUM ACETATE, POTASSIUM CHLORIDE, MAGNESIUM CHLORIDE, SODIUM PHOSPHATE, DIBASIC, AND POTASSIUM PHOSPHATE 125 ML/HR: .53; .5; .37; .037; .03; .012; .00082 INJECTION, SOLUTION INTRAVENOUS at 09:49

## 2024-02-07 RX ADMIN — PHENYLEPHRINE HYDROCHLORIDE 20 MCG/MIN: 10 INJECTION INTRAVENOUS at 18:26

## 2024-02-07 RX ADMIN — ROCURONIUM BROMIDE 50 MG: 50 INJECTION, SOLUTION INTRAVENOUS at 18:11

## 2024-02-07 RX ADMIN — SODIUM CHLORIDE, SODIUM GLUCONATE, SODIUM ACETATE, POTASSIUM CHLORIDE, MAGNESIUM CHLORIDE, SODIUM PHOSPHATE, DIBASIC, AND POTASSIUM PHOSPHATE 125 ML/HR: .53; .5; .37; .037; .03; .012; .00082 INJECTION, SOLUTION INTRAVENOUS at 01:18

## 2024-02-07 RX ADMIN — DEXAMETHASONE SODIUM PHOSPHATE 10 MG: 10 INJECTION, SOLUTION INTRAMUSCULAR; INTRAVENOUS at 18:33

## 2024-02-07 RX ADMIN — MIDAZOLAM 2 MG: 1 INJECTION INTRAMUSCULAR; INTRAVENOUS at 18:05

## 2024-02-07 RX ADMIN — ALBUMIN (HUMAN): 12.5 INJECTION, SOLUTION INTRAVENOUS at 23:13

## 2024-02-07 RX ADMIN — EPHEDRINE SULFATE 5 MG: 50 INJECTION, SOLUTION INTRAVENOUS at 21:34

## 2024-02-07 RX ADMIN — UMECLIDINIUM 1 PUFF: 62.5 AEROSOL, POWDER ORAL at 11:39

## 2024-02-07 RX ADMIN — CALCIUM CHLORIDE 0.5 G: 100 INJECTION INTRAVENOUS; INTRAVENTRICULAR at 18:48

## 2024-02-07 RX ADMIN — PHENYLEPHRINE HYDROCHLORIDE 400 MCG: 10 INJECTION INTRAVENOUS at 21:31

## 2024-02-07 RX ADMIN — ROCURONIUM BROMIDE 10 MG: 50 INJECTION, SOLUTION INTRAVENOUS at 21:16

## 2024-02-07 RX ADMIN — METOCLOPRAMIDE HYDROCHLORIDE 10 MG: 5 INJECTION INTRAMUSCULAR; INTRAVENOUS at 18:43

## 2024-02-07 RX ADMIN — ROCURONIUM BROMIDE 20 MG: 50 INJECTION, SOLUTION INTRAVENOUS at 22:09

## 2024-02-07 RX ADMIN — METOPROLOL SUCCINATE 50 MG: 50 TABLET, EXTENDED RELEASE ORAL at 09:49

## 2024-02-07 RX ADMIN — BUDESONIDE AND FORMOTEROL FUMARATE DIHYDRATE 2 PUFF: 160; 4.5 AEROSOL RESPIRATORY (INHALATION) at 11:39

## 2024-02-07 RX ADMIN — LIDOCAINE HYDROCHLORIDE 100 MG: 20 INJECTION, SOLUTION EPIDURAL; INFILTRATION; INTRACAUDAL; PERINEURAL at 18:10

## 2024-02-07 RX ADMIN — PROPOFOL 150 MG: 10 INJECTION, EMULSION INTRAVENOUS at 18:10

## 2024-02-07 RX ADMIN — PANTOPRAZOLE SODIUM 40 MG: 40 TABLET, DELAYED RELEASE ORAL at 09:49

## 2024-02-07 NOTE — PLAN OF CARE
Problem: Potential for Falls  Goal: Patient will remain free of falls  Description: INTERVENTIONS:  - Educate patient/family on patient safety including physical limitations  - Instruct patient to call for assistance with activity   - Consult OT/PT to assist with strengthening/mobility   - Keep Call bell within reach  - Keep bed low and locked with side rails adjusted as appropriate  - Keep care items and personal belongings within reach  - Initiate and maintain comfort rounds  - Make Fall Risk Sign visible to staff  - Offer Toileting every  Hours, in advance of need  - Initiate/Maintain alarm  - Obtain necessary fall risk management equipment:   - Apply yellow socks and bracelet for high fall risk patients  - Consider moving patient to room near nurses station  Outcome: Progressing     Problem: PAIN - ADULT  Goal: Verbalizes/displays adequate comfort level or baseline comfort level  Description: Interventions:  - Encourage patient to monitor pain and request assistance  - Assess pain using appropriate pain scale  - Administer analgesics based on type and severity of pain and evaluate response  - Implement non-pharmacological measures as appropriate and evaluate response  - Consider cultural and social influences on pain and pain management  - Notify physician/advanced practitioner if interventions unsuccessful or patient reports new pain  Outcome: Progressing     Problem: DISCHARGE PLANNING  Goal: Discharge to home or other facility with appropriate resources  Description: INTERVENTIONS:  - Identify barriers to discharge w/patient and caregiver  - Arrange for needed discharge resources and transportation as appropriate  - Identify discharge learning needs (meds, wound care, etc.)  - Arrange for interpretive services to assist at discharge as needed  - Refer to Case Management Department for coordinating discharge planning if the patient needs post-hospital services based on physician/advanced practitioner order  or complex needs related to functional status, cognitive ability, or social support system  Outcome: Progressing     Problem: Knowledge Deficit  Goal: Patient/family/caregiver demonstrates understanding of disease process, treatment plan, medications, and discharge instructions  Description: Complete learning assessment and assess knowledge base.  Interventions:  - Provide teaching at level of understanding  - Provide teaching via preferred learning methods  Outcome: Progressing

## 2024-02-07 NOTE — UTILIZATION REVIEW
NOTIFICATION OF INPATIENT ADMISSION   AUTHORIZATION REQUEST   SERVICING FACILITY:   [facility]  Address: [address] [place] [address]  Tax ID: [id number]  NPI: [id number] ATTENDING PROVIDER:  Attending Name and NPI#: Jj Gross Do [[id number]]  Address: [address] [place] [address]  Phone: [phone]   ADMISSION INFORMATION:  Place of Service: Inpatient [facility]  Place of Service Code: 21  Inpatient Admission Date/Time: 2/6/24  8:59 AM  Discharge Date/Time: No discharge date for patient encounter.  Admitting Diagnosis Code/Description:  Abdominal pain [R10.9]     UTILIZATION REVIEW CONTACT:  Bayron Candelaria Utilization   Network Utilization Review Department  Phone: [phone]  Fax: [phone]  Email: [email or web address]  Contact for approvals/pending authorizations, clinical reviews, and discharge.     PHYSICIAN ADVISORY SERVICES:  Medical Necessity Denial & [employer]ydf-[employer] Review  Phone: [phone]  Fax: [phone]  Email: [email or web address]     DISCHARGE SUPPORT TEAM:  For Patients Discharge Needs & Updates  Phone: [phone] opt. 2 Fax: [phone]  Email: [email or web address]

## 2024-02-07 NOTE — PROGRESS NOTES
Progress Note - General Surgery  Mesfin Cano 58 y.o. male MRN: [id number]  Unit/Bed#: [facility] 613-01 Encounter: [id number]      Assessment:  58 y.o. male with PMH of asthma, afib (on Eliquis), CHF (EF 50%), DM, HTN, HLD, RYGB, and ventral hernia repair who presents with pancreatitis (likely 2/2 gallstones), hyperbilirubinemia, and CKD.        Afebrile, HDS    Plan:  - NPO  - Plyte @ 125  - PRN analgesia and antiemetics  - DVT ppx with SQH.  - Plan for OR today for laparoscopic cholecystectomy with IOC  - Protonix  - Encourage OOB and ambulation    Subjective: No acute events overnight. Afebrile, hemodynamically stable. Tolerating diet yesterday. No nausea, or vomiting, fevers or chills.         Objective:   Temp:  [97.4 °F (36.3 °C)-97.7 °F (36.5 °C)] 97.4 °F (36.3 °C)  HR:  [71-80] 80  Resp:  [17-18] 18  BP: (115-121)/(61-68) 115/68    Physical Exam:  General: No acute distress, alert and oriented  CV: Well perfused, regular rate and rhythm  Lungs: Normal work of breathing, no increased respiratory effort  Abdomen: Soft, mildly tender in the RUQ. Non-distended. No rebound or guarding.  Extremities: No edema, clubbing or cyanosis  Skin: Warm, dry      I/O         02/05 0701  02/06 0700 02/06 0701  02/07 0700 02/07 0701  02/08 0700    P.O. 640 1160 0    I.V. (mL/kg)  1000 (6.6)     Total Intake(mL/kg) 640 (4.2) 2160 (14.2) 0 (0)    Urine (mL/kg/hr) 1875 (0.5) 1100 (0.3)     Total Output 1875 1100     Net -1235 +1060 0           Unmeasured Urine Occurrence  1 x             Lab, Imaging and other studies: I have personally reviewed pertinent reports.  , CBC with diff:   Lab Results   Component Value Date    WBC 4.16 (L) 02/07/2024    HGB 8.8 (L) 02/07/2024    HCT 29.0 (L) 02/07/2024    MCV 85 02/07/2024     02/07/2024    RBC 3.41 (L) 02/07/2024    MCH 25.8 (L) 02/07/2024    MCHC 30.3 (L) 02/07/2024    RDW 16.7 (H) 02/07/2024    MPV 10.6 02/07/2024    NRBC 0 02/07/2024   , BMP/CMP:   Lab Results   Component Value  Date    SODIUM 137 02/07/2024    K 3.7 02/07/2024     02/07/2024    CO2 28 02/07/2024    BUN 20 02/07/2024    CREATININE 1.65 (H) 02/07/2024    CALCIUM 8.2 (L) 02/07/2024    AST 33 02/07/2024    ALT 47 02/07/2024    ALKPHOS 344 (H) 02/07/2024    EGFR 45 02/07/2024         Paulino Brown MD  General Surgery   02/07/24

## 2024-02-07 NOTE — ANESTHESIA PREPROCEDURE EVALUATION
Procedure:  CHOLECYSTECTOMY LAPAROSCOPIC (Abdomen)  LAPAROSCOPIC TRANSGASTRIC ACCESS FOR ERCP (Abdomen)    Relevant Problems   CARDIO   (+) Acute on chronic heart failure with preserved ejection fraction (HFpEF) (HCC)   (+) Chronic diastolic CHF (HCC)   (+) Hyperlipidemia   (+) Paroxysmal atrial fibrillation (HCC)   (+) Primary hypertension      ENDO   (+) Type 2 diabetes mellitus with hyperglycemia, without long-term current use of insulin (HCC)      GI/HEPATIC   (+) Acute gallstone pancreatitis      /RENAL   (+) CKD (chronic kidney disease) stage 3 (HCC)      HEMATOLOGY   (+) Anemia due to chronic kidney disease      MUSCULOSKELETAL   (+) Primary osteoarthritis of both knees   (+) Primary osteoarthritis of right knee      NEURO/PSYCH   (+) Diabetic neuropathy (HCC)   (+) Diabetic polyneuropathy associated with type 2 diabetes mellitus (HCC)      PULMONARY   (+) VICKY (obstructive sleep apnea)   (+) Severe persistent asthma      Digestive   (+) Irritable bowel syndrome with diarrhea      Musculoskeletal and Integument   (+) Foraminal stenosis of lumbar region   (+) HNP (herniated nucleus pulposus), lumbar      Other   (+) Eosinophilia   (+) Hyponatremia   (+) Loose stools   (+) Morbid obesity (HCC)   (+) Nasal congestion   (+) Presence of CardioMEMS HF system      Lab Results   Component Value Date    SODIUM 137 02/07/2024    K 3.7 02/07/2024     02/07/2024    CO2 28 02/07/2024    AGAP 9 02/07/2024    BUN 20 02/07/2024    CREATININE 1.65 (H) 02/07/2024    GLUC 121 02/07/2024    GLUF 122 (H) 02/05/2024    CALCIUM 8.2 (L) 02/07/2024    AST 33 02/07/2024    ALT 47 02/07/2024    ALKPHOS 344 (H) 02/07/2024    TP 5.4 (L) 02/07/2024    TBILI 0.99 02/07/2024    EGFR 45 02/07/2024     Lab Results   Component Value Date    WBC 4.16 (L) 02/07/2024    HGB 8.8 (L) 02/07/2024    HCT 29.0 (L) 02/07/2024    MCV 85 02/07/2024     02/07/2024 4/11/23 TTE  Left Ventricle Left ventricular cavity size is mildly dilated.  Wall thickness is upper normal. The left ventricular ejection fraction is 50%. Systolic function is low normal.  Wall motion is normal. Diastolic function is moderately abnormal, consistent with grade II (pseudonormal) relaxation.   Right Ventricle Right ventricular cavity size is normal. Systolic function is normal. Wall thickness is normal.   Left Atrium The atrium is mildly dilated.   Right Atrium The atrium is normal in size.   Aortic Valve The aortic valve is trileaflet. The leaflets are not thickened. The leaflets are not calcified. The leaflets exhibit normal mobility. There is no evidence of regurgitation. The aortic valve has no significant stenosis.   Mitral Valve Mitral valve structure is normal. There is trace regurgitation. There is no evidence of stenosis.   Tricuspid Valve Tricuspid valve structure is normal. There is trace regurgitation. There is no evidence of stenosis.   Pulmonic Valve Pulmonic valve structure is normal. There is trace regurgitation. There is no evidence of stenosis.   Ascending Aorta The aortic root is normal in size.   IVC/SVC The inferior vena cava is normal in size.   Pericardium There is no pericardial effusion. The pericardium is normal in appearance.     10/11/23 RHC    -140 during case  HR 74  O2 sat 97%  Hgb 10.5     RA 8  RV 30/8  PA 28/12, 17  PCWP 10-12 (multiple checks)  TPG 5  PA sat 59% (multiple checks)  Travis CO/CI: 6.6/2.3  TD CO/CI: 7.43/2.78     Simultaneous CardioMEMS data:  Mean: 26/10/15  End expiration on MEMS waveform: 28/10      Impression/Recommendations:  The patient tolerated the procedure well; no immediate complications.  Normal biv filling pressures (after inpt diuresis).  Normal CO/CI.  CardioMEMS data correlates to RHC.         Anesthesia Plan  ASA Score- 3     Anesthesia Type- general with ASA Monitors.         Additional Monitors:     Airway Plan: ETT.           Plan Factors-Exercise tolerance (METS): >4 METS.    Chart reviewed. EKG  reviewed. Imaging results reviewed. Existing labs reviewed. Patient summary reviewed.                  Induction- intravenous.    Postoperative Plan- Plan for postoperative opioid use. Planned trial extubation    Informed Consent- Anesthetic plan and risks discussed with patient.  I personally reviewed this patient with the CRNA. Discussed and agreed on the Anesthesia Plan with the CRNA..

## 2024-02-07 NOTE — MALNUTRITION/BMI
This medical record reflects one or more clinical indicators suggestive of morbid obesity.        BMI Findings:  Adult BMI Classifications: Morbid Obesity 40-44.9        Body mass index is 44.33 kg/m².     See Nutrition note dated 2/7/24 for additional details.  Completed nutrition assessment is viewable in the nutrition documentation.

## 2024-02-08 LAB
ABO GROUP BLD BPU: NORMAL
ABO GROUP BLD BPU: NORMAL
ALBUMIN SERPL BCP-MCNC: 3.2 G/DL (ref 3.5–5)
ALBUMIN SERPL BCP-MCNC: 3.3 G/DL (ref 3.5–5)
ALP SERPL-CCNC: 413 U/L (ref 34–104)
ALP SERPL-CCNC: 430 U/L (ref 34–104)
ALT SERPL W P-5'-P-CCNC: 83 U/L (ref 7–52)
ALT SERPL W P-5'-P-CCNC: 88 U/L (ref 7–52)
ANION GAP SERPL CALCULATED.3IONS-SCNC: 11 MMOL/L
ANION GAP SERPL CALCULATED.3IONS-SCNC: 15 MMOL/L
AST SERPL W P-5'-P-CCNC: 181 U/L (ref 13–39)
AST SERPL W P-5'-P-CCNC: 183 U/L (ref 13–39)
BASE EXCESS BLDA CALC-SCNC: -4 MMOL/L
BASE EXCESS BLDA CALC-SCNC: -4 MMOL/L (ref -2–3)
BASE EXCESS BLDA CALC-SCNC: -6 MMOL/L (ref -2–3)
BASOPHILS # BLD MANUAL: 0.12 THOUSAND/UL (ref 0–0.1)
BASOPHILS NFR MAR MANUAL: 1 % (ref 0–1)
BILIRUB SERPL-MCNC: 2.55 MG/DL (ref 0.2–1)
BILIRUB SERPL-MCNC: 2.77 MG/DL (ref 0.2–1)
BPU ID: NORMAL
BPU ID: NORMAL
BUN SERPL-MCNC: 19 MG/DL (ref 5–25)
BUN SERPL-MCNC: 19 MG/DL (ref 5–25)
BURR CELLS BLD QL SMEAR: PRESENT
CA-I BLD-SCNC: 1.12 MMOL/L (ref 1.12–1.32)
CA-I BLD-SCNC: 1.17 MMOL/L (ref 1.12–1.32)
CA-I BLD-SCNC: 1.18 MMOL/L (ref 1.12–1.32)
CALCIUM ALBUM COR SERPL-MCNC: 8.9 MG/DL (ref 8.3–10.1)
CALCIUM ALBUM COR SERPL-MCNC: 9.5 MG/DL (ref 8.3–10.1)
CALCIUM SERPL-MCNC: 8.3 MG/DL (ref 8.4–10.2)
CALCIUM SERPL-MCNC: 8.9 MG/DL (ref 8.4–10.2)
CHLORIDE SERPL-SCNC: 101 MMOL/L (ref 96–108)
CHLORIDE SERPL-SCNC: 102 MMOL/L (ref 96–108)
CO2 SERPL-SCNC: 19 MMOL/L (ref 21–32)
CO2 SERPL-SCNC: 21 MMOL/L (ref 21–32)
CREAT SERPL-MCNC: 1.7 MG/DL (ref 0.6–1.3)
CREAT SERPL-MCNC: 1.7 MG/DL (ref 0.6–1.3)
CROSSMATCH: NORMAL
CROSSMATCH: NORMAL
EOSINOPHIL # BLD MANUAL: 0 THOUSAND/UL (ref 0–0.4)
EOSINOPHIL NFR BLD MANUAL: 0 % (ref 0–6)
ERYTHROCYTE [DISTWIDTH] IN BLOOD BY AUTOMATED COUNT: 16.5 % (ref 11.6–15.1)
ERYTHROCYTE [DISTWIDTH] IN BLOOD BY AUTOMATED COUNT: 16.6 % (ref 11.6–15.1)
GFR SERPL CREATININE-BSD FRML MDRD: 44 ML/MIN/1.73SQ M
GFR SERPL CREATININE-BSD FRML MDRD: 44 ML/MIN/1.73SQ M
GLUCOSE SERPL-MCNC: 191 MG/DL (ref 65–140)
GLUCOSE SERPL-MCNC: 236 MG/DL (ref 65–140)
GLUCOSE SERPL-MCNC: 239 MG/DL (ref 65–140)
GLUCOSE SERPL-MCNC: 247 MG/DL (ref 65–140)
GLUCOSE SERPL-MCNC: 260 MG/DL (ref 65–140)
GLUCOSE SERPL-MCNC: 260 MG/DL (ref 65–140)
GLUCOSE SERPL-MCNC: 261 MG/DL (ref 65–140)
GLUCOSE SERPL-MCNC: 264 MG/DL (ref 65–140)
GLUCOSE SERPL-MCNC: 278 MG/DL (ref 65–140)
GLUCOSE SERPL-MCNC: 299 MG/DL (ref 65–140)
HCO3 BLDA-SCNC: 20.7 MMOL/L (ref 22–28)
HCO3 BLDA-SCNC: 20.9 MMOL/L (ref 22–28)
HCO3 BLDA-SCNC: 22.2 MMOL/L (ref 22–28)
HCT VFR BLD AUTO: 28.4 % (ref 36.5–49.3)
HCT VFR BLD AUTO: 30.9 % (ref 36.5–49.3)
HCT VFR BLD CALC: 27 % (ref 36.5–49.3)
HCT VFR BLD CALC: 27 % (ref 36.5–49.3)
HCT VFR BLD CALC: 30 % (ref 36.5–49.3)
HGB BLD-MCNC: 8.7 G/DL (ref 12–17)
HGB BLD-MCNC: 9.7 G/DL (ref 12–17)
HGB BLDA-MCNC: 10.2 G/DL (ref 12–17)
HGB BLDA-MCNC: 9.2 G/DL (ref 12–17)
HGB BLDA-MCNC: 9.2 G/DL (ref 12–17)
LYMPHOCYTES # BLD AUTO: 0 % (ref 14–44)
LYMPHOCYTES # BLD AUTO: 0 THOUSAND/UL (ref 0.6–4.47)
MCH RBC QN AUTO: 26.1 PG (ref 26.8–34.3)
MCH RBC QN AUTO: 26.9 PG (ref 26.8–34.3)
MCHC RBC AUTO-ENTMCNC: 30.6 G/DL (ref 31.4–37.4)
MCHC RBC AUTO-ENTMCNC: 31.4 G/DL (ref 31.4–37.4)
MCV RBC AUTO: 85 FL (ref 82–98)
MCV RBC AUTO: 86 FL (ref 82–98)
MONOCYTES # BLD AUTO: 0.12 THOUSAND/UL (ref 0–1.22)
MONOCYTES NFR BLD: 1 % (ref 4–12)
NASAL CANNULA: 3
NEUTROPHILS # BLD MANUAL: 12.24 THOUSAND/UL (ref 1.85–7.62)
NEUTS BAND NFR BLD MANUAL: 1 % (ref 0–8)
NEUTS SEG NFR BLD AUTO: 97 % (ref 43–75)
O2 CT BLDA-SCNC: 13.9 ML/DL (ref 16–23)
OXYHGB MFR BLDA: 96 % (ref 94–97)
PCO2 BLD: 22 MMOL/L (ref 21–32)
PCO2 BLD: 23 MMOL/L (ref 21–32)
PCO2 BLD: 42.5 MM HG (ref 36–44)
PCO2 BLD: 43.8 MM HG (ref 36–44)
PCO2 BLDA: 37.4 MM HG (ref 36–44)
PH BLD: 7.28 [PH] (ref 7.35–7.45)
PH BLD: 7.33 [PH] (ref 7.35–7.45)
PH BLDA: 7.37 [PH] (ref 7.35–7.45)
PLATELET # BLD AUTO: 215 THOUSANDS/UL (ref 149–390)
PLATELET # BLD AUTO: 259 THOUSANDS/UL (ref 149–390)
PLATELET BLD QL SMEAR: ADEQUATE
PMV BLD AUTO: 10.1 FL (ref 8.9–12.7)
PMV BLD AUTO: 10.5 FL (ref 8.9–12.7)
PO2 BLD: 156 MM HG (ref 75–129)
PO2 BLD: 156 MM HG (ref 75–129)
PO2 BLD: 169 MM HG (ref 75–129)
PO2 BLDA: 96.3 MM HG (ref 75–129)
POIKILOCYTOSIS BLD QL SMEAR: PRESENT
POTASSIUM BLD-SCNC: 4 MMOL/L (ref 3.5–5.3)
POTASSIUM BLD-SCNC: 4 MMOL/L (ref 3.5–5.3)
POTASSIUM BLD-SCNC: 4.1 MMOL/L (ref 3.5–5.3)
POTASSIUM SERPL-SCNC: 4.2 MMOL/L (ref 3.5–5.3)
POTASSIUM SERPL-SCNC: 4.4 MMOL/L (ref 3.5–5.3)
PROT SERPL-MCNC: 6 G/DL (ref 6.4–8.4)
PROT SERPL-MCNC: 6 G/DL (ref 6.4–8.4)
RBC # BLD AUTO: 3.33 MILLION/UL (ref 3.88–5.62)
RBC # BLD AUTO: 3.6 MILLION/UL (ref 3.88–5.62)
RBC MORPH BLD: PRESENT
SAO2 % BLD FROM PO2: 99 % (ref 60–85)
SAO2 % BLD FROM PO2: 99 % (ref 60–85)
SODIUM BLD-SCNC: 136 MMOL/L (ref 136–145)
SODIUM BLD-SCNC: 137 MMOL/L (ref 136–145)
SODIUM BLD-SCNC: 137 MMOL/L (ref 136–145)
SODIUM SERPL-SCNC: 133 MMOL/L (ref 135–147)
SODIUM SERPL-SCNC: 136 MMOL/L (ref 135–147)
SPECIMEN SOURCE: ABNORMAL
UNIT DISPENSE STATUS: NORMAL
UNIT DISPENSE STATUS: NORMAL
UNIT PRODUCT CODE: NORMAL
UNIT PRODUCT CODE: NORMAL
UNIT PRODUCT VOLUME: 300 ML
UNIT PRODUCT VOLUME: 350 ML
UNIT RH: NORMAL
UNIT RH: NORMAL
WBC # BLD AUTO: 12.49 THOUSAND/UL (ref 4.31–10.16)
WBC # BLD AUTO: 13.04 THOUSAND/UL (ref 4.31–10.16)

## 2024-02-08 PROCEDURE — 82805 BLOOD GASES W/O2 SATURATION: CPT | Performed by: STUDENT IN AN ORGANIZED HEALTH CARE EDUCATION/TRAINING PROGRAM

## 2024-02-08 PROCEDURE — 85027 COMPLETE CBC AUTOMATED: CPT | Performed by: STUDENT IN AN ORGANIZED HEALTH CARE EDUCATION/TRAINING PROGRAM

## 2024-02-08 PROCEDURE — 85007 BL SMEAR W/DIFF WBC COUNT: CPT | Performed by: STUDENT IN AN ORGANIZED HEALTH CARE EDUCATION/TRAINING PROGRAM

## 2024-02-08 PROCEDURE — 99232 SBSQ HOSP IP/OBS MODERATE 35: CPT | Performed by: INTERNAL MEDICINE

## 2024-02-08 PROCEDURE — 99024 POSTOP FOLLOW-UP VISIT: CPT | Performed by: SURGERY

## 2024-02-08 PROCEDURE — 82948 REAGENT STRIP/BLOOD GLUCOSE: CPT

## 2024-02-08 PROCEDURE — 80053 COMPREHEN METABOLIC PANEL: CPT | Performed by: STUDENT IN AN ORGANIZED HEALTH CARE EDUCATION/TRAINING PROGRAM

## 2024-02-08 RX ORDER — METRONIDAZOLE 500 MG/100ML
500 INJECTION, SOLUTION INTRAVENOUS EVERY 8 HOURS
Status: DISCONTINUED | OUTPATIENT
Start: 2024-02-08 | End: 2024-02-11

## 2024-02-08 RX ORDER — CEFAZOLIN SODIUM 2 G/50ML
2000 SOLUTION INTRAVENOUS EVERY 8 HOURS
Status: DISCONTINUED | OUTPATIENT
Start: 2024-02-08 | End: 2024-02-11

## 2024-02-08 RX ORDER — FENTANYL CITRATE/PF 50 MCG/ML
25 SYRINGE (ML) INJECTION
Status: DISCONTINUED | OUTPATIENT
Start: 2024-02-08 | End: 2024-02-08

## 2024-02-08 RX ADMIN — CEFAZOLIN SODIUM 2000 MG: 2 SOLUTION INTRAVENOUS at 16:05

## 2024-02-08 RX ADMIN — FENTANYL CITRATE 25 MCG: 50 INJECTION INTRAMUSCULAR; INTRAVENOUS at 01:07

## 2024-02-08 RX ADMIN — PREGABALIN 50 MG: 50 CAPSULE ORAL at 21:04

## 2024-02-08 RX ADMIN — ALBUTEROL SULFATE 2 PUFF: 90 AEROSOL, METERED RESPIRATORY (INHALATION) at 15:04

## 2024-02-08 RX ADMIN — INSULIN LISPRO 4 UNITS: 100 INJECTION, SOLUTION INTRAVENOUS; SUBCUTANEOUS at 10:30

## 2024-02-08 RX ADMIN — ATORVASTATIN CALCIUM 10 MG: 10 TABLET, FILM COATED ORAL at 08:15

## 2024-02-08 RX ADMIN — CEFAZOLIN SODIUM 2000 MG: 2 SOLUTION INTRAVENOUS at 08:16

## 2024-02-08 RX ADMIN — ACETAMINOPHEN 650 MG: 325 TABLET, FILM COATED ORAL at 21:04

## 2024-02-08 RX ADMIN — BUDESONIDE AND FORMOTEROL FUMARATE DIHYDRATE 2 PUFF: 160; 4.5 AEROSOL RESPIRATORY (INHALATION) at 18:10

## 2024-02-08 RX ADMIN — FENTANYL CITRATE 25 MCG: 50 INJECTION INTRAMUSCULAR; INTRAVENOUS at 01:37

## 2024-02-08 RX ADMIN — CEFAZOLIN SODIUM 2000 MG: 2 SOLUTION INTRAVENOUS at 02:11

## 2024-02-08 RX ADMIN — LORATADINE 10 MG: 10 TABLET ORAL at 08:15

## 2024-02-08 RX ADMIN — FENTANYL CITRATE 25 MCG: 50 INJECTION INTRAMUSCULAR; INTRAVENOUS at 01:17

## 2024-02-08 RX ADMIN — INSULIN HUMAN 6 UNITS: 100 INJECTION, SOLUTION PARENTERAL at 01:36

## 2024-02-08 RX ADMIN — INSULIN LISPRO 2 UNITS: 100 INJECTION, SOLUTION INTRAVENOUS; SUBCUTANEOUS at 21:05

## 2024-02-08 RX ADMIN — HEPARIN SODIUM 7500 UNITS: 5000 INJECTION INTRAVENOUS; SUBCUTANEOUS at 21:08

## 2024-02-08 RX ADMIN — METRONIDAZOLE 500 MG: 500 INJECTION, SOLUTION INTRAVENOUS at 02:19

## 2024-02-08 RX ADMIN — HEPARIN SODIUM 7500 UNITS: 5000 INJECTION INTRAVENOUS; SUBCUTANEOUS at 16:02

## 2024-02-08 RX ADMIN — INSULIN LISPRO 6 UNITS: 100 INJECTION, SOLUTION INTRAVENOUS; SUBCUTANEOUS at 11:32

## 2024-02-08 RX ADMIN — FENTANYL CITRATE 25 MCG: 50 INJECTION INTRAMUSCULAR; INTRAVENOUS at 01:11

## 2024-02-08 RX ADMIN — FERROUS SULFATE TAB 325 MG (65 MG ELEMENTAL FE) 325 MG: 325 (65 FE) TAB at 08:15

## 2024-02-08 RX ADMIN — INSULIN LISPRO 4 UNITS: 100 INJECTION, SOLUTION INTRAVENOUS; SUBCUTANEOUS at 16:57

## 2024-02-08 RX ADMIN — OXYCODONE HYDROCHLORIDE 10 MG: 10 TABLET ORAL at 04:23

## 2024-02-08 RX ADMIN — METRONIDAZOLE 500 MG: 500 INJECTION, SOLUTION INTRAVENOUS at 16:05

## 2024-02-08 RX ADMIN — PANTOPRAZOLE SODIUM 40 MG: 40 TABLET, DELAYED RELEASE ORAL at 08:15

## 2024-02-08 RX ADMIN — SODIUM CHLORIDE, SODIUM LACTATE, POTASSIUM CHLORIDE, AND CALCIUM CHLORIDE: .6; .31; .03; .02 INJECTION, SOLUTION INTRAVENOUS at 00:10

## 2024-02-08 RX ADMIN — METRONIDAZOLE 500 MG: 500 INJECTION, SOLUTION INTRAVENOUS at 08:18

## 2024-02-08 RX ADMIN — HEPARIN SODIUM 7500 UNITS: 5000 INJECTION INTRAVENOUS; SUBCUTANEOUS at 04:26

## 2024-02-08 RX ADMIN — SUGAMMADEX 100 MG: 100 INJECTION, SOLUTION INTRAVENOUS at 00:09

## 2024-02-08 RX ADMIN — METOPROLOL SUCCINATE 50 MG: 50 TABLET, EXTENDED RELEASE ORAL at 08:15

## 2024-02-08 RX ADMIN — SUGAMMADEX 100 MG: 100 INJECTION, SOLUTION INTRAVENOUS at 00:23

## 2024-02-08 NOTE — ANESTHESIA PROCEDURE NOTES
Arterial Line Insertion    Performed by: Addie Jimenez CRNA  Authorized by: Channing Cannon MD  Preparation: Patient was prepped and draped in the usual sterile fashion.  Indications: hemodynamic monitoring  Orientation:  Right  Location: radial artery  Sedation:  Patient sedated: yes (geta)    Procedure Details:  Needle gauge: 20  Seldinger technique: Seldinger technique used  Number of attempts: 1    Post-procedure:  Post-procedure: dressing applied  Waveform: good waveform  Post-procedure CNS: normal  Patient tolerance: Patient tolerated the procedure well with no immediate complications

## 2024-02-08 NOTE — ANESTHESIA POSTPROCEDURE EVALUATION
Post-Op Assessment Note    CV Status:  Stable  Pain Score: 0    Pain management: adequate       Mental Status:  Alert and awake   Hydration Status:  Euvolemic   PONV Controlled:  Controlled   Airway Patency:  Patent     Post Op Vitals Reviewed: Yes    No anethesia notable event occurred.    Staff: CRNA               BP   98/49   Temp   97.3   Pulse  75   Resp   15   SpO2   99

## 2024-02-08 NOTE — PROGRESS NOTES
Progress Note -General surgery  : General surgery Resident on [facility]     Mesfin Cano 58 y.o. male MRN: [id number]  Unit/Bed#: [facility] 521-01 Encounter: [id number]    Assessment:  58 y.o. male with gallstone pancreatitis, now status post laparoscopic IOC, lap assisted ERCP, open cholecystectomy on 2/8, recovering well    Vitals signs within normal limits and stable on 3 L nasal cannula    Urine output postoperatively 650+1 unmeasured  Right upper quadrant CHONG drain 30 cc serosanguineous    WBC 13.04 from 12.5  Hemoglobin 8.7 from 9.7  Creatinine 1.7 from 1.7  T. bili 2.77 from 2.55    Plan:  Advance to low-fat diet today  Discontinue IV fluids  Discontinue A-line  Downgrade to stepdown to  Continue antibiotics for 5 days  Pain control as needed  Antiemetic as needed  Encourage ambulation/out of bed, 3 times daily  PT/OT  Encourage incentive spirometer use, 10 times per hour  Anticoagulation Plan-[facility]  Infection Plan -IV Ancef/Flagyl  Disposition Plan -pending clinical improvement    Subjective/Objective     Subjective: Patient seen and examined at bedside.  Patient reports 4 out of 10 pain which is improved from yesterday.  Patient denies nausea vomiting fevers chills and shortness of breath at this time.  Patient reports belching.  Patient denies bowel function at this time.  Patient is tolerating a clear liquid diet.       Objective:     Physical Exam:  GEN: NAD, resting comfortably  HEENT: MMM  CV: Well-perfused regular rate  Lung: Normal effort on 3 L nasal cannula  Ab: Soft, nondistended, appropriate right upper quadrant incisional tenderness  Extrem: No lower extremity edema bilaterally  Neuro: A+Ox3     Vitals: Temp:  [97.3 °F (36.3 °C)-98.1 °F (36.7 °C)] 98.1 °F (36.7 °C)  HR:  [72-86] 85  Resp:  [16-24] 24  BP: ()/(49-68) 124/51  Arterial Line BP: ()/(42-53) 110/42  Body mass index is 46.6 kg/m².    I/O         02/06 0701  02/07 0700 02/07 0701  02/08 0700 02/08 0701 02/09 0700     P.O. 1160 480 480    I.V. (mL/kg) 1000 (6.6) 2991.7 (18.7)     NG/GT  0     IV Piggyback  500     Total Intake(mL/kg) 2160 (14.2) 3971.7 (24.8) 480 (3)    Urine (mL/kg/hr) 1100 (0.3) 650 (0.2)     Drains  30 25    Blood  650     Total Output 1100 1330 25    Net +1060 +2641.7 +455           Unmeasured Urine Occurrence 1 x 1 x               Lab, Imaging and other studies: I have personally reviewed pertinent reports.  , CBC with diff:   Lab Results   Component Value Date    WBC 13.04 (H) 02/08/2024    HGB 8.7 (L) 02/08/2024    HCT 28.4 (L) 02/08/2024    MCV 85 02/08/2024     02/08/2024    RBC 3.33 (L) 02/08/2024    MCH 26.1 (L) 02/08/2024    MCHC 30.6 (L) 02/08/2024    RDW 16.5 (H) 02/08/2024    MPV 10.1 02/08/2024   , BMP/CMP:   Lab Results   Component Value Date    SODIUM 133 (L) 02/08/2024    K 4.4 02/08/2024     02/08/2024    CO2 21 02/08/2024    CO2 22 02/07/2024    BUN 19 02/08/2024    CREATININE 1.70 (H) 02/08/2024    GLUCOSE 260 (H) 02/07/2024    CALCIUM 8.3 (L) 02/08/2024     (H) 02/08/2024    ALT 83 (H) 02/08/2024    ALKPHOS 430 (H) 02/08/2024    EGFR 44 02/08/2024     VTE Pharmacologic Prophylaxis: Heparin  VTE Mechanical Prophylaxis: sequential compression device    Grovre Salgado MD  2/8/2024 9:45 AM

## 2024-02-08 NOTE — RESTORATIVE TECHNICIAN NOTE
Restorative Technician Note      Patient Name: Mesfin Cano     Note Type: Mobility  Patient Position Upon Consult: Bedside chair  Activity Performed: Ambulated  Assistive Device: Roller walker  Patient Position at End of Consult: Bedside chair; All needs within reach

## 2024-02-08 NOTE — QUICK NOTE
"Post-op check - General Surgery  Mesfin Cano 58 y.o. male MRN: [id number]  Unit/Bed#: [facility] 521-01 Encounter: [id number]    Assessment:  58 y.o. male now 1 Day Post-Op s/p Procedure(s) (LRB):  CHOLECYSTECTOMY LAPAROSCOPIC (N/A)  LAPAROSCOPIC TRANSGASTRIC ACCESS FOR ERCP (N/A)  ERCP, sphincterotomy, stone extraction (N/A)    CHONG drain with sanguinous output    Plan:  - Diet Surgical; Diabetic CL Liquids  - Pain and Nausea control PRN  - Incentive spirometry, ten times per hour   - OOB, ambulate, three times per day  - Maintain CHONG drain, monitor output  - Continue abx    Subjective/Objective     Subjective: Went to evaluate the patient after arrival to the floor. The patient's pain is well-controlled and the patient denies any nausea, vomiting, fevers, chills or SOB. Patient is using his IS.     Objective:   Vitals: Blood pressure 126/59, pulse 82, temperature 98 °F (36.7 °C), temperature source Axillary, resp. rate 16, height 6' 1\" (1.854 m), weight (!) 152 kg (336 lb), SpO2 100%.,Body mass index is 44.33 kg/m².    I/O         02/06 0701  02/07 0700 02/07 0701  02/08 0700    P.O. 1160 0    I.V. (mL/kg) 1000 (6.6) 2458.3 (16.2)    IV Piggyback  500    Total Intake(mL/kg) 2160 (14.2) 2958.3 (19.5)    Urine (mL/kg/hr) 1100 (0.3) 250 (0.1)    Blood  650    Total Output 1100 900    Net +1060 +2058.3          Unmeasured Urine Occurrence 1 x 1 x            Physical Exam:  General: No acute distress  Neuro: alert and oriented  HEENT: moist mucous membranes  CV: Well perfused, regular rate and rhythm  Lungs: Normal work of breathing, no increased respiratory effort on 3L NC  Abdomen: Soft, RUQ tenderness, non-distended. Laparoscopy and open cholecystectomy incisions clean, dry and intact.  Extremities: No edema, clubbing or cyanosis  Skin: Warm, dry      Lab, Imaging and other studies: I have personally reviewed pertinent reports.    VTE Pharmacologic Prophylaxis: Sequential compression device (Venodyne)   VTE Mechanical " Prophylaxis: sequential compression device

## 2024-02-08 NOTE — OP NOTE
OPERATIVE REPORT  PATIENT NAME: Mesfin Cano    :  1965  MRN: [id number]  Pt Location: [facility] OR ROOM 03    SURGERY DATE: 2024    Surgeons and Role:  Panel 1:     * Nena Ferguson MD - Primary     * Herminia Blair DO - Assisting     * Musa Stone MD - Assisting  Panel 2:     * Rey Ferguson MD - Primary    Preop Diagnosis:  Acute gallstone pancreatitis [K85.10]    Post-Op Diagnosis Codes:     * Acute gallstone pancreatitis [K85.10]    Procedure(s):  Laparoscopic intraoperative cholangiogram, Laparoscopic transgastric access for endoscopic retrograde cholangiopancreatography with sphincterotomy and stone extraction, Converted to open cholecystectomy and wedge gastrectomy        Specimen(s):  ID Type Source Tests Collected by Time Destination   1 : Gallbladder Tissue Gallbladder TISSUE EXAM Nena Ferguson MD 2024 [id number]        Estimated Blood Loss:   650 mL    Drains:  Closed/Suction Drain RUQ Bulb 15 Fr. (Active)   Number of days: 0       Anesthesia Type:   General    Operative Indications:  Acute gallstone pancreatitis [K85.10]      Operative Findings:  Extensive visceral adiposity, intrahepatic gallbladder. IOC with lack of contrast flow into duodenum consistent with obstruction. Laparoscopic assisted ERCP performed with removal of stones and sludge + sphincterotomy. Stapled wedge gastrectomy of gastric remnant performed unsuccessfully, therefore converted to open procedure for gastric repair and completion cholecystectomy.     Complications:   None    Procedure and Technique:  The patient was brought to the operating room and identified verbally and via wristband. He was transferred to the operating room table and positioned supine. General endotracheal anesthesia was induced successfully. The patient's abdomen was prepped and draped in the usual sterile fashion. Pre-operative antibiotics were administered. A time out was performed confirming the patient, procedure, and site.  All  parties were in agreement.    Attention was turned to the abdomen where a 12 mm supraumbilical curvilinear incision was made with an 11 blade scalpel. Dissection was deepened bluntly through the subcutaneous tissue with two S retractors. The fascia was grasped and elevated. Given the extensive amount of subcutaneous tissue, Troy technique to enter the abdomen was unsuccessful. The the Veress needle was placed in the LUQ where there were no scars present. CO2 insufflation was initiated, which the patient tolerated well. A 12 mm trocar was placed through the supraumbilical incision. The laparoscope was inserted and the surrounding intra-abdominal contents were inspected for injury upon entry, and no injuries were appreciated. The patient was positioned appropriately. A 5 mm epigastric trocar, along with two 5 mm RUQ trocars were placed under laparoscopic visualization. The gallbladder was visualized and was covered with a thick fatty peritoneal lining. This was removed with a combination of blunt dissection and hook electrocautery. The gallbladder was grasped and retracted cephalad above the liver. The infundibulum was grasped with a separate grasper and retracted laterally. The cystic duct and cystic artery were circumferentially dissected with a [place] grasper until two tubular structures were visualized entering the gallbladder and the cystic plate was present in the background.     Once our anatomy was clearly defined with our critical view of safety, the Palma clamp was inserted through the most lateral port and used to grasp the gallbladder at the neck. A stone was felt in this location, which was milked into the gallbladder with a [place] grasper. The intraoperative cholangiogram catheter was placed through the Palma clamp and inserted into the cystic duct. Bilious fluid was visualized back flowing through the catheter. The catheter was advanced to the CBD confluence and fluoroscopy was set up. Contrast  was administered through the catheter and the cholangiogram was performed. This revealed a sharp tapering of the CBD just prior to the duodenum and no contrast flow into the duodenum. This was concerning for choledocholithiasis. The catheter was then flushed with normal saline. 1 mg glucagon was administered. An additional cholangiogram was then performed and there was still no contrast flowing into the duodenum. GI was then called, who mobilized their team to perform a laparoscopic assisted endoscopic retrograde cholangiopancreatography. Both the cystic duct and cystic artery were clipped x 3 and incised with laparoscopic scissors so that two clips remained in the abdomen. An endoloop was placed at the cut end of the cystic duct as well as on the gallbladder side.     Attention was turned to the gastric remnant which was grasped with an atraumatic grasper and elevated towards the anterior abdominal wall. A separate 15 mm incision was made at this location with an 11 blade scalpel. A 15 mm trocar was placed through this incision. The laparoscopic endostitch was used to place two stitches on the anterior wall of the stomach in order to facilitate anterior elevation of the stomach for performance of the ERCP. A gastrotomy was then created between these stitches with hook electrocautery. The 15 mm trocar was then placed through this gastrotomy. Intubating the stomach with the trocar was difficult, which caused a dissection plane between the serosa and the mucosa, revealing that the gastrotomy was not full thickness. The mucosa was then elevated and with hook electrocautery, a full thickness gastrotomy was created in this same location. The stomach was elevated with the stay stitches and the 15 mm trocar was successfully intubated into the stomach. The GI team then proceeded with an ERCP. Please see full procedure note dictated by the GI team for details of this portion of the procedure. Once the ERCP with  sphincterotomy and stone removal was completed, the stay stitches were elevated to facilitate closure of the gastrotomy. The stitches tore through the serosa and mucosa, making this portion of the procedure technically difficult. The stomach was grasped with a kiel through the 15 mm incision and 4 additional stay sutures were placed with 0 vicryl suture. A 60 mm endo GI stapler was used to attempt closure of the gastrotomy. Two loads were fired without successful closure of the gastrotomy. There was an extensive amount of visceral adiposity in this area and much difficulty was encountered maintaining pneumoperitoneum at this point in the case making closure of this gastrotomy laparoscopically quite challenging. Decision was made to widen the 15 mm epigastric/LUQ incision towards the midline to aid in visualization of the gastrotomy for safe and effective closure as well as completion cholecystectomy.     The incision was widened with a combination of cut and bovie electrocautery. The gastrotomy was completely visualized. The edges were debrided with bovie electrocautery, and the gastrotomy was closed in two layers, using 2-0 vicryl suture in a running fashion to close the mucosa and serosa, followed by 2-0 silk suture in an interrupted fashion to imbricate the suture line. Closure of the gastric remnant gastrotomy was successfully completed. Hemostasis was adequate. Omentum was placed over this area to buttress the repair.     Attention was then turned to the RUQ where the cholecystectomy was completed. The fundus of the gallbladder was grasped with a kiel and retracted caudad, to expose the separation between the liver and the gallbladder. Bovie electrocautery was used to dissect the gallbladder of the liver edge using the dome down approach. The gallbladder was then removed from the abdomen and handed off. The gallbladder fossa was hemostatic. 3 L warmed normal saline was used to irrigate the epigastric  region and the RUQ until clear effluent returned. A 15 Fr CHONG drain was placed through one of the RUQ 5 mm trocar sites and positioned adjacent to the gastrotomy repair, coursing along the gallbladder fossa. This CHONG drain was fastened at the skin with 3-0 nylon suture in standard fashion. The fascia was then closed with 1 PDS suture in a running fashion x 2, tying in the middle. The wound was irrigated. The skin was then closed with skin staples. The fascia of the 12 mm supraumbilical trocar site was closed with 0 vicryl suture in a figure of 8 fashion. The skin of each trocar site was closed with 4-0 monocryl suture and covered the skin glue. The larger incision was covered with a sterile dressing.     The patient was then allowed to awaken, extubated, and transferred to the PACU having tolerated the procedure well.  All instrument, needle, and sponge counts were correct at the end of the case. Radiofrequency detection was negative.    Dr. Ferguson was present for the entire procedure        Patient Disposition:  PACU         SIGNATURE: Herminia Blair DO  DATE: February 8, 2024  TIME: 12:16 AM

## 2024-02-08 NOTE — ED PROVIDER NOTES
Final Diagnoses:     1. Acute gallstone pancreatitis    2. Choledocholithiasis        ED Course as of 02/07/24 1936   Sun Feb 04, 2024   1548 BNP(!): 165   1608 Creatinine(!): 2.06   1610 hs TnI 0hr: 17   1612 Lipase(!): 4,994     Nursing Triage:     Chief Complaint   Patient presents with    Abdominal Problem    Shortness of Breath    Constipation     For the last 3 days has had abd pain and SOB no BM for three days also, normally has a BM daily. Hx CHF     HPI:   This is a 58 y.o. male presenting for evaluation of constipation and shortness of breath.   Relevant past medical history asthma, A-fib, CHF, diabetes, hypertension, hyperlipoidemia gastric bypass.  Patient states that he has some abdominal pain for 3 days, he states that he has not had a bowel movement for the same.  He normally has a bowel movement every day.  He says that the pain is also accompanied by shortness of breath.  He thinks that he has shortness of breath secondary to his pain.  But he is not sure.  He states that given his chronic medical conditions he wanted to be checked out.  He states nothing makes it better or worse.  He states that his last bowel movement was normal no blood.  He denies any headache, dizziness, chest pain, N/V/D, dysuria, recent travel, sick contacts.  ASSESSMENT + PLAN:   Will get general labs including BMP will also get a lipase we will get a chest x-ray we will also get a CT abdomen pelvis.  Patient is mildly hypotensive but will not give fluid at this time as he does have reduced ejection fraction.  Will get a point-of-care ultrasound of the right upper quadrant and lungs and heart.    Called urgently to the room because his blood pressure was dropping.  Ultrasound performed emergently he does have no effusion, IVC did have possibility greater than 50, no B-lines were present had some right ventricular dilation.  Will give 500 mL bolus of fluids.    Unable to obtain right upper quadrant ultrasound in the middle of  the night, will contact surgery.    Surgery excepting the patient for possible choledocholithiasis.    Physical:   Physical Exam  Vitals and nursing note reviewed.   Constitutional:       Appearance: Normal appearance. He is ill-appearing.   HENT:      Head: Normocephalic and atraumatic.      Nose: Nose normal.      Mouth/Throat:      Mouth: Mucous membranes are moist.      Pharynx: No oropharyngeal exudate or posterior oropharyngeal erythema.   Eyes:      Extraocular Movements: Extraocular movements intact.      Conjunctiva/sclera: Conjunctivae normal.      Pupils: Pupils are equal, round, and reactive to light.   Cardiovascular:      Rate and Rhythm: Normal rate and regular rhythm.      Pulses: Normal pulses.      Heart sounds: Normal heart sounds.   Pulmonary:      Effort: Pulmonary effort is normal. No tachypnea, bradypnea, accessory muscle usage or respiratory distress.      Breath sounds: Normal breath sounds. No decreased breath sounds or rhonchi.   Abdominal:      General: Abdomen is flat. There is no distension.      Palpations: Abdomen is soft.      Tenderness: There is abdominal tenderness (Right quadrant epigastric).   Musculoskeletal:         General: Normal range of motion.      Cervical back: Normal range of motion.      Right lower leg: No edema.      Left lower leg: No edema.   Lymphadenopathy:      Cervical: No cervical adenopathy.   Skin:     General: Skin is warm and dry.      Capillary Refill: Capillary refill takes less than 2 seconds.   Neurological:      General: No focal deficit present.      Mental Status: He is alert and oriented to person, place, and time.      Cranial Nerves: No cranial nerve deficit.      Sensory: No sensory deficit.   Psychiatric:         Mood and Affect: Mood normal.         Behavior: Behavior normal.         Vitals:    02/07/24 0300 02/07/24 0705 02/07/24 0949 02/07/24 1432   BP:  118/61 115/68 109/64   Pulse:  71 80 77   Resp:  18  18   Temp:    98 °F (36.7 °C)    TempSrc:       SpO2: 92% 99% 98% 99%   Weight:       Height:         Lab Results   Component Value Date    POCGLU 144 (H) 02/07/2024    POCGLU 122 02/07/2024    POCGLU 181 (H) 02/06/2024       - There are no obvious limitations to social determinants of care.   - Nursing note reviewed.   - Vitals reviewed.   - Orders placed by myself and/or advanced practitioner / resident.    - Previous chart was reviewed  - No language barrier.   - History obtained from patient.    - There are no limitations to the history obtained:     Past Medical:    has a past medical history of Acute gastritis without hemorrhage, Asthma, Atrial fibrillation (HCC), Bilateral leg edema (02/19/2020), CHF (congestive heart failure) ([facility]), Coronary artery disease, Daytime sleepiness (07/17/2019), Diabetes mellitus (HCC), Dyspnea on exertion (10/11/2021), Edema of both feet (01/23/2020), Gastric bypass status for obesity, Hyperlipidemia, Hypertension, Hypokalemia (11/14/2021), Impaired fasting glucose, Knee sprain, bilateral (06/14/2018), Leg cramps (06/16/2022), Obesity, and Viral gastroenteritis.    Past Surgical:    has a past surgical history that includes Gastric bypass (2004); Hernia repair; Carpal tunnel release; Tonsillectomy; Cardiac catheterization (N/A, 3/9/2022); Cardiac catheterization (N/A, 9/6/2022); Cardiac catheterization (N/A, 9/6/2022); Cardiac catheterization (N/A, 10/11/2023); and IR biopsy bone marrow (12/14/2023).    Social:     Social History     Substance and Sexual Activity   Alcohol Use Yes    Alcohol/week: 2.0 standard drinks of alcohol    Types: 2 Shots of liquor per week    Comment: social     Social History     Tobacco Use   Smoking Status Former    Types: Pipe, Cigars    Passive exposure: Never   Smokeless Tobacco Never   Tobacco Comments    cigar once a day     Social History     Substance and Sexual Activity   Drug Use No       Code Status: Level 1 - Full Code  Advance Directive and Living Will:      Power of  :    POLST:    Medications   ondansetron (ZOFRAN) injection 4 mg (4 mg Intravenous Given 2/7/24 1833)   acetaminophen (TYLENOL) tablet 650 mg ( Oral MAR Hold 2/7/24 1625)   oxyCODONE (ROXICODONE) IR tablet 5 mg ( Oral MAR Hold 2/7/24 1625)   oxyCODONE (ROXICODONE) immediate release tablet 10 mg ( Oral MAR Hold 2/7/24 1625)   HYDROmorphone (DILAUDID) injection 0.5 mg (0.5 mg Intravenous Given 2/7/24 1836)   heparin (porcine) subcutaneous injection 7,500 Units ( Subcutaneous Dose Auto Held 2/10/24 2200)   albuterol (PROVENTIL HFA,VENTOLIN HFA) inhaler 2 puff ( Inhalation MAR Hold 2/7/24 1625)   atorvastatin (LIPITOR) tablet 10 mg ( Oral Dose Auto Held 2/11/24 0900)   budesonide-formoterol (SYMBICORT) 160-4.5 mcg/act inhaler 2 puff ( Inhalation Dose Auto Held 2/11/24 1800)   loratadine (CLARITIN) tablet 10 mg ( Oral Dose Auto Held 2/11/24 0900)   metoprolol succinate (TOPROL-XL) 24 hr tablet 50 mg ( Oral Dose Auto Held 2/11/24 0900)   pantoprazole (PROTONIX) EC tablet 40 mg ( Oral Dose Auto Held 2/11/24 0900)   pregabalin (LYRICA) capsule 50 mg ( Oral Dose Auto Held 2/10/24 2200)   umeclidinium 62.5 mcg/actuation inhaler AEPB 1 puff ( Inhalation Dose Auto Held 2/11/24 0900)   insulin lispro (HumaLOG) 100 units/mL subcutaneous injection 2-12 Units ( Subcutaneous Dose Auto Held 2/12/24 1600)   insulin lispro (HumaLOG) 100 units/mL subcutaneous injection 2-12 Units ( Subcutaneous Dose Auto Held 2/11/24 2200)   multi-electrolyte (PLASMALYTE-A/ISOLYTE-S PH 7.4) IV solution (0 mL/hr Intravenous Stopped 2/7/24 1331)   ferrous sulfate tablet 325 mg ( Oral Dose Auto Held 2/10/24 0730)   lactated ringers infusion ( Intravenous Restarted 2/7/24 1805)   iohexol (OMNIPAQUE) 240 MG/ML solution (40 mL Other Given 2/7/24 1922)   sodium chloride 0.9 % irrigation solution (3,000 mL Irrigation Given 2/7/24 1831)   multi-electrolyte (ISOLYTE-S PH 7.4) bolus 1,000 mL (0 mL Intravenous Stopped 2/4/24 1625)   iohexol (OMNIPAQUE) 350  MG/ML injection (MULTI-DOSE) 100 mL (100 mL Intravenous Given 2/4/24 1536)   potassium chloride (Klor-Con M20) CR tablet 40 mEq (40 mEq Oral Given 2/6/24 0912)     MRI abdomen wo contrast and mrcp   Final Result      Gallstones.      5 mm low signal focus in the common duct is suspicious for a tiny common duct stone with perhaps a second small area of gravel. There is no common bile duct dilatation however.      No pancreatic duct dilatation. Scattered small cystic areas in the pancreas may represent small pseudocysts or side duct IPMNs. The pancreatic duct anatomy is not optimally seen and could be related to a prominent secondary duct in the pancreatic head.      Follow-up with contrast is suggested in 3 months time when the patient is no longer acute.      The study was marked in EPIC for immediate notification.      Workstation performed: [id number]         US right upper quadrant   Final Result      Cholelithiasis. No evidence of acute cholecystitis.      No intra or extrahepatic biliary ductal dilatation.      Moderate hepatic steatosis.      Workstation performed: [id number]         CT pe study w abdomen pelvis w contrast   Final Result   No acute pulmonary emboli identified. No pulmonary infiltrates. Note made of CardioMEMS device, stable position.      Mild fluid stranding surrounding the pancreatic tail raising suspicion for pancreatitis.      Prior gastric bypass surgery noted.      The study was marked in EPIC for immediate notification.         Workstation performed: [id number]         X-ray chest 1 view portable   Final Result      No acute cardiopulmonary disease.         Resident: CAMI ALBERTS I, the attending radiologist, have reviewed the images and agree with the final report above.      Workstation performed: [id number]         XR cholangiogram intraoperative    (Results Pending)     Orders Placed This Encounter   Procedures    POC Cardiac US    POC Biliary US    X-ray chest 1 view  portable    CT pe study w abdomen pelvis w contrast    US right upper quadrant    MRI abdomen wo contrast and mrcp    XR cholangiogram intraoperative    CBC and differential    HS Troponin 0hr (reflex protocol)    Comprehensive metabolic panel    Lipase    B-Type Natriuretic Peptide(BNP)    HS Troponin I 2hr    HS Troponin I 4hr    Comprehensive metabolic panel    CBC and differential    Magnesium    Phosphorus    CBC and differential    Comprehensive metabolic panel    CBC and differential    Comprehensive metabolic panel    TIBC Panel (incl. Iron, TIBC, % Iron Saturation)    Ferritin    CBC and differential    Comprehensive metabolic panel    Diet NPO; Sips with meds    Continuous cardiac monitoring    Continuous pulse oximetry    Vital signs per unit routine    Notify physician    Insert peripheral IV    Maintain IV access    Apply SCD only    Ambulate patient    Insulin Subcutaneous Notify Physician    Insulin Subcutaneous Instruction    Hypoglycemia Protocol    Fingerstick Glucose (POCT) Before meals and at bedtime    Fingerstick Glucose (POCT)    Notify physician    Insert peripheral IV    Check fingerstick glucose on all diabetic patients. Call if greater than 180.    Level 1-Full Code: all life saving measures are indicated    Inpatient consult to gastroenterology    ECG 12 lead    ECG 12 lead    ECG 12 lead repeat Q30 minutes PRN persistent chest pain x 2    ECG 12 lead repeat in 2hrs    ECG 12 lead repeat in 4hrs    ECG 12 lead    Type and screen    ABORh Recheck - Contact Blood Bank Prior to Collection    Place in Observation    Inpatient Admission    ERCP Fluoro     Labs Reviewed   CBC AND DIFFERENTIAL - Abnormal       Result Value Ref Range Status    WBC 6.38  4.31 - 10.16 Thousand/uL Final    RBC 3.76 (*) 3.88 - 5.62 Million/uL Final    Hemoglobin 9.7 (*) 12.0 - 17.0 g/dL Final    Hematocrit 30.7 (*) 36.5 - 49.3 % Final    MCV 82  82 - 98 fL Final    MCH 25.8 (*) 26.8 - 34.3 pg Final    MCHC 31.6  31.4  - 37.4 g/dL Final    RDW 16.9 (*) 11.6 - 15.1 % Final    MPV 10.4  8.9 - 12.7 fL Final    Platelets 283  149 - 390 Thousands/uL Final    nRBC 0  /100 WBCs Final    Neutrophils Relative 70  43 - 75 % Final    Immat GRANS % 1  0 - 2 % Final    Lymphocytes Relative 12 (*) 14 - 44 % Final    Monocytes Relative 11  4 - 12 % Final    Eosinophils Relative 5  0 - 6 % Final    Basophils Relative 1  0 - 1 % Final    Neutrophils Absolute 4.49  1.85 - 7.62 Thousands/µL Final    Immature Grans Absolute 0.04  0.00 - 0.20 Thousand/uL Final    Lymphocytes Absolute 0.76  0.60 - 4.47 Thousands/µL Final    Monocytes Absolute 0.73  0.17 - 1.22 Thousand/µL Final    Eosinophils Absolute 0.31  0.00 - 0.61 Thousand/µL Final    Basophils Absolute 0.05  0.00 - 0.10 Thousands/µL Final   COMPREHENSIVE METABOLIC PANEL - Abnormal    Sodium 133 (*) 135 - 147 mmol/L Final    Potassium 4.0  3.5 - 5.3 mmol/L Final    Chloride 98  96 - 108 mmol/L Final    CO2 21  21 - 32 mmol/L Final    ANION GAP 14  mmol/L Final    BUN 30 (*) 5 - 25 mg/dL Final    Creatinine 2.06 (*) 0.60 - 1.30 mg/dL Final    Comment: Standardized to IDMS reference method    Glucose 109  65 - 140 mg/dL Final    Comment: If the patient is fasting, the ADA then defines impaired fasting glucose as > 100 mg/dL and diabetes as > or equal to 123 mg/dL.    Calcium 8.1 (*) 8.4 - 10.2 mg/dL Final    AST 85 (*) 13 - 39 U/L Final    ALT 55 (*) 7 - 52 U/L Final    Comment: Specimen collection should occur prior to Sulfasalazine administration due to the potential for falsely depressed results.     Alkaline Phosphatase 340 (*) 34 - 104 U/L Final    Total Protein 6.5  6.4 - 8.4 g/dL Final    Albumin 3.5  3.5 - 5.0 g/dL Final    Total Bilirubin 2.15 (*) 0.20 - 1.00 mg/dL Final    Comment: Use of this assay is not recommended for patients undergoing treatment with eltrombopag due to the potential for falsely elevated results.  N-acetyl-p-benzoquinone imine (metabolite of Acetaminophen) will  "generate erroneously low results in samples for patients that have taken an overdose of Acetaminophen.    eGFR 34  ml/min/1.73sq m Final    Narrative:     National Kid[employer] guidelines for Chronic Kidney Disease (CKD):     Stage 1 with normal or high GFR (GFR > 90 mL/min/1.73 square meters)    Stage 2 Mild CKD (GFR = 60-89 mL/min/1.73 square meters)    Stage 3A Moderate CKD (GFR = 45-59 mL/min/1.73 square meters)    Stage 3B Moderate CKD (GFR = 30-44 mL/min/1.73 square meters)    Stage 4 Severe CKD (GFR = 15-29 mL/min/1.73 square meters)    Stage 5 End Stage CKD (GFR <15 mL/min/1.73 square meters)  Note: GFR calculation is accurate only with a steady state creatinine   LIPASE - Abnormal    Lipase 4,994 (*) 11 - 82 u/L Final   B-TYPE NATRIURETIC PEPTIDE (BNP) - Abnormal     (*) 0 - 100 pg/mL Final   HS TROPONIN I 0HR - Normal    hs TnI 0hr 17  \"Refer to ACS Flowchart\"- see link ng/L Final    Comment:                                              Initial (time 0) result  If >=50 ng/L, Myocardial injury suggested ;  Type of myocardial injury and treatment strategy  to be determined.  If 5-49 ng/L, a delta result at 2 hours and or 4 hours will be needed to further evaluate.  If <4 ng/L, and chest pain has been >3 hours since onset, patient may qualify for discharge based on the HEART score in the ED.  If <5 ng/L and <3hours since onset of chest pain, a delta result at 2 hours will be needed to further evaluate.    HS Troponin 99th Percentile URL of a Health Population=12 ng/L with a 95% Confidence Interval of 8-18 ng/L.    Second Troponin (time 2 hours)  If calculated delta >= 20 ng/L,  Myocardial injury suggested ; Type of myocardial injury and treatment strategy to be determined.  If 5-49 ng/L and the calculated delta is 5-19 ng/L, consult medical service for evaluation.  Continue evaluation for ischemia on ecg and other possible etiology and repeat hs troponin at 4 hours.  If delta is <5 ng/L at " "2 hours, consider discharge based on risk stratification via the HEART score (if in ED), or ISATU risk score in IP/Observation.    HS Troponin 99th Percentile URL of a Health Population=12 ng/L with a 95% Confidence Interval of 8-18 ng/L.   HS TROPONIN I 2HR - Normal    hs TnI 2hr 13  \"Refer to ACS Flowchart\"- see link ng/L Final    Comment:                                              Initial (time 0) result  If >=50 ng/L, Myocardial injury suggested ;  Type of myocardial injury and treatment strategy  to be determined.  If 5-49 ng/L, a delta result at 2 hours and or 4 hours will be needed to further evaluate.  If <4 ng/L, and chest pain has been >3 hours since onset, patient may qualify for discharge based on the HEART score in the ED.  If <5 ng/L and <3hours since onset of chest pain, a delta result at 2 hours will be needed to further evaluate.    HS Troponin 99th Percentile URL of a Health Population=12 ng/L with a 95% Confidence Interval of 8-18 ng/L.    Second Troponin (time 2 hours)  If calculated delta >= 20 ng/L,  Myocardial injury suggested ; Type of myocardial injury and treatment strategy to be determined.  If 5-49 ng/L and the calculated delta is 5-19 ng/L, consult medical service for evaluation.  Continue evaluation for ischemia on ecg and other possible etiology and repeat hs troponin at 4 hours.  If delta is <5 ng/L at 2 hours, consider discharge based on risk stratification via the HEART score (if in ED), or ISATU risk score in IP/Observation.    HS Troponin 99th Percentile URL of a Health Population=12 ng/L with a 95% Confidence Interval of 8-18 ng/L.    Delta 2hr hsTnI -4  <20 ng/L Final     Time reflects when diagnosis was documented in both MDM as applicable and the Disposition within this note       Time User Action Codes Description Comment    2/6/2024  8:56 AM Harvey Corbett [K85.10] Acute gallstone pancreatitis     2/6/2024  9:20 AM Harvey Corbett [K80.50] Choledocholithiasis           ED " Disposition       None          Follow-up Information    None       Current Discharge Medication List        CONTINUE these medications which have NOT CHANGED    Details   Accu-Chek FastClix Lancets MISC Test blood sugar twice daily  Qty: 200 each, Refills: 3    Associated Diagnoses: Type 2 diabetes mellitus with hyperglycemia, without long-term current use of insulin ([facility])      albuterol (PROVENTIL HFA,VENTOLIN HFA) 90 mcg/act inhaler Inhale 2 puffs every 4 (four) hours as needed for wheezing or shortness of breath  Qty: 18 g, Refills: 5    Comments: Substitution to a formulary equivalent within the same pharmaceutical class is authorized.  Associated Diagnoses: Mild intermittent asthma without complication      apixaban (Eliquis) 5 mg Take 1 tablet (5 mg total) by mouth 2 (two) times a day  Qty: 60 tablet, Refills: 5    Associated Diagnoses: A-fib ([facility]); Atrial fibrillation, unspecified type ([facility])      atorvastatin (LIPITOR) 10 mg tablet TAKE 1 TABLET BY MOUTH EVERY DAY  Qty: 90 tablet, Refills: 3    Associated Diagnoses: Mixed hyperlipidemia      Blood Glucose Monitoring Suppl (Accu-Chek Guide) w/Device KIT Use 2 (two) times a day Test blood sugar twice daily  Qty: 1 kit, Refills: 0    Associated Diagnoses: Type 2 diabetes mellitus with hyperglycemia, without long-term current use of insulin ([facility])      budesonide-formoterol (Symbicort) 160-4.5 mcg/act inhaler Inhale 2 puffs 2 (two) times a day Rinse mouth after use.  Qty: 30.6 g, Refills: 2    Associated Diagnoses: Moderate persistent asthma with acute exacerbation      bumetanide (BUMEX) 2 mg tablet Take 3 tablets (6 mg total) by mouth 2 (two) times a day  Qty: 540 tablet, Refills: 1    Associated Diagnoses: Chronic heart failure with preserved ejection fraction ([facility])      Empagliflozin (JARDIANCE) 10 MG TABS tablet Take 1 tablet (10 mg total) by mouth daily  Qty: 30 tablet, Refills: 11    Associated Diagnoses: Acute on chronic right-sided heart failure ([facility]);  Type 2 diabetes mellitus with stage 3b chronic kidney disease, without long-term current use of insulin ([facility])      EPINEPHrine (EPIPEN) 0.3 mg/0.3 mL SOAJ Inject 0.3 mL (0.3 mg total) into a muscle once for 1 dose  Qty: 0.6 mL, Refills: 0    Associated Diagnoses: Anaphylaxis, initial encounter      fluticasone (FLONASE) 50 mcg/act nasal spray 1 spray into each nostril 2 (two) times a day  Refills: 0    Associated Diagnoses: Nasal congestion      !! Klor-Con M20 20 MEQ tablet TAKE 2 TABLETS BY MOUTH 2 TIMES A DAY.  Qty: 360 tablet, Refills: 2    Associated Diagnoses: Diuretic-induced hypokalemia      loratadine (CLARITIN) 10 mg tablet Take 1 tablet (10 mg total) by mouth daily Do not start before October 13, 2023.  Refills: 0    Associated Diagnoses: Nasal congestion      metolazone (ZAROXOLYN) 2.5 mg tablet Take 2 tablets (5 mg total) by mouth if needed (weight gain of 3+ lbs in 24 hours, 5+ lbs in one week or signs of worsening fluid retention) Take 20-30 minutes before Bumex dose and with additional potassium    Associated Diagnoses: Chronic heart failure with preserved ejection fraction (HCC)      metoprolol succinate (TOPROL-XL) 50 mg 24 hr tablet Take 1 tablet (50 mg total) by mouth daily  Qty: 90 tablet, Refills: 0    Associated Diagnoses: Chronic heart failure with preserved ejection fraction (HCC)      montelukast (SINGULAIR) 10 mg tablet Take 1 tablet (10 mg total) by mouth daily at bedtime  Qty: 90 tablet, Refills: 1    Associated Diagnoses: Moderate persistent asthma with acute exacerbation      pantoprazole (PROTONIX) 40 mg tablet Take 1 tablet (40 mg total) by mouth daily  Qty: 21 tablet, Refills: 0    Associated Diagnoses: Severe persistent asthma with acute exacerbation      !! potassium chloride (K-DUR,KLOR-CON) 20 mEq tablet Take 2 tablets (40 mEq total) by mouth 2 (two) times a day  Qty: 120 tablet, Refills: 0    Associated Diagnoses: Diuretic-induced hypokalemia      pregabalin (LYRICA) 50 mg  capsule Take 1 caps. at bedtime  Qty: 30 capsule, Refills: 5    Associated Diagnoses: Diabetic polyneuropathy associated with type 2 diabetes mellitus ([facility])      sacubitril-valsartan (Entresto) 24-26 MG TABS Take 1 tablet by mouth 2 (two) times a day  Qty: 60 tablet, Refills: 0    Comments: Price Check Only Please  Associated Diagnoses: Chronic diastolic congestive heart failure ([facility])      sitaGLIPtin (Januvia) 100 mg tablet TAKE 1/2 TABLET DAILY  Qty: 15 tablet, Refills: 5    Associated Diagnoses: Type 2 diabetes mellitus with stage 3b chronic kidney disease, without long-term current use of insulin ([facility])      spironolactone (ALDACTONE) 25 mg tablet Take 1 tablet (25 mg total) by mouth daily  Qty: 90 tablet, Refills: 0    Associated Diagnoses: Chronic heart failure with preserved ejection fraction (HFpEF) ([facility])      tiotropium (Spiriva Respimat) 1.25 MCG/ACT AERS inhaler Inhale 2 puffs daily  Qty: 4 g, Refills: 0    Associated Diagnoses: Moderate persistent asthma with acute exacerbation       !! - Potential duplicate medications found. Please discuss with provider.        No discharge procedures on file.  Prior to Admission Medications   Prescriptions Last Dose Informant Patient Reported? Taking?   Accu-Chek FastClix Lancets MISC  Self No No   Sig: Test blood sugar twice daily   Blood Glucose Monitoring Suppl (Accu-Chek Guide) w/Device KIT  Self No No   Sig: Use 2 (two) times a day Test blood sugar twice daily   EPINEPHrine (EPIPEN) 0.3 mg/0.3 mL SOAJ  Self No No   Sig: Inject 0.3 mL (0.3 mg total) into a muscle once for 1 dose   Empagliflozin (JARDIANCE) 10 MG TABS tablet  Self No No   Sig: Take 1 tablet (10 mg total) by mouth daily   Klor-Con M20 20 MEQ tablet   No No   Sig: TAKE 2 TABLETS BY MOUTH 2 TIMES A DAY.   albuterol (PROVENTIL HFA,VENTOLIN HFA) 90 mcg/act inhaler   No No   Sig: Inhale 2 puffs every 4 (four) hours as needed for wheezing or shortness of breath   apixaban (Eliquis) 5 mg  Self No No   Sig:  Take 1 tablet (5 mg total) by mouth 2 (two) times a day   atorvastatin (LIPITOR) 10 mg tablet   No No   Sig: TAKE 1 TABLET BY MOUTH EVERY DAY   budesonide-formoterol (Symbicort) 160-4.5 mcg/act inhaler   No No   Sig: Inhale 2 puffs 2 (two) times a day Rinse mouth after use.   bumetanide (BUMEX) 2 mg tablet   No No   Sig: Take 3 tablets (6 mg total) by mouth 2 (two) times a day   fluticasone (FLONASE) 50 mcg/act nasal spray  Self No No   Si spray into each nostril 2 (two) times a day   loratadine (CLARITIN) 10 mg tablet  Self No No   Sig: Take 1 tablet (10 mg total) by mouth daily Do not start before 2023.   metolazone (ZAROXOLYN) 2.5 mg tablet   No No   Sig: Take 2 tablets (5 mg total) by mouth if needed (weight gain of 3+ lbs in 24 hours, 5+ lbs in one week or signs of worsening fluid retention) Take 20-30 minutes before Bumex dose and with additional potassium   metoprolol succinate (TOPROL-XL) 50 mg 24 hr tablet   No No   Sig: Take 1 tablet (50 mg total) by mouth daily   montelukast (SINGULAIR) 10 mg tablet  Self No No   Sig: Take 1 tablet (10 mg total) by mouth daily at bedtime   pantoprazole (PROTONIX) 40 mg tablet  Self No No   Sig: Take 1 tablet (40 mg total) by mouth daily   potassium chloride (K-DUR,KLOR-CON) 20 mEq tablet   No No   Sig: Take 2 tablets (40 mEq total) by mouth 2 (two) times a day   pregabalin (LYRICA) 50 mg capsule   No No   Sig: Take 1 caps. at bedtime   sacubitril-valsartan (Entresto) 24-26 MG TABS   No No   Sig: Take 1 tablet by mouth 2 (two) times a day   sitaGLIPtin (Januvia) 100 mg tablet  Self No No   Sig: TAKE 1/2 TABLET DAILY   Patient taking differently: 50 mg daily TAKE 1/2 TABLET DAILY   spironolactone (ALDACTONE) 25 mg tablet   No No   Sig: Take 1 tablet (25 mg total) by mouth daily   tiotropium (Spiriva Respimat) 1.25 MCG/ACT AERS inhaler   No No   Sig: Inhale 2 puffs daily      Facility-Administered Medications: None                        Portions of the  "record may have been created with voice recognition software. Occasional wrong word or \"sound a like\" substitutions may have occurred due to the inherent limitations of voice recognition software. Read the chart carefully and recognize, using context, where substitutions have occurred.     Earl Sarkar MD  02/07/24 1943    "

## 2024-02-09 ENCOUNTER — HOME HEALTH ADMISSION (OUTPATIENT)
Dept: HOME HEALTH SERVICES | Facility: HOME HEALTHCARE | Age: 59
End: 2024-02-09

## 2024-02-09 LAB
ALBUMIN SERPL BCP-MCNC: 3.3 G/DL (ref 3.5–5)
ALP SERPL-CCNC: 421 U/L (ref 34–104)
ALT SERPL W P-5'-P-CCNC: 30 U/L (ref 7–52)
ANION GAP SERPL CALCULATED.3IONS-SCNC: 10 MMOL/L
AST SERPL W P-5'-P-CCNC: 95 U/L (ref 13–39)
BASOPHILS # BLD AUTO: 0.02 THOUSANDS/ÂΜL (ref 0–0.1)
BASOPHILS NFR BLD AUTO: 0 % (ref 0–1)
BILIRUB SERPL-MCNC: 2.78 MG/DL (ref 0.2–1)
BUN SERPL-MCNC: 21 MG/DL (ref 5–25)
CALCIUM ALBUM COR SERPL-MCNC: 8.8 MG/DL (ref 8.3–10.1)
CALCIUM SERPL-MCNC: 8.2 MG/DL (ref 8.4–10.2)
CHLORIDE SERPL-SCNC: 99 MMOL/L (ref 96–108)
CO2 SERPL-SCNC: 24 MMOL/L (ref 21–32)
CREAT SERPL-MCNC: 1.86 MG/DL (ref 0.6–1.3)
EOSINOPHIL # BLD AUTO: 0.25 THOUSAND/ÂΜL (ref 0–0.61)
EOSINOPHIL NFR BLD AUTO: 4 % (ref 0–6)
ERYTHROCYTE [DISTWIDTH] IN BLOOD BY AUTOMATED COUNT: 17 % (ref 11.6–15.1)
GFR SERPL CREATININE-BSD FRML MDRD: 39 ML/MIN/1.73SQ M
GLUCOSE SERPL-MCNC: 205 MG/DL (ref 65–140)
GLUCOSE SERPL-MCNC: 220 MG/DL (ref 65–140)
GLUCOSE SERPL-MCNC: 239 MG/DL (ref 65–140)
GLUCOSE SERPL-MCNC: 292 MG/DL (ref 65–140)
GLUCOSE SERPL-MCNC: 295 MG/DL (ref 65–140)
HCT VFR BLD AUTO: 28.4 % (ref 36.5–49.3)
HGB BLD-MCNC: 8.4 G/DL (ref 12–17)
IMM GRANULOCYTES # BLD AUTO: 0.03 THOUSAND/UL (ref 0–0.2)
IMM GRANULOCYTES NFR BLD AUTO: 0 % (ref 0–2)
LYMPHOCYTES # BLD AUTO: 0.5 THOUSANDS/ÂΜL (ref 0.6–4.47)
LYMPHOCYTES NFR BLD AUTO: 8 % (ref 14–44)
MAGNESIUM SERPL-MCNC: 2.1 MG/DL (ref 1.9–2.7)
MCH RBC QN AUTO: 25.4 PG (ref 26.8–34.3)
MCHC RBC AUTO-ENTMCNC: 29.6 G/DL (ref 31.4–37.4)
MCV RBC AUTO: 86 FL (ref 82–98)
MONOCYTES # BLD AUTO: 0.31 THOUSAND/ÂΜL (ref 0.17–1.22)
MONOCYTES NFR BLD AUTO: 5 % (ref 4–12)
NEUTROPHILS # BLD AUTO: 5.58 THOUSANDS/ÂΜL (ref 1.85–7.62)
NEUTS SEG NFR BLD AUTO: 83 % (ref 43–75)
NRBC BLD AUTO-RTO: 0 /100 WBCS
PLATELET # BLD AUTO: 211 THOUSANDS/UL (ref 149–390)
PMV BLD AUTO: 10.9 FL (ref 8.9–12.7)
POTASSIUM SERPL-SCNC: 3.6 MMOL/L (ref 3.5–5.3)
PROT SERPL-MCNC: 5.6 G/DL (ref 6.4–8.4)
RBC # BLD AUTO: 3.31 MILLION/UL (ref 3.88–5.62)
SODIUM SERPL-SCNC: 133 MMOL/L (ref 135–147)
WBC # BLD AUTO: 6.69 THOUSAND/UL (ref 4.31–10.16)

## 2024-02-09 PROCEDURE — 83735 ASSAY OF MAGNESIUM: CPT

## 2024-02-09 PROCEDURE — 85025 COMPLETE CBC W/AUTO DIFF WBC: CPT

## 2024-02-09 PROCEDURE — 97166 OT EVAL MOD COMPLEX 45 MIN: CPT

## 2024-02-09 PROCEDURE — 80053 COMPREHEN METABOLIC PANEL: CPT

## 2024-02-09 PROCEDURE — 97163 PT EVAL HIGH COMPLEX 45 MIN: CPT

## 2024-02-09 PROCEDURE — 99232 SBSQ HOSP IP/OBS MODERATE 35: CPT | Performed by: INTERNAL MEDICINE

## 2024-02-09 PROCEDURE — 99024 POSTOP FOLLOW-UP VISIT: CPT | Performed by: SURGERY

## 2024-02-09 PROCEDURE — 82948 REAGENT STRIP/BLOOD GLUCOSE: CPT

## 2024-02-09 RX ORDER — BUMETANIDE 2 MG/1
6 TABLET ORAL 2 TIMES DAILY
Status: DISCONTINUED | OUTPATIENT
Start: 2024-02-09 | End: 2024-02-12 | Stop reason: HOSPADM

## 2024-02-09 RX ADMIN — FERROUS SULFATE TAB 325 MG (65 MG ELEMENTAL FE) 325 MG: 325 (65 FE) TAB at 07:39

## 2024-02-09 RX ADMIN — PANTOPRAZOLE SODIUM 40 MG: 40 TABLET, DELAYED RELEASE ORAL at 08:16

## 2024-02-09 RX ADMIN — PREGABALIN 50 MG: 50 CAPSULE ORAL at 21:36

## 2024-02-09 RX ADMIN — LORATADINE 10 MG: 10 TABLET ORAL at 08:16

## 2024-02-09 RX ADMIN — APIXABAN 5 MG: 5 TABLET, FILM COATED ORAL at 17:13

## 2024-02-09 RX ADMIN — APIXABAN 5 MG: 5 TABLET, FILM COATED ORAL at 12:49

## 2024-02-09 RX ADMIN — METRONIDAZOLE 500 MG: 500 INJECTION, SOLUTION INTRAVENOUS at 00:04

## 2024-02-09 RX ADMIN — INSULIN LISPRO 6 UNITS: 100 INJECTION, SOLUTION INTRAVENOUS; SUBCUTANEOUS at 11:53

## 2024-02-09 RX ADMIN — INSULIN LISPRO 4 UNITS: 100 INJECTION, SOLUTION INTRAVENOUS; SUBCUTANEOUS at 07:37

## 2024-02-09 RX ADMIN — METRONIDAZOLE 500 MG: 500 INJECTION, SOLUTION INTRAVENOUS at 23:32

## 2024-02-09 RX ADMIN — INSULIN LISPRO 6 UNITS: 100 INJECTION, SOLUTION INTRAVENOUS; SUBCUTANEOUS at 21:37

## 2024-02-09 RX ADMIN — HEPARIN SODIUM 7500 UNITS: 5000 INJECTION INTRAVENOUS; SUBCUTANEOUS at 05:21

## 2024-02-09 RX ADMIN — BUMETANIDE 6 MG: 2 TABLET ORAL at 17:13

## 2024-02-09 RX ADMIN — CEFAZOLIN SODIUM 2000 MG: 2 SOLUTION INTRAVENOUS at 16:14

## 2024-02-09 RX ADMIN — METRONIDAZOLE 500 MG: 500 INJECTION, SOLUTION INTRAVENOUS at 16:14

## 2024-02-09 RX ADMIN — CEFAZOLIN SODIUM 2000 MG: 2 SOLUTION INTRAVENOUS at 00:08

## 2024-02-09 RX ADMIN — INSULIN LISPRO 4 UNITS: 100 INJECTION, SOLUTION INTRAVENOUS; SUBCUTANEOUS at 17:13

## 2024-02-09 RX ADMIN — CEFAZOLIN SODIUM 2000 MG: 2 SOLUTION INTRAVENOUS at 07:40

## 2024-02-09 RX ADMIN — BUDESONIDE AND FORMOTEROL FUMARATE DIHYDRATE 2 PUFF: 160; 4.5 AEROSOL RESPIRATORY (INHALATION) at 08:16

## 2024-02-09 RX ADMIN — ALBUTEROL SULFATE 2 PUFF: 90 AEROSOL, METERED RESPIRATORY (INHALATION) at 08:16

## 2024-02-09 RX ADMIN — OXYCODONE HYDROCHLORIDE 10 MG: 10 TABLET ORAL at 05:20

## 2024-02-09 RX ADMIN — CEFAZOLIN SODIUM 2000 MG: 2 SOLUTION INTRAVENOUS at 23:32

## 2024-02-09 RX ADMIN — BUDESONIDE AND FORMOTEROL FUMARATE DIHYDRATE 2 PUFF: 160; 4.5 AEROSOL RESPIRATORY (INHALATION) at 17:17

## 2024-02-09 RX ADMIN — BUMETANIDE 6 MG: 2 TABLET ORAL at 12:49

## 2024-02-09 RX ADMIN — METOPROLOL SUCCINATE 50 MG: 50 TABLET, EXTENDED RELEASE ORAL at 08:16

## 2024-02-09 RX ADMIN — METRONIDAZOLE 500 MG: 500 INJECTION, SOLUTION INTRAVENOUS at 07:43

## 2024-02-09 RX ADMIN — ATORVASTATIN CALCIUM 10 MG: 10 TABLET, FILM COATED ORAL at 08:16

## 2024-02-09 NOTE — CASE MANAGEMENT
Case Management Note    Patient name Mesfin Cano  Location [facility]-01 MRN [id number]  : 1965 Date 2024       Current Admission Date: 2024  Current Admission Diagnosis:Acute gallstone pancreatitis      LOS (days): 3  Geometric Mean LOS (GMLOS) (days): 7.6  Days to GMLOS:4.5     OBJECTIVE:  Risk of Unplanned Readmission Score: 41.32   Current admission status: Inpatient   Primary Insurance: BLUE CROSS    DISCHARGE DETAILS:  Discharge planning discussed with:: Patient  Freedom of Choice: Yes  Were Treatment Team discharge recommendations reviewed with patient/caregiver?: Yes  Did patient/caregiver verbalize understanding of patient care needs?: Yes  Were patient/caregiver advised of the risks associated with not following Treatment Team discharge recommendations?: Yes    Requested Home Health Care         Is the patient interested in HHC at discharge?: Yes  Home Health Discipline requested:: Nursing, Physical Therapy, Occupational Therapy  Home Health Agency Name:: [facility]  Home Health Follow-Up Provider:: PCP  Home Health Services Needed:: Post-Op Care and Assessment, Evaluate Functional Status and Safety, Gait/ADL Training, Strengthening/Theraputic Exercises to Improve Function  Homebound Criteria Met:: Requires the Assistance of Another Person for Safe Ambulation or to Leave the Home  Supporting Clincal Findings:: Limited Endurance    Treatment Team Recommendation: Home with Home Health Care    Additional Comments: Pt is S/P Cholangiogram, ERCP, Cholecystectomy, and Gallstone Removal. Pt is recommended to have Home Health Care services via a [facility] consisting of in-home post-op skilled nursing care, PT, and OT. CM spoke to pt about this aftercare recommendation. Pt is agreeable w/ recommendation. CM provided pt w/ freedom of choice for VNA referrals. Pt requested referral to [facility]. CM made AIDIN referral to same. CM to follow.

## 2024-02-09 NOTE — PHYSICAL THERAPY NOTE
Physical Therapy Evaluation    Patient Name: Mesfin Cano    Today's Date: 2/9/2024     Problem List  Principal Problem:    Acute gallstone pancreatitis       Past Medical History  Past Medical History:   Diagnosis Date    Acute gastritis without hemorrhage     last assessed 3/24/17    Asthma     Atrial fibrillation (HCC)     Bilateral leg edema 02/19/2020    CHF (congestive heart failure) (HCC)     Coronary artery disease     Daytime sleepiness 07/17/2019    Diabetes mellitus (HCC)     Dyspnea on exertion 10/11/2021    Edema of both feet 01/23/2020    Gastric bypass status for obesity     Hyperlipidemia     Hypertension     Hypokalemia 11/14/2021    Impaired fasting glucose     last assessed 5/10/17    Knee sprain, bilateral 06/14/2018    Leg cramps 06/16/2022    Obesity     Viral gastroenteritis     last assessed 11/4/16        Past Surgical History  Past Surgical History:   Procedure Laterality Date    CARDIAC CATHETERIZATION N/A 3/9/2022    Procedure: CARDIAC RHC W/ PRESSURE SENSOR;  Surgeon: Aminata Wilcox MD;  [facility];  Service: Cardiology    CARDIAC CATHETERIZATION N/A 9/6/2022    Procedure: Cardiac catheterization;  Surgeon: Yolanda Del Rosario DO;  [facility];  Service: Cardiology    CARDIAC CATHETERIZATION N/A 9/6/2022    Procedure: Cardiac Coronary Angiogram;  Surgeon: Yolanda Del Rosario DO;  [facility];  Service: Cardiology    CARDIAC CATHETERIZATION N/A 10/11/2023    Procedure: Cardiac RHC;  Surgeon: Yoel Darden MD;  [facility];  Service: Cardiology    CARPAL TUNNEL RELEASE      bilateral    CHOLECYSTECTOMY LAPAROSCOPIC N/A 2/7/2024    Procedure: CHOLECYSTECTOMY LAPAROSCOPIC;  Surgeon: Nena Ferguson MD;  [facility];  Service: General    GASTRIC BYPASS  2004    with han en y    HERNIA REPAIR      ventral inscisional    IR BIOPSY BONE MARROW  12/14/2023  "   LAPAROSCOPIC TRANSGASTRIC ACCESS FOR ERCP N/A 2/7/2024    Procedure: LAPAROSCOPIC TRANSGASTRIC ACCESS FOR ERCP;  Surgeon: Nena Ferguson MD;  [facility];  Service: General    LAPAROSCOPIC TRANSGASTRIC ACCESS FOR ERCP N/A 2/7/2024    Procedure: ERCP, sphincterotomy, stone extraction;  Surgeon: Rey Ferguson MD;  [facility];  Service: Gastroenterology    TONSILLECTOMY             02/09/24 0917   PT Last Visit   PT Visit Date 02/09/24   Note Type   Note type Evaluation   Pain Assessment   Pain Assessment Tool 0-10   Pain Score No Pain   Restrictions/Precautions   Weight Bearing Precautions Per Order No   Other Precautions Multiple lines;Telemetry;Fall Risk;Chair Alarm;Bed Alarm  (+ CHONG drain)   Home Living   Type of Home House   Home Layout Multi-level;Laundry in basement;Stairs to enter without rails;Bed/bath upstairs  (6 MADYSON, lives in \"1/2 of a double\")   Bathroom Shower/Tub Tub/shower unit   Bathroom Toilet Raised   Bathroom Equipment Other (Comment)  (no DME)   Bathroom Accessibility Accessible   Home Equipment Cane  (has cane but only uses occasionally)   Prior Function   Level of [place] Independent with ADLs;Independent with functional mobility;Needs assistance with IADLS  (wife and daughter assist with IADLs)   Lives With Family  (Wife and daughter)   Receives Help From Family   IADLs Independent with driving;Family/Friend/Other provides meals;Family/Friend/Other provides medication management   Falls in the last 6 months 1 to 4  (2 falls)   Vocational Other (Comment)   Comments Currently does not work and applying for disability   General   Additional Pertinent History CHF, asthma   Family/Caregiver Present No   Cognition   Overall Cognitive Status WFL   Arousal/Participation Cooperative   Attention Within functional limits   Orientation Level Oriented X4   Memory Within functional limits   Following Commands Follows all commands and directions without difficulty   RLE " Assessment   RLE Assessment WFL   LLE Assessment   LLE Assessment WFL   Light Touch   RLE Light Touch Grossly intact   LLE Light Touch Grossly intact   Bed Mobility   Supine to Sit Unable to assess   Additional Comments Pt greeted in bedside chair upon entering room.   Transfers   Sit to Stand 5  Supervision  (Close S)   Additional items Armrests;Increased time required;Verbal cues  (required VCs for using armrests and to not pull on RW)   Stand to Sit 5  Supervision   Additional items Armrests;Increased time required   Additional Comments RW, VCs for UE assist on chair   Ambulation/Elevation   Gait pattern Wide ALLYSON;Decreased foot clearance;Excessively slow;Step through pattern;Forward Flexion;Improper Weight shift  (L LE excessive ER at hip)   Gait Assistance 5  Supervision   Additional items Assist x 1  (Close S)   Assistive Device Rolling walker   Distance 75ft x 2   Stair Management Assistance Not tested   Balance   Static Sitting Fair +   Dynamic Sitting Fair   Static Standing Fair -   Dynamic Standing Fair -   Ambulatory Poor +   Endurance Deficit   Endurance Deficit Yes   Endurance Deficit Description fatigued and weakness in B/L LE   Activity Tolerance   Activity Tolerance Patient limited by fatigue   Medical Staff Made Aware PT Taylor, HAL Joe   Nurse Made Aware yes-cleared   Assessment   Prognosis Good   Problem List Decreased strength;Decreased endurance;Decreased range of motion;Impaired balance;Decreased mobility;Obesity   Assessment Pt is a 58 y.o. M, admitted on 2/4/2024, with acute gallstone pancreatitis. Symptoms at admission consist of: constipation and SOB. Comorbidities/PMH consist of: asthma, A-fib, CHF, diabetes, hypertension, hyperlipoidemia gastric bypass. Pt presentation consistent with high complexity due to current medical presentation, decreased functional mobility kristian, assistance required t/o treatment, impaired static and dynamic balance, CHONG drain s/p laparoscopic  cholecystectomy, and PMH. Upon evaluation pt required assist levels of close S for transfers, and S for ambulation. Stairs were unable to be assessed due to fatigue and lack of activity kristian, and bed mobility was unable to be assessed due to pt starting and ending session in bedside chair. The current strength and endurance deficits lead to a limitation with functional mobility, amb, repetitive motions and prolonged WBing, which restricts him from returning to independent ADLs and IADLs. The patient's AM-PAC Basic Mobility Inpatient Short Form Raw Score is 17. A Raw score of greater than or equal to 16 suggests the patient may benefit from discharge to home. Please also refer to the recommendation of the Physical Therapist for safe discharge planning. Despite current AM-PAC score, pt would benefit from skilled PT intervention to address above mentioned functional deficits to maximize their safety in home and the community, increase functional mobility kristian, improve static and dynamic balance, maximize ADL kristian, minimize caregiver burden and minimize potential for skin breakdown. Pt was left sitting in bedside chair with alarm donned, call bell and phone within reach.   Barriers to Discharge None   Goals   Patient Goals To go home   STG Expiration Date 02/23/24   Short Term Goal #1 In 14 days time, pt will:  1. Increase bed mobility to independent to decrease caregiver burden and improve functional mobility.  2. Increase transfer kristian to independent to improve functional mobility and increase independence.   3. Improve amb kristian to over 200 ft independently to increase functinal mobility, increase independence and decrease caregiver burden.  4. Improve stair/elevation kristian to a level of independent x 7 steps to increase functional mobility and maximize independence to negotiate home and perform ADLs.  5. Increase balance by 1/2 grade to increase independence and decrease fall risk and increase safety in home/community.   PT  Treatment Day 0   Plan   Treatment/Interventions ADL retraining;Functional transfer training;LE strengthening/ROM;Elevations;Therapeutic exercise;Endurance training;Patient/family training;Equipment eval/education;Bed mobility;Gait training;Spoke to nursing;Spoke to case management;OT   PT Frequency 3-5x/wk   Discharge Recommendation   Rehab Resource Intensity Level, PT III (Minimum Resource Intensity)   Equipment Recommended Walker   Walker Package Recommended HD Bariatric wheeled walker   Change/add to Walker Package? No   AM-PAC Basic Mobility Inpatient   Turning in Flat Bed Without Bedrails 3   Lying on Back to Sitting on Edge of Flat Bed Without Bedrails 3   Moving Bed to Chair 3   Standing Up From Chair Using Arms 3   Walk in Room 3   Climb 3-5 Stairs With Railing 2   Basic Mobility Inpatient Raw Score 17   Basic Mobility Standardized Score 39.67   Highest Level Of Mobility   -HLM Goal 5: Stand one or more mins   -HLM Achieved 7: Walk 25 feet or more   Modified Wever Scale   Modified Wever Scale 4   Barthel Index   Feeding 10   Bathing 5   Grooming Score 0   Dressing Score 5   Bladder Score 10   Bowels Score 10   Toilet Use Score 5   Transfers (Bed/Chair) Score 10   Mobility (Level Surface) Score 10   Stairs Score 5   Barthel Index Score 70   End of Consult   Patient Position at End of Consult Bedside chair;Bed/Chair alarm activated;All needs within reach       Al Jerome, SPT

## 2024-02-09 NOTE — OCCUPATIONAL THERAPY NOTE
Occupational Therapy Evaluation     Patient Name: Mesfin Cano  Today's Date: 2/9/2024  Problem List  Principal Problem:    Acute gallstone pancreatitis    Past Medical History  Past Medical History:   Diagnosis Date    Acute gastritis without hemorrhage     last assessed 3/24/17    Asthma     Atrial fibrillation (HCC)     Bilateral leg edema 02/19/2020    CHF (congestive heart failure) (HCC)     Coronary artery disease     Daytime sleepiness 07/17/2019    Diabetes mellitus (HCC)     Dyspnea on exertion 10/11/2021    Edema of both feet 01/23/2020    Gastric bypass status for obesity     Hyperlipidemia     Hypertension     Hypokalemia 11/14/2021    Impaired fasting glucose     last assessed 5/10/17    Knee sprain, bilateral 06/14/2018    Leg cramps 06/16/2022    Obesity     Viral gastroenteritis     last assessed 11/4/16     Past Surgical History  Past Surgical History:   Procedure Laterality Date    CARDIAC CATHETERIZATION N/A 3/9/2022    Procedure: CARDIAC RHC W/ PRESSURE SENSOR;  Surgeon: Aminata Wilcox MD;  [facility];  Service: Cardiology    CARDIAC CATHETERIZATION N/A 9/6/2022    Procedure: Cardiac catheterization;  Surgeon: Yolanda Del Rosario DO;  [facility];  Service: Cardiology    CARDIAC CATHETERIZATION N/A 9/6/2022    Procedure: Cardiac Coronary Angiogram;  Surgeon: Yolanda Del Rosario DO;  [facility];  Service: Cardiology    CARDIAC CATHETERIZATION N/A 10/11/2023    Procedure: Cardiac RHC;  Surgeon: Yoel Darden MD;  [facility];  Service: Cardiology    CARPAL TUNNEL RELEASE      bilateral    CHOLECYSTECTOMY LAPAROSCOPIC N/A 2/7/2024    Procedure: CHOLECYSTECTOMY LAPAROSCOPIC;  Surgeon: Nena Ferguson MD;  [facility];  Service: General    GASTRIC BYPASS  2004    with han en y    HERNIA REPAIR      ventral inscisional    IR BIOPSY BONE MARROW  12/14/2023    LAPAROSCOPIC TRANSGASTRIC ACCESS FOR ERCP N/A  2/7/2024    Procedure: LAPAROSCOPIC TRANSGASTRIC ACCESS FOR ERCP;  Surgeon: Nena Ferguson MD;  [facility];  Service: General    LAPAROSCOPIC TRANSGASTRIC ACCESS FOR ERCP N/A 2/7/2024    Procedure: ERCP, sphincterotomy, stone extraction;  Surgeon: Rey Ferguson MD;  [facility];  Service: Gastroenterology    TONSILLECTOMY             02/09/24 0919   OT Last Visit   OT Visit Date 02/09/24   Note Type   Note type Evaluation   Pain Assessment   Pain Assessment Tool 0-10   Pain Score No Pain   Restrictions/Precautions   Weight Bearing Precautions Per Order No   Other Precautions Fall Risk;Telemetry   Home Living   Type of Home House   Home Layout Multi-level;Laundry in basement;Bed/bath upstairs;Stairs to enter without rails  (Pt lives in 3 story home, primarily stays on 2nd level with bed/bath. 6 MADYSON)   Bathroom Shower/Tub Tub/shower unit   Bathroom Toilet Raised   Bathroom Equipment   (No DME)   Home Equipment Cane   Prior Function   Level of [place] Independent with ADLs;Independent with functional mobility;Needs assistance with IADLS   Lives With Spouse;Daughter   Receives Help From Family   IADLs Independent with driving;Family/Friend/Other provides meals;Family/Friend/Other provides medication management   Falls in the last 6 months 1 to 4  (2 falls)   Vocational Retired  (Applying for Disability)   Lifestyle   Autonomy Pt was I with ADLs + driving, receives help from family with IADLs. Uses cane as needed for functional mobility   Reciprocal Relationships supportive family   Service to Others Retired   Intrinsic Gratification Enjoys watching TV   ADL   Where Assessed Edge of bed   Eating Assistance 7  Independent   Grooming Assistance 7  Independent   UB Bathing Assistance 5  Supervision/Setup   LB Bathing Assistance 5  Supervision/Setup   UB Dressing Assistance 5  Supervision/Setup   LB Dressing Assistance 5  Supervision/Setup   Toileting Assistance  5  Supervision/Setup   Bed  Mobility   Supine to Sit Unable to assess   Additional Comments Pt presented in chair upon arrival   Transfers   Sit to Stand 5  Supervision   Additional items Increased time required   Stand to Sit 5  Supervision   Additional items Increased time required   Additional Comments RW   Functional Mobility   Functional Mobility 5  Supervision   Additional Comments Pt completed functional mobility of house hold distance with RW and supervision.   Balance   Static Sitting Fair +   Dynamic Sitting Fair   Static Standing Fair   Dynamic Standing Fair -   Ambulatory Fair -   Activity Tolerance   Activity Tolerance Patient limited by fatigue   Medical Staff Made Aware OT Mattie, PT Taylor, SPT Al   Nurse Made Aware Yes-cleared   RUE Assessment   RUE Assessment WFL   LUE Assessment   LUE Assessment WFL   Cognition   Overall Cognitive Status WFL   Arousal/Participation Alert;Responsive;Cooperative   Attention Within functional limits   Orientation Level Oriented X4   Memory Within functional limits   Following Commands Follows all commands and directions without difficulty   Comments Pt was pleasant and cooperative t/o session. Able to recall previous events, maintain attention, and follow all commands.   Assessment   Assessment Pt seen for Occupational Therapy Evaluation. Pt is 58 y.o. male admitted to [facility] on  2/4/2024 with Acute gallstone pancreatitis. Pt is now s/p lap-assisted ERCP with sphincterotomy and extraction of stones x3 and sludge as well as laparoscopic converted to open cholecystectomy and wedge gastrectomy on 2/8/24. Pt has a past medical history of Acute gastritis without hemorrhage, Asthma, Atrial fibrillation (HCC), Bilateral leg edema, CHF (congestive heart failure) (HCC), Coronary artery disease, Daytime sleepiness, Diabetes mellitus (HCC), Dyspnea on exertion, Edema of both feet, Gastric bypass status for obesity, Hyperlipidemia, Hypertension, Hypokalemia, Impaired fasting glucose, Knee  sprain, bilateral, Leg cramps, Obesity, and Viral gastroenteritis. Pt presented in chair upon arrival. Pt lives with wife and daughter in a 1/2 double home with 3 stories (primarily on the 2nd floor) and 6 MADYSON. Pt experienced  2 fall(s) in the past 6 months. Pt was I with ADLs, functional mobility, and transfers with a cane as needed, + for driving. Pt received help with IADLs from family at baseline. Pt was A/Ox 4 and able to recall previous events. Unable to assess pt's bed mobility. Pt required supervision for standing and functional mobility/transfers, and ADLs. Pt currently functioning closer to baseline with minimal impairments in the following categories: mobility, transfers, difficulty performing ADLs/IADLs (self-care, grooming, cooking, cleaning), activity tolerance, endurance, standing balance/tolerance and sitting balance/tolerance. Pt fatigue limits pt's ability to engage in all baseline areas of occupation including those listed above. The patient's raw score on the AM-PAC Daily Activity Inpatient Short Form is 21. A raw score of greater than or equal to 19 suggests the patient may benefit from discharge to home. Please refer to the recommendation of the Occupational Therapist for safe discharge planning. From OT standpoint no further acute care is required at this time. Recommend safe D/C home with supportive family.   AM-PAC Daily Activity Inpatient   Lower Body Dressing 3   Bathing 3   Toileting 3   Upper Body Dressing 4   Grooming 4   Eating 4   Daily Activity Raw Score 21   Daily Activity Standardized Score (Calc for Raw Score >=11) 44.27   AM-PAC Applied Cognition Inpatient   Following a Speech/Presentation 4   Understanding Ordinary Conversation 4   Taking Medications 4   Remembering Where Things Are Placed or Put Away 4   Remembering List of 4-5 Errands 3   Taking Care of Complicated Tasks 3   Applied Cognition Raw Score 22   Applied Cognition Standardized Score 47.83   End of Consult   Patient  Position at End of Consult Bedside chair;Bed/Chair alarm activated;All needs within reach   Nurse Communication Nurse aware of consult     Simon Underwood, OTS

## 2024-02-09 NOTE — PROGRESS NOTES
Patient:  MESFIN GRANDAOS    MRN:  [id number]    Aidin Request ID:  [id number]    Level of care reserved:  Home Health Agency    Partner Reserved:  [facility], [place] [address] [phone]    Clinical needs requested:    Geography searched:  [phone]    Start of Service:    Request sent:  11:33am EST on 2/9/2024 by Mesfin Rose    Partner reserved:  11:43am EST on 2/9/2024 by Mesfin Rose    Choice list shared:  11:43am EST on 2/9/2024 by Mesfin Rose

## 2024-02-09 NOTE — PROGRESS NOTES
Progress Note - General Surgery   Mesfin Cano 58 y.o. male MRN: [id number]  Unit/Bed#: [facility] 521-01 Encounter: [id number]    Assessment:  58 y.o. male with gallstone pancreatitis, status post 2/7 laparoscopic IOC, lap assisted ERCP, open cholecystectomy     Plan:  - Low fat diet  - Maintain CHONG drain  - Restart home meds  - Pain and nausea control PRN  - Encourage IS, ambulation   - PT/OT eval and treat   - DVT ppx   - Follow up CM for dispo plan    Subjective:  No acute events overnight. Pain controlled. Tolerating diet. Patient denies nausea, vomiting, fever, chills, chest pain, shortness of breath. Endorses passing flatus, voiding. No bowel movements yet.     Objective:    Vitals:   Afebrile, Normal VS on room air.  Temp:  [97.3 °F (36.3 °C)-98.6 °F (37 °C)] 97.3 °F (36.3 °C)  HR:  [77-85] 85  Resp:  [17-20] 18  BP: (105-128)/(53-77) 126/77  Body mass index is 46.36 kg/m².    Physical Exam:   Gen: NAD, Resting in bed  Neuro: A&O, No focal deficits  Head: Normal Cephalic, Atraumatic  Eye: EOMI, No scleral icterus  CV: Regular rate  Pulm: Normal work of breathing, No respiratory distress on RA  Abd: Soft, Non-Distended, appropriately tender silvana-incisionally; CHONG in place  Ext: Trace edema bilateral lower extremities, Non-tender  Skin: Warm, Dry, Intact    I/O:  UO: 2L  CHONG: 25 cc ss    Intake/Output Summary (Last 24 hours) at 2/9/2024 0921  Last data filed at 2/9/2024 0816  Gross per 24 hour   Intake 820 ml   Output 2025 ml   Net -1205 ml       Lab Results:  Recent Labs     02/08/24  0435 02/09/24  0531   WBC 13.04* 6.69   HGB 8.7* 8.4*    211   SODIUM 133* 133*   K 4.4 3.6    99   CO2 21 24   BUN 19 21   CREATININE 1.70* 1.86*   GLUC 247* 205*   CALCIUM 8.3* 8.2*   MG  --  2.1   * 95*   ALT 83* 30   ALKPHOS 430* 421*   TBILI 2.77* 2.78*       ---    Qi St MD  General Surgery PGY-I

## 2024-02-09 NOTE — Clinical Note
In 14 days time, pt will:  Increase bed mobility to independent to decrease caregiver burden and improve functional mobility.  Increase transfer kristian to independent to improve functional mobility and increase independence.   Improve amb kristian to over 200 ft independently to increase functinal mobility, increase independence and decrease caregiver burden.  Improve stair/elevation kristian to a level of independent x 7 steps to increase functional mobility and maximize independence to negotiate home and perform ADLs.  Increase balance by 1/2 grade to increase independence and decrease fall risk and increase safety in home/community.       Pt is a 58 y.o. M, admitted on 2/4/2024, with acute gallstone pancreatitis. Symptoms at admission consist of: constipation and SOB. Comorbidities/PMH consist of: asthma, A-fib, CHF, diabetes, hypertension, hyperlipoidemia gastric bypass. Pt presentation consistent with high complexity due to current medical presentation, decreased functional mobility kristian, assistance required t/o treatment, impaired static and dynamic balance, CHONG drain s/p laparoscopic cholecystectomy, and PMH. Upon evaluation pt required assist levels of close S for transfers, and S for ambulation. Stairs were unable to be assessed due to fatigue and lack of activity kristian, and bed mobility was unable to be assessed due to pt starting and ending session in bedside chair. The current strength and endurance deficits lead to a limitation with functional mobility, amb, repetitive motions and prolonged WBing, which restricts him from returning to independent ADLs and IADLs. The patient's AM-PAC Basic Mobility Inpatient Short Form Raw Score is 17. A Raw score of greater than or equal to 16 suggests the patient may benefit from discharge to home. Please also refer to the recommendation of the Physical Therapist for safe discharge planning. Despite current AM-PAC score, pt would benefit from skilled PT intervention to address above  mentioned functional deficits to maximize their safety in home and the community, increase functional mobility kristian, improve static and dynamic balance, maximize ADL kristian, minimize caregiver burden and minimize potential for skin breakdown. Pt was left sitting in bedside chair with alarm donned, call bell and phone within reach.

## 2024-02-09 NOTE — PLAN OF CARE
Problem: Potential for Falls  Goal: Patient will remain free of falls  Description: INTERVENTIONS:  - Educate patient/family on patient safety including physical limitations  - Instruct patient to call for assistance with activity   - Consult OT/PT to assist with strengthening/mobility   - Keep Call bell within reach  - Keep bed low and locked with side rails adjusted as appropriate  - Keep care items and personal belongings within reach  - Initiate and maintain comfort rounds  - Make Fall Risk Sign visible to staff  - Offer Toileting every  Hours, in advance of need  - Initiate/Maintain alarm  - Obtain necessary fall risk management equipment:   - Apply yellow socks and bracelet for high fall risk patients  - Consider moving patient to room near nurses station  Outcome: Progressing     Problem: PAIN - ADULT  Goal: Verbalizes/displays adequate comfort level or baseline comfort level  Description: Interventions:  - Encourage patient to monitor pain and request assistance  - Assess pain using appropriate pain scale  - Administer analgesics based on type and severity of pain and evaluate response  - Implement non-pharmacological measures as appropriate and evaluate response  - Consider cultural and social influences on pain and pain management  - Notify physician/advanced practitioner if interventions unsuccessful or patient reports new pain  Outcome: Progressing     Problem: DISCHARGE PLANNING  Goal: Discharge to home or other facility with appropriate resources  Description: INTERVENTIONS:  - Identify barriers to discharge w/patient and caregiver  - Arrange for needed discharge resources and transportation as appropriate  - Identify discharge learning needs (meds, wound care, etc.)  - Arrange for interpretive services to assist at discharge as needed  - Refer to Case Management Department for coordinating discharge planning if the patient needs post-hospital services based on physician/advanced practitioner order  or complex needs related to functional status, cognitive ability, or social support system  Outcome: Progressing     Problem: Knowledge Deficit  Goal: Patient/family/caregiver demonstrates understanding of disease process, treatment plan, medications, and discharge instructions  Description: Complete learning assessment and assess knowledge base.  Interventions:  - Provide teaching at level of understanding  - Provide teaching via preferred learning methods  Outcome: Progressing     Problem: Nutrition/Hydration-ADULT  Goal: Nutrient/Hydration intake appropriate for improving, restoring or maintaining nutritional needs  Description: Monitor and assess patient's nutrition/hydration status for malnutrition. Collaborate with interdisciplinary team and initiate plan and interventions as ordered.  Monitor patient's weight and dietary intake as ordered or per policy. Utilize nutrition screening tool and intervene as necessary. Determine patient's food preferences and provide high-protein, high-caloric foods as appropriate.     INTERVENTIONS:  - Monitor oral intake, urinary output, labs, and treatment plans  - Assess nutrition and hydration status and recommend course of action  - Evaluate amount of meals eaten  - Assist patient with eating if necessary   - Allow adequate time for meals  - Recommend/ encourage appropriate diets, oral nutritional supplements, and vitamin/mineral supplements  - Order, calculate, and assess calorie counts as needed  - Recommend, monitor, and adjust tube feedings and TPN/PPN based on assessed needs  - Assess need for intravenous fluids  - Provide specific nutrition/hydration education as appropriate  - Include patient/family/caregiver in decisions related to nutrition  Outcome: Progressing

## 2024-02-09 NOTE — PLAN OF CARE
Problem: PHYSICAL THERAPY ADULT  Goal: Performs mobility at highest level of function for planned discharge setting.  See evaluation for individualized goals.  Description: Treatment/Interventions: ADL retraining, Functional transfer training, LE strengthening/ROM, Elevations, Therapeutic exercise, Endurance training, Patient/family training, Equipment eval/education, Bed mobility, Gait training, Spoke to nursing, Spoke to case management, OT  Equipment Recommended: Walker       See flowsheet documentation for full assessment, interventions and recommendations.  Note: Prognosis: Good  Problem List: Decreased strength, Decreased endurance, Decreased range of motion, Impaired balance, Decreased mobility, Obesity  Assessment: Pt is a 58 y.o. M, admitted on 2/4/2024, with acute gallstone pancreatitis. Symptoms at admission consist of: constipation and SOB. Comorbidities/PMH consist of: asthma, A-fib, CHF, diabetes, hypertension, hyperlipoidemia gastric bypass. Pt presentation consistent with high complexity due to current medical presentation, decreased functional mobility kristian, assistance required t/o treatment, impaired static and dynamic balance, CHONG drain s/p laparoscopic cholecystectomy, and PMH. Upon evaluation pt required assist levels of close S for transfers, and S for ambulation. Stairs were unable to be assessed due to fatigue and lack of activity kristian, and bed mobility was unable to be assessed due to pt starting and ending session in bedside chair. The current strength and endurance deficits lead to a limitation with functional mobility, amb, repetitive motions and prolonged WBing, which restricts him from returning to independent ADLs and IADLs. The patient's AM-PAC Basic Mobility Inpatient Short Form Raw Score is 17. A Raw score of greater than or equal to 16 suggests the patient may benefit from discharge to home. Please also refer to the recommendation of the Physical Therapist for safe discharge  planning. Despite current AM-PAC score, pt would benefit from skilled PT intervention to address above mentioned functional deficits to maximize their safety in home and the community, increase functional mobility kristian, improve static and dynamic balance, maximize ADL kristian, minimize caregiver burden and minimize potential for skin breakdown. Pt was left sitting in bedside chair with alarm donned, call bell and phone within reach.  Barriers to Discharge: None     Rehab Resource Intensity Level, PT: III (Minimum Resource Intensity)    See flowsheet documentation for full assessment.

## 2024-02-09 NOTE — PROGRESS NOTES
Progress Note -  [facility]  Mesfin Cano 58 y.o. male MRN: [id number]  Unit/Bed#: [facility] 521-01 Encounter: [id number]      ASSESSMENT AND PLAN:      58-year-old male with history significant for Samuel-en-Y gastric bypass, HFrEF (EF 50%), A-fib on Eliquis, ventral hernia, and asthma, who presented to the hospital with postprandial epigastric abdominal pain, found to have acute gallstone pancreatitis with elevated lipase and inflammation seen on imaging. He was also found to have elevated liver enzymes along with gallstones within the gallbladder and 2 small stones in the distal CBD concerning for choledocholithiasis.     He is now s/p lap-assisted ERCP with sphincterotomy and extraction of stones x3 and sludge as well as laparoscopic converted to open cholecystectomy and wedge gastrectomy on 2/8/24.     He remains clinically stable. Tolerating solid food and liquid intake without epigastric pain. Liver enzymes are somewhat improved, expect them to continue to gradually improve over time.    Repeat liver enzymes in 1 week to monitor for continued improvement.   Okay to discharge home from GI standpoint.   Gi will sign off. Please call with questions or concerns.    Rest of care per primary team.    ______________________________________________________________________    Subjective:  Denies significant abdominal pain. Tolerating PO intake without pain.    REVIEW OF SYSTEMS:  10 point ROS reviewed and negative except otherwise noted in the HPI above.     Historical Information   Past Medical History:   Diagnosis Date    Acute gastritis without hemorrhage     last assessed 3/24/17    Asthma     Atrial fibrillation (HCC)     Bilateral leg edema 02/19/2020    CHF (congestive heart failure) (HCC)     Coronary artery disease     Daytime sleepiness 07/17/2019    Diabetes mellitus (HCC)     Dyspnea on exertion 10/11/2021    Edema of both feet 01/23/2020    Gastric bypass status for obesity     Hyperlipidemia      Hypertension     Hypokalemia 11/14/2021    Impaired fasting glucose     last assessed 5/10/17    Knee sprain, bilateral 06/14/2018    Leg cramps 06/16/2022    Obesity     Viral gastroenteritis     last assessed 11/4/16     Past Surgical History:   Procedure Laterality Date    CARDIAC CATHETERIZATION N/A 3/9/2022    Procedure: CARDIAC RHC W/ PRESSURE SENSOR;  Surgeon: Amianta Wilcox MD;  Location: [facility];  Service: Cardiology    CARDIAC CATHETERIZATION N/A 9/6/2022    Procedure: Cardiac catheterization;  Surgeon: Yolanda Del Rosario DO;  Location: [facility];  Service: Cardiology    CARDIAC CATHETERIZATION N/A 9/6/2022    Procedure: Cardiac Coronary Angiogram;  Surgeon: Yolanda Del Rosario DO;  Location: [facility];  Service: Cardiology    CARDIAC CATHETERIZATION N/A 10/11/2023    Procedure: Cardiac RHC;  Surgeon: Yoel Darden MD;  Location: [facility];  Service: Cardiology    CARPAL TUNNEL RELEASE      bilateral    CHOLECYSTECTOMY LAPAROSCOPIC N/A 2/7/2024    Procedure: CHOLECYSTECTOMY LAPAROSCOPIC;  Surgeon: Nena Ferguson MD;  Location: [facility];  Service: General    GASTRIC BYPASS  2004    with han en y    HERNIA REPAIR      ventral inscisional    IR BIOPSY BONE MARROW  12/14/2023    LAPAROSCOPIC TRANSGASTRIC ACCESS FOR ERCP N/A 2/7/2024    Procedure: LAPAROSCOPIC TRANSGASTRIC ACCESS FOR ERCP;  Surgeon: Nena Ferguson MD;  Location: [facility];  Service: General    LAPAROSCOPIC TRANSGASTRIC ACCESS FOR ERCP N/A 2/7/2024    Procedure: ERCP, sphincterotomy, stone extraction;  Surgeon: Rey Ferguson MD;  Location: [facility];  Service: Gastroenterology    TONSILLECTOMY       Social History   Social History     Substance and Sexual Activity   Alcohol Use Yes    Alcohol/week: 2.0 standard drinks of alcohol    Types: 2 Shots of liquor per week    Comment: social     Social History     Substance and Sexual Activity   Drug Use No     Social History     Tobacco Use    Smoking Status Former    Types: Pipe, Cigars    Passive exposure: Never   Smokeless Tobacco Never   Tobacco Comments    cigar once a day     Family History   Problem Relation Age of Onset    Stroke Mother     Hypertension Father     Cancer Father     COPD Father        Meds/Allergies     Medications Prior to Admission   Medication    Accu-Chek FastClix Lancets MISC    albuterol (PROVENTIL HFA,VENTOLIN HFA) 90 mcg/act inhaler    apixaban (Eliquis) 5 mg    atorvastatin (LIPITOR) 10 mg tablet    Blood Glucose Monitoring Suppl (Accu-Chek Guide) w/Device KIT    budesonide-formoterol (Symbicort) 160-4.5 mcg/act inhaler    bumetanide (BUMEX) 2 mg tablet    Empagliflozin (JARDIANCE) 10 MG TABS tablet    EPINEPHrine (EPIPEN) 0.3 mg/0.3 mL SOAJ    fluticasone (FLONASE) 50 mcg/act nasal spray    Klor-Con M20 20 MEQ tablet    loratadine (CLARITIN) 10 mg tablet    metolazone (ZAROXOLYN) 2.5 mg tablet    metoprolol succinate (TOPROL-XL) 50 mg 24 hr tablet    montelukast (SINGULAIR) 10 mg tablet    pantoprazole (PROTONIX) 40 mg tablet    potassium chloride (K-DUR,KLOR-CON) 20 mEq tablet    pregabalin (LYRICA) 50 mg capsule    sacubitril-valsartan (Entresto) 24-26 MG TABS    sitaGLIPtin (Januvia) 100 mg tablet    spironolactone (ALDACTONE) 25 mg tablet    tiotropium (Spiriva Respimat) 1.25 MCG/ACT AERS inhaler     Current Facility-Administered Medications   Medication Dose Route Frequency    acetaminophen (TYLENOL) tablet 650 mg  650 mg Oral Q6H PRN    albuterol (PROVENTIL HFA,VENTOLIN HFA) inhaler 2 puff  2 puff Inhalation Q4H PRN    apixaban (ELIQUIS) tablet 5 mg  5 mg Oral BID    atorvastatin (LIPITOR) tablet 10 mg  10 mg Oral Daily    budesonide-formoterol (SYMBICORT) 160-4.5 mcg/act inhaler 2 puff  2 puff Inhalation BID    bumetanide (BUMEX) tablet 6 mg  6 mg Oral BID    ceFAZolin (ANCEF) IVPB (premix in dextrose) 2,000 mg 50 mL  2,000 mg Intravenous Q8H    ferrous sulfate tablet 325 mg  325 mg Oral Daily With Breakfast     "HYDROmorphone (DILAUDID) injection 0.5 mg  0.5 mg Intravenous Q3H PRN    insulin lispro (HumaLOG) 100 units/mL subcutaneous injection 2-12 Units  2-12 Units Subcutaneous TID AC    insulin lispro (HumaLOG) 100 units/mL subcutaneous injection 2-12 Units  2-12 Units Subcutaneous HS    loratadine (CLARITIN) tablet 10 mg  10 mg Oral Daily    metoprolol succinate (TOPROL-XL) 24 hr tablet 50 mg  50 mg Oral Daily    metroNIDAZOLE (FLAGYL) IVPB (premix) 500 mg 100 mL  500 mg Intravenous Q8H    ondansetron (ZOFRAN) injection 4 mg  4 mg Intravenous Q6H PRN    oxyCODONE (ROXICODONE) immediate release tablet 10 mg  10 mg Oral Q4H PRN    oxyCODONE (ROXICODONE) IR tablet 5 mg  5 mg Oral Q4H PRN    pantoprazole (PROTONIX) EC tablet 40 mg  40 mg Oral Daily    pregabalin (LYRICA) capsule 50 mg  50 mg Oral HS    umeclidinium 62.5 mcg/actuation inhaler AEPB 1 puff  1 puff Inhalation Daily       Allergies   Allergen Reactions    Azithromycin Other (See Comments)     Shaky, uneasy feeling     Bactrim [Sulfamethoxazole-Trimethoprim] Other (See Comments)     shakiness       Objective     Blood pressure 126/77, pulse 85, temperature (!) 97.3 °F (36.3 °C), temperature source Oral, resp. rate 18, height 6' 1\" (1.854 m), weight (!) 159 kg (351 lb 6.6 oz), SpO2 99%. Body mass index is 46.36 kg/m².    PHYSICAL EXAM:    General: Well-appearing, NAD  Eyes: no conjunctival icterus or pallor  Abdominal: Soft, mildly TTP right abdomen, non-distended  Extremities: Warm, no deformities, no edema  Neuro: alert and oriented  Psych: Normal affect    Lab Results:   No results displayed because visit has over 200 results.          Imaging Studies: I have personally reviewed pertinent imaging studies.    Ginger Espitia D.O.  Fellow, PGY-5  Division of Gastroenterology & Hepatology  Available on [employer]  2/9/2024 1:57 PM      "

## 2024-02-10 LAB
ALBUMIN SERPL BCP-MCNC: 3.2 G/DL (ref 3.5–5)
ALP SERPL-CCNC: 385 U/L (ref 34–104)
ALT SERPL W P-5'-P-CCNC: 10 U/L (ref 7–52)
ANION GAP SERPL CALCULATED.3IONS-SCNC: 10 MMOL/L
AST SERPL W P-5'-P-CCNC: 40 U/L (ref 13–39)
BILIRUB SERPL-MCNC: 1.33 MG/DL (ref 0.2–1)
BUN SERPL-MCNC: 18 MG/DL (ref 5–25)
CALCIUM ALBUM COR SERPL-MCNC: 9 MG/DL (ref 8.3–10.1)
CALCIUM SERPL-MCNC: 8.4 MG/DL (ref 8.4–10.2)
CHLORIDE SERPL-SCNC: 96 MMOL/L (ref 96–108)
CO2 SERPL-SCNC: 28 MMOL/L (ref 21–32)
CREAT SERPL-MCNC: 1.73 MG/DL (ref 0.6–1.3)
GFR SERPL CREATININE-BSD FRML MDRD: 43 ML/MIN/1.73SQ M
GLUCOSE SERPL-MCNC: 144 MG/DL (ref 65–140)
GLUCOSE SERPL-MCNC: 185 MG/DL (ref 65–140)
GLUCOSE SERPL-MCNC: 212 MG/DL (ref 65–140)
GLUCOSE SERPL-MCNC: 220 MG/DL (ref 65–140)
GLUCOSE SERPL-MCNC: 267 MG/DL (ref 65–140)
POTASSIUM SERPL-SCNC: 3.5 MMOL/L (ref 3.5–5.3)
PROT SERPL-MCNC: 6.1 G/DL (ref 6.4–8.4)
SODIUM SERPL-SCNC: 134 MMOL/L (ref 135–147)

## 2024-02-10 PROCEDURE — 80053 COMPREHEN METABOLIC PANEL: CPT | Performed by: SURGERY

## 2024-02-10 PROCEDURE — 82948 REAGENT STRIP/BLOOD GLUCOSE: CPT

## 2024-02-10 PROCEDURE — 99024 POSTOP FOLLOW-UP VISIT: CPT | Performed by: SURGERY

## 2024-02-10 RX ORDER — POTASSIUM CHLORIDE 20 MEQ/1
40 TABLET, EXTENDED RELEASE ORAL ONCE
Status: COMPLETED | OUTPATIENT
Start: 2024-02-10 | End: 2024-02-10

## 2024-02-10 RX ADMIN — ATORVASTATIN CALCIUM 10 MG: 10 TABLET, FILM COATED ORAL at 08:09

## 2024-02-10 RX ADMIN — APIXABAN 5 MG: 5 TABLET, FILM COATED ORAL at 08:09

## 2024-02-10 RX ADMIN — UMECLIDINIUM 1 PUFF: 62.5 AEROSOL, POWDER ORAL at 11:55

## 2024-02-10 RX ADMIN — ACETAMINOPHEN 650 MG: 325 TABLET, FILM COATED ORAL at 22:10

## 2024-02-10 RX ADMIN — CEFAZOLIN SODIUM 2000 MG: 2 SOLUTION INTRAVENOUS at 17:28

## 2024-02-10 RX ADMIN — BUMETANIDE 6 MG: 2 TABLET ORAL at 17:27

## 2024-02-10 RX ADMIN — BUDESONIDE AND FORMOTEROL FUMARATE DIHYDRATE 2 PUFF: 160; 4.5 AEROSOL RESPIRATORY (INHALATION) at 17:29

## 2024-02-10 RX ADMIN — INSULIN LISPRO 6 UNITS: 100 INJECTION, SOLUTION INTRAVENOUS; SUBCUTANEOUS at 11:49

## 2024-02-10 RX ADMIN — POTASSIUM CHLORIDE 40 MEQ: 1500 TABLET, EXTENDED RELEASE ORAL at 08:08

## 2024-02-10 RX ADMIN — PANTOPRAZOLE SODIUM 40 MG: 40 TABLET, DELAYED RELEASE ORAL at 08:09

## 2024-02-10 RX ADMIN — INSULIN LISPRO 4 UNITS: 100 INJECTION, SOLUTION INTRAVENOUS; SUBCUTANEOUS at 17:36

## 2024-02-10 RX ADMIN — FERROUS SULFATE TAB 325 MG (65 MG ELEMENTAL FE) 325 MG: 325 (65 FE) TAB at 08:08

## 2024-02-10 RX ADMIN — APIXABAN 5 MG: 5 TABLET, FILM COATED ORAL at 17:27

## 2024-02-10 RX ADMIN — CEFAZOLIN SODIUM 2000 MG: 2 SOLUTION INTRAVENOUS at 08:13

## 2024-02-10 RX ADMIN — METRONIDAZOLE 500 MG: 500 INJECTION, SOLUTION INTRAVENOUS at 17:29

## 2024-02-10 RX ADMIN — BUDESONIDE AND FORMOTEROL FUMARATE DIHYDRATE 2 PUFF: 160; 4.5 AEROSOL RESPIRATORY (INHALATION) at 08:08

## 2024-02-10 RX ADMIN — BUMETANIDE 6 MG: 2 TABLET ORAL at 08:09

## 2024-02-10 RX ADMIN — METRONIDAZOLE 500 MG: 500 INJECTION, SOLUTION INTRAVENOUS at 08:16

## 2024-02-10 RX ADMIN — INSULIN LISPRO 4 UNITS: 100 INJECTION, SOLUTION INTRAVENOUS; SUBCUTANEOUS at 21:00

## 2024-02-10 RX ADMIN — INSULIN LISPRO 2 UNITS: 100 INJECTION, SOLUTION INTRAVENOUS; SUBCUTANEOUS at 08:09

## 2024-02-10 RX ADMIN — METOPROLOL SUCCINATE 50 MG: 50 TABLET, EXTENDED RELEASE ORAL at 08:08

## 2024-02-10 RX ADMIN — LORATADINE 10 MG: 10 TABLET ORAL at 08:09

## 2024-02-10 RX ADMIN — PREGABALIN 50 MG: 50 CAPSULE ORAL at 21:00

## 2024-02-10 NOTE — PROGRESS NOTES
"Progress Note - General Surgery  Mesfin Cano 58 y.o. male MRN: [id number]  Unit/Bed#: [facility] 521-01 Encounter: [id number]      Assessment:  58 y.o. male with gallstone pancreatitis, status post 2/7 laparoscopic IOC, lap assisted ERCP, open cholecystectomy      Afebrile, HDS       Plan:  - Low fat diet as tolerated  - Continue antibiotics  - Continue home meds  - PRN analgesia and antiemetics  - PT/OT  - Encourage OOB and ambulation  - On eliquis, covers DVT ppx.  - Dispo planning.        Subjective: No acute events overnight. Afebrile, hemodynamically stable. Tolerating diet. No nausea, or vomiting, fevers or chills.         Objective:   Temp:  [97.8 °F (36.6 °C)-98.8 °F (37.1 °C)] 97.9 °F (36.6 °C)  HR:  [76-90] 87  Resp:  [18-20] 18  BP: (119-141)/(56-84) 131/76    Physical Exam:  General: No acute distress, alert and oriented  CV: Well perfused, regular rate and rhythm  Lungs: Normal work of breathing, no increased respiratory effort  Abdomen: Soft, appropriately-tender, non-distended. Incision(s) clean, dry and intact.  Extremities: No edema, clubbing or cyanosis  Skin: Warm, dry      I/O         02/08 0701  02/09 0700 02/09 0701  02/10 0700 02/10 0701  02/11 0700    P.O. 600 1540 180    I.V. (mL/kg)       NG/GT       IV Piggyback       Total Intake(mL/kg) 600 (3.8) 1540 (9.8) 180 (1.1)    Urine (mL/kg/hr) 2025 (0.5) 4435 (1.2)     Drains 25 30     Stool  0     Blood       Total Output 2050 4465     Net -1450 -2925 +180           Unmeasured Urine Occurrence  2 x     Unmeasured Stool Occurrence  0 x             Lab, Imaging and other studies: I have personally reviewed pertinent reports.  , CBC with diff: No results found for: \"WBC\", \"HGB\", \"HCT\", \"MCV\", \"PLT\", \"ADJUSTEDWBC\", \"RBC\", \"MCH\", \"MCHC\", \"RDW\", \"MPV\", \"NRBC\", BMP/CMP:   Lab Results   Component Value Date    SODIUM 134 (L) 02/10/2024    K 3.5 02/10/2024    CL 96 02/10/2024    CO2 28 02/10/2024    BUN 18 02/10/2024    CREATININE 1.73 (H) 02/10/2024    " CALCIUM 8.4 02/10/2024    AST 40 (H) 02/10/2024    ALT 10 02/10/2024    ALKPHOS 385 (H) 02/10/2024    EGFR 43 02/10/2024           Paulino Brown MD  General Surgery   02/10/24

## 2024-02-10 NOTE — PLAN OF CARE
Problem: Potential for Falls  Goal: Patient will remain free of falls  Description: INTERVENTIONS:  - Educate patient/family on patient safety including physical limitations  - Instruct patient to call for assistance with activity   - Consult OT/PT to assist with strengthening/mobility   - Keep Call bell within reach  - Keep bed low and locked with side rails adjusted as appropriate  - Keep care items and personal belongings within reach  - Initiate and maintain comfort rounds  - Make Fall Risk Sign visible to staff  - Offer Toileting every  Hours, in advance of need  - Initiate/Maintain alarm  - Obtain necessary fall risk management equipment:   -Apply yellow socks and bracelet for high fall risk patients  - Consider moving patient to room near nurses station  Outcome: Progressing     Problem: PAIN - ADULT  Goal: Verbalizes/displays adequate comfort level or baseline comfort level  Description: Interventions:  - Encourage patient to monitor pain and request assistance  - Assess pain using appropriate pain scale  - Administer analgesics based on type and severity of pain and evaluate response  - Implement non-pharmacological measures as appropriate and evaluate response  - Consider cultural and social influences on pain and pain management  - Notify physician/advanced practitioner if interventions unsuccessful or patient reports new pain  Outcome: Progressing     Problem: DISCHARGE PLANNING  Goal: Discharge to home or other facility with appropriate resources  Description: INTERVENTIONS:  - Identify barriers to discharge w/patient and caregiver  - Arrange for needed discharge resources and transportation as appropriate  - Identify discharge learning needs (meds, wound care, etc.)  - Arrange for interpretive services to assist at discharge as needed  - Refer to Case Management Department for coordinating discharge planning if the patient needs post-hospital services based on physician/advanced practitioner order or  complex needs related to functional status, cognitive ability, or social support system  Outcome: Progressing     Problem: Knowledge Deficit  Goal: Patient/family/caregiver demonstrates understanding of disease process, treatment plan, medications, and discharge instructions  Description: Complete learning assessment and assess knowledge base.  Interventions:  - Provide teaching at level of understanding  - Provide teaching via preferred learning methods  Outcome: Progressing     Problem: Nutrition/Hydration-ADULT  Goal: Nutrient/Hydration intake appropriate for improving, restoring or maintaining nutritional needs  Description: Monitor and assess patient's nutrition/hydration status for malnutrition. Collaborate with interdisciplinary team and initiate plan and interventions as ordered.  Monitor patient's weight and dietary intake as ordered or per policy. Utilize nutrition screening tool and intervene as necessary. Determine patient's food preferences and provide high-protein, high-caloric foods as appropriate.     INTERVENTIONS:  - Monitor oral intake, urinary output, labs, and treatment plans  - Assess nutrition and hydration status and recommend course of action  - Evaluate amount of meals eaten  - Assist patient with eating if necessary   - Allow adequate time for meals  - Recommend/ encourage appropriate diets, oral nutritional supplements, and vitamin/mineral supplements  - Order, calculate, and assess calorie counts as needed  - Recommend, monitor, and adjust tube feedings and TPN/PPN based on assessed needs  - Assess need for intravenous fluids  - Provide specific nutrition/hydration education as appropriate  - Include patient/family/caregiver in decisions related to nutrition  Outcome: Progressing

## 2024-02-11 LAB
ALBUMIN SERPL BCP-MCNC: 3 G/DL (ref 3.5–5)
ALP SERPL-CCNC: 329 U/L (ref 34–104)
ALT SERPL W P-5'-P-CCNC: 3 U/L (ref 7–52)
ANION GAP SERPL CALCULATED.3IONS-SCNC: 11 MMOL/L
AST SERPL W P-5'-P-CCNC: 25 U/L (ref 13–39)
BASOPHILS # BLD AUTO: 0.04 THOUSANDS/ÂΜL (ref 0–0.1)
BASOPHILS NFR BLD AUTO: 1 % (ref 0–1)
BILIRUB SERPL-MCNC: 1.01 MG/DL (ref 0.2–1)
BUN SERPL-MCNC: 18 MG/DL (ref 5–25)
CALCIUM ALBUM COR SERPL-MCNC: 8.9 MG/DL (ref 8.3–10.1)
CALCIUM SERPL-MCNC: 8.1 MG/DL (ref 8.4–10.2)
CHLORIDE SERPL-SCNC: 98 MMOL/L (ref 96–108)
CO2 SERPL-SCNC: 28 MMOL/L (ref 21–32)
CREAT SERPL-MCNC: 1.58 MG/DL (ref 0.6–1.3)
EOSINOPHIL # BLD AUTO: 0.54 THOUSAND/ÂΜL (ref 0–0.61)
EOSINOPHIL NFR BLD AUTO: 9 % (ref 0–6)
ERYTHROCYTE [DISTWIDTH] IN BLOOD BY AUTOMATED COUNT: 17.3 % (ref 11.6–15.1)
GFR SERPL CREATININE-BSD FRML MDRD: 48 ML/MIN/1.73SQ M
GLUCOSE SERPL-MCNC: 170 MG/DL (ref 65–140)
GLUCOSE SERPL-MCNC: 172 MG/DL (ref 65–140)
GLUCOSE SERPL-MCNC: 232 MG/DL (ref 65–140)
GLUCOSE SERPL-MCNC: 244 MG/DL (ref 65–140)
GLUCOSE SERPL-MCNC: 266 MG/DL (ref 65–140)
HCT VFR BLD AUTO: 26.7 % (ref 36.5–49.3)
HGB BLD-MCNC: 7.9 G/DL (ref 12–17)
IMM GRANULOCYTES # BLD AUTO: 0.1 THOUSAND/UL (ref 0–0.2)
IMM GRANULOCYTES NFR BLD AUTO: 2 % (ref 0–2)
LYMPHOCYTES # BLD AUTO: 0.94 THOUSANDS/ÂΜL (ref 0.6–4.47)
LYMPHOCYTES NFR BLD AUTO: 16 % (ref 14–44)
MCH RBC QN AUTO: 25.3 PG (ref 26.8–34.3)
MCHC RBC AUTO-ENTMCNC: 29.6 G/DL (ref 31.4–37.4)
MCV RBC AUTO: 86 FL (ref 82–98)
MONOCYTES # BLD AUTO: 0.58 THOUSAND/ÂΜL (ref 0.17–1.22)
MONOCYTES NFR BLD AUTO: 10 % (ref 4–12)
NEUTROPHILS # BLD AUTO: 3.84 THOUSANDS/ÂΜL (ref 1.85–7.62)
NEUTS SEG NFR BLD AUTO: 62 % (ref 43–75)
NRBC BLD AUTO-RTO: 0 /100 WBCS
PLATELET # BLD AUTO: 218 THOUSANDS/UL (ref 149–390)
PMV BLD AUTO: 11.6 FL (ref 8.9–12.7)
POTASSIUM SERPL-SCNC: 3.5 MMOL/L (ref 3.5–5.3)
PROT SERPL-MCNC: 5.7 G/DL (ref 6.4–8.4)
RBC # BLD AUTO: 3.12 MILLION/UL (ref 3.88–5.62)
SODIUM SERPL-SCNC: 137 MMOL/L (ref 135–147)
WBC # BLD AUTO: 6.04 THOUSAND/UL (ref 4.31–10.16)

## 2024-02-11 PROCEDURE — 85025 COMPLETE CBC W/AUTO DIFF WBC: CPT | Performed by: SURGERY

## 2024-02-11 PROCEDURE — 82948 REAGENT STRIP/BLOOD GLUCOSE: CPT

## 2024-02-11 PROCEDURE — 99024 POSTOP FOLLOW-UP VISIT: CPT | Performed by: SURGERY

## 2024-02-11 PROCEDURE — 80053 COMPREHEN METABOLIC PANEL: CPT | Performed by: SURGERY

## 2024-02-11 RX ORDER — OXYCODONE HYDROCHLORIDE 5 MG/1
5 TABLET ORAL EVERY 4 HOURS PRN
Status: DISCONTINUED | OUTPATIENT
Start: 2024-02-11 | End: 2024-02-12 | Stop reason: HOSPADM

## 2024-02-11 RX ORDER — POTASSIUM CHLORIDE 20 MEQ/1
20 TABLET, EXTENDED RELEASE ORAL ONCE
Status: COMPLETED | OUTPATIENT
Start: 2024-02-11 | End: 2024-02-11

## 2024-02-11 RX ORDER — AMOXICILLIN AND CLAVULANATE POTASSIUM 875; 125 MG/1; MG/1
1 TABLET, FILM COATED ORAL EVERY 12 HOURS SCHEDULED
Status: DISCONTINUED | OUTPATIENT
Start: 2024-02-11 | End: 2024-02-12 | Stop reason: HOSPADM

## 2024-02-11 RX ORDER — SPIRONOLACTONE 25 MG/1
25 TABLET ORAL DAILY
Status: DISCONTINUED | OUTPATIENT
Start: 2024-02-11 | End: 2024-02-12 | Stop reason: HOSPADM

## 2024-02-11 RX ADMIN — INSULIN LISPRO 4 UNITS: 100 INJECTION, SOLUTION INTRAVENOUS; SUBCUTANEOUS at 17:05

## 2024-02-11 RX ADMIN — AMOXICILLIN AND CLAVULANATE POTASSIUM 1 TABLET: 875; 125 TABLET, FILM COATED ORAL at 10:14

## 2024-02-11 RX ADMIN — FERROUS SULFATE TAB 325 MG (65 MG ELEMENTAL FE) 325 MG: 325 (65 FE) TAB at 08:29

## 2024-02-11 RX ADMIN — METOPROLOL SUCCINATE 50 MG: 50 TABLET, EXTENDED RELEASE ORAL at 08:29

## 2024-02-11 RX ADMIN — PANTOPRAZOLE SODIUM 40 MG: 40 TABLET, DELAYED RELEASE ORAL at 08:29

## 2024-02-11 RX ADMIN — CEFAZOLIN SODIUM 2000 MG: 2 SOLUTION INTRAVENOUS at 00:09

## 2024-02-11 RX ADMIN — APIXABAN 5 MG: 5 TABLET, FILM COATED ORAL at 17:04

## 2024-02-11 RX ADMIN — METRONIDAZOLE 500 MG: 500 INJECTION, SOLUTION INTRAVENOUS at 00:08

## 2024-02-11 RX ADMIN — INSULIN LISPRO 6 UNITS: 100 INJECTION, SOLUTION INTRAVENOUS; SUBCUTANEOUS at 21:18

## 2024-02-11 RX ADMIN — BUMETANIDE 6 MG: 2 TABLET ORAL at 17:04

## 2024-02-11 RX ADMIN — ATORVASTATIN CALCIUM 10 MG: 10 TABLET, FILM COATED ORAL at 08:29

## 2024-02-11 RX ADMIN — INSULIN LISPRO 4 UNITS: 100 INJECTION, SOLUTION INTRAVENOUS; SUBCUTANEOUS at 11:18

## 2024-02-11 RX ADMIN — APIXABAN 5 MG: 5 TABLET, FILM COATED ORAL at 08:29

## 2024-02-11 RX ADMIN — UMECLIDINIUM 1 PUFF: 62.5 AEROSOL, POWDER ORAL at 08:33

## 2024-02-11 RX ADMIN — AMOXICILLIN AND CLAVULANATE POTASSIUM 1 TABLET: 875; 125 TABLET, FILM COATED ORAL at 21:18

## 2024-02-11 RX ADMIN — INSULIN LISPRO 2 UNITS: 100 INJECTION, SOLUTION INTRAVENOUS; SUBCUTANEOUS at 08:32

## 2024-02-11 RX ADMIN — ACETAMINOPHEN 650 MG: 325 TABLET, FILM COATED ORAL at 21:17

## 2024-02-11 RX ADMIN — POTASSIUM CHLORIDE 20 MEQ: 1500 TABLET, EXTENDED RELEASE ORAL at 08:29

## 2024-02-11 RX ADMIN — SPIRONOLACTONE 25 MG: 25 TABLET ORAL at 08:29

## 2024-02-11 RX ADMIN — BUDESONIDE AND FORMOTEROL FUMARATE DIHYDRATE 2 PUFF: 160; 4.5 AEROSOL RESPIRATORY (INHALATION) at 17:05

## 2024-02-11 RX ADMIN — PREGABALIN 50 MG: 50 CAPSULE ORAL at 21:17

## 2024-02-11 RX ADMIN — BUDESONIDE AND FORMOTEROL FUMARATE DIHYDRATE 2 PUFF: 160; 4.5 AEROSOL RESPIRATORY (INHALATION) at 08:32

## 2024-02-11 RX ADMIN — BUMETANIDE 6 MG: 2 TABLET ORAL at 08:29

## 2024-02-11 RX ADMIN — LORATADINE 10 MG: 10 TABLET ORAL at 08:29

## 2024-02-11 NOTE — PROGRESS NOTES
Progress Note - General Surgery  Mesfin Cano 58 y.o. male MRN: [id number]  Unit/Bed#: [facility] 521-01 Encounter: [id number]      Assessment:  58 y.o. male with gallstone pancreatitis, status post 2/7 laparoscopic IOC, lap assisted ERCP, open cholecystectomy. Afebrile, HDS     Drain: 15cc SS  UOP: 2550 cc    Plan:  - Low fat diet as tolerated  - Continue antibiotics  - Continue home meds  - PRN analgesia and antiemetics  - PT/OT  - Encourage OOB and ambulation  - On eliquis, covers DVT ppx.  - DC 2/12 once patient has support at home.        Subjective: No acute events overnight. Afebrile, hemodynamically stable. Tolerating diet. No nausea, or vomiting, fevers or chills. Pain controlled.        Objective:   Temp:  [97.6 °F (36.4 °C)-98.8 °F (37.1 °C)] 97.6 °F (36.4 °C)  HR:  [76-90] 76  Resp:  [16-18] 18  BP: (115-134)/(59-82) 120/71    Physical Exam:  General: No acute distress, alert and oriented  CV: Well perfused, regular rate and rhythm  Lungs: Normal work of breathing, no increased respiratory effort  Abdomen: Soft, appropriately-tender, non-distended. Incision(s) clean, dry and intact. CHONG drain in place and SS  Extremities: No edema, clubbing or cyanosis  Skin: Warm, dry      I/O         02/09 0701  02/10 0700 02/10 0701  02/11 0700 02/11 0701  02/12 0700    P.O. 1540 750     IV Piggyback  300     Total Intake(mL/kg) 1540 (9.8) 1050 (6.7)     Urine (mL/kg/hr) 4435 (1.2) 2550 (0.7)     Drains 30 15     Stool 0 0     Total Output 4465 2565     Net -2925 -1515            Unmeasured Urine Occurrence 2 x      Unmeasured Stool Occurrence 0 x 1 x             Lab, Imaging and other studies: I have personally reviewed pertinent reports.  , CBC with diff:   Lab Results   Component Value Date    WBC 6.04 02/11/2024    HGB 7.9 (L) 02/11/2024    HCT 26.7 (L) 02/11/2024    MCV 86 02/11/2024     02/11/2024    RBC 3.12 (L) 02/11/2024    MCH 25.3 (L) 02/11/2024    MCHC 29.6 (L) 02/11/2024    RDW 17.3 (H) 02/11/2024    MPV  11.6 02/11/2024    NRBC 0 02/11/2024   , BMP/CMP:   Lab Results   Component Value Date    SODIUM 137 02/11/2024    K 3.5 02/11/2024    CL 98 02/11/2024    CO2 28 02/11/2024    BUN 18 02/11/2024    CREATININE 1.58 (H) 02/11/2024    CALCIUM 8.1 (L) 02/11/2024    AST 25 02/11/2024    ALT 3 (L) 02/11/2024    ALKPHOS 329 (H) 02/11/2024    EGFR 48 02/11/2024           Paulino Brown MD  General Surgery   02/11/24

## 2024-02-12 ENCOUNTER — TRANSITIONAL CARE MANAGEMENT (OUTPATIENT)
Dept: FAMILY MEDICINE CLINIC | Facility: CLINIC | Age: 59
End: 2024-02-12

## 2024-02-12 ENCOUNTER — TELEPHONE (OUTPATIENT)
Dept: HEMATOLOGY ONCOLOGY | Facility: CLINIC | Age: 59
End: 2024-02-12

## 2024-02-12 VITALS
RESPIRATION RATE: 20 BRPM | OXYGEN SATURATION: 95 % | HEIGHT: 73 IN | TEMPERATURE: 97.6 F | HEART RATE: 104 BPM | WEIGHT: 315 LBS | DIASTOLIC BLOOD PRESSURE: 70 MMHG | SYSTOLIC BLOOD PRESSURE: 132 MMHG | BODY MASS INDEX: 41.75 KG/M2

## 2024-02-12 PROBLEM — K85.10 ACUTE GALLSTONE PANCREATITIS: Status: RESOLVED | Noted: 2024-02-05 | Resolved: 2024-02-12

## 2024-02-12 LAB
GLUCOSE SERPL-MCNC: 196 MG/DL (ref 65–140)
GLUCOSE SERPL-MCNC: 289 MG/DL (ref 65–140)

## 2024-02-12 PROCEDURE — 88304 TISSUE EXAM BY PATHOLOGIST: CPT | Performed by: PATHOLOGY

## 2024-02-12 PROCEDURE — 82948 REAGENT STRIP/BLOOD GLUCOSE: CPT

## 2024-02-12 PROCEDURE — 99024 POSTOP FOLLOW-UP VISIT: CPT | Performed by: SURGERY

## 2024-02-12 RX ORDER — AMOXICILLIN AND CLAVULANATE POTASSIUM 875; 125 MG/1; MG/1
1 TABLET, FILM COATED ORAL EVERY 12 HOURS SCHEDULED
Qty: 4 TABLET | Refills: 0 | Status: SHIPPED | OUTPATIENT
Start: 2024-02-12 | End: 2024-02-14

## 2024-02-12 RX ORDER — ACETAMINOPHEN 325 MG/1
650 TABLET ORAL EVERY 6 HOURS PRN
COMMUNITY
Start: 2024-02-12

## 2024-02-12 RX ADMIN — ATORVASTATIN CALCIUM 10 MG: 10 TABLET, FILM COATED ORAL at 08:27

## 2024-02-12 RX ADMIN — PANTOPRAZOLE SODIUM 40 MG: 40 TABLET, DELAYED RELEASE ORAL at 08:27

## 2024-02-12 RX ADMIN — INSULIN LISPRO 6 UNITS: 100 INJECTION, SOLUTION INTRAVENOUS; SUBCUTANEOUS at 11:38

## 2024-02-12 RX ADMIN — AMOXICILLIN AND CLAVULANATE POTASSIUM 1 TABLET: 875; 125 TABLET, FILM COATED ORAL at 08:28

## 2024-02-12 RX ADMIN — APIXABAN 5 MG: 5 TABLET, FILM COATED ORAL at 08:27

## 2024-02-12 RX ADMIN — UMECLIDINIUM 1 PUFF: 62.5 AEROSOL, POWDER ORAL at 08:29

## 2024-02-12 RX ADMIN — METOPROLOL SUCCINATE 50 MG: 50 TABLET, EXTENDED RELEASE ORAL at 08:26

## 2024-02-12 RX ADMIN — SPIRONOLACTONE 25 MG: 25 TABLET ORAL at 08:27

## 2024-02-12 RX ADMIN — BUMETANIDE 6 MG: 2 TABLET ORAL at 08:27

## 2024-02-12 RX ADMIN — BUDESONIDE AND FORMOTEROL FUMARATE DIHYDRATE 2 PUFF: 160; 4.5 AEROSOL RESPIRATORY (INHALATION) at 08:29

## 2024-02-12 RX ADMIN — FERROUS SULFATE TAB 325 MG (65 MG ELEMENTAL FE) 325 MG: 325 (65 FE) TAB at 08:27

## 2024-02-12 RX ADMIN — INSULIN LISPRO 2 UNITS: 100 INJECTION, SOLUTION INTRAVENOUS; SUBCUTANEOUS at 08:30

## 2024-02-12 RX ADMIN — LORATADINE 10 MG: 10 TABLET ORAL at 08:27

## 2024-02-12 NOTE — TELEPHONE ENCOUNTER
Appointment Change  Cancel, Reschedule, Change to Virtual      Who are you speaking with? Patient   If it is not the patient, is the caller listed on the communication consent form? N/A   Which provider is the appointment scheduled with? Dr. Cosby   When was the original appointment scheduled?    Please list date and time 2/13/24    At which location is the appointment scheduled to take place? [facility]   Was the appointment rescheduled?     Was the appointment changed from an in person visit to a virtual visit?    If so, please list the details of the change. 2/22/24  @ 9   What is the reason for the appointment change? Just had sx       Was STAR transport scheduled? No   Does STAR transport need to be scheduled for the new visit (if applicable) No   Does the patient need an infusion appointment rescheduled? No   Does the patient have an upcoming infusion appointment scheduled? If so, when? No   Is the patient undergoing chemotherapy? No   For appointments cancelled with less than 24 hours:  Was the no-show policy reviewed? N/A

## 2024-02-12 NOTE — CASE MANAGEMENT
Case Management Discharge Note    Patient name Mesfin Cano  Location [facility] 521/[facility] 521-01 MRN [id number]  : 1965 Date 2024       Current Admission Date: 2024  Current Admission Diagnosis:Abdominal pain      LOS (days): 6  Geometric Mean LOS (GMLOS) (days): 7.6  Days to GMLOS:1.5     OBJECTIVE:  Risk of Unplanned Readmission Score: 42.9   Current admission status: Inpatient   Primary Insurance: BLUE CROSS    DISCHARGE DETAILS:  Discharge planning discussed with:: Patient  Freedom of Choice: Yes  CM contacted family/caregiver?: Yes  Were Treatment Team discharge recommendations reviewed with patient/caregiver?: Yes  Did patient/caregiver verbalize understanding of patient care needs?: Yes  Were patient/caregiver advised of the risks associated with not following Treatment Team discharge recommendations?: Yes    Contacts  Patient Contacts: Karina Cano (pt's wife)  Relationship to Patient:: Family  Contact Method: Phone  Phone Number: [phone]  Reason/Outcome: Emergency Contact    Requested Home Health Care         Is the patient interested in HHC at discharge?: Yes  Home Health Discipline requested:: Nursing, Physical Therapy, Occupational Therapy  Home Health Agency Name:: [facility] Follow-Up Provider:: PCP  Home Health Services Needed:: Post-Op Care and Assessment, Evaluate Functional Status and Safety, Gait/ADL Training, Strengthening/Theraputic Exercises to Improve Function  Homebound Criteria Met:: Requires the Assistance of Another Person for Safe Ambulation or to Leave the Home  Supporting Clincal Findings:: Limited Endurance    Treatment Team Recommendation: Home with Home Health Care  Discharge Destination Plan:: Home with Home Health Care  Transport at Discharge : Family    Additional Comments: Pt is cleared for d/c by General Surgery resident Dr Paulino Brown. KERA Lane was notified of pt's d/c order. Pt is accepted for services by [facility] for his aftercare  plan. The pt and his wife Karina Cano were both informed of d/c. Family will transport pt home later this day; pickup time is TBD. No IMM required. No further CM needs.

## 2024-02-12 NOTE — NURSING NOTE
AVS reviewed with patient and spouse. IV removed. Pt being discharged with RUQ CHONG drain. Pt transported via wheelchair with spouse and RN to [facility].

## 2024-02-12 NOTE — DISCHARGE SUMMARY
Discharge Summary - Mesfin Cano 58 y.o. male MRN: [id number]    Unit/Bed#: [facility] 521-01 Encounter: [id number]    Admission Date:   Admission Orders (From admission, onward)       Ordered        02/06/24 0859  Inpatient Admission  Once            02/04/24 1650  Place in Observation  Once                            Admitting Diagnosis: Abdominal pain [R10.9]    HPI: Mesfin Cano is a 58 y.o. male who presents with abdominal pain. Patient reports that the pain started 2 days ago and is located across his upper abdomen. Denies inciting event or previous similar pain. Denies nausea, vomiting, or change in bowel habit. He also reports some subjective fevers. Upon presentation, he was found to be tachycardic and hypotensive. He was given 1L of crystalloid and now vitals are normal. Lab work showed hyperbilirubinemia, ANTONIO, and elevated lipase concerning for pancreatitis. General surgery consulted for suspected acute pancreatitis.     Procedures Performed:   Orders Placed This Encounter   Procedures    POC Biliary US    POC Cardiac US     2/7: Laparoscopic Intraoperative Cholangiogram. Laparoscopic transgastric access for ERCP. Converted to open for wedge gastrectomy / repair of gastrotomy. Open cholecystectomy  2/7: ERCP with sphincterotomy and stone extraction      Summary of Hospital Course: Patient was admitted for above listed diagnosis. He underwent above listed surgeries on 2/7. He tolerated the procedure well with no immediate post-operative complications. See operative note for more details. He had a surgical CHONG drain that was maintained post-operatively. He was continued on antibiotics for spillage of bile. He progressed well post-operatively. By 5 Days Post-Op he was tolerating a low fat diet, ambulating the halls, voiding spontaneously, and his pain was well controlled without the use of narcotics. He was examined on the morning of 2/12 and deemed stable for discharge home with drain in place with oral  antibiotics through 2/13.    Significant Findings, Care, Treatment and Services Provided:   FL ERCP biliary only   Final Result by Kirt Weldon MD (02/08 2103)      Choledocholithiasis status post removal of calculi following sphincterotomy and balloon sweeping.         Workstation performed: [id number]         XR cholangiogram intraoperative   Final Result by Kirt Weldon MD (02/08 0739)      Findings consistent with distal common bile duct obstruction as noted above. Please see procedure report for further details.            Workstation performed: [id number]         MRI abdomen wo contrast and mrcp   Final Result by Anival Medeiros MD (02/06 0907)      Gallstones.      5 mm low signal focus in the common duct is suspicious for a tiny common duct stone with perhaps a second small area of gravel. There is no common bile duct dilatation however.      No pancreatic duct dilatation. Scattered small cystic areas in the pancreas may represent small pseudocysts or side duct IPMNs. The pancreatic duct anatomy is not optimally seen and could be related to a prominent secondary duct in the pancreatic head.      Follow-up with contrast is suggested in 3 months time when the patient is no longer acute.      The study was marked in EPIC for immediate notification.      Workstation performed: [id number]         US right upper quadrant   Final Result by Yvonne Mendez MD (02/04 1831)      Cholelithiasis. No evidence of acute cholecystitis.      No intra or extrahepatic biliary ductal dilatation.      Moderate hepatic steatosis.      Workstation performed: [id number]         CT pe study w abdomen pelvis w contrast   Final Result by Annie Diego MD (02/04 1723)   No acute pulmonary emboli identified. No pulmonary infiltrates. Note made of CardioMEMS device, stable position.      Mild fluid stranding surrounding the pancreatic tail raising suspicion for pancreatitis.      Prior gastric  bypass surgery noted.      The study was marked in EPIC for immediate notification.         Workstation performed: [id number]         X-ray chest 1 view portable   Final Result by Adamskatiana Godinez DO (02/05 1058)      No acute cardiopulmonary disease.         Resident: CAMI ALBERTS I, the attending radiologist, have reviewed the images and agree with the final report above.      Workstation performed: [id number]               Complications: None    Discharge Diagnosis: Gallstone pancreatitis, Choledocholithiasis    Medical Problems       Resolved Problems  Date Reviewed: 1/30/2024            Resolved    * (Principal) Acute gallstone pancreatitis 2/12/2024     Resolved by  Paulino Brown MD          Condition at Discharge: stable         Discharge instructions/Information to patient and family:   See after visit summary for information provided to patient and family.      Provisions for Follow-Up Care:  See after visit summary for information related to follow-up care and any pertinent home health orders.      PCP: Soo Chavez MD    Disposition: See After Visit Summary for discharge disposition information.    Planned Readmission: No      Discharge Statement   I spent 15 minutes discharging the patient. This time was spent on the day of discharge. I had direct contact with the patient on the day of discharge. Additional documentation is required if more than 30 minutes were spent on discharge.     Discharge Medications:  See after visit summary for reconciled discharge medications provided to patient and family.

## 2024-02-13 ENCOUNTER — HOME CARE VISIT (OUTPATIENT)
Dept: HOME HEALTH SERVICES | Facility: HOME HEALTHCARE | Age: 59
End: 2024-02-13

## 2024-02-13 NOTE — UTILIZATION REVIEW
NOTIFICATION OF ADMISSION DISCHARGE   This is a Notification of Discharge from [facility]. Please be advised that this patient has been discharge from our facility. Below you will find the admission and discharge date and time including the patient’s disposition.   UTILIZATION REVIEW CONTACT:  Bayron Candelaria  Utilization   Network Utilization Review Department  Phone: [phone] x carefully listen to the prompts. All voicemails are confidential.  Email: [email or web address]     ADMISSION INFORMATION  PRESENTATION DATE: 2/4/2024  2:26 PM  OBERVATION ADMISSION DATE:   INPATIENT ADMISSION DATE: 2/6/24  8:59 AM   DISCHARGE DATE: 2/12/2024  1:13 PM   DISPOSITION:Home with Home Health Care    Network Utilization Review Department  ATTENTION: Please call with any questions or concerns to [phone] and carefully listen to the prompts so that you are directed to the right person. All voicemails are confidential.   For Discharge needs, contact Care Management DC Support Team at [phone] opt. 2  Send all requests for admission clinical reviews, approved or denied determinations and any other requests to dedicated fax number below belonging to the campus where the patient is receiving treatment. List of dedicated fax numbers for the Facilities:  FACILITY NAME UR FAX NUMBER   ADMISSION DENIALS (Administrative/Medical Necessity) [phone]   DISCHARGE SUPPORT TEAM ([facility]) [phone]   PARENT CHILD HEALTH (Maternity/NICU/Pediatrics) [phone]   [facility] [phone]   [facility] [phone]   [facility] [phone]   [facility] [phone]   [facility] [phone]   [facility] [phone]   [facility] [phone]   [facility]  [phone]   [facility] [phone]   [facility] [phone]   [facility] [phone]   [facility] [phone]

## 2024-02-16 ENCOUNTER — TELEPHONE (OUTPATIENT)
Dept: FAMILY MEDICINE CLINIC | Facility: CLINIC | Age: 59
End: 2024-02-16

## 2024-02-16 DIAGNOSIS — E87.6 DIURETIC-INDUCED HYPOKALEMIA: ICD-10-CM

## 2024-02-16 DIAGNOSIS — T50.2X5A DIURETIC-INDUCED HYPOKALEMIA: ICD-10-CM

## 2024-02-16 RX ORDER — POTASSIUM CHLORIDE 20 MEQ/1
40 TABLET, EXTENDED RELEASE ORAL 2 TIMES DAILY
Qty: 360 TABLET | Refills: 1 | Status: SHIPPED | OUTPATIENT
Start: 2024-02-16

## 2024-02-16 NOTE — TELEPHONE ENCOUNTER
I spoke to patient and scheduled TCM for 2/20/24. Patient reports intense pain in right leg. States he had notified hospital staff and was prescribed Tylenol but received not further tests or treatment. Patient states Tylenol helps take the edge off but pain is still present. States he is unable to sleep and has episodes of intense pain in right thigh. Please contact with recommendations.

## 2024-02-16 NOTE — TELEPHONE ENCOUNTER
L/M for pt with 's instructions to proceed to nearest ED/Urgent Care Center for evaluation ASAP. MYC message sent as well to pt.with MD instructions. Advised to call the office with any questions.

## 2024-02-17 ENCOUNTER — HOME CARE VISIT (OUTPATIENT)
Dept: HOME HEALTH SERVICES | Facility: HOME HEALTHCARE | Age: 59
End: 2024-02-17

## 2024-02-17 PROBLEM — Z90.49 STATUS POST CHOLECYSTECTOMY: Status: ACTIVE | Noted: 2024-02-17

## 2024-02-18 ENCOUNTER — HOME CARE VISIT (OUTPATIENT)
Dept: HOME HEALTH SERVICES | Facility: HOME HEALTHCARE | Age: 59
End: 2024-02-18

## 2024-02-18 NOTE — Clinical Note
Olvin Brasher,     Thank you for the heads up.    Genevieve   ----- Message -----  From: Sameera Mckeon RN  Sent: 2/18/2024   1:43 PM EST  To: Judith Lopes RN; Soo Chavez MD; *      2.18.24   as per rich akbar RN this pt declined all home care services

## 2024-02-19 ENCOUNTER — RA CDI HCC (OUTPATIENT)
Dept: OTHER | Facility: HOSPITAL | Age: 59
End: 2024-02-19

## 2024-02-20 ENCOUNTER — OFFICE VISIT (OUTPATIENT)
Dept: FAMILY MEDICINE CLINIC | Facility: CLINIC | Age: 59
End: 2024-02-20
Payer: COMMERCIAL

## 2024-02-20 VITALS
TEMPERATURE: 98 F | SYSTOLIC BLOOD PRESSURE: 124 MMHG | HEIGHT: 73 IN | OXYGEN SATURATION: 100 % | HEART RATE: 92 BPM | WEIGHT: 315 LBS | BODY MASS INDEX: 41.75 KG/M2 | RESPIRATION RATE: 18 BRPM | DIASTOLIC BLOOD PRESSURE: 70 MMHG

## 2024-02-20 DIAGNOSIS — E11.65 TYPE 2 DIABETES MELLITUS WITH HYPERGLYCEMIA, WITHOUT LONG-TERM CURRENT USE OF INSULIN (HCC): ICD-10-CM

## 2024-02-20 DIAGNOSIS — N18.32 STAGE 3B CHRONIC KIDNEY DISEASE (HCC): ICD-10-CM

## 2024-02-20 DIAGNOSIS — I48.0 PAROXYSMAL ATRIAL FIBRILLATION (HCC): Chronic | ICD-10-CM

## 2024-02-20 DIAGNOSIS — Z90.49 STATUS POST CHOLECYSTECTOMY: ICD-10-CM

## 2024-02-20 DIAGNOSIS — E66.01 MORBID OBESITY DUE TO EXCESS CALORIES (HCC): Chronic | ICD-10-CM

## 2024-02-20 DIAGNOSIS — I50.32 CHRONIC DIASTOLIC CONGESTIVE HEART FAILURE (HCC): ICD-10-CM

## 2024-02-20 DIAGNOSIS — M79.651 ACUTE PAIN OF RIGHT THIGH: ICD-10-CM

## 2024-02-20 DIAGNOSIS — I10 PRIMARY HYPERTENSION: ICD-10-CM

## 2024-02-20 DIAGNOSIS — K85.10 ACUTE GALLSTONE PANCREATITIS: Primary | ICD-10-CM

## 2024-02-20 PROBLEM — I50.33 ACUTE ON CHRONIC HEART FAILURE WITH PRESERVED EJECTION FRACTION (HFPEF) (HCC): Status: RESOLVED | Noted: 2021-11-12 | Resolved: 2024-02-20

## 2024-02-20 PROBLEM — J44.1 COPD WITH ACUTE EXACERBATION (HCC): Status: ACTIVE | Noted: 2024-02-20

## 2024-02-20 PROBLEM — J44.1 COPD WITH ACUTE EXACERBATION (HCC): Status: RESOLVED | Noted: 2024-02-20 | Resolved: 2024-02-20

## 2024-02-20 PROCEDURE — 99495 TRANSJ CARE MGMT MOD F2F 14D: CPT | Performed by: FAMILY MEDICINE

## 2024-02-20 NOTE — PROGRESS NOTES
Chief Complaint   Patient presents with    Transition of Care Management     Gall bladder removal      Health Maintenance   Topic Date Due    Pneumococcal Vaccine: Pediatrics (0 to 5 Years) and At-Risk Patients (6 to 64 Years) (1 of 2 - PCV) Never done    HIV Screening  Never done    Hepatitis A Vaccine (1 of 2 - Risk 2-dose series) Never done    Colorectal Cancer Screening  Never done    Zoster Vaccine (1 of 2) Never done    DM Eye Exam  11/04/2022    Annual Physical  06/16/2023    Influenza Vaccine (1) Never done    COVID-19 Vaccine (3 - 2023-24 season) 09/01/2023    Kidney Health Evaluation: Albumin/Creatinine Ratio  09/19/2023    Diabetic Foot Exam  12/20/2023    HEMOGLOBIN A1C  06/28/2024    Kidney Health Evaluation: GFR  02/11/2025    Depression Screening  02/20/2025    DTaP,Tdap,and Td Vaccines (2 - Td or Tdap) 06/09/2028    Hepatitis C Screening  Completed    HIB Vaccine  Aged Out    IPV Vaccine  Aged Out    Meningococcal ACWY Vaccine  Aged Out    HPV Vaccine  Aged Out       Assessment & Plan     Patient presents for TCM visit.    He was admitted to [facility] 2/4/24 - 2/12/24 for acute gallstone pancreatitis.    1. Acute gallstone pancreatitis  Assessment & Plan:  Patient presented to [facility] with abdominal pain.  S/P ERCP with sphincterectomy and stone extraction, open cholecystectomy.    Doing well post-op.    Reports no abdominal pain, nausea,vomiting, diarrhea.  Follow-up with general surgeon on 2/22/24.        2. Status post cholecystectomy  Assessment & Plan:  Patient is S/P cholecystectomy.  No post-op complications.  Follow-up with general surgeon for staples removal, post-op follow-up visit on 2/22/24.        3. Primary hypertension  Assessment & Plan:  /70.  Continue current medical regimen.    Follow-up with cardiology Dr. Wilcox next month.      4. Paroxysmal atrial fibrillation (HCC)  Assessment & Plan:  Denies heart palpitations.    Continue beta-blocker, anticoagulation  with Eliquis.  Follow-up with cardiology DR. Wilcox.        5. Chronic diastolic CHF (HCC)  Assessment & Plan:  Wt Readings from Last 3 Encounters:   02/20/24 (!) 151 kg (332 lb 12.8 oz)   02/10/24 (!) 157 kg (346 lb 5.5 oz)   01/30/24 (!) 152 kg (334 lb)     Patient is euvolemic on exam.    Continue Bumex, Entresto, Spironolactone, Metoprolol ER, Jardiance.    Continue to monitor daily weights.    Follow a low-sodium diet.    Follow-up with cardiology Dr. Wilcox in March.              6. Type 2 diabetes mellitus with hyperglycemia, without long-term current use of insulin (HCC)  Assessment & Plan:    Lab Results   Component Value Date    HGBA1C 9.3 (H) 12/28/2023     Continue Januvia 50 mg daily, Jardiance 10 mg daily.    Following low-carb diet, work on weight reduction.        7. Stage 3b chronic kidney disease (HCC)  Assessment & Plan:  Lab Results   Component Value Date    EGFR 48 02/11/2024    EGFR 43 02/10/2024    EGFR 39 02/09/2024    CREATININE 1.58 (H) 02/11/2024    CREATININE 1.73 (H) 02/10/2024    CREATININE 1.86 (H) 02/09/2024     Blood test done on February 11, 2024.  Creatinine 1.58 - at baseline.  GFR 48.  Avoid NSAIDs, nephrotoxins.    Schedule follow-up visit with nephrology Dr. Vyas.        8. Morbid obesity (HCC)  Assessment & Plan:  Continue working on dietary and lifestyle modifications, losing weight.      9. Acute pain of right thigh  Assessment & Plan:  Will order Venous Doppler R LE stat to r/o DVT.  Take Tylenol PRN for pain.  Call office if symptoms persist or worsen.      Orders:  -     VAS VENOUS DUPLEX -LOWER LIMB UNILATERAL; Future; Expected date: 02/20/2024        Depression Screening and Follow-up Plan: Patient was screened for depression during today's encounter. They screened negative with a PHQ-2 score of 0.      Schedule follow-up visit/physical exam in 3 months.      Subjective     Transitional Care Management Review:   Mesfin Cano is a 58 y.o. male here for TCM follow up.      During the TCM phone call patient stated:  TCM Call       Date and time call was made  2/14/2024 10:57 AM    Hospital care reviewed  Records reviewed    Patient was hospitialized at  [facility]    Date of Admission  02/04/24    Date of discharge  02/12/24    Diagnosis  Acute gallstone pancreatitis    Disposition  Home    Were the patients medications reviewed and updated  No    Current Symptoms  None    Shortness of breath severity  Moderate          TCM Call       Post hospital issues  None    Should patient be enrolled in anticoag monitoring?  No    Scheduled for follow up?  Yes    Patients specialists  Cardiologist    Cardiologist name  Bobby Betancur MD    Cardiologist contact #  [phone]    Did you obtain your prescribed medications  Yes    Do you need help managing your prescriptions or medications  No    Is transportation to your appointment needed  No    I have advised the patient to call PCP with any new or worsening symptoms  Annmarie Samuel     Living Arrangements  Family members    Support System  Family    The type of support provided  None    Do you have social support  Yes, some    Are you recieving any outpatient services  No    Are you recieving home care services  Yes    Types of home care services  Nurse visit; Home PT; Home health aid    Are you using any community resources  No    Current waiver services  No    Have you fallen in the last 12 months  No    Interperter language line needed  No    Counseling  Patient          HPI    Patient presents for TCM visit.    Patient was admitted to [facility] 2/4/24 - 2/12/24 for acute gallstone pancreatitis.    He underwent ERCP with sphincterectomy and stone extraction, open cholecystectomy.      No post-op complications.    Patient has CHONG drain.    He reports no abdominal pain, nausea, vomiting diarrhea.    Denies fever, chills. Completed antibiotic therapy with Augmentin.   Incisions are healing well.      Patient has  appointment scheduled with general surgeon for staples removal, post-op follow-up visit on 2/22/24.    Reviewed d/c medications, blood test results from February 11, 2024.    Potassium 3.5, creatinine 1.58 - at baseline. GFR 48.  Sodium 137.  Glucose 170.    AST 25 ,ALT 3, , total bilirubin 1.01.    Hemoglobin 7.9.      Patient has follow-up visit with hematology on 2/22/24.    C/o sharp, severe pain in right lateral thigh since hospital discharge.  Reports no injury.    No prior history of DVT.    HTN/ PAF - denies chest pain, dizziness, heart palpitations.    Currently on anticoagulation with Eliquis 5 mg twice daily.      Chronic diastolic CHF -  weight has been stable.    Reports mid exertional SOB.  No worsening leg edema.    Follow-up visit with cardiology Dr. Wilcox scheduled in March.    Type 2 DM - blood sugar at homes in 130's.    Continues on Januvia 50 mg daily, Jardiance 10 mg daily.      Review of Systems   Constitutional:  Positive for fatigue (mild). Negative for activity change, appetite change, chills and fever.   HENT:  Negative for congestion and sore throat.    Eyes: Negative.    Respiratory:  Positive for shortness of breath. Negative for cough (mild exertional SOB), chest tightness and wheezing.    Cardiovascular:  Negative for chest pain, palpitations and leg swelling.   Gastrointestinal:  Negative for abdominal pain, constipation, diarrhea, nausea and vomiting.   Genitourinary:  Negative for difficulty urinating, dysuria and hematuria.   Musculoskeletal:  Positive for arthralgias. Negative for gait problem and joint swelling.        Pain in right thigh   Skin:  Negative for rash.   Neurological:  Positive for numbness. Negative for dizziness, syncope and headaches. Light-headedness: in feet.  Hematological:  Bruises/bleeds easily.   Psychiatric/Behavioral:  Negative for dysphoric mood. The patient is not nervous/anxious.        Objective     /70 (BP Location: Left arm, Patient  "Position: Sitting, Cuff Size: Standard)   Pulse 92   Temp 98 °F (36.7 °C) (Tympanic)   Resp 18   Ht 6' 1\" (1.854 m)   Wt (!) 151 kg (332 lb 12.8 oz)   SpO2 100%   BMI 43.91 kg/m²      Physical Exam  Vitals and nursing note reviewed.   Constitutional:       Appearance: Normal appearance.      Comments: Morbidly obese   HENT:      Head: Normocephalic and atraumatic.   Eyes:      Conjunctiva/sclera: Conjunctivae normal.      Pupils: Pupils are equal, round, and reactive to light.   Neck:      Vascular: No carotid bruit.   Cardiovascular:      Rate and Rhythm: Regular rhythm. Tachycardia present.      Heart sounds: No murmur heard.  Pulmonary:      Effort: Pulmonary effort is normal.      Breath sounds: Normal breath sounds.   Abdominal:      General: There is no distension.      Palpations: Abdomen is soft.      Tenderness: There is no abdominal tenderness.      Comments: CHONG drain in place, filled with serosanguineous fluid   Musculoskeletal:         General: Normal range of motion.      Cervical back: Normal range of motion and neck supple.      Right lower leg: No edema.      Left lower leg: No edema.      Comments: Tenderness over the lateral aspect right thigh    Surgical incisions are healing. No surrounding erythema.  Staples in place.     Skin:     Findings: No rash.   Neurological:      Mental Status: He is alert.   Psychiatric:         Mood and Affect: Mood normal.       Medications have been reviewed by provider in current encounter    Soo Chavez MD         "

## 2024-02-21 NOTE — ASSESSMENT & PLAN NOTE
Patient presented to [facility] with abdominal pain.  S/P ERCP with sphincterectomy and stone extraction, open cholecystectomy.    Doing well post-op.    Reports no abdominal pain, nausea,vomiting, diarrhea.  Follow-up with general surgeon on 2/22/24.

## 2024-02-21 NOTE — ASSESSMENT & PLAN NOTE
Wt Readings from Last 3 Encounters:   02/20/24 (!) 151 kg (332 lb 12.8 oz)   02/10/24 (!) 157 kg (346 lb 5.5 oz)   01/30/24 (!) 152 kg (334 lb)     Patient is euvolemic on exam.    Continue Bumex, Entresto, Spironolactone, Metoprolol ER, Jardiance.    Continue to monitor daily weights.    Follow a low-sodium diet.    Follow-up with cardiology Dr. Wilcox in March.

## 2024-02-21 NOTE — ASSESSMENT & PLAN NOTE
Patient is S/P cholecystectomy.  No post-op complications.  Follow-up with general surgeon for staples removal, post-op follow-up visit on 2/22/24.

## 2024-02-21 NOTE — ASSESSMENT & PLAN NOTE
Lab Results   Component Value Date    EGFR 48 02/11/2024    EGFR 43 02/10/2024    EGFR 39 02/09/2024    CREATININE 1.58 (H) 02/11/2024    CREATININE 1.73 (H) 02/10/2024    CREATININE 1.86 (H) 02/09/2024     Blood test done on February 11, 2024.  Creatinine 1.58 - at baseline.  GFR 48.  Avoid NSAIDs, nephrotoxins.    Schedule follow-up visit with nephrology Dr. Vyas.

## 2024-02-21 NOTE — ASSESSMENT & PLAN NOTE
Denies heart palpitations.    Continue beta-blocker, anticoagulation with Eliquis.  Follow-up with cardiology DR. Wilcox.

## 2024-02-21 NOTE — ASSESSMENT & PLAN NOTE
Lab Results   Component Value Date    HGBA1C 9.3 (H) 12/28/2023     Continue Januvia 50 mg daily, Jardiance 10 mg daily.    Following low-carb diet, work on weight reduction.

## 2024-02-21 NOTE — ASSESSMENT & PLAN NOTE
Will order Venous Doppler R LE stat to r/o DVT.  Take Tylenol PRN for pain.  Call office if symptoms persist or worsen.

## 2024-02-22 ENCOUNTER — TELEPHONE (OUTPATIENT)
Dept: HEMATOLOGY ONCOLOGY | Facility: CLINIC | Age: 59
End: 2024-02-22

## 2024-02-22 ENCOUNTER — OFFICE VISIT (OUTPATIENT)
Dept: SURGERY | Facility: CLINIC | Age: 59
End: 2024-02-22

## 2024-02-22 ENCOUNTER — HOSPITAL ENCOUNTER (OUTPATIENT)
Dept: NON INVASIVE DIAGNOSTICS | Facility: HOSPITAL | Age: 59
Discharge: HOME | End: 2024-02-22
Payer: COMMERCIAL

## 2024-02-22 DIAGNOSIS — K80.50 CHOLEDOCHOLITHIASIS: ICD-10-CM

## 2024-02-22 DIAGNOSIS — K80.20 CHOLELITHIASIS: Primary | ICD-10-CM

## 2024-02-22 DIAGNOSIS — M79.651 ACUTE PAIN OF RIGHT THIGH: ICD-10-CM

## 2024-02-22 PROCEDURE — 99024 POSTOP FOLLOW-UP VISIT: CPT | Performed by: STUDENT IN AN ORGANIZED HEALTH CARE EDUCATION/TRAINING PROGRAM

## 2024-02-22 PROCEDURE — 93971 EXTREMITY STUDY: CPT | Performed by: SURGERY

## 2024-02-22 PROCEDURE — 93971 EXTREMITY STUDY: CPT

## 2024-02-22 NOTE — TELEPHONE ENCOUNTER
Patient missed his appointment with Dr. Cosby today.  I left voicemail to call back to reschedule.

## 2024-02-22 NOTE — PROGRESS NOTES
Office Visit - General Surgery  Mesfin Cano MRN: [id number]  Encounter: [id number]  Assessment/Plan    Problem List Items Addressed This Visit    None  Visit Diagnoses       Cholelithiasis    -  Primary    Choledocholithiasis            -Progressing well status post laparoscopic cholecystectomy and transgastric access for ERCP  -CHONG removed without difficulty.  Staples also removed.  Steri-Strips applied.  -Pathology reviewed  -Encourage patient to continue no heavy lifting, pushing, pulling over 20 pounds for the next 4 weeks to prevent hernia.  -May follow-up in surgery clinic as needed    Subjective    Mesfin Cano is a 58 y.o. male who presents for a postop visit status post  Laparoscopic cholecystectomy and transgastric access for ERCP 2/7/2024.  ERCP was performed with a sphincterotomy.  Overall patient states he has been doing well.  He has been tolerating a diet, having bowel function and his pain has been controlled.  He has a CHONG in place that has remained serous and has had less than 10 mL output for the last week.    Pt  has a past medical history of Acute gastritis without hemorrhage, Asthma, Atrial fibrillation ([facility]), Bilateral leg edema (02/19/2020), CHF (congestive heart failure) ([facility]), Coronary artery disease, Daytime sleepiness (07/17/2019), Diabetes mellitus ([facility]), Dyspnea on exertion (10/11/2021), Edema of both feet (01/23/2020), Gastric bypass status for obesity, Hyperlipidemia, Hypertension, Hypokalemia (11/14/2021), Impaired fasting glucose, Knee sprain, bilateral (06/14/2018), Leg cramps (06/16/2022), Obesity, and Viral gastroenteritis.    Pt  has a past surgical history that includes Gastric bypass (2004); Hernia repair; Carpal tunnel release; Tonsillectomy; Cardiac catheterization (N/A, 3/9/2022); Cardiac catheterization (N/A, 9/6/2022); Cardiac catheterization (N/A, 9/6/2022); Cardiac catheterization (N/A, 10/11/2023); IR biopsy bone marrow (12/14/2023); CHOLECYSTECTOMY LAPAROSCOPIC (N/A,  2/7/2024); LAPAROSCOPIC TRANSGASTRIC ACCESS FOR ERCP (N/A, 2/7/2024); and LAPAROSCOPIC TRANSGASTRIC ACCESS FOR ERCP (N/A, 2/7/2024).    family history includes COPD in his father; Cancer in his father; Hypertension in his father; Stroke in his mother.    Mesfin Cano reports that he quit smoking about 3 years ago. His smoking use included pipe and cigars. He started smoking about 8 years ago. He has never been exposed to tobacco smoke. He has never used smokeless tobacco. He reports current alcohol use of about 2.0 standard drinks of alcohol per week. He reports that he does not use drugs.      Current Outpatient Medications on File Prior to Visit   Medication Sig Dispense Refill    Accu-Chek FastClix Lancets MISC Test blood sugar twice daily 200 each 3    acetaminophen (TYLENOL) 325 mg tablet Take 2 tablets (650 mg total) by mouth every 6 (six) hours as needed for mild pain, headaches or fever      albuterol (PROVENTIL HFA,VENTOLIN HFA) 90 mcg/act inhaler Inhale 2 puffs every 4 (four) hours as needed for wheezing or shortness of breath 18 g 5    apixaban (Eliquis) 5 mg Take 1 tablet (5 mg total) by mouth 2 (two) times a day 60 tablet 5    atorvastatin (LIPITOR) 10 mg tablet TAKE 1 TABLET BY MOUTH EVERY DAY 90 tablet 3    Blood Glucose Monitoring Suppl (Accu-Chek Guide) w/Device KIT Use 2 (two) times a day Test blood sugar twice daily 1 kit 0    budesonide-formoterol (Symbicort) 160-4.5 mcg/act inhaler Inhale 2 puffs 2 (two) times a day Rinse mouth after use. 30.6 g 2    bumetanide (BUMEX) 2 mg tablet Take 3 tablets (6 mg total) by mouth 2 (two) times a day 540 tablet 1    Empagliflozin (JARDIANCE) 10 MG TABS tablet Take 1 tablet (10 mg total) by mouth daily 30 tablet 11    EPINEPHrine (EPIPEN) 0.3 mg/0.3 mL SOAJ Inject 0.3 mL (0.3 mg total) into a muscle once for 1 dose 0.6 mL 0    fluticasone (FLONASE) 50 mcg/act nasal spray 1 spray into each nostril 2 (two) times a day  0    loratadine (CLARITIN) 10 mg tablet  Take 1 tablet (10 mg total) by mouth daily Do not start before October 13, 2023.  0    metolazone (ZAROXOLYN) 2.5 mg tablet Take 2 tablets (5 mg total) by mouth if needed (weight gain of 3+ lbs in 24 hours, 5+ lbs in one week or signs of worsening fluid retention) Take 20-30 minutes before Bumex dose and with additional potassium      metoprolol succinate (TOPROL-XL) 50 mg 24 hr tablet Take 1 tablet (50 mg total) by mouth daily 90 tablet 0    montelukast (SINGULAIR) 10 mg tablet Take 1 tablet (10 mg total) by mouth daily at bedtime 90 tablet 1    pantoprazole (PROTONIX) 40 mg tablet Take 1 tablet (40 mg total) by mouth daily 21 tablet 0    potassium chloride (Klor-Con M20) 20 mEq tablet Take 2 tablets (40 mEq total) by mouth 2 (two) times a day 360 tablet 1    pregabalin (LYRICA) 50 mg capsule Take 1 caps. at bedtime 30 capsule 5    sacubitril-valsartan (Entresto) 24-26 MG TABS Take 1 tablet by mouth 2 (two) times a day 60 tablet 0    sitaGLIPtin (Januvia) 100 mg tablet TAKE 1/2 TABLET DAILY (Patient taking differently: 50 mg daily TAKE 1/2 TABLET DAILY) 15 tablet 5    spironolactone (ALDACTONE) 25 mg tablet Take 1 tablet (25 mg total) by mouth daily 90 tablet 0    tiotropium (Spiriva Respimat) 1.25 MCG/ACT AERS inhaler Inhale 2 puffs daily 4 g 0     No current facility-administered medications on file prior to visit.     Allergies   Allergen Reactions    Azithromycin Other (See Comments)     Shaky, uneasy feeling     Bactrim [Sulfamethoxazole-Trimethoprim] Other (See Comments)     shakiness       Review of Systems   Constitutional: Negative.    Respiratory: Negative.     Cardiovascular: Negative.    Gastrointestinal: Negative.    Musculoskeletal: Negative.    Skin: Negative.    Neurological: Negative.        Objective    There were no vitals filed for this visit.    Physical Exam  Constitutional:       Appearance: Normal appearance. He is obese.   Pulmonary:      Effort: Pulmonary effort is normal.   Abdominal:       Comments: Well-healing laparoscopic incision sites.  Transverse upper abdominal incision secured with staples, clean dry and intact without erythema or drainage.  CHONG in the right upper quadrant with minimal serous output.  Appropriate incisional tenderness.   Musculoskeletal:         General: Normal range of motion.   Skin:     General: Skin is warm and dry.   Neurological:      General: No focal deficit present.      Mental Status: He is alert.   Psychiatric:         Mood and Affect: Mood normal.

## 2024-02-29 ENCOUNTER — TELEPHONE (OUTPATIENT)
Dept: CARDIOLOGY CLINIC | Facility: CLINIC | Age: 59
End: 2024-02-29

## 2024-02-29 NOTE — TELEPHONE ENCOUNTER
Called patient to F/U regarding blank Cardiomems since 2/2/24. Hospitalized at [facility]  2/4 - 2/12.    Left a message to return call to either Brenna or myself.

## 2024-03-01 ENCOUNTER — CLINICAL SUPPORT (OUTPATIENT)
Dept: CARDIOLOGY CLINIC | Facility: CLINIC | Age: 59
End: 2024-03-01
Payer: COMMERCIAL

## 2024-03-01 DIAGNOSIS — I50.32 CHRONIC HEART FAILURE WITH PRESERVED EJECTION FRACTION (HCC): Primary | ICD-10-CM

## 2024-03-01 PROCEDURE — 93264 REM MNTR WRLS P-ART PRS SNR: CPT | Performed by: INTERNAL MEDICINE

## 2024-03-08 ENCOUNTER — TELEPHONE (OUTPATIENT)
Dept: CARDIOLOGY CLINIC | Facility: CLINIC | Age: 59
End: 2024-03-08

## 2024-03-18 DIAGNOSIS — T50.2X5A DIURETIC-INDUCED HYPOKALEMIA: ICD-10-CM

## 2024-03-18 DIAGNOSIS — E87.6 DIURETIC-INDUCED HYPOKALEMIA: ICD-10-CM

## 2024-03-18 RX ORDER — POTASSIUM CHLORIDE 1500 MG/1
40 TABLET, EXTENDED RELEASE ORAL 2 TIMES DAILY
Qty: 360 TABLET | Refills: 2 | Status: SHIPPED | OUTPATIENT
Start: 2024-03-18

## 2024-03-22 ENCOUNTER — TELEPHONE (OUTPATIENT)
Dept: CARDIOLOGY CLINIC | Facility: CLINIC | Age: 59
End: 2024-03-22

## 2024-03-22 NOTE — TELEPHONE ENCOUNTER
F/U call to patient & left a message to return call to nurse regarding no readings on his Cardiomemes since 2/3/24.

## 2024-04-01 ENCOUNTER — CLINICAL SUPPORT (OUTPATIENT)
Dept: CARDIOLOGY CLINIC | Facility: CLINIC | Age: 59
End: 2024-04-01
Payer: COMMERCIAL

## 2024-04-01 ENCOUNTER — TELEPHONE (OUTPATIENT)
Dept: CARDIOLOGY CLINIC | Facility: CLINIC | Age: 59
End: 2024-04-01

## 2024-04-01 DIAGNOSIS — I50.32 CHRONIC HEART FAILURE WITH PRESERVED EJECTION FRACTION (HCC): Primary | ICD-10-CM

## 2024-04-01 PROCEDURE — 93264 REM MNTR WRLS P-ART PRS SNR: CPT | Performed by: INTERNAL MEDICINE

## 2024-04-01 NOTE — TELEPHONE ENCOUNTER
Patient returned call & stated he has a current stomach bug & was canceling his OV with Dr Wilcox for today. Stated he will call to reschedule.    Also stated he has not done Cardiomeme's due to Open Cholecystectomy, wedge gastrectomy/repair of gastrotomy performed 2/7/24. Stated he has a large abdominal incision & he could not lay flat to do Cardiomeme's.    Stated he has been doing core strengthening exercised & will try doing Cardiomeme's, starting tomorrow

## 2024-04-01 NOTE — TELEPHONE ENCOUNTER
F/U call to patient. Left a message on both his & his wife Negra's phone for Keon to return call to either Brenna Vizcarra RN or to me regarding patient's Cardiomeme's.

## 2024-04-03 DIAGNOSIS — J45.41 MODERATE PERSISTENT ASTHMA WITH ACUTE EXACERBATION: ICD-10-CM

## 2024-04-03 RX ORDER — TIOTROPIUM BROMIDE INHALATION SPRAY 1.56 UG/1
2 SPRAY, METERED RESPIRATORY (INHALATION) DAILY
Qty: 4 G | Refills: 0 | Status: SHIPPED | OUTPATIENT
Start: 2024-04-03 | End: 2024-05-03

## 2024-04-11 DIAGNOSIS — I50.32 CHRONIC HEART FAILURE WITH PRESERVED EJECTION FRACTION (HFPEF) (HCC): ICD-10-CM

## 2024-04-11 RX ORDER — SPIRONOLACTONE 25 MG/1
25 TABLET ORAL DAILY
Qty: 30 TABLET | Refills: 5 | Status: SHIPPED | OUTPATIENT
Start: 2024-04-11

## 2024-04-11 NOTE — PROGRESS NOTES
Pt did not have any documented for the month of March. Pt stated due to his surgery, he could not lie flat to do Cardiomems reading.

## 2024-04-15 ENCOUNTER — TELEPHONE (OUTPATIENT)
Dept: CARDIOLOGY CLINIC | Facility: CLINIC | Age: 59
End: 2024-04-15

## 2024-05-01 ENCOUNTER — CLINICAL SUPPORT (OUTPATIENT)
Dept: CARDIOLOGY CLINIC | Facility: CLINIC | Age: 59
End: 2024-05-01
Payer: COMMERCIAL

## 2024-05-01 DIAGNOSIS — I50.32 CHRONIC DIASTOLIC CONGESTIVE HEART FAILURE (HCC): Primary | ICD-10-CM

## 2024-05-01 PROCEDURE — 93264 REM MNTR WRLS P-ART PRS SNR: CPT | Performed by: INTERNAL MEDICINE

## 2024-05-10 ENCOUNTER — TELEPHONE (OUTPATIENT)
Dept: CARDIOLOGY CLINIC | Facility: CLINIC | Age: 59
End: 2024-05-10

## 2024-05-10 DIAGNOSIS — I50.32 CHRONIC HEART FAILURE WITH PRESERVED EJECTION FRACTION (HFPEF) (HCC): ICD-10-CM

## 2024-05-10 RX ORDER — SPIRONOLACTONE 25 MG/1
25 TABLET ORAL DAILY
Qty: 90 TABLET | Refills: 1 | Status: SHIPPED | OUTPATIENT
Start: 2024-05-10

## 2024-05-10 NOTE — TELEPHONE ENCOUNTER
F/U call to patient regarding cardio Sherri's /not using pillow. Requested patient return call to nurse, leaving c/b number.

## 2024-05-13 ENCOUNTER — TELEPHONE (OUTPATIENT)
Dept: CARDIOLOGY CLINIC | Facility: CLINIC | Age: 59
End: 2024-05-13

## 2024-05-13 NOTE — TELEPHONE ENCOUNTER
Patient returned call stating he used his pillow this AM & is returning to using the pillow daily, returning cardio joanna.    Stated he is doing well, feeling so much better since Cholecystectomy on 2/7/24.  Stated he continues with exertional dyspnea.    Using a cane to ambulate. Stated he has an OV with PCP on 5/16 for disability application.    Requested F/U OV with Dr Wilcox since he had to cancel his last scheduled due to surgery.

## 2024-05-14 DIAGNOSIS — E11.22 TYPE 2 DIABETES MELLITUS WITH STAGE 3B CHRONIC KIDNEY DISEASE, WITHOUT LONG-TERM CURRENT USE OF INSULIN (HCC): ICD-10-CM

## 2024-05-14 DIAGNOSIS — N18.32 TYPE 2 DIABETES MELLITUS WITH STAGE 3B CHRONIC KIDNEY DISEASE, WITHOUT LONG-TERM CURRENT USE OF INSULIN (HCC): ICD-10-CM

## 2024-05-20 PROBLEM — M79.651 ACUTE PAIN OF RIGHT THIGH: Status: RESOLVED | Noted: 2024-02-20 | Resolved: 2024-05-20

## 2024-05-23 ENCOUNTER — TELEPHONE (OUTPATIENT)
Dept: CARDIOLOGY CLINIC | Facility: CLINIC | Age: 59
End: 2024-05-23

## 2024-05-23 NOTE — TELEPHONE ENCOUNTER
Cardio joanna were restarted  on 5/13 after a long break after Cholecystectomy on 2/7.    5/13 - 10  5/15 - 15  5/16 - 14 5/17 - 16  None until   5/22 - 15  Which was the last reading.    I will call tomorrow.

## 2024-05-23 NOTE — TELEPHONE ENCOUNTER
----- Message from Aster García PA-C sent at 5/23/2024 10:50 AM EDT -----  Regarding: CardioMEMS  Seeing Keon tomorrow AM.     Got any recent MEMs readings from this week I can review?     Thanks!   Aster

## 2024-05-23 NOTE — PROGRESS NOTES
Advanced Heart Failure / Pulmonary Hypertension Outpatient Progress Note    Mesfin Cano 59 y.o. male   MRN: [id number]  Encounter: [id number]    Assessment:  Patient Active Problem List    Diagnosis Date Noted    Acute gallstone pancreatitis 02/05/2024    Diabetic neuropathy (HCC) 12/23/2023    Eosinophilia 10/18/2023    Anemia due to chronic kidney disease 10/16/2023    Nasal congestion 10/06/2023    Loose stools 04/17/2023    Chronic diastolic CHF (HCC) 04/10/2023    Diabetic polyneuropathy associated with type 2 diabetes mellitus (HCC) 12/20/2022    CKD (chronic kidney disease) stage 3 (HCC) 09/16/2022    Paroxysmal atrial fibrillation (HCC) 03/01/2022    Severe persistent asthma 10/11/2021    Hyponatremia 07/31/2021    Well adult exam 05/14/2021    HNP (herniated nucleus pulposus), lumbar 02/23/2021    Foraminal stenosis of lumbar region 02/23/2021    VICKY (obstructive sleep apnea)     Hyperlipidemia 04/18/2019    Primary osteoarthritis of both knees 06/14/2018    Irritable bowel syndrome with diarrhea 01/30/2018    Primary hypertension 01/30/2018    Type 2 diabetes mellitus with hyperglycemia, without long-term current use of insulin ([facility]) 01/30/2018    Vitamin B12 deficiency 03/08/2016    Vitamin D deficiency 03/08/2016    Morbid obesity ([facility]) 02/23/2016    Primary osteoarthritis of right knee 10/15/2013    Status post cholecystectomy 02/17/2024    Presence of CardioMEMS HF system 03/09/2022       Today's Plan:  Well compensated on exam today. CardioMEMS PAd reading of 15 on 05/22 (goal of 15-18). Continue current diuretic dosing.   Has not been taking Entresto since 01/2024 due to cost ($175/month). Removed medication from med list. Will ask office to look into this.  Provided patient with Jardiance samples in office today.     Plan:  Chronic HFpEF; LVEF 55%; LVIDd 6.0 cm   Etiology: Afib, HTN, obesity phenotype.   TTE 11/21/2021: LVEF 55%. LVIDd 6.0 cm. Grade 1 DD. Normal RV. Trace TR.     LHC 09/06/2022: no  "obstructive CAD.   TTE 04/11/2023: LVEF 50%. LVIDd 6.6 cm. Grade 2 DD. Normal RV. Trace MR and TR. Normal IVC.    RHC / MEMS calibration 10/11/2023: CardioMEMS data correlates to RHC.    Weight of 334 lbs on 01/30. Today, weighs 331 lbs.    Most recent BMP from 02/11/2024: sodium 137; potassium 3.5; BUN 18; creatinine 1.58; eGFR 48.    Pharmacotherapies:  --ARNi / ACEi / ARB: none.   --Aldosterone Antagonist: spironolactone 25 mg daily.  --SGLT2 Inhibitor: empagliflozin 10 mg daily.  --Diuretic: Bumex 6 mg BID with potassium 40 mEq BID with PRN metolazone 5 mg.      Advanced Therapies:   --CardioMEMS: implanted 03/09/2022. Goal PAd of 15-18.    Atrial fibrillation, parosxysmal   [id number] = 3 (HF, HTN, DM).   Anticoagulation on Eliquis.    Rate control: metoprolol succinate 50 mg daily.   Rhythm control: No.    Hypertension   BP of 136/80 mmHg in office today.   Continues on medications as above.     Chronic kidney disease, stage IIIb   Baseline creatinine of 1.5-2.0.   Most recent BMP from 02/11/2024: sodium 137; potassium 3.5; BUN 18; creatinine 1.58; eGFR 48.    Hyperlipidemia  Asthma, severe persistent: follows with Dr. Noonan as outpatient.   Obstructive sleep apnea: without CPAP and equipment for >12 months. In process of getting new equipment.   Diabetes mellitus, type II  History of gastric bypass (Samuel-en-Y) surgery   History of tobacco abuse    HPI:   Mesfin Cano is a 58-year-old man with a PMH as above who presents to the office for hospital follow-up. Follows with Dr. Wilcox.     01/30/2024 with DC: \"Here for follow-up.  Reports overall doing well.  Took metolazone this morning 5 mg weight up 3 lbs from yesterday.  PAD this morning when CardioMEMS 12 mmHg.  Recently between 11-13, goal 15.  No worsening leg swelling or abdominal distention.  No worsening shortness of breath on exertion.  Adherent to medications.  Still working on getting CPAP. \"    Admitted to [facility] in February for acute " gallstone pancreatitis. Underwent open cholecystectomy with gastric repair.     05/24/2024: Patient presents for follow-up. No current cardiac complaints. Continues with same TELLES with stairs and longer distances. Denies worsening LE swelling or abdominal bloating. CardioMEMS PAd reading of 15 on 05/22. Has not been taking Entresto since 01/2024 due to cost ($175/month). Has not required PRN metolazone in 2-3 months.     Past Medical History:   Diagnosis Date    Acute gastritis without hemorrhage     last assessed 3/24/17    Asthma     Atrial fibrillation (HCC)     Bilateral leg edema 02/19/2020    CHF (congestive heart failure) ([facility])     Coronary artery disease     Daytime sleepiness 07/17/2019    Diabetes mellitus ([facility])     Dyspnea on exertion 10/11/2021    Edema of both feet 01/23/2020    Gastric bypass status for obesity     Hyperlipidemia     Hypertension     Hypokalemia 11/14/2021    Impaired fasting glucose     last assessed 5/10/17    Knee sprain, bilateral 06/14/2018    Leg cramps 06/16/2022    Obesity     Viral gastroenteritis     last assessed 11/4/16       Review of Systems   Constitutional:  Negative for activity change, appetite change, fatigue and unexpected weight change.   Respiratory:  Positive for shortness of breath (at baseline). Negative for cough and chest tightness.    Cardiovascular:  Negative for chest pain, palpitations and leg swelling.   Gastrointestinal:  Negative for abdominal distention, abdominal pain, diarrhea and nausea.   Genitourinary:  Negative for decreased urine volume, dysuria and urgency.   Musculoskeletal: Negative.    Skin: Negative.    Neurological:  Negative for dizziness, syncope, weakness and light-headedness.   Psychiatric/Behavioral:  Negative for confusion. The patient is not nervous/anxious.          Allergies   Allergen Reactions    Azithromycin Other (See Comments)     Shaky, uneasy feeling     Bactrim [Sulfamethoxazole-Trimethoprim] Other (See Comments)      deion         Current Outpatient Medications:     acetaminophen (TYLENOL) 325 mg tablet, Take 2 tablets (650 mg total) by mouth every 6 (six) hours as needed for mild pain, headaches or fever, Disp: , Rfl:     albuterol (PROVENTIL HFA,VENTOLIN HFA) 90 mcg/act inhaler, Inhale 2 puffs every 4 (four) hours as needed for wheezing or shortness of breath, Disp: 18 g, Rfl: 5    apixaban (Eliquis) 5 mg, Take 1 tablet (5 mg total) by mouth 2 (two) times a day, Disp: 60 tablet, Rfl: 5    atorvastatin (LIPITOR) 10 mg tablet, TAKE 1 TABLET BY MOUTH EVERY DAY, Disp: 90 tablet, Rfl: 3    budesonide-formoterol (Symbicort) 160-4.5 mcg/act inhaler, Inhale 2 puffs 2 (two) times a day Rinse mouth after use., Disp: 30.6 g, Rfl: 2    bumetanide (BUMEX) 2 mg tablet, Take 3 tablets (6 mg total) by mouth 2 (two) times a day, Disp: 540 tablet, Rfl: 1    Empagliflozin (JARDIANCE) 10 MG TABS tablet, Take 1 tablet (10 mg total) by mouth daily, Disp: 30 tablet, Rfl: 11    fluticasone (FLONASE) 50 mcg/act nasal spray, 1 spray into each nostril 2 (two) times a day, Disp: , Rfl: 0    Klor-Con M20 20 MEQ tablet, TAKE 2 TABLETS BY MOUTH 2 TIMES A DAY., Disp: 360 tablet, Rfl: 2    loratadine (CLARITIN) 10 mg tablet, Take 1 tablet (10 mg total) by mouth daily Do not start before October 13, 2023., Disp: , Rfl: 0    metolazone (ZAROXOLYN) 2.5 mg tablet, Take 2 tablets (5 mg total) by mouth if needed (weight gain of 3+ lbs in 24 hours, 5+ lbs in one week or signs of worsening fluid retention) Take 20-30 minutes before Bumex dose and with additional potassium, Disp: , Rfl:     metoprolol succinate (TOPROL-XL) 50 mg 24 hr tablet, Take 1 tablet (50 mg total) by mouth daily, Disp: 90 tablet, Rfl: 0    montelukast (SINGULAIR) 10 mg tablet, Take 1 tablet (10 mg total) by mouth daily at bedtime, Disp: 90 tablet, Rfl: 1    pantoprazole (PROTONIX) 40 mg tablet, Take 1 tablet (40 mg total) by mouth daily, Disp: 21 tablet, Rfl: 0    pregabalin (LYRICA) 50 mg  capsule, Take 1 caps. at bedtime, Disp: 30 capsule, Rfl: 5    sitaGLIPtin (Januvia) 100 mg tablet, TAKE 1/2 TABLET BY MOUTH EVERY DAY, Disp: 15 tablet, Rfl: 5    spironolactone (ALDACTONE) 25 mg tablet, TAKE 1 TABLET (25 MG TOTAL) BY MOUTH DAILY., Disp: 90 tablet, Rfl: 1    tiotropium (Spiriva Respimat) 1.25 MCG/ACT AERS inhaler, Inhale 2 puffs daily, Disp: 4 g, Rfl: 0    Accu-Chek FastClix Lancets MISC, Test blood sugar twice daily, Disp: 200 each, Rfl: 3    Blood Glucose Monitoring Suppl (Accu-Chek Guide) w/Device KIT, Use 2 (two) times a day Test blood sugar twice daily, Disp: 1 kit, Rfl: 0    EPINEPHrine (EPIPEN) 0.3 mg/0.3 mL SOAJ, Inject 0.3 mL (0.3 mg total) into a muscle once for 1 dose, Disp: 0.6 mL, Rfl: 0    Social History     Socioeconomic History    Marital status: /Civil Union     Spouse name: Not on file    Number of children: Not on file    Years of education: Not on file    Highest education level: Not on file   Occupational History    Not on file   Tobacco Use    Smoking status: Former     Types: Pipe, Cigars     Start date: 2016     Quit date: 1/1/2021     Years since quitting: 3.3     Passive exposure: Never    Smokeless tobacco: Never    Tobacco comments:     cigar once a day   Vaping Use    Vaping status: Never Used   Substance and Sexual Activity    Alcohol use: Yes     Alcohol/week: 2.0 standard drinks of alcohol     Types: 2 Shots of liquor per week     Comment: social    Drug use: No    Sexual activity: Yes     Partners: Female     Birth control/protection: None   Other Topics Concern    Not on file   Social History Narrative    Not on file     Social Determinants of Health     Financial Resource Strain: Not on file   Food Insecurity: No Food Insecurity (2/8/2024)    Hunger Vital Sign     Worried About Running Out of Food in the Last Year: Never true     Ran Out of Food in the Last Year: Never true   Transportation Needs: No Transportation Needs (2/8/2024)    PRAPARE - Transportation  "    Lack of Transportation (Medical): No     Lack of Transportation (Non-Medical): No   Physical Activity: Not on file   Stress: Not on file   Social Connections: Not on file   Intimate Partner Violence: Not on file   Housing Stability: Low Risk  (2/8/2024)    Housing Stability Vital Sign     Unable to Pay for Housing in the Last Year: No     Number of Places Lived in the Last Year: 1     Unstable Housing in the Last Year: No   Recent Concern: Housing Stability - High Risk (12/27/2023)    Housing Stability Vital Sign     Unable to Pay for Housing in the Last Year: Yes     Number of Places Lived in the Last Year: 1     Unstable Housing in the Last Year: No     Family History   Problem Relation Age of Onset    Stroke Mother     Hypertension Father     Cancer Father     COPD Father        Vitals:   Blood pressure 136/80, pulse 92, height 6' 1\" (1.854 m), weight (!) 150 kg (331 lb 9.6 oz), SpO2 99%.    Body mass index is 43.75 kg/m².    Wt Readings from Last 10 Encounters:   05/24/24 (!) 150 kg (331 lb 9.6 oz)   02/20/24 (!) 151 kg (332 lb 12.8 oz)   02/10/24 (!) 157 kg (346 lb 5.5 oz)   01/30/24 (!) 152 kg (334 lb)   01/11/24 (!) 153 kg (338 lb)   12/29/23 (!) 152 kg (335 lb 6.4 oz)   12/15/23 (!) 157 kg (346 lb)   12/13/23 (!) 157 kg (346 lb 3.2 oz)   11/30/23 (!) 150 kg (330 lb)   11/07/23 (!) 152 kg (335 lb 12.8 oz)     Vitals:    05/24/24 0817   BP: 136/80   BP Location: Left arm   Patient Position: Sitting   Cuff Size: Large   Pulse: 92   SpO2: 99%   Weight: (!) 150 kg (331 lb 9.6 oz)   Height: 6' 1\" (1.854 m)       Physical Exam  Vitals reviewed.   Constitutional:       General: He is awake. He is not in acute distress.     Appearance: Normal appearance. He is well-developed and overweight. He is not toxic-appearing or diaphoretic.   HENT:      Head: Normocephalic.      Nose: Nose normal.      Mouth/Throat:      Mouth: Mucous membranes are moist.   Eyes:      General: No scleral icterus.     Conjunctiva/sclera: " Conjunctivae normal.   Neck:      Vascular: No JVD.      Trachea: No tracheal deviation.   Cardiovascular:      Rate and Rhythm: Normal rate and regular rhythm.      Heart sounds: No murmur heard.  Pulmonary:      Effort: Pulmonary effort is normal. No tachypnea, bradypnea or respiratory distress.      Breath sounds: Normal air entry. No wheezing or rales.   Abdominal:      General: Bowel sounds are normal. There is no distension.      Palpations: Abdomen is soft.      Tenderness: There is no abdominal tenderness.   Musculoskeletal:      Cervical back: Neck supple.      Right lower leg: No edema.      Left lower leg: No edema.   Skin:     General: Skin is warm and dry.      Coloration: Skin is pale. Skin is not jaundiced.   Neurological:      General: No focal deficit present.      Mental Status: He is alert and oriented to person, place, and time.   Psychiatric:         Mood and Affect: Mood and affect normal.         Speech: Speech normal.         Behavior: Behavior normal. Behavior is cooperative.         Thought Content: Thought content normal.       Labs & Results:  Lab Results   Component Value Date    WBC 6.04 02/11/2024    HGB 7.9 (L) 02/11/2024    HCT 26.7 (L) 02/11/2024    MCV 86 02/11/2024     02/11/2024     Lab Results   Component Value Date    SODIUM 137 02/11/2024    K 3.5 02/11/2024    CL 98 02/11/2024    CO2 28 02/11/2024    BUN 18 02/11/2024    CREATININE 1.58 (H) 02/11/2024    GLUC 170 (H) 02/11/2024    CALCIUM 8.1 (L) 02/11/2024     Lab Results   Component Value Date    INR 1.29 (H) 10/12/2023    PROTIME 15.9 (H) 10/12/2023      Lab Results   Component Value Date     (H) 02/04/2024      Aster García PA-C

## 2024-05-24 ENCOUNTER — OFFICE VISIT (OUTPATIENT)
Dept: CARDIOLOGY CLINIC | Facility: CLINIC | Age: 59
End: 2024-05-24
Payer: COMMERCIAL

## 2024-05-24 VITALS
HEART RATE: 92 BPM | WEIGHT: 315 LBS | HEIGHT: 73 IN | OXYGEN SATURATION: 99 % | BODY MASS INDEX: 41.75 KG/M2 | DIASTOLIC BLOOD PRESSURE: 80 MMHG | SYSTOLIC BLOOD PRESSURE: 136 MMHG

## 2024-05-24 DIAGNOSIS — I10 PRIMARY HYPERTENSION: ICD-10-CM

## 2024-05-24 DIAGNOSIS — I50.32 CHRONIC HEART FAILURE WITH PRESERVED EJECTION FRACTION (HCC): Primary | ICD-10-CM

## 2024-05-24 DIAGNOSIS — I48.0 PAROXYSMAL ATRIAL FIBRILLATION (HCC): Chronic | ICD-10-CM

## 2024-05-24 DIAGNOSIS — N18.32 STAGE 3B CHRONIC KIDNEY DISEASE (HCC): ICD-10-CM

## 2024-05-24 DIAGNOSIS — Z95.818 PRESENCE OF CARDIOMEMS HF SYSTEM: ICD-10-CM

## 2024-05-24 PROCEDURE — 99214 OFFICE O/P EST MOD 30 MIN: CPT | Performed by: PHYSICIAN ASSISTANT

## 2024-05-24 NOTE — TELEPHONE ENCOUNTER
As a result of patient having OV with Aster García PA-C today, did not place a F/U call w/patient.

## 2024-05-24 NOTE — PATIENT INSTRUCTIONS
Continue with CardioMEMS checks.     Please weigh yourself every day (after emptying your bladder) and keep a detailed log of weights.   Contact the Heart Failure program at [phone] if you gain 3+ lbs overnight or 5+ lbs in 5-7 days.  Limit daily sodium/salt intake to 2000 mg daily to prevent fluid retention.  Avoid canned foods, fast food/Chinese food, and processed meats (hot dogs, lunch meat, and sausage etc.). Caution with condiments.  Limit fluid intake to 2000 mL or 2 liters (about 60-65 ounces) daily.  Avoid electrolyte replacement drinks (such as Gatorade, Pedialyte, Propel, Liquid IV, etc.).  Bring complete list of medications and log of daily weights to your follow-up appointment.

## 2024-05-31 ENCOUNTER — CLINICAL SUPPORT (OUTPATIENT)
Dept: CARDIOLOGY CLINIC | Facility: CLINIC | Age: 59
End: 2024-05-31
Payer: COMMERCIAL

## 2024-05-31 DIAGNOSIS — I50.32 CHRONIC HEART FAILURE WITH PRESERVED EJECTION FRACTION (HCC): Primary | ICD-10-CM

## 2024-05-31 PROCEDURE — 93264 REM MNTR WRLS P-ART PRS SNR: CPT | Performed by: INTERNAL MEDICINE

## 2024-06-08 DIAGNOSIS — I50.32 CHRONIC HEART FAILURE WITH PRESERVED EJECTION FRACTION (HCC): ICD-10-CM

## 2024-06-09 RX ORDER — METOPROLOL SUCCINATE 50 MG/1
50 TABLET, EXTENDED RELEASE ORAL DAILY
Qty: 30 TABLET | Refills: 5 | Status: ON HOLD | OUTPATIENT
Start: 2024-06-09

## 2024-06-14 ENCOUNTER — TELEPHONE (OUTPATIENT)
Dept: CARDIOLOGY CLINIC | Facility: CLINIC | Age: 59
End: 2024-06-14

## 2024-06-14 NOTE — TELEPHONE ENCOUNTER
Called patient who stated he is feeling good.    Stated this morning he noticed when putting on his shoes, they fit a little bit tighter & his feet were slightly swollen.    Denies any increase in SOB form his norm.    Denied chest pain or discomfort.    Taking all his cardiac medications as ordered.    Stated he is going to be more consistent with using his CardioMEMS pillow every day so as to get daily readings.

## 2024-06-18 ENCOUNTER — HOSPITAL ENCOUNTER (INPATIENT)
Facility: HOSPITAL | Age: 59
LOS: 6 days | Discharge: HOME | DRG: 309 | End: 2024-06-26
Attending: EMERGENCY MEDICINE | Admitting: INTERNAL MEDICINE
Payer: COMMERCIAL

## 2024-06-18 DIAGNOSIS — R94.39 ABNORMAL STRESS TEST: ICD-10-CM

## 2024-06-18 DIAGNOSIS — R06.09 DYSPNEA ON EXERTION: Primary | ICD-10-CM

## 2024-06-18 DIAGNOSIS — I48.92 ATRIAL FLUTTER WITH RAPID VENTRICULAR RESPONSE (HCC): ICD-10-CM

## 2024-06-18 PROBLEM — E87.5 HYPERKALEMIA: Status: ACTIVE | Noted: 2024-06-18

## 2024-06-18 PROBLEM — J45.40 MODERATE PERSISTENT ASTHMA WITHOUT COMPLICATION: Status: ACTIVE | Noted: 2024-06-18

## 2024-06-18 LAB
ANION GAP SERPL CALCULATED.3IONS-SCNC: 14 MMOL/L (ref 4–13)
ATRIAL RATE: 147 BPM
BASOPHILS # BLD AUTO: 0.06 THOUSANDS/ÂΜL (ref 0–0.1)
BASOPHILS NFR BLD AUTO: 1 % (ref 0–1)
BUN SERPL-MCNC: 33 MG/DL (ref 5–25)
CALCIUM SERPL-MCNC: 8.5 MG/DL (ref 8.4–10.2)
CHLORIDE SERPL-SCNC: 102 MMOL/L (ref 96–108)
CO2 SERPL-SCNC: 17 MMOL/L (ref 21–32)
CREAT SERPL-MCNC: 1.54 MG/DL (ref 0.6–1.3)
EOSINOPHIL # BLD AUTO: 0.25 THOUSAND/ÂΜL (ref 0–0.61)
EOSINOPHIL NFR BLD AUTO: 3 % (ref 0–6)
ERYTHROCYTE [DISTWIDTH] IN BLOOD BY AUTOMATED COUNT: 16.9 % (ref 11.6–15.1)
GFR SERPL CREATININE-BSD FRML MDRD: 49 ML/MIN/1.73SQ M
GLUCOSE SERPL-MCNC: 223 MG/DL (ref 65–140)
GLUCOSE SERPL-MCNC: 390 MG/DL (ref 65–140)
GLUCOSE SERPL-MCNC: 461 MG/DL (ref 65–140)
HCT VFR BLD AUTO: 30.7 % (ref 36.5–49.3)
HGB BLD-MCNC: 8.9 G/DL (ref 12–17)
IMM GRANULOCYTES # BLD AUTO: 0.1 THOUSAND/UL (ref 0–0.2)
IMM GRANULOCYTES NFR BLD AUTO: 1 % (ref 0–2)
LYMPHOCYTES # BLD AUTO: 0.88 THOUSANDS/ÂΜL (ref 0.6–4.47)
LYMPHOCYTES NFR BLD AUTO: 10 % (ref 14–44)
MCH RBC QN AUTO: 22.7 PG (ref 26.8–34.3)
MCHC RBC AUTO-ENTMCNC: 29 G/DL (ref 31.4–37.4)
MCV RBC AUTO: 78 FL (ref 82–98)
MONOCYTES # BLD AUTO: 0.66 THOUSAND/ÂΜL (ref 0.17–1.22)
MONOCYTES NFR BLD AUTO: 8 % (ref 4–12)
NEUTROPHILS # BLD AUTO: 6.5 THOUSANDS/ÂΜL (ref 1.85–7.62)
NEUTS SEG NFR BLD AUTO: 77 % (ref 43–75)
NRBC BLD AUTO-RTO: 0 /100 WBCS
P AXIS: 260 DEGREES
PLATELET # BLD AUTO: 339 THOUSANDS/UL (ref 149–390)
PMV BLD AUTO: 10.4 FL (ref 8.9–12.7)
POTASSIUM SERPL-SCNC: 5.4 MMOL/L (ref 3.5–5.3)
PR INTERVAL: 140 MS
QRS AXIS: 68 DEGREES
QRSD INTERVAL: 78 MS
QT INTERVAL: 268 MS
QTC INTERVAL: 414 MS
RBC # BLD AUTO: 3.92 MILLION/UL (ref 3.88–5.62)
SODIUM SERPL-SCNC: 133 MMOL/L (ref 135–147)
T WAVE AXIS: 115 DEGREES
VENTRICULAR RATE: 144 BPM
WBC # BLD AUTO: 8.45 THOUSAND/UL (ref 4.31–10.16)

## 2024-06-18 PROCEDURE — 92960 CARDIOVERSION ELECTRIC EXT: CPT | Performed by: EMERGENCY MEDICINE

## 2024-06-18 PROCEDURE — 5A2204Z RESTORATION OF CARDIAC RHYTHM, SINGLE: ICD-10-PCS | Performed by: FAMILY MEDICINE

## 2024-06-18 PROCEDURE — 96375 TX/PRO/DX INJ NEW DRUG ADDON: CPT

## 2024-06-18 PROCEDURE — 82948 REAGENT STRIP/BLOOD GLUCOSE: CPT

## 2024-06-18 PROCEDURE — 84484 ASSAY OF TROPONIN QUANT: CPT | Performed by: INTERNAL MEDICINE

## 2024-06-18 PROCEDURE — 96365 THER/PROPH/DIAG IV INF INIT: CPT

## 2024-06-18 PROCEDURE — 85025 COMPLETE CBC W/AUTO DIFF WBC: CPT

## 2024-06-18 PROCEDURE — 36415 COLL VENOUS BLD VENIPUNCTURE: CPT

## 2024-06-18 PROCEDURE — 99285 EMERGENCY DEPT VISIT HI MDM: CPT

## 2024-06-18 PROCEDURE — 84443 ASSAY THYROID STIM HORMONE: CPT | Performed by: INTERNAL MEDICINE

## 2024-06-18 PROCEDURE — 99223 1ST HOSP IP/OBS HIGH 75: CPT | Performed by: INTERNAL MEDICINE

## 2024-06-18 PROCEDURE — 92960 CARDIOVERSION ELECTRIC EXT: CPT

## 2024-06-18 PROCEDURE — 99291 CRITICAL CARE FIRST HOUR: CPT | Performed by: EMERGENCY MEDICINE

## 2024-06-18 PROCEDURE — 80048 BASIC METABOLIC PNL TOTAL CA: CPT

## 2024-06-18 PROCEDURE — 93010 ELECTROCARDIOGRAM REPORT: CPT | Performed by: INTERNAL MEDICINE

## 2024-06-18 PROCEDURE — 94640 AIRWAY INHALATION TREATMENT: CPT

## 2024-06-18 PROCEDURE — 93005 ELECTROCARDIOGRAM TRACING: CPT

## 2024-06-18 RX ORDER — ATORVASTATIN CALCIUM 10 MG/1
10 TABLET, FILM COATED ORAL DAILY
Status: DISCONTINUED | OUTPATIENT
Start: 2024-06-19 | End: 2024-06-26 | Stop reason: HOSPADM

## 2024-06-18 RX ORDER — BUMETANIDE 2 MG/1
6 TABLET ORAL 2 TIMES DAILY
Status: DISCONTINUED | OUTPATIENT
Start: 2024-06-18 | End: 2024-06-19

## 2024-06-18 RX ORDER — INSULIN LISPRO 100 [IU]/ML
1-6 INJECTION, SOLUTION INTRAVENOUS; SUBCUTANEOUS
Status: DISCONTINUED | OUTPATIENT
Start: 2024-06-18 | End: 2024-06-26 | Stop reason: HOSPADM

## 2024-06-18 RX ORDER — IPRATROPIUM BROMIDE AND ALBUTEROL SULFATE 2.5; .5 MG/3ML; MG/3ML
3 SOLUTION RESPIRATORY (INHALATION) ONCE
Status: COMPLETED | OUTPATIENT
Start: 2024-06-18 | End: 2024-06-18

## 2024-06-18 RX ORDER — MAGNESIUM SULFATE HEPTAHYDRATE 40 MG/ML
2 INJECTION, SOLUTION INTRAVENOUS ONCE
Status: COMPLETED | OUTPATIENT
Start: 2024-06-18 | End: 2024-06-18

## 2024-06-18 RX ORDER — INSULIN LISPRO 100 [IU]/ML
1-5 INJECTION, SOLUTION INTRAVENOUS; SUBCUTANEOUS
Status: DISCONTINUED | OUTPATIENT
Start: 2024-06-18 | End: 2024-06-18

## 2024-06-18 RX ORDER — METHYLPREDNISOLONE SODIUM SUCCINATE 125 MG/2ML
125 INJECTION, POWDER, LYOPHILIZED, FOR SOLUTION INTRAMUSCULAR; INTRAVENOUS ONCE
Status: COMPLETED | OUTPATIENT
Start: 2024-06-18 | End: 2024-06-18

## 2024-06-18 RX ORDER — INSULIN LISPRO 100 [IU]/ML
2-12 INJECTION, SOLUTION INTRAVENOUS; SUBCUTANEOUS
Status: DISCONTINUED | OUTPATIENT
Start: 2024-06-19 | End: 2024-06-21

## 2024-06-18 RX ORDER — ACETAMINOPHEN 325 MG/1
650 TABLET ORAL EVERY 6 HOURS PRN
Status: DISCONTINUED | OUTPATIENT
Start: 2024-06-18 | End: 2024-06-26 | Stop reason: HOSPADM

## 2024-06-18 RX ORDER — METOPROLOL SUCCINATE 50 MG/1
50 TABLET, EXTENDED RELEASE ORAL DAILY
Status: DISCONTINUED | OUTPATIENT
Start: 2024-06-18 | End: 2024-06-26 | Stop reason: HOSPADM

## 2024-06-18 RX ORDER — ASPIRIN 81 MG/1
4 TABLET, CHEWABLE ORAL ONCE
Status: COMPLETED | OUTPATIENT
Start: 2024-06-18 | End: 2024-06-18

## 2024-06-18 RX ORDER — INSULIN LISPRO 100 [IU]/ML
1-5 INJECTION, SOLUTION INTRAVENOUS; SUBCUTANEOUS
Status: DISCONTINUED | OUTPATIENT
Start: 2024-06-19 | End: 2024-06-18

## 2024-06-18 RX ORDER — PROPOFOL 10 MG/ML
100 INJECTION, EMULSION INTRAVENOUS ONCE
Status: COMPLETED | OUTPATIENT
Start: 2024-06-18 | End: 2024-06-18

## 2024-06-18 RX ORDER — PREGABALIN 50 MG/1
50 CAPSULE ORAL
Status: DISCONTINUED | OUTPATIENT
Start: 2024-06-18 | End: 2024-06-26 | Stop reason: HOSPADM

## 2024-06-18 RX ORDER — BUDESONIDE AND FORMOTEROL FUMARATE DIHYDRATE 160; 4.5 UG/1; UG/1
2 AEROSOL RESPIRATORY (INHALATION) 2 TIMES DAILY
Status: DISCONTINUED | OUTPATIENT
Start: 2024-06-19 | End: 2024-06-26 | Stop reason: HOSPADM

## 2024-06-18 RX ADMIN — METHYLPREDNISOLONE SODIUM SUCCINATE 125 MG: 125 INJECTION, POWDER, FOR SOLUTION INTRAMUSCULAR; INTRAVENOUS at 19:49

## 2024-06-18 RX ADMIN — PROPOFOL 100 MG: 10 INJECTION, EMULSION INTRAVENOUS at 18:43

## 2024-06-18 RX ADMIN — IPRATROPIUM BROMIDE AND ALBUTEROL SULFATE 3 ML: 2.5; .5 SOLUTION RESPIRATORY (INHALATION) at 19:49

## 2024-06-18 RX ADMIN — METOPROLOL SUCCINATE 50 MG: 50 TABLET, EXTENDED RELEASE ORAL at 23:26

## 2024-06-18 RX ADMIN — PREGABALIN 50 MG: 50 CAPSULE ORAL at 23:26

## 2024-06-18 RX ADMIN — BUMETANIDE 6 MG: 2 TABLET ORAL at 23:26

## 2024-06-18 RX ADMIN — INSULIN HUMAN 5 UNITS: 100 INJECTION, SOLUTION PARENTERAL at 23:27

## 2024-06-18 RX ADMIN — APIXABAN 5 MG: 5 TABLET, FILM COATED ORAL at 23:26

## 2024-06-18 RX ADMIN — INSULIN LISPRO 4 UNITS: 100 INJECTION, SOLUTION INTRAVENOUS; SUBCUTANEOUS at 22:41

## 2024-06-18 RX ADMIN — MAGNESIUM SULFATE HEPTAHYDRATE 2 G: 40 INJECTION, SOLUTION INTRAVENOUS at 18:54

## 2024-06-18 NOTE — ED PROCEDURE NOTE
Procedure  Cardioversion    Date/Time: 6/18/2024 6:54 PM    Performed by: Earl Sarkar MD  Authorized by: Earl Sarkar MD    Written consent obtained?: Yes    Risks and benefits: Risks, benefits and alternatives were discussed    Consent given by:  Patient  Patient states understanding of procedure being performed: Yes    Patient's understanding of procedure matches consent: Yes    Procedure consent matches procedure scheduled: Yes    Patient identity confirmed:  Verbally with patient and arm band  Patient sedated: Yes    Cardioversion basis:  Emergent  Pre-procedure rhythm:  Atrial flutter  Position: Patient was placed in a supine position    Chest area exposed: Chest area was exposed    Electrodes:  Pads  Electrodes placed:  Anterior-posterior  Number of attempts:  1  Attempt 1:     Attempt 1 mode:  Synchronous    Attempt 1 waveform:  Biphasic    Attempt 1 shock (Joules):  200    Attempt 1 outcome:  Conversion to normal sinus rhythm  Post-procedure rhythm:  Normal sinus rhythm  Complications: no complications    Patient tolerance:  Patient tolerated the procedure well with no immediate complications                   Earl Sarkar MD  06/18/24 0406

## 2024-06-18 NOTE — ED ATTENDING ATTESTATION
Final Diagnoses:     1. Dyspnea on exertion    2. Atrial flutter with rapid ventricular response (HCC)      ED Course as of 06/19/24 1443   Tue Jun 18, 2024   1848 Successful DCCV to NSR @ 90, AP placement, 200J  100mg Propofol    Sedated at 6:43 PM.  Patient awake at 6:50 PM.   1850 WBC: 8.45   1851 Hemoglobin(!): 8.9   1851 Platelet Count: 339       I, Alek Lewis MD, saw and evaluated the patient. All available labs and X-rays were ordered by me or the resident / non-physician and have been reviewed by myself. I discussed the patient with the resident / non-physician and agree with the resident's / non-physician practitioner's findings and plan as documented in the resident's / non-physician practicitioner's note, except where noted.   At this point, I agree with the current assessment done in the ED.   I was present during key portions of all procedures performed unless otherwise stated.     HPI:  NURSING TRIAGE:    This is a 59 y.o. male presenting for evaluation of sudden onset of CP/SOB with radiation of CP to the RIGHT jaw with palpitation sensation  Started suddenly 2 hours ago  Feels like heart racing primarily  EMS called  NTG + ASA given which didn't help  No f/ch/n/v/diaphoresis  He's non-compliant with metoprolol, hasn't taken in days b/c doesn't like how it makes him feel.    Chief Complaint   Patient presents with    Chest Pain     [facility] with c/o midsternal chest pain radiating to R jaw intermittent all week. Hx a fib and CHF, given 1 nitro and ASA by EMS      PHYSICAL: ASSESSMENT + PLAN:   Pertinent: AFlutter RVR (144).  Not dipahoretic  +miller  Obese  Appears uncomfortable.     General: VS reviewed  awake, alert.   Well-nourished, well-developed. Appears stated age.   Speaking normally in full sentences.   Head: Normocephalic, atraumatic  Eyes: EOM-I. No diplopia.   No hyphema.   No subconjunctival hemorrhages.  Symmetrical lids.   ENT: Atraumatic external nose and  ears.    MMM  No malocclusion. No stridor. Normal phonation. No drooling. Normal swallowing.   Neck: No JVD.  CV: No pallor noted  2+ radials bilaterally equal  Lungs:   No tachypnea  No respiratory distress  Abd: soft nt nd no rebound/guarding  MSK:   FROM spontaneously  Skin: Dry, intact.   Neuro: Awake, alert, GCS15, CN II-XII grossly intact.   Motor grossly intact.  Psychiatric/Behavioral: interacting normally; appropriate mood/affect.    Exam: deferred    Vitals:    06/19/24 0759 06/19/24 1043 06/19/24 1044 06/19/24 1045   BP: 136/76 126/73 125/73 122/72   Pulse: 81 86 86 90   Resp:       Temp: 98.7 °F (37.1 °C)      TempSrc:       SpO2: 95% 94% 96% 96%   Weight:        Likely AFlutter RVR related symptoms in setting of non-compliant  Labs  Expect troponin elevation as likely rate related    I think he'd be a good candidate for DCCV ? NSR ? DC.  On Eliquis, denies missing any doses in the last 4 weeks.   This is also sudden onset within 12 hours. Low risk.     Discuss this vs rate-control with BB + Mag vs Dilt.       There are no obvious limitations to social determinants of care.   Nursing note reviewed.   Vitals reviewed.   Orders placed by myself and/or advanced practitioner / resident.    Previous chart was reviewed  No language barrier.   History obtained from patient.    There are no limitations to the history obtained:  Critical Care Time:   - Critical care time: 34 minutes  - Critical care time was exclusive of seperately bilable procedures and treating other patients as well as teaching time.   - Critical care was necessary to treat or prevent imminent or life-threatening deterioration of the following condition: AFlutter RVR, dyspnea   - Critical care time was spent personally by me on the following activities as well as the above as per the ED course and rest of chart: blood draw for specimens, obtaining history from patient / surrogate, developement of a treatment plan, discussions with  consultants, evaluation of patient's response to the treatment, examination of the patient, ordering/performing treatements and interventions, re-evaluation of the patient's condition, review of old charts, and ordering/reviewing laboratory studies   Past Medical: Past Surgical:    has a past medical history of Acute gastritis without hemorrhage, Asthma, Atrial fibrillation (HCC), Bilateral leg edema (02/19/2020), CHF (congestive heart failure) ([facility]), Coronary artery disease, Daytime sleepiness (07/17/2019), Diabetes mellitus (HCC), Dyspnea on exertion (10/11/2021), Edema of both feet (01/23/2020), Gastric bypass status for obesity, Hyperlipidemia, Hypertension, Hypokalemia (11/14/2021), Impaired fasting glucose, Knee sprain, bilateral (06/14/2018), Leg cramps (06/16/2022), Obesity, and Viral gastroenteritis.  has a past surgical history that includes Gastric bypass (2004); Hernia repair; Carpal tunnel release; Tonsillectomy; Cardiac catheterization (N/A, 3/9/2022); Cardiac catheterization (N/A, 9/6/2022); Cardiac catheterization (N/A, 9/6/2022); Cardiac catheterization (N/A, 10/11/2023); IR biopsy bone marrow (12/14/2023); CHOLECYSTECTOMY LAPAROSCOPIC (N/A, 2/7/2024); LAPAROSCOPIC TRANSGASTRIC ACCESS FOR ERCP (N/A, 2/7/2024); and LAPAROSCOPIC TRANSGASTRIC ACCESS FOR ERCP (N/A, 2/7/2024).   Social: Cardiac (Echo/Cath)   Social History     Substance and Sexual Activity   Alcohol Use Yes    Alcohol/week: 2.0 standard drinks of alcohol    Types: 2 Shots of liquor per week    Comment: social     Social History     Tobacco Use   Smoking Status Former    Types: Pipe, Cigars    Start date: 2016    Quit date: 1/1/2021    Years since quitting: 3.4    Passive exposure: Never   Smokeless Tobacco Never   Tobacco Comments    cigar once a day     Social History     Substance and Sexual Activity   Drug Use No    Results for orders placed during the hospital encounter of 07/18/21    Echo complete with contrast if  indicated    Narrative  [address] [address]  [phone]    Transthoracic Echocardiogram  2D, M-mode, Doppler, and Color Doppler    Study date:  2021    Patient: KARY GRANADOS  MR number: [id number]  Account number: [id number]  : 1965  Age: 56 years  Gender: Male  Status: Inpatient  Location: Bedside  Height: 73 in  Weight: 359.3 lb  BP: 177/ 91 mmHg    Indications: Assess congestive heart failure.    Diagnoses: I42.9 - Cardiomyopathy, unspecified    Sonographer:  TOSHIA Foster  Primary Physician:  Soo Chavez MD  Group:  [facility]  Cardiology Fellow:  Babak Badillo  Interpreting Physician:  Rohith Syed MD    SUMMARY    LEFT VENTRICLE:  Size was at the upper limits of normal.  Systolic function was at the lower limits of normal. Ejection fraction was estimated to be 50 %.  There were no regional wall motion abnormalities.  There was akinesis of the basal inferior and basal-mid inferolateral wall(s).  There was no evidence of concentric hypertrophy.  Doppler parameters were consistent with abnormal left ventricular relaxation (grade 1 diastolic dysfunction).    LEFT ATRIUM:  The atrium was mildly dilated.    MITRAL VALVE:  There was mild annular calcification.  There was trace regurgitation.    TRICUSPID VALVE:  There was trace regurgitation.    IVC, HEPATIC VEINS:  The inferior vena cava was mildly dilated.    PROCEDURE: The procedure was performed at the bedside. This was a routine study. The transthoracic approach was used. The study included complete 2D imaging, M-mode, complete spectral Doppler, and color Doppler. The heart rate was 87 bpm,  at the start of the study. Images were obtained from the parasternal, apical, subcostal, and suprasternal notch acoustic windows. Echocardiographic views were limited due to restricted patient mobility, decreased penetration, and  lung  interference. This was a technically difficult study.    LEFT VENTRICLE: Size was at the upper limits of normal. Systolic function was at the lower limits of normal. Ejection fraction was estimated to be 50 %. There were no regional wall motion abnormalities. There was akinesis of the basal  inferior and basal-mid inferolateral wall(s). Wall thickness was normal. There was no evidence of concentric hypertrophy. DOPPLER: Doppler parameters were consistent with abnormal left ventricular relaxation (grade 1 diastolic dysfunction).    RIGHT VENTRICLE: The size was normal. Systolic function was normal. Wall thickness was normal.    LEFT ATRIUM: The atrium was mildly dilated.    RIGHT ATRIUM: Size was normal.    MITRAL VALVE: There was mild annular calcification. Valve structure was normal. There was normal leaflet separation. DOPPLER: The transmitral velocity was within the normal range. There was no evidence for stenosis. There was trace  regurgitation.    AORTIC VALVE: The valve was trileaflet. Leaflets exhibited mildly increased thickness, mild calcification, and normal cuspal separation. DOPPLER: Transaortic velocity was within the normal range. There was no evidence for stenosis. There  was no significant regurgitation.    TRICUSPID VALVE: The valve structure was normal. There was normal leaflet separation. DOPPLER: The transtricuspid velocity was within the normal range. There was no evidence for stenosis. There was trace regurgitation.    PULMONIC VALVE: Leaflets exhibited normal thickness, no calcification, and normal cuspal separation. DOPPLER: The transpulmonic velocity was within the normal range. There was no significant regurgitation.    PERICARDIUM: There was no pericardial effusion. The pericardium was normal in appearance.    AORTA: The root exhibited normal size. The ascending aorta was normal in size.    SYSTEMIC VEINS: IVC: The inferior vena cava was mildly dilated. Respirophasic changes were  normal.    SYSTEM MEASUREMENT TABLES    2D  %FS: 21.53 %  Ao Diam: 3.19 cm  Ao asc: 3.32 cm  EDV(Teich): 188.84 ml  EF(Teich): 42.83 %  ESV(Teich): 107.95 ml  IVSd: 0.8 cm  LA Area: 19.94 cm2  LA Diam: 4.66 cm  LVEDV MOD A4C: 126.08 ml  LVEF MOD A4C: 46.23 %  LVESV MOD A4C: 67.8 ml  LVIDd: 6.13 cm  LVIDs: 4.81 cm  LVLd A4C: 8.23 cm  LVLs A4C: 7.32 cm  LVPWd: 0.78 cm  RA Area: 20.73 cm2  RVIDd: 3.74 cm  SV MOD A4C: 58.28 ml  SV(Teich): 80.89 ml    MM  TAPSE: 2.77 cm    PW  E' Sept: 0.08 m/s  E/E' Sept: 9.01  MV A Zach: 0.84 m/s  MV Dec [place]: 3.3 m/s2  MV DecT: 228.2 ms  MV E Zach: 0.75 m/s  MV E/A Ratio: 0.89  MV PHT: 66.18 ms  MVA By PHT: 3.32 cm2    [employer] Commission Accredited Echocardiography Laboratory    Prepared and electronically signed by    Rohith Syed MD  Signed 20-Jul-2021 07:13:15    No results found for this or any previous visit.    No results found for this or any previous visit.     Labs: Imaging:   Labs Reviewed   CBC AND DIFFERENTIAL - Abnormal       Result Value Ref Range Status    WBC 8.45  4.31 - 10.16 Thousand/uL Final    RBC 3.92  3.88 - 5.62 Million/uL Final    Hemoglobin 8.9 (*) 12.0 - 17.0 g/dL Final    Hematocrit 30.7 (*) 36.5 - 49.3 % Final    MCV 78 (*) 82 - 98 fL Final    MCH 22.7 (*) 26.8 - 34.3 pg Final    MCHC 29.0 (*) 31.4 - 37.4 g/dL Final    RDW 16.9 (*) 11.6 - 15.1 % Final    MPV 10.4  8.9 - 12.7 fL Final    Platelets 339  149 - 390 Thousands/uL Final    nRBC 0  /100 WBCs Final    Segmented % 77 (*) 43 - 75 % Final    Immature Grans % 1  0 - 2 % Final    Lymphocytes % 10 (*) 14 - 44 % Final    Monocytes % 8  4 - 12 % Final    Eosinophils Relative 3  0 - 6 % Final    Basophils Relative 1  0 - 1 % Final    Absolute Neutrophils 6.50  1.85 - 7.62 Thousands/µL Final    Absolute Immature Grans 0.10  0.00 - 0.20 Thousand/uL Final    Absolute Lymphocytes 0.88  0.60 - 4.47 Thousands/µL Final    Absolute Monocytes 0.66  0.17 - 1.22 Thousand/µL Final    Eosinophils Absolute 0.25   0.00 - 0.61 Thousand/µL Final    Basophils Absolute 0.06  0.00 - 0.10 Thousands/µL Final   BASIC METABOLIC PANEL - Abnormal    Sodium 133 (*) 135 - 147 mmol/L Final    Potassium 5.4 (*) 3.5 - 5.3 mmol/L Final    Comment: Slightly Hemolyzed:Results may be affected.    Chloride 102  96 - 108 mmol/L Final    CO2 17 (*) 21 - 32 mmol/L Final    ANION GAP 14 (*) 4 - 13 mmol/L Final    BUN 33 (*) 5 - 25 mg/dL Final    Creatinine 1.54 (*) 0.60 - 1.30 mg/dL Final    Comment: Standardized to IDMS reference method    Glucose 223 (*) 65 - 140 mg/dL Final    Comment: If the patient is fasting, the ADA then defines impaired fasting glucose as > 100 mg/dL and diabetes as > or equal to 123 mg/dL.    Calcium 8.5  8.4 - 10.2 mg/dL Final    eGFR 49  ml/min/1.73sq m Final    Narrative:     National Kidney Disease Foundation guidelines for Chronic Kidney Disease (CKD):     Stage 1 with normal or high GFR (GFR > 90 mL/min/1.73 square meters)    Stage 2 Mild CKD (GFR = 60-89 mL/min/1.73 square meters)    Stage 3A Moderate CKD (GFR = 45-59 mL/min/1.73 square meters)    Stage 3B Moderate CKD (GFR = 30-44 mL/min/1.73 square meters)    Stage 4 Severe CKD (GFR = 15-29 mL/min/1.73 square meters)    Stage 5 End Stage CKD (GFR <15 mL/min/1.73 square meters)  Note: GFR calculation is accurate only with a steady state creatinine   POCT GLUCOSE - Abnormal    POC Glucose 390 (*) 65 - 140 mg/dl Final    XR chest portable   Final Result      No acute cardiopulmonary disease.            Workstation performed: [id number]            Medications: Code Status:   Medications   apixaban (ELIQUIS) tablet 5 mg (5 mg Oral Given 6/19/24 0848)   atorvastatin (LIPITOR) tablet 10 mg (10 mg Oral Given 6/19/24 0848)   budesonide-formoterol (SYMBICORT) 160-4.5 mcg/act inhaler 2 puff (2 puffs Inhalation Given 6/19/24 0849)   Empagliflozin (JARDIANCE) tablet 10 mg (10 mg Oral Given 6/19/24 0849)   metoprolol succinate (TOPROL-XL) 24 hr tablet 50 mg (50 mg Oral Given  6/19/24 0848)   pregabalin (LYRICA) capsule 50 mg (50 mg Oral Given 6/18/24 2326)   sitaGLIPtin (JANUVIA) tablet 50 mg (50 mg Oral Given 6/19/24 0849)   umeclidinium 62.5 mcg/actuation inhaler AEPB 1 puff (1 puff Inhalation Given 6/19/24 0849)   acetaminophen (TYLENOL) tablet 650 mg (has no administration in time range)   insulin lispro (HumALOG/ADMELOG) 100 units/mL subcutaneous injection 2-12 Units (6 Units Subcutaneous Given 6/19/24 1442)   insulin lispro (HumALOG/ADMELOG) 100 units/mL subcutaneous injection 1-6 Units (6 Units Subcutaneous Given 6/19/24 0059)   levalbuterol (XOPENEX) inhalation solution 0.63 mg (has no administration in time range)   furosemide (LASIX) injection 40 mg (has no administration in time range)   aspirin chewable tablet 324 mg (0 mg Does not apply Given to EMS 6/18/24 1759)   propofol (DIPRIVAN) 200 MG/20ML bolus injection 100 mg (100 mg Intravenous Given 6/18/24 1843)   magnesium sulfate 2 g/50 mL IVPB (premix) 2 g (0 g Intravenous Stopped 6/18/24 1914)   ipratropium-albuterol (DUO-NEB) 0.5-2.5 mg/3 mL inhalation solution 3 mL (3 mL Nebulization Given 6/18/24 1949)   methylPREDNISolone sodium succinate (Solu-MEDROL) injection 125 mg (125 mg Intravenous Given 6/18/24 1949)   insulin regular (HumuLIN R,NovoLIN R) injection 5 Units (5 Units Subcutaneous Given 6/18/24 2327)    Code Status: Level 1 - Full Code  Advance Directive and Living Will:      Power of :    POLST:       Orders Placed This Encounter   Procedures    Electrical Cardioversion    XR chest portable    CBC and differential    Basic metabolic panel    HS Troponin 0hr (reflex protocol)    Hemoglobin A1c w/EAG Estimation (Prechecked if no A1C within 90 days)    HS Troponin I 2hr    HS Troponin I 4hr    TSH, 3rd generation with Free T4 reflex    Basic metabolic panel    CBC and differential    RBC Morphology Reflex Test    Diet Roman/CHO Controlled; Consistent Carbohydrate Diet Level 2 (5 carb servings/75 grams CHO/meal)     Continuous pulse oximetry    Continuous cardiac monitoring    Nursing communication Continue IV as ordered.    Notify admitting physician    Notify admitting physician on arrival    Vital Signs per unit routine    Up and OOB as tolerated    I/O    Daily weights    Apply SCD or Foot pumps    Insulin Subcutaneous Notify Physician    Insulin Subcutaneous Instruction    Hypoglycemia Protocol    Insulin Subcutaneous Notify Physician    Insulin Subcutaneous Instruction    Hypoglycemia Protocol    Fingerstick Glucose (POCT)    Vital signs (specify frequency)    Continuous ST Segment Monitoring    Provide patient with the Heart Attack Education Booklet    Provide patient with the Heart Attack Zone Tool    Nursing communication ECG 12 lead with chest pain or ST segment change and notify MD. Place an ECG order if performed.    24 Hour Telemetry Monitoring    Apply SCD or Foot pumps    Orthostatic blood pressure    Level 1-Full Code: all life saving measures are indicated    Inpatient consult to Cardiology    OT eval and treat    PT eval and treat    ECG 12 lead    ECG 12 lead    ECG 12 lead with 2nd Troponin    ECG 12 lead with 3rd Troponin    ECG 12 lead every 30 minutes x 2 PRN if first ECG non-diagnostic and persistent CP    ECG 12 lead    ECG 12 lead    ECG 12 lead    Echo complete w/ contrast if indicated    Place in Observation     Time reflects when diagnosis was documented in both MDM as applicable and the Disposition within this note       Time User Action Codes Description Comment    6/18/2024  9:57 PM Earl Sarkar [R06.09] Dyspnea on exertion     6/18/2024  9:57 PM Earl Sarkar [I48.92] Atrial flutter with rapid ventricular response (HCC)           ED Disposition       ED Disposition   Admit    Condition   Stable    Date/Time   Tue Jun 18, 2024  9:57 PM    Comment   Case was discussed with ADEN and the patient's admission status was agreed to be Admission Status: observation status  .                Follow-up Information    None       Current Discharge Medication List        CONTINUE these medications which have NOT CHANGED    Details   Accu-Chek FastClix Lancets MISC Test blood sugar twice daily  Qty: 200 each, Refills: 3    Associated Diagnoses: Type 2 diabetes mellitus with hyperglycemia, without long-term current use of insulin ([facility])      acetaminophen (TYLENOL) 325 mg tablet Take 2 tablets (650 mg total) by mouth every 6 (six) hours as needed for mild pain, headaches or fever    Associated Diagnoses: Acute gallstone pancreatitis; Choledocholithiasis      albuterol (PROVENTIL HFA,VENTOLIN HFA) 90 mcg/act inhaler Inhale 2 puffs every 4 (four) hours as needed for wheezing or shortness of breath  Qty: 18 g, Refills: 5    Comments: Substitution to a formulary equivalent within the same pharmaceutical class is authorized.  Associated Diagnoses: Mild intermittent asthma without complication      apixaban (Eliquis) 5 mg Take 1 tablet (5 mg total) by mouth 2 (two) times a day  Qty: 60 tablet, Refills: 5    Associated Diagnoses: A-fib ([facility]); Atrial fibrillation, unspecified type ([facility])      atorvastatin (LIPITOR) 10 mg tablet TAKE 1 TABLET BY MOUTH EVERY DAY  Qty: 90 tablet, Refills: 3    Associated Diagnoses: Mixed hyperlipidemia      Blood Glucose Monitoring Suppl (Accu-Chek Guide) w/Device KIT Use 2 (two) times a day Test blood sugar twice daily  Qty: 1 kit, Refills: 0    Associated Diagnoses: Type 2 diabetes mellitus with hyperglycemia, without long-term current use of insulin ([facility])      budesonide-formoterol (Symbicort) 160-4.5 mcg/act inhaler Inhale 2 puffs 2 (two) times a day Rinse mouth after use.  Qty: 30.6 g, Refills: 2    Associated Diagnoses: Moderate persistent asthma with acute exacerbation      bumetanide (BUMEX) 2 mg tablet Take 3 tablets (6 mg total) by mouth 2 (two) times a day  Qty: 540 tablet, Refills: 1    Associated Diagnoses: Chronic heart failure with preserved ejection fraction ([facility])       Empagliflozin (JARDIANCE) 10 MG TABS tablet Take 1 tablet (10 mg total) by mouth daily  Qty: 30 tablet, Refills: 11    Associated Diagnoses: Acute on chronic right-sided heart failure (HCC); Type 2 diabetes mellitus with stage 3b chronic kidney disease, without long-term current use of insulin ([facility])      EPINEPHrine (EPIPEN) 0.3 mg/0.3 mL SOAJ Inject 0.3 mL (0.3 mg total) into a muscle once for 1 dose  Qty: 0.6 mL, Refills: 0    Associated Diagnoses: Anaphylaxis, initial encounter      fluticasone (FLONASE) 50 mcg/act nasal spray 1 spray into each nostril 2 (two) times a day  Refills: 0    Associated Diagnoses: Nasal congestion      Klor-Con M20 20 MEQ tablet TAKE 2 TABLETS BY MOUTH 2 TIMES A DAY.  Qty: 360 tablet, Refills: 2    Associated Diagnoses: Diuretic-induced hypokalemia      loratadine (CLARITIN) 10 mg tablet Take 1 tablet (10 mg total) by mouth daily Do not start before October 13, 2023.  Refills: 0    Associated Diagnoses: Nasal congestion      metolazone (ZAROXOLYN) 2.5 mg tablet Take 2 tablets (5 mg total) by mouth if needed (weight gain of 3+ lbs in 24 hours, 5+ lbs in one week or signs of worsening fluid retention) Take 20-30 minutes before Bumex dose and with additional potassium    Associated Diagnoses: Chronic heart failure with preserved ejection fraction (HCC)      metoprolol succinate (TOPROL-XL) 50 mg 24 hr tablet Take 1 tablet (50 mg total) by mouth daily  Qty: 30 tablet, Refills: 5    Associated Diagnoses: Chronic heart failure with preserved ejection fraction (HCC)      montelukast (SINGULAIR) 10 mg tablet Take 1 tablet (10 mg total) by mouth daily at bedtime  Qty: 90 tablet, Refills: 1    Associated Diagnoses: Moderate persistent asthma with acute exacerbation      pregabalin (LYRICA) 50 mg capsule Take 1 caps. at bedtime  Qty: 30 capsule, Refills: 5    Associated Diagnoses: Diabetic polyneuropathy associated with type 2 diabetes mellitus (HCC)      sitaGLIPtin (Januvia) 100 mg tablet  TAKE 1/2 TABLET BY MOUTH EVERY DAY  Qty: 15 tablet, Refills: 5    Associated Diagnoses: Type 2 diabetes mellitus with stage 3b chronic kidney disease, without long-term current use of insulin ([facility])      spironolactone (ALDACTONE) 25 mg tablet TAKE 1 TABLET (25 MG TOTAL) BY MOUTH DAILY.  Qty: 90 tablet, Refills: 1    Associated Diagnoses: Chronic heart failure with preserved ejection fraction (HFpEF) ([facility])      tiotropium (Spiriva Respimat) 1.25 MCG/ACT AERS inhaler Inhale 2 puffs daily  Qty: 4 g, Refills: 0    Associated Diagnoses: Moderate persistent asthma with acute exacerbation           No discharge procedures on file.  Prior to Admission Medications   Prescriptions Last Dose Informant Patient Reported? Taking?   Accu-Chek FastClix Lancets MISC  Self No No   Sig: Test blood sugar twice daily   Blood Glucose Monitoring Suppl (Accu-Chek Guide) w/Device KIT  Self No No   Sig: Use 2 (two) times a day Test blood sugar twice daily   EPINEPHrine (EPIPEN) 0.3 mg/0.3 mL SOAJ  Self No No   Sig: Inject 0.3 mL (0.3 mg total) into a muscle once for 1 dose   Empagliflozin (JARDIANCE) 10 MG TABS tablet  Self No No   Sig: Take 1 tablet (10 mg total) by mouth daily   Klor-Con M20 20 MEQ tablet   No No   Sig: TAKE 2 TABLETS BY MOUTH 2 TIMES A DAY.   acetaminophen (TYLENOL) 325 mg tablet   No No   Sig: Take 2 tablets (650 mg total) by mouth every 6 (six) hours as needed for mild pain, headaches or fever   albuterol (PROVENTIL HFA,VENTOLIN HFA) 90 mcg/act inhaler   No No   Sig: Inhale 2 puffs every 4 (four) hours as needed for wheezing or shortness of breath   apixaban (Eliquis) 5 mg  Self No No   Sig: Take 1 tablet (5 mg total) by mouth 2 (two) times a day   atorvastatin (LIPITOR) 10 mg tablet   No No   Sig: TAKE 1 TABLET BY MOUTH EVERY DAY   budesonide-formoterol (Symbicort) 160-4.5 mcg/act inhaler   No No   Sig: Inhale 2 puffs 2 (two) times a day Rinse mouth after use.   bumetanide (BUMEX) 2 mg tablet   No No   Sig: Take 3  "tablets (6 mg total) by mouth 2 (two) times a day   fluticasone (FLONASE) 50 mcg/act nasal spray  Self No No   Si spray into each nostril 2 (two) times a day   loratadine (CLARITIN) 10 mg tablet  Self No No   Sig: Take 1 tablet (10 mg total) by mouth daily Do not start before 2023.   metolazone (ZAROXOLYN) 2.5 mg tablet   No No   Sig: Take 2 tablets (5 mg total) by mouth if needed (weight gain of 3+ lbs in 24 hours, 5+ lbs in one week or signs of worsening fluid retention) Take 20-30 minutes before Bumex dose and with additional potassium   metoprolol succinate (TOPROL-XL) 50 mg 24 hr tablet   No No   Sig: Take 1 tablet (50 mg total) by mouth daily   montelukast (SINGULAIR) 10 mg tablet  Self No No   Sig: Take 1 tablet (10 mg total) by mouth daily at bedtime   pregabalin (LYRICA) 50 mg capsule   No No   Sig: Take 1 caps. at bedtime   sitaGLIPtin (Januvia) 100 mg tablet   No No   Sig: TAKE 1/2 TABLET BY MOUTH EVERY DAY   spironolactone (ALDACTONE) 25 mg tablet   No No   Sig: TAKE 1 TABLET (25 MG TOTAL) BY MOUTH DAILY.   tiotropium (Spiriva Respimat) 1.25 MCG/ACT AERS inhaler   No No   Sig: Inhale 2 puffs daily      Facility-Administered Medications: None                        Portions of the record may have been created with voice recognition software. Occasional wrong word or \"sound a like\" substitutions may have occurred due to the inherent limitations of voice recognition software. Read the chart carefully and recognize, using context, where substitutions have occurred.    Electronically signed by:  Alek Lewis  "

## 2024-06-19 ENCOUNTER — APPOINTMENT (OUTPATIENT)
Dept: RADIOLOGY | Facility: HOSPITAL | Age: 59
DRG: 309 | End: 2024-06-19
Payer: COMMERCIAL

## 2024-06-19 PROBLEM — Z00.00 WELL ADULT EXAM: Status: RESOLVED | Noted: 2021-05-14 | Resolved: 2024-06-19

## 2024-06-19 LAB
2HR DELTA HS TROPONIN: -1 NG/L
4HR DELTA HS TROPONIN: 2 NG/L
ANION GAP SERPL CALCULATED.3IONS-SCNC: 13 MMOL/L (ref 4–13)
ANISOCYTOSIS BLD QL SMEAR: PRESENT
ATRIAL RATE: 82 BPM
ATRIAL RATE: 87 BPM
ATRIAL RATE: 88 BPM
ATRIAL RATE: 89 BPM
BASOPHILS # BLD MANUAL: 0 THOUSAND/UL (ref 0–0.1)
BASOPHILS NFR MAR MANUAL: 0 % (ref 0–1)
BUN SERPL-MCNC: 43 MG/DL (ref 5–25)
CALCIUM SERPL-MCNC: 8.6 MG/DL (ref 8.4–10.2)
CARDIAC TROPONIN I PNL SERPL HS: 57 NG/L
CARDIAC TROPONIN I PNL SERPL HS: 58 NG/L
CARDIAC TROPONIN I PNL SERPL HS: 60 NG/L
CHLORIDE SERPL-SCNC: 97 MMOL/L (ref 96–108)
CO2 SERPL-SCNC: 20 MMOL/L (ref 21–32)
CREAT SERPL-MCNC: 1.83 MG/DL (ref 0.6–1.3)
EOSINOPHIL # BLD MANUAL: 0 THOUSAND/UL (ref 0–0.4)
EOSINOPHIL NFR BLD MANUAL: 0 % (ref 0–6)
ERYTHROCYTE [DISTWIDTH] IN BLOOD BY AUTOMATED COUNT: 16.9 % (ref 11.6–15.1)
EST. AVERAGE GLUCOSE BLD GHB EST-MCNC: 192 MG/DL
GFR SERPL CREATININE-BSD FRML MDRD: 40 ML/MIN/1.73SQ M
GLUCOSE SERPL-MCNC: 216 MG/DL (ref 65–140)
GLUCOSE SERPL-MCNC: 228 MG/DL (ref 65–140)
GLUCOSE SERPL-MCNC: 268 MG/DL (ref 65–140)
GLUCOSE SERPL-MCNC: 283 MG/DL (ref 65–140)
GLUCOSE SERPL-MCNC: 306 MG/DL (ref 65–140)
GLUCOSE SERPL-MCNC: 306 MG/DL (ref 65–140)
GLUCOSE SERPL-MCNC: 391 MG/DL (ref 65–140)
GLUCOSE SERPL-MCNC: 395 MG/DL (ref 65–140)
HBA1C MFR BLD: 8.3 %
HCT VFR BLD AUTO: 29.5 % (ref 36.5–49.3)
HGB BLD-MCNC: 8.7 G/DL (ref 12–17)
LYMPHOCYTES # BLD AUTO: 0.18 THOUSAND/UL (ref 0.6–4.47)
LYMPHOCYTES # BLD AUTO: 2 % (ref 14–44)
MCH RBC QN AUTO: 22.7 PG (ref 26.8–34.3)
MCHC RBC AUTO-ENTMCNC: 29.5 G/DL (ref 31.4–37.4)
MCV RBC AUTO: 77 FL (ref 82–98)
MICROCYTES BLD QL AUTO: PRESENT
MONOCYTES # BLD AUTO: 0.09 THOUSAND/UL (ref 0–1.22)
MONOCYTES NFR BLD: 1 % (ref 4–12)
NEUTROPHILS # BLD MANUAL: 8.65 THOUSAND/UL (ref 1.85–7.62)
NEUTS SEG NFR BLD AUTO: 97 % (ref 43–75)
NRBC BLD AUTO-RTO: 1 /100 WBC (ref 0–2)
OVALOCYTES BLD QL SMEAR: PRESENT
P AXIS: 48 DEGREES
P AXIS: 48 DEGREES
P AXIS: 49 DEGREES
P AXIS: 50 DEGREES
PLATELET # BLD AUTO: 347 THOUSANDS/UL (ref 149–390)
PLATELET BLD QL SMEAR: ADEQUATE
PMV BLD AUTO: 9.8 FL (ref 8.9–12.7)
POIKILOCYTOSIS BLD QL SMEAR: PRESENT
POLYCHROMASIA BLD QL SMEAR: PRESENT
POTASSIUM SERPL-SCNC: 4.8 MMOL/L (ref 3.5–5.3)
PR INTERVAL: 146 MS
PR INTERVAL: 152 MS
PR INTERVAL: 156 MS
PR INTERVAL: 156 MS
QRS AXIS: 43 DEGREES
QRS AXIS: 45 DEGREES
QRS AXIS: 53 DEGREES
QRS AXIS: 54 DEGREES
QRSD INTERVAL: 84 MS
QRSD INTERVAL: 84 MS
QRSD INTERVAL: 86 MS
QRSD INTERVAL: 88 MS
QT INTERVAL: 364 MS
QT INTERVAL: 396 MS
QT INTERVAL: 398 MS
QT INTERVAL: 410 MS
QTC INTERVAL: 438 MS
QTC INTERVAL: 479 MS
QTC INTERVAL: 479 MS
QTC INTERVAL: 484 MS
RBC # BLD AUTO: 3.83 MILLION/UL (ref 3.88–5.62)
RBC MORPH BLD: PRESENT
SODIUM SERPL-SCNC: 130 MMOL/L (ref 135–147)
T WAVE AXIS: 61 DEGREES
T WAVE AXIS: 69 DEGREES
T WAVE AXIS: 78 DEGREES
T WAVE AXIS: 79 DEGREES
TSH SERPL DL<=0.05 MIU/L-ACNC: 2.9 UIU/ML (ref 0.45–4.5)
VENTRICULAR RATE: 82 BPM
VENTRICULAR RATE: 87 BPM
VENTRICULAR RATE: 88 BPM
VENTRICULAR RATE: 89 BPM
WBC # BLD AUTO: 8.92 THOUSAND/UL (ref 4.31–10.16)

## 2024-06-19 PROCEDURE — 93010 ELECTROCARDIOGRAM REPORT: CPT | Performed by: INTERNAL MEDICINE

## 2024-06-19 PROCEDURE — 93005 ELECTROCARDIOGRAM TRACING: CPT

## 2024-06-19 PROCEDURE — 80048 BASIC METABOLIC PNL TOTAL CA: CPT | Performed by: INTERNAL MEDICINE

## 2024-06-19 PROCEDURE — 71045 X-RAY EXAM CHEST 1 VIEW: CPT

## 2024-06-19 PROCEDURE — 85027 COMPLETE CBC AUTOMATED: CPT | Performed by: INTERNAL MEDICINE

## 2024-06-19 PROCEDURE — 84484 ASSAY OF TROPONIN QUANT: CPT

## 2024-06-19 PROCEDURE — 99244 OFF/OP CNSLTJ NEW/EST MOD 40: CPT | Performed by: INTERNAL MEDICINE

## 2024-06-19 PROCEDURE — 85007 BL SMEAR W/DIFF WBC COUNT: CPT | Performed by: INTERNAL MEDICINE

## 2024-06-19 PROCEDURE — 97166 OT EVAL MOD COMPLEX 45 MIN: CPT

## 2024-06-19 PROCEDURE — 97163 PT EVAL HIGH COMPLEX 45 MIN: CPT

## 2024-06-19 PROCEDURE — 99232 SBSQ HOSP IP/OBS MODERATE 35: CPT | Performed by: INTERNAL MEDICINE

## 2024-06-19 PROCEDURE — 83036 HEMOGLOBIN GLYCOSYLATED A1C: CPT | Performed by: INTERNAL MEDICINE

## 2024-06-19 PROCEDURE — 84484 ASSAY OF TROPONIN QUANT: CPT | Performed by: INTERNAL MEDICINE

## 2024-06-19 PROCEDURE — 82948 REAGENT STRIP/BLOOD GLUCOSE: CPT

## 2024-06-19 RX ORDER — LEVALBUTEROL INHALATION SOLUTION 0.63 MG/3ML
0.63 SOLUTION RESPIRATORY (INHALATION)
Status: DISCONTINUED | OUTPATIENT
Start: 2024-06-19 | End: 2024-06-19

## 2024-06-19 RX ORDER — BUMETANIDE 2 MG/1
6 TABLET ORAL 2 TIMES DAILY
Status: DISCONTINUED | OUTPATIENT
Start: 2024-06-19 | End: 2024-06-20

## 2024-06-19 RX ORDER — LEVALBUTEROL INHALATION SOLUTION 0.63 MG/3ML
0.63 SOLUTION RESPIRATORY (INHALATION) EVERY 6 HOURS PRN
Status: DISCONTINUED | OUTPATIENT
Start: 2024-06-19 | End: 2024-06-21

## 2024-06-19 RX ORDER — INSULIN GLARGINE 100 [IU]/ML
20 INJECTION, SOLUTION SUBCUTANEOUS
Status: DISCONTINUED | OUTPATIENT
Start: 2024-06-19 | End: 2024-06-26 | Stop reason: HOSPADM

## 2024-06-19 RX ORDER — FUROSEMIDE 10 MG/ML
40 INJECTION INTRAMUSCULAR; INTRAVENOUS
Status: DISCONTINUED | OUTPATIENT
Start: 2024-06-19 | End: 2024-06-20

## 2024-06-19 RX ADMIN — EMPAGLIFLOZIN 10 MG: 10 TABLET, FILM COATED ORAL at 08:49

## 2024-06-19 RX ADMIN — ATORVASTATIN CALCIUM 10 MG: 10 TABLET, FILM COATED ORAL at 08:48

## 2024-06-19 RX ADMIN — FUROSEMIDE 40 MG: 10 INJECTION, SOLUTION INTRAMUSCULAR; INTRAVENOUS at 17:15

## 2024-06-19 RX ADMIN — BUDESONIDE AND FORMOTEROL FUMARATE DIHYDRATE 2 PUFF: 160; 4.5 AEROSOL RESPIRATORY (INHALATION) at 08:49

## 2024-06-19 RX ADMIN — INSULIN LISPRO 8 UNITS: 100 INJECTION, SOLUTION INTRAVENOUS; SUBCUTANEOUS at 18:56

## 2024-06-19 RX ADMIN — BUMETANIDE 6 MG: 2 TABLET ORAL at 08:48

## 2024-06-19 RX ADMIN — UMECLIDINIUM 1 PUFF: 62.5 AEROSOL, POWDER ORAL at 08:49

## 2024-06-19 RX ADMIN — INSULIN LISPRO 6 UNITS: 100 INJECTION, SOLUTION INTRAVENOUS; SUBCUTANEOUS at 14:42

## 2024-06-19 RX ADMIN — APIXABAN 5 MG: 5 TABLET, FILM COATED ORAL at 21:22

## 2024-06-19 RX ADMIN — INSULIN LISPRO 2 UNITS: 100 INJECTION, SOLUTION INTRAVENOUS; SUBCUTANEOUS at 21:22

## 2024-06-19 RX ADMIN — INSULIN LISPRO 8 UNITS: 100 INJECTION, SOLUTION INTRAVENOUS; SUBCUTANEOUS at 05:18

## 2024-06-19 RX ADMIN — BUMETANIDE 6 MG: 2 TABLET ORAL at 17:15

## 2024-06-19 RX ADMIN — ACETAMINOPHEN 650 MG: 325 TABLET, FILM COATED ORAL at 22:39

## 2024-06-19 RX ADMIN — METOPROLOL SUCCINATE 50 MG: 50 TABLET, EXTENDED RELEASE ORAL at 08:48

## 2024-06-19 RX ADMIN — INSULIN LISPRO 6 UNITS: 100 INJECTION, SOLUTION INTRAVENOUS; SUBCUTANEOUS at 00:59

## 2024-06-19 RX ADMIN — APIXABAN 5 MG: 5 TABLET, FILM COATED ORAL at 08:48

## 2024-06-19 RX ADMIN — SITAGLIPTIN 50 MG: 50 TABLET, FILM COATED ORAL at 08:49

## 2024-06-19 RX ADMIN — PREGABALIN 50 MG: 50 CAPSULE ORAL at 21:22

## 2024-06-19 RX ADMIN — INSULIN GLARGINE 20 UNITS: 100 INJECTION, SOLUTION SUBCUTANEOUS at 21:23

## 2024-06-19 RX ADMIN — INSULIN LISPRO 10 UNITS: 100 INJECTION, SOLUTION INTRAVENOUS; SUBCUTANEOUS at 10:45

## 2024-06-19 RX ADMIN — BUDESONIDE AND FORMOTEROL FUMARATE DIHYDRATE 2 PUFF: 160; 4.5 AEROSOL RESPIRATORY (INHALATION) at 17:18

## 2024-06-19 NOTE — OCCUPATIONAL THERAPY NOTE
Occupational Therapy Evaluation     Patient Name: Mesfin Cano  Today's Date: 6/19/2024  Problem List  Principal Problem:    Uncontrolled atrial flutter (HCC)  Active Problems:    Type 2 diabetes mellitus with hyperglycemia, without long-term current use of insulin (HCC)    Hyponatremia    CKD (chronic kidney disease) stage 3 (HCC)    Diabetic polyneuropathy associated with type 2 diabetes mellitus (HCC)    Chronic diastolic CHF (HCC)    Anemia due to chronic kidney disease    Hyperkalemia    Moderate persistent asthma without complication    Past Medical History  Past Medical History:   Diagnosis Date    Acute gastritis without hemorrhage     last assessed 3/24/17    Asthma     Atrial fibrillation (HCC)     Bilateral leg edema 02/19/2020    CHF (congestive heart failure) (HCC)     Coronary artery disease     Daytime sleepiness 07/17/2019    Diabetes mellitus (HCC)     Dyspnea on exertion 10/11/2021    Edema of both feet 01/23/2020    Gastric bypass status for obesity     Hyperlipidemia     Hypertension     Hypokalemia 11/14/2021    Impaired fasting glucose     last assessed 5/10/17    Knee sprain, bilateral 06/14/2018    Leg cramps 06/16/2022    Obesity     Viral gastroenteritis     last assessed 11/4/16     Past Surgical History  Past Surgical History:   Procedure Laterality Date    CARDIAC CATHETERIZATION N/A 3/9/2022    Procedure: CARDIAC RHC W/ PRESSURE SENSOR;  Surgeon: Aminata Wilcox MD;  [facility]: [facility];  Service: Cardiology    CARDIAC CATHETERIZATION N/A 9/6/2022    Procedure: Cardiac catheterization;  Surgeon: Yolanda Del Rosario DO;  Location: [facility];  Service: Cardiology    CARDIAC CATHETERIZATION N/A 9/6/2022    Procedure: Cardiac Coronary Angiogram;  Surgeon: Yolanda Del Rosario DO;  [facility]: [facility];  Service: Cardiology    CARDIAC CATHETERIZATION N/A 10/11/2023    Procedure: Cardiac RHC;  Surgeon: Yoel Darden MD;  Location: [facility];  Service:  Cardiology    CARPAL TUNNEL RELEASE      bilateral    CHOLECYSTECTOMY LAPAROSCOPIC N/A 2/7/2024    Procedure: CHOLECYSTECTOMY LAPAROSCOPIC;  Surgeon: Nena Ferguson MD;  Location: [facility];  Service: General    GASTRIC BYPASS  2004    with han en y    HERNIA REPAIR      ventral inscisional    IR BIOPSY BONE MARROW  12/14/2023    LAPAROSCOPIC TRANSGASTRIC ACCESS FOR ERCP N/A 2/7/2024    Procedure: LAPAROSCOPIC TRANSGASTRIC ACCESS FOR ERCP;  Surgeon: Nena Ferguson MD;  Location: [facility];  Service: General    LAPAROSCOPIC TRANSGASTRIC ACCESS FOR ERCP N/A 2/7/2024    Procedure: ERCP, sphincterotomy, stone extraction;  Surgeon: Rey Ferguson MD;  Location: [facility];  Service: Gastroenterology    TONSILLECTOMY             06/19/24 0817   OT Last Visit   OT Visit Date 06/19/24   Note Type   Note type Evaluation   Pain Assessment   Pain Assessment Tool 0-10   Pain Score No Pain   Hospital Pain Intervention(s) Repositioned;Ambulation/increased activity;Emotional support   Restrictions/Precautions   Weight Bearing Precautions Per Order No   Other Precautions Multiple lines;Telemetry;Fall Risk   Home Living   Type of Home House  (3  with 6 MADYSON)   Home Layout Multi-level;Bed/bath upstairs;Performs ADLs on one level;Able to live on main level with bedroom/bathroom;Access   Bathroom Shower/Tub Tub/shower unit   Bathroom Toilet Raised   Bathroom Equipment Other (Comment)  (No DME; reports that he is looking into getting a shower chair)   Bathroom Accessibility Accessible   Home Equipment Cane   Additional Comments Pt reports living with his wife and daughter in a 3  with 6 MADYSON, reporting that he stays on the first floor and sleeps in a recliner; no half bath on the 1st floor in which pt has to navigate full flight of stairs for bathroom; reports using SPC prn for functional mobility   Prior Function   Level of [place] Independent with ADLs;Independent with functional mobility;Independent with  "IADLS;Needs assistance with IADLS   Lives With Spouse;Daughter   Receives Help From Family   IADLs Independent with driving;Independent with meal prep;Independent with medication management   Falls in the last 6 months 1 to 4  (Pt reporting 3 recent falls)   Vocational Retired  (Pt reporting that he is trying to get disability)   Comments (+) driving; reporting that him and his wife share IADLs   Lifestyle   Autonomy PTA, pt was independent in ADLs, shares IADLs with wife, and uses SPC prn for functional mobility; (+) driving   Reciprocal Relationships Lives with his wife and daughter   Service to Others Retired, reports previously working as    Intrinsic Gratification Enjoys watching reality TV shows   Subjective   Subjective \"I have been to the hospital quite a few times.\"   ADL   Where Assessed Edge of bed   Eating Assistance 7  Independent   Grooming Assistance 7  Independent   UB Bathing Assistance 7  Independent   LB Bathing Assistance 5  Supervision/Setup   UB Dressing Assistance 7  Independent   LB Dressing Assistance 5  Supervision/Setup   Toileting Assistance  5  Supervision/Setup   Functional Assistance 5  Supervision/Setup   Bed Mobility   Supine to Sit Unable to assess   Sit to Supine Unable to assess   Additional Comments Upon arrival, pt found sitting EOB; @ end of session, pt left sitting EOB with all functional needs in reach   Transfers   Sit to Stand 5  Supervision   Stand to Sit 5  Supervision   Additional Comments w/ no DME   Functional Mobility   Functional Mobility 5  Supervision   Additional Comments Pt completed short household functional mobility distances @ supervision level w/ no DME in which pt requiring 2 standing rest breaks 2* SOB/fatigue   Balance   Static Sitting Good   Dynamic Sitting Fair +   Static Standing Fair   Dynamic Standing Fair   Ambulatory Fair -   Activity Tolerance   Activity Tolerance Patient limited by fatigue;Patient tolerated treatment well   Medical " Staff Made Aware GM Herrera   Nurse Made Aware RN cleared   RUE Assessment   RUE Assessment WFL   LUE Assessment   LUE Assessment WFL   Hand Function   Gross Motor Coordination Functional   Fine Motor Coordination Functional   Cognition   Overall Cognitive Status WFL   Arousal/Participation Alert;Responsive;Arousable;Cooperative   Attention Within functional limits   Orientation Level Oriented X4   Memory Within functional limits   Following Commands Follows all commands and directions without difficulty   Comments Pt pleasant and cooperative   Assessment   Prognosis Fair   Assessment Pt is a 58 yo male who presented to [facility] with SOB and intermittent chest pain in which pt dx w/ uncontrolled atrial flutter. Pt s/p cardioversion in ER with return to sinus rhythm. Pt  has a past medical history of Acute gastritis without hemorrhage, Asthma, Atrial fibrillation ([facility]), Bilateral leg edema (02/19/2020), CHF (congestive heart failure) ([facility]), Coronary artery disease, Daytime sleepiness (07/17/2019), Diabetes mellitus ([facility]), Dyspnea on exertion (10/11/2021), Edema of both feet (01/23/2020), Gastric bypass status for obesity, Hyperlipidemia, Hypertension, Hypokalemia (11/14/2021), Impaired fasting glucose, Knee sprain, bilateral (06/14/2018), Leg cramps (06/16/2022), Obesity, and Viral gastroenteritis.Pt with active OT orders and OT consulted to assess pt's functional status and occupational performance to determine safe d/c needs. Pt lives with his wife and daughter in a 3  with 6 [address]YS[address], reporting that he stays on the first floor. PTA, pt was independent in ADLs, shares IADLs with his wife, and uses SPC for functional mobility prn. (+) driving. Discussed role and scope of OT in which pt was receptive. At this time, pt is not demonstrating any significant occupational deficits and is functioning at a level of supervision for functional transfers/mobility w/ no DME and independent/Mod I for UB/LB ADLs. From an OT standpoint,  recommend discharge to home with increased support once medically stable. Pt was receptive regarding education on returning home safely and demonstrated good carryover during occupational/functional performance. At this time, pt demonstrates good insight/safety awareness and does not express any concerns regarding performing ADL/IADL/functional mobility tasks. No further skilled acute care OT services are needed at this time. The patient's raw score on the AM-PAC Daily Activity Inpatient Short Form is 24. A raw score of greater than or equal to 19 suggests the patient may benefit from discharge to home. Please refer to the recommendation of the Occupational Therapist for safe discharge planning.  Recommend continued engagement in ADL/functional mobility tasks with nursing and restorative therapy staff as appropriate to promote the highest level of independence prior to discharge. OT is discharging pt from caseload at this time, please reconsult if needed.   Goals   Patient Goals To go home   Plan   OT Frequency Eval only   Discharge Recommendation   Rehab Resource Intensity Level, OT No post-acute rehabilitation needs  (Would benefit from cardiac/pulmonary rehab when medically appropriate)   AM-PAC Daily Activity Inpatient   Lower Body Dressing 4   Bathing 4   Toileting 4   Upper Body Dressing 4   Grooming 4   Eating 4   Daily Activity Raw Score 24   Daily Activity Standardized Score (Calc for Raw Score >=11) 57.54   AM-PAC Applied Cognition Inpatient   Following a Speech/Presentation 4   Understanding Ordinary Conversation 4   Taking Medications 4   Remembering Where Things Are Placed or Put Away 4   Remembering List of 4-5 Errands 4   Taking Care of Complicated Tasks 4   Applied Cognition Raw Score 24   Applied Cognition Standardized Score 62.21   End of Consult   Education Provided Yes   Patient Position at End of Consult Seated edge of bed;All needs within reach   Nurse Communication Nurse aware of consult        Rosina Hernandez MS, OTR/L

## 2024-06-19 NOTE — ASSESSMENT & PLAN NOTE
Lab Results   Component Value Date    EGFR 40 06/19/2024    EGFR 49 06/18/2024    EGFR 48 02/11/2024    CREATININE 1.83 (H) 06/19/2024    CREATININE 1.54 (H) 06/18/2024    CREATININE 1.58 (H) 02/11/2024   Renal function at baseline  Monitor daily, avoid nephrotoxins

## 2024-06-19 NOTE — ASSESSMENT & PLAN NOTE
Lab Results   Component Value Date    HGBA1C 8.3 (H) 06/19/2024       Recent Labs     06/19/24  0053 06/19/24  0515 06/19/24  0946 06/19/24  1412   POCGLU 395* 306* 391* 268*         Blood Sugar Average: Last 72 hrs:  (P) 368.5    Monitor fingerstick glucose levels  Insulin sliding scale as needed, received 5u of regular insulin tonight  High dose ISS due to elevated blood glucose reasons likely due to steroid use

## 2024-06-19 NOTE — CONSULTS
Consultation - General Cardiology Team 2  Mesfin Cano 59 y.o. male MRN: [id number]  Unit/Bed#: CW2 211-02 Encounter: [id number]      Inpatient consult to Cardiology  Consult performed by: Roly Nogueira MD  Consult ordered by: Nancy Polo MD        PCP: Soo Chavez MD   Outpatient Cardiologist: Aminata Wilcox MD    History of Present Illness   Physician Requesting Consult: Faheem Banda DO  Reason for Consult / Principal Problem: New Atrial flutter, conversion in ED        Assessment & Plan     Assessment:  Patient is a 59-year-old M with PMH of HFpEF, A-fib on Eliquis, HTN, T2DM, CKD, asthma admitted for 1 week history of CP and SOB.  Findings include atrial flutter with RVR 140s, hyponatremia, chronic anemia, creatinine 1.8.  Interventions in ED include electrical cardioversion to normal sinus rhythm with resolution of symptoms.  Ongoing management for COPD exacerbation most likely in the setting of arrhythmia.  Plan for repeat echo, telemetry monitoring, and continue with anticoagulation and rate control.    ##Atrial flutter s/p cardioversion to NSR  ##Atrial fibrillation  Patient has a history of rate controlled atrial flutter fibrillation with metoprolol and Eliquis anticoagulation.  However, unsure when his last event of atrial fibrillation was.  Looking at prior ECG strips, they appear for the most part in sinus rhythm.  Patient recently held his metoprolol due to symptoms of dizziness/ lightheadedness.  Patient's electrolytes notable for hyponatremia of 133 on presentation, now down to 130.  Bicarb 20 with anion gap of 13.  Kidney function within his baseline.  TSH within normal limits.  ##Chest pain and SOB  Troponin elevated around 58-60, but without delta. CP characteristic c/f stable vs unstable angina. Prior LHC negative for CAD. ECHO currently pending. No ST changes on ECG. Unresponsive to NTG. No reproducible chest wall tenderness. CXR unremarkable. Thus, likely transient demand  ischemia from atrial flutter.   ##Electrolyte imbalance  Na 130 from 133. In setting of hyperglycemia. Corrected is 133-134 which is close to patient's baseline. Hyperglycemia most likely from solumedrol. K on presentation 5.4. Now 4.8 after albuterol and insulin. Spironolactone being held for this reason.   ##Hypertension  Currently within normal range.  Continue with metoprolol.  Previously prescribed Entresto but unable to afford the medication.  ## T2DM  Hyperglycemic.  Last A1c 12/2023 9.3.  Does not take insulin at home.  Jardiance 10 mg, Januvia 50 mg daily  ##HFpEF  HFpEF most likely from arrhythmia, HTN, obesity.  Last TTE on 4/2023 EF 50% G2 DD.  LHC on 9/2022 with no obstructive CAD. CardioMEMS Pad (which correlates to RHC) reading 15 on 5/22. History of admissions from acute heart failure.  Patient appears euvolemic.  Near his dry weight.  Takes Bumex 6 mg twice daily with metolazone 5 mg as needed.  ##Asthma/COPD  Cw primary management.       Plan:  - Continue on telemetry   - Orthostatic  - Cw eliquis and metoprolol  - STAT troponin/ECG in the event of cardiac symptoms  - Will discuss with team about possible consideration for repeat cath based on ECHO, which is still pending  - Continue with control over hyperglycemia      HPI: Mesfin Cano is a 59 y.o. year old male with a history of HFpEF, A-fib on Eliquis, HTN, HLD, T2DM, VICKY non-compliant with CPAP, CKD 3B (baseline creatinine 1.5-2.0), asthma, gastric bypass, gallstone pancreatitis presenting to the ED on 6/18 with CP and SOB which started 1 week PTA.  CP retrosternal pressure with radiation to right jaw. States that the pain woke him up several times over the past week. Worse when laying supine. Not associated with exertion.  Episodes lasting 5-15 minutes. No associated palpitation. No visual changes No cough or wheezing.  Rescue inhaler for asthma showing minimal relief.  Stopped taking his metoprolol for the past 2 days due to increased  dizziness and lightheadedness. Did not check his BP, but other times, his SBP around 130s. On the day of presentation, chest pressure noted to be lasting longer (1-1.5 hours), and thus decided to call EMS.  On route to the ED, he received aspirin and nitroglycerin which did not alleviate his CP.    On arrival to ED, ECG notable for atrial flutter with RVR to 140s and underwent electrical cardioversion, converting back to NSR.  Also reporting resolution of CP though still reporting ongoing mild SOB.  Receiving treatment for COPD exacerbation with Solu-Medrol.  Other significant lab findings include chronic anemia of 8.9, MCV 78. Hyponatremia 130, down from 133. K 5.4, received insulin and albuterol. Creatinine 1.83 up from 1.54.  Fasting glucose of 280.  Bicarb 20.  Troponin on presentation flat at 58.  TSH 2.9.  Notable medications include metoprolol succinate 50 mg daily, Eliquis 5 mg twice daily, atorvastatin 10 mg, Jardiance 10 mg, Januvia 50 mg daily.     Of note, patient has a cardioMEM pad in place (implanted 3/2022) with last reading of 15 on 5/22 (goal of 15-18). Takes metoprolol succinate 50 mg daily, spironolactone 25 mg daily, Jardiance 10 mg daily, Bumex 6 mg twice daily with metolazone 5 mg as needed. No prior h/o of ablation. No h/o of MI. Past LHC on 9/2022, no obstructive disease. Denies LE swelling and WT stable to lower than baseline.       Review of Systems   Constitutional:  Negative for activity change, appetite change, chills, fatigue, fever and unexpected weight change.   Eyes:  Negative for visual disturbance.   Respiratory:  Positive for shortness of breath. Negative for cough, choking and wheezing.    Cardiovascular:  Negative for chest pain, palpitations and leg swelling.   Gastrointestinal:  Negative for abdominal pain, blood in stool, constipation, diarrhea, nausea and vomiting.   Genitourinary:  Negative for dysuria and hematuria.   Musculoskeletal:  Negative for back pain.    Neurological:  Negative for dizziness, weakness, light-headedness and headaches.     ROS as noted above.       Historical Information   Past Medical History:   Diagnosis Date    Acute gastritis without hemorrhage     last assessed 3/24/17    Asthma     Atrial fibrillation (HCC)     Bilateral leg edema 02/19/2020    CHF (congestive heart failure) (HCC)     Coronary artery disease     Daytime sleepiness 07/17/2019    Diabetes mellitus (HCC)     Dyspnea on exertion 10/11/2021    Edema of both feet 01/23/2020    Gastric bypass status for obesity     Hyperlipidemia     Hypertension     Hypokalemia 11/14/2021    Impaired fasting glucose     last assessed 5/10/17    Knee sprain, bilateral 06/14/2018    Leg cramps 06/16/2022    Obesity     Viral gastroenteritis     last assessed 11/4/16     Past Surgical History:   Procedure Laterality Date    CARDIAC CATHETERIZATION N/A 3/9/2022    Procedure: CARDIAC RHC W/ PRESSURE SENSOR;  Surgeon: Aminata Wilcox MD;  Location: [facility];  Service: Cardiology    CARDIAC CATHETERIZATION N/A 9/6/2022    Procedure: Cardiac catheterization;  Surgeon: Yolanda Del Rosario DO;  Location: [facility];  Service: Cardiology    CARDIAC CATHETERIZATION N/A 9/6/2022    Procedure: Cardiac Coronary Angiogram;  Surgeon: Yolanda Del Rosario DO;  [facility];  Service: Cardiology    CARDIAC CATHETERIZATION N/A 10/11/2023    Procedure: Cardiac RHC;  Surgeon: Yoel Darden MD;  Location: [facility];  Service: Cardiology    CARPAL TUNNEL RELEASE      bilateral    CHOLECYSTECTOMY LAPAROSCOPIC N/A 2/7/2024    Procedure: CHOLECYSTECTOMY LAPAROSCOPIC;  Surgeon: Nena Ferguson MD;  Location: [facility];  Service: General    GASTRIC BYPASS  2004    with han en y    HERNIA REPAIR      ventral inscisional    IR BIOPSY BONE MARROW  12/14/2023    LAPAROSCOPIC TRANSGASTRIC ACCESS FOR ERCP N/A 2/7/2024    Procedure: LAPAROSCOPIC TRANSGASTRIC ACCESS FOR ERCP;  Surgeon:  Nena Ferguson MD;  Location: [facility];  Service: General    LAPAROSCOPIC TRANSGASTRIC ACCESS FOR ERCP N/A 2/7/2024    Procedure: ERCP, sphincterotomy, stone extraction;  Surgeon: Rey Ferguson MD;  Location: [facility];  Service: Gastroenterology    TONSILLECTOMY       Social History     Substance and Sexual Activity   Alcohol Use Yes    Alcohol/week: 2.0 standard drinks of alcohol    Types: 2 Shots of liquor per week    Comment: social     Social History     Substance and Sexual Activity   Drug Use No     Social History     Tobacco Use   Smoking Status Former    Types: Pipe, Cigars    Start date: 2016    Quit date: 1/1/2021    Years since quitting: 3.4    Passive exposure: Never   Smokeless Tobacco Never   Tobacco Comments    cigar once a day     Family History: non-contributory    Meds/Allergies   Hospital Medications:   Current Facility-Administered Medications   Medication Dose Route Frequency    acetaminophen (TYLENOL) tablet 650 mg  650 mg Oral Q6H PRN    apixaban (ELIQUIS) tablet 5 mg  5 mg Oral BID    atorvastatin (LIPITOR) tablet 10 mg  10 mg Oral Daily    budesonide-formoterol (SYMBICORT) 160-4.5 mcg/act inhaler 2 puff  2 puff Inhalation BID    bumetanide (BUMEX) tablet 6 mg  6 mg Oral BID    Empagliflozin (JARDIANCE) tablet 10 mg  10 mg Oral Daily    insulin lispro (HumALOG/ADMELOG) 100 units/mL subcutaneous injection 1-6 Units  1-6 Units Subcutaneous HS    insulin lispro (HumALOG/ADMELOG) 100 units/mL subcutaneous injection 2-12 Units  2-12 Units Subcutaneous 4 times day    levalbuterol (XOPENEX) inhalation solution 0.63 mg  0.63 mg Nebulization Q6H PRN    metoprolol succinate (TOPROL-XL) 24 hr tablet 50 mg  50 mg Oral Daily    pregabalin (LYRICA) capsule 50 mg  50 mg Oral HS    sitaGLIPtin (JANUVIA) tablet 50 mg  50 mg Oral Daily    umeclidinium 62.5 mcg/actuation inhaler AEPB 1 puff  1 puff Inhalation Daily     Home Medications:   Medications Prior to Admission:     Accu-Chek FastClix Lancets  MISC    acetaminophen (TYLENOL) 325 mg tablet    albuterol (PROVENTIL HFA,VENTOLIN HFA) 90 mcg/act inhaler    apixaban (Eliquis) 5 mg    atorvastatin (LIPITOR) 10 mg tablet    Blood Glucose Monitoring Suppl (Accu-Chek Guide) w/Device KIT    budesonide-formoterol (Symbicort) 160-4.5 mcg/act inhaler    bumetanide (BUMEX) 2 mg tablet    Empagliflozin (JARDIANCE) 10 MG TABS tablet    EPINEPHrine (EPIPEN) 0.3 mg/0.3 mL SOAJ    fluticasone (FLONASE) 50 mcg/act nasal spray    Klor-Con M20 20 MEQ tablet    loratadine (CLARITIN) 10 mg tablet    metolazone (ZAROXOLYN) 2.5 mg tablet    metoprolol succinate (TOPROL-XL) 50 mg 24 hr tablet    montelukast (SINGULAIR) 10 mg tablet    pregabalin (LYRICA) 50 mg capsule    sitaGLIPtin (Januvia) 100 mg tablet    spironolactone (ALDACTONE) 25 mg tablet    tiotropium (Spiriva Respimat) 1.25 MCG/ACT AERS inhaler    Allergies   Allergen Reactions    Azithromycin Other (See Comments)     Shaky, uneasy feeling     Bactrim [Sulfamethoxazole-Trimethoprim] Other (See Comments)     shakiness       Objective   Vitals: Blood pressure 136/76, pulse 81, temperature 98.7 °F (37.1 °C), resp. rate 18, weight (!) 150 kg (329 lb 9.6 oz), SpO2 95%.  Orthostatic Blood Pressures      Flowsheet Row Most Recent Value   Blood Pressure 136/76 filed at 06/19/2024 0759   Patient Position - Orthostatic VS Sitting filed at 06/18/2024 2000              Invasive Devices       Peripheral Intravenous Line  Duration             Peripheral IV 06/18/24 Left Arm 1 day              Drain  Duration             Closed/Suction Drain RUQ Bulb 15 Fr. 132 days                    Physical Exam  Constitutional:       General: He is not in acute distress.     Appearance: Normal appearance. He is obese. He is not ill-appearing, toxic-appearing or diaphoretic.   HENT:      Mouth/Throat:      Mouth: Mucous membranes are moist.      Pharynx: No oropharyngeal exudate.   Eyes:      Pupils: Pupils are equal, round, and reactive to light.    Cardiovascular:      Rate and Rhythm: Normal rate and regular rhythm.      Pulses: Normal pulses.      Heart sounds: Normal heart sounds. No murmur heard.  Pulmonary:      Effort: Pulmonary effort is normal. No respiratory distress.      Breath sounds: Normal breath sounds. No wheezing.   Abdominal:      General: Bowel sounds are normal.      Tenderness: There is no abdominal tenderness.   Musculoskeletal:         General: Swelling present. Normal range of motion.   Skin:     General: Skin is warm.      Capillary Refill: Capillary refill takes less than 2 seconds.   Neurological:      General: No focal deficit present.      Mental Status: He is alert and oriented to person, place, and time. Mental status is at baseline.   Psychiatric:         Mood and Affect: Mood normal.         Behavior: Behavior normal.         Lab Results: HS Troponin:   0   Lab Value Date/Time    HSTNI 13 04/12/2023 2043    HSTNI0 58 (H) 06/18/2024 2245    HSTNI2 57 (H) 06/19/2024 0058    HSTNI4 60 (H) 06/19/2024 0313    HSTNI4 14 02/04/2024 1857    HSTNI4 195 (H) 11/27/2023 1557    HSTNI4 16 10/06/2023 2209    HSTNI4 18 07/23/2023 1838    HSTNI4 17 04/10/2023 2034    HSTNI4 21 09/01/2022 1942    HSTNI4 12 07/29/2022 1752    HSTNI4 16 03/01/2022 1128    HSTNI4 16 11/10/2021 0141     BNP:   Results from last 7 days   Lab Units 06/19/24  0338   POTASSIUM mmol/L 4.8   CHLORIDE mmol/L 97   CO2 mmol/L 20*   BUN mg/dL 43*   CREATININE mg/dL 1.83*   CALCIUM mg/dL 8.6   EGFR ml/min/1.73sq m 40     TSH:   Results from last 7 days   Lab Units 06/18/24  1835   TSH 3RD GENERATON uIU/mL 2.896     Magnesium:         Results from last 7 days   Lab Units 06/19/24  0338 06/18/24  1835   POTASSIUM mmol/L 4.8 5.4*   CO2 mmol/L 20* 17*   CHLORIDE mmol/L 97 102   BUN mg/dL 43* 33*   CREATININE mg/dL 1.83* 1.54*     Results from last 7 days   Lab Units 06/19/24  0359 06/18/24  1835   HEMOGLOBIN g/dL 8.7* 8.9*   HEMATOCRIT % 29.5* 30.7*   PLATELETS Thousands/uL 347  339                 Imaging: I have personally reviewed pertinent reports.      Holter/Telemetry:   No new events    ECHO: Results for orders placed during the hospital encounter of 21    Echo complete with contrast if indicated    Narrative  [address] [id number] [phone]    Transthoracic Echocardiogram  2D, M-mode, Doppler, and Color Doppler    Study date:  2021    Patient: KARY GRANADOS  MR number: [id number]  Account number: [id number]  : 1965  Age: 56 years  Gender: Male  Status: Inpatient  Location: Bedside  Height: 73 in  Weight: 359.3 lb  BP: 177/ 91 mmHg    Indications: Assess congestive heart failure.    Diagnoses: I42.9 - Cardiomyopathy, unspecified    Sonographer:  TOSHIA Foster  Primary Physician:  Soo Chavez MD  Group:  [facility]  Cardiology Fellow:  Babak Badillo  Interpreting Physician:  Rohith Syed MD    SUMMARY    LEFT VENTRICLE:  Size was at the upper limits of normal.  Systolic function was at the lower limits of normal. Ejection fraction was estimated to be 50 %.  There were no regional wall motion abnormalities.  There was akinesis of the basal inferior and basal-mid inferolateral wall(s).  There was no evidence of concentric hypertrophy.  Doppler parameters were consistent with abnormal left ventricular relaxation (grade 1 diastolic dysfunction).    LEFT ATRIUM:  The atrium was mildly dilated.    MITRAL VALVE:  There was mild annular calcification.  There was trace regurgitation.    TRICUSPID VALVE:  There was trace regurgitation.    IVC, HEPATIC VEINS:  The inferior vena cava was mildly dilated.    PROCEDURE: The procedure was performed at the bedside. This was a routine study. The transthoracic approach was used. The study included complete 2D imaging, M-mode, complete spectral Doppler, and color Doppler. The heart rate was 87 bpm,  at the start of the  study. Images were obtained from the parasternal, apical, subcostal, and suprasternal notch acoustic windows. Echocardiographic views were limited due to restricted patient mobility, decreased penetration, and lung  interference. This was a technically difficult study.    LEFT VENTRICLE: Size was at the upper limits of normal. Systolic function was at the lower limits of normal. Ejection fraction was estimated to be 50 %. There were no regional wall motion abnormalities. There was akinesis of the basal  inferior and basal-mid inferolateral wall(s). Wall thickness was normal. There was no evidence of concentric hypertrophy. DOPPLER: Doppler parameters were consistent with abnormal left ventricular relaxation (grade 1 diastolic dysfunction).    RIGHT VENTRICLE: The size was normal. Systolic function was normal. Wall thickness was normal.    LEFT ATRIUM: The atrium was mildly dilated.    RIGHT ATRIUM: Size was normal.    MITRAL VALVE: There was mild annular calcification. Valve structure was normal. There was normal leaflet separation. DOPPLER: The transmitral velocity was within the normal range. There was no evidence for stenosis. There was trace  regurgitation.    AORTIC VALVE: The valve was trileaflet. Leaflets exhibited mildly increased thickness, mild calcification, and normal cuspal separation. DOPPLER: Transaortic velocity was within the normal range. There was no evidence for stenosis. There  was no significant regurgitation.    TRICUSPID VALVE: The valve structure was normal. There was normal leaflet separation. DOPPLER: The transtricuspid velocity was within the normal range. There was no evidence for stenosis. There was trace regurgitation.    PULMONIC VALVE: Leaflets exhibited normal thickness, no calcification, and normal cuspal separation. DOPPLER: The transpulmonic velocity was within the normal range. There was no significant regurgitation.    PERICARDIUM: There was no pericardial effusion. The  pericardium was normal in appearance.    AORTA: The root exhibited normal size. The ascending aorta was normal in size.    SYSTEMIC VEINS: IVC: The inferior vena cava was mildly dilated. Respirophasic changes were normal.    SYSTEM MEASUREMENT TABLES    2D  %FS: 21.53 %  Ao Diam: 3.19 cm  Ao asc: 3.32 cm  EDV(Teich): 188.84 ml  EF(Teich): 42.83 %  ESV(Teich): 107.95 ml  IVSd: 0.8 cm  LA Area: 19.94 cm2  LA Diam: 4.66 cm  LVEDV MOD A4C: 126.08 ml  LVEF MOD A4C: 46.23 %  LVESV MOD A4C: 67.8 ml  LVIDd: 6.13 cm  LVIDs: 4.81 cm  LVLd A4C: 8.23 cm  LVLs A4C: 7.32 cm  LVPWd: 0.78 cm  RA Area: 20.73 cm2  RVIDd: 3.74 cm  SV MOD A4C: 58.28 ml  SV(Teich): 80.89 ml    MM  TAPSE: 2.77 cm    PW  E' Sept: 0.08 m/s  E/E' Sept: 9.01  MV A Zach: 0.84 m/s  MV Dec [place]: 3.3 m/s2  MV DecT: 228.2 ms  MV E Zach: 0.75 m/s  MV E/A Ratio: 0.89  MV PHT: 66.18 ms  MVA By PHT: 3.32 cm2    [facility] Accredited Echocardiography Laboratory    Prepared and electronically signed by    Rohith Syed MD  Signed 20-Jul-2021 07:13:15      CATH/STRESS TEST:   2022 no obstructive CAD    EKG:   Date: 6/18/2024 - 6/19/2024  Interpretation: 2:1 aflutter converted to NSR after cardioversion      VTE Prophylaxis: RX contraindicated due to: on eliquis            Case discussed and reviewed with Dr. Mallory who agrees with my assessment and plan.    Thank you for involving us in the care of your patient.      Roly Nogueira MD PGY-1  [facility]      ==========================================================================================    Counseling / Coordination of Care  Total floor / unit time spent today 45 minutes minutes.  Greater than 50% of total time was spent with the patient and / or family counseling and / or coordination of care.        ** Please Note: Fluency DirectDictation voice to text software may have been used in the creation of this document. **

## 2024-06-19 NOTE — ASSESSMENT & PLAN NOTE
Patient with a hx of asthma, had PFT 2021 showing severe reversible obstruction  He is currently using albuterol inh, symbicort and spiriva, he has had to increase albuterol use in the last 1 week  No increase in cough or sputum production, no upper respiratory sxs  Here is is on room air, not using accessory muscle. Lungs sound clear w/o wheezing   Due to dyspnea and suspcion for asthma exacerbation he received solumedrol, duoneb, magnesium   Clinically I do not think this is asthma exacerbation and rather uncontrolled arrythmia  Will hold off further steroids, moreover blood glucose is now elevated  Continue with home bronchodilators symbicort, xopenex in place of albuterol and atrovent  Follow up CXR

## 2024-06-19 NOTE — PLAN OF CARE
Problem: PHYSICAL THERAPY ADULT  Goal: Performs mobility at highest level of function for planned discharge setting.  See evaluation for individualized goals.  Description:            See flowsheet documentation for full assessment, interventions and recommendations.  6/19/2024 1015 by Sharon Guerin PT  Note: Prognosis: Good  Problem List: Decreased strength, Decreased endurance, Impaired balance, Decreased mobility  Assessment: Pt is a 59 y.o. male seen for PT evaluation s/p admit to [facility] on 6/18/2024. Pt was admitted with a primary dx of: uncontrolled A flutter.  PT now consulted for assessment of mobility and d/c needs. Pt with Up and OOB as tolerated  orders.  Pts current comorbidities effecting treatment include: type 2 DM, polyneuropathy, anemia, asthma and CKD. Pts current clinical presentation is Unstable/ Unpredictable (high complexity) due to Ongoing medical management for primary dx, Decreased activity tolerance compared to baseline, Fall risk, Increased assistance needed from caregiver at current time, Continuous pulse oximetry monitoring . Prior to admission, pt was living in [facility] and was IND in mobility. Upon evaluation, pt currently is requiring SUP for transfers; SUP for ambulation 40 ft w/ no AD. Pt presents at PT eval functioning below baseline and currently w/ overall mobility deficits 2* to: BLE weakness, impaired balance, decreased endurance, gait deviations, decreased activity tolerance compared to baseline, decreased functional mobility tolerance compared to baseline, fall risk, SOB upon exertion. Pt currently at a fall risk 2* to impairments listed above.  Pt will continue to benefit from skilled acute PT interventions to address stated impairments; to maximize functional mobility; for ongoing pt/ family training; and DME needs. At conclusion of PT session pt returned BTB and all needs in reach with phone and call bell within reach. Pt denies any further questions at this  time. The patient's AM-PAC Basic Mobility Inpatient Short Form Raw Score is 22. A Raw score of greater than 16 suggests the patient may benefit from discharge to home. Please also refer to the recommendation of the Physical Therapist for safe discharge planning. Recommend home w/ HHPT upon hospital D/C.  Barriers to Discharge: Inaccessible home environment     Rehab Resource Intensity Level, PT: III (Minimum Resource Intensity)    See flowsheet documentation for full assessment.

## 2024-06-19 NOTE — ASSESSMENT & PLAN NOTE
Patient with a hx of asthma, had PFT 2021 showing severe reversible obstruction  He is currently using albuterol inh, symbicort and spiriva, he has had to increase albuterol use in the last 1 week  No increase in cough or sputum production, no upper respiratory sxs  Here is is on room air, not using accessory muscle. Lungs sound clear w/o wheezing   Due to dyspnea and suspcion for asthma exacerbation he received solumedrol, duoneb, magnesium   Clinically I do not think this is asthma exacerbation and rather uncontrolled arrythmia  Will hold off further steroids, moreover blood glucose is now elevated  Continue with home bronchodilators symbicort, xopenex in place of albuterol and atrovent  Follow up CXR      10-Mar-2024 20:00

## 2024-06-19 NOTE — UTILIZATION REVIEW
Initial Clinical Review    Pt initially admitted as Observation on 6/18 converted to Inpatient on 6/20.  Pt requiring continued stay due to Uncontrolled Atrial Flutter.     Admission: Date/Time/Statement:   Admission Orders (From admission, onward)       Ordered        06/20/24 1254  INPATIENT ADMISSION  Once            06/18/24 2158  Place in Observation  Once                          Orders Placed This Encounter   Procedures    INPATIENT ADMISSION     Standing Status:   Standing     Number of Occurrences:   1     Order Specific Question:   Level of Care     Answer:   Med Surg [16]     Order Specific Question:   Estimated length of stay     Answer:   More than 2 Midnights     Order Specific Question:   Certification     Answer:   I certify that inpatient services are medically necessary for this patient for a duration of greater than two midnights. See H&P and MD Progress Notes for additional information about the patient's course of treatment.     ED Arrival Information       Expected   -    Arrival   6/18/2024 17:44    Acuity   Urgent              Means of arrival   Ambulance    Escorted by   [facility]    Service   Hospitalist    Admission type   Emergency              Arrival complaint   Chest Pain             Chief Complaint   Patient presents with    Chest Pain     Pt presents by [facility] ambulance with c/o midsternal chest pain radiating to R jaw intermittent all week. Hx a fib and CHF, given 1 nitro and ASA by EMS       Initial Presentation: 59 y.o. male with PMHx: Asthma, HFpEF, Atrial fibrillation, who presented on 6/18 to [facility] ED via EMS initially admitted Observation status then converted to Inpatient due to Uncontrolled Atrial Flutter.  Presented due to intermittent mid sternal chest pain with radiation to right side of chest and right jaw, also c/o shortness of breath for one week. Minimal relief with inhaler. During this time he felt dizzy and lightheaded after taking his  metoprolol medication which he discontinued taking for the last 2 days.  Today while he was watching TV the chest pain returned and lasted 1 to 1.5 hrs which prompted him to come to the emergency room.  En route to the emergency room he received aspirin and nitroglycerin which did not alleviate his chest pain.     In the ED, tachycardic in the 140s.  EKG notable for atrial flutter. He promptly underwent cardioversion in the ED with return to sinus rhythm. Due to shortness of breath it was a concern for COPD exacerbation and he received Solu-Medrol, DuoNeb, magnesium and will be admitted for further management.     Plan: med surg telemetry, Cardiology consult, order echo, fu on TSH, trops, continue metoprolol and Eliquis, home inhalers and neb txs, fu on CXR. Hold potassium and aldactone due to hyperkalemia. Start accuchecks w/ SSI, monitor renal function and monitor electrolytes and replete prn.PT OT eval.    6/19 Per Cardiology: Atrial flutter: New diagnosis-this is typical atrial flutter, if it recurs, should consider ablation as a curative strategy. Normal TSH. Missed his metoprolol for a couple of days prior, this might have increased catecholamine response as well.  Chronic heart failure with preserved ejection fraction: At baseline on Bumex 6 twice daily and metolazone as needed with a baseline dry weight around 332 pounds.  Appears euvolemic but feels a little short of breath-more than usual, missed his Bumex coming to the hospital yesterday, recently given extra dose of 40 mg IV Lasix today while continuing his baseline Bumex dose. CardioMEMS in place and follows with heart failure as an outpatient  Hypertension: Controlled  Diabetes: Last A1c of 8.3, needs better control long-term.  Dyslipidemia: On low-dose atorvastatin, most recently current LDL of 86, non-HDL of 125, needs better control, should uptitrate to at least 40 mg     6/20/2024 Inpatient Admission:   Pt with episode of chest discomfort described  as a pressure radiating to the right side of his chest, denied any diaphoresis nausea vomiting lightheadedness dizziness palpitation. Cardiology following, fu on echo, trend trops, continue telemetry, Eliquis, hold metoprolol today, stat trop and EKG with any cardiac symptoms.     Date:  6/21   Day 2:  given 1 x lasix 40 IV yesterday with good UOP, but now dry and with soft BP. Had similar CP overnight with flat troponin, and ECG revealing irregularly regular rhythm. Brief episodes of bradycardia on telemetry; no further events of atrial flutter. Pending ECHO read, Cardiology following. Continue to try and ambulate patient to see if SOB/CP is induced by exertion. Plan to restart bumex this AM in setting of improving ANTONIO and soft BP. Continue to monitor VS, labs, accuchecks.     ED Triage Vitals   Temperature Pulse Respirations Blood Pressure SpO2 Pain Score   06/18/24 1753 06/18/24 1753 06/18/24 1753 06/18/24 1753 06/18/24 1753 06/18/24 2200   97.9 °F (36.6 °C) (!) 138 20 135/79 97 % No Pain     Weight (last 2 days)       Date/Time Weight    06/21/24 0500 148 (326.8)    06/20/24 0915 149 (329)    06/20/24 0530 149 (329.59)    06/19/24 0600 150 (329.6)            Vital Signs (last 3 days)       Date/Time Temp Pulse Resp BP MAP (mmHg) SpO2 Calculated FIO2 (%) - Nasal Cannula Nasal Cannula O2 Flow Rate (L/min) O2 Device Patient Position - Orthostatic VS Pain    06/21/24 11:06:02 -- 80 -- 104/62 76 96 % -- -- -- -- --    06/21/24 08:18:42 -- 75 -- 100/61 74 98 % -- -- -- -- --    06/21/24 0815 -- -- -- -- -- 97 % -- -- None (Room air) -- No Pain    06/21/24 0605 97.5 °F (36.4 °C) 70 18 113/55 74 97 % -- -- None (Room air) Lying --    06/21/24 02:44:45 -- 86 -- 113/52 72 97 % -- -- -- -- --    06/20/24 22:57:54 98.4 °F (36.9 °C) 56 16 130/60 83 98 % -- -- None (Room air) Lying --    06/20/24 21:58:15 98.5 °F (36.9 °C) 64 -- 121/63 82 98 % -- -- -- -- --    06/20/24 19:36:29 98.3 °F (36.8 °C) 66 16 115/65 82 98 % -- --  None (Room air) Sitting No Pain    06/20/24 1637 -- -- -- -- -- 98 % -- -- None (Room air) -- No Pain    06/20/24 15:20:17 97.5 °F (36.4 °C) 71 -- 113/65 81 97 % -- -- None (Room air) Sitting No Pain    06/20/24 14:43:30 -- 51 18 117/58 78 98 % -- -- -- -- --    06/20/24 14:28:55 -- -- -- 121/67 85 -- -- -- -- Standing - Orthostatic VS --    06/20/24 14:27:18 -- -- -- 122/70 87 -- -- -- -- Sitting - Orthostatic VS --    06/20/24 14:26:05 -- -- -- 121/72 88 -- -- -- -- Lying - Orthostatic VS --    06/20/24 0915 -- 74 -- 104/64 -- -- -- -- -- -- --    06/20/24 0900 -- -- -- -- -- -- 20 0 L/min None (Room air) -- --    06/20/24 0849 -- -- -- 104/64 -- -- -- -- -- -- --    06/20/24 0440 97.6 °F (36.4 °C) 74 -- 91/56 68 95 % -- -- -- -- --    06/20/24 04:39:43 97.6 °F (36.4 °C) 72 -- 91/56 68 95 % -- -- -- -- --    06/20/24 0300 -- -- -- -- -- 96 % -- -- None (Room air) -- --    06/19/24 2239 -- -- -- -- -- -- -- -- -- -- 3    06/19/24 1928 -- -- -- -- -- -- -- -- -- -- No Pain    06/19/24 16:56:27 98.9 °F (37.2 °C) 85 -- 122/75 91 95 % -- -- -- -- --    06/19/24 10:45:32 -- 90 -- 122/72 89 96 % -- -- -- Standing - Orthostatic VS --    06/19/24 10:44:30 -- 86 -- 125/73 90 96 % -- -- -- Sitting - Orthostatic VS --    06/19/24 10:43:22 -- 86 -- 126/73 91 94 % -- -- -- Lying - Orthostatic VS --    06/19/24 0854 -- -- -- -- -- -- -- -- None (Room air) -- No Pain    06/19/24 0819 -- -- -- -- -- -- -- -- -- -- No Pain    06/19/24 0817 -- -- -- -- -- -- -- -- -- -- No Pain    06/19/24 07:59:15 98.7 °F (37.1 °C) 81 -- 136/76 96 95 % -- -- -- -- --    06/19/24 03:16:32 -- 86 -- 139/78 98 95 % -- -- -- -- --    06/18/24 2200 -- 102 -- 131/62 90 96 % -- -- -- -- No Pain    06/18/24 2000 -- 92 18 117/57 80 100 % -- -- None (Room air) Sitting --    06/18/24 1900 -- 88 -- 112/57 81 94 % -- -- None (Room air) Sitting --    06/18/24 18:51:32 -- 92 -- 115/58 78 97 % -- -- None (Room air) Sitting --    06/18/24 1851 -- 90 -- 116/58 79 96  % -- -- -- -- --    06/18/24 18:45:18 -- 90 18 116/58 79 94 % 36 4 L/min Nasal cannula Sitting --    06/18/24 18:41:30 -- 146 -- 125/76 95 97 % -- -- -- Sitting --    06/18/24 18:39:56 -- 144 20 123/69 91 97 % -- -- -- Sitting --    06/18/24 18:39:04 -- -- -- -- -- -- -- -- Nasal cannula -- --    06/18/24 1753 97.9 °F (36.6 °C) 138 20 135/79 96 97 % -- -- None (Room air) -- --              Pertinent Labs/Diagnostic Test Results:   Radiology:  XR chest portable   Final Interpretation by Shar Dunbar MD (06/19 1441)      No acute cardiopulmonary disease.            Workstation performed: [id number]           Cardiology:  ECG 12 lead    by Interface, Ris Results In (06/19 2237)      ECG 12 lead   Final Result by Wm Campbell DO (06/19 1447)   Normal sinus rhythm   Nonspecific ST abnormality   Abnormal ECG   When compared with ECG of 19-JUN-2024 00:56, (unconfirmed)   No significant change was found   Confirmed by Wm Campbell (Misty) on 6/19/2024 2:47:37 PM      ECG 12 lead   Final Result by Wm Campbell DO (06/19 1447)   Normal sinus rhythm   Normal ECG   When compared with ECG of 19-JUN-2024 00:55, (unconfirmed)   Premature atrial complexes are no longer Present   Confirmed by Wm Campbell (Misty) on 6/19/2024 2:47:34 PM      ECG 12 lead   Final Result by Wm Campbell DO (06/19 1447)   Sinus rhythm with Premature atrial complexes   Nonspecific ST abnormality   Prolonged QT   Abnormal ECG   When compared with ECG of 18-JUN-2024 18:44, (unconfirmed)   Premature ventricular complexes are no longer Present   Confirmed by Wm Campbell (Misty) on 6/19/2024 2:47:31 PM      ECG 12 lead   Final Result by Wm Campbell DO (06/19 1447)   ** Age and gender specific ECG analysis **   Sinus rhythm with Premature supraventricular complexes with occasional and    consecutive Premature ventricular complexes   Low voltage QRS   Abnormal ECG   When compared with ECG of 18-JUN-2024  17:54,      Confirmed by Wm Campbell ([id number]) on 6/19/2024 2:47:26 PM        GI:  No orders to display           Results from last 7 days   Lab Units 06/21/24  0434 06/19/24  0359 06/18/24  1835   WBC Thousand/uL 8.40 8.92 8.45   HEMOGLOBIN g/dL 7.9* 8.7* 8.9*   HEMATOCRIT % 26.4* 29.5* 30.7*   PLATELETS Thousands/uL 291 347 339   TOTAL NEUT ABS Thousands/µL 6.45  --  6.50         Results from last 7 days   Lab Units 06/21/24  0434 06/20/24  0436 06/19/24  0338 06/18/24  1835   SODIUM mmol/L 138 134* 130* 133*   POTASSIUM mmol/L 3.4* 3.5 4.8 5.4*   CHLORIDE mmol/L 101 99 97 102   CO2 mmol/L 25 24 20* 17*   ANION GAP mmol/L 12 11 13 14*   BUN mg/dL 56* 63* 43* 33*   CREATININE mg/dL 1.90* 2.21* 1.83* 1.54*   EGFR ml/min/1.73sq m 38 31 40 49   CALCIUM mg/dL 7.6* 7.8* 8.6 8.5   MAGNESIUM mg/dL  --  2.5  --   --          Results from last 7 days   Lab Units 06/21/24  1038 06/21/24  0749 06/20/24  2117 06/20/24  1652 06/20/24  1419 06/20/24  0853 06/20/24  0610 06/19/24  2120 06/19/24  1851 06/19/24  1655 06/19/24  1412 06/19/24  0946   POC GLUCOSE mg/dl 202* 134 153* 113 187* 277* 162* 216* 306* 228* 268* 391*     Results from last 7 days   Lab Units 06/21/24  0434 06/20/24  0436 06/19/24  0338 06/18/24  1835   GLUCOSE RANDOM mg/dL 147* 172* 283* 223*         Results from last 7 days   Lab Units 06/19/24  0313   HEMOGLOBIN A1C % 8.3*   EAG mg/dl 192     Results from last 7 days   Lab Units 06/20/24  0438 06/20/24  0216 06/19/24  2351 06/19/24  0313 06/19/24  0058 06/18/24  2245   HS TNI 0HR ng/L  --   --  56*  --   --  58*   HS TNI 2HR ng/L  --  55*  --   --  57*  --    HSTNI D2 ng/L  --  -1  --   --  -1  --    HS TNI 4HR ng/L 56*  --   --  60*  --   --    HSTNI D4 ng/L 0  --   --  2  --   --      Results from last 7 days   Lab Units 06/18/24  1835   TSH 3RD GENERATON uIU/mL 2.896       ED Treatment-Medication Administration from 06/18/2024 1744 to 06/18/2024 2309         Date/Time Order Dose Route Action      06/18/2024 1759 aspirin chewable tablet 324 mg 0 mg Does not apply Given to EMS     06/18/2024 1843 propofol (DIPRIVAN) 200 MG/20ML bolus injection 100 mg 100 mg Intravenous Given     06/18/2024 1854 magnesium sulfate 2 g/50 mL IVPB (premix) 2 g 2 g Intravenous New Bag     06/18/2024 1949 ipratropium-albuterol (DUO-NEB) 0.5-2.5 mg/3 mL inhalation solution 3 mL 3 mL Nebulization Given     06/18/2024 1949 methylPREDNISolone sodium succinate (Solu-MEDROL) injection 125 mg 125 mg Intravenous Given     06/18/2024 2241 insulin lispro (HumALOG/ADMELOG) 100 units/mL subcutaneous injection 1-5 Units 4 Units Subcutaneous Given            Past Medical History:   Diagnosis Date    Acute gastritis without hemorrhage     last assessed 3/24/17    Asthma     Atrial fibrillation ([facility])     Bilateral leg edema 02/19/2020    CHF (congestive heart failure) ([facility])     Coronary artery disease     Daytime sleepiness 07/17/2019    Diabetes mellitus ([facility])     Dyspnea on exertion 10/11/2021    Edema of both feet 01/23/2020    Gastric bypass status for obesity     Hyperlipidemia     Hypertension     Hypokalemia 11/14/2021    Impaired fasting glucose     last assessed 5/10/17    Knee sprain, bilateral 06/14/2018    Leg cramps 06/16/2022    Obesity     Viral gastroenteritis     last assessed 11/4/16     Present on Admission:   Chronic diastolic CHF (HCC)   Diabetic polyneuropathy associated with type 2 diabetes mellitus ([facility])   Anemia due to chronic kidney disease   CKD (chronic kidney disease) stage 3 ([facility])   Type 2 diabetes mellitus with hyperglycemia, without long-term current use of insulin ([facility])   Hyponatremia      Admitting Diagnosis: Chest pain [R07.9]  Dyspnea on exertion [R06.09]  Atrial flutter with rapid ventricular response (HCC) [I48.92]  Age/Sex: 59 y.o. male  Admission Orders:  Scheduled Medications:  apixaban, 5 mg, Oral, BID  atorvastatin, 10 mg, Oral, Daily  budesonide-formoterol, 2 puff, Inhalation, BID  bumetanide, 6 mg, Oral,  BID  Empagliflozin, 10 mg, Oral, Daily  insulin glargine, 20 Units, Subcutaneous, HS  insulin lispro, 1-6 Units, Subcutaneous, HS  insulin lispro, 2-12 Units, Subcutaneous, 4 times day  insulin lispro, 5 Units, Subcutaneous, TID With Meals  metoprolol succinate, 50 mg, Oral, Daily  pregabalin, 50 mg, Oral, HS  umeclidinium, 1 puff, Inhalation, Daily      Continuous IV Infusions:     PRN Meds:  acetaminophen, 650 mg, Oral, Q6H PRN  albuterol, 2.5 mg, Nebulization, Q4H PRN        IP CONSULT TO CARDIOLOGY    Network Utilization Review Department  ATTENTION: Please call with any questions or concerns to [phone] and carefully listen to the prompts so that you are directed to the right person. All voicemails are confidential.   For Discharge needs, contact Care Management DC Support Team at [phone] opt. 2  Send all requests for admission clinical reviews, approved or denied determinations and any other requests to dedicated fax number below belonging to the [facility] where the patient is receiving treatment. List of dedicated fax numbers for the Facilities:  FACILITY NAME UR FAX NUMBER   ADMISSION DENIALS (Administrative/Medical Necessity) [phone]   DISCHARGE SUPPORT TEAM (NETWORK) [phone]   PARENT CHILD HEALTH (Maternity/NICU/Pediatrics) [phone]   [facility] [phone]   [facility] [phone]   [facility] [phone]   [facility] [phone]   [facility] [phone]   [facility] [phone]   [facility] [phone]   [facility] [phone]   [facility] [phone]   [facility] [phone]   [facility] [phone]   [facility] [phone]

## 2024-06-19 NOTE — ASSESSMENT & PLAN NOTE
Patient presented with 1 week history of shortness of breath and chest pain  On presentation to emergency room he was noted to have atrial flutter with heart rate in the 140s  He is status post electrical cardioversion in the ED with return to normal sinus rhythm  He admits to not taking his metoprolol the last 2 days due to feeling lightheaded/dizzy - suspect due to tachycardia induced hypotension worsened by BB  Place in observation unit  Cardiac telemetry  Follow-up TSH, troponins w/EKG  Echocardiogram   Continue with metoprolol 50 mg daily, first dose now  Continue with Eliquis 5 mg twice daily  Cardiology input in AM

## 2024-06-19 NOTE — ASSESSMENT & PLAN NOTE
Patient presented with 1 week history of shortness of breath and chest pain  On presentation to emergency room he was noted to have atrial flutter with heart rate in the 140s  He is status post electrical cardioversion in the ED with return to normal sinus rhythm  He admits to not taking his metoprolol the last 2 days due to feeling lightheaded/dizzy - suspect due to tachycardia induced hypotension worsened by BB  Place in observation unit  Cardiac telemetry  Follow-up TSH, troponins w/EKG  Echocardiogram   Continue with metoprolol 50 mg daily, first dose now  Continue with Eliquis 5 mg twice daily  Should cardiology input will continue on home beta-blocker and monitor on telemetry for further episodes of atrial flutter, if this occurs we will have discussion about ablation

## 2024-06-19 NOTE — ASSESSMENT & PLAN NOTE
Lab Results   Component Value Date    HGBA1C 9.3 (H) 12/28/2023       Recent Labs     06/18/24  2240   POCGLU 390*       Blood Sugar Average: Last 72 hrs:  (P) 390  Continue with lyrica nightly

## 2024-06-19 NOTE — ASSESSMENT & PLAN NOTE
Mild. 5.4  EKG w/o changes   Received 5u regular insulin and albuterol  Hold potassium supplement and aldactone for today  Repeat potassium level

## 2024-06-19 NOTE — ASSESSMENT & PLAN NOTE
Lab Results   Component Value Date    HGBA1C 9.3 (H) 12/28/2023       Recent Labs     06/18/24  2240 06/18/24  2316   POCGLU 390* 461*       Blood Sugar Average: Last 72 hrs:  (P) 425.5    Monitor fingerstick glucose levels  Insulin sliding scale as needed, received 5u of regular insulin tonight  High dose ISS due to elevated blood glucose reasons likely due to steroid use

## 2024-06-19 NOTE — ASSESSMENT & PLAN NOTE
Wt Readings from Last 3 Encounters:   06/19/24 (!) 150 kg (329 lb 9.6 oz)   05/24/24 (!) 150 kg (331 lb 9.6 oz)   02/20/24 (!) 151 kg (332 lb 12.8 oz)     Currently appears euvolemic  Daily weights  Continue with bumex 6mg BID  Takes metolazone only with weight gain, will hold for now  Continue to hold Aldactone

## 2024-06-19 NOTE — CASE MANAGEMENT
Case Management Discharge Planning Note    Patient name Mesfin Cano  Location 2 /CW2 211-02 MRN [id number]  : 1965 Date 2024       Current Admission Date: 2024  Current Admission Diagnosis:Uncontrolled atrial flutter (HCC)   Patient Active Problem List    Diagnosis Date Noted Date Diagnosed    Uncontrolled atrial flutter (HCC) 2024     Hyperkalemia 2024     Moderate persistent asthma without complication 2024     Status post cholecystectomy 2024     Acute gallstone pancreatitis 2024     Diabetic neuropathy (HCC) 2023     Eosinophilia 10/18/2023     Anemia due to chronic kidney disease 10/16/2023     Nasal congestion 10/06/2023     Loose stools 2023     Chronic diastolic CHF (HCC) 04/10/2023     Diabetic polyneuropathy associated with type 2 diabetes mellitus (HCC) 2022     CKD (chronic kidney disease) stage 3 (HCC) 2022     Presence of CardioMEMS HF system 2022     Paroxysmal atrial fibrillation (HCC) 2022     Severe persistent asthma 10/11/2021     Hyponatremia 2021     Well adult exam 2021     HNP (herniated nucleus pulposus), lumbar 2021     Foraminal stenosis of lumbar region 2021     VICKY (obstructive sleep apnea)      Hyperlipidemia 2019     Primary osteoarthritis of both knees 2018     Irritable bowel syndrome with diarrhea 2018     Primary hypertension 2018     Type 2 diabetes mellitus with hyperglycemia, without long-term current use of insulin (HCC) 2018     Vitamin B12 deficiency 2016     Vitamin D deficiency 2016     Morbid obesity (HCC) 2016     Primary osteoarthritis of right knee 10/15/2013       LOS (days): 0  Geometric Mean LOS (GMLOS) (days):   Days to GMLOS:     OBJECTIVE:            Current admission status: Observation   Preferred Pharmacy:   [id number] - [place], [place] - [address] [place] [address]  [place]  [address]  Phone: [phone] Fax: [phone]    Primary Care Provider: Soo Chavez MD    Primary Insurance: BLUE CROSS  Secondary Insurance:     DISCHARGE DETAILS:    Discharge planning discussed with:: patient  Freedom of Choice: Yes     CM contacted family/caregiver?: No- see comments  Were Treatment Team discharge recommendations reviewed with patient/caregiver?: Yes  Did patient/caregiver verbalize understanding of patient care needs?: Yes  Were patient/caregiver advised of the risks associated with not following Treatment Team discharge recommendations?: Yes                   Other Referral/Resources/Interventions Provided:  Referral Comments: Discussed therapy recommendations for home PT. Pt declines at this time.

## 2024-06-19 NOTE — ASSESSMENT & PLAN NOTE
Wt Readings from Last 3 Encounters:   05/24/24 (!) 150 kg (331 lb 9.6 oz)   02/20/24 (!) 151 kg (332 lb 12.8 oz)   02/10/24 (!) 157 kg (346 lb 5.5 oz)     Currently appears euvolemic  Daily weights  Continue with bumex 6mg BID  Takes metolazone only with weight gain, will hold for now  Hold potassium and aldactone today due to hyperkalemia

## 2024-06-19 NOTE — H&P
[facility]  H&P  Name: Mesfin Cano 59 y.o. male I MRN: [id number]  Unit/Bed#: CW2 211-02 I Date of Admission: 6/18/2024   Date of Service: 6/19/2024 I Hospital Day: 0      Assessment & Plan   * Uncontrolled atrial flutter (HCC)  Assessment & Plan  Patient presented with 1 week history of shortness of breath and chest pain  On presentation to emergency room he was noted to have atrial flutter with heart rate in the 140s  He is status post electrical cardioversion in the ED with return to normal sinus rhythm  He admits to not taking his metoprolol the last 2 days due to feeling lightheaded/dizzy - suspect due to tachycardia induced hypotension worsened by BB  Place in observation unit  Cardiac telemetry  Follow-up TSH, troponins w/EKG  Echocardiogram   Continue with metoprolol 50 mg daily, first dose now  Continue with Eliquis 5 mg twice daily  Cardiology input in AM       Moderate persistent asthma without complication  Assessment & Plan  Patient with a hx of asthma, had PFT 2021 showing severe reversible obstruction  He is currently using albuterol inh, symbicort and spiriva, he has had to increase albuterol use in the last 1 week  No increase in cough or sputum production, no upper respiratory sxs  Here is is on room air, not using accessory muscle. Lungs sound clear w/o wheezing   Due to dyspnea and suspcion for asthma exacerbation he received solumedrol, duoneb, magnesium   Clinically I do not think this is asthma exacerbation and rather uncontrolled arrythmia  Will hold off further steroids, moreover blood glucose is now elevated  Continue with home bronchodilators symbicort, xopenex in place of albuterol and atrovent  Follow up CXR       Chronic diastolic CHF (HCC)  Assessment & Plan  Wt Readings from Last 3 Encounters:   05/24/24 (!) 150 kg (331 lb 9.6 oz)   02/20/24 (!) 151 kg (332 lb 12.8 oz)   02/10/24 (!) 157 kg (346 lb 5.5 oz)     Currently appears euvolemic  Daily  weights  Continue with bumex 6mg BID  Takes metolazone only with weight gain, will hold for now  Hold potassium and aldactone today due to hyperkalemia            Type 2 diabetes mellitus with hyperglycemia, without long-term current use of insulin ([facility])  Assessment & Plan  Lab Results   Component Value Date    HGBA1C 9.3 (H) 12/28/2023       Recent Labs     06/18/24  2240 06/18/24  2316   POCGLU 390* 461*       Blood Sugar Average: Last 72 hrs:  (P) 425.5    Monitor fingerstick glucose levels  Insulin sliding scale as needed, received 5u of regular insulin tonight  High dose ISS due to elevated blood glucose reasons likely due to steroid use      Hyperkalemia  Assessment & Plan  Mild. 5.4  EKG w/o changes   Received 5u regular insulin and albuterol  Hold potassium supplement and aldactone for today  Repeat potassium level    Diabetic polyneuropathy associated with type 2 diabetes mellitus ([facility])  Assessment & Plan  Lab Results   Component Value Date    HGBA1C 9.3 (H) 12/28/2023       Recent Labs     06/18/24  2240   POCGLU 390*       Blood Sugar Average: Last 72 hrs:  (P) 390  Continue with lyrica nightly     CKD (chronic kidney disease) stage 3 ([facility])  Assessment & Plan  Lab Results   Component Value Date    EGFR 49 06/18/2024    EGFR 48 02/11/2024    EGFR 43 02/10/2024    CREATININE 1.54 (H) 06/18/2024    CREATININE 1.58 (H) 02/11/2024    CREATININE 1.73 (H) 02/10/2024   Renal function at baseline  Monitor daily, avoid nephrotoxins    Anemia due to chronic kidney disease  Assessment & Plan  Hemoglobin currently at baseline  Outpatient follow up, may benefit from replacement iron     Hyponatremia  Assessment & Plan  Suspect worsened due to hyperglycemia  Continue to monitor           VTE Prophylaxis: Apixaban (Eliquis)  / sequential compression device   Code Status: Level 1  POLST: There is no POLST form on file for this patient (pre-hospital)  Discussion with family: No    Anticipated Length of Stay:  Patient will  be admitted on an Observation basis with an anticipated length of stay of  1 - 2 midnights.   Justification for Hospital Stay: as above     Total Time for Visit, including Counseling / Coordination of Care: 45 minutes.  Greater than 50% of this total time spent on direct patient counseling and coordination of care.    Chief Complaint:   Chest pain and shortness of breath    History of Present Illness:    Mesfin Cano is a 59 y.o. male who presents with chest pain and shortness of breath.   He has a PMHx of Asthma, HFpEF, Atrial fibrillation,   Patient was in his usual state of health up until about 1 week ago when he developed shortness of breath and intermittent chest pain.  The chest pain was located in the center of his chest with radiation to his right chest and right jaw.  It was pressure-like in nature and not alleviated by anything or worsened by exertion.  During these episodes which were lasting 5 to 15 minutes at a time he also had shortness of breath.  He denies any increase in his cough or sputum production, no wheezing but did increase his inhaler use in the past 1 week due to shortness of breath with only minimal relief.  During this time he felt dizzy and lightheaded after taking his metoprolol medication which he discontinued taking for the last 2 days.  Today while he was watching TV the chest pain returned and lasted 1 to 1.5 hrs which prompted him to come to the emergency room.  En route to the emergency room he received aspirin and nitroglycerin which did not alleviate his chest pain.    On presentation here he was noted to be tachycardic in the 140s.  EKG notable for atrial flutter.  He promptly underwent cardioversion in the emergency room with return to sinus rhythm.    Due to shortness of breath it was a concern for COPD exacerbation and he received Solu-Medrol, DuoNeb, magnesium and will be admitted for further management.    At the time of evaluation, patient reports chest pain has  resolved since cardioversion,  continues to have mild shortness of breath. He will be admitted for further management    Review of Systems:    Review of Systems   Constitutional:  Negative for activity change, appetite change, chills and fever.   HENT:  Negative for congestion, ear pain, postnasal drip, rhinorrhea, sore throat and trouble swallowing.    Eyes:  Negative for pain and visual disturbance.   Respiratory:  Positive for shortness of breath. Negative for cough, choking, chest tightness and wheezing.    Cardiovascular:  Positive for chest pain and palpitations. Negative for leg swelling.   Gastrointestinal:  Negative for abdominal pain, diarrhea, nausea and vomiting.   Endocrine: Negative for polyuria.   Genitourinary:  Negative for dysuria and hematuria.   Musculoskeletal:  Negative for arthralgias, back pain and joint swelling.   Skin:  Negative for color change and rash.   Neurological:  Positive for dizziness, light-headedness and numbness. Negative for seizures, syncope, weakness and headaches.   Psychiatric/Behavioral:  The patient is not nervous/anxious.    All other systems reviewed and are negative.      Past Medical and Surgical History:     Past Medical History:   Diagnosis Date    Acute gastritis without hemorrhage     last assessed 3/24/17    Asthma     Atrial fibrillation (HCC)     Bilateral leg edema 02/19/2020    CHF (congestive heart failure) (HCC)     Coronary artery disease     Daytime sleepiness 07/17/2019    Diabetes mellitus (HCC)     Dyspnea on exertion 10/11/2021    Edema of both feet 01/23/2020    Gastric bypass status for obesity     Hyperlipidemia     Hypertension     Hypokalemia 11/14/2021    Impaired fasting glucose     last assessed 5/10/17    Knee sprain, bilateral 06/14/2018    Leg cramps 06/16/2022    Obesity     Viral gastroenteritis     last assessed 11/4/16       Past Surgical History:   Procedure Laterality Date    CARDIAC CATHETERIZATION N/A 3/9/2022    Procedure:  CARDIAC RHC W/ PRESSURE SENSOR;  Surgeon: Aminata Wilcox MD;  Location: [facility];  Service: Cardiology    CARDIAC CATHETERIZATION N/A 9/6/2022    Procedure: Cardiac catheterization;  Surgeon: Yolanda Del Rosario DO;  Location: [facility];  Service: Cardiology    CARDIAC CATHETERIZATION N/A 9/6/2022    Procedure: Cardiac Coronary Angiogram;  Surgeon: Yolanda Del Rosario DO;  Location: [facility];  Service: Cardiology    CARDIAC CATHETERIZATION N/A 10/11/2023    Procedure: Cardiac RHC;  Surgeon: Yoel Darden MD;  Location: [facility];  Service: Cardiology    CARPAL TUNNEL RELEASE      bilateral    CHOLECYSTECTOMY LAPAROSCOPIC N/A 2/7/2024    Procedure: CHOLECYSTECTOMY LAPAROSCOPIC;  Surgeon: Nena Ferguson MD;  Location: [facility];  Service: General    GASTRIC BYPASS  2004    with han en y    HERNIA REPAIR      ventral inscisional    IR BIOPSY BONE MARROW  12/14/2023    LAPAROSCOPIC TRANSGASTRIC ACCESS FOR ERCP N/A 2/7/2024    Procedure: LAPAROSCOPIC TRANSGASTRIC ACCESS FOR ERCP;  Surgeon: Nena Ferguson MD;  Location: [facility];  Service: General    LAPAROSCOPIC TRANSGASTRIC ACCESS FOR ERCP N/A 2/7/2024    Procedure: ERCP, sphincterotomy, stone extraction;  Surgeon: Rey Ferguson MD;  Location: [facility];  Service: Gastroenterology    TONSILLECTOMY         Meds/Allergies:    Prior to Admission medications    Medication Sig Start Date End Date Taking? Authorizing Provider   Accu-Chek FastClix Lancets MISC Test blood sugar twice daily 11/16/21   Soo Chavez MD   acetaminophen (TYLENOL) 325 mg tablet Take 2 tablets (650 mg total) by mouth every 6 (six) hours as needed for mild pain, headaches or fever 2/12/24   Paulino Brown MD   albuterol (PROVENTIL HFA,VENTOLIN HFA) 90 mcg/act inhaler Inhale 2 puffs every 4 (four) hours as needed for wheezing or shortness of breath 1/29/24   Soo Chavez MD   apixaban (Eliquis) 5 mg Take 1 tablet (5 mg  total) by mouth 2 (two) times a day 10/24/23   Aminata Wilcox MD   atorvastatin (LIPITOR) 10 mg tablet TAKE 1 TABLET BY MOUTH EVERY DAY 12/18/23   Aminata Wilcox MD   Blood Glucose Monitoring Suppl (Accu-Chek Guide) w/Device KIT Use 2 (two) times a day Test blood sugar twice daily 8/5/21   Soo Chavez MD   budesonide-formoterol (Symbicort) 160-4.5 mcg/act inhaler Inhale 2 puffs 2 (two) times a day Rinse mouth after use. 1/11/24   Pedro Finn DO   bumetanide (BUMEX) 2 mg tablet Take 3 tablets (6 mg total) by mouth 2 (two) times a day 11/22/23 5/24/24  Aster Garcaí PA-C   Empagliflozin (JARDIANCE) 10 MG TABS tablet Take 1 tablet (10 mg total) by mouth daily 5/26/23   Aminata Wilcox MD   EPINEPHrine (EPIPEN) 0.3 mg/0.3 mL SOAJ Inject 0.3 mL (0.3 mg total) into a muscle once for 1 dose 2/21/23 11/30/23  Eric Jaquez MD   fluticasone (FLONASE) 50 mcg/act nasal spray 1 spray into each nostril 2 (two) times a day 10/12/23   RODRIGUE Pickens   Klor-Con M20 20 MEQ tablet TAKE 2 TABLETS BY MOUTH 2 TIMES A DAY. 3/18/24   Soo Chavez MD   loratadine (CLARITIN) 10 mg tablet Take 1 tablet (10 mg total) by mouth daily Do not start before October 13, 2023. 10/13/23   RODRIGUE Pickens   metolazone (ZAROXOLYN) 2.5 mg tablet Take 2 tablets (5 mg total) by mouth if needed (weight gain of 3+ lbs in 24 hours, 5+ lbs in one week or signs of worsening fluid retention) Take 20-30 minutes before Bumex dose and with additional potassium 1/11/24   Aster García PA-C   metoprolol succinate (TOPROL-XL) 50 mg 24 hr tablet Take 1 tablet (50 mg total) by mouth daily 6/9/24   Aminata Wilcox MD   montelukast (SINGULAIR) 10 mg tablet Take 1 tablet (10 mg total) by mouth daily at bedtime 2/3/22 5/24/24  Soo Chavez MD   pregabalin (LYRICA) 50 mg capsule Take 1 caps. at bedtime 1/29/24   Soo Chavez MD   sitaGLIPtin (Januvia) 100 mg tablet TAKE 1/2 TABLET BY MOUTH  EVERY DAY 5/15/24   Soo Chavez MD   spironolactone (ALDACTONE) 25 mg tablet TAKE 1 TABLET (25 MG TOTAL) BY MOUTH DAILY. 5/10/24   Aminata Wilcox MD   tiotropium (Spiriva Respimat) 1.25 MCG/ACT AERS inhaler Inhale 2 puffs daily 4/3/24 5/24/24  Soo Chavez MD   pantoprazole (PROTONIX) 40 mg tablet Take 1 tablet (40 mg total) by mouth daily 3/21/23 6/18/24  RODRIGUE Ivy     I have reviewed home medications with patient personally.    Allergies:   Allergies   Allergen Reactions    Azithromycin Other (See Comments)     Shaky, uneasy feeling     Bactrim [Sulfamethoxazole-Trimethoprim] Other (See Comments)     shakiness       Social History:     Marital Status: /Civil Union  Substance Use History:   Social History     Substance and Sexual Activity   Alcohol Use Yes    Alcohol/week: 2.0 standard drinks of alcohol    Types: 2 Shots of liquor per week    Comment: social     Social History     Tobacco Use   Smoking Status Former    Types: Pipe, Cigars    Start date: 2016    Quit date: 1/1/2021    Years since quitting: 3.4    Passive exposure: Never   Smokeless Tobacco Never   Tobacco Comments    cigar once a day     Social History     Substance and Sexual Activity   Drug Use No       Family History:    non-contributory    Physical Exam:     Vitals:   Blood Pressure: 139/78 (06/19/24 0316)  Pulse: 86 (06/19/24 0316)  Temperature: 97.9 °F (36.6 °C) (06/18/24 1753)  Temp Source: Oral (06/18/24 1753)  Respirations: 18 (06/18/24 2000)  Weight - Scale: (!) 150 kg (329 lb 9.6 oz) (06/18/24 2310)  SpO2: 95 % (06/19/24 0316)    Physical Exam  Constitutional:       General: He is not in acute distress.     Appearance: He is obese. He is not ill-appearing, toxic-appearing or diaphoretic.   HENT:      Nose: No congestion or rhinorrhea.   Eyes:      General: No scleral icterus.  Cardiovascular:      Rate and Rhythm: Normal rate and regular rhythm.      Heart sounds: No murmur heard.  Pulmonary:       Effort: Pulmonary effort is normal. No accessory muscle usage, prolonged expiration or respiratory distress.      Breath sounds: Decreased breath sounds present. No wheezing, rhonchi or rales.   Chest:      Chest wall: No tenderness.   Abdominal:      General: Bowel sounds are normal.      Palpations: Abdomen is soft.      Tenderness: There is no abdominal tenderness. There is no guarding.   Lymphadenopathy:      Cervical: No cervical adenopathy.   Skin:     General: Skin is warm and dry.      Coloration: Skin is not jaundiced.   Neurological:      General: No focal deficit present.      Mental Status: He is alert. Mental status is at baseline.         Additional Data:     Lab Results: I have personally reviewed pertinent reports.      Results from last 7 days   Lab Units 06/19/24  0359 06/18/24  1835   WBC Thousand/uL 8.92 8.45   HEMOGLOBIN g/dL 8.7* 8.9*   HEMATOCRIT % 29.5* 30.7*   PLATELETS Thousands/uL 347 339   SEGS PCT %  --  77*   LYMPHO PCT % 2* 10*   MONO PCT % 1* 8   EOS PCT % 0 3     Results from last 7 days   Lab Units 06/19/24  0338   SODIUM mmol/L 130*   POTASSIUM mmol/L 4.8   CHLORIDE mmol/L 97   CO2 mmol/L 20*   BUN mg/dL 43*   CREATININE mg/dL 1.83*   ANION GAP mmol/L 13   CALCIUM mg/dL 8.6   GLUCOSE RANDOM mg/dL 283*         Results from last 7 days   Lab Units 06/19/24  0515 06/19/24  0053 06/18/24  2316 06/18/24  2240   POC GLUCOSE mg/dl 306* 395* 461* 390*     Results from last 7 days   Lab Units 06/19/24  0313   HEMOGLOBIN A1C % 8.3*           Imaging: I have personally reviewed pertinent reports.      XR chest portable    (Results Pending)       EKG, Pathology, and Other Studies Reviewed on Admission:   EKG: REviewed    [facility] Yes     ** Please Note: This note has been constructed using a voice recognition system. **

## 2024-06-19 NOTE — PROGRESS NOTES
[facility]  Progress Note  Name: Mesfin Cano I  MRN: [id number]  Unit/Bed#: CW2 211-02 I Date of Admission: 6/18/2024   Date of Service: 6/19/2024 I Hospital Day: 0    Assessment & Plan   Moderate persistent asthma without complication  Assessment & Plan  Patient with a hx of asthma, had PFT 2021 showing severe reversible obstruction  He is currently using albuterol inh, symbicort and spiriva, he has had to increase albuterol use in the last 1 week  No increase in cough or sputum production, no upper respiratory sxs  Here is is on room air, not using accessory muscle. Lungs sound clear w/o wheezing   Due to dyspnea and suspcion for asthma exacerbation he received solumedrol, duoneb, magnesium   Clinically I do not think this is asthma exacerbation and rather uncontrolled arrythmia  Will hold off further steroids, moreover blood glucose is now elevated  Continue with home bronchodilators symbicort, xopenex in place of albuterol and atrovent  Follow up CXR       Hyperkalemia  Assessment & Plan  Mild. 5.4  EKG w/o changes   Received 5u regular insulin and albuterol  Hold potassium supplement and aldactone for today  Repeat potassium level    Anemia due to chronic kidney disease  Assessment & Plan  Hemoglobin currently at baseline  Outpatient follow up, may benefit from replacement iron     Chronic diastolic CHF (HCC)  Assessment & Plan  Wt Readings from Last 3 Encounters:   06/19/24 (!) 150 kg (329 lb 9.6 oz)   05/24/24 (!) 150 kg (331 lb 9.6 oz)   02/20/24 (!) 151 kg (332 lb 12.8 oz)     Currently appears euvolemic  Daily weights  Continue with bumex 6mg BID  Takes metolazone only with weight gain, will hold for now  Continue to hold Aldactone            Diabetic polyneuropathy associated with type 2 diabetes mellitus (HCC)  Assessment & Plan  Lab Results   Component Value Date    HGBA1C 8.3 (H) 06/19/2024       Recent Labs     06/19/24  0053 06/19/24  0515 06/19/24  0946  06/19/24  1412   POCGLU 395* 306* 391* 268*         Blood Sugar Average: Last 72 hrs:  (P) 368.5  Continue with lyrica nightly   Start Lantus for hyperglycemia    CKD (chronic kidney disease) stage 3 ([facility])  Assessment & Plan  Lab Results   Component Value Date    EGFR 40 06/19/2024    EGFR 49 06/18/2024    EGFR 48 02/11/2024    CREATININE 1.83 (H) 06/19/2024    CREATININE 1.54 (H) 06/18/2024    CREATININE 1.58 (H) 02/11/2024   Renal function at baseline  Monitor daily, avoid nephrotoxins    Hyponatremia  Assessment & Plan  Suspect worsened due to hyperglycemia  Continue to monitor    Type 2 diabetes mellitus with hyperglycemia, without long-term current use of insulin ([facility])  Assessment & Plan  Lab Results   Component Value Date    HGBA1C 8.3 (H) 06/19/2024       Recent Labs     06/19/24  0053 06/19/24  0515 06/19/24  0946 06/19/24  1412   POCGLU 395* 306* 391* 268*         Blood Sugar Average: Last 72 hrs:  (P) 368.5    Monitor fingerstick glucose levels  Insulin sliding scale as needed, received 5u of regular insulin tonight  High dose ISS due to elevated blood glucose reasons likely due to steroid use      * Uncontrolled atrial flutter (HCC)  Assessment & Plan  Patient presented with 1 week history of shortness of breath and chest pain  On presentation to emergency room he was noted to have atrial flutter with heart rate in the 140s  He is status post electrical cardioversion in the ED with return to normal sinus rhythm  He admits to not taking his metoprolol the last 2 days due to feeling lightheaded/dizzy - suspect due to tachycardia induced hypotension worsened by BB  Place in observation unit  Cardiac telemetry  Follow-up TSH, troponins w/EKG  Echocardiogram   Continue with metoprolol 50 mg daily, first dose now  Continue with Eliquis 5 mg twice daily  Should cardiology input will continue on home beta-blocker and monitor on telemetry for further episodes of atrial flutter, if this occurs we will have  discussion about ablation               VTE Pharmacologic Prophylaxis:   Moderate Risk (Score 3-4) - Pharmacological DVT Prophylaxis Ordered: apixaban (Eliquis).    Mobility:   Basic Mobility Inpatient Raw Score: 22  JH-HLM Goal: 7: Walk 25 feet or more  JH-HLM Achieved: 7: Walk 25 feet or more  JH-HLM Goal achieved. Continue to encourage appropriate mobility.    Patient Centered Rounds: I performed bedside rounds with nursing staff today.   Discussions with Specialists or Other Care Team Provider:     Education and Discussions with Family / Patient: Patient declined call to .     Total Time Spent on Date of Encounter in care of patient:  mins. This time was spent on one or more of the following: performing physical exam; counseling and coordination of care; obtaining or reviewing history; documenting in the medical record; reviewing/ordering tests, medications or procedures; communicating with other healthcare professionals and discussing with patient's family/caregivers.    Current Length of Stay: 0 day(s)  Current Patient Status: Observation   Certification Statement: The patient will continue to require additional inpatient hospital stay due to monitoring on flutter  Discharge Plan: Anticipate discharge in 24-48 hrs to home.    Code Status: Level 1 - Full Code    Subjective:   Denies any acute complaints today    Objective:     Vitals:   Temp (24hrs), Av.3 °F (36.8 °C), Min:97.9 °F (36.6 °C), Max:98.7 °F (37.1 °C)    Temp:  [97.9 °F (36.6 °C)-98.7 °F (37.1 °C)] 98.7 °F (37.1 °C)  HR:  [] 90  Resp:  [18-20] 18  BP: (112-139)/(57-79) 122/72  SpO2:  [94 %-100 %] 96 %  Body mass index is 43.49 kg/m².     Input and Output Summary (last 24 hours):     Intake/Output Summary (Last 24 hours) at 2024 1637  Last data filed at 2024 1354  Gross per 24 hour   Intake 750 ml   Output 2875 ml   Net -2125 ml       Physical Exam:   Physical Exam  Vitals and nursing note reviewed.   Constitutional:        General: He is not in acute distress.     Appearance: He is well-developed. He is not toxic-appearing or diaphoretic.   HENT:      Head: Normocephalic and atraumatic.   Eyes:      General: No scleral icterus.     Conjunctiva/sclera: Conjunctivae normal.   Cardiovascular:      Rate and Rhythm: Normal rate and regular rhythm.      Heart sounds: No murmur heard.     No friction rub. No gallop.   Pulmonary:      Effort: Pulmonary effort is normal. No respiratory distress.      Breath sounds: Normal breath sounds. No stridor. No wheezing, rhonchi or rales.   Chest:      Chest wall: No tenderness.   Abdominal:      General: There is no distension.      Palpations: Abdomen is soft. There is no mass.      Tenderness: There is no abdominal tenderness. There is no guarding or rebound.      Hernia: No hernia is present.   Musculoskeletal:         General: No swelling or tenderness.      Cervical back: Neck supple.   Skin:     General: Skin is warm and dry.      Capillary Refill: Capillary refill takes less than 2 seconds.   Neurological:      Mental Status: He is alert and oriented to person, place, and time.   Psychiatric:         Mood and Affect: Mood normal.          Additional Data:     Labs:  Results from last 7 days   Lab Units 06/19/24  0359 06/18/24  1835   WBC Thousand/uL 8.92 8.45   HEMOGLOBIN g/dL 8.7* 8.9*   HEMATOCRIT % 29.5* 30.7*   PLATELETS Thousands/uL 347 339   SEGS PCT %  --  77*   LYMPHO PCT % 2* 10*   MONO PCT % 1* 8   EOS PCT % 0 3     Results from last 7 days   Lab Units 06/19/24  0338   SODIUM mmol/L 130*   POTASSIUM mmol/L 4.8   CHLORIDE mmol/L 97   CO2 mmol/L 20*   BUN mg/dL 43*   CREATININE mg/dL 1.83*   ANION GAP mmol/L 13   CALCIUM mg/dL 8.6   GLUCOSE RANDOM mg/dL 283*         Results from last 7 days   Lab Units 06/19/24  1412 06/19/24  0946 06/19/24  0515 06/19/24  0053 06/18/24  2316 06/18/24  2240   POC GLUCOSE mg/dl 268* 391* 306* 395* 461* 390*     Results from last 7 days   Lab Units  06/19/24  0313   HEMOGLOBIN A1C % 8.3*           Lines/Drains:  Invasive Devices       Peripheral Intravenous Line  Duration             Peripheral IV 06/18/24 Left Arm 1 day              Drain  Duration             Closed/Suction Drain RUQ Bulb 15 Fr. 132 days                      Telemetry:  Telemetry Orders (From admission, onward)               24 Hour Telemetry Monitoring  Continuous x 24 Hours (Telem)        Question:  Reason for 24 Hour Telemetry  Answer:  PCI/EP study (including pacer and ICD implementation), Cardiac surgery, MI, abnormal cardiac cath, and chest pain- rule out MI                              Imaging: No pertinent imaging reviewed.    Recent Cultures (last 7 days):         Last 24 Hours Medication List:   Current Facility-Administered Medications   Medication Dose Route Frequency Provider Last Rate    acetaminophen  650 mg Oral Q6H PRN Nancy Polo MD      apixaban  5 mg Oral BID Nancy Polo MD      atorvastatin  10 mg Oral Daily Nancy Polo MD      budesonide-formoterol  2 puff Inhalation BID Nancy Polo MD      bumetanide  6 mg Oral BID Roly Nogueira MD      Empagliflozin  10 mg Oral Daily Nancy Polo MD      furosemide  40 mg Intravenous BID (diuretic) Roly Nogueira MD      insulin glargine  20 Units Subcutaneous HS Faheem Banda DO      insulin lispro  1-6 Units Subcutaneous HS Nancy Polo MD      insulin lispro  2-12 Units Subcutaneous 4 times day Nancy Polo MD      levalbuterol  0.63 mg Nebulization Q6H PRN Nancy Polo MD      metoprolol succinate  50 mg Oral Daily Nancy Polo MD      pregabalin  50 mg Oral HS Nancy Polo MD      umeclidinium  1 puff Inhalation Daily Nancy Polo MD          Today, Patient Was Seen By: Faheem Banda DO    **Please Note: This note may have been constructed using a voice recognition system.**

## 2024-06-19 NOTE — ASSESSMENT & PLAN NOTE
Lab Results   Component Value Date    EGFR 49 06/18/2024    EGFR 48 02/11/2024    EGFR 43 02/10/2024    CREATININE 1.54 (H) 06/18/2024    CREATININE 1.58 (H) 02/11/2024    CREATININE 1.73 (H) 02/10/2024   Renal function at baseline  Monitor daily, avoid nephrotoxins

## 2024-06-19 NOTE — RESPIRATORY THERAPY NOTE
RT Protocol Note  Mesfin Cano 59 y.o. male MRN: [id number]  Unit/Bed#: CW2 211-02 Encounter: [id number]    Assessment    Principal Problem:    Uncontrolled atrial flutter (HCC)  Active Problems:    Type 2 diabetes mellitus with hyperglycemia, without long-term current use of insulin (HCC)    CKD (chronic kidney disease) stage 3 (HCC)    Diabetic polyneuropathy associated with type 2 diabetes mellitus (HCC)    Chronic diastolic CHF (HCC)    Anemia due to chronic kidney disease    Hyperkalemia    Moderate persistent asthma without complication      Home Pulmonary Medications:  Albuterol       Past Medical History:   Diagnosis Date    Acute gastritis without hemorrhage     last assessed 3/24/17    Asthma     Atrial fibrillation (HCC)     Bilateral leg edema 02/19/2020    CHF (congestive heart failure) (HCC)     Coronary artery disease     Daytime sleepiness 07/17/2019    Diabetes mellitus (HCC)     Dyspnea on exertion 10/11/2021    Edema of both feet 01/23/2020    Gastric bypass status for obesity     Hyperlipidemia     Hypertension     Hypokalemia 11/14/2021    Impaired fasting glucose     last assessed 5/10/17    Knee sprain, bilateral 06/14/2018    Leg cramps 06/16/2022    Obesity     Viral gastroenteritis     last assessed 11/4/16     Social History     Socioeconomic History    Marital status: /Civil Union     Spouse name: None    Number of children: None    Years of education: None    Highest education level: None   Occupational History    None   Tobacco Use    Smoking status: Former     Types: Pipe, Cigars     Start date: 2016     Quit date: 1/1/2021     Years since quitting: 3.4     Passive exposure: Never    Smokeless tobacco: Never    Tobacco comments:     cigar once a day   Vaping Use    Vaping status: Never Used   Substance and Sexual Activity    Alcohol use: Yes     Alcohol/week: 2.0 standard drinks of alcohol     Types: 2 Shots of liquor per week     Comment: social    Drug use: No    Sexual  activity: Yes     Partners: Female     Birth control/protection: None   Other Topics Concern    None   Social History Narrative    None     Social Determinants of Health     Financial Resource Strain: Not on file   Food Insecurity: No Food Insecurity (2/8/2024)    Hunger Vital Sign     Worried About Running Out of Food in the Last Year: Never true     Ran Out of Food in the Last Year: Never true   Transportation Needs: No Transportation Needs (2/8/2024)    PRAPARE - Transportation     Lack of Transportation (Medical): No     Lack of Transportation (Non-Medical): No   Physical Activity: Not on file   Stress: Not on file   Social Connections: Not on file   Intimate Partner Violence: Not on file   Housing Stability: Low Risk  (2/8/2024)    Housing Stability Vital Sign     Unable to Pay for Housing in the Last Year: No     Number of Times Moved in the Last Year: 1     Homeless in the Last Year: No   Recent Concern: Housing Stability - High Risk (12/27/2023)    Housing Stability Vital Sign     Unable to Pay for Housing in the Last Year: Yes     Number of Places Lived in the Last Year: 1     Unstable Housing in the Last Year: No       Subjective         Objective    Physical Exam:   Assessment Type: Assess only  General Appearance: Sleeping  Respiratory Pattern: Normal  Chest Assessment: Chest expansion symmetrical  Bilateral Breath Sounds: Clear, Diminished    Vitals:  Blood pressure 139/78, pulse 86, temperature 97.9 °F (36.6 °C), temperature source Oral, resp. rate 18, weight (!) 150 kg (329 lb 9.6 oz), SpO2 95%.          Imaging and other studies: I have personally reviewed pertinent reports.            Plan    Respiratory Plan: Home Bronchodilator Patient pathway        Resp Comments: (P) pt assessed per respiratory protocol at this time, pt admitted for uncontrolled atrial flutter, pt with pulmonary history of asthma and takes albuterol prn at home, bs are clear and diminished and spo2 wnl on room air, no work of  breathing is noted and no cxr is visible at this time. will keep pt ordered on 0.63mg Levalbuterol due to heart condition and change to PRN.

## 2024-06-19 NOTE — ASSESSMENT & PLAN NOTE
Lab Results   Component Value Date    HGBA1C 8.3 (H) 06/19/2024       Recent Labs     06/19/24  0053 06/19/24  0515 06/19/24  0946 06/19/24  1412   POCGLU 395* 306* 391* 268*         Blood Sugar Average: Last 72 hrs:  (P) 368.5  Continue with lyrica nightly   Start Lantus for hyperglycemia

## 2024-06-19 NOTE — PHYSICAL THERAPY NOTE
Physical Therapy Evaluation    Patient Name: Mesfin Cano    Today's Date: 6/19/2024     Problem List  Principal Problem:    Uncontrolled atrial flutter (HCC)  Active Problems:    Type 2 diabetes mellitus with hyperglycemia, without long-term current use of insulin (HCC)    Hyponatremia    CKD (chronic kidney disease) stage 3 (HCC)    Diabetic polyneuropathy associated with type 2 diabetes mellitus (HCC)    Chronic diastolic CHF (HCC)    Anemia due to chronic kidney disease    Hyperkalemia    Moderate persistent asthma without complication       Past Medical History  Past Medical History:   Diagnosis Date    Acute gastritis without hemorrhage     last assessed 3/24/17    Asthma     Atrial fibrillation (HCC)     Bilateral leg edema 02/19/2020    CHF (congestive heart failure) (HCC)     Coronary artery disease     Daytime sleepiness 07/17/2019    Diabetes mellitus (HCC)     Dyspnea on exertion 10/11/2021    Edema of both feet 01/23/2020    Gastric bypass status for obesity     Hyperlipidemia     Hypertension     Hypokalemia 11/14/2021    Impaired fasting glucose     last assessed 5/10/17    Knee sprain, bilateral 06/14/2018    Leg cramps 06/16/2022    Obesity     Viral gastroenteritis     last assessed 11/4/16        Past Surgical History  Past Surgical History:   Procedure Laterality Date    CARDIAC CATHETERIZATION N/A 3/9/2022    Procedure: CARDIAC RHC W/ PRESSURE SENSOR;  Surgeon: Aminata Wilcox MD;  Location: [facility];  Service: Cardiology    CARDIAC CATHETERIZATION N/A 9/6/2022    Procedure: Cardiac catheterization;  Surgeon: Yolanda Del Rosario DO;  Location: [facility];  Service: Cardiology    CARDIAC CATHETERIZATION N/A 9/6/2022    Procedure: Cardiac Coronary Angiogram;  Surgeon: Yolanda Del Rosario DO;  [facility]: [facility];  Service: Cardiology    CARDIAC CATHETERIZATION N/A 10/11/2023    Procedure: Cardiac RHC;  Surgeon:  Yoel Darden MD;  Location: [facility];  Service: Cardiology    CARPAL TUNNEL RELEASE      bilateral    CHOLECYSTECTOMY LAPAROSCOPIC N/A 2/7/2024    Procedure: CHOLECYSTECTOMY LAPAROSCOPIC;  Surgeon: Nena Ferguson MD;  Location: [facility];  Service: General    GASTRIC BYPASS  2004    with han en y    HERNIA REPAIR      ventral inscisional    IR BIOPSY BONE MARROW  12/14/2023    LAPAROSCOPIC TRANSGASTRIC ACCESS FOR ERCP N/A 2/7/2024    Procedure: LAPAROSCOPIC TRANSGASTRIC ACCESS FOR ERCP;  Surgeon: Nena Ferguson MD;  Location: [facility];  Service: General    LAPAROSCOPIC TRANSGASTRIC ACCESS FOR ERCP N/A 2/7/2024    Procedure: ERCP, sphincterotomy, stone extraction;  Surgeon: Rey Ferguson MD;  Location: [facility];  Service: Gastroenterology    TONSILLECTOMY          06/19/24 0819   PT Last Visit   PT Visit Date 06/19/24   Note Type   Note type Evaluation   Pain Assessment   Pain Assessment Tool 0-10   Pain Score No Pain   Hospital Pain Intervention(s) Repositioned;Ambulation/increased activity   Restrictions/Precautions   Weight Bearing Precautions Per Order No   Other Precautions Multiple lines;Fall Risk   Home Living   Type of Home House   Home Layout Multi-level;Stairs to enter with rails;Bed/bath upstairs  (6 MADYSON)   Bathroom Shower/Tub Tub/shower unit   Bathroom Toilet Raised   Bathroom Equipment Other (Comment)  (denies DME)   Bathroom Accessibility Accessible   Home Equipment Cane   Additional Comments pt reports living in 3SH w/ 6 MADYSON and FFOS to access 2nd floor bedroom/bathroom. Pt was using SPC prn in community.   Prior Function   Level of [place] Independent with ADLs;Independent with functional mobility;Independent with IADLS;Needs assistance with IADLS   Lives With Spouse;Daughter   Receives Help From Family   IADLs Independent with driving;Independent with meal prep;Independent with medication management   Falls in the last 6 months 1 to 4  (3 falls per pr report)    Vocational Retired  (pt reports trying to get on disability)   Comments (+) driving   General   Family/Caregiver Present No   Cognition   Overall Cognitive Status WFL   Arousal/Participation Alert   Attention Within functional limits   Orientation Level Oriented X4   Memory Within functional limits   Following Commands Follows all commands and directions without difficulty   Comments pt pleasant and cooperative   RLE Assessment   RLE Assessment WFL   LLE Assessment   LLE Assessment WFL   Bed Mobility   Supine to Sit Unable to assess   Sit to Supine Unable to assess   Additional Comments Pt seated EOB upon arrival. pt returned to EOB with call bell and all needs following PT session   Transfers   Sit to Stand 5  Supervision   Additional items Increased time required   Stand to Sit 5  Supervision   Additional items Increased time required   Additional Comments w/o AD   Ambulation/Elevation   Gait pattern Improper Weight shift;Wide ALLYSON;Decreased foot clearance;Shuffling;Short stride;Excessively slow   Gait Assistance 5  Supervision   Additional items Assist x 1;Verbal cues   Assistive Device None   Distance 50'  (x2 standing rest breaks)   Ambulation/Elevation Additional Comments pt required x2 standing rest breaks during short distance ambulation 2/2 SOB   Balance   Static Sitting Good   Dynamic Sitting Fair +   Static Standing Fair   Dynamic Standing Fair -   Ambulatory Fair -   Endurance Deficit   Endurance Deficit Yes   Endurance Deficit Description fatigue, deconditioning, LE weakness   Activity Tolerance   Activity Tolerance Patient limited by fatigue;Patient tolerated treatment well   Medical Staff Made Aware OT Rosina   Nurse Made Aware RN cleared   Assessment   Prognosis Good   Problem List Decreased strength;Decreased endurance;Impaired balance;Decreased mobility   Assessment Pt is a 59 y.o. male seen for PT evaluation s/p admit to [facility] on 6/18/2024. Pt was admitted with a primary dx of:  uncontrolled A flutter.  PT now consulted for assessment of mobility and d/c needs. Pt with Up and OOB as tolerated  orders.  Pts current comorbidities effecting treatment include: type 2 DM, polyneuropathy, anemia, asthma and CKD. Pts current clinical presentation is Unstable/ Unpredictable (high complexity) due to Ongoing medical management for primary dx, Decreased activity tolerance compared to baseline, Fall risk, Increased assistance needed from caregiver at current time, Continuous pulse oximetry monitoring . Prior to admission, pt was living in H and was IND in mobility. Upon evaluation, pt currently is requiring SUP for transfers; SUP for ambulation 40 ft w/ no AD. Pt presents at PT eval functioning below baseline and currently w/ overall mobility deficits 2* to: BLE weakness, impaired balance, decreased endurance, gait deviations, decreased activity tolerance compared to baseline, decreased functional mobility tolerance compared to baseline, fall risk, SOB upon exertion. Pt currently at a fall risk 2* to impairments listed above.  Pt will continue to benefit from skilled acute PT interventions to address stated impairments; to maximize functional mobility; for ongoing pt/ family training; and DME needs. At conclusion of PT session pt returned BTB and all needs in reach with phone and call bell within reach. Pt denies any further questions at this time. The patient's AM-PAC Basic Mobility Inpatient Short Form Raw Score is 22. A Raw score of greater than 16 suggests the patient may benefit from discharge to home. Please also refer to the recommendation of the Physical Therapist for safe discharge planning. Recommend home w/ HHPT upon hospital D/C.   Barriers to Discharge Inaccessible home environment   Goals   Patient Goals to go home   STG Expiration Date 07/03/24   Short Term Goal #1 STG 1. Pt will be able to perform bed mobility tasks with mod I in order to improve overall functional mobility and assist  in safe d/c. STG 3. Pt will be able to perform functional transfer with mod I in order to improve overall functional mobility and assist in safe d/c. STG 4. Pt will be able to ambulate at least 100 feet with least restrictive device with mod I A in order to improve overall functional mobility and assist in safe d/c. STG 5. Pt will improve sitting/standing static/dynamic balance 1/2 grade in order to improve functional mobility and assist in safe d/c. STG 6. Pt will improve LE strength by 1/2 grade in order to improve functional mobility and assist in safe d/c. STG 7. Pt will be able to negotiate at least 6 stairs with least restrictive device with mod I A in order to improve overall functional mobility and assist in safe d/c.   PT Treatment Day 0   Plan   PT Frequency 2-3x/wk   Discharge Recommendation   Rehab Resource Intensity Level, PT III (Minimum Resource Intensity)   AM-PAC Basic Mobility Inpatient   Turning in Flat Bed Without Bedrails 4   Lying on Back to Sitting on Edge of Flat Bed Without Bedrails 4   Moving Bed to Chair 4   Standing Up From Chair Using Arms 4   Walk in Room 3   Climb 3-5 Stairs With Railing 3   Basic Mobility Inpatient Raw Score 22   Basic Mobility Standardized Score 47.4   [facility] Highest Level Of Mobility   -HLM Goal 7: Walk 25 feet or more   -HLM Achieved 7: Walk 25 feet or more   Modified Blunt Scale   Modified Blunt Scale 3   End of Consult   Patient Position at End of Consult Supine;Bed/Chair alarm activated;All needs within reach     Sharon Guerin PT, DPT

## 2024-06-20 ENCOUNTER — APPOINTMENT (OUTPATIENT)
Dept: NON INVASIVE DIAGNOSTICS | Facility: HOSPITAL | Age: 59
DRG: 309 | End: 2024-06-20
Payer: COMMERCIAL

## 2024-06-20 LAB
2HR DELTA HS TROPONIN: -1 NG/L
4HR DELTA HS TROPONIN: 0 NG/L
ANION GAP SERPL CALCULATED.3IONS-SCNC: 11 MMOL/L (ref 4–13)
AORTIC ROOT: 3.6 CM
APICAL FOUR CHAMBER EJECTION FRACTION: 51 %
ASCENDING AORTA: 3.5 CM
ATRIAL RATE: 81 BPM
BSA FOR ECHO PROCEDURE: 2.66 M2
BUN SERPL-MCNC: 63 MG/DL (ref 5–25)
CALCIUM SERPL-MCNC: 7.8 MG/DL (ref 8.4–10.2)
CARDIAC TROPONIN I PNL SERPL HS: 55 NG/L
CARDIAC TROPONIN I PNL SERPL HS: 56 NG/L
CARDIAC TROPONIN I PNL SERPL HS: 56 NG/L
CHLORIDE SERPL-SCNC: 99 MMOL/L (ref 96–108)
CO2 SERPL-SCNC: 24 MMOL/L (ref 21–32)
CREAT SERPL-MCNC: 2.21 MG/DL (ref 0.6–1.3)
FRACTIONAL SHORTENING: 21 (ref 28–44)
GFR SERPL CREATININE-BSD FRML MDRD: 31 ML/MIN/1.73SQ M
GLUCOSE SERPL-MCNC: 113 MG/DL (ref 65–140)
GLUCOSE SERPL-MCNC: 153 MG/DL (ref 65–140)
GLUCOSE SERPL-MCNC: 162 MG/DL (ref 65–140)
GLUCOSE SERPL-MCNC: 172 MG/DL (ref 65–140)
GLUCOSE SERPL-MCNC: 187 MG/DL (ref 65–140)
GLUCOSE SERPL-MCNC: 277 MG/DL (ref 65–140)
INTERVENTRICULAR SEPTUM IN DIASTOLE (PARASTERNAL SHORT AXIS VIEW): 1.1 CM
INTERVENTRICULAR SEPTUM: 1.1 CM (ref 0.6–1.1)
LAAS-AP2: 31.6 CM2
LAAS-AP4: 26.5 CM2
LEFT ATRIUM SIZE: 4.7 CM
LEFT ATRIUM VOLUME (MOD BIPLANE): 105 ML
LEFT ATRIUM VOLUME INDEX (MOD BIPLANE): 43 ML/M2
LEFT INTERNAL DIMENSION IN SYSTOLE: 4.8 CM (ref 2.1–4)
LEFT VENTRICULAR INTERNAL DIMENSION IN DIASTOLE: 6.1 CM (ref 3.5–6)
LEFT VENTRICULAR POSTERIOR WALL IN END DIASTOLE: 1.1 CM
LEFT VENTRICULAR STROKE VOLUME: 75 ML
LVSV (TEICH): 75 ML
MAGNESIUM SERPL-MCNC: 2.5 MG/DL (ref 1.9–2.7)
P AXIS: 38 DEGREES
POTASSIUM SERPL-SCNC: 3.5 MMOL/L (ref 3.5–5.3)
PR INTERVAL: 154 MS
QRS AXIS: 36 DEGREES
QRSD INTERVAL: 86 MS
QT INTERVAL: 428 MS
QTC INTERVAL: 497 MS
RIGHT ATRIAL 2D VOLUME: 77 ML
RIGHT ATRIUM AREA SYSTOLE A4C: 23.6 CM2
RIGHT VENTRICLE ID DIMENSION: 5 CM
SL CV LEFT ATRIUM LENGTH A2C: 6.2 CM
SL CV LV EF: 55
SL CV PED ECHO LEFT VENTRICLE DIASTOLIC VOLUME (MOD BIPLANE) 2D: 184 ML
SL CV PED ECHO LEFT VENTRICLE SYSTOLIC VOLUME (MOD BIPLANE) 2D: 109 ML
SODIUM SERPL-SCNC: 134 MMOL/L (ref 135–147)
T WAVE AXIS: 90 DEGREES
TRICUSPID ANNULAR PLANE SYSTOLIC EXCURSION: 2.1 CM
VENTRICULAR RATE: 81 BPM

## 2024-06-20 PROCEDURE — 99232 SBSQ HOSP IP/OBS MODERATE 35: CPT | Performed by: INTERNAL MEDICINE

## 2024-06-20 PROCEDURE — 93306 TTE W/DOPPLER COMPLETE: CPT

## 2024-06-20 PROCEDURE — 84484 ASSAY OF TROPONIN QUANT: CPT

## 2024-06-20 PROCEDURE — 82948 REAGENT STRIP/BLOOD GLUCOSE: CPT

## 2024-06-20 PROCEDURE — 80048 BASIC METABOLIC PNL TOTAL CA: CPT | Performed by: INTERNAL MEDICINE

## 2024-06-20 PROCEDURE — 83735 ASSAY OF MAGNESIUM: CPT | Performed by: INTERNAL MEDICINE

## 2024-06-20 PROCEDURE — 84484 ASSAY OF TROPONIN QUANT: CPT | Performed by: INTERNAL MEDICINE

## 2024-06-20 PROCEDURE — 93010 ELECTROCARDIOGRAM REPORT: CPT | Performed by: INTERNAL MEDICINE

## 2024-06-20 PROCEDURE — 93306 TTE W/DOPPLER COMPLETE: CPT | Performed by: INTERNAL MEDICINE

## 2024-06-20 RX ORDER — INSULIN LISPRO 100 [IU]/ML
5 INJECTION, SOLUTION INTRAVENOUS; SUBCUTANEOUS
Status: DISCONTINUED | OUTPATIENT
Start: 2024-06-20 | End: 2024-06-26 | Stop reason: HOSPADM

## 2024-06-20 RX ADMIN — INSULIN LISPRO 5 UNITS: 100 INJECTION, SOLUTION INTRAVENOUS; SUBCUTANEOUS at 14:22

## 2024-06-20 RX ADMIN — INSULIN LISPRO 1 UNITS: 100 INJECTION, SOLUTION INTRAVENOUS; SUBCUTANEOUS at 21:43

## 2024-06-20 RX ADMIN — PREGABALIN 50 MG: 50 CAPSULE ORAL at 21:41

## 2024-06-20 RX ADMIN — APIXABAN 5 MG: 5 TABLET, FILM COATED ORAL at 08:49

## 2024-06-20 RX ADMIN — BUDESONIDE AND FORMOTEROL FUMARATE DIHYDRATE 2 PUFF: 160; 4.5 AEROSOL RESPIRATORY (INHALATION) at 08:49

## 2024-06-20 RX ADMIN — UMECLIDINIUM 1 PUFF: 62.5 AEROSOL, POWDER ORAL at 08:49

## 2024-06-20 RX ADMIN — ATORVASTATIN CALCIUM 10 MG: 10 TABLET, FILM COATED ORAL at 08:49

## 2024-06-20 RX ADMIN — INSULIN LISPRO 6 UNITS: 100 INJECTION, SOLUTION INTRAVENOUS; SUBCUTANEOUS at 08:56

## 2024-06-20 RX ADMIN — INSULIN GLARGINE 20 UNITS: 100 INJECTION, SOLUTION SUBCUTANEOUS at 21:41

## 2024-06-20 RX ADMIN — BUDESONIDE AND FORMOTEROL FUMARATE DIHYDRATE 2 PUFF: 160; 4.5 AEROSOL RESPIRATORY (INHALATION) at 21:43

## 2024-06-20 RX ADMIN — EMPAGLIFLOZIN 10 MG: 10 TABLET, FILM COATED ORAL at 08:50

## 2024-06-20 RX ADMIN — INSULIN LISPRO 2 UNITS: 100 INJECTION, SOLUTION INTRAVENOUS; SUBCUTANEOUS at 14:22

## 2024-06-20 RX ADMIN — APIXABAN 5 MG: 5 TABLET, FILM COATED ORAL at 21:41

## 2024-06-20 RX ADMIN — INSULIN LISPRO 2 UNITS: 100 INJECTION, SOLUTION INTRAVENOUS; SUBCUTANEOUS at 06:11

## 2024-06-20 NOTE — ASSESSMENT & PLAN NOTE
Patient presented with 1 week history of shortness of breath and chest pain  On presentation to emergency room he was noted to have atrial flutter with heart rate in the 140s  He is status post electrical cardioversion in the ED with return to normal sinus rhythm  He admits to not taking his metoprolol the last 2 days due to feeling lightheaded/dizzy - suspect due to tachycardia induced hypotension worsened by BB  Place in observation unit  Cardiac telemetry  Follow-up TSH, troponins w/EKG  Echocardiogram   Continue with metoprolol 50 mg daily, first dose now  Continue with Eliquis 5 mg twice daily  Appreaciate cardiology input will continue on home beta-blocker and monitor on telemetry for further episodes of atrial flutter, if this occurs we will have discussion about ablation

## 2024-06-20 NOTE — PROGRESS NOTES
[facility]  Progress Note  Name: Mesfin Cano I  MRN: [id number]  Unit/Bed#: CW2 211-02 I Date of Admission: 6/18/2024   Date of Service: 6/20/2024 I Hospital Day: 0    Assessment & Plan   Moderate persistent asthma without complication  Assessment & Plan  Patient with a hx of asthma, had PFT 2021 showing severe reversible obstruction  He is currently using albuterol inh, symbicort and spiriva, he has had to increase albuterol use in the last 1 week  No increase in cough or sputum production, no upper respiratory sxs  Here is is on room air, not using accessory muscle. Lungs sound clear w/o wheezing       Hyperkalemia  Assessment & Plan  Resolved     Anemia due to chronic kidney disease  Assessment & Plan  Hemoglobin currently at baseline  Outpatient follow up, may benefit from replacement iron     Chronic diastolic CHF (HCC)  Assessment & Plan  Wt Readings from Last 3 Encounters:   06/20/24 (!) 149 kg (329 lb)   05/24/24 (!) 150 kg (331 lb 9.6 oz)   02/20/24 (!) 151 kg (332 lb 12.8 oz)     Currently appears euvolemic  Daily weights  Holding diuretics due to significant increase in creatinine over the past 24 hours  Continue to hold Aldactone            Diabetic polyneuropathy associated with type 2 diabetes mellitus (HCC)  Assessment & Plan  Lab Results   Component Value Date    HGBA1C 8.3 (H) 06/19/2024       Recent Labs     06/19/24  1851 06/19/24  2120 06/20/24  0610 06/20/24  0853   POCGLU 306* 216* 162* 277*         Blood Sugar Average: Last 72 hrs:  (P) 309.4755315415738188  Continue with lyrica nightly   Start Lantus for hyperglycemia    CKD (chronic kidney disease) stage 3 (HCC)  Assessment & Plan  Lab Results   Component Value Date    EGFR 31 06/20/2024    EGFR 40 06/19/2024    EGFR 49 06/18/2024    CREATININE 2.21 (H) 06/20/2024    CREATININE 1.83 (H) 06/19/2024    CREATININE 1.54 (H) 06/18/2024   Creatinine elevated today we will hold further  diuretics    Hyponatremia  Assessment & Plan  Sodium levels improved  Continue to monitor    Type 2 diabetes mellitus with hyperglycemia, without long-term current use of insulin (HCC)  Assessment & Plan  Lab Results   Component Value Date    HGBA1C 8.3 (H) 06/19/2024       Recent Labs     06/19/24  1851 06/19/24  2120 06/20/24  0610 06/20/24  0853   POCGLU 306* 216* 162* 277*         Blood Sugar Average: Last 72 hrs:  (P) 309.4817519534439441    Monitor fingerstick glucose levels  Insulin sliding scale as needed, received 5u of regular insulin tonight  High dose ISS due to elevated blood glucose reasons likely due to steroid use      * Uncontrolled atrial flutter (HCC)  Assessment & Plan  Patient presented with 1 week history of shortness of breath and chest pain  On presentation to emergency room he was noted to have atrial flutter with heart rate in the 140s  He is status post electrical cardioversion in the ED with return to normal sinus rhythm  He admits to not taking his metoprolol the last 2 days due to feeling lightheaded/dizzy - suspect due to tachycardia induced hypotension worsened by BB  Place in observation unit  Cardiac telemetry  Follow-up TSH, troponins w/EKG  Echocardiogram   Continue with metoprolol 50 mg daily, first dose now  Continue with Eliquis 5 mg twice daily  Appreaciate cardiology input will continue on home beta-blocker and monitor on telemetry for further episodes of atrial flutter, if this occurs we will have discussion about ablation                 VTE Pharmacologic Prophylaxis:   Moderate Risk (Score 3-4) - Pharmacological DVT Prophylaxis Ordered: apixaban (Eliquis).    Mobility:   Basic Mobility Inpatient Raw Score: 22  JH-HLM Goal: 7: Walk 25 feet or more  JH-HLM Achieved: 7: Walk 25 feet or more  JH-HLM Goal achieved. Continue to encourage appropriate mobility.    Patient Centered Rounds: I performed bedside rounds with nursing staff today.   Discussions with Specialists or Other  Care Team Provider:     Education and Discussions with Family / Patient: Patient declined call to .     Total Time Spent on Date of Encounter in care of patient:  mins. This time was spent on one or more of the following: performing physical exam; counseling and coordination of care; obtaining or reviewing history; documenting in the medical record; reviewing/ordering tests, medications or procedures; communicating with other healthcare professionals and discussing with patient's family/caregivers.    Current Length of Stay: 0 day(s)  Current Patient Status: Inpatient   Certification Statement: The patient will continue to require additional inpatient hospital stay due to    Discharge Plan: Anticipate discharge in 48 hrs to discharge location to be determined pending rehab evaluations.    Code Status: Level 1 - Full Code    Subjective:   Patient denies any acute complaints this morning however overnight he did have episode of chest discomfort described as a pressure radiating to the right side of his chest, denied any diaphoresis nausea vomiting lightheadedness dizziness palpitation    Objective:     Vitals:   Temp (24hrs), Av °F (36.7 °C), Min:97.6 °F (36.4 °C), Max:98.9 °F (37.2 °C)    Temp:  [97.6 °F (36.4 °C)-98.9 °F (37.2 °C)] 97.6 °F (36.4 °C)  HR:  [72-85] 74  BP: ()/(56-75) 104/64  SpO2:  [95 %-96 %] 95 %  Body mass index is 43.41 kg/m².     Input and Output Summary (last 24 hours):     Intake/Output Summary (Last 24 hours) at 2024 1300  Last data filed at 2024 0441  Gross per 24 hour   Intake 480 ml   Output 3925 ml   Net -3445 ml       Physical Exam:   Physical Exam  Vitals and nursing note reviewed.   Constitutional:       General: He is not in acute distress.     Appearance: He is well-developed. He is not toxic-appearing or diaphoretic.   HENT:      Head: Normocephalic and atraumatic.   Eyes:      General: No scleral icterus.     Conjunctiva/sclera: Conjunctivae normal.    Cardiovascular:      Rate and Rhythm: Normal rate. Rhythm irregular.      Heart sounds: No murmur heard.     No friction rub. No gallop.   Pulmonary:      Effort: Pulmonary effort is normal. No respiratory distress.      Breath sounds: Normal breath sounds. No stridor. No wheezing, rhonchi or rales.   Chest:      Chest wall: No tenderness.   Abdominal:      General: There is no distension.      Palpations: Abdomen is soft. There is no mass.      Tenderness: There is no abdominal tenderness. There is no guarding or rebound.      Hernia: No hernia is present.   Musculoskeletal:         General: No swelling.      Cervical back: Neck supple.      Right lower leg: Edema present.      Left lower leg: Edema present.   Skin:     General: Skin is warm and dry.      Capillary Refill: Capillary refill takes less than 2 seconds.   Neurological:      Mental Status: He is alert and oriented to person, place, and time.   Psychiatric:         Mood and Affect: Mood normal.          Additional Data:     Labs:  Results from last 7 days   Lab Units 06/19/24  0359 06/18/24  1835   WBC Thousand/uL 8.92 8.45   HEMOGLOBIN g/dL 8.7* 8.9*   HEMATOCRIT % 29.5* 30.7*   PLATELETS Thousands/uL 347 339   SEGS PCT %  --  77*   LYMPHO PCT % 2* 10*   MONO PCT % 1* 8   EOS PCT % 0 3     Results from last 7 days   Lab Units 06/20/24  0436   SODIUM mmol/L 134*   POTASSIUM mmol/L 3.5   CHLORIDE mmol/L 99   CO2 mmol/L 24   BUN mg/dL 63*   CREATININE mg/dL 2.21*   ANION GAP mmol/L 11   CALCIUM mg/dL 7.8*   GLUCOSE RANDOM mg/dL 172*         Results from last 7 days   Lab Units 06/20/24  0853 06/20/24  0610 06/19/24  2120 06/19/24  1851 06/19/24  1655 06/19/24  1412 06/19/24  0946 06/19/24  0515 06/19/24  0053 06/18/24  2316 06/18/24  2240   POC GLUCOSE mg/dl 277* 162* 216* 306* 228* 268* 391* 306* 395* 461* 390*     Results from last 7 days   Lab Units 06/19/24  0313   HEMOGLOBIN A1C % 8.3*           Lines/Drains:  Invasive Devices       Peripheral  Intravenous Line  Duration             Peripheral IV 06/19/24 Left;Proximal;Ventral (anterior) Forearm <1 day              Drain  Duration             Closed/Suction Drain RUQ Bulb 15 Fr. 133 days                      Telemetry:  Telemetry Orders (From admission, onward)               24 Hour Telemetry Monitoring  Continuous x 24 Hours (Telem)        Question:  Reason for 24 Hour Telemetry  Answer:  PCI/EP study (including pacer and ICD implementation), Cardiac surgery, MI, abnormal cardiac cath, and chest pain- rule out MI                                Imaging: No pertinent imaging reviewed.    Recent Cultures (last 7 days):         Last 24 Hours Medication List:   Current Facility-Administered Medications   Medication Dose Route Frequency Provider Last Rate    acetaminophen  650 mg Oral Q6H PRN Nancy Polo MD      apixaban  5 mg Oral BID Nancy Polo MD      atorvastatin  10 mg Oral Daily Nancy Polo MD      budesonide-formoterol  2 puff Inhalation BID Nancy Polo MD      Empagliflozin  10 mg Oral Daily Nancy Polo MD      insulin glargine  20 Units Subcutaneous HS Faheem Banda DO      insulin lispro  1-6 Units Subcutaneous HS Nancy Polo MD      insulin lispro  2-12 Units Subcutaneous 4 times day Nancy Polo MD      insulin lispro  5 Units Subcutaneous TID With Meals Faheem Banda DO      levalbuterol  0.63 mg Nebulization Q6H PRN Nancy Polo MD      metoprolol succinate  50 mg Oral Daily Nancy Polo MD      pregabalin  50 mg Oral HS Nancy Polo MD      umeclidinium  1 puff Inhalation Daily Nancy Polo MD          Today, Patient Was Seen By: Faheem Banda DO    **Please Note: This note may have been constructed using a voice recognition system.**

## 2024-06-20 NOTE — PROGRESS NOTES
Cardiology Team 2 Progress Note - Mesfin Cano 59 y.o. male MRN: [id number]    Unit/Bed#: CW2 211-02 Encounter: [id number]        Assessment:  Principal Problem:    Uncontrolled atrial flutter (HCC)  Active Problems:    Type 2 diabetes mellitus with hyperglycemia, without long-term current use of insulin (HCC)    Hyponatremia    CKD (chronic kidney disease) stage 3 (HCC)    Diabetic polyneuropathy associated with type 2 diabetes mellitus (HCC)    Chronic diastolic CHF (HCC)    Anemia due to chronic kidney disease    Hyperkalemia    Moderate persistent asthma without complication    Assessment:  Patient is a 59-year-old M with PMH of HFpEF, A-fib on Eliquis, HTN, T2DM, CKD, asthma admitted for 1 week history of CP and SOB. Found with symptomatic atrial flutter s/p electrical cardioversion upon arrival to ED with conversion to NSR. Metoprolol, home bumex restarted, and given 1 x lasix 40 IV yesterday with good UOP, but now dry and with soft BP. Had similar CP overnight with flat troponin, and ECG revealing irregularly regular rhythm. Pending ECHO read.      ##Atrial flutter s/p cardioversion to NSR  ##Atrial fibrillation  Patient has a history of rate controlled atrial fibrillation with metoprolol and Eliquis anticoagulation.  However, unsure when his last event of atrial fibrillation was.  Looking at prior ECG strips, they appear for the most part in sinus rhythm.  Patient recently held his metoprolol due to symptoms of dizziness/ lightheadedness. Upon restarted BB and diuretics, patient's BP soft and kidney function increased. Most likely over-corrected volume status.     ##Chest pain and SOB    Another episode of CP overnight 5/10 pain, same characteristic pain as presenting CP. Troponin remains around 58-60, but without delta. No new events of aflutter on telemetry but does have notable irregular rhythm. This rhythm spontaneously returned to regular NSR, but reconverted back to irregular rhythm around 0700 this  morning.  Prior LHC negative for CAD. ECHO currently pending. No ST changes on ECG. Unresponsive to NTG. No reproducible chest wall tenderness. CXR unremarkable. Thus, likely transient demand ischemia from atrial flutter. Had repeat episode of CP without aflutter on tele, but does have bigeminy with PACs.     ##Electrolyte imbalance (Corrected)  Pseudohyponatremia in setting of hyperglycemia. Corrected is 133-134 which is close to patient's baseline.  K on presentation 5.4. Now wnl after albuterol and insulin. Spironolactone being held for this reason.   ##Hypertension  Currently softer pressures after lasix 40mg and resuming bumex 6mg BID.  Metoprolol not meeting parameter.  Previously prescribed Entresto but unable to afford the medication.  ## T2DM  Hyperglycemic.  Last A1c 12/2023 9.3.  Does not take insulin at home.  Jardiance 10 mg, Januvia 50 mg daily  ##HFpEF  HFpEF most likely from arrhythmia, HTN, obesity.  Last TTE on 4/2023 EF 50% G2 DD.  LHC on 9/2022 with no obstructive CAD. CardioMEMS Pad (which correlates to RHC) reading 15 on 5/22. History of admissions from acute heart failure.  Patient appears euvolemic.  Near his dry weight.  Takes Bumex 6 mg twice daily with metolazone 5 mg as needed.  ##Asthma/COPD  Cw primary management.         Plan:  - Continue on telemetry   - Metoprolol held today due to parameters  - Cw eliquis  - Plan to hold bumex this AM in setting of mild ANTONIO and soft BP  - STAT troponin/ECG in the event of cardiac symptoms  - Will discuss with team about possible consideration for repeat cath based on ECHO, which is still pending  - Continue with control over hyperglycemia        Subjective:   Patient seen and examined. States he had a similar 5/10 chest pressure in retrosternal area radiating to R chest around 10-11PM last night. This time, did not radiate to jaw. This episode lasted 10-15 minutes. Denies palpitations. Once this resolved, no further events. No associated  diaphoresis, nausea, vomiting, or abdominal pain. Endorsing polyuria from all the diuretics. Otherwise did not sleep well due to health concerns. No fever, chills, visual changes, lightheadedness, dizziness. No increase in orthopnea. Mild improvement in extremity swelling.       Vitals: Blood pressure 91/56, pulse 74, temperature 97.6 °F (36.4 °C), resp. rate 18, weight (!) 149 kg (329 lb 9.4 oz), SpO2 95%., Body mass index is 43.48 kg/m².,   Orthostatic Blood Pressures      Flowsheet Row Most Recent Value   Blood Pressure 91/56 filed at 06/20/2024 0440   Patient Position - Orthostatic VS Standing - Orthostatic VS filed at 06/19/2024 1045              Intake/Output Summary (Last 24 hours) at 6/20/2024 0815  Last data filed at 6/20/2024 0441  Gross per 24 hour   Intake 700 ml   Output 4225 ml   Net -3525 ml         Physical Exam:    GEN: Mesfin Cano appears well, alert and oriented x 3, pleasant and cooperative; large body habitus  HEENT:  Normocephalic, atraumatic, anicteric, moist mucous membranes  NECK: No JVD or carotid bruits   HEART: irregularly regular rhythm, normal rate, normal S1 and S2, no murmurs, clicks, gallops or rubs   LUNGS: Clear to auscultation bilaterally; improved breath sounds  ABDOMEN:  Normoactive bowel sounds, soft, no tenderness, no distention  EXTREMITIES: peripheral pulses palpable; chronic swelling; non-pitting  NEURO: no gross focal findings; cranial nerves grossly intact   SKIN:  Dry, intact, warm to touch      Current Facility-Administered Medications:     acetaminophen (TYLENOL) tablet 650 mg, 650 mg, Oral, Q6H PRN, Nancy Polo MD, 650 mg at 06/19/24 2239    apixaban (ELIQUIS) tablet 5 mg, 5 mg, Oral, BID, Nancy Polo MD, 5 mg at 06/19/24 2122    atorvastatin (LIPITOR) tablet 10 mg, 10 mg, Oral, Daily, Nancy Polo MD, 10 mg at 06/19/24 0848    budesonide-formoterol (SYMBICORT) 160-4.5 mcg/act inhaler 2 puff, 2 puff, Inhalation, BID, Nancy Polo MD, 2 puff  at 06/19/24 1718    Empagliflozin (JARDIANCE) tablet 10 mg, 10 mg, Oral, Daily, Nancy Polo MD, 10 mg at 06/19/24 0849    insulin glargine (LANTUS) subcutaneous injection 20 Units 0.2 mL, 20 Units, Subcutaneous, HS, Faheem Banda DO, 20 Units at 06/19/24 2123    insulin lispro (HumALOG/ADMELOG) 100 units/mL subcutaneous injection 1-6 Units, 1-6 Units, Subcutaneous, HS, Nancy Polo MD, 2 Units at 06/19/24 2122    insulin lispro (HumALOG/ADMELOG) 100 units/mL subcutaneous injection 2-12 Units, 2-12 Units, Subcutaneous, 4 times day, 2 Units at 06/20/24 0611 **AND** Fingerstick Glucose (POCT), , , 4 times day, Nancy Polo MD    levalbuterol (XOPENEX) inhalation solution 0.63 mg, 0.63 mg, Nebulization, Q6H PRN, Nancy Polo MD    metoprolol succinate (TOPROL-XL) 24 hr tablet 50 mg, 50 mg, Oral, Daily, Nancy Polo MD, 50 mg at 06/19/24 0848    pregabalin (LYRICA) capsule 50 mg, 50 mg, Oral, HS, Nancy Polo MD, 50 mg at 06/19/24 2122    umeclidinium 62.5 mcg/actuation inhaler AEPB 1 puff, 1 puff, Inhalation, Daily, Nancy Polo MD, 1 puff at 06/19/24 0849    Labs & Results:          Results from last 7 days   Lab Units 06/20/24  0436 06/19/24  0338 06/18/24  1835   POTASSIUM mmol/L 3.5 4.8 5.4*   CO2 mmol/L 24 20* 17*   CHLORIDE mmol/L 99 97 102   BUN mg/dL 63* 43* 33*   CREATININE mg/dL 2.21* 1.83* 1.54*     Results from last 7 days   Lab Units 06/19/24  0359 06/18/24  1835   HEMOGLOBIN g/dL 8.7* 8.9*   HEMATOCRIT % 29.5* 30.7*   PLATELETS Thousands/uL 347 339     Results from last 7 days   Lab Units 06/19/24  0313   HEMOGLOBIN A1C % 8.3*       Telemetry:   Personally reviewed by Roly Nogueira MD: Rate controlled, irregular rhythm, with possible bigeminy with PACs. No further events of afib or aflutter.     ECG: Sinus rhythm with PACs bigeminy  Imaging: CXR wnl    VTE Prophylaxis: RX contraindicated due to: on eliquis         Case discussed and reviewed with Dr. Mallory who  agrees with my assessment and plan.    Thank you for involving us in the care of your patient.      Roly Nogueira MD , PGY-1  [facility]      ** Please Note: Fluency DirectDictation voice to text software may have been used in the creation of this document. **

## 2024-06-20 NOTE — ASSESSMENT & PLAN NOTE
Wt Readings from Last 3 Encounters:   06/20/24 (!) 149 kg (329 lb)   05/24/24 (!) 150 kg (331 lb 9.6 oz)   02/20/24 (!) 151 kg (332 lb 12.8 oz)     Currently appears euvolemic  Daily weights  Holding diuretics due to significant increase in creatinine over the past 24 hours  Continue to hold Aldactone

## 2024-06-20 NOTE — ASSESSMENT & PLAN NOTE
Lab Results   Component Value Date    EGFR 31 06/20/2024    EGFR 40 06/19/2024    EGFR 49 06/18/2024    CREATININE 2.21 (H) 06/20/2024    CREATININE 1.83 (H) 06/19/2024    CREATININE 1.54 (H) 06/18/2024   Creatinine elevated today we will hold further diuretics

## 2024-06-20 NOTE — ASSESSMENT & PLAN NOTE
Lab Results   Component Value Date    HGBA1C 8.3 (H) 06/19/2024       Recent Labs     06/19/24  1851 06/19/24  2120 06/20/24  0610 06/20/24  0853   POCGLU 306* 216* 162* 277*         Blood Sugar Average: Last 72 hrs:  (P) 309.0037486067392310  Continue with lyrica nightly   Start Lantus for hyperglycemia

## 2024-06-20 NOTE — ASSESSMENT & PLAN NOTE
Lab Results   Component Value Date    HGBA1C 8.3 (H) 06/19/2024       Recent Labs     06/19/24  1851 06/19/24  2120 06/20/24  0610 06/20/24  0853   POCGLU 306* 216* 162* 277*         Blood Sugar Average: Last 72 hrs:  (P) 309.8076183533361979    Monitor fingerstick glucose levels  Insulin sliding scale as needed, received 5u of regular insulin tonight  High dose ISS due to elevated blood glucose reasons likely due to steroid use

## 2024-06-20 NOTE — ED PROVIDER NOTES
Final Diagnoses:     1. Dyspnea on exertion    2. Atrial flutter with rapid ventricular response (HCC)           Nursing Triage:     Chief Complaint   Patient presents with    Chest Pain     Pt [facility] with c/o midsternal chest pain radiating to R jaw intermittent all week. Hx a fib and CHF, given 1 nitro and ASA by EMS     HPI:   This is a 59 y.o. male presenting for evaluation of SOB and chest pain.   Relevant past medical history chf, copd, afib, htn. Pt arrived complaining of SOB and increased dyspnea on exertion with pain going into the right jaw.  On route by EMS he was given 1 sublingual nitro and 324mg of aspirin.  He states that the pain started this morning.  He says that he has had similar pain before.  States nothing makes it better or worse.  He says that he stopped taking his metoprolol several days ago because he did not like how it was making him feel.  He says that he knows he has a history of A-fib.  He states that he is on blood thinners, Eliquis is taking it daily.  He says that he has not had any headache, dizziness, nausea, vomiting, diarrhea, dysuria, motor or sensory deficits.  ASSESSMENT + PLAN:   Patient showing a flutter with RVR on EKG plan to get basic labs.  Spoke to patient regarding electrocardioversion and he states that he is amenable and consent will be signed.  Sedation ordered.    Successful cardioversion into sinus rhythm.  Patient now continues to be dyspneic but not requiring oxygen and received 1 DuoNeb.  States that he still is dyspneic given this and his continuing concerns regarding his dyspnea will admit for observation    Physical:   Physical Exam  Vitals and nursing note reviewed.   Constitutional:       Appearance: Normal appearance. He is ill-appearing.   HENT:      Head: Normocephalic and atraumatic.      Nose: Nose normal.      Mouth/Throat:      Mouth: Mucous membranes are moist.      Pharynx: No oropharyngeal exudate or posterior oropharyngeal  erythema.   Eyes:      Extraocular Movements: Extraocular movements intact.      Conjunctiva/sclera: Conjunctivae normal.      Pupils: Pupils are equal, round, and reactive to light.   Cardiovascular:      Rate and Rhythm: Tachycardia present. Rhythm irregular.      Pulses: Normal pulses.           Radial pulses are 2+ on the right side and 2+ on the left side.        Dorsalis pedis pulses are 2+ on the right side and 2+ on the left side.   Pulmonary:      Effort: Tachypnea present.      Breath sounds: Wheezing present.   Abdominal:      General: Abdomen is flat. There is no distension.      Palpations: Abdomen is soft.      Tenderness: There is no abdominal tenderness.   Musculoskeletal:         General: Normal range of motion.      Cervical back: Normal range of motion.      Right lower leg: No edema.      Left lower leg: No edema.   Lymphadenopathy:      Cervical: No cervical adenopathy.   Skin:     General: Skin is warm and dry.      Capillary Refill: Capillary refill takes less than 2 seconds.   Neurological:      General: No focal deficit present.      Mental Status: He is alert and oriented to person, place, and time.      Cranial Nerves: No cranial nerve deficit.      Sensory: No sensory deficit.   Psychiatric:         Mood and Affect: Mood normal.         Behavior: Behavior normal.       ED Triage Vitals   Temperature Pulse Respirations Blood Pressure SpO2   06/18/24 1753 06/18/24 1753 06/18/24 1753 06/18/24 1753 06/18/24 1753   97.9 °F (36.6 °C) (!) 138 20 135/79 97 %      Temp Source Heart Rate Source Patient Position - Orthostatic VS BP Location FiO2 (%)   06/18/24 1753 06/18/24 1753 06/18/24 1839 06/18/24 1839 --   Oral Monitor Sitting Right arm       Pain Score       06/18/24 2200       No Pain         Vitals:    06/20/24 0439 06/20/24 0440 06/20/24 0849 06/20/24 0915   BP: 91/56 91/56 104/64 104/64   TempSrc:       Pulse: 72 74  74   Resp:       Patient Position - Orthostatic VS:       Temp: 97.6 °F  (36.4 °C) 97.6 °F (36.4 °C)       Lab Results   Component Value Date    POCGLU 277 (H) 06/20/2024    POCGLU 162 (H) 06/20/2024    POCGLU 216 (H) 06/19/2024       - There are no obvious limitations to social determinants of care.   - Nursing note reviewed.   - Vitals reviewed.   - Orders placed by myself.    - Previous chart was reviewed  - No language barrier.   - History obtained from patient.    - There are no limitations to the history obtained:     Past Medical:    has a past medical history of Acute gastritis without hemorrhage, Asthma, Atrial fibrillation (HCC), Bilateral leg edema (02/19/2020), CHF (congestive heart failure) ([facility]), Coronary artery disease, Daytime sleepiness (07/17/2019), Diabetes mellitus (HCC), Dyspnea on exertion (10/11/2021), Edema of both feet (01/23/2020), Gastric bypass status for obesity, Hyperlipidemia, Hypertension, Hypokalemia (11/14/2021), Impaired fasting glucose, Knee sprain, bilateral (06/14/2018), Leg cramps (06/16/2022), Obesity, and Viral gastroenteritis.    Past Surgical:    has a past surgical history that includes Gastric bypass (2004); Hernia repair; Carpal tunnel release; Tonsillectomy; Cardiac catheterization (N/A, 3/9/2022); Cardiac catheterization (N/A, 9/6/2022); Cardiac catheterization (N/A, 9/6/2022); Cardiac catheterization (N/A, 10/11/2023); IR biopsy bone marrow (12/14/2023); CHOLECYSTECTOMY LAPAROSCOPIC (N/A, 2/7/2024); LAPAROSCOPIC TRANSGASTRIC ACCESS FOR ERCP (N/A, 2/7/2024); and LAPAROSCOPIC TRANSGASTRIC ACCESS FOR ERCP (N/A, 2/7/2024).    Social:     Social History     Substance and Sexual Activity   Alcohol Use Yes    Alcohol/week: 2.0 standard drinks of alcohol    Types: 2 Shots of liquor per week    Comment: social     Social History     Tobacco Use   Smoking Status Former    Types: Pipe, Cigars    Start date: 2016    Quit date: 1/1/2021    Years since quitting: 3.4    Passive exposure: Never   Smokeless Tobacco Never   Tobacco Comments    cigar once a  day     Social History     Substance and Sexual Activity   Drug Use No       Code Status: Level 1 - Full Code  Advance Directive and Living Will:      Power of :    POLST:    Medications   apixaban (ELIQUIS) tablet 5 mg (5 mg Oral Given 6/20/24 0849)   atorvastatin (LIPITOR) tablet 10 mg (10 mg Oral Given 6/20/24 0849)   budesonide-formoterol (SYMBICORT) 160-4.5 mcg/act inhaler 2 puff (2 puffs Inhalation Given 6/20/24 0849)   Empagliflozin (JARDIANCE) tablet 10 mg (10 mg Oral Given 6/20/24 0850)   metoprolol succinate (TOPROL-XL) 24 hr tablet 50 mg (50 mg Oral Not Given 6/20/24 0849)   pregabalin (LYRICA) capsule 50 mg (50 mg Oral Given 6/19/24 2122)   umeclidinium 62.5 mcg/actuation inhaler AEPB 1 puff (1 puff Inhalation Given 6/20/24 0849)   acetaminophen (TYLENOL) tablet 650 mg (650 mg Oral Given 6/19/24 2239)   insulin lispro (HumALOG/ADMELOG) 100 units/mL subcutaneous injection 2-12 Units (6 Units Subcutaneous Given 6/20/24 0856)   insulin lispro (HumALOG/ADMELOG) 100 units/mL subcutaneous injection 1-6 Units (2 Units Subcutaneous Given 6/19/24 2122)   levalbuterol (XOPENEX) inhalation solution 0.63 mg (has no administration in time range)   insulin glargine (LANTUS) subcutaneous injection 20 Units 0.2 mL (20 Units Subcutaneous Given 6/19/24 2123)   aspirin chewable tablet 324 mg (0 mg Does not apply Given to EMS 6/18/24 1759)   propofol (DIPRIVAN) 200 MG/20ML bolus injection 100 mg (100 mg Intravenous Given 6/18/24 1843)   magnesium sulfate 2 g/50 mL IVPB (premix) 2 g (0 g Intravenous Stopped 6/18/24 1914)   ipratropium-albuterol (DUO-NEB) 0.5-2.5 mg/3 mL inhalation solution 3 mL (3 mL Nebulization Given 6/18/24 1949)   methylPREDNISolone sodium succinate (Solu-MEDROL) injection 125 mg (125 mg Intravenous Given 6/18/24 1949)   insulin regular (HumuLIN R,NovoLIN R) injection 5 Units (5 Units Subcutaneous Given 6/18/24 2327)     XR chest portable   Final Result      No acute cardiopulmonary disease.             Workstation performed: [id number]           Orders Placed This Encounter   Procedures    Electrical Cardioversion    XR chest portable    CBC and differential    Basic metabolic panel    HS Troponin 0hr (reflex protocol)    Hemoglobin A1c w/EAG Estimation (Prechecked if no A1C within 90 days)    HS Troponin I 2hr    HS Troponin I 4hr    TSH, 3rd generation with Free T4 reflex    Basic metabolic panel    CBC and differential    RBC Morphology Reflex Test    HS Troponin 0hr (reflex protocol)    HS Troponin I 2hr    Basic metabolic panel    HS Troponin I 4hr    Magnesium    Diet Roman/CHO Controlled; Consistent Carbohydrate Diet Level 2 (5 carb servings/75 grams CHO/meal)    Continuous pulse oximetry    Continuous cardiac monitoring    Nursing communication Continue IV as ordered.    Notify admitting physician    Notify admitting physician on arrival    Vital Signs per unit routine    Up and OOB as tolerated    I/O    Daily weights    Apply SCD or Foot pumps    Insulin Subcutaneous Notify Physician    Insulin Subcutaneous Instruction    Hypoglycemia Protocol    Insulin Subcutaneous Notify Physician    Insulin Subcutaneous Instruction    Hypoglycemia Protocol    Fingerstick Glucose (POCT)    Vital signs (specify frequency)    Continuous ST Segment Monitoring    Provide patient with the Heart Attack Education Booklet    Provide patient with the Heart Attack Zone Tool    Nursing communication ECG 12 lead with chest pain or ST segment change and notify MD. Place an ECG order if performed.    Apply SCD or Foot pumps    Orthostatic blood pressure    24 Hour Telemetry Monitoring    Level 1-Full Code: all life saving measures are indicated    Inpatient consult to Cardiology    OT eval and treat    PT eval and treat    Respiratory Protocol    ECG 12 lead    ECG 12 lead    ECG 12 lead with 2nd Troponin    ECG 12 lead with 3rd Troponin    ECG 12 lead every 30 minutes x 2 PRN if first ECG non-diagnostic and persistent CP     ECG 12 lead    ECG 12 lead    ECG 12 lead    ECG 12 lead    Echo complete w/ contrast if indicated    Place in Observation     Labs Reviewed   CBC AND DIFFERENTIAL - Abnormal       Result Value Ref Range Status    WBC 8.45  4.31 - 10.16 Thousand/uL Final    RBC 3.92  3.88 - 5.62 Million/uL Final    Hemoglobin 8.9 (*) 12.0 - 17.0 g/dL Final    Hematocrit 30.7 (*) 36.5 - 49.3 % Final    MCV 78 (*) 82 - 98 fL Final    MCH 22.7 (*) 26.8 - 34.3 pg Final    MCHC 29.0 (*) 31.4 - 37.4 g/dL Final    RDW 16.9 (*) 11.6 - 15.1 % Final    MPV 10.4  8.9 - 12.7 fL Final    Platelets 339  149 - 390 Thousands/uL Final    nRBC 0  /100 WBCs Final    Segmented % 77 (*) 43 - 75 % Final    Immature Grans % 1  0 - 2 % Final    Lymphocytes % 10 (*) 14 - 44 % Final    Monocytes % 8  4 - 12 % Final    Eosinophils Relative 3  0 - 6 % Final    Basophils Relative 1  0 - 1 % Final    Absolute Neutrophils 6.50  1.85 - 7.62 Thousands/µL Final    Absolute Immature Grans 0.10  0.00 - 0.20 Thousand/uL Final    Absolute Lymphocytes 0.88  0.60 - 4.47 Thousands/µL Final    Absolute Monocytes 0.66  0.17 - 1.22 Thousand/µL Final    Eosinophils Absolute 0.25  0.00 - 0.61 Thousand/µL Final    Basophils Absolute 0.06  0.00 - 0.10 Thousands/µL Final   BASIC METABOLIC PANEL - Abnormal    Sodium 133 (*) 135 - 147 mmol/L Final    Potassium 5.4 (*) 3.5 - 5.3 mmol/L Final    Comment: Slightly Hemolyzed:Results may be affected.    Chloride 102  96 - 108 mmol/L Final    CO2 17 (*) 21 - 32 mmol/L Final    ANION GAP 14 (*) 4 - 13 mmol/L Final    BUN 33 (*) 5 - 25 mg/dL Final    Creatinine 1.54 (*) 0.60 - 1.30 mg/dL Final    Comment: Standardized to IDMS reference method    Glucose 223 (*) 65 - 140 mg/dL Final    Comment: If the patient is fasting, the ADA then defines impaired fasting glucose as > 100 mg/dL and diabetes as > or equal to 123 mg/dL.    Calcium 8.5  8.4 - 10.2 mg/dL Final    eGFR 49  ml/min/1.73sq m Final    Narrative:     National Kidney Disease  Foundation guidelines for Chronic Kidney Disease (CKD):     Stage 1 with normal or high GFR (GFR > 90 mL/min/1.73 square meters)    Stage 2 Mild CKD (GFR = 60-89 mL/min/1.73 square meters)    Stage 3A Moderate CKD (GFR = 45-59 mL/min/1.73 square meters)    Stage 3B Moderate CKD (GFR = 30-44 mL/min/1.73 square meters)    Stage 4 Severe CKD (GFR = 15-29 mL/min/1.73 square meters)    Stage 5 End Stage CKD (GFR <15 mL/min/1.73 square meters)  Note: GFR calculation is accurate only with a steady state creatinine   POCT GLUCOSE - Abnormal    POC Glucose 390 (*) 65 - 140 mg/dl Final       Time reflects when diagnosis was documented in both MDM as applicable and the Disposition within this note       Time User Action Codes Description Comment    6/18/2024  9:57 PM Earl Sarkar [R06.09] Dyspnea on exertion     6/18/2024  9:57 PM Earl Sarkar [I48.92] Atrial flutter with rapid ventricular response (HCC)           ED Disposition       ED Disposition   Admit    Condition   Stable    Date/Time   Tue Jun 18, 2024  9:57 PM    Comment   Case was discussed with ADEN and the patient's admission status was agreed to be Admission Status: observation status  .               Follow-up Information    None       Current Discharge Medication List        CONTINUE these medications which have NOT CHANGED    Details   Accu-Chek FastClix Lancets MISC Test blood sugar twice daily  Qty: 200 each, Refills: 3    Associated Diagnoses: Type 2 diabetes mellitus with hyperglycemia, without long-term current use of insulin ([facility])      acetaminophen (TYLENOL) 325 mg tablet Take 2 tablets (650 mg total) by mouth every 6 (six) hours as needed for mild pain, headaches or fever    Associated Diagnoses: Acute gallstone pancreatitis; Choledocholithiasis      albuterol (PROVENTIL HFA,VENTOLIN HFA) 90 mcg/act inhaler Inhale 2 puffs every 4 (four) hours as needed for wheezing or shortness of breath  Qty: 18 g, Refills: 5    Comments: Substitution to a  formulary equivalent within the same pharmaceutical class is authorized.  Associated Diagnoses: Mild intermittent asthma without complication      apixaban (Eliquis) 5 mg Take 1 tablet (5 mg total) by mouth 2 (two) times a day  Qty: 60 tablet, Refills: 5    Associated Diagnoses: A-fib ([facility]); Atrial fibrillation, unspecified type ([facility])      atorvastatin (LIPITOR) 10 mg tablet TAKE 1 TABLET BY MOUTH EVERY DAY  Qty: 90 tablet, Refills: 3    Associated Diagnoses: Mixed hyperlipidemia      Blood Glucose Monitoring Suppl (Accu-Chek Guide) w/Device KIT Use 2 (two) times a day Test blood sugar twice daily  Qty: 1 kit, Refills: 0    Associated Diagnoses: Type 2 diabetes mellitus with hyperglycemia, without long-term current use of insulin ([facility])      budesonide-formoterol (Symbicort) 160-4.5 mcg/act inhaler Inhale 2 puffs 2 (two) times a day Rinse mouth after use.  Qty: 30.6 g, Refills: 2    Associated Diagnoses: Moderate persistent asthma with acute exacerbation      bumetanide (BUMEX) 2 mg tablet Take 3 tablets (6 mg total) by mouth 2 (two) times a day  Qty: 540 tablet, Refills: 1    Associated Diagnoses: Chronic heart failure with preserved ejection fraction ([facility])      Empagliflozin (JARDIANCE) 10 MG TABS tablet Take 1 tablet (10 mg total) by mouth daily  Qty: 30 tablet, Refills: 11    Associated Diagnoses: Acute on chronic right-sided heart failure ([facility]); Type 2 diabetes mellitus with stage 3b chronic kidney disease, without long-term current use of insulin ([facility])      EPINEPHrine (EPIPEN) 0.3 mg/0.3 mL SOAJ Inject 0.3 mL (0.3 mg total) into a muscle once for 1 dose  Qty: 0.6 mL, Refills: 0    Associated Diagnoses: Anaphylaxis, initial encounter      fluticasone (FLONASE) 50 mcg/act nasal spray 1 spray into each nostril 2 (two) times a day  Refills: 0    Associated Diagnoses: Nasal congestion      Klor-Con M20 20 MEQ tablet TAKE 2 TABLETS BY MOUTH 2 TIMES A DAY.  Qty: 360 tablet, Refills: 2    Associated Diagnoses:  Diuretic-induced hypokalemia      loratadine (CLARITIN) 10 mg tablet Take 1 tablet (10 mg total) by mouth daily Do not start before October 13, 2023.  Refills: 0    Associated Diagnoses: Nasal congestion      metolazone (ZAROXOLYN) 2.5 mg tablet Take 2 tablets (5 mg total) by mouth if needed (weight gain of 3+ lbs in 24 hours, 5+ lbs in one week or signs of worsening fluid retention) Take 20-30 minutes before Bumex dose and with additional potassium    Associated Diagnoses: Chronic heart failure with preserved ejection fraction (HCC)      metoprolol succinate (TOPROL-XL) 50 mg 24 hr tablet Take 1 tablet (50 mg total) by mouth daily  Qty: 30 tablet, Refills: 5    Associated Diagnoses: Chronic heart failure with preserved ejection fraction (HCC)      montelukast (SINGULAIR) 10 mg tablet Take 1 tablet (10 mg total) by mouth daily at bedtime  Qty: 90 tablet, Refills: 1    Associated Diagnoses: Moderate persistent asthma with acute exacerbation      pregabalin (LYRICA) 50 mg capsule Take 1 caps. at bedtime  Qty: 30 capsule, Refills: 5    Associated Diagnoses: Diabetic polyneuropathy associated with type 2 diabetes mellitus ([facility])      sitaGLIPtin (Januvia) 100 mg tablet TAKE 1/2 TABLET BY MOUTH EVERY DAY  Qty: 15 tablet, Refills: 5    Associated Diagnoses: Type 2 diabetes mellitus with stage 3b chronic kidney disease, without long-term current use of insulin ([facility])      spironolactone (ALDACTONE) 25 mg tablet TAKE 1 TABLET (25 MG TOTAL) BY MOUTH DAILY.  Qty: 90 tablet, Refills: 1    Associated Diagnoses: Chronic heart failure with preserved ejection fraction (HFpEF) ([facility])      tiotropium (Spiriva Respimat) 1.25 MCG/ACT AERS inhaler Inhale 2 puffs daily  Qty: 4 g, Refills: 0    Associated Diagnoses: Moderate persistent asthma with acute exacerbation           No discharge procedures on file.  Prior to Admission Medications   Prescriptions Last Dose Informant Patient Reported? Taking?   Accu-Chek FastClix Lancets MISC  Self  No No   Sig: Test blood sugar twice daily   Blood Glucose Monitoring Suppl (Accu-Chek Guide) w/Device KIT  Self No No   Sig: Use 2 (two) times a day Test blood sugar twice daily   EPINEPHrine (EPIPEN) 0.3 mg/0.3 mL SOAJ  Self No No   Sig: Inject 0.3 mL (0.3 mg total) into a muscle once for 1 dose   Empagliflozin (JARDIANCE) 10 MG TABS tablet  Self No No   Sig: Take 1 tablet (10 mg total) by mouth daily   Klor-Con M20 20 MEQ tablet   No No   Sig: TAKE 2 TABLETS BY MOUTH 2 TIMES A DAY.   acetaminophen (TYLENOL) 325 mg tablet   No No   Sig: Take 2 tablets (650 mg total) by mouth every 6 (six) hours as needed for mild pain, headaches or fever   albuterol (PROVENTIL HFA,VENTOLIN HFA) 90 mcg/act inhaler   No No   Sig: Inhale 2 puffs every 4 (four) hours as needed for wheezing or shortness of breath   apixaban (Eliquis) 5 mg  Self No No   Sig: Take 1 tablet (5 mg total) by mouth 2 (two) times a day   atorvastatin (LIPITOR) 10 mg tablet   No No   Sig: TAKE 1 TABLET BY MOUTH EVERY DAY   budesonide-formoterol (Symbicort) 160-4.5 mcg/act inhaler   No No   Sig: Inhale 2 puffs 2 (two) times a day Rinse mouth after use.   bumetanide (BUMEX) 2 mg tablet   No No   Sig: Take 3 tablets (6 mg total) by mouth 2 (two) times a day   fluticasone (FLONASE) 50 mcg/act nasal spray  Self No No   Si spray into each nostril 2 (two) times a day   loratadine (CLARITIN) 10 mg tablet  Self No No   Sig: Take 1 tablet (10 mg total) by mouth daily Do not start before 2023.   metolazone (ZAROXOLYN) 2.5 mg tablet   No No   Sig: Take 2 tablets (5 mg total) by mouth if needed (weight gain of 3+ lbs in 24 hours, 5+ lbs in one week or signs of worsening fluid retention) Take 20-30 minutes before Bumex dose and with additional potassium   metoprolol succinate (TOPROL-XL) 50 mg 24 hr tablet   No No   Sig: Take 1 tablet (50 mg total) by mouth daily   montelukast (SINGULAIR) 10 mg tablet  Self No No   Sig: Take 1 tablet (10 mg total) by mouth  "daily at bedtime   pregabalin (LYRICA) 50 mg capsule   No No   Sig: Take 1 caps. at bedtime   sitaGLIPtin (Januvia) 100 mg tablet   No No   Sig: TAKE 1/2 TABLET BY MOUTH EVERY DAY   spironolactone (ALDACTONE) 25 mg tablet   No No   Sig: TAKE 1 TABLET (25 MG TOTAL) BY MOUTH DAILY.   tiotropium (Spiriva Respimat) 1.25 MCG/ACT AERS inhaler   No No   Sig: Inhale 2 puffs daily      Facility-Administered Medications: None                        Portions of the record may have been created with voice recognition software. Occasional wrong word or \"sound a like\" substitutions may have occurred due to the inherent limitations of voice recognition software. Read the chart carefully and recognize, using context, where substitutions have occurred.     Earl Sarkar MD  06/20/24 [id number]    "

## 2024-06-20 NOTE — ASSESSMENT & PLAN NOTE
Patient with a hx of asthma, had PFT 2021 showing severe reversible obstruction  He is currently using albuterol inh, symbicort and spiriva, he has had to increase albuterol use in the last 1 week  No increase in cough or sputum production, no upper respiratory sxs  Here is is on room air, not using accessory muscle. Lungs sound clear w/o wheezing

## 2024-06-21 LAB
ANION GAP SERPL CALCULATED.3IONS-SCNC: 12 MMOL/L (ref 4–13)
BASOPHILS # BLD AUTO: 0.05 THOUSANDS/ÂΜL (ref 0–0.1)
BASOPHILS NFR BLD AUTO: 1 % (ref 0–1)
BUN SERPL-MCNC: 56 MG/DL (ref 5–25)
CALCIUM SERPL-MCNC: 7.6 MG/DL (ref 8.4–10.2)
CHLORIDE SERPL-SCNC: 101 MMOL/L (ref 96–108)
CO2 SERPL-SCNC: 25 MMOL/L (ref 21–32)
CREAT SERPL-MCNC: 1.9 MG/DL (ref 0.6–1.3)
EOSINOPHIL # BLD AUTO: 0.14 THOUSAND/ÂΜL (ref 0–0.61)
EOSINOPHIL NFR BLD AUTO: 2 % (ref 0–6)
ERYTHROCYTE [DISTWIDTH] IN BLOOD BY AUTOMATED COUNT: 17 % (ref 11.6–15.1)
FERRITIN SERPL-MCNC: 10 NG/ML (ref 24–336)
GFR SERPL CREATININE-BSD FRML MDRD: 38 ML/MIN/1.73SQ M
GLUCOSE SERPL-MCNC: 134 MG/DL (ref 65–140)
GLUCOSE SERPL-MCNC: 147 MG/DL (ref 65–140)
GLUCOSE SERPL-MCNC: 167 MG/DL (ref 65–140)
GLUCOSE SERPL-MCNC: 180 MG/DL (ref 65–140)
GLUCOSE SERPL-MCNC: 202 MG/DL (ref 65–140)
GLUCOSE SERPL-MCNC: 218 MG/DL (ref 65–140)
GLUCOSE SERPL-MCNC: 241 MG/DL (ref 65–140)
HCT VFR BLD AUTO: 26.4 % (ref 36.5–49.3)
HGB BLD-MCNC: 7.9 G/DL (ref 12–17)
IMM GRANULOCYTES # BLD AUTO: 0.06 THOUSAND/UL (ref 0–0.2)
IMM GRANULOCYTES NFR BLD AUTO: 1 % (ref 0–2)
IRON SATN MFR SERPL: 2 % (ref 15–50)
IRON SERPL-MCNC: 14 UG/DL (ref 50–212)
LYMPHOCYTES # BLD AUTO: 1.1 THOUSANDS/ÂΜL (ref 0.6–4.47)
LYMPHOCYTES NFR BLD AUTO: 13 % (ref 14–44)
MCH RBC QN AUTO: 22.8 PG (ref 26.8–34.3)
MCHC RBC AUTO-ENTMCNC: 29.9 G/DL (ref 31.4–37.4)
MCV RBC AUTO: 76 FL (ref 82–98)
MONOCYTES # BLD AUTO: 0.6 THOUSAND/ÂΜL (ref 0.17–1.22)
MONOCYTES NFR BLD AUTO: 7 % (ref 4–12)
NEUTROPHILS # BLD AUTO: 6.45 THOUSANDS/ÂΜL (ref 1.85–7.62)
NEUTS SEG NFR BLD AUTO: 76 % (ref 43–75)
NRBC BLD AUTO-RTO: 0 /100 WBCS
PLATELET # BLD AUTO: 291 THOUSANDS/UL (ref 149–390)
PMV BLD AUTO: 10.3 FL (ref 8.9–12.7)
POTASSIUM SERPL-SCNC: 3.4 MMOL/L (ref 3.5–5.3)
RBC # BLD AUTO: 3.46 MILLION/UL (ref 3.88–5.62)
SODIUM SERPL-SCNC: 138 MMOL/L (ref 135–147)
TIBC SERPL-MCNC: 687 UG/DL (ref 250–450)
UIBC SERPL-MCNC: 673 UG/DL (ref 155–355)
WBC # BLD AUTO: 8.4 THOUSAND/UL (ref 4.31–10.16)

## 2024-06-21 PROCEDURE — 94664 DEMO&/EVAL PT USE INHALER: CPT

## 2024-06-21 PROCEDURE — 85025 COMPLETE CBC W/AUTO DIFF WBC: CPT | Performed by: INTERNAL MEDICINE

## 2024-06-21 PROCEDURE — 99232 SBSQ HOSP IP/OBS MODERATE 35: CPT | Performed by: INTERNAL MEDICINE

## 2024-06-21 PROCEDURE — 83550 IRON BINDING TEST: CPT | Performed by: INTERNAL MEDICINE

## 2024-06-21 PROCEDURE — NC001 PR NO CHARGE: Performed by: INTERNAL MEDICINE

## 2024-06-21 PROCEDURE — 82948 REAGENT STRIP/BLOOD GLUCOSE: CPT

## 2024-06-21 PROCEDURE — 80048 BASIC METABOLIC PNL TOTAL CA: CPT | Performed by: INTERNAL MEDICINE

## 2024-06-21 PROCEDURE — 83540 ASSAY OF IRON: CPT | Performed by: INTERNAL MEDICINE

## 2024-06-21 PROCEDURE — 82728 ASSAY OF FERRITIN: CPT | Performed by: INTERNAL MEDICINE

## 2024-06-21 RX ORDER — ALBUTEROL SULFATE 2.5 MG/3ML
2.5 SOLUTION RESPIRATORY (INHALATION) EVERY 4 HOURS PRN
Status: DISCONTINUED | OUTPATIENT
Start: 2024-06-21 | End: 2024-06-23

## 2024-06-21 RX ORDER — POTASSIUM CHLORIDE 20 MEQ/1
40 TABLET, EXTENDED RELEASE ORAL 2 TIMES DAILY
Status: COMPLETED | OUTPATIENT
Start: 2024-06-21 | End: 2024-06-22

## 2024-06-21 RX ORDER — BUMETANIDE 2 MG/1
6 TABLET ORAL 2 TIMES DAILY
Status: DISCONTINUED | OUTPATIENT
Start: 2024-06-21 | End: 2024-06-26 | Stop reason: HOSPADM

## 2024-06-21 RX ADMIN — POTASSIUM CHLORIDE 40 MEQ: 1500 TABLET, EXTENDED RELEASE ORAL at 17:13

## 2024-06-21 RX ADMIN — ATORVASTATIN CALCIUM 10 MG: 10 TABLET, FILM COATED ORAL at 08:16

## 2024-06-21 RX ADMIN — BUMETANIDE 6 MG: 2 TABLET ORAL at 10:01

## 2024-06-21 RX ADMIN — INSULIN LISPRO 5 UNITS: 100 INJECTION, SOLUTION INTRAVENOUS; SUBCUTANEOUS at 17:17

## 2024-06-21 RX ADMIN — INSULIN GLARGINE 20 UNITS: 100 INJECTION, SOLUTION SUBCUTANEOUS at 21:38

## 2024-06-21 RX ADMIN — INSULIN LISPRO 2 UNITS: 100 INJECTION, SOLUTION INTRAVENOUS; SUBCUTANEOUS at 21:48

## 2024-06-21 RX ADMIN — UMECLIDINIUM 1 PUFF: 62.5 AEROSOL, POWDER ORAL at 10:32

## 2024-06-21 RX ADMIN — INSULIN LISPRO 5 UNITS: 100 INJECTION, SOLUTION INTRAVENOUS; SUBCUTANEOUS at 12:21

## 2024-06-21 RX ADMIN — APIXABAN 5 MG: 5 TABLET, FILM COATED ORAL at 08:15

## 2024-06-21 RX ADMIN — POTASSIUM CHLORIDE 40 MEQ: 1500 TABLET, EXTENDED RELEASE ORAL at 12:21

## 2024-06-21 RX ADMIN — INSULIN LISPRO 5 UNITS: 100 INJECTION, SOLUTION INTRAVENOUS; SUBCUTANEOUS at 08:18

## 2024-06-21 RX ADMIN — INSULIN LISPRO 4 UNITS: 100 INJECTION, SOLUTION INTRAVENOUS; SUBCUTANEOUS at 11:00

## 2024-06-21 RX ADMIN — PREGABALIN 50 MG: 50 CAPSULE ORAL at 21:38

## 2024-06-21 RX ADMIN — BUDESONIDE AND FORMOTEROL FUMARATE DIHYDRATE 2 PUFF: 160; 4.5 AEROSOL RESPIRATORY (INHALATION) at 17:13

## 2024-06-21 RX ADMIN — APIXABAN 5 MG: 5 TABLET, FILM COATED ORAL at 21:38

## 2024-06-21 RX ADMIN — EMPAGLIFLOZIN 10 MG: 10 TABLET, FILM COATED ORAL at 10:32

## 2024-06-21 RX ADMIN — BUDESONIDE AND FORMOTEROL FUMARATE DIHYDRATE 2 PUFF: 160; 4.5 AEROSOL RESPIRATORY (INHALATION) at 08:17

## 2024-06-21 RX ADMIN — BUMETANIDE 6 MG: 2 TABLET ORAL at 17:13

## 2024-06-21 NOTE — ASSESSMENT & PLAN NOTE
Lab Results   Component Value Date    HGBA1C 8.3 (H) 06/19/2024       Recent Labs     06/21/24  0749 06/21/24  1038 06/21/24  1216 06/21/24  1423   POCGLU 134 202* 180* 241*         Blood Sugar Average: Last 72 hrs:  (P) [id number]  Continue with lyrica nightly   Start Lantus for hyperglycemia

## 2024-06-21 NOTE — PLAN OF CARE
Problem: PAIN - ADULT  Goal: Verbalizes/displays adequate comfort level or baseline comfort level  Description: Interventions:  - Encourage patient to monitor pain and request assistance  - Assess pain using appropriate pain scale  - Administer analgesics based on type and severity of pain and evaluate response  - Implement non-pharmacological measures as appropriate and evaluate response  - Consider cultural and social influences on pain and pain management  - Notify physician/advanced practitioner if interventions unsuccessful or patient reports new pain  Outcome: Progressing     Problem: INFECTION - ADULT  Goal: Absence or prevention of progression during hospitalization  Description: INTERVENTIONS:  - Assess and monitor for signs and symptoms of infection  - Monitor lab/diagnostic results  - Monitor all insertion sites, i.e. indwelling lines, tubes, and drains  - Monitor endotracheal if appropriate and nasal secretions for changes in amount and color  - [place] appropriate cooling/warming therapies per order  - Administer medications as ordered  - Instruct and encourage patient and family to use good hand hygiene technique  - Identify and instruct in appropriate isolation precautions for identified infection/condition  Outcome: Progressing  Goal: Absence of fever/infection during neutropenic period  Description: INTERVENTIONS:  - Monitor WBC    Outcome: Progressing     Problem: SAFETY ADULT  Goal: Patient will remain free of falls  Description: INTERVENTIONS:  - Educate patient/family on patient safety including physical limitations  - Instruct patient to call for assistance with activity   - Consult OT/PT to assist with strengthening/mobility   - Keep Call bell within reach  - Keep bed low and locked with side rails adjusted as appropriate  - Keep care items and personal belongings within reach  - Initiate and maintain comfort rounds  - Make Fall Risk Sign visible to staff  - Offer Toileting every 2 Hours,  in advance of need  - Initiate/Maintain 2alarm  - Obtain necessary fall risk management equipment: 2  - Apply yellow socks and bracelet for high fall risk patients  - Consider moving patient to room near nurses station  Outcome: Progressing  Goal: Maintain or return to baseline ADL function  Description: INTERVENTIONS:  -  Assess patient's ability to carry out ADLs; assess patient's baseline for ADL function and identify physical deficits which impact ability to perform ADLs (bathing, care of mouth/teeth, toileting, grooming, dressing, etc.)  - Assess/evaluate cause of self-care deficits   - Assess range of motion  - Assess patient's mobility; develop plan if impaired  - Assess patient's need for assistive devices and provide as appropriate  - Encourage maximum independence but intervene and supervise when necessary  - Involve family in performance of ADLs  - Assess for home care needs following discharge   - Consider OT consult to assist with ADL evaluation and planning for discharge  - Provide patient education as appropriate  Outcome: Progressing  Goal: Maintains/Returns to pre admission functional level  Description: INTERVENTIONS:  - Perform AM-PAC 6 Click Basic Mobility/ Daily Activity assessment daily.  - Set and communicate daily mobility goal to care team and patient/family/caregiver.   - Collaborate with rehabilitation services on mobility goals if consulted  - Perform Range of Motion 2 times a day.  - Reposition patient every 2 hours.  - Dangle patient 2 times a day  - Stand patient 2 times a day  - Ambulate patient 2 times a day  - Out of bed to chair 2 times a day   - Out of bed for meals 2 times a day  - Out of bed for toileting  - Record patient progress and toleration of activity level   Outcome: Progressing     Problem: DISCHARGE PLANNING  Goal: Discharge to home or other facility with appropriate resources  Description: INTERVENTIONS:  - Identify barriers to discharge w/patient and caregiver  -  Arrange for needed discharge resources and transportation as appropriate  - Identify discharge learning needs (meds, wound care, etc.)  - Arrange for interpretive services to assist at discharge as needed  - Refer to Case Management Department for coordinating discharge planning if the patient needs post-hospital services based on physician/advanced practitioner order or complex needs related to functional status, cognitive ability, or social support system  Outcome: Progressing     Problem: Knowledge Deficit  Goal: Patient/family/caregiver demonstrates understanding of disease process, treatment plan, medications, and discharge instructions  Description: Complete learning assessment and assess knowledge base.  Interventions:  - Provide teaching at level of understanding  - Provide teaching via preferred learning methods  Outcome: Progressing

## 2024-06-21 NOTE — ASSESSMENT & PLAN NOTE
Lab Results   Component Value Date    HGBA1C 8.3 (H) 06/19/2024       Recent Labs     06/21/24  0749 06/21/24  1038 06/21/24  1216 06/21/24  1423   POCGLU 134 202* 180* 241*         Blood Sugar Average: Last 72 hrs:  (P) 256.7369879898623483    Monitor fingerstick glucose levels  Insulin sliding scale as needed, received 5u of regular insulin tonight  High dose ISS due to elevated blood glucose reasons likely due to steroid use

## 2024-06-21 NOTE — ASSESSMENT & PLAN NOTE
Wt Readings from Last 3 Encounters:   06/21/24 (!) 148 kg (326 lb 12.8 oz)   05/24/24 (!) 150 kg (331 lb 9.6 oz)   02/20/24 (!) 151 kg (332 lb 12.8 oz)     Currently appears euvolemic  Daily weights  Resume diuretics today per cardiology  Continue to hold Aldactone

## 2024-06-21 NOTE — PROGRESS NOTES
Cardiology Team 2 Progress Note - Mesfin Cano 59 y.o. male MRN: [id number]    Unit/Bed#: -01 Encounter: [id number]        Assessment:  Principal Problem:    Uncontrolled atrial flutter (HCC)  Active Problems:    Type 2 diabetes mellitus with hyperglycemia, without long-term current use of insulin (HCC)    Hyponatremia    CKD (chronic kidney disease) stage 3 (HCC)    Diabetic polyneuropathy associated with type 2 diabetes mellitus (HCC)    Chronic diastolic CHF (HCC)    Anemia due to chronic kidney disease    Hyperkalemia    Moderate persistent asthma without complication      Patient is a 59-year-old M with PMH of HFpEF, A-fib on Eliquis, HTN, T2DM, CKD, asthma admitted for 1 week history of CP and SOB. Found with symptomatic atrial flutter s/p electrical cardioversion upon arrival to ED with conversion to NSR. Metoprolol, home bumex restarted, and given 1 x lasix 40 IV yesterday with good UOP, but now dry and with soft BP. Had similar CP overnight with flat troponin, and ECG revealing irregularly regular rhythm. Pending ECHO read.      ##Atrial flutter s/p cardioversion to NSR  ##Atrial fibrillation  Patient has a history of rate controlled atrial fibrillation with metoprolol and Eliquis anticoagulation.  However, unsure when his last event of atrial fibrillation was.  Looking at prior ECG strips, they appear for the most part in sinus rhythm.  Patient recently held his metoprolol due to symptoms of dizziness/ lightheadedness. Upon restarted BB and diuretics, patient's BP soft and kidney function increased. Most likely over-corrected volume status.      ##Chest pain and SOB  Same characteristic pain as presenting CP. Troponin remains around 58-60, but without delta. No new events of aflutter on telemetry but does have notable irregular rhythm. Prior LHC negative for CAD. ECHO currently pending. No ST changes on ECG. Unresponsive to NTG. No reproducible chest wall tenderness. CXR unremarkable. Thus, likely  transient demand ischemia from atrial flutter. Had repeat episode of CP without aflutter on tele, but does have bigeminy with PACs.  ##Hypertension  Currently softer pressures after lasix 40mg and resuming bumex 6mg BID.  Metoprolol not meeting parameter.  Previously prescribed Entresto but unable to afford the medication.  ## T2DM  Hyperglycemic.  Last A1c 12/2023 9.3.  Does not take insulin at home.  Jardiance 10 mg, Januvia 50 mg daily  ##HFpEF  HFpEF most likely from arrhythmia, HTN, obesity.  Last TTE on 4/2023 EF 50% G2 DD.  LHC on 9/2022 with no obstructive CAD. CardioMEMS Pad (which correlates to RHC) reading 15 on 5/22. History of admissions from acute heart failure.  Patient appears euvolemic.  Near his dry weight.  Takes Bumex 6 mg twice daily with metolazone 5 mg as needed.  ##Asthma/COPD  Cw primary management.         Plan:  - Brief episodes of bradycardia on telemetry; no further events of atrial flutter  - ECHO showing Ef 55%, mild concentric hypertrophy, akinetic segments in basal inferolateral and mid inferolateral, L atrium moderately dilated  - Continue to try and ambulate patient to see if SOB/CP is induced by exertion  - Metoprolol continues to be held due to parameters  - Cw eliquis  - Plan to restart bumex this AM in setting of improving ANTONIO and soft BP  - STAT troponin/ECG in the event of cardiac symptoms  - Continue with control over hyperglycemia  - Would recommend up-titrating atorvastatin to 40mg daily  - Patent to follow up with cardiology upon dc      Subjective:   Patient seen and examined.  No significant events overnight. Milder episodes of CP. Has not been able to ambulate much. Denies palpitations. Does have episodes of bradycardia on telemetry to 40-50s, lasting only few seconds. Patient states his sx of lightheadedness and mild CP may be associated with those rates.     Vitals: Blood pressure 104/62, pulse 80, temperature 97.5 °F (36.4 °C), temperature source Oral, resp. rate  "18, height 6' 1\" (1.854 m), weight (!) 148 kg (326 lb 12.8 oz), SpO2 96%., Body mass index is 43.12 kg/m².,   Orthostatic Blood Pressures      Flowsheet Row Most Recent Value   Blood Pressure 104/62 filed at 06/21/2024 1106   Patient Position - Orthostatic VS Lying filed at 06/21/2024 0605              Intake/Output Summary (Last 24 hours) at 6/21/2024 1231  Last data filed at 6/21/2024 1106  Gross per 24 hour   Intake 1260 ml   Output 2725 ml   Net -1465 ml         Physical Exam:    GEN: Mesfin Cano appears well, alert and oriented x 3, pleasant and cooperative; large body habitus  HEENT:  Normocephalic, atraumatic, anicteric, moist mucous membranes  NECK: No JVD or carotid bruits   HEART: irregularly regular rhythm, normal rate, normal S1 and S2, no murmurs, clicks, gallops or rubs   LUNGS: Clear to auscultation bilaterally; improved breath sounds  ABDOMEN:  Normoactive bowel sounds, soft, no tenderness, no distention  EXTREMITIES: peripheral pulses palpable; chronic swelling; non-pitting  NEURO: no gross focal findings; cranial nerves grossly intact   SKIN:  Dry, intact, warm to touch      Current Facility-Administered Medications:     acetaminophen (TYLENOL) tablet 650 mg, 650 mg, Oral, Q6H PRN, Nancy Polo MD, 650 mg at 06/19/24 2239    albuterol inhalation solution 2.5 mg, 2.5 mg, Nebulization, Q4H PRN, Faheem Banda DO    apixaban (ELIQUIS) tablet 5 mg, 5 mg, Oral, BID, Nancy Polo MD, 5 mg at 06/21/24 0815    atorvastatin (LIPITOR) tablet 10 mg, 10 mg, Oral, Daily, Nancy Polo MD, 10 mg at 06/21/24 0816    budesonide-formoterol (SYMBICORT) 160-4.5 mcg/act inhaler 2 puff, 2 puff, Inhalation, BID, Nancy Polo MD, 2 puff at 06/21/24 0817    bumetanide (BUMEX) tablet 6 mg, 6 mg, Oral, BID, Sobhan Mohamud, MD, 6 mg at 06/21/24 1001    Empagliflozin (JARDIANCE) tablet 10 mg, 10 mg, Oral, Daily, Nancy Polo MD, 10 mg at 06/21/24 1032    insulin glargine (LANTUS) subcutaneous " injection 20 Units 0.2 mL, 20 Units, Subcutaneous, HS, Faheem Dos Santosai, DO, 20 Units at 06/20/24 2141    insulin lispro (HumALOG/ADMELOG) 100 units/mL subcutaneous injection 1-6 Units, 1-6 Units, Subcutaneous, HS, Nancy Polo MD, 1 Units at 06/20/24 2143    insulin lispro (HumALOG/ADMELOG) 100 units/mL subcutaneous injection 5 Units, 5 Units, Subcutaneous, TID With Meals, Faheem Banda DO, 5 Units at 06/21/24 1221    metoprolol succinate (TOPROL-XL) 24 hr tablet 50 mg, 50 mg, Oral, Daily, Nancy Polo MD, 50 mg at 06/19/24 0848    potassium chloride (Klor-Con M20) CR tablet 40 mEq, 40 mEq, Oral, BID, Faheem Banda DO, 40 mEq at 06/21/24 1221    pregabalin (LYRICA) capsule 50 mg, 50 mg, Oral, HS, Nancy Polo MD, 50 mg at 06/20/24 2141    umeclidinium 62.5 mcg/actuation inhaler AEPB 1 puff, 1 puff, Inhalation, Daily, Nancy Polo MD, 1 puff at 06/21/24 1032    Labs & Results:          Results from last 7 days   Lab Units 06/21/24  0434 06/20/24  0436 06/19/24  0338   POTASSIUM mmol/L 3.4* 3.5 4.8   CO2 mmol/L 25 24 20*   CHLORIDE mmol/L 101 99 97   BUN mg/dL 56* 63* 43*   CREATININE mg/dL 1.90* 2.21* 1.83*     Results from last 7 days   Lab Units 06/21/24  0434 06/19/24  0359 06/18/24  1835   HEMOGLOBIN g/dL 7.9* 8.7* 8.9*   HEMATOCRIT % 26.4* 29.5* 30.7*   PLATELETS Thousands/uL 291 347 339     Results from last 7 days   Lab Units 06/19/24  0313   HEMOGLOBIN A1C % 8.3*       Telemetry:   Personally reviewed by Roly Nogueira MD: Brief episodes of bradycardia. Rate controlled with occasional irregular rhythm with PACs. No events of afib or flutter.     ECG: No new  Imaging: No new    VTE Prophylaxis: RX contraindicated due to: on Eliquis         Case discussed and reviewed with Dr. Mallory who agrees with my assessment and plan.    Thank you for involving us in the care of your patient.      Roly Nogueira MD , PGY-1  [facility]      ** Please Note: Fluency DirectDictation voice to  text software may have been used in the creation of this document. **

## 2024-06-21 NOTE — PROGRESS NOTES
[facility]  Progress Note  Name: Mesfin Cano I  MRN: [id number]  Unit/Bed#: -01 I Date of Admission: 6/18/2024   Date of Service: 6/21/2024 I Hospital Day: 1    Assessment & Plan   Moderate persistent asthma without complication  Assessment & Plan  Patient with a hx of asthma, had PFT 2021 showing severe reversible obstruction  He is currently using albuterol inh, symbicort and spiriva, he has had to increase albuterol use in the last 1 week  No increase in cough or sputum production, no upper respiratory sxs  Here is is on room air, not using accessory muscle. Lungs sound clear w/o wheezing       Hyperkalemia  Assessment & Plan  Resolved     Anemia due to chronic kidney disease  Assessment & Plan  Hemoglobin currently at baseline  Outpatient follow up, may benefit from replacement iron     Chronic diastolic CHF (HCC)  Assessment & Plan  Wt Readings from Last 3 Encounters:   06/21/24 (!) 148 kg (326 lb 12.8 oz)   05/24/24 (!) 150 kg (331 lb 9.6 oz)   02/20/24 (!) 151 kg (332 lb 12.8 oz)     Currently appears euvolemic  Daily weights  Resume diuretics today per cardiology  Continue to hold Aldactone            Diabetic polyneuropathy associated with type 2 diabetes mellitus (HCC)  Assessment & Plan  Lab Results   Component Value Date    HGBA1C 8.3 (H) 06/19/2024       Recent Labs     06/21/24  0749 06/21/24  1038 06/21/24  1216 06/21/24  1423   POCGLU 134 202* 180* 241*         Blood Sugar Average: Last 72 hrs:  (P) 256.5621488146786458  Continue with lyrica nightly   Start Lantus for hyperglycemia    CKD (chronic kidney disease) stage 3 (HCC)  Assessment & Plan  Lab Results   Component Value Date    EGFR 38 06/21/2024    EGFR 31 06/20/2024    EGFR 40 06/19/2024    CREATININE 1.90 (H) 06/21/2024    CREATININE 2.21 (H) 06/20/2024    CREATININE 1.83 (H) 06/19/2024   Monitor Creatinine with resumption of diuretics    Hyponatremia  Assessment & Plan  Sodium levels  improved  Continue to monitor    Type 2 diabetes mellitus with hyperglycemia, without long-term current use of insulin (HCC)  Assessment & Plan  Lab Results   Component Value Date    HGBA1C 8.3 (H) 06/19/2024       Recent Labs     06/21/24  0749 06/21/24  1038 06/21/24  1216 06/21/24  1423   POCGLU 134 202* 180* 241*         Blood Sugar Average: Last 72 hrs:  (P) 256.6991367636503166    Monitor fingerstick glucose levels  Insulin sliding scale as needed, received 5u of regular insulin tonight  High dose ISS due to elevated blood glucose reasons likely due to steroid use      * Uncontrolled atrial flutter (HCC)  Assessment & Plan  Patient presented with 1 week history of shortness of breath and chest pain  On presentation to emergency room he was noted to have atrial flutter with heart rate in the 140s  He is status post electrical cardioversion in the ED with return to normal sinus rhythm  He admits to not taking his metoprolol the last 2 days due to feeling lightheaded/dizzy - suspect due to tachycardia induced hypotension worsened by BB  Place in observation unit  Cardiac telemetry  Follow-up TSH, troponins w/EKG  Echocardiogram   Continue with metoprolol 50 mg daily, first dose now  Continue with Eliquis 5 mg twice daily  Appreaciate cardiology input will continue on home beta-blocker and monitor on telemetry for further episodes of atrial flutter, if this occurs we will have discussion about ablation                 VTE Pharmacologic Prophylaxis:   Moderate Risk (Score 3-4) - Pharmacological DVT Prophylaxis Ordered: apixaban (Eliquis).    Mobility:   Basic Mobility Inpatient Raw Score: 22  JH-HLM Goal: 7: Walk 25 feet or more  JH-HLM Achieved: 7: Walk 25 feet or more  JH-HLM Goal achieved. Continue to encourage appropriate mobility.    Patient Centered Rounds: I performed bedside rounds with nursing staff today.   Discussions with Specialists or Other Care Team Provider:     Education and Discussions with Family  / Patient: Patient declined call to .     Total Time Spent on Date of Encounter in care of patient:  mins. This time was spent on one or more of the following: performing physical exam; counseling and coordination of care; obtaining or reviewing history; documenting in the medical record; reviewing/ordering tests, medications or procedures; communicating with other healthcare professionals and discussing with patient's family/caregivers.    Current Length of Stay: 1 day(s)  Current Patient Status: Inpatient   Certification Statement: The patient will continue to require additional inpatient hospital stay due to diruetics  Discharge Plan: Anticipate discharge in 24-48 hrs to home.    Code Status: Level 1 - Full Code    Subjective:   Patient denies any acute complaints today overnight prior to change rooms she reports some chest discomfort when ambulating    Objective:     Vitals:   Temp (24hrs), Av.1 °F (36.7 °C), Min:97.5 °F (36.4 °C), Max:98.5 °F (36.9 °C)    Temp:  [97.5 °F (36.4 °C)-98.5 °F (36.9 °C)] 98.4 °F (36.9 °C)  HR:  [51-86] 81  Resp:  [16-20] 20  BP: (100-130)/(52-65) 128/64  SpO2:  [96 %-98 %] 97 %  Body mass index is 43.12 kg/m².     Input and Output Summary (last 24 hours):     Intake/Output Summary (Last 24 hours) at 2024 1441  Last data filed at 2024 1106  Gross per 24 hour   Intake 1260 ml   Output 2725 ml   Net -1465 ml       Physical Exam:   Physical Exam  Vitals and nursing note reviewed.   Constitutional:       General: He is not in acute distress.     Appearance: He is well-developed. He is not toxic-appearing or diaphoretic.   HENT:      Head: Normocephalic and atraumatic.   Eyes:      General: No scleral icterus.     Conjunctiva/sclera: Conjunctivae normal.   Cardiovascular:      Rate and Rhythm: Normal rate. Rhythm irregular.      Heart sounds: No murmur heard.     No friction rub. No gallop.   Pulmonary:      Effort: Pulmonary effort is normal. No respiratory  distress.      Breath sounds: Normal breath sounds. No stridor. No wheezing, rhonchi or rales.   Chest:      Chest wall: No tenderness.   Abdominal:      General: There is no distension.      Palpations: Abdomen is soft. There is no mass.      Tenderness: There is no abdominal tenderness. There is no guarding or rebound.      Hernia: No hernia is present.   Musculoskeletal:         General: No swelling.      Cervical back: Neck supple.      Right lower leg: Edema present.      Left lower leg: Edema present.   Skin:     General: Skin is warm and dry.      Capillary Refill: Capillary refill takes less than 2 seconds.   Neurological:      Mental Status: He is alert and oriented to person, place, and time.   Psychiatric:         Mood and Affect: Mood normal.          Additional Data:     Labs:  Results from last 7 days   Lab Units 06/21/24  0434   WBC Thousand/uL 8.40   HEMOGLOBIN g/dL 7.9*   HEMATOCRIT % 26.4*   PLATELETS Thousands/uL 291   SEGS PCT % 76*   LYMPHO PCT % 13*   MONO PCT % 7   EOS PCT % 2     Results from last 7 days   Lab Units 06/21/24  0434   SODIUM mmol/L 138   POTASSIUM mmol/L 3.4*   CHLORIDE mmol/L 101   CO2 mmol/L 25   BUN mg/dL 56*   CREATININE mg/dL 1.90*   ANION GAP mmol/L 12   CALCIUM mg/dL 7.6*   GLUCOSE RANDOM mg/dL 147*         Results from last 7 days   Lab Units 06/21/24  1423 06/21/24  1216 06/21/24  1038 06/21/24  0749 06/20/24  2117 06/20/24  1652 06/20/24  1419 06/20/24  0853 06/20/24  0610 06/19/24  2120 06/19/24  1851 06/19/24  1655   POC GLUCOSE mg/dl 241* 180* 202* 134 153* 113 187* 277* 162* 216* 306* 228*     Results from last 7 days   Lab Units 06/19/24  0313   HEMOGLOBIN A1C % 8.3*           Lines/Drains:  Invasive Devices       Peripheral Intravenous Line  Duration             Peripheral IV 06/19/24 Left;Proximal;Ventral (anterior) Forearm 1 day              Drain  Duration             Closed/Suction Drain RUQ Bulb 15 Fr. 134 days                      Telemetry:  Telemetry  Orders (From admission, onward)               24 Hour Telemetry Monitoring  Continuous x 24 Hours (Telem)        Question:  Reason for 24 Hour Telemetry  Answer:  PCI/EP study (including pacer and ICD implementation), Cardiac surgery, MI, abnormal cardiac cath, and chest pain- rule out MI                                Imaging: No pertinent imaging reviewed.    Recent Cultures (last 7 days):         Last 24 Hours Medication List:   Current Facility-Administered Medications   Medication Dose Route Frequency Provider Last Rate    acetaminophen  650 mg Oral Q6H PRN Nancy Polo MD      albuterol  2.5 mg Nebulization Q4H PRN Faheem Banda DO      apixaban  5 mg Oral BID Nancy Polo MD      atorvastatin  10 mg Oral Daily Nancy Polo MD      budesonide-formoterol  2 puff Inhalation BID Nancy Polo MD      bumetanide  6 mg Oral BID Yamileth Mallory MD      Empagliflozin  10 mg Oral Daily Nancy Polo MD      insulin glargine  20 Units Subcutaneous HS Faheem Banda DO      insulin lispro  1-6 Units Subcutaneous HS Nancy Polo MD      insulin lispro  5 Units Subcutaneous TID With Meals Faheem Banda DO      metoprolol succinate  50 mg Oral Daily Nancy Polo MD      potassium chloride  40 mEq Oral BID Faheem Banda DO      pregabalin  50 mg Oral HS Nancy Polo MD      umeclidinium  1 puff Inhalation Daily Nancy Polo MD          Today, Patient Was Seen By: Faheem Banda DO    **Please Note: This note may have been constructed using a voice recognition system.**

## 2024-06-21 NOTE — ASSESSMENT & PLAN NOTE
Lab Results   Component Value Date    EGFR 38 06/21/2024    EGFR 31 06/20/2024    EGFR 40 06/19/2024    CREATININE 1.90 (H) 06/21/2024    CREATININE 2.21 (H) 06/20/2024    CREATININE 1.83 (H) 06/19/2024   Monitor Creatinine with resumption of diuretics

## 2024-06-22 LAB
ALBUMIN SERPL BCG-MCNC: 3.8 G/DL (ref 3.5–5)
ALP SERPL-CCNC: 94 U/L (ref 34–104)
ALT SERPL W P-5'-P-CCNC: 8 U/L (ref 7–52)
ANION GAP SERPL CALCULATED.3IONS-SCNC: 12 MMOL/L (ref 4–13)
ANION GAP SERPL CALCULATED.3IONS-SCNC: 9 MMOL/L (ref 4–13)
AST SERPL W P-5'-P-CCNC: 11 U/L (ref 13–39)
ATRIAL RATE: 86 BPM
BASOPHILS # BLD AUTO: 0.05 THOUSANDS/ÂΜL (ref 0–0.1)
BASOPHILS NFR BLD AUTO: 1 % (ref 0–1)
BILIRUB SERPL-MCNC: 0.68 MG/DL (ref 0.2–1)
BUN SERPL-MCNC: 47 MG/DL (ref 5–25)
BUN SERPL-MCNC: 51 MG/DL (ref 5–25)
CALCIUM SERPL-MCNC: 7.6 MG/DL (ref 8.4–10.2)
CALCIUM SERPL-MCNC: 7.8 MG/DL (ref 8.4–10.2)
CHLORIDE SERPL-SCNC: 97 MMOL/L (ref 96–108)
CHLORIDE SERPL-SCNC: 98 MMOL/L (ref 96–108)
CO2 SERPL-SCNC: 26 MMOL/L (ref 21–32)
CO2 SERPL-SCNC: 26 MMOL/L (ref 21–32)
CREAT SERPL-MCNC: 1.65 MG/DL (ref 0.6–1.3)
CREAT SERPL-MCNC: 1.78 MG/DL (ref 0.6–1.3)
EOSINOPHIL # BLD AUTO: 0.22 THOUSAND/ÂΜL (ref 0–0.61)
EOSINOPHIL NFR BLD AUTO: 2 % (ref 0–6)
ERYTHROCYTE [DISTWIDTH] IN BLOOD BY AUTOMATED COUNT: 16.9 % (ref 11.6–15.1)
GFR SERPL CREATININE-BSD FRML MDRD: 41 ML/MIN/1.73SQ M
GFR SERPL CREATININE-BSD FRML MDRD: 45 ML/MIN/1.73SQ M
GLUCOSE SERPL-MCNC: 150 MG/DL (ref 65–140)
GLUCOSE SERPL-MCNC: 153 MG/DL (ref 65–140)
GLUCOSE SERPL-MCNC: 179 MG/DL (ref 65–140)
GLUCOSE SERPL-MCNC: 181 MG/DL (ref 65–140)
GLUCOSE SERPL-MCNC: 198 MG/DL (ref 65–140)
GLUCOSE SERPL-MCNC: 235 MG/DL (ref 65–140)
HCT VFR BLD AUTO: 27.5 % (ref 36.5–49.3)
HGB BLD-MCNC: 8.1 G/DL (ref 12–17)
HGB RETIC QN AUTO: 21.8 PG (ref 30–38.3)
IMM GRANULOCYTES # BLD AUTO: 0.11 THOUSAND/UL (ref 0–0.2)
IMM GRANULOCYTES NFR BLD AUTO: 1 % (ref 0–2)
IMM RETICS NFR: 33.2 % (ref 0–14)
LYMPHOCYTES # BLD AUTO: 1.05 THOUSANDS/ÂΜL (ref 0.6–4.47)
LYMPHOCYTES NFR BLD AUTO: 11 % (ref 14–44)
MAGNESIUM SERPL-MCNC: 2.2 MG/DL (ref 1.9–2.7)
MCH RBC QN AUTO: 22.4 PG (ref 26.8–34.3)
MCHC RBC AUTO-ENTMCNC: 29.5 G/DL (ref 31.4–37.4)
MCV RBC AUTO: 76 FL (ref 82–98)
MONOCYTES # BLD AUTO: 0.78 THOUSAND/ÂΜL (ref 0.17–1.22)
MONOCYTES NFR BLD AUTO: 8 % (ref 4–12)
NEUTROPHILS # BLD AUTO: 7.79 THOUSANDS/ÂΜL (ref 1.85–7.62)
NEUTS SEG NFR BLD AUTO: 77 % (ref 43–75)
NRBC BLD AUTO-RTO: 0 /100 WBCS
P AXIS: 82 DEGREES
PHOSPHATE SERPL-MCNC: 3.7 MG/DL (ref 2.7–4.5)
PLATELET # BLD AUTO: 287 THOUSANDS/UL (ref 149–390)
PMV BLD AUTO: 9.7 FL (ref 8.9–12.7)
POTASSIUM SERPL-SCNC: 3.3 MMOL/L (ref 3.5–5.3)
POTASSIUM SERPL-SCNC: 4 MMOL/L (ref 3.5–5.3)
PR INTERVAL: 162 MS
PROT SERPL-MCNC: 6.5 G/DL (ref 6.4–8.4)
QRS AXIS: 39 DEGREES
QRSD INTERVAL: 82 MS
QT INTERVAL: 410 MS
QTC INTERVAL: 490 MS
RBC # BLD AUTO: 3.62 MILLION/UL (ref 3.88–5.62)
RETICS # AUTO: ABNORMAL 10*3/UL (ref 14356–105094)
RETICS # CALC: 2.62 % (ref 0.37–1.87)
SODIUM SERPL-SCNC: 133 MMOL/L (ref 135–147)
SODIUM SERPL-SCNC: 135 MMOL/L (ref 135–147)
T WAVE AXIS: 81 DEGREES
VENTRICULAR RATE: 86 BPM
WBC # BLD AUTO: 10 THOUSAND/UL (ref 4.31–10.16)

## 2024-06-22 PROCEDURE — 99232 SBSQ HOSP IP/OBS MODERATE 35: CPT | Performed by: INTERNAL MEDICINE

## 2024-06-22 PROCEDURE — 93010 ELECTROCARDIOGRAM REPORT: CPT | Performed by: INTERNAL MEDICINE

## 2024-06-22 PROCEDURE — 93005 ELECTROCARDIOGRAM TRACING: CPT

## 2024-06-22 PROCEDURE — 80053 COMPREHEN METABOLIC PANEL: CPT

## 2024-06-22 PROCEDURE — 82948 REAGENT STRIP/BLOOD GLUCOSE: CPT

## 2024-06-22 PROCEDURE — 85025 COMPLETE CBC W/AUTO DIFF WBC: CPT

## 2024-06-22 PROCEDURE — 85046 RETICYTE/HGB CONCENTRATE: CPT | Performed by: INTERNAL MEDICINE

## 2024-06-22 PROCEDURE — 84100 ASSAY OF PHOSPHORUS: CPT

## 2024-06-22 PROCEDURE — 80048 BASIC METABOLIC PNL TOTAL CA: CPT | Performed by: INTERNAL MEDICINE

## 2024-06-22 PROCEDURE — 83735 ASSAY OF MAGNESIUM: CPT

## 2024-06-22 RX ORDER — LANOLIN ALCOHOL/MO/W.PET/CERES
400 CREAM (GRAM) TOPICAL 2 TIMES DAILY
Status: DISCONTINUED | OUTPATIENT
Start: 2024-06-22 | End: 2024-06-26 | Stop reason: HOSPADM

## 2024-06-22 RX ORDER — MAGNESIUM HYDROXIDE/ALUMINUM HYDROXICE/SIMETHICONE 120; 1200; 1200 MG/30ML; MG/30ML; MG/30ML
30 SUSPENSION ORAL EVERY 4 HOURS PRN
Status: DISCONTINUED | OUTPATIENT
Start: 2024-06-22 | End: 2024-06-26 | Stop reason: HOSPADM

## 2024-06-22 RX ORDER — NITROGLYCERIN 0.4 MG/1
0.4 TABLET SUBLINGUAL
Status: DISCONTINUED | OUTPATIENT
Start: 2024-06-22 | End: 2024-06-26 | Stop reason: HOSPADM

## 2024-06-22 RX ORDER — POTASSIUM CHLORIDE 14.9 MG/ML
20 INJECTION INTRAVENOUS
Status: COMPLETED | OUTPATIENT
Start: 2024-06-22 | End: 2024-06-22

## 2024-06-22 RX ORDER — CALCIUM CARBONATE 500 MG/1
500 TABLET, CHEWABLE ORAL DAILY PRN
Status: DISCONTINUED | OUTPATIENT
Start: 2024-06-22 | End: 2024-06-26 | Stop reason: HOSPADM

## 2024-06-22 RX ADMIN — BUDESONIDE AND FORMOTEROL FUMARATE DIHYDRATE 2 PUFF: 160; 4.5 AEROSOL RESPIRATORY (INHALATION) at 08:27

## 2024-06-22 RX ADMIN — POTASSIUM CHLORIDE 40 MEQ: 1500 TABLET, EXTENDED RELEASE ORAL at 08:26

## 2024-06-22 RX ADMIN — POTASSIUM CHLORIDE 20 MEQ: 14.9 INJECTION, SOLUTION INTRAVENOUS at 04:36

## 2024-06-22 RX ADMIN — ATORVASTATIN CALCIUM 10 MG: 10 TABLET, FILM COATED ORAL at 08:27

## 2024-06-22 RX ADMIN — APIXABAN 5 MG: 5 TABLET, FILM COATED ORAL at 08:27

## 2024-06-22 RX ADMIN — BUDESONIDE AND FORMOTEROL FUMARATE DIHYDRATE 2 PUFF: 160; 4.5 AEROSOL RESPIRATORY (INHALATION) at 17:40

## 2024-06-22 RX ADMIN — PREGABALIN 50 MG: 50 CAPSULE ORAL at 21:16

## 2024-06-22 RX ADMIN — EMPAGLIFLOZIN 10 MG: 10 TABLET, FILM COATED ORAL at 08:28

## 2024-06-22 RX ADMIN — INSULIN LISPRO 1 UNITS: 100 INJECTION, SOLUTION INTRAVENOUS; SUBCUTANEOUS at 21:17

## 2024-06-22 RX ADMIN — IRON SUCROSE 300 MG: 20 INJECTION, SOLUTION INTRAVENOUS at 13:58

## 2024-06-22 RX ADMIN — CALCIUM CARBONATE (ANTACID) CHEW TAB 500 MG 500 MG: 500 CHEW TAB at 01:47

## 2024-06-22 RX ADMIN — UMECLIDINIUM 1 PUFF: 62.5 AEROSOL, POWDER ORAL at 08:28

## 2024-06-22 RX ADMIN — INSULIN LISPRO 5 UNITS: 100 INJECTION, SOLUTION INTRAVENOUS; SUBCUTANEOUS at 08:27

## 2024-06-22 RX ADMIN — POTASSIUM CHLORIDE 20 MEQ: 14.9 INJECTION, SOLUTION INTRAVENOUS at 06:39

## 2024-06-22 RX ADMIN — INSULIN LISPRO 5 UNITS: 100 INJECTION, SOLUTION INTRAVENOUS; SUBCUTANEOUS at 12:50

## 2024-06-22 RX ADMIN — BUMETANIDE 6 MG: 2 TABLET ORAL at 17:40

## 2024-06-22 RX ADMIN — MAGNESIUM OXIDE TAB 400 MG (241.3 MG ELEMENTAL MG) 400 MG: 400 (241.3 MG) TAB at 13:29

## 2024-06-22 RX ADMIN — INSULIN LISPRO 5 UNITS: 100 INJECTION, SOLUTION INTRAVENOUS; SUBCUTANEOUS at 17:40

## 2024-06-22 RX ADMIN — METOPROLOL SUCCINATE 50 MG: 50 TABLET, EXTENDED RELEASE ORAL at 08:27

## 2024-06-22 RX ADMIN — INSULIN GLARGINE 20 UNITS: 100 INJECTION, SOLUTION SUBCUTANEOUS at 21:16

## 2024-06-22 RX ADMIN — BUMETANIDE 6 MG: 2 TABLET ORAL at 08:27

## 2024-06-22 RX ADMIN — APIXABAN 5 MG: 5 TABLET, FILM COATED ORAL at 21:16

## 2024-06-22 NOTE — PLAN OF CARE
Problem: PAIN - ADULT  Goal: Verbalizes/displays adequate comfort level or baseline comfort level  Description: Interventions:  - Encourage patient to monitor pain and request assistance  - Assess pain using appropriate pain scale  - Administer analgesics based on type and severity of pain and evaluate response  - Implement non-pharmacological measures as appropriate and evaluate response  - Consider cultural and social influences on pain and pain management  - Notify physician/advanced practitioner if interventions unsuccessful or patient reports new pain  Outcome: Progressing     Problem: INFECTION - ADULT  Goal: Absence or prevention of progression during hospitalization  Description: INTERVENTIONS:  - Assess and monitor for signs and symptoms of infection  - Monitor lab/diagnostic results  - Monitor all insertion sites, i.e. indwelling lines, tubes, and drains  - Monitor endotracheal if appropriate and nasal secretions for changes in amount and color  - [place] appropriate cooling/warming therapies per order  - Administer medications as ordered  - Instruct and encourage patient and family to use good hand hygiene technique  - Identify and instruct in appropriate isolation precautions for identified infection/condition  Outcome: Progressing     Problem: SAFETY ADULT  Goal: Patient will remain free of falls  Description: INTERVENTIONS:  - Educate patient/family on patient safety including physical limitations  - Instruct patient to call for assistance with activity   - Consult OT/PT to assist with strengthening/mobility   - Keep Call bell within reach  - Keep bed low and locked with side rails adjusted as appropriate  - Keep care items and personal belongings within reach  - Initiate and maintain comfort rounds  - Make Fall Risk Sign visible to staff  - Offer Toileting every 2 Hours, in advance of need  - Initiate/Maintain bed alarm  - Obtain necessary fall risk management equipment  - Apply yellow socks and  bracelet for high fall risk patients  - Consider moving patient to room near nurses station  Outcome: Progressing

## 2024-06-22 NOTE — PLAN OF CARE
Problem: PAIN - ADULT  Goal: Verbalizes/displays adequate comfort level or baseline comfort level  Description: Interventions:  - Encourage patient to monitor pain and request assistance  - Assess pain using appropriate pain scale  - Administer analgesics based on type and severity of pain and evaluate response  - Implement non-pharmacological measures as appropriate and evaluate response  - Consider cultural and social influences on pain and pain management  - Notify physician/advanced practitioner if interventions unsuccessful or patient reports new pain  Outcome: Progressing     Problem: INFECTION - ADULT  Goal: Absence or prevention of progression during hospitalization  Description: INTERVENTIONS:  - Assess and monitor for signs and symptoms of infection  - Monitor lab/diagnostic results  - Monitor all insertion sites, i.e. indwelling lines, tubes, and drains  - Monitor endotracheal if appropriate and nasal secretions for changes in amount and color  - [place] appropriate cooling/warming therapies per order  - Administer medications as ordered  - Instruct and encourage patient and family to use good hand hygiene technique  - Identify and instruct in appropriate isolation precautions for identified infection/condition  Outcome: Progressing

## 2024-06-22 NOTE — ASSESSMENT & PLAN NOTE
Lab Results   Component Value Date    HGBA1C 8.3 (H) 06/19/2024       Recent Labs     06/21/24  1654 06/21/24  2113 06/22/24  0755 06/22/24  1203   POCGLU 167* 218* 153* 179*         Blood Sugar Average: Last 72 hrs:  (P) 223.8  Continue with lyrica nightly   Start Lantus for hyperglycemia

## 2024-06-22 NOTE — PROGRESS NOTES
[facility]  Progress Note  Name: Mesfin Cano I  MRN: [id number]  Unit/Bed#: -01 I Date of Admission: 6/18/2024   Date of Service: 6/22/2024 I Hospital Day: 2    Assessment & Plan   Moderate persistent asthma without complication  Assessment & Plan  Patient with a hx of asthma, had PFT 2021 showing severe reversible obstruction  He is currently using albuterol inh, symbicort and spiriva, he has had to increase albuterol use in the last 1 week  No increase in cough or sputum production, no upper respiratory sxs  Here is is on room air, not using accessory muscle. Lungs sound clear w/o wheezing       Hyperkalemia  Assessment & Plan  Resolved     Anemia due to chronic kidney disease  Assessment & Plan  Hemoglobin currently at baseline  Outpatient follow up, will treat with IV iron    Chronic diastolic CHF (HCC)  Assessment & Plan  Wt Readings from Last 3 Encounters:   06/22/24 (!) 149 kg (327 lb 12.8 oz)   05/24/24 (!) 150 kg (331 lb 9.6 oz)   02/20/24 (!) 151 kg (332 lb 12.8 oz)     Currently appears euvolemic  Daily weights  Home Bumex resumed we will monitor urinary output Daily weights and creatinine  Continue to hold Aldactone            Diabetic polyneuropathy associated with type 2 diabetes mellitus (HCC)  Assessment & Plan  Lab Results   Component Value Date    HGBA1C 8.3 (H) 06/19/2024       Recent Labs     06/21/24  1654 06/21/24  2113 06/22/24  0755 06/22/24  1203   POCGLU 167* 218* 153* 179*         Blood Sugar Average: Last 72 hrs:  (P) 223.8  Continue with lyrica nightly   Start Lantus for hyperglycemia    CKD (chronic kidney disease) stage 3 (HCC)  Assessment & Plan  Lab Results   Component Value Date    EGFR 45 06/22/2024    EGFR 41 06/22/2024    EGFR 38 06/21/2024    CREATININE 1.65 (H) 06/22/2024    CREATININE 1.78 (H) 06/22/2024    CREATININE 1.90 (H) 06/21/2024   Monitor Creatinine with resumption of diuretics    Hyponatremia  Assessment & Plan  Sodium  levels improved  Continue to monitor    Type 2 diabetes mellitus with hyperglycemia, without long-term current use of insulin (HCC)  Assessment & Plan  Lab Results   Component Value Date    HGBA1C 8.3 (H) 06/19/2024       Recent Labs     06/21/24  1654 06/21/24  2113 06/22/24  0755 06/22/24  1203   POCGLU 167* 218* 153* 179*         Blood Sugar Average: Last 72 hrs:  (P) 223.8    Monitor fingerstick glucose levels  Insulin sliding scale as needed, received 5u of regular insulin tonight  High dose ISS due to elevated blood glucose reasons likely due to steroid use      * Uncontrolled atrial flutter (HCC)  Assessment & Plan  Patient presented with 1 week history of shortness of breath and chest pain  On presentation to emergency room he was noted to have atrial flutter with heart rate in the 140s  He is status post electrical cardioversion in the ED with return to normal sinus rhythm  He admits to not taking his metoprolol the last 2 days due to feeling lightheaded/dizzy - suspect due to tachycardia induced hypotension worsened by BB  Place in observation unit  Cardiac telemetry  Follow-up TSH, troponins w/EKG  Echocardiogram   Continue with metoprolol 50 mg daily, first dose now  Continue with Eliquis 5 mg twice daily  Appreaciate cardiology input will continue on home beta-blocker and monitor on telemetry for further episodes of atrial flutter, if this occurs we will have discussion about ablation                 VTE Pharmacologic Prophylaxis:   Moderate Risk (Score 3-4) - Pharmacological DVT Prophylaxis Ordered: apixaban (Eliquis).    Mobility:   Basic Mobility Inpatient Raw Score: 22  JH-HLM Goal: 7: Walk 25 feet or more  JH-HLM Achieved: 7: Walk 25 feet or more  JH-HLM Goal achieved. Continue to encourage appropriate mobility.    Patient Centered Rounds: I performed bedside rounds with nursing staff today.   Discussions with Specialists or Other Care Team Provider:     Education and Discussions with Family /  Patient: Updated  (significant other) at bedside.    Total Time Spent on Date of Encounter in care of patient:  mins. This time was spent on one or more of the following: performing physical exam; counseling and coordination of care; obtaining or reviewing history; documenting in the medical record; reviewing/ordering tests, medications or procedures; communicating with other healthcare professionals and discussing with patient's family/caregivers.    Current Length of Stay: 2 day(s)  Current Patient Status: Inpatient   Certification Statement: The patient will continue to require additional inpatient hospital stay due to chest pain  Discharge Plan: Anticipate discharge in 48 hrs to home with home services.    Code Status: Level 1 - Full Code    Subjective:   Patient denies any acute complaints during my evaluation however overnight he did have recurrence of chest discomfort EKG was reviewed nonischemic, will monitor for recurrence, asked patient to ambulate around the hallway today and monitor for symptoms, continue to monitor on telemetry    Objective:     Vitals:   Temp (24hrs), Av.3 °F (36.8 °C), Min:97.5 °F (36.4 °C), Max:98.9 °F (37.2 °C)    Temp:  [97.5 °F (36.4 °C)-98.9 °F (37.2 °C)] 98.3 °F (36.8 °C)  HR:  [] 73  Resp:  [17-19] 17  BP: (121-138)/(66-75) 123/67  SpO2:  [92 %-99 %] 98 %  Body mass index is 43.25 kg/m².     Input and Output Summary (last 24 hours):     Intake/Output Summary (Last 24 hours) at 2024 1640  Last data filed at 2024 1539  Gross per 24 hour   Intake 1757 ml   Output 2800 ml   Net -1043 ml       Physical Exam:   Physical Exam     Additional Data:     Labs:  Results from last 7 days   Lab Units 24  0146   WBC Thousand/uL 10.00   HEMOGLOBIN g/dL 8.1*   HEMATOCRIT % 27.5*   PLATELETS Thousands/uL 287   SEGS PCT % 77*   LYMPHO PCT % 11*   MONO PCT % 8   EOS PCT % 2     Results from last 7 days   Lab Units 24  1328 24  0146   SODIUM  mmol/L 133* 135   POTASSIUM mmol/L 4.0 3.3*   CHLORIDE mmol/L 98 97   CO2 mmol/L 26 26   BUN mg/dL 47* 51*   CREATININE mg/dL 1.65* 1.78*   ANION GAP mmol/L 9 12   CALCIUM mg/dL 7.6* 7.8*   ALBUMIN g/dL  --  3.8   TOTAL BILIRUBIN mg/dL  --  0.68   ALK PHOS U/L  --  94   ALT U/L  --  8   AST U/L  --  11*   GLUCOSE RANDOM mg/dL 235* 198*         Results from last 7 days   Lab Units 24  1203 24  0755 24  2113 24  1654 24  1423 24  1216 24  1038 24  0749 24  2117 24  1652 24  1419 24  0853   POC GLUCOSE mg/dl 179* 153* 218* 167* 241* 180* 202* 134 153* 113 187* 277*     Results from last 7 days   Lab Units 24  0313   HEMOGLOBIN A1C % 8.3*           Lines/Drains:  Invasive Devices       Peripheral Intravenous Line  Duration             Peripheral IV 24 Left;Proximal;Ventral (anterior) Forearm 2 days                      Telemetry:  Telemetry Orders (From admission, onward)               24 Hour Telemetry Monitoring  Continuous x 24 Hours (Telem)           Question:  Reason for 24 Hour Telemetry  Answer:  PCI/EP study (including pacer and ICD implementation), Cardiac surgery, MI, abnormal cardiac cath, and chest pain- rule out MI                              Imaging: No pertinent imaging reviewed.    Recent Cultures (last 7 days):         Last 24 Hours Medication List:   Current Facility-Administered Medications   Medication Dose Route Frequency Provider Last Rate    acetaminophen  650 mg Oral Q6H PRN Nancy Polo MD      albuterol  2.5 mg Nebulization Q4H PRN Faheem Banda DO      aluminum-magnesium hydroxide-simethicone  30 mL Oral Q4H PRN RODRIGUE Morley      apixaban  5 mg Oral BID Nancy Polo MD      atorvastatin  10 mg Oral Daily Nancy Polo MD      budesonide-formoterol  2 puff Inhalation BID Nancy Polo MD      bumetanide  6 mg Oral BID Yamileth Mallory MD      calcium carbonate  500 mg Oral Daily  PRN RODRIGUE Morley      Empagliflozin  10 mg Oral Daily Nancy Polo MD      insulin glargine  20 Units Subcutaneous HS Faheem Banda DO      insulin lispro  1-6 Units Subcutaneous HS Nancy Polo MD      insulin lispro  5 Units Subcutaneous TID With Meals Faheem Banda DO      magnesium Oxide  400 mg Oral BID Faheem Banda DO      metoprolol succinate  50 mg Oral Daily Nancy Polo MD      nitroglycerin  0.4 mg Sublingual Q5 Min PRN Faheem Banda DO      pregabalin  50 mg Oral HS Nancy Polo MD      umeclidinium  1 puff Inhalation Daily Nancy Polo MD          Today, Patient Was Seen By: Faheem Banda DO    **Please Note: This note may have been constructed using a voice recognition system.**

## 2024-06-22 NOTE — PROGRESS NOTES
Progress Note - Cardiology   Mesfin Cano 59 y.o. male MRN: [id number]  Unit/Bed#: -01 Encounter: [id number]  06/22/24  11:22 AM      Impression:     59-year-old with a history of diabetes mellitus, hypertension, CKD with baseline creatinine around 2, heart failure with preserved ejection fraction-at baseline on Bumex 6 mg twice daily and metolazone as needed, baseline dry weight around 332 pounds, no alcohol use, negative coronary angiography-2022, presented with chest pain and shortness of breath, no palpitations, found to be in atrial flutter, underwent cardioversion after which symptoms resolved, troponins were essentially negative-in the setting of CKD.     He carries a prior history of atrial fibrillation and is on apixaban 5 mg twice daily, according to him had 1 episode towards the end of 2022 but on reviewing all ECGs on MUSE and media from 2022 onwards, I could not find an ECG with atrial fibrillation.     Of note, he missed his metoprolol for a couple of days prior to admission since he felt this was making him dizzy.  No alcohol use.    Given extra dose of furosemide 3 days ago with mild acute kidney injury and his diuretics were held for a day, resumed Bumex 6 twice daily yesterday.  Renal function remained stable, volume status is euvolemic and he is completely asymptomatic.    Has had chest pains last night and the night before with negative ECGs, and negative cardiac enzymes on the first occasion, no enzymes last night.  Very atypical-lasted 40 minutes, occurring at night, some improvement with Tums during the second episode and Tylenol during the first episode.  Sinus with PVCs on telemetry  Renal function is also stable, has a baseline creatinine up to     Plan:     Atrial flutter: New diagnosis-this is typical atrial flutter, if it recurs, should consider ablation as a curative strategy.  Normal TSH.  Missed his metoprolol for a couple of days prior, this might have increased catecholamine  response as well.  Has not recurred in the hospital     Chronic heart failure with preserved ejection fraction: At baseline on Bumex 6 twice daily and metolazone as needed with a baseline dry weight around 332 pounds.  Current weight remains below his baseline dry weight, 2, okay for discharge on his baseline dose of diuretics.  He did not have any acute exacerbation, missed a dose of his diuretic coming into the hospital  And hence needed to make up for it with a dose of furosemide.     Appears euvolemic   CardioMEMS in place and follows with heart failure as an outpatient     Chest pain episodes: Negative ECG, essentially negative enzymes on day 1, no enzymes last night but this is very atypical, no further evaluation, patient had negative coronary angiography in 2022, no further evaluation.  Sinus with PVCs on telemetry.     Hypertension: Controlled     Diabetes: Last A1c of 8.3, needs better control long-term.     Dyslipidemia: On low-dose atorvastatin, most recently current LDL of 86, non-HDL of 125, needs better control, should uptitrate to at least 40 mg     Okay for discharge from a cardiac standpoint      ===================================================================    Chief Complaint:   Chief Complaint   Patient presents with    Chest Pain     Pt [facility] with c/o midsternal chest pain radiating to R jaw intermittent all week. Hx a fib and CHF, given 1 nitro and ASA by EMS         Subjective/Objective     Subjective: Denies any complaints, feels well    Objective: No distress    Patient Active Problem List   Diagnosis    Irritable bowel syndrome with diarrhea    Primary hypertension    Type 2 diabetes mellitus with hyperglycemia, without long-term current use of insulin (HCC)    Primary osteoarthritis of both knees    Hyperlipidemia    Morbid obesity (HCC)    Vitamin B12 deficiency    Vitamin D deficiency    VICKY (obstructive sleep apnea)    Hyponatremia    Severe persistent asthma     "Paroxysmal atrial fibrillation (HCC)    HNP (herniated nucleus pulposus), lumbar    Foraminal stenosis of lumbar region    Primary osteoarthritis of right knee    CKD (chronic kidney disease) stage 3 (HCC)    Diabetic polyneuropathy associated with type 2 diabetes mellitus (HCC)    Chronic diastolic CHF (HCC)    Loose stools    Presence of CardioMEMS HF system    Nasal congestion    Anemia due to chronic kidney disease    Eosinophilia    Diabetic neuropathy (HCC)    Acute gallstone pancreatitis    Status post cholecystectomy    Uncontrolled atrial flutter (HCC)    Hyperkalemia    Moderate persistent asthma without complication       Vitals: /71 (BP Location: Right arm)   Pulse 83   Temp 98.2 °F (36.8 °C) (Oral)   Resp 19   Ht 6' 1\" (1.854 m)   Wt (!) 149 kg (327 lb 12.8 oz)   SpO2 92%   BMI 43.25 kg/m²     I/O this shift:  In: 780 [P.O.:780]  Out: 875 [Urine:875]  Wt Readings from Last 3 Encounters:   06/22/24 (!) 149 kg (327 lb 12.8 oz)   05/24/24 (!) 150 kg (331 lb 9.6 oz)   02/20/24 (!) 151 kg (332 lb 12.8 oz)       Intake/Output Summary (Last 24 hours) at 6/22/2024 1122  Last data filed at 6/22/2024 1117  Gross per 24 hour   Intake 2237 ml   Output 2800 ml   Net -563 ml     I/O last 3 completed shifts:  In: 2537 [P.O.:2437; IV Piggyback:100]  Out: 4650 [Urine:4650]    Invasive Devices       Peripheral Intravenous Line  Duration             Peripheral IV 06/19/24 Left;Proximal;Ventral (anterior) Forearm 2 days                      Physical Exam:  GEN: Mesfin Cano appears okay, alert and oriented x 3, pleasant and cooperative   HEENT: pupils equal, round, and reactive to light; extraocular muscles intact  NECK: supple, no carotid bruits or JVD  HEART: regular rhythm, normal S1 and S2, no murmur, no clicks, gallops or rubs   LUNGS: clear to auscultation bilaterally; no wheezes or rhonchi, no rales  ABDOMEN/GI: normal bowel sounds, soft, no tenderness, no distention  EXTREMITIES/Musculoskeltal: " peripheral pulses normal; no clubbing, cyanosis, no edema  NEURO: no focal motor findings   SKIN: normal without suspicious lesions on exposed skin              Lab Results:       Results from last 7 days   Lab Units 06/22/24  0146 06/21/24  0434 06/19/24  0359   WBC Thousand/uL 10.00 8.40 8.92   HEMOGLOBIN g/dL 8.1* 7.9* 8.7*   HEMATOCRIT % 27.5* 26.4* 29.5*   PLATELETS Thousands/uL 287 291 347         Results from last 7 days   Lab Units 06/22/24  0146 06/21/24  0434 06/20/24  0436   POTASSIUM mmol/L 3.3* 3.4* 3.5   CHLORIDE mmol/L 97 101 99   CO2 mmol/L 26 25 24   BUN mg/dL 51* 56* 63*   CREATININE mg/dL 1.78* 1.90* 2.21*   CALCIUM mg/dL 7.8* 7.6* 7.8*   ALK PHOS U/L 94  --   --    ALT U/L 8  --   --    AST U/L 11*  --   --          Imaging: I have personally reviewed pertinent reports.    EKG/Telemtry: No events    Scheduled Meds:  Current Facility-Administered Medications   Medication Dose Route Frequency Provider Last Rate    acetaminophen  650 mg Oral Q6H PRN Nancy Polo MD      albuterol  2.5 mg Nebulization Q4H PRN Faheem Banda DO      aluminum-magnesium hydroxide-simethicone  30 mL Oral Q4H PRN RODRIGUE Morley      apixaban  5 mg Oral BID Nancy Polo MD      atorvastatin  10 mg Oral Daily Nancy Polo MD      budesonide-formoterol  2 puff Inhalation BID Nancy Polo MD      bumetanide  6 mg Oral BID Yamileth Mallory MD      calcium carbonate  500 mg Oral Daily PRN RODRIGUE Morley      Empagliflozin  10 mg Oral Daily Nancy Polo MD      insulin glargine  20 Units Subcutaneous HS Faheem Banda DO      insulin lispro  1-6 Units Subcutaneous HS Nancy Polo MD      insulin lispro  5 Units Subcutaneous TID With Meals Faheem Banda DO      metoprolol succinate  50 mg Oral Daily Nancy Polo MD      pregabalin  50 mg Oral HS Nancy Polo MD      umeclidinium  1 puff Inhalation Daily Nancy Polo MD       Continuous Infusions:       VTE Pharmacologic  "Prophylaxis: Reason for no pharmacologic prophylaxis on apixaban    This note was completed in part utilizing [email or web address] direct voice recognition software.   Grammatical errors, random word insertion, spelling mistakes, occasional wrong word or \"sound-alike\" substitutions and incomplete sentences may be an occasional consequence of the system secondary to software limitations, ambient noise and hardware issues. At the time of dictation, efforts were made to edit, clarify and /or correct errors.  Please read the chart carefully and recognize, using context, where substitutions have occurred.  If you have any questions or concerns about the context, text or information contained within the body of this dictation, please contact myself, the provider, for further clarification.        "

## 2024-06-22 NOTE — ASSESSMENT & PLAN NOTE
Lab Results   Component Value Date    HGBA1C 8.3 (H) 06/19/2024       Recent Labs     06/21/24  1654 06/21/24  2113 06/22/24  0755 06/22/24  1203   POCGLU 167* 218* 153* 179*         Blood Sugar Average: Last 72 hrs:  (P) 223.8    Monitor fingerstick glucose levels  Insulin sliding scale as needed, received 5u of regular insulin tonight  High dose ISS due to elevated blood glucose reasons likely due to steroid use

## 2024-06-22 NOTE — QUICK NOTE
"Called by nurse regarding chest pain 3/10 on the numeric pain scale. Per RN the patient feels the more like\"discomfort\" than pain and it does not radiates to no where else in the body. EKG was obtained and shows sinus rhythm with frequent PVC's HR in the 80's. Blood work ordered and show K of 3.3, replacement ordered. While rounding on floor,  bradycardia was also reported, patient is asleep. Nurse was instructed to continue monitoring the patient closely.    "

## 2024-06-22 NOTE — ASSESSMENT & PLAN NOTE
Continued Stay Note  [facility]     Patient Name: Diego Brizuela  MRN: [id number]  Today's Date: 3/10/2022    Admit Date: 3/7/2022     Discharge Plan     Row Name 03/10/22 1312       Plan    Plan [facility] psych unit    Patient/Family in Agreement with Plan yes    Final Discharge Disposition Code 65 - psychiatric hospital or unit    Final Note Pt is being discharged to [facility] unit today, Iraida called saying they would accept this patient today.  Informed wife and she is very happy about this.  She has no preference for ambulance so can try with [facility] ambulance:[phone] .  Report to be called to them at [phone].  Fax number: [phone].    Row Name 03/10/22 1052       Plan    Plan [facility] psych unit    Patient/Family in Agreement with Plan yes    Final Discharge Disposition Code 65 - psychiatric hospital or unit    Final Note Pt has been accepted at [facility] psych unit per Iraida there.  Informed Maxine Brizuela Spouse [phone] that is very happy about this.  Pt will need to travel via ambulance, she has no preference so can ask [facility] if they can provide service at number [phone].  Report will need give at [phone].  This  will send d/c summary, their fax is               Discharge Codes    No documentation.               Expected Discharge Date and Time     Expected Discharge Date Expected Discharge Time    Mar 10, 2022             TASIA Butts     Sodium levels improved  Continue to monitor

## 2024-06-22 NOTE — ASSESSMENT & PLAN NOTE
Lab Results   Component Value Date    EGFR 45 06/22/2024    EGFR 41 06/22/2024    EGFR 38 06/21/2024    CREATININE 1.65 (H) 06/22/2024    CREATININE 1.78 (H) 06/22/2024    CREATININE 1.90 (H) 06/21/2024   Monitor Creatinine with resumption of diuretics

## 2024-06-22 NOTE — ASSESSMENT & PLAN NOTE
Wt Readings from Last 3 Encounters:   06/22/24 (!) 149 kg (327 lb 12.8 oz)   05/24/24 (!) 150 kg (331 lb 9.6 oz)   02/20/24 (!) 151 kg (332 lb 12.8 oz)     Currently appears euvolemic  Daily weights  Home Bumex resumed we will monitor urinary output Daily weights and creatinine  Continue to hold Aldactone

## 2024-06-23 LAB
ANION GAP SERPL CALCULATED.3IONS-SCNC: 11 MMOL/L (ref 4–13)
BUN SERPL-MCNC: 42 MG/DL (ref 5–25)
CALCIUM SERPL-MCNC: 7.2 MG/DL (ref 8.4–10.2)
CHLORIDE SERPL-SCNC: 101 MMOL/L (ref 96–108)
CO2 SERPL-SCNC: 26 MMOL/L (ref 21–32)
CREAT SERPL-MCNC: 1.58 MG/DL (ref 0.6–1.3)
GFR SERPL CREATININE-BSD FRML MDRD: 47 ML/MIN/1.73SQ M
GLUCOSE SERPL-MCNC: 135 MG/DL (ref 65–140)
GLUCOSE SERPL-MCNC: 143 MG/DL (ref 65–140)
GLUCOSE SERPL-MCNC: 151 MG/DL (ref 65–140)
GLUCOSE SERPL-MCNC: 181 MG/DL (ref 65–140)
GLUCOSE SERPL-MCNC: 194 MG/DL (ref 65–140)
POTASSIUM SERPL-SCNC: 3.5 MMOL/L (ref 3.5–5.3)
SODIUM SERPL-SCNC: 138 MMOL/L (ref 135–147)

## 2024-06-23 PROCEDURE — 99232 SBSQ HOSP IP/OBS MODERATE 35: CPT | Performed by: INTERNAL MEDICINE

## 2024-06-23 PROCEDURE — 82948 REAGENT STRIP/BLOOD GLUCOSE: CPT

## 2024-06-23 PROCEDURE — 80048 BASIC METABOLIC PNL TOTAL CA: CPT | Performed by: INTERNAL MEDICINE

## 2024-06-23 PROCEDURE — 94664 DEMO&/EVAL PT USE INHALER: CPT

## 2024-06-23 RX ORDER — SPIRONOLACTONE 25 MG/1
25 TABLET ORAL DAILY
Status: DISCONTINUED | OUTPATIENT
Start: 2024-06-23 | End: 2024-06-26 | Stop reason: HOSPADM

## 2024-06-23 RX ORDER — ALBUTEROL SULFATE 90 UG/1
2 AEROSOL, METERED RESPIRATORY (INHALATION) EVERY 4 HOURS PRN
Status: DISCONTINUED | OUTPATIENT
Start: 2024-06-23 | End: 2024-06-26 | Stop reason: HOSPADM

## 2024-06-23 RX ADMIN — UMECLIDINIUM 1 PUFF: 62.5 AEROSOL, POWDER ORAL at 08:10

## 2024-06-23 RX ADMIN — APIXABAN 5 MG: 5 TABLET, FILM COATED ORAL at 21:27

## 2024-06-23 RX ADMIN — APIXABAN 5 MG: 5 TABLET, FILM COATED ORAL at 08:09

## 2024-06-23 RX ADMIN — BUMETANIDE 6 MG: 2 TABLET ORAL at 17:48

## 2024-06-23 RX ADMIN — MAGNESIUM OXIDE TAB 400 MG (241.3 MG ELEMENTAL MG) 400 MG: 400 (241.3 MG) TAB at 08:09

## 2024-06-23 RX ADMIN — PREGABALIN 50 MG: 50 CAPSULE ORAL at 21:27

## 2024-06-23 RX ADMIN — BUMETANIDE 6 MG: 2 TABLET ORAL at 08:09

## 2024-06-23 RX ADMIN — MAGNESIUM OXIDE TAB 400 MG (241.3 MG ELEMENTAL MG) 400 MG: 400 (241.3 MG) TAB at 17:48

## 2024-06-23 RX ADMIN — BUDESONIDE AND FORMOTEROL FUMARATE DIHYDRATE 2 PUFF: 160; 4.5 AEROSOL RESPIRATORY (INHALATION) at 17:48

## 2024-06-23 RX ADMIN — INSULIN GLARGINE 20 UNITS: 100 INJECTION, SOLUTION SUBCUTANEOUS at 21:27

## 2024-06-23 RX ADMIN — BUDESONIDE AND FORMOTEROL FUMARATE DIHYDRATE 2 PUFF: 160; 4.5 AEROSOL RESPIRATORY (INHALATION) at 08:10

## 2024-06-23 RX ADMIN — IRON SUCROSE 300 MG: 20 INJECTION, SOLUTION INTRAVENOUS at 13:49

## 2024-06-23 RX ADMIN — INSULIN LISPRO 5 UNITS: 100 INJECTION, SOLUTION INTRAVENOUS; SUBCUTANEOUS at 12:34

## 2024-06-23 RX ADMIN — INSULIN LISPRO 2 UNITS: 100 INJECTION, SOLUTION INTRAVENOUS; SUBCUTANEOUS at 21:28

## 2024-06-23 RX ADMIN — INSULIN LISPRO 5 UNITS: 100 INJECTION, SOLUTION INTRAVENOUS; SUBCUTANEOUS at 08:09

## 2024-06-23 RX ADMIN — EMPAGLIFLOZIN 10 MG: 10 TABLET, FILM COATED ORAL at 08:10

## 2024-06-23 RX ADMIN — SPIRONOLACTONE 25 MG: 25 TABLET ORAL at 15:31

## 2024-06-23 RX ADMIN — INSULIN LISPRO 5 UNITS: 100 INJECTION, SOLUTION INTRAVENOUS; SUBCUTANEOUS at 17:48

## 2024-06-23 RX ADMIN — ATORVASTATIN CALCIUM 10 MG: 10 TABLET, FILM COATED ORAL at 08:09

## 2024-06-23 NOTE — ASSESSMENT & PLAN NOTE
Lab Results   Component Value Date    EGFR 47 06/23/2024    EGFR 45 06/22/2024    EGFR 41 06/22/2024    CREATININE 1.58 (H) 06/23/2024    CREATININE 1.65 (H) 06/22/2024    CREATININE 1.78 (H) 06/22/2024   Monitor Creatinine with resumption of diuretics

## 2024-06-23 NOTE — PLAN OF CARE
Problem: PAIN - ADULT  Goal: Verbalizes/displays adequate comfort level or baseline comfort level  Description: Interventions:  - Encourage patient to monitor pain and request assistance  - Assess pain using appropriate pain scale  - Administer analgesics based on type and severity of pain and evaluate response  - Implement non-pharmacological measures as appropriate and evaluate response  - Consider cultural and social influences on pain and pain management  - Notify physician/advanced practitioner if interventions unsuccessful or patient reports new pain  Outcome: Progressing     Problem: INFECTION - ADULT  Goal: Absence or prevention of progression during hospitalization  Description: INTERVENTIONS:  - Assess and monitor for signs and symptoms of infection  - Monitor lab/diagnostic results  - Monitor all insertion sites, i.e. indwelling lines, tubes, and drains  - Monitor endotracheal if appropriate and nasal secretions for changes in amount and color  - [place] appropriate cooling/warming therapies per order  - Administer medications as ordered  - Instruct and encourage patient and family to use good hand hygiene technique  - Identify and instruct in appropriate isolation precautions for identified infection/condition  Outcome: Progressing  Goal: Absence of fever/infection during neutropenic period  Description: INTERVENTIONS:  - Monitor WBC    Outcome: Progressing     Problem: SAFETY ADULT  Goal: Patient will remain free of falls  Description: INTERVENTIONS:  - Educate patient/family on patient safety including physical limitations  - Instruct patient to call for assistance with activity   - Consult OT/PT to assist with strengthening/mobility   - Keep Call bell within reach  - Keep bed low and locked with side rails adjusted as appropriate  - Keep care items and personal belongings within reach  - Initiate and maintain comfort rounds  - Make Fall Risk Sign visible to staff  - Offer Toileting every 2 Hours,  in advance of need  - Initiate/Maintain 2alarm  - Obtain necessary fall risk management equipment: 2  - Apply yellow socks and bracelet for high fall risk patients  - Consider moving patient to room near nurses station  Outcome: Progressing  Goal: Maintain or return to baseline ADL function  Description: INTERVENTIONS:  -  Assess patient's ability to carry out ADLs; assess patient's baseline for ADL function and identify physical deficits which impact ability to perform ADLs (bathing, care of mouth/teeth, toileting, grooming, dressing, etc.)  - Assess/evaluate cause of self-care deficits   - Assess range of motion  - Assess patient's mobility; develop plan if impaired  - Assess patient's need for assistive devices and provide as appropriate  - Encourage maximum independence but intervene and supervise when necessary  - Involve family in performance of ADLs  - Assess for home care needs following discharge   - Consider OT consult to assist with ADL evaluation and planning for discharge  - Provide patient education as appropriate  Outcome: Progressing  Goal: Maintains/Returns to pre admission functional level  Description: INTERVENTIONS:  - Perform AM-PAC 6 Click Basic Mobility/ Daily Activity assessment daily.  - Set and communicate daily mobility goal to care team and patient/family/caregiver.   - Collaborate with rehabilitation services on mobility goals if consulted  - Perform Range of Motion 2 times a day.  - Reposition patient every 2 hours.  - Dangle patient 2 times a day  - Stand patient 2 times a day  - Ambulate patient 2 times a day  - Out of bed to chair 2 times a day   - Out of bed for meals 2 times a day  - Out of bed for toileting  - Record patient progress and toleration of activity level   Outcome: Progressing     Problem: DISCHARGE PLANNING  Goal: Discharge to home or other facility with appropriate resources  Description: INTERVENTIONS:  - Identify barriers to discharge w/patient and caregiver  -  Arrange for needed discharge resources and transportation as appropriate  - Identify discharge learning needs (meds, wound care, etc.)  - Arrange for interpretive services to assist at discharge as needed  - Refer to Case Management Department for coordinating discharge planning if the patient needs post-hospital services based on physician/advanced practitioner order or complex needs related to functional status, cognitive ability, or social support system  Outcome: Progressing     Problem: Knowledge Deficit  Goal: Patient/family/caregiver demonstrates understanding of disease process, treatment plan, medications, and discharge instructions  Description: Complete learning assessment and assess knowledge base.  Interventions:  - Provide teaching at level of understanding  - Provide teaching via preferred learning methods  Outcome: Progressing

## 2024-06-23 NOTE — PROGRESS NOTES
[facility]  Progress Note  Name: Mesfin Cano I  MRN: [id number]  Unit/Bed#: -01 I Date of Admission: 6/18/2024   Date of Service: 6/23/2024 I Hospital Day: 3    Assessment & Plan   Moderate persistent asthma without complication  Assessment & Plan  Patient with a hx of asthma, had PFT 2021 showing severe reversible obstruction  He is currently using albuterol inh, symbicort and spiriva, he has had to increase albuterol use in the last 1 week  No increase in cough or sputum production, no upper respiratory sxs  Here is is on room air, not using accessory muscle. Lungs sound clear w/o wheezing       Hyperkalemia  Assessment & Plan  Resolved     Anemia due to chronic kidney disease  Assessment & Plan  Hemoglobin currently at baseline  Outpatient follow up, will treat with IV iron    Chronic diastolic CHF (HCC)  Assessment & Plan  Wt Readings from Last 3 Encounters:   06/23/24 (!) 148 kg (326 lb 6.4 oz)   05/24/24 (!) 150 kg (331 lb 9.6 oz)   02/20/24 (!) 151 kg (332 lb 12.8 oz)     Currently appears euvolemic  Daily weights  Home Bumex resumed we will monitor urinary output Daily weights and creatinine  We will resume Aldactone            Diabetic polyneuropathy associated with type 2 diabetes mellitus (HCC)  Assessment & Plan  Lab Results   Component Value Date    HGBA1C 8.3 (H) 06/19/2024       Recent Labs     06/22/24  1657 06/22/24  2109 06/23/24  0755 06/23/24  1156   POCGLU 150* 181* 143* 181*         Blood Sugar Average: Last 72 hrs:  (P) 177.6688375571078418  Continue with lyrica nightly   Start Lantus for hyperglycemia    CKD (chronic kidney disease) stage 3 (HCC)  Assessment & Plan  Lab Results   Component Value Date    EGFR 47 06/23/2024    EGFR 45 06/22/2024    EGFR 41 06/22/2024    CREATININE 1.58 (H) 06/23/2024    CREATININE 1.65 (H) 06/22/2024    CREATININE 1.78 (H) 06/22/2024   Monitor Creatinine with resumption of diuretics    Hyponatremia  Assessment &  Plan  Sodium levels improved  Continue to monitor    Type 2 diabetes mellitus with hyperglycemia, without long-term current use of insulin (HCC)  Assessment & Plan  Lab Results   Component Value Date    HGBA1C 8.3 (H) 06/19/2024       Recent Labs     06/22/24  1657 06/22/24  2109 06/23/24  0755 06/23/24  1156   POCGLU 150* 181* 143* 181*         Blood Sugar Average: Last 72 hrs:  (P) 177.2094670871380196    Monitor fingerstick glucose levels  Insulin sliding scale as needed, received 5u of regular insulin tonight  High dose ISS due to elevated blood glucose reasons likely due to steroid use      * Uncontrolled atrial flutter (HCC)  Assessment & Plan  Patient presented with 1 week history of shortness of breath and chest pain  On presentation to emergency room he was noted to have atrial flutter with heart rate in the 140s  He is status post electrical cardioversion in the ED with return to normal sinus rhythm  He admits to not taking his metoprolol the last 2 days due to feeling lightheaded/dizzy - suspect due to tachycardia induced hypotension worsened by BB  Place in observation unit  Cardiac telemetry  Follow-up TSH, troponins w/EKG  Echocardiogram   Continue with metoprolol 50 mg daily, first dose now  Continue with Eliquis 5 mg twice daily                   VTE Pharmacologic Prophylaxis:   Moderate Risk (Score 3-4) - Pharmacological DVT Prophylaxis Ordered: apixaban (Eliquis).    Mobility:   Basic Mobility Inpatient Raw Score: 22  JH-HLM Goal: 7: Walk 25 feet or more  JH-HLM Achieved: 7: Walk 25 feet or more  JH-HLM Goal achieved. Continue to encourage appropriate mobility.    Patient Centered Rounds: I performed bedside rounds with nursing staff today.   Discussions with Specialists or Other Care Team Provider:     Education and Discussions with Family / Patient: Patient declined call to .     Total Time Spent on Date of Encounter in care of patient:  mins. This time was spent on one or more of  the following: performing physical exam; counseling and coordination of care; obtaining or reviewing history; documenting in the medical record; reviewing/ordering tests, medications or procedures; communicating with other healthcare professionals and discussing with patient's family/caregivers.    Current Length of Stay: 3 day(s)  Current Patient Status: Inpatient   Certification Statement: The patient will continue to require additional inpatient hospital stay due to dc tomorrow  Discharge Plan: Anticipate discharge in 24-48 hrs to home.    Code Status: Level 1 - Full Code    Subjective:   Patient denies any chest discomfort over the past 24 hours    Objective:     Vitals:   Temp (24hrs), Av.2 °F (36.8 °C), Min:97.4 °F (36.3 °C), Max:98.6 °F (37 °C)    Temp:  [97.4 °F (36.3 °C)-98.6 °F (37 °C)] 98.6 °F (37 °C)  HR:  [73-91] 91  Resp:  [16-18] 16  BP: ()/(53-71) 117/68  SpO2:  [77 %-98 %] 98 %  Body mass index is 43.06 kg/m².     Input and Output Summary (last 24 hours):     Intake/Output Summary (Last 24 hours) at 2024 1522  Last data filed at 2024 1411  Gross per 24 hour   Intake 2062 ml   Output 1625 ml   Net 437 ml       Physical Exam:   Physical Exam  Vitals and nursing note reviewed.   Constitutional:       General: He is not in acute distress.     Appearance: He is well-developed. He is not toxic-appearing or diaphoretic.   HENT:      Head: Normocephalic and atraumatic.   Eyes:      General: No scleral icterus.     Conjunctiva/sclera: Conjunctivae normal.   Cardiovascular:      Rate and Rhythm: Normal rate and regular rhythm.      Heart sounds: No murmur heard.     No friction rub. No gallop.   Pulmonary:      Effort: Pulmonary effort is normal. No respiratory distress.      Breath sounds: Normal breath sounds. No stridor. No wheezing, rhonchi or rales.   Chest:      Chest wall: No tenderness.   Abdominal:      General: There is no distension.      Palpations: Abdomen is soft. There is no  mass.      Tenderness: There is no abdominal tenderness. There is no guarding or rebound.      Hernia: No hernia is present.   Musculoskeletal:         General: No swelling or tenderness.      Cervical back: Neck supple.   Skin:     General: Skin is warm and dry.      Capillary Refill: Capillary refill takes less than 2 seconds.   Neurological:      Mental Status: He is alert and oriented to person, place, and time.   Psychiatric:         Mood and Affect: Mood normal.          Additional Data:     Labs:  Results from last 7 days   Lab Units 06/22/24  0146   WBC Thousand/uL 10.00   HEMOGLOBIN g/dL 8.1*   HEMATOCRIT % 27.5*   PLATELETS Thousands/uL 287   SEGS PCT % 77*   LYMPHO PCT % 11*   MONO PCT % 8   EOS PCT % 2     Results from last 7 days   Lab Units 06/23/24  0556 06/22/24  1328 06/22/24  0146   SODIUM mmol/L 138   < > 135   POTASSIUM mmol/L 3.5   < > 3.3*   CHLORIDE mmol/L 101   < > 97   CO2 mmol/L 26   < > 26   BUN mg/dL 42*   < > 51*   CREATININE mg/dL 1.58*   < > 1.78*   ANION GAP mmol/L 11   < > 12   CALCIUM mg/dL 7.2*   < > 7.8*   ALBUMIN g/dL  --   --  3.8   TOTAL BILIRUBIN mg/dL  --   --  0.68   ALK PHOS U/L  --   --  94   ALT U/L  --   --  8   AST U/L  --   --  11*   GLUCOSE RANDOM mg/dL 135   < > 198*    < > = values in this interval not displayed.         Results from last 7 days   Lab Units 06/23/24  1156 06/23/24  0755 06/22/24  2109 06/22/24  1657 06/22/24  1203 06/22/24  0755 06/21/24  2113 06/21/24  1654 06/21/24  1423 06/21/24  1216 06/21/24  1038 06/21/24  0749   POC GLUCOSE mg/dl 181* 143* 181* 150* 179* 153* 218* 167* 241* 180* 202* 134     Results from last 7 days   Lab Units 06/19/24  0313   HEMOGLOBIN A1C % 8.3*           Lines/Drains:  Invasive Devices       Peripheral Intravenous Line  Duration             Peripheral IV 06/23/24 Left;Proximal;Ventral (anterior) Forearm <1 day                      Telemetry:  Telemetry Orders (From admission, onward)               24 Hour Telemetry  Monitoring  Continuous x 24 Hours (Telem)        Question:  Reason for 24 Hour Telemetry  Answer:  PCI/EP study (including pacer and ICD implementation), Cardiac surgery, MI, abnormal cardiac cath, and chest pain- rule out MI                                Imaging: No pertinent imaging reviewed.    Recent Cultures (last 7 days):         Last 24 Hours Medication List:   Current Facility-Administered Medications   Medication Dose Route Frequency Provider Last Rate    acetaminophen  650 mg Oral Q6H PRN Nancy Polo MD      albuterol  2.5 mg Nebulization Q4H PRN Faheem Banda DO      aluminum-magnesium hydroxide-simethicone  30 mL Oral Q4H PRN RODRIGUE Morley      apixaban  5 mg Oral BID Nancy Polo MD      atorvastatin  10 mg Oral Daily Nancy Polo MD      budesonide-formoterol  2 puff Inhalation BID Nancy Polo MD      bumetanide  6 mg Oral BID Yamileth Mallory MD      calcium carbonate  500 mg Oral Daily PRN RODRIGUE Morley      Empagliflozin  10 mg Oral Daily Nancy Polo MD      insulin glargine  20 Units Subcutaneous HS Faheem Banda,       insulin lispro  1-6 Units Subcutaneous HS Nancy Polo MD      insulin lispro  5 Units Subcutaneous TID With Meals Faheem Banda,       magnesium Oxide  400 mg Oral BID Faheem Banda DO      metoprolol succinate  50 mg Oral Daily Nancy Polo MD      nitroglycerin  0.4 mg Sublingual Q5 Min PRN Faheem Banda DO      pregabalin  50 mg Oral HS Nancy Polo MD      spironolactone  25 mg Oral Daily Faheem Banda DO      umeclidinium  1 puff Inhalation Daily Nancy Polo MD          Today, Patient Was Seen By: Faheem Banda DO    **Please Note: This note may have been constructed using a voice recognition system.**

## 2024-06-23 NOTE — ASSESSMENT & PLAN NOTE
Wt Readings from Last 3 Encounters:   06/23/24 (!) 148 kg (326 lb 6.4 oz)   05/24/24 (!) 150 kg (331 lb 9.6 oz)   02/20/24 (!) 151 kg (332 lb 12.8 oz)     Currently appears euvolemic  Daily weights  Home Bumex resumed we will monitor urinary output Daily weights and creatinine  We will resume Aldactone

## 2024-06-23 NOTE — ASSESSMENT & PLAN NOTE
Lab Results   Component Value Date    HGBA1C 8.3 (H) 06/19/2024       Recent Labs     06/22/24  1657 06/22/24  2109 06/23/24  0755 06/23/24  1156   POCGLU 150* 181* 143* 181*         Blood Sugar Average: Last 72 hrs:  (P) 177.4383198719594194  Continue with lyrica nightly   Start Lantus for hyperglycemia

## 2024-06-23 NOTE — ASSESSMENT & PLAN NOTE
Lab Results   Component Value Date    HGBA1C 8.3 (H) 06/19/2024       Recent Labs     06/22/24  1657 06/22/24  2109 06/23/24  0755 06/23/24  1156   POCGLU 150* 181* 143* 181*         Blood Sugar Average: Last 72 hrs:  (P) 177.1465369663350606    Monitor fingerstick glucose levels  Insulin sliding scale as needed, received 5u of regular insulin tonight  High dose ISS due to elevated blood glucose reasons likely due to steroid use

## 2024-06-23 NOTE — PLAN OF CARE
Problem: PAIN - ADULT  Goal: Verbalizes/displays adequate comfort level or baseline comfort level  Description: Interventions:  - Encourage patient to monitor pain and request assistance  - Assess pain using appropriate pain scale  - Administer analgesics based on type and severity of pain and evaluate response  - Implement non-pharmacological measures as appropriate and evaluate response  - Consider cultural and social influences on pain and pain management  - Notify physician/advanced practitioner if interventions unsuccessful or patient reports new pain  Outcome: Progressing     Problem: INFECTION - ADULT  Goal: Absence or prevention of progression during hospitalization  Description: INTERVENTIONS:  - Assess and monitor for signs and symptoms of infection  - Monitor lab/diagnostic results  - Monitor all insertion sites, i.e. indwelling lines, tubes, and drains  - Monitor endotracheal if appropriate and nasal secretions for changes in amount and color  - [place] appropriate cooling/warming therapies per order  - Administer medications as ordered  - Instruct and encourage patient and family to use good hand hygiene technique  - Identify and instruct in appropriate isolation precautions for identified infection/condition  Outcome: Progressing  Goal: Absence of fever/infection during neutropenic period  Description: INTERVENTIONS:  - Monitor WBC    Outcome: Progressing     Problem: SAFETY ADULT  Goal: Patient will remain free of falls  Description: INTERVENTIONS:  - Educate patient/family on patient safety including physical limitations  - Instruct patient to call for assistance with activity   - Consult OT/PT to assist with strengthening/mobility   - Keep Call bell within reach  - Keep bed low and locked with side rails adjusted as appropriate  - Keep care items and personal belongings within reach  - Initiate and maintain comfort rounds  - Make Fall Risk Sign visible to staff  - Offer Toileting every 2 Hours,  in advance of need  - Initiate/Maintain 2alarm  - Obtain necessary fall risk management equipment: 2  - Apply yellow socks and bracelet for high fall risk patients  - Consider moving patient to room near nurses station  Outcome: Progressing  Goal: Maintain or return to baseline ADL function  Description: INTERVENTIONS:  -  Assess patient's ability to carry out ADLs; assess patient's baseline for ADL function and identify physical deficits which impact ability to perform ADLs (bathing, care of mouth/teeth, toileting, grooming, dressing, etc.)  - Assess/evaluate cause of self-care deficits   - Assess range of motion  - Assess patient's mobility; develop plan if impaired  - Assess patient's need for assistive devices and provide as appropriate  - Encourage maximum independence but intervene and supervise when necessary  - Involve family in performance of ADLs  - Assess for home care needs following discharge   - Consider OT consult to assist with ADL evaluation and planning for discharge  - Provide patient education as appropriate  Outcome: Progressing  Goal: Maintains/Returns to pre admission functional level  Description: INTERVENTIONS:  - Perform AM-PAC 6 Click Basic Mobility/ Daily Activity assessment daily.  - Set and communicate daily mobility goal to care team and patient/family/caregiver.   - Collaborate with rehabilitation services on mobility goals if consulted  - Perform Range of Motion 2 times a day.  - Reposition patient every 2 hours.  - Dangle patient 2 times a day  - Stand patient 2 times a day  - Ambulate patient 2 times a day  - Out of bed to chair 2 times a day   - Out of bed for meals 2 times a day  - Out of bed for toileting  - Record patient progress and toleration of activity level   Outcome: Progressing     Problem: DISCHARGE PLANNING  Goal: Discharge to home or other facility with appropriate resources  Description: INTERVENTIONS:  - Identify barriers to discharge w/patient and caregiver  -  Arrange for needed discharge resources and transportation as appropriate  - Identify discharge learning needs (meds, wound care, etc.)  - Arrange for interpretive services to assist at discharge as needed  - Refer to Case Management Department for coordinating discharge planning if the patient needs post-hospital services based on physician/advanced practitioner order or complex needs related to functional status, cognitive ability, or social support system  Outcome: Progressing     Problem: Knowledge Deficit  Goal: Patient/family/caregiver demonstrates understanding of disease process, treatment plan, medications, and discharge instructions  Description: Complete learning assessment and assess knowledge base.  Interventions:  - Provide teaching at level of understanding  - Provide teaching via preferred learning methods  Outcome: Progressing

## 2024-06-23 NOTE — PLAN OF CARE
Problem: PAIN - ADULT  Goal: Verbalizes/displays adequate comfort level or baseline comfort level  Description: Interventions:  - Encourage patient to monitor pain and request assistance  - Assess pain using appropriate pain scale  - Administer analgesics based on type and severity of pain and evaluate response  - Implement non-pharmacological measures as appropriate and evaluate response  - Consider cultural and social influences on pain and pain management  - Notify physician/advanced practitioner if interventions unsuccessful or patient reports new pain  Outcome: Progressing     Problem: INFECTION - ADULT  Goal: Absence or prevention of progression during hospitalization  Description: INTERVENTIONS:  - Assess and monitor for signs and symptoms of infection  - Monitor lab/diagnostic results  - Monitor all insertion sites, i.e. indwelling lines, tubes, and drains  - Monitor endotracheal if appropriate and nasal secretions for changes in amount and color  - [place] appropriate cooling/warming therapies per order  - Administer medications as ordered  - Instruct and encourage patient and family to use good hand hygiene technique  - Identify and instruct in appropriate isolation precautions for identified infection/condition  Outcome: Progressing

## 2024-06-23 NOTE — ASSESSMENT & PLAN NOTE
Patient presented with 1 week history of shortness of breath and chest pain  On presentation to emergency room he was noted to have atrial flutter with heart rate in the 140s  He is status post electrical cardioversion in the ED with return to normal sinus rhythm  He admits to not taking his metoprolol the last 2 days due to feeling lightheaded/dizzy - suspect due to tachycardia induced hypotension worsened by BB  Place in observation unit  Cardiac telemetry  Follow-up TSH, troponins w/EKG  Echocardiogram   Continue with metoprolol 50 mg daily, first dose now  Continue with Eliquis 5 mg twice daily

## 2024-06-24 ENCOUNTER — TELEPHONE (OUTPATIENT)
Dept: CARDIOLOGY CLINIC | Facility: CLINIC | Age: 59
End: 2024-06-24

## 2024-06-24 LAB
ANION GAP SERPL CALCULATED.3IONS-SCNC: 13 MMOL/L (ref 4–13)
BUN SERPL-MCNC: 36 MG/DL (ref 5–25)
CALCIUM SERPL-MCNC: 7 MG/DL (ref 8.4–10.2)
CHLORIDE SERPL-SCNC: 98 MMOL/L (ref 96–108)
CO2 SERPL-SCNC: 26 MMOL/L (ref 21–32)
CREAT SERPL-MCNC: 1.76 MG/DL (ref 0.6–1.3)
GFR SERPL CREATININE-BSD FRML MDRD: 41 ML/MIN/1.73SQ M
GLUCOSE SERPL-MCNC: 118 MG/DL (ref 65–140)
GLUCOSE SERPL-MCNC: 145 MG/DL (ref 65–140)
GLUCOSE SERPL-MCNC: 156 MG/DL (ref 65–140)
GLUCOSE SERPL-MCNC: 205 MG/DL (ref 65–140)
GLUCOSE SERPL-MCNC: 212 MG/DL (ref 65–140)
POTASSIUM SERPL-SCNC: 3.4 MMOL/L (ref 3.5–5.3)
SODIUM SERPL-SCNC: 137 MMOL/L (ref 135–147)

## 2024-06-24 PROCEDURE — 82948 REAGENT STRIP/BLOOD GLUCOSE: CPT

## 2024-06-24 PROCEDURE — 99232 SBSQ HOSP IP/OBS MODERATE 35: CPT | Performed by: INTERNAL MEDICINE

## 2024-06-24 PROCEDURE — 80048 BASIC METABOLIC PNL TOTAL CA: CPT | Performed by: INTERNAL MEDICINE

## 2024-06-24 RX ORDER — POTASSIUM CHLORIDE 20 MEQ/1
40 TABLET, EXTENDED RELEASE ORAL ONCE
Status: COMPLETED | OUTPATIENT
Start: 2024-06-24 | End: 2024-06-24

## 2024-06-24 RX ORDER — CYANOCOBALAMIN 1000 UG/ML
1000 INJECTION, SOLUTION INTRAMUSCULAR; SUBCUTANEOUS ONCE
Status: COMPLETED | OUTPATIENT
Start: 2024-06-24 | End: 2024-06-24

## 2024-06-24 RX ADMIN — APIXABAN 5 MG: 5 TABLET, FILM COATED ORAL at 09:19

## 2024-06-24 RX ADMIN — CYANOCOBALAMIN 1000 MCG: 1000 INJECTION, SOLUTION INTRAMUSCULAR at 14:14

## 2024-06-24 RX ADMIN — BUDESONIDE AND FORMOTEROL FUMARATE DIHYDRATE 2 PUFF: 160; 4.5 AEROSOL RESPIRATORY (INHALATION) at 17:15

## 2024-06-24 RX ADMIN — INSULIN LISPRO 1 UNITS: 100 INJECTION, SOLUTION INTRAVENOUS; SUBCUTANEOUS at 21:46

## 2024-06-24 RX ADMIN — MAGNESIUM OXIDE TAB 400 MG (241.3 MG ELEMENTAL MG) 400 MG: 400 (241.3 MG) TAB at 09:18

## 2024-06-24 RX ADMIN — MAGNESIUM OXIDE TAB 400 MG (241.3 MG ELEMENTAL MG) 400 MG: 400 (241.3 MG) TAB at 17:14

## 2024-06-24 RX ADMIN — METOPROLOL SUCCINATE 50 MG: 50 TABLET, EXTENDED RELEASE ORAL at 09:19

## 2024-06-24 RX ADMIN — CYANOCOBALAMIN TAB 500 MCG 1000 MCG: 500 TAB at 17:58

## 2024-06-24 RX ADMIN — BUMETANIDE 6 MG: 2 TABLET ORAL at 09:18

## 2024-06-24 RX ADMIN — INSULIN GLARGINE 20 UNITS: 100 INJECTION, SOLUTION SUBCUTANEOUS at 21:46

## 2024-06-24 RX ADMIN — IRON SUCROSE 300 MG: 20 INJECTION, SOLUTION INTRAVENOUS at 12:46

## 2024-06-24 RX ADMIN — THIAMINE HYDROCHLORIDE: 100 INJECTION, SOLUTION INTRAMUSCULAR; INTRAVENOUS at 14:46

## 2024-06-24 RX ADMIN — POTASSIUM CHLORIDE 40 MEQ: 1500 TABLET, EXTENDED RELEASE ORAL at 17:58

## 2024-06-24 RX ADMIN — UMECLIDINIUM 1 PUFF: 62.5 AEROSOL, POWDER ORAL at 09:20

## 2024-06-24 RX ADMIN — APIXABAN 5 MG: 5 TABLET, FILM COATED ORAL at 21:45

## 2024-06-24 RX ADMIN — BUDESONIDE AND FORMOTEROL FUMARATE DIHYDRATE 2 PUFF: 160; 4.5 AEROSOL RESPIRATORY (INHALATION) at 09:20

## 2024-06-24 RX ADMIN — ATORVASTATIN CALCIUM 10 MG: 10 TABLET, FILM COATED ORAL at 09:19

## 2024-06-24 RX ADMIN — PREGABALIN 50 MG: 50 CAPSULE ORAL at 21:46

## 2024-06-24 RX ADMIN — INSULIN LISPRO 5 UNITS: 100 INJECTION, SOLUTION INTRAVENOUS; SUBCUTANEOUS at 17:15

## 2024-06-24 RX ADMIN — INSULIN LISPRO 5 UNITS: 100 INJECTION, SOLUTION INTRAVENOUS; SUBCUTANEOUS at 12:46

## 2024-06-24 RX ADMIN — SPIRONOLACTONE 25 MG: 25 TABLET ORAL at 09:18

## 2024-06-24 RX ADMIN — EMPAGLIFLOZIN 10 MG: 10 TABLET, FILM COATED ORAL at 09:21

## 2024-06-24 NOTE — ASSESSMENT & PLAN NOTE
Lab Results   Component Value Date    HGBA1C 8.3 (H) 06/19/2024       Recent Labs     06/23/24  2125 06/24/24  0747 06/24/24  1143 06/24/24  1649   POCGLU 194* 118 212* 145*         Blood Sugar Average: Last 72 hrs:  (P) 173.6541457436625961  Continue with lyrica nightly   Start Lantus for hyperglycemia

## 2024-06-24 NOTE — ASSESSMENT & PLAN NOTE
Lab Results   Component Value Date    HGBA1C 8.3 (H) 06/19/2024       Recent Labs     06/23/24  2125 06/24/24  0747 06/24/24  1143 06/24/24  1649   POCGLU 194* 118 212* 145*         Blood Sugar Average: Last 72 hrs:  (P) 173.5130180298446120    Monitor fingerstick glucose levels  Insulin sliding scale as needed, received 5u of regular insulin tonight  High dose ISS due to elevated blood glucose reasons likely due to steroid use

## 2024-06-24 NOTE — PLAN OF CARE
Problem: PAIN - ADULT  Goal: Verbalizes/displays adequate comfort level or baseline comfort level  Description: Interventions:  - Encourage patient to monitor pain and request assistance  - Assess pain using appropriate pain scale  - Administer analgesics based on type and severity of pain and evaluate response  - Implement non-pharmacological measures as appropriate and evaluate response  - Consider cultural and social influences on pain and pain management  - Notify physician/advanced practitioner if interventions unsuccessful or patient reports new pain  Outcome: Progressing     Problem: INFECTION - ADULT  Goal: Absence or prevention of progression during hospitalization  Description: INTERVENTIONS:  - Assess and monitor for signs and symptoms of infection  - Monitor lab/diagnostic results  - Monitor all insertion sites, i.e. indwelling lines, tubes, and drains  - Monitor endotracheal if appropriate and nasal secretions for changes in amount and color  - [place] appropriate cooling/warming therapies per order  - Administer medications as ordered  - Instruct and encourage patient and family to use good hand hygiene technique  - Identify and instruct in appropriate isolation precautions for identified infection/condition  Outcome: Progressing  Goal: Absence of fever/infection during neutropenic period  Description: INTERVENTIONS:  - Monitor WBC    Outcome: Progressing     Problem: SAFETY ADULT  Goal: Patient will remain free of falls  Description: INTERVENTIONS:  - Educate patient/family on patient safety including physical limitations  - Instruct patient to call for assistance with activity   - Consult OT/PT to assist with strengthening/mobility   - Keep Call bell within reach  - Keep bed low and locked with side rails adjusted as appropriate  - Keep care items and personal belongings within reach  - Initiate and maintain comfort rounds  - Make Fall Risk Sign visible to staff  - Offer Toileting every 2 Hours,  in advance of need  - Initiate/Maintain alarm  - Obtain necessary fall risk management equipment  - Apply yellow socks and bracelet for high fall risk patients  - Consider moving patient to room near nurses station  Outcome: Progressing  Goal: Maintain or return to baseline ADL function  Description: INTERVENTIONS:  -  Assess patient's ability to carry out ADLs; assess patient's baseline for ADL function and identify physical deficits which impact ability to perform ADLs (bathing, care of mouth/teeth, toileting, grooming, dressing, etc.)  - Assess/evaluate cause of self-care deficits   - Assess range of motion  - Assess patient's mobility; develop plan if impaired  - Assess patient's need for assistive devices and provide as appropriate  - Encourage maximum independence but intervene and supervise when necessary  - Involve family in performance of ADLs  - Assess for home care needs following discharge   - Consider OT consult to assist with ADL evaluation and planning for discharge  - Provide patient education as appropriate  Outcome: Progressing  Goal: Maintains/Returns to pre admission functional level  Description: INTERVENTIONS:  - Perform AM-PAC 6 Click Basic Mobility/ Daily Activity assessment daily.  - Set and communicate daily mobility goal to care team and patient/family/caregiver.   - Collaborate with rehabilitation services on mobility goals if consulted  - Perform Range of Motion 3 times a day.  - Reposition patient every 2 hours.  - Dangle patient 3 times a day  - Stand patient 3 times a day  - Ambulate patient 3 times a day  - Out of bed to chair 3 times a day   - Out of bed for meals 3 times a day  - Out of bed for toileting  - Record patient progress and toleration of activity level   Outcome: Progressing     Problem: DISCHARGE PLANNING  Goal: Discharge to home or other facility with appropriate resources  Description: INTERVENTIONS:  - Identify barriers to discharge w/patient and caregiver  - Arrange  for needed discharge resources and transportation as appropriate  - Identify discharge learning needs (meds, wound care, etc.)  - Arrange for interpretive services to assist at discharge as needed  - Refer to Case Management Department for coordinating discharge planning if the patient needs post-hospital services based on physician/advanced practitioner order or complex needs related to functional status, cognitive ability, or social support system  Outcome: Progressing     Problem: Knowledge Deficit  Goal: Patient/family/caregiver demonstrates understanding of disease process, treatment plan, medications, and discharge instructions  Description: Complete learning assessment and assess knowledge base.  Interventions:  - Provide teaching at level of understanding  - Provide teaching via preferred learning methods  Outcome: Progressing

## 2024-06-24 NOTE — ASSESSMENT & PLAN NOTE
Lab Results   Component Value Date    EGFR 41 06/24/2024    EGFR 47 06/23/2024    EGFR 45 06/22/2024    CREATININE 1.76 (H) 06/24/2024    CREATININE 1.58 (H) 06/23/2024    CREATININE 1.65 (H) 06/22/2024   Monitor Creatinine with resumption of diuretics

## 2024-06-24 NOTE — TELEPHONE ENCOUNTER
Patient called to state he has been a patient in the hospital since 6/18 & called to make cardiology aware that is why there have been no Cardiomemes readings.     Patient stated he will begin Cardiomems when discharged probably later this week.

## 2024-06-24 NOTE — PROGRESS NOTES
[facility]  Progress Note  Name: Mesfin Cano I  MRN: [id number]  Unit/Bed#: -01 I Date of Admission: 6/18/2024   Date of Service: 6/24/2024 I Hospital Day: 4    Assessment & Plan   Moderate persistent asthma without complication  Assessment & Plan  Patient with a hx of asthma, had PFT 2021 showing severe reversible obstruction  He is currently using albuterol inh, symbicort and spiriva, he has had to increase albuterol use in the last 1 week  No increase in cough or sputum production, no upper respiratory sxs  Here is is on room air, not using accessory muscle. Lungs sound clear w/o wheezing       Hyperkalemia  Assessment & Plan  Resolved     Anemia due to chronic kidney disease  Assessment & Plan  Hemoglobin currently at baseline  Outpatient follow up, will treat with IV iron    Chronic diastolic CHF (HCC)  Assessment & Plan  Wt Readings from Last 3 Encounters:   06/24/24 (!) 149 kg (328 lb)   05/24/24 (!) 150 kg (331 lb 9.6 oz)   02/20/24 (!) 151 kg (332 lb 12.8 oz)     Currently appears euvolemic  Daily weights  Home Bumex resumed we will monitor urinary output Daily weights and creatinine  We will resume Aldactone            Diabetic polyneuropathy associated with type 2 diabetes mellitus (HCC)  Assessment & Plan  Lab Results   Component Value Date    HGBA1C 8.3 (H) 06/19/2024       Recent Labs     06/23/24  2125 06/24/24  0747 06/24/24  1143 06/24/24  1649   POCGLU 194* 118 212* 145*         Blood Sugar Average: Last 72 hrs:  (P) [id number]  Continue with lyrica nightly   Start Lantus for hyperglycemia    CKD (chronic kidney disease) stage 3 (HCC)  Assessment & Plan  Lab Results   Component Value Date    EGFR 41 06/24/2024    EGFR 47 06/23/2024    EGFR 45 06/22/2024    CREATININE 1.76 (H) 06/24/2024    CREATININE 1.58 (H) 06/23/2024    CREATININE 1.65 (H) 06/22/2024   Monitor Creatinine with resumption of diuretics    Hyponatremia  Assessment &  Plan  Sodium levels improved  Continue to monitor    Type 2 diabetes mellitus with hyperglycemia, without long-term current use of insulin (HCC)  Assessment & Plan  Lab Results   Component Value Date    HGBA1C 8.3 (H) 06/19/2024       Recent Labs     06/23/24  2125 06/24/24  0747 06/24/24  1143 06/24/24  1649   POCGLU 194* 118 212* 145*         Blood Sugar Average: Last 72 hrs:  (P) 173.0307065874085284    Monitor fingerstick glucose levels  Insulin sliding scale as needed, received 5u of regular insulin tonight  High dose ISS due to elevated blood glucose reasons likely due to steroid use      * Uncontrolled atrial flutter (HCC)  Assessment & Plan  Patient presented with 1 week history of shortness of breath and chest pain  On presentation to emergency room he was noted to have atrial flutter with heart rate in the 140s  He is status post electrical cardioversion in the ED with return to normal sinus rhythm  He admits to not taking his metoprolol the last 2 days due to feeling lightheaded/dizzy - suspect due to tachycardia induced hypotension worsened by BB  Place in observation unit  Cardiac telemetry  Follow-up TSH, troponins w/EKG  Echocardiogram   Continue with metoprolol 50 mg daily,  Continue with Eliquis 5 mg twice daily  Due to intermittent episodes of chest pain and some hypokinesis on echo we will check a cardiac stress test                     VTE Pharmacologic Prophylaxis:   Moderate Risk (Score 3-4) - Pharmacological DVT Prophylaxis Ordered: apixaban (Eliquis).    Mobility:   Basic Mobility Inpatient Raw Score: 22  JH-HLM Goal: 7: Walk 25 feet or more  JH-HLM Achieved: 7: Walk 25 feet or more  JH-HLM Goal achieved. Continue to encourage appropriate mobility.    Patient Centered Rounds: I performed bedside rounds with nursing staff today.   Discussions with Specialists or Other Care Team Provider:     Education and Discussions with Family / Patient: Patient declined call to .     Total  Time Spent on Date of Encounter in care of patient:  mins. This time was spent on one or more of the following: performing physical exam; counseling and coordination of care; obtaining or reviewing history; documenting in the medical record; reviewing/ordering tests, medications or procedures; communicating with other healthcare professionals and discussing with patient's family/caregivers.    Current Length of Stay: 4 day(s)  Current Patient Status: Inpatient   Certification Statement: The patient will continue to require additional inpatient hospital stay due to stress test  Discharge Plan: Anticipate discharge in 24-48 hrs to home with home services.    Code Status: Level 1 - Full Code    Denies shortness of breath denies lightheaded dizziness subjective:   Patient denies any recurrence of chest pain    Objective:     Vitals:   Temp (24hrs), Av.3 °F (36.8 °C), Min:97.6 °F (36.4 °C), Max:98.7 °F (37.1 °C)    Temp:  [97.6 °F (36.4 °C)-98.7 °F (37.1 °C)] 97.6 °F (36.4 °C)  HR:  [57-93] 57  Resp:  [16-18] 16  BP: (104-134)/(55-79) 104/55  SpO2:  [89 %-98 %] 91 %  Body mass index is 43.27 kg/m².     Input and Output Summary (last 24 hours):     Intake/Output Summary (Last 24 hours) at 2024 1734  Last data filed at 2024 1719  Gross per 24 hour   Intake 1622 ml   Output 2850 ml   Net -1228 ml       Physical Exam:   Physical Exam  Vitals and nursing note reviewed.   Constitutional:       General: He is not in acute distress.     Appearance: He is well-developed. He is not toxic-appearing or diaphoretic.   HENT:      Head: Normocephalic and atraumatic.   Eyes:      General: No scleral icterus.     Conjunctiva/sclera: Conjunctivae normal.   Cardiovascular:      Rate and Rhythm: Normal rate and regular rhythm.      Heart sounds: No murmur heard.     No friction rub. No gallop.   Pulmonary:      Effort: Pulmonary effort is normal. No respiratory distress.      Breath sounds: Normal breath sounds. No stridor. No  wheezing, rhonchi or rales.   Chest:      Chest wall: No tenderness.   Abdominal:      General: There is no distension.      Palpations: Abdomen is soft. There is no mass.      Tenderness: There is no abdominal tenderness. There is no guarding or rebound.      Hernia: No hernia is present.   Musculoskeletal:         General: No swelling or tenderness.      Cervical back: Neck supple.   Skin:     General: Skin is warm and dry.      Capillary Refill: Capillary refill takes less than 2 seconds.   Neurological:      Mental Status: He is alert and oriented to person, place, and time.   Psychiatric:         Mood and Affect: Mood normal.          Additional Data:     Labs:  Results from last 7 days   Lab Units 06/22/24  0146   WBC Thousand/uL 10.00   HEMOGLOBIN g/dL 8.1*   HEMATOCRIT % 27.5*   PLATELETS Thousands/uL 287   SEGS PCT % 77*   LYMPHO PCT % 11*   MONO PCT % 8   EOS PCT % 2     Results from last 7 days   Lab Units 06/24/24  1410 06/22/24  1328 06/22/24  0146   SODIUM mmol/L 137   < > 135   POTASSIUM mmol/L 3.4*   < > 3.3*   CHLORIDE mmol/L 98   < > 97   CO2 mmol/L 26   < > 26   BUN mg/dL 36*   < > 51*   CREATININE mg/dL 1.76*   < > 1.78*   ANION GAP mmol/L 13   < > 12   CALCIUM mg/dL 7.0*   < > 7.8*   ALBUMIN g/dL  --   --  3.8   TOTAL BILIRUBIN mg/dL  --   --  0.68   ALK PHOS U/L  --   --  94   ALT U/L  --   --  8   AST U/L  --   --  11*   GLUCOSE RANDOM mg/dL 205*   < > 198*    < > = values in this interval not displayed.         Results from last 7 days   Lab Units 06/24/24  1649 06/24/24  1143 06/24/24  0747 06/23/24  2125 06/23/24  1657 06/23/24  1156 06/23/24  0755 06/22/24  2109 06/22/24  1657 06/22/24  1203 06/22/24  0755 06/21/24  2113   POC GLUCOSE mg/dl 145* 212* 118 194* 151* 181* 143* 181* 150* 179* 153* 218*     Results from last 7 days   Lab Units 06/19/24  0313   HEMOGLOBIN A1C % 8.3*           Lines/Drains:  Invasive Devices       Peripheral Intravenous Line  Duration             Peripheral IV  24 Right;Ventral (anterior) Forearm <1 day                      Telemetry:  Telemetry Orders (From admission, onward)               24 Hour Telemetry Monitoring  Continuous x 24 Hours (Telem)           Question:  Reason for 24 Hour Telemetry  Answer:  PCI/EP study (including pacer and ICD implementation), Cardiac surgery, MI, abnormal cardiac cath, and chest pain- rule out MI                                Imaging: No pertinent imaging reviewed.    Recent Cultures (last 7 days):         Last 24 Hours Medication List:   Current Facility-Administered Medications   Medication Dose Route Frequency Provider Last Rate    acetaminophen  650 mg Oral Q6H PRN Nancy Polo MD      albuterol  2 puff Inhalation Q4H PRN Faheem Banda,       aluminum-magnesium hydroxide-simethicone  30 mL Oral Q4H PRN RODRIGUE Morley      apixaban  5 mg Oral BID Nancy Polo MD      atorvastatin  10 mg Oral Daily Nancy Polo MD      budesonide-formoterol  2 puff Inhalation BID Nancy Polo MD      bumetanide  6 mg Oral BID Yamileth Mallory MD      calcium carbonate  500 mg Oral Daily PRN RODRIGUE Morley      Empagliflozin  10 mg Oral Daily Nancy Polo MD      folic acid 1 mg, thiamine (VITAMIN B1) 100 mg in sodium chloride 0.9 % 100 mL IV piggyback   Intravenous Daily Faheem Banda,  mL/hr at 24 1446    insulin glargine  20 Units Subcutaneous HS Faheem Banda, DO      insulin lispro  1-6 Units Subcutaneous HS Nancy Polo MD      insulin lispro  5 Units Subcutaneous TID With Meals Faheem Banda, DO      magnesium Oxide  400 mg Oral BID Faheem Banda, DO      metoprolol succinate  50 mg Oral Daily Nancy Polo MD      nitroglycerin  0.4 mg Sublingual Q5 Min PRN Faheem Banda,       potassium chloride  40 mEq Oral Once Faheem Banda, DO      pregabalin  50 mg Oral HS Nancy Polo MD      spironolactone  25 mg Oral Daily Faheem Banda, DO      umeclidinium  1 puff Inhalation  Daily Nancy Polo MD          Today, Patient Was Seen By: Faheem Banda DO    **Please Note: This note may have been constructed using a voice recognition system.**

## 2024-06-24 NOTE — ASSESSMENT & PLAN NOTE
Wt Readings from Last 3 Encounters:   06/24/24 (!) 149 kg (328 lb)   05/24/24 (!) 150 kg (331 lb 9.6 oz)   02/20/24 (!) 151 kg (332 lb 12.8 oz)     Currently appears euvolemic  Daily weights  Home Bumex resumed we will monitor urinary output Daily weights and creatinine  We will resume Aldactone

## 2024-06-24 NOTE — ASSESSMENT & PLAN NOTE
Patient presented with 1 week history of shortness of breath and chest pain  On presentation to emergency room he was noted to have atrial flutter with heart rate in the 140s  He is status post electrical cardioversion in the ED with return to normal sinus rhythm  He admits to not taking his metoprolol the last 2 days due to feeling lightheaded/dizzy - suspect due to tachycardia induced hypotension worsened by BB  Place in observation unit  Cardiac telemetry  Follow-up TSH, troponins w/EKG  Echocardiogram   Continue with metoprolol 50 mg daily,  Continue with Eliquis 5 mg twice daily  Due to intermittent episodes of chest pain and some hypokinesis on echo we will check a cardiac stress test

## 2024-06-25 ENCOUNTER — APPOINTMENT (INPATIENT)
Dept: RADIOLOGY | Facility: HOSPITAL | Age: 59
DRG: 309 | End: 2024-06-25
Payer: COMMERCIAL

## 2024-06-25 ENCOUNTER — APPOINTMENT (INPATIENT)
Dept: NON INVASIVE DIAGNOSTICS | Facility: HOSPITAL | Age: 59
DRG: 309 | End: 2024-06-25
Payer: COMMERCIAL

## 2024-06-25 PROBLEM — E87.5 HYPERKALEMIA: Status: RESOLVED | Noted: 2024-06-18 | Resolved: 2024-06-25

## 2024-06-25 PROBLEM — E66.01 MORBID OBESITY (HCC): Status: ACTIVE | Noted: 2024-06-25

## 2024-06-25 PROBLEM — E87.1 HYPONATREMIA: Status: RESOLVED | Noted: 2021-07-31 | Resolved: 2024-06-25

## 2024-06-25 PROBLEM — R07.9 CHEST PAIN: Status: ACTIVE | Noted: 2024-06-25

## 2024-06-25 LAB
BASOPHILS # BLD AUTO: 0.04 THOUSANDS/ÂΜL (ref 0–0.1)
BASOPHILS NFR BLD AUTO: 0 % (ref 0–1)
CHEST PAIN STATEMENT: NORMAL
EOSINOPHIL # BLD AUTO: 0.46 THOUSAND/ÂΜL (ref 0–0.61)
EOSINOPHIL NFR BLD AUTO: 4 % (ref 0–6)
ERYTHROCYTE [DISTWIDTH] IN BLOOD BY AUTOMATED COUNT: 17.4 % (ref 11.6–15.1)
GLUCOSE SERPL-MCNC: 134 MG/DL (ref 65–140)
GLUCOSE SERPL-MCNC: 175 MG/DL (ref 65–140)
GLUCOSE SERPL-MCNC: 190 MG/DL (ref 65–140)
GLUCOSE SERPL-MCNC: 256 MG/DL (ref 65–140)
HCT VFR BLD AUTO: 29.2 % (ref 36.5–49.3)
HGB BLD-MCNC: 8.5 G/DL (ref 12–17)
IMM GRANULOCYTES # BLD AUTO: 0.12 THOUSAND/UL (ref 0–0.2)
IMM GRANULOCYTES NFR BLD AUTO: 1 % (ref 0–2)
LYMPHOCYTES # BLD AUTO: 1.06 THOUSANDS/ÂΜL (ref 0.6–4.47)
LYMPHOCYTES NFR BLD AUTO: 10 % (ref 14–44)
MAX DIASTOLIC BP: 58 MMHG
MAX HR PERCENT: 63 %
MAX HR: 103 BPM
MAX PREDICTED HEART RATE: 163 BPM
MCH RBC QN AUTO: 22.7 PG (ref 26.8–34.3)
MCHC RBC AUTO-ENTMCNC: 29.1 G/DL (ref 31.4–37.4)
MCV RBC AUTO: 78 FL (ref 82–98)
MONOCYTES # BLD AUTO: 1.02 THOUSAND/ÂΜL (ref 0.17–1.22)
MONOCYTES NFR BLD AUTO: 10 % (ref 4–12)
NEUTROPHILS # BLD AUTO: 8.06 THOUSANDS/ÂΜL (ref 1.85–7.62)
NEUTS SEG NFR BLD AUTO: 75 % (ref 43–75)
NRBC BLD AUTO-RTO: 0 /100 WBCS
NUC STRESS EJECTION FRACTION: 51 %
PLATELET # BLD AUTO: 331 THOUSANDS/UL (ref 149–390)
PMV BLD AUTO: 10.6 FL (ref 8.9–12.7)
PROTOCOL NAME: NORMAL
RATE PRESSURE PRODUCT: NORMAL
RBC # BLD AUTO: 3.75 MILLION/UL (ref 3.88–5.62)
REASON FOR TERMINATION: NORMAL
SL CV REST NUCLEAR ISOTOPE DOSE: 15.8 MCI
SL CV STRESS NUCLEAR ISOTOPE DOSE: 49.4 MCI
SL CV STRESS RECOVERY BP: NORMAL MMHG
SL CV STRESS RECOVERY HR: 76 BPM
SL CV STRESS RECOVERY O2 SAT: 99 %
STRESS ANGINA INDEX: 0
STRESS BASELINE BP: NORMAL MMHG
STRESS BASELINE HR: 77 BPM
STRESS O2 SAT REST: 100 %
STRESS PEAK HR: 103 BPM
STRESS POST EXERCISE DUR MIN: 3 MIN
STRESS POST EXERCISE DUR MIN: 6 MIN
STRESS POST EXERCISE DUR SEC: 0 SEC
STRESS POST EXERCISE DUR SEC: 50 SEC
STRESS POST O2 SAT PEAK: 99 %
STRESS POST PEAK BP: 112 MMHG
STRESS POST PEAK HR: 103 BPM
STRESS POST PEAK SYSTOLIC BP: 112 MMHG
STRESS/REST PERFUSION RATIO: 1.03
TARGET HR FORMULA: NORMAL
TEST INDICATION: NORMAL
WBC # BLD AUTO: 10.76 THOUSAND/UL (ref 4.31–10.16)

## 2024-06-25 PROCEDURE — 82948 REAGENT STRIP/BLOOD GLUCOSE: CPT

## 2024-06-25 PROCEDURE — 99232 SBSQ HOSP IP/OBS MODERATE 35: CPT | Performed by: INTERNAL MEDICINE

## 2024-06-25 PROCEDURE — A9502 TC99M TETROFOSMIN: HCPCS

## 2024-06-25 PROCEDURE — 78452 HT MUSCLE IMAGE SPECT MULT: CPT

## 2024-06-25 PROCEDURE — 97116 GAIT TRAINING THERAPY: CPT

## 2024-06-25 PROCEDURE — 78452 HT MUSCLE IMAGE SPECT MULT: CPT | Performed by: INTERNAL MEDICINE

## 2024-06-25 PROCEDURE — 93017 CV STRESS TEST TRACING ONLY: CPT

## 2024-06-25 PROCEDURE — 93016 CV STRESS TEST SUPVJ ONLY: CPT | Performed by: INTERNAL MEDICINE

## 2024-06-25 PROCEDURE — 93018 CV STRESS TEST I&R ONLY: CPT | Performed by: INTERNAL MEDICINE

## 2024-06-25 PROCEDURE — 85025 COMPLETE CBC W/AUTO DIFF WBC: CPT | Performed by: INTERNAL MEDICINE

## 2024-06-25 RX ORDER — POTASSIUM CHLORIDE 20 MEQ/1
40 TABLET, EXTENDED RELEASE ORAL ONCE
Status: COMPLETED | OUTPATIENT
Start: 2024-06-25 | End: 2024-06-25

## 2024-06-25 RX ORDER — REGADENOSON 0.08 MG/ML
0.4 INJECTION, SOLUTION INTRAVENOUS ONCE
Status: COMPLETED | OUTPATIENT
Start: 2024-06-25 | End: 2024-06-25

## 2024-06-25 RX ORDER — REGADENOSON 0.08 MG/ML
INJECTION, SOLUTION INTRAVENOUS
Status: DISPENSED
Start: 2024-06-25 | End: 2024-06-26

## 2024-06-25 RX ORDER — AMINOPHYLLINE 25 MG/ML
INJECTION, SOLUTION INTRAVENOUS
Status: DISCONTINUED
Start: 2024-06-25 | End: 2024-06-25 | Stop reason: WASHOUT

## 2024-06-25 RX ADMIN — INSULIN GLARGINE 20 UNITS: 100 INJECTION, SOLUTION SUBCUTANEOUS at 21:29

## 2024-06-25 RX ADMIN — BUDESONIDE AND FORMOTEROL FUMARATE DIHYDRATE 2 PUFF: 160; 4.5 AEROSOL RESPIRATORY (INHALATION) at 17:52

## 2024-06-25 RX ADMIN — INSULIN LISPRO 5 UNITS: 100 INJECTION, SOLUTION INTRAVENOUS; SUBCUTANEOUS at 14:49

## 2024-06-25 RX ADMIN — INSULIN LISPRO 5 UNITS: 100 INJECTION, SOLUTION INTRAVENOUS; SUBCUTANEOUS at 09:34

## 2024-06-25 RX ADMIN — BUDESONIDE AND FORMOTEROL FUMARATE DIHYDRATE 2 PUFF: 160; 4.5 AEROSOL RESPIRATORY (INHALATION) at 08:35

## 2024-06-25 RX ADMIN — THIAMINE HYDROCHLORIDE: 100 INJECTION, SOLUTION INTRAMUSCULAR; INTRAVENOUS at 08:50

## 2024-06-25 RX ADMIN — INSULIN LISPRO 5 UNITS: 100 INJECTION, SOLUTION INTRAVENOUS; SUBCUTANEOUS at 17:49

## 2024-06-25 RX ADMIN — POTASSIUM CHLORIDE 40 MEQ: 1500 TABLET, EXTENDED RELEASE ORAL at 08:34

## 2024-06-25 RX ADMIN — EMPAGLIFLOZIN 10 MG: 10 TABLET, FILM COATED ORAL at 08:37

## 2024-06-25 RX ADMIN — ATORVASTATIN CALCIUM 10 MG: 10 TABLET, FILM COATED ORAL at 17:49

## 2024-06-25 RX ADMIN — APIXABAN 5 MG: 5 TABLET, FILM COATED ORAL at 21:29

## 2024-06-25 RX ADMIN — MAGNESIUM OXIDE TAB 400 MG (241.3 MG ELEMENTAL MG) 400 MG: 400 (241.3 MG) TAB at 17:49

## 2024-06-25 RX ADMIN — PREGABALIN 50 MG: 50 CAPSULE ORAL at 21:29

## 2024-06-25 RX ADMIN — REGADENOSON 0.4 MG: 0.08 INJECTION, SOLUTION INTRAVENOUS at 13:35

## 2024-06-25 RX ADMIN — APIXABAN 5 MG: 5 TABLET, FILM COATED ORAL at 08:34

## 2024-06-25 RX ADMIN — BUMETANIDE 6 MG: 2 TABLET ORAL at 17:58

## 2024-06-25 RX ADMIN — CYANOCOBALAMIN TAB 500 MCG 1000 MCG: 500 TAB at 08:34

## 2024-06-25 RX ADMIN — INSULIN LISPRO 1 UNITS: 100 INJECTION, SOLUTION INTRAVENOUS; SUBCUTANEOUS at 21:28

## 2024-06-25 RX ADMIN — MAGNESIUM OXIDE TAB 400 MG (241.3 MG ELEMENTAL MG) 400 MG: 400 (241.3 MG) TAB at 08:34

## 2024-06-25 RX ADMIN — UMECLIDINIUM 1 PUFF: 62.5 AEROSOL, POWDER ORAL at 08:36

## 2024-06-25 NOTE — PROGRESS NOTES
[facility]  Progress Note  Name: Mesfin Cano I  MRN: [id number]  Unit/Bed#: -01 I Date of Admission: 6/18/2024   Date of Service: 6/25/2024 I Hospital Day: 5    Assessment & Plan   * Uncontrolled atrial flutter (HCC)  Assessment & Plan  Patient presented with 1 week history of shortness of breath and chest pain, on admission noted to have atrial flutter with heart rate in the 140s. Status post electrical cardioversion in the ED with return to NSR  Was not taking metoprolol for 2 days PTA due to feeling lightheaded/dizzy - suspect due to tachycardia induced hypotension worsened by BB  Normal TSH  Echo 6/20 EF 55%, akinetic basal inferolateral and mid inferolateral   Continue with metoprolol 50 mg daily  Continue with Eliquis 5 mg twice daily  Cardiology cleared for dc but as below stress test was ordered by ADEN WAGNER, f/u results given CP          Chest pain  Assessment & Plan  Noted, had negative coronary angiography 2022  Cards signed off however ADEN WAGNER ordered stress test on 6/24, thus will f/u results     Chronic diastolic CHF (HCC)  Assessment & Plan  Wt Readings from Last 3 Encounters:   06/25/24 (!) 149 kg (327 lb 8 oz)   05/24/24 (!) 150 kg (331 lb 9.6 oz)   02/20/24 (!) 151 kg (332 lb 12.8 oz)     Currently appears euvolemic, BPs low normal occasionally limiting diuretic therapy. Follows with HF outpatient. EF 55% as above this admission. At baseline is o n Bumex 6 BID and metolazone PRN with a baseline dry weight around 332 pounds.   Continue home dose diuretics  Monitor             CKD (chronic kidney disease) stage 3 (HCC)  Assessment & Plan  Lab Results   Component Value Date    EGFR 41 06/24/2024    EGFR 47 06/23/2024    EGFR 45 06/22/2024    CREATININE 1.76 (H) 06/24/2024    CREATININE 1.58 (H) 06/23/2024    CREATININE 1.65 (H) 06/22/2024     Baseline around 1.7 per record review  stable    Morbid obesity (HCC)  Assessment & Plan  Weight loss recommended      Moderate persistent asthma without complication  Assessment & Plan  Patient with a hx of asthma, had PFT 2021 showing severe reversible obstruction  He is currently using albuterol inh, symbicort and spiriva PTA  No increase in cough or sputum production, no upper respiratory sxs    Anemia due to chronic kidney disease  Assessment & Plan  Baseline appears 7-8. Due to eosinophilia in 2023 underwent BMB in December.  Per review, morphology evaluation, flow cytometry, cytogenetics, FISH studies and molecular studies all together did not demonstrate any evidence of a primary hematologic malignancy. Specifically pathologist thought that the elevated eosinophil count was secondary/reactive to the patient's medical issues per hematologist note  Was given IV iron this admission given hx of Samuel-en-Y gastric bypass  Monitor CBC  Consider outpatient EGD/Colon if warranted however no bleeding     Diabetic polyneuropathy associated with type 2 diabetes mellitus (HCC)  Assessment & Plan  Lab Results   Component Value Date    HGBA1C 8.3 (H) 06/19/2024       Recent Labs     06/24/24  1143 06/24/24  1649 06/24/24  2114 06/25/24  0752   POCGLU 212* 145* 156* 134         Blood Sugar Average: Last 72 hrs:  (P) [id number]  A1C 8.3, only on oral medications at home. Here is on basal/bolus insulin, continue for now  Recommend f/u PCP or endo referral at discharge                VTE Pharmacologic Prophylaxis:   Moderate Risk (Score 3-4) - Pharmacological DVT Prophylaxis Ordered: apixaban (Eliquis).    Mobility:   Basic Mobility Inpatient Raw Score: 22  JH-HLM Goal: 7: Walk 25 feet or more  JH-HLM Achieved: 6: Walk 10 steps or more  JH-HLM Goal achieved. Continue to encourage appropriate mobility.    Patient Centered Rounds: I performed bedside rounds with nursing staff today.   Discussions with Specialists or Other Care Team Provider: none today    Education and Discussions with Family / Patient: Patient declined call to  .     Total Time Spent on Date of Encounter in care of patient: 34 mins. This time was spent on one or more of the following: performing physical exam; counseling and coordination of care; obtaining or reviewing history; documenting in the medical record; reviewing/ordering tests, medications or procedures; communicating with other healthcare professionals and discussing with patient's family/caregivers.    Current Length of Stay: 5 day(s)  Current Patient Status: Inpatient   Certification Statement: The patient will continue to require additional inpatient hospital stay due to stress test  Discharge Plan: Anticipate discharge tomorrow to home.    Code Status: Level 1 - Full Code    Subjective:   Doing well today. Denies CP or SOB.     Objective:     Vitals:   Temp (24hrs), Av.8 °F (36.6 °C), Min:97.4 °F (36.3 °C), Max:98.4 °F (36.9 °C)    Temp:  [97.4 °F (36.3 °C)-98.4 °F (36.9 °C)] 97.4 °F (36.3 °C)  HR:  [57-86] 74  Resp:  [16-18] 18  BP: (100-134)/(55-65) 100/60  SpO2:  [89 %-99 %] 97 %  Body mass index is 43.21 kg/m².     Input and Output Summary (last 24 hours):     Intake/Output Summary (Last 24 hours) at 2024 0945  Last data filed at 2024 0900  Gross per 24 hour   Intake 1062 ml   Output 1400 ml   Net -338 ml       Physical Exam:   Physical Exam  Vitals and nursing note reviewed.   Constitutional:       General: He is not in acute distress.     Appearance: He is obese.      Comments: On RA    Cardiovascular:      Rate and Rhythm: Normal rate.   Pulmonary:      Effort: No respiratory distress.   Abdominal:      General: Bowel sounds are normal. There is no distension.      Palpations: Abdomen is soft.   Musculoskeletal:      Right lower leg: No edema.      Left lower leg: No edema.   Skin:     Coloration: Skin is pale.   Neurological:      Mental Status: He is alert and oriented to person, place, and time.   Psychiatric:         Mood and Affect: Mood normal.          Additional  Data:     Labs:  Results from last 7 days   Lab Units 06/25/24  0914   WBC Thousand/uL 10.76*   HEMOGLOBIN g/dL 8.5*   HEMATOCRIT % 29.2*   PLATELETS Thousands/uL 331   SEGS PCT % 75   LYMPHO PCT % 10*   MONO PCT % 10   EOS PCT % 4     Results from last 7 days   Lab Units 06/24/24  1410 06/22/24  1328 06/22/24  0146   SODIUM mmol/L 137   < > 135   POTASSIUM mmol/L 3.4*   < > 3.3*   CHLORIDE mmol/L 98   < > 97   CO2 mmol/L 26   < > 26   BUN mg/dL 36*   < > 51*   CREATININE mg/dL 1.76*   < > 1.78*   ANION GAP mmol/L 13   < > 12   CALCIUM mg/dL 7.0*   < > 7.8*   ALBUMIN g/dL  --   --  3.8   TOTAL BILIRUBIN mg/dL  --   --  0.68   ALK PHOS U/L  --   --  94   ALT U/L  --   --  8   AST U/L  --   --  11*   GLUCOSE RANDOM mg/dL 205*   < > 198*    < > = values in this interval not displayed.         Results from last 7 days   Lab Units 06/25/24  0752 06/24/24  2114 06/24/24  1649 06/24/24  1143 06/24/24  0747 06/23/24  2125 06/23/24  1657 06/23/24  1156 06/23/24  0755 06/22/24  2109 06/22/24  1657 06/22/24  1203   POC GLUCOSE mg/dl 134 156* 145* 212* 118 194* 151* 181* 143* 181* 150* 179*     Results from last 7 days   Lab Units 06/19/24  0313   HEMOGLOBIN A1C % 8.3*           Lines/Drains:  Invasive Devices       Peripheral Intravenous Line  Duration             Peripheral IV 06/24/24 Right;Ventral (anterior) Forearm <1 day                      Telemetry:  Telemetry Orders (From admission, onward)               24 Hour Telemetry Monitoring  Continuous x 24 Hours (Telem)        Question:  Reason for 24 Hour Telemetry  Answer:  PCI/EP study (including pacer and ICD implementation), Cardiac surgery, MI, abnormal cardiac cath, and chest pain- rule out MI                     Telemetry Reviewed:  reviewed  Indication for Continued Telemetry Use: Acute MI/Unstable Angina/Rule out ACS             Imaging: No pertinent imaging reviewed.    Recent Cultures (last 7 days):         Last 24 Hours Medication List:   Current  Facility-Administered Medications   Medication Dose Route Frequency Provider Last Rate    acetaminophen  650 mg Oral Q6H PRN Nancy Polo MD      albuterol  2 puff Inhalation Q4H PRN Faheem Banda, DO      aluminum-magnesium hydroxide-simethicone  30 mL Oral Q4H PRN RODRIGUE Morley      apixaban  5 mg Oral BID Nancy Polo MD      atorvastatin  10 mg Oral Daily Nancy Polo MD      budesonide-formoterol  2 puff Inhalation BID Nancy Polo MD      bumetanide  6 mg Oral BID Yamileth Mallory MD      calcium carbonate  500 mg Oral Daily PRN RODRIGUE Morley      vitamin B-12  1,000 mcg Oral Daily Faheem Banda, DO      Empagliflozin  10 mg Oral Daily Nancy Polo MD      folic acid 1 mg, thiamine (VITAMIN B1) 100 mg in sodium chloride 0.9 % 100 mL IV piggyback   Intravenous Daily Faheem Banda,  mL/hr at 06/24/24 1446    insulin glargine  20 Units Subcutaneous HS Faheem Banda, DO      insulin lispro  1-6 Units Subcutaneous HS Nancy Polo MD      insulin lispro  5 Units Subcutaneous TID With Meals Faheem Banda, DO      magnesium Oxide  400 mg Oral BID Faheem Banda, DO      metoprolol succinate  50 mg Oral Daily Nancy Polo MD      nitroglycerin  0.4 mg Sublingual Q5 Min PRN Faheem Banda, DO      pregabalin  50 mg Oral HS Nancy Polo MD      spironolactone  25 mg Oral Daily Faheem Banda, DO      umeclidinium  1 puff Inhalation Daily Nancy Polo MD          Today, Patient Was Seen By: Mattie Iglesias PA-C    **Please Note: This note may have been constructed using a voice recognition system.**

## 2024-06-25 NOTE — ASSESSMENT & PLAN NOTE
Lab Results   Component Value Date    HGBA1C 8.3 (H) 06/19/2024       Recent Labs     06/24/24  1143 06/24/24  1649 06/24/24  2114 06/25/24  0752   POCGLU 212* 145* 156* 134         Blood Sugar Average: Last 72 hrs:  (P) [id number]    Monitor fingerstick glucose levels  Insulin sliding scale as needed, received 5u of regular insulin tonight  High dose ISS due to elevated blood glucose reasons likely due to steroid use

## 2024-06-25 NOTE — UTILIZATION REVIEW
Continued Stay Review    Date: 6/25                          Current Patient Class: Inpatient  Current Level of Care: Med Surg    HPI:59 y.o. male initially admitted on 6/25     Assessment/Plan:   Stress test today. S/p iv iron this admit. Monitor Cbc.   Currently appears euvolemic, BPs low normal occasionally limiting diuretic therapy. Follows with HF outpatient. EF 55% as above this admission. At baseline is o n Bumex 6 BID and metolazone PRN with a baseline dry weight around 332 pounds.   Continue home dose diuretics    MN myocardial perfusion spect (rx stress and/or rest)  Interpretation Summary       Stress ECG: No ST deviation is noted. The ECG was not diagnostic due to pharmacological (vasodilator) stress.    Stress ECG: The patient experienced no angina during the test.    Perfusion: There is a left ventricular perfusion defect that is medium in size with moderate reduction in uptake present in the entire inferior and inferolateral location(s) that is partially reversible. The possibility of this defect being caused by diaphragmatic attenuation artifact cannot be completely excluded.    Stress Function: Left ventricular function post-stress is normal. Stress ejection fraction is 51%.    Stress Combined Conclusion: The ECG and SPECT imaging portions of the stress study are discordant but are nonetheless concerning for inducible myocardial ischemia given the known limited sensitivity of stress electrocardiography. Left ventricular perfusion is probably abnormal. The possibility of ischemia cannot be excluded.    Prior nuclear study available for comparison. Prior study date: 9/2/2022. Changes noted when compared to prior study. Changes include: Defect appears to be similar to prior study, but is worse and more severe..     Medium to large size partially reversible inferior/inferolateral perfusion defect concerning for inferior ischemia with inferolateral infarct v diaphragmatic attenuation artifact.  Prone  imaging could not be performed to further clarify this defect.  Clinical correlation is suggested.    Vital Signs (last 3 days)       Date/Time Temp Pulse Resp BP MAP (mmHg) SpO2 O2 Device Patient Position - Orthostatic VS Lilly Coma Scale Score Pain    06/25/24 15:21:49 97.9 °F (36.6 °C) 84 18 109/63 80 99 % None (Room air) Sitting -- --    06/25/24 1400 -- -- -- 107/54 77 -- -- -- -- --    06/25/24 1335 -- 77 -- 98/62 -- 100 % -- -- -- --    06/25/24 1125 -- -- -- -- -- -- -- -- -- No Pain    06/25/24 10:58:31 97.8 °F (36.6 °C) 77 18 106/61 76 99 % -- -- -- --    06/25/24 08:26:07 -- 74 -- 100/60 73 97 % -- -- -- --    06/25/24 0800 -- -- -- -- -- -- -- -- 15 No Pain    06/25/24 07:09:01 97.4 °F (36.3 °C) 80 18 100/61 74 96 % None (Room air) Lying -- --    06/25/24 0330 -- 71 18 105/64 78 94 % None (Room air) Lying -- --    06/24/24 2320 97.9 °F (36.6 °C) 86 18 105/65 78 99 % None (Room air) Lying -- --    06/24/24 1939 -- -- -- -- -- -- -- -- 15 No Pain    06/24/24 18:35:26 -- 66 -- 103/56 72 92 % -- -- -- --    06/24/24 18:35:05 -- 82 -- 103/56 72 89 % -- -- -- --    06/24/24 1506 -- 57 16 104/55 71 91 % None (Room air) Lying -- --    06/24/24 1505 97.6 °F (36.4 °C) -- -- -- -- -- -- -- -- --    06/24/24 11:12:01 98.4 °F (36.9 °C) 77 18 134/62 86 97 % None (Room air) Lying -- --    06/24/24 0800 -- -- -- -- -- -- -- -- -- No Pain    06/24/24 07:19:40 98.6 °F (37 °C) 79 17 116/67 83 98 % None (Room air) Lying -- --    06/24/24 02:20:44 -- 81 -- 112/69 83 97 % -- -- -- --    06/23/24 21:58:54 98.7 °F (37.1 °C) 93 -- 122/77 92 89 % -- -- -- --    06/23/24 19:19:26 98.3 °F (36.8 °C) 84 17 124/79 94 95 % None (Room air) Sitting -- No Pain    06/23/24 15:19:02 98.6 °F (37 °C) 91 17 117/68 84 98 % None (Room air) Sitting -- --    06/23/24 1100 -- -- -- -- -- -- None (Room air) Lying -- --    06/23/24 10:57:05 98 °F (36.7 °C) 77 16 106/63 77 97 % -- -- -- --    06/23/24 0830 -- -- -- -- -- 98 % None (Room air) -- --  No Pain    06/23/24 0823 -- -- -- -- -- 90 % -- -- -- --    06/23/24 0821 -- -- -- -- -- 91 % -- -- -- --    06/23/24 08:14:14 -- 81 -- 103/62 76 77 % -- -- -- --    06/23/24 07:21:15 97.4 °F (36.3 °C) 80 18 99/57 71 86 % None (Room air) Sitting -- --    06/23/24 02:30:07 -- 75 -- 104/53 70 96 % -- -- -- --    06/22/24 22:10:55 98.2 °F (36.8 °C) 75 -- 121/71 88 98 % -- -- -- --    06/22/24 19:25:06 98.6 °F (37 °C) 81 -- 121/67 85 98 % -- -- -- No Pain    06/22/24 15:39:37 98.3 °F (36.8 °C) 73 17 123/67 86 98 % None (Room air) Lying -- --    06/22/24 14:04:11 -- 90 -- 121/69 86 94 % -- -- -- --    06/22/24 0830 -- -- -- -- -- 92 % None (Room air) -- -- No Pain    06/22/24 07:32:45 98.2 °F (36.8 °C) 83 19 122/71 88 96 % None (Room air) Lying -- --    06/22/24 05:03:43 97.5 °F (36.4 °C) 116 18 124/71 89 97 % None (Room air) -- -- --    06/22/24 0310 98.4 °F (36.9 °C) 62 18 127/67 87 96 % None (Room air) Lying -- --    06/22/24 01:28:25 -- 83 -- 138/75 96 99 % -- -- -- --          Weight (last 2 days)       Date/Time Weight    06/25/24 1335 148 (327)    06/25/24 0500 149 (327.5)    06/24/24 0535 149 (328)    06/23/24 0557 148 (326.4)              Pertinent Labs/Diagnostic Results:   Radiology:  XR chest portable   Final Interpretation by Shar Dunbar MD (06/19 1441)      No acute cardiopulmonary disease.            Workstation performed: [id number]           Cardiology:  ECG 12 lead   Final Result by Yamileth Mallory MD (06/22 1500)   Sinus rhythm with frequent Premature ventricular complexes   Confirmed by Yamileth Mallory ([id number]) on 6/22/2024 3:47:54 PM      Echo complete w/ contrast if indicated   Final Result by Rohith Syed MD (06/20 0314)        Left Ventricle: Left ventricular cavity size is normal. Wall thickness    is mildly increased. There is mild concentric hypertrophy. The left    ventricular ejection fraction is 55%. Systolic function is normal.     The following segments are akinetic: basal  inferolateral and mid    inferolateral.     All other segments are normal.     Left Atrium: The atrium is moderately dilated (42-48 mL/m2).     Right Atrium: The atrium is mildly dilated.         ECG 12 lead   Final Result by Yamileth Mallory MD (06/20 1449)   Sinus rhythm with Premature atrial complexes with Aberrant conduction   Nonspecific ST abnormality   Prolonged QT   Abnormal ECG   Confirmed by Yamileth Mallory ([id number]) on 6/20/2024 2:49:23 PM      ECG 12 lead   Final Result by Wm Campbell DO (06/19 1447)   Normal sinus rhythm   Nonspecific ST abnormality   Abnormal ECG   When compared with ECG of 19-JUN-2024 00:56, (unconfirmed)   No significant change was found   Confirmed by Wm Campbell ([id number]) on 6/19/2024 2:47:37 PM      ECG 12 lead   Final Result by Wm Campbell DO (06/19 1447)   Normal sinus rhythm   Normal ECG   When compared with ECG of 19-JUN-2024 00:55, (unconfirmed)   Premature atrial complexes are no longer Present   Confirmed by Wm Campbell ([id number]) on 6/19/2024 2:47:34 PM      ECG 12 lead   Final Result by Wm Campbell DO (06/19 1447)   Sinus rhythm with Premature atrial complexes   Nonspecific ST abnormality   Prolonged QT   Abnormal ECG   When compared with ECG of 18-JUN-2024 18:44, (unconfirmed)   Premature ventricular complexes are no longer Present   Confirmed by Wm Campbell ([id number]) on 6/19/2024 2:47:31 PM      ECG 12 lead   Final Result by Wm Campbell DO (06/19 1447)   Age and gender specific ECG analysis    Sinus rhythm with Premature supraventricular complexes with occasional and    consecutive Premature ventricular complexes   Low voltage QRS   Abnormal ECG   When compared with ECG of 18-JUN-2024 17:54,      Confirmed by Wm Campbell ([id number]) on 6/19/2024 2:47:26 PM        GI:  No orders to display           Results from last 7 days   Lab Units 06/25/24  0914 06/22/24  0146 06/21/24  0434 06/19/24  0359 06/18/24  1835   WBC  Thousand/uL 10.76* 10.00 8.40 8.92 8.45   HEMOGLOBIN g/dL 8.5* 8.1* 7.9* 8.7* 8.9*   HEMATOCRIT % 29.2* 27.5* 26.4* 29.5* 30.7*   PLATELETS Thousands/uL 331 287 291 347 339   TOTAL NEUT ABS Thousands/µL 8.06* 7.79* 6.45  --  6.50     Results from last 7 days   Lab Units 06/22/24  0146   RETIC CT ABS  94,600   RETIC CT PCT % 2.62*     Results from last 7 days   Lab Units 06/24/24  1410 06/23/24  0556 06/22/24  1328 06/22/24  0146 06/21/24  0434 06/20/24  0436   SODIUM mmol/L 137 138 133* 135 138 134*   POTASSIUM mmol/L 3.4* 3.5 4.0 3.3* 3.4* 3.5   CHLORIDE mmol/L 98 101 98 97 101 99   CO2 mmol/L 26 26 26 26 25 24   ANION GAP mmol/L 13 11 9 12 12 11   BUN mg/dL 36* 42* 47* 51* 56* 63*   CREATININE mg/dL 1.76* 1.58* 1.65* 1.78* 1.90* 2.21*   EGFR ml/min/1.73sq m 41 47 45 41 38 31   CALCIUM mg/dL 7.0* 7.2* 7.6* 7.8* 7.6* 7.8*   MAGNESIUM mg/dL  --   --   --  2.2  --  2.5   PHOSPHORUS mg/dL  --   --   --  3.7  --   --      Results from last 7 days   Lab Units 06/22/24  0146   AST U/L 11*   ALT U/L 8   ALK PHOS U/L 94   TOTAL PROTEIN g/dL 6.5   ALBUMIN g/dL 3.8   TOTAL BILIRUBIN mg/dL 0.68     Results from last 7 days   Lab Units 06/25/24  1147 06/25/24  0752 06/24/24  2114 06/24/24  1649 06/24/24  1143 06/24/24  0747 06/23/24  2125 06/23/24  1657 06/23/24  1156 06/23/24  0755 06/22/24  2109 06/22/24  1657   POC GLUCOSE mg/dl 256* 134 156* 145* 212* 118 194* 151* 181* 143* 181* 150*     Results from last 7 days   Lab Units 06/24/24  1410 06/23/24  0556 06/22/24  1328 06/22/24  0146 06/21/24  0434 06/20/24  0436 06/19/24  0338 06/18/24  1835   GLUCOSE RANDOM mg/dL 205* 135 235* 198* 147* 172* 283* 223*         Results from last 7 days   Lab Units 06/19/24  0313   HEMOGLOBIN A1C % 8.3*   EAG mg/dl 192       Results from last 7 days   Lab Units 06/20/24  0438 06/20/24  0216 06/19/24  2351 06/19/24  0313 06/19/24  0058 06/18/24  2245   HS TNI 0HR ng/L  --   --  56*  --   --  58*   HS TNI 2HR ng/L  --  55*  --   --  57*  --     HSTNI D2 ng/L  --  -1  --   --  -1  --    HS TNI 4HR ng/L 56*  --   --  60*  --   --    HSTNI D4 ng/L 0  --   --  2  --   --              Results from last 7 days   Lab Units 06/18/24  1835   TSH 3RD GENERATON uIU/mL 2.896       Results from last 7 days   Lab Units 06/21/24  0434   FERRITIN ng/mL 10*   IRON SATURATION % 2*   IRON ug/dL 14*   TIBC ug/dL 687*         Medications:   Scheduled Medications:  apixaban, 5 mg, Oral, BID  atorvastatin, 10 mg, Oral, Daily  budesonide-formoterol, 2 puff, Inhalation, BID  bumetanide, 6 mg, Oral, BID  vitamin B-12, 1,000 mcg, Oral, Daily  Empagliflozin, 10 mg, Oral, Daily  folic acid 1 mg, thiamine (VITAMIN B1) 100 mg in sodium chloride 0.9 % 100 mL IV piggyback, , Intravenous, Daily  insulin glargine, 20 Units, Subcutaneous, HS  insulin lispro, 1-6 Units, Subcutaneous, HS  insulin lispro, 5 Units, Subcutaneous, TID With Meals  magnesium Oxide, 400 mg, Oral, BID  metoprolol succinate, 50 mg, Oral, Daily  pregabalin, 50 mg, Oral, HS  regadenoson, , ,   spironolactone, 25 mg, Oral, Daily  umeclidinium, 1 puff, Inhalation, Daily      Continuous IV Infusions: None     PRN Meds:  acetaminophen, 650 mg, Oral, Q6H PRN  albuterol, 2 puff, Inhalation, Q4H PRN  aluminum-magnesium hydroxide-simethicone, 30 mL, Oral, Q4H PRN  calcium carbonate, 500 mg, Oral, Daily PRN  nitroglycerin, 0.4 mg, Sublingual, Q5 Min PRN  regadenoson, , ,         Discharge Plan: D    Network Utilization Review Department  ATTENTION: Please call with any questions or concerns to [phone] and carefully listen to the prompts so that you are directed to the right person. All voicemails are confidential.   For Discharge needs, contact Care Management DC Support Team at [phone] opt. [phone]  Send all requests for admission clinical reviews, approved or denied determinations and any other requests to dedicated fax number below belonging to the campus where the patient is receiving treatment. List of dedicated fax  numbers for the Facilities:  FACILITY NAME UR FAX NUMBER   ADMISSION DENIALS (Administrative/Medical Necessity) [phone]   DISCHARGE SUPPORT TEAM (NETWORK) [phone]   PARENT CHILD HEALTH (Maternity/NICU/Pediatrics) [phone]   [facility] [phone]   [facility] [phone]   [facility] [phone]   [facility] [phone]   [facility] [phone]   [facility] [phone]   [facility] [phone]   [facility] [phone]   [facility] [phone]   [facility] [phone]   [facility] [phone]   [facility] [phone]

## 2024-06-25 NOTE — PHYSICAL THERAPY NOTE
PHYSICAL THERAPY NOTE          Patient Name: Mesfin Cano  Today's Date: 6/25/2024 06/25/24 1125   PT Last Visit   PT Visit Date 06/25/24   Note Type   Note Type Treatment   Pain Assessment   Pain Assessment Tool 0-10   Pain Score No Pain   Restrictions/Precautions   Weight Bearing Precautions Per Order No   Other Precautions Telemetry;Fall Risk   General   Chart Reviewed Yes   Response to Previous Treatment Patient with no complaints from previous session.   Family/Caregiver Present No   Cognition   Overall Cognitive Status WFL   Arousal/Participation Alert;Responsive;Arousable;Cooperative   Attention Within functional limits   Orientation Level Oriented X4   Memory Within functional limits   Following Commands Follows all commands and directions without difficulty   Subjective   Subjective pt pleasant and cooperative throughout therapy session. pt received seated at EOB   Bed Mobility   Supine to Sit Unable to assess   Sit to Supine Unable to assess   Transfers   Sit to Stand 5  Supervision   Additional items Increased time required;Verbal cues   Stand to Sit 5  Supervision   Additional items Increased time required;Verbal cues   Additional Comments transfers w/o AD   Ambulation/Elevation   Gait pattern Improper Weight shift;Wide ALLYSON;Decreased foot clearance   Gait Assistance 5  Supervision   Additional items Verbal cues   Assistive Device None   Distance 150'   Stair Management Assistance Not tested  (deferred)   Balance   Static Sitting Good   Dynamic Sitting Fair +   Static Standing Fair   Dynamic Standing Fair   Ambulatory Fair -   Endurance Deficit   Endurance Deficit Yes   Endurance Deficit Description generalized weakness, decreased exercise tolerance   Activity Tolerance   Activity Tolerance Patient limited by fatigue   Nurse Made Aware RN cleared and updated   Exercises   Hip Flexion Sitting;10 reps;AROM;Bilateral  "  Knee AROM Long Arc Quad Sitting;10 reps;AROM;Bilateral   Ankle Pumps Sitting;10 reps;AROM;Bilateral   Assessment   Prognosis Good   Problem List Decreased strength;Decreased endurance;Impaired balance;Decreased mobility   Assessment Patient was received seated at EOB . Patient was agreeable to therapy services today. PT session focused on gait today in order to improve functional mobility and independence. Functional mobility was performed as described above.  Pt was eager to participate in therapy services this date. Pt reports ambulating independently in hallways. Pt was able to transfer to standing and ambulate without any hands on assist this date, but required increased standing rest break due to rapid fatigue. Pt deferred stair trial this date stating that he can \"do it at home\". Pt was assisted back to EOB after ambulation and led through several seated exercises for BLE strengthening and to reduce risk of muscle atrophy.  Patient will benefit from continued PT services while in hospital in order to address remaining limitations. The patients AM-[facility] Basic Mobility Inpatient Short From Raw Score is 22 .  Based on AM-PAC scoring and patient presentation, PT currently recommending Level III (Minimum Resource Intensity). Please also refer to the recommendation of the Physical Therapist for safe discharge planning.   Barriers to Discharge Inaccessible home environment   Goals   Patient Goals to go home   STG Expiration Date 07/03/24   PT Treatment Day 1   Plan   Treatment/Interventions ADL retraining;Functional transfer training;LE strengthening/ROM;Elevations;Therapeutic exercise;Endurance training;Bed mobility;Gait training;Spoke to nursing;OT   Progress Progressing toward goals   PT Frequency 2-3x/wk   Discharge Recommendation   Rehab Resource Intensity Level, PT III (Minimum Resource Intensity)   AM-PAC Basic Mobility Inpatient   Turning in Flat Bed Without Bedrails 4   Lying on Back to Sitting on Edge of " Flat Bed Without Bedrails 4   Moving Bed to Chair 4   Standing Up From Chair Using Arms 4   Walk in Room 3   Climb 3-5 Stairs With Railing 3   Basic Mobility Inpatient Raw Score 22   Basic Mobility Standardized Score 47.4   Elian Washburn Highest Level Of Mobility   -HL Goal 7: Walk 25 feet or more   -HLM Achieved 7: Walk 25 feet or more   Education   Education Provided Mobility training   Patient Reinforcement needed   End of Consult   Patient Position at End of Consult Seated edge of bed;All needs within reach       Randy Church PT, DPT

## 2024-06-25 NOTE — ASSESSMENT & PLAN NOTE
Lab Results   Component Value Date    HGBA1C 8.3 (H) 06/19/2024       Recent Labs     06/24/24  1143 06/24/24  1649 06/24/24  2114 06/25/24  0752   POCGLU 212* 145* 156* 134         Blood Sugar Average: Last 72 hrs:  (P) [id number]  A1C 8.3, only on oral medications at home. Here is on basal/bolus insulin, continue for now  Recommend f/u PCP or endo referral at discharge

## 2024-06-25 NOTE — ASSESSMENT & PLAN NOTE
Lab Results   Component Value Date    EGFR 41 06/24/2024    EGFR 47 06/23/2024    EGFR 45 06/22/2024    CREATININE 1.76 (H) 06/24/2024    CREATININE 1.58 (H) 06/23/2024    CREATININE 1.65 (H) 06/22/2024     Baseline around 1.7 per record review  stable

## 2024-06-25 NOTE — ASSESSMENT & PLAN NOTE
Patient presented with 1 week history of shortness of breath and chest pain, on admission noted to have atrial flutter with heart rate in the 140s. Status post electrical cardioversion in the ED with return to NSR  Was not taking metoprolol for 2 days PTA due to feeling lightheaded/dizzy - suspect due to tachycardia induced hypotension worsened by BB  Normal TSH  Echo 6/20 EF 55%, akinetic basal inferolateral and mid inferolateral   Continue with metoprolol 50 mg daily  Continue with Eliquis 5 mg twice daily  Cardiology cleared for dc but as below stress test was ordered by ADEN WAGNER, f/u results given CP

## 2024-06-25 NOTE — ASSESSMENT & PLAN NOTE
Patient with a hx of asthma, had PFT 2021 showing severe reversible obstruction  He is currently using albuterol inh, symbicort and spiriva PTA  No increase in cough or sputum production, no upper respiratory sxs

## 2024-06-25 NOTE — ASSESSMENT & PLAN NOTE
Baseline appears 7-8. Due to eosinophilia in 2023 underwent BMB in December.  Per review, morphology evaluation, flow cytometry, cytogenetics, FISH studies and molecular studies all together did not demonstrate any evidence of a primary hematologic malignancy. Specifically pathologist thought that the elevated eosinophil count was secondary/reactive to the patient's medical issues per hematologist note  Was given IV iron this admission given hx of Samuel-en-Y gastric bypass  Monitor CBC  Consider outpatient EGD/Colon if warranted however no bleeding

## 2024-06-25 NOTE — ASSESSMENT & PLAN NOTE
Wt Readings from Last 3 Encounters:   06/25/24 (!) 149 kg (327 lb 8 oz)   05/24/24 (!) 150 kg (331 lb 9.6 oz)   02/20/24 (!) 151 kg (332 lb 12.8 oz)     Currently appears euvolemic, BPs low normal occasionally limiting diuretic therapy. Follows with HF outpatient. EF 55% as above this admission. At baseline is o n Bumex 6 BID and metolazone PRN with a baseline dry weight around 332 pounds.   Continue home dose diuretics  Monitor

## 2024-06-25 NOTE — PLAN OF CARE
"  Problem: PHYSICAL THERAPY ADULT  Goal: Performs mobility at highest level of function for planned discharge setting.  See evaluation for individualized goals.  Description:            See flowsheet documentation for full assessment, interventions and recommendations.  Outcome: Progressing  Note: Prognosis: Good  Problem List: Decreased strength, Decreased endurance, Impaired balance, Decreased mobility  Assessment: Patient was received seated at EOB . Patient was agreeable to therapy services today. PT session focused on gait today in order to improve functional mobility and independence. Functional mobility was performed as described above.  Pt was eager to participate in therapy services this date. Pt reports ambulating independently in hallways. Pt was able to transfer to standing and ambulate without any hands on assist this date, but required increased standing rest break due to rapid fatigue. Pt deferred stair trial this date stating that he can \"do it at home\". Pt was assisted back to EOB after ambulation and led through several seated exercises for BLE strengthening and to reduce risk of muscle atrophy.  Patient will benefit from continued PT services while in hospital in order to address remaining limitations. The patients AM-PAC Basic Mobility Inpatient Short From Raw Score is 22 .  Based on AM-PAC scoring and patient presentation, PT currently recommending Level III (Minimum Resource Intensity). Please also refer to the recommendation of the Physical Therapist for safe discharge planning.  Barriers to Discharge: Inaccessible home environment     Rehab Resource Intensity Level, PT: III (Minimum Resource Intensity)    See flowsheet documentation for full assessment.        "

## 2024-06-25 NOTE — ASSESSMENT & PLAN NOTE
Noted, had negative coronary angiography 2022  Cards signed off however SLIM MD ordered stress test on 6/24, thus will f/u results

## 2024-06-26 ENCOUNTER — TRANSITIONAL CARE MANAGEMENT (OUTPATIENT)
Dept: FAMILY MEDICINE CLINIC | Facility: CLINIC | Age: 59
End: 2024-06-26

## 2024-06-26 VITALS
HEART RATE: 78 BPM | DIASTOLIC BLOOD PRESSURE: 47 MMHG | SYSTOLIC BLOOD PRESSURE: 101 MMHG | BODY MASS INDEX: 41.75 KG/M2 | RESPIRATION RATE: 17 BRPM | HEIGHT: 73 IN | WEIGHT: 315 LBS | TEMPERATURE: 97.6 F | OXYGEN SATURATION: 99 %

## 2024-06-26 PROBLEM — R94.39 ABNORMAL STRESS TEST: Status: ACTIVE | Noted: 2024-06-26

## 2024-06-26 LAB
ANION GAP SERPL CALCULATED.3IONS-SCNC: 8 MMOL/L (ref 4–13)
BASOPHILS # BLD AUTO: 0.05 THOUSANDS/ÂΜL (ref 0–0.1)
BASOPHILS NFR BLD AUTO: 1 % (ref 0–1)
BUN SERPL-MCNC: 25 MG/DL (ref 5–25)
CALCIUM SERPL-MCNC: 7 MG/DL (ref 8.4–10.2)
CHLORIDE SERPL-SCNC: 102 MMOL/L (ref 96–108)
CO2 SERPL-SCNC: 29 MMOL/L (ref 21–32)
CREAT SERPL-MCNC: 1.48 MG/DL (ref 0.6–1.3)
EOSINOPHIL # BLD AUTO: 0.42 THOUSAND/ÂΜL (ref 0–0.61)
EOSINOPHIL NFR BLD AUTO: 5 % (ref 0–6)
ERYTHROCYTE [DISTWIDTH] IN BLOOD BY AUTOMATED COUNT: 17.8 % (ref 11.6–15.1)
GFR SERPL CREATININE-BSD FRML MDRD: 51 ML/MIN/1.73SQ M
GLUCOSE SERPL-MCNC: 146 MG/DL (ref 65–140)
GLUCOSE SERPL-MCNC: 156 MG/DL (ref 65–140)
GLUCOSE SERPL-MCNC: 304 MG/DL (ref 65–140)
HCT VFR BLD AUTO: 25.9 % (ref 36.5–49.3)
HGB BLD-MCNC: 7.5 G/DL (ref 12–17)
IMM GRANULOCYTES # BLD AUTO: 0.14 THOUSAND/UL (ref 0–0.2)
IMM GRANULOCYTES NFR BLD AUTO: 2 % (ref 0–2)
LYMPHOCYTES # BLD AUTO: 1.06 THOUSANDS/ÂΜL (ref 0.6–4.47)
LYMPHOCYTES NFR BLD AUTO: 11 % (ref 14–44)
MCH RBC QN AUTO: 22.7 PG (ref 26.8–34.3)
MCHC RBC AUTO-ENTMCNC: 29 G/DL (ref 31.4–37.4)
MCV RBC AUTO: 78 FL (ref 82–98)
MONOCYTES # BLD AUTO: 0.86 THOUSAND/ÂΜL (ref 0.17–1.22)
MONOCYTES NFR BLD AUTO: 9 % (ref 4–12)
NEUTROPHILS # BLD AUTO: 6.79 THOUSANDS/ÂΜL (ref 1.85–7.62)
NEUTS SEG NFR BLD AUTO: 72 % (ref 43–75)
NRBC BLD AUTO-RTO: 0 /100 WBCS
PLATELET # BLD AUTO: 282 THOUSANDS/UL (ref 149–390)
PMV BLD AUTO: 11.1 FL (ref 8.9–12.7)
POTASSIUM SERPL-SCNC: 3.4 MMOL/L (ref 3.5–5.3)
RBC # BLD AUTO: 3.31 MILLION/UL (ref 3.88–5.62)
SODIUM SERPL-SCNC: 139 MMOL/L (ref 135–147)
WBC # BLD AUTO: 9.32 THOUSAND/UL (ref 4.31–10.16)

## 2024-06-26 PROCEDURE — NC001 PR NO CHARGE: Performed by: INTERNAL MEDICINE

## 2024-06-26 PROCEDURE — 99232 SBSQ HOSP IP/OBS MODERATE 35: CPT | Performed by: INTERNAL MEDICINE

## 2024-06-26 PROCEDURE — 99239 HOSP IP/OBS DSCHRG MGMT >30: CPT | Performed by: INTERNAL MEDICINE

## 2024-06-26 PROCEDURE — 85025 COMPLETE CBC W/AUTO DIFF WBC: CPT | Performed by: INTERNAL MEDICINE

## 2024-06-26 PROCEDURE — 80048 BASIC METABOLIC PNL TOTAL CA: CPT | Performed by: INTERNAL MEDICINE

## 2024-06-26 PROCEDURE — 82948 REAGENT STRIP/BLOOD GLUCOSE: CPT

## 2024-06-26 RX ADMIN — APIXABAN 5 MG: 5 TABLET, FILM COATED ORAL at 09:47

## 2024-06-26 RX ADMIN — EMPAGLIFLOZIN 10 MG: 10 TABLET, FILM COATED ORAL at 09:47

## 2024-06-26 RX ADMIN — BUMETANIDE 6 MG: 2 TABLET ORAL at 08:45

## 2024-06-26 RX ADMIN — BUDESONIDE AND FORMOTEROL FUMARATE DIHYDRATE 2 PUFF: 160; 4.5 AEROSOL RESPIRATORY (INHALATION) at 08:46

## 2024-06-26 RX ADMIN — METOPROLOL SUCCINATE 50 MG: 50 TABLET, EXTENDED RELEASE ORAL at 08:46

## 2024-06-26 RX ADMIN — MAGNESIUM OXIDE TAB 400 MG (241.3 MG ELEMENTAL MG) 400 MG: 400 (241.3 MG) TAB at 08:46

## 2024-06-26 RX ADMIN — SPIRONOLACTONE 25 MG: 25 TABLET ORAL at 08:46

## 2024-06-26 RX ADMIN — THIAMINE HYDROCHLORIDE: 100 INJECTION, SOLUTION INTRAMUSCULAR; INTRAVENOUS at 09:47

## 2024-06-26 RX ADMIN — INSULIN LISPRO 5 UNITS: 100 INJECTION, SOLUTION INTRAVENOUS; SUBCUTANEOUS at 12:28

## 2024-06-26 RX ADMIN — UMECLIDINIUM 1 PUFF: 62.5 AEROSOL, POWDER ORAL at 08:46

## 2024-06-26 RX ADMIN — CYANOCOBALAMIN TAB 500 MCG 1000 MCG: 500 TAB at 08:45

## 2024-06-26 NOTE — ASSESSMENT & PLAN NOTE
Noted, had negative coronary angiography 2022  Cards signed off however ADEN WAGNER ordered stress test on 6/24 which was abnormal, d/w cardiology, no need for cath as stress similar to prior--recommending outpatient coronary CTA

## 2024-06-26 NOTE — PROGRESS NOTES
"Cardiology Progress Note - Mesfin Cano 59 y.o. male MRN: [id number]    Unit/Bed#: -01 Encounter: [id number]      Assessment/Plan:  Uncontrolled atrial flutter on admission/stopped BB due to lightheadedness- now controlled on Toprol XL 50 mg daily and Eliquis 5 mg BID,  EF 55% akinetic basal inferolateral and mid inferolateral   TSHnormal   Abnormal stress test-  No significant changes stress test as compared to stress in 9/22.  Patient had Cardiac cath on 9/6/22 -  No obstructive CAD     Recommend: Outpt Coronary CTA as outpt    Chronic stable HFpEF-  euvolemic  dry wt 332   current wt 328.  Continue diuretic regimen ( Lasix 40 mg daily, Aldactone 25 mg daily)    CKD stage 3 -  CR 1.48 GFR 51  Baseline (CR 1.58)  Anemia secondary to CKD/ Hx Samuel-en Y gastric bypass  - no obversed GI bleeding. Received IV iron during stay, Iron sulfate and foilc acid and B1  DM type 2 -  primary team managing  on [place]   Asthma- continue inhalers  VICKY  on CPAP but not using machine due to recall .  Last sleep study 2019 Needs repeat sleep study to qualify for new machine        Subjective:   Patient seen and examined.  No significant events overnight. Denies chest pain, pnd, orthopnea, denies abdominal pain, nausea    Objective:     Vitals: Blood pressure 113/53, pulse 76, temperature 97.5 °F (36.4 °C), resp. rate 17, height 6' 1\" (1.854 m), weight (!) 149 kg (328 lb 3.2 oz), SpO2 96%., Body mass index is 43.3 kg/m².,   Orthostatic Blood Pressures      Flowsheet Row Most Recent Value   Blood Pressure 113/53 filed at 06/26/2024 0330   Patient Position - Orthostatic VS Lying filed at 06/26/2024 0330              Intake/Output Summary (Last 24 hours) at 6/26/2024 0932  Last data filed at 6/26/2024 0600  Gross per 24 hour   Intake 1020 ml   Output 2025 ml   Net -1005 ml       No significant arrhythmias seen on telemetry review. NSR no significant arrhythmias    Physical Exam:    Physical Exam  Constitutional:       Appearance: " "Normal appearance. He is obese.   HENT:      Mouth/Throat:      Mouth: Mucous membranes are moist.   Cardiovascular:      Rate and Rhythm: Normal rate. Rhythm irregular.      Heart sounds: Normal heart sounds.   Pulmonary:      Effort: Pulmonary effort is normal.      Breath sounds: Normal breath sounds.   Abdominal:      General: Bowel sounds are normal.      Palpations: Abdomen is soft.   Musculoskeletal:      Right lower leg: Edema present.      Left lower leg: Edema present.   Skin:     General: Skin is warm and dry.      Capillary Refill: Capillary refill takes more than 3 seconds.   Neurological:      Mental Status: He is alert and oriented to person, place, and time.   Psychiatric:         Behavior: Behavior normal.      @[email or web address]@   Labs & Results:    Lab Results   Component Value Date    TROPONINI <0.02 09/22/2021    TROPONINI <0.02 07/19/2021    TROPONINI <0.02 07/19/2021       Lab Results   Component Value Date    GLUCOSE 260 (H) 02/07/2024    CALCIUM 7.0 (L) 06/26/2024    K 3.4 (L) 06/26/2024    CO2 29 06/26/2024     06/26/2024    BUN 25 06/26/2024    CREATININE 1.48 (H) 06/26/2024       Lab Results   Component Value Date    WBC 9.32 06/26/2024    HGB 7.5 (L) 06/26/2024    HCT 25.9 (L) 06/26/2024    MCV 78 (L) 06/26/2024     06/26/2024           No results found for: \"CHOL\"  Lab Results   Component Value Date    HDL 55 10/06/2023    HDL 55 11/22/2022     Lab Results   Component Value Date    LDLCALC 86 10/06/2023    LDLCALC 85 11/22/2022     Lab Results   Component Value Date    TRIG 146 10/06/2023    TRIG 127 11/22/2022       Lab Results   Component Value Date    ALT 8 06/22/2024    AST 11 (L) 06/22/2024    ALKPHOS 94 06/22/2024         Tele:   atrial flutter with controlled rate             "

## 2024-06-26 NOTE — ASSESSMENT & PLAN NOTE
"Baseline appears 7-8. Due to eosinophilia in 2023 underwent BMB in December.  Per review: \"morphology evaluation, flow cytometry, cytogenetics, FISH studies and molecular studies all together did not demonstrate any evidence of a primary hematologic malignancy. Specifically pathologist thought that the elevated eosinophil count was secondary/reactive to the patient's medical issues\" per hematologist note  Was given IV iron this admission given hx of Samuel-en-Y gastric bypass  Monitor CBC  Consider outpatient EGD/Colon if warranted however no bleeding reported  "

## 2024-06-26 NOTE — PLAN OF CARE
Problem: PAIN - ADULT  Goal: Verbalizes/displays adequate comfort level or baseline comfort level  Description: Interventions:  - Encourage patient to monitor pain and request assistance  - Assess pain using appropriate pain scale  - Administer analgesics based on type and severity of pain and evaluate response  - Implement non-pharmacological measures as appropriate and evaluate response  - Consider cultural and social influences on pain and pain management  - Notify physician/advanced practitioner if interventions unsuccessful or patient reports new pain  Outcome: Progressing     Problem: INFECTION - ADULT  Goal: Absence or prevention of progression during hospitalization  Description: INTERVENTIONS:  - Assess and monitor for signs and symptoms of infection  - Monitor lab/diagnostic results  - Monitor all insertion sites, i.e. indwelling lines, tubes, and drains  - Monitor endotracheal if appropriate and nasal secretions for changes in amount and color  - [place] appropriate cooling/warming therapies per order  - Administer medications as ordered  - Instruct and encourage patient and family to use good hand hygiene technique  - Identify and instruct in appropriate isolation precautions for identified infection/condition  Outcome: Progressing

## 2024-06-26 NOTE — ASSESSMENT & PLAN NOTE
Lab Results   Component Value Date    EGFR 51 06/26/2024    EGFR 41 06/24/2024    EGFR 47 06/23/2024    CREATININE 1.48 (H) 06/26/2024    CREATININE 1.76 (H) 06/24/2024    CREATININE 1.58 (H) 06/23/2024     Baseline around 1.7 per record review  Stable currently

## 2024-06-26 NOTE — ASSESSMENT & PLAN NOTE
Patient presented with 1 week history of shortness of breath and chest pain, on admission noted to have atrial flutter with heart rate in the 140s. Status post electrical cardioversion in the ED with return to NSR  Was not taking metoprolol for 2 days PTA due to feeling lightheaded/dizzy - suspect due to tachycardia induced hypotension worsened by BB  Normal TSH  Echo 6/20 EF 55%, akinetic basal inferolateral and mid inferolateral   Continue with metoprolol 50 mg daily  Continue with Eliquis 5 mg twice daily

## 2024-06-26 NOTE — DISCHARGE SUMMARY
[facility]  Discharge- Mesfin Cano 1965, 59 y.o. male MRN: [id number]  Unit/Bed#: [facility]-01 Encounter: [id number]  Primary Care Provider: Soo Chavez MD   Date and time admitted to hospital: 6/18/2024  5:49 PM    * Uncontrolled atrial flutter (HCC)  Assessment & Plan  Patient presented with 1 week history of shortness of breath and chest pain, on admission noted to have atrial flutter with heart rate in the 140s. Status post electrical cardioversion in the ED with return to NSR  Was not taking metoprolol for 2 days PTA due to feeling lightheaded/dizzy - suspect due to tachycardia induced hypotension worsened by BB  Normal TSH  Echo 6/20 EF 55%, akinetic basal inferolateral and mid inferolateral   Continue with metoprolol 50 mg daily  Continue with Eliquis 5 mg twice daily          Abnormal stress test  Assessment & Plan  Noted and seen by cardiology. Recommending outpatient coronary CTA. No need for cath    Chest pain  Assessment & Plan  Noted, had negative coronary angiography 2022  Cards signed off however ADEN WAGNER ordered stress test on 6/24 which was abnormal, d/w cardiology, no need for cath as stress similar to prior--recommending outpatient coronary CTA     Chronic diastolic CHF (HCC)  Assessment & Plan  Wt Readings from Last 3 Encounters:   06/26/24 (!) 149 kg (328 lb 3.2 oz)   05/24/24 (!) 150 kg (331 lb 9.6 oz)   02/20/24 (!) 151 kg (332 lb 12.8 oz)     Currently appears euvolemic, BPs low normal occasionally limiting diuretic therapy. Follows with HF outpatient. EF 55% as above this admission. At baseline is o n Bumex 6 BID and metolazone PRN with a baseline dry weight around 332 pounds.   Continue home dose diuretics  Monitor             CKD (chronic kidney disease) stage 3 (HCC)  Assessment & Plan  Lab Results   Component Value Date    EGFR 51 06/26/2024    EGFR 41 06/24/2024    EGFR 47 06/23/2024    CREATININE 1.48 (H) 06/26/2024    CREATININE 1.76  "(H) 06/24/2024    CREATININE 1.58 (H) 06/23/2024     Baseline around 1.7 per record review  Stable currently    Morbid obesity (HCC)  Assessment & Plan  Weight loss recommended   Needs outpatient sleep study, messaged PCP    Moderate persistent asthma without complication  Assessment & Plan  Patient with a hx of asthma, had PFT 2021 showing severe reversible obstruction  He is currently using albuterol inh, symbicort and spiriva PTA  No increase in cough or sputum production, no upper respiratory sxs    Anemia due to chronic kidney disease  Assessment & Plan  Baseline appears 7-8. Due to eosinophilia in 2023 underwent BMB in December.  Per review: \"morphology evaluation, flow cytometry, cytogenetics, FISH studies and molecular studies all together did not demonstrate any evidence of a primary hematologic malignancy. Specifically pathologist thought that the elevated eosinophil count was secondary/reactive to the patient's medical issues\" per hematologist note  Was given IV iron this admission given hx of Samuel-en-Y gastric bypass  Monitor CBC  Consider outpatient EGD/Colon if warranted however no bleeding reported    Diabetic polyneuropathy associated with type 2 diabetes mellitus (HCC)  Assessment & Plan  Lab Results   Component Value Date    HGBA1C 8.3 (H) 06/19/2024       Recent Labs     06/25/24  0752 06/25/24  1147 06/25/24  1652 06/25/24  2126   POCGLU 134 256* 190* 175*         Blood Sugar Average: Last 72 hrs:  (P) 171.25  A1C 8.3, only on oral medications at home. Here is on basal/bolus insulin, continue for now  Recommend f/u PCP or endo referral at discharge         Medical Problems       Resolved Problems  Date Reviewed: 6/26/2024   None       Discharging Physician / Practitioner: Mattie Iglesias PA-C  PCP: Soo Chavez MD  Admission Date:   Admission Orders (From admission, onward)       Ordered        06/20/24 1254  INPATIENT ADMISSION  Once            06/18/24 2158  Place in Observation  Once      "                     Discharge Date: 06/26/24    Consultations During Hospital Stay:  Cardiology    Procedures Performed:   Nuclear stress test 6/25:Medium to large size partially reversible inferior/inferolateral perfusion defect concerning for inferior ischemia with inferolateral infarct v diaphragmatic attenuation artifact.  Prone imaging could not be performed to further clarify this defect.  Clinical correlation is suggested.  Echo 6/20:   Left Ventricle: Left ventricular cavity size is normal. Wall thickness is mildly increased. There is mild concentric hypertrophy. The left ventricular ejection fraction is 55%. Systolic function is normal.    The following segments are akinetic: basal inferolateral and mid inferolateral.    All other segments are normal.    Left Atrium: The atrium is moderately dilated (42-48 mL/m2).    Right Atrium: The atrium is mildly dilated.  CXR 6/19: no acute disease    Significant Findings / Test Results:   See above     Incidental Findings:   See above      Test Results Pending at Discharge (will require follow up):   None     Outpatient Tests Requested:  Needs outpatient sleep study  Needs outpatient coronary CTA    Complications:  none    Reason for Admission: Chest pain and shortness of breath    Hospital Course:   Mesfin Cano is a 59 y.o. male patient with past medical history as above who originally presented to the hospital on 6/18/2024 due to 1 week history of chest pain and shortness of breath.  In the ER he was noted to have uncontrolled A-fib/flutter.  Underwent cardioversion after which symptoms resolved.  Patient continued with chest pain and stress test was performed which was abnormal as above, however cardiology feels similar compared to prior and recommending outpatient CTA.  Was euvolemic while admitted.  Needs updated sleep study as an outpatient.  Please see assessment and plan above for specifics.    Please see above list of diagnoses and related plan for  "additional information.     Condition at Discharge: stable    Discharge Day Visit / Exam:   Subjective: Doing well today.  Denies any chest pain or shortness of breath.  Was updated by cardiology team regarding findings on his stress test and the plan for outpatient follow-up.   Vitals: Blood Pressure: 113/53 (06/26/24 0330)  Pulse: 78 (06/26/24 1113)  Temperature: 97.6 °F (36.4 °C) (06/26/24 1113)  Temp Source: Oral (06/26/24 0330)  Respirations: 17 (06/26/24 0330)  Height: 6' 1\" (185.4 cm) (06/25/24 1335)  Weight - Scale: (!) 149 kg (328 lb 3.2 oz) (06/26/24 0546)  SpO2: 99 % (06/26/24 1113)  Exam:   Physical Exam  Vitals and nursing note reviewed.   Constitutional:       Appearance: He is obese.      Comments: On room air   Cardiovascular:      Rate and Rhythm: Normal rate. Rhythm irregular.   Pulmonary:      Effort: No respiratory distress.   Abdominal:      General: Bowel sounds are normal. There is no distension.      Palpations: Abdomen is soft.   Musculoskeletal:         General: Swelling present.      Right lower leg: No edema.      Left lower leg: No edema.   Skin:     Coloration: Skin is pale.   Neurological:      Mental Status: He is alert and oriented to person, place, and time.   Psychiatric:         Mood and Affect: Mood normal.          Discussion with Family: Patient declined call to .     Discharge instructions/Information to patient and family:   See after visit summary for information provided to patient and family.      Provisions for Follow-Up Care:  See after visit summary for information related to follow-up care and any pertinent home health orders.      Mobility at time of Discharge:   Basic Mobility Inpatient Raw Score: 23  JH-HLM Goal: 7: Walk 25 feet or more  JH-HLM Achieved: 7: Walk 25 feet or more  HLM Goal achieved. Continue to encourage appropriate mobility.     Disposition:   Home    Planned Readmission: no     Discharge Statement:  I spent 34 minutes discharging the " patient. This time was spent on the day of discharge. I had direct contact with the patient on the day of discharge. Greater than 50% of the total time was spent examining patient, answering all patient questions, arranging and discussing plan of care with patient as well as directly providing post-discharge instructions.  Additional time then spent on discharge activities.    Discharge Medications:  See after visit summary for reconciled discharge medications provided to patient and/or family.      **Please Note: This note may have been constructed using a voice recognition system**

## 2024-06-26 NOTE — CASE MANAGEMENT
Case Management Discharge Planning Note    Patient name Mesfin Cano  Location /-01 MRN [id number]  : 1965 Date 2024       Current Admission Date: 2024  Current Admission Diagnosis:Uncontrolled atrial flutter (HCC)   Patient Active Problem List    Diagnosis Date Noted Date Diagnosed    Abnormal stress test 2024     Morbid obesity (HCC) 2024     Chest pain 2024     Uncontrolled atrial flutter (HCC) 2024     Moderate persistent asthma without complication 2024     Status post cholecystectomy 2024     Acute gallstone pancreatitis 2024     Diabetic neuropathy (HCC) 2023     Eosinophilia 10/18/2023     Anemia due to chronic kidney disease 10/16/2023     Nasal congestion 10/06/2023     Loose stools 2023     Chronic diastolic CHF (HCC) 04/10/2023     Diabetic polyneuropathy associated with type 2 diabetes mellitus (HCC) 2022     CKD (chronic kidney disease) stage 3 (HCC) 2022     Presence of CardioMEMS HF system 2022     Paroxysmal atrial fibrillation (HCC) 2022     Severe persistent asthma 10/11/2021     HNP (herniated nucleus pulposus), lumbar 2021     Foraminal stenosis of lumbar region 2021     VICKY (obstructive sleep apnea)      Hyperlipidemia 2019     Primary osteoarthritis of both knees 2018     Irritable bowel syndrome with diarrhea 2018     Primary hypertension 2018     Vitamin B12 deficiency 2016     Vitamin D deficiency 2016     Morbid obesity (HCC) 2016     Primary osteoarthritis of right knee 10/15/2013       LOS (days): 6  Geometric Mean LOS (GMLOS) (days): 2.3  Days to GMLOS:-3.7     OBJECTIVE:  Risk of Unplanned Readmission Score: 32.38         Current admission status: Inpatient   Preferred Pharmacy:   [id number] - [place], [place] [address]  [place] [address]  Phone: [phone] Fax: [phone]    Primary  Care Provider: Soo Chavez MD    Primary Insurance: BLUE CROSS  Secondary Insurance:     DISCHARGE DETAILS:    Discharge planning discussed with:: Pt  Freedom of Choice: Yes  Comments - Freedom of Choice: Discussed FOC  CM contacted family/caregiver?: No- see comments (Pt alert and oriented)                            Other Referral/Resources/Interventions Provided:  Financial Resources Provided: Indigent Transportation  Interventions: Transportation  Referral Comments: Pt is medically stable for d/c, he required transportation to return home. Pt stated that he utilized shuttle service via  in the past, CM reached out to [employer] dispatcher, who asked CM to submit request via [employer]. CM requested transportation by [employer] for 1330. Pt and nursing aware.Transport waiver signed.                Transport at Discharge : Ride Share        Transported by (Company and Unit #): [employer]  ETA of Transport (Date): 06/26/24  ETA of Transport (Time): 1330

## 2024-06-26 NOTE — ASSESSMENT & PLAN NOTE
Lab Results   Component Value Date    HGBA1C 8.3 (H) 06/19/2024       Recent Labs     06/25/24  0752 06/25/24  1147 06/25/24  1652 06/25/24 2126   POCGLU 134 256* 190* 175*         Blood Sugar Average: Last 72 hrs:  (P) 171.25  A1C 8.3, only on oral medications at home. Here is on basal/bolus insulin, continue for now  Recommend f/u PCP or endo referral at discharge

## 2024-06-26 NOTE — ASSESSMENT & PLAN NOTE
Wt Readings from Last 3 Encounters:   06/26/24 (!) 149 kg (328 lb 3.2 oz)   05/24/24 (!) 150 kg (331 lb 9.6 oz)   02/20/24 (!) 151 kg (332 lb 12.8 oz)     Currently appears euvolemic, BPs low normal occasionally limiting diuretic therapy. Follows with HF outpatient. EF 55% as above this admission. At baseline is o n Bumex 6 BID and metolazone PRN with a baseline dry weight around 332 pounds.   Continue home dose diuretics  Monitor

## 2024-06-27 ENCOUNTER — HOSPITAL ENCOUNTER (EMERGENCY)
Facility: HOSPITAL | Age: 59
Discharge: HOME | End: 2024-06-27
Attending: EMERGENCY MEDICINE
Payer: COMMERCIAL

## 2024-06-27 ENCOUNTER — APPOINTMENT (EMERGENCY)
Dept: RADIOLOGY | Facility: HOSPITAL | Age: 59
End: 2024-06-27
Payer: COMMERCIAL

## 2024-06-27 VITALS
OXYGEN SATURATION: 96 % | DIASTOLIC BLOOD PRESSURE: 59 MMHG | HEART RATE: 88 BPM | SYSTOLIC BLOOD PRESSURE: 124 MMHG | TEMPERATURE: 97.2 F | RESPIRATION RATE: 16 BRPM

## 2024-06-27 DIAGNOSIS — R07.9 CHEST PAIN: ICD-10-CM

## 2024-06-27 DIAGNOSIS — R07.9 CHEST PAIN, UNSPECIFIED: Primary | ICD-10-CM

## 2024-06-27 LAB
2HR DELTA HS TROPONIN: 0 NG/L
ALBUMIN SERPL BCG-MCNC: 3.4 G/DL (ref 3.5–5)
ALP SERPL-CCNC: 87 U/L (ref 34–104)
ALT SERPL W P-5'-P-CCNC: 9 U/L (ref 7–52)
ANION GAP SERPL CALCULATED.3IONS-SCNC: 14 MMOL/L (ref 4–13)
AST SERPL W P-5'-P-CCNC: 13 U/L (ref 13–39)
ATRIAL RATE: 76 BPM
ATRIAL RATE: 76 BPM
BASOPHILS # BLD AUTO: 0.05 THOUSANDS/ÂΜL (ref 0–0.1)
BASOPHILS NFR BLD AUTO: 1 % (ref 0–1)
BILIRUB SERPL-MCNC: 0.31 MG/DL (ref 0.2–1)
BUN SERPL-MCNC: 18 MG/DL (ref 5–25)
CALCIUM ALBUM COR SERPL-MCNC: 8 MG/DL (ref 8.3–10.1)
CALCIUM SERPL-MCNC: 7.5 MG/DL (ref 8.4–10.2)
CARDIAC TROPONIN I PNL SERPL HS: 23 NG/L
CARDIAC TROPONIN I PNL SERPL HS: 23 NG/L
CHLORIDE SERPL-SCNC: 102 MMOL/L (ref 96–108)
CO2 SERPL-SCNC: 21 MMOL/L (ref 21–32)
CREAT SERPL-MCNC: 1.28 MG/DL (ref 0.6–1.3)
EOSINOPHIL # BLD AUTO: 0.46 THOUSAND/ÂΜL (ref 0–0.61)
EOSINOPHIL NFR BLD AUTO: 5 % (ref 0–6)
ERYTHROCYTE [DISTWIDTH] IN BLOOD BY AUTOMATED COUNT: 18.9 % (ref 11.6–15.1)
GFR SERPL CREATININE-BSD FRML MDRD: 61 ML/MIN/1.73SQ M
GLUCOSE SERPL-MCNC: 206 MG/DL (ref 65–140)
HCT VFR BLD AUTO: 28 % (ref 36.5–49.3)
HGB BLD-MCNC: 8.2 G/DL (ref 12–17)
IMM GRANULOCYTES # BLD AUTO: 0.17 THOUSAND/UL (ref 0–0.2)
IMM GRANULOCYTES NFR BLD AUTO: 2 % (ref 0–2)
LYMPHOCYTES # BLD AUTO: 1.22 THOUSANDS/ÂΜL (ref 0.6–4.47)
LYMPHOCYTES NFR BLD AUTO: 14 % (ref 14–44)
MCH RBC QN AUTO: 23 PG (ref 26.8–34.3)
MCHC RBC AUTO-ENTMCNC: 29.3 G/DL (ref 31.4–37.4)
MCV RBC AUTO: 79 FL (ref 82–98)
MONOCYTES # BLD AUTO: 0.68 THOUSAND/ÂΜL (ref 0.17–1.22)
MONOCYTES NFR BLD AUTO: 8 % (ref 4–12)
NEUTROPHILS # BLD AUTO: 6.06 THOUSANDS/ÂΜL (ref 1.85–7.62)
NEUTS SEG NFR BLD AUTO: 70 % (ref 43–75)
NRBC BLD AUTO-RTO: 0 /100 WBCS
P AXIS: 0 DEGREES
P AXIS: 54 DEGREES
PLATELET # BLD AUTO: 299 THOUSANDS/UL (ref 149–390)
PMV BLD AUTO: 10.1 FL (ref 8.9–12.7)
POTASSIUM SERPL-SCNC: 3.1 MMOL/L (ref 3.5–5.3)
PR INTERVAL: 134 MS
PR INTERVAL: 142 MS
PROT SERPL-MCNC: 6.1 G/DL (ref 6.4–8.4)
QRS AXIS: 79 DEGREES
QRS AXIS: 85 DEGREES
QRSD INTERVAL: 82 MS
QRSD INTERVAL: 84 MS
QT INTERVAL: 426 MS
QT INTERVAL: 432 MS
QTC INTERVAL: 479 MS
QTC INTERVAL: 486 MS
RBC # BLD AUTO: 3.56 MILLION/UL (ref 3.88–5.62)
SODIUM SERPL-SCNC: 137 MMOL/L (ref 135–147)
T WAVE AXIS: 19 DEGREES
T WAVE AXIS: 54 DEGREES
VENTRICULAR RATE: 76 BPM
VENTRICULAR RATE: 76 BPM
WBC # BLD AUTO: 8.64 THOUSAND/UL (ref 4.31–10.16)

## 2024-06-27 PROCEDURE — 36415 COLL VENOUS BLD VENIPUNCTURE: CPT

## 2024-06-27 PROCEDURE — 84484 ASSAY OF TROPONIN QUANT: CPT | Performed by: EMERGENCY MEDICINE

## 2024-06-27 PROCEDURE — 96374 THER/PROPH/DIAG INJ IV PUSH: CPT

## 2024-06-27 PROCEDURE — 99285 EMERGENCY DEPT VISIT HI MDM: CPT

## 2024-06-27 PROCEDURE — 93010 ELECTROCARDIOGRAM REPORT: CPT | Performed by: INTERNAL MEDICINE

## 2024-06-27 PROCEDURE — 96375 TX/PRO/DX INJ NEW DRUG ADDON: CPT

## 2024-06-27 PROCEDURE — 71045 X-RAY EXAM CHEST 1 VIEW: CPT

## 2024-06-27 PROCEDURE — 80053 COMPREHEN METABOLIC PANEL: CPT | Performed by: EMERGENCY MEDICINE

## 2024-06-27 PROCEDURE — 93005 ELECTROCARDIOGRAM TRACING: CPT

## 2024-06-27 PROCEDURE — 99285 EMERGENCY DEPT VISIT HI MDM: CPT | Performed by: EMERGENCY MEDICINE

## 2024-06-27 PROCEDURE — 85025 COMPLETE CBC W/AUTO DIFF WBC: CPT | Performed by: EMERGENCY MEDICINE

## 2024-06-27 RX ORDER — SODIUM CHLORIDE, SODIUM GLUCONATE, SODIUM ACETATE, POTASSIUM CHLORIDE, MAGNESIUM CHLORIDE, SODIUM PHOSPHATE, DIBASIC, AND POTASSIUM PHOSPHATE .53; .5; .37; .037; .03; .012; .00082 G/100ML; G/100ML; G/100ML; G/100ML; G/100ML; G/100ML; G/100ML
1000 INJECTION, SOLUTION INTRAVENOUS ONCE
Status: DISCONTINUED | OUTPATIENT
Start: 2024-06-27 | End: 2024-06-27

## 2024-06-27 RX ORDER — NITROGLYCERIN 0.4 MG/1
0.4 TABLET SUBLINGUAL ONCE
Status: COMPLETED | OUTPATIENT
Start: 2024-06-27 | End: 2024-06-27

## 2024-06-27 RX ORDER — DROPERIDOL 2.5 MG/ML
0.62 INJECTION, SOLUTION INTRAMUSCULAR; INTRAVENOUS ONCE
Status: COMPLETED | OUTPATIENT
Start: 2024-06-27 | End: 2024-06-27

## 2024-06-27 RX ORDER — FENTANYL CITRATE 50 UG/ML
50 INJECTION, SOLUTION INTRAMUSCULAR; INTRAVENOUS ONCE
Status: COMPLETED | OUTPATIENT
Start: 2024-06-27 | End: 2024-06-27

## 2024-06-27 RX ORDER — MAGNESIUM HYDROXIDE/ALUMINUM HYDROXICE/SIMETHICONE 120; 1200; 1200 MG/30ML; MG/30ML; MG/30ML
30 SUSPENSION ORAL ONCE
Status: COMPLETED | OUTPATIENT
Start: 2024-06-27 | End: 2024-06-27

## 2024-06-27 RX ORDER — NITROGLYCERIN 0.4 MG/1
0.4 TABLET SUBLINGUAL
Qty: 50 TABLET | Refills: 0 | Status: SHIPPED | OUTPATIENT
Start: 2024-06-27

## 2024-06-27 RX ORDER — FAMOTIDINE 10 MG/ML
20 INJECTION, SOLUTION INTRAVENOUS ONCE
Status: DISCONTINUED | OUTPATIENT
Start: 2024-06-27 | End: 2024-06-27 | Stop reason: HOSPADM

## 2024-06-27 RX ORDER — MORPHINE SULFATE 4 MG/ML
4 INJECTION, SOLUTION INTRAMUSCULAR; INTRAVENOUS ONCE
Status: DISCONTINUED | OUTPATIENT
Start: 2024-06-27 | End: 2024-06-27 | Stop reason: HOSPADM

## 2024-06-27 RX ORDER — FAMOTIDINE 20 MG/1
20 TABLET, FILM COATED ORAL 2 TIMES DAILY
Qty: 30 TABLET | Refills: 0 | Status: SHIPPED | OUTPATIENT
Start: 2024-06-27

## 2024-06-27 RX ORDER — POTASSIUM CHLORIDE 20 MEQ/1
40 TABLET, EXTENDED RELEASE ORAL ONCE
Status: COMPLETED | OUTPATIENT
Start: 2024-06-27 | End: 2024-06-27

## 2024-06-27 RX ORDER — OMEPRAZOLE 20 MG/1
20 CAPSULE, DELAYED RELEASE ORAL DAILY
Qty: 14 CAPSULE | Refills: 0 | Status: SHIPPED | OUTPATIENT
Start: 2024-06-27 | End: 2024-07-11

## 2024-06-27 RX ORDER — ASPIRIN 81 MG/1
4 TABLET, CHEWABLE ORAL ONCE
Status: COMPLETED | OUTPATIENT
Start: 2024-06-27 | End: 2024-06-27

## 2024-06-27 RX ADMIN — DROPERIDOL 0.62 MG: 2.5 INJECTION, SOLUTION INTRAMUSCULAR; INTRAVENOUS at 19:29

## 2024-06-27 RX ADMIN — POTASSIUM CHLORIDE 40 MEQ: 1500 TABLET, EXTENDED RELEASE ORAL at 20:51

## 2024-06-27 RX ADMIN — NITROGLYCERIN 0.4 MG: 0.4 TABLET SUBLINGUAL at 19:28

## 2024-06-27 RX ADMIN — ALUMINUM HYDROXIDE, MAGNESIUM HYDROXIDE, DIMETHICONE 30 ML: 200; 200; 20 LIQUID ORAL at 19:32

## 2024-06-27 RX ADMIN — FENTANYL CITRATE 50 MCG: 50 INJECTION INTRAMUSCULAR; INTRAVENOUS at 19:29

## 2024-06-27 NOTE — UTILIZATION REVIEW
NOTIFICATION OF ADMISSION DISCHARGE   This is a Notification of Discharge from [facility]. Please be advised that this patient has been discharge from our facility. Below you will find the admission and discharge date and time including the patient’s disposition.   UTILIZATION REVIEW CONTACT:  Bayron Candelaria  Utilization   Network Utilization Review Department  Phone: [phone] x carefully listen to the prompts. All voicemails are confidential.  Email: [email or web address]     ADMISSION INFORMATION  PRESENTATION DATE: 6/18/2024  5:49 PM  OBERVATION ADMISSION DATE:   INPATIENT ADMISSION DATE: 6/20/24 12:54 PM   DISCHARGE DATE: 6/26/2024  1:40 PM   DISPOSITION:Home/Self Care    Network Utilization Review Department  ATTENTION: Please call with any questions or concerns to [phone] and carefully listen to the prompts so that you are directed to the right person. All voicemails are confidential.   For Discharge needs, contact Care Management DC Support Team at [phone] opt. 2  Send all requests for admission clinical reviews, approved or denied determinations and any other requests to dedicated fax number below belonging to the campus where the patient is receiving treatment. List of dedicated fax numbers for the Facilities:  FACILITY NAME UR FAX NUMBER   ADMISSION DENIALS (Administrative/Medical Necessity) [phone]   DISCHARGE SUPPORT TEAM ([facility]) [phone]   PARENT CHILD HEALTH (Maternity/NICU/Pediatrics) [phone]   [facility] [phone]   [facility] [phone]   [facility] [phone]   [facility] [phone]   [facility] [phone]   [facility] [phone]   [facility] [phone]   [facility] [phone]   [facility]  [facility] [phone]   [facility] [phone]   [facility] [phone]   [facility] [phone]

## 2024-06-27 NOTE — ED ATTENDING ATTESTATION
6/27/2024  I, Cristina Anders MD, saw and evaluated the patient. I have discussed the patient with the resident/non-physician practitioner and agree with the resident's/non-physician practitioner's findings, Plan of Care, and MDM as documented in the resident's/non-physician practitioner's note, except where noted. All available labs and Radiology studies were reviewed.  I was present for key portions of any procedure(s) performed by the resident/non-physician practitioner and I was immediately available to provide assistance.       At this point I agree with the current assessment done in the Emergency Department.  I have conducted an independent evaluation of this patient a history and physical is as follows:    ED Course         Critical Care Time  Procedures    58 yo male discharged from hospital yesterday, c/o chest pain started today, feels like previous cardiac pain. No sob, nausea, no vomiting.   Pt with hx of afltter chf, pancreatitis, copd.  Vss, afebrile, lungs cta, rrr, abdomen soft nontender. No pedal edema.  Cardiac workup, nitro, pain meds, delta trop.

## 2024-06-28 ENCOUNTER — TELEPHONE (OUTPATIENT)
Dept: CARDIOLOGY CLINIC | Facility: CLINIC | Age: 59
End: 2024-06-28

## 2024-06-29 PROBLEM — E66.01 MORBID OBESITY (HCC): Status: RESOLVED | Noted: 2024-06-25 | Resolved: 2024-06-29

## 2024-06-29 PROBLEM — K85.10 ACUTE GALLSTONE PANCREATITIS: Status: RESOLVED | Noted: 2024-02-05 | Resolved: 2024-06-29

## 2024-06-29 NOTE — ED PROVIDER NOTES
Final Diagnoses:     1. Chest pain, unspecified    2. Chest pain        ED Course as of 06/28/24 2251   Thu Jun 27, 2024 2007 Potassium(!): 3.1   2132 hs TnI 0hr: 23   2132 hs TnI 2hr: 23     Nursing Triage:     Chief Complaint   Patient presents with    Chest Pain     Per pt, chest pain started an hour ago. Reports mid chest pain that radiates to the R side and up the jaw. Pt received 324 mg ASA and 1 nitro from EMS. Pt recently admitted and cardioverted for a-flutter.      HPI:   This is a 59 y.o. male presenting for evaluation of chest pain.   Relevant past medical history asthma, A-fib, CHF, CAD, hyperlipidemia, hypertension, hypokalemia,.  Patient presenting for chest pain.  States that he was discharged yesterday from the hospital after being managed for COPD exacerbation and chest pain he states that currently he is having similar chest pain to prior.  He says that he was just sitting in his chair when it started.  He says that he feels unwell from it.  He denies any other symptoms besides the cardiac.  He denies any headache, dizziness, shortness of breath, N/V/D, abdominal pain.  States he did not take anything for the pain came in because he is worried given his history of cardiac problems.  ASSESSMENT + PLAN:   Given reproducibility of pain and that he states it feels like nerve pain will do ACS workup.  Will also give nitro as well as medications for gastritis.  Will also do a delta Trope given the proximity in time to when he first arrived.  Patient feeling better at this time after medications.  Will discharge with Prilosec, nitro, Maalox and given instructions to follow-up outpatient.  Return if he has any worsening symptoms.    Physical:   Physical Exam  Vitals and nursing note reviewed.   Constitutional:       Appearance: Normal appearance.   HENT:      Head: Normocephalic and atraumatic.      Nose: Nose normal.      Mouth/Throat:      Mouth: Mucous membranes are moist.      Pharynx: No  oropharyngeal exudate or posterior oropharyngeal erythema.   Eyes:      Extraocular Movements: Extraocular movements intact.      Conjunctiva/sclera: Conjunctivae normal.      Pupils: Pupils are equal, round, and reactive to light.   Cardiovascular:      Rate and Rhythm: Normal rate and regular rhythm.      Pulses: Normal pulses.           Radial pulses are 2+ on the right side and 2+ on the left side.        Posterior tibial pulses are 2+ on the right side and 2+ on the left side.      Heart sounds: Normal heart sounds. No murmur heard.  Pulmonary:      Effort: Pulmonary effort is normal. No tachypnea or respiratory distress.      Breath sounds: Normal breath sounds. No stridor.   Abdominal:      General: Abdomen is flat. There is no distension.      Palpations: Abdomen is soft.      Tenderness: There is no abdominal tenderness.   Musculoskeletal:         General: Normal range of motion.      Cervical back: Normal range of motion.      Right lower leg: Edema present.      Left lower leg: Edema present.   Lymphadenopathy:      Cervical: No cervical adenopathy.   Skin:     General: Skin is warm and dry.      Capillary Refill: Capillary refill takes less than 2 seconds.   Neurological:      General: No focal deficit present.      Mental Status: He is alert and oriented to person, place, and time.      Cranial Nerves: No cranial nerve deficit.      Sensory: No sensory deficit.   Psychiatric:         Mood and Affect: Mood normal.         Behavior: Behavior normal.       ED Triage Vitals [06/27/24 1850]   Temperature Pulse Respirations Blood Pressure SpO2   (!) 97.2 °F (36.2 °C) 79 20 118/69 97 %      Temp Source Heart Rate Source Patient Position - Orthostatic VS BP Location FiO2 (%)   Oral Monitor Lying Right arm --      Pain Score       --         Vitals:    06/27/24 1850 06/27/24 2000 06/27/24 2100   BP: 118/69 119/66 124/59   TempSrc: Oral     Pulse: 79 75 88   Resp: 20 15 16   Patient Position - Orthostatic VS:  Lying Lying Lying   Temp: (!) 97.2 °F (36.2 °C)       Lab Results   Component Value Date    POCGLU 304 (H) 06/26/2024    POCGLU 156 (H) 06/26/2024    POCGLU 175 (H) 06/25/2024       - There are no obvious limitations to social determinants of care.   - Nursing note reviewed.   - Vitals reviewed.   - Orders placed by myself.    - Previous chart was reviewed  - No language barrier.   - History obtained from patient.    - There are no limitations to the history obtained:     Past Medical:    has a past medical history of Acute gastritis without hemorrhage, Asthma, Atrial fibrillation (HCC), Bilateral leg edema (02/19/2020), CHF (congestive heart failure) (Formerly [facility]), Coronary artery disease, Daytime sleepiness (07/17/2019), Diabetes mellitus (HCC), Dyspnea on exertion (10/11/2021), Edema of both feet (01/23/2020), Gastric bypass status for obesity, Hyperlipidemia, Hypertension, Hypokalemia (11/14/2021), Impaired fasting glucose, Knee sprain, bilateral (06/14/2018), Leg cramps (06/16/2022), Obesity, and Viral gastroenteritis.    Past Surgical:    has a past surgical history that includes Gastric bypass (2004); Hernia repair; Carpal tunnel release; Tonsillectomy; Cardiac catheterization (N/A, 3/9/2022); Cardiac catheterization (N/A, 9/6/2022); Cardiac catheterization (N/A, 9/6/2022); Cardiac catheterization (N/A, 10/11/2023); IR biopsy bone marrow (12/14/2023); CHOLECYSTECTOMY LAPAROSCOPIC (N/A, 2/7/2024); LAPAROSCOPIC TRANSGASTRIC ACCESS FOR ERCP (N/A, 2/7/2024); and LAPAROSCOPIC TRANSGASTRIC ACCESS FOR ERCP (N/A, 2/7/2024).    Social:     Social History     Substance and Sexual Activity   Alcohol Use Yes    Alcohol/week: 2.0 standard drinks of alcohol    Types: 2 Shots of liquor per week    Comment: social     Social History     Tobacco Use   Smoking Status Former    Types: Pipe, Cigars    Start date: 2016    Quit date: 1/1/2021    Years since quitting: 3.4    Passive exposure: Never   Smokeless Tobacco Never   Tobacco  Comments    cigar once a day     Social History     Substance and Sexual Activity   Drug Use No       Code Status: Prior  Advance Directive and Living Will:      Power of :    POLST:    Medications   aspirin chewable tablet 324 mg (0 mg Does not apply Given to EMS 6/27/24 1941)   nitroglycerin (NITROSTAT) SL tablet 0.4 mg (0.4 mg Sublingual Given 6/27/24 1928)   fentaNYL injection 50 mcg (50 mcg Intravenous Given 6/27/24 1929)   aluminum-magnesium hydroxide-simethicone (MAALOX) oral suspension 30 mL (30 mL Oral Given 6/27/24 1932)   droperidol (INAPSINE) injection 0.625 mg (0.625 mg Intravenous Given 6/27/24 1929)   potassium chloride (Klor-Con M20) CR tablet 40 mEq (40 mEq Oral Given 6/27/24 2051)     XR chest 1 view portable   ED Interpretation   No acute cardiopulmonary disease      Final Result      No acute cardiopulmonary disease.            Workstation performed: [id number]           Orders Placed This Encounter   Procedures    XR chest 1 view portable    CBC and differential    Comprehensive metabolic panel    HS Troponin 0hr (reflex protocol)    HS Troponin I 2hr    ECG 12 lead    ECG 12 lead    ECG 12 lead     Labs Reviewed   CBC AND DIFFERENTIAL - Abnormal       Result Value Ref Range Status    WBC 8.64  4.31 - 10.16 Thousand/uL Final    RBC 3.56 (*) 3.88 - 5.62 Million/uL Final    Hemoglobin 8.2 (*) 12.0 - 17.0 g/dL Final    Hematocrit 28.0 (*) 36.5 - 49.3 % Final    MCV 79 (*) 82 - 98 fL Final    MCH 23.0 (*) 26.8 - 34.3 pg Final    MCHC 29.3 (*) 31.4 - 37.4 g/dL Final    RDW 18.9 (*) 11.6 - 15.1 % Final    MPV 10.1  8.9 - 12.7 fL Final    Platelets 299  149 - 390 Thousands/uL Final    nRBC 0  /100 WBCs Final    Segmented % 70  43 - 75 % Final    Immature Grans % 2  0 - 2 % Final    Lymphocytes % 14  14 - 44 % Final    Monocytes % 8  4 - 12 % Final    Eosinophils Relative 5  0 - 6 % Final    Basophils Relative 1  0 - 1 % Final    Absolute Neutrophils 6.06  1.85 - 7.62 Thousands/µL Final     Absolute Immature Grans 0.17  0.00 - 0.20 Thousand/uL Final    Absolute Lymphocytes 1.22  0.60 - 4.47 Thousands/µL Final    Absolute Monocytes 0.68  0.17 - 1.22 Thousand/µL Final    Eosinophils Absolute 0.46  0.00 - 0.61 Thousand/µL Final    Basophils Absolute 0.05  0.00 - 0.10 Thousands/µL Final   COMPREHENSIVE METABOLIC PANEL - Abnormal    Sodium 137  135 - 147 mmol/L Final    Potassium 3.1 (*) 3.5 - 5.3 mmol/L Final    Chloride 102  96 - 108 mmol/L Final    CO2 21  21 - 32 mmol/L Final    ANION GAP 14 (*) 4 - 13 mmol/L Final    BUN 18  5 - 25 mg/dL Final    Creatinine 1.28  0.60 - 1.30 mg/dL Final    Comment: Standardized to IDMS reference method    Glucose 206 (*) 65 - 140 mg/dL Final    Comment: If the patient is fasting, the ADA then defines impaired fasting glucose as > 100 mg/dL and diabetes as > or equal to 123 mg/dL.    Calcium 7.5 (*) 8.4 - 10.2 mg/dL Final    Corrected Calcium 8.0 (*) 8.3 - 10.1 mg/dL Final    AST 13  13 - 39 U/L Final    ALT 9  7 - 52 U/L Final    Comment: Specimen collection should occur prior to Sulfasalazine administration due to the potential for falsely depressed results.     Alkaline Phosphatase 87  34 - 104 U/L Final    Total Protein 6.1 (*) 6.4 - 8.4 g/dL Final    Albumin 3.4 (*) 3.5 - 5.0 g/dL Final    Total Bilirubin 0.31  0.20 - 1.00 mg/dL Final    Comment: Use of this assay is not recommended for patients undergoing treatment with eltrombopag due to the potential for falsely elevated results.  N-acetyl-p-benzoquinone imine (metabolite of Acetaminophen) will generate erroneously low results in samples for patients that have taken an overdose of Acetaminophen.    eGFR 61  ml/min/1.73sq m Final    Narrative:     National Kid[employer] guidelines for Chronic Kidney Disease (CKD):     Stage 1 with normal or high GFR (GFR > 90 mL/min/1.73 square meters)    Stage 2 Mild CKD (GFR = 60-89 mL/min/1.73 square meters)    Stage 3A Moderate CKD (GFR = 45-59 mL/min/1.73 square  "meters)    Stage 3B Moderate CKD (GFR = 30-44 mL/min/1.73 square meters)    Stage 4 Severe CKD (GFR = 15-29 mL/min/1.73 square meters)    Stage 5 End Stage CKD (GFR <15 mL/min/1.73 square meters)  Note: GFR calculation is accurate only with a steady state creatinine   HS TROPONIN I 0HR - Normal    hs TnI 0hr 23  \"Refer to ACS Flowchart\"- see link ng/L Final    Comment:                                              Initial (time 0) result  If >=50 ng/L, Myocardial injury suggested ;  Type of myocardial injury and treatment strategy  to be determined.  If 5-49 ng/L, a delta result at 2 hours and or 4 hours will be needed to further evaluate.  If <4 ng/L, and chest pain has been >3 hours since onset, patient may qualify for discharge based on the HEART score in the ED.  If <5 ng/L and <3hours since onset of chest pain, a delta result at 2 hours will be needed to further evaluate.    HS Troponin 99th Percentile URL of a Health Population=12 ng/L with a 95% Confidence Interval of 8-18 ng/L.    Second Troponin (time 2 hours)  If calculated delta >= 20 ng/L,  Myocardial injury suggested ; Type of myocardial injury and treatment strategy to be determined.  If 5-49 ng/L and the calculated delta is 5-19 ng/L, consult medical service for evaluation.  Continue evaluation for ischemia on ecg and other possible etiology and repeat hs troponin at 4 hours.  If delta is <5 ng/L at 2 hours, consider discharge based on risk stratification via the HEART score (if in ED), or ISATU risk score in IP/Observation.    HS Troponin 99th Percentile URL of a Health Population=12 ng/L with a 95% Confidence Interval of 8-18 ng/L.   HS TROPONIN I 2HR - Normal    hs TnI 2hr 23  \"Refer to ACS Flowchart\"- see link ng/L Final    Comment:                                              Initial (time 0) result  If >=50 ng/L, Myocardial injury suggested ;  Type of myocardial injury and treatment strategy  to be determined.  If 5-49 ng/L, a delta result at 2 " hours and or 4 hours will be needed to further evaluate.  If <4 ng/L, and chest pain has been >3 hours since onset, patient may qualify for discharge based on the HEART score in the ED.  If <5 ng/L and <3hours since onset of chest pain, a delta result at 2 hours will be needed to further evaluate.    HS Troponin 99th Percentile URL of a Health Population=12 ng/L with a 95% Confidence Interval of 8-18 ng/L.    Second Troponin (time 2 hours)  If calculated delta >= 20 ng/L,  Myocardial injury suggested ; Type of myocardial injury and treatment strategy to be determined.  If 5-49 ng/L and the calculated delta is 5-19 ng/L, consult medical service for evaluation.  Continue evaluation for ischemia on ecg and other possible etiology and repeat hs troponin at 4 hours.  If delta is <5 ng/L at 2 hours, consider discharge based on risk stratification via the HEART score (if in ED), or ISATU risk score in IP/Observation.    HS Troponin 99th Percentile URL of a Health Population=12 ng/L with a 95% Confidence Interval of 8-18 ng/L.    Delta 2hr hsTnI 0  <20 ng/L Final   LIGHT BLUE TOP       Time reflects when diagnosis was documented in both MDM as applicable and the Disposition within this note       Time User Action Codes Description Comment    6/27/2024  9:42 PM Earl Sarkar [R07.9] Chest pain, unspecified     6/27/2024  9:44 PM Earl Sarkar [R07.9] Chest pain           ED Disposition       ED Disposition   Discharge    Condition   Stable    Date/Time   u Jun 27, 2024  9:42 PM    Comment   Mesfin Cano discharge to home/self care.                   Follow-up Information    None       Discharge Medication List as of 6/27/2024  9:45 PM        START taking these medications    Details   aluminum-magnesium hydroxide 200-200 MG/5ML suspension Take 5 mL by mouth every 6 (six) hours as needed for heartburn, Starting Thu 6/27/2024, Normal      famotidine (PEPCID) 20 mg tablet Take 1 tablet (20 mg total) by mouth 2 (two)  times a day, Starting Thu 6/27/2024, Normal      nitroglycerin (NITROSTAT) 0.4 mg SL tablet Place 1 tablet (0.4 mg total) under the tongue every 5 (five) minutes as needed for chest pain for up to 50 doses, Starting Thu 6/27/2024, Normal      omeprazole (PriLOSEC) 20 mg delayed release capsule Take 1 capsule (20 mg total) by mouth daily for 14 days, Starting Thu 6/27/2024, Until Thu 7/11/2024, Normal           CONTINUE these medications which have NOT CHANGED    Details   Accu-Chek FastClix Lancets MISC Test blood sugar twice daily, Normal      acetaminophen (TYLENOL) 325 mg tablet Take 2 tablets (650 mg total) by mouth every 6 (six) hours as needed for mild pain, headaches or fever, Starting Mon 2/12/2024, OTC      albuterol (PROVENTIL HFA,VENTOLIN HFA) 90 mcg/act inhaler Inhale 2 puffs every 4 (four) hours as needed for wheezing or shortness of breath, Starting Mon 1/29/2024, Normal      apixaban (Eliquis) 5 mg Take 1 tablet (5 mg total) by mouth 2 (two) times a day, Starting Tue 10/24/2023, Normal      atorvastatin (LIPITOR) 10 mg tablet TAKE 1 TABLET BY MOUTH EVERY DAY, Normal      Blood Glucose Monitoring Suppl (Accu-Chek Guide) w/Device KIT Use 2 (two) times a day Test blood sugar twice daily, Starting Thu 8/5/2021, Normal      budesonide-formoterol (Symbicort) 160-4.5 mcg/act inhaler Inhale 2 puffs 2 (two) times a day Rinse mouth after use., Starting Thu 1/11/2024, Normal      bumetanide (BUMEX) 2 mg tablet Take 3 tablets (6 mg total) by mouth 2 (two) times a day, Starting Wed 11/22/2023, Until Fri 5/24/2024, Normal      Empagliflozin (JARDIANCE) 10 MG TABS tablet Take 1 tablet (10 mg total) by mouth daily, Starting Fri 5/26/2023, Normal      EPINEPHrine (EPIPEN) 0.3 mg/0.3 mL SOAJ Inject 0.3 mL (0.3 mg total) into a muscle once for 1 dose, Starting Tue 2/21/2023, Normal      fluticasone (FLONASE) 50 mcg/act nasal spray 1 spray into each nostril 2 (two) times a day, Starting Thu 10/12/2023, OT      Klor-Con  M20 20 MEQ tablet TAKE 2 TABLETS BY MOUTH 2 TIMES A DAY., Starting Mon 3/18/2024, Normal      loratadine (CLARITIN) 10 mg tablet Take 1 tablet (10 mg total) by mouth daily Do not start before October 13, 2023., Starting Fri 10/13/2023, OTC      metolazone (ZAROXOLYN) 2.5 mg tablet Take 2 tablets (5 mg total) by mouth if needed (weight gain of 3+ lbs in 24 hours, 5+ lbs in one week or signs of worsening fluid retention) Take 20-30 minutes before Bumex dose and with additional potassium, Starting Thu 1/11/2024, No Print      metoprolol succinate (TOPROL-XL) 50 mg 24 hr tablet Take 1 tablet (50 mg total) by mouth daily, Starting Sun 6/9/2024, Normal      montelukast (SINGULAIR) 10 mg tablet Take 1 tablet (10 mg total) by mouth daily at bedtime, Starting Thu 2/3/2022, Until Fri 5/24/2024, Normal      pregabalin (LYRICA) 50 mg capsule Take 1 caps. at bedtime, Normal      sitaGLIPtin (Januvia) 100 mg tablet TAKE 1/2 TABLET BY MOUTH EVERY DAY, Normal      spironolactone (ALDACTONE) 25 mg tablet TAKE 1 TABLET (25 MG TOTAL) BY MOUTH DAILY., Starting Fri 5/10/2024, Normal      tiotropium (Spiriva Respimat) 1.25 MCG/ACT AERS inhaler Inhale 2 puffs daily, Starting Wed 4/3/2024, Until Fri 5/24/2024, Normal           No discharge procedures on file.  Prior to Admission Medications   Prescriptions Last Dose Informant Patient Reported? Taking?   Accu-Chek FastClix Lancets MISC  Self No No   Sig: Test blood sugar twice daily   Blood Glucose Monitoring Suppl (Accu-Chek Guide) w/Device KIT  Self No No   Sig: Use 2 (two) times a day Test blood sugar twice daily   EPINEPHrine (EPIPEN) 0.3 mg/0.3 mL SOAJ  Self No No   Sig: Inject 0.3 mL (0.3 mg total) into a muscle once for 1 dose   Empagliflozin (JARDIANCE) 10 MG TABS tablet  Self No No   Sig: Take 1 tablet (10 mg total) by mouth daily   Klor-Con M20 20 MEQ tablet   No No   Sig: TAKE 2 TABLETS BY MOUTH 2 TIMES A DAY.   acetaminophen (TYLENOL) 325 mg tablet   No No   Sig: Take 2 tablets  "(650 mg total) by mouth every 6 (six) hours as needed for mild pain, headaches or fever   albuterol (PROVENTIL HFA,VENTOLIN HFA) 90 mcg/act inhaler   No No   Sig: Inhale 2 puffs every 4 (four) hours as needed for wheezing or shortness of breath   apixaban (Eliquis) 5 mg  Self No No   Sig: Take 1 tablet (5 mg total) by mouth 2 (two) times a day   atorvastatin (LIPITOR) 10 mg tablet   No No   Sig: TAKE 1 TABLET BY MOUTH EVERY DAY   budesonide-formoterol (Symbicort) 160-4.5 mcg/act inhaler   No No   Sig: Inhale 2 puffs 2 (two) times a day Rinse mouth after use.   bumetanide (BUMEX) 2 mg tablet   No No   Sig: Take 3 tablets (6 mg total) by mouth 2 (two) times a day   fluticasone (FLONASE) 50 mcg/act nasal spray  Self No No   Si spray into each nostril 2 (two) times a day   loratadine (CLARITIN) 10 mg tablet  Self No No   Sig: Take 1 tablet (10 mg total) by mouth daily Do not start before 2023.   metolazone (ZAROXOLYN) 2.5 mg tablet   No No   Sig: Take 2 tablets (5 mg total) by mouth if needed (weight gain of 3+ lbs in 24 hours, 5+ lbs in one week or signs of worsening fluid retention) Take 20-30 minutes before Bumex dose and with additional potassium   metoprolol succinate (TOPROL-XL) 50 mg 24 hr tablet   No No   Sig: Take 1 tablet (50 mg total) by mouth daily   montelukast (SINGULAIR) 10 mg tablet  Self No No   Sig: Take 1 tablet (10 mg total) by mouth daily at bedtime   pregabalin (LYRICA) 50 mg capsule   No No   Sig: Take 1 caps. at bedtime   sitaGLIPtin (Januvia) 100 mg tablet   No No   Sig: TAKE 1/2 TABLET BY MOUTH EVERY DAY   spironolactone (ALDACTONE) 25 mg tablet   No No   Sig: TAKE 1 TABLET (25 MG TOTAL) BY MOUTH DAILY.   tiotropium (Spiriva Respimat) 1.25 MCG/ACT AERS inhaler   No No   Sig: Inhale 2 puffs daily      Facility-Administered Medications: None                        Portions of the record may have been created with voice recognition software. Occasional wrong word or \"sound a like\" " substitutions may have occurred due to the inherent limitations of voice recognition software. Read the chart carefully and recognize, using context, where substitutions have occurred.     Earl Sarkar MD  06/28/24 0129

## 2024-07-01 ENCOUNTER — CLINICAL SUPPORT (OUTPATIENT)
Dept: CARDIOLOGY CLINIC | Facility: CLINIC | Age: 59
End: 2024-07-01

## 2024-07-01 DIAGNOSIS — I50.32 CHRONIC HEART FAILURE WITH PRESERVED EJECTION FRACTION (HCC): Primary | ICD-10-CM

## 2024-07-02 ENCOUNTER — TELEMEDICINE (OUTPATIENT)
Dept: FAMILY MEDICINE CLINIC | Facility: CLINIC | Age: 59
End: 2024-07-02
Payer: COMMERCIAL

## 2024-07-02 ENCOUNTER — TELEPHONE (OUTPATIENT)
Dept: LAB | Facility: HOSPITAL | Age: 59
End: 2024-07-02

## 2024-07-02 DIAGNOSIS — R60.0 BILATERAL LEG EDEMA: ICD-10-CM

## 2024-07-02 DIAGNOSIS — I10 PRIMARY HYPERTENSION: ICD-10-CM

## 2024-07-02 DIAGNOSIS — J45.40 MODERATE PERSISTENT ASTHMA WITHOUT COMPLICATION: ICD-10-CM

## 2024-07-02 DIAGNOSIS — G47.33 OSA (OBSTRUCTIVE SLEEP APNEA): Chronic | ICD-10-CM

## 2024-07-02 DIAGNOSIS — N18.32 STAGE 3B CHRONIC KIDNEY DISEASE (HCC): ICD-10-CM

## 2024-07-02 DIAGNOSIS — I48.92 UNCONTROLLED ATRIAL FLUTTER (HCC): Primary | ICD-10-CM

## 2024-07-02 DIAGNOSIS — E87.6 HYPOKALEMIA: ICD-10-CM

## 2024-07-02 DIAGNOSIS — E66.01 MORBID OBESITY DUE TO EXCESS CALORIES (HCC): Chronic | ICD-10-CM

## 2024-07-02 DIAGNOSIS — D63.1 ANEMIA DUE TO STAGE 3 CHRONIC KIDNEY DISEASE, UNSPECIFIED WHETHER STAGE 3A OR 3B CKD  (HCC): ICD-10-CM

## 2024-07-02 DIAGNOSIS — E11.42 DIABETIC POLYNEUROPATHY ASSOCIATED WITH TYPE 2 DIABETES MELLITUS (HCC): ICD-10-CM

## 2024-07-02 DIAGNOSIS — I50.32 CHRONIC DIASTOLIC CONGESTIVE HEART FAILURE (HCC): ICD-10-CM

## 2024-07-02 DIAGNOSIS — N18.30 ANEMIA DUE TO STAGE 3 CHRONIC KIDNEY DISEASE, UNSPECIFIED WHETHER STAGE 3A OR 3B CKD  (HCC): ICD-10-CM

## 2024-07-02 PROCEDURE — 99496 TRANSJ CARE MGMT HIGH F2F 7D: CPT | Performed by: FAMILY MEDICINE

## 2024-07-02 NOTE — PROGRESS NOTES
Virtual TCM Visit:    Verification of patient location:    Patient is located at Home in the following state in which I hold an active license [place]    Assessment & Plan   1. Uncontrolled atrial flutter (HCC)  Assessment & Plan:  Patient underwent cardioversion in ED with return to normal sinus rhythm.  Thyroid function test was normal.    Echocardiogram showed EF 55%.  Patient reports no heart palpitations.    Continue beta-blocker, anticoagulation with Eliquis 5 mg twice daily.    Follow-up with [facility] cardiology on July 11, 2024.  Orders:  -     Basic metabolic panel; Future; Expected date: 07/06/2024  2. Chronic diastolic CHF (HCC)  Assessment & Plan:  Wt Readings from Last 3 Encounters:   06/26/24 (!) 149 kg (328 lb 3.2 oz)   05/24/24 (!) 150 kg (331 lb 9.6 oz)   02/20/24 (!) 151 kg (332 lb 12.8 oz)     Patient appears euvolemic on exam.    Continue Bumex 5 mg twice daily, Metolazone PRN.  Check daily weights.  Fllow a low-sodium diet.    Follow-up with cardiology next week.      Orders:  -     Basic metabolic panel; Future; Expected date: 07/06/2024  3. Primary hypertension  Assessment & Plan:  BP today 128/70.    Continue current medical therapy.   Patient c/o feeling lightheaded with standing up and changing position.    Rule out orthostatic hypotension.  R  Recommended patient to check blood pressure sitting and standing.  Avoid rapid change in position.  Start wearing compression stockings.  Follow-up with cardiology.  4. Stage 3b chronic kidney disease (HCC)  Assessment & Plan:  Lab Results   Component Value Date    EGFR 61 06/27/2024    EGFR 51 06/26/2024    EGFR 41 06/24/2024    CREATININE 1.28 06/27/2024    CREATININE 1.48 (H) 06/26/2024    CREATININE 1.76 (H) 06/24/2024     Recommended to avoid NSAIDs, nephrotoxins.    Check BMP.  Schedule follow-up visit with nephrology Dr. Vyas.  Orders:  -     Basic metabolic panel; Future; Expected date: 07/06/2024  5. Anemia due to stage 3 chronic kidney  disease, unspecified whether stage 3a or 3b CKD  (HCC)  Assessment & Plan:  Patient received iron infusions during hospitalization.    Blood test done on June 27, 2024 showed Hb of 8.2.    Recommended to follow-up with hematology, nephrology.  6. Morbid obesity (HCC)  Assessment & Plan:  Recommended to work on dietary and lifestyle modifications, lose weight.  Referred to sleep medicine to discuss scheduling sleep study.    Orders:  -     Ambulatory Referral to Sleep Medicine; Future  7. Diabetic polyneuropathy associated with type 2 diabetes mellitus (HCC)  Assessment & Plan:    Lab Results   Component Value Date    HGBA1C 8.3 (H) 06/19/2024     Hb A1C goal < 7.0.    Recommended to follow a low carb diet, work on weight reduction.  Continue Januvia 100 mg -1/2 tab daily, Jardiance 10 mg daily.    Check blood sugar at home daily and send in blood sugar log in 2 weeks.    Consider to increase dose of Jardiance to 25 mg daily.    Continue Lyrica 50 mg daily.  Follow-up with podiatry for routine foot care.  Check eye exam by ophthalmology annually.  8. VICKY (obstructive sleep apnea)  Assessment & Plan:  Recommended to schedule appointment with sleep medicine, discuss scheduling sleep study.      Orders:  -     Ambulatory Referral to Sleep Medicine; Future  9. Hypokalemia  Assessment & Plan:  Continue Potassium Chloride 40 mEq twice daily.    Check BMP.   Orders:  -     Basic metabolic panel; Future; Expected date: 07/06/2024  10. Moderate persistent asthma without complication  Assessment & Plan:  Patient reports no chest tightness, no wheezing.    Continue to use Symbicort 160/4.5 mcg 2 puffs twice daily, Spiriva Respimat daily.  Use Albuterol via nebulizer PRN.    11. Bilateral leg edema  Assessment & Plan:  Recommended to wear compression stockings.    Will mail Rx for TARA stockings to patient.    Orders:  -     TEDS Stockings           Schedule follow-up visit, physical exam in 2 months.    Encounter provider  Soo Chavez MD     Provider located at [facility]  [address] [id number]    Recent Visits  No visits were found meeting these conditions.  Showing recent visits within past 7 days and meeting all other requirements  Today's Visits  Date Type Provider Dept   07/02/24 Telemedicine Soo Chavez MD [facility]   Showing today's visits and meeting all other requirements  Future Appointments  No visits were found meeting these conditions.  Showing future appointments within next 150 days and meeting all other requirements       After connecting through [employer], the patient was identified by name and date of birth. Mesfin Cano was informed that this is a telemedicine visit and that the visit is being conducted through the Epic Embedded platform. He agrees to proceed..  My office door was closed. No one else was in the room.  He acknowledged consent and understanding of privacy and security of the video platform. The patient has agreed to participate and understands they can discontinue the visit at any time.    Patient is aware this is a billable service.    History of Present Illness     Patient presents for virtual TCM visit.      He was admitted to [facility] 6/18/24 -6/26/24 for uncontrolled A.Flutter. He underwent cardioversion in ED with return to normal sinus rhythm.     Patient had abnormal stress test and was recommended outpatient coronary CTA.    No need for cardiac cath.  He was recommended sleep study as an outpatient.    He was seen in [facility] ER on June 27, 2024 for chest pain.  Troponin was negative.  He was prescribed NTG 0.4 mg sublingual for PRN use, Famotidine 20 mg 1 tablet twice daily, Omeprazole 20 mg daily for 2 weeks, Maalox.     Reviewed hospital records, test results, discharge medications with patient.      He reports improvement in shortness of breath.  Denies chest pain, heart palpitations.     C/o feeling lightheaded with standing up and change in position.    Blood pressure today 128/70, HR 60.      Follow-up visit scheduled with cardiology KARINE on July 11, 2024.    He will also follow-up with cardiology Dr. Wilcox in August.      Asthma - symptoms are stable.      Type 2 DM - fasting blood sugar ranges 128 - 138.  Denies hypoglycemic episodes.    Currently taking Jardiance 10 mg daily, Januvia 100 mg -1/2 tablet daily.    Anemia - patient received IV iron infusions during hospitalization.    Blood test done on June 27, 2024 showed creatinine of 1.28, GFR 61, glucose 206, calcium 7.5, potassium 3.1. Hb 8.2.    Transitional Care Management Review:   Mesfin Cano is a 59 y.o. male here for TCM follow up.    During the TCM phone call patient stated:  TCM Call       Date and time call was made  6/27/2024 10:18 AM    Hospital care reviewed  Records reviewed    Patient was hospitialized at  [facility]    Date of Admission  06/18/24    Date of discharge  06/26/24    Diagnosis  Uncontrolled atrial flutter (HCC)    Disposition  Home    Were the patients medications reviewed and updated  No    Current Symptoms  None    Shortness of breath severity  Moderate          TCM Call       Post hospital issues  None    Should patient be enrolled in anticoag monitoring?  No    Scheduled for follow up?  Yes    Patients specialists  Cardiologist    Cardiologist name  Bobby Betancur MD    Cardiologist contact #  [phone]    Did you obtain your prescribed medications  Yes    Do you need help managing your prescriptions or medications  No    Is transportation to your appointment needed  No    I have advised the patient to call PCP with any new or worsening symptoms  Annmarie Samuel     Living Arrangements  Family members    Support System  Family    The type of support provided  None    Do you have social support  Yes, some    Are you recieving any outpatient services  No    Are you recieving home  care services  No    Types of home care services  Nurse visit; Home PT; Home health aid    Are you using any community resources  No    Current waiver services  No    Have you fallen in the last 12 months  No    Interperter language line needed  No    Counseling  Patient            Review of Systems   Constitutional:  Positive for fatigue. Negative for activity change, appetite change, chills and fever.   HENT:  Negative for congestion, ear pain, sore throat and trouble swallowing.    Eyes:  Negative for pain, discharge, redness, itching and visual disturbance.   Respiratory:  Positive for shortness of breath (improved). Negative for cough, chest tightness and wheezing.    Cardiovascular:  Positive for leg swelling. Negative for chest pain (improved) and palpitations.   Gastrointestinal:  Negative for abdominal pain, blood in stool, constipation, diarrhea, nausea and vomiting.   Genitourinary:  Negative for difficulty urinating, dysuria and hematuria.   Musculoskeletal:  Positive for arthralgias and gait problem (walks with a cane). Negative for back pain.   Skin:  Negative for rash.   Neurological:  Positive for light-headedness and numbness. Negative for syncope and headaches (in feet).   Hematological: Negative.    Psychiatric/Behavioral:  Negative for dysphoric mood and sleep disturbance. The patient is not nervous/anxious.      Objective     There were no vitals taken for this visit.    Physical Exam  Vitals and nursing note reviewed.   Constitutional:       Appearance: Normal appearance.      Comments: Morbidly obese   HENT:      Head: Normocephalic and atraumatic.   Eyes:      Conjunctiva/sclera: Conjunctivae normal.      Pupils: Pupils are equal, round, and reactive to light.   Cardiovascular:      Comments: Trace BL LE edema    Pulmonary:      Effort: Pulmonary effort is normal.      Breath sounds: No wheezing.   Abdominal:      General: There is no distension.      Palpations: Abdomen is soft.    Musculoskeletal:         General: No swelling.      Cervical back: Normal range of motion and neck supple.   Skin:     Findings: No rash.   Neurological:      Mental Status: He is alert.   Psychiatric:         Mood and Affect: Mood normal.       Medications have been reviewed by provider in current encounter    Administrative Statements           Soo Chavez MD      VIRTUAL VISIT DISCLAIMER    Mesfin SHORE Rachael verbally agrees to participate in Virtual Care Services. Pt is aware that Virtual Care Services could be limited without vital signs or the ability to perform a full hands-on physical exam. Mesfin Cano understands he or the provider may request at any time to terminate the video visit and request the patient to seek care or treatment in person.

## 2024-07-03 NOTE — ASSESSMENT & PLAN NOTE
Lab Results   Component Value Date    HGBA1C 8.3 (H) 06/19/2024     Hb A1C goal < 7.0.    Recommended to follow a low carb diet, work on weight reduction.  Continue Januvia 100 mg -1/2 tab daily, Jardiance 10 mg daily.    Check blood sugar at home daily and send in blood sugar log in 2 weeks.    Consider to increase dose of Jardiance to 25 mg daily.    Continue Lyrica 50 mg daily.  Follow-up with podiatry for routine foot care.  Check eye exam by ophthalmology annually.

## 2024-07-03 NOTE — ASSESSMENT & PLAN NOTE
Lab Results   Component Value Date    EGFR 61 06/27/2024    EGFR 51 06/26/2024    EGFR 41 06/24/2024    CREATININE 1.28 06/27/2024    CREATININE 1.48 (H) 06/26/2024    CREATININE 1.76 (H) 06/24/2024     Recommended to avoid NSAIDs, nephrotoxins.    Check BMP.  Schedule follow-up visit with nephrology Dr. Vyas.

## 2024-07-03 NOTE — ASSESSMENT & PLAN NOTE
Wt Readings from Last 3 Encounters:   06/26/24 (!) 149 kg (328 lb 3.2 oz)   05/24/24 (!) 150 kg (331 lb 9.6 oz)   02/20/24 (!) 151 kg (332 lb 12.8 oz)     Patient appears euvolemic on exam.    Continue Bumex 5 mg twice daily, Metolazone PRN.  Check daily weights.  Fllow a low-sodium diet.    Follow-up with cardiology next week.

## 2024-07-03 NOTE — ASSESSMENT & PLAN NOTE
BP today 128/70.    Continue current medical therapy.   Patient c/o feeling lightheaded with standing up and changing position.    Rule out orthostatic hypotension.  R  Recommended patient to check blood pressure sitting and standing.  Avoid rapid change in position.  Start wearing compression stockings.  Follow-up with cardiology.

## 2024-07-03 NOTE — ASSESSMENT & PLAN NOTE
Recommended to work on dietary and lifestyle modifications, lose weight.  Referred to sleep medicine to discuss scheduling sleep study.

## 2024-07-03 NOTE — ASSESSMENT & PLAN NOTE
Patient underwent cardioversion in ED with return to normal sinus rhythm.  Thyroid function test was normal.    Echocardiogram showed EF 55%.  Patient reports no heart palpitations.    Continue beta-blocker, anticoagulation with Eliquis 5 mg twice daily.    Follow-up with [facility] cardiology on July 11, 2024.

## 2024-07-03 NOTE — ASSESSMENT & PLAN NOTE
Patient reports no chest tightness, no wheezing.    Continue to use Symbicort 160/4.5 mcg 2 puffs twice daily, Spiriva Respimat daily.  Use Albuterol via nebulizer PRN.

## 2024-07-03 NOTE — ASSESSMENT & PLAN NOTE
Patient received iron infusions during hospitalization.    Blood test done on June 27, 2024 showed Hb of 8.2.    Recommended to follow-up with hematology, nephrology.

## 2024-07-06 DIAGNOSIS — I48.91 A-FIB (HCC): ICD-10-CM

## 2024-07-06 DIAGNOSIS — I48.91 ATRIAL FIBRILLATION, UNSPECIFIED TYPE (HCC): ICD-10-CM

## 2024-07-07 RX ORDER — APIXABAN 5 MG/1
5 TABLET, FILM COATED ORAL 2 TIMES DAILY
Qty: 60 TABLET | Refills: 5 | Status: SHIPPED | OUTPATIENT
Start: 2024-07-07

## 2024-07-09 ENCOUNTER — TELEPHONE (OUTPATIENT)
Dept: CARDIOLOGY CLINIC | Facility: CLINIC | Age: 59
End: 2024-07-09

## 2024-07-09 NOTE — TELEPHONE ENCOUNTER
Called patient to F/U after reviewing his Cariomemes. Patient has had intermittent usage & elevated readings currently.    Patient stated he has been dizzy, lightheaded, & felling like he is going to pass out. Today he had an episode of feeling really dizzy.     Stated on Saturday evening 7/6, he experienced symptoms & actually did fall in his home & passed out for 30 - 40 seconds.     Stated he has been experiencing dizziness & feeling lightheaded since about 1 week before his last hospitalization, around 6/20.     Denied chest pain or discomfort with syncopal episodes or SOB.  Denied any chest pain or discomfort in general. Stated experiencing exertional dyspnea.    Stated the last 3 - 4 nights, he has been awaken with a funny feeling in his chest, he can't explain. Not chest pain. Feels he has to take really deep breaths.    Stated BP has been steady between 124 - 127/ 68.  Today's Wt - 327 lb.    Denied KIERRA.  Talking all his cardiac medications as ordered.    Has an OV with you on 7/11.    Please advise.

## 2024-07-11 ENCOUNTER — TELEPHONE (OUTPATIENT)
Dept: CARDIOLOGY CLINIC | Facility: CLINIC | Age: 59
End: 2024-07-11

## 2024-07-11 NOTE — TELEPHONE ENCOUNTER
Patient called to cancel today's OV with Asteralysa García due to an episode of dizziness he experienced right before he called to cancel. Stated he did not trust himself to drive.    Rescheduled OV for patient for 7/25 when his daughter can drive him to appointment.    Stated he had an episode of dizziness this AM when he woke also.  Stated his BP has been stable running around 126/70. Pulse - 76.     Has not taken BP when feeling dizzy & advised patient to so to monitor changes.  Patient verbalized understanding.    Today's Wt - 325 lb.    Patient stated he thinks the episodes of dizziness are from taking Metoprolol.    Please advise.

## 2024-07-12 NOTE — TELEPHONE ENCOUNTER
Patient called stating he completed Cardiomems reading, 15 on [employer] website.    Advised patient to call office if dizziness or other cardiac symptoms return or go to ED for evaluation.    Patient verbalized understanding.    Patient agreed to move OV sooner to 7/19.

## 2024-07-12 NOTE — TELEPHONE ENCOUNTER
Returned call to patient who stated he has not yet gotten CardioMEMES reading today but will do so & return call to report it was completed.    Read patient Aster García PA-C orders.    Patient verbalized understanding.    There are no appointments open for anyone next week.

## 2024-07-12 NOTE — TELEPHONE ENCOUNTER
Returned call to patient who stated he had only 1 episode of dizziness when he got out of bed this morning.     Stated his HR has been between   70 - 76.   Denied any chest pain or discomfort. Denied edema to lower extremities or abdominal bloating.    Stated SOB remains the same as it has been, with exertion. Sleeps on the couch propped up.      Cardiomems readings elevated 7/9 - 20, 7/10 - 22, with his goal at 15. Advised patient to get readings daily.    Patient verbalized understanding.

## 2024-07-14 ENCOUNTER — NURSE TRIAGE (OUTPATIENT)
Dept: OTHER | Facility: OTHER | Age: 59
End: 2024-07-14

## 2024-07-14 NOTE — TELEPHONE ENCOUNTER
"Reason for Disposition   [1] Systolic BP  >= 160 OR Diastolic >= 100 AND [2] cardiac or neurologic symptoms (e.g., chest pain, difficulty breathing, unsteady gait, blurred vision)    Answer Assessment - Initial Assessment Questions  1. BLOOD PRESSURE: \"What is the blood pressure?\" \"Did you take at least two measurements 5 minutes apart?\"      Left arm 154/147 HR 50    Right arm 131/75 HR 47      Left arm  127/86 HR 73     Right arm  162/87 HR 79    2. ONSET: \"When did you take your blood pressure?\"      5 mins ago    3. HOW: \"How did you obtain the blood pressure?\" (e.g., visiting nurse, automatic home BP monitor)      Home BP monitor    4. HISTORY: \"Do you have a history of high blood pressure?\"      Yes    5. MEDICATIONS: \"Are you taking any medications for blood pressure?\" \"Have you missed any doses recently?\"      Metoprolol     6. OTHER SYMPTOMS: \"Do you have any symptoms?\" (e.g., headache, chest pain, blurred vision, difficulty breathing, weakness)      Dizziness x past few weeks but worse today. Reports feeling like he is going to pass out.    Protocols used: Blood Pressure - High-ADULT-AH    "

## 2024-07-14 NOTE — TELEPHONE ENCOUNTER
RN advised patient to proceed to ED for varying blood pressures, heart rates and worsening dizziness. Pt states he will continue monitoring blood pressures closely at home and will go to the ED if varying blood pressures and symptoms continue.

## 2024-07-15 ENCOUNTER — TELEPHONE (OUTPATIENT)
Dept: CARDIOLOGY CLINIC | Facility: CLINIC | Age: 59
End: 2024-07-15

## 2024-07-15 NOTE — TELEPHONE ENCOUNTER
Returned call to patient & left him a message reading Dr Wilcox orders & instructions, advising patient to return call to office to verify he received Dr Wilcox orders & message.

## 2024-07-15 NOTE — TELEPHONE ENCOUNTER
Patient returned call & recited Dr Wilcox orders & verbalized understanding.    Will go for BMP tomorrow or Wednesday.

## 2024-07-15 NOTE — PROGRESS NOTES
Heart Failure Outpatient Progress Note - Mesfin Cano 59 y.o. male MRN: [id number]    @ Encounter: [id number]      Assessment/Plan:    Patient Active Problem List    Diagnosis Date Noted    Diabetic neuropathy (HCC) 12/23/2023    Eosinophilia 10/18/2023    Anemia due to chronic kidney disease 10/16/2023    Nasal congestion 10/06/2023    Loose stools 04/17/2023    Chronic diastolic CHF (HCC) 04/10/2023    Diabetic polyneuropathy associated with type 2 diabetes mellitus (HCC) 12/20/2022    CKD (chronic kidney disease) stage 3 (HCC) 09/16/2022    Presence of CardioMEMS HF system 03/09/2022    Paroxysmal atrial fibrillation (HCC) 03/01/2022    Hypokalemia 11/14/2021    Severe persistent asthma 10/11/2021    HNP (herniated nucleus pulposus), lumbar 02/23/2021    Foraminal stenosis of lumbar region 02/23/2021    VICKY (obstructive sleep apnea)     Hyperlipidemia 04/18/2019    Primary osteoarthritis of both knees 06/14/2018    Irritable bowel syndrome with diarrhea 01/30/2018    Primary hypertension 01/30/2018    Vitamin B12 deficiency 03/08/2016    Vitamin D deficiency 03/08/2016    Morbid obesity (HCC) 02/23/2016    Primary osteoarthritis of right knee 10/15/2013    Bilateral leg edema 07/02/2024    Abnormal stress test 06/26/2024    Chest pain 06/25/2024    Uncontrolled atrial flutter (HCC) 06/18/2024    Moderate persistent asthma without complication 06/18/2024    Status post cholecystectomy 02/17/2024     Syncope with fall  -Had isolated episode last week at home  -unconscious for a few seconds per patient report  -possibly 2/2 orthostatic hypotension  -will place one month zio to assess for malignant arrhythmias as well as Afib recurrence/burden     Abnormal nuclear stress test  -Prior [facility] from 2022 showing no obstructive CAD  -Has had no further symptoms since admission  -outpatient coronary CTA recommended by gen cardiology team. Patient will discuss need for this at his next visit with Dr. Wilcox.     Chronic HFpEF,  Stage C, NYHA III.    -PAD above goal since end June in the low 20's. Weight up a few pounds  -instructed to take Metolazone 2.5 mg as needed. Will dose today  -Lab work to be drawn today as well.     Weigth at discharge 343 lbs, 338 lbs, 335, 346, discharge 328, today, 330 lbs    Volume management :  Bumex 6 mg PO BID, Jardiance 10 mg daily, Spironolactone 25 mg daily, Metolazone 2.5 mg PRN    Cardiomems Data: PAD Goal- 15 mmhg  7/15/24 PAD 22    NPI 6/25/24:  There is a left ventricular perfusion defect that is medium in size with moderate reduction in uptake present in the entire inferior and inferolateral location(s) that is partially reversible. The possibility of this defect being caused by diaphragmatic attenuation artifact cannot be completely excluded.     Echo 6/20/24 EF 55%, akinetic basal inferolateral and mid inferolateral     RHC with Cardiomems calibration:  CardioMEMS rep Maciel Shabazz was present throughout the case and provided the simultaneous MEMS interrogation data.     -140 during case  HR 74  O2 sat 97%  Hgb 10.5     RA 8  RV 30/8  PA 28/12, 17  PCWP 10-12 (multiple checks)  TPG 5  PA sat 59% (multiple checks)  Travis CO/CI: 6.6/2.3  TD CO/CI: 7.43/2.78     Simultaneous CardioMEMS data:  Mean: 26/10/15  End expiration on MEMS waveform: 28/10      Impression/Recommendations:  The patient tolerated the procedure well; no immediate complications.  Normal biv filling pressures (after inpt diuresis).  Normal CO/CI.  CardioMEMS data correlates to RHC.  s/p CardioMems implant 3/9/22, GOAL PAD 15-18  PAD 16 10/6, 14 10/5.      BNP 10/6/23: 93  122  233      Studies      LHC 9/6/22: No obstructive CAD     Pharm Nuc Stress 9/2/22: Abnormal study due to the presence of an inferior and inferolateral infarct without significant ischemia.     RHC 3/9/22:   RA 4   RV 35/4   PA 35/12/21   PCWP 10   PA sat 64%   Travis CO 5.56 L/min   Travis CI 2.2L/min/m2   PVR 1.97 SAGASTUME      TTE 4/11/2023: LVEF 50%.  Normal  "wall motion.  Grade 2 DD.  RV cavity size and systolic function normal.  LA mildly dilated.  Trace MR, TR.  Normal IVC.  TTE 03/25/2020: LVEF 40-45%. LVIDd 6.12 cm. Normal RV.    TTE 07/19/2021: LVEF 50%. LVIDd 6.13 cm. Grade 1 DD. Normal RV. Trace MR and TR. Mildly dilated IVC.    TTE 11/12/2021: LVEF 55%. LVIDd 6.0 cm. Grade 1 DD. Normal RV. Trace TR.           Atrial fibrillation/flutter with RVR   -12 lead today demonstrating SR with PACs in bigeminal pattern. HR 86 bpm.   [id number] = 3 (HF, HTN, DM).   Anticoagulation on Eliquis.    Rate control: Metop succinate 50 mg daily   Rhythm control:S/P DCCV     Hypertension, controlled overall. Mildly elevated in office today. May be from being on Prednisone.   Rx: amlodipine 5mg daily      Hyperlipidemia   11/22/22:   HDL 55 LDL 85  Rx: atorvastatin 10 mg daily.        Diabetes mellitus, type II     Non-insulin dependent.   Lab Results   Component Value Date    HGBA1C 8.3 (H) 06/19/2024           Obstructive sleep apnea  Not currently using CPAP given machine recall.   Chronic respiratory failure   Asthma, moderate persistent    S/p gastric bypass (Samuel-en-Y)surgery    History of tobacco abuse   Carpal tunnel syndrome, bilateral    CKD, recent baseline 1.5-2.0. Stable.   Eosinophilia/anemia. Unclear etiology. For BMB in upcoming weeks. Follows with hem/onc.      HPI:  Mesfin Cano is a 58-year-old man with a PMH as above who presents to the office for hospital follow-up. Follows with Dr. Wilcox.      01/30/2024 with DC: \"Here for follow-up.  Reports overall doing well.  Took metolazone this morning 5 mg weight up 3 lbs from yesterday.  PAD this morning when CardioMEMS 12 mmHg.  Recently between 11-13, goal 15.  No worsening leg swelling or abdominal distention.  No worsening shortness of breath on exertion.  Adherent to medications.  Still working on getting CPAP. \"     Admitted to [facility] in February for acute gallstone pancreatitis. Underwent open " cholecystectomy with gastric repair.      05/24/2024: Patient presents for follow-up. No current cardiac complaints. Continues with same TELLES with stairs and longer distances. Denies worsening LE swelling or abdominal bloating. CardioMEMS PAd reading of 15 on 05/22. Has not been taking Entresto since 01/2024 due to cost ($175/month). Has not required PRN metolazone in 2-3 months.     7/18/24. Presents for hospital follow up. Recently presented to the hospital on 6/18/2024 due to 1 week history of chest pain and shortness of breath.  In the ER he was noted to have uncontrolled A-fib/flutter.  Underwent cardioversion after which symptoms resolved.  Patient continued with chest pain and stress test was performed which was abnormal as above, however cardiology feels similar compared to prior and recommending outpatient CTA.  Was euvolemic while admitted.  Would need repeat sleep study as an outpatient. Then presented to the ED again following discharge with chest pain. Workup unremarkable other than hypokalemia. Today he reports feeling a little better. No further chest pain. Did have an episode of syncope last week at home. Said he felt his visual fields narrow and shortly thereafter fell to the ground. Was only out for a few seconds. No further episodes since then.        Past Medical History:   Diagnosis Date    Acute gastritis without hemorrhage     last assessed 3/24/17    Asthma     Atrial fibrillation (HCC)     Bilateral leg edema 02/19/2020    CHF (congestive heart failure) (HCC)     Coronary artery disease     Daytime sleepiness 07/17/2019    Diabetes mellitus (HCC)     Dyspnea on exertion 10/11/2021    Edema of both feet 01/23/2020    Gastric bypass status for obesity     Hyperlipidemia     Hypertension     Hypokalemia 11/14/2021    Impaired fasting glucose     last assessed 5/10/17    Knee sprain, bilateral 06/14/2018    Leg cramps 06/16/2022    Obesity     Viral gastroenteritis     last assessed 11/4/16        12 point ROS negative other than that stated in HPI    Allergies   Allergen Reactions    Azithromycin Other (See Comments)     Shaky, uneasy feeling     Bactrim [Sulfamethoxazole-Trimethoprim] Other (See Comments)     shakiness     .    Current Outpatient Medications:     Accu-Chek FastClix Lancets MISC, Test blood sugar twice daily, Disp: 200 each, Rfl: 3    acetaminophen (TYLENOL) 325 mg tablet, Take 2 tablets (650 mg total) by mouth every 6 (six) hours as needed for mild pain, headaches or fever, Disp: , Rfl:     albuterol (PROVENTIL HFA,VENTOLIN HFA) 90 mcg/act inhaler, Inhale 2 puffs every 4 (four) hours as needed for wheezing or shortness of breath, Disp: 18 g, Rfl: 5    aluminum-magnesium hydroxide 200-200 MG/5ML suspension, Take 5 mL by mouth every 6 (six) hours as needed for heartburn, Disp: 355 mL, Rfl: 0    atorvastatin (LIPITOR) 10 mg tablet, TAKE 1 TABLET BY MOUTH EVERY DAY, Disp: 90 tablet, Rfl: 3    Blood Glucose Monitoring Suppl (Accu-Chek Guide) w/Device KIT, Use 2 (two) times a day Test blood sugar twice daily, Disp: 1 kit, Rfl: 0    budesonide-formoterol (Symbicort) 160-4.5 mcg/act inhaler, Inhale 2 puffs 2 (two) times a day Rinse mouth after use., Disp: 30.6 g, Rfl: 2    bumetanide (BUMEX) 2 mg tablet, Take 3 tablets (6 mg total) by mouth 2 (two) times a day, Disp: 540 tablet, Rfl: 1    Eliquis 5 MG, TAKE 1 TABLET BY MOUTH TWICE A DAY, Disp: 60 tablet, Rfl: 5    Empagliflozin (JARDIANCE) 10 MG TABS tablet, Take 1 tablet (10 mg total) by mouth daily, Disp: 30 tablet, Rfl: 11    EPINEPHrine (EPIPEN) 0.3 mg/0.3 mL SOAJ, Inject 0.3 mL (0.3 mg total) into a muscle once for 1 dose, Disp: 0.6 mL, Rfl: 0    famotidine (PEPCID) 20 mg tablet, Take 1 tablet (20 mg total) by mouth 2 (two) times a day, Disp: 30 tablet, Rfl: 0    fluticasone (FLONASE) 50 mcg/act nasal spray, 1 spray into each nostril 2 (two) times a day, Disp: , Rfl: 0    Klor-Con M20 20 MEQ tablet, TAKE 2 TABLETS BY MOUTH 2 TIMES A  DAY., Disp: 360 tablet, Rfl: 2    loratadine (CLARITIN) 10 mg tablet, Take 1 tablet (10 mg total) by mouth daily Do not start before October 13, 2023., Disp: , Rfl: 0    metolazone (ZAROXOLYN) 2.5 mg tablet, Take 2 tablets (5 mg total) by mouth if needed (weight gain of 3+ lbs in 24 hours, 5+ lbs in one week or signs of worsening fluid retention) Take 20-30 minutes before Bumex dose and with additional potassium, Disp: , Rfl:     metoprolol succinate (TOPROL-XL) 50 mg 24 hr tablet, Take 1 tablet (50 mg total) by mouth daily, Disp: 30 tablet, Rfl: 5    montelukast (SINGULAIR) 10 mg tablet, Take 1 tablet (10 mg total) by mouth daily at bedtime, Disp: 90 tablet, Rfl: 1    nitroglycerin (NITROSTAT) 0.4 mg SL tablet, Place 1 tablet (0.4 mg total) under the tongue every 5 (five) minutes as needed for chest pain for up to 50 doses, Disp: 50 tablet, Rfl: 0    omeprazole (PriLOSEC) 20 mg delayed release capsule, Take 1 capsule (20 mg total) by mouth daily for 14 days, Disp: 14 capsule, Rfl: 0    pregabalin (LYRICA) 50 mg capsule, Take 1 caps. at bedtime, Disp: 30 capsule, Rfl: 5    sitaGLIPtin (Januvia) 100 mg tablet, TAKE 1/2 TABLET BY MOUTH EVERY DAY, Disp: 15 tablet, Rfl: 5    spironolactone (ALDACTONE) 25 mg tablet, TAKE 1 TABLET (25 MG TOTAL) BY MOUTH DAILY., Disp: 90 tablet, Rfl: 1    tiotropium (Spiriva Respimat) 1.25 MCG/ACT AERS inhaler, Inhale 2 puffs daily, Disp: 4 g, Rfl: 0    Social History     Socioeconomic History    Marital status: /Civil Union     Spouse name: Not on file    Number of children: Not on file    Years of education: Not on file    Highest education level: Not on file   Occupational History    Not on file   Tobacco Use    Smoking status: Former     Current packs/day: 1.00     Average packs/day: 1 pack/day for 5.0 years (5.0 ttl pk-yrs)     Types: Pipe, Cigars, Cigarettes     Start date: 2016     Quit date: 1/1/2021     Passive exposure: Never    Smokeless tobacco: Former    Tobacco  comments:     cigar once a day   Vaping Use    Vaping status: Never Used   Substance and Sexual Activity    Alcohol use: Yes     Alcohol/week: 2.0 standard drinks of alcohol     Types: 2 Shots of liquor per week     Comment: social    Drug use: No    Sexual activity: Yes     Partners: Female     Birth control/protection: None   Other Topics Concern    Not on file   Social History Narrative    Not on file     Social Determinants of Health     Financial Resource Strain: Not on file   Food Insecurity: No Food Insecurity (2/8/2024)    Hunger Vital Sign     Worried About Running Out of Food in the Last Year: Never true     Ran Out of Food in the Last Year: Never true   Transportation Needs: No Transportation Needs (2/8/2024)    PRAPARE - Transportation     Lack of Transportation (Medical): No     Lack of Transportation (Non-Medical): No   Physical Activity: Not on file   Stress: Not on file   Social Connections: Not on file   Intimate Partner Violence: Not on file   Housing Stability: Low Risk  (2/8/2024)    Housing Stability Vital Sign     Unable to Pay for Housing in the Last Year: No     Number of Times Moved in the Last Year: 1     Homeless in the Last Year: No   Recent Concern: Housing Stability - High Risk (12/27/2023)    Housing Stability Vital Sign     Unable to Pay for Housing in the Last Year: Yes     Number of Places Lived in the Last Year: 1     Unstable Housing in the Last Year: No       Family History   Problem Relation Age of Onset    Stroke Mother     Hypertension Father     Cancer Father     COPD Father        Physical Exam:    Vitals: There were no vitals taken for this visit.    Wt Readings from Last 3 Encounters:   06/26/24 (!) 149 kg (328 lb 3.2 oz)   05/24/24 (!) 150 kg (331 lb 9.6 oz)   02/20/24 (!) 151 kg (332 lb 12.8 oz)       GEN: Mesfin Cano appears well, alert and oriented x 3, pleasant and cooperative   HEENT: pupils equal, round, and reactive to light; extraocular muscles intact  NECK:  supple, no carotid bruits   HEART: regular rhythm, normal S1 and S2, no murmurs, clicks, gallops or rubs, JVP is at mid neck  LUNGS: few wheezes at bases  ABDOMEN: normal bowel sounds, soft, no tenderness, no distention  EXTREMITIES: peripheral pulses normal; no clubbing, cyanosis, trace BLLE edema around the shin  NEURO: no focal findings   SKIN: normal without suspicious lesions on exposed skin    Labs & Results:    Chemistry        Component Value Date/Time    K 3.1 (L) 06/27/2024 1854    K 4.8 04/11/2022 0325     06/27/2024 1854     04/11/2022 0325    CO2 21 06/27/2024 1854    CO2 22 02/07/2024 2351    CO2 27 04/11/2022 0325    BUN 18 06/27/2024 1854    BUN 31 (H) 04/11/2022 0325    CREATININE 1.28 06/27/2024 1854    CREATININE 1.59 (H) 04/11/2022 0325        Component Value Date/Time    CALCIUM 7.5 (L) 06/27/2024 1854    CALCIUM 8.6 04/11/2022 0325    ALKPHOS 87 06/27/2024 1854    ALKPHOS Request credited. 04/08/2022 2130    AST 13 06/27/2024 1854    AST Request credited. 04/08/2022 2130    ALT 9 06/27/2024 1854    ALT Request credited. 04/08/2022 2130        Lab Results   Component Value Date    WBC 8.64 06/27/2024    HGB 8.2 (L) 06/27/2024    HCT 28.0 (L) 06/27/2024    MCV 79 (L) 06/27/2024     06/27/2024     Lab Results   Component Value Date     (H) 02/04/2024      Lab Results   Component Value Date    LDLCALC 86 10/06/2023     Lab Results   Component Value Date    [id number] 2.896 06/18/2024       EKG personally reviewed by RODRIGUE Rodriges.   No results found for this visit on 07/19/24.     Counseling / Coordination of Care  Total face to face time spent with patient 20 minutes.  An additional 10 minutes was spent for chart/data review and visit preparation.       Thank you for the opportunity to participate in the care of this patient.    RODRIGUE Rodriges

## 2024-07-15 NOTE — TELEPHONE ENCOUNTER
F/U call to patient this AM, as per instructions by Dr Wilcox, to obtain patient's most recent Cardiomems reading. Today's Cardiomemes was elevated - 22.    Patient denied KIERRA or an increase in SOB.   Wt - 327 lb.   Left Arm BP - 136/68. Right Arm BP - 156/104. HR - 87.      Patient stated his AM symptoms of extreme dizziness & feeling lightheaded continues when getting OOB in the AM, changing positions from lying to standing Stated he needs to stand for 30 seconds before walking. Symptoms decrease but continue through his AM care, lasting until he sits down, subsiding after about 10 minutes sitting.    Stated he continues to get another daily episode of lightheadedness & dizziness in the afternoon. Stated it doesn't matter whether he's sitting or standing, he gets a real weird feeling in his head, like he's going to pass out, lasting about 5 minutes.    Taking all cardiac medications as ordered.    Next OV with RODRIGUE David on 7/19 & with you on 8/20.    Please advise.

## 2024-07-17 NOTE — TELEPHONE ENCOUNTER
F/U call to patient who stated his CardioMems is acting up, & he could not get a reading today. Stated he called tech support who stated Cardiomems should be up & running tomorrow.     Patient stated he will check tomorrow. Also stated he will get lab work tomorrow.    Will place a F/U call tomorrow.

## 2024-07-18 ENCOUNTER — TELEPHONE (OUTPATIENT)
Dept: CARDIOLOGY CLINIC | Facility: CLINIC | Age: 59
End: 2024-07-18

## 2024-07-19 ENCOUNTER — OFFICE VISIT (OUTPATIENT)
Dept: CARDIOLOGY CLINIC | Facility: CLINIC | Age: 59
End: 2024-07-19
Payer: COMMERCIAL

## 2024-07-19 ENCOUNTER — APPOINTMENT (OUTPATIENT)
Dept: LAB | Facility: CLINIC | Age: 59
End: 2024-07-19
Payer: COMMERCIAL

## 2024-07-19 VITALS
HEART RATE: 71 BPM | HEIGHT: 73 IN | OXYGEN SATURATION: 97 % | DIASTOLIC BLOOD PRESSURE: 66 MMHG | BODY MASS INDEX: 41.75 KG/M2 | WEIGHT: 315 LBS | SYSTOLIC BLOOD PRESSURE: 120 MMHG

## 2024-07-19 DIAGNOSIS — I50.32 CHRONIC DIASTOLIC CONGESTIVE HEART FAILURE (HCC): ICD-10-CM

## 2024-07-19 DIAGNOSIS — R07.9 CHEST PAIN, UNSPECIFIED TYPE: Primary | ICD-10-CM

## 2024-07-19 DIAGNOSIS — I50.30 DIASTOLIC CONGESTIVE HEART FAILURE, UNSPECIFIED HF CHRONICITY (HCC): ICD-10-CM

## 2024-07-19 DIAGNOSIS — N18.32 STAGE 3B CHRONIC KIDNEY DISEASE (HCC): ICD-10-CM

## 2024-07-19 DIAGNOSIS — E87.6 HYPOKALEMIA: ICD-10-CM

## 2024-07-19 DIAGNOSIS — I48.92 UNCONTROLLED ATRIAL FLUTTER (HCC): ICD-10-CM

## 2024-07-19 DIAGNOSIS — R55 SYNCOPE, UNSPECIFIED SYNCOPE TYPE: ICD-10-CM

## 2024-07-19 LAB
ANION GAP SERPL CALCULATED.3IONS-SCNC: 9 MMOL/L (ref 4–13)
BUN SERPL-MCNC: 26 MG/DL (ref 5–25)
CALCIUM SERPL-MCNC: 8.3 MG/DL (ref 8.4–10.2)
CHLORIDE SERPL-SCNC: 100 MMOL/L (ref 96–108)
CO2 SERPL-SCNC: 28 MMOL/L (ref 21–32)
CREAT SERPL-MCNC: 1.47 MG/DL (ref 0.6–1.3)
GFR SERPL CREATININE-BSD FRML MDRD: 52 ML/MIN/1.73SQ M
GLUCOSE P FAST SERPL-MCNC: 156 MG/DL (ref 65–99)
POTASSIUM SERPL-SCNC: 4.9 MMOL/L (ref 3.5–5.3)
SODIUM SERPL-SCNC: 137 MMOL/L (ref 135–147)

## 2024-07-19 PROCEDURE — 99214 OFFICE O/P EST MOD 30 MIN: CPT | Performed by: NURSE PRACTITIONER

## 2024-07-19 PROCEDURE — 80048 BASIC METABOLIC PNL TOTAL CA: CPT

## 2024-07-19 PROCEDURE — 36415 COLL VENOUS BLD VENIPUNCTURE: CPT

## 2024-07-19 PROCEDURE — 93000 ELECTROCARDIOGRAM COMPLETE: CPT | Performed by: NURSE PRACTITIONER

## 2024-07-19 NOTE — PATIENT INSTRUCTIONS
Weigh yourself daily  If you gain 3 lbs in one day or 5 lbs in one week, please call the office at [phone] and ask for a nurse or the heart failure nurse  Keep your sodium intake to <2 grams, (2000 mg) per day, and fluids <2 Liters (2000 ml) per day. This is around 6-7, 8 oz glasses of fluid per day    Take a Metolazone today with extra potassium  Schedule Zio patch.   Plan to discuss CTA with Dr. Wilcox.   Get lab work today.

## 2024-07-26 ENCOUNTER — TELEPHONE (OUTPATIENT)
Dept: CARDIOLOGY CLINIC | Facility: CLINIC | Age: 59
End: 2024-07-26

## 2024-07-26 NOTE — TELEPHONE ENCOUNTER
F/U call to patient regarding elevated  CardioMems readings since 7/15 - 22 & as high as 25 on 7/22. Last reading 7/25 - 18.    Patient stated he has been feeling really good. Denies any edema to legs or abdomen bloating. Stated his weight is back down.  Today's Wt - 326 lb.  BP - 128/80.    Stated he is wearing the Zio patch since Tuesday, 7/23. Stated experiencing baseline exertional dyspnea.     Taking cardiac medications as ordered.  Following < 2 g sodium diet & fluid restriction of 6 - 7, 8 oz glasses of fluid per day.    Got lab work 7/19 as instructed by RODRIGUE David @ last OV 7/19.    Will call CHF nurses with any cardiac symptoms or weight increase; 3 lbs overnight, 5 lbs in 1 week.

## 2024-07-30 ENCOUNTER — HOSPITAL ENCOUNTER (INPATIENT)
Facility: HOSPITAL | Age: 59
LOS: 5 days | Discharge: HOME | DRG: 291 | End: 2024-08-04
Attending: EMERGENCY MEDICINE | Admitting: INTERNAL MEDICINE
Payer: COMMERCIAL

## 2024-07-30 ENCOUNTER — TELEPHONE (OUTPATIENT)
Dept: CARDIOLOGY CLINIC | Facility: CLINIC | Age: 59
End: 2024-07-30

## 2024-07-30 ENCOUNTER — APPOINTMENT (EMERGENCY)
Dept: RADIOLOGY | Facility: HOSPITAL | Age: 59
DRG: 291 | End: 2024-07-30
Payer: COMMERCIAL

## 2024-07-30 DIAGNOSIS — R60.0 LOWER LEG EDEMA: ICD-10-CM

## 2024-07-30 DIAGNOSIS — R06.02 SOB (SHORTNESS OF BREATH): ICD-10-CM

## 2024-07-30 DIAGNOSIS — I50.9 ACUTE ON CHRONIC CONGESTIVE HEART FAILURE, UNSPECIFIED HEART FAILURE TYPE (HCC): Primary | ICD-10-CM

## 2024-07-30 DIAGNOSIS — I50.32 CHRONIC HEART FAILURE WITH PRESERVED EJECTION FRACTION (HCC): ICD-10-CM

## 2024-07-30 DIAGNOSIS — I50.33 ACUTE ON CHRONIC DIASTOLIC CONGESTIVE HEART FAILURE (HCC): ICD-10-CM

## 2024-07-30 LAB
2HR DELTA HS TROPONIN: 1 NG/L
4HR DELTA HS TROPONIN: 0 NG/L
ALBUMIN SERPL BCG-MCNC: 3.6 G/DL (ref 3.5–5)
ALP SERPL-CCNC: 95 U/L (ref 34–104)
ALT SERPL W P-5'-P-CCNC: 6 U/L (ref 7–52)
ANION GAP SERPL CALCULATED.3IONS-SCNC: 11 MMOL/L (ref 4–13)
AST SERPL W P-5'-P-CCNC: 9 U/L (ref 13–39)
ATRIAL RATE: 80 BPM
ATRIAL RATE: 81 BPM
BASOPHILS # BLD AUTO: 0.07 THOUSANDS/ÂΜL (ref 0–0.1)
BASOPHILS NFR BLD AUTO: 1 % (ref 0–1)
BILIRUB SERPL-MCNC: 0.4 MG/DL (ref 0.2–1)
BNP SERPL-MCNC: 267 PG/ML (ref 0–100)
BUN SERPL-MCNC: 24 MG/DL (ref 5–25)
CALCIUM SERPL-MCNC: 8.1 MG/DL (ref 8.4–10.2)
CARDIAC TROPONIN I PNL SERPL HS: 13 NG/L
CARDIAC TROPONIN I PNL SERPL HS: 13 NG/L
CARDIAC TROPONIN I PNL SERPL HS: 14 NG/L
CHLORIDE SERPL-SCNC: 104 MMOL/L (ref 96–108)
CO2 SERPL-SCNC: 20 MMOL/L (ref 21–32)
CREAT SERPL-MCNC: 1.28 MG/DL (ref 0.6–1.3)
EOSINOPHIL # BLD AUTO: 0.59 THOUSAND/ÂΜL (ref 0–0.61)
EOSINOPHIL NFR BLD AUTO: 7 % (ref 0–6)
ERYTHROCYTE [DISTWIDTH] IN BLOOD BY AUTOMATED COUNT: 20.5 % (ref 11.6–15.1)
FLUAV RNA RESP QL NAA+PROBE: NEGATIVE
FLUBV RNA RESP QL NAA+PROBE: NEGATIVE
GFR SERPL CREATININE-BSD FRML MDRD: 61 ML/MIN/1.73SQ M
GLUCOSE SERPL-MCNC: 219 MG/DL (ref 65–140)
HCT VFR BLD AUTO: 33.3 % (ref 36.5–49.3)
HGB BLD-MCNC: 10.1 G/DL (ref 12–17)
IMM GRANULOCYTES # BLD AUTO: 0.06 THOUSAND/UL (ref 0–0.2)
IMM GRANULOCYTES NFR BLD AUTO: 1 % (ref 0–2)
LYMPHOCYTES # BLD AUTO: 1.08 THOUSANDS/ÂΜL (ref 0.6–4.47)
LYMPHOCYTES NFR BLD AUTO: 13 % (ref 14–44)
MCH RBC QN AUTO: 25 PG (ref 26.8–34.3)
MCHC RBC AUTO-ENTMCNC: 30.3 G/DL (ref 31.4–37.4)
MCV RBC AUTO: 82 FL (ref 82–98)
MONOCYTES # BLD AUTO: 0.5 THOUSAND/ÂΜL (ref 0.17–1.22)
MONOCYTES NFR BLD AUTO: 6 % (ref 4–12)
NEUTROPHILS # BLD AUTO: 5.9 THOUSANDS/ÂΜL (ref 1.85–7.62)
NEUTS SEG NFR BLD AUTO: 72 % (ref 43–75)
NRBC BLD AUTO-RTO: 0 /100 WBCS
P AXIS: 29 DEGREES
P AXIS: 35 DEGREES
PLATELET # BLD AUTO: 257 THOUSANDS/UL (ref 149–390)
PMV BLD AUTO: 10.6 FL (ref 8.9–12.7)
POTASSIUM SERPL-SCNC: 4.1 MMOL/L (ref 3.5–5.3)
PR INTERVAL: 164 MS
PR INTERVAL: 166 MS
PROT SERPL-MCNC: 6.3 G/DL (ref 6.4–8.4)
QRS AXIS: 106 DEGREES
QRS AXIS: 112 DEGREES
QRSD INTERVAL: 80 MS
QRSD INTERVAL: 80 MS
QT INTERVAL: 390 MS
QT INTERVAL: 408 MS
QTC INTERVAL: 453 MS
QTC INTERVAL: 470 MS
RBC # BLD AUTO: 4.04 MILLION/UL (ref 3.88–5.62)
RSV RNA RESP QL NAA+PROBE: NEGATIVE
SARS-COV-2 RNA RESP QL NAA+PROBE: NEGATIVE
SODIUM SERPL-SCNC: 135 MMOL/L (ref 135–147)
T WAVE AXIS: 76 DEGREES
T WAVE AXIS: 84 DEGREES
VENTRICULAR RATE: 80 BPM
VENTRICULAR RATE: 81 BPM
WBC # BLD AUTO: 8.2 THOUSAND/UL (ref 4.31–10.16)

## 2024-07-30 PROCEDURE — 99223 1ST HOSP IP/OBS HIGH 75: CPT | Performed by: INTERNAL MEDICINE

## 2024-07-30 PROCEDURE — 94002 VENT MGMT INPAT INIT DAY: CPT

## 2024-07-30 PROCEDURE — 71045 X-RAY EXAM CHEST 1 VIEW: CPT

## 2024-07-30 PROCEDURE — 93005 ELECTROCARDIOGRAM TRACING: CPT

## 2024-07-30 PROCEDURE — 99285 EMERGENCY DEPT VISIT HI MDM: CPT

## 2024-07-30 PROCEDURE — 83880 ASSAY OF NATRIURETIC PEPTIDE: CPT

## 2024-07-30 PROCEDURE — 84484 ASSAY OF TROPONIN QUANT: CPT | Performed by: EMERGENCY MEDICINE

## 2024-07-30 PROCEDURE — 96374 THER/PROPH/DIAG INJ IV PUSH: CPT

## 2024-07-30 PROCEDURE — 36415 COLL VENOUS BLD VENIPUNCTURE: CPT

## 2024-07-30 PROCEDURE — 80053 COMPREHEN METABOLIC PANEL: CPT | Performed by: EMERGENCY MEDICINE

## 2024-07-30 PROCEDURE — 0241U HB NFCT DS VIR RESP RNA 4 TRGT: CPT

## 2024-07-30 PROCEDURE — 99291 CRITICAL CARE FIRST HOUR: CPT | Performed by: EMERGENCY MEDICINE

## 2024-07-30 PROCEDURE — 99223 1ST HOSP IP/OBS HIGH 75: CPT | Performed by: FAMILY MEDICINE

## 2024-07-30 PROCEDURE — 85025 COMPLETE CBC W/AUTO DIFF WBC: CPT | Performed by: EMERGENCY MEDICINE

## 2024-07-30 PROCEDURE — 94760 N-INVAS EAR/PLS OXIMETRY 1: CPT

## 2024-07-30 PROCEDURE — 93010 ELECTROCARDIOGRAM REPORT: CPT | Performed by: INTERNAL MEDICINE

## 2024-07-30 RX ORDER — ONDANSETRON 2 MG/ML
4 INJECTION INTRAMUSCULAR; INTRAVENOUS EVERY 6 HOURS PRN
Status: DISCONTINUED | OUTPATIENT
Start: 2024-07-30 | End: 2024-08-04 | Stop reason: HOSPADM

## 2024-07-30 RX ORDER — PREGABALIN 50 MG/1
50 CAPSULE ORAL
Status: DISCONTINUED | OUTPATIENT
Start: 2024-07-30 | End: 2024-08-04 | Stop reason: HOSPADM

## 2024-07-30 RX ORDER — MONTELUKAST SODIUM 10 MG/1
10 TABLET ORAL
Status: DISCONTINUED | OUTPATIENT
Start: 2024-07-30 | End: 2024-08-04 | Stop reason: HOSPADM

## 2024-07-30 RX ORDER — ALBUTEROL SULFATE 90 UG/1
2 AEROSOL, METERED RESPIRATORY (INHALATION) EVERY 4 HOURS PRN
Status: DISCONTINUED | OUTPATIENT
Start: 2024-07-30 | End: 2024-08-04 | Stop reason: HOSPADM

## 2024-07-30 RX ORDER — FLUTICASONE PROPIONATE 50 MCG
1 SPRAY, SUSPENSION (ML) NASAL 2 TIMES DAILY
Status: DISCONTINUED | OUTPATIENT
Start: 2024-07-30 | End: 2024-08-04 | Stop reason: HOSPADM

## 2024-07-30 RX ORDER — METOPROLOL SUCCINATE 50 MG/1
50 TABLET, EXTENDED RELEASE ORAL DAILY
Status: DISCONTINUED | OUTPATIENT
Start: 2024-07-31 | End: 2024-08-04 | Stop reason: HOSPADM

## 2024-07-30 RX ORDER — ACETAMINOPHEN 325 MG/1
650 TABLET ORAL EVERY 6 HOURS PRN
Status: DISCONTINUED | OUTPATIENT
Start: 2024-07-30 | End: 2024-08-04 | Stop reason: HOSPADM

## 2024-07-30 RX ORDER — BUDESONIDE AND FORMOTEROL FUMARATE DIHYDRATE 160; 4.5 UG/1; UG/1
2 AEROSOL RESPIRATORY (INHALATION) 2 TIMES DAILY
Status: DISCONTINUED | OUTPATIENT
Start: 2024-07-30 | End: 2024-08-04 | Stop reason: HOSPADM

## 2024-07-30 RX ORDER — BUMETANIDE 0.25 MG/ML
10 INJECTION INTRAMUSCULAR; INTRAVENOUS ONCE
Status: COMPLETED | OUTPATIENT
Start: 2024-07-30 | End: 2024-07-30

## 2024-07-30 RX ORDER — SPIRONOLACTONE 25 MG/1
25 TABLET ORAL DAILY
Status: DISCONTINUED | OUTPATIENT
Start: 2024-07-31 | End: 2024-08-04 | Stop reason: HOSPADM

## 2024-07-30 RX ORDER — ATORVASTATIN CALCIUM 10 MG/1
10 TABLET, FILM COATED ORAL DAILY
Status: DISCONTINUED | OUTPATIENT
Start: 2024-07-31 | End: 2024-08-04 | Stop reason: HOSPADM

## 2024-07-30 RX ADMIN — BUDESONIDE AND FORMOTEROL FUMARATE DIHYDRATE 2 PUFF: 160; 4.5 AEROSOL RESPIRATORY (INHALATION) at 19:17

## 2024-07-30 RX ADMIN — MONTELUKAST 10 MG: 10 TABLET, FILM COATED ORAL at 21:56

## 2024-07-30 RX ADMIN — BUMETANIDE 10 MG: 0.25 INJECTION INTRAMUSCULAR; INTRAVENOUS at 15:37

## 2024-07-30 RX ADMIN — PREGABALIN 50 MG: 50 CAPSULE ORAL at 21:56

## 2024-07-30 RX ADMIN — APIXABAN 5 MG: 5 TABLET, FILM COATED ORAL at 19:17

## 2024-07-30 NOTE — CONSULTS
Advanced Heart Failure/Pulmonary Hypertension Consult Note - Mesfin Cano 59 y.o. male MRN: [id number]    Unit/Bed#: ED 06 Encounter: [id number]      Assessment/PLan:    Patient Active Problem List    Diagnosis Date Noted    Bilateral leg edema 07/02/2024    Abnormal stress test 06/26/2024    Chest pain 06/25/2024    Uncontrolled atrial flutter (HCC) 06/18/2024    Moderate persistent asthma without complication 06/18/2024    Status post cholecystectomy 02/17/2024    Diabetic neuropathy (HCC) 12/23/2023    Eosinophilia 10/18/2023    Anemia due to chronic kidney disease 10/16/2023    Nasal congestion 10/06/2023    Loose stools 04/17/2023    Chronic diastolic CHF (HCC) 04/10/2023    Diabetic polyneuropathy associated with type 2 diabetes mellitus (HCC) 12/20/2022    CKD (chronic kidney disease) stage 3 (HCC) 09/16/2022    Presence of CardioMEMS HF system 03/09/2022    Paroxysmal atrial fibrillation (HCC) 03/01/2022    Hypokalemia 11/14/2021    Severe persistent asthma 10/11/2021    HNP (herniated nucleus pulposus), lumbar 02/23/2021    Foraminal stenosis of lumbar region 02/23/2021    VICKY (obstructive sleep apnea)     Hyperlipidemia 04/18/2019    Primary osteoarthritis of both knees 06/14/2018    Irritable bowel syndrome with diarrhea 01/30/2018    Primary hypertension 01/30/2018    Vitamin B12 deficiency 03/08/2016    Vitamin D deficiency 03/08/2016    Morbid obesity (HCC) 02/23/2016    Primary osteoarthritis of right knee 10/15/2013     Acute on Chronic HFpEF, Stage C, NYHA III.    -PAD above goal since end June in the low 20's  -current exacerbation with unclear trigger, possibly dietary or pulmonary component, ?diuretic resistance.  -Receiving Bumex 10 mg IVP now. Assess labs and response in AM  -Will likely need continuous infusion.      Volume management --home :  Bumex 6 mg PO BID, Jardiance 10 mg daily, Spironolactone 25 mg daily, Metolazone 2.5 mg PRN     Cardiomems Data: PAD Goal- 15 mmhg    7/21 -  20.  7/22 - 25.  7/23 - 21.  7/24 - 20.  7/25 - 18.  7/26 - 22.       7/27 - No Reading  7/28 - 20.  7/29 - 21.  7/30 - 18       NPI 6/25/24:  There is a left ventricular perfusion defect that is medium in size with moderate reduction in uptake present in the entire inferior and inferolateral location(s) that is partially reversible. The possibility of this defect being caused by diaphragmatic attenuation artifact cannot be completely excluded.      Echo 6/20/24 EF 55%, akinetic basal inferolateral and mid inferolateral      RHC with Cardiomems calibration:  CardioMEMS rep Maciel Shabazz was present throughout the case and provided the simultaneous MEMS interrogation data.     -140 during case  HR 74  O2 sat 97%  Hgb 10.5     RA 8  RV 30/8  PA 28/12, 17  PCWP 10-12 (multiple checks)  TPG 5  PA sat 59% (multiple checks)  Travis CO/CI: 6.6/2.3  TD CO/CI: 7.43/2.78     Simultaneous CardioMEMS data:  Mean: 26/10/15  End expiration on MEMS waveform: 28/10      Impression/Recommendations:  The patient tolerated the procedure well; no immediate complications.  Normal biv filling pressures (after inpt diuresis).  Normal CO/CI.  CardioMEMS data correlates to RHC.  s/p CardioMems implant 3/9/22, GOAL PAD 15-18  PAD 16 10/6, 14 10/5.      BNP 10/6/23: 93  122  233      Studies  LHC 9/6/22: No obstructive CAD  Pharm Nuc Stress 9/2/22: Abnormal study due to the presence of an inferior and inferolateral infarct without significant ischemia.  RHC 3/9/22:   RA 4   RV 35/4   PA 35/12/21   PCWP 10   PA sat 64%   Travis CO 5.56 L/min   Travis CI 2.2L/min/m2   PVR 1.97 SAGASTUME   TTE 4/11/2023: LVEF 50%.  Normal wall motion.  Grade 2 DD.  RV cavity size and systolic function normal.  LA mildly dilated.  Trace MR, TR.  Normal IVC.  TTE 03/25/2020: LVEF 40-45%. LVIDd 6.12 cm. Normal RV.    TTE 07/19/2021: LVEF 50%. LVIDd 6.13 cm. Grade 1 DD. Normal RV. Trace MR and TR. Mildly dilated IVC.    TTE 11/12/2021: LVEF 55%. LVIDd 6.0 cm. Grade 1 DD.  Normal RV. Trace TR.       Syncope with fall  -Had recent  isolated episode at home  -unconscious for a few seconds per patient report  -possibly 2/2 orthostatic hypotension ?  -Zio patch was placed but fell off    Atrial fibrillation/flutter with RVR   Currently maintaining SR  [id number] = 3 (HF, HTN, DM).   Anticoagulation on Eliquis.    Rate control: Metop succinate 50 mg daily   Rhythm control:S/P DCCV     Hypertension   Hyperlipidemia   Lab Results   Component Value Date    LDLCALC 86 10/06/2023     Rx: atorvastatin 10 mg daily.    Diabetes mellitus, type II     Non-insulin dependent.         Lab Results   Component Value Date     HGBA1C 8.3 (H) 06/19/2024             Obstructive sleep apnea  Not currently using CPAP given machine recall. Needs repeat sleep study  Chronic respiratory failure   Asthma, moderate persistent    S/p gastric bypass (Samuel-en-Y)surgery    History of tobacco abuse   Carpal tunnel syndrome, bilateral    CKD, recent baseline 1.5-2.0. Stable.   Eosinophilia/anemia. Unclear etiology. For BMB in upcoming weeks. Follows with hem/onc.     HPI:  59 year old with PMH as above who presented to the ED today with complaints of worsening SOB, leg swelling and abdominal distension. CardioMems readings above goal since 7/21. He is already on high dose diuretics along with MRA and SGLT2i. He reports no significant dietary indiscretions. Has been using Metolazone more frequently but with inadequate response. Of note, recently admitted on  6/18/2024 due to 1 week history of chest pain and shortness of breath.  In the ER he was noted to have uncontrolled A-fib/flutter.  Underwent cardioversion after which symptoms resolved.  Patient continued with chest pain and stress test was performed which was abnormal as above, however general cardiology team felt results were similar compared to prior and recommended outpatient CTA.  Was euvolemic while admitted.     Work up thus far reveals stable renal function.  CBC WNL. Maintaining SR and normotensive. CXR without acute pulmonary disease. Patient is in no acute distress.     Past Medical History:   Diagnosis Date    Acute gastritis without hemorrhage     last assessed 3/24/17    Asthma     Atrial fibrillation (HCC)     Bilateral leg edema 02/19/2020    CHF (congestive heart failure) (HCC)     Coronary artery disease     Daytime sleepiness 07/17/2019    Diabetes mellitus (HCC)     Dyspnea on exertion 10/11/2021    Edema of both feet 01/23/2020    Gastric bypass status for obesity     Hyperlipidemia     Hypertension     Hypokalemia 11/14/2021    Impaired fasting glucose     last assessed 5/10/17    Knee sprain, bilateral 06/14/2018    Leg cramps 06/16/2022    Obesity     Viral gastroenteritis     last assessed 11/4/16       Review of Systems - Negative except that stated in HPI    Allergies   Allergen Reactions    Azithromycin Other (See Comments)     Shaky, uneasy feeling     Bactrim [Sulfamethoxazole-Trimethoprim] Other (See Comments)     shakiness         Current Facility-Administered Medications:     bumetanide (BUMEX) injection 10 mg, 10 mg, Intravenous, Once, Dinh Greenfield, DO    Current Outpatient Medications:     Accu-Chek FastClix Lancets MISC, Test blood sugar twice daily, Disp: 200 each, Rfl: 3    acetaminophen (TYLENOL) 325 mg tablet, Take 2 tablets (650 mg total) by mouth every 6 (six) hours as needed for mild pain, headaches or fever, Disp: , Rfl:     albuterol (PROVENTIL HFA,VENTOLIN HFA) 90 mcg/act inhaler, Inhale 2 puffs every 4 (four) hours as needed for wheezing or shortness of breath, Disp: 18 g, Rfl: 5    aluminum-magnesium hydroxide 200-200 MG/5ML suspension, Take 5 mL by mouth every 6 (six) hours as needed for heartburn, Disp: 355 mL, Rfl: 0    atorvastatin (LIPITOR) 10 mg tablet, TAKE 1 TABLET BY MOUTH EVERY DAY, Disp: 90 tablet, Rfl: 3    Blood Glucose Monitoring Suppl (Accu-Chek Guide) w/Device KIT, Use 2 (two) times a day Test blood sugar twice daily,  Disp: 1 kit, Rfl: 0    budesonide-formoterol (Symbicort) 160-4.5 mcg/act inhaler, Inhale 2 puffs 2 (two) times a day Rinse mouth after use., Disp: 30.6 g, Rfl: 2    bumetanide (BUMEX) 2 mg tablet, Take 3 tablets (6 mg total) by mouth 2 (two) times a day, Disp: 540 tablet, Rfl: 1    Eliquis 5 MG, TAKE 1 TABLET BY MOUTH TWICE A DAY, Disp: 60 tablet, Rfl: 5    Empagliflozin (JARDIANCE) 10 MG TABS tablet, Take 1 tablet (10 mg total) by mouth daily, Disp: 30 tablet, Rfl: 11    EPINEPHrine (EPIPEN) 0.3 mg/0.3 mL SOAJ, Inject 0.3 mL (0.3 mg total) into a muscle once for 1 dose, Disp: 0.6 mL, Rfl: 0    famotidine (PEPCID) 20 mg tablet, Take 1 tablet (20 mg total) by mouth 2 (two) times a day, Disp: 30 tablet, Rfl: 0    fluticasone (FLONASE) 50 mcg/act nasal spray, 1 spray into each nostril 2 (two) times a day, Disp: , Rfl: 0    Klor-Con M20 20 MEQ tablet, TAKE 2 TABLETS BY MOUTH 2 TIMES A DAY., Disp: 360 tablet, Rfl: 2    loratadine (CLARITIN) 10 mg tablet, Take 1 tablet (10 mg total) by mouth daily Do not start before October 13, 2023., Disp: , Rfl: 0    metolazone (ZAROXOLYN) 2.5 mg tablet, Take 2 tablets (5 mg total) by mouth if needed (weight gain of 3+ lbs in 24 hours, 5+ lbs in one week or signs of worsening fluid retention) Take 20-30 minutes before Bumex dose and with additional potassium, Disp: , Rfl:     metoprolol succinate (TOPROL-XL) 50 mg 24 hr tablet, Take 1 tablet (50 mg total) by mouth daily, Disp: 30 tablet, Rfl: 5    montelukast (SINGULAIR) 10 mg tablet, Take 1 tablet (10 mg total) by mouth daily at bedtime, Disp: 90 tablet, Rfl: 1    nitroglycerin (NITROSTAT) 0.4 mg SL tablet, Place 1 tablet (0.4 mg total) under the tongue every 5 (five) minutes as needed for chest pain for up to 50 doses, Disp: 50 tablet, Rfl: 0    omeprazole (PriLOSEC) 20 mg delayed release capsule, Take 1 capsule (20 mg total) by mouth daily for 14 days, Disp: 14 capsule, Rfl: 0    pregabalin (LYRICA) 50 mg capsule, Take 1 caps. at  bedtime, Disp: 30 capsule, Rfl: 5    sitaGLIPtin (Januvia) 100 mg tablet, TAKE 1/2 TABLET BY MOUTH EVERY DAY, Disp: 15 tablet, Rfl: 5    spironolactone (ALDACTONE) 25 mg tablet, TAKE 1 TABLET (25 MG TOTAL) BY MOUTH DAILY., Disp: 90 tablet, Rfl: 1    tiotropium (Spiriva Respimat) 1.25 MCG/ACT AERS inhaler, Inhale 2 puffs daily, Disp: 4 g, Rfl: 0    Social History     Socioeconomic History    Marital status: /Civil Union     Spouse name: Not on file    Number of children: Not on file    Years of education: Not on file    Highest education level: Not on file   Occupational History    Not on file   Tobacco Use    Smoking status: Former     Current packs/day: 1.00     Average packs/day: 1 pack/day for 5.0 years (5.0 ttl pk-yrs)     Types: Pipe, Cigars, Cigarettes     Start date: 2016     Quit date: 1/1/2021     Passive exposure: Never    Smokeless tobacco: Former    Tobacco comments:     cigar once a day   Vaping Use    Vaping status: Never Used   Substance and Sexual Activity    Alcohol use: Yes     Alcohol/week: 2.0 standard drinks of alcohol     Types: 2 Shots of liquor per week     Comment: social    Drug use: No    Sexual activity: Yes     Partners: Female     Birth control/protection: None   Other Topics Concern    Not on file   Social History Narrative    Not on file     Social Determinants of Health     Financial Resource Strain: Not on file   Food Insecurity: No Food Insecurity (2/8/2024)    Hunger Vital Sign     Worried About Running Out of Food in the Last Year: Never true     Ran Out of Food in the Last Year: Never true   Transportation Needs: No Transportation Needs (2/8/2024)    PRAPARE - Transportation     Lack of Transportation (Medical): No     Lack of Transportation (Non-Medical): No   Physical Activity: Not on file   Stress: Not on file   Social Connections: Not on file   Intimate Partner Violence: Not on file   Housing Stability: Low Risk  (2/8/2024)    Housing Stability Vital Sign     Unable  to Pay for Housing in the Last Year: No     Number of Times Moved in the Last Year: 1     Homeless in the Last Year: No   Recent Concern: Housing Stability - High Risk (12/27/2023)    Housing Stability Vital Sign     Unable to Pay for Housing in the Last Year: Yes     Number of Places Lived in the Last Year: 1     Unstable Housing in the Last Year: No       Family History   Problem Relation Age of Onset    Stroke Mother     Hypertension Father     Cancer Father     COPD Father        Physical Exam:  Central Line (day, reason):  Cotton catheter (day, reason):    Vitals: Blood pressure 109/58, pulse 83, temperature 98 °F (36.7 °C), resp. rate 22, SpO2 96%., There is no height or weight on file to calculate BMI., No intake/output data recorded.  No intake/output data recorded.  Wt Readings from Last 3 Encounters:   07/19/24 (!) 150 kg (330 lb)   06/26/24 (!) 149 kg (328 lb 3.2 oz)   05/24/24 (!) 150 kg (331 lb 9.6 oz)     No intake or output data in the 24 hours ending 07/30/24 1454  No intake/output data recorded.    Vitals:    07/30/24 1340   BP: 109/58   BP Location: Right arm   Pulse: 83   Resp: 22   Temp: 98 °F (36.7 °C)   SpO2: 96%       GEN: Msefin Cano appears well, alert and oriented x 3, pleasant and cooperative   HEENT: pupils equal, round, and reactive to light; extraocular muscles intact  NECK: supple, no carotid bruits   HEART: regular rhythm, normal S1 and S2, no murmurs, clicks, gallops or rubs, JVP is  up  LUNGS: clear to auscultation bilaterally; no wheezes, rales, or rhonchi   ABDOMEN: normal bowel sounds, soft, no tenderness, no distention  EXTREMITIES: peripheral pulses normal; no clubbing, cyanosis, trace edema  NEURO: no focal findings   SKIN: normal without suspicious lesions on exposed skin    Labs & Results:        Results from last 7 days   Lab Units 07/30/24  1355   WBC Thousand/uL 8.20   HEMOGLOBIN g/dL 10.1*   HEMATOCRIT % 33.3*   PLATELETS Thousands/uL 257         Results from last 7  days   Lab Units 07/30/24  1355   POTASSIUM mmol/L 4.1   CHLORIDE mmol/L 104   CO2 mmol/L 20*   BUN mg/dL 24   CREATININE mg/dL 1.28   CALCIUM mg/dL 8.1*   ALK PHOS U/L 95   ALT U/L 6*   AST U/L 9*         Chest X-Ray is obtained; result - reviewed    EKG personally reviewed by RODRIGUE Rodriges.     Counseling / Coordination of Care  Thank you for the opportunity to participate in the care of this patient.    Capri HUSAIN  [facility]

## 2024-07-30 NOTE — ED PROVIDER NOTES
History  Chief Complaint   Patient presents with    Shortness of Breath     Since yesterday afternoon. Pt states that it became worse so he called EMS     59 year old male with pmhx of A-fib on Eliquis, CHF, CAD, HTN, HLD, HTN and obesity presents to the ED via EMS for evaluation of shortness of breath since yesterday while watching TV. Pt notes he needed to use extra pillows to sleep last night. Pt woke up this morning with worsening SOB. Pt also notes worsening lower extremity edema. Pt have been hospitalized numerous time for similar complaints. Pt normally weights himself but did not do it this AM. Pt denies fever, chills, nausea, vomiting, chest pain, and abdominal pain.       Shortness of Breath  Associated symptoms: no abdominal pain, no chest pain, no cough, no diaphoresis, no ear pain, no fever, no headaches, no rash, no sore throat and no vomiting          Prior to Admission Medications   Prescriptions Last Dose Informant Patient Reported? Taking?   Accu-Chek FastClix Lancets MISC Past Week Self No Yes   Sig: Test blood sugar twice daily   Patient taking differently: Takes blood sugar three times a week provider aware   Blood Glucose Monitoring Suppl (Accu-Chek Guide) w/Device KIT Past Week Self No Yes   Sig: Use 2 (two) times a day Test blood sugar twice daily   EPINEPHrine (EPIPEN) 0.3 mg/0.3 mL SOAJ  Self No No   Sig: Inject 0.3 mL (0.3 mg total) into a muscle once for 1 dose   Eliquis 5 MG Past Week  No Yes   Sig: TAKE 1 TABLET BY MOUTH TWICE A DAY   Empagliflozin (JARDIANCE) 10 MG TABS tablet Past Week Self No Yes   Sig: Take 1 tablet (10 mg total) by mouth daily   Klor-Con M20 20 MEQ tablet Past Week  No Yes   Sig: TAKE 2 TABLETS BY MOUTH 2 TIMES A DAY.   acetaminophen (TYLENOL) 325 mg tablet Past Month  No Yes   Sig: Take 2 tablets (650 mg total) by mouth every 6 (six) hours as needed for mild pain, headaches or fever   albuterol (PROVENTIL HFA,VENTOLIN HFA) 90 mcg/act inhaler Past Week  No Yes    Sig: Inhale 2 puffs every 4 (four) hours as needed for wheezing or shortness of breath   aluminum-magnesium hydroxide 200-200 MG/5ML suspension More than a month  No No   Sig: Take 5 mL by mouth every 6 (six) hours as needed for heartburn   atorvastatin (LIPITOR) 10 mg tablet Past Week  No Yes   Sig: TAKE 1 TABLET BY MOUTH EVERY DAY   budesonide-formoterol (Symbicort) 160-4.5 mcg/act inhaler Past Week  No Yes   Sig: Inhale 2 puffs 2 (two) times a day Rinse mouth after use.   bumetanide (BUMEX) 2 mg tablet Past Week  No Yes   Sig: Take 3 tablets (6 mg total) by mouth 2 (two) times a day   famotidine (PEPCID) 20 mg tablet Past Week  No Yes   Sig: Take 1 tablet (20 mg total) by mouth 2 (two) times a day   Patient taking differently: Take 20 mg by mouth if needed for heartburn As needed twice a day   fluticasone (FLONASE) 50 mcg/act nasal spray Past Month Self No Yes   Si spray into each nostril 2 (two) times a day   Patient taking differently: 1 spray into each nostril if needed for rhinitis As needed twice a day for nasal congestion   loratadine (CLARITIN) 10 mg tablet Past Month Self No Yes   Sig: Take 1 tablet (10 mg total) by mouth daily Do not start before 2023.   Patient taking differently: Take 10 mg by mouth if needed for allergies As needed daily   metolazone (ZAROXOLYN) 2.5 mg tablet Past Week  No Yes   Sig: Take 2 tablets (5 mg total) by mouth if needed (weight gain of 3+ lbs in 24 hours, 5+ lbs in one week or signs of worsening fluid retention) Take 20-30 minutes before Bumex dose and with additional potassium   metoprolol succinate (TOPROL-XL) 50 mg 24 hr tablet Past Week  No Yes   Sig: Take 1 tablet (50 mg total) by mouth daily   montelukast (SINGULAIR) 10 mg tablet Past Week Self No Yes   Sig: Take 1 tablet (10 mg total) by mouth daily at bedtime   nitroglycerin (NITROSTAT) 0.4 mg SL tablet More than a month  No No   Sig: Place 1 tablet (0.4 mg total) under the tongue every 5 (five)  minutes as needed for chest pain for up to 50 doses   omeprazole (PriLOSEC) 20 mg delayed release capsule More than a month  No No   Sig: Take 1 capsule (20 mg total) by mouth daily for 14 days   Patient taking differently: Take 20 mg by mouth if needed (acid reflux)   pregabalin (LYRICA) 50 mg capsule Past Week  No Yes   Sig: Take 1 caps. at bedtime   sitaGLIPtin (Januvia) 100 mg tablet Past Week  No Yes   Sig: TAKE 1/2 TABLET BY MOUTH EVERY DAY   spironolactone (ALDACTONE) 25 mg tablet Past Week  No Yes   Sig: TAKE 1 TABLET (25 MG TOTAL) BY MOUTH DAILY.   tiotropium (Spiriva Respimat) 1.25 MCG/ACT AERS inhaler Past Week  No Yes   Sig: Inhale 2 puffs daily      Facility-Administered Medications: None       Past Medical History:   Diagnosis Date    Acute gastritis without hemorrhage     last assessed 3/24/17    Asthma     Atrial fibrillation (HCC)     Bilateral leg edema 02/19/2020    CHF (congestive heart failure) (HCC)     Coronary artery disease     Daytime sleepiness 07/17/2019    Diabetes mellitus (HCC)     Dyspnea on exertion 10/11/2021    Edema of both feet 01/23/2020    Gastric bypass status for obesity     Hyperlipidemia     Hypertension     Hypokalemia 11/14/2021    Impaired fasting glucose     last assessed 5/10/17    Knee sprain, bilateral 06/14/2018    Leg cramps 06/16/2022    Obesity     Viral gastroenteritis     last assessed 11/4/16       Past Surgical History:   Procedure Laterality Date    CARDIAC CATHETERIZATION N/A 3/9/2022    Procedure: CARDIAC RHC W/ PRESSURE SENSOR;  Surgeon: Aminata Wilcox MD;  Location: [facility];  Service: Cardiology    CARDIAC CATHETERIZATION N/A 9/6/2022    Procedure: Cardiac catheterization;  Surgeon: Yolanda Del Rosario DO;  Location: [facility];  Service: Cardiology    CARDIAC CATHETERIZATION N/A 9/6/2022    Procedure: Cardiac Coronary Angiogram;  Surgeon: Yolanda Del Rosario DO;  [facility]: [facility];  Service: Cardiology    CARDIAC  CATHETERIZATION N/A 10/11/2023    Procedure: Cardiac RHC;  Surgeon: Yoel Darden MD;  Location: [facility];  Service: Cardiology    CARPAL TUNNEL RELEASE      bilateral    CHOLECYSTECTOMY LAPAROSCOPIC N/A 2/7/2024    Procedure: CHOLECYSTECTOMY LAPAROSCOPIC;  Surgeon: Nena Ferguson MD;  Location: [facility];  Service: General    GASTRIC BYPASS  2004    with han en y    HERNIA REPAIR      ventral inscisional    IR BIOPSY BONE MARROW  12/14/2023    LAPAROSCOPIC TRANSGASTRIC ACCESS FOR ERCP N/A 2/7/2024    Procedure: LAPAROSCOPIC TRANSGASTRIC ACCESS FOR ERCP;  Surgeon: Nena Ferguson MD;  Location: [facility];  Service: General    LAPAROSCOPIC TRANSGASTRIC ACCESS FOR ERCP N/A 2/7/2024    Procedure: ERCP, sphincterotomy, stone extraction;  Surgeon: Rey Ferguson MD;  Location: [facility];  Service: Gastroenterology    TONSILLECTOMY         Family History   Problem Relation Age of Onset    Stroke Mother     Hypertension Father     Cancer Father     COPD Father      I have reviewed and agree with the history as documented.    E-Cigarette/Vaping    E-Cigarette Use Never User      E-Cigarette/Vaping Substances    Nicotine No     THC No     CBD No     Flavoring No     Other No     Unknown No      Social History     Tobacco Use    Smoking status: Former     Current packs/day: 1.00     Average packs/day: 1 pack/day for 5.0 years (5.0 ttl pk-yrs)     Types: Pipe, Cigars, Cigarettes     Start date: 2016     Quit date: 1/1/2021     Passive exposure: Never    Smokeless tobacco: Former    Tobacco comments:     cigar once a day   Vaping Use    Vaping status: Never Used   Substance Use Topics    Alcohol use: Yes     Alcohol/week: 2.0 standard drinks of alcohol     Types: 2 Shots of liquor per week     Comment: social    Drug use: No        Review of Systems   Constitutional:  Negative for appetite change, chills, diaphoresis, fever and unexpected weight change.   HENT:  Negative for dental problem, ear pain,  facial swelling, sore throat and trouble swallowing.    Eyes:  Negative for pain and visual disturbance.   Respiratory:  Positive for shortness of breath. Negative for cough and chest tightness.    Cardiovascular:  Positive for leg swelling. Negative for chest pain and palpitations.   Gastrointestinal:  Negative for abdominal distention, abdominal pain, constipation, diarrhea, nausea and vomiting.   Endocrine: Negative for polyuria.   Genitourinary:  Negative for difficulty urinating, dysuria and hematuria.   Musculoskeletal:  Negative for arthralgias and back pain.   Skin:  Negative for color change and rash.   Neurological:  Negative for dizziness, seizures, syncope, light-headedness and headaches.   Psychiatric/Behavioral:  Negative for confusion.    All other systems reviewed and are negative.      Physical Exam  ED Triage Vitals   Temperature Pulse Respirations Blood Pressure SpO2   07/30/24 1340 07/30/24 1340 07/30/24 1340 07/30/24 1340 07/30/24 1340   98 °F (36.7 °C) 83 22 109/58 96 %      Temp Source Heart Rate Source Patient Position - Orthostatic VS BP Location FiO2 (%)   07/31/24 1251 07/30/24 1340 07/30/24 1340 07/30/24 1340 --   Oral Monitor Lying Right arm       Pain Score       07/30/24 1340       5             Orthostatic Vital Signs  Vitals:    07/31/24 1251 07/31/24 1500 07/31/24 1851 07/31/24 2234   BP: 135/66 127/68 125/69 124/68   Pulse: 80 72 83 80   Patient Position - Orthostatic VS:  Sitting Sitting - Orthostatic VS Lying       Physical Exam  Vitals and nursing note reviewed.   Constitutional:       General: He is not in acute distress.     Appearance: Normal appearance. He is well-developed. He is not ill-appearing.   HENT:      Head: Normocephalic and atraumatic.      Right Ear: External ear normal.      Left Ear: External ear normal.      Nose: Nose normal.      Mouth/Throat:      Mouth: Mucous membranes are moist.   Eyes:      General: No scleral icterus.        Right eye: No discharge.       Extraocular Movements: Extraocular movements intact.      Conjunctiva/sclera: Conjunctivae normal.   Cardiovascular:      Rate and Rhythm: Normal rate and regular rhythm.      Pulses: Normal pulses.      Heart sounds: No murmur heard.  Pulmonary:      Effort: Pulmonary effort is normal. No respiratory distress.      Breath sounds: Normal breath sounds. No wheezing.   Chest:      Chest wall: No tenderness.   Abdominal:      General: Abdomen is flat. There is no distension.      Palpations: Abdomen is soft.      Tenderness: There is no abdominal tenderness.   Musculoskeletal:         General: No swelling, deformity or signs of injury. Normal range of motion.      Cervical back: Normal range of motion and neck supple. No rigidity or tenderness.      Right lower le+ Edema present.      Left lower le+ Edema present.   Skin:     General: Skin is warm and dry.      Capillary Refill: Capillary refill takes less than 2 seconds.   Neurological:      General: No focal deficit present.      Mental Status: He is alert and oriented to person, place, and time.   Psychiatric:         Mood and Affect: Mood normal.         ED Medications  Medications   atorvastatin (LIPITOR) tablet 10 mg (10 mg Oral Given 24)   albuterol (PROVENTIL HFA,VENTOLIN HFA) inhaler 2 puff (has no administration in time range)   budesonide-formoterol (SYMBICORT) 160-4.5 mcg/act inhaler 2 puff (2 puffs Inhalation Given 24)   apixaban (ELIQUIS) tablet 5 mg (5 mg Oral Given 24)   Empagliflozin (JARDIANCE) tablet 10 mg (10 mg Oral Given 24)   fluticasone (FLONASE) 50 mcg/act nasal spray 1 spray (1 spray Nasal Not Given 24)   montelukast (SINGULAIR) tablet 10 mg (10 mg Oral Given 24)   metoprolol succinate (TOPROL-XL) 24 hr tablet 50 mg (50 mg Oral Given 24)   pregabalin (LYRICA) capsule 50 mg (50 mg Oral Given 24)   spironolactone (ALDACTONE) tablet 25 mg (25 mg Oral  Given 7/31/24 0930)   umeclidinium 62.5 mcg/actuation inhaler AEPB 1 puff (1 puff Inhalation Given 7/31/24 0929)   ondansetron (ZOFRAN) injection 4 mg (has no administration in time range)   acetaminophen (TYLENOL) tablet 650 mg (has no administration in time range)   insulin lispro (HumALOG/ADMELOG) 100 units/mL subcutaneous injection 2-12 Units (2 Units Subcutaneous Given 7/31/24 1707)   insulin lispro (HumALOG/ADMELOG) 100 units/mL subcutaneous injection 2-12 Units (2 Units Subcutaneous Given 7/31/24 2125)   bumetanide (BUMEX) 12.5 mg infusion 50 mL (1 mg/hr Intravenous New Bag 7/31/24 1752)   bumetanide (BUMEX) injection 10 mg (10 mg Intravenous Given 7/30/24 1537)   magnesium sulfate 2 g/50 mL IVPB (premix) 2 g (2 g Intravenous New Bag 7/31/24 1026)   bumetanide (BUMEX) injection 5 mg (5 mg Intravenous Given 7/31/24 1336)   potassium chloride (Klor-Con M20) CR tablet 20 mEq (20 mEq Oral Given 7/31/24 1342)       Diagnostic Studies  Results Reviewed       Procedure Component Value Units Date/Time    Fingerstick Glucose (POCT) [[id number]]  (Abnormal) Collected: 07/31/24 1208    Lab Status: Final result Specimen: Blood Updated: 07/31/24 1210     POC Glucose 261 mg/dl     Basic metabolic panel [[id number]]  (Abnormal) Collected: 07/31/24 0612    Lab Status: Final result Specimen: Blood from Arm, Right Updated: 07/31/24 0654     Sodium 138 mmol/L      Potassium 4.2 mmol/L      Chloride 101 mmol/L      CO2 27 mmol/L      ANION GAP 10 mmol/L      BUN 27 mg/dL      Creatinine 1.40 mg/dL      Glucose 125 mg/dL      Calcium 8.2 mg/dL      eGFR 55 ml/min/1.73sq m     Narrative:      National Kidney Disease Foundation guidelines for Chronic Kidney Disease (CKD):     Stage 1 with normal or high GFR (GFR > 90 mL/min/1.73 square meters)    Stage 2 Mild CKD (GFR = 60-89 mL/min/1.73 square meters)    Stage 3A Moderate CKD (GFR = 45-59 mL/min/1.73 square meters)    Stage 3B Moderate CKD (GFR = 30-44 mL/min/1.73 square meters)     Stage 4 Severe CKD (GFR = 15-29 mL/min/1.73 square meters)    Stage 5 End Stage CKD (GFR <15 mL/min/1.73 square meters)  Note: GFR calculation is accurate only with a steady state creatinine    Magnesium [[id number]]  (Abnormal) Collected: 07/31/24 0612    Lab Status: Final result Specimen: Blood from Arm, Right Updated: 07/31/24 0654     Magnesium 1.7 mg/dL     Phosphorus [[id number]]  (Normal) Collected: 07/31/24 0612    Lab Status: Final result Specimen: Blood from Arm, Right Updated: 07/31/24 0654     Phosphorus 3.8 mg/dL     CBC (With Platelets) [[id number]]  (Abnormal) Collected: 07/31/24 0612    Lab Status: Final result Specimen: Blood from Arm, Right Updated: 07/31/24 0620     WBC 6.86 Thousand/uL      RBC 3.55 Million/uL      Hemoglobin 8.9 g/dL      Hematocrit 29.4 %      MCV 83 fL      MCH 25.1 pg      MCHC 30.3 g/dL      RDW 20.7 %      Platelets 228 Thousands/uL      MPV 10.4 fL     HS Troponin I 4hr [[id number]]  (Normal) Collected: 07/30/24 2049    Lab Status: Final result Specimen: Blood from Arm, Right Updated: 07/30/24 2120     hs TnI 4hr 13 ng/L      Delta 4hr hsTnI 0 ng/L     HS Troponin I 2hr [[id number]]  (Normal) Collected: 07/30/24 1655    Lab Status: Final result Specimen: Blood from Arm, Left Updated: 07/30/24 1732     hs TnI 2hr 14 ng/L      Delta 2hr hsTnI 1 ng/L     COVID/FLU/RSV [[id number]]  (Normal) Resulted: 07/30/24 1629    Lab Status: Final result Specimen: Nares from Nose Updated: 07/30/24 1629     SARS-CoV-2 Negative     INFLUENZA A PCR Negative     INFLUENZA B PCR Negative     RSV PCR Negative    Narrative:      FOR PEDIATRIC PATIENTS - copy/paste COVID Guidelines URL to browser: [email or web address]    SARS-CoV-2 assay is a Nucleic Acid Amplification assay intended for the  qualitative detection of nucleic acid from SARS-CoV-2 in nasopharyngeal  swabs. Results are for the presumptive identification of SARS-CoV-2 RNA.    Positive  results are indicative of infection with SARS-CoV-2, the virus  causing COVID-19, but do not rule out bacterial infection or co-infection  with other viruses. Laboratories within the [place] and its  territories are required to report all positive results to the appropriate  public health authorities. Negative results do not preclude SARS-CoV-2  infection and should not be used as the sole basis for treatment or other  patient management decisions. Negative results must be combined with  clinical observations, patient history, and epidemiological information.  This test has not been FDA cleared or approved.    This test has been authorized by FDA under an Emergency Use Authorization  (EUA). This test is only authorized for the duration of time the  declaration that circumstances exist justifying the authorization of the  emergency use of an in vitro diagnostic tests for detection of SARS-CoV-2  virus and/or diagnosis of COVID-19 infection under section 564(b)(1) of  the Act, 21 U.S.C. 360bbb-3(b)(1), unless the authorization is terminated  or revoked sooner. The test has been validated but independent review by FDA  and CLIA is pending.    Test performed using [employer] GeneXpert: This RT-PCR assay targets N2,  a region unique to SARS-CoV-2. A conserved region in the E-gene was chosen  for pan-Sarbecovirus detection which includes SARS-CoV-2.    According to CMS-2020-01-R, this platform meets the definition of high-throughput technology.    B-Type Natriuretic Peptide(BNP) [[id number]]  (Abnormal) Collected: 07/30/24 1355    Lab Status: Final result Specimen: Blood from Arm, Left Updated: 07/30/24 1509      pg/mL     HS Troponin 0hr (reflex protocol) [[id number]]  (Normal) Collected: 07/30/24 1355    Lab Status: Final result Specimen: Blood from Arm, Left Updated: 07/30/24 1428     hs TnI 0hr 13 ng/L     Comprehensive metabolic panel [[id number]]  (Abnormal) Collected: 07/30/24 1355    Lab Status: Final  result Specimen: Blood from Arm, Left Updated: 07/30/24 1425     Sodium 135 mmol/L      Potassium 4.1 mmol/L      Chloride 104 mmol/L      CO2 20 mmol/L      ANION GAP 11 mmol/L      BUN 24 mg/dL      Creatinine 1.28 mg/dL      Glucose 219 mg/dL      Calcium 8.1 mg/dL      AST 9 U/L      ALT 6 U/L      Alkaline Phosphatase 95 U/L      Total Protein 6.3 g/dL      Albumin 3.6 g/dL      Total Bilirubin 0.40 mg/dL      eGFR 61 ml/min/1.73sq m     Narrative:      National Kidney Disease Foundation guidelines for Chronic Kidney Disease (CKD):     Stage 1 with normal or high GFR (GFR > 90 mL/min/1.73 square meters)    Stage 2 Mild CKD (GFR = 60-89 mL/min/1.73 square meters)    Stage 3A Moderate CKD (GFR = 45-59 mL/min/1.73 square meters)    Stage 3B Moderate CKD (GFR = 30-44 mL/min/1.73 square meters)    Stage 4 Severe CKD (GFR = 15-29 mL/min/1.73 square meters)    Stage 5 End Stage CKD (GFR <15 mL/min/1.73 square meters)  Note: GFR calculation is accurate only with a steady state creatinine    CBC and differential [[id number]]  (Abnormal) Collected: 07/30/24 1355    Lab Status: Final result Specimen: Blood from Arm, Left Updated: 07/30/24 1405     WBC 8.20 Thousand/uL      RBC 4.04 Million/uL      Hemoglobin 10.1 g/dL      Hematocrit 33.3 %      MCV 82 fL      MCH 25.0 pg      MCHC 30.3 g/dL      RDW 20.5 %      MPV 10.6 fL      Platelets 257 Thousands/uL      nRBC 0 /100 WBCs      Segmented % 72 %      Immature Grans % 1 %      Lymphocytes % 13 %      Monocytes % 6 %      Eosinophils Relative 7 %      Basophils Relative 1 %      Absolute Neutrophils 5.90 Thousands/µL      Absolute Immature Grans 0.06 Thousand/uL      Absolute Lymphocytes 1.08 Thousands/µL      Absolute Monocytes 0.50 Thousand/µL      Eosinophils Absolute 0.59 Thousand/µL      Basophils Absolute 0.07 Thousands/µL                    XR chest 1 view portable   Final Result by Al Jha MD (07/30 1602)      No acute cardiopulmonary disease.             Workstation performed: [id number]               Procedures  POC Cardiac US    Date/Time: 7/30/2024 4:40 PM    Performed by: Dinh Greenfield DO  Authorized by: Dinh Greenfield DO    Patient location:  ED  Other Assisting Provider: No    Procedure details:     Exam Type:  Diagnostic    Indications: suspected volume depletion and dyspnea      Assessment / Evaluation for: cardiac function, pericardial effusion, intravascular volume status and right heart strain (suspected pulmonary embolism)      Exam Type: initial exam      Image quality: diagnostic      Image availability:  Images available in PACS, still images obtained and video obtained  Patient Details:     Cardiac Rhythm:  Regular    Mechanical ventilation: No    Cardiac findings:     Echo technique: limited 2D      Views obtained: parasternal long axis, parasternal short axis, subcostal and apical      Pericardial effusion: absent      Tamponade physiology: absent      Wall motion: normal      LV systolic function: depressed      RV dilation: none    Pulmonary findings:     Left Lung Findings: left lung sliding      Right lung findings: right lung sliding      B-lines: no B-lines present    IVC findings:     IVC Size: dilated      IVC Inspiratory Collapse: absent    Interpretation:     Fluid Status:  Hypervolemic        ED Course  ED Course as of 08/01/24 0347   e Jul 30, 2024   1535 Pt was not able to tolerate BIPAP   1622 Creatinine: 1.28  Baseline     1622 hs TnI 0hr: 13   1759 SO: Hx of CHF presented for SOB. Admitted to [facility] for CHF exacerbation.                              SBIRT 22yo+      Flowsheet Row Most Recent Value   Initial Alcohol Screen: US AUDIT-C     1. How often do you have a drink containing alcohol? 1 Filed at: 07/30/2024 4550   2. How many drinks containing alcohol do you have on a typical day you are drinking?  0 Filed at: 07/30/2024 1339   3a. Male UNDER 65: How often do you have five or more drinks on one occasion? 0 Filed at: 07/30/2024 4046    3b. FEMALE Any Age, or MALE 65+: How often do you have 4 or more drinks on one occassion? 0 Filed at: 07/30/2024 1336   Audit-C Score 1 Filed at: 07/30/2024 1336   TASH: How many times in the past year have you...    Used an illegal drug or used a prescription medication for non-medical reasons? Never Filed at: 07/30/2024 1336                  Medical Decision Making  59 year old male with pmhx of A-fib on Eliquis, CHF, CAD, HTN, HLD, HTN and obesity presents to the ED via EMS for evaluation of shortness of breath and bilateral lower extremity edema.     DDX: chf exacerbation, viral illness, asthma exacerbation   Will evaluate with cbc, cmp, and cxr  Will treat with BiPAP - pt was not able to tolerate. Pt was not able to tolerate hi flow either, pt was satting 95% on RA  Will give bumex 10 mg, home dose of 6 mg BID  Labs and imaging discussed with pt who express understanding and agrees with plan for admission for further workup and management      Amount and/or Complexity of Data Reviewed  External Data Reviewed: labs, radiology and ECG.  Labs: ordered. Decision-making details documented in ED Course.  Radiology: ordered.    Risk  Prescription drug management.  Decision regarding hospitalization.          Disposition  Final diagnoses:   Acute on chronic congestive heart failure, unspecified heart failure type (HCC)   Lower leg edema   SOB (shortness of breath)     Time reflects when diagnosis was documented in both MDM as applicable and the Disposition within this note       Time User Action Codes Description Comment    7/30/2024  5:01 PM Dinh Greenfield Add [I50.9] Acute on chronic congestive heart failure, unspecified heart failure type (HCC)     8/1/2024  3:47 AM Dinh Greenfield Add [R60.0] Lower leg edema     8/1/2024  3:47 AM Dinh Greenfield Add [R06.02] SOB (shortness of breath)           ED Disposition       ED Disposition   Admit    Condition   Stable    Date/Time   Tue Jul 30, 2024 9624    Comment   Case was discussed with  [facility] and the patient's admission status was agreed to be Admission Status: inpatient status to the service of [facility] .               Follow-up Information    None         Current Discharge Medication List        CONTINUE these medications which have NOT CHANGED    Details   Accu-Chek FastClix Lancets MISC Test blood sugar twice daily  Qty: 200 each, Refills: 3    Associated Diagnoses: Type 2 diabetes mellitus with hyperglycemia, without long-term current use of insulin (Formerly [facility])      acetaminophen (TYLENOL) 325 mg tablet Take 2 tablets (650 mg total) by mouth every 6 (six) hours as needed for mild pain, headaches or fever    Associated Diagnoses: Acute gallstone pancreatitis; Choledocholithiasis      albuterol (PROVENTIL HFA,VENTOLIN HFA) 90 mcg/act inhaler Inhale 2 puffs every 4 (four) hours as needed for wheezing or shortness of breath  Qty: 18 g, Refills: 5    Comments: Substitution to a formulary equivalent within the same pharmaceutical class is authorized.  Associated Diagnoses: Mild intermittent asthma without complication      atorvastatin (LIPITOR) 10 mg tablet TAKE 1 TABLET BY MOUTH EVERY DAY  Qty: 90 tablet, Refills: 3    Associated Diagnoses: Mixed hyperlipidemia      Blood Glucose Monitoring Suppl (Accu-Chek Guide) w/Device KIT Use 2 (two) times a day Test blood sugar twice daily  Qty: 1 kit, Refills: 0    Associated Diagnoses: Type 2 diabetes mellitus with hyperglycemia, without long-term current use of insulin ([facility])      budesonide-formoterol (Symbicort) 160-4.5 mcg/act inhaler Inhale 2 puffs 2 (two) times a day Rinse mouth after use.  Qty: 30.6 g, Refills: 2    Associated Diagnoses: Moderate persistent asthma with acute exacerbation      bumetanide (BUMEX) 2 mg tablet Take 3 tablets (6 mg total) by mouth 2 (two) times a day  Qty: 540 tablet, Refills: 1    Associated Diagnoses: Chronic heart failure with preserved ejection fraction (HCC)      Eliquis 5 MG TAKE 1 TABLET BY MOUTH TWICE A DAY  Qty: 60 tablet,  Refills: 5    Associated Diagnoses: A-fib ([facility]); Atrial fibrillation, unspecified type ([facility])      Empagliflozin (JARDIANCE) 10 MG TABS tablet Take 1 tablet (10 mg total) by mouth daily  Qty: 30 tablet, Refills: 11    Associated Diagnoses: Acute on chronic right-sided heart failure ([facility]); Type 2 diabetes mellitus with stage 3b chronic kidney disease, without long-term current use of insulin ([facility])      famotidine (PEPCID) 20 mg tablet Take 1 tablet (20 mg total) by mouth 2 (two) times a day  Qty: 30 tablet, Refills: 0    Associated Diagnoses: Chest pain      fluticasone (FLONASE) 50 mcg/act nasal spray 1 spray into each nostril 2 (two) times a day  Refills: 0    Associated Diagnoses: Nasal congestion      Klor-Con M20 20 MEQ tablet TAKE 2 TABLETS BY MOUTH 2 TIMES A DAY.  Qty: 360 tablet, Refills: 2    Associated Diagnoses: Diuretic-induced hypokalemia      loratadine (CLARITIN) 10 mg tablet Take 1 tablet (10 mg total) by mouth daily Do not start before October 13, 2023.  Refills: 0    Associated Diagnoses: Nasal congestion      metolazone (ZAROXOLYN) 2.5 mg tablet Take 2 tablets (5 mg total) by mouth if needed (weight gain of 3+ lbs in 24 hours, 5+ lbs in one week or signs of worsening fluid retention) Take 20-30 minutes before Bumex dose and with additional potassium    Associated Diagnoses: Chronic heart failure with preserved ejection fraction ([facility])      metoprolol succinate (TOPROL-XL) 50 mg 24 hr tablet Take 1 tablet (50 mg total) by mouth daily  Qty: 30 tablet, Refills: 5    Associated Diagnoses: Chronic heart failure with preserved ejection fraction ([facility])      montelukast (SINGULAIR) 10 mg tablet Take 1 tablet (10 mg total) by mouth daily at bedtime  Qty: 90 tablet, Refills: 1    Associated Diagnoses: Moderate persistent asthma with acute exacerbation      pregabalin (LYRICA) 50 mg capsule Take 1 caps. at bedtime  Qty: 30 capsule, Refills: 5    Associated Diagnoses: Diabetic polyneuropathy associated with type  2 diabetes mellitus ([facility])      sitaGLIPtin (Januvia) 100 mg tablet TAKE 1/2 TABLET BY MOUTH EVERY DAY  Qty: 15 tablet, Refills: 5    Associated Diagnoses: Type 2 diabetes mellitus with stage 3b chronic kidney disease, without long-term current use of insulin ([facility])      spironolactone (ALDACTONE) 25 mg tablet TAKE 1 TABLET (25 MG TOTAL) BY MOUTH DAILY.  Qty: 90 tablet, Refills: 1    Associated Diagnoses: Chronic heart failure with preserved ejection fraction (HFpEF) ([facility])      tiotropium (Spiriva Respimat) 1.25 MCG/ACT AERS inhaler Inhale 2 puffs daily  Qty: 4 g, Refills: 0    Associated Diagnoses: Moderate persistent asthma with acute exacerbation      aluminum-magnesium hydroxide 200-200 MG/5ML suspension Take 5 mL by mouth every 6 (six) hours as needed for heartburn  Qty: 355 mL, Refills: 0    Associated Diagnoses: Chest pain      EPINEPHrine (EPIPEN) 0.3 mg/0.3 mL SOAJ Inject 0.3 mL (0.3 mg total) into a muscle once for 1 dose  Qty: 0.6 mL, Refills: 0    Associated Diagnoses: Anaphylaxis, initial encounter      nitroglycerin (NITROSTAT) 0.4 mg SL tablet Place 1 tablet (0.4 mg total) under the tongue every 5 (five) minutes as needed for chest pain for up to 50 doses  Qty: 50 tablet, Refills: 0    Associated Diagnoses: Chest pain      omeprazole (PriLOSEC) 20 mg delayed release capsule Take 1 capsule (20 mg total) by mouth daily for 14 days  Qty: 14 capsule, Refills: 0    Associated Diagnoses: Chest pain           No discharge procedures on file.    PDMP Review         Value Time User    PDMP Reviewed  Yes 10/6/2023 10:22 PM Yoel Saucedo DO             ED Provider  Attending physically available and evaluated Mesfin SHORE Rachael. I managed the patient along with the ED Attending.    Electronically Signed by           Dinh Greenfield DO  08/01/24 [id number]

## 2024-07-30 NOTE — ED NOTES
Pt put on Bi pap and felt worse. Resp removed and offered Pt O2 via cannula but pt refused.      Lashanda Ding RN  07/30/24 [id number]

## 2024-07-30 NOTE — TELEPHONE ENCOUNTER
Patient called office stating he is experiencing multiple CHF symptoms.    Stated he is having trouble breathing. His legs are edematous from below his knees to his toes. Stated having a difficulty getting his shoe on.   Stated he has belly pain due to the fluid retention.  Decreased urine output, went x 1 during the night. Usually goes 2-3 times. Went once today.    Did not get weight today & does not want to take his shoes off to get it.    While on the phone, took BP.   Left arm BP - 148/53. Pulse  - 69.  Right Arm BP - 116/69. Pulse - 70.    CardioMems Readings:  7/21 - 20.  7/22 - 25.  7/23 - 21.  7/24 - 20.  7/25 - 18.  7/26 - 22.       7/27 - No Reading  7/28 - 20.  7/29 - 21.  7/30 - 18    Has a great deal of mucous, stated when he woke this AM, clear mucous was coming up his throat. Stated he is experiencing a great amount of coughing, expectorating mucous. Denies nasal congestion.     Took Metolazone 2 tablets (5 mg total) last evening, 30 minutes before Bumex 6 mg evening dose. Stated the Metolazone did not help.    Asking if he should take Metolazone 2 tablets (5 mg total) 30 minutes before PM dose of Bumex.    Advised patient to go to ED for evaluation if symptoms persist or worsen.    Please advise.

## 2024-07-30 NOTE — ASSESSMENT & PLAN NOTE
Lab Results   Component Value Date    EGFR 61 07/30/2024    EGFR 52 07/19/2024    EGFR 61 06/27/2024    CREATININE 1.28 07/30/2024    CREATININE 1.47 (H) 07/19/2024    CREATININE 1.28 06/27/2024   Renal function at baseline, continue monitor on IV diuresis

## 2024-07-30 NOTE — ASSESSMENT & PLAN NOTE
Currently no wheezing heard on exam.  Continue home inhalers Symbicort, umeclidinium, Singulair.  Albuterol as needed

## 2024-07-30 NOTE — ASSESSMENT & PLAN NOTE
"Lab Results   Component Value Date    HGBA1C 8.3 (H) 06/19/2024       No results for input(s): \"POCGLU\" in the last 72 hours.    Blood Sugar Average: Last 72 hrs:  Continue Lyrica  Hold home Januvia, start sliding scale    "

## 2024-07-30 NOTE — H&P
"[facility]  H&P  Name: Mesfin Cano 59 y.o. male I MRN: [id number]  Unit/Bed#: ED 06 I Date of Admission: 7/30/2024   Date of Service: 7/30/2024 I Hospital Day: 0      Assessment & Plan   * Acute on chronic diastolic congestive heart failure (HCC)  Assessment & Plan  Wt Readings from Last 3 Encounters:   07/19/24 (!) 150 kg (330 lb)   06/26/24 (!) 149 kg (328 lb 3.2 oz)   05/24/24 (!) 150 kg (331 lb 9.6 oz)     59-year-old male with PMH of diastolic CHF, A-fib, presented to ED with complaint of worsening dyspnea, lower extremity edema, and orthopnea.  Patient denies excessive fluid intake or salt intake.  Patient has been compliant with his home Bumex 6 mg twice daily.  In ED, initially patient was requiring HFNC briefly, subsequently taken off currently on room air.  Give 1 dose of IV Bumex 10 mg x 1.  Assess for response.  Renal function at baseline, continue home Jardiance.  Cardiology consult appreciated, likely will need Bumex infusion.  Reviewed recent echocardiogram  Fluid restrictions, intake and output          Moderate persistent asthma without complication  Assessment & Plan  Currently no wheezing heard on exam.  Continue home inhalers Symbicort, umeclidinium, Singulair.  Albuterol as needed    Diabetic polyneuropathy associated with type 2 diabetes mellitus (HCC)  Assessment & Plan  Lab Results   Component Value Date    HGBA1C 8.3 (H) 06/19/2024       No results for input(s): \"POCGLU\" in the last 72 hours.    Blood Sugar Average: Last 72 hrs:  Continue Lyrica  Hold home Januvia, start sliding scale      CKD (chronic kidney disease) stage 3 ([facility])  Assessment & Plan  Lab Results   Component Value Date    EGFR 61 07/30/2024    EGFR 52 07/19/2024    EGFR 61 06/27/2024    CREATININE 1.28 07/30/2024    CREATININE 1.47 (H) 07/19/2024    CREATININE 1.28 06/27/2024   Renal function at baseline, continue monitor on IV diuresis    Paroxysmal atrial fibrillation " ([facility])  Assessment & Plan  Anticoagulated with Eliquis.  Rate controlled with metoprolol    VICKY (obstructive sleep apnea)  Assessment & Plan  Currently not using CPAP at bedtime    Morbid obesity (HCC)  Assessment & Plan  Affects all aspects of health           VTE Prophylaxis: Apixaban (Eliquis)  / sequential compression device   Code Status: Full code    Anticipated Length of Stay:  Patient will be admitted on an Inpatient basis with an anticipated length of stay of  > 2 midnights.   Justification for Hospital Stay: Volume overload    Total Time for Visit, including Counseling / Coordination of Care: 60 minutes.  Greater than 50% of this total time spent on direct patient counseling and coordination of care.    Chief Complaint:   Shortness of breath    History of Present Illness:    Mesfin Cano is a 59 y.o. male with PMH of chronic diastolic heart failure, A-fib/flutter on Eliquis, morbid obesity, DM2, neuropathy, presented to ED with complaint of worsening dyspnea, bilateral lower extremity edema, and orthopnea started 1 day ago.  In ED, patient has increased work of breathing, requiring high flow nasal cannula briefly.  Labs reveals elevated proBNP.  Chest x-ray negative.  On exam, 3+ bilateral lower extremity edema noted.  Lungs are clear to auscultation.  Currently weaned off to room air.  Currently he denies dyspnea, chest pain, nausea or vomiting.    Review of Systems:    Review of Systems   Constitutional:  Positive for unexpected weight change. Negative for activity change and appetite change.   HENT:  Negative for congestion and sore throat.    Eyes:  Negative for pain and visual disturbance.   Respiratory:  Positive for shortness of breath. Negative for cough.    Cardiovascular:  Positive for leg swelling. Negative for chest pain.   Gastrointestinal:  Negative for abdominal distention and abdominal pain.   Endocrine: Negative for polyuria.   Genitourinary:  Negative for difficulty urinating.    Musculoskeletal:  Negative for arthralgias and joint swelling.   Skin:  Negative for wound.   Allergic/Immunologic: Negative for food allergies.   Neurological:  Negative for light-headedness and headaches.   Hematological:  Negative for adenopathy.   Psychiatric/Behavioral:  Negative for sleep disturbance.        Past Medical and Surgical History:     Past Medical History:   Diagnosis Date    Acute gastritis without hemorrhage     last assessed 3/24/17    Asthma     Atrial fibrillation (HCC)     Bilateral leg edema 02/19/2020    CHF (congestive heart failure) (HCC)     Coronary artery disease     Daytime sleepiness 07/17/2019    Diabetes mellitus (HCC)     Dyspnea on exertion 10/11/2021    Edema of both feet 01/23/2020    Gastric bypass status for obesity     Hyperlipidemia     Hypertension     Hypokalemia 11/14/2021    Impaired fasting glucose     last assessed 5/10/17    Knee sprain, bilateral 06/14/2018    Leg cramps 06/16/2022    Obesity     Viral gastroenteritis     last assessed 11/4/16       Past Surgical History:   Procedure Laterality Date    CARDIAC CATHETERIZATION N/A 3/9/2022    Procedure: CARDIAC RHC W/ PRESSURE SENSOR;  Surgeon: Aminata Wilcox MD;  Location: [facility];  Service: Cardiology    CARDIAC CATHETERIZATION N/A 9/6/2022    Procedure: Cardiac catheterization;  Surgeon: Yolanda Del Rosario DO;  Location: [facility];  Service: Cardiology    CARDIAC CATHETERIZATION N/A 9/6/2022    Procedure: Cardiac Coronary Angiogram;  Surgeon: Yolanda Del Rosario DO;  Location: [facility];  Service: Cardiology    CARDIAC CATHETERIZATION N/A 10/11/2023    Procedure: Cardiac RHC;  Surgeon: Yoel Darden MD;  Location: [facility];  Service: Cardiology    CARPAL TUNNEL RELEASE      bilateral    CHOLECYSTECTOMY LAPAROSCOPIC N/A 2/7/2024    Procedure: CHOLECYSTECTOMY LAPAROSCOPIC;  Surgeon: Nena Ferguson MD;  Location: [facility];  Service: General    GASTRIC BYPASS  2004     with han en y    HERNIA REPAIR      ventral inscisional    IR BIOPSY BONE MARROW  12/14/2023    LAPAROSCOPIC TRANSGASTRIC ACCESS FOR ERCP N/A 2/7/2024    Procedure: LAPAROSCOPIC TRANSGASTRIC ACCESS FOR ERCP;  Surgeon: Nena Ferguson MD;  Location: [facility];  Service: General    LAPAROSCOPIC TRANSGASTRIC ACCESS FOR ERCP N/A 2/7/2024    Procedure: ERCP, sphincterotomy, stone extraction;  Surgeon: Rey Ferguson MD;  Location: [facility];  Service: Gastroenterology    TONSILLECTOMY         Meds/Allergies:    Prior to Admission medications    Medication Sig Start Date End Date Taking? Authorizing Provider   Accu-Chek FastClix Lancets MISC Test blood sugar twice daily 11/16/21   Soo Chavez MD   acetaminophen (TYLENOL) 325 mg tablet Take 2 tablets (650 mg total) by mouth every 6 (six) hours as needed for mild pain, headaches or fever 2/12/24   Paulino Brown MD   albuterol (PROVENTIL HFA,VENTOLIN HFA) 90 mcg/act inhaler Inhale 2 puffs every 4 (four) hours as needed for wheezing or shortness of breath 1/29/24   Soo Chavez MD   aluminum-magnesium hydroxide 200-200 MG/5ML suspension Take 5 mL by mouth every 6 (six) hours as needed for heartburn 6/27/24   Earl Sarkar MD   atorvastatin (LIPITOR) 10 mg tablet TAKE 1 TABLET BY MOUTH EVERY DAY 12/18/23   Aminata Wilcox MD   Blood Glucose Monitoring Suppl (Accu-Chek Guide) w/Device KIT Use 2 (two) times a day Test blood sugar twice daily 8/5/21   Soo Chavez MD   budesonide-formoterol (Symbicort) 160-4.5 mcg/act inhaler Inhale 2 puffs 2 (two) times a day Rinse mouth after use. 1/11/24   Pedro Finn DO   bumetanide (BUMEX) 2 mg tablet Take 3 tablets (6 mg total) by mouth 2 (two) times a day 11/22/23 7/19/24  Aster García PA-C   Eliquis 5 MG TAKE 1 TABLET BY MOUTH TWICE A DAY 7/7/24   Aminata Wilcox MD   Empagliflozin (JARDIANCE) 10 MG TABS tablet Take 1 tablet (10 mg total) by mouth daily 5/26/23   Aminata Farley  MD Brenden   EPINEPHrine (EPIPEN) 0.3 mg/0.3 mL SOAJ Inject 0.3 mL (0.3 mg total) into a muscle once for 1 dose 2/21/23 11/30/23  Eric Jaquez MD   famotidine (PEPCID) 20 mg tablet Take 1 tablet (20 mg total) by mouth 2 (two) times a day 6/27/24   Earl Sarkar MD   fluticasone (FLONASE) 50 mcg/act nasal spray 1 spray into each nostril 2 (two) times a day 10/12/23   RODRIGUE Pickens   Klor-Con M20 20 MEQ tablet TAKE 2 TABLETS BY MOUTH 2 TIMES A DAY. 3/18/24   Soo Chavez MD   loratadine (CLARITIN) 10 mg tablet Take 1 tablet (10 mg total) by mouth daily Do not start before October 13, 2023. 10/13/23   RODRIGUE Pickens   metolazone (ZAROXOLYN) 2.5 mg tablet Take 2 tablets (5 mg total) by mouth if needed (weight gain of 3+ lbs in 24 hours, 5+ lbs in one week or signs of worsening fluid retention) Take 20-30 minutes before Bumex dose and with additional potassium 1/11/24   Aster García PA-C   metoprolol succinate (TOPROL-XL) 50 mg 24 hr tablet Take 1 tablet (50 mg total) by mouth daily 6/9/24   Aminata Wilcox MD   montelukast (SINGULAIR) 10 mg tablet Take 1 tablet (10 mg total) by mouth daily at bedtime 2/3/22 7/2/24  Soo Chavez MD   nitroglycerin (NITROSTAT) 0.4 mg SL tablet Place 1 tablet (0.4 mg total) under the tongue every 5 (five) minutes as needed for chest pain for up to 50 doses 6/27/24   Earl Sarkar MD   omeprazole (PriLOSEC) 20 mg delayed release capsule Take 1 capsule (20 mg total) by mouth daily for 14 days 6/27/24 7/11/24  Earl Sarkar MD   pregabalin (LYRICA) 50 mg capsule Take 1 caps. at bedtime 1/29/24   Soo Chavez MD   sitaGLIPtin (Januvia) 100 mg tablet TAKE 1/2 TABLET BY MOUTH EVERY DAY 5/15/24   Soo Chavez MD   spironolactone (ALDACTONE) 25 mg tablet TAKE 1 TABLET (25 MG TOTAL) BY MOUTH DAILY. 5/10/24   Aminata Wilcox MD   tiotropium (Spiriva Respimat) 1.25 MCG/ACT AERS inhaler Inhale 2 puffs daily 4/3/24 7/2/24  Soo  MD Kathy     I have reviewed home medications using allscripts.    Allergies:   Allergies   Allergen Reactions    Azithromycin Other (See Comments)     Shaky, uneasy feeling     Bactrim [Sulfamethoxazole-Trimethoprim] Other (See Comments)     shakiness       Social History:     Marital Status: /Civil Union    Substance Use History:   Social History     Substance and Sexual Activity   Alcohol Use Yes    Alcohol/week: 2.0 standard drinks of alcohol    Types: 2 Shots of liquor per week    Comment: social     Social History     Tobacco Use   Smoking Status Former    Current packs/day: 1.00    Average packs/day: 1 pack/day for 5.0 years (5.0 ttl pk-yrs)    Types: Pipe, Cigars, Cigarettes    Start date: 2016    Quit date: 1/1/2021    Passive exposure: Never   Smokeless Tobacco Former   Tobacco Comments    cigar once a day     Social History     Substance and Sexual Activity   Drug Use No       Family History:    Family History   Problem Relation Age of Onset    Stroke Mother     Hypertension Father     Cancer Father     COPD Father        Physical Exam:     Vitals:   Blood Pressure: 131/63 (07/30/24 1630)  Pulse: 81 (07/30/24 1630)  Temperature: 98 °F (36.7 °C) (07/30/24 1340)  Respirations: (!) 24 (07/30/24 1630)  SpO2: 98 % (07/30/24 1641)    Physical Exam  Vitals and nursing note reviewed.   Constitutional:       General: He is not in acute distress.     Appearance: He is well-developed. He is obese.   HENT:      Head: Normocephalic and atraumatic.   Eyes:      Conjunctiva/sclera: Conjunctivae normal.   Cardiovascular:      Rate and Rhythm: Normal rate.      Heart sounds: No murmur heard.  Pulmonary:      Effort: Pulmonary effort is normal. No respiratory distress.      Breath sounds: Normal breath sounds.   Abdominal:      Palpations: Abdomen is soft.      Tenderness: There is no abdominal tenderness.   Musculoskeletal:         General: No swelling.      Cervical back: Neck supple.      Right lower leg:  Edema present.      Left lower leg: Edema present.   Skin:     General: Skin is warm and dry.      Capillary Refill: Capillary refill takes less than 2 seconds.   Neurological:      Mental Status: He is alert.   Psychiatric:         Mood and Affect: Mood normal.          Additional Data:     Lab Results: I have personally reviewed pertinent reports.      Results from last 7 days   Lab Units 07/30/24  1355   WBC Thousand/uL 8.20   HEMOGLOBIN g/dL 10.1*   HEMATOCRIT % 33.3*   PLATELETS Thousands/uL 257   SEGS PCT % 72   LYMPHO PCT % 13*   MONO PCT % 6   EOS PCT % 7*     Results from last 7 days   Lab Units 07/30/24  1355   SODIUM mmol/L 135   POTASSIUM mmol/L 4.1   CHLORIDE mmol/L 104   CO2 mmol/L 20*   BUN mg/dL 24   CREATININE mg/dL 1.28   ANION GAP mmol/L 11   CALCIUM mg/dL 8.1*   ALBUMIN g/dL 3.6   TOTAL BILIRUBIN mg/dL 0.40   ALK PHOS U/L 95   ALT U/L 6*   AST U/L 9*   GLUCOSE RANDOM mg/dL 219*                       Imaging: I have personally reviewed pertinent reports.      XR chest 1 view portable   Final Result by Al Jha MD (07/30 1602)      No acute cardiopulmonary disease.            Workstation performed: [id number]                ** Please Note: This note has been constructed using a voice recognition system. **

## 2024-07-30 NOTE — ASSESSMENT & PLAN NOTE
Wt Readings from Last 3 Encounters:   07/19/24 (!) 150 kg (330 lb)   06/26/24 (!) 149 kg (328 lb 3.2 oz)   05/24/24 (!) 150 kg (331 lb 9.6 oz)     59-year-old male with PMH of diastolic CHF, A-fib, presented to ED with complaint of worsening dyspnea, lower extremity edema, and orthopnea.  Patient denies excessive fluid intake or salt intake.  Patient has been compliant with his home Bumex 6 mg twice daily.  In ED, initially patient was requiring HFNC briefly, subsequently taken off currently on room air.  Give 1 dose of IV Bumex 10 mg x 1.  Assess for response.  Renal function at baseline, continue home Jardiance.  Cardiology consult appreciated, likely will need Bumex infusion.  Reviewed recent echocardiogram  Fluid restrictions, intake and output

## 2024-07-30 NOTE — ED ATTENDING ATTESTATION
7/30/2024  I, Sunitha Nathan MD, saw and evaluated the patient. I have discussed the patient with the resident/non-physician practitioner and agree with the resident's/non-physician practitioner's findings, Plan of Care, and MDM as documented in the resident's/non-physician practitioner's note, except where noted. All available labs and Radiology studies were reviewed.  I was present for key portions of any procedure(s) performed by the resident/non-physician practitioner and I was immediately available to provide assistance.       At this point I agree with the current assessment done in the Emergency Department.  I have conducted an independent evaluation of this patient a history and physical is as follows:  This is a 59-year-old male with history of atrial fibrillation on Eliquis, congestive heart failure, coronary disease, hypertension and hyperlipidemia as well as diabetes.  Patient has been short of breath since yesterday.  The patient states that he has been admitted numerous times in the past with the same symptoms.  States that he been feeling well, but then got short of breath, and has been progressively short of breath.  He feels congestion in his chest.  He was unable to lie flat and had to sleep on extra pillows to get comfortable last night.  The patient states that he has had increasing lower extremity edema.  He has not had fevers or chills.  He states that he was first diagnosed with congestive heart failure after getting his COVID-vaccine a few years ago.  He is followed by cardiology.  The patient states that he has mild baseline renal dysfunction related to being on chronic diuresis.  He denies chest pain.  He is on Eliquis, and states that he has been compliant.  He has no history of thromboembolic disease.  Patient states he has history of asthma, has last been on steroids within the last 1 year, never intubated.  States that he did not have any asthma exacerbations for about 30 years,  but then when he was diagnosed with CHF, he routinely gets treated for asthma as well.  No lateralizing swelling.  No skin changes.  Review of systems otherwise negative in 12 systems reviewed.  On exam patient awake alert.  He has increased work of breathing, tachypnea to the mid 30s, his oxygen saturation is 92%.  On HEENT exam, his conjunctiva are pink.  His mucous membranes are moist.  His neck is supple and nontender.  The patient's heart is regular.  I do not appreciate a murmurs, rubs, or gallops.  His lungs are decreased bilaterally, but they are symmetric.  He does not have any dullness to percussion.  His abdomen is soft and diffusely tender with no rebound, guarding, or masses.  He has +2 bilateral symmetric pitting edema with no skin changes bilaterally.  He is neurologically nonfocal.MEDICAL DECISION MAKING    Number and Complexity of Problems  Differential diagnosis: Respiratory distress secondary to CHF, doubt asthma exacerbation, doubt pulmonary embolus, doubt MI, possibly viral contribution    Medical Decision Making Data  External documents reviewed: Patient's prior echo reviewed, patient with EF of 55% in the past, mild hypertrophy  My EKG interpretation: Sinus, normal QT, no evidence of ischemia or ectopy  My CT interpretation:   My X-ray interpretation: Single view, no cardiomegaly, no evidence of effusions  My ultrasound interpretation:     XR chest 1 view portable   Final Result      No acute cardiopulmonary disease.            Workstation performed: [id number]             Labs Reviewed   CBC AND DIFFERENTIAL - Abnormal       Result Value Ref Range Status    WBC 8.20  4.31 - 10.16 Thousand/uL Final    RBC 4.04  3.88 - 5.62 Million/uL Final    Hemoglobin 10.1 (*) 12.0 - 17.0 g/dL Final    Hematocrit 33.3 (*) 36.5 - 49.3 % Final    MCV 82  82 - 98 fL Final    MCH 25.0 (*) 26.8 - 34.3 pg Final    MCHC 30.3 (*) 31.4 - 37.4 g/dL Final    RDW 20.5 (*) 11.6 - 15.1 % Final    MPV 10.6  8.9 - 12.7 fL  Final    Platelets 257  149 - 390 Thousands/uL Final    nRBC 0  /100 WBCs Final    Segmented % 72  43 - 75 % Final    Immature Grans % 1  0 - 2 % Final    Lymphocytes % 13 (*) 14 - 44 % Final    Monocytes % 6  4 - 12 % Final    Eosinophils Relative 7 (*) 0 - 6 % Final    Basophils Relative 1  0 - 1 % Final    Absolute Neutrophils 5.90  1.85 - 7.62 Thousands/µL Final    Absolute Immature Grans 0.06  0.00 - 0.20 Thousand/uL Final    Absolute Lymphocytes 1.08  0.60 - 4.47 Thousands/µL Final    Absolute Monocytes 0.50  0.17 - 1.22 Thousand/µL Final    Eosinophils Absolute 0.59  0.00 - 0.61 Thousand/µL Final    Basophils Absolute 0.07  0.00 - 0.10 Thousands/µL Final   COMPREHENSIVE METABOLIC PANEL - Abnormal    Sodium 135  135 - 147 mmol/L Final    Potassium 4.1  3.5 - 5.3 mmol/L Final    Chloride 104  96 - 108 mmol/L Final    CO2 20 (*) 21 - 32 mmol/L Final    ANION GAP 11  4 - 13 mmol/L Final    BUN 24  5 - 25 mg/dL Final    Creatinine 1.28  0.60 - 1.30 mg/dL Final    Comment: Standardized to IDMS reference method    Glucose 219 (*) 65 - 140 mg/dL Final    Comment: If the patient is fasting, the ADA then defines impaired fasting glucose as > 100 mg/dL and diabetes as > or equal to 123 mg/dL.    Calcium 8.1 (*) 8.4 - 10.2 mg/dL Final    AST 9 (*) 13 - 39 U/L Final    ALT 6 (*) 7 - 52 U/L Final    Comment: Specimen collection should occur prior to Sulfasalazine administration due to the potential for falsely depressed results.     Alkaline Phosphatase 95  34 - 104 U/L Final    Total Protein 6.3 (*) 6.4 - 8.4 g/dL Final    Albumin 3.6  3.5 - 5.0 g/dL Final    Total Bilirubin 0.40  0.20 - 1.00 mg/dL Final    Comment: Use of this assay is not recommended for patients undergoing treatment with eltrombopag due to the potential for falsely elevated results.  N-acetyl-p-benzoquinone imine (metabolite of Acetaminophen) will generate erroneously low results in samples for patients that have taken an overdose of Acetaminophen.     eGFR 61  ml/min/1.73sq m Final    Narrative:     National Kidney Disease Foundation guidelines for Chronic Kidney Disease (CKD):     Stage 1 with normal or high GFR (GFR > 90 mL/min/1.73 square meters)    Stage 2 Mild CKD (GFR = 60-89 mL/min/1.73 square meters)    Stage 3A Moderate CKD (GFR = 45-59 mL/min/1.73 square meters)    Stage 3B Moderate CKD (GFR = 30-44 mL/min/1.73 square meters)    Stage 4 Severe CKD (GFR = 15-29 mL/min/1.73 square meters)    Stage 5 End Stage CKD (GFR <15 mL/min/1.73 square meters)  Note: GFR calculation is accurate only with a steady state creatinine   B-TYPE NATRIURETIC PEPTIDE (BNP) - Abnormal     (*) 0 - 100 pg/mL Final   COVID19, INFLUENZA A/B, RSV PCR, [facility] - Normal    SARS-CoV-2 Negative  Negative Final    Comment:      INFLUENZA A PCR Negative  Negative Final    Comment:      INFLUENZA B PCR Negative  Negative Final    Comment:      RSV PCR Negative  Negative Final    Comment:      Narrative:     FOR PEDIATRIC PATIENTS - copy/paste COVID Guidelines URL to browser: [email or web address]    SARS-CoV-2 assay is a Nucleic Acid Amplification assay intended for the  qualitative detection of nucleic acid from SARS-CoV-2 in nasopharyngeal  swabs. Results are for the presumptive identification of SARS-CoV-2 RNA.    Positive results are indicative of infection with SARS-CoV-2, the virus  causing COVID-19, but do not rule out bacterial infection or co-infection  with other viruses. Laboratories within the [place] and its  territories are required to report all positive results to the appropriate  public health authorities. Negative results do not preclude SARS-CoV-2  infection and should not be used as the sole basis for treatment or other  patient management decisions. Negative results must be combined with  clinical observations, patient history, and epidemiological information.  This test has not been FDA cleared or  "approved.    This test has been authorized by FDA under an Emergency Use Authorization  (EUA). This test is only authorized for the duration of time the  declaration that circumstances exist justifying the authorization of the  emergency use of an in vitro diagnostic tests for detection of SARS-CoV-2  virus and/or diagnosis of COVID-19 infection under section 564(b)(1) of  the Act, 21 U.S.C. 360bbb-3(b)(1), unless the authorization is terminated  or revoked sooner. The test has been validated but independent review by FDA  and CLIA is pending.    Test performed using [employer] ( [employer] GeneXpert: This RT-PCR assay targets N2,  a region unique to SARS-CoV-2. A conserved region in the E-gene was chosen  for pan-Sarbecovirus detection which includes SARS-CoV-2.    According to CMS-2020-01-R, this platform meets the definition of high-throughput technology.   HS TROPONIN I 0HR - Normal    hs TnI 0hr 13  \"Refer to ACS Flowchart\"- see link ng/L Final    Comment:                                              Initial (time 0) result  If >=50 ng/L, Myocardial injury suggested ;  Type of myocardial injury and treatment strategy  to be determined.  If 5-49 ng/L, a delta result at 2 hours and or 4 hours will be needed to further evaluate.  If <4 ng/L, and chest pain has been >3 hours since onset, patient may qualify for discharge based on the HEART score in the ED.  If <5 ng/L and <3hours since onset of chest pain, a delta result at 2 hours will be needed to further evaluate.    HS Troponin 99th Percentile URL of a Health Population=12 ng/L with a 95% Confidence Interval of 8-18 ng/L.    Second Troponin (time 2 hours)  If calculated delta >= 20 ng/L,  Myocardial injury suggested ; Type of myocardial injury and treatment strategy to be determined.  If 5-49 ng/L and the calculated delta is 5-19 ng/L, consult medical service for evaluation.  Continue evaluation for ischemia on ecg and other possible etiology and repeat hs troponin at 4 " hours.  If delta is <5 ng/L at 2 hours, consider discharge based on risk stratification via the HEART score (if in ED), or ISATU risk score in IP/Observation.    HS Troponin 99th Percentile URL of a Health Population=12 ng/L with a 95% Confidence Interval of 8-18 ng/L.   HS TROPONIN I 2HR   HS TROPONIN I 4HR       Labs reviewed by me are significant for: Slightly elevated BNP    Clinical decision rules/scores are significant for:     Discussed case with:   Considered admission for: Congestive heart failure    Treatment and Disposition  ED course: Patient seen and examined, patient with elevated work of breathing.  Placed on BiPAP, Bumex drip, will admit to the hospital for congestive heart failure  Shared decision making:   Code status:     ED Course         Critical Care Time  CriticalCare Time    Date/Time: 7/30/2024 3:01 PM    Performed by: Sunitha Nathan MD  Authorized by: Sunitha Nathan MD    Critical care provider statement:     Critical care time (minutes):  30    Critical care time was exclusive of:  Separately billable procedures and treating other patients and teaching time    Critical care was necessary to treat or prevent imminent or life-threatening deterioration of the following conditions:  Respiratory failure and cardiac failure    Critical care was time spent personally by me on the following activities:  Blood draw for specimens, obtaining history from patient or surrogate, development of treatment plan with patient or surrogate, discussions with consultants, discussions with primary provider, evaluation of patient's response to treatment, examination of patient, review of old charts, re-evaluation of patient's condition, ordering and review of radiographic studies, ordering and review of laboratory studies and ordering and performing treatments and interventions

## 2024-07-30 NOTE — RESPIRATORY THERAPY NOTE
07/30/24 1641   Respiratory Assessment   Resp Comments Pt initiated on HFNC at this time per MD   O2 Device HFNC   Non-Invasive Information   O2 Interface Device HFNC prongs   Non-Invasive Ventilation Mode HFNC (High flow)   SpO2 98 %   $ Pulse Oximetry Spot Check Charge Completed   Non-Invasive Settings   FiO2 (%) 35   Flow (lpm) 45   Temperature (Set) 31

## 2024-07-31 ENCOUNTER — CLINICAL SUPPORT (OUTPATIENT)
Dept: CARDIOLOGY CLINIC | Facility: CLINIC | Age: 59
End: 2024-07-31
Payer: COMMERCIAL

## 2024-07-31 DIAGNOSIS — I50.32 CHRONIC HEART FAILURE WITH PRESERVED EJECTION FRACTION (HCC): Primary | ICD-10-CM

## 2024-07-31 PROBLEM — D64.9 ANEMIA: Status: ACTIVE | Noted: 2024-07-31

## 2024-07-31 LAB
ANION GAP SERPL CALCULATED.3IONS-SCNC: 10 MMOL/L (ref 4–13)
ANION GAP SERPL CALCULATED.3IONS-SCNC: 10 MMOL/L (ref 4–13)
BUN SERPL-MCNC: 27 MG/DL (ref 5–25)
BUN SERPL-MCNC: 29 MG/DL (ref 5–25)
CALCIUM SERPL-MCNC: 8.2 MG/DL (ref 8.4–10.2)
CALCIUM SERPL-MCNC: 8.7 MG/DL (ref 8.4–10.2)
CHLORIDE SERPL-SCNC: 101 MMOL/L (ref 96–108)
CHLORIDE SERPL-SCNC: 97 MMOL/L (ref 96–108)
CO2 SERPL-SCNC: 26 MMOL/L (ref 21–32)
CO2 SERPL-SCNC: 27 MMOL/L (ref 21–32)
CREAT SERPL-MCNC: 1.4 MG/DL (ref 0.6–1.3)
CREAT SERPL-MCNC: 1.67 MG/DL (ref 0.6–1.3)
ERYTHROCYTE [DISTWIDTH] IN BLOOD BY AUTOMATED COUNT: 20.7 % (ref 11.6–15.1)
GFR SERPL CREATININE-BSD FRML MDRD: 44 ML/MIN/1.73SQ M
GFR SERPL CREATININE-BSD FRML MDRD: 55 ML/MIN/1.73SQ M
GLUCOSE SERPL-MCNC: 125 MG/DL (ref 65–140)
GLUCOSE SERPL-MCNC: 155 MG/DL (ref 65–140)
GLUCOSE SERPL-MCNC: 162 MG/DL (ref 65–140)
GLUCOSE SERPL-MCNC: 165 MG/DL (ref 65–140)
GLUCOSE SERPL-MCNC: 261 MG/DL (ref 65–140)
HCT VFR BLD AUTO: 29.4 % (ref 36.5–49.3)
HGB BLD-MCNC: 8.9 G/DL (ref 12–17)
MAGNESIUM SERPL-MCNC: 1.7 MG/DL (ref 1.9–2.7)
MCH RBC QN AUTO: 25.1 PG (ref 26.8–34.3)
MCHC RBC AUTO-ENTMCNC: 30.3 G/DL (ref 31.4–37.4)
MCV RBC AUTO: 83 FL (ref 82–98)
PHOSPHATE SERPL-MCNC: 3.8 MG/DL (ref 2.7–4.5)
PLATELET # BLD AUTO: 228 THOUSANDS/UL (ref 149–390)
PMV BLD AUTO: 10.4 FL (ref 8.9–12.7)
POTASSIUM SERPL-SCNC: 4.2 MMOL/L (ref 3.5–5.3)
POTASSIUM SERPL-SCNC: 4.6 MMOL/L (ref 3.5–5.3)
RBC # BLD AUTO: 3.55 MILLION/UL (ref 3.88–5.62)
SODIUM SERPL-SCNC: 133 MMOL/L (ref 135–147)
SODIUM SERPL-SCNC: 138 MMOL/L (ref 135–147)
WBC # BLD AUTO: 6.86 THOUSAND/UL (ref 4.31–10.16)

## 2024-07-31 PROCEDURE — 99232 SBSQ HOSP IP/OBS MODERATE 35: CPT | Performed by: INTERNAL MEDICINE

## 2024-07-31 PROCEDURE — 99232 SBSQ HOSP IP/OBS MODERATE 35: CPT | Performed by: NURSE PRACTITIONER

## 2024-07-31 PROCEDURE — 93264 REM MNTR WRLS P-ART PRS SNR: CPT | Performed by: INTERNAL MEDICINE

## 2024-07-31 PROCEDURE — 80048 BASIC METABOLIC PNL TOTAL CA: CPT | Performed by: FAMILY MEDICINE

## 2024-07-31 PROCEDURE — 36415 COLL VENOUS BLD VENIPUNCTURE: CPT | Performed by: FAMILY MEDICINE

## 2024-07-31 PROCEDURE — 84100 ASSAY OF PHOSPHORUS: CPT | Performed by: FAMILY MEDICINE

## 2024-07-31 PROCEDURE — 82948 REAGENT STRIP/BLOOD GLUCOSE: CPT

## 2024-07-31 PROCEDURE — 85027 COMPLETE CBC AUTOMATED: CPT | Performed by: FAMILY MEDICINE

## 2024-07-31 PROCEDURE — 80048 BASIC METABOLIC PNL TOTAL CA: CPT | Performed by: NURSE PRACTITIONER

## 2024-07-31 PROCEDURE — 83735 ASSAY OF MAGNESIUM: CPT | Performed by: FAMILY MEDICINE

## 2024-07-31 RX ORDER — INSULIN LISPRO 100 [IU]/ML
2-12 INJECTION, SOLUTION INTRAVENOUS; SUBCUTANEOUS
Status: DISCONTINUED | OUTPATIENT
Start: 2024-07-31 | End: 2024-08-04 | Stop reason: HOSPADM

## 2024-07-31 RX ORDER — BUMETANIDE 0.25 MG/ML
5 INJECTION INTRAMUSCULAR; INTRAVENOUS ONCE
Status: COMPLETED | OUTPATIENT
Start: 2024-07-31 | End: 2024-07-31

## 2024-07-31 RX ORDER — MAGNESIUM SULFATE HEPTAHYDRATE 40 MG/ML
2 INJECTION, SOLUTION INTRAVENOUS ONCE
Status: COMPLETED | OUTPATIENT
Start: 2024-07-31 | End: 2024-08-01

## 2024-07-31 RX ORDER — POTASSIUM CHLORIDE 20 MEQ/1
20 TABLET, EXTENDED RELEASE ORAL ONCE
Status: COMPLETED | OUTPATIENT
Start: 2024-07-31 | End: 2024-07-31

## 2024-07-31 RX ORDER — BUMETANIDE 0.25 MG/ML
1 INJECTION INTRAMUSCULAR; INTRAVENOUS CONTINUOUS
Status: DISCONTINUED | OUTPATIENT
Start: 2024-07-31 | End: 2024-08-03

## 2024-07-31 RX ADMIN — MAGNESIUM SULFATE HEPTAHYDRATE 2 G: 40 INJECTION, SOLUTION INTRAVENOUS at 10:26

## 2024-07-31 RX ADMIN — POTASSIUM CHLORIDE 20 MEQ: 1500 TABLET, EXTENDED RELEASE ORAL at 13:42

## 2024-07-31 RX ADMIN — Medication 1 MG/HR: at 17:52

## 2024-07-31 RX ADMIN — PREGABALIN 50 MG: 50 CAPSULE ORAL at 21:24

## 2024-07-31 RX ADMIN — BUDESONIDE AND FORMOTEROL FUMARATE DIHYDRATE 2 PUFF: 160; 4.5 AEROSOL RESPIRATORY (INHALATION) at 09:29

## 2024-07-31 RX ADMIN — BUDESONIDE AND FORMOTEROL FUMARATE DIHYDRATE 2 PUFF: 160; 4.5 AEROSOL RESPIRATORY (INHALATION) at 17:50

## 2024-07-31 RX ADMIN — INSULIN LISPRO 2 UNITS: 100 INJECTION, SOLUTION INTRAVENOUS; SUBCUTANEOUS at 17:07

## 2024-07-31 RX ADMIN — BUMETANIDE 5 MG: 0.25 INJECTION INTRAMUSCULAR; INTRAVENOUS at 13:36

## 2024-07-31 RX ADMIN — MONTELUKAST 10 MG: 10 TABLET, FILM COATED ORAL at 21:24

## 2024-07-31 RX ADMIN — APIXABAN 5 MG: 5 TABLET, FILM COATED ORAL at 17:50

## 2024-07-31 RX ADMIN — Medication 1 MG/HR: at 13:36

## 2024-07-31 RX ADMIN — EMPAGLIFLOZIN 10 MG: 10 TABLET, FILM COATED ORAL at 09:30

## 2024-07-31 RX ADMIN — INSULIN LISPRO 6 UNITS: 100 INJECTION, SOLUTION INTRAVENOUS; SUBCUTANEOUS at 12:09

## 2024-07-31 RX ADMIN — INSULIN LISPRO 2 UNITS: 100 INJECTION, SOLUTION INTRAVENOUS; SUBCUTANEOUS at 21:25

## 2024-07-31 RX ADMIN — ATORVASTATIN CALCIUM 10 MG: 10 TABLET, FILM COATED ORAL at 09:30

## 2024-07-31 RX ADMIN — APIXABAN 5 MG: 5 TABLET, FILM COATED ORAL at 09:30

## 2024-07-31 RX ADMIN — UMECLIDINIUM 1 PUFF: 62.5 AEROSOL, POWDER ORAL at 09:29

## 2024-07-31 RX ADMIN — SPIRONOLACTONE 25 MG: 25 TABLET ORAL at 09:30

## 2024-07-31 RX ADMIN — METOPROLOL SUCCINATE 50 MG: 50 TABLET, EXTENDED RELEASE ORAL at 09:30

## 2024-07-31 NOTE — PROGRESS NOTES
[facility]  Progress Note  Name: Mesfin Cano I  MRN: [id number]  Unit/Bed#: ED 06 I Date of Admission: 7/30/2024   Date of Service: 7/31/2024 I Hospital Day: 1    Assessment & Plan   * Acute on chronic diastolic congestive heart failure (HCC)  Assessment & Plan  59-year-old male with PMH of diastolic CHF, A-fib, presented to ED with complaint of worsening dyspnea, lower extremity edema, and orthopnea.  Patient denies excessive fluid intake or salt intake.  Patient has been compliant with his home Bumex 6 mg twice daily.  In ED, initially patient was requiring HFNC briefly, subsequently taken off currently on room air.  CXR WNL  S/P bumex 10 mg IV x 1 with about 1 L out per patient.  Continue spironolactone as per PTA. Will defer further diuretic management to heart failure team, consult pending.   GDMT: Continue BB, Jardiance.   Recent echo 6/2024 showed EF 55%.  LA/RA dilated.   I/O, daily weights, Low NA diet with FR  Monitor volume status           CKD (chronic kidney disease) stage 3 (HCC)  Assessment & Plan  Lab Results   Component Value Date    EGFR 55 07/31/2024    EGFR 61 07/30/2024    EGFR 52 07/19/2024    CREATININE 1.40 (H) 07/31/2024    CREATININE 1.28 07/30/2024    CREATININE 1.47 (H) 07/19/2024   Baseline around 1.7 per chart review. Currently stable below baseline  Monitor renal function with diuresis  Avoid nephrotoxins/hypotension  BMP in AM      Diabetic polyneuropathy associated with type 2 diabetes mellitus (HCC)  Assessment & Plan  Lab Results   Component Value Date    HGBA1C 8.3 (H) 06/19/2024     Continue Lyrica for chronic neuropathy   Hold home Januvia  SSI  Monitor accuchecks and adjust regimen PRN      Anemia  Assessment & Plan  Baseline appears 7-8. Due to eosinophilia in 2023 underwent BMB in December.  Per review, morphology evaluation, flow cytometry, cytogenetics, FISH studies and molecular studies all together did not demonstrate any evidence  of a primary hematologic malignancy. Specifically pathologist thought that the elevated eosinophil count was secondary/reactive to the patient's medical issues per hematologist note  Not on PO iron due to hx of Samuel-en-Y gastric bypass  Monitor CBC       Paroxysmal atrial fibrillation (HCC)  Assessment & Plan  Anticoagulated with Eliquis.  Rate controlled with metoprolol    VICKY (obstructive sleep apnea)  Assessment & Plan  Currently not using CPAP at bedtime    Hyperlipidemia  Assessment & Plan  Continue statin    Morbid obesity (HCC)  Assessment & Plan  Affects all aspects of health, weight loss advised.   Prior hx of Samuel-en-Y gastric bypass noted     Moderate persistent asthma without complication  Assessment & Plan  Without acute exacerbation   Continue home inhalers Symbicort, umeclidinium, Singulair.  Albuterol as needed           VTE Pharmacologic Prophylaxis:   Moderate Risk (Score 3-4) - Pharmacological DVT Prophylaxis Ordered: apixaban (Eliquis).    Mobility:      TBD    Patient Centered Rounds:  will d/w ED RN    Discussions with Specialists or Other Care Team Provider: nursing    Education and Discussions with Family / Patient: Patient declined call to .     Total Time Spent on Date of Encounter in care of patient: 30 mins. This time was spent on one or more of the following: performing physical exam; counseling and coordination of care; obtaining or reviewing history; documenting in the medical record; reviewing/ordering tests, medications or procedures; communicating with other healthcare professionals and discussing with patient's family/caregivers.    Current Length of Stay: 1 day(s)  Current Patient Status: Inpatient   Certification Statement: The patient will continue to require additional inpatient hospital stay due to heart failure evaluation   Discharge Plan: Anticipate discharge in 48-72 hrs to home.    Code Status: Level 1 - Full Code    Subjective:   Patient reports feeling  somewhat improved.  Still unable to lie entirely flat.  Still with persistent lower extremity edema.  He is unclear if he has had any weight gain as he did not weigh himself yesterday but prior to that, weights were stable. Reports compliance with diuretics.     Objective:     Vitals:   Temp (24hrs), Av °F (36.7 °C), Min:98 °F (36.7 °C), Max:98 °F (36.7 °C)    Temp:  [98 °F (36.7 °C)] 98 °F (36.7 °C)  HR:  [81-83] 81  Resp:  [17-24] 17  BP: (109-138)/(58-64) 138/64  SpO2:  [94 %-98 %] 97 %  There is no height or weight on file to calculate BMI.     Input and Output Summary (last 24 hours):     Intake/Output Summary (Last 24 hours) at 2024 0918  Last data filed at 2024 2157  Gross per 24 hour   Intake --   Output 800 ml   Net -800 ml       Physical Exam:   Physical Exam  Vitals and nursing note reviewed.   Constitutional:       General: He is not in acute distress.     Appearance: He is obese.   Cardiovascular:      Rate and Rhythm: Normal rate.   Pulmonary:      Breath sounds: Decreased breath sounds present. No rales.   Abdominal:      Tenderness: There is no abdominal tenderness.   Musculoskeletal:         General: Swelling present.   Skin:     General: Skin is warm.      Coloration: Skin is pale.   Neurological:      Mental Status: He is alert and oriented to person, place, and time. Mental status is at baseline.   Psychiatric:         Mood and Affect: Mood normal.          Additional Data:     Labs:  Results from last 7 days   Lab Units 24  0612 24  1355   WBC Thousand/uL 6.86 8.20   HEMOGLOBIN g/dL 8.9* 10.1*   HEMATOCRIT % 29.4* 33.3*   PLATELETS Thousands/uL 228 257   SEGS PCT %  --  72   LYMPHO PCT %  --  13*   MONO PCT %  --  6   EOS PCT %  --  7*     Results from last 7 days   Lab Units 24  0612 24  1355   SODIUM mmol/L 138 135   POTASSIUM mmol/L 4.2 4.1   CHLORIDE mmol/L 101 104   CO2 mmol/L 27 20*   BUN mg/dL 27* 24   CREATININE mg/dL 1.40* 1.28   ANION GAP mmol/L 10  11   CALCIUM mg/dL 8.2* 8.1*   ALBUMIN g/dL  --  3.6   TOTAL BILIRUBIN mg/dL  --  0.40   ALK PHOS U/L  --  95   ALT U/L  --  6*   AST U/L  --  9*   GLUCOSE RANDOM mg/dL 125 219*                       Lines/Drains:  Invasive Devices       Peripheral Intravenous Line  Duration             Peripheral IV 07/31/24 Right Antecubital <1 day                          Imaging: No pertinent imaging reviewed.    Recent Cultures (last 7 days):         Last 24 Hours Medication List:   Current Facility-Administered Medications   Medication Dose Route Frequency Provider Last Rate    acetaminophen  650 mg Oral Q6H PRN Devang Ingram MD      albuterol  2 puff Inhalation Q4H PRN Devang Ingram MD      apixaban  5 mg Oral BID Devang Ingram MD      atorvastatin  10 mg Oral Daily Devang Ingram MD      budesonide-formoterol  2 puff Inhalation BID Devang Ingram MD      Empagliflozin  10 mg Oral Daily Devang Ingram MD      fluticasone  1 spray Nasal BID Devang Ingram MD      insulin lispro  2-12 Units Subcutaneous TID AC RODRIGUE Ivy      insulin lispro  2-12 Units Subcutaneous HS RODRIGUE Ivy      metoprolol succinate  50 mg Oral Daily Devang Ingram MD      montelukast  10 mg Oral HS Devang Ingram MD      ondansetron  4 mg Intravenous Q6H PRN Devang Ingram MD      pregabalin  50 mg Oral HS Devang Ingram MD      spironolactone  25 mg Oral Daily Devang Ingram MD      umeclidinium  1 puff Inhalation Daily Devang Ingram MD          Today, Patient Was Seen By: RODRIGUE Ivy    **Please Note: This note may have been constructed using a voice recognition system.**

## 2024-07-31 NOTE — ASSESSMENT & PLAN NOTE
Pt presented to ED with complaint of worsening dyspnea, lower extremity edema, and orthopnea.  Patient denies excessive fluid intake or salt intake.  Patient has been compliant with his home Bumex 6 mg twice daily.  In ED, initially patient was requiring HFNC briefly, subsequently taken off currently on room air.  CXR WNL  Heart failure following,  Currently on IV Bumex gtt, continue   Continue spironolactone 25 mg daily   GDMT: Continue BB, Jardiance.   Recent echo 6/2024 showed EF 55%.  LA/RA dilated.   I/O, daily weights, Low NA diet with FR  Monitor volume status

## 2024-07-31 NOTE — ASSESSMENT & PLAN NOTE
Lab Results   Component Value Date    HGBA1C 8.3 (H) 06/19/2024     Continue Lyrica for chronic neuropathy   Hold home Januvia  SSI  Monitor accuchecks and adjust regimen PRN

## 2024-07-31 NOTE — ASSESSMENT & PLAN NOTE
Baseline hgb appears 7-8. Due to eosinophilia in 2023 underwent BMB in December.    Per review, morphology evaluation, flow cytometry, cytogenetics, FISH studies and molecular studies all together did not demonstrate any evidence of a primary hematologic malignancy. Specifically pathologist thought that the elevated eosinophil count was secondary/reactive to the patient's medical issues per hematologist note  Not on PO iron due to hx of Samuel-en-Y gastric bypass  Monitor CBC, hgb currently 10.2 today

## 2024-07-31 NOTE — ASSESSMENT & PLAN NOTE
Lab Results   Component Value Date    EGFR 55 07/31/2024    EGFR 61 07/30/2024    EGFR 52 07/19/2024    CREATININE 1.40 (H) 07/31/2024    CREATININE 1.28 07/30/2024    CREATININE 1.47 (H) 07/19/2024   Baseline around 1.7 per chart review. Currently stable below baseline  Monitor renal function with diuresis  Avoid nephrotoxins/hypotension  BMP in AM

## 2024-07-31 NOTE — ASSESSMENT & PLAN NOTE
Lab Results   Component Value Date    HGBA1C 8.3 (H) 06/19/2024     Continue Lyrica for chronic neuropathy   Hold home Januvia  Continue Jardiance here for HF  Continue SSI coverage while inpatient   Monitor accuchecks and adjust regimen PRN

## 2024-07-31 NOTE — UTILIZATION REVIEW
Initial Clinical Review    Admission: Date/Time/Statement:   Admission Orders (From admission, onward)       Ordered        07/30/24 1702  INPATIENT ADMISSION  Once                          Orders Placed This Encounter   Procedures    INPATIENT ADMISSION     Standing Status:   Standing     Number of Occurrences:   1     Order Specific Question:   Level of Care     Answer:   Med Surg [16]     Order Specific Question:   Estimated length of stay     Answer:   More than 2 Midnights     Order Specific Question:   Certification     Answer:   I certify that inpatient services are medically necessary for this patient for a duration of greater than two midnights. See H&P and MD Progress Notes for additional information about the patient's course of treatment.     ED Arrival Information       Expected   -    Arrival   7/30/2024 13:33    Acuity   Urgent              Means of arrival   Ambulance    Escorted by   [facility] ([place]    Service   Hospitalist    Admission type   Emergency              Arrival complaint   SOB             Chief Complaint   Patient presents with    Shortness of Breath     Since yesterday afternoon. Pt states that it became worse so he called EMS       Initial Presentation: 59 y.o. male  PMH    atrial fibrillation on Eliquis, congestive heart failure,  cardiomyopathy with mid range EF in 2020,  asthma, coronary disease, hypertension and hyperlipidemia as well as diabetes.  VICKY (currently not using CPAP @ HS)  morbid obesity  (s/p gastric bypass)  mild baseline renal dysfunction related to being on chronic diuresis  (baseline crt .     Reports in usual health until yesterday when onset of SOB began - continued to progress.  Orthopnea last noc,  reports increasing b/l LE edema over past day.  first diagnosed with congestive heart failure after getting his COVID-vaccine a few years ago.     In ED,  briefly required face mask , then HFNC  supplemental oxygen.  Pt arrived alert, increased WOB,   tachypnea to mid 30's.   oxygen saturation is 92%. lungs are decreased bilaterally, but they are symmetric. No wheezing.    +2 bilateral symmetric pitting edema with no skin changes bilaterally.   CXR  nad.   Given IV Bumex x1   crt at baseline (1.5-2.0 )      baseline hgb 7-8    7/30   Admit   IP status MS Level of care  w/continuous pulse oximetry and telemetry, for  acute resp distress 2/2  acute on chronic Diastolic CHF  (volume overload)    Initiate IV diuresis , restrict fluid intake ,  monitor I/O q shift, daily wgt.  Scd's b/l LE. Fluid restrictions, intake and output , consult cardiology       7/30  HEART FAILURE TEAM Consult:   volume overload,  stable renal function.  CBC WNL.  Maintaining SR and normotensive.  unclear cause of decompensation:  Monitor response to bumex 10mg IV from ED.   Strict I/O and daily weights;  Keep K> 4 and Mg > 2     Anticipated Length of Stay/Certification Statement: The patient will continue to require additional inpatient hospital stay due to heart failure evaluation     Date: 7/31   Day 2: reports feeling somewhat improved.  Still unable to lie entirely flat.  Still with persistent lower extremity edema.     7/31   CARDIOLOGY:   current exacerbation with unclear trigger, possibly dietary or pulmonary component, ?diuretic resistance.  S/P IV Bumex 10 mg with unclear response. Patient reporting good urine output with some symptomatic improvement today.  Will give bolus dose of Bumex IV 5 mg now followed by gtt at 1 mg/hr. Check q12 BMPs.       Intake/Output Summary (Last 24 hours) at 7/31/2024 0918  Last data filed at 7/30/2024 2157      Gross per 24 hour   Intake --   Output 800 ml   Net -800 ml         ED Triage Vitals [07/30/24 1340]   Temperature Pulse Respirations Blood Pressure SpO2 Pain Score   98 °F (36.7 °C) 83 22 109/58 96 % 5     Weight (last 2 days)       Date/Time Weight    07/31/24 1251 150 (330.5)    07/31/24 0930 147 (325)            Vital Signs (last 3 days)        Date/Time Temp Pulse Resp BP MAP (mmHg) SpO2 O2 Device O2 Interface Device Patient Position - Orthostatic VS Himrod Coma Scale Score Pain    07/31/24 1251 98.1 °F (36.7 °C) 80 20 135/66 89 97 % None (Room air) -- -- -- --    07/31/24 1245 -- -- -- -- -- -- -- -- -- 15 No Pain    07/31/24 0930 -- 77 20 139/69 98 98 % -- -- -- -- --    07/31/24 0615 -- 81 17 138/64 92 97 % None (Room air) -- Lying -- --    07/30/24 1641 -- -- -- -- -- 98 % -- HFNC prongs -- -- --    07/30/24 1630 -- 81 24 131/63 90 94 % None (Room air) -- Lying -- 5    07/30/24 1503 -- -- -- -- -- -- -- Face mask -- -- --    07/30/24 1340 98 °F (36.7 °C) 83 22 109/58 -- 96 % None (Room air) -- Lying -- 5              Pertinent Labs/Diagnostic Test Results:   Radiology:    7/30  EKG interpretation: Sinus, normal QT, no evidence of ischemia or ectopy     XR chest 1 view portable   Final Interpretation by Al Jha MD (07/30 1602)      No acute cardiopulmonary disease.            Workstation performed: [id number]           Results from last 7 days   Lab Units 07/30/24  1629   SARS-COV-2  Negative     Results from last 7 days   Lab Units 07/31/24  0612 07/30/24  1355   WBC Thousand/uL 6.86 8.20   HEMOGLOBIN g/dL 8.9* 10.1*   HEMATOCRIT % 29.4* 33.3*   PLATELETS Thousands/uL 228 257   TOTAL NEUT ABS Thousands/µL  --  5.90         Results from last 7 days   Lab Units 07/31/24  0612 07/30/24  1355   SODIUM mmol/L 138 135   POTASSIUM mmol/L 4.2 4.1   CHLORIDE mmol/L 101 104   CO2 mmol/L 27 20*   ANION GAP mmol/L 10 11   BUN mg/dL 27* 24   CREATININE mg/dL 1.40* 1.28   EGFR ml/min/1.73sq m 55 61   CALCIUM mg/dL 8.2* 8.1*   MAGNESIUM mg/dL 1.7*  --    PHOSPHORUS mg/dL 3.8  --      Results from last 7 days   Lab Units 07/30/24  1355   AST U/L 9*   ALT U/L 6*   ALK PHOS U/L 95   TOTAL PROTEIN g/dL 6.3*   ALBUMIN g/dL 3.6   TOTAL BILIRUBIN mg/dL 0.40     Results from last 7 days   Lab Units 07/31/24  1208   POC GLUCOSE mg/dl 261*     Results from  last 7 days   Lab Units 07/31/24  0612 07/30/24  1355   GLUCOSE RANDOM mg/dL 125 219*     Results from last 7 days   Lab Units 07/30/24  2049 07/30/24  1655 07/30/24  1355   HS TNI 0HR ng/L  --   --  13   HS TNI 2HR ng/L  --  14  --    HSTNI D2 ng/L  --  1  --    HS TNI 4HR ng/L 13  --   --    HSTNI D4 ng/L 0  --   --      Results from last 7 days   Lab Units 07/30/24  1355   BNP pg/mL 267*     Results from last 7 days   Lab Units 07/30/24  1629   INFLUENZA A PCR  Negative   INFLUENZA B PCR  Negative   RSV PCR  Negative             ED Treatment-Medication Administration from 07/30/2024 1333 to 07/31/2024 1242         Date/Time Order Dose Route Action     07/30/2024 1537 bumetanide (BUMEX) injection 10 mg 10 mg Intravenous Given     07/31/2024 0930 atorvastatin (LIPITOR) tablet 10 mg 10 mg Oral Given     07/30/2024 1917 budesonide-formoterol (SYMBICORT) 160-4.5 mcg/act inhaler 2 puff 2 puff Inhalation Given     07/31/2024 0929 budesonide-formoterol (SYMBICORT) 160-4.5 mcg/act inhaler 2 puff 2 puff Inhalation Given     07/30/2024 1917 apixaban (ELIQUIS) tablet 5 mg 5 mg Oral Given     07/31/2024 0930 apixaban (ELIQUIS) tablet 5 mg 5 mg Oral Given     07/31/2024 0930 Empagliflozin (JARDIANCE) tablet 10 mg 10 mg Oral Given     07/30/2024 2156 montelukast (SINGULAIR) tablet 10 mg 10 mg Oral Given     07/31/2024 0930 metoprolol succinate (TOPROL-XL) 24 hr tablet 50 mg 50 mg Oral Given     07/30/2024 2156 pregabalin (LYRICA) capsule 50 mg 50 mg Oral Given     07/31/2024 0930 spironolactone (ALDACTONE) tablet 25 mg 25 mg Oral Given     07/31/2024 0929 umeclidinium 62.5 mcg/actuation inhaler AEPB 1 puff 1 puff Inhalation Given     07/31/2024 1209 insulin lispro (HumALOG/ADMELOG) 100 units/mL subcutaneous injection 2-12 Units 6 Units Subcutaneous Given     07/31/2024 1026 magnesium sulfate 2 g/50 mL IVPB (premix) 2 g 2 g Intravenous New Bag            Past Medical History:   Diagnosis Date    Acute gastritis without  hemorrhage     last assessed 3/24/17    Asthma     Atrial fibrillation ([facility])     Bilateral leg edema 02/19/2020    CHF (congestive heart failure) ([facility])     Coronary artery disease     Daytime sleepiness 07/17/2019    Diabetes mellitus ([facility])     Dyspnea on exertion 10/11/2021    Edema of both feet 01/23/2020    Gastric bypass status for obesity     Hyperlipidemia     Hypertension     Hypokalemia 11/14/2021    Impaired fasting glucose     last assessed 5/10/17    Knee sprain, bilateral 06/14/2018    Leg cramps 06/16/2022    Obesity     Viral gastroenteritis     last assessed 11/4/16     Present on Admission:   Morbid obesity ([facility])   Paroxysmal atrial fibrillation ([facility])   CKD (chronic kidney disease) stage 3 ([facility])   Moderate persistent asthma without complication   Acute on chronic diastolic congestive heart failure ([facility])   Diabetic polyneuropathy associated with type 2 diabetes mellitus ([facility])   VICKY (obstructive sleep apnea)   Hyperlipidemia      Admitting Diagnosis: Acute on chronic congestive heart failure, unspecified heart failure type ([facility]) [I50.9]  Age/Sex: 59 y.o. male  Admission Orders:  Scheduled Medications:  apixaban, 5 mg, Oral, BID  atorvastatin, 10 mg, Oral, Daily  budesonide-formoterol, 2 puff, Inhalation, BID  bumetanide, 5 mg, Intravenous, Once  Empagliflozin, 10 mg, Oral, Daily  fluticasone, 1 spray, Nasal, BID  insulin lispro, 2-12 Units, Subcutaneous, TID AC  insulin lispro, 2-12 Units, Subcutaneous, HS  metoprolol succinate, 50 mg, Oral, Daily  montelukast, 10 mg, Oral, HS  potassium chloride, 20 mEq, Oral, Once  pregabalin, 50 mg, Oral, HS  spironolactone, 25 mg, Oral, Daily  umeclidinium, 1 puff, Inhalation, Daily      Continuous IV Infusions:  bumetanide (BUMEX) 12.5 mg infusion 50 mL, 1 mg/hr, Intravenous, Continuous      PRN Meds:  acetaminophen, 650 mg, Oral, Q6H PRN  albuterol, 2 puff, Inhalation, Q4H PRN  ondansetron, 4 mg, Intravenous, Q6H PRN        Network Utilization Review  Department  ATTENTION: Please call with any questions or concerns to [phone] and carefully listen to the prompts so that you are directed to the right person. All voicemails are confidential.   For Discharge needs, contact Care Management DC Support Team at [phone] opt. 2  Send all requests for admission clinical reviews, approved or denied determinations and any other requests to dedicated fax number below belonging to the campus where the patient is receiving treatment. List of dedicated fax numbers for the Facilities:  FACILITY NAME UR FAX NUMBER   ADMISSION DENIALS (Administrative/Medical Necessity) [phone]   DISCHARGE SUPPORT TEAM (NETWORK) [phone]   PARENT CHILD HEALTH (Maternity/NICU/Pediatrics) [phone]   [facility] [phone]   [facility] [phone]   [facility] [phone]   [facility] [phone]   [facility] [phone]   [facility] [phone]   [facility] [phone]   [facility] [phone]   [facility] [phone]   [facility] [phone]   [facility] [phone]   [facility] [phone]

## 2024-07-31 NOTE — PROGRESS NOTES
Heart Failure/ Pulmonary Hypertension Progress Note - Mesfin Cano 59 y.o. male MRN: [id number]    Unit/Bed#: ED 06 Encounter: [id number]      Assessment:    Principal Problem:    Acute on chronic diastolic congestive heart failure (HCC)  Active Problems:    Hyperlipidemia    Morbid obesity (HCC)    VICKY (obstructive sleep apnea)    Paroxysmal atrial fibrillation (HCC)    CKD (chronic kidney disease) stage 3 (HCC)    Diabetic polyneuropathy associated with type 2 diabetes mellitus (HCC)    Moderate persistent asthma without complication      Subjective:   59 year old with PMH as above who presented to the ED today with complaints of worsening SOB, leg swelling and abdominal distension. CardioMems readings above goal since 7/21. He is already on high dose diuretics along with MRA and SGLT2i. He reports no significant dietary indiscretions. Has been using Metolazone more frequently but with inadequate response. Of note, recently admitted on  6/18/2024 due to 1 week history of chest pain and shortness of breath.  In the ER he was noted to have uncontrolled A-fib/flutter.  Underwent cardioversion after which symptoms resolved.  Patient continued with chest pain and stress test was performed which was abnormal as above, however general cardiology team felt results were similar compared to prior and recommended outpatient CTA.  Was euvolemic while admitted     Patient seen and examined.  No significant events overnight. Still in ED     Objective:   Intake/ Output: -800  Weight: no weight  Tele: SR  MAPs: 90s    Acute on Chronic HFpEF, Stage C, NYHA III.    -PAD above goal since end June in the low 20's  -current exacerbation with unclear trigger, possibly dietary or pulmonary component, ?diuretic resistance.  -S/P IV Bumex 10 mg with unclear response. Patient reporting good urine output with some symptomatic improvement today.   -Would benefit from continuous infusion. Will give bolus dose of Bumex IV 5 mg now followed  by gtt at 1 mg/hr.   -Check q12 BMPs.     Volume management-home :  Bumex 6 mg PO BID, Jardiance 10 mg daily, Spironolactone 25 mg daily, Metolazone 2.5 mg PRN     Cardiomems Data: PAD Goal- 15 mmhg     7/21 - 20.  7/22 - 25.  7/23 - 21.  7/24 - 20.  7/25 - 18.  7/26 - 22.       7/27 - No Reading  7/28 - 20.  7/29 - 21.  7/30 - 18        NPI 6/25/24:  There is a left ventricular perfusion defect that is medium in size with moderate reduction in uptake present in the entire inferior and inferolateral location(s) that is partially reversible. The possibility of this defect being caused by diaphragmatic attenuation artifact cannot be completely excluded.      Echo 6/20/24 EF 55%, akinetic basal inferolateral and mid inferolateral      RHC with Cardiomems calibration:  CardioMEMS rep Maciel Shabazz was present throughout the case and provided the simultaneous MEMS interrogation data.     -140 during case  HR 74  O2 sat 97%  Hgb 10.5     RA 8  RV 30/8  PA 28/12, 17  PCWP 10-12 (multiple checks)  TPG 5  PA sat 59% (multiple checks)  Travis CO/CI: 6.6/2.3  TD CO/CI: 7.43/2.78     Simultaneous CardioMEMS data:  Mean: 26/10/15  End expiration on MEMS waveform: 28/10      Impression/Recommendations:  The patient tolerated the procedure well; no immediate complications.  Normal biv filling pressures (after inpt diuresis).  Normal CO/CI.  CardioMEMS data correlates to RHC.  s/p CardioMems implant 3/9/22, GOAL PAD 15-18  PAD 16 10/6, 14 10/5.      BNP 10/6/23: 93  122  233      Studies  LHC 9/6/22: No obstructive CAD  Pharm Nuc Stress 9/2/22: Abnormal study due to the presence of an inferior and inferolateral infarct without significant ischemia.  RHC 3/9/22:   RA 4   RV 35/4   PA 35/12/21   PCWP 10   PA sat 64%   Travis CO 5.56 L/min   Travis CI 2.2L/min/m2   PVR 1.97 SAGASTUME   TTE 4/11/2023: LVEF 50%.  Normal wall motion.  Grade 2 DD.  RV cavity size and systolic function normal.  LA mildly dilated.  Trace MR, TR.  Normal IVC.  TTE  03/25/2020: LVEF 40-45%. LVIDd 6.12 cm. Normal RV.    TTE 07/19/2021: LVEF 50%. LVIDd 6.13 cm. Grade 1 DD. Normal RV. Trace MR and TR. Mildly dilated IVC.    TTE 11/12/2021: LVEF 55%. LVIDd 6.0 cm. Grade 1 DD. Normal RV. Trace TR.       Syncope with fall  -Had recent  isolated episode at home  -unconscious for a few seconds per patient report  -possibly 2/2 orthostatic hypotension ?  -Zio patch was placed but fell off     Atrial fibrillation/flutter with RVR   Currently maintaining SR  [id number] = 3 (HF, HTN, DM).   Anticoagulation on Eliquis.    Rate control: Metop succinate 50 mg daily   Rhythm control:S/P DCCV     Hypertension   Hyperlipidemia         Lab Results   Component Value Date     LDLCALC 86 10/06/2023    Rx: atorvastatin 10 mg daily.      Diabetes mellitus, type II     Non-insulin dependent.             Lab Results   Component Value Date     HGBA1C 8.3 (H) 06/19/2024      Obstructive sleep apnea  Not currently using CPAP given machine recall. Needs repeat sleep study  Chronic respiratory failure   Asthma, moderate persistent    S/p gastric bypass ([facility])surgery    History of tobacco abuse   Carpal tunnel syndrome, bilateral    CKD, recent baseline 1.5-2.0. Stable.   Eosinophilia/anemia. Unclear etiology. For BMB in upcoming weeks. Follows with hem/onc.     Review of Systems   All other systems reviewed and are negative.       Central Line (day, reason):  Cotton catheter (day, reason):    Vitals: Blood pressure 138/64, pulse 81, temperature 98 °F (36.7 °C), resp. rate 17, SpO2 97%., There is no height or weight on file to calculate BMI., I/O last 3 completed shifts:  In: -   Out: 800 [Urine:800]  No intake/output data recorded.  Wt Readings from Last 3 Encounters:   07/19/24 (!) 150 kg (330 lb)   06/26/24 (!) 149 kg (328 lb 3.2 oz)   05/24/24 (!) 150 kg (331 lb 9.6 oz)       Intake/Output Summary (Last 24 hours) at 7/31/2024 0917  Last data filed at 7/30/2024 2157  Gross per 24 hour   Intake --    Output 800 ml   Net -800 ml     I/O last 3 completed shifts:  In: -   Out: 800 [Urine:800]    No significant arrhythmias seen on telemetry review.       Physical Exam:  Vitals:    07/30/24 1340 07/30/24 1630 07/30/24 1641 07/31/24 0615   BP: 109/58 131/63  138/64   BP Location: Right arm Right arm  Right arm   Pulse: 83 81  81   Resp: 22 (!) 24  17   Temp: 98 °F (36.7 °C)      SpO2: 96% 94% 98% 97%       GEN: Mesfin Cano appears well, alert and oriented x 3, pleasant and cooperative   HEENT: pupils equal, round, and reactive to light; extraocular muscles intact  NECK: supple, no carotid bruits   HEART: regular rhythm, normal S1 and S2, no murmurs, clicks, gallops or rubs, JVP is  elevated.   LUNGS: clear to auscultation bilaterally; no wheezes, rales, or rhonchi   ABDOMEN: normal bowel sounds, soft, no tenderness, no distention  EXTREMITIES: peripheral pulses normal; no clubbing, cyanosis, or edema  NEURO: no focal findings   SKIN: normal without suspicious lesions on exposed skin      Current Facility-Administered Medications:     acetaminophen (TYLENOL) tablet 650 mg, 650 mg, Oral, Q6H PRN, Devang Ingram MD    albuterol (PROVENTIL HFA,VENTOLIN HFA) inhaler 2 puff, 2 puff, Inhalation, Q4H PRN, Devang Ingram MD    apixaban (ELIQUIS) tablet 5 mg, 5 mg, Oral, BID, Devang Ingram MD, 5 mg at 07/30/24 1917    atorvastatin (LIPITOR) tablet 10 mg, 10 mg, Oral, Daily, Devang Ingram MD    budesonide-formoterol (SYMBICORT) 160-4.5 mcg/act inhaler 2 puff, 2 puff, Inhalation, BID, Devang Ingram MD, 2 puff at 07/30/24 1917    Empagliflozin (JARDIANCE) tablet 10 mg, 10 mg, Oral, Daily, Devang Ingram MD    fluticasone (FLONASE) 50 mcg/act nasal spray 1 spray, 1 spray, Nasal, BID, Devang Ingram MD    insulin lispro (HumALOG/ADMELOG) 100 units/mL subcutaneous injection 2-12 Units, 2-12 Units, Subcutaneous, TID MICHEAL **AND** Fingerstick Glucose (POCT), , , TID AC, RODRIGUE Ivy    insulin lispro (HumALOG/ADMELOG) 100 units/mL  subcutaneous injection 2-12 Units, 2-12 Units, Subcutaneous, HS, Alicia MARLIN RODRIGUE Craven    metoprolol succinate (TOPROL-XL) 24 hr tablet 50 mg, 50 mg, Oral, Daily, Devang Ingram MD    montelukast (SINGULAIR) tablet 10 mg, 10 mg, Oral, HS, Devang Ingram MD, 10 mg at 07/30/24 2156    ondansetron (ZOFRAN) injection 4 mg, 4 mg, Intravenous, Q6H PRN, Devang Ingram MD    pregabalin (LYRICA) capsule 50 mg, 50 mg, Oral, HS, Devang Ingram MD, 50 mg at 07/30/24 2156    spironolactone (ALDACTONE) tablet 25 mg, 25 mg, Oral, Daily, Devang Ingram MD    umeclidinium 62.5 mcg/actuation inhaler AEPB 1 puff, 1 puff, Inhalation, Daily, Devang Ingram MD    Current Outpatient Medications:     Accu-Chek FastClix Lancets MISC, Test blood sugar twice daily, Disp: 200 each, Rfl: 3    acetaminophen (TYLENOL) 325 mg tablet, Take 2 tablets (650 mg total) by mouth every 6 (six) hours as needed for mild pain, headaches or fever, Disp: , Rfl:     albuterol (PROVENTIL HFA,VENTOLIN HFA) 90 mcg/act inhaler, Inhale 2 puffs every 4 (four) hours as needed for wheezing or shortness of breath, Disp: 18 g, Rfl: 5    aluminum-magnesium hydroxide 200-200 MG/5ML suspension, Take 5 mL by mouth every 6 (six) hours as needed for heartburn, Disp: 355 mL, Rfl: 0    atorvastatin (LIPITOR) 10 mg tablet, TAKE 1 TABLET BY MOUTH EVERY DAY, Disp: 90 tablet, Rfl: 3    Blood Glucose Monitoring Suppl (Accu-Chek Guide) w/Device KIT, Use 2 (two) times a day Test blood sugar twice daily, Disp: 1 kit, Rfl: 0    budesonide-formoterol (Symbicort) 160-4.5 mcg/act inhaler, Inhale 2 puffs 2 (two) times a day Rinse mouth after use., Disp: 30.6 g, Rfl: 2    bumetanide (BUMEX) 2 mg tablet, Take 3 tablets (6 mg total) by mouth 2 (two) times a day, Disp: 540 tablet, Rfl: 1    Eliquis 5 MG, TAKE 1 TABLET BY MOUTH TWICE A DAY, Disp: 60 tablet, Rfl: 5    Empagliflozin (JARDIANCE) 10 MG TABS tablet, Take 1 tablet (10 mg total) by mouth daily, Disp: 30 tablet, Rfl: 11    EPINEPHrine (EPIPEN) 0.3  mg/0.3 mL SOAJ, Inject 0.3 mL (0.3 mg total) into a muscle once for 1 dose, Disp: 0.6 mL, Rfl: 0    famotidine (PEPCID) 20 mg tablet, Take 1 tablet (20 mg total) by mouth 2 (two) times a day, Disp: 30 tablet, Rfl: 0    fluticasone (FLONASE) 50 mcg/act nasal spray, 1 spray into each nostril 2 (two) times a day, Disp: , Rfl: 0    Klor-Con M20 20 MEQ tablet, TAKE 2 TABLETS BY MOUTH 2 TIMES A DAY., Disp: 360 tablet, Rfl: 2    loratadine (CLARITIN) 10 mg tablet, Take 1 tablet (10 mg total) by mouth daily Do not start before October 13, 2023., Disp: , Rfl: 0    metolazone (ZAROXOLYN) 2.5 mg tablet, Take 2 tablets (5 mg total) by mouth if needed (weight gain of 3+ lbs in 24 hours, 5+ lbs in one week or signs of worsening fluid retention) Take 20-30 minutes before Bumex dose and with additional potassium, Disp: , Rfl:     metoprolol succinate (TOPROL-XL) 50 mg 24 hr tablet, Take 1 tablet (50 mg total) by mouth daily, Disp: 30 tablet, Rfl: 5    montelukast (SINGULAIR) 10 mg tablet, Take 1 tablet (10 mg total) by mouth daily at bedtime, Disp: 90 tablet, Rfl: 1    nitroglycerin (NITROSTAT) 0.4 mg SL tablet, Place 1 tablet (0.4 mg total) under the tongue every 5 (five) minutes as needed for chest pain for up to 50 doses, Disp: 50 tablet, Rfl: 0    omeprazole (PriLOSEC) 20 mg delayed release capsule, Take 1 capsule (20 mg total) by mouth daily for 14 days, Disp: 14 capsule, Rfl: 0    pregabalin (LYRICA) 50 mg capsule, Take 1 caps. at bedtime, Disp: 30 capsule, Rfl: 5    sitaGLIPtin (Januvia) 100 mg tablet, TAKE 1/2 TABLET BY MOUTH EVERY DAY, Disp: 15 tablet, Rfl: 5    spironolactone (ALDACTONE) 25 mg tablet, TAKE 1 TABLET (25 MG TOTAL) BY MOUTH DAILY., Disp: 90 tablet, Rfl: 1    tiotropium (Spiriva Respimat) 1.25 MCG/ACT AERS inhaler, Inhale 2 puffs daily, Disp: 4 g, Rfl: 0      Labs & Results:        Results from last 7 days   Lab Units 07/31/24  0612 07/30/24  1355   WBC Thousand/uL 6.86 8.20   HEMOGLOBIN g/dL 8.9* 10.1*    HEMATOCRIT % 29.4* 33.3*   PLATELETS Thousands/uL 228 257         Results from last 7 days   Lab Units 07/31/24  0612 07/30/24  1355   POTASSIUM mmol/L 4.2 4.1   CHLORIDE mmol/L 101 104   CO2 mmol/L 27 20*   BUN mg/dL 27* 24   CREATININE mg/dL 1.40* 1.28   CALCIUM mg/dL 8.2* 8.1*   ALK PHOS U/L  --  95   ALT U/L  --  6*   AST U/L  --  9*           Counseling / Coordination of Care    Thank you for the opportunity to participate in the care of this patient.    Capri HUSAIN

## 2024-07-31 NOTE — ASSESSMENT & PLAN NOTE
Without acute exacerbation   Continue home inhalers Symbicort, umeclidinium, Singulair.    Albuterol as needed

## 2024-07-31 NOTE — ASSESSMENT & PLAN NOTE
59-year-old male with PMH of diastolic CHF, A-fib, presented to ED with complaint of worsening dyspnea, lower extremity edema, and orthopnea.  Patient denies excessive fluid intake or salt intake.  Patient has been compliant with his home Bumex 6 mg twice daily.  In ED, initially patient was requiring HFNC briefly, subsequently taken off currently on room air.  CXR WNL  S/P bumex 10 mg IV x 1 with about 1 L out per patient.  Continue spironolactone as per PTA. Will defer further diuretic management to heart failure team, consult pending.   GDMT: Continue BB, Jardiance.   Recent echo 6/2024 showed EF 55%.  LA/RA dilated.   I/O, daily weights, Low NA diet with FR  Monitor volume status

## 2024-07-31 NOTE — ASSESSMENT & PLAN NOTE
Affects all aspects of health, weight loss advised.   Prior hx of Samuel-en-Y gastric bypass noted

## 2024-07-31 NOTE — ASSESSMENT & PLAN NOTE
Affects all aspects of health, weight loss advised.   Prior hx of Samuel-en-Y gastric bypass noted   BMI of 42.76

## 2024-07-31 NOTE — UTILIZATION REVIEW
NOTIFICATION OF INPATIENT ADMISSION   AUTHORIZATION REQUEST   SERVICING FACILITY:   [facility]  Address: [address] [place] [address]  Tax ID: [id number]  NPI: [id number] ATTENDING PROVIDER:  Attending Name and NPI#: Rachele Pyle Md [[id number]]  Address: [address] [place] [address]  Phone: [phone]   ADMISSION INFORMATION:  Place of Service: Inpatient [facility]  Place of Service Code: 21  Inpatient Admission Date/Time: 7/30/24  5:02 PM  Discharge Date/Time: No discharge date for patient encounter.  Admitting Diagnosis Code/Description:  Acute on chronic congestive heart failure, unspecified heart failure type (HCC) [I50.9]     UTILIZATION REVIEW CONTACT:  Bayron Candelaria Utilization   Network Utilization Review Department  Phone: [phone]  Fax: [phone]  Email: [email or web address]  Contact for approvals/pending authorizations, clinical reviews, and discharge.     PHYSICIAN ADVISORY SERVICES:  Medical Necessity Denial & [id number] Review  Phone: [phone]  Fax: [phone]  Email: [email or web address]     DISCHARGE SUPPORT TEAM:  For Patients Discharge Needs & Updates  Phone: [phone] opt. 2 Fax: [phone]  Email: [email or web address]

## 2024-08-01 PROBLEM — J96.01 ACUTE RESPIRATORY FAILURE WITH HYPOXIA (HCC): Status: ACTIVE | Noted: 2024-08-01

## 2024-08-01 LAB
ANION GAP SERPL CALCULATED.3IONS-SCNC: 11 MMOL/L (ref 4–13)
ANION GAP SERPL CALCULATED.3IONS-SCNC: 15 MMOL/L (ref 4–13)
BASOPHILS # BLD AUTO: 0.04 THOUSANDS/ÂΜL (ref 0–0.1)
BASOPHILS NFR BLD AUTO: 1 % (ref 0–1)
BUN SERPL-MCNC: 28 MG/DL (ref 5–25)
BUN SERPL-MCNC: 31 MG/DL (ref 5–25)
CALCIUM SERPL-MCNC: 8.3 MG/DL (ref 8.4–10.2)
CALCIUM SERPL-MCNC: 8.8 MG/DL (ref 8.4–10.2)
CHLORIDE SERPL-SCNC: 92 MMOL/L (ref 96–108)
CHLORIDE SERPL-SCNC: 96 MMOL/L (ref 96–108)
CO2 SERPL-SCNC: 25 MMOL/L (ref 21–32)
CO2 SERPL-SCNC: 33 MMOL/L (ref 21–32)
CREAT SERPL-MCNC: 1.71 MG/DL (ref 0.6–1.3)
CREAT SERPL-MCNC: 1.9 MG/DL (ref 0.6–1.3)
EOSINOPHIL # BLD AUTO: 0.49 THOUSAND/ÂΜL (ref 0–0.61)
EOSINOPHIL NFR BLD AUTO: 8 % (ref 0–6)
ERYTHROCYTE [DISTWIDTH] IN BLOOD BY AUTOMATED COUNT: 20.8 % (ref 11.6–15.1)
GFR SERPL CREATININE-BSD FRML MDRD: 38 ML/MIN/1.73SQ M
GFR SERPL CREATININE-BSD FRML MDRD: 43 ML/MIN/1.73SQ M
GLUCOSE SERPL-MCNC: 138 MG/DL (ref 65–140)
GLUCOSE SERPL-MCNC: 145 MG/DL (ref 65–140)
GLUCOSE SERPL-MCNC: 150 MG/DL (ref 65–140)
GLUCOSE SERPL-MCNC: 182 MG/DL (ref 65–140)
GLUCOSE SERPL-MCNC: 183 MG/DL (ref 65–140)
GLUCOSE SERPL-MCNC: 206 MG/DL (ref 65–140)
HCT VFR BLD AUTO: 34.7 % (ref 36.5–49.3)
HGB BLD-MCNC: 10.2 G/DL (ref 12–17)
IMM GRANULOCYTES # BLD AUTO: 0.03 THOUSAND/UL (ref 0–0.2)
IMM GRANULOCYTES NFR BLD AUTO: 1 % (ref 0–2)
LYMPHOCYTES # BLD AUTO: 0.97 THOUSANDS/ÂΜL (ref 0.6–4.47)
LYMPHOCYTES NFR BLD AUTO: 15 % (ref 14–44)
MAGNESIUM SERPL-MCNC: 2.1 MG/DL (ref 1.9–2.7)
MCH RBC QN AUTO: 25.1 PG (ref 26.8–34.3)
MCHC RBC AUTO-ENTMCNC: 29.4 G/DL (ref 31.4–37.4)
MCV RBC AUTO: 85 FL (ref 82–98)
MONOCYTES # BLD AUTO: 0.56 THOUSAND/ÂΜL (ref 0.17–1.22)
MONOCYTES NFR BLD AUTO: 9 % (ref 4–12)
NEUTROPHILS # BLD AUTO: 4.43 THOUSANDS/ÂΜL (ref 1.85–7.62)
NEUTS SEG NFR BLD AUTO: 66 % (ref 43–75)
NRBC BLD AUTO-RTO: 0 /100 WBCS
PLATELET # BLD AUTO: 257 THOUSANDS/UL (ref 149–390)
PMV BLD AUTO: 10.4 FL (ref 8.9–12.7)
POTASSIUM SERPL-SCNC: 3.4 MMOL/L (ref 3.5–5.3)
POTASSIUM SERPL-SCNC: 4 MMOL/L (ref 3.5–5.3)
RBC # BLD AUTO: 4.07 MILLION/UL (ref 3.88–5.62)
SODIUM SERPL-SCNC: 136 MMOL/L (ref 135–147)
SODIUM SERPL-SCNC: 136 MMOL/L (ref 135–147)
WBC # BLD AUTO: 6.52 THOUSAND/UL (ref 4.31–10.16)

## 2024-08-01 PROCEDURE — 80048 BASIC METABOLIC PNL TOTAL CA: CPT | Performed by: NURSE PRACTITIONER

## 2024-08-01 PROCEDURE — 83735 ASSAY OF MAGNESIUM: CPT | Performed by: NURSE PRACTITIONER

## 2024-08-01 PROCEDURE — 99232 SBSQ HOSP IP/OBS MODERATE 35: CPT | Performed by: PHYSICIAN ASSISTANT

## 2024-08-01 PROCEDURE — 82948 REAGENT STRIP/BLOOD GLUCOSE: CPT

## 2024-08-01 PROCEDURE — 85025 COMPLETE CBC W/AUTO DIFF WBC: CPT | Performed by: NURSE PRACTITIONER

## 2024-08-01 PROCEDURE — 99232 SBSQ HOSP IP/OBS MODERATE 35: CPT | Performed by: INTERNAL MEDICINE

## 2024-08-01 RX ORDER — POTASSIUM CHLORIDE 20 MEQ/1
40 TABLET, EXTENDED RELEASE ORAL 2 TIMES DAILY
Status: COMPLETED | OUTPATIENT
Start: 2024-08-01 | End: 2024-08-01

## 2024-08-01 RX ADMIN — ATORVASTATIN CALCIUM 10 MG: 10 TABLET, FILM COATED ORAL at 08:43

## 2024-08-01 RX ADMIN — INSULIN LISPRO 4 UNITS: 100 INJECTION, SOLUTION INTRAVENOUS; SUBCUTANEOUS at 11:57

## 2024-08-01 RX ADMIN — PREGABALIN 50 MG: 50 CAPSULE ORAL at 21:14

## 2024-08-01 RX ADMIN — INSULIN LISPRO 2 UNITS: 100 INJECTION, SOLUTION INTRAVENOUS; SUBCUTANEOUS at 06:34

## 2024-08-01 RX ADMIN — Medication 1 MG/HR: at 06:37

## 2024-08-01 RX ADMIN — APIXABAN 5 MG: 5 TABLET, FILM COATED ORAL at 08:43

## 2024-08-01 RX ADMIN — UMECLIDINIUM 1 PUFF: 62.5 AEROSOL, POWDER ORAL at 09:37

## 2024-08-01 RX ADMIN — INSULIN LISPRO 2 UNITS: 100 INJECTION, SOLUTION INTRAVENOUS; SUBCUTANEOUS at 21:16

## 2024-08-01 RX ADMIN — SPIRONOLACTONE 25 MG: 25 TABLET ORAL at 08:43

## 2024-08-01 RX ADMIN — BUDESONIDE AND FORMOTEROL FUMARATE DIHYDRATE 2 PUFF: 160; 4.5 AEROSOL RESPIRATORY (INHALATION) at 17:22

## 2024-08-01 RX ADMIN — Medication 1 MG/HR: at 18:29

## 2024-08-01 RX ADMIN — POTASSIUM CHLORIDE 40 MEQ: 1500 TABLET, EXTENDED RELEASE ORAL at 08:43

## 2024-08-01 RX ADMIN — POTASSIUM CHLORIDE 40 MEQ: 1500 TABLET, EXTENDED RELEASE ORAL at 17:22

## 2024-08-01 RX ADMIN — BUDESONIDE AND FORMOTEROL FUMARATE DIHYDRATE 2 PUFF: 160; 4.5 AEROSOL RESPIRATORY (INHALATION) at 08:45

## 2024-08-01 RX ADMIN — METOPROLOL SUCCINATE 50 MG: 50 TABLET, EXTENDED RELEASE ORAL at 08:43

## 2024-08-01 RX ADMIN — MONTELUKAST 10 MG: 10 TABLET, FILM COATED ORAL at 21:14

## 2024-08-01 RX ADMIN — APIXABAN 5 MG: 5 TABLET, FILM COATED ORAL at 17:22

## 2024-08-01 RX ADMIN — EMPAGLIFLOZIN 10 MG: 10 TABLET, FILM COATED ORAL at 08:43

## 2024-08-01 NOTE — PROGRESS NOTES
[facility]  Progress Note  Name: Mesfin Cano I  MRN: [id number]  Unit/Bed#: [facility] 508-01 I Date of Admission: 7/30/2024   Date of Service: 8/1/2024 I Hospital Day: 2    Assessment & Plan   * Acute on chronic diastolic congestive heart failure (HCC)  Assessment & Plan  Pt presented to ED with complaint of worsening dyspnea, lower extremity edema, and orthopnea.  Patient denies excessive fluid intake or salt intake.  Patient has been compliant with his home Bumex 6 mg twice daily.  In ED, initially patient was requiring HFNC briefly, subsequently taken off currently on room air.  CXR WNL  Heart failure following,  Currently on IV Bumex gtt, continue   Continue spironolactone 25 mg daily   GDMT: Continue BB, Jardiance.   Recent echo 6/2024 showed EF 55%.  LA/RA dilated.   I/O, daily weights, Low NA diet with FR  Monitor volume status     Acute respiratory failure with hypoxia (HCC)  Assessment & Plan  POA, since resolved, due to acute CHF evidenced by increased WOB, SOB and tachypnea requiring Bipap and HFNC in the ED  Now saturating well on RA, continue IV diuresis per HF team     Anemia  Assessment & Plan  Baseline hgb appears 7-8. Due to eosinophilia in 2023 underwent BMB in December.    Per review, morphology evaluation, flow cytometry, cytogenetics, FISH studies and molecular studies all together did not demonstrate any evidence of a primary hematologic malignancy. Specifically pathologist thought that the elevated eosinophil count was secondary/reactive to the patient's medical issues per hematologist note  Not on PO iron due to hx of Samuel-en-Y gastric bypass  Monitor CBC, hgb currently 10.2 today        Moderate persistent asthma without complication  Assessment & Plan  Without acute exacerbation   Continue home inhalers Symbicort, umeclidinium, Singulair.    Albuterol as needed    Diabetic polyneuropathy associated with type 2 diabetes mellitus (HCC)  Assessment &  Plan  Lab Results   Component Value Date    HGBA1C 8.3 (H) 06/19/2024     Continue Lyrica for chronic neuropathy   Hold home Januvia  Continue Jardiance here for HF  Continue SSI coverage while inpatient   Monitor accuchecks and adjust regimen PRN      CKD (chronic kidney disease) stage 3 (HCC)  Assessment & Plan  Lab Results   Component Value Date    EGFR 55 07/31/2024    EGFR 61 07/30/2024    EGFR 52 07/19/2024    CREATININE 1.40 (H) 07/31/2024    CREATININE 1.28 07/30/2024    CREATININE 1.47 (H) 07/19/2024   Baseline around 1.7 per chart review. Currently stable below baseline  Monitor renal function with diuresis  Avoid nephrotoxins/hypotension  BMP in AM      Paroxysmal atrial fibrillation (HCC)  Assessment & Plan  Anticoagulated with Eliquis 5 mg BID    Rate controlled with Toprol XL 50 mg daily     VICKY (obstructive sleep apnea)  Assessment & Plan  Currently not using CPAP at bedtime  Encourage compliance     Morbid obesity (HCC)  Assessment & Plan  Affects all aspects of health, weight loss advised.   Prior hx of Samuel-en-Y gastric bypass noted   BMI of 42.76    Hyperlipidemia  Assessment & Plan  Continue Lipitor 10 mg daily          VTE Pharmacologic Prophylaxis:   High Risk (Score >/= 5) - Pharmacological DVT Prophylaxis Ordered: apixaban (Eliquis). Sequential Compression Devices Ordered.    Mobility:   JH-HLM Achieved: 7: Walk 25 feet or more  JH-HLM Goal achieved. Continue to encourage appropriate mobility.    Patient Centered Rounds: I evaluated the patient without nursing staff present due to speaking to nurse outside patient's room     Discussions with Specialists or Other Care Team Provider: Discussed with RN, BIANCA and reviewed HF notes     Education and Discussions with Family / Patient: Patient declined call to .     Total Time Spent on Date of Encounter in care of patient: 35 mins. This time was spent on one or more of the following: performing physical exam; counseling and coordination  of care; obtaining or reviewing history; documenting in the medical record; reviewing/ordering tests, medications or procedures; communicating with other healthcare professionals and discussing with patient's family/caregivers.    Current Length of Stay: 2 day(s)  Current Patient Status: Inpatient   Certification Statement: The patient will continue to require additional inpatient hospital stay due to IV bumex gtt, acute CHF  Discharge Plan: Anticipate discharge in 48-72 hrs to home.    Code Status: Level 1 - Full Code    Subjective:   Pt reports that he is doing well today. States that his breathing continues to improve, not yet back to baseline yet. Denies any chest pain, abdominal pain, nausea or vomiting. Appetite is good. Has been urinating well with the IV bumex. Last BM was a few days ago. Reports his lower extremity edema is much improved.     Objective:     Vitals:   Temp (24hrs), Av.4 °F (36.9 °C), Min:98 °F (36.7 °C), Max:99.4 °F (37.4 °C)    Temp:  [98 °F (36.7 °C)-99.4 °F (37.4 °C)] 98.2 °F (36.8 °C)  HR:  [70-83] 76  Resp:  [15-20] 20  BP: (124-135)/(66-75) 129/69  SpO2:  [91 %-97 %] 96 %  Body mass index is 42.76 kg/m².     Input and Output Summary (last 24 hours):     Intake/Output Summary (Last 24 hours) at 2024 1009  Last data filed at 2024 0830  Gross per 24 hour   Intake 1798.74 ml   Output 5275 ml   Net -3476.26 ml       Physical Exam:   Physical Exam  Vitals reviewed.   Constitutional:       Comments: Pt is in no acute distress sitting in his hospital bed resting comfortably   Saturating well on RA   Cardiovascular:      Rate and Rhythm: Normal rate and regular rhythm.   Pulmonary:      Effort: No respiratory distress.      Breath sounds: Normal breath sounds. No wheezing.   Musculoskeletal:      Right lower leg: Edema present.      Left lower leg: Edema present.      Comments: 1+ pitting edema lower extremities bilaterally    Skin:     General: Skin is warm and dry.   Neurological:       Mental Status: He is alert.          Additional Data:     Labs:  Results from last 7 days   Lab Units 08/01/24  0427   WBC Thousand/uL 6.52   HEMOGLOBIN g/dL 10.2*   HEMATOCRIT % 34.7*   PLATELETS Thousands/uL 257   SEGS PCT % 66   LYMPHO PCT % 15   MONO PCT % 9   EOS PCT % 8*     Results from last 7 days   Lab Units 08/01/24  0427 07/31/24  0612 07/30/24  1355   SODIUM mmol/L 136   < > 135   POTASSIUM mmol/L 3.4*   < > 4.1   CHLORIDE mmol/L 96   < > 104   CO2 mmol/L 25   < > 20*   BUN mg/dL 28*   < > 24   CREATININE mg/dL 1.71*   < > 1.28   ANION GAP mmol/L 15*   < > 11   CALCIUM mg/dL 8.3*   < > 8.1*   ALBUMIN g/dL  --   --  3.6   TOTAL BILIRUBIN mg/dL  --   --  0.40   ALK PHOS U/L  --   --  95   ALT U/L  --   --  6*   AST U/L  --   --  9*   GLUCOSE RANDOM mg/dL 145*   < > 219*    < > = values in this interval not displayed.         Results from last 7 days   Lab Units 08/01/24  0633 07/31/24  2037 07/31/24  1540 07/31/24  1208   POC GLUCOSE mg/dl 150* 165* 162* 261*               Lines/Drains:  Invasive Devices       Peripheral Intravenous Line  Duration             Peripheral IV 07/31/24 Right Antecubital 1 day                      Telemetry:  Telemetry Orders (From admission, onward)               24 Hour Telemetry Monitoring  Continuous x 24 Hours (Telem)        Question:  Reason for 24 Hour Telemetry  Answer:  Decompensated CHF- and any one of the following: continuous diuretic infusion or total diuretic dose >200 mg daily, associated electrolyte derangement (I.e. K < 3.0), ionotropic drip (continuous infusion), hx of ventricular arrhythmia, or new EF < 35%                     Telemetry Reviewed: Normal Sinus Rhythm, artifact   Indication for Continued Telemetry Use: Acute CHF on >200 mg lasix/day or equivalent dose or with new reduced EF.              Imaging: Reviewed radiology reports from this admission including: chest xray    Recent Cultures (last 7 days):         Last 24 Hours Medication List:    Current Facility-Administered Medications   Medication Dose Route Frequency Provider Last Rate    acetaminophen  650 mg Oral Q6H PRN Devang Ingram MD      albuterol  2 puff Inhalation Q4H PRN Devang Ingram MD      apixaban  5 mg Oral BID Devang Ingram MD      atorvastatin  10 mg Oral Daily Devang Ingram MD      budesonide-formoterol  2 puff Inhalation BID Devang Ingram MD      bumetanide (BUMEX) 12.5 mg infusion 50 mL  1 mg/hr Intravenous Continuous RODRIGUE Rodriges 1 mg/hr (08/01/24 0637)    Empagliflozin  10 mg Oral Daily Devang Ingram MD      fluticasone  1 spray Nasal BID Devang Ingram MD      insulin lispro  2-12 Units Subcutaneous TID AC RODRIGUE Ivy      insulin lispro  2-12 Units Subcutaneous HS RODRIGUE Ivy      metoprolol succinate  50 mg Oral Daily Devang Ingram MD      montelukast  10 mg Oral HS Devang Ingram MD      ondansetron  4 mg Intravenous Q6H PRN Devang Ingram MD      potassium chloride  40 mEq Oral BID RODRIGUE Rodriges      pregabalin  50 mg Oral HS Devang Ingram MD      spironolactone  25 mg Oral Daily Devang Ingram MD      umeclidinium  1 puff Inhalation Daily Devang Ingram MD          Today, Patient Was Seen By: Jackie Bradshaw PA-C    **Please Note: This note may have been constructed using a voice recognition system.**

## 2024-08-01 NOTE — ASSESSMENT & PLAN NOTE
POA, since resolved, due to acute CHF evidenced by increased WOB, SOB and tachypnea requiring Bipap and HFNC in the ED  Now saturating well on RA, continue IV diuresis per HF team

## 2024-08-01 NOTE — PROGRESS NOTES
Heart Failure/ Pulmonary Hypertension Progress Note - Mesfin Cano 59 y.o. male MRN: [id number]    Unit/Bed#: [facility] 508-01 Encounter: [id number]      Assessment:    Principal Problem:    Acute on chronic diastolic congestive heart failure (HCC)  Active Problems:    Hyperlipidemia    Morbid obesity (HCC)    VICKY (obstructive sleep apnea)    Paroxysmal atrial fibrillation (HCC)    CKD (chronic kidney disease) stage 3 (HCC)    Diabetic polyneuropathy associated with type 2 diabetes mellitus (HCC)    Moderate persistent asthma without complication    Anemia      Subjective:   59 year old with PMH as above who presented to the ED today with complaints of worsening SOB, leg swelling and abdominal distension. CardioMems readings above goal since 7/21. He is already on high dose diuretics along with MRA and SGLT2i. He reports no significant dietary indiscretions. Has been using Metolazone more frequently but with inadequate response. Of note, recently admitted on  6/18/2024 due to 1 week history of chest pain and shortness of breath.  In the ER he was noted to have uncontrolled A-fib/flutter.  Underwent cardioversion after which symptoms resolved.  Patient continued with chest pain and stress test was performed which was abnormal as above, however general cardiology team felt results were similar compared to prior and recommended outpatient CTA.  Was euvolemic while admitted     Patient seen and examined.  No significant events overnight. Feels better.      Objective:   Intake/ Output: 1438/5375/-3.9 L  Weight: 324 lbs today, 330 yesterday  Tele: not on tele  MAPs: 90s    Acute on Chronic HFpEF, Stage C, NYHA III.    -PAD above goal since end June in the low 20's  -current exacerbation with unclear trigger, possibly dietary or pulmonary component, ?diuretic resistance.  -Robust U.O with continuous Bumex infusion. Replace K. Continue.   -Check q12 BMPs.  -Advised he bring in SocruiseMems pillow. Will send transmission once he  achieves clinical euvolemia.      Volume management-home :  Bumex 6 mg PO BID, Jardiance 10 mg daily, Spironolactone 25 mg daily, Metolazone 2.5 mg PRN     Cardiomems Data: PAD Goal- 15 mmhg     7/21 - 20.  7/22 - 25.  7/23 - 21.  7/24 - 20.  7/25 - 18.  7/26 - 22.       7/27 - No Reading  7/28 - 20.  7/29 - 21.  7/30 - 18        NPI 6/25/24:  There is a left ventricular perfusion defect that is medium in size with moderate reduction in uptake present in the entire inferior and inferolateral location(s) that is partially reversible. The possibility of this defect being caused by diaphragmatic attenuation artifact cannot be completely excluded.      Echo 6/20/24 EF 55%, akinetic basal inferolateral and mid inferolateral      RHC with Cardiomems calibration:  CardioMEMS rep Maciel Shabazz was present throughout the case and provided the simultaneous MEMS interrogation data.     -140 during case  HR 74  O2 sat 97%  Hgb 10.5     RA 8  RV 30/8  PA 28/12, 17  PCWP 10-12 (multiple checks)  TPG 5  PA sat 59% (multiple checks)  Travis CO/CI: 6.6/2.3  TD CO/CI: 7.43/2.78     Simultaneous CardioMEMS data:  Mean: 26/10/15  End expiration on MEMS waveform: 28/10      Impression/Recommendations:  The patient tolerated the procedure well; no immediate complications.  Normal biv filling pressures (after inpt diuresis).  Normal CO/CI.  CardioMEMS data correlates to RHC.  s/p CardioMems implant 3/9/22, GOAL PAD 15-18  PAD 16 10/6, 14 10/5.      BNP 10/6/23: 93  122  233      Studies  LHC 9/6/22: No obstructive CAD  Pharm Nuc Stress 9/2/22: Abnormal study due to the presence of an inferior and inferolateral infarct without significant ischemia.  RHC 3/9/22:   RA 4   RV 35/4   PA 35/12/21   PCWP 10   PA sat 64%   Travis CO 5.56 L/min   Travis CI 2.2L/min/m2   PVR 1.97 SAGASTUME   TTE 4/11/2023: LVEF 50%.  Normal wall motion.  Grade 2 DD.  RV cavity size and systolic function normal.  LA mildly dilated.  Trace MR, TR.  Normal IVC.  TTE  "03/25/2020: LVEF 40-45%. LVIDd 6.12 cm. Normal RV.    TTE 07/19/2021: LVEF 50%. LVIDd 6.13 cm. Grade 1 DD. Normal RV. Trace MR and TR. Mildly dilated IVC.    TTE 11/12/2021: LVEF 55%. LVIDd 6.0 cm. Grade 1 DD. Normal RV. Trace TR.       Syncope with fall  -Had recent  isolated episode at home  -unconscious for a few seconds per patient report  -possibly 2/2 orthostatic hypotension ?  -Zio patch was placed but fell off     Atrial fibrillation/flutter with RVR   Currently maintaining SR  [id number] = 3 (HF, HTN, DM).   Anticoagulation on Eliquis.    Rate control: Metop succinate 50 mg daily   Rhythm control:S/P DCCV     Hypertension   Hyperlipidemia         Lab Results   Component Value Date     LDLCALC 86 10/06/2023    Rx: atorvastatin 10 mg daily.      Diabetes mellitus, type II     Non-insulin dependent.             Lab Results   Component Value Date     HGBA1C 8.3 (H) 06/19/2024      Obstructive sleep apnea  Not currently using CPAP given machine recall. Needs repeat sleep study  Chronic respiratory failure   Asthma, moderate persistent    S/p gastric bypass (Samuel-en-Y)surgery    History of tobacco abuse   Carpal tunnel syndrome, bilateral    CKD, recent baseline 1.5-2.0. Stable.   Eosinophilia/anemia. Unclear etiology. For BMB in upcoming weeks. Follows with hem/onc.     Review of Systems   All other systems reviewed and are negative.       Central Line (day, reason):  Cotton catheter (day, reason):    Vitals: Blood pressure 128/75, pulse 70, temperature 98.2 °F (36.8 °C), temperature source Oral, resp. rate 20, height 6' 1\" (1.854 m), weight (!) 147 kg (324 lb 1.2 oz), SpO2 96%., Body mass index is 42.76 kg/m²., I/O last 3 completed shifts:  In: 1438.7 [P.O.:1380; I.V.:58.7]  Out: 6175 [Urine:6175]  I/O this shift:  In: 120 [P.O.:120]  Out: -   Wt Readings from Last 3 Encounters:   08/01/24 (!) 147 kg (324 lb 1.2 oz)   07/19/24 (!) 150 kg (330 lb)   06/26/24 (!) 149 kg (328 lb 3.2 oz)       Intake/Output Summary " (Last 24 hours) at 8/1/2024 0824  Last data filed at 8/1/2024 0719  Gross per 24 hour   Intake 1558.74 ml   Output 5375 ml   Net -3816.26 ml     I/O last 3 completed shifts:  In: 1438.7 [P.O.:1380; I.V.:58.7]  Out: 6175 [Urine:6175]      Physical Exam:  Vitals:    07/31/24 1940 07/31/24 2234 08/01/24 0433 08/01/24 0719   BP:  124/68  128/75   BP Location:  Right arm  Left arm   Pulse:  80  70   Resp:  16  20   Temp:  99.4 °F (37.4 °C)  98.2 °F (36.8 °C)   TempSrc:  Oral  Oral   SpO2: 96% 96%  96%   Weight:   (!) 147 kg (324 lb 1.2 oz)    Height:           GEN: Mesfin Cano appears well, alert and oriented x 3, pleasant and cooperative   HEENT: pupils equal, round, and reactive to light; extraocular muscles intact  NECK: supple, no carotid bruits   HEART: regular rhythm, normal S1 and S2, no murmurs, clicks, gallops or rubs, JVP is  elevated.   LUNGS: clear to auscultation bilaterally; no wheezes, rales, or rhonchi   ABDOMEN: normal bowel sounds, soft, no tenderness, no distention  EXTREMITIES: peripheral pulses normal; no clubbing, cyanosis, trace BLLE edema  NEURO: no focal findings   SKIN: normal without suspicious lesions on exposed skin      Current Facility-Administered Medications:     acetaminophen (TYLENOL) tablet 650 mg, 650 mg, Oral, Q6H PRN, Devang Ingram MD    albuterol (PROVENTIL HFA,VENTOLIN HFA) inhaler 2 puff, 2 puff, Inhalation, Q4H PRN, Devang Ingram MD    apixaban (ELIQUIS) tablet 5 mg, 5 mg, Oral, BID, Devang Ingram MD, 5 mg at 07/31/24 1750    atorvastatin (LIPITOR) tablet 10 mg, 10 mg, Oral, Daily, Devang Ingram MD, 10 mg at 07/31/24 0930    budesonide-formoterol (SYMBICORT) 160-4.5 mcg/act inhaler 2 puff, 2 puff, Inhalation, BID, Devang Ingram MD, 2 puff at 07/31/24 1750    bumetanide (BUMEX) 12.5 mg infusion 50 mL, 1 mg/hr, Intravenous, Continuous, RODRIGUE Rodriges, Last Rate: 4 mL/hr at 08/01/24 0637, 1 mg/hr at 08/01/24 0637    Empagliflozin (JARDIANCE) tablet 10 mg, 10 mg, Oral, Daily,  Dveang Ingram MD, 10 mg at 07/31/24 0930    fluticasone (FLONASE) 50 mcg/act nasal spray 1 spray, 1 spray, Nasal, BID, Devang Ingram MD    insulin lispro (HumALOG/ADMELOG) 100 units/mL subcutaneous injection 2-12 Units, 2-12 Units, Subcutaneous, TID AC, 2 Units at 08/01/24 0634 **AND** Fingerstick Glucose (POCT), , , TID AC, Alicia Craven, RODRIGUE    insulin lispro (HumALOG/ADMELOG) 100 units/mL subcutaneous injection 2-12 Units, 2-12 Units, Subcutaneous, HS, RODRIGUE Ivy, 2 Units at 07/31/24 2125    metoprolol succinate (TOPROL-XL) 24 hr tablet 50 mg, 50 mg, Oral, Daily, Devang Ingram MD, 50 mg at 07/31/24 0930    montelukast (SINGULAIR) tablet 10 mg, 10 mg, Oral, HS, Devang Ingram MD, 10 mg at 07/31/24 2124    ondansetron (ZOFRAN) injection 4 mg, 4 mg, Intravenous, Q6H PRN, Devang Ingram MD    pregabalin (LYRICA) capsule 50 mg, 50 mg, Oral, HS, Devang Ingram MD, 50 mg at 07/31/24 2124    spironolactone (ALDACTONE) tablet 25 mg, 25 mg, Oral, Daily, Devang Ingram MD, 25 mg at 07/31/24 0930    umeclidinium 62.5 mcg/actuation inhaler AEPB 1 puff, 1 puff, Inhalation, Daily, Devang Ingram MD, 1 puff at 07/31/24 0929      Labs & Results:        Results from last 7 days   Lab Units 08/01/24 0427 07/31/24  0612 07/30/24  1355   WBC Thousand/uL 6.52 6.86 8.20   HEMOGLOBIN g/dL 10.2* 8.9* 10.1*   HEMATOCRIT % 34.7* 29.4* 33.3*   PLATELETS Thousands/uL 257 228 257         Results from last 7 days   Lab Units 08/01/24 0427 07/31/24  1941 07/31/24  0612 07/30/24  1355   POTASSIUM mmol/L 3.4* 4.6 4.2 4.1   CHLORIDE mmol/L 96 97 101 104   CO2 mmol/L 25 26 27 20*   BUN mg/dL 28* 29* 27* 24   CREATININE mg/dL 1.71* 1.67* 1.40* 1.28   CALCIUM mg/dL 8.3* 8.7 8.2* 8.1*   ALK PHOS U/L  --   --   --  95   ALT U/L  --   --   --  6*   AST U/L  --   --   --  9*           Counseling / Coordination of Care    Thank you for the opportunity to participate in the care of this patient.    Capri HUSAIN

## 2024-08-01 NOTE — CASE MANAGEMENT
Case Management Assessment & Discharge Planning Note    Patient name Mesfin Cano  Location [facility]-01 MRN [id number]  : 1965 Date 2024       Current Admission Date: 2024  Current Admission Diagnosis:Acute on chronic diastolic congestive heart failure (HCC)   Patient Active Problem List    Diagnosis Date Noted Date Diagnosed    Anemia 2024     Bilateral leg edema 2024     Abnormal stress test 2024     Chest pain 2024     Uncontrolled atrial flutter (HCC) 2024     Moderate persistent asthma without complication 2024     Status post cholecystectomy 2024     Diabetic neuropathy (HCC) 2023     Eosinophilia 10/18/2023     Anemia due to chronic kidney disease 10/16/2023     Nasal congestion 10/06/2023     Loose stools 2023     Acute on chronic diastolic congestive heart failure (HCC) 04/10/2023     Diabetic polyneuropathy associated with type 2 diabetes mellitus (HCC) 2022     CKD (chronic kidney disease) stage 3 (HCC) 2022     Presence of CardioMEMS HF system 2022     Paroxysmal atrial fibrillation (HCC) 2022     Hypokalemia 2021     HNP (herniated nucleus pulposus), lumbar 2021     Foraminal stenosis of lumbar region 2021     VICKY (obstructive sleep apnea)      Hyperlipidemia 2019     Primary osteoarthritis of both knees 2018     Irritable bowel syndrome with diarrhea 2018     Primary hypertension 2018     Vitamin B12 deficiency 2016     Vitamin D deficiency 2016     Morbid obesity (HCC) 2016     Primary osteoarthritis of right knee 10/15/2013       LOS (days): 2  Geometric Mean LOS (GMLOS) (days): 3.9  Days to GMLOS:2.2     OBJECTIVE:    Risk of Unplanned Readmission Score: 42.72         Current admission status: Inpatient       Preferred Pharmacy:   [id number] - [place], [place] - [address] [place] [address]  [place] [address]  Phone:  [phone] Fax: [phone]    Primary Care Provider: Soo Chavez MD    Primary Insurance: BLUE CROSS  Secondary Insurance:     ASSESSMENT:  Active Health Care Proxies       Karina Cano Health Care Representative - Spouse   Primary Phone: [phone] (Home)  Mobile Phone: [phone]                 Advance Directives  Does patient have a Health Care POA?: No  Was patient offered paperwork?: Yes (declined)  Does patient currently have a Health Care decision maker?: Yes, please see Health Care Proxy section  Does patient have Advance Directives?: No  Was patient offered paperwork?: Yes (declined)  Primary Contact: wife, Karina Cano         Readmission Root Cause  30 Day Readmission: No    Patient Information  Admitted from:: Home  Mental Status: Alert  During Assessment patient was accompanied by: Not accompanied during assessment  Assessment information provided by:: Patient  Primary Caregiver: Self  Support Systems: Family members, Spouse/significant other, Self  What city do you live in?: [place]  Home entry access options. Select all that apply.: Stairs  Number of steps to enter home.: 6  Do the steps have railings?: Yes  Type of Current Residence: [address] [place] home  Floor Level: 6  Upon entering residence, is there a bedroom on the main floor (no further steps)?: Yes (pt sleeps on couch)  Upon entering residence, is there a bathroom on the main floor (no further steps)?: No  Indicate which floors of current residence have a bathroom (select all the apply):: 2nd Floor  Number of steps to 2nd floor from main floor:  (16 steps)  Living Arrangements: Lives w/ Spouse/significant other  Is patient a ?: No    Activities of Daily Living Prior to Admission  Functional Status: Independent  Completes ADLs independently?: Yes  Ambulates independently?: Yes  Does patient use assisted devices?: Yes  Assisted Devices (DME) used: Straight Cane  Does patient have a history of Outpatient Therapy (PT/OT)?:  No  Does the patient have a history of Short-Term Rehab?: No  Does patient have a history of HHC?: No  Does patient currently have HHC?: No         Patient Information Continued  Income Source: Other (Comment) (applying for disability)  Does patient have prescription coverage?: Yes  Does patient receive dialysis treatments?: No  Does patient have a history of substance abuse?: No  Does patient have a history of Mental Health Diagnosis?: No         Means of Transportation  Means of Transport to Appts:: Drives Self      Social Determinants of Health (SDOH)      Flowsheet Row Most Recent Value   Housing Stability    In the last 12 months, was there a time when you were not able to pay the mortgage or rent on time? N  [has concerns. Provided resource w/ scanned QR code from [facility] find help]   In the past 12 months, how many times have you moved where you were living? 0   At any time in the past 12 months, were you homeless or living in a shelter (including now)? N   Transportation Needs    In the past 12 months, has lack of transportation kept you from medical appointments or from getting medications? no   In the past 12 months, has lack of transportation kept you from meetings, work, or from getting things needed for daily living? No   Food Insecurity    Within the past 12 months, you worried that your food would run out before you got the money to buy more. Sometimes   Within the past 12 months, the food you bought just didn't last and you didn't have money to get more. Never true   Utilities    In the past 12 months has the electric, gas, oil, or water company threatened to shut off services in your home? No            DISCHARGE DETAILS:    Discharge planning discussed with:: patient at bedside        CM contacted family/caregiver?: No- see comments (declined)             Contacts  Patient Contacts: patient/self  Contact Method: In Person  Reason/Outcome: Emergency Contact, Continuity of Care, Discharge Planning                    Would you like to participate in our [facility] service program?  : No - Declined          Additional Comments: CM met with patient to introduce CM role/team, obtain baseline assessment information and discuss DCP. Pt lives w/ wife in house w/ ^MADYSON. Sleeps on couch. 16 steps to bathroom. IPTA. ambulates w/ cane. Drives. Applied for disability-pnd. No prior HC or rehab. Denies h/o MH or SA treatment. CM will follow for DC needs.

## 2024-08-02 PROBLEM — J96.01 ACUTE RESPIRATORY FAILURE WITH HYPOXIA (HCC): Status: RESOLVED | Noted: 2024-08-01 | Resolved: 2024-08-02

## 2024-08-02 LAB
ANION GAP SERPL CALCULATED.3IONS-SCNC: 12 MMOL/L (ref 4–13)
ANION GAP SERPL CALCULATED.3IONS-SCNC: 13 MMOL/L (ref 4–13)
BUN SERPL-MCNC: 30 MG/DL (ref 5–25)
BUN SERPL-MCNC: 37 MG/DL (ref 5–25)
CALCIUM SERPL-MCNC: 8.5 MG/DL (ref 8.4–10.2)
CALCIUM SERPL-MCNC: 8.9 MG/DL (ref 8.4–10.2)
CHLORIDE SERPL-SCNC: 91 MMOL/L (ref 96–108)
CHLORIDE SERPL-SCNC: 93 MMOL/L (ref 96–108)
CO2 SERPL-SCNC: 31 MMOL/L (ref 21–32)
CO2 SERPL-SCNC: 33 MMOL/L (ref 21–32)
CREAT SERPL-MCNC: 1.88 MG/DL (ref 0.6–1.3)
CREAT SERPL-MCNC: 2.13 MG/DL (ref 0.6–1.3)
ERYTHROCYTE [DISTWIDTH] IN BLOOD BY AUTOMATED COUNT: 20.6 % (ref 11.6–15.1)
GFR SERPL CREATININE-BSD FRML MDRD: 33 ML/MIN/1.73SQ M
GFR SERPL CREATININE-BSD FRML MDRD: 38 ML/MIN/1.73SQ M
GLUCOSE SERPL-MCNC: 148 MG/DL (ref 65–140)
GLUCOSE SERPL-MCNC: 152 MG/DL (ref 65–140)
GLUCOSE SERPL-MCNC: 169 MG/DL (ref 65–140)
GLUCOSE SERPL-MCNC: 195 MG/DL (ref 65–140)
GLUCOSE SERPL-MCNC: 201 MG/DL (ref 65–140)
GLUCOSE SERPL-MCNC: 205 MG/DL (ref 65–140)
HCT VFR BLD AUTO: 35.8 % (ref 36.5–49.3)
HGB BLD-MCNC: 10.9 G/DL (ref 12–17)
MCH RBC QN AUTO: 25.2 PG (ref 26.8–34.3)
MCHC RBC AUTO-ENTMCNC: 30.4 G/DL (ref 31.4–37.4)
MCV RBC AUTO: 83 FL (ref 82–98)
PLATELET # BLD AUTO: 279 THOUSANDS/UL (ref 149–390)
PMV BLD AUTO: 10 FL (ref 8.9–12.7)
POTASSIUM SERPL-SCNC: 3.6 MMOL/L (ref 3.5–5.3)
POTASSIUM SERPL-SCNC: 3.9 MMOL/L (ref 3.5–5.3)
RBC # BLD AUTO: 4.32 MILLION/UL (ref 3.88–5.62)
SODIUM SERPL-SCNC: 136 MMOL/L (ref 135–147)
SODIUM SERPL-SCNC: 137 MMOL/L (ref 135–147)
WBC # BLD AUTO: 7.15 THOUSAND/UL (ref 4.31–10.16)

## 2024-08-02 PROCEDURE — 85027 COMPLETE CBC AUTOMATED: CPT | Performed by: PHYSICIAN ASSISTANT

## 2024-08-02 PROCEDURE — 80048 BASIC METABOLIC PNL TOTAL CA: CPT | Performed by: NURSE PRACTITIONER

## 2024-08-02 PROCEDURE — 99232 SBSQ HOSP IP/OBS MODERATE 35: CPT | Performed by: INTERNAL MEDICINE

## 2024-08-02 PROCEDURE — 99232 SBSQ HOSP IP/OBS MODERATE 35: CPT | Performed by: PHYSICIAN ASSISTANT

## 2024-08-02 PROCEDURE — 82948 REAGENT STRIP/BLOOD GLUCOSE: CPT

## 2024-08-02 RX ORDER — POTASSIUM CHLORIDE 20 MEQ/1
40 TABLET, EXTENDED RELEASE ORAL 2 TIMES DAILY
Status: DISCONTINUED | OUTPATIENT
Start: 2024-08-02 | End: 2024-08-03

## 2024-08-02 RX ADMIN — Medication 1 MG/HR: at 15:25

## 2024-08-02 RX ADMIN — BUDESONIDE AND FORMOTEROL FUMARATE DIHYDRATE 2 PUFF: 160; 4.5 AEROSOL RESPIRATORY (INHALATION) at 17:13

## 2024-08-02 RX ADMIN — PREGABALIN 50 MG: 50 CAPSULE ORAL at 21:40

## 2024-08-02 RX ADMIN — UMECLIDINIUM 1 PUFF: 62.5 AEROSOL, POWDER ORAL at 08:47

## 2024-08-02 RX ADMIN — ATORVASTATIN CALCIUM 10 MG: 10 TABLET, FILM COATED ORAL at 08:44

## 2024-08-02 RX ADMIN — METOPROLOL SUCCINATE 50 MG: 50 TABLET, EXTENDED RELEASE ORAL at 08:44

## 2024-08-02 RX ADMIN — BUDESONIDE AND FORMOTEROL FUMARATE DIHYDRATE 2 PUFF: 160; 4.5 AEROSOL RESPIRATORY (INHALATION) at 08:47

## 2024-08-02 RX ADMIN — INSULIN LISPRO 4 UNITS: 100 INJECTION, SOLUTION INTRAVENOUS; SUBCUTANEOUS at 21:40

## 2024-08-02 RX ADMIN — APIXABAN 5 MG: 5 TABLET, FILM COATED ORAL at 08:44

## 2024-08-02 RX ADMIN — INSULIN LISPRO 2 UNITS: 100 INJECTION, SOLUTION INTRAVENOUS; SUBCUTANEOUS at 17:11

## 2024-08-02 RX ADMIN — APIXABAN 5 MG: 5 TABLET, FILM COATED ORAL at 17:11

## 2024-08-02 RX ADMIN — MONTELUKAST 10 MG: 10 TABLET, FILM COATED ORAL at 21:40

## 2024-08-02 RX ADMIN — POTASSIUM CHLORIDE 40 MEQ: 1500 TABLET, EXTENDED RELEASE ORAL at 10:45

## 2024-08-02 RX ADMIN — EMPAGLIFLOZIN 10 MG: 10 TABLET, FILM COATED ORAL at 08:44

## 2024-08-02 RX ADMIN — POTASSIUM CHLORIDE 40 MEQ: 1500 TABLET, EXTENDED RELEASE ORAL at 17:11

## 2024-08-02 RX ADMIN — Medication 1 MG/HR: at 04:16

## 2024-08-02 RX ADMIN — SPIRONOLACTONE 25 MG: 25 TABLET ORAL at 08:44

## 2024-08-02 RX ADMIN — INSULIN LISPRO 4 UNITS: 100 INJECTION, SOLUTION INTRAVENOUS; SUBCUTANEOUS at 10:47

## 2024-08-02 NOTE — PROGRESS NOTES
[facility]  Progress Note  Name: Mesfin Cano I  MRN: [id number]  Unit/Bed#: [facility] 508-01 I Date of Admission: 7/30/2024   Date of Service: 8/2/2024 I Hospital Day: 3    Assessment & Plan   Anemia  Assessment & Plan  Baseline hgb appears 7-8. Due to eosinophilia in 2023 underwent BMB in December.    Per review, morphology evaluation, flow cytometry, cytogenetics, FISH studies and molecular studies all together did not demonstrate any evidence of a primary hematologic malignancy. Specifically pathologist thought that the elevated eosinophil count was secondary/reactive to the patient's medical issues per hematologist note  Not on PO iron due to hx of Samuel-en-Y gastric bypass  Stable, 10.9 today       Moderate persistent asthma without complication  Assessment & Plan  Without acute exacerbation   Continue home inhalers Symbicort, umeclidinium, Singulair.    Albuterol as needed    Diabetic polyneuropathy associated with type 2 diabetes mellitus (HCC)  Assessment & Plan  Lab Results   Component Value Date    HGBA1C 8.3 (H) 06/19/2024     (P) [id number] Lyrica for chronic neuropathy   Hold home Januvia  Continue Jardiance here for HF  Continue SSI coverage while inpatient   Monitor accuchecks and adjust regimen PRN      CKD (chronic kidney disease) stage 3 (HCC)  Assessment & Plan  Lab Results   Component Value Date    EGFR 38 08/02/2024    EGFR 38 08/01/2024    EGFR 43 08/01/2024    CREATININE 1.88 (H) 08/02/2024    CREATININE 1.90 (H) 08/01/2024    CREATININE 1.71 (H) 08/01/2024   Baseline around 1.7 per chart review. Currently stable below baseline  Monitor renal function with diuresis  Avoid nephrotoxins/hypotension  BMP in AM      Paroxysmal atrial fibrillation (HCC)  Assessment & Plan  Anticoagulated with Eliquis 5 mg BID    Rate controlled with Toprol XL 50 mg daily     VICKY (obstructive sleep apnea)  Assessment & Plan  Currently not using CPAP at  bedtime  Encourage compliance     Morbid obesity (HCC)  Assessment & Plan  Affects all aspects of health, weight loss advised.   Prior hx of Samuel-en-Y gastric bypass noted   BMI of 42.76    Hyperlipidemia  Assessment & Plan  Continue Lipitor 10 mg daily     * Acute on chronic diastolic congestive heart failure (HCC)  Assessment & Plan  Pt presented to ED with complaint of worsening dyspnea, lower extremity edema, and orthopnea.  Patient denies excessive fluid intake or salt intake.  Patient has been compliant with his home Bumex 6 mg twice daily.  In ED, initially patient was requiring HFNC briefly, subsequently taken off currently on room air.  CXR WNL  Heart failure following,  Currently on IV Bumex gtt, continue   Continue spironolactone 25 mg daily   GDMT: Continue BB, Jardiance.   Recent echo 6/2024 showed EF 55%.  LA/RA dilated.   I/O, daily weights, Low NA diet with FR  Monitor volume status   Plan to transition to oral diuretics tomorrow    Acute respiratory failure with hypoxia (HCC)-resolved as of 8/2/2024  Assessment & Plan  POA, since resolved, due to acute CHF evidenced by increased WOB, SOB and tachypnea requiring Bipap and HFNC in the ED  Now saturating well on RA, continue IV diuresis per HF team                VTE Pharmacologic Prophylaxis:   Moderate Risk (Score 3-4) - Pharmacological DVT Prophylaxis Ordered: apixaban (Eliquis).    Mobility:   Basic Mobility Inpatient Raw Score: 24  JH-HLM Goal: 8: Walk 250 feet or more  JH-HLM Achieved: 6: Walk 10 steps or more  JH-HLM Goal NOT achieved. Continue with multidisciplinary rounding and encourage appropriate mobility to improve upon JH-HLM goals.    Patient Centered Rounds: I performed bedside rounds with nursing staff today.   Discussions with Specialists or Other Care Team Provider: Reviewed cardiology note    Education and Discussions with Family / Patient: Patient declined call to .     Total Time Spent on Date of Encounter in care of  patient: 45 mins. This time was spent on one or more of the following: performing physical exam; counseling and coordination of care; obtaining or reviewing history; documenting in the medical record; reviewing/ordering tests, medications or procedures; communicating with other healthcare professionals and discussing with patient's family/caregivers.    Current Length of Stay: 3 day(s)  Current Patient Status: Inpatient   Certification Statement: The patient will continue to require additional inpatient hospital stay due to HF exacerbation  Discharge Plan: Anticipate discharge in 24-48 hrs to home.    Code Status: Level 1 - Full Code    Subjective:   Pt reports improvement in breathing, leg swelling, and overall symptoms    Objective:     Vitals:   Temp (24hrs), Av.9 °F (36.6 °C), Min:97.6 °F (36.4 °C), Max:98.4 °F (36.9 °C)    Temp:  [97.6 °F (36.4 °C)-98.4 °F (36.9 °C)] 98.4 °F (36.9 °C)  HR:  [69-73] 73  Resp:  [16-18] 16  BP: (111-122)/(62-71) 119/64  SpO2:  [95 %-98 %] 98 %  Body mass index is 42.14 kg/m².     Input and Output Summary (last 24 hours):     Intake/Output Summary (Last 24 hours) at 2024 1338  Last data filed at 2024 1245  Gross per 24 hour   Intake 1379.13 ml   Output 4050 ml   Net -2670.87 ml       Physical Exam:   Physical Exam  Vitals and nursing note reviewed.   Constitutional:       Appearance: Normal appearance.   HENT:      Head: Normocephalic and atraumatic.      Mouth/Throat:      Mouth: Mucous membranes are moist.      Pharynx: Oropharynx is clear. No oropharyngeal exudate.   Eyes:      Extraocular Movements: Extraocular movements intact.   Cardiovascular:      Rate and Rhythm: Normal rate and regular rhythm.      Pulses: Normal pulses.      Heart sounds: Normal heart sounds. No murmur heard.     No friction rub. No gallop.   Pulmonary:      Effort: Pulmonary effort is normal. No respiratory distress.      Breath sounds: Normal breath sounds. No stridor. No wheezing or rales.    Abdominal:      General: Abdomen is flat. Bowel sounds are normal. There is no distension.      Palpations: Abdomen is soft.      Tenderness: There is no abdominal tenderness.   Musculoskeletal:      Right lower leg: Edema (mild) present.      Left lower leg: Edema (mild) present.   Skin:     General: Skin is warm and dry.   Neurological:      General: No focal deficit present.      Mental Status: He is alert and oriented to person, place, and time.          Additional Data:     Labs:  Results from last 7 days   Lab Units 08/02/24  0509 08/01/24  0427   WBC Thousand/uL 7.15 6.52   HEMOGLOBIN g/dL 10.9* 10.2*   HEMATOCRIT % 35.8* 34.7*   PLATELETS Thousands/uL 279 257   SEGS PCT %  --  66   LYMPHO PCT %  --  15   MONO PCT %  --  9   EOS PCT %  --  8*     Results from last 7 days   Lab Units 08/02/24  0509 07/31/24  0612 07/30/24  1355   SODIUM mmol/L 137   < > 135   POTASSIUM mmol/L 3.6   < > 4.1   CHLORIDE mmol/L 93*   < > 104   CO2 mmol/L 31   < > 20*   BUN mg/dL 30*   < > 24   CREATININE mg/dL 1.88*   < > 1.28   ANION GAP mmol/L 13   < > 11   CALCIUM mg/dL 8.5   < > 8.1*   ALBUMIN g/dL  --   --  3.6   TOTAL BILIRUBIN mg/dL  --   --  0.40   ALK PHOS U/L  --   --  95   ALT U/L  --   --  6*   AST U/L  --   --  9*   GLUCOSE RANDOM mg/dL 152*   < > 219*    < > = values in this interval not displayed.         Results from last 7 days   Lab Units 08/02/24  1043 08/02/24  0607 08/01/24  2031 08/01/24  1549 08/01/24  1039 08/01/24  0633 07/31/24  2037 07/31/24  1540 07/31/24  1208   POC GLUCOSE mg/dl 201* 148* 183* 138 206* 150* 165* 162* 261*               Lines/Drains:  Invasive Devices       Peripheral Intravenous Line  Duration             Peripheral IV 07/31/24 Right Antecubital 2 days                      Telemetry:  Telemetry Orders (From admission, onward)               24 Hour Telemetry Monitoring  Continuous x 24 Hours (Telem)        Question:  Reason for 24 Hour Telemetry  Answer:  Decompensated CHF- and any  one of the following: continuous diuretic infusion or total diuretic dose >200 mg daily, associated electrolyte derangement (I.e. K < 3.0), ionotropic drip (continuous infusion), hx of ventricular arrhythmia, or new EF < 35%                     Telemetry Reviewed: Normal Sinus Rhythm  Indication for Continued Telemetry Use: Acute CHF on >200 mg lasix/day or equivalent dose or with new reduced EF.              Imaging: No pertinent imaging reviewed.    Recent Cultures (last 7 days):         Last 24 Hours Medication List:   Current Facility-Administered Medications   Medication Dose Route Frequency Provider Last Rate    acetaminophen  650 mg Oral Q6H PRN Devang Ingram MD      albuterol  2 puff Inhalation Q4H PRN Devang Ingram MD      apixaban  5 mg Oral BID Devang Ingram MD      atorvastatin  10 mg Oral Daily Devang Ingram MD      budesonide-formoterol  2 puff Inhalation BID Devang Ingram MD      bumetanide (BUMEX) 12.5 mg infusion 50 mL  1 mg/hr Intravenous Continuous RODRIGUE Rodriges 1 mg/hr (08/02/24 0416)    Empagliflozin  10 mg Oral Daily Devang Ingram MD      fluticasone  1 spray Nasal BID Devang Ingram MD      insulin lispro  2-12 Units Subcutaneous TID AC RODRIGUE Ivy      insulin lispro  2-12 Units Subcutaneous HS RODRIGUE Ivy      metoprolol succinate  50 mg Oral Daily Devang Ingram MD      montelukast  10 mg Oral HS Devang Ingram MD      ondansetron  4 mg Intravenous Q6H PRN Devang Ingram MD      potassium chloride  40 mEq Oral BID RODRIGUE Rodriges      pregabalin  50 mg Oral HS Devang Ingram MD      spironolactone  25 mg Oral Daily Devang Ingram MD      umeclidinium  1 puff Inhalation Daily Devang Ingram MD          Today, Patient Was Seen By: Wm Nair PA-C    **Please Note: This note may have been constructed using a voice recognition system.**

## 2024-08-02 NOTE — ASSESSMENT & PLAN NOTE
Lab Results   Component Value Date    EGFR 38 08/02/2024    EGFR 38 08/01/2024    EGFR 43 08/01/2024    CREATININE 1.88 (H) 08/02/2024    CREATININE 1.90 (H) 08/01/2024    CREATININE 1.71 (H) 08/01/2024   Baseline around 1.7 per chart review. Currently stable below baseline  Monitor renal function with diuresis  Avoid nephrotoxins/hypotension  BMP in AM

## 2024-08-02 NOTE — ASSESSMENT & PLAN NOTE
Lab Results   Component Value Date    HGBA1C 8.3 (H) 06/19/2024     (P) [id number] Lyrica for chronic neuropathy   Hold home Januvia  Continue Jardiance here for HF  Continue SSI coverage while inpatient   Monitor accuchecks and adjust regimen PRN

## 2024-08-02 NOTE — ASSESSMENT & PLAN NOTE
Pt presented to ED with complaint of worsening dyspnea, lower extremity edema, and orthopnea.  Patient denies excessive fluid intake or salt intake.  Patient has been compliant with his home Bumex 6 mg twice daily.  In ED, initially patient was requiring HFNC briefly, subsequently taken off currently on room air.  CXR WNL  Heart failure following,  Currently on IV Bumex gtt, continue   Continue spironolactone 25 mg daily   GDMT: Continue BB, Jardiance.   Recent echo 6/2024 showed EF 55%.  LA/RA dilated.   I/O, daily weights, Low NA diet with FR  Monitor volume status   Plan to transition to oral diuretics tomorrow

## 2024-08-02 NOTE — PROGRESS NOTES
Heart Failure/ Pulmonary Hypertension Progress Note - Mesfin Cano 59 y.o. male MRN: [id number]    Unit/Bed#: [facility] 508-01 Encounter: [id number]      Assessment:    Principal Problem:    Acute on chronic diastolic congestive heart failure (HCC)  Active Problems:    Hyperlipidemia    Morbid obesity (HCC)    VICKY (obstructive sleep apnea)    Paroxysmal atrial fibrillation (HCC)    CKD (chronic kidney disease) stage 3 (HCC)    Diabetic polyneuropathy associated with type 2 diabetes mellitus (HCC)    Moderate persistent asthma without complication    Anemia    Acute respiratory failure with hypoxia (HCC)      Subjective:   59 year old with PMH as above who presented to the ED today with complaints of worsening SOB, leg swelling and abdominal distension. CardioMems readings above goal since 7/21. He is already on high dose diuretics along with MRA and SGLT2i. He reports no significant dietary indiscretions. Has been using Metolazone more frequently but with inadequate response. Of note, recently admitted on  6/18/2024 due to 1 week history of chest pain and shortness of breath.  In the ER he was noted to have uncontrolled A-fib/flutter.  Underwent cardioversion after which symptoms resolved.  Patient continued with chest pain and stress test was performed which was abnormal as above, however general cardiology team felt results were similar compared to prior and recommended outpatient CTA.  Was euvolemic while admitted     Patient seen and examined.  No significant events overnight. Feels better.      Objective:   Intake/ Output: 1535/4550/-3L  Weight: 319 lbs today, 330 on admit.   Tele:   MAPs: 80s    Acute on Chronic HFpEF, Stage C, NYHA III.    -PAD above goal since end June in the low 20's  -current exacerbation with unclear trigger, possibly dietary or pulmonary component, ?diuretic resistance.  -Continues with robust U.O with continuous Bumex infusion. Replace K. Continue.   -Check q12 BMPs.  -Advised he bring  in CardiMems pillow. Will send transmission once he achieves clinical euvolemia.      Volume management-home :  Bumex 6 mg PO BID, Jardiance 10 mg daily, Spironolactone 25 mg daily, Metolazone 2.5 mg PRN     Cardiomems Data: PAD Goal- 15 mmhg     7/21 - 20.  7/22 - 25.  7/23 - 21.  7/24 - 20.  7/25 - 18.  7/26 - 22.       7/27 - No Reading  7/28 - 20.  7/29 - 21.  7/30 - 18        NPI 6/25/24:  There is a left ventricular perfusion defect that is medium in size with moderate reduction in uptake present in the entire inferior and inferolateral location(s) that is partially reversible. The possibility of this defect being caused by diaphragmatic attenuation artifact cannot be completely excluded.      Echo 6/20/24 EF 55%, akinetic basal inferolateral and mid inferolateral      RHC with Cardiomems calibration:  CardioMEMS rep Maciel Shabazz was present throughout the case and provided the simultaneous MEMS interrogation data.     -140 during case  HR 74  O2 sat 97%  Hgb 10.5     RA 8  RV 30/8  PA 28/12, 17  PCWP 10-12 (multiple checks)  TPG 5  PA sat 59% (multiple checks)  Travis CO/CI: 6.6/2.3  TD CO/CI: 7.43/2.78     Simultaneous CardioMEMS data:  Mean: 26/10/15  End expiration on MEMS waveform: 28/10      Impression/Recommendations:  The patient tolerated the procedure well; no immediate complications.  Normal biv filling pressures (after inpt diuresis).  Normal CO/CI.  CardioMEMS data correlates to RHC.  s/p CardioMems implant 3/9/22, GOAL PAD 15-18  PAD 16 10/6, 14 10/5.      BNP 10/6/23: 93  122  233      Studies  LHC 9/6/22: No obstructive CAD  Pharm Nuc Stress 9/2/22: Abnormal study due to the presence of an inferior and inferolateral infarct without significant ischemia.  RHC 3/9/22:   RA 4   RV 35/4   PA 35/12/21   PCWP 10   PA sat 64%   Travis CO 5.56 L/min   Travis CI 2.2L/min/m2   PVR 1.97 SAGASTUME   TTE 4/11/2023: LVEF 50%.  Normal wall motion.  Grade 2 DD.  RV cavity size and systolic function normal.  LA  "mildly dilated.  Trace MR, TR.  Normal IVC.  TTE 03/25/2020: LVEF 40-45%. LVIDd 6.12 cm. Normal RV.    TTE 07/19/2021: LVEF 50%. LVIDd 6.13 cm. Grade 1 DD. Normal RV. Trace MR and TR. Mildly dilated IVC.    TTE 11/12/2021: LVEF 55%. LVIDd 6.0 cm. Grade 1 DD. Normal RV. Trace TR.       Syncope with fall  -Had recent  isolated episode at home  -unconscious for a few seconds per patient report  -possibly 2/2 orthostatic hypotension ?  -Zio patch was placed but fell off     Atrial fibrillation/flutter with RVR   Currently maintaining SR  [id number] = 3 (HF, HTN, DM).   Anticoagulation on Eliquis.    Rate control: Metop succinate 50 mg daily   Rhythm control:S/P DCCV     Hypertension   Hyperlipidemia         Lab Results   Component Value Date     LDLCALC 86 10/06/2023    Rx: atorvastatin 10 mg daily.      Diabetes mellitus, type II     Non-insulin dependent.             Lab Results   Component Value Date     HGBA1C 8.3 (H) 06/19/2024      Obstructive sleep apnea  Not currently using CPAP given machine recall. Needs repeat sleep study  Chronic respiratory failure   Asthma, moderate persistent    S/p gastric bypass (Samuel-en-Y)surgery    History of tobacco abuse   Carpal tunnel syndrome, bilateral    CKD, recent baseline 1.5-2.0. Stable.   Eosinophilia/anemia. Unclear etiology. For BMB in upcoming weeks. Follows with hem/onc.     Review of Systems   All other systems reviewed and are negative.       Central Line (day, reason):  Cotton catheter (day, reason):    Vitals: Blood pressure 119/64, pulse 73, temperature 98.4 °F (36.9 °C), temperature source Oral, resp. rate 16, height 6' 1\" (1.854 m), weight (!) 145 kg (319 lb 6.4 oz), SpO2 98%., Body mass index is 42.14 kg/m²., I/O last 3 completed shifts:  In: 2297.7 [P.O.:2160; I.V.:137.7]  Out: 7700 [Urine:7700]  I/O this shift:  In: 200 [P.O.:200]  Out: -   Wt Readings from Last 3 Encounters:   08/02/24 (!) 145 kg (319 lb 6.4 oz)   07/19/24 (!) 150 kg (330 lb)   06/26/24 (!) " 149 kg (328 lb 3.2 oz)       Intake/Output Summary (Last 24 hours) at 8/2/2024 1210  Last data filed at 8/2/2024 0852  Gross per 24 hour   Intake 1105.2 ml   Output 3050 ml   Net -1944.8 ml     I/O last 3 completed shifts:  In: 2297.7 [P.O.:2160; I.V.:137.7]  Out: 7700 [Urine:7700]      Physical Exam:  Vitals:    08/02/24 0255 08/02/24 0300 08/02/24 0736 08/02/24 1108   BP: 111/66  122/62 119/64   BP Location: Left arm  Left arm Left arm   Pulse: 72  73 73   Resp: 18  17 16   Temp: 97.7 °F (36.5 °C)  97.9 °F (36.6 °C) 98.4 °F (36.9 °C)   TempSrc: Oral  Oral Oral   SpO2: 98%  97% 98%   Weight:  (!) 145 kg (319 lb 6.4 oz)     Height:           GEN: Mesfin Cano appears well, alert and oriented x 3, pleasant and cooperative   HEENT: pupils equal, round, and reactive to light; extraocular muscles intact  NECK: supple, no carotid bruits   HEART: regular rhythm, normal S1 and S2, no murmurs, clicks, gallops or rubs, JVP is  elevated.   LUNGS: clear to auscultation bilaterally; no wheezes, rales, or rhonchi   ABDOMEN: normal bowel sounds, soft, no tenderness, no distention  EXTREMITIES: peripheral pulses normal; no clubbing, cyanosis, trace BLLE edema  NEURO: no focal findings   SKIN: normal without suspicious lesions on exposed skin      Current Facility-Administered Medications:     acetaminophen (TYLENOL) tablet 650 mg, 650 mg, Oral, Q6H PRN, Devang Ingram MD    albuterol (PROVENTIL HFA,VENTOLIN HFA) inhaler 2 puff, 2 puff, Inhalation, Q4H PRN, Devang Ingram MD    apixaban (ELIQUIS) tablet 5 mg, 5 mg, Oral, BID, Devang Ingram MD, 5 mg at 08/02/24 0844    atorvastatin (LIPITOR) tablet 10 mg, 10 mg, Oral, Daily, Devang Ingram MD, 10 mg at 08/02/24 0844    budesonide-formoterol (SYMBICORT) 160-4.5 mcg/act inhaler 2 puff, 2 puff, Inhalation, BID, Devang Ingram MD, 2 puff at 08/02/24 0847    bumetanide (BUMEX) 12.5 mg infusion 50 mL, 1 mg/hr, Intravenous, Continuous, RODRIGUE Rodriges, Last Rate: 4 mL/hr at 08/02/24 1896,  1 mg/hr at 08/02/24 0416    Empagliflozin (JARDIANCE) tablet 10 mg, 10 mg, Oral, Daily, Devang Ingram MD, 10 mg at 08/02/24 0844    fluticasone (FLONASE) 50 mcg/act nasal spray 1 spray, 1 spray, Nasal, BID, Devang Ingram MD    insulin lispro (HumALOG/ADMELOG) 100 units/mL subcutaneous injection 2-12 Units, 2-12 Units, Subcutaneous, TID AC, 4 Units at 08/02/24 1047 **AND** Fingerstick Glucose (POCT), , , TID AC, Alicia Craven, CRNP    insulin lispro (HumALOG/ADMELOG) 100 units/mL subcutaneous injection 2-12 Units, 2-12 Units, Subcutaneous, HS, Alicia Craven, SAMNP, 2 Units at 08/01/24 2116    metoprolol succinate (TOPROL-XL) 24 hr tablet 50 mg, 50 mg, Oral, Daily, Devang Ingram MD, 50 mg at 08/02/24 0844    montelukast (SINGULAIR) tablet 10 mg, 10 mg, Oral, HS, Devang Ingram MD, 10 mg at 08/01/24 2114    ondansetron (ZOFRAN) injection 4 mg, 4 mg, Intravenous, Q6H PRN, Devang Ingram MD    potassium chloride (Klor-Con M20) CR tablet 40 mEq, 40 mEq, Oral, BID, Capri Bowen, CRNP, 40 mEq at 08/02/24 1045    pregabalin (LYRICA) capsule 50 mg, 50 mg, Oral, HS, Devang Ingram MD, 50 mg at 08/01/24 2114    spironolactone (ALDACTONE) tablet 25 mg, 25 mg, Oral, Daily, Devang Ingram MD, 25 mg at 08/02/24 0844    umeclidinium 62.5 mcg/actuation inhaler AEPB 1 puff, 1 puff, Inhalation, Daily, Devang Ingram MD, 1 puff at 08/02/24 0847      Labs & Results:        Results from last 7 days   Lab Units 08/02/24  0509 08/01/24  0427 07/31/24  0612   WBC Thousand/uL 7.15 6.52 6.86   HEMOGLOBIN g/dL 10.9* 10.2* 8.9*   HEMATOCRIT % 35.8* 34.7* 29.4*   PLATELETS Thousands/uL 279 257 228         Results from last 7 days   Lab Units 08/02/24  0509 08/01/24 2004 08/01/24  0427 07/31/24  0612 07/30/24  1355   POTASSIUM mmol/L 3.6 4.0 3.4*   < > 4.1   CHLORIDE mmol/L 93* 92* 96   < > 104   CO2 mmol/L 31 33* 25   < > 20*   BUN mg/dL 30* 31* 28*   < > 24   CREATININE mg/dL 1.88* 1.90* 1.71*   < > 1.28   CALCIUM mg/dL 8.5 8.8 8.3*   < > 8.1*   ALK  PHOS U/L  --   --   --   --  95   ALT U/L  --   --   --   --  6*   AST U/L  --   --   --   --  9*    < > = values in this interval not displayed.           Counseling / Coordination of Care    Thank you for the opportunity to participate in the care of this patient.    Capri HUSAIN

## 2024-08-02 NOTE — ASSESSMENT & PLAN NOTE
Baseline hgb appears 7-8. Due to eosinophilia in 2023 underwent BMB in December.    Per review, morphology evaluation, flow cytometry, cytogenetics, FISH studies and molecular studies all together did not demonstrate any evidence of a primary hematologic malignancy. Specifically pathologist thought that the elevated eosinophil count was secondary/reactive to the patient's medical issues per hematologist note  Not on PO iron due to hx of Samuel-en-Y gastric bypass  Stable, 10.9 today

## 2024-08-03 LAB
ANION GAP SERPL CALCULATED.3IONS-SCNC: 13 MMOL/L (ref 4–13)
BUN SERPL-MCNC: 38 MG/DL (ref 5–25)
CALCIUM SERPL-MCNC: 8.4 MG/DL (ref 8.4–10.2)
CHLORIDE SERPL-SCNC: 93 MMOL/L (ref 96–108)
CO2 SERPL-SCNC: 31 MMOL/L (ref 21–32)
CREAT SERPL-MCNC: 2.04 MG/DL (ref 0.6–1.3)
ERYTHROCYTE [DISTWIDTH] IN BLOOD BY AUTOMATED COUNT: 20.4 % (ref 11.6–15.1)
GFR SERPL CREATININE-BSD FRML MDRD: 35 ML/MIN/1.73SQ M
GLUCOSE SERPL-MCNC: 154 MG/DL (ref 65–140)
GLUCOSE SERPL-MCNC: 163 MG/DL (ref 65–140)
GLUCOSE SERPL-MCNC: 170 MG/DL (ref 65–140)
GLUCOSE SERPL-MCNC: 176 MG/DL (ref 65–140)
GLUCOSE SERPL-MCNC: 217 MG/DL (ref 65–140)
HCT VFR BLD AUTO: 36.4 % (ref 36.5–49.3)
HGB BLD-MCNC: 11 G/DL (ref 12–17)
MCH RBC QN AUTO: 25 PG (ref 26.8–34.3)
MCHC RBC AUTO-ENTMCNC: 30.2 G/DL (ref 31.4–37.4)
MCV RBC AUTO: 83 FL (ref 82–98)
PLATELET # BLD AUTO: 290 THOUSANDS/UL (ref 149–390)
PMV BLD AUTO: 10.5 FL (ref 8.9–12.7)
POTASSIUM SERPL-SCNC: 3.5 MMOL/L (ref 3.5–5.3)
RBC # BLD AUTO: 4.4 MILLION/UL (ref 3.88–5.62)
SODIUM SERPL-SCNC: 137 MMOL/L (ref 135–147)
WBC # BLD AUTO: 8.24 THOUSAND/UL (ref 4.31–10.16)

## 2024-08-03 PROCEDURE — 99232 SBSQ HOSP IP/OBS MODERATE 35: CPT | Performed by: NURSE PRACTITIONER

## 2024-08-03 PROCEDURE — 85027 COMPLETE CBC AUTOMATED: CPT | Performed by: PHYSICIAN ASSISTANT

## 2024-08-03 PROCEDURE — 82948 REAGENT STRIP/BLOOD GLUCOSE: CPT

## 2024-08-03 PROCEDURE — 80048 BASIC METABOLIC PNL TOTAL CA: CPT | Performed by: NURSE PRACTITIONER

## 2024-08-03 PROCEDURE — 99232 SBSQ HOSP IP/OBS MODERATE 35: CPT | Performed by: PHYSICIAN ASSISTANT

## 2024-08-03 RX ORDER — POTASSIUM CHLORIDE 20 MEQ/1
40 TABLET, EXTENDED RELEASE ORAL
Status: DISCONTINUED | OUTPATIENT
Start: 2024-08-03 | End: 2024-08-04

## 2024-08-03 RX ORDER — BUMETANIDE 2 MG/1
6 TABLET ORAL 3 TIMES DAILY
Status: DISCONTINUED | OUTPATIENT
Start: 2024-08-03 | End: 2024-08-04 | Stop reason: HOSPADM

## 2024-08-03 RX ADMIN — BUMETANIDE 6 MG: 2 TABLET ORAL at 17:17

## 2024-08-03 RX ADMIN — POTASSIUM CHLORIDE 40 MEQ: 1500 TABLET, EXTENDED RELEASE ORAL at 17:17

## 2024-08-03 RX ADMIN — BUDESONIDE AND FORMOTEROL FUMARATE DIHYDRATE 2 PUFF: 160; 4.5 AEROSOL RESPIRATORY (INHALATION) at 17:18

## 2024-08-03 RX ADMIN — INSULIN LISPRO 2 UNITS: 100 INJECTION, SOLUTION INTRAVENOUS; SUBCUTANEOUS at 08:18

## 2024-08-03 RX ADMIN — INSULIN LISPRO 4 UNITS: 100 INJECTION, SOLUTION INTRAVENOUS; SUBCUTANEOUS at 11:57

## 2024-08-03 RX ADMIN — PREGABALIN 50 MG: 50 CAPSULE ORAL at 21:00

## 2024-08-03 RX ADMIN — APIXABAN 5 MG: 5 TABLET, FILM COATED ORAL at 08:13

## 2024-08-03 RX ADMIN — BUDESONIDE AND FORMOTEROL FUMARATE DIHYDRATE 2 PUFF: 160; 4.5 AEROSOL RESPIRATORY (INHALATION) at 08:17

## 2024-08-03 RX ADMIN — APIXABAN 5 MG: 5 TABLET, FILM COATED ORAL at 17:17

## 2024-08-03 RX ADMIN — BUMETANIDE 6 MG: 2 TABLET ORAL at 21:00

## 2024-08-03 RX ADMIN — MONTELUKAST 10 MG: 10 TABLET, FILM COATED ORAL at 21:00

## 2024-08-03 RX ADMIN — INSULIN LISPRO 2 UNITS: 100 INJECTION, SOLUTION INTRAVENOUS; SUBCUTANEOUS at 17:19

## 2024-08-03 RX ADMIN — METOPROLOL SUCCINATE 50 MG: 50 TABLET, EXTENDED RELEASE ORAL at 08:13

## 2024-08-03 RX ADMIN — ATORVASTATIN CALCIUM 10 MG: 10 TABLET, FILM COATED ORAL at 08:13

## 2024-08-03 RX ADMIN — INSULIN LISPRO 2 UNITS: 100 INJECTION, SOLUTION INTRAVENOUS; SUBCUTANEOUS at 21:11

## 2024-08-03 RX ADMIN — SPIRONOLACTONE 25 MG: 25 TABLET ORAL at 08:13

## 2024-08-03 RX ADMIN — POTASSIUM CHLORIDE 40 MEQ: 1500 TABLET, EXTENDED RELEASE ORAL at 08:13

## 2024-08-03 RX ADMIN — EMPAGLIFLOZIN 10 MG: 10 TABLET, FILM COATED ORAL at 08:13

## 2024-08-03 RX ADMIN — UMECLIDINIUM 1 PUFF: 62.5 AEROSOL, POWDER ORAL at 08:17

## 2024-08-03 RX ADMIN — Medication 1 MG/HR: at 02:26

## 2024-08-03 RX ADMIN — POTASSIUM CHLORIDE 40 MEQ: 1500 TABLET, EXTENDED RELEASE ORAL at 11:56

## 2024-08-03 NOTE — PROGRESS NOTES
Heart Failure/ Pulmonary Hypertension Progress Note - Mesfin Cano 59 y.o. male MRN: [id number]    Unit/Bed#: [facility] 508-01 Encounter: [id number]      Assessment:    Principal Problem:    Acute on chronic diastolic congestive heart failure (HCC)  Active Problems:    Hyperlipidemia    Morbid obesity (HCC)    VICKY (obstructive sleep apnea)    Paroxysmal atrial fibrillation (HCC)    CKD (chronic kidney disease) stage 3 (HCC)    Diabetic polyneuropathy associated with type 2 diabetes mellitus (HCC)    Moderate persistent asthma without complication    Anemia      Subjective:   59 year old with PMH as above who presented to the ED today with complaints of worsening SOB, leg swelling and abdominal distension. CardioMems readings above goal since 7/21. He is already on high dose diuretics along with MRA and SGLT2i. He reports no significant dietary indiscretions. Has been using Metolazone more frequently but with inadequate response. Of note, recently admitted on  6/18/2024 due to 1 week history of chest pain and shortness of breath.  In the ER he was noted to have uncontrolled A-fib/flutter.  Underwent cardioversion after which symptoms resolved.  Patient continued with chest pain and stress test was performed which was abnormal as above, however general cardiology team felt results were similar compared to prior and recommended outpatient CTA.  Was euvolemic while admitted     Patient seen and examined.  No significant events overnight. Feels better.      Objective:   Intake/ Output: 1014/3750/-2.7  Weight: 319 lbs today, 330 on admit.   Tele: SR  MAPs: 80s    Acute on Chronic HFpEF, Stage C, NYHA III.    -PAD above goal since end June in the low 20's  -current exacerbation with unclear trigger, possibly dietary or pulmonary component, ?diuretic resistance.  -Diuresed well overnight. Now with creat up to 2. Will stop gtt. Start oral regimen Bumex 6 mg TID this PM  -Check BMP and urinary response  in AM  -Advised he bring  in CardiMems pillow. Will send transmission once he achieves clinical euvolemia.      Volume management-home :  Bumex 6 mg PO BID, Jardiance 10 mg daily, Spironolactone 25 mg daily, Metolazone 2.5 mg PRN     Cardiomems Data: PAD Goal- 15 mmhg     7/21 - 20.  7/22 - 25.  7/23 - 21.  7/24 - 20.  7/25 - 18.  7/26 - 22.       7/27 - No Reading  7/28 - 20.  7/29 - 21.  7/30 - 18        NPI 6/25/24:  There is a left ventricular perfusion defect that is medium in size with moderate reduction in uptake present in the entire inferior and inferolateral location(s) that is partially reversible. The possibility of this defect being caused by diaphragmatic attenuation artifact cannot be completely excluded.      Echo 6/20/24 EF 55%, akinetic basal inferolateral and mid inferolateral      RHC with Cardiomems calibration:  CardioMEMS rep Maciel Shabazz was present throughout the case and provided the simultaneous MEMS interrogation data.     -140 during case  HR 74  O2 sat 97%  Hgb 10.5     RA 8  RV 30/8  PA 28/12, 17  PCWP 10-12 (multiple checks)  TPG 5  PA sat 59% (multiple checks)  Travis CO/CI: 6.6/2.3  TD CO/CI: 7.43/2.78     Simultaneous CardioMEMS data:  Mean: 26/10/15  End expiration on MEMS waveform: 28/10      Impression/Recommendations:  The patient tolerated the procedure well; no immediate complications.  Normal biv filling pressures (after inpt diuresis).  Normal CO/CI.  CardioMEMS data correlates to RHC.  s/p CardioMems implant 3/9/22, GOAL PAD 15-18  PAD 16 10/6, 14 10/5.      BNP 10/6/23: 93  122  233      Studies  LHC 9/6/22: No obstructive CAD  Pharm Nuc Stress 9/2/22: Abnormal study due to the presence of an inferior and inferolateral infarct without significant ischemia.  RHC 3/9/22:   RA 4   RV 35/4   PA 35/12/21   PCWP 10   PA sat 64%   Travis CO 5.56 L/min   Travis CI 2.2L/min/m2   PVR 1.97 SAGASTUME   TTE 4/11/2023: LVEF 50%.  Normal wall motion.  Grade 2 DD.  RV cavity size and systolic function normal.  LA  "mildly dilated.  Trace MR, TR.  Normal IVC.  TTE 03/25/2020: LVEF 40-45%. LVIDd 6.12 cm. Normal RV.    TTE 07/19/2021: LVEF 50%. LVIDd 6.13 cm. Grade 1 DD. Normal RV. Trace MR and TR. Mildly dilated IVC.    TTE 11/12/2021: LVEF 55%. LVIDd 6.0 cm. Grade 1 DD. Normal RV. Trace TR.       Syncope with fall  -Had recent  isolated episode at home  -unconscious for a few seconds per patient report  -possibly 2/2 orthostatic hypotension ?  -Zio patch was placed but fell off     Atrial fibrillation/flutter with RVR   Currently maintaining SR  [id number] = 3 (HF, HTN, DM).   Anticoagulation on Eliquis.    Rate control: Metop succinate 50 mg daily   Rhythm control:S/P DCCV     Hypertension   Hyperlipidemia         Lab Results   Component Value Date     LDLCALC 86 10/06/2023    Rx: atorvastatin 10 mg daily.      Diabetes mellitus, type II     Non-insulin dependent.             Lab Results   Component Value Date     HGBA1C 8.3 (H) 06/19/2024      Obstructive sleep apnea  Not currently using CPAP given machine recall. Needs repeat sleep study  Chronic respiratory failure   Asthma, moderate persistent    S/p gastric bypass (Samuel-en-Y)surgery    History of tobacco abuse   Carpal tunnel syndrome, bilateral    CKD, recent baseline 1.5-2.0. Stable.   Eosinophilia/anemia. Unclear etiology. For BMB in upcoming weeks. Follows with hem/onc.     Review of Systems   All other systems reviewed and are negative.       Central Line (day, reason):  Cotton catheter (day, reason):    Vitals: Blood pressure 138/76, pulse 82, temperature 98.1 °F (36.7 °C), temperature source Oral, resp. rate 19, height 6' 1\" (1.854 m), weight (!) 145 kg (319 lb 12.8 oz), SpO2 93%., Body mass index is 42.19 kg/m²., I/O last 3 completed shifts:  In: 1560.1 [P.O.:1400; I.V.:160.1]  Out: 5700 [Urine:5700]  I/O this shift:  In: 240 [P.O.:240]  Out: -   Wt Readings from Last 3 Encounters:   08/03/24 (!) 145 kg (319 lb 12.8 oz)   07/19/24 (!) 150 kg (330 lb)   06/26/24 " (!) 149 kg (328 lb 3.2 oz)       Intake/Output Summary (Last 24 hours) at 8/3/2024 0846  Last data filed at 8/3/2024 0826  Gross per 24 hour   Intake 1237 ml   Output 2750 ml   Net -1513 ml     I/O last 3 completed shifts:  In: 1560.1 [P.O.:1400; I.V.:160.1]  Out: 5700 [Urine:5700]      Physical Exam:  Vitals:    08/02/24 2254 08/03/24 0224 08/03/24 0300 08/03/24 0738   BP: 107/61 107/60  138/76   BP Location: Left arm   Left arm   Pulse: 75 69  82   Resp: 16 18  19   Temp: (!) 97.4 °F (36.3 °C) 97.9 °F (36.6 °C)  98.1 °F (36.7 °C)   TempSrc: Oral   Oral   SpO2: 93% 95%  93%   Weight:   (!) 145 kg (319 lb 12.8 oz)    Height:           GEN: Mesfin Cano appears well, alert and oriented x 3, pleasant and cooperative   HEENT: pupils equal, round, and reactive to light; extraocular muscles intact  NECK: supple, no carotid bruits   HEART: regular rhythm, normal S1 and S2, no murmurs, clicks, gallops or rubs, JVP is down  LUNGS: clear to auscultation bilaterally; no wheezes, rales, or rhonchi   ABDOMEN: normal bowel sounds, soft, no tenderness, no distention  EXTREMITIES: peripheral pulses normal; no clubbing, cyanosis, trace BLLE edema  NEURO: no focal findings   SKIN: normal without suspicious lesions on exposed skin      Current Facility-Administered Medications:     acetaminophen (TYLENOL) tablet 650 mg, 650 mg, Oral, Q6H PRN, Devang Ingram MD    albuterol (PROVENTIL HFA,VENTOLIN HFA) inhaler 2 puff, 2 puff, Inhalation, Q4H PRN, Devang Ingram MD    apixaban (ELIQUIS) tablet 5 mg, 5 mg, Oral, BID, Devang Ingram MD, 5 mg at 08/03/24 0813    atorvastatin (LIPITOR) tablet 10 mg, 10 mg, Oral, Daily, Devang Ingram MD, 10 mg at 08/03/24 0813    budesonide-formoterol (SYMBICORT) 160-4.5 mcg/act inhaler 2 puff, 2 puff, Inhalation, BID, Devang Ingram MD, 2 puff at 08/03/24 0817    Empagliflozin (JARDIANCE) tablet 10 mg, 10 mg, Oral, Daily, Devang Ingram MD, 10 mg at 08/03/24 0813    fluticasone (FLONASE) 50 mcg/act nasal spray 1  spray, 1 spray, Nasal, BID, Devang Ingram MD    insulin lispro (HumALOG/ADMELOG) 100 units/mL subcutaneous injection 2-12 Units, 2-12 Units, Subcutaneous, TID AC, 2 Units at 08/03/24 0818 **AND** Fingerstick Glucose (POCT), , , TID AC, Alicia Craven, RODRIGUE    insulin lispro (HumALOG/ADMELOG) 100 units/mL subcutaneous injection 2-12 Units, 2-12 Units, Subcutaneous, HS, RODRIGUE Ivy, 4 Units at 08/02/24 2140    metoprolol succinate (TOPROL-XL) 24 hr tablet 50 mg, 50 mg, Oral, Daily, Devang Ingram MD, 50 mg at 08/03/24 0813    montelukast (SINGULAIR) tablet 10 mg, 10 mg, Oral, HS, Devang Ingram MD, 10 mg at 08/02/24 2140    ondansetron (ZOFRAN) injection 4 mg, 4 mg, Intravenous, Q6H PRN, Devang Ingram MD    potassium chloride (Klor-Con M20) CR tablet 40 mEq, 40 mEq, Oral, BID, Capri Bowen, CRNP, 40 mEq at 08/03/24 0813    pregabalin (LYRICA) capsule 50 mg, 50 mg, Oral, HS, Devang Ingram MD, 50 mg at 08/02/24 2140    spironolactone (ALDACTONE) tablet 25 mg, 25 mg, Oral, Daily, Devang Ingram MD, 25 mg at 08/03/24 0813    umeclidinium 62.5 mcg/actuation inhaler AEPB 1 puff, 1 puff, Inhalation, Daily, Devang Ingram MD, 1 puff at 08/03/24 0817      Labs & Results:        Results from last 7 days   Lab Units 08/03/24  0505 08/02/24  0509 08/01/24  0427   WBC Thousand/uL 8.24 7.15 6.52   HEMOGLOBIN g/dL 11.0* 10.9* 10.2*   HEMATOCRIT % 36.4* 35.8* 34.7*   PLATELETS Thousands/uL 290 279 257         Results from last 7 days   Lab Units 08/03/24  0505 08/02/24  1956 08/02/24  0509 07/31/24  0612 07/30/24  1355   POTASSIUM mmol/L 3.5 3.9 3.6   < > 4.1   CHLORIDE mmol/L 93* 91* 93*   < > 104   CO2 mmol/L 31 33* 31   < > 20*   BUN mg/dL 38* 37* 30*   < > 24   CREATININE mg/dL 2.04* 2.13* 1.88*   < > 1.28   CALCIUM mg/dL 8.4 8.9 8.5   < > 8.1*   ALK PHOS U/L  --   --   --   --  95   ALT U/L  --   --   --   --  6*   AST U/L  --   --   --   --  9*    < > = values in this interval not displayed.           Counseling /  Coordination of Care    Thank you for the opportunity to participate in the care of this patient.    Capri HUSAIN

## 2024-08-03 NOTE — ASSESSMENT & PLAN NOTE
Lab Results   Component Value Date    EGFR 35 08/03/2024    EGFR 33 08/02/2024    EGFR 38 08/02/2024    CREATININE 2.04 (H) 08/03/2024    CREATININE 2.13 (H) 08/02/2024    CREATININE 1.88 (H) 08/02/2024   Baseline around 1.5.-2.0  Notably, patient has previous labs with creatinine below his baseline.  These are associated with states of fluid overload.  Suspect his baseline is closer to 1.8-2.0  Avoid nephrotoxins/hypotension  BMP in AM

## 2024-08-03 NOTE — PROGRESS NOTES
[facility]  Progress Note  Name: Mesfin Cano I  MRN: [id number]  Unit/Bed#: [facility] 508-01 I Date of Admission: 7/30/2024   Date of Service: 8/3/2024 I Hospital Day: 4    Assessment & Plan   * Acute on chronic diastolic congestive heart failure (HCC)  Assessment & Plan  Pt presented to ED with complaint of worsening dyspnea, lower extremity edema, and orthopnea.  Patient denies excessive fluid intake or salt intake.  Patient has been compliant with his home Bumex 6 mg twice daily.  In ED, initially patient was requiring HFNC briefly, subsequently taken off currently on room air.  CXR WNL  Heart failure following,  Now on oral diuretics  Continue spironolactone 25 mg daily   GDMT: Continue BB, Jardiance.   Recent echo 6/2024 showed EF 55%.  LA/RA dilated.   I/O, daily weights, Low NA diet with FR  Monitor volume status   Transitioned to oral diuretics    Anemia  Assessment & Plan  Baseline hgb appears 7-8. Due to eosinophilia in 2023 underwent BMB in December.    Per review, morphology evaluation, flow cytometry, cytogenetics, FISH studies and molecular studies all together did not demonstrate any evidence of a primary hematologic malignancy. Specifically pathologist thought that the elevated eosinophil count was secondary/reactive to the patient's medical issues per hematologist note  Not on PO iron due to hx of Samuel-en-Y gastric bypass  Stable, 10.9 today       Moderate persistent asthma without complication  Assessment & Plan  Without acute exacerbation   Continue home inhalers Symbicort, umeclidinium, Singulair.    Albuterol as needed    Diabetic polyneuropathy associated with type 2 diabetes mellitus (HCC)  Assessment & Plan  Lab Results   Component Value Date    HGBA1C 8.3 (H) 06/19/2024     (P) [id number] Lyrica for chronic neuropathy   Hold home Januvia  Continue Jardiance here for HF  Continue SSI coverage while inpatient   Monitor accuchecks and adjust  regimen PRN      CKD (chronic kidney disease) stage 3 (HCC)  Assessment & Plan  Lab Results   Component Value Date    EGFR 35 08/03/2024    EGFR 33 08/02/2024    EGFR 38 08/02/2024    CREATININE 2.04 (H) 08/03/2024    CREATININE 2.13 (H) 08/02/2024    CREATININE 1.88 (H) 08/02/2024   Baseline around 1.5.-2.0  Notably, patient has previous labs with creatinine below his baseline.  These are associated with states of fluid overload.  Suspect his baseline is closer to 1.8-2.0  Avoid nephrotoxins/hypotension  BMP in AM      Paroxysmal atrial fibrillation (HCC)  Assessment & Plan  Anticoagulated with Eliquis 5 mg BID    Rate controlled with Toprol XL 50 mg daily     VICKY (obstructive sleep apnea)  Assessment & Plan  Currently not using CPAP at bedtime  Encourage compliance     Morbid obesity (HCC)  Assessment & Plan  Affects all aspects of health, weight loss advised.   Prior hx of Samuel-en-Y gastric bypass noted   BMI of 42.76    Hyperlipidemia  Assessment & Plan  Continue Lipitor 10 mg daily     Acute respiratory failure with hypoxia (HCC)-resolved as of 8/2/2024  Assessment & Plan  POA, since resolved, due to acute CHF evidenced by increased WOB, SOB and tachypnea requiring Bipap and HFNC in the ED  Now saturating well on RA, continue IV diuresis per HF team                VTE Pharmacologic Prophylaxis:   High Risk (Score >/= 5) - Pharmacological DVT Prophylaxis Ordered: apixaban (Eliquis). Sequential Compression Devices Ordered.    Mobility:   Basic Mobility Inpatient Raw Score: 24  JH-HLM Goal: 8: Walk 250 feet or more  JH-HLM Achieved: 6: Walk 10 steps or more  JH-HLM Goal NOT achieved. Continue with multidisciplinary rounding and encourage appropriate mobility to improve upon JH-HLM goals.    Patient Centered Rounds: I performed bedside rounds with nursing staff today.   Discussions with Specialists or Other Care Team Provider: Cardiology    Education and Discussions with Family / Patient: Patient declined call to  .     Total Time Spent on Date of Encounter in care of patient: 45 mins. This time was spent on one or more of the following: performing physical exam; counseling and coordination of care; obtaining or reviewing history; documenting in the medical record; reviewing/ordering tests, medications or procedures; communicating with other healthcare professionals and discussing with patient's family/caregivers.    Current Length of Stay: 4 day(s)  Current Patient Status: Inpatient   Certification Statement: The patient will continue to require additional inpatient hospital stay due to renal monitoring, CHF  Discharge Plan: Anticipate discharge tomorrow to home.    Code Status: Level 1 - Full Code    Subjective:   No acute events overnight    Objective:     Vitals:   Temp (24hrs), Av.8 °F (36.6 °C), Min:97.3 °F (36.3 °C), Max:98.8 °F (37.1 °C)    Temp:  [97.3 °F (36.3 °C)-98.8 °F (37.1 °C)] 97.8 °F (36.6 °C)  HR:  [69-82] 70  Resp:  [16-19] 19  BP: (107-138)/(60-76) 132/74  SpO2:  [93 %-98 %] 97 %  Body mass index is 42.19 kg/m².     Input and Output Summary (last 24 hours):     Intake/Output Summary (Last 24 hours) at 8/3/2024 1411  Last data filed at 8/3/2024 1145  Gross per 24 hour   Intake 978.2 ml   Output 2750 ml   Net -1771.8 ml       Physical Exam:   Physical Exam  Vitals and nursing note reviewed.   Constitutional:       Appearance: Normal appearance.   HENT:      Head: Normocephalic and atraumatic.      Mouth/Throat:      Mouth: Mucous membranes are moist.      Pharynx: Oropharynx is clear. No oropharyngeal exudate.   Eyes:      Extraocular Movements: Extraocular movements intact.   Cardiovascular:      Rate and Rhythm: Normal rate and regular rhythm.      Pulses: Normal pulses.      Heart sounds: Normal heart sounds. No murmur heard.     No friction rub. No gallop.   Pulmonary:      Effort: Pulmonary effort is normal. No respiratory distress.      Breath sounds: Normal breath sounds. No stridor.  No wheezing or rales.   Abdominal:      General: Abdomen is flat. Bowel sounds are normal. There is no distension.      Palpations: Abdomen is soft.      Tenderness: There is no abdominal tenderness.   Musculoskeletal:      Right lower leg: No edema.      Left lower leg: No edema.   Skin:     General: Skin is warm and dry.   Neurological:      General: No focal deficit present.      Mental Status: He is alert and oriented to person, place, and time.          Additional Data:     Labs:  Results from last 7 days   Lab Units 08/03/24  0505 08/02/24  0509 08/01/24  0427   WBC Thousand/uL 8.24   < > 6.52   HEMOGLOBIN g/dL 11.0*   < > 10.2*   HEMATOCRIT % 36.4*   < > 34.7*   PLATELETS Thousands/uL 290   < > 257   SEGS PCT %  --   --  66   LYMPHO PCT %  --   --  15   MONO PCT %  --   --  9   EOS PCT %  --   --  8*    < > = values in this interval not displayed.     Results from last 7 days   Lab Units 08/03/24  0505 07/31/24  0612 07/30/24  1355   SODIUM mmol/L 137   < > 135   POTASSIUM mmol/L 3.5   < > 4.1   CHLORIDE mmol/L 93*   < > 104   CO2 mmol/L 31   < > 20*   BUN mg/dL 38*   < > 24   CREATININE mg/dL 2.04*   < > 1.28   ANION GAP mmol/L 13   < > 11   CALCIUM mg/dL 8.4   < > 8.1*   ALBUMIN g/dL  --   --  3.6   TOTAL BILIRUBIN mg/dL  --   --  0.40   ALK PHOS U/L  --   --  95   ALT U/L  --   --  6*   AST U/L  --   --  9*   GLUCOSE RANDOM mg/dL 154*   < > 219*    < > = values in this interval not displayed.         Results from last 7 days   Lab Units 08/03/24  1046 08/03/24  0627 08/02/24 2035 08/02/24  1614 08/02/24  1043 08/02/24  0607 08/01/24  2031 08/01/24  1549 08/01/24  1039 08/01/24  0633 07/31/24  2037 07/31/24  1540   POC GLUCOSE mg/dl 217* 163* 205* 169* 201* 148* 183* 138 206* 150* 165* 162*               Lines/Drains:  Invasive Devices       Peripheral Intravenous Line  Duration             Peripheral IV 07/31/24 Right Antecubital 3 days                      Telemetry:  Telemetry Orders (From admission,  onward)               24 Hour Telemetry Monitoring  Continuous x 24 Hours (Telem)        Question:  Reason for 24 Hour Telemetry  Answer:  Decompensated CHF- and any one of the following: continuous diuretic infusion or total diuretic dose >200 mg daily, associated electrolyte derangement (I.e. K < 3.0), ionotropic drip (continuous infusion), hx of ventricular arrhythmia, or new EF < 35%                     Telemetry Reviewed: Normal Sinus Rhythm  Indication for Continued Telemetry Use: No indication for continued use. Will discontinue.              Imaging: No pertinent imaging reviewed.    Recent Cultures (last 7 days):         Last 24 Hours Medication List:   Current Facility-Administered Medications   Medication Dose Route Frequency Provider Last Rate    acetaminophen  650 mg Oral Q6H PRN Devang Ingram MD      albuterol  2 puff Inhalation Q4H PRN Devang Ingram MD      apixaban  5 mg Oral BID Devang Ingram MD      atorvastatin  10 mg Oral Daily Devang Ingram MD      budesonide-formoterol  2 puff Inhalation BID Devang Ingram MD      bumetanide  6 mg Oral TID RODRIGUE Rodriges      Empagliflozin  10 mg Oral Daily Devang Ingram MD      fluticasone  1 spray Nasal BID Devang Ingram MD      insulin lispro  2-12 Units Subcutaneous TID AC RODRIGUE Ivy      insulin lispro  2-12 Units Subcutaneous HS RODRIGUE Ivy      metoprolol succinate  50 mg Oral Daily Devang Ingram MD      montelukast  10 mg Oral HS Devang Ingram MD      ondansetron  4 mg Intravenous Q6H PRN Devang Ingram MD      potassium chloride  40 mEq Oral TID With Meals RODRIGUE Rodriges      pregabalin  50 mg Oral HS Devang Ingram MD      spironolactone  25 mg Oral Daily Devang Ingram MD      umeclidinium  1 puff Inhalation Daily Devang Ingram MD          Today, Patient Was Seen By: Wm Nair PA-C    **Please Note: This note may have been constructed using a voice recognition system.**

## 2024-08-03 NOTE — ASSESSMENT & PLAN NOTE
Lab Results   Component Value Date    HGBA1C 8.3 (H) 06/19/2024     (P) [id number] Lyrica for chronic neuropathy   Hold home Januvia  Continue Jardiance here for HF  Continue SSI coverage while inpatient   Monitor accuchecks and adjust regimen PRN

## 2024-08-03 NOTE — ASSESSMENT & PLAN NOTE
Pt presented to ED with complaint of worsening dyspnea, lower extremity edema, and orthopnea.  Patient denies excessive fluid intake or salt intake.  Patient has been compliant with his home Bumex 6 mg twice daily.  In ED, initially patient was requiring HFNC briefly, subsequently taken off currently on room air.  CXR WNL  Heart failure following,  Now on oral diuretics  Continue spironolactone 25 mg daily   GDMT: Continue BB, Jardiance.   Recent echo 6/2024 showed EF 55%.  LA/RA dilated.   I/O, daily weights, Low NA diet with FR  Monitor volume status   Transitioned to oral diuretics

## 2024-08-04 VITALS
SYSTOLIC BLOOD PRESSURE: 137 MMHG | WEIGHT: 315 LBS | RESPIRATION RATE: 17 BRPM | HEART RATE: 66 BPM | HEIGHT: 73 IN | BODY MASS INDEX: 41.75 KG/M2 | TEMPERATURE: 97.7 F | OXYGEN SATURATION: 98 % | DIASTOLIC BLOOD PRESSURE: 76 MMHG

## 2024-08-04 LAB
ANION GAP SERPL CALCULATED.3IONS-SCNC: 12 MMOL/L (ref 4–13)
BUN SERPL-MCNC: 39 MG/DL (ref 5–25)
CALCIUM SERPL-MCNC: 8.3 MG/DL (ref 8.4–10.2)
CHLORIDE SERPL-SCNC: 94 MMOL/L (ref 96–108)
CO2 SERPL-SCNC: 31 MMOL/L (ref 21–32)
CREAT SERPL-MCNC: 1.99 MG/DL (ref 0.6–1.3)
ERYTHROCYTE [DISTWIDTH] IN BLOOD BY AUTOMATED COUNT: 20.3 % (ref 11.6–15.1)
GFR SERPL CREATININE-BSD FRML MDRD: 36 ML/MIN/1.73SQ M
GLUCOSE SERPL-MCNC: 156 MG/DL (ref 65–140)
GLUCOSE SERPL-MCNC: 161 MG/DL (ref 65–140)
GLUCOSE SERPL-MCNC: 223 MG/DL (ref 65–140)
HCT VFR BLD AUTO: 35.1 % (ref 36.5–49.3)
HGB BLD-MCNC: 10.7 G/DL (ref 12–17)
MAGNESIUM SERPL-MCNC: 2.4 MG/DL (ref 1.9–2.7)
MCH RBC QN AUTO: 25.2 PG (ref 26.8–34.3)
MCHC RBC AUTO-ENTMCNC: 30.5 G/DL (ref 31.4–37.4)
MCV RBC AUTO: 83 FL (ref 82–98)
PLATELET # BLD AUTO: 302 THOUSANDS/UL (ref 149–390)
PMV BLD AUTO: 10.2 FL (ref 8.9–12.7)
POTASSIUM SERPL-SCNC: 4.6 MMOL/L (ref 3.5–5.3)
RBC # BLD AUTO: 4.24 MILLION/UL (ref 3.88–5.62)
SODIUM SERPL-SCNC: 137 MMOL/L (ref 135–147)
WBC # BLD AUTO: 7.85 THOUSAND/UL (ref 4.31–10.16)

## 2024-08-04 PROCEDURE — 85027 COMPLETE CBC AUTOMATED: CPT | Performed by: PHYSICIAN ASSISTANT

## 2024-08-04 PROCEDURE — 80048 BASIC METABOLIC PNL TOTAL CA: CPT | Performed by: INTERNAL MEDICINE

## 2024-08-04 PROCEDURE — 99239 HOSP IP/OBS DSCHRG MGMT >30: CPT | Performed by: PHYSICIAN ASSISTANT

## 2024-08-04 PROCEDURE — 83735 ASSAY OF MAGNESIUM: CPT | Performed by: PHYSICIAN ASSISTANT

## 2024-08-04 PROCEDURE — 82948 REAGENT STRIP/BLOOD GLUCOSE: CPT

## 2024-08-04 PROCEDURE — 99232 SBSQ HOSP IP/OBS MODERATE 35: CPT | Performed by: NURSE PRACTITIONER

## 2024-08-04 RX ORDER — POTASSIUM CHLORIDE 20 MEQ/1
40 TABLET, EXTENDED RELEASE ORAL 2 TIMES DAILY
Status: DISCONTINUED | OUTPATIENT
Start: 2024-08-04 | End: 2024-08-04 | Stop reason: HOSPADM

## 2024-08-04 RX ORDER — BUMETANIDE 2 MG/1
6 TABLET ORAL 3 TIMES DAILY
Qty: 270 TABLET | Refills: 0 | Status: SHIPPED | OUTPATIENT
Start: 2024-08-04 | End: 2024-09-03

## 2024-08-04 RX ADMIN — UMECLIDINIUM 1 PUFF: 62.5 AEROSOL, POWDER ORAL at 08:20

## 2024-08-04 RX ADMIN — INSULIN LISPRO 4 UNITS: 100 INJECTION, SOLUTION INTRAVENOUS; SUBCUTANEOUS at 11:28

## 2024-08-04 RX ADMIN — METOPROLOL SUCCINATE 50 MG: 50 TABLET, EXTENDED RELEASE ORAL at 08:18

## 2024-08-04 RX ADMIN — INSULIN LISPRO 2 UNITS: 100 INJECTION, SOLUTION INTRAVENOUS; SUBCUTANEOUS at 08:21

## 2024-08-04 RX ADMIN — APIXABAN 5 MG: 5 TABLET, FILM COATED ORAL at 08:18

## 2024-08-04 RX ADMIN — ATORVASTATIN CALCIUM 10 MG: 10 TABLET, FILM COATED ORAL at 08:18

## 2024-08-04 RX ADMIN — SPIRONOLACTONE 25 MG: 25 TABLET ORAL at 08:18

## 2024-08-04 RX ADMIN — EMPAGLIFLOZIN 10 MG: 10 TABLET, FILM COATED ORAL at 08:18

## 2024-08-04 RX ADMIN — BUMETANIDE 6 MG: 2 TABLET ORAL at 08:18

## 2024-08-04 RX ADMIN — BUDESONIDE AND FORMOTEROL FUMARATE DIHYDRATE 2 PUFF: 160; 4.5 AEROSOL RESPIRATORY (INHALATION) at 08:20

## 2024-08-04 RX ADMIN — POTASSIUM CHLORIDE 40 MEQ: 1500 TABLET, EXTENDED RELEASE ORAL at 08:18

## 2024-08-04 NOTE — NURSING NOTE
Patient given discharge instructions and reviewed with the patient in the room. The patient was understanding of the instructions that were provided to the patient.     IV removed. All belongings accounted for and sent home with patient.     Patient left facility via wheelchair and PCA assistance.

## 2024-08-04 NOTE — ASSESSMENT & PLAN NOTE
Baseline hgb appears 7-8. Due to eosinophilia in 2023 underwent BMB in December.    Per review, morphology evaluation, flow cytometry, cytogenetics, FISH studies and molecular studies all together did not demonstrate any evidence of a primary hematologic malignancy. Specifically pathologist thought that the elevated eosinophil count was secondary/reactive to the patient's medical issues per hematologist note  Not on PO iron due to hx of Samule-en-Y gastric bypass  Stable, 10.9 today

## 2024-08-04 NOTE — PROGRESS NOTES
Heart Failure/ Pulmonary Hypertension Progress Note - Mesfin Cano 59 y.o. male MRN: [id number]    Unit/Bed#: [facility] 508-01 Encounter: [id number]      Assessment:    Principal Problem:    Acute on chronic diastolic congestive heart failure (HCC)  Active Problems:    Hyperlipidemia    Morbid obesity (HCC)    VICKY (obstructive sleep apnea)    Paroxysmal atrial fibrillation (HCC)    CKD (chronic kidney disease) stage 3 (HCC)    Diabetic polyneuropathy associated with type 2 diabetes mellitus (HCC)    Moderate persistent asthma without complication    Anemia      Subjective:   59 year old with PMH as above who presented to the ED today with complaints of worsening SOB, leg swelling and abdominal distension. CardioMems readings above goal since 7/21. He is already on high dose diuretics along with MRA and SGLT2i. He reports no significant dietary indiscretions. Has been using Metolazone more frequently but with inadequate response. Of note, recently admitted on  6/18/2024 due to 1 week history of chest pain and shortness of breath.  In the ER he was noted to have uncontrolled A-fib/flutter.  Underwent cardioversion after which symptoms resolved.  Patient continued with chest pain and stress test was performed which was abnormal as above, however general cardiology team felt results were similar compared to prior and recommended outpatient CTA.  Was euvolemic while admitted     Patient seen and examined.  No significant events overnight. Feels better.      Objective:   Intake/ Output: 1217/3575/-2.4 L  Weight: 318 lbs today, 330 on admit.   Tele: SR  MAPs: 80s    Acute on Chronic HFpEF, Stage C, NYHA III.    -PAD above goal since end June in the low 20's  -current exacerbation with unclear trigger, possibly dietary or pulmonary component, ?diuretic resistance.  -Continue  oral regimen Bumex 6 mg TID with Kdur 40 meq BID.   -Will check a BMP just prior to his follow-up visit, reassess potassium needs  -Advised he send  Cardiomems transmission immediately post discharge.   -Ok for discharge home today,      Volume management-home :  Bumex 6 mg PO TID, Jardiance 10 mg daily, Spironolactone 25 mg daily, Metolazone 5 mg PRN     Cardiomems Data: PAD Goal- 15 mmhg     7/21 - 20.  7/22 - 25.  7/23 - 21.  7/24 - 20.  7/25 - 18.  7/26 - 22.       7/27 - No Reading  7/28 - 20.  7/29 - 21.  7/30 - 18        NPI 6/25/24:  There is a left ventricular perfusion defect that is medium in size with moderate reduction in uptake present in the entire inferior and inferolateral location(s) that is partially reversible. The possibility of this defect being caused by diaphragmatic attenuation artifact cannot be completely excluded.      Echo 6/20/24 EF 55%, akinetic basal inferolateral and mid inferolateral      RHC with Cardiomems calibration:  CardioMEMS rep Maciel Shabazz was present throughout the case and provided the simultaneous MEMS interrogation data.     -140 during case  HR 74  O2 sat 97%  Hgb 10.5     RA 8  RV 30/8  PA 28/12, 17  PCWP 10-12 (multiple checks)  TPG 5  PA sat 59% (multiple checks)  Travis CO/CI: 6.6/2.3  TD CO/CI: 7.43/2.78     Simultaneous CardioMEMS data:  Mean: 26/10/15  End expiration on MEMS waveform: 28/10      Impression/Recommendations:  The patient tolerated the procedure well; no immediate complications.  Normal biv filling pressures (after inpt diuresis).  Normal CO/CI.  CardioMEMS data correlates to RHC.  s/p CardioMems implant 3/9/22, GOAL PAD 15-18  PAD 16 10/6, 14 10/5.      BNP 10/6/23: 93  122  233      Studies  LHC 9/6/22: No obstructive CAD  Pharm Nuc Stress 9/2/22: Abnormal study due to the presence of an inferior and inferolateral infarct without significant ischemia.  RHC 3/9/22:   RA 4   RV 35/4   PA 35/12/21   PCWP 10   PA sat 64%   Travis CO 5.56 L/min   Travis CI 2.2L/min/m2   PVR 1.97 SAGASTUME   TTE 4/11/2023: LVEF 50%.  Normal wall motion.  Grade 2 DD.  RV cavity size and systolic function normal.  LA  "mildly dilated.  Trace MR, TR.  Normal IVC.  TTE 03/25/2020: LVEF 40-45%. LVIDd 6.12 cm. Normal RV.    TTE 07/19/2021: LVEF 50%. LVIDd 6.13 cm. Grade 1 DD. Normal RV. Trace MR and TR. Mildly dilated IVC.    TTE 11/12/2021: LVEF 55%. LVIDd 6.0 cm. Grade 1 DD. Normal RV. Trace TR.       Syncope with fall  -Had recent  isolated episode at home  -unconscious for a few seconds per patient report  -possibly 2/2 orthostatic hypotension ?  -Zio patch was placed but fell off     Atrial fibrillation/flutter with RVR   Currently maintaining SR  [id number] = 3 (HF, HTN, DM).   Anticoagulation on Eliquis.    Rate control: Metop succinate 50 mg daily   Rhythm control:S/P DCCV     Hypertension   Hyperlipidemia         Lab Results   Component Value Date     LDLCALC 86 10/06/2023    Rx: atorvastatin 10 mg daily.      Diabetes mellitus, type II     Non-insulin dependent.             Lab Results   Component Value Date     HGBA1C 8.3 (H) 06/19/2024      Obstructive sleep apnea  Not currently using CPAP given machine recall. Needs repeat sleep study  Chronic respiratory failure   Asthma, moderate persistent    S/p gastric bypass (Samuel-en-Y)surgery    History of tobacco abuse   Carpal tunnel syndrome, bilateral    CKD, recent baseline 1.5-2.0. Stable.   Eosinophilia/anemia. Unclear etiology. For BMB in upcoming weeks. Follows with hem/onc.     Review of Systems   All other systems reviewed and are negative.       Central Line (day, reason):  Cotton catheter (day, reason):    Vitals: Blood pressure 122/72, pulse 72, temperature 98.1 °F (36.7 °C), temperature source Oral, resp. rate 17, height 6' 1\" (1.854 m), weight (!) 144 kg (318 lb 6.4 oz), SpO2 98%., Body mass index is 42.01 kg/m²., I/O last 3 completed shifts:  In: 1507.6 [P.O.:1440; I.V.:67.6]  Out: 5025 [Urine:5025]  No intake/output data recorded.  Wt Readings from Last 3 Encounters:   08/04/24 (!) 144 kg (318 lb 6.4 oz)   07/19/24 (!) 150 kg (330 lb)   06/26/24 (!) 149 kg (328 lb " 3.2 oz)       Intake/Output Summary (Last 24 hours) at 8/4/2024 0855  Last data filed at 8/4/2024 0500  Gross per 24 hour   Intake 977.2 ml   Output 3575 ml   Net -2597.8 ml     I/O last 3 completed shifts:  In: 1507.6 [P.O.:1440; I.V.:67.6]  Out: 5025 [Urine:5025]      Physical Exam:  Vitals:    08/03/24 1716 08/03/24 2252 08/04/24 0500 08/04/24 0740   BP: 134/71 119/75  122/72   BP Location:    Left arm   Pulse: 79 71  72   Resp: 15   17   Temp:  97.9 °F (36.6 °C)  98.1 °F (36.7 °C)   TempSrc:    Oral   SpO2: 98% 96%  98%   Weight:   (!) 144 kg (318 lb 6.4 oz)    Height:           GEN: Mesfin Cano appears well, alert and oriented x 3, pleasant and cooperative   HEENT: pupils equal, round, and reactive to light; extraocular muscles intact  NECK: supple, no carotid bruits   HEART: regular rhythm, normal S1 and S2, no murmurs, clicks, gallops or rubs, JVP is down  LUNGS: clear to auscultation bilaterally; no wheezes, rales, or rhonchi   ABDOMEN: normal bowel sounds, soft, no tenderness, no distention  EXTREMITIES: peripheral pulses normal; no clubbing, cyanosis, trace BLLE edema  NEURO: no focal findings   SKIN: normal without suspicious lesions on exposed skin      Current Facility-Administered Medications:     acetaminophen (TYLENOL) tablet 650 mg, 650 mg, Oral, Q6H PRN, Devang Ingram MD    albuterol (PROVENTIL HFA,VENTOLIN HFA) inhaler 2 puff, 2 puff, Inhalation, Q4H PRN, Devang Ingram MD    apixaban (ELIQUIS) tablet 5 mg, 5 mg, Oral, BID, Devang Ingram MD, 5 mg at 08/04/24 0818    atorvastatin (LIPITOR) tablet 10 mg, 10 mg, Oral, Daily, Devang Ingram MD, 10 mg at 08/04/24 0818    budesonide-formoterol (SYMBICORT) 160-4.5 mcg/act inhaler 2 puff, 2 puff, Inhalation, BID, Devang Ingram MD, 2 puff at 08/04/24 0820    bumetanide (BUMEX) tablet 6 mg, 6 mg, Oral, TID, RODRIGUE Rodriges, 6 mg at 08/04/24 0818    Empagliflozin (JARDIANCE) tablet 10 mg, 10 mg, Oral, Daily, Devang Ingram MD, 10 mg at 08/04/24 0818     fluticasone (FLONASE) 50 mcg/act nasal spray 1 spray, 1 spray, Nasal, BID, Devang Ingram MD    insulin lispro (HumALOG/ADMELOG) 100 units/mL subcutaneous injection 2-12 Units, 2-12 Units, Subcutaneous, TID AC, 2 Units at 08/04/24 0821 **AND** Fingerstick Glucose (POCT), , , TID AC, RODRIGUE Ivy    insulin lispro (HumALOG/ADMELOG) 100 units/mL subcutaneous injection 2-12 Units, 2-12 Units, Subcutaneous, HS, Alicia Craven, RODRIGUE, 2 Units at 08/03/24 2111    metoprolol succinate (TOPROL-XL) 24 hr tablet 50 mg, 50 mg, Oral, Daily, Devang Ingram MD, 50 mg at 08/04/24 0818    montelukast (SINGULAIR) tablet 10 mg, 10 mg, Oral, HS, Devang Ingram MD, 10 mg at 08/03/24 2100    ondansetron (ZOFRAN) injection 4 mg, 4 mg, Intravenous, Q6H PRN, Devang Ingram MD    potassium chloride (Klor-Con M20) CR tablet 40 mEq, 40 mEq, Oral, TID With Meals, RODRIGUE Rodriges, 40 mEq at 08/04/24 0818    pregabalin (LYRICA) capsule 50 mg, 50 mg, Oral, HS, Devang Ingram MD, 50 mg at 08/03/24 2100    spironolactone (ALDACTONE) tablet 25 mg, 25 mg, Oral, Daily, Devang Ingram MD, 25 mg at 08/04/24 0818    umeclidinium 62.5 mcg/actuation inhaler AEPB 1 puff, 1 puff, Inhalation, Daily, Devang Ingram MD, 1 puff at 08/04/24 0820      Labs & Results:        Results from last 7 days   Lab Units 08/04/24  0530 08/03/24  0505 08/02/24  0509   WBC Thousand/uL 7.85 8.24 7.15   HEMOGLOBIN g/dL 10.7* 11.0* 10.9*   HEMATOCRIT % 35.1* 36.4* 35.8*   PLATELETS Thousands/uL 302 290 279         Results from last 7 days   Lab Units 08/04/24  0530 08/03/24  0505 08/02/24  1956 07/31/24  0612 07/30/24  1355   POTASSIUM mmol/L 4.6 3.5 3.9   < > 4.1   CHLORIDE mmol/L 94* 93* 91*   < > 104   CO2 mmol/L 31 31 33*   < > 20*   BUN mg/dL 39* 38* 37*   < > 24   CREATININE mg/dL 1.99* 2.04* 2.13*   < > 1.28   CALCIUM mg/dL 8.3* 8.4 8.9   < > 8.1*   ALK PHOS U/L  --   --   --   --  95   ALT U/L  --   --   --   --  6*   AST U/L  --   --   --   --  9*    < > = values in  this interval not displayed.           Counseling / Coordination of Care    Thank you for the opportunity to participate in the care of this patient.    Capri HUSAIN

## 2024-08-04 NOTE — RESTORATIVE TECHNICIAN NOTE
Restorative Technician Note      Patient Name: Mesfin Cano     Restorative Tech Visit Date: 08/04/24  Note Type: Mobility  Patient Position Upon Consult: Supine  Mobility / Activity Provided: pt ambulating independently in the hallway  Activity Performed: Ambulated  Assistive Device: Other (Comment) (none)  Patient Position at End of Consult: Supine; All needs within reach

## 2024-08-04 NOTE — DISCHARGE INSTR - AVS FIRST PAGE
You were admitted due to heart failure exacerbation. On admission you weighed 330lbs and on discharge you weigh 318lbs (which is your assumed dry weight).    Only change in your medication is increasing the frequency of your diuretics from twice a day to three times a day. Continue spironolactone and jardiance, and use metolazone as needed if you weigh 320 lbs or more.    On discharge:  - Make sure to send your CardioMems to cardiology  - Follow up with cardiology, get blood work before

## 2024-08-04 NOTE — DISCHARGE SUMMARY
[facility]  Discharge- Mesfin Cano 1965, 59 y.o. male MRN: [id number]  Unit/Bed#: [facility]PHP 508-01 Encounter: [id number]  Primary Care Provider: Soo Chavez MD   Date and time admitted to hospital: 7/30/2024  1:33 PM    * Acute on chronic diastolic congestive heart failure (HCC)  Assessment & Plan  Pt presented to ED with complaint of worsening dyspnea, lower extremity edema, and orthopnea.  Patient denies excessive fluid intake or salt intake.  Patient has been compliant with his home Bumex 6 mg twice daily.  In ED, initially patient was requiring HFNC briefly, subsequently taken off currently on room air.  CXR WNL  Heart failure following,  Now on oral diuretics  Continue spironolactone 25 mg daily   GDMT: Continue BB, Jardiance.   Recent echo 6/2024 showed EF 55%.  LA/RA dilated.   I/O, daily weights, Low NA diet with FR  Monitor volume status   Transitioned to oral diuretics stable for discharge    Anemia  Assessment & Plan  Baseline hgb appears 7-8. Due to eosinophilia in 2023 underwent BMB in December.    Per review, morphology evaluation, flow cytometry, cytogenetics, FISH studies and molecular studies all together did not demonstrate any evidence of a primary hematologic malignancy. Specifically pathologist thought that the elevated eosinophil count was secondary/reactive to the patient's medical issues per hematologist note  Not on PO iron due to hx of Samuel-en-Y gastric bypass  Stable, 10.9 today       Moderate persistent asthma without complication  Assessment & Plan  Without acute exacerbation   Continue home inhalers Symbicort, umeclidinium, Singulair.    Albuterol as needed    Diabetic polyneuropathy associated with type 2 diabetes mellitus (HCC)  Assessment & Plan  Lab Results   Component Value Date    HGBA1C 8.3 (H) 06/19/2024     (P) [id number] Lyrica for chronic neuropathy   Restart home Januvia  Continue Jardiance here for  HF  Continue SSI coverage while inpatient   Monitor accuchecks and adjust regimen PRN      CKD (chronic kidney disease) stage 3 (HCC)  Assessment & Plan  Lab Results   Component Value Date    EGFR 36 08/04/2024    EGFR 35 08/03/2024    EGFR 33 08/02/2024    CREATININE 1.99 (H) 08/04/2024    CREATININE 2.04 (H) 08/03/2024    CREATININE 2.13 (H) 08/02/2024   Baseline around 1.5.-2.0  Notably, patient has previous labs with creatinine below his baseline.  These are associated with states of fluid overload.  Suspect his baseline is closer to 1.8-2.0  Avoid nephrotoxins/hypotension  BMP in AM      Paroxysmal atrial fibrillation (HCC)  Assessment & Plan  Anticoagulated with Eliquis 5 mg BID    Rate controlled with Toprol XL 50 mg daily     VICKY (obstructive sleep apnea)  Assessment & Plan  Currently not using CPAP at bedtime  Encourage compliance     Morbid obesity (HCC)  Assessment & Plan  Affects all aspects of health, weight loss advised.   Prior hx of Samuel-en-Y gastric bypass noted   BMI of 42.76    Hyperlipidemia  Assessment & Plan  Continue Lipitor 10 mg daily     Acute respiratory failure with hypoxia (HCC)-resolved as of 8/2/2024  Assessment & Plan  POA, since resolved, due to acute CHF evidenced by increased WOB, SOB and tachypnea requiring Bipap and HFNC in the ED  Now saturating well on RA, continue IV diuresis per HF team         Medical Problems       Resolved Problems  Date Reviewed: 8/4/2024            Resolved    Acute respiratory failure with hypoxia (HCC) 8/2/2024     Resolved by  Wm Nair PA-C        Discharging Physician / Practitioner: Wm Nair PA-C  PCP: Soo Chavez MD  Admission Date:   Admission Orders (From admission, onward)       Ordered        07/30/24 1702  INPATIENT ADMISSION  Once                          Discharge Date: 08/04/24    Consultations During Hospital Stay:  Cardiology    Procedures Performed:   None    Significant Findings / Test Results:   XR chest 1  "view portable    Result Date: 7/30/2024  Impression: No acute cardiopulmonary disease. Workstation performed: [id number]        Incidental Findings:   None      Test Results Pending at Discharge (will require follow up):   None     Outpatient Tests Requested:  BMP prior to cardiology visit    Complications: None    Reason for Admission: Shortness of breath    Hospital Course:   Mesfin Cano is a 59 y.o. male patient with PMH diastolic heart failure, A-fib/flutter on Eliquis, morbid obesity, T2DM, neuropathy who originally presented to the hospital on 7/30/2024 due to worsening dyspnea as well as bilateral lower extremity edema and orthopnea x 24-48 hours.  He was found to be in a heart failure exacerbation with elevated proBNP and +3 pitting edema as well as temporarily requiring high flow nasal cannula in the emergency department (of note, this was weaned off to room air on the same day).  He was followed by her cardiology heart failure team.  Underwent IV diuresis with Bumex gtt.  On admission, weight 330 pounds, on discharge weight is 318 pounds.  He is to follow-up with cardiology outpatient and they have requested that he since then his CardioMEMS transmission as well as obtain blood work prior to next visit.      Please see above list of diagnoses and related plan for additional information.     Condition at Discharge: fair    Discharge Day Visit / Exam:   Subjective: No acute events overnight.  Patient denies any dyspnea.  Ambulating without difficulty.  Vitals: Blood Pressure: 133/76 (08/04/24 1057)  Pulse: 61 (08/04/24 1057)  Temperature: 97.8 °F (36.6 °C) (08/04/24 1057)  Temp Source: Oral (08/04/24 0740)  Respirations: 17 (08/04/24 0740)  Height: 6' 1\" (185.4 cm) (07/31/24 1251)  Weight - Scale: (!) 144 kg (318 lb 6.4 oz) (08/04/24 0500)  SpO2: 97 % (08/04/24 1057)  Exam:   Physical Exam  Vitals and nursing note reviewed.   Constitutional:       Appearance: He is obese.   HENT:      Head: Normocephalic " and atraumatic.      Mouth/Throat:      Mouth: Mucous membranes are moist.      Pharynx: Oropharynx is clear. No oropharyngeal exudate.   Eyes:      Extraocular Movements: Extraocular movements intact.   Cardiovascular:      Rate and Rhythm: Normal rate and regular rhythm.      Pulses: Normal pulses.      Heart sounds: Normal heart sounds. No murmur heard.     No friction rub. No gallop.   Pulmonary:      Effort: Pulmonary effort is normal. No respiratory distress.      Breath sounds: Normal breath sounds. No stridor. No wheezing or rales.   Abdominal:      General: Abdomen is flat. Bowel sounds are normal. There is no distension.      Palpations: Abdomen is soft.      Tenderness: There is no abdominal tenderness.   Musculoskeletal:      Right lower leg: Edema (Improved) present.      Left lower leg: Edema (Improved) present.   Skin:     General: Skin is warm and dry.   Neurological:      General: No focal deficit present.      Mental Status: He is alert and oriented to person, place, and time.          Discussion with Family: Patient declined call to .     Discharge instructions/Information to patient and family:   See after visit summary for information provided to patient and family.      Provisions for Follow-Up Care:  See after visit summary for information related to follow-up care and any pertinent home health orders.      Mobility at time of Discharge:   Basic Mobility Inpatient Raw Score: 24  JH-HLM Goal: 8: Walk 250 feet or more  JH-HLM Achieved: 8: Walk 250 feet ot more  HLM Goal achieved. Continue to encourage appropriate mobility.     Disposition:   Home    Planned Readmission: None     Discharge Statement:  I spent 45 minutes discharging the patient. This time was spent on the day of discharge. I had direct contact with the patient on the day of discharge. Greater than 50% of the total time was spent examining patient, answering all patient questions, arranging and discussing plan of care  with patient as well as directly providing post-discharge instructions.  Additional time then spent on discharge activities.    Discharge Medications:  See after visit summary for reconciled discharge medications provided to patient and/or family.      **Please Note: This note may have been constructed using a voice recognition system**

## 2024-08-04 NOTE — ASSESSMENT & PLAN NOTE
Pt presented to ED with complaint of worsening dyspnea, lower extremity edema, and orthopnea.  Patient denies excessive fluid intake or salt intake.  Patient has been compliant with his home Bumex 6 mg twice daily.  In ED, initially patient was requiring HFNC briefly, subsequently taken off currently on room air.  CXR WNL  Heart failure following,  Now on oral diuretics  Continue spironolactone 25 mg daily   GDMT: Continue BB, Jardiance.   Recent echo 6/2024 showed EF 55%.  LA/RA dilated.   I/O, daily weights, Low NA diet with FR  Monitor volume status   Transitioned to oral diuretics stable for discharge

## 2024-08-04 NOTE — ASSESSMENT & PLAN NOTE
Lab Results   Component Value Date    EGFR 36 08/04/2024    EGFR 35 08/03/2024    EGFR 33 08/02/2024    CREATININE 1.99 (H) 08/04/2024    CREATININE 2.04 (H) 08/03/2024    CREATININE 2.13 (H) 08/02/2024   Baseline around 1.5.-2.0  Notably, patient has previous labs with creatinine below his baseline.  These are associated with states of fluid overload.  Suspect his baseline is closer to 1.8-2.0  Avoid nephrotoxins/hypotension  BMP in AM

## 2024-08-04 NOTE — ASSESSMENT & PLAN NOTE
Lab Results   Component Value Date    HGBA1C 8.3 (H) 06/19/2024     (P) [id number] Lyrica for chronic neuropathy   Restart home Januvia  Continue Jardiance here for HF  Continue SSI coverage while inpatient   Monitor accuchecks and adjust regimen PRN

## 2024-08-05 ENCOUNTER — TELEPHONE (OUTPATIENT)
Dept: CARDIOLOGY CLINIC | Facility: CLINIC | Age: 59
End: 2024-08-05

## 2024-08-05 ENCOUNTER — TELEPHONE (OUTPATIENT)
Dept: FAMILY MEDICINE CLINIC | Facility: CLINIC | Age: 59
End: 2024-08-05

## 2024-08-05 ENCOUNTER — TRANSITIONAL CARE MANAGEMENT (OUTPATIENT)
Dept: FAMILY MEDICINE CLINIC | Facility: CLINIC | Age: 59
End: 2024-08-05

## 2024-08-05 ENCOUNTER — PATIENT OUTREACH (OUTPATIENT)
Dept: CASE MANAGEMENT | Facility: OTHER | Age: 59
End: 2024-08-05

## 2024-08-05 ENCOUNTER — TELEPHONE (OUTPATIENT)
Age: 59
End: 2024-08-05

## 2024-08-05 DIAGNOSIS — Z71.89 COORDINATION OF COMPLEX CARE: Primary | ICD-10-CM

## 2024-08-05 NOTE — PROGRESS NOTES
"Outpatient Care Management Note    HRR referral. Chart reviewed. Patient was hospitalized at [facility] 7/30/24-8/4/24 for acute on chronic CHF.  Patient presented with worsening dyspnea, lower extremity edema, and orthopnea.    RN CM placed outreach call and spoke with patient. Introduced self, role and reason for call.  Patient states that he is \"feeling really good right now\".      Patient confirms that he has a scale at home and monitors weight daily.  He reports that his weight today remains the same as it was at discharge 318 lbs.  He denies any shortness of breath or edema of extremities.  He states that \"they took care of all of that at the hospital\".    Weight rule reviewed with patient.  He verbalized understanding of need to notify physician for weight gain of 3 lbs overnight or 5 lbs in a week.  Patient confirmed awareness of Metolazone prescribed as needed for weight gain of 3+lbs in 24 hrs or 5+ lbs in one week or signs of worsening fluid retention- as listed on AVS instructions.    Patient states that he adheres to a low sodium diet and fluid restriction as instructed. He denies any dietary indiscretions.    Patient also confirmed that he monitors his blood sugars at home. He states that he was instructed by provider to monitor blood sugars three times a week. He states that blood sugars usually range between 138-160.    Patient confirmed that he received a copy of AVS instructions and medication list. Patient states that he is able to manage his medications without difficulty and states that he has a good understanding of his medications. He declined need to review medications with RN CM at this time.  Denies need for refills at this time.    Patient lives with wife and daughter and has good family support.  He states that he is independent with his ADL's. He is able to transport self to his appointments.    Reviewed upcoming appointments with patient.  He confirmed Cardiology appointment on 8/8/24 " and also has appointment with PCP on 8/8/24 as well.    RN CM explained care management program to patient.  Patient states that he feels that he is managing care well at this time and declined need for RN CM support at this time.  Patient is aware to reach out to PCP should his needs change and if he requires support at a future time.    RN CM will remove self from care team and close referral at this time.

## 2024-08-05 NOTE — TELEPHONE ENCOUNTER
Reviewed with patient his CardioMems report from yesterday & today, as well as hospital dc assessment.    Patient stated he feels great! Stated he lost 12 lbs of fluid while he was in the hospital. Is following all instructions very strictly.

## 2024-08-05 NOTE — TELEPHONE ENCOUNTER
Self-management/education and teach back:  Primary learner: Self  Following low sodium diet: Yes  Following fluid restriction: 2 ml/day  Hospital discharge weight: 318 lb 6.4 oz  Weighing daily: Yes  Recordinst home weight  318 lb       Weight today: 318 lb  Monitoring symptoms: Yes  Any current symptoms: No  Knows when to call provider: Yes  Medication reviewed and taking all as prescribed: Yes  Knows name of diuretic: Yes  Escalation plan:   HF education reviewed/reinforced including low sodium diet, 64 oz fluid restriction, activity, symptoms of decompensation and when and who to call. Yes reviewed & reinforced    Care Coordination:  Aware of cardiology follow up appointment: Yes- 24  Aware of PCP follow up appointment: Yes, Virtual visit 24  Transportation: Drives self  Social Support:  Insurance/financial concerns:  Home health care:   Health literacy:  Engagement:  Personal Goal:  Additional comments:

## 2024-08-05 NOTE — PROGRESS NOTES
Heart Failure Outpatient Progress Note - Mesfin Cano 59 y.o. male MRN: [id number]    @ Encounter: [id number]      Assessment/Plan:    Patient Active Problem List    Diagnosis Date Noted    Anemia 07/31/2024    Bilateral leg edema 07/02/2024    Abnormal stress test 06/26/2024    Chest pain 06/25/2024    Uncontrolled atrial flutter (HCC) 06/18/2024    Moderate persistent asthma without complication 06/18/2024    Status post cholecystectomy 02/17/2024    Diabetic neuropathy (HCC) 12/23/2023    Eosinophilia 10/18/2023    Anemia due to chronic kidney disease 10/16/2023    Nasal congestion 10/06/2023    Loose stools 04/17/2023    Acute on chronic diastolic congestive heart failure (HCC) 04/10/2023    Diabetic polyneuropathy associated with type 2 diabetes mellitus (HCC) 12/20/2022    CKD (chronic kidney disease) stage 3 (HCC) 09/16/2022    Presence of CardioMEMS HF system 03/09/2022    Paroxysmal atrial fibrillation (HCC) 03/01/2022    Hypokalemia 11/14/2021    HNP (herniated nucleus pulposus), lumbar 02/23/2021    Foraminal stenosis of lumbar region 02/23/2021    VICKY (obstructive sleep apnea)     Hyperlipidemia 04/18/2019    Primary osteoarthritis of both knees 06/14/2018    Irritable bowel syndrome with diarrhea 01/30/2018    Primary hypertension 01/30/2018    Vitamin B12 deficiency 03/08/2016    Vitamin D deficiency 03/08/2016    Morbid obesity (HCC) 02/23/2016    Primary osteoarthritis of right knee 10/15/2013     Chronic HFpEF, Stage C, NYHA III.    -PAD 17 mmhg per cardio mems transmission 8/7  -Will dose Metolazone 5 mg today  -1 week follow-up  -Weight at discharge 318, today, 328 lbs.      Volume management-home :  Bumex 6 mg PO TID, Jardiance 10 mg daily, Spironolactone 25 mg daily, Metolazone 5 mg PRN     Cardiomems Data: PAD Goal- 15 mmhg    7/21 - 20.  7/22 - 25.  7/23 - 21.  7/24 - 20.  7/25 - 18.  7/26 - 22.       7/27 - No Reading  7/28 - 20.  7/29 - 21.  7/30 - 18  Admit 7/30-8/4--->dry weight 318  8/4  - 14  8/5 - 13  8/6 - 16  8/7 - 17      NPI 6/25/24:  There is a left ventricular perfusion defect that is medium in size with moderate reduction in uptake present in the entire inferior and inferolateral location(s) that is partially reversible. The possibility of this defect being caused by diaphragmatic attenuation artifact cannot be completely excluded.      Echo 6/20/24 EF 55%, akinetic basal inferolateral and mid inferolateral      RHC with Cardiomems calibration:  CardioMEMS rep Maciel Shabazz was present throughout the case and provided the simultaneous MEMS interrogation data.     -140 during case  HR 74  O2 sat 97%  Hgb 10.5     RA 8  RV 30/8  PA 28/12, 17  PCWP 10-12 (multiple checks)  TPG 5  PA sat 59% (multiple checks)  Travis CO/CI: 6.6/2.3  TD CO/CI: 7.43/2.78     Simultaneous CardioMEMS data:  Mean: 26/10/15  End expiration on MEMS waveform: 28/10      Impression/Recommendations:  The patient tolerated the procedure well; no immediate complications.  Normal biv filling pressures (after inpt diuresis).  Normal CO/CI.  CardioMEMS data correlates to RHC.  s/p CardioMems implant 3/9/22, GOAL PAD 15-18  PAD 16 10/6, 14 10/5.      BNP 10/6/23: 93  122  233      Studies  LHC 9/6/22: No obstructive CAD  Pharm Nuc Stress 9/2/22: Abnormal study due to the presence of an inferior and inferolateral infarct without significant ischemia.  RHC 3/9/22:   RA 4   RV 35/4   PA 35/12/21   PCWP 10   PA sat 64%   Travis CO 5.56 L/min   Travis CI 2.2L/min/m2   PVR 1.97 SAGASTUME   TTE 4/11/2023: LVEF 50%.  Normal wall motion.  Grade 2 DD.  RV cavity size and systolic function normal.  LA mildly dilated.  Trace MR, TR.  Normal IVC.  TTE 03/25/2020: LVEF 40-45%. LVIDd 6.12 cm. Normal RV.    TTE 07/19/2021: LVEF 50%. LVIDd 6.13 cm. Grade 1 DD. Normal RV. Trace MR and TR. Mildly dilated IVC.    TTE 11/12/2021: LVEF 55%. LVIDd 6.0 cm. Grade 1 DD. Normal RV. Trace TR.       Syncope with fall  -Had recent  isolated episode at  home  -unconscious for a few seconds per patient report  -possibly 2/2 orthostatic hypotension ?  -Zio patch was placed but fell off     Atrial fibrillation/flutter with RVR   Currently maintaining SR  [id number] = 3 (HF, HTN, DM).   Anticoagulation on Eliquis.    Rate control: Metop succinate 50 mg daily   Rhythm control:S/P DCCV     Hypertension   Hyperlipidemia             Lab Results   Component Value Date     LDLCALC 86 10/06/2023    Rx: atorvastatin 10 mg daily.       Diabetes mellitus, type II     Non-insulin dependent.             Lab Results   Component Value Date     HGBA1C 8.3 (H) 06/19/2024      Obstructive sleep apnea  -Waiting for his equipment  Chronic respiratory failure   Asthma, moderate persistent    S/p gastric bypass (Samuel-en-Y)surgery    History of tobacco abuse   Carpal tunnel syndrome, bilateral    CKD, recent baseline 1.5-2.0. Stable.   Eosinophilia/anemia. Unclear etiology. For BMB in upcoming weeks. Follows with hem/onc.      HPI:  59 year old with PMH as above who presented to the ED today with complaints of worsening SOB, leg swelling and abdominal distension. CardioMems readings above goal since 7/21. He is already on high dose diuretics along with MRA and SGLT2i. He reports no significant dietary indiscretions. Has been using Metolazone more frequently but with inadequate response. Of note, recently admitted on  6/18/2024 due to 1 week history of chest pain and shortness of breath.  In the ER he was noted to have uncontrolled A-fib/flutter.  Underwent cardioversion after which symptoms resolved.  Patient continued with chest pain and stress test was performed which was abnormal as above, however general cardiology team felt results were similar compared to prior and recommended outpatient CTA.  Was euvolemic while admitted     Patient was aggressively diuresed with a Bumex gtt and transitioned to an oral regimen of 6 mg TID with PRN Metolazone 5 mg. Creatinine peaked around  2.    8/7/24. Presents for hospital follow up.  Tells me he has been feeling well since discharge home.  His weight has been stable on his home scale.  He is very upset that our scale is weighing him 10 pounds higher than his discharge weight.  Feels that this is incorrect.  We discussed dietary habits in detail today.  He assures me that he is not exceeding recommended sodium intake.  PA diastolic reading per cardio mems transmission on 8/7 was 17 mmHg with a goal of 15 mmHg    Past Medical History:   Diagnosis Date    Acute gastritis without hemorrhage     last assessed 3/24/17    Asthma     Atrial fibrillation (HCC)     Bilateral leg edema 02/19/2020    CHF (congestive heart failure) (HCC)     Coronary artery disease     Daytime sleepiness 07/17/2019    Diabetes mellitus (HCC)     Dyspnea on exertion 10/11/2021    Edema of both feet 01/23/2020    Gastric bypass status for obesity     Hyperlipidemia     Hypertension     Hypokalemia 11/14/2021    Impaired fasting glucose     last assessed 5/10/17    Knee sprain, bilateral 06/14/2018    Leg cramps 06/16/2022    Obesity     Viral gastroenteritis     last assessed 11/4/16       12 point ROS negative other than that stated in HPI    Allergies   Allergen Reactions    Azithromycin Other (See Comments)     Shaky, uneasy feeling     Bactrim [Sulfamethoxazole-Trimethoprim] Other (See Comments)     shakiness     .    Current Outpatient Medications:     Accu-Chek FastClix Lancets MISC, Test blood sugar twice daily (Patient taking differently: Takes blood sugar three times a week provider aware), Disp: 200 each, Rfl: 3    acetaminophen (TYLENOL) 325 mg tablet, Take 2 tablets (650 mg total) by mouth every 6 (six) hours as needed for mild pain, headaches or fever, Disp: , Rfl:     albuterol (PROVENTIL HFA,VENTOLIN HFA) 90 mcg/act inhaler, Inhale 2 puffs every 4 (four) hours as needed for wheezing or shortness of breath, Disp: 18 g, Rfl: 5    aluminum-magnesium hydroxide 200-200  MG/5ML suspension, Take 5 mL by mouth every 6 (six) hours as needed for heartburn, Disp: 355 mL, Rfl: 0    atorvastatin (LIPITOR) 10 mg tablet, TAKE 1 TABLET BY MOUTH EVERY DAY, Disp: 90 tablet, Rfl: 3    Blood Glucose Monitoring Suppl (Accu-Chek Guide) w/Device KIT, Use 2 (two) times a day Test blood sugar twice daily, Disp: 1 kit, Rfl: 0    budesonide-formoterol (Symbicort) 160-4.5 mcg/act inhaler, Inhale 2 puffs 2 (two) times a day Rinse mouth after use., Disp: 30.6 g, Rfl: 2    bumetanide (BUMEX) 2 mg tablet, Take 3 tablets (6 mg total) by mouth 3 (three) times a day, Disp: 270 tablet, Rfl: 0    Eliquis 5 MG, TAKE 1 TABLET BY MOUTH TWICE A DAY, Disp: 60 tablet, Rfl: 5    Empagliflozin (JARDIANCE) 10 MG TABS tablet, Take 1 tablet (10 mg total) by mouth daily, Disp: 30 tablet, Rfl: 11    EPINEPHrine (EPIPEN) 0.3 mg/0.3 mL SOAJ, Inject 0.3 mL (0.3 mg total) into a muscle once for 1 dose, Disp: 0.6 mL, Rfl: 0    famotidine (PEPCID) 20 mg tablet, Take 1 tablet (20 mg total) by mouth 2 (two) times a day (Patient taking differently: Take 20 mg by mouth if needed for heartburn As needed twice a day), Disp: 30 tablet, Rfl: 0    fluticasone (FLONASE) 50 mcg/act nasal spray, 1 spray into each nostril 2 (two) times a day, Disp: , Rfl: 0    Klor-Con M20 20 MEQ tablet, TAKE 2 TABLETS BY MOUTH 2 TIMES A DAY., Disp: 360 tablet, Rfl: 2    loratadine (CLARITIN) 10 mg tablet, Take 1 tablet (10 mg total) by mouth daily Do not start before October 13, 2023. (Patient taking differently: Take 10 mg by mouth if needed for allergies As needed daily), Disp: , Rfl: 0    metolazone (ZAROXOLYN) 2.5 mg tablet, Take 2 tablets (5 mg total) by mouth if needed (weight gain of 3+ lbs in 24 hours, 5+ lbs in one week or signs of worsening fluid retention) Take 20-30 minutes before Bumex dose and with additional potassium, Disp: , Rfl:     metoprolol succinate (TOPROL-XL) 50 mg 24 hr tablet, Take 1 tablet (50 mg total) by mouth daily, Disp: 30  tablet, Rfl: 5    montelukast (SINGULAIR) 10 mg tablet, Take 1 tablet (10 mg total) by mouth daily at bedtime, Disp: 90 tablet, Rfl: 1    nitroglycerin (NITROSTAT) 0.4 mg SL tablet, Place 1 tablet (0.4 mg total) under the tongue every 5 (five) minutes as needed for chest pain for up to 50 doses, Disp: 50 tablet, Rfl: 0    omeprazole (PriLOSEC) 20 mg delayed release capsule, Take 1 capsule (20 mg total) by mouth daily for 14 days (Patient taking differently: Take 20 mg by mouth if needed (acid reflux)), Disp: 14 capsule, Rfl: 0    pregabalin (LYRICA) 50 mg capsule, Take 1 caps. at bedtime, Disp: 30 capsule, Rfl: 5    sitaGLIPtin (Januvia) 100 mg tablet, TAKE 1/2 TABLET BY MOUTH EVERY DAY, Disp: 15 tablet, Rfl: 5    spironolactone (ALDACTONE) 25 mg tablet, TAKE 1 TABLET (25 MG TOTAL) BY MOUTH DAILY., Disp: 90 tablet, Rfl: 1    tiotropium (Spiriva Respimat) 1.25 MCG/ACT AERS inhaler, Inhale 2 puffs daily, Disp: 4 g, Rfl: 0  No current facility-administered medications for this visit.    Social History     Socioeconomic History    Marital status: /Civil Union     Spouse name: Not on file    Number of children: Not on file    Years of education: Not on file    Highest education level: Not on file   Occupational History    Not on file   Tobacco Use    Smoking status: Former     Current packs/day: 1.00     Average packs/day: 1 pack/day for 5.0 years (5.0 ttl pk-yrs)     Types: Pipe, Cigars, Cigarettes     Start date: 2016     Quit date: 1/1/2021     Passive exposure: Never    Smokeless tobacco: Former    Tobacco comments:     cigar once a day   Vaping Use    Vaping status: Never Used   Substance and Sexual Activity    Alcohol use: Yes     Alcohol/week: 2.0 standard drinks of alcohol     Types: 2 Shots of liquor per week     Comment: social    Drug use: No    Sexual activity: Yes     Partners: Female     Birth control/protection: None   Other Topics Concern    Not on file   Social History Narrative    Not on file  "    Social Determinants of Health     Financial Resource Strain: Not on file   Food Insecurity: Food Insecurity Present (8/1/2024)    Hunger Vital Sign     Worried About Running Out of Food in the Last Year: Sometimes true     Ran Out of Food in the Last Year: Never true   Transportation Needs: No Transportation Needs (8/1/2024)    PRAPARE - Transportation     Lack of Transportation (Medical): No     Lack of Transportation (Non-Medical): No   Physical Activity: Not on file   Stress: Not on file   Social Connections: Not on file   Intimate Partner Violence: Not on file   Housing Stability: Low Risk  (8/1/2024)    Housing Stability Vital Sign     Unable to Pay for Housing in the Last Year: No     Number of Times Moved in the Last Year: 0     Homeless in the Last Year: No       Family History   Problem Relation Age of Onset    Stroke Mother     Hypertension Father     Cancer Father     COPD Father        Physical Exam:    Vitals: /70 (BP Location: Left arm, Patient Position: Sitting, Cuff Size: Large)   Pulse 86   Ht 6' 1\" (1.854 m)   Wt (!) 149 kg (328 lb 3.2 oz)   SpO2 97%   BMI 43.30 kg/m²     Wt Readings from Last 3 Encounters:   08/04/24 (!) 144 kg (318 lb 6.4 oz)   07/19/24 (!) 150 kg (330 lb)   06/26/24 (!) 149 kg (328 lb 3.2 oz)         GEN: Mesfin Cano appears well, alert and oriented x 3, pleasant and cooperative   HEENT: pupils equal, round, and reactive to light; extraocular muscles intact  NECK: supple, no carotid bruits   HEART: regular rhythm, normal S1 and S2, no murmurs, clicks, gallops or rubs, JVP is at mid neck  LUNGS: clear to auscultation bilaterally; no wheezes, rales, or rhonchi   ABDOMEN: normal bowel sounds, soft, no tenderness, no distention  EXTREMITIES: peripheral pulses normal; no clubbing, cyanosis, or edema  NEURO: no focal findings   SKIN: normal without suspicious lesions on exposed skin    Labs & Results:  Lab Results   Component Value Date    SODIUM 137 08/07/2024    K " 3.8 08/07/2024     08/07/2024    CO2 27 08/07/2024    AGAP 10 08/07/2024    BUN 33 (H) 08/07/2024    CREATININE 1.56 (H) 08/07/2024    GLUC 161 (H) 08/04/2024    GLUF 149 (H) 08/07/2024    CALCIUM 8.0 (L) 08/07/2024    AST 9 (L) 07/30/2024    ALT 6 (L) 07/30/2024    ALKPHOS 95 07/30/2024    TP 6.3 (L) 07/30/2024    TBILI 0.40 07/30/2024    EGFR 48 08/07/2024     Lab Results   Component Value Date    WBC 7.85 08/04/2024    HGB 10.7 (L) 08/04/2024    HCT 35.1 (L) 08/04/2024    MCV 83 08/04/2024     08/04/2024     Lab Results   Component Value Date     (H) 08/07/2024      Lab Results   Component Value Date    LDLCALC 86 10/06/2023     Lab Results   Component Value Date    QTQ0GUERFTKH 2.896 06/18/2024     Lab Results   Component Value Date    HGBA1C 8.3 (H) 06/19/2024         EKG personally reviewed by RODRIGUE Rodriges.   No results found for this visit on 08/08/24.     Counseling / Coordination of Care  Total face to face time spent with patient 20 minutes.  An additional 10 minutes was spent for chart/data review and visit preparation.       Thank you for the opportunity to participate in the care of this patient.    RODRIGUE Rodriges

## 2024-08-05 NOTE — UTILIZATION REVIEW
NOTIFICATION OF ADMISSION DISCHARGE   This is a Notification of Discharge from [facility]. Please be advised that this patient has been discharge from our facility. Below you will find the admission and discharge date and time including the patient’s disposition.   UTILIZATION REVIEW CONTACT:  Bayron Candelaria  Utilization   Network Utilization Review Department  Phone: [phone] x carefully listen to the prompts. All voicemails are confidential.  Email: [email or web address]     ADMISSION INFORMATION  PRESENTATION DATE: 7/30/2024  1:33 PM  OBERVATION ADMISSION DATE: N/A  INPATIENT ADMISSION DATE: 7/30/24  5:02 PM   DISCHARGE DATE: 8/4/2024  4:35 PM   DISPOSITION:Home/Self Care    Network Utilization Review Department  ATTENTION: Please call with any questions or concerns to [phone] and carefully listen to the prompts so that you are directed to the right person. All voicemails are confidential.   For Discharge needs, contact Care Management DC Support Team at [phone] opt. 2  Send all requests for admission clinical reviews, approved or denied determinations and any other requests to dedicated fax number below belonging to the campus where the patient is receiving treatment. List of dedicated fax numbers for the Facilities:  FACILITY NAME UR FAX NUMBER   ADMISSION DENIALS (Administrative/Medical Necessity) [phone]   DISCHARGE SUPPORT TEAM ([facility]) [phone]   PARENT CHILD HEALTH (Maternity/NICU/Pediatrics) [phone]   [facility] [phone]   [facility] [phone]   [facility] [phone]   [facility] [phone]   [facility] [phone]   General acute hospital [phone]   [facility] [phone]   [facility] [phone]   [facility]  [facility] [phone]   Critical access hospital [phone]   [facility] [phone]   [facility] [phone]

## 2024-08-05 NOTE — TELEPHONE ENCOUNTER
----- Message from Cookie ISAAC sent at 8/5/2024 10:00 AM EDT -----  Due to high risk pt needs to bwe schduled within the the next 3-5 days and not the 14 days.  ----- Message -----  From: Thais Chou  Sent: 8/5/2024   8:05 AM EDT  To: [facility] Clinical    Patient is being discharged from their inpatient hospitalization today. Patients unplanned readmission score is red or yellow (meaning that they are at high risk of readmission) and require a TCM appointment within 3-5 days of discharge. Please contact this patient to schedule appointment.    Thank you    Inpatient Care Management

## 2024-08-07 ENCOUNTER — TELEPHONE (OUTPATIENT)
Dept: CARDIOLOGY CLINIC | Facility: CLINIC | Age: 59
End: 2024-08-07

## 2024-08-07 ENCOUNTER — APPOINTMENT (OUTPATIENT)
Dept: LAB | Facility: CLINIC | Age: 59
End: 2024-08-07
Payer: COMMERCIAL

## 2024-08-07 DIAGNOSIS — I50.33 ACUTE ON CHRONIC DIASTOLIC CONGESTIVE HEART FAILURE (HCC): ICD-10-CM

## 2024-08-07 DIAGNOSIS — R07.9 CHEST PAIN, UNSPECIFIED TYPE: ICD-10-CM

## 2024-08-07 DIAGNOSIS — I50.30 DIASTOLIC CONGESTIVE HEART FAILURE, UNSPECIFIED HF CHRONICITY (HCC): ICD-10-CM

## 2024-08-07 DIAGNOSIS — R55 SYNCOPE, UNSPECIFIED SYNCOPE TYPE: ICD-10-CM

## 2024-08-07 LAB
ANION GAP SERPL CALCULATED.3IONS-SCNC: 10 MMOL/L (ref 4–13)
BNP SERPL-MCNC: 263 PG/ML (ref 0–100)
BUN SERPL-MCNC: 33 MG/DL (ref 5–25)
CALCIUM SERPL-MCNC: 8 MG/DL (ref 8.4–10.2)
CHLORIDE SERPL-SCNC: 100 MMOL/L (ref 96–108)
CO2 SERPL-SCNC: 27 MMOL/L (ref 21–32)
CREAT SERPL-MCNC: 1.56 MG/DL (ref 0.6–1.3)
GFR SERPL CREATININE-BSD FRML MDRD: 48 ML/MIN/1.73SQ M
GLUCOSE P FAST SERPL-MCNC: 149 MG/DL (ref 65–99)
POTASSIUM SERPL-SCNC: 3.8 MMOL/L (ref 3.5–5.3)
SODIUM SERPL-SCNC: 137 MMOL/L (ref 135–147)

## 2024-08-07 PROCEDURE — 83880 ASSAY OF NATRIURETIC PEPTIDE: CPT

## 2024-08-07 NOTE — TELEPHONE ENCOUNTER
----- Message from RODRIGUE David sent at 8/7/2024 11:41 AM EDT -----  Hello,    Can one of you please send me the most updated CardioMEMS transmission numbers on Mesfin?  I will be seeing him tomorrow and just want to have a baseline after being aggressively diuresed in the  hospital.    Thank you  Capri

## 2024-08-07 NOTE — TELEPHONE ENCOUNTER
Patient CardioMemes Readings: Goal -15  8/4 - 14  8/5 - 13  8/6 - 16  8/7 - 17    F/U call to patient who stated he is feeling great. Stated he does experience baseline SOB. Denies edema.      Today's Wt - 318 lb.    Following 2g sodium diet & 1800 ml of fluid/day.  Taking all cardiac medications as ordered.    Stated he got his lab work drawn today & discussed his OV tomorrow with Capri.

## 2024-08-08 ENCOUNTER — OFFICE VISIT (OUTPATIENT)
Dept: CARDIOLOGY CLINIC | Facility: CLINIC | Age: 59
End: 2024-08-08
Payer: COMMERCIAL

## 2024-08-08 ENCOUNTER — TELEMEDICINE (OUTPATIENT)
Dept: FAMILY MEDICINE CLINIC | Facility: CLINIC | Age: 59
End: 2024-08-08
Payer: COMMERCIAL

## 2024-08-08 VITALS
BODY MASS INDEX: 41.75 KG/M2 | WEIGHT: 315 LBS | HEART RATE: 75 BPM | SYSTOLIC BLOOD PRESSURE: 137 MMHG | HEIGHT: 73 IN | DIASTOLIC BLOOD PRESSURE: 67 MMHG

## 2024-08-08 VITALS
HEART RATE: 86 BPM | SYSTOLIC BLOOD PRESSURE: 128 MMHG | DIASTOLIC BLOOD PRESSURE: 70 MMHG | HEIGHT: 73 IN | BODY MASS INDEX: 41.75 KG/M2 | WEIGHT: 315 LBS | OXYGEN SATURATION: 97 %

## 2024-08-08 DIAGNOSIS — E78.2 MIXED HYPERLIPIDEMIA: Chronic | ICD-10-CM

## 2024-08-08 DIAGNOSIS — D63.1 ANEMIA DUE TO STAGE 3A CHRONIC KIDNEY DISEASE  (HCC): ICD-10-CM

## 2024-08-08 DIAGNOSIS — E11.42 DIABETIC POLYNEUROPATHY ASSOCIATED WITH TYPE 2 DIABETES MELLITUS (HCC): ICD-10-CM

## 2024-08-08 DIAGNOSIS — I10 PRIMARY HYPERTENSION: ICD-10-CM

## 2024-08-08 DIAGNOSIS — G47.33 OSA (OBSTRUCTIVE SLEEP APNEA): Chronic | ICD-10-CM

## 2024-08-08 DIAGNOSIS — I50.33 ACUTE ON CHRONIC DIASTOLIC CONGESTIVE HEART FAILURE (HCC): Primary | ICD-10-CM

## 2024-08-08 DIAGNOSIS — N18.31 ANEMIA DUE TO STAGE 3A CHRONIC KIDNEY DISEASE  (HCC): ICD-10-CM

## 2024-08-08 DIAGNOSIS — I50.32 CHRONIC HEART FAILURE WITH PRESERVED EJECTION FRACTION (HCC): ICD-10-CM

## 2024-08-08 DIAGNOSIS — N18.31 STAGE 3A CHRONIC KIDNEY DISEASE (HCC): ICD-10-CM

## 2024-08-08 DIAGNOSIS — E66.01 MORBID OBESITY DUE TO EXCESS CALORIES (HCC): Chronic | ICD-10-CM

## 2024-08-08 DIAGNOSIS — I48.0 PAROXYSMAL ATRIAL FIBRILLATION (HCC): Chronic | ICD-10-CM

## 2024-08-08 DIAGNOSIS — J45.40 MODERATE PERSISTENT ASTHMA WITHOUT COMPLICATION: ICD-10-CM

## 2024-08-08 PROCEDURE — 99496 TRANSJ CARE MGMT HIGH F2F 7D: CPT | Performed by: FAMILY MEDICINE

## 2024-08-08 PROCEDURE — 99214 OFFICE O/P EST MOD 30 MIN: CPT | Performed by: NURSE PRACTITIONER

## 2024-08-08 RX ORDER — METOLAZONE 2.5 MG/1
5 TABLET ORAL AS NEEDED
Qty: 30 TABLET | Refills: 2 | Status: SHIPPED | OUTPATIENT
Start: 2024-08-08 | End: 2024-08-18

## 2024-08-08 NOTE — PROGRESS NOTES
Virtual TCM Visit:    Chief Complaint   Patient presents with    Virtual Tcm    Virtual Tcm     Health Maintenance   Topic Date Due    Pneumococcal Vaccine: Pediatrics (0 to 5 Years) and At-Risk Patients (6 to 64 Years) (1 of 2 - PCV) Never done    HIV Screening  Never done    Hepatitis A Vaccine (1 of 2 - Risk 2-dose series) Never done    Colorectal Cancer Screening  Never done    Zoster Vaccine (1 of 2) Never done    DM Eye Exam  11/04/2022    Annual Physical  06/16/2023    COVID-19 Vaccine (3 - 2023-24 season) 09/01/2023    Kidney Health Evaluation: Albumin/Creatinine Ratio  09/19/2023    Diabetic Foot Exam  12/20/2023    Influenza Vaccine (1) 09/01/2024    HEMOGLOBIN A1C  12/19/2024    Depression Screening  02/20/2025    Kidney Health Evaluation: GFR  08/07/2025    RSV Vaccine Age 60+ Years (1 - 1-dose 60+ series) 05/19/2027    DTaP,Tdap,and Td Vaccines (2 - Td or Tdap) 06/09/2028    Hepatitis C Screening  Completed    RSV Vaccine age 0-20 Months  Aged Out    HIB Vaccine  Aged Out    IPV Vaccine  Aged Out    Meningococcal ACWY Vaccine  Aged Out    HPV Vaccine  Aged Out       Verification of patient location:    Patient is located at Home in the following state in which I hold an active license [place]    Assessment & Plan   1. Acute on chronic diastolic congestive heart failure (HCC)  Assessment & Plan:  Wt Readings from Last 3 Encounters:   08/08/24 (!) 145 kg (319 lb)   08/08/24 (!) 149 kg (328 lb 3.2 oz)   08/04/24 (!) 144 kg (318 lb 6.4 oz)     Patient reports improvement in shortness of breath and BL leg edema.    He was seen by cardiology CRNP today.    Continue current medical regimen.    Follow-up with cardiology Dr. Wilcox, on 8/20/24.  Follow-up with heart failure clinic weekly.        2. Paroxysmal atrial fibrillation (HCC)  Assessment & Plan:  Patient denies heart palpitations.    Continue Metoprolol ER 50 mg 1 tablet daily, continue anticoagulation with Eliquis 5 mg twice daily.    Follow-up with  cardiology Dr. Wilcox.  3. Primary hypertension  Assessment & Plan:  Patient was seen by cardiology CRNP today.  Blood pressure was 128/70.    Continue current medical therapy.    Follow a low-sodium diet.  Monitor blood pressure at home.  4. Diabetic polyneuropathy associated with type 2 diabetes mellitus (HCC)  Assessment & Plan:    Lab Results   Component Value Date    HGBA1C 8.3 (H) 06/19/2024     Hb A1C is above goal.  Continue Januvia 100 mg -1/2 tab. daily, Jardiance 10 mg daily, Lyrica 50 mg at bedtime.  Recommended to discuss with cardiology Dr. Wilcox increasing dose of Jardiance in view of heart failure to 25 mg daily which will also help to improve blood sugar control.    Continue to monitor blood sugar at home daily.    Follow a low-carb diet, work on weight reduction.      Check eye exam by ophthalmology annually.  5. Stage 3a chronic kidney disease (HCC)  Assessment & Plan:  Lab Results   Component Value Date    EGFR 48 08/07/2024    EGFR 36 08/04/2024    EGFR 35 08/03/2024    CREATININE 1.56 (H) 08/07/2024    CREATININE 1.99 (H) 08/04/2024    CREATININE 2.04 (H) 08/03/2024     Recommended to avoid NSAIDs, nephrotoxins.    Schedule follow-up visit with t nephrology Dr. Vyas.  6. Morbid obesity (HCC)  Assessment & Plan:  Recommended to work on dietary and lifestyle modifications, losing weight.  7. Hyperlipidemia  Assessment & Plan:  Continue atorvastatin 10 mg daily.  8. VICKY (obstructive sleep apnea)  Assessment & Plan:  Patient is awaiting for a call from sleep medicine to arrange sleep study.  9. Moderate persistent asthma without complication  Assessment & Plan:  Symptoms are stable.  No recent asthma exacerbation.    Continue to use Symbicort, Spiriva Respimat inhaler.    Take Singulair 10 mg at bedtime.    Use Albuterol via nebulizer PRN.    10. Anemia due to stage 3a chronic kidney disease  (HCC)  Assessment & Plan:  Blood work done on August 4, 2024.  Hb 10.7 - stable.    Follow-up with  hematology, nephrology.         Encounter provider Soo Chavez MD     Provider located at [address] [id number]    After connecting through [employer], the patient was identified by name and date of birth. Mesfin Cano was informed that this is a telemedicine visit and that the visit is being conducted through the Epic Embedded platform. He agrees to proceed..  My office door was closed. No one else was in the room.  He acknowledged consent and understanding of privacy and security of the video platform. The patient has agreed to participate and understands they can discontinue the visit at any time.    Patient is aware this is a billable service.    History of Present Illness     Transitional Care Management Review:   Mesfin Cano is a 59 y.o. male here for TCM follow up.    During the TCM phone call patient stated:  TCM Call       Date and time call was made  8/5/2024 10:04 AM    Hospital care reviewed  Records reviewed    Patient was hospitialized at  [facility]    Date of Admission  07/30/24    Date of discharge  08/04/24    Diagnosis  Uncontrolled atrial flutter (HCC)    Disposition  Home    Were the patients medications reviewed and updated  No    Current Symptoms  None    Shortness of breath severity  Moderate          TCM Call       Post hospital issues  None    Should patient be enrolled in anticoag monitoring?  No    Scheduled for follow up?  Yes    Patients specialists  Cardiologist    Cardiologist name  Bobby Betancur MD    Cardiologist contact #  [phone]    Did you obtain your prescribed medications  Yes    Do you need help managing your prescriptions or medications  No    Is transportation to your appointment needed  No    I have advised the patient to call PCP with any new or worsening symptoms  Cookie ISAAC, medical assitant    Living Arrangements  Family members    Support System  Children    The type  of support provided  Physical; Financial; Emotional    Do you have social support  Yes, as much as I need    Are you recieving any outpatient services  No    Are you recieving home care services  No    Types of home care services  Nurse visit; Home PT; Home health aid    Are you using any community resources  No    Current waiver services  No    Have you fallen in the last 12 months  No    Interperter language line needed  No    Counseling  Patient          HPI    Patient presents for virtual TCM visit.    He was admitted to [facility] 7/30/24 - 8/4/2024 for acute on chronic congestive heart failure.    Reviewed hospital records, test results, discharge medications with patient.    Patient presented to the hospital with worsening shortness of breath  and leg edema, orthopnea.    He was found to be in heart failure exacerbation with elevated BNP and 3+ pitting edema.  He was followed by cardiology heart failure team.    Received IV diuresis with Bumex drip.    Patient reports improvement in shortness of breath, leg edema since hospital discharge.  He was seen by cardiology CRNP today.    Blood pressure was 128/70.    Patient takes all medication as prescribed.    Denies chest pain, dizziness, heart palpitations.    Type 2 DM - currently taking Januvia 100 mg -1/2 tab daily, Jardiance 10 mg daily.    Reports no hypoglycemic episodes.    Fasting blood sugar ranges 146-150's.    Hyperlipidemia - on atorvastatin 10 mg daily.    CKD stage 3 - previously followed by nephrology Dr. Vyas.      Blood test done on August 7, 2024.  Creatinine 1.56, GFR 48, potassium 3.8, fasting blood sugar 149.  .     Moderate persistent asthma - symptoms are stable.    Denies cough.  Continues using Symbicort 160/4.5 mcg 2 puffs twice daily, Spiriva Respimat inhaler daily.    Review of Systems   Constitutional:  Positive for fatigue. Negative for activity change, appetite change, chills and fever.   HENT:  Negative for congestion, ear  "pain and sore throat.    Eyes: Negative.    Respiratory:  Positive for shortness of breath. Negative for cough (improved), chest tightness and wheezing.    Cardiovascular:  Positive for leg swelling. Negative for chest pain (improved) and palpitations.   Gastrointestinal:  Negative for abdominal pain, blood in stool, constipation, diarrhea, nausea and vomiting.   Genitourinary:  Positive for frequency. Negative for difficulty urinating, dysuria and hematuria.   Musculoskeletal:  Positive for arthralgias. Negative for back pain and joint swelling.   Skin:  Negative for rash.   Neurological:  Positive for numbness (in feet). Negative for dizziness, syncope and headaches.   Hematological: Negative.    Psychiatric/Behavioral:  Negative for dysphoric mood and sleep disturbance. The patient is not nervous/anxious.      Objective    /67   Pulse 75   Ht 6' 1\" (1.854 m)   Wt (!) 145 kg (319 lb)   BMI 42.09 kg/m²     Physical Exam  Vitals and nursing note reviewed.   Constitutional:       Appearance: Normal appearance.      Comments: Morbidly obese   HENT:      Head: Normocephalic and atraumatic.   Eyes:      Conjunctiva/sclera: Conjunctivae normal.      Pupils: Pupils are equal, round, and reactive to light.   Cardiovascular:      Comments: No chest pain   Pulmonary:      Effort: Pulmonary effort is normal.      Breath sounds: No wheezing.   Abdominal:      General: There is no distension.   Musculoskeletal:      Right lower leg: No edema.      Left lower leg: No edema.   Skin:     Findings: No rash.   Neurological:      Mental Status: He is alert.   Psychiatric:         Mood and Affect: Mood normal.       Medications have been reviewed by provider in current encounter      Soo Chavez MD      VIRTUAL VISIT DISCLAIMER    Mesfin Cano verbally agrees to participate in Virtual Care Services. Pt is aware that Virtual Care Services could be limited without vital signs or the ability to perform a full hands-on " physical exam. Mesfin Cano understands he or the provider may request at any time to terminate the video visit and request the patient to seek care or treatment in person.

## 2024-08-08 NOTE — PATIENT INSTRUCTIONS
Weigh yourself daily  If you gain 3 lbs in one day or 5 lbs in one week, please call the office at [phone] and ask for a nurse or the heart failure nurse  Keep your sodium intake to <2 grams, (2000 mg) per day, and fluids <2 Liters (2000 ml) per day. This is around 6-7, 8 oz glasses of fluid per day    Take a Metolazone today with extra dose of Potassium

## 2024-08-09 NOTE — ASSESSMENT & PLAN NOTE
Patient denies heart palpitations.    Continue Metoprolol ER 50 mg 1 tablet daily, continue anticoagulation with Eliquis 5 mg twice daily.    Follow-up with cardiology Dr. Wilcox.

## 2024-08-09 NOTE — ASSESSMENT & PLAN NOTE
Wt Readings from Last 3 Encounters:   08/08/24 (!) 145 kg (319 lb)   08/08/24 (!) 149 kg (328 lb 3.2 oz)   08/04/24 (!) 144 kg (318 lb 6.4 oz)     Patient reports improvement in shortness of breath and BL leg edema.    He was seen by cardiology CRNP today.    Continue current medical regimen.    Follow-up with cardiology Dr. Wilcox, on 8/20/24.  Follow-up with heart failure clinic weekly.

## 2024-08-09 NOTE — ASSESSMENT & PLAN NOTE
Lab Results   Component Value Date    EGFR 48 08/07/2024    EGFR 36 08/04/2024    EGFR 35 08/03/2024    CREATININE 1.56 (H) 08/07/2024    CREATININE 1.99 (H) 08/04/2024    CREATININE 2.04 (H) 08/03/2024     Recommended to avoid NSAIDs, nephrotoxins.    Schedule follow-up visit with t nephrology Dr. Vyas.

## 2024-08-09 NOTE — ASSESSMENT & PLAN NOTE
Patient was seen by cardiology CRNP today.  Blood pressure was 128/70.    Continue current medical therapy.    Follow a low-sodium diet.  Monitor blood pressure at home.

## 2024-08-09 NOTE — ASSESSMENT & PLAN NOTE
Symptoms are stable.  No recent asthma exacerbation.    Continue to use Symbicort, Spiriva Respimat inhaler.    Take Singulair 10 mg at bedtime.    Use Albuterol via nebulizer PRN.

## 2024-08-09 NOTE — ASSESSMENT & PLAN NOTE
Lab Results   Component Value Date    HGBA1C 8.3 (H) 06/19/2024     Hb A1C is above goal.  Continue Januvia 100 mg -1/2 tab. daily, Jardiance 10 mg daily, Lyrica 50 mg at bedtime.  Recommended to discuss with cardiology Dr. Wilcox increasing dose of Jardiance in view of heart failure to 25 mg daily which will also help to improve blood sugar control.    Continue to monitor blood sugar at home daily.    Follow a low-carb diet, work on weight reduction.      Check eye exam by ophthalmology annually.

## 2024-08-12 ENCOUNTER — TELEPHONE (OUTPATIENT)
Dept: CARDIOLOGY CLINIC | Facility: CLINIC | Age: 59
End: 2024-08-12

## 2024-08-12 NOTE — TELEPHONE ENCOUNTER
F/U Nurse call to patient who stated he is doing really good. Stated he is following a   <2 g sodium diet & <2 liters of fluid/day.  Taking all cardiac medications as ordered.    Denied any edema to lower extremities or abdominal bloating. Denied chest pain or discomfort.    Stated he is experiencing baseline SOB.    CardioMem's Readings. Today - 15.  8/11 - 13 8/9 - 17 8/8 - 18

## 2024-08-15 ENCOUNTER — TELEPHONE (OUTPATIENT)
Dept: CARDIOLOGY CLINIC | Facility: CLINIC | Age: 59
End: 2024-08-15

## 2024-08-15 NOTE — TELEPHONE ENCOUNTER
Patient called @ 8:30 to cancel today's 8:30 AM scheduled OV with Aster García PA-C, stating he was on his way to the appointment & had a bowel accident & had to drive back home to shower, etc.    Patient stated he is feeling well & has an OV scheduled with Dr Wilcox on 8/20.    Secure chatted Aster García PA-C to inform her of cancellation & canceled appointment in the schedule.

## 2024-08-18 DIAGNOSIS — E11.42 DIABETIC POLYNEUROPATHY ASSOCIATED WITH TYPE 2 DIABETES MELLITUS (HCC): ICD-10-CM

## 2024-08-18 DIAGNOSIS — I50.32 CHRONIC HEART FAILURE WITH PRESERVED EJECTION FRACTION (HCC): ICD-10-CM

## 2024-08-18 RX ORDER — METOLAZONE 2.5 MG/1
5 TABLET ORAL AS NEEDED
Qty: 180 TABLET | Refills: 1 | Status: SHIPPED | OUTPATIENT
Start: 2024-08-18

## 2024-08-19 RX ORDER — PREGABALIN 50 MG/1
CAPSULE ORAL
Qty: 30 CAPSULE | Refills: 5 | Status: SHIPPED | OUTPATIENT
Start: 2024-08-19

## 2024-08-19 NOTE — PROGRESS NOTES
Advanced Heart Failure Outpatient Progress Note - Mesfin Cano 59 y.o. male MRN: [id number]    Encounter: [id number]      Assessment/Plan:    Patient Active Problem List    Diagnosis Date Noted    Anemia 07/31/2024    Bilateral leg edema 07/02/2024    Abnormal stress test 06/26/2024    Uncontrolled atrial flutter (HCC) 06/18/2024    Moderate persistent asthma without complication 06/18/2024    Status post cholecystectomy 02/17/2024    Diabetic neuropathy (HCC) 12/23/2023    Eosinophilia 10/18/2023    Anemia due to chronic kidney disease 10/16/2023    Nasal congestion 10/06/2023    Loose stools 04/17/2023    Acute on chronic diastolic congestive heart failure (HCC) 04/10/2023    Diabetic polyneuropathy associated with type 2 diabetes mellitus (HCC) 12/20/2022    Stage 3a chronic kidney disease (HCC) 09/16/2022    Presence of CardioMEMS HF system 03/09/2022    Paroxysmal atrial fibrillation (HCC) 03/01/2022    Hypokalemia 11/14/2021    HNP (herniated nucleus pulposus), lumbar 02/23/2021    Foraminal stenosis of lumbar region 02/23/2021    VICKY (obstructive sleep apnea)     Hyperlipidemia 04/18/2019    Primary osteoarthritis of both knees 06/14/2018    Irritable bowel syndrome with diarrhea 01/30/2018    Primary hypertension 01/30/2018    Vitamin B12 deficiency 03/08/2016    Vitamin D deficiency 03/08/2016    Morbid obesity (HCC) 02/23/2016    Primary osteoarthritis of right knee 10/15/2013     # Chronic HFpEF, Stage C, NYHA III   History of cardiomyopathy with mid range EF in 2020.  Euvolemic on exam  PAD 14 on CardioMems today     Weight:  334 lbs 1/30/24; 325 lbs 7/31/24; 318 lbs on discharge 8/4/24; 319 lbs 8/8/24; 317 lbs 8/20/24  BNP: 165 2/4/24; 267 7/30/24; 263 8/7/24        Studies- personally reviewed by me       Pharm Nuc Stress 6/25/24: Medium to large size partially reversible inferior/inferolateral perfusion defect concerning for inferior ischemia with inferolateral infarct v diaphragmatic attenuation  artifact. Prone imaging could not be performed to further clarify this defect.   -similar to prior     RHC 10/11/23: CardioMEMS data correlates to RHC.   -140 during case  HR 74  O2 sat 97%  Hgb 10.5     RA 8  RV 30/8  PA 28/12, 17  PCWP 10-12 (multiple checks)  TPG 5  PA sat 59% (multiple checks)  Travis CO/CI: 6.6/2.3  TD CO/CI: 7.43/2.78     Simultaneous CardioMEMS data:  Mean: 26/10/15  End expiration on MEMS waveform: 28/10     LHC 9/6/22: No obstructive CAD     Pharm Nuc Stress 9/2/22: Abnormal study due to the presence of an inferior and inferolateral infarct without significant ischemia.     RHC 3/9/22:   RA 4   RV 35/4   PA 35/12/21   PCWP 10   PA sat 64%   Travis CO 5.56 L/min   Travis CI 2.2L/min/m2   PVR 1.97 SAGASTUME      TTE 03/25/2020: LVEF 40-45%. LVIDd 6.12 cm. Normal RV.    TTE 07/19/2021: LVEF 50%. LVIDd 6.13 cm. Grade 1 DD. Normal RV. Trace MR and TR. Mildly dilated IVC.    TTE 11/12/2021: LVEF 55%. LVIDd 6.0 cm. Grade 1 DD. Normal RV. Trace TR.       Neurohormonal Blockade:   --Beta Blocker: metoprolol succinate 50 mg daily.    --ARNi / ACEi / ARB:   --Aldosterone Antagonist: spironolactone 25mg daily  --SGLT2 Inhibitor: Jardiance 10mg daily  --Diuretic: bumex 6mg TID with potassium 40meq BID; prn metolazone 5mg    -  Sudden Cardiac Death Risk Reduction:   --LVEF >35%.       Electrical Resynchronization:   --Candidacy for BiV device: narrow QRS.        Advanced Therapies:   Will continue to monitor. LVEF recovered      # Atrial fibrillation/flutter  [id number] = 3 (HF, HTN, DM).   Diagnosed 02/2022.   Anticoagulation with Eliquis.    Rate control: BB as above.   Rhythm control: s/p DCCV     # Hypertension, controlled  Continue GDMT      # Hyperlipidemia   10/6/23:  HDL 55 LDL 86  Rx: atorvastatin 10 mg daily.        # Diabetes mellitus, type II     Non-insulin dependent.   HbA1c 6/19/24: 8.3      # Obstructive sleep apnea, Not currently using CPAP given machine recall. Needs repeat sleep study  "  # Chronic respiratory failure   # Asthma, severe persistent ,  follows with Dr. Noonan as outpatient.   # H/o gastric bypass (Samuel-en-Y)surgery    # History of tobacco abuse   # CKD 3b, recent baseline 1.5-2, 1.56 8/7/24  # s/p CardioMems implant 3/9/22   PAD 14 today    TODAY'S PLAN:  No medication changes today  BMP in 2 weeks  Daily CardioMems transmissions  2g sodium diet  2L fluid restriction  Daily weights, call office for weight gain of 3 lbs in a day or 5 lbs in 5-7 days.  Sleep study per PCP        HPI:   Mesfin Cano is a 59 y.o. man with a PMH as above who presents to the office for follow-up.     Admitted to [facility] from 12/23 to 01/01/2022 after presenting with worsening SOB. Started on IV Lasix (later switched to IV Bumex) and IV steroids. Did receive 2 doses of metolazone while inpatient. Transitioned to PO torsemide on day of discharge. Lost 12 lbs and net negative 15 L this admission.      Presented to [facility] ED on 02/12/2022 with worsening SOB. Diagnosed with Afib and was started on Eliquis and BB. Also received 80 mg IV Lasix, and was discharged home.      02/22/2022: Patient presents for hospital follow-up. Has been feeling \"good\" until yesterday. Developed TELLES and noted orthopnea overnight. Also with recently worsening of cough resulting in increased use of PRN inhalers/nebulizers. Denies recent dietary indiscretion. Drinking no more than 2000 mL daily. Is completing daily weights; reports down-trending weights over past few days. Has noted slight decrease in response to PO torsemide.      Admitted to [facility] from 03/01 to 03/10/2022 after presenting with worsening SOB and LE swelling. Started on IV Lasix and IV steroids. Later was switched to IV Bumex on 03/04. Transitioned to PO Bumex on 03/07. CardioMEMS implanted on 03/09. Lost 7 lbs and net negative 8.1 L this admission.      03/14/2022: Patient presents for hospital follow-up. No current complaints. Weights stable at " home in low 310s lbs range. No issues with CardioMEMS system at home. Still waiting for new CPAP machine.      Presented to [facility] ED on 04/08 at the direction of his pulmonologist due to worsening SOB and TELLES, productive cough, and wheezing. In ED received albuterol nebulizer treatment. CXR without acute cardiopulmonary disease. Heart failure team recommended increasing Bumex to 2 mg qAM and 1 mg qPM with close outpatient follow-up.      Contacted by HF RN on 04/08 to review diuretic dosing changes. Patient reported no improvement since ED visit and presented to [facility] ED for second opinion/further evaluation. Admitted to [facility] from 04/08 to 04/11/2022. Started on IV steroids and IV Lasix. Diagnosed with acute asthma and HF exacerbations. Lost 2 lbs this admission. Discharged on PO steroid taper.     04/15/2022: Patient presents for hospital follow-up appointment. Reviewed recent hospital course(s). No current complaints. Has continued taking Bumex 2 mg qAm and 1 mg qPM. Is completing daily weights and denies any significant weight gain since returning home. Still waiting on having new CPAP machine delivered. Planning to return to work next week    6/24/22: Here for follow up. Overall doing well. Recent treatments for asthma exacerbation. Last steroid course 3 weeks ago. He is overall doing well. No shortness of breath, PND or orthopnea. Mild lower extremity edema. Home scale not working. Recent CardioMems PADs trending up.    7/28/22: Here for urgent visit due to worsening shortness of breath, weight gain and lower extremity edema. Noted symptoms since 7/25. Was getting extra doses of bumex with up to 2mg TID yesterday with no significant response. Correlates with PADs above goal. Reports adherence to diet and meds.    9/21/22: Here for follow up. Admitted for chest pain and shortness of breath 9/1/22. Euvolemic on exam. Troponins negative.  Pharmacological nuclear stress test showed  "inferior inferolateral infarct without significant ischemia.  Cardiac catheterization done showed no obstructive CAD. Also treated for asthma exacerbation and given course of steroids with improvement in symptoms. He is overall feeling well today, breathing better and doing rehab.    12/28/22: Here for follow up. Overall doing well. Last dose of additional bumex last month. Recent uptrend in weight related to diet. No shortness of breath, orthopnea or chest pain. No recent asthma exacerbations. Stable lower extremity edema.  PAD today 16, goal 15.    3/15/23: Here for follow up. Overall doing well until this week when he was noting more shortness of breath, wheezing worse with laying down, and leg swelling. Increased dose of bumex to 4mg BID with no significant response. Reports home scale unreliable.    Per MJ:  PAd elevated on 07/03; advised to take metolazone 2.5 x1 and increase Bumex to 4 mg BID. Advised again to take metolazone 2.5 mg x1 on 07/14/2023 (PAd of 20).     07/18/2023: Patient presents for an acute visit. Up 22 lbs weight gain since being discharged in 04/2023 with reported 6 lbs gain overnight on home scale. Reports worsening TELLES, bendopnea, LE swelling and fatigue. Feeling winded when showering. Continues with chronic orthopnea; denies PND. Reports losing only 1-2 lbs after taking 2.5 mg metolazone. Denies recent illness or recent dietary indiscretions.      Admitted to [facility] from 07/23 to 07/28/2023 after presenting with worsening SOB/TELLES, weight gain, and decreased urinary response.  (no priors). CXR with \"slight increased pulmonary vascular congestion.\" Started on IV Lasix, and cardiology consulted. Switched to Bumex drip on 07/24. Transitioned to PO Bumex on 07/26. MEMS pillow brought in from home. PAd reading of 13 on 07/27; PO Bumex dose decreased to 5 mg BID. PAd of 16 on 07/28. Lost 6 lbs this admission.      08/04/2023: Patient presents for hospital follow-up. Feeling well. " "Weights stable on home scale at 342 lbs. Has not required/taken metolazone since being home. Denies TELLES, LE swelling, PND, and orthopnea.       08/23/2023: Patient presents for follow-up. No current complaints. Weights stable on home scale. Has not required PRN metolazone since last visit. Denies SOB, TELLES, abdominal distention, LE swelling, PND, and orthopnea.      10/5/23: Here for follow up. Reports increasing shortness of breath, leg swelling and weight up 6 lbs over the past 3 days. Took metolazone 2.5mg daily with no good response. No dizziness or lightheadedness    Per MJ: \"Admitted to [facility] from 12/23 to 12/29/2023 after presenting with worsening SOB with 10 lbs weight gain. . CXR with \"No acute cardiopulmonary disease.\" Advanced HF team consulted. Started on Bumex drip. Entresto added to regimen on 12/26. Transitioned to PO Bumex on 12/27. Lost 12 lbs this admission.      01/11/2024: Patient presents for hospital follow-up. No current complaints. Feeling well since returning home. Only complaint today of nasal congestion (reports he has this every morning for years). Denies fevers and chills. Reports good response to Bumex and denies rapid weight gains on home scale since discharge. Has not required PRN metolazone. Is in the process of getting new CPAP equipment.\"      1/30/24: Here for follow-up.  Reports overall doing well.  Took metolazone this morning 5mg weight up 3 lbs from yesterday.  PAD this morning when CardioMEMS 12 mmHg.  Recently between 11-13, goal 15.  No worsening leg swelling or abdominal distention.  No worsening shortness of breath on exertion.  Adherent to medications.  Still working on getting CPAP.    Admitted 7/2024 for decompensated heart failure. Patient was aggressively diuresed with a Bumex gtt and transitioned to an oral regimen of 6 mg TID with PRN Metolazone 5 mg. Creatinine peaked around 2.     TH 8/7/24. Presents for hospital follow up.  Tells me he has been " feeling well since discharge home.  His weight has been stable on his home scale.  He is very upset that our scale is weighing him 10 pounds higher than his discharge weight.  Feels that this is incorrect.  We discussed dietary habits in detail today.  He assures me that he is not exceeding recommended sodium intake.  PA diastolic reading per cardio mems transmission on 8/7 was 17 mmHg with a goal of 15 mmHg     8/19/24:  Here for follow up. He reports overall doing well. No leg swelling or shortness of breath. PADs improved after dose of metolazone. PAD 14 today. Reports adherence to medications and diet      Review of Systems   Constitutional:  Negative for chills and fever.   HENT:  Negative for ear pain and sore throat.    Eyes:  Negative for pain and visual disturbance.   Respiratory:  Negative for cough, shortness of breath and wheezing.    Cardiovascular:  Negative for chest pain, palpitations and leg swelling.   Gastrointestinal:  Negative for abdominal distention, abdominal pain and vomiting.   Genitourinary:  Negative for dysuria and hematuria.   Musculoskeletal:  Negative for arthralgias and back pain.   Skin:  Negative for color change and rash.   Neurological:  Negative for dizziness, seizures, syncope and light-headedness.   All other systems reviewed and are negative.      Past Medical History:   Diagnosis Date    Acute gastritis without hemorrhage     last assessed 3/24/17    Asthma     Atrial fibrillation (HCC)     Bilateral leg edema 02/19/2020    CHF (congestive heart failure) (HCC)     Coronary artery disease     Daytime sleepiness 07/17/2019    Diabetes mellitus (HCC)     Dyspnea on exertion 10/11/2021    Edema of both feet 01/23/2020    Gastric bypass status for obesity     Hyperlipidemia     Hypertension     Hypokalemia 11/14/2021    Impaired fasting glucose     last assessed 5/10/17    Knee sprain, bilateral 06/14/2018    Leg cramps 06/16/2022    Obesity     Viral gastroenteritis     last  assessed 11/4/16         Allergies   Allergen Reactions    Azithromycin Other (See Comments)     Shaky, uneasy feeling     Bactrim [Sulfamethoxazole-Trimethoprim] Other (See Comments)     shakiness     .    Current Outpatient Medications:     Accu-Chek FastClix Lancets MISC, Test blood sugar twice daily (Patient taking differently: Takes blood sugar three times a week provider aware), Disp: 200 each, Rfl: 3    acetaminophen (TYLENOL) 325 mg tablet, Take 2 tablets (650 mg total) by mouth every 6 (six) hours as needed for mild pain, headaches or fever, Disp: , Rfl:     albuterol (PROVENTIL HFA,VENTOLIN HFA) 90 mcg/act inhaler, Inhale 2 puffs every 4 (four) hours as needed for wheezing or shortness of breath, Disp: 18 g, Rfl: 5    aluminum-magnesium hydroxide 200-200 MG/5ML suspension, Take 5 mL by mouth every 6 (six) hours as needed for heartburn, Disp: 355 mL, Rfl: 0    atorvastatin (LIPITOR) 10 mg tablet, TAKE 1 TABLET BY MOUTH EVERY DAY, Disp: 90 tablet, Rfl: 3    Blood Glucose Monitoring Suppl (Accu-Chek Guide) w/Device KIT, Use 2 (two) times a day Test blood sugar twice daily, Disp: 1 kit, Rfl: 0    budesonide-formoterol (Symbicort) 160-4.5 mcg/act inhaler, Inhale 2 puffs 2 (two) times a day Rinse mouth after use., Disp: 30.6 g, Rfl: 2    bumetanide (BUMEX) 2 mg tablet, Take 3 tablets (6 mg total) by mouth 3 (three) times a day, Disp: 270 tablet, Rfl: 0    Eliquis 5 MG, TAKE 1 TABLET BY MOUTH TWICE A DAY, Disp: 60 tablet, Rfl: 5    Empagliflozin (JARDIANCE) 10 MG TABS tablet, Take 1 tablet (10 mg total) by mouth daily, Disp: 30 tablet, Rfl: 11    famotidine (PEPCID) 20 mg tablet, Take 1 tablet (20 mg total) by mouth 2 (two) times a day (Patient taking differently: Take 20 mg by mouth if needed for heartburn As needed twice a day), Disp: 30 tablet, Rfl: 0    fluticasone (FLONASE) 50 mcg/act nasal spray, 1 spray into each nostril 2 (two) times a day, Disp: , Rfl: 0    Klor-Con M20 20 MEQ tablet, TAKE 2 TABLETS BY  MOUTH 2 TIMES A DAY., Disp: 360 tablet, Rfl: 2    loratadine (CLARITIN) 10 mg tablet, Take 1 tablet (10 mg total) by mouth daily Do not start before October 13, 2023. (Patient taking differently: Take 10 mg by mouth if needed for allergies As needed daily), Disp: , Rfl: 0    metolazone (ZAROXOLYN) 2.5 mg tablet, TAKE 2 TABLETS (5 MG TOTAL) BY MOUTH IF NEEDED (WEIGHT GAIN OF 3+ LBS IN 24 HOURS, 5+ LBS IN ONE WEEK OR SIGNS OF WORSENING FLUID RETENTION) TAKE 20-30 MINUTES BEFORE BUMEX DOSE AND WITH ADDITIONAL POTASSIUM, Disp: 180 tablet, Rfl: 1    metoprolol succinate (TOPROL-XL) 50 mg 24 hr tablet, Take 1 tablet (50 mg total) by mouth daily, Disp: 30 tablet, Rfl: 5    montelukast (SINGULAIR) 10 mg tablet, Take 1 tablet (10 mg total) by mouth daily at bedtime, Disp: 90 tablet, Rfl: 1    pregabalin (LYRICA) 50 mg capsule, Take 1 caps. at bedtime, Disp: 30 capsule, Rfl: 5    sitaGLIPtin (Januvia) 100 mg tablet, TAKE 1/2 TABLET BY MOUTH EVERY DAY, Disp: 15 tablet, Rfl: 5    spironolactone (ALDACTONE) 25 mg tablet, TAKE 1 TABLET (25 MG TOTAL) BY MOUTH DAILY., Disp: 90 tablet, Rfl: 1    EPINEPHrine (EPIPEN) 0.3 mg/0.3 mL SOAJ, Inject 0.3 mL (0.3 mg total) into a muscle once for 1 dose, Disp: 0.6 mL, Rfl: 0    nitroglycerin (NITROSTAT) 0.4 mg SL tablet, Place 1 tablet (0.4 mg total) under the tongue every 5 (five) minutes as needed for chest pain for up to 50 doses, Disp: 50 tablet, Rfl: 0    omeprazole (PriLOSEC) 20 mg delayed release capsule, Take 1 capsule (20 mg total) by mouth daily for 14 days (Patient not taking: Reported on 8/8/2024), Disp: 14 capsule, Rfl: 0    tiotropium (Spiriva Respimat) 1.25 MCG/ACT AERS inhaler, Inhale 2 puffs daily, Disp: 4 g, Rfl: 0    Social History     Socioeconomic History    Marital status: /Civil Union     Spouse name: Not on file    Number of children: Not on file    Years of education: Not on file    Highest education level: Not on file   Occupational History    Not on file    Tobacco Use    Smoking status: Former     Current packs/day: 1.00     Average packs/day: 1 pack/day for 5.0 years (5.0 ttl pk-yrs)     Types: Pipe, Cigars, Cigarettes     Start date: 2016     Quit date: 1/1/2021     Passive exposure: Never    Smokeless tobacco: Former    Tobacco comments:     cigar once a day   Vaping Use    Vaping status: Never Used   Substance and Sexual Activity    Alcohol use: Yes     Alcohol/week: 2.0 standard drinks of alcohol     Types: 2 Shots of liquor per week     Comment: social    Drug use: No    Sexual activity: Yes     Partners: Female     Birth control/protection: None   Other Topics Concern    Not on file   Social History Narrative    Not on file     Social Determinants of Health     Financial Resource Strain: Not on file   Food Insecurity: Food Insecurity Present (8/1/2024)    Hunger Vital Sign     Worried About Running Out of Food in the Last Year: Sometimes true     Ran Out of Food in the Last Year: Never true   Transportation Needs: No Transportation Needs (8/1/2024)    PRAPARE - Transportation     Lack of Transportation (Medical): No     Lack of Transportation (Non-Medical): No   Physical Activity: Not on file   Stress: Not on file   Social Connections: Not on file   Intimate Partner Violence: Not on file   Housing Stability: Low Risk  (8/1/2024)    Housing Stability Vital Sign     Unable to Pay for Housing in the Last Year: No     Number of Times Moved in the Last Year: 0     Homeless in the Last Year: No       Family History   Problem Relation Age of Onset    Stroke Mother     Hypertension Father     Cancer Father     COPD Father        Physical Exam:    Vitals:   Vitals:    08/20/24 1131   BP: 118/70   Pulse: 87   SpO2: 96%         Physical Exam  Constitutional:       General: He is not in acute distress.     Appearance: Normal appearance.   HENT:      Head: Normocephalic and atraumatic.      Mouth/Throat:      Mouth: Mucous membranes are moist.   Eyes:      General: No  scleral icterus.     Extraocular Movements: Extraocular movements intact.   Cardiovascular:      Rate and Rhythm: Normal rate and regular rhythm.      Pulses: Normal pulses.      Heart sounds: S1 normal and S2 normal. No murmur heard.     No friction rub. No gallop.      Comments: Nonelevated JVP. Trace edema bilaterally  Pulmonary:      Effort: Pulmonary effort is normal.      Breath sounds: No wheezing, rhonchi or rales.   Abdominal:      General: There is no distension.      Palpations: Abdomen is soft.      Tenderness: There is no abdominal tenderness. There is no guarding or rebound.   Musculoskeletal:         General: Normal range of motion.      Cervical back: Neck supple.   Skin:     General: Skin is warm and dry.      Capillary Refill: Capillary refill takes less than 2 seconds.   Neurological:      General: No focal deficit present.      Mental Status: He is alert and oriented to person, place, and time.   Psychiatric:         Mood and Affect: Mood normal.         Labs & Results:    Lab Results   Component Value Date    SODIUM 137 08/07/2024    K 3.8 08/07/2024     08/07/2024    CO2 27 08/07/2024    BUN 33 (H) 08/07/2024    CREATININE 1.56 (H) 08/07/2024    GLUC 161 (H) 08/04/2024    CALCIUM 8.0 (L) 08/07/2024     Lab Results   Component Value Date    WBC 7.85 08/04/2024    HGB 10.7 (L) 08/04/2024    HCT 35.1 (L) 08/04/2024    MCV 83 08/04/2024     08/04/2024     Lab Results   Component Value Date    NTBNP 697 (H) 04/10/2023      Lab Results   Component Value Date    CHOLESTEROL 170 10/06/2023    CHOLESTEROL 165 11/22/2022    CHOLESTEROL 235 (H) 09/19/2022     Lab Results   Component Value Date    HDL 55 10/06/2023    HDL 55 11/22/2022    HDL 64 09/19/2022     Lab Results   Component Value Date    TRIG 146 10/06/2023    TRIG 127 11/22/2022    TRIG 198 (H) 09/19/2022     Lab Results   Component Value Date    NONHDLC 110 11/22/2022    NONHDLC 171 09/19/2022    NONHDLC 118 09/05/2022       EKG  personally reviewed by Monica Wilcox.     I have spent a total time of 30 minutes in caring for this patient on the day of the visit/encounter including Instructions for management, Importance of tx compliance, Impressions, Counseling / Coordination of care, Documenting in the medical record, Reviewing / ordering tests, medicine, procedures  , and Obtaining or reviewing history  .    Thank you for the opportunity to participate in the care of this patient.    MONICA WILCOX MD  ADVANCED HEART FAILURE AND MECHANICAL CIRCULATORY SUPPORT  [facility]

## 2024-08-20 ENCOUNTER — TELEPHONE (OUTPATIENT)
Dept: CARDIOLOGY CLINIC | Facility: CLINIC | Age: 59
End: 2024-08-20

## 2024-08-20 ENCOUNTER — OFFICE VISIT (OUTPATIENT)
Dept: CARDIOLOGY CLINIC | Facility: CLINIC | Age: 59
End: 2024-08-20
Payer: COMMERCIAL

## 2024-08-20 VITALS
SYSTOLIC BLOOD PRESSURE: 118 MMHG | OXYGEN SATURATION: 96 % | DIASTOLIC BLOOD PRESSURE: 70 MMHG | HEIGHT: 73 IN | HEART RATE: 87 BPM | BODY MASS INDEX: 41.75 KG/M2 | WEIGHT: 315 LBS

## 2024-08-20 DIAGNOSIS — I50.32 CHRONIC HEART FAILURE WITH PRESERVED EJECTION FRACTION (HCC): Primary | ICD-10-CM

## 2024-08-20 DIAGNOSIS — G47.33 OBSTRUCTIVE SLEEP APNEA: ICD-10-CM

## 2024-08-20 DIAGNOSIS — N18.30 STAGE 3 CHRONIC KIDNEY DISEASE, UNSPECIFIED WHETHER STAGE 3A OR 3B CKD (HCC): ICD-10-CM

## 2024-08-20 DIAGNOSIS — I48.0 PAROXYSMAL ATRIAL FIBRILLATION (HCC): ICD-10-CM

## 2024-08-20 PROCEDURE — 99214 OFFICE O/P EST MOD 30 MIN: CPT | Performed by: INTERNAL MEDICINE

## 2024-08-20 NOTE — PATIENT INSTRUCTIONS
No medication changes today  2g sodium diet  2L fluid restriction  Daily weights, call office for weight gain of 3 lbs in a day or 5 lbs in 5-7 days.  Sleep study per PCP

## 2024-08-20 NOTE — TELEPHONE ENCOUNTER
Dr Wilcox communicated she wanted patient's CardioMems reading goal to be set @ 14. She stated she &/or CHF team need to be notified when patient's readings are 15 or >.    Changed patient's goal to 14 on CardioMems, [facility] website

## 2024-08-30 ENCOUNTER — CLINICAL SUPPORT (OUTPATIENT)
Dept: CARDIOLOGY CLINIC | Facility: CLINIC | Age: 59
End: 2024-08-30
Payer: COMMERCIAL

## 2024-08-30 ENCOUNTER — TELEPHONE (OUTPATIENT)
Dept: CARDIOLOGY CLINIC | Facility: CLINIC | Age: 59
End: 2024-08-30

## 2024-08-30 DIAGNOSIS — R07.9 CHEST PAIN, UNSPECIFIED TYPE: ICD-10-CM

## 2024-08-30 DIAGNOSIS — R55 SYNCOPE, UNSPECIFIED SYNCOPE TYPE: ICD-10-CM

## 2024-08-30 DIAGNOSIS — I50.30 DIASTOLIC CONGESTIVE HEART FAILURE, UNSPECIFIED HF CHRONICITY (HCC): ICD-10-CM

## 2024-08-30 DIAGNOSIS — I50.30 DIASTOLIC CONGESTIVE HEART FAILURE, UNSPECIFIED HF CHRONICITY (HCC): Primary | ICD-10-CM

## 2024-08-30 PROCEDURE — 93244 EXT ECG>48HR<7D REV&INTERPJ: CPT | Performed by: INTERNAL MEDICINE

## 2024-08-30 PROCEDURE — 93264 REM MNTR WRLS P-ART PRS SNR: CPT | Performed by: INTERNAL MEDICINE

## 2024-08-30 NOTE — TELEPHONE ENCOUNTER
Left message for patient to return call to office.  CardioMems  8/30 - 13 8/29 ---  8/28 - 15  8/27 - 14 8/26 ---  8/25 - 15  8/24 ---  8/23 - 10  8/22 - 13 8/21 - 12

## 2024-09-06 ENCOUNTER — TELEPHONE (OUTPATIENT)
Dept: CARDIOLOGY CLINIC | Facility: CLINIC | Age: 59
End: 2024-09-06

## 2024-09-06 NOTE — TELEPHONE ENCOUNTER
Called patient and left message       ----- Message from Shannon PRADO sent at 9/3/2024  1:55 PM EDT -----    ----- Message -----  From: RODRIGUE Rodriges  Sent: 9/3/2024   1:40 PM EDT  To: Shannon Irwin MA    Please call patient and let him know there were no concerning findings on his monitor. A few episodes of SVT, (fast arrhythmia originating in the upper chambers of the heart) as well as some PVCs, (arrhythmia originating in the lower chambers), and s  ome slower heart rate during sleep hours which may be associated with his sleep apnea. Can discuss further at next OV. Thanks.

## 2024-09-09 DIAGNOSIS — I50.32 CHRONIC HEART FAILURE WITH PRESERVED EJECTION FRACTION (HCC): ICD-10-CM

## 2024-09-10 RX ORDER — METOLAZONE 2.5 MG/1
5 TABLET ORAL AS NEEDED
Qty: 180 TABLET | Refills: 1 | Status: SHIPPED | OUTPATIENT
Start: 2024-09-10

## 2024-09-11 ENCOUNTER — TELEPHONE (OUTPATIENT)
Dept: LAB | Facility: HOSPITAL | Age: 59
End: 2024-09-11

## 2024-09-11 DIAGNOSIS — J45.20 MILD INTERMITTENT ASTHMA WITHOUT COMPLICATION: ICD-10-CM

## 2024-09-11 RX ORDER — ALBUTEROL SULFATE 90 UG/1
AEROSOL, METERED RESPIRATORY (INHALATION)
Qty: 18 G | Refills: 5 | Status: SHIPPED | OUTPATIENT
Start: 2024-09-11

## 2024-09-12 ENCOUNTER — APPOINTMENT (OUTPATIENT)
Dept: LAB | Facility: HOSPITAL | Age: 59
End: 2024-09-12
Attending: INTERNAL MEDICINE
Payer: COMMERCIAL

## 2024-09-12 DIAGNOSIS — I50.32 CHRONIC HEART FAILURE WITH PRESERVED EJECTION FRACTION (HCC): ICD-10-CM

## 2024-09-12 LAB
ANION GAP SERPL CALCULATED.3IONS-SCNC: 12 MMOL/L (ref 4–13)
BUN SERPL-MCNC: 24 MG/DL (ref 5–25)
CALCIUM SERPL-MCNC: 8.4 MG/DL (ref 8.4–10.2)
CHLORIDE SERPL-SCNC: 99 MMOL/L (ref 96–108)
CO2 SERPL-SCNC: 25 MMOL/L (ref 21–32)
CREAT SERPL-MCNC: 1.43 MG/DL (ref 0.6–1.3)
GFR SERPL CREATININE-BSD FRML MDRD: 53 ML/MIN/1.73SQ M
GLUCOSE SERPL-MCNC: 189 MG/DL (ref 65–140)
POTASSIUM SERPL-SCNC: 4.3 MMOL/L (ref 3.5–5.3)
SODIUM SERPL-SCNC: 136 MMOL/L (ref 135–147)

## 2024-09-12 PROCEDURE — 36415 COLL VENOUS BLD VENIPUNCTURE: CPT

## 2024-09-12 PROCEDURE — 80048 BASIC METABOLIC PNL TOTAL CA: CPT

## 2024-09-23 ENCOUNTER — TELEPHONE (OUTPATIENT)
Age: 59
End: 2024-09-23

## 2024-09-23 NOTE — TELEPHONE ENCOUNTER
Left message for patient to return call to the office regarding rescheduling appointment 9/26/24 with Dr Cosby. Dr Cosby had a medical emergency and will be out of the office indefinitely. Dr Haro is the provider filling in. There are openings at our [place] office October 7 and 8. [facility] appointments are scheduling in November. Provided call back number [phone].

## 2024-09-25 ENCOUNTER — TELEPHONE (OUTPATIENT)
Dept: HEMATOLOGY ONCOLOGY | Facility: CLINIC | Age: 59
End: 2024-09-25

## 2024-09-30 ENCOUNTER — TELEPHONE (OUTPATIENT)
Dept: CARDIOLOGY CLINIC | Facility: CLINIC | Age: 59
End: 2024-09-30

## 2024-09-30 ENCOUNTER — CLINICAL SUPPORT (OUTPATIENT)
Dept: CARDIOLOGY CLINIC | Facility: CLINIC | Age: 59
End: 2024-09-30
Payer: COMMERCIAL

## 2024-09-30 DIAGNOSIS — I50.32 CHRONIC HEART FAILURE WITH PRESERVED EJECTION FRACTION (HCC): Primary | ICD-10-CM

## 2024-09-30 PROCEDURE — 93264 REM MNTR WRLS P-ART PRS SNR: CPT | Performed by: INTERNAL MEDICINE

## 2024-09-30 NOTE — PROGRESS NOTES
Advanced Heart Failure Outpatient Progress Note - Mesfin Cano 59 y.o. male MRN: [id number]    Encounter: [id number]      Assessment/Plan:    Patient Active Problem List    Diagnosis Date Noted    Anemia 07/31/2024    Bilateral leg edema 07/02/2024    Abnormal stress test 06/26/2024    Uncontrolled atrial flutter (HCC) 06/18/2024    Moderate persistent asthma without complication 06/18/2024    Status post cholecystectomy 02/17/2024    Diabetic neuropathy (HCC) 12/23/2023    Eosinophilia 10/18/2023    Anemia due to chronic kidney disease 10/16/2023    Nasal congestion 10/06/2023    Loose stools 04/17/2023    Acute on chronic diastolic congestive heart failure (HCC) 04/10/2023    Diabetic polyneuropathy associated with type 2 diabetes mellitus (HCC) 12/20/2022    Stage 3a chronic kidney disease (HCC) 09/16/2022    Presence of CardioMEMS HF system 03/09/2022    Paroxysmal atrial fibrillation (HCC) 03/01/2022    Hypokalemia 11/14/2021    HNP (herniated nucleus pulposus), lumbar 02/23/2021    Foraminal stenosis of lumbar region 02/23/2021    VICKY (obstructive sleep apnea)     Hyperlipidemia 04/18/2019    Primary osteoarthritis of both knees 06/14/2018    Irritable bowel syndrome with diarrhea 01/30/2018    Primary hypertension 01/30/2018    Vitamin B12 deficiency 03/08/2016    Vitamin D deficiency 03/08/2016    Morbid obesity (HCC) 02/23/2016    Primary osteoarthritis of right knee 10/15/2013     # Chronic HFpEF, Stage C, NYHA III   History of cardiomyopathy with mid range EF in 2020.  Volume up on exam  PAD trending up 15 >> 18 9/30, no transmission yet today  Weight up since last visit     Weight:  334 lbs 1/30/24; 325 lbs 7/31/24; 318 lbs on discharge 8/4/24; 319 lbs 8/8/24; 317 lbs 8/20/24; 324 lbs 10/1/24  BNP: 165 2/4/24; 267 7/30/24; 263 8/7/24        Studies- personally reviewed by me       Pharm Nuc Stress 6/25/24: Medium to large size partially reversible inferior/inferolateral perfusion defect concerning for  inferior ischemia with inferolateral infarct v diaphragmatic attenuation artifact. Prone imaging could not be performed to further clarify this defect.   -similar to prior     RHC 10/11/23: CardioMEMS data correlates to RHC.   -140 during case  HR 74  O2 sat 97%  Hgb 10.5     RA 8  RV 30/8  PA 28/12, 17  PCWP 10-12 (multiple checks)  TPG 5  PA sat 59% (multiple checks)  Travis CO/CI: 6.6/2.3  TD CO/CI: 7.43/2.78     Simultaneous CardioMEMS data:  Mean: 26/10/15  End expiration on MEMS waveform: 28/10     LHC 9/6/22: No obstructive CAD     Pharm Nuc Stress 9/2/22: Abnormal study due to the presence of an inferior and inferolateral infarct without significant ischemia.     RHC 3/9/22:   RA 4   RV 35/4   PA 35/12/21   PCWP 10   PA sat 64%   Travis CO 5.56 L/min   Travis CI 2.2L/min/m2   PVR 1.97 SAGASTUME      TTE 03/25/2020: LVEF 40-45%. LVIDd 6.12 cm. Normal RV.    TTE 07/19/2021: LVEF 50%. LVIDd 6.13 cm. Grade 1 DD. Normal RV. Trace MR and TR. Mildly dilated IVC.    TTE 11/12/2021: LVEF 55%. LVIDd 6.0 cm. Grade 1 DD. Normal RV. Trace TR.       Neurohormonal Blockade:   --Beta Blocker: metoprolol succinate 50 mg daily.    --ARNi / ACEi / ARB:   --Aldosterone Antagonist: spironolactone 25mg daily  --SGLT2 Inhibitor: Jardiance 10mg daily  --Diuretic: bumex 6mg TID with potassium 40meq BID; prn metolazone 5mg with additional potassium 40 meq    Sudden Cardiac Death Risk Reduction:   --LVEF >35%.       Electrical Resynchronization:   --Candidacy for BiV device: narrow QRS.        Advanced Therapies:   Will continue to monitor. LVEF recovered      # Atrial fibrillation/flutter  [id number] = 3 (HF, HTN, DM).   Diagnosed 02/2022.   Anticoagulation with Eliquis.    Rate control: BB as above.   Rhythm control: s/p DCCV     # Hypertension, controlled  Continue GDMT as above      # Hyperlipidemia   10/6/23:  HDL 55 LDL 86  Continues on atorvastatin 10 mg daily.        # Diabetes mellitus, type II     Non-insulin dependent.  "  HbA1c 6/19/24: 8.3, per primary      # Obstructive sleep apnea, Not currently using CPAP given machine recall. Needs repeat sleep study   # Chronic respiratory failure   # Asthma, severe persistent ,  follows with Dr. Noonan as outpatient.   # H/o gastric bypass (Samuel-en-Y)surgery    # History of tobacco abuse   # CKD 3b, recent baseline 1.5-2, 1.43 9/12/24  # s/p CardioMems implant 3/9/22   PAD 18 9/30/24    TODAY'S PLAN:  Take metolazone dose today with additional potassium 40meq today  Continue rest of cardiac medications  Daily weights and CardioMems transmissions  2g sodium diet  2L fluid restriction  Daily weights, call office for weight gain of 3 lbs in a day or 5 lbs in 5-7 days.  Sleep study per PCP         HPI:   Mesfin Cano is a 59 y.o. man with a PMH as above who presents to the office for follow-up.     Admitted to [facility] from 12/23 to 01/01/2022 after presenting with worsening SOB. Started on IV Lasix (later switched to IV Bumex) and IV steroids. Did receive 2 doses of metolazone while inpatient. Transitioned to PO torsemide on day of discharge. Lost 12 lbs and net negative 15 L this admission.      Presented to [facility] ED on 02/12/2022 with worsening SOB. Diagnosed with Afib and was started on Eliquis and BB. Also received 80 mg IV Lasix, and was discharged home.      02/22/2022: Patient presents for hospital follow-up. Has been feeling \"good\" until yesterday. Developed TELLES and noted orthopnea overnight. Also with recently worsening of cough resulting in increased use of PRN inhalers/nebulizers. Denies recent dietary indiscretion. Drinking no more than 2000 mL daily. Is completing daily weights; reports down-trending weights over past few days. Has noted slight decrease in response to PO torsemide.      Admitted to [facility] from 03/01 to 03/10/2022 after presenting with worsening SOB and LE swelling. Started on IV Lasix and IV steroids. Later was switched to IV Bumex on 03/04. " Transitioned to PO Bumex on 03/07. CardioMEMS implanted on 03/09. Lost 7 lbs and net negative 8.1 L this admission.      03/14/2022: Patient presents for hospital follow-up. No current complaints. Weights stable at home in low 310s lbs range. No issues with CardioMEMS system at home. Still waiting for new CPAP machine.      Presented to [facility] ED on 04/08 at the direction of his pulmonologist due to worsening SOB and TELLES, productive cough, and wheezing. In ED received albuterol nebulizer treatment. CXR without acute cardiopulmonary disease. Heart failure team recommended increasing Bumex to 2 mg qAM and 1 mg qPM with close outpatient follow-up.      Contacted by HF RN on 04/08 to review diuretic dosing changes. Patient reported no improvement since ED visit and presented to [facility] ED for second opinion/further evaluation. Admitted to [facility] from 04/08 to 04/11/2022. Started on IV steroids and IV Lasix. Diagnosed with acute asthma and HF exacerbations. Lost 2 lbs this admission. Discharged on PO steroid taper.     04/15/2022: Patient presents for hospital follow-up appointment. Reviewed recent hospital course(s). No current complaints. Has continued taking Bumex 2 mg qAm and 1 mg qPM. Is completing daily weights and denies any significant weight gain since returning home. Still waiting on having new CPAP machine delivered. Planning to return to work next week    6/24/22: Here for follow up. Overall doing well. Recent treatments for asthma exacerbation. Last steroid course 3 weeks ago. He is overall doing well. No shortness of breath, PND or orthopnea. Mild lower extremity edema. Home scale not working. Recent CardioMems PADs trending up.    7/28/22: Here for urgent visit due to worsening shortness of breath, weight gain and lower extremity edema. Noted symptoms since 7/25. Was getting extra doses of bumex with up to 2mg TID yesterday with no significant response. Correlates with PADs above  "goal. Reports adherence to diet and meds.    9/21/22: Here for follow up. Admitted for chest pain and shortness of breath 9/1/22. Euvolemic on exam. Troponins negative.  Pharmacological nuclear stress test showed inferior inferolateral infarct without significant ischemia.  Cardiac catheterization done showed no obstructive CAD. Also treated for asthma exacerbation and given course of steroids with improvement in symptoms. He is overall feeling well today, breathing better and doing rehab.    12/28/22: Here for follow up. Overall doing well. Last dose of additional bumex last month. Recent uptrend in weight related to diet. No shortness of breath, orthopnea or chest pain. No recent asthma exacerbations. Stable lower extremity edema.  PAD today 16, goal 15.    3/15/23: Here for follow up. Overall doing well until this week when he was noting more shortness of breath, wheezing worse with laying down, and leg swelling. Increased dose of bumex to 4mg BID with no significant response. Reports home scale unreliable.    Per MJ:  PAd elevated on 07/03; advised to take metolazone 2.5 x1 and increase Bumex to 4 mg BID. Advised again to take metolazone 2.5 mg x1 on 07/14/2023 (PAd of 20).     07/18/2023: Patient presents for an acute visit. Up 22 lbs weight gain since being discharged in 04/2023 with reported 6 lbs gain overnight on home scale. Reports worsening TELLES, bendopnea, LE swelling and fatigue. Feeling winded when showering. Continues with chronic orthopnea; denies PND. Reports losing only 1-2 lbs after taking 2.5 mg metolazone. Denies recent illness or recent dietary indiscretions.      Admitted to [facility] from 07/23 to 07/28/2023 after presenting with worsening SOB/TELLES, weight gain, and decreased urinary response.  (no priors). CXR with \"slight increased pulmonary vascular congestion.\" Started on IV Lasix, and cardiology consulted. Switched to Bumex drip on 07/24. Transitioned to PO Bumex on 07/26. MEMS " "pillow brought in from home. PAd reading of 13 on 07/27; PO Bumex dose decreased to 5 mg BID. PAd of 16 on 07/28. Lost 6 lbs this admission.      08/04/2023: Patient presents for hospital follow-up. Feeling well. Weights stable on home scale at 342 lbs. Has not required/taken metolazone since being home. Denies TELLES, LE swelling, PND, and orthopnea.       08/23/2023: Patient presents for follow-up. No current complaints. Weights stable on home scale. Has not required PRN metolazone since last visit. Denies SOB, TELLES, abdominal distention, LE swelling, PND, and orthopnea.      10/5/23: Here for follow up. Reports increasing shortness of breath, leg swelling and weight up 6 lbs over the past 3 days. Took metolazone 2.5mg daily with no good response. No dizziness or lightheadedness    Per MJ: \"Admitted to [facility] from 12/23 to 12/29/2023 after presenting with worsening SOB with 10 lbs weight gain. . CXR with \"No acute cardiopulmonary disease.\" Advanced HF team consulted. Started on Bumex drip. Entresto added to regimen on 12/26. Transitioned to PO Bumex on 12/27. Lost 12 lbs this admission.      01/11/2024: Patient presents for hospital follow-up. No current complaints. Feeling well since returning home. Only complaint today of nasal congestion (reports he has this every morning for years). Denies fevers and chills. Reports good response to Bumex and denies rapid weight gains on home scale since discharge. Has not required PRN metolazone. Is in the process of getting new CPAP equipment.\"      1/30/24: Here for follow-up.  Reports overall doing well.  Took metolazone this morning 5mg weight up 3 lbs from yesterday.  PAD this morning when CardioMEMS 12 mmHg.  Recently between 11-13, goal 15.  No worsening leg swelling or abdominal distention.  No worsening shortness of breath on exertion.  Adherent to medications.  Still working on getting CPAP.    Admitted 7/2024 for decompensated heart failure. Patient was " aggressively diuresed with a Bumex gtt and transitioned to an oral regimen of 6 mg TID with PRN Metolazone 5 mg. Creatinine peaked around 2.     TH 8/7/24. Presents for hospital follow up.  Tells me he has been feeling well since discharge home.  His weight has been stable on his home scale.  He is very upset that our scale is weighing him 10 pounds higher than his discharge weight.  Feels that this is incorrect.  We discussed dietary habits in detail today.  He assures me that he is not exceeding recommended sodium intake.  PA diastolic reading per cardio mems transmission on 8/7 was 17 mmHg with a goal of 15 mmHg     8/19/24:  Here for follow up. He reports overall doing well. No leg swelling or shortness of breath. PADs improved after dose of metolazone. PAD 14 today. Reports adherence to medications and diet    10/1/24: here for follow up.  No medication changes on last office visit as above. 9/12/2024 sodium 136 potassium 4.3 creatinine stable at 1.43.  CardioMEMS trending up -15 the last 2 days and up to 18 yesterday.  No transmission yesterday.  Weight up on office visit today.  He has not taken home weight today.  Denies leg swelling, PND orthopnea.  No chest pain, palpitations, dizziness or lightheadedness.  He reports being able to lay flat in the couch where he sleeps.  Denies wheezing.  He has nasal congestion/allergy.  He is able to walk about 1 block twice a day with no shortness of breath.      Review of Systems   Constitutional:  Negative for chills and fever.   HENT:  Negative for ear pain and sore throat.    Eyes:  Negative for pain and visual disturbance.   Respiratory:  Negative for cough, shortness of breath and wheezing.    Cardiovascular:  Negative for chest pain, palpitations and leg swelling.   Gastrointestinal:  Negative for abdominal distention, abdominal pain and vomiting.   Genitourinary:  Negative for dysuria and hematuria.   Musculoskeletal:  Negative for arthralgias and back pain.    Skin:  Negative for color change and rash.   Neurological:  Negative for dizziness, seizures, syncope and light-headedness.   All other systems reviewed and are negative.      Past Medical History:   Diagnosis Date    Acute gastritis without hemorrhage     last assessed 3/24/17    Asthma     Atrial fibrillation (HCC)     Bilateral leg edema 02/19/2020    CHF (congestive heart failure) ([facility])     Coronary artery disease     Daytime sleepiness 07/17/2019    Diabetes mellitus (HCC)     Dyspnea on exertion 10/11/2021    Edema of both feet 01/23/2020    Gastric bypass status for obesity     Hyperlipidemia     Hypertension     Hypokalemia 11/14/2021    Impaired fasting glucose     last assessed 5/10/17    Knee sprain, bilateral 06/14/2018    Leg cramps 06/16/2022    Obesity     Viral gastroenteritis     last assessed 11/4/16         Allergies   Allergen Reactions    Azithromycin Other (See Comments)     Shaky, uneasy feeling     Bactrim [Sulfamethoxazole-Trimethoprim] Other (See Comments)     shakiness     .    Current Outpatient Medications:     Accu-Chek FastClix Lancets MISC, Test blood sugar twice daily (Patient taking differently: Takes blood sugar three times a week provider aware), Disp: 200 each, Rfl: 3    acetaminophen (TYLENOL) 325 mg tablet, Take 2 tablets (650 mg total) by mouth every 6 (six) hours as needed for mild pain, headaches or fever, Disp: , Rfl:     albuterol (PROVENTIL HFA,VENTOLIN HFA) 90 mcg/act inhaler, INHALE 2 PUFFS EVERY 4 HOURS AS NEEDED FOR WHEEZING OR SHORTNESS OF BREATH, Disp: 18 g, Rfl: 5    aluminum-magnesium hydroxide 200-200 MG/5ML suspension, Take 5 mL by mouth every 6 (six) hours as needed for heartburn, Disp: 355 mL, Rfl: 0    atorvastatin (LIPITOR) 10 mg tablet, TAKE 1 TABLET BY MOUTH EVERY DAY, Disp: 90 tablet, Rfl: 3    Blood Glucose Monitoring Suppl (Accu-Chek Guide) w/Device KIT, Use 2 (two) times a day Test blood sugar twice daily, Disp: 1 kit, Rfl: 0    budesonide-formoterol  (Symbicort) 160-4.5 mcg/act inhaler, Inhale 2 puffs 2 (two) times a day Rinse mouth after use., Disp: 30.6 g, Rfl: 2    bumetanide (BUMEX) 2 mg tablet, Take 3 tablets (6 mg total) by mouth 3 (three) times a day, Disp: 270 tablet, Rfl: 0    Eliquis 5 MG, TAKE 1 TABLET BY MOUTH TWICE A DAY, Disp: 60 tablet, Rfl: 5    Empagliflozin (JARDIANCE) 10 MG TABS tablet, Take 1 tablet (10 mg total) by mouth daily, Disp: 30 tablet, Rfl: 11    EPINEPHrine (EPIPEN) 0.3 mg/0.3 mL SOAJ, Inject 0.3 mL (0.3 mg total) into a muscle once for 1 dose, Disp: 0.6 mL, Rfl: 0    famotidine (PEPCID) 20 mg tablet, Take 1 tablet (20 mg total) by mouth 2 (two) times a day (Patient taking differently: Take 20 mg by mouth if needed for heartburn As needed twice a day), Disp: 30 tablet, Rfl: 0    fluticasone (FLONASE) 50 mcg/act nasal spray, 1 spray into each nostril 2 (two) times a day, Disp: , Rfl: 0    Klor-Con M20 20 MEQ tablet, TAKE 2 TABLETS BY MOUTH 2 TIMES A DAY., Disp: 360 tablet, Rfl: 2    loratadine (CLARITIN) 10 mg tablet, Take 1 tablet (10 mg total) by mouth daily Do not start before October 13, 2023. (Patient taking differently: Take 10 mg by mouth if needed for allergies As needed daily), Disp: , Rfl: 0    metolazone (ZAROXOLYN) 2.5 mg tablet, TAKE 2 TABLETS (5 MG TOTAL) BY MOUTH IF NEEDED (WEIGHT GAIN OF 3+ LBS IN 24 HOURS, 5+ LBS IN ONE WEEK OR SIGNS OF WORSENING FLUID RETENTION) TAKE 20-30 MINUTES BEFORE BUMEX DOSE AND WITH ADDITIONAL POTASSIUM, Disp: 180 tablet, Rfl: 1    metoprolol succinate (TOPROL-XL) 50 mg 24 hr tablet, Take 1 tablet (50 mg total) by mouth daily, Disp: 30 tablet, Rfl: 5    montelukast (SINGULAIR) 10 mg tablet, Take 1 tablet (10 mg total) by mouth daily at bedtime, Disp: 90 tablet, Rfl: 1    nitroglycerin (NITROSTAT) 0.4 mg SL tablet, Place 1 tablet (0.4 mg total) under the tongue every 5 (five) minutes as needed for chest pain for up to 50 doses, Disp: 50 tablet, Rfl: 0    omeprazole (PriLOSEC) 20 mg delayed  release capsule, Take 1 capsule (20 mg total) by mouth daily for 14 days (Patient not taking: Reported on 8/8/2024), Disp: 14 capsule, Rfl: 0    pregabalin (LYRICA) 50 mg capsule, Take 1 caps. at bedtime, Disp: 30 capsule, Rfl: 5    sitaGLIPtin (Januvia) 100 mg tablet, TAKE 1/2 TABLET BY MOUTH EVERY DAY, Disp: 15 tablet, Rfl: 5    spironolactone (ALDACTONE) 25 mg tablet, TAKE 1 TABLET (25 MG TOTAL) BY MOUTH DAILY., Disp: 90 tablet, Rfl: 1    tiotropium (Spiriva Respimat) 1.25 MCG/ACT AERS inhaler, Inhale 2 puffs daily, Disp: 4 g, Rfl: 0    Social History     Socioeconomic History    Marital status: /Civil Union     Spouse name: Not on file    Number of children: Not on file    Years of education: Not on file    Highest education level: Not on file   Occupational History    Not on file   Tobacco Use    Smoking status: Former     Current packs/day: 1.00     Average packs/day: 1 pack/day for 5.0 years (5.0 ttl pk-yrs)     Types: Pipe, Cigars, Cigarettes     Start date: 2016     Quit date: 1/1/2021     Passive exposure: Never    Smokeless tobacco: Former    Tobacco comments:     cigar once a day   Vaping Use    Vaping status: Never Used   Substance and Sexual Activity    Alcohol use: Yes     Alcohol/week: 2.0 standard drinks of alcohol     Types: 2 Shots of liquor per week     Comment: social    Drug use: No    Sexual activity: Yes     Partners: Female     Birth control/protection: None   Other Topics Concern    Not on file   Social History Narrative    Not on file     Social Determinants of Health     Financial Resource Strain: Not on file   Food Insecurity: Food Insecurity Present (8/1/2024)    Hunger Vital Sign     Worried About Running Out of Food in the Last Year: Sometimes true     Ran Out of Food in the Last Year: Never true   Transportation Needs: No Transportation Needs (8/1/2024)    PRAPARE - Transportation     Lack of Transportation (Medical): No     Lack of Transportation (Non-Medical): No   Physical  "Activity: Not on file   Stress: Not on file   Social Connections: Not on file   Intimate Partner Violence: Not on file   Housing Stability: Low Risk  (8/1/2024)    Housing Stability Vital Sign     Unable to Pay for Housing in the Last Year: No     Number of Times Moved in the Last Year: 0     Homeless in the Last Year: No       Family History   Problem Relation Age of Onset    Stroke Mother     Hypertension Father     Cancer Father     COPD Father        Physical Exam:    Vitals:     /78 (BP Location: Left arm, Patient Position: Sitting, Cuff Size: Large)   Pulse 71   Ht 6' 1\" (1.854 m)   Wt (!) 147 kg (324 lb)   SpO2 100%   BMI 42.75 kg/m²       Physical Exam  Constitutional:       General: He is not in acute distress.     Appearance: Normal appearance.   HENT:      Head: Normocephalic and atraumatic.      Mouth/Throat:      Mouth: Mucous membranes are moist.   Eyes:      General: No scleral icterus.     Extraocular Movements: Extraocular movements intact.   Neck:      Vascular: JVD (mildly elevated) present.   Cardiovascular:      Rate and Rhythm: Normal rate and regular rhythm.      Pulses: Normal pulses.      Heart sounds: S1 normal and S2 normal. No murmur heard.     No friction rub. No gallop.      Comments: Trace edema bilaterally  Pulmonary:      Effort: Pulmonary effort is normal.      Breath sounds: No wheezing, rhonchi or rales.   Abdominal:      General: There is no distension.      Palpations: Abdomen is soft.      Tenderness: There is no abdominal tenderness. There is no guarding or rebound.   Musculoskeletal:         General: Normal range of motion.      Cervical back: Neck supple.   Skin:     General: Skin is warm and dry.      Capillary Refill: Capillary refill takes less than 2 seconds.   Neurological:      General: No focal deficit present.      Mental Status: He is alert and oriented to person, place, and time.   Psychiatric:         Mood and Affect: Mood normal.         Labs & " Results:    Lab Results   Component Value Date    SODIUM 136 09/12/2024    K 4.3 09/12/2024    CL 99 09/12/2024    CO2 25 09/12/2024    BUN 24 09/12/2024    CREATININE 1.43 (H) 09/12/2024    GLUC 189 (H) 09/12/2024    CALCIUM 8.4 09/12/2024     Lab Results   Component Value Date    WBC 7.85 08/04/2024    HGB 10.7 (L) 08/04/2024    HCT 35.1 (L) 08/04/2024    MCV 83 08/04/2024     08/04/2024     Lab Results   Component Value Date    NTBNP 697 (H) 04/10/2023      Lab Results   Component Value Date    CHOLESTEROL 170 10/06/2023    CHOLESTEROL 165 11/22/2022    CHOLESTEROL 235 (H) 09/19/2022     Lab Results   Component Value Date    HDL 55 10/06/2023    HDL 55 11/22/2022    HDL 64 09/19/2022     Lab Results   Component Value Date    TRIG 146 10/06/2023    TRIG 127 11/22/2022    TRIG 198 (H) 09/19/2022     Lab Results   Component Value Date    NONHDLC 110 11/22/2022    NONHDLC 171 09/19/2022    NONHDLC 118 09/05/2022       EKG personally reviewed by Monica Wilcox.     I have spent a total time of 32 minutes in caring for this patient on the day of the visit/encounter including Instructions for management, Importance of tx compliance, Impressions, Counseling / Coordination of care, Documenting in the medical record, Reviewing / ordering tests, medicine, procedures  , and Obtaining or reviewing history  .    Thank you for the opportunity to participate in the care of this patient.    MONICA WILCOX MD  ADVANCED HEART FAILURE AND MECHANICAL CIRCULATORY SUPPORT  [facility]

## 2024-09-30 NOTE — TELEPHONE ENCOUNTER
Patient called office to check on CardioMems readings & to state he is contacting a  regarding a disability claim & wanted to inform the office.    Read to patient his CardioMems for over the past 2 weeks.    9/1 - 17 9/16 - 11 9/2 - 14                     9/17 - 14  9/3 - 14 9/18 -    9/4 - 12 9/19 - 20 9/5 - 12 9/20 - 15  9/6 - 12                     9/21 -    9/7 -                          9/22 -    9/8 -                          9/23 - 14  9/9 - 14                     9/24 - 13   9/10 - 15                   9/25 - 20   9/11 - 12                   9/26 - 15  9/12 - 13                   9/27 - 15  9/13 - 13                   9/28 -    9/14 -                        9/29 - 15  9/15 - 12    Patient verbalized understanding & stated he has an OV with Dr Wilcox tomorrow.

## 2024-10-01 ENCOUNTER — TELEPHONE (OUTPATIENT)
Dept: CARDIOLOGY CLINIC | Facility: CLINIC | Age: 59
End: 2024-10-01

## 2024-10-01 ENCOUNTER — OFFICE VISIT (OUTPATIENT)
Dept: CARDIOLOGY CLINIC | Facility: CLINIC | Age: 59
End: 2024-10-01
Payer: COMMERCIAL

## 2024-10-01 VITALS
HEIGHT: 73 IN | OXYGEN SATURATION: 100 % | HEART RATE: 71 BPM | BODY MASS INDEX: 41.75 KG/M2 | SYSTOLIC BLOOD PRESSURE: 118 MMHG | DIASTOLIC BLOOD PRESSURE: 78 MMHG | WEIGHT: 315 LBS

## 2024-10-01 DIAGNOSIS — I50.32 CHRONIC HEART FAILURE WITH PRESERVED EJECTION FRACTION (HCC): Primary | ICD-10-CM

## 2024-10-01 DIAGNOSIS — E78.2 MIXED HYPERLIPIDEMIA: ICD-10-CM

## 2024-10-01 DIAGNOSIS — I10 PRIMARY HYPERTENSION: ICD-10-CM

## 2024-10-01 DIAGNOSIS — N18.30 STAGE 3 CHRONIC KIDNEY DISEASE, UNSPECIFIED WHETHER STAGE 3A OR 3B CKD (HCC): ICD-10-CM

## 2024-10-01 DIAGNOSIS — G47.33 OBSTRUCTIVE SLEEP APNEA: ICD-10-CM

## 2024-10-01 PROCEDURE — 99214 OFFICE O/P EST MOD 30 MIN: CPT | Performed by: INTERNAL MEDICINE

## 2024-10-01 NOTE — PATIENT INSTRUCTIONS
Take metolazone dose today with additional potassium 40meq today  Continue rest of cardiac medications  Daily weights and [facility] transmissions  2g sodium diet  2L fluid restriction  Daily weights, call office for weight gain of 3 lbs in a day or 5 lbs in 5-7 days.  Sleep study per PCP

## 2024-10-01 NOTE — TELEPHONE ENCOUNTER
FYI:  Patient called to state he weighed himself when he got home from OV & his weight was up to 325 lbs.  Today's OV Wt - 324 lb.    Patient also stated he did a [facility] reading, which we checked on website & reading today is 14.    Patient will take a Metolazone 5 mg dose this afternoon as instructed by Dr Wilcox.    Will call patient tomorrow to F/U weight & symptoms.

## 2024-10-02 ENCOUNTER — TELEPHONE (OUTPATIENT)
Dept: CARDIOLOGY CLINIC | Facility: CLINIC | Age: 59
End: 2024-10-02

## 2024-10-02 NOTE — TELEPHONE ENCOUNTER
Returned call to patient & left a detailed message since he has given permission to do so, reading Dr Wilcox orders.    Also, left c/b number & stated we will f/u tomorrow.

## 2024-10-02 NOTE — TELEPHONE ENCOUNTER
Patient called today & stated his weight is up 5 lbs overnight.  Today's Wt - 330 lb.  Yesterday OV Wt - 325 lb  Yesterday Home Wt - 324 lb    Patient stated he did take the Metolazone 5 mg yesterday PM as instructed, before Bumex 6 mg dose. Stated he will probably take another Metolazone 5 mg this PM again with Bumex.    Stated his hands are swollen, & his wedding band is tight. Also stated his feet & ankles are a little swollen.   Denied abdominal bloating or abdominal pain as he usually experiences with fluid retention.    Today's Cardio[facility] reading - 16.    Please advise.

## 2024-10-03 ENCOUNTER — TELEPHONE (OUTPATIENT)
Dept: CARDIOLOGY CLINIC | Facility: CLINIC | Age: 59
End: 2024-10-03

## 2024-10-03 ENCOUNTER — CLINICAL SUPPORT (OUTPATIENT)
Dept: CARDIOLOGY CLINIC | Facility: CLINIC | Age: 59
End: 2024-10-03
Payer: COMMERCIAL

## 2024-10-03 VITALS
HEART RATE: 87 BPM | BODY MASS INDEX: 43.76 KG/M2 | OXYGEN SATURATION: 96 % | WEIGHT: 315 LBS | SYSTOLIC BLOOD PRESSURE: 106 MMHG | DIASTOLIC BLOOD PRESSURE: 68 MMHG

## 2024-10-03 DIAGNOSIS — I50.32 CHRONIC HEART FAILURE WITH PRESERVED EJECTION FRACTION (HCC): Primary | ICD-10-CM

## 2024-10-03 DIAGNOSIS — R60.0 LOWER LEG EDEMA: ICD-10-CM

## 2024-10-03 DIAGNOSIS — I50.30 DIASTOLIC CONGESTIVE HEART FAILURE, UNSPECIFIED HF CHRONICITY (HCC): Primary | ICD-10-CM

## 2024-10-03 PROCEDURE — RECHECK

## 2024-10-03 PROCEDURE — 96374 THER/PROPH/DIAG INJ IV PUSH: CPT

## 2024-10-03 RX ORDER — FUROSEMIDE 10 MG/ML
120 INJECTION INTRAMUSCULAR; INTRAVENOUS ONCE
Status: DISCONTINUED | OUTPATIENT
Start: 2024-10-03 | End: 2024-10-03

## 2024-10-03 RX ADMIN — FUROSEMIDE 120 MG: 10 INJECTION INTRAMUSCULAR; INTRAVENOUS at 17:09

## 2024-10-03 NOTE — TELEPHONE ENCOUNTER
Patient called stating his weight is up & he is having an increase in edema.    Today's Wt - 333 lb.    Took BP while on the phone with wrist cuff, left wrist. BP - 129/76. Pulse - 82.    Stated his feet & ankles are swollen & he could not get his shoes on this morning. Fingers & hands remain edematous.    Stated his abdomen is starting to hurt as it does when he has extra fluid.    Stated he took Metolazone 5 mg this AM with Bumex 2 mg due to fluid & weight gain.    Card[facility]'s reading today is 14.    Stated he doesn't know what to do.    Please advise.

## 2024-10-03 NOTE — TELEPHONE ENCOUNTER
Caller: Keon Cano    Doctor: Brenden    Reason for call: Patient returned your call. He will be there at 4:00pm today.    Thank you    Call back#: [phone]

## 2024-10-03 NOTE — PROGRESS NOTES
IV Lasix.     Pre Vitals prior to IV Lasix administration.     BP: 98/62  P: 87  O2: 96  WT: 331lbs

## 2024-10-03 NOTE — PROGRESS NOTES
Administered IV Lasix 125 mg via right antecubital, patient tolerated well.     D/c'd IV without incident & dressed.    After IV Lasix, BP - 106/68.    Patient unable to void.    Capri Bowen advised pt if he is unable to void in the next few hours, to go to ED for evaluation,    Will f/u tomorrow with patient post lasix for urine output & edema.    Patient verbalized understanding.

## 2024-10-03 NOTE — TELEPHONE ENCOUNTER
Called patient & advised him of Dr Wilcox orders.    Patient verbalized understanding & stated he needs to check with his wife for a ride & will call back.

## 2024-10-04 ENCOUNTER — TELEPHONE (OUTPATIENT)
Dept: CARDIOLOGY CLINIC | Facility: CLINIC | Age: 59
End: 2024-10-04

## 2024-10-04 ENCOUNTER — HOSPITAL ENCOUNTER (INPATIENT)
Facility: HOSPITAL | Age: 59
LOS: 6 days | Discharge: HOME | DRG: 291 | End: 2024-10-10
Attending: EMERGENCY MEDICINE | Admitting: STUDENT IN AN ORGANIZED HEALTH CARE EDUCATION/TRAINING PROGRAM
Payer: COMMERCIAL

## 2024-10-04 ENCOUNTER — APPOINTMENT (EMERGENCY)
Dept: RADIOLOGY | Facility: HOSPITAL | Age: 59
DRG: 291 | End: 2024-10-04
Payer: COMMERCIAL

## 2024-10-04 DIAGNOSIS — B35.1 ONYCHOMYCOSIS: ICD-10-CM

## 2024-10-04 DIAGNOSIS — E11.42 DIABETIC POLYNEUROPATHY ASSOCIATED WITH TYPE 2 DIABETES MELLITUS (HCC): ICD-10-CM

## 2024-10-04 DIAGNOSIS — E53.8 VITAMIN B12 DEFICIENCY: Chronic | ICD-10-CM

## 2024-10-04 DIAGNOSIS — E87.6 DIURETIC-INDUCED HYPOKALEMIA: ICD-10-CM

## 2024-10-04 DIAGNOSIS — R07.9 CHEST PAIN: ICD-10-CM

## 2024-10-04 DIAGNOSIS — E61.1 IRON DEFICIENCY: ICD-10-CM

## 2024-10-04 DIAGNOSIS — I50.32 CHRONIC HEART FAILURE WITH PRESERVED EJECTION FRACTION (HCC): ICD-10-CM

## 2024-10-04 DIAGNOSIS — I50.33 ACUTE ON CHRONIC DIASTOLIC CONGESTIVE HEART FAILURE (HCC): ICD-10-CM

## 2024-10-04 DIAGNOSIS — D64.9 ANEMIA: ICD-10-CM

## 2024-10-04 DIAGNOSIS — T50.2X5A DIURETIC-INDUCED HYPOKALEMIA: ICD-10-CM

## 2024-10-04 DIAGNOSIS — L60.3 NAIL DYSTROPHY: ICD-10-CM

## 2024-10-04 DIAGNOSIS — N18.31 STAGE 3A CHRONIC KIDNEY DISEASE (HCC): ICD-10-CM

## 2024-10-04 DIAGNOSIS — I50.9 ACUTE EXACERBATION OF CHF (CONGESTIVE HEART FAILURE) (HCC): Primary | ICD-10-CM

## 2024-10-04 DIAGNOSIS — R09.81 NASAL CONGESTION: ICD-10-CM

## 2024-10-04 DIAGNOSIS — E11.49 OTHER DIABETIC NEUROLOGICAL COMPLICATION ASSOCIATED WITH TYPE 2 DIABETES MELLITUS (HCC): ICD-10-CM

## 2024-10-04 LAB
2HR DELTA HS TROPONIN: -1 NG/L
4HR DELTA HS TROPONIN: 0 NG/L
ANION GAP SERPL CALCULATED.3IONS-SCNC: 14 MMOL/L (ref 4–13)
ANION GAP SERPL CALCULATED.3IONS-SCNC: 14 MMOL/L (ref 4–13)
ATRIAL RATE: 86 BPM
BASOPHILS # BLD AUTO: 0.05 THOUSANDS/ΜL (ref 0–0.1)
BASOPHILS NFR BLD AUTO: 1 % (ref 0–1)
BNP SERPL-MCNC: 470 PG/ML (ref 0–100)
BUN SERPL-MCNC: 28 MG/DL (ref 5–25)
BUN SERPL-MCNC: 30 MG/DL (ref 5–25)
CALCIUM SERPL-MCNC: 8 MG/DL (ref 8.4–10.2)
CALCIUM SERPL-MCNC: 8.1 MG/DL (ref 8.4–10.2)
CARDIAC TROPONIN I PNL SERPL HS: 14 NG/L
CARDIAC TROPONIN I PNL SERPL HS: 15 NG/L
CARDIAC TROPONIN I PNL SERPL HS: 15 NG/L
CHLORIDE SERPL-SCNC: 95 MMOL/L (ref 96–108)
CHLORIDE SERPL-SCNC: 99 MMOL/L (ref 96–108)
CO2 SERPL-SCNC: 22 MMOL/L (ref 21–32)
CO2 SERPL-SCNC: 25 MMOL/L (ref 21–32)
CREAT SERPL-MCNC: 1.4 MG/DL (ref 0.6–1.3)
CREAT SERPL-MCNC: 1.55 MG/DL (ref 0.6–1.3)
EOSINOPHIL # BLD AUTO: 0.38 THOUSAND/ΜL (ref 0–0.61)
EOSINOPHIL NFR BLD AUTO: 5 % (ref 0–6)
ERYTHROCYTE [DISTWIDTH] IN BLOOD BY AUTOMATED COUNT: 15.9 % (ref 11.6–15.1)
GFR SERPL CREATININE-BSD FRML MDRD: 48 ML/MIN/1.73SQ M
GFR SERPL CREATININE-BSD FRML MDRD: 55 ML/MIN/1.73SQ M
GLUCOSE SERPL-MCNC: 178 MG/DL (ref 65–140)
GLUCOSE SERPL-MCNC: 186 MG/DL (ref 65–140)
GLUCOSE SERPL-MCNC: 200 MG/DL (ref 65–140)
GLUCOSE SERPL-MCNC: 211 MG/DL (ref 65–140)
HCT VFR BLD AUTO: 37.5 % (ref 36.5–49.3)
HGB BLD-MCNC: 11.7 G/DL (ref 12–17)
IMM GRANULOCYTES # BLD AUTO: 0.04 THOUSAND/UL (ref 0–0.2)
IMM GRANULOCYTES NFR BLD AUTO: 1 % (ref 0–2)
LYMPHOCYTES # BLD AUTO: 0.7 THOUSANDS/ΜL (ref 0.6–4.47)
LYMPHOCYTES NFR BLD AUTO: 9 % (ref 14–44)
MCH RBC QN AUTO: 26.9 PG (ref 26.8–34.3)
MCHC RBC AUTO-ENTMCNC: 31.2 G/DL (ref 31.4–37.4)
MCV RBC AUTO: 86 FL (ref 82–98)
MONOCYTES # BLD AUTO: 0.57 THOUSAND/ΜL (ref 0.17–1.22)
MONOCYTES NFR BLD AUTO: 7 % (ref 4–12)
NEUTROPHILS # BLD AUTO: 6.35 THOUSANDS/ΜL (ref 1.85–7.62)
NEUTS SEG NFR BLD AUTO: 77 % (ref 43–75)
NRBC BLD AUTO-RTO: 0 /100 WBCS
P AXIS: 83 DEGREES
PLATELET # BLD AUTO: 280 THOUSANDS/UL (ref 149–390)
PMV BLD AUTO: 10.2 FL (ref 8.9–12.7)
POTASSIUM SERPL-SCNC: 4.1 MMOL/L (ref 3.5–5.3)
POTASSIUM SERPL-SCNC: 4.1 MMOL/L (ref 3.5–5.3)
PR INTERVAL: 148 MS
QRS AXIS: 20 DEGREES
QRSD INTERVAL: 78 MS
QT INTERVAL: 386 MS
QTC INTERVAL: 461 MS
RBC # BLD AUTO: 4.35 MILLION/UL (ref 3.88–5.62)
SODIUM SERPL-SCNC: 134 MMOL/L (ref 135–147)
SODIUM SERPL-SCNC: 135 MMOL/L (ref 135–147)
T WAVE AXIS: 50 DEGREES
VENTRICULAR RATE: 86 BPM
WBC # BLD AUTO: 8.09 THOUSAND/UL (ref 4.31–10.16)

## 2024-10-04 PROCEDURE — 71045 X-RAY EXAM CHEST 1 VIEW: CPT

## 2024-10-04 PROCEDURE — 36415 COLL VENOUS BLD VENIPUNCTURE: CPT

## 2024-10-04 PROCEDURE — 99285 EMERGENCY DEPT VISIT HI MDM: CPT | Performed by: EMERGENCY MEDICINE

## 2024-10-04 PROCEDURE — 99285 EMERGENCY DEPT VISIT HI MDM: CPT

## 2024-10-04 PROCEDURE — 96374 THER/PROPH/DIAG INJ IV PUSH: CPT

## 2024-10-04 PROCEDURE — 84484 ASSAY OF TROPONIN QUANT: CPT

## 2024-10-04 PROCEDURE — 85025 COMPLETE CBC W/AUTO DIFF WBC: CPT

## 2024-10-04 PROCEDURE — 83880 ASSAY OF NATRIURETIC PEPTIDE: CPT

## 2024-10-04 PROCEDURE — 82948 REAGENT STRIP/BLOOD GLUCOSE: CPT

## 2024-10-04 PROCEDURE — 93005 ELECTROCARDIOGRAM TRACING: CPT

## 2024-10-04 PROCEDURE — 93010 ELECTROCARDIOGRAM REPORT: CPT | Performed by: INTERNAL MEDICINE

## 2024-10-04 PROCEDURE — 99223 1ST HOSP IP/OBS HIGH 75: CPT | Performed by: STUDENT IN AN ORGANIZED HEALTH CARE EDUCATION/TRAINING PROGRAM

## 2024-10-04 PROCEDURE — 80048 BASIC METABOLIC PNL TOTAL CA: CPT

## 2024-10-04 PROCEDURE — 80048 BASIC METABOLIC PNL TOTAL CA: CPT | Performed by: STUDENT IN AN ORGANIZED HEALTH CARE EDUCATION/TRAINING PROGRAM

## 2024-10-04 RX ORDER — ACETAZOLAMIDE 500 MG/5ML
500 INJECTION, POWDER, LYOPHILIZED, FOR SOLUTION INTRAVENOUS ONCE
Status: COMPLETED | OUTPATIENT
Start: 2024-10-04 | End: 2024-10-04

## 2024-10-04 RX ORDER — INSULIN LISPRO 100 [IU]/ML
2-12 INJECTION, SOLUTION INTRAVENOUS; SUBCUTANEOUS
Status: DISCONTINUED | OUTPATIENT
Start: 2024-10-04 | End: 2024-10-10 | Stop reason: HOSPADM

## 2024-10-04 RX ORDER — WATER 10 ML/10ML
INJECTION INTRAMUSCULAR; INTRAVENOUS; SUBCUTANEOUS
Status: COMPLETED
Start: 2024-10-04 | End: 2024-10-04

## 2024-10-04 RX ORDER — NITROGLYCERIN 0.4 MG/1
0.4 TABLET SUBLINGUAL
Status: DISCONTINUED | OUTPATIENT
Start: 2024-10-04 | End: 2024-10-10 | Stop reason: HOSPADM

## 2024-10-04 RX ORDER — ACETAMINOPHEN 325 MG/1
650 TABLET ORAL EVERY 6 HOURS PRN
Status: DISCONTINUED | OUTPATIENT
Start: 2024-10-04 | End: 2024-10-10 | Stop reason: HOSPADM

## 2024-10-04 RX ORDER — INSULIN LISPRO 100 [IU]/ML
4-20 INJECTION, SOLUTION INTRAVENOUS; SUBCUTANEOUS
Status: DISCONTINUED | OUTPATIENT
Start: 2024-10-04 | End: 2024-10-04

## 2024-10-04 RX ORDER — ALBUTEROL SULFATE 90 UG/1
2 INHALANT RESPIRATORY (INHALATION) EVERY 6 HOURS PRN
Status: DISCONTINUED | OUTPATIENT
Start: 2024-10-04 | End: 2024-10-10 | Stop reason: HOSPADM

## 2024-10-04 RX ORDER — INSULIN LISPRO 100 [IU]/ML
2-12 INJECTION, SOLUTION INTRAVENOUS; SUBCUTANEOUS
Status: DISCONTINUED | OUTPATIENT
Start: 2024-10-04 | End: 2024-10-06

## 2024-10-04 RX ORDER — BUMETANIDE 0.25 MG/ML
1 INJECTION INTRAMUSCULAR; INTRAVENOUS CONTINUOUS
Status: DISCONTINUED | OUTPATIENT
Start: 2024-10-04 | End: 2024-10-04

## 2024-10-04 RX ORDER — BUDESONIDE AND FORMOTEROL FUMARATE DIHYDRATE 160; 4.5 UG/1; UG/1
2 AEROSOL RESPIRATORY (INHALATION) 2 TIMES DAILY
Status: DISCONTINUED | OUTPATIENT
Start: 2024-10-04 | End: 2024-10-10 | Stop reason: HOSPADM

## 2024-10-04 RX ORDER — ATORVASTATIN CALCIUM 10 MG/1
10 TABLET, FILM COATED ORAL DAILY
Status: DISCONTINUED | OUTPATIENT
Start: 2024-10-05 | End: 2024-10-10 | Stop reason: HOSPADM

## 2024-10-04 RX ORDER — METOPROLOL SUCCINATE 50 MG/1
50 TABLET, EXTENDED RELEASE ORAL DAILY
Status: DISCONTINUED | OUTPATIENT
Start: 2024-10-05 | End: 2024-10-10 | Stop reason: HOSPADM

## 2024-10-04 RX ORDER — PREGABALIN 50 MG/1
50 CAPSULE ORAL DAILY
Status: DISCONTINUED | OUTPATIENT
Start: 2024-10-05 | End: 2024-10-04

## 2024-10-04 RX ORDER — PREGABALIN 50 MG/1
50 CAPSULE ORAL
Status: DISCONTINUED | OUTPATIENT
Start: 2024-10-04 | End: 2024-10-10 | Stop reason: HOSPADM

## 2024-10-04 RX ORDER — POTASSIUM CHLORIDE 1500 MG/1
40 TABLET, EXTENDED RELEASE ORAL 2 TIMES DAILY
Status: DISCONTINUED | OUTPATIENT
Start: 2024-10-04 | End: 2024-10-10

## 2024-10-04 RX ORDER — FAMOTIDINE 20 MG/1
20 TABLET, FILM COATED ORAL DAILY
Status: DISCONTINUED | OUTPATIENT
Start: 2024-10-05 | End: 2024-10-10 | Stop reason: HOSPADM

## 2024-10-04 RX ORDER — BUMETANIDE 0.25 MG/ML
5 INJECTION, SOLUTION INTRAMUSCULAR; INTRAVENOUS ONCE
Status: COMPLETED | OUTPATIENT
Start: 2024-10-04 | End: 2024-10-04

## 2024-10-04 RX ORDER — BUMETANIDE 0.25 MG/ML
0.5 INJECTION INTRAMUSCULAR; INTRAVENOUS CONTINUOUS
Status: DISCONTINUED | OUTPATIENT
Start: 2024-10-04 | End: 2024-10-07

## 2024-10-04 RX ADMIN — INSULIN LISPRO 4 UNITS: 100 INJECTION, SOLUTION INTRAVENOUS; SUBCUTANEOUS at 22:54

## 2024-10-04 RX ADMIN — WATER 5 ML: 1 INJECTION INTRAMUSCULAR; INTRAVENOUS; SUBCUTANEOUS at 15:58

## 2024-10-04 RX ADMIN — PREGABALIN 50 MG: 50 CAPSULE ORAL at 22:53

## 2024-10-04 RX ADMIN — Medication 1.5 MG/HR: at 16:48

## 2024-10-04 RX ADMIN — APIXABAN 5 MG: 5 TABLET, FILM COATED ORAL at 18:56

## 2024-10-04 RX ADMIN — ACETAZOLAMIDE 500 MG: 500 INJECTION, POWDER, LYOPHILIZED, FOR SOLUTION INTRAVENOUS at 15:58

## 2024-10-04 RX ADMIN — POTASSIUM CHLORIDE 40 MEQ: 1500 TABLET, EXTENDED RELEASE ORAL at 18:56

## 2024-10-04 RX ADMIN — BUMETANIDE 5 MG: 0.25 INJECTION INTRAMUSCULAR; INTRAVENOUS at 14:45

## 2024-10-04 RX ADMIN — Medication 1.5 MG/HR: at 21:16

## 2024-10-04 RX ADMIN — INSULIN LISPRO 2 UNITS: 100 INJECTION, SOLUTION INTRAVENOUS; SUBCUTANEOUS at 16:33

## 2024-10-04 NOTE — ASSESSMENT & PLAN NOTE
Lab Results   Component Value Date    HGBA1C 8.3 (H) 06/19/2024       Recent Labs     10/04/24  1623   POCGLU 178*       Blood Sugar Average: Last 72 hrs:  (P) 178  Hold home antihyperglycemics and proceed with ISS  Consistent carb diet

## 2024-10-04 NOTE — ASSESSMENT & PLAN NOTE
Wt Readings from Last 3 Encounters:   10/03/24 (!) 150 kg (331 lb 11.2 oz)   10/01/24 (!) 147 kg (324 lb)   08/20/24 (!) 144 kg (317 lb)   This is a 59-year-old gentleman known to the cardiology and heart failure services for frequent exacerbations of his heart failure with preserved LVEF  Last echocardiogram in our system appears to demonstrate EF 55% in 6/20/24 (diastolic function could not be assessed due to afib)  He presents to the ED for increasing weight states that he is currently about 16 pounds above his dry weight of 319  States he is adherent to his medication regimen and lifestyle needs  His home dose of Bumex is a total of 18 mg p.o. daily (6 mg p.o. 3 times daily), and he also takes spironolactone  He had trial of IV Lasix 120 mg in cardiology office with little improvement and so was referred to the ED for further evaluation  No additional needs at present and patient is resting comfortably.  Current symptoms include increasing lower extremity edema, abdominal distention, and increasing dyspnea on exertion.  No chest pain  Admit and start Bumex drip at 1.5 mg/h.  Consulted heart failure team.  Follow labs tonight and in the morning and will aggressively replete electrolytes.  Monitor on telemetry

## 2024-10-04 NOTE — TELEPHONE ENCOUNTER
F/U call to patient post IV Lasix 125 mg, administered yesterday.    Patient stated his weight is up another 3 lbs.  Today's Wt - 335 lb.  Yesterday, 10/3, @ OV Wt - 331 lb 11.2 oz  Yesterday Home Wt - 333 lb  10/1 OV Wt - 324 lb    Dian[facility]'s Reading - 19    Patient stated he only voided x 2 since IV administration yesterday afternoon. Stated he feels about the same as yesterday except a little worse.    Patient wants to weight for Dr Wilcox advisteresa before going to ED.    Please advise.

## 2024-10-04 NOTE — TELEPHONE ENCOUNTER
HORACIOI:  Returned call to patient & read him Dr Wilcox instructions.    Patient verbalized understanding & stated he had called the ambulance, that was now @ his house, ready to take him to ED.

## 2024-10-04 NOTE — ASSESSMENT & PLAN NOTE
Lab Results   Component Value Date    EGFR 55 10/04/2024    EGFR 53 09/12/2024    EGFR 48 08/07/2024    CREATININE 1.40 (H) 10/04/2024    CREATININE 1.43 (H) 09/12/2024    CREATININE 1.56 (H) 08/07/2024   Renal function appears baseline, monitor renal function and electrolytes closely while on diuretic infusion

## 2024-10-04 NOTE — ED PROVIDER NOTES
Final diagnoses:   Acute exacerbation of CHF (congestive heart failure) ([facility])     ED Disposition       ED Disposition   Admit    Condition   Stable    Date/Time   Fri Oct 4, 2024  3:17 PM    Comment                  Assessment & Plan       Medical Decision Making  Amount and/or Complexity of Data Reviewed  Labs: ordered.  Radiology: ordered.    Risk  Prescription drug management.  Decision regarding hospitalization.    Pt is a 59 Y O M, hx of multiple medical comorbidities including CHF, on 18mg  of bumex at home presents for fluids retention in the last 2 weeks, pt also received 1 dose of iv lasix at outpatient cardiology office yesterday without much relief. Tends to retain fluid in his abdomen and bilateral lower extremities.  Currently has mild shortness of breath.  Patient also gained 16 pounds in the last 2 weeks from his dry weight.  Denies chest pain, viral syndrome like symptoms, fever, pleuritic chest pain, abdominal pain, urinary symptoms.    Initial presentation pt is starting well on room air, speaking in full sentences, appears volume overloaded    On exam   General: VSS, volume overloaded  Head: Normocephalic, atraumatic, nontender.  Eyes: EOM-No subconjunctival hemorrhages.  ENT: Nose atraumatic. MMM  No malocclusion. No stridor. Normal phonation. No drooling. Normal swallowing.   Neck: Trachea midline. No JVD.  CV: RRR.   Lungs: Mild crackles in bilateral bases, no increased work of breathing, no tachypnea  Abd: +BS, soft, nontender, distended secondary to fluid overload. No guarding/rigidity.   MSK: Full ROM throughout.  Bilateral lower extremity edema.  Skin: Dry, intact.   Neuro: AAOx3, GCS 15, CN II-XII grossly intact. Motor/sensory grossly intact.  Psychiatric/Behavioral: mood/affect normal; behavior normal; thought content normal; judgement normal   Exam: deferred      Ddx: Acute exacerbation of chronic diastolic heart failure.  Will get cardiac workup and chest x-ray to rule out underlying  pneumonia, ischemia, electrolyte abnormalities, ANTONIO    Plan: Patient was evaluated with cardiac workup and chest x-ray, BNP and given a dose of IV Bumex 5 mg and 1 dose of Diamox 500 mg.  While in the ED he had 500 mL urinary output.  He was stable.  Admitted to medicine for further evaluation and management.    Labs as interpreted by me delta Trope normal, creatinine.  EKG as interpreted by me no acute ST changes  Imaging as interpreted by me no focal consolidation, no acute cardiopulmonary abnormality      ED Course as of 10/05/24 1022   Fri Oct 04, 2024   1515 Texted SLIM       Medications   insulin lispro (HumALOG/ADMELOG) 100 units/mL subcutaneous injection 2-12 Units (2 Units Subcutaneous Given 10/4/24 1633)   insulin lispro (HumALOG/ADMELOG) 100 units/mL subcutaneous injection 2-12 Units (has no administration in time range)   bumetanide (BUMEX) 12.5 mg infusion 50 mL (1.5 mg/hr Intravenous New Bag 10/4/24 1648)   insulin lispro (HumALOG/ADMELOG) 100 units/mL subcutaneous injection 2-12 Units ( Subcutaneous Not Given 10/4/24 1634)   acetaminophen (TYLENOL) tablet 650 mg (has no administration in time range)   albuterol (PROVENTIL HFA,VENTOLIN HFA) inhaler 2 puff (has no administration in time range)   atorvastatin (LIPITOR) tablet 10 mg (has no administration in time range)   budesonide-formoterol (SYMBICORT) 160-4.5 mcg/act inhaler 2 puff (has no administration in time range)   apixaban (ELIQUIS) tablet 5 mg (5 mg Oral Given 10/4/24 1856)   famotidine (PEPCID) tablet 20 mg (has no administration in time range)   metoprolol succinate (TOPROL-XL) 24 hr tablet 50 mg (has no administration in time range)   nitroglycerin (NITROSTAT) SL tablet 0.4 mg (has no administration in time range)   umeclidinium 62.5 mcg/actuation inhaler AEPB 1 puff (has no administration in time range)   potassium chloride (Klor-Con M20) CR tablet 40 mEq (40 mEq Oral Given 10/4/24 1856)   bumetanide (BUMEX) injection 5 mg (5 mg  Intravenous Given 10/4/24 1445)   acetaZOLAMIDE (DIAMOX) injection 500 mg (500 mg Intravenous Given 10/4/24 1558)   sterile water injection **ADS Override Pull** (5 mL  Given 10/4/24 1558)       ED Risk Strat Scores                           SBIRT 20yo+      Flowsheet Row Most Recent Value   Initial Alcohol Screen: US AUDIT-C     1. How often do you have a drink containing alcohol? 0 Filed at: 10/04/2024 1331   2. How many drinks containing alcohol do you have on a typical day you are drinking?  0 Filed at: 10/04/2024 1331   3a. Male UNDER 65: How often do you have five or more drinks on one occasion? 0 Filed at: 10/04/2024 1331   3b. FEMALE Any Age, or MALE 65+: How often do you have 4 or more drinks on one occassion? 0 Filed at: 10/04/2024 1331   Audit-C Score 0 Filed at: 10/04/2024 1331   TASH: How many times in the past year have you...    Used an illegal drug or used a prescription medication for non-medical reasons? Never Filed at: 10/04/2024 1331                            History of Present Illness       Chief Complaint   Patient presents with    Shortness of Breath     Came from home. C/o of worsening sob and chest pain for the past week. Compliant with meds. 16lb weight gain in 2 weeks. Hx of CHF       Past Medical History:   Diagnosis Date    Acute gastritis without hemorrhage     last assessed 3/24/17    Asthma     Atrial fibrillation (HCC)     Bilateral leg edema 02/19/2020    CHF (congestive heart failure) (HCC)     Coronary artery disease     Daytime sleepiness 07/17/2019    Diabetes mellitus (HCC)     Dyspnea on exertion 10/11/2021    Edema of both feet 01/23/2020    Gastric bypass status for obesity     Hyperlipidemia     Hypertension     Hypokalemia 11/14/2021    Impaired fasting glucose     last assessed 5/10/17    Knee sprain, bilateral 06/14/2018    Leg cramps 06/16/2022    Obesity     Viral gastroenteritis     last assessed 11/4/16      Past Surgical History:   Procedure Laterality Date     CARDIAC CATHETERIZATION N/A 3/9/2022    Procedure: CARDIAC RHC W/ PRESSURE SENSOR;  Surgeon: Aminata Wilcox MD;  Location: [facility];  Service: Cardiology    CARDIAC CATHETERIZATION N/A 9/6/2022    Procedure: Cardiac catheterization;  Surgeon: Yolanda Del Rosario DO;  [facility]: [facility];  Service: Cardiology    CARDIAC CATHETERIZATION N/A 9/6/2022    Procedure: Cardiac Coronary Angiogram;  Surgeon: Yolanda Del Rosario DO;  [facility]: [facility];  Service: Cardiology    CARDIAC CATHETERIZATION N/A 10/11/2023    Procedure: Cardiac RHC;  Surgeon: Yoel Darden MD;  Location: [facility];  Service: Cardiology    CARPAL TUNNEL RELEASE      bilateral    CHOLECYSTECTOMY LAPAROSCOPIC N/A 2/7/2024    Procedure: CHOLECYSTECTOMY LAPAROSCOPIC;  Surgeon: Nena Ferguson MD;  Location: [facility];  Service: General    GASTRIC BYPASS  2004    with han en y    HERNIA REPAIR      ventral inscisional    IR BIOPSY BONE MARROW  12/14/2023    LAPAROSCOPIC TRANSGASTRIC ACCESS FOR ERCP N/A 2/7/2024    Procedure: LAPAROSCOPIC TRANSGASTRIC ACCESS FOR ERCP;  Surgeon: Nena Ferguson MD;  Location: [facility];  Service: General    LAPAROSCOPIC TRANSGASTRIC ACCESS FOR ERCP N/A 2/7/2024    Procedure: ERCP, sphincterotomy, stone extraction;  Surgeon: Rey Ferguson MD;  Location: [facility];  Service: Gastroenterology    TONSILLECTOMY        Family History   Problem Relation Age of Onset    Stroke Mother     Hypertension Father     Cancer Father     COPD Father       Social History     Tobacco Use    Smoking status: Former     Current packs/day: 1.00     Average packs/day: 1 pack/day for 5.0 years (5.0 ttl pk-yrs)     Types: Pipe, Cigars, Cigarettes     Start date: 2016     Quit date: 1/1/2021     Passive exposure: Never    Smokeless tobacco: Former    Tobacco comments:     cigar once a day   Vaping Use    Vaping status: Never Used   Substance Use Topics    Alcohol use: Yes     Alcohol/week: 2.0  standard drinks of alcohol     Types: 2 Shots of liquor per week     Comment: social    Drug use: No      E-Cigarette/Vaping    E-Cigarette Use Never User       E-Cigarette/Vaping Substances    Nicotine No     THC No     CBD No     Flavoring No     Other No     Unknown No       I have reviewed and agree with the history as documented.     HPI    Review of Systems        Objective       ED Triage Vitals   Temperature Pulse Blood Pressure Respirations SpO2 Patient Position - Orthostatic VS   10/04/24 1333 10/04/24 1330 10/04/24 1330 10/04/24 1330 10/04/24 1330 10/04/24 1337   97.8 °F (36.6 °C) 89 141/70 18 97 % Lying      Temp Source Heart Rate Source BP Location FiO2 (%) Pain Score    10/04/24 1333 10/04/24 1330 10/04/24 1337 -- 10/04/24 2100    Oral Monitor Right arm  No Pain      Vitals      Date and Time Temp Pulse SpO2 Resp BP Pain Score FACES Pain Rating User   10/05/24 0700 98 °F (36.7 °C) 76 -- 20 144/79 -- -- CC   10/05/24 0233 98.4 °F (36.9 °C) 105 99 % 18 125/68 -- -- LS   10/04/24 2257 98.4 °F (36.9 °C) 84 98 % 18 127/70 -- -- LS   10/04/24 2100 -- -- -- -- -- No Pain -- LH   10/04/24 2040 98.4 °F (36.9 °C) 95 97 % 18 134/75 -- -- LS   10/04/24 1930 -- 86 95 % 16 121/65 -- -- PT   10/04/24 1900 -- 88 96 % 20 132/87 -- -- MS   10/04/24 1635 -- 84 96 % 22 114/66 -- -- MIGNON   10/04/24 1559 -- 84 97 % 20 118/68 -- -- MIGNON   10/04/24 1337 -- 84 97 % 20 141/70 -- -- EM   10/04/24 1333 97.8 °F (36.6 °C) -- -- -- -- -- -- KM   10/04/24 1330 -- 89 97 % 18 141/70 -- -- KM            Physical Exam    Results Reviewed       Procedure Component Value Units Date/Time    Basic metabolic panel [[id number]]  (Abnormal) Collected: 10/05/24 0444    Lab Status: Final result Specimen: Blood from Arm, Left Updated: 10/05/24 0612     Sodium 135 mmol/L      Potassium 3.3 mmol/L      Chloride 93 mmol/L      CO2 29 mmol/L      ANION GAP 13 mmol/L      BUN 31 mg/dL      Creatinine 1.81 mg/dL      Glucose 151 mg/dL      Calcium 8.2 mg/dL       eGFR 40 ml/min/1.73sq m     Narrative:      National Kid[employer] Foundation guidelines for Chronic Kidney Disease (CKD):     Stage 1 with normal or high GFR (GFR > 90 mL/min/1.73 square meters)    Stage 2 Mild CKD (GFR = 60-89 mL/min/1.73 square meters)    Stage 3A Moderate CKD (GFR = 45-59 mL/min/1.73 square meters)    Stage 3B Moderate CKD (GFR = 30-44 mL/min/1.73 square meters)    Stage 4 Severe CKD (GFR = 15-29 mL/min/1.73 square meters)    Stage 5 End Stage CKD (GFR <15 mL/min/1.73 square meters)  Note: GFR calculation is accurate only with a steady state creatinine    Magnesium [[id number]]  (Normal) Collected: 10/05/24 0444    Lab Status: Final result Specimen: Blood from Arm, Left Updated: 10/05/24 0612     Magnesium 2.0 mg/dL     Phosphorus [[id number]]  (Normal) Collected: 10/05/24 0444    Lab Status: Final result Specimen: Blood from Arm, Left Updated: 10/05/24 0612     Phosphorus 3.9 mg/dL     CBC and Platelet [[id number]]  (Abnormal) Collected: 10/05/24 0444    Lab Status: Final result Specimen: Blood from Arm, Left Updated: 10/05/24 0555     WBC 8.01 Thousand/uL      RBC 4.20 Million/uL      Hemoglobin 11.2 g/dL      Hematocrit 36.7 %      MCV 87 fL      MCH 26.7 pg      MCHC 30.5 g/dL      RDW 15.9 %      Platelets 268 Thousands/uL      MPV 10.2 fL     Basic metabolic panel [[id number]]  (Abnormal) Collected: 10/04/24 2057    Lab Status: Final result Specimen: Blood from Arm, Right Updated: 10/04/24 2132     Sodium 134 mmol/L      Potassium 4.1 mmol/L      Chloride 95 mmol/L      CO2 25 mmol/L      ANION GAP 14 mmol/L      BUN 30 mg/dL      Creatinine 1.55 mg/dL      Glucose 186 mg/dL      Calcium 8.0 mg/dL      eGFR 48 ml/min/1.73sq m     Narrative:      National Kid[employer] Disease Foundation guidelines for Chronic Kidney Disease (CKD):     Stage 1 with normal or high GFR (GFR > 90 mL/min/1.73 square meters)    Stage 2 Mild CKD (GFR = 60-89 mL/min/1.73 square meters)    Stage 3A Moderate CKD (GFR =  45-59 mL/min/1.73 square meters)    Stage 3B Moderate CKD (GFR = 30-44 mL/min/1.73 square meters)    Stage 4 Severe CKD (GFR = 15-29 mL/min/1.73 square meters)    Stage 5 End Stage CKD (GFR <15 mL/min/1.73 square meters)  Note: GFR calculation is accurate only with a steady state creatinine    HS Troponin I 4hr [[id number]]  (Normal) Collected: 10/04/24 1900    Lab Status: Final result Specimen: Blood from Arm, Right Updated: 10/04/24 1939     hs TnI 4hr 15 ng/L      Delta 4hr hsTnI 0 ng/L     HS Troponin I 2hr [[id number]]  (Normal) Collected: 10/04/24 1617    Lab Status: Final result Specimen: Blood from Arm, Right Updated: 10/04/24 1650     hs TnI 2hr 14 ng/L      Delta 2hr hsTnI -1 ng/L     Fingerstick Glucose (POCT) [[id number]]  (Abnormal) Collected: 10/04/24 1623    Lab Status: Final result Specimen: Blood Updated: 10/04/24 1624     POC Glucose 178 mg/dl     B-Type Natriuretic Peptide(BNP) [[id number]]  (Abnormal) Collected: 10/04/24 1400    Lab Status: Final result Specimen: Blood from Arm, Right Updated: 10/04/24 1508      pg/mL     HS Troponin 0hr (reflex protocol) [[id number]]  (Normal) Collected: 10/04/24 1400    Lab Status: Final result Specimen: Blood from Arm, Right Updated: 10/04/24 1442     hs TnI 0hr 15 ng/L     Basic metabolic panel [[id number]]  (Abnormal) Collected: 10/04/24 1400    Lab Status: Final result Specimen: Blood from Arm, Right Updated: 10/04/24 1434     Sodium 135 mmol/L      Potassium 4.1 mmol/L      Chloride 99 mmol/L      CO2 22 mmol/L      ANION GAP 14 mmol/L      BUN 28 mg/dL      Creatinine 1.40 mg/dL      Glucose 211 mg/dL      Calcium 8.1 mg/dL      eGFR 55 ml/min/1.73sq m     Narrative:      National Kid[employer] Disease Foundation guidelines for Chronic Kidney Disease (CKD):     Stage 1 with normal or high GFR (GFR > 90 mL/min/1.73 square meters)    Stage 2 Mild CKD (GFR = 60-89 mL/min/1.73 square meters)    Stage 3A Moderate CKD (GFR = 45-59 mL/min/1.73 square meters)    Stage  3B Moderate CKD (GFR = 30-44 mL/min/1.73 square meters)    Stage 4 Severe CKD (GFR = 15-29 mL/min/1.73 square meters)    Stage 5 End Stage CKD (GFR <15 mL/min/1.73 square meters)  Note: GFR calculation is accurate only with a steady state creatinine    CBC and differential [[id number]]  (Abnormal) Collected: 10/04/24 1400    Lab Status: Final result Specimen: Blood from Arm, Right Updated: 10/04/24 1418     WBC 8.09 Thousand/uL      RBC 4.35 Million/uL      Hemoglobin 11.7 g/dL      Hematocrit 37.5 %      MCV 86 fL      MCH 26.9 pg      MCHC 31.2 g/dL      RDW 15.9 %      MPV 10.2 fL      Platelets 280 Thousands/uL      nRBC 0 /100 WBCs      Segmented % 77 %      Immature Grans % 1 %      Lymphocytes % 9 %      Monocytes % 7 %      Eosinophils Relative 5 %      Basophils Relative 1 %      Absolute Neutrophils 6.35 Thousands/µL      Absolute Immature Grans 0.04 Thousand/uL      Absolute Lymphocytes 0.70 Thousands/µL      Absolute Monocytes 0.57 Thousand/µL      Eosinophils Absolute 0.38 Thousand/µL      Basophils Absolute 0.05 Thousands/µL             XR chest portable   Final Interpretation by Carrie Marie MD (10/04 1531)      No acute cardiopulmonary disease.            Workstation performed: [id number]             Procedures    ED Medication and Procedure Management   Prior to Admission Medications   Prescriptions Last Dose Informant Patient Reported? Taking?   Accu-Chek FastClix Lancets MISC  Self No No   Sig: Test blood sugar twice daily   Patient taking differently: Takes blood sugar three times a week provider aware   Blood Glucose Monitoring Suppl (Accu-Chek Guide) w/Device KIT  Self No No   Sig: Use 2 (two) times a day Test blood sugar twice daily   EPINEPHrine (EPIPEN) 0.3 mg/0.3 mL SOAJ  Self No No   Sig: Inject 0.3 mL (0.3 mg total) into a muscle once for 1 dose   Eliquis 5 MG  Self No No   Sig: TAKE 1 TABLET BY MOUTH TWICE A DAY   Empagliflozin (JARDIANCE) 10 MG TABS tablet  Self No No   Sig: Take  1 tablet (10 mg total) by mouth daily   Klor-Con M20 20 MEQ tablet  Self No No   Sig: TAKE 2 TABLETS BY MOUTH 2 TIMES A DAY.   acetaminophen (TYLENOL) 325 mg tablet  Self No No   Sig: Take 2 tablets (650 mg total) by mouth every 6 (six) hours as needed for mild pain, headaches or fever   albuterol (PROVENTIL HFA,VENTOLIN HFA) 90 mcg/act inhaler  Self No No   Sig: INHALE 2 PUFFS EVERY 4 HOURS AS NEEDED FOR WHEEZING OR SHORTNESS OF BREATH   aluminum-magnesium hydroxide 200-200 MG/5ML suspension  Self No No   Sig: Take 5 mL by mouth every 6 (six) hours as needed for heartburn   atorvastatin (LIPITOR) 10 mg tablet  Self No No   Sig: TAKE 1 TABLET BY MOUTH EVERY DAY   budesonide-formoterol (Symbicort) 160-4.5 mcg/act inhaler  Self No No   Sig: Inhale 2 puffs 2 (two) times a day Rinse mouth after use.   bumetanide (BUMEX) 2 mg tablet  Self No No   Sig: Take 3 tablets (6 mg total) by mouth 3 (three) times a day   famotidine (PEPCID) 20 mg tablet  Self No No   Sig: Take 1 tablet (20 mg total) by mouth 2 (two) times a day   Patient taking differently: Take 20 mg by mouth if needed for heartburn As needed twice a day   fluticasone (FLONASE) 50 mcg/act nasal spray  Self No No   Si spray into each nostril 2 (two) times a day   loratadine (CLARITIN) 10 mg tablet  Self No No   Sig: Take 1 tablet (10 mg total) by mouth daily Do not start before 2023.   Patient taking differently: Take 10 mg by mouth if needed for allergies As needed daily   metolazone (ZAROXOLYN) 2.5 mg tablet  Self No No   Sig: TAKE 2 TABLETS (5 MG TOTAL) BY MOUTH IF NEEDED (WEIGHT GAIN OF 3+ LBS IN 24 HOURS, 5+ LBS IN ONE WEEK OR SIGNS OF WORSENING FLUID RETENTION) TAKE 20-30 MINUTES BEFORE BUMEX DOSE AND WITH ADDITIONAL POTASSIUM   Patient not taking: Reported on 10/1/2024   metoprolol succinate (TOPROL-XL) 50 mg 24 hr tablet  Self No No   Sig: Take 1 tablet (50 mg total) by mouth daily   montelukast (SINGULAIR) 10 mg tablet  Self No No   Sig:  Take 1 tablet (10 mg total) by mouth daily at bedtime   nitroglycerin (NITROSTAT) 0.4 mg SL tablet  Self No No   Sig: Place 1 tablet (0.4 mg total) under the tongue every 5 (five) minutes as needed for chest pain for up to 50 doses   omeprazole (PriLOSEC) 20 mg delayed release capsule   No No   Sig: Take 1 capsule (20 mg total) by mouth daily for 14 days   Patient not taking: Reported on 8/8/2024   pregabalin (LYRICA) 50 mg capsule  Self No No   Sig: Take 1 caps. at bedtime   sitaGLIPtin (Januvia) 100 mg tablet  Self No No   Sig: TAKE 1/2 TABLET BY MOUTH EVERY DAY   spironolactone (ALDACTONE) 25 mg tablet  Self No No   Sig: TAKE 1 TABLET (25 MG TOTAL) BY MOUTH DAILY.   tiotropium (Spiriva Respimat) 1.25 MCG/ACT AERS inhaler  Self No No   Sig: Inhale 2 puffs daily      Facility-Administered Medications Last Administration Doses Remaining   furosemide (LASIX) injection 120 mg 10/3/2024  5:09 PM 0        Current Discharge Medication List        CONTINUE these medications which have NOT CHANGED    Details   Accu-Chek FastClix Lancets MISC Test blood sugar twice daily  Qty: 200 each, Refills: 3    Associated Diagnoses: Type 2 diabetes mellitus with hyperglycemia, without long-term current use of insulin (HCC)      acetaminophen (TYLENOL) 325 mg tablet Take 2 tablets (650 mg total) by mouth every 6 (six) hours as needed for mild pain, headaches or fever    Associated Diagnoses: Acute gallstone pancreatitis; Choledocholithiasis      albuterol (PROVENTIL HFA,VENTOLIN HFA) 90 mcg/act inhaler INHALE 2 PUFFS EVERY 4 HOURS AS NEEDED FOR WHEEZING OR SHORTNESS OF BREATH  Qty: 18 g, Refills: 5    Comments: Substitution to a formulary equivalent within the same pharmaceutical class is authorized.  Associated Diagnoses: Mild intermittent asthma without complication      aluminum-magnesium hydroxide 200-200 MG/5ML suspension Take 5 mL by mouth every 6 (six) hours as needed for heartburn  Qty: 355 mL, Refills: 0    Associated  Diagnoses: Chest pain      atorvastatin (LIPITOR) 10 mg tablet TAKE 1 TABLET BY MOUTH EVERY DAY  Qty: 90 tablet, Refills: 3    Associated Diagnoses: Mixed hyperlipidemia      Blood Glucose Monitoring Suppl (Accu-Chek Guide) w/Device KIT Use 2 (two) times a day Test blood sugar twice daily  Qty: 1 kit, Refills: 0    Associated Diagnoses: Type 2 diabetes mellitus with hyperglycemia, without long-term current use of insulin ([facility])      budesonide-formoterol (Symbicort) 160-4.5 mcg/act inhaler Inhale 2 puffs 2 (two) times a day Rinse mouth after use.  Qty: 30.6 g, Refills: 2    Associated Diagnoses: Moderate persistent asthma with acute exacerbation      bumetanide (BUMEX) 2 mg tablet Take 3 tablets (6 mg total) by mouth 3 (three) times a day  Qty: 270 tablet, Refills: 0    Associated Diagnoses: Chronic heart failure with preserved ejection fraction ([facility])      Eliquis 5 MG TAKE 1 TABLET BY MOUTH TWICE A DAY  Qty: 60 tablet, Refills: 5    Associated Diagnoses: A-fib ([facility]); Atrial fibrillation, unspecified type ([facility])      Empagliflozin (JARDIANCE) 10 MG TABS tablet Take 1 tablet (10 mg total) by mouth daily  Qty: 30 tablet, Refills: 11    Associated Diagnoses: Acute on chronic right-sided heart failure ([facility]); Type 2 diabetes mellitus with stage 3b chronic kidney disease, without long-term current use of insulin ([facility])      EPINEPHrine (EPIPEN) 0.3 mg/0.3 mL SOAJ Inject 0.3 mL (0.3 mg total) into a muscle once for 1 dose  Qty: 0.6 mL, Refills: 0    Associated Diagnoses: Anaphylaxis, initial encounter      famotidine (PEPCID) 20 mg tablet Take 1 tablet (20 mg total) by mouth 2 (two) times a day  Qty: 30 tablet, Refills: 0    Associated Diagnoses: Chest pain      fluticasone (FLONASE) 50 mcg/act nasal spray 1 spray into each nostril 2 (two) times a day  Refills: 0    Associated Diagnoses: Nasal congestion      Klor-Con M20 20 MEQ tablet TAKE 2 TABLETS BY MOUTH 2 TIMES A DAY.  Qty: 360 tablet, Refills: 2    Associated  Diagnoses: Diuretic-induced hypokalemia      loratadine (CLARITIN) 10 mg tablet Take 1 tablet (10 mg total) by mouth daily Do not start before October 13, 2023.  Refills: 0    Associated Diagnoses: Nasal congestion      metolazone (ZAROXOLYN) 2.5 mg tablet TAKE 2 TABLETS (5 MG TOTAL) BY MOUTH IF NEEDED (WEIGHT GAIN OF 3+ LBS IN 24 HOURS, 5+ LBS IN ONE WEEK OR SIGNS OF WORSENING FLUID RETENTION) TAKE 20-30 MINUTES BEFORE BUMEX DOSE AND WITH ADDITIONAL POTASSIUM  Qty: 180 tablet, Refills: 1    Associated Diagnoses: Chronic heart failure with preserved ejection fraction (HCC)      metoprolol succinate (TOPROL-XL) 50 mg 24 hr tablet Take 1 tablet (50 mg total) by mouth daily  Qty: 30 tablet, Refills: 5    Associated Diagnoses: Chronic heart failure with preserved ejection fraction (HCC)      montelukast (SINGULAIR) 10 mg tablet Take 1 tablet (10 mg total) by mouth daily at bedtime  Qty: 90 tablet, Refills: 1    Associated Diagnoses: Moderate persistent asthma with acute exacerbation      nitroglycerin (NITROSTAT) 0.4 mg SL tablet Place 1 tablet (0.4 mg total) under the tongue every 5 (five) minutes as needed for chest pain for up to 50 doses  Qty: 50 tablet, Refills: 0    Associated Diagnoses: Chest pain      omeprazole (PriLOSEC) 20 mg delayed release capsule Take 1 capsule (20 mg total) by mouth daily for 14 days  Qty: 14 capsule, Refills: 0    Associated Diagnoses: Chest pain      pregabalin (LYRICA) 50 mg capsule Take 1 caps. at bedtime  Qty: 30 capsule, Refills: 5    Associated Diagnoses: Diabetic polyneuropathy associated with type 2 diabetes mellitus (HCC)      sitaGLIPtin (Januvia) 100 mg tablet TAKE 1/2 TABLET BY MOUTH EVERY DAY  Qty: 15 tablet, Refills: 5    Associated Diagnoses: Type 2 diabetes mellitus with stage 3b chronic kidney disease, without long-term current use of insulin ([facility])      spironolactone (ALDACTONE) 25 mg tablet TAKE 1 TABLET (25 MG TOTAL) BY MOUTH DAILY.  Qty: 90 tablet, Refills: 1     Associated Diagnoses: Chronic heart failure with preserved ejection fraction (HFpEF) ([facility])      tiotropium (Spiriva Respimat) 1.25 MCG/ACT AERS inhaler Inhale 2 puffs daily  Qty: 4 g, Refills: 0    Associated Diagnoses: Moderate persistent asthma with acute exacerbation           No discharge procedures on file.  ED SEPSIS DOCUMENTATION   Time reflects when diagnosis was documented in both MDM as applicable and the Disposition within this note       Time User Action Codes Description Comment    10/4/2024  3:17 PM Sarah Vegas Add [I50.9] Acute exacerbation of CHF (congestive heart failure) ([facility])     10/5/2024  9:13 AM Gabby Espitia Add [L60.3] Nail dystrophy                  Sarah Vegas DO  10/05/24 1027

## 2024-10-04 NOTE — H&P
H&P - Hospitalist   Name: Mesfin Cano 59 y.o. male I MRN: [id number]  Unit/Bed#: ED 08 I Date of Admission: 10/4/2024   Date of Service: 10/4/2024 I Hospital Day: 0     Assessment & Plan  Acute on chronic diastolic congestive heart failure (HCC)  Wt Readings from Last 3 Encounters:   10/03/24 (!) 150 kg (331 lb 11.2 oz)   10/01/24 (!) 147 kg (324 lb)   08/20/24 (!) 144 kg (317 lb)   This is a 59-year-old gentleman known to the cardiology and heart failure services for frequent exacerbations of his heart failure with preserved LVEF  Last echocardiogram in our system appears to demonstrate EF 55% in 6/20/24 (diastolic function could not be assessed due to afib)  He presents to the ED for increasing weight states that he is currently about 16 pounds above his dry weight of 319  States he is adherent to his medication regimen and lifestyle needs  His home dose of Bumex is a total of 18 mg p.o. daily (6 mg p.o. 3 times daily), and he also takes spironolactone  He had trial of IV Lasix 120 mg in cardiology office with little improvement and so was referred to the ED for further evaluation  No additional needs at present and patient is resting comfortably.  Current symptoms include increasing lower extremity edema, abdominal distention, and increasing dyspnea on exertion.  No chest pain  Admit and start Bumex drip at 1.5 mg/h.  Consulted heart failure team.  Follow labs tonight and in the morning and will aggressively replete electrolytes.  Monitor on telemetry  Primary hypertension  Resume Toprol XL and monitor BP closely while on diuretic gtt  Hyperlipidemia  Resume atorvastatin 10 mg daily  Morbid obesity (HCC)  Patient would strongly benefit from weight loss therapy  VICKY (obstructive sleep apnea)  Not currently qualified for CPAP therapy, recommend outpt followup  Paroxysmal atrial fibrillation (HCC)  Rate controlled on Metoprolol Succinate  Anticoagulated on eliquis  Monitor on telemetry  Stage 3a chronic kidney  disease (HCC)  Lab Results   Component Value Date    EGFR 55 10/04/2024    EGFR 53 09/12/2024    EGFR 48 08/07/2024    CREATININE 1.40 (H) 10/04/2024    CREATININE 1.43 (H) 09/12/2024    CREATININE 1.56 (H) 08/07/2024   Renal function appears baseline, monitor renal function and electrolytes closely while on diuretic infusion  Diabetic polyneuropathy associated with type 2 diabetes mellitus (HCC)  Lab Results   Component Value Date    HGBA1C 8.3 (H) 06/19/2024       Recent Labs     10/04/24  1623   POCGLU 178*       Blood Sugar Average: Last 72 hrs:  (P) 178  Hold home antihyperglycemics and proceed with ISS  Consistent carb diet  Moderate persistent asthma without complication  Continue albuterol, Symbicort, and umeclidinium        VTE Pharmacologic Prophylaxis:   Moderate Risk (Score 3-4) - Pharmacological DVT Prophylaxis Ordered: apixaban (Eliquis).  Code Status: Level 1 - Full Code   Discussion with family: Patient declined call to .     Anticipated Length of Stay: Patient will be admitted on an inpatient basis with an anticipated length of stay of greater than 2 midnights secondary to CHF exacerbation.    History of Present Illness   Chief Complaint: Lower extremity swelling and dyspnea    Mesfin Cano is a 59 y.o. male with a PMH of heart failure with preserved LVEF, CAD, DM 2, atrial fibrillation on Eliquis, hypertension among others who presents with increasing lower extremity edema.  He had multiple admissions to our facility in the past for decompensated heart failure.  He had noted increasing lower extremity swelling and abdominal distention in the days leading up to admission.  Patient is adherent to his medical and lifestyle regimens.  He states he is currently 16 pounds above his dry weight, his dry weight being 319 pounds.  His cardiologist attempted trial of IV Lasix yesterday with little improvement and so he was referred to the ED for further admission and evaluation.  At present  patient is resting comfortably and has no new needs.    Review of Systems   Constitutional:  Negative for chills and fever.   HENT:  Negative for sore throat.    Eyes:  Negative for visual disturbance.   Respiratory:  Positive for shortness of breath. Negative for cough.    Cardiovascular:  Positive for leg swelling. Negative for chest pain and palpitations.   Gastrointestinal:  Negative for abdominal pain and vomiting.   Genitourinary:  Negative for dysuria and hematuria.   Musculoskeletal:  Negative for arthralgias and back pain.   Skin:  Negative for color change and rash.   Neurological:  Negative for syncope.   All other systems reviewed and are negative.      Historical Information   Past Medical History:   Diagnosis Date    Acute gastritis without hemorrhage     last assessed 3/24/17    Asthma     Atrial fibrillation (HCC)     Bilateral leg edema 02/19/2020    CHF (congestive heart failure) ([facility])     Coronary artery disease     Daytime sleepiness 07/17/2019    Diabetes mellitus ([facility])     Dyspnea on exertion 10/11/2021    Edema of both feet 01/23/2020    Gastric bypass status for obesity     Hyperlipidemia     Hypertension     Hypokalemia 11/14/2021    Impaired fasting glucose     last assessed 5/10/17    Knee sprain, bilateral 06/14/2018    Leg cramps 06/16/2022    Obesity     Viral gastroenteritis     last assessed 11/4/16     Past Surgical History:   Procedure Laterality Date    CARDIAC CATHETERIZATION N/A 3/9/2022    Procedure: CARDIAC RHC W/ PRESSURE SENSOR;  Surgeon: Aminata Wilcox MD;  Location: [facility];  Service: Cardiology    CARDIAC CATHETERIZATION N/A 9/6/2022    Procedure: Cardiac catheterization;  Surgeon: Yolanda Del Rosario DO;  Location: [facility];  Service: Cardiology    CARDIAC CATHETERIZATION N/A 9/6/2022    Procedure: Cardiac Coronary Angiogram;  Surgeon: Yolanda Del Rosario DO;  [facility];  Service: Cardiology    CARDIAC CATHETERIZATION N/A  10/11/2023    Procedure: Cardiac RHC;  Surgeon: Yoel Darden MD;  Location: [facility];  Service: Cardiology    CARPAL TUNNEL RELEASE      bilateral    CHOLECYSTECTOMY LAPAROSCOPIC N/A 2/7/2024    Procedure: CHOLECYSTECTOMY LAPAROSCOPIC;  Surgeon: Nena Ferguson MD;  Location: [facility];  Service: General    GASTRIC BYPASS  2004    with han en y    HERNIA REPAIR      ventral inscisional    IR BIOPSY BONE MARROW  12/14/2023    LAPAROSCOPIC TRANSGASTRIC ACCESS FOR ERCP N/A 2/7/2024    Procedure: LAPAROSCOPIC TRANSGASTRIC ACCESS FOR ERCP;  Surgeon: Nena Ferguson MD;  Location: [facility];  Service: General    LAPAROSCOPIC TRANSGASTRIC ACCESS FOR ERCP N/A 2/7/2024    Procedure: ERCP, sphincterotomy, stone extraction;  Surgeon: Rey Ferguson MD;  Location: [facility];  Service: Gastroenterology    TONSILLECTOMY       Social History     Tobacco Use    Smoking status: Former     Current packs/day: 1.00     Average packs/day: 1 pack/day for 5.0 years (5.0 ttl pk-yrs)     Types: Pipe, Cigars, Cigarettes     Start date: 2016     Quit date: 1/1/2021     Passive exposure: Never    Smokeless tobacco: Former    Tobacco comments:     cigar once a day   Vaping Use    Vaping status: Never Used   Substance and Sexual Activity    Alcohol use: Yes     Alcohol/week: 2.0 standard drinks of alcohol     Types: 2 Shots of liquor per week     Comment: social    Drug use: No    Sexual activity: Yes     Partners: Female     Birth control/protection: None     E-Cigarette/Vaping    E-Cigarette Use Never User      E-Cigarette/Vaping Substances    Nicotine No     THC No     CBD No     Flavoring No     Other No     Unknown No      Family History   Problem Relation Age of Onset    Stroke Mother     Hypertension Father     Cancer Father     COPD Father      Social History:  Marital Status: /Civil Union   Occupation: Not on file  Patient Pre-hospital Living Situation: Home  Patient Pre-hospital Level of Mobility:  walks  Patient Pre-hospital Diet Restrictions: Diabetic    Objective :  Temp:  [97.8 °F (36.6 °C)] 97.8 °F (36.6 °C)  HR:  [84-89] 84  BP: (114-141)/(66-70) 114/66  Resp:  [18-22] 22  SpO2:  [96 %-97 %] 96 %  O2 Device: None (Room air)    Physical Exam  Vitals and nursing note reviewed.   Constitutional:       General: He is not in acute distress.     Appearance: He is well-developed. He is obese. He is not toxic-appearing or diaphoretic.   HENT:      Head: Normocephalic and atraumatic.      Mouth/Throat:      Mouth: Mucous membranes are moist.   Eyes:      General: No scleral icterus.     Extraocular Movements: Extraocular movements intact.      Conjunctiva/sclera: Conjunctivae normal.   Cardiovascular:      Rate and Rhythm: Normal rate and regular rhythm.      Pulses: Normal pulses.      Heart sounds: No murmur heard.     No friction rub. No gallop.   Pulmonary:      Effort: Pulmonary effort is normal. No respiratory distress.      Breath sounds: Normal breath sounds. No wheezing.   Abdominal:      General: Abdomen is flat. Bowel sounds are normal. There is distension.      Palpations: Abdomen is soft.      Tenderness: There is no abdominal tenderness. There is no guarding or rebound.   Musculoskeletal:         General: No swelling.      Cervical back: Neck supple.      Right lower leg: Edema present.      Left lower leg: Edema present.   Lymphadenopathy:      Cervical: No cervical adenopathy.   Skin:     General: Skin is warm and dry.      Capillary Refill: Capillary refill takes less than 2 seconds.      Coloration: Skin is not jaundiced.      Findings: No rash.   Neurological:      General: No focal deficit present.      Mental Status: He is alert and oriented to person, place, and time.      Sensory: No sensory deficit.      Motor: No weakness.   Psychiatric:         Mood and Affect: Mood normal.         Lines/Drains:            Lab Results: I have reviewed the following results:  Results from last 7 days    Lab Units 10/04/24  1400   WBC Thousand/uL 8.09   HEMOGLOBIN g/dL 11.7*   HEMATOCRIT % 37.5   PLATELETS Thousands/uL 280   SEGS PCT % 77*   LYMPHO PCT % 9*   MONO PCT % 7   EOS PCT % 5     Results from last 7 days   Lab Units 10/04/24  1400   SODIUM mmol/L 135   POTASSIUM mmol/L 4.1   CHLORIDE mmol/L 99   CO2 mmol/L 22   BUN mg/dL 28*   CREATININE mg/dL 1.40*   ANION GAP mmol/L 14*   CALCIUM mg/dL 8.1*   GLUCOSE RANDOM mg/dL 211*         Results from last 7 days   Lab Units 10/04/24  1623   POC GLUCOSE mg/dl 178*     Lab Results   Component Value Date    HGBA1C 8.3 (H) 06/19/2024    HGBA1C 9.3 (H) 12/28/2023    HGBA1C 7.1 (A) 08/09/2023           Imaging Results Review: I personally reviewed the following image studies in PACS and associated radiology reports: chest xray. My interpretation of the radiology images/reports is: No CHF.  Other Study Results Review: EKG was reviewed.     Administrative Statements       ** Please Note: This note has been constructed using a voice recognition system. **

## 2024-10-04 NOTE — ED ATTENDING ATTESTATION
10/4/2024  I, Kamaljit Gilbert DO, saw and evaluated the patient. I have discussed the patient with the resident/non-physician practitioner and agree with the resident's/non-physician practitioner's findings, Plan of Care, and MDM as documented in the resident's/non-physician practitioner's note, except where noted. All available labs and Radiology studies were reviewed.  I was present for key portions of any procedure(s) performed by the resident/non-physician practitioner and I was immediately available to provide assistance.       At this point I agree with the current assessment done in the Emergency Department.  I have conducted an independent evaluation of this patient a history and physical is as follows:      59-year-old male with history of HFpEF presents for evaluation of increased abdominal distention, bilateral leg edema, 16 pound weight gain in the last 2 weeks, decreased diuresis after taking his medications.  He was given IV diuretic yesterday and in the cardiology office after which he peed twice however he states that it was not much.  He has had multiple admissions for volume overload requiring extended diuresis.  Currently only complains of dyspnea with exertion.  Denies dyspnea at rest, chest pain no complaints this time.    Lungs are clear to auscultation bilaterally  No respiratory distress  SpO2 97% in room air    3+ pitting edema bilateral lower extremities up to the knees    Impression: Decompensated CHF, volume overload plan: Bumetanide 5 mg IV, acetazolamide 500 mg IV, cardiac workup, admit for continued diuresis over the next week        ED Course         Critical Care Time  Procedures

## 2024-10-05 PROBLEM — B35.1 ONYCHOMYCOSIS: Status: ACTIVE | Noted: 2024-10-05

## 2024-10-05 PROBLEM — E87.8 ELECTROLYTE ABNORMALITY: Status: ACTIVE | Noted: 2024-10-05

## 2024-10-05 LAB
ANION GAP SERPL CALCULATED.3IONS-SCNC: 13 MMOL/L (ref 4–13)
BUN SERPL-MCNC: 31 MG/DL (ref 5–25)
CALCIUM SERPL-MCNC: 8.2 MG/DL (ref 8.4–10.2)
CHLORIDE SERPL-SCNC: 93 MMOL/L (ref 96–108)
CO2 SERPL-SCNC: 29 MMOL/L (ref 21–32)
CREAT SERPL-MCNC: 1.81 MG/DL (ref 0.6–1.3)
ERYTHROCYTE [DISTWIDTH] IN BLOOD BY AUTOMATED COUNT: 15.9 % (ref 11.6–15.1)
GFR SERPL CREATININE-BSD FRML MDRD: 40 ML/MIN/1.73SQ M
GLUCOSE SERPL-MCNC: 151 MG/DL (ref 65–140)
GLUCOSE SERPL-MCNC: 166 MG/DL (ref 65–140)
GLUCOSE SERPL-MCNC: 170 MG/DL (ref 65–140)
GLUCOSE SERPL-MCNC: 182 MG/DL (ref 65–140)
GLUCOSE SERPL-MCNC: 183 MG/DL (ref 65–140)
HCT VFR BLD AUTO: 36.7 % (ref 36.5–49.3)
HGB BLD-MCNC: 11.2 G/DL (ref 12–17)
MAGNESIUM SERPL-MCNC: 2 MG/DL (ref 1.9–2.7)
MCH RBC QN AUTO: 26.7 PG (ref 26.8–34.3)
MCHC RBC AUTO-ENTMCNC: 30.5 G/DL (ref 31.4–37.4)
MCV RBC AUTO: 87 FL (ref 82–98)
PHOSPHATE SERPL-MCNC: 3.9 MG/DL (ref 2.7–4.5)
PLATELET # BLD AUTO: 268 THOUSANDS/UL (ref 149–390)
PMV BLD AUTO: 10.2 FL (ref 8.9–12.7)
POTASSIUM SERPL-SCNC: 3.3 MMOL/L (ref 3.5–5.3)
RBC # BLD AUTO: 4.2 MILLION/UL (ref 3.88–5.62)
SODIUM SERPL-SCNC: 135 MMOL/L (ref 135–147)
WBC # BLD AUTO: 8.01 THOUSAND/UL (ref 4.31–10.16)

## 2024-10-05 PROCEDURE — 99232 SBSQ HOSP IP/OBS MODERATE 35: CPT | Performed by: STUDENT IN AN ORGANIZED HEALTH CARE EDUCATION/TRAINING PROGRAM

## 2024-10-05 PROCEDURE — 85027 COMPLETE CBC AUTOMATED: CPT | Performed by: STUDENT IN AN ORGANIZED HEALTH CARE EDUCATION/TRAINING PROGRAM

## 2024-10-05 PROCEDURE — 80048 BASIC METABOLIC PNL TOTAL CA: CPT | Performed by: PHYSICIAN ASSISTANT

## 2024-10-05 PROCEDURE — 83735 ASSAY OF MAGNESIUM: CPT | Performed by: PHYSICIAN ASSISTANT

## 2024-10-05 PROCEDURE — 99222 1ST HOSP IP/OBS MODERATE 55: CPT | Performed by: PHYSICIAN ASSISTANT

## 2024-10-05 PROCEDURE — NC001 PR NO CHARGE: Performed by: PODIATRIST

## 2024-10-05 PROCEDURE — 84100 ASSAY OF PHOSPHORUS: CPT | Performed by: STUDENT IN AN ORGANIZED HEALTH CARE EDUCATION/TRAINING PROGRAM

## 2024-10-05 PROCEDURE — 82948 REAGENT STRIP/BLOOD GLUCOSE: CPT

## 2024-10-05 RX ORDER — POTASSIUM CHLORIDE 1500 MG/1
60 TABLET, EXTENDED RELEASE ORAL ONCE
Status: COMPLETED | OUTPATIENT
Start: 2024-10-05 | End: 2024-10-05

## 2024-10-05 RX ADMIN — ATORVASTATIN CALCIUM 10 MG: 10 TABLET, FILM COATED ORAL at 09:33

## 2024-10-05 RX ADMIN — BUDESONIDE AND FORMOTEROL FUMARATE DIHYDRATE 2 PUFF: 160; 4.5 AEROSOL RESPIRATORY (INHALATION) at 09:34

## 2024-10-05 RX ADMIN — METOPROLOL SUCCINATE 50 MG: 50 TABLET, EXTENDED RELEASE ORAL at 09:33

## 2024-10-05 RX ADMIN — POTASSIUM CHLORIDE 60 MEQ: 1500 TABLET, EXTENDED RELEASE ORAL at 12:48

## 2024-10-05 RX ADMIN — APIXABAN 5 MG: 5 TABLET, FILM COATED ORAL at 09:33

## 2024-10-05 RX ADMIN — PREGABALIN 50 MG: 50 CAPSULE ORAL at 21:32

## 2024-10-05 RX ADMIN — INSULIN LISPRO 2 UNITS: 100 INJECTION, SOLUTION INTRAVENOUS; SUBCUTANEOUS at 09:34

## 2024-10-05 RX ADMIN — POTASSIUM CHLORIDE 40 MEQ: 1500 TABLET, EXTENDED RELEASE ORAL at 09:33

## 2024-10-05 RX ADMIN — UMECLIDINIUM 1 PUFF: 62.5 AEROSOL, POWDER ORAL at 09:36

## 2024-10-05 RX ADMIN — INSULIN LISPRO 2 UNITS: 100 INJECTION, SOLUTION INTRAVENOUS; SUBCUTANEOUS at 12:49

## 2024-10-05 RX ADMIN — APIXABAN 5 MG: 5 TABLET, FILM COATED ORAL at 17:21

## 2024-10-05 RX ADMIN — POTASSIUM CHLORIDE 40 MEQ: 1500 TABLET, EXTENDED RELEASE ORAL at 17:21

## 2024-10-05 RX ADMIN — INSULIN LISPRO 2 UNITS: 100 INJECTION, SOLUTION INTRAVENOUS; SUBCUTANEOUS at 17:22

## 2024-10-05 RX ADMIN — Medication 1.5 MG/HR: at 22:36

## 2024-10-05 RX ADMIN — BUDESONIDE AND FORMOTEROL FUMARATE DIHYDRATE 2 PUFF: 160; 4.5 AEROSOL RESPIRATORY (INHALATION) at 17:20

## 2024-10-05 RX ADMIN — Medication 1.5 MG/HR: at 13:47

## 2024-10-05 RX ADMIN — Medication 1.5 MG/HR: at 05:50

## 2024-10-05 RX ADMIN — INSULIN LISPRO 2 UNITS: 100 INJECTION, SOLUTION INTRAVENOUS; SUBCUTANEOUS at 21:32

## 2024-10-05 RX ADMIN — FAMOTIDINE 20 MG: 20 TABLET, FILM COATED ORAL at 09:33

## 2024-10-05 NOTE — PROGRESS NOTES
Progress Note - Hospitalist   Name: Mesfin Cano 59 y.o. male I MRN: [id number]  [facility] I Date of Admission: 10/4/2024   Date of Service: 10/5/2024 I Hospital Day: 1    Assessment & Plan  Acute on chronic diastolic congestive heart failure (HCC)  Wt Readings from Last 3 Encounters:   10/05/24 (!) 148 kg (327 lb 3.2 oz)   10/03/24 (!) 150 kg (331 lb 11.2 oz)   10/01/24 (!) 147 kg (324 lb)   This is a 59-year-old gentleman known to the cardiology and heart failure services for frequent exacerbations of his heart failure with preserved LVEF.  Seen in cardiology office earlier in the week and received IV Lasix 120 mg x 1 with poor urine output and weight gain above baseline approximately 16 pounds.  Dry weight 319.  Adherent to medications and lifestyle needs.  Home regimen: Bumex 6 mg 3 times daily with as needed metolazone, spironolactone  Last echocardiogram in our system appears to demonstrate EF 55% in 6/20/24 (diastolic function could not be assessed due to afib)  Continue Bumex drip at 1.5  Heart failure team consulted, appreciate recommendations  Monitor electrolytes and telemetry  Good urine output overnight although reports symptomatically unchanged as he continues to have TELLES, distention and lower extremity edema  Primary hypertension  Continue Toprol XL and monitor BP closely while on diuretic gtt  Hyperlipidemia  Continue atorvastatin 10 mg daily  Morbid obesity (HCC)  Patient would strongly benefit from weight loss therapy  VICKY (obstructive sleep apnea)  Not currently qualified for CPAP therapy, recommend outpt followup  Paroxysmal atrial fibrillation (HCC)  Rate controlled on Toprol  Anticoagulated on eliquis  Monitor on telemetry  Stage 3a chronic kidney disease (HCC)  Lab Results   Component Value Date    EGFR 40 10/05/2024    EGFR 48 10/04/2024    EGFR 55 10/04/2024    CREATININE 1.81 (H) 10/05/2024    CREATININE 1.55 (H) 10/04/2024    CREATININE 1.40 (H) 10/04/2024     Renal function  appears baseline, monitor renal function and electrolytes closely while on diuretic infusion  Diabetic polyneuropathy associated with type 2 diabetes mellitus (HCC)  Lab Results   Component Value Date    HGBA1C 8.3 (H) 06/19/2024       Recent Labs     10/04/24  1623 10/04/24  2119 10/05/24  0801 10/05/24  1136   POCGLU 178* 200* 166* 182*       Blood Sugar Average: Last 72 hrs:  (P) 181.5    Hold home antihyperglycemics and proceed with ISS  Consistent carb diet  Moderate persistent asthma without complication  Continue albuterol, Symbicort, and umeclidinium    Onychomycosis  Podiatry consulted for nail clipping as patient has extensively elongated LE nails  Electrolyte abnormality  K 3.3, repleted  Mag 2.0  Monitor     VTE Pharmacologic Prophylaxis:   Moderate Risk (Score 3-4) - Pharmacological DVT Prophylaxis Ordered: apixaban (Eliquis).    Mobility:   Basic Mobility Inpatient Raw Score: 22  JH-HLM Goal: 7: Walk 25 feet or more  JH-HLM Achieved: 6: Walk 10 steps or more  JH-HLM Goal NOT achieved. Continue with multidisciplinary rounding and encourage appropriate mobility to improve upon JH-HLM goals.    Patient Centered Rounds: I performed bedside rounds with nursing staff today.   Discussions with Specialists or Other Care Team Provider: Heart failure, podiatry    Education and Discussions with Family / Patient:  Patient.     Current Length of Stay: 1 day(s)  Current Patient Status: Inpatient   Certification Statement: The patient will continue to require additional inpatient hospital stay due to IV diuresis  Discharge Plan: Anticipate discharge in 48-72 hrs to home.     Code Status: Level 1 - Full Code    Subjective   Patient assessed at bedside.  Ongoing TELLES, abdominal distention and lower extremity edema.  Significant urine output overnight.  Inaccurate intake however output of 3.2 L.    Objective :  Temp:  [97.8 °F (36.6 °C)-98.4 °F (36.9 °C)] 98 °F (36.7 °C)  HR:  [] 76  BP: (114-144)/(65-87)  144/79  Resp:  [16-22] 20  SpO2:  [95 %-99 %] 99 %  O2 Device: None (Room air)    Body mass index is 43.17 kg/m².     Input and Output Summary (last 24 hours):     Intake/Output Summary (Last 24 hours) at 10/5/2024 1157  Last data filed at 10/5/2024 0941  Gross per 24 hour   Intake 190 ml   Output 3325 ml   Net -3135 ml       Physical Exam  Vitals reviewed.   Constitutional:       General: He is not in acute distress.     Appearance: Normal appearance. He is not ill-appearing.   HENT:      Head: Normocephalic and atraumatic.   Pulmonary:      Effort: Pulmonary effort is normal. No respiratory distress.      Breath sounds: Normal breath sounds. No wheezing or rales.   Abdominal:      General: Bowel sounds are normal. There is distension.      Palpations: Abdomen is soft.      Tenderness: There is no abdominal tenderness.   Musculoskeletal:      Right lower leg: Edema present.      Left lower leg: Edema present.   Neurological:      Mental Status: He is alert and oriented to person, place, and time.           Lines/Drains:        Telemetry:  Telemetry Orders (From admission, onward)               24 Hour Telemetry Monitoring  Continuous x 24 Hours (Telem)        Question:  Reason for 24 Hour Telemetry  Answer:  Decompensated CHF- and any one of the following: continuous diuretic infusion or total diuretic dose >200 mg daily, associated electrolyte derangement (I.e. K < 3.0), ionotropic drip (continuous infusion), hx of ventricular arrhythmia, or new EF < 35%                     Telemetry Reviewed: Normal Sinus Rhythm with PVCs  Indication for Continued Telemetry Use: Acute CHF on >200 mg lasix/day or equivalent dose or with new reduced EF.                Lab Results: I have reviewed the following results:   Results from last 7 days   Lab Units 10/05/24  0444 10/04/24  1400   WBC Thousand/uL 8.01 8.09   HEMOGLOBIN g/dL 11.2* 11.7*   HEMATOCRIT % 36.7 37.5   PLATELETS Thousands/uL 268 280   SEGS PCT %  --  77*   LYMPHO  PCT %  --  9*   MONO PCT %  --  7   EOS PCT %  --  5     Results from last 7 days   Lab Units 10/05/24  0444   SODIUM mmol/L 135   POTASSIUM mmol/L 3.3*   CHLORIDE mmol/L 93*   CO2 mmol/L 29   BUN mg/dL 31*   CREATININE mg/dL 1.81*   ANION GAP mmol/L 13   CALCIUM mg/dL 8.2*   GLUCOSE RANDOM mg/dL 151*         Results from last 7 days   Lab Units 10/05/24  1136 10/05/24  0801 10/04/24  2119 10/04/24  1623   POC GLUCOSE mg/dl 182* 166* 200* 178*               Recent Cultures (last 7 days):         Imaging Results Review: I reviewed radiology reports from this admission including: chest xray.  Other Study Results Review: EKG was reviewed.     Last 24 Hours Medication List:     Current Facility-Administered Medications:     acetaminophen (TYLENOL) tablet 650 mg, Q6H PRN    albuterol (PROVENTIL HFA,VENTOLIN HFA) inhaler 2 puff, Q6H PRN    apixaban (ELIQUIS) tablet 5 mg, BID    atorvastatin (LIPITOR) tablet 10 mg, Daily    budesonide-formoterol (SYMBICORT) 160-4.5 mcg/act inhaler 2 puff, BID    bumetanide (BUMEX) 12.5 mg infusion 50 mL, Continuous, Last Rate: 1.5 mg/hr (10/05/24 0701)    famotidine (PEPCID) tablet 20 mg, Daily    insulin lispro (HumALOG/ADMELOG) 100 units/mL subcutaneous injection 2-12 Units, TID AC **AND** Fingerstick Glucose (POCT), TID AC    insulin lispro (HumALOG/ADMELOG) 100 units/mL subcutaneous injection 2-12 Units, HS    insulin lispro (HumALOG/ADMELOG) 100 units/mL subcutaneous injection 2-12 Units, TID AC **AND** Fingerstick Glucose (POCT), TID AC    metoprolol succinate (TOPROL-XL) 24 hr tablet 50 mg, Daily    nitroglycerin (NITROSTAT) SL tablet 0.4 mg, Q5 Min PRN    potassium chloride (Klor-Con M20) CR tablet 40 mEq, BID    potassium chloride (Klor-Con M20) CR tablet 60 mEq, Once    pregabalin (LYRICA) capsule 50 mg, HS    umeclidinium 62.5 mcg/actuation inhaler AEPB 1 puff, Daily    Administrative Statements   Today, Patient Was Seen By: Gabby Espitia, DO  I have spent a total time of  30 minutes in caring for this patient on the day of the visit/encounter including Counseling / Coordination of care, Documenting in the medical record, Reviewing / ordering tests, medicine, procedures  , Obtaining or reviewing history  , and Communicating with other healthcare professionals .    **Please Note: This note may have been constructed using a voice recognition system.**

## 2024-10-05 NOTE — ASSESSMENT & PLAN NOTE
Lab Results   Component Value Date    HGBA1C 8.3 (H) 06/19/2024       Recent Labs     10/04/24  1623 10/04/24  2119 10/05/24  0801 10/05/24  1136   POCGLU 178* 200* 166* 182*       Blood Sugar Average: Last 72 hrs:  (P) 181.5    Hold home antihyperglycemics and proceed with ISS  Consistent carb diet

## 2024-10-05 NOTE — ASSESSMENT & PLAN NOTE
Wt Readings from Last 3 Encounters:   10/05/24 (!) 148 kg (327 lb 3.2 oz)   10/03/24 (!) 150 kg (331 lb 11.2 oz)   10/01/24 (!) 147 kg (324 lb)   This is a 59-year-old gentleman known to the cardiology and heart failure services for frequent exacerbations of his heart failure with preserved LVEF.  Seen in cardiology office earlier in the week and received IV Lasix 120 mg x 1 with poor urine output and weight gain above baseline approximately 16 pounds.  Dry weight 319.  Adherent to medications and lifestyle needs.  Home regimen: Bumex 6 mg 3 times daily with as needed metolazone, spironolactone  Last echocardiogram in our system appears to demonstrate EF 55% in 6/20/24 (diastolic function could not be assessed due to afib)  Continue Bumex drip at 1.5  Heart failure team consulted, appreciate recommendations  Monitor electrolytes and telemetry  Good urine output overnight although reports symptomatically unchanged as he continues to have TELLES, distention and lower extremity edema

## 2024-10-05 NOTE — CONSULTS
"                Advanced Heart Failure / Pulmonary Hypertension Service Consultation    Mesfin Cano 59 y.o. male  MRN: [id number]  Unit/Bed#: [facility] 411-01; Encounter: [id number]    Assessment:  Principal Problem:    Acute on chronic diastolic congestive heart failure (HCC)  Active Problems:    Primary hypertension    Hyperlipidemia    Morbid obesity (HCC)    VICKY (obstructive sleep apnea)    Paroxysmal atrial fibrillation (HCC)    Stage 3a chronic kidney disease (HCC)    Diabetic polyneuropathy associated with type 2 diabetes mellitus (HCC)    Moderate persistent asthma without complication      HPI:   Mesfin Cano is a 59-year-old man with PMH as below who presented to [facility] on 10/04/2024 with acute on chronic HF. Seen in cardiology office earlier this week and received 120 mg IV Lasix. Patient with weight gain the following day and lackluster urine output; advised to proceed to ED for admission.     Patient seen and examined. Reports increased urine output since being started on Bumex drip. However, denies any significant improvement in symptoms yet; continues with abdominal distention and TELLES.  Denies lightheadedness, dizziness, chest pain, and SOB at rest.    Heart failure service was consulted for \"Acute exacerbation of CHF.\" Patient follows with Dr. Wilcox for outpatient cardiology.     Objective:   Intake/ Output: Intake accurate? Out 3.2 L.   Weight: 327 lbs (328 lbs on 10/04).   Telemetry: sinus with PVCs, 70s.   MAPs: .    Today's Plan:  Continue Bumex drip at 1.5 mg/hr.   Potassium 3.3 this AM. Will give additional 60 mEq PO potassium.   Continue on telemetry. If frequent PVCs s/p electrolyte correction, will consider up titration of beta-blocker.  Outpatient Jardiance and spironolactone on hold.    Plan:  Acute on chronic HFpEF; LVEF 55%; LVIDd 6.0 cm              Etiology: Afib, HTN, obesity phenotype.              LHC 09/06/2022: no obstructive CAD.              TTE 04/11/2023: LVEF 50%. " LVIDd 6.6 cm. Grade 2 DD. Normal RV. Trace MR and TR. Normal IVC.               RHC / MEMS calibration 10/11/2023: CardioMEMS data correlates to RHC.    TTE 06/20/2024: LVEF 55%. Normal RV. BRIAN (L>R). Normal IVC.     Guideline Directed Medical Therapy:  --ARNi / ACEi / ARB: none.   --Aldosterone Antagonist:  Held.  --SGLT2 Inhibitor: . Held.    Volume Management:  --Home Diuretic: Bumex 6 mg TID with PRN metolazone 5 mg.   --Inpatient Diuretic: IV Bumex 1.5 mg/hr.   --K supplementation: potassium 40 mEq BID.                  Advanced Therapies:              --CardioMEMS: implanted 03/09/2022. Goal PAd of 15-18.     Atrial fibrillation, parosxysmal              [id number] = 3 (HF, HTN, DM).              Anticoagulation on Eliquis.               Rate control: metoprolol succinate 50 mg daily.              Rhythm control: No.     Chronic kidney disease, stage IIIb              Baseline creatinine of 1.5-2.0.              Today, creatinine of 1.81 (1.55 on 10/04).      Hypertension  Hyperlipidemia  Asthma, severe persistent: follows with Dr. Noonan as outpatient.   Obstructive sleep apnea: without CPAP and equipment for >12 months. In process of getting new equipment.   Diabetes mellitus, type II  History of gastric bypass (Samuel-en-Y) surgery   History of tobacco abuse     Past Medical History:   Diagnosis Date    Acute gastritis without hemorrhage     last assessed 3/24/17    Asthma     Atrial fibrillation (HCC)     Bilateral leg edema 02/19/2020    CHF (congestive heart failure) (HCC)     Coronary artery disease     Daytime sleepiness 07/17/2019    Diabetes mellitus (HCC)     Dyspnea on exertion 10/11/2021    Edema of both feet 01/23/2020    Gastric bypass status for obesity     Hyperlipidemia     Hypertension     Hypokalemia 11/14/2021    Impaired fasting glucose     last assessed 5/10/17    Knee sprain, bilateral 06/14/2018    Leg cramps 06/16/2022    Obesity     Viral gastroenteritis     last assessed 11/4/16        Review of Systems   Constitutional:  Positive for unexpected weight change. Negative for activity change, chills, fatigue and fever.   Respiratory:  Positive for shortness of breath. Negative for cough and chest tightness.    Cardiovascular:  Negative for chest pain.   Gastrointestinal:  Positive for abdominal distention. Negative for abdominal pain.   Genitourinary:  Negative for decreased urine volume, dysuria and urgency.   Musculoskeletal:  Positive for back pain.   Neurological:  Negative for dizziness, syncope, weakness and light-headedness.   Psychiatric/Behavioral:  Negative for confusion. The patient is not nervous/anxious.    All other systems reviewed and are negative.        Current Facility-Administered Medications:     acetaminophen (TYLENOL) tablet 650 mg, 650 mg, Oral, Q6H PRN, Christiano Leavitt    albuterol (PROVENTIL HFA,VENTOLIN HFA) inhaler 2 puff, 2 puff, Inhalation, Q6H PRN, Christiano Leavitt    apixaban (ELIQUIS) tablet 5 mg, 5 mg, Oral, BID, Christiano Leavitt, 5 mg at 10/05/24 0933    atorvastatin (LIPITOR) tablet 10 mg, 10 mg, Oral, Daily, Christiano Leavitt, 10 mg at 10/05/24 0933    budesonide-formoterol (SYMBICORT) 160-4.5 mcg/act inhaler 2 puff, 2 puff, Inhalation, BID, Christiano Leavitt, 2 puff at 10/05/24 0934    bumetanide (BUMEX) 12.5 mg infusion 50 mL, 1.5 mg/hr, Intravenous, Continuous, Christiano Leavitt, Last Rate: 6 mL/hr at 10/05/24 0701, 1.5 mg/hr at 10/05/24 0701    famotidine (PEPCID) tablet 20 mg, 20 mg, Oral, Daily, Christiano Leavitt, 20 mg at 10/05/24 0933    insulin lispro (HumALOG/ADMELOG) 100 units/mL subcutaneous injection 2-12 Units, 2-12 Units, Subcutaneous, TID AC, 2 Units at 10/05/24 0934 **AND** Fingerstick Glucose (POCT), , , TID AC, Christiano Leavitt    insulin lispro (HumALOG/ADMELOG) 100 units/mL subcutaneous injection 2-12 Units, 2-12 Units, Subcutaneous, HS, Christiano Leavitt, 4 Units at 10/04/24 9413    insulin lispro (HumALOG/ADMELOG) 100 units/mL  subcutaneous injection 2-12 Units, 2-12 Units, Subcutaneous, TID AC **AND** Fingerstick Glucose (POCT), , , TID AC, Christiano Leavitt    metoprolol succinate (TOPROL-XL) 24 hr tablet 50 mg, 50 mg, Oral, Daily, Christiano Leavitt, 50 mg at 10/05/24 0933    nitroglycerin (NITROSTAT) SL tablet 0.4 mg, 0.4 mg, Sublingual, Q5 Min PRN, Christiano Leaivtt    potassium chloride (Klor-Con M20) CR tablet 40 mEq, 40 mEq, Oral, BID, Christiano Leavitt, 40 mEq at 10/05/24 0933    potassium chloride (Klor-Con M20) CR tablet 60 mEq, 60 mEq, Oral, Once, Aster García PA-C    pregabalin (LYRICA) capsule 50 mg, 50 mg, Oral, HS, Wm Muñoz PA-C, 50 mg at 10/04/24 2253    umeclidinium 62.5 mcg/actuation inhaler AEPB 1 puff, 1 puff, Inhalation, Daily, Christiano Leavitt, 1 puff at 10/05/24 0936    Allergies   Allergen Reactions    Azithromycin Other (See Comments)     Shaky, uneasy feeling     Bactrim [Sulfamethoxazole-Trimethoprim] Other (See Comments)     shakiness     Social History     Socioeconomic History    Marital status: /Civil Union     Spouse name: Not on file    Number of children: Not on file    Years of education: Not on file    Highest education level: Not on file   Occupational History    Not on file   Tobacco Use    Smoking status: Former     Current packs/day: 1.00     Average packs/day: 1 pack/day for 5.0 years (5.0 ttl pk-yrs)     Types: Pipe, Cigars, Cigarettes     Start date: 2016     Quit date: 1/1/2021     Passive exposure: Never    Smokeless tobacco: Former    Tobacco comments:     cigar once a day   Vaping Use    Vaping status: Never Used   Substance and Sexual Activity    Alcohol use: Yes     Alcohol/week: 2.0 standard drinks of alcohol     Types: 2 Shots of liquor per week     Comment: social    Drug use: No    Sexual activity: Yes     Partners: Female     Birth control/protection: None   Other Topics Concern    Not on file   Social History Narrative    Not on file     Social  "Determinants of Health     Financial Resource Strain: Not on file   Food Insecurity: Food Insecurity Present (8/1/2024)    Hunger Vital Sign     Worried About Running Out of Food in the Last Year: Sometimes true     Ran Out of Food in the Last Year: Never true   Transportation Needs: No Transportation Needs (8/1/2024)    PRAPARE - Transportation     Lack of Transportation (Medical): No     Lack of Transportation (Non-Medical): No   Physical Activity: Not on file   Stress: Not on file   Social Connections: Not on file   Intimate Partner Violence: Not on file   Housing Stability: Low Risk  (8/1/2024)    Housing Stability Vital Sign     Unable to Pay for Housing in the Last Year: No     Number of Times Moved in the Last Year: 0     Homeless in the Last Year: No     Family History   Problem Relation Age of Onset    Stroke Mother     Hypertension Father     Cancer Father     COPD Father        Vitals:  Blood pressure 144/79, pulse 76, temperature 98 °F (36.7 °C), temperature source Oral, resp. rate 20, height 6' 1\" (1.854 m), weight (!) 148 kg (327 lb 3.2 oz), SpO2 99%.    I/O last 3 completed shifts:  In: 0   Out: 3150 [Urine:3150]    Weight (last 2 days)       Date/Time Weight    10/05/24 0444 148 (327.2)    10/04/24 2040 149 (328)          Wt Readings from Last 10 Encounters:   10/05/24 (!) 148 kg (327 lb 3.2 oz)   10/03/24 (!) 150 kg (331 lb 11.2 oz)   10/01/24 (!) 147 kg (324 lb)   08/20/24 (!) 144 kg (317 lb)   08/08/24 (!) 145 kg (319 lb)   08/08/24 (!) 149 kg (328 lb 3.2 oz)   08/04/24 (!) 144 kg (318 lb 6.4 oz)   07/19/24 (!) 150 kg (330 lb)   06/26/24 (!) 149 kg (328 lb 3.2 oz)   05/24/24 (!) 150 kg (331 lb 9.6 oz)       Vitals:    10/04/24 2257 10/05/24 0233 10/05/24 0444 10/05/24 0700   BP: 127/70 125/68  144/79   BP Location: Left arm Left arm  Left arm   Pulse: 84 105  76   Resp: 18 18  20   Temp: 98.4 °F (36.9 °C) 98.4 °F (36.9 °C)  98 °F (36.7 °C)   TempSrc: Oral Oral  Oral   SpO2: 98% 99%     Weight:   " (!) 148 kg (327 lb 3.2 oz)    Height:           Physical Exam  Vitals reviewed.   Constitutional:       General: He is awake. He is not in acute distress.     Appearance: Normal appearance. He is well-developed and overweight. He is not toxic-appearing or diaphoretic.   HENT:      Head: Normocephalic.      Nose: Nose normal.      Mouth/Throat:      Mouth: Mucous membranes are moist.   Eyes:      General: No scleral icterus.     Conjunctiva/sclera: Conjunctivae normal.   Neck:      Vascular: JVD present.   Cardiovascular:      Rate and Rhythm: Normal rate and regular rhythm.      Heart sounds: No murmur heard.  Pulmonary:      Effort: Pulmonary effort is normal. No tachypnea, bradypnea or respiratory distress.      Breath sounds: Normal air entry. No decreased air movement. No rhonchi.   Abdominal:      General: There is distension.      Palpations: Abdomen is soft.      Tenderness: There is no abdominal tenderness.   Musculoskeletal:      Cervical back: Neck supple.      Right lower leg: Edema (trace) present.      Left lower leg: Edema (trace) present.   Skin:     General: Skin is warm and dry.      Coloration: Skin is not jaundiced or pale.   Neurological:      General: No focal deficit present.      Mental Status: He is alert and oriented to person, place, and time.   Psychiatric:         Attention and Perception: Attention normal.         Mood and Affect: Mood and affect normal.         Speech: Speech normal.         Behavior: Behavior normal. Behavior is cooperative.         Thought Content: Thought content normal.       Labs & Results:      Results from last 7 days   Lab Units 10/05/24  0444 10/04/24  1400   WBC Thousand/uL 8.01 8.09   HEMOGLOBIN g/dL 11.2* 11.7*   HEMATOCRIT % 36.7 37.5   PLATELETS Thousands/uL 268 280         Results from last 7 days   Lab Units 10/05/24  0444 10/04/24  2057 10/04/24  1400   POTASSIUM mmol/L 3.3* 4.1 4.1   CHLORIDE mmol/L 93* 95* 99   CO2 mmol/L 29 25 22   BUN mg/dL 31* 30*  28*   CREATININE mg/dL 1.81* 1.55* 1.40*   CALCIUM mg/dL 8.2* 8.0* 8.1*         Aster García PA-C

## 2024-10-05 NOTE — UTILIZATION REVIEW
Initial Clinical Review    Admission: Date/Time/Statement:   Admission Orders (From admission, onward)       Ordered        10/04/24 1518  INPATIENT ADMISSION  Once                          Orders Placed This Encounter   Procedures    INPATIENT ADMISSION     Standing Status:   Standing     Number of Occurrences:   1     Order Specific Question:   Level of Care     Answer:   Med Surg [16]     Order Specific Question:   Estimated length of stay     Answer:   More than 2 Midnights     Order Specific Question:   Certification     Answer:   I certify that inpatient services are medically necessary for this patient for a duration of greater than two midnights. See H&P and MD Progress Notes for additional information about the patient's course of treatment.     ED Arrival Information       Expected   -    Arrival   10/4/2024 13:23    Acuity   Urgent              Means of arrival   Walk-In    Escorted by   Self    Service   Hospitalist    Admission type   Emergency              Arrival complaint   Shortness of Breath             Chief Complaint   Patient presents with    Shortness of Breath     Came from home. C/o of worsening sob and chest pain for the past week. Compliant with meds. 16lb weight gain in 2 weeks. Hx of CHF       Initial Presentation: 59 y.o. male who presented self from home to [facility] ED. Admitted as Inpatient for evaluation and treatment of CHF exacerbation. PMHx: asthma, afib (on Eliquis), CHF, CAD, T2DM, gastric bypass, HLD, HTN. Presented w/ increased b/l LE edema and abdominal distention. Notes increased TELLES. 16 lb above dry weight. Had a trial of 120 mg IV lasix in cardiology office with no improvement; was referred to ED. On exam, abdominal distension, b/l LE edema. Labs Crt 1.40. Plan: start bumex gtt @ 1.5 mg/hr, Trend labs, replete electrolytes as needed; telemetry, continue Toprol XL, statin, Eliquis, SSI w/ BG checks ACHS, continue home asthma meds. Heart  Failure and Podiatry consulted.    Anticipated Length of Stay/Certification Statement: Patient will be admitted on an inpatient basis with an anticipated length of stay of greater than 2 midnights secondary to CHF exacerbation.       Date: 10/05/24   Day 2: Pt w/ ongoing TELLES, abdominal distention and b/l LE edema. Significant urine output overnight, unclear intake but 3.2L output. Exam: abdominal distention, b/l LE edema. Telemetry NSR w/ PVCs. Plan: continue bumex gtt, telemetry, Trend labs, replete electrolytes as needed. Continue current meds, SSI w/ BG checks ACHS, supportive care.     Podiatry: Pt w/ onychomycosis. Toenails excisionally debrided.     Heart Failure: Pt reports increased UO, but denies significant improvement in symptoms. Exam: JVD, abdominal distention, b/l LE edema. K 3.3. Plan: Continue bumex gtt. Trend labs, replete electrolytes as needed, telemetry. Hold Jardiance and spironolactone, continue other current meds.     ED Treatment-Medication Administration from 10/04/2024 1323 to 10/04/2024 2030         Date/Time Order Dose Route Action     10/04/2024 1445 bumetanide (BUMEX) injection 5 mg 5 mg Intravenous Given     10/04/2024 1558 acetaZOLAMIDE (DIAMOX) injection 500 mg 500 mg Intravenous Given     10/04/2024 1633 insulin lispro (HumALOG/ADMELOG) 100 units/mL subcutaneous injection 2-12 Units 2 Units Subcutaneous Given     10/04/2024 1648 bumetanide (BUMEX) 12.5 mg infusion 50 mL 1.5 mg/hr Intravenous New Bag     10/04/2024 1558 sterile water injection **ADS Override Pull** 5 mL  Given     10/04/2024 1856 apixaban (ELIQUIS) tablet 5 mg 5 mg Oral Given     10/04/2024 1856 potassium chloride (Klor-Con M20) CR tablet 40 mEq 40 mEq Oral Given            Scheduled Medications:  apixaban, 5 mg, Oral, BID  atorvastatin, 10 mg, Oral, Daily  budesonide-formoterol, 2 puff, Inhalation, BID  famotidine, 20 mg, Oral, Daily  insulin lispro, 2-12 Units, Subcutaneous, TID AC  insulin lispro, 2-12 Units,  Subcutaneous, HS  insulin lispro, 2-12 Units, Subcutaneous, TID AC  metoprolol succinate, 50 mg, Oral, Daily  potassium chloride, 40 mEq, Oral, BID  pregabalin, 50 mg, Oral, HS  umeclidinium, 1 puff, Inhalation, Daily    Continuous IV Infusions:  bumetanide (BUMEX) 12.5 mg infusion 50 mL, 1.5 mg/hr, Intravenous, Continuous    PRN Meds:  acetaminophen, 650 mg, Oral, Q6H PRN  albuterol, 2 puff, Inhalation, Q6H PRN  nitroglycerin, 0.4 mg, Sublingual, Q5 Min PRN    potassium chloride (Klor-Con M20) CR tablet 60 mEq  Dose: 60 mEq  Freq: Once Route: PO  Start: 10/05/24 1200 End: 10/05/24 1248      ED Triage Vitals   Temperature Pulse Respirations Blood Pressure SpO2 Pain Score   10/04/24 1333 10/04/24 1330 10/04/24 1330 10/04/24 1330 10/04/24 1330 10/04/24 2100   97.8 °F (36.6 °C) 89 18 141/70 97 % No Pain     Weight (last 2 days)       Date/Time Weight    10/05/24 0444 148 (327.2)    10/04/24 2040 149 (328)            Vital Signs (last 3 days)       Date/Time Temp Pulse Resp BP MAP (mmHg) SpO2 O2 Device Patient Position - Orthostatic VS Pain    10/05/24 1100 97.8 °F (36.6 °C) 70 18 111/71 87 -- None (Room air) Lying --    10/05/24 0940 -- -- -- -- -- -- -- -- No Pain    10/05/24 0700 98 °F (36.7 °C) 76 20 144/79 106 -- None (Room air) Sitting --    10/05/24 0233 98.4 °F (36.9 °C) 105 18 125/68 83 99 % None (Room air) Sitting --    10/04/24 2257 98.4 °F (36.9 °C) 84 18 127/70 89 98 % None (Room air) Lying --    10/04/24 2100 -- -- -- -- -- -- -- -- No Pain    10/04/24 2040 98.4 °F (36.9 °C) 95 18 134/75 98 97 % None (Room air) Sitting --    10/04/24 1930 -- 86 16 121/65 87 95 % None (Room air) Lying --    10/04/24 1900 -- 88 20 132/87 104 96 % None (Room air) Sitting --    10/04/24 1635 -- 84 22 114/66 85 96 % None (Room air) -- --    10/04/24 1559 -- 84 20 118/68 -- 97 % None (Room air) -- --    10/04/24 1359 -- -- -- -- -- -- None (Room air) -- --    10/04/24 1337 -- 84 20 141/70 99 97 % None (Room air) Lying --     10/04/24 1333 97.8 °F (36.6 °C) -- -- -- -- -- -- -- --    10/04/24 1330 -- 89 18 141/70 -- 97 % None (Room air) -- --              Pertinent Labs/Diagnostic Test Results:   Radiology:  XR chest portable   Final Interpretation by Carrie Marie MD (10/04 1531)      No acute cardiopulmonary disease.            Workstation performed: [id number]               Results from last 7 days   Lab Units 10/05/24  0444 10/04/24  1400   WBC Thousand/uL 8.01 8.09   HEMOGLOBIN g/dL 11.2* 11.7*   HEMATOCRIT % 36.7 37.5   PLATELETS Thousands/uL 268 280   TOTAL NEUT ABS Thousands/µL  --  6.35         Results from last 7 days   Lab Units 10/05/24  0444 10/04/24  2057 10/04/24  1400   SODIUM mmol/L 135 134* 135   POTASSIUM mmol/L 3.3* 4.1 4.1   CHLORIDE mmol/L 93* 95* 99   CO2 mmol/L 29 25 22   ANION GAP mmol/L 13 14* 14*   BUN mg/dL 31* 30* 28*   CREATININE mg/dL 1.81* 1.55* 1.40*   EGFR ml/min/1.73sq m 40 48 55   CALCIUM mg/dL 8.2* 8.0* 8.1*   MAGNESIUM mg/dL 2.0  --   --    PHOSPHORUS mg/dL 3.9  --   --          Results from last 7 days   Lab Units 10/05/24  1136 10/05/24  0801 10/04/24  2119 10/04/24  1623   POC GLUCOSE mg/dl 182* 166* 200* 178*     Results from last 7 days   Lab Units 10/05/24  0444 10/04/24  2057 10/04/24  1400   GLUCOSE RANDOM mg/dL 151* 186* 211*          Results from last 7 days   Lab Units 10/04/24  1900 10/04/24  1617 10/04/24  1400   HS TNI 0HR ng/L  --   --  15   HS TNI 2HR ng/L  --  14  --    HSTNI D2 ng/L  --  -1  --    HS TNI 4HR ng/L 15  --   --    HSTNI D4 ng/L 0  --   --       Results from last 7 days   Lab Units 10/04/24  1400   BNP pg/mL 470*         Past Medical History:   Diagnosis Date    Acute gastritis without hemorrhage     last assessed 3/24/17    Asthma     Atrial fibrillation ([facility])     Bilateral leg edema 02/19/2020    CHF (congestive heart failure) ([facility])     Coronary artery disease     Daytime sleepiness 07/17/2019    Diabetes mellitus ([facility])     Dyspnea on exertion 10/11/2021     Edema of both feet 01/23/2020    Gastric bypass status for obesity     Hyperlipidemia     Hypertension     Hypokalemia 11/14/2021    Impaired fasting glucose     last assessed 5/10/17    Knee sprain, bilateral 06/14/2018    Leg cramps 06/16/2022    Obesity     Viral gastroenteritis     last assessed 11/4/16     Present on Admission:   Paroxysmal atrial fibrillation (HCC)   Acute on chronic diastolic congestive heart failure (HCC)   Primary hypertension   Stage 3a chronic kidney disease (HCC)   Morbid obesity (HCC)   Hyperlipidemia   VICKY (obstructive sleep apnea)   Moderate persistent asthma without complication   Diabetic polyneuropathy associated with type 2 diabetes mellitus (HCC)      Admitting Diagnosis: Acute exacerbation of CHF (congestive heart failure) (HCC) [I50.9]  Age/Sex: 59 y.o. male    Network Utilization Review Department  ATTENTION: Please call with any questions or concerns to [phone] and carefully listen to the prompts so that you are directed to the right person. All voicemails are confidential.   For Discharge needs, contact Care Management DC Support Team at [phone] opt. 2  Send all requests for admission clinical reviews, approved or denied determinations and any other requests to dedicated fax number below belonging to the campus where the patient is receiving treatment. List of dedicated fax numbers for the Facilities:  FACILITY NAME UR FAX NUMBER   ADMISSION DENIALS (Administrative/Medical Necessity) [phone]   DISCHARGE SUPPORT TEAM (NETWORK) [phone]   PARENT CHILD HEALTH (Maternity/NICU/Pediatrics) [phone]   [facility] [phone]   [facility] [phone]   [facility] [phone]   [facility] [phone]   [facility] [phone]   [facility] [phone]   [facility] [phone]    [facility] [phone]   [facility] [phone]   [facility] [phone]   [facility] [phone]   [facility] [phone]

## 2024-10-05 NOTE — ASSESSMENT & PLAN NOTE
Lab Results   Component Value Date    EGFR 40 10/05/2024    EGFR 48 10/04/2024    EGFR 55 10/04/2024    CREATININE 1.81 (H) 10/05/2024    CREATININE 1.55 (H) 10/04/2024    CREATININE 1.40 (H) 10/04/2024     Renal function appears baseline, monitor renal function and electrolytes closely while on diuretic infusion

## 2024-10-05 NOTE — PLAN OF CARE
Problem: CARDIOVASCULAR - ADULT  Goal: Maintains optimal cardiac output and hemodynamic stability  Description: INTERVENTIONS:  - Monitor I/O, vital signs and rhythm  - Monitor for S/S and trends of decreased cardiac output  - Administer and titrate ordered vasoactive medications to optimize hemodynamic stability  - Assess quality of pulses, skin color and temperature  - Assess for signs of decreased coronary artery perfusion  - Instruct patient to report change in severity of symptoms  Outcome: Progressing     Problem: METABOLIC, FLUID AND ELECTROLYTES - ADULT  Goal: Electrolytes maintained within normal limits  Description: INTERVENTIONS:  - Monitor labs and assess patient for signs and symptoms of electrolyte imbalances  - Administer electrolyte replacement as ordered  - Monitor response to electrolyte replacements, including repeat lab results as appropriate  - Instruct patient on fluid and nutrition as appropriate  Outcome: Progressing     Problem: METABOLIC, FLUID AND ELECTROLYTES - ADULT  Goal: Fluid balance maintained  Description: INTERVENTIONS:  - Monitor labs   - Monitor I/O and WT  - Instruct patient on fluid and nutrition as appropriate  - Assess for signs & symptoms of volume excess or deficit  Outcome: Progressing     Problem: METABOLIC, FLUID AND ELECTROLYTES - ADULT  Goal: Glucose maintained within target range  Description: INTERVENTIONS:  - Monitor Blood Glucose as ordered  - Assess for signs and symptoms of hyperglycemia and hypoglycemia  - Administer ordered medications to maintain glucose within target range  - Assess nutritional intake and initiate nutrition service referral as needed  Outcome: Progressing

## 2024-10-05 NOTE — CONSULTS
Podiatry - Consultation    Patient Information:   Mesfin Cano 59 y.o. male MRN: [id number]  Unit/Bed#: [facility] 411-01 Encounter: [id number]  PCP: Soo Chavez MD  Date of Admission:  10/4/2024  Date of Consultation: 10/05/24  Requesting Physician: Gabby Espitia DO      ASSESSMENT:    Mesfin Cano is a 59 y.o. male with:    Onychomycosis   Type 2 diabetes mellitus with peripheral neuropathy  A1c 8.3% (6/19/2024)  Stage IIIa chronic kidney disease  Acute on chronic diastolic congestive heart failure    PLAN:    Patient seen at bedside. No acute pedal concerns at this time.    Elongated toenails x6 were excisionally debrided. See procedure note below.   Rest of Medical care per primary team.  Podiatry signing off at this time, please re-consult with any questions or concerns as needed.    Procedure Note: Nail Debridement   After verbal consent was obtained, patient's elongated nails x 6 (bilateral hallux, 4th and 5th toenails) were sharply debrided to near normal length and thickness and to remove subungual debris using a large nail nipper. The patient tolerated this well and without incident.     Weight bearing Status: Weightbearing as tolerated    SUBJECTIVE:    History of Present Illness:    Mesfin Cano is a 59 y.o. male who is originally admitted 10/4/2024 due to acute on chronic diastolic congestive heart failure with a past medical history of type 2 diabetes mellitus, stage IIIa chronic kidney disease, congestive heart failure.    We are consulted for painful, elongated toenails x 10. Patient states that they have difficulty applying their socks and shoes due to the elongation of the nails. They report pain with palpation and pressure within their shoe gear. They have been unable to cut their nails adequately. Patient has no further pedal complaints at this time.     Patient denies nausea, vomiting, fever, chills, shortness of breath, chest pain.     Review of Systems:    Constitutional: Negative.     HENT: Negative.    Eyes: Negative.    Respiratory: Negative.    Cardiovascular: Negative.    Gastrointestinal: Negative.    Musculoskeletal: Negative  Skin: Elongated nails    Neurological: Numbness and tingling  Psych: Negative.     Past Medical and Surgical History:     Past Medical History:   Diagnosis Date    Acute gastritis without hemorrhage     last assessed 3/24/17    Asthma     Atrial fibrillation (HCC)     Bilateral leg edema 02/19/2020    CHF (congestive heart failure) (HCC)     Coronary artery disease     Daytime sleepiness 07/17/2019    Diabetes mellitus (HCC)     Dyspnea on exertion 10/11/2021    Edema of both feet 01/23/2020    Gastric bypass status for obesity     Hyperlipidemia     Hypertension     Hypokalemia 11/14/2021    Impaired fasting glucose     last assessed 5/10/17    Knee sprain, bilateral 06/14/2018    Leg cramps 06/16/2022    Obesity     Viral gastroenteritis     last assessed 11/4/16       Past Surgical History:   Procedure Laterality Date    CARDIAC CATHETERIZATION N/A 3/9/2022    Procedure: CARDIAC RHC W/ PRESSURE SENSOR;  Surgeon: Aminata Wilcox MD;  Location: [facility];  Service: Cardiology    CARDIAC CATHETERIZATION N/A 9/6/2022    Procedure: Cardiac catheterization;  Surgeon: Yolanda Del Rosario DO;  Location: [facility];  Service: Cardiology    CARDIAC CATHETERIZATION N/A 9/6/2022    Procedure: Cardiac Coronary Angiogram;  Surgeon: Yolanda Del Rosario DO;  Location: [facility];  Service: Cardiology    CARDIAC CATHETERIZATION N/A 10/11/2023    Procedure: Cardiac RHC;  Surgeon: Yoel Darden MD;  Location: [facility];  Service: Cardiology    CARPAL TUNNEL RELEASE      bilateral    CHOLECYSTECTOMY LAPAROSCOPIC N/A 2/7/2024    Procedure: CHOLECYSTECTOMY LAPAROSCOPIC;  Surgeon: Nena Ferguson MD;  Location: [facility];  Service: General    GASTRIC BYPASS  2004    with han en y    HERNIA REPAIR      ventral inscisional    IR BIOPSY BONE  MARROW  12/14/2023    LAPAROSCOPIC TRANSGASTRIC ACCESS FOR ERCP N/A 2/7/2024    Procedure: LAPAROSCOPIC TRANSGASTRIC ACCESS FOR ERCP;  Surgeon: Nena Ferguson MD;  Location: [facility];  Service: General    LAPAROSCOPIC TRANSGASTRIC ACCESS FOR ERCP N/A 2/7/2024    Procedure: ERCP, sphincterotomy, stone extraction;  Surgeon: Rey Ferguson MD;  Location: [facility];  Service: Gastroenterology    TONSILLECTOMY         Meds/Allergies:      Facility-Administered Medications Prior to Admission:     [COMPLETED] furosemide (LASIX) injection 120 mg    Medications Prior to Admission:     Accu-Chek FastClix Lancets MISC    acetaminophen (TYLENOL) 325 mg tablet    albuterol (PROVENTIL HFA,VENTOLIN HFA) 90 mcg/act inhaler    aluminum-magnesium hydroxide 200-200 MG/5ML suspension    atorvastatin (LIPITOR) 10 mg tablet    Blood Glucose Monitoring Suppl (Accu-Chek Guide) w/Device KIT    budesonide-formoterol (Symbicort) 160-4.5 mcg/act inhaler    bumetanide (BUMEX) 2 mg tablet    Eliquis 5 MG    Empagliflozin (JARDIANCE) 10 MG TABS tablet    EPINEPHrine (EPIPEN) 0.3 mg/0.3 mL SOAJ    famotidine (PEPCID) 20 mg tablet    fluticasone (FLONASE) 50 mcg/act nasal spray    Klor-Con M20 20 MEQ tablet    loratadine (CLARITIN) 10 mg tablet    metolazone (ZAROXOLYN) 2.5 mg tablet    metoprolol succinate (TOPROL-XL) 50 mg 24 hr tablet    montelukast (SINGULAIR) 10 mg tablet    nitroglycerin (NITROSTAT) 0.4 mg SL tablet    omeprazole (PriLOSEC) 20 mg delayed release capsule    pregabalin (LYRICA) 50 mg capsule    sitaGLIPtin (Januvia) 100 mg tablet    spironolactone (ALDACTONE) 25 mg tablet    tiotropium (Spiriva Respimat) 1.25 MCG/ACT AERS inhaler    Allergies   Allergen Reactions    Azithromycin Other (See Comments)     Shaky, uneasy feeling     Bactrim [Sulfamethoxazole-Trimethoprim] Other (See Comments)     shakiness       Social History:     Marital Status: Single    Substance Use History:   Social History     Substance and Sexual  "Activity   Alcohol Use Yes    Alcohol/week: 2.0 standard drinks of alcohol    Types: 2 Shots of liquor per week    Comment: social     Social History     Tobacco Use   Smoking Status Former    Current packs/day: 1.00    Average packs/day: 1 pack/day for 5.0 years (5.0 ttl pk-yrs)    Types: Pipe, Cigars, Cigarettes    Start date: 2016    Quit date: 1/1/2021    Passive exposure: Never   Smokeless Tobacco Former   Tobacco Comments    cigar once a day     Social History     Substance and Sexual Activity   Drug Use No       Family History:    Family History   Problem Relation Age of Onset    Stroke Mother     Hypertension Father     Cancer Father     COPD Father          OBJECTIVE:    Vitals:   Blood Pressure: 144/79 (10/05/24 0700)  Pulse: 76 (10/05/24 0700)  Temperature: 98 °F (36.7 °C) (10/05/24 0700)  Temp Source: Oral (10/05/24 0700)  Respirations: 20 (10/05/24 0700)  Height: 6' 1\" (185.4 cm) (10/04/24 2040)  Weight - Scale: (!) 148 kg (327 lb 3.2 oz) (10/05/24 0444)  SpO2: 99 % (10/05/24 0233)    Physical Exam:    General Appearance: Alert, cooperative, no distress.  HEENT: Head normocephalic, atraumatic, without obvious abnormality.  Heart: Normal rate and rhythm.  Lungs: Non-labored breathing. No respiratory distress.  Abdomen: Without distension.  Psychiatric: AAOx3  Lower Extremity:    Vascular:   DP: Right: 2+   Left: doppler signal  PT: Right: 2+   Left: doppler signal  CRT < 3 seconds at the digits.   +2/4 edema noted at bilateral lower extremities.   Pedal hair is absent.   Skin temperature is WNL bilaterally.    Musculoskeletal:  MMT is 5/5 in all muscle compartments bilaterally.   ROM at the 1st MPJ and ankle joint are reduced bilaterally with the leg extended.   No Pain on palpation of bilateral foot and ankle.   No gross deformities noted.     Dermatological:  Elongated, dystrophic, thickened toenails at the bilateral hallux, fourth, and fifth toenails that are tender to palpation with notable subungual " "debris.    Neurological:  Gross sensation is diminished.   Light touch is diminished.   Protective sensation is diminished.    Additional data:     Lab Results: I have personally reviewed pertinent labs including:    Results from last 7 days   Lab Units 10/05/24  0444 10/04/24  1400   WBC Thousand/uL 8.01 8.09   HEMOGLOBIN g/dL 11.2* 11.7*   HEMATOCRIT % 36.7 37.5   PLATELETS Thousands/uL 268 280   SEGS PCT %  --  77*   LYMPHO PCT %  --  9*   MONO PCT %  --  7   EOS PCT %  --  5     Results from last 7 days   Lab Units 10/05/24  0444   POTASSIUM mmol/L 3.3*   CHLORIDE mmol/L 93*   CO2 mmol/L 29   BUN mg/dL 31*   CREATININE mg/dL 1.81*   CALCIUM mg/dL 8.2*           Cultures: I have personally reviewed pertinent cultures including:              Imaging: I have personally reviewed pertinent reports in PACS.  EKG, Pathology, and Other Studies: I have personally reviewed pertinent reports.    Time Spent for Care: 30 minutes.  More than 50% of total time spent on counseling and coordination of care as described above.      ** Please Note: Portions of the record may have been created with voice recognition software. Occasional wrong word or \"sound a like\" substitutions may have occurred due to the inherent limitations of voice recognition software. Read the chart carefully and recognize, using context, where substitutions have occurred. **    "

## 2024-10-06 LAB
ANION GAP SERPL CALCULATED.3IONS-SCNC: 13 MMOL/L (ref 4–13)
BASOPHILS # BLD AUTO: 0.05 THOUSANDS/ΜL (ref 0–0.1)
BASOPHILS NFR BLD AUTO: 1 % (ref 0–1)
BUN SERPL-MCNC: 35 MG/DL (ref 5–25)
CALCIUM SERPL-MCNC: 8.3 MG/DL (ref 8.4–10.2)
CHLORIDE SERPL-SCNC: 99 MMOL/L (ref 96–108)
CO2 SERPL-SCNC: 25 MMOL/L (ref 21–32)
CREAT SERPL-MCNC: 1.82 MG/DL (ref 0.6–1.3)
EOSINOPHIL # BLD AUTO: 0.46 THOUSAND/ΜL (ref 0–0.61)
EOSINOPHIL NFR BLD AUTO: 6 % (ref 0–6)
ERYTHROCYTE [DISTWIDTH] IN BLOOD BY AUTOMATED COUNT: 15.7 % (ref 11.6–15.1)
GFR SERPL CREATININE-BSD FRML MDRD: 40 ML/MIN/1.73SQ M
GLUCOSE SERPL-MCNC: 152 MG/DL (ref 65–140)
GLUCOSE SERPL-MCNC: 166 MG/DL (ref 65–140)
GLUCOSE SERPL-MCNC: 174 MG/DL (ref 65–140)
GLUCOSE SERPL-MCNC: 183 MG/DL (ref 65–140)
GLUCOSE SERPL-MCNC: 233 MG/DL (ref 65–140)
HCT VFR BLD AUTO: 39.5 % (ref 36.5–49.3)
HGB BLD-MCNC: 12.1 G/DL (ref 12–17)
IMM GRANULOCYTES # BLD AUTO: 0.07 THOUSAND/UL (ref 0–0.2)
IMM GRANULOCYTES NFR BLD AUTO: 1 % (ref 0–2)
LYMPHOCYTES # BLD AUTO: 1.04 THOUSANDS/ΜL (ref 0.6–4.47)
LYMPHOCYTES NFR BLD AUTO: 14 % (ref 14–44)
MAGNESIUM SERPL-MCNC: 2.2 MG/DL (ref 1.9–2.7)
MCH RBC QN AUTO: 27.2 PG (ref 26.8–34.3)
MCHC RBC AUTO-ENTMCNC: 30.6 G/DL (ref 31.4–37.4)
MCV RBC AUTO: 89 FL (ref 82–98)
MONOCYTES # BLD AUTO: 0.46 THOUSAND/ΜL (ref 0.17–1.22)
MONOCYTES NFR BLD AUTO: 6 % (ref 4–12)
NEUTROPHILS # BLD AUTO: 5.36 THOUSANDS/ΜL (ref 1.85–7.62)
NEUTS SEG NFR BLD AUTO: 72 % (ref 43–75)
NRBC BLD AUTO-RTO: 0 /100 WBCS
PLATELET # BLD AUTO: 268 THOUSANDS/UL (ref 149–390)
PMV BLD AUTO: 10.3 FL (ref 8.9–12.7)
POTASSIUM SERPL-SCNC: 4.3 MMOL/L (ref 3.5–5.3)
RBC # BLD AUTO: 4.45 MILLION/UL (ref 3.88–5.62)
SODIUM SERPL-SCNC: 137 MMOL/L (ref 135–147)
WBC # BLD AUTO: 7.44 THOUSAND/UL (ref 4.31–10.16)

## 2024-10-06 PROCEDURE — 85025 COMPLETE CBC W/AUTO DIFF WBC: CPT | Performed by: STUDENT IN AN ORGANIZED HEALTH CARE EDUCATION/TRAINING PROGRAM

## 2024-10-06 PROCEDURE — 99232 SBSQ HOSP IP/OBS MODERATE 35: CPT | Performed by: PHYSICIAN ASSISTANT

## 2024-10-06 PROCEDURE — 80048 BASIC METABOLIC PNL TOTAL CA: CPT | Performed by: PHYSICIAN ASSISTANT

## 2024-10-06 PROCEDURE — 82948 REAGENT STRIP/BLOOD GLUCOSE: CPT

## 2024-10-06 PROCEDURE — 99232 SBSQ HOSP IP/OBS MODERATE 35: CPT | Performed by: STUDENT IN AN ORGANIZED HEALTH CARE EDUCATION/TRAINING PROGRAM

## 2024-10-06 PROCEDURE — 83735 ASSAY OF MAGNESIUM: CPT | Performed by: STUDENT IN AN ORGANIZED HEALTH CARE EDUCATION/TRAINING PROGRAM

## 2024-10-06 RX ADMIN — Medication 1.5 MG/HR: at 06:32

## 2024-10-06 RX ADMIN — EMPAGLIFLOZIN 10 MG: 10 TABLET, FILM COATED ORAL at 12:28

## 2024-10-06 RX ADMIN — INSULIN LISPRO 2 UNITS: 100 INJECTION, SOLUTION INTRAVENOUS; SUBCUTANEOUS at 17:12

## 2024-10-06 RX ADMIN — INSULIN LISPRO 4 UNITS: 100 INJECTION, SOLUTION INTRAVENOUS; SUBCUTANEOUS at 12:27

## 2024-10-06 RX ADMIN — APIXABAN 5 MG: 5 TABLET, FILM COATED ORAL at 08:32

## 2024-10-06 RX ADMIN — PREGABALIN 50 MG: 50 CAPSULE ORAL at 22:57

## 2024-10-06 RX ADMIN — POTASSIUM CHLORIDE 40 MEQ: 1500 TABLET, EXTENDED RELEASE ORAL at 17:12

## 2024-10-06 RX ADMIN — BUDESONIDE AND FORMOTEROL FUMARATE DIHYDRATE 2 PUFF: 160; 4.5 AEROSOL RESPIRATORY (INHALATION) at 08:33

## 2024-10-06 RX ADMIN — UMECLIDINIUM 1 PUFF: 62.5 AEROSOL, POWDER ORAL at 08:33

## 2024-10-06 RX ADMIN — FAMOTIDINE 20 MG: 20 TABLET, FILM COATED ORAL at 08:32

## 2024-10-06 RX ADMIN — INSULIN LISPRO 2 UNITS: 100 INJECTION, SOLUTION INTRAVENOUS; SUBCUTANEOUS at 22:56

## 2024-10-06 RX ADMIN — METOPROLOL SUCCINATE 50 MG: 50 TABLET, EXTENDED RELEASE ORAL at 08:32

## 2024-10-06 RX ADMIN — APIXABAN 5 MG: 5 TABLET, FILM COATED ORAL at 17:11

## 2024-10-06 RX ADMIN — ATORVASTATIN CALCIUM 10 MG: 10 TABLET, FILM COATED ORAL at 08:32

## 2024-10-06 RX ADMIN — Medication 0.5 MG/HR: at 22:19

## 2024-10-06 RX ADMIN — INSULIN LISPRO 2 UNITS: 100 INJECTION, SOLUTION INTRAVENOUS; SUBCUTANEOUS at 08:25

## 2024-10-06 RX ADMIN — POTASSIUM CHLORIDE 40 MEQ: 1500 TABLET, EXTENDED RELEASE ORAL at 08:32

## 2024-10-06 RX ADMIN — BUDESONIDE AND FORMOTEROL FUMARATE DIHYDRATE 2 PUFF: 160; 4.5 AEROSOL RESPIRATORY (INHALATION) at 17:12

## 2024-10-06 NOTE — ASSESSMENT & PLAN NOTE
Lab Results   Component Value Date    EGFR 40 10/06/2024    EGFR 40 10/05/2024    EGFR 48 10/04/2024    CREATININE 1.82 (H) 10/06/2024    CREATININE 1.81 (H) 10/05/2024    CREATININE 1.55 (H) 10/04/2024     Renal function appears baseline, monitor renal function and electrolytes closely while on diuretic infusion

## 2024-10-06 NOTE — ASSESSMENT & PLAN NOTE
Lab Results   Component Value Date    HGBA1C 8.3 (H) 06/19/2024       Recent Labs     10/05/24  1136 10/05/24  1651 10/05/24  2131 10/06/24  0801   POCGLU 182* 183* 170* 166*       Blood Sugar Average: Last 72 hrs:  (P) 177.9037292776590271    Hold home antihyperglycemics and proceed with ISS  Consistent carb diet

## 2024-10-06 NOTE — PROGRESS NOTES
Progress Note - Hospitalist   Name: Mesfin Cano 59 y.o. male I MRN: [id number]  [facility] I Date of Admission: 10/4/2024   Date of Service: 10/6/2024 I Hospital Day: 2    Assessment & Plan  Acute on chronic diastolic congestive heart failure (HCC)  Wt Readings from Last 3 Encounters:   10/06/24 (!) 146 kg (321 lb 6.9 oz)   10/03/24 (!) 150 kg (331 lb 11.2 oz)   10/01/24 (!) 147 kg (324 lb)   This is a 59-year-old gentleman known to the cardiology and heart failure services for frequent exacerbations of his heart failure with preserved LVEF.  Seen in cardiology office earlier in the week and received IV Lasix 120 mg x 1 with poor urine output and weight gain above baseline approximately 16 pounds.  Dry weight 319.  Adherent to medications and lifestyle needs.  Home regimen: Bumex 6 mg 3 times daily with as needed metolazone, spironolactone  Last echocardiogram in our system appears to demonstrate EF 55% in 6/20/24 (diastolic function could not be assessed due to afib)  Bumex drip decreased to 0.5   Resume Jardiance  Spironolactone held  Heart failure team consulted, appreciate recommendations  Monitor electrolytes and telemetry  Primary hypertension  Continue Toprol XL and monitor BP closely while on diuretic gtt  Hyperlipidemia  Continue atorvastatin 10 mg daily  Morbid obesity (HCC)  Patient would strongly benefit from weight loss therapy  VICKY (obstructive sleep apnea)  Not currently qualified for CPAP therapy, recommend outpt followup  Paroxysmal atrial fibrillation (HCC)  Rate controlled on Toprol  Anticoagulated on eliquis  Monitor on telemetry  Stage 3a chronic kidney disease (HCC)  Lab Results   Component Value Date    EGFR 40 10/06/2024    EGFR 40 10/05/2024    EGFR 48 10/04/2024    CREATININE 1.82 (H) 10/06/2024    CREATININE 1.81 (H) 10/05/2024    CREATININE 1.55 (H) 10/04/2024     Renal function appears baseline, monitor renal function and electrolytes closely while on diuretic  infusion  Diabetic polyneuropathy associated with type 2 diabetes mellitus (HCC)  Lab Results   Component Value Date    HGBA1C 8.3 (H) 06/19/2024       Recent Labs     10/05/24  1136 10/05/24  1651 10/05/24  2131 10/06/24  0801   POCGLU 182* 183* 170* 166*       Blood Sugar Average: Last 72 hrs:  (P) 177.6220991369402559    Hold home antihyperglycemics and proceed with ISS  Consistent carb diet  Moderate persistent asthma without complication  Continue albuterol, Symbicort, and umeclidinium    Onychomycosis  Podiatry consulted for nail clipping as patient has extensively elongated LE nails  Electrolyte abnormality  Potassium 4.3, stable  Magnesium 2.2, stable  Monitor     VTE Pharmacologic Prophylaxis:   Moderate Risk (Score 3-4) - Pharmacological DVT Prophylaxis Ordered: apixaban (Eliquis).    Mobility:   Basic Mobility Inpatient Raw Score: 22  JH-HLM Goal: 7: Walk 25 feet or more  JH-HLM Achieved: 6: Walk 10 steps or more  JH-HLM Goal NOT achieved. Continue with multidisciplinary rounding and encourage appropriate mobility to improve upon JH-HLM goals.    Patient Centered Rounds: I performed bedside rounds with nursing staff today.   Discussions with Specialists or Other Care Team Provider: RN    Education and Discussions with Family / Patient: Patient declined call to .     Current Length of Stay: 2 day(s)  Current Patient Status: Inpatient   Certification Statement: The patient will continue to require additional inpatient hospital stay due to IV diuresis  Discharge Plan: Anticipate discharge in 48-72 hrs to home.    Code Status: Level 1 - Full Code    Subjective   Patient assessed at bedside.  No acute complaints.  Reports breathing is slightly better today.  Lower extremity slightly better today.  Good urine output overnight.    Objective :  Temp:  [97.6 °F (36.4 °C)-98.2 °F (36.8 °C)] 97.6 °F (36.4 °C)  HR:  [70-77] 74  BP: (115-133)/(56-73) 133/65  Resp:  [20] 20  SpO2:  [97 %-100 %] 100 %  O2  Device: None (Room air)    Body mass index is 42.41 kg/m².     Input and Output Summary (last 24 hours):     Intake/Output Summary (Last 24 hours) at 10/6/2024 1136  Last data filed at 10/6/2024 0908  Gross per 24 hour   Intake 725.2 ml   Output 4725 ml   Net -3999.8 ml       Physical Exam  Vitals reviewed.   Constitutional:       General: He is not in acute distress.     Appearance: Normal appearance. He is not ill-appearing.   HENT:      Head: Normocephalic and atraumatic.   Cardiovascular:      Rate and Rhythm: Normal rate and regular rhythm.      Heart sounds: Normal heart sounds.   Pulmonary:      Effort: Pulmonary effort is normal. No respiratory distress.   Abdominal:      General: Bowel sounds are normal. There is distension.      Tenderness: There is no abdominal tenderness.   Musculoskeletal:      Right lower leg: Edema present.      Left lower leg: Edema present.   Neurological:      Mental Status: He is alert and oriented to person, place, and time. Mental status is at baseline.         Lines/Drains:        Telemetry:  Telemetry Orders (From admission, onward)               24 Hour Telemetry Monitoring  Continuous x 24 Hours (Telem)        Question:  Reason for 24 Hour Telemetry  Answer:  Decompensated CHF- and any one of the following: continuous diuretic infusion or total diuretic dose >200 mg daily, associated electrolyte derangement (I.e. K < 3.0), ionotropic drip (continuous infusion), hx of ventricular arrhythmia, or new EF < 35%                              Lab Results: I have reviewed the following results:   Results from last 7 days   Lab Units 10/06/24  0431   WBC Thousand/uL 7.44   HEMOGLOBIN g/dL 12.1   HEMATOCRIT % 39.5   PLATELETS Thousands/uL 268   SEGS PCT % 72   LYMPHO PCT % 14   MONO PCT % 6   EOS PCT % 6     Results from last 7 days   Lab Units 10/06/24  0431   SODIUM mmol/L 137   POTASSIUM mmol/L 4.3   CHLORIDE mmol/L 99   CO2 mmol/L 25   BUN mg/dL 35*   CREATININE mg/dL 1.82*   ANION  GAP mmol/L 13   CALCIUM mg/dL 8.3*   GLUCOSE RANDOM mg/dL 152*         Results from last 7 days   Lab Units 10/06/24  0801 10/05/24  2131 10/05/24  1651 10/05/24  1136 10/05/24  0801 10/04/24  2119 10/04/24  1623   POC GLUCOSE mg/dl 166* 170* 183* 182* 166* 200* 178*               Recent Cultures (last 7 days):         Imaging Results Review: No pertinent imaging studies reviewed.  Other Study Results Review: No additional pertinent studies reviewed.    Last 24 Hours Medication List:     Current Facility-Administered Medications:     acetaminophen (TYLENOL) tablet 650 mg, Q6H PRN    albuterol (PROVENTIL HFA,VENTOLIN HFA) inhaler 2 puff, Q6H PRN    apixaban (ELIQUIS) tablet 5 mg, BID    atorvastatin (LIPITOR) tablet 10 mg, Daily    budesonide-formoterol (SYMBICORT) 160-4.5 mcg/act inhaler 2 puff, BID    bumetanide (BUMEX) 12.5 mg infusion 50 mL, Continuous, Last Rate: 0.5 mg/hr (10/06/24 1042)    Empagliflozin (JARDIANCE) tablet 10 mg, Daily    famotidine (PEPCID) tablet 20 mg, Daily    insulin lispro (HumALOG/ADMELOG) 100 units/mL subcutaneous injection 2-12 Units, TID AC **AND** Fingerstick Glucose (POCT), TID AC    insulin lispro (HumALOG/ADMELOG) 100 units/mL subcutaneous injection 2-12 Units, HS    metoprolol succinate (TOPROL-XL) 24 hr tablet 50 mg, Daily    nitroglycerin (NITROSTAT) SL tablet 0.4 mg, Q5 Min PRN    potassium chloride (Klor-Con M20) CR tablet 40 mEq, BID    pregabalin (LYRICA) capsule 50 mg, HS    umeclidinium 62.5 mcg/actuation inhaler AEPB 1 puff, Daily    Administrative Statements   Today, Patient Was Seen By: Gabby Espitia, DO  I have spent a total time of 25 minutes in caring for this patient on the day of the visit/encounter including Counseling / Coordination of care, Documenting in the medical record, Reviewing / ordering tests, medicine, procedures  , Obtaining or reviewing history  , and Communicating with other healthcare professionals .    **Please Note: This note may have been  constructed using a voice recognition system.**

## 2024-10-06 NOTE — PROGRESS NOTES
Advanced Heart Failure / Pulmonary Hypertension Service Progress Note    Mesfin Cano 59 y.o. male   MRN: [id number]  Unit/Bed#: [facility] 411-01; Encounter: [id number]    Assessment:  Principal Problem:    Acute on chronic diastolic congestive heart failure (HCC)  Active Problems:    Primary hypertension    Hyperlipidemia    Morbid obesity (HCC)    VICKY (obstructive sleep apnea)    Paroxysmal atrial fibrillation (HCC)    Stage 3a chronic kidney disease (HCC)    Diabetic polyneuropathy associated with type 2 diabetes mellitus (HCC)    Moderate persistent asthma without complication    Onychomycosis    Electrolyte abnormality      Subjective:   Mesfin Cano is a 59-year-old man with PMH as below who presented to [facility] on 10/04/2024 with acute on chronic HF. Seen in cardiology office earlier this week and received 120 mg IV Lasix. Patient with weight gain the following day and lackluster urine output; advised to proceed to ED for admission.      Patient seen and examined. No significant events overnight. Feeling better today with lesser TELLES and LE swelling. Reports significant urine output with Bumex drip.    Objective:   Intake/ Output: 725.2 mL / 4225 mL. Intake accurate.   Weight: 321 lbs (327 lbs on 10/05).   Telemetry: rates in 70s.   MAPs: 80-90.    Today's Plan:  Robust response with Bumex drip; will reduced dose to 0.5 mg/hr. May be able to transition to PO in next 24-36 hours.   Estimated dry weight in 315-320 range (?).  Restart Jardiance today.   Potassium 4.3 and magnesium 2.2 this AM. Continue to monitor.   Outpatient spironolactone on hold.    Plan:  Acute on chronic HFpEF; LVEF 55%; LVIDd 6.0 cm              Etiology: Afib, HTN, obesity phenotype.              LHC 09/06/2022: no obstructive CAD.              TTE 04/11/2023: LVEF 50%. LVIDd 6.6 cm. Grade 2 DD. Normal RV. Trace MR and TR. Normal IVC.               RHC / MEMS calibration 10/11/2023: CardioMEMS data correlates to RHC.            "    TTE 06/20/2024: LVEF 55%. Normal RV. BRIAN (L>R). Normal IVC.      Guideline Directed Medical Therapy:  --ARNi / ACEi / ARB: none.   --Aldosterone Antagonist:  Held.  --SGLT2 Inhibitor: empagliflozin 10 mg daily.      Volume Management:  --Home Diuretic: Bumex 6 mg TID with PRN metolazone 5 mg.   --Inpatient Diuretic: IV Bumex 0.5 mg/hr.   --K supplementation: potassium 40 mEq BID.                  Advanced Therapies:              --CardioMEMS: implanted 03/09/2022. Goal PAd of 15-18.     Atrial fibrillation, parosxysmal              [id number] = 3 (HF, HTN, DM).              Anticoagulation on Eliquis.               Rate control: metoprolol succinate 50 mg daily.              Rhythm control: No.     Chronic kidney disease, stage IIIb              Baseline creatinine of 1.5-2.0.              Today, creatinine of 1.82 (1.81 on 10/05).      Hypertension  Hyperlipidemia  Asthma, severe persistent: follows with Dr. Noonan as outpatient.   Obstructive sleep apnea: without CPAP and equipment for >12 months. In process of getting new equipment.   Diabetes mellitus, type II  History of gastric bypass (Samuel-en-Y) surgery   History of tobacco abuse    Vitals:   Blood pressure 133/65, pulse 74, temperature 97.6 °F (36.4 °C), temperature source Oral, resp. rate 20, height 6' 1\" (1.854 m), weight (!) 146 kg (321 lb 6.9 oz), SpO2 100%.    I/O last 3 completed shifts:  In: 725.2 [P.O.:540; I.V.:185.2]  Out: 6575 [Urine:6575]    Weight (last 2 days)       Date/Time Weight    10/06/24 0532 146 (321.43)    10/06/24 0450 146 (321.4)    10/05/24 0444 148 (327.2)    10/04/24 2040 149 (328)          Wt Readings from Last 10 Encounters:   10/06/24 (!) 146 kg (321 lb 6.9 oz)   10/03/24 (!) 150 kg (331 lb 11.2 oz)   10/01/24 (!) 147 kg (324 lb)   08/20/24 (!) 144 kg (317 lb)   08/08/24 (!) 145 kg (319 lb)   08/08/24 (!) 149 kg (328 lb 3.2 oz)   08/04/24 (!) 144 kg (318 lb 6.4 oz)   07/19/24 (!) 150 kg (330 lb)   06/26/24 (!) 149 kg (328 lb " 3.2 oz)   05/24/24 (!) 150 kg (331 lb 9.6 oz)     Vitals:    10/06/24 0450 10/06/24 0532 10/06/24 0700 10/06/24 0831   BP:   133/65    BP Location:   Left arm    Pulse:   74    Resp:   20    Temp:   97.6 °F (36.4 °C)    TempSrc:   Oral    SpO2:    100%   Weight: (!) 146 kg (321 lb 6.4 oz) (!) 146 kg (321 lb 6.9 oz)     Height:           Physical Exam  Vitals reviewed.   Constitutional:       General: He is awake. He is not in acute distress.     Appearance: Normal appearance. He is well-developed and overweight. He is not ill-appearing, toxic-appearing or diaphoretic.   HENT:      Head: Normocephalic.      Nose: Nose normal.      Mouth/Throat:      Mouth: Mucous membranes are moist.   Eyes:      General: No scleral icterus.     Conjunctiva/sclera: Conjunctivae normal.   Neck:      Vascular: JVD present.      Trachea: No tracheal deviation.   Cardiovascular:      Rate and Rhythm: Normal rate and regular rhythm.   Pulmonary:      Effort: Pulmonary effort is normal. No tachypnea, bradypnea or respiratory distress.      Breath sounds: Normal air entry. No decreased air movement. No wheezing.   Abdominal:      General: There is distension.      Palpations: Abdomen is soft.   Musculoskeletal:      Cervical back: Neck supple.      Right lower leg: Edema present.      Left lower leg: Edema present.   Skin:     General: Skin is warm and dry.      Coloration: Skin is pale. Skin is not jaundiced.   Neurological:      General: No focal deficit present.      Mental Status: He is alert and oriented to person, place, and time.   Psychiatric:         Attention and Perception: Attention normal.         Mood and Affect: Mood and affect normal.         Speech: Speech normal.         Behavior: Behavior normal. Behavior is cooperative.         Thought Content: Thought content normal.         Current Facility-Administered Medications:     acetaminophen (TYLENOL) tablet 650 mg, 650 mg, Oral, Q6H PRN, Christiano Leavitt    albuterol  (PROVENTIL HFA,VENTOLIN HFA) inhaler 2 puff, 2 puff, Inhalation, Q6H PRN, Christiano Leavitt    apixaban (ELIQUIS) tablet 5 mg, 5 mg, Oral, BID, Christiano Leavitt, 5 mg at 10/06/24 0832    atorvastatin (LIPITOR) tablet 10 mg, 10 mg, Oral, Daily, Christiano Leavitt, 10 mg at 10/06/24 0832    budesonide-formoterol (SYMBICORT) 160-4.5 mcg/act inhaler 2 puff, 2 puff, Inhalation, BID, Christiano Leavitt, 2 puff at 10/06/24 0833    bumetanide (BUMEX) 12.5 mg infusion 50 mL, 0.5 mg/hr, Intravenous, Continuous, Aster García PA-C, Last Rate: 6 mL/hr at 10/06/24 0632, 1.5 mg/hr at 10/06/24 0632    Empagliflozin (JARDIANCE) tablet 10 mg, 10 mg, Oral, Daily, Aster García PA-C    famotidine (PEPCID) tablet 20 mg, 20 mg, Oral, Daily, Christiano Leavitt, 20 mg at 10/06/24 0832    insulin lispro (HumALOG/ADMELOG) 100 units/mL subcutaneous injection 2-12 Units, 2-12 Units, Subcutaneous, TID AC, 2 Units at 10/06/24 0825 **AND** Fingerstick Glucose (POCT), , , TID AC, Christiano Leavitt    insulin lispro (HumALOG/ADMELOG) 100 units/mL subcutaneous injection 2-12 Units, 2-12 Units, Subcutaneous, HS, Christiano Leavitt, 2 Units at 10/05/24 2132    metoprolol succinate (TOPROL-XL) 24 hr tablet 50 mg, 50 mg, Oral, Daily, Christiano Leavitt, 50 mg at 10/06/24 0832    nitroglycerin (NITROSTAT) SL tablet 0.4 mg, 0.4 mg, Sublingual, Q5 Min PRN, Christiano Leavitt    potassium chloride (Klor-Con M20) CR tablet 40 mEq, 40 mEq, Oral, BID, Christiano Leavitt, 40 mEq at 10/06/24 0832    pregabalin (LYRICA) capsule 50 mg, 50 mg, Oral, HS, Wm Muñoz PA-C, 50 mg at 10/05/24 2132    umeclidinium 62.5 mcg/actuation inhaler AEPB 1 puff, 1 puff, Inhalation, Daily, Christiano Leavitt, 1 puff at 10/06/24 0833    Labs & Results:      Results from last 7 days   Lab Units 10/06/24  0431 10/05/24  0444 10/04/24  1400   WBC Thousand/uL 7.44 8.01 8.09   HEMOGLOBIN g/dL 12.1 11.2* 11.7*   HEMATOCRIT % 39.5 36.7 37.5   PLATELETS  Thousands/uL 268 268 280         Results from last 7 days   Lab Units 10/06/24  0431 10/05/24  0444 10/04/24  2057   POTASSIUM mmol/L 4.3 3.3* 4.1   CHLORIDE mmol/L 99 93* 95*   CO2 mmol/L 25 29 25   BUN mg/dL 35* 31* 30*   CREATININE mg/dL 1.82* 1.81* 1.55*   CALCIUM mg/dL 8.3* 8.2* 8.0*         Aster García PA-C

## 2024-10-06 NOTE — ASSESSMENT & PLAN NOTE
Wt Readings from Last 3 Encounters:   10/06/24 (!) 146 kg (321 lb 6.9 oz)   10/03/24 (!) 150 kg (331 lb 11.2 oz)   10/01/24 (!) 147 kg (324 lb)   This is a 59-year-old gentleman known to the cardiology and heart failure services for frequent exacerbations of his heart failure with preserved LVEF.  Seen in cardiology office earlier in the week and received IV Lasix 120 mg x 1 with poor urine output and weight gain above baseline approximately 16 pounds.  Dry weight 319.  Adherent to medications and lifestyle needs.  Home regimen: Bumex 6 mg 3 times daily with as needed metolazone, spironolactone  Last echocardiogram in our system appears to demonstrate EF 55% in 6/20/24 (diastolic function could not be assessed due to afib)  Bumex drip decreased to 0.5   Resume Jardiance  Spironolactone held  Heart failure team consulted, appreciate recommendations  Monitor electrolytes and telemetry

## 2024-10-07 ENCOUNTER — PATIENT OUTREACH (OUTPATIENT)
Dept: CARDIOLOGY CLINIC | Facility: CLINIC | Age: 59
End: 2024-10-07

## 2024-10-07 PROBLEM — E61.1 IRON DEFICIENCY: Status: ACTIVE | Noted: 2024-10-07

## 2024-10-07 LAB
ANION GAP SERPL CALCULATED.3IONS-SCNC: 15 MMOL/L (ref 4–13)
BUN SERPL-MCNC: 39 MG/DL (ref 5–25)
CALCIUM SERPL-MCNC: 8.8 MG/DL (ref 8.4–10.2)
CHLORIDE SERPL-SCNC: 96 MMOL/L (ref 96–108)
CO2 SERPL-SCNC: 25 MMOL/L (ref 21–32)
CREAT SERPL-MCNC: 1.93 MG/DL (ref 0.6–1.3)
FERRITIN SERPL-MCNC: 25 NG/ML (ref 24–336)
GFR SERPL CREATININE-BSD FRML MDRD: 37 ML/MIN/1.73SQ M
GLUCOSE SERPL-MCNC: 171 MG/DL (ref 65–140)
GLUCOSE SERPL-MCNC: 180 MG/DL (ref 65–140)
GLUCOSE SERPL-MCNC: 185 MG/DL (ref 65–140)
GLUCOSE SERPL-MCNC: 196 MG/DL (ref 65–140)
GLUCOSE SERPL-MCNC: 211 MG/DL (ref 65–140)
IRON SATN MFR SERPL: 10 % (ref 15–50)
IRON SERPL-MCNC: 50 UG/DL (ref 50–212)
POTASSIUM SERPL-SCNC: 3.8 MMOL/L (ref 3.5–5.3)
SODIUM SERPL-SCNC: 136 MMOL/L (ref 135–147)
TIBC SERPL-MCNC: 485 UG/DL (ref 250–450)
UIBC SERPL-MCNC: 435 UG/DL (ref 155–355)
VIT B12 SERPL-MCNC: 171 PG/ML (ref 180–914)

## 2024-10-07 PROCEDURE — 82607 VITAMIN B-12: CPT | Performed by: STUDENT IN AN ORGANIZED HEALTH CARE EDUCATION/TRAINING PROGRAM

## 2024-10-07 PROCEDURE — 99232 SBSQ HOSP IP/OBS MODERATE 35: CPT | Performed by: STUDENT IN AN ORGANIZED HEALTH CARE EDUCATION/TRAINING PROGRAM

## 2024-10-07 PROCEDURE — 80048 BASIC METABOLIC PNL TOTAL CA: CPT | Performed by: PHYSICIAN ASSISTANT

## 2024-10-07 PROCEDURE — 83550 IRON BINDING TEST: CPT | Performed by: STUDENT IN AN ORGANIZED HEALTH CARE EDUCATION/TRAINING PROGRAM

## 2024-10-07 PROCEDURE — 99232 SBSQ HOSP IP/OBS MODERATE 35: CPT | Performed by: NURSE PRACTITIONER

## 2024-10-07 PROCEDURE — 82948 REAGENT STRIP/BLOOD GLUCOSE: CPT

## 2024-10-07 PROCEDURE — 82728 ASSAY OF FERRITIN: CPT | Performed by: STUDENT IN AN ORGANIZED HEALTH CARE EDUCATION/TRAINING PROGRAM

## 2024-10-07 PROCEDURE — 83540 ASSAY OF IRON: CPT | Performed by: STUDENT IN AN ORGANIZED HEALTH CARE EDUCATION/TRAINING PROGRAM

## 2024-10-07 RX ORDER — CYANOCOBALAMIN 1000 UG/ML
1000 INJECTION, SOLUTION INTRAMUSCULAR; SUBCUTANEOUS
Status: DISCONTINUED | OUTPATIENT
Start: 2024-10-07 | End: 2024-10-10 | Stop reason: HOSPADM

## 2024-10-07 RX ORDER — BUMETANIDE 2 MG/1
6 TABLET ORAL
Status: DISCONTINUED | OUTPATIENT
Start: 2024-10-07 | End: 2024-10-10 | Stop reason: HOSPADM

## 2024-10-07 RX ORDER — INSULIN GLARGINE 100 [IU]/ML
6 INJECTION, SOLUTION SUBCUTANEOUS
Status: DISCONTINUED | OUTPATIENT
Start: 2024-10-07 | End: 2024-10-10 | Stop reason: HOSPADM

## 2024-10-07 RX ADMIN — FAMOTIDINE 20 MG: 20 TABLET, FILM COATED ORAL at 08:22

## 2024-10-07 RX ADMIN — METOPROLOL SUCCINATE 50 MG: 50 TABLET, EXTENDED RELEASE ORAL at 08:22

## 2024-10-07 RX ADMIN — APIXABAN 5 MG: 5 TABLET, FILM COATED ORAL at 08:22

## 2024-10-07 RX ADMIN — INSULIN LISPRO 2 UNITS: 100 INJECTION, SOLUTION INTRAVENOUS; SUBCUTANEOUS at 12:08

## 2024-10-07 RX ADMIN — INSULIN LISPRO 4 UNITS: 100 INJECTION, SOLUTION INTRAVENOUS; SUBCUTANEOUS at 22:54

## 2024-10-07 RX ADMIN — BUDESONIDE AND FORMOTEROL FUMARATE DIHYDRATE 2 PUFF: 160; 4.5 AEROSOL RESPIRATORY (INHALATION) at 08:23

## 2024-10-07 RX ADMIN — UMECLIDINIUM 1 PUFF: 62.5 AEROSOL, POWDER ORAL at 08:22

## 2024-10-07 RX ADMIN — PREGABALIN 50 MG: 50 CAPSULE ORAL at 22:53

## 2024-10-07 RX ADMIN — INSULIN LISPRO 2 UNITS: 100 INJECTION, SOLUTION INTRAVENOUS; SUBCUTANEOUS at 08:22

## 2024-10-07 RX ADMIN — EMPAGLIFLOZIN 10 MG: 10 TABLET, FILM COATED ORAL at 08:23

## 2024-10-07 RX ADMIN — ATORVASTATIN CALCIUM 10 MG: 10 TABLET, FILM COATED ORAL at 08:22

## 2024-10-07 RX ADMIN — BUMETANIDE 6 MG: 2 TABLET ORAL at 17:22

## 2024-10-07 RX ADMIN — CYANOCOBALAMIN 1000 MCG: 1000 INJECTION, SOLUTION INTRAMUSCULAR; SUBCUTANEOUS at 12:08

## 2024-10-07 RX ADMIN — INSULIN LISPRO 2 UNITS: 100 INJECTION, SOLUTION INTRAVENOUS; SUBCUTANEOUS at 17:23

## 2024-10-07 RX ADMIN — IRON SUCROSE 200 MG: 20 INJECTION, SOLUTION INTRAVENOUS at 11:13

## 2024-10-07 RX ADMIN — POTASSIUM CHLORIDE 40 MEQ: 1500 TABLET, EXTENDED RELEASE ORAL at 08:21

## 2024-10-07 RX ADMIN — POTASSIUM CHLORIDE 40 MEQ: 1500 TABLET, EXTENDED RELEASE ORAL at 17:22

## 2024-10-07 RX ADMIN — APIXABAN 5 MG: 5 TABLET, FILM COATED ORAL at 17:22

## 2024-10-07 RX ADMIN — INSULIN GLARGINE 6 UNITS: 100 INJECTION, SOLUTION SUBCUTANEOUS at 22:53

## 2024-10-07 RX ADMIN — BUDESONIDE AND FORMOTEROL FUMARATE DIHYDRATE 2 PUFF: 160; 4.5 AEROSOL RESPIRATORY (INHALATION) at 17:20

## 2024-10-07 NOTE — PLAN OF CARE
Problem: PAIN - ADULT  Goal: Verbalizes/displays adequate comfort level or baseline comfort level  Description: Interventions:  - Encourage patient to monitor pain and request assistance  - Assess pain using appropriate pain scale  - Administer analgesics based on type and severity of pain and evaluate response  - Implement non-pharmacological measures as appropriate and evaluate response  - Consider cultural and social influences on pain and pain management  - Notify physician/advanced practitioner if interventions unsuccessful or patient reports new pain  Outcome: Progressing     Problem: CARDIOVASCULAR - ADULT  Goal: Absence of cardiac dysrhythmias or at baseline rhythm  Description: INTERVENTIONS:  - Continuous cardiac monitoring, vital signs, obtain 12 lead EKG if ordered  - Administer antiarrhythmic and heart rate control medications as ordered  - Monitor electrolytes and administer replacement therapy as ordered  Outcome: Progressing

## 2024-10-07 NOTE — ASSESSMENT & PLAN NOTE
Lab Results   Component Value Date    HGBA1C 8.3 (H) 06/19/2024       Recent Labs     10/06/24  1626 10/06/24  2231 10/07/24  0753 10/07/24  1128   POCGLU 183* 174* 171* 185*       Blood Sugar Average: Last 72 hrs:  (P) 182.9826259199384610    Hold home antihyperglycemics   Start Lantus 6 units at bedtime with mealtime correctional scale  Consistent carb diet

## 2024-10-07 NOTE — ASSESSMENT & PLAN NOTE
Lab Results   Component Value Date    EGFR 37 10/07/2024    EGFR 40 10/06/2024    EGFR 40 10/05/2024    CREATININE 1.93 (H) 10/07/2024    CREATININE 1.82 (H) 10/06/2024    CREATININE 1.81 (H) 10/05/2024     Renal function appears baseline, monitor renal function and electrolytes closely while on diuretic infusion

## 2024-10-07 NOTE — ASSESSMENT & PLAN NOTE
[id number] = 3 (HF, HTN, DM).  Anticoagulation on Eliquis.   Rate control: metoprolol succinate 50 mg daily.  Rhythm control: No.

## 2024-10-07 NOTE — PROGRESS NOTES
Patient listed on Advanced Heart Failure Census at [facility].     Outpatient Advanced Heart Failure LCSW completed electronic chart review to prepare for rounds with the HF Team. Pt resides with spouse in [address] in [place]. There is no bathroom on the 1st floor. Patient sleeps on the couch on 1st floor. He does have a scale however his CPAP is not working per Inpt CM Assessment Progress Note dated 10/5/24.    Rounded with the HF Team. Dr. Wilcox examined patient and discussed Today's Plan. Patient stated that he spoke with scheduling for a new sleep study and they are going to call him back and set up a home sleep study.     Referral for outpatient social work not entered at this time.   Please enter referral if outpatient resources are needed in the future.

## 2024-10-07 NOTE — ASSESSMENT & PLAN NOTE
-without CPAP and equipment for >12 months. In process of getting new equipment.    Additional Notes: Patient is a fraternal twin. Born at 37 weeks, 5 lbs 14 oz. Initially stayed in the NICU for evaluation of coarctation of the aorta.  Evaluation was normal. \\nBoth hemangiomas have been growing gradually. No problems with feeding. \\nBoth parents deny history of hemangiomas. Twin brother is unaffected. \\nWill refer to [facility] for further evaluation and management. Detail Level: Simple Render Risk Assessment In Note?: no

## 2024-10-07 NOTE — PROGRESS NOTES
Progress Note - Heart Failure   Name: Mesfin Cano 59 y.o. male I MRN: [id number]  Unit/Bed#: [facility] 411-01 I Date of Admission: 10/4/2024   Date of Service: 10/7/2024 I Hospital Day: 3         Subjective:   Mesfin Cano is a 59-year-old man with PMH as below who presented to [facility] on 10/04/2024 with acute on chronic HF. Seen in cardiology office earlier this week and received 120 mg IV Lasix. Patient with weight gain the following day and lackluster urine output; advised to proceed to ED for admission.      Patient seen and examined. No significant events overnight.   Negative fluid balance but with no weight loss ? Feeling better     Objective:   Intake/ Output: 490/3350/-2.9 L  Weight: 321 lbs (327 lbs on 10/05).   Telemetry: rates in 70s.   MAPs: 80-90.    Assessment & Plan  Acute on chronic diastolic congestive heart failure (HCC)  Wt Readings from Last 3 Encounters:   10/07/24 (!) 146 kg (321 lb 6.9 oz)   10/03/24 (!) 150 kg (331 lb 11.2 oz)   10/01/24 (!) 147 kg (324 lb)     -exam much improved. At dry weight. Will stop Bumex gtt now and start on oral regimen this PM. Will likely need standing doses of Metolazone 1-2 times weekly  -will have CardioMems pillow brought in from home so that a reading can be obtained while euvolemic  -please continue I/Os, daily standing weight, BMP      C 09/06/2022: no obstructive CAD.  TTE 04/11/2023: LVEF 50%. LVIDd 6.6 cm. Grade 2 DD. Normal RV. Trace MR and TR. Normal IVC.   RHC / MEMS calibration 10/11/2023: CardioMEMS data correlates to RHC.   TTE 06/20/2024: LVEF 55%. Normal RV. BRIAN (L>R). Normal IVC.      CardioMems in situ with PAD goal of 12-15 mmHg    Guideline Directed Medical Therapy:  --ARNi / ACEi / ARB: none.   --Aldosterone Antagonist:  Held.  --SGLT2 Inhibitor: empagliflozin 10 mg daily.      Volume Management:  --Home Diuretic: Restart Bumex 6 mg TID, Metolazone dosing TBD  --Inpatient Diuretic: IV Bumex 0.5 mg/hr.   --K supplementation: potassium  40 mEq BID.   Primary hypertension  -controlled on regimen above  Hyperlipidemia  Lab Results   Component Value Date    LDLCALC 86 10/06/2023   Rx Atorvastatin 10 mg daily    Morbid obesity (HCC)  -BMI 42  -weight loss encouraged.  VICKY (obstructive sleep apnea)  -without CPAP and equipment for >12 months. In process of getting new equipment.   Paroxysmal atrial fibrillation (HCC)  [id number] = 3 (HF, HTN, DM).  Anticoagulation on Eliquis.   Rate control: metoprolol succinate 50 mg daily.  Rhythm control: No.  Stage 3a chronic kidney disease (HCC)  Lab Results   Component Value Date    EGFR 37 10/07/2024    EGFR 40 10/06/2024    EGFR 40 10/05/2024    CREATININE 1.93 (H) 10/07/2024    CREATININE 1.82 (H) 10/06/2024    CREATININE 1.81 (H) 10/05/2024   -Acute on chronic  -Baseline creat around 1.5  -Creat up to 1.9 today.   Diabetic polyneuropathy associated with type 2 diabetes mellitus ([facility])  Lab Results   Component Value Date    HGBA1C 8.3 (H) 06/19/2024       Recent Labs     10/06/24  1137 10/06/24  1626 10/06/24  2231 10/07/24  0753   POCGLU 233* 183* 174* 171*       Blood Sugar Average: Last 72 hrs:  (P) 182.3655420697485662    Moderate persistent asthma without complication  -management per primary team      Objective :  Temp:  [97.4 °F (36.3 °C)-98.3 °F (36.8 °C)] 97.4 °F (36.3 °C)  HR:  [73-80] 80  BP: (110-126)/(63-79) 110/63  Resp:  [16-19] 16  SpO2:  [93 %-100 %] 99 %  O2 Device: None (Room air)  Orthostatic Blood Pressures      Flowsheet Row Most Recent Value   Blood Pressure 110/63 filed at 10/07/2024 0700   Patient Position - Orthostatic VS Lying filed at 10/07/2024 0700          First Weight: Weight - Scale: (!) 149 kg (328 lb) (10/04/24 2040)  Vitals:    10/06/24 0532 10/07/24 0700   Weight: (!) 146 kg (321 lb 6.9 oz) (!) 146 kg (321 lb 6.9 oz)     Physical Exam  Vitals and nursing note reviewed.   Constitutional:       Appearance: Normal appearance. He is obese.   HENT:      Head: Normocephalic and  atraumatic.      Nose: Congestion and rhinorrhea present.   Eyes:      Extraocular Movements: Extraocular movements intact.      Conjunctiva/sclera: Conjunctivae normal.   Cardiovascular:      Rate and Rhythm: Normal rate and regular rhythm.      Pulses: Normal pulses.      Heart sounds: Normal heart sounds.   Pulmonary:      Effort: Pulmonary effort is normal. No respiratory distress.      Breath sounds: Normal breath sounds. No stridor. No wheezing or rales.   Abdominal:      General: Abdomen is flat. Bowel sounds are normal. There is no distension.      Palpations: Abdomen is soft.   Musculoskeletal:         General: Normal range of motion.      Cervical back: Normal range of motion and neck supple.      Right lower leg: No edema.      Left lower leg: No edema.   Skin:     General: Skin is warm and dry.      Capillary Refill: Capillary refill takes 2 to 3 seconds.   Neurological:      General: No focal deficit present.      Mental Status: He is alert and oriented to person, place, and time.   Psychiatric:         Mood and Affect: Mood normal.         Behavior: Behavior normal.         Thought Content: Thought content normal.         Judgment: Judgment normal.           Lab Results: I have reviewed the following results:CBC/BMP:   .     10/07/24  0556   SODIUM 136   K 3.8   CL 96   CO2 25   BUN 39*   CREATININE 1.93*   GLUC 196*      Results from last 7 days   Lab Units 10/06/24  0431 10/05/24  0444 10/04/24  1400   WBC Thousand/uL 7.44 8.01 8.09   HEMOGLOBIN g/dL 12.1 11.2* 11.7*   HEMATOCRIT % 39.5 36.7 37.5   PLATELETS Thousands/uL 268 268 280     Results from last 7 days   Lab Units 10/07/24  0556 10/06/24  0431 10/05/24  0444   POTASSIUM mmol/L 3.8 4.3 3.3*   CHLORIDE mmol/L 96 99 93*   CO2 mmol/L 25 25 29   BUN mg/dL 39* 35* 31*   CREATININE mg/dL 1.93* 1.82* 1.81*   CALCIUM mg/dL 8.8 8.3* 8.2*         Lab Results   Component Value Date    HGBA1C 8.3 (H) 06/19/2024     Lab Results   Component Value Date     TROPONINI <0.02 09/22/2021         Other Study Results Review: EKG was reviewed.     VTE Pharmacologic Prophylaxis: VTE covered by:  apixaban, Oral, 5 mg at 10/07/24 0822     VTE Mechanical Prophylaxis: reason for no mechanical VTE prophylaxis patient refusing

## 2024-10-07 NOTE — PROGRESS NOTES
Progress Note - Hospitalist   Name: Mesfin Cano 59 y.o. male I MRN: [id number]  [facility] I Date of Admission: 10/4/2024   Date of Service: 10/7/2024 I Hospital Day: 3    Assessment & Plan  Acute on chronic diastolic congestive heart failure (HCC)  Wt Readings from Last 3 Encounters:   10/07/24 (!) 146 kg (321 lb 6.9 oz)   10/03/24 (!) 150 kg (331 lb 11.2 oz)   10/01/24 (!) 147 kg (324 lb)   This is a 59-year-old gentleman known to the cardiology and heart failure services for frequent exacerbations of his heart failure with preserved LVEF.  Seen in cardiology office earlier in the week and received IV Lasix 120 mg x 1 with poor urine output and weight gain above baseline approximately 16 pounds.  Dry weight 319.  Adherent to medications and lifestyle needs.  Home regimen: Bumex 6 mg 3 times daily with as needed metolazone, spironolactone  Last echocardiogram in our system appears to demonstrate EF 55% in 6/20/24 (diastolic function could not be assessed due to afib)  Bumex drip discontinued and transition to PTA Bumex 6 mg 3 times daily  May need standing metolazone dose for which patient may need longer monitoring  Continue Jardiance  Spironolactone held  Heart failure team consulted, appreciate recommendations  Monitor electrolytes and telemetry  Primary hypertension  Continue Toprol XL and monitor BP closely  Hyperlipidemia  Continue atorvastatin 10 mg daily  Morbid obesity (HCC)  Patient would strongly benefit from weight loss therapy  VICKY (obstructive sleep apnea)  Not currently qualified for CPAP therapy, recommend outpt followup  Paroxysmal atrial fibrillation (HCC)  Rate controlled on Toprol  Anticoagulated on eliquis  Monitor on telemetry  Stage 3a chronic kidney disease (HCC)  Lab Results   Component Value Date    EGFR 37 10/07/2024    EGFR 40 10/06/2024    EGFR 40 10/05/2024    CREATININE 1.93 (H) 10/07/2024    CREATININE 1.82 (H) 10/06/2024    CREATININE 1.81 (H) 10/05/2024     Renal  function appears baseline, monitor renal function and electrolytes closely while on diuretic infusion  Diabetic polyneuropathy associated with type 2 diabetes mellitus (HCC)  Lab Results   Component Value Date    HGBA1C 8.3 (H) 06/19/2024       Recent Labs     10/06/24  1626 10/06/24  2231 10/07/24  0753 10/07/24  1128   POCGLU 183* 174* 171* 185*       Blood Sugar Average: Last 72 hrs:  (P) [id number]    Hold home antihyperglycemics   Start Lantus 6 units at bedtime with mealtime correctional scale  Consistent carb diet  Moderate persistent asthma without complication  Continue albuterol, Symbicort, and umeclidinium    Onychomycosis  Appreciate podiatry assistance with nail clipping  Electrolyte abnormality  Potassium 3.8, stable  Continue potassium supplementation  Monitor   Iron deficiency  Ferritin 25, hemoglobin 12.1  IV Venofer 200 mg x 2 doses ordered  Vitamin B12 deficiency  Continue B12 supplementation    VTE Pharmacologic Prophylaxis:   Moderate Risk (Score 3-4) - Pharmacological DVT Prophylaxis Ordered: apixaban (Eliquis).    Mobility:   Basic Mobility Inpatient Raw Score: 22  JH-HLM Goal: 7: Walk 25 feet or more  JH-HLM Achieved: 7: Walk 25 feet or more  JH-HLM Goal achieved. Continue to encourage appropriate mobility.    Patient Centered Rounds: I performed bedside rounds with nursing staff today.   Discussions with Specialists or Other Care Team Provider: cardiology    Education and Discussions with Family / Patient: Patient declined call to .     Current Length of Stay: 3 day(s)  Current Patient Status: Inpatient   Certification Statement: The patient will continue to require additional inpatient hospital stay due to heart failure, IV diuretics transition to oral needing closer monitoring  Discharge Plan: Anticipate discharge in 24-48 hrs to home.    Code Status: Level 1 - Full Code    Subjective   Patient assessed at bedside.  Reports feeling better today.  Breathing is  slightly better.  Lower extremity and abdominal distention is slightly better.  Reports he is closer to dry weight.    Objective :  Temp:  [97.4 °F (36.3 °C)-98.3 °F (36.8 °C)] 97.9 °F (36.6 °C)  HR:  [73-80] 74  BP: (110-130)/(63-79) 130/73  Resp:  [16-18] 17  SpO2:  [93 %-100 %] 99 %  O2 Device: None (Room air)    Body mass index is 42.41 kg/m².     Input and Output Summary (last 24 hours):     Intake/Output Summary (Last 24 hours) at 10/7/2024 1230  Last data filed at 10/7/2024 0900  Gross per 24 hour   Intake 480 ml   Output 2675 ml   Net -2195 ml       Physical Exam  Vitals reviewed.   Constitutional:       General: He is not in acute distress.     Appearance: Normal appearance. He is not ill-appearing.   HENT:      Head: Normocephalic and atraumatic.   Cardiovascular:      Rate and Rhythm: Normal rate and regular rhythm.      Heart sounds: Normal heart sounds.   Pulmonary:      Effort: Pulmonary effort is normal. No respiratory distress.   Abdominal:      General: Bowel sounds are normal. There is distension.      Tenderness: There is no abdominal tenderness.   Musculoskeletal:      Right lower leg: Edema present.      Left lower leg: Edema present.   Neurological:      Mental Status: He is alert and oriented to person, place, and time. Mental status is at baseline.       Lines/Drains:        Telemetry:  Telemetry Orders (From admission, onward)               24 Hour Telemetry Monitoring  Continuous x 24 Hours (Telem)        Question:  Reason for 24 Hour Telemetry  Answer:  Decompensated CHF- and any one of the following: continuous diuretic infusion or total diuretic dose >200 mg daily, associated electrolyte derangement (I.e. K < 3.0), ionotropic drip (continuous infusion), hx of ventricular arrhythmia, or new EF < 35%                     Indication for Continued Telemetry Use: Acute CHF on >200 mg lasix/day or equivalent dose or with new reduced EF.                Lab Results: I have reviewed the following  results:   Results from last 7 days   Lab Units 10/06/24  0431   WBC Thousand/uL 7.44   HEMOGLOBIN g/dL 12.1   HEMATOCRIT % 39.5   PLATELETS Thousands/uL 268   SEGS PCT % 72   LYMPHO PCT % 14   MONO PCT % 6   EOS PCT % 6     Results from last 7 days   Lab Units 10/07/24  0556   SODIUM mmol/L 136   POTASSIUM mmol/L 3.8   CHLORIDE mmol/L 96   CO2 mmol/L 25   BUN mg/dL 39*   CREATININE mg/dL 1.93*   ANION GAP mmol/L 15*   CALCIUM mg/dL 8.8   GLUCOSE RANDOM mg/dL 196*         Results from last 7 days   Lab Units 10/07/24  1128 10/07/24  0753 10/06/24  2231 10/06/24  1626 10/06/24  1137 10/06/24  0801 10/05/24  2131 10/05/24  1651 10/05/24  1136 10/05/24  0801 10/04/24  2119 10/04/24  1623   POC GLUCOSE mg/dl 185* 171* 174* 183* 233* 166* 170* 183* 182* 166* 200* 178*               Recent Cultures (last 7 days):         Imaging Results Review: No pertinent imaging studies reviewed.  Other Study Results Review: No additional pertinent studies reviewed.    Last 24 Hours Medication List:     Current Facility-Administered Medications:     acetaminophen (TYLENOL) tablet 650 mg, Q6H PRN    albuterol (PROVENTIL HFA,VENTOLIN HFA) inhaler 2 puff, Q6H PRN    apixaban (ELIQUIS) tablet 5 mg, BID    atorvastatin (LIPITOR) tablet 10 mg, Daily    budesonide-formoterol (SYMBICORT) 160-4.5 mcg/act inhaler 2 puff, BID    bumetanide (BUMEX) tablet 6 mg, TID (diuretic)    cyanocobalamin injection 1,000 mcg, Q30 Days    Empagliflozin (JARDIANCE) tablet 10 mg, Daily    famotidine (PEPCID) tablet 20 mg, Daily    insulin glargine (LANTUS) subcutaneous injection 6 Units 0.06 mL, HS    insulin lispro (HumALOG/ADMELOG) 100 units/mL subcutaneous injection 2-12 Units, TID AC **AND** Fingerstick Glucose (POCT), TID AC    insulin lispro (HumALOG/ADMELOG) 100 units/mL subcutaneous injection 2-12 Units, HS    iron sucrose (VENOFER) 200 mg in sodium chloride 0.9 % 100 mL IVPB, Daily, Last Rate: 200 mg (10/07/24 1113)    metoprolol succinate (TOPROL-XL)  24 hr tablet 50 mg, Daily    nitroglycerin (NITROSTAT) SL tablet 0.4 mg, Q5 Min PRN    potassium chloride (Klor-Con M20) CR tablet 40 mEq, BID    pregabalin (LYRICA) capsule 50 mg, HS    umeclidinium 62.5 mcg/actuation inhaler AEPB 1 puff, Daily    Administrative Statements   Today, Patient Was Seen By: Gabby Espitia, DO  I have spent a total time of 25 minutes in caring for this patient on the day of the visit/encounter including Counseling / Coordination of care, Documenting in the medical record, Reviewing / ordering tests, medicine, procedures  , Obtaining or reviewing history  , and Communicating with other healthcare professionals .    **Please Note: This note may have been constructed using a voice recognition system.**

## 2024-10-07 NOTE — ASSESSMENT & PLAN NOTE
Wt Readings from Last 3 Encounters:   10/07/24 (!) 146 kg (321 lb 6.9 oz)   10/03/24 (!) 150 kg (331 lb 11.2 oz)   10/01/24 (!) 147 kg (324 lb)   This is a 59-year-old gentleman known to the cardiology and heart failure services for frequent exacerbations of his heart failure with preserved LVEF.  Seen in cardiology office earlier in the week and received IV Lasix 120 mg x 1 with poor urine output and weight gain above baseline approximately 16 pounds.  Dry weight 319.  Adherent to medications and lifestyle needs.  Home regimen: Bumex 6 mg 3 times daily with as needed metolazone, spironolactone  Last echocardiogram in our system appears to demonstrate EF 55% in 6/20/24 (diastolic function could not be assessed due to afib)  Bumex drip discontinued and transition to PTA Bumex 6 mg 3 times daily  May need standing metolazone dose for which patient may need longer monitoring  Continue Jardiance  Spironolactone held  Heart failure team consulted, appreciate recommendations  Monitor electrolytes and telemetry

## 2024-10-07 NOTE — ASSESSMENT & PLAN NOTE
Jessica Kelley is here for IV hydration due to dehydration    Is the patient receiving a medication or supportive treatment due to side effects or supportive care with a current Treatment Plan (e.g. Aredia, hydration, pamidronate, anti-nausea, GSCFs, etc)? Yes Regimen: Obintuzumab    Is this patient status post Stem Cell Transplant? No    ECOG:    ECOG [01/06/21 1104]   ECOG Performance Status 1       Nursing Assessment:  A comprehensive nursing assessment was performed and the patient reports the following:    See rooming nurses assessment    Vitals:  Orthostatic BP Reading:  Vitals:    01/06/21 1104   BP: 133/73   BP Location: LUE - Left upper extremity   Patient Position: Sitting   Cuff Size: Large Adult   Pulse: 72   Resp: 18   Temp: 98 °F (36.7 °C)   TempSrc: Oral   SpO2: 94%   Weight: 84.4 kg   PainSc:  0      Weight:  Wt Readings from Last 1 Encounters:   01/06/21 84.4 kg     Labs:  Sodium (mmol/L)   Date Value   01/06/2021 138   09/30/2019 140     Potassium (mmol/L)   Date Value   01/06/2021 3.9   09/30/2019 4.0     Chloride (mmol/L)   Date Value   01/06/2021 104   09/30/2019 105     Glucose (mg/dL)   Date Value   01/06/2021 244 (H)   09/30/2019 136 (H)     CALCIUM (mg/dL)   Date Value   09/30/2019 8.9     Calcium (mg/dL)   Date Value   01/06/2021 8.8     Carbon Dioxide (mmol/L)   Date Value   01/06/2021 26   09/30/2019 28     BUN (mg/dL)   Date Value   01/06/2021 29 (H)   09/30/2019 27 (H)     Creatinine (mg/dL)   Date Value   01/06/2021 1.26 (H)   09/30/2019 1.07 (H)     MAGNESIUM (mg/dL)   Date Value   08/02/2019 2.1     Magnesium (mg/dL)   Date Value   01/06/2021 1.9       Treatment:  Refer to LDA and MAR for line assessment and medication administration    Post Treatment: Treatment tolerated well; no adverse reaction    Does the Patient have a central line? yes:   Device: Port  Central Line Site: Right  and Chest  Central Line Site Appearance: WDL  Central line flushed with: 20 ml 0.9 normal saline  Lab Results   Component Value Date    EGFR 37 10/07/2024    EGFR 40 10/06/2024    EGFR 40 10/05/2024    CREATININE 1.93 (H) 10/07/2024    CREATININE 1.82 (H) 10/06/2024    CREATININE 1.81 (H) 10/05/2024   -Acute on chronic  -Baseline creat around 1.5  -Creat up to 1.9 today.    and 50 unit heparin.  Central line removed: no  Sam Needle: Sam needle de-accessed      Education: No new instructions.    Patient Discharged: patient discharged to home per self, ambulatory    Next appointment scheduled: 1/20/21  Patient instructed to call the office with any questions or concerns.  Dr Martinez is supervising clinician today.    MD LOS Below:  Check-out Comments: 1. IV fluids today 2. Continue Venetoclax 400 mg po daily 3. MD, lab (cbc, cmp, uric acid), possible fluids in 2 weeks   IVF given today see MAR.  Patient instructed to continue venetoclax and staff message sent to oral chemo RN's.  Patient scheduled for labs/md/tx for IVF on 1/20/21 labs @ 1050 md @ 1130 tx @ 1200. Lab orders entered by RN.

## 2024-10-07 NOTE — ASSESSMENT & PLAN NOTE
Lab Results   Component Value Date    HGBA1C 8.3 (H) 06/19/2024       Recent Labs     10/06/24  1137 10/06/24  1626 10/06/24  2231 10/07/24  0753   POCGLU 233* 183* 174* 171*       Blood Sugar Average: Last 72 hrs:  (P) 182.2101457621385176

## 2024-10-07 NOTE — ASSESSMENT & PLAN NOTE
Wt Readings from Last 3 Encounters:   10/07/24 (!) 146 kg (321 lb 6.9 oz)   10/03/24 (!) 150 kg (331 lb 11.2 oz)   10/01/24 (!) 147 kg (324 lb)     -exam much improved. At dry weight. Will stop Bumex gtt now and start on oral regimen this PM. Will likely need standing doses of Metolazone 1-2 times weekly  -will have CardioMems pillow brought in from home so that a reading can be obtained while euvolemic  -please continue I/Os, daily standing weight, BMP      LHC 09/06/2022: no obstructive CAD.  TTE 04/11/2023: LVEF 50%. LVIDd 6.6 cm. Grade 2 DD. Normal RV. Trace MR and TR. Normal IVC.   RHC / MEMS calibration 10/11/2023: CardioMEMS data correlates to RHC.   TTE 06/20/2024: LVEF 55%. Normal RV. BRIAN (L>R). Normal IVC.      CardioMems in situ with PAD goal of 12-15 mmHg    Guideline Directed Medical Therapy:  --ARNi / ACEi / ARB: none.   --Aldosterone Antagonist:  Held.  --SGLT2 Inhibitor: empagliflozin 10 mg daily.      Volume Management:  --Home Diuretic: Restart Bumex 6 mg TID, Metolazone dosing TBD  --Inpatient Diuretic: IV Bumex 0.5 mg/hr.   --K supplementation: potassium 40 mEq BID.

## 2024-10-08 LAB
ANION GAP SERPL CALCULATED.3IONS-SCNC: 12 MMOL/L (ref 4–13)
BASOPHILS # BLD AUTO: 0.04 THOUSANDS/ΜL (ref 0–0.1)
BASOPHILS NFR BLD AUTO: 1 % (ref 0–1)
BUN SERPL-MCNC: 41 MG/DL (ref 5–25)
CALCIUM SERPL-MCNC: 8.6 MG/DL (ref 8.4–10.2)
CHLORIDE SERPL-SCNC: 99 MMOL/L (ref 96–108)
CO2 SERPL-SCNC: 26 MMOL/L (ref 21–32)
CREAT SERPL-MCNC: 2.09 MG/DL (ref 0.6–1.3)
EOSINOPHIL # BLD AUTO: 0.49 THOUSAND/ΜL (ref 0–0.61)
EOSINOPHIL NFR BLD AUTO: 7 % (ref 0–6)
ERYTHROCYTE [DISTWIDTH] IN BLOOD BY AUTOMATED COUNT: 15.8 % (ref 11.6–15.1)
GFR SERPL CREATININE-BSD FRML MDRD: 34 ML/MIN/1.73SQ M
GLUCOSE SERPL-MCNC: 112 MG/DL (ref 65–140)
GLUCOSE SERPL-MCNC: 141 MG/DL (ref 65–140)
GLUCOSE SERPL-MCNC: 187 MG/DL (ref 65–140)
GLUCOSE SERPL-MCNC: 231 MG/DL (ref 65–140)
GLUCOSE SERPL-MCNC: 235 MG/DL (ref 65–140)
HCT VFR BLD AUTO: 36.3 % (ref 36.5–49.3)
HGB BLD-MCNC: 11.3 G/DL (ref 12–17)
IMM GRANULOCYTES # BLD AUTO: 0.05 THOUSAND/UL (ref 0–0.2)
IMM GRANULOCYTES NFR BLD AUTO: 1 % (ref 0–2)
LYMPHOCYTES # BLD AUTO: 1.18 THOUSANDS/ΜL (ref 0.6–4.47)
LYMPHOCYTES NFR BLD AUTO: 16 % (ref 14–44)
MAGNESIUM SERPL-MCNC: 2.5 MG/DL (ref 1.9–2.7)
MCH RBC QN AUTO: 27 PG (ref 26.8–34.3)
MCHC RBC AUTO-ENTMCNC: 31.1 G/DL (ref 31.4–37.4)
MCV RBC AUTO: 87 FL (ref 82–98)
MONOCYTES # BLD AUTO: 0.56 THOUSAND/ΜL (ref 0.17–1.22)
MONOCYTES NFR BLD AUTO: 8 % (ref 4–12)
NEUTROPHILS # BLD AUTO: 4.95 THOUSANDS/ΜL (ref 1.85–7.62)
NEUTS SEG NFR BLD AUTO: 67 % (ref 43–75)
NRBC BLD AUTO-RTO: 0 /100 WBCS
PLATELET # BLD AUTO: 278 THOUSANDS/UL (ref 149–390)
PMV BLD AUTO: 9.9 FL (ref 8.9–12.7)
POTASSIUM SERPL-SCNC: 3.8 MMOL/L (ref 3.5–5.3)
RBC # BLD AUTO: 4.18 MILLION/UL (ref 3.88–5.62)
SODIUM SERPL-SCNC: 137 MMOL/L (ref 135–147)
WBC # BLD AUTO: 7.27 THOUSAND/UL (ref 4.31–10.16)

## 2024-10-08 PROCEDURE — 85025 COMPLETE CBC W/AUTO DIFF WBC: CPT | Performed by: STUDENT IN AN ORGANIZED HEALTH CARE EDUCATION/TRAINING PROGRAM

## 2024-10-08 PROCEDURE — 99232 SBSQ HOSP IP/OBS MODERATE 35: CPT | Performed by: INTERNAL MEDICINE

## 2024-10-08 PROCEDURE — 80048 BASIC METABOLIC PNL TOTAL CA: CPT | Performed by: STUDENT IN AN ORGANIZED HEALTH CARE EDUCATION/TRAINING PROGRAM

## 2024-10-08 PROCEDURE — 82948 REAGENT STRIP/BLOOD GLUCOSE: CPT

## 2024-10-08 PROCEDURE — 83735 ASSAY OF MAGNESIUM: CPT | Performed by: STUDENT IN AN ORGANIZED HEALTH CARE EDUCATION/TRAINING PROGRAM

## 2024-10-08 PROCEDURE — 99232 SBSQ HOSP IP/OBS MODERATE 35: CPT | Performed by: NURSE PRACTITIONER

## 2024-10-08 RX ADMIN — INSULIN LISPRO 4 UNITS: 100 INJECTION, SOLUTION INTRAVENOUS; SUBCUTANEOUS at 12:07

## 2024-10-08 RX ADMIN — BUMETANIDE 6 MG: 2 TABLET ORAL at 08:09

## 2024-10-08 RX ADMIN — INSULIN LISPRO 2 UNITS: 100 INJECTION, SOLUTION INTRAVENOUS; SUBCUTANEOUS at 17:43

## 2024-10-08 RX ADMIN — IRON SUCROSE 200 MG: 20 INJECTION, SOLUTION INTRAVENOUS at 10:42

## 2024-10-08 RX ADMIN — FAMOTIDINE 20 MG: 20 TABLET, FILM COATED ORAL at 08:09

## 2024-10-08 RX ADMIN — INSULIN LISPRO 4 UNITS: 100 INJECTION, SOLUTION INTRAVENOUS; SUBCUTANEOUS at 21:21

## 2024-10-08 RX ADMIN — BUDESONIDE AND FORMOTEROL FUMARATE DIHYDRATE 2 PUFF: 160; 4.5 AEROSOL RESPIRATORY (INHALATION) at 17:43

## 2024-10-08 RX ADMIN — PREGABALIN 50 MG: 50 CAPSULE ORAL at 21:21

## 2024-10-08 RX ADMIN — ATORVASTATIN CALCIUM 10 MG: 10 TABLET, FILM COATED ORAL at 08:09

## 2024-10-08 RX ADMIN — INSULIN GLARGINE 6 UNITS: 100 INJECTION, SOLUTION SUBCUTANEOUS at 21:22

## 2024-10-08 RX ADMIN — POTASSIUM CHLORIDE 40 MEQ: 1500 TABLET, EXTENDED RELEASE ORAL at 17:40

## 2024-10-08 RX ADMIN — METOPROLOL SUCCINATE 50 MG: 50 TABLET, EXTENDED RELEASE ORAL at 08:09

## 2024-10-08 RX ADMIN — BUMETANIDE 6 MG: 2 TABLET ORAL at 12:17

## 2024-10-08 RX ADMIN — POTASSIUM CHLORIDE 40 MEQ: 1500 TABLET, EXTENDED RELEASE ORAL at 08:09

## 2024-10-08 RX ADMIN — APIXABAN 5 MG: 5 TABLET, FILM COATED ORAL at 08:09

## 2024-10-08 RX ADMIN — UMECLIDINIUM 1 PUFF: 62.5 AEROSOL, POWDER ORAL at 08:10

## 2024-10-08 RX ADMIN — BUDESONIDE AND FORMOTEROL FUMARATE DIHYDRATE 2 PUFF: 160; 4.5 AEROSOL RESPIRATORY (INHALATION) at 08:10

## 2024-10-08 RX ADMIN — BUMETANIDE 6 MG: 2 TABLET ORAL at 17:40

## 2024-10-08 RX ADMIN — APIXABAN 5 MG: 5 TABLET, FILM COATED ORAL at 17:40

## 2024-10-08 RX ADMIN — EMPAGLIFLOZIN 10 MG: 10 TABLET, FILM COATED ORAL at 08:10

## 2024-10-08 NOTE — ASSESSMENT & PLAN NOTE
Wt Readings from Last 3 Encounters:   10/08/24 (!) 147 kg (323 lb 3.1 oz)   10/03/24 (!) 150 kg (331 lb 11.2 oz)   10/01/24 (!) 147 kg (324 lb)     -exam much improved. Still within dry weight range but up 2lbs within last 24 hours. Creat up to 2 today. Will continue on oral Bumex 6 mg TID. Hold on Metolazone today but  will likely need standing doses 1-2 times per week. Will reassess labs, volume status in AM.  -will have CardioMems pillow brought in from home so that a reading can be obtained while euvolemic  -please continue I/Os, daily standing weight, BMP      LHC 09/06/2022: no obstructive CAD.  TTE 04/11/2023: LVEF 50%. LVIDd 6.6 cm. Grade 2 DD. Normal RV. Trace MR and TR. Normal IVC.   RHC / MEMS calibration 10/11/2023: CardioMEMS data correlates to RHC.   TTE 06/20/2024: LVEF 55%. Normal RV. BRIAN (L>R). Normal IVC.      CardioMems in situ with PAD goal of 12-15 mmHg    Guideline Directed Medical Therapy:  --ARNi / ACEi / ARB: none.   --Aldosterone Antagonist:  Held.  --SGLT2 Inhibitor: empagliflozin 10 mg daily.      Volume Management:  --Home Diuretic: Bumex 6 mg TID, Metolazone dosing TBD  --Inpatient Diuretic: IV Bumex 0.5 mg/hr.   --K supplementation: potassium 40 mEq BID.

## 2024-10-08 NOTE — PROGRESS NOTES
Progress Note - Heart Failure   Name: Mesfin Cano 59 y.o. male I MRN: [id number]  Unit/Bed#: [facility] 411-01 I Date of Admission: 10/4/2024   Date of Service: 10/8/2024 I Hospital Day: 4       Subjective:   Mesfin Cano is a 59-year-old man with PMH as below who presented to [facility] on 10/04/2024 with acute on chronic HF. Seen in cardiology office earlier this week and received 120 mg IV Lasix. Patient with weight gain the following day and lackluster urine output; advised to proceed to ED for admission.      Patient seen and examined. No significant events overnight.      Objective:   Intake/ Output: 540/2525/-2L  Weight: 323 lbs, up 2 lbs    Telemetry: rates in 70s.   MAPs: 80-90.  Assessment & Plan  Acute on chronic diastolic congestive heart failure (HCC)  Wt Readings from Last 3 Encounters:   10/08/24 (!) 147 kg (323 lb 3.1 oz)   10/03/24 (!) 150 kg (331 lb 11.2 oz)   10/01/24 (!) 147 kg (324 lb)     -exam much improved. Still within dry weight range but up 2lbs within last 24 hours. Creat up to 2 today. Will continue on oral Bumex 6 mg TID. Hold on Metolazone today but  will likely need standing doses 1-2 times per week. Will reassess labs, volume status in AM.  -will have CardioMems pillow brought in from home so that a reading can be obtained while euvolemic  -please continue I/Os, daily standing weight, BMP      LHC 09/06/2022: no obstructive CAD.  TTE 04/11/2023: LVEF 50%. LVIDd 6.6 cm. Grade 2 DD. Normal RV. Trace MR and TR. Normal IVC.   RHC / MEMS calibration 10/11/2023: CardioMEMS data correlates to RHC.   TTE 06/20/2024: LVEF 55%. Normal RV. BRIAN (L>R). Normal IVC.      CardioMems in situ with PAD goal of 12-15 mmHg    Guideline Directed Medical Therapy:  --ARNi / ACEi / ARB: none.   --Aldosterone Antagonist:  Held.  --SGLT2 Inhibitor: empagliflozin 10 mg daily.      Volume Management:  --Home Diuretic: Bumex 6 mg TID, Metolazone dosing TBD  --Inpatient Diuretic: IV Bumex 0.5 mg/hr.   --K  supplementation: potassium 40 mEq BID.   Primary hypertension  -controlled on regimen above  Hyperlipidemia  Lab Results   Component Value Date    LDLCALC 86 10/06/2023   Rx Atorvastatin 10 mg daily    Morbid obesity (HCC)  -BMI 42  -weight loss encouraged.  VICKY (obstructive sleep apnea)  -without CPAP and equipment for >12 months. In process of getting new equipment.   Paroxysmal atrial fibrillation (HCC)  [id number] = 3 (HF, HTN, DM).  Anticoagulation on Eliquis.   Rate control: metoprolol succinate 50 mg daily.  Rhythm control: No.  Stage 3a chronic kidney disease (HCC)  Lab Results   Component Value Date    EGFR 34 10/08/2024    EGFR 37 10/07/2024    EGFR 40 10/06/2024    CREATININE 2.09 (H) 10/08/2024    CREATININE 1.93 (H) 10/07/2024    CREATININE 1.82 (H) 10/06/2024   -Acute on chronic  -Baseline creat around 1.5  -Creat up to 2 today.   Diabetic polyneuropathy associated with type 2 diabetes mellitus ([facility])  Lab Results   Component Value Date    HGBA1C 8.3 (H) 06/19/2024       Recent Labs     10/07/24  1128 10/07/24  1643 10/07/24  2047 10/08/24  0740   POCGLU 185* 180* 211* 112       Blood Sugar Average: Last 72 hrs:  (P) 178.0575891640325950    Moderate persistent asthma without complication  -management per primary team      Objective :  Temp:  [97.3 °F (36.3 °C)-97.9 °F (36.6 °C)] 97.3 °F (36.3 °C)  HR:  [2-92] 2  BP: ()/(54-75) 131/75  Resp:  [16-19] 17  SpO2:  [98 %-100 %] 99 %  O2 Device: None (Room air)  Orthostatic Blood Pressures      Flowsheet Row Most Recent Value   Blood Pressure 131/75 filed at 10/08/2024 1130   Patient Position - Orthostatic VS Lying filed at 10/08/2024 1130          First Weight: Weight - Scale: (!) 149 kg (328 lb) (10/04/24 2040)  Vitals:    10/08/24 0600 10/08/24 0702   Weight: (!) 147 kg (323 lb 3.1 oz) (!) 147 kg (323 lb 3.1 oz)     Physical Exam  Vitals and nursing note reviewed.   Constitutional:       General: He is not in acute distress.     Appearance: Normal  appearance. He is obese.   HENT:      Head: Normocephalic and atraumatic.   Cardiovascular:      Rate and Rhythm: Normal rate and regular rhythm.      Pulses: Normal pulses.      Heart sounds: Normal heart sounds.   Pulmonary:      Effort: Pulmonary effort is normal.      Breath sounds: Normal breath sounds.   Abdominal:      General: Bowel sounds are normal.      Palpations: Abdomen is soft.   Musculoskeletal:         General: Normal range of motion.      Cervical back: Normal range of motion and neck supple.      Right lower leg: No edema.      Left lower leg: No edema.   Skin:     General: Skin is warm and dry.      Capillary Refill: Capillary refill takes 2 to 3 seconds.   Neurological:      General: No focal deficit present.      Mental Status: He is alert and oriented to person, place, and time.   Psychiatric:         Mood and Affect: Mood normal.         Behavior: Behavior normal.         Thought Content: Thought content normal.         Judgment: Judgment normal.           Lab Results: I have reviewed the following results:CBC/BMP:   .     10/08/24  0659   WBC 7.27   HGB 11.3*   HCT 36.3*      SODIUM 137   K 3.8   CL 99   CO2 26   BUN 41*   CREATININE 2.09*   GLUC 141*   MG 2.5      Results from last 7 days   Lab Units 10/08/24  0659 10/06/24  0431 10/05/24  0444   WBC Thousand/uL 7.27 7.44 8.01   HEMOGLOBIN g/dL 11.3* 12.1 11.2*   HEMATOCRIT % 36.3* 39.5 36.7   PLATELETS Thousands/uL 278 268 268     Results from last 7 days   Lab Units 10/08/24  0659 10/07/24  0556 10/06/24  0431   POTASSIUM mmol/L 3.8 3.8 4.3   CHLORIDE mmol/L 99 96 99   CO2 mmol/L 26 25 25   BUN mg/dL 41* 39* 35*   CREATININE mg/dL 2.09* 1.93* 1.82*   CALCIUM mg/dL 8.6 8.8 8.3*         Lab Results   Component Value Date    HGBA1C 8.3 (H) 06/19/2024     Lab Results   Component Value Date    TROPONINI <0.02 09/22/2021         Other Study Results Review: EKG was reviewed.     VTE Pharmacologic Prophylaxis: Sequential compression device  (Venodyne)   VTE Mechanical Prophylaxis: reason for no mechanical VTE prophylaxis on systemic AC

## 2024-10-08 NOTE — ASSESSMENT & PLAN NOTE
Ferritin 25, hemoglobin 12.1  IV Venofer 200 mg x 2 doses ordered  Will need outpatient colonoscopy and endoscopy, will place a referral

## 2024-10-08 NOTE — PLAN OF CARE
Problem: PAIN - ADULT  Goal: Verbalizes/displays adequate comfort level or baseline comfort level  Description: Interventions:  - Encourage patient to monitor pain and request assistance  - Assess pain using appropriate pain scale  - Administer analgesics based on type and severity of pain and evaluate response  - Implement non-pharmacological measures as appropriate and evaluate response  - Consider cultural and social influences on pain and pain management  - Notify physician/advanced practitioner if interventions unsuccessful or patient reports new pain  Outcome: Progressing     Problem: CARDIOVASCULAR - ADULT  Goal: Maintains optimal cardiac output and hemodynamic stability  Description: INTERVENTIONS:  - Monitor I/O, vital signs and rhythm  - Monitor for S/S and trends of decreased cardiac output  - Administer and titrate ordered vasoactive medications to optimize hemodynamic stability  - Assess quality of pulses, skin color and temperature  - Assess for signs of decreased coronary artery perfusion  - Instruct patient to report change in severity of symptoms  Outcome: Progressing     Problem: METABOLIC, FLUID AND ELECTROLYTES - ADULT  Goal: Electrolytes maintained within normal limits  Description: INTERVENTIONS:  - Monitor labs and assess patient for signs and symptoms of electrolyte imbalances  - Administer electrolyte replacement as ordered  - Monitor response to electrolyte replacements, including repeat lab results as appropriate  - Instruct patient on fluid and nutrition as appropriate  Outcome: Progressing

## 2024-10-08 NOTE — ASSESSMENT & PLAN NOTE
Wt Readings from Last 3 Encounters:   10/08/24 (!) 147 kg (323 lb 3.1 oz)   10/03/24 (!) 150 kg (331 lb 11.2 oz)   10/01/24 (!) 147 kg (324 lb)   This is a 59-year-old gentleman known to the cardiology and heart failure services for frequent exacerbations of his heart failure with preserved LVEF.  Seen in cardiology office earlier in the week and received IV Lasix 120 mg x 1 with poor urine output and weight gain above baseline approximately 16 pounds.  Dry weight 319.  Adherent to medications and lifestyle needs.  Home regimen: Bumex 6 mg 3 times daily with as needed metolazone, spironolactone  Last echocardiogram in our system appears to demonstrate EF 55% in 6/20/24 (diastolic function could not be assessed due to afib)  Bumex drip discontinued and transition to PTA Bumex 6 mg 3 times daily  May need standing metolazone dose for which patient may need longer monitoring  Continue Jardiance  Spironolactone held  Heart failure team consulted, appreciate recommendations  Monitor electrolytes and telemetry  Awaiting cariomems reading tomorrow AM  Monitor weights for stability on his current oral regimen

## 2024-10-08 NOTE — PROGRESS NOTES
Progress Note - Hospitalist   Name: Mesfin Cano 59 y.o. male I MRN: [id number]  [facility] Date of Admission: 10/4/2024   Date of Service: 10/8/2024 I Hospital Day: 4    Assessment & Plan  Acute on chronic diastolic congestive heart failure (HCC)  Wt Readings from Last 3 Encounters:   10/08/24 (!) 147 kg (323 lb 3.1 oz)   10/03/24 (!) 150 kg (331 lb 11.2 oz)   10/01/24 (!) 147 kg (324 lb)   This is a 59-year-old gentleman known to the cardiology and heart failure services for frequent exacerbations of his heart failure with preserved LVEF.  Seen in cardiology office earlier in the week and received IV Lasix 120 mg x 1 with poor urine output and weight gain above baseline approximately 16 pounds.  Dry weight 319.  Adherent to medications and lifestyle needs.  Home regimen: Bumex 6 mg 3 times daily with as needed metolazone, spironolactone  Last echocardiogram in our system appears to demonstrate EF 55% in 6/20/24 (diastolic function could not be assessed due to afib)  Bumex drip discontinued and transition to PTA Bumex 6 mg 3 times daily  May need standing metolazone dose for which patient may need longer monitoring  Continue Jardiance  Spironolactone held  Heart failure team consulted, appreciate recommendations  Monitor electrolytes and telemetry  Awaiting cariomems reading tomorrow AM  Monitor weights for stability on his current oral regimen  Primary hypertension  Continue Toprol XL and monitor BP closely  Hyperlipidemia  Continue atorvastatin 10 mg daily  Morbid obesity (HCC)  Patient would strongly benefit from weight loss therapy  VICKY (obstructive sleep apnea)  Not currently qualified for CPAP therapy, recommend outpt followup  Paroxysmal atrial fibrillation (HCC)  Rate controlled on Toprol  Anticoagulated on eliquis  Monitor on telemetry  Stage 3a chronic kidney disease (HCC)  Lab Results   Component Value Date    EGFR 34 10/08/2024    EGFR 37 10/07/2024    EGFR 40 10/06/2024    CREATININE 2.09  (H) 10/08/2024    CREATININE 1.93 (H) 10/07/2024    CREATININE 1.82 (H) 10/06/2024     Renal function appears baseline, monitor renal function and electrolytes closely while on diuretic infusion  Diabetic polyneuropathy associated with type 2 diabetes mellitus (HCC)  Lab Results   Component Value Date    HGBA1C 8.3 (H) 06/19/2024       Recent Labs     10/07/24  1643 10/07/24  2047 10/08/24  0740 10/08/24  1153   POCGLU 180* 211* 112 231*       Blood Sugar Average: Last 72 hrs:  (P) [id number]    Hold home antihyperglycemics   Start Lantus 6 units at bedtime with mealtime correctional scale  Consistent carb diet  Moderate persistent asthma without complication  Continue albuterol, Symbicort, and umeclidinium    Onychomycosis  Appreciate podiatry assistance with nail clipping  Electrolyte abnormality  Potassium 3.8, stable  Continue potassium supplementation  Monitor   Iron deficiency  Ferritin 25, hemoglobin 12.1  IV Venofer 200 mg x 2 doses ordered  Will need outpatient colonoscopy and endoscopy, will place a referral  Vitamin B12 deficiency  Continue B12 supplementation    VTE Pharmacologic Prophylaxis: VTE Score: 3 Moderate Risk (Score 3-4) - Pharmacological DVT Prophylaxis Ordered: heparin.    Mobility:   Basic Mobility Inpatient Raw Score: 22  JH-HLM Goal: 7: Walk 25 feet or more  JH-HLM Achieved: 7: Walk 25 feet or more  JH-HLM Goal achieved. Continue to encourage appropriate mobility.    Patient Centered Rounds: I performed bedside rounds with nursing staff today.   Discussions with Specialists or Other Care Team Provider: nurse, CM, cardiology      Current Length of Stay: 4 day(s)  Current Patient Status: Inpatient   Certification Statement: The patient will continue to require additional inpatient hospital stay due to monitor for stability on current HS regimen  Discharge Plan: Anticipate discharge in 24-48 hrs to home.    Code Status: Level 1 - Full Code    Subjective   Denies any new complaints,  breathing is improved., edema is improved    Objective :  Temp:  [97.3 °F (36.3 °C)-97.9 °F (36.6 °C)] 97.3 °F (36.3 °C)  HR:  [2-92] 2  BP: ()/(54-75) 131/75  Resp:  [16-19] 17  SpO2:  [98 %-100 %] 99 %  O2 Device: None (Room air)    Body mass index is 42.64 kg/m².     Input and Output Summary (last 24 hours):     Intake/Output Summary (Last 24 hours) at 10/8/2024 1236  Last data filed at 10/8/2024 1042  Gross per 24 hour   Intake 540 ml   Output 3775 ml   Net -3235 ml       Physical Exam  Constitutional:       Appearance: Normal appearance.   HENT:      Head: Normocephalic and atraumatic.      Nose: Nose normal.   Eyes:      Extraocular Movements: Extraocular movements intact.   Cardiovascular:      Rate and Rhythm: Normal rate and regular rhythm.   Pulmonary:      Effort: Pulmonary effort is normal.      Breath sounds: No wheezing or rales.   Musculoskeletal:      Right lower leg: No edema.      Left lower leg: No edema.   Skin:     General: Skin is warm and dry.   Neurological:      General: No focal deficit present.      Mental Status: He is alert and oriented to person, place, and time.   Psychiatric:         Mood and Affect: Mood normal.         Behavior: Behavior normal.           Lines/Drains:        Telemetry:  Telemetry Orders (From admission, onward)               24 Hour Telemetry Monitoring  Continuous x 24 Hours (Telem)        Question:  Reason for 24 Hour Telemetry  Answer:  Decompensated CHF- and any one of the following: continuous diuretic infusion or total diuretic dose >200 mg daily, associated electrolyte derangement (I.e. K < 3.0), ionotropic drip (continuous infusion), hx of ventricular arrhythmia, or new EF < 35%                     Telemetry Reviewed: Normal Sinus Rhythm  Indication for Continued Telemetry Use: Acute CHF on >200 mg lasix/day or equivalent dose or with new reduced EF.                Lab Results: I have reviewed the following results:   Results from last 7 days   Lab  Units 10/08/24  0659   WBC Thousand/uL 7.27   HEMOGLOBIN g/dL 11.3*   HEMATOCRIT % 36.3*   PLATELETS Thousands/uL 278   SEGS PCT % 67   LYMPHO PCT % 16   MONO PCT % 8   EOS PCT % 7*     Results from last 7 days   Lab Units 10/08/24  0659   SODIUM mmol/L 137   POTASSIUM mmol/L 3.8   CHLORIDE mmol/L 99   CO2 mmol/L 26   BUN mg/dL 41*   CREATININE mg/dL 2.09*   ANION GAP mmol/L 12   CALCIUM mg/dL 8.6   GLUCOSE RANDOM mg/dL 141*         Results from last 7 days   Lab Units 10/08/24  1153 10/08/24  0740 10/07/24  2047 10/07/24  1643 10/07/24  1128 10/07/24  0753 10/06/24  2231 10/06/24  1626 10/06/24  1137 10/06/24  0801 10/05/24  2131 10/05/24  1651   POC GLUCOSE mg/dl 231* 112 211* 180* 185* 171* 174* 183* 233* 166* 170* 183*               Recent Cultures (last 7 days):         Imaging Results Review: No pertinent imaging studies reviewed.  Other Study Results Review: No additional pertinent studies reviewed.    Last 24 Hours Medication List:     Current Facility-Administered Medications:     acetaminophen (TYLENOL) tablet 650 mg, Q6H PRN    albuterol (PROVENTIL HFA,VENTOLIN HFA) inhaler 2 puff, Q6H PRN    apixaban (ELIQUIS) tablet 5 mg, BID    atorvastatin (LIPITOR) tablet 10 mg, Daily    budesonide-formoterol (SYMBICORT) 160-4.5 mcg/act inhaler 2 puff, BID    bumetanide (BUMEX) tablet 6 mg, TID (diuretic)    cyanocobalamin injection 1,000 mcg, Q30 Days    Empagliflozin (JARDIANCE) tablet 10 mg, Daily    famotidine (PEPCID) tablet 20 mg, Daily    insulin glargine (LANTUS) subcutaneous injection 6 Units 0.06 mL, HS    insulin lispro (HumALOG/ADMELOG) 100 units/mL subcutaneous injection 2-12 Units, TID AC **AND** Fingerstick Glucose (POCT), TID AC    insulin lispro (HumALOG/ADMELOG) 100 units/mL subcutaneous injection 2-12 Units, HS    metoprolol succinate (TOPROL-XL) 24 hr tablet 50 mg, Daily    nitroglycerin (NITROSTAT) SL tablet 0.4 mg, Q5 Min PRN    potassium chloride (Klor-Con M20) CR tablet 40 mEq, BID     pregabalin (LYRICA) capsule 50 mg, HS    umeclidinium 62.5 mcg/actuation inhaler AEPB 1 puff, Daily    Administrative Statements   Today, Patient Was Seen By: Travis Champion MD  I have spent a total time of 35 minutes in caring for this patient on the day of the visit/encounter including Instructions for management, Patient and family education, Impressions, Counseling / Coordination of care, Documenting in the medical record, Reviewing / ordering tests, medicine, procedures  , and Communicating with other healthcare professionals .    **Please Note: This note may have been constructed using a voice recognition system.**

## 2024-10-08 NOTE — ASSESSMENT & PLAN NOTE
[id number] = 3 (HF, HTN, DM).  Anticoagulation on Eliquis.   Rate control: metoprolol succinate 50 mg daily.  Rhythm control: No.

## 2024-10-08 NOTE — ASSESSMENT & PLAN NOTE
Lab Results   Component Value Date    EGFR 34 10/08/2024    EGFR 37 10/07/2024    EGFR 40 10/06/2024    CREATININE 2.09 (H) 10/08/2024    CREATININE 1.93 (H) 10/07/2024    CREATININE 1.82 (H) 10/06/2024   -Acute on chronic  -Baseline creat around 1.5  -Creat up to 2 today.

## 2024-10-08 NOTE — ASSESSMENT & PLAN NOTE
Lab Results   Component Value Date    EGFR 34 10/08/2024    EGFR 37 10/07/2024    EGFR 40 10/06/2024    CREATININE 2.09 (H) 10/08/2024    CREATININE 1.93 (H) 10/07/2024    CREATININE 1.82 (H) 10/06/2024     Renal function appears baseline, monitor renal function and electrolytes closely while on diuretic infusion

## 2024-10-08 NOTE — ASSESSMENT & PLAN NOTE
Lab Results   Component Value Date    HGBA1C 8.3 (H) 06/19/2024       Recent Labs     10/07/24  1643 10/07/24  2047 10/08/24  0740 10/08/24  1153   POCGLU 180* 211* 112 231*       Blood Sugar Average: Last 72 hrs:  (P) [id number]    Hold home antihyperglycemics   Start Lantus 6 units at bedtime with mealtime correctional scale  Consistent carb diet

## 2024-10-08 NOTE — UTILIZATION REVIEW
Continued Stay Review    Date: 10/08/24                          Current Patient Class: Inpatient  Current Level of Care: Med/Surg    HPI:59 y.o. male initially admitted on 10/4 fro CHF.     Assessment/Plan: Reports breathing and edema are both improved. IV bumex gtt discontinued yesterday. Exam: weight up to 323.19 lb from 321.43 lb yesterday. B/L LE swelling. Telemetry NSR. Crt 2.09. Plan: continue PO bumex 6 mg TID, hold metolazone today - but will need 1-2 doses per week. DW, I&O, Trend labs, replete electrolytes as needed. Telemetry. Pending CardioMems reading tomorrow morning. Continue current meds. SSI w/ BG checks ACHS, Lantus. IV venofer.       Medications:   Scheduled Medications:  apixaban, 5 mg, Oral, BID  atorvastatin, 10 mg, Oral, Daily  budesonide-formoterol, 2 puff, Inhalation, BID  bumetanide, 6 mg, Oral, TID (diuretic)  cyanocobalamin, 1,000 mcg, Intramuscular, Q30 Days  Empagliflozin, 10 mg, Oral, Daily  famotidine, 20 mg, Oral, Daily  insulin glargine, 6 Units, Subcutaneous, HS  insulin lispro, 2-12 Units, Subcutaneous, TID AC  insulin lispro, 2-12 Units, Subcutaneous, HS  metoprolol succinate, 50 mg, Oral, Daily  potassium chloride, 40 mEq, Oral, BID  pregabalin, 50 mg, Oral, HS  umeclidinium, 1 puff, Inhalation, Daily    Continuous IV Infusions:  bumetanide (BUMEX) 12.5 mg infusion 50 mL  Rate: 2 mL/hr Dose: 0.5 mg/hr  Freq: Continuous Route: IV  Last Dose: Stopped (10/07/24 1125)  Start: 10/04/24 1600 End: 10/07/24 1123    PRN Meds:  acetaminophen, 650 mg, Oral, Q6H PRN  albuterol, 2 puff, Inhalation, Q6H PRN  nitroglycerin, 0.4 mg, Sublingual, Q5 Min PRN      iron sucrose (VENOFER) 200 mg in sodium chloride 0.9 % 100 mL IVPB  Dose: 200 mg  Freq: Daily Route: IV  Last Dose: Stopped (10/08/24 1218)  Start: 10/07/24 1100 End: 10/08/24 1218       Discharge Plan: TBD    Vital Signs (last 3 days)       Date/Time Temp Pulse Resp BP MAP (mmHg) SpO2 O2 Device Pain    10/08/24 1130 97.3 °F (36.3 °C)   -- 17 131/75 96 99 % None (Room air) --    10/08/24 0800 -- -- -- -- -- 100 % None (Room air) No Pain    10/08/24 0702 97.3 °F (36.3 °C) 92 16 126/72 -- 100 % None (Room air) No Pain    10/08/24 0303 97.3 °F (36.3 °C) 70 18 98/54 -- 98 % None (Room air) No Pain    10/07/24 2300 97.6 °F (36.4 °C) 79 -- 122/68 89 98 % None (Room air) --    10/07/24 2200 -- -- -- -- -- -- -- No Pain    10/07/24 1900 97.4 °F (36.3 °C) 74 18 121/69 88 98 % None (Room air) --    10/07/24 1500 97.9 °F (36.6 °C) 66 19 104/58 74 98 % None (Room air) --    10/07/24 1100 97.9 °F (36.6 °C) 74 17 130/73 95 99 % None (Room air) --    10/07/24 0800 -- -- -- -- -- 99 % None (Room air) No Pain    10/07/24 0700 97.4 °F (36.3 °C) 80 16 110/63 -- 99 % None (Room air) --    10/07/24 0300 97.4 °F (36.3 °C) 73 16 118/72 90 93 % None (Room air) --    10/06/24 2300 98.3 °F (36.8 °C) 79 18 121/79 -- 100 % -- --    10/06/24 1900 97.8 °F (36.6 °C) 79 -- 119/73 91 99 % None (Room air) --    10/06/24 1500 97.9 °F (36.6 °C) 75 -- 117/63 87 96 % None (Room air) --    10/06/24 1159 97.9 °F (36.6 °C) 79 19 126/65 90 -- None (Room air) --    10/06/24 0831 -- -- -- -- -- 100 % None (Room air) No Pain    10/06/24 0700 97.6 °F (36.4 °C) 74 20 133/65 91 -- None (Room air) --    10/06/24 0319 98.2 °F (36.8 °C) 72 20 122/56 80 100 % None (Room air) --    10/05/24 2300 98.1 °F (36.7 °C) 70 -- 115/66 85 99 % -- --    10/05/24 2000 -- -- -- -- -- -- -- No Pain    10/05/24 1923 97.8 °F (36.6 °C) 77 -- 116/73 93 100 % None (Room air) --    10/05/24 1500 97.7 °F (36.5 °C) 74 -- 119/69 87 97 % None (Room air) --    10/05/24 1100 97.8 °F (36.6 °C) 70 18 111/71 87 -- None (Room air) --    10/05/24 0940 -- -- -- -- -- -- -- No Pain    10/05/24 0700 98 °F (36.7 °C) 76 20 144/79 106 -- None (Room air) --    10/05/24 0233 98.4 °F (36.9 °C) 105 18 125/68 83 99 % None (Room air) --          Weight (last 2 days)       Date/Time Weight    10/08/24 0702 147 (323.19)    10/08/24 0600 147  (323.19)    10/07/24 0700 146 (321.43)    10/07/24 0600 146 (321.43)    10/06/24 0532 146 (321.43)    10/06/24 0450 146 (321.4)            Pertinent Labs/Diagnostic Results:   Radiology:  XR chest portable   Final Interpretation by Carrie Marie MD (10/04 1531)      No acute cardiopulmonary disease.            Workstation performed: [id number]             Results from last 7 days   Lab Units 10/08/24  0659 10/06/24  0431 10/05/24  0444 10/04/24  1400   WBC Thousand/uL 7.27 7.44 8.01 8.09   HEMOGLOBIN g/dL 11.3* 12.1 11.2* 11.7*   HEMATOCRIT % 36.3* 39.5 36.7 37.5   PLATELETS Thousands/uL 278 268 268 280   TOTAL NEUT ABS Thousands/µL 4.95 5.36  --  6.35         Results from last 7 days   Lab Units 10/08/24  0659 10/07/24  0556 10/06/24  0431 10/05/24  0444 10/04/24  2057   SODIUM mmol/L 137 136 137 135 134*   POTASSIUM mmol/L 3.8 3.8 4.3 3.3* 4.1   CHLORIDE mmol/L 99 96 99 93* 95*   CO2 mmol/L 26 25 25 29 25   ANION GAP mmol/L 12 15* 13 13 14*   BUN mg/dL 41* 39* 35* 31* 30*   CREATININE mg/dL 2.09* 1.93* 1.82* 1.81* 1.55*   EGFR ml/min/1.73sq m 34 37 40 40 48   CALCIUM mg/dL 8.6 8.8 8.3* 8.2* 8.0*   MAGNESIUM mg/dL 2.5  --  2.2 2.0  --    PHOSPHORUS mg/dL  --   --   --  3.9  --          Results from last 7 days   Lab Units 10/08/24  1153 10/08/24  0740 10/07/24  2047 10/07/24  1643 10/07/24  1128 10/07/24  0753 10/06/24  2231 10/06/24  1626 10/06/24  1137 10/06/24  0801 10/05/24  2131 10/05/24  1651   POC GLUCOSE mg/dl 231* 112 211* 180* 185* 171* 174* 183* 233* 166* 170* 183*     Results from last 7 days   Lab Units 10/08/24  0659 10/07/24  0556 10/06/24  0431 10/05/24  0444 10/04/24  2057 10/04/24  1400   GLUCOSE RANDOM mg/dL 141* 196* 152* 151* 186* 211*      Results from last 7 days   Lab Units 10/04/24  1900 10/04/24  1617 10/04/24  1400   HS TNI 0HR ng/L  --   --  15   HS TNI 2HR ng/L  --  14  --    HSTNI D2 ng/L  --  -1  --    HS TNI 4HR ng/L 15  --   --    HSTNI D4 ng/L 0  --   --      Results from  last 7 days   Lab Units 10/04/24  1400   BNP pg/mL 470*     Results from last 7 days   Lab Units 10/07/24  0556   FERRITIN ng/mL 25   IRON SATURATION % 10*   IRON ug/dL 50   TIBC ug/dL 485*         Network Utilization Review Department  ATTENTION: Please call with any questions or concerns to [phone] and carefully listen to the prompts so that you are directed to the right person. All voicemails are confidential.   For Discharge needs, contact Care Management DC Support Team at [phone] opt. [phone]  Send all requests for admission clinical reviews, approved or denied determinations and any other requests to dedicated fax number below belonging to the campus where the patient is receiving treatment. List of dedicated fax numbers for the Facilities:  FACILITY NAME UR FAX NUMBER   ADMISSION DENIALS (Administrative/Medical Necessity) [phone]   DISCHARGE SUPPORT TEAM (NETWORK) [phone]   PARENT CHILD HEALTH (Maternity/NICU/Pediatrics) [phone]   [facility] [phone]   [facility] [phone]   [facility] [phone]   [facility] [phone]   [facility] [phone]   [facility] [phone]   [facility] [phone]   [facility] [phone]   [facility] [phone]   [facility] [phone]   [facility] [phone]   [facility] [phone]

## 2024-10-08 NOTE — ASSESSMENT & PLAN NOTE
February 21, 2017      Slime Kim MD  [address] [facility] [address]  [place] [address]           [facility] - Rheumatology  [address] [place] [address]  Phone: [phone]  Fax: [phone]          Patient: Esperanza Rubalcava   MR Number: [id number]   YOB: 1931   Date of Visit: 2/21/2017       Dear Dr. Slime Kim:    Thank you for referring Esperanza Rubalcava to me for evaluation. Attached you will find relevant portions of my assessment and plan of care.    If you have questions, please do not hesitate to call me. I look forward to following Esperanza Rubalcava along with you.    Sincerely,    Ivana Neff MD    Enclosure  CC:  No Recipients    If you would like to receive this communication electronically, please contact [email or web address] or [phone] to request more information on [facility] access.    For providers and/or their staff who would like to refer a patient to [facility], please contact us through our one-stop-shop provider referral line, [facility], at [phone].    If you feel you have received this communication in error or would no longer like to receive these types of communications, please e-mail [email or web address]          Lab Results   Component Value Date    HGBA1C 8.3 (H) 06/19/2024       Recent Labs     10/07/24  1128 10/07/24  1643 10/07/24  2047 10/08/24  0740   POCGLU 185* 180* 211* 112       Blood Sugar Average: Last 72 hrs:  (P) 178.3832724560618241

## 2024-10-09 ENCOUNTER — APPOINTMENT (INPATIENT)
Dept: RADIOLOGY | Facility: HOSPITAL | Age: 59
DRG: 291 | End: 2024-10-09
Payer: COMMERCIAL

## 2024-10-09 LAB
ANION GAP SERPL CALCULATED.3IONS-SCNC: 11 MMOL/L (ref 4–13)
BUN SERPL-MCNC: 41 MG/DL (ref 5–25)
CALCIUM SERPL-MCNC: 8.5 MG/DL (ref 8.4–10.2)
CHLORIDE SERPL-SCNC: 99 MMOL/L (ref 96–108)
CO2 SERPL-SCNC: 26 MMOL/L (ref 21–32)
CREAT SERPL-MCNC: 1.99 MG/DL (ref 0.6–1.3)
ERYTHROCYTE [DISTWIDTH] IN BLOOD BY AUTOMATED COUNT: 15.9 % (ref 11.6–15.1)
GFR SERPL CREATININE-BSD FRML MDRD: 36 ML/MIN/1.73SQ M
GLUCOSE SERPL-MCNC: 158 MG/DL (ref 65–140)
GLUCOSE SERPL-MCNC: 162 MG/DL (ref 65–140)
GLUCOSE SERPL-MCNC: 178 MG/DL (ref 65–140)
GLUCOSE SERPL-MCNC: 197 MG/DL (ref 65–140)
GLUCOSE SERPL-MCNC: 240 MG/DL (ref 65–140)
HCT VFR BLD AUTO: 39.5 % (ref 36.5–49.3)
HGB BLD-MCNC: 12.1 G/DL (ref 12–17)
MAGNESIUM SERPL-MCNC: 2.6 MG/DL (ref 1.9–2.7)
MCH RBC QN AUTO: 27.1 PG (ref 26.8–34.3)
MCHC RBC AUTO-ENTMCNC: 30.6 G/DL (ref 31.4–37.4)
MCV RBC AUTO: 89 FL (ref 82–98)
PHOSPHATE SERPL-MCNC: 4 MG/DL (ref 2.7–4.5)
PLATELET # BLD AUTO: 287 THOUSANDS/UL (ref 149–390)
PMV BLD AUTO: 10.3 FL (ref 8.9–12.7)
POTASSIUM SERPL-SCNC: 3.4 MMOL/L (ref 3.5–5.3)
RBC # BLD AUTO: 4.46 MILLION/UL (ref 3.88–5.62)
SODIUM SERPL-SCNC: 136 MMOL/L (ref 135–147)
WBC # BLD AUTO: 8.73 THOUSAND/UL (ref 4.31–10.16)

## 2024-10-09 PROCEDURE — 76705 ECHO EXAM OF ABDOMEN: CPT

## 2024-10-09 PROCEDURE — 83735 ASSAY OF MAGNESIUM: CPT | Performed by: INTERNAL MEDICINE

## 2024-10-09 PROCEDURE — 84100 ASSAY OF PHOSPHORUS: CPT | Performed by: INTERNAL MEDICINE

## 2024-10-09 PROCEDURE — 85027 COMPLETE CBC AUTOMATED: CPT | Performed by: INTERNAL MEDICINE

## 2024-10-09 PROCEDURE — 99232 SBSQ HOSP IP/OBS MODERATE 35: CPT | Performed by: INTERNAL MEDICINE

## 2024-10-09 PROCEDURE — 80048 BASIC METABOLIC PNL TOTAL CA: CPT | Performed by: INTERNAL MEDICINE

## 2024-10-09 PROCEDURE — 82948 REAGENT STRIP/BLOOD GLUCOSE: CPT

## 2024-10-09 RX ORDER — METOLAZONE 5 MG/1
5 TABLET ORAL ONCE
Status: COMPLETED | OUTPATIENT
Start: 2024-10-09 | End: 2024-10-09

## 2024-10-09 RX ORDER — POTASSIUM CHLORIDE 1500 MG/1
40 TABLET, EXTENDED RELEASE ORAL ONCE
Status: COMPLETED | OUTPATIENT
Start: 2024-10-09 | End: 2024-10-09

## 2024-10-09 RX ADMIN — POTASSIUM CHLORIDE 40 MEQ: 1500 TABLET, EXTENDED RELEASE ORAL at 08:54

## 2024-10-09 RX ADMIN — PREGABALIN 50 MG: 50 CAPSULE ORAL at 21:40

## 2024-10-09 RX ADMIN — POTASSIUM CHLORIDE 40 MEQ: 1500 TABLET, EXTENDED RELEASE ORAL at 08:52

## 2024-10-09 RX ADMIN — INSULIN LISPRO 4 UNITS: 100 INJECTION, SOLUTION INTRAVENOUS; SUBCUTANEOUS at 21:41

## 2024-10-09 RX ADMIN — POTASSIUM CHLORIDE 40 MEQ: 1500 TABLET, EXTENDED RELEASE ORAL at 17:43

## 2024-10-09 RX ADMIN — INSULIN GLARGINE 6 UNITS: 100 INJECTION, SOLUTION SUBCUTANEOUS at 21:40

## 2024-10-09 RX ADMIN — BUMETANIDE 6 MG: 2 TABLET ORAL at 05:11

## 2024-10-09 RX ADMIN — ATORVASTATIN CALCIUM 10 MG: 10 TABLET, FILM COATED ORAL at 08:52

## 2024-10-09 RX ADMIN — METOPROLOL SUCCINATE 50 MG: 50 TABLET, EXTENDED RELEASE ORAL at 08:52

## 2024-10-09 RX ADMIN — INSULIN LISPRO 2 UNITS: 100 INJECTION, SOLUTION INTRAVENOUS; SUBCUTANEOUS at 17:42

## 2024-10-09 RX ADMIN — EMPAGLIFLOZIN 10 MG: 10 TABLET, FILM COATED ORAL at 08:55

## 2024-10-09 RX ADMIN — BUMETANIDE 6 MG: 2 TABLET ORAL at 18:21

## 2024-10-09 RX ADMIN — BUDESONIDE AND FORMOTEROL FUMARATE DIHYDRATE 2 PUFF: 160; 4.5 AEROSOL RESPIRATORY (INHALATION) at 08:55

## 2024-10-09 RX ADMIN — METOLAZONE 5 MG: 5 TABLET ORAL at 17:46

## 2024-10-09 RX ADMIN — FAMOTIDINE 20 MG: 20 TABLET, FILM COATED ORAL at 08:52

## 2024-10-09 RX ADMIN — BUMETANIDE 6 MG: 2 TABLET ORAL at 12:33

## 2024-10-09 RX ADMIN — INSULIN LISPRO 2 UNITS: 100 INJECTION, SOLUTION INTRAVENOUS; SUBCUTANEOUS at 08:51

## 2024-10-09 RX ADMIN — APIXABAN 5 MG: 5 TABLET, FILM COATED ORAL at 08:52

## 2024-10-09 RX ADMIN — UMECLIDINIUM 1 PUFF: 62.5 AEROSOL, POWDER ORAL at 08:55

## 2024-10-09 RX ADMIN — INSULIN LISPRO 2 UNITS: 100 INJECTION, SOLUTION INTRAVENOUS; SUBCUTANEOUS at 12:33

## 2024-10-09 RX ADMIN — BUDESONIDE AND FORMOTEROL FUMARATE DIHYDRATE 2 PUFF: 160; 4.5 AEROSOL RESPIRATORY (INHALATION) at 17:44

## 2024-10-09 RX ADMIN — APIXABAN 5 MG: 5 TABLET, FILM COATED ORAL at 17:43

## 2024-10-09 NOTE — ASSESSMENT & PLAN NOTE
Lab Results   Component Value Date    EGFR 36 10/09/2024    EGFR 34 10/08/2024    EGFR 37 10/07/2024    CREATININE 1.99 (H) 10/09/2024    CREATININE 2.09 (H) 10/08/2024    CREATININE 1.93 (H) 10/07/2024     Renal function appears baseline, monitor renal function and electrolytes closely while on diuretic infusion

## 2024-10-09 NOTE — PROGRESS NOTES
Progress Note - Heart Failure   Name: Mesfin Cano 59 y.o. male I MRN: [id number]  Unit/Bed#: [facility] 411-01 I Date of Admission: 10/4/2024   Date of Service: 10/9/2024 I Hospital Day: 5       Subjective:   Mesfin Cano is a 59-year-old man with PMH as below who presented to [facility] on 10/04/2024 with acute on chronic HF. Seen in cardiology office earlier this week and received 120 mg IV Lasix. Patient with weight gain the following day and lackluster urine output; advised to proceed to ED for admission.      Patient seen and examined. No significant events overnight. Feels ok today. Discouraged by weight gain.      Objective:   Intake/ Output: 840/3900/-3L  Weight: 323 lbs, stable.   Telemetry: SR, occ PVCs,    MAPs: 80-90.  Assessment & Plan  Acute on chronic diastolic congestive heart failure (HCC)  Wt Readings from Last 3 Encounters:   10/09/24 (!) 147 kg (323 lb)   10/03/24 (!) 150 kg (331 lb 11.2 oz)   10/01/24 (!) 147 kg (324 lb)     -exam much improved. Creat elevated above baseline but stable.   -Replace potassium   -Will continue on oral Bumex 6 mg TID with one time dose of Metolazone 5 mg before PM dose of Bumex. Assess response  -possibly restart MRA tomorrow if renal function stable.   -will need to check a Cardiomems reading at home immediately after discharge  -undergoing Abd US to assess for ascites today.   -please continue I/Os, daily standing weight, BMP      LHC 09/06/2022: no obstructive CAD.  TTE 04/11/2023: LVEF 50%. LVIDd 6.6 cm. Grade 2 DD. Normal RV. Trace MR and TR. Normal IVC.   RHC / MEMS calibration 10/11/2023: CardioMEMS data correlates to RHC.   TTE 06/20/2024: LVEF 55%. Normal RV. BRIAN (L>R). Normal IVC.      CardioMems in situ with PAD goal of 12-15 mmHg    Guideline Directed Medical Therapy:  --ARNi / ACEi / ARB: none.   --Aldosterone Antagonist:  Held.  --SGLT2 Inhibitor: empagliflozin 10 mg daily.      Volume Management:  --Home Diuretic: Bumex 6 mg TID, Metolazone dosing  TBD  --Inpatient Diuretic: IV Bumex 0.5 mg/hr.   --K supplementation: potassium 40 mEq BID.   Primary hypertension  -controlled on regimen above  Hyperlipidemia  Lab Results   Component Value Date    LDLCALC 86 10/06/2023   Rx Atorvastatin 10 mg daily    Morbid obesity (HCC)  -BMI 42  -weight loss encouraged.  VICKY (obstructive sleep apnea)  -without CPAP and equipment for >12 months. In process of getting new equipment.   Paroxysmal atrial fibrillation (HCC)  [id number] = 3 (HF, HTN, DM).  Anticoagulation on Eliquis.   Rate control: metoprolol succinate 50 mg daily.  Rhythm control: No.  Stage 3a chronic kidney disease ([facility])  Lab Results   Component Value Date    EGFR 36 10/09/2024    EGFR 34 10/08/2024    EGFR 37 10/07/2024    CREATININE 1.99 (H) 10/09/2024    CREATININE 2.09 (H) 10/08/2024    CREATININE 1.93 (H) 10/07/2024   -Acute on chronic  -Baseline creat around 1.5  -Creat stable around 2  Diabetic polyneuropathy associated with type 2 diabetes mellitus ([facility])  Lab Results   Component Value Date    HGBA1C 8.3 (H) 06/19/2024       Recent Labs     10/08/24  1153 10/08/24  1547 10/08/24  2059 10/09/24  0803   POCGLU 231* 187* 235* 158*       Blood Sugar Average: Last 72 hrs:  (P) [id number]    Moderate persistent asthma without complication  -management per primary team      Objective :  Temp:  [97.4 °F (36.3 °C)-98 °F (36.7 °C)] 97.4 °F (36.3 °C)  HR:  [71-98] 75  BP: (105-158)/(58-87) 125/75  Resp:  [18-19] 18  SpO2:  [94 %-100 %] 100 %  O2 Device: None (Room air)  Orthostatic Blood Pressures      Flowsheet Row Most Recent Value   Blood Pressure 125/75 filed at 10/09/2024 1128   Patient Position - Orthostatic VS Lying filed at 10/09/2024 1128          First Weight: Weight - Scale: (!) 149 kg (328 lb) (10/04/24 2040)  Vitals:    10/09/24 0517 10/09/24 0600   Weight: (!) 147 kg (323 lb) (!) 147 kg (323 lb)     Physical Exam  Vitals and nursing note reviewed.   Constitutional:       General: He is not  in acute distress.     Appearance: Normal appearance. He is obese.   HENT:      Head: Normocephalic and atraumatic.      Mouth/Throat:      Pharynx: Oropharynx is clear.   Cardiovascular:      Rate and Rhythm: Normal rate.      Pulses: Normal pulses.      Heart sounds: Normal heart sounds. No murmur heard.     Comments: Occ PVCs, possible PAF  Pulmonary:      Effort: Pulmonary effort is normal.      Breath sounds: Normal breath sounds. No rales.   Abdominal:      General: Bowel sounds are normal. There is no distension.      Palpations: Abdomen is soft.   Musculoskeletal:         General: Normal range of motion.      Cervical back: Normal range of motion and neck supple.      Right lower leg: No edema.      Left lower leg: No edema.   Skin:     General: Skin is warm and dry.      Capillary Refill: Capillary refill takes 2 to 3 seconds.   Neurological:      Mental Status: He is alert and oriented to person, place, and time.   Psychiatric:         Mood and Affect: Mood normal.         Behavior: Behavior normal.         Thought Content: Thought content normal.         Judgment: Judgment normal.           Lab Results: I have reviewed the following results:  Results from last 7 days   Lab Units 10/09/24  0555 10/08/24  0659 10/06/24  0431   WBC Thousand/uL 8.73 7.27 7.44   HEMOGLOBIN g/dL 12.1 11.3* 12.1   HEMATOCRIT % 39.5 36.3* 39.5   PLATELETS Thousands/uL 287 278 268     Results from last 7 days   Lab Units 10/09/24  0555 10/08/24  0659 10/07/24  0556   POTASSIUM mmol/L 3.4* 3.8 3.8   CHLORIDE mmol/L 99 99 96   CO2 mmol/L 26 26 25   BUN mg/dL 41* 41* 39*   CREATININE mg/dL 1.99* 2.09* 1.93*   CALCIUM mg/dL 8.5 8.6 8.8         Lab Results   Component Value Date    HGBA1C 8.3 (H) 06/19/2024     Lab Results   Component Value Date    TROPONINI <0.02 09/22/2021         Other Study Results Review: EKG was reviewed.     VTE Pharmacologic Prophylaxis: Sequential compression device (Venodyne) , with Eliquis

## 2024-10-09 NOTE — ASSESSMENT & PLAN NOTE
Lab Results   Component Value Date    EGFR 36 10/09/2024    EGFR 34 10/08/2024    EGFR 37 10/07/2024    CREATININE 1.99 (H) 10/09/2024    CREATININE 2.09 (H) 10/08/2024    CREATININE 1.93 (H) 10/07/2024   -Acute on chronic  -Baseline creat around 1.5  -Creat stable around 2

## 2024-10-09 NOTE — ASSESSMENT & PLAN NOTE
[id number] = 3 (HF, HTN, DM).  Anticoagulation on Eliquis.   Rate control: metoprolol succinate 50 mg daily.  Rhythm control: No.

## 2024-10-09 NOTE — ASSESSMENT & PLAN NOTE
Lab Results   Component Value Date    HGBA1C 8.3 (H) 06/19/2024       Recent Labs     10/08/24  1153 10/08/24  1547 10/08/24  2059 10/09/24  0803   POCGLU 231* 187* 235* 158*       Blood Sugar Average: Last 72 hrs:  (P) 186.5036424551323366

## 2024-10-09 NOTE — PROGRESS NOTES
Progress Note - Hospitalist   Name: Mesfin Cano 59 y.o. male I MRN: [id number]  [facility] I Date of Admission: 10/4/2024   Date of Service: 10/9/2024 I Hospital Day: 5    Assessment & Plan  Acute on chronic diastolic congestive heart failure (HCC)  Wt Readings from Last 3 Encounters:   10/09/24 (!) 147 kg (323 lb)   10/03/24 (!) 150 kg (331 lb 11.2 oz)   10/01/24 (!) 147 kg (324 lb)   This is a 59-year-old gentleman known to the cardiology and heart failure services for frequent exacerbations of his heart failure with preserved LVEF.  Seen in cardiology office earlier in the week and received IV Lasix 120 mg x 1 with poor urine output and weight gain above baseline approximately 16 pounds.  Dry weight 319.  Adherent to medications and lifestyle needs.  Home regimen: Bumex 6 mg 3 times daily with as needed metolazone, spironolactone  Last echocardiogram in our system appears to demonstrate EF 55% in 6/20/24 (diastolic function could not be assessed due to afib)  Bumex drip discontinued and transition to PTA Bumex 6 mg 3 times daily  May need standing metolazone dose for which patient may need longer monitoring  Continue Jardiance  Spironolactone held  Heart failure team consulted, appreciate recommendations  Monitor electrolytes and telemetry  Awaiting cariomems reading tomorrow AM  Per HF service, weights are above goal, to receive one dose of metolazone today, monitor for response.  Due to abdominal distension will check a limited US to evaluate for abdominal ascites  Primary hypertension  Continue Toprol XL and monitor BP closely  Hyperlipidemia  Continue atorvastatin 10 mg daily  Morbid obesity (HCC)  Patient would strongly benefit from weight loss therapy  VICKY (obstructive sleep apnea)  Not currently qualified for CPAP therapy, recommend outpt followup  Paroxysmal atrial fibrillation (HCC)  Rate controlled on Toprol  Anticoagulated on eliquis  Monitor on telemetry  Stage 3a chronic kidney disease  ([facility])  Lab Results   Component Value Date    EGFR 36 10/09/2024    EGFR 34 10/08/2024    EGFR 37 10/07/2024    CREATININE 1.99 (H) 10/09/2024    CREATININE 2.09 (H) 10/08/2024    CREATININE 1.93 (H) 10/07/2024     Renal function appears baseline, monitor renal function and electrolytes closely while on diuretic infusion  Diabetic polyneuropathy associated with type 2 diabetes mellitus ([facility])  Lab Results   Component Value Date    HGBA1C 8.3 (H) 06/19/2024       Recent Labs     10/08/24  1547 10/08/24  2059 10/09/24  0803 10/09/24  1200   POCGLU 187* 235* 158* 197*       Blood Sugar Average: Last 72 hrs:  (P) 187.1444871600037304    Hold home antihyperglycemics   Start Lantus 6 units at bedtime with mealtime correctional scale  Consistent carb diet  Moderate persistent asthma without complication  Continue albuterol, Symbicort, and umeclidinium    Onychomycosis  Appreciate podiatry assistance with nail clipping  Electrolyte abnormality  Potassium 3.8, stable  Continue potassium supplementation  Monitor   Iron deficiency  Ferritin 25, hemoglobin 12.1  IV Venofer 200 mg x 2 doses ordered  Will need outpatient colonoscopy and endoscopy, will place a referral  Vitamin B12 deficiency  Continue B12 supplementation    VTE Pharmacologic Prophylaxis: VTE Score: 3 Moderate Risk (Score 3-4) - Pharmacological DVT Prophylaxis Ordered: heparin.    Mobility:   Basic Mobility Inpatient Raw Score: 22  JH-HLM Goal: 7: Walk 25 feet or more  JH-HLM Achieved: 7: Walk 25 feet or more  JH-HLM Goal achieved. Continue to encourage appropriate mobility.    Patient Centered Rounds: I performed bedside rounds with nursing staff today.   Discussions with Specialists or Other Care Team Provider: nurse, CM    Current Length of Stay: 5 day(s)  Current Patient Status: Inpatient   Certification Statement: The patient will continue to require additional inpatient hospital stay due to medication adjustment today  Discharge Plan: Anticipate discharge  tomorrow to home.    Code Status: Level 1 - Full Code    Subjective   Reports that he feels stable but is frustrated that he is not at his goal weight, does have abdominal bloating today    Objective :  Temp:  [97.4 °F (36.3 °C)-97.9 °F (36.6 °C)] 97.8 °F (36.6 °C)  HR:  [70-98] 70  BP: (119-158)/(58-87) 131/71  Resp:  [18-20] 20  SpO2:  [94 %-100 %] 95 %  O2 Device: None (Room air)    Body mass index is 42.61 kg/m².     Input and Output Summary (last 24 hours):     Intake/Output Summary (Last 24 hours) at 10/9/2024 1634  Last data filed at 10/9/2024 1520  Gross per 24 hour   Intake 1080 ml   Output 3750 ml   Net -2670 ml       Physical Exam  Constitutional:       Appearance: Normal appearance.   HENT:      Head: Normocephalic and atraumatic.      Nose: Nose normal.   Eyes:      Extraocular Movements: Extraocular movements intact.   Cardiovascular:      Rate and Rhythm: Normal rate and regular rhythm.   Pulmonary:      Effort: Pulmonary effort is normal.      Breath sounds: No wheezing or rales.   Abdominal:      General: There is no distension.      Tenderness: There is abdominal tenderness.   Musculoskeletal:      Right lower leg: No edema.      Left lower leg: No edema.   Skin:     General: Skin is dry.   Neurological:      Mental Status: He is alert and oriented to person, place, and time.           Lines/Drains:        Telemetry:  Telemetry Orders (From admission, onward)               24 Hour Telemetry Monitoring  Continuous x 24 Hours (Telem)        Question:  Reason for 24 Hour Telemetry  Answer:  Decompensated CHF- and any one of the following: continuous diuretic infusion or total diuretic dose >200 mg daily, associated electrolyte derangement (I.e. K < 3.0), ionotropic drip (continuous infusion), hx of ventricular arrhythmia, or new EF < 35%                     Telemetry Reviewed: Normal Sinus Rhythm  Indication for Continued Telemetry Use: Acute CHF on >200 mg lasix/day or equivalent dose or with new  reduced EF.                Lab Results: I have reviewed the following results:   Results from last 7 days   Lab Units 10/09/24  0555 10/08/24  0659   WBC Thousand/uL 8.73 7.27   HEMOGLOBIN g/dL 12.1 11.3*   HEMATOCRIT % 39.5 36.3*   PLATELETS Thousands/uL 287 278   SEGS PCT %  --  67   LYMPHO PCT %  --  16   MONO PCT %  --  8   EOS PCT %  --  7*     Results from last 7 days   Lab Units 10/09/24  0555   SODIUM mmol/L 136   POTASSIUM mmol/L 3.4*   CHLORIDE mmol/L 99   CO2 mmol/L 26   BUN mg/dL 41*   CREATININE mg/dL 1.99*   ANION GAP mmol/L 11   CALCIUM mg/dL 8.5   GLUCOSE RANDOM mg/dL 162*         Results from last 7 days   Lab Units 10/09/24  1200 10/09/24  0803 10/08/24  2059 10/08/24  1547 10/08/24  1153 10/08/24  0740 10/07/24  2047 10/07/24  1643 10/07/24  1128 10/07/24  0753 10/06/24  2231 10/06/24  1626   POC GLUCOSE mg/dl 197* 158* 235* 187* 231* 112 211* 180* 185* 171* 174* 183*               Recent Cultures (last 7 days):         Imaging Results Review: No pertinent imaging studies reviewed.  Other Study Results Review: No additional pertinent studies reviewed.    Last 24 Hours Medication List:     Current Facility-Administered Medications:     acetaminophen (TYLENOL) tablet 650 mg, Q6H PRN    albuterol (PROVENTIL HFA,VENTOLIN HFA) inhaler 2 puff, Q6H PRN    apixaban (ELIQUIS) tablet 5 mg, BID    atorvastatin (LIPITOR) tablet 10 mg, Daily    budesonide-formoterol (SYMBICORT) 160-4.5 mcg/act inhaler 2 puff, BID    bumetanide (BUMEX) tablet 6 mg, TID (diuretic)    cyanocobalamin injection 1,000 mcg, Q30 Days    Empagliflozin (JARDIANCE) tablet 10 mg, Daily    famotidine (PEPCID) tablet 20 mg, Daily    insulin glargine (LANTUS) subcutaneous injection 6 Units 0.06 mL, HS    insulin lispro (HumALOG/ADMELOG) 100 units/mL subcutaneous injection 2-12 Units, TID AC **AND** Fingerstick Glucose (POCT), TID AC    insulin lispro (HumALOG/ADMELOG) 100 units/mL subcutaneous injection 2-12 Units, HS    metolazone  (ZAROXOLYN) tablet 5 mg, Once    metoprolol succinate (TOPROL-XL) 24 hr tablet 50 mg, Daily    nitroglycerin (NITROSTAT) SL tablet 0.4 mg, Q5 Min PRN    potassium chloride (Klor-Con M20) CR tablet 40 mEq, BID    potassium chloride (Klor-Con M20) CR tablet 40 mEq, Once    pregabalin (LYRICA) capsule 50 mg, HS    umeclidinium 62.5 mcg/actuation inhaler AEPB 1 puff, Daily    Administrative Statements   Today, Patient Was Seen By: Travis Champion MD  I have spent a total time of 45 minutes in caring for this patient on the day of the visit/encounter including Diagnostic results, Instructions for management, Patient and family education, Impressions, Counseling / Coordination of care, Documenting in the medical record, Reviewing / ordering tests, medicine, procedures  , Obtaining or reviewing history  , and Communicating with other healthcare professionals .    **Please Note: This note may have been constructed using a voice recognition system.**

## 2024-10-09 NOTE — ASSESSMENT & PLAN NOTE
Lab Results   Component Value Date    HGBA1C 8.3 (H) 06/19/2024       Recent Labs     10/08/24  1547 10/08/24  2059 10/09/24  0803 10/09/24  1200   POCGLU 187* 235* 158* 197*       Blood Sugar Average: Last 72 hrs:  (P) 187.2709121123083875    Hold home antihyperglycemics   Start Lantus 6 units at bedtime with mealtime correctional scale  Consistent carb diet

## 2024-10-09 NOTE — PLAN OF CARE
Problem: PAIN - ADULT  Goal: Verbalizes/displays adequate comfort level or baseline comfort level  Description: Interventions:  - Encourage patient to monitor pain and request assistance  - Assess pain using appropriate pain scale  - Administer analgesics based on type and severity of pain and evaluate response  - Implement non-pharmacological measures as appropriate and evaluate response  - Consider cultural and social influences on pain and pain management  - Notify physician/advanced practitioner if interventions unsuccessful or patient reports new pain  Outcome: Progressing     Problem: CARDIOVASCULAR - ADULT  Goal: Maintains optimal cardiac output and hemodynamic stability  Description: INTERVENTIONS:  - Monitor I/O, vital signs and rhythm  - Monitor for S/S and trends of decreased cardiac output  - Administer and titrate ordered vasoactive medications to optimize hemodynamic stability  - Assess quality of pulses, skin color and temperature  - Assess for signs of decreased coronary artery perfusion  - Instruct patient to report change in severity of symptoms  Outcome: Progressing  Goal: Absence of cardiac dysrhythmias or at baseline rhythm  Description: INTERVENTIONS:  - Continuous cardiac monitoring, vital signs, obtain 12 lead EKG if ordered  - Administer antiarrhythmic and heart rate control medications as ordered  - Monitor electrolytes and administer replacement therapy as ordered  Outcome: Progressing     Problem: RESPIRATORY - ADULT  Goal: Achieves optimal ventilation and oxygenation  Description: INTERVENTIONS:  - Assess for changes in respiratory status  - Assess for changes in mentation and behavior  - Position to facilitate oxygenation and minimize respiratory effort  - Oxygen administered by appropriate delivery if ordered  - Initiate smoking cessation education as indicated  - Encourage broncho-pulmonary hygiene including cough, deep breathe, Incentive Spirometry  - Assess the need for suctioning  and aspirate as needed  - Assess and instruct to report SOB or any respiratory difficulty  - Respiratory Therapy support as indicated  Outcome: Progressing     Problem: METABOLIC, FLUID AND ELECTROLYTES - ADULT  Goal: Electrolytes maintained within normal limits  Description: INTERVENTIONS:  - Monitor labs and assess patient for signs and symptoms of electrolyte imbalances  - Administer electrolyte replacement as ordered  - Monitor response to electrolyte replacements, including repeat lab results as appropriate  - Instruct patient on fluid and nutrition as appropriate  Outcome: Progressing  Goal: Fluid balance maintained  Description: INTERVENTIONS:  - Monitor labs   - Monitor I/O and WT  - Instruct patient on fluid and nutrition as appropriate  - Assess for signs & symptoms of volume excess or deficit  Outcome: Progressing  Goal: Glucose maintained within target range  Description: INTERVENTIONS:  - Monitor Blood Glucose as ordered  - Assess for signs and symptoms of hyperglycemia and hypoglycemia  - Administer ordered medications to maintain glucose within target range  - Assess nutritional intake and initiate nutrition service referral as needed  Outcome: Progressing     Problem: Prexisting or High Potential for Compromised Skin Integrity  Goal: Skin integrity is maintained or improved  Description: INTERVENTIONS:  - Identify patients at risk for skin breakdown  - Assess and monitor skin integrity  - Assess and monitor nutrition and hydration status  - Monitor labs   - Assess for incontinence   - Turn and reposition patient  - Assist with mobility/ambulation  - Relieve pressure over bony prominences  - Avoid friction and shearing  - Provide appropriate hygiene as needed including keeping skin clean and dry  - Evaluate need for skin moisturizer/barrier cream  - Collaborate with interdisciplinary team   - Patient/family teaching  - Consider wound care consult   Outcome: Progressing

## 2024-10-09 NOTE — ASSESSMENT & PLAN NOTE
Wt Readings from Last 3 Encounters:   10/09/24 (!) 147 kg (323 lb)   10/03/24 (!) 150 kg (331 lb 11.2 oz)   10/01/24 (!) 147 kg (324 lb)   This is a 59-year-old gentleman known to the cardiology and heart failure services for frequent exacerbations of his heart failure with preserved LVEF.  Seen in cardiology office earlier in the week and received IV Lasix 120 mg x 1 with poor urine output and weight gain above baseline approximately 16 pounds.  Dry weight 319.  Adherent to medications and lifestyle needs.  Home regimen: Bumex 6 mg 3 times daily with as needed metolazone, spironolactone  Last echocardiogram in our system appears to demonstrate EF 55% in 6/20/24 (diastolic function could not be assessed due to afib)  Bumex drip discontinued and transition to PTA Bumex 6 mg 3 times daily  May need standing metolazone dose for which patient may need longer monitoring  Continue Jardiance  Spironolactone held  Heart failure team consulted, appreciate recommendations  Monitor electrolytes and telemetry  Awaiting cariomems reading tomorrow AM  Per HF service, weights are above goal, to receive one dose of metolazone today, monitor for response.  Due to abdominal distension will check a limited US to evaluate for abdominal ascites

## 2024-10-09 NOTE — ASSESSMENT & PLAN NOTE
Wt Readings from Last 3 Encounters:   10/09/24 (!) 147 kg (323 lb)   10/03/24 (!) 150 kg (331 lb 11.2 oz)   10/01/24 (!) 147 kg (324 lb)     -exam much improved. Creat elevated above baseline but stable.   -Replace potassium   -Will continue on oral Bumex 6 mg TID with one time dose of Metolazone 5 mg before PM dose of Bumex. Assess response  -possibly restart MRA tomorrow if renal function stable.   -will need to check a Cardiomems reading at home immediately after discharge  -undergoing Abd US to assess for ascites today.   -please continue I/Os, daily standing weight, BMP      LHC 09/06/2022: no obstructive CAD.  TTE 04/11/2023: LVEF 50%. LVIDd 6.6 cm. Grade 2 DD. Normal RV. Trace MR and TR. Normal IVC.   RHC / MEMS calibration 10/11/2023: CardioMEMS data correlates to RHC.   TTE 06/20/2024: LVEF 55%. Normal RV. BRIAN (L>R). Normal IVC.      CardioMems in situ with PAD goal of 12-15 mmHg    Guideline Directed Medical Therapy:  --ARNi / ACEi / ARB: none.   --Aldosterone Antagonist:  Held.  --SGLT2 Inhibitor: empagliflozin 10 mg daily.      Volume Management:  --Home Diuretic: Bumex 6 mg TID, Metolazone dosing TBD  --Inpatient Diuretic: IV Bumex 0.5 mg/hr.   --K supplementation: potassium 40 mEq BID.

## 2024-10-10 ENCOUNTER — TRANSITIONAL CARE MANAGEMENT (OUTPATIENT)
Dept: FAMILY MEDICINE CLINIC | Facility: CLINIC | Age: 59
End: 2024-10-10

## 2024-10-10 ENCOUNTER — TELEPHONE (OUTPATIENT)
Dept: CARDIOLOGY CLINIC | Facility: CLINIC | Age: 59
End: 2024-10-10

## 2024-10-10 VITALS
OXYGEN SATURATION: 98 % | HEART RATE: 73 BPM | WEIGHT: 315 LBS | BODY MASS INDEX: 41.75 KG/M2 | RESPIRATION RATE: 18 BRPM | SYSTOLIC BLOOD PRESSURE: 126 MMHG | DIASTOLIC BLOOD PRESSURE: 74 MMHG | TEMPERATURE: 97.4 F | HEIGHT: 73 IN

## 2024-10-10 LAB
ANION GAP SERPL CALCULATED.3IONS-SCNC: 13 MMOL/L (ref 4–13)
BUN SERPL-MCNC: 40 MG/DL (ref 5–25)
CALCIUM SERPL-MCNC: 8.6 MG/DL (ref 8.4–10.2)
CHLORIDE SERPL-SCNC: 97 MMOL/L (ref 96–108)
CO2 SERPL-SCNC: 27 MMOL/L (ref 21–32)
CREAT SERPL-MCNC: 2.04 MG/DL (ref 0.6–1.3)
GFR SERPL CREATININE-BSD FRML MDRD: 35 ML/MIN/1.73SQ M
GLUCOSE SERPL-MCNC: 164 MG/DL (ref 65–140)
GLUCOSE SERPL-MCNC: 178 MG/DL (ref 65–140)
GLUCOSE SERPL-MCNC: 234 MG/DL (ref 65–140)
POTASSIUM SERPL-SCNC: 3.6 MMOL/L (ref 3.5–5.3)
SODIUM SERPL-SCNC: 137 MMOL/L (ref 135–147)

## 2024-10-10 PROCEDURE — 82948 REAGENT STRIP/BLOOD GLUCOSE: CPT

## 2024-10-10 PROCEDURE — 80048 BASIC METABOLIC PNL TOTAL CA: CPT | Performed by: NURSE PRACTITIONER

## 2024-10-10 PROCEDURE — 99239 HOSP IP/OBS DSCHRG MGMT >30: CPT | Performed by: INTERNAL MEDICINE

## 2024-10-10 PROCEDURE — 99232 SBSQ HOSP IP/OBS MODERATE 35: CPT | Performed by: INTERNAL MEDICINE

## 2024-10-10 RX ORDER — POTASSIUM CHLORIDE 1500 MG/1
40 TABLET, EXTENDED RELEASE ORAL 3 TIMES DAILY
Status: DISCONTINUED | OUTPATIENT
Start: 2024-10-10 | End: 2024-10-10 | Stop reason: HOSPADM

## 2024-10-10 RX ORDER — POTASSIUM CHLORIDE 1500 MG/1
40 TABLET, EXTENDED RELEASE ORAL ONCE
Status: COMPLETED | OUTPATIENT
Start: 2024-10-10 | End: 2024-10-10

## 2024-10-10 RX ORDER — BUMETANIDE 2 MG/1
6 TABLET ORAL 3 TIMES DAILY
Qty: 270 TABLET | Refills: 0 | Status: SHIPPED | OUTPATIENT
Start: 2024-10-10 | End: 2024-11-09

## 2024-10-10 RX ORDER — METOLAZONE 2.5 MG/1
5 TABLET ORAL 2 TIMES WEEKLY
Qty: 180 TABLET | Refills: 0 | Status: SHIPPED | OUTPATIENT
Start: 2024-10-10

## 2024-10-10 RX ORDER — LORATADINE 10 MG/1
10 TABLET ORAL AS NEEDED
Start: 2024-10-10

## 2024-10-10 RX ORDER — FAMOTIDINE 20 MG/1
20 TABLET, FILM COATED ORAL AS NEEDED
Start: 2024-10-10

## 2024-10-10 RX ORDER — POTASSIUM CHLORIDE 1500 MG/1
40 TABLET, EXTENDED RELEASE ORAL 3 TIMES DAILY
Qty: 180 TABLET | Refills: 0 | Status: SHIPPED | OUTPATIENT
Start: 2024-10-10

## 2024-10-10 RX ADMIN — UMECLIDINIUM 1 PUFF: 62.5 AEROSOL, POWDER ORAL at 08:08

## 2024-10-10 RX ADMIN — POTASSIUM CHLORIDE 40 MEQ: 1500 TABLET, EXTENDED RELEASE ORAL at 08:07

## 2024-10-10 RX ADMIN — METOPROLOL SUCCINATE 50 MG: 50 TABLET, EXTENDED RELEASE ORAL at 08:06

## 2024-10-10 RX ADMIN — EMPAGLIFLOZIN 10 MG: 10 TABLET, FILM COATED ORAL at 08:08

## 2024-10-10 RX ADMIN — APIXABAN 5 MG: 5 TABLET, FILM COATED ORAL at 08:06

## 2024-10-10 RX ADMIN — FAMOTIDINE 20 MG: 20 TABLET, FILM COATED ORAL at 08:06

## 2024-10-10 RX ADMIN — INSULIN LISPRO 4 UNITS: 100 INJECTION, SOLUTION INTRAVENOUS; SUBCUTANEOUS at 11:42

## 2024-10-10 RX ADMIN — ATORVASTATIN CALCIUM 10 MG: 10 TABLET, FILM COATED ORAL at 08:06

## 2024-10-10 RX ADMIN — INSULIN LISPRO 2 UNITS: 100 INJECTION, SOLUTION INTRAVENOUS; SUBCUTANEOUS at 08:07

## 2024-10-10 RX ADMIN — BUMETANIDE 6 MG: 2 TABLET ORAL at 11:39

## 2024-10-10 RX ADMIN — BUDESONIDE AND FORMOTEROL FUMARATE DIHYDRATE 2 PUFF: 160; 4.5 AEROSOL RESPIRATORY (INHALATION) at 08:07

## 2024-10-10 RX ADMIN — POTASSIUM CHLORIDE 40 MEQ: 1500 TABLET, EXTENDED RELEASE ORAL at 11:39

## 2024-10-10 RX ADMIN — BUMETANIDE 6 MG: 2 TABLET ORAL at 05:15

## 2024-10-10 NOTE — ASSESSMENT & PLAN NOTE
[id number] = 3 (HF, HTN, DM).  Anticoagulation on Eliquis.   Rate control: metoprolol succinate 50 mg daily.  Rhythm control: No.

## 2024-10-10 NOTE — ASSESSMENT & PLAN NOTE
Lab Results   Component Value Date    HGBA1C 8.3 (H) 06/19/2024       Recent Labs     10/09/24  1651 10/09/24  2139 10/10/24  0743 10/10/24  1125   POCGLU 178* 240* 178* 234*       Blood Sugar Average: Last 72 hrs:  (P) [id number]    Hold home antihyperglycemics   Start Lantus 6 units at bedtime with mealtime correctional scale  Consistent carb diet  Resume home regimen on discharge

## 2024-10-10 NOTE — TELEPHONE ENCOUNTER
Patient called & stated he was discharged form the hospital today. Stated he came home & took a CardioMem's reading & stated Dr Wilcox & RODRIGUE David requested to be notified of what today's reading states.    I am locked out of [employer] account & called [employer]. They are supposed to call me back with assistance.     This is the Website: [email or web address]    I will get CardioMem's reading tomorrow morning.

## 2024-10-10 NOTE — DISCHARGE INSTRUCTIONS
Patient Education     Heart Failure Discharge Instructions, Adult   About this topic   Heart failure is a condition where your heart does not pump well. Your heart has trouble pumping the right amount of blood throughout your body. Because of this, fluid can back up in your body and your organs may not get as much blood as they need.  Heart failure is a long-term problem and will get worse over time. Your doctor will work hard to treat your heart failure and to keep you as healthy as possible.     What care is needed at home?   Ask your doctor what you need to do when you go home. Make sure you ask questions if you do not understand what the doctor says. This way you will know what you need to do.  Get lots of rest. Sleep when you feel tired. Avoid doing tiring activities which can make you more short of breath.  Ask your doctor how much exercise you should do every day, such as taking a walk.  Limit how much beer, wine, and mixed drinks (alcohol) you drink.  Your doctor may ask you to limit the amount of salt in your diet. You may also be asked to limit how much fluid you drink.  Try to lose weight if you are overweight. Your doctor or nurse can help.  If you smoke, try to quit. Your doctor or nurse can help.  Let your doctor know if you get more tired while you do an activity or you are not able to do as much activity as you did before.  Be careful that you take your drugs each day as ordered by your doctor.  If you cannot afford your drugs, talk to your doctor.  Do not stop your drugs if you have side effects. Talk to your doctor about them.  Take your drugs even if you feel well.  Check your weight each morning and write down your weight in a notebook. This will tell you if you are building up too much fluid. Weigh in the morning after you have passed urine. Weigh yourself with clothes on or off, but do it the same way each day. Make sure your scale is on a hard surface, not on carpet. Your doctor will tell you  when you should call based on how much weight you gain in a day or over a week. Take your notebook to your doctor on your next visit.  What follow-up care is needed?   Your condition needs to be watched closely. Your doctor may ask you to make visits to the office to check on your progress. Be sure to keep these visits. You may need to have other tests. Your doctor's nurse may also call on a regular basis to ask you about your signs and whether your weight has increased.  Your doctor may order a heart rehab program for you after you leave the hospital. This is an important part of your care. Share your discharge information with the rehab staff so they can plan a program to help you recover. Let your doctor know if you need help finding a program.  You may see a dietitian to help you understand your diet. Some doctors and hospitals have classes to help you learn more about your heart failure and how to manage your symptoms.  What drugs may be needed?   Often patients will need to take more than one drug. Together, they will help the heart work as well as it can. Do not take any other prescription drugs, over-the-counter (OTC) drugs, herbals, or diet aids without asking your doctor.  The doctor may order drugs to:  Help relax blood vessels. This makes it easier for the heart to work and may also lower your blood pressure. These are ACE inhibitors, ARBs, and calcium channel blockers.  Slow down the heart rate so that it doesn't have to work as hard. These are beta-blockers.  Help the heart beat stronger and better. This is digoxin.  Get rid of extra salt and water in the body. These are water pills or diuretics.  Will physical activity be limited?   Talk to your doctor about when you may go back to your normal activities like work, driving, or sex.  Unless your doctor tells you to limit your activities, try to be active. Walk, garden, or do something active for 30 minutes or more on most days of the week. Stop  activity if you have symptoms like chest pain, shortness of breath, or feel dizzy.  What changes to diet are needed?   Ask your doctor or dietician what diet is best for you. The doctor may tell you to limit your salt and fat or how much fluid you drink.  The DASH diet may be helpful. The DASH diet helps to lower blood pressure. This diet includes lots of fruits, vegetables, low-fat dairy foods, and foods that are low in saturated fat, total fat, and cholesterol. Using the DASH diet with a low salt diet may lower blood pressure even more.  You will learn to read labels to see how much salt or sodium is in foods. Lowering your salt intake will help control some of your symptoms.  When do I need to call the doctor?   Activate the emergency medical system right away if you have signs of a heart attack. Call 911 in the [place] or [place]. The sooner treatment begins, the better your chances for recovery. Call for emergency help right away if you have:  Signs of heart attack, which may include:  Severe chest pain, pressure, or discomfort with:  Breathing trouble; sweating; upset stomach; or cold, clammy skin.  Pain in your arms, back, or jaw.  Worse pain with activity like walking up stairs.  Fast or irregular heartbeat.  Feeling dizzy, faint, or weak.     Call your doctor if:  You have so much trouble breathing that you can only say one or two words at a time.  You need to sit upright at all times to be able to breathe and/or cannot lie down.  You are very tired from working to catch your breath or you are sweating from trying to breathe.  You have trouble breathing when talking, sitting still, or with activity.  You have wheezing or chest tightness when you are resting.  You wake up at night because you can’t breathe well.  You become very confused or you faint.  You have a very fast or irregular heartbeat.  You cough more than normal or cough up frothy or pink saliva.  You feel very weak, dizzy, or more tired  than normal.  You need more pillows than normal to sleep.  You gain 2 to 3 pounds (0.9 to 1.4 kg) overnight or more than 5 pounds (2.3 kg) in 1 week.  You have more swelling than usual, especially in your feet and ankles or in your belly.  You feel like your heart is beating very fast.  Teach Back: Helping You Understand   The Teach Back Method helps you understand the information we are giving you. After you talk with the staff, tell them in your own words what you learned. This helps to make sure the staff has described each thing clearly. It also helps to explain things that may have been confusing. Before going home, make sure you can do these:  I can tell you about my condition.  I can tell you why I need to weigh myself each day.  I can tell you what I will do if I have signs of a heart attack or stroke.  Last Reviewed Date   2021-06-03  Consumer Information Use and Disclaimer   This generalized information is a limited summary of diagnosis, treatment, and/or medication information. It is not meant to be comprehensive and should be used as a tool to help the user understand and/or assess potential diagnostic and treatment options. It does NOT include all information about conditions, treatments, medications, side effects, or risks that may apply to a specific patient. It is not intended to be medical advice or a substitute for the medical advice, diagnosis, or treatment of a health care provider based on the health care provider's examination and assessment of a patient’s specific and unique circumstances. Patients must speak with a health care provider for complete information about their health, medical questions, and treatment options, including any risks or benefits regarding use of medications. This information does not endorse any treatments or medications as safe, effective, or approved for treating a specific patient. Up[employer], Inc[employer] and its affiliates disclaim any warranty or liability relating to this  information or the use thereof. The use of this information is governed by the Terms of Use, available at https://www.wolterskluwer.com/en/know/clinical-effectiveness-terms   Copyright   Copyright © 2024 [employer], Inc[employer] and its affiliates and/or licensors. All rights reserved.

## 2024-10-10 NOTE — ASSESSMENT & PLAN NOTE
Lab Results   Component Value Date    EGFR 35 10/10/2024    EGFR 36 10/09/2024    EGFR 34 10/08/2024    CREATININE 2.04 (H) 10/10/2024    CREATININE 1.99 (H) 10/09/2024    CREATININE 2.09 (H) 10/08/2024     Renal function appears baseline, monitor renal function and electrolytes closely while on diuretic infusion

## 2024-10-10 NOTE — ASSESSMENT & PLAN NOTE
Wt Readings from Last 3 Encounters:   10/10/24 (!) 145 kg (319 lb)   10/03/24 (!) 150 kg (331 lb 11.2 oz)   10/01/24 (!) 147 kg (324 lb)   This is a 59-year-old gentleman known to the cardiology and heart failure services for frequent exacerbations of his heart failure with preserved LVEF.  Seen in cardiology office earlier in the week and received IV Lasix 120 mg x 1 with poor urine output and weight gain above baseline approximately 16 pounds.  Dry weight 319.  Adherent to medications and lifestyle needs.  Home regimen: Bumex 6 mg 3 times daily with as needed metolazone, spironolactone  Last echocardiogram in our system appears to demonstrate EF 55% in 6/20/24 (diastolic function could not be assessed due to afib)  Bumex drip discontinued and transition to PTA Bumex 6 mg 3 times daily  May need standing metolazone dose for which patient may need longer monitoring  Continue Jardiance  Spironolactone held  Heart failure team consulted, appreciate recommendations  Euvolemic now at his dry weight  Continue bumex 6mg TID, KCL 40meq TID and metolazone 5mg twice weekly with an extra dose of Kcl

## 2024-10-10 NOTE — ASSESSMENT & PLAN NOTE
Lab Results   Component Value Date    EGFR 35 10/10/2024    EGFR 36 10/09/2024    EGFR 34 10/08/2024    CREATININE 2.04 (H) 10/10/2024    CREATININE 1.99 (H) 10/09/2024    CREATININE 2.09 (H) 10/08/2024   -Acute on chronic  -Baseline creat around 1.5  -Creat stable around 2   Not applicable

## 2024-10-10 NOTE — CASE MANAGEMENT
Case Management Discharge Planning Note    Patient name Mesfin Cano  Location [facility] 411/[facility] 411-01 MRN [id number]  : 1965 Date 10/10/2024       Current Admission Date: 10/4/2024  Current Admission Diagnosis:Acute on chronic diastolic congestive heart failure (HCC)   Patient Active Problem List    Diagnosis Date Noted Date Diagnosed    Iron deficiency 10/07/2024     Onychomycosis 10/05/2024     Electrolyte abnormality 10/05/2024     Anemia 2024     Bilateral leg edema 2024     Abnormal stress test 2024     Uncontrolled atrial flutter (HCC) 2024     Moderate persistent asthma without complication 2024     Status post cholecystectomy 2024     Diabetic neuropathy (HCC) 2023     Eosinophilia 10/18/2023     Anemia due to chronic kidney disease 10/16/2023     Nasal congestion 10/06/2023     Loose stools 2023     Acute on chronic diastolic congestive heart failure (HCC) 04/10/2023     Diabetic polyneuropathy associated with type 2 diabetes mellitus (HCC) 2022     Stage 3a chronic kidney disease (HCC) 2022     Presence of CardioMEMS HF system 2022     Paroxysmal atrial fibrillation (HCC) 2022     Hypokalemia 2021     HNP (herniated nucleus pulposus), lumbar 2021     Foraminal stenosis of lumbar region 2021     VICKY (obstructive sleep apnea)      Hyperlipidemia 2019     Primary osteoarthritis of both knees 2018     Irritable bowel syndrome with diarrhea 2018     Primary hypertension 2018     Vitamin B12 deficiency 2016     Vitamin D deficiency 2016     Morbid obesity (HCC) 2016     Primary osteoarthritis of right knee 10/15/2013       LOS (days): 6  Geometric Mean LOS (GMLOS) (days): 3.9  Days to GMLOS:-2     OBJECTIVE:  Risk of Unplanned Readmission Score: 40.22         Current admission status: Inpatient   Preferred Pharmacy:   [id number] - [place], [place] - [address] [place] [address]  [address] [address]  Phone: [phone] Fax: [phone]    Primary Care Provider: Soo Chavez MD    Primary Insurance: BLUE CROSS  Secondary Insurance:     DISCHARGE DETAILS:    Discharge planning discussed with:: Pt  Freedom of Choice: Yes  Comments - Freedom of Choice: Discussed FOC  CM contacted family/caregiver?: No- see comments (Pt alert and oriented.)  Were Treatment Team discharge recommendations reviewed with patient/caregiver?: Yes  Did patient/caregiver verbalize understanding of patient care needs?: Yes  Were patient/caregiver advised of the risks associated with not following Treatment Team discharge recommendations?: Yes    Contacts  Patient Contacts: [place], self  Relationship to Patient:: Other (Comment) (Self)  Contact Method: In Person  Reason/Outcome: Continuity of Care, Discharge Planning    Requested Home Health Care         Is the patient interested in HHC at discharge?: No    DME Referral Provided  Referral made for DME?: No    Other Referral/Resources/Interventions Provided:  Financial Resources Provided: Indigent Transportation  Referral Comments: CM met with pt at bedside to discuss SN f/u for CHF. Pt stated that he has been managing by himself for a long time and did not think it was necessary. He knows the symptoms and when to f/u. Pt does require transportation at d/c, stating that his wife is working and he is usually sent via shuttle or Lyft. CM to arrange when pt is cleared for d/c.          **1515: TC to [employer] service at [phone] for transportation home from [facility] [employer], , to .

## 2024-10-10 NOTE — PROGRESS NOTES
Progress Note - Heart Failure   Name: Mesfin Cano 59 y.o. male I MRN: [id number]  Unit/Bed#: [facility] 411-01 I Date of Admission: 10/4/2024   Date of Service: 10/10/2024 I Hospital Day: 6       Subjective:   Mesfin Cano is a 59-year-old man with PMH as below who presented to [facility] on 10/04/2024 with acute on chronic HF. Seen in cardiology office earlier this week and received 120 mg IV Lasix. Patient with weight gain the following day and lackluster urine output; advised to proceed to ED for admission.      Patient seen and examined. No significant events overnight. Feeling ok. For discharge today.      Objective:   Intake/ Output: 1320/3200/-1.9L  Telemetry: SR, occ PVCs,    MAPs: 80-90.  Assessment & Plan  Acute on chronic diastolic congestive heart failure (HCC)  Wt Readings from Last 3 Encounters:   10/10/24 (!) 145 kg (319 lb)   10/03/24 (!) 150 kg (331 lb 11.2 oz)   10/01/24 (!) 147 kg (324 lb)     -exam much improved. Creat elevated above baseline but stable.   -Abd US showed no ascites  -Will continue on oral Bumex 6 mg TID with twice weekly Metolazone and PRN  -Kdur 40 meq TID with additional dose with Metolazone.   -possibly restart MRA as outpatient.   -will need to check a Cardiomems reading at home immediately after discharge  -ok for discharge home today.     LHC 09/06/2022: no obstructive CAD.  TTE 04/11/2023: LVEF 50%. LVIDd 6.6 cm. Grade 2 DD. Normal RV. Trace MR and TR. Normal IVC.   RHC / MEMS calibration 10/11/2023: CardioMEMS data correlates to RHC.   TTE 06/20/2024: LVEF 55%. Normal RV. BRIAN (L>R). Normal IVC.      CardioMems in situ with PAD goal of 12 mmhg.     Guideline Directed Medical Therapy:  --ARNi / ACEi / ARB: none.   --Aldosterone Antagonist:  Held.  --SGLT2 Inhibitor: empagliflozin 10 mg daily.      Volume Management:  --Home Diuretic: Bumex 6 mg TID, Metolazone dosing TBD  --Inpatient Diuretic: IV Bumex 0.5 mg/hr.   --K supplementation: potassium 40 mEq BID.   Primary  hypertension  -controlled on regimen above  Hyperlipidemia  Lab Results   Component Value Date    LDLCALC 86 10/06/2023   Rx Atorvastatin 10 mg daily    Morbid obesity (HCC)  -BMI 42  -weight loss encouraged.  VICKY (obstructive sleep apnea)  -without CPAP and equipment for >12 months. In process of getting new equipment.   Paroxysmal atrial fibrillation (HCC)  [id number] = 3 (HF, HTN, DM).  Anticoagulation on Eliquis.   Rate control: metoprolol succinate 50 mg daily.  Rhythm control: No.  Stage 3a chronic kidney disease (HCC)  Lab Results   Component Value Date    EGFR 35 10/10/2024    EGFR 36 10/09/2024    EGFR 34 10/08/2024    CREATININE 2.04 (H) 10/10/2024    CREATININE 1.99 (H) 10/09/2024    CREATININE 2.09 (H) 10/08/2024   -Acute on chronic  -Baseline creat around 1.5  -Creat stable around 2  Diabetic polyneuropathy associated with type 2 diabetes mellitus (HCC)  Lab Results   Component Value Date    HGBA1C 8.3 (H) 06/19/2024       Recent Labs     10/09/24  1200 10/09/24  1651 10/09/24  2139 10/10/24  0743   POCGLU 197* 178* 240* 178*       Blood Sugar Average: Last 72 hrs:  (P) [id number]    Moderate persistent asthma without complication  -management per primary team  Iron deficiency  -receiving IV Venofer infusions    H/O gastric bypass    Objective :  Temp:  [97.3 °F (36.3 °C)-98.2 °F (36.8 °C)] 97.6 °F (36.4 °C)  HR:  [66-92] 76  BP: (113-131)/(57-75) 114/57  Resp:  [18-20] 18  SpO2:  [95 %-100 %] 95 %  O2 Device: None (Room air)  Orthostatic Blood Pressures      Flowsheet Row Most Recent Value   Blood Pressure 114/57 filed at 10/10/2024 0756   Patient Position - Orthostatic VS Lying filed at 10/10/2024 0756          First Weight: Weight - Scale: (!) 149 kg (328 lb) (10/04/24 2040)  Vitals:    10/10/24 0524 10/10/24 0600   Weight: (!) 145 kg (319 lb 4.8 oz) (!) 145 kg (319 lb)     Physical Exam  Vitals and nursing note reviewed.   Constitutional:       General: He is not in acute distress.      Appearance: Normal appearance. He is obese.   HENT:      Head: Normocephalic and atraumatic.      Mouth/Throat:      Pharynx: Oropharynx is clear.   Cardiovascular:      Rate and Rhythm: Normal rate.      Pulses: Normal pulses.      Heart sounds: Normal heart sounds. No murmur heard.     Comments: Occ PVCs, possible PAF  Pulmonary:      Effort: Pulmonary effort is normal.      Breath sounds: Normal breath sounds. No rales.   Abdominal:      General: Bowel sounds are normal. There is no distension.      Palpations: Abdomen is soft.   Musculoskeletal:         General: Normal range of motion.      Cervical back: Normal range of motion and neck supple.      Right lower leg: No edema.      Left lower leg: No edema.   Skin:     General: Skin is warm and dry.      Capillary Refill: Capillary refill takes 2 to 3 seconds.   Neurological:      Mental Status: He is alert and oriented to person, place, and time.   Psychiatric:         Mood and Affect: Mood normal.         Behavior: Behavior normal.         Thought Content: Thought content normal.         Judgment: Judgment normal.           Lab Results: I have reviewed the following results:  Results from last 7 days   Lab Units 10/09/24  0555 10/08/24  0659 10/06/24  0431   WBC Thousand/uL 8.73 7.27 7.44   HEMOGLOBIN g/dL 12.1 11.3* 12.1   HEMATOCRIT % 39.5 36.3* 39.5   PLATELETS Thousands/uL 287 278 268     Results from last 7 days   Lab Units 10/10/24  0523 10/09/24  0555 10/08/24  0659   POTASSIUM mmol/L 3.6 3.4* 3.8   CHLORIDE mmol/L 97 99 99   CO2 mmol/L 27 26 26   BUN mg/dL 40* 41* 41*   CREATININE mg/dL 2.04* 1.99* 2.09*   CALCIUM mg/dL 8.6 8.5 8.6         Lab Results   Component Value Date    HGBA1C 8.3 (H) 06/19/2024     Lab Results   Component Value Date    TROPONINI <0.02 09/22/2021         Other Study Results Review: EKG was reviewed.     VTE Pharmacologic Prophylaxis: Sequential compression device (Venodyne) , with Eliquis

## 2024-10-10 NOTE — DISCHARGE SUMMARY
Discharge Summary - Hospitalist   Name: Mesfin Cano 59 y.o. male I MRN: [id number]  [facility] I Date of Admission: 10/4/2024   Date of Service: 10/10/2024 I Hospital Day: 6     Assessment & Plan  Acute on chronic diastolic congestive heart failure (HCC)  Wt Readings from Last 3 Encounters:   10/10/24 (!) 145 kg (319 lb)   10/03/24 (!) 150 kg (331 lb 11.2 oz)   10/01/24 (!) 147 kg (324 lb)   This is a 59-year-old gentleman known to the cardiology and heart failure services for frequent exacerbations of his heart failure with preserved LVEF.  Seen in cardiology office earlier in the week and received IV Lasix 120 mg x 1 with poor urine output and weight gain above baseline approximately 16 pounds.  Dry weight 319.  Adherent to medications and lifestyle needs.  Home regimen: Bumex 6 mg 3 times daily with as needed metolazone, spironolactone  Last echocardiogram in our system appears to demonstrate EF 55% in 6/20/24 (diastolic function could not be assessed due to afib)  Bumex drip discontinued and transition to PTA Bumex 6 mg 3 times daily  May need standing metolazone dose for which patient may need longer monitoring  Continue Jardiance  Spironolactone held  Heart failure team consulted, appreciate recommendations  Euvolemic now at his dry weight  Continue bumex 6mg TID, KCL 40meq TID and metolazone 5mg twice weekly with an extra dose of Kcl    Primary hypertension  Continue Toprol XL and monitor BP closely  Hyperlipidemia  Continue atorvastatin 10 mg daily  Morbid obesity (HCC)  Patient would strongly benefit from weight loss therapy  VICKY (obstructive sleep apnea)  Not currently qualified for CPAP therapy, recommend outpt followup  Paroxysmal atrial fibrillation (HCC)  Rate controlled on Toprol  Anticoagulated on eliquis  Monitor on telemetry  Stage 3a chronic kidney disease (HCC)  Lab Results   Component Value Date    EGFR 35 10/10/2024    EGFR 36 10/09/2024    EGFR 34 10/08/2024    CREATININE 2.04 (H)  10/10/2024    CREATININE 1.99 (H) 10/09/2024    CREATININE 2.09 (H) 10/08/2024     Renal function appears baseline, monitor renal function and electrolytes closely while on diuretic infusion  Diabetic polyneuropathy associated with type 2 diabetes mellitus (HCC)  Lab Results   Component Value Date    HGBA1C 8.3 (H) 06/19/2024       Recent Labs     10/09/24  1651 10/09/24  2139 10/10/24  0743 10/10/24  1125   POCGLU 178* 240* 178* 234*       Blood Sugar Average: Last 72 hrs:  (P) [id number]    Hold home antihyperglycemics   Start Lantus 6 units at bedtime with mealtime correctional scale  Consistent carb diet  Resume home regimen on discharge  Moderate persistent asthma without complication  Continue albuterol, Symbicort, and umeclidinium    Onychomycosis  Appreciate podiatry assistance with nail clipping  Electrolyte abnormality  Potassium stable  Continue potassium supplementation  Monitor   Iron deficiency  Ferritin 25, hemoglobin 12.1  IV Venofer 200 mg x 2 doses ordered  Will need outpatient colonoscopy and endoscopy, will place a referral  Vitamin B12 deficiency  Continue B12 supplementation     Medical Problems       Resolved Problems  Date Reviewed: 8/8/2024   None       Discharging Physician / Practitioner: Travis Champion MD  PCP: Soo Chavez MD  Admission Date:   Admission Orders (From admission, onward)       Ordered        10/04/24 1518  INPATIENT ADMISSION  Once                          Discharge Date: 10/10/24    Consultations During Hospital Stay:  Podiatry  Heart failure    Procedures Performed:   None    Significant Findings / Test Results:   None    Incidental Findings:   None       Test Results Pending at Discharge (will require follow up):   None     Outpatient Tests Requested:  CBC, BMP, colonoscopy    Complications:  None    Reason for Admission: heart failure    Hospital Course:   Mesfin Cano is a 59 y.o. male patient who originally presented to the hospital on 10/4/2024  "due to heart failure. He was admitted and treated with IV diuresis. He achieved his goal weight as was discharged home. He was also noted to have iron and b12 deficiency anemia. He was supplemented in the hospital and referred to GI as an outpatient for colonoscopy.      Please see above list of diagnoses and related plan for additional information.     Condition at Discharge: stable    Discharge Day Visit / Exam:   Subjective:  denies any new complaints.  Vitals: Blood Pressure: 126/74 (10/10/24 1155)  Pulse: 73 (10/10/24 1155)  Temperature: (!) 97.4 °F (36.3 °C) (10/10/24 1155)  Temp Source: Oral (10/10/24 1155)  Respirations: 18 (10/10/24 1155)  Height: 6' 1\" (185.4 cm) (10/04/24 2040)  Weight - Scale: (!) 145 kg (319 lb) (10/10/24 0600)  SpO2: 98 % (10/10/24 1155)  Physical Exam  Constitutional:       Appearance: Normal appearance.   HENT:      Head: Normocephalic and atraumatic.      Nose: Nose normal.   Eyes:      Extraocular Movements: Extraocular movements intact.   Cardiovascular:      Rate and Rhythm: Normal rate and regular rhythm.   Pulmonary:      Effort: Pulmonary effort is normal.      Breath sounds: No wheezing or rales.   Musculoskeletal:      Right lower leg: No edema.      Left lower leg: No edema.   Skin:     General: Skin is warm and dry.   Neurological:      Mental Status: He is alert and oriented to person, place, and time.   Psychiatric:         Mood and Affect: Mood normal.         Behavior: Behavior normal.          Discharge instructions/Information to patient and family:   See after visit summary for information provided to patient and family.      Provisions for Follow-Up Care:  See after visit summary for information related to follow-up care and any pertinent home health orders.      Mobility at time of Discharge:   Basic Mobility Inpatient Raw Score: 23  JH-HLM Goal: 7: Walk 25 feet or more  JH-HLM Achieved: 7: Walk 25 feet or more  HLM Goal achieved. Continue to encourage appropriate " mobility.     Disposition:   Home    Planned Readmission: No    Discharge Medications:  See after visit summary for reconciled discharge medications provided to patient and/or family.      Administrative Statements   Discharge Statement:  I have spent a total time of 40 minutes in caring for this patient on the day of the visit/encounter. >30 minutes of time was spent on: Diagnostic results, Instructions for management, Patient and family education, Importance of tx compliance, Impressions, Counseling / Coordination of care, and Documenting in the medical record.    **Please Note: This note may have been constructed using a voice recognition system**

## 2024-10-10 NOTE — ASSESSMENT & PLAN NOTE
Lab Results   Component Value Date    HGBA1C 8.3 (H) 06/19/2024       Recent Labs     10/09/24  1200 10/09/24  1651 10/09/24  2139 10/10/24  0743   POCGLU 197* 178* 240* 178*       Blood Sugar Average: Last 72 hrs:  (P) [id number]

## 2024-10-10 NOTE — ASSESSMENT & PLAN NOTE
Wt Readings from Last 3 Encounters:   10/10/24 (!) 145 kg (319 lb)   10/03/24 (!) 150 kg (331 lb 11.2 oz)   10/01/24 (!) 147 kg (324 lb)     -exam much improved. Creat elevated above baseline but stable.   -Abd US showed no ascites  -Will continue on oral Bumex 6 mg TID with twice weekly Metolazone and PRN  -Kdur 40 meq TID with additional dose with Metolazone.   -possibly restart MRA as outpatient.   -will need to check a Cardiomems reading at home immediately after discharge  -ok for discharge home today.     LHC 09/06/2022: no obstructive CAD.  TTE 04/11/2023: LVEF 50%. LVIDd 6.6 cm. Grade 2 DD. Normal RV. Trace MR and TR. Normal IVC.   RHC / MEMS calibration 10/11/2023: CardioMEMS data correlates to RHC.   TTE 06/20/2024: LVEF 55%. Normal RV. BRIAN (L>R). Normal IVC.      CardioMems in situ with PAD goal of 12 mmhg.     Guideline Directed Medical Therapy:  --ARNi / ACEi / ARB: none.   --Aldosterone Antagonist:  Held.  --SGLT2 Inhibitor: empagliflozin 10 mg daily.      Volume Management:  --Home Diuretic: Bumex 6 mg TID, Metolazone dosing TBD  --Inpatient Diuretic: IV Bumex 0.5 mg/hr.   --K supplementation: potassium 40 mEq BID.

## 2024-10-11 DIAGNOSIS — Z59.41 FOOD INSECURITY: Primary | ICD-10-CM

## 2024-10-11 SDOH — ECONOMIC STABILITY - FOOD INSECURITY: FOOD INSECURITY: Z59.41

## 2024-10-11 NOTE — TELEPHONE ENCOUNTER
Returned call to patient & advised as per RODRIGUE David to do CardioMem's readings daily & report to CHF nurse.    Patient verbalized understanding & stated he was about to take reading today in 10-15 minutes & will return call w/reading #.    Also, scheduled patient for Hsp f/u OV for Tuesday, 10/15 @ 9 AM.

## 2024-10-11 NOTE — UTILIZATION REVIEW
NOTIFICATION OF ADMISSION DISCHARGE   This is a Notification of Discharge from [facility]. Please be advised that this patient has been discharge from our facility. Below you will find the admission and discharge date and time including the patient’s disposition.   UTILIZATION REVIEW CONTACT:  Bayron Candelaria  Utilization   Network Utilization Review Department  Phone: [phone] x carefully listen to the prompts. All voicemails are confidential.  Email: [email or web address]     ADMISSION INFORMATION  PRESENTATION DATE: 10/4/2024  1:27 PM  OBERVATION ADMISSION DATE: N/A  INPATIENT ADMISSION DATE: 10/4/24  3:18 PM   DISCHARGE DATE: 10/10/2024  4:32 PM   DISPOSITION:Home/Self Care    Network Utilization Review Department  ATTENTION: Please call with any questions or concerns to [phone] and carefully listen to the prompts so that you are directed to the right person. All voicemails are confidential.   For Discharge needs, contact Care Management DC Support Team at [phone] opt. 2  Send all requests for admission clinical reviews, approved or denied determinations and any other requests to dedicated fax number below belonging to the campus where the patient is receiving treatment. List of dedicated fax numbers for the Facilities:  FACILITY NAME UR FAX NUMBER   ADMISSION DENIALS (Administrative/Medical Necessity) [phone]   DISCHARGE SUPPORT TEAM ([facility]) [phone]   PARENT CHILD HEALTH (Maternity/NICU/Pediatrics) [phone]   [facility] [phone]   [facility] [phone]   [facility] [phone]   [facility] [phone]   [facility] [phone]   [facility] [phone]   [facility] [phone]   [facility] [phone]    [facility] [phone]   [facility] [phone]   [facility] [phone]   [facility] [phone]

## 2024-10-11 NOTE — TELEPHONE ENCOUNTER
Ok. Please encourage him to send a reading every day.     Also, Maria Luisa, can you please look in to why scheduling has not contacted him for a follow up appointment yet? Needs to be seen Monday or Tuesday. Thanks    Capri

## 2024-10-14 ENCOUNTER — TELEMEDICINE (OUTPATIENT)
Dept: FAMILY MEDICINE CLINIC | Facility: CLINIC | Age: 59
End: 2024-10-14
Payer: COMMERCIAL

## 2024-10-14 DIAGNOSIS — N18.31 STAGE 3A CHRONIC KIDNEY DISEASE (HCC): ICD-10-CM

## 2024-10-14 DIAGNOSIS — J45.40 MODERATE PERSISTENT ASTHMA WITHOUT COMPLICATION: ICD-10-CM

## 2024-10-14 DIAGNOSIS — E61.1 IRON DEFICIENCY: ICD-10-CM

## 2024-10-14 DIAGNOSIS — N18.32 TYPE 2 DIABETES MELLITUS WITH STAGE 3B CHRONIC KIDNEY DISEASE, WITHOUT LONG-TERM CURRENT USE OF INSULIN (HCC): ICD-10-CM

## 2024-10-14 DIAGNOSIS — Z12.12 SCREENING FOR COLORECTAL CANCER: ICD-10-CM

## 2024-10-14 DIAGNOSIS — E66.01 MORBID OBESITY DUE TO EXCESS CALORIES (HCC): Chronic | ICD-10-CM

## 2024-10-14 DIAGNOSIS — E78.2 MIXED HYPERLIPIDEMIA: Chronic | ICD-10-CM

## 2024-10-14 DIAGNOSIS — Z12.11 SCREENING FOR COLORECTAL CANCER: ICD-10-CM

## 2024-10-14 DIAGNOSIS — I50.33 ACUTE ON CHRONIC DIASTOLIC CONGESTIVE HEART FAILURE (HCC): Primary | ICD-10-CM

## 2024-10-14 DIAGNOSIS — E11.42 DIABETIC POLYNEUROPATHY ASSOCIATED WITH TYPE 2 DIABETES MELLITUS (HCC): ICD-10-CM

## 2024-10-14 DIAGNOSIS — I48.0 PAROXYSMAL ATRIAL FIBRILLATION (HCC): Chronic | ICD-10-CM

## 2024-10-14 DIAGNOSIS — I10 PRIMARY HYPERTENSION: ICD-10-CM

## 2024-10-14 DIAGNOSIS — E11.22 TYPE 2 DIABETES MELLITUS WITH STAGE 3B CHRONIC KIDNEY DISEASE, WITHOUT LONG-TERM CURRENT USE OF INSULIN (HCC): ICD-10-CM

## 2024-10-14 DIAGNOSIS — E53.8 VITAMIN B12 DEFICIENCY: Chronic | ICD-10-CM

## 2024-10-14 PROCEDURE — 99496 TRANSJ CARE MGMT HIGH F2F 7D: CPT | Performed by: FAMILY MEDICINE

## 2024-10-14 NOTE — PROGRESS NOTES
Virtual TCM Visit:    Verification of patient location:    Patient is located at Home in the following state in which I hold an active license [place]    Assessment & Plan  Acute on chronic diastolic congestive heart failure (HCC)  Wt Readings from Last 3 Encounters:   10/10/24 (!) 145 kg (319 lb)   10/03/24 (!) 150 kg (331 lb 11.2 oz)   10/01/24 (!) 147 kg (324 lb)     Patient was admitted to the hospital due to increased shortness of breath, bilateral leg edema.    He was treated with IV diuretics with improvement in symptoms.      Patient appears euvolemic on exam.    Continue current medical therapy including Bumex, Metolazone, Jardiance, Potassium chloride.    Follow a low-sodium diet.    Check daily weights.  Follow-up with cardiology CRNP tomorrow.         Primary hypertension  Blood pressure remains stable.  Continue Metoprolol ER 50 mg daily.       Paroxysmal atrial fibrillation (HCC)  Patient denies heart palpitations.    Continue beta-blockers, anticoagulation with Eliquis.    Follow-up with cardiology.       Type 2 diabetes mellitus with stage 3b chronic kidney disease, without long-term current use of insulin ([facility])    Lab Results   Component Value Date    HGBA1C 8.3 (H) 06/19/2024     Continue Januvia 100 mg half tablet daily, Jardiance 10 mg daily.  Continue to monitor blood sugar at home.  Recommended to watch diet more closely for carbohydrate intake.    Encouraged to work on weight loss.  Check eye exam by ophthalmology annually.  Follow-up with podiatry Dr. Goodman for routine foot care.      Orders:    Albumin / creatinine urine ratio    Hemoglobin A1C    Diabetic polyneuropathy associated with type 2 diabetes mellitus ([facility])    Lab Results   Component Value Date    HGBA1C 8.3 (H) 06/19/2024     Continue Lyrica 50 mg at bedtime.         Stage 3a chronic kidney disease ([facility])  Lab Results   Component Value Date    EGFR 35 10/10/2024    EGFR 36 10/09/2024    EGFR 34 10/08/2024    CREATININE 2.04 (H)  10/10/2024    CREATININE 1.99 (H) 10/09/2024    CREATININE 2.09 (H) 10/08/2024     Recommended to avoid NSAIDs, nephrotoxins.    Schedule follow-up visit with nephrology Dr. Vyas.         Moderate persistent asthma without complication  Symptoms are stable  Symptoms are stable.  Continue current inhalers, Singulair 10 mg at bedtime.       Vitamin B12 deficiency  Patient states that he received 2 Vit B12 injections in the hospital.    He will  at the pharmacy Vit B12 1000 mcg tablets and start taking 1 tab. daily.  Recommended to schedule follow-up visit with hematology.         Iron deficiency  Patient received IV Venofer infusion x 2.    Schedule visit with gastroenterology to discuss outpatient GI workup for anemia.         Hyperlipidemia  Continue Aorvastatin 10 mg daily.         Morbid obesity (HCC)      Encouraged  to work on dietary and lifestyle modifications, losing weight.       Screening for colorectal cancer                Schedule follow-up visit, physical exam in 6 weeks.    Encounter provider Soo Chavez MD     Provider located at [address] [id number]    After connecting through [employer], the patient was identified by name and date of birth. Mesfin Cano was informed that this is a telemedicine visit and that the visit is being conducted through the [employer] platform. He agrees to proceed..  My office door was closed. No one else was in the room.  He acknowledged consent and understanding of privacy and security of the video platform. The patient has agreed to participate and understands they can discontinue the visit at any time.    Patient is aware this is a billable service.    History of Present Illness     Transitional Care Management Review:   Mesfin Cano is a 59 y.o. male here for TCM follow up.    During the TCM phone call patient stated:  TCM Call       Date and time call was made   10/11/2024  1:58 PM    Hospital care reviewed  Records reviewed    Patient was hospitialized at  [facility]    Date of Admission  10/04/24    Date of discharge  10/10/24    Diagnosis  Acute on chronic diastolic congestive heart failure (HCC)    Disposition  Home    Were the patients medications reviewed and updated  No    Current Symptoms  None    Shortness of breath severity  Moderate          TCM Call       Post hospital issues  None    Should patient be enrolled in anticoag monitoring?  No    Scheduled for follow up?  Yes    Patients specialists  Cardiologist    Cardiologist name  Bobby Betancur MD    Cardiologist contact #  [phone]    Did you obtain your prescribed medications  Yes    Do you need help managing your prescriptions or medications  No    Is transportation to your appointment needed  No    I have advised the patient to call PCP with any new or worsening symptoms  Annmarie aSmuel     Living Arrangements  Family members    Support System  Family    The type of support provided  None    Do you have social support  Yes, some    Are you recieving any outpatient services  No    Are you recieving home care services  No    Types of home care services  Nurse visit; Home PT; Home health aid    Are you using any community resources  No    Current waiver services  No    Have you fallen in the last 12 months  No    Interperter language line needed  No    Counseling  Patient          HPI     Patient presents for virtual video TCM visit.    He was admitted to [facility] 10/4/24 -10/10/24 for acute on chronic diastolic congestive heart failure.    He was admitted to the hospital with increased shortness of breath, BL leg edema, was treated with IV diuretics.      He  was noted to have iron and vitamin B12 deficiency anemia.    Received Venofer infusions x 2 and 2 vitamin B12 injections.    Patient was recommended start taking Vit B12 1000 mcg daily, schedule follow-up visit with  gastroenterology, outpatient colonoscopy.    Reviewed hospital records, test results discharge medications with patient.    Blood test done upon discharge home on October 10, 2024 showed potassium 3.6, creatinine 2.04, GFR 35, glucose 164.    Patient reports improvement in BL leg edema.    Shortness of breath at baseline.  Denies chest pain, dizziness.      Fasting blood sugar in the morning  was 131.    Patient c/o upset stomach, nausea, feeling weak.  He feels that his symptoms are related to stomach virus.  Denies fever, chills.  Reports no vomiting.    Patient has appointment scheduled with cardiology CRNP tomorrow.    Review of Systems   Constitutional:  Positive for appetite change (decreased appetite) and fatigue. Negative for activity change, chills and fever.   HENT:  Negative for congestion and sore throat.    Eyes: Negative.    Respiratory:  Positive for shortness of breath (stable). Negative for cough, chest tightness and wheezing.    Cardiovascular:  Positive for leg swelling. Negative for chest pain (improved) and palpitations.   Gastrointestinal:  Positive for nausea. Negative for abdominal pain, diarrhea and vomiting.   Genitourinary:  Negative for difficulty urinating, dysuria and hematuria.   Musculoskeletal:  Positive for arthralgias. Negative for joint swelling.   Skin:  Negative for rash.   Neurological:  Negative for dizziness, syncope and headaches.   Psychiatric/Behavioral:  Negative for dysphoric mood and sleep disturbance. The patient is not nervous/anxious.      Objective     There were no vitals taken for this visit.    Physical Exam  Vitals and nursing note reviewed.   Constitutional:       General: He is not in acute distress.     Appearance: Normal appearance. He is not toxic-appearing.      Comments: Morbidly obese   HENT:      Head: Normocephalic and atraumatic.      Nose: No congestion.   Eyes:      Conjunctiva/sclera: Conjunctivae normal.      Pupils: Pupils are equal, round, and  reactive to light.   Cardiovascular:      Comments: Reports no chest pain   Pulmonary:      Breath sounds: No wheezing.   Abdominal:      General: There is no distension.   Musculoskeletal:         General: No swelling.      Cervical back: Normal range of motion and neck supple.      Right lower leg: No edema.      Left lower leg: No edema.   Skin:     Findings: No rash.   Neurological:      Mental Status: He is alert.   Psychiatric:         Mood and Affect: Mood normal.       Medications have been reviewed by provider in current encounter      Soo Chavez MD      VIRTUAL VISIT DISCLAIMER    Mesfin Cano verbally agrees to participate in Virtual Care Services. Pt is aware that Virtual Care Services could be limited without vital signs or the ability to perform a full hands-on physical exam. Mesfin Cano understands he or the provider may request at any time to terminate the video visit and request the patient to seek care or treatment in person.

## 2024-10-15 ENCOUNTER — TELEPHONE (OUTPATIENT)
Dept: CARDIOLOGY CLINIC | Facility: CLINIC | Age: 59
End: 2024-10-15

## 2024-10-15 ENCOUNTER — PATIENT OUTREACH (OUTPATIENT)
Dept: CARDIOLOGY CLINIC | Facility: CLINIC | Age: 59
End: 2024-10-15

## 2024-10-15 NOTE — TELEPHONE ENCOUNTER
F/U call to patient who stated he has a GI bug with N/V, diarrhea, chills. That is why he rescheduled OV today for Thursday. Had a virtual visit with PCP yesterday. JAGRUTI    Today's CardioMefermin's reading - 13

## 2024-10-15 NOTE — TELEPHONE ENCOUNTER
F/U call to patient. Left message to return call to office when he has completed Cardiomem's today.    No reading for CardioMem's today, 10/15 yet.  No reading for yesterday, 10/14 either.  10/13 - 12  10/12 - No reading  10/11 - 12  10/10 - 13

## 2024-10-15 NOTE — PROGRESS NOTES
Received IB Mssg from OP Nena ANDREA CM that [facility] entered referral for Food Insecurity.     Outpatient Advanced Heart Failure LCSW assigned self to referral after collaborating with Ms. Monteiro.  Patient is previously known to this LCSW from recent admission at [facility].    Placed Outreach Call to patient. He identified himself on voice message so left detailed message and requested return call.

## 2024-10-16 NOTE — ASSESSMENT & PLAN NOTE
Patient received IV Venofer infusion x 2.    Schedule visit with gastroenterology to discuss outpatient GI workup for anemia.

## 2024-10-16 NOTE — ASSESSMENT & PLAN NOTE
Lab Results   Component Value Date    EGFR 35 10/10/2024    EGFR 36 10/09/2024    EGFR 34 10/08/2024    CREATININE 2.04 (H) 10/10/2024    CREATININE 1.99 (H) 10/09/2024    CREATININE 2.09 (H) 10/08/2024     Recommended to avoid NSAIDs, nephrotoxins.    Schedule follow-up visit with nephrology Dr. Vyas.

## 2024-10-16 NOTE — ASSESSMENT & PLAN NOTE
Wt Readings from Last 3 Encounters:   10/10/24 (!) 145 kg (319 lb)   10/03/24 (!) 150 kg (331 lb 11.2 oz)   10/01/24 (!) 147 kg (324 lb)     Patient was admitted to the hospital due to increased shortness of breath, bilateral leg edema.    He was treated with IV diuretics with improvement in symptoms.      Patient appears euvolemic on exam.    Continue current medical therapy including Bumex, Metolazone, Jardiance, Potassium chloride.    Follow a low-sodium diet.    Check daily weights.  Follow-up with cardiology CRNP tomorrow.

## 2024-10-16 NOTE — ASSESSMENT & PLAN NOTE
Patient denies heart palpitations.    Continue beta-blockers, anticoagulation with Eliquis.    Follow-up with cardiology.

## 2024-10-16 NOTE — ASSESSMENT & PLAN NOTE
Patient states that he received 2 Vit B12 injections in the hospital.    He will  at the pharmacy Vit B12 1000 mcg tablets and start taking 1 tab. daily.  Recommended to schedule follow-up visit with hematology.

## 2024-10-16 NOTE — ASSESSMENT & PLAN NOTE
Lab Results   Component Value Date    HGBA1C 8.3 (H) 06/19/2024     Continue Lyrica 50 mg at bedtime.

## 2024-10-22 ENCOUNTER — TELEPHONE (OUTPATIENT)
Dept: CARDIOLOGY CLINIC | Facility: CLINIC | Age: 59
End: 2024-10-22

## 2024-10-22 NOTE — TELEPHONE ENCOUNTER
F/U call to patient. Left a message with c/b number & request for patient to return call to office.    CardioMem's Readings:  10/15 - 13  10/16 - 15  10/17 - No reading  10/18 - No reading  10/19 - No reading  10/20 - 17  10/21 - 13

## 2024-10-25 ENCOUNTER — TELEPHONE (OUTPATIENT)
Dept: CARDIOLOGY CLINIC | Facility: CLINIC | Age: 59
End: 2024-10-25

## 2024-10-25 NOTE — TELEPHONE ENCOUNTER
Patient called office & stated he had the flu, which is why he canceled his hsp f/u appointment.  Stated he is now recovering from the flu, is still congested, & feeling run down. Otherwise, patient stated he is feeling good.    Asked for CardioMem's readings.  10/20 - 17  10/21 - 13  10/22 - 13  10/23 - 16  10/24 - 16  10/25 - 16    Today's Wt - 319 lb    Denied any excess fluid to lower extremities, is @ baseline. Denied an increase in SOB.  Denied chest pain or discomfort.    Taking Metolazone 5 mg 2 x/week, on Wednesdays & Saturdays. Taking all other cardiac medications as ordered.    Rescheduled hsp f/u visit for 10/29.    Please advise.          Render In Strict Bullet Format?: No Plan: Ammonium Lactate 2% lotion or Amlactin cream/lotion recommended 1-2 times daily. Advised patient to avoid face. Recommended gentle lotion for face as needed for dryness. [address] Detail Level: Zone

## 2024-10-25 NOTE — TELEPHONE ENCOUNTER
F/U call to patient & read him Dr Wilcox instructions.    Patient verbalized understanding & will follow Dr Wilcox instructions.

## 2024-10-28 NOTE — TELEPHONE ENCOUNTER
Returned call to patient  read him Dr Wilcox message & instructions.    Patient verbalized understanding.   Patient has a hsp f/u OV scheduled for tomorrow morning.

## 2024-10-28 NOTE — PROGRESS NOTES
Heart Failure Outpatient Visit    Mesfin Cano 59 y.o. male   MRN: [id number]  Encounter: [id number]    Assessment:  Patient Active Problem List    Diagnosis Date Noted    Nasal congestion 10/06/2023    Loose stools 04/17/2023    Acute on chronic diastolic congestive heart failure (HCC) 04/10/2023    Diabetic polyneuropathy associated with type 2 diabetes mellitus (HCC) 12/20/2022    Stage 3a chronic kidney disease (HCC) 09/16/2022    Presence of CardioMEMS HF system 03/09/2022    Paroxysmal atrial fibrillation (HCC) 03/01/2022    HNP (herniated nucleus pulposus), lumbar 02/23/2021    Foraminal stenosis of lumbar region 02/23/2021    VICKY (obstructive sleep apnea)     Hyperlipidemia 04/18/2019    Primary osteoarthritis of both knees 06/14/2018    Irritable bowel syndrome with diarrhea 01/30/2018    Primary hypertension 01/30/2018    Vitamin B12 deficiency 03/08/2016    Vitamin D deficiency 03/08/2016    Morbid obesity (HCC) 02/23/2016    Primary osteoarthritis of right knee 10/15/2013    Iron deficiency 10/07/2024    Onychomycosis 10/05/2024    Electrolyte abnormality 10/05/2024    Anemia 07/31/2024    Bilateral leg edema 07/02/2024    Abnormal stress test 06/26/2024    Uncontrolled atrial flutter (HCC) 06/18/2024    Moderate persistent asthma without complication 06/18/2024    Status post cholecystectomy 02/17/2024    Diabetic neuropathy (HCC) 12/23/2023    Eosinophilia 10/18/2023    Anemia due to chronic kidney disease 10/16/2023    Hypokalemia 11/14/2021       Today's Plan:  Warm, reasonable intravascular volume status on exam with element of dependent edema. Continue current regimen; due to take metolazone tomorrow.  Recommended graduated compression socks and elevation.  Posthospital BMP pending still; will go for this today. Has been taking spironolactone since discharge.  Already referred for PSG by PCP; waiting to be scheduled for this.  2g sodium restriction, 2L fluid restriction, daily weight. Regular  CardioMems PADs.   RTC 2 weeks.    Plan:  Acute on chronic HFpEF; LVEF 55%; LVIDd 6.0 cm  Etiology: Afib, HTN, obesity phenotype.  LHC 09/06/2022: no obstructive CAD.  TTE 04/11/2023: LVEF 50%. LVIDd 6.6 cm. Grade 2 DD. Normal RV. Trace MR and TR. Normal IVC.   RHC / MEMS calibration 10/11/2023: CardioMEMS data correlates to RHC.   TTE 06/20/2024: LVEF 55%. Normal RV. BRIAN (L>R). Normal IVC.      Guideline Directed Medical Therapy:  --ARNi / ACEi / ARB: none.   --Aldosterone Antagonist: spironolactone 25 mg daily. Held on discharge, but has been taking.  --SGLT2 Inhibitor: empagliflozin 10 mg daily.      Volume Management:  --Home Diuretic: Bumex 6 mg TID with metolazone 5 mg W/Sat and PRN   --K supplementation: potassium 40 mEq BID.                  Advanced Therapies:              --CardioMEMS: implanted 03/09/2022. PAD goal 12 mmHg.     Atrial fibrillation, parosxysmal              [id number] = 3 (HF, HTN, DM).              Anticoagulation on Eliquis.               Rate control: metoprolol succinate 50 mg daily.              Rhythm control: No.     Chronic kidney disease, stage IIIb              Baseline creatinine of 1.5-2.0.     Hypertension  Hyperlipidemia  Asthma, severe persistent: follows with Dr. Noonan as outpatient.   Obstructive sleep apnea: without CPAP and equipment for >12 months. In process of getting new equipment.   Diabetes mellitus, type II  History of gastric bypass (Samuel-en-Y) surgery   History of tobacco abuse    HPI:   Mesfin Cano is a 59-year-old male with a PMH as above who presents for follow up. Follows with Dr. Wilcox.    59 year old male with chronic HFpEF, atrial fibrillation/flutter, hypertension, hyperlipidemia and CKD 3b recently admitted for decompensated heart failure and failed outpatient escalation of diuretics. Diuresed on Bumex drip and transitioned to Bumex 6 mg TID with twice weekly and PRN metolazone.    10/29/24: Presents today for follow up. Recently recovered from the  flu. Was having some issues with weight gain; was 335 lbs on home scale over the weekend, down to 322 lbs on home scale today after taking metolazone over the weekend. Was 319 lbs (reported dry weight) on office scale today. Breathing at baseline, no PND. Sleeps on his couch at home. Does have swelling in his ankles and feet. No abdominal distension.     Past Medical History:   Diagnosis Date    Acute gastritis without hemorrhage     last assessed 3/24/17    Asthma     Atrial fibrillation (HCC)     Bilateral leg edema 02/19/2020    CHF (congestive heart failure) ([facility])     Coronary artery disease     Daytime sleepiness 07/17/2019    Diabetes mellitus ([facility])     Dyspnea on exertion 10/11/2021    Edema of both feet 01/23/2020    Gastric bypass status for obesity     Hyperlipidemia     Hypertension     Hypokalemia 11/14/2021    Impaired fasting glucose     last assessed 5/10/17    Knee sprain, bilateral 06/14/2018    Leg cramps 06/16/2022    Obesity     Viral gastroenteritis     last assessed 11/4/16     Review of Systems   Constitutional:  Negative for chills and fever.   HENT:  Negative for ear pain and sore throat.    Eyes:  Negative for pain and visual disturbance.   Respiratory:  Negative for cough and shortness of breath.    Cardiovascular:  Positive for leg swelling. Negative for chest pain and palpitations.   Gastrointestinal:  Negative for abdominal distention, abdominal pain and vomiting.   Genitourinary:  Negative for dysuria and hematuria.   Musculoskeletal:  Negative for arthralgias and back pain.   Skin:  Negative for color change and rash.   Neurological:  Negative for seizures and syncope.   All other systems reviewed and are negative.  14-point ROS completed and negative except as stated above and/or in the HPI.      Allergies   Allergen Reactions    Azithromycin Other (See Comments)     Shaky, uneasy feeling     Bactrim [Sulfamethoxazole-Trimethoprim] Other (See Comments)     shakiness       Current  Outpatient Medications:     Accu-Chek FastClix Lancets MISC, Test blood sugar twice daily (Patient taking differently: Takes blood sugar three times a week provider aware), Disp: 200 each, Rfl: 3    acetaminophen (TYLENOL) 325 mg tablet, Take 2 tablets (650 mg total) by mouth every 6 (six) hours as needed for mild pain, headaches or fever, Disp: , Rfl:     albuterol (PROVENTIL HFA,VENTOLIN HFA) 90 mcg/act inhaler, INHALE 2 PUFFS EVERY 4 HOURS AS NEEDED FOR WHEEZING OR SHORTNESS OF BREATH, Disp: 18 g, Rfl: 5    aluminum-magnesium hydroxide 200-200 MG/5ML suspension, Take 5 mL by mouth every 6 (six) hours as needed for heartburn, Disp: 355 mL, Rfl: 0    atorvastatin (LIPITOR) 10 mg tablet, TAKE 1 TABLET BY MOUTH EVERY DAY, Disp: 90 tablet, Rfl: 3    Blood Glucose Monitoring Suppl (Accu-Chek Guide) w/Device KIT, Use 2 (two) times a day Test blood sugar twice daily, Disp: 1 kit, Rfl: 0    budesonide-formoterol (Symbicort) 160-4.5 mcg/act inhaler, Inhale 2 puffs 2 (two) times a day Rinse mouth after use., Disp: 30.6 g, Rfl: 2    bumetanide (BUMEX) 2 mg tablet, Take 3 tablets (6 mg total) by mouth 3 (three) times a day, Disp: 270 tablet, Rfl: 0    cyanocobalamin (VITAMIN B-12) 1000 MCG tablet, Take 1 tablet (1,000 mcg total) by mouth daily, Disp: 30 tablet, Rfl: 0    Eliquis 5 MG, TAKE 1 TABLET BY MOUTH TWICE A DAY, Disp: 60 tablet, Rfl: 5    Empagliflozin (JARDIANCE) 10 MG TABS tablet, Take 1 tablet (10 mg total) by mouth daily, Disp: 30 tablet, Rfl: 11    famotidine (PEPCID) 20 mg tablet, Take 1 tablet (20 mg total) by mouth if needed for heartburn As needed twice a day, Disp: , Rfl:     fluticasone (FLONASE) 50 mcg/act nasal spray, 1 spray into each nostril 2 (two) times a day, Disp: , Rfl: 0    loratadine (CLARITIN) 10 mg tablet, Take 1 tablet (10 mg total) by mouth if needed for allergies As needed daily, Disp: , Rfl:     metolazone (ZAROXOLYN) 2.5 mg tablet, Take 2 tablets (5 mg total) by mouth 2 (two) times a week  Take 20-30 minutes before Bumex dose and with additional potassium, Disp: 180 tablet, Rfl: 0    metoprolol succinate (TOPROL-XL) 50 mg 24 hr tablet, Take 1 tablet (50 mg total) by mouth daily, Disp: 30 tablet, Rfl: 5    montelukast (SINGULAIR) 10 mg tablet, Take 1 tablet (10 mg total) by mouth daily at bedtime, Disp: 90 tablet, Rfl: 1    nitroglycerin (NITROSTAT) 0.4 mg SL tablet, Place 1 tablet (0.4 mg total) under the tongue every 5 (five) minutes as needed for chest pain for up to 50 doses, Disp: 50 tablet, Rfl: 0    potassium chloride (Klor-Con M20) 20 mEq tablet, Take 2 tablets (40 mEq total) by mouth 3 (three) times a day, Disp: 180 tablet, Rfl: 0    pregabalin (LYRICA) 50 mg capsule, Take 1 caps. at bedtime, Disp: 30 capsule, Rfl: 5    sitaGLIPtin (Januvia) 100 mg tablet, TAKE 1/2 TABLET BY MOUTH EVERY DAY, Disp: 15 tablet, Rfl: 5    EPINEPHrine (EPIPEN) 0.3 mg/0.3 mL SOAJ, Inject 0.3 mL (0.3 mg total) into a muscle once for 1 dose, Disp: 0.6 mL, Rfl: 0    tiotropium (Spiriva Respimat) 1.25 MCG/ACT AERS inhaler, Inhale 2 puffs daily, Disp: 4 g, Rfl: 0    Social History     Socioeconomic History    Marital status: /Civil Union     Spouse name: Not on file    Number of children: Not on file    Years of education: Not on file    Highest education level: Not on file   Occupational History    Not on file   Tobacco Use    Smoking status: Former     Current packs/day: 1.00     Average packs/day: 1 pack/day for 5.0 years (5.0 ttl pk-yrs)     Types: Pipe, Cigars, Cigarettes     Start date: 2016     Quit date: 1/1/2021     Passive exposure: Never    Smokeless tobacco: Former    Tobacco comments:     cigar once a day   Vaping Use    Vaping status: Never Used   Substance and Sexual Activity    Alcohol use: Yes     Alcohol/week: 2.0 standard drinks of alcohol     Types: 2 Shots of liquor per week     Comment: social    Drug use: No    Sexual activity: Yes     Partners: Female     Birth control/protection: None  "  Other Topics Concern    Not on file   Social History Narrative    Not on file     Social Determinants of Health     Financial Resource Strain: Not on file   Food Insecurity: Food Insecurity Present (10/5/2024)    Nursing - Inadequate Food Risk Classification     Worried About Running Out of Food in the Last Year: Sometimes true     Ran Out of Food in the Last Year: Never true     Ran Out of Food in the Last Year: Not on file   Transportation Needs: No Transportation Needs (10/5/2024)    PRAPARE - Transportation     Lack of Transportation (Medical): No     Lack of Transportation (Non-Medical): No   Physical Activity: Not on file   Stress: Not on file   Social Connections: Not on file   Intimate Partner Violence: Not on file   Housing Stability: Low Risk  (10/5/2024)    Housing Stability Vital Sign     Unable to Pay for Housing in the Last Year: No     Number of Times Moved in the Last Year: 0     Homeless in the Last Year: No     Family History   Problem Relation Age of Onset    Stroke Mother     Hypertension Father     Cancer Father     COPD Father        Vitals:  Blood pressure 100/68, pulse 88, height 6' 1\" (1.854 m), weight (!) 145 kg (319 lb), SpO2 100%.  Body mass index is 42.09 kg/m².  Wt Readings from Last 10 Encounters:   10/29/24 (!) 145 kg (319 lb)   10/10/24 (!) 145 kg (319 lb)   10/03/24 (!) 150 kg (331 lb 11.2 oz)   10/01/24 (!) 147 kg (324 lb)   08/20/24 (!) 144 kg (317 lb)   08/08/24 (!) 145 kg (319 lb)   08/08/24 (!) 149 kg (328 lb 3.2 oz)   08/04/24 (!) 144 kg (318 lb 6.4 oz)   07/19/24 (!) 150 kg (330 lb)   06/26/24 (!) 149 kg (328 lb 3.2 oz)     Vitals:    10/29/24 0937   BP: 100/68   BP Location: Left arm   Patient Position: Sitting   Cuff Size: Standard   Pulse: 88   SpO2: 100%   Weight: (!) 145 kg (319 lb)   Height: 6' 1\" (1.854 m)       Physical Exam  Constitutional:       Appearance: Normal appearance. He is obese.   HENT:      Head: Normocephalic.      Nose: Nose normal.      Mouth/Throat: "      Mouth: Mucous membranes are moist.   Eyes:      Conjunctiva/sclera: Conjunctivae normal.   Neck:      Vascular: No JVD.   Cardiovascular:      Rate and Rhythm: Normal rate and regular rhythm.   Pulmonary:      Effort: Pulmonary effort is normal.      Breath sounds: Normal breath sounds.   Musculoskeletal:      Cervical back: Neck supple.      Right lower le+ Pitting Edema present.      Left lower le+ Pitting Edema present.   Skin:     General: Skin is warm and dry.   Neurological:      General: No focal deficit present.      Mental Status: He is alert and oriented to person, place, and time.   Psychiatric:         Mood and Affect: Mood normal.         Behavior: Behavior normal.         Labs & Results:  Lab Results   Component Value Date    WBC 8.73 10/09/2024    HGB 12.1 10/09/2024    HCT 39.5 10/09/2024    MCV 89 10/09/2024     10/09/2024     Lab Results   Component Value Date    SODIUM 137 10/10/2024    K 3.6 10/10/2024    CL 97 10/10/2024    CO2 27 10/10/2024    BUN 40 (H) 10/10/2024    CREATININE 2.04 (H) 10/10/2024    GLUC 164 (H) 10/10/2024    CALCIUM 8.6 10/10/2024     Lab Results   Component Value Date    INR 1.29 (H) 10/12/2023    PROTIME 15.9 (H) 10/12/2023     Lab Results   Component Value Date     (H) 10/04/2024      Lab Results   Component Value Date    NTBNP 697 (H) 04/10/2023          Thank you for the opportunity to participate in the care of this patient.    Maria Fernanda Finn PA-C

## 2024-10-28 NOTE — TELEPHONE ENCOUNTER
Patient's weight on Friday, 10/25   Wt - 319 lb. CardioMem's - 16.  Patient took the extra dose of Metolazone & Potassium as ordered by Dr Wilcox.    Patient called office today stating he had a weight gain of 11 lbs on Saturday, 10/26, Wt - 335 lb.   Feet were really swollen.  Did not do CardioMem's on 10/26.  Stated he took the scheduled Saturday dose of Metolazone 5 mg, & took an extra dose of Metolazone yesterday, Sunday, 10/27, due to weight gain.  Today's Wt - 325 lb    Patient stated he was not going to take Metolazone today, but if his weight remains elevated tomorrow, he will take a dose of it tomorrow & skip Wednesday dose of regularly scheduled Metolazone.    CardioMem's  10/27 - 12  10/28 - 11    Please advise.

## 2024-10-29 ENCOUNTER — TELEPHONE (OUTPATIENT)
Dept: CARDIOLOGY CLINIC | Facility: CLINIC | Age: 59
End: 2024-10-29

## 2024-10-29 ENCOUNTER — TELEPHONE (OUTPATIENT)
Age: 59
End: 2024-10-29

## 2024-10-29 ENCOUNTER — OFFICE VISIT (OUTPATIENT)
Dept: CARDIOLOGY CLINIC | Facility: CLINIC | Age: 59
End: 2024-10-29
Payer: COMMERCIAL

## 2024-10-29 VITALS
OXYGEN SATURATION: 100 % | HEART RATE: 88 BPM | SYSTOLIC BLOOD PRESSURE: 100 MMHG | WEIGHT: 315 LBS | DIASTOLIC BLOOD PRESSURE: 68 MMHG | BODY MASS INDEX: 41.75 KG/M2 | HEIGHT: 73 IN

## 2024-10-29 DIAGNOSIS — I48.0 PAROXYSMAL ATRIAL FIBRILLATION (HCC): ICD-10-CM

## 2024-10-29 DIAGNOSIS — I10 PRIMARY HYPERTENSION: ICD-10-CM

## 2024-10-29 DIAGNOSIS — N18.30 STAGE 3 CHRONIC KIDNEY DISEASE, UNSPECIFIED WHETHER STAGE 3A OR 3B CKD (HCC): ICD-10-CM

## 2024-10-29 DIAGNOSIS — I50.32 CHRONIC HEART FAILURE WITH PRESERVED EJECTION FRACTION (HCC): Primary | ICD-10-CM

## 2024-10-29 DIAGNOSIS — Z95.818 PRESENCE OF CARDIOMEMS HF SYSTEM: ICD-10-CM

## 2024-10-29 DIAGNOSIS — Z09 HOSPITAL DISCHARGE FOLLOW-UP: ICD-10-CM

## 2024-10-29 DIAGNOSIS — G47.33 OBSTRUCTIVE SLEEP APNEA: ICD-10-CM

## 2024-10-29 DIAGNOSIS — R60.0 LOWER LEG EDEMA: ICD-10-CM

## 2024-10-29 PROCEDURE — 99214 OFFICE O/P EST MOD 30 MIN: CPT | Performed by: PHYSICIAN ASSISTANT

## 2024-10-29 NOTE — TELEPHONE ENCOUNTER
Samples given at today's ov per [employer].    Jardiance 10 mg  Lot#: [id number]  Exp. Date: 03/26  Count Dispensed: 5

## 2024-10-29 NOTE — PATIENT INSTRUCTIONS
Get labs today as ordered.   Continue current medications.    Please weigh yourself every day (after emptying your bladder) and keep a detailed log of weights.   Contact the Heart Failure program at [phone] if you gain 3+ lbs overnight or 5+ lbs in 5-7 days.  Limit daily sodium/salt intake to 2000 mg daily to prevent fluid retention.  Avoid canned foods, fast food/Chinese food, and processed meats (hot dogs, lunch meat, and sausage etc.). Caution with condiments.  Limit fluid intake to 2000 mL or 2 liters (about 60-65 ounces) daily.  Avoid electrolyte replacement drinks (such as Gatorade, Pedialyte, Propel, Liquid IV, etc.).  Bring complete list of medications and log of daily weights to your follow-up appointment.

## 2024-10-29 NOTE — TELEPHONE ENCOUNTER
Does the patient want to see a Gastroenterologist prior to their procedure OR are they having any GI symptoms? no    Has the patient been hospitalized or had abdominal surgery in the past 6 months? no    Does the patient use supplemental oxygen? no    Does the patient take Coumadin, Lovenox, Plavix, Elliquis, Xarelto, or other blood thinning medication? yes    Has the patient had a stroke, cardiac event, or stent placed in the past year? yes    Pt is on eliquis and has congestive heart failure

## 2024-10-30 ENCOUNTER — CLINICAL SUPPORT (OUTPATIENT)
Dept: CARDIOLOGY CLINIC | Facility: CLINIC | Age: 59
End: 2024-10-30
Payer: COMMERCIAL

## 2024-10-30 DIAGNOSIS — I50.32 CHRONIC HEART FAILURE WITH PRESERVED EJECTION FRACTION (HCC): Primary | ICD-10-CM

## 2024-10-30 PROCEDURE — 93264 REM MNTR WRLS P-ART PRS SNR: CPT | Performed by: INTERNAL MEDICINE

## 2024-10-31 ENCOUNTER — PATIENT OUTREACH (OUTPATIENT)
Dept: CARDIOLOGY CLINIC | Facility: CLINIC | Age: 59
End: 2024-10-31

## 2024-10-31 NOTE — PROGRESS NOTES
Workqueue indicated that 2nd patient outreach attempt is due.  OP Advanced [facility] LCSW reviewed electronic chart and plan to call patient tomorrow.

## 2024-11-01 ENCOUNTER — PATIENT OUTREACH (OUTPATIENT)
Dept: CARDIOLOGY CLINIC | Facility: CLINIC | Age: 59
End: 2024-11-01

## 2024-11-01 NOTE — LETTER
November 1, 2024     Keon Gironz    Patient: Mesfin Cano   YOB: 1965   Date of Visit: 11/1/2024       Dear Mr. Cano:    I hope that you are doing well.    I received a referral from your provider, Dr. Soo Chavez that you would like to discuss access to adequate nutrition.    I have been unable to reach you via phone, therefore your social work referral has been closed.    If you'd like to discuss access to adequate nutrition in the future, please contact your provider to enter another referral.        Sincerely,    Herminia Suarez, PRAVIN  [facility]patient [facility]   [facility]  [phone]        CC: No Recipients

## 2024-11-01 NOTE — PROGRESS NOTES
2nd Patient Outreach Attempt.    [facility]  He identified himself on the voice mailbox so left detailed message and requested return call.     Sent UTR Letter and closed the referral in workqueue.     November 1, 2024     Keon Gironz    Patient: Mesfin Cano   YOB: 1965   Date of Visit: 11/1/2024       Dear Mr. Cano:    I hope that you are doing well.    I received a referral from your provider, Dr. Soo Chavez that you would like to discuss access to adequate nutrition.    I have been unable to reach you via phone, therefore your social work referral has been closed.    If you'd like to discuss access to adequate nutrition in the future, please contact your provider to enter another referral.        Sincerely,    Herminia Suarez [facility]  [facility]    [facility]  [phone]        CC: No Recipients

## 2024-11-05 ENCOUNTER — TELEPHONE (OUTPATIENT)
Dept: CARDIOLOGY CLINIC | Facility: CLINIC | Age: 59
End: 2024-11-05

## 2024-11-05 NOTE — TELEPHONE ENCOUNTER
Placed a f/u call to patient. Left a message with c/b number & request for patient to return call to office.

## 2024-11-14 ENCOUNTER — TELEPHONE (OUTPATIENT)
Dept: CARDIOLOGY CLINIC | Facility: CLINIC | Age: 59
End: 2024-11-14

## 2024-11-14 NOTE — TELEPHONE ENCOUNTER
Patient called requesting a reading of his CardioMem's. Stated he is experiencing lower back pain & numbness & tingling to lower extremities. Stated this symptom accompanies an increase in fluid volume.    CardioMem's Readings:  11/4 - 13 11/5 - 15  11/6 - 14  11/7 - 16 11/8 - 14 11/9 - None  11/10 - 16 11/11 - 16 11/12 - 15  11/13 - 13 11/14 - 16    Today's Wt - 321 lb    Please advise.    Patient also requested a letter excusing him for jury duty.   Patient's Juror ID #[id number]  Stated he needs a letter to be faxed, stating his ill health makes him ineligible for jury duty. Is to be faxed to [facility] [employer] F: [phone].

## 2024-11-15 NOTE — TELEPHONE ENCOUNTER
Please check when the last metolazone dose was. He needs diuretic escalation.     Please start letter.

## 2024-11-15 NOTE — TELEPHONE ENCOUNTER
Called patient who stated he was unable to get his CardioMem's reading today, but is going home now & will get it.  Stated his last dose of Metolazone was Wednesday. Next dose due tomorrow.    Do you want patient to take an extra dose of Metolazone 5 mg today?    Please advise.

## 2024-11-15 NOTE — TELEPHONE ENCOUNTER
Patient's CardioMem's reading for today   Is 15.  Called patient & shared the above information & read him Dr Wilcox order for one extra dose of Metolazone 5 mg & Potassium 40 mEq today.    Patient verbalized understanding & will take both today.

## 2024-11-15 NOTE — TELEPHONE ENCOUNTER
Faxed excuse letter to [facility] [employer] F: [phone], excusing patient for jury duty.

## 2024-11-22 ENCOUNTER — TELEPHONE (OUTPATIENT)
Dept: CARDIOLOGY CLINIC | Facility: CLINIC | Age: 59
End: 2024-11-22

## 2024-11-22 NOTE — TELEPHONE ENCOUNTER
Patient of Dr Wilcox who is out of the office.    Patient's CardioMem's readings have been elevated.  11/15 - 15  11/16 - None  11/17 - 14 11/18 - 17 11/19 - 16 11/20 - 18 11/21 - 16 11/22 - 16    Called patient who stated he is feeling good. Today's Wt - 319 lb    Denied any edema to lower extremities or abdominal bloating. Baseline dyspnea, no increase.    Will take Metolazone 5 mg tomorrow as ordered for two times/week.    Please advise.

## 2024-11-22 NOTE — TELEPHONE ENCOUNTER
Returned call to patient & read him Dr Nelson's instructions.    Patient verbalized understanding.  Will f/u Monday with CardioMem's, symptoms & weight.

## 2024-11-23 ENCOUNTER — NURSE TRIAGE (OUTPATIENT)
Dept: OTHER | Facility: OTHER | Age: 59
End: 2024-11-23

## 2024-11-23 ENCOUNTER — HOSPITAL ENCOUNTER (INPATIENT)
Facility: HOSPITAL | Age: 59
LOS: 6 days | Discharge: HOME | DRG: 291 | End: 2024-11-29
Attending: EMERGENCY MEDICINE | Admitting: INTERNAL MEDICINE
Payer: COMMERCIAL

## 2024-11-23 ENCOUNTER — APPOINTMENT (EMERGENCY)
Dept: RADIOLOGY | Facility: HOSPITAL | Age: 59
DRG: 291 | End: 2024-11-23
Payer: COMMERCIAL

## 2024-11-23 DIAGNOSIS — I50.32 CHRONIC HEART FAILURE WITH PRESERVED EJECTION FRACTION (HCC): ICD-10-CM

## 2024-11-23 DIAGNOSIS — I50.33 ACUTE ON CHRONIC DIASTOLIC CONGESTIVE HEART FAILURE (HCC): Primary | ICD-10-CM

## 2024-11-23 PROBLEM — E11.65 UNCONTROLLED TYPE 2 DIABETES MELLITUS WITH HYPERGLYCEMIA (HCC): Status: ACTIVE | Noted: 2023-12-23

## 2024-11-23 LAB
2HR DELTA HS TROPONIN: -1 NG/L
ALBUMIN SERPL BCG-MCNC: 3.8 G/DL (ref 3.5–5)
ALP SERPL-CCNC: 108 U/L (ref 34–104)
ALT SERPL W P-5'-P-CCNC: 5 U/L (ref 7–52)
ANION GAP SERPL CALCULATED.3IONS-SCNC: 15 MMOL/L (ref 4–13)
AST SERPL W P-5'-P-CCNC: 10 U/L (ref 13–39)
BASOPHILS # BLD AUTO: 0.05 THOUSANDS/ÂΜL (ref 0–0.1)
BASOPHILS NFR BLD AUTO: 1 % (ref 0–1)
BILIRUB SERPL-MCNC: 0.46 MG/DL (ref 0.2–1)
BNP SERPL-MCNC: 254 PG/ML (ref 0–100)
BUN SERPL-MCNC: 21 MG/DL (ref 5–25)
CALCIUM SERPL-MCNC: 7.8 MG/DL (ref 8.4–10.2)
CARDIAC TROPONIN I PNL SERPL HS: 12 NG/L (ref ?–50)
CARDIAC TROPONIN I PNL SERPL HS: 13 NG/L (ref ?–50)
CHLORIDE SERPL-SCNC: 101 MMOL/L (ref 96–108)
CO2 SERPL-SCNC: 21 MMOL/L (ref 21–32)
CREAT SERPL-MCNC: 1.27 MG/DL (ref 0.6–1.3)
EOSINOPHIL # BLD AUTO: 0.38 THOUSAND/ÂΜL (ref 0–0.61)
EOSINOPHIL NFR BLD AUTO: 4 % (ref 0–6)
ERYTHROCYTE [DISTWIDTH] IN BLOOD BY AUTOMATED COUNT: 16.7 % (ref 11.6–15.1)
EST. AVERAGE GLUCOSE BLD GHB EST-MCNC: 163 MG/DL
GFR SERPL CREATININE-BSD FRML MDRD: 62 ML/MIN/1.73SQ M
GLUCOSE SERPL-MCNC: 142 MG/DL (ref 65–140)
GLUCOSE SERPL-MCNC: 151 MG/DL (ref 65–140)
GLUCOSE SERPL-MCNC: 180 MG/DL (ref 65–140)
HBA1C MFR BLD: 7.3 %
HCT VFR BLD AUTO: 36.8 % (ref 36.5–49.3)
HGB BLD-MCNC: 11.8 G/DL (ref 12–17)
IMM GRANULOCYTES # BLD AUTO: 0.07 THOUSAND/UL (ref 0–0.2)
IMM GRANULOCYTES NFR BLD AUTO: 1 % (ref 0–2)
LYMPHOCYTES # BLD AUTO: 0.89 THOUSANDS/ÂΜL (ref 0.6–4.47)
LYMPHOCYTES NFR BLD AUTO: 10 % (ref 14–44)
MCH RBC QN AUTO: 28.3 PG (ref 26.8–34.3)
MCHC RBC AUTO-ENTMCNC: 32.1 G/DL (ref 31.4–37.4)
MCV RBC AUTO: 88 FL (ref 82–98)
MONOCYTES # BLD AUTO: 0.6 THOUSAND/ÂΜL (ref 0.17–1.22)
MONOCYTES NFR BLD AUTO: 7 % (ref 4–12)
NEUTROPHILS # BLD AUTO: 6.55 THOUSANDS/ÂΜL (ref 1.85–7.62)
NEUTS SEG NFR BLD AUTO: 77 % (ref 43–75)
NRBC BLD AUTO-RTO: 0 /100 WBCS
PLATELET # BLD AUTO: 238 THOUSANDS/UL (ref 149–390)
PMV BLD AUTO: 9.9 FL (ref 8.9–12.7)
POTASSIUM SERPL-SCNC: 3.9 MMOL/L (ref 3.5–5.3)
PROT SERPL-MCNC: 6.6 G/DL (ref 6.4–8.4)
RBC # BLD AUTO: 4.17 MILLION/UL (ref 3.88–5.62)
SODIUM SERPL-SCNC: 137 MMOL/L (ref 135–147)
WBC # BLD AUTO: 8.54 THOUSAND/UL (ref 4.31–10.16)

## 2024-11-23 PROCEDURE — 99285 EMERGENCY DEPT VISIT HI MDM: CPT

## 2024-11-23 PROCEDURE — 96374 THER/PROPH/DIAG INJ IV PUSH: CPT

## 2024-11-23 PROCEDURE — 80053 COMPREHEN METABOLIC PANEL: CPT

## 2024-11-23 PROCEDURE — 82948 REAGENT STRIP/BLOOD GLUCOSE: CPT

## 2024-11-23 PROCEDURE — 83036 HEMOGLOBIN GLYCOSYLATED A1C: CPT | Performed by: INTERNAL MEDICINE

## 2024-11-23 PROCEDURE — 84484 ASSAY OF TROPONIN QUANT: CPT

## 2024-11-23 PROCEDURE — 99223 1ST HOSP IP/OBS HIGH 75: CPT | Performed by: INTERNAL MEDICINE

## 2024-11-23 PROCEDURE — 83880 ASSAY OF NATRIURETIC PEPTIDE: CPT

## 2024-11-23 PROCEDURE — 93005 ELECTROCARDIOGRAM TRACING: CPT

## 2024-11-23 PROCEDURE — 99285 EMERGENCY DEPT VISIT HI MDM: CPT | Performed by: EMERGENCY MEDICINE

## 2024-11-23 PROCEDURE — 36415 COLL VENOUS BLD VENIPUNCTURE: CPT

## 2024-11-23 PROCEDURE — 71045 X-RAY EXAM CHEST 1 VIEW: CPT

## 2024-11-23 PROCEDURE — 85025 COMPLETE CBC W/AUTO DIFF WBC: CPT

## 2024-11-23 RX ORDER — POLYETHYLENE GLYCOL 3350 17 G/17G
17 POWDER, FOR SOLUTION ORAL DAILY
Status: DISCONTINUED | OUTPATIENT
Start: 2024-11-24 | End: 2024-11-29 | Stop reason: HOSPADM

## 2024-11-23 RX ORDER — INSULIN LISPRO 100 [IU]/ML
1-5 INJECTION, SOLUTION INTRAVENOUS; SUBCUTANEOUS
Status: DISCONTINUED | OUTPATIENT
Start: 2024-11-23 | End: 2024-11-29 | Stop reason: HOSPADM

## 2024-11-23 RX ORDER — NITROGLYCERIN 0.4 MG/1
0.4 TABLET SUBLINGUAL
Status: DISCONTINUED | OUTPATIENT
Start: 2024-11-23 | End: 2024-11-29 | Stop reason: HOSPADM

## 2024-11-23 RX ORDER — INSULIN GLARGINE 100 [IU]/ML
15 INJECTION, SOLUTION SUBCUTANEOUS
Status: DISCONTINUED | OUTPATIENT
Start: 2024-11-23 | End: 2024-11-27

## 2024-11-23 RX ORDER — BUDESONIDE AND FORMOTEROL FUMARATE DIHYDRATE 160; 4.5 UG/1; UG/1
2 AEROSOL RESPIRATORY (INHALATION) 2 TIMES DAILY
Status: DISCONTINUED | OUTPATIENT
Start: 2024-11-23 | End: 2024-11-29 | Stop reason: HOSPADM

## 2024-11-23 RX ORDER — FUROSEMIDE 10 MG/ML
120 INJECTION INTRAMUSCULAR; INTRAVENOUS ONCE
Status: COMPLETED | OUTPATIENT
Start: 2024-11-23 | End: 2024-11-23

## 2024-11-23 RX ORDER — FAMOTIDINE 20 MG/1
20 TABLET, FILM COATED ORAL 2 TIMES DAILY
Status: DISCONTINUED | OUTPATIENT
Start: 2024-11-23 | End: 2024-11-29 | Stop reason: HOSPADM

## 2024-11-23 RX ORDER — LORATADINE 10 MG/1
10 TABLET ORAL DAILY
Status: DISCONTINUED | OUTPATIENT
Start: 2024-11-24 | End: 2024-11-29 | Stop reason: HOSPADM

## 2024-11-23 RX ORDER — ALBUTEROL SULFATE 90 UG/1
1 INHALANT RESPIRATORY (INHALATION) EVERY 4 HOURS PRN
Status: DISCONTINUED | OUTPATIENT
Start: 2024-11-23 | End: 2024-11-29 | Stop reason: HOSPADM

## 2024-11-23 RX ORDER — MAGNESIUM HYDROXIDE/ALUMINUM HYDROXICE/SIMETHICONE 120; 1200; 1200 MG/30ML; MG/30ML; MG/30ML
15 SUSPENSION ORAL EVERY 4 HOURS PRN
Status: DISCONTINUED | OUTPATIENT
Start: 2024-11-23 | End: 2024-11-29 | Stop reason: HOSPADM

## 2024-11-23 RX ORDER — POTASSIUM CHLORIDE 1500 MG/1
40 TABLET, EXTENDED RELEASE ORAL 3 TIMES DAILY
Status: DISCONTINUED | OUTPATIENT
Start: 2024-11-23 | End: 2024-11-26

## 2024-11-23 RX ORDER — FLUTICASONE PROPIONATE 50 MCG
1 SPRAY, SUSPENSION (ML) NASAL 2 TIMES DAILY
Status: DISCONTINUED | OUTPATIENT
Start: 2024-11-23 | End: 2024-11-29 | Stop reason: HOSPADM

## 2024-11-23 RX ORDER — ACETAMINOPHEN 325 MG/1
650 TABLET ORAL EVERY 6 HOURS PRN
Status: DISCONTINUED | OUTPATIENT
Start: 2024-11-23 | End: 2024-11-29 | Stop reason: HOSPADM

## 2024-11-23 RX ORDER — FUROSEMIDE 10 MG/ML
80 INJECTION INTRAMUSCULAR; INTRAVENOUS ONCE
Status: COMPLETED | OUTPATIENT
Start: 2024-11-23 | End: 2024-11-23

## 2024-11-23 RX ORDER — PREGABALIN 50 MG/1
50 CAPSULE ORAL
Status: DISCONTINUED | OUTPATIENT
Start: 2024-11-23 | End: 2024-11-26

## 2024-11-23 RX ORDER — METOLAZONE 5 MG/1
5 TABLET ORAL 2 TIMES WEEKLY
Status: DISCONTINUED | OUTPATIENT
Start: 2024-11-25 | End: 2024-11-27

## 2024-11-23 RX ORDER — METOPROLOL SUCCINATE 50 MG/1
50 TABLET, EXTENDED RELEASE ORAL DAILY
Status: DISCONTINUED | OUTPATIENT
Start: 2024-11-24 | End: 2024-11-29 | Stop reason: HOSPADM

## 2024-11-23 RX ORDER — ATORVASTATIN CALCIUM 10 MG/1
10 TABLET, FILM COATED ORAL DAILY
Status: DISCONTINUED | OUTPATIENT
Start: 2024-11-24 | End: 2024-11-29 | Stop reason: HOSPADM

## 2024-11-23 RX ORDER — MONTELUKAST SODIUM 10 MG/1
10 TABLET ORAL
Status: DISCONTINUED | OUTPATIENT
Start: 2024-11-23 | End: 2024-11-29 | Stop reason: HOSPADM

## 2024-11-23 RX ADMIN — FUROSEMIDE 80 MG: 10 INJECTION, SOLUTION INTRAMUSCULAR; INTRAVENOUS at 15:08

## 2024-11-23 RX ADMIN — MONTELUKAST 10 MG: 10 TABLET, FILM COATED ORAL at 22:20

## 2024-11-23 RX ADMIN — INSULIN GLARGINE 15 UNITS: 100 INJECTION, SOLUTION SUBCUTANEOUS at 22:20

## 2024-11-23 RX ADMIN — POTASSIUM CHLORIDE 40 MEQ: 1500 TABLET, EXTENDED RELEASE ORAL at 22:20

## 2024-11-23 RX ADMIN — PREGABALIN 50 MG: 50 CAPSULE ORAL at 22:20

## 2024-11-23 RX ADMIN — FUROSEMIDE 120 MG: 10 INJECTION, SOLUTION INTRAMUSCULAR; INTRAVENOUS at 17:44

## 2024-11-23 RX ADMIN — APIXABAN 5 MG: 5 TABLET, FILM COATED ORAL at 17:44

## 2024-11-23 RX ADMIN — FAMOTIDINE 20 MG: 20 TABLET, FILM COATED ORAL at 17:44

## 2024-11-23 RX ADMIN — POTASSIUM CHLORIDE 40 MEQ: 1500 TABLET, EXTENDED RELEASE ORAL at 17:44

## 2024-11-23 NOTE — ED ATTENDING ATTESTATION
11/23/2024  I, Sharon Samuel MD, saw and evaluated the patient. I have discussed the patient with the resident/non-physician practitioner and agree with the resident's/non-physician practitioner's findings, Plan of Care, and MDM as documented in the resident's/non-physician practitioner's note, except where noted. All available labs and Radiology studies were reviewed.  I was present for key portions of any procedure(s) performed by the resident/non-physician practitioner and I was immediately available to provide assistance.       At this point I agree with the current assessment done in the Emergency Department.  I have conducted an independent evaluation of this patient a history and physical is as follows:    OA: 60 y/o m with h/o CHF, HTN, HLD, Atrial fibrillation, CKD who presents with SOB, increased sensation of fluid overload in his abdomen and chest pain. Sxms have been ongoing since last night. Notes swelling of his legs and belly, + weight gain. Sxms similar to previous episodes, most recent admission in October. Otherwise the pt denies any new cough, congestions, fevers/chills, lightheadedness, dizziness. Compliance with bumex. PE, NAD, VSS,NC/AT, MMM, neck supple/FROM, RR, decreased BS at bases but without rhonchi/wheezing, no respiratory distress, abd soft, NT/ND, +BS, -r/g, + 1 pitting edema b/l LE, -calf ttp, intact pulses, capillary refill < 2 sec, AAO. A/p SOB and weight gain, sxms similar to previous CHF exacerbations. Eval with labs, imaging, monitor, treat accordingly. Likely admission.          ED Course         Critical Care Time  Procedures

## 2024-11-23 NOTE — ASSESSMENT & PLAN NOTE
"Lab Results   Component Value Date    HGBA1C 8.3 (H) 06/19/2024       No results for input(s): \"POCGLU\" in the last 72 hours.    Blood Sugar Average: Last 72 hrs:    Diabetes mellitus type 2 uncontrolled  Hold sitagliptin while inpatient  Will have him on Lantus daily  Titrate insulin dose based on Accu-Cheks  Outpatient endocrinology follow-up for better diabetes control encouraged  Monitor Accu-Cheks  Avoidable glycemia    "

## 2024-11-23 NOTE — ED PROVIDER NOTES
Time reflects when diagnosis was documented in both MDM as applicable and the Disposition within this note       Time User Action Codes Description Comment    11/23/2024  4:29 PM Arina Bowie Add [E61.1] Iron deficiency     11/23/2024  4:29 PM Arina Bowie Remove [E61.1] Iron deficiency     11/23/2024  4:29 PM Arina Bowie Add [I50.33] Acute on chronic diastolic congestive heart failure (HCC)           ED Disposition       ED Disposition   Admit    Condition   Stable    Date/Time   Sat Nov 23, 2024  4:30 PM    Comment                  Assessment & Plan       Medical Decision Making  Amount and/or Complexity of Data Reviewed  Labs: ordered. Decision-making details documented in ED Course.  Radiology: ordered.    Risk  Prescription drug management.  Decision regarding hospitalization.        ED Course as of 11/23/24 2329   Sat Nov 23, 2024   1500 Patient seen and evaluated by me  DDx: CHF, ACS, CKD, liver disease.  Doubt intra-abdominal pathology given abdominal exam overall benign and symptoms patient is experiencing are typical of his CHF exacerbation.  Have considered PE/DVT, but only risk factor is immobility which is patient's baseline.  He has no signs of PE and leg symptoms are symmetric.   1506 EKG done at 1506 interpreted by me   rate 76  rhythm normal sinus  axis normal  QRS complexes are normal  Intervals are normal  ST segments showing no ischemic changes     1533 CBC and differential(!)  Baseline anemia, otherwise within normal limits.   1559 BNP(!): 254  Similar to baseline/previous measures   1559 hs TnI 0hr: 13   1600 Comprehensive metabolic panel(!)  Hyperglycemia. Kidney function normal.   1621 Mild vascular congestion on CXR. Plan to admit for chf exacerbation.   1700 Admitted to [facility]       Medications   Empagliflozin (JARDIANCE) tablet 10 mg (has no administration in time range)   fluticasone (FLONASE) 50 mcg/act nasal spray 1 spray (has no administration in time range)   atorvastatin  (LIPITOR) tablet 10 mg (has no administration in time range)   budesonide-formoterol (SYMBICORT) 160-4.5 mcg/act inhaler 2 puff (has no administration in time range)   acetaminophen (TYLENOL) tablet 650 mg (has no administration in time range)   metoprolol succinate (TOPROL-XL) 24 hr tablet 50 mg (has no administration in time range)   nitroglycerin (NITROSTAT) SL tablet 0.4 mg (has no administration in time range)   apixaban (ELIQUIS) tablet 5 mg (5 mg Oral Given 11/23/24 1744)   pregabalin (LYRICA) capsule 50 mg (has no administration in time range)   albuterol (PROVENTIL HFA,VENTOLIN HFA) inhaler 1 puff (has no administration in time range)   loratadine (CLARITIN) tablet 10 mg (has no administration in time range)   metolazone (ZAROXOLYN) tablet 5 mg (has no administration in time range)   famotidine (PEPCID) tablet 20 mg (20 mg Oral Given 11/23/24 1744)   potassium chloride (Klor-Con M20) CR tablet 40 mEq (40 mEq Oral Given 11/23/24 1744)   cyanocobalamin (VITAMIN B-12) tablet 1,000 mcg (has no administration in time range)   aluminum-magnesium hydroxide-simethicone (MAALOX) oral suspension 15 mL (has no administration in time range)   montelukast (SINGULAIR) tablet 10 mg (has no administration in time range)   umeclidinium 62.5 mcg/actuation inhaler AEPB 1 puff (has no administration in time range)   polyethylene glycol (MIRALAX) packet 17 g (has no administration in time range)   insulin glargine (LANTUS) subcutaneous injection 15 Units 0.15 mL (has no administration in time range)   insulin lispro (HumALOG/ADMELOG) 100 units/mL subcutaneous injection 1-5 Units ( Subcutaneous Not Given 11/23/24 1743)   furosemide (LASIX) injection 80 mg (80 mg Intravenous Given 11/23/24 1508)   furosemide (LASIX) injection 120 mg (120 mg Intravenous Given 11/23/24 1744)       ED Risk Strat Scores                                               History of Present Illness       Chief Complaint   Patient presents with    Shortness  of Breath     Patient with hx CHF reports exacerbation that began last night with abdominal fullness, swelling in legs, and SOB on exertion. Denies CP.       Past Medical History:   Diagnosis Date    Acute gastritis without hemorrhage     last assessed 3/24/17    Asthma     Atrial fibrillation (HCC)     Bilateral leg edema 02/19/2020    CHF (congestive heart failure) (HCC)     Coronary artery disease     Daytime sleepiness 07/17/2019    Diabetes mellitus (HCC)     Dyspnea on exertion 10/11/2021    Edema of both feet 01/23/2020    Gastric bypass status for obesity     Hyperlipidemia     Hypertension     Hypokalemia 11/14/2021    Impaired fasting glucose     last assessed 5/10/17    Knee sprain, bilateral 06/14/2018    Leg cramps 06/16/2022    Obesity     Viral gastroenteritis     last assessed 11/4/16      Past Surgical History:   Procedure Laterality Date    CARDIAC CATHETERIZATION N/A 3/9/2022    Procedure: CARDIAC RHC W/ PRESSURE SENSOR;  Surgeon: Aminata Wilcox MD;  Location: [facility];  Service: Cardiology    CARDIAC CATHETERIZATION N/A 9/6/2022    Procedure: Cardiac catheterization;  Surgeon: Yolanda Del Rosario DO;  Location: [facility];  Service: Cardiology    CARDIAC CATHETERIZATION N/A 9/6/2022    Procedure: Cardiac Coronary Angiogram;  Surgeon: Yolanda Del Rosario DO;  [facility]: [facility];  Service: Cardiology    CARDIAC CATHETERIZATION N/A 10/11/2023    Procedure: Cardiac RHC;  Surgeon: Yoel Darden MD;  Location: [facility];  Service: Cardiology    CARPAL TUNNEL RELEASE      bilateral    CHOLECYSTECTOMY LAPAROSCOPIC N/A 2/7/2024    Procedure: CHOLECYSTECTOMY LAPAROSCOPIC;  Surgeon: Nena Ferguson MD;  Location: [facility];  Service: General    GASTRIC BYPASS  2004    with han en y    HERNIA REPAIR      ventral inscisional    IR BIOPSY BONE MARROW  12/14/2023    LAPAROSCOPIC TRANSGASTRIC ACCESS FOR ERCP N/A 2/7/2024    Procedure: LAPAROSCOPIC TRANSGASTRIC  ACCESS FOR ERCP;  Surgeon: Nena Ferguson MD;  Location: [facility];  Service: General    LAPAROSCOPIC TRANSGASTRIC ACCESS FOR ERCP N/A 2/7/2024    Procedure: ERCP, sphincterotomy, stone extraction;  Surgeon: Rey Ferguson MD;  Location: [facility];  Service: Gastroenterology    TONSILLECTOMY        Family History   Problem Relation Age of Onset    Stroke Mother     Hypertension Father     Cancer Father     COPD Father       Social History     Tobacco Use    Smoking status: Former     Current packs/day: 1.00     Average packs/day: 1 pack/day for 5.0 years (5.0 ttl pk-yrs)     Types: Pipe, Cigars, Cigarettes     Start date: 2016     Quit date: 1/1/2021     Passive exposure: Never    Smokeless tobacco: Former    Tobacco comments:     cigar once a day   Vaping Use    Vaping status: Never Used   Substance Use Topics    Alcohol use: Yes     Alcohol/week: 2.0 standard drinks of alcohol     Types: 2 Shots of liquor per week     Comment: social    Drug use: No      E-Cigarette/Vaping    E-Cigarette Use Never User       E-Cigarette/Vaping Substances    Nicotine No     THC No     CBD No     Flavoring No     Other No     Unknown No       I have reviewed and agree with the history as documented.     Patient is a 59-year-old male, past medical history significant for atrial fibrillation, CHF, CAD, diabetes, hyperlipidemia, hypertension, presenting today for concern for CHF exacerbation.  Patient states that last night he began with increased dyspnea and centralized chest pain radiating to his back as well as abdominal fullness and discomfort.  He also states that his legs have been swelling up over the past 24 hours.  He states he has a 11 pound weight gain during this time.  He denies any recent illnesses, he denies any recent dietary exacerbating changes such as increased fluid intake or salt intake.  He denies any exertional component or change in exercise tolerance. No cough. Patient denies leg pain, leg swelling,  recent travel,history or cancer, hemoptysis, of history of PE/DVT.  He is generally immobile at baseline, and has had a hospitalization approximately 6 weeks ago.  He is on anticoagulation, and reports compliance.  Patient reports that he took his Bumex this morning, and he usually urinates soon after, but unfortunately he only urinated a small amount today.            Review of Systems   Constitutional:  Positive for unexpected weight change. Negative for chills, fatigue and fever.   HENT:  Negative for congestion.    Respiratory:  Positive for shortness of breath. Negative for cough and chest tightness.    Cardiovascular:  Positive for chest pain and leg swelling.   Gastrointestinal:  Positive for abdominal distention. Negative for abdominal pain, diarrhea, nausea and vomiting.   Genitourinary:  Positive for decreased urine volume.   Skin:  Negative for color change.   Neurological:  Negative for dizziness, syncope, weakness, numbness and headaches.           Objective       ED Triage Vitals [11/23/24 1444]   Temperature Pulse Blood Pressure Respirations SpO2 Patient Position - Orthostatic VS   97.5 °F (36.4 °C) 81 127/78 18 97 % Sitting      Temp Source Heart Rate Source BP Location FiO2 (%) Pain Score    Oral Monitor Right arm -- No Pain      Vitals      Date and Time Temp Pulse SpO2 Resp BP Pain Score FACES Pain Rating User   11/23/24 2135 98.2 °F (36.8 °C) 84 99 % -- 139/74 -- -- SBL   11/23/24 2100 -- -- -- -- -- No Pain -- TA   11/23/24 1740 -- 75 99 % 12 122/68 No Pain -- HB   11/23/24 1444 97.5 °F (36.4 °C) 81 97 % 18 127/78 No Pain -- HB            Physical Exam  Vitals and nursing note reviewed.   Constitutional:       General: He is not in acute distress.     Appearance: Normal appearance. He is not ill-appearing or toxic-appearing.   HENT:      Head: Normocephalic and atraumatic.   Eyes:      General: No scleral icterus.     Extraocular Movements: Extraocular movements intact.   Cardiovascular:       Rate and Rhythm: Normal rate and regular rhythm.      Pulses: Normal pulses.      Heart sounds: Normal heart sounds. No murmur heard.  Pulmonary:      Effort: Pulmonary effort is normal. No respiratory distress.      Breath sounds: Rales (minimal rales in the BL bases) present. No wheezing or rhonchi.   Abdominal:      General: Abdomen is flat. There is distension.      Palpations: Abdomen is soft.      Tenderness: There is abdominal tenderness (Minimally tender diffusely).   Musculoskeletal:         General: Normal range of motion.      Cervical back: Normal range of motion.      Right lower leg: Edema (Nonpitting) present.      Left lower leg: Edema (Nonpitting) present.   Skin:     Capillary Refill: Capillary refill takes less than 2 seconds.   Neurological:      General: No focal deficit present.      Mental Status: He is alert.      Cranial Nerves: No cranial nerve deficit.      Sensory: No sensory deficit.      Motor: No weakness.      Gait: Gait normal.   Psychiatric:         Mood and Affect: Mood normal.         Behavior: Behavior normal.         Results Reviewed       Procedure Component Value Units Date/Time    Fingerstick Glucose (POCT) [[id number]]  (Abnormal) Collected: 11/23/24 2047    Lab Status: Final result Specimen: Blood Updated: 11/23/24 2048     POC Glucose 180 mg/dl     HS Troponin I 2hr [[id number]]  (Normal) Collected: 11/23/24 1736    Lab Status: Final result Specimen: Blood from Arm, Left Updated: 11/23/24 1805     hs TnI 2hr 12 ng/L      Delta 2hr hsTnI -1 ng/L     Fingerstick Glucose (POCT) [[id number]]  (Abnormal) Collected: 11/23/24 1743    Lab Status: Final result Specimen: Blood Updated: 11/23/24 1744     POC Glucose 142 mg/dl     Hemoglobin A1C [[id number]]  (Abnormal) Collected: 11/23/24 1508    Lab Status: Final result Specimen: Blood from Arm, Left Updated: 11/23/24 1712     Hemoglobin A1C 7.3 %       mg/dl     HS Troponin 0hr (reflex protocol) [[id number]]  (Normal) Collected:  11/23/24 1508    Lab Status: Final result Specimen: Blood from Arm, Left Updated: 11/23/24 1549     hs TnI 0hr 13 ng/L     B-Type Natriuretic Peptide(BNP) [[id number]]  (Abnormal) Collected: 11/23/24 1508    Lab Status: Final result Specimen: Blood from Arm, Left Updated: 11/23/24 1549      pg/mL     Comprehensive metabolic panel [[id number]]  (Abnormal) Collected: 11/23/24 1508    Lab Status: Final result Specimen: Blood from Arm, Left Updated: 11/23/24 1545     Sodium 137 mmol/L      Potassium 3.9 mmol/L      Chloride 101 mmol/L      CO2 21 mmol/L      ANION GAP 15 mmol/L      BUN 21 mg/dL      Creatinine 1.27 mg/dL      Glucose 151 mg/dL      Calcium 7.8 mg/dL      AST 10 U/L      ALT 5 U/L      Alkaline Phosphatase 108 U/L      Total Protein 6.6 g/dL      Albumin 3.8 g/dL      Total Bilirubin 0.46 mg/dL      eGFR 62 ml/min/1.73sq m     Narrative:      [employer] guidelines for Chronic Kidney Disease (CKD):     Stage 1 with normal or high GFR (GFR > 90 mL/min/1.73 square meters)    Stage 2 Mild CKD (GFR = 60-89 mL/min/1.73 square meters)    Stage 3A Moderate CKD (GFR = 45-59 mL/min/1.73 square meters)    Stage 3B Moderate CKD (GFR = 30-44 mL/min/1.73 square meters)    Stage 4 Severe CKD (GFR = 15-29 mL/min/1.73 square meters)    Stage 5 End Stage CKD (GFR <15 mL/min/1.73 square meters)  Note: GFR calculation is accurate only with a steady state creatinine    CBC and differential [[id number]]  (Abnormal) Collected: 11/23/24 1508    Lab Status: Final result Specimen: Blood from Arm, Left Updated: 11/23/24 1523     WBC 8.54 Thousand/uL      RBC 4.17 Million/uL      Hemoglobin 11.8 g/dL      Hematocrit 36.8 %      MCV 88 fL      MCH 28.3 pg      MCHC 32.1 g/dL      RDW 16.7 %      MPV 9.9 fL      Platelets 238 Thousands/uL      nRBC 0 /100 WBCs      Segmented % 77 %      Immature Grans % 1 %      Lymphocytes % 10 %      Monocytes % 7 %      Eosinophils Relative 4 %      Basophils Relative  1 %      Absolute Neutrophils 6.55 Thousands/µL      Absolute Immature Grans 0.07 Thousand/uL      Absolute Lymphocytes 0.89 Thousands/µL      Absolute Monocytes 0.60 Thousand/µL      Eosinophils Absolute 0.38 Thousand/µL      Basophils Absolute 0.05 Thousands/µL             XR chest portable   Final Interpretation by Carrie Marie MD (11/23 1839)      No acute cardiopulmonary disease.            Workstation performed: [id number]             Procedures    ED Medication and Procedure Management   Prior to Admission Medications   Prescriptions Last Dose Informant Patient Reported? Taking?   Accu-Chek FastClix Lancets MISC  Self No No   Sig: Test blood sugar twice daily   Patient taking differently: Takes blood sugar three times a week provider aware   Blood Glucose Monitoring Suppl (Accu-Chek Guide) w/Device KIT  Self No No   Sig: Use 2 (two) times a day Test blood sugar twice daily   EPINEPHrine (EPIPEN) 0.3 mg/0.3 mL SOAJ  Self No No   Sig: Inject 0.3 mL (0.3 mg total) into a muscle once for 1 dose   Eliquis 5 MG  Self No No   Sig: TAKE 1 TABLET BY MOUTH TWICE A DAY   Empagliflozin (JARDIANCE) 10 MG TABS tablet  Self No No   Sig: Take 1 tablet (10 mg total) by mouth daily   acetaminophen (TYLENOL) 325 mg tablet  Self No No   Sig: Take 2 tablets (650 mg total) by mouth every 6 (six) hours as needed for mild pain, headaches or fever   albuterol (PROVENTIL HFA,VENTOLIN HFA) 90 mcg/act inhaler  Self No No   Sig: INHALE 2 PUFFS EVERY 4 HOURS AS NEEDED FOR WHEEZING OR SHORTNESS OF BREATH   aluminum-magnesium hydroxide 200-200 MG/5ML suspension Unknown Self No No   Sig: Take 5 mL by mouth every 6 (six) hours as needed for heartburn   atorvastatin (LIPITOR) 10 mg tablet  Self No No   Sig: TAKE 1 TABLET BY MOUTH EVERY DAY   budesonide-formoterol (Symbicort) 160-4.5 mcg/act inhaler  Self No No   Sig: Inhale 2 puffs 2 (two) times a day Rinse mouth after use.   bumetanide (BUMEX) 2 mg tablet  Self No No   Sig: Take 3  tablets (6 mg total) by mouth 3 (three) times a day   cyanocobalamin (VITAMIN B-12) 1000 MCG tablet  Self No No   Sig: Take 1 tablet (1,000 mcg total) by mouth daily   famotidine (PEPCID) 20 mg tablet  Self No No   Sig: Take 1 tablet (20 mg total) by mouth if needed for heartburn As needed twice a day   fluticasone (FLONASE) 50 mcg/act nasal spray  Self No No   Si spray into each nostril 2 (two) times a day   loratadine (CLARITIN) 10 mg tablet  Self No No   Sig: Take 1 tablet (10 mg total) by mouth if needed for allergies As needed daily   metolazone (ZAROXOLYN) 2.5 mg tablet  Self No No   Sig: Take 2 tablets (5 mg total) by mouth 2 (two) times a week Take 20-30 minutes before Bumex dose and with additional potassium   metoprolol succinate (TOPROL-XL) 50 mg 24 hr tablet  Self No No   Sig: Take 1 tablet (50 mg total) by mouth daily   montelukast (SINGULAIR) 10 mg tablet  Self No No   Sig: Take 1 tablet (10 mg total) by mouth daily at bedtime   nitroglycerin (NITROSTAT) 0.4 mg SL tablet  Self No No   Sig: Place 1 tablet (0.4 mg total) under the tongue every 5 (five) minutes as needed for chest pain for up to 50 doses   potassium chloride (Klor-Con M20) 20 mEq tablet  Self No No   Sig: Take 2 tablets (40 mEq total) by mouth 3 (three) times a day   pregabalin (LYRICA) 50 mg capsule  Self No No   Sig: Take 1 caps. at bedtime   sitaGLIPtin (Januvia) 100 mg tablet  Self No No   Sig: TAKE 1/2 TABLET BY MOUTH EVERY DAY   tiotropium (Spiriva Respimat) 1.25 MCG/ACT AERS inhaler  Self No No   Sig: Inhale 2 puffs daily      Facility-Administered Medications: None     Current Discharge Medication List        CONTINUE these medications which have NOT CHANGED    Details   Accu-Chek FastClix Lancets MISC Test blood sugar twice daily  Qty: 200 each, Refills: 3    Associated Diagnoses: Type 2 diabetes mellitus with hyperglycemia, without long-term current use of insulin (HCC)      acetaminophen (TYLENOL) 325 mg tablet Take 2  tablets (650 mg total) by mouth every 6 (six) hours as needed for mild pain, headaches or fever    Associated Diagnoses: Acute gallstone pancreatitis; Choledocholithiasis      albuterol (PROVENTIL HFA,VENTOLIN HFA) 90 mcg/act inhaler INHALE 2 PUFFS EVERY 4 HOURS AS NEEDED FOR WHEEZING OR SHORTNESS OF BREATH  Qty: 18 g, Refills: 5    Comments: Substitution to a formulary equivalent within the same pharmaceutical class is authorized.  Associated Diagnoses: Mild intermittent asthma without complication      aluminum-magnesium hydroxide 200-200 MG/5ML suspension Take 5 mL by mouth every 6 (six) hours as needed for heartburn  Qty: 355 mL, Refills: 0    Associated Diagnoses: Chest pain      atorvastatin (LIPITOR) 10 mg tablet TAKE 1 TABLET BY MOUTH EVERY DAY  Qty: 90 tablet, Refills: 3    Associated Diagnoses: Mixed hyperlipidemia      Blood Glucose Monitoring Suppl (Accu-Chek Guide) w/Device KIT Use 2 (two) times a day Test blood sugar twice daily  Qty: 1 kit, Refills: 0    Associated Diagnoses: Type 2 diabetes mellitus with hyperglycemia, without long-term current use of insulin ([facility])      budesonide-formoterol (Symbicort) 160-4.5 mcg/act inhaler Inhale 2 puffs 2 (two) times a day Rinse mouth after use.  Qty: 30.6 g, Refills: 2    Associated Diagnoses: Moderate persistent asthma with acute exacerbation      bumetanide (BUMEX) 2 mg tablet Take 3 tablets (6 mg total) by mouth 3 (three) times a day  Qty: 270 tablet, Refills: 0    Associated Diagnoses: Chronic heart failure with preserved ejection fraction ([facility])      cyanocobalamin (VITAMIN B-12) 1000 MCG tablet Take 1 tablet (1,000 mcg total) by mouth daily  Qty: 30 tablet, Refills: 0    Associated Diagnoses: Vitamin B12 deficiency      Eliquis 5 MG TAKE 1 TABLET BY MOUTH TWICE A DAY  Qty: 60 tablet, Refills: 5    Associated Diagnoses: A-fib ([facility]); Atrial fibrillation, unspecified type ([facility])      Empagliflozin (JARDIANCE) 10 MG TABS tablet Take 1 tablet (10 mg total) by  mouth daily  Qty: 30 tablet, Refills: 11    Associated Diagnoses: Acute on chronic right-sided heart failure (HCC); Type 2 diabetes mellitus with stage 3b chronic kidney disease, without long-term current use of insulin ([facility])      EPINEPHrine (EPIPEN) 0.3 mg/0.3 mL SOAJ Inject 0.3 mL (0.3 mg total) into a muscle once for 1 dose  Qty: 0.6 mL, Refills: 0    Associated Diagnoses: Anaphylaxis, initial encounter      famotidine (PEPCID) 20 mg tablet Take 1 tablet (20 mg total) by mouth if needed for heartburn As needed twice a day    Associated Diagnoses: Chest pain      fluticasone (FLONASE) 50 mcg/act nasal spray 1 spray into each nostril 2 (two) times a day  Refills: 0    Associated Diagnoses: Nasal congestion      loratadine (CLARITIN) 10 mg tablet Take 1 tablet (10 mg total) by mouth if needed for allergies As needed daily    Associated Diagnoses: Nasal congestion      metolazone (ZAROXOLYN) 2.5 mg tablet Take 2 tablets (5 mg total) by mouth 2 (two) times a week Take 20-30 minutes before Bumex dose and with additional potassium  Qty: 180 tablet, Refills: 0    Associated Diagnoses: Chronic heart failure with preserved ejection fraction ([facility])      metoprolol succinate (TOPROL-XL) 50 mg 24 hr tablet Take 1 tablet (50 mg total) by mouth daily  Qty: 30 tablet, Refills: 5    Associated Diagnoses: Chronic heart failure with preserved ejection fraction ([facility])      montelukast (SINGULAIR) 10 mg tablet Take 1 tablet (10 mg total) by mouth daily at bedtime  Qty: 90 tablet, Refills: 1    Associated Diagnoses: Moderate persistent asthma with acute exacerbation      nitroglycerin (NITROSTAT) 0.4 mg SL tablet Place 1 tablet (0.4 mg total) under the tongue every 5 (five) minutes as needed for chest pain for up to 50 doses  Qty: 50 tablet, Refills: 0    Associated Diagnoses: Chest pain      potassium chloride (Klor-Con M20) 20 mEq tablet Take 2 tablets (40 mEq total) by mouth 3 (three) times a day  Qty: 180 tablet, Refills: 0     Associated Diagnoses: Diuretic-induced hypokalemia      pregabalin (LYRICA) 50 mg capsule Take 1 caps. at bedtime  Qty: 30 capsule, Refills: 5    Associated Diagnoses: Diabetic polyneuropathy associated with type 2 diabetes mellitus ([facility])      sitaGLIPtin (Januvia) 100 mg tablet TAKE 1/2 TABLET BY MOUTH EVERY DAY  Qty: 15 tablet, Refills: 5    Associated Diagnoses: Type 2 diabetes mellitus with stage 3b chronic kidney disease, without long-term current use of insulin ([facility])      tiotropium (Spiriva Respimat) 1.25 MCG/ACT AERS inhaler Inhale 2 puffs daily  Qty: 4 g, Refills: 0    Associated Diagnoses: Moderate persistent asthma with acute exacerbation           No discharge procedures on file.  ED SEPSIS DOCUMENTATION   Time reflects when diagnosis was documented in both MDM as applicable and the Disposition within this note       Time User Action Codes Description Comment    11/23/2024  4:29 PM Arina Bowie Add [E61.1] Iron deficiency     11/23/2024  4:29 PM Arina Bowie Remove [E61.1] Iron deficiency     11/23/2024  4:29 PM Arina Bowie Add [I50.33] Acute on chronic diastolic congestive heart failure (HCC)                  Arina Bowie MD  11/23/24 4116

## 2024-11-23 NOTE — TELEPHONE ENCOUNTER
"Reason for Disposition   MODERATE difficulty breathing (e.g., speaks in phrases, SOB even at rest, pulse 100-120)    Answer Assessment - Initial Assessment Questions  1. MAIN CONCERN OR SYMPTOM: \"What is your main concern right now?\" \"What's the main symptom you're worried about?\" (e.g., breathing difficulty, ankle swelling, weight gain).      SOB, swelling of lower legs and abdomen  2. ONSET: \"When did the  swelling and SOB  start?\"      This morning  3. BREATHING DIFFICULTY: \"Are you having any difficulty breathing?\" If Yes, ask: \"How bad is it?\"  (e.g., none, mild, moderate, severe)  \"Is this worse than usual for you?\"      Moderate  4. EDEMA - FOOT-LEG SWELLING: \"Do you have swelling of your ankles, feet or legs?\" If Yes, ask: \"How bad is the swelling?\" (e.g., localized; mild, moderate, severe)      Swelling lower legs,feet,abdomena dn lower pain he feels it in.  5. WEIGHT - CURRENT: \"What is your weight today?\"      330lbs.  6. WEIGHT - TARGET RANGE: \"Do you try to keep your weight in a target (goal) range?\" If Yes, ask: \"What is that range?\"      319lbs.  7. WEIGHT - CHANGE: \"Have you gained (or lost) weight in the past 24 hours? Past week (7 days)?\" If Yes, ask:  \"How much weight?\"      Weight is up 11lbs.today.  8. OTHER SYMPTOMS: \"Do you have any other symptoms?\" (e.g., depression, weakness or fatigue, abdomen bloating, hacky cough)      Not voiding more.  9. DIURETICS: \"Are you currently taking water pills?\" (e.g., furosemide [Lasix], hydrochlorothiazide [HCTZ], bumetanide [Bumex], metolazone [Zaroxolyn]) If Yes, ask: \"What medicine are you taking, and how often?\"  \"Any recent change in dose?\"       Metolazone taken at 0700  10. O2 SATURATION MONITOR: \"Do you use an oxygen saturation monitor (pulse oximeter) at home?\" If Yes, ask: \"What is your reading (oxygen level) today?\" \"What is your usual oxygen saturation reading?\" (e.g., 95%)        Does not have one.   Cardiomems- 18 yesterday, 16 elevated all " "this week.  11. HEART FAILURE HCP: \"Who treats your heart failure?\"  (e.g., cardiologist or heart specialist, heart failure clinic or center, primary care doctor)        CHF -Dr. Wilcox  12. FLUID and SODIUM RESTRICTIONS: \"Have you been instructed to restrict your daily fluid intake or sodium (salt) intake?\" If Yes, ask: \"What are your daily limits?\"        2000 ml fluid daliy, No Sodium    Protocols used: Heart Failure on Treatment Follow-up Call-[facility]-    "

## 2024-11-23 NOTE — ASSESSMENT & PLAN NOTE
Wt Readings from Last 3 Encounters:   10/29/24 (!) 145 kg (319 lb)   10/10/24 (!) 145 kg (319 lb)   10/03/24 (!) 150 kg (331 lb 11.2 oz)       Acute on chronic diastolic congestive heart failure  Presents with worsening shortness of breath lower extremity edema abdominal fullness and distention  Reports medication compliance, reports compliance with dietary restrictions of fluid and salt intake  Patient has a cardiac sensor and was told by his heart failure RN pressure is increased to 20  He is on Bumex 6 mg 3 times daily as well as metolazone twice weekly  Will likely need Bumex gtt.  IV Lasix 120 mg x 1 per heart failure protocol  Continue metoprolol  Empagliflozin 10 mg daily  Monitor and replete electrolytes  Cardiology following  Low-salt diet

## 2024-11-23 NOTE — ASSESSMENT & PLAN NOTE
Patient reports his CPAP machine is on recall.  Outpatient sleep study encouraged  CPAP compliance encouraged

## 2024-11-23 NOTE — TELEPHONE ENCOUNTER
On Call agreed with Er disposition. To [facility] now. Patient states that his family is not homoe so he will call an ambulance to take him now.

## 2024-11-23 NOTE — H&P
"H&P - Hospitalist   Name: Mesfin Cano 59 y.o. male I MRN: [id number]  Unit/Bed#: ED 18 I Date of Admission: 11/23/2024   Date of Service: 11/23/2024 I Hospital Day: 0     Assessment & Plan  Acute on chronic diastolic congestive heart failure (HCC)  Wt Readings from Last 3 Encounters:   10/29/24 (!) 145 kg (319 lb)   10/10/24 (!) 145 kg (319 lb)   10/03/24 (!) 150 kg (331 lb 11.2 oz)       Acute on chronic diastolic congestive heart failure  Presents with worsening shortness of breath lower extremity edema abdominal fullness and distention  Reports medication compliance, reports compliance with dietary restrictions of fluid and salt intake  Patient has a cardiac sensor and was told by his heart failure RN pressure is increased to 20  He is on Bumex 6 mg 3 times daily as well as metolazone twice weekly  Will likely need Bumex gtt.  IV Lasix 120 mg x 1 per heart failure protocol  Continue metoprolol  Empagliflozin 10 mg daily  Monitor and replete electrolytes  Cardiology following  Low-salt diet      Morbid obesity (HCC)  Therapeutic lifestyle modification encouraged  VICKY (obstructive sleep apnea)  Patient reports his CPAP machine is on recall.  Outpatient sleep study encouraged  CPAP compliance encouraged  Paroxysmal atrial fibrillation (HCC)  Continue metoprolol XL 50 mg p.o. daily  Eliquis anticoagulation  Uncontrolled type 2 diabetes mellitus with hyperglycemia (HCC)  Lab Results   Component Value Date    HGBA1C 8.3 (H) 06/19/2024       No results for input(s): \"POCGLU\" in the last 72 hours.    Blood Sugar Average: Last 72 hrs:    Diabetes mellitus type 2 uncontrolled  Hold sitagliptin while inpatient  Will have him on Lantus daily  Titrate insulin dose based on Accu-Cheks  Outpatient endocrinology follow-up for better diabetes control encouraged  Monitor Accu-Cheks  Avoidable glycemia        VTE Pharmacologic Prophylaxis: VTE Score: 4 Moderate Risk (Score 3-4) - Pharmacological DVT Prophylaxis Ordered: apixaban " (Eliquis).  Code Status: Level 1 - Full Code     Discussion with family:  Discussed with the patient, reports he is keeping his family updated.     Anticipated Length of Stay: Patient will be admitted on an inpatient basis with an anticipated length of stay of greater than 2 midnights secondary to Ac on Ch CHF for management as outlined.    History of Present Illness   Chief Complaint:     Worsening shortness of breath lower extremity swelling    Mesfin Cano is a 59 y.o. male with a PMH of obesity, diabetes mellitus type 2, paroxysmal atrial fibrillation, chronic diastolic congestive heart failure, obstructive sleep apnea who presents with worsening shortness of breath lower extremity swelling weight gain.  Reports symptoms worsened since yesterday, he has noticed worsening lower extremity swelling abdominal distention.  He has a cardiac pressure sensor and was reported by his RN he is pressure is increased.  He also reports some chest discomfort.  No history suggestive of orthopnea or PND no history suggestive diaphoresis palpitations.  Reports fatigue.    No history of fever chills sweats constitutional symptoms.  Denies abdominal pain nausea vomiting diarrhea.  Reports decreased urine output.  Denies dysuria increased frequency flank pain.  No history of hematemesis melena hematochezia.  No history of cough chest tightness wheezing.    Reports medication compliance.  Reports compliance with dietary restrictions of fluid and salt intake.    Outpatient notes reviewed in epic  Prior hospitalization reviewed in epic        Review of Systems   All other systems reviewed and are negative.      Historical Information   Past Medical History:   Diagnosis Date    Acute gastritis without hemorrhage     last assessed 3/24/17    Asthma     Atrial fibrillation (HCC)     Bilateral leg edema 02/19/2020    CHF (congestive heart failure) (HCC)     Coronary artery disease     Daytime sleepiness 07/17/2019    Diabetes mellitus  (HCC)     Dyspnea on exertion 10/11/2021    Edema of both feet 01/23/2020    Gastric bypass status for obesity     Hyperlipidemia     Hypertension     Hypokalemia 11/14/2021    Impaired fasting glucose     last assessed 5/10/17    Knee sprain, bilateral 06/14/2018    Leg cramps 06/16/2022    Obesity     Viral gastroenteritis     last assessed 11/4/16     Past Surgical History:   Procedure Laterality Date    CARDIAC CATHETERIZATION N/A 3/9/2022    Procedure: CARDIAC RHC W/ PRESSURE SENSOR;  Surgeon: Aminata Wilcox MD;  [facility];  Service: Cardiology    CARDIAC CATHETERIZATION N/A 9/6/2022    Procedure: Cardiac catheterization;  Surgeon: Yolanda Del Rosario DO;  [facility];  Service: Cardiology    CARDIAC CATHETERIZATION N/A 9/6/2022    Procedure: Cardiac Coronary Angiogram;  Surgeon: Yolanda Del Rosario DO;  [facility];  Service: Cardiology    CARDIAC CATHETERIZATION N/A 10/11/2023    Procedure: Cardiac RHC;  Surgeon: Yoel Darden MD;  [facility];  Service: Cardiology    CARPAL TUNNEL RELEASE      bilateral    CHOLECYSTECTOMY LAPAROSCOPIC N/A 2/7/2024    Procedure: CHOLECYSTECTOMY LAPAROSCOPIC;  Surgeon: Nena Ferguson MD;  Location: [facility];  Service: General    GASTRIC BYPASS  2004    with han en y    HERNIA REPAIR      ventral inscisional    IR BIOPSY BONE MARROW  12/14/2023    LAPAROSCOPIC TRANSGASTRIC ACCESS FOR ERCP N/A 2/7/2024    Procedure: LAPAROSCOPIC TRANSGASTRIC ACCESS FOR ERCP;  Surgeon: Nena Ferguson MD;  [facility];  Service: General    LAPAROSCOPIC TRANSGASTRIC ACCESS FOR ERCP N/A 2/7/2024    Procedure: ERCP, sphincterotomy, stone extraction;  Surgeon: Rey Ferguson MD;  Location: [facility];  Service: Gastroenterology    TONSILLECTOMY       Social History     Tobacco Use    Smoking status: Former     Current packs/day: 1.00     Average packs/day: 1 pack/day for 5.0 years (5.0 ttl pk-yrs)      Types: Pipe, Cigars, Cigarettes     Start date: 2016     Quit date: 1/1/2021     Passive exposure: Never    Smokeless tobacco: Former    Tobacco comments:     cigar once a day   Vaping Use    Vaping status: Never Used   Substance and Sexual Activity    Alcohol use: Yes     Alcohol/week: 2.0 standard drinks of alcohol     Types: 2 Shots of liquor per week     Comment: social    Drug use: No    Sexual activity: Yes     Partners: Female     Birth control/protection: None     E-Cigarette/Vaping    E-Cigarette Use Never User      E-Cigarette/Vaping Substances    Nicotine No     THC No     CBD No     Flavoring No     Other No     Unknown No      Family History   Problem Relation Age of Onset    Stroke Mother     Hypertension Father     Cancer Father     COPD Father      Social History:  Marital Status: /Civil Union   Occupation:   Patient Pre-hospital Living Situation: Home  Patient Pre-hospital Level of Mobility: walks  Patient Pre-hospital Diet Restrictions: no    Meds/Allergies   I have reviewed home medications with patient personally.  Prior to Admission medications    Medication Sig Start Date End Date Taking? Authorizing Provider   Accu-Chek FastClix Lancets MISC Test blood sugar twice daily  Patient taking differently: Takes blood sugar three times a week provider aware 11/16/21   Soo Chavez MD   acetaminophen (TYLENOL) 325 mg tablet Take 2 tablets (650 mg total) by mouth every 6 (six) hours as needed for mild pain, headaches or fever 2/12/24   Paulino Brown MD   albuterol (PROVENTIL HFA,VENTOLIN HFA) 90 mcg/act inhaler INHALE 2 PUFFS EVERY 4 HOURS AS NEEDED FOR WHEEZING OR SHORTNESS OF BREATH 9/11/24   Soo Chavez MD   aluminum-magnesium hydroxide 200-200 MG/5ML suspension Take 5 mL by mouth every 6 (six) hours as needed for heartburn 6/27/24   Earl Sarkar MD   atorvastatin (LIPITOR) 10 mg tablet TAKE 1 TABLET BY MOUTH EVERY DAY 12/18/23   Aminata Wilcox MD   Blood  Glucose Monitoring Suppl (Accu-Chek Guide) w/Device KIT Use 2 (two) times a day Test blood sugar twice daily 8/5/21   Soo Chavez MD   budesonide-formoterol (Symbicort) 160-4.5 mcg/act inhaler Inhale 2 puffs 2 (two) times a day Rinse mouth after use. 1/11/24   Pedro Finn DO   bumetanide (BUMEX) 2 mg tablet Take 3 tablets (6 mg total) by mouth 3 (three) times a day 10/10/24 11/9/24  Travis Champion MD   cyanocobalamin (VITAMIN B-12) 1000 MCG tablet Take 1 tablet (1,000 mcg total) by mouth daily 10/10/24   Travis Champion MD   Eliquis 5 MG TAKE 1 TABLET BY MOUTH TWICE A DAY 7/7/24   Aminata Wilcox MD   Empagliflozin (JARDIANCE) 10 MG TABS tablet Take 1 tablet (10 mg total) by mouth daily 5/26/23   Aminata Wilcox MD   EPINEPHrine (EPIPEN) 0.3 mg/0.3 mL SOAJ Inject 0.3 mL (0.3 mg total) into a muscle once for 1 dose 2/21/23 11/30/23  Eric Jaquez MD   famotidine (PEPCID) 20 mg tablet Take 1 tablet (20 mg total) by mouth if needed for heartburn As needed twice a day 10/10/24   Travis Champion MD   fluticasone (FLONASE) 50 mcg/act nasal spray 1 spray into each nostril 2 (two) times a day 10/12/23   RODRIGUE Pickens   loratadine (CLARITIN) 10 mg tablet Take 1 tablet (10 mg total) by mouth if needed for allergies As needed daily 10/10/24   Travis Champion MD   metolazone (ZAROXOLYN) 2.5 mg tablet Take 2 tablets (5 mg total) by mouth 2 (two) times a week Take 20-30 minutes before Bumex dose and with additional potassium 10/10/24   Travis Champion MD   metoprolol succinate (TOPROL-XL) 50 mg 24 hr tablet Take 1 tablet (50 mg total) by mouth daily 6/9/24   Aminata Wilcox MD   montelukast (SINGULAIR) 10 mg tablet Take 1 tablet (10 mg total) by mouth daily at bedtime 2/3/22 10/29/24  Soo Chavez MD   nitroglycerin (NITROSTAT) 0.4 mg SL tablet Place 1 tablet (0.4 mg total) under the tongue every 5 (five) minutes as needed for chest pain for up to 50 doses 6/27/24   Earl Sarkar MD    potassium chloride (Klor-Con M20) 20 mEq tablet Take 2 tablets (40 mEq total) by mouth 3 (three) times a day 10/10/24   Travis Champion MD   pregabalin (LYRICA) 50 mg capsule Take 1 caps. at bedtime 8/19/24   Soo Chavez MD   sitaGLIPtin (Januvia) 100 mg tablet TAKE 1/2 TABLET BY MOUTH EVERY DAY 5/15/24   Soo Chavez MD   tiotropium (Spiriva Respimat) 1.25 MCG/ACT AERS inhaler Inhale 2 puffs daily 4/3/24 8/8/24  Soo Chavez MD     Allergies   Allergen Reactions    Azithromycin Other (See Comments)     Shaky, uneasy feeling     Bactrim [Sulfamethoxazole-Trimethoprim] Other (See Comments)     shakiness       Objective :  Temp:  [97.5 °F (36.4 °C)] 97.5 °F (36.4 °C)  HR:  [81] 81  BP: (127)/(78) 127/78  Resp:  [18] 18  SpO2:  [97 %] 97 %  O2 Device: None (Room air)    Physical Exam       Sitting up in bed  Obese  Short thick neck  Lungs diminished breath sounds bilateral  Vascular breath sounds  Heart sounds S1 and S2 noted  Heart sounds are distant  Abdomen soft nontender  Abdominal obesity noted  Awake follows commands  Lower extremity edema noted  No rash      Lines/Drains:            Lab Results: I have reviewed the following results:  Results from last 7 days   Lab Units 11/23/24  1508   WBC Thousand/uL 8.54   HEMOGLOBIN g/dL 11.8*   HEMATOCRIT % 36.8   PLATELETS Thousands/uL 238   SEGS PCT % 77*   LYMPHO PCT % 10*   MONO PCT % 7   EOS PCT % 4     Results from last 7 days   Lab Units 11/23/24  1508   SODIUM mmol/L 137   POTASSIUM mmol/L 3.9   CHLORIDE mmol/L 101   CO2 mmol/L 21   BUN mg/dL 21   CREATININE mg/dL 1.27   ANION GAP mmol/L 15*   CALCIUM mg/dL 7.8*   ALBUMIN g/dL 3.8   TOTAL BILIRUBIN mg/dL 0.46   ALK PHOS U/L 108*   ALT U/L 5*   AST U/L 10*   GLUCOSE RANDOM mg/dL 151*             Lab Results   Component Value Date    HGBA1C 8.3 (H) 06/19/2024    HGBA1C 9.3 (H) 12/28/2023    HGBA1C 7.1 (A) 08/09/2023           Imaging Results Review: I personally reviewed the following image  studies/reports in PACS and discussed pertinent findings with Radiology: chest xray, procedure reports, and Echocardiogram. My interpretation of the radiology images/reports is:  .  Other Study Results Review: EKG was reviewed.   ECG personally reviewed sinus rhythm no ST-T wave changes      ** Please Note: This note has been constructed using a voice recognition system. **

## 2024-11-24 LAB
ANION GAP SERPL CALCULATED.3IONS-SCNC: 11 MMOL/L (ref 4–13)
ANION GAP SERPL CALCULATED.3IONS-SCNC: 15 MMOL/L (ref 4–13)
ATRIAL RATE: 75 BPM
ATRIAL RATE: 76 BPM
BASOPHILS # BLD AUTO: 0.06 THOUSANDS/ΜL (ref 0–0.1)
BASOPHILS NFR BLD AUTO: 1 % (ref 0–1)
BUN SERPL-MCNC: 23 MG/DL (ref 5–25)
BUN SERPL-MCNC: 25 MG/DL (ref 5–25)
CALCIUM SERPL-MCNC: 8.6 MG/DL (ref 8.4–10.2)
CALCIUM SERPL-MCNC: 8.9 MG/DL (ref 8.4–10.2)
CHLORIDE SERPL-SCNC: 95 MMOL/L (ref 96–108)
CHLORIDE SERPL-SCNC: 97 MMOL/L (ref 96–108)
CO2 SERPL-SCNC: 26 MMOL/L (ref 21–32)
CO2 SERPL-SCNC: 28 MMOL/L (ref 21–32)
CREAT SERPL-MCNC: 1.54 MG/DL (ref 0.6–1.3)
CREAT SERPL-MCNC: 1.66 MG/DL (ref 0.6–1.3)
EOSINOPHIL # BLD AUTO: 0.45 THOUSAND/ΜL (ref 0–0.61)
EOSINOPHIL NFR BLD AUTO: 6 % (ref 0–6)
ERYTHROCYTE [DISTWIDTH] IN BLOOD BY AUTOMATED COUNT: 17.2 % (ref 11.6–15.1)
FERRITIN SERPL-MCNC: 46 NG/ML (ref 24–336)
GFR SERPL CREATININE-BSD FRML MDRD: 45 ML/MIN/1.73SQ M
GFR SERPL CREATININE-BSD FRML MDRD: 49 ML/MIN/1.73SQ M
GLUCOSE SERPL-MCNC: 124 MG/DL (ref 65–140)
GLUCOSE SERPL-MCNC: 138 MG/DL (ref 65–140)
GLUCOSE SERPL-MCNC: 154 MG/DL (ref 65–140)
GLUCOSE SERPL-MCNC: 181 MG/DL (ref 65–140)
GLUCOSE SERPL-MCNC: 184 MG/DL (ref 65–140)
GLUCOSE SERPL-MCNC: 194 MG/DL (ref 65–140)
HCT VFR BLD AUTO: 38.2 % (ref 36.5–49.3)
HGB BLD-MCNC: 12.2 G/DL (ref 12–17)
IMM GRANULOCYTES # BLD AUTO: 0.07 THOUSAND/UL (ref 0–0.2)
IMM GRANULOCYTES NFR BLD AUTO: 1 % (ref 0–2)
IRON SATN MFR SERPL: 30 % (ref 15–50)
IRON SERPL-MCNC: 127 UG/DL (ref 50–212)
LYMPHOCYTES # BLD AUTO: 1.2 THOUSANDS/ΜL (ref 0.6–4.47)
LYMPHOCYTES NFR BLD AUTO: 16 % (ref 14–44)
MAGNESIUM SERPL-MCNC: 1.7 MG/DL (ref 1.9–2.7)
MCH RBC QN AUTO: 28.4 PG (ref 26.8–34.3)
MCHC RBC AUTO-ENTMCNC: 31.9 G/DL (ref 31.4–37.4)
MCV RBC AUTO: 89 FL (ref 82–98)
MONOCYTES # BLD AUTO: 0.55 THOUSAND/ΜL (ref 0.17–1.22)
MONOCYTES NFR BLD AUTO: 7 % (ref 4–12)
NEUTROPHILS # BLD AUTO: 5.33 THOUSANDS/ΜL (ref 1.85–7.62)
NEUTS SEG NFR BLD AUTO: 69 % (ref 43–75)
NRBC BLD AUTO-RTO: 0 /100 WBCS
P AXIS: 117 DEGREES
P AXIS: 74 DEGREES
PLATELET # BLD AUTO: 225 THOUSANDS/UL (ref 149–390)
PMV BLD AUTO: 9.6 FL (ref 8.9–12.7)
POTASSIUM SERPL-SCNC: 4 MMOL/L (ref 3.5–5.3)
POTASSIUM SERPL-SCNC: 4.2 MMOL/L (ref 3.5–5.3)
PR INTERVAL: 152 MS
PR INTERVAL: 152 MS
QRS AXIS: 61 DEGREES
QRS AXIS: 62 DEGREES
QRSD INTERVAL: 70 MS
QRSD INTERVAL: 82 MS
QT INTERVAL: 364 MS
QT INTERVAL: 408 MS
QTC INTERVAL: 409 MS
QTC INTERVAL: 455 MS
RBC # BLD AUTO: 4.3 MILLION/UL (ref 3.88–5.62)
SODIUM SERPL-SCNC: 136 MMOL/L (ref 135–147)
SODIUM SERPL-SCNC: 136 MMOL/L (ref 135–147)
T WAVE AXIS: 73 DEGREES
T WAVE AXIS: 73 DEGREES
TIBC SERPL-MCNC: 430 UG/DL (ref 250–450)
UIBC SERPL-MCNC: 303 UG/DL (ref 155–355)
VENTRICULAR RATE: 75 BPM
VENTRICULAR RATE: 76 BPM
WBC # BLD AUTO: 7.66 THOUSAND/UL (ref 4.31–10.16)

## 2024-11-24 PROCEDURE — 83735 ASSAY OF MAGNESIUM: CPT | Performed by: INTERNAL MEDICINE

## 2024-11-24 PROCEDURE — 85025 COMPLETE CBC W/AUTO DIFF WBC: CPT | Performed by: INTERNAL MEDICINE

## 2024-11-24 PROCEDURE — 82728 ASSAY OF FERRITIN: CPT | Performed by: FAMILY MEDICINE

## 2024-11-24 PROCEDURE — 99222 1ST HOSP IP/OBS MODERATE 55: CPT | Performed by: STUDENT IN AN ORGANIZED HEALTH CARE EDUCATION/TRAINING PROGRAM

## 2024-11-24 PROCEDURE — 80048 BASIC METABOLIC PNL TOTAL CA: CPT | Performed by: STUDENT IN AN ORGANIZED HEALTH CARE EDUCATION/TRAINING PROGRAM

## 2024-11-24 PROCEDURE — 93010 ELECTROCARDIOGRAM REPORT: CPT | Performed by: INTERNAL MEDICINE

## 2024-11-24 PROCEDURE — 83550 IRON BINDING TEST: CPT | Performed by: FAMILY MEDICINE

## 2024-11-24 PROCEDURE — 83540 ASSAY OF IRON: CPT | Performed by: FAMILY MEDICINE

## 2024-11-24 PROCEDURE — 82948 REAGENT STRIP/BLOOD GLUCOSE: CPT

## 2024-11-24 PROCEDURE — 80048 BASIC METABOLIC PNL TOTAL CA: CPT | Performed by: INTERNAL MEDICINE

## 2024-11-24 PROCEDURE — 99232 SBSQ HOSP IP/OBS MODERATE 35: CPT | Performed by: FAMILY MEDICINE

## 2024-11-24 RX ORDER — BUMETANIDE 0.25 MG/ML
4 INJECTION, SOLUTION INTRAMUSCULAR; INTRAVENOUS ONCE
Status: COMPLETED | OUTPATIENT
Start: 2024-11-24 | End: 2024-11-24

## 2024-11-24 RX ORDER — LANOLIN ALCOHOL/MO/W.PET/CERES
400 CREAM (GRAM) TOPICAL 2 TIMES DAILY
Status: COMPLETED | OUTPATIENT
Start: 2024-11-24 | End: 2024-11-24

## 2024-11-24 RX ORDER — MAGNESIUM SULFATE HEPTAHYDRATE 40 MG/ML
2 INJECTION, SOLUTION INTRAVENOUS ONCE
Status: COMPLETED | OUTPATIENT
Start: 2024-11-24 | End: 2024-11-24

## 2024-11-24 RX ORDER — BUMETANIDE 0.25 MG/ML
1 INJECTION INTRAMUSCULAR; INTRAVENOUS CONTINUOUS
Status: DISCONTINUED | OUTPATIENT
Start: 2024-11-24 | End: 2024-11-26

## 2024-11-24 RX ADMIN — EMPAGLIFLOZIN 10 MG: 10 TABLET, FILM COATED ORAL at 09:06

## 2024-11-24 RX ADMIN — POTASSIUM CHLORIDE 40 MEQ: 1500 TABLET, EXTENDED RELEASE ORAL at 17:31

## 2024-11-24 RX ADMIN — APIXABAN 5 MG: 5 TABLET, FILM COATED ORAL at 09:05

## 2024-11-24 RX ADMIN — MAGNESIUM SULFATE HEPTAHYDRATE 2 G: 40 INJECTION, SOLUTION INTRAVENOUS at 14:06

## 2024-11-24 RX ADMIN — FAMOTIDINE 20 MG: 20 TABLET, FILM COATED ORAL at 09:05

## 2024-11-24 RX ADMIN — POTASSIUM CHLORIDE 40 MEQ: 1500 TABLET, EXTENDED RELEASE ORAL at 09:05

## 2024-11-24 RX ADMIN — MAGNESIUM OXIDE TAB 400 MG (241.3 MG ELEMENTAL MG) 400 MG: 400 (241.3 MG) TAB at 09:05

## 2024-11-24 RX ADMIN — UMECLIDINIUM 1 PUFF: 62.5 AEROSOL, POWDER ORAL at 11:28

## 2024-11-24 RX ADMIN — MAGNESIUM OXIDE TAB 400 MG (241.3 MG ELEMENTAL MG) 400 MG: 400 (241.3 MG) TAB at 17:31

## 2024-11-24 RX ADMIN — PREGABALIN 50 MG: 50 CAPSULE ORAL at 21:29

## 2024-11-24 RX ADMIN — CYANOCOBALAMIN TAB 500 MCG 1000 MCG: 500 TAB at 09:05

## 2024-11-24 RX ADMIN — LORATADINE 10 MG: 10 TABLET ORAL at 09:05

## 2024-11-24 RX ADMIN — INSULIN LISPRO 1 UNITS: 100 INJECTION, SOLUTION INTRAVENOUS; SUBCUTANEOUS at 17:38

## 2024-11-24 RX ADMIN — Medication 1 MG/HR: at 12:34

## 2024-11-24 RX ADMIN — BUMETANIDE 4 MG: 0.25 INJECTION INTRAMUSCULAR; INTRAVENOUS at 11:28

## 2024-11-24 RX ADMIN — METOPROLOL SUCCINATE 50 MG: 50 TABLET, EXTENDED RELEASE ORAL at 09:05

## 2024-11-24 RX ADMIN — POTASSIUM CHLORIDE 40 MEQ: 1500 TABLET, EXTENDED RELEASE ORAL at 21:29

## 2024-11-24 RX ADMIN — FAMOTIDINE 20 MG: 20 TABLET, FILM COATED ORAL at 17:31

## 2024-11-24 RX ADMIN — ATORVASTATIN CALCIUM 10 MG: 10 TABLET, FILM COATED ORAL at 09:05

## 2024-11-24 RX ADMIN — INSULIN LISPRO 1 UNITS: 100 INJECTION, SOLUTION INTRAVENOUS; SUBCUTANEOUS at 11:41

## 2024-11-24 RX ADMIN — MONTELUKAST 10 MG: 10 TABLET, FILM COATED ORAL at 21:29

## 2024-11-24 RX ADMIN — APIXABAN 5 MG: 5 TABLET, FILM COATED ORAL at 17:31

## 2024-11-24 RX ADMIN — INSULIN GLARGINE 15 UNITS: 100 INJECTION, SOLUTION SUBCUTANEOUS at 21:28

## 2024-11-24 RX ADMIN — BUDESONIDE AND FORMOTEROL FUMARATE DIHYDRATE 2 PUFF: 160; 4.5 AEROSOL RESPIRATORY (INHALATION) at 17:32

## 2024-11-24 NOTE — CASE MANAGEMENT
Case Management Assessment & Discharge Planning Note    Patient name Mesfin Cano  [facility] MRN [id number]  : 1965 Date 2024       Current Admission Date: 2024  Current Admission Diagnosis:Acute on chronic diastolic congestive heart failure (HCC)   Patient Active Problem List    Diagnosis Date Noted Date Diagnosed    Iron deficiency 10/07/2024     Onychomycosis 10/05/2024     Electrolyte abnormality 10/05/2024     Anemia 2024     Bilateral leg edema 2024     Abnormal stress test 2024     Uncontrolled atrial flutter (HCC) 2024     Moderate persistent asthma without complication 2024     Status post cholecystectomy 2024     Uncontrolled type 2 diabetes mellitus with hyperglycemia (HCC) 2023     Eosinophilia 10/18/2023     Anemia due to chronic kidney disease 10/16/2023     Nasal congestion 10/06/2023     Loose stools 2023     Acute on chronic diastolic congestive heart failure (HCC) 04/10/2023     Diabetic polyneuropathy associated with type 2 diabetes mellitus (HCC) 2022     Stage 3a chronic kidney disease (HCC) 2022     Presence of CardioMEMS HF system 2022     Paroxysmal atrial fibrillation (HCC) 2022     Hypokalemia 2021     HNP (herniated nucleus pulposus), lumbar 2021     Foraminal stenosis of lumbar region 2021     VICKY (obstructive sleep apnea)      Hyperlipidemia 2019     Primary osteoarthritis of both knees 2018     Irritable bowel syndrome with diarrhea 2018     Primary hypertension 2018     Vitamin B12 deficiency 2016     Vitamin D deficiency 2016     Morbid obesity (HCC) 2016     Primary osteoarthritis of right knee 10/15/2013       LOS (days): 1  Geometric Mean LOS (GMLOS) (days):   Days to GMLOS:     OBJECTIVE:    Risk of Unplanned Readmission Score: 34.59         Current admission status: Inpatient       Preferred Pharmacy:    [facility]/pharmacy #[id number] - [place], [place] - [address] [place] [address]  [address] [facility] [place]  [facility] [place] [address]  Phone: [phone] Fax: [phone]    Primary Care Provider: Soo Chavez MD    Primary Insurance: BLUE CROSS  Secondary Insurance:     ASSESSMENT:  Active Health Care Proxies       Karina Cano Health Care Representative - Spouse   Primary Phone: [phone] (Home)  Mobile Phone: [phone]                 Patient Information  Admitted from:: Home  Mental Status: Alert  During Assessment patient was accompanied by: Not accompanied during assessment  Assessment information provided by:: Patient  Primary Caregiver: Self  Support Systems: Spouse/significant other, Family members, Daughter  County of Residence: [place]  [facility] do you [facility] [place]  Home entry access options. Select all that apply.: Stairs  Number of steps to enter home.: 6  Type of Current Residence: [address] [place] home  Upon entering residence, is there a bedroom on the main floor (no further steps)?: No (patient sleeps on sofa)  Upon entering residence, is there a bathroom on the main floor (no further steps)?: No  Indicate which floors of current residence have a bathroom (select all the apply):: 2nd Floor  Living Arrangements: Lives w/ Spouse/significant other, Lives w/ Daughter  Is patient a ?: No    Activities of Daily Living Prior to Admission  Functional Status: Independent  Completes ADLs independently?: Yes  Ambulates independently?: Yes  Does patient use assisted devices?: No  Does patient currently own DME?: Yes  What DME does the patient currently own?: Straight Cane, Other (Comment) (uses urinal on 1st floor, will go to 2nd floor bathroom when needed)  Does patient have a history of Outpatient Therapy (PT/OT)?: No  Does the patient have a history of Short-Term Rehab?: No  Does patient have a history of HHC?: No  Does patient currently have HHC?: No    Patient Information Continued  Income Source: Unknown  Does  patient have prescription coverage?: Yes  Does patient receive dialysis treatments?: No  Does patient have a history of substance abuse?: No  Does patient have a history of Mental Health Diagnosis?: No    Means of Transportation  Means of Transport to Appts:: Drives Self    DISCHARGE DETAILS:    Discharge planning discussed with:: Patient  Freedom of Choice: Yes  Comments - Freedom of Choice: Discussed FOC  CM contacted family/caregiver?: No- see comments (declined)     This CM introduced self and role to patient, patient lives with spouse and daughter in 2 story home, [address], states he sleeps on the sofa on first floor, uses urinal on 1st floor, goes to 2nd floor to use the bathroom when needed.  IADLS, has cane at home, does not routinely use.  This CM offered HH, patient declined, states he has a cardiac mems unit, and has communication with cardiology when needed.  No history of OP therapy, HH, or STR noted

## 2024-11-24 NOTE — ASSESSMENT & PLAN NOTE
Wt Readings from Last 3 Encounters:   11/23/24 (!) 146 kg (321 lb 3.2 oz)   10/29/24 (!) 145 kg (319 lb)   10/10/24 (!) 145 kg (319 lb)       Acute on chronic diastolic congestive heart failure  Patient has a cardiac sensor and was told by his heart failure RN pressure is increased to 20  He is on Bumex 6 mg 3 times daily as well as metolazone twice weekly  Started on Bumex drip  Continue metoprolol  Empagliflozin 10 mg daily  Monitor and replete electrolytes  Cardiology following  Low-salt diet

## 2024-11-24 NOTE — PROGRESS NOTES
Progress Note - Hospitalist   Name: Mesfin Cano 59 y.o. male I MRN: [id number]  [facility] I Date of Admission: 11/23/2024   Date of Service: 11/24/2024 I Hospital Day: 1    Assessment & Plan  Acute on chronic diastolic congestive heart failure (HCC)  Wt Readings from Last 3 Encounters:   11/23/24 (!) 146 kg (321 lb 3.2 oz)   10/29/24 (!) 145 kg (319 lb)   10/10/24 (!) 145 kg (319 lb)       Acute on chronic diastolic congestive heart failure  Patient has a cardiac sensor and was told by his heart failure RN pressure is increased to 20  He is on Bumex 6 mg 3 times daily as well as metolazone twice weekly  Started on Bumex drip  Continue metoprolol  Empagliflozin 10 mg daily  Monitor and replete electrolytes  Cardiology following  Low-salt diet      Morbid obesity (HCC)  Therapeutic lifestyle modification encouraged  VICKY (obstructive sleep apnea)  Patient reports his CPAP machine is on recall.  Outpatient sleep study encouraged  CPAP compliance encouraged  Paroxysmal atrial fibrillation (HCC)  Continue metoprolol XL 50 mg p.o. daily  Eliquis anticoagulation  Uncontrolled type 2 diabetes mellitus with hyperglycemia (HCC)  Lab Results   Component Value Date    HGBA1C 7.3 (H) 11/23/2024       Recent Labs     11/23/24  1743 11/23/24  2047 11/24/24  0815 11/24/24  1137   POCGLU 142* 180* 138 184*       Blood Sugar Average: Last 72 hrs:  (P) 161  Diabetes mellitus type 2 uncontrolled  Hold sitagliptin while inpatient  Will have him on Lantus daily  Titrate insulin dose based on Accu-Cheks  Outpatient endocrinology follow-up for better diabetes control encouraged  Monitor Accu-Cheks  Avoidable glycemia      VTE Pharmacologic Prophylaxis: VTE Score: 4 Moderate Risk (Score 3-4) - Pharmacological DVT Prophylaxis Ordered: apixaban (Eliquis).    Mobility:   Basic Mobility Inpatient Raw Score: 24  JH-HLM Goal: 8: Walk 250 feet or more  JH-HLM Achieved: 1: Laying in bed  JH-HLM Goal NOT achieved. Continue with  multidisciplinary rounding and encourage appropriate mobility to improve upon [facility] goals.    Patient Centered Rounds: I performed bedside rounds with nursing staff today.   Discussions with Specialists or Other Care Team Provider: Cardiology    Education and Discussions with Family / Patient: Patient declined call to .     Current Length of Stay: 1 day(s)  Current Patient Status: Inpatient   Certification Statement: The patient will continue to require additional inpatient hospital stay due to Requires IV diuresis  Discharge Plan: Anticipate discharge in >72 hrs to home.    Code Status: Level 1 - Full Code    Subjective   This is a very pleasant 59 y.o. male who was seen today at bedside. Patient has no new complaints. Patient is not in any acute distress.       Objective :  Temp:  [97.5 °F (36.4 °C)-98.2 °F (36.8 °C)] 98 °F (36.7 °C)  HR:  [73-84] 73  BP: (122-151)/(65-78) 131/66  Resp:  [12-18] 12  SpO2:  [97 %-99 %] 98 %  O2 Device: None (Room air)    Body mass index is 42.38 kg/m².     Input and Output Summary (last 24 hours):     Intake/Output Summary (Last 24 hours) at 11/24/2024 1349  Last data filed at 11/24/2024 1234  Gross per 24 hour   Intake 500 ml   Output 1070 ml   Net -570 ml       Physical Exam  Vitals reviewed.   HENT:      Head: Normocephalic.      Nose: No congestion.      Mouth/Throat:      Pharynx: No oropharyngeal exudate or posterior oropharyngeal erythema.   Eyes:      Conjunctiva/sclera: Conjunctivae normal.   Cardiovascular:      Rate and Rhythm: Normal rate and regular rhythm.   Pulmonary:      Effort: Pulmonary effort is normal.      Breath sounds: Rales present.   Abdominal:      General: Abdomen is flat.      Palpations: Abdomen is soft.   Musculoskeletal:      Right lower leg: Edema present.      Left lower leg: Edema present.   Skin:     General: Skin is warm and dry.   Neurological:      Mental Status: He is alert and oriented to person, place, and time. Mental status  is at baseline.           Lines/Drains:        Telemetry:  Telemetry Orders (From admission, onward)               24 Hour Telemetry Monitoring  Continuous x 24 Hours (Telem)        Expiring   Question:  Reason for 24 Hour Telemetry  Answer:  Decompensated CHF- and any one of the following: continuous diuretic infusion or total diuretic dose >200 mg daily, associated electrolyte derangement (I.e. K < 3.0), ionotropic drip (continuous infusion), hx of ventricular arrhythmia, or new EF < 35%                     Telemetry Reviewed: Normal Sinus Rhythm  Indication for Continued Telemetry Use: Acute CHF on >200 mg lasix/day or equivalent dose or with new reduced EF.                Lab Results: I have reviewed the following results:   Results from last 7 days   Lab Units 11/24/24  0653   WBC Thousand/uL 7.66   HEMOGLOBIN g/dL 12.2   HEMATOCRIT % 38.2   PLATELETS Thousands/uL 225   SEGS PCT % 69   LYMPHO PCT % 16   MONO PCT % 7   EOS PCT % 6     Results from last 7 days   Lab Units 11/24/24  0653 11/23/24  1508   SODIUM mmol/L 136 137   POTASSIUM mmol/L 4.2 3.9   CHLORIDE mmol/L 97 101   CO2 mmol/L 28 21   BUN mg/dL 23 21   CREATININE mg/dL 1.54* 1.27   ANION GAP mmol/L 11 15*   CALCIUM mg/dL 8.6 7.8*   ALBUMIN g/dL  --  3.8   TOTAL BILIRUBIN mg/dL  --  0.46   ALK PHOS U/L  --  108*   ALT U/L  --  5*   AST U/L  --  10*   GLUCOSE RANDOM mg/dL 124 151*         Results from last 7 days   Lab Units 11/24/24  1137 11/24/24  0815 11/23/24  2047 11/23/24  1743   POC GLUCOSE mg/dl 184* 138 180* 142*     Results from last 7 days   Lab Units 11/23/24  1508   HEMOGLOBIN A1C % 7.3*           Recent Cultures (last 7 days):         Imaging Results Review: I reviewed radiology reports from this admission including: chest xray.  Other Study Results Review: No additional pertinent studies reviewed.    Last 24 Hours Medication List:     Current Facility-Administered Medications:     acetaminophen (TYLENOL) tablet 650 mg, Q6H PRN     albuterol (PROVENTIL HFA,VENTOLIN HFA) inhaler 1 puff, Q4H PRN    aluminum-magnesium hydroxide-simethicone (MAALOX) oral suspension 15 mL, Q4H PRN    apixaban (ELIQUIS) tablet 5 mg, BID    atorvastatin (LIPITOR) tablet 10 mg, Daily    budesonide-formoterol (SYMBICORT) 160-4.5 mcg/act inhaler 2 puff, BID    bumetanide (BUMEX) 12.5 mg infusion 50 mL, Continuous, Last Rate: 1 mg/hr (11/24/24 1234)    cyanocobalamin (VITAMIN B-12) tablet 1,000 mcg, Daily    Empagliflozin (JARDIANCE) tablet 10 mg, Daily    famotidine (PEPCID) tablet 20 mg, BID    fluticasone (FLONASE) 50 mcg/act nasal spray 1 spray, BID    insulin glargine (LANTUS) subcutaneous injection 15 Units 0.15 mL, HS    insulin lispro (HumALOG/ADMELOG) 100 units/mL subcutaneous injection 1-5 Units, TID AC **AND** Fingerstick Glucose (POCT), TID AC    loratadine (CLARITIN) tablet 10 mg, Daily    magnesium Oxide (MAG-OX) tablet 400 mg, BID    magnesium sulfate 2 g/50 mL IVPB (premix) 2 g, Once    [START ON 11/25/2024] metolazone (ZAROXOLYN) tablet 5 mg, Once per day on Monday Thursday    metoprolol succinate (TOPROL-XL) 24 hr tablet 50 mg, Daily    montelukast (SINGULAIR) tablet 10 mg, HS    nitroglycerin (NITROSTAT) SL tablet 0.4 mg, Q5 Min PRN    polyethylene glycol (MIRALAX) packet 17 g, Daily    potassium chloride (Klor-Con M20) CR tablet 40 mEq, TID    pregabalin (LYRICA) capsule 50 mg, HS    umeclidinium 62.5 mcg/actuation inhaler AEPB 1 puff, Daily    Administrative Statements   Today, Patient Was Seen By: Roly Parker MD  I have spent a total time of 45 minutes in caring for this patient on the day of the visit/encounter including Diagnostic results, Risks and benefits of tx options, Importance of tx compliance, Risk factor reductions, Counseling / Coordination of care, Reviewing / ordering tests, medicine, procedures  , and Communicating with other healthcare professionals .    **Please Note: This note may have been constructed using a voice  recognition system.**

## 2024-11-24 NOTE — CONSULTS
Consultation - Heart Failure  Mesfin Cano 59 y.o. male MRN: [id number]  Unit/Bed#: [facility] 410-01 Encounter: [id number]      Inpatient consult to Cardiology  Consult performed by: William Stahl MD  Consult ordered by: Sharon Samuel MD        PCP: Soo Chavez MD   Outpatient Cardiologist: Dr Wilcox    History of Present Illness   Physician Requesting Consult: Roly Parker MD  Reason for Consult / Principal Problem: Acute on chronic HFpEF    Assessment and Plan      Assessment:    Acute on chronic HFpEF, Stage C, NYHA III, EF 55%.  Etiology A-fib, hypertension and obesity.    GDMT:  ARNi/ACEI/ARB: None  Aldosterone antagonist: Spironolactone 25 mg daily  SGLT2 inhibitor: Empagliflozin 10 mg daily  Volume management:  Home diuretic: Bumex 6 mg 3 times daily with metolazone 5 mg twice weekly on Wednesdays and Thursdays and as needed  CardioMEMS implanted 3/9/2022, PAD goal 12 mmHg    Warm, volume up on exam  Dry weight 319  Weight on admission 321 pounds  , creatinine 1.27 below his baseline.  He was given furosemide 80 mg x 1 on 11/23 at 1500, furosemide 120 mg x 1 on 11/23 at 1744  And Bumex 4 mg x 1 on 11/24 at 1030 followed by Bumex gtt. at 1 mg/h started at 12:45 pm with about 1 L urine output so far.    Paroxysmal atrial fibrillation  [id number] 3  Rate control metoprolol succinate 50 mg daily, anticoagulation apixaban.    CKD 3  Baseline creatinine 1.5-2    Hypertension  Hyperlipidemia, last LDL 86 on 10/2023  VICKY  His CPAP was recalled, he is due to get a repeat sleep study and CPAP machine.  Type 2 diabetes mellitus  Severe persistent asthma  History of tobacco abuse  History of gastric bypass surgery    Plan:    Warm, volume up on exam.    Continue Bumex drip at current dose of 1 mg/h.  Metolazone as needed, last dose 11/23.  Continue empagliflozin 10 mg daily.  Twice daily BMP while on Bumex drip.  Aggressive potassium replacement.  Ins and outs, daily standing weights, fluid restriction  and sodium restriction.  Continue metoprolol succinate 50 mg daily, apixaban for anticoagulation.      Case discussed and reviewed with Dr. Darden. Please wait for final recommendations from Dr. Darden.    Thank you for involving us in the care of your patient.    Subjective     HPI:   Mesfin Cano is a 59 y.o. patient with past medical history of chronic HFpEF, A-fib, hypertension, type 2 diabetes mellitus, obesity, VICKY, asthma, CKD 3 and history of tobacco use presented to the emergency department with worsening shortness of breath and lower extremity swelling.    Patient was monitoring his readings on CardioMEMS over the past week, numbers were increasing up, remained in the range of 16-19.  He was experiencing some symptoms of difficulty breathing with exertion and increasing abdominal girth.  On Friday ( 1 day prior to admission) reported chest pain that increased with deep breathing and with moving.  He then experienced middle back pain and abdominal pain which are his usual symptoms with volume overload.  He could not get his shoes on and noticed significant lower extremity edema.  His weight at home was 330, his dry weight is 319.  Diuretics were escalated and he took extra doses of metolazone without significant response.  He called cardiology office and was told to present to the emergency department.  He is overall very compliant with his medications, he is takes 2000 mL fluid per day.  Takes no extra salt.      Review of Systems  CONSTITUTIONAL: Denies any fever, chills, rigors, and weight loss  HEENT: No earache or tinnitus, denies hearing loss or visual disturbances  CARDIOVASCULAR: As noted in HPI  RESPIRATORY: Denies any cough, hemoptysis, shortness of breath or dyspnea on exertion  GASTROINTESTINAL: Denies any diarrhea or constipation  GENITOURINARY: No problems with urination, denies any hematuria or dysuria  NEUROLOGIC: No dizziness or vertigo, denies headaches   MUSCULOSKELETAL: Denies any muscle or  joint pain   SKIN: Denies skin rashes or itching   ENDOCRINE: Denies excessive thirst, denies intolerance to heat or cold      Objective     Physical Exam  Vitals and nursing note reviewed.   Constitutional:       General: He is not in acute distress.     Appearance: Normal appearance. He is obese.   HENT:      Head: Normocephalic and atraumatic.   Cardiovascular:      Rate and Rhythm: Normal rate.      Heart sounds: Normal heart sounds. No murmur heard.  Pulmonary:      Breath sounds: Normal breath sounds. No wheezing.   Abdominal:      General: There is distension.      Tenderness: There is abdominal tenderness.   Musculoskeletal:      Right lower leg: Edema present.      Left lower leg: Edema present.   Neurological:      Mental Status: He is alert.         Vitals:  Temp:  [97.5 °F (36.4 °C)-98.2 °F (36.8 °C)] 98 °F (36.7 °C)  HR:  [73-84] 73  BP: (122-151)/(65-78) 131/66  Resp:  [12-18] 12  SpO2:  [97 %-99 %] 98 %  O2 Device: None (Room air)  Orthostatic Blood Pressures      Flowsheet Row Most Recent Value   Blood Pressure 131/66 filed at 11/24/2024 0700   Patient Position - Orthostatic VS Lying filed at 11/24/2024 0700            Intake and Outputs:  I/O         11/22 0701 11/23 0700 11/23 0701  11/24 0700 11/24 0701  11/25 0700    P.O.  500     Total Intake(mL/kg)  500 (3.4)     Urine (mL/kg/hr)  0 490 (0.7)    Total Output  0 490    Net  +500 -490                   Labs & Results:          Results from last 7 days   Lab Units 11/24/24  0653 11/23/24  1508   POTASSIUM mmol/L 4.2 3.9   CO2 mmol/L 28 21   CHLORIDE mmol/L 97 101   BUN mg/dL 23 21   CREATININE mg/dL 1.54* 1.27   EGFR ml/min/1.73sq m 49 62     Results from last 7 days   Lab Units 11/23/24  1508   AST U/L 10*   ALT U/L 5*   ALK PHOS U/L 108*   TOTAL PROTEIN g/dL 6.6   ALBUMIN g/dL 3.8   TOTAL BILIRUBIN mg/dL 0.46         Results from last 7 days   Lab Units 11/24/24  0653 11/23/24  1508   HEMOGLOBIN g/dL 12.2 11.8*   HEMATOCRIT % 38.2 36.8    PLATELETS Thousands/uL 225 238     Results from last 7 days   Lab Units 11/23/24  1508   HEMOGLOBIN A1C % 7.3*       Cardiac Imaging/Monitoring     Telemetry:   Personally reviewed by William Stahl MD:   Normal sinus rhythm     Previous Cath/PCI:  No results found for this or any previous visit.      Prior Echo:   Results for orders placed during the hospital encounter of 06/18/24    Echo complete w/ contrast if indicated    Interpretation Summary    Left Ventricle: Left ventricular cavity size is normal. Wall thickness is mildly increased. There is mild concentric hypertrophy. The left ventricular ejection fraction is 55%. Systolic function is normal.    The following segments are akinetic: basal inferolateral and mid inferolateral.    All other segments are normal.    Left Atrium: The atrium is moderately dilated (42-48 mL/m2).    Right Atrium: The atrium is mildly dilated.      Results for orders placed during the hospital encounter of 04/10/23    Echo complete w/ contrast if indicated    Interpretation Summary    Left Ventricle: Left ventricular cavity size is mildly dilated. Wall thickness is upper normal. The left ventricular ejection fraction is 50%. Systolic function is low normal. Wall motion is normal. Diastolic function is moderately abnormal, consistent with grade II (pseudonormal) relaxation.    Left Atrium: The atrium is mildly dilated.    Mitral Valve: There is trace regurgitation.      Prior Stress Test:  Results for orders placed during the hospital encounter of 06/18/24    NM Myocardial Perfusion Spect (Pharmacological Induced Stress and/or Rest)    Interpretation Summary    Stress ECG: No ST deviation is noted. The ECG was not diagnostic due to pharmacological (vasodilator) stress.    Stress ECG: The patient experienced no angina during the test.    Perfusion: There is a left ventricular perfusion defect that is medium in size with moderate reduction in uptake present in the entire inferior and  inferolateral location(s) that is partially reversible. The possibility of this defect being caused by diaphragmatic attenuation artifact cannot be completely excluded.    Stress Function: Left ventricular function post-stress is normal. Stress ejection fraction is 51%.    Stress Combined Conclusion: The ECG and SPECT imaging portions of the stress study are discordant but are nonetheless concerning for inducible myocardial ischemia given the known limited sensitivity of stress electrocardiography. Left ventricular perfusion is probably abnormal. The possibility of ischemia cannot be excluded.    Prior nuclear study available for comparison. Prior study date: 9/2/2022. Changes noted when compared to prior study. Changes include: Defect appears to be similar to prior study, but is worse and more severe..    Medium to large size partially reversible inferior/inferolateral perfusion defect concerning for inferior ischemia with inferolateral infarct v diaphragmatic attenuation artifact.  Prone imaging could not be performed to further clarify this defect.  Clinical correlation is suggested.      Results for orders placed during the hospital encounter of 09/01/22    NM myocardial perfusion spect (rx stress and/or rest)    Interpretation Summary    Stress ECG: No ST deviation is noted. The ECG was negative for ischemia. The stress ECG is negative for ischemia after pharmacologic vasodilation, without reproduction of symptoms.    Perfusion: There is a left ventricular perfusion defect that is medium in size with moderate reduction in uptake present in the basal to mid inferior location(s) that is fixed suggestive of an inferior infarct. Significant ischemia is not present.    Stress Function: Left ventricular function post-stress is abnormal. There was a single regional abnormality during stress. Post-stress ejection fraction is 53 %.    Abnormal study due to the presence of an inferior and inferolateral infarct without  significant ischemia.         Recent Device Check:  No results found for any visits on 11/23/24.     EKG:  normal sinus rhythm  Encounter Date: 11/23/24   ECG 12 lead   Result Value    Ventricular Rate 76    Atrial Rate 76    AR Interval 152    QRSD Interval 70    QT Interval 364    QTC Interval 409    P Axis 117    QRS Axis 61    T Wave Axis 73       William Stahl MD  Cardiovascular Disease Fellow, FY-1  [facility]

## 2024-11-24 NOTE — ASSESSMENT & PLAN NOTE
Lab Results   Component Value Date    HGBA1C 7.3 (H) 11/23/2024       Recent Labs     11/23/24  1743 11/23/24  2047 11/24/24  0815 11/24/24  1137   POCGLU 142* 180* 138 184*       Blood Sugar Average: Last 72 hrs:  (P) 161  Diabetes mellitus type 2 uncontrolled  Hold sitagliptin while inpatient  Will have him on Lantus daily  Titrate insulin dose based on Accu-Cheks  Outpatient endocrinology follow-up for better diabetes control encouraged  Monitor Accu-Cheks  Avoidable glycemia

## 2024-11-24 NOTE — PLAN OF CARE
Problem: PAIN - ADULT  Goal: Verbalizes/displays adequate comfort level or baseline comfort level  Description: Interventions:  - Encourage patient to monitor pain and request assistance  - Assess pain using appropriate pain scale  - Administer analgesics based on type and severity of pain and evaluate response  - Implement non-pharmacological measures as appropriate and evaluate response  - Consider cultural and social influences on pain and pain management  - Notify physician/advanced practitioner if interventions unsuccessful or patient reports new pain  Outcome: Progressing     Problem: INFECTION - ADULT  Goal: Absence or prevention of progression during hospitalization  Description: INTERVENTIONS:  - Assess and monitor for signs and symptoms of infection  - Monitor lab/diagnostic results  - Monitor all insertion sites, i.e. indwelling lines, tubes, and drains  - Monitor endotracheal if appropriate and nasal secretions for changes in amount and color  - [place] appropriate cooling/warming therapies per order  - Administer medications as ordered  - Instruct and encourage patient and family to use good hand hygiene technique  - Identify and instruct in appropriate isolation precautions for identified infection/condition  Outcome: Progressing  Goal: Absence of fever/infection during neutropenic period  Description: INTERVENTIONS:  - Monitor WBC    Outcome: Progressing     Problem: SAFETY ADULT  Goal: Patient will remain free of falls  Description: INTERVENTIONS:  - Educate patient/family on patient safety including physical limitations  - Instruct patient to call for assistance with activity   - Consult OT/PT to assist with strengthening/mobility   - Keep Call bell within reach  - Keep bed low and locked with side rails adjusted as appropriate  - Keep care items and personal belongings within reach  - Initiate and maintain comfort rounds  - Make Fall Risk Sign visible to staff  - Offer Toileting every  Hours,  in advance of need  - Initiate/Maintain alarm  - Obtain necessary fall risk management equipment:   - Apply yellow socks and bracelet for high fall risk patients  - Consider moving patient to room near nurses station  Outcome: Progressing  Goal: Maintain or return to baseline ADL function  Description: INTERVENTIONS:  -  Assess patient's ability to carry out ADLs; assess patient's baseline for ADL function and identify physical deficits which impact ability to perform ADLs (bathing, care of mouth/teeth, toileting, grooming, dressing, etc.)  - Assess/evaluate cause of self-care deficits   - Assess range of motion  - Assess patient's mobility; develop plan if impaired  - Assess patient's need for assistive devices and provide as appropriate  - Encourage maximum independence but intervene and supervise when necessary  - Involve family in performance of ADLs  - Assess for home care needs following discharge   - Consider OT consult to assist with ADL evaluation and planning for discharge  - Provide patient education as appropriate  Outcome: Progressing  Goal: Maintains/Returns to pre admission functional level  Description: INTERVENTIONS:  - Perform AM-PAC 6 Click Basic Mobility/ Daily Activity assessment daily.  - Set and communicate daily mobility goal to care team and patient/family/caregiver.   - Collaborate with rehabilitation services on mobility goals if consulted  - Perform Range of Motion  times a day.  - Reposition patient every  hours.  - Dangle patient  times a day  - Stand patient  times a day  - Ambulate patient  times a day  - Out of bed to chair  times a day   - Out of bed for meals times a day  - Out of bed for toileting  - Record patient progress and toleration of activity level   Outcome: Progressing     Problem: DISCHARGE PLANNING  Goal: Discharge to home or other facility with appropriate resources  Description: INTERVENTIONS:  - Identify barriers to discharge w/patient and caregiver  - Arrange for  needed discharge resources and transportation as appropriate  - Identify discharge learning needs (meds, wound care, etc.)  - Arrange for interpretive services to assist at discharge as needed  - Refer to Case Management Department for coordinating discharge planning if the patient needs post-hospital services based on physician/advanced practitioner order or complex needs related to functional status, cognitive ability, or social support system  Outcome: Progressing     Problem: Knowledge Deficit  Goal: Patient/family/caregiver demonstrates understanding of disease process, treatment plan, medications, and discharge instructions  Description: Complete learning assessment and assess knowledge base.  Interventions:  - Provide teaching at level of understanding  - Provide teaching via preferred learning methods  Outcome: Progressing

## 2024-11-25 ENCOUNTER — PATIENT OUTREACH (OUTPATIENT)
Dept: CARDIOLOGY CLINIC | Facility: CLINIC | Age: 59
End: 2024-11-25

## 2024-11-25 ENCOUNTER — TELEPHONE (OUTPATIENT)
Dept: CARDIOLOGY CLINIC | Facility: CLINIC | Age: 59
End: 2024-11-25

## 2024-11-25 LAB
ANION GAP SERPL CALCULATED.3IONS-SCNC: 11 MMOL/L (ref 4–13)
ANION GAP SERPL CALCULATED.3IONS-SCNC: 12 MMOL/L (ref 4–13)
BASOPHILS # BLD AUTO: 0.05 THOUSANDS/ΜL (ref 0–0.1)
BASOPHILS NFR BLD AUTO: 1 % (ref 0–1)
BUN SERPL-MCNC: 26 MG/DL (ref 5–25)
BUN SERPL-MCNC: 30 MG/DL (ref 5–25)
CALCIUM SERPL-MCNC: 8.2 MG/DL (ref 8.4–10.2)
CALCIUM SERPL-MCNC: 8.4 MG/DL (ref 8.4–10.2)
CHLORIDE SERPL-SCNC: 94 MMOL/L (ref 96–108)
CHLORIDE SERPL-SCNC: 98 MMOL/L (ref 96–108)
CO2 SERPL-SCNC: 27 MMOL/L (ref 21–32)
CO2 SERPL-SCNC: 29 MMOL/L (ref 21–32)
CREAT SERPL-MCNC: 1.76 MG/DL (ref 0.6–1.3)
CREAT SERPL-MCNC: 1.8 MG/DL (ref 0.6–1.3)
EOSINOPHIL # BLD AUTO: 0.45 THOUSAND/ΜL (ref 0–0.61)
EOSINOPHIL NFR BLD AUTO: 6 % (ref 0–6)
ERYTHROCYTE [DISTWIDTH] IN BLOOD BY AUTOMATED COUNT: 16.8 % (ref 11.6–15.1)
GFR SERPL CREATININE-BSD FRML MDRD: 40 ML/MIN/1.73SQ M
GFR SERPL CREATININE-BSD FRML MDRD: 41 ML/MIN/1.73SQ M
GLUCOSE SERPL-MCNC: 131 MG/DL (ref 65–140)
GLUCOSE SERPL-MCNC: 143 MG/DL (ref 65–140)
GLUCOSE SERPL-MCNC: 170 MG/DL (ref 65–140)
GLUCOSE SERPL-MCNC: 194 MG/DL (ref 65–140)
GLUCOSE SERPL-MCNC: 194 MG/DL (ref 65–140)
GLUCOSE SERPL-MCNC: 202 MG/DL (ref 65–140)
HCT VFR BLD AUTO: 36.6 % (ref 36.5–49.3)
HGB BLD-MCNC: 11.5 G/DL (ref 12–17)
IMM GRANULOCYTES # BLD AUTO: 0.05 THOUSAND/UL (ref 0–0.2)
IMM GRANULOCYTES NFR BLD AUTO: 1 % (ref 0–2)
LYMPHOCYTES # BLD AUTO: 1.06 THOUSANDS/ΜL (ref 0.6–4.47)
LYMPHOCYTES NFR BLD AUTO: 15 % (ref 14–44)
MAGNESIUM SERPL-MCNC: 2.2 MG/DL (ref 1.9–2.7)
MCH RBC QN AUTO: 28.2 PG (ref 26.8–34.3)
MCHC RBC AUTO-ENTMCNC: 31.4 G/DL (ref 31.4–37.4)
MCV RBC AUTO: 90 FL (ref 82–98)
MONOCYTES # BLD AUTO: 0.51 THOUSAND/ΜL (ref 0.17–1.22)
MONOCYTES NFR BLD AUTO: 7 % (ref 4–12)
NEUTROPHILS # BLD AUTO: 5.04 THOUSANDS/ΜL (ref 1.85–7.62)
NEUTS SEG NFR BLD AUTO: 70 % (ref 43–75)
NRBC BLD AUTO-RTO: 0 /100 WBCS
PLATELET # BLD AUTO: 220 THOUSANDS/UL (ref 149–390)
PMV BLD AUTO: 10.2 FL (ref 8.9–12.7)
POTASSIUM SERPL-SCNC: 3.9 MMOL/L (ref 3.5–5.3)
POTASSIUM SERPL-SCNC: 4.1 MMOL/L (ref 3.5–5.3)
RBC # BLD AUTO: 4.08 MILLION/UL (ref 3.88–5.62)
SODIUM SERPL-SCNC: 132 MMOL/L (ref 135–147)
SODIUM SERPL-SCNC: 139 MMOL/L (ref 135–147)
WBC # BLD AUTO: 7.16 THOUSAND/UL (ref 4.31–10.16)

## 2024-11-25 PROCEDURE — 82948 REAGENT STRIP/BLOOD GLUCOSE: CPT

## 2024-11-25 PROCEDURE — 99232 SBSQ HOSP IP/OBS MODERATE 35: CPT | Performed by: STUDENT IN AN ORGANIZED HEALTH CARE EDUCATION/TRAINING PROGRAM

## 2024-11-25 PROCEDURE — 80048 BASIC METABOLIC PNL TOTAL CA: CPT | Performed by: STUDENT IN AN ORGANIZED HEALTH CARE EDUCATION/TRAINING PROGRAM

## 2024-11-25 PROCEDURE — 83735 ASSAY OF MAGNESIUM: CPT | Performed by: FAMILY MEDICINE

## 2024-11-25 PROCEDURE — 85025 COMPLETE CBC W/AUTO DIFF WBC: CPT | Performed by: FAMILY MEDICINE

## 2024-11-25 PROCEDURE — 99232 SBSQ HOSP IP/OBS MODERATE 35: CPT | Performed by: FAMILY MEDICINE

## 2024-11-25 RX ADMIN — POTASSIUM CHLORIDE 40 MEQ: 1500 TABLET, EXTENDED RELEASE ORAL at 09:09

## 2024-11-25 RX ADMIN — POTASSIUM CHLORIDE 40 MEQ: 1500 TABLET, EXTENDED RELEASE ORAL at 21:57

## 2024-11-25 RX ADMIN — POTASSIUM CHLORIDE 40 MEQ: 1500 TABLET, EXTENDED RELEASE ORAL at 15:49

## 2024-11-25 RX ADMIN — PREGABALIN 50 MG: 50 CAPSULE ORAL at 21:57

## 2024-11-25 RX ADMIN — BUDESONIDE AND FORMOTEROL FUMARATE DIHYDRATE 2 PUFF: 160; 4.5 AEROSOL RESPIRATORY (INHALATION) at 17:43

## 2024-11-25 RX ADMIN — EMPAGLIFLOZIN 10 MG: 10 TABLET, FILM COATED ORAL at 09:11

## 2024-11-25 RX ADMIN — Medication 1 MG/HR: at 15:09

## 2024-11-25 RX ADMIN — MONTELUKAST 10 MG: 10 TABLET, FILM COATED ORAL at 21:57

## 2024-11-25 RX ADMIN — ATORVASTATIN CALCIUM 10 MG: 10 TABLET, FILM COATED ORAL at 09:10

## 2024-11-25 RX ADMIN — CYANOCOBALAMIN TAB 500 MCG 1000 MCG: 500 TAB at 09:10

## 2024-11-25 RX ADMIN — Medication 1 MG/HR: at 08:59

## 2024-11-25 RX ADMIN — INSULIN LISPRO 1 UNITS: 100 INJECTION, SOLUTION INTRAVENOUS; SUBCUTANEOUS at 12:34

## 2024-11-25 RX ADMIN — INSULIN LISPRO 1 UNITS: 100 INJECTION, SOLUTION INTRAVENOUS; SUBCUTANEOUS at 17:43

## 2024-11-25 RX ADMIN — BUDESONIDE AND FORMOTEROL FUMARATE DIHYDRATE 2 PUFF: 160; 4.5 AEROSOL RESPIRATORY (INHALATION) at 09:12

## 2024-11-25 RX ADMIN — APIXABAN 5 MG: 5 TABLET, FILM COATED ORAL at 09:16

## 2024-11-25 RX ADMIN — METOPROLOL SUCCINATE 50 MG: 50 TABLET, EXTENDED RELEASE ORAL at 09:10

## 2024-11-25 RX ADMIN — INSULIN GLARGINE 15 UNITS: 100 INJECTION, SOLUTION SUBCUTANEOUS at 21:57

## 2024-11-25 RX ADMIN — APIXABAN 5 MG: 5 TABLET, FILM COATED ORAL at 17:44

## 2024-11-25 RX ADMIN — UMECLIDINIUM 1 PUFF: 62.5 AEROSOL, POWDER ORAL at 09:12

## 2024-11-25 NOTE — UTILIZATION REVIEW
NOTIFICATION OF INPATIENT ADMISSION   AUTHORIZATION REQUEST   SERVICING FACILITY:   [facility]  Address: [address] [place] [address]  Tax ID: [id number]  NPI: [id number] ATTENDING PROVIDER:  Attending Name and NPI#: Roly Parker Md [[id number]]  Address: [address] [place] [address]  Phone: [phone]   ADMISSION INFORMATION:  Place of Service: Inpatient [facility]  Place of Service Code: 21  Inpatient Admission Date/Time: 11/23/24  4:30 PM  Discharge Date/Time: No discharge date for patient encounter.  Admitting Diagnosis Code/Description:  Shortness of breath [R06.02]  Acute on chronic diastolic congestive heart failure (HCC) [I50.33]     UTILIZATION REVIEW CONTACT:  Bayron Candelaria Utilization   Network Utilization Review Department  Phone: [phone]  Fax: [phone]  Email: [email or web address]  Contact for approvals/pending authorizations, clinical reviews, and discharge.     PHYSICIAN ADVISORY SERVICES:  Medical Necessity Denial & Lsqo-ib-Mmdj Review  Phone: [phone]  Fax: [phone]  Email: [email or web address]     DISCHARGE SUPPORT TEAM:  For Patients Discharge Needs & Updates  Phone: [phone] opt. 2 Fax: [phone]  Email: [email or web address]

## 2024-11-25 NOTE — PROGRESS NOTES
Patient listed on Advanced Heart Failure Census at [facility].     Outpatient Advanced Heart Failure LCSW completed electronic chart review and rounded with the HF Team.  Dr. Darden examined patient and discussed Today's Plan.    Referral for outpatient social work not entered at this time.   Please enter referral if outpatient resources are needed in the future.

## 2024-11-25 NOTE — UTILIZATION REVIEW
Initial Clinical Review    Admission: Date/Time/Statement:   Admission Orders (From admission, onward)       Ordered        11/23/24 1630  INPATIENT ADMISSION  Once                          Orders Placed This Encounter   Procedures    INPATIENT ADMISSION     Standing Status:   Standing     Number of Occurrences:   1     Level of Care:   Med Surg [16]     Estimated length of stay:   More than 2 Midnights     Certification:   I certify that inpatient services are medically necessary for this patient for a duration of greater than two midnights. See H&P and MD Progress Notes for additional information about the patient's course of treatment.     ED Arrival Information       Expected   11/23/2024 14:45    Arrival   11/23/2024 14:39    Acuity   Urgent              Means of arrival   Ambulance    Escorted by   [facility] ([place])    Service   Hospitalist    Admission type   Emergency              Arrival complaint   Shortness of Breath,swelling of lower legs,feet and abdomen, 11 lbs. weight gain since yesterday.             Chief Complaint   Patient presents with    Shortness of Breath     Patient with hx CHF reports exacerbation that began last night with abdominal fullness, swelling in legs, and SOB on exertion. Denies CP.       Initial Presentation: 59 y.o. male with PMH obesity, T2 DM, A-fib, CHF, VICKY to ED by ems with worsening sob, b/l LE swelling and weight gain. Pt reports compliance with meds and dietary restrictions of fluid and salt intake. On presentation VSS. + rales, abd tenderness, b/l LE nonpitting edema. Pt has a cardiac sensor and was told by his heart failure RN pressure is increased to 20. He is on Bumex 6 mg TID as well as metolazone 2x/wk. Hgb 11.8. AGAP 15, CA 7.8, Alk phos 108, ALT 5, AST 10, Glucose 151. . EKG: no ST wave changes. CXR neg.  Admit Inpatient to MS Unit with Acute on Chronic Diastolic HF --  Telemetry. IV lasix 120 mg x1. Bumex gtt. Continue pta po meds. BMP in AM. Cardiac  diet. Start Lantus daily, Accu-Cheks w/ ssi.     Anticipated Length of Stay/Certification Statement:  Patient will be admitted on an inpatient basis with an anticipated length of stay of greater than 2 midnights secondary to Ac on Ch CHF for management as outlined.     Date: 11/24   Day 2: continues with rales. Remains on RA. Tele - NSR. Continue Bumex gtt. Monitor I/Os, daily wts. Po meds. Cardiac meds, supportive care.    HF consult -- A: Acute on chronic HFpEF, Stage C, NYHA III, EF 55%. Pplan: Warm, volume up on exam. Continue Bumex drip at current dose of 1 mg/hr. Metolazone prn last dose 11/23. Continue empagliflozin 10 mg daily. Twice daily BMP while on Bumex drip. Aggressive potassium replacement.  I/Os, daily standing wts, fluid restriction and sodium restriction. Continue metoprolol succinate 50 mg daily, apixaban for anticoagulation.       ED Treatment-Medication Administration from 11/23/2024 1416 to 11/23/2024 2127         Date/Time Order Dose Route Action     11/23/2024 1508 furosemide (LASIX) injection 80 mg 80 mg Intravenous Given     11/23/2024 1744 apixaban (ELIQUIS) tablet 5 mg 5 mg Oral Given     11/23/2024 1744 famotidine (PEPCID) tablet 20 mg 20 mg Oral Given     11/23/2024 1744 potassium chloride (Klor-Con M20) CR tablet 40 mEq 40 mEq Oral Given     11/23/2024 1744 furosemide (LASIX) injection 120 mg 120 mg Intravenous Given     Scheduled Medications:  apixaban, 5 mg, Oral, BID  atorvastatin, 10 mg, Oral, Daily  budesonide-formoterol, 2 puff, Inhalation, BID  cyanocobalamin, 1,000 mcg, Oral, Daily  Empagliflozin, 10 mg, Oral, Daily  famotidine, 20 mg, Oral, BID  fluticasone, 1 spray, Nasal, BID  insulin glargine, 15 Units, Subcutaneous, HS  insulin lispro, 1-5 Units, Subcutaneous, TID AC  loratadine, 10 mg, Oral, Daily  metolazone, 5 mg, Oral, Once per day on Monday Thursday  metoprolol succinate, 50 mg, Oral, Daily  montelukast, 10 mg, Oral, HS  polyethylene glycol, 17 g, Oral,  Daily  potassium chloride, 40 mEq, Oral, TID  pregabalin, 50 mg, Oral, HS  umeclidinium, 1 puff, Inhalation, Daily    Continuous IV Infusions:  bumetanide (BUMEX) 12.5 mg infusion 50 mL, 1 mg/hr, Intravenous, Continuous    PRN Meds:  acetaminophen, 650 mg, Oral, Q6H PRN  albuterol, 1 puff, Inhalation, Q4H PRN  aluminum-magnesium hydroxide-simethicone, 15 mL, Oral, Q4H PRN  nitroglycerin, 0.4 mg, Sublingual, Q5 Min PRN      ED Triage Vitals [11/23/24 1444]   Temperature Pulse Respirations Blood Pressure SpO2 Pain Score   97.5 °F (36.4 °C) 81 18 127/78 97 % No Pain     Weight (last 2 days)       Date/Time Weight    11/25/24 0500 143 (315.8)    11/23/24 2100 146 (321.2)            Vital Signs (last 3 days)       Date/Time Temp Pulse Resp BP MAP (mmHg) SpO2 O2 Device Patient Position - Orthostatic VS Violet Coma Scale Score Pain    11/24/24 2254 97.9 °F (36.6 °C) 73 20 135/83 103 99 % None (Room air) Lying -- --    11/24/24 2055 -- -- -- -- -- -- -- -- -- No Pain    11/24/24 1956 98.4 °F (36.9 °C) 85 18 136/72 98 97 % None (Room air) Lying -- --    11/24/24 1542 98.3 °F (36.8 °C) 82 20 138/82 100 95 % -- Lying -- --    11/24/24 0800 -- -- -- -- -- -- None (Room air) -- 15 No Pain    11/24/24 0700 98 °F (36.7 °C) 73 -- 131/66 -- 98 % None (Room air) Lying -- --    11/24/24 0314 98.2 °F (36.8 °C) 74 -- 129/65 92 97 % None (Room air) Lying -- --    11/23/24 2300 98.1 °F (36.7 °C) 78 -- 151/73 105 99 % None (Room air) Sitting -- --    11/23/24 2135 98.2 °F (36.8 °C) 84 -- 139/74 100 99 % None (Room air) Lying 15 No Pain    11/23/24 2100 -- -- -- -- -- -- -- -- -- No Pain    11/23/24 1740 -- 75 12 122/68 -- 99 % None (Room air) Sitting -- No Pain    11/23/24 1533 -- -- -- -- -- -- None (Room air) -- 15 --    11/23/24 1444 97.5 °F (36.4 °C) 81 18 127/78 -- 97 % None (Room air) Sitting -- No Pain           Pertinent Labs/Diagnostic Test Results:   Radiology:  XR chest portable   Final Interpretation by Carrie Marie MD  (11/23 1839)      No acute cardiopulmonary disease.        Cardiology:  ECG 12 lead   Final Result by Billy Graves MD (11/24 1707)   Normal sinus rhythm   Low voltage QRS   Nonspecific ST and T wave abnormality   Abnormal ECG   When compared with ECG of 23-Nov-2024 14:59, (unconfirmed)   No significant change was found   Confirmed by Billy Graves ([id number]) on 11/24/2024 5:07:17 PM      ECG 12 lead   Final Result by Billy Graves MD (11/24 1707)   Normal sinus rhythm   Nonspecific T wave abnormality   Low voltage QRS   Borderline ECG   When compared with ECG of 04-Oct-2024 13:34,   Aberrant conduction is no longer Present   Confirmed by Billy Graves ([id number]) on 11/24/2024 5:07:13 PM          Results from last 7 days   Lab Units 11/24/24  0653 11/23/24  1508   WBC Thousand/uL 7.66 8.54   HEMOGLOBIN g/dL 12.2 11.8*   HEMATOCRIT % 38.2 36.8   PLATELETS Thousands/uL 225 238   TOTAL NEUT ABS Thousands/µL 5.33 6.55       Results from last 7 days   Lab Units 11/24/24  1802 11/24/24  0653 11/23/24  1508   SODIUM mmol/L 136 136 137   POTASSIUM mmol/L 4.0 4.2 3.9   CHLORIDE mmol/L 95* 97 101   CO2 mmol/L 26 28 21   ANION GAP mmol/L 15* 11 15*   BUN mg/dL 25 23 21   CREATININE mg/dL 1.66* 1.54* 1.27   EGFR ml/min/1.73sq m 45 49 62   CALCIUM mg/dL 8.9 8.6 7.8*   MAGNESIUM mg/dL  --  1.7*  --      Results from last 7 days   Lab Units 11/23/24  1508   AST U/L 10*   ALT U/L 5*   ALK PHOS U/L 108*   TOTAL PROTEIN g/dL 6.6   ALBUMIN g/dL 3.8   TOTAL BILIRUBIN mg/dL 0.46     Results from last 7 days   Lab Units 11/24/24  2130 11/24/24  1728 11/24/24  1137 11/24/24  0815 11/23/24  2047 11/23/24  1743   POC GLUCOSE mg/dl 194* 154* 184* 138 180* 142*     Results from last 7 days   Lab Units 11/24/24  1802 11/24/24  0653 11/23/24  1508   GLUCOSE RANDOM mg/dL 181* 124 151*     Results from last 7 days   Lab Units 11/23/24  1508   HEMOGLOBIN A1C % 7.3*   EAG mg/dl 163       Results from last 7 days   Lab Units 11/23/24  1736 11/23/24  1508   HS  TNI 0HR ng/L  --  13   HS TNI 2HR ng/L 12  --    HSTNI D2 ng/L -1  --      Results from last 7 days   Lab Units 11/23/24  1508   BNP pg/mL 254*     Results from last 7 days   Lab Units 11/24/24  0653   FERRITIN ng/mL 46   IRON SATURATION % 30   IRON ug/dL 127   TIBC ug/dL 430           Past Medical History:   Diagnosis Date    Acute gastritis without hemorrhage     last assessed 3/24/17    Asthma     Atrial fibrillation (HCC)     Bilateral leg edema 02/19/2020    CHF (congestive heart failure) (HCC)     Coronary artery disease     Daytime sleepiness 07/17/2019    Diabetes mellitus (HCC)     Dyspnea on exertion 10/11/2021    Edema of both feet 01/23/2020    Gastric bypass status for obesity     Hyperlipidemia     Hypertension     Hypokalemia 11/14/2021    Impaired fasting glucose     last assessed 5/10/17    Knee sprain, bilateral 06/14/2018    Leg cramps 06/16/2022    Obesity     Viral gastroenteritis     last assessed 11/4/16     Present on Admission:   Acute on chronic HFpEF, NYHA III, stage C   Uncontrolled type 2 diabetes mellitus with hyperglycemia (HCC)   Paroxysmal atrial fibrillation (HCC)   VICKY (obstructive sleep apnea)   Morbid obesity (HCC)   Primary hypertension   Hyperlipidemia   Presence of CardioMEMS HF system      Admitting Diagnosis: Shortness of breath [R06.02]  Acute on chronic diastolic congestive heart failure (HCC) [I50.33]  Age/Sex: 59 y.o. male    Network Utilization Review Department  ATTENTION: Please call with any questions or concerns to [phone] and carefully listen to the prompts so that you are directed to the right person. All voicemails are confidential.   For Discharge needs, contact Care Management DC Support Team at [phone] opt. 2  Send all requests for admission clinical reviews, approved or denied determinations and any other requests to dedicated fax number below belonging to the campus where the patient is receiving treatment. List of dedicated fax numbers for the  Facilities:  FACILITY NAME UR FAX NUMBER   ADMISSION DENIALS (Administrative/Medical Necessity) [phone]   DISCHARGE SUPPORT TEAM (NETWORK) [phone]   PARENT CHILD HEALTH (Maternity/NICU/Pediatrics) [phone]   [facility] [phone]   [facility] [phone]   [facility] [phone]   [facility] [phone]   [facility] [phone]   [facility] [phone]   [facility] [phone]   [facility] [phone]   [facility] [phone]   [facility] [phone]   [facility] [phone]   [facility] [phone]

## 2024-11-25 NOTE — PROGRESS NOTES
Progress Note - Hospitalist   Name: Mesfin Cano 59 y.o. male I MRN: [id number]  Unit/Bed#: [facility] 410-01 I Date of Admission: 11/23/2024   Date of Service: 11/25/2024 I Hospital Day: 2    Assessment & Plan  Acute on chronic HFpEF, NYHA III, stage C  Wt Readings from Last 3 Encounters:   11/25/24 (!) 143 kg (315 lb 12.8 oz)   10/29/24 (!) 145 kg (319 lb)   10/10/24 (!) 145 kg (319 lb)       Acute on chronic diastolic congestive heart failure  Patient has a cardiac sensor and was told by his heart failure RN pressure is increased to 20  He is on Bumex 6 mg 3 times daily as well as metolazone twice weekly  Continue on Bumex drip  Continue metoprolol  Empagliflozin 10 mg daily  Monitor and replete electrolytes  Cardiology following  Low-salt diet  Patient clinically improving is making good urine output      Morbid obesity (HCC)  Therapeutic lifestyle modification encouraged  VICKY (obstructive sleep apnea)  Patient reports his CPAP machine is on recall.  Outpatient sleep study encouraged  CPAP compliance encouraged  Paroxysmal atrial fibrillation (HCC)  Continue metoprolol XL 50 mg p.o. daily  Eliquis anticoagulation  Uncontrolled type 2 diabetes mellitus with hyperglycemia (HCC)  Lab Results   Component Value Date    HGBA1C 7.3 (H) 11/23/2024       Recent Labs     11/24/24  1728 11/24/24  2130 11/25/24  0812 11/25/24  1134   POCGLU 154* 194* 131 170*       Blood Sugar Average: Last 72 hrs:  (P) 161.625  Diabetes mellitus type 2 uncontrolled  Hold sitagliptin while inpatient  Lantus 15 units daily  Titrate insulin dose based on Accu-Cheks  Outpatient endocrinology follow-up for better diabetes control encouraged  Monitor Accu-Cheks  Avoidable glycemia    Primary hypertension  Blood pressure is at goal  Continue metoprolol 50 mg daily  Hyperlipidemia  Continue atorvastatin  Presence of CardioMEMS HF system      VTE Pharmacologic Prophylaxis: VTE Score: 4 Moderate Risk (Score 3-4) - Pharmacological DVT Prophylaxis Ordered:  apixaban (Eliquis).    Mobility:   Basic Mobility Inpatient Raw Score: 24  JH-HLM Goal: 8: Walk 250 feet or more  JH-HLM Achieved: 7: Walk 25 feet or more  JH-HLM Goal NOT achieved. Continue with multidisciplinary rounding and encourage appropriate mobility to improve upon JH-HLM goals.    Patient Centered Rounds: I performed bedside rounds with nursing staff today.   Discussions with Specialists or Other Care Team Provider: Cardiology    Education and Discussions with Family / Patient: Patient declined call to .     Current Length of Stay: 2 day(s)  Current Patient Status: Inpatient   Certification Statement: The patient will continue to require additional inpatient hospital stay due to CHF exacerbation   Discharge Plan: Anticipate discharge in 48-72 hrs to home.    Code Status: Level 1 - Full Code    Subjective   This is a very pleasant 59 y.o. male who was seen today at bedside. Patient has no new complaints. Patient is not in any acute distress.       Objective :  Temp:  [97.7 °F (36.5 °C)-98.4 °F (36.9 °C)] 98 °F (36.7 °C)  HR:  [66-85] 73  BP: (120-138)/(72-83) 133/76  Resp:  [18-20] 18  SpO2:  [95 %-100 %] 97 %  O2 Device: None (Room air)    Body mass index is 41.66 kg/m².     Input and Output Summary (last 24 hours):     Intake/Output Summary (Last 24 hours) at 11/25/2024 1204  Last data filed at 11/25/2024 0900  Gross per 24 hour   Intake 1362 ml   Output 3080 ml   Net -1718 ml       Physical Exam  Vitals reviewed.   HENT:      Head: Normocephalic.      Nose: No congestion.      Mouth/Throat:      Pharynx: No oropharyngeal exudate or posterior oropharyngeal erythema.   Eyes:      Conjunctiva/sclera: Conjunctivae normal.   Cardiovascular:      Rate and Rhythm: Normal rate and regular rhythm.   Pulmonary:      Effort: Pulmonary effort is normal.      Breath sounds: Rales present.   Abdominal:      General: Abdomen is flat.      Palpations: Abdomen is soft.   Musculoskeletal:      Right lower  leg: Edema present.      Left lower leg: Edema present.   Skin:     General: Skin is warm and dry.   Neurological:      Mental Status: He is alert and oriented to person, place, and time. Mental status is at baseline.           Lines/Drains:        Telemetry:  Telemetry Orders (From admission, onward)               24 Hour Telemetry Monitoring  Continuous x 24 Hours (Telem)        Question:  Reason for 24 Hour Telemetry  Answer:  Decompensated CHF- and any one of the following: continuous diuretic infusion or total diuretic dose >200 mg daily, associated electrolyte derangement (I.e. K < 3.0), ionotropic drip (continuous infusion), hx of ventricular arrhythmia, or new EF < 35%                     Telemetry Reviewed: Normal Sinus Rhythm  Indication for Continued Telemetry Use: Acute CHF on >200 mg lasix/day or equivalent dose or with new reduced EF.                Lab Results: I have reviewed the following results:   Results from last 7 days   Lab Units 11/25/24  0451   WBC Thousand/uL 7.16   HEMOGLOBIN g/dL 11.5*   HEMATOCRIT % 36.6   PLATELETS Thousands/uL 220   SEGS PCT % 70   LYMPHO PCT % 15   MONO PCT % 7   EOS PCT % 6     Results from last 7 days   Lab Units 11/25/24  0528 11/24/24  0653 11/23/24  1508   SODIUM mmol/L 139   < > 137   POTASSIUM mmol/L 3.9   < > 3.9   CHLORIDE mmol/L 98   < > 101   CO2 mmol/L 29   < > 21   BUN mg/dL 26*   < > 21   CREATININE mg/dL 1.76*   < > 1.27   ANION GAP mmol/L 12   < > 15*   CALCIUM mg/dL 8.4   < > 7.8*   ALBUMIN g/dL  --   --  3.8   TOTAL BILIRUBIN mg/dL  --   --  0.46   ALK PHOS U/L  --   --  108*   ALT U/L  --   --  5*   AST U/L  --   --  10*   GLUCOSE RANDOM mg/dL 143*   < > 151*    < > = values in this interval not displayed.         Results from last 7 days   Lab Units 11/25/24  1134 11/25/24  0812 11/24/24  2130 11/24/24  1728 11/24/24  1137 11/24/24  0815 11/23/24  2047 11/23/24  1743   POC GLUCOSE mg/dl 170* 131 194* 154* 184* 138 180* 142*     Results from last 7  days   Lab Units 11/23/24  1508   HEMOGLOBIN A1C % 7.3*           Recent Cultures (last 7 days):         Imaging Results Review: No pertinent imaging studies reviewed.  Other Study Results Review: No additional pertinent studies reviewed.    Last 24 Hours Medication List:     Current Facility-Administered Medications:     acetaminophen (TYLENOL) tablet 650 mg, Q6H PRN    albuterol (PROVENTIL HFA,VENTOLIN HFA) inhaler 1 puff, Q4H PRN    aluminum-magnesium hydroxide-simethicone (MAALOX) oral suspension 15 mL, Q4H PRN    apixaban (ELIQUIS) tablet 5 mg, BID    atorvastatin (LIPITOR) tablet 10 mg, Daily    budesonide-formoterol (SYMBICORT) 160-4.5 mcg/act inhaler 2 puff, BID    bumetanide (BUMEX) 12.5 mg infusion 50 mL, Continuous, Last Rate: 1 mg/hr (11/25/24 0859)    cyanocobalamin (VITAMIN B-12) tablet 1,000 mcg, Daily    Empagliflozin (JARDIANCE) tablet 10 mg, Daily    famotidine (PEPCID) tablet 20 mg, BID    fluticasone (FLONASE) 50 mcg/act nasal spray 1 spray, BID    insulin glargine (LANTUS) subcutaneous injection 15 Units 0.15 mL, HS    insulin lispro (HumALOG/ADMELOG) 100 units/mL subcutaneous injection 1-5 Units, TID AC **AND** Fingerstick Glucose (POCT), TID AC    loratadine (CLARITIN) tablet 10 mg, Daily    metolazone (ZAROXOLYN) tablet 5 mg, Once per day on Monday Thursday    metoprolol succinate (TOPROL-XL) 24 hr tablet 50 mg, Daily    montelukast (SINGULAIR) tablet 10 mg, HS    nitroglycerin (NITROSTAT) SL tablet 0.4 mg, Q5 Min PRN    polyethylene glycol (MIRALAX) packet 17 g, Daily    potassium chloride (Klor-Con M20) CR tablet 40 mEq, TID    pregabalin (LYRICA) capsule 50 mg, HS    umeclidinium 62.5 mcg/actuation inhaler AEPB 1 puff, Daily    Administrative Statements   Today, Patient Was Seen By: Roly Parker MD  I have spent a total time of 45 minutes in caring for this patient on the day of the visit/encounter including Diagnostic results, Risks and benefits of tx options, Impressions,  Documenting in the medical record, Obtaining or reviewing history  , and Communicating with other healthcare professionals .    **Please Note: This note may have been constructed using a voice recognition system.**

## 2024-11-25 NOTE — ASSESSMENT & PLAN NOTE
Wt Readings from Last 3 Encounters:   11/25/24 (!) 143 kg (315 lb 12.8 oz)   10/29/24 (!) 145 kg (319 lb)   10/10/24 (!) 145 kg (319 lb)       Acute on chronic diastolic congestive heart failure  Patient has a cardiac sensor and was told by his heart failure RN pressure is increased to 20  He is on Bumex 6 mg 3 times daily as well as metolazone twice weekly  Continue on Bumex drip  Continue metoprolol  Empagliflozin 10 mg daily  Monitor and replete electrolytes  Cardiology following  Low-salt diet  Patient clinically improving is making good urine output

## 2024-11-25 NOTE — ASSESSMENT & PLAN NOTE
Etiology: Atrial fibrillation/metabolic syndrome  Last echo from 6/20/2024 shows an EF of 55%  Prior echoes also noted grade 2 diastolic dysfunction and dilated left atrium    Patient is s/p CardioMEMS, last few readings have been elevated between 11/15-11/22 (14-18).    Patient's dry weight at home is 319 LBS  Weight today is 315 LBS.    Baseline cr 1.5-2. 1.7 today    Patient is net -3L since admission  CardioMEMS on my interrogation today shows a mean pressure of 9.    Plan:  Continue Bumex drip for today  Transition to home Bumex dose starting tomorrow  Continue SGLT2i  Continue home metolazone twice weekly.   Monitor a.m. BMP

## 2024-11-25 NOTE — PROGRESS NOTES
Progress Note - Heart Failure   Name: Mesfin Cano 59 y.o. male I MRN: [id number]  Unit/Bed#: [facility] 410-01 I Date of Admission: 11/23/2024   Date of Service: 11/25/2024 I Hospital Day: 2    Assessment & Plan  Acute on chronic HFpEF, NYHA III, stage C  Etiology: Atrial fibrillation/metabolic syndrome  Last echo from 6/20/2024 shows an EF of 55%  Prior echoes also noted grade 2 diastolic dysfunction and dilated left atrium    Patient is s/p CardioMEMS, last few readings have been elevated between 11/15-11/22 (14-18).    Patient's dry weight at home is 319 LBS  Weight today is 315 LBS.    Baseline cr 1.5-2. 1.7 today    Patient is net -3L since admission  CardioMEMS on my interrogation today shows a mean pressure of 9.    Plan:  Continue Bumex drip for today  Transition to home Bumex dose starting tomorrow  Continue SGLT2i  Continue home metolazone twice weekly.   Monitor a.m. BMP    Paroxysmal atrial fibrillation (HCC)  On Toprol-XL 50 mg daily and Eliquis  Hyperlipidemia  Continue atorvastatin 10 mg daily  Uncontrolled type 2 diabetes mellitus with hyperglycemia (HCC)  Last A1c of 7.3.  On insulin  Primary hypertension    Morbid obesity (HCC)    VICKY (obstructive sleep apnea)    Presence of CardioMEMS HF system      Subjective    No acute overnight events. Has had good response to bumex drip.       Objective :  Temp:  [97.7 °F (36.5 °C)-98.6 °F (37 °C)] 98.6 °F (37 °C)  HR:  [66-85] 81  BP: (120-136)/(72-83) 126/74  Resp:  [18-20] 18  SpO2:  [96 %-100 %] 96 %  O2 Device: None (Room air)  Orthostatic Blood Pressures      Flowsheet Row Most Recent Value   Blood Pressure 126/74 filed at 11/25/2024 1506   Patient Position - Orthostatic VS Sitting filed at 11/25/2024 1506          First Weight: Weight - Scale: (!) 146 kg (321 lb 3.2 oz) (11/23/24 2100)  Vitals:    11/23/24 2100 11/25/24 0500   Weight: (!) 146 kg (321 lb 3.2 oz) (!) 143 kg (315 lb 12.8 oz)     Physical Exam  Cardiovascular:      Rate and Rhythm: Normal rate  and regular rhythm.   Pulmonary:      Effort: Pulmonary effort is normal. No respiratory distress.      Breath sounds: No wheezing or rales.   Musculoskeletal:      Right lower leg: Edema present.      Left lower leg: Edema present.   Skin:     General: Skin is warm.   Neurological:      General: No focal deficit present.      Mental Status: He is alert.           Lab Results: I have reviewed the following results:CBC/BMP:   .     11/25/24  0451 11/25/24  0528   WBC 7.16  --    HGB 11.5*  --    HCT 36.6  --      --    SODIUM  --  139   K  --  3.9   CL  --  98   CO2  --  29   BUN  --  26*   CREATININE  --  1.76*   GLUC  --  143*   MG 2.2  --     , Creatinine Clearance: Estimated Creatinine Clearance: 68.8 mL/min (A) (by C-G formula based on SCr of 1.76 mg/dL (H))., Lactic Acid: No new results in last 24 hours.   Results from last 7 days   Lab Units 11/25/24  0451 11/24/24  0653 11/23/24  1508   WBC Thousand/uL 7.16 7.66 8.54   HEMOGLOBIN g/dL 11.5* 12.2 11.8*   HEMATOCRIT % 36.6 38.2 36.8   PLATELETS Thousands/uL 220 225 238     Results from last 7 days   Lab Units 11/25/24  0528 11/24/24  1802 11/24/24  0653   POTASSIUM mmol/L 3.9 4.0 4.2   CHLORIDE mmol/L 98 95* 97   CO2 mmol/L 29 26 28   BUN mg/dL 26* 25 23   CREATININE mg/dL 1.76* 1.66* 1.54*   CALCIUM mg/dL 8.4 8.9 8.6         Lab Results   Component Value Date    HGBA1C 7.3 (H) 11/23/2024     Lab Results   Component Value Date    TROPONINI <0.02 09/22/2021           VTE Pharmacologic Prophylaxis: VTE covered by:  apixaban, Oral, 5 mg at 11/25/24 0916     VTE Mechanical Prophylaxis: sequential compression device

## 2024-11-25 NOTE — ASSESSMENT & PLAN NOTE
Lab Results   Component Value Date    HGBA1C 7.3 (H) 11/23/2024       Recent Labs     11/24/24  1728 11/24/24  2130 11/25/24  0812 11/25/24  1134   POCGLU 154* 194* 131 170*       Blood Sugar Average: Last 72 hrs:  (P) 161.625  Diabetes mellitus type 2 uncontrolled  Hold sitagliptin while inpatient  Lantus 15 units daily  Titrate insulin dose based on Accu-Cheks  Outpatient endocrinology follow-up for better diabetes control encouraged  Monitor Accu-Cheks  Avoidable glycemia

## 2024-11-25 NOTE — TELEPHONE ENCOUNTER
FYI:  Patient called office to inform he has not taken CarioMem's since being admitted to the hospital on Saturday. Stated he was going down hill on Saturday afternoon, 11/23, & went to ED, then admitted.     Patient stated his new dry weight is 315 lb.  Stated he possibly will be D/C tomorrow.

## 2024-11-26 LAB
ANION GAP SERPL CALCULATED.3IONS-SCNC: 10 MMOL/L (ref 4–13)
ANION GAP SERPL CALCULATED.3IONS-SCNC: 12 MMOL/L (ref 4–13)
BASOPHILS # BLD AUTO: 0.04 THOUSANDS/ΜL (ref 0–0.1)
BASOPHILS NFR BLD AUTO: 1 % (ref 0–1)
BUN SERPL-MCNC: 31 MG/DL (ref 5–25)
BUN SERPL-MCNC: 33 MG/DL (ref 5–25)
CALCIUM SERPL-MCNC: 8.3 MG/DL (ref 8.4–10.2)
CALCIUM SERPL-MCNC: 8.4 MG/DL (ref 8.4–10.2)
CHLORIDE SERPL-SCNC: 95 MMOL/L (ref 96–108)
CHLORIDE SERPL-SCNC: 97 MMOL/L (ref 96–108)
CO2 SERPL-SCNC: 26 MMOL/L (ref 21–32)
CO2 SERPL-SCNC: 30 MMOL/L (ref 21–32)
CREAT SERPL-MCNC: 1.8 MG/DL (ref 0.6–1.3)
CREAT SERPL-MCNC: 2.25 MG/DL (ref 0.6–1.3)
EOSINOPHIL # BLD AUTO: 0.51 THOUSAND/ΜL (ref 0–0.61)
EOSINOPHIL NFR BLD AUTO: 7 % (ref 0–6)
ERYTHROCYTE [DISTWIDTH] IN BLOOD BY AUTOMATED COUNT: 17.2 % (ref 11.6–15.1)
GFR SERPL CREATININE-BSD FRML MDRD: 31 ML/MIN/1.73SQ M
GFR SERPL CREATININE-BSD FRML MDRD: 40 ML/MIN/1.73SQ M
GLUCOSE SERPL-MCNC: 143 MG/DL (ref 65–140)
GLUCOSE SERPL-MCNC: 151 MG/DL (ref 65–140)
GLUCOSE SERPL-MCNC: 159 MG/DL (ref 65–140)
GLUCOSE SERPL-MCNC: 203 MG/DL (ref 65–140)
GLUCOSE SERPL-MCNC: 208 MG/DL (ref 65–140)
GLUCOSE SERPL-MCNC: 225 MG/DL (ref 65–140)
HCT VFR BLD AUTO: 34.6 % (ref 36.5–49.3)
HGB BLD-MCNC: 10.9 G/DL (ref 12–17)
IMM GRANULOCYTES # BLD AUTO: 0.05 THOUSAND/UL (ref 0–0.2)
IMM GRANULOCYTES NFR BLD AUTO: 1 % (ref 0–2)
LYMPHOCYTES # BLD AUTO: 1.35 THOUSANDS/ΜL (ref 0.6–4.47)
LYMPHOCYTES NFR BLD AUTO: 18 % (ref 14–44)
MAGNESIUM SERPL-MCNC: 2.3 MG/DL (ref 1.9–2.7)
MCH RBC QN AUTO: 28.6 PG (ref 26.8–34.3)
MCHC RBC AUTO-ENTMCNC: 31.5 G/DL (ref 31.4–37.4)
MCV RBC AUTO: 91 FL (ref 82–98)
MONOCYTES # BLD AUTO: 0.71 THOUSAND/ΜL (ref 0.17–1.22)
MONOCYTES NFR BLD AUTO: 10 % (ref 4–12)
NEUTROPHILS # BLD AUTO: 4.85 THOUSANDS/ΜL (ref 1.85–7.62)
NEUTS SEG NFR BLD AUTO: 63 % (ref 43–75)
NRBC BLD AUTO-RTO: 0 /100 WBCS
PLATELET # BLD AUTO: 231 THOUSANDS/UL (ref 149–390)
PMV BLD AUTO: 10.4 FL (ref 8.9–12.7)
POTASSIUM SERPL-SCNC: 3.4 MMOL/L (ref 3.5–5.3)
POTASSIUM SERPL-SCNC: 4.9 MMOL/L (ref 3.5–5.3)
RBC # BLD AUTO: 3.81 MILLION/UL (ref 3.88–5.62)
SODIUM SERPL-SCNC: 133 MMOL/L (ref 135–147)
SODIUM SERPL-SCNC: 137 MMOL/L (ref 135–147)
WBC # BLD AUTO: 7.51 THOUSAND/UL (ref 4.31–10.16)

## 2024-11-26 PROCEDURE — 99232 SBSQ HOSP IP/OBS MODERATE 35: CPT | Performed by: STUDENT IN AN ORGANIZED HEALTH CARE EDUCATION/TRAINING PROGRAM

## 2024-11-26 PROCEDURE — 80048 BASIC METABOLIC PNL TOTAL CA: CPT | Performed by: STUDENT IN AN ORGANIZED HEALTH CARE EDUCATION/TRAINING PROGRAM

## 2024-11-26 PROCEDURE — 82948 REAGENT STRIP/BLOOD GLUCOSE: CPT

## 2024-11-26 PROCEDURE — 85025 COMPLETE CBC W/AUTO DIFF WBC: CPT | Performed by: FAMILY MEDICINE

## 2024-11-26 PROCEDURE — 83735 ASSAY OF MAGNESIUM: CPT | Performed by: FAMILY MEDICINE

## 2024-11-26 PROCEDURE — 99232 SBSQ HOSP IP/OBS MODERATE 35: CPT | Performed by: PHYSICIAN ASSISTANT

## 2024-11-26 RX ORDER — POTASSIUM CHLORIDE 1500 MG/1
40 TABLET, EXTENDED RELEASE ORAL 3 TIMES DAILY
Status: DISCONTINUED | OUTPATIENT
Start: 2024-11-27 | End: 2024-11-29 | Stop reason: HOSPADM

## 2024-11-26 RX ORDER — POTASSIUM CHLORIDE 1500 MG/1
60 TABLET, EXTENDED RELEASE ORAL
Status: COMPLETED | OUTPATIENT
Start: 2024-11-26 | End: 2024-11-27

## 2024-11-26 RX ORDER — ECHINACEA PURPUREA EXTRACT 125 MG
1 TABLET ORAL
Status: DISCONTINUED | OUTPATIENT
Start: 2024-11-26 | End: 2024-11-29 | Stop reason: HOSPADM

## 2024-11-26 RX ORDER — PREGABALIN 75 MG/1
75 CAPSULE ORAL
Status: DISCONTINUED | OUTPATIENT
Start: 2024-11-26 | End: 2024-11-29 | Stop reason: HOSPADM

## 2024-11-26 RX ADMIN — LORATADINE 10 MG: 10 TABLET ORAL at 08:45

## 2024-11-26 RX ADMIN — METOPROLOL SUCCINATE 50 MG: 50 TABLET, EXTENDED RELEASE ORAL at 08:45

## 2024-11-26 RX ADMIN — INSULIN LISPRO 1 UNITS: 100 INJECTION, SOLUTION INTRAVENOUS; SUBCUTANEOUS at 17:28

## 2024-11-26 RX ADMIN — ATORVASTATIN CALCIUM 10 MG: 10 TABLET, FILM COATED ORAL at 08:45

## 2024-11-26 RX ADMIN — Medication 1 MG/HR: at 13:53

## 2024-11-26 RX ADMIN — POTASSIUM CHLORIDE 60 MEQ: 1500 TABLET, EXTENDED RELEASE ORAL at 17:28

## 2024-11-26 RX ADMIN — BUDESONIDE AND FORMOTEROL FUMARATE DIHYDRATE 2 PUFF: 160; 4.5 AEROSOL RESPIRATORY (INHALATION) at 08:48

## 2024-11-26 RX ADMIN — POTASSIUM CHLORIDE 60 MEQ: 1500 TABLET, EXTENDED RELEASE ORAL at 13:09

## 2024-11-26 RX ADMIN — INSULIN LISPRO 2 UNITS: 100 INJECTION, SOLUTION INTRAVENOUS; SUBCUTANEOUS at 13:11

## 2024-11-26 RX ADMIN — UMECLIDINIUM 1 PUFF: 62.5 AEROSOL, POWDER ORAL at 08:48

## 2024-11-26 RX ADMIN — POTASSIUM CHLORIDE 40 MEQ: 1500 TABLET, EXTENDED RELEASE ORAL at 08:45

## 2024-11-26 RX ADMIN — PREGABALIN 75 MG: 75 CAPSULE ORAL at 21:52

## 2024-11-26 RX ADMIN — APIXABAN 5 MG: 5 TABLET, FILM COATED ORAL at 08:46

## 2024-11-26 RX ADMIN — BUDESONIDE AND FORMOTEROL FUMARATE DIHYDRATE 2 PUFF: 160; 4.5 AEROSOL RESPIRATORY (INHALATION) at 17:29

## 2024-11-26 RX ADMIN — MONTELUKAST 10 MG: 10 TABLET, FILM COATED ORAL at 21:52

## 2024-11-26 RX ADMIN — FAMOTIDINE 20 MG: 20 TABLET, FILM COATED ORAL at 08:46

## 2024-11-26 RX ADMIN — Medication 1 MG/HR: at 01:09

## 2024-11-26 RX ADMIN — INSULIN GLARGINE 15 UNITS: 100 INJECTION, SOLUTION SUBCUTANEOUS at 21:52

## 2024-11-26 RX ADMIN — INSULIN LISPRO 1 UNITS: 100 INJECTION, SOLUTION INTRAVENOUS; SUBCUTANEOUS at 08:44

## 2024-11-26 RX ADMIN — EMPAGLIFLOZIN 10 MG: 10 TABLET, FILM COATED ORAL at 08:46

## 2024-11-26 RX ADMIN — APIXABAN 5 MG: 5 TABLET, FILM COATED ORAL at 17:28

## 2024-11-26 RX ADMIN — FAMOTIDINE 20 MG: 20 TABLET, FILM COATED ORAL at 17:28

## 2024-11-26 RX ADMIN — CYANOCOBALAMIN TAB 500 MCG 1000 MCG: 500 TAB at 08:46

## 2024-11-26 NOTE — PROGRESS NOTES
"          Advanced Heart Failure / Pulmonary Hypertension Service Progress Note    Mesfin Cano 59 y.o. male   MRN: [id number]  Unit/Bed#: [facility] 410-01; Encounter: [id number]    Assessment:  Principal Problem:    Acute on chronic HFpEF, NYHA III, stage C  Active Problems:    Primary hypertension    Hyperlipidemia    Morbid obesity (HCC)    VICKY (obstructive sleep apnea)    Paroxysmal atrial fibrillation (HCC)    Uncontrolled type 2 diabetes mellitus with hyperglycemia (HCC)    Presence of CardioMEMS HF system    Subjective:   Patient seen and examined. No significant events overnight. Feeling better. Denies SOB at rest and PND. Ambulating halls without issue. Feels his symptoms are very near his baseline now.     Objective:   Intake/ Output: 977 mL / 2700 mL.  Weight: 316 lbs (315 lbs on 11/25).  Telemetry: NSR, rates in 60s.  MAPs: .    Today's Plan:  Continue Bumex drip. May be able to transition to PO in next 24-36 hours.    Did not receive metolazone yesterday (patient prefers to take on Wed/Sat schedule).  Potassium 3.4 this AM; goal >4. PO supplementation ordered. Mg WNL this AM.   CardioMEMS interrogation 11/25 with \"mean pressure of 9.\" Tentative plan to interrogate again tomorrow.   Spironolactone was discontinued during 10/2024 admission. However, patient continued taking med s/p discharge; stopped on 10/29. Continue off med at this time. Can consider adding back as outpatient.   Continue on telemetry with BID BMPs while on Bumex drip.     Plan:  Acute on chronic HFpEF; LVEF 55%; LVIDd 6.0 cm              Etiology: Afib, HTN, obesity phenotype.              RHC / MEMS calibration 10/11/2023: CardioMEMS data correlates to RHC.               TTE 06/20/2024: LVEF 55%. Normal RV. BRIAN (L>R). Normal IVC.      Guideline Directed Medical Therapy:  --Aldosterone Antagonist: No (stopped during 10/2024 admission).   --SGLT2 Inhibitor: empagliflozin 10 mg daily.      Volume Management:  --Home Diuretic: Bumex 6 mg " "TID with metolazone 5 mg twice weekly (Wed/Sat).   --Inpatient Diuretic: IV Bumex 1 mg/hr.   --K supplementation: potassium 40 mEq TID.                  Advanced Therapies:              --CardioMEMS: implanted 03/09/2022. Goal PAd of 12 mmHg.     Atrial fibrillation, parosxysmal              [id number] = 3 (HF, HTN, DM).              Anticoagulation on Eliquis.               Rate control: metoprolol succinate 50 mg daily.              Rhythm control: No.     Chronic kidney disease, stage IIIb              Baseline creatinine of 1.5-2.0.              Today, creatinine of 1.80 (1.80 on 11/25).      Hypertension  Hyperlipidemia  Asthma, severe persistent: follows with Dr. Noonan as outpatient.   Obstructive sleep apnea  Diabetes mellitus, type II  History of gastric bypass (Samuel-en-Y) surgery   History of tobacco abuse    Vitals:   Blood pressure 119/69, pulse 92, temperature 98.4 °F (36.9 °C), temperature source Oral, resp. rate 20, height 6' 1\" (1.854 m), weight (!) 144 kg (316 lb 9.3 oz), SpO2 96%.    I/O last 3 completed shifts:  In: 1937.7 [P.O.:1800; I.V.:137.7]  Out: 4350 [Urine:4350]    Weight (last 2 days)       Date/Time Weight    11/26/24 0830 144 (316.58)    11/25/24 0500 143 (315.8)          Wt Readings from Last 10 Encounters:   11/26/24 (!) 144 kg (316 lb 9.3 oz)   10/29/24 (!) 145 kg (319 lb)   10/10/24 (!) 145 kg (319 lb)   10/03/24 (!) 150 kg (331 lb 11.2 oz)   10/01/24 (!) 147 kg (324 lb)   08/20/24 (!) 144 kg (317 lb)   08/08/24 (!) 145 kg (319 lb)   08/08/24 (!) 149 kg (328 lb 3.2 oz)   08/04/24 (!) 144 kg (318 lb 6.4 oz)   07/19/24 (!) 150 kg (330 lb)     Vitals:    11/25/24 1923 11/25/24 2251 11/26/24 0243 11/26/24 0830   BP: 147/79 131/79 113/54 119/69   BP Location: Right arm Right arm Right arm Right arm   Pulse: 80 91 75 92   Resp: 18 18 18 20   Temp: 98 °F (36.7 °C) 98 °F (36.7 °C) 98.2 °F (36.8 °C) 98.4 °F (36.9 °C)   TempSrc: Oral Oral Oral Oral   SpO2: 97% 96% 97% 96%   Weight:    (!) 144 " kg (316 lb 9.3 oz)   Height:           Physical Exam  Vitals reviewed.   Constitutional:       General: He is awake. He is not in acute distress.     Appearance: Normal appearance. He is well-developed and overweight. He is not ill-appearing, toxic-appearing or diaphoretic.   HENT:      Head: Normocephalic.      Nose: Nose normal.      Mouth/Throat:      Mouth: Mucous membranes are moist.   Eyes:      General: No scleral icterus.     Conjunctiva/sclera: Conjunctivae normal.   Neck:      Vascular: JVD present.   Cardiovascular:      Rate and Rhythm: Normal rate and regular rhythm.   Pulmonary:      Effort: Pulmonary effort is normal. No tachypnea, bradypnea, accessory muscle usage or respiratory distress.      Breath sounds: Normal air entry. No decreased air movement. No rales.   Abdominal:      General: There is distension (trace).      Palpations: Abdomen is soft.      Tenderness: There is no abdominal tenderness.   Musculoskeletal:      Cervical back: Neck supple.      Right lower leg: Edema present.      Left lower leg: Edema present.   Skin:     General: Skin is warm and dry.      Coloration: Skin is pale. Skin is not jaundiced.   Neurological:      Mental Status: He is alert and oriented to person, place, and time.   Psychiatric:         Attention and Perception: Attention normal.         Mood and Affect: Mood normal.         Speech: Speech normal.         Behavior: Behavior normal. Behavior is cooperative.         Thought Content: Thought content normal.           Current Facility-Administered Medications:     acetaminophen (TYLENOL) tablet 650 mg, 650 mg, Oral, Q6H PRN, Arpan Coppola MD    albuterol (PROVENTIL HFA,VENTOLIN HFA) inhaler 1 puff, 1 puff, Inhalation, Q4H PRN, Arpan Coppola MD    aluminum-magnesium hydroxide-simethicone (MAALOX) oral suspension 15 mL, 15 mL, Oral, Q4H PRN, Arpan Coppola MD    apixaban (ELIQUIS) tablet 5 mg, 5 mg, Oral, BID, Arpan Coppola,  MD, 5 mg at 11/26/24 0846    atorvastatin (LIPITOR) tablet 10 mg, 10 mg, Oral, Daily, Arpan Coppola MD, 10 mg at 11/26/24 0845    budesonide-formoterol (SYMBICORT) 160-4.5 mcg/act inhaler 2 puff, 2 puff, Inhalation, BID, Arpan Coppola MD, 2 puff at 11/26/24 0848    bumetanide (BUMEX) 12.5 mg infusion 50 mL, 1 mg/hr, Intravenous, Continuous, Yoel Darden MD, Last Rate: 4 mL/hr at 11/26/24 0109, 1 mg/hr at 11/26/24 0109    cyanocobalamin (VITAMIN B-12) tablet 1,000 mcg, 1,000 mcg, Oral, Daily, Arpan Coppola MD, 1,000 mcg at 11/26/24 0846    Empagliflozin (JARDIANCE) tablet 10 mg, 10 mg, Oral, Daily, Arpan Coppola MD, 10 mg at 11/26/24 0846    famotidine (PEPCID) tablet 20 mg, 20 mg, Oral, BID, Arpan Coppola MD, 20 mg at 11/26/24 0846    fluticasone (FLONASE) 50 mcg/act nasal spray 1 spray, 1 spray, Nasal, BID, Arpan Coppola MD    insulin glargine (LANTUS) subcutaneous injection 15 Units 0.15 mL, 15 Units, Subcutaneous, HS, Arpan Coppola MD, 15 Units at 11/25/24 2157    insulin lispro (HumALOG/ADMELOG) 100 units/mL subcutaneous injection 1-5 Units, 1-5 Units, Subcutaneous, TID AC, 1 Units at 11/26/24 0844 **AND** Fingerstick Glucose (POCT), , , TID AC, Arpan Coppola MD    loratadine (CLARITIN) tablet 10 mg, 10 mg, Oral, Daily, Arpan Coppola MD, 10 mg at 11/26/24 0845    metolazone (ZAROXOLYN) tablet 5 mg, 5 mg, Oral, Once per day on Monday Thursday, Arpan Coppola MD    metoprolol succinate (TOPROL-XL) 24 hr tablet 50 mg, 50 mg, Oral, Daily, Arpan Coppola MD, 50 mg at 11/26/24 0845    montelukast (SINGULAIR) tablet 10 mg, 10 mg, Oral, HS, Arpan Coppola MD, 10 mg at 11/25/24 2157    nitroglycerin (NITROSTAT) SL tablet 0.4 mg, 0.4 mg, Sublingual, Q5 Min PRN, Arpan Coppola MD    polyethylene glycol (MIRALAX) packet 17 g, 17 g, Oral, Daily, Arpan Coppola MD    [START ON 11/27/2024]  potassium chloride (Klor-Con M20) CR tablet 40 mEq, 40 mEq, Oral, TID, Aster García PA-C    potassium chloride (Klor-Con M20) CR tablet 60 mEq, 60 mEq, Oral, TID With Meals, Aster García PA-C    pregabalin (LYRICA) capsule 50 mg, 50 mg, Oral, HS, Arpan Coppola MD, 50 mg at 11/25/24 2157    umeclidinium 62.5 mcg/actuation inhaler AEPB 1 puff, 1 puff, Inhalation, Daily, Arpan Coppola MD, 1 puff at 11/26/24 0848    Labs & Results:      Results from last 7 days   Lab Units 11/26/24  0457 11/25/24  0451 11/24/24  0653   WBC Thousand/uL 7.51 7.16 7.66   HEMOGLOBIN g/dL 10.9* 11.5* 12.2   HEMATOCRIT % 34.6* 36.6 38.2   PLATELETS Thousands/uL 231 220 225         Results from last 7 days   Lab Units 11/26/24  0810 11/25/24  1805 11/25/24  0528 11/24/24  0653 11/23/24  1508   POTASSIUM mmol/L 3.4* 4.1 3.9   < > 3.9   CHLORIDE mmol/L 97 94* 98   < > 101   CO2 mmol/L 30 27 29   < > 21   BUN mg/dL 31* 30* 26*   < > 21   CREATININE mg/dL 1.80* 1.80* 1.76*   < > 1.27   CALCIUM mg/dL 8.4 8.2* 8.4   < > 7.8*   ALK PHOS U/L  --   --   --   --  108*   ALT U/L  --   --   --   --  5*   AST U/L  --   --   --   --  10*    < > = values in this interval not displayed.         Aster García PA-C

## 2024-11-26 NOTE — ASSESSMENT & PLAN NOTE
Etiology: Atrial fibrillation/metabolic syndrome  Last echo from 6/20/2024 shows an EF of 55%  Prior echoes also noted grade 2 diastolic dysfunction and dilated left atrium    Patient is s/p CardioMEMS, last few readings have been elevated between 11/15-11/22 (14-18).    Patient's dry weight at home is 319 LBS  Weight today is 317 LBS    Baseline cr 1.5-2.  Creatinine trending up to 2.25 last night    Patient is net -6.5 L since admission, he put out 4.5 L yesterday    Home GDMT: Jardiance 10 mg daily, metoprolol XL 50 mg daily  Home diuretic regimen: Bumex 6 mg 3 times daily + Zaroxolyn twice weekly(Saturday and Wednesday)    Plan:  Patient is net -6.5 L since admission, his symptoms are near to baseline  Will stop Bumex drip  Restart home oral Bumex 6 mg tid  If potassium is stable today, will restart spironolactone  Plan on restarting metolazone possibly starting tomorrow  Monitor BMP

## 2024-11-26 NOTE — UTILIZATION REVIEW
"Continued Stay Review    Date: 11/26/24                           Current Patient Class: Inpatient  Current Level of Care: MS     HPI:59 y.o. male initially admitted on  11/26 . Acute on chronic heart failure .     Assessment/Plan: 11/26    Pt continues on Bumex drip , pt feeling near his baseline. Likely transition to PO diuresis in next 24-36 hours. Did not receive metolazone yesterday (patient prefers to take on Wed/Sat schedule).Monitoring lytes : Potassium 3.4 this AM; goal >4. Increased po PO supplementation today . Mg WNL  . Creat 1.8 today, was 1.8 yesterday  with baseline 1.5-2.0 . CardioMEMS interrogation 11/25 with \"mean pressure of 9.\" Tentative plan to interrogate again tomorrow. Telemetry- NSR in 60's. Continue on telemetry with BID BMPs while on Bumex drip. On exam, trace abdominal distention, edema BLE. JVD present. I/O - 3.3 L since admission . Wt 316 lbs   today from 315 yesterday .     Medications:   Scheduled Medications:  apixaban, 5 mg, Oral, BID  atorvastatin, 10 mg, Oral, Daily  budesonide-formoterol, 2 puff, Inhalation, BID  cyanocobalamin, 1,000 mcg, Oral, Daily  Empagliflozin, 10 mg, Oral, Daily  famotidine, 20 mg, Oral, BID  fluticasone, 1 spray, Nasal, BID  insulin glargine, 15 Units, Subcutaneous, HS  insulin lispro, 1-5 Units, Subcutaneous, TID AC  loratadine, 10 mg, Oral, Daily  metolazone, 5 mg, Oral, Once per day on Monday Thursday  metoprolol succinate, 50 mg, Oral, Daily  montelukast, 10 mg, Oral, HS  polyethylene glycol, 17 g, Oral, Daily  [START ON 11/27/2024] potassium chloride, 40 mEq, Oral, TID  potassium chloride, 60 mEq, Oral, TID With Meals  pregabalin, 50 mg, Oral, HS  umeclidinium, 1 puff, Inhalation, Daily    potassium chloride (Klor-Con M20) CR tablet 40 mEq  Dose: 40 mEq  Freq: 3 times daily Route: PO  Start: 11/23/24 1700 End: 11/26/24 0854  Continuous IV Infusions:  bumetanide (BUMEX) 12.5 mg infusion 50 mL, 1 mg/hr, Intravenous, Continuous      PRN " Meds:  acetaminophen, 650 mg, Oral, Q6H PRN  albuterol, 1 puff, Inhalation, Q4H PRN  aluminum-magnesium hydroxide-simethicone, 15 mL, Oral, Q4H PRN  nitroglycerin, 0.4 mg, Sublingual, Q5 Min PRN      Discharge Plan: TBD    Vital Signs (last 3 days)       Date/Time Temp Pulse Resp BP MAP (mmHg) SpO2 O2 Device Patient Position - Orthostatic VS Lilly Coma Scale Score Pain    11/26/24 1044 97.8 °F (36.6 °C) 70 18 129/73 94 95 % None (Room air) Lying -- --    11/26/24 0830 98.4 °F (36.9 °C) 92 20 119/69 90 96 % None (Room air) Lying -- --    11/26/24 0243 98.2 °F (36.8 °C) 75 18 113/54 77 97 % None (Room air) Lying -- --    11/25/24 2251 98 °F (36.7 °C) 91 18 131/79 100 96 % None (Room air) Lying -- --    11/25/24 2000 -- -- -- -- -- -- -- -- 15 No Pain    11/25/24 1923 98 °F (36.7 °C) 80 18 147/79 105 97 % None (Room air) Lying -- --    11/25/24 1506 98.6 °F (37 °C) 81 18 126/74 93 96 % None (Room air) Sitting -- --    11/25/24 1100 98 °F (36.7 °C) 73 18 133/76 99 97 % None (Room air) Sitting -- --    11/25/24 0830 -- -- -- -- -- -- -- -- 15 No Pain    11/25/24 0700 97.7 °F (36.5 °C) 66 18 120/74 91 100 % None (Room air) Sitting -- --    11/24/24 2254 97.9 °F (36.6 °C) 73 20 135/83 103 99 % None (Room air) Lying -- --    11/24/24 2055 -- -- -- -- -- -- -- -- -- No Pain    11/24/24 1956 98.4 °F (36.9 °C) 85 18 136/72 98 97 % None (Room air) Lying -- --    11/24/24 1542 98.3 °F (36.8 °C) 82 20 138/82 100 95 % -- Lying -- --    11/24/24 0800 -- -- -- -- -- -- None (Room air) -- 15 No Pain    11/24/24 0700 98 °F (36.7 °C) 73 -- 131/66 -- 98 % None (Room air) Lying -- --    11/24/24 0314 98.2 °F (36.8 °C) 74 -- 129/65 92 97 % None (Room air) Lying -- --    11/23/24 2300 98.1 °F (36.7 °C) 78 -- 151/73 105 99 % None (Room air) Sitting -- --    11/23/24 2135 98.2 °F (36.8 °C) 84 -- 139/74 100 99 % None (Room air) Lying 15 No Pain    11/23/24 2100 -- -- -- -- -- -- -- -- -- No Pain    11/23/24 1740 -- 75 12 122/68 -- 99 % None  (Room air) Sitting -- No Pain    11/23/24 1533 -- -- -- -- -- -- None (Room air) -- 15 --    11/23/24 1444 97.5 °F (36.4 °C) 81 18 127/78 -- 97 % None (Room air) Sitting -- No Pain          Weight (last 2 days)       Date/Time Weight    11/26/24 0830 144 (316.58)    11/25/24 0500 143 (315.8)          11/23/24 2100 146 kg (321 lb 3.2 oz) Abnormal        Pertinent Labs/Diagnostic Results:             Results from last 7 days   Lab Units 11/26/24  0457 11/25/24  0451 11/24/24  0653 11/23/24  1508   WBC Thousand/uL 7.51 7.16 7.66 8.54   HEMOGLOBIN g/dL 10.9* 11.5* 12.2 11.8*   HEMATOCRIT % 34.6* 36.6 38.2 36.8   PLATELETS Thousands/uL 231 220 225 238   TOTAL NEUT ABS Thousands/µL 4.85 5.04 5.33 6.55         Results from last 7 days   Lab Units 11/26/24  0810 11/26/24  0457 11/25/24  1805 11/25/24  0528 11/25/24  0451 11/24/24  1802 11/24/24  0653   SODIUM mmol/L 137  --  132* 139  --  136 136   POTASSIUM mmol/L 3.4*  --  4.1 3.9  --  4.0 4.2   CHLORIDE mmol/L 97  --  94* 98  --  95* 97   CO2 mmol/L 30  --  27 29  --  26 28   ANION GAP mmol/L 10  --  11 12  --  15* 11   BUN mg/dL 31*  --  30* 26*  --  25 23   CREATININE mg/dL 1.80*  --  1.80* 1.76*  --  1.66* 1.54*   EGFR ml/min/1.73sq m 40  --  40 41  --  45 49   CALCIUM mg/dL 8.4  --  8.2* 8.4  --  8.9 8.6   MAGNESIUM mg/dL  --  2.3  --   --  2.2  --  1.7*     Results from last 7 days   Lab Units 11/23/24  1508   AST U/L 10*   ALT U/L 5*   ALK PHOS U/L 108*   TOTAL PROTEIN g/dL 6.6   ALBUMIN g/dL 3.8   TOTAL BILIRUBIN mg/dL 0.46     Results from last 7 days   Lab Units 11/26/24  1107 11/26/24  0735 11/25/24  2110 11/25/24  1659 11/25/24  1134 11/25/24  0812 11/24/24  2130 11/24/24  1728 11/24/24  1137 11/24/24  0815 11/23/24  2047 11/23/24  1743   POC GLUCOSE mg/dl 208* 159* 194* 194* 170* 131 194* 154* 184* 138 180* 142*     Results from last 7 days   Lab Units 11/26/24  0810 11/25/24  1805 11/25/24  0528 11/24/24  1802 11/24/24  0653 11/23/24  1508   GLUCOSE RANDOM  mg/dL 143* 202* 143* 181* 124 151*         Results from last 7 days   Lab Units 11/23/24  1508   HEMOGLOBIN A1C % 7.3*   EAG mg/dl 163             Results from last 7 days   Lab Units 11/23/24  1736 11/23/24  1508   HS TNI 0HR ng/L  --  13   HS TNI 2HR ng/L 12  --    HSTNI D2 ng/L -1  --            Results from last 7 days   Lab Units 11/23/24  1508   BNP pg/mL 254*     Results from last 7 days   Lab Units 11/24/24  0653   FERRITIN ng/mL 46   IRON SATURATION % 30   IRON ug/dL 127   TIBC ug/dL 430             Network Utilization Review Department  ATTENTION: Please call with any questions or concerns to [phone] and carefully listen to the prompts so that you are directed to the right person. All voicemails are confidential.   For Discharge needs, contact Care Management DC Support Team at [phone] opt. 2  Send all requests for admission clinical reviews, approved or denied determinations and any other requests to dedicated fax number below belonging to the [place] where the patient is receiving treatment. List of dedicated fax numbers for the Facilities:  FACILITY NAME UR FAX NUMBER   ADMISSION DENIALS (Administrative/Medical Necessity) [phone]   DISCHARGE SUPPORT TEAM (NETWORK) [phone]   PARENT CHILD HEALTH (Maternity/NICU/Pediatrics) [phone]   [facility] [phone]   [facility] [phone]   [facility] [phone]   [facility] [phone]   [facility] [phone]   [facility] [phone]   [facility] [phone]   [facility] [phone]   [facility] [phone]   [facility] [phone]   [facility] [phone]   [facility] [phone]

## 2024-11-26 NOTE — PROGRESS NOTES
Progress Note - Hospitalist   Name: Mesfin Cano 59 y.o. male I MRN: [id number]  Unit/Bed#: [facility] 410-01 I Date of Admission: 11/23/2024   Date of Service: 11/26/2024 I Hospital Day: 3    Assessment & Plan  Acute on chronic HFpEF, NYHA III, stage C  Wt Readings from Last 3 Encounters:   11/26/24 (!) 144 kg (316 lb 9.3 oz)   10/29/24 (!) 145 kg (319 lb)   10/10/24 (!) 145 kg (319 lb)       Acute on chronic diastolic congestive heart failure  Patient has a cardiac sensor and was told by his heart failure RN pressure is increased to 20  Continue on Bumex drip  Continue metoprolol  Empagliflozin 10 mg daily  Monitor and replete electrolytes  Cardiology following  Low-salt diet  Patient clinically improving is making good urine output  Morbid obesity (HCC)  Therapeutic lifestyle modification encouraged  VICKY (obstructive sleep apnea)  Patient reports his CPAP machine is on recall.  Outpatient sleep study encouraged  CPAP compliance encouraged  Paroxysmal atrial fibrillation (HCC)  Continue metoprolol XL 50 mg p.o. daily  Eliquis anticoagulation  Uncontrolled type 2 diabetes mellitus with hyperglycemia (HCC)  Lab Results   Component Value Date    HGBA1C 7.3 (H) 11/23/2024       Recent Labs     11/25/24  1659 11/25/24  2110 11/26/24  0735 11/26/24  1107   POCGLU 194* 194* 159* 208*       Blood Sugar Average: Last 72 hrs:  (P) 170.3124475549766506  Diabetes mellitus type 2 uncontrolled  Hold sitagliptin while inpatient  Lantus 15 units daily  Titrate insulin dose based on Accu-Cheks  Outpatient endocrinology follow-up for better diabetes control encouraged  Monitor Accu-Cheks  Avoidable glycemia  Primary hypertension  Blood pressure is at goal  Continue metoprolol 50 mg daily  Hyperlipidemia  Continue atorvastatin  Presence of CardioMEMS HF system  Mgmt per HF     VTE Pharmacologic Prophylaxis: VTE Score: 4 Moderate Risk (Score 3-4) - Pharmacological DVT Prophylaxis Ordered: apixaban (Eliquis).    Mobility:   Basic Mobility  Inpatient Raw Score: 24  JH-HLM Goal: 8: Walk 250 feet or more  JH-HLM Achieved: 6: Walk 10 steps or more  JH-HLM Goal NOT achieved. Continue with multidisciplinary rounding and encourage appropriate mobility to improve upon JH-HLM goals.    Patient Centered Rounds: I performed bedside rounds with nursing staff today.   Discussions with Specialists or Other Care Team Provider: will d/w CM    Education and Discussions with Family / Patient: Patient declined call to .     Current Length of Stay: 3 day(s)  Current Patient Status: Inpatient   Certification Statement: The patient will continue to require additional inpatient hospital stay due to chf exacerbation on IV bumex gtt  Discharge Plan: Anticipate discharge in 48-72 hrs to home.    Code Status: Level 1 - Full Code    Subjective   No complaints, overall doing well though notes he thinks his neuropathy is getting worse and also c/o dry throat, throat clearing     Objective :  Temp:  [97.8 °F (36.6 °C)-98.6 °F (37 °C)] 97.8 °F (36.6 °C)  HR:  [70-92] 70  BP: (113-147)/(54-79) 129/73  Resp:  [18-20] 18  SpO2:  [95 %-97 %] 95 %  O2 Device: None (Room air)    Body mass index is 41.77 kg/m².     Input and Output Summary (last 24 hours):     Intake/Output Summary (Last 24 hours) at 11/26/2024 1503  Last data filed at 11/26/2024 1301  Gross per 24 hour   Intake 1037.73 ml   Output 4325 ml   Net -3287.27 ml       Physical Exam  Vitals reviewed.   Constitutional:       General: He is not in acute distress.     Appearance: He is not toxic-appearing.   HENT:      Head: Normocephalic and atraumatic.   Eyes:      Extraocular Movements: Extraocular movements intact.   Cardiovascular:      Rate and Rhythm: Normal rate and regular rhythm.   Pulmonary:      Effort: Pulmonary effort is normal. No respiratory distress.   Abdominal:      General: Bowel sounds are normal.      Palpations: Abdomen is soft.   Musculoskeletal:         General: Normal range of motion.    Neurological:      General: No focal deficit present.      Mental Status: He is alert and oriented to person, place, and time.   Psychiatric:         Mood and Affect: Mood normal.         Behavior: Behavior normal.         Thought Content: Thought content normal.         Lines/Drains:        Telemetry:  Telemetry Orders (From admission, onward)               24 Hour Telemetry Monitoring  Continuous x 24 Hours (Telem)        Question:  Reason for 24 Hour Telemetry  Answer:  Decompensated CHF- and any one of the following: continuous diuretic infusion or total diuretic dose >200 mg daily, associated electrolyte derangement (I.e. K < 3.0), ionotropic drip (continuous infusion), hx of ventricular arrhythmia, or new EF < 35%                     Telemetry Reviewed: Normal Sinus Rhythm  Indication for Continued Telemetry Use: Acute CHF on >200 mg lasix/day or equivalent dose or with new reduced EF.                Lab Results: I have reviewed the following results:   Results from last 7 days   Lab Units 11/26/24  0457   WBC Thousand/uL 7.51   HEMOGLOBIN g/dL 10.9*   HEMATOCRIT % 34.6*   PLATELETS Thousands/uL 231   SEGS PCT % 63   LYMPHO PCT % 18   MONO PCT % 10   EOS PCT % 7*     Results from last 7 days   Lab Units 11/26/24  0810 11/24/24  0653 11/23/24  1508   SODIUM mmol/L 137   < > 137   POTASSIUM mmol/L 3.4*   < > 3.9   CHLORIDE mmol/L 97   < > 101   CO2 mmol/L 30   < > 21   BUN mg/dL 31*   < > 21   CREATININE mg/dL 1.80*   < > 1.27   ANION GAP mmol/L 10   < > 15*   CALCIUM mg/dL 8.4   < > 7.8*   ALBUMIN g/dL  --   --  3.8   TOTAL BILIRUBIN mg/dL  --   --  0.46   ALK PHOS U/L  --   --  108*   ALT U/L  --   --  5*   AST U/L  --   --  10*   GLUCOSE RANDOM mg/dL 143*   < > 151*    < > = values in this interval not displayed.         Results from last 7 days   Lab Units 11/26/24  1107 11/26/24  0735 11/25/24  2110 11/25/24  1659 11/25/24  1134 11/25/24  0812 11/24/24  2130 11/24/24  1728 11/24/24  1137 11/24/24  0815  11/23/24 2047 11/23/24  1743   POC GLUCOSE mg/dl 208* 159* 194* 194* 170* 131 194* 154* 184* 138 180* 142*     Results from last 7 days   Lab Units 11/23/24  1508   HEMOGLOBIN A1C % 7.3*           Recent Cultures (last 7 days):         Imaging Results Review: No pertinent imaging studies reviewed.  Other Study Results Review: No additional pertinent studies reviewed.    Last 24 Hours Medication List:     Current Facility-Administered Medications:     acetaminophen (TYLENOL) tablet 650 mg, Q6H PRN    albuterol (PROVENTIL HFA,VENTOLIN HFA) inhaler 1 puff, Q4H PRN    aluminum-magnesium hydroxide-simethicone (MAALOX) oral suspension 15 mL, Q4H PRN    apixaban (ELIQUIS) tablet 5 mg, BID    atorvastatin (LIPITOR) tablet 10 mg, Daily    budesonide-formoterol (SYMBICORT) 160-4.5 mcg/act inhaler 2 puff, BID    bumetanide (BUMEX) 12.5 mg infusion 50 mL, Continuous, Last Rate: 1 mg/hr (11/26/24 1353)    cyanocobalamin (VITAMIN B-12) tablet 1,000 mcg, Daily    Empagliflozin (JARDIANCE) tablet 10 mg, Daily    famotidine (PEPCID) tablet 20 mg, BID    fluticasone (FLONASE) 50 mcg/act nasal spray 1 spray, BID    insulin glargine (LANTUS) subcutaneous injection 15 Units 0.15 mL, HS    insulin lispro (HumALOG/ADMELOG) 100 units/mL subcutaneous injection 1-5 Units, TID AC **AND** Fingerstick Glucose (POCT), TID AC    loratadine (CLARITIN) tablet 10 mg, Daily    metolazone (ZAROXOLYN) tablet 5 mg, Once per day on Monday Thursday    metoprolol succinate (TOPROL-XL) 24 hr tablet 50 mg, Daily    montelukast (SINGULAIR) tablet 10 mg, HS    nitroglycerin (NITROSTAT) SL tablet 0.4 mg, Q5 Min PRN    polyethylene glycol (MIRALAX) packet 17 g, Daily    [START ON 11/27/2024] potassium chloride (Klor-Con M20) CR tablet 40 mEq, TID    potassium chloride (Klor-Con M20) CR tablet 60 mEq, TID With Meals    pregabalin (LYRICA) capsule 75 mg, HS    sodium chloride (OCEAN) 0.65 % nasal spray 1 spray, Q1H PRN    umeclidinium 62.5 mcg/actuation inhaler  AEPB 1 puff, Daily    Administrative Statements   Today, Patient Was Seen By: Enedina Gray PA-C      **Please Note: This note may have been constructed using a voice recognition system.**

## 2024-11-26 NOTE — PROGRESS NOTES
Progress Note - Heart Failure   Name: Mesfin Cano 59 y.o. male I MRN: [id number]  Unit/Bed#: [facility] 410-01 I Date of Admission: 11/23/2024   Date of Service: 11/26/2024 I Hospital Day: 3    Assessment & Plan  Acute on chronic HFpEF, NYHA III, stage C  Etiology: Atrial fibrillation/metabolic syndrome  Last echo from 6/20/2024 shows an EF of 55%  Prior echoes also noted grade 2 diastolic dysfunction and dilated left atrium    Patient is s/p CardioMEMS, last few readings have been elevated between 11/15-11/22 (14-18).    Patient's dry weight at home is 319 LBS  Weight today is 317 LBS    Baseline cr 1.5-2.  Creatinine trending up to 2.25 last night    Patient is net -6.5 L since admission, he put out 4.5 L yesterday    Home GDMT: Jardiance 10 mg daily, metoprolol XL 50 mg daily  Home diuretic regimen: Bumex 6 mg 3 times daily + Zaroxolyn twice weekly(Saturday and Wednesday)    Plan:  Patient is net -6.5 L since admission, his symptoms are near to baseline  Will stop Bumex drip  Restart home oral Bumex 6 mg tid  If potassium is stable today, will restart spironolactone  Plan on restarting metolazone possibly starting tomorrow  Monitor BMP     Paroxysmal atrial fibrillation (HCC)  On Toprol-XL 50 mg daily and Eliquis  Hyperlipidemia  Continue atorvastatin 10 mg daily  Uncontrolled type 2 diabetes mellitus with hyperglycemia (HCC)  Last A1c of 7.3.  On insulin  Primary hypertension    Morbid obesity (HCC)    VICKY (obstructive sleep apnea)    Presence of CardioMEMS HF system      Subjective   No acute overnight events.  Patient feels close to baseline.    Objective :  Temp:  [97.5 °F (36.4 °C)-98.4 °F (36.9 °C)] 97.7 °F (36.5 °C)  HR:  [62-92] 62  BP: (119-148)/(65-78) 138/67  Resp:  [18-20] 18  SpO2:  [95 %-98 %] 98 %  O2 Device: None (Room air)  Orthostatic Blood Pressures      Flowsheet Row Most Recent Value   Blood Pressure 138/67 filed at 11/27/2024 0242   Patient Position - Orthostatic VS Lying filed at 11/27/2024 0242           First Weight: Weight - Scale: (!) 146 kg (321 lb 3.2 oz) (11/23/24 2100)  Vitals:    11/26/24 0830 11/27/24 0500   Weight: (!) 144 kg (316 lb 9.3 oz) (!) 144 kg (317 lb)     Physical Exam  Cardiovascular:      Rate and Rhythm: Normal rate and regular rhythm.   Pulmonary:      Effort: Pulmonary effort is normal. No respiratory distress.      Breath sounds: No wheezing or rales.   Musculoskeletal:      Right lower leg: Edema present.      Left lower leg: Edema present.      Comments: Trace   Skin:     General: Skin is warm.   Neurological:      General: No focal deficit present.      Mental Status: He is alert.           Lab Results: I have reviewed the following results:Creatinine Clearance: Estimated Creatinine Clearance: 54.3 mL/min (A) (by C-G formula based on SCr of 2.25 mg/dL (H)).  Results from last 7 days   Lab Units 11/26/24  0457 11/25/24  0451 11/24/24  0653   WBC Thousand/uL 7.51 7.16 7.66   HEMOGLOBIN g/dL 10.9* 11.5* 12.2   HEMATOCRIT % 34.6* 36.6 38.2   PLATELETS Thousands/uL 231 220 225     Results from last 7 days   Lab Units 11/26/24  1843 11/26/24  0810 11/25/24  1805   POTASSIUM mmol/L 4.9 3.4* 4.1   CHLORIDE mmol/L 95* 97 94*   CO2 mmol/L 26 30 27   BUN mg/dL 33* 31* 30*   CREATININE mg/dL 2.25* 1.80* 1.80*   CALCIUM mg/dL 8.3* 8.4 8.2*         Lab Results   Component Value Date    HGBA1C 7.3 (H) 11/23/2024     Lab Results   Component Value Date    TROPONINI <0.02 09/22/2021           VTE Pharmacologic Prophylaxis: VTE covered by:  apixaban, Oral, 5 mg at 11/26/24 1728     VTE Mechanical Prophylaxis: sequential compression device

## 2024-11-26 NOTE — PLAN OF CARE
Problem: PAIN - ADULT  Goal: Verbalizes/displays adequate comfort level or baseline comfort level  Description: Interventions:  - Encourage patient to monitor pain and request assistance  - Assess pain using appropriate pain scale  - Administer analgesics based on type and severity of pain and evaluate response  - Implement non-pharmacological measures as appropriate and evaluate response  - Consider cultural and social influences on pain and pain management  - Notify physician/advanced practitioner if interventions unsuccessful or patient reports new pain  Outcome: Progressing     Problem: INFECTION - ADULT  Goal: Absence or prevention of progression during hospitalization  Description: INTERVENTIONS:  - Assess and monitor for signs and symptoms of infection  - Monitor lab/diagnostic results  - Monitor all insertion sites, i.e. indwelling lines, tubes, and drains  - Monitor endotracheal if appropriate and nasal secretions for changes in amount and color  - [address] appropriate cooling/warming therapies per order  - Administer medications as ordered  - Instruct and encourage patient and family to use good hand hygiene technique  - Identify and instruct in appropriate isolation precautions for identified infection/condition  Outcome: Progressing  Goal: Absence of fever/infection during neutropenic period  Description: INTERVENTIONS:  - Monitor WBC    Outcome: Progressing

## 2024-11-26 NOTE — CASE MANAGEMENT
Case Management Discharge Planning Note    Patient name Mesfin Cano  Location [facility] 410/[facility] 410-01 MRN [id number]  : 1965 Date 2024       Current Admission Date: 2024  Current Admission Diagnosis:Acute on chronic HFpEF, NYHA III, stage C   Patient Active Problem List    Diagnosis Date Noted Date Diagnosed    Iron deficiency 10/07/2024     Onychomycosis 10/05/2024     Electrolyte abnormality 10/05/2024     Anemia 2024     Bilateral leg edema 2024     Abnormal stress test 2024     Uncontrolled atrial flutter (HCC) 2024     Moderate persistent asthma without complication 2024     Status post cholecystectomy 2024     Uncontrolled type 2 diabetes mellitus with hyperglycemia (HCC) 2023     Eosinophilia 10/18/2023     Anemia due to chronic kidney disease 10/16/2023     Nasal congestion 10/06/2023     Loose stools 2023     Acute on chronic HFpEF, NYHA III, stage C 04/10/2023     Diabetic polyneuropathy associated with type 2 diabetes mellitus (HCC) 2022     Stage 3a chronic kidney disease (HCC) 2022     Presence of CardioMEMS HF system 2022     Paroxysmal atrial fibrillation (HCC) 2022     Hypokalemia 2021     HNP (herniated nucleus pulposus), lumbar 2021     Foraminal stenosis of lumbar region 2021     VICKY (obstructive sleep apnea)      Hyperlipidemia 2019     Primary osteoarthritis of both knees 2018     Irritable bowel syndrome with diarrhea 2018     Primary hypertension 2018     Vitamin B12 deficiency 2016     Vitamin D deficiency 2016     Morbid obesity (HCC) 2016     Primary osteoarthritis of right knee 10/15/2013       LOS (days): 3  Geometric Mean LOS (GMLOS) (days):   Days to GMLOS:     OBJECTIVE:  Risk of Unplanned Readmission Score: 40.8         Current admission status: Inpatient   Preferred Pharmacy:   [id number] - [place], [place] - [address] [place] [address]  [address] [address]  Phone: [phone] Fax: [phone]    Primary Care Provider: Soo Chavez MD    Primary Insurance: BLUE CROSS  Secondary Insurance:     DISCHARGE DETAILS:             Additional Comments: Per rounds and discussion with ADEN ISAAC, pt is still on Bumex gtt and not medically stable to return home. CM will remain available for DCP needs.

## 2024-11-26 NOTE — ASSESSMENT & PLAN NOTE
Lab Results   Component Value Date    HGBA1C 7.3 (H) 11/23/2024       Recent Labs     11/25/24  1659 11/25/24  2110 11/26/24  0735 11/26/24  1107   POCGLU 194* 194* 159* 208*       Blood Sugar Average: Last 72 hrs:  (P) 170.4185403130225611  Diabetes mellitus type 2 uncontrolled  Hold sitagliptin while inpatient  Lantus 15 units daily  Titrate insulin dose based on Accu-Cheks  Outpatient endocrinology follow-up for better diabetes control encouraged  Monitor Accu-Cheks  Avoidable glycemia

## 2024-11-26 NOTE — ASSESSMENT & PLAN NOTE
Wt Readings from Last 3 Encounters:   11/26/24 (!) 144 kg (316 lb 9.3 oz)   10/29/24 (!) 145 kg (319 lb)   10/10/24 (!) 145 kg (319 lb)       Acute on chronic diastolic congestive heart failure  Patient has a cardiac sensor and was told by his heart failure RN pressure is increased to 20  Continue on Bumex drip  Continue metoprolol  Empagliflozin 10 mg daily  Monitor and replete electrolytes  Cardiology following  Low-salt diet  Patient clinically improving is making good urine output

## 2024-11-27 LAB
ANION GAP SERPL CALCULATED.3IONS-SCNC: 9 MMOL/L (ref 4–13)
BUN SERPL-MCNC: 33 MG/DL (ref 5–25)
CALCIUM SERPL-MCNC: 8.2 MG/DL (ref 8.4–10.2)
CHLORIDE SERPL-SCNC: 97 MMOL/L (ref 96–108)
CO2 SERPL-SCNC: 30 MMOL/L (ref 21–32)
CREAT SERPL-MCNC: 2.11 MG/DL (ref 0.6–1.3)
GFR SERPL CREATININE-BSD FRML MDRD: 33 ML/MIN/1.73SQ M
GLUCOSE SERPL-MCNC: 153 MG/DL (ref 65–140)
GLUCOSE SERPL-MCNC: 156 MG/DL (ref 65–140)
GLUCOSE SERPL-MCNC: 171 MG/DL (ref 65–140)
GLUCOSE SERPL-MCNC: 202 MG/DL (ref 65–140)
GLUCOSE SERPL-MCNC: 235 MG/DL (ref 65–140)
POTASSIUM SERPL-SCNC: 4 MMOL/L (ref 3.5–5.3)
SODIUM SERPL-SCNC: 136 MMOL/L (ref 135–147)

## 2024-11-27 PROCEDURE — 99232 SBSQ HOSP IP/OBS MODERATE 35: CPT | Performed by: PHYSICIAN ASSISTANT

## 2024-11-27 PROCEDURE — 82948 REAGENT STRIP/BLOOD GLUCOSE: CPT

## 2024-11-27 PROCEDURE — 99232 SBSQ HOSP IP/OBS MODERATE 35: CPT | Performed by: INTERNAL MEDICINE

## 2024-11-27 PROCEDURE — 80048 BASIC METABOLIC PNL TOTAL CA: CPT | Performed by: STUDENT IN AN ORGANIZED HEALTH CARE EDUCATION/TRAINING PROGRAM

## 2024-11-27 RX ORDER — INSULIN GLARGINE 100 [IU]/ML
20 INJECTION, SOLUTION SUBCUTANEOUS
Status: DISCONTINUED | OUTPATIENT
Start: 2024-11-27 | End: 2024-11-29 | Stop reason: HOSPADM

## 2024-11-27 RX ORDER — BUMETANIDE 2 MG/1
6 TABLET ORAL 3 TIMES DAILY
Status: DISCONTINUED | OUTPATIENT
Start: 2024-11-27 | End: 2024-11-29 | Stop reason: HOSPADM

## 2024-11-27 RX ORDER — BUMETANIDE 2 MG/1
6 TABLET ORAL 3 TIMES DAILY
Status: DISCONTINUED | OUTPATIENT
Start: 2024-11-27 | End: 2024-11-27

## 2024-11-27 RX ORDER — SPIRONOLACTONE 25 MG/1
25 TABLET ORAL DAILY
Status: DISCONTINUED | OUTPATIENT
Start: 2024-11-27 | End: 2024-11-29 | Stop reason: HOSPADM

## 2024-11-27 RX ADMIN — EMPAGLIFLOZIN 10 MG: 10 TABLET, FILM COATED ORAL at 09:37

## 2024-11-27 RX ADMIN — FAMOTIDINE 20 MG: 20 TABLET, FILM COATED ORAL at 09:37

## 2024-11-27 RX ADMIN — MONTELUKAST 10 MG: 10 TABLET, FILM COATED ORAL at 21:18

## 2024-11-27 RX ADMIN — METOPROLOL SUCCINATE 50 MG: 50 TABLET, EXTENDED RELEASE ORAL at 09:35

## 2024-11-27 RX ADMIN — POTASSIUM CHLORIDE 60 MEQ: 1500 TABLET, EXTENDED RELEASE ORAL at 09:40

## 2024-11-27 RX ADMIN — BUDESONIDE AND FORMOTEROL FUMARATE DIHYDRATE 2 PUFF: 160; 4.5 AEROSOL RESPIRATORY (INHALATION) at 17:34

## 2024-11-27 RX ADMIN — BUMETANIDE 6 MG: 2 TABLET ORAL at 17:33

## 2024-11-27 RX ADMIN — PREGABALIN 75 MG: 75 CAPSULE ORAL at 21:18

## 2024-11-27 RX ADMIN — CYANOCOBALAMIN TAB 500 MCG 1000 MCG: 500 TAB at 09:34

## 2024-11-27 RX ADMIN — FAMOTIDINE 20 MG: 20 TABLET, FILM COATED ORAL at 17:33

## 2024-11-27 RX ADMIN — SPIRONOLACTONE 25 MG: 25 TABLET ORAL at 10:18

## 2024-11-27 RX ADMIN — BUMETANIDE 6 MG: 2 TABLET ORAL at 10:18

## 2024-11-27 RX ADMIN — UMECLIDINIUM 1 PUFF: 62.5 AEROSOL, POWDER ORAL at 09:34

## 2024-11-27 RX ADMIN — APIXABAN 5 MG: 5 TABLET, FILM COATED ORAL at 09:33

## 2024-11-27 RX ADMIN — LORATADINE 10 MG: 10 TABLET ORAL at 09:34

## 2024-11-27 RX ADMIN — APIXABAN 5 MG: 5 TABLET, FILM COATED ORAL at 17:33

## 2024-11-27 RX ADMIN — INSULIN LISPRO 1 UNITS: 100 INJECTION, SOLUTION INTRAVENOUS; SUBCUTANEOUS at 17:34

## 2024-11-27 RX ADMIN — ATORVASTATIN CALCIUM 10 MG: 10 TABLET, FILM COATED ORAL at 09:34

## 2024-11-27 RX ADMIN — BUMETANIDE 6 MG: 2 TABLET ORAL at 21:17

## 2024-11-27 RX ADMIN — POTASSIUM CHLORIDE 40 MEQ: 1500 TABLET, EXTENDED RELEASE ORAL at 21:17

## 2024-11-27 RX ADMIN — INSULIN LISPRO 1 UNITS: 100 INJECTION, SOLUTION INTRAVENOUS; SUBCUTANEOUS at 08:10

## 2024-11-27 RX ADMIN — INSULIN LISPRO 1 UNITS: 100 INJECTION, SOLUTION INTRAVENOUS; SUBCUTANEOUS at 11:55

## 2024-11-27 RX ADMIN — BUDESONIDE AND FORMOTEROL FUMARATE DIHYDRATE 2 PUFF: 160; 4.5 AEROSOL RESPIRATORY (INHALATION) at 09:34

## 2024-11-27 RX ADMIN — POTASSIUM CHLORIDE 40 MEQ: 1500 TABLET, EXTENDED RELEASE ORAL at 17:38

## 2024-11-27 RX ADMIN — INSULIN GLARGINE 20 UNITS: 100 INJECTION, SOLUTION SUBCUTANEOUS at 21:17

## 2024-11-27 NOTE — PROGRESS NOTES
Progress Note - Hospitalist   Name: Mesfin Cano 59 y.o. male I MRN: [id number]  Unit/Bed#: [facility] 410-01 I Date of Admission: 11/23/2024   Date of Service: 11/27/2024 I Hospital Day: 4    Assessment & Plan  Acute on chronic HFpEF, NYHA III, stage C  Wt Readings from Last 3 Encounters:   11/27/24 (!) 144 kg (317 lb)   10/29/24 (!) 145 kg (319 lb)   10/10/24 (!) 145 kg (319 lb)     Acute on chronic diastolic congestive heart failure.  Patient has a cardiac sensor and was told by his heart failure RN pressure is increased to 20  HF team following.  Was on Bumex gtt, transitioned to PO Bumex 6 mg TID on 11/27.  Monitor response.  Added spironolactone 25 mg daily 11/27.  Continue metoprolol  Empagliflozin 10 mg daily  Monitor and replete electrolytes  Low-salt diet  Morbid obesity (HCC)  Therapeutic lifestyle modification encouraged  VICKY (obstructive sleep apnea)  Patient reports his CPAP machine is on recall.  Outpatient sleep study encouraged  CPAP compliance encouraged  Paroxysmal atrial fibrillation (HCC)  Continue metoprolol XL 50 mg p.o. daily  Eliquis anticoagulation  Uncontrolled type 2 diabetes mellitus with hyperglycemia (HCC)  Lab Results   Component Value Date    HGBA1C 7.3 (H) 11/23/2024       Recent Labs     11/26/24  1107 11/26/24  1701 11/26/24  2054 11/27/24  0759   POCGLU 208* 151* 203* 156*       Blood Sugar Average: Last 72 hrs:  (P) 172  Diabetes mellitus type 2 uncontrolled  Hold sitagliptin while inpatient  Lantus  -- with hyperglycemia increase to 20 units daily  Titrate insulin dose based on Accu-Cheks  Outpatient endocrinology follow-up for better diabetes control encouraged  Monitor Accu-Cheks  Avoidable glycemia  Primary hypertension  Blood pressure is at goal  Continue metoprolol 50 mg daily  Hyperlipidemia  Continue atorvastatin  Presence of CardioMEMS HF system  Mgmt per HF     VTE Pharmacologic Prophylaxis: VTE Score: 4 Moderate Risk (Score 3-4) - Pharmacological DVT Prophylaxis Ordered:  apixaban (Eliquis).    Mobility:   Basic Mobility Inpatient Raw Score: 24  JH-HLM Goal: 8: Walk 250 feet or more  JH-HLM Achieved: 1: Laying in bed  JH-HLM Goal NOT achieved. Continue with multidisciplinary rounding and encourage appropriate mobility to improve upon JH-HLM goals.    Patient Centered Rounds: I performed bedside rounds with nursing staff today.   Discussions with Specialists or Other Care Team Provider:     Education and Discussions with Family / Patient: Patient declined call to .     Current Length of Stay: 4 day(s)  Current Patient Status: Inpatient   Certification Statement: The patient will continue to require additional inpatient hospital stay due to CHF exacerbation   Discharge Plan: Anticipate discharge tomorrow to home.    Code Status: Level 1 - Full Code    Subjective   No acute complaints, he feels well today    Objective :  Temp:  [97.4 °F (36.3 °C)-97.7 °F (36.5 °C)] 97.5 °F (36.4 °C)  HR:  [57-80] 64  BP: (121-148)/(65-78) 127/75  Resp:  [18-19] 18  SpO2:  [97 %-100 %] 99 %  O2 Device: None (Room air)    Body mass index is 41.82 kg/m².     Input and Output Summary (last 24 hours):     Intake/Output Summary (Last 24 hours) at 11/27/2024 1112  Last data filed at 11/27/2024 1048  Gross per 24 hour   Intake 1107.26 ml   Output 2700 ml   Net -1592.74 ml       Physical Exam  Vitals reviewed.   Constitutional:       General: He is not in acute distress.     Appearance: He is not toxic-appearing.   HENT:      Head: Normocephalic and atraumatic.   Eyes:      Extraocular Movements: Extraocular movements intact.   Cardiovascular:      Rate and Rhythm: Normal rate and regular rhythm.   Pulmonary:      Effort: Pulmonary effort is normal. No respiratory distress.   Abdominal:      General: Bowel sounds are normal. There is no distension.      Palpations: Abdomen is soft.   Musculoskeletal:         General: Normal range of motion.   Neurological:      General: No focal deficit present.       Mental Status: He is alert and oriented to person, place, and time.   Psychiatric:         Mood and Affect: Mood normal.         Behavior: Behavior normal.         Thought Content: Thought content normal.         Lines/Drains:              Lab Results: I have reviewed the following results:   Results from last 7 days   Lab Units 11/26/24  0457   WBC Thousand/uL 7.51   HEMOGLOBIN g/dL 10.9*   HEMATOCRIT % 34.6*   PLATELETS Thousands/uL 231   SEGS PCT % 63   LYMPHO PCT % 18   MONO PCT % 10   EOS PCT % 7*     Results from last 7 days   Lab Units 11/27/24  0939 11/24/24  0653 11/23/24  1508   SODIUM mmol/L 136   < > 137   POTASSIUM mmol/L 4.0   < > 3.9   CHLORIDE mmol/L 97   < > 101   CO2 mmol/L 30   < > 21   BUN mg/dL 33*   < > 21   CREATININE mg/dL 2.11*   < > 1.27   ANION GAP mmol/L 9   < > 15*   CALCIUM mg/dL 8.2*   < > 7.8*   ALBUMIN g/dL  --   --  3.8   TOTAL BILIRUBIN mg/dL  --   --  0.46   ALK PHOS U/L  --   --  108*   ALT U/L  --   --  5*   AST U/L  --   --  10*   GLUCOSE RANDOM mg/dL 202*   < > 151*    < > = values in this interval not displayed.         Results from last 7 days   Lab Units 11/27/24  0759 11/26/24  2054 11/26/24  1701 11/26/24  1107 11/26/24  0735 11/25/24  2110 11/25/24  1659 11/25/24  1134 11/25/24  0812 11/24/24  2130 11/24/24  1728 11/24/24  1137   POC GLUCOSE mg/dl 156* 203* 151* 208* 159* 194* 194* 170* 131 194* 154* 184*     Results from last 7 days   Lab Units 11/23/24  1508   HEMOGLOBIN A1C % 7.3*           Recent Cultures (last 7 days):         Imaging Results Review: No pertinent imaging studies reviewed.  Other Study Results Review: No additional pertinent studies reviewed.    Last 24 Hours Medication List:     Current Facility-Administered Medications:     acetaminophen (TYLENOL) tablet 650 mg, Q6H PRN    albuterol (PROVENTIL HFA,VENTOLIN HFA) inhaler 1 puff, Q4H PRN    aluminum-magnesium hydroxide-simethicone (MAALOX) oral suspension 15 mL, Q4H PRN    apixaban (ELIQUIS) tablet  5 mg, BID    atorvastatin (LIPITOR) tablet 10 mg, Daily    budesonide-formoterol (SYMBICORT) 160-4.5 mcg/act inhaler 2 puff, BID    bumetanide (BUMEX) tablet 6 mg, TID    cyanocobalamin (VITAMIN B-12) tablet 1,000 mcg, Daily    Empagliflozin (JARDIANCE) tablet 10 mg, Daily    famotidine (PEPCID) tablet 20 mg, BID    fluticasone (FLONASE) 50 mcg/act nasal spray 1 spray, BID    insulin glargine (LANTUS) subcutaneous injection 15 Units 0.15 mL, HS    insulin lispro (HumALOG/ADMELOG) 100 units/mL subcutaneous injection 1-5 Units, TID AC **AND** Fingerstick Glucose (POCT), TID AC    loratadine (CLARITIN) tablet 10 mg, Daily    metoprolol succinate (TOPROL-XL) 24 hr tablet 50 mg, Daily    montelukast (SINGULAIR) tablet 10 mg, HS    nitroglycerin (NITROSTAT) SL tablet 0.4 mg, Q5 Min PRN    polyethylene glycol (MIRALAX) packet 17 g, Daily    potassium chloride (Klor-Con M20) CR tablet 40 mEq, TID    pregabalin (LYRICA) capsule 75 mg, HS    sodium chloride (OCEAN) 0.65 % nasal spray 1 spray, Q1H PRN    spironolactone (ALDACTONE) tablet 25 mg, Daily    umeclidinium 62.5 mcg/actuation inhaler AEPB 1 puff, Daily    Administrative Statements   Today, Patient Was Seen By: Enedina Gray PA-C      **Please Note: This note may have been constructed using a voice recognition system.**

## 2024-11-27 NOTE — ASSESSMENT & PLAN NOTE
Wt Readings from Last 3 Encounters:   11/27/24 (!) 144 kg (317 lb)   10/29/24 (!) 145 kg (319 lb)   10/10/24 (!) 145 kg (319 lb)     Acute on chronic diastolic congestive heart failure.  Patient has a cardiac sensor and was told by his heart failure RN pressure is increased to 20  HF team following.  Was on Bumex gtt, transitioned to PO Bumex 6 mg TID on 11/27.  Monitor response.  Added spironolactone 25 mg daily 11/27.  Continue metoprolol  Empagliflozin 10 mg daily  Monitor and replete electrolytes  Low-salt diet

## 2024-11-27 NOTE — PLAN OF CARE
Problem: PAIN - ADULT  Goal: Verbalizes/displays adequate comfort level or baseline comfort level  Description: Interventions:  - Encourage patient to monitor pain and request assistance  - Assess pain using appropriate pain scale  - Administer analgesics based on type and severity of pain and evaluate response  - Implement non-pharmacological measures as appropriate and evaluate response  - Consider cultural and social influences on pain and pain management  - Notify physician/advanced practitioner if interventions unsuccessful or patient reports new pain  Outcome: Progressing     Problem: INFECTION - ADULT  Goal: Absence or prevention of progression during hospitalization  Description: INTERVENTIONS:  - Assess and monitor for signs and symptoms of infection  - Monitor lab/diagnostic results  - Monitor all insertion sites, i.e. indwelling lines, tubes, and drains  - Monitor endotracheal if appropriate and nasal secretions for changes in amount and color  - Beverly appropriate cooling/warming therapies per order  - Administer medications as ordered  - Instruct and encourage patient and family to use good hand hygiene technique  - Identify and instruct in appropriate isolation precautions for identified infection/condition  Outcome: Progressing     Problem: INFECTION - ADULT  Goal: Absence of fever/infection during neutropenic period  Description: INTERVENTIONS:  - Monitor WBC    Outcome: Progressing     Problem: SAFETY ADULT  Goal: Maintains/Returns to pre admission functional level  Description: INTERVENTIONS:  - Perform AM-PAC 6 Click Basic Mobility/ Daily Activity assessment daily.  - Set and communicate daily mobility goal to care team and patient/family/caregiver.   - Collaborate with rehabilitation services on mobility goals if consulted  - Perform Range of Motion 2 times a day.  - Reposition patient every 2 hours.  - Dangle patient 2 times a day  - Stand patient 2 times a day  - Ambulate patient 2 times a  day  - Out of bed to chair 2 times a day   - Out of bed for meals 2  Problem: DISCHARGE PLANNING  Goal: Discharge to home or other facility with appropriate resources  Description: INTERVENTIONS:  - Identify barriers to discharge w/patient and caregiver  - Arrange for needed discharge resources and transportation as appropriate  - Identify discharge learning needs (meds, wound care, etc.)  - Arrange for interpretive services to assist at discharge as needed  - Refer to Case Management Department for coordinating discharge planning if the patient needs post-hospital services based on physician/advanced practitioner order or complex needs related to functional status, cognitive ability, or social support system  Outcome: Progressing     Problem: Knowledge Deficit  Goal: Patient/family/caregiver demonstrates understanding of disease process, treatment plan, medications, and discharge instructions  Description: Complete learning assessment and assess knowledge base.  Interventions:  - Provide teaching at level of understanding  - Provide teaching via preferred learning methods  Outcome: Progressing     Problem: CARDIOVASCULAR - ADULT  Goal: Maintains optimal cardiac output and hemodynamic stability  Description: INTERVENTIONS:  - Monitor I/O, vital signs and rhythm  - Monitor for S/S and trends of decreased cardiac output  - Administer and titrate ordered vasoactive medications to optimize hemodynamic stability  - Assess quality of pulses, skin color and temperature  - Assess for signs of decreased coronary artery perfusion  - Instruct patient to report change in severity of symptoms  Outcome: Progressing     Problem: CARDIOVASCULAR - ADULT  Goal: Absence of cardiac dysrhythmias or at baseline rhythm  Description: INTERVENTIONS:  - Continuous cardiac monitoring, vital signs, obtain 12 lead EKG if ordered  - Administer antiarrhythmic and heart rate control medications as ordered  - Monitor electrolytes and administer  replacement therapy as ordered  Outcome: Progressing     Problem: RESPIRATORY - ADULT  Goal: Achieves optimal ventilation and oxygenation  Description: INTERVENTIONS:  - Assess for changes in respiratory status  - Assess for changes in mentation and behavior  - Position to facilitate oxygenation and minimize respiratory effort  - Oxygen administered by appropriate delivery if ordered  - Initiate smoking cessation education as indicated  - Encourage broncho-pulmonary hygiene including cough, deep breathe, Incentive Spirometry  - Assess the need for suctioning and aspirate as needed  - Assess and instruct to report SOB or any respiratory difficulty  - Respiratory Therapy support as indicated  Outcome: Progressing     Problem: GENITOURINARY - ADULT  Goal: Maintains or returns to baseline urinary function  Description: INTERVENTIONS:  - Assess urinary function  - Encourage oral fluids to ensure adequate hydration if ordered  - Administer IV fluids as ordered to ensure adequate hydration  - Administer ordered medications as needed  - Offer frequent toileting  - Follow urinary retention protocol if ordered  Outcome: Progressing     Problem: GENITOURINARY - ADULT  Goal: Absence of urinary retention  Description: INTERVENTIONS:  - Assess patient’s ability to void and empty bladder  - Monitor I/O  - Bladder scan as needed  - Discuss with physician/AP medications to alleviate retention as needed  - Discuss catheterization for long term situations as appropriate  Outcome: Progressing     Problem: METABOLIC, FLUID AND ELECTROLYTES - ADULT  Goal: Electrolytes maintained within normal limits  Description: INTERVENTIONS:  - Monitor labs and assess patient for signs and symptoms of electrolyte imbalances  - Administer electrolyte replacement as ordered  - Monitor response to electrolyte replacements, including repeat lab results as appropriate  - Instruct patient on fluid and nutrition as appropriate  Outcome: Progressing      Problem: METABOLIC, FLUID AND ELECTROLYTES - ADULT  Goal: Fluid balance maintained  Description: INTERVENTIONS:  - Monitor labs   - Monitor I/O and WT  - Instruct patient on fluid and nutrition as appropriate  - Assess for signs & symptoms of volume excess or deficit  Outcome: Progressing     Problem: METABOLIC, FLUID AND ELECTROLYTES - ADULT  Goal: Glucose maintained within target range  Description: INTERVENTIONS:  - Monitor Blood Glucose as ordered  - Assess for signs and symptoms of hyperglycemia and hypoglycemia  - Administer ordered medications to maintain glucose within target range  - Assess nutritional intake and initiate nutrition service referral as needed  Outcome: Progressing    times a day  - Out of bed for toileting  - Record patient progress and toleration of activity level   Outcome: Progressing

## 2024-11-27 NOTE — ASSESSMENT & PLAN NOTE
Lab Results   Component Value Date    HGBA1C 7.3 (H) 11/23/2024       Recent Labs     11/26/24  1107 11/26/24  1701 11/26/24 2054 11/27/24  0759   POCGLU 208* 151* 203* 156*       Blood Sugar Average: Last 72 hrs:  (P) 172  Diabetes mellitus type 2 uncontrolled  Hold sitagliptin while inpatient  Lantus  -- with hyperglycemia increase to 20 units daily  Titrate insulin dose based on Accu-Cheks  Outpatient endocrinology follow-up for better diabetes control encouraged  Monitor Accu-Cheks  Avoidable glycemia

## 2024-11-28 LAB
ANION GAP SERPL CALCULATED.3IONS-SCNC: 11 MMOL/L (ref 4–13)
BUN SERPL-MCNC: 34 MG/DL (ref 5–25)
CALCIUM SERPL-MCNC: 8.3 MG/DL (ref 8.4–10.2)
CHLORIDE SERPL-SCNC: 98 MMOL/L (ref 96–108)
CO2 SERPL-SCNC: 30 MMOL/L (ref 21–32)
CREAT SERPL-MCNC: 2.12 MG/DL (ref 0.6–1.3)
GFR SERPL CREATININE-BSD FRML MDRD: 33 ML/MIN/1.73SQ M
GLUCOSE SERPL-MCNC: 122 MG/DL (ref 65–140)
GLUCOSE SERPL-MCNC: 148 MG/DL (ref 65–140)
GLUCOSE SERPL-MCNC: 165 MG/DL (ref 65–140)
GLUCOSE SERPL-MCNC: 174 MG/DL (ref 65–140)
GLUCOSE SERPL-MCNC: 206 MG/DL (ref 65–140)
POTASSIUM SERPL-SCNC: 4 MMOL/L (ref 3.5–5.3)
SODIUM SERPL-SCNC: 139 MMOL/L (ref 135–147)

## 2024-11-28 PROCEDURE — 99232 SBSQ HOSP IP/OBS MODERATE 35: CPT | Performed by: NURSE PRACTITIONER

## 2024-11-28 PROCEDURE — 80048 BASIC METABOLIC PNL TOTAL CA: CPT | Performed by: PHYSICIAN ASSISTANT

## 2024-11-28 PROCEDURE — 82948 REAGENT STRIP/BLOOD GLUCOSE: CPT

## 2024-11-28 PROCEDURE — 99232 SBSQ HOSP IP/OBS MODERATE 35: CPT | Performed by: PHYSICIAN ASSISTANT

## 2024-11-28 RX ADMIN — BUMETANIDE 6 MG: 2 TABLET ORAL at 09:34

## 2024-11-28 RX ADMIN — ATORVASTATIN CALCIUM 10 MG: 10 TABLET, FILM COATED ORAL at 09:34

## 2024-11-28 RX ADMIN — INSULIN GLARGINE 20 UNITS: 100 INJECTION, SOLUTION SUBCUTANEOUS at 22:25

## 2024-11-28 RX ADMIN — METOPROLOL SUCCINATE 50 MG: 50 TABLET, EXTENDED RELEASE ORAL at 09:34

## 2024-11-28 RX ADMIN — SPIRONOLACTONE 25 MG: 25 TABLET ORAL at 09:34

## 2024-11-28 RX ADMIN — BUDESONIDE AND FORMOTEROL FUMARATE DIHYDRATE 2 PUFF: 160; 4.5 AEROSOL RESPIRATORY (INHALATION) at 17:41

## 2024-11-28 RX ADMIN — CYANOCOBALAMIN TAB 500 MCG 1000 MCG: 500 TAB at 09:34

## 2024-11-28 RX ADMIN — BUDESONIDE AND FORMOTEROL FUMARATE DIHYDRATE 2 PUFF: 160; 4.5 AEROSOL RESPIRATORY (INHALATION) at 09:34

## 2024-11-28 RX ADMIN — POTASSIUM CHLORIDE 40 MEQ: 1500 TABLET, EXTENDED RELEASE ORAL at 21:32

## 2024-11-28 RX ADMIN — POTASSIUM CHLORIDE 40 MEQ: 1500 TABLET, EXTENDED RELEASE ORAL at 17:43

## 2024-11-28 RX ADMIN — FAMOTIDINE 20 MG: 20 TABLET, FILM COATED ORAL at 09:34

## 2024-11-28 RX ADMIN — PREGABALIN 75 MG: 75 CAPSULE ORAL at 22:31

## 2024-11-28 RX ADMIN — INSULIN LISPRO 1 UNITS: 100 INJECTION, SOLUTION INTRAVENOUS; SUBCUTANEOUS at 17:40

## 2024-11-28 RX ADMIN — INSULIN LISPRO 1 UNITS: 100 INJECTION, SOLUTION INTRAVENOUS; SUBCUTANEOUS at 12:04

## 2024-11-28 RX ADMIN — POTASSIUM CHLORIDE 40 MEQ: 1500 TABLET, EXTENDED RELEASE ORAL at 09:34

## 2024-11-28 RX ADMIN — LORATADINE 10 MG: 10 TABLET ORAL at 09:34

## 2024-11-28 RX ADMIN — APIXABAN 5 MG: 5 TABLET, FILM COATED ORAL at 17:40

## 2024-11-28 RX ADMIN — FAMOTIDINE 20 MG: 20 TABLET, FILM COATED ORAL at 17:39

## 2024-11-28 RX ADMIN — BUMETANIDE 6 MG: 2 TABLET ORAL at 17:39

## 2024-11-28 RX ADMIN — ACETAMINOPHEN 650 MG: 325 TABLET, FILM COATED ORAL at 13:58

## 2024-11-28 RX ADMIN — UMECLIDINIUM 1 PUFF: 62.5 AEROSOL, POWDER ORAL at 09:35

## 2024-11-28 RX ADMIN — MONTELUKAST 10 MG: 10 TABLET, FILM COATED ORAL at 22:31

## 2024-11-28 RX ADMIN — BUMETANIDE 6 MG: 2 TABLET ORAL at 21:31

## 2024-11-28 RX ADMIN — APIXABAN 5 MG: 5 TABLET, FILM COATED ORAL at 09:34

## 2024-11-28 RX ADMIN — EMPAGLIFLOZIN 10 MG: 10 TABLET, FILM COATED ORAL at 09:35

## 2024-11-28 NOTE — ASSESSMENT & PLAN NOTE
Lab Results   Component Value Date    HGBA1C 7.3 (H) 11/23/2024       Recent Labs     11/27/24  1143 11/27/24  1654 11/27/24 2020 11/28/24  0810   POCGLU 171* 153* 235* 122       Blood Sugar Average: Last 72 hrs:  (P) [id number]  Diabetes mellitus type 2 uncontrolled  Hold sitagliptin while inpatient  Lantus  -- with hyperglycemia increase to 20 units daily  Titrate insulin dose based on Accu-Cheks  Outpatient endocrinology follow-up for better diabetes control encouraged  Monitor Accu-Cheks  Avoidable glycemia

## 2024-11-28 NOTE — PROGRESS NOTES
Progress Note - Hospitalist   Name: Mesfin Cano 59 y.o. male I MRN: [id number]  Unit/Bed#: [facility] 410-01 I Date of Admission: 11/23/2024   Date of Service: 11/28/2024 I Hospital Day: 5    Assessment & Plan  Acute on chronic HFpEF, NYHA III, stage C  Wt Readings from Last 3 Encounters:   11/28/24 (!) 143 kg (314 lb 9.6 oz)   10/29/24 (!) 145 kg (319 lb)   10/10/24 (!) 145 kg (319 lb)     Acute on chronic diastolic congestive heart failure.  Patient has a cardiac sensor and was told by his heart failure RN pressure is increased to 20  HF team following.  Was on Bumex gtt, transitioned to PO Bumex 6 mg TID on 11/27.  Monitor response.  Added spironolactone 25 mg daily 11/27.  Plan to increase outpt Metolazone to 5 mg on MWF schedule (with additional 20 meq potassium)   Will see in am to determine if requires an additional dose of Metalazone   Monitor elecrolytes   Continue metoprolol  Empagliflozin 10 mg daily  Monitor and replete electrolytes  Low-salt diet  Morbid obesity (HCC)  Therapeutic lifestyle modification encouraged  VICKY (obstructive sleep apnea)  Patient reports his CPAP machine is on recall.  Outpatient sleep study encouraged  CPAP compliance encouraged  Paroxysmal atrial fibrillation (HCC)  Continue metoprolol XL 50 mg p.o. daily  Eliquis anticoagulation  Uncontrolled type 2 diabetes mellitus with hyperglycemia (HCC)  Lab Results   Component Value Date    HGBA1C 7.3 (H) 11/23/2024       Recent Labs     11/27/24  1143 11/27/24  1654 11/27/24 2020 11/28/24  0810   POCGLU 171* 153* 235* 122       Blood Sugar Average: Last 72 hrs:  (P) [id number]  Diabetes mellitus type 2 uncontrolled  Hold sitagliptin while inpatient  Lantus  -- with hyperglycemia increase to 20 units daily  Titrate insulin dose based on Accu-Cheks  Outpatient endocrinology follow-up for better diabetes control encouraged  Monitor Accu-Cheks  Avoidable glycemia  Primary hypertension  Blood pressure is at goal  Continue metoprolol 50  mg daily  Hyperlipidemia  Continue atorvastatin  Presence of CardioMEMS HF system  Mgmt per HF     VTE Pharmacologic Prophylaxis: VTE Score: 4 High Risk (Score >/= 5) - Pharmacological DVT Prophylaxis Ordered: apixaban (Eliquis). Sequential Compression Devices Ordered.    Mobility:   Basic Mobility Inpatient Raw Score: 24  JH-HLM Goal: 8: Walk 250 feet or more  JH-HLM Achieved: 6: Walk 10 steps or more  JH-HLM Goal NOT achieved. Continue with multidisciplinary rounding and encourage appropriate mobility to improve upon JH-HLM goals.    Patient Centered Rounds: I performed bedside rounds with nursing staff today.   Discussions with Specialists or Other Care Team Provider: nursing and HF ap provider     Education and Discussions with Family / Patient: Patient declined call to .     Current Length of Stay: 5 day(s)  Current Patient Status: Inpatient   Certification Statement: The patient will continue to require additional inpatient hospital stay due to monitor additional day for final dc plan on diuretics   Discharge Plan: Anticipate discharge tomorrow to home.    Code Status: Level 1 - Full Code    Subjective   Patient appears well does report he is concerned about his creatinine being mildly elevated and some back pain around his kidney.  Otherwise he is doing well breathing is well lung sounds clear slight swelling in lower extremities bilaterally but significantly improved from admission.    Objective :  Temp:  [97.5 °F (36.4 °C)-98.2 °F (36.8 °C)] 97.6 °F (36.4 °C)  HR:  [64-80] 64  BP: (124-135)/(54-75) 124/66  Resp:  [17-20] 18  SpO2:  [98 %-99 %] 98 %  O2 Device: None (Room air)    Body mass index is 41.51 kg/m².     Input and Output Summary (last 24 hours):     Intake/Output Summary (Last 24 hours) at 11/28/2024 1003  Last data filed at 11/28/2024 0941  Gross per 24 hour   Intake 660 ml   Output 3825 ml   Net -3165 ml       Physical Exam  Constitutional:       General: He is not in acute  distress.     Appearance: He is obese. He is not ill-appearing, toxic-appearing or diaphoretic.   HENT:      Head: Normocephalic and atraumatic.   Eyes:      General:         Right eye: No discharge.         Left eye: No discharge.   Cardiovascular:      Rate and Rhythm: Normal rate.      Heart sounds: No murmur heard.     No friction rub. No gallop.   Pulmonary:      Effort: No respiratory distress.      Breath sounds: No stridor. No wheezing, rhonchi or rales.      Comments: Room air   Chest:      Chest wall: No tenderness.   Abdominal:      General: There is no distension.      Palpations: There is no mass.      Tenderness: There is no abdominal tenderness. There is no guarding or rebound.      Hernia: No hernia is present.   Musculoskeletal:      Right lower leg: Edema present.      Left lower leg: Edema present.      Comments: Non pitting left greater than right    Skin:     Coloration: Skin is not jaundiced or pale.      Findings: No bruising, erythema, lesion or rash.   Neurological:      Mental Status: He is alert and oriented to person, place, and time.   Psychiatric:         Behavior: Behavior normal.           Lines/Drains:              Lab Results: I have reviewed the following results:   Results from last 7 days   Lab Units 11/26/24  0457   WBC Thousand/uL 7.51   HEMOGLOBIN g/dL 10.9*   HEMATOCRIT % 34.6*   PLATELETS Thousands/uL 231   SEGS PCT % 63   LYMPHO PCT % 18   MONO PCT % 10   EOS PCT % 7*     Results from last 7 days   Lab Units 11/28/24  0519 11/24/24  0653 11/23/24  1508   SODIUM mmol/L 139   < > 137   POTASSIUM mmol/L 4.0   < > 3.9   CHLORIDE mmol/L 98   < > 101   CO2 mmol/L 30   < > 21   BUN mg/dL 34*   < > 21   CREATININE mg/dL 2.12*   < > 1.27   ANION GAP mmol/L 11   < > 15*   CALCIUM mg/dL 8.3*   < > 7.8*   ALBUMIN g/dL  --   --  3.8   TOTAL BILIRUBIN mg/dL  --   --  0.46   ALK PHOS U/L  --   --  108*   ALT U/L  --   --  5*   AST U/L  --   --  10*   GLUCOSE RANDOM mg/dL 148*   < > 151*     < > = values in this interval not displayed.         Results from last 7 days   Lab Units 11/28/24  0810 11/27/24  2020 11/27/24  1654 11/27/24  1143 11/27/24  0759 11/26/24  2054 11/26/24  1701 11/26/24  1107 11/26/24  0735 11/25/24  2110 11/25/24  1659 11/25/24  1134   POC GLUCOSE mg/dl 122 235* 153* 171* 156* 203* 151* 208* 159* 194* 194* 170*     Results from last 7 days   Lab Units 11/23/24  1508   HEMOGLOBIN A1C % 7.3*           Recent Cultures (last 7 days):         Imaging Results Review: I reviewed radiology reports from this admission including: xray(s).  Other Study Results Review: No additional pertinent studies reviewed.    Last 24 Hours Medication List:     Current Facility-Administered Medications:     acetaminophen (TYLENOL) tablet 650 mg, Q6H PRN    albuterol (PROVENTIL HFA,VENTOLIN HFA) inhaler 1 puff, Q4H PRN    aluminum-magnesium hydroxide-simethicone (MAALOX) oral suspension 15 mL, Q4H PRN    apixaban (ELIQUIS) tablet 5 mg, BID    atorvastatin (LIPITOR) tablet 10 mg, Daily    budesonide-formoterol (SYMBICORT) 160-4.5 mcg/act inhaler 2 puff, BID    bumetanide (BUMEX) tablet 6 mg, TID    cyanocobalamin (VITAMIN B-12) tablet 1,000 mcg, Daily    Empagliflozin (JARDIANCE) tablet 10 mg, Daily    famotidine (PEPCID) tablet 20 mg, BID    fluticasone (FLONASE) 50 mcg/act nasal spray 1 spray, BID    insulin glargine (LANTUS) subcutaneous injection 20 Units 0.2 mL, HS    insulin lispro (HumALOG/ADMELOG) 100 units/mL subcutaneous injection 1-5 Units, TID AC **AND** Fingerstick Glucose (POCT), TID AC    loratadine (CLARITIN) tablet 10 mg, Daily    metoprolol succinate (TOPROL-XL) 24 hr tablet 50 mg, Daily    montelukast (SINGULAIR) tablet 10 mg, HS    nitroglycerin (NITROSTAT) SL tablet 0.4 mg, Q5 Min PRN    polyethylene glycol (MIRALAX) packet 17 g, Daily    potassium chloride (Klor-Con M20) CR tablet 40 mEq, TID    pregabalin (LYRICA) capsule 75 mg, HS    sodium chloride (OCEAN) 0.65 % nasal spray 1  spray, Q1H PRN    spironolactone (ALDACTONE) tablet 25 mg, Daily    umeclidinium 62.5 mcg/actuation inhaler AEPB 1 puff, Daily    Administrative Statements   Today, Patient Was Seen By: RODRIGUE Wagner      **Please Note: This note may have been constructed using a voice recognition system.**

## 2024-11-28 NOTE — ASSESSMENT & PLAN NOTE
Wt Readings from Last 3 Encounters:   11/28/24 (!) 143 kg (314 lb 9.6 oz)   10/29/24 (!) 145 kg (319 lb)   10/10/24 (!) 145 kg (319 lb)     Acute on chronic diastolic congestive heart failure.  Patient has a cardiac sensor and was told by his heart failure RN pressure is increased to 20  HF team following.  Was on Bumex gtt, transitioned to PO Bumex 6 mg TID on 11/27.  Monitor response.  Added spironolactone 25 mg daily 11/27.  Plan to increase outpt Metolazone to 5 mg on MWF schedule (with additional 20 meq potassium)   Will see in am to determine if requires an additional dose of Metalazone   Monitor elecrolytes   Continue metoprolol  Empagliflozin 10 mg daily  Monitor and replete electrolytes  Low-salt diet

## 2024-11-28 NOTE — PROGRESS NOTES
Advanced Heart Failure / Pulmonary Hypertension Service Progress Note    Mesfin Cano 59 y.o. male   MRN: [id number]  Unit/Bed#: [facility] 410-01; Encounter: [id number]    Assessment:  Principal Problem:    Acute on chronic HFpEF, NYHA III, stage C  Active Problems:    Primary hypertension    Hyperlipidemia    Morbid obesity (HCC)    VICKY (obstructive sleep apnea)    Paroxysmal atrial fibrillation (HCC)    Uncontrolled type 2 diabetes mellitus with hyperglycemia (HCC)    Presence of CardioMEMS HF system    Subjective:   Patient seen and examined. No significant events overnight. Feeling well today. Complaining of mild intermittent back pains this AM. Denies lightheadedness, dizziness, palpitations, chest pain, SOB at rest, worsening LE swelling, PND, and orthopnea.    Objective:   Intake/ Output: 670 mL / 3025 mL.  Weight: 314 lbs (317 lbs on 11/27).  Telemetry:   MAPs: 80-95.    Today's Plan:  Continue current PO Bumex and spironolactone doses.   Will plan to increase outpatient metolazone dosing to 5 mg on MWF schedule (with additional 20 mEq potassium). Will reassess patient while inpatient to determine need for metolazone dose on 11/29.   Potassium 4.0 this AM; goal >4. Continue current PO supplementation.  Plan to check post-hospital BMP early next week.   Hospital follow-up scheduled for 12/04/2024.     Plan:  Acute on chronic HFpEF; LVEF 55%; LVIDd 6.0 cm              Etiology: Afib, HTN, obesity phenotype.              RHC / MEMS calibration 10/11/2023: CardioMEMS data correlates to RHC.               TTE 06/20/2024: LVEF 55%. Normal RV. BRIAN (L>R). Normal IVC.      Guideline Directed Medical Therapy:  --Aldosterone Antagonist: spironolactone 25 mg daily.   --SGLT2 Inhibitor: empagliflozin 10 mg daily.      Volume Management:  --Home Diuretic: Bumex 6 mg TID with metolazone 5 mg MWF.   --Inpatient Diuretic: IV Bumex 1 mg/hr.   --K supplementation: potassium 40 mEq TID.                  Advanced  "Therapies:              --CardioMEMS: implanted 03/09/2022. Goal PAd of 12 mmHg.     Atrial fibrillation, parosxysmal              [id number] = 3 (HF, HTN, DM).              Anticoagulation on Eliquis.               Rate control: metoprolol succinate 50 mg daily.              Rhythm control: No.     Chronic kidney disease, stage IIIb              Baseline creatinine of 1.5-2.0.              Today, creatinine of 2.12 (2.11 on 11/27).      Hypertension  Hyperlipidemia  Asthma, severe persistent: follows with Dr. Noonan as outpatient.   Obstructive sleep apnea  Diabetes mellitus, type II  History of gastric bypass (Samuel-en-Y) surgery   History of tobacco abuse    Vitals:   Blood pressure 124/66, pulse 64, temperature 97.6 °F (36.4 °C), temperature source Oral, resp. rate 18, height 6' 1\" (1.854 m), weight (!) 143 kg (314 lb 9.6 oz), SpO2 98%.    I/O last 3 completed shifts:  In: 697.7 [P.O.:660; I.V.:37.7]  Out: 5025 [Urine:5025]    Weight (last 2 days)       Date/Time Weight    11/28/24 0500 143 (314.6)    11/27/24 0500 144 (317)    11/26/24 0830 144 (316.58)          Wt Readings from Last 10 Encounters:   11/28/24 (!) 143 kg (314 lb 9.6 oz)   10/29/24 (!) 145 kg (319 lb)   10/10/24 (!) 145 kg (319 lb)   10/03/24 (!) 150 kg (331 lb 11.2 oz)   10/01/24 (!) 147 kg (324 lb)   08/20/24 (!) 144 kg (317 lb)   08/08/24 (!) 145 kg (319 lb)   08/08/24 (!) 149 kg (328 lb 3.2 oz)   08/04/24 (!) 144 kg (318 lb 6.4 oz)   07/19/24 (!) 150 kg (330 lb)     Vitals:    11/27/24 1957 11/28/24 0201 11/28/24 0500 11/28/24 0700   BP: 135/67 126/54  124/66   BP Location: Right arm Right arm  Right arm   Pulse: 80 69  64   Resp: 20 18  18   Temp:  98.1 °F (36.7 °C)  97.6 °F (36.4 °C)   TempSrc:  Oral  Oral   SpO2: 98%   98%   Weight:   (!) 143 kg (314 lb 9.6 oz)    Height:           Physical Exam  Vitals reviewed.   Constitutional:       General: He is awake. He is not in acute distress.     Appearance: Normal appearance. He is well-developed " and overweight. He is not toxic-appearing or diaphoretic.   HENT:      Head: Normocephalic.      Nose: Nose normal.      Mouth/Throat:      Mouth: Mucous membranes are moist.   Eyes:      General: No scleral icterus.     Conjunctiva/sclera: Conjunctivae normal.   Neck:      Trachea: No tracheal deviation.   Cardiovascular:      Rate and Rhythm: Normal rate and regular rhythm.   Pulmonary:      Effort: Pulmonary effort is normal. No tachypnea, bradypnea, accessory muscle usage or respiratory distress.      Breath sounds: Normal air entry. No decreased air movement. No rales.   Abdominal:      Palpations: Abdomen is soft.   Musculoskeletal:      Cervical back: Neck supple.      Right lower leg: No edema.      Left lower leg: No edema.   Skin:     General: Skin is warm and dry.      Coloration: Skin is pale. Skin is not jaundiced.   Neurological:      General: No focal deficit present.      Mental Status: He is alert and oriented to person, place, and time.   Psychiatric:         Attention and Perception: Attention normal.         Mood and Affect: Mood and affect normal.         Speech: Speech normal.         Behavior: Behavior normal. Behavior is cooperative.         Thought Content: Thought content normal.           Current Facility-Administered Medications:     acetaminophen (TYLENOL) tablet 650 mg, 650 mg, Oral, Q6H PRN, Arpan Coppola MD    albuterol (PROVENTIL HFA,VENTOLIN HFA) inhaler 1 puff, 1 puff, Inhalation, Q4H PRN, Arpan Coppola MD    aluminum-magnesium hydroxide-simethicone (MAALOX) oral suspension 15 mL, 15 mL, Oral, Q4H PRN, Arpan Coppola MD    apixaban (ELIQUIS) tablet 5 mg, 5 mg, Oral, BID, Arpan Coppola MD, 5 mg at 11/27/24 1733    atorvastatin (LIPITOR) tablet 10 mg, 10 mg, Oral, Daily, Arpan Coppola MD, 10 mg at 11/27/24 0934    budesonide-formoterol (SYMBICORT) 160-4.5 mcg/act inhaler 2 puff, 2 puff, Inhalation, BID, Arpan Coppola MD, 2  puff at 11/27/24 1734    bumetanide (BUMEX) tablet 6 mg, 6 mg, Oral, TID, Aminata Wilcox MD, 6 mg at 11/27/24 2117    cyanocobalamin (VITAMIN B-12) tablet 1,000 mcg, 1,000 mcg, Oral, Daily, Arpan Coppola MD, 1,000 mcg at 11/27/24 0934    Empagliflozin (JARDIANCE) tablet 10 mg, 10 mg, Oral, Daily, Arpan Coppola MD, 10 mg at 11/27/24 0937    famotidine (PEPCID) tablet 20 mg, 20 mg, Oral, BID, Arpan Coppola MD, 20 mg at 11/27/24 1733    fluticasone (FLONASE) 50 mcg/act nasal spray 1 spray, 1 spray, Nasal, BID, Arpan Coppola MD    insulin glargine (LANTUS) subcutaneous injection 20 Units 0.2 mL, 20 Units, Subcutaneous, HS, Enedina Gray PA-C, 20 Units at 11/27/24 2117    insulin lispro (HumALOG/ADMELOG) 100 units/mL subcutaneous injection 1-5 Units, 1-5 Units, Subcutaneous, TID AC, 1 Units at 11/27/24 1734 **AND** Fingerstick Glucose (POCT), , , TID AC, Arpan Coppola MD    loratadine (CLARITIN) tablet 10 mg, 10 mg, Oral, Daily, Arpan Coppola MD, 10 mg at 11/27/24 0934    metoprolol succinate (TOPROL-XL) 24 hr tablet 50 mg, 50 mg, Oral, Daily, Arpan Coppola MD, 50 mg at 11/27/24 0935    montelukast (SINGULAIR) tablet 10 mg, 10 mg, Oral, HS, Arpan Coppola MD, 10 mg at 11/27/24 2118    nitroglycerin (NITROSTAT) SL tablet 0.4 mg, 0.4 mg, Sublingual, Q5 Min PRN, Arpan Coppola MD    polyethylene glycol (MIRALAX) packet 17 g, 17 g, Oral, Daily, Arpan Coppola MD    potassium chloride (Klor-Con M20) CR tablet 40 mEq, 40 mEq, Oral, TID, Aster García PA-C, 40 mEq at 11/27/24 2117    pregabalin (LYRICA) capsule 75 mg, 75 mg, Oral, HS, Enedina Gray PA-C, 75 mg at 11/27/24 2118    sodium chloride (OCEAN) 0.65 % nasal spray 1 spray, 1 spray, Each Nare, Q1H PRN, Enedina Gray PA-C    spironolactone (ALDACTONE) tablet 25 mg, 25 mg, Oral, Daily, Henri Das MD, 25 mg at 11/27/24 1018    umeclidinium 62.5 mcg/actuation  inhaler AEPB 1 puff, 1 puff, Inhalation, Daily, Arpan Coppola MD, 1 puff at 11/27/24 0934    Labs & Results:      Results from last 7 days   Lab Units 11/26/24  0457 11/25/24  0451 11/24/24  0653   WBC Thousand/uL 7.51 7.16 7.66   HEMOGLOBIN g/dL 10.9* 11.5* 12.2   HEMATOCRIT % 34.6* 36.6 38.2   PLATELETS Thousands/uL 231 220 225         Results from last 7 days   Lab Units 11/28/24  0519 11/27/24  0939 11/26/24  1843 11/24/24  0653 11/23/24  1508   POTASSIUM mmol/L 4.0 4.0 4.9   < > 3.9   CHLORIDE mmol/L 98 97 95*   < > 101   CO2 mmol/L 30 30 26   < > 21   BUN mg/dL 34* 33* 33*   < > 21   CREATININE mg/dL 2.12* 2.11* 2.25*   < > 1.27   CALCIUM mg/dL 8.3* 8.2* 8.3*   < > 7.8*   ALK PHOS U/L  --   --   --   --  108*   ALT U/L  --   --   --   --  5*   AST U/L  --   --   --   --  10*    < > = values in this interval not displayed.         Aster García PA-C

## 2024-11-29 ENCOUNTER — TRANSITIONAL CARE MANAGEMENT (OUTPATIENT)
Dept: FAMILY MEDICINE CLINIC | Facility: CLINIC | Age: 59
End: 2024-11-29

## 2024-11-29 VITALS
WEIGHT: 314.6 LBS | SYSTOLIC BLOOD PRESSURE: 143 MMHG | BODY MASS INDEX: 41.69 KG/M2 | HEIGHT: 73 IN | TEMPERATURE: 97.5 F | RESPIRATION RATE: 18 BRPM | DIASTOLIC BLOOD PRESSURE: 79 MMHG | OXYGEN SATURATION: 100 % | HEART RATE: 71 BPM

## 2024-11-29 LAB
ANION GAP SERPL CALCULATED.3IONS-SCNC: 9 MMOL/L (ref 4–13)
BASOPHILS # BLD AUTO: 0.05 THOUSANDS/ΜL (ref 0–0.1)
BASOPHILS NFR BLD AUTO: 1 % (ref 0–1)
BUN SERPL-MCNC: 35 MG/DL (ref 5–25)
CALCIUM SERPL-MCNC: 8.2 MG/DL (ref 8.4–10.2)
CHLORIDE SERPL-SCNC: 95 MMOL/L (ref 96–108)
CO2 SERPL-SCNC: 31 MMOL/L (ref 21–32)
CREAT SERPL-MCNC: 2.16 MG/DL (ref 0.6–1.3)
EOSINOPHIL # BLD AUTO: 0.49 THOUSAND/ΜL (ref 0–0.61)
EOSINOPHIL NFR BLD AUTO: 6 % (ref 0–6)
ERYTHROCYTE [DISTWIDTH] IN BLOOD BY AUTOMATED COUNT: 17.2 % (ref 11.6–15.1)
GFR SERPL CREATININE-BSD FRML MDRD: 32 ML/MIN/1.73SQ M
GLUCOSE SERPL-MCNC: 122 MG/DL (ref 65–140)
GLUCOSE SERPL-MCNC: 167 MG/DL (ref 65–140)
GLUCOSE SERPL-MCNC: 207 MG/DL (ref 65–140)
HCT VFR BLD AUTO: 38.9 % (ref 36.5–49.3)
HGB BLD-MCNC: 12.4 G/DL (ref 12–17)
IMM GRANULOCYTES # BLD AUTO: 0.08 THOUSAND/UL (ref 0–0.2)
IMM GRANULOCYTES NFR BLD AUTO: 1 % (ref 0–2)
LYMPHOCYTES # BLD AUTO: 0.85 THOUSANDS/ΜL (ref 0.6–4.47)
LYMPHOCYTES NFR BLD AUTO: 10 % (ref 14–44)
MCH RBC QN AUTO: 29 PG (ref 26.8–34.3)
MCHC RBC AUTO-ENTMCNC: 31.9 G/DL (ref 31.4–37.4)
MCV RBC AUTO: 91 FL (ref 82–98)
MONOCYTES # BLD AUTO: 0.69 THOUSAND/ΜL (ref 0.17–1.22)
MONOCYTES NFR BLD AUTO: 8 % (ref 4–12)
NEUTROPHILS # BLD AUTO: 6.34 THOUSANDS/ΜL (ref 1.85–7.62)
NEUTS SEG NFR BLD AUTO: 74 % (ref 43–75)
NRBC BLD AUTO-RTO: 0 /100 WBCS
PLATELET # BLD AUTO: 287 THOUSANDS/UL (ref 149–390)
PMV BLD AUTO: 10.1 FL (ref 8.9–12.7)
POTASSIUM SERPL-SCNC: 4.2 MMOL/L (ref 3.5–5.3)
RBC # BLD AUTO: 4.27 MILLION/UL (ref 3.88–5.62)
SODIUM SERPL-SCNC: 135 MMOL/L (ref 135–147)
WBC # BLD AUTO: 8.5 THOUSAND/UL (ref 4.31–10.16)

## 2024-11-29 PROCEDURE — 80048 BASIC METABOLIC PNL TOTAL CA: CPT | Performed by: PHYSICIAN ASSISTANT

## 2024-11-29 PROCEDURE — 99239 HOSP IP/OBS DSCHRG MGMT >30: CPT | Performed by: NURSE PRACTITIONER

## 2024-11-29 PROCEDURE — 99232 SBSQ HOSP IP/OBS MODERATE 35: CPT | Performed by: PHYSICIAN ASSISTANT

## 2024-11-29 PROCEDURE — 85025 COMPLETE CBC W/AUTO DIFF WBC: CPT | Performed by: NURSE PRACTITIONER

## 2024-11-29 PROCEDURE — 82948 REAGENT STRIP/BLOOD GLUCOSE: CPT

## 2024-11-29 RX ORDER — METOLAZONE 2.5 MG/1
5 TABLET ORAL 3 TIMES WEEKLY
Qty: 180 TABLET | Refills: 0 | Status: CANCELLED | OUTPATIENT
Start: 2024-11-29

## 2024-11-29 RX ORDER — SPIRONOLACTONE 25 MG/1
25 TABLET ORAL DAILY
Qty: 30 TABLET | Refills: 0 | Status: SHIPPED | OUTPATIENT
Start: 2024-11-30

## 2024-11-29 RX ORDER — ECHINACEA PURPUREA EXTRACT 125 MG
1 TABLET ORAL
Qty: 37.5 ML | Refills: 0 | Status: SHIPPED | OUTPATIENT
Start: 2024-11-29

## 2024-11-29 RX ORDER — METOLAZONE 2.5 MG/1
5 TABLET ORAL 3 TIMES WEEKLY
Qty: 180 TABLET | Refills: 0 | Status: SHIPPED | OUTPATIENT
Start: 2024-11-29

## 2024-11-29 RX ADMIN — POTASSIUM CHLORIDE 40 MEQ: 1500 TABLET, EXTENDED RELEASE ORAL at 09:10

## 2024-11-29 RX ADMIN — UMECLIDINIUM 1 PUFF: 62.5 AEROSOL, POWDER ORAL at 09:12

## 2024-11-29 RX ADMIN — BUDESONIDE AND FORMOTEROL FUMARATE DIHYDRATE 2 PUFF: 160; 4.5 AEROSOL RESPIRATORY (INHALATION) at 09:12

## 2024-11-29 RX ADMIN — BUMETANIDE 6 MG: 2 TABLET ORAL at 09:10

## 2024-11-29 RX ADMIN — CYANOCOBALAMIN TAB 500 MCG 1000 MCG: 500 TAB at 09:10

## 2024-11-29 RX ADMIN — EMPAGLIFLOZIN 10 MG: 10 TABLET, FILM COATED ORAL at 09:12

## 2024-11-29 RX ADMIN — SPIRONOLACTONE 25 MG: 25 TABLET ORAL at 09:11

## 2024-11-29 RX ADMIN — APIXABAN 5 MG: 5 TABLET, FILM COATED ORAL at 09:10

## 2024-11-29 RX ADMIN — FAMOTIDINE 20 MG: 20 TABLET, FILM COATED ORAL at 09:11

## 2024-11-29 RX ADMIN — METOPROLOL SUCCINATE 50 MG: 50 TABLET, EXTENDED RELEASE ORAL at 09:11

## 2024-11-29 RX ADMIN — ATORVASTATIN CALCIUM 10 MG: 10 TABLET, FILM COATED ORAL at 09:11

## 2024-11-29 RX ADMIN — LORATADINE 10 MG: 10 TABLET ORAL at 09:10

## 2024-11-29 NOTE — PROGRESS NOTES
Advanced Heart Failure / Pulmonary Hypertension Outpatient Visit    Mesfin Cano 59 y.o. male   MRN: [id number]  Encounter: [id number]    Assessment:  Patient Active Problem List    Diagnosis Date Noted    Iron deficiency 10/07/2024    Onychomycosis 10/05/2024    Electrolyte abnormality 10/05/2024    Anemia 07/31/2024    Bilateral leg edema 07/02/2024    Abnormal stress test 06/26/2024    Uncontrolled atrial flutter (HCC) 06/18/2024    Moderate persistent asthma without complication 06/18/2024    Status post cholecystectomy 02/17/2024    Uncontrolled type 2 diabetes mellitus with hyperglycemia (HCC) 12/23/2023    Eosinophilia 10/18/2023    Anemia due to chronic kidney disease 10/16/2023    Nasal congestion 10/06/2023    Loose stools 04/17/2023    Acute on chronic HFpEF, NYHA III, stage C 04/10/2023    Diabetic polyneuropathy associated with type 2 diabetes mellitus (HCC) 12/20/2022    Stage 3a chronic kidney disease (HCC) 09/16/2022    Presence of CardioMEMS HF system 03/09/2022    Paroxysmal atrial fibrillation (HCC) 03/01/2022    Hypokalemia 11/14/2021    HNP (herniated nucleus pulposus), lumbar 02/23/2021    Foraminal stenosis of lumbar region 02/23/2021    VICKY (obstructive sleep apnea)     Hyperlipidemia 04/18/2019    Primary osteoarthritis of both knees 06/14/2018    Irritable bowel syndrome with diarrhea 01/30/2018    Primary hypertension 01/30/2018    Vitamin B12 deficiency 03/08/2016    Vitamin D deficiency 03/08/2016    Morbid obesity (HCC) 02/23/2016    Primary osteoarthritis of right knee 10/15/2013       Today's Plan:  Continue Bumex 6 mg TID + Metolazone  3x weekly.  Volume status stable, weight at  lb on home scale  Wearing compression stockings.  Continue Spironolactone 25 daily, Jardiance 10 daily  BMP done today- will check results and call with abnormalities  RTO w/HF AP 2 weeks, Dr. Wilcox 01/07/25    Plan:  Acute on chronic HFpEF; LVEF 55%; LVIDd 6.0 cm  Etiology: Afib, HTN, obesity  phenotype.  C 09/06/2022: no obstructive CAD.  TTE 04/11/2023: LVEF 50%. LVIDd 6.6 cm. Grade 2 DD. Normal RV. Trace MR and TR. Normal IVC.   RHC / MEMS calibration 10/11/2023: CardioMEMS data correlates to RHC.   TTE 06/20/2024: LVEF 55%. Normal RV. BRIAN (L>R). Normal IVC.     Last bmp 11/29: , k 4.2, 2.16 (at [facility])  Volume status: stable on exam  Weights: 10/29: 319 on office scale--> Today 323 lbs (315 lbs at home since dc)  BP today: 112/60 HR 80     Guideline Directed Medical Therapy:  --ARNi / ACEi / ARB: none.   --Aldosterone Antagonist: spironolactone 25 mg daily.   --SGLT2 Inhibitor: empagliflozin 10 mg daily.      Volume Management:  --Home Diuretic: Bumex 6 mg TID with metolazone 5 mg 3 x weekly- Tu/Thurs/Sat  --K supplementation: potassium 40 mEq BID.                  Advanced Therapies:              --CardioMEMS: implanted 03/09/2022. PAD goal=12 mmHg.     Atrial fibrillation, parosxysmal              [id number] = 3 (HF, HTN, DM).              Anticoagulation on Eliquis.               Rate control: metoprolol succinate 50 mg daily.              Rhythm control: No.     Chronic kidney disease, stage IIIb              Baseline creatinine of 1.5-2.0.     Hypertension  Hyperlipidemia  Asthma, severe persistent: follows with Dr. Noonan as outpatient.   Obstructive sleep apnea: without CPAP and equipment for >12 months. In process of getting new equipment.   Diabetes mellitus, type II  History of gastric bypass (Samuel-en-Y) surgery   History of tobacco abuse     HPI:   Mesfin Cano is a 59-year-old male with a PMH as above who presents for follow up. Follows with Dr. Wilcox.     59 year old male with chronic HFpEF, atrial fibrillation/flutter, hypertension, hyperlipidemia and CKD 3b recently admitted for decompensated heart failure and failed outpatient escalation of diuretics. Diuresed on Bumex drip and transitioned to Bumex 6 mg TID with twice weekly and PRN metolazone.     10/29/24: Presents today for  follow up. Recently recovered from the flu. Was having some issues with weight gain; was 335 lbs on home scale over the weekend, down to 322 lbs on home scale today after taking metolazone over the weekend. Was 319 lbs (reported dry weight) on office scale today. Breathing at baseline, no PND. Sleeps on his couch at home. Does have swelling in his ankles and feet. No abdominal distension.     11/23/24-11/29/24: Hospital Admit: Worsening shortness of breath and lower extremity swelling. Pt reports symptoms are worse than day prior where he also noted some abdominal distension. He has a cardiac pressure sensor and the patient was notified by his RN that the pressure and increased to around 20. Pt also reports some chest discomfort. He reported that he is very compliant with his medications and diet management. Pt was admitted and seen by Heart failure where he was started on a bumex drip and metolazone as needed, along with aggressive electrolyte repletion before transitioning to PO Bumex + metolazone at PR    12/04/24; post hosp f/u: Has no cardiac complaints.  States his weights are at baseline of 315 -316 since OR.  Following a 2 liter FR and low NA diet.  Wife cooks at home. Voiding well on diuretics.  Stated his HF symptoms and weight gain appear rapidly without change in diet, fluid intake or medications.  He normally gains fluid in his legs, and abdomen accompanied with SOB.           Past Medical History:   Diagnosis Date    Acute gastritis without hemorrhage     last assessed 3/24/17    Asthma     Atrial fibrillation (HCC)     Bilateral leg edema 02/19/2020    CHF (congestive heart failure) (HCC)     Coronary artery disease     Daytime sleepiness 07/17/2019    Diabetes mellitus (HCC)     Dyspnea on exertion 10/11/2021    Edema of both feet 01/23/2020    Gastric bypass status for obesity     Hyperlipidemia     Hypertension     Hypokalemia 11/14/2021    Impaired fasting glucose     last assessed 5/10/17    Knee  sprain, bilateral 06/14/2018    Leg cramps 06/16/2022    Obesity     Viral gastroenteritis     last assessed 11/4/16       Review of Systems   Constitutional:  Negative for activity change, appetite change and unexpected weight change.   HENT: Negative.     Eyes: Negative.    Respiratory:  Negative for shortness of breath.    Cardiovascular:  Negative for chest pain, palpitations and leg swelling.   Gastrointestinal:  Negative for abdominal distention.   Endocrine: Negative.    Genitourinary: Negative.    Musculoskeletal: Negative.    Allergic/Immunologic: Negative.    Neurological: Negative.  Negative for dizziness.   Hematological: Negative.    Psychiatric/Behavioral: Negative.         Allergies   Allergen Reactions    Azithromycin Other (See Comments)     Shaky, uneasy feeling     Bactrim [Sulfamethoxazole-Trimethoprim] Other (See Comments)     shakiness         Current Outpatient Medications:     Accu-Chek FastClix Lancets MISC, Test blood sugar twice daily (Patient taking differently: Takes blood sugar three times a week provider aware), Disp: 200 each, Rfl: 3    acetaminophen (TYLENOL) 325 mg tablet, Take 2 tablets (650 mg total) by mouth every 6 (six) hours as needed for mild pain, headaches or fever, Disp: , Rfl:     albuterol (PROVENTIL HFA,VENTOLIN HFA) 90 mcg/act inhaler, INHALE 2 PUFFS EVERY 4 HOURS AS NEEDED FOR WHEEZING OR SHORTNESS OF BREATH, Disp: 18 g, Rfl: 5    aluminum-magnesium hydroxide 200-200 MG/5ML suspension, Take 5 mL by mouth every 6 (six) hours as needed for heartburn, Disp: 355 mL, Rfl: 0    atorvastatin (LIPITOR) 10 mg tablet, TAKE 1 TABLET BY MOUTH EVERY DAY, Disp: 90 tablet, Rfl: 3    Blood Glucose Monitoring Suppl (Accu-Chek Guide) w/Device KIT, Use 2 (two) times a day Test blood sugar twice daily, Disp: 1 kit, Rfl: 0    budesonide-formoterol (Symbicort) 160-4.5 mcg/act inhaler, Inhale 2 puffs 2 (two) times a day Rinse mouth after use., Disp: 30.6 g, Rfl: 2    cyanocobalamin (VITAMIN  B-12) 1000 MCG tablet, Take 1 tablet (1,000 mcg total) by mouth daily, Disp: 30 tablet, Rfl: 0    Eliquis 5 MG, TAKE 1 TABLET BY MOUTH TWICE A DAY, Disp: 60 tablet, Rfl: 5    Empagliflozin (JARDIANCE) 10 MG TABS tablet, Take 1 tablet (10 mg total) by mouth daily, Disp: 30 tablet, Rfl: 11    famotidine (PEPCID) 20 mg tablet, Take 1 tablet (20 mg total) by mouth if needed for heartburn As needed twice a day, Disp: , Rfl:     fluticasone (FLONASE) 50 mcg/act nasal spray, 1 spray into each nostril 2 (two) times a day, Disp: , Rfl: 0    loratadine (CLARITIN) 10 mg tablet, Take 1 tablet (10 mg total) by mouth if needed for allergies As needed daily, Disp: , Rfl:     metolazone (ZAROXOLYN) 2.5 mg tablet, Take 2 tablets (5 mg total) by mouth 3 (three) times a week Take 20-30 minutes before Bumex dose and with additional potassium, Disp: 180 tablet, Rfl: 0    metoprolol succinate (TOPROL-XL) 50 mg 24 hr tablet, Take 1 tablet (50 mg total) by mouth daily, Disp: 30 tablet, Rfl: 5    nitroglycerin (NITROSTAT) 0.4 mg SL tablet, Place 1 tablet (0.4 mg total) under the tongue every 5 (five) minutes as needed for chest pain for up to 50 doses, Disp: 50 tablet, Rfl: 0    potassium chloride (Klor-Con M20) 20 mEq tablet, Take 2 tablets (40 mEq total) by mouth 3 (three) times a day, Disp: 180 tablet, Rfl: 0    pregabalin (LYRICA) 50 mg capsule, Take 1 caps. at bedtime, Disp: 30 capsule, Rfl: 5    sitaGLIPtin (Januvia) 100 mg tablet, TAKE 1/2 TABLET BY MOUTH EVERY DAY, Disp: 15 tablet, Rfl: 5    sodium chloride (OCEAN) 0.65 % nasal spray, 1 spray into each nostril every hour as needed for congestion, Disp: 37.5 mL, Rfl: 0    spironolactone (ALDACTONE) 25 mg tablet, Take 1 tablet (25 mg total) by mouth daily, Disp: 30 tablet, Rfl: 0    bumetanide (BUMEX) 2 mg tablet, Take 3 tablets (6 mg total) by mouth 3 (three) times a day, Disp: 270 tablet, Rfl: 0    montelukast (SINGULAIR) 10 mg tablet, Take 1 tablet (10 mg total) by mouth daily at  bedtime, Disp: 90 tablet, Rfl: 1    tiotropium (Spiriva Respimat) 1.25 MCG/ACT AERS inhaler, Inhale 2 puffs daily, Disp: 4 g, Rfl: 0    Social History     Socioeconomic History    Marital status: /Civil Union     Spouse name: Not on file    Number of children: Not on file    Years of education: Not on file    Highest education level: Not on file   Occupational History    Not on file   Tobacco Use    Smoking status: Former     Current packs/day: 1.00     Average packs/day: 1 pack/day for 5.0 years (5.0 ttl pk-yrs)     Types: Pipe, Cigars, Cigarettes     Start date: 2016     Quit date: 1/1/2021     Passive exposure: Never    Smokeless tobacco: Former    Tobacco comments:     cigar once a day   Vaping Use    Vaping status: Never Used   Substance and Sexual Activity    Alcohol use: Yes     Alcohol/week: 2.0 standard drinks of alcohol     Types: 2 Shots of liquor per week     Comment: social    Drug use: No    Sexual activity: Yes     Partners: Female     Birth control/protection: None   Other Topics Concern    Not on file   Social History Narrative    Not on file     Social Drivers of Health     Financial Resource Strain: Not on file   Food Insecurity: No Food Insecurity (11/23/2024)    Nursing - Inadequate Food Risk Classification     Worried About Running Out of Food in the Last Year: Sometimes true     Ran Out of Food in the Last Year: Never true     Ran Out of Food in the Last Year: 1   Recent Concern: Food Insecurity - Food Insecurity Present (10/5/2024)    Nursing - Inadequate Food Risk Classification     Worried About Running Out of Food in the Last Year: Sometimes true     Ran Out of Food in the Last Year: Never true     Ran Out of Food in the Last Year: Not on file   Transportation Needs: No Transportation Needs (11/23/2024)    Nursing - Transportation Risk Classification     Lack of Transportation: Not on file     Lack of Transportation: 2   Physical Activity: Not on file   Stress: Not on file   Social  "Connections: Not on file   Intimate Partner Violence: Unknown (2024)    Nursing IPS     Feels Physically and Emotionally Safe: Not on file     Physically Hurt by Someone: Not on file     Humiliated or Emotionally Abused by Someone: Not on file     Physically Hurt by Someone: 2     Hurt or Threatened by Someone: 2   Housing Stability: Unknown (2024)    Nursing: Inadequate Housing Risk Classification     Has Housing: Not on file     Worried About Losing Housing: Not on file     Unable to Get Utilities: Not on file     Unable to Pay for Housing in the Last Year: 2     Has Housin     Family History   Problem Relation Age of Onset    Stroke Mother     Hypertension Father     Cancer Father     COPD Father        Vitals:   Blood pressure 112/60, pulse 80, height 6' 1\" (1.854 m), weight (!) 147 kg (323 lb 6.4 oz).    Wt Readings from Last 10 Encounters:   24 (!) 147 kg (323 lb 6.4 oz)   24 (!) 143 kg (314 lb 9.6 oz)   10/29/24 (!) 145 kg (319 lb)   10/10/24 (!) 145 kg (319 lb)   10/03/24 (!) 150 kg (331 lb 11.2 oz)   10/01/24 (!) 147 kg (324 lb)   24 (!) 144 kg (317 lb)   24 (!) 145 kg (319 lb)   24 (!) 149 kg (328 lb 3.2 oz)   24 (!) 144 kg (318 lb 6.4 oz)     Vitals:    24 1252   BP: 112/60   BP Location: Left arm   Patient Position: Sitting   Cuff Size: Large   Pulse: 80   Weight: (!) 147 kg (323 lb 6.4 oz)   Height: 6' 1\" (1.854 m)       Physical Exam  Constitutional:       Appearance: He is obese.   Eyes:      Extraocular Movements: Extraocular movements intact.   Neck:      Vascular: No JVD.   Cardiovascular:      Rate and Rhythm: Normal rate and regular rhythm.      Pulses:           Radial pulses are 2+ on the right side and 2+ on the left side.      Heart sounds: Normal heart sounds, S1 normal and S2 normal.   Pulmonary:      Effort: No respiratory distress.      Breath sounds: Normal air entry. Decreased breath sounds present.   Abdominal:      General: " There is no distension.   Musculoskeletal:      Right lower le+ Edema present.      Left lower le+ Edema present.   Skin:     General: Skin is warm and dry.   Neurological:      Mental Status: He is alert and oriented to person, place, and time. Mental status is at baseline.   Psychiatric:         Behavior: Behavior is cooperative.         Cognition and Memory: Cognition normal.       Labs & Results:  Lab Results   Component Value Date    WBC 8.50 2024    HGB 12.4 2024    HCT 38.9 2024    MCV 91 2024     2024     Lab Results   Component Value Date    SODIUM 135 2024    K 4.2 2024    CL 95 (L) 2024    CO2 31 2024    BUN 35 (H) 2024    CREATININE 2.16 (H) 2024    GLUC 207 (H) 2024    CALCIUM 8.2 (L) 2024     Lab Results   Component Value Date    INR 1.29 (H) 10/12/2023    PROTIME 15.9 (H) 10/12/2023     Lab Results   Component Value Date     (H) 2024      RODRIGUE Sahu

## 2024-11-29 NOTE — ASSESSMENT & PLAN NOTE
Wt Readings from Last 3 Encounters:   11/28/24 (!) 143 kg (314 lb 9.6 oz)   10/29/24 (!) 145 kg (319 lb)   10/10/24 (!) 145 kg (319 lb)     Acute on chronic diastolic congestive heart failure.  Patient has a cardiac sensor and was told by his heart failure RN pressure is increased to 20  HF team following.  Was on Bumex gtt, transitioned to PO Bumex 6 mg TID on 11/27.  Monitor response.  Added spironolactone 25 mg daily 11/27.  Plan to increase outpt Metolazone to 5 mg on MWF schedule (with additional 20 meq potassium)   Ok for discharge  Monitor elecrolytes   Continue metoprolol  Empagliflozin 10 mg daily  Monitor and replete electrolytes  Low-salt diet

## 2024-11-29 NOTE — DISCHARGE SUMMARY
Discharge Summary - Hospitalist   Name: Mesfin Cano 59 y.o. male I MRN: [id number]  Unit/Bed#: [facility] 410-01 I Date of Admission: 11/23/2024   Date of Service: 11/30/2024 I Hospital Day: 6     Assessment & Plan  Acute on chronic HFpEF, NYHA III, stage C  Wt Readings from Last 3 Encounters:   11/28/24 (!) 143 kg (314 lb 9.6 oz)   10/29/24 (!) 145 kg (319 lb)   10/10/24 (!) 145 kg (319 lb)     Acute on chronic diastolic congestive heart failure.  Patient has a cardiac sensor and was told by his heart failure RN pressure is increased to 20  HF team following.  Was on Bumex gtt, transitioned to PO Bumex 6 mg TID on 11/27.  Monitor response.  Added spironolactone 25 mg daily 11/27.  Plan to increase outpt Metolazone to 5 mg on MWF schedule (with additional 20 meq potassium)   Ok for discharge  Monitor elecrolytes   Continue metoprolol  Empagliflozin 10 mg daily  Monitor and replete electrolytes  Low-salt diet  Morbid obesity (HCC)  Therapeutic lifestyle modification encouraged  VICKY (obstructive sleep apnea)  Patient reports his CPAP machine is on recall.  Outpatient sleep study encouraged  CPAP compliance encouraged  Paroxysmal atrial fibrillation (HCC)  Continue metoprolol XL 50 mg p.o. daily  Eliquis anticoagulation  Uncontrolled type 2 diabetes mellitus with hyperglycemia (HCC)  Lab Results   Component Value Date    HGBA1C 7.3 (H) 11/23/2024       Recent Labs     11/28/24  1718 11/28/24  2107 11/29/24  0802 11/29/24  1139   POCGLU 206* 174* 122 167*       Blood Sugar Average: Last 72 hrs:  (P) 167.1  Diabetes mellitus type 2 uncontrolled  Hold sitagliptin while inpatient  Lantus  -- with hyperglycemia increase to 20 units daily  Titrate insulin dose based on Accu-Cheks  Outpatient endocrinology follow-up for better diabetes control encouraged  Monitor Accu-Cheks  Avoidable glycemia  Primary hypertension  Blood pressure is at goal  Continue metoprolol 50 mg daily  Hyperlipidemia  Continue atorvastatin  Presence of  CardioMEMS HF system  Mgmt per HF      Medical Problems       Resolved Problems  Date Reviewed: 11/23/2024   None       Discharging Physician / Practitioner: RODRIGUE Wagner  PCP: Soo Chavez MD  Admission Date:   Admission Orders (From admission, onward)       Ordered        11/23/24 1630  INPATIENT ADMISSION  Once                          Discharge Date: 11/29/24    Consultations During Hospital Stay:  Dr Darden - cardiology     Procedures Performed:   BUN /Creatinine 36/2.16  Chest xray 11/23: with no acute cardiopulmonary disease     Significant Findings / Test Results:   See above     Incidental Findings:   None     Test Results Pending at Discharge (will require follow up):   None      Outpatient Tests Requested:  Call to schedule follow up within the next week with your pcp  Call to schedule follow up cardiology within the next week       Complications:  None    Reason for Admission: worsening shortness of breath and lower extremity edema.     Hospital Course:   Mesfin Cano is a 59 y.o. male patient who originally presented to the hospital on 11/23/2024 due to worsening shortness of breath and lower extremity swelling. Pt has a past medical hx obesity, DM type 2, PAF, Chronic diastolic heart failure, obstructive sleep apnea. Now presenting with sob and lower extremity swelling. Pt reports symptoms are worse than day prior where he also noted some abdominal distension. He has a cardiac pressure sensor and the patient was notified by his RN that the pressure and increased. Pt also reports some chest discomfort. Has no hx of hematemesis, melena, hematochezia. No chest tightness or wheezing. He reported that he is very compliant with his medications and diet management. Pt was admitted and seen by Heart failure where he was started on a bumex drip and metalozone as needed, along with aggressive electrolyte repletion. Eventually weaned to po with plan for discharge home.   Pt will be discharged home  "on his bumex and spironolactone but with increase in his outpt metalazone to 5 mg m w f  and an additional 20 meq potassium. He will have fu on 12/4/24 . Should call to schedule follow up with his pcp in the next week         Please see above list of diagnoses and related plan for additional information.     Condition at Discharge: fair    Discharge Day Visit / Exam:   Subjective:  Pt reports he is feeling really good today no sob no chest or abdominal discomfort or tightening. He has no dizziness or lightheadedness   Vitals: Blood Pressure: 143/79 (11/29/24 0700)  Pulse: 71 (11/29/24 0700)  Temperature: 97.5 °F (36.4 °C) (11/29/24 0700)  Temp Source: Oral (11/29/24 0700)  Respirations: 18 (11/29/24 0700)  Height: 6' 1\" (185.4 cm) (11/23/24 2100)  Weight - Scale: (!) 143 kg (314 lb 9.6 oz) (11/28/24 0500)  SpO2: 100 % (11/29/24 0700)  Physical Exam  Constitutional:       General: He is not in acute distress.     Appearance: He is obese. He is not ill-appearing, toxic-appearing or diaphoretic.   HENT:      Head: Normocephalic and atraumatic.      Mouth/Throat:      Pharynx: No oropharyngeal exudate.   Eyes:      General:         Right eye: No discharge.         Left eye: No discharge.   Cardiovascular:      Rate and Rhythm: Normal rate.      Heart sounds: No murmur heard.     No friction rub. No gallop.   Pulmonary:      Effort: No respiratory distress.      Breath sounds: No stridor. No wheezing, rhonchi or rales.   Chest:      Chest wall: No tenderness.   Abdominal:      General: There is no distension.      Palpations: There is no mass.      Tenderness: There is no abdominal tenderness. There is no guarding or rebound.      Hernia: No hernia is present.   Musculoskeletal:         General: No swelling, tenderness, deformity or signs of injury.      Right lower leg: Edema present.      Left lower leg: Edema present.   Skin:     Coloration: Skin is pale. Skin is not jaundiced.      Findings: No bruising, erythema, " lesion or rash.   Neurological:      Mental Status: He is alert and oriented to person, place, and time.   Psychiatric:         Behavior: Behavior normal.          Discussion with Family: Patient declined call to .     Discharge instructions/Information to patient and family:   See after visit summary for information provided to patient and family.      Provisions for Follow-Up Care:  See after visit summary for information related to follow-up care and any pertinent home health orders.      Mobility at time of Discharge:   Basic Mobility Inpatient Raw Score: 24  JH-HLM Goal: 8: Walk 250 feet or more  JH-HLM Achieved: 7: Walk 25 feet or more  HLM Goal NOT achieved. Continue to encourage mobility in post discharge setting.     Disposition:   Home    Planned Readmission: no plan for readmission high risk for readmission    Discharge Medications:  See after visit summary for reconciled discharge medications provided to patient and/or family.      Administrative Statements   Discharge Statement:  I have spent a total time of 47 minutes in caring for this patient on the day of the visit/encounter. >30 minutes of time was spent on: Risks and benefits of tx options, Patient and family education, Counseling / Coordination of care, Documenting in the medical record, and Communicating with other healthcare professionals .    **Please Note: This note may have been constructed using a voice recognition system**

## 2024-11-29 NOTE — ASSESSMENT & PLAN NOTE
Lab Results   Component Value Date    HGBA1C 7.3 (H) 11/23/2024       Recent Labs     11/28/24  1718 11/28/24  2107 11/29/24  0802 11/29/24  1139   POCGLU 206* 174* 122 167*       Blood Sugar Average: Last 72 hrs:  (P) 167.1  Diabetes mellitus type 2 uncontrolled  Hold sitagliptin while inpatient  Lantus  -- with hyperglycemia increase to 20 units daily  Titrate insulin dose based on Accu-Cheks  Outpatient endocrinology follow-up for better diabetes control encouraged  Monitor Accu-Cheks  Avoidable glycemia

## 2024-11-29 NOTE — PROGRESS NOTES
Advanced Heart Failure / Pulmonary Hypertension Service Progress Note    Mesfin Cano 59 y.o. male   MRN: [id number]  Unit/Bed#: [facility] 410-01; Encounter: [id number]    Assessment:  Principal Problem:    Acute on chronic HFpEF, NYHA III, stage C  Active Problems:    Primary hypertension    Hyperlipidemia    Morbid obesity (HCC)    VICKY (obstructive sleep apnea)    Paroxysmal atrial fibrillation (HCC)    Uncontrolled type 2 diabetes mellitus with hyperglycemia (HCC)    Presence of CardioMEMS HF system    Subjective:   Patient seen and examined. No significant events overnight. Feeling well today. No current cardiac complaints. Ready for discharge today.     Objective:   Intake/ Output: 800 mL / 2650 mL.  Weight: 315 lbs (per patient) (314 lbs on 11/28).  Telemetry: N/A.  MAPs: .    Today's Plan:  Continue current PO Bumex and spironolactone doses.  Increase outpatient metolazone dosing to 5 mg on TTSa schedule (with additional 20 mEq potassium).  Potassium 4.2 this AM; goal >4. Continue current PO supplementation.  Plan to check post-hospital BMP early next week.  Stable for discharge from HF standpoint. Hospital follow-up scheduled for 12/04/2024.    Plan:  Acute on chronic HFpEF; LVEF 55%; LVIDd 6.0 cm              Etiology: Afib, HTN, obesity phenotype.              RHC / MEMS calibration 10/11/2023: CardioMEMS data correlates to RHC.               TTE 06/20/2024: LVEF 55%. Normal RV. BRIAN (L>R). Normal IVC.      Guideline Directed Medical Therapy:  --Aldosterone Antagonist: spironolactone 25 mg daily.   --SGLT2 Inhibitor: empagliflozin 10 mg daily.      Volume Management:  --Home Diuretic: Bumex 6 mg TID with metolazone 5 mg Wed/Sat.   --Inpatient Diuretic: PO Bumex 6 mg TID with metolazone 5 mg TTSa.   --K supplementation: potassium 40 mEq TID.                  Advanced Therapies:              --CardioMEMS: implanted 03/09/2022. Goal PAd of 12 mmHg.     Atrial fibrillation, parosxysmal               "[id number] = 3 (HF, HTN, DM).              Anticoagulation on Eliquis.               Rate control: metoprolol succinate 50 mg daily.              Rhythm control: No.     Chronic kidney disease, stage IIIb              Baseline creatinine of 1.5-2.0.              Today, creatinine of 2.16 (2.12 on 11/28).     Hypertension  Hyperlipidemia  Asthma, severe persistent: follows with Dr. Noonan as outpatient.   Obstructive sleep apnea  Diabetes mellitus, type II  History of gastric bypass (Samuel-en-Y) surgery   History of tobacco abuse    Vitals:   Blood pressure 143/79, pulse 71, temperature 97.5 °F (36.4 °C), temperature source Oral, resp. rate 18, height 6' 1\" (1.854 m), weight (!) 143 kg (314 lb 9.6 oz), SpO2 100%.    I/O last 3 completed shifts:  In: 920 [P.O.:910; I.V.:10]  Out: 4550 [Urine:4550]    Weight (last 2 days)       Date/Time Weight    11/28/24 0500 143 (314.6)    11/27/24 0500 144 (317)          Wt Readings from Last 10 Encounters:   11/28/24 (!) 143 kg (314 lb 9.6 oz)   10/29/24 (!) 145 kg (319 lb)   10/10/24 (!) 145 kg (319 lb)   10/03/24 (!) 150 kg (331 lb 11.2 oz)   10/01/24 (!) 147 kg (324 lb)   08/20/24 (!) 144 kg (317 lb)   08/08/24 (!) 145 kg (319 lb)   08/08/24 (!) 149 kg (328 lb 3.2 oz)   08/04/24 (!) 144 kg (318 lb 6.4 oz)   07/19/24 (!) 150 kg (330 lb)     Vitals:    11/28/24 0700 11/28/24 0938 11/28/24 1500 11/29/24 0700   BP: 124/66  125/78 143/79   BP Location: Right arm  Right arm Right arm   Pulse: 64  68 71   Resp: 18  18 18   Temp: 97.6 °F (36.4 °C)  98 °F (36.7 °C) 97.5 °F (36.4 °C)   TempSrc: Oral  Oral Oral   SpO2: 98% 98% 97% 100%   Weight:       Height:           Physical Exam  Vitals reviewed.   Constitutional:       General: He is awake. He is not in acute distress.     Appearance: Normal appearance. He is well-developed. He is obese. He is not ill-appearing, toxic-appearing or diaphoretic.   HENT:      Head: Normocephalic.      Nose: Nose normal.      Mouth/Throat:      Mouth: " Mucous membranes are moist.   Eyes:      General: No scleral icterus.     Conjunctiva/sclera: Conjunctivae normal.   Cardiovascular:      Rate and Rhythm: Normal rate and regular rhythm.   Pulmonary:      Effort: Pulmonary effort is normal. No tachypnea, bradypnea or respiratory distress.      Breath sounds: Normal air entry. No decreased air movement. No decreased breath sounds or wheezing.   Abdominal:      Palpations: Abdomen is soft.      Tenderness: There is no abdominal tenderness.   Musculoskeletal:      Cervical back: Neck supple.      Right lower leg: Edema (trace) present.      Left lower leg: Edema (trace) present.   Skin:     General: Skin is warm and dry.      Coloration: Skin is pale. Skin is not jaundiced.   Neurological:      Mental Status: He is alert and oriented to person, place, and time.   Psychiatric:         Attention and Perception: Attention normal.         Mood and Affect: Mood and affect normal.         Speech: Speech normal.         Behavior: Behavior normal. Behavior is cooperative.         Thought Content: Thought content normal.           Current Facility-Administered Medications:     acetaminophen (TYLENOL) tablet 650 mg, 650 mg, Oral, Q6H PRN, Arpan Coppola MD, 650 mg at 11/28/24 1358    albuterol (PROVENTIL HFA,VENTOLIN HFA) inhaler 1 puff, 1 puff, Inhalation, Q4H PRN, Arpan Coppola MD    aluminum-magnesium hydroxide-simethicone (MAALOX) oral suspension 15 mL, 15 mL, Oral, Q4H PRN, Arpan Copploa MD    apixaban (ELIQUIS) tablet 5 mg, 5 mg, Oral, BID, Arpan Coppola MD, 5 mg at 11/29/24 0910    atorvastatin (LIPITOR) tablet 10 mg, 10 mg, Oral, Daily, Arpan Coppola MD, 10 mg at 11/29/24 0911    budesonide-formoterol (SYMBICORT) 160-4.5 mcg/act inhaler 2 puff, 2 puff, Inhalation, BID, Arpan Coppola MD, 2 puff at 11/29/24 0912    bumetanide (BUMEX) tablet 6 mg, 6 mg, Oral, TID, Aminata Wilcox MD, 6 mg at 11/29/24 0910     cyanocobalamin (VITAMIN B-12) tablet 1,000 mcg, 1,000 mcg, Oral, Daily, Arpan Coppola MD, 1,000 mcg at 11/29/24 0910    Empagliflozin (JARDIANCE) tablet 10 mg, 10 mg, Oral, Daily, Arpan Coppola MD, 10 mg at 11/29/24 0912    famotidine (PEPCID) tablet 20 mg, 20 mg, Oral, BID, Arpan Coppola MD, 20 mg at 11/29/24 0911    fluticasone (FLONASE) 50 mcg/act nasal spray 1 spray, 1 spray, Nasal, BID, Arpan Coppola MD    insulin glargine (LANTUS) subcutaneous injection 20 Units 0.2 mL, 20 Units, Subcutaneous, HS, Enedina Gray PA-C, 20 Units at 11/28/24 2225    insulin lispro (HumALOG/ADMELOG) 100 units/mL subcutaneous injection 1-5 Units, 1-5 Units, Subcutaneous, TID AC, 1 Units at 11/28/24 1740 **AND** Fingerstick Glucose (POCT), , , TID AC, Arpan Coppola MD    loratadine (CLARITIN) tablet 10 mg, 10 mg, Oral, Daily, Arpan Coppola MD, 10 mg at 11/29/24 0910    metoprolol succinate (TOPROL-XL) 24 hr tablet 50 mg, 50 mg, Oral, Daily, Arpan Coppola MD, 50 mg at 11/29/24 0911    montelukast (SINGULAIR) tablet 10 mg, 10 mg, Oral, HS, Arpan Coppola MD, 10 mg at 11/28/24 2231    nitroglycerin (NITROSTAT) SL tablet 0.4 mg, 0.4 mg, Sublingual, Q5 Min PRN, Arpan Coppola MD    polyethylene glycol (MIRALAX) packet 17 g, 17 g, Oral, Daily, Arpan Coppola MD    potassium chloride (Klor-Con M20) CR tablet 40 mEq, 40 mEq, Oral, TID, Aster García PA-C, 40 mEq at 11/29/24 0910    pregabalin (LYRICA) capsule 75 mg, 75 mg, Oral, HS, Enedina Gray PA-C, 75 mg at 11/28/24 2231    sodium chloride (OCEAN) 0.65 % nasal spray 1 spray, 1 spray, Each Nare, Q1H PRN, Enedina Gray PA-C    spironolactone (ALDACTONE) tablet 25 mg, 25 mg, Oral, Daily, Henri Das MD, 25 mg at 11/29/24 0911    umeclidinium 62.5 mcg/actuation inhaler AEPB 1 puff, 1 puff, Inhalation, Daily, Arpan Coppola MD, 1 puff at 11/29/24 0912    Labs & Results:       Results from last 7 days   Lab Units 11/29/24  0922 11/26/24  0457 11/25/24  0451   WBC Thousand/uL 8.50 7.51 7.16   HEMOGLOBIN g/dL 12.4 10.9* 11.5*   HEMATOCRIT % 38.9 34.6* 36.6   PLATELETS Thousands/uL 287 231 220         Results from last 7 days   Lab Units 11/29/24  0922 11/28/24  0519 11/27/24  0939 11/24/24  0653 11/23/24  1508   POTASSIUM mmol/L 4.2 4.0 4.0   < > 3.9   CHLORIDE mmol/L 95* 98 97   < > 101   CO2 mmol/L 31 30 30   < > 21   BUN mg/dL 35* 34* 33*   < > 21   CREATININE mg/dL 2.16* 2.12* 2.11*   < > 1.27   CALCIUM mg/dL 8.2* 8.3* 8.2*   < > 7.8*   ALK PHOS U/L  --   --   --   --  108*   ALT U/L  --   --   --   --  5*   AST U/L  --   --   --   --  10*    < > = values in this interval not displayed.         Aster García PA-C

## 2024-12-02 ENCOUNTER — CLINICAL SUPPORT (OUTPATIENT)
Dept: CARDIOLOGY CLINIC | Facility: CLINIC | Age: 59
End: 2024-12-02
Payer: COMMERCIAL

## 2024-12-02 ENCOUNTER — TELEPHONE (OUTPATIENT)
Dept: CARDIOLOGY CLINIC | Facility: CLINIC | Age: 59
End: 2024-12-02

## 2024-12-02 DIAGNOSIS — I50.32 CHRONIC HEART FAILURE WITH PRESERVED EJECTION FRACTION (HCC): Primary | ICD-10-CM

## 2024-12-02 PROCEDURE — 93264 REM MNTR WRLS P-ART PRS SNR: CPT | Performed by: INTERNAL MEDICINE

## 2024-12-02 NOTE — TELEPHONE ENCOUNTER
Self-management/education and teach back:  Primary learner: Self  Following low sodium diet: Yes  Following fluid restriction: Yes  Hospital discharge weight: 314 lb 9.6 oz  Weighing daily:  Yes          Recording: Yes  1st home weight: 315 lb        Weight today: 315 lb  Monitoring symptoms: Yes  Any current symptoms: No  Knows when to call provider: Yes  Medication reviewed and taking all as prescribed: Yes  Knows name of diuretic: Yes  Escalation plan:   HF education reviewed/reinforced including low sodium diet, 64 oz fluid restriction, activity, symptoms of decompensation and when and who to call. Yes reviewed & reinforced.    Care Coordination:  Aware of cardiology follow up appointment: Yes, 12/4 24  Aware of PCP follow up appointment: Making appoint today.  Transportation: Drives self  Social Support:  Insurance/financial concerns:  Home health care:   Health literacy:  Engagement:  Personal Goal:  Additional comments:

## 2024-12-02 NOTE — UTILIZATION REVIEW
NOTIFICATION OF ADMISSION DISCHARGE   This is a Notification of Discharge from [facility]. Please be advised that this patient has been discharge from our facility. Below you will find the admission and discharge date and time including the patient’s disposition.   UTILIZATION REVIEW CONTACT:  Bayron Candelaria  Utilization   Network Utilization Review Department  Phone: [phone] x carefully listen to the prompts. All voicemails are confidential.  Email: [email or web address]     ADMISSION INFORMATION  PRESENTATION DATE: 11/23/2024  2:39 PM  OBERVATION ADMISSION DATE: N/A  INPATIENT ADMISSION DATE: 11/23/24  4:30 PM   DISCHARGE DATE: 11/29/2024  1:48 PM   DISPOSITION:Home/Self Care    Network Utilization Review Department  ATTENTION: Please call with any questions or concerns to [phone] and carefully listen to the prompts so that you are directed to the right person. All voicemails are confidential.   For Discharge needs, contact Care Management DC Support Team at [phone] opt. 2  Send all requests for admission clinical reviews, approved or denied determinations and any other requests to dedicated fax number below belonging to the campus where the patient is receiving treatment. List of dedicated fax numbers for the Facilities:  FACILITY NAME UR FAX NUMBER   ADMISSION DENIALS (Administrative/Medical Necessity) [phone]   DISCHARGE SUPPORT TEAM ([facility]) [phone]   PARENT CHILD HEALTH (Maternity/NICU/Pediatrics) [phone]   [facility] [phone]   [facility] [phone]   [facility] [phone]   [facility] [phone]   [facility] [phone]   [facility] [phone]   [facility] [phone]   [facility] [phone]    [facility] [phone]   [facility] [phone]   [facility] [phone]   [facility] [phone]

## 2024-12-04 ENCOUNTER — APPOINTMENT (OUTPATIENT)
Dept: LAB | Facility: CLINIC | Age: 59
End: 2024-12-04
Payer: COMMERCIAL

## 2024-12-04 ENCOUNTER — OFFICE VISIT (OUTPATIENT)
Dept: CARDIOLOGY CLINIC | Facility: CLINIC | Age: 59
End: 2024-12-04
Payer: COMMERCIAL

## 2024-12-04 VITALS
HEIGHT: 73 IN | WEIGHT: 315 LBS | DIASTOLIC BLOOD PRESSURE: 60 MMHG | SYSTOLIC BLOOD PRESSURE: 112 MMHG | HEART RATE: 80 BPM | BODY MASS INDEX: 41.75 KG/M2

## 2024-12-04 DIAGNOSIS — I48.0 PAROXYSMAL ATRIAL FIBRILLATION (HCC): Chronic | ICD-10-CM

## 2024-12-04 DIAGNOSIS — N18.31 STAGE 3A CHRONIC KIDNEY DISEASE (HCC): ICD-10-CM

## 2024-12-04 DIAGNOSIS — I50.33 ACUTE ON CHRONIC DIASTOLIC CONGESTIVE HEART FAILURE (HCC): ICD-10-CM

## 2024-12-04 DIAGNOSIS — I50.30 DIASTOLIC CONGESTIVE HEART FAILURE, UNSPECIFIED HF CHRONICITY (HCC): ICD-10-CM

## 2024-12-04 DIAGNOSIS — I50.33 ACUTE ON CHRONIC DIASTOLIC CONGESTIVE HEART FAILURE (HCC): Primary | ICD-10-CM

## 2024-12-04 LAB
ANION GAP SERPL CALCULATED.3IONS-SCNC: 12 MMOL/L (ref 4–13)
BNP SERPL-MCNC: 288 PG/ML (ref 0–100)
BUN SERPL-MCNC: 18 MG/DL (ref 5–25)
CALCIUM SERPL-MCNC: 8.1 MG/DL (ref 8.4–10.2)
CHLORIDE SERPL-SCNC: 99 MMOL/L (ref 96–108)
CO2 SERPL-SCNC: 24 MMOL/L (ref 21–32)
CREAT SERPL-MCNC: 1.26 MG/DL (ref 0.6–1.3)
EST. AVERAGE GLUCOSE BLD GHB EST-MCNC: 154 MG/DL
GFR SERPL CREATININE-BSD FRML MDRD: 62 ML/MIN/1.73SQ M
GLUCOSE SERPL-MCNC: 230 MG/DL (ref 65–140)
HBA1C MFR BLD: 7 %
POTASSIUM SERPL-SCNC: 4 MMOL/L (ref 3.5–5.3)
SODIUM SERPL-SCNC: 135 MMOL/L (ref 135–147)

## 2024-12-04 PROCEDURE — 83880 ASSAY OF NATRIURETIC PEPTIDE: CPT

## 2024-12-04 PROCEDURE — 99214 OFFICE O/P EST MOD 30 MIN: CPT

## 2024-12-04 NOTE — PATIENT INSTRUCTIONS
Please weigh yourself every day (after emptying your bladder) and keep a detailed log of weights.     Contact the Heart Failure program at [phone] if you gain 3+ lbs overnight or 5+ lbs in 5-7 days.    Limit daily sodium/salt intake to 2000 mg daily to prevent fluid retention.  Avoid canned foods, fast food/Chinese food, and processed meats (hot dogs, lunch meat, and sausage etc.). Caution with condiments.  Limit fluid intake to 2000 mL or 2 liters (about 60-65 ounces) daily.  Avoid electrolyte replacement drinks (such as Gatorade, Pedialyte, Propel, Liquid IV, etc.).  Bring complete list of medications and log of daily weights to your follow-up appointment.

## 2024-12-13 ENCOUNTER — TELEPHONE (OUTPATIENT)
Dept: CARDIOLOGY CLINIC | Facility: CLINIC | Age: 59
End: 2024-12-13

## 2024-12-13 DIAGNOSIS — I50.32 CHRONIC HEART FAILURE WITH PRESERVED EJECTION FRACTION (HCC): ICD-10-CM

## 2024-12-13 RX ORDER — METOPROLOL SUCCINATE 50 MG/1
50 TABLET, EXTENDED RELEASE ORAL DAILY
Qty: 90 TABLET | Refills: 1 | Status: SHIPPED | OUTPATIENT
Start: 2024-12-13

## 2024-12-13 NOTE — TELEPHONE ENCOUNTER
-- DO NOT REPLY / DO NOT REPLY ALL --  -- Message is from Engagement Center Operations (EC[facility]) --    General Patient Message  Refill requested for omeprazole (PrilOSEC) 20 MG capsule is still pending. Patient is experiencing a lot of stomach issues with the medications that she is taking. Please contact patient and advise when sent. Patient requesting more than one refill    Caller Information       Type Contact Phone/Fax    08/04/2022 02:37 PM CDT Phone (Incoming) Shanika Jarrell (Self) [phone] (M)        Alternative phone number: none    Can a detailed message be left? Yes    Message Turnaround:     Did the caller agree that this message can wait until the office reopens in the morning? YES - The Message Can Wait - Send a message to the provider's clinical support pool     [place]:    Please give this turnaround time to the caller:   \"This message will be sent to [state Provider's name]. The clinical team will fulfill your request as soon as they review your message.\"                 Called pt for update. Wt today 316 lbs. He said his dry wt is 315 lbs.  No symptoms other then the usual.  He said he feels pretty good.    Taking : bumex 6 mg Tid                Metolazone 5 mg 3 x's week on Tues, thurs, Sat.                 Jardiance 10 mg qd                 Aldactone 25 mg qd    PAD goal is 12    readings on  12/13- 16                                                  12/11- 17                                                  12/10- 13                                                  12/9-   15                                                   12/4-  13    Did you want to change anything or just keep monitoring?    Please advise

## 2024-12-17 ENCOUNTER — TELEPHONE (OUTPATIENT)
Dept: CARDIOLOGY CLINIC | Facility: CLINIC | Age: 59
End: 2024-12-17

## 2024-12-17 DIAGNOSIS — I50.32 CHRONIC HEART FAILURE WITH PRESERVED EJECTION FRACTION (HCC): Primary | ICD-10-CM

## 2024-12-17 PROBLEM — E87.8 ELECTROLYTE ABNORMALITY: Status: RESOLVED | Noted: 2024-10-05 | Resolved: 2024-12-17

## 2024-12-17 PROBLEM — E87.6 HYPOKALEMIA: Status: RESOLVED | Noted: 2021-11-14 | Resolved: 2024-12-17

## 2024-12-17 PROBLEM — I48.92 UNCONTROLLED ATRIAL FLUTTER (HCC): Status: RESOLVED | Noted: 2024-06-18 | Resolved: 2024-12-17

## 2024-12-17 PROBLEM — R09.81 NASAL CONGESTION: Status: RESOLVED | Noted: 2023-10-06 | Resolved: 2024-12-17

## 2024-12-17 PROBLEM — R94.39 ABNORMAL STRESS TEST: Status: RESOLVED | Noted: 2024-06-26 | Resolved: 2024-12-17

## 2024-12-17 PROBLEM — R60.0 BILATERAL LEG EDEMA: Status: RESOLVED | Noted: 2024-07-02 | Resolved: 2024-12-17

## 2024-12-17 NOTE — TELEPHONE ENCOUNTER
As per Dr Wilcox verbal orders; patient is to take Metolazone 5 mg today, tomorrow, & Thursday. On Friday 12/20, patient is to get lab work, a BMP.    Called patient & communicated Dr Wilcox orders to patient. Patient verbalized understanding.    Patient stated he did take a dose of Metolazone 5 mg today. Patient also stated he has a f/u Ov tomorrow morning with Aster García PA-C he will be keeping.

## 2024-12-17 NOTE — TELEPHONE ENCOUNTER
Patient called stating his weight is up.  Dry Wt - 315 lb  Today's Wt - 322 lb    Patient stated His feet & ankles are swollen with it being more difficult to get his shoes on. Stated he is a little more SOB.  Stated he last took Metolazone 5 mg on Saturday, 12/14. Next dose of Metolazone 5 mg is Thursday, 12/19.    Patient stated he has not changed his diet or fluid intake, adhering to restrictions as ordered.    Cardio Mem's Readings. Goal 12  12/17 - 16 12/16 - 15  12/15 - 13/15  12/14 - blank  12/13 - 16 12/12 - blank 12/11- 17  12/10 - 13 12/9 - 15  12/8, 12/7, 12/6. 12/5 - blank    Please advise.

## 2024-12-17 NOTE — TELEPHONE ENCOUNTER
I did not see response for Friday's message.  PAD goal is 12  Readings since Friday 12/17-16                                                                                                                                         12/16-15                                                                                                                                         12/15-15                                                                                                                                         12/13- 16

## 2024-12-18 ENCOUNTER — TELEPHONE (OUTPATIENT)
Dept: CARDIOLOGY CLINIC | Facility: CLINIC | Age: 59
End: 2024-12-18

## 2024-12-18 NOTE — TELEPHONE ENCOUNTER
F/U call to patient regarding weight gain, SOB, CHF symptoms. Also inquiring regarding patient missing appointment today with Aster sanchez PA-C.    Left a detailed message & c/b number with request for patient to return call to office.

## 2024-12-19 NOTE — TELEPHONE ENCOUNTER
F/U call to patient, who stated he is feeling much better today.  Today's Wt - 318 lb  Yesterday's Wt - 322 lb    Patient stated as ordered, he took Metolazone 5 mg on 12/17, 12/18, & today 12/19. Next dose of Metolazone will be on Saturday, as ordered.  CardioMem's Readings  12/18 - 17 12/19 - 12.

## 2024-12-23 ENCOUNTER — TELEPHONE (OUTPATIENT)
Dept: CARDIOLOGY CLINIC | Facility: CLINIC | Age: 59
End: 2024-12-23

## 2024-12-23 NOTE — TELEPHONE ENCOUNTER
Patient called to state his weight is up 5 pounds.  12/21 Wt - 316 lb  12/22 Wt - 318 lb  12/23 Wt - 320 lb    Not experiencing any added edema or increase in his baseline SOB.    Will take ordered Metolazone 5 mg tomorrow & stated he will go for ordered  blood work.    Please advise.

## 2024-12-24 NOTE — TELEPHONE ENCOUNTER
F/U call to patient who stated his weight remains the same. Wt - 320 lb.  Experiencing baseline SOB.  Denies edema to lower extremities.  Took the Metolazone 5 mg dose today as ordered.  Today's CardioMem's Reading - 19.    Did not go for blood work today. Will go on Thursday.     Please advise.

## 2024-12-24 NOTE — TELEPHONE ENCOUNTER
Returned call to patient & read him Dr Wilcox orders & instructions.    Patient verbalized understanding.  Will f/u with patient on 12/26.

## 2024-12-26 NOTE — TELEPHONE ENCOUNTER
Returned call to patient & read him Dr Nelson's orders & instructions.    Patient verbalized understanding.    Tomorrow & Saturday, he will take Metolazone 5 mg tomorrow with an extra potassium.

## 2024-12-26 NOTE — TELEPHONE ENCOUNTER
Patient of Dr Wilcox who is out of the office.  F/U call to patient who stated he took the Metolazone 2.5 mg yesterday & today in the AM as ordered by Dr Wilcox.    12/26, Today's Wt - 321 lb   12/23 Wt - 320 lb  12/21 Wt - 316 lb    Patient stated he is experiencing slight swelling in his feet & ankles with a little more difficulty getting his shoes on.  Dyspnea is at baseline for him.    Stated he will get his blood work done tomorrow morning.    Please advise.

## 2024-12-30 ENCOUNTER — TELEPHONE (OUTPATIENT)
Dept: CARDIOLOGY CLINIC | Facility: CLINIC | Age: 59
End: 2024-12-30

## 2024-12-30 NOTE — TELEPHONE ENCOUNTER
F/U call to patent. Patient stated he is not doing very well. Stated he is up 7 lbs from his dry weight.  Today's Wt - 322 lb    Stated his breathing is getting heavier. His feet & ankles are getting more swollen & harder to get shoes on.  Stated his lower back is hurting, a symptom of extra fluid that he has experienced in the past    Last dose of Metolazone 5 mg was on Saturday, next dose tomorrow.     Cardio Mem's Readings  12/20 - 14 12/21 - None  12/22 - 14 12/23 - 15  12/24 - 19  12/25 - 16  12/26 - 18  12/27 - 11  12/28 - None  12/29 - 14  12/30 - 15    Please advise.

## 2024-12-30 NOTE — TELEPHONE ENCOUNTER
Weight today 322.00 LBS, watch sodium, watching intake taking in about 2 liters daily, pt is weighing himself at the same time with same clothing.    Last took Metolazone on 12/26/2024. Looking to discuss his cardio mems/PAD    Please call [phone]

## 2024-12-30 NOTE — TELEPHONE ENCOUNTER
Returned call to patient & read him Dr Wilcox orders & instructions.    Patient verbalized understanding.  Patient stated he will go for blood work 1st thing tomorrow morning.    Advised patient will f/u tomorrow with weight & symptoms. Patient verbalized understanding.

## 2024-12-31 ENCOUNTER — APPOINTMENT (OUTPATIENT)
Dept: LAB | Facility: CLINIC | Age: 59
End: 2024-12-31
Payer: COMMERCIAL

## 2024-12-31 DIAGNOSIS — I50.32 CHRONIC HEART FAILURE WITH PRESERVED EJECTION FRACTION (HCC): ICD-10-CM

## 2024-12-31 LAB
ANION GAP SERPL CALCULATED.3IONS-SCNC: 13 MMOL/L (ref 4–13)
BUN SERPL-MCNC: 25 MG/DL (ref 5–25)
CALCIUM SERPL-MCNC: 8.9 MG/DL (ref 8.4–10.2)
CHLORIDE SERPL-SCNC: 96 MMOL/L (ref 96–108)
CO2 SERPL-SCNC: 27 MMOL/L (ref 21–32)
CREAT SERPL-MCNC: 1.31 MG/DL (ref 0.6–1.3)
GFR SERPL CREATININE-BSD FRML MDRD: 60 ML/MIN/1.73SQ M
GLUCOSE P FAST SERPL-MCNC: 193 MG/DL (ref 65–99)
POTASSIUM SERPL-SCNC: 4.4 MMOL/L (ref 3.5–5.3)
SODIUM SERPL-SCNC: 136 MMOL/L (ref 135–147)

## 2024-12-31 PROCEDURE — 80048 BASIC METABOLIC PNL TOTAL CA: CPT

## 2024-12-31 PROCEDURE — 36415 COLL VENOUS BLD VENIPUNCTURE: CPT

## 2025-01-01 DIAGNOSIS — I48.91 A-FIB (HCC): ICD-10-CM

## 2025-01-01 DIAGNOSIS — I48.91 ATRIAL FIBRILLATION, UNSPECIFIED TYPE (HCC): ICD-10-CM

## 2025-01-02 ENCOUNTER — TELEPHONE (OUTPATIENT)
Dept: CARDIOLOGY CLINIC | Facility: CLINIC | Age: 60
End: 2025-01-02

## 2025-01-02 ENCOUNTER — CLINICAL SUPPORT (OUTPATIENT)
Dept: CARDIOLOGY CLINIC | Facility: CLINIC | Age: 60
End: 2025-01-02
Payer: COMMERCIAL

## 2025-01-02 DIAGNOSIS — I50.32 CHRONIC HEART FAILURE WITH PRESERVED EJECTION FRACTION (HCC): Primary | ICD-10-CM

## 2025-01-02 PROCEDURE — 93264 REM MNTR WRLS P-ART PRS SNR: CPT | Performed by: INTERNAL MEDICINE

## 2025-01-02 NOTE — TELEPHONE ENCOUNTER
Called patient to communicate Dr Wilcox message.   Patient verbalized understanding & stated he feels good. Stated the swelling in his feet & ankles is down & so is his weight.  Today's Wt - 319 lb.    Cardio Mem's Readings  12/31 - 13 1/1 - None  1/2 - 12

## 2025-01-04 ENCOUNTER — APPOINTMENT (EMERGENCY)
Dept: RADIOLOGY | Facility: HOSPITAL | Age: 60
DRG: 291 | End: 2025-01-04
Payer: COMMERCIAL

## 2025-01-04 ENCOUNTER — HOSPITAL ENCOUNTER (INPATIENT)
Facility: HOSPITAL | Age: 60
LOS: 5 days | Discharge: HOME | DRG: 291 | End: 2025-01-09
Attending: EMERGENCY MEDICINE | Admitting: INTERNAL MEDICINE
Payer: COMMERCIAL

## 2025-01-04 ENCOUNTER — NURSE TRIAGE (OUTPATIENT)
Dept: OTHER | Facility: OTHER | Age: 60
End: 2025-01-04

## 2025-01-04 DIAGNOSIS — T50.2X5A DIURETIC-INDUCED HYPOKALEMIA: ICD-10-CM

## 2025-01-04 DIAGNOSIS — I50.9 CHF (CONGESTIVE HEART FAILURE) (HCC): Primary | ICD-10-CM

## 2025-01-04 DIAGNOSIS — I50.43 ACUTE ON CHRONIC COMBINED SYSTOLIC AND DIASTOLIC HEART FAILURE, NYHA CLASS 3 (HCC): ICD-10-CM

## 2025-01-04 DIAGNOSIS — E87.6 DIURETIC-INDUCED HYPOKALEMIA: ICD-10-CM

## 2025-01-04 LAB
2HR DELTA HS TROPONIN: -1 NG/L
ALBUMIN SERPL BCG-MCNC: 4.1 G/DL (ref 3.5–5)
ALP SERPL-CCNC: 124 U/L (ref 34–104)
ALT SERPL W P-5'-P-CCNC: 9 U/L (ref 7–52)
ANION GAP SERPL CALCULATED.3IONS-SCNC: 14 MMOL/L (ref 4–13)
AST SERPL W P-5'-P-CCNC: 15 U/L (ref 13–39)
BASOPHILS # BLD AUTO: 0.06 THOUSANDS/ΜL (ref 0–0.1)
BASOPHILS NFR BLD AUTO: 1 % (ref 0–1)
BILIRUB SERPL-MCNC: 0.43 MG/DL (ref 0.2–1)
BUN SERPL-MCNC: 25 MG/DL (ref 5–25)
CALCIUM SERPL-MCNC: 8.1 MG/DL (ref 8.4–10.2)
CARDIAC TROPONIN I PNL SERPL HS: 11 NG/L (ref ?–50)
CARDIAC TROPONIN I PNL SERPL HS: 12 NG/L (ref ?–50)
CHLORIDE SERPL-SCNC: 97 MMOL/L (ref 96–108)
CO2 SERPL-SCNC: 24 MMOL/L (ref 21–32)
CREAT SERPL-MCNC: 1.36 MG/DL (ref 0.6–1.3)
EOSINOPHIL # BLD AUTO: 0.46 THOUSAND/ΜL (ref 0–0.61)
EOSINOPHIL NFR BLD AUTO: 6 % (ref 0–6)
ERYTHROCYTE [DISTWIDTH] IN BLOOD BY AUTOMATED COUNT: 14.8 % (ref 11.6–15.1)
GFR SERPL CREATININE-BSD FRML MDRD: 57 ML/MIN/1.73SQ M
GLUCOSE SERPL-MCNC: 166 MG/DL (ref 65–140)
HCT VFR BLD AUTO: 40.5 % (ref 36.5–49.3)
HGB BLD-MCNC: 13.3 G/DL (ref 12–17)
IMM GRANULOCYTES # BLD AUTO: 0.07 THOUSAND/UL (ref 0–0.2)
IMM GRANULOCYTES NFR BLD AUTO: 1 % (ref 0–2)
LYMPHOCYTES # BLD AUTO: 1.15 THOUSANDS/ΜL (ref 0.6–4.47)
LYMPHOCYTES NFR BLD AUTO: 16 % (ref 14–44)
MCH RBC QN AUTO: 29.5 PG (ref 26.8–34.3)
MCHC RBC AUTO-ENTMCNC: 32.8 G/DL (ref 31.4–37.4)
MCV RBC AUTO: 90 FL (ref 82–98)
MONOCYTES # BLD AUTO: 0.61 THOUSAND/ΜL (ref 0.17–1.22)
MONOCYTES NFR BLD AUTO: 9 % (ref 4–12)
NEUTROPHILS # BLD AUTO: 4.83 THOUSANDS/ΜL (ref 1.85–7.62)
NEUTS SEG NFR BLD AUTO: 67 % (ref 43–75)
NRBC BLD AUTO-RTO: 0 /100 WBCS
PLATELET # BLD AUTO: 274 THOUSANDS/UL (ref 149–390)
PMV BLD AUTO: 9.5 FL (ref 8.9–12.7)
POTASSIUM SERPL-SCNC: 4.1 MMOL/L (ref 3.5–5.3)
PROT SERPL-MCNC: 7 G/DL (ref 6.4–8.4)
RBC # BLD AUTO: 4.51 MILLION/UL (ref 3.88–5.62)
SODIUM SERPL-SCNC: 135 MMOL/L (ref 135–147)
WBC # BLD AUTO: 7.18 THOUSAND/UL (ref 4.31–10.16)

## 2025-01-04 PROCEDURE — 80053 COMPREHEN METABOLIC PANEL: CPT

## 2025-01-04 PROCEDURE — 74176 CT ABD & PELVIS W/O CONTRAST: CPT

## 2025-01-04 PROCEDURE — 99285 EMERGENCY DEPT VISIT HI MDM: CPT

## 2025-01-04 PROCEDURE — 71046 X-RAY EXAM CHEST 2 VIEWS: CPT

## 2025-01-04 PROCEDURE — 85025 COMPLETE CBC W/AUTO DIFF WBC: CPT

## 2025-01-04 PROCEDURE — 99285 EMERGENCY DEPT VISIT HI MDM: CPT | Performed by: EMERGENCY MEDICINE

## 2025-01-04 PROCEDURE — 83880 ASSAY OF NATRIURETIC PEPTIDE: CPT

## 2025-01-04 PROCEDURE — 99223 1ST HOSP IP/OBS HIGH 75: CPT | Performed by: INTERNAL MEDICINE

## 2025-01-04 PROCEDURE — 96374 THER/PROPH/DIAG INJ IV PUSH: CPT

## 2025-01-04 PROCEDURE — 84484 ASSAY OF TROPONIN QUANT: CPT

## 2025-01-04 PROCEDURE — 93005 ELECTROCARDIOGRAM TRACING: CPT

## 2025-01-04 PROCEDURE — 36415 COLL VENOUS BLD VENIPUNCTURE: CPT

## 2025-01-04 RX ORDER — FLUTICASONE PROPIONATE 50 MCG
1 SPRAY, SUSPENSION (ML) NASAL 2 TIMES DAILY
Status: DISCONTINUED | OUTPATIENT
Start: 2025-01-04 | End: 2025-01-09 | Stop reason: HOSPADM

## 2025-01-04 RX ORDER — INSULIN LISPRO 100 [IU]/ML
1-5 INJECTION, SOLUTION INTRAVENOUS; SUBCUTANEOUS
Status: DISCONTINUED | OUTPATIENT
Start: 2025-01-05 | End: 2025-01-09 | Stop reason: HOSPADM

## 2025-01-04 RX ORDER — BUMETANIDE 0.25 MG/ML
6 INJECTION, SOLUTION INTRAMUSCULAR; INTRAVENOUS ONCE
Status: COMPLETED | OUTPATIENT
Start: 2025-01-04 | End: 2025-01-04

## 2025-01-04 RX ORDER — MONTELUKAST SODIUM 10 MG/1
10 TABLET ORAL
Status: DISCONTINUED | OUTPATIENT
Start: 2025-01-04 | End: 2025-01-09 | Stop reason: HOSPADM

## 2025-01-04 RX ORDER — LORATADINE 10 MG/1
10 TABLET ORAL DAILY
Status: DISCONTINUED | OUTPATIENT
Start: 2025-01-05 | End: 2025-01-09 | Stop reason: HOSPADM

## 2025-01-04 RX ORDER — ACETAMINOPHEN 325 MG/1
650 TABLET ORAL EVERY 6 HOURS PRN
Status: DISCONTINUED | OUTPATIENT
Start: 2025-01-04 | End: 2025-01-09 | Stop reason: HOSPADM

## 2025-01-04 RX ORDER — FAMOTIDINE 20 MG/1
20 TABLET, FILM COATED ORAL DAILY
Status: DISCONTINUED | OUTPATIENT
Start: 2025-01-05 | End: 2025-01-09 | Stop reason: HOSPADM

## 2025-01-04 RX ORDER — INSULIN LISPRO 100 [IU]/ML
1-5 INJECTION, SOLUTION INTRAVENOUS; SUBCUTANEOUS
Status: DISCONTINUED | OUTPATIENT
Start: 2025-01-04 | End: 2025-01-09 | Stop reason: HOSPADM

## 2025-01-04 RX ORDER — PREGABALIN 50 MG/1
50 CAPSULE ORAL DAILY
Status: DISCONTINUED | OUTPATIENT
Start: 2025-01-04 | End: 2025-01-09 | Stop reason: HOSPADM

## 2025-01-04 RX ORDER — SODIUM CHLORIDE 9 MG/ML
3 INJECTION INTRAVENOUS
Status: DISCONTINUED | OUTPATIENT
Start: 2025-01-04 | End: 2025-01-09 | Stop reason: HOSPADM

## 2025-01-04 RX ORDER — METOPROLOL SUCCINATE 50 MG/1
50 TABLET, EXTENDED RELEASE ORAL DAILY
Status: DISCONTINUED | OUTPATIENT
Start: 2025-01-05 | End: 2025-01-09 | Stop reason: HOSPADM

## 2025-01-04 RX ORDER — PREGABALIN 50 MG/1
50 CAPSULE ORAL DAILY
Status: DISCONTINUED | OUTPATIENT
Start: 2025-01-05 | End: 2025-01-04

## 2025-01-04 RX ORDER — BUMETANIDE 0.25 MG/ML
1 INJECTION INTRAMUSCULAR; INTRAVENOUS CONTINUOUS
Status: DISPENSED | OUTPATIENT
Start: 2025-01-04 | End: 2025-01-06

## 2025-01-04 RX ORDER — ATORVASTATIN CALCIUM 10 MG/1
10 TABLET, FILM COATED ORAL DAILY
Status: DISCONTINUED | OUTPATIENT
Start: 2025-01-05 | End: 2025-01-09 | Stop reason: HOSPADM

## 2025-01-04 RX ORDER — BUDESONIDE AND FORMOTEROL FUMARATE DIHYDRATE 160; 4.5 UG/1; UG/1
2 AEROSOL RESPIRATORY (INHALATION) 2 TIMES DAILY
Status: DISCONTINUED | OUTPATIENT
Start: 2025-01-04 | End: 2025-01-09 | Stop reason: HOSPADM

## 2025-01-04 RX ADMIN — BUDESONIDE AND FORMOTEROL FUMARATE DIHYDRATE 2 PUFF: 160; 4.5 AEROSOL RESPIRATORY (INHALATION) at 22:52

## 2025-01-04 RX ADMIN — BUMETANIDE 6 MG: 0.25 INJECTION INTRAMUSCULAR; INTRAVENOUS at 18:15

## 2025-01-04 RX ADMIN — MONTELUKAST 10 MG: 10 TABLET, FILM COATED ORAL at 22:51

## 2025-01-04 RX ADMIN — PREGABALIN 50 MG: 50 CAPSULE ORAL at 22:55

## 2025-01-04 RX ADMIN — Medication 1 MG/HR: at 22:50

## 2025-01-04 RX ADMIN — FLUTICASONE PROPIONATE 1 SPRAY: 50 SPRAY, METERED NASAL at 22:51

## 2025-01-04 NOTE — TELEPHONE ENCOUNTER
Reason for Disposition  • MODERATE difficulty breathing (e.g., speaks in phrases, SOB even at rest, pulse 100-120)    Protocols used: Heart Failure on Treatment Follow-up Call-Adult-

## 2025-01-04 NOTE — TELEPHONE ENCOUNTER
"Patient calling with seven pound weight gain and shortness of breath with bilateral leg swelling. Paged on call Provider per protocol and the Provider stated patient needs in person evaluation. Patient informed and stated he will call ems for transport to [place].     Answer Assessment - Initial Assessment Questions  1. MAIN CONCERN OR SYMPTOM: \"What is your main concern right now?\" \"What's the main symptom you're worried about?\" (e.g., breathing difficulty, ankle swelling, weight gain).        Bilateral leg and foot swelling, shortness of breath, 7 lb weight gain since yesterday    2. ONSET: \"When did the  issue  start?\"       Last night    3. BREATHING DIFFICULTY: \"Are you having any difficulty breathing?\" If Yes, ask: \"How bad is it?\"  (e.g., none, mild, moderate, severe)  \"Is this worse than usual for you?\"        Moderate    4. EDEMA - FOOT-LEG SWELLING: \"Do you have swelling of your ankles, feet or legs?\" If Yes, ask: \"How bad is the swelling?\" (e.g., localized; mild, moderate, severe)        Ankles to feet to legs, moderate now    5. WEIGHT - CURRENT: \"What is your weight today?\"      523  6. WEIGHT - TARGET RANGE: \"Do you try to keep your weight in a target (goal) range?\" If Yes, ask: \"What is that range?\"        515-518    7. WEIGHT - CHANGE: \"Have you gained (or lost) weight in the past 24 hours? Past week (7 days)?\" If Yes, ask:  \"How much weight?\"        Seven pounds    8. OTHER SYMPTOMS: \"Do you have any other symptoms?\" (e.g., depression, weakness or fatigue, abdomen bloating, hacky cough)          9. DIURETICS: \"Are you currently taking water pills?\" (e.g., furosemide [Lasix], hydrochlorothiazide [HCTZ], bumetanide [Bumex], metolazone [Zaroxolyn]) If Yes, ask: \"What medicine are you taking, and how often?\"  \"Any recent change in dose?\"         Yes hctz    11. HEART FAILURE HCP: \"Who treats your heart failure?\"  (e.g., cardiologist or heart specialist, heart failure clinic or center, primary care " "doctor)          [facility]    12. FLUID and SODIUM RESTRICTIONS: \"Have you been instructed to restrict your daily fluid intake or sodium (salt) intake?\" If Yes, ask: \"What are your daily limits?\"       2000mL and no salt at all    Protocols used: Heart Failure on Treatment Follow-up Call-Adult-    "

## 2025-01-05 PROBLEM — N18.32 STAGE 3B CHRONIC KIDNEY DISEASE (HCC): Status: ACTIVE | Noted: 2022-09-16

## 2025-01-05 PROBLEM — R17 SERUM TOTAL BILIRUBIN ELEVATED: Status: ACTIVE | Noted: 2025-01-05

## 2025-01-05 LAB
ALBUMIN SERPL BCG-MCNC: 4.1 G/DL (ref 3.5–5)
ALP SERPL-CCNC: 120 U/L (ref 34–104)
ALT SERPL W P-5'-P-CCNC: 10 U/L (ref 7–52)
ANION GAP SERPL CALCULATED.3IONS-SCNC: 14 MMOL/L (ref 4–13)
APTT PPP: 29 SECONDS (ref 23–34)
AST SERPL W P-5'-P-CCNC: 13 U/L (ref 13–39)
BASOPHILS # BLD AUTO: 0.08 THOUSANDS/ΜL (ref 0–0.1)
BASOPHILS NFR BLD AUTO: 1 % (ref 0–1)
BILIRUB SERPL-MCNC: 1.41 MG/DL (ref 0.2–1)
BNP SERPL-MCNC: 76 PG/ML (ref 0–100)
BUN SERPL-MCNC: 28 MG/DL (ref 5–25)
CALCIUM SERPL-MCNC: 8.2 MG/DL (ref 8.4–10.2)
CHLORIDE SERPL-SCNC: 94 MMOL/L (ref 96–108)
CO2 SERPL-SCNC: 26 MMOL/L (ref 21–32)
CREAT SERPL-MCNC: 1.69 MG/DL (ref 0.6–1.3)
EOSINOPHIL # BLD AUTO: 0.52 THOUSAND/ΜL (ref 0–0.61)
EOSINOPHIL NFR BLD AUTO: 6 % (ref 0–6)
ERYTHROCYTE [DISTWIDTH] IN BLOOD BY AUTOMATED COUNT: 14.9 % (ref 11.6–15.1)
GFR SERPL CREATININE-BSD FRML MDRD: 44 ML/MIN/1.73SQ M
GLUCOSE SERPL-MCNC: 154 MG/DL (ref 65–140)
GLUCOSE SERPL-MCNC: 159 MG/DL (ref 65–140)
GLUCOSE SERPL-MCNC: 164 MG/DL (ref 65–140)
GLUCOSE SERPL-MCNC: 169 MG/DL (ref 65–140)
GLUCOSE SERPL-MCNC: 174 MG/DL (ref 65–140)
GLUCOSE SERPL-MCNC: 187 MG/DL (ref 65–140)
GLUCOSE SERPL-MCNC: 195 MG/DL (ref 65–140)
HCT VFR BLD AUTO: 38.6 % (ref 36.5–49.3)
HGB BLD-MCNC: 12.8 G/DL (ref 12–17)
IMM GRANULOCYTES # BLD AUTO: 0.08 THOUSAND/UL (ref 0–0.2)
IMM GRANULOCYTES NFR BLD AUTO: 1 % (ref 0–2)
INR PPP: 0.96 (ref 0.85–1.19)
LYMPHOCYTES # BLD AUTO: 1.32 THOUSANDS/ΜL (ref 0.6–4.47)
LYMPHOCYTES NFR BLD AUTO: 14 % (ref 14–44)
MAGNESIUM SERPL-MCNC: 1.9 MG/DL (ref 1.9–2.7)
MCH RBC QN AUTO: 29.6 PG (ref 26.8–34.3)
MCHC RBC AUTO-ENTMCNC: 33.2 G/DL (ref 31.4–37.4)
MCV RBC AUTO: 89 FL (ref 82–98)
MONOCYTES # BLD AUTO: 0.83 THOUSAND/ΜL (ref 0.17–1.22)
MONOCYTES NFR BLD AUTO: 9 % (ref 4–12)
NEUTROPHILS # BLD AUTO: 6.37 THOUSANDS/ΜL (ref 1.85–7.62)
NEUTS SEG NFR BLD AUTO: 69 % (ref 43–75)
NRBC BLD AUTO-RTO: 0 /100 WBCS
PLATELET # BLD AUTO: 255 THOUSANDS/UL (ref 149–390)
PMV BLD AUTO: 9.6 FL (ref 8.9–12.7)
POTASSIUM SERPL-SCNC: 4 MMOL/L (ref 3.5–5.3)
PROT SERPL-MCNC: 7 G/DL (ref 6.4–8.4)
PROTHROMBIN TIME: 13.1 SECONDS (ref 12.3–15)
RBC # BLD AUTO: 4.32 MILLION/UL (ref 3.88–5.62)
SODIUM SERPL-SCNC: 134 MMOL/L (ref 135–147)
WBC # BLD AUTO: 9.2 THOUSAND/UL (ref 4.31–10.16)

## 2025-01-05 PROCEDURE — 85730 THROMBOPLASTIN TIME PARTIAL: CPT | Performed by: INTERNAL MEDICINE

## 2025-01-05 PROCEDURE — 83735 ASSAY OF MAGNESIUM: CPT | Performed by: INTERNAL MEDICINE

## 2025-01-05 PROCEDURE — 85610 PROTHROMBIN TIME: CPT | Performed by: INTERNAL MEDICINE

## 2025-01-05 PROCEDURE — 82948 REAGENT STRIP/BLOOD GLUCOSE: CPT

## 2025-01-05 PROCEDURE — 99232 SBSQ HOSP IP/OBS MODERATE 35: CPT | Performed by: NURSE PRACTITIONER

## 2025-01-05 PROCEDURE — 80053 COMPREHEN METABOLIC PANEL: CPT | Performed by: INTERNAL MEDICINE

## 2025-01-05 PROCEDURE — 36415 COLL VENOUS BLD VENIPUNCTURE: CPT | Performed by: INTERNAL MEDICINE

## 2025-01-05 PROCEDURE — 99222 1ST HOSP IP/OBS MODERATE 55: CPT | Performed by: INTERNAL MEDICINE

## 2025-01-05 PROCEDURE — 85025 COMPLETE CBC W/AUTO DIFF WBC: CPT | Performed by: INTERNAL MEDICINE

## 2025-01-05 RX ORDER — SPIRONOLACTONE 25 MG/1
25 TABLET ORAL DAILY
Status: DISCONTINUED | OUTPATIENT
Start: 2025-01-05 | End: 2025-01-09 | Stop reason: HOSPADM

## 2025-01-05 RX ORDER — SACCHAROMYCES BOULARDII 250 MG
250 CAPSULE ORAL 2 TIMES DAILY
Status: DISCONTINUED | OUTPATIENT
Start: 2025-01-05 | End: 2025-01-09 | Stop reason: HOSPADM

## 2025-01-05 RX ADMIN — INSULIN LISPRO 1 UNITS: 100 INJECTION, SOLUTION INTRAVENOUS; SUBCUTANEOUS at 00:35

## 2025-01-05 RX ADMIN — FAMOTIDINE 20 MG: 20 TABLET, FILM COATED ORAL at 08:19

## 2025-01-05 RX ADMIN — BUDESONIDE AND FORMOTEROL FUMARATE DIHYDRATE 2 PUFF: 160; 4.5 AEROSOL RESPIRATORY (INHALATION) at 17:13

## 2025-01-05 RX ADMIN — INSULIN LISPRO 1 UNITS: 100 INJECTION, SOLUTION INTRAVENOUS; SUBCUTANEOUS at 12:27

## 2025-01-05 RX ADMIN — INSULIN LISPRO 1 UNITS: 100 INJECTION, SOLUTION INTRAVENOUS; SUBCUTANEOUS at 21:27

## 2025-01-05 RX ADMIN — PREGABALIN 50 MG: 50 CAPSULE ORAL at 08:19

## 2025-01-05 RX ADMIN — Medication 1 MG/HR: at 22:22

## 2025-01-05 RX ADMIN — BUDESONIDE AND FORMOTEROL FUMARATE DIHYDRATE 2 PUFF: 160; 4.5 AEROSOL RESPIRATORY (INHALATION) at 15:11

## 2025-01-05 RX ADMIN — Medication 250 MG: at 17:13

## 2025-01-05 RX ADMIN — FLUTICASONE PROPIONATE 1 SPRAY: 50 SPRAY, METERED NASAL at 15:11

## 2025-01-05 RX ADMIN — UMECLIDINIUM 1 PUFF: 62.5 AEROSOL, POWDER ORAL at 12:27

## 2025-01-05 RX ADMIN — LORATADINE 10 MG: 10 TABLET ORAL at 08:18

## 2025-01-05 RX ADMIN — APIXABAN 5 MG: 5 TABLET, FILM COATED ORAL at 08:19

## 2025-01-05 RX ADMIN — APIXABAN 5 MG: 5 TABLET, FILM COATED ORAL at 17:13

## 2025-01-05 RX ADMIN — INSULIN LISPRO 1 UNITS: 100 INJECTION, SOLUTION INTRAVENOUS; SUBCUTANEOUS at 08:20

## 2025-01-05 RX ADMIN — INSULIN LISPRO 1 UNITS: 100 INJECTION, SOLUTION INTRAVENOUS; SUBCUTANEOUS at 17:14

## 2025-01-05 RX ADMIN — MONTELUKAST 10 MG: 10 TABLET, FILM COATED ORAL at 21:27

## 2025-01-05 RX ADMIN — FLUTICASONE PROPIONATE 1 SPRAY: 50 SPRAY, METERED NASAL at 17:13

## 2025-01-05 RX ADMIN — ATORVASTATIN CALCIUM 10 MG: 10 TABLET, FILM COATED ORAL at 08:19

## 2025-01-05 RX ADMIN — Medication 1 MG/HR: at 16:01

## 2025-01-05 RX ADMIN — CYANOCOBALAMIN TAB 500 MCG 1000 MCG: 500 TAB at 08:19

## 2025-01-05 RX ADMIN — METOPROLOL SUCCINATE 50 MG: 50 TABLET, EXTENDED RELEASE ORAL at 08:18

## 2025-01-05 RX ADMIN — Medication 1 MG/HR: at 07:07

## 2025-01-05 RX ADMIN — SPIRONOLACTONE 25 MG: 25 TABLET, FILM COATED ORAL at 17:17

## 2025-01-05 NOTE — CASE MANAGEMENT
Case Management Assessment & Discharge Planning Note    Patient name Mesfin Cano  Location /-01 MRN [id number]  : 1965 Date 2025       Current Admission Date: 2025  Current Admission Diagnosis:Acute on chronic combined systolic and diastolic heart failure, NYHA class 3 (HCC)   Patient Active Problem List    Diagnosis Date Noted Date Diagnosed    Serum total bilirubin elevated 2025     Acute on chronic combined systolic and diastolic heart failure, NYHA class 3 (HCC) 2025     Iron deficiency 10/07/2024     Onychomycosis 10/05/2024     Anemia 2024     Moderate persistent asthma without complication 2024     Status post cholecystectomy 2024     Uncontrolled type 2 diabetes mellitus with hyperglycemia (HCC) 2023     Eosinophilia 10/18/2023     Anemia due to chronic kidney disease 10/16/2023     Loose stools 2023     Chronic heart failure with preserved ejection fraction (HFpEF, >= 50%) ([facility]) 04/10/2023     Diabetic polyneuropathy associated with type 2 diabetes mellitus ([facility]) 2022     Stage 3b chronic kidney disease ([facility]) 2022     Presence of CardioMEMS HF system 2022     Paroxysmal atrial fibrillation (HCC) 2022     HNP (herniated nucleus pulposus), lumbar 2021     Foraminal stenosis of lumbar region 2021     VICKY (obstructive sleep apnea)      Hyperlipidemia 2019     Primary osteoarthritis of both knees 2018     Irritable bowel syndrome with diarrhea 2018     Primary hypertension 2018     Vitamin B12 deficiency 2016     Vitamin D deficiency 2016     Morbid obesity (HCC) 2016     Primary osteoarthritis of right knee 10/15/2013       LOS (days): 1  Geometric Mean LOS (GMLOS) (days):   Days to GMLOS:     OBJECTIVE:    Risk of Unplanned Readmission Score: 32.81         Current admission status: Inpatient       Preferred Pharmacy:   [id number] - [place], [place] - [address] [address]  Phone: [phone] Fax: [phone]    Primary Care Provider: Soo Chavez MD    Primary Insurance: BLUE CROSS  Secondary Insurance:     ASSESSMENT:  Active Health Care Proxies       Karina Cano Health Care Representative - Spouse   Primary Phone: [phone] (Home)  Mobile Phone: [phone]                    Readmission Root Cause  30 Day Readmission: No    Patient Information  Admitted from:: Home  Mental Status: Alert  During Assessment patient was accompanied by: Not accompanied during assessment  Assessment information provided by:: Patient  Primary Caregiver: Self  Support Systems: Spouse/significant other, Children  County of Residence: [place]  What city do you live in?: [place]  Home entry access options. Select all that apply.: Stairs  Number of steps to enter home.: 6  Type of Current Residence: 2 story home  Upon entering residence, is there a bedroom on the main floor (no further steps)?: No  A bedroom is located on the following floor levels of residence (select all that apply):: 2nd Floor  Upon entering residence, is there a bathroom on the main floor (no further steps)?: Yes  Living Arrangements: Lives w/ Spouse/significant other, Lives w/ Children    Activities of Daily Living Prior to Admission  Functional Status: Independent  Completes ADLs independently?: Yes  Ambulates independently?: Yes  Does patient use assisted devices?: No  Does patient currently own DME?: Yes  What DME does the patient currently own?: [employer]  Does patient have a history of Outpatient Therapy (PT/OT)?: No  Does the patient have a history of Short-Term Rehab?: No  Does patient currently have HHC?: No       Patient Information Continued  Does patient have prescription coverage?: Yes  Does patient receive dialysis treatments?: No  Does patient have a history of substance abuse?: No       Means of Transportation  Means of Transport to [facility]::  Drives Self      DISCHARGE DETAILS:    Discharge planning discussed with:: Patient   Contacts  Patient Contacts: mari Herbert  Relationship to Patient:: Other (Comment) (Self)    Other Referral/Resources/Interventions Provided:  Referral Comments: Pt admitted with acute on chronic combined systolic and distolic heart failure, CM following for DC planning.

## 2025-01-05 NOTE — ASSESSMENT & PLAN NOTE
Wt Readings from Last 3 Encounters:   01/04/25 (!) 147 kg (325 lb)   12/04/24 (!) 147 kg (323 lb 6.4 oz)   11/28/24 (!) 143 kg (314 lb 9.6 oz)   EF 55%, G2DD, no significant valvular abnormalities  Presents with 7 lb weight gain, worsening LE edema, abdominal distention and SOB  Outpatient diuretic regimen: Bumex 6 mg tid + Metolazone 3x weekly  BNP 76 on admission   Started on bumex drip at 1 mg/hr  Monitor on telemetry given bumex drip  HF consult- pending   Monitor I/Os, daily weights   Holding pta aldactone overnight while on bumex gtt

## 2025-01-05 NOTE — ASSESSMENT & PLAN NOTE
Lab Results   Component Value Date    HGBA1C 7.0 (H) 12/04/2024       Recent Labs     01/05/25  0026 01/05/25  0711 01/05/25  0815 01/05/25  1139   POCGLU 187* 164* 174* 195*       Blood Sugar Average: Last 72 hrs:  (P) 180  Outpatient regimen: sitagliptin, Jardiance (for CHF)- currently on hold   SSI with meals while hospitalized  Hypoglycemic protocol

## 2025-01-05 NOTE — PROGRESS NOTES
Progress Note - Hospitalist   Name: Mesfin Cano 59 y.o. male I MRN: [id number]  Unit/Bed#: -01 I Date of Admission: 1/4/2025   Date of Service: 1/5/2025 I Hospital Day: 1    Assessment & Plan  Acute on chronic combined systolic and diastolic heart failure, NYHA class 3 (HCC)  Wt Readings from Last 3 Encounters:   01/04/25 (!) 147 kg (325 lb)   12/04/24 (!) 147 kg (323 lb 6.4 oz)   11/28/24 (!) 143 kg (314 lb 9.6 oz)   EF 55%, G2DD, no significant valvular abnormalities  Presents with 7 lb weight gain, worsening LE edema, abdominal distention and SOB  Outpatient diuretic regimen: Bumex 6 mg tid + Metolazone 3x weekly  BNP 76 on admission   Started on bumex drip at 1 mg/hr  Monitor on telemetry given bumex drip  HF consult- pending   Monitor I/Os, daily weights   Holding pta aldactone overnight while on bumex gtt    Primary hypertension  Continue metoprolol 50mg daily, monitor BP while on bumex drip  VICKY (obstructive sleep apnea)  C/w CPAP  Paroxysmal atrial fibrillation (HCC)  Continue metoprolol XL 50 mg qd  Eliquis for AC  Uncontrolled type 2 diabetes mellitus with hyperglycemia (HCC)  Lab Results   Component Value Date    HGBA1C 7.0 (H) 12/04/2024       Recent Labs     01/05/25  0026 01/05/25  0711 01/05/25  0815 01/05/25  1139   POCGLU 187* 164* 174* 195*       Blood Sugar Average: Last 72 hrs:  (P) 180  Outpatient regimen: sitagliptin, Jardiance (for CHF)- currently on hold   SSI with meals while hospitalized  Hypoglycemic protocol    Morbid obesity (HCC)  BMI 42.88   Encourage diet and weight loss   Stage 3b chronic kidney disease (HCC)  Lab Results   Component Value Date    EGFR 44 01/05/2025    EGFR 57 01/04/2025    EGFR 60 12/31/2024    CREATININE 1.69 (H) 01/05/2025    CREATININE 1.36 (H) 01/04/2025    CREATININE 1.31 (H) 12/31/2024   Creat BL 1.5-2.0  Monitor creat while on bumex gtt  Avoid hypotension and nephrotoxic drugs   Check am cmp  Serum total bilirubin elevated  T bili level on admission  1.41- monitor   Initially reported abd pain on admission and loose stools  CT A/P negative   Elevation likely in the setting of CHF exacerbation   Repeat am cmp   Monitor stools- consider cdiff testing with recent admission if diarrhea occurs, RN to monitor stools     VTE Pharmacologic Prophylaxis: VTE Score: 4 Moderate Risk (Score 3-4) - Pharmacological DVT Prophylaxis Ordered: apixaban (Eliquis).    Mobility:   JH-HLM Achieved: 6: Walk 10 steps or more  JH-HLM Goal achieved. Continue to encourage appropriate mobility.    Patient Centered Rounds: I performed bedside rounds with nursing staff today.   Discussions with Specialists or Other Care Team Provider: d/w RN     Education and Discussions with Family / Patient: Patient declined call to .     Current Length of Stay: 1 day(s)  Current Patient Status: Inpatient   Certification Statement: The patient will continue to require additional inpatient hospital stay due to iv bumex gtt   Discharge Plan: Anticipate discharge in 48-72 hrs to home.    Code Status: Level 1 - Full Code    Subjective   Pt sitting oob in the chair. Reports that he has been experiencing leg swelling, sob with exertion, abdominal bloating with 7lb weight gain. He trialed metalazone at home with minimal effect on Saturday and this is why he presented to the ER. He notes he doesn't have much urine output considering he is on the bumex gtt. Reports some loose stools over the past 24 hrs but attributes this to not eating yesterday. He denies n/v/abd pain. Denies chest pain     Objective :  Temp:  [97.6 °F (36.4 °C)] 97.6 °F (36.4 °C)  HR:  [74-98] 84  BP: (123-162)/(67-97) 145/97  Resp:  [17-30] 20  SpO2:  [94 %-100 %] 96 %  O2 Device: None (Room air)    Body mass index is 42.88 kg/m².     Input and Output Summary (last 24 hours):     Intake/Output Summary (Last 24 hours) at 1/5/2025 1358  Last data filed at 1/5/2025 1300  Gross per 24 hour   Intake 360 ml   Output 300 ml   Net 60 ml        Physical Exam  Constitutional:       General: He is not in acute distress.     Appearance: He is morbidly obese. He is not ill-appearing.   Cardiovascular:      Rate and Rhythm: Normal rate and regular rhythm.      Heart sounds: Normal heart sounds. No murmur heard.  Pulmonary:      Effort: Pulmonary effort is normal. No respiratory distress.      Breath sounds: Normal breath sounds. No wheezing or rales.   Abdominal:      General: Bowel sounds are normal. There is no distension.      Palpations: Abdomen is soft.      Tenderness: There is no abdominal tenderness.   Musculoskeletal:         General: Swelling present. No tenderness.      Right lower leg: Edema present.      Left lower leg: Edema present.   Skin:     General: Skin is warm and dry.      Findings: No erythema or rash.   Neurological:      Mental Status: He is alert and oriented to person, place, and time. Mental status is at baseline.   Psychiatric:         Mood and Affect: Mood normal.           Lines/Drains:        Telemetry:  Telemetry Orders (From admission, onward)               24 Hour Telemetry Monitoring  Continuous x 24 Hours ([place])        Expiring   Question:  Reason for 24 Hour Telemetry  Answer:  Decompensated CHF- and any one of the following: continuous diuretic infusion or total diuretic dose >200 mg daily, associated electrolyte derangement (I.e. K < 3.0), inotropic drip (continuous infusion), hx of ventricular arrhythmia, or new EF < 35%                        Indication for Continued Telemetry Use: Acute CHF on >200 mg lasix/day or equivalent dose or with new reduced EF.                Lab Results: I have reviewed the following results:   Results from last 7 days   Lab Units 01/05/25  0420   WBC Thousand/uL 9.20   HEMOGLOBIN g/dL 12.8   HEMATOCRIT % 38.6   PLATELETS Thousands/uL 255   SEGS PCT % 69   LYMPHO PCT % 14   MONO PCT % 9   EOS PCT % 6     Results from last 7 days   Lab Units 01/05/25  0420   SODIUM mmol/L 134*    POTASSIUM mmol/L 4.0   CHLORIDE mmol/L 94*   CO2 mmol/L 26   BUN mg/dL 28*   CREATININE mg/dL 1.69*   ANION GAP mmol/L 14*   CALCIUM mg/dL 8.2*   ALBUMIN g/dL 4.1   TOTAL BILIRUBIN mg/dL 1.41*   ALK PHOS U/L 120*   ALT U/L 10   AST U/L 13   GLUCOSE RANDOM mg/dL 159*     Results from last 7 days   Lab Units 01/05/25  0420   INR  0.96     Results from last 7 days   Lab Units 01/05/25  1139 01/05/25  0815 01/05/25  0711 01/05/25  0026   POC GLUCOSE mg/dl 195* 174* 164* 187*               Recent Cultures (last 7 days):         Imaging Results Review: I reviewed radiology reports from this admission including: chest xray and CT abdomen/pelvis.  Other Study Results Review: No additional pertinent studies reviewed.    Last 24 Hours Medication List:     Current Facility-Administered Medications:     acetaminophen (TYLENOL) tablet 650 mg, Q6H PRN    apixaban (ELIQUIS) tablet 5 mg, BID    atorvastatin (LIPITOR) tablet 10 mg, Daily    budesonide-formoterol (SYMBICORT) 160-4.5 mcg/act inhaler 2 puff, BID    bumetanide (BUMEX) 12.5 mg infusion 50 mL, Continuous, Last Rate: 1 mg/hr (01/05/25 0707)    cyanocobalamin (VITAMIN B-12) tablet 1,000 mcg, Daily    famotidine (PEPCID) tablet 20 mg, Daily    fluticasone (FLONASE) 50 mcg/act nasal spray 1 spray, BID    insulin lispro (HumALOG/ADMELOG) 100 units/mL subcutaneous injection 1-5 Units, TID AC **AND** Fingerstick Glucose (POCT), TID AC    insulin lispro (HumALOG/ADMELOG) 100 units/mL subcutaneous injection 1-5 Units, HS    loratadine (CLARITIN) tablet 10 mg, Daily    metoprolol succinate (TOPROL-XL) 24 hr tablet 50 mg, Daily    montelukast (SINGULAIR) tablet 10 mg, HS    pregabalin (LYRICA) capsule 50 mg, Daily    Insert peripheral IV, Once **AND** sodium chloride (PF) 0.9 % injection 3 mL, Q1H PRN    umeclidinium 62.5 mcg/actuation inhaler AEPB 1 puff, Daily    Administrative Statements   Today, Patient Was Seen By: RODRIGUE Pickens      **Please Note: This note may have  been constructed using a voice recognition system.**

## 2025-01-05 NOTE — ASSESSMENT & PLAN NOTE
"Lab Results   Component Value Date    HGBA1C 7.0 (H) 12/04/2024       No results for input(s): \"POCGLU\" in the last 72 hours.    Blood Sugar Average: Last 72 hrs:  Outpatient regimen: sitagliptin, Jardiance (for CHF)  Holding oral hypoglycemic agents inpatinet  SSI with meals while hospitalized    "

## 2025-01-05 NOTE — PLAN OF CARE
Problem: Potential for Falls  Goal: Patient will remain free of falls  Description: INTERVENTIONS:  - Educate patient/family on patient safety including physical limitations  - Instruct patient to call for assistance with activity   - Consult OT/PT to assist with strengthening/mobility   - Keep Call bell within reach  - Keep bed low and locked with side rails adjusted as appropriate  - Keep care items and personal belongings within reach  - Initiate and maintain comfort rounds  - Make Fall Risk Sign visible to staff  - Offer Toileting every 4 Hours, in advance of need  - Initiate/Maintain 4alarm  - Obtain necessary fall risk management equipment: 4  - Apply yellow socks and bracelet for high fall risk patients  - Consider moving patient to room near nurses station  Outcome: Progressing

## 2025-01-05 NOTE — ASSESSMENT & PLAN NOTE
Lab Results   Component Value Date    EGFR 44 01/05/2025    EGFR 57 01/04/2025    EGFR 60 12/31/2024    CREATININE 1.69 (H) 01/05/2025    CREATININE 1.36 (H) 01/04/2025    CREATININE 1.31 (H) 12/31/2024   Creat BL 1.5-2.0  Monitor creat while on bumex gtt  Avoid hypotension and nephrotoxic drugs   Check am cmp

## 2025-01-05 NOTE — ASSESSMENT & PLAN NOTE
Wt Readings from Last 3 Encounters:   01/04/25 (!) 147 kg (325 lb)   12/04/24 (!) 147 kg (323 lb 6.4 oz)   11/28/24 (!) 143 kg (314 lb 9.6 oz)     EF 55%, G2DD, no significant valvular abnormalities  Presents with 7 lb weight gain, worsening LE edema, abdominal distention and SOB  Outpatient diuretic regimen: Bumex 6 mg tid + Metolazone 3x weekly  Start bumex drip at 1 mg/hr  Monitor on telemetry given bumex drip  HF consult  Monitor I/Os  Holding pta aldactone overnight while on bumex gtt

## 2025-01-05 NOTE — ASSESSMENT & PLAN NOTE
T bili level on admission 1.41- monitor   Initially reported abd pain on admission and loose stools  CT A/P negative   Elevation likely in the setting of CHF exacerbation   Repeat am cmp   Monitor stools- consider cdiff testing with recent admission if diarrhea occurs, RN to monitor stools

## 2025-01-05 NOTE — CONSULTS
Heart Failure/ Pulmonary Hypertension Consult Note - Mesfin Cano 59 y.o. male MRN: [id number]    Unit/Bed#: -01 Encounter: [id number]      Assessment/Plan:    # Acute on chronic HFpEF, Stage C, NYHA III   History of cardiomyopathy with mid range EF in 2020.  Reports shortness of breath with up to 7 lbs weight gain over the past couple of days with no improvement with metolazone dose  Clinically does not have significantly elevated JVP on exam and BNP lower than prior admissions  Last PAD 1/2 on CardioMems at goal      Weight:  318 lbs on discharge 8/4/24; 319 lbs on discharge 10/10/24;  315 lbs on discharge 11/28/24; 325 lbs on admission, bedscale  BNP: 165 2/4/24; 267 7/30/24; 263 8/7/24; 254 11/23/24; 76 1/4/24         Studies- personally reviewed by master Suarez 8/30/24: 1 day, 14 hours. Predominantly sinus rhythm with brief runs of SVT. Rare ventricular ectopy     Pharm Nuc Stress 6/25/24: Medium to large size partially reversible inferior/inferolateral perfusion defect concerning for inferior ischemia with inferolateral infarct v diaphragmatic attenuation artifact. Prone imaging could not be performed to further clarify this defect.   -similar to prior     RHC 10/11/23: CardioMEMS data correlates to RHC.   -140 during case  HR 74  O2 sat 97%  Hgb 10.5     RA 8  RV 30/8  PA 28/12, 17  PCWP 10-12 (multiple checks)  TPG 5  PA sat 59% (multiple checks)  Travis CO/CI: 6.6/2.3  TD CO/CI: 7.43/2.78     Simultaneous CardioMEMS data:  Mean: 26/10/15  End expiration on MEMS waveform: 28/10     LHC 9/6/22: No obstructive CAD     Pharm Nuc Stress 9/2/22: Abnormal study due to the presence of an inferior and inferolateral infarct without significant ischemia.     RHC 3/9/22:   RA 4   RV 35/4   PA 35/12/21   PCWP 10   PA sat 64%   Travis CO 5.56 L/min   Travis CI 2.2L/min/m2   PVR 1.97 SAGASTUME      TTE 03/25/2020: LVEF 40-45%. LVIDd 6.12 cm. Normal RV.    TTE 07/19/2021: LVEF 50%. LVIDd 6.13 cm. Grade 1 DD. Normal RV.  Trace MR and TR. Mildly dilated IVC.    TTE 11/12/2021: LVEF 55%. LVIDd 6.0 cm. Grade 1 DD. Normal RV. Trace TR.       Neurohormonal Blockade:   --Beta Blocker: metoprolol succinate 50 mg daily.    --ARNi / ACEi / ARB:   --Aldosterone Antagonist: spironolactone 25mg daily  --SGLT2 Inhibitor: Jardiance 10mg daily  --Home Diuretic: bumex 6mg TID with metolazone 5mg TIW     Sudden Cardiac Death Risk Reduction:   --LVEF >35%.       Electrical Resynchronization:   --Candidacy for BiV device: narrow QRS.        Advanced Therapies:   Will continue to monitor. LVEF recovered      # Atrial fibrillation/flutter  [id number] = 3 (HF, HTN, DM).   Diagnosed 02/2022.   Anticoagulation with Eliquis.    Rate control: BB as above.   Rhythm control: s/p DCCV     # Hypertension, above goal  Monitor with resumption of GDMT      # Hyperlipidemia   10/6/23:  HDL 55 LDL 86  Continues on atorvastatin 10 mg daily.        # Diabetes mellitus, type II     Non-insulin dependent.   Per primary      # Obstructive sleep apnea, Not currently using CPAP given machine recall. Needs repeat sleep study   # Chronic respiratory failure   # Asthma, severe persistent ,  follows with Dr. Noonan as outpatient.   # H/o gastric bypass (Samuel-en-Y)surgery    # History of tobacco abuse   # CKD 3b, baseline 1.5-2, recently 1.3, 1.69 today  # s/p CardioMems implant 3/9/22     Plan:  Monitor on current bumex drip at 1mg/hr  Resume spironolactone and Jardiance, creatinine overall within his baseline  Strict I/O and daily weights  Keep K>4 and Mg> 2    HPI:   59-year-old male with past medical history of chronic heart failure preserved ejection fraction, atrial fibrillation/flutter, hypertension, hyperlipidemia, DM type II who presents with worsening shortness of breath and weight gain of 7 pounds.  Admitted for decompensated heart failure.  Recently has been getting additional dose of metolazone as an outpatient due to fluid retention and elevated PAD readings  and CardioMEMS.  Last PAD reading 12 on 1/2/25.  He reports that over the last couple of days he noted uptrending weight and took dose of metolazone yesterday with no improvement leading up to ED presentation.  Reports taking medication as prescribed and no change in diet.    Past Medical History:   Diagnosis Date    Abnormal stress test 06/26/2024    Acute gastritis without hemorrhage     last assessed 3/24/17    Asthma     Atrial fibrillation ([facility])     Bilateral leg edema 02/19/2020    CHF (congestive heart failure) ([facility])     Coronary artery disease     Daytime sleepiness 07/17/2019    Diabetes mellitus ([facility])     Dyspnea on exertion 10/11/2021    Edema of both feet 01/23/2020    Electrolyte abnormality 10/05/2024    Gastric bypass status for obesity     Hyperlipidemia     Hypertension     Hypokalemia 11/14/2021    Impaired fasting glucose     last assessed 5/10/17    Knee sprain, bilateral 06/14/2018    Leg cramps 06/16/2022    Obesity     Uncontrolled atrial flutter ([facility]) 06/18/2024    Viral gastroenteritis     last assessed 11/4/16       Review of Systems   Constitutional:  Positive for unexpected weight change. Negative for chills and fever.   HENT:  Negative for ear pain and sore throat.    Eyes:  Negative for pain and visual disturbance.   Respiratory:  Positive for shortness of breath. Negative for cough.    Cardiovascular:  Negative for chest pain and palpitations.   Gastrointestinal:  Negative for abdominal pain and vomiting.   Genitourinary:  Negative for dysuria and hematuria.   Musculoskeletal:  Negative for arthralgias and back pain.   Skin:  Negative for color change and rash.   Neurological:  Negative for seizures and syncope.   All other systems reviewed and are negative.       Allergies   Allergen Reactions    Azithromycin Other (See Comments)     Shaky, uneasy feeling     Bactrim [Sulfamethoxazole-Trimethoprim] Other (See Comments)     shakiness     .    Current Facility-Administered Medications:      acetaminophen (TYLENOL) tablet 650 mg, 650 mg, Oral, Q6H PRN, Justino Prasad DO    apixaban (ELIQUIS) tablet 5 mg, 5 mg, Oral, BID, Justino Prasad DO, 5 mg at 01/05/25 0819    atorvastatin (LIPITOR) tablet 10 mg, 10 mg, Oral, Daily, Justino Prasad DO, 10 mg at 01/05/25 0819    budesonide-formoterol (SYMBICORT) 160-4.5 mcg/act inhaler 2 puff, 2 puff, Inhalation, BID, uJstino Prasad DO, 2 puff at 01/05/25 1511    bumetanide (BUMEX) 12.5 mg infusion 50 mL, 1 mg/hr, Intravenous, Continuous, Justino Prasad DO, Last Rate: 4 mL/hr at 01/05/25 0707, 1 mg/hr at 01/05/25 0707    cyanocobalamin (VITAMIN B-12) tablet 1,000 mcg, 1,000 mcg, Oral, Daily, Justino Prasad DO, 1,000 mcg at 01/05/25 0819    [START ON 1/6/2025] Empagliflozin (JARDIANCE) tablet 10 mg, 10 mg, Oral, Daily, Aminata Wilcox MD    famotidine (PEPCID) tablet 20 mg, 20 mg, Oral, Daily, Justino Prasad DO, 20 mg at 01/05/25 0819    fluticasone (FLONASE) 50 mcg/act nasal spray 1 spray, 1 spray, Nasal, BID, Justino Prasad DO, 1 spray at 01/05/25 1511    insulin lispro (HumALOG/ADMELOG) 100 units/mL subcutaneous injection 1-5 Units, 1-5 Units, Subcutaneous, TID AC, 1 Units at 01/05/25 1227 **AND** Fingerstick Glucose (POCT), , , TID AC, Justino Prasad DO    insulin lispro (HumALOG/ADMELOG) 100 units/mL subcutaneous injection 1-5 Units, 1-5 Units, Subcutaneous, HS, Justino Prasad DO, 1 Units at 01/05/25 0035    loratadine (CLARITIN) tablet 10 mg, 10 mg, Oral, Daily, Justino Prasad DO, 10 mg at 01/05/25 0818    metoprolol succinate (TOPROL-XL) 24 hr tablet 50 mg, 50 mg, Oral, Daily, Justino Prasad DO, 50 mg at 01/05/25 0818    montelukast (SINGULAIR) tablet 10 mg, 10 mg, Oral, HS, Justino Prasad DO, 10 mg at 01/04/25 2251    pregabalin (LYRICA) capsule 50 mg, 50 mg, Oral, Daily, Justino Prasad DO, 50 mg at 01/05/25 0819    saccharomyces boulardii (FLORASTOR) capsule 250 mg, 250 mg, Oral, BID, RODRIGUE Pickens    Insert peripheral IV, , , Once **AND** sodium  chloride (PF) 0.9 % injection 3 mL, 3 mL, Intravenous, Q1H PRN, Daquan Bender MD    spironolactone (ALDACTONE) tablet 25 mg, 25 mg, Oral, Daily, Aminata Wilcox MD    umeclidinium 62.5 mcg/actuation inhaler AEPB 1 puff, 1 puff, Inhalation, Daily, Justino Prasad, , 1 puff at 01/05/25 1227    Social History     Socioeconomic History    Marital status: /Civil Union     Spouse name: Not on file    Number of children: Not on file    Years of education: Not on file    Highest education level: Not on file   Occupational History    Not on file   Tobacco Use    Smoking status: Former     Current packs/day: 1.00     Average packs/day: 1 pack/day for 5.0 years (5.0 ttl pk-yrs)     Types: Pipe, Cigars, Cigarettes     Start date: 2016     Quit date: 1/1/2021     Passive exposure: Never    Smokeless tobacco: Former    Tobacco comments:     cigar once a day   Vaping Use    Vaping status: Never Used   Substance and Sexual Activity    Alcohol use: Yes     Alcohol/week: 2.0 standard drinks of alcohol     Types: 2 Shots of liquor per week     Comment: social    Drug use: No    Sexual activity: Yes     Partners: Female     Birth control/protection: None   Other Topics Concern    Not on file   Social History Narrative    Not on file     Social Drivers of Health     Financial Resource Strain: Not on file   Food Insecurity: No Food Insecurity (11/23/2024)    Nursing - Inadequate Food Risk Classification     Worried About Running Out of Food in the Last Year: Sometimes true     Ran Out of Food in the Last Year: Never true     Ran Out of Food in the Last Year: Never true   Recent Concern: Food Insecurity - Food Insecurity Present (10/5/2024)    Nursing - Inadequate Food Risk Classification     Worried About Running Out of Food in the Last Year: Sometimes true     Ran Out of Food in the Last Year: Never true     Ran Out of Food in the Last Year: Not on file   Transportation Needs: No Transportation Needs (11/23/2024)    Nursing -  Transportation Risk Classification     Lack of Transportation: Not on file     Lack of Transportation: No   Physical Activity: Not on file   Stress: Not on file   Social Connections: Not on file   Intimate Partner Violence: Unknown (2024)    Nursing IPS     Feels Physically and Emotionally Safe: Not on file     Physically Hurt by Someone: Not on file     Humiliated or Emotionally Abused by Someone: Not on file     Physically Hurt by Someone: No     Hurt or Threatened by Someone: No   Housing Stability: Unknown (2024)    Nursing: Inadequate Housing Risk Classification     Has Housing: Not on file     Worried About Losing Housing: Not on file     Unable to Get Utilities: Not on file     Unable to Pay for Housing in the Last Year: No     Has Housin       Family History   Problem Relation Age of Onset    Stroke Mother     Hypertension Father     Cancer Father     COPD Father        Physical Exam:    Vitals: Blood pressure 145/97, pulse 84, temperature 97.6 °F (36.4 °C), temperature source Tympanic, resp. rate 20, weight (!) 147 kg (325 lb), SpO2 96%., Body mass index is 42.88 kg/m².,   Wt Readings from Last 3 Encounters:   25 (!) 147 kg (325 lb)   24 (!) 147 kg (323 lb 6.4 oz)   24 (!) 143 kg (314 lb 9.6 oz)       Physical Exam  Constitutional:       General: He is not in acute distress.     Appearance: Normal appearance.   HENT:      Head: Normocephalic and atraumatic.      Mouth/Throat:      Mouth: Mucous membranes are moist.   Eyes:      General: No scleral icterus.     Extraocular Movements: Extraocular movements intact.   Neck:      Vascular: No JVD.   Cardiovascular:      Rate and Rhythm: Normal rate and regular rhythm.      Pulses: Normal pulses.      Heart sounds: S1 normal and S2 normal. No murmur heard.     No friction rub. No gallop.   Pulmonary:      Breath sounds: Normal breath sounds.   Abdominal:      General: There is no distension.      Palpations: Abdomen is soft.       Tenderness: There is no abdominal tenderness. There is no guarding or rebound.   Musculoskeletal:         General: Normal range of motion.      Cervical back: Neck supple.      Right lower leg: Edema present.      Left lower leg: Edema present.   Skin:     General: Skin is warm and dry.      Capillary Refill: Capillary refill takes less than 2 seconds.   Neurological:      General: No focal deficit present.      Mental Status: He is alert and oriented to person, place, and time.   Psychiatric:         Mood and Affect: Mood normal.         Labs & Results:    Lab Results   Component Value Date    WBC 9.20 01/05/2025    HGB 12.8 01/05/2025    HCT 38.6 01/05/2025    MCV 89 01/05/2025     01/05/2025     Lab Results   Component Value Date    SODIUM 134 (L) 01/05/2025    K 4.0 01/05/2025    CL 94 (L) 01/05/2025    CO2 26 01/05/2025    BUN 28 (H) 01/05/2025    CREATININE 1.69 (H) 01/05/2025    GLUC 159 (H) 01/05/2025    CALCIUM 8.2 (L) 01/05/2025     Lab Results   Component Value Date    NTBNP 697 (H) 04/10/2023      Lab Results   Component Value Date    CHOLESTEROL 170 10/06/2023    CHOLESTEROL 165 11/22/2022    CHOLESTEROL 235 (H) 09/19/2022     Lab Results   Component Value Date    HDL 55 10/06/2023    HDL 55 11/22/2022    HDL 64 09/19/2022     Lab Results   Component Value Date    TRIG 146 10/06/2023    TRIG 127 11/22/2022    TRIG 198 (H) 09/19/2022     Lab Results   Component Value Date    NONHDLC 110 11/22/2022    NONHDLC 171 09/19/2022    NONHDLC 118 09/05/2022       EKG personally reviewed by Monica Wilcox MD.       Thank you for the opportunity to participate in the care of this patient.    MONICA WILCOX MD  ADVANCED HEART FAILURE AND MECHANICAL CIRCULATORY SUPPORT  [facility]

## 2025-01-05 NOTE — RESPIRATORY THERAPY NOTE
RT Protocol Note  Mesfin Cano 59 y.o. male MRN: [id number]  Unit/Bed#: ED 26 Encounter: [id number]    Assessment    Principal Problem:    Acute on chronic combined systolic and diastolic heart failure, NYHA class 3 ([facility])  Active Problems:    Primary hypertension    VICKY (obstructive sleep apnea)    Paroxysmal atrial fibrillation (HCC)    Uncontrolled type 2 diabetes mellitus with hyperglycemia ([facility])      Home Pulmonary Medications:  Symbicort, spiriva         Past Medical History:   Diagnosis Date    Abnormal stress test 06/26/2024    Acute gastritis without hemorrhage     last assessed 3/24/17    Asthma     Atrial fibrillation ([facility])     Bilateral leg edema 02/19/2020    CHF (congestive heart failure) ([facility])     Coronary artery disease     Daytime sleepiness 07/17/2019    Diabetes mellitus ([facility])     Dyspnea on exertion 10/11/2021    Edema of both feet 01/23/2020    Electrolyte abnormality 10/05/2024    Gastric bypass status for obesity     Hyperlipidemia     Hypertension     Hypokalemia 11/14/2021    Impaired fasting glucose     last assessed 5/10/17    Knee sprain, bilateral 06/14/2018    Leg cramps 06/16/2022    Obesity     Uncontrolled atrial flutter ([facility]) 06/18/2024    Viral gastroenteritis     last assessed 11/4/16     Social History     Socioeconomic History    Marital status: /Civil Union     Spouse name: None    Number of children: None    Years of education: None    Highest education level: None   Occupational History    None   Tobacco Use    Smoking status: Former     Current packs/day: 1.00     Average packs/day: 1 pack/day for 5.0 years (5.0 ttl pk-yrs)     Types: Pipe, Cigars, Cigarettes     Start date: 2016     Quit date: 1/1/2021     Passive exposure: Never    Smokeless tobacco: Former    Tobacco comments:     cigar once a day   Vaping Use    Vaping status: Never Used   Substance and Sexual Activity    Alcohol use: Yes     Alcohol/week: 2.0 standard drinks of alcohol     Types: 2 Shots of liquor  per week     Comment: social    Drug use: No    Sexual activity: Yes     Partners: Female     Birth control/protection: None   Other Topics Concern    None   Social History Narrative    None     Social Drivers of Health     Financial Resource Strain: Not on file   Food Insecurity: No Food Insecurity (2024)    Nursing - Inadequate Food Risk Classification     Worried About Running Out of Food in the Last Year: Sometimes true     Ran Out of Food in the Last Year: Never true     Ran Out of Food in the Last Year: Never true   Recent Concern: Food Insecurity - Food Insecurity Present (10/5/2024)    Nursing - Inadequate Food Risk Classification     Worried About Running Out of Food in the Last Year: Sometimes true     Ran Out of Food in the Last Year: Never true     Ran Out of Food in the Last Year: Not on file   Transportation Needs: No Transportation Needs (2024)    Nursing - Transportation Risk Classification     Lack of Transportation: Not on file     Lack of Transportation: No   Physical Activity: Not on file   Stress: Not on file   Social Connections: Not on file   Intimate Partner Violence: Unknown (2024)    Nursing IPS     Feels Physically and Emotionally Safe: Not on file     Physically Hurt by Someone: Not on file     Humiliated or Emotionally Abused by Someone: Not on file     Physically Hurt by Someone: No     Hurt or Threatened by Someone: No   Housing Stability: Unknown (2024)    Nursing: Inadequate Housing Risk Classification     Has Housing: Not on file     Worried About Losing Housing: Not on file     Unable to Get Utilities: Not on file     Unable to Pay for Housing in the Last Year: No     Has Housin       Subjective         Objective    Physical Exam:   Assessment Type: Assess only  General Appearance: Awake  Respiratory Pattern: Dyspnea with exertion  Chest Assessment: Chest expansion symmetrical  Bilateral Breath Sounds: Rales    Vitals:  Blood pressure 136/71, pulse 84,  temperature 97.6 °F (36.4 °C), temperature source Tympanic, resp. rate (P) 20, weight (!) 147 kg (325 lb), SpO2 95%.          Imaging and other studies: Results Review Statement: No pertinent imaging studies reviewed.          Plan    Respiratory Plan: Home Bronchodilator Patient pathway, Discontinue Protocol        Resp Comments: (P) pt assessed per resp protocol. pt presented to ED with increased weight gain, bilateral lower extremity edema, abdominal distention and shortness of breath worsening over the last several days. Pt has history of chf and VICKY. Pt takes symbicort and spiriva at home. Pt ordered as such. Will d/c resp protocol at this time.

## 2025-01-05 NOTE — H&P
"H&P - Hospitalist   Name: Mesfin Cano 59 y.o. male I MRN: [id number]  Unit/Bed#: ED 26 I Date of Admission: 1/4/2025   Date of Service: 1/4/2025 I Hospital Day: 0     Assessment & Plan  Acute on chronic combined systolic and diastolic heart failure, NYHA class 3 (HCC)  Wt Readings from Last 3 Encounters:   01/04/25 (!) 147 kg (325 lb)   12/04/24 (!) 147 kg (323 lb 6.4 oz)   11/28/24 (!) 143 kg (314 lb 9.6 oz)     EF 55%, G2DD, no significant valvular abnormalities  Presents with 7 lb weight gain, worsening LE edema, abdominal distention and SOB  Outpatient diuretic regimen: Bumex 6 mg tid + Metolazone 3x weekly  Start bumex drip at 1 mg/hr  Monitor on telemetry given bumex drip  HF consult  Monitor I/Os  Holding pta aldactone overnight while on bumex gtt    Primary hypertension  Slightly above goal  Continue metoprolol, monitor BP while on bumex drip  VICKY (obstructive sleep apnea)  CPAP while inpatient if agreeable  Paroxysmal atrial fibrillation (HCC)  Continue metoprolol XL 50 mg qd  Eliquis for AC  Uncontrolled type 2 diabetes mellitus with hyperglycemia (HCC)  Lab Results   Component Value Date    HGBA1C 7.0 (H) 12/04/2024       No results for input(s): \"POCGLU\" in the last 72 hours.    Blood Sugar Average: Last 72 hrs:  Outpatient regimen: sitagliptin, Jardiance (for CHF)  Holding oral hypoglycemic agents inpatinet  SSI with meals while hospitalized        VTE Pharmacologic Prophylaxis: VTE Score: 4 Moderate Risk (Score 3-4) - Pharmacological DVT Prophylaxis Ordered: apixaban (Eliquis).  Code Status: Level 1 - Full Code       Anticipated Length of Stay: Patient will be admitted on an inpatient basis with an anticipated length of stay of greater than 2 midnights secondary to CHF exacerbation.    History of Present Illness   Chief Complaint: Shortness of breath, lower extremity edema, weight gain, abdominal distention    Mesfin Cano is a 59 y.o. male with a PMH of CHF, VICKY, atrial fibrillation, diabetes who " presents with with increased weight gain, bilateral lower extremity edema, abdominal distention and shortness of breath worsening over the last several days.  Patient reports that the symptoms feel similar to his hospitalization back in November but slightly less severe and reports that he knew he was starting to accumulate fluid.  Patient reports 7 to 10 pound weight gain over the last several days.  Patient denies any chest pain.    Review of Systems   Respiratory:  Positive for shortness of breath.    Cardiovascular:  Positive for leg swelling. Negative for chest pain.   All other systems reviewed and are negative.      Historical Information   Past Medical History:   Diagnosis Date    Abnormal stress test 06/26/2024    Acute gastritis without hemorrhage     last assessed 3/24/17    Asthma     Atrial fibrillation (HCC)     Bilateral leg edema 02/19/2020    CHF (congestive heart failure) ([facility])     Coronary artery disease     Daytime sleepiness 07/17/2019    Diabetes mellitus ([facility])     Dyspnea on exertion 10/11/2021    Edema of both feet 01/23/2020    Electrolyte abnormality 10/05/2024    Gastric bypass status for obesity     Hyperlipidemia     Hypertension     Hypokalemia 11/14/2021    Impaired fasting glucose     last assessed 5/10/17    Knee sprain, bilateral 06/14/2018    Leg cramps 06/16/2022    Obesity     Uncontrolled atrial flutter (HCC) 06/18/2024    Viral gastroenteritis     last assessed 11/4/16     Past Surgical History:   Procedure Laterality Date    CARDIAC CATHETERIZATION N/A 3/9/2022    Procedure: CARDIAC RHC W/ PRESSURE SENSOR;  Surgeon: Aminata Wilcox MD;  Location: [facility];  Service: Cardiology    CARDIAC CATHETERIZATION N/A 9/6/2022    Procedure: Cardiac catheterization;  Surgeon: Yolanda Del Rosario DO;  Location: [facility];  Service: Cardiology    CARDIAC CATHETERIZATION N/A 9/6/2022    Procedure: Cardiac Coronary Angiogram;  Surgeon: Yolanda Del Rosario DO;  Location:  [facility];  Service: Cardiology    CARDIAC CATHETERIZATION N/A 10/11/2023    Procedure: Cardiac RHC;  Surgeon: Yoel Darden MD;  Location: [facility];  Service: Cardiology    CARPAL TUNNEL RELEASE      bilateral    CHOLECYSTECTOMY LAPAROSCOPIC N/A 2/7/2024    Procedure: CHOLECYSTECTOMY LAPAROSCOPIC;  Surgeon: Nena Ferguson MD;  Location: [facility];  Service: General    GASTRIC BYPASS  2004    with han en y    HERNIA REPAIR      ventral inscisional    IR BIOPSY BONE MARROW  12/14/2023    LAPAROSCOPIC TRANSGASTRIC ACCESS FOR ERCP N/A 2/7/2024    Procedure: LAPAROSCOPIC TRANSGASTRIC ACCESS FOR ERCP;  Surgeon: Nena Ferguson MD;  Location: [facility];  Service: General    LAPAROSCOPIC TRANSGASTRIC ACCESS FOR ERCP N/A 2/7/2024    Procedure: ERCP, sphincterotomy, stone extraction;  Surgeon: Rey Ferguson MD;  Location: [facility];  Service: Gastroenterology    TONSILLECTOMY       Social History     Tobacco Use    Smoking status: Former     Current packs/day: 1.00     Average packs/day: 1 pack/day for 5.0 years (5.0 ttl pk-yrs)     Types: Pipe, Cigars, Cigarettes     Start date: 2016     Quit date: 1/1/2021     Passive exposure: Never    Smokeless tobacco: Former    Tobacco comments:     cigar once a day   Vaping Use    Vaping status: Never Used   Substance and Sexual Activity    Alcohol use: Yes     Alcohol/week: 2.0 standard drinks of alcohol     Types: 2 Shots of liquor per week     Comment: social    Drug use: No    Sexual activity: Yes     Partners: Female     Birth control/protection: None     E-Cigarette/Vaping    E-Cigarette Use Never User      E-Cigarette/Vaping Substances    Nicotine No     THC No     CBD No     Flavoring No     Other No     Unknown No      Family history non-contributory  Social History:  Marital Status: /Civil Union       Meds/Allergies   I have reviewed home medications using recent Epic encounter.  Prior to Admission medications    Medication Sig Start  Date End Date Taking? Authorizing Provider   Accu-Chek FastClix Lancets MISC Test blood sugar twice daily  Patient taking differently: Takes blood sugar three times a week provider aware 11/16/21   Soo Chavez MD   acetaminophen (TYLENOL) 325 mg tablet Take 2 tablets (650 mg total) by mouth every 6 (six) hours as needed for mild pain, headaches or fever 2/12/24   Paulino Brown MD   albuterol (PROVENTIL HFA,VENTOLIN HFA) 90 mcg/act inhaler INHALE 2 PUFFS EVERY 4 HOURS AS NEEDED FOR WHEEZING OR SHORTNESS OF BREATH 9/11/24   Soo Chavez MD   aluminum-magnesium hydroxide 200-200 MG/5ML suspension Take 5 mL by mouth every 6 (six) hours as needed for heartburn 6/27/24   Earl Sarkar MD   apixaban (Eliquis) 5 mg Take 1 tablet (5 mg total) by mouth 2 (two) times a day 1/2/25   Aminata Wilcox MD   atorvastatin (LIPITOR) 10 mg tablet TAKE 1 TABLET BY MOUTH EVERY DAY 12/18/23   Aminata Wilcox MD   Blood Glucose Monitoring Suppl (Accu-Chek Guide) w/Device KIT Use 2 (two) times a day Test blood sugar twice daily 8/5/21   Soo Chavez MD   budesonide-formoterol (Symbicort) 160-4.5 mcg/act inhaler Inhale 2 puffs 2 (two) times a day Rinse mouth after use. 1/11/24   Pedro Finn DO   bumetanide (BUMEX) 2 mg tablet Take 3 tablets (6 mg total) by mouth 3 (three) times a day 10/10/24 11/9/24  Travis Champion MD   cyanocobalamin (VITAMIN B-12) 1000 MCG tablet Take 1 tablet (1,000 mcg total) by mouth daily 10/10/24   Travis Champion MD   Empagliflozin (JARDIANCE) 10 MG TABS tablet Take 1 tablet (10 mg total) by mouth daily 5/26/23   Aminata Wilcox MD   famotidine (PEPCID) 20 mg tablet Take 1 tablet (20 mg total) by mouth if needed for heartburn As needed twice a day 10/10/24   Travis Champion MD   fluticasone (FLONASE) 50 mcg/act nasal spray 1 spray into each nostril 2 (two) times a day 10/12/23   RODRIGUE Pickens   loratadine (CLARITIN) 10 mg tablet Take 1 tablet (10 mg total) by mouth  if needed for allergies As needed daily 10/10/24   Travis Champion MD   metolazone (ZAROXOLYN) 2.5 mg tablet Take 2 tablets (5 mg total) by mouth 3 (three) times a week Take 20-30 minutes before Bumex dose and with additional potassium 11/29/24   RODRIGUE Wagner   metoprolol succinate (TOPROL-XL) 50 mg 24 hr tablet TAKE 1 TABLET BY MOUTH EVERY DAY 12/13/24   Aminata Wilcox MD   montelukast (SINGULAIR) 10 mg tablet Take 1 tablet (10 mg total) by mouth daily at bedtime 2/3/22 10/29/24  Soo Chavez MD   nitroglycerin (NITROSTAT) 0.4 mg SL tablet Place 1 tablet (0.4 mg total) under the tongue every 5 (five) minutes as needed for chest pain for up to 50 doses 6/27/24   Earl Sarkar MD   potassium chloride (Klor-Con M20) 20 mEq tablet Take 2 tablets (40 mEq total) by mouth 3 (three) times a day 10/10/24   Travis Champion MD   pregabalin (LYRICA) 50 mg capsule Take 1 caps. at bedtime 8/19/24   Soo Chavez MD   sitaGLIPtin (Januvia) 100 mg tablet TAKE 1/2 TABLET BY MOUTH EVERY DAY 5/15/24   Soo Chavez MD   sodium chloride (OCEAN) 0.65 % nasal spray 1 spray into each nostril every hour as needed for congestion 11/29/24   RODRIGUE Wagner   spironolactone (ALDACTONE) 25 mg tablet Take 1 tablet (25 mg total) by mouth daily 11/30/24   RODRIGUE Wagner   tiotropium (Spiriva Respimat) 1.25 MCG/ACT AERS inhaler Inhale 2 puffs daily 4/3/24 8/8/24  Soo Chavez MD     Allergies   Allergen Reactions    Azithromycin Other (See Comments)     Shaky, uneasy feeling     Bactrim [Sulfamethoxazole-Trimethoprim] Other (See Comments)     shakiness       Objective :  Temp:  [97.6 °F (36.4 °C)] 97.6 °F (36.4 °C)  HR:  [84] 84  BP: (136)/(68-71) 136/71  Resp:  [20-30] 20  SpO2:  [95 %-100 %] 95 %  O2 Device: None (Room air)    Physical Exam  Vitals reviewed.   Constitutional:       Appearance: He is obese.   HENT:      Right Ear: External ear normal.      Left Ear: External ear normal.      Nose: No  congestion.      Mouth/Throat:      Pharynx: Oropharynx is clear.   Eyes:      Extraocular Movements: Extraocular movements intact.   Cardiovascular:      Rate and Rhythm: Normal rate.   Pulmonary:      Effort: No respiratory distress.   Abdominal:      General: There is distension.   Musculoskeletal:      Right lower leg: Edema present.      Left lower leg: Edema present.   Skin:     Capillary Refill: Capillary refill takes less than 2 seconds.   Neurological:      General: No focal deficit present.      Mental Status: He is alert. Mental status is at baseline.                  Lab Results: I have reviewed the following results:  Results from last 7 days   Lab Units 01/04/25  1544   WBC Thousand/uL 7.18   HEMOGLOBIN g/dL 13.3   HEMATOCRIT % 40.5   PLATELETS Thousands/uL 274   SEGS PCT % 67   LYMPHO PCT % 16   MONO PCT % 9   EOS PCT % 6     Results from last 7 days   Lab Units 01/04/25  1544   SODIUM mmol/L 135   POTASSIUM mmol/L 4.1   CHLORIDE mmol/L 97   CO2 mmol/L 24   BUN mg/dL 25   CREATININE mg/dL 1.36*   ANION GAP mmol/L 14*   CALCIUM mg/dL 8.1*   ALBUMIN g/dL 4.1   TOTAL BILIRUBIN mg/dL 0.43   ALK PHOS U/L 124*   ALT U/L 9   AST U/L 15   GLUCOSE RANDOM mg/dL 166*             Lab Results   Component Value Date    HGBA1C 7.0 (H) 12/04/2024    HGBA1C 7.3 (H) 11/23/2024    HGBA1C 8.3 (H) 06/19/2024           Imaging Results Review: I reviewed radiology reports from this admission including: chest xray.  Other Study Results Review: EKG was reviewed.     Administrative Statements   I have spent a total time of 75 minutes in caring for this patient on the day of the visit/encounter including Diagnostic results, Prognosis, and Risks and benefits of tx options.    ** Please Note: This note has been constructed using a voice recognition system. **

## 2025-01-05 NOTE — ED ATTENDING ATTESTATION
1/4/2025  I, Jerry Curran MD, saw and evaluated the patient. I have discussed the patient with the resident/non-physician practitioner and agree with the resident's/non-physician practitioner's findings, Plan of Care, and MDM as documented in the resident's/non-physician practitioner's note, except where noted. All available labs and Radiology studies were reviewed.  I was present for key portions of any procedure(s) performed by the resident/non-physician practitioner and I was immediately available to provide assistance.       At this point I agree with the current assessment done in the Emergency Department.  I have conducted an independent evaluation of this patient a history and physical is as follows:    59-year-old man with history of congestive heart failure presenting with increasing exertional shortness of breath, orthopnea, 7 pounds weight gain in the last week.  No chest pain.  On exam he is awake and alert no acute distress.  Heart regular rate and rhythm, no murmurs rubs or gallops.  Bibasilar rales present normal work of breathing.  Skin warm and dry.  There is bilateral lower extremity edema.  Will get EKG, labs, chest x-ray, start diuretics, admit.    ED Course         Critical Care Time  Procedures

## 2025-01-06 LAB
ALBUMIN SERPL BCG-MCNC: 3.8 G/DL (ref 3.5–5)
ALP SERPL-CCNC: 113 U/L (ref 34–104)
ALT SERPL W P-5'-P-CCNC: 7 U/L (ref 7–52)
ANION GAP SERPL CALCULATED.3IONS-SCNC: 13 MMOL/L (ref 4–13)
AST SERPL W P-5'-P-CCNC: 11 U/L (ref 13–39)
ATRIAL RATE: 76 BPM
ATRIAL RATE: 77 BPM
BILIRUB SERPL-MCNC: 1.04 MG/DL (ref 0.2–1)
BUN SERPL-MCNC: 31 MG/DL (ref 5–25)
CALCIUM SERPL-MCNC: 8.2 MG/DL (ref 8.4–10.2)
CHLORIDE SERPL-SCNC: 94 MMOL/L (ref 96–108)
CO2 SERPL-SCNC: 29 MMOL/L (ref 21–32)
CREAT SERPL-MCNC: 1.86 MG/DL (ref 0.6–1.3)
GFR SERPL CREATININE-BSD FRML MDRD: 39 ML/MIN/1.73SQ M
GLUCOSE SERPL-MCNC: 169 MG/DL (ref 65–140)
GLUCOSE SERPL-MCNC: 179 MG/DL (ref 65–140)
GLUCOSE SERPL-MCNC: 188 MG/DL (ref 65–140)
GLUCOSE SERPL-MCNC: 201 MG/DL (ref 65–140)
GLUCOSE SERPL-MCNC: 239 MG/DL (ref 65–140)
MAGNESIUM SERPL-MCNC: 2 MG/DL (ref 1.9–2.7)
P AXIS: 51 DEGREES
P AXIS: 72 DEGREES
POTASSIUM SERPL-SCNC: 3.2 MMOL/L (ref 3.5–5.3)
PR INTERVAL: 144 MS
PR INTERVAL: 146 MS
PROT SERPL-MCNC: 6.5 G/DL (ref 6.4–8.4)
QRS AXIS: 53 DEGREES
QRS AXIS: 55 DEGREES
QRSD INTERVAL: 80 MS
QRSD INTERVAL: 82 MS
QT INTERVAL: 408 MS
QT INTERVAL: 440 MS
QTC INTERVAL: 459 MS
QTC INTERVAL: 497 MS
SODIUM SERPL-SCNC: 136 MMOL/L (ref 135–147)
T WAVE AXIS: -18 DEGREES
T WAVE AXIS: 69 DEGREES
VENTRICULAR RATE: 76 BPM
VENTRICULAR RATE: 77 BPM

## 2025-01-06 PROCEDURE — 99232 SBSQ HOSP IP/OBS MODERATE 35: CPT | Performed by: INTERNAL MEDICINE

## 2025-01-06 PROCEDURE — 82948 REAGENT STRIP/BLOOD GLUCOSE: CPT

## 2025-01-06 PROCEDURE — 83735 ASSAY OF MAGNESIUM: CPT | Performed by: NURSE PRACTITIONER

## 2025-01-06 PROCEDURE — 99232 SBSQ HOSP IP/OBS MODERATE 35: CPT | Performed by: NURSE PRACTITIONER

## 2025-01-06 PROCEDURE — 93010 ELECTROCARDIOGRAM REPORT: CPT | Performed by: INTERNAL MEDICINE

## 2025-01-06 PROCEDURE — 80053 COMPREHEN METABOLIC PANEL: CPT | Performed by: NURSE PRACTITIONER

## 2025-01-06 RX ORDER — POTASSIUM CHLORIDE 1500 MG/1
40 TABLET, EXTENDED RELEASE ORAL 2 TIMES DAILY
Status: COMPLETED | OUTPATIENT
Start: 2025-01-06 | End: 2025-01-06

## 2025-01-06 RX ADMIN — INSULIN LISPRO 1 UNITS: 100 INJECTION, SOLUTION INTRAVENOUS; SUBCUTANEOUS at 14:09

## 2025-01-06 RX ADMIN — FLUTICASONE PROPIONATE 1 SPRAY: 50 SPRAY, METERED NASAL at 17:44

## 2025-01-06 RX ADMIN — Medication 250 MG: at 17:44

## 2025-01-06 RX ADMIN — APIXABAN 5 MG: 5 TABLET, FILM COATED ORAL at 17:43

## 2025-01-06 RX ADMIN — Medication 250 MG: at 09:39

## 2025-01-06 RX ADMIN — FAMOTIDINE 20 MG: 20 TABLET, FILM COATED ORAL at 09:37

## 2025-01-06 RX ADMIN — ATORVASTATIN CALCIUM 10 MG: 10 TABLET, FILM COATED ORAL at 09:37

## 2025-01-06 RX ADMIN — BUDESONIDE AND FORMOTEROL FUMARATE DIHYDRATE 2 PUFF: 160; 4.5 AEROSOL RESPIRATORY (INHALATION) at 17:44

## 2025-01-06 RX ADMIN — INSULIN LISPRO 1 UNITS: 100 INJECTION, SOLUTION INTRAVENOUS; SUBCUTANEOUS at 09:39

## 2025-01-06 RX ADMIN — MONTELUKAST 10 MG: 10 TABLET, FILM COATED ORAL at 21:12

## 2025-01-06 RX ADMIN — SPIRONOLACTONE 25 MG: 25 TABLET, FILM COATED ORAL at 09:37

## 2025-01-06 RX ADMIN — CYANOCOBALAMIN TAB 500 MCG 1000 MCG: 500 TAB at 09:37

## 2025-01-06 RX ADMIN — FLUTICASONE PROPIONATE 1 SPRAY: 50 SPRAY, METERED NASAL at 09:39

## 2025-01-06 RX ADMIN — METOPROLOL SUCCINATE 50 MG: 50 TABLET, EXTENDED RELEASE ORAL at 09:37

## 2025-01-06 RX ADMIN — UMECLIDINIUM 1 PUFF: 62.5 AEROSOL, POWDER ORAL at 09:39

## 2025-01-06 RX ADMIN — LORATADINE 10 MG: 10 TABLET ORAL at 09:37

## 2025-01-06 RX ADMIN — POTASSIUM CHLORIDE 40 MEQ: 1500 TABLET, EXTENDED RELEASE ORAL at 14:09

## 2025-01-06 RX ADMIN — POTASSIUM CHLORIDE 40 MEQ: 1500 TABLET, EXTENDED RELEASE ORAL at 17:44

## 2025-01-06 RX ADMIN — EMPAGLIFLOZIN 10 MG: 10 TABLET, FILM COATED ORAL at 09:39

## 2025-01-06 RX ADMIN — INSULIN LISPRO 1 UNITS: 100 INJECTION, SOLUTION INTRAVENOUS; SUBCUTANEOUS at 17:45

## 2025-01-06 RX ADMIN — PREGABALIN 50 MG: 50 CAPSULE ORAL at 09:37

## 2025-01-06 RX ADMIN — BUDESONIDE AND FORMOTEROL FUMARATE DIHYDRATE 2 PUFF: 160; 4.5 AEROSOL RESPIRATORY (INHALATION) at 09:38

## 2025-01-06 RX ADMIN — APIXABAN 5 MG: 5 TABLET, FILM COATED ORAL at 09:37

## 2025-01-06 RX ADMIN — INSULIN LISPRO 2 UNITS: 100 INJECTION, SOLUTION INTRAVENOUS; SUBCUTANEOUS at 21:12

## 2025-01-06 NOTE — PROGRESS NOTES
Progress Note - Hospitalist   Name: Mesfin Cano 59 y.o. male I MRN: [id number]  Unit/Bed#: -01 I Date of Admission: 1/4/2025   Date of Service: 1/6/2025 I Hospital Day: 2    Assessment & Plan  Acute on chronic combined systolic and diastolic heart failure, NYHA class 3 (HCC)  Wt Readings from Last 3 Encounters:   01/06/25 (!) 143 kg (315 lb 11.2 oz)   12/04/24 (!) 147 kg (323 lb 6.4 oz)   11/28/24 (!) 143 kg (314 lb 9.6 oz)   EF 55%, G2DD, no significant valvular abnormalities  Presents with 7 lb weight gain, worsening LE edema, abdominal distention and SOB  Outpatient diuretic regimen: Bumex 6 mg tid + Metolazone 3x weekly  BNP 76 on admission   Started on bumex drip at 1 mg/hr with plan per HF to continue till this evening where it will be stopped  Labs checked in a.m. to determine if resumption of oral diuretic  Monitor on telemetry given bumex drip  Fluid restriction 1800 cc/ and 2 gram sodium diet   HF appreciated   Monitor I/Os, daily weights   Holding pta aldactone overnight while on bumex gtt   Primary hypertension  Continue metoprolol 50mg daily, monitor BP while on bumex drip  VICKY (obstructive sleep apnea)  C/w CPAP  Paroxysmal atrial fibrillation (HCC)  Continue metoprolol XL 50 mg qd  Eliquis for AC  Uncontrolled type 2 diabetes mellitus with hyperglycemia (HCC)  Lab Results   Component Value Date    HGBA1C 7.0 (H) 12/04/2024       Recent Labs     01/05/25  1605 01/05/25  2124 01/06/25  0808 01/06/25  1127   POCGLU 169* 154* 179* 201*       Blood Sugar Average: Last 72 hrs:  (P) 177.875  Outpatient regimen: sitagliptin, Jardiance (for CHF) - currently on hold   SSI with meals while hospitalized  Hypoglycemic protocol    Morbid obesity (HCC)  BMI 42.88   Encourage diet and weight loss   Stage 3b chronic kidney disease (HCC)  Lab Results   Component Value Date    EGFR 39 01/06/2025    EGFR 44 01/05/2025    EGFR 57 01/04/2025    CREATININE 1.86 (H) 01/06/2025    CREATININE 1.69 (H) 01/05/2025     CREATININE 1.36 (H) 01/04/2025   Creat BL 1.5-2.0  Monitor creat while on bumex gtt  Avoid hypotension and nephrotoxic drugs   Check am cmp  Serum total bilirubin elevated  T bili level on admission 1.41- monitor   Initially reported abd pain on admission and loose stools  CT A/P negative   Elevation likely in the setting of CHF exacerbation   Repeat am cmp   Monitor stools- consider cdiff testing with recent admission if diarrhea occurs, RN to monitor stools     VTE Pharmacologic Prophylaxis: VTE Score: 4 High Risk (Score >/= 5) - Pharmacological DVT Prophylaxis Ordered: apixaban (Eliquis). Sequential Compression Devices Ordered.    Mobility:   Basic Mobility Inpatient Raw Score: 24  JH-HLM Goal: 8: Walk 250 feet or more  JH-HLM Achieved: 8: Walk 250 feet ot more  JH-HLM Goal achieved. Continue to encourage appropriate mobility.    Patient Centered Rounds: I performed bedside rounds with nursing staff today.   Discussions with Specialists or Other Care Team Provider: nursing     Education and Discussions with Family / Patient: Patient declined call to .     Current Length of Stay: 2 day(s)  Current Patient Status: Inpatient   Certification Statement: The patient will continue to require additional inpatient hospital stay due to iv diuretics   Discharge Plan: Anticipate discharge in 48-72 hrs to home.    Code Status: Level 1 - Full Code    Subjective   Patient is known to me upon entry to the room he looks well he is on room air.  He reports that he only had a few pounds to lose and is feeling a lot better after the diuretics he is voiding and having appropriate response.  He states that cardiology told him they would be taking the drip off later tonight.  He reports ambulating hallways and feeling some shortness of breath today but significantly improved    Objective :  Temp:  [97.3 °F (36.3 °C)-98.6 °F (37 °C)] 98.6 °F (37 °C)  HR:  [71-88] 78  BP: (125-145)/(73-81) 145/80  Resp:  [16-19] 18  SpO2:   [94 %-98 %] 94 %  O2 Device: None (Room air)    Body mass index is 41.65 kg/m².     Input and Output Summary (last 24 hours):     Intake/Output Summary (Last 24 hours) at 1/6/2025 1718  Last data filed at 1/6/2025 1419  Gross per 24 hour   Intake 900 ml   Output 2750 ml   Net -1850 ml       Physical Exam  Constitutional:       General: He is not in acute distress.     Appearance: He is obese. He is not ill-appearing, toxic-appearing or diaphoretic.   HENT:      Head: Normocephalic and atraumatic.   Eyes:      General:         Right eye: No discharge.         Left eye: No discharge.   Cardiovascular:      Rate and Rhythm: Normal rate.      Heart sounds: No murmur heard.     No friction rub. No gallop.   Pulmonary:      Effort: No respiratory distress.      Breath sounds: No stridor. Rales (Left lower lobe crackles) present. No wheezing or rhonchi.   Chest:      Chest wall: No tenderness.   Abdominal:      General: There is no distension.      Palpations: There is no mass.      Tenderness: There is no abdominal tenderness. There is no guarding or rebound.      Hernia: No hernia is present.   Musculoskeletal:         General: No swelling, tenderness, deformity or signs of injury.      Right lower leg: Edema present.      Left lower leg: Edema present.   Skin:     Coloration: Skin is not jaundiced or pale.      Findings: No bruising, erythema, lesion or rash.   Neurological:      Mental Status: He is alert and oriented to person, place, and time.   Psychiatric:         Behavior: Behavior normal.           Lines/Drains:        Telemetry:  Telemetry Orders (From admission, onward)               24 Hour Telemetry Monitoring  Continuous x 24 Hours (Telem)        Expiring   Question:  Reason for 24 Hour Telemetry  Answer:  Decompensated CHF- and any one of the following: continuous diuretic infusion or total diuretic dose >200 mg daily, associated electrolyte derangement (I.e. K < 3.0), inotropic drip (continuous infusion),  Comment: Subcutaneous cyst hx of ventricular arrhythmia, or new EF < 35%                     Telemetry Reviewed: Normal Sinus Rhythm  Indication for Continued Telemetry Use: Acute CHF on >200 mg lasix/day or equivalent dose or with new reduced EF.                Lab Results: I have reviewed the following results:   Results from last 7 days   Lab Units 01/05/25  0420   WBC Thousand/uL 9.20   HEMOGLOBIN g/dL 12.8   HEMATOCRIT % 38.6   PLATELETS Thousands/uL 255   SEGS PCT % 69   LYMPHO PCT % 14   MONO PCT % 9   EOS PCT % 6     Results from last 7 days   Lab Units 01/06/25  0514   SODIUM mmol/L 136   POTASSIUM mmol/L 3.2*   CHLORIDE mmol/L 94*   CO2 mmol/L 29   BUN mg/dL 31*   CREATININE mg/dL 1.86*   ANION GAP mmol/L 13   CALCIUM mg/dL 8.2*   ALBUMIN g/dL 3.8   TOTAL BILIRUBIN mg/dL 1.04*   ALK PHOS U/L 113*   ALT U/L 7   AST U/L 11*   GLUCOSE RANDOM mg/dL 169*     Results from last 7 days   Lab Units 01/05/25  0420   INR  0.96     Results from last 7 days   Lab Units 01/06/25  1127 01/06/25  0808 01/05/25  2124 01/05/25  1605 01/05/25  1139 01/05/25  0815 01/05/25  0711 01/05/25  0026   POC GLUCOSE mg/dl 201* 179* 154* 169* 195* 174* 164* 187*               Recent Cultures (last 7 days):         Imaging Results Review: I reviewed radiology reports from this admission including: CT abdomen/pelvis.  Other Study Results Review: EKG was reviewed.     Last 24 Hours Medication List:     Current Facility-Administered Medications:     acetaminophen (TYLENOL) tablet 650 mg, Q6H PRN    apixaban (ELIQUIS) tablet 5 mg, BID    atorvastatin (LIPITOR) tablet 10 mg, Daily    budesonide-formoterol (SYMBICORT) 160-4.5 mcg/act inhaler 2 puff, BID    bumetanide (BUMEX) 12.5 mg infusion 50 mL, Continuous, Last Rate: 1 mg/hr (01/05/25 2222)    cyanocobalamin (VITAMIN B-12) tablet 1,000 mcg, Daily    Empagliflozin (JARDIANCE) tablet 10 mg, Daily    famotidine (PEPCID) tablet 20 mg, Daily    fluticasone (FLONASE) 50 mcg/act nasal spray 1 spray, BID    insulin lispro  (HumALOG/ADMELOG) 100 units/mL subcutaneous injection 1-5 Units, TID AC **AND** Fingerstick Glucose (POCT), TID AC    insulin lispro (HumALOG/ADMELOG) 100 units/mL subcutaneous injection 1-5 Units, HS    loratadine (CLARITIN) tablet 10 mg, Daily    metoprolol succinate (TOPROL-XL) 24 hr tablet 50 mg, Daily    montelukast (SINGULAIR) tablet 10 mg, HS    potassium chloride (Klor-Con M20) CR tablet 40 mEq, BID    pregabalin (LYRICA) capsule 50 mg, Daily    saccharomyces boulardii (FLORASTOR) capsule 250 mg, BID    Insert peripheral IV, Once **AND** sodium chloride (PF) 0.9 % injection 3 mL, Q1H PRN    spironolactone (ALDACTONE) tablet 25 mg, Daily    umeclidinium 62.5 mcg/actuation inhaler AEPB 1 puff, Daily    Administrative Statements   Today, Patient Was Seen By: RODRIGUE Wagner      **Please Note: This note may have been constructed using a voice recognition system.**   Render Risk Assessment In Note?: yes Detail Level: Detailed

## 2025-01-06 NOTE — PROGRESS NOTES
Progress Note - Heart Failure   Name: Mesfin Cano 59 y.o. male I MRN: [id number]  Unit/Bed#: -01 I Date of Admission: 1/4/2025   Date of Service: 1/6/2025 I Hospital Day: 2    Assessment & Plan  Acute on chronic combined systolic and diastolic heart failure, NYHA class 3 (HCC)  Presented 1/4 with weight gain 7+ rounds, edema, worsening dyspnea on exertion.  No clear trigger  June 2024 echo with 55% EF  Home diuretics: Bumex 6 mg 3 times daily, metolazone 5 mg 3 times weekly  BNP 76, previously 250s to 400s  Weight now back at 315 pounds, patient not quite feeling back to baseline  1200 cc urine, net -700 cc  Telemetry with no events overnight    Plan  Discussed with attending   Continue Bumex infusion at 1 mg/h until this evening  Fluid restriction 1800 cc, 2 g sodium diet  Continue home meds: Metoprolol XL 50 mg daily, Aldactone 25 mg daily, Jardiance 10 mg daily  Primary hypertension  Continue meds as above  VICKY (obstructive sleep apnea)  Encouraged CPAP use  Paroxysmal atrial fibrillation (HCC)  Maintained on Eliquis  Stage 3b chronic kidney disease (HCC)  Lab Results   Component Value Date    EGFR 39 01/06/2025    EGFR 44 01/05/2025    EGFR 57 01/04/2025    CREATININE 1.86 (H) 01/06/2025    CREATININE 1.69 (H) 01/05/2025    CREATININE 1.36 (H) 01/04/2025     Baseline creatinine appears to be anywhere from 1.4-2, still within baseline    Subjective     Patient feeling somewhat better than admission, still having dyspnea on exertion when he is walking the halls    Objective :  Temp:  [97.3 °F (36.3 °C)-98.3 °F (36.8 °C)] 98.3 °F (36.8 °C)  HR:  [73-88] 88  BP: (125-134)/(78-81) 125/78  Resp:  [16-19] 18  SpO2:  [94 %-98 %] 97 %  O2 Device: None (Room air)  Orthostatic Blood Pressures      Flowsheet Row Most Recent Value   Blood Pressure 125/78 filed at 01/06/2025 0833          First Weight: Weight - Scale: (!) 147 kg (325 lb) (01/04/25 2013)  Vitals:    01/04/25 2013 01/06/25 0600   Weight: (!) 147 kg (325  lb) (!) 143 kg (315 lb 11.2 oz)     Physical Exam  Constitutional:       General: He is not in acute distress.     Appearance: He is obese.   HENT:      Mouth/Throat:      Mouth: Mucous membranes are moist.      Pharynx: Oropharynx is clear.   Cardiovascular:      Rate and Rhythm: Normal rate and regular rhythm.      Heart sounds: No murmur heard.  Pulmonary:      Effort: Pulmonary effort is normal.      Breath sounds: Normal breath sounds. No wheezing or rales.   Abdominal:      Palpations: Abdomen is soft.      Tenderness: There is no abdominal tenderness.   Musculoskeletal:      Right lower leg: Edema (Trace) present.      Left lower leg: Edema (Trace) present.   Skin:     General: Skin is warm and dry.   Neurological:      Mental Status: He is alert. Mental status is at baseline.           Lab Results: I have reviewed the following results:  Results from last 7 days   Lab Units 01/05/25  0420 01/04/25  1544   WBC Thousand/uL 9.20 7.18   HEMOGLOBIN g/dL 12.8 13.3   HEMATOCRIT % 38.6 40.5   PLATELETS Thousands/uL 255 274     Results from last 7 days   Lab Units 01/06/25  0514 01/05/25  0420 01/04/25  1544   POTASSIUM mmol/L 3.2* 4.0 4.1   CHLORIDE mmol/L 94* 94* 97   CO2 mmol/L 29 26 24   BUN mg/dL 31* 28* 25   CREATININE mg/dL 1.86* 1.69* 1.36*   CALCIUM mg/dL 8.2* 8.2* 8.1*     Results from last 7 days   Lab Units 01/05/25  0420   INR  0.96   PTT seconds 29     Lab Results   Component Value Date    HGBA1C 7.0 (H) 12/04/2024     Lab Results   Component Value Date    TROPONINI <0.02 09/22/2021             VTE Pharmacologic Prophylaxis: VTE covered by:  apixaban, Oral, 5 mg at 01/05/25 1713     VTE Mechanical Prophylaxis: sequential compression device

## 2025-01-06 NOTE — UTILIZATION REVIEW
Initial Clinical Review    Admission: Date/Time/Statement:   Admission Orders (From admission, onward)       Ordered        01/04/25 2042  INPATIENT ADMISSION  Once                          Orders Placed This Encounter   Procedures    INPATIENT ADMISSION     Standing Status:   Standing     Number of Occurrences:   1     Level of Care:   Med Surg [16]     Estimated length of stay:   More than 2 Midnights     Certification:   I certify that inpatient services are medically necessary for this patient for a duration of greater than two midnights. See H&P and MD Progress Notes for additional information about the patient's course of treatment.     ED Arrival Information       Expected   1/4/2025     Arrival   1/4/2025 15:28    Acuity   Urgent              Means of arrival   Ambulance    Escorted by   [facility] ([place]    Service   Hospitalist    Admission type   Emergency              Arrival complaint   SOB             Chief Complaint   Patient presents with    Shortness of Breath     Pt presents by [facility] with c/o chf exacerbation. Increased edema x 3 days, shortness of breath x 1 day       Initial Presentation: 59 y.o. male presents to ed from home on 1-4-25 via ems  for evaluation and treatment of CHF exacerbation including :  shortness of breath, increased edema, 7 lb wt gain.  PMHX: AFIB, DM, VICKY, CHF.      Clinical assessment significant for iv bumex gtt, symbicort, singular, flonase, sq insulin, lyrica.  Admit to inpatient med surg for acute on chronic CHF.  Plan includes : heart failure consult, telemetry, iv bumex, monitor I/O, daily wt. .      Anticipated Length of Stay/Certification Statement: Patient will be admitted on an inpatient basis with an anticipated length of stay of greater than 2 midnights secondary to CHF exacerbation.     Date: 1-5-25    Day 2: inpatient med surg   Certification Statement: The patient will continue to require additional inpatient hospital stay due to iv bumex gtt    Discharge Plan: Anticipate discharge in 48-72 hrs to home.    Heart failure consult : NYHA III, HFpEF stage 3. Continue bumex gtt, I/O , daily wt, Keep K>4 and Mag >2.  Patient reports compliance with medication.  Monitor on telemetry.  Hold aldactone.      Date: 1-6-25   inpatient med surg     Continue chronic CHF.  WT daily:  325 lb> 315lb.  I/O 900 ml net output. CR 1.69>1.80.  Remains on telemetry.    Continue iv bumex gtt.            ED Treatment-Medication Administration from 01/04/2025 1528 to 01/05/2025 0753         Date/Time Order Dose Route Action     01/04/2025 1815 bumetanide (BUMEX) injection 6 mg 6 mg Intravenous Given     01/04/2025 2250 bumetanide (BUMEX) 12.5 mg infusion 50 mL 1 mg/hr Intravenous New Bag     01/05/2025 0707 bumetanide (BUMEX) 12.5 mg infusion 50 mL 1 mg/hr Intravenous New Bag     01/04/2025 2252 budesonide-formoterol (SYMBICORT) 160-4.5 mcg/act inhaler 2 puff 2 puff Inhalation Given     01/04/2025 2251 fluticasone (FLONASE) 50 mcg/act nasal spray 1 spray 1 spray Nasal Given     01/04/2025 2251 montelukast (SINGULAIR) tablet 10 mg 10 mg Oral Given     01/05/2025 0035 insulin lispro (HumALOG/ADMELOG) 100 units/mL subcutaneous injection 1-5 Units 1 Units Subcutaneous Given     01/04/2025 2255 pregabalin (LYRICA) capsule 50 mg 50 mg Oral Given       Scheduled Medications:    apixaban, 5 mg, Oral, BID  atorvastatin, 10 mg, Oral, Daily  budesonide-formoterol, 2 puff, Inhalation, BID  cyanocobalamin, 1,000 mcg, Oral, Daily  Empagliflozin, 10 mg, Oral, Daily  famotidine, 20 mg, Oral, Daily  fluticasone, 1 spray, Nasal, BID  insulin lispro, 1-5 Units, Subcutaneous, TID AC  insulin lispro, 1-5 Units, Subcutaneous, HS  loratadine, 10 mg, Oral, Daily  metoprolol succinate, 50 mg, Oral, Daily  montelukast, 10 mg, Oral, HS  pregabalin, 50 mg, Oral, Daily  saccharomyces boulardii, 250 mg, Oral, BID  spironolactone, 25 mg, Oral, Daily  umeclidinium, 1 puff, Inhalation, Daily      Continuous IV  Infusions:  bumetanide (BUMEX) 12.5 mg infusion 50 mL, 1 mg/hr, Intravenous, Continuous      PRN Meds:  acetaminophen, 650 mg, Oral, Q6H PRN  sodium chloride (PF), 3 mL, Intravenous, Q1H PRN      ED Triage Vitals   Temperature Pulse Respirations Blood Pressure SpO2 Pain Score   01/04/25 1532 01/04/25 1532 01/04/25 1532 01/04/25 1532 01/04/25 1532 01/04/25 1830   97.6 °F (36.4 °C) 84 (!) 30 136/68 100 % No Pain     Weight (last 2 days)       Date/Time Weight    01/06/25 0600 143 (315.7)    01/04/25 2013 147 (325)            Vital Signs (last 3 days)       Date/Time Temp Pulse Resp BP MAP (mmHg) SpO2 O2 Device [place] Coma Scale Score Pain    01/06/25 08:33:20 98.3 °F (36.8 °C) 88 18 125/78 94 97 % -- -- --    01/06/25 03:18:19 -- 73 19 131/81 98 98 % -- -- --    01/05/25 22:25:15 97.6 °F (36.4 °C) 78 17 132/81 98 95 % -- -- --    01/05/25 21:33:15 97.3 °F (36.3 °C) 79 16 132/80 97 94 % -- -- --    01/05/25 2000 -- -- -- -- -- -- None (Room air) 15 No Pain    01/05/25 15:45:52 98.1 °F (36.7 °C) 76 -- 134/79 97 96 % -- -- --    01/05/25 1352 -- -- -- -- -- -- -- -- No Pain    01/05/25 0729 -- 84 20 145/97 118 96 % None (Room air) 15 No Pain    01/05/25 0630 -- 98 19 123/67 93 98 % -- -- --    01/05/25 0600 -- 80 -- -- -- 98 % -- -- --    01/05/25 0500 -- 74 -- -- -- 94 % -- -- --    01/05/25 0430 -- 80 17 148/69 100 96 % None (Room air) -- No Pain    01/05/25 0400 -- 94 -- -- -- 97 % -- -- --    01/05/25 0300 -- 84 -- -- -- 96 % -- -- --    01/05/25 0200 -- 78 -- -- -- 97 % -- -- --    01/05/25 0130 -- 94 -- 144/85 108 98 % -- -- --    01/04/25 2300 -- 84 18 162/87 119 98 % -- 15 --    01/04/25 2253 -- -- 20 -- -- -- -- -- --    01/04/25 1830 -- 84 20 136/71 96 95 % -- -- No Pain    01/04/25 1547 -- -- -- -- -- -- None (Room air) -- --    01/04/25 1532 97.6 °F (36.4 °C) 84 30 136/68 -- 100 % None (Room air) -- --         Pertinent Labs/Diagnostic Test Results:   Radiology:  CT abdomen pelvis wo contrast   Final  (01/04  1951)      No evidence of bowel obstruction, colitis or diverticulitis.         X-ray chest 2 views   Final (01/04 1828)      No acute cardiopulmonary disease.        Cardiology:  No orders to display     GI:  No orders to display           Results from last 7 days   Lab Units 01/05/25  0420 01/04/25  1544   WBC Thousand/uL 9.20 7.18   HEMOGLOBIN g/dL 12.8 13.3   HEMATOCRIT % 38.6 40.5   PLATELETS Thousands/uL 255 274   TOTAL NEUT ABS Thousands/µL 6.37 4.83         Results from last 7 days   Lab Units 01/06/25  0514 01/05/25  0420 01/04/25  1544 12/31/24  0927   SODIUM mmol/L 136 134* 135 136   POTASSIUM mmol/L 3.2* 4.0 4.1 4.4   CHLORIDE mmol/L 94* 94* 97 96   CO2 mmol/L 29 26 24 27   ANION GAP mmol/L 13 14* 14* 13   BUN mg/dL 31* 28* 25 25   CREATININE mg/dL 1.86* 1.69* 1.36* 1.31*   EGFR ml/min/1.73sq m 39 44 57 60   CALCIUM mg/dL 8.2* 8.2* 8.1* 8.9   MAGNESIUM mg/dL 2.0 1.9  --   --      Results from last 7 days   Lab Units 01/06/25  0514 01/05/25  0420 01/04/25  1544   AST U/L 11* 13 15   ALT U/L 7 10 9   ALK PHOS U/L 113* 120* 124*   TOTAL PROTEIN g/dL 6.5 7.0 7.0   ALBUMIN g/dL 3.8 4.1 4.1   TOTAL BILIRUBIN mg/dL 1.04* 1.41* 0.43     Results from last 7 days   Lab Units 01/06/25  0808 01/05/25  2124 01/05/25  1605 01/05/25  1139 01/05/25  0815 01/05/25  0711 01/05/25  0026   POC GLUCOSE mg/dl 179* 154* 169* 195* 174* 164* 187*     Results from last 7 days   Lab Units 01/06/25  0514 01/05/25  0420 01/04/25  1544   GLUCOSE RANDOM mg/dL 169* 159* 166*       Results from last 7 days   Lab Units 01/04/25 1955 01/04/25  1544   HS TNI 0HR ng/L  --  12   HS TNI 2HR ng/L 11  --    HSTNI D2 ng/L -1  --          Results from last 7 days   Lab Units 01/05/25  0420   PROTIME seconds 13.1   INR  0.96   PTT seconds 29     Results from last 7 days   Lab Units 01/04/25  1544   BNP pg/mL 76     Past Medical History:   Diagnosis Date    Abnormal stress test 06/26/2024    Acute gastritis without hemorrhage     last assessed  3/24/17    Asthma     Atrial fibrillation (HCC)     Bilateral leg edema 02/19/2020    CHF (congestive heart failure) (HCC)     Coronary artery disease     Daytime sleepiness 07/17/2019    Diabetes mellitus (HCC)     Dyspnea on exertion 10/11/2021    Edema of both feet 01/23/2020    Electrolyte abnormality 10/05/2024    Gastric bypass status for obesity     Hyperlipidemia     Hypertension     Hypokalemia 11/14/2021    Impaired fasting glucose     last assessed 5/10/17    Knee sprain, bilateral 06/14/2018    Leg cramps 06/16/2022    Obesity     Uncontrolled atrial flutter (HCC) 06/18/2024    Viral gastroenteritis     last assessed 11/4/16     Present on Admission:   Paroxysmal atrial fibrillation (HCC)   VICKY (obstructive sleep apnea)   Uncontrolled type 2 diabetes mellitus with hyperglycemia (HCC)   Primary hypertension   Morbid obesity (HCC)   Stage 3b chronic kidney disease (HCC)      Admitting Diagnosis:     Shortness of breath [R06.02]  CHF (congestive heart failure) (HCC) [I50.9]  Acute on chronic combined systolic and diastolic heart failure, NYHA class 3 (HCC) [I50.43]    Age/Sex: 59 y.o. male    Network Utilization Review Department  ATTENTION: Please call with any questions or concerns to [phone] and carefully listen to the prompts so that you are directed to the right person. All voicemails are confidential.   For Discharge needs, contact Care Management DC Support Team at [phone] opt. 2  Send all requests for admission clinical reviews, approved or denied determinations and any other requests to dedicated fax number below belonging to the campus where the patient is receiving treatment. List of dedicated fax numbers for the Facilities:  FACILITY NAME UR FAX NUMBER   ADMISSION DENIALS (Administrative/Medical Necessity) [phone]   DISCHARGE SUPPORT TEAM (NETWORK) [phone]   PARENT CHILD HEALTH (Maternity/NICU/Pediatrics) [phone]   [facility] [phone]    [facility] [phone]   [facility] [phone]   [facility] [phone]   [facility] [phone]   [facility] [phone]   [facility] [phone]   [facility] [phone]   [facility] [phone]   On license of [facility] [phone]   [facility] [phone]   [facility] [phone]

## 2025-01-06 NOTE — ASSESSMENT & PLAN NOTE
Wt Readings from Last 3 Encounters:   01/06/25 (!) 143 kg (315 lb 11.2 oz)   12/04/24 (!) 147 kg (323 lb 6.4 oz)   11/28/24 (!) 143 kg (314 lb 9.6 oz)   EF 55%, G2DD, no significant valvular abnormalities  Presents with 7 lb weight gain, worsening LE edema, abdominal distention and SOB  Outpatient diuretic regimen: Bumex 6 mg tid + Metolazone 3x weekly  BNP 76 on admission   Started on bumex drip at 1 mg/hr with plan per HF to continue till this evening where it will be stopped  Labs checked in a.m. to determine if resumption of oral diuretic  Monitor on telemetry given bumex drip  Fluid restriction 1800 cc/ and 2 gram sodium diet   HF appreciated   Monitor I/Os, daily weights   Holding pta aldactone overnight while on bumex gtt

## 2025-01-06 NOTE — ASSESSMENT & PLAN NOTE
Lab Results   Component Value Date    EGFR 39 01/06/2025    EGFR 44 01/05/2025    EGFR 57 01/04/2025    CREATININE 1.86 (H) 01/06/2025    CREATININE 1.69 (H) 01/05/2025    CREATININE 1.36 (H) 01/04/2025     Baseline creatinine appears to be anywhere from 1.4-2, still within baseline

## 2025-01-06 NOTE — ED PROVIDER NOTES
Time reflects when diagnosis was documented in both MDM as applicable and the Disposition within this note       Time User Action Codes Description Comment    1/4/2025  9:04 PM Daquan Bender Add [I50.9] CHF (congestive heart failure) (HCC)     1/4/2025  9:38 PM Justino Prasad Add [I50.43] Acute on chronic combined systolic and diastolic heart failure, NYHA class 3 (HCC)           ED Disposition       ED Disposition   Admit    Condition   Stable    Date/Time   Sat Jan 4, 2025  8:41 PM    Comment   Case was discussed with Dr Prasad and the patient's admission status was agreed to be Admission Status: inpatient status to the service of Dr. Prasad .               Assessment & Plan       Medical Decision Making  Patient is 59M with PMH of chf who presents to the ED with dyspnea, weight gain, leg swelling.    Vital signs stable. On exam mild abd distention w/ ttp. B/L LE edema.    History and physical exam most consistent with CHF exacerbation. However, differential diagnosis included but not limited to hypothyroidism, arrhythmia, ACS, SBO. Plan CT abd, CXR, cbc, cmp, trop, BNP    View ED course above for further discussion on patient workup.    All labs reviewed and utilized in the medical decision making process  All radiology studies independently viewed by me and interpreted by the radiologist.  I reviewed all testing with the patient.     Upon re-evaluation still dyspneic, requesting admission.  Will give home dose of Bumex, admit to hospital, presumptive diagnosis of CHF, BNP still pending.        Amount and/or Complexity of Data Reviewed  Labs: ordered.  Radiology: ordered.    Risk  Prescription drug management.  Decision regarding hospitalization.             Medications   sodium chloride (PF) 0.9 % injection 3 mL (has no administration in time range)   bumetanide (BUMEX) 12.5 mg infusion 50 mL (1 mg/hr Intravenous New Bag 1/5/25 0707)   acetaminophen (TYLENOL) tablet 650 mg (has no administration in time range)    apixaban (ELIQUIS) tablet 5 mg (has no administration in time range)   atorvastatin (LIPITOR) tablet 10 mg (has no administration in time range)   budesonide-formoterol (SYMBICORT) 160-4.5 mcg/act inhaler 2 puff (2 puffs Inhalation Given 1/4/25 2252)   cyanocobalamin (VITAMIN B-12) tablet 1,000 mcg (has no administration in time range)   famotidine (PEPCID) tablet 20 mg (has no administration in time range)   fluticasone (FLONASE) 50 mcg/act nasal spray 1 spray (1 spray Nasal Given 1/4/25 2251)   loratadine (CLARITIN) tablet 10 mg (has no administration in time range)   metoprolol succinate (TOPROL-XL) 24 hr tablet 50 mg (has no administration in time range)   montelukast (SINGULAIR) tablet 10 mg (10 mg Oral Given 1/4/25 2251)   umeclidinium 62.5 mcg/actuation inhaler AEPB 1 puff (has no administration in time range)   insulin lispro (HumALOG/ADMELOG) 100 units/mL subcutaneous injection 1-5 Units (has no administration in time range)   insulin lispro (HumALOG/ADMELOG) 100 units/mL subcutaneous injection 1-5 Units (1 Units Subcutaneous Given 1/5/25 0035)   pregabalin (LYRICA) capsule 50 mg (50 mg Oral Given 1/4/25 2255)   bumetanide (BUMEX) injection 6 mg (6 mg Intravenous Given 1/4/25 1815)       ED Risk Strat Scores                          SBIRT 20yo+      Flowsheet Row Most Recent Value   Initial Alcohol Screen: US AUDIT-C     1. How often do you have a drink containing alcohol? 0 Filed at: 01/04/2025 1532   2. How many drinks containing alcohol do you have on a typical day you are drinking?  0 Filed at: 01/04/2025 1532   3a. Male UNDER 65: How often do you have five or more drinks on one occasion? 0 Filed at: 01/04/2025 1532   3b. FEMALE Any Age, or MALE 65+: How often do you have 4 or more drinks on one occassion? 0 Filed at: 01/04/2025 1532   Audit-C Score 0 Filed at: 01/04/2025 1532   TASH: How many times in the past year have you...    Used an illegal drug or used a prescription medication for non-medical  reasons? Never Filed at: 01/04/2025 1532                            History of Present Illness       Chief Complaint   Patient presents with    Shortness of Breath     [facility] with c/o chf exacerbation. Increased edema x 3 days, shortness of breath x 1 day       Past Medical History:   Diagnosis Date    Abnormal stress test 06/26/2024    Acute gastritis without hemorrhage     last assessed 3/24/17    Asthma     Atrial fibrillation (HCC)     Bilateral leg edema 02/19/2020    CHF (congestive heart failure) (HCC)     Coronary artery disease     Daytime sleepiness 07/17/2019    Diabetes mellitus (HCC)     Dyspnea on exertion 10/11/2021    Edema of both feet 01/23/2020    Electrolyte abnormality 10/05/2024    Gastric bypass status for obesity     Hyperlipidemia     Hypertension     Hypokalemia 11/14/2021    Impaired fasting glucose     last assessed 5/10/17    Knee sprain, bilateral 06/14/2018    Leg cramps 06/16/2022    Obesity     Uncontrolled atrial flutter (HCC) 06/18/2024    Viral gastroenteritis     last assessed 11/4/16      Past Surgical History:   Procedure Laterality Date    CARDIAC CATHETERIZATION N/A 3/9/2022    Procedure: CARDIAC RHC W/ PRESSURE SENSOR;  Surgeon: Aminata Wilcox MD;  [facility];  Service: Cardiology    CARDIAC CATHETERIZATION N/A 9/6/2022    Procedure: Cardiac catheterization;  Surgeon: Yolanda Del Rosario DO;  [facility];  Service: Cardiology    CARDIAC CATHETERIZATION N/A 9/6/2022    Procedure: Cardiac Coronary Angiogram;  Surgeon: Yolanda Del Rosario DO;  [facility];  Service: Cardiology    CARDIAC CATHETERIZATION N/A 10/11/2023    Procedure: Cardiac RHC;  Surgeon: Yoel Darden MD;  [facility];  Service: Cardiology    CARPAL TUNNEL RELEASE      bilateral    CHOLECYSTECTOMY LAPAROSCOPIC N/A 2/7/2024    Procedure: CHOLECYSTECTOMY LAPAROSCOPIC;  Surgeon: Nena Ferguson MD;  Location: [facility]  OR;  Service: General    GASTRIC BYPASS  2004    with han en y    HERNIA REPAIR      ventral inscisional    IR BIOPSY BONE MARROW  12/14/2023    LAPAROSCOPIC TRANSGASTRIC ACCESS FOR ERCP N/A 2/7/2024    Procedure: LAPAROSCOPIC TRANSGASTRIC ACCESS FOR ERCP;  Surgeon: Nena Ferguson MD;  Location: [facility];  Service: General    LAPAROSCOPIC TRANSGASTRIC ACCESS FOR ERCP N/A 2/7/2024    Procedure: ERCP, sphincterotomy, stone extraction;  Surgeon: Rey Ferguson MD;  Location: [facility];  Service: Gastroenterology    TONSILLECTOMY        Family History   Problem Relation Age of Onset    Stroke Mother     Hypertension Father     Cancer Father     COPD Father       Social History     Tobacco Use    Smoking status: Former     Current packs/day: 1.00     Average packs/day: 1 pack/day for 5.0 years (5.0 ttl pk-yrs)     Types: Pipe, Cigars, Cigarettes     Start date: 2016     Quit date: 1/1/2021     Passive exposure: Never    Smokeless tobacco: Former    Tobacco comments:     cigar once a day   Vaping Use    Vaping status: Never Used   Substance Use Topics    Alcohol use: Yes     Alcohol/week: 2.0 standard drinks of alcohol     Types: 2 Shots of liquor per week     Comment: social    Drug use: No      E-Cigarette/Vaping    E-Cigarette Use Never User       E-Cigarette/Vaping Substances    Nicotine No     THC No     CBD No     Flavoring No     Other No     Unknown No       I have reviewed and agree with the history as documented.     Patient is a 59-year-old male with a past medical history of CHF, on 6 mg 3 times daily Bumex at home after recent discharge for CHF exacerbation, presenting for evaluation of subjective dyspnea, as well as weight gain and leg swelling.  Patient reports that he is 7 pounds up from his normal weight, and is noted worsening distention and bilateral lower extremity edema, which is characteristic of his CHF exacerbations.  Patient reports that he has many CHF exacerbations and often comes into  the [facility] for treatment of these.  Patient denies any chest pain, fevers, falls, trauma, or any other complaints on review of systems.  No nausea, vomiting, having normal bowel movements        Review of Systems   All other systems reviewed and are negative.          Objective       ED Triage Vitals   Temperature Pulse Blood Pressure Respirations SpO2 Patient Position - Orthostatic VS   01/04/25 1532 01/04/25 1532 01/04/25 1532 01/04/25 1532 01/04/25 1532 --   97.6 °F (36.4 °C) 84 136/68 (!) 30 100 %       Temp Source Heart Rate Source BP Location FiO2 (%) Pain Score    01/04/25 1532 01/04/25 1532 -- -- 01/04/25 1830    Tympanic Monitor   No Pain      Vitals      Date and Time Temp Pulse SpO2 Resp BP Pain Score FACES Pain Rating User   01/06/25 1551 98.6 °F (37 °C) 78 94 % 18 145/80 -- -- DII   01/06/25 1128 97.7 °F (36.5 °C) 71 97 % 18 136/73 -- -- DII   01/06/25 0833 98.3 °F (36.8 °C) 88 97 % 18 125/78 -- -- DII   01/06/25 0318 -- 73 98 % 19 131/81 -- -- DII   01/05/25 2225 97.6 °F (36.4 °C) 78 95 % 17 132/81 -- -- DII   01/05/25 2133 97.3 °F (36.3 °C) 79 94 % 16 132/80 -- -- DII   01/05/25 2000 -- -- -- -- -- No Pain -- GM   01/05/25 1545 98.1 °F (36.7 °C) 76 96 % -- 134/79 -- -- DII   01/05/25 1352 -- -- -- -- -- No Pain -- JN   01/05/25 0729 -- 84 96 % 20 145/97 -- -- CS   01/05/25 0729 -- -- -- -- -- No Pain -- JN   01/05/25 0630 -- 98 98 % 19 123/67 -- -- JT   01/05/25 0600 -- 80 98 % -- -- -- -- JT   01/05/25 0500 -- 74 94 % -- -- -- -- JT   01/05/25 0430 -- 80 96 % 17 148/69 No Pain -- JT   01/05/25 0400 -- 94 97 % -- -- -- -- JT   01/05/25 0300 -- 84 96 % -- -- -- -- JT   01/05/25 0200 -- 78 97 % -- -- -- -- JT   01/05/25 0130 -- 94 98 % -- 144/85 -- -- JT   01/04/25 2300 -- 84 98 % 18 162/87 -- -- JT   01/04/25 2253 -- -- -- 20 -- -- -- CS   01/04/25 1830 -- 84 95 % 20 136/71 No Pain -- BG   01/04/25 1532 97.6 °F (36.4 °C) 84 100 % 30 136/68 -- -- MR            Physical Exam  Vitals and nursing note  reviewed.   Constitutional:       General: He is not in acute distress.     Appearance: He is well-developed. He is obese. He is not ill-appearing.   HENT:      Head: Normocephalic and atraumatic.      Nose: Nose normal.      Mouth/Throat:      Mouth: Mucous membranes are moist.      Pharynx: Oropharynx is clear.   Eyes:      Conjunctiva/sclera: Conjunctivae normal.   Cardiovascular:      Rate and Rhythm: Normal rate and regular rhythm.      Heart sounds: No murmur heard.  Pulmonary:      Effort: Pulmonary effort is normal. No respiratory distress.      Breath sounds: Normal breath sounds. No decreased breath sounds or rales.   Abdominal:      General: Abdomen is flat. Bowel sounds are normal.      Palpations: Abdomen is soft.      Tenderness: There is no abdominal tenderness.      Comments: Mild distention   Musculoskeletal:         General: No swelling. Normal range of motion.      Cervical back: Normal range of motion and neck supple.      Right lower leg: Edema present.      Left lower leg: Edema present.   Skin:     General: Skin is warm and dry.      Capillary Refill: Capillary refill takes less than 2 seconds.   Neurological:      General: No focal deficit present.      Mental Status: He is alert and oriented to person, place, and time.   Psychiatric:         Mood and Affect: Mood normal.         Results Reviewed       Procedure Component Value Units Date/Time    Fingerstick Glucose (POCT) [[id number]]  (Abnormal) Collected: 01/05/25 0711    Lab Status: Final result Specimen: Blood Updated: 01/05/25 0711     POC Glucose 164 mg/dl     B-Type Natriuretic Peptide(BNP) [[id number]]  (Normal) Collected: 01/04/25 1544    Lab Status: Final result Specimen: Blood from Arm, Left Updated: 01/05/25 0540     BNP 76 pg/mL     Comprehensive metabolic panel [[id number]]  (Abnormal) Collected: 01/05/25 0420    Lab Status: Final result Specimen: Blood from Arm, Right Updated: 01/05/25 0458     Sodium 134 mmol/L      Potassium  4.0 mmol/L      Chloride 94 mmol/L      CO2 26 mmol/L      ANION GAP 14 mmol/L      BUN 28 mg/dL      Creatinine 1.69 mg/dL      Glucose 159 mg/dL      Calcium 8.2 mg/dL      AST 13 U/L      ALT 10 U/L      Alkaline Phosphatase 120 U/L      Total Protein 7.0 g/dL      Albumin 4.1 g/dL      Total Bilirubin 1.41 mg/dL      eGFR 44 ml/min/1.73sq m     Narrative:      National Kidney Disease Foundation guidelines for Chronic Kidney Disease (CKD):     Stage 1 with normal or high GFR (GFR > 90 mL/min/1.73 square meters)    Stage 2 Mild CKD (GFR = 60-89 mL/min/1.73 square meters)    Stage 3A Moderate CKD (GFR = 45-59 mL/min/1.73 square meters)    Stage 3B Moderate CKD (GFR = 30-44 mL/min/1.73 square meters)    Stage 4 Severe CKD (GFR = 15-29 mL/min/1.73 square meters)    Stage 5 End Stage CKD (GFR <15 mL/min/1.73 square meters)  Note: GFR calculation is accurate only with a steady state creatinine    Magnesium [[id number]]  (Normal) Collected: 01/05/25 0420    Lab Status: Final result Specimen: Blood from Arm, Right Updated: 01/05/25 0458     Magnesium 1.9 mg/dL     Protime-INR [[id number]]  (Normal) Collected: 01/05/25 0420    Lab Status: Final result Specimen: Blood from Arm, Right Updated: 01/05/25 0450     Protime 13.1 seconds      INR 0.96    Narrative:      INR Therapeutic Range    Indication                                             INR Range      Atrial Fibrillation                                               2.0-3.0  Hypercoagulable State                                    2.0.2.3  Left Ventricular Asist Device                            2.0-3.0  Mechanical Heart Valve                                  -    Aortic(with afib, MI, embolism, HF, LA enlargement,    and/or coagulopathy)                                     2.0-3.0 (2.5-3.5)     Mitral                                                             2.5-3.5  Prosthetic/Bioprosthetic Heart Valve               2.0-3.0  Venous thromboembolism (VTE: VT, PE         2.0-3.0    APTT [[id number]]  (Normal) Collected: 01/05/25 0420    Lab Status: Final result Specimen: Blood from Arm, Right Updated: 01/05/25 0450     PTT 29 seconds     CBC and differential [[id number]] Collected: 01/05/25 0420    Lab Status: Final result Specimen: Blood from Arm, Right Updated: 01/05/25 0437     WBC 9.20 Thousand/uL      RBC 4.32 Million/uL      Hemoglobin 12.8 g/dL      Hematocrit 38.6 %      MCV 89 fL      MCH 29.6 pg      MCHC 33.2 g/dL      RDW 14.9 %      MPV 9.6 fL      Platelets 255 Thousands/uL      nRBC 0 /100 WBCs      Segmented % 69 %      Immature Grans % 1 %      Lymphocytes % 14 %      Monocytes % 9 %      Eosinophils Relative 6 %      Basophils Relative 1 %      Absolute Neutrophils 6.37 Thousands/µL      Absolute Immature Grans 0.08 Thousand/uL      Absolute Lymphocytes 1.32 Thousands/µL      Absolute Monocytes 0.83 Thousand/µL      Eosinophils Absolute 0.52 Thousand/µL      Basophils Absolute 0.08 Thousands/µL     Fingerstick Glucose (POCT) [[id number]]  (Abnormal) Collected: 01/05/25 0026    Lab Status: Final result Specimen: Blood Updated: 01/05/25 0027     POC Glucose 187 mg/dl     HS Troponin I 2hr [[id number]]  (Normal) Collected: 01/04/25 1955    Lab Status: Final result Specimen: Blood from Arm, Left Updated: 01/04/25 2115     hs TnI 2hr 11 ng/L      Delta 2hr hsTnI -1 ng/L     HS Troponin 0hr (reflex protocol) [[id number]]  (Normal) Collected: 01/04/25 1544    Lab Status: Final result Specimen: Blood from Arm, Left Updated: 01/04/25 1947     hs TnI 0hr 12 ng/L     Comprehensive metabolic panel [[id number]]  (Abnormal) Collected: 01/04/25 1544    Lab Status: Final result Specimen: Blood from Arm, Left Updated: 01/04/25 1618     Sodium 135 mmol/L      Potassium 4.1 mmol/L      Chloride 97 mmol/L      CO2 24 mmol/L      ANION GAP 14 mmol/L      BUN 25 mg/dL      Creatinine 1.36 mg/dL      Glucose 166 mg/dL      Calcium 8.1 mg/dL      AST 15 U/L      ALT 9 U/L      Alkaline  Phosphatase 124 U/L      Total Protein 7.0 g/dL      Albumin 4.1 g/dL      Total Bilirubin 0.43 mg/dL      eGFR 57 ml/min/1.73sq m     Narrative:      National Kidney Disease Foundation guidelines for Chronic Kidney Disease (CKD):     Stage 1 with normal or high GFR (GFR > 90 mL/min/1.73 square meters)    Stage 2 Mild CKD (GFR = 60-89 mL/min/1.73 square meters)    Stage 3A Moderate CKD (GFR = 45-59 mL/min/1.73 square meters)    Stage 3B Moderate CKD (GFR = 30-44 mL/min/1.73 square meters)    Stage 4 Severe CKD (GFR = 15-29 mL/min/1.73 square meters)    Stage 5 End Stage CKD (GFR <15 mL/min/1.73 square meters)  Note: GFR calculation is accurate only with a steady state creatinine    CBC and differential [[id number]] Collected: 01/04/25 1544    Lab Status: Final result Specimen: Blood from Arm, Left Updated: 01/04/25 1600     WBC 7.18 Thousand/uL      RBC 4.51 Million/uL      Hemoglobin 13.3 g/dL      Hematocrit 40.5 %      MCV 90 fL      MCH 29.5 pg      MCHC 32.8 g/dL      RDW 14.8 %      MPV 9.5 fL      Platelets 274 Thousands/uL      nRBC 0 /100 WBCs      Segmented % 67 %      Immature Grans % 1 %      Lymphocytes % 16 %      Monocytes % 9 %      Eosinophils Relative 6 %      Basophils Relative 1 %      Absolute Neutrophils 4.83 Thousands/µL      Absolute Immature Grans 0.07 Thousand/uL      Absolute Lymphocytes 1.15 Thousands/µL      Absolute Monocytes 0.61 Thousand/µL      Eosinophils Absolute 0.46 Thousand/µL      Basophils Absolute 0.06 Thousands/µL             CT abdomen pelvis wo contrast   Final Interpretation by Ren Santana MD (01/04 1951)      No evidence of bowel obstruction, colitis or diverticulitis.      Workstation performed: [id number]         X-ray chest 2 views   Final Interpretation by Carrie Marie MD (01/04 1828)      No acute cardiopulmonary disease.               Workstation performed: [id number]             Procedures    ED Medication and Procedure Management   Prior to  Admission Medications   Prescriptions Last Dose Informant Patient Reported? Taking?   Accu-Chek FastClix Lancets MISC  Self No No   Sig: Test blood sugar twice daily   Patient taking differently: Takes blood sugar three times a week provider aware   Blood Glucose Monitoring Suppl (Accu-Chek Guide) w/Device KIT  Self No No   Sig: Use 2 (two) times a day Test blood sugar twice daily   Empagliflozin (JARDIANCE) 10 MG TABS tablet  Self No No   Sig: Take 1 tablet (10 mg total) by mouth daily   acetaminophen (TYLENOL) 325 mg tablet  Self No No   Sig: Take 2 tablets (650 mg total) by mouth every 6 (six) hours as needed for mild pain, headaches or fever   albuterol (PROVENTIL HFA,VENTOLIN HFA) 90 mcg/act inhaler  Self No No   Sig: INHALE 2 PUFFS EVERY 4 HOURS AS NEEDED FOR WHEEZING OR SHORTNESS OF BREATH   aluminum-magnesium hydroxide 200-200 MG/5ML suspension  Self No No   Sig: Take 5 mL by mouth every 6 (six) hours as needed for heartburn   apixaban (Eliquis) 5 mg   No No   Sig: Take 1 tablet (5 mg total) by mouth 2 (two) times a day   atorvastatin (LIPITOR) 10 mg tablet  Self No No   Sig: TAKE 1 TABLET BY MOUTH EVERY DAY   budesonide-formoterol (Symbicort) 160-4.5 mcg/act inhaler  Self No No   Sig: Inhale 2 puffs 2 (two) times a day Rinse mouth after use.   bumetanide (BUMEX) 2 mg tablet  Self No No   Sig: Take 3 tablets (6 mg total) by mouth 3 (three) times a day   cyanocobalamin (VITAMIN B-12) 1000 MCG tablet  Self No No   Sig: Take 1 tablet (1,000 mcg total) by mouth daily   famotidine (PEPCID) 20 mg tablet  Self No No   Sig: Take 1 tablet (20 mg total) by mouth if needed for heartburn As needed twice a day   fluticasone (FLONASE) 50 mcg/act nasal spray  Self No No   Si spray into each nostril 2 (two) times a day   loratadine (CLARITIN) 10 mg tablet  Self No No   Sig: Take 1 tablet (10 mg total) by mouth if needed for allergies As needed daily   metolazone (ZAROXOLYN) 2.5 mg tablet   No No   Sig: Take 2 tablets (5  mg total) by mouth 3 (three) times a week Take 20-30 minutes before Bumex dose and with additional potassium   metoprolol succinate (TOPROL-XL) 50 mg 24 hr tablet   No No   Sig: TAKE 1 TABLET BY MOUTH EVERY DAY   montelukast (SINGULAIR) 10 mg tablet  Self No No   Sig: Take 1 tablet (10 mg total) by mouth daily at bedtime   nitroglycerin (NITROSTAT) 0.4 mg SL tablet  Self No No   Sig: Place 1 tablet (0.4 mg total) under the tongue every 5 (five) minutes as needed for chest pain for up to 50 doses   potassium chloride (Klor-Con M20) 20 mEq tablet  Self No No   Sig: Take 2 tablets (40 mEq total) by mouth 3 (three) times a day   pregabalin (LYRICA) 50 mg capsule  Self No No   Sig: Take 1 caps. at bedtime   sitaGLIPtin (Januvia) 100 mg tablet  Self No No   Sig: TAKE 1/2 TABLET BY MOUTH EVERY DAY   sodium chloride (OCEAN) 0.65 % nasal spray   No No   Si spray into each nostril every hour as needed for congestion   spironolactone (ALDACTONE) 25 mg tablet   No No   Sig: Take 1 tablet (25 mg total) by mouth daily   tiotropium (Spiriva Respimat) 1.25 MCG/ACT AERS inhaler  Self No No   Sig: Inhale 2 puffs daily      Facility-Administered Medications: None     Current Discharge Medication List        CONTINUE these medications which have NOT CHANGED    Details   Accu-Chek FastClix Lancets MISC Test blood sugar twice daily  Qty: 200 each, Refills: 3    Associated Diagnoses: Type 2 diabetes mellitus with hyperglycemia, without long-term current use of insulin ([facility])      acetaminophen (TYLENOL) 325 mg tablet Take 2 tablets (650 mg total) by mouth every 6 (six) hours as needed for mild pain, headaches or fever    Associated Diagnoses: Acute gallstone pancreatitis; Choledocholithiasis      albuterol (PROVENTIL HFA,VENTOLIN HFA) 90 mcg/act inhaler INHALE 2 PUFFS EVERY 4 HOURS AS NEEDED FOR WHEEZING OR SHORTNESS OF BREATH  Qty: 18 g, Refills: 5    Comments: Substitution to a formulary equivalent within the same pharmaceutical  class is authorized.  Associated Diagnoses: Mild intermittent asthma without complication      aluminum-magnesium hydroxide 200-200 MG/5ML suspension Take 5 mL by mouth every 6 (six) hours as needed for heartburn  Qty: 355 mL, Refills: 0    Associated Diagnoses: Chest pain      apixaban (Eliquis) 5 mg Take 1 tablet (5 mg total) by mouth 2 (two) times a day  Qty: 180 tablet, Refills: 0    Associated Diagnoses: A-fib ([facility]); Atrial fibrillation, unspecified type ([facility])      atorvastatin (LIPITOR) 10 mg tablet TAKE 1 TABLET BY MOUTH EVERY DAY  Qty: 90 tablet, Refills: 3    Associated Diagnoses: Mixed hyperlipidemia      Blood Glucose Monitoring Suppl (Accu-Chek Guide) w/Device KIT Use 2 (two) times a day Test blood sugar twice daily  Qty: 1 kit, Refills: 0    Associated Diagnoses: Type 2 diabetes mellitus with hyperglycemia, without long-term current use of insulin ([facility])      budesonide-formoterol (Symbicort) 160-4.5 mcg/act inhaler Inhale 2 puffs 2 (two) times a day Rinse mouth after use.  Qty: 30.6 g, Refills: 2    Associated Diagnoses: Moderate persistent asthma with acute exacerbation      bumetanide (BUMEX) 2 mg tablet Take 3 tablets (6 mg total) by mouth 3 (three) times a day  Qty: 270 tablet, Refills: 0    Associated Diagnoses: Chronic heart failure with preserved ejection fraction ([facility])      cyanocobalamin (VITAMIN B-12) 1000 MCG tablet Take 1 tablet (1,000 mcg total) by mouth daily  Qty: 30 tablet, Refills: 0    Associated Diagnoses: Vitamin B12 deficiency      Empagliflozin (JARDIANCE) 10 MG TABS tablet Take 1 tablet (10 mg total) by mouth daily  Qty: 30 tablet, Refills: 11    Associated Diagnoses: Acute on chronic right-sided heart failure ([facility]); Type 2 diabetes mellitus with stage 3b chronic kidney disease, without long-term current use of insulin ([facility])      famotidine (PEPCID) 20 mg tablet Take 1 tablet (20 mg total) by mouth if needed for heartburn As needed twice a day    Associated Diagnoses: Chest pain       fluticasone (FLONASE) 50 mcg/act nasal spray 1 spray into each nostril 2 (two) times a day  Refills: 0    Associated Diagnoses: Nasal congestion      loratadine (CLARITIN) 10 mg tablet Take 1 tablet (10 mg total) by mouth if needed for allergies As needed daily    Associated Diagnoses: Nasal congestion      metolazone (ZAROXOLYN) 2.5 mg tablet Take 2 tablets (5 mg total) by mouth 3 (three) times a week Take 20-30 minutes before Bumex dose and with additional potassium  Qty: 180 tablet, Refills: 0    Associated Diagnoses: Chronic heart failure with preserved ejection fraction (HCC)      metoprolol succinate (TOPROL-XL) 50 mg 24 hr tablet TAKE 1 TABLET BY MOUTH EVERY DAY  Qty: 90 tablet, Refills: 1    Associated Diagnoses: Chronic heart failure with preserved ejection fraction (HCC)      montelukast (SINGULAIR) 10 mg tablet Take 1 tablet (10 mg total) by mouth daily at bedtime  Qty: 90 tablet, Refills: 1    Associated Diagnoses: Moderate persistent asthma with acute exacerbation      nitroglycerin (NITROSTAT) 0.4 mg SL tablet Place 1 tablet (0.4 mg total) under the tongue every 5 (five) minutes as needed for chest pain for up to 50 doses  Qty: 50 tablet, Refills: 0    Associated Diagnoses: Chest pain      potassium chloride (Klor-Con M20) 20 mEq tablet Take 2 tablets (40 mEq total) by mouth 3 (three) times a day  Qty: 180 tablet, Refills: 0    Associated Diagnoses: Diuretic-induced hypokalemia      pregabalin (LYRICA) 50 mg capsule Take 1 caps. at bedtime  Qty: 30 capsule, Refills: 5    Associated Diagnoses: Diabetic polyneuropathy associated with type 2 diabetes mellitus (HCC)      sitaGLIPtin (Januvia) 100 mg tablet TAKE 1/2 TABLET BY MOUTH EVERY DAY  Qty: 15 tablet, Refills: 5    Associated Diagnoses: Type 2 diabetes mellitus with stage 3b chronic kidney disease, without long-term current use of insulin ([facility])      sodium chloride (OCEAN) 0.65 % nasal spray 1 spray into each nostril every hour as needed for  congestion  Qty: 37.5 mL, Refills: 0    Associated Diagnoses: Acute on chronic diastolic congestive heart failure (HCC)      spironolactone (ALDACTONE) 25 mg tablet Take 1 tablet (25 mg total) by mouth daily  Qty: 30 tablet, Refills: 0    Associated Diagnoses: Acute on chronic diastolic congestive heart failure (HCC)      tiotropium (Spiriva Respimat) 1.25 MCG/ACT AERS inhaler Inhale 2 puffs daily  Qty: 4 g, Refills: 0    Associated Diagnoses: Moderate persistent asthma with acute exacerbation           No discharge procedures on file.  ED SEPSIS DOCUMENTATION   Time reflects when diagnosis was documented in both MDM as applicable and the Disposition within this note       Time User Action Codes Description Comment    1/4/2025  9:04 PM Daquan Bedner Add [I50.9] CHF (congestive heart failure) ([facility])     1/4/2025  9:38 PM Justino Prasad Add [I50.43] Acute on chronic combined systolic and diastolic heart failure, NYHA class 3 ([facility])                  Daquan Bender MD  01/06/25 1553       Daquan Bender MD  01/07/25 1310

## 2025-01-06 NOTE — UTILIZATION REVIEW
NOTIFICATION OF INPATIENT ADMISSION   AUTHORIZATION REQUEST   SERVICING FACILITY:   [facility]  Address: [address] [place] [address]  Tax ID: [id number]  NPI: [id number] ATTENDING PROVIDER:  Attending Name and NPI#: Bruce Barth Md [[id number]]  Address: [address] [place] [address]  Phone: [phone]   ADMISSION INFORMATION:  Place of Service: Inpatient [facility]  Place of Service Code: 21  Inpatient Admission Date/Time: 1/4/25  8:42 PM  Discharge Date/Time: No discharge date for patient encounter.  Admitting Diagnosis Code/Description:  Shortness of breath [R06.02]  CHF (congestive heart failure) ([facility]) [I50.9]  Acute on chronic combined systolic and diastolic heart failure, NYHA class 3 (HCC) [I50.43]     UTILIZATION REVIEW CONTACT:  Bayron Candelaria Utilization   Network Utilization Review Department  Phone: [phone]  Fax: [phone]  Email: [email or web address]  Contact for approvals/pending authorizations, clinical reviews, and discharge.     PHYSICIAN ADVISORY SERVICES:  Medical Necessity Denial & Vceh-gt-Fzvn Review  Phone: [phone]  Fax: [phone]  Email: [email or web address]     DISCHARGE SUPPORT TEAM:  For Patients Discharge Needs & Updates  Phone: [phone] opt. 2 Fax: [phone]  Email: [email or web address]

## 2025-01-06 NOTE — ASSESSMENT & PLAN NOTE
Presented 1/4 with weight gain 7+ rounds, edema, worsening dyspnea on exertion.  No clear trigger  June 2024 echo with 55% EF  Home diuretics: Bumex 6 mg 3 times daily, metolazone 5 mg 3 times weekly  BNP 78, previously 250s to 400s  Weight now back at 315 pounds, patient not quite feeling back to baseline  1200 cc urine, net -700 cc  Telemetry with no events overnight    Plan  Discussed with attending   Continue Bumex infusion at 1 mg/h until this evening  Fluid restriction 1800 cc, 2 g sodium diet  Continue home meds: Metoprolol XL 50 mg daily, Aldactone 25 mg daily, Jardiance 10 mg daily

## 2025-01-06 NOTE — PLAN OF CARE
Problem: Potential for Falls  Goal: Patient will remain free of falls  Description: INTERVENTIONS:  - Educate patient/family on patient safety including physical limitations  - Instruct patient to call for assistance with activity   - Consult OT/PT to assist with strengthening/mobility   - Keep Call bell within reach  - Keep bed low and locked with side rails adjusted as appropriate  - Keep care items and personal belongings within reach  - Initiate and maintain comfort rounds  - Make Fall Risk Sign visible to staff  \  - Apply yellow socks and bracelet for high fall risk patients  - Consider moving patient to room near nurses station  Outcome: Progressing     Problem: PAIN - ADULT  Goal: Verbalizes/displays adequate comfort level or baseline comfort level  Description: Interventions:  - Encourage patient to monitor pain and request assistance  - Assess pain using appropriate pain scale  - Administer analgesics based on type and severity of pain and evaluate response  - Implement non-pharmacological measures as appropriate and evaluate response  - Consider cultural and social influences on pain and pain management  - Notify physician/advanced practitioner if interventions unsuccessful or patient reports new pain  Outcome: Progressing     Problem: SAFETY ADULT  Goal: Patient will remain free of falls  Description: INTERVENTIONS:  - Educate patient/family on patient safety including physical limitations  - Instruct patient to call for assistance with activity   - Consult OT/PT to assist with strengthening/mobility   - Keep Call bell within reach  - Keep bed low and locked with side rails adjusted as appropriate  - Keep care items and personal belongings within reach  - Initiate and maintain comfort rounds    - Apply yellow socks and bracelet for high fall risk patients  - Consider moving patient to room near nurses station  Outcome: Progressing

## 2025-01-06 NOTE — PLAN OF CARE
Problem: PAIN - ADULT  Goal: Verbalizes/displays adequate comfort level or baseline comfort level  Description: Interventions:  - Encourage patient to monitor pain and request assistance  - Assess pain using appropriate pain scale  - Administer analgesics based on type and severity of pain and evaluate response  - Implement non-pharmacological measures as appropriate and evaluate response  - Consider cultural and social influences on pain and pain management  - Notify physician/advanced practitioner if interventions unsuccessful or patient reports new pain  Reactivated     Problem: INFECTION - ADULT  Goal: Absence or prevention of progression during hospitalization  Description: INTERVENTIONS:  - Assess and monitor for signs and symptoms of infection  - Monitor lab/diagnostic results  - Monitor all insertion sites, i.e. indwelling lines, tubes, and drains  - Monitor endotracheal if appropriate and nasal secretions for changes in amount and color  - Mission appropriate cooling/warming therapies per order  - Administer medications as ordered  - Instruct and encourage patient and family to use good hand hygiene technique  - Identify and instruct in appropriate isolation precautions for identified infection/condition  Reactivated  Goal: Absence of fever/infection during neutropenic period  Description: INTERVENTIONS:  - Monitor WBC    Reactivated     Problem: Potential for Falls  Goal: Patient will remain free of falls  Description: INTERVENTIONS:  - Educate patient/family on patient safety including physical limitations  - Instruct patient to call for assistance with activity   - Consult OT/PT to assist with strengthening/mobility   - Keep Call bell within reach  - Keep bed low and locked with side rails adjusted as appropriate  - Keep care items and personal belongings within reach  - Initiate and maintain comfort rounds  - Make Fall Risk Sign visible to staff  - Offer Toileting every 2 Hours, in advance of  need  - Initiate/Maintain bed/chair alarm  - Obtain necessary fall risk management equipment: slippers  - Apply yellow socks and bracelet for high fall risk patients  - Consider moving patient to room near nurses station  Outcome: Progressing     Problem: DISCHARGE PLANNING  Goal: Discharge to home or other facility with appropriate resources  Description: INTERVENTIONS:  - Identify barriers to discharge w/patient and caregiver  - Arrange for needed discharge resources and transportation as appropriate  - Identify discharge learning needs (meds, wound care, etc.)  - Arrange for interpretive services to assist at discharge as needed  - Refer to Case Management Department for coordinating discharge planning if the patient needs post-hospital services based on physician/advanced practitioner order or complex needs related to functional status, cognitive ability, or social support system  Reactivated     Problem: Knowledge Deficit  Goal: Patient/family/caregiver demonstrates understanding of disease process, treatment plan, medications, and discharge instructions  Description: Complete learning assessment and assess knowledge base.  Interventions:  - Provide teaching at level of understanding  - Provide teaching via preferred learning methods  Reactivated

## 2025-01-06 NOTE — ASSESSMENT & PLAN NOTE
Lab Results   Component Value Date    HGBA1C 7.0 (H) 12/04/2024       Recent Labs     01/05/25  1605 01/05/25  2124 01/06/25  0808 01/06/25  1127   POCGLU 169* 154* 179* 201*       Blood Sugar Average: Last 72 hrs:  (P) 177.875  Outpatient regimen: sitagliptin, Jardiance (for CHF) - currently on hold   SSI with meals while hospitalized  Hypoglycemic protocol

## 2025-01-06 NOTE — ASSESSMENT & PLAN NOTE
Lab Results   Component Value Date    EGFR 39 01/06/2025    EGFR 44 01/05/2025    EGFR 57 01/04/2025    CREATININE 1.86 (H) 01/06/2025    CREATININE 1.69 (H) 01/05/2025    CREATININE 1.36 (H) 01/04/2025   Creat BL 1.5-2.0  Monitor creat while on bumex gtt  Avoid hypotension and nephrotoxic drugs   Check am cmp

## 2025-01-06 NOTE — RESPIRATORY THERAPY NOTE
01/05/25 3646   Respiratory Assessment   Resp Comments Pt does wear CPAP at home but does not have a machine at this time because it was recalled. He does not want to wear our machine this admission, will d/c the order.

## 2025-01-07 LAB
ANION GAP SERPL CALCULATED.3IONS-SCNC: 12 MMOL/L (ref 4–13)
BUN SERPL-MCNC: 37 MG/DL (ref 5–25)
CALCIUM SERPL-MCNC: 7.6 MG/DL (ref 8.4–10.2)
CHLORIDE SERPL-SCNC: 94 MMOL/L (ref 96–108)
CO2 SERPL-SCNC: 30 MMOL/L (ref 21–32)
CREAT SERPL-MCNC: 2.07 MG/DL (ref 0.6–1.3)
GFR SERPL CREATININE-BSD FRML MDRD: 34 ML/MIN/1.73SQ M
GLUCOSE SERPL-MCNC: 166 MG/DL (ref 65–140)
GLUCOSE SERPL-MCNC: 174 MG/DL (ref 65–140)
GLUCOSE SERPL-MCNC: 187 MG/DL (ref 65–140)
GLUCOSE SERPL-MCNC: 200 MG/DL (ref 65–140)
GLUCOSE SERPL-MCNC: 203 MG/DL (ref 65–140)
MAGNESIUM SERPL-MCNC: 2.1 MG/DL (ref 1.9–2.7)
POTASSIUM SERPL-SCNC: 3.6 MMOL/L (ref 3.5–5.3)
SODIUM SERPL-SCNC: 136 MMOL/L (ref 135–147)

## 2025-01-07 PROCEDURE — 99232 SBSQ HOSP IP/OBS MODERATE 35: CPT | Performed by: PHYSICIAN ASSISTANT

## 2025-01-07 PROCEDURE — 99232 SBSQ HOSP IP/OBS MODERATE 35: CPT | Performed by: INTERNAL MEDICINE

## 2025-01-07 PROCEDURE — 80048 BASIC METABOLIC PNL TOTAL CA: CPT | Performed by: INTERNAL MEDICINE

## 2025-01-07 PROCEDURE — 82948 REAGENT STRIP/BLOOD GLUCOSE: CPT

## 2025-01-07 PROCEDURE — 83735 ASSAY OF MAGNESIUM: CPT | Performed by: INTERNAL MEDICINE

## 2025-01-07 RX ORDER — BUMETANIDE 2 MG/1
6 TABLET ORAL 3 TIMES DAILY
Status: DISCONTINUED | OUTPATIENT
Start: 2025-01-07 | End: 2025-01-09 | Stop reason: HOSPADM

## 2025-01-07 RX ORDER — INSULIN GLARGINE 100 [IU]/ML
10 INJECTION, SOLUTION SUBCUTANEOUS
Status: DISCONTINUED | OUTPATIENT
Start: 2025-01-07 | End: 2025-01-09 | Stop reason: HOSPADM

## 2025-01-07 RX ORDER — POTASSIUM CHLORIDE 1500 MG/1
40 TABLET, EXTENDED RELEASE ORAL ONCE
Status: COMPLETED | OUTPATIENT
Start: 2025-01-07 | End: 2025-01-07

## 2025-01-07 RX ADMIN — APIXABAN 5 MG: 5 TABLET, FILM COATED ORAL at 09:21

## 2025-01-07 RX ADMIN — FLUTICASONE PROPIONATE 1 SPRAY: 50 SPRAY, METERED NASAL at 17:17

## 2025-01-07 RX ADMIN — PREGABALIN 50 MG: 50 CAPSULE ORAL at 09:21

## 2025-01-07 RX ADMIN — BUDESONIDE AND FORMOTEROL FUMARATE DIHYDRATE 2 PUFF: 160; 4.5 AEROSOL RESPIRATORY (INHALATION) at 17:17

## 2025-01-07 RX ADMIN — LORATADINE 10 MG: 10 TABLET ORAL at 09:21

## 2025-01-07 RX ADMIN — INSULIN LISPRO 1 UNITS: 100 INJECTION, SOLUTION INTRAVENOUS; SUBCUTANEOUS at 09:23

## 2025-01-07 RX ADMIN — Medication 250 MG: at 17:17

## 2025-01-07 RX ADMIN — ATORVASTATIN CALCIUM 10 MG: 10 TABLET, FILM COATED ORAL at 09:21

## 2025-01-07 RX ADMIN — UMECLIDINIUM 1 PUFF: 62.5 AEROSOL, POWDER ORAL at 09:21

## 2025-01-07 RX ADMIN — INSULIN LISPRO 1 UNITS: 100 INJECTION, SOLUTION INTRAVENOUS; SUBCUTANEOUS at 15:59

## 2025-01-07 RX ADMIN — BUDESONIDE AND FORMOTEROL FUMARATE DIHYDRATE 2 PUFF: 160; 4.5 AEROSOL RESPIRATORY (INHALATION) at 09:21

## 2025-01-07 RX ADMIN — FLUTICASONE PROPIONATE 1 SPRAY: 50 SPRAY, METERED NASAL at 09:22

## 2025-01-07 RX ADMIN — FAMOTIDINE 20 MG: 20 TABLET, FILM COATED ORAL at 09:21

## 2025-01-07 RX ADMIN — INSULIN LISPRO 1 UNITS: 100 INJECTION, SOLUTION INTRAVENOUS; SUBCUTANEOUS at 21:28

## 2025-01-07 RX ADMIN — APIXABAN 5 MG: 5 TABLET, FILM COATED ORAL at 17:17

## 2025-01-07 RX ADMIN — CYANOCOBALAMIN TAB 500 MCG 1000 MCG: 500 TAB at 09:21

## 2025-01-07 RX ADMIN — POTASSIUM CHLORIDE 40 MEQ: 1500 TABLET, EXTENDED RELEASE ORAL at 09:23

## 2025-01-07 RX ADMIN — SPIRONOLACTONE 25 MG: 25 TABLET, FILM COATED ORAL at 09:21

## 2025-01-07 RX ADMIN — Medication 250 MG: at 09:21

## 2025-01-07 RX ADMIN — BUMETANIDE 6 MG: 2 TABLET ORAL at 21:27

## 2025-01-07 RX ADMIN — METOPROLOL SUCCINATE 50 MG: 50 TABLET, EXTENDED RELEASE ORAL at 09:21

## 2025-01-07 RX ADMIN — INSULIN LISPRO 1 UNITS: 100 INJECTION, SOLUTION INTRAVENOUS; SUBCUTANEOUS at 13:33

## 2025-01-07 RX ADMIN — MONTELUKAST 10 MG: 10 TABLET, FILM COATED ORAL at 21:28

## 2025-01-07 RX ADMIN — EMPAGLIFLOZIN 10 MG: 10 TABLET, FILM COATED ORAL at 09:22

## 2025-01-07 RX ADMIN — BUMETANIDE 6 MG: 2 TABLET ORAL at 15:59

## 2025-01-07 RX ADMIN — INSULIN GLARGINE 10 UNITS: 100 INJECTION, SOLUTION SUBCUTANEOUS at 21:28

## 2025-01-07 NOTE — ASSESSMENT & PLAN NOTE
Encouraged CPAP use  Going to see sleep medicine soon  Encouraged calling and making an appointment for soon after discharge

## 2025-01-07 NOTE — ASSESSMENT & PLAN NOTE
Lab Results   Component Value Date    EGFR 34 01/07/2025    EGFR 39 01/06/2025    EGFR 44 01/05/2025    CREATININE 2.07 (H) 01/07/2025    CREATININE 1.86 (H) 01/06/2025    CREATININE 1.69 (H) 01/05/2025   Creat BL 1.5-2.0  Monitor creat with diuresis, trended up with bumex gtt now transitioned to PO diuretic on 1/7.    Avoid hypotension and nephrotoxic drugs   Check am bmp

## 2025-01-07 NOTE — PROGRESS NOTES
Progress Note - Heart Failure   Name: Mesfin Cano 59 y.o. male I MRN: [id number]  Unit/Bed#: -01 I Date of Admission: 1/4/2025   Date of Service: 1/7/2025 I Hospital Day: 3    Assessment & Plan  Acute on chronic combined systolic and diastolic heart failure, NYHA class 3 (HCC)  Presented 1/4 with weight gain 7+ rounds, edema, worsening dyspnea on exertion.  No clear trigger  June 2024 echo with 55% EF  Home diuretics: Bumex 6 mg 3 times daily, metolazone 5 mg 3 times weekly  BNP 78, previously 250s to 400s  Weight now back at 315 pounds, patient not quite feeling back to baseline  3100 cc urine, net -2.9L   Telemetry with no events overnight.  Creatinine slightly above baseline at 2.07 (upper limit of baseline 2.0)    Plan  Discussed with attending  Resume home bumex regimen today - 6mg TID  Hold home metolazone for now  Fluid restriction 1800 cc, 2 g sodium diet  Continue home meds: Metoprolol XL 50 mg daily, Aldactone 25 mg daily, Jardiance 10 mg daily  Should have short term follow up in cardiology office - sees Dr. Wilcox  Primary hypertension  Continue meds as above  VICKY (obstructive sleep apnea)  Encouraged CPAP use  Going to see sleep medicine soon  Encouraged calling and making an appointment for soon after discharge  Paroxysmal atrial fibrillation (HCC)  Maintained on Eliquis  Stage 3b chronic kidney disease (HCC)  Lab Results   Component Value Date    EGFR 34 01/07/2025    EGFR 39 01/06/2025    EGFR 44 01/05/2025    CREATININE 2.07 (H) 01/07/2025    CREATININE 1.86 (H) 01/06/2025    CREATININE 1.69 (H) 01/05/2025     Baseline creatinine appears to be anywhere from 1.4-2, still within baseline    Subjective     Patient feeling much better than admission, still thinks he is not quite back to baseline but understands his renal function is slightly above baseline at this point as we have likely reached a good dry weight.    Objective :  Temp:  [97.3 °F (36.3 °C)-98.6 °F (37 °C)] 97.3 °F (36.3 °C)  HR:   [71-86] 80  BP: (134-145)/(73-92) 135/92  Resp:  [16-18] 18  SpO2:  [94 %-98 %] 98 %  O2 Device: None (Room air)  Orthostatic Blood Pressures      Flowsheet Row Most Recent Value   Blood Pressure 135/92 filed at 01/07/2025 0731          First Weight: Weight - Scale: (!) 147 kg (325 lb) (01/04/25 2013)  Vitals:    01/06/25 0600 01/07/25 0556   Weight: (!) 143 kg (315 lb 11.2 oz) (!) 143 kg (316 lb 5.8 oz)     Physical Exam  Constitutional:       General: He is not in acute distress.     Appearance: He is obese.   HENT:      Mouth/Throat:      Mouth: Mucous membranes are moist.      Pharynx: Oropharynx is clear.   Neck:      Comments: No JVD  Cardiovascular:      Rate and Rhythm: Normal rate and regular rhythm.   Pulmonary:      Effort: Pulmonary effort is normal.      Breath sounds: Normal breath sounds.   Abdominal:      Palpations: Abdomen is soft.   Musculoskeletal:      Right lower leg: No edema.      Left lower leg: No edema.   Neurological:      Mental Status: He is alert.           Lab Results: I have reviewed the following results:  Results from last 7 days   Lab Units 01/05/25  0420 01/04/25  1544   WBC Thousand/uL 9.20 7.18   HEMOGLOBIN g/dL 12.8 13.3   HEMATOCRIT % 38.6 40.5   PLATELETS Thousands/uL 255 274     Results from last 7 days   Lab Units 01/07/25  0556 01/06/25  0514 01/05/25  0420   POTASSIUM mmol/L 3.6 3.2* 4.0   CHLORIDE mmol/L 94* 94* 94*   CO2 mmol/L 30 29 26   BUN mg/dL 37* 31* 28*   CREATININE mg/dL 2.07* 1.86* 1.69*   CALCIUM mg/dL 7.6* 8.2* 8.2*     Results from last 7 days   Lab Units 01/05/25  0420   INR  0.96   PTT seconds 29     Lab Results   Component Value Date    HGBA1C 7.0 (H) 12/04/2024     Lab Results   Component Value Date    TROPONINI <0.02 09/22/2021             VTE Pharmacologic Prophylaxis: VTE covered by:  apixaban, Oral, 5 mg at 01/06/25 1743     VTE Mechanical Prophylaxis: sequential compression device

## 2025-01-07 NOTE — ASSESSMENT & PLAN NOTE
Lab Results   Component Value Date    EGFR 34 01/07/2025    EGFR 39 01/06/2025    EGFR 44 01/05/2025    CREATININE 2.07 (H) 01/07/2025    CREATININE 1.86 (H) 01/06/2025    CREATININE 1.69 (H) 01/05/2025     Baseline creatinine appears to be anywhere from 1.4-2, still within baseline

## 2025-01-07 NOTE — INCIDENTAL FINDINGS
The following findings require follow up:  Radiographic finding   Finding: CT a/p with 0.6 cm lesion of left lobe liver, may represent cyst    Follow up required: yes   Follow up should be done within 1 month(s)    Please notify the following clinician to assist with the follow up:   PCP    Incidental finding results were discussed with the Patient by Enedina Gray PA-C on 01/08/25.   They expressed understanding and all questions answered.

## 2025-01-07 NOTE — PROGRESS NOTES
Progress Note - Hospitalist   Name: Mesfin Cano 59 y.o. male I MRN: [id number]  Unit/Bed#: -01 I Date of Admission: 1/4/2025   Date of Service: 1/7/2025 I Hospital Day: 3    Assessment & Plan  Acute on chronic combined systolic and diastolic heart failure, NYHA class 3 (HCC)  Wt Readings from Last 3 Encounters:   01/07/25 (!) 143 kg (316 lb 5.8 oz)   12/04/24 (!) 147 kg (323 lb 6.4 oz)   11/28/24 (!) 143 kg (314 lb 9.6 oz)   EF 55%, G2DD, no significant valvular abnormalities  Presents with 7 lb weight gain, worsening LE edema, abdominal distention and SOB  Outpatient diuretic regimen: Bumex 6 mg tid + Metolazone 3x weekly  BNP 76 on admission   Initially on Bumex drip, this has now been discontinued as of 1/6 evening.  Now on p.o. Bumex 6 mg 3 times daily per heart failure  Monitor on telemetry   Fluid restriction 1800 cc/ and 2 gram sodium diet   HF appreciated   Monitor I/Os, daily weights   Continue Aldactone  Primary hypertension  Continue metoprolol 50mg daily, monitor BP while on bumex drip  VICKY (obstructive sleep apnea)  C/w CPAP  Paroxysmal atrial fibrillation (HCC)  Continue metoprolol XL 50 mg qd  Eliquis for AC  Uncontrolled type 2 diabetes mellitus with hyperglycemia (HCC)  Lab Results   Component Value Date    HGBA1C 7.0 (H) 12/04/2024       Recent Labs     01/06/25  1721 01/06/25  2111 01/07/25  0729 01/07/25  1157   POCGLU 188* 239* 174* 200*       Blood Sugar Average: Last 72 hrs:  (P) 185.5385077096186033  Outpatient regimen: sitagliptin, Jardiance (for CHF) - currently on hold   SSI with meals while hospitalized  Add Lantus QHS with persistent hyperglycemia   Hypoglycemic protocol    Morbid obesity (HCC)  BMI 42.88   Encourage diet and weight loss   Stage 3b chronic kidney disease (HCC)  Lab Results   Component Value Date    EGFR 34 01/07/2025    EGFR 39 01/06/2025    EGFR 44 01/05/2025    CREATININE 2.07 (H) 01/07/2025    CREATININE 1.86 (H) 01/06/2025    CREATININE 1.69 (H) 01/05/2025    Creat BL 1.5-2.0  Monitor creat with diuresis, trended up with bumex gtt now transitioned to PO diuretic on 1/7.    Avoid hypotension and nephrotoxic drugs   Check am bmp  Serum total bilirubin elevated  T bili level on admission 1.41- monitor   Initially reported abd pain on admission and loose stools  CT A/P negative   Elevation likely in the setting of CHF exacerbation   Repeat am cmp   Monitor stools- consider cdiff testing with recent admission if diarrhea occurs, RN to monitor stools     VTE Pharmacologic Prophylaxis: VTE Score: 4 Moderate Risk (Score 3-4) - Pharmacological DVT Prophylaxis Ordered: apixaban (Eliquis).    Mobility:   Basic Mobility Inpatient Raw Score: 24  JH-HLM Goal: 8: Walk 250 feet or more  JH-HLM Achieved: 8: Walk 250 feet ot more  JH-HLM Goal achieved. Continue to encourage appropriate mobility.    Patient Centered Rounds: I performed bedside rounds with nursing staff today.   Discussions with Specialists or Other Care Team Provider:     Education and Discussions with Family / Patient: Patient declined call to .     Current Length of Stay: 3 day(s)  Current Patient Status: Inpatient   Certification Statement: The patient will continue to require additional inpatient hospital stay due to CHF exacerbation  Discharge Plan: Anticipate discharge in 24-48 hrs to home.    Code Status: Level 1 - Full Code    Subjective   No complaints.      Objective :  Temp:  [97.3 °F (36.3 °C)-98.6 °F (37 °C)] 98.1 °F (36.7 °C)  HR:  [70-86] 70  BP: (125-145)/(73-92) 125/73  Resp:  [16-18] 18  SpO2:  [94 %-98 %] 95 %  O2 Device: None (Room air)    Body mass index is 41.74 kg/m².     Input and Output Summary (last 24 hours):     Intake/Output Summary (Last 24 hours) at 1/7/2025 1159  Last data filed at 1/7/2025 0900  Gross per 24 hour   Intake 480 ml   Output 2625 ml   Net -2145 ml       Physical Exam  Vitals reviewed.   Constitutional:       General: He is not in acute distress.     Appearance:  He is not toxic-appearing.   HENT:      Head: Normocephalic and atraumatic.   Eyes:      Extraocular Movements: Extraocular movements intact.   Cardiovascular:      Rate and Rhythm: Normal rate and regular rhythm.   Pulmonary:      Effort: Pulmonary effort is normal. No respiratory distress.      Breath sounds: No wheezing or rales.   Abdominal:      General: Bowel sounds are normal.      Palpations: Abdomen is soft.   Musculoskeletal:         General: Normal range of motion.   Neurological:      General: No focal deficit present.      Mental Status: He is alert and oriented to person, place, and time.   Psychiatric:         Mood and Affect: Mood normal.         Behavior: Behavior normal.         Thought Content: Thought content normal.         Lines/Drains:        Telemetry:  Telemetry Orders (From admission, onward)               24 Hour Telemetry Monitoring  Continuous x 24 Hours (Telem)        Expiring   Question:  Reason for 24 Hour Telemetry  Answer:  Decompensated CHF- and any one of the following: continuous diuretic infusion or total diuretic dose >200 mg daily, associated electrolyte derangement (I.e. K < 3.0), inotropic drip (continuous infusion), hx of ventricular arrhythmia, or new EF < 35%                                    Lab Results: I have reviewed the following results:   Results from last 7 days   Lab Units 01/05/25  0420   WBC Thousand/uL 9.20   HEMOGLOBIN g/dL 12.8   HEMATOCRIT % 38.6   PLATELETS Thousands/uL 255   SEGS PCT % 69   LYMPHO PCT % 14   MONO PCT % 9   EOS PCT % 6     Results from last 7 days   Lab Units 01/07/25  0556 01/06/25  0514   SODIUM mmol/L 136 136   POTASSIUM mmol/L 3.6 3.2*   CHLORIDE mmol/L 94* 94*   CO2 mmol/L 30 29   BUN mg/dL 37* 31*   CREATININE mg/dL 2.07* 1.86*   ANION GAP mmol/L 12 13   CALCIUM mg/dL 7.6* 8.2*   ALBUMIN g/dL  --  3.8   TOTAL BILIRUBIN mg/dL  --  1.04*   ALK PHOS U/L  --  113*   ALT U/L  --  7   AST U/L  --  11*   GLUCOSE RANDOM mg/dL 166* 169*      Results from last 7 days   Lab Units 01/05/25  0420   INR  0.96     Results from last 7 days   Lab Units 01/07/25  1157 01/07/25  0729 01/06/25  2111 01/06/25  1721 01/06/25  1127 01/06/25  0808 01/05/25  2124 01/05/25  1605 01/05/25  1139 01/05/25  0815 01/05/25  0711 01/05/25  0026   POC GLUCOSE mg/dl 200* 174* 239* 188* 201* 179* 154* 169* 195* 174* 164* 187*               Recent Cultures (last 7 days):         Imaging Results Review: No pertinent imaging studies reviewed.  Other Study Results Review: No additional pertinent studies reviewed.    Last 24 Hours Medication List:     Current Facility-Administered Medications:     acetaminophen (TYLENOL) tablet 650 mg, Q6H PRN    apixaban (ELIQUIS) tablet 5 mg, BID    atorvastatin (LIPITOR) tablet 10 mg, Daily    budesonide-formoterol (SYMBICORT) 160-4.5 mcg/act inhaler 2 puff, BID    bumetanide (BUMEX) tablet 6 mg, TID    cyanocobalamin (VITAMIN B-12) tablet 1,000 mcg, Daily    Empagliflozin (JARDIANCE) tablet 10 mg, Daily    famotidine (PEPCID) tablet 20 mg, Daily    fluticasone (FLONASE) 50 mcg/act nasal spray 1 spray, BID    insulin glargine (LANTUS) subcutaneous injection 10 Units 0.1 mL, HS    insulin lispro (HumALOG/ADMELOG) 100 units/mL subcutaneous injection 1-5 Units, TID AC **AND** Fingerstick Glucose (POCT), TID AC    insulin lispro (HumALOG/ADMELOG) 100 units/mL subcutaneous injection 1-5 Units, HS    loratadine (CLARITIN) tablet 10 mg, Daily    metoprolol succinate (TOPROL-XL) 24 hr tablet 50 mg, Daily    montelukast (SINGULAIR) tablet 10 mg, HS    pregabalin (LYRICA) capsule 50 mg, Daily    saccharomyces boulardii (FLORASTOR) capsule 250 mg, BID    Insert peripheral IV, Once **AND** sodium chloride (PF) 0.9 % injection 3 mL, Q1H PRN    spironolactone (ALDACTONE) tablet 25 mg, Daily    umeclidinium 62.5 mcg/actuation inhaler AEPB 1 puff, Daily    Administrative Statements   Today, Patient Was Seen By: Enedina Gray PA-C      **Please Note: This note  may have been constructed using a voice recognition system.**

## 2025-01-07 NOTE — DISCHARGE SUMMARY
Discharge Summary - Hospitalist   Name: Mesfin Cano 59 y.o. male I MRN: [id number]  Unit/Bed#: -01 I Date of Admission: 1/4/2025   Date of Service: 1/7/2025 I Hospital Day: 3     Assessment & Plan  Acute on chronic diastolic (congestive) heart failure (HCC)  Wt Readings from Last 3 Encounters:   01/07/25 (!) 143 kg (316 lb 5.8 oz)   12/04/24 (!) 147 kg (323 lb 6.4 oz)   11/28/24 (!) 143 kg (314 lb 9.6 oz)   EF 55%, G2DD, no significant valvular abnormalities  Presents with 7 lb weight gain, worsening LE edema, abdominal distention and SOB  Outpatient diuretic regimen: Bumex 6 mg tid + Metolazone 3x weekly  BNP 76 on admission   Initially on Bumex drip, this has now been discontinued as of 1/6 evening.  Now on p.o. Bumex 6 mg 3 times daily per heart failure, and stable for d/c.  Continue spironolactone and 3 times weekly metolazone dosing.  Close follow-up with heart failure on discharge.  Change potassium dosing to 40 mEq in a.m., 20 mEq every afternoon, 40 mEq every evening  Primary hypertension  Continue metoprolol 50mg daily  VICKY (obstructive sleep apnea)  C/w CPAP  Paroxysmal atrial fibrillation (HCC)  Continue metoprolol XL 50 mg qd  Eliquis for AC  Uncontrolled type 2 diabetes mellitus with hyperglycemia (HCC)  Lab Results   Component Value Date    HGBA1C 7.0 (H) 12/04/2024       Recent Labs     01/06/25  2111 01/07/25  0729 01/07/25  1157 01/07/25  1619   POCGLU 239* 174* 200* 187*       Blood Sugar Average: Last 72 hrs:  (P) 185.6239321280674900  Resume  home Rx on d/c  Morbid obesity (HCC)  BMI 42.88   Encourage diet and weight loss   Stage 3b chronic kidney disease (HCC)  Lab Results   Component Value Date    EGFR 34 01/07/2025    EGFR 39 01/06/2025    EGFR 44 01/05/2025    CREATININE 2.07 (H) 01/07/2025    CREATININE 1.86 (H) 01/06/2025    CREATININE 1.69 (H) 01/05/2025   Creat BL 1.5-2.0  Monitor creat with diuresis, trended up with bumex gtt now transitioned to PO diuretic on 1/7.    Avoid  hypotension and nephrotoxic drugs   Stable   Serum total bilirubin elevated  T bili level on admission 1.41- monitor   Initially reported abd pain on admission and loose stools  CT A/P negative   Elevation likely in the setting of CHF exacerbation   Repeat am cmp   Monitor stools- consider cdiff testing with recent admission if diarrhea occurs, RN to monitor stools   Liver lesion, left lobe  Incidentally noted on CT imaging, could represent cyst.  Pt will need outpt f/u -- he is aware of this.      Medical Problems       Resolved Problems  Date Reviewed: 11/23/2024   None       Discharging Physician / Practitioner: Enedina Gray PA-C  PCP: Soo Chavez MD  Admission Date:   Admission Orders (From admission, onward)       Ordered        01/04/25 2042  INPATIENT ADMISSION  Once                          Discharge Date: 1/9/25    Consultations During Hospital Stay:  Heart Failure     Procedures Performed:   None    Significant Findings / Test Results:   As above     Incidental Findings:   0.6 cm lesion left lobe liver, may represent cyst    I reviewed the above mentioned incidental findings with the patient and/or family and they expressed understanding.    Test Results Pending at Discharge (will require follow up):   none     Outpatient Tests Requested:  none    Complications:  None    Reason for Admission: CHF exacerbation     Hospital Course:   Mesfin Cano is a 59 y.o. male patient who originally presented to the hospital on 1/4/2025 due to weight gain, increased LE edema refractory to PO diuretic regimen at home.  Seen by HF team, initially was on IV bumex gtt with good response.  With elevated Cr/euvolemic on exam he was switched to PO Bumex on 1/7.  With high risk for recurrent admission he was monitored on oral regimen, and deemed stable for discharge by heart failure service on 1/9/2025.  He is diuresing well on current regimen.  Close outpatient follow-up with heart failure team.      Please see above  list of diagnoses and related plan for additional information.     Condition at Discharge: good    Discharge Day Visit / Exam:   Subjective:  No complaints, feels well.  Glad he is getting to go home.   Vitals: Blood Pressure: 129/77 (01/09/25 0721)  Pulse: 71 (01/09/25 0721)  Temperature: (!) 97.3 °F (36.3 °C) (01/09/25 0721)  Temp Source: Oral (01/08/25 1525)  Respirations: 16 (01/08/25 0233)  Weight - Scale: (!) 143 kg (315 lb 0.6 oz) (01/09/25 0536)  SpO2: 99 % (01/09/25 0721)  Physical Exam  Vitals reviewed.   Constitutional:       General: He is not in acute distress.     Appearance: He is not toxic-appearing.   HENT:      Head: Normocephalic and atraumatic.   Eyes:      Extraocular Movements: Extraocular movements intact.   Cardiovascular:      Rate and Rhythm: Normal rate and regular rhythm.   Pulmonary:      Effort: Pulmonary effort is normal. No respiratory distress.      Breath sounds: Normal breath sounds.   Abdominal:      General: Bowel sounds are normal. There is no distension.      Palpations: Abdomen is soft.   Musculoskeletal:         General: Normal range of motion.   Neurological:      General: No focal deficit present.      Mental Status: He is alert and oriented to person, place, and time.   Psychiatric:         Mood and Affect: Mood normal.         Behavior: Behavior normal.         Thought Content: Thought content normal.        Discussion with Family: Patient declined call to .     Discharge instructions/Information to patient and family:   See after visit summary for information provided to patient and family.      Provisions for Follow-Up Care:  See after visit summary for information related to follow-up care and any pertinent home health orders.      Mobility at time of Discharge:   Basic Mobility Inpatient Raw Score: 24  JH-HLM Goal: 8: Walk 250 feet or more  JH-HLM Achieved: 8: Walk 250 feet ot more  HLM Goal achieved. Continue to encourage appropriate mobility.      Disposition:   Home    Planned Readmission: no    Discharge Medications:  See after visit summary for reconciled discharge medications provided to patient and/or family.      Administrative Statements   Discharge Statement:  I have spent a total time of 25 minutes in caring for this patient on the day of the visit/encounter.     **Please Note: This note may have been constructed using a voice recognition system**

## 2025-01-07 NOTE — ASSESSMENT & PLAN NOTE
Lab Results   Component Value Date    EGFR 34 01/07/2025    EGFR 39 01/06/2025    EGFR 44 01/05/2025    CREATININE 2.07 (H) 01/07/2025    CREATININE 1.86 (H) 01/06/2025    CREATININE 1.69 (H) 01/05/2025   Creat BL 1.5-2.0  Monitor creat with diuresis, trended up with bumex gtt now transitioned to PO diuretic on 1/7.    Avoid hypotension and nephrotoxic drugs   Stable

## 2025-01-07 NOTE — ASSESSMENT & PLAN NOTE
Lab Results   Component Value Date    HGBA1C 7.0 (H) 12/04/2024       Recent Labs     01/06/25  2111 01/07/25  0729 01/07/25  1157 01/07/25  1619   POCGLU 239* 174* 200* 187*       Blood Sugar Average: Last 72 hrs:  (P) 185.7381882994970755  Resume  home Rx on d/c

## 2025-01-07 NOTE — UTILIZATION REVIEW
Continued Stay Review    Date: 1/07                    Current Patient Class: Inpatient  Current Level of Care: MS     HPI:59 y.o. male initially admitted on 1/04 1/07  reports breathing much better   s/p bumex drip  (dc  1/6  @ 2100)  able to amb to hallway.    today examines Euvolemic  with rising Cr.   Restart oral bumex  (6 mg TID)   cont aldactone      Medications:   Scheduled Medications:  apixaban, 5 mg, Oral, BID  atorvastatin, 10 mg, Oral, Daily  budesonide-formoterol, 2 puff, Inhalation, BID  bumetanide, 6 mg, Oral, TID  cyanocobalamin, 1,000 mcg, Oral, Daily  Empagliflozin, 10 mg, Oral, Daily  famotidine, 20 mg, Oral, Daily  fluticasone, 1 spray, Nasal, BID  insulin glargine, 10 Units, Subcutaneous, HS  insulin lispro, 1-5 Units, Subcutaneous, TID AC  insulin lispro, 1-5 Units, Subcutaneous, HS  loratadine, 10 mg, Oral, Daily  metoprolol succinate, 50 mg, Oral, Daily  montelukast, 10 mg, Oral, HS  pregabalin, 50 mg, Oral, Daily  saccharomyces boulardii, 250 mg, Oral, BID  spironolactone, 25 mg, Oral, Daily  umeclidinium, 1 puff, Inhalation, Daily      Continuous IV Infusions:     PRN Meds:  acetaminophen, 650 mg, Oral, Q6H PRN  sodium chloride (PF), 3 mL, Intravenous, Q1H PRN      Discharge Plan: tbd    Vital Signs (last 3 days)       Date/Time Temp Pulse Resp BP MAP (mmHg) SpO2 Lilly Coma Scale Score    01/07/25 14:35:24 97.5 °F (36.4 °C) 75 -- 126/72 90 97 % rm air --    01/07/25 11:58:11 98.1 °F (36.7 °C) 70 18 125/73 90 95 % --    01/07/25 0800 -- -- -- -- -- -- 15    01/07/25 07:31:19 97.3 °F (36.3 °C) 80 18 135/92 106 98 % --    01/06/25 21:15:27 97.6 °F (36.4 °C) 86 16 134/84 101 94 % --     Weight (last 2 days)       Date/Time Weight    01/07/25 0556 143 (316.36)    01/06/25 0600 143 (315.7)            Pertinent Labs/Diagnostic Results:   Radiology:  CT abdomen pelvis wo contrast   Final Interpretation by Ren Santana MD (01/04 1951)      No evidence of bowel obstruction, colitis or  diverticulitis.      Workstation performed: [id number]         X-ray chest 2 views   Final Interpretation by Carrie Marie MD (01/04 1828)      No acute cardiopulmonary disease.               Workstation performed: [id number]           Cardiology:  ECG 12 lead   Final Result by Kraig Mcmillan MD (01/06 1335)   Normal sinus rhythm   Low voltage QRS   Prolonged QT   Abnormal ECG   When compared with ECG of 04-Jan-2025 15:49, (unconfirmed)   ST no longer depressed in Inferior leads   Nonspecific T wave abnormality no longer evident in Inferior leads   Confirmed by Kraig Mcmillan ([id number]) on 1/6/2025 1:35:22 PM      ECG 12 lead   Final Result by Kraig Mcmillan MD (01/06 1335)   Normal sinus rhythm   Low voltage QRS   Nonspecific ST and T wave abnormality   Abnormal ECG   When compared with ECG of 04-Jan-2025 15:45, (unconfirmed)   Vent. rate has decreased by  51 bpm   Confirmed by Kraig Mcmillan ([id number]) on 1/6/2025 1:35:40 PM          Results from last 7 days   Lab Units 01/05/25  0420 01/04/25  1544   WBC Thousand/uL 9.20 7.18   HEMOGLOBIN g/dL 12.8 13.3   HEMATOCRIT % 38.6 40.5   PLATELETS Thousands/uL 255 274   TOTAL NEUT ABS Thousands/µL 6.37 4.83         Results from last 7 days   Lab Units 01/07/25  0556 01/06/25  0514 01/05/25  0420 01/04/25  1544   SODIUM mmol/L 136 136 134* 135   POTASSIUM mmol/L 3.6 3.2* 4.0 4.1   CHLORIDE mmol/L 94* 94* 94* 97   CO2 mmol/L 30 29 26 24   ANION GAP mmol/L 12 13 14* 14*   BUN mg/dL 37* 31* 28* 25   CREATININE mg/dL 2.07* 1.86* 1.69* 1.36*   EGFR ml/min/1.73sq m 34 39 44 57   CALCIUM mg/dL 7.6* 8.2* 8.2* 8.1*   MAGNESIUM mg/dL 2.1 2.0 1.9  --      Results from last 7 days   Lab Units 01/06/25  0514 01/05/25  0420 01/04/25  1544   AST U/L 11* 13 15   ALT U/L 7 10 9   ALK PHOS U/L 113* 120* 124*   TOTAL PROTEIN g/dL 6.5 7.0 7.0   ALBUMIN g/dL 3.8 4.1 4.1   TOTAL BILIRUBIN mg/dL 1.04* 1.41* 0.43     Results from last 7 days   Lab Units 01/07/25  1619 01/07/25  1158  01/07/25  0729 01/06/25  2111 01/06/25  1721 01/06/25  1127 01/06/25  0808 01/05/25  2124 01/05/25  1605 01/05/25  1139 01/05/25  0815 01/05/25  0711   POC GLUCOSE mg/dl 187* 200* 174* 239* 188* 201* 179* 154* 169* 195* 174* 164*     Results from last 7 days   Lab Units 01/07/25  0556 01/06/25  0514 01/05/25  0420 01/04/25  1544   GLUCOSE RANDOM mg/dL 166* 169* 159* 166*       Network Utilization Review Department  ATTENTION: Please call with any questions or concerns to [phone] and carefully listen to the prompts so that you are directed to the right person. All voicemails are confidential.   For Discharge needs, contact Care Management DC Support Team at [phone] opt. 2  Send all requests for admission clinical reviews, approved or denied determinations and any other requests to dedicated fax number below belonging to the [place] where the patient is receiving treatment. List of dedicated fax numbers for the Facilities:  FACILITY NAME UR FAX NUMBER   ADMISSION DENIALS (Administrative/Medical Necessity) [phone]   DISCHARGE SUPPORT TEAM (NETWORK) [phone]   PARENT CHILD HEALTH (Maternity/NICU/Pediatrics) [phone]   [facility] [phone]   [facility] [phone]   [facility] [phone]   [facility] [phone]   [facility] [phone]   [facility] [phone]   [facility] [phone]   [facility] [phone]   [facility] [phone]   [facility] [phone]   [facility] [phone]   [facility] [phone]

## 2025-01-07 NOTE — PLAN OF CARE
Problem: PAIN - ADULT  Goal: Verbalizes/displays adequate comfort level or baseline comfort level  Description: Interventions:  - Encourage patient to monitor pain and request assistance  - Assess pain using appropriate pain scale  - Administer analgesics based on type and severity of pain and evaluate response  - Implement non-pharmacological measures as appropriate and evaluate response  - Consider cultural and social influences on pain and pain management  - Notify physician/advanced practitioner if interventions unsuccessful or patient reports new pain  Outcome: Progressing     Problem: INFECTION - ADULT  Goal: Absence or prevention of progression during hospitalization  Description: INTERVENTIONS:  - Assess and monitor for signs and symptoms of infection  - Monitor lab/diagnostic results  - Monitor all insertion sites, i.e. indwelling lines, tubes, and drains  - Monitor endotracheal if appropriate and nasal secretions for changes in amount and color  - [place] appropriate cooling/warming therapies per order  - Administer medications as ordered  - Instruct and encourage patient and family to use good hand hygiene technique  - Identify and instruct in appropriate isolation precautions for identified infection/condition  Outcome: Progressing  Goal: Absence of fever/infection during neutropenic period  Description: INTERVENTIONS:  - Monitor WBC    Outcome: Progressing

## 2025-01-07 NOTE — ASSESSMENT & PLAN NOTE
Lab Results   Component Value Date    HGBA1C 7.0 (H) 12/04/2024       Recent Labs     01/06/25  1721 01/06/25  2111 01/07/25  0729 01/07/25  1157   POCGLU 188* 239* 174* 200*       Blood Sugar Average: Last 72 hrs:  (P) 185.2010475944334026  Outpatient regimen: sitagliptin, Jardiance (for CHF) - currently on hold   SSI with meals while hospitalized  Add Lantus QHS with persistent hyperglycemia   Hypoglycemic protocol

## 2025-01-07 NOTE — ASSESSMENT & PLAN NOTE
Wt Readings from Last 3 Encounters:   01/07/25 (!) 143 kg (316 lb 5.8 oz)   12/04/24 (!) 147 kg (323 lb 6.4 oz)   11/28/24 (!) 143 kg (314 lb 9.6 oz)   EF 55%, G2DD, no significant valvular abnormalities  Presents with 7 lb weight gain, worsening LE edema, abdominal distention and SOB  Outpatient diuretic regimen: Bumex 6 mg tid + Metolazone 3x weekly  BNP 76 on admission   Initially on Bumex drip, this has now been discontinued as of 1/6 evening.  Now on p.o. Bumex 6 mg 3 times daily per heart failure, and stable for d/c.  Continue spironolactone and 3 times weekly metolazone dosing.  Close follow-up with heart failure on discharge.  Change potassium dosing to 40 mEq in a.m., 20 mEq every afternoon, 40 mEq every evening

## 2025-01-07 NOTE — ASSESSMENT & PLAN NOTE
Presented 1/4 with weight gain 7+ rounds, edema, worsening dyspnea on exertion.  No clear trigger  June 2024 echo with 55% EF  Home diuretics: Bumex 6 mg 3 times daily, metolazone 5 mg 3 times weekly  BNP 76, previously 250s to 400s  Weight now back at 315 pounds, patient not quite feeling back to baseline  3100 cc urine, net -2.9L   Telemetry with no events overnight.  Creatinine slightly above baseline at 2.07 (upper limit of baseline 2.0)    Plan  Discussed with attending  Resume home bumex regimen today - 6mg TID  Hold home metolazone for now  Fluid restriction 1800 cc, 2 g sodium diet  Continue home meds: Metoprolol XL 50 mg daily, Aldactone 25 mg daily, Jardiance 10 mg daily  Should have short term follow up in cardiology office - sees Dr. Wilcox

## 2025-01-07 NOTE — ASSESSMENT & PLAN NOTE
Wt Readings from Last 3 Encounters:   01/07/25 (!) 143 kg (316 lb 5.8 oz)   12/04/24 (!) 147 kg (323 lb 6.4 oz)   11/28/24 (!) 143 kg (314 lb 9.6 oz)   EF 55%, G2DD, no significant valvular abnormalities  Presents with 7 lb weight gain, worsening LE edema, abdominal distention and SOB  Outpatient diuretic regimen: Bumex 6 mg tid + Metolazone 3x weekly  BNP 78 on admission   Initially on Bumex drip, this has now been discontinued as of 1/6 evening.  Now on p.o. Bumex 6 mg 3 times daily per heart failure  Monitor on telemetry   Fluid restriction 1800 cc/ and 2 gram sodium diet   HF appreciated   Monitor I/Os, daily weights   Continue Aldactone

## 2025-01-07 NOTE — PLAN OF CARE
Problem: Potential for Falls  Goal: Patient will remain free of falls  Description: INTERVENTIONS:  - Educate patient/family on patient safety including physical limitations  - Instruct patient to call for assistance with activity   - Consult OT/PT to assist with strengthening/mobility   - Keep Call bell within reach  - Keep bed low and locked with side rails adjusted as appropriate  - Keep care items and personal belongings within reach  - Initiate and maintain comfort rounds  - Make Fall Risk Sign visible to staff  - Apply yellow socks and bracelet for high fall risk patients  - Consider moving patient to room near nurses station  Outcome: Progressing     Problem: PAIN - ADULT  Goal: Verbalizes/displays adequate comfort level or baseline comfort level  Description: Interventions:  - Encourage patient to monitor pain and request assistance  - Assess pain using appropriate pain scale  - Administer analgesics based on type and severity of pain and evaluate response  - Implement non-pharmacological measures as appropriate and evaluate response  - Consider cultural and social influences on pain and pain management  - Notify physician/advanced practitioner if interventions unsuccessful or patient reports new pain  Outcome: Progressing     Problem: SAFETY ADULT  Goal: Patient will remain free of falls  Description: INTERVENTIONS:  - Educate patient/family on patient safety including physical limitations  - Instruct patient to call for assistance with activity   - Consult OT/PT to assist with strengthening/mobility   - Keep Call bell within reach  - Keep bed low and locked with side rails adjusted as appropriate  - Keep care items and personal belongings within reach  - Initiate and maintain comfort rounds  - Make Fall Risk Sign visible to staff  - Apply yellow socks and bracelet for high fall risk patients  - Consider moving patient to room near nurses station  Outcome: Progressing     Problem: DISCHARGE  PLANNING  Goal: Discharge to home or other facility with appropriate resources  Description: INTERVENTIONS:  - Identify barriers to discharge w/patient and caregiver  - Arrange for needed discharge resources and transportation as appropriate  - Identify discharge learning needs (meds, wound care, etc.)  - Arrange for interpretive services to assist at discharge as needed  - Refer to Case Management Department for coordinating discharge planning if the patient needs post-hospital services based on physician/advanced practitioner order or complex needs related to functional status, cognitive ability, or social support system  1/6/2025 2004 by Annalise Cosby RN  Outcome: Progressing  1/6/2025 2004 by Annalise Cosby RN  Outcome: Progressing     Problem: Knowledge Deficit  Goal: Patient/family/caregiver demonstrates understanding of disease process, treatment plan, medications, and discharge instructions  Description: Complete learning assessment and assess knowledge base.  Interventions:  - Provide teaching at level of understanding  - Provide teaching via preferred learning methods  1/6/2025 2004 by Annalise Cosby RN  Outcome: Progressing  1/6/2025 2004 by Annalise Cosby RN  Outcome: Progressing     Problem: METABOLIC, FLUID AND ELECTROLYTES - ADULT  Goal: Fluid balance maintained  Description: INTERVENTIONS:  - Monitor labs   - Monitor I/O and WT  - Instruct patient on fluid and nutrition as appropriate  - Assess for signs & symptoms of volume excess or deficit  Outcome: Progressing

## 2025-01-08 PROBLEM — K76.9 LIVER LESION, LEFT LOBE: Status: ACTIVE | Noted: 2025-01-08

## 2025-01-08 LAB
ANION GAP SERPL CALCULATED.3IONS-SCNC: 12 MMOL/L (ref 4–13)
BUN SERPL-MCNC: 35 MG/DL (ref 5–25)
CALCIUM SERPL-MCNC: 7.7 MG/DL (ref 8.4–10.2)
CHLORIDE SERPL-SCNC: 96 MMOL/L (ref 96–108)
CO2 SERPL-SCNC: 31 MMOL/L (ref 21–32)
CREAT SERPL-MCNC: 1.61 MG/DL (ref 0.6–1.3)
GFR SERPL CREATININE-BSD FRML MDRD: 46 ML/MIN/1.73SQ M
GLUCOSE SERPL-MCNC: 154 MG/DL (ref 65–140)
GLUCOSE SERPL-MCNC: 172 MG/DL (ref 65–140)
GLUCOSE SERPL-MCNC: 176 MG/DL (ref 65–140)
GLUCOSE SERPL-MCNC: 214 MG/DL (ref 65–140)
GLUCOSE SERPL-MCNC: 251 MG/DL (ref 65–140)
POTASSIUM SERPL-SCNC: 3.4 MMOL/L (ref 3.5–5.3)
SODIUM SERPL-SCNC: 139 MMOL/L (ref 135–147)

## 2025-01-08 PROCEDURE — 80048 BASIC METABOLIC PNL TOTAL CA: CPT | Performed by: PHYSICIAN ASSISTANT

## 2025-01-08 PROCEDURE — 99232 SBSQ HOSP IP/OBS MODERATE 35: CPT | Performed by: PHYSICIAN ASSISTANT

## 2025-01-08 PROCEDURE — 99232 SBSQ HOSP IP/OBS MODERATE 35: CPT | Performed by: INTERNAL MEDICINE

## 2025-01-08 PROCEDURE — 82948 REAGENT STRIP/BLOOD GLUCOSE: CPT

## 2025-01-08 RX ORDER — POTASSIUM CHLORIDE 1500 MG/1
20 TABLET, EXTENDED RELEASE ORAL ONCE
Status: COMPLETED | OUTPATIENT
Start: 2025-01-08 | End: 2025-01-08

## 2025-01-08 RX ORDER — POTASSIUM CHLORIDE 1500 MG/1
40 TABLET, EXTENDED RELEASE ORAL ONCE
Status: COMPLETED | OUTPATIENT
Start: 2025-01-08 | End: 2025-01-08

## 2025-01-08 RX ORDER — POTASSIUM CHLORIDE 1500 MG/1
40 TABLET, EXTENDED RELEASE ORAL 2 TIMES DAILY
Status: DISCONTINUED | OUTPATIENT
Start: 2025-01-08 | End: 2025-01-09 | Stop reason: HOSPADM

## 2025-01-08 RX ADMIN — EMPAGLIFLOZIN 10 MG: 10 TABLET, FILM COATED ORAL at 08:40

## 2025-01-08 RX ADMIN — FLUTICASONE PROPIONATE 1 SPRAY: 50 SPRAY, METERED NASAL at 08:41

## 2025-01-08 RX ADMIN — BUMETANIDE 6 MG: 2 TABLET ORAL at 08:34

## 2025-01-08 RX ADMIN — Medication 250 MG: at 08:40

## 2025-01-08 RX ADMIN — SPIRONOLACTONE 25 MG: 25 TABLET, FILM COATED ORAL at 08:39

## 2025-01-08 RX ADMIN — POTASSIUM CHLORIDE 40 MEQ: 1500 TABLET, EXTENDED RELEASE ORAL at 08:34

## 2025-01-08 RX ADMIN — APIXABAN 5 MG: 5 TABLET, FILM COATED ORAL at 18:07

## 2025-01-08 RX ADMIN — INSULIN GLARGINE 10 UNITS: 100 INJECTION, SOLUTION SUBCUTANEOUS at 21:27

## 2025-01-08 RX ADMIN — BUDESONIDE AND FORMOTEROL FUMARATE DIHYDRATE 2 PUFF: 160; 4.5 AEROSOL RESPIRATORY (INHALATION) at 18:07

## 2025-01-08 RX ADMIN — ATORVASTATIN CALCIUM 10 MG: 10 TABLET, FILM COATED ORAL at 08:35

## 2025-01-08 RX ADMIN — LORATADINE 10 MG: 10 TABLET ORAL at 08:35

## 2025-01-08 RX ADMIN — BUMETANIDE 6 MG: 2 TABLET ORAL at 19:57

## 2025-01-08 RX ADMIN — INSULIN LISPRO 1 UNITS: 100 INJECTION, SOLUTION INTRAVENOUS; SUBCUTANEOUS at 21:28

## 2025-01-08 RX ADMIN — INSULIN LISPRO 2 UNITS: 100 INJECTION, SOLUTION INTRAVENOUS; SUBCUTANEOUS at 11:54

## 2025-01-08 RX ADMIN — FAMOTIDINE 20 MG: 20 TABLET, FILM COATED ORAL at 08:35

## 2025-01-08 RX ADMIN — INSULIN LISPRO 1 UNITS: 100 INJECTION, SOLUTION INTRAVENOUS; SUBCUTANEOUS at 18:08

## 2025-01-08 RX ADMIN — FLUTICASONE PROPIONATE 1 SPRAY: 50 SPRAY, METERED NASAL at 18:08

## 2025-01-08 RX ADMIN — BUDESONIDE AND FORMOTEROL FUMARATE DIHYDRATE 2 PUFF: 160; 4.5 AEROSOL RESPIRATORY (INHALATION) at 08:41

## 2025-01-08 RX ADMIN — INSULIN LISPRO 1 UNITS: 100 INJECTION, SOLUTION INTRAVENOUS; SUBCUTANEOUS at 08:40

## 2025-01-08 RX ADMIN — POTASSIUM CHLORIDE 20 MEQ: 1500 TABLET, EXTENDED RELEASE ORAL at 13:39

## 2025-01-08 RX ADMIN — Medication 250 MG: at 18:07

## 2025-01-08 RX ADMIN — APIXABAN 5 MG: 5 TABLET, FILM COATED ORAL at 08:34

## 2025-01-08 RX ADMIN — POTASSIUM CHLORIDE 40 MEQ: 1500 TABLET, EXTENDED RELEASE ORAL at 18:07

## 2025-01-08 RX ADMIN — PREGABALIN 50 MG: 50 CAPSULE ORAL at 08:34

## 2025-01-08 RX ADMIN — MONTELUKAST 10 MG: 10 TABLET, FILM COATED ORAL at 21:28

## 2025-01-08 RX ADMIN — UMECLIDINIUM 1 PUFF: 62.5 AEROSOL, POWDER ORAL at 08:41

## 2025-01-08 RX ADMIN — METOPROLOL SUCCINATE 50 MG: 50 TABLET, EXTENDED RELEASE ORAL at 08:35

## 2025-01-08 RX ADMIN — CYANOCOBALAMIN TAB 500 MCG 1000 MCG: 500 TAB at 08:35

## 2025-01-08 NOTE — ASSESSMENT & PLAN NOTE
Lab Results   Component Value Date    EGFR 46 01/08/2025    EGFR 34 01/07/2025    EGFR 39 01/06/2025    CREATININE 1.61 (H) 01/08/2025    CREATININE 2.07 (H) 01/07/2025    CREATININE 1.86 (H) 01/06/2025   Creat BL 1.5-2.0  Monitor creat with diuresis, trended up with bumex gtt now transitioned to PO diuretic on 1/7.    Avoid hypotension and nephrotoxic drugs   Check am bmp

## 2025-01-08 NOTE — PROGRESS NOTES
Progress Note - Heart Failure   Name: Mesfin Cano 59 y.o. male I MRN: [id number]  Unit/Bed#: -01 I Date of Admission: 1/4/2025   Date of Service: 1/8/2025 I Hospital Day: 4    Assessment & Plan  Acute on chronic combined systolic and diastolic heart failure, NYHA class 3 (HCC)  Presented 1/4 with weight gain 7+ rounds, edema, worsening dyspnea on exertion.  No clear trigger  June 2024 echo with 55% EF  Home diuretics: Bumex 6 mg 3 times daily, metolazone 5 mg 3 times weekly  BNP 78, previously 250s to 400s  Weight now back at 315 pounds, patient not quite feeling back to baseline  2.9 L of urine last 24 hours, -2.6 L / 24 hours, net -5.5 L per admission    Plan  Discussed with attending  Loop diuretics changed to home regimen 1/7: Bumex 6mg TID  May resume metolazone 5 mg Tuesday/Thursday/Saturday  Also takes potassium chloride 40 mEq twice daily  Fluid restriction 1800 cc, 2 g sodium diet  Continue home meds: Metoprolol XL 50 mg daily, Aldactone 25 mg daily, Jardiance 10 mg daily  Should have short term follow up in cardiology office - sees Dr. Wilcox  Monitor 1 more day on home regimen and hypokalemia  Primary hypertension  Continue meds as above  VICKY (obstructive sleep apnea)  Encouraged CPAP use  Going to see sleep medicine soon  Encouraged calling and making an appointment for soon after discharge  Paroxysmal atrial fibrillation (HCC)  Maintained on Eliquis  Stage 3b chronic kidney disease (HCC)  Lab Results   Component Value Date    EGFR 46 01/08/2025    EGFR 34 01/07/2025    EGFR 39 01/06/2025    CREATININE 1.61 (H) 01/08/2025    CREATININE 2.07 (H) 01/07/2025    CREATININE 1.86 (H) 01/06/2025     Baseline creatinine appears to be anywhere from 1.4-2, still within baseline    Subjective     Feeling well.  Does not feel like he is having any worse swelling.    Objective :  Temp:  [97.5 °F (36.4 °C)-98.1 °F (36.7 °C)] 97.7 °F (36.5 °C)  HR:  [62-75] 70  BP: (120-130)/(66-79) 121/79  Resp:  [16-18]  16  SpO2:  [94 %-98 %] 96 %  O2 Device: None (Room air)  Orthostatic Blood Pressures      Flowsheet Row Most Recent Value   Blood Pressure 121/79 filed at 01/08/2025 0807          First Weight: Weight - Scale: (!) 147 kg (325 lb) (01/04/25 2013)  Vitals:    01/08/25 0500 01/08/25 0533   Weight: (!) 144 kg (317 lb 10.9 oz) (!) 144 kg (317 lb 10.9 oz)     Physical Exam  Constitutional:       General: He is not in acute distress.     Appearance: He is obese. He is not ill-appearing.   HENT:      Mouth/Throat:      Mouth: Mucous membranes are moist.      Pharynx: Oropharynx is clear.   Neck:      Comments: No JVP  Cardiovascular:      Rate and Rhythm: Normal rate and regular rhythm.   Pulmonary:      Effort: Pulmonary effort is normal.      Breath sounds: Normal breath sounds.   Abdominal:      Palpations: Abdomen is soft.   Musculoskeletal:      Right lower leg: No edema.      Left lower leg: No edema.   Skin:     General: Skin is warm.   Neurological:      Mental Status: He is alert.           Lab Results: I have reviewed the following results:  Results from last 7 days   Lab Units 01/05/25  0420 01/04/25  1544   WBC Thousand/uL 9.20 7.18   HEMOGLOBIN g/dL 12.8 13.3   HEMATOCRIT % 38.6 40.5   PLATELETS Thousands/uL 255 274     Results from last 7 days   Lab Units 01/08/25  0500 01/07/25  0556 01/06/25  0514   POTASSIUM mmol/L 3.4* 3.6 3.2*   CHLORIDE mmol/L 96 94* 94*   CO2 mmol/L 31 30 29   BUN mg/dL 35* 37* 31*   CREATININE mg/dL 1.61* 2.07* 1.86*   CALCIUM mg/dL 7.7* 7.6* 8.2*     Results from last 7 days   Lab Units 01/05/25  0420   INR  0.96   PTT seconds 29     Lab Results   Component Value Date    HGBA1C 7.0 (H) 12/04/2024     Lab Results   Component Value Date    TROPONINI <0.02 09/22/2021             VTE Pharmacologic Prophylaxis: VTE covered by:  apixaban, Oral, 5 mg at 01/08/25 0834     VTE Mechanical Prophylaxis: sequential compression device

## 2025-01-08 NOTE — ASSESSMENT & PLAN NOTE
T bili level on admission 1.41- monitor   Initially reported abd pain on admission and loose stools  CT A/P negative   Elevation likely in the setting of CHF exacerbation   Monitor stools- consider cdiff testing with recent admission if diarrhea occurs, RN to monitor stools

## 2025-01-08 NOTE — ASSESSMENT & PLAN NOTE
Wt Readings from Last 3 Encounters:   01/08/25 (!) 144 kg (317 lb 10.9 oz)   12/04/24 (!) 147 kg (323 lb 6.4 oz)   11/28/24 (!) 143 kg (314 lb 9.6 oz)   EF 55%, G2DD, no significant valvular abnormalities  Presents with 7 lb weight gain, worsening LE edema, abdominal distention and SOB  Outpatient diuretic regimen: Bumex 6 mg tid + Metolazone 3x weekly  BNP 78 on admission   Initially on Bumex drip, this has now been discontinued as of 1/6 evening.  Now on p.o. Bumex 6 mg 3 times daily per heart failure  Fluid restriction 1800 cc/ and 2 gram sodium diet   HF appreciated   Monitor I/Os, daily weights   Continue Aldactone, Jardiance

## 2025-01-08 NOTE — PLAN OF CARE
Problem: Potential for Falls  Goal: Patient will remain free of falls  Description: INTERVENTIONS:  - Educate patient/family on patient safety including physical limitations  - Instruct patient to call for assistance with activity   - Consult OT/PT to assist with strengthening/mobility   - Keep Call bell within reach  - Keep bed low and locked with side rails adjusted as appropriate  - Keep care items and personal belongings within reach  - Initiate and maintain comfort rounds  - Make Fall Risk Sign visible to staff  - Offer Toileting every  Hours, in advance of need  - Initiate/Maintain alarm  - Obtain necessary fall risk management equipment:   - Apply yellow socks and bracelet for high fall risk patients  - Consider moving patient to room near nurses station  Outcome: Progressing     Problem: PAIN - ADULT  Goal: Verbalizes/displays adequate comfort level or baseline comfort level  Description: Interventions:  - Encourage patient to monitor pain and request assistance  - Assess pain using appropriate pain scale  - Administer analgesics based on type and severity of pain and evaluate response  - Implement non-pharmacological measures as appropriate and evaluate response  - Consider cultural and social influences on pain and pain management  - Notify physician/advanced practitioner if interventions unsuccessful or patient reports new pain  Outcome: Progressing     Problem: INFECTION - ADULT  Goal: Absence or prevention of progression during hospitalization  Description: INTERVENTIONS:  - Assess and monitor for signs and symptoms of infection  - Monitor lab/diagnostic results  - Monitor all insertion sites, i.e. indwelling lines, tubes, and drains  - Monitor endotracheal if appropriate and nasal secretions for changes in amount and color  - [place] appropriate cooling/warming therapies per order  - Administer medications as ordered  - Instruct and encourage patient and family to use good hand hygiene technique  -  Identify and instruct in appropriate isolation precautions for identified infection/condition  Outcome: Progressing  Goal: Absence of fever/infection during neutropenic period  Description: INTERVENTIONS:  - Monitor WBC    Outcome: Progressing     Problem: SAFETY ADULT  Goal: Patient will remain free of falls  Description: INTERVENTIONS:  - Educate patient/family on patient safety including physical limitations  - Instruct patient to call for assistance with activity   - Consult OT/PT to assist with strengthening/mobility   - Keep Call bell within reach  - Keep bed low and locked with side rails adjusted as appropriate  - Keep care items and personal belongings within reach  - Initiate and maintain comfort rounds  - Make Fall Risk Sign visible to staff  - Offer Toileting every  Hours, in advance of need  - Initiate/Maintain alarm  - Obtain necessary fall risk management equipment:   - Apply yellow socks and bracelet for high fall risk patients  - Consider moving patient to room near nurses station  Outcome: Progressing  Goal: Maintain or return to baseline ADL function  Description: INTERVENTIONS:  -  Assess patient's ability to carry out ADLs; assess patient's baseline for ADL function and identify physical deficits which impact ability to perform ADLs (bathing, care of mouth/teeth, toileting, grooming, dressing, etc.)  - Assess/evaluate cause of self-care deficits   - Assess range of motion  - Assess patient's mobility; develop plan if impaired  - Assess patient's need for assistive devices and provide as appropriate  - Encourage maximum independence but intervene and supervise when necessary  - Involve family in performance of ADLs  - Assess for home care needs following discharge   - Consider OT consult to assist with ADL evaluation and planning for discharge  - Provide patient education as appropriate  Outcome: Progressing  Goal: Maintains/Returns to pre admission functional level  Description:  INTERVENTIONS:  - Perform AM-PAC 6 Click Basic Mobility/ Daily Activity assessment daily.  - Set and communicate daily mobility goal to care team and patient/family/caregiver.   - Collaborate with rehabilitation services on mobility goals if consulted  - Perform Range of Motion  times a day.  - Reposition patient every  hours.  - Dangle patient  times a day  - Stand patient  times a day  - Ambulate patient  times a day  - Out of bed to chair  times a day   - Out of bed for meals times a day  - Out of bed for toileting  - Record patient progress and toleration of activity level   Outcome: Progressing     Problem: DISCHARGE PLANNING  Goal: Discharge to home or other facility with appropriate resources  Description: INTERVENTIONS:  - Identify barriers to discharge w/patient and caregiver  - Arrange for needed discharge resources and transportation as appropriate  - Identify discharge learning needs (meds, wound care, etc.)  - Arrange for interpretive services to assist at discharge as needed  - Refer to Case Management Department for coordinating discharge planning if the patient needs post-hospital services based on physician/advanced practitioner order or complex needs related to functional status, cognitive ability, or social support system  Outcome: Progressing     Problem: Knowledge Deficit  Goal: Patient/family/caregiver demonstrates understanding of disease process, treatment plan, medications, and discharge instructions  Description: Complete learning assessment and assess knowledge base.  Interventions:  - Provide teaching at level of understanding  - Provide teaching via preferred learning methods  Outcome: Progressing     Problem: METABOLIC, FLUID AND ELECTROLYTES - ADULT  Goal: Fluid balance maintained  Description: INTERVENTIONS:  - Monitor labs   - Monitor I/O and WT  - Instruct patient on fluid and nutrition as appropriate  - Assess for signs & symptoms of volume excess or deficit  Outcome: Progressing

## 2025-01-08 NOTE — ASSESSMENT & PLAN NOTE
Incidentally noted on CT imaging, could represent cyst.  Pt will need outpt f/u -- he is aware of this.

## 2025-01-08 NOTE — ASSESSMENT & PLAN NOTE
Lab Results   Component Value Date    EGFR 46 01/08/2025    EGFR 34 01/07/2025    EGFR 39 01/06/2025    CREATININE 1.61 (H) 01/08/2025    CREATININE 2.07 (H) 01/07/2025    CREATININE 1.86 (H) 01/06/2025     Baseline creatinine appears to be anywhere from 1.4-2, still within baseline

## 2025-01-08 NOTE — ASSESSMENT & PLAN NOTE
Presented 1/4 with weight gain 7+ rounds, edema, worsening dyspnea on exertion.  No clear trigger  June 2024 echo with 55% EF  Home diuretics: Bumex 6 mg 3 times daily, metolazone 5 mg 3 times weekly  BNP 78, previously 250s to 400s  Weight now back at 315 pounds, patient not quite feeling back to baseline  2.9 L of urine last 24 hours, -2.6 L / 24 hours, net -5.5 L per admission    Plan  Discussed with attending  Loop diuretics changed to home regimen 1/7: Bumex 6mg TID  May resume metolazone 5 mg Tuesday/Thursday/Saturday  Also takes potassium chloride 40 mEq twice daily  Fluid restriction 1800 cc, 2 g sodium diet  Continue home meds: Metoprolol XL 50 mg daily, Aldactone 25 mg daily, Jardiance 10 mg daily  Should have short term follow up in cardiology office - sees Dr. Wilcox  Monitor 1 more day on home regimen and hypokalemia

## 2025-01-08 NOTE — PROGRESS NOTES
Progress Note - Hospitalist   Name: Mesfin Cano 59 y.o. male I MRN: [id number]  Unit/Bed#: -01 I Date of Admission: 1/4/2025   Date of Service: 1/8/2025 I Hospital Day: 4    Assessment & Plan  Acute on chronic combined systolic and diastolic heart failure, NYHA class 3 (HCC)  Wt Readings from Last 3 Encounters:   01/08/25 (!) 144 kg (317 lb 10.9 oz)   12/04/24 (!) 147 kg (323 lb 6.4 oz)   11/28/24 (!) 143 kg (314 lb 9.6 oz)   EF 55%, G2DD, no significant valvular abnormalities  Presents with 7 lb weight gain, worsening LE edema, abdominal distention and SOB  Outpatient diuretic regimen: Bumex 6 mg tid + Metolazone 3x weekly  BNP 76 on admission   Initially on Bumex drip, this has now been discontinued as of 1/6 evening.  Now on p.o. Bumex 6 mg 3 times daily per heart failure  Fluid restriction 1800 cc/ and 2 gram sodium diet   HF appreciated   Monitor I/Os, daily weights   Continue Aldactone, Jardiance   Primary hypertension  Continue metoprolol 50mg daily, monitor BP while on bumex drip  VICKY (obstructive sleep apnea)  C/w CPAP  Paroxysmal atrial fibrillation (HCC)  Continue metoprolol XL 50 mg qd  Eliquis for AC  Uncontrolled type 2 diabetes mellitus with hyperglycemia (HCC)  Lab Results   Component Value Date    HGBA1C 7.0 (H) 12/04/2024       Recent Labs     01/07/25  1157 01/07/25  1619 01/07/25  2104 01/08/25  0819   POCGLU 200* 187* 203* 172*       Blood Sugar Average: Last 72 hrs:  (P) 185.7700400651164099  Outpatient regimen: sitagliptin, Jardiance (for CHF).  Currently on Jardiance IP   SSI with meals while hospitalized  Add Lantus QHS with persistent hyperglycemia   Hypoglycemic protocol  Morbid obesity (HCC)  BMI 42.88   Encourage diet and weight loss   Stage 3b chronic kidney disease (HCC)  Lab Results   Component Value Date    EGFR 46 01/08/2025    EGFR 34 01/07/2025    EGFR 39 01/06/2025    CREATININE 1.61 (H) 01/08/2025    CREATININE 2.07 (H) 01/07/2025    CREATININE 1.86 (H) 01/06/2025   Creat  BL 1.5-2.0  Monitor creat with diuresis, trended up with bumex gtt now transitioned to PO diuretic on 1/7.    Avoid hypotension and nephrotoxic drugs   Check am bmp  Serum total bilirubin elevated  T bili level on admission 1.41- monitor   Initially reported abd pain on admission and loose stools  CT A/P negative   Elevation likely in the setting of CHF exacerbation   Monitor stools- consider cdiff testing with recent admission if diarrhea occurs, RN to monitor stools   Liver lesion, left lobe  Incidentally noted on CT imaging, could represent cyst.  Pt will need outpt f/u -- he is aware of this.     VTE Pharmacologic Prophylaxis: VTE Score: 4 Moderate Risk (Score 3-4) - Pharmacological DVT Prophylaxis Ordered: apixaban (Eliquis).    Mobility:   Basic Mobility Inpatient Raw Score: 24  JH-HLM Goal: 8: Walk 250 feet or more  JH-HLM Achieved: 6: Walk 10 steps or more  JH-HLM Goal NOT achieved. Continue with multidisciplinary rounding and encourage appropriate mobility to improve upon JH-HLM goals.    Patient Centered Rounds: I performed bedside rounds with nursing staff today.   Discussions with Specialists or Other Care Team Provider: HF    Education and Discussions with Family / Patient: Patient declined call to .     Current Length of Stay: 4 day(s)  Current Patient Status: Inpatient   Certification Statement: The patient will continue to require additional inpatient hospital stay due to PO diuretic monitoring with high risk readmission.  Discharge Plan: Anticipate discharge in 24-48 hrs to home.    Code Status: Level 1 - Full Code    Subjective   No complaints, and reports he actually slept well last night.     Objective :  Temp:  [97.5 °F (36.4 °C)-98.1 °F (36.7 °C)] 97.7 °F (36.5 °C)  HR:  [62-75] 70  BP: (120-130)/(66-79) 121/79  Resp:  [16-18] 16  SpO2:  [94 %-98 %] 96 %  O2 Device: None (Room air)    Body mass index is 41.91 kg/m².     Input and Output Summary (last 24 hours):     Intake/Output  Summary (Last 24 hours) at 1/8/2025 1005  Last data filed at 1/8/2025 0230  Gross per 24 hour   Intake 120 ml   Output 2550 ml   Net -2430 ml       Physical Exam  Vitals reviewed.   Constitutional:       General: He is not in acute distress.     Appearance: He is not toxic-appearing.   HENT:      Head: Normocephalic and atraumatic.   Eyes:      Extraocular Movements: Extraocular movements intact.   Cardiovascular:      Rate and Rhythm: Normal rate and regular rhythm.   Pulmonary:      Effort: Pulmonary effort is normal. No respiratory distress.   Abdominal:      General: Bowel sounds are normal. There is no distension.      Palpations: Abdomen is soft.      Tenderness: There is no abdominal tenderness.   Musculoskeletal:         General: Normal range of motion.   Neurological:      General: No focal deficit present.      Mental Status: He is alert and oriented to person, place, and time.   Psychiatric:         Mood and Affect: Mood normal.         Behavior: Behavior normal.         Thought Content: Thought content normal.       Lines/Drains:                   Lab Results: I have reviewed the following results:   Results from last 7 days   Lab Units 01/05/25  0420   WBC Thousand/uL 9.20   HEMOGLOBIN g/dL 12.8   HEMATOCRIT % 38.6   PLATELETS Thousands/uL 255   SEGS PCT % 69   LYMPHO PCT % 14   MONO PCT % 9   EOS PCT % 6     Results from last 7 days   Lab Units 01/08/25  0500 01/07/25  0556 01/06/25  0514   SODIUM mmol/L 139   < > 136   POTASSIUM mmol/L 3.4*   < > 3.2*   CHLORIDE mmol/L 96   < > 94*   CO2 mmol/L 31   < > 29   BUN mg/dL 35*   < > 31*   CREATININE mg/dL 1.61*   < > 1.86*   ANION GAP mmol/L 12   < > 13   CALCIUM mg/dL 7.7*   < > 8.2*   ALBUMIN g/dL  --   --  3.8   TOTAL BILIRUBIN mg/dL  --   --  1.04*   ALK PHOS U/L  --   --  113*   ALT U/L  --   --  7   AST U/L  --   --  11*   GLUCOSE RANDOM mg/dL 154*   < > 169*    < > = values in this interval not displayed.     Results from last 7 days   Lab Units  01/05/25  0420   INR  0.96     Results from last 7 days   Lab Units 01/08/25  0819 01/07/25  2104 01/07/25  1619 01/07/25  1157 01/07/25  0729 01/06/25  2111 01/06/25  1721 01/06/25  1127 01/06/25  0808 01/05/25  2124 01/05/25  1605 01/05/25  1139   POC GLUCOSE mg/dl 172* 203* 187* 200* 174* 239* 188* 201* 179* 154* 169* 195*               Recent Cultures (last 7 days):         Imaging Results Review: No pertinent imaging studies reviewed.  Other Study Results Review: No additional pertinent studies reviewed.    Last 24 Hours Medication List:     Current Facility-Administered Medications:     acetaminophen (TYLENOL) tablet 650 mg, Q6H PRN    apixaban (ELIQUIS) tablet 5 mg, BID    atorvastatin (LIPITOR) tablet 10 mg, Daily    budesonide-formoterol (SYMBICORT) 160-4.5 mcg/act inhaler 2 puff, BID    bumetanide (BUMEX) tablet 6 mg, TID    cyanocobalamin (VITAMIN B-12) tablet 1,000 mcg, Daily    Empagliflozin (JARDIANCE) tablet 10 mg, Daily    famotidine (PEPCID) tablet 20 mg, Daily    fluticasone (FLONASE) 50 mcg/act nasal spray 1 spray, BID    insulin glargine (LANTUS) subcutaneous injection 10 Units 0.1 mL, HS    insulin lispro (HumALOG/ADMELOG) 100 units/mL subcutaneous injection 1-5 Units, TID AC **AND** Fingerstick Glucose (POCT), TID AC    insulin lispro (HumALOG/ADMELOG) 100 units/mL subcutaneous injection 1-5 Units, HS    loratadine (CLARITIN) tablet 10 mg, Daily    metoprolol succinate (TOPROL-XL) 24 hr tablet 50 mg, Daily    montelukast (SINGULAIR) tablet 10 mg, HS    potassium chloride (Klor-Con M20) CR tablet 20 mEq, Once    potassium chloride (Klor-Con M20) CR tablet 40 mEq, BID    pregabalin (LYRICA) capsule 50 mg, Daily    saccharomyces boulardii (FLORASTOR) capsule 250 mg, BID    Insert peripheral IV, Once **AND** sodium chloride (PF) 0.9 % injection 3 mL, Q1H PRN    spironolactone (ALDACTONE) tablet 25 mg, Daily    umeclidinium 62.5 mcg/actuation inhaler AEPB 1 puff, Daily    Administrative Statements    Today, Patient Was Seen By: Enedina Gray PA-C      **Please Note: This note may have been constructed using a voice recognition system.**

## 2025-01-08 NOTE — ASSESSMENT & PLAN NOTE
Lab Results   Component Value Date    HGBA1C 7.0 (H) 12/04/2024       Recent Labs     01/07/25  1157 01/07/25  1619 01/07/25  2104 01/08/25  0819   POCGLU 200* 187* 203* 172*       Blood Sugar Average: Last 72 hrs:  (P) 185.4722020484917209  Outpatient regimen: sitagliptin, Jardiance (for CHF).  Currently on Jardiance IP   SSI with meals while hospitalized  Add Lantus QHS with persistent hyperglycemia   Hypoglycemic protocol

## 2025-01-09 ENCOUNTER — TRANSITIONAL CARE MANAGEMENT (OUTPATIENT)
Dept: FAMILY MEDICINE CLINIC | Facility: CLINIC | Age: 60
End: 2025-01-09

## 2025-01-09 ENCOUNTER — PATIENT OUTREACH (OUTPATIENT)
Dept: CARDIOLOGY CLINIC | Facility: CLINIC | Age: 60
End: 2025-01-09

## 2025-01-09 VITALS
WEIGHT: 315 LBS | BODY MASS INDEX: 41.56 KG/M2 | HEART RATE: 71 BPM | DIASTOLIC BLOOD PRESSURE: 77 MMHG | OXYGEN SATURATION: 99 % | TEMPERATURE: 97.3 F | RESPIRATION RATE: 16 BRPM | SYSTOLIC BLOOD PRESSURE: 129 MMHG

## 2025-01-09 PROBLEM — I50.33 ACUTE ON CHRONIC DIASTOLIC (CONGESTIVE) HEART FAILURE (HCC): Status: ACTIVE | Noted: 2025-01-04

## 2025-01-09 LAB
ANION GAP SERPL CALCULATED.3IONS-SCNC: 12 MMOL/L (ref 4–13)
BUN SERPL-MCNC: 36 MG/DL (ref 5–25)
CALCIUM SERPL-MCNC: 7.5 MG/DL (ref 8.4–10.2)
CHLORIDE SERPL-SCNC: 96 MMOL/L (ref 96–108)
CO2 SERPL-SCNC: 31 MMOL/L (ref 21–32)
CREAT SERPL-MCNC: 1.85 MG/DL (ref 0.6–1.3)
GFR SERPL CREATININE-BSD FRML MDRD: 39 ML/MIN/1.73SQ M
GLUCOSE SERPL-MCNC: 166 MG/DL (ref 65–140)
GLUCOSE SERPL-MCNC: 167 MG/DL (ref 65–140)
GLUCOSE SERPL-MCNC: 217 MG/DL (ref 65–140)
POTASSIUM SERPL-SCNC: 3.6 MMOL/L (ref 3.5–5.3)
SODIUM SERPL-SCNC: 139 MMOL/L (ref 135–147)

## 2025-01-09 PROCEDURE — 99238 HOSP IP/OBS DSCHRG MGMT 30/<: CPT | Performed by: PHYSICIAN ASSISTANT

## 2025-01-09 PROCEDURE — 82948 REAGENT STRIP/BLOOD GLUCOSE: CPT

## 2025-01-09 PROCEDURE — 99232 SBSQ HOSP IP/OBS MODERATE 35: CPT | Performed by: INTERNAL MEDICINE

## 2025-01-09 PROCEDURE — 80048 BASIC METABOLIC PNL TOTAL CA: CPT | Performed by: PHYSICIAN ASSISTANT

## 2025-01-09 RX ORDER — METOLAZONE 5 MG/1
5 TABLET ORAL ONCE
Status: DISCONTINUED | OUTPATIENT
Start: 2025-01-09 | End: 2025-01-09 | Stop reason: HOSPADM

## 2025-01-09 RX ORDER — POTASSIUM CHLORIDE 1500 MG/1
TABLET, EXTENDED RELEASE ORAL
Qty: 180 TABLET | Refills: 0 | Status: SHIPPED | OUTPATIENT
Start: 2025-01-09

## 2025-01-09 RX ORDER — POTASSIUM CHLORIDE 1500 MG/1
20 TABLET, EXTENDED RELEASE ORAL ONCE
Status: DISCONTINUED | OUTPATIENT
Start: 2025-01-09 | End: 2025-01-09 | Stop reason: HOSPADM

## 2025-01-09 RX ORDER — METOLAZONE 5 MG/1
5 TABLET ORAL DAILY
Status: DISCONTINUED | OUTPATIENT
Start: 2025-01-09 | End: 2025-01-09

## 2025-01-09 RX ADMIN — INSULIN LISPRO 1 UNITS: 100 INJECTION, SOLUTION INTRAVENOUS; SUBCUTANEOUS at 09:50

## 2025-01-09 RX ADMIN — APIXABAN 5 MG: 5 TABLET, FILM COATED ORAL at 09:50

## 2025-01-09 RX ADMIN — LORATADINE 10 MG: 10 TABLET ORAL at 09:50

## 2025-01-09 RX ADMIN — Medication 250 MG: at 09:49

## 2025-01-09 RX ADMIN — SPIRONOLACTONE 25 MG: 25 TABLET, FILM COATED ORAL at 09:50

## 2025-01-09 RX ADMIN — BUDESONIDE AND FORMOTEROL FUMARATE DIHYDRATE 2 PUFF: 160; 4.5 AEROSOL RESPIRATORY (INHALATION) at 09:49

## 2025-01-09 RX ADMIN — ATORVASTATIN CALCIUM 10 MG: 10 TABLET, FILM COATED ORAL at 09:50

## 2025-01-09 RX ADMIN — POTASSIUM CHLORIDE 40 MEQ: 1500 TABLET, EXTENDED RELEASE ORAL at 09:50

## 2025-01-09 RX ADMIN — CYANOCOBALAMIN TAB 500 MCG 1000 MCG: 500 TAB at 09:50

## 2025-01-09 RX ADMIN — METOPROLOL SUCCINATE 50 MG: 50 TABLET, EXTENDED RELEASE ORAL at 09:50

## 2025-01-09 RX ADMIN — PREGABALIN 50 MG: 50 CAPSULE ORAL at 09:50

## 2025-01-09 RX ADMIN — FLUTICASONE PROPIONATE 1 SPRAY: 50 SPRAY, METERED NASAL at 09:49

## 2025-01-09 RX ADMIN — FAMOTIDINE 20 MG: 20 TABLET, FILM COATED ORAL at 09:50

## 2025-01-09 RX ADMIN — EMPAGLIFLOZIN 10 MG: 10 TABLET, FILM COATED ORAL at 09:49

## 2025-01-09 RX ADMIN — UMECLIDINIUM 1 PUFF: 62.5 AEROSOL, POWDER ORAL at 09:49

## 2025-01-09 RX ADMIN — BUMETANIDE 6 MG: 2 TABLET ORAL at 00:22

## 2025-01-09 RX ADMIN — BUMETANIDE 6 MG: 2 TABLET ORAL at 09:49

## 2025-01-09 NOTE — ASSESSMENT & PLAN NOTE
Presented 1/4 with weight gain 7+ rounds, edema, worsening dyspnea on exertion.  No clear trigger  June 2024 echo with 55% EF  Home diuretics: Bumex 6 mg 3 times daily, metolazone 5 mg 3 times weekly  BNP 76, previously 250s to 400s  Weight now back at 315 pounds, patient not quite feeling back to baseline  2.9 L of urine last 24 hours, -2.6 L / 24 hours, net -5.5 L per admission    Plan  Discussed with attending  Continue Bumex 6mg TID  Resume metolazone 5 mg Tuesday/Thursday/Saturday  Increase potassium to 40meq qAM, 20meq every afternoon, 40meq qPM  Fluid restriction 1800 cc, 2 g sodium diet  Continue home meds: Metoprolol XL 50 mg daily, Aldactone 25 mg daily, Jardiance 10 mg daily  Should have short term follow up in cardiology office - sees Dr. Brenden LAUREANO for discharge from cardiology perspective

## 2025-01-09 NOTE — ASSESSMENT & PLAN NOTE
Lab Results   Component Value Date    EGFR 39 01/09/2025    EGFR 46 01/08/2025    EGFR 34 01/07/2025    CREATININE 1.85 (H) 01/09/2025    CREATININE 1.61 (H) 01/08/2025    CREATININE 2.07 (H) 01/07/2025     Baseline creatinine appears to be anywhere from 1.4-2, still within baseline

## 2025-01-09 NOTE — PROGRESS NOTES
Patient listed on Advanced Heart Failure Census at [facility].     Outpatient Advanced Heart Failure LCSW completed electronic chart review and rounded with the HF Team. HF Team would like more information on patient's habits such as eating, drinking, alcohol intake, and social stressors.    LCSW had previously received referral from PCP- [facility] in October 2024 for food insecurity however was unable to reach patient x2 so referral was closed.   LCSW took the opportunity today to assess need for resources in-person.    Met with patient, introduced self and role of Outpatient Advanced Heart Care . Pt consented to complete needs assessment.  Social Situation:  Pt resides with spouse who works for [employer]. Pt has 19yo dtr.  Pt is insured through spouse's employer. He applied for SSD about one year ago and was denied. Pt is working with an  who is also assisting pt with a Living Will.    Pt owns his car and drives self; IND with most things until his feet begin to hurt from diabetic neuropathy.    He was experiencing financial stress until he/spouse cut out spending in order to get caught up with bills. Pt declined any resources for rent/utility assist or food insecurity.    Pt declined any MH concerns and stated that he feels safe at home.    Nutrition:  Pt stated that his spouse is friends with dietician at [facility]. Spouse enjoys cooking and doesn't add salt to food. Pt said he limits his fluids to 2000mg/day.   Breakfast may include egg & cheese sandwich with glass of OJ.  Lunch - salad  Dinner - meatloaf & mashed potatoes, pasta, or salad with steak/chicken.     Spouse and dtr sometimes dine out however pt said he does not join them. He will eat what his spouse prepares for him or salads from [employer] but uses his own salad dressing (low salt) or oil/vinegar.     Tobacco:  Patient stopped smoking cigars 2-3 years ago.  No cigarettes.    Alcohol:  One glass of  wine on a holiday or special occasion.    Strengths:  Patient stated that he is Sabianism and his spouse attends [employer] weekly. Pt enjoys watching TV, games on his phone and spending time with friends.    LCSW updated HF Team.   Referral for outpatient social work not entered at this time.   Please enter referral if outpatient resources are needed in the future. This has been documented as One Time Outreach.

## 2025-01-09 NOTE — DISCHARGE INSTR - AVS FIRST PAGE
Continue prior diuretic regimen, however change potassium supplementation to 40 mEq in the morning, 20 mEq in the afternoon, and 40 mEq in the evening.    Follow-up with heart failure and primary care on discharge    Return for evaluation if you experience worsening weight gain, shortness of breath, swelling

## 2025-01-09 NOTE — PROGRESS NOTES
Progress Note - Heart Failure   Name: Mesfin Cano 59 y.o. male I MRN: [id number]  Unit/Bed#: -01 I Date of Admission: 1/4/2025   Date of Service: 1/9/2025 I Hospital Day: 5    Assessment & Plan  Acute on chronic diastolic heart failure (HCC)  Presented 1/4 with weight gain 7+ rounds, edema, worsening dyspnea on exertion.  No clear trigger  June 2024 echo with 55% EF  Home diuretics: Bumex 6 mg 3 times daily, metolazone 5 mg 3 times weekly  BNP 76, previously 250s to 400s  Weight now back at 315 pounds, patient not quite feeling back to baseline  2.9 L of urine last 24 hours, -2.6 L / 24 hours, net -5.5 L per admission    Plan  Discussed with attending  Continue Bumex 6mg TID  Resume metolazone 5 mg Tuesday/Thursday/Saturday  Increase potassium to 40meq qAM, 20meq every afternoon, 40meq qPM  Fluid restriction 1800 cc, 2 g sodium diet  Continue home meds: Metoprolol XL 50 mg daily, Aldactone 25 mg daily, Jardiance 10 mg daily  Should have short term follow up in cardiology office - sees Dr. Brenden LAUREANO for discharge from cardiology perspective  Primary hypertension  Continue meds as above  VICKY (obstructive sleep apnea)  Encouraged CPAP use  Going to see sleep medicine soon  Encouraged calling and making an appointment for soon after discharge  Paroxysmal atrial fibrillation (HCC)  Maintained on Eliquis  Stage 3b chronic kidney disease (HCC)  Lab Results   Component Value Date    EGFR 39 01/09/2025    EGFR 46 01/08/2025    EGFR 34 01/07/2025    CREATININE 1.85 (H) 01/09/2025    CREATININE 1.61 (H) 01/08/2025    CREATININE 2.07 (H) 01/07/2025     Baseline creatinine appears to be anywhere from 1.4-2, still within baseline  Liver lesion, left lobe      Subjective     Feeling well overall. He thinks his legs may look somewhat more puffy today than yesterday.     Objective :  Temp:  [97.3 °F (36.3 °C)-98.1 °F (36.7 °C)] 97.3 °F (36.3 °C)  HR:  [66-79] 71  BP: (119-134)/(71-77) 129/77  SpO2:  [95 %-100 %] 99 %  O2  Device: None (Room air)  Orthostatic Blood Pressures      Flowsheet Row Most Recent Value   Blood Pressure 129/77 filed at 01/09/2025 0721   Patient Position - Orthostatic VS Lying filed at 01/08/2025 1525          First Weight: Weight - Scale: (!) 147 kg (325 lb) (01/04/25 2013)  Vitals:    01/08/25 0533 01/09/25 0536   Weight: (!) 144 kg (317 lb 10.9 oz) (!) 143 kg (315 lb 0.6 oz)     Physical Exam  Constitutional:       General: He is not in acute distress.     Appearance: He is obese. He is not ill-appearing.   HENT:      Mouth/Throat:      Mouth: Mucous membranes are moist.      Pharynx: Oropharynx is clear.   Neck:      Comments: No JVP  Cardiovascular:      Rate and Rhythm: Normal rate and regular rhythm.      Heart sounds: No murmur heard.  Pulmonary:      Effort: Pulmonary effort is normal.      Breath sounds: Normal breath sounds. No rales.   Abdominal:      Palpations: Abdomen is soft.   Musculoskeletal:      Right lower leg: Edema (trace) present.      Left lower leg: Edema (trace) present.   Skin:     General: Skin is warm and dry.   Neurological:      Mental Status: He is alert.           Lab Results: I have reviewed the following results:  Results from last 7 days   Lab Units 01/05/25  0420 01/04/25  1544   WBC Thousand/uL 9.20 7.18   HEMOGLOBIN g/dL 12.8 13.3   HEMATOCRIT % 38.6 40.5   PLATELETS Thousands/uL 255 274     Results from last 7 days   Lab Units 01/09/25  0522 01/08/25  0500 01/07/25  0556   POTASSIUM mmol/L 3.6 3.4* 3.6   CHLORIDE mmol/L 96 96 94*   CO2 mmol/L 31 31 30   BUN mg/dL 36* 35* 37*   CREATININE mg/dL 1.85* 1.61* 2.07*   CALCIUM mg/dL 7.5* 7.7* 7.6*     Results from last 7 days   Lab Units 01/05/25  0420   INR  0.96   PTT seconds 29     Lab Results   Component Value Date    HGBA1C 7.0 (H) 12/04/2024     Lab Results   Component Value Date    TROPONINI <0.02 09/22/2021             VTE Pharmacologic Prophylaxis: VTE covered by:  apixaban, Oral, 5 mg at 01/08/25 1808

## 2025-01-10 ENCOUNTER — TELEPHONE (OUTPATIENT)
Dept: CARDIOLOGY CLINIC | Facility: CLINIC | Age: 60
End: 2025-01-10

## 2025-01-10 ENCOUNTER — PATIENT OUTREACH (OUTPATIENT)
Dept: CASE MANAGEMENT | Facility: OTHER | Age: 60
End: 2025-01-10

## 2025-01-10 DIAGNOSIS — Z71.89 COMPLEX CARE COORDINATION: Primary | ICD-10-CM

## 2025-01-10 NOTE — UTILIZATION REVIEW
NOTIFICATION OF ADMISSION DISCHARGE   This is a Notification of Discharge from [facility]. Please be advised that this patient has been discharge from our facility. Below you will find the admission and discharge date and time including the patient’s disposition.   UTILIZATION REVIEW CONTACT:  Bayron Candelaria  Utilization   Network Utilization Review Department  Phone: [phone] x carefully listen to the prompts. All voicemails are confidential.  Email: [email or web address]     ADMISSION INFORMATION  PRESENTATION DATE: 1/4/2025  3:29 PM  OBERVATION ADMISSION DATE: N/A  INPATIENT ADMISSION DATE: 1/4/25  8:42 PM   DISCHARGE DATE: 1/9/2025  2:23 PM   DISPOSITION:Home/Self Care    Network Utilization Review Department  ATTENTION: Please call with any questions or concerns to [phone] and carefully listen to the prompts so that you are directed to the right person. All voicemails are confidential.   For Discharge needs, contact Care Management DC Support Team at [phone] opt. 2  Send all requests for admission clinical reviews, approved or denied determinations and any other requests to dedicated fax number below belonging to the campus where the patient is receiving treatment. List of dedicated fax numbers for the Facilities:  FACILITY NAME UR FAX NUMBER   ADMISSION DENIALS (Administrative/Medical Necessity) [phone]   DISCHARGE SUPPORT TEAM ([facility]) [phone]   PARENT CHILD HEALTH (Maternity/NICU/Pediatrics) [phone]   [facility] [phone]   [facility] [phone]   [facility] [phone]   [facility] [phone]   [facility] [phone]   [facility] [phone]   [facility] [phone]   [facility] [phone]   [facility]  [facility] [phone]   [facility] [phone]   [facility] [phone]   [facility] [phone]

## 2025-01-10 NOTE — PROGRESS NOTES
Outpatient Care Management Note:  Outreach call attempted to Mr. Cano.  Message left for patient to please return call.  Contact information left on message.     Next outreach reminder sent.

## 2025-01-10 NOTE — PROGRESS NOTES
Outpatient Care Management Note:  in basket referral for complex care management received after recent hospitalization.  Chart review completed.     History includes Afib, HF, cardiomyopathy, HTN, VICKY, IBS, DM with recent A1C of 7, arthritis, CKD and hyperlipidemia.      1/4/2025-called provider due to 7 pound weight gain, bilateral lower extremity swelling and SOB.  ER recommended for in person exam.     1/4/2025-presented to ER with above symptoms. Verbalized multiple exacerbations of his CHF, most recently 11/2024.  Started on Bumex drip and transitioned to oral.  Discharged on Bumex TID, metolazone three times a week.  Cardiology appointment on 1/14/2025, needs to schedule with PCP.  Discharge weight 316.6 pounds.     Outreach reminder sent.

## 2025-01-10 NOTE — TELEPHONE ENCOUNTER
Patient admitted for CHF 1/4 - 1/9.    Self-management/education and teach back:  Primary learner: Self  Following low sodium diet: Yes  Following fluid restriction: Yes  Hospital discharge weight: 316 lb 5.8 oz  Weighing daily: Yes            Recording: Yes  1st home weight: 315 lb     Weight today: 315 lb  Monitoring symptoms: Yes  Any current symptoms: None  Knows when to call provider:   Medication reviewed and taking all as prescribed:  Knows name of diuretic: Yes  Escalation plan:   HF education reviewed/reinforced including low sodium diet, 64 oz fluid restriction, activity, symptoms of decompensation and when and who to call.     Care Coordination:  Aware of cardiology follow up appointment:   Aware of PCP follow up appointment:  Transportation:  Social Support:  Insurance/financial concerns:  Home health care:   Health literacy:  Engagement:  Personal Goal:  Additional comments:

## 2025-01-12 DIAGNOSIS — E78.2 MIXED HYPERLIPIDEMIA: ICD-10-CM

## 2025-01-13 ENCOUNTER — PATIENT OUTREACH (OUTPATIENT)
Dept: CASE MANAGEMENT | Facility: OTHER | Age: 60
End: 2025-01-13

## 2025-01-13 NOTE — PROGRESS NOTES
Outpatient Care Management Note:  Outreach call attempted to Mr. Cano after recent hospitalization.  Message left for patient to please return call.  Contact information left on message.     As this is the second unsuccessful outreach attempt, an [facility] letter is being sent to Mr. Cano's  MyChart per protocol.  If no response in two weeks, care management episode will be closed.     Follow up reminder sent.

## 2025-01-13 NOTE — LETTER
Date: 01/13/25    Dear Mesfin Cano,   My name is Judith Platt. I am a registered nurse from outpatient care management that works with [facility] Outpatient Care Management Department.    I have not been able to reach you and would like to set a time that I can talk with you regarding Care Management.   The Care Management Program is a free, voluntary program that you may opt out of at any time. The program is offered by [facility] Outpatient Care Management Department. We provide resources and education to assist in managing chronic illness as well as assisting with social needs, if any exist. Please call me with any questions you may have. I look forward to speaking with you.  Sincerely,  Judith Platt RN, BSN  [phone]  Outpatient Care Manager                    Care Management Program  PROGRAM DESCRIPTION:   The Care Management Program is an evidenced based program designed to provide you with the tools you need to make healthcare decisions.   Services offered in the program include the following:    Disease management    When you should follow up with your provider versus going to the emergency room    Help to navigate when transitioning from one care setting to another    Identifying barriers and developing patient goals    Communicates with your care team    Coordinating your care    Self-management action plans    Medication review    Connecting to community resources as needed   You are eligible for this program because you have a chronic condition. This free service, offered to you by your primary care office, can help you learn skills to manage your condition which may help you live a higher quality life.

## 2025-01-14 ENCOUNTER — TELEPHONE (OUTPATIENT)
Dept: CARDIOLOGY CLINIC | Facility: CLINIC | Age: 60
End: 2025-01-14

## 2025-01-14 ENCOUNTER — HOSPITAL ENCOUNTER (EMERGENCY)
Facility: HOSPITAL | Age: 60
Discharge: HOME | End: 2025-01-14
Attending: EMERGENCY MEDICINE | Admitting: EMERGENCY MEDICINE
Payer: COMMERCIAL

## 2025-01-14 VITALS
TEMPERATURE: 97.7 F | OXYGEN SATURATION: 100 % | HEART RATE: 83 BPM | DIASTOLIC BLOOD PRESSURE: 82 MMHG | SYSTOLIC BLOOD PRESSURE: 124 MMHG | RESPIRATION RATE: 20 BRPM

## 2025-01-14 DIAGNOSIS — R07.9 CHEST PAIN: ICD-10-CM

## 2025-01-14 DIAGNOSIS — J45.20 MILD INTERMITTENT ASTHMA WITHOUT COMPLICATION: ICD-10-CM

## 2025-01-14 DIAGNOSIS — E78.2 MIXED HYPERLIPIDEMIA: ICD-10-CM

## 2025-01-14 DIAGNOSIS — K85.10 ACUTE GALLSTONE PANCREATITIS: ICD-10-CM

## 2025-01-14 DIAGNOSIS — K80.50 CHOLEDOCHOLITHIASIS: ICD-10-CM

## 2025-01-14 DIAGNOSIS — S81.019A KNEE LACERATION: Primary | ICD-10-CM

## 2025-01-14 PROCEDURE — 99283 EMERGENCY DEPT VISIT LOW MDM: CPT

## 2025-01-14 PROCEDURE — 12002 RPR S/N/AX/GEN/TRNK2.6-7.5CM: CPT | Performed by: EMERGENCY MEDICINE

## 2025-01-14 PROCEDURE — 90715 TDAP VACCINE 7 YRS/> IM: CPT | Performed by: EMERGENCY MEDICINE

## 2025-01-14 PROCEDURE — 99284 EMERGENCY DEPT VISIT MOD MDM: CPT | Performed by: EMERGENCY MEDICINE

## 2025-01-14 PROCEDURE — 90471 IMMUNIZATION ADMIN: CPT

## 2025-01-14 RX ORDER — SENNOSIDES 8.6 MG
650 CAPSULE ORAL EVERY 8 HOURS PRN
Qty: 30 TABLET | Refills: 0 | Status: SHIPPED | OUTPATIENT
Start: 2025-01-14

## 2025-01-14 RX ORDER — ATORVASTATIN CALCIUM 10 MG/1
10 TABLET, FILM COATED ORAL DAILY
Qty: 30 TABLET | Refills: 0 | Status: SHIPPED | OUTPATIENT
Start: 2025-01-14

## 2025-01-14 RX ADMIN — TETANUS TOXOID, REDUCED DIPHTHERIA TOXOID AND ACELLULAR PERTUSSIS VACCINE, ADSORBED 0.5 ML: 5; 2.5; 8; 8; 2.5 SUSPENSION INTRAMUSCULAR at 11:56

## 2025-01-14 NOTE — ED ATTENDING ATTESTATION
1/14/2025  IEarl DO, saw and evaluated the patient. I have discussed the patient with the resident/non-physician practitioner and agree with the resident's/non-physician practitioner's findings, Plan of Care, and MDM as documented in the resident's/non-physician practitioner's note, except where noted. All available labs and Radiology studies were reviewed.  I was present for key portions of any procedure(s) performed by the resident/non-physician practitioner and I was immediately available to provide assistance.       At this point I agree with the current assessment done in the Emergency Department.  I have conducted an independent evaluation of this patient a history and physical is as follows:    59-year-old male presents for evaluation of a gaping laceration to his right proximal shin after losing his footing stepping onto a curb and landing on it.  He denies other complaint, though he did strike his thighs as well.  He has no other external signs of trauma.  He denies hitting his head or having loss of consciousness.  He denies any prodromal symptoms causing him to trip or fall.  He does not take any anticoagulant or antiplatelet medications.  Bleeding was controlled prior to arrival.  He does not know his last tetanus update.    ROS: Denies associated visual change, LH/dizziness, HA, midline neck or back pain, CP, SOB, abdominal pain, n/v. 12 system ROS o/w negative.    PE: NAD, appears comfortable, alert, GCS 15; PERRL, EOMI; no hemotympanum; no septal hematoma or epistaxis; MMM, no post OP exudate, edema or erythema, no dental trauma; no midline vert TTP, no crepitus or step-offs; HRR, no murmur; lungs CTA w/o w/r/r, POx 100% on RA (nl); abd s/nt/nd, nl BS in all four quadrants; (-) LE edema, no calf TTP, stable pelvis, FROM extremities x4, strength and sensation appear intact; skin on right shin has an approximately 7 cm, horizontal, gaping but nonbleeding laceration to his right proximal shin  without evidence of retained foreign bodies, skin is otherwise p/w/d; CN II-XII GI/NF, oriented.    MDM/DDx: Fall with right shin laceration without evidence of retained foreign body or underlying fracture.    A/P: Will cleanse and close wound, update tetanus, reevaluate for further work up and disposition.    ED Course         Critical Care Time  [place] Protocol:  Consent: The procedure was performed in an emergent situation. Verbal consent obtained. Written consent not obtained.  Risks and benefits: risks, benefits and alternatives were discussed  Consent given by: patient  Patient understanding: patient states understanding of the procedure being performed  Patient consent: the patient's understanding of the procedure matches consent given  Patient identity confirmed: verbally with patient and arm band  Laceration repair    Date/Time: 1/14/2025 1:00 PM    Performed by: Earl Bazzi DO  Authorized by: Earl Bazzi DO  Body area: lower extremity  Location details: right lower leg  Laceration length: 7 cm  Foreign bodies: no foreign bodies  Tendon involvement: none  Nerve involvement: none  Vascular damage: no  Anesthesia: local infiltration    Anesthesia:  Local Anesthetic: lidocaine 2% without epinephrine  Anesthetic total: 4 mL    Sedation:  Patient sedated: no      Wound Dehiscence:  Superficial Wound Dehiscence: simple closure      Procedure Details:  Preparation: Patient was prepped and draped in the usual sterile fashion.  Irrigation solution: saline  Irrigation method: syringe  Amount of cleaning: standard  Debridement: none  Degree of undermining: minimal  Skin closure: 4-0 nylon  Number of sutures: 12  Technique: simple  Approximation: close  Approximation difficulty: simple  Patient tolerance: patient tolerated the procedure well with no immediate complications

## 2025-01-14 NOTE — DISCHARGE INSTRUCTIONS
-Please follow up with primary care provider, ED, or urgent care to remove suture in 1 week  -Return if redness, swelling, or new/worsening pain develops around site

## 2025-01-14 NOTE — ED PROVIDER NOTES
ED Disposition       None          Assessment & Plan       Medical Decision Making  Assessment:  -Superficial laceration on inferior right knee after patient was taking out trash on the way to his car  -Is not aware of tetanus vaccination within the last 5 years    Plan:  -Washout and closure done in the ED  -Plan for suture removal in 7 days  -Sent home with pain regimen  -Tetanus shot given     Risk  Prescription drug management.             Medications   tetanus-diphtheria-acellular pertussis (BOOSTRIX) IM injection 0.5 mL (0.5 mL Intramuscular Given 1/14/25 1156)       ED Risk Strat Scores                                              History of Present Illness       Chief Complaint   Patient presents with    Laceration     Tripped over step and fell onto concrete onto right knee. Has laceration to right knee. -HS -LOC. On eliquis.        Past Medical History:   Diagnosis Date    Abnormal stress test 06/26/2024    Acute gastritis without hemorrhage     last assessed 3/24/17    Asthma     Atrial fibrillation (HCC)     Bilateral leg edema 02/19/2020    CHF (congestive heart failure) (HCC)     Coronary artery disease     Daytime sleepiness 07/17/2019    Diabetes mellitus (HCC)     Dyspnea on exertion 10/11/2021    Edema of both feet 01/23/2020    Electrolyte abnormality 10/05/2024    Gastric bypass status for obesity     Hyperlipidemia     Hypertension     Hypokalemia 11/14/2021    Impaired fasting glucose     last assessed 5/10/17    Knee sprain, bilateral 06/14/2018    Leg cramps 06/16/2022    Obesity     Uncontrolled atrial flutter (HCC) 06/18/2024    Viral gastroenteritis     last assessed 11/4/16      Past Surgical History:   Procedure Laterality Date    CARDIAC CATHETERIZATION N/A 3/9/2022    Procedure: CARDIAC RHC W/ PRESSURE SENSOR;  Surgeon: Aminata Wilcox MD;  Location: [facility];  Service: Cardiology    CARDIAC CATHETERIZATION N/A 9/6/2022    Procedure: Cardiac catheterization;  Surgeon:  Yolanda Del Rosario DO;  Location: [facility];  Service: Cardiology    CARDIAC CATHETERIZATION N/A 9/6/2022    Procedure: Cardiac Coronary Angiogram;  Surgeon: Yolanda Del Rosario DO;  Location: [facility];  Service: Cardiology    CARDIAC CATHETERIZATION N/A 10/11/2023    Procedure: Cardiac RHC;  Surgeon: Yoel Darden MD;  Location: [facility];  Service: Cardiology    CARPAL TUNNEL RELEASE      bilateral    CHOLECYSTECTOMY LAPAROSCOPIC N/A 2/7/2024    Procedure: CHOLECYSTECTOMY LAPAROSCOPIC;  Surgeon: Nena Ferguson MD;  Location: [facility];  Service: General    GASTRIC BYPASS  2004    with han en y    HERNIA REPAIR      ventral inscisional    IR BIOPSY BONE MARROW  12/14/2023    LAPAROSCOPIC TRANSGASTRIC ACCESS FOR ERCP N/A 2/7/2024    Procedure: LAPAROSCOPIC TRANSGASTRIC ACCESS FOR ERCP;  Surgeon: Nena Ferguson MD;  Location: [facility];  Service: General    LAPAROSCOPIC TRANSGASTRIC ACCESS FOR ERCP N/A 2/7/2024    Procedure: ERCP, sphincterotomy, stone extraction;  Surgeon: Rey Ferguson MD;  Location: [facility];  Service: Gastroenterology    TONSILLECTOMY        Family History   Problem Relation Age of Onset    Stroke Mother     Hypertension Father     Cancer Father     COPD Father       Social History     Tobacco Use    Smoking status: Former     Current packs/day: 1.00     Average packs/day: 1 pack/day for 5.0 years (5.0 ttl pk-yrs)     Types: Pipe, Cigars, Cigarettes     Start date: 2016     Quit date: 1/1/2021     Passive exposure: Never    Smokeless tobacco: Former    Tobacco comments:     cigar once a day   Vaping Use    Vaping status: Never Used   Substance Use Topics    Alcohol use: Yes     Alcohol/week: 2.0 standard drinks of alcohol     Types: 2 Shots of liquor per week     Comment: social    Drug use: No      E-Cigarette/Vaping    E-Cigarette Use Never User       E-Cigarette/Vaping Substances    Nicotine No     THC No     CBD No     Flavoring No     Other No      Unknown No       I have reviewed and agree with the history as documented.     The patient is a 59-year-old male with a history of type 2 diabetes with polyneuropathy, asthma, osteoarthritis, VICKY, paroxysmal A-fib on Eliquis, hypertension, hyperlipidemia who presents after a fall on his concrete steps on the way to take out his trash this morning.  He denies hitting his head or any other portion of his body.  He denies losing consciousness, he denies chest pain, shortness of breath, nausea or vomiting.      Laceration      Review of Systems   Respiratory:  Negative for shortness of breath.    Cardiovascular:  Positive for leg swelling. Negative for chest pain.   Gastrointestinal:  Negative for abdominal pain, nausea and vomiting.   Skin:  Positive for wound.   Neurological:  Negative for dizziness, syncope and numbness.           Objective       ED Triage Vitals   Temperature Pulse Blood Pressure Respirations SpO2 Patient Position - Orthostatic VS   01/14/25 1010 01/14/25 1009 01/14/25 1009 01/14/25 1009 01/14/25 1009 01/14/25 1009   97.7 °F (36.5 °C) 83 124/82 20 100 % Sitting      Temp Source Heart Rate Source BP Location FiO2 (%) Pain Score    01/14/25 1010 01/14/25 1009 01/14/25 1009 -- 01/14/25 1009    Oral Monitor Left arm  5      Vitals      Date and Time Temp Pulse SpO2 Resp BP Pain Score FACES Pain Rating User   01/14/25 1010 97.7 °F (36.5 °C) -- -- -- -- -- -- ST   01/14/25 1009 -- 83 100 % 20 124/82 5 -- ST            Physical Exam  Cardiovascular:      Rate and Rhythm: Normal rate. Rhythm irregular.      Heart sounds: No murmur heard.  Pulmonary:      Effort: Pulmonary effort is normal. No respiratory distress.      Breath sounds: Normal breath sounds.   Musculoskeletal:      Right lower leg: Edema present.      Left lower leg: Edema present.   Skin:     General: Skin is warm and dry.      Capillary Refill: Capillary refill takes less than 2 seconds.      Coloration: Skin is not jaundiced or pale.    Neurological:      Mental Status: He is alert and oriented to person, place, and time.         Results Reviewed       None            No orders to display       Procedures    ED Medication and Procedure Management   Prior to Admission Medications   Prescriptions Last Dose Informant Patient Reported? Taking?   Accu-Chek FastClix Lancets MISC  Self No No   Sig: Test blood sugar twice daily   Patient taking differently: Takes blood sugar three times a week provider aware   Blood Glucose Monitoring Suppl (Accu-Chek Guide) w/Device KIT  Self No No   Sig: Use 2 (two) times a day Test blood sugar twice daily   Empagliflozin (JARDIANCE) 10 MG TABS tablet  Self No No   Sig: Take 1 tablet (10 mg total) by mouth daily   acetaminophen (TYLENOL) 325 mg tablet  Self No No   Sig: Take 2 tablets (650 mg total) by mouth every 6 (six) hours as needed for mild pain, headaches or fever   albuterol (PROVENTIL HFA,VENTOLIN HFA) 90 mcg/act inhaler  Self No No   Sig: INHALE 2 PUFFS EVERY 4 HOURS AS NEEDED FOR WHEEZING OR SHORTNESS OF BREATH   aluminum-magnesium hydroxide 200-200 MG/5ML suspension  Self No No   Sig: Take 5 mL by mouth every 6 (six) hours as needed for heartburn   apixaban (Eliquis) 5 mg   No No   Sig: Take 1 tablet (5 mg total) by mouth 2 (two) times a day   atorvastatin (LIPITOR) 10 mg tablet   No No   Sig: TAKE 1 TABLET BY MOUTH EVERY DAY   budesonide-formoterol (Symbicort) 160-4.5 mcg/act inhaler  Self No No   Sig: Inhale 2 puffs 2 (two) times a day Rinse mouth after use.   bumetanide (BUMEX) 2 mg tablet  Self No No   Sig: Take 3 tablets (6 mg total) by mouth 3 (three) times a day   cyanocobalamin (VITAMIN B-12) 1000 MCG tablet  Self No No   Sig: Take 1 tablet (1,000 mcg total) by mouth daily   famotidine (PEPCID) 20 mg tablet  Self No No   Sig: Take 1 tablet (20 mg total) by mouth if needed for heartburn As needed twice a day   fluticasone (FLONASE) 50 mcg/act nasal spray  Self No No   Si spray into each nostril 2  (two) times a day   loratadine (CLARITIN) 10 mg tablet  Self No No   Sig: Take 1 tablet (10 mg total) by mouth if needed for allergies As needed daily   metolazone (ZAROXOLYN) 2.5 mg tablet   No No   Sig: Take 2 tablets (5 mg total) by mouth 3 (three) times a week Take 20-30 minutes before Bumex dose and with additional potassium   metoprolol succinate (TOPROL-XL) 50 mg 24 hr tablet   No No   Sig: TAKE 1 TABLET BY MOUTH EVERY DAY   montelukast (SINGULAIR) 10 mg tablet  Self No No   Sig: Take 1 tablet (10 mg total) by mouth daily at bedtime   nitroglycerin (NITROSTAT) 0.4 mg SL tablet  Self No No   Sig: Place 1 tablet (0.4 mg total) under the tongue every 5 (five) minutes as needed for chest pain for up to 50 doses   potassium chloride (Klor-Con M20) 20 mEq tablet   No No   Sig: Take 40 mEq in the morning, 20 mEq in the afternoon, and 40 mEq in the evening   pregabalin (LYRICA) 50 mg capsule  Self No No   Sig: Take 1 caps. at bedtime   sitaGLIPtin (Januvia) 100 mg tablet  Self No No   Sig: TAKE 1/2 TABLET BY MOUTH EVERY DAY   sodium chloride (OCEAN) 0.65 % nasal spray   No No   Si spray into each nostril every hour as needed for congestion   spironolactone (ALDACTONE) 25 mg tablet   No No   Sig: Take 1 tablet (25 mg total) by mouth daily   tiotropium (Spiriva Respimat) 1.25 MCG/ACT AERS inhaler  Self No No   Sig: Inhale 2 puffs daily      Facility-Administered Medications: None     Patient's Medications   Discharge Prescriptions    No medications on file     No discharge procedures on file.  ED SEPSIS DOCUMENTATION            Robert Ramirez MD  [phone]

## 2025-01-14 NOTE — TELEPHONE ENCOUNTER
Patient called stating he fell this AM outside & is now in the ED, needing sutures to his knee.    Stated he will call office to reschedule.

## 2025-01-15 DIAGNOSIS — J45.20 MILD INTERMITTENT ASTHMA WITHOUT COMPLICATION: ICD-10-CM

## 2025-01-15 DIAGNOSIS — N18.32 TYPE 2 DIABETES MELLITUS WITH STAGE 3B CHRONIC KIDNEY DISEASE, WITHOUT LONG-TERM CURRENT USE OF INSULIN (HCC): ICD-10-CM

## 2025-01-15 DIAGNOSIS — J45.41 MODERATE PERSISTENT ASTHMA WITH ACUTE EXACERBATION: ICD-10-CM

## 2025-01-15 DIAGNOSIS — E11.22 TYPE 2 DIABETES MELLITUS WITH STAGE 3B CHRONIC KIDNEY DISEASE, WITHOUT LONG-TERM CURRENT USE OF INSULIN (HCC): ICD-10-CM

## 2025-01-15 RX ORDER — ALBUTEROL SULFATE 90 UG/1
2 INHALANT RESPIRATORY (INHALATION) EVERY 4 HOURS PRN
Qty: 18 G | Refills: 0 | Status: SHIPPED | OUTPATIENT
Start: 2025-01-15

## 2025-01-16 RX ORDER — TIOTROPIUM BROMIDE INHALATION SPRAY 1.56 UG/1
2 SPRAY, METERED RESPIRATORY (INHALATION) DAILY
Qty: 4 G | Refills: 5 | Status: SHIPPED | OUTPATIENT
Start: 2025-01-16 | End: 2025-02-15

## 2025-01-17 ENCOUNTER — TELEPHONE (OUTPATIENT)
Dept: CARDIOLOGY CLINIC | Facility: CLINIC | Age: 60
End: 2025-01-17

## 2025-01-20 DIAGNOSIS — I50.32 CHRONIC HEART FAILURE WITH PRESERVED EJECTION FRACTION (HCC): Primary | ICD-10-CM

## 2025-01-20 RX ORDER — BUMETANIDE 2 MG/1
6 TABLET ORAL 3 TIMES DAILY
Qty: 270 TABLET | Refills: 4 | Status: SHIPPED | OUTPATIENT
Start: 2025-01-20

## 2025-01-20 NOTE — TELEPHONE ENCOUNTER
Patient returned call stated he is feeling good, not experiencing any CHF symptoms currently.    Today's Wt - 315 lb    Cardio Mems readings.  1/17 - 12 1/18 - 13 1/19 - 12 1/20 - 13    Patient requested a Bumex refill.

## 2025-01-26 ENCOUNTER — OFFICE VISIT (OUTPATIENT)
Dept: URGENT CARE | Age: 60
End: 2025-01-26
Payer: COMMERCIAL

## 2025-01-26 VITALS
HEART RATE: 78 BPM | TEMPERATURE: 97.8 F | DIASTOLIC BLOOD PRESSURE: 74 MMHG | OXYGEN SATURATION: 98 % | RESPIRATION RATE: 18 BRPM | SYSTOLIC BLOOD PRESSURE: 116 MMHG

## 2025-01-26 DIAGNOSIS — T14.8XXA WOUND INFECTION: Primary | ICD-10-CM

## 2025-01-26 DIAGNOSIS — L08.9 WOUND INFECTION: Primary | ICD-10-CM

## 2025-01-26 PROCEDURE — S9083 URGENT CARE CENTER GLOBAL: HCPCS

## 2025-01-26 PROCEDURE — G0383 LEV 4 HOSP TYPE B ED VISIT: HCPCS

## 2025-01-26 PROCEDURE — 87077 CULTURE AEROBIC IDENTIFY: CPT

## 2025-01-26 PROCEDURE — 87205 SMEAR GRAM STAIN: CPT

## 2025-01-26 PROCEDURE — 87070 CULTURE OTHR SPECIMN AEROBIC: CPT

## 2025-01-26 RX ORDER — CEPHALEXIN 500 MG/1
500 CAPSULE ORAL EVERY 8 HOURS SCHEDULED
Qty: 21 CAPSULE | Refills: 0 | Status: SHIPPED | OUTPATIENT
Start: 2025-01-26 | End: 2025-02-02

## 2025-01-26 RX ORDER — DOXYCYCLINE 100 MG/1
100 TABLET ORAL 2 TIMES DAILY
Qty: 14 TABLET | Refills: 0 | Status: SHIPPED | OUTPATIENT
Start: 2025-01-26 | End: 2025-02-02

## 2025-01-26 NOTE — PROGRESS NOTES
Assessment/Plan  Wound culture results pending.  Please begin antibiotics as directed.   Please follow up with Wound Care as directed.   Go to emergency department for fever, chills, joint pain, worsening symptoms.     Wound infection [T14.8XXA, L08.9]  1. Wound infection  doxycycline (ADOXA) 100 MG tablet    cephalexin (KEFLEX) 500 mg capsule    Ambulatory Referral to Wound Care      Patient Education     Laceration Infection Discharge Instructions   About this topic   A laceration is a cut or tear in your body on your skin, muscle, or tissue. It can occur anywhere there is soft tissue. The doctor cleaned out your cut and then closed it with one of these ways.  Special skin glue that holds the wound edges together  Strips of special adhesive tape, called steri-strips  Use a special type of thread called stitches to close some wounds. Some stitches need to be taken out after the wound heals. Others melt away or dissolve as the wound heals.  Special metal staples  A combination of these methods  You may develop an infection even though your cut was cleaned. The skin around your cut may be red, raised, and hurt when you touch it. Pus may develop deep in the infection. The doctor may need to drain the pus.       What care is needed at home?   Ask your doctor what you need to do when you go home. Make sure you understand everything the doctor says. This way you will know what you need to do.  Talk to your doctor about how to care for your cut site. Ask your doctor about:  When you should change your bandages  When you may take a bath or shower  If you need to be careful with lifting things over 10 pounds (4.5 kg)  When you may go back to your normal activities like work or driving  Be sure to wash your hands before and after touching your wound or dressing.  What follow-up care is needed?   Your doctor may ask you to make visits to the office to check on your progress. Be sure to keep these visits. Your doctor may  give you a tetanus shot if needed.  What drugs may be needed?   The doctor may order drugs to:  Help with pain  Prevent or fight an infection  Will physical activity be limited?   You may have to limit your activity. Talk to your doctor about the right amount of activity for you.  What problems could happen?   Infection of the bloodstream, bone, other body organs, or nearby tissue  When do I need to call the doctor?   Signs of infection. These include a fever of 100.4°F (38°C) or higher, chills, wound that will not heal.  Wound opens up  More redness, drainage, warmth, or odor at the cut site  You are not feeling better in 2 to 3 days or you are feeling worse  Teach Back: Helping You Understand   The Teach Back Method helps you understand the information we are giving you. After you talk with the staff, tell them in your own words what you learned. This helps to make sure the staff has described each thing clearly. It also helps to explain things that may have been confusing. Before going home, make sure you can do these:  I can tell you about my condition.  I can tell you how to care for my cut site.  I can tell you what I will do if I have a fever, chills, or my wound will not heal.  Last Reviewed Date   2021-05-06  Consumer Information Use and Disclaimer   This generalized information is a limited summary of diagnosis, treatment, and/or medication information. It is not meant to be comprehensive and should be used as a tool to help the user understand and/or assess potential diagnostic and treatment options. It does NOT include all information about conditions, treatments, medications, side effects, or risks that may apply to a specific patient. It is not intended to be medical advice or a substitute for the medical advice, diagnosis, or treatment of a health care provider based on the health care provider's examination and assessment of a patient’s specific and unique circumstances. Patients must speak with a  "health care provider for complete information about their health, medical questions, and treatment options, including any risks or benefits regarding use of medications. This information does not endorse any treatments or medications as safe, effective, or approved for treating a specific patient. [employer] and its affiliates disclaim any warranty or liability relating to this information or the use thereof. The use of this information is governed by the Terms of Use, available at https://www.[employer].com/en/know/clinical-effectiveness-terms   Copyright   Copyright © 2024 [employer] and its affiliates and/or licensors. All rights reserved.      Subjective:     Patient ID: Mesfin Cano is a 59 y.o. male.      Reason For Visit / Chief Complaint  Chief Complaint   Patient presents with    Suture / Staple Removal     Onset Pt states he needs his sutures removed on right leg below knee, states the guaze is stuck to the sutures, also states there is a foul smelling odor from the area.          Patient is a 59 year old male with PMH significant for diabetes mellitus, HTN, asthma, atrial fibrillation , CHF, who presents with wife for evaluation of right knee laceration which occurred on 01/14/2025 and for suture removal. Patient reports that the wound was sutured in the ED the same day, and reports that he has left the same dressing in place since the injury. He has not visualized the wound, and reports that he has been cleansing the skin above and below the ace wrap that was placed over the dressing. He denies fever, chills, joint pain. He does note some bloody drainage and notes that the dressing appears \"stuck\" to the wound.           Past Medical History:   Diagnosis Date    Abnormal stress test 06/26/2024    Acute gastritis without hemorrhage     last assessed 3/24/17    Asthma     Atrial fibrillation (HCC)     Bilateral leg edema 02/19/2020    CHF (congestive heart failure) (HCC)     Coronary artery " disease     Daytime sleepiness 07/17/2019    Diabetes mellitus (HCC)     Dyspnea on exertion 10/11/2021    Edema of both feet 01/23/2020    Electrolyte abnormality 10/05/2024    Gastric bypass status for obesity     Hyperlipidemia     Hypertension     Hypokalemia 11/14/2021    Impaired fasting glucose     last assessed 5/10/17    Knee sprain, bilateral 06/14/2018    Leg cramps 06/16/2022    Obesity     Uncontrolled atrial flutter (HCC) 06/18/2024    Viral gastroenteritis     last assessed 11/4/16       Past Surgical History:   Procedure Laterality Date    CARDIAC CATHETERIZATION N/A 3/9/2022    Procedure: CARDIAC RHC W/ PRESSURE SENSOR;  Surgeon: Aminata Wilcox MD;  Location: [facility];  Service: Cardiology    CARDIAC CATHETERIZATION N/A 9/6/2022    Procedure: Cardiac catheterization;  Surgeon: Yolanda Del Rosario DO;  Location: [facility];  Service: Cardiology    CARDIAC CATHETERIZATION N/A 9/6/2022    Procedure: Cardiac Coronary Angiogram;  Surgeon: Yolanda Del Rosario DO;  Location: [facility];  Service: Cardiology    CARDIAC CATHETERIZATION N/A 10/11/2023    Procedure: Cardiac RHC;  Surgeon: Yoel Darden MD;  Location: [facility];  Service: Cardiology    CARPAL TUNNEL RELEASE      bilateral    CHOLECYSTECTOMY LAPAROSCOPIC N/A 2/7/2024    Procedure: CHOLECYSTECTOMY LAPAROSCOPIC;  Surgeon: Nena Ferguson MD;  Location: [facility];  Service: General    GASTRIC BYPASS  2004    with han en y    HERNIA REPAIR      ventral inscisional    IR BIOPSY BONE MARROW  12/14/2023    LAPAROSCOPIC TRANSGASTRIC ACCESS FOR ERCP N/A 2/7/2024    Procedure: LAPAROSCOPIC TRANSGASTRIC ACCESS FOR ERCP;  Surgeon: Nena Ferguson MD;  Location: [facility];  Service: General    LAPAROSCOPIC TRANSGASTRIC ACCESS FOR ERCP N/A 2/7/2024    Procedure: ERCP, sphincterotomy, stone extraction;  Surgeon: Rey Ferguson MD;  Location: [facility];  Service: Gastroenterology    TONSILLECTOMY         Family  History   Problem Relation Age of Onset    Stroke Mother     Hypertension Father     Cancer Father     COPD Father        Review of Systems   Constitutional:  Negative for fatigue and fever.   HENT:  Negative for congestion, ear discharge, ear pain, postnasal drip, rhinorrhea, sinus pressure, sinus pain, sneezing and sore throat.    Eyes: Negative.  Negative for pain, discharge, redness and itching.   Respiratory: Negative.  Negative for apnea, cough, choking, chest tightness, shortness of breath, wheezing and stridor.    Cardiovascular: Negative.  Negative for chest pain and palpitations.   Gastrointestinal: Negative.  Negative for diarrhea, nausea and vomiting.   Endocrine: Negative.  Negative for polydipsia, polyphagia and polyuria.   Genitourinary: Negative.  Negative for decreased urine volume and flank pain.   Musculoskeletal: Negative.  Negative for arthralgias, back pain, myalgias, neck pain and neck stiffness.   Skin:  Positive for wound. Negative for color change, pallor and rash.   Allergic/Immunologic: Negative.  Negative for environmental allergies.   Neurological: Negative.  Negative for dizziness, facial asymmetry, light-headedness, numbness and headaches.   Hematological: Negative.  Negative for adenopathy.   Psychiatric/Behavioral: Negative.         Objective:    /74   Pulse 78   Temp 97.8 °F (36.6 °C)   Resp 18   SpO2 98%     Physical Exam  Vitals reviewed.   Constitutional:       General: He is not in acute distress.     Appearance: Normal appearance. He is not ill-appearing, toxic-appearing or diaphoretic.      Interventions: He is not intubated.  HENT:      Head: Normocephalic and atraumatic.      Right Ear: Tympanic membrane, ear canal and external ear normal. There is no impacted cerumen.      Left Ear: Tympanic membrane, ear canal and external ear normal. There is no impacted cerumen.      Nose: Nose normal. No congestion or rhinorrhea.      Mouth/Throat:      Mouth: Mucous  membranes are moist.      Pharynx: Oropharynx is clear. Uvula midline. No pharyngeal swelling, oropharyngeal exudate, posterior oropharyngeal erythema or uvula swelling.      Tonsils: No tonsillar exudate or tonsillar abscesses. 1+ on the right. 1+ on the left.   Eyes:      Extraocular Movements: Extraocular movements intact.      Conjunctiva/sclera: Conjunctivae normal.      Pupils: Pupils are equal, round, and reactive to light.   Cardiovascular:      Rate and Rhythm: Normal rate and regular rhythm.      Pulses: Normal pulses.      Heart sounds: Normal heart sounds, S1 normal and S2 normal. Heart sounds not distant. No murmur heard.     No friction rub. No gallop.   Pulmonary:      Effort: Pulmonary effort is normal. No tachypnea, bradypnea, accessory muscle usage, prolonged expiration, respiratory distress or retractions. He is not intubated.      Breath sounds: Normal breath sounds. No stridor, decreased air movement or transmitted upper airway sounds. No decreased breath sounds, wheezing, rhonchi or rales.   Chest:      Chest wall: No tenderness.   Musculoskeletal:         General: Normal range of motion.      Cervical back: Normal range of motion and neck supple. No rigidity or tenderness.   Lymphadenopathy:      Cervical: No cervical adenopathy.   Skin:     General: Skin is warm and dry.      Capillary Refill: Capillary refill takes less than 2 seconds.      Findings: Laceration and wound present. No erythema.             Comments: There is an approximately 10 cm horizontal laceration of the right knee with extensive infection. It is not possible to visualize all sutures, and the wound appears to have dehisced in the center. There is copious purulent, foul-smelling drainage. Localized erythema and swelling without streaking. Full ROM of knee on exam.    Neurological:      General: No focal deficit present.      Mental Status: He is alert.   Psychiatric:         Mood and Affect: Mood normal.   Suture  removal    Date/Time: 1/26/2025 11:30 AM    Performed by: RODRIGUE Shah  Authorized by: RODRIGUE Shah  Universal Protocol:  procedure performed by consultantConsent: Verbal consent obtained.  Risks and benefits: risks, benefits and alternatives were discussed  Consent given by: patient  Patient understanding: patient states understanding of the procedure being performed  Patient identity confirmed: verbally with patient and provided demographic data      Patient location:  Clinic  Location:     Laterality:  Right    Location:  Lower extremity    Lower extremity location:  Knee    Knee location:  R knee  Procedure details:     Tools used:  Suture removal kit    Wound appearance:  Purulent, tender and red    Number of sutures removed:  3  Post-procedure details:     Complication (if applicable):  Due to extensive wound infection, unable to completely remove all sutures. Wound culture obtained.

## 2025-01-26 NOTE — PATIENT INSTRUCTIONS
Wound culture results pending.  Please begin antibiotics as directed.   Please follow up with Wound Care as directed.   Go to emergency department for fever, chills, joint pain, worsening symptoms.

## 2025-01-27 ENCOUNTER — PATIENT OUTREACH (OUTPATIENT)
Dept: CASE MANAGEMENT | Facility: OTHER | Age: 60
End: 2025-01-27

## 2025-01-29 LAB
BACTERIA WND AEROBE CULT: ABNORMAL
BACTERIA WND AEROBE CULT: ABNORMAL
GRAM STN SPEC: ABNORMAL

## 2025-01-31 ENCOUNTER — OFFICE VISIT (OUTPATIENT)
Dept: WOUND CARE | Facility: HOSPITAL | Age: 60
End: 2025-01-31
Payer: COMMERCIAL

## 2025-01-31 ENCOUNTER — TELEPHONE (OUTPATIENT)
Dept: CARDIOLOGY CLINIC | Facility: CLINIC | Age: 60
End: 2025-01-31

## 2025-01-31 VITALS
SYSTOLIC BLOOD PRESSURE: 150 MMHG | RESPIRATION RATE: 20 BRPM | HEIGHT: 73 IN | WEIGHT: 315 LBS | DIASTOLIC BLOOD PRESSURE: 82 MMHG | BODY MASS INDEX: 41.75 KG/M2 | HEART RATE: 95 BPM | TEMPERATURE: 95.7 F

## 2025-01-31 DIAGNOSIS — S81.801D TRAUMATIC OPEN WOUND OF RIGHT LOWER LEG WITH DELAYED HEALING: Primary | ICD-10-CM

## 2025-01-31 DIAGNOSIS — E11.65 UNCONTROLLED TYPE 2 DIABETES MELLITUS WITH HYPERGLYCEMIA (HCC): ICD-10-CM

## 2025-01-31 PROCEDURE — 99213 OFFICE O/P EST LOW 20 MIN: CPT | Performed by: FAMILY MEDICINE

## 2025-01-31 PROCEDURE — 11042 DBRDMT SUBQ TIS 1ST 20SQCM/<: CPT | Performed by: FAMILY MEDICINE

## 2025-01-31 RX ORDER — LIDOCAINE 40 MG/G
CREAM TOPICAL ONCE
Status: COMPLETED | OUTPATIENT
Start: 2025-01-31 | End: 2025-01-31

## 2025-01-31 RX ADMIN — LIDOCAINE: 40 CREAM TOPICAL at 14:37

## 2025-01-31 NOTE — PROGRESS NOTES
Wound Procedure Treatment Traumatic Anterior;Right Knee    Performed by: Maame Maloney RN  Authorized by: Latonya Snider MD    Associated wounds:   Wound 01/31/25 Traumatic Laceration Knee Anterior;Right  Wound cleansed with:  NSS  Applied primary dressing:  Gelling fiber and Silver  Applied secondary dressing:  ABD  Dressing secured with:  Bobby, Tape and Compression wrap    ACE

## 2025-01-31 NOTE — PROGRESS NOTES
Patient ID: Mesfin Cano is a 59 y.o. male Date of Birth 1965       Chief Complaint   Patient presents with   • New Patient Visit     Right knee wound         Allergies:  Azithromycin and Bactrim [sulfamethoxazole-trimethoprim]    Diagnosis:      Diagnosis ICD-10-CM Associated Orders   1. Traumatic open wound of right lower leg with delayed healing  S81.801D Wound cleansing and dressings Traumatic Anterior;Right Knee     lidocaine (LMX) 4 % cream     Wound Procedure Treatment Traumatic Anterior;Right Knee      2. Uncontrolled type 2 diabetes mellitus with hyperglycemia (HCC)  E11.65                 Assessment & Plan:  Open traumatic wound right knee/lower extremity: Large wound just below knee.  Partial closure at lateral edge with dehiscence of attempted closure moving medially.  Wound bed with slough and fibrinous debris.  No purulence or malodor.  Loose fragments of sutures remain present within wound bed.  Periwound with bruising and stages of resolution and remnants of what appears to be horizontal mattress suture though unclear as some areas of suture were cut out per patient at urgent care prior to wound center presentation.  Local mild edema present without induration, fluctuance, crepitus.  No increased warmth or lymphangitic streaking.  No purulence or malodor.  Surgical debridement with removal of remaining suture material.  Silver gelling fiber to wound bed followed by gauze and Ace bandage  Recommend avoiding excessive ambulation and bending of the knee.  Recommend he elevate lower extremity level of his heart whenever he is resting  ER visit, urgent care visit reviewed.  Recommend patient continue to take antibiotics as prescribed by urgent care as he has been improving and has around 2 days remaining  T2DM:  A1C results reviewed with the patient today. 7.0 as of December 2024.  Controlled at this time but has a history of being uncontrolled and continues to work on this.  Reviewed extreme  "importance of very close monitoring of wounds. Discussed that diabetes places patients at increased risk for wound complications despite adequate cleansing and care.  Should patient experience any acute change or any of the following symptoms including but not limited to fever, chills, increased pain at the wound, swelling, purulent drainage, foul odor, etc... to immediately proceed to emergency department. Pt expresses understanding.   Follow-up in 1 week or sooner if needed    Portions of the record may have been created with voice recognition software. Occasional wrong word or \"sound alike\" substitutions may have occurred due to the inherent limitations of voice recognition software. Read the chart carefully and recognize, using context, where substitutions have occurred.    Subjective:   1/31/2025: Mr. Cano is a 59-year-old male with a past medical history including but not limited to type 2 diabetes, A-fib on AC with Eliquis, pretension, CHF who presents to the wound center today for evaluation of right knee wound.  He reports that on 1/14/2025 went to ER due to injury he sustained while taking the trash out -fell on his concrete steps.  At that ER visit, sutures were placed.  Per patient he was told to keep it covered for 7 to 10 days so he did not remove the dressing and thought his wound is getting infected so went to urgent care on 1/26/2025.  Wound culture was collected and patient was placed on Keflex and doxycycline.  He reports they removed some sutures but not all of them.  He was referred to wound care.      The following portions of the patient's history were reviewed and updated as appropriate:   Patient Active Problem List   Diagnosis   • Irritable bowel syndrome with diarrhea   • Primary hypertension   • Primary osteoarthritis of both knees   • Hyperlipidemia   • Morbid obesity (HCC)   • Vitamin B12 deficiency   • Vitamin D deficiency   • VICKY (obstructive sleep apnea)   • Paroxysmal atrial " fibrillation (HCC)   • HNP (herniated nucleus pulposus), lumbar   • Foraminal stenosis of lumbar region   • Primary osteoarthritis of right knee   • Stage 3b chronic kidney disease (HCC)   • Diabetic polyneuropathy associated with type 2 diabetes mellitus (HCC)   • Chronic heart failure with preserved ejection fraction (HFpEF, >= 50%) (HCC)   • Loose stools   • Presence of CardioMEMS HF system   • Anemia due to chronic kidney disease   • Eosinophilia   • Uncontrolled type 2 diabetes mellitus with hyperglycemia (HCC)   • Status post cholecystectomy   • Moderate persistent asthma without complication   • Anemia   • Onychomycosis   • Iron deficiency   • Acute on chronic diastolic heart failure (HCC)   • Serum total bilirubin elevated   • Liver lesion, left lobe     Past Medical History:   Diagnosis Date   • Abnormal stress test 06/26/2024   • Acute gastritis without hemorrhage     last assessed 3/24/17   • Asthma    • Atrial fibrillation (HCC)    • Bilateral leg edema 02/19/2020   • CHF (congestive heart failure) (HCC)    • Coronary artery disease    • Daytime sleepiness 07/17/2019   • Diabetes mellitus (HCC)    • Dyspnea on exertion 10/11/2021   • Edema of both feet 01/23/2020   • Electrolyte abnormality 10/05/2024   • Gastric bypass status for obesity    • Hyperlipidemia    • Hypertension    • Hypokalemia 11/14/2021   • Impaired fasting glucose     last assessed 5/10/17   • Knee sprain, bilateral 06/14/2018   • Leg cramps 06/16/2022   • Obesity    • Uncontrolled atrial flutter (HCC) 06/18/2024   • Viral gastroenteritis     last assessed 11/4/16     Past Surgical History:   Procedure Laterality Date   • CARDIAC CATHETERIZATION N/A 3/9/2022    Procedure: CARDIAC RHC W/ PRESSURE SENSOR;  Surgeon: Aminata Wilcox MD;  [facility]: [facility];  Service: Cardiology   • CARDIAC CATHETERIZATION N/A 9/6/2022    Procedure: Cardiac catheterization;  Surgeon: Yolanda Del Rosario DO;  [facility];   Service: Cardiology   • CARDIAC CATHETERIZATION N/A 9/6/2022    Procedure: Cardiac Coronary Angiogram;  Surgeon: Yolanda Del Rosario DO;  [facility];  Service: Cardiology   • CARDIAC CATHETERIZATION N/A 10/11/2023    Procedure: Cardiac RHC;  Surgeon: Yoel Darden MD;  [facility];  Service: Cardiology   • CARPAL TUNNEL RELEASE      bilateral   • CHOLECYSTECTOMY LAPAROSCOPIC N/A 2/7/2024    Procedure: CHOLECYSTECTOMY LAPAROSCOPIC;  Surgeon: Nena Ferguson MD;  Location: [facility];  Service: General   • GASTRIC BYPASS  2004    with han en y   • HERNIA REPAIR      ventral inscisional   • IR BIOPSY BONE MARROW  12/14/2023   • LAPAROSCOPIC TRANSGASTRIC ACCESS FOR ERCP N/A 2/7/2024    Procedure: LAPAROSCOPIC TRANSGASTRIC ACCESS FOR ERCP;  Surgeon: Nena Ferguson MD;  [facility];  Service: General   • LAPAROSCOPIC TRANSGASTRIC ACCESS FOR ERCP N/A 2/7/2024    Procedure: ERCP, sphincterotomy, stone extraction;  Surgeon: Rey Ferguson MD;  Location: [facility];  Service: Gastroenterology   • TONSILLECTOMY       Family History   Problem Relation Age of Onset   • Stroke Mother    • Hypertension Father    • Cancer Father    • COPD Father       Social History     Socioeconomic History   • Marital status: /Civil Union     Spouse name: Not on file   • Number of children: Not on file   • Years of education: Not on file   • Highest education level: Not on file   Occupational History   • Not on file   Tobacco Use   • Smoking status: Former     Current packs/day: 1.00     Average packs/day: 1 pack/day for 5.0 years (5.0 ttl pk-yrs)     Types: Pipe, Cigars, Cigarettes     Start date: 2016     Quit date: 1/1/2021     Passive exposure: Never   • Smokeless tobacco: Former   • Tobacco comments:     cigar once a day   Vaping Use   • Vaping status: Never Used   Substance and Sexual Activity   • Alcohol use: Yes     Alcohol/week: 2.0 standard drinks of alcohol     Types: 2 Shots  of liquor per week     Comment: social   • Drug use: No   • Sexual activity: Yes     Partners: Female     Birth control/protection: None   Other Topics Concern   • Not on file   Social History Narrative   • Not on file     Social Drivers of Health     Financial Resource Strain: Not on file   Food Insecurity: No Food Insecurity (2024)    Nursing - Inadequate Food Risk Classification    • Worried About Running Out of Food in the Last Year: Sometimes true    • Ran Out of Food in the Last Year: Never true    • Ran Out of Food in the Last Year: Never true   Recent Concern: Food Insecurity - Food Insecurity Present (10/5/2024)    Nursing - Inadequate Food Risk Classification    • Worried About Running Out of Food in the Last Year: Sometimes true    • Ran Out of Food in the Last Year: Never true    • Ran Out of Food in the Last Year: Not on file   Transportation Needs: No Transportation Needs (2024)    Nursing - Transportation Risk Classification    • Lack of Transportation: Not on file    • Lack of Transportation: No   Physical Activity: Not on file   Stress: Not on file   Social Connections: Not on file   Intimate Partner Violence: Unknown (2024)    Nursing IPS    • Feels Physically and Emotionally Safe: Not on file    • Physically Hurt by Someone: Not on file    • Humiliated or Emotionally Abused by Someone: Not on file    • Physically Hurt by Someone: No    • Hurt or Threatened by Someone: No   Housing Stability: Unknown (2024)    Nursing: Inadequate Housing Risk Classification    • Has Housing: Not on file    • Worried About Losing Housing: Not on file    • Unable to Get Utilities: Not on file    • Unable to Pay for Housing in the Last Year: No    • Has Housin        Current Outpatient Medications:   •  Accu-Chek FastClix Lancets MISC, Test blood sugar twice daily (Patient taking differently: Takes blood sugar three times a week provider aware), Disp: 200 each, Rfl: 3  •  acetaminophen  (TYLENOL) 325 mg tablet, Take 2 tablets (650 mg total) by mouth every 6 (six) hours as needed for mild pain, headaches or fever, Disp: , Rfl:   •  acetaminophen (TYLENOL) 650 mg CR tablet, Take 1 tablet (650 mg total) by mouth every 8 (eight) hours as needed for mild pain, Disp: 30 tablet, Rfl: 0  •  albuterol (PROVENTIL HFA,VENTOLIN HFA) 90 mcg/act inhaler, Inhale 2 puffs every 4 (four) hours as needed for wheezing, Disp: 18 g, Rfl: 0  •  apixaban (Eliquis) 5 mg, Take 1 tablet (5 mg total) by mouth 2 (two) times a day, Disp: 180 tablet, Rfl: 0  •  atorvastatin (LIPITOR) 10 mg tablet, TAKE 1 TABLET BY MOUTH EVERY DAY, Disp: 30 tablet, Rfl: 0  •  Blood Glucose Monitoring Suppl (Accu-Chek Guide) w/Device KIT, Use 2 (two) times a day Test blood sugar twice daily, Disp: 1 kit, Rfl: 0  •  budesonide-formoterol (Symbicort) 160-4.5 mcg/act inhaler, Inhale 2 puffs 2 (two) times a day Rinse mouth after use., Disp: 30.6 g, Rfl: 2  •  bumetanide (BUMEX) 2 mg tablet, Take 3 tablets (6 mg total) by mouth 3 (three) times a day, Disp: 270 tablet, Rfl: 4  •  cephalexin (KEFLEX) 500 mg capsule, Take 1 capsule (500 mg total) by mouth every 8 (eight) hours for 7 days, Disp: 21 capsule, Rfl: 0  •  cyanocobalamin (VITAMIN B-12) 1000 MCG tablet, Take 1 tablet (1,000 mcg total) by mouth daily, Disp: 30 tablet, Rfl: 0  •  doxycycline (ADOXA) 100 MG tablet, Take 1 tablet (100 mg total) by mouth 2 (two) times a day for 7 days, Disp: 14 tablet, Rfl: 0  •  Empagliflozin (JARDIANCE) 10 MG TABS tablet, Take 1 tablet (10 mg total) by mouth daily, Disp: 30 tablet, Rfl: 11  •  famotidine (PEPCID) 20 mg tablet, Take 1 tablet (20 mg total) by mouth if needed for heartburn As needed twice a day, Disp: , Rfl:   •  fluticasone (FLONASE) 50 mcg/act nasal spray, 1 spray into each nostril 2 (two) times a day, Disp: , Rfl: 0  •  loratadine (CLARITIN) 10 mg tablet, Take 1 tablet (10 mg total) by mouth if needed for allergies As needed daily, Disp: , Rfl:   •  " metolazone (ZAROXOLYN) 2.5 mg tablet, Take 2 tablets (5 mg total) by mouth 3 (three) times a week Take 20-30 minutes before Bumex dose and with additional potassium, Disp: 180 tablet, Rfl: 0  •  metoprolol succinate (TOPROL-XL) 50 mg 24 hr tablet, TAKE 1 TABLET BY MOUTH EVERY DAY, Disp: 90 tablet, Rfl: 1  •  montelukast (SINGULAIR) 10 mg tablet, Take 1 tablet (10 mg total) by mouth daily at bedtime, Disp: 90 tablet, Rfl: 1  •  nitroglycerin (NITROSTAT) 0.4 mg SL tablet, Place 1 tablet (0.4 mg total) under the tongue every 5 (five) minutes as needed for chest pain for up to 50 doses, Disp: 50 tablet, Rfl: 0  •  potassium chloride (Klor-Con M20) 20 mEq tablet, Take 40 mEq in the morning, 20 mEq in the afternoon, and 40 mEq in the evening, Disp: 180 tablet, Rfl: 0  •  pregabalin (LYRICA) 50 mg capsule, Take 1 caps. at bedtime, Disp: 30 capsule, Rfl: 5  •  sitaGLIPtin (Januvia) 100 mg tablet, TAKE 1/2 TABLET BY MOUTH EVERY DAY, Disp: 15 tablet, Rfl: 5  •  sodium chloride (OCEAN) 0.65 % nasal spray, 1 spray into each nostril every hour as needed for congestion, Disp: 37.5 mL, Rfl: 0  •  spironolactone (ALDACTONE) 25 mg tablet, Take 1 tablet (25 mg total) by mouth daily, Disp: 30 tablet, Rfl: 0  •  tiotropium (Spiriva Respimat) 1.25 MCG/ACT AERS inhaler, Inhale 2 puffs daily, Disp: 4 g, Rfl: 5  No current facility-administered medications for this visit.    Review of Systems   Constitutional:  Negative for chills, fever and unexpected weight change.   Respiratory:  Negative for shortness of breath.    Cardiovascular:  Positive for leg swelling. Negative for chest pain and palpitations.   Skin:  Positive for wound.   Psychiatric/Behavioral:  The patient is not nervous/anxious.        Objective:  /82   Pulse 95   Temp (!) 95.7 °F (35.4 °C)   Resp 20   Ht 6' 1\" (1.854 m)   Wt (!) 143 kg (315 lb)   BMI 41.56 kg/m²         Physical Exam  Vitals and nursing note reviewed.   Constitutional:       General: He is not " in acute distress.     Appearance: He is not ill-appearing, toxic-appearing or diaphoretic.   Eyes:      General: No scleral icterus.  Cardiovascular:      Comments: Diminished but palpable DP/PT right lower extremity  Pulmonary:      Effort: Pulmonary effort is normal. No respiratory distress.   Musculoskeletal:      Right lower leg: Edema present.   Skin:     General: Skin is warm.      Findings: Wound present.             Comments: 1- Open traumatic wound right knee/lower extremity: Large wound just below knee.  Partial closure at lateral edge with dehiscence of attempted closure moving medially.  Wound bed with slough and fibrinous debris.  No purulence or malodor.  Loose fragments of sutures remain present within wound bed.  Periwound with bruising and stages of resolution and remnants of what appears to be horizontal mattress suture though unclear as some areas of suture were cut out per patient at urgent care prior to wound center presentation.  Local mild edema present without induration, fluctuance, crepitus.  No increased warmth or lymphangitic streaking.  No purulence or malodor.   Neurological:      Mental Status: He is alert and oriented to person, place, and time.   Psychiatric:         Mood and Affect: Mood normal.         Behavior: Behavior normal.           Wound 01/31/25 Traumatic Laceration Knee Anterior;Right (Active)   Wound Image   01/31/25 1430   Wound Description Granulation tissue;Epithelialization;Pink;Yellow;Slough;Eschar;Black;Brown 01/31/25 1427   Sena-wound Assessment Pink;Purple;Dry;Scaly 01/31/25 1427   Wound Length (cm) 1.1 cm 01/31/25 1427   Wound Width (cm) 6.6 cm 01/31/25 1427   Wound Depth (cm) 0.3 cm 01/31/25 1427   Wound Surface Area (cm^2) 7.26 cm^2 01/31/25 1427   Wound Volume (cm^3) 2.178 cm^3 01/31/25 1427   Calculated Wound Volume (cm^3) 2.18 cm^3 01/31/25 1427   Drainage Amount Moderate 01/31/25 1427   Drainage Description Serosanguineous;Brown 01/31/25 1427   Non-staged  Wound Description Full thickness 01/31/25 1427   Dressing Status Intact 01/31/25 1427         Results from last 6 Months   Lab Units 01/26/25  1834   WOUND CULTURE  1+ Growth of  2+ Growth of - Actinomyces europaeus -  Actinomyces species*         Lab Results   Component Value Date    HGBA1C 7.0 (H) 12/04/2024       Wound Instructions:  Orders Placed This Encounter   Procedures   • Wound cleansing and dressings Traumatic Anterior;Right Knee     Wound location right knee wound.    Change dressing 3x per week and as needed for strikethrough drainage.     Sponge bathe only, do not shower at this time.  Cleanse the wound with wound cleanser or mild soap and water, rinse, pat dry.  Apply gelling fiber with silver to the wound. (Sent with patient)  Cover with gauze pad or ABD.   Secure with roll gauze and tape then ACE bandage.      Dr. Snider used silver nitrate to cauterize the wound after debridement.   This may give the wound and drainage a temporary grayish discoloration.       Supplies ordered today through [facility].  The supplies will be delivered in 2 to 3 business days once your insurance is verified.   [facility] ph 8 .     Standing Status:   Future     Expiration Date:   2/7/2025   • Wound Procedure Treatment Traumatic Anterior;Right Knee     This order was created via procedure documentation     Laotnya Snider MD   bandage.      Dr. Snider used silver nitrate to cauterize the wound after debridement.   This may give the wound and drainage a temporary grayish discoloration.       Supplies ordered today through [facility].  The supplies will be delivered in 2 to 3 business days once your insurance is verified.   [facility] ph 6 .     Standing Status:   Future     Expiration Date:   2/7/2025    Wound Procedure Treatment Traumatic Anterior;Right Knee     This order was created via procedure documentation     Latonya Snider MD

## 2025-01-31 NOTE — PATIENT INSTRUCTIONS
Orders Placed This Encounter   Procedures    Wound cleansing and dressings Traumatic Anterior;Right Knee     Wound location right knee wound.    Change dressing 3x per week and as needed for strikethrough drainage.     Sponge bathe only, do not shower at this time.  Cleanse the wound with wound cleanser or mild soap and water, rinse, pat dry.  Apply gelling fiber with silver to the wound. (Sent with patient)  Cover with gauze pad or ABD.   Secure with roll gauze and tape then ACE bandage.      Dr. Snider used silver nitrate to cauterize the wound after debridement.   This may give the wound and drainage a temporary grayish discoloration.       Supplies ordered today through [facility].  The supplies will be delivered in 2 to 3 business days once your insurance is verified.   [facility] ph 3 .     Standing Status:   Future     Expiration Date:   2/7/2025

## 2025-01-31 NOTE — TELEPHONE ENCOUNTER
Called patient who stated he has been doing good except for the wound on his leg.   Stated he has had decrease in swelling, better breathing.  Today's  Wt - 315 lb  Stated his weight has been about the same CarioMems readings have been on or 1 point from setting of 12.

## 2025-02-03 ENCOUNTER — CLINICAL SUPPORT (OUTPATIENT)
Dept: CARDIOLOGY CLINIC | Facility: CLINIC | Age: 60
End: 2025-02-03
Payer: COMMERCIAL

## 2025-02-03 DIAGNOSIS — I50.32 CHRONIC HEART FAILURE WITH PRESERVED EJECTION FRACTION (HCC): Primary | ICD-10-CM

## 2025-02-03 PROCEDURE — 93264 REM MNTR WRLS P-ART PRS SNR: CPT | Performed by: INTERNAL MEDICINE

## 2025-02-07 ENCOUNTER — TELEPHONE (OUTPATIENT)
Dept: CARDIOLOGY CLINIC | Facility: CLINIC | Age: 60
End: 2025-02-07

## 2025-02-07 NOTE — TELEPHONE ENCOUNTER
Called patient & left a message & c/b number requesting he return call to office.  CardioMems Readings:  2/1 - None  2/2 - 13  2/3 - 12  2/4 - 12  2/5 - 15  2/6 - 14  2/7 - None

## 2025-02-08 DIAGNOSIS — E78.2 MIXED HYPERLIPIDEMIA: ICD-10-CM

## 2025-02-11 ENCOUNTER — TELEPHONE (OUTPATIENT)
Dept: CARDIOLOGY CLINIC | Facility: CLINIC | Age: 60
End: 2025-02-11

## 2025-02-11 RX ORDER — ATORVASTATIN CALCIUM 10 MG/1
10 TABLET, FILM COATED ORAL DAILY
Qty: 30 TABLET | Refills: 5 | Status: SHIPPED | OUTPATIENT
Start: 2025-02-11

## 2025-02-11 NOTE — TELEPHONE ENCOUNTER
Patient called office stating he would like to make a f/u OV since he missed his appointment after being DC from the hospital. Scheduled patient for an OV with RODRIGUE David on 3/6.    Patient stated he is feeling really good.  Today's Wt - 312 lb.  Denied edema to lower extremities or abdominal bloating. Baseline SOB.    CardioMems  2/8 - 12 2/9 - None  2/10 - 13 2/11 - None  2/12 - 14    Reviewed when to call office with CHF symptoms/weight gain. Continue taking CardioMems readings. Following 2 G sodium diet.

## 2025-02-15 DIAGNOSIS — I50.32 CHRONIC HEART FAILURE WITH PRESERVED EJECTION FRACTION (HCC): ICD-10-CM

## 2025-02-17 RX ORDER — BUMETANIDE 2 MG/1
6 TABLET ORAL 3 TIMES DAILY
Qty: 810 TABLET | Refills: 1 | Status: SHIPPED | OUTPATIENT
Start: 2025-02-17

## 2025-03-04 ENCOUNTER — TELEPHONE (OUTPATIENT)
Dept: CARDIOLOGY CLINIC | Facility: CLINIC | Age: 60
End: 2025-03-04

## 2025-03-04 DIAGNOSIS — E11.42 DIABETIC POLYNEUROPATHY ASSOCIATED WITH TYPE 2 DIABETES MELLITUS (HCC): ICD-10-CM

## 2025-03-04 DIAGNOSIS — I50.32 CHRONIC HEART FAILURE WITH PRESERVED EJECTION FRACTION (HCC): Primary | ICD-10-CM

## 2025-03-04 RX ORDER — PREGABALIN 50 MG/1
CAPSULE ORAL
Qty: 90 CAPSULE | Refills: 1 | Status: ON HOLD | OUTPATIENT
Start: 2025-03-04

## 2025-03-04 NOTE — TELEPHONE ENCOUNTER
Patient called asking if he need to get blood work before Thursday's OV with Capri. Last lab work done on     Patient stated he is stressed & grieving since most recently losing his dear friend who is like a brother to him & will attend his his  tomorrow.    Otherwise, patient stated he is feeling well.  Today's Wt - 315 lb  Denied any increase in edema to lower extremities. Baseline SOB. Taking all cardiac medications as ordered.  Cardio Mems readings:  3/4 - 17  3/3 - 12  3/2 - 16  3/1 - None   -  - 15  2/26 -  -     Do you want patient to get lab work?    Please advise.

## 2025-03-04 NOTE — TELEPHONE ENCOUNTER
Patient is transferring providers from Dr. Chavez to Beatriz Reddy. Appointment scheduled fro 3/10/25. Patient is completely out of pregabalin (LYRICA) 50 mg capsule . Needs doctor sign off on medication.    Medication: pregabalin (LYRICA) 50 mg capsule     Dose/Frequency:  Take 1 caps. at bedtime, Normal     Quantity: 30    Pharmacy: [facility]/pharmacy #[id number] - [place], [place] - [address] [place] [address]     Office:   [x] PCP/Provider -   [] Speciality/Provider -     Does the patient have enough for 3 days?   [] Yes   [x] No - Send as HP to POD

## 2025-03-04 NOTE — TELEPHONE ENCOUNTER
Returned call to patient who identified himself on the recording. Left a detailed message with c/b number. Stated patient can go for lab work tomorrow morning as per Capri. Requested for patient to return call to office with any further questions or concerns.

## 2025-03-05 ENCOUNTER — CLINICAL SUPPORT (OUTPATIENT)
Dept: CARDIOLOGY CLINIC | Facility: CLINIC | Age: 60
End: 2025-03-05

## 2025-03-05 DIAGNOSIS — I50.32 CHRONIC HEART FAILURE WITH PRESERVED EJECTION FRACTION (HCC): Primary | ICD-10-CM

## 2025-03-07 ENCOUNTER — RA CDI HCC (OUTPATIENT)
Dept: OTHER | Facility: HOSPITAL | Age: 60
End: 2025-03-07

## 2025-03-10 ENCOUNTER — OFFICE VISIT (OUTPATIENT)
Dept: FAMILY MEDICINE CLINIC | Facility: CLINIC | Age: 60
End: 2025-03-10
Payer: COMMERCIAL

## 2025-03-10 VITALS
OXYGEN SATURATION: 96 % | WEIGHT: 315 LBS | BODY MASS INDEX: 41.75 KG/M2 | RESPIRATION RATE: 18 BRPM | HEART RATE: 104 BPM | DIASTOLIC BLOOD PRESSURE: 70 MMHG | SYSTOLIC BLOOD PRESSURE: 126 MMHG | HEIGHT: 73 IN | TEMPERATURE: 98 F

## 2025-03-10 DIAGNOSIS — N18.32 STAGE 3B CHRONIC KIDNEY DISEASE (HCC): ICD-10-CM

## 2025-03-10 DIAGNOSIS — E11.65 UNCONTROLLED TYPE 2 DIABETES MELLITUS WITH HYPERGLYCEMIA (HCC): Primary | ICD-10-CM

## 2025-03-10 DIAGNOSIS — Z12.5 PROSTATE CANCER SCREENING: ICD-10-CM

## 2025-03-10 DIAGNOSIS — E66.01 MORBID OBESITY DUE TO EXCESS CALORIES (HCC): Chronic | ICD-10-CM

## 2025-03-10 DIAGNOSIS — I48.0 PAROXYSMAL ATRIAL FIBRILLATION (HCC): Chronic | ICD-10-CM

## 2025-03-10 DIAGNOSIS — M17.0 PRIMARY OSTEOARTHRITIS OF BOTH KNEES: Chronic | ICD-10-CM

## 2025-03-10 DIAGNOSIS — E61.1 IRON DEFICIENCY: ICD-10-CM

## 2025-03-10 DIAGNOSIS — N18.31 ANEMIA DUE TO STAGE 3A CHRONIC KIDNEY DISEASE  (HCC): ICD-10-CM

## 2025-03-10 DIAGNOSIS — D63.1 ANEMIA DUE TO STAGE 3A CHRONIC KIDNEY DISEASE  (HCC): ICD-10-CM

## 2025-03-10 DIAGNOSIS — J45.41 MODERATE PERSISTENT ASTHMA WITH ACUTE EXACERBATION: ICD-10-CM

## 2025-03-10 DIAGNOSIS — E11.42 DIABETIC POLYNEUROPATHY ASSOCIATED WITH TYPE 2 DIABETES MELLITUS (HCC): ICD-10-CM

## 2025-03-10 DIAGNOSIS — K76.9 LIVER LESION, LEFT LOBE: ICD-10-CM

## 2025-03-10 DIAGNOSIS — E78.2 MIXED HYPERLIPIDEMIA: Chronic | ICD-10-CM

## 2025-03-10 DIAGNOSIS — R17 SERUM TOTAL BILIRUBIN ELEVATED: ICD-10-CM

## 2025-03-10 DIAGNOSIS — E11.9 TYPE 2 DIABETES MELLITUS WITHOUT COMPLICATION, WITHOUT LONG-TERM CURRENT USE OF INSULIN (HCC): ICD-10-CM

## 2025-03-10 DIAGNOSIS — I50.32 CHRONIC HEART FAILURE WITH PRESERVED EJECTION FRACTION (HFPEF, >= 50%) (HCC): Chronic | ICD-10-CM

## 2025-03-10 DIAGNOSIS — I10 PRIMARY HYPERTENSION: ICD-10-CM

## 2025-03-10 DIAGNOSIS — G47.33 OSA (OBSTRUCTIVE SLEEP APNEA): Chronic | ICD-10-CM

## 2025-03-10 PROBLEM — Z90.49 STATUS POST CHOLECYSTECTOMY: Status: RESOLVED | Noted: 2024-02-17 | Resolved: 2025-03-10

## 2025-03-10 PROBLEM — I50.33 ACUTE ON CHRONIC DIASTOLIC HEART FAILURE (HCC): Status: RESOLVED | Noted: 2025-01-04 | Resolved: 2025-03-10

## 2025-03-10 PROBLEM — M51.26 HNP (HERNIATED NUCLEUS PULPOSUS), LUMBAR: Chronic | Status: RESOLVED | Noted: 2021-02-23 | Resolved: 2025-03-10

## 2025-03-10 LAB — SL AMB POCT HEMOGLOBIN AIC: 6.3 (ref ?–6.5)

## 2025-03-10 PROCEDURE — 83036 HEMOGLOBIN GLYCOSYLATED A1C: CPT | Performed by: NURSE PRACTITIONER

## 2025-03-10 PROCEDURE — 99396 PREV VISIT EST AGE 40-64: CPT | Performed by: NURSE PRACTITIONER

## 2025-03-10 RX ORDER — BUDESONIDE AND FORMOTEROL FUMARATE DIHYDRATE 160; 4.5 UG/1; UG/1
2 AEROSOL RESPIRATORY (INHALATION) 2 TIMES DAILY
Qty: 30.6 G | Refills: 2 | Status: SHIPPED | OUTPATIENT
Start: 2025-03-10

## 2025-03-10 NOTE — ASSESSMENT & PLAN NOTE
Lab Results   Component Value Date    HGBA1C 6.3 03/10/2025     Hemoglobin A1c in the office today is 6.3.  He remains on his Januvia.  Diabetic foot exam performed today.  He is making an upcoming appointment with podiatry.  He is overdue for an eye exam and will schedule that.  Valence blood work ordered.

## 2025-03-10 NOTE — ASSESSMENT & PLAN NOTE
1/4/2025  LIVER/BILIARY TREE: Low-attenuation 0.6 cm lesion in the left lobe may represent a cyst.

## 2025-03-10 NOTE — ASSESSMENT & PLAN NOTE
Lab Results   Component Value Date    EGFR 39 01/09/2025    EGFR 46 01/08/2025    EGFR 34 01/07/2025    CREATININE 1.85 (H) 01/09/2025    CREATININE 1.61 (H) 01/08/2025    CREATININE 2.07 (H) 01/07/2025     Patient follows with nephrology however is due for a follow-up appointment.  I did remind him to schedule this appointment.  Orders:    Comprehensive metabolic panel; Future

## 2025-03-10 NOTE — PATIENT INSTRUCTIONS
"Patient Education     Carb counting for adults with diabetes   The Basics   Written by the doctors and editors at [facility]   What is carb counting? -- This is a type of meal planning that many people with diabetes use. It is a way to figure out how many carbohydrates, or \"carbs,\" you eat.  The body breaks down the food we eat into 3 main types of nutrients: carbs, proteins, and fats. Carbs are sugars and starches that come from food. The body uses carbs for energy.  Why do I need to count carbs? -- People with diabetes need to pay attention to how many carbs they eat. This is because carbs raise your blood sugar level.  Carb counting helps you:   Choose the right amount of insulin to take before meals and snacks - If you take insulin before meals, the dose depends on several things, including how many carbs you plan to eat. (It also depends on how much you plan to exercise and your blood sugar level.)   Plan your meals and snacks for the day - You can use carb counting to figure out how many carbs to eat at each meal and snack. This helps you make sure that you eat the right amount over the entire day.   Keep your blood sugar levels well managed - Spreading out the carbs you eat over a whole day can help keep your blood sugar from getting too high. If you take insulin or another diabetes medicine that can cause low blood sugar, eating about the same amount of carbs at each meal every day also helps keep your blood sugar from getting too low. Reducing the amount of carbs you eat can help you manage your diabetes better and prevent medical problems that diabetes can cause.  Your doctor, nurse, or dietitian (food expert) can help you figure out how many carbs to try to eat each day. This will depend on your eating habits, weight, activity level, and which diabetes medicines you take.  People who take insulin before meals might need to be very careful when they count the carbs in every meal and snack. This is so they " "can give themselves the right amount of insulin. If the insulin dose doesn't match the amount of carbs, their blood sugar might get too low or too high. Other people might be able to be a little more flexible as long as they get about the same amount of carbs at each meal or throughout the day.  Which foods have carbs? -- Foods with a lot of carbs include:   Grains - These include bread, pasta, rice, and cereal.   Fruits and starchy vegetables - Starchy vegetables include potatoes, corn, and squash.   Milk and other dairy products - Dairy products include cheese and yogurt.   Foods with added sugar - These include sweets and baked goods likes cookies and cakes, as well as sugary drinks like juice and soda.  It is best to get most of your carbs from fruits, vegetables, whole grains (like whole-wheat bread, whole-grain cereals, and brown rice), and low-fat milk and dairy products.  How do I count carbs? -- To count carbs in packaged foods, check the food's nutrition label (if it has one).  On the label (figure 1), check for:   \"Total Carbohydrate\" number - This tells you how many carbs are in 1 serving size of the food. If you eat 1 serving, then the number of carbs you eat is the same as the number of total carbohydrates.   \"Serving size\" - This tells you how much food is in 1 serving. If you have 2 servings, the number of carbs will be 2 times the number of carbohydrates listed.   \"Dietary Fiber\" - Fiber is a carb that is not digested, which means that it does not raise blood sugar. Foods with a lot of fiber can help manage your blood sugar. If a food has more than 5 grams (g) of fiber, you need less insulin to cover the total carbs in that food. So, if you are calculating an insulin dose, only count the carbs that are not from fiber (figure 1).  What is exchange planning? -- Exchange planning, or the \"exchange system,\" is a way for people to plan their meals without reading labels. This can be helpful since many " "foods don't come with a nutrition label.  The exchange system involves knowing how much of different foods have about 15 grams of carbs (table 1 and table 2 and table 3). Your doctor, nurse, or dietitian gives you a certain number of \"carb choices\" to eat with each meal and snack (table 4). Each \"choice\" is a portion of food that has about 15 grams of carbs. Knowing your options makes it easier to \"exchange\" 1 carb choice for another as you plan your meals and snacks. For example, 1 small apple could be exchanged for 1/3 cup of pasta.  How can I plan my meals? -- First, make sure that you know how many carbs you should be eating each day. Ask your doctor, nurse, or dietitian if you are not sure.  Here are some tips that might help:   Spread out your carbs over 4 to 6 small meals each day instead of 3 big ones.   Eat a similar number of carbs at each meal, for example, at each dinner.   Eat your meals at a similar time each day.   Plan your meals ahead of time.   Use the \"plate method.\" This is a simpler way to make sure that you get a good balance of carbs and other nutrients with each meal. It is not as exact as counting all of your carbs, but it can be helpful for people who prefer a simpler approach. If you take insulin before meals, it is generally better to adjust your insulin dose by counting how many carbs you plan to eat or using the exchange planning strategy.  For the plate method, you start with a plate about 9 inches (23 cm) across. Fill it with (figure 2):   1/2 non-starchy vegetables   1/4 protein   1/4 carbs   Follow your doctor's instructions for how and when to check your blood sugar. This can help you learn how certain foods affect your blood sugar.   Keep track of your meals and blood sugar levels. Show this to your doctor or nurse so they can adjust your treatment if needed. If you take insulin, you will also need to keep track of your exercise patterns and how much insulin you give yourself with " "each dose.   If you take insulin, make sure that you understand how to use it. This includes knowing how to adjust the dose based on your blood sugar level and what you plan to eat. Foods that have a lot of protein or fat also can affect your blood sugar level. Some people need to adjust their insulin doses when they eat these foods.   Remember that other things besides carbs can raise or lower your blood sugar level. These things can include exercise, getting sick, drinking alcohol, traveling, and stress. If you take insulin, make sure that you know how and when to adjust your dose in these situations.  If you are having trouble counting carbs or managing your blood sugar, talk to your doctor or nurse. They can help. A dietitian can also help you plan specific menus that will give you the right amount of carbs each day.  For more information, you can also get a book on counting carbs or check the American Diabetes Association website (www.diabetes.org).  All topics are updated as new evidence becomes available and our peer review process is complete.  This topic retrieved from [employer] on: Mar 27, 2024.  Topic 24688 Version 11.0  Release: 32.2.4 - C32.85  © 2024 UpToDate, Inc. and/or its affiliates. All rights reserved.  figure 1: Counting carbohydrates     To figure out the \"carb count\" in 1 serving, start with the number of grams of total carbohydrates (46 grams), then subtract the number of grams of dietary fiber (7 grams). It's also important to look at the serving size. In this example, the carb count is 39 grams. You can use this number when counting carbs for your insulin dose.  Graphic 97052 Version 8.0  table 1: Bread and grains with 15 grams of carbs*  Bread    Food  Serving size    Bagel 1/4 large bagel (1 oz)   Biscuit 1 biscuit (2.5 inches across)   Bread, reduced calorie, light 2 slices (1.5 oz)   Cornbread 1.75 inch cube (1.5 oz)   English muffin 1/2 muffin   Hot dog or hamburger bun 1/2 bun (3/4 oz) " "  Naan, chapati, or roti 1 oz   Pancake 1 pancake (4 inches across, 1/4 inch thick)   Mary (6 inches across) 1/2 mary   Tortilla, corn 1 small tortilla (6 inches across)   Tortilla, flour (white or whole wheat) 1 small tortilla (6 inches across) or 1/3 large tortilla (10 inches across)   Waffle 1 waffle (4-inch square or 4 inches across)   Cereals and grains (including pasta and rice)    Food  Serving size (cooked)    Barley, couscous, millet, pasta (white or whole wheat, all shapes and sizes), polenta, quinoa (all colors), or rice (white, brown, and other colors and types) 1/3 cup   Bran cereal (twigs, buds, or flakes), shredded wheat (plain), or sugar-coated cereal 1/2 cup   Bulgur, kasha, tabbouleh (tabouli), or wild rice 1/2 cup   Granola cereal 1/4 cup   Hot cereal (oats, oatmeal, grits) 1/2 cup   Unsweetened, ready-to-eat cereal 3/4 cup   * For bread and grains, 15 grams of carbs is considered 1 serving or \"choice\" for people who need to count carbs.  Graphic 148026 Version 1.0  table 2: Fruits with 15 grams of carbs*  Food  Serving size    Applesauce, unsweetened 1/2 cup   Banana 1 extra small banana, about 4 inches long (4 oz)   Blueberries 3/4 cup   Dried fruits (blueberries, cherries, cranberries, mixed fruit, raisins) 2 tbsp   Fruit, canned 1/2 cup   Fruit, whole, small (apple) 1 small fruit (4 oz)   Fruit, whole, medium (nectarine, orange, pear, tangerine) 1 medium fruit (6 oz)   Fruit juice, unsweetened 1/2 cup   Grapes 17 small grapes (3 oz)   Melon, diced 1 cup   Strawberries, whole 1 and 1/4 cups   When listed, weight (oz) includes skin and seeds. If you are not sure if your fruit is the right size for 1 serving, you can use a food scale to check the weight.  * For fruits, 15 grams of carbs is considered 1 serving or \"choice\" for people who need to count carbs.  Graphic 201379 Version 1.0  table 3: Starchy vegetables with 15 grams of carbs*  Food  Serving size (cooked)    Cassava, dasheen, or " "plantain 1/3 cup   Corn, green peas, mixed vegetables, or parsnips 1/2 cup   Marinara, pasta, or spaghetti sauce 1/2 cup   Mixed vegetables (with corn or peas) 1 cup   Potato, baked with skin 1/4 large (3 oz)   Potato, Indonesian-fried (oven-baked) 1 cup (2 oz)   Potato, mashed with milk and fat 1/2 cup   Squash, winter (acorn, butternut) 1 cup   Yam or sweet potato, plain 1/2 cup (3 and 1/2 oz)   If you are not sure if your vegetable is the right size for 1 serving, you can use a food scale to check the weight.  * For starchy vegetables, 15 grams of carbs is considered 1 serving or \"choice\" for people who need to count carbs.  Graphic 149153 Version 1.0  table 4: Sample exchange system meal plan  Time  Exchange pattern  Sample menu  Carbohydrate count (g)    8 am 3 carbohydrate group    2 starch 1 English muffin 30    1 fruit 1 1/4 c strawberries 15    1 protein group 1/4 c cottage cheese -    1 fat group 1 tsp margarine -      Total: 45    12 noon 4 carbohydrate group    2 starch 2 slices of bread 30    1 fruit 1 orange 15    1 vegetable 1 c salad -    1 milk 8 oz skim milk 12    3 protein group 3 oz chicken -    1 fat group 1 tbsp low fat owusu -      Total: 57    3 pm 1 carbohydrate group    1 fruit or 1 starch 1 apple or 6 crackers 15      Total: 15    6 pm 4 carbohydrate group    2 starch 1 c potato 30    1 fruit 1/2 c fruit salad 15    1 vegetable 1 c salad -    1 milk 8 oz skim milk 12    6 protein group 6 oz fish -    1 fat group 2 tbsp low fat salad dressing -      Total: 57    9 pm 1 carbohydrate group    1 starch 6 crackers 15    1 protein 2 tbsp peanut butter -      Total: 15    Graphic 07914 Version 3.0  figure 2: The \"plate method\"     For the plate method, you start with a plate about 9 inches (23 cm) across. Then fill it with 1/2 non-starchy vegetables, 1/4 protein, and 1/4 carbs.  Graphic 704106 Version 2.0  Consumer Information Use and Disclaimer   Disclaimer: This generalized information is a limited " summary of diagnosis, treatment, and/or medication information. It is not meant to be comprehensive and should be used as a tool to help the user understand and/or assess potential diagnostic and treatment options. It does NOT include all information about conditions, treatments, medications, side effects, or risks that may apply to a specific patient. It is not intended to be medical advice or a substitute for the medical advice, diagnosis, or treatment of a health care provider based on the health care provider's examination and assessment of a patient's specific and unique circumstances. Patients must speak with a health care provider for complete information about their health, medical questions, and treatment options, including any risks or benefits regarding use of medications. This information does not endorse any treatments or medications as safe, effective, or approved for treating a specific patient. UpToDate, Inc. and its affiliates disclaim any warranty or liability relating to this information or the use thereof.The use of this information is governed by the Terms of Use, available at https://www.Poppinuwer.com/en/know/clinical-effectiveness-terms. 2024© UpToDate, Inc. and its affiliates and/or licensors. All rights reserved.  Copyright   © 2024 UpToDate, Inc. and/or its affiliates. All rights reserved.  Patient Education     Can foods or supplements lower cholesterol?   The Basics   Written by the doctors and editors at [employer]   Can I lower my cholesterol by changing my diet? -- Maybe. Some people can lower their cholesterol by changing their diet. But this does not always work. Still, you can improve your overall health by eating better.  If you have high cholesterol, it might help to avoid or limit saturated fats. These are found in foods like:   Red meat   Butter   Fried foods   Cheese   Baked goods, such as cookies, cakes, or brownies  Other things that might help lower cholesterol include:    "Eating more soluble fiber - Soluble fiber is found in fruits, oats, barley, beans, and peas.   A vegetarian or vegan diet - A vegetarian diet contains no meat. A vegan diet contains no animal products at all, including meat, eggs, or milk.   Replacing meat with soy sometimes - Soy-based products include tofu and tempeh.  In general, you can improve your health by eating lots of fruits, vegetables, and whole grains. You can also cut back on carbohydrates, sweets, and processed foods.  Eating a \"Mediterranean diet\" might help lower your cholesterol. This type of diet:   Includes a lot of fruits, vegetables, nuts, and whole grains   Uses olive oil instead of other fats   Includes some fish, poultry, and dairy products, but not a lot of red meat  What about eggs? -- Eggs are OK if you want to eat them, but don't overdo it. The news often has stories about the health benefits or risks of eggs. The truth is, eggs are a good source of protein and do not raise cholesterol much. Saturated fats raise cholesterol levels more than eggs do.  Are there specific foods that can lower my cholesterol? -- Maybe. There are some foods that seem to help lower cholesterol, including:   Foods rich in omega-3 fatty acids - Studies show that people who eat lots of these foods are less likely to have heart disease than those who eat less of them. Examples include oily fish (such as salmon, herring, or tuna), olive oil, and canola oil. It's fine to eat 1 to 2 servings of oily fish a week.   Nuts - Some studies show that eating certain nuts can help lower cholesterol. They might even lower the risk of heart attack or death. These nuts include walnuts, almonds, and pistachios.   Fiber-rich foods - These foods seem to lower cholesterol and are generally good for your health. Examples include fruits, vegetables, beans, and oats. Some doctors even recommend taking fiber supplements.  What about  foods that claim to lower cholesterol? -- Be " "careful with these foods. There are now many foods that have added plant extracts called \"sterols\" or \"stanols.\" Examples include special margarines such as Benecol. Foods with added sterols or stanols can lower cholesterol. But it's not clear whether they help lower the risk of heart attack or stroke, or if they are safe to use long-term. Plus, research in animals shows that these extracts might actually cause health problems. Experts think more research is needed before they can recommend that people eat these foods.  Should I take supplements to lower my cholesterol? -- Maybe. Some research has shown that certain supplements can lower cholesterol. But there is almost no research showing that supplements can help prevent heart attacks, strokes, or any of the problems caused by high cholesterol. If you decide to try supplements, keep in mind that in the [place], the government does not regulate supplements very well. That means that what's on a supplement's label is not always actually in the bottle.  Here are some supplements that might help with cholesterol:   Red yeast rice - Red yeast rice helps lower cholesterol. It might contain monacolin K, which is the same ingredient that is in a prescription medicine to lower cholesterol. But the supplements that you can buy might not always have much monacolin K. If you are interested in taking red yeast rice, talk to your doctor to see if the prescription medicine is a better choice.   Omega-3 fatty acid supplements - Some omega-3 fatty acid supplements might help lower cholesterol, but they might also raise it.  What supplements don't work? -- There is no good evidence that calcium, garlic, coconut oil, coconut water, resveratrol, policosanol, or soy isoflavone supplements lower cholesterol.  All topics are updated as new evidence becomes available and our peer review process is complete.  This topic retrieved from [employer] on: Feb 26, 2024.  Topic 11722 Version " 20.0  Release: 32.2.4 - C32.56  © 2024 UpToDate, Inc. and/or its affiliates. All rights reserved.  Consumer Information Use and Disclaimer   Disclaimer: This generalized information is a limited summary of diagnosis, treatment, and/or medication information. It is not meant to be comprehensive and should be used as a tool to help the user understand and/or assess potential diagnostic and treatment options. It does NOT include all information about conditions, treatments, medications, side effects, or risks that may apply to a specific patient. It is not intended to be medical advice or a substitute for the medical advice, diagnosis, or treatment of a health care provider based on the health care provider's examination and assessment of a patient's specific and unique circumstances. Patients must speak with a health care provider for complete information about their health, medical questions, and treatment options, including any risks or benefits regarding use of medications. This information does not endorse any treatments or medications as safe, effective, or approved for treating a specific patient. UpToDate, Inc. and its affiliates disclaim any warranty or liability relating to this information or the use thereof.The use of this information is governed by the Terms of Use, available at https://www.woltersMixamouwer.com/en/know/clinical-effectiveness-terms. 2024© UpToDate, Inc. and its affiliates and/or licensors. All rights reserved.  Copyright   © 2024 UpToDate, Inc. and/or its affiliates. All rights reserved.

## 2025-03-10 NOTE — ASSESSMENT & PLAN NOTE
Wt Readings from Last 3 Encounters:   03/10/25 (!) 151 kg (333 lb 3.2 oz)   01/31/25 (!) 143 kg (315 lb)   01/09/25 (!) 143 kg (315 lb 0.6 oz)       Patient continues to follow with cardiology however is overdue for a visit.  He does report some weight gain over the past 2 weeks.  He has an increase in his lower extremity swelling.  No shortness of breath.  Continues on Jardiance.  Patient is also on Bumex, Zaroxolyn and metoprolol.

## 2025-03-10 NOTE — ASSESSMENT & PLAN NOTE
BMI in the office today is 43.  He does have a past history of gastric bypass surgery.  He states he is walking daily.  Recommend continue low-fat low-carb diet.

## 2025-03-10 NOTE — ASSESSMENT & PLAN NOTE
Surveillance blood work ordered.  Orders:    CBC and differential; Future    Iron Panel (Includes Ferritin, Iron Sat%, Iron, and TIBC); Future

## 2025-03-10 NOTE — PROGRESS NOTES
Adult Annual Physical  Name: Mesfin Cano      : 1965      MRN: [id number]  Encounter Provider: RODRIGUE Hodge  Encounter Date: 3/10/2025   Encounter department: [facility]    Assessment & Plan  Prostate cancer screening    Orders:    PSA, Total Screen; Future    Paroxysmal atrial fibrillation (HCC)  Patient is overdue for follow-up with cardiology.  He remains on his Eliquis.    Chronic heart failure with preserved ejection fraction (HFpEF, >= 50%) (HCC)  Wt Readings from Last 3 Encounters:   03/10/25 (!) 151 kg (333 lb 3.2 oz)   25 (!) 143 kg (315 lb)   25 (!) 143 kg (315 lb 0.6 oz)       Patient continues to follow with cardiology however is overdue for a visit.  He does report some weight gain over the past 2 weeks.  He has an increase in his lower extremity swelling.  No shortness of breath.  Continues on Jardiance.  Patient is also on Bumex, Zaroxolyn and metoprolol.       Primary hypertension  Blood pressure in the office today is 126/70.  He is taking metoprolol prescribed by cardiology.       VICKY (obstructive sleep apnea)  Patient does have CPAP at home.  He is due to follow-up with sleep medicine.       Primary osteoarthritis of both knees  Patient is morbidly obese.  Denies any falls.       Stage 3b chronic kidney disease (HCC)  Lab Results   Component Value Date    EGFR 39 2025    EGFR 46 2025    EGFR 34 2025    CREATININE 1.85 (H) 2025    CREATININE 1.61 (H) 2025    CREATININE 2.07 (H) 2025     Patient follows with nephrology however is due for a follow-up appointment.  I did remind him to schedule this appointment.  Orders:    Comprehensive metabolic panel; Future    Hyperlipidemia  Patient due for blood work.  Continues on Lipitor.  Weight loss and low-fat diet recommended.  Orders:    Lipid Panel with Direct LDL reflex; Future    Morbid obesity (HCC)  BMI in the office today is 43.  He does have a past history of  gastric bypass surgery.  He states he is walking daily.  Recommend continue low-fat low-carb diet.         Iron deficiency  Surveillance blood work ordered.  Orders:    CBC and differential; Future    Iron Panel (Includes Ferritin, Iron Sat%, Iron, and TIBC); Future    Moderate persistent asthma with acute exacerbation  Asthma well-controlled.  No exacerbations.  Orders:    budesonide-formoterol (Symbicort) 160-4.5 mcg/act inhaler; Inhale 2 puffs 2 (two) times a day Rinse mouth after use.    Type 2 diabetes mellitus without complication, without long-term current use of insulin ([facility])  Lab Results   Component Value Date    HGBA1C 6.3 03/10/2025     Hemoglobin A1c in the office today is 6.3.  He remains on his Januvia.  Diabetic foot exam performed today.  He is making an upcoming appointment with podiatry.  He is overdue for an eye exam and will schedule that.  Valence blood work ordered.         Immunizations and preventive care screenings were discussed with patient today. Appropriate education was printed on patient's after visit summary.    Discussed risks and benefits of prostate cancer screening. We discussed the controversial history of PSA screening for prostate cancer in the [place] as well as the risk of over detection and over treatment of prostate cancer by way of PSA screening.  The patient understands that PSA blood testing is an imperfect way to screen for prostate cancer and that elevated PSA levels in the blood may also be caused by infection, inflammation, prostatic trauma or manipulation, urological procedures, or by benign prostatic enlargement.    The role of the digital rectal examination in prostate cancer screening was also discussed and I discussed with him that there is large interobserver variability in the findings of digital rectal examination.    Counseling:  Alcohol/drug use: discussed moderation in alcohol intake, the recommendations for healthy alcohol use, and avoidance of  illicit drug use.  Dental Health: discussed importance of regular tooth brushing, flossing, and dental visits.  Injury prevention: discussed safety/seat belts, safety helmets, smoke detectors, carbon monoxide detectors, and smoking near bedding or upholstery.  Exercise: the importance of regular exercise/physical activity was discussed. Recommend exercise 3-5 times per week for at least 30 minutes.       Depression Screening and Follow-up Plan: Patient was screened for depression during today's encounter. They screened negative with a PHQ-2 score of 0.          History of Present Illness     Adult Annual Physical:  Patient presents for annual physical. Patient presents to the office today for annual physical.  Overall the patient is stable.  He does report a weight gain over the past week as he does continue to do daily weights.  He is due for follow-up with cardiology and I did recommend he contact them.  He denies any shortness of breath or chest pain.  He does have an increase in lower extremity swelling.  He is due for colonoscopy and he knows he needs to call and schedule that.  He is overdue for diabetic eye exam and he will schedule that as well.  He is due for blood work and will have it done in the next week or 2.  He does not get COVID or flu vaccines.          .     Diet and Physical Activity:  - Diet/Nutrition: well balanced diet and heart healthy (low sodium) diet.  - Exercise: walking and 3-4 times a week on average.    Depression Screening:  - PHQ-2 Score: 0    General Health:  - Sleep: sleeps well and 4-6 hours of sleep on average.  - Hearing: normal hearing right ear and normal hearing left ear.  - Vision: wears glasses and goes for regular eye exams. [facility] by [place], has been about a year  - Dental: regular dental visits and brushes teeth twice daily.     Health:  - History of STDs: no.   - Urinary symptoms: none.     Advanced Care Planning:  - Has an advanced directive?: no    - Has  a durable medical POA?: no      Review of Systems   Constitutional: Negative.  Negative for fatigue.   HENT: Negative.  Negative for congestion, postnasal drip, rhinorrhea and trouble swallowing.    Eyes: Negative.  Negative for visual disturbance.   Respiratory: Negative.  Negative for choking and shortness of breath.    Cardiovascular: Negative.  Negative for chest pain.   Gastrointestinal: Negative.    Endocrine: Negative.    Genitourinary: Negative.    Musculoskeletal: Negative.  Negative for arthralgias, back pain, myalgias and neck pain.   Skin: Negative.    Neurological:  Negative for dizziness and headaches.   Psychiatric/Behavioral: Negative.       Current Outpatient Medications on File Prior to Visit   Medication Sig Dispense Refill    Accu-Chek FastClix Lancets MISC Test blood sugar twice daily 200 each 3    acetaminophen (TYLENOL) 325 mg tablet Take 2 tablets (650 mg total) by mouth every 6 (six) hours as needed for mild pain, headaches or fever      acetaminophen (TYLENOL) 650 mg CR tablet Take 1 tablet (650 mg total) by mouth every 8 (eight) hours as needed for mild pain 30 tablet 0    albuterol (PROVENTIL HFA,VENTOLIN HFA) 90 mcg/act inhaler Inhale 2 puffs every 4 (four) hours as needed for wheezing 18 g 0    apixaban (Eliquis) 5 mg Take 1 tablet (5 mg total) by mouth 2 (two) times a day 180 tablet 0    atorvastatin (LIPITOR) 10 mg tablet TAKE 1 TABLET BY MOUTH EVERY DAY 30 tablet 5    Blood Glucose Monitoring Suppl (Accu-Chek Guide) w/Device KIT Use 2 (two) times a day Test blood sugar twice daily 1 kit 0    bumetanide (BUMEX) 2 mg tablet TAKE 3 TABLETS (6 MG TOTAL) BY MOUTH 3 (THREE) TIMES A  tablet 1    cyanocobalamin (VITAMIN B-12) 1000 MCG tablet Take 1 tablet (1,000 mcg total) by mouth daily 30 tablet 0    Empagliflozin (JARDIANCE) 10 MG TABS tablet Take 1 tablet (10 mg total) by mouth daily 30 tablet 11    famotidine (PEPCID) 20 mg tablet Take 1 tablet (20 mg total) by mouth if needed  for heartburn As needed twice a day      fluticasone (FLONASE) 50 mcg/act nasal spray 1 spray into each nostril 2 (two) times a day  0    loratadine (CLARITIN) 10 mg tablet Take 1 tablet (10 mg total) by mouth if needed for allergies As needed daily      metolazone (ZAROXOLYN) 2.5 mg tablet Take 2 tablets (5 mg total) by mouth 3 (three) times a week Take 20-30 minutes before Bumex dose and with additional potassium 180 tablet 0    metoprolol succinate (TOPROL-XL) 50 mg 24 hr tablet TAKE 1 TABLET BY MOUTH EVERY DAY 90 tablet 1    nitroglycerin (NITROSTAT) 0.4 mg SL tablet Place 1 tablet (0.4 mg total) under the tongue every 5 (five) minutes as needed for chest pain for up to 50 doses 50 tablet 0    potassium chloride (Klor-Con M20) 20 mEq tablet Take 40 mEq in the morning, 20 mEq in the afternoon, and 40 mEq in the evening 180 tablet 0    pregabalin (LYRICA) 50 mg capsule Take 1 caps. at bedtime 90 capsule 1    sitaGLIPtin (Januvia) 100 mg tablet TAKE 1/2 TABLET BY MOUTH EVERY DAY 15 tablet 5    sodium chloride (OCEAN) 0.65 % nasal spray 1 spray into each nostril every hour as needed for congestion 37.5 mL 0    spironolactone (ALDACTONE) 25 mg tablet Take 1 tablet (25 mg total) by mouth daily 30 tablet 0    [DISCONTINUED] budesonide-formoterol (Symbicort) 160-4.5 mcg/act inhaler Inhale 2 puffs 2 (two) times a day Rinse mouth after use. 30.6 g 2    montelukast (SINGULAIR) 10 mg tablet Take 1 tablet (10 mg total) by mouth daily at bedtime 90 tablet 1    tiotropium (Spiriva Respimat) 1.25 MCG/ACT AERS inhaler Inhale 2 puffs daily 4 g 5     No current facility-administered medications on file prior to visit.      Social History     Tobacco Use    Smoking status: Former     Current packs/day: 1.00     Average packs/day: 1 pack/day for 5.0 years (5.0 ttl pk-yrs)     Types: Pipe, Cigars, Cigarettes     Start date: 2016     Quit date: 1/1/2021     Passive exposure: Never    Smokeless tobacco: Former    Tobacco comments:      "cigar once a day   Vaping Use    Vaping status: Never Used   Substance and Sexual Activity    Alcohol use: Yes     Alcohol/week: 2.0 standard drinks of alcohol     Types: 2 Shots of liquor per week     Comment: social    Drug use: No    Sexual activity: Yes     Partners: Female     Birth control/protection: None       Objective   /70   Pulse 104   Temp 98 °F (36.7 °C) (Temporal)   Resp 18   Ht 6' 1\" (1.854 m)   Wt (!) 151 kg (333 lb 3.2 oz)   SpO2 96%   BMI 43.96 kg/m²     Physical Exam  Vitals and nursing note reviewed.   Constitutional:       Appearance: Normal appearance. He is well-developed. He is obese.   HENT:      Head: Normocephalic and atraumatic.      Right Ear: Tympanic membrane, ear canal and external ear normal. There is no impacted cerumen.      Left Ear: Tympanic membrane, ear canal and external ear normal. There is no impacted cerumen.      Nose: Nose normal.      Mouth/Throat:      Mouth: Mucous membranes are moist.      Pharynx: Oropharynx is clear.   Eyes:      Conjunctiva/sclera: Conjunctivae normal.   Cardiovascular:      Rate and Rhythm: Normal rate and regular rhythm.      Pulses: no weak pulses.           Dorsalis pedis pulses are 1+ on the right side and 1+ on the left side.        Posterior tibial pulses are 1+ on the right side and 1+ on the left side.      Heart sounds: Normal heart sounds. No murmur heard.  Pulmonary:      Effort: Pulmonary effort is normal. No respiratory distress.      Breath sounds: Normal breath sounds.   Abdominal:      General: Bowel sounds are normal. There is no distension.      Palpations: Abdomen is soft. There is no mass.      Tenderness: There is no abdominal tenderness. There is no guarding or rebound.      Hernia: A hernia (ventral) is present.   Musculoskeletal:         General: Normal range of motion.      Cervical back: Normal range of motion and neck supple.      Right lower le+ Edema present.      Left lower le+ Edema present. "   Feet:      Right foot:      Skin integrity: Dry skin present. No ulcer, skin breakdown, erythema, warmth or callus.      Toenail Condition: Right toenails are abnormally thick and long. Fungal disease present.     Left foot:      Skin integrity: Dry skin present. No ulcer, skin breakdown, erythema, warmth or callus.      Toenail Condition: Left toenails are abnormally thick and long. Fungal disease present.  Skin:     General: Skin is warm and dry.   Neurological:      General: No focal deficit present.      Mental Status: He is alert and oriented to person, place, and time. Mental status is at baseline.   Psychiatric:         Mood and Affect: Mood normal.         Behavior: Behavior normal.         Thought Content: Thought content normal.         Judgment: Judgment normal.           Diabetic Foot Exam    Patient's shoes and socks removed.    Right Foot/Ankle   Right Foot Inspection  Skin Exam: skin normal, skin intact and dry skin. No warmth, no callus, no erythema, no maceration, no abnormal color, no pre-ulcer, no ulcer and no callus.     Toe Exam: ROM and strength within normal limits and swelling.     Sensory   Vibration: intact  Proprioception: intact  Monofilament testing: intact    Vascular  Capillary refills: < 3 seconds  The right DP pulse is 1+. The right PT pulse is 1+.     Right Toe  - Comprehensive Exam  Ecchymosis: none  Arch: normal  Hammertoes: absent  Claw Toes: absent  Swelling: none   Tenderness: none       Left Foot/Ankle  Left Foot Inspection  Skin Exam: skin normal, skin intact and dry skin. No warmth, no erythema, no maceration, normal color, no pre-ulcer, no ulcer and no callus.     Toe Exam: ROM and strength within normal limits and swelling.     Sensory   Vibration: intact  Proprioception: intact  Monofilament testing: intact    Vascular  Capillary refills: < 3 seconds  The left DP pulse is 1+. The left PT pulse is 1+.     Left Toe  - Comprehensive Exam  Ecchymosis: none  Arch:  normal  Hammertoes: absent  Claw toes: absent  Swelling: none   Tenderness: none       Assign Risk Category  No deformity present  No loss of protective sensation  No weak pulses  Risk: 0

## 2025-03-10 NOTE — ASSESSMENT & PLAN NOTE
Patient due for blood work.  Continues on Lipitor.  Weight loss and low-fat diet recommended.  Orders:    Lipid Panel with Direct LDL reflex; Future

## 2025-03-10 NOTE — ASSESSMENT & PLAN NOTE
Blood pressure in the office today is 126/70.  He is taking metoprolol prescribed by cardiology.

## 2025-03-11 ENCOUNTER — TELEPHONE (OUTPATIENT)
Dept: CARDIOLOGY CLINIC | Facility: CLINIC | Age: 60
End: 2025-03-11

## 2025-03-11 ENCOUNTER — APPOINTMENT (EMERGENCY)
Dept: RADIOLOGY | Facility: HOSPITAL | Age: 60
DRG: 291 | End: 2025-03-11
Payer: COMMERCIAL

## 2025-03-11 ENCOUNTER — HOSPITAL ENCOUNTER (INPATIENT)
Facility: HOSPITAL | Age: 60
LOS: 9 days | Discharge: HOME | DRG: 291 | End: 2025-03-20
Attending: EMERGENCY MEDICINE | Admitting: INTERNAL MEDICINE
Payer: COMMERCIAL

## 2025-03-11 DIAGNOSIS — T50.2X5A DIURETIC-INDUCED HYPOKALEMIA: ICD-10-CM

## 2025-03-11 DIAGNOSIS — R06.00 DYSPNEA: Primary | ICD-10-CM

## 2025-03-11 DIAGNOSIS — I50.9 CHF EXACERBATION (HCC): ICD-10-CM

## 2025-03-11 DIAGNOSIS — M79.89 LEG SWELLING: ICD-10-CM

## 2025-03-11 DIAGNOSIS — I50.9 ACUTE ON CHRONIC CONGESTIVE HEART FAILURE, UNSPECIFIED HEART FAILURE TYPE (HCC): ICD-10-CM

## 2025-03-11 DIAGNOSIS — R79.89 ELEVATED BRAIN NATRIURETIC PEPTIDE (BNP) LEVEL: ICD-10-CM

## 2025-03-11 DIAGNOSIS — E87.6 DIURETIC-INDUCED HYPOKALEMIA: ICD-10-CM

## 2025-03-11 LAB
2HR DELTA HS TROPONIN: -2 NG/L
ALBUMIN SERPL BCG-MCNC: 3.2 G/DL (ref 3.5–5)
ALP SERPL-CCNC: 83 U/L (ref 34–104)
ALT SERPL W P-5'-P-CCNC: 6 U/L (ref 7–52)
ANION GAP SERPL CALCULATED.3IONS-SCNC: 12 MMOL/L (ref 4–13)
AST SERPL W P-5'-P-CCNC: 10 U/L (ref 13–39)
BASOPHILS # BLD AUTO: 0.06 THOUSANDS/ÂΜL (ref 0–0.1)
BASOPHILS NFR BLD AUTO: 1 % (ref 0–1)
BILIRUB SERPL-MCNC: 0.3 MG/DL (ref 0.2–1)
BNP SERPL-MCNC: 319 PG/ML (ref 0–100)
BUN SERPL-MCNC: 18 MG/DL (ref 5–25)
CALCIUM ALBUM COR SERPL-MCNC: 7.8 MG/DL (ref 8.3–10.1)
CALCIUM SERPL-MCNC: 7.2 MG/DL (ref 8.4–10.2)
CARDIAC TROPONIN I PNL SERPL HS: 12 NG/L (ref ?–50)
CARDIAC TROPONIN I PNL SERPL HS: 14 NG/L (ref ?–50)
CHLORIDE SERPL-SCNC: 102 MMOL/L (ref 96–108)
CO2 SERPL-SCNC: 22 MMOL/L (ref 21–32)
CREAT SERPL-MCNC: 1.14 MG/DL (ref 0.6–1.3)
EOSINOPHIL # BLD AUTO: 0.32 THOUSAND/ÂΜL (ref 0–0.61)
EOSINOPHIL NFR BLD AUTO: 6 % (ref 0–6)
ERYTHROCYTE [DISTWIDTH] IN BLOOD BY AUTOMATED COUNT: 15.6 % (ref 11.6–15.1)
GFR SERPL CREATININE-BSD FRML MDRD: 70 ML/MIN/1.73SQ M
GLUCOSE SERPL-MCNC: 170 MG/DL (ref 65–140)
HCT VFR BLD AUTO: 33.5 % (ref 36.5–49.3)
HGB BLD-MCNC: 11 G/DL (ref 12–17)
IMM GRANULOCYTES # BLD AUTO: 0.06 THOUSAND/UL (ref 0–0.2)
IMM GRANULOCYTES NFR BLD AUTO: 1 % (ref 0–2)
LYMPHOCYTES # BLD AUTO: 1.18 THOUSANDS/ÂΜL (ref 0.6–4.47)
LYMPHOCYTES NFR BLD AUTO: 22 % (ref 14–44)
MCH RBC QN AUTO: 30.7 PG (ref 26.8–34.3)
MCHC RBC AUTO-ENTMCNC: 32.8 G/DL (ref 31.4–37.4)
MCV RBC AUTO: 94 FL (ref 82–98)
MONOCYTES # BLD AUTO: 0.52 THOUSAND/ÂΜL (ref 0.17–1.22)
MONOCYTES NFR BLD AUTO: 10 % (ref 4–12)
NEUTROPHILS # BLD AUTO: 3.12 THOUSANDS/ÂΜL (ref 1.85–7.62)
NEUTS SEG NFR BLD AUTO: 60 % (ref 43–75)
NRBC BLD AUTO-RTO: 0 /100 WBCS
PLATELET # BLD AUTO: 208 THOUSANDS/UL (ref 149–390)
PMV BLD AUTO: 8.9 FL (ref 8.9–12.7)
POTASSIUM SERPL-SCNC: 4.1 MMOL/L (ref 3.5–5.3)
PROT SERPL-MCNC: 5.8 G/DL (ref 6.4–8.4)
RBC # BLD AUTO: 3.58 MILLION/UL (ref 3.88–5.62)
SODIUM SERPL-SCNC: 136 MMOL/L (ref 135–147)
WBC # BLD AUTO: 5.26 THOUSAND/UL (ref 4.31–10.16)

## 2025-03-11 PROCEDURE — 99285 EMERGENCY DEPT VISIT HI MDM: CPT

## 2025-03-11 PROCEDURE — 99285 EMERGENCY DEPT VISIT HI MDM: CPT | Performed by: EMERGENCY MEDICINE

## 2025-03-11 PROCEDURE — 85025 COMPLETE CBC W/AUTO DIFF WBC: CPT | Performed by: EMERGENCY MEDICINE

## 2025-03-11 PROCEDURE — 96374 THER/PROPH/DIAG INJ IV PUSH: CPT

## 2025-03-11 PROCEDURE — 71046 X-RAY EXAM CHEST 2 VIEWS: CPT

## 2025-03-11 PROCEDURE — 93005 ELECTROCARDIOGRAM TRACING: CPT

## 2025-03-11 PROCEDURE — 83880 ASSAY OF NATRIURETIC PEPTIDE: CPT | Performed by: EMERGENCY MEDICINE

## 2025-03-11 PROCEDURE — 36415 COLL VENOUS BLD VENIPUNCTURE: CPT | Performed by: EMERGENCY MEDICINE

## 2025-03-11 PROCEDURE — 84484 ASSAY OF TROPONIN QUANT: CPT | Performed by: EMERGENCY MEDICINE

## 2025-03-11 PROCEDURE — 80053 COMPREHEN METABOLIC PANEL: CPT | Performed by: EMERGENCY MEDICINE

## 2025-03-11 RX ORDER — BUDESONIDE AND FORMOTEROL FUMARATE DIHYDRATE 160; 4.5 UG/1; UG/1
2 AEROSOL RESPIRATORY (INHALATION) 2 TIMES DAILY
Status: DISCONTINUED | OUTPATIENT
Start: 2025-03-12 | End: 2025-03-20 | Stop reason: HOSPADM

## 2025-03-11 RX ORDER — MONTELUKAST SODIUM 10 MG/1
10 TABLET ORAL
Status: DISCONTINUED | OUTPATIENT
Start: 2025-03-12 | End: 2025-03-20 | Stop reason: HOSPADM

## 2025-03-11 RX ORDER — PREGABALIN 50 MG/1
50 CAPSULE ORAL
Status: DISCONTINUED | OUTPATIENT
Start: 2025-03-12 | End: 2025-03-20 | Stop reason: HOSPADM

## 2025-03-11 RX ORDER — ATORVASTATIN CALCIUM 10 MG/1
10 TABLET, FILM COATED ORAL DAILY
Status: DISCONTINUED | OUTPATIENT
Start: 2025-03-12 | End: 2025-03-20 | Stop reason: HOSPADM

## 2025-03-11 RX ORDER — INSULIN LISPRO 100 [IU]/ML
1-5 INJECTION, SOLUTION INTRAVENOUS; SUBCUTANEOUS
Status: DISCONTINUED | OUTPATIENT
Start: 2025-03-12 | End: 2025-03-16

## 2025-03-11 RX ORDER — BUMETANIDE 0.25 MG/ML
2 INJECTION, SOLUTION INTRAMUSCULAR; INTRAVENOUS ONCE
Status: COMPLETED | OUTPATIENT
Start: 2025-03-11 | End: 2025-03-11

## 2025-03-11 RX ORDER — FAMOTIDINE 20 MG/1
20 TABLET, FILM COATED ORAL 2 TIMES DAILY PRN
Status: DISCONTINUED | OUTPATIENT
Start: 2025-03-11 | End: 2025-03-20 | Stop reason: HOSPADM

## 2025-03-11 RX ORDER — LORATADINE 10 MG/1
10 TABLET ORAL DAILY
Status: DISCONTINUED | OUTPATIENT
Start: 2025-03-12 | End: 2025-03-20 | Stop reason: HOSPADM

## 2025-03-11 RX ORDER — BUMETANIDE 0.25 MG/ML
2 INJECTION, SOLUTION INTRAMUSCULAR; INTRAVENOUS ONCE
Status: DISCONTINUED | OUTPATIENT
Start: 2025-03-11 | End: 2025-03-11

## 2025-03-11 RX ORDER — METOPROLOL SUCCINATE 50 MG/1
50 TABLET, EXTENDED RELEASE ORAL DAILY
Status: DISCONTINUED | OUTPATIENT
Start: 2025-03-12 | End: 2025-03-20 | Stop reason: HOSPADM

## 2025-03-11 RX ORDER — FLUTICASONE PROPIONATE 50 MCG
1 SPRAY, SUSPENSION (ML) NASAL 2 TIMES DAILY
Status: DISCONTINUED | OUTPATIENT
Start: 2025-03-12 | End: 2025-03-20 | Stop reason: HOSPADM

## 2025-03-11 RX ORDER — ACETAMINOPHEN 325 MG/1
650 TABLET ORAL EVERY 6 HOURS PRN
Status: DISCONTINUED | OUTPATIENT
Start: 2025-03-11 | End: 2025-03-20 | Stop reason: HOSPADM

## 2025-03-11 RX ADMIN — BUMETANIDE 2 MG: 0.25 INJECTION INTRAMUSCULAR; INTRAVENOUS at 22:00

## 2025-03-11 NOTE — TELEPHONE ENCOUNTER
Returned call to patient. Patient identified himself on message. Left a detailed message of Dr Wilcox orders & instructions.    Requested patient return call & left c/b number.

## 2025-03-11 NOTE — TELEPHONE ENCOUNTER
Patient called stating he is not doing so good Stated his weight is up & he is experiencing b/lLE edema, his hands are swollen, plus a small amount of abdominal bloating. Since yesterday, has had a hard time putting on his shoes.    Today's Wt - 333 lb  Stated his dry Wt - 315 lb    Stated he notices since yesterday, he is more SOB than his baseline, when he is out walking & when he is climbing steps.  Breathing okay when sitting.    Stated he took Metolazone 5 mg yesterday, as ordered but also took it today due to weight gain & symptoms.  Stated he will take it tomorrow also.    Takes Potassium 20 mEq extra the days he takes Metolazone. Otherwise he is taking Potassium 40 mEq in the AM & 20 mEq in the PM.    Patient stated he is not eating any differently. Eating 2 G sodium diet, about 60-65 oz of fluid /day.    CardioMems Readings:  3/1 - None  3/2 - 16  3/3 - 12  3/4 - 17  3/5 - 17  3/6 - 15  3/7 - 15  3/8 - 12  3/9 - 14  3/10 - 16  3/11 - 16    Please advise.

## 2025-03-12 PROBLEM — I50.9 CHF, ACUTE ON CHRONIC (HCC): Status: ACTIVE | Noted: 2025-03-12

## 2025-03-12 LAB
ANION GAP SERPL CALCULATED.3IONS-SCNC: 11 MMOL/L (ref 4–13)
ANION GAP SERPL CALCULATED.3IONS-SCNC: 12 MMOL/L (ref 4–13)
ATRIAL RATE: 75 BPM
BUN SERPL-MCNC: 20 MG/DL (ref 5–25)
BUN SERPL-MCNC: 23 MG/DL (ref 5–25)
CALCIUM SERPL-MCNC: 7.3 MG/DL (ref 8.4–10.2)
CALCIUM SERPL-MCNC: 7.7 MG/DL (ref 8.4–10.2)
CHLORIDE SERPL-SCNC: 100 MMOL/L (ref 96–108)
CHLORIDE SERPL-SCNC: 92 MMOL/L (ref 96–108)
CO2 SERPL-SCNC: 25 MMOL/L (ref 21–32)
CO2 SERPL-SCNC: 29 MMOL/L (ref 21–32)
CREAT SERPL-MCNC: 1.32 MG/DL (ref 0.6–1.3)
CREAT SERPL-MCNC: 1.53 MG/DL (ref 0.6–1.3)
ERYTHROCYTE [DISTWIDTH] IN BLOOD BY AUTOMATED COUNT: 15.7 % (ref 11.6–15.1)
GFR SERPL CREATININE-BSD FRML MDRD: 49 ML/MIN/1.73SQ M
GFR SERPL CREATININE-BSD FRML MDRD: 59 ML/MIN/1.73SQ M
GLUCOSE SERPL-MCNC: 151 MG/DL (ref 65–140)
GLUCOSE SERPL-MCNC: 151 MG/DL (ref 65–140)
GLUCOSE SERPL-MCNC: 157 MG/DL (ref 65–140)
GLUCOSE SERPL-MCNC: 164 MG/DL (ref 65–140)
GLUCOSE SERPL-MCNC: 168 MG/DL (ref 65–140)
GLUCOSE SERPL-MCNC: 177 MG/DL (ref 65–140)
GLUCOSE SERPL-MCNC: 197 MG/DL (ref 65–140)
HCT VFR BLD AUTO: 33.3 % (ref 36.5–49.3)
HGB BLD-MCNC: 10.9 G/DL (ref 12–17)
MAGNESIUM SERPL-MCNC: 1.8 MG/DL (ref 1.9–2.7)
MAGNESIUM SERPL-MCNC: 1.8 MG/DL (ref 1.9–2.7)
MCH RBC QN AUTO: 30.3 PG (ref 26.8–34.3)
MCHC RBC AUTO-ENTMCNC: 32.7 G/DL (ref 31.4–37.4)
MCV RBC AUTO: 93 FL (ref 82–98)
P AXIS: 93 DEGREES
PLATELET # BLD AUTO: 204 THOUSANDS/UL (ref 149–390)
PMV BLD AUTO: 9.6 FL (ref 8.9–12.7)
POTASSIUM SERPL-SCNC: 3.7 MMOL/L (ref 3.5–5.3)
POTASSIUM SERPL-SCNC: 4.5 MMOL/L (ref 3.5–5.3)
PR INTERVAL: 154 MS
QRS AXIS: 81 DEGREES
QRSD INTERVAL: 76 MS
QT INTERVAL: 428 MS
QTC INTERVAL: 477 MS
RBC # BLD AUTO: 3.6 MILLION/UL (ref 3.88–5.62)
SODIUM SERPL-SCNC: 133 MMOL/L (ref 135–147)
SODIUM SERPL-SCNC: 136 MMOL/L (ref 135–147)
T WAVE AXIS: 78 DEGREES
VENTRICULAR RATE: 75 BPM
WBC # BLD AUTO: 5.91 THOUSAND/UL (ref 4.31–10.16)

## 2025-03-12 PROCEDURE — 82948 REAGENT STRIP/BLOOD GLUCOSE: CPT

## 2025-03-12 PROCEDURE — 99222 1ST HOSP IP/OBS MODERATE 55: CPT | Performed by: STUDENT IN AN ORGANIZED HEALTH CARE EDUCATION/TRAINING PROGRAM

## 2025-03-12 PROCEDURE — 93010 ELECTROCARDIOGRAM REPORT: CPT | Performed by: INTERNAL MEDICINE

## 2025-03-12 PROCEDURE — 85027 COMPLETE CBC AUTOMATED: CPT | Performed by: INTERNAL MEDICINE

## 2025-03-12 PROCEDURE — 36415 COLL VENOUS BLD VENIPUNCTURE: CPT | Performed by: INTERNAL MEDICINE

## 2025-03-12 PROCEDURE — 99223 1ST HOSP IP/OBS HIGH 75: CPT | Performed by: INTERNAL MEDICINE

## 2025-03-12 PROCEDURE — 83735 ASSAY OF MAGNESIUM: CPT

## 2025-03-12 PROCEDURE — 83735 ASSAY OF MAGNESIUM: CPT | Performed by: INTERNAL MEDICINE

## 2025-03-12 PROCEDURE — 99024 POSTOP FOLLOW-UP VISIT: CPT | Performed by: PHYSICIAN ASSISTANT

## 2025-03-12 PROCEDURE — 80048 BASIC METABOLIC PNL TOTAL CA: CPT

## 2025-03-12 PROCEDURE — 80048 BASIC METABOLIC PNL TOTAL CA: CPT | Performed by: INTERNAL MEDICINE

## 2025-03-12 RX ORDER — BUMETANIDE 0.25 MG/ML
1 INJECTION INTRAMUSCULAR; INTRAVENOUS CONTINUOUS
Status: DISCONTINUED | OUTPATIENT
Start: 2025-03-12 | End: 2025-03-14

## 2025-03-12 RX ORDER — SPIRONOLACTONE 25 MG/1
25 TABLET ORAL DAILY
Status: DISCONTINUED | OUTPATIENT
Start: 2025-03-12 | End: 2025-03-20 | Stop reason: HOSPADM

## 2025-03-12 RX ORDER — POTASSIUM CHLORIDE 1500 MG/1
40 TABLET, EXTENDED RELEASE ORAL ONCE
Status: COMPLETED | OUTPATIENT
Start: 2025-03-12 | End: 2025-03-12

## 2025-03-12 RX ORDER — MAGNESIUM SULFATE HEPTAHYDRATE 40 MG/ML
2 INJECTION, SOLUTION INTRAVENOUS ONCE
Status: COMPLETED | OUTPATIENT
Start: 2025-03-12 | End: 2025-03-12

## 2025-03-12 RX ORDER — LANOLIN ALCOHOL/MO/W.PET/CERES
400 CREAM (GRAM) TOPICAL 2 TIMES DAILY
Status: DISCONTINUED | OUTPATIENT
Start: 2025-03-12 | End: 2025-03-20 | Stop reason: HOSPADM

## 2025-03-12 RX ADMIN — Medication 1 MG/HR: at 01:15

## 2025-03-12 RX ADMIN — LORATADINE 10 MG: 10 TABLET ORAL at 08:32

## 2025-03-12 RX ADMIN — APIXABAN 5 MG: 5 TABLET, FILM COATED ORAL at 08:32

## 2025-03-12 RX ADMIN — MONTELUKAST 10 MG: 10 TABLET, FILM COATED ORAL at 21:04

## 2025-03-12 RX ADMIN — INSULIN LISPRO 1 UNITS: 100 INJECTION, SOLUTION INTRAVENOUS; SUBCUTANEOUS at 17:05

## 2025-03-12 RX ADMIN — PREGABALIN 50 MG: 50 CAPSULE ORAL at 21:04

## 2025-03-12 RX ADMIN — BUDESONIDE AND FORMOTEROL FUMARATE DIHYDRATE 2 PUFF: 160; 4.5 AEROSOL RESPIRATORY (INHALATION) at 08:33

## 2025-03-12 RX ADMIN — INSULIN LISPRO 1 UNITS: 100 INJECTION, SOLUTION INTRAVENOUS; SUBCUTANEOUS at 07:52

## 2025-03-12 RX ADMIN — INSULIN LISPRO 1 UNITS: 100 INJECTION, SOLUTION INTRAVENOUS; SUBCUTANEOUS at 21:57

## 2025-03-12 RX ADMIN — INSULIN LISPRO 1 UNITS: 100 INJECTION, SOLUTION INTRAVENOUS; SUBCUTANEOUS at 01:23

## 2025-03-12 RX ADMIN — MAGNESIUM OXIDE TAB 400 MG (241.3 MG ELEMENTAL MG) 400 MG: 400 (241.3 MG) TAB at 08:32

## 2025-03-12 RX ADMIN — FLUTICASONE PROPIONATE 1 SPRAY: 50 SPRAY, METERED NASAL at 19:46

## 2025-03-12 RX ADMIN — APIXABAN 5 MG: 5 TABLET, FILM COATED ORAL at 17:04

## 2025-03-12 RX ADMIN — ATORVASTATIN CALCIUM 10 MG: 10 TABLET, FILM COATED ORAL at 08:32

## 2025-03-12 RX ADMIN — CYANOCOBALAMIN TAB 500 MCG 1000 MCG: 500 TAB at 08:32

## 2025-03-12 RX ADMIN — Medication 1 MG/HR: at 09:17

## 2025-03-12 RX ADMIN — METOPROLOL SUCCINATE 50 MG: 50 TABLET, EXTENDED RELEASE ORAL at 08:32

## 2025-03-12 RX ADMIN — MAGNESIUM OXIDE TAB 400 MG (241.3 MG ELEMENTAL MG) 400 MG: 400 (241.3 MG) TAB at 17:04

## 2025-03-12 RX ADMIN — INSULIN LISPRO 1 UNITS: 100 INJECTION, SOLUTION INTRAVENOUS; SUBCUTANEOUS at 11:40

## 2025-03-12 RX ADMIN — POTASSIUM CHLORIDE 40 MEQ: 1500 TABLET, EXTENDED RELEASE ORAL at 19:46

## 2025-03-12 RX ADMIN — MAGNESIUM SULFATE HEPTAHYDRATE 2 G: 40 INJECTION, SOLUTION INTRAVENOUS at 20:06

## 2025-03-12 RX ADMIN — Medication 1 MG/HR: at 16:09

## 2025-03-12 RX ADMIN — FLUTICASONE PROPIONATE 1 SPRAY: 50 SPRAY, METERED NASAL at 08:33

## 2025-03-12 RX ADMIN — BUDESONIDE AND FORMOTEROL FUMARATE DIHYDRATE 2 PUFF: 160; 4.5 AEROSOL RESPIRATORY (INHALATION) at 19:46

## 2025-03-12 RX ADMIN — EMPAGLIFLOZIN 10 MG: 10 TABLET, FILM COATED ORAL at 08:33

## 2025-03-12 RX ADMIN — SPIRONOLACTONE 25 MG: 25 TABLET, FILM COATED ORAL at 08:33

## 2025-03-12 NOTE — APP STUDENT NOTE
MOLLY STUDENT  Inpatient Progress Note for TRAINING ONLY  Not Part of Legal Medical Record     Progress Note - Mesfin Cano 59 y.o. male MRN: [id number]  Unit/Bed#: [facility] 730-01 Encounter: [id number]    Assessment:    Principal Problem:    CHF, acute on chronic (HCC)  Active Problems:    Paroxysmal atrial fibrillation (HCC)    Stage 3b chronic kidney disease (HCC)    Type 2 diabetes mellitus without complication, without long-term current use of insulin (HCC)      Plan:    CHF, acute on chronic  Hx of HFpEF stage 3 NYHA III on Bumex presented to ED w/ TELLES, weight gain, leg swelling, abdominal distention x 1 week -- utilizes cardiac pressure sensor device which reported higher pressures from baseline    ECHO 6/24 showed  mild concentric hypertrophy. The left ventricular ejection fraction is 55%. Systolic function is normal. The following segments are akinetic: basal inferolateral and mid inferolateral.  3/11 Troponin WNL,   3/11 CXR showed no acute cardiopulmonary disease  ED started 2mg bumex IV with no urine output, currently on Bumex 12.5 mg infusion 50 mL and spironolactone tablet 25mg , metoprolol 50mg, and Jardiance 10mg  Metolazone 5 mg with additional potassium ordered by cardiology -- AM BMP  Continue to monitor renal function and electrolytes in setting of diuretic use and volume overload    Paroxysmal atrial fibrillation  [id number] score 3   Continue home medications metoprolol and eliquis    Stage 3b CKD  3/12 Cr 1.32, 1.14 on admission. Baseline 1.5-2  Will continue to monitor with daily Bmp in the setting of diureses and volume overload     Type 2 DM  Blood sugars uncontrolled in ED, last   Continue Jardiance and hold Januvia   Start patient on sliding scale, begin basal Lantus 22 units at bedtime and 7 units TID Humalog. Continue blood sugar checks before meals  Further management with PCP      VTE Pharmacologic Prophylaxis:   Pharmacologic: Apixaban (Eliquis)  Mechanical VTE Prophylaxis  in Place: No    Patient Centered Rounds: I have performed bedside rounds with nursing staff today.    Discussions with Specialists or Other Care Team Provider: n/a    Education and Discussions with Family / Patient: daughter    Time Spent for Care: 30 minutes.  More than 50% of total time spent on counseling and coordination of care as described above.    Current Length of Stay: 1 day(s)    Current Patient Status: Inpatient   Certification Statement: The patient will continue to require additional inpatient hospital stay due to CHF exacerbation    Discharge Plan: correlate with clinical course    Code Status: Level 1 - Full Code    Subjective:   Patient states he has cardiac pressure sensor device, which was elevated from his baseline. He currently feels volume accumulating in his legs up to his knee and abdomen. He feels no shortness of breath, chest pain.     Objective:     Vitals:   Temp (24hrs), Av.2 °F (36.8 °C), Min:98.1 °F (36.7 °C), Max:98.2 °F (36.8 °C)    Temp:  [98.1 °F (36.7 °C)-98.2 °F (36.8 °C)] 98.2 °F (36.8 °C)  HR:  [72-93] 88  Resp:  [18-24] 18  BP: (106-153)/(58-89) 134/85  SpO2:  [94 %-97 %] 96 %  Body mass index is 43.72 kg/m².     Input and Output Summary (last 24 hours):       Intake/Output Summary (Last 24 hours) at 3/12/2025 1237  Last data filed at 3/12/2025 1114  Gross per 24 hour   Intake --   Output 1800 ml   Net -1800 ml       Physical Exam:     Physical Exam  Constitutional:       Appearance: Normal appearance.   HENT:      Head: Normocephalic and atraumatic.      Nose: Nose normal.      Mouth/Throat:      Mouth: Mucous membranes are moist.   Cardiovascular:      Rate and Rhythm: Normal rate and regular rhythm.      Pulses: Normal pulses.      Heart sounds: Normal heart sounds.   Pulmonary:      Effort: Pulmonary effort is normal.      Breath sounds: Normal breath sounds.   Abdominal:      General: There is distension.   Musculoskeletal:      Cervical back: Normal range of motion.    Skin:     General: Skin is warm and dry.      Comments: Bilateral leg swelling up to knee    Neurological:      Mental Status: He is alert.       (    Historical Information   Past Medical History:   Diagnosis Date    Abnormal stress test 06/26/2024    Acute gastritis without hemorrhage     last assessed 3/24/17    Acute on chronic diastolic heart failure (HCC) 01/04/2025    Asthma     Atrial fibrillation (HCC)     Bilateral leg edema 02/19/2020    CHF (congestive heart failure) (HCC)     Coronary artery disease     Daytime sleepiness 07/17/2019    Diabetes mellitus (HCC)     Dyspnea on exertion 10/11/2021    Edema of both feet 01/23/2020    Electrolyte abnormality 10/05/2024    Gastric bypass status for obesity     HNP (herniated nucleus pulposus), lumbar 02/23/2021    Hyperlipidemia     Hypertension     Hypokalemia 11/14/2021    Impaired fasting glucose     last assessed 5/10/17    Knee sprain, bilateral 06/14/2018    Leg cramps 06/16/2022    Obesity     Status post cholecystectomy 02/17/2024    Uncontrolled atrial flutter (HCC) 06/18/2024    Viral gastroenteritis     last assessed 11/4/16     Past Surgical History:   Procedure Laterality Date    CARDIAC CATHETERIZATION N/A 3/9/2022    Procedure: CARDIAC RHC W/ PRESSURE SENSOR;  Surgeon: Aminata Wilcox MD;  Location: [facility];  Service: Cardiology    CARDIAC CATHETERIZATION N/A 9/6/2022    Procedure: Cardiac catheterization;  Surgeon: Yolanda Del Rosario DO;  Location: [facility];  Service: Cardiology    CARDIAC CATHETERIZATION N/A 9/6/2022    Procedure: Cardiac Coronary Angiogram;  Surgeon: Yolanda Del Rosario DO;  Location: [facility];  Service: Cardiology    CARDIAC CATHETERIZATION N/A 10/11/2023    Procedure: Cardiac RHC;  Surgeon: Yoel Darden MD;  Location: [facility];  Service: Cardiology    CARPAL TUNNEL RELEASE      bilateral    CHOLECYSTECTOMY LAPAROSCOPIC N/A 2/7/2024    Procedure: CHOLECYSTECTOMY LAPAROSCOPIC;   Surgeon: Nena Ferguson MD;  Location: [facility];  Service: General    GASTRIC BYPASS  2004    with han en y    HERNIA REPAIR      ventral inscisional    IR BIOPSY BONE MARROW  12/14/2023    LAPAROSCOPIC TRANSGASTRIC ACCESS FOR ERCP N/A 2/7/2024    Procedure: LAPAROSCOPIC TRANSGASTRIC ACCESS FOR ERCP;  Surgeon: Nena Ferguson MD;  Location: [facility];  Service: General    LAPAROSCOPIC TRANSGASTRIC ACCESS FOR ERCP N/A 2/7/2024    Procedure: ERCP, sphincterotomy, stone extraction;  Surgeon: Rey Ferguson MD;  Location: [facility];  Service: Gastroenterology    TONSILLECTOMY       Social History   Social History     Substance and Sexual Activity   Alcohol Use Yes    Alcohol/week: 2.0 standard drinks of alcohol    Types: 2 Shots of liquor per week    Comment: social     Social History     Substance and Sexual Activity   Drug Use No     Social History     Tobacco Use   Smoking Status Former    Current packs/day: 1.00    Average packs/day: 1 pack/day for 5.0 years (5.0 ttl pk-yrs)    Types: Pipe, Cigars, Cigarettes    Start date: 2016    Quit date: 1/1/2021    Passive exposure: Never   Smokeless Tobacco Former   Tobacco Comments    cigar once a day     Family History: Family history non-contributory    Meds/Allergies   all medications and allergies reviewed  Allergies   Allergen Reactions    Azithromycin Other (See Comments)     Shaky, uneasy feeling     Bactrim [Sulfamethoxazole-Trimethoprim] Other (See Comments)     shakiness       Additional Data:     Labs:    Results from last 7 days   Lab Units 03/12/25  0538 03/11/25  2134   WBC Thousand/uL 5.91 5.26   HEMOGLOBIN g/dL 10.9* 11.0*   HEMATOCRIT % 33.3* 33.5*   PLATELETS Thousands/uL 204 208   SEGS PCT %  --  60   LYMPHO PCT %  --  22   MONO PCT %  --  10   EOS PCT %  --  6     Results from last 7 days   Lab Units 03/12/25  0538 03/11/25  2134   SODIUM mmol/L 136 136   POTASSIUM mmol/L 4.5 4.1   CHLORIDE mmol/L 100 102   CO2 mmol/L 25 22   BUN mg/dL 20  18   CREATININE mg/dL 1.32* 1.14   ANION GAP mmol/L 11 12   CALCIUM mg/dL 7.3* 7.2*   ALBUMIN g/dL  --  3.2*   TOTAL BILIRUBIN mg/dL  --  0.30   ALK PHOS U/L  --  83   ALT U/L  --  6*   AST U/L  --  10*   GLUCOSE RANDOM mg/dL 168* 170*         Results from last 7 days   Lab Units 03/12/25  1135 03/12/25  0749 03/12/25  0114   POC GLUCOSE mg/dl 157* 197* 151*     Results from last 7 days   Lab Units 03/10/25  0906   HEMOGLOBIN A1C  6.3             * I Have Reviewed All Lab Data Listed Above.  * Additional Pertinent Lab Tests Reviewed: All Labs For Current Hospital Admission Reviewed    Imaging:    Imaging Reports Reviewed Today Include: n/a  Imaging Personally Reviewed by Myself Includes:  n/a    Recent Cultures (last 7 days):           Last 24 Hours Medication List:   Current Facility-Administered Medications   Medication Dose Route Frequency Provider Last Rate    acetaminophen  650 mg Oral Q6H PRN Justino Prasad, DO      apixaban  5 mg Oral BID Justino Prasad, DO      atorvastatin  10 mg Oral Daily Justino Prasad, DO      budesonide-formoterol  2 puff Inhalation BID Justino Prasad, DO      bumetanide (BUMEX) 12.5 mg infusion 50 mL  1 mg/hr Intravenous Continuous Justino Prasad, DO 1 mg/hr (03/12/25 0917)    cyanocobalamin  1,000 mcg Oral Daily Justino Prasad, DO      Empagliflozin  10 mg Oral Daily Justino Prasad, DO      famotidine  20 mg Oral BID PRN Justino Prasad, DO      fluticasone  1 spray Nasal BID Justino Prasad, DO      insulin lispro  1-5 Units Subcutaneous TID AC Justino Prasad, DO      insulin lispro  1-5 Units Subcutaneous HS Justino Prasad, DO      loratadine  10 mg Oral Daily Justino Prasad, DO      magnesium Oxide  400 mg Oral BID Mary Naik PA-C      metoprolol succinate  50 mg Oral Daily Justino Prasad, DO      montelukast  10 mg Oral HS Justino Prasad, DO      pregabalin  50 mg Oral HS Justino Prasad, DO      spironolactone  25 mg Oral Daily Justino Prasad, DO          Today, Patient Was Seen By: Franchesca  Ricky    ** Please Note: Dictation voice to text software may have been used in the creation of this document. **

## 2025-03-12 NOTE — ED ATTENDING ATTESTATION
Final Diagnoses:     1. Dyspnea    2. Leg swelling    3. CHF exacerbation (HCC)    4. Elevated brain natriuretic peptide (BNP) level      ED Course as of 03/13/25 1419   Tue Mar 11, 2025   2345 BNP(!): 319       I, Alek Lweis MD, saw and evaluated the patient. All available labs and X-rays were ordered by me or the resident / non-physician and have been reviewed by myself. I discussed the patient with the resident / non-physician and agree with the resident's / non-physician practitioner's findings and plan as documented in the resident's / non-physician practicitioner's note, except where noted.   At this point, I agree with the current assessment done in the ED.   I was present during key portions of all procedures performed unless otherwise stated.     HPI:  NURSING TRIAGE:    This is a 59 y.o. male presenting for evaluation of increased weight, CHF exacerbation  315 ? 333 over a week.  Compliant with medications  Feels his abdomen is full.  Feels breathing is more challenging. Normally would have dyspnea with walking up steps but in the last few hours, it's much worse.  Always has clear lungs in the past with his failure episodes  No CP.  Takes Bumex, 6mg TID. No changes inthat.  Takes Metalazone (3x/week). Took extra dose but made no difference really.   Spoke to cardiology nurse + Dr. Wilcox ? got worse ? so came in.   [place] shaky today  No f/cough.   No belly pain.   25th admission for CHF since June 2021.     PSH: Gastric bypass in 2007.  Chief Complaint   Patient presents with    Shortness of Breath     Pt c/o of SOB that started last this afternoon that has been worsening tonight. Pt has CHF and believes he is fluid overloaded. Pt gained 15 lbs in the past few days.       PHYSICAL: ASSESSMENT + PLAN:   General: VSS, NAD, awake, alert. Well-nourished, well-developed. Appears stated age.   Speaking normally in full sentences.   Head: Normocephalic, atraumatic, nontender.  Eyes: PERRL, EOM-I. No  diplopia.   No hyphema.   No subconjunctival hemorrhages.  Symmetrical lids.   ENT: Atraumatic external nose and ears.    MMM  No malocclusion. No stridor. Normal phonation. No drooling. Normal swallowing.   Neck: Symmetric, trachea midline. No JVD.  CV: RRR. +S1/S2  No murmurs or gallops  Peripheral pulses +2 throughout. No chest wall tenderness.   Lungs:   Unlabored No retractions  CTAB, lungs sounds equal bilateral.   No tachypnea.   Abd: +BS, soft, NT/ND.   MSK:   FROM   Back:   No rashes  Skin: Dry, intact.   Neuro: AAOx3, GCS 15, CN II-XII grossly intact.   Motor grossly intact.  Psychiatric/Behavioral: Appropriate mood and affect   Exam: deferred    Vitals:    03/13/25 0841 03/13/25 0900 03/13/25 1053 03/13/25 1304   BP: 154/85  126/82 129/91   Pulse: 86  89 92   Resp:   18    Temp:   98.3 °F (36.8 °C)    TempSrc:       SpO2:   94% 97%   Weight:  (!) 146 kg (322 lb 12.1 oz)     Height:        - Given patient's concerns, will do a cardiac workup.   - Will do an EKG for arrythmia, strain; troponin for same as per protocol for evaluation of ACS.   - CBC for anemia; CMP for kidney function and electrolytes.   - Will check CXR for pneumonia, PTX, fluid overload  - Bumex.   HEART score:  History 0=Slightly or non-suspicious   ECG 1=Nonspecific repolarization disturbance   Age 1= > 45 - <65 years   Risk Factors 2= > 3 risk factors, or history of atherosclerotic disease   Troponin 1= Between 13-35 ng/L   Total 5   - Disposition per workup.     Past Medical History:   Diagnosis Date    Abnormal stress test 06/26/2024    Acute gastritis without hemorrhage     last assessed 3/24/17    Acute on chronic diastolic heart failure (HCC) 01/04/2025    Asthma     Atrial fibrillation (HCC)     Bilateral leg edema 02/19/2020    CHF (congestive heart failure) ([facility])     Coronary artery disease     Daytime sleepiness 07/17/2019    Diabetes mellitus ([facility])     Dyspnea on exertion 10/11/2021    Edema of both feet 01/23/2020     Electrolyte abnormality 10/05/2024    Gastric bypass status for obesity     HNP (herniated nucleus pulposus), lumbar 02/23/2021    Hyperlipidemia     Hypertension     Hypokalemia 11/14/2021    Impaired fasting glucose     last assessed 5/10/17    Knee sprain, bilateral 06/14/2018    Leg cramps 06/16/2022    Obesity     Status post cholecystectomy 02/17/2024    Uncontrolled atrial flutter (HCC) 06/18/2024    Viral gastroenteritis     last assessed 11/4/16          There are no obvious limitations to social determinants of care.   Nursing note reviewed.   Vitals reviewed.   Orders placed by myself and/or advanced practitioner / resident.    Previous chart was reviewed  History obtained from: Family and Patient  Language barrier: None  Limitations to the history obtained: None Critical Care Time:      Past Medical: Past Surgical:    has a past medical history of Abnormal stress test (06/26/2024), Acute gastritis without hemorrhage, Acute on chronic diastolic heart failure (HCC) (01/04/2025), Asthma, Atrial fibrillation ([facility]), Bilateral leg edema (02/19/2020), CHF (congestive heart failure) ([facility]), Coronary artery disease, Daytime sleepiness (07/17/2019), Diabetes mellitus ([facility]), Dyspnea on exertion (10/11/2021), Edema of both feet (01/23/2020), Electrolyte abnormality (10/05/2024), Gastric bypass status for obesity, HNP (herniated nucleus pulposus), lumbar (02/23/2021), Hyperlipidemia, Hypertension, Hypokalemia (11/14/2021), Impaired fasting glucose, Knee sprain, bilateral (06/14/2018), Leg cramps (06/16/2022), Obesity, Status post cholecystectomy (02/17/2024), Uncontrolled atrial flutter (HCC) (06/18/2024), and Viral gastroenteritis.  has a past surgical history that includes Gastric bypass (2004); Hernia repair; Carpal tunnel release; Tonsillectomy; Cardiac catheterization (N/A, 3/9/2022); Cardiac catheterization (N/A, 9/6/2022); Cardiac catheterization (N/A, 9/6/2022); Cardiac catheterization (N/A, 10/11/2023); IR  biopsy bone marrow (2023); CHOLECYSTECTOMY LAPAROSCOPIC (N/A, 2024); LAPAROSCOPIC TRANSGASTRIC ACCESS FOR ERCP (N/A, 2024); and LAPAROSCOPIC TRANSGASTRIC ACCESS FOR ERCP (N/A, 2024).   Social: Cardiac (Echo/Cath)   Social History     Substance and Sexual Activity   Alcohol Use Yes    Alcohol/week: 2.0 standard drinks of alcohol    Types: 2 Shots of liquor per week    Comment: social     Social History     Tobacco Use   Smoking Status Former    Current packs/day: 1.00    Average packs/day: 1 pack/day for 5.0 years (5.0 ttl pk-yrs)    Types: Pipe, Cigars, Cigarettes    Start date:     Quit date: 2021    Passive exposure: Never   Smokeless Tobacco Former   Tobacco Comments    cigar once a day     Social History     Substance and Sexual Activity   Drug Use No    Results for orders placed during the hospital encounter of 21    Echo complete with contrast if indicated    Narrative  [place] [id number] [phone]    Transthoracic Echocardiogram  2D, M-mode, Doppler, and Color Doppler    Study date:  2021    Patient: KARY GRANADOS  MR number: [id number]  Account number: [id number]  : 1965  Age: 56 years  Gender: Male  Status: Inpatient  Location: Bedside  Height: 73 in  Weight: 359.3 lb  BP: 177/ 91 mmHg    Indications: Assess congestive heart failure.    Diagnoses: I42.9 - Cardiomyopathy, unspecified    Sonographer:  TOSHIA Foster  Primary Physician:  Soo Chavez MD  Group:  [facility]  Cardiology Fellow:  Babak Badillo  Interpreting Physician:  Rohith Syed MD    SUMMARY    LEFT VENTRICLE:  Size was at the upper limits of normal.  Systolic function was at the lower limits of normal. Ejection fraction was estimated to be 50 %.  There were no regional wall motion abnormalities.  There was akinesis of the basal inferior and basal-mid inferolateral wall(s).  There  was no evidence of concentric hypertrophy.  Doppler parameters were consistent with abnormal left ventricular relaxation (grade 1 diastolic dysfunction).    LEFT ATRIUM:  The atrium was mildly dilated.    MITRAL VALVE:  There was mild annular calcification.  There was trace regurgitation.    TRICUSPID VALVE:  There was trace regurgitation.    IVC, HEPATIC VEINS:  The inferior vena cava was mildly dilated.    PROCEDURE: The procedure was performed at the bedside. This was a routine study. The transthoracic approach was used. The study included complete 2D imaging, M-mode, complete spectral Doppler, and color Doppler. The heart rate was 87 bpm,  at the start of the study. Images were obtained from the parasternal, apical, subcostal, and suprasternal notch acoustic windows. Echocardiographic views were limited due to restricted patient mobility, decreased penetration, and lung  interference. This was a technically difficult study.    LEFT VENTRICLE: Size was at the upper limits of normal. Systolic function was at the lower limits of normal. Ejection fraction was estimated to be 50 %. There were no regional wall motion abnormalities. There was akinesis of the basal  inferior and basal-mid inferolateral wall(s). Wall thickness was normal. There was no evidence of concentric hypertrophy. DOPPLER: Doppler parameters were consistent with abnormal left ventricular relaxation (grade 1 diastolic dysfunction).    RIGHT VENTRICLE: The size was normal. Systolic function was normal. Wall thickness was normal.    LEFT ATRIUM: The atrium was mildly dilated.    RIGHT ATRIUM: Size was normal.    MITRAL VALVE: There was mild annular calcification. Valve structure was normal. There was normal leaflet separation. DOPPLER: The transmitral velocity was within the normal range. There was no evidence for stenosis. There was trace  regurgitation.    AORTIC VALVE: The valve was trileaflet. Leaflets exhibited mildly increased thickness, mild  calcification, and normal cuspal separation. DOPPLER: Transaortic velocity was within the normal range. There was no evidence for stenosis. There  was no significant regurgitation.    TRICUSPID VALVE: The valve structure was normal. There was normal leaflet separation. DOPPLER: The transtricuspid velocity was within the normal range. There was no evidence for stenosis. There was trace regurgitation.    PULMONIC VALVE: Leaflets exhibited normal thickness, no calcification, and normal cuspal separation. DOPPLER: The transpulmonic velocity was within the normal range. There was no significant regurgitation.    PERICARDIUM: There was no pericardial effusion. The pericardium was normal in appearance.    AORTA: The root exhibited normal size. The ascending aorta was normal in size.    SYSTEMIC VEINS: IVC: The inferior vena cava was mildly dilated. Respirophasic changes were normal.    SYSTEM MEASUREMENT TABLES    2D  %FS: 21.53 %  Ao Diam: 3.19 cm  Ao asc: 3.32 cm  EDV(Teich): 188.84 ml  EF(Teich): 42.83 %  ESV(Teich): 107.95 ml  IVSd: 0.8 cm  LA Area: 19.94 cm2  LA Diam: 4.66 cm  LVEDV MOD A4C: 126.08 ml  LVEF MOD A4C: 46.23 %  LVESV MOD A4C: 67.8 ml  LVIDd: 6.13 cm  LVIDs: 4.81 cm  LVLd A4C: 8.23 cm  LVLs A4C: 7.32 cm  LVPWd: 0.78 cm  RA Area: 20.73 cm2  RVIDd: 3.74 cm  SV MOD A4C: 58.28 ml  SV(Teich): 80.89 ml    MM  TAPSE: 2.77 cm    PW  E' Sept: 0.08 m/s  E/E' Sept: 9.01  MV A Zach: 0.84 m/s  MV Dec Pennington: 3.3 m/s2  MV DecT: 228.2 ms  MV E Zach: 0.75 m/s  MV E/A Ratio: 0.89  MV PHT: 66.18 ms  MVA By PHT: 3.32 cm2    [employer] Commission Accredited Echocardiography Laboratory    Prepared and electronically signed by    Rohith Syed MD  Signed 20-Jul-2021 07:13:15    No results found for this or any previous visit.    No results found for this or any previous visit.     Labs: Imaging:   Labs Reviewed   CBC AND DIFFERENTIAL - Abnormal       Result Value Ref Range Status    WBC 5.26  4.31 - 10.16 Thousand/uL Final     RBC 3.58 (*) 3.88 - 5.62 Million/uL Final    Hemoglobin 11.0 (*) 12.0 - 17.0 g/dL Final    Hematocrit 33.5 (*) 36.5 - 49.3 % Final    MCV 94  82 - 98 fL Final    MCH 30.7  26.8 - 34.3 pg Final    MCHC 32.8  31.4 - 37.4 g/dL Final    RDW 15.6 (*) 11.6 - 15.1 % Final    MPV 8.9  8.9 - 12.7 fL Final    Platelets 208  149 - 390 Thousands/uL Final    nRBC 0  /100 WBCs Final    Segmented % 60  43 - 75 % Final    Immature Grans % 1  0 - 2 % Final    Lymphocytes % 22  14 - 44 % Final    Monocytes % 10  4 - 12 % Final    Eosinophils Relative 6  0 - 6 % Final    Basophils Relative 1  0 - 1 % Final    Absolute Neutrophils 3.12  1.85 - 7.62 Thousands/µL Final    Absolute Immature Grans 0.06  0.00 - 0.20 Thousand/uL Final    Absolute Lymphocytes 1.18  0.60 - 4.47 Thousands/µL Final    Absolute Monocytes 0.52  0.17 - 1.22 Thousand/µL Final    Eosinophils Absolute 0.32  0.00 - 0.61 Thousand/µL Final    Basophils Absolute 0.06  0.00 - 0.10 Thousands/µL Final   COMPREHENSIVE METABOLIC PANEL - Abnormal    Sodium 136  135 - 147 mmol/L Final    Potassium 4.1  3.5 - 5.3 mmol/L Final    Chloride 102  96 - 108 mmol/L Final    CO2 22  21 - 32 mmol/L Final    ANION GAP 12  4 - 13 mmol/L Final    BUN 18  5 - 25 mg/dL Final    Creatinine 1.14  0.60 - 1.30 mg/dL Final    Comment: Standardized to IDMS reference method    Glucose 170 (*) 65 - 140 mg/dL Final    Comment: If the patient is fasting, the ADA then defines impaired fasting glucose as > 100 mg/dL and diabetes as > or equal to 123 mg/dL.    Calcium 7.2 (*) 8.4 - 10.2 mg/dL Final    Corrected Calcium 7.8 (*) 8.3 - 10.1 mg/dL Final    AST 10 (*) 13 - 39 U/L Final    ALT 6 (*) 7 - 52 U/L Final    Comment: Specimen collection should occur prior to Sulfasalazine administration due to the potential for falsely depressed results.     Alkaline Phosphatase 83  34 - 104 U/L Final    Total Protein 5.8 (*) 6.4 - 8.4 g/dL Final    Albumin 3.2 (*) 3.5 - 5.0 g/dL Final    Total Bilirubin 0.30  0.20  - 1.00 mg/dL Final    Comment: Use of this assay is not recommended for patients undergoing treatment with eltrombopag due to the potential for falsely elevated results.  N-acetyl-p-benzoquinone imine (metabolite of Acetaminophen) will generate erroneously low results in samples for patients that have taken an overdose of Acetaminophen.    eGFR 70  ml/min/1.73sq m Final    Narrative:     National Kidney Disease Foundation guidelines for Chronic Kidney Disease (CKD):     Stage 1 with normal or high GFR (GFR > 90 mL/min/1.73 square meters)    Stage 2 Mild CKD (GFR = 60-89 mL/min/1.73 square meters)    Stage 3A Moderate CKD (GFR = 45-59 mL/min/1.73 square meters)    Stage 3B Moderate CKD (GFR = 30-44 mL/min/1.73 square meters)    Stage 4 Severe CKD (GFR = 15-29 mL/min/1.73 square meters)    Stage 5 End Stage CKD (GFR <15 mL/min/1.73 square meters)  Note: GFR calculation is accurate only with a steady state creatinine   B-TYPE NATRIURETIC PEPTIDE (BNP) - Abnormal     (*) 0 - 100 pg/mL Final   BASIC METABOLIC PANEL - Abnormal    Sodium 136  135 - 147 mmol/L Final    Potassium 4.5  3.5 - 5.3 mmol/L Final    Chloride 100  96 - 108 mmol/L Final    CO2 25  21 - 32 mmol/L Final    ANION GAP 11  4 - 13 mmol/L Final    BUN 20  5 - 25 mg/dL Final    Creatinine 1.32 (*) 0.60 - 1.30 mg/dL Final    Comment: Standardized to IDMS reference method    Glucose 168 (*) 65 - 140 mg/dL Final    Comment: If the patient is fasting, the ADA then defines impaired fasting glucose as > 100 mg/dL and diabetes as > or equal to 123 mg/dL.    Calcium 7.3 (*) 8.4 - 10.2 mg/dL Final    eGFR 59  ml/min/1.73sq m Final    Narrative:     National Kidney Disease Foundation guidelines for Chronic Kidney Disease (CKD):     Stage 1 with normal or high GFR (GFR > 90 mL/min/1.73 square meters)    Stage 2 Mild CKD (GFR = 60-89 mL/min/1.73 square meters)    Stage 3A Moderate CKD (GFR = 45-59 mL/min/1.73 square meters)    Stage 3B Moderate CKD (GFR = 30-44  "mL/min/1.73 square meters)    Stage 4 Severe CKD (GFR = 15-29 mL/min/1.73 square meters)    Stage 5 End Stage CKD (GFR <15 mL/min/1.73 square meters)  Note: GFR calculation is accurate only with a steady state creatinine   MAGNESIUM - Abnormal    Magnesium 1.8 (*) 1.9 - 2.7 mg/dL Final   CBC AND PLATELET - Abnormal    WBC 5.91  4.31 - 10.16 Thousand/uL Final    RBC 3.60 (*) 3.88 - 5.62 Million/uL Final    Hemoglobin 10.9 (*) 12.0 - 17.0 g/dL Final    Hematocrit 33.3 (*) 36.5 - 49.3 % Final    MCV 93  82 - 98 fL Final    MCH 30.3  26.8 - 34.3 pg Final    MCHC 32.7  31.4 - 37.4 g/dL Final    RDW 15.7 (*) 11.6 - 15.1 % Final    Platelets 204  149 - 390 Thousands/uL Final    MPV 9.6  8.9 - 12.7 fL Final   POCT GLUCOSE - Abnormal    POC Glucose 151 (*) 65 - 140 mg/dl Final   POCT GLUCOSE - Abnormal    POC Glucose 197 (*) 65 - 140 mg/dl Final   POCT GLUCOSE - Abnormal    POC Glucose 157 (*) 65 - 140 mg/dl Final   HS TROPONIN I 0HR - Normal    hs TnI 0hr 14  \"Refer to ACS Flowchart\"- see link ng/L Final    Comment:                                              Initial (time 0) result  If >=50 ng/L, Myocardial injury suggested ;  Type of myocardial injury and treatment strategy  to be determined.  If 5-49 ng/L, a delta result at 2 hours and or 4 hours will be needed to further evaluate.  If <4 ng/L, and chest pain has been >3 hours since onset, patient may qualify for discharge based on the HEART score in the ED.  If <5 ng/L and <3hours since onset of chest pain, a delta result at 2 hours will be needed to further evaluate.    HS Troponin 99th Percentile URL of a Health Population=12 ng/L with a 95% Confidence Interval of 8-18 ng/L.    Second Troponin (time 2 hours)  If calculated delta >= 20 ng/L,  Myocardial injury suggested ; Type of myocardial injury and treatment strategy to be determined.  If 5-49 ng/L and the calculated delta is 5-19 ng/L, consult medical service for evaluation.  Continue evaluation for ischemia on " "ecg and other possible etiology and repeat hs troponin at 4 hours.  If delta is <5 ng/L at 2 hours, consider discharge based on risk stratification via the HEART score (if in ED), or ISATU risk score in IP/Observation.    HS Troponin 99th Percentile URL of a Health Population=12 ng/L with a 95% Confidence Interval of 8-18 ng/L.   HS TROPONIN I 2HR - Normal    hs TnI 2hr 12  \"Refer to ACS Flowchart\"- see link ng/L Final    Comment:                                              Initial (time 0) result  If >=50 ng/L, Myocardial injury suggested ;  Type of myocardial injury and treatment strategy  to be determined.  If 5-49 ng/L, a delta result at 2 hours and or 4 hours will be needed to further evaluate.  If <4 ng/L, and chest pain has been >3 hours since onset, patient may qualify for discharge based on the HEART score in the ED.  If <5 ng/L and <3hours since onset of chest pain, a delta result at 2 hours will be needed to further evaluate.    HS Troponin 99th Percentile URL of a Health Population=12 ng/L with a 95% Confidence Interval of 8-18 ng/L.    Second Troponin (time 2 hours)  If calculated delta >= 20 ng/L,  Myocardial injury suggested ; Type of myocardial injury and treatment strategy to be determined.  If 5-49 ng/L and the calculated delta is 5-19 ng/L, consult medical service for evaluation.  Continue evaluation for ischemia on ecg and other possible etiology and repeat hs troponin at 4 hours.  If delta is <5 ng/L at 2 hours, consider discharge based on risk stratification via the HEART score (if in ED), or ISATU risk score in IP/Observation.    HS Troponin 99th Percentile URL of a Health Population=12 ng/L with a 95% Confidence Interval of 8-18 ng/L.    Delta 2hr hsTnI -2  <20 ng/L Final    XR chest 2 views   Final Result      No acute cardiopulmonary disease.            Workstation performed: [id number]            Medications: Code Status:   Medications   acetaminophen (TYLENOL) tablet 650 mg (has no " administration in time range)   apixaban (ELIQUIS) tablet 5 mg (5 mg Oral Given 3/13/25 0841)   atorvastatin (LIPITOR) tablet 10 mg (10 mg Oral Given 3/13/25 0841)   budesonide-formoterol (SYMBICORT) 160-4.5 mcg/act inhaler 2 puff (2 puffs Inhalation Given 3/13/25 0844)   cyanocobalamin (VITAMIN B-12) tablet 1,000 mcg (1,000 mcg Oral Given 3/13/25 0841)   Empagliflozin (JARDIANCE) tablet 10 mg (10 mg Oral Given 3/13/25 0949)   famotidine (PEPCID) tablet 20 mg (has no administration in time range)   fluticasone (FLONASE) 50 mcg/act nasal spray 1 spray (1 spray Nasal Given 3/13/25 0844)   loratadine (CLARITIN) tablet 10 mg (10 mg Oral Given 3/13/25 0841)   metoprolol succinate (TOPROL-XL) 24 hr tablet 50 mg (50 mg Oral Given 3/13/25 0841)   montelukast (SINGULAIR) tablet 10 mg (10 mg Oral Given 3/12/25 2104)   pregabalin (LYRICA) capsule 50 mg (50 mg Oral Given 3/12/25 2104)   insulin lispro (HumALOG/ADMELOG) 100 units/mL subcutaneous injection 1-5 Units (1 Units Subcutaneous Given 3/13/25 1208)   insulin lispro (HumALOG/ADMELOG) 100 units/mL subcutaneous injection 1-5 Units (1 Units Subcutaneous Given 3/12/25 2157)   bumetanide (BUMEX) 12.5 mg infusion 50 mL (1 mg/hr Intravenous New Bag 3/13/25 1301)   spironolactone (ALDACTONE) tablet 25 mg (25 mg Oral Given 3/13/25 0841)   magnesium Oxide (MAG-OX) tablet 400 mg (400 mg Oral Given 3/13/25 0841)   potassium chloride (Klor-Con M20) CR tablet 40 mEq (40 mEq Oral Given 3/13/25 0841)   nystatin (MYCOSTATIN) powder (1 Application Topical Given 3/13/25 1249)   metolazone (ZAROXOLYN) tablet 2.5 mg (2.5 mg Oral Given 3/13/25 1416)   bumetanide (BUMEX) injection 2 mg (2 mg Intravenous Given 3/11/25 2200)   magnesium sulfate 2 g/50 mL IVPB (premix) 2 g (2 g Intravenous New Bag 3/12/25 2006)   potassium chloride (Klor-Con M20) CR tablet 40 mEq (40 mEq Oral Given 3/12/25 1946)   magnesium sulfate 2 g/50 mL IVPB (premix) 2 g (2 g Intravenous New Bag 3/13/25 0742)    Code Status:  Level 1 - Full Code  Advance Directive and Living Will:      Power of :    POLST:       Orders Placed This Encounter   Procedures    XR chest 2 views    CBC and differential    Comprehensive metabolic panel    HS Troponin 0hr (reflex protocol)    B-Type Natriuretic Peptide(BNP)    HS Troponin I 2hr    Basic metabolic panel    Magnesium    CBC (With Platelets)    Basic metabolic panel    Magnesium    Basic metabolic panel    Magnesium    Basic metabolic panel    Magnesium    Diet Cardiovascular; Cardiac; Fluid Restriction 1800 ML    Insert peripheral IV    Nursing communication Continue IV as ordered.    Notify admitting physician    Notify admitting physician on arrival    Vital Signs per unit routine    Strict I/O    Nursing Communication Measure urine output for 6 hours after first diuretic dose given. If urine output is less than 900 ml, call provider for additional diuretic doses.    Daily weights- Use standing scale to weigh patient    Provide Patient with Heart Failure Education    Nursing communication ECG 12 lead with chest pain or ST segment change and notify MD. Place an ECG order if performed.    Activity as Tolerated    Out of Bed to Chair    Insert peripheral IV    Maintain IV access    Apply SCD or Foot pumps    Insulin Subcutaneous Notify Physician    Insulin Subcutaneous Instruction    Hypoglycemia Protocol    Fingerstick Glucose (POCT)    Urinary retention protocol    Intake and Output    Daily weights    24 Hour Telemetry Monitoring    Skin care    Skin care    Skin care    Turn patient    Skin care    Level 1-Full Code: all life saving measures are indicated    Inpatient consult to Heart Failure Service    ECG 12 lead    Inpatient Consult to Wound Care Nurse    INPATIENT ADMISSION     Time reflects when diagnosis was documented in both MDM as applicable and the Disposition within this note       Time User Action Codes Description Comment    3/11/2025 11:38 PM Yun St Add [R06.00]  Dyspnea     3/11/2025 11:39 PM Yun St Add [M79.89] Leg swelling     3/11/2025 11:39 PM Yun [address] T Add [I50.9] CHF exacerbation (HCC)     3/11/2025 11:39 PM StRachel minoru T Add [R79.89] Elevated brain natriuretic peptide (BNP) level           ED Disposition       ED Disposition   Admit    Condition   Stable    Date/Time   Tue Mar 11, 2025 11:38 PM    Comment                  Follow-up Information    None       Current Discharge Medication List        CONTINUE these medications which have NOT CHANGED    Details   Accu-Chek FastClix Lancets MISC Test blood sugar twice daily  Qty: 200 each, Refills: 3    Associated Diagnoses: Type 2 diabetes mellitus with hyperglycemia, without long-term current use of insulin ([facility])      acetaminophen (TYLENOL) 325 mg tablet Take 2 tablets (650 mg total) by mouth every 6 (six) hours as needed for mild pain, headaches or fever    Associated Diagnoses: Acute gallstone pancreatitis; Choledocholithiasis      acetaminophen (TYLENOL) 650 mg CR tablet Take 1 tablet (650 mg total) by mouth every 8 (eight) hours as needed for mild pain  Qty: 30 tablet, Refills: 0    Associated Diagnoses: Knee laceration      albuterol (PROVENTIL HFA,VENTOLIN HFA) 90 mcg/act inhaler Inhale 2 puffs every 4 (four) hours as needed for wheezing  Qty: 18 g, Refills: 0    Comments: Substitution to a formulary equivalent within the same pharmaceutical class is authorized.  Associated Diagnoses: Mild intermittent asthma without complication      apixaban (Eliquis) 5 mg Take 1 tablet (5 mg total) by mouth 2 (two) times a day  Qty: 180 tablet, Refills: 0    Associated Diagnoses: A-fib ([facility]); Atrial fibrillation, unspecified type ([facility])      atorvastatin (LIPITOR) 10 mg tablet TAKE 1 TABLET BY MOUTH EVERY DAY  Qty: 30 tablet, Refills: 5    Associated Diagnoses: Mixed hyperlipidemia      Blood Glucose Monitoring Suppl (Accu-Chek Guide) w/Device KIT Use 2 (two) times a day Test blood sugar twice daily  Qty: 1 kit,  Refills: 0    Associated Diagnoses: Type 2 diabetes mellitus with hyperglycemia, without long-term current use of insulin ([facility])      budesonide-formoterol (Symbicort) 160-4.5 mcg/act inhaler Inhale 2 puffs 2 (two) times a day Rinse mouth after use.  Qty: 30.6 g, Refills: 2    Associated Diagnoses: Moderate persistent asthma with acute exacerbation      bumetanide (BUMEX) 2 mg tablet TAKE 3 TABLETS (6 MG TOTAL) BY MOUTH 3 (THREE) TIMES A DAY  Qty: 810 tablet, Refills: 1    Associated Diagnoses: Chronic heart failure with preserved ejection fraction ([facility])      cyanocobalamin (VITAMIN B-12) 1000 MCG tablet Take 1 tablet (1,000 mcg total) by mouth daily  Qty: 30 tablet, Refills: 0    Associated Diagnoses: Vitamin B12 deficiency      Empagliflozin (JARDIANCE) 10 MG TABS tablet Take 1 tablet (10 mg total) by mouth daily  Qty: 30 tablet, Refills: 11    Associated Diagnoses: Acute on chronic right-sided heart failure ([facility]); Type 2 diabetes mellitus with stage 3b chronic kidney disease, without long-term current use of insulin ([facility])      famotidine (PEPCID) 20 mg tablet Take 1 tablet (20 mg total) by mouth if needed for heartburn As needed twice a day    Associated Diagnoses: Chest pain      fluticasone (FLONASE) 50 mcg/act nasal spray 1 spray into each nostril 2 (two) times a day  Refills: 0    Associated Diagnoses: Nasal congestion      loratadine (CLARITIN) 10 mg tablet Take 1 tablet (10 mg total) by mouth if needed for allergies As needed daily    Associated Diagnoses: Nasal congestion      metolazone (ZAROXOLYN) 2.5 mg tablet Take 2 tablets (5 mg total) by mouth 3 (three) times a week Take 20-30 minutes before Bumex dose and with additional potassium  Qty: 180 tablet, Refills: 0    Associated Diagnoses: Chronic heart failure with preserved ejection fraction ([facility])      metoprolol succinate (TOPROL-XL) 50 mg 24 hr tablet TAKE 1 TABLET BY MOUTH EVERY DAY  Qty: 90 tablet, Refills: 1    Associated Diagnoses: Chronic heart  failure with preserved ejection fraction ([facility])      montelukast (SINGULAIR) 10 mg tablet Take 1 tablet (10 mg total) by mouth daily at bedtime  Qty: 90 tablet, Refills: 1    Associated Diagnoses: Moderate persistent asthma with acute exacerbation      nitroglycerin (NITROSTAT) 0.4 mg SL tablet Place 1 tablet (0.4 mg total) under the tongue every 5 (five) minutes as needed for chest pain for up to 50 doses  Qty: 50 tablet, Refills: 0    Associated Diagnoses: Chest pain      potassium chloride (Klor-Con M20) 20 mEq tablet Take 40 mEq in the morning, 20 mEq in the afternoon, and 40 mEq in the evening  Qty: 180 tablet, Refills: 0    Associated Diagnoses: Diuretic-induced hypokalemia      pregabalin (LYRICA) 50 mg capsule Take 1 caps. at bedtime  Qty: 90 capsule, Refills: 1    Associated Diagnoses: Diabetic polyneuropathy associated with type 2 diabetes mellitus ([facility])      sitaGLIPtin (Januvia) 100 mg tablet TAKE 1/2 TABLET BY MOUTH EVERY DAY  Qty: 15 tablet, Refills: 5    Associated Diagnoses: Type 2 diabetes mellitus with stage 3b chronic kidney disease, without long-term current use of insulin ([facility])      sodium chloride (OCEAN) 0.65 % nasal spray 1 spray into each nostril every hour as needed for congestion  Qty: 37.5 mL, Refills: 0    Associated Diagnoses: Acute on chronic diastolic congestive heart failure ([facility])      spironolactone (ALDACTONE) 25 mg tablet Take 1 tablet (25 mg total) by mouth daily  Qty: 30 tablet, Refills: 0    Associated Diagnoses: Acute on chronic diastolic congestive heart failure ([facility])      tiotropium (Spiriva Respimat) 1.25 MCG/ACT AERS inhaler Inhale 2 puffs daily  Qty: 4 g, Refills: 5    Associated Diagnoses: Moderate persistent asthma with acute exacerbation           No discharge procedures on file.  Prior to Admission Medications   Prescriptions Last Dose Informant Patient Reported? Taking?   Accu-Chek FastClix Lancets MISC  Self No No   Sig: Test blood sugar twice daily   Blood Glucose  Monitoring Suppl (Accu-Chek Guide) w/Device KIT  Self No No   Sig: Use 2 (two) times a day Test blood sugar twice daily   Empagliflozin (JARDIANCE) 10 MG TABS tablet  Self No No   Sig: Take 1 tablet (10 mg total) by mouth daily   acetaminophen (TYLENOL) 325 mg tablet  Self No No   Sig: Take 2 tablets (650 mg total) by mouth every 6 (six) hours as needed for mild pain, headaches or fever   acetaminophen (TYLENOL) 650 mg CR tablet   No No   Sig: Take 1 tablet (650 mg total) by mouth every 8 (eight) hours as needed for mild pain   albuterol (PROVENTIL HFA,VENTOLIN HFA) 90 mcg/act inhaler   No No   Sig: Inhale 2 puffs every 4 (four) hours as needed for wheezing   apixaban (Eliquis) 5 mg   No No   Sig: Take 1 tablet (5 mg total) by mouth 2 (two) times a day   atorvastatin (LIPITOR) 10 mg tablet   No No   Sig: TAKE 1 TABLET BY MOUTH EVERY DAY   budesonide-formoterol (Symbicort) 160-4.5 mcg/act inhaler   No No   Sig: Inhale 2 puffs 2 (two) times a day Rinse mouth after use.   bumetanide (BUMEX) 2 mg tablet   No No   Sig: TAKE 3 TABLETS (6 MG TOTAL) BY MOUTH 3 (THREE) TIMES A DAY   cyanocobalamin (VITAMIN B-12) 1000 MCG tablet  Self No No   Sig: Take 1 tablet (1,000 mcg total) by mouth daily   famotidine (PEPCID) 20 mg tablet  Self No No   Sig: Take 1 tablet (20 mg total) by mouth if needed for heartburn As needed twice a day   fluticasone (FLONASE) 50 mcg/act nasal spray  Self No No   Si spray into each nostril 2 (two) times a day   loratadine (CLARITIN) 10 mg tablet  Self No No   Sig: Take 1 tablet (10 mg total) by mouth if needed for allergies As needed daily   metolazone (ZAROXOLYN) 2.5 mg tablet   No No   Sig: Take 2 tablets (5 mg total) by mouth 3 (three) times a week Take 20-30 minutes before Bumex dose and with additional potassium   metoprolol succinate (TOPROL-XL) 50 mg 24 hr tablet   No No   Sig: TAKE 1 TABLET BY MOUTH EVERY DAY   montelukast (SINGULAIR) 10 mg tablet  Self No No   Sig: Take 1 tablet (10 mg  "total) by mouth daily at bedtime   nitroglycerin (NITROSTAT) 0.4 mg SL tablet  Self No No   Sig: Place 1 tablet (0.4 mg total) under the tongue every 5 (five) minutes as needed for chest pain for up to 50 doses   potassium chloride (Klor-Con M20) 20 mEq tablet   No No   Sig: Take 40 mEq in the morning, 20 mEq in the afternoon, and 40 mEq in the evening   pregabalin (LYRICA) 50 mg capsule   No No   Sig: Take 1 caps. at bedtime   sitaGLIPtin (Januvia) 100 mg tablet   No No   Sig: TAKE 1/2 TABLET BY MOUTH EVERY DAY   sodium chloride (OCEAN) 0.65 % nasal spray   No No   Si spray into each nostril every hour as needed for congestion   spironolactone (ALDACTONE) 25 mg tablet   No No   Sig: Take 1 tablet (25 mg total) by mouth daily   tiotropium (Spiriva Respimat) 1.25 MCG/ACT AERS inhaler   No No   Sig: Inhale 2 puffs daily      Facility-Administered Medications: None                        Portions of the record may have been created with voice recognition software. Occasional wrong word or \"sound a like\" substitutions may have occurred due to the inherent limitations of voice recognition software. Read the chart carefully and recognize, using context, where substitutions have occurred.    Electronically signed by:  Alek Lewis  "

## 2025-03-12 NOTE — UTILIZATION REVIEW
Initial Clinical Review    Admission: Date/Time/Statement:   Admission Orders (From admission, onward)       Ordered        03/11/25 2348  INPATIENT ADMISSION  Once                          Orders Placed This Encounter   Procedures    INPATIENT ADMISSION     Standing Status:   Standing     Number of Occurrences:   1     Level of Care:   Med Surg [16]     Estimated length of stay:   More than 2 Midnights     Certification:   I certify that inpatient services are medically necessary for this patient for a duration of greater than two midnights. See H&P and MD Progress Notes for additional information about the patient's course of treatment.     ED Arrival Information       Expected   -    Arrival   3/11/2025 20:48    Acuity   Urgent              Means of arrival   Ambulance    Escorted by   [facility]    Service   Hospitalist    Admission type   Emergency              Arrival complaint   SOB             Chief Complaint   Patient presents with    Shortness of Breath     Pt c/o of SOB that started last this afternoon that has been worsening tonight. Pt has CHF and believes he is fluid overloaded. Pt gained 15 lbs in the past few days.        Initial Presentation: 59 y.o. male to ED by ems Admitted Inpatient with Acute on chronic Heart Failure.  Pt with PMHx of CHF, diabetes, A-fib presented with sob, b/l LE edema and 18 lb wt gain over the past 2-3 days. Reports compliance with his home po meds.   On presentation RR 24, O2 sat 96% on RA. Appears hypervolemic with significant b/l LE pitting edema on exam. Hgb 11.0, Hct 33.5. Calcium 7.2. Trop neg. . CXR neg for acute abnl.  Plan: Admit IP to M/S/Tele unit. Bumex 1 mg/hr gtt. Continue pta Aldactone, Toprol, Eliquis. Monitor I/Os, daily wts. Low sodium diet with fluid restriction. Monitor BMP in AM. Accu-cheks w/ ssi. SCDs while in bed, encourage OOB. Consult HF team.      Anticipated Length of Stay/Certification Statement: atient will be admitted on an  inpatient basis with an anticipated length of stay of greater than 2 midnights secondary to CHF exacerbation.     Date: 312   Day 2: pt with no complaints. Remains on RA. Continues with b/l LE edema. Continue Bumex gtt. Monitor I/Os, daily wts. Cardiac diet with fluid restriction. SCDs. OOB.      ED Treatment-Medication Administration from 03/11/2025 2048 to 03/12/2025 1125         Date/Time Order Dose Route Action     03/11/2025 2200 bumetanide (BUMEX) injection 2 mg 2 mg Intravenous Given     03/12/2025 0832 apixaban (ELIQUIS) tablet 5 mg 5 mg Oral Given     03/12/2025 0832 atorvastatin (LIPITOR) tablet 10 mg 10 mg Oral Given     03/12/2025 0833 budesonide-formoterol (SYMBICORT) 160-4.5 mcg/act inhaler 2 puff 2 puff Inhalation Given     03/12/2025 0832 cyanocobalamin (VITAMIN B-12) tablet 1,000 mcg 1,000 mcg Oral Given     03/12/2025 0833 Empagliflozin (JARDIANCE) tablet 10 mg 10 mg Oral Given     03/12/2025 0833 fluticasone (FLONASE) 50 mcg/act nasal spray 1 spray 1 spray Nasal Given     03/12/2025 0832 loratadine (CLARITIN) tablet 10 mg 10 mg Oral Given     03/12/2025 0832 metoprolol succinate (TOPROL-XL) 24 hr tablet 50 mg 50 mg Oral Given     03/12/2025 0752 insulin lispro (HumALOG/ADMELOG) 100 units/mL subcutaneous injection 1-5 Units 1 Units Subcutaneous Given     03/12/2025 0123 insulin lispro (HumALOG/ADMELOG) 100 units/mL subcutaneous injection 1-5 Units 1 Units Subcutaneous Given     03/12/2025 0115 bumetanide (BUMEX) 12.5 mg infusion 50 mL 1 mg/hr Intravenous New Bag     03/12/2025 0917 bumetanide (BUMEX) 12.5 mg infusion 50 mL 1 mg/hr Intravenous New Bag     03/12/2025 0833 spironolactone (ALDACTONE) tablet 25 mg 25 mg Oral Given     03/12/2025 0832 magnesium Oxide (MAG-OX) tablet 400 mg 400 mg Oral Given       Scheduled Medications:  apixaban, 5 mg, Oral, BID  atorvastatin, 10 mg, Oral, Daily  budesonide-formoterol, 2 puff, Inhalation, BID  cyanocobalamin, 1,000 mcg, Oral, Daily  Empagliflozin, 10 mg,  Oral, Daily  fluticasone, 1 spray, Nasal, BID  insulin lispro, 1-5 Units, Subcutaneous, TID AC  insulin lispro, 1-5 Units, Subcutaneous, HS  loratadine, 10 mg, Oral, Daily  magnesium Oxide, 400 mg, Oral, BID  metoprolol succinate, 50 mg, Oral, Daily  montelukast, 10 mg, Oral, HS  pregabalin, 50 mg, Oral, HS  spironolactone, 25 mg, Oral, Daily    Continuous IV Infusions:  bumetanide (BUMEX) 12.5 mg infusion 50 mL, 1 mg/hr, Intravenous, Continuous    PRN Meds:  acetaminophen, 650 mg, Oral, Q6H PRN  famotidine, 20 mg, Oral, BID PRN      ED Triage Vitals   Temperature Pulse Respirations Blood Pressure SpO2 Pain Score   03/11/25 2055 03/11/25 2053 03/11/25 2053 03/11/25 2053 03/11/25 2053 03/12/25 0700   98.1 °F (36.7 °C) 79 (!) 24 127/58 96 % No Pain     Weight (last 2 days)       Date/Time Weight    03/12/25 1045 150 (331.35)            Vital Signs (last 3 days)       Date/Time Temp Pulse Resp BP MAP (mmHg) SpO2 O2 Device Patient Position - Orthostatic VS Pain    03/12/25 15:26:04 97.9 °F (36.6 °C) 82 18 126/84 98 98 % -- -- --    03/12/25 12:10:11 98.2 °F (36.8 °C) 88 18 134/85 101 96 % -- -- No Pain    03/12/25 0836 -- 92 24 153/89 114 97 % None (Room air) -- --    03/12/25 0832 -- 93 -- 153/89 -- -- -- -- --    03/12/25 0700 -- -- -- -- -- -- -- -- No Pain    03/12/25 0137 -- -- -- -- -- -- None (Room air) -- --    03/12/25 0124 -- 83 22 130/70 -- 97 % None (Room air) Sitting --    03/11/25 2145 -- 72 20 106/59 79 94 % None (Room air) Lying --    03/11/25 2055 98.1 °F (36.7 °C) -- -- -- -- -- -- -- --    03/11/25 2053 -- 79 24 127/58 -- 96 % None (Room air) Lying --              Pertinent Labs/Diagnostic Test Results:   Radiology:  XR chest 2 views   Final Interpretation by Wm Vargas MD (03/12 0908)      No acute cardiopulmonary disease.        Cardiology:  ECG 12 lead   Final Result by Bobby Betancur MD (03/12 0743)   Normal sinus rhythm   Low voltage QRS   Septal infarct , age undetermined   Abnormal  ECG   When compared with ECG of 04-Jan-2025 15:51,   Septal infarct is now Present   Confirmed by Bobby Betancur ([id number]) on 3/12/2025 7:43:17 AM          Results from last 7 days   Lab Units 03/12/25  0538 03/11/25  2134   WBC Thousand/uL 5.91 5.26   HEMOGLOBIN g/dL 10.9* 11.0*   HEMATOCRIT % 33.3* 33.5*   PLATELETS Thousands/uL 204 208   TOTAL NEUT ABS Thousands/µL  --  3.12       Results from last 7 days   Lab Units 03/12/25  0538 03/11/25  2134   SODIUM mmol/L 136 136   POTASSIUM mmol/L 4.5 4.1   CHLORIDE mmol/L 100 102   CO2 mmol/L 25 22   ANION GAP mmol/L 11 12   BUN mg/dL 20 18   CREATININE mg/dL 1.32* 1.14   EGFR ml/min/1.73sq m 59 70   CALCIUM mg/dL 7.3* 7.2*   MAGNESIUM mg/dL 1.8*  --      Results from last 7 days   Lab Units 03/11/25  2134   AST U/L 10*   ALT U/L 6*   ALK PHOS U/L 83   TOTAL PROTEIN g/dL 5.8*   ALBUMIN g/dL 3.2*   TOTAL BILIRUBIN mg/dL 0.30     Results from last 7 days   Lab Units 03/12/25  1608 03/12/25  1135 03/12/25  0749 03/12/25  0114   POC GLUCOSE mg/dl 151* 157* 197* 151*     Results from last 7 days   Lab Units 03/12/25  0538 03/11/25  2134   GLUCOSE RANDOM mg/dL 168* 170*       Results from last 7 days   Lab Units 03/10/25  0906   HEMOGLOBIN A1C  6.3       Results from last 7 days   Lab Units 03/11/25  2326 03/11/25  2134   HS TNI 0HR ng/L  --  14   HS TNI 2HR ng/L 12  --    HSTNI D2 ng/L -2  --        Results from last 7 days   Lab Units 03/11/25  2134   BNP pg/mL 319*             Past Medical History:   Diagnosis Date    Abnormal stress test 06/26/2024    Acute gastritis without hemorrhage     last assessed 3/24/17    Acute on chronic diastolic heart failure (HCC) 01/04/2025    Asthma     Atrial fibrillation (HCC)     Bilateral leg edema 02/19/2020    CHF (congestive heart failure) ([facility])     Coronary artery disease     Daytime sleepiness 07/17/2019    Diabetes mellitus ([facility])     Dyspnea on exertion 10/11/2021    Edema of both feet 01/23/2020    Electrolyte abnormality 10/05/2024     Gastric bypass status for obesity     HNP (herniated nucleus pulposus), lumbar 02/23/2021    Hyperlipidemia     Hypertension     Hypokalemia 11/14/2021    Impaired fasting glucose     last assessed 5/10/17    Knee sprain, bilateral 06/14/2018    Leg cramps 06/16/2022    Obesity     Status post cholecystectomy 02/17/2024    Uncontrolled atrial flutter (HCC) 06/18/2024    Viral gastroenteritis     last assessed 11/4/16     Present on Admission:   CHF, acute on chronic (HCC)   Paroxysmal atrial fibrillation (HCC)   Type 2 diabetes mellitus without complication, without long-term current use of insulin (HCC)   Stage 3b chronic kidney disease (HCC)      Admitting Diagnosis: Shortness of breath [R06.02]  Dyspnea [R06.00]  Leg swelling [M79.89]  CHF exacerbation (HCC) [I50.9]  Elevated brain natriuretic peptide (BNP) level [R79.89]  Age/Sex: 59 y.o. male    Network Utilization Review Department  ATTENTION: Please call with any questions or concerns to [phone] and carefully listen to the prompts so that you are directed to the right person. All voicemails are confidential.   For Discharge needs, contact Care Management DC Support Team at [phone] opt. [phone]  Send all requests for admission clinical reviews, approved or denied determinations and any other requests to dedicated fax number below belonging to the campus where the patient is receiving treatment. List of dedicated fax numbers for the Facilities:  FACILITY NAME UR FAX NUMBER   ADMISSION DENIALS (Administrative/Medical Necessity) [phone]   DISCHARGE SUPPORT TEAM (NETWORK) [phone]   PARENT CHILD HEALTH (Maternity/NICU/Pediatrics) [phone]   [facility] [phone]   [facility] [phone]   [facility] [phone]   [facility] [phone]   Pending sale to [facility] [phone]   [facility] [phone]    [facility] [phone]   [facility] [phone]   [facility] [phone]   [facility] [phone]   [facility] [phone]   [facility] [phone]

## 2025-03-12 NOTE — ED PROVIDER NOTES
Time reflects when diagnosis was documented in both MDM as applicable and the Disposition within this note       Time User Action Codes Description Comment    3/11/2025 11:38 PM St, Thu T Add [R06.00] Dyspnea     3/11/2025 11:39 PM St, Thu T Add [M79.89] Leg swelling     3/11/2025 11:39 PM St, Thu T Add [I50.9] CHF exacerbation (HCC)     3/11/2025 11:39 PM St, Thu T Add [R79.89] Elevated brain natriuretic peptide (BNP) level           ED Disposition       ED Disposition   Admit    Condition   Stable    Date/Time   Tue Mar 11, 2025 11:38 PM    Comment                  Assessment & Plan       Medical Decision Making  Amount and/or Complexity of Data Reviewed  Labs: ordered. Decision-making details documented in ED Course.  Radiology: ordered.    Risk  Prescription drug management.  Decision regarding hospitalization.      Patient is a 59 y.o. male with PMH of paroxysmal atrial fibrillation on Eliquis, CHF on Bumex, hypertension, VICKY, CKD, hyperlipidemia, morbid obesity, asthma, type 2 diabetes mellitus  who presents to the ED with Barnes, weight gain, leg swelling, abdominal distension x 1 week.    Vital signs stable, afebrile. On exam 2+ pitting edema, abdominal distension. No increased work of breathing, no tachypneia.    History and physical exam most consistent with CHF exacerbation. However, differential diagnosis included but not limited to atypical ACS, pericardial effusion, pleural effusion, pneumothorax, pneumonia, arrhythmia.     Plan: CBC, BMP, troponin, BNP, EKG, chest x-ray, IV diuresis    View ED course for further discussion on patient workup.     All labs reviewed and utilized in the medical decision making process  All radiology studies independently viewed by me and interpreted by the radiologist.  I reviewed all testing with the patient.     Upon re-evaluation no urine output after 2mg bumex IV.    Disposition: Admitted to [facility] for further management of CHF exacerbation.     Portions of  "the record may have been created with voice recognition software. Occasional wrong word or \"sound a like\" substitutions may have occurred due to the inherent limitations of voice recognition software. Read the chart carefully and recognize, using context, where substitutions have occurred.    ED Course as of 03/12/25 0551   Tue Mar 11, 2025   2321 WBC: 5.26  wnl   2322 Hemoglobin(!): 11.0   2322 Platelet Count: 208  wnl   2322 BNP(!): 319   2322 Creatinine: 1.14  Wnl, was 1.85 a month ago   2338 Message sent to [facility] for admission    2348 Admitted to [facility] for CHF exacerbation       Medications   acetaminophen (TYLENOL) tablet 650 mg (has no administration in time range)   apixaban (ELIQUIS) tablet 5 mg (has no administration in time range)   atorvastatin (LIPITOR) tablet 10 mg (has no administration in time range)   budesonide-formoterol (SYMBICORT) 160-4.5 mcg/act inhaler 2 puff (has no administration in time range)   cyanocobalamin (VITAMIN B-12) tablet 1,000 mcg (has no administration in time range)   Empagliflozin (JARDIANCE) tablet 10 mg (has no administration in time range)   famotidine (PEPCID) tablet 20 mg (has no administration in time range)   fluticasone (FLONASE) 50 mcg/act nasal spray 1 spray (has no administration in time range)   loratadine (CLARITIN) tablet 10 mg (has no administration in time range)   metoprolol succinate (TOPROL-XL) 24 hr tablet 50 mg (has no administration in time range)   montelukast (SINGULAIR) tablet 10 mg (10 mg Oral Not Given 3/12/25 0517)   pregabalin (LYRICA) capsule 50 mg (has no administration in time range)   insulin lispro (HumALOG/ADMELOG) 100 units/mL subcutaneous injection 1-5 Units (has no administration in time range)   insulin lispro (HumALOG/ADMELOG) 100 units/mL subcutaneous injection 1-5 Units (1 Units Subcutaneous Given 3/12/25 0123)   bumetanide (BUMEX) 12.5 mg infusion 50 mL (1 mg/hr Intravenous New Bag 3/12/25 0115)   spironolactone (ALDACTONE) tablet 25 mg " (has no administration in time range)   bumetanide (BUMEX) injection 2 mg (2 mg Intravenous Given 3/11/25 2200)       ED Risk Strat Scores                                                History of Present Illness       Chief Complaint   Patient presents with    Shortness of Breath     Pt c/o of SOB that started last this afternoon that has been worsening tonight. Pt has CHF and believes he is fluid overloaded. Pt gained 15 lbs in the past few days.        Past Medical History:   Diagnosis Date    Abnormal stress test 06/26/2024    Acute gastritis without hemorrhage     last assessed 3/24/17    Acute on chronic diastolic heart failure (HCC) 01/04/2025    Asthma     Atrial fibrillation (HCC)     Bilateral leg edema 02/19/2020    CHF (congestive heart failure) (HCC)     Coronary artery disease     Daytime sleepiness 07/17/2019    Diabetes mellitus (HCC)     Dyspnea on exertion 10/11/2021    Edema of both feet 01/23/2020    Electrolyte abnormality 10/05/2024    Gastric bypass status for obesity     HNP (herniated nucleus pulposus), lumbar 02/23/2021    Hyperlipidemia     Hypertension     Hypokalemia 11/14/2021    Impaired fasting glucose     last assessed 5/10/17    Knee sprain, bilateral 06/14/2018    Leg cramps 06/16/2022    Obesity     Status post cholecystectomy 02/17/2024    Uncontrolled atrial flutter (HCC) 06/18/2024    Viral gastroenteritis     last assessed 11/4/16      Past Surgical History:   Procedure Laterality Date    CARDIAC CATHETERIZATION N/A 3/9/2022    Procedure: CARDIAC RHC W/ PRESSURE SENSOR;  Surgeon: Aminata Wilcox MD;  Location: [facility];  Service: Cardiology    CARDIAC CATHETERIZATION N/A 9/6/2022    Procedure: Cardiac catheterization;  Surgeon: Yolanda Del Rosario DO;  Location: [facility];  Service: Cardiology    CARDIAC CATHETERIZATION N/A 9/6/2022    Procedure: Cardiac Coronary Angiogram;  Surgeon: Yolanda Del Rosario DO;  [facility]: [facility];  Service:  Cardiology    CARDIAC CATHETERIZATION N/A 10/11/2023    Procedure: Cardiac RHC;  Surgeon: Yoel Darden MD;  Location: [facility];  Service: Cardiology    CARPAL TUNNEL RELEASE      bilateral    CHOLECYSTECTOMY LAPAROSCOPIC N/A 2/7/2024    Procedure: CHOLECYSTECTOMY LAPAROSCOPIC;  Surgeon: Nena Ferguson MD;  Location: [facility];  Service: General    GASTRIC BYPASS  2004    with han en y    HERNIA REPAIR      ventral inscisional    IR BIOPSY BONE MARROW  12/14/2023    LAPAROSCOPIC TRANSGASTRIC ACCESS FOR ERCP N/A 2/7/2024    Procedure: LAPAROSCOPIC TRANSGASTRIC ACCESS FOR ERCP;  Surgeon: Nena Ferguson MD;  Location: [facility];  Service: General    LAPAROSCOPIC TRANSGASTRIC ACCESS FOR ERCP N/A 2/7/2024    Procedure: ERCP, sphincterotomy, stone extraction;  Surgeon: Rey Ferguson MD;  Location: [facility];  Service: Gastroenterology    TONSILLECTOMY        Family History   Problem Relation Age of Onset    Stroke Mother     Hypertension Father     Cancer Father     COPD Father       Social History     Tobacco Use    Smoking status: Former     Current packs/day: 1.00     Average packs/day: 1 pack/day for 5.0 years (5.0 ttl pk-yrs)     Types: Pipe, Cigars, Cigarettes     Start date: 2016     Quit date: 1/1/2021     Passive exposure: Never    Smokeless tobacco: Former    Tobacco comments:     cigar once a day   Vaping Use    Vaping status: Never Used   Substance Use Topics    Alcohol use: Yes     Alcohol/week: 2.0 standard drinks of alcohol     Types: 2 Shots of liquor per week     Comment: social    Drug use: No      E-Cigarette/Vaping    E-Cigarette Use Never User       E-Cigarette/Vaping Substances    Nicotine No     THC No     CBD No     Flavoring No     Other No     Unknown No       I have reviewed and agree with the history as documented.     HPI  Patient is a 59 y.o. male with PMHx paroxysmal atrial fibrillation on Eliquis, CHF on Bumex, hypertension, VICKY, CKD, hyperlipidemia, morbid obesity,  asthma, type 2 diabetes mellitus who presents to the ED for evaluation of SOB.  Patient states he is having a CHF exacerbation.  Patient endorses increasing leg swelling and abdominal distention over the past week.  Endorses developing dyspnea on exertion today which prompted him to come to the ED for evaluation.  Patient reports taking Bumex 6 mg 3 times daily and also metolazone 3 times a week.  Patient denies any changes in his medications.  Patient reports taking an extra dose of metolazone yesterday without improvement.  Denies any recent illness.  Denies any fever, chills, cough, congestion, chest pain, abdominal pain, nausea, vomiting, headache, dizziness.  Patient reports 25 admissions for CHF since 2021.  Denies any changes in his diet.    Review of Systems   Respiratory:  Positive for shortness of breath.    Cardiovascular:  Positive for leg swelling.   Gastrointestinal:  Positive for abdominal distention.           Objective       ED Triage Vitals   Temperature Pulse Blood Pressure Respirations SpO2 Patient Position - Orthostatic VS   03/11/25 2055 03/11/25 2053 03/11/25 2053 03/11/25 2053 03/11/25 2053 03/11/25 2053   98.1 °F (36.7 °C) 79 127/58 (!) 24 96 % Lying      Temp Source Heart Rate Source BP Location FiO2 (%) Pain Score    03/11/25 2055 03/11/25 2145 03/11/25 2053 -- --    Oral Monitor Right arm        Vitals      Date and Time Temp Pulse SpO2 Resp BP Pain Score FACES Pain Rating User   03/12/25 0124 -- 83 97 % 22 130/70 -- -- RG   03/11/25 2145 -- 72 94 % 20 106/59 -- --    03/11/25 2055 98.1 °F (36.7 °C) -- -- -- -- -- --    03/11/25 2053 -- 79 96 % 24 127/58 -- --             Physical Exam  Vitals and nursing note reviewed.   Constitutional:       General: He is not in acute distress.     Appearance: Normal appearance. He is well-developed. He is obese. He is not ill-appearing, toxic-appearing or diaphoretic.   HENT:      Head: Normocephalic and atraumatic.   Eyes:       Conjunctiva/sclera: Conjunctivae normal.   Cardiovascular:      Rate and Rhythm: Normal rate and regular rhythm.      Heart sounds: No murmur heard.  Pulmonary:      Effort: Pulmonary effort is normal. No respiratory distress.      Breath sounds: Normal breath sounds.   Abdominal:      Palpations: Abdomen is soft.      Tenderness: There is no abdominal tenderness.   Musculoskeletal:         General: No swelling.      Cervical back: Neck supple.      Right lower leg: Edema present.      Left lower leg: Edema present.   Skin:     General: Skin is warm and dry.      Capillary Refill: Capillary refill takes less than 2 seconds.   Neurological:      General: No focal deficit present.      Mental Status: He is alert and oriented to person, place, and time.   Psychiatric:         Mood and Affect: Mood normal.         Results Reviewed       Procedure Component Value Units Date/Time    Basic metabolic panel [[id number]] Collected: 03/12/25 0538    Lab Status: In process Specimen: Blood from Arm, Left Updated: 03/12/25 0545    Magnesium [[id number]] Collected: 03/12/25 0538    Lab Status: In process Specimen: Blood from Arm, Left Updated: 03/12/25 0545    CBC (With Platelets) [[id number]] Collected: 03/12/25 0538    Lab Status: In process Specimen: Blood from Arm, Left Updated: 03/12/25 0545    Fingerstick Glucose (POCT) [[id number]]  (Abnormal) Collected: 03/12/25 0114    Lab Status: Final result Specimen: Blood Updated: 03/12/25 0116     POC Glucose 151 mg/dl     HS Troponin I 2hr [[id number]]  (Normal) Collected: 03/11/25 2326    Lab Status: Final result Specimen: Blood from Arm, Left Updated: 03/11/25 2354     hs TnI 2hr 12 ng/L      Delta 2hr hsTnI -2 ng/L     B-Type Natriuretic Peptide(BNP) [[id number]]  (Abnormal) Collected: 03/11/25 2134    Lab Status: Final result Specimen: Blood from Arm, Left Updated: 03/11/25 2209      pg/mL     Comprehensive metabolic panel [[id number]]  (Abnormal) Collected: 03/11/25 2134    Lab  Status: Final result Specimen: Blood from Arm, Left Updated: 03/11/25 2204     Sodium 136 mmol/L      Potassium 4.1 mmol/L      Chloride 102 mmol/L      CO2 22 mmol/L      ANION GAP 12 mmol/L      BUN 18 mg/dL      Creatinine 1.14 mg/dL      Glucose 170 mg/dL      Calcium 7.2 mg/dL      Corrected Calcium 7.8 mg/dL      AST 10 U/L      ALT 6 U/L      Alkaline Phosphatase 83 U/L      Total Protein 5.8 g/dL      Albumin 3.2 g/dL      Total Bilirubin 0.30 mg/dL      eGFR 70 ml/min/1.73sq m     Narrative:      [employer] guidelines for Chronic Kidney Disease (CKD):     Stage 1 with normal or high GFR (GFR > 90 mL/min/1.73 square meters)    Stage 2 Mild CKD (GFR = 60-89 mL/min/1.73 square meters)    Stage 3A Moderate CKD (GFR = 45-59 mL/min/1.73 square meters)    Stage 3B Moderate CKD (GFR = 30-44 mL/min/1.73 square meters)    Stage 4 Severe CKD (GFR = 15-29 mL/min/1.73 square meters)    Stage 5 End Stage CKD (GFR <15 mL/min/1.73 square meters)  Note: GFR calculation is accurate only with a steady state creatinine    HS Troponin 0hr (reflex protocol) [[id number]]  (Normal) Collected: 03/11/25 2134    Lab Status: Final result Specimen: Blood from Arm, Left Updated: 03/11/25 2203     hs TnI 0hr 14 ng/L     CBC and differential [[id number]]  (Abnormal) Collected: 03/11/25 2134    Lab Status: Final result Specimen: Blood from Arm, Left Updated: 03/11/25 2142     WBC 5.26 Thousand/uL      RBC 3.58 Million/uL      Hemoglobin 11.0 g/dL      Hematocrit 33.5 %      MCV 94 fL      MCH 30.7 pg      MCHC 32.8 g/dL      RDW 15.6 %      MPV 8.9 fL      Platelets 208 Thousands/uL      nRBC 0 /100 WBCs      Segmented % 60 %      Immature Grans % 1 %      Lymphocytes % 22 %      Monocytes % 10 %      Eosinophils Relative 6 %      Basophils Relative 1 %      Absolute Neutrophils 3.12 Thousands/µL      Absolute Immature Grans 0.06 Thousand/uL      Absolute Lymphocytes 1.18 Thousands/µL      Absolute Monocytes 0.52  Thousand/µL      Eosinophils Absolute 0.32 Thousand/µL      Basophils Absolute 0.06 Thousands/µL             XR chest 2 views    (Results Pending)       Procedures    ED Medication and Procedure Management   Prior to Admission Medications   Prescriptions Last Dose Informant Patient Reported? Taking?   Accu-Chek FastClix Lancets MISC  Self No No   Sig: Test blood sugar twice daily   Blood Glucose Monitoring Suppl (Accu-Chek Guide) w/Device KIT  Self No No   Sig: Use 2 (two) times a day Test blood sugar twice daily   Empagliflozin (JARDIANCE) 10 MG TABS tablet  Self No No   Sig: Take 1 tablet (10 mg total) by mouth daily   acetaminophen (TYLENOL) 325 mg tablet  Self No No   Sig: Take 2 tablets (650 mg total) by mouth every 6 (six) hours as needed for mild pain, headaches or fever   acetaminophen (TYLENOL) 650 mg CR tablet   No No   Sig: Take 1 tablet (650 mg total) by mouth every 8 (eight) hours as needed for mild pain   albuterol (PROVENTIL HFA,VENTOLIN HFA) 90 mcg/act inhaler   No No   Sig: Inhale 2 puffs every 4 (four) hours as needed for wheezing   apixaban (Eliquis) 5 mg   No No   Sig: Take 1 tablet (5 mg total) by mouth 2 (two) times a day   atorvastatin (LIPITOR) 10 mg tablet   No No   Sig: TAKE 1 TABLET BY MOUTH EVERY DAY   budesonide-formoterol (Symbicort) 160-4.5 mcg/act inhaler   No No   Sig: Inhale 2 puffs 2 (two) times a day Rinse mouth after use.   bumetanide (BUMEX) 2 mg tablet   No No   Sig: TAKE 3 TABLETS (6 MG TOTAL) BY MOUTH 3 (THREE) TIMES A DAY   cyanocobalamin (VITAMIN B-12) 1000 MCG tablet  Self No No   Sig: Take 1 tablet (1,000 mcg total) by mouth daily   famotidine (PEPCID) 20 mg tablet  Self No No   Sig: Take 1 tablet (20 mg total) by mouth if needed for heartburn As needed twice a day   fluticasone (FLONASE) 50 mcg/act nasal spray  Self No No   Si spray into each nostril 2 (two) times a day   loratadine (CLARITIN) 10 mg tablet  Self No No   Sig: Take 1 tablet (10 mg total) by mouth if  needed for allergies As needed daily   metolazone (ZAROXOLYN) 2.5 mg tablet   No No   Sig: Take 2 tablets (5 mg total) by mouth 3 (three) times a week Take 20-30 minutes before Bumex dose and with additional potassium   metoprolol succinate (TOPROL-XL) 50 mg 24 hr tablet   No No   Sig: TAKE 1 TABLET BY MOUTH EVERY DAY   montelukast (SINGULAIR) 10 mg tablet  Self No No   Sig: Take 1 tablet (10 mg total) by mouth daily at bedtime   nitroglycerin (NITROSTAT) 0.4 mg SL tablet  Self No No   Sig: Place 1 tablet (0.4 mg total) under the tongue every 5 (five) minutes as needed for chest pain for up to 50 doses   potassium chloride (Klor-Con M20) 20 mEq tablet   No No   Sig: Take 40 mEq in the morning, 20 mEq in the afternoon, and 40 mEq in the evening   pregabalin (LYRICA) 50 mg capsule   No No   Sig: Take 1 caps. at bedtime   sitaGLIPtin (Januvia) 100 mg tablet   No No   Sig: TAKE 1/2 TABLET BY MOUTH EVERY DAY   sodium chloride (OCEAN) 0.65 % nasal spray   No No   Si spray into each nostril every hour as needed for congestion   spironolactone (ALDACTONE) 25 mg tablet   No No   Sig: Take 1 tablet (25 mg total) by mouth daily   tiotropium (Spiriva Respimat) 1.25 MCG/ACT AERS inhaler   No No   Sig: Inhale 2 puffs daily      Facility-Administered Medications: None     Patient's Medications   Discharge Prescriptions    No medications on file     No discharge procedures on file.  ED SEPSIS DOCUMENTATION   Time reflects when diagnosis was documented in both MDM as applicable and the Disposition within this note       Time User Action Codes Description Comment    3/11/2025 11:38 PM Yun St Add [R06.00] Dyspnea     3/11/2025 11:39 PM Yun St Add [M79.89] Leg swelling     3/11/2025 11:39 PM Yun St Add [I50.9] CHF exacerbation (HCC)     3/11/2025 11:39 PM Yun [address] Add [R79.89] Elevated brain natriuretic peptide (BNP) level                  Yun St MD  25 0522

## 2025-03-12 NOTE — ASSESSMENT & PLAN NOTE
Wt Readings from Last 3 Encounters:   03/10/25 (!) 151 kg (333 lb 3.2 oz)   01/31/25 (!) 143 kg (315 lb)   01/09/25 (!) 143 kg (315 lb 0.6 oz)     Patient with history of HFpEF, Stage C, NYHA III  Last echo 6/2024 which showed concentric LV hypertrophy with EF 55%, normal systolic functoin. LA moderately dilated. Akinetic basal inferolateral and mid inferolateral region.   Had CardioMEMS device implanted 3/2022, most recent readings: 3/11 16 > 16> 14> 12> 15 > 15 -- of which are above goal (10-12)    Approximate 17lb weight gain over past two weeks, with elevated BNP on admission at 319. CXR without evidence of vascular congestion or pulmonary edema. Was due for f/u HF outpatient 3/6 but did not show to the appointment. Frequent admissions every 1-2 months of recent. Has been taking metolazone 5mg daily since Monday 3/10, normally takes three times per week. Received x1 Bumex 2mg IV in ED before being started on Bumex gtt via admitting team.     GDMT:  --Beta Blocker: Metoprolol succinate 50 mg daily.    --ARNi / ACEi / ARB: None currently  --Aldosterone Antagonist: Spironolactone 25mg daily  --SGLT2 Inhibitor: Jardiance 10mg daily  --Home Diuretic: Bumex 6mg TID with metolazone 5mg TIW    Plan:  Continue metoprolol 50mg daily  Continue aldactone 25mg daily  Continue jardiance 10mg daily  Currently on Bumex 1mg/hr gtt  Will monitor output through this afternoon and consider additional 5mg metoalzone later today  BMP/mag BID while on Bumex gtt

## 2025-03-12 NOTE — PROGRESS NOTES
[facility] POST ADMIT CHECK      Progress Note - Hospitalist   Name: Mesfin Cano 59 y.o. male I MRN: [id number]  Unit/Bed#: [facility] 730-01 I Date of Admission: 3/11/2025   Date of Service: 3/12/2025 I Hospital Day: 1     Assessment & Plan  CHF, acute on chronic (HCC)  Wt Readings from Last 3 Encounters:   03/12/25 (!) 150 kg (331 lb 5.6 oz)   03/10/25 (!) 151 kg (333 lb 3.2 oz)   01/31/25 (!) 143 kg (315 lb)     Presents to the ER today with reported increased swelling of the lower extremities as well as shortness of breath and weight gain of around 18 pounds up from his dry weight (weight today 333 lbs, Dry Weight 315lbs)  Appears hypervolemic with significant lower extremity pitting edema on exam  BNP elevated at 319  Chest x-ray with no large infiltrate or effusion on my read awaiting official read  History of cardiomyopathy with midrange EF in 2020 with normalized EF on most recent echo  Patient has CardioMEMS and most recent MEMs reading from 3/11 at 16 (appears his goal is 10-12)  Patient is on PTA Bumex 6 mg TID, Aldactone 25 mg daily, metolazone 2.5mg three times weekly, Jardiance 10mg daily  On previous admissions patient has required Bumex gtt of 1mg/hr started for CHF  Admit to medicine. Start Bumex 1mg/hr gtt. Given on continuous diuretic infusion monitor on telemetry. Continue Aldactone 25 mg daily. Consult heart failure team to follow along and for assistance.  Monitor I&O as well as daily weights. Monitor daily renal function.        Paroxysmal atrial fibrillation (HCC)  Diagnosed in 2022  Continue PTA Toprol as well as Eliquis  Stage 3b chronic kidney disease (HCC)  Lab Results   Component Value Date    EGFR 59 03/12/2025    EGFR 70 03/11/2025    EGFR 39 01/09/2025    CREATININE 1.32 (H) 03/12/2025    CREATININE 1.14 03/11/2025    CREATININE 1.85 (H) 01/09/2025     Creatinine today 1.14; baseline higher in the 1.5-2 range; may be lower today given patient's hypervolemic status  Monitor renal function daily  given that we are aggressively diuresing  Type 2 diabetes mellitus without complication, without long-term current use of insulin ([facility])  Lab Results   Component Value Date    HGBA1C 6.3 03/10/2025       Recent Labs     03/12/25  0114 03/12/25  0749 03/12/25  1135   POCGLU 151* 197* 157*       Blood Sugar Average: Last 72 hrs:  (P) [id number]  Sliding scale insulin while hospitalized; we are additionally continuing his Jardiance though given his CHF    [facility] Post Admit Check:     Date of visit: 03/12/25    The patient was admitted earlier to the [facility] Service.  Please see initial intake history and physical for detailed admission history.  This is a supplemental update following a post admit checkup.    Subjective: Offers no complaints    Exam: Lower extremity and abdominal edema, lungs clear, heart regular    Assessment and Plan:   See above    Mary Naik PA-C

## 2025-03-12 NOTE — ASSESSMENT & PLAN NOTE
Wt Readings from Last 3 Encounters:   03/12/25 (!) 150 kg (331 lb 5.6 oz)   03/10/25 (!) 151 kg (333 lb 3.2 oz)   01/31/25 (!) 143 kg (315 lb)     Presents to the ER today with reported increased swelling of the lower extremities as well as shortness of breath and weight gain of around 18 pounds up from his dry weight (weight today 333 lbs, Dry Weight 315lbs)  Appears hypervolemic with significant lower extremity pitting edema on exam  BNP elevated at 319  Chest x-ray with no large infiltrate or effusion on my read awaiting official read  History of cardiomyopathy with midrange EF in 2020 with normalized EF on most recent echo  Patient has CardioMEMS and most recent MEMs reading from 3/11 at 16 (appears his goal is 10-12)  Patient is on PTA Bumex 6 mg TID, Aldactone 25 mg daily, metolazone 2.5mg three times weekly, Jardiance 10mg daily  On previous admissions patient has required Bumex gtt of 1mg/hr started for CHF  Admit to medicine. Start Bumex 1mg/hr gtt. Given on continuous diuretic infusion monitor on telemetry. Continue Aldactone 25 mg daily. Consult heart failure team to follow along and for assistance.  Monitor I&O as well as daily weights. Monitor daily renal function.

## 2025-03-12 NOTE — ASSESSMENT & PLAN NOTE
Lab Results   Component Value Date    EGFR 59 03/12/2025    EGFR 70 03/11/2025    EGFR 39 01/09/2025    CREATININE 1.32 (H) 03/12/2025    CREATININE 1.14 03/11/2025    CREATININE 1.85 (H) 01/09/2025     Creatinine today 1.14; baseline higher in the 1.5-2 range; may be lower today given patient's hypervolemic status  Monitor renal function daily given that we are aggressively diuresing

## 2025-03-12 NOTE — H&P
H&P - Hospitalist   Name: Mesfin Cano 59 y.o. male I MRN: [id number]  Unit/Bed#: ED 15 I Date of Admission: 3/11/2025   Date of Service: 3/12/2025 I Hospital Day: 1     Assessment & Plan  CHF, acute on chronic (HCC)  Wt Readings from Last 3 Encounters:   03/10/25 (!) 151 kg (333 lb 3.2 oz)   01/31/25 (!) 143 kg (315 lb)   01/09/25 (!) 143 kg (315 lb 0.6 oz)     Presents to the ER today with reported increased swelling of the lower extremities as well as shortness of breath and weight gain of around 18 pounds up from his dry weight (weight today 333 lbs, Dry Weight 315lbs)  Appears hypervolemic with significant lower extremity pitting edema on exam  BNP elevated at 319  Chest x-ray with no large infiltrate or effusion on my read awaiting official read  History of cardiomyopathy with midrange EF in 2020 with normalized EF on most recent echo  Patient has CardioMEMS and most recent MEMs reading from 3/11 at 16 (appears his goal is 10-12)  Patient is on PTA Bumex 6 mg TID, Aldactone 25 mg daily, metolazone 2.5mg three times weekly, Jardiance 10mg daily  On previous admissions patient has required Bumex gtt of 1mg/hr started for CHF  Admit to medicine. Start Bumex 1mg/hr gtt. Given on continuous diuretic infusion monitor on telemetry. Continue Aldactone 25 mg daily. Consult heart failure team to follow along and for assistance.  Monitor I&O as well as daily weights. Monitor daily renal function.        Paroxysmal atrial fibrillation (HCC)  Diagnosed in 2022  Continue PTA Toprol as well as Eliquis  Stage 3b chronic kidney disease (HCC)  Lab Results   Component Value Date    EGFR 70 03/11/2025    EGFR 39 01/09/2025    EGFR 46 01/08/2025    CREATININE 1.14 03/11/2025    CREATININE 1.85 (H) 01/09/2025    CREATININE 1.61 (H) 01/08/2025     Creatinine today 1.14; baseline higher in the 1.5-2 range; may be lower today given patient's hypervolemic status  Monitor renal function daily given that we are aggressively  "diuresing  Type 2 diabetes mellitus without complication, without long-term current use of insulin ([facility])  Lab Results   Component Value Date    HGBA1C 6.3 03/10/2025       No results for input(s): \"POCGLU\" in the last 72 hours.    Blood Sugar Average: Last 72 hrs:    Sliding scale insulin while hospitalized; we are additionally continuing his Jardiance though given his CHF      VTE Pharmacologic Prophylaxis: VTE Score: 3 Moderate Risk (Score 3-4) - Pharmacological DVT Prophylaxis Ordered: apixaban (Eliquis).  Code Status: Level 1 - Full Code       Anticipated Length of Stay: Patient will be admitted on an inpatient basis with an anticipated length of stay of greater than 2 midnights secondary to CHF exacerbation.    History of Present Illness   Chief Complaint: Weight gain, lower extremity edema, shortness of breath    Mesfin Cano is a 59 y.o. male with a PMH of CHF, diabetes, A-fib who presents with reported 18 pound weight gain over the last 2 to 3 days as well as increased lower extremity edema and shortness of breath.  Patient reports symptoms feel similar to prior CHF exacerbations in the past.  Patient denies any chest pain.  Patient reports no relief despite taking his 6 mg 3 times a day of Bumex as well as metolazone at home.  Denies any chest pain.    Review of Systems   Respiratory:  Positive for shortness of breath.    Cardiovascular:  Positive for leg swelling. Negative for chest pain.   All other systems reviewed and are negative.      Historical Information   Past Medical History:   Diagnosis Date    Abnormal stress test 06/26/2024    Acute gastritis without hemorrhage     last assessed 3/24/17    Acute on chronic diastolic heart failure (HCC) 01/04/2025    Asthma     Atrial fibrillation ([facility])     Bilateral leg edema 02/19/2020    CHF (congestive heart failure) ([facility])     Coronary artery disease     Daytime sleepiness 07/17/2019    Diabetes mellitus ([facility])     Dyspnea on exertion 10/11/2021    Edema of " both feet 01/23/2020    Electrolyte abnormality 10/05/2024    Gastric bypass status for obesity     HNP (herniated nucleus pulposus), lumbar 02/23/2021    Hyperlipidemia     Hypertension     Hypokalemia 11/14/2021    Impaired fasting glucose     last assessed 5/10/17    Knee sprain, bilateral 06/14/2018    Leg cramps 06/16/2022    Obesity     Status post cholecystectomy 02/17/2024    Uncontrolled atrial flutter (HCC) 06/18/2024    Viral gastroenteritis     last assessed 11/4/16     Past Surgical History:   Procedure Laterality Date    CARDIAC CATHETERIZATION N/A 3/9/2022    Procedure: CARDIAC RHC W/ PRESSURE SENSOR;  Surgeon: Aminata Wilcox MD;  Location: [facility];  Service: Cardiology    CARDIAC CATHETERIZATION N/A 9/6/2022    Procedure: Cardiac catheterization;  Surgeon: Yolanda Del Rosario DO;  Location: [facility];  Service: Cardiology    CARDIAC CATHETERIZATION N/A 9/6/2022    Procedure: Cardiac Coronary Angiogram;  Surgeon: Yolanda Del Rosario DO;  [facility]: [facility];  Service: Cardiology    CARDIAC CATHETERIZATION N/A 10/11/2023    Procedure: Cardiac RHC;  Surgeon: Yoel Darden MD;  [facility]: [facility];  Service: Cardiology    CARPAL TUNNEL RELEASE      bilateral    CHOLECYSTECTOMY LAPAROSCOPIC N/A 2/7/2024    Procedure: CHOLECYSTECTOMY LAPAROSCOPIC;  Surgeon: Nena Ferguson MD;  Location: [facility];  Service: General    GASTRIC BYPASS  2004    with han en y    HERNIA REPAIR      ventral inscisional    IR BIOPSY BONE MARROW  12/14/2023    LAPAROSCOPIC TRANSGASTRIC ACCESS FOR ERCP N/A 2/7/2024    Procedure: LAPAROSCOPIC TRANSGASTRIC ACCESS FOR ERCP;  Surgeon: Nena Ferguson MD;  Location: [facility];  Service: General    LAPAROSCOPIC TRANSGASTRIC ACCESS FOR ERCP N/A 2/7/2024    Procedure: ERCP, sphincterotomy, stone extraction;  Surgeon: Rey Ferguson MD;  Location: [facility];  Service: Gastroenterology    TONSILLECTOMY       Social History     Tobacco  Use    Smoking status: Former     Current packs/day: 1.00     Average packs/day: 1 pack/day for 5.0 years (5.0 ttl pk-yrs)     Types: Pipe, Cigars, Cigarettes     Start date: 2016     Quit date: 1/1/2021     Passive exposure: Never    Smokeless tobacco: Former    Tobacco comments:     cigar once a day   Vaping Use    Vaping status: Never Used   Substance and Sexual Activity    Alcohol use: Yes     Alcohol/week: 2.0 standard drinks of alcohol     Types: 2 Shots of liquor per week     Comment: social    Drug use: No    Sexual activity: Yes     Partners: Female     Birth control/protection: None     E-Cigarette/Vaping    E-Cigarette Use Never User      E-Cigarette/Vaping Substances    Nicotine No     THC No     CBD No     Flavoring No     Other No     Unknown No      Family history non-contributory  Social History:  Marital Status: /Civil Union       Meds/Allergies   I have reviewed home medications with patient personally.  Prior to Admission medications    Medication Sig Start Date End Date Taking? Authorizing Provider   Accu-Chek FastClix Lancets MISC Test blood sugar twice daily 11/16/21   Soo Chavez MD   acetaminophen (TYLENOL) 325 mg tablet Take 2 tablets (650 mg total) by mouth every 6 (six) hours as needed for mild pain, headaches or fever 2/12/24   Paulino Brown MD   acetaminophen (TYLENOL) 650 mg CR tablet Take 1 tablet (650 mg total) by mouth every 8 (eight) hours as needed for mild pain 1/14/25   Robert Ramirez MD   albuterol (PROVENTIL HFA,VENTOLIN HFA) 90 mcg/act inhaler Inhale 2 puffs every 4 (four) hours as needed for wheezing 1/15/25   Soo Chavez MD   apixaban (Eliquis) 5 mg Take 1 tablet (5 mg total) by mouth 2 (two) times a day 1/2/25   Aminata Wilcox MD   atorvastatin (LIPITOR) 10 mg tablet TAKE 1 TABLET BY MOUTH EVERY DAY 2/11/25   Aminata Wilcox MD   Blood Glucose Monitoring Suppl (Accu-Chek Guide) w/Device KIT Use 2 (two) times a day Test blood sugar  twice daily 8/5/21   Soo Chavez MD   budesonide-formoterol (Symbicort) 160-4.5 mcg/act inhaler Inhale 2 puffs 2 (two) times a day Rinse mouth after use. 3/10/25   RORDIGUE Scott   bumetanide (BUMEX) 2 mg tablet TAKE 3 TABLETS (6 MG TOTAL) BY MOUTH 3 (THREE) TIMES A DAY 2/17/25   Aminata Wilcox MD   cyanocobalamin (VITAMIN B-12) 1000 MCG tablet Take 1 tablet (1,000 mcg total) by mouth daily 10/10/24   Travis Champion MD   Empagliflozin (JARDIANCE) 10 MG TABS tablet Take 1 tablet (10 mg total) by mouth daily 5/26/23   Aminata Wilcox MD   famotidine (PEPCID) 20 mg tablet Take 1 tablet (20 mg total) by mouth if needed for heartburn As needed twice a day 10/10/24   Travis Champion MD   fluticasone (FLONASE) 50 mcg/act nasal spray 1 spray into each nostril 2 (two) times a day 10/12/23   RODRIGUE Pickens   loratadine (CLARITIN) 10 mg tablet Take 1 tablet (10 mg total) by mouth if needed for allergies As needed daily 10/10/24   Travis Champion MD   metolazone (ZAROXOLYN) 2.5 mg tablet Take 2 tablets (5 mg total) by mouth 3 (three) times a week Take 20-30 minutes before Bumex dose and with additional potassium 11/29/24   RODRIGUE Wagner   metoprolol succinate (TOPROL-XL) 50 mg 24 hr tablet TAKE 1 TABLET BY MOUTH EVERY DAY 12/13/24   Aminata Wilcox MD   montelukast (SINGULAIR) 10 mg tablet Take 1 tablet (10 mg total) by mouth daily at bedtime 2/3/22 10/29/24  Soo Chavez MD   nitroglycerin (NITROSTAT) 0.4 mg SL tablet Place 1 tablet (0.4 mg total) under the tongue every 5 (five) minutes as needed for chest pain for up to 50 doses 6/27/24   Earl Sarkar MD   potassium chloride (Klor-Con M20) 20 mEq tablet Take 40 mEq in the morning, 20 mEq in the afternoon, and 40 mEq in the evening 1/9/25   Enedina Gray PA-C   pregabalin (LYRICA) 50 mg capsule Take 1 caps. at bedtime 3/4/25   RODRIGUE Scott   sitaGLIPtin (Januvia) 100 mg tablet TAKE 1/2 TABLET BY MOUTH EVERY DAY 1/16/25   Soo  MD Kathy   sodium chloride (OCEAN) 0.65 % nasal spray 1 spray into each nostril every hour as needed for congestion 11/29/24   RODRIGUE Wagner   spironolactone (ALDACTONE) 25 mg tablet Take 1 tablet (25 mg total) by mouth daily 11/30/24   RODRIGUE Wagner   tiotropium (Spiriva Respimat) 1.25 MCG/ACT AERS inhaler Inhale 2 puffs daily 1/16/25 2/15/25  Soo Chavez MD     Allergies   Allergen Reactions    Azithromycin Other (See Comments)     Shaky, uneasy feeling     Bactrim [Sulfamethoxazole-Trimethoprim] Other (See Comments)     shakiness       Objective :  Temp:  [98.1 °F (36.7 °C)] 98.1 °F (36.7 °C)  HR:  [72-79] 72  BP: (106-127)/(58-59) 106/59  Resp:  [20-24] 20  SpO2:  [94 %-96 %] 94 %  O2 Device: None (Room air)    Physical Exam  Vitals reviewed.   Constitutional:       Appearance: He is obese.   HENT:      Right Ear: External ear normal.      Left Ear: External ear normal.      Nose: No congestion.      Mouth/Throat:      Pharynx: Oropharynx is clear.   Eyes:      Extraocular Movements: Extraocular movements intact.      Pupils: Pupils are equal, round, and reactive to light.   Cardiovascular:      Rate and Rhythm: Normal rate.      Heart sounds: No murmur heard.  Pulmonary:      Comments: Decreased breath sounds at the bases bilaterally  Abdominal:      Tenderness: There is no abdominal tenderness.      Comments: Protuberant abdomen   Musculoskeletal:      Right lower leg: Edema present.      Left lower leg: Edema present.   Skin:     General: Skin is warm and dry.   Neurological:      General: No focal deficit present.      Mental Status: He is alert. Mental status is at baseline.                  Lab Results: I have reviewed the following results:  Results from last 7 days   Lab Units 03/11/25  2134   WBC Thousand/uL 5.26   HEMOGLOBIN g/dL 11.0*   HEMATOCRIT % 33.5*   PLATELETS Thousands/uL 208   SEGS PCT % 60   LYMPHO PCT % 22   MONO PCT % 10   EOS PCT % 6     Results from last 7  days   Lab Units 03/11/25  2134   SODIUM mmol/L 136   POTASSIUM mmol/L 4.1   CHLORIDE mmol/L 102   CO2 mmol/L 22   BUN mg/dL 18   CREATININE mg/dL 1.14   ANION GAP mmol/L 12   CALCIUM mg/dL 7.2*   ALBUMIN g/dL 3.2*   TOTAL BILIRUBIN mg/dL 0.30   ALK PHOS U/L 83   ALT U/L 6*   AST U/L 10*   GLUCOSE RANDOM mg/dL 170*             Lab Results   Component Value Date    HGBA1C 6.3 03/10/2025    HGBA1C 7.0 (H) 12/04/2024    HGBA1C 7.3 (H) 11/23/2024             Administrative Statements   I have spent a total time of 75 minutes in caring for this patient on the day of the visit/encounter including Diagnostic results, Prognosis, and Risks and benefits of tx options.    ** Please Note: This note has been constructed using a voice recognition system. **

## 2025-03-12 NOTE — ASSESSMENT & PLAN NOTE
Lab Results   Component Value Date    EGFR 70 03/11/2025    EGFR 39 01/09/2025    EGFR 46 01/08/2025    CREATININE 1.14 03/11/2025    CREATININE 1.85 (H) 01/09/2025    CREATININE 1.61 (H) 01/08/2025     Creatinine today 1.14; baseline higher in the 1.5-2 range; may be lower today given patient's hypervolemic status  Monitor renal function daily given that we are aggressively diuresing

## 2025-03-12 NOTE — ASSESSMENT & PLAN NOTE
Lab Results   Component Value Date    HGBA1C 6.3 03/10/2025       Recent Labs     03/12/25  0114 03/12/25  0749 03/12/25  1135   POCGLU 151* 197* 157*       Blood Sugar Average: Last 72 hrs:  (P) [id number]  Sliding scale insulin while hospitalized; we are additionally continuing his Jardiance though given his CHF

## 2025-03-12 NOTE — UTILIZATION REVIEW
NOTIFICATION OF INPATIENT ADMISSION   AUTHORIZATION REQUEST   SERVICING FACILITY:   [facility]  Address: [address] [place] [address]  Tax ID: [id number]  NPI: [id number] ATTENDING PROVIDER:  Attending Name and NPI#: Rachele Pyle Md [[id number]]  Address: [address] [place] [address]  Phone: [phone]   ADMISSION INFORMATION:  Place of Service: Inpatient [facility]  Place of Service Code: 21  Inpatient Admission Date/Time: 3/11/25 11:48 PM  Discharge Date/Time: No discharge date for patient encounter.  Admitting Diagnosis Code/Description:  Shortness of breath [R06.02]     UTILIZATION REVIEW CONTACT:  Bayron Candelaria Utilization   Network Utilization Review Department  Phone: [phone]  Fax: [phone]  Email: [email or web address]  Contact for approvals/pending authorizations, clinical reviews, and discharge.     PHYSICIAN ADVISORY SERVICES:  Medical Necessity Denial & Taoi-qz-Zssk Review  Phone: [phone]  Fax: [phone]  Email: [email or web address]     DISCHARGE SUPPORT TEAM:  For Patients Discharge Needs & Updates  Phone: [phone] opt. 2 Fax: [phone]  Email: [email or web address]

## 2025-03-12 NOTE — CONSULTS
Consultation - Heart Failure   Name: Mesfin Cano 59 y.o. male I MRN: [id number]  Unit/Bed#: ED 15 I Date of Admission: 3/11/2025   Date of Service: 3/12/2025 I Hospital Day: 1     Inpatient consult to Heart Failure Service  Consult performed by: Ilda Williamson DO  Consult ordered by: Justino Prasad DO      Physician Requesting Evaluation: Rachele Pyle MD   Reason for Evaluation / Principal Problem: HF exacerbation    Assessment & Plan  CHF, acute on chronic (HCC)  Wt Readings from Last 3 Encounters:   03/10/25 (!) 151 kg (333 lb 3.2 oz)   01/31/25 (!) 143 kg (315 lb)   01/09/25 (!) 143 kg (315 lb 0.6 oz)     Patient with history of HFpEF, Stage C, NYHA III  Last echo 6/2024 which showed concentric LV hypertrophy with EF 55%, normal systolic functoin. LA moderately dilated. Akinetic basal inferolateral and mid inferolateral region.   Had CardioMEMS device implanted 3/2022, most recent readings: 3/11 16 > 16> 14> 12> 15 > 15 -- of which are above goal (10-12)    Approximate 17lb weight gain over past two weeks, with elevated BNP on admission at 319. CXR without evidence of vascular congestion or pulmonary edema. Was due for f/u HF outpatient 3/6 but did not show to the appointment. Frequent admissions every 1-2 months of recent. Has been taking metolazone 5mg daily since Monday 3/10, normally takes three times per week. Received x1 Bumex 2mg IV in ED before being started on Bumex gtt via admitting team.     GDMT:  --Beta Blocker: Metoprolol succinate 50 mg daily.    --ARNi / ACEi / ARB: None currently  --Aldosterone Antagonist: Spironolactone 25mg daily  --SGLT2 Inhibitor: Jardiance 10mg daily  --Home Diuretic: Bumex 6mg TID with metolazone 5mg TIW    Plan:  Continue metoprolol 50mg daily  Continue aldactone 25mg daily  Continue jardiance 10mg daily  Currently on Bumex 1mg/hr gtt  Will monitor output through this afternoon and consider additional 5mg metoalzone later today  BMP/mag BID while on Bumex  gtt    Paroxysmal atrial fibrillation (HCC)  [id number] score of 3 (HTN, HF, DM)  BB, eliquis at home    Plan:  Continue beta blocker as above  Continue Eliquis   Stage 3b chronic kidney disease (HCC)  Lab Results   Component Value Date    EGFR 59 03/12/2025    EGFR 70 03/11/2025    EGFR 39 01/09/2025    CREATININE 1.32 (H) 03/12/2025    CREATININE 1.14 03/11/2025    CREATININE 1.85 (H) 01/09/2025     Baseline has historically been 1.5-2  Admission creatinine now 1.14, increased to 1.32 today. Still within baseline and will continue to monitor with ongoing diuresis  Type 2 diabetes mellitus without complication, without long-term current use of insulin ([facility])  Lab Results   Component Value Date    HGBA1C 6.3 03/10/2025       Recent Labs     03/12/25  0114 03/12/25  0749   POCGLU 151* 197*       Blood Sugar Average: Last 72 hrs:  (P) 174    Controlled but is risk factor. Management per primary team.       History of Present Illness   Mesfin Cano is a 59 y.o. male with PMH HFpEF with frequent exacerbation admissions, paroxysmal atrial fibrillation, obesity, DM2, CKD, asthm, HTN.     Patient reports progressive weight gain over past week, noting that dry weight is around 315lbs and he was found to be 333 lbs at doctor's office 3/10. Feels that he has also noticed pants/shoes fitting tighter with increased edema in legs. Also endorses TELLES, has not been doing consistent walking routine other than around the house but does hope to do so more with the weather getting warmer. Follows strict salt restriction and 2L FR at home. Has not deviated from this of recent. No inciting events that he can recall and has not been sick recently nor exposed to any sick contacts.     Review of Systems   Constitutional:  Positive for unexpected weight change. Negative for chills, fatigue and fever.   HENT:  Negative for congestion, ear pain and sore throat.    Eyes:  Negative for pain and visual disturbance.   Respiratory:  Positive for  shortness of breath. Negative for cough and chest tightness.    Cardiovascular:  Positive for leg swelling. Negative for chest pain and palpitations.   Gastrointestinal:  Positive for abdominal distention and abdominal pain. Negative for constipation, diarrhea, nausea and vomiting.   Genitourinary:  Negative for dysuria and hematuria.   Musculoskeletal:  Negative for arthralgias and back pain.   Skin:  Negative for color change and rash.   Neurological:  Negative for dizziness, seizures, syncope, light-headedness and headaches.   All other systems reviewed and are negative.      Objective :  Temp:  [98.1 °F (36.7 °C)] 98.1 °F (36.7 °C)  HR:  [72-93] 92  BP: (106-153)/(58-89) 153/89  Resp:  [20-24] 24  SpO2:  [94 %-97 %] 97 %  O2 Device: None (Room air)  Orthostatic Blood Pressures      Flowsheet Row Most Recent Value   Blood Pressure 153/89 filed at 03/12/2025 0836   Patient Position - Orthostatic VS Sitting filed at 03/12/2025 0124          First Weight:  There were no vitals filed for this visit.    Physical Exam  Vitals and nursing note reviewed.   Constitutional:       General: He is not in acute distress.     Appearance: He is well-developed. He is not ill-appearing.   HENT:      Head: Normocephalic and atraumatic.      Mouth/Throat:      Mouth: Mucous membranes are moist.      Pharynx: Oropharynx is clear.   Eyes:      General: No scleral icterus.     Conjunctiva/sclera: Conjunctivae normal.   Cardiovascular:      Rate and Rhythm: Normal rate and regular rhythm.      Heart sounds: Normal heart sounds. No murmur heard.  Pulmonary:      Effort: Pulmonary effort is normal. No respiratory distress.      Breath sounds: Normal breath sounds. No wheezing, rhonchi or rales.   Abdominal:      General: Bowel sounds are normal. There is distension.      Palpations: Abdomen is soft.      Tenderness: There is abdominal tenderness. There is no guarding.   Musculoskeletal:      Cervical back: Neck supple.      Right lower  leg: Edema present.      Left lower leg: Edema present.   Skin:     General: Skin is warm and dry.      Capillary Refill: Capillary refill takes less than 2 seconds.   Neurological:      Mental Status: He is alert.   Psychiatric:         Mood and Affect: Mood normal.           Lab Results: I have reviewed the following results:  Results from last 7 days   Lab Units 03/12/25  0538 03/11/25  2134   WBC Thousand/uL 5.91 5.26   HEMOGLOBIN g/dL 10.9* 11.0*   HEMATOCRIT % 33.3* 33.5*   PLATELETS Thousands/uL 204 208     Results from last 7 days   Lab Units 03/12/25  0538 03/11/25  2134   POTASSIUM mmol/L 4.5 4.1   CHLORIDE mmol/L 100 102   CO2 mmol/L 25 22   BUN mg/dL 20 18   CREATININE mg/dL 1.32* 1.14   CALCIUM mg/dL 7.3* 7.2*         Lab Results   Component Value Date    HGBA1C 6.3 03/10/2025     Lab Results   Component Value Date    TROPONINI <0.02 09/22/2021       Imaging Results Review: I reviewed radiology reports from this admission including: chest xray.  Other Study Results Review: EKG was reviewed.     VTE Prophylaxis: David Williamson DO  [facility]  Internal Medicine Residency, PGY-2

## 2025-03-12 NOTE — ASSESSMENT & PLAN NOTE
[id number] score of 3 (HTN, HF, DM)  BB, eliquis at home    Plan:  Continue beta blocker as above  Continue Eliquis

## 2025-03-12 NOTE — ED NOTES
Nightshift gave report to this RN. Care assumed at this time.     Eileen Rothman RN  03/12/25 0751

## 2025-03-12 NOTE — ASSESSMENT & PLAN NOTE
Lab Results   Component Value Date    EGFR 59 03/12/2025    EGFR 70 03/11/2025    EGFR 39 01/09/2025    CREATININE 1.32 (H) 03/12/2025    CREATININE 1.14 03/11/2025    CREATININE 1.85 (H) 01/09/2025     Baseline has historically been 1.5-2  Admission creatinine now 1.14, increased to 1.32 today. Still within baseline and will continue to monitor with ongoing diuresis

## 2025-03-12 NOTE — ASSESSMENT & PLAN NOTE
"Lab Results   Component Value Date    HGBA1C 6.3 03/10/2025       No results for input(s): \"POCGLU\" in the last 72 hours.    Blood Sugar Average: Last 72 hrs:    Sliding scale insulin while hospitalized; we are additionally continuing his Jardiance though given his CHF  "

## 2025-03-12 NOTE — ASSESSMENT & PLAN NOTE
Wt Readings from Last 3 Encounters:   03/10/25 (!) 151 kg (333 lb 3.2 oz)   01/31/25 (!) 143 kg (315 lb)   01/09/25 (!) 143 kg (315 lb 0.6 oz)     Presents to the ER today with reported increased swelling of the lower extremities as well as shortness of breath and weight gain of around 18 pounds up from his dry weight (weight today 333 lbs, Dry Weight 315lbs)  Appears hypervolemic with significant lower extremity pitting edema on exam  BNP elevated at 319  Chest x-ray with no large infiltrate or effusion on my read awaiting official read  History of cardiomyopathy with midrange EF in 2020 with normalized EF on most recent echo  Patient has CardioMEMS and most recent MEMs reading from 3/11 at 16 (appears his goal is 10-12)  Patient is on PTA Bumex 6 mg TID, Aldactone 25 mg daily, metolazone 2.5mg three times weekly, Jardiance 10mg daily  On previous admissions patient has required Bumex gtt of 1mg/hr started for CHF  Admit to medicine. Start Bumex 1mg/hr gtt. Given on continuous diuretic infusion monitor on telemetry. Continue Aldactone 25 mg daily. Consult heart failure team to follow along and for assistance.  Monitor I&O as well as daily weights. Monitor daily renal function.

## 2025-03-12 NOTE — PLAN OF CARE
Problem: PAIN - ADULT  Goal: Verbalizes/displays adequate comfort level or baseline comfort level  Description: Interventions:  - Encourage patient to monitor pain and request assistance  - Assess pain using appropriate pain scale  - Administer analgesics based on type and severity of pain and evaluate response  - Implement non-pharmacological measures as appropriate and evaluate response  - Consider cultural and social influences on pain and pain management  - Notify physician/advanced practitioner if interventions unsuccessful or patient reports new pain  Outcome: Progressing     Problem: INFECTION - ADULT  Goal: Absence or prevention of progression during hospitalization  Description: INTERVENTIONS:  - Assess and monitor for signs and symptoms of infection  - Monitor lab/diagnostic results  - Monitor all insertion sites, i.e. indwelling lines, tubes, and drains  - Monitor endotracheal if appropriate and nasal secretions for changes in amount and color  - [place] appropriate cooling/warming therapies per order  - Administer medications as ordered  - Instruct and encourage patient and family to use good hand hygiene technique  - Identify and instruct in appropriate isolation precautions for identified infection/condition  Outcome: Progressing  Goal: Absence of fever/infection during neutropenic period  Description: INTERVENTIONS:  - Monitor WBC    Outcome: Progressing     Problem: SAFETY ADULT  Goal: Patient will remain free of falls  Description: INTERVENTIONS:  - Educate patient/family on patient safety including physical limitations  - Instruct patient to call for assistance with activity   - Consult OT/PT to assist with strengthening/mobility   - Keep Call bell within reach  - Keep bed low and locked with side rails adjusted as appropriate  - Keep care items and personal belongings within reach  - Initiate and maintain comfort rounds  - Make Fall Risk Sign visible to staff  - Offer Toileting every 2 Hours,  in advance of need  - Initiate/Maintain bed alarm  - Apply yellow socks and bracelet for high fall risk patients  - Consider moving patient to room near nurses station  Outcome: Progressing  Goal: Maintain or return to baseline ADL function  Description: INTERVENTIONS:  -  Assess patient's ability to carry out ADLs; assess patient's baseline for ADL function and identify physical deficits which impact ability to perform ADLs (bathing, care of mouth/teeth, toileting, grooming, dressing, etc.)  - Assess/evaluate cause of self-care deficits   - Assess range of motion  - Assess patient's mobility; develop plan if impaired  - Assess patient's need for assistive devices and provide as appropriate  - Encourage maximum independence but intervene and supervise when necessary  - Involve family in performance of ADLs  - Assess for home care needs following discharge   - Consider OT consult to assist with ADL evaluation and planning for discharge  - Provide patient education as appropriate  Outcome: Progressing  Goal: Maintains/Returns to pre admission functional level  Description: INTERVENTIONS:  - Perform AM-PAC 6 Click Basic Mobility/ Daily Activity assessment daily.  - Set and communicate daily mobility goal to care team and patient/family/caregiver.   - Collaborate with rehabilitation services on mobility goals if consulted  - Perform Range of Motion 3 times a day.  - Reposition patient every 2 hours.  - Dangle patient 3 times a day  - Stand patient 2 times a day  - Ambulate patient 3 times a day  - Out of bed to chair 2 times a day   - Out of bed for meals 3 times a day  - Out of bed for toileting  - Record patient progress and toleration of activity level   Outcome: Progressing     Problem: DISCHARGE PLANNING  Goal: Discharge to home or other facility with appropriate resources  Description: INTERVENTIONS:  - Identify barriers to discharge w/patient and caregiver  - Arrange for needed discharge resources and  transportation as appropriate  - Identify discharge learning needs (meds, wound care, etc.)  - Arrange for interpretive services to assist at discharge as needed  - Refer to Case Management Department for coordinating discharge planning if the patient needs post-hospital services based on physician/advanced practitioner order or complex needs related to functional status, cognitive ability, or social support system  Outcome: Progressing     Problem: Knowledge Deficit  Goal: Patient/family/caregiver demonstrates understanding of disease process, treatment plan, medications, and discharge instructions  Description: Complete learning assessment and assess knowledge base.  Interventions:  - Provide teaching at level of understanding  - Provide teaching via preferred learning methods  Outcome: Progressing     Problem: CARDIOVASCULAR - ADULT  Goal: Maintains optimal cardiac output and hemodynamic stability  Description: INTERVENTIONS:  - Monitor I/O, vital signs and rhythm  - Monitor for S/S and trends of decreased cardiac output  - Administer and titrate ordered vasoactive medications to optimize hemodynamic stability  - Assess quality of pulses, skin color and temperature  - Assess for signs of decreased coronary artery perfusion  - Instruct patient to report change in severity of symptoms  Outcome: Progressing  Goal: Absence of cardiac dysrhythmias or at baseline rhythm  Description: INTERVENTIONS:  - Continuous cardiac monitoring, vital signs, obtain 12 lead EKG if ordered  - Administer antiarrhythmic and heart rate control medications as ordered  - Monitor electrolytes and administer replacement therapy as ordered  Outcome: Progressing     Problem: SKIN/TISSUE INTEGRITY - ADULT  Goal: Skin Integrity remains intact(Skin Breakdown Prevention)  Description: Assess:  -Perform Marcelo assessment every shift  -Clean and moisturize skin every shift  -Inspect skin when repositioning, toileting, and assisting with  ADLS  -Assess extremities for adequate circulation and sensation     Bed Management:  -Have minimal linens on bed & keep smooth, unwrinkled  -Change linens as needed when moist or perspiring    Toileting:  -Offer bedside commode  -Assess for incontinence    Activity:  -Mobilize patient 3 times a day  -Encourage activity and walks on unit  -Encourage or provide ROM exercises   -Turn and reposition patient every 2 Hours  -Use appropriate equipment to lift or move patient in bed  -Instruct/ Assist with weight shifting every 2 when out of bed in chair    Skin Care:  -Avoid use of baby powder, tape, friction and shearing, hot water or constrictive clothing  -Relieve pressure over bony prominences  -Do not massage red bony areas  Outcome: Progressing  Goal: Incision(s), wounds(s) or drain site(s) healing without S/S of infection  Description: INTERVENTIONS  - Assess and document dressing, incision, wound bed, drain sites and surrounding tissue  - Provide patient and family education  - Perform skin care/dressing  Outcome: Progressing  Goal: Pressure injury heals and does not worsen  Description: Interventions:  - Implement low air loss mattress or specialty surface (Criteria met)  - Apply silicone foam dressing  - Instruct/assist with weight shifting every 90 minutes when in chair   - Use special pressure reducing interventions  - Apply fecal or urinary incontinence containment device   - Perform passive or active ROM  - Turn and reposition patient & offload bony prominences every 2 hours   - Utilize friction reducing device or surface for transfers   - Consider nutrition services referral as needed  Outcome: Progressing

## 2025-03-12 NOTE — ASSESSMENT & PLAN NOTE
Lab Results   Component Value Date    HGBA1C 6.3 03/10/2025       Recent Labs     03/12/25  0114 03/12/25  0749   POCGLU 151* 197*       Blood Sugar Average: Last 72 hrs:  (P) 174    Controlled but is risk factor. Management per primary team.

## 2025-03-13 LAB
ANION GAP SERPL CALCULATED.3IONS-SCNC: 10 MMOL/L (ref 4–13)
ANION GAP SERPL CALCULATED.3IONS-SCNC: 13 MMOL/L (ref 4–13)
BUN SERPL-MCNC: 23 MG/DL (ref 5–25)
BUN SERPL-MCNC: 24 MG/DL (ref 5–25)
CALCIUM SERPL-MCNC: 7.5 MG/DL (ref 8.4–10.2)
CALCIUM SERPL-MCNC: 7.8 MG/DL (ref 8.4–10.2)
CHLORIDE SERPL-SCNC: 90 MMOL/L (ref 96–108)
CHLORIDE SERPL-SCNC: 94 MMOL/L (ref 96–108)
CO2 SERPL-SCNC: 31 MMOL/L (ref 21–32)
CO2 SERPL-SCNC: 31 MMOL/L (ref 21–32)
CREAT SERPL-MCNC: 1.57 MG/DL (ref 0.6–1.3)
CREAT SERPL-MCNC: 1.86 MG/DL (ref 0.6–1.3)
GFR SERPL CREATININE-BSD FRML MDRD: 39 ML/MIN/1.73SQ M
GFR SERPL CREATININE-BSD FRML MDRD: 48 ML/MIN/1.73SQ M
GLUCOSE SERPL-MCNC: 141 MG/DL (ref 65–140)
GLUCOSE SERPL-MCNC: 148 MG/DL (ref 65–140)
GLUCOSE SERPL-MCNC: 154 MG/DL (ref 65–140)
GLUCOSE SERPL-MCNC: 191 MG/DL (ref 65–140)
GLUCOSE SERPL-MCNC: 196 MG/DL (ref 65–140)
GLUCOSE SERPL-MCNC: 211 MG/DL (ref 65–140)
GLUCOSE SERPL-MCNC: 239 MG/DL (ref 65–140)
MAGNESIUM SERPL-MCNC: 2.1 MG/DL (ref 1.9–2.7)
MAGNESIUM SERPL-MCNC: 2.4 MG/DL (ref 1.9–2.7)
POTASSIUM SERPL-SCNC: 3.4 MMOL/L (ref 3.5–5.3)
POTASSIUM SERPL-SCNC: 3.6 MMOL/L (ref 3.5–5.3)
SODIUM SERPL-SCNC: 134 MMOL/L (ref 135–147)
SODIUM SERPL-SCNC: 135 MMOL/L (ref 135–147)

## 2025-03-13 PROCEDURE — 80048 BASIC METABOLIC PNL TOTAL CA: CPT | Performed by: PHYSICIAN ASSISTANT

## 2025-03-13 PROCEDURE — 83735 ASSAY OF MAGNESIUM: CPT | Performed by: PHYSICIAN ASSISTANT

## 2025-03-13 PROCEDURE — 83735 ASSAY OF MAGNESIUM: CPT | Performed by: INTERNAL MEDICINE

## 2025-03-13 PROCEDURE — 82948 REAGENT STRIP/BLOOD GLUCOSE: CPT

## 2025-03-13 PROCEDURE — 80048 BASIC METABOLIC PNL TOTAL CA: CPT | Performed by: INTERNAL MEDICINE

## 2025-03-13 PROCEDURE — 99232 SBSQ HOSP IP/OBS MODERATE 35: CPT | Performed by: INTERNAL MEDICINE

## 2025-03-13 RX ORDER — NYSTATIN 100000 [USP'U]/G
POWDER TOPICAL 2 TIMES DAILY
Status: DISCONTINUED | OUTPATIENT
Start: 2025-03-13 | End: 2025-03-20 | Stop reason: HOSPADM

## 2025-03-13 RX ORDER — MAGNESIUM SULFATE HEPTAHYDRATE 40 MG/ML
2 INJECTION, SOLUTION INTRAVENOUS ONCE
Status: COMPLETED | OUTPATIENT
Start: 2025-03-13 | End: 2025-03-13

## 2025-03-13 RX ORDER — POTASSIUM CHLORIDE 1500 MG/1
40 TABLET, EXTENDED RELEASE ORAL DAILY
Status: DISCONTINUED | OUTPATIENT
Start: 2025-03-13 | End: 2025-03-13

## 2025-03-13 RX ORDER — METOLAZONE 2.5 MG/1
2.5 TABLET ORAL DAILY
Status: DISCONTINUED | OUTPATIENT
Start: 2025-03-13 | End: 2025-03-14

## 2025-03-13 RX ORDER — POTASSIUM CHLORIDE 1500 MG/1
40 TABLET, EXTENDED RELEASE ORAL 2 TIMES DAILY
Status: DISCONTINUED | OUTPATIENT
Start: 2025-03-13 | End: 2025-03-14

## 2025-03-13 RX ADMIN — MAGNESIUM OXIDE TAB 400 MG (241.3 MG ELEMENTAL MG) 400 MG: 400 (241.3 MG) TAB at 17:53

## 2025-03-13 RX ADMIN — APIXABAN 5 MG: 5 TABLET, FILM COATED ORAL at 17:53

## 2025-03-13 RX ADMIN — ATORVASTATIN CALCIUM 10 MG: 10 TABLET, FILM COATED ORAL at 08:41

## 2025-03-13 RX ADMIN — LORATADINE 10 MG: 10 TABLET ORAL at 08:41

## 2025-03-13 RX ADMIN — BUDESONIDE AND FORMOTEROL FUMARATE DIHYDRATE 2 PUFF: 160; 4.5 AEROSOL RESPIRATORY (INHALATION) at 08:44

## 2025-03-13 RX ADMIN — CYANOCOBALAMIN TAB 500 MCG 1000 MCG: 500 TAB at 08:41

## 2025-03-13 RX ADMIN — EMPAGLIFLOZIN 10 MG: 10 TABLET, FILM COATED ORAL at 09:49

## 2025-03-13 RX ADMIN — METOPROLOL SUCCINATE 50 MG: 50 TABLET, EXTENDED RELEASE ORAL at 08:41

## 2025-03-13 RX ADMIN — Medication 1 MG/HR: at 23:54

## 2025-03-13 RX ADMIN — MAGNESIUM SULFATE HEPTAHYDRATE 2 G: 40 INJECTION, SOLUTION INTRAVENOUS at 09:53

## 2025-03-13 RX ADMIN — NYSTATIN: 100000 POWDER TOPICAL at 17:56

## 2025-03-13 RX ADMIN — POTASSIUM CHLORIDE 40 MEQ: 1500 TABLET, EXTENDED RELEASE ORAL at 21:02

## 2025-03-13 RX ADMIN — MAGNESIUM OXIDE TAB 400 MG (241.3 MG ELEMENTAL MG) 400 MG: 400 (241.3 MG) TAB at 08:41

## 2025-03-13 RX ADMIN — NYSTATIN 1 APPLICATION: 100000 POWDER TOPICAL at 12:49

## 2025-03-13 RX ADMIN — BUDESONIDE AND FORMOTEROL FUMARATE DIHYDRATE 2 PUFF: 160; 4.5 AEROSOL RESPIRATORY (INHALATION) at 17:53

## 2025-03-13 RX ADMIN — INSULIN LISPRO 2 UNITS: 100 INJECTION, SOLUTION INTRAVENOUS; SUBCUTANEOUS at 08:48

## 2025-03-13 RX ADMIN — SPIRONOLACTONE 25 MG: 25 TABLET, FILM COATED ORAL at 08:41

## 2025-03-13 RX ADMIN — Medication 1 MG/HR: at 01:37

## 2025-03-13 RX ADMIN — APIXABAN 5 MG: 5 TABLET, FILM COATED ORAL at 08:41

## 2025-03-13 RX ADMIN — METOLAZONE 2.5 MG: 2.5 TABLET ORAL at 14:16

## 2025-03-13 RX ADMIN — INSULIN LISPRO 1 UNITS: 100 INJECTION, SOLUTION INTRAVENOUS; SUBCUTANEOUS at 12:08

## 2025-03-13 RX ADMIN — INSULIN LISPRO 1 UNITS: 100 INJECTION, SOLUTION INTRAVENOUS; SUBCUTANEOUS at 21:47

## 2025-03-13 RX ADMIN — FLUTICASONE PROPIONATE 1 SPRAY: 50 SPRAY, METERED NASAL at 08:44

## 2025-03-13 RX ADMIN — PREGABALIN 50 MG: 50 CAPSULE ORAL at 21:02

## 2025-03-13 RX ADMIN — Medication 1 MG/HR: at 13:01

## 2025-03-13 RX ADMIN — FLUTICASONE PROPIONATE 1 SPRAY: 50 SPRAY, METERED NASAL at 17:53

## 2025-03-13 RX ADMIN — POTASSIUM CHLORIDE 40 MEQ: 1500 TABLET, EXTENDED RELEASE ORAL at 08:41

## 2025-03-13 RX ADMIN — MONTELUKAST 10 MG: 10 TABLET, FILM COATED ORAL at 21:02

## 2025-03-13 NOTE — ASSESSMENT & PLAN NOTE
Lab Results   Component Value Date    HGBA1C 6.3 03/10/2025       Recent Labs     03/12/25  1608 03/12/25  2121 03/13/25  0616 03/13/25  0813   POCGLU 151* 164* 154* 239*       Blood Sugar Average: Last 72 hrs:  (P) 173.4480634609238132  Sliding scale insulin while hospitalized; we are additionally continuing his Jardiance though given his CHF

## 2025-03-13 NOTE — PLAN OF CARE
Problem: PAIN - ADULT  Goal: Verbalizes/displays adequate comfort level or baseline comfort level  Description: Interventions:  - Encourage patient to monitor pain and request assistance  - Assess pain using appropriate pain scale  - Administer analgesics based on type and severity of pain and evaluate response  - Implement non-pharmacological measures as appropriate and evaluate response  - Consider cultural and social influences on pain and pain management  - Notify physician/advanced practitioner if interventions unsuccessful or patient reports new pain  Outcome: Progressing     Problem: INFECTION - ADULT  Goal: Absence or prevention of progression during hospitalization  Description: INTERVENTIONS:  - Assess and monitor for signs and symptoms of infection  - Monitor lab/diagnostic results  - Monitor all insertion sites, i.e. indwelling lines, tubes, and drains  - Monitor endotracheal if appropriate and nasal secretions for changes in amount and color  - [place] appropriate cooling/warming therapies per order  - Administer medications as ordered  - Instruct and encourage patient and family to use good hand hygiene technique  - Identify and instruct in appropriate isolation precautions for identified infection/condition  Outcome: Progressing     Problem: SAFETY ADULT  Goal: Patient will remain free of falls  Description: INTERVENTIONS:  - Educate patient/family on patient safety including physical limitations  - Instruct patient to call for assistance with activity   - Consult OT/PT to assist with strengthening/mobility   - Keep Call bell within reach  - Keep bed low and locked with side rails adjusted as appropriate  - Keep care items and personal belongings within reach  - Initiate and maintain comfort rounds  - Make Fall Risk Sign visible to staff  - Offer Toileting every 2 Hours, in advance of need  - Initiate/Maintain bed alarm  - Apply yellow socks and bracelet for high fall risk patients  - Consider  moving patient to room near nurses station  Outcome: Progressing     Problem: DISCHARGE PLANNING  Goal: Discharge to home or other facility with appropriate resources  Description: INTERVENTIONS:  - Identify barriers to discharge w/patient and caregiver  - Arrange for needed discharge resources and transportation as appropriate  - Identify discharge learning needs (meds, wound care, etc.)  - Arrange for interpretive services to assist at discharge as needed  - Refer to Case Management Department for coordinating discharge planning if the patient needs post-hospital services based on physician/advanced practitioner order or complex needs related to functional status, cognitive ability, or social support system  Outcome: Progressing     Problem: Knowledge Deficit  Goal: Patient/family/caregiver demonstrates understanding of disease process, treatment plan, medications, and discharge instructions  Description: Complete learning assessment and assess knowledge base.  Interventions:  - Provide teaching at level of understanding  - Provide teaching via preferred learning methods  Outcome: Progressing     Problem: SKIN/TISSUE INTEGRITY - ADULT  Goal: Skin Integrity remains intact(Skin Breakdown Prevention)  Description: Assess:  -Perform Marcelo assessment every shift  -Clean and moisturize skin every shift  -Inspect skin when repositioning, toileting, and assisting with ADLS  -Assess extremities for adequate circulation and sensation     Bed Management:  -Have minimal linens on bed & keep smooth, unwrinkled  -Change linens as needed when moist or perspiring    Toileting:  -Offer bedside commode  -Assess for incontinence    Activity:  -Mobilize patient 3 times a day  -Encourage activity and walks on unit  -Encourage or provide ROM exercises   -Turn and reposition patient every 2 Hours  -Use appropriate equipment to lift or move patient in bed  -Instruct/ Assist with weight shifting every 2 when out of bed in chair    Skin  Care:  -Avoid use of baby powder, tape, friction and shearing, hot water or constrictive clothing  -Relieve pressure over bony prominences  -Do not massage red bony areas  Outcome: Progressing

## 2025-03-13 NOTE — DISCHARGE INSTR - OTHER ORDERS
Skin Care Plan:  1-Apply nystatin powder to b/l abdomen/pannus/groin folds BID per order.  2-Turn/reposition q2h or when medically stable for pressure re-distribution on skin.  3-Elevate heels to offload pressure.  4-Moisturize skin daily with skin nourishing cream  5-Ehob cushion in chair when out of bed.  6-Preventative Hydraguard to bilateral sacro-buttocks and heels BID and PRN.

## 2025-03-13 NOTE — PROGRESS NOTES
Progress Note - Hospitalist   Name: Mesfin Cano 59 y.o. male I MRN: [id number]  [facility] Date of Admission: 3/11/2025   Date of Service: 3/13/2025 I Hospital Day: 2    Assessment & Plan  CHF, acute on chronic (HCC)  Wt Readings from Last 3 Encounters:   03/13/25 (!) 146 kg (322 lb 12.1 oz)   03/10/25 (!) 151 kg (333 lb 3.2 oz)   01/31/25 (!) 143 kg (315 lb)     Presented to the ED with lower extremity swelling and shortness of breath, gained about 18 pounds from his dry weight  Home regimen is Bumex 6 mg 3 times daily  Currently on Bumex drip  Continue metoprolol, spironolactone, Jardiance  Magnesium 1.8, ordered magnesium sulfate 2 g  Potassium 3.7, start potassium chloride 40 mEq daily  Cardiology on board, appreciate recommendations  Daily weights, intake and output, fluid restriction, low-salt diet  Paroxysmal atrial fibrillation (HCC)  Continue metoprolol succinate 50 mg daily  Continue Eliquis 5 mg twice daily    Stage 3b chronic kidney disease ([facility])  Lab Results   Component Value Date    EGFR 48 03/13/2025    EGFR 49 03/12/2025    EGFR 59 03/12/2025    CREATININE 1.57 (H) 03/13/2025    CREATININE 1.53 (H) 03/12/2025    CREATININE 1.32 (H) 03/12/2025     Baseline creatinine seems to be from 1.5-2.2  Creatinine currently at baseline on Bumex drip   Continue to monitor labs   Type 2 diabetes mellitus without complication, without long-term current use of insulin ([facility])  Lab Results   Component Value Date    HGBA1C 6.3 03/10/2025       Recent Labs     03/12/25  1608 03/12/25  2121 03/13/25  0616 03/13/25  0813   POCGLU 151* 164* 154* 239*       Blood Sugar Average: Last 72 hrs:  (P) [id number]  Sliding scale insulin while hospitalized; we are additionally continuing his Jardiance though given his CHF    VTE Pharmacologic Prophylaxis: VTE Score: 3 Moderate Risk (Score 3-4) - Pharmacological DVT Prophylaxis Ordered: apixaban (Eliquis).    Mobility:   Basic Mobility Inpatient Raw Score:  24  JH-HLM Goal: 8: Walk 250 feet or more  JH-HLM Achieved: 8: Walk 250 feet ot more  JH-HLM Goal achieved. Continue to encourage appropriate mobility.    Patient Centered Rounds: I performed bedside rounds with nursing staff today.   Discussions with Specialists or Other Care Team Provider: Nursing, case management    Education and Discussions with Family / Patient: Patient declined call to .     Current Length of Stay: 2 day(s)  Current Patient Status: Inpatient   Certification Statement: The patient will continue to require additional inpatient hospital stay due to heart failure exacerbation  Discharge Plan: Anticipate discharge in 48-72 hrs to home.    Code Status: Level 1 - Full Code    Subjective   Patient was seen and evaluated bedside.  Feeling better, lower extremity swelling improving urinating a lot    Objective :  Temp:  [97.9 °F (36.6 °C)-98.4 °F (36.9 °C)] 98.3 °F (36.8 °C)  HR:  [82-89] 89  BP: (126-154)/(69-85) 126/82  Resp:  [16-18] 18  SpO2:  [94 %-98 %] 94 %  O2 Device: None (Room air)    Body mass index is 42.58 kg/m².     Input and Output Summary (last 24 hours):     Intake/Output Summary (Last 24 hours) at 3/13/2025 1054  Last data filed at 3/13/2025 0900  Gross per 24 hour   Intake 960 ml   Output 2400 ml   Net -1440 ml       Physical Exam  Constitutional:       General: He is not in acute distress.     Appearance: He is obese.   HENT:      Head: Atraumatic.   Cardiovascular:      Rate and Rhythm: Normal rate and regular rhythm.      Heart sounds: No murmur heard.  Pulmonary:      Effort: Pulmonary effort is normal. No respiratory distress.      Breath sounds: Normal breath sounds. No wheezing.   Abdominal:      General: Bowel sounds are normal. There is no distension.      Palpations: Abdomen is soft.      Tenderness: There is no abdominal tenderness.   Musculoskeletal:      Cervical back: Neck supple.      Right lower leg: Edema present.      Left lower leg: Edema present.    Skin:     General: Skin is warm and dry.   Neurological:      General: No focal deficit present.      Mental Status: He is alert.   Psychiatric:         Mood and Affect: Mood normal.           Lines/Drains:        Telemetry:  Telemetry Orders (From admission, onward)               24 Hour Telemetry Monitoring  Continuous x 24 Hours (Te[place])        Expiring   Question:  Reason for 24 Hour Telemetry  Answer:  Decompensated CHF- and any one of the following: continuous diuretic infusion or total diuretic dose >200 mg daily, associated electrolyte derangement (I.e. K < 3.0), inotropic drip (continuous infusion), hx of ventricular arrhythmia, or new EF < 35%                     Telemetry Reviewed: Normal Sinus Rhythm  Indication for Continued Telemetry Use: Acute CHF on >200 mg lasix/day or equivalent dose or with new reduced EF.                Lab Results: I have reviewed the following results:   Results from last 7 days   Lab Units 03/12/25  0538 03/11/25  2134   WBC Thousand/uL 5.91 5.26   HEMOGLOBIN g/dL 10.9* 11.0*   HEMATOCRIT % 33.3* 33.5*   PLATELETS Thousands/uL 204 208   SEGS PCT %  --  60   LYMPHO PCT %  --  22   MONO PCT %  --  10   EOS PCT %  --  6     Results from last 7 days   Lab Units 03/13/25  0601 03/12/25  0538 03/11/25  2134   SODIUM mmol/L 135   < > 136   POTASSIUM mmol/L 3.6   < > 4.1   CHLORIDE mmol/L 94*   < > 102   CO2 mmol/L 31   < > 22   BUN mg/dL 23   < > 18   CREATININE mg/dL 1.57*   < > 1.14   ANION GAP mmol/L 10   < > 12   CALCIUM mg/dL 7.5*   < > 7.2*   ALBUMIN g/dL  --   --  3.2*   TOTAL BILIRUBIN mg/dL  --   --  0.30   ALK PHOS U/L  --   --  83   ALT U/L  --   --  6*   AST U/L  --   --  10*   GLUCOSE RANDOM mg/dL 148*   < > 170*    < > = values in this interval not displayed.         Results from last 7 days   Lab Units 03/13/25  0813 03/13/25  0616 03/12/25  2121 03/12/25  1608 03/12/25  1135 03/12/25  0749 03/12/25  0114   POC GLUCOSE mg/dl 239* 154* 164* 151* 157* 197* 151*      Results from last 7 days   Lab Units 03/10/25  0906   HEMOGLOBIN A1C  6.3           Recent Cultures (last 7 days):         Imaging Results Review: I reviewed radiology reports from this admission including: chest xray.  Other Study Results Review: No additional pertinent studies reviewed.    Last 24 Hours Medication List:     Current Facility-Administered Medications:     acetaminophen (TYLENOL) tablet 650 mg, Q6H PRN    apixaban (ELIQUIS) tablet 5 mg, BID    atorvastatin (LIPITOR) tablet 10 mg, Daily    budesonide-formoterol (SYMBICORT) 160-4.5 mcg/act inhaler 2 puff, BID    bumetanide (BUMEX) 12.5 mg infusion 50 mL, Continuous, Last Rate: 1 mg/hr (03/13/25 0137)    cyanocobalamin (VITAMIN B-12) tablet 1,000 mcg, Daily    Empagliflozin (JARDIANCE) tablet 10 mg, Daily    famotidine (PEPCID) tablet 20 mg, BID PRN    fluticasone (FLONASE) 50 mcg/act nasal spray 1 spray, BID    insulin lispro (HumALOG/ADMELOG) 100 units/mL subcutaneous injection 1-5 Units, TID AC **AND** Fingerstick Glucose (POCT), TID AC    insulin lispro (HumALOG/ADMELOG) 100 units/mL subcutaneous injection 1-5 Units, HS    loratadine (CLARITIN) tablet 10 mg, Daily    magnesium Oxide (MAG-OX) tablet 400 mg, BID    magnesium sulfate 2 g/50 mL IVPB (premix) 2 g, Once, Last Rate: 2 g (03/13/25 0953)    metoprolol succinate (TOPROL-XL) 24 hr tablet 50 mg, Daily    montelukast (SINGULAIR) tablet 10 mg, HS    nystatin (MYCOSTATIN) powder, BID    potassium chloride (Klor-Con M20) CR tablet 40 mEq, Daily    pregabalin (LYRICA) capsule 50 mg, HS    spironolactone (ALDACTONE) tablet 25 mg, Daily    Administrative Statements   Today, Patient Was Seen By: Rachele Pyle MD      **Please Note: This note may have been constructed using a voice recognition system.**

## 2025-03-13 NOTE — MALNUTRITION/BMI
This medical record reflects one or more clinical indicators suggestive of morbid obesity.      BMI Findings:  Adult BMI Classifications: Morbid Obesity 40-44.9        Body mass index is 42.58 kg/m².     See Nutrition note dated 3/13/25 for additional details.  Completed nutrition assessment is viewable in the nutrition documentation.

## 2025-03-13 NOTE — CASE MANAGEMENT
Case Management Assessment & Discharge Planning Note    Patient name Mesfin Cano  Location [facility] 730/[facility] 730-01 MRN [id number]  : 1965 Date 3/13/2025       Current Admission Date: 3/11/2025  Current Admission Diagnosis:CHF, acute on chronic (HCC)   Patient Active Problem List    Diagnosis Date Noted Date Diagnosed    CHF, acute on chronic (HCC) 2025     Liver lesion, left lobe 2025     Serum total bilirubin elevated 2025     Iron deficiency 10/07/2024     Onychomycosis 10/05/2024     Moderate persistent asthma without complication 2024     Type 2 diabetes mellitus without complication, without long-term current use of insulin (HCC) 2023     Anemia due to chronic kidney disease 10/16/2023     Chronic heart failure with preserved ejection fraction (HFpEF, >= 50%) (HCC) 04/10/2023     Diabetic polyneuropathy associated with type 2 diabetes mellitus (HCC) 2022     Stage 3b chronic kidney disease (HCC) 2022     Presence of CardioMEMS HF system 2022     Paroxysmal atrial fibrillation (HCC) 2022     Foraminal stenosis of lumbar region 2021     VICKY (obstructive sleep apnea)      Hyperlipidemia 2019     Primary osteoarthritis of both knees 2018     Irritable bowel syndrome with diarrhea 2018     Primary hypertension 2018     Vitamin B12 deficiency 2016     Vitamin D deficiency 2016     Morbid obesity (HCC) 2016       LOS (days): 2  Geometric Mean LOS (GMLOS) (days):   Days to GMLOS:     OBJECTIVE:    Risk of Unplanned Readmission Score: 36.1         Current admission status: Inpatient       Preferred Pharmacy:   [id number] - [place], [place] - [address]  [facility] [place] [address]  Phone: [phone] Fax: [phone]    Primary Care Provider: RODRIGUE Hodge    Primary Insurance: BLUE CROSS  Secondary Insurance:     ASSESSMENT:  Active Health Care Proxies       Karina Cano  Health Care Representative - Spouse   Primary Phone: [phone] (Home)  Mobile Phone: [phone]                           Readmission Root Cause  30 Day Readmission: No    Patient Information  Admitted from:: Home  Mental Status: Alert  During Assessment patient was accompanied by: Not accompanied during assessment  Assessment information provided by:: Patient  Primary Caregiver: Self  Support Systems: Self, Spouse/significant other, Children  County of Residence: [place]  What [facility] do you live in?: [place]  Living Arrangements: Lives w/ Spouse/significant other, Lives w/ Children  Is patient a ?: No    Activities of Daily Living Prior to Admission  Functional Status: Independent  Completes ADLs independently?: Yes  Ambulates independently?: Yes (uses a cane PRN)  Does patient use assisted devices?: Yes (as needed)  Assisted Devices (DME) used: Straight Cane  Does patient currently own DME?: Yes  What DME does the patient currently own?: Straight Cane  Does patient have a history of Outpatient Therapy (PT/OT)?: No  Does the patient have a history of Short-Term Rehab?: No  Does patient have a history of HHC?: No  Does patient currently have HHC?: No         Patient Information Continued  Income Source: Unemployed (applied for disability)  Does patient have prescription coverage?: Yes  Can the patient afford their medications and any related supplies (such as glucometers or test strips)?: Yes  Does patient receive dialysis treatments?: No  Does patient have a history of substance abuse?: No  Does patient have a history of Mental Health Diagnosis?: No         Means of Transportation  Means of Transport to [facility]:: Family transport          DISCHARGE DETAILS:    Discharge planning discussed with:: bedside with pt  Freedom of Choice: Yes  Comments - Freedom of Choice: Discussed VNA as a CHF pt. Pt refused VNA  CM contacted family/caregiver?: No- see comments (pt is A&O)             Contacts  Patient  Contacts: Karina Cano  Relationship to Patient:: Family (spouse)  Contact Method: Phone  Phone Number: [phone]  Reason/Outcome: Emergency Contact, Discharge Planning    Requested Home Health Care         Is the patient interested in HHC at discharge?: No    DME Referral Provided  Referral made for DME?: No     Additional Comments: Discussed VNA referral sice CHF pt. Pt is refusing VNA at this time. His PCP has him keeping a BP log and he has a CardioMEMS and most recent MEMs reading from 3/11 at 16 (appears his goal is 10-12). At this time pt has no aftercare needs

## 2025-03-13 NOTE — RESTORATIVE TECHNICIAN NOTE
Restorative Technician Note      Patient Name: Mesfin Cano     Note Type: Mobility  Patient Position Upon Consult: Supine  Activity Performed: Ambulated; Dangled; Stood  Assistive Device: Other (Comment) (none)  Education Provided: Yes  Patient Position at End of Consult: Supine; All needs within reach; Bed/Chair alarm activated    Kenneth VERGARA, Restorative Technician,

## 2025-03-13 NOTE — WOUND OSTOMY CARE
Consult Note - Wound   Mesfin Cano 59 y.o. male MRN: [id number]  Unit/Bed#: [facility] 730-01 Encounter: [id number]        Patient is a 60 yo male admitted to [facility] for treatment of CHF. Seen lying on regular bed. Min assist for turning and repositioning. Continent of bowel and bladder. Wound care consulted for groin/pannus/ sacral redness.     B/L Abdomen/Pannus Folds are intact with pink, fragile, moist fu gal rash. No skin loss noted. Nystatin powdered ordered.   - Pannus Fold  Sacro-Buttocks and heels are pink, intact and blanchable with no skin loss noted. Preventative orders placed    - Sacro-Buttocks  Right Anterior Knee-patient with history of wound to area. Area now with dry, intact pink scar tissue with no skin loss noted.       Orders listed below and wound care will sign off, call or Secure Chat Message with questions.     Skin Care Plan:  1-Apply nystatin powder to b/l abdomen/pannus/groin folds BID per order.  2-Turn/reposition q2h or when medically stable for pressure re-distribution on skin.  3-Elevate heels to offload pressure.  4-Moisturize skin daily with skin nourishing cream  5-Ehob cushion in chair when out of bed.  6-Preventative Hydraguard to bilateral sacro-buttocks and heels BID and PRN.                     Mattie Plummer RN, BSN, CWOCN

## 2025-03-13 NOTE — PLAN OF CARE
Problem: PAIN - ADULT  Goal: Verbalizes/displays adequate comfort level or baseline comfort level  Description: Interventions:  - Encourage patient to monitor pain and request assistance  - Assess pain using appropriate pain scale  - Administer analgesics based on type and severity of pain and evaluate response  - Implement non-pharmacological measures as appropriate and evaluate response  - Consider cultural and social influences on pain and pain management  - Notify physician/advanced practitioner if interventions unsuccessful or patient reports new pain  Outcome: Progressing     Problem: INFECTION - ADULT  Goal: Absence or prevention of progression during hospitalization  Description: INTERVENTIONS:  - Assess and monitor for signs and symptoms of infection  - Monitor lab/diagnostic results  - Monitor all insertion sites, i.e. indwelling lines, tubes, and drains  - Monitor endotracheal if appropriate and nasal secretions for changes in amount and color  - Kingsley appropriate cooling/warming therapies per order  - Administer medications as ordered  - Instruct and encourage patient and family to use good hand hygiene technique  - Identify and instruct in appropriate isolation precautions for identified infection/condition  Outcome: Progressing  Goal: Absence of fever/infection during neutropenic period  Description: INTERVENTIONS:  - Monitor WBC    Outcome: Progressing     Problem: SAFETY ADULT  Goal: Patient will remain free of falls  Description: INTERVENTIONS:  - Educate patient/family on patient safety including physical limitations  - Instruct patient to call for assistance with activity   - Consult OT/PT to assist with strengthening/mobility   - Keep Call bell within reach  - Keep bed low and locked with side rails adjusted as appropriate  - Keep care items and personal belongings within reach  - Initiate and maintain comfort rounds  - Make Fall Risk Sign visible to staff  - Offer Toileting every 2 Hours,  in advance of need  - Initiate/Maintain bed alarm  - Apply yellow socks and bracelet for high fall risk patients  - Consider moving patient to room near nurses station  Outcome: Progressing  Goal: Maintain or return to baseline ADL function  Description: INTERVENTIONS:  -  Assess patient's ability to carry out ADLs; assess patient's baseline for ADL function and identify physical deficits which impact ability to perform ADLs (bathing, care of mouth/teeth, toileting, grooming, dressing, etc.)  - Assess/evaluate cause of self-care deficits   - Assess range of motion  - Assess patient's mobility; develop plan if impaired  - Assess patient's need for assistive devices and provide as appropriate  - Encourage maximum independence but intervene and supervise when necessary  - Involve family in performance of ADLs  - Assess for home care needs following discharge   - Consider OT consult to assist with ADL evaluation and planning for discharge  - Provide patient education as appropriate  Outcome: Progressing  Goal: Maintains/Returns to pre admission functional level  Description: INTERVENTIONS:  - Perform AM-PAC 6 Click Basic Mobility/ Daily Activity assessment daily.  - Set and communicate daily mobility goal to care team and patient/family/caregiver.   - Collaborate with rehabilitation services on mobility goals if consulted  - Perform Range of Motion 3 times a day.  - Reposition patient every 2 hours.  - Dangle patient 3 times a day  - Stand patient 2 times a day  - Ambulate patient 3 times a day  - Out of bed to chair 2 times a day   - Out of bed for meals 3 times a day  - Out of bed for toileting  - Record patient progress and toleration of activity level   Outcome: Progressing     Problem: DISCHARGE PLANNING  Goal: Discharge to home or other facility with appropriate resources  Description: INTERVENTIONS:  - Identify barriers to discharge w/patient and caregiver  - Arrange for needed discharge resources and  transportation as appropriate  - Identify discharge learning needs (meds, wound care, etc.)  - Arrange for interpretive services to assist at discharge as needed  - Refer to Case Management Department for coordinating discharge planning if the patient needs post-hospital services based on physician/advanced practitioner order or complex needs related to functional status, cognitive ability, or social support system  Outcome: Progressing     Problem: Knowledge Deficit  Goal: Patient/family/caregiver demonstrates understanding of disease process, treatment plan, medications, and discharge instructions  Description: Complete learning assessment and assess knowledge base.  Interventions:  - Provide teaching at level of understanding  - Provide teaching via preferred learning methods  Outcome: Progressing     Problem: CARDIOVASCULAR - ADULT  Goal: Maintains optimal cardiac output and hemodynamic stability  Description: INTERVENTIONS:  - Monitor I/O, vital signs and rhythm  - Monitor for S/S and trends of decreased cardiac output  - Administer and titrate ordered vasoactive medications to optimize hemodynamic stability  - Assess quality of pulses, skin color and temperature  - Assess for signs of decreased coronary artery perfusion  - Instruct patient to report change in severity of symptoms  Outcome: Progressing  Goal: Absence of cardiac dysrhythmias or at baseline rhythm  Description: INTERVENTIONS:  - Continuous cardiac monitoring, vital signs, obtain 12 lead EKG if ordered  - Administer antiarrhythmic and heart rate control medications as ordered  - Monitor electrolytes and administer replacement therapy as ordered  Outcome: Progressing     Problem: SKIN/TISSUE INTEGRITY - ADULT  Goal: Skin Integrity remains intact(Skin Breakdown Prevention)  Description: Assess:  -Perform Marcelo assessment every shift  -Clean and moisturize skin every shift  -Inspect skin when repositioning, toileting, and assisting with  ADLS  -Assess extremities for adequate circulation and sensation     Bed Management:  -Have minimal linens on bed & keep smooth, unwrinkled  -Change linens as needed when moist or perspiring    Toileting:  -Offer bedside commode  -Assess for incontinence    Activity:  -Mobilize patient 3 times a day  -Encourage activity and walks on unit  -Encourage or provide ROM exercises   -Turn and reposition patient every 2 Hours  -Use appropriate equipment to lift or move patient in bed  -Instruct/ Assist with weight shifting every 2 when out of bed in chair    Skin Care:  -Avoid use of baby powder, tape, friction and shearing, hot water or constrictive clothing  -Relieve pressure over bony prominences  -Do not massage red bony areas  Outcome: Progressing  Goal: Incision(s), wounds(s) or drain site(s) healing without S/S of infection  Description: INTERVENTIONS  - Assess and document dressing, incision, wound bed, drain sites and surrounding tissue  - Provide patient and family education  - Perform skin care/dressing  Outcome: Progressing  Goal: Pressure injury heals and does not worsen  Description: Interventions:  - Implement low air loss mattress or specialty surface (Criteria met)  - Apply silicone foam dressing  - Instruct/assist with weight shifting every 90 minutes when in chair   - Use special pressure reducing interventions  - Apply fecal or urinary incontinence containment device   - Perform passive or active ROM  - Turn and reposition patient & offload bony prominences every 2 hours   - Utilize friction reducing device or surface for transfers   - Consider nutrition services referral as needed  Outcome: Progressing

## 2025-03-13 NOTE — ASSESSMENT & PLAN NOTE
Wt Readings from Last 3 Encounters:   03/13/25 (!) 146 kg (322 lb 12.1 oz)   03/10/25 (!) 151 kg (333 lb 3.2 oz)   01/31/25 (!) 143 kg (315 lb)     Presented to the ED with lower extremity swelling and shortness of breath, gained about 18 pounds from his dry weight  Home regimen is Bumex 6 mg 3 times daily  Currently on Bumex drip  Continue metoprolol, spironolactone, Jardiance  Magnesium 1.8, ordered magnesium sulfate 2 g  Potassium 3.7, start potassium chloride 40 mEq daily  Cardiology on board, appreciate recommendations  Daily weights, intake and output, fluid restriction, low-salt diet

## 2025-03-13 NOTE — ASSESSMENT & PLAN NOTE
Lab Results   Component Value Date    EGFR 48 03/13/2025    EGFR 49 03/12/2025    EGFR 59 03/12/2025    CREATININE 1.57 (H) 03/13/2025    CREATININE 1.53 (H) 03/12/2025    CREATININE 1.32 (H) 03/12/2025     Baseline creatinine seems to be from 1.5-2.2  Creatinine currently at baseline on Bumex drip   Continue to monitor labs

## 2025-03-14 LAB
ANION GAP SERPL CALCULATED.3IONS-SCNC: 11 MMOL/L (ref 4–13)
ANION GAP SERPL CALCULATED.3IONS-SCNC: 15 MMOL/L (ref 4–13)
BUN SERPL-MCNC: 27 MG/DL (ref 5–25)
BUN SERPL-MCNC: 31 MG/DL (ref 5–25)
CALCIUM SERPL-MCNC: 7.4 MG/DL (ref 8.4–10.2)
CALCIUM SERPL-MCNC: 8.1 MG/DL (ref 8.4–10.2)
CHLORIDE SERPL-SCNC: 83 MMOL/L (ref 96–108)
CHLORIDE SERPL-SCNC: 89 MMOL/L (ref 96–108)
CO2 SERPL-SCNC: 33 MMOL/L (ref 21–32)
CO2 SERPL-SCNC: 34 MMOL/L (ref 21–32)
CREAT SERPL-MCNC: 1.79 MG/DL (ref 0.6–1.3)
CREAT SERPL-MCNC: 2.01 MG/DL (ref 0.6–1.3)
GFR SERPL CREATININE-BSD FRML MDRD: 35 ML/MIN/1.73SQ M
GFR SERPL CREATININE-BSD FRML MDRD: 41 ML/MIN/1.73SQ M
GLUCOSE SERPL-MCNC: 165 MG/DL (ref 65–140)
GLUCOSE SERPL-MCNC: 168 MG/DL (ref 65–140)
GLUCOSE SERPL-MCNC: 198 MG/DL (ref 65–140)
GLUCOSE SERPL-MCNC: 204 MG/DL (ref 65–140)
GLUCOSE SERPL-MCNC: 205 MG/DL (ref 65–140)
GLUCOSE SERPL-MCNC: 215 MG/DL (ref 65–140)
MAGNESIUM SERPL-MCNC: 2.4 MG/DL (ref 1.9–2.7)
MAGNESIUM SERPL-MCNC: 2.5 MG/DL (ref 1.9–2.7)
POTASSIUM SERPL-SCNC: 3.2 MMOL/L (ref 3.5–5.3)
POTASSIUM SERPL-SCNC: 4 MMOL/L (ref 3.5–5.3)
SODIUM SERPL-SCNC: 131 MMOL/L (ref 135–147)
SODIUM SERPL-SCNC: 134 MMOL/L (ref 135–147)

## 2025-03-14 PROCEDURE — 99232 SBSQ HOSP IP/OBS MODERATE 35: CPT | Performed by: INTERNAL MEDICINE

## 2025-03-14 PROCEDURE — 80048 BASIC METABOLIC PNL TOTAL CA: CPT

## 2025-03-14 PROCEDURE — 99232 SBSQ HOSP IP/OBS MODERATE 35: CPT | Performed by: STUDENT IN AN ORGANIZED HEALTH CARE EDUCATION/TRAINING PROGRAM

## 2025-03-14 PROCEDURE — 83735 ASSAY OF MAGNESIUM: CPT | Performed by: INTERNAL MEDICINE

## 2025-03-14 PROCEDURE — 80048 BASIC METABOLIC PNL TOTAL CA: CPT | Performed by: INTERNAL MEDICINE

## 2025-03-14 PROCEDURE — 82948 REAGENT STRIP/BLOOD GLUCOSE: CPT

## 2025-03-14 PROCEDURE — 83735 ASSAY OF MAGNESIUM: CPT

## 2025-03-14 RX ORDER — POTASSIUM CHLORIDE 1500 MG/1
40 TABLET, EXTENDED RELEASE ORAL
Status: COMPLETED | OUTPATIENT
Start: 2025-03-14 | End: 2025-03-15

## 2025-03-14 RX ORDER — BUMETANIDE 0.25 MG/ML
0.5 INJECTION INTRAMUSCULAR; INTRAVENOUS CONTINUOUS
Status: DISCONTINUED | OUTPATIENT
Start: 2025-03-14 | End: 2025-03-17

## 2025-03-14 RX ORDER — POTASSIUM CHLORIDE 14.9 MG/ML
20 INJECTION INTRAVENOUS ONCE
Status: COMPLETED | OUTPATIENT
Start: 2025-03-14 | End: 2025-03-14

## 2025-03-14 RX ADMIN — FLUTICASONE PROPIONATE 1 SPRAY: 50 SPRAY, METERED NASAL at 08:20

## 2025-03-14 RX ADMIN — INSULIN LISPRO 1 UNITS: 100 INJECTION, SOLUTION INTRAVENOUS; SUBCUTANEOUS at 16:26

## 2025-03-14 RX ADMIN — PREGABALIN 50 MG: 50 CAPSULE ORAL at 22:25

## 2025-03-14 RX ADMIN — Medication 1 MG/HR: at 20:35

## 2025-03-14 RX ADMIN — LORATADINE 10 MG: 10 TABLET ORAL at 08:19

## 2025-03-14 RX ADMIN — BUDESONIDE AND FORMOTEROL FUMARATE DIHYDRATE 2 PUFF: 160; 4.5 AEROSOL RESPIRATORY (INHALATION) at 08:20

## 2025-03-14 RX ADMIN — NYSTATIN: 100000 POWDER TOPICAL at 17:49

## 2025-03-14 RX ADMIN — APIXABAN 5 MG: 5 TABLET, FILM COATED ORAL at 08:19

## 2025-03-14 RX ADMIN — MONTELUKAST 10 MG: 10 TABLET, FILM COATED ORAL at 22:25

## 2025-03-14 RX ADMIN — POTASSIUM CHLORIDE 40 MEQ: 1500 TABLET, EXTENDED RELEASE ORAL at 11:54

## 2025-03-14 RX ADMIN — NYSTATIN: 100000 POWDER TOPICAL at 08:21

## 2025-03-14 RX ADMIN — METOLAZONE 2.5 MG: 2.5 TABLET ORAL at 08:19

## 2025-03-14 RX ADMIN — POTASSIUM CHLORIDE 20 MEQ: 14.9 INJECTION, SOLUTION INTRAVENOUS at 14:36

## 2025-03-14 RX ADMIN — FLUTICASONE PROPIONATE 1 SPRAY: 50 SPRAY, METERED NASAL at 17:49

## 2025-03-14 RX ADMIN — ATORVASTATIN CALCIUM 10 MG: 10 TABLET, FILM COATED ORAL at 08:19

## 2025-03-14 RX ADMIN — CYANOCOBALAMIN TAB 500 MCG 1000 MCG: 500 TAB at 08:19

## 2025-03-14 RX ADMIN — EMPAGLIFLOZIN 10 MG: 10 TABLET, FILM COATED ORAL at 08:19

## 2025-03-14 RX ADMIN — Medication 1 MG/HR: at 12:50

## 2025-03-14 RX ADMIN — MAGNESIUM OXIDE TAB 400 MG (241.3 MG ELEMENTAL MG) 400 MG: 400 (241.3 MG) TAB at 08:20

## 2025-03-14 RX ADMIN — INSULIN LISPRO 1 UNITS: 100 INJECTION, SOLUTION INTRAVENOUS; SUBCUTANEOUS at 08:21

## 2025-03-14 RX ADMIN — INSULIN LISPRO 1 UNITS: 100 INJECTION, SOLUTION INTRAVENOUS; SUBCUTANEOUS at 22:25

## 2025-03-14 RX ADMIN — POTASSIUM CHLORIDE 40 MEQ: 1500 TABLET, EXTENDED RELEASE ORAL at 16:26

## 2025-03-14 RX ADMIN — METOPROLOL SUCCINATE 50 MG: 50 TABLET, EXTENDED RELEASE ORAL at 08:19

## 2025-03-14 RX ADMIN — APIXABAN 5 MG: 5 TABLET, FILM COATED ORAL at 17:49

## 2025-03-14 RX ADMIN — POTASSIUM CHLORIDE 40 MEQ: 1500 TABLET, EXTENDED RELEASE ORAL at 08:19

## 2025-03-14 RX ADMIN — BUDESONIDE AND FORMOTEROL FUMARATE DIHYDRATE 2 PUFF: 160; 4.5 AEROSOL RESPIRATORY (INHALATION) at 17:49

## 2025-03-14 RX ADMIN — SPIRONOLACTONE 25 MG: 25 TABLET, FILM COATED ORAL at 08:20

## 2025-03-14 RX ADMIN — MAGNESIUM OXIDE TAB 400 MG (241.3 MG ELEMENTAL MG) 400 MG: 400 (241.3 MG) TAB at 17:49

## 2025-03-14 RX ADMIN — INSULIN LISPRO 1 UNITS: 100 INJECTION, SOLUTION INTRAVENOUS; SUBCUTANEOUS at 11:53

## 2025-03-14 NOTE — ASSESSMENT & PLAN NOTE
Lab Results   Component Value Date    EGFR 41 03/14/2025    EGFR 39 03/13/2025    EGFR 48 03/13/2025    CREATININE 1.79 (H) 03/14/2025    CREATININE 1.86 (H) 03/13/2025    CREATININE 1.57 (H) 03/13/2025     Baseline creatinine seems to be from 1.5-2.2  Creatinine currently at baseline on Bumex drip   Continue to monitor labs

## 2025-03-14 NOTE — ASSESSMENT & PLAN NOTE
Lab Results   Component Value Date    EGFR 41 03/14/2025    EGFR 39 03/13/2025    EGFR 48 03/13/2025    CREATININE 1.79 (H) 03/14/2025    CREATININE 1.86 (H) 03/13/2025    CREATININE 1.57 (H) 03/13/2025     Baseline has historically been 1.5-2  Remains within baseline while on diuresis. Continue to monitor.

## 2025-03-14 NOTE — PROGRESS NOTES
"          Advanced Heart Failure / Pulmonary Hypertension Service Progress Note    Mesfin Cano 59 y.o. male   MRN: [id number]  Unit/Bed#: [facility] 730-01; Encounter: [id number]    Assessment:  Principal Problem:    CHF, acute on chronic (HCC)  Active Problems:    Paroxysmal atrial fibrillation (HCC)    Stage 3b chronic kidney disease (HCC)    Type 2 diabetes mellitus without complication, without long-term current use of insulin (HCC)      Subjective:   Patient seen and examined. No significant events overnight. Feeling better overall with reduced LE edema, and SOB.  States his breathing is not back to baseline and remains \"very winded\" when walking the hallways.      Objective:   Intake/ Output: 280/ 4875 (-4.5)  Weight: Admit 331--311 today.  Goal weight 315 lbs  Telemetry:     Today's Plan:  Bumex Gtt 1 mg/hr--down 20 lbs since admit and below goal weight of 315 lbs.  Kdur 40 TID.  Creat w/in BL. JVD improving.  Pt remains TELLES, LL rales. Will continue 1 more day then consider reducing dose drip tomorrow  Continue Toprol XL, Spironolactone, Jardiance. BP's stable.  Creatinine stable  Repeat BMP in a.m      Plan:  Acute on Chronic HFpEF, LVEF 55%  Last echo 6/2024 which showed concentric LV hypertrophy with EF 55%, normal systolic functoin. LA moderately dilated. Akinetic basal inferolateral and mid inferolateral region.   Had CardioMEMS device implanted 3/2022, most recent readings: 3/11 16 > 16> 14> 12> 15 > 15 -- of which are above goal (10-12)     Approximate 17 lb weight gain over past two weeks, with elevated BNP on admission at 319. CXR without evidence of vascular congestion or pulmonary edema. Was due for f/u HF outpatient 3/6 but did not show to the appointment. Frequent admissions every 1-2 months of recent. Has been taking metolazone 5mg daily since Monday 3/10, normally takes three times per week. Received x1 Bumex 2mg IV in ED before being started on Bumex gtt via admitting team.      --Home Diuretic: " "Bumex 6mg TID with metolazone 5mg TIW  --Inpt Diuretic: Bumex Gtt 1 mg/hr    Paroxysmal Atrial Fibrillation  AC Eliquis  Rate: BB    CKD3B (BL 1.5-2.0)  Remains w/in BL while on diuresis    DM2  Morbidly Obese- BMI 42.43      Vitals:   Blood pressure 141/76, pulse 84, temperature 98.1 °F (36.7 °C), resp. rate 18, height 6' 1\" (1.854 m), weight (!) 146 kg (321 lb 9.6 oz), SpO2 95%.    Body mass index is 42.43 kg/m².    I/O last 3 completed shifts:  In: 1080 [P.O.:1080]  Out: 8340 [Urine:8340]    Wt Readings from Last 10 Encounters:   25 (!) 146 kg (321 lb 9.6 oz)   03/10/25 (!) 151 kg (333 lb 3.2 oz)   25 (!) 143 kg (315 lb)   25 (!) 143 kg (315 lb 0.6 oz)   24 (!) 147 kg (323 lb 6.4 oz)   24 (!) 143 kg (314 lb 9.6 oz)   10/29/24 (!) 145 kg (319 lb)   10/10/24 (!) 145 kg (319 lb)   10/03/24 (!) 150 kg (331 lb 11.2 oz)   10/01/24 (!) 147 kg (324 lb)     Vitals:    25 0747 25 1000 25 1053 25 1253   BP: 125/77  127/72 141/76   Pulse: 92  78 84   Resp: 16  18    Temp: 98.1 °F (36.7 °C)  98.1 °F (36.7 °C)    TempSrc:       SpO2: 95% 96% 95% 95%   Weight:       Height:           Physical Exam  Constitutional:       Appearance: He is obese.   Neck:      Vascular: JVD present.      Comments: improving  Cardiovascular:      Rate and Rhythm: Normal rate and regular rhythm.      Heart sounds: Normal heart sounds, S1 normal and S2 normal.   Pulmonary:      Effort: No respiratory distress.      Breath sounds: Normal air entry. Examination of the right-upper field reveals decreased breath sounds. Examination of the left-upper field reveals decreased breath sounds. Examination of the right-lower field reveals rales. Examination of the left-lower field reveals rales. Decreased breath sounds and rales present.      Comments: Remains TELLES--breathing not at BL, but improving  Musculoskeletal:      Right lower le+ Edema present.      Left lower le+ Edema present. "   Neurological:      Mental Status: He is alert.             Current Facility-Administered Medications:     acetaminophen (TYLENOL) tablet 650 mg, 650 mg, Oral, Q6H PRN, Justino Prasad DO    apixaban (ELIQUIS) tablet 5 mg, 5 mg, Oral, BID, Justino Prasad DO, 5 mg at 03/14/25 0819    atorvastatin (LIPITOR) tablet 10 mg, 10 mg, Oral, Daily, Justino Prasad DO, 10 mg at 03/14/25 0819    budesonide-formoterol (SYMBICORT) 160-4.5 mcg/act inhaler 2 puff, 2 puff, Inhalation, BID, Justino Prasad DO, 2 puff at 03/14/25 0820    bumetanide (BUMEX) 12.5 mg infusion 50 mL, 1 mg/hr, Intravenous, Continuous, Justino Prasad DO, Last Rate: 4 mL/hr at 03/14/25 1250, 1 mg/hr at 03/14/25 1250    cyanocobalamin (VITAMIN B-12) tablet 1,000 mcg, 1,000 mcg, Oral, Daily, Justino Prasad DO, 1,000 mcg at 03/14/25 0819    Empagliflozin (JARDIANCE) tablet 10 mg, 10 mg, Oral, Daily, Justino Prasad DO, 10 mg at 03/14/25 0819    famotidine (PEPCID) tablet 20 mg, 20 mg, Oral, BID PRN, Justino Prasad DO    fluticasone (FLONASE) 50 mcg/act nasal spray 1 spray, 1 spray, Nasal, BID, Justino Prasad DO, 1 spray at 03/14/25 0820    insulin lispro (HumALOG/ADMELOG) 100 units/mL subcutaneous injection 1-5 Units, 1-5 Units, Subcutaneous, TID AC, 1 Units at 03/14/25 1153 **AND** Fingerstick Glucose (POCT), , , TID AC, Justino Prasad DO    insulin lispro (HumALOG/ADMELOG) 100 units/mL subcutaneous injection 1-5 Units, 1-5 Units, Subcutaneous, HS, Justino Prasad DO, 1 Units at 03/13/25 6487    loratadine (CLARITIN) tablet 10 mg, 10 mg, Oral, Daily, Justino Prasad DO, 10 mg at 03/14/25 0819    magnesium Oxide (MAG-OX) tablet 400 mg, 400 mg, Oral, BID, Mary Naik PA-C, 400 mg at 03/14/25 0820    metoprolol succinate (TOPROL-XL) 24 hr tablet 50 mg, 50 mg, Oral, Daily, Justino Prasad DO, 50 mg at 03/14/25 0819    montelukast (SINGULAIR) tablet 10 mg, 10 mg, Oral, HS, Justino Prasad DO, 10 mg at 03/13/25 2102    nystatin (MYCOSTATIN) powder, , Topical, BID,  Rachele Pyle MD, Given at 03/14/25 0821    potassium chloride (Klor-Con M20) CR tablet 40 mEq, 40 mEq, Oral, TID With Meals, Rachele Pyle MD, 40 mEq at 03/14/25 1154    potassium chloride 20 mEq IVPB (premix), 20 mEq, Intravenous, Once, Ilda Williamson DO, Last Rate: 50 mL/hr at 03/14/25 1436, 20 mEq at 03/14/25 1436    pregabalin (LYRICA) capsule 50 mg, 50 mg, Oral, HS, Justino Prasad DO, 50 mg at 03/13/25 2102    spironolactone (ALDACTONE) tablet 25 mg, 25 mg, Oral, Daily, Justino Prasad DO, 25 mg at 03/14/25 0820    Labs & Results:      Results from last 7 days   Lab Units 03/12/25  0538 03/11/25  2134   WBC Thousand/uL 5.91 5.26   HEMOGLOBIN g/dL 10.9* 11.0*   HEMATOCRIT % 33.3* 33.5*   PLATELETS Thousands/uL 204 208         Results from last 7 days   Lab Units 03/14/25  0531 03/13/25  1811 03/13/25  0601 03/12/25  0538 03/11/25  2134   POTASSIUM mmol/L 3.2* 3.4* 3.6   < > 4.1   CHLORIDE mmol/L 89* 90* 94*   < > 102   CO2 mmol/L 34* 31 31   < > 22   BUN mg/dL 27* 24 23   < > 18   CREATININE mg/dL 1.79* 1.86* 1.57*   < > 1.14   CALCIUM mg/dL 7.4* 7.8* 7.5*   < > 7.2*   ALK PHOS U/L  --   --   --   --  83   ALT U/L  --   --   --   --  6*   AST U/L  --   --   --   --  10*    < > = values in this interval not displayed.             Thank you for the opportunity to participate in the care of this patient.    RODRIGUE Flynn  Advanced Heart Failure  [facility]

## 2025-03-14 NOTE — NURSING NOTE
Changed telemetry box and tele leads because telemetry was alarming due to artifact during my shift , also reached out to Ilda Williamson DO to update her with the sinus bradycardia situation, however patients is alert and oriented X4.

## 2025-03-14 NOTE — PROGRESS NOTES
Progress Note - Hospitalist   Name: Mesfin Cano 59 y.o. male I MRN: [id number]  [facility] I Date of Admission: 3/11/2025   Date of Service: 3/14/2025 I Hospital Day: 3    Assessment & Plan  CHF, acute on chronic (HCC)  Wt Readings from Last 3 Encounters:   03/14/25 (!) 146 kg (321 lb 9.6 oz)   03/10/25 (!) 151 kg (333 lb 3.2 oz)   01/31/25 (!) 143 kg (315 lb)     Presented to the ED with lower extremity swelling and shortness of breath, gained about 18 pounds from his dry weight  Home regimen is Bumex 6 mg 3 times daily  Currently on Bumex drip, continue today with metolazone dose yesterday and today  Continue metoprolol, spironolactone, Jardiance  Potassium 3.2, increase potassium chloride supplements to 40 mEq 3 times daily today  Cardiology on board, appreciate recommendations  Daily weights, intake and output, fluid restriction, low-salt diet  Paroxysmal atrial fibrillation (HCC)  Continue metoprolol succinate 50 mg daily  Continue Eliquis 5 mg twice daily    Stage 3b chronic kidney disease ([facility])  Lab Results   Component Value Date    EGFR 41 03/14/2025    EGFR 39 03/13/2025    EGFR 48 03/13/2025    CREATININE 1.79 (H) 03/14/2025    CREATININE 1.86 (H) 03/13/2025    CREATININE 1.57 (H) 03/13/2025     Baseline creatinine seems to be from 1.5-2.2  Creatinine currently at baseline on Bumex drip   Continue to monitor labs   Type 2 diabetes mellitus without complication, without long-term current use of insulin ([facility])  Lab Results   Component Value Date    HGBA1C 6.3 03/10/2025       Recent Labs     03/13/25  1556 03/13/25  2135 03/14/25  0618 03/14/25  1055   POCGLU 141* 211* 168* 205*       Blood Sugar Average: Last 72 hrs:  (P) [id number]  Sliding scale insulin while hospitalized; we are additionally continuing his Jardiance though given his CHF    VTE Pharmacologic Prophylaxis: VTE Score: 3 Moderate Risk (Score 3-4) - Pharmacological DVT Prophylaxis Ordered: apixaban (Eliquis).    Mobility:    Basic Mobility Inpatient Raw Score: 24  JH-HLM Goal: 8: Walk 250 feet or more  JH-HLM Achieved: 7: Walk 25 feet or more  JH-HLM Goal achieved. Continue to encourage appropriate mobility.    Patient Centered Rounds:  discussed with nurisng    Discussions with Specialists or Other Care Team Provider: Cardiology, nursing, case management    Education and Discussions with Family / Patient: Patient declined call to .     Current Length of Stay: 3 day(s)  Current Patient Status: Inpatient   Certification Statement: The patient will continue to require additional inpatient hospital stay due to heart failure exacerbation, Bumex drip  Discharge Plan: Anticipate discharge in 48 hrs to home.    Code Status: Level 1 - Full Code    Subjective   \Patient was seen evaluated bedside.  Feeling better, lower extremity swelling improving    Objective :  Temp:  [98.1 °F (36.7 °C)-98.4 °F (36.9 °C)] 98.1 °F (36.7 °C)  HR:  [45-92] 84  BP: (112-144)/(68-90) 141/76  Resp:  [16-18] 18  SpO2:  [93 %-99 %] 95 %    Body mass index is 42.43 kg/m².     Input and Output Summary (last 24 hours):     Intake/Output Summary (Last 24 hours) at 3/14/2025 1317  Last data filed at 3/14/2025 0531  Gross per 24 hour   Intake 480 ml   Output 4450 ml   Net -3970 ml       Physical Exam  Constitutional:       General: He is not in acute distress.     Appearance: He is obese.   HENT:      Head: Atraumatic.   Cardiovascular:      Rate and Rhythm: Normal rate and regular rhythm.      Heart sounds: No murmur heard.  Pulmonary:      Effort: Pulmonary effort is normal. No respiratory distress.      Breath sounds: Normal breath sounds. No wheezing.   Abdominal:      General: Bowel sounds are normal. There is no distension.      Palpations: Abdomen is soft.      Tenderness: There is no abdominal tenderness.   Musculoskeletal:      Cervical back: Neck supple.      Right lower leg: Edema present.      Left lower leg: Edema present.   Skin:     General:  Skin is warm and dry.   Neurological:      General: No focal deficit present.      Mental Status: He is alert.   Psychiatric:         Mood and Affect: Mood normal.           Lines/Drains:        Telemetry:  Telemetry Orders (From admission, onward)               24 Hour Telemetry Monitoring  Continuous x 24 Hours (Telem)        Expiring   Question:  Reason for 24 Hour Telemetry  Answer:  Decompensated CHF- and any one of the following: continuous diuretic infusion or total diuretic dose >200 mg daily, associated electrolyte derangement (I.e. K < 3.0), inotropic drip (continuous infusion), hx of ventricular arrhythmia, or new EF < 35%                     Telemetry Reviewed: Normal Sinus Rhythm  Indication for Continued Telemetry Use: Acute CHF on >200 mg lasix/day or equivalent dose or with new reduced EF.                Lab Results: I have reviewed the following results:   Results from last 7 days   Lab Units 03/12/25  0538 03/11/25  2134   WBC Thousand/uL 5.91 5.26   HEMOGLOBIN g/dL 10.9* 11.0*   HEMATOCRIT % 33.3* 33.5*   PLATELETS Thousands/uL 204 208   SEGS PCT %  --  60   LYMPHO PCT %  --  22   MONO PCT %  --  10   EOS PCT %  --  6     Results from last 7 days   Lab Units 03/14/25  0531 03/12/25  0538 03/11/25  2134   SODIUM mmol/L 134*   < > 136   POTASSIUM mmol/L 3.2*   < > 4.1   CHLORIDE mmol/L 89*   < > 102   CO2 mmol/L 34*   < > 22   BUN mg/dL 27*   < > 18   CREATININE mg/dL 1.79*   < > 1.14   ANION GAP mmol/L 11   < > 12   CALCIUM mg/dL 7.4*   < > 7.2*   ALBUMIN g/dL  --   --  3.2*   TOTAL BILIRUBIN mg/dL  --   --  0.30   ALK PHOS U/L  --   --  83   ALT U/L  --   --  6*   AST U/L  --   --  10*   GLUCOSE RANDOM mg/dL 165*   < > 170*    < > = values in this interval not displayed.         Results from last 7 days   Lab Units 03/14/25  1055 03/14/25  0618 03/13/25  2135 03/13/25  1556 03/13/25  1055 03/13/25  0813 03/13/25  0616 03/12/25  2121 03/12/25  1608 03/12/25  1135 03/12/25  0749 03/12/25  0114   POC  GLUCOSE mg/dl 205* 168* 211* 141* 191* 239* 154* 164* 151* 157* 197* 151*     Results from last 7 days   Lab Units 03/10/25  0906   HEMOGLOBIN A1C  6.3           Recent Cultures (last 7 days):         Imaging Results Review: I reviewed radiology reports from this admission including: chest xray.  Other Study Results Review: No additional pertinent studies reviewed.    Last 24 Hours Medication List:     Current Facility-Administered Medications:     acetaminophen (TYLENOL) tablet 650 mg, Q6H PRN    apixaban (ELIQUIS) tablet 5 mg, BID    atorvastatin (LIPITOR) tablet 10 mg, Daily    budesonide-formoterol (SYMBICORT) 160-4.5 mcg/act inhaler 2 puff, BID    bumetanide (BUMEX) 12.5 mg infusion 50 mL, Continuous, Last Rate: 1 mg/hr (03/14/25 1250)    cyanocobalamin (VITAMIN B-12) tablet 1,000 mcg, Daily    Empagliflozin (JARDIANCE) tablet 10 mg, Daily    famotidine (PEPCID) tablet 20 mg, BID PRN    fluticasone (FLONASE) 50 mcg/act nasal spray 1 spray, BID    insulin lispro (HumALOG/ADMELOG) 100 units/mL subcutaneous injection 1-5 Units, TID AC **AND** Fingerstick Glucose (POCT), TID AC    insulin lispro (HumALOG/ADMELOG) 100 units/mL subcutaneous injection 1-5 Units, HS    loratadine (CLARITIN) tablet 10 mg, Daily    magnesium Oxide (MAG-OX) tablet 400 mg, BID    metoprolol succinate (TOPROL-XL) 24 hr tablet 50 mg, Daily    montelukast (SINGULAIR) tablet 10 mg, HS    nystatin (MYCOSTATIN) powder, BID    potassium chloride (Klor-Con M20) CR tablet 40 mEq, TID With Meals    potassium chloride 20 mEq IVPB (premix), Once    pregabalin (LYRICA) capsule 50 mg, HS    spironolactone (ALDACTONE) tablet 25 mg, Daily    Administrative Statements   Today, Patient Was Seen By: Rachele Pyle MD      **Please Note: This note may have been constructed using a voice recognition system.**

## 2025-03-14 NOTE — ASSESSMENT & PLAN NOTE
[id number] score of 3 (HTN, HF, DM)  BB, eliquis at home    Plan:  Continue beta blocker as above  Continue Eliquis

## 2025-03-14 NOTE — PLAN OF CARE
Problem: PAIN - ADULT  Goal: Verbalizes/displays adequate comfort level or baseline comfort level  Description: Interventions:  - Encourage patient to monitor pain and request assistance  - Assess pain using appropriate pain scale  - Administer analgesics based on type and severity of pain and evaluate response  - Implement non-pharmacological measures as appropriate and evaluate response  - Consider cultural and social influences on pain and pain management  - Notify physician/advanced practitioner if interventions unsuccessful or patient reports new pain  Outcome: Progressing     Problem: INFECTION - ADULT  Goal: Absence or prevention of progression during hospitalization  Description: INTERVENTIONS:  - Assess and monitor for signs and symptoms of infection  - Monitor lab/diagnostic results  - Monitor all insertion sites, i.e. indwelling lines, tubes, and drains  - Monitor endotracheal if appropriate and nasal secretions for changes in amount and color  - [place] appropriate cooling/warming therapies per order  - Administer medications as ordered  - Instruct and encourage patient and family to use good hand hygiene technique  - Identify and instruct in appropriate isolation precautions for identified infection/condition  Outcome: Progressing  Goal: Absence of fever/infection during neutropenic period  Description: INTERVENTIONS:  - Monitor WBC    Outcome: Progressing     Problem: SAFETY ADULT  Goal: Patient will remain free of falls  Description: INTERVENTIONS:  - Educate patient/family on patient safety including physical limitations  - Instruct patient to call for assistance with activity   - Consult OT/PT to assist with strengthening/mobility   - Keep Call bell within reach  - Keep bed low and locked with side rails adjusted as appropriate  - Keep care items and personal belongings within reach  - Initiate and maintain comfort rounds  - Make Fall Risk Sign visible to staff  - Offer Toileting every 2 Hours,  in advance of need  - Initiate/Maintain bed alarm  - Apply yellow socks and bracelet for high fall risk patients  - Consider moving patient to room near nurses station  Outcome: Progressing  Goal: Maintain or return to baseline ADL function  Description: INTERVENTIONS:  -  Assess patient's ability to carry out ADLs; assess patient's baseline for ADL function and identify physical deficits which impact ability to perform ADLs (bathing, care of mouth/teeth, toileting, grooming, dressing, etc.)  - Assess/evaluate cause of self-care deficits   - Assess range of motion  - Assess patient's mobility; develop plan if impaired  - Assess patient's need for assistive devices and provide as appropriate  - Encourage maximum independence but intervene and supervise when necessary  - Involve family in performance of ADLs  - Assess for home care needs following discharge   - Consider OT consult to assist with ADL evaluation and planning for discharge  - Provide patient education as appropriate  Outcome: Progressing  Goal: Maintains/Returns to pre admission functional level  Description: INTERVENTIONS:  - Perform AM-PAC 6 Click Basic Mobility/ Daily Activity assessment daily.  - Set and communicate daily mobility goal to care team and patient/family/caregiver.   - Collaborate with rehabilitation services on mobility goals if consulted  - Perform Range of Motion 3 times a day.  - Reposition patient every 2 hours.  - Dangle patient 3 times a day  - Stand patient 2 times a day  - Ambulate patient 3 times a day  - Out of bed to chair 2 times a day   - Out of bed for meals 3 times a day  - Out of bed for toileting  - Record patient progress and toleration of activity level   Outcome: Progressing     Problem: DISCHARGE PLANNING  Goal: Discharge to home or other facility with appropriate resources  Description: INTERVENTIONS:  - Identify barriers to discharge w/patient and caregiver  - Arrange for needed discharge resources and  transportation as appropriate  - Identify discharge learning needs (meds, wound care, etc.)  - Arrange for interpretive services to assist at discharge as needed  - Refer to Case Management Department for coordinating discharge planning if the patient needs post-hospital services based on physician/advanced practitioner order or complex needs related to functional status, cognitive ability, or social support system  Outcome: Progressing     Problem: Knowledge Deficit  Goal: Patient/family/caregiver demonstrates understanding of disease process, treatment plan, medications, and discharge instructions  Description: Complete learning assessment and assess knowledge base.  Interventions:  - Provide teaching at level of understanding  - Provide teaching via preferred learning methods  Outcome: Progressing     Problem: CARDIOVASCULAR - ADULT  Goal: Maintains optimal cardiac output and hemodynamic stability  Description: INTERVENTIONS:  - Monitor I/O, vital signs and rhythm  - Monitor for S/S and trends of decreased cardiac output  - Administer and titrate ordered vasoactive medications to optimize hemodynamic stability  - Assess quality of pulses, skin color and temperature  - Assess for signs of decreased coronary artery perfusion  - Instruct patient to report change in severity of symptoms  Outcome: Progressing  Goal: Absence of cardiac dysrhythmias or at baseline rhythm  Description: INTERVENTIONS:  - Continuous cardiac monitoring, vital signs, obtain 12 lead EKG if ordered  - Administer antiarrhythmic and heart rate control medications as ordered  - Monitor electrolytes and administer replacement therapy as ordered  Outcome: Progressing     Problem: SKIN/TISSUE INTEGRITY - ADULT  Goal: Skin Integrity remains intact(Skin Breakdown Prevention)  Description: Assess:  -Perform Marcelo assessment every shift  -Clean and moisturize skin every shift  -Inspect skin when repositioning, toileting, and assisting with  ADLS  -Assess extremities for adequate circulation and sensation     Bed Management:  -Have minimal linens on bed & keep smooth, unwrinkled  -Change linens as needed when moist or perspiring    Toileting:  -Offer bedside commode  -Assess for incontinence    Activity:  -Mobilize patient 3 times a day  -Encourage activity and walks on unit  -Encourage or provide ROM exercises   -Turn and reposition patient every 2 Hours  -Use appropriate equipment to lift or move patient in bed  -Instruct/ Assist with weight shifting every 2 when out of bed in chair    Skin Care:  -Avoid use of baby powder, tape, friction and shearing, hot water or constrictive clothing  -Relieve pressure over bony prominences  -Do not massage red bony areas  Outcome: Progressing  Goal: Incision(s), wounds(s) or drain site(s) healing without S/S of infection  Description: INTERVENTIONS  - Assess and document dressing, incision, wound bed, drain sites and surrounding tissue  - Provide patient and family education  - Perform skin care/dressing  Outcome: Progressing  Goal: Pressure injury heals and does not worsen  Description: Interventions:  - Implement low air loss mattress or specialty surface (Criteria met)  - Apply silicone foam dressing  - Instruct/assist with weight shifting every 90 minutes when in chair   - Use special pressure reducing interventions  - Apply fecal or urinary incontinence containment device   - Perform passive or active ROM  - Turn and reposition patient & offload bony prominences every 2 hours   - Utilize friction reducing device or surface for transfers   - Consider nutrition services referral as needed  Outcome: Progressing

## 2025-03-14 NOTE — ASSESSMENT & PLAN NOTE
Wt Readings from Last 3 Encounters:   03/14/25 (!) 146 kg (321 lb 9.6 oz)   03/10/25 (!) 151 kg (333 lb 3.2 oz)   01/31/25 (!) 143 kg (315 lb)     Patient with history of HFpEF, Stage C, NYHA III  Last echo 6/2024 which showed concentric LV hypertrophy with EF 55%, normal systolic functoin. LA moderately dilated. Akinetic basal inferolateral and mid inferolateral region.   Had CardioMEMS device implanted 3/2022, most recent readings: 3/11 16 > 16> 14> 12> 15 > 15 -- of which are above goal (10-12)    Approximate 17lb weight gain over past two weeks, with elevated BNP on admission at 319. CXR without evidence of vascular congestion or pulmonary edema. Was due for f/u HF outpatient 3/6 but did not show to the appointment. Frequent admissions every 1-2 months of recent. Has been taking metolazone 5mg daily since Monday 3/10, normally takes three times per week. Received x1 Bumex 2mg IV in ED before being started on Bumex gtt via admitting team.     GDMT:  --Beta Blocker: Metoprolol succinate 50 mg daily.    --ARNi / ACEi / ARB: None currently  --Aldosterone Antagonist: Spironolactone 25mg daily  --SGLT2 Inhibitor: Jardiance 10mg daily  --Home Diuretic: Bumex 6mg TID with metolazone 5mg TIW    Plan:  Continue metoprolol 50mg daily  Continue aldactone 25mg daily  Continue jardiance 10mg daily  Currently on Bumex 1mg/hr gtt  S/p metolazone 2.5mg x1 3/13, 3/14  BMP/mag BID while on Bumex gtt, recheck earlier today given additional dose of metolazone with hypokalemia

## 2025-03-14 NOTE — PROGRESS NOTES
Progress Note - Heart Failure   Name: Mesfin Cano 59 y.o. male I MRN: [id number]  [facility] Date of Admission: 3/11/2025   Date of Service: 3/14/2025 I Hospital Day: 3    Assessment & Plan  CHF, acute on chronic (HCC)  Wt Readings from Last 3 Encounters:   03/14/25 (!) 146 kg (321 lb 9.6 oz)   03/10/25 (!) 151 kg (333 lb 3.2 oz)   01/31/25 (!) 143 kg (315 lb)     Patient with history of HFpEF, Stage C, NYHA III  Last echo 6/2024 which showed concentric LV hypertrophy with EF 55%, normal systolic functoin. LA moderately dilated. Akinetic basal inferolateral and mid inferolateral region.   Had CardioMEMS device implanted 3/2022, most recent readings: 3/11 16 > 16> 14> 12> 15 > 15 -- of which are above goal (10-12)    Approximate 17lb weight gain over past two weeks, with elevated BNP on admission at 319. CXR without evidence of vascular congestion or pulmonary edema. Was due for f/u HF outpatient 3/6 but did not show to the appointment. Frequent admissions every 1-2 months of recent. Has been taking metolazone 5mg daily since Monday 3/10, normally takes three times per week. Received x1 Bumex 2mg IV in ED before being started on Bumex gtt via admitting team.     GDMT:  --Beta Blocker: Metoprolol succinate 50 mg daily.    --ARNi / ACEi / ARB: None currently  --Aldosterone Antagonist: Spironolactone 25mg daily  --SGLT2 Inhibitor: Jardiance 10mg daily  --Home Diuretic: Bumex 6mg TID with metolazone 5mg TIW    Plan:  Continue metoprolol 50mg daily  Continue aldactone 25mg daily  Continue jardiance 10mg daily  Currently on Bumex 1mg/hr gtt  S/p metolazone 2.5mg x1 3/13, 3/14  BMP/mag BID while on Bumex gtt, recheck earlier today given additional dose of metolazone with hypokalemia    Paroxysmal atrial fibrillation (HCC)  [id number] score of 3 (HTN, HF, DM)  BB, eliquis at home    Plan:  Continue beta blocker as above  Continue Eliquis   Stage 3b chronic kidney disease (HCC)  Lab Results   Component Value  Date    EGFR 41 03/14/2025    EGFR 39 03/13/2025    EGFR 48 03/13/2025    CREATININE 1.79 (H) 03/14/2025    CREATININE 1.86 (H) 03/13/2025    CREATININE 1.57 (H) 03/13/2025     Baseline has historically been 1.5-2  Remains within baseline while on diuresis. Continue to monitor.  Type 2 diabetes mellitus without complication, without long-term current use of insulin ([facility])  Lab Results   Component Value Date    HGBA1C 6.3 03/10/2025       Recent Labs     03/13/25  1055 03/13/25  1556 03/13/25  2135 03/14/25  0618   POCGLU 191* 141* 211* 168*       Blood Sugar Average: Last 72 hrs:  (P) [id number]    Controlled but is risk factor. Management per primary team.     Subjective   Chief Complaint: volume overload  Feels well today, breathing is improved from when he first came in. Walked around the unit and felt mildly winded after but this improved with rest.     Objective :  Temp:  [98.1 °F (36.7 °C)-98.4 °F (36.9 °C)] 98.1 °F (36.7 °C)  HR:  [45-92] 92  BP: (112-144)/(68-91) 125/77  Resp:  [16-18] 16  SpO2:  [93 %-99 %] 95 %  Orthostatic Blood Pressures      Flowsheet Row Most Recent Value   Blood Pressure 125/77 filed at 03/14/2025 0747   Patient Position - Orthostatic VS Sitting filed at 03/12/2025 1526          First Weight: Weight - Scale: (!) 150 kg (331 lb 5.6 oz) (03/12/25 1045)  Vitals:    03/13/25 0900 03/14/25 0600   Weight: (!) 146 kg (322 lb 12.1 oz) (!) 146 kg (321 lb 9.6 oz)     Physical Exam  Vitals and nursing note reviewed.   Constitutional:       General: He is not in acute distress.     Appearance: He is well-developed.   HENT:      Head: Normocephalic and atraumatic.   Eyes:      Conjunctiva/sclera: Conjunctivae normal.   Cardiovascular:      Rate and Rhythm: Normal rate and regular rhythm.      Heart sounds: No murmur heard.  Pulmonary:      Effort: Pulmonary effort is normal. No respiratory distress.      Breath sounds: Normal breath sounds.   Abdominal:      General: Bowel sounds are  normal. There is distension.      Palpations: Abdomen is soft.      Tenderness: There is no abdominal tenderness.   Musculoskeletal:      Cervical back: Neck supple.      Right lower leg: Edema present.      Left lower leg: Edema present.   Skin:     General: Skin is warm and dry.      Capillary Refill: Capillary refill takes less than 2 seconds.   Neurological:      General: No focal deficit present.      Mental Status: He is alert and oriented to person, place, and time.   Psychiatric:         Mood and Affect: Mood normal.           Lab Results: I have reviewed the following results:  Results from last 7 days   Lab Units 03/12/25  0538 03/11/25  2134   WBC Thousand/uL 5.91 5.26   HEMOGLOBIN g/dL 10.9* 11.0*   HEMATOCRIT % 33.3* 33.5*   PLATELETS Thousands/uL 204 208     Results from last 7 days   Lab Units 03/14/25  0531 03/13/25  1811 03/13/25  0601   POTASSIUM mmol/L 3.2* 3.4* 3.6   CHLORIDE mmol/L 89* 90* 94*   CO2 mmol/L 34* 31 31   BUN mg/dL 27* 24 23   CREATININE mg/dL 1.79* 1.86* 1.57*   CALCIUM mg/dL 7.4* 7.8* 7.5*         Lab Results   Component Value Date    HGBA1C 6.3 03/10/2025     Lab Results   Component Value Date    TROPONINI <0.02 09/22/2021     VTE Pharmacologic Prophylaxis: Eliquis  VTE Mechanical Prophylaxis: sequential compression device    Ilda Williamson DO  [facility]  Internal Medicine Residency, PGY-2

## 2025-03-14 NOTE — ASSESSMENT & PLAN NOTE
Lab Results   Component Value Date    HGBA1C 6.3 03/10/2025       Recent Labs     03/13/25  1556 03/13/25  2135 03/14/25  0618 03/14/25  1055   POCGLU 141* 211* 168* 205*       Blood Sugar Average: Last 72 hrs:  (P) [id number]  Sliding scale insulin while hospitalized; we are additionally continuing his Jardiance though given his CHF

## 2025-03-14 NOTE — ASSESSMENT & PLAN NOTE
Lab Results   Component Value Date    HGBA1C 6.3 03/10/2025       Recent Labs     03/13/25  1055 03/13/25  1556 03/13/25  2135 03/14/25  0618   POCGLU 191* 141* 211* 168*       Blood Sugar Average: Last 72 hrs:  (P) [id number]    Controlled but is risk factor. Management per primary team.

## 2025-03-14 NOTE — ASSESSMENT & PLAN NOTE
Wt Readings from Last 3 Encounters:   03/14/25 (!) 146 kg (321 lb 9.6 oz)   03/10/25 (!) 151 kg (333 lb 3.2 oz)   01/31/25 (!) 143 kg (315 lb)     Presented to the ED with lower extremity swelling and shortness of breath, gained about 18 pounds from his dry weight  Home regimen is Bumex 6 mg 3 times daily  Currently on Bumex drip, continue today with metolazone dose yesterday and today  Continue metoprolol, spironolactone, Jardiance  Potassium 3.2, increase potassium chloride supplements to 40 mEq 3 times daily today  Cardiology on board, appreciate recommendations  Daily weights, intake and output, fluid restriction, low-salt diet

## 2025-03-15 LAB
ANION GAP SERPL CALCULATED.3IONS-SCNC: 12 MMOL/L (ref 4–13)
ANION GAP SERPL CALCULATED.3IONS-SCNC: 16 MMOL/L (ref 4–13)
BUN SERPL-MCNC: 36 MG/DL (ref 5–25)
BUN SERPL-MCNC: 38 MG/DL (ref 5–25)
CALCIUM SERPL-MCNC: 7.8 MG/DL (ref 8.4–10.2)
CALCIUM SERPL-MCNC: 8.3 MG/DL (ref 8.4–10.2)
CHLORIDE SERPL-SCNC: 82 MMOL/L (ref 96–108)
CHLORIDE SERPL-SCNC: 83 MMOL/L (ref 96–108)
CO2 SERPL-SCNC: 34 MMOL/L (ref 21–32)
CO2 SERPL-SCNC: 37 MMOL/L (ref 21–32)
CREAT SERPL-MCNC: 1.99 MG/DL (ref 0.6–1.3)
CREAT SERPL-MCNC: 2.42 MG/DL (ref 0.6–1.3)
GFR SERPL CREATININE-BSD FRML MDRD: 28 ML/MIN/1.73SQ M
GFR SERPL CREATININE-BSD FRML MDRD: 36 ML/MIN/1.73SQ M
GLUCOSE SERPL-MCNC: 183 MG/DL (ref 65–140)
GLUCOSE SERPL-MCNC: 211 MG/DL (ref 65–140)
GLUCOSE SERPL-MCNC: 224 MG/DL (ref 65–140)
GLUCOSE SERPL-MCNC: 224 MG/DL (ref 65–140)
GLUCOSE SERPL-MCNC: 225 MG/DL (ref 65–140)
GLUCOSE SERPL-MCNC: 261 MG/DL (ref 65–140)
MAGNESIUM SERPL-MCNC: 2.5 MG/DL (ref 1.9–2.7)
POTASSIUM SERPL-SCNC: 3.3 MMOL/L (ref 3.5–5.3)
POTASSIUM SERPL-SCNC: 3.6 MMOL/L (ref 3.5–5.3)
SODIUM SERPL-SCNC: 132 MMOL/L (ref 135–147)
SODIUM SERPL-SCNC: 132 MMOL/L (ref 135–147)

## 2025-03-15 PROCEDURE — 80048 BASIC METABOLIC PNL TOTAL CA: CPT | Performed by: INTERNAL MEDICINE

## 2025-03-15 PROCEDURE — 99232 SBSQ HOSP IP/OBS MODERATE 35: CPT

## 2025-03-15 PROCEDURE — 80048 BASIC METABOLIC PNL TOTAL CA: CPT

## 2025-03-15 PROCEDURE — 83735 ASSAY OF MAGNESIUM: CPT

## 2025-03-15 PROCEDURE — 82948 REAGENT STRIP/BLOOD GLUCOSE: CPT

## 2025-03-15 PROCEDURE — 99232 SBSQ HOSP IP/OBS MODERATE 35: CPT | Performed by: INTERNAL MEDICINE

## 2025-03-15 RX ORDER — METHOCARBAMOL 500 MG/1
500 TABLET, FILM COATED ORAL EVERY 6 HOURS PRN
Status: DISCONTINUED | OUTPATIENT
Start: 2025-03-15 | End: 2025-03-20 | Stop reason: HOSPADM

## 2025-03-15 RX ORDER — POTASSIUM CHLORIDE 14.9 MG/ML
20 INJECTION INTRAVENOUS ONCE
Status: COMPLETED | OUTPATIENT
Start: 2025-03-15 | End: 2025-03-15

## 2025-03-15 RX ORDER — POTASSIUM CHLORIDE 1500 MG/1
40 TABLET, EXTENDED RELEASE ORAL
Status: DISCONTINUED | OUTPATIENT
Start: 2025-03-15 | End: 2025-03-20 | Stop reason: HOSPADM

## 2025-03-15 RX ADMIN — MAGNESIUM OXIDE TAB 400 MG (241.3 MG ELEMENTAL MG) 400 MG: 400 (241.3 MG) TAB at 08:57

## 2025-03-15 RX ADMIN — BUDESONIDE AND FORMOTEROL FUMARATE DIHYDRATE 2 PUFF: 160; 4.5 AEROSOL RESPIRATORY (INHALATION) at 08:58

## 2025-03-15 RX ADMIN — SPIRONOLACTONE 25 MG: 25 TABLET, FILM COATED ORAL at 08:55

## 2025-03-15 RX ADMIN — NYSTATIN: 100000 POWDER TOPICAL at 09:00

## 2025-03-15 RX ADMIN — METHOCARBAMOL 500 MG: 500 TABLET ORAL at 15:34

## 2025-03-15 RX ADMIN — POTASSIUM CHLORIDE 40 MEQ: 1500 TABLET, EXTENDED RELEASE ORAL at 08:55

## 2025-03-15 RX ADMIN — INSULIN LISPRO 1 UNITS: 100 INJECTION, SOLUTION INTRAVENOUS; SUBCUTANEOUS at 17:35

## 2025-03-15 RX ADMIN — APIXABAN 5 MG: 5 TABLET, FILM COATED ORAL at 08:55

## 2025-03-15 RX ADMIN — LORATADINE 10 MG: 10 TABLET ORAL at 08:55

## 2025-03-15 RX ADMIN — CYANOCOBALAMIN TAB 500 MCG 1000 MCG: 500 TAB at 08:55

## 2025-03-15 RX ADMIN — METOPROLOL SUCCINATE 50 MG: 50 TABLET, EXTENDED RELEASE ORAL at 08:55

## 2025-03-15 RX ADMIN — APIXABAN 5 MG: 5 TABLET, FILM COATED ORAL at 17:33

## 2025-03-15 RX ADMIN — EMPAGLIFLOZIN 10 MG: 10 TABLET, FILM COATED ORAL at 08:56

## 2025-03-15 RX ADMIN — PREGABALIN 50 MG: 50 CAPSULE ORAL at 22:43

## 2025-03-15 RX ADMIN — POTASSIUM CHLORIDE 40 MEQ: 1500 TABLET, EXTENDED RELEASE ORAL at 15:34

## 2025-03-15 RX ADMIN — INSULIN LISPRO 1 UNITS: 100 INJECTION, SOLUTION INTRAVENOUS; SUBCUTANEOUS at 08:54

## 2025-03-15 RX ADMIN — INSULIN LISPRO 2 UNITS: 100 INJECTION, SOLUTION INTRAVENOUS; SUBCUTANEOUS at 22:43

## 2025-03-15 RX ADMIN — POTASSIUM CHLORIDE 40 MEQ: 1500 TABLET, EXTENDED RELEASE ORAL at 13:14

## 2025-03-15 RX ADMIN — MAGNESIUM OXIDE TAB 400 MG (241.3 MG ELEMENTAL MG) 400 MG: 400 (241.3 MG) TAB at 17:33

## 2025-03-15 RX ADMIN — Medication 1 MG/HR: at 22:42

## 2025-03-15 RX ADMIN — ATORVASTATIN CALCIUM 10 MG: 10 TABLET, FILM COATED ORAL at 08:55

## 2025-03-15 RX ADMIN — FLUTICASONE PROPIONATE 1 SPRAY: 50 SPRAY, METERED NASAL at 08:57

## 2025-03-15 RX ADMIN — Medication 1 MG/HR: at 16:18

## 2025-03-15 RX ADMIN — FLUTICASONE PROPIONATE 1 SPRAY: 50 SPRAY, METERED NASAL at 17:34

## 2025-03-15 RX ADMIN — NYSTATIN: 100000 POWDER TOPICAL at 18:28

## 2025-03-15 RX ADMIN — MONTELUKAST 10 MG: 10 TABLET, FILM COATED ORAL at 22:43

## 2025-03-15 RX ADMIN — POTASSIUM CHLORIDE 20 MEQ: 14.9 INJECTION, SOLUTION INTRAVENOUS at 13:48

## 2025-03-15 RX ADMIN — Medication 1 MG/HR: at 01:27

## 2025-03-15 RX ADMIN — BUDESONIDE AND FORMOTEROL FUMARATE DIHYDRATE 2 PUFF: 160; 4.5 AEROSOL RESPIRATORY (INHALATION) at 17:35

## 2025-03-15 RX ADMIN — INSULIN LISPRO 1 UNITS: 100 INJECTION, SOLUTION INTRAVENOUS; SUBCUTANEOUS at 11:47

## 2025-03-15 NOTE — ASSESSMENT & PLAN NOTE
Wt Readings from Last 3 Encounters:   03/15/25 (!) 141 kg (311 lb 4.6 oz)   03/10/25 (!) 151 kg (333 lb 3.2 oz)   01/31/25 (!) 143 kg (315 lb)     Presented to the ED with lower extremity swelling and shortness of breath, gained about 18 pounds from his dry weight  Home regimen is Bumex 6 mg 3 times daily  Currently on Bumex drip, s/p metolazone 3/13 and 3/14  Continue metoprolol, spironolactone, Jardiance  Potassium 3.3, will give a dose of potassium chloride 20 mg IV, continue potassium chloride 40 mill equivalents 3 times daily  Continue Bumex drip today  Recheck BMP at 6 PM  Cardiology on board, appreciate recommendations  Daily weights, intake and output, fluid restriction, low-salt diet

## 2025-03-15 NOTE — PLAN OF CARE
Problem: PAIN - ADULT  Goal: Verbalizes/displays adequate comfort level or baseline comfort level  Description: Interventions:  - Encourage patient to monitor pain and request assistance  - Assess pain using appropriate pain scale  - Administer analgesics based on type and severity of pain and evaluate response  - Implement non-pharmacological measures as appropriate and evaluate response  - Consider cultural and social influences on pain and pain management  - Notify physician/advanced practitioner if interventions unsuccessful or patient reports new pain  Outcome: Progressing     Problem: INFECTION - ADULT  Goal: Absence or prevention of progression during hospitalization  Description: INTERVENTIONS:  - Assess and monitor for signs and symptoms of infection  - Monitor lab/diagnostic results  - Monitor all insertion sites, i.e. indwelling lines, tubes, and drains  - Monitor endotracheal if appropriate and nasal secretions for changes in amount and color  - [place] appropriate cooling/warming therapies per order  - Administer medications as ordered  - Instruct and encourage patient and family to use good hand hygiene technique  - Identify and instruct in appropriate isolation precautions for identified infection/condition  Outcome: Progressing  Goal: Absence of fever/infection during neutropenic period  Description: INTERVENTIONS:  - Monitor WBC    Outcome: Progressing     Problem: SAFETY ADULT  Goal: Patient will remain free of falls  Description: INTERVENTIONS:  - Educate patient/family on patient safety including physical limitations  - Instruct patient to call for assistance with activity   - Consult OT/PT to assist with strengthening/mobility   - Keep Call bell within reach  - Keep bed low and locked with side rails adjusted as appropriate  - Keep care items and personal belongings within reach  - Initiate and maintain comfort rounds  - Make Fall Risk Sign visible to staff  - Offer Toileting every 2 Hours,  in advance of need  - Initiate/Maintain bed alarm  - Apply yellow socks and bracelet for high fall risk patients  - Consider moving patient to room near nurses station  Outcome: Progressing  Goal: Maintain or return to baseline ADL function  Description: INTERVENTIONS:  -  Assess patient's ability to carry out ADLs; assess patient's baseline for ADL function and identify physical deficits which impact ability to perform ADLs (bathing, care of mouth/teeth, toileting, grooming, dressing, etc.)  - Assess/evaluate cause of self-care deficits   - Assess range of motion  - Assess patient's mobility; develop plan if impaired  - Assess patient's need for assistive devices and provide as appropriate  - Encourage maximum independence but intervene and supervise when necessary  - Involve family in performance of ADLs  - Assess for home care needs following discharge   - Consider OT consult to assist with ADL evaluation and planning for discharge  - Provide patient education as appropriate  Outcome: Progressing  Goal: Maintains/Returns to pre admission functional level  Description: INTERVENTIONS:  - Perform AM-PAC 6 Click Basic Mobility/ Daily Activity assessment daily.  - Set and communicate daily mobility goal to care team and patient/family/caregiver.   - Collaborate with rehabilitation services on mobility goals if consulted  - Perform Range of Motion 3 times a day.  - Reposition patient every 2 hours.  - Dangle patient 3 times a day  - Stand patient 2 times a day  - Ambulate patient 3 times a day  - Out of bed to chair 2 times a day   - Out of bed for meals 3 times a day  - Out of bed for toileting  - Record patient progress and toleration of activity level   Outcome: Progressing     Problem: DISCHARGE PLANNING  Goal: Discharge to home or other facility with appropriate resources  Description: INTERVENTIONS:  - Identify barriers to discharge w/patient and caregiver  - Arrange for needed discharge resources and  transportation as appropriate  - Identify discharge learning needs (meds, wound care, etc.)  - Arrange for interpretive services to assist at discharge as needed  - Refer to Case Management Department for coordinating discharge planning if the patient needs post-hospital services based on physician/advanced practitioner order or complex needs related to functional status, cognitive ability, or social support system  Outcome: Progressing     Problem: Knowledge Deficit  Goal: Patient/family/caregiver demonstrates understanding of disease process, treatment plan, medications, and discharge instructions  Description: Complete learning assessment and assess knowledge base.  Interventions:  - Provide teaching at level of understanding  - Provide teaching via preferred learning methods  Outcome: Progressing     Problem: CARDIOVASCULAR - ADULT  Goal: Maintains optimal cardiac output and hemodynamic stability  Description: INTERVENTIONS:  - Monitor I/O, vital signs and rhythm  - Monitor for S/S and trends of decreased cardiac output  - Administer and titrate ordered vasoactive medications to optimize hemodynamic stability  - Assess quality of pulses, skin color and temperature  - Assess for signs of decreased coronary artery perfusion  - Instruct patient to report change in severity of symptoms  Outcome: Progressing  Goal: Absence of cardiac dysrhythmias or at baseline rhythm  Description: INTERVENTIONS:  - Continuous cardiac monitoring, vital signs, obtain 12 lead EKG if ordered  - Administer antiarrhythmic and heart rate control medications as ordered  - Monitor electrolytes and administer replacement therapy as ordered  Outcome: Progressing     Problem: SKIN/TISSUE INTEGRITY - ADULT  Goal: Skin Integrity remains intact(Skin Breakdown Prevention)  Description: Assess:  -Perform Marcelo assessment every shift  -Clean and moisturize skin every shift  -Inspect skin when repositioning, toileting, and assisting with  ADLS  -Assess extremities for adequate circulation and sensation     Bed Management:  -Have minimal linens on bed & keep smooth, unwrinkled  -Change linens as needed when moist or perspiring    Toileting:  -Offer bedside commode  -Assess for incontinence    Activity:  -Mobilize patient 3 times a day  -Encourage activity and walks on unit  -Encourage or provide ROM exercises   -Turn and reposition patient every 2 Hours  -Use appropriate equipment to lift or move patient in bed  -Instruct/ Assist with weight shifting every 2 when out of bed in chair    Skin Care:  -Avoid use of baby powder, tape, friction and shearing, hot water or constrictive clothing  -Relieve pressure over bony prominences  -Do not massage red bony areas  Outcome: Progressing  Goal: Incision(s), wounds(s) or drain site(s) healing without S/S of infection  Description: INTERVENTIONS  - Assess and document dressing, incision, wound bed, drain sites and surrounding tissue  - Provide patient and family education  - Perform skin care/dressing  Outcome: Progressing  Goal: Pressure injury heals and does not worsen  Description: Interventions:  - Implement low air loss mattress or specialty surface (Criteria met)  - Apply silicone foam dressing  - Instruct/assist with weight shifting every 90 minutes when in chair   - Use special pressure reducing interventions  - Apply fecal or urinary incontinence containment device   - Perform passive or active ROM  - Turn and reposition patient & offload bony prominences every 2 hours   - Utilize friction reducing device or surface for transfers   - Consider nutrition services referral as needed  Outcome: Progressing

## 2025-03-15 NOTE — ASSESSMENT & PLAN NOTE
Lab Results   Component Value Date    EGFR 36 03/15/2025    EGFR 35 03/14/2025    EGFR 41 03/14/2025    CREATININE 1.99 (H) 03/15/2025    CREATININE 2.01 (H) 03/14/2025    CREATININE 1.79 (H) 03/14/2025     Baseline creatinine seems to be from 1.5-2.2  Creatinine currently at baseline on Bumex drip   Continue to monitor labs

## 2025-03-15 NOTE — PLAN OF CARE
Problem: PAIN - ADULT  Goal: Verbalizes/displays adequate comfort level or baseline comfort level  Description: Interventions:  - Encourage patient to monitor pain and request assistance  - Assess pain using appropriate pain scale  - Administer analgesics based on type and severity of pain and evaluate response  - Implement non-pharmacological measures as appropriate and evaluate response  - Consider cultural and social influences on pain and pain management  - Notify physician/advanced practitioner if interventions unsuccessful or patient reports new pain  Outcome: Progressing     Problem: CARDIOVASCULAR - ADULT  Goal: Maintains optimal cardiac output and hemodynamic stability  Description: INTERVENTIONS:  - Monitor I/O, vital signs and rhythm  - Monitor for S/S and trends of decreased cardiac output  - Administer and titrate ordered vasoactive medications to optimize hemodynamic stability  - Assess quality of pulses, skin color and temperature  - Assess for signs of decreased coronary artery perfusion  - Instruct patient to report change in severity of symptoms  Outcome: Progressing     Problem: CARDIOVASCULAR - ADULT  Goal: Absence of cardiac dysrhythmias or at baseline rhythm  Description: INTERVENTIONS:  - Continuous cardiac monitoring, vital signs, obtain 12 lead EKG if ordered  - Administer antiarrhythmic and heart rate control medications as ordered  - Monitor electrolytes and administer replacement therapy as ordered  Outcome: Progressing

## 2025-03-15 NOTE — ASSESSMENT & PLAN NOTE
Lab Results   Component Value Date    HGBA1C 6.3 03/10/2025       Recent Labs     03/14/25  2221 03/15/25  0630 03/15/25  1103 03/15/25  1624   POCGLU 215* 183* 211* 224*       Blood Sugar Average: Last 72 hrs:  (P) [id number]  Sliding scale insulin while hospitalized; we are additionally continuing his Jardiance though given his CHF

## 2025-03-15 NOTE — PROGRESS NOTES
Progress Note - Hospitalist   Name: Mesfin Cano 59 y.o. male I MRN: [id number]  [facility] Date of Admission: 3/11/2025   Date of Service: 3/15/2025 I Hospital Day: 4    Assessment & Plan  CHF, acute on chronic (HCC)  Wt Readings from Last 3 Encounters:   03/15/25 (!) 141 kg (311 lb 4.6 oz)   03/10/25 (!) 151 kg (333 lb 3.2 oz)   01/31/25 (!) 143 kg (315 lb)     Presented to the ED with lower extremity swelling and shortness of breath, gained about 18 pounds from his dry weight  Home regimen is Bumex 6 mg 3 times daily  Currently on Bumex drip, s/p metolazone 3/13 and 3/14  Continue metoprolol, spironolactone, Jardiance  Potassium 3.3, will give a dose of potassium chloride 20 mg IV, continue potassium chloride 40 mill equivalents 3 times daily  Continue Bumex drip today  Recheck BMP at 6 PM  Cardiology on board, appreciate recommendations  Daily weights, intake and output, fluid restriction, low-salt diet  Paroxysmal atrial fibrillation (HCC)  Continue metoprolol succinate 50 mg daily  Continue Eliquis 5 mg twice daily    Stage 3b chronic kidney disease (HCC)  Lab Results   Component Value Date    EGFR 36 03/15/2025    EGFR 35 03/14/2025    EGFR 41 03/14/2025    CREATININE 1.99 (H) 03/15/2025    CREATININE 2.01 (H) 03/14/2025    CREATININE 1.79 (H) 03/14/2025     Baseline creatinine seems to be from 1.5-2.2  Creatinine currently at baseline on Bumex drip   Continue to monitor labs   Type 2 diabetes mellitus without complication, without long-term current use of insulin (HCC)  Lab Results   Component Value Date    HGBA1C 6.3 03/10/2025       Recent Labs     03/14/25  2221 03/15/25  0630 03/15/25  1103 03/15/25  1624   POCGLU 215* 183* 211* 224*       Blood Sugar Average: Last 72 hrs:  (P) [id number]  Sliding scale insulin while hospitalized; we are additionally continuing his Jardiance though given his CHF    VTE Pharmacologic Prophylaxis: VTE Score: 3 Moderate Risk (Score 3-4) -  Pharmacological DVT Prophylaxis Ordered: apixaban (Eliquis).    Mobility:   Basic Mobility Inpatient Raw Score: 24  JH-HLM Goal: 8: Walk 250 feet or more  JH-HLM Achieved: 8: Walk 250 feet ot more  JH-HLM Goal achieved. Continue to encourage appropriate mobility.    Patient Centered Rounds: I performed bedside rounds with nursing staff today.   Discussions with Specialists or Other Care Team Provider: Nursing    Education and Discussions with Family / Patient: Patient declined call to .     Current Length of Stay: 4 day(s)  Current Patient Status: Inpatient   Certification Statement: The patient will continue to require additional inpatient hospital stay due to heart failure  Discharge Plan: Anticipate discharge in 48 hrs to home.    Code Status: Level 1 - Full Code    Subjective   Patient was seen evaluated bedside, feeling better but still has lower extremity swelling    Objective :  Temp:  [97.6 °F (36.4 °C)-98.1 °F (36.7 °C)] 97.7 °F (36.5 °C)  HR:  [74-91] 80  BP: (106-146)/(67-87) 121/73  Resp:  [16-19] 19  SpO2:  [90 %-98 %] 98 %  O2 Device: None (Room air)    Body mass index is 41.07 kg/m².     Input and Output Summary (last 24 hours):     Intake/Output Summary (Last 24 hours) at 3/15/2025 1639  Last data filed at 3/15/2025 1536  Gross per 24 hour   Intake 720 ml   Output 2200 ml   Net -1480 ml       Physical Exam  Constitutional:       General: He is not in acute distress.     Appearance: He is obese.   HENT:      Head: Atraumatic.   Cardiovascular:      Rate and Rhythm: Normal rate and regular rhythm.      Heart sounds: No murmur heard.  Pulmonary:      Effort: Pulmonary effort is normal. No respiratory distress.      Breath sounds: Normal breath sounds. No wheezing.   Abdominal:      General: Bowel sounds are normal. There is no distension.      Palpations: Abdomen is soft.      Tenderness: There is no abdominal tenderness.   Musculoskeletal:      Comments: Bilateral lower extremity  nonpitting edema   Neurological:      General: No focal deficit present.      Mental Status: He is alert.   Psychiatric:         Mood and Affect: Mood normal.           Lines/Drains:        Telemetry:  Telemetry Orders (From admission, onward)               24 Hour Telemetry Monitoring  Continuous x 24 Hours (Telem)        Expiring   Question:  Reason for 24 Hour Telemetry  Answer:  Decompensated CHF- and any one of the following: continuous diuretic infusion or total diuretic dose >200 mg daily, associated electrolyte derangement (I.e. K < 3.0), inotropic drip (continuous infusion), hx of ventricular arrhythmia, or new EF < 35%                     Telemetry Reviewed: Normal Sinus Rhythm  Indication for Continued Telemetry Use: Acute CHF on >200 mg lasix/day or equivalent dose or with new reduced EF.                Lab Results: I have reviewed the following results:   Results from last 7 days   Lab Units 03/12/25  0538 03/11/25  2134   WBC Thousand/uL 5.91 5.26   HEMOGLOBIN g/dL 10.9* 11.0*   HEMATOCRIT % 33.3* 33.5*   PLATELETS Thousands/uL 204 208   SEGS PCT %  --  60   LYMPHO PCT %  --  22   MONO PCT %  --  10   EOS PCT %  --  6     Results from last 7 days   Lab Units 03/15/25  0922 03/12/25  0538 03/11/25  2134   SODIUM mmol/L 132*   < > 136   POTASSIUM mmol/L 3.3*   < > 4.1   CHLORIDE mmol/L 83*   < > 102   CO2 mmol/L 37*   < > 22   BUN mg/dL 36*   < > 18   CREATININE mg/dL 1.99*   < > 1.14   ANION GAP mmol/L 12   < > 12   CALCIUM mg/dL 7.8*   < > 7.2*   ALBUMIN g/dL  --   --  3.2*   TOTAL BILIRUBIN mg/dL  --   --  0.30   ALK PHOS U/L  --   --  83   ALT U/L  --   --  6*   AST U/L  --   --  10*   GLUCOSE RANDOM mg/dL 224*   < > 170*    < > = values in this interval not displayed.         Results from last 7 days   Lab Units 03/15/25  1624 03/15/25  1103 03/15/25  0630 03/14/25  2221 03/14/25  1625 03/14/25  1055 03/14/25  0618 03/13/25  2135 03/13/25  1556 03/13/25  1055 03/13/25  0813 03/13/25  0616   POC  GLUCOSE mg/dl 224* 211* 183* 215* 204* 205* 168* 211* 141* 191* 239* 154*     Results from last 7 days   Lab Units 03/10/25  0906   HEMOGLOBIN A1C  6.3           Recent Cultures (last 7 days):         Imaging Results Review: I reviewed radiology reports from this admission including: chest xray.  Other Study Results Review: No additional pertinent studies reviewed.    Last 24 Hours Medication List:     Current Facility-Administered Medications:     acetaminophen (TYLENOL) tablet 650 mg, Q6H PRN    apixaban (ELIQUIS) tablet 5 mg, BID    atorvastatin (LIPITOR) tablet 10 mg, Daily    budesonide-formoterol (SYMBICORT) 160-4.5 mcg/act inhaler 2 puff, BID    bumetanide (BUMEX) 12.5 mg infusion 50 mL, Continuous, Last Rate: 1 mg/hr (03/15/25 1618)    cyanocobalamin (VITAMIN B-12) tablet 1,000 mcg, Daily    Empagliflozin (JARDIANCE) tablet 10 mg, Daily    famotidine (PEPCID) tablet 20 mg, BID PRN    fluticasone (FLONASE) 50 mcg/act nasal spray 1 spray, BID    insulin lispro (HumALOG/ADMELOG) 100 units/mL subcutaneous injection 1-5 Units, TID AC **AND** Fingerstick Glucose (POCT), TID AC    insulin lispro (HumALOG/ADMELOG) 100 units/mL subcutaneous injection 1-5 Units, HS    loratadine (CLARITIN) tablet 10 mg, Daily    magnesium Oxide (MAG-OX) tablet 400 mg, BID    methocarbamol (ROBAXIN) tablet 500 mg, Q6H PRN    metoprolol succinate (TOPROL-XL) 24 hr tablet 50 mg, Daily    montelukast (SINGULAIR) tablet 10 mg, HS    nystatin (MYCOSTATIN) powder, BID    potassium chloride (Klor-Con M20) CR tablet 40 mEq, TID With Meals    pregabalin (LYRICA) capsule 50 mg, HS    spironolactone (ALDACTONE) tablet 25 mg, Daily    Administrative Statements   Today, Patient Was Seen By: Rachele Pyle MD      **Please Note: This note may have been constructed using a voice recognition system.**

## 2025-03-16 LAB
ANION GAP SERPL CALCULATED.3IONS-SCNC: 12 MMOL/L (ref 4–13)
ANION GAP SERPL CALCULATED.3IONS-SCNC: 14 MMOL/L (ref 4–13)
BUN SERPL-MCNC: 42 MG/DL (ref 5–25)
BUN SERPL-MCNC: 46 MG/DL (ref 5–25)
CALCIUM SERPL-MCNC: 7.6 MG/DL (ref 8.4–10.2)
CALCIUM SERPL-MCNC: 8.1 MG/DL (ref 8.4–10.2)
CHLORIDE SERPL-SCNC: 82 MMOL/L (ref 96–108)
CHLORIDE SERPL-SCNC: 84 MMOL/L (ref 96–108)
CO2 SERPL-SCNC: 36 MMOL/L (ref 21–32)
CO2 SERPL-SCNC: 38 MMOL/L (ref 21–32)
CREAT SERPL-MCNC: 2.33 MG/DL (ref 0.6–1.3)
CREAT SERPL-MCNC: 2.38 MG/DL (ref 0.6–1.3)
GFR SERPL CREATININE-BSD FRML MDRD: 29 ML/MIN/1.73SQ M
GFR SERPL CREATININE-BSD FRML MDRD: 29 ML/MIN/1.73SQ M
GLUCOSE SERPL-MCNC: 213 MG/DL (ref 65–140)
GLUCOSE SERPL-MCNC: 217 MG/DL (ref 65–140)
GLUCOSE SERPL-MCNC: 219 MG/DL (ref 65–140)
GLUCOSE SERPL-MCNC: 241 MG/DL (ref 65–140)
GLUCOSE SERPL-MCNC: 285 MG/DL (ref 65–140)
MAGNESIUM SERPL-MCNC: 2.5 MG/DL (ref 1.9–2.7)
POTASSIUM SERPL-SCNC: 3.2 MMOL/L (ref 3.5–5.3)
POTASSIUM SERPL-SCNC: 3.5 MMOL/L (ref 3.5–5.3)
SODIUM SERPL-SCNC: 132 MMOL/L (ref 135–147)
SODIUM SERPL-SCNC: 134 MMOL/L (ref 135–147)

## 2025-03-16 PROCEDURE — 99232 SBSQ HOSP IP/OBS MODERATE 35: CPT

## 2025-03-16 PROCEDURE — 99232 SBSQ HOSP IP/OBS MODERATE 35: CPT | Performed by: INTERNAL MEDICINE

## 2025-03-16 PROCEDURE — 83735 ASSAY OF MAGNESIUM: CPT | Performed by: INTERNAL MEDICINE

## 2025-03-16 PROCEDURE — 80048 BASIC METABOLIC PNL TOTAL CA: CPT | Performed by: INTERNAL MEDICINE

## 2025-03-16 PROCEDURE — 80048 BASIC METABOLIC PNL TOTAL CA: CPT

## 2025-03-16 PROCEDURE — 82948 REAGENT STRIP/BLOOD GLUCOSE: CPT

## 2025-03-16 RX ORDER — INSULIN LISPRO 100 [IU]/ML
2-12 INJECTION, SOLUTION INTRAVENOUS; SUBCUTANEOUS
Status: DISCONTINUED | OUTPATIENT
Start: 2025-03-16 | End: 2025-03-19

## 2025-03-16 RX ORDER — INSULIN GLARGINE 100 [IU]/ML
8 INJECTION, SOLUTION SUBCUTANEOUS EVERY MORNING
Status: DISCONTINUED | OUTPATIENT
Start: 2025-03-16 | End: 2025-03-17

## 2025-03-16 RX ADMIN — BUDESONIDE AND FORMOTEROL FUMARATE DIHYDRATE 2 PUFF: 160; 4.5 AEROSOL RESPIRATORY (INHALATION) at 17:04

## 2025-03-16 RX ADMIN — APIXABAN 5 MG: 5 TABLET, FILM COATED ORAL at 08:15

## 2025-03-16 RX ADMIN — POTASSIUM CHLORIDE 40 MEQ: 1500 TABLET, EXTENDED RELEASE ORAL at 11:43

## 2025-03-16 RX ADMIN — MAGNESIUM OXIDE TAB 400 MG (241.3 MG ELEMENTAL MG) 400 MG: 400 (241.3 MG) TAB at 17:03

## 2025-03-16 RX ADMIN — EMPAGLIFLOZIN 10 MG: 10 TABLET, FILM COATED ORAL at 08:19

## 2025-03-16 RX ADMIN — INSULIN LISPRO 1 UNITS: 100 INJECTION, SOLUTION INTRAVENOUS; SUBCUTANEOUS at 08:17

## 2025-03-16 RX ADMIN — MAGNESIUM OXIDE TAB 400 MG (241.3 MG ELEMENTAL MG) 400 MG: 400 (241.3 MG) TAB at 08:15

## 2025-03-16 RX ADMIN — MONTELUKAST 10 MG: 10 TABLET, FILM COATED ORAL at 21:27

## 2025-03-16 RX ADMIN — Medication 0.5 MG/HR: at 21:30

## 2025-03-16 RX ADMIN — POTASSIUM CHLORIDE 40 MEQ: 1500 TABLET, EXTENDED RELEASE ORAL at 08:15

## 2025-03-16 RX ADMIN — METOPROLOL SUCCINATE 50 MG: 50 TABLET, EXTENDED RELEASE ORAL at 08:15

## 2025-03-16 RX ADMIN — FLUTICASONE PROPIONATE 1 SPRAY: 50 SPRAY, METERED NASAL at 17:04

## 2025-03-16 RX ADMIN — INSULIN GLARGINE 8 UNITS: 100 INJECTION, SOLUTION SUBCUTANEOUS at 08:25

## 2025-03-16 RX ADMIN — INSULIN LISPRO 4 UNITS: 100 INJECTION, SOLUTION INTRAVENOUS; SUBCUTANEOUS at 17:08

## 2025-03-16 RX ADMIN — ATORVASTATIN CALCIUM 10 MG: 10 TABLET, FILM COATED ORAL at 08:15

## 2025-03-16 RX ADMIN — LORATADINE 10 MG: 10 TABLET ORAL at 08:15

## 2025-03-16 RX ADMIN — POTASSIUM CHLORIDE 40 MEQ: 1500 TABLET, EXTENDED RELEASE ORAL at 17:03

## 2025-03-16 RX ADMIN — FLUTICASONE PROPIONATE 1 SPRAY: 50 SPRAY, METERED NASAL at 08:09

## 2025-03-16 RX ADMIN — NYSTATIN: 100000 POWDER TOPICAL at 08:09

## 2025-03-16 RX ADMIN — APIXABAN 5 MG: 5 TABLET, FILM COATED ORAL at 17:04

## 2025-03-16 RX ADMIN — SPIRONOLACTONE 25 MG: 25 TABLET, FILM COATED ORAL at 08:15

## 2025-03-16 RX ADMIN — BUDESONIDE AND FORMOTEROL FUMARATE DIHYDRATE 2 PUFF: 160; 4.5 AEROSOL RESPIRATORY (INHALATION) at 08:09

## 2025-03-16 RX ADMIN — INSULIN LISPRO 2 UNITS: 100 INJECTION, SOLUTION INTRAVENOUS; SUBCUTANEOUS at 11:44

## 2025-03-16 RX ADMIN — CYANOCOBALAMIN TAB 500 MCG 1000 MCG: 500 TAB at 08:15

## 2025-03-16 RX ADMIN — PREGABALIN 50 MG: 50 CAPSULE ORAL at 21:27

## 2025-03-16 RX ADMIN — NYSTATIN: 100000 POWDER TOPICAL at 17:10

## 2025-03-16 NOTE — PROGRESS NOTES
Progress Note - Hospitalist   Name: Mesfin Cano 59 y.o. male I MRN: [id number]  Unit/Bed#: [facility] 730-01 I Date of Admission: 3/11/2025   Date of Service: 3/16/2025 I Hospital Day: 5    Assessment & Plan  CHF, acute on chronic (HCC)  Wt Readings from Last 3 Encounters:   03/16/25 (!) 140 kg (309 lb 11.9 oz)   03/10/25 (!) 151 kg (333 lb 3.2 oz)   01/31/25 (!) 143 kg (315 lb)     Presented to the ED with lower extremity swelling and shortness of breath, gained about 18 pounds from his dry weight  Home regimen is Bumex 6 mg 3 times daily  Currently on Bumex drip, s/p metolazone 3/13 and 3/14  Continue metoprolol, spironolactone, Jardiance  Bumex drip decreased from 1 mg/h to 0.5 mg/h  Continue potassium chloride 40 mEq 3 times daily  Recheck BMP at 6 PM  Cardiology on board, appreciate recommendations  Daily weights, intake and output, fluid restriction, low-salt diet  Paroxysmal atrial fibrillation (HCC)  Continue metoprolol succinate 50 mg daily  Continue Eliquis 5 mg twice daily    Stage 3b chronic kidney disease (HCC)  Lab Results   Component Value Date    EGFR 29 03/16/2025    EGFR 28 03/15/2025    EGFR 36 03/15/2025    CREATININE 2.33 (H) 03/16/2025    CREATININE 2.42 (H) 03/15/2025    CREATININE 1.99 (H) 03/15/2025     Baseline creatinine seems to be from 1.5-2.2  Creatinine slightly above baseline  Bumex drip was decreased from 1 mg/h to 0.5 mg/h today  Continue to monitor labs   Type 2 diabetes mellitus without complication, without long-term current use of insulin (HCC)  Lab Results   Component Value Date    HGBA1C 6.3 03/10/2025       Recent Labs     03/15/25  1624 03/15/25  2148 03/16/25  0644 03/16/25  1115   POCGLU 224* 225* 217* 285*       Blood Sugar Average: Last 72 hrs:  (P) [id number]  Start Lantus 8 units a.m.  Increase insulin sliding scale algorithm from 1 to 4  Monitor blood sugar    VTE Pharmacologic Prophylaxis: VTE Score: 3 Moderate Risk (Score 3-4) - Pharmacological DVT Prophylaxis  Ordered: apixaban (Eliquis).    Mobility:   Basic Mobility Inpatient Raw Score: 24  JH-HLM Goal: 8: Walk 250 feet or more  JH-HLM Achieved: 8: Walk 250 feet ot more  JH-HLM Goal achieved. Continue to encourage appropriate mobility.    Patient Centered Rounds: I performed bedside rounds with nursing staff today.   Discussions with Specialists or Other Care Team Provider: nursing, cardiology    Education and Discussions with Family / Patient: Patient declined call to .     Current Length of Stay: 5 day(s)  Current Patient Status: Inpatient   Certification Statement: The patient will continue to require additional inpatient hospital stay due to heart failure exacerbation, still on Bumex drip  Discharge Plan: Anticipate discharge in 24-48 hrs to home.    Code Status: Level 1 - Full Code    Subjective   Patient was seen evaluated bedside, urinating well, feeling better, still with slight lower extremity swelling    Objective :  Temp:  [97.7 °F (36.5 °C)-97.9 °F (36.6 °C)] 97.9 °F (36.6 °C)  HR:  [77-90] 90  BP: (110-132)/(63-76) 132/76  Resp:  [18] 18  SpO2:  [95 %-98 %] 95 %    Body mass index is 40.87 kg/m².     Input and Output Summary (last 24 hours):     Intake/Output Summary (Last 24 hours) at 3/16/2025 1403  Last data filed at 3/16/2025 1100  Gross per 24 hour   Intake 1380 ml   Output 4300 ml   Net -2920 ml       Physical Exam  Constitutional:       General: He is not in acute distress.     Appearance: He is obese.   HENT:      Head: Atraumatic.   Cardiovascular:      Rate and Rhythm: Normal rate and regular rhythm.      Heart sounds: No murmur heard.  Pulmonary:      Effort: Pulmonary effort is normal. No respiratory distress.      Breath sounds: Normal breath sounds. No wheezing.   Abdominal:      General: Bowel sounds are normal. There is no distension.      Palpations: Abdomen is soft.      Tenderness: There is no abdominal tenderness.   Musculoskeletal:      Cervical back: Neck supple.       Comments: Bilateral lower extremity swelling   Neurological:      General: No focal deficit present.      Mental Status: He is alert.   Psychiatric:         Mood and Affect: Mood normal.           Lines/Drains:        Telemetry:  Telemetry Orders (From admission, onward)               24 Hour Telemetry Monitoring  Continuous x 24 Hours (Telem)        Expiring   Question:  Reason for 24 Hour Telemetry  Answer:  Decompensated CHF- and any one of the following: continuous diuretic infusion or total diuretic dose >200 mg daily, associated electrolyte derangement (I.e. K < 3.0), inotropic drip (continuous infusion), hx of ventricular arrhythmia, or new EF < 35%                     Telemetry Reviewed: Normal Sinus Rhythm  Indication for Continued Telemetry Use: Metabolic/electrolyte disturbance with high probability of dysrhythmia               Lab Results: I have reviewed the following results:   Results from last 7 days   Lab Units 03/12/25  0538 03/11/25  2134   WBC Thousand/uL 5.91 5.26   HEMOGLOBIN g/dL 10.9* 11.0*   HEMATOCRIT % 33.3* 33.5*   PLATELETS Thousands/uL 204 208   SEGS PCT %  --  60   LYMPHO PCT %  --  22   MONO PCT %  --  10   EOS PCT %  --  6     Results from last 7 days   Lab Units 03/16/25  0616 03/12/25  0538 03/11/25  2134   SODIUM mmol/L 134*   < > 136   POTASSIUM mmol/L 3.2*   < > 4.1   CHLORIDE mmol/L 84*   < > 102   CO2 mmol/L 38*   < > 22   BUN mg/dL 42*   < > 18   CREATININE mg/dL 2.33*   < > 1.14   ANION GAP mmol/L 12   < > 12   CALCIUM mg/dL 7.6*   < > 7.2*   ALBUMIN g/dL  --   --  3.2*   TOTAL BILIRUBIN mg/dL  --   --  0.30   ALK PHOS U/L  --   --  83   ALT U/L  --   --  6*   AST U/L  --   --  10*   GLUCOSE RANDOM mg/dL 213*   < > 170*    < > = values in this interval not displayed.         Results from last 7 days   Lab Units 03/16/25  1115 03/16/25  0644 03/15/25  2148 03/15/25  1624 03/15/25  1103 03/15/25  0630 03/14/25  2221 03/14/25  1625 03/14/25  1055 03/14/25  0618 03/13/25  2139  03/13/25  1556   POC GLUCOSE mg/dl 285* 217* 225* 224* 211* 183* 215* 204* 205* 168* 211* 141*     Results from last 7 days   Lab Units 03/10/25  0906   HEMOGLOBIN A1C  6.3           Recent Cultures (last 7 days):         Imaging Results Review: I reviewed radiology reports from this admission including: chest xray.  Other Study Results Review: No additional pertinent studies reviewed.    Last 24 Hours Medication List:     Current Facility-Administered Medications:     acetaminophen (TYLENOL) tablet 650 mg, Q6H PRN    apixaban (ELIQUIS) tablet 5 mg, BID    atorvastatin (LIPITOR) tablet 10 mg, Daily    budesonide-formoterol (SYMBICORT) 160-4.5 mcg/act inhaler 2 puff, BID    bumetanide (BUMEX) 12.5 mg infusion 50 mL, Continuous, Last Rate: 0.5 mg/hr (03/16/25 1022)    cyanocobalamin (VITAMIN B-12) tablet 1,000 mcg, Daily    Empagliflozin (JARDIANCE) tablet 10 mg, Daily    famotidine (PEPCID) tablet 20 mg, BID PRN    fluticasone (FLONASE) 50 mcg/act nasal spray 1 spray, BID    insulin glargine (LANTUS) subcutaneous injection 8 Units 0.08 mL, QAM    insulin lispro (HumALOG/ADMELOG) 100 units/mL subcutaneous injection 2-12 Units, TID AC **AND** Fingerstick Glucose (POCT), TID AC    loratadine (CLARITIN) tablet 10 mg, Daily    magnesium Oxide (MAG-OX) tablet 400 mg, BID    methocarbamol (ROBAXIN) tablet 500 mg, Q6H PRN    metoprolol succinate (TOPROL-XL) 24 hr tablet 50 mg, Daily    montelukast (SINGULAIR) tablet 10 mg, HS    nystatin (MYCOSTATIN) powder, BID    potassium chloride (Klor-Con M20) CR tablet 40 mEq, TID With Meals    pregabalin (LYRICA) capsule 50 mg, HS    spironolactone (ALDACTONE) tablet 25 mg, Daily    Administrative Statements   Today, Patient Was Seen By: Rachele Pyle MD      **Please Note: This note may have been constructed using a voice recognition system.**

## 2025-03-16 NOTE — PROGRESS NOTES
Advanced Heart Failure / Pulmonary Hypertension Service Progress Note    Mesfin Cano 59 y.o. male   MRN: [id number]  Unit/Bed#: [facility] 730-01; Encounter: [id number]    Assessment:  Principal Problem:    CHF, acute on chronic (HCC)  Active Problems:    Paroxysmal atrial fibrillation (HCC)    Stage 3b chronic kidney disease (HCC)    Type 2 diabetes mellitus without complication, without long-term current use of insulin (HCC)      Subjective:   Patient seen and examined. No significant events overnight. States he is feeling better respiratory wise, but still not back to his baseline and LE edema not at baseline.     Objective:   Intake/ Output: 900/4300 (-3400)  Weight: 331--322--311--309 lbs  Telemetry: SR HR 80's    Today's Plan:  Reduce Bumex Gtt to 0.5 mg/hr--down 22 lbs since admit and surpassed goal weight of 315 lbs.  Kdur 40 TID.  Creat above BL but trending down. JVD improving.  Potential to decrease to IV push or PO diuretics tomorrow.   Replace K+ today.  Repeat BMP 1800.   Continue Toprol XL, Spironolactone, Jardiance. BP's stable.  Creatinine above BL  Repeat BMP in a.m    Plan:  Acute on Chronic HFpEF, LVEF 55%  Last echo 6/2024 which showed concentric LV hypertrophy with EF 55%, normal systolic functoin. LA moderately dilated. Akinetic basal inferolateral and mid inferolateral region.   Had CardioMEMS device implanted 3/2022, most recent readings: 3/11 16 > 16> 14> 12> 15 > 15 -- of which are above goal (10-12)     Approximate 17 lb weight gain over past two weeks, with elevated BNP on admission at 319. CXR without evidence of vascular congestion or pulmonary edema. Was due for f/u HF outpatient 3/6 but did not show to the appointment. Frequent admissions every 1-2 months of recent. Has been taking metolazone 5mg daily since Monday 3/10, normally takes three times per week. Received x1 Bumex 2mg IV in ED before being started on Bumex gtt via admitting team.      --Home Diuretic: Bumex 6mg TID  "with metolazone 5mg TIW  --Inpt Diuretic: Bumex Gtt 1 mg/hr--> Bumex 0.5 mg/hr     Paroxysmal Atrial Fibrillation  AC Eliquis  Rate: BB     CKD3B (BL 1.5-2.0)  1.14 on admit--trending up with diuresis  1.99-->2.4-->2.3 today     DM2  Morbidly Obese- BMI 42.43        Vitals:   Blood pressure 132/76, pulse 90, temperature 97.9 °F (36.6 °C), resp. rate 18, height 6' 1\" (1.854 m), weight (!) 140 kg (309 lb 11.9 oz), SpO2 95%.    Body mass index is 40.87 kg/m².    I/O last 3 completed shifts:  In: 900 [P.O.:900]  Out: 7900 [Urine:7900]    Wt Readings from Last 10 Encounters:   25 (!) 140 kg (309 lb 11.9 oz)   03/10/25 (!) 151 kg (333 lb 3.2 oz)   25 (!) 143 kg (315 lb)   25 (!) 143 kg (315 lb 0.6 oz)   24 (!) 147 kg (323 lb 6.4 oz)   24 (!) 143 kg (314 lb 9.6 oz)   10/29/24 (!) 145 kg (319 lb)   10/10/24 (!) 145 kg (319 lb)   10/03/24 (!) 150 kg (331 lb 11.2 oz)   10/01/24 (!) 147 kg (324 lb)     Vitals:    03/15/25 2009 03/15/25 2111 25 0600 25 0750   BP: 110/63 125/71  132/76   BP Location:       Pulse: 77 78  90   Resp:    18   Temp: 97.9 °F (36.6 °C) 97.8 °F (36.6 °C)  97.9 °F (36.6 °C)   TempSrc:       SpO2: 96% 96%  95%   Weight:   (!) 140 kg (309 lb 11.9 oz)    Height:           Physical Exam  Constitutional:       Appearance: He is well-developed. He is obese.   Neck:      Vascular: JVD present.      Comments: improving  Cardiovascular:      Rate and Rhythm: Normal rate and regular rhythm.      Heart sounds: Normal heart sounds, S1 normal and S2 normal.   Pulmonary:      Effort: No respiratory distress.      Breath sounds: Normal air entry. Decreased breath sounds present.      Comments: Pt reports TELLES while walking hallways, but is improving daily  Musculoskeletal:      Right lower le+ Edema present.      Left lower le+ Edema present.   Skin:     General: Skin is warm and dry.   Neurological:      Mental Status: He is alert and oriented to person, place, and " time. Mental status is at baseline.   Psychiatric:         Behavior: Behavior is cooperative.         Cognition and Memory: Cognition normal.             Current Facility-Administered Medications:     acetaminophen (TYLENOL) tablet 650 mg, 650 mg, Oral, Q6H PRN, Justino Prasad DO    apixaban (ELIQUIS) tablet 5 mg, 5 mg, Oral, BID, Justino Prasad DO, 5 mg at 03/16/25 0815    atorvastatin (LIPITOR) tablet 10 mg, 10 mg, Oral, Daily, Justino Prasad DO, 10 mg at 03/16/25 0815    budesonide-formoterol (SYMBICORT) 160-4.5 mcg/act inhaler 2 puff, 2 puff, Inhalation, BID, Justino Prasad DO, 2 puff at 03/16/25 0809    bumetanide (BUMEX) 12.5 mg infusion 50 mL, 1 mg/hr, Intravenous, Continuous, [facility] DO Justino, Last Rate: 4 mL/hr at 03/15/25 2242, 1 mg/hr at 03/15/25 2242    cyanocobalamin (VITAMIN B-12) tablet 1,000 mcg, 1,000 mcg, Oral, Daily, Justino Prasad DO, 1,000 mcg at 03/16/25 0815    Empagliflozin (JARDIANCE) tablet 10 mg, 10 mg, Oral, Daily, Justino Prasad DO, 10 mg at 03/16/25 0819    famotidine (PEPCID) tablet 20 mg, 20 mg, Oral, BID PRN, Justino Prasad DO    fluticasone (FLONASE) 50 mcg/act nasal spray 1 spray, 1 spray, Nasal, BID, Justino Prasad DO, 1 spray at 03/16/25 0809    insulin glargine (LANTUS) subcutaneous injection 8 Units 0.08 mL, 8 Units, Subcutaneous, QAMRachele MD, 8 Units at 03/16/25 0825    insulin lispro (HumALOG/ADMELOG) 100 units/mL subcutaneous injection 1-5 Units, 1-5 Units, Subcutaneous, TID AC, 1 Units at 03/16/25 0817 **AND** Fingerstick Glucose (POCT), , , TID AC, Justino A Prasad, DO    insulin lispro (HumALOG/ADMELOG) 100 units/mL subcutaneous injection 1-5 Units, 1-5 Units, Subcutaneous, HS, Justino Prasad DO, 2 Units at 03/15/25 2243    loratadine (CLARITIN) tablet 10 mg, 10 mg, Oral, Daily, Justino Prasad DO, 10 mg at 03/16/25 0815    magnesium Oxide (MAG-OX) tablet 400 mg, 400 mg, Oral, BID, Mary Naik PA-C, 400 mg at 03/16/25 0815    methocarbamol (ROBAXIN)  tablet 500 mg, 500 mg, Oral, Q6H PRN, Rachele Pyle MD, 500 mg at 03/15/25 1534    metoprolol succinate (TOPROL-XL) 24 hr tablet 50 mg, 50 mg, Oral, Daily, Justino Prasad DO, 50 mg at 03/16/25 0815    montelukast (SINGULAIR) tablet 10 mg, 10 mg, Oral, HS, Justino Prasad DO, 10 mg at 03/15/25 2243    nystatin (MYCOSTATIN) powder, , Topical, BID, Rachele Pyle MD, Given at 03/16/25 0809    potassium chloride (Klor-Con M20) CR tablet 40 mEq, 40 mEq, Oral, TID With Meals, Rachele Pyle MD, 40 mEq at 03/16/25 0815    pregabalin (LYRICA) capsule 50 mg, 50 mg, Oral, HS, Justino Prasad DO, 50 mg at 03/15/25 2243    spironolactone (ALDACTONE) tablet 25 mg, 25 mg, Oral, Daily, Justino Prasad DO, 25 mg at 03/16/25 0815    Labs & Results:      Results from last 7 days   Lab Units 03/12/25  0538 03/11/25  2134   WBC Thousand/uL 5.91 5.26   HEMOGLOBIN g/dL 10.9* 11.0*   HEMATOCRIT % 33.3* 33.5*   PLATELETS Thousands/uL 204 208         Results from last 7 days   Lab Units 03/16/25  0616 03/15/25  1828 03/15/25  0922 03/12/25  0538 03/11/25  2134   POTASSIUM mmol/L 3.2* 3.6 3.3*   < > 4.1   CHLORIDE mmol/L 84* 82* 83*   < > 102   CO2 mmol/L 38* 34* 37*   < > 22   BUN mg/dL 42* 38* 36*   < > 18   CREATININE mg/dL 2.33* 2.42* 1.99*   < > 1.14   CALCIUM mg/dL 7.6* 8.3* 7.8*   < > 7.2*   ALK PHOS U/L  --   --   --   --  83   ALT U/L  --   --   --   --  6*   AST U/L  --   --   --   --  10*    < > = values in this interval not displayed.             Thank you for the opportunity to participate in the care of this patient.    RODRIGUE Flynn  Advanced Heart Failure  [facility]

## 2025-03-16 NOTE — ASSESSMENT & PLAN NOTE
Wt Readings from Last 3 Encounters:   03/16/25 (!) 140 kg (309 lb 11.9 oz)   03/10/25 (!) 151 kg (333 lb 3.2 oz)   01/31/25 (!) 143 kg (315 lb)     Presented to the ED with lower extremity swelling and shortness of breath, gained about 18 pounds from his dry weight  Home regimen is Bumex 6 mg 3 times daily  Currently on Bumex drip, s/p metolazone 3/13 and 3/14  Continue metoprolol, spironolactone, Jardiance  Bumex drip decreased from 1 mg/h to 0.5 mg/h  Continue potassium chloride 40 mEq 3 times daily  Recheck BMP at 6 PM  Cardiology on board, appreciate recommendations  Daily weights, intake and output, fluid restriction, low-salt diet

## 2025-03-16 NOTE — PLAN OF CARE
Problem: PAIN - ADULT  Goal: Verbalizes/displays adequate comfort level or baseline comfort level  Description: Interventions:  - Encourage patient to monitor pain and request assistance  - Assess pain using appropriate pain scale  - Administer analgesics based on type and severity of pain and evaluate response  - Implement non-pharmacological measures as appropriate and evaluate response  - Consider cultural and social influences on pain and pain management  - Notify physician/advanced practitioner if interventions unsuccessful or patient reports new pain  Outcome: Progressing     Problem: INFECTION - ADULT  Goal: Absence or prevention of progression during hospitalization  Description: INTERVENTIONS:  - Assess and monitor for signs and symptoms of infection  - Monitor lab/diagnostic results  - Monitor all insertion sites, i.e. indwelling lines, tubes, and drains  - Monitor endotracheal if appropriate and nasal secretions for changes in amount and color  - [place] appropriate cooling/warming therapies per order  - Administer medications as ordered  - Instruct and encourage patient and family to use good hand hygiene technique  - Identify and instruct in appropriate isolation precautions for identified infection/condition  Outcome: Progressing  Goal: Absence of fever/infection during neutropenic period  Description: INTERVENTIONS:  - Monitor WBC    Outcome: Progressing     Problem: SAFETY ADULT  Goal: Patient will remain free of falls  Description: INTERVENTIONS:  - Educate patient/family on patient safety including physical limitations  - Instruct patient to call for assistance with activity   - Consult OT/PT to assist with strengthening/mobility   - Keep Call bell within reach  - Keep bed low and locked with side rails adjusted as appropriate  - Keep care items and personal belongings within reach  - Initiate and maintain comfort rounds  - Make Fall Risk Sign visible to staff  - Offer Toileting every 2 Hours,  in advance of need  - Initiate/Maintain bed alarm  - Apply yellow socks and bracelet for high fall risk patients  - Consider moving patient to room near nurses station  Outcome: Progressing  Goal: Maintain or return to baseline ADL function  Description: INTERVENTIONS:  -  Assess patient's ability to carry out ADLs; assess patient's baseline for ADL function and identify physical deficits which impact ability to perform ADLs (bathing, care of mouth/teeth, toileting, grooming, dressing, etc.)  - Assess/evaluate cause of self-care deficits   - Assess range of motion  - Assess patient's mobility; develop plan if impaired  - Assess patient's need for assistive devices and provide as appropriate  - Encourage maximum independence but intervene and supervise when necessary  - Involve family in performance of ADLs  - Assess for home care needs following discharge   - Consider OT consult to assist with ADL evaluation and planning for discharge  - Provide patient education as appropriate  Outcome: Progressing  Goal: Maintains/Returns to pre admission functional level  Description: INTERVENTIONS:  - Perform AM-PAC 6 Click Basic Mobility/ Daily Activity assessment daily.  - Set and communicate daily mobility goal to care team and patient/family/caregiver.   - Collaborate with rehabilitation services on mobility goals if consulted  - Perform Range of Motion 3 times a day.  - Reposition patient every 2 hours.  - Dangle patient 3 times a day  - Stand patient 2 times a day  - Ambulate patient 3 times a day  - Out of bed to chair 2 times a day   - Out of bed for meals 3 times a day  - Out of bed for toileting  - Record patient progress and toleration of activity level   Outcome: Progressing     Problem: DISCHARGE PLANNING  Goal: Discharge to home or other facility with appropriate resources  Description: INTERVENTIONS:  - Identify barriers to discharge w/patient and caregiver  - Arrange for needed discharge resources and  transportation as appropriate  - Identify discharge learning needs (meds, wound care, etc.)  - Arrange for interpretive services to assist at discharge as needed  - Refer to Case Management Department for coordinating discharge planning if the patient needs post-hospital services based on physician/advanced practitioner order or complex needs related to functional status, cognitive ability, or social support system  Outcome: Progressing     Problem: Knowledge Deficit  Goal: Patient/family/caregiver demonstrates understanding of disease process, treatment plan, medications, and discharge instructions  Description: Complete learning assessment and assess knowledge base.  Interventions:  - Provide teaching at level of understanding  - Provide teaching via preferred learning methods  Outcome: Progressing     Problem: CARDIOVASCULAR - ADULT  Goal: Maintains optimal cardiac output and hemodynamic stability  Description: INTERVENTIONS:  - Monitor I/O, vital signs and rhythm  - Monitor for S/S and trends of decreased cardiac output  - Administer and titrate ordered vasoactive medications to optimize hemodynamic stability  - Assess quality of pulses, skin color and temperature  - Assess for signs of decreased coronary artery perfusion  - Instruct patient to report change in severity of symptoms  Outcome: Progressing  Goal: Absence of cardiac dysrhythmias or at baseline rhythm  Description: INTERVENTIONS:  - Continuous cardiac monitoring, vital signs, obtain 12 lead EKG if ordered  - Administer antiarrhythmic and heart rate control medications as ordered  - Monitor electrolytes and administer replacement therapy as ordered  Outcome: Progressing     Problem: SKIN/TISSUE INTEGRITY - ADULT  Goal: Skin Integrity remains intact(Skin Breakdown Prevention)  Description: Assess:  -Perform Marcelo assessment every shift  -Clean and moisturize skin every shift  -Inspect skin when repositioning, toileting, and assisting with  ADLS  -Assess extremities for adequate circulation and sensation     Bed Management:  -Have minimal linens on bed & keep smooth, unwrinkled  -Change linens as needed when moist or perspiring    Toileting:  -Offer bedside commode  -Assess for incontinence    Activity:  -Mobilize patient 3 times a day  -Encourage activity and walks on unit  -Encourage or provide ROM exercises   -Turn and reposition patient every 2 Hours  -Use appropriate equipment to lift or move patient in bed  -Instruct/ Assist with weight shifting every 2 when out of bed in chair    Skin Care:  -Avoid use of baby powder, tape, friction and shearing, hot water or constrictive clothing  -Relieve pressure over bony prominences  -Do not massage red bony areas  Outcome: Progressing  Goal: Incision(s), wounds(s) or drain site(s) healing without S/S of infection  Description: INTERVENTIONS  - Assess and document dressing, incision, wound bed, drain sites and surrounding tissue  - Provide patient and family education  - Perform skin care/dressing  Outcome: Progressing  Goal: Pressure injury heals and does not worsen  Description: Interventions:  - Implement low air loss mattress or specialty surface (Criteria met)  - Apply silicone foam dressing  - Instruct/assist with weight shifting every 90 minutes when in chair   - Use special pressure reducing interventions  - Apply fecal or urinary incontinence containment device   - Perform passive or active ROM  - Turn and reposition patient & offload bony prominences every 2 hours   - Utilize friction reducing device or surface for transfers   - Consider nutrition services referral as needed  Outcome: Progressing

## 2025-03-16 NOTE — ASSESSMENT & PLAN NOTE
Lab Results   Component Value Date    HGBA1C 6.3 03/10/2025       Recent Labs     03/15/25  1624 03/15/25  2148 03/16/25  0644 03/16/25  1115   POCGLU 224* 225* 217* 285*       Blood Sugar Average: Last 72 hrs:  (P) [id number]  Start Lantus 8 units a.m.  Increase insulin sliding scale algorithm from 1 to 4  Monitor blood sugar

## 2025-03-16 NOTE — ASSESSMENT & PLAN NOTE
Lab Results   Component Value Date    EGFR 29 03/16/2025    EGFR 28 03/15/2025    EGFR 36 03/15/2025    CREATININE 2.33 (H) 03/16/2025    CREATININE 2.42 (H) 03/15/2025    CREATININE 1.99 (H) 03/15/2025     Baseline creatinine seems to be from 1.5-2.2  Creatinine slightly above baseline  Bumex drip was decreased from 1 mg/h to 0.5 mg/h today  Continue to monitor labs

## 2025-03-17 ENCOUNTER — PATIENT OUTREACH (OUTPATIENT)
Dept: CARDIOLOGY CLINIC | Facility: CLINIC | Age: 60
End: 2025-03-17

## 2025-03-17 DIAGNOSIS — Z91.199 NONADHERENCE TO MEDICAL TREATMENT: Primary | ICD-10-CM

## 2025-03-17 LAB
ANION GAP SERPL CALCULATED.3IONS-SCNC: 13 MMOL/L (ref 4–13)
BUN SERPL-MCNC: 48 MG/DL (ref 5–25)
CALCIUM SERPL-MCNC: 7.9 MG/DL (ref 8.4–10.2)
CHLORIDE SERPL-SCNC: 83 MMOL/L (ref 96–108)
CO2 SERPL-SCNC: 37 MMOL/L (ref 21–32)
CREAT SERPL-MCNC: 2.52 MG/DL (ref 0.6–1.3)
GFR SERPL CREATININE-BSD FRML MDRD: 27 ML/MIN/1.73SQ M
GLUCOSE SERPL-MCNC: 183 MG/DL (ref 65–140)
GLUCOSE SERPL-MCNC: 195 MG/DL (ref 65–140)
GLUCOSE SERPL-MCNC: 212 MG/DL (ref 65–140)
GLUCOSE SERPL-MCNC: 234 MG/DL (ref 65–140)
GLUCOSE SERPL-MCNC: 235 MG/DL (ref 65–140)
MAGNESIUM SERPL-MCNC: 2.7 MG/DL (ref 1.9–2.7)
POTASSIUM SERPL-SCNC: 3.3 MMOL/L (ref 3.5–5.3)
SODIUM SERPL-SCNC: 133 MMOL/L (ref 135–147)

## 2025-03-17 PROCEDURE — 82948 REAGENT STRIP/BLOOD GLUCOSE: CPT

## 2025-03-17 PROCEDURE — 99232 SBSQ HOSP IP/OBS MODERATE 35: CPT | Performed by: INTERNAL MEDICINE

## 2025-03-17 PROCEDURE — 80048 BASIC METABOLIC PNL TOTAL CA: CPT | Performed by: INTERNAL MEDICINE

## 2025-03-17 PROCEDURE — 83735 ASSAY OF MAGNESIUM: CPT | Performed by: INTERNAL MEDICINE

## 2025-03-17 RX ORDER — INSULIN GLARGINE 100 [IU]/ML
10 INJECTION, SOLUTION SUBCUTANEOUS EVERY MORNING
Status: DISCONTINUED | OUTPATIENT
Start: 2025-03-17 | End: 2025-03-19

## 2025-03-17 RX ORDER — BUMETANIDE 2 MG/1
6 TABLET ORAL 3 TIMES DAILY
Status: DISCONTINUED | OUTPATIENT
Start: 2025-03-18 | End: 2025-03-20 | Stop reason: HOSPADM

## 2025-03-17 RX ORDER — BUMETANIDE 2 MG/1
6 TABLET ORAL ONCE
Status: COMPLETED | OUTPATIENT
Start: 2025-03-17 | End: 2025-03-17

## 2025-03-17 RX ADMIN — MONTELUKAST 10 MG: 10 TABLET, FILM COATED ORAL at 20:13

## 2025-03-17 RX ADMIN — APIXABAN 5 MG: 5 TABLET, FILM COATED ORAL at 08:13

## 2025-03-17 RX ADMIN — BUDESONIDE AND FORMOTEROL FUMARATE DIHYDRATE 2 PUFF: 160; 4.5 AEROSOL RESPIRATORY (INHALATION) at 17:17

## 2025-03-17 RX ADMIN — METOPROLOL SUCCINATE 50 MG: 50 TABLET, EXTENDED RELEASE ORAL at 08:12

## 2025-03-17 RX ADMIN — CYANOCOBALAMIN TAB 500 MCG 1000 MCG: 500 TAB at 08:12

## 2025-03-17 RX ADMIN — POTASSIUM CHLORIDE 40 MEQ: 1500 TABLET, EXTENDED RELEASE ORAL at 08:12

## 2025-03-17 RX ADMIN — ATORVASTATIN CALCIUM 10 MG: 10 TABLET, FILM COATED ORAL at 08:12

## 2025-03-17 RX ADMIN — EMPAGLIFLOZIN 10 MG: 10 TABLET, FILM COATED ORAL at 08:15

## 2025-03-17 RX ADMIN — INSULIN LISPRO 2 UNITS: 100 INJECTION, SOLUTION INTRAVENOUS; SUBCUTANEOUS at 08:15

## 2025-03-17 RX ADMIN — POTASSIUM CHLORIDE 40 MEQ: 1500 TABLET, EXTENDED RELEASE ORAL at 12:22

## 2025-03-17 RX ADMIN — POTASSIUM CHLORIDE 40 MEQ: 1500 TABLET, EXTENDED RELEASE ORAL at 17:17

## 2025-03-17 RX ADMIN — LORATADINE 10 MG: 10 TABLET ORAL at 08:12

## 2025-03-17 RX ADMIN — SPIRONOLACTONE 25 MG: 25 TABLET, FILM COATED ORAL at 08:13

## 2025-03-17 RX ADMIN — MAGNESIUM OXIDE TAB 400 MG (241.3 MG ELEMENTAL MG) 400 MG: 400 (241.3 MG) TAB at 08:12

## 2025-03-17 RX ADMIN — INSULIN LISPRO 4 UNITS: 100 INJECTION, SOLUTION INTRAVENOUS; SUBCUTANEOUS at 12:25

## 2025-03-17 RX ADMIN — PREGABALIN 50 MG: 50 CAPSULE ORAL at 20:13

## 2025-03-17 RX ADMIN — BUMETANIDE 6 MG: 2 TABLET ORAL at 17:18

## 2025-03-17 RX ADMIN — FLUTICASONE PROPIONATE 1 SPRAY: 50 SPRAY, METERED NASAL at 17:17

## 2025-03-17 RX ADMIN — FLUTICASONE PROPIONATE 1 SPRAY: 50 SPRAY, METERED NASAL at 08:13

## 2025-03-17 RX ADMIN — APIXABAN 5 MG: 5 TABLET, FILM COATED ORAL at 17:18

## 2025-03-17 RX ADMIN — MAGNESIUM OXIDE TAB 400 MG (241.3 MG ELEMENTAL MG) 400 MG: 400 (241.3 MG) TAB at 17:17

## 2025-03-17 RX ADMIN — Medication 0.5 MG/HR: at 05:56

## 2025-03-17 RX ADMIN — METHOCARBAMOL 500 MG: 500 TABLET ORAL at 12:25

## 2025-03-17 RX ADMIN — INSULIN GLARGINE 10 UNITS: 100 INJECTION, SOLUTION SUBCUTANEOUS at 08:15

## 2025-03-17 RX ADMIN — INSULIN LISPRO 2 UNITS: 100 INJECTION, SOLUTION INTRAVENOUS; SUBCUTANEOUS at 17:19

## 2025-03-17 RX ADMIN — BUDESONIDE AND FORMOTEROL FUMARATE DIHYDRATE 2 PUFF: 160; 4.5 AEROSOL RESPIRATORY (INHALATION) at 08:13

## 2025-03-17 NOTE — PLAN OF CARE
Problem: PAIN - ADULT  Goal: Verbalizes/displays adequate comfort level or baseline comfort level  Description: Interventions:  - Encourage patient to monitor pain and request assistance  - Assess pain using appropriate pain scale  - Administer analgesics based on type and severity of pain and evaluate response  - Implement non-pharmacological measures as appropriate and evaluate response  - Consider cultural and social influences on pain and pain management  - Notify physician/advanced practitioner if interventions unsuccessful or patient reports new pain  Outcome: Progressing     Problem: INFECTION - ADULT  Goal: Absence or prevention of progression during hospitalization  Description: INTERVENTIONS:  - Assess and monitor for signs and symptoms of infection  - Monitor lab/diagnostic results  - Monitor all insertion sites, i.e. indwelling lines, tubes, and drains  - Monitor endotracheal if appropriate and nasal secretions for changes in amount and color  - [place] appropriate cooling/warming therapies per order  - Administer medications as ordered  - Instruct and encourage patient and family to use good hand hygiene technique  - Identify and instruct in appropriate isolation precautions for identified infection/condition  Outcome: Progressing  Goal: Absence of fever/infection during neutropenic period  Description: INTERVENTIONS:  - Monitor WBC    Outcome: Progressing     Problem: SAFETY ADULT  Goal: Patient will remain free of falls  Description: INTERVENTIONS:  - Educate patient/family on patient safety including physical limitations  - Instruct patient to call for assistance with activity   - Consult OT/PT to assist with strengthening/mobility   - Keep Call bell within reach  - Keep bed low and locked with side rails adjusted as appropriate  - Keep care items and personal belongings within reach  - Initiate and maintain comfort rounds  - Make Fall Risk Sign visible to staff  - Offer Toileting every 2 Hours,  in advance of need  - Initiate/Maintain bed alarm  - Apply yellow socks and bracelet for high fall risk patients  - Consider moving patient to room near nurses station  Outcome: Progressing  Goal: Maintain or return to baseline ADL function  Description: INTERVENTIONS:  -  Assess patient's ability to carry out ADLs; assess patient's baseline for ADL function and identify physical deficits which impact ability to perform ADLs (bathing, care of mouth/teeth, toileting, grooming, dressing, etc.)  - Assess/evaluate cause of self-care deficits   - Assess range of motion  - Assess patient's mobility; develop plan if impaired  - Assess patient's need for assistive devices and provide as appropriate  - Encourage maximum independence but intervene and supervise when necessary  - Involve family in performance of ADLs  - Assess for home care needs following discharge   - Consider OT consult to assist with ADL evaluation and planning for discharge  - Provide patient education as appropriate  Outcome: Progressing  Goal: Maintains/Returns to pre admission functional level  Description: INTERVENTIONS:  - Perform AM-PAC 6 Click Basic Mobility/ Daily Activity assessment daily.  - Set and communicate daily mobility goal to care team and patient/family/caregiver.   - Collaborate with rehabilitation services on mobility goals if consulted  - Perform Range of Motion 3 times a day.  - Reposition patient every 2 hours.  - Dangle patient 3 times a day  - Stand patient 2 times a day  - Ambulate patient 3 times a day  - Out of bed to chair 2 times a day   - Out of bed for meals 3 times a day  - Out of bed for toileting  - Record patient progress and toleration of activity level   Outcome: Progressing     Problem: DISCHARGE PLANNING  Goal: Discharge to home or other facility with appropriate resources  Description: INTERVENTIONS:  - Identify barriers to discharge w/patient and caregiver  - Arrange for needed discharge resources and  transportation as appropriate  - Identify discharge learning needs (meds, wound care, etc.)  - Arrange for interpretive services to assist at discharge as needed  - Refer to Case Management Department for coordinating discharge planning if the patient needs post-hospital services based on physician/advanced practitioner order or complex needs related to functional status, cognitive ability, or social support system  Outcome: Progressing     Problem: Knowledge Deficit  Goal: Patient/family/caregiver demonstrates understanding of disease process, treatment plan, medications, and discharge instructions  Description: Complete learning assessment and assess knowledge base.  Interventions:  - Provide teaching at level of understanding  - Provide teaching via preferred learning methods  Outcome: Progressing     Problem: CARDIOVASCULAR - ADULT  Goal: Maintains optimal cardiac output and hemodynamic stability  Description: INTERVENTIONS:  - Monitor I/O, vital signs and rhythm  - Monitor for S/S and trends of decreased cardiac output  - Administer and titrate ordered vasoactive medications to optimize hemodynamic stability  - Assess quality of pulses, skin color and temperature  - Assess for signs of decreased coronary artery perfusion  - Instruct patient to report change in severity of symptoms  Outcome: Progressing  Goal: Absence of cardiac dysrhythmias or at baseline rhythm  Description: INTERVENTIONS:  - Continuous cardiac monitoring, vital signs, obtain 12 lead EKG if ordered  - Administer antiarrhythmic and heart rate control medications as ordered  - Monitor electrolytes and administer replacement therapy as ordered  Outcome: Progressing     Problem: SKIN/TISSUE INTEGRITY - ADULT  Goal: Skin Integrity remains intact(Skin Breakdown Prevention)  Description: Assess:  -Perform Marcelo assessment every shift  -Clean and moisturize skin every shift  -Inspect skin when repositioning, toileting, and assisting with  ADLS  -Assess extremities for adequate circulation and sensation     Bed Management:  -Have minimal linens on bed & keep smooth, unwrinkled  -Change linens as needed when moist or perspiring    Toileting:  -Offer bedside commode  -Assess for incontinence    Activity:  -Mobilize patient 3 times a day  -Encourage activity and walks on unit  -Encourage or provide ROM exercises   -Turn and reposition patient every 2 Hours  -Use appropriate equipment to lift or move patient in bed  -Instruct/ Assist with weight shifting every 2 when out of bed in chair    Skin Care:  -Avoid use of baby powder, tape, friction and shearing, hot water or constrictive clothing  -Relieve pressure over bony prominences  -Do not massage red bony areas  Outcome: Progressing  Goal: Incision(s), wounds(s) or drain site(s) healing without S/S of infection  Description: INTERVENTIONS  - Assess and document dressing, incision, wound bed, drain sites and surrounding tissue  - Provide patient and family education  - Perform skin care/dressing  Outcome: Progressing  Goal: Pressure injury heals and does not worsen  Description: Interventions:  - Implement low air loss mattress or specialty surface (Criteria met)  - Apply silicone foam dressing  - Instruct/assist with weight shifting every 90 minutes when in chair   - Use special pressure reducing interventions  - Apply fecal or urinary incontinence containment device   - Perform passive or active ROM  - Turn and reposition patient & offload bony prominences every 2 hours   - Utilize friction reducing device or surface for transfers   - Consider nutrition services referral as needed  Outcome: Progressing

## 2025-03-17 NOTE — ASSESSMENT & PLAN NOTE
Lab Results   Component Value Date    EGFR 27 03/17/2025    EGFR 29 03/16/2025    EGFR 29 03/16/2025    CREATININE 2.52 (H) 03/17/2025    CREATININE 2.38 (H) 03/16/2025    CREATININE 2.33 (H) 03/16/2025     Baseline creatinine seems to be from 1.5-2.2  Creatinine above baseline  Bumex drip was decreased from 1 mg/h to 0.5 mg/h on 3/16/2025  Continue to monitor labs, follow-up recommendations from heart failure today

## 2025-03-17 NOTE — RESTORATIVE TECHNICIAN NOTE
Restorative Technician Note      Patient Name: Mesfin Cano     Restorative Tech Visit Date: 03/17/25  Note Type: Mobility  Patient Position Upon Consult: Bedside chair  Activity Performed: Ambulated  Assistive Device: Other (Comment) (I.V. Pole)  Patient Position at End of Consult: Bedside chair; All needs within reach

## 2025-03-17 NOTE — ASSESSMENT & PLAN NOTE
Wt Readings from Last 3 Encounters:   03/17/25 (!) 139 kg (306 lb 14.1 oz)   03/10/25 (!) 151 kg (333 lb 3.2 oz)   01/31/25 (!) 143 kg (315 lb)     Patient with history of HFpEF, Stage C, NYHA III  Last echo 6/2024 which showed concentric LV hypertrophy with EF 55%, normal systolic functoin. LA moderately dilated. Akinetic basal inferolateral and mid inferolateral region.   Had CardioMEMS device implanted 3/2022, most recent readings: dPA 3/11 16 > 16> 14> 12> 15 > 15 -- of which are above goal (10-12)    Approximate 17lb weight gain over past two weeks, with elevated BNP on admission at 319. CXR without evidence of vascular congestion or pulmonary edema. Was due for f/u HF outpatient 3/6 but did not show to the appointment. Frequent admissions every 1-2 months of recent. Had been taking metolazone 5mg daily since Monday 3/10, normally takes three times per week. Received x1 Bumex 2mg IV in ED before being started on Bumex gtt via admitting team.     GDMT:  --Beta Blocker: Metoprolol succinate 50 mg daily.    --ARNi / ACEi / ARB: None currently  --Aldosterone Antagonist: Spironolactone 25mg daily  --SGLT2 Inhibitor: Jardiance 10mg daily  --Home Diuretic: Bumex 6mg TID with metolazone 5mg TIW    Plan:  Continue metoprolol 50mg daily  Continue aldactone 25mg daily  Continue jardiance 10mg daily  S/p metolazone 2.5mg x1 3/13, 3/14  Currently on Bumex  0.5mg gtt, will discontinue today and transition to oral diuretics   Plan to give x1 PO 6mg Bumex this afternoon and then proceed with home TID dosing tomorrow pending oral diuretic response  BMP/mag -- follow electrolytes for repletion  Currently Kdur 40 mEq TID, MagOx 400 BID

## 2025-03-17 NOTE — PROGRESS NOTES
Patient listed on Advanced Heart Failure Census at [facility]. AMB Social Work Order has been entered for the purpose of chart review and rounds with the team. JOYA HUSAIN shared that pt did not show up for his most recent OP CARD appt and has hx of non-adherence with attending appointments.    Outpatient Advanced Heart Failure LCSW completed electronic chart review. Met with patient who reported that he does have his own car and he drives. Pt explained that he missed his most recent OP CARD appt because he attended his friend's  that day. LCSW offered condolences. Also offered grief support resources however pt declined. Pt said that he has support through his group of friends.   Pt denied other social work concerns or resources at this time.     LCSW plans to close referral once pt is discharged from the hospital setting if no outpatient social work needs are identified by that time.

## 2025-03-17 NOTE — ASSESSMENT & PLAN NOTE
Lab Results   Component Value Date    HGBA1C 6.3 03/10/2025       Recent Labs     03/16/25  1115 03/16/25  1619 03/17/25  0640 03/17/25  1019   POCGLU 285* 241* 195* 234*       Blood Sugar Average: Last 72 hrs:  (P) [id number]    Controlled but is risk factor. Management per primary team.

## 2025-03-17 NOTE — PROGRESS NOTES
Progress Note - Heart Failure   Name: Mesfin Cano 59 y.o. male I MRN: [id number]  Unit/Bed#: [facility] 510-01 I Date of Admission: 3/11/2025   Date of Service: 3/17/2025 I Hospital Day: 6    Assessment & Plan  CHF, acute on chronic (HCC)  Wt Readings from Last 3 Encounters:   03/17/25 (!) 139 kg (306 lb 14.1 oz)   03/10/25 (!) 151 kg (333 lb 3.2 oz)   01/31/25 (!) 143 kg (315 lb)     Patient with history of HFpEF, Stage C, NYHA III  Last echo 6/2024 which showed concentric LV hypertrophy with EF 55%, normal systolic functoin. LA moderately dilated. Akinetic basal inferolateral and mid inferolateral region.   Had CardioMEMS device implanted 3/2022, most recent readings: dPA 3/11 16 > 16> 14> 12> 15 > 15 -- of which are above goal (10-12)    Approximate 17lb weight gain over past two weeks, with elevated BNP on admission at 319. CXR without evidence of vascular congestion or pulmonary edema. Was due for f/u HF outpatient 3/6 but did not show to the appointment. Frequent admissions every 1-2 months of recent. Had been taking metolazone 5mg daily since Monday 3/10, normally takes three times per week. Received x1 Bumex 2mg IV in ED before being started on Bumex gtt via admitting team.     GDMT:  --Beta Blocker: Metoprolol succinate 50 mg daily.    --ARNi / ACEi / ARB: None currently  --Aldosterone Antagonist: Spironolactone 25mg daily  --SGLT2 Inhibitor: Jardiance 10mg daily  --Home Diuretic: Bumex 6mg TID with metolazone 5mg TIW    Plan:  Continue metoprolol 50mg daily  Continue aldactone 25mg daily  Continue jardiance 10mg daily  S/p metolazone 2.5mg x1 3/13, 3/14  Currently on Bumex  0.5mg gtt, will discontinue today and transition to oral diuretics   Plan to give x1 PO 6mg Bumex this afternoon and then proceed with home TID dosing tomorrow pending oral diuretic response  BMP/mag -- follow electrolytes for repletion  Currently Kdur 40 mEq TID, MagOx 400 BID    Paroxysmal atrial fibrillation (HCC)  [id number] score of 3  (HTN, HF, DM)  BB, eliquis at home    Plan:  Continue beta blocker as above  Continue Eliquis   Stage 3b chronic kidney disease (HCC)  Lab Results   Component Value Date    EGFR 27 03/17/2025    EGFR 29 03/16/2025    EGFR 29 03/16/2025    CREATININE 2.52 (H) 03/17/2025    CREATININE 2.38 (H) 03/16/2025    CREATININE 2.33 (H) 03/16/2025     Baseline has historically been 1.5-2  Has been uptrending in the setting of IV diuresis. Continue to monitor upon transition to oral regimen.  Type 2 diabetes mellitus without complication, without long-term current use of insulin ([facility])  Lab Results   Component Value Date    HGBA1C 6.3 03/10/2025       Recent Labs     03/16/25  1115 03/16/25  1619 03/17/25  0640 03/17/25  1019   POCGLU 285* 241* 195* 234*       Blood Sugar Average: Last 72 hrs:  (P) [id number]    Controlled but is risk factor. Management per primary team.     Subjective   Chief Complaint: shortness of breath, weight gain    Patient feels significantly better compared to when he first came in to the hospital. Still having some SOB after walking around the hallway, but this is minimal compared to initial onset of symptoms. No chest pain, palpitations, dizziness.      Objective :  Temp:  [97.8 °F (36.6 °C)-98.6 °F (37 °C)] 97.8 °F (36.6 °C)  HR:  [76-93] 76  BP: (111-149)/(69-90) 136/77  Resp:  [17-20] 20  SpO2:  [95 %-97 %] 97 %  Orthostatic Blood Pressures      Flowsheet Row Most Recent Value   Blood Pressure 136/77 filed at 03/17/2025 1031   Patient Position - Orthostatic VS Sitting filed at 03/17/2025 0716          First Weight: Weight - Scale: (!) 150 kg (331 lb 5.6 oz) (03/12/25 1045)  Vitals:    03/16/25 0600 03/17/25 0500   Weight: (!) 140 kg (309 lb 11.9 oz) (!) 139 kg (306 lb 14.1 oz)     Physical Exam  Vitals and nursing note reviewed.   Constitutional:       General: He is not in acute distress.     Appearance: He is well-developed.   HENT:      Head: Normocephalic and atraumatic.   Eyes:       Conjunctiva/sclera: Conjunctivae normal.   Neck:      Comments: No appreciable JVD  Cardiovascular:      Rate and Rhythm: Normal rate and regular rhythm.      Heart sounds: Normal heart sounds. No murmur heard.  Pulmonary:      Effort: Pulmonary effort is normal. No respiratory distress.      Breath sounds: Normal breath sounds. No wheezing, rhonchi or rales.   Abdominal:      Palpations: Abdomen is soft.      Tenderness: There is no abdominal tenderness.   Musculoskeletal:      Cervical back: Neck supple.      Right lower leg: Edema (non-pitting) present.      Left lower leg: Edema (non-pitting) present.   Skin:     General: Skin is warm and dry.      Capillary Refill: Capillary refill takes less than 2 seconds.   Neurological:      General: No focal deficit present.      Mental Status: He is alert and oriented to person, place, and time.   Psychiatric:         Mood and Affect: Mood normal.           Lab Results: I have reviewed the following results:  Results from last 7 days   Lab Units 03/12/25  0538 03/11/25  2134   WBC Thousand/uL 5.91 5.26   HEMOGLOBIN g/dL 10.9* 11.0*   HEMATOCRIT % 33.3* 33.5*   PLATELETS Thousands/uL 204 208     Results from last 7 days   Lab Units 03/17/25  0605 03/16/25  1633 03/16/25  0616   POTASSIUM mmol/L 3.3* 3.5 3.2*   CHLORIDE mmol/L 83* 82* 84*   CO2 mmol/L 37* 36* 38*   BUN mg/dL 48* 46* 42*   CREATININE mg/dL 2.52* 2.38* 2.33*   CALCIUM mg/dL 7.9* 8.1* 7.6*         Lab Results   Component Value Date    HGBA1C 6.3 03/10/2025     Lab Results   Component Value Date    TROPONINI <0.02 09/22/2021     VTE Pharmacologic Prophylaxis: Eliquis  VTE Mechanical Prophylaxis: sequential compression device    Ilda Williamson DO  [facility]  Internal Medicine Residency, PGY-2

## 2025-03-17 NOTE — ASSESSMENT & PLAN NOTE
[id number] score of 3 (HTN, HF, DM)  BB, eliquis at home    Plan:  Continue beta blocker as above  Continue Eliquis

## 2025-03-17 NOTE — ASSESSMENT & PLAN NOTE
Lab Results   Component Value Date    EGFR 27 03/17/2025    EGFR 29 03/16/2025    EGFR 29 03/16/2025    CREATININE 2.52 (H) 03/17/2025    CREATININE 2.38 (H) 03/16/2025    CREATININE 2.33 (H) 03/16/2025     Baseline has historically been 1.5-2  Has been uptrending in the setting of IV diuresis. Continue to monitor upon transition to oral regimen.

## 2025-03-17 NOTE — PROGRESS NOTES
Progress Note - Hospitalist   Name: Mesfin Cano 59 y.o. male I MRN: [id number]  Unit/Bed#: [facility] 510-01 I Date of Admission: 3/11/2025   Date of Service: 3/17/2025 I Hospital Day: 6    Assessment & Plan  CHF, acute on chronic (HCC)  Wt Readings from Last 3 Encounters:   03/17/25 (!) 139 kg (306 lb 14.1 oz)   03/10/25 (!) 151 kg (333 lb 3.2 oz)   01/31/25 (!) 143 kg (315 lb)     Presented to the ED with lower extremity swelling and shortness of breath, gained about 18 pounds from his dry weight  Home regimen is Bumex 6 mg 3 times daily  Started on Bumex drip, s/p metolazone 3/13 and 3/14  Continue metoprolol, spironolactone, Jardiance  Bumex drip decreased from 1 mg/h to 0.5 mg/h on 3/16  Lost over 20 lbs  Potassium 3.3 today, continue potassium chloride 40 mEq 3 times daily  Bump in creatinine today, follow-up heart failure recommendations  Daily weights, intake and output, fluid restriction, low-salt diet  Paroxysmal atrial fibrillation (HCC)  Continue metoprolol succinate 50 mg daily  Continue Eliquis 5 mg twice daily    Stage 3b chronic kidney disease (HCC)  Lab Results   Component Value Date    EGFR 27 03/17/2025    EGFR 29 03/16/2025    EGFR 29 03/16/2025    CREATININE 2.52 (H) 03/17/2025    CREATININE 2.38 (H) 03/16/2025    CREATININE 2.33 (H) 03/16/2025     Baseline creatinine seems to be from 1.5-2.2  Creatinine above baseline  Bumex drip was decreased from 1 mg/h to 0.5 mg/h on 3/16/2025  Continue to monitor labs, follow-up recommendations from heart failure today  Type 2 diabetes mellitus without complication, without long-term current use of insulin ([facility])  Lab Results   Component Value Date    HGBA1C 6.3 03/10/2025       Recent Labs     03/16/25  0644 03/16/25  1115 03/16/25  1619 03/17/25  0640   POCGLU 217* 285* 241* 195*       Blood Sugar Average: Last 72 hrs:  (P) 214.3665103528114328  Increase Lantus from 8 to 10 units a.m., insulin sliding skim algorithm was increased from 124 on 3/16/2025  Continue  Jardiance 10 mg daily  Further adjust insulin if needed  Monitor blood sugar    VTE Pharmacologic Prophylaxis: VTE Score: 3 Moderate Risk (Score 3-4) - Pharmacological DVT Prophylaxis Ordered: apixaban (Eliquis).    Mobility:   Basic Mobility Inpatient Raw Score: 24  JH-HLM Goal: 8: Walk 250 feet or more  JH-HLM Achieved: 8: Walk 250 feet ot more  JH-HLM Goal achieved. Continue to encourage appropriate mobility.    Patient Centered Rounds:  discussed with nurinsg    Discussions with Specialists or Other Care Team Provider: nursing, cm    Education and Discussions with Family / Patient: Patient declined call to .     Current Length of Stay: 6 day(s)  Current Patient Status: Inpatient   Certification Statement: The patient will continue to require additional inpatient hospital stay due to heart failure, still on Bumex drip  Discharge Plan: Anticipate discharge in 48-72 hrs to home.    Code Status: Level 1 - Full Code    Subjective   Patient was seen evaluated bedside, feeling well, still urinating a lot    Objective :  Temp:  [97.8 °F (36.6 °C)-98.6 °F (37 °C)] 98.6 °F (37 °C)  HR:  [79-93] 93  BP: (111-149)/(69-90) 136/78  Resp:  [17-19] 18  SpO2:  [95 %-97 %] 95 %    Body mass index is 40.49 kg/m².     Input and Output Summary (last 24 hours):     Intake/Output Summary (Last 24 hours) at 3/17/2025 0953  Last data filed at 3/17/2025 0817  Gross per 24 hour   Intake 1411.43 ml   Output 4200 ml   Net -2788.57 ml       Physical Exam  Constitutional:       General: He is not in acute distress.  HENT:      Head: Atraumatic.   Cardiovascular:      Rate and Rhythm: Normal rate and regular rhythm.      Heart sounds: No murmur heard.  Pulmonary:      Effort: Pulmonary effort is normal. No respiratory distress.      Breath sounds: Normal breath sounds. No wheezing.   Abdominal:      General: Bowel sounds are normal. There is no distension.      Palpations: Abdomen is soft.      Tenderness: There is no abdominal  tenderness.   Musculoskeletal:      Cervical back: Neck supple.      Comments: Bilateral lower extremity nonpitting edema   Skin:     General: Skin is warm and dry.   Neurological:      General: No focal deficit present.      Mental Status: He is alert.   Psychiatric:         Mood and Affect: Mood normal.           Lines/Drains:        Telemetry:  Telemetry Orders (From admission, onward)               24 Hour Telemetry Monitoring  Continuous x 24 Hours (Telem)        Expiring   Question:  Reason for 24 Hour Telemetry  Answer:  Decompensated CHF- and any one of the following: continuous diuretic infusion or total diuretic dose >200 mg daily, associated electrolyte derangement (I.e. K < 3.0), inotropic drip (continuous infusion), hx of ventricular arrhythmia, or new EF < 35%                     Telemetry Reviewed: Normal Sinus Rhythm  Indication for Continued Telemetry Use: Acute CHF on >200 mg lasix/day or equivalent dose or with new reduced EF.                Lab Results: I have reviewed the following results:   Results from last 7 days   Lab Units 03/12/25  0538 03/11/25  2134   WBC Thousand/uL 5.91 5.26   HEMOGLOBIN g/dL 10.9* 11.0*   HEMATOCRIT % 33.3* 33.5*   PLATELETS Thousands/uL 204 208   SEGS PCT %  --  60   LYMPHO PCT %  --  22   MONO PCT %  --  10   EOS PCT %  --  6     Results from last 7 days   Lab Units 03/17/25  0605 03/12/25  0538 03/11/25  2134   SODIUM mmol/L 133*   < > 136   POTASSIUM mmol/L 3.3*   < > 4.1   CHLORIDE mmol/L 83*   < > 102   CO2 mmol/L 37*   < > 22   BUN mg/dL 48*   < > 18   CREATININE mg/dL 2.52*   < > 1.14   ANION GAP mmol/L 13   < > 12   CALCIUM mg/dL 7.9*   < > 7.2*   ALBUMIN g/dL  --   --  3.2*   TOTAL BILIRUBIN mg/dL  --   --  0.30   ALK PHOS U/L  --   --  83   ALT U/L  --   --  6*   AST U/L  --   --  10*   GLUCOSE RANDOM mg/dL 212*   < > 170*    < > = values in this interval not displayed.         Results from last 7 days   Lab Units 03/17/25  0640 03/16/25  0761  03/16/25  1115 03/16/25  0644 03/15/25  2148 03/15/25  1624 03/15/25  1103 03/15/25  0630 03/14/25  2221 03/14/25  1625 03/14/25  1055 03/14/25  0618   POC GLUCOSE mg/dl 195* 241* 285* 217* 225* 224* 211* 183* 215* 204* 205* 168*               Recent Cultures (last 7 days):         Imaging Results Review: I reviewed radiology reports from this admission including: chest xray.  Other Study Results Review: No additional pertinent studies reviewed.    Last 24 Hours Medication List:     Current Facility-Administered Medications:     acetaminophen (TYLENOL) tablet 650 mg, Q6H PRN    apixaban (ELIQUIS) tablet 5 mg, BID    atorvastatin (LIPITOR) tablet 10 mg, Daily    budesonide-formoterol (SYMBICORT) 160-4.5 mcg/act inhaler 2 puff, BID    bumetanide (BUMEX) 12.5 mg infusion 50 mL, Continuous, Last Rate: 0.5 mg/hr (03/17/25 0556)    cyanocobalamin (VITAMIN B-12) tablet 1,000 mcg, Daily    Empagliflozin (JARDIANCE) tablet 10 mg, Daily    famotidine (PEPCID) tablet 20 mg, BID PRN    fluticasone (FLONASE) 50 mcg/act nasal spray 1 spray, BID    insulin glargine (LANTUS) subcutaneous injection 10 Units 0.1 mL, QAM    insulin lispro (HumALOG/ADMELOG) 100 units/mL subcutaneous injection 2-12 Units, TID AC **AND** Fingerstick Glucose (POCT), TID AC    loratadine (CLARITIN) tablet 10 mg, Daily    magnesium Oxide (MAG-OX) tablet 400 mg, BID    methocarbamol (ROBAXIN) tablet 500 mg, Q6H PRN    metoprolol succinate (TOPROL-XL) 24 hr tablet 50 mg, Daily    montelukast (SINGULAIR) tablet 10 mg, HS    nystatin (MYCOSTATIN) powder, BID    potassium chloride (Klor-Con M20) CR tablet 40 mEq, TID With Meals    pregabalin (LYRICA) capsule 50 mg, HS    spironolactone (ALDACTONE) tablet 25 mg, Daily    Administrative Statements   Today, Patient Was Seen By: Rachele Mladenovic, MD      **Please Note: This note may have been constructed using a voice recognition system.**

## 2025-03-17 NOTE — ASSESSMENT & PLAN NOTE
Wt Readings from Last 3 Encounters:   03/17/25 (!) 139 kg (306 lb 14.1 oz)   03/10/25 (!) 151 kg (333 lb 3.2 oz)   01/31/25 (!) 143 kg (315 lb)     Presented to the ED with lower extremity swelling and shortness of breath, gained about 18 pounds from his dry weight  Home regimen is Bumex 6 mg 3 times daily  Started on Bumex drip, s/p metolazone 3/13 and 3/14  Continue metoprolol, spironolactone, Jardiance  Bumex drip decreased from 1 mg/h to 0.5 mg/h on 3/16  Lost over 20 lbs  Potassium 3.3 today, continue potassium chloride 40 mEq 3 times daily  Bump in creatinine today, follow-up heart failure recommendations  Daily weights, intake and output, fluid restriction, low-salt diet

## 2025-03-17 NOTE — ASSESSMENT & PLAN NOTE
Lab Results   Component Value Date    HGBA1C 6.3 03/10/2025       Recent Labs     03/16/25  0644 03/16/25  1115 03/16/25  1619 03/17/25  0640   POCGLU 217* 285* 241* 195*       Blood Sugar Average: Last 72 hrs:  (P) 214.1503643225075152  Increase Lantus from 8 to 10 units a.m., insulin sliding skim algorithm was increased from 124 on 3/16/2025  Continue Jardiance 10 mg daily  Further adjust insulin if needed  Monitor blood sugar

## 2025-03-18 PROBLEM — N17.9 ACUTE RENAL FAILURE SUPERIMPOSED ON STAGE 3B CHRONIC KIDNEY DISEASE (HCC): Status: ACTIVE | Noted: 2022-09-16

## 2025-03-18 LAB
ANION GAP SERPL CALCULATED.3IONS-SCNC: 14 MMOL/L (ref 4–13)
BUN SERPL-MCNC: 55 MG/DL (ref 5–25)
CALCIUM SERPL-MCNC: 7.5 MG/DL (ref 8.4–10.2)
CHLORIDE SERPL-SCNC: 84 MMOL/L (ref 96–108)
CO2 SERPL-SCNC: 33 MMOL/L (ref 21–32)
CREAT SERPL-MCNC: 2.59 MG/DL (ref 0.6–1.3)
ERYTHROCYTE [DISTWIDTH] IN BLOOD BY AUTOMATED COUNT: 15.7 % (ref 11.6–15.1)
GFR SERPL CREATININE-BSD FRML MDRD: 26 ML/MIN/1.73SQ M
GLUCOSE SERPL-MCNC: 197 MG/DL (ref 65–140)
GLUCOSE SERPL-MCNC: 206 MG/DL (ref 65–140)
GLUCOSE SERPL-MCNC: 237 MG/DL (ref 65–140)
GLUCOSE SERPL-MCNC: 246 MG/DL (ref 65–140)
GLUCOSE SERPL-MCNC: 274 MG/DL (ref 65–140)
HCT VFR BLD AUTO: 34.9 % (ref 36.5–49.3)
HGB BLD-MCNC: 11.7 G/DL (ref 12–17)
MAGNESIUM SERPL-MCNC: 2.6 MG/DL (ref 1.9–2.7)
MCH RBC QN AUTO: 31 PG (ref 26.8–34.3)
MCHC RBC AUTO-ENTMCNC: 33.5 G/DL (ref 31.4–37.4)
MCV RBC AUTO: 93 FL (ref 82–98)
PLATELET # BLD AUTO: 249 THOUSANDS/UL (ref 149–390)
PMV BLD AUTO: 10.1 FL (ref 8.9–12.7)
POTASSIUM SERPL-SCNC: 3.4 MMOL/L (ref 3.5–5.3)
RBC # BLD AUTO: 3.77 MILLION/UL (ref 3.88–5.62)
SODIUM SERPL-SCNC: 131 MMOL/L (ref 135–147)
WBC # BLD AUTO: 8.49 THOUSAND/UL (ref 4.31–10.16)

## 2025-03-18 PROCEDURE — 99232 SBSQ HOSP IP/OBS MODERATE 35: CPT | Performed by: INTERNAL MEDICINE

## 2025-03-18 PROCEDURE — 85027 COMPLETE CBC AUTOMATED: CPT | Performed by: PHYSICIAN ASSISTANT

## 2025-03-18 PROCEDURE — 82948 REAGENT STRIP/BLOOD GLUCOSE: CPT

## 2025-03-18 PROCEDURE — 83735 ASSAY OF MAGNESIUM: CPT | Performed by: INTERNAL MEDICINE

## 2025-03-18 PROCEDURE — 99233 SBSQ HOSP IP/OBS HIGH 50: CPT | Performed by: STUDENT IN AN ORGANIZED HEALTH CARE EDUCATION/TRAINING PROGRAM

## 2025-03-18 PROCEDURE — 80048 BASIC METABOLIC PNL TOTAL CA: CPT | Performed by: INTERNAL MEDICINE

## 2025-03-18 RX ORDER — INSULIN LISPRO 100 [IU]/ML
5 INJECTION, SOLUTION INTRAVENOUS; SUBCUTANEOUS
Status: DISCONTINUED | OUTPATIENT
Start: 2025-03-18 | End: 2025-03-20 | Stop reason: HOSPADM

## 2025-03-18 RX ORDER — POTASSIUM CHLORIDE 1500 MG/1
40 TABLET, EXTENDED RELEASE ORAL ONCE
Status: COMPLETED | OUTPATIENT
Start: 2025-03-18 | End: 2025-03-18

## 2025-03-18 RX ADMIN — ATORVASTATIN CALCIUM 10 MG: 10 TABLET, FILM COATED ORAL at 08:41

## 2025-03-18 RX ADMIN — POTASSIUM CHLORIDE 40 MEQ: 1500 TABLET, EXTENDED RELEASE ORAL at 11:57

## 2025-03-18 RX ADMIN — INSULIN LISPRO 6 UNITS: 100 INJECTION, SOLUTION INTRAVENOUS; SUBCUTANEOUS at 07:13

## 2025-03-18 RX ADMIN — METOPROLOL SUCCINATE 50 MG: 50 TABLET, EXTENDED RELEASE ORAL at 08:43

## 2025-03-18 RX ADMIN — SPIRONOLACTONE 25 MG: 25 TABLET, FILM COATED ORAL at 08:43

## 2025-03-18 RX ADMIN — INSULIN LISPRO 5 UNITS: 100 INJECTION, SOLUTION INTRAVENOUS; SUBCUTANEOUS at 10:12

## 2025-03-18 RX ADMIN — BUMETANIDE 6 MG: 2 TABLET ORAL at 21:14

## 2025-03-18 RX ADMIN — INSULIN LISPRO 4 UNITS: 100 INJECTION, SOLUTION INTRAVENOUS; SUBCUTANEOUS at 11:57

## 2025-03-18 RX ADMIN — APIXABAN 5 MG: 5 TABLET, FILM COATED ORAL at 08:41

## 2025-03-18 RX ADMIN — APIXABAN 5 MG: 5 TABLET, FILM COATED ORAL at 17:42

## 2025-03-18 RX ADMIN — POTASSIUM CHLORIDE 40 MEQ: 1500 TABLET, EXTENDED RELEASE ORAL at 17:42

## 2025-03-18 RX ADMIN — INSULIN LISPRO 2 UNITS: 100 INJECTION, SOLUTION INTRAVENOUS; SUBCUTANEOUS at 17:43

## 2025-03-18 RX ADMIN — PREGABALIN 50 MG: 50 CAPSULE ORAL at 21:14

## 2025-03-18 RX ADMIN — INSULIN LISPRO 5 UNITS: 100 INJECTION, SOLUTION INTRAVENOUS; SUBCUTANEOUS at 11:57

## 2025-03-18 RX ADMIN — BUDESONIDE AND FORMOTEROL FUMARATE DIHYDRATE 2 PUFF: 160; 4.5 AEROSOL RESPIRATORY (INHALATION) at 17:43

## 2025-03-18 RX ADMIN — FLUTICASONE PROPIONATE 1 SPRAY: 50 SPRAY, METERED NASAL at 17:43

## 2025-03-18 RX ADMIN — LORATADINE 10 MG: 10 TABLET ORAL at 08:43

## 2025-03-18 RX ADMIN — BUMETANIDE 6 MG: 2 TABLET ORAL at 17:50

## 2025-03-18 RX ADMIN — POTASSIUM CHLORIDE 40 MEQ: 1500 TABLET, EXTENDED RELEASE ORAL at 08:48

## 2025-03-18 RX ADMIN — BUMETANIDE 6 MG: 2 TABLET ORAL at 08:42

## 2025-03-18 RX ADMIN — POTASSIUM CHLORIDE 40 MEQ: 1500 TABLET, EXTENDED RELEASE ORAL at 07:13

## 2025-03-18 RX ADMIN — MAGNESIUM OXIDE TAB 400 MG (241.3 MG ELEMENTAL MG) 400 MG: 400 (241.3 MG) TAB at 17:42

## 2025-03-18 RX ADMIN — EMPAGLIFLOZIN 10 MG: 10 TABLET, FILM COATED ORAL at 08:44

## 2025-03-18 RX ADMIN — INSULIN LISPRO 5 UNITS: 100 INJECTION, SOLUTION INTRAVENOUS; SUBCUTANEOUS at 17:44

## 2025-03-18 RX ADMIN — FLUTICASONE PROPIONATE 1 SPRAY: 50 SPRAY, METERED NASAL at 08:41

## 2025-03-18 RX ADMIN — MAGNESIUM OXIDE TAB 400 MG (241.3 MG ELEMENTAL MG) 400 MG: 400 (241.3 MG) TAB at 08:42

## 2025-03-18 RX ADMIN — INSULIN GLARGINE 10 UNITS: 100 INJECTION, SOLUTION SUBCUTANEOUS at 08:44

## 2025-03-18 RX ADMIN — CYANOCOBALAMIN TAB 500 MCG 1000 MCG: 500 TAB at 08:42

## 2025-03-18 RX ADMIN — MONTELUKAST 10 MG: 10 TABLET, FILM COATED ORAL at 21:14

## 2025-03-18 RX ADMIN — BUDESONIDE AND FORMOTEROL FUMARATE DIHYDRATE 2 PUFF: 160; 4.5 AEROSOL RESPIRATORY (INHALATION) at 08:42

## 2025-03-18 NOTE — PROGRESS NOTES
Progress Note - Hospitalist   Name: Mesfin Cano 59 y.o. male I MRN: [id number]  Unit/Bed#: [facility] 510-01 I Date of Admission: 3/11/2025   Date of Service: 3/18/2025 I Hospital Day: 7    Assessment & Plan  CHF, acute on chronic (HCC)  Wt Readings from Last 3 Encounters:   03/18/25 (!) 141 kg (310 lb 1.6 oz)   03/10/25 (!) 151 kg (333 lb 3.2 oz)   01/31/25 (!) 143 kg (315 lb)     Presented to the ED with lower extremity swelling and shortness of breath, gained about 18 pounds from his dry weight  Home regimen is Bumex 6 mg 3 times daily  s/p metolazone 3/13 and 3/14  Continue metoprolol, spironolactone, Jardiance  Bumex drip/17, patient started on Bumex 6 mg p.o. 3 times daily  Weight uptrending today  Potassium 3.4 today, will give extra 40 mill equivalents in addition to 3 times daily dosing  Discussed with heart failure team  Daily weights, intake and output, fluid restriction, low-salt diet  Paroxysmal atrial fibrillation (HCC)  Continue metoprolol succinate 50 mg daily  Continue Eliquis 5 mg twice daily  Heart rate control    Acute renal failure superimposed on stage 3b chronic kidney disease (HCC)  Lab Results   Component Value Date    EGFR 26 03/18/2025    EGFR 27 03/17/2025    EGFR 29 03/16/2025    CREATININE 2.59 (H) 03/18/2025    CREATININE 2.52 (H) 03/17/2025    CREATININE 2.38 (H) 03/16/2025     Baseline creatinine seems to be from 1.5-1.8  Suspect secondary to volume overload  Diuresis as above  Repeat BMP in the morning  Type 2 diabetes mellitus without complication, without long-term current use of insulin ([facility])  Lab Results   Component Value Date    HGBA1C 6.3 03/10/2025       Recent Labs     03/17/25  1019 03/17/25  1535 03/17/25  2056 03/18/25  0617   POCGLU 234* 183* 235* 274*       Blood Sugar Average: Last 72 hrs:  (P) 225.3827208580390628  PTA sitagliptin 50 mg daily  Continue Lantus 10 units in the morning, start lispro 5 units 3 times daily with insulin sliding scale  Continue Jardiance 10 mg  daily  Further adjust insulin if needed  Monitor blood sugar  Diabetic polyneuropathy associated with type 2 diabetes mellitus (HCC)  Continue home Lyrica    VTE Pharmacologic Prophylaxis: VTE Score: 3 Moderate Risk (Score 3-4) - Pharmacological DVT Prophylaxis Ordered: apixaban (Eliquis).    Mobility:   Basic Mobility Inpatient Raw Score: 24  JH-HLM Goal: 8: Walk 250 feet or more  JH-HLM Achieved: 8: Walk 250 feet ot more  JH-HLM Goal achieved. Continue to encourage appropriate mobility.    Patient Centered Rounds: I performed bedside rounds with nursing staff today.   Discussions with Specialists or Other Care Team Provider: , heart failure team    Education and Discussions with Family / Patient:  Patient will update family members.     Current Length of Stay: 7 day(s)  Current Patient Status: Inpatient   Certification Statement: The patient will continue to require additional inpatient hospital stay due to diuresis as above  Discharge Plan: Anticipate discharge in 24-48 hrs to home.    Code Status: Level 1 - Full Code    Subjective   No events overnight.  Patient reports feeling frustrated that his weight went up, he also reports some shortness of breath.  He notes improvement of his lower extremity swelling.  Has been tolerating oral intake with no nausea or vomiting.    Objective :  Temp:  [97.7 °F (36.5 °C)-100.2 °F (37.9 °C)] 99 °F (37.2 °C)  HR:  [76-98] 88  BP: (115-136)/(65-81) 126/72  Resp:  [17-20] 18  SpO2:  [92 %-99 %] 96 %    Body mass index is 40.91 kg/m².     Input and Output Summary (last 24 hours):     Intake/Output Summary (Last 24 hours) at 3/18/2025 0826  Last data filed at 3/18/2025 0735  Gross per 24 hour   Intake 728.44 ml   Output 2000 ml   Net -1271.56 ml       Physical Exam  Vitals and nursing note reviewed.   Constitutional:       General: He is not in acute distress.     Appearance: He is well-developed.   HENT:      Head: Normocephalic and atraumatic.   Eyes:       Conjunctiva/sclera: Conjunctivae normal.   Cardiovascular:      Rate and Rhythm: Normal rate and regular rhythm.   Pulmonary:      Effort: No respiratory distress.      Breath sounds: No wheezing, rhonchi or rales.   Abdominal:      Palpations: Abdomen is soft.      Tenderness: There is no abdominal tenderness.   Musculoskeletal:      Cervical back: Neck supple.      Right lower leg: No edema.      Left lower leg: No edema.   Skin:     General: Skin is warm and dry.      Capillary Refill: Capillary refill takes less than 2 seconds.   Neurological:      Mental Status: He is alert.   Psychiatric:         Mood and Affect: Mood normal.         Behavior: Behavior normal.           Lines/Drains:        Telemetry:  Telemetry Orders (From admission, onward)               24 Hour Telemetry Monitoring  Continuous x 24 Hours (Telem)        Expiring   Question:  Reason for 24 Hour Telemetry  Answer:  Decompensated CHF- and any one of the following: continuous diuretic infusion or total diuretic dose >200 mg daily, associated electrolyte derangement (I.e. K < 3.0), inotropic drip (continuous infusion), hx of ventricular arrhythmia, or new EF < 35%                     Telemetry Reviewed: Normal Sinus Rhythm  Indication for Continued Telemetry Use: No indication for continued use. Will discontinue.                Lab Results: I have reviewed the following results:   Results from last 7 days   Lab Units 03/18/25  0545 03/12/25  0538 03/11/25  2134   WBC Thousand/uL 8.49   < > 5.26   HEMOGLOBIN g/dL 11.7*   < > 11.0*   HEMATOCRIT % 34.9*   < > 33.5*   PLATELETS Thousands/uL 249   < > 208   SEGS PCT %  --   --  60   LYMPHO PCT %  --   --  22   MONO PCT %  --   --  10   EOS PCT %  --   --  6    < > = values in this interval not displayed.     Results from last 7 days   Lab Units 03/18/25  0545 03/12/25  0538 03/11/25  2134   SODIUM mmol/L 131*   < > 136   POTASSIUM mmol/L 3.4*   < > 4.1   CHLORIDE mmol/L 84*   < > 102   CO2 mmol/L 33*    < > 22   BUN mg/dL 55*   < > 18   CREATININE mg/dL 2.59*   < > 1.14   ANION GAP mmol/L 14*   < > 12   CALCIUM mg/dL 7.5*   < > 7.2*   ALBUMIN g/dL  --   --  3.2*   TOTAL BILIRUBIN mg/dL  --   --  0.30   ALK PHOS U/L  --   --  83   ALT U/L  --   --  6*   AST U/L  --   --  10*   GLUCOSE RANDOM mg/dL 237*   < > 170*    < > = values in this interval not displayed.         Results from last 7 days   Lab Units 03/18/25  0617 03/17/25  2056 03/17/25  1535 03/17/25  1019 03/17/25  0640 03/16/25  1619 03/16/25  1115 03/16/25  0644 03/15/25  2148 03/15/25  1624 03/15/25  1103 03/15/25  0630   POC GLUCOSE mg/dl 274* 235* 183* 234* 195* 241* 285* 217* 225* 224* 211* 183*               Recent Cultures (last 7 days):         Imaging Results Review: No pertinent imaging studies reviewed.  Other Study Results Review: No additional pertinent studies reviewed.    Last 24 Hours Medication List:     Current Facility-Administered Medications:     acetaminophen (TYLENOL) tablet 650 mg, Q6H PRN    apixaban (ELIQUIS) tablet 5 mg, BID    atorvastatin (LIPITOR) tablet 10 mg, Daily    budesonide-formoterol (SYMBICORT) 160-4.5 mcg/act inhaler 2 puff, BID    bumetanide (BUMEX) tablet 6 mg, TID    cyanocobalamin (VITAMIN B-12) tablet 1,000 mcg, Daily    Empagliflozin (JARDIANCE) tablet 10 mg, Daily    famotidine (PEPCID) tablet 20 mg, BID PRN    fluticasone (FLONASE) 50 mcg/act nasal spray 1 spray, BID    insulin glargine (LANTUS) subcutaneous injection 10 Units 0.1 mL, QAM    insulin lispro (HumALOG/ADMELOG) 100 units/mL subcutaneous injection 2-12 Units, TID AC **AND** Fingerstick Glucose (POCT), TID AC    loratadine (CLARITIN) tablet 10 mg, Daily    magnesium Oxide (MAG-OX) tablet 400 mg, BID    methocarbamol (ROBAXIN) tablet 500 mg, Q6H PRN    metoprolol succinate (TOPROL-XL) 24 hr tablet 50 mg, Daily    montelukast (SINGULAIR) tablet 10 mg, HS    nystatin (MYCOSTATIN) powder, BID    potassium chloride (Klor-Con M20) CR tablet 40 mEq, TID  With Meals    pregabalin (LYRICA) capsule 50 mg, HS    spironolactone (ALDACTONE) tablet 25 mg, Daily    Administrative Statements   Today, Patient Was Seen By: Meghan Paul MD      **Please Note: This note may have been constructed using a voice recognition system.**

## 2025-03-18 NOTE — ASSESSMENT & PLAN NOTE
Wt Readings from Last 3 Encounters:   03/18/25 (!) 141 kg (310 lb 1.6 oz)   03/10/25 (!) 151 kg (333 lb 3.2 oz)   01/31/25 (!) 143 kg (315 lb)     Presented to the ED with lower extremity swelling and shortness of breath, gained about 18 pounds from his dry weight  Home regimen is Bumex 6 mg 3 times daily  s/p metolazone 3/13 and 3/14  Continue metoprolol, spironolactone, Jardiance  Bumex drip/17, patient started on Bumex 6 mg p.o. 3 times daily  Weight uptrending today  Potassium 3.4 today, will give extra 40 mill equivalents in addition to 3 times daily dosing  Discussed with heart failure team  Daily weights, intake and output, fluid restriction, low-salt diet

## 2025-03-18 NOTE — PROGRESS NOTES
Progress Note - Heart Failure   Name: Mesfin Cano 59 y.o. male I MRN: [id number]  Unit/Bed#: [facility] 510-01 I Date of Admission: 3/11/2025   Date of Service: 3/18/2025 I Hospital Day: 7    Assessment & Plan  CHF, acute on chronic (HCC)  Wt Readings from Last 3 Encounters:   03/18/25 (!) 141 kg (310 lb 1.6 oz)   03/10/25 (!) 151 kg (333 lb 3.2 oz)   01/31/25 (!) 143 kg (315 lb)     Patient with history of HFpEF, Stage C, NYHA III  Last echo 6/2024 which showed concentric LV hypertrophy with EF 55%, normal systolic functoin. LA moderately dilated. Akinetic basal inferolateral and mid inferolateral region.   Had CardioMEMS device implanted 3/2022, most recent readings: dPA 3/11 16 > 16> 14> 12> 15 > 15 -- of which are above goal (10-12)    Approximate 17lb weight gain over past two weeks, with elevated BNP on admission at 319. CXR without evidence of vascular congestion or pulmonary edema. Was due for f/u HF outpatient 3/6 but did not show to the appointment. Frequent admissions every 1-2 months of recent. Had been taking metolazone 5mg daily since Monday 3/10, normally takes three times per week. Received x1 Bumex 2mg IV in ED before being started on Bumex gtt via admitting team. Transitioned from bumex gtt to oral diuretics on 3/17.    GDMT:  --Beta Blocker: Metoprolol succinate 50 mg daily.    --ARNi / ACEi / ARB: None currently  --Aldosterone Antagonist: Spironolactone 25mg daily  --SGLT2 Inhibitor: Jardiance 10mg daily  --Home Diuretic: Bumex 6mg TID with metolazone 5mg TIW    Plan:  Continue metoprolol 50mg daily  Continue aldactone 25mg daily, can consider increasing dose for potassium sparing support if kidney function allows  Continue jardiance 10mg daily  S/p metolazone 2.5mg x1 3/13, 3/14  Continue with PO 6mg Bumex TID  Received x1 dose oral 6mg bumex after gtt discontinued yesterday. Will monitor urine output and weight on home dosing today to assess whether changes need to be made prior to  discharge  BMP/mag -- follow electrolytes for repletion  Currently Kdur 40 mEq TID, MagOx 400 BID - may need to increase prior to discharge   Paroxysmal atrial fibrillation (HCC)  [id number] score of 3 (HTN, HF, DM)  BB, eliquis at home    Plan:  Continue beta blocker as above  Continue Eliquis   Acute renal failure superimposed on stage 3b chronic kidney disease (HCC)  Lab Results   Component Value Date    EGFR 26 03/18/2025    EGFR 27 03/17/2025    EGFR 29 03/16/2025    CREATININE 2.59 (H) 03/18/2025    CREATININE 2.52 (H) 03/17/2025    CREATININE 2.38 (H) 03/16/2025     Baseline has historically been 1.5-2  Has been uptrending in the setting of IV diuresis. Continue to monitor upon transition to oral regimen.  Type 2 diabetes mellitus without complication, without long-term current use of insulin ([facility])  Lab Results   Component Value Date    HGBA1C 6.3 03/10/2025       Recent Labs     03/17/25  1535 03/17/25 2056 03/18/25  0617 03/18/25  1102   POCGLU 183* 235* 274* 246*       Blood Sugar Average: Last 72 hrs:  (P) [id number]    Controlled but is risk factor. Management per primary team.   Diabetic polyneuropathy associated with type 2 diabetes mellitus ([facility])  Lab Results   Component Value Date    HGBA1C 6.3 03/10/2025       Recent Labs     03/17/25  1535 03/17/25 2056 03/18/25  0617 03/18/25  1102   POCGLU 183* 235* 274* 246*       Blood Sugar Average: Last 72 hrs:  (P) 227.7358251935657037      Subjective   Chief Complaint: SOB, volume overload    Patient frustrated by slight weight gain based on morning weight. Does not feel significantly more short of breath or that the fluid has accumulated in a specific area he can appreciate. Denies chest pain, palpitations, dizziness.    Objective :  Temp:  [97.6 °F (36.4 °C)-100.2 °F (37.9 °C)] 97.6 °F (36.4 °C)  HR:  [78-98] 78  BP: (115-126)/(62-81) 115/62  Resp:  [17-20] 18  SpO2:  [92 %-99 %] 95 %  O2 Device: None (Room air)  Orthostatic Blood Pressures       Flowsheet Row Most Recent Value   Blood Pressure 115/62 filed at 03/18/2025 1103   Patient Position - Orthostatic VS Lying filed at 03/18/2025 0146          First Weight: Weight - Scale: (!) 150 kg (331 lb 5.6 oz) (03/12/25 1045)  Vitals:    03/17/25 0500 03/18/25 0500   Weight: (!) 139 kg (306 lb 14.1 oz) (!) 141 kg (310 lb 1.6 oz)     Physical Exam  Vitals and nursing note reviewed.   Constitutional:       General: He is not in acute distress.     Appearance: He is well-developed. He is obese.   HENT:      Head: Normocephalic and atraumatic.   Eyes:      Conjunctiva/sclera: Conjunctivae normal.   Neck:      Comments: No appreciable JVP that is significantly elevated.  Cardiovascular:      Rate and Rhythm: Normal rate and regular rhythm.      Heart sounds: No murmur heard.  Pulmonary:      Effort: Pulmonary effort is normal. No respiratory distress.      Breath sounds: Normal breath sounds.   Abdominal:      Palpations: Abdomen is soft.      Tenderness: There is no abdominal tenderness.   Musculoskeletal:      Cervical back: Neck supple.      Right lower leg: Edema (non-pitting) present.      Left lower leg: Edema (non-pitting) present.   Skin:     General: Skin is warm and dry.      Capillary Refill: Capillary refill takes less than 2 seconds.   Neurological:      Mental Status: He is alert.   Psychiatric:         Mood and Affect: Mood normal.         Lab Results: I have reviewed the following results:  Results from last 7 days   Lab Units 03/18/25  0545 03/12/25  0538 03/11/25  2134   WBC Thousand/uL 8.49 5.91 5.26   HEMOGLOBIN g/dL 11.7* 10.9* 11.0*   HEMATOCRIT % 34.9* 33.3* 33.5*   PLATELETS Thousands/uL 249 204 208     Results from last 7 days   Lab Units 03/18/25  0545 03/17/25  0605 03/16/25  1633   POTASSIUM mmol/L 3.4* 3.3* 3.5   CHLORIDE mmol/L 84* 83* 82*   CO2 mmol/L 33* 37* 36*   BUN mg/dL 55* 48* 46*   CREATININE mg/dL 2.59* 2.52* 2.38*   CALCIUM mg/dL 7.5* 7.9* 8.1*         Lab Results    Component Value Date    HGBA1C 6.3 03/10/2025     Lab Results   Component Value Date    TROPONINI <0.02 09/22/2021       VTE Pharmacologic Prophylaxis: Eliquis  VTE Mechanical Prophylaxis: sequential compression device    Ilda Williamson DO  [facility]  Internal Medicine Residency, PGY-2

## 2025-03-18 NOTE — ASSESSMENT & PLAN NOTE
Wt Readings from Last 3 Encounters:   03/18/25 (!) 141 kg (310 lb 1.6 oz)   03/10/25 (!) 151 kg (333 lb 3.2 oz)   01/31/25 (!) 143 kg (315 lb)     Patient with history of HFpEF, Stage C, NYHA III  Last echo 6/2024 which showed concentric LV hypertrophy with EF 55%, normal systolic functoin. LA moderately dilated. Akinetic basal inferolateral and mid inferolateral region.   Had CardioMEMS device implanted 3/2022, most recent readings: dPA 3/11 16 > 16> 14> 12> 15 > 15 -- of which are above goal (10-12)    Approximate 17lb weight gain over past two weeks, with elevated BNP on admission at 319. CXR without evidence of vascular congestion or pulmonary edema. Was due for f/u HF outpatient 3/6 but did not show to the appointment. Frequent admissions every 1-2 months of recent. Had been taking metolazone 5mg daily since Monday 3/10, normally takes three times per week. Received x1 Bumex 2mg IV in ED before being started on Bumex gtt via admitting team. Transitioned from bumex gtt to oral diuretics on 3/17.    GDMT:  --Beta Blocker: Metoprolol succinate 50 mg daily.    --ARNi / ACEi / ARB: None currently  --Aldosterone Antagonist: Spironolactone 25mg daily  --SGLT2 Inhibitor: Jardiance 10mg daily  --Home Diuretic: Bumex 6mg TID with metolazone 5mg TIW    Plan:  Continue metoprolol 50mg daily  Continue aldactone 25mg daily, can consider increasing dose for potassium sparing support if kidney function allows  Continue jardiance 10mg daily  S/p metolazone 2.5mg x1 3/13, 3/14  Continue with PO 6mg Bumex TID  Received x1 dose oral 6mg bumex after gtt discontinued yesterday. Will monitor urine output and weight on home dosing today to assess whether changes need to be made prior to discharge  BMP/mag -- follow electrolytes for repletion  Currently Kdur 40 mEq TID, MagOx 400 BID - may need to increase prior to discharge

## 2025-03-18 NOTE — ASSESSMENT & PLAN NOTE
[id number] score of 3 (HTN, HF, DM)  BB, eliquis at home    Plan:  Continue beta blocker as above  Continue Eliquis

## 2025-03-18 NOTE — ASSESSMENT & PLAN NOTE
Lab Results   Component Value Date    HGBA1C 6.3 03/10/2025       Recent Labs     03/17/25  1535 03/17/25 2056 03/18/25 0617 03/18/25  1102   POCGLU 183* 235* 274* 246*       Blood Sugar Average: Last 72 hrs:  (P) [id number]

## 2025-03-18 NOTE — UTILIZATION REVIEW
Continued Stay Review    Date: 03/18                          Current Patient Class: Inpatient  Current Level of Care: MS    HPI:59 y.o. male initially admitted on 03/11   Current Diagnosis:acute on chronic HFpEF    Assessment/Plan: Pt noted w/ slight wt gain this am. He does not feel significantly more SOB. Creatinine slightly up to 2.59 today. K 3.4, . Received 1 dose of po Bumex 6 mg after Bumex gtt was dc'd yesterday. Cont po Bumex. Kdur 40 mEq TID, MagOx 400 BID. Mon BMP. Mon electrolytes for repletion. Cont BB. Cont spironolactone. Continue jardiance. Mon I/O, daily wts.         Medications:   Scheduled Medications:  apixaban, 5 mg, Oral, BID  atorvastatin, 10 mg, Oral, Daily  budesonide-formoterol, 2 puff, Inhalation, BID  bumetanide, 6 mg, Oral, TID  cyanocobalamin, 1,000 mcg, Oral, Daily  Empagliflozin, 10 mg, Oral, Daily  fluticasone, 1 spray, Nasal, BID  insulin glargine, 10 Units, Subcutaneous, QAM  insulin lispro, 2-12 Units, Subcutaneous, TID AC  insulin lispro, 5 Units, Subcutaneous, TID With Meals  loratadine, 10 mg, Oral, Daily  magnesium Oxide, 400 mg, Oral, BID  metoprolol succinate, 50 mg, Oral, Daily  montelukast, 10 mg, Oral, HS  nystatin, , Topical, BID  potassium chloride, 40 mEq, Oral, TID With Meals  pregabalin, 50 mg, Oral, HS  spironolactone, 25 mg, Oral, Daily      Continuous IV Infusions:     PRN Meds:  acetaminophen, 650 mg, Oral, Q6H PRN  famotidine, 20 mg, Oral, BID PRN  methocarbamol, 500 mg, Oral, Q6H PRN      Discharge Plan: TBD    Vital Signs (last 3 days)       Date/Time Temp Pulse Resp BP MAP (mmHg) SpO2 O2 Device Patient Position - Orthostatic VS Lilly Coma Scale Score Pain    03/18/25 15:27:52 97.8 °F (36.6 °C) 81 16 110/64 79 96 % None (Room air) Sitting -- No Pain    03/18/25 11:03:33 97.6 °F (36.4 °C) 78 18 115/62 80 95 % -- -- -- --    03/18/25 07:34:23 99 °F (37.2 °C) 88 18 126/72 90 96 % -- -- -- --    03/18/25 0715 -- -- -- -- -- -- None (Room air) -- 15 No  Pain    03/18/25 01:46:38 100.2 °F (37.9 °C) 98 17 115/65 82 92 % -- Lying -- --    03/17/25 21:53:55 98.1 °F (36.7 °C) 80 20 119/65 83 98 % -- Lying -- --    03/17/25 2000 -- -- -- -- -- -- -- -- 15 No Pain    03/17/25 19:09:23 97.8 °F (36.6 °C) 83 17 125/78 94 98 % -- Lying -- --    03/17/25 15:37:18 97.7 °F (36.5 °C) 82 18 118/81 93 99 % -- -- -- --    03/17/25 10:31:50 97.8 °F (36.6 °C) 76 20 136/77 97 97 % -- -- -- --    03/17/25 0900 -- -- -- -- -- -- -- -- 15 No Pain    03/17/25 07:16:27 98.6 °F (37 °C) 93 18 136/78 97 95 % -- Sitting -- --    03/17/25 03:40:24 98.1 °F (36.7 °C) 79 17 126/69 88 96 % -- -- -- --    03/17/25 0000 -- -- -- -- -- -- -- -- 15 No Pain    03/16/25 22:49:28 98.3 °F (36.8 °C) 81 19 121/70 87 97 % -- Sitting -- --    03/16/25 21:15:16 98 °F (36.7 °C) 81 17 149/90 110 97 % -- Sitting -- --    03/16/25 20:08:29 98 °F (36.7 °C) 80 19 134/80 98 95 % -- Lying -- No Pain    03/16/25 16:22:03 97.8 °F (36.6 °C) 82 -- 111/76 88 96 % -- -- -- --    03/16/25 0800 -- -- -- -- -- -- -- -- -- No Pain    03/16/25 07:50:30 97.9 °F (36.6 °C) 90 18 132/76 95 95 % -- -- -- --    03/15/25 21:11:22 97.8 °F (36.6 °C) 78 -- 125/71 89 96 % -- -- -- --    03/15/25 20:09:17 97.9 °F (36.6 °C) 77 -- 110/63 79 96 % -- -- -- --    03/15/25 2000 -- -- -- -- -- -- -- -- -- No Pain    03/15/25 16:25:08 97.7 °F (36.5 °C) 80 -- 121/73 89 98 % -- -- -- --    03/15/25 11:49:12 -- 85 -- 125/72 90 96 % -- -- -- --    03/15/25 0800 -- -- -- -- -- -- -- -- -- No Pain    03/15/25 07:44:36 98 °F (36.7 °C) -- 19 146/86 106 -- None (Room air) Sitting -- --    03/15/25 0600 -- 74 -- 138/77 97 93 % -- -- -- --    03/15/25 0430 -- 81 -- 139/79 99 90 % -- -- -- --    03/15/25 0300 -- 75 -- 119/77 91 94 % -- -- -- --    03/15/25 02:00:43 97.6 °F (36.4 °C) 79 -- 125/80 95 97 % -- -- -- --    03/15/25 0130 -- 85 -- 141/80 100 95 % -- -- -- --    03/15/25 0000 -- 78 -- 125/79 94 96 % -- -- -- --          Weight (last 2 days)        Date/Time Weight    03/18/25 0500 141 (310.1)    03/17/25 0500 139 (306.88)    03/16/25 0600 140 (309.75)            Pertinent Labs/Diagnostic Results:   Radiology:  XR chest 2 views   Final Interpretation by Wm Vargas MD (03/12 0908)      No acute cardiopulmonary disease.            Workstation performed: [id number]           Cardiology:  ECG 12 lead   Final Result by Bobby Betancur MD (03/12 0743)    Age and gender specific ECG analysis    Normal sinus rhythm   Low voltage QRS   Septal infarct , age undetermined   Abnormal ECG   When compared with ECG of 04-Jan-2025 15:51,   Septal infarct is now Present   Confirmed by Bobby Betancur ([id number]) on 3/12/2025 7:43:17 AM        GI:  No orders to display           Results from last 7 days   Lab Units 03/18/25  0545 03/12/25  0538 03/11/25  2134   WBC Thousand/uL 8.49 5.91 5.26   HEMOGLOBIN g/dL 11.7* 10.9* 11.0*   HEMATOCRIT % 34.9* 33.3* 33.5*   PLATELETS Thousands/uL 249 204 208   TOTAL NEUT ABS Thousands/µL  --   --  3.12         Results from last 7 days   Lab Units 03/18/25  0545 03/17/25  0605 03/16/25  1633 03/16/25  0616 03/15/25  1828 03/15/25  0922 03/14/25  1634   SODIUM mmol/L 131* 133* 132* 134* 132* 132* 131*   POTASSIUM mmol/L 3.4* 3.3* 3.5 3.2* 3.6 3.3* 4.0   CHLORIDE mmol/L 84* 83* 82* 84* 82* 83* 83*   CO2 mmol/L 33* 37* 36* 38* 34* 37* 33*   ANION GAP mmol/L 14* 13 14* 12 16* 12 15*   BUN mg/dL 55* 48* 46* 42* 38* 36* 31*   CREATININE mg/dL 2.59* 2.52* 2.38* 2.33* 2.42* 1.99* 2.01*   EGFR ml/min/1.73sq m 26 27 29 29 28 36 35   CALCIUM mg/dL 7.5* 7.9* 8.1* 7.6* 8.3* 7.8* 8.1*   MAGNESIUM mg/dL 2.6 2.7  --  2.5  --  2.5 2.5     Results from last 7 days   Lab Units 03/11/25  2134   AST U/L 10*   ALT U/L 6*   ALK PHOS U/L 83   TOTAL PROTEIN g/dL 5.8*   ALBUMIN g/dL 3.2*   TOTAL BILIRUBIN mg/dL 0.30     Results from last 7 days   Lab Units 03/18/25  1537 03/18/25  1102 03/18/25  0617 03/17/25  2056 03/17/25  1535 03/17/25  1019 03/17/25  0640  "03/16/25  1619 03/16/25  1115 03/16/25  0644 03/15/25  2148 03/15/25  1624   POC GLUCOSE mg/dl 197* 246* 274* 235* 183* 234* 195* 241* 285* 217* 225* 224*     Results from last 7 days   Lab Units 03/18/25  0545 03/17/25  0605 03/16/25  1633 03/16/25  0616 03/15/25  1828 03/15/25  0922 03/14/25  1634 03/14/25  0531 03/13/25  1811 03/13/25  0601 03/12/25  1806 03/12/25  0538   GLUCOSE RANDOM mg/dL 237* 212* 219* 213* 261* 224* 198* 165* 196* 148* 177* 168*             No results found for: \"BETA-HYDROXYBUTYRATE\"                   Results from last 7 days   Lab Units 03/11/25  2326 03/11/25  2134   HS TNI 0HR ng/L  --  14   HS TNI 2HR ng/L 12  --    HSTNI D2 ng/L -2  --                                  Results from last 7 days   Lab Units 03/11/25  2134   BNP pg/mL 319*                                                                                       Network Utilization Review Department  ATTENTION: Please call with any questions or concerns to [phone] and carefully listen to the prompts so that you are directed to the right person. All voicemails are confidential.   For Discharge needs, contact Care Management DC Support Team at [phone] opt. 2  Send all requests for admission clinical reviews, approved or denied determinations and any other requests to dedicated fax number below belonging to the [facility] where the patient is receiving treatment. List of dedicated fax numbers for the Facilities:  FACILITY NAME UR FAX NUMBER   ADMISSION DENIALS (Administrative/Medical Necessity) [phone]   DISCHARGE SUPPORT TEAM (NETWORK) [phone]   PARENT CHILD HEALTH (Maternity/NICU/Pediatrics) [phone]   [facility] [phone]   [facility] [phone]   [facility] [phone]   [facility] [phone]   [facility] [phone]   [facility] [phone] "   [facility] [phone]   [facility] [phone]   [facility] [phone]   [facility] [phone]   [facility] [phone]   [facility] [phone]

## 2025-03-18 NOTE — ASSESSMENT & PLAN NOTE
Lab Results   Component Value Date    EGFR 26 03/18/2025    EGFR 27 03/17/2025    EGFR 29 03/16/2025    CREATININE 2.59 (H) 03/18/2025    CREATININE 2.52 (H) 03/17/2025    CREATININE 2.38 (H) 03/16/2025     Baseline has historically been 1.5-2  Has been uptrending in the setting of IV diuresis. Continue to monitor upon transition to oral regimen.

## 2025-03-18 NOTE — ASSESSMENT & PLAN NOTE
Continue metoprolol succinate 50 mg daily  Continue Eliquis 5 mg twice daily  Heart rate control

## 2025-03-18 NOTE — ASSESSMENT & PLAN NOTE
Lab Results   Component Value Date    HGBA1C 6.3 03/10/2025       Recent Labs     03/17/25  1019 03/17/25  1535 03/17/25 2056 03/18/25  0617   POCGLU 234* 183* 235* 274*       Blood Sugar Average: Last 72 hrs:  (P) [id number]  PTA sitagliptin 50 mg daily  Continue Lantus 10 units in the morning, start lispro 5 units 3 times daily with insulin sliding scale  Continue Jardiance 10 mg daily  Further adjust insulin if needed  Monitor blood sugar

## 2025-03-18 NOTE — ASSESSMENT & PLAN NOTE
Lab Results   Component Value Date    EGFR 26 03/18/2025    EGFR 27 03/17/2025    EGFR 29 03/16/2025    CREATININE 2.59 (H) 03/18/2025    CREATININE 2.52 (H) 03/17/2025    CREATININE 2.38 (H) 03/16/2025     Baseline creatinine seems to be from 1.5-1.8  Suspect secondary to volume overload  Diuresis as above  Repeat BMP in the morning

## 2025-03-18 NOTE — ASSESSMENT & PLAN NOTE
Lab Results   Component Value Date    HGBA1C 6.3 03/10/2025       Recent Labs     03/17/25  1535 03/17/25 2056 03/18/25  0617 03/18/25  1102   POCGLU 183* 235* 274* 246*       Blood Sugar Average: Last 72 hrs:  (P) 227.9472290198909588    Controlled but is risk factor. Management per primary team.

## 2025-03-19 LAB
ANION GAP SERPL CALCULATED.3IONS-SCNC: 14 MMOL/L (ref 4–13)
BUN SERPL-MCNC: 59 MG/DL (ref 5–25)
CALCIUM SERPL-MCNC: 7.4 MG/DL (ref 8.4–10.2)
CHLORIDE SERPL-SCNC: 87 MMOL/L (ref 96–108)
CO2 SERPL-SCNC: 30 MMOL/L (ref 21–32)
CREAT SERPL-MCNC: 2.45 MG/DL (ref 0.6–1.3)
GFR SERPL CREATININE-BSD FRML MDRD: 28 ML/MIN/1.73SQ M
GLUCOSE SERPL-MCNC: 199 MG/DL (ref 65–140)
GLUCOSE SERPL-MCNC: 220 MG/DL (ref 65–140)
GLUCOSE SERPL-MCNC: 232 MG/DL (ref 65–140)
GLUCOSE SERPL-MCNC: 237 MG/DL (ref 65–140)
GLUCOSE SERPL-MCNC: 304 MG/DL (ref 65–140)
POTASSIUM SERPL-SCNC: 3.5 MMOL/L (ref 3.5–5.3)
SODIUM SERPL-SCNC: 131 MMOL/L (ref 135–147)

## 2025-03-19 PROCEDURE — 99232 SBSQ HOSP IP/OBS MODERATE 35: CPT | Performed by: STUDENT IN AN ORGANIZED HEALTH CARE EDUCATION/TRAINING PROGRAM

## 2025-03-19 PROCEDURE — 82948 REAGENT STRIP/BLOOD GLUCOSE: CPT

## 2025-03-19 PROCEDURE — 80048 BASIC METABOLIC PNL TOTAL CA: CPT | Performed by: STUDENT IN AN ORGANIZED HEALTH CARE EDUCATION/TRAINING PROGRAM

## 2025-03-19 PROCEDURE — 99232 SBSQ HOSP IP/OBS MODERATE 35: CPT | Performed by: INTERNAL MEDICINE

## 2025-03-19 RX ORDER — INSULIN LISPRO 100 [IU]/ML
2-12 INJECTION, SOLUTION INTRAVENOUS; SUBCUTANEOUS
Status: DISCONTINUED | OUTPATIENT
Start: 2025-03-19 | End: 2025-03-20 | Stop reason: HOSPADM

## 2025-03-19 RX ORDER — INSULIN GLARGINE 100 [IU]/ML
12 INJECTION, SOLUTION SUBCUTANEOUS EVERY MORNING
Status: DISCONTINUED | OUTPATIENT
Start: 2025-03-20 | End: 2025-03-20 | Stop reason: HOSPADM

## 2025-03-19 RX ADMIN — MAGNESIUM OXIDE TAB 400 MG (241.3 MG ELEMENTAL MG) 400 MG: 400 (241.3 MG) TAB at 08:20

## 2025-03-19 RX ADMIN — FLUTICASONE PROPIONATE 1 SPRAY: 50 SPRAY, METERED NASAL at 08:23

## 2025-03-19 RX ADMIN — POTASSIUM CHLORIDE 40 MEQ: 1500 TABLET, EXTENDED RELEASE ORAL at 07:32

## 2025-03-19 RX ADMIN — FLUTICASONE PROPIONATE 1 SPRAY: 50 SPRAY, METERED NASAL at 17:30

## 2025-03-19 RX ADMIN — INSULIN LISPRO 5 UNITS: 100 INJECTION, SOLUTION INTRAVENOUS; SUBCUTANEOUS at 11:36

## 2025-03-19 RX ADMIN — INSULIN GLARGINE 10 UNITS: 100 INJECTION, SOLUTION SUBCUTANEOUS at 08:21

## 2025-03-19 RX ADMIN — METOPROLOL SUCCINATE 50 MG: 50 TABLET, EXTENDED RELEASE ORAL at 08:20

## 2025-03-19 RX ADMIN — INSULIN LISPRO 5 UNITS: 100 INJECTION, SOLUTION INTRAVENOUS; SUBCUTANEOUS at 17:32

## 2025-03-19 RX ADMIN — INSULIN LISPRO 4 UNITS: 100 INJECTION, SOLUTION INTRAVENOUS; SUBCUTANEOUS at 21:10

## 2025-03-19 RX ADMIN — POTASSIUM CHLORIDE 40 MEQ: 1500 TABLET, EXTENDED RELEASE ORAL at 17:29

## 2025-03-19 RX ADMIN — BUMETANIDE 6 MG: 2 TABLET ORAL at 08:20

## 2025-03-19 RX ADMIN — SPIRONOLACTONE 25 MG: 25 TABLET, FILM COATED ORAL at 08:20

## 2025-03-19 RX ADMIN — LORATADINE 10 MG: 10 TABLET ORAL at 08:20

## 2025-03-19 RX ADMIN — INSULIN LISPRO 5 UNITS: 100 INJECTION, SOLUTION INTRAVENOUS; SUBCUTANEOUS at 07:32

## 2025-03-19 RX ADMIN — BUMETANIDE 6 MG: 2 TABLET ORAL at 17:41

## 2025-03-19 RX ADMIN — INSULIN LISPRO 4 UNITS: 100 INJECTION, SOLUTION INTRAVENOUS; SUBCUTANEOUS at 11:35

## 2025-03-19 RX ADMIN — INSULIN LISPRO 2 UNITS: 100 INJECTION, SOLUTION INTRAVENOUS; SUBCUTANEOUS at 17:31

## 2025-03-19 RX ADMIN — MAGNESIUM OXIDE TAB 400 MG (241.3 MG ELEMENTAL MG) 400 MG: 400 (241.3 MG) TAB at 17:29

## 2025-03-19 RX ADMIN — ATORVASTATIN CALCIUM 10 MG: 10 TABLET, FILM COATED ORAL at 08:20

## 2025-03-19 RX ADMIN — BUDESONIDE AND FORMOTEROL FUMARATE DIHYDRATE 2 PUFF: 160; 4.5 AEROSOL RESPIRATORY (INHALATION) at 17:30

## 2025-03-19 RX ADMIN — APIXABAN 5 MG: 5 TABLET, FILM COATED ORAL at 17:29

## 2025-03-19 RX ADMIN — PREGABALIN 50 MG: 50 CAPSULE ORAL at 21:09

## 2025-03-19 RX ADMIN — MONTELUKAST 10 MG: 10 TABLET, FILM COATED ORAL at 21:09

## 2025-03-19 RX ADMIN — APIXABAN 5 MG: 5 TABLET, FILM COATED ORAL at 08:20

## 2025-03-19 RX ADMIN — EMPAGLIFLOZIN 10 MG: 10 TABLET, FILM COATED ORAL at 08:23

## 2025-03-19 RX ADMIN — CYANOCOBALAMIN TAB 500 MCG 1000 MCG: 500 TAB at 08:20

## 2025-03-19 RX ADMIN — BUDESONIDE AND FORMOTEROL FUMARATE DIHYDRATE 2 PUFF: 160; 4.5 AEROSOL RESPIRATORY (INHALATION) at 08:23

## 2025-03-19 RX ADMIN — POTASSIUM CHLORIDE 40 MEQ: 1500 TABLET, EXTENDED RELEASE ORAL at 11:34

## 2025-03-19 RX ADMIN — BUMETANIDE 6 MG: 2 TABLET ORAL at 21:09

## 2025-03-19 RX ADMIN — INSULIN LISPRO 4 UNITS: 100 INJECTION, SOLUTION INTRAVENOUS; SUBCUTANEOUS at 07:32

## 2025-03-19 NOTE — PROGRESS NOTES
Progress Note - Hospitalist   Name: Mesfin Cano 59 y.o. male I MRN: [id number]  Unit/Bed#: [facility] 510-01 I Date of Admission: 3/11/2025   Date of Service: 3/19/2025 I Hospital Day: 8    Assessment & Plan  CHF, acute on chronic (HCC)  Wt Readings from Last 3 Encounters:   03/19/25 (!) 141 kg (310 lb 1.6 oz)   03/10/25 (!) 151 kg (333 lb 3.2 oz)   01/31/25 (!) 143 kg (315 lb)     Presented to the ED with lower extremity swelling and shortness of breath, gained about 18 pounds from his dry weight  Home regimen is Bumex 6 mg 3 times daily  s/p metolazone 3/13 and 3/14  Continue metoprolol, spironolactone, Jardiance  Bumex drip dced on 3/17, patient started on Bumex 6 mg p.o. 3 times daily on 3/18 and has been tolerating well with stable weight  Continue metolazone 5mg three times per week T/TH/SAT, next on 3/22  Daily weights, intake and output, fluid restriction, low-salt diet  Discussed with heart failure team.  Plan for outpatient heart failure follow-up  Paroxysmal atrial fibrillation (HCC)  Continue metoprolol succinate 50 mg daily  Continue Eliquis 5 mg twice daily  Heart rate control  Acute renal failure superimposed on stage 3b chronic kidney disease (HCC)  Lab Results   Component Value Date    EGFR 28 03/19/2025    EGFR 26 03/18/2025    EGFR 27 03/17/2025    CREATININE 2.45 (H) 03/19/2025    CREATININE 2.59 (H) 03/18/2025    CREATININE 2.52 (H) 03/17/2025     Baseline creatinine seems to be from 1.5-1.8  Diuresis as above  Creatinine improving  Discussed with patient prolonged renal recovery  Repeat BMP in the morning  Type 2 diabetes mellitus without complication, without long-term current use of insulin ([facility])  Lab Results   Component Value Date    HGBA1C 6.3 03/10/2025       Recent Labs     03/18/25  1102 03/18/25  1537 03/18/25 2026 03/19/25  0551   POCGLU 246* 197* 206* 220*       Blood Sugar Average: Last 72 hrs:  (P) 227.75  PTA sitagliptin 50 mg daily  Continue Lantus 12 units in the morning, start  lispro 5 units 3 times daily with insulin sliding scale  Continue Jardiance 10 mg daily  Further adjust insulin if needed  Monitor blood sugar  Diabetic polyneuropathy associated with type 2 diabetes mellitus (HCC)  Continue home Lyrica    VTE Pharmacologic Prophylaxis: VTE Score: 3 Moderate Risk (Score 3-4) - Pharmacological DVT Prophylaxis Ordered: apixaban (Eliquis).    Mobility:   Basic Mobility Inpatient Raw Score: 24  JH-HLM Goal: 8: Walk 250 feet or more  JH-HLM Achieved: 8: Walk 250 feet ot more  JH-HLM Goal achieved. Continue to encourage appropriate mobility.    Patient Centered Rounds: I performed bedside rounds with nursing staff today.   Discussions with Specialists or Other Care Team Provider: CM, HF    Education and Discussions with Family / Patient:  Patient will update family.     Current Length of Stay: 8 day(s)  Current Patient Status: Inpatient   Certification Statement: The patient will continue to require additional inpatient hospital stay due to dispo planning  Discharge Plan: Anticipate discharge tomorrow to home.    Code Status: Level 1 - Full Code    Subjective   No events overnight.  Patient has no complaints this morning.  He feels anxious about going home due to his kidney function not being at baseline.  We discussed prolonged renal recovery and that it is downtrending.  His weight has remained stable and his breathing is at baseline.    Objective :  Temp:  [97.6 °F (36.4 °C)-98.9 °F (37.2 °C)] 98 °F (36.7 °C)  HR:  [76-90] 76  BP: (109-120)/(62-71) 114/71  Resp:  [16-25] 16  SpO2:  [94 %-96 %] 94 %  O2 Device: None (Room air)    Body mass index is 40.91 kg/m².     Input and Output Summary (last 24 hours):     Intake/Output Summary (Last 24 hours) at 3/19/2025 0848  Last data filed at 3/19/2025 0735  Gross per 24 hour   Intake 600 ml   Output 3375 ml   Net -2775 ml       Physical Exam  Vitals and nursing note reviewed.   Constitutional:       General: He is not in acute distress.      Appearance: He is well-developed.   HENT:      Head: Normocephalic and atraumatic.   Eyes:      Conjunctiva/sclera: Conjunctivae normal.   Cardiovascular:      Rate and Rhythm: Normal rate and regular rhythm.   Pulmonary:      Effort: No respiratory distress.      Breath sounds: Normal breath sounds.   Musculoskeletal:         General: No swelling.      Cervical back: Neck supple.   Skin:     General: Skin is warm and dry.      Capillary Refill: Capillary refill takes less than 2 seconds.   Neurological:      Mental Status: He is alert.   Psychiatric:         Mood and Affect: Mood normal.         Behavior: Behavior normal.           Lines/Drains:        Telemetry:  Telemetry Orders (From admission, onward)               24 Hour Telemetry Monitoring  Continuous x 24 Hours (Telem)        Expiring   Question:  Reason for 24 Hour Telemetry  Answer:  Decompensated CHF- and any one of the following: continuous diuretic infusion or total diuretic dose >200 mg daily, associated electrolyte derangement (I.e. K < 3.0), inotropic drip (continuous infusion), hx of ventricular arrhythmia, or new EF < 35%                     Telemetry Reviewed: Normal Sinus Rhythm  Indication for Continued Telemetry Use: No indication for continued use. Will discontinue.                Lab Results: I have reviewed the following results:   Results from last 7 days   Lab Units 03/18/25  0545   WBC Thousand/uL 8.49   HEMOGLOBIN g/dL 11.7*   HEMATOCRIT % 34.9*   PLATELETS Thousands/uL 249     Results from last 7 days   Lab Units 03/19/25  0746   SODIUM mmol/L 131*   POTASSIUM mmol/L 3.5   CHLORIDE mmol/L 87*   CO2 mmol/L 30   BUN mg/dL 59*   CREATININE mg/dL 2.45*   ANION GAP mmol/L 14*   CALCIUM mg/dL 7.4*   GLUCOSE RANDOM mg/dL 304*         Results from last 7 days   Lab Units 03/19/25  0551 03/18/25  2026 03/18/25  1537 03/18/25  1102 03/18/25  0617 03/17/25  2056 03/17/25  1535 03/17/25  1019 03/17/25  0640 03/16/25  1619 03/16/25  1115  03/16/25  0644   POC GLUCOSE mg/dl 220* 206* 197* 246* 274* 235* 183* 234* 195* 241* 285* 217*               Recent Cultures (last 7 days):         Imaging Results Review: No pertinent imaging studies reviewed.  Other Study Results Review: No additional pertinent studies reviewed.    Last 24 Hours Medication List:     Current Facility-Administered Medications:     acetaminophen (TYLENOL) tablet 650 mg, Q6H PRN    apixaban (ELIQUIS) tablet 5 mg, BID    atorvastatin (LIPITOR) tablet 10 mg, Daily    budesonide-formoterol (SYMBICORT) 160-4.5 mcg/act inhaler 2 puff, BID    bumetanide (BUMEX) tablet 6 mg, TID    cyanocobalamin (VITAMIN B-12) tablet 1,000 mcg, Daily    Empagliflozin (JARDIANCE) tablet 10 mg, Daily    famotidine (PEPCID) tablet 20 mg, BID PRN    fluticasone (FLONASE) 50 mcg/act nasal spray 1 spray, BID    insulin glargine (LANTUS) subcutaneous injection 10 Units 0.1 mL, QAM    insulin lispro (HumALOG/ADMELOG) 100 units/mL subcutaneous injection 2-12 Units, TID AC **AND** Fingerstick Glucose (POCT), TID AC    insulin lispro (HumALOG/ADMELOG) 100 units/mL subcutaneous injection 5 Units, TID With Meals    loratadine (CLARITIN) tablet 10 mg, Daily    magnesium Oxide (MAG-OX) tablet 400 mg, BID    methocarbamol (ROBAXIN) tablet 500 mg, Q6H PRN    metoprolol succinate (TOPROL-XL) 24 hr tablet 50 mg, Daily    montelukast (SINGULAIR) tablet 10 mg, HS    nystatin (MYCOSTATIN) powder, BID    potassium chloride (Klor-Con M20) CR tablet 40 mEq, TID With Meals    pregabalin (LYRICA) capsule 50 mg, HS    spironolactone (ALDACTONE) tablet 25 mg, Daily    Administrative Statements   Today, Patient Was Seen By: Meghan Paul MD      **Please Note: This note may have been constructed using a voice recognition system.**

## 2025-03-19 NOTE — ASSESSMENT & PLAN NOTE
Lab Results   Component Value Date    EGFR 28 03/19/2025    EGFR 26 03/18/2025    EGFR 27 03/17/2025    CREATININE 2.45 (H) 03/19/2025    CREATININE 2.59 (H) 03/18/2025    CREATININE 2.52 (H) 03/17/2025     Baseline creatinine seems to be from 1.5-1.8  Diuresis as above  Creatinine improving  Discussed with patient prolonged renal recovery  Repeat BMP in the morning

## 2025-03-19 NOTE — PROGRESS NOTES
Progress Note - Heart Failure   Name: Mesfin Cano 59 y.o. male I MRN: [id number]  Unit/Bed#: [facility] 510-01 I Date of Admission: 3/11/2025   Date of Service: 3/19/2025 I Hospital Day: 8    Assessment & Plan  CHF, acute on chronic (HCC)  Wt Readings from Last 3 Encounters:   03/19/25 (!) 141 kg (310 lb 1.6 oz)   03/10/25 (!) 151 kg (333 lb 3.2 oz)   01/31/25 (!) 143 kg (315 lb)     Patient with history of HFpEF, Stage C, NYHA III  Last echo 6/2024 which showed concentric LV hypertrophy with EF 55%, normal systolic functoin. LA moderately dilated. Akinetic basal inferolateral and mid inferolateral region.   Had CardioMEMS device implanted 3/2022, most recent readings: dPA 3/11 16 > 16> 14> 12> 15 > 15 -- of which are above goal (10-12)    Approximate 17lb weight gain over past two weeks, with elevated BNP on admission at 319. CXR without evidence of vascular congestion or pulmonary edema. Was due for f/u HF outpatient 3/6 but did not show to the appointment. Frequent admissions every 1-2 months of recent. Had been taking metolazone 5mg daily since Monday 3/10, normally takes three times per week. Received x1 Bumex 2mg IV in ED before being started on Bumex gtt via admitting team. Transitioned from bumex gtt to oral diuretics on 3/17.    GDMT :  --Beta Blocker: Metoprolol succinate 50 mg daily.    --ARNi / ACEi / ARB: None currently  --Aldosterone Antagonist: Spironolactone 25mg daily  --SGLT2 Inhibitor: Jardiance 10mg daily  --Home Diuretic: Bumex 6mg TID with metolazone 5mg TIW    Plan:  Continue metoprolol 50mg daily  Continue aldactone 25mg daily, can consider increasing dose for potassium sparing support if kidney function allows  Continue jardiance 10mg daily  S/p metolazone 2.5mg x1 3/13, 3/14  Continue with PO 6mg Bumex TID  Continue metolazone 5mg three times per week T/TH/SAT  Hold Th 3/20 dose, next dose to be given Sat 3/22  Ok for discharge from HF standpoint  We will arrange outpatient follow-up for early  next week with repeat BMP  Patient agreeable to send in cardioMEMS reading to office when he is discharged  BMP/mag -- follow electrolytes for repletion  At discharge: Kdur 40 mEq TID, MagOx 400 BID  Paroxysmal atrial fibrillation (HCC)  [id number] score of 3 (HTN, HF, DM)  BB, eliquis at home    Plan:  Continue beta blocker as above  Continue Eliquis   Acute renal failure superimposed on stage 3b chronic kidney disease ([facility])  Lab Results   Component Value Date    EGFR 28 03/19/2025    EGFR 26 03/18/2025    EGFR 27 03/17/2025    CREATININE 2.45 (H) 03/19/2025    CREATININE 2.59 (H) 03/18/2025    CREATININE 2.52 (H) 03/17/2025     Baseline has historically been 1.5-2  Has been uptrending in the setting of IV diuresis. Continue to monitor upon transition to oral regimen.  Type 2 diabetes mellitus without complication, without long-term current use of insulin ([facility])  Lab Results   Component Value Date    HGBA1C 6.3 03/10/2025       Recent Labs     03/18/25  1537 03/18/25  2026 03/19/25  0551 03/19/25  1049   POCGLU 197* 206* 220* 232*       Blood Sugar Average: Last 72 hrs:  (P) 228.0740015376531790    Controlled but is risk factor. Management per primary team.     Subjective   Chief Complaint: SOB, weight gain    Patient concerned that kidney function has not yet returned to baseline. We discussed at bedside with him and our team that this may take some time for it to recover to baseline and he may be approaching new baseline as well. He otherwise feels well and has no chest pain, palpitations, dizziness. Has been up walking around and endorses that his breathing is back to baseline    Objective :  Temp:  [97.6 °F (36.4 °C)-98.9 °F (37.2 °C)] 97.6 °F (36.4 °C)  HR:  [76-90] 84  BP: (109-120)/(64-80) 113/80  Resp:  [14-25] 14  SpO2:  [94 %-96 %] 95 %  O2 Device: None (Room air)  Orthostatic Blood Pressures      Flowsheet Row Most Recent Value   Blood Pressure 113/80 filed at 03/19/2025 1058   Patient Position -  Orthostatic VS Sitting filed at 03/19/2025 1058          First Weight: Weight - Scale: (!) 150 kg (331 lb 5.6 oz) (03/12/25 1045)  Vitals:    03/18/25 0500 03/19/25 0500   Weight: (!) 141 kg (310 lb 1.6 oz) (!) 141 kg (310 lb 1.6 oz)     Physical Exam  Vitals and nursing note reviewed.   Constitutional:       General: He is not in acute distress.     Appearance: He is well-developed.   HENT:      Head: Normocephalic and atraumatic.   Eyes:      Conjunctiva/sclera: Conjunctivae normal.   Cardiovascular:      Rate and Rhythm: Normal rate and regular rhythm.      Heart sounds: No murmur heard.  Pulmonary:      Effort: Pulmonary effort is normal. No respiratory distress.      Breath sounds: Normal breath sounds.   Abdominal:      Palpations: Abdomen is soft.      Tenderness: There is no abdominal tenderness.   Musculoskeletal:      Cervical back: Neck supple.      Right lower leg: Edema (non-pitting) present.      Left lower leg: Edema (non-pitting) present.   Skin:     General: Skin is warm and dry.      Capillary Refill: Capillary refill takes less than 2 seconds.   Neurological:      General: No focal deficit present.      Mental Status: He is alert and oriented to person, place, and time.   Psychiatric:         Mood and Affect: Mood normal.           Lab Results: I have reviewed the following results:  Results from last 7 days   Lab Units 03/18/25  0545   WBC Thousand/uL 8.49   HEMOGLOBIN g/dL 11.7*   HEMATOCRIT % 34.9*   PLATELETS Thousands/uL 249     Results from last 7 days   Lab Units 03/19/25  0746 03/18/25  0545 03/17/25  0605   POTASSIUM mmol/L 3.5 3.4* 3.3*   CHLORIDE mmol/L 87* 84* 83*   CO2 mmol/L 30 33* 37*   BUN mg/dL 59* 55* 48*   CREATININE mg/dL 2.45* 2.59* 2.52*   CALCIUM mg/dL 7.4* 7.5* 7.9*         Lab Results   Component Value Date    HGBA1C 6.3 03/10/2025     Lab Results   Component Value Date    TROPONINI <0.02 09/22/2021       VTE Pharmacologic Prophylaxis: Eliquis  VTE Mechanical Prophylaxis:  sequential compression device    Ilda Williamson DO  [facility]  Internal Medicine Residency, PGY-2

## 2025-03-19 NOTE — ASSESSMENT & PLAN NOTE
Wt Readings from Last 3 Encounters:   03/19/25 (!) 141 kg (310 lb 1.6 oz)   03/10/25 (!) 151 kg (333 lb 3.2 oz)   01/31/25 (!) 143 kg (315 lb)     Presented to the ED with lower extremity swelling and shortness of breath, gained about 18 pounds from his dry weight  Home regimen is Bumex 6 mg 3 times daily  s/p metolazone 3/13 and 3/14  Continue metoprolol, spironolactone, Jardiance  Bumex drip dced on 3/17, patient started on Bumex 6 mg p.o. 3 times daily on 3/18 and has been tolerating well with stable weight  Continue metolazone 5mg three times per week T/TH/SAT, next on 3/22  Daily weights, intake and output, fluid restriction, low-salt diet  Discussed with heart failure team.  Plan for outpatient heart failure follow-up

## 2025-03-19 NOTE — ASSESSMENT & PLAN NOTE
Wt Readings from Last 3 Encounters:   03/19/25 (!) 141 kg (310 lb 1.6 oz)   03/10/25 (!) 151 kg (333 lb 3.2 oz)   01/31/25 (!) 143 kg (315 lb)     Patient with history of HFpEF, Stage C, NYHA III  Last echo 6/2024 which showed concentric LV hypertrophy with EF 55%, normal systolic functoin. LA moderately dilated. Akinetic basal inferolateral and mid inferolateral region.   Had CardioMEMS device implanted 3/2022, most recent readings: dPA 3/11 16 > 16> 14> 12> 15 > 15 -- of which are above goal (10-12)    Approximate 17lb weight gain over past two weeks, with elevated BNP on admission at 319. CXR without evidence of vascular congestion or pulmonary edema. Was due for f/u HF outpatient 3/6 but did not show to the appointment. Frequent admissions every 1-2 months of recent. Had been taking metolazone 5mg daily since Monday 3/10, normally takes three times per week. Received x1 Bumex 2mg IV in ED before being started on Bumex gtt via admitting team. Transitioned from bumex gtt to oral diuretics on 3/17.    GDMT :  --Beta Blocker: Metoprolol succinate 50 mg daily.    --ARNi / ACEi / ARB: None currently  --Aldosterone Antagonist: Spironolactone 25mg daily  --SGLT2 Inhibitor: Jardiance 10mg daily  --Home Diuretic: Bumex 6mg TID with metolazone 5mg TIW    Plan:  Continue metoprolol 50mg daily  Continue aldactone 25mg daily, can consider increasing dose for potassium sparing support if kidney function allows  Continue jardiance 10mg daily  S/p metolazone 2.5mg x1 3/13, 3/14  Continue with PO 6mg Bumex TID  Continue metolazone 5mg three times per week T/TH/SAT  Hold Th 3/20 dose, next dose to be given Sat 3/22  Ok for discharge from HF standpoint  We will arrange outpatient follow-up for early next week with repeat BMP  Patient agreeable to send in cardioMEMS reading to office when he is discharged  BMP/mag -- follow electrolytes for repletion  At discharge: Kdur 40 mEq TID, MagOx 400 BID

## 2025-03-19 NOTE — ASSESSMENT & PLAN NOTE
[id number] score of 3 (HTN, HF, DM)  BB, eliquis at home    Plan:  Continue beta blocker as above  Continue Eliquis

## 2025-03-19 NOTE — ASSESSMENT & PLAN NOTE
Lab Results   Component Value Date    HGBA1C 6.3 03/10/2025       Recent Labs     03/18/25  1537 03/18/25 2026 03/19/25  0551 03/19/25  1049   POCGLU 197* 206* 220* 232*       Blood Sugar Average: Last 72 hrs:  (P) 228.8877050283419128    Controlled but is risk factor. Management per primary team.

## 2025-03-19 NOTE — ASSESSMENT & PLAN NOTE
Lab Results   Component Value Date    EGFR 28 03/19/2025    EGFR 26 03/18/2025    EGFR 27 03/17/2025    CREATININE 2.45 (H) 03/19/2025    CREATININE 2.59 (H) 03/18/2025    CREATININE 2.52 (H) 03/17/2025     Baseline has historically been 1.5-2  Has been uptrending in the setting of IV diuresis. Continue to monitor upon transition to oral regimen.

## 2025-03-19 NOTE — ASSESSMENT & PLAN NOTE
Lab Results   Component Value Date    HGBA1C 6.3 03/10/2025       Recent Labs     03/18/25  1102 03/18/25  1537 03/18/25 2026 03/19/25  0551   POCGLU 246* 197* 206* 220*       Blood Sugar Average: Last 72 hrs:  (P) 227.75  PTA sitagliptin 50 mg daily  Continue Lantus 12 units in the morning, start lispro 5 units 3 times daily with insulin sliding scale  Continue Jardiance 10 mg daily  Further adjust insulin if needed  Monitor blood sugar

## 2025-03-20 ENCOUNTER — TELEPHONE (OUTPATIENT)
Dept: CARDIOLOGY CLINIC | Facility: CLINIC | Age: 60
End: 2025-03-20

## 2025-03-20 ENCOUNTER — TRANSITIONAL CARE MANAGEMENT (OUTPATIENT)
Dept: FAMILY MEDICINE CLINIC | Facility: CLINIC | Age: 60
End: 2025-03-20

## 2025-03-20 VITALS
TEMPERATURE: 98.3 F | DIASTOLIC BLOOD PRESSURE: 86 MMHG | HEART RATE: 84 BPM | OXYGEN SATURATION: 97 % | WEIGHT: 309.97 LBS | HEIGHT: 73 IN | SYSTOLIC BLOOD PRESSURE: 139 MMHG | BODY MASS INDEX: 41.08 KG/M2 | RESPIRATION RATE: 18 BRPM

## 2025-03-20 LAB
ANION GAP SERPL CALCULATED.3IONS-SCNC: 12 MMOL/L (ref 4–13)
BUN SERPL-MCNC: 60 MG/DL (ref 5–25)
CALCIUM SERPL-MCNC: 7.3 MG/DL (ref 8.4–10.2)
CHLORIDE SERPL-SCNC: 91 MMOL/L (ref 96–108)
CO2 SERPL-SCNC: 33 MMOL/L (ref 21–32)
CREAT SERPL-MCNC: 2.41 MG/DL (ref 0.6–1.3)
GFR SERPL CREATININE-BSD FRML MDRD: 28 ML/MIN/1.73SQ M
GLUCOSE SERPL-MCNC: 171 MG/DL (ref 65–140)
GLUCOSE SERPL-MCNC: 199 MG/DL (ref 65–140)
GLUCOSE SERPL-MCNC: 265 MG/DL (ref 65–140)
POTASSIUM SERPL-SCNC: 3.4 MMOL/L (ref 3.5–5.3)
SODIUM SERPL-SCNC: 136 MMOL/L (ref 135–147)

## 2025-03-20 PROCEDURE — 82948 REAGENT STRIP/BLOOD GLUCOSE: CPT

## 2025-03-20 PROCEDURE — 99232 SBSQ HOSP IP/OBS MODERATE 35: CPT | Performed by: INTERNAL MEDICINE

## 2025-03-20 PROCEDURE — 80048 BASIC METABOLIC PNL TOTAL CA: CPT | Performed by: STUDENT IN AN ORGANIZED HEALTH CARE EDUCATION/TRAINING PROGRAM

## 2025-03-20 PROCEDURE — 99239 HOSP IP/OBS DSCHRG MGMT >30: CPT | Performed by: STUDENT IN AN ORGANIZED HEALTH CARE EDUCATION/TRAINING PROGRAM

## 2025-03-20 RX ORDER — LANOLIN ALCOHOL/MO/W.PET/CERES
400 CREAM (GRAM) TOPICAL 2 TIMES DAILY
Qty: 60 TABLET | Refills: 0 | Status: SHIPPED | OUTPATIENT
Start: 2025-03-20

## 2025-03-20 RX ORDER — POTASSIUM CHLORIDE 1500 MG/1
40 TABLET, EXTENDED RELEASE ORAL
Start: 2025-03-20

## 2025-03-20 RX ADMIN — INSULIN LISPRO 2 UNITS: 100 INJECTION, SOLUTION INTRAVENOUS; SUBCUTANEOUS at 09:11

## 2025-03-20 RX ADMIN — INSULIN LISPRO 5 UNITS: 100 INJECTION, SOLUTION INTRAVENOUS; SUBCUTANEOUS at 11:30

## 2025-03-20 RX ADMIN — ATORVASTATIN CALCIUM 10 MG: 10 TABLET, FILM COATED ORAL at 09:08

## 2025-03-20 RX ADMIN — METOPROLOL SUCCINATE 50 MG: 50 TABLET, EXTENDED RELEASE ORAL at 09:08

## 2025-03-20 RX ADMIN — BUMETANIDE 6 MG: 2 TABLET ORAL at 09:07

## 2025-03-20 RX ADMIN — BUDESONIDE AND FORMOTEROL FUMARATE DIHYDRATE 2 PUFF: 160; 4.5 AEROSOL RESPIRATORY (INHALATION) at 09:09

## 2025-03-20 RX ADMIN — POTASSIUM CHLORIDE 40 MEQ: 1500 TABLET, EXTENDED RELEASE ORAL at 08:00

## 2025-03-20 RX ADMIN — APIXABAN 5 MG: 5 TABLET, FILM COATED ORAL at 09:08

## 2025-03-20 RX ADMIN — FLUTICASONE PROPIONATE 1 SPRAY: 50 SPRAY, METERED NASAL at 09:09

## 2025-03-20 RX ADMIN — INSULIN GLARGINE 12 UNITS: 100 INJECTION, SOLUTION SUBCUTANEOUS at 09:12

## 2025-03-20 RX ADMIN — LORATADINE 10 MG: 10 TABLET ORAL at 09:08

## 2025-03-20 RX ADMIN — INSULIN LISPRO 5 UNITS: 100 INJECTION, SOLUTION INTRAVENOUS; SUBCUTANEOUS at 09:11

## 2025-03-20 RX ADMIN — EMPAGLIFLOZIN 10 MG: 10 TABLET, FILM COATED ORAL at 09:10

## 2025-03-20 RX ADMIN — SPIRONOLACTONE 25 MG: 25 TABLET, FILM COATED ORAL at 09:08

## 2025-03-20 RX ADMIN — MAGNESIUM OXIDE TAB 400 MG (241.3 MG ELEMENTAL MG) 400 MG: 400 (241.3 MG) TAB at 09:08

## 2025-03-20 RX ADMIN — POTASSIUM CHLORIDE 40 MEQ: 1500 TABLET, EXTENDED RELEASE ORAL at 11:31

## 2025-03-20 RX ADMIN — CYANOCOBALAMIN TAB 500 MCG 1000 MCG: 500 TAB at 09:07

## 2025-03-20 RX ADMIN — INSULIN LISPRO 6 UNITS: 100 INJECTION, SOLUTION INTRAVENOUS; SUBCUTANEOUS at 11:30

## 2025-03-20 NOTE — ASSESSMENT & PLAN NOTE
Lab Results   Component Value Date    HGBA1C 6.3 03/10/2025       Recent Labs     03/19/25  1049 03/19/25  1549 03/19/25  2030 03/20/25  0610   POCGLU 232* 199* 237* 199*       Blood Sugar Average: Last 72 hrs:  (P) 219.0451726571932612

## 2025-03-20 NOTE — ASSESSMENT & PLAN NOTE
Wt Readings from Last 3 Encounters:   03/20/25 (!) 141 kg (309 lb 15.5 oz)   03/10/25 (!) 151 kg (333 lb 3.2 oz)   01/31/25 (!) 143 kg (315 lb)     Patient with history of HFpEF, Stage C, NYHA III  Last echo 6/2024 which showed concentric LV hypertrophy with EF 55%, normal systolic functoin. LA moderately dilated. Akinetic basal inferolateral and mid inferolateral region.   Had CardioMEMS device implanted 3/2022, most recent readings: dPA 3/11 16 > 16> 14> 12> 15 > 15 -- of which are above goal (10-12)    Approximate 17lb weight gain over past two weeks, with elevated BNP on admission at 319. CXR without evidence of vascular congestion or pulmonary edema. Was due for f/u HF outpatient 3/6 but did not show to the appointment. Frequent admissions every 1-2 months of recent. Had been taking metolazone 5mg daily since Monday 3/10, normally takes three times per week. Received x1 Bumex 2mg IV in ED before being started on Bumex gtt via admitting team. Transitioned from bumex gtt to oral diuretics on 3/17.    GDMT :  --Beta Blocker: Metoprolol succinate 50 mg daily.    --ARNi / ACEi / ARB: None currently  --Aldosterone Antagonist: Spironolactone 25mg daily  --SGLT2 Inhibitor: Jardiance 10mg daily  --Home Diuretic: Bumex 6mg TID with metolazone 5mg TIW    Plan:  Continue metoprolol 50mg daily  Continue aldactone 25mg daily, can consider increasing dose for potassium sparing support if kidney function allows  Continue jardiance 10mg daily  S/p metolazone 2.5mg x1 3/13, 3/14  Continue with PO 6mg Bumex TID  Continue metolazone 5mg three times per week T/TH/SAT  Hold Th 3/20 dose, next dose to be given Sat 3/22  Ok for discharge from HF standpoint  Outpatient follow-up: 3/25/25 at 8am @ [address] office  Patient agreeable to send in cardioMEMS reading to office when he is discharged  BMP/mag -- follow electrolytes for repletion  At discharge: Kdur 40 mEq TID, MagOx 400 BID  Repeat BMP expected Monday 3/24 prior to hospital  f/u

## 2025-03-20 NOTE — ASSESSMENT & PLAN NOTE
[id number] score of 3 (HTN, HF, DM)  BB, eliquis at home    Plan:  Continue beta blocker as above  Continue Eliquis

## 2025-03-20 NOTE — CASE MANAGEMENT
Case Management Progress Note    Patient name Mesfin Cano  [facility] [id number]  : 1965 Date 3/20/2025       LOS (days): 9  Geometric Mean LOS (GMLOS) (days):   Days to GMLOS:        OBJECTIVE:        Current admission status: Inpatient  Preferred Pharmacy:   [id number] - [place], [address] [address]  Phone: [phone] Fax: [phone]    Primary Care Provider: RODRIGUE Hodge    Primary Insurance: BLUE CROSS  Secondary Insurance:     PROGRESS NOTE:  Pt is medically cleared for d/c  No d/c needs evaluated  Number for [facility] provided to nurse

## 2025-03-20 NOTE — PROGRESS NOTES
Progress Note - Heart Failure   Name: Mesfin Cano 59 y.o. male I MRN: [id number]  Unit/Bed#: [facility] 510-01 I Date of Admission: 3/11/2025   Date of Service: 3/20/2025 I Hospital Day: 9    Assessment & Plan  CHF, acute on chronic (HCC)  Wt Readings from Last 3 Encounters:   03/20/25 (!) 141 kg (309 lb 15.5 oz)   03/10/25 (!) 151 kg (333 lb 3.2 oz)   01/31/25 (!) 143 kg (315 lb)     Patient with history of HFpEF, Stage C, NYHA III  Last echo 6/2024 which showed concentric LV hypertrophy with EF 55%, normal systolic functoin. LA moderately dilated. Akinetic basal inferolateral and mid inferolateral region.   Had CardioMEMS device implanted 3/2022, most recent readings: dPA 3/11 16 > 16> 14> 12> 15 > 15 -- of which are above goal (10-12)    Approximate 17lb weight gain over past two weeks, with elevated BNP on admission at 319. CXR without evidence of vascular congestion or pulmonary edema. Was due for f/u HF outpatient 3/6 but did not show to the appointment. Frequent admissions every 1-2 months of recent. Had been taking metolazone 5mg daily since Monday 3/10, normally takes three times per week. Received x1 Bumex 2mg IV in ED before being started on Bumex gtt via admitting team. Transitioned from bumex gtt to oral diuretics on 3/17.    GDMT :  --Beta Blocker: Metoprolol succinate 50 mg daily.    --ARNi / ACEi / ARB: None currently  --Aldosterone Antagonist: Spironolactone 25mg daily  --SGLT2 Inhibitor: Jardiance 10mg daily  --Home Diuretic: Bumex 6mg TID with metolazone 5mg TIW    Plan:  Continue metoprolol 50mg daily  Continue aldactone 25mg daily, can consider increasing dose for potassium sparing support if kidney function allows  Continue jardiance 10mg daily  S/p metolazone 2.5mg x1 3/13, 3/14  Continue with PO 6mg Bumex TID  Continue metolazone 5mg three times per week T/TH/SAT  Hold Th 3/20 dose, next dose to be given Sat 3/22  Ok for discharge from HF standpoint  Outpatient follow-up: 3/25/25 at 8am @ [address]  office  Patient agreeable to send in cardioMEMS reading to office when he is discharged  BMP/mag -- follow electrolytes for repletion  At discharge: Kdur 40 mEq TID, MagOx 400 BID  Repeat BMP expected Monday 3/24 prior to hospital f/u  Paroxysmal atrial fibrillation (HCC)  [id number] score of 3 (HTN, HF, DM)  BB, eliquis at home    Plan:  Continue beta blocker as above  Continue Eliquis   Acute renal failure superimposed on stage 3b chronic kidney disease (HCC)  Lab Results   Component Value Date    EGFR 28 03/20/2025    EGFR 28 03/19/2025    EGFR 26 03/18/2025    CREATININE 2.41 (H) 03/20/2025    CREATININE 2.45 (H) 03/19/2025    CREATININE 2.59 (H) 03/18/2025     Baseline has historically been 1.5-2  Has been uptrending in the setting of IV diuresis. Continue to monitor upon transition to oral regimen.  Type 2 diabetes mellitus without complication, without long-term current use of insulin ([facility])  Lab Results   Component Value Date    HGBA1C 6.3 03/10/2025       Recent Labs     03/19/25  1049 03/19/25  1549 03/19/25  2030 03/20/25  0610   POCGLU 232* 199* 237* 199*       Blood Sugar Average: Last 72 hrs:  (P) 219.5812914364232794    Controlled but is risk factor. Management per primary team.   Diabetic polyneuropathy associated with type 2 diabetes mellitus ([facility])  Lab Results   Component Value Date    HGBA1C 6.3 03/10/2025       Recent Labs     03/19/25  1049 03/19/25  1549 03/19/25 2030 03/20/25  0610   POCGLU 232* 199* 237* 199*       Blood Sugar Average: Last 72 hrs:  (P) 219.4162242761261136      Subjective   Chief Complaint: SOB, weight gain    Patient agreeable of plan for discharge today. Feels well. Understands current doses of medications for discharge and plan for follow-up. No other acute complaints.     Objective :  Temp:  [97.6 °F (36.4 °C)-98.3 °F (36.8 °C)] 98.3 °F (36.8 °C)  HR:  [75-84] 84  BP: (100-139)/(64-86) 139/86  Resp:  [14-18] 18  SpO2:  [95 %-98 %] 97 %  O2 Device: None (Room  air)  Orthostatic Blood Pressures      Flowsheet Row Most Recent Value   Blood Pressure 139/86 filed at 03/20/2025 0741   Patient Position - Orthostatic VS Sitting filed at 03/19/2025 1447          First Weight: Weight - Scale: (!) 150 kg (331 lb 5.6 oz) (03/12/25 1045)  Vitals:    03/19/25 0500 03/20/25 0500   Weight: (!) 141 kg (310 lb 1.6 oz) (!) 141 kg (309 lb 15.5 oz)     Physical Exam  Vitals and nursing note reviewed.   Constitutional:       General: He is not in acute distress.     Appearance: He is well-developed. He is obese.   HENT:      Head: Normocephalic and atraumatic.   Eyes:      Conjunctiva/sclera: Conjunctivae normal.   Cardiovascular:      Rate and Rhythm: Normal rate and regular rhythm.      Heart sounds: No murmur heard.  Pulmonary:      Effort: Pulmonary effort is normal. No respiratory distress.      Breath sounds: Normal breath sounds. No wheezing, rhonchi or rales.   Abdominal:      Palpations: Abdomen is soft.      Tenderness: There is no abdominal tenderness.   Musculoskeletal:      Cervical back: Neck supple.      Right lower leg: Edema (non-pitting) present.      Left lower leg: Edema (non-pitting) present.   Skin:     General: Skin is warm and dry.      Capillary Refill: Capillary refill takes less than 2 seconds.   Neurological:      General: No focal deficit present.      Mental Status: He is alert and oriented to person, place, and time.   Psychiatric:         Mood and Affect: Mood normal.           Lab Results: I have reviewed the following results:  Results from last 7 days   Lab Units 03/18/25  0545   WBC Thousand/uL 8.49   HEMOGLOBIN g/dL 11.7*   HEMATOCRIT % 34.9*   PLATELETS Thousands/uL 249     Results from last 7 days   Lab Units 03/20/25  0443 03/19/25  0746 03/18/25  0545   POTASSIUM mmol/L 3.4* 3.5 3.4*   CHLORIDE mmol/L 91* 87* 84*   CO2 mmol/L 33* 30 33*   BUN mg/dL 60* 59* 55*   CREATININE mg/dL 2.41* 2.45* 2.59*   CALCIUM mg/dL 7.3* 7.4* 7.5*         Lab Results    Component Value Date    HGBA1C 6.3 03/10/2025     Lab Results   Component Value Date    TROPONINI <0.02 09/22/2021       VTE Pharmacologic Prophylaxis: Eliquis  VTE Mechanical Prophylaxis: sequential compression device    Ilda Williamson DO  [facility]  Internal Medicine Residency, PGY-2

## 2025-03-20 NOTE — TELEPHONE ENCOUNTER
Patient was hospitalized 3/11/25 - 3/20/25.  Patient called office today to receive his [employer] reading number, which is 12  & his goal.     Self-management/education and teach back:  Primary learner: Patient  Has patient given verbal consent for a virtual RN visit (for trial only, for 48 hour discharge call only):   Patient wanted to speak on the phone & denied a nurse virtual visit    Following low sodium diet: Yes  Following fluid restriction: 2 L/day  Hospital discharge weight: 310 lb  Weighing daily: 310 lb           Recording: Yes, plans to record daily weight.  1st home weight 310 lb         Weight today: 310 lb  Monitoring symptoms: Yes  Any current symptoms: baseline exertional dyspnea  Knows when to call provider: Yes  Medications reviewed and taking all as prescribed: Yes  Knows name of diuretic: Yes, Bumex  Escalation plan:     Care Coordination:  Aware of heart failure hospital follow up and/or future cardiology follow up appointments: 3/25/25  Aware of PCP follow up appointment:   Not currently scheduled  Transportation: Drives self  Social Support:  Insurance/financial concerns:  Home health care:   Health literacy:  Engagement:    Additional comments:

## 2025-03-20 NOTE — ASSESSMENT & PLAN NOTE
Lab Results   Component Value Date    EGFR 28 03/20/2025    EGFR 28 03/19/2025    EGFR 26 03/18/2025    CREATININE 2.41 (H) 03/20/2025    CREATININE 2.45 (H) 03/19/2025    CREATININE 2.59 (H) 03/18/2025     Baseline has historically been 1.5-2  Has been uptrending in the setting of IV diuresis. Continue to monitor upon transition to oral regimen.

## 2025-03-20 NOTE — DISCHARGE INSTR - AVS FIRST PAGE
Next metolazone dose on Sat 3/22  Send CardioMEMS reading to Maria Luisa at Cardiology office when you are discharged  Continue taking 6mg PO Bumex TID  Heart Failure follow-up appt: 3/25/25 8am [address] [facility] with Maria Fernanda Daily. [address], [place] [address]

## 2025-03-20 NOTE — ASSESSMENT & PLAN NOTE
Lab Results   Component Value Date    HGBA1C 6.3 03/10/2025       Recent Labs     03/19/25  1049 03/19/25  1549 03/19/25  2030 03/20/25  0610   POCGLU 232* 199* 237* 199*       Blood Sugar Average: Last 72 hrs:  (P) [id number]    Controlled but is risk factor. Management per primary team.

## 2025-03-21 ENCOUNTER — PATIENT OUTREACH (OUTPATIENT)
Dept: CARDIOLOGY CLINIC | Facility: CLINIC | Age: 60
End: 2025-03-21

## 2025-03-21 NOTE — PROGRESS NOTES
Received ADT that pt discharged home from [facility] on 3/20/25.   [facility] Follow-up is scheduled on 3/25/25 with ISAIAS MILTON.    Closed social work episode.

## 2025-03-21 NOTE — UTILIZATION REVIEW
NOTIFICATION OF ADMISSION DISCHARGE   This is a Notification of Discharge from [facility]. Please be advised that this patient has been discharge from our facility. Below you will find the admission and discharge date and time including the patient’s disposition.   UTILIZATION REVIEW CONTACT:  Bayron Candelaria  Utilization   Network Utilization Review Department  Phone: [phone] x carefully listen to the prompts. All voicemails are confidential.  Email: [email or web address]     ADMISSION INFORMATION  PRESENTATION DATE: 3/11/2025  8:48 PM  OBERVATION ADMISSION DATE: N/A  INPATIENT ADMISSION DATE: 3/11/25 11:48 PM   DISCHARGE DATE: 3/20/2025 12:23 PM   DISPOSITION:Home/Self Care    Network Utilization Review Department  ATTENTION: Please call with any questions or concerns to [phone] and carefully listen to the prompts so that you are directed to the right person. All voicemails are confidential.   For Discharge needs, contact Care Management DC Support Team at [phone] opt. 2  Send all requests for admission clinical reviews, approved or denied determinations and any other requests to dedicated fax number below belonging to the campus where the patient is receiving treatment. List of dedicated fax numbers for the Facilities:  FACILITY NAME UR FAX NUMBER   ADMISSION DENIALS (Administrative/Medical Necessity) [phone]   DISCHARGE SUPPORT TEAM ([facility]) [phone]   PARENT CHILD HEALTH (Maternity/NICU/Pediatrics) [phone]   [facility] [phone]   [facility] [phone]   [facility] [phone]   [facility] [phone]   [facility] [phone]   [facility] [phone]   [facility] [phone]   [facility] [phone]   [facility]  [facility] [phone]   [facility] [phone]   [facility] [phone]   [facility] [phone]

## 2025-03-23 NOTE — ASSESSMENT & PLAN NOTE
Lab Results   Component Value Date    EGFR 28 03/20/2025    EGFR 28 03/19/2025    EGFR 26 03/18/2025    CREATININE 2.41 (H) 03/20/2025    CREATININE 2.45 (H) 03/19/2025    CREATININE 2.59 (H) 03/18/2025     Baseline creatinine seems to be from 1.5-1.8  Diuresis as above  Creatinine improving  Discussed with patient prolonged renal recovery  Outpatient repeat BMP

## 2025-03-23 NOTE — ASSESSMENT & PLAN NOTE
"Lab Results   Component Value Date    HGBA1C 6.3 03/10/2025       No results for input(s): \"POCGLU\" in the last 72 hours.      Blood Sugar Average: Last 72 hrs:  (P) 232  PTA sitagliptin 50 mg daily and Jardiance 10 mg daily  Outpatient PCP follow-up  "

## 2025-03-23 NOTE — ASSESSMENT & PLAN NOTE
Wt Readings from Last 3 Encounters:   03/20/25 (!) 141 kg (309 lb 15.5 oz)   03/10/25 (!) 151 kg (333 lb 3.2 oz)   01/31/25 (!) 143 kg (315 lb)     Presented to the ED with lower extremity swelling and shortness of breath, gained about 18 pounds from his dry weight  Home regimen is Bumex 6 mg 3 times daily  s/p metolazone 3/13 and 3/14  Continue metoprolol, spironolactone, Jardiance  Bumex drip dced on 3/17, patient started on Bumex 6 mg p.o. 3 times daily on 3/18 and has been tolerating well with stable weight  Continue metolazone 5mg three times per week T/TH/SAT, next on 3/22  Discussed with heart failure team.  Plan for outpatient heart failure follow-up

## 2025-03-23 NOTE — DISCHARGE SUMMARY
"Discharge Summary - Hospitalist   Name: Mesfin Cano 59 y.o. male I MRN: [id number]  Unit/Bed#: [facility] 510-01 I Date of Admission: 3/11/2025   Date of Service: 3/23/2025 I Hospital Day: 9     Assessment & Plan  CHF, acute on chronic (HCC)  Wt Readings from Last 3 Encounters:   03/20/25 (!) 141 kg (309 lb 15.5 oz)   03/10/25 (!) 151 kg (333 lb 3.2 oz)   01/31/25 (!) 143 kg (315 lb)     Presented to the ED with lower extremity swelling and shortness of breath, gained about 18 pounds from his dry weight  Home regimen is Bumex 6 mg 3 times daily  s/p metolazone 3/13 and 3/14  Continue metoprolol, spironolactone, Jardiance  Bumex drip dced on 3/17, patient started on Bumex 6 mg p.o. 3 times daily on 3/18 and has been tolerating well with stable weight  Continue metolazone 5mg three times per week T/TH/SAT, next on 3/22  Discussed with heart failure team.  Plan for outpatient heart failure follow-up  Paroxysmal atrial fibrillation (HCC)  Continue metoprolol succinate 50 mg daily  Continue Eliquis 5 mg twice daily  Heart rate control  Acute renal failure superimposed on stage 3b chronic kidney disease (HCC)  Lab Results   Component Value Date    EGFR 28 03/20/2025    EGFR 28 03/19/2025    EGFR 26 03/18/2025    CREATININE 2.41 (H) 03/20/2025    CREATININE 2.45 (H) 03/19/2025    CREATININE 2.59 (H) 03/18/2025     Baseline creatinine seems to be from 1.5-1.8  Diuresis as above  Creatinine improving  Discussed with patient prolonged renal recovery  Outpatient repeat BMP  Type 2 diabetes mellitus without complication, without long-term current use of insulin ([facility])  Lab Results   Component Value Date    HGBA1C 6.3 03/10/2025       No results for input(s): \"POCGLU\" in the last 72 hours.      Blood Sugar Average: Last 72 hrs:  (P) 232  PTA sitagliptin 50 mg daily and Jardiance 10 mg daily  Outpatient PCP follow-up  Diabetic polyneuropathy associated with type 2 diabetes mellitus (HCC)  Continue home Lyrica     Medical Problems       " "Resolved Problems  Date Reviewed: 3/17/2025   None       Discharging Physician / Practitioner: Meghan Paul MD  PCP: RODRIGUE Hodge  Admission Date:   Admission Orders (From admission, onward)       Ordered        03/11/25 2348  INPATIENT ADMISSION  Once                          Discharge Date: 03/20/25    Consultations During Hospital Stay:  Heart failure    Procedures Performed:   None    Significant Findings / Test Results:   Acute on chronic heart failure    Incidental Findings:   None    Test Results Pending at Discharge (will require follow up):   None     Outpatient Tests Requested:  None    Complications:  None    Reason for Admission: Weight gain, shortness of breath    Hospital Course:   Mesfin Cano is a 59 y.o. male patient who originally presented to the hospital on 3/11/2025 due to progressive weight gain and shortness of breath.  He was treated for acute on chronic heart failure with Bumex drip and IV diuresis per cardiology.  Weight decreased and shortness of breath resolved therefore patient was transition to oral diuretic with stable urine output.  During this hospital stay he also had elevated creatinine that slowly improved with diuresis, the time of discharge still above baseline however discussed with patient prolonged renal recovery with outpatient follow-up.  He was instructed to follow-up with PCP and cardiology for further adjustments.  Discharged in stable condition.    Please see above list of diagnoses and related plan for additional information.     Condition at Discharge: stable    Discharge Day Visit / Exam:   Subjective: No events overnight.  Patient reports feeling well this morning.  Has no shortness of breath or chest pain.  Weight has remained stable.  Vitals: Blood Pressure: 139/86 (03/20/25 0741)  Pulse: 84 (03/20/25 0741)  Temperature: 98.3 °F (36.8 °C) (03/20/25 0741)  Temp Source: Oral (03/19/25 1447)  Respirations: 18 (03/20/25 0741)  Height: 6' 1\" (185.4 cm) " (03/12/25 1210)  Weight - Scale: (!) 141 kg (309 lb 15.5 oz) (03/20/25 0500)  SpO2: 97 % (03/20/25 0741)  Physical Exam  Vitals and nursing note reviewed.   Constitutional:       General: He is not in acute distress.     Appearance: He is well-developed.   HENT:      Head: Normocephalic and atraumatic.   Eyes:      Conjunctiva/sclera: Conjunctivae normal.   Cardiovascular:      Rate and Rhythm: Normal rate and regular rhythm.   Pulmonary:      Effort: Pulmonary effort is normal. No respiratory distress.      Breath sounds: Normal breath sounds. No wheezing or rhonchi.   Musculoskeletal:      Cervical back: Neck supple.      Right lower leg: No edema.      Left lower leg: No edema.   Skin:     General: Skin is warm and dry.      Capillary Refill: Capillary refill takes less than 2 seconds.   Neurological:      Mental Status: He is alert.   Psychiatric:         Mood and Affect: Mood normal.         Behavior: Behavior normal.          Discussion with Family:  Patient to update family member.     Discharge instructions/Information to patient and family:   See after visit summary for information provided to patient and family.      Provisions for Follow-Up Care:  See after visit summary for information related to follow-up care and any pertinent home health orders.      Mobility at time of Discharge:   Basic Mobility Inpatient Raw Score: 24  JH-HLM Goal: 8: Walk 250 feet or more  JH-HLM Achieved: 8: Walk 250 feet ot more  HLM Goal achieved. Continue to encourage appropriate mobility.     Disposition:   Home    Planned Readmission: no    Discharge Medications:  See after visit summary for reconciled discharge medications provided to patient and/or family.      Administrative Statements   Discharge Statement:  I have spent a total time of 40 minutes in caring for this patient on the day of the visit/encounter. >30 minutes of time was spent on: Instructions for management, Patient and family education, Importance of tx  compliance, Risk factor reductions, Impressions, Counseling / Coordination of care, Documenting in the medical record, Reviewing / ordering tests, medicine, procedures  , and Communicating with other healthcare professionals .    **Please Note: This note may have been constructed using a voice recognition system**

## 2025-03-24 ENCOUNTER — TELEPHONE (OUTPATIENT)
Dept: CARDIOLOGY CLINIC | Facility: CLINIC | Age: 60
End: 2025-03-24

## 2025-03-24 NOTE — TELEPHONE ENCOUNTER
F/U call to patient regarding his CardioMems readings, CHF symptoms, & weight, post hospital DC on 3/20.     Left a message & c/b number & request for patient to return call to nurse.  CardioMems readings:  3/24 - None  3/23 - 17  3/22 - None  3/21 - 13    Patient has hospital F/U OV tomorrow, 3/25.

## 2025-03-25 ENCOUNTER — TELEPHONE (OUTPATIENT)
Dept: CARDIOLOGY CLINIC | Facility: CLINIC | Age: 60
End: 2025-03-25

## 2025-03-25 NOTE — PROGRESS NOTES
Heart Failure Outpatient Progress Note - Mesfin Cano 59 y.o. male MRN: [id number]    @ Encounter: [id number]      Assessment/Plan:    Patient Active Problem List    Diagnosis Date Noted    Iron deficiency 10/07/2024    Onychomycosis 10/05/2024    Moderate persistent asthma without complication 06/18/2024    Type 2 diabetes mellitus without complication, without long-term current use of insulin (HCC) 12/23/2023    Anemia due to chronic kidney disease 10/16/2023    Chronic heart failure with preserved ejection fraction (HFpEF, >= 50%) (HCC) 04/10/2023    Diabetic polyneuropathy associated with type 2 diabetes mellitus (HCC) 12/20/2022    Acute renal failure superimposed on stage 3b chronic kidney disease (HCC) 09/16/2022    Presence of CardioMEMS HF system 03/09/2022    Paroxysmal atrial fibrillation (HCC) 03/01/2022    Foraminal stenosis of lumbar region 02/23/2021    VICKY (obstructive sleep apnea)     Hyperlipidemia 04/18/2019    Primary osteoarthritis of both knees 06/14/2018    Irritable bowel syndrome with diarrhea 01/30/2018    Primary hypertension 01/30/2018    Vitamin B12 deficiency 03/08/2016    Vitamin D deficiency 03/08/2016    Morbid obesity (HCC) 02/23/2016    CHF, acute on chronic (HCC) 03/12/2025    Liver lesion, left lobe 01/08/2025    Serum total bilirubin elevated 01/05/2025     Chronic diastolic congestive heart failure (HCC)      Wt Readings from Last 3 Encounters:   10/10/24 (!) 145 kg (319 lb)   10/03/24 (!) 150 kg (331 lb 11.2 oz)   10/01/24 (!) 147 kg (324 lb)     -not floridly volume overloaded but with weight gain since discharge, PAD up to 16  -will check BMP this AM. If K stable, will take dose of Metolazone 5 mg today  -follow up again in two weeks    Weight at discharge 309 lbs, today, 325    CardioMems in situ with PAD goal of 12 mmhg.   3/25-16  3/24 - None  3/23 - 17  3/22 - None  3/21 - 13  3/4 - 17  3/3 - 12  3/2 - 16  3/1 - None  2/28 - 12 2/27 - 15  2/26 - 14 2/25 - 12         RHC 3/9/22:   RA 4   RV 35/4   PA 35/12/21   PCWP 10   PA sat 64%   Travis CO 5.56 L/min   Travis CI 2.2L/min/m2   PVR 1.97 SAGASTUME      LHC 09/06/2022: no obstructive CAD.  TTE 04/11/2023: LVEF 50%. LVIDd 6.6 cm. Grade 2 DD. Normal RV. Trace MR and TR. Normal IVC.   RHC / MEMS calibration 10/11/2023: CardioMEMS data correlates to RHC.   TTE 06/20/2024: LVEF 55%. Normal RV. BRIAN (L>R). Normal IVC.      Guideline Directed Medical Therapy:  --Aldosterone Antagonist: spironolactone 25 mg daily.  --SGLT2 Inhibitor: empagliflozin 10 mg daily.      Volume Management:  --Home Diuretic: Bumex 6 mg TID, Metolazone T/Th/Sat  --K supplementation: potassium 40 mEq BID. 20 meq mid day    Primary hypertension  -controlled on regimen above    Hyperlipidemia        Lab Results   Component Value Date     LDLCALC 86 10/06/2023   Rx Atorvastatin 10 mg daily     Morbid obesity (HCC)  -BMI 42  -weight loss encouraged.    VICKY (obstructive sleep apnea)  -without CPAP and equipment for >12 months. In process of getting new equipment.    Paroxysmal atrial fibrillation (HCC)  [id number] = 3 (HF, HTN, DM).  Anticoagulation on Eliquis.   Rate control: metoprolol succinate 50 mg daily.  Rhythm control: No.    Stage 3a chronic kidney disease (HCC)        Lab Results   Component Value Date     EGFR 35 10/10/2024     EGFR 36 10/09/2024     EGFR 34 10/08/2024     CREATININE 2.04 (H) 10/10/2024     CREATININE 1.99 (H) 10/09/2024     CREATININE 2.09 (H) 10/08/2024     -Baseline creat around 2.4      Diabetic polyneuropathy associated with type 2 diabetes mellitus (HCC)        Lab Results   Component Value Date     HGBA1C 8.3 (H) 06/19/2024                Recent Labs     10/09/24  1200 10/09/24  1651 10/09/24  2139 10/10/24  0743   POCGLU 197* 178* 240* 178*         Blood Sugar Average: Last 72 hrs:  (P) [id number]     Moderate persistent asthma without complication  -management per primary team  Iron deficiency  -receiving IV Venofer infusions      H/O gastric bypass     HPI:  59-year-old male with history of HFpEF, atrial fibrillation/flutter, hypertension, hyperlipidemia, DM type II -- who presented to the hospital in early January with worsening SOB and weight gain of 7 pounds. Admitted for decompensated heart failure. Had been getting additional doses of metolazone as an outpatient due to fluid retention and elevated PAD readings on CardioMEMS. The readmitted on 3/11, again with volume overload. Diuresed with Bumex gtt and transitioned back to oral Bumex 6 mg TID, Spironolactone, Jardiance and Metolazone 5 mg three times weekly.    3/26/2025 presents today for hospital follow-up.  Reports gaining several pounds since discharge.  CardioMEMS reading from yesterday with PAD of 16.  States he feels completely fine and at his baseline.  He reports no dietary indiscretions.    Past Medical History:   Diagnosis Date    Abnormal stress test 06/26/2024    Acute gastritis without hemorrhage     last assessed 3/24/17    Acute on chronic diastolic heart failure (HCC) 01/04/2025    Asthma     Atrial fibrillation (HCC)     Bilateral leg edema 02/19/2020    CHF (congestive heart failure) (HCC)     Coronary artery disease     Daytime sleepiness 07/17/2019    Diabetes mellitus (HCC)     Dyspnea on exertion 10/11/2021    Edema of both feet 01/23/2020    Electrolyte abnormality 10/05/2024    Gastric bypass status for obesity     HNP (herniated nucleus pulposus), lumbar 02/23/2021    Hyperlipidemia     Hypertension     Hypokalemia 11/14/2021    Impaired fasting glucose     last assessed 5/10/17    Knee sprain, bilateral 06/14/2018    Leg cramps 06/16/2022    Obesity     Status post cholecystectomy 02/17/2024    Uncontrolled atrial flutter (HCC) 06/18/2024    Viral gastroenteritis     last assessed 11/4/16       12 point ROS negative other than that stated in HPI    Allergies   Allergen Reactions    Azithromycin Other (See Comments)     Shaky, uneasy feeling     Bactrim  [Sulfamethoxazole-Trimethoprim] Other (See Comments)     shakiness     .    Current Outpatient Medications:     Accu-Chek FastClix Lancets MISC, Test blood sugar twice daily, Disp: 200 each, Rfl: 3    acetaminophen (TYLENOL) 325 mg tablet, Take 2 tablets (650 mg total) by mouth every 6 (six) hours as needed for mild pain, headaches or fever, Disp: , Rfl:     acetaminophen (TYLENOL) 650 mg CR tablet, Take 1 tablet (650 mg total) by mouth every 8 (eight) hours as needed for mild pain, Disp: 30 tablet, Rfl: 0    albuterol (PROVENTIL HFA,VENTOLIN HFA) 90 mcg/act inhaler, Inhale 2 puffs every 4 (four) hours as needed for wheezing, Disp: 18 g, Rfl: 0    apixaban (Eliquis) 5 mg, Take 1 tablet (5 mg total) by mouth 2 (two) times a day, Disp: 180 tablet, Rfl: 0    atorvastatin (LIPITOR) 10 mg tablet, TAKE 1 TABLET BY MOUTH EVERY DAY, Disp: 30 tablet, Rfl: 5    Blood Glucose Monitoring Suppl (Accu-Chek Guide) w/Device KIT, Use 2 (two) times a day Test blood sugar twice daily, Disp: 1 kit, Rfl: 0    budesonide-formoterol (Symbicort) 160-4.5 mcg/act inhaler, Inhale 2 puffs 2 (two) times a day Rinse mouth after use., Disp: 30.6 g, Rfl: 2    bumetanide (BUMEX) 2 mg tablet, TAKE 3 TABLETS (6 MG TOTAL) BY MOUTH 3 (THREE) TIMES A DAY, Disp: 810 tablet, Rfl: 1    cyanocobalamin (VITAMIN B-12) 1000 MCG tablet, Take 1 tablet (1,000 mcg total) by mouth daily, Disp: 30 tablet, Rfl: 0    Empagliflozin (JARDIANCE) 10 MG TABS tablet, Take 1 tablet (10 mg total) by mouth daily, Disp: 30 tablet, Rfl: 11    famotidine (PEPCID) 20 mg tablet, Take 1 tablet (20 mg total) by mouth if needed for heartburn As needed twice a day, Disp: , Rfl:     fluticasone (FLONASE) 50 mcg/act nasal spray, 1 spray into each nostril 2 (two) times a day, Disp: , Rfl: 0    loratadine (CLARITIN) 10 mg tablet, Take 1 tablet (10 mg total) by mouth if needed for allergies As needed daily, Disp: , Rfl:     magnesium Oxide (MAG-OX) 400 mg TABS, Take 1 tablet (400 mg total)  by mouth 2 (two) times a day, Disp: 60 tablet, Rfl: 0    metolazone (ZAROXOLYN) 2.5 mg tablet, Take 2 tablets (5 mg total) by mouth 3 (three) times a week Take 20-30 minutes before Bumex dose and with additional potassium, Disp: 180 tablet, Rfl: 0    metoprolol succinate (TOPROL-XL) 50 mg 24 hr tablet, TAKE 1 TABLET BY MOUTH EVERY DAY, Disp: 90 tablet, Rfl: 1    montelukast (SINGULAIR) 10 mg tablet, Take 1 tablet (10 mg total) by mouth daily at bedtime, Disp: 90 tablet, Rfl: 1    nitroglycerin (NITROSTAT) 0.4 mg SL tablet, Place 1 tablet (0.4 mg total) under the tongue every 5 (five) minutes as needed for chest pain for up to 50 doses, Disp: 50 tablet, Rfl: 0    potassium chloride (Klor-Con M20) 20 mEq tablet, Take 2 tablets (40 mEq total) by mouth 3 (three) times a day with meals, Disp: , Rfl:     pregabalin (LYRICA) 50 mg capsule, Take 1 caps. at bedtime, Disp: 90 capsule, Rfl: 1    sitaGLIPtin (Januvia) 100 mg tablet, TAKE 1/2 TABLET BY MOUTH EVERY DAY, Disp: 15 tablet, Rfl: 5    sodium chloride (OCEAN) 0.65 % nasal spray, 1 spray into each nostril every hour as needed for congestion, Disp: 37.5 mL, Rfl: 0    spironolactone (ALDACTONE) 25 mg tablet, Take 1 tablet (25 mg total) by mouth daily, Disp: 30 tablet, Rfl: 0    tiotropium (Spiriva Respimat) 1.25 MCG/ACT AERS inhaler, Inhale 2 puffs daily, Disp: 4 g, Rfl: 5    Social History     Socioeconomic History    Marital status: /Civil Union     Spouse name: Not on file    Number of children: Not on file    Years of education: Not on file    Highest education level: Not on file   Occupational History    Not on file   Tobacco Use    Smoking status: Former     Current packs/day: 1.00     Average packs/day: 1 pack/day for 5.0 years (5.0 ttl pk-yrs)     Types: Pipe, Cigars, Cigarettes     Start date: 2016     Quit date: 1/1/2021     Passive exposure: Never    Smokeless tobacco: Former    Tobacco comments:     cigar once a day   Vaping Use    Vaping status: Never  "Used   Substance and Sexual Activity    Alcohol use: Yes     Alcohol/week: 2.0 standard drinks of alcohol     Types: 2 Shots of liquor per week     Comment: social    Drug use: No    Sexual activity: Yes     Partners: Female     Birth control/protection: None   Other Topics Concern    Not on file   Social History Narrative    Not on file     Social Drivers of Health     Financial Resource Strain: Not on file   Food Insecurity: No Food Insecurity (3/12/2025)    Nursing - Inadequate Food Risk Classification     Worried About Running Out of Food in the Last Year: Sometimes true     Ran Out of Food in the Last Year: Never true     Ran Out of Food in the Last Year: Never true   Transportation Needs: No Transportation Needs (3/12/2025)    Nursing - Transportation Risk Classification     Lack of Transportation: Not on file     Lack of Transportation: No   Physical Activity: Not on file   Stress: Not on file   Social Connections: Not on file   Intimate Partner Violence: Unknown (3/12/2025)    Nursing IPS     Feels Physically and Emotionally Safe: Not on file     Physically Hurt by Someone: Not on file     Humiliated or Emotionally Abused by Someone: Not on file     Physically Hurt by Someone: No     Hurt or Threatened by Someone: No   Housing Stability: Unknown (3/12/2025)    Nursing: Inadequate Housing Risk Classification     Has Housing: Not on file     Worried About Losing Housing: Not on file     Unable to Get Utilities: Not on file     Unable to Pay for Housing in the Last Year: No     Has Housin       Family History   Problem Relation Age of Onset    Stroke Mother     Hypertension Father     Cancer Father     COPD Father        Physical Exam:    Vitals: /72 (BP Location: Right arm, Patient Position: Sitting, Cuff Size: Large)   Pulse 92   Ht 6' 1\" (1.854 m)   Wt (!) 147 kg (325 lb)   SpO2 98%   BMI 42.88 kg/m²   ,   Wt Readings from Last 3 Encounters:   25 (!) 141 kg (309 lb 15.5 oz)   03/10/25 " (!) 151 kg (333 lb 3.2 oz)   01/31/25 (!) 143 kg (315 lb)       GEN: Mesfin Cano appears well, alert and oriented x 3, pleasant and cooperative   HEENT: pupils equal, round, and reactive to light; extraocular muscles intact  NECK: supple, no carotid bruits   HEART: regular rhythm, normal S1 and S2, no murmurs, clicks, gallops or rubs, JVP is  above clavicle.   LUNGS: clear to auscultation bilaterally; no wheezes, rales, or rhonchi   ABDOMEN: normal bowel sounds, soft, no tenderness, no distention  EXTREMITIES: peripheral pulses normal; no clubbing, cyanosis, trace BLLE edema  NEURO: no focal findings   SKIN: normal without suspicious lesions on exposed skin    Labs & Results:  Lab Results   Component Value Date    SODIUM 136 03/20/2025    K 3.4 (L) 03/20/2025    CL 91 (L) 03/20/2025    CO2 33 (H) 03/20/2025    AGAP 12 03/20/2025    BUN 60 (H) 03/20/2025    CREATININE 2.41 (H) 03/20/2025    GLUC 171 (H) 03/20/2025    GLUF 193 (H) 12/31/2024    CALCIUM 7.3 (L) 03/20/2025    AST 10 (L) 03/11/2025    ALT 6 (L) 03/11/2025    ALKPHOS 83 03/11/2025    TP 5.8 (L) 03/11/2025    TBILI 0.30 03/11/2025    EGFR 28 03/20/2025     Lab Results   Component Value Date    WBC 8.49 03/18/2025    HGB 11.7 (L) 03/18/2025    HCT 34.9 (L) 03/18/2025    MCV 93 03/18/2025     03/18/2025     Lab Results   Component Value Date     (H) 03/11/2025      Lab Results   Component Value Date    LDLCALC 86 10/06/2023     Lab Results   Component Value Date    VGE8XBBYFOQH 2.896 06/18/2024     Lab Results   Component Value Date    HGBA1C 6.3 03/10/2025         EKG personally reviewed by RODRIGUE Rodriges.   No results found for this visit on 03/26/25.     Counseling / Coordination of Care  Total face to face time spent with patient 15 minutes.  An additional 10 minutes was spent for chart/data review and visit preparation.       Thank you for the opportunity to participate in the care of this patient.    RODRIGUE Rodriges

## 2025-03-26 ENCOUNTER — APPOINTMENT (OUTPATIENT)
Dept: LAB | Facility: CLINIC | Age: 60
End: 2025-03-26
Payer: COMMERCIAL

## 2025-03-26 ENCOUNTER — OFFICE VISIT (OUTPATIENT)
Dept: CARDIOLOGY CLINIC | Facility: CLINIC | Age: 60
End: 2025-03-26
Payer: COMMERCIAL

## 2025-03-26 VITALS
DIASTOLIC BLOOD PRESSURE: 72 MMHG | HEART RATE: 92 BPM | HEIGHT: 73 IN | SYSTOLIC BLOOD PRESSURE: 120 MMHG | OXYGEN SATURATION: 98 % | WEIGHT: 315 LBS | BODY MASS INDEX: 41.75 KG/M2

## 2025-03-26 DIAGNOSIS — E61.1 IRON DEFICIENCY: ICD-10-CM

## 2025-03-26 DIAGNOSIS — N18.32 STAGE 3B CHRONIC KIDNEY DISEASE (HCC): ICD-10-CM

## 2025-03-26 DIAGNOSIS — I50.32 CHRONIC DIASTOLIC HEART FAILURE (HCC): Primary | ICD-10-CM

## 2025-03-26 DIAGNOSIS — I50.32 CHRONIC HEART FAILURE WITH PRESERVED EJECTION FRACTION (HCC): ICD-10-CM

## 2025-03-26 DIAGNOSIS — E11.65 UNCONTROLLED TYPE 2 DIABETES MELLITUS WITH HYPERGLYCEMIA (HCC): ICD-10-CM

## 2025-03-26 DIAGNOSIS — E78.2 MIXED HYPERLIPIDEMIA: Chronic | ICD-10-CM

## 2025-03-26 DIAGNOSIS — Z12.5 PROSTATE CANCER SCREENING: ICD-10-CM

## 2025-03-26 DIAGNOSIS — I50.9 ACUTE ON CHRONIC CONGESTIVE HEART FAILURE, UNSPECIFIED HEART FAILURE TYPE (HCC): ICD-10-CM

## 2025-03-26 LAB
ALBUMIN SERPL BCG-MCNC: 3.8 G/DL (ref 3.5–5)
ALP SERPL-CCNC: 100 U/L (ref 34–104)
ALT SERPL W P-5'-P-CCNC: 11 U/L (ref 7–52)
ANION GAP SERPL CALCULATED.3IONS-SCNC: 15 MMOL/L (ref 4–13)
AST SERPL W P-5'-P-CCNC: 15 U/L (ref 13–39)
BASOPHILS # BLD AUTO: 0.07 THOUSANDS/ÂΜL (ref 0–0.1)
BASOPHILS NFR BLD AUTO: 1 % (ref 0–1)
BILIRUB SERPL-MCNC: 0.44 MG/DL (ref 0.2–1)
BUN SERPL-MCNC: 19 MG/DL (ref 5–25)
CALCIUM SERPL-MCNC: 8.1 MG/DL (ref 8.4–10.2)
CHLORIDE SERPL-SCNC: 100 MMOL/L (ref 96–108)
CHOLEST SERPL-MCNC: 154 MG/DL (ref ?–200)
CO2 SERPL-SCNC: 23 MMOL/L (ref 21–32)
CREAT SERPL-MCNC: 1.48 MG/DL (ref 0.6–1.3)
EOSINOPHIL # BLD AUTO: 0.37 THOUSAND/ÂΜL (ref 0–0.61)
EOSINOPHIL NFR BLD AUTO: 6 % (ref 0–6)
ERYTHROCYTE [DISTWIDTH] IN BLOOD BY AUTOMATED COUNT: 15.1 % (ref 11.6–15.1)
FERRITIN SERPL-MCNC: 40 NG/ML (ref 24–336)
GFR SERPL CREATININE-BSD FRML MDRD: 51 ML/MIN/1.73SQ M
GLUCOSE P FAST SERPL-MCNC: 207 MG/DL (ref 65–99)
HCT VFR BLD AUTO: 37.8 % (ref 36.5–49.3)
HDLC SERPL-MCNC: 55 MG/DL
HGB BLD-MCNC: 12.2 G/DL (ref 12–17)
IMM GRANULOCYTES # BLD AUTO: 0.06 THOUSAND/UL (ref 0–0.2)
IMM GRANULOCYTES NFR BLD AUTO: 1 % (ref 0–2)
IRON SATN MFR SERPL: 17 % (ref 15–50)
IRON SERPL-MCNC: 69 UG/DL (ref 50–212)
LDLC SERPL CALC-MCNC: 69 MG/DL (ref 0–100)
LYMPHOCYTES # BLD AUTO: 0.85 THOUSANDS/ÂΜL (ref 0.6–4.47)
LYMPHOCYTES NFR BLD AUTO: 14 % (ref 14–44)
MCH RBC QN AUTO: 30.1 PG (ref 26.8–34.3)
MCHC RBC AUTO-ENTMCNC: 32.3 G/DL (ref 31.4–37.4)
MCV RBC AUTO: 93 FL (ref 82–98)
MONOCYTES # BLD AUTO: 0.37 THOUSAND/ÂΜL (ref 0.17–1.22)
MONOCYTES NFR BLD AUTO: 6 % (ref 4–12)
NEUTROPHILS # BLD AUTO: 4.25 THOUSANDS/ÂΜL (ref 1.85–7.62)
NEUTS SEG NFR BLD AUTO: 72 % (ref 43–75)
NRBC BLD AUTO-RTO: 0 /100 WBCS
PLATELET # BLD AUTO: 337 THOUSANDS/UL (ref 149–390)
PMV BLD AUTO: 10.6 FL (ref 8.9–12.7)
POTASSIUM SERPL-SCNC: 4.3 MMOL/L (ref 3.5–5.3)
PROT SERPL-MCNC: 6.9 G/DL (ref 6.4–8.4)
PSA SERPL-MCNC: 0.21 NG/ML (ref 0–4)
RBC # BLD AUTO: 4.05 MILLION/UL (ref 3.88–5.62)
SODIUM SERPL-SCNC: 138 MMOL/L (ref 135–147)
TIBC SERPL-MCNC: 410.2 UG/DL (ref 250–450)
TRANSFERRIN SERPL-MCNC: 293 MG/DL (ref 203–362)
TRIGL SERPL-MCNC: 149 MG/DL (ref ?–150)
UIBC SERPL-MCNC: 341 UG/DL (ref 155–355)
WBC # BLD AUTO: 5.97 THOUSAND/UL (ref 4.31–10.16)

## 2025-03-26 PROCEDURE — 80061 LIPID PANEL: CPT

## 2025-03-26 PROCEDURE — 83550 IRON BINDING TEST: CPT

## 2025-03-26 PROCEDURE — 99214 OFFICE O/P EST MOD 30 MIN: CPT | Performed by: NURSE PRACTITIONER

## 2025-03-26 PROCEDURE — 85025 COMPLETE CBC W/AUTO DIFF WBC: CPT

## 2025-03-26 PROCEDURE — G0103 PSA SCREENING: HCPCS

## 2025-03-26 PROCEDURE — 82728 ASSAY OF FERRITIN: CPT

## 2025-03-26 PROCEDURE — 83540 ASSAY OF IRON: CPT

## 2025-03-26 PROCEDURE — 80053 COMPREHEN METABOLIC PANEL: CPT

## 2025-03-26 PROCEDURE — 36415 COLL VENOUS BLD VENIPUNCTURE: CPT

## 2025-03-26 NOTE — PATIENT INSTRUCTIONS
Weigh yourself daily  If you gain 3 lbs in one day or 5 lbs in one week, please call the office at [phone] and ask for a nurse or the heart failure nurse  Keep your sodium intake to <2 grams, (2000 mg) per day, and fluids <2 Liters (2000 ml) per day. This is around 6-7, 8 oz glasses of fluid per day    Get lab work today  If stable, will have you take a dose of Metolazone 5 mg

## 2025-03-27 ENCOUNTER — RESULTS FOLLOW-UP (OUTPATIENT)
Dept: CARDIOLOGY CLINIC | Facility: CLINIC | Age: 60
End: 2025-03-27

## 2025-03-27 NOTE — TELEPHONE ENCOUNTER
----- Message from RODRIGUE David sent at 3/26/2025  8:26 PM EDT -----  Selina Toure is up weight again already. I told him to take Metolazone on Wednesday. Can you see if he did this? He may need to take this more than 3 times weekly if he continues on this path.Looks like his above PAD goal as well. Need to watch him like a hawk.     Capri  ----- Message -----  From: Lab, Background User  Sent: 3/26/2025   7:39 PM EDT  To: RODRIGUE Rodriges

## 2025-03-27 NOTE — TELEPHONE ENCOUNTER
Called pt, no answer, LMOM for pt to call back.  Pt can talk with any nurse to give his wt and how he is feeling today.  Ask what days he takes the metolazone.

## 2025-03-28 NOTE — TELEPHONE ENCOUNTER
F/U call to patient. Stated since OV with Capri on 3/26, patient has been taking Metolazone 5 mg daily, 30 minutes before morning Bumex 2 mg dosage. Stated he was instructed to take the Metolazone 5 mg daily until his next OV with Aster García PA-C on 4/9.  Today's Wt - 312 lb  3/26 OV Wt - 325 lb    CardioMems Readings:  3/28 - 13  3/27 - None  3/26 - 14  3/25 - 15  3/24 - 16  3/23 - 17    Patient stated he feels good. Swelling down in his feet & ankles & can get his shoes on. Stated baseline SOB.  Taking all cardiac medications as ordered, including Bumex & Potassium.    Advised I will f/u with patient on Tuesday or before.    Do you want patient to continue taking Metolazone 5 mg daily.  Please advise.

## 2025-03-31 NOTE — TELEPHONE ENCOUNTER
Patient returned call & stated he is feeling good. Stated he did stop taking Metolazone after our phone conversation on Friday, 3/28.   Did not take Metolazone Saturday, Sunday, or Monday.   Today's Wt - 311 lb  Today's CardioMems Reading - 17    Denied edema to lower extremities. Stated he can get his his shoes on without difficulty.  Denied abdominal bloating.  Stated yesterday morning & this morning he has experienced pain in his kidney area, radiating from right kidney area around his back to left kidney, both mornings. Stated he is waiting for a c/b from nephrology to schedule an appointment.  Next OV with Aster 4/9. Do you want patient to get blood work before then?    Please advise.

## 2025-03-31 NOTE — TELEPHONE ENCOUNTER
F/U call to patient. Patient identified himself on voice message. Left a detailed message reading RODRIGUE David message & instructions, with request for patient to return call to nurse/office with his current weights & symptoms.  Patient's latest CarioMems Readings:  3/31 - None  3/30 - 15  3/29 - 18

## 2025-04-01 NOTE — TELEPHONE ENCOUNTER
Returned call to patient, who stated he did receive my earlier phone message with Capri's orders & instructions.  Today's Wt - 311 lb  Today's CardioMems reading - 14  Denies any lower extremity edema or abdominal bloating.    Reviewed when to call office with CHF symptoms or weight gain. Continue taking daily CardioMems readings.

## 2025-04-01 NOTE — TELEPHONE ENCOUNTER
Returned call to patient who identified himself on voice message. Left a detailed  message, reading RODRIGUE David message & instructions.  Left c/b number & requested patient return call to nurse, with today's weight & CardioMems reading.

## 2025-04-03 ENCOUNTER — NURSE TRIAGE (OUTPATIENT)
Age: 60
End: 2025-04-03

## 2025-04-03 ENCOUNTER — TELEPHONE (OUTPATIENT)
Age: 60
End: 2025-04-03

## 2025-04-03 NOTE — TELEPHONE ENCOUNTER
"Called pt and relayed Dr. Vyas's message on voicemail.      \"Patient has recent blood test 8 days ago and kidney function improved back to baseline  Will order more blood test at upcoming visit.    Regarding flank pain, he should contact his pcp for further eval and recommendations. If pain is severe he should be seen in urgent care or ER.\"    Soonest available appt with Dr. Vyas is 4/17/25 in the [facility].   "

## 2025-04-03 NOTE — TELEPHONE ENCOUNTER
Kendra from  Nephrology calling in to let PCP know that pt has been calling their office regarding his flank pain and documentation was put in pt's chart and when they tried to call the pt back with Dr. Vyas's advice to schedule an appt with PCP, they could only leave a VM.     Please review message and notes from nephrology office and see if can reach pt to schedule an appt, TY.

## 2025-04-03 NOTE — TELEPHONE ENCOUNTER
Called patient's PCP office and spoke with Alethea is aware and stated that she will send a message to patient's PCP. No further questions at this time.

## 2025-04-03 NOTE — TELEPHONE ENCOUNTER
"FOLLOW UP: Patient to be seen in office in 3 days per protocol- no appointments available, please advise    REASON FOR CONVERSATION: Flank Pain    SYMPTOMS: Reports lower back pain with L and R flank pain at times. Started around a month ago. Denies painful urination, blood in urine, N/V, fever or constant pain. Reports last time he felt the pain was around 2-3 days ago and was mild.     OTHER: Patient has not been seen in office since 9/22. Per protocol Patient to be seen In office within 3 days- soonest available appointment was in Sept with provider.     DISPOSITION: See Within 3 Days in Office    Patient called in to Schedule follow up appointment with PEP, when speaking to PEP Patient mentioned having flank pain as reason for visit. Patient reports he started to experience mild lower back pain with L/R flank pain. Reports it comes and goes and last felt the pain 2-3 days ago- states it was mild but at its worst has been about 6/10. Denies any other symptoms, no blood in urine, n/v, fever, or urinary symptoms. Patient reports he takes Bumex TID and does tend to urinate a lot from this and is unsure if this is the cause also reports he has a hernia from a gallbladder sx. Patient would like to have lab work ordered. Patient has not been seen in office since 9/2022. Per protocol, patient to be seen in office within 3 days- unable to locate appointment in [facility] which is his preferred location until September. Care advice reviewed w/ patient advised when to seek emergent care. Please advise, patient requesting a call back. Thank you    Reason for Disposition   MILD pain (i.e., scale 1-3; does not interfere with normal activities) and present > 3 days    Answer Assessment - Initial Assessment Questions  1. LOCATION: \"Where does it hurt?\" (e.g., left, right)      L and R flank area  2. ONSET: \"When did the pain start?\"      Started in the last month. Reports he last felt the pain 3-4 days ago  3. SEVERITY: " "\"How bad is the pain?\" (e.g., Scale 1-10; mild, moderate, or severe)     Reports pain as mild when it does occur- states at its worst has been 6/10  4. PATTERN: \"Does the pain come and go, or is it constant?\"       Comes and goes  5. CAUSE: \"What do you think is causing the pain?\"      Reports he takes Bumex TID and urinates a lot. Also states he hes a hernia from recent gallbladder sx so he's unsure if this is cause of pain  6. OTHER SYMPTOMS:  \"Do you have any other symptoms?\" (e.g., fever, abdomen pain, vomiting, leg weakness, burning with urination, blood in urine)      Denies.    Protocols used: Flank Pain-Adult-[place]    "

## 2025-04-04 ENCOUNTER — TELEPHONE (OUTPATIENT)
Dept: CARDIOLOGY CLINIC | Facility: CLINIC | Age: 60
End: 2025-04-04

## 2025-04-04 DIAGNOSIS — I50.33 ACUTE ON CHRONIC DIASTOLIC CONGESTIVE HEART FAILURE (HCC): ICD-10-CM

## 2025-04-04 RX ORDER — SPIRONOLACTONE 25 MG/1
25 TABLET ORAL DAILY
Qty: 30 TABLET | Refills: 6 | Status: SHIPPED | OUTPATIENT
Start: 2025-04-04

## 2025-04-04 NOTE — TELEPHONE ENCOUNTER
2 week hospital d/c call to patient who said he is feeling good. Denied any edema to lower extremities or abdominal bloating.  Today's Wt - 311 lb  Read to patient & discussed elevated CardioMems Readings  4/4 - 15  4/3 - 16  4/2 - 14  4/1 - 14  3/31 - 17  3/30 - 15  3/29 - 18  Patient stated he does not feel he has extra fluid. Stated he is following 2000 mg sodium diet & < 2000 ml of fluid/day.  Patient stated he needs a refill on Spironolactone.

## 2025-04-07 ENCOUNTER — CLINICAL SUPPORT (OUTPATIENT)
Dept: CARDIOLOGY CLINIC | Facility: CLINIC | Age: 60
End: 2025-04-07
Payer: COMMERCIAL

## 2025-04-07 DIAGNOSIS — I50.32 CHRONIC DIASTOLIC HEART FAILURE (HCC): Primary | ICD-10-CM

## 2025-04-07 PROBLEM — I50.9 CHF, ACUTE ON CHRONIC (HCC): Status: RESOLVED | Noted: 2025-03-12 | Resolved: 2025-04-07

## 2025-04-07 PROCEDURE — 93264 REM MNTR WRLS P-ART PRS SNR: CPT | Performed by: INTERNAL MEDICINE

## 2025-04-07 NOTE — PROGRESS NOTES
Advanced Heart Failure Outpatient Visit    Mesfin Cano 59 y.o. male   MRN: [id number]  Encounter: [id number]    Assessment:  Patient Active Problem List    Diagnosis Date Noted    Iron deficiency 10/07/2024    Onychomycosis 10/05/2024    Moderate persistent asthma without complication 06/18/2024    Type 2 diabetes mellitus without complication, without long-term current use of insulin (HCC) 12/23/2023    Anemia due to chronic kidney disease 10/16/2023    Chronic heart failure with preserved ejection fraction (HFpEF, >= 50%) (HCC) 04/10/2023    Diabetic polyneuropathy associated with type 2 diabetes mellitus (HCC) 12/20/2022    Acute renal failure superimposed on stage 3b chronic kidney disease (HCC) 09/16/2022    Paroxysmal atrial fibrillation (HCC) 03/01/2022    Foraminal stenosis of lumbar region 02/23/2021    VICKY (obstructive sleep apnea)     Hyperlipidemia 04/18/2019    Primary osteoarthritis of both knees 06/14/2018    Irritable bowel syndrome with diarrhea 01/30/2018    Primary hypertension 01/30/2018    Vitamin B12 deficiency 03/08/2016    Vitamin D deficiency 03/08/2016    Morbid obesity (HCC) 02/23/2016    Liver lesion, left lobe 01/08/2025    Serum total bilirubin elevated 01/05/2025    Presence of CardioMEMS HF system 03/09/2022       Today's Plan:  Down 6 lbs since last office visit. Continue current medications  Provided with 2 weeks of Jardiance samples in office. Rx also sent to local pharmacy.   RTC in ~4 weeks.     Plan:  Chronic HFpEF; LVEF 55%              RHC / MEMS calibration 10/11/2023: CardioMEMS data correlates to RHC.    Weight of 325 lbs on 03/26. Today, weighs 319 lbs.    Most recent BMP from 03/26/2025: sodium 138; potassium 4.3; BUN 19; creatinine 1.48; eGFR 51.     Guideline Directed Medical Therapy:  --Aldosterone Antagonist: spironolactone 25 mg daily.   --SGLT2 Inhibitor: empagliflozin 10 mg daily.      Volume Management:  --Diuretic: PO Bumex 6 mg TID with metolazone 5 mg TTSa.  "  --K supplementation: potassium 40 mEq TID.                  Advanced Therapies:              --CardioMEMS: implanted 03/09/2022. Goal PAd of 12 mmHg.     Atrial fibrillation, parosxysmal              [id number] = 3 (HF, HTN, DM).              Anticoagulation on Eliquis.               Rate control: metoprolol succinate 50 mg daily.              Rhythm control: No.     Chronic kidney disease, stage IIIa  Hypertension  Hyperlipidemia  Asthma, severe persistent: follows with Dr. Noonan as outpatient.   Obstructive sleep apnea  Diabetes mellitus, type II  History of gastric bypass (Samuel-en-Y) surgery   History of tobacco abuse    HPI:   Mesfin Cano is a 59-year-old man with a PMH as above who presents to the office for follow-up. Follows with Dr. Wilcox.     03/26/2025 with TH: \"presented to the hospital in early January with worsening SOB and weight gain of 7 pounds. Admitted for decompensated heart failure. Had been getting additional doses of metolazone as an outpatient due to fluid retention and elevated PAD readings on CardioMEMS. The readmitted on 3/11, again with volume overload. Diuresed with Bumex gtt and transitioned back to oral Bumex 6 mg TID, Spironolactone, Jardiance and Metolazone 5 mg three times weekly.     presents today for hospital follow-up. Reports gaining several pounds since discharge. CardioMEMS reading from yesterday with PAD of 16. States he feels completely fine and at his baseline. He reports no dietary indiscretions.\"    Advised to take an additional metolazone x1 on 03/26. Patient misunderstood instructions, and began taking metolazone 5 mg daily. Patient was on daily metolazone dosing for ~2-3 days before this was remedied, and dosing changed back to 3x weekly.     04/09/2025: Patient presents for follow-up. Down 6 lbs since last office visit. Confirmed patient is now back on metolazone schedule of 5 mg 3x weekly. Denies lightheadedness, dizziness, chest pain, palpitations, SOB at rest, " TELLES, worsening LE swelling, PND, and orthopnea.    Past Medical History:   Diagnosis Date    Abnormal stress test 06/26/2024    Acute gastritis without hemorrhage     last assessed 3/24/17    Acute on chronic diastolic heart failure (HCC) 01/04/2025    Asthma     Atrial fibrillation (HCC)     Bilateral leg edema 02/19/2020    CHF (congestive heart failure) ([facility])     CHF, acute on chronic (HCC) 03/12/2025    Coronary artery disease     Daytime sleepiness 07/17/2019    Diabetes mellitus ([facility])     Dyspnea on exertion 10/11/2021    Edema of both feet 01/23/2020    Electrolyte abnormality 10/05/2024    Gastric bypass status for obesity     HNP (herniated nucleus pulposus), lumbar 02/23/2021    Hyperlipidemia     Hypertension     Hypokalemia 11/14/2021    Impaired fasting glucose     last assessed 5/10/17    Knee sprain, bilateral 06/14/2018    Leg cramps 06/16/2022    Obesity     Status post cholecystectomy 02/17/2024    Uncontrolled atrial flutter (HCC) 06/18/2024    Viral gastroenteritis     last assessed 11/4/16       Review of Systems   Constitutional:  Negative for activity change, appetite change, fatigue and unexpected weight change.   Respiratory:  Negative for cough, chest tightness, shortness of breath and wheezing.    Cardiovascular:  Negative for chest pain, palpitations and leg swelling.   Gastrointestinal:  Negative for abdominal distention.   Neurological:  Negative for dizziness, syncope, weakness and light-headedness.   Psychiatric/Behavioral:  Negative for sleep disturbance. The patient is not nervous/anxious.        Allergies   Allergen Reactions    Azithromycin Other (See Comments)     Shaky, uneasy feeling     Bactrim [Sulfamethoxazole-Trimethoprim] Other (See Comments)     shakiness         Current Outpatient Medications:     Accu-Chek FastClix Lancets MISC, Test blood sugar twice daily, Disp: 200 each, Rfl: 3    acetaminophen (TYLENOL) 325 mg tablet, Take 2 tablets (650 mg total) by mouth every 6  (six) hours as needed for mild pain, headaches or fever, Disp: , Rfl:     acetaminophen (TYLENOL) 650 mg CR tablet, Take 1 tablet (650 mg total) by mouth every 8 (eight) hours as needed for mild pain, Disp: 30 tablet, Rfl: 0    albuterol (PROVENTIL HFA,VENTOLIN HFA) 90 mcg/act inhaler, Inhale 2 puffs every 4 (four) hours as needed for wheezing, Disp: 18 g, Rfl: 0    apixaban (Eliquis) 5 mg, Take 1 tablet (5 mg total) by mouth 2 (two) times a day, Disp: 180 tablet, Rfl: 0    atorvastatin (LIPITOR) 10 mg tablet, TAKE 1 TABLET BY MOUTH EVERY DAY, Disp: 30 tablet, Rfl: 5    Blood Glucose Monitoring Suppl (Accu-Chek Guide) w/Device KIT, Use 2 (two) times a day Test blood sugar twice daily, Disp: 1 kit, Rfl: 0    budesonide-formoterol (Symbicort) 160-4.5 mcg/act inhaler, Inhale 2 puffs 2 (two) times a day Rinse mouth after use., Disp: 30.6 g, Rfl: 2    bumetanide (BUMEX) 2 mg tablet, TAKE 3 TABLETS (6 MG TOTAL) BY MOUTH 3 (THREE) TIMES A DAY, Disp: 810 tablet, Rfl: 1    cyanocobalamin (VITAMIN B-12) 1000 MCG tablet, Take 1 tablet (1,000 mcg total) by mouth daily, Disp: 30 tablet, Rfl: 0    Empagliflozin (JARDIANCE) 10 MG TABS tablet, Take 1 tablet (10 mg total) by mouth daily, Disp: 30 tablet, Rfl: 11    famotidine (PEPCID) 20 mg tablet, Take 1 tablet (20 mg total) by mouth if needed for heartburn As needed twice a day, Disp: , Rfl:     fluticasone (FLONASE) 50 mcg/act nasal spray, 1 spray into each nostril 2 (two) times a day, Disp: , Rfl: 0    loratadine (CLARITIN) 10 mg tablet, Take 1 tablet (10 mg total) by mouth if needed for allergies As needed daily, Disp: , Rfl:     magnesium Oxide (MAG-OX) 400 mg TABS, Take 1 tablet (400 mg total) by mouth 2 (two) times a day, Disp: 60 tablet, Rfl: 0    metolazone (ZAROXOLYN) 2.5 mg tablet, Take 2 tablets (5 mg total) by mouth 3 (three) times a week Take 20-30 minutes before Bumex dose and with additional potassium, Disp: 180 tablet, Rfl: 0    metoprolol succinate (TOPROL-XL) 50  mg 24 hr tablet, TAKE 1 TABLET BY MOUTH EVERY DAY, Disp: 90 tablet, Rfl: 1    montelukast (SINGULAIR) 10 mg tablet, Take 1 tablet (10 mg total) by mouth daily at bedtime, Disp: 90 tablet, Rfl: 1    nitroglycerin (NITROSTAT) 0.4 mg SL tablet, Place 1 tablet (0.4 mg total) under the tongue every 5 (five) minutes as needed for chest pain for up to 50 doses, Disp: 50 tablet, Rfl: 0    potassium chloride (Klor-Con M20) 20 mEq tablet, Take 2 tablets (40 mEq total) by mouth 3 (three) times a day with meals, Disp: , Rfl:     pregabalin (LYRICA) 50 mg capsule, Take 1 caps. at bedtime, Disp: 90 capsule, Rfl: 1    sitaGLIPtin (Januvia) 100 mg tablet, TAKE 1/2 TABLET BY MOUTH EVERY DAY, Disp: 15 tablet, Rfl: 5    sodium chloride (OCEAN) 0.65 % nasal spray, 1 spray into each nostril every hour as needed for congestion, Disp: 37.5 mL, Rfl: 0    spironolactone (ALDACTONE) 25 mg tablet, Take 1 tablet (25 mg total) by mouth daily, Disp: 30 tablet, Rfl: 6    tiotropium (Spiriva Respimat) 1.25 MCG/ACT AERS inhaler, Inhale 2 puffs daily, Disp: 4 g, Rfl: 5    Social History     Socioeconomic History    Marital status: /Civil Union     Spouse name: Not on file    Number of children: Not on file    Years of education: Not on file    Highest education level: Not on file   Occupational History    Not on file   Tobacco Use    Smoking status: Former     Current packs/day: 1.00     Average packs/day: 1 pack/day for 5.0 years (5.0 ttl pk-yrs)     Types: Pipe, Cigars, Cigarettes     Start date: 2016     Quit date: 1/1/2021     Passive exposure: Never    Smokeless tobacco: Former    Tobacco comments:     cigar once a day   Vaping Use    Vaping status: Never Used   Substance and Sexual Activity    Alcohol use: Yes     Alcohol/week: 2.0 standard drinks of alcohol     Types: 2 Shots of liquor per week     Comment: social    Drug use: No    Sexual activity: Yes     Partners: Female     Birth control/protection: None   Other Topics Concern     "Not on file   Social History Narrative    Not on file     Social Drivers of Health     Financial Resource Strain: Not on file   Food Insecurity: No Food Insecurity (3/12/2025)    Nursing - Inadequate Food Risk Classification     Worried About Running Out of Food in the Last Year: Sometimes true     Ran Out of Food in the Last Year: Never true     Ran Out of Food in the Last Year: Never true   Transportation Needs: No Transportation Needs (3/12/2025)    Nursing - Transportation Risk Classification     Lack of Transportation: Not on file     Lack of Transportation: No   Physical Activity: Not on file   Stress: Not on file   Social Connections: Not on file   Intimate Partner Violence: Unknown (3/12/2025)    Nursing IPS     Feels Physically and Emotionally Safe: Not on file     Physically Hurt by Someone: Not on file     Humiliated or Emotionally Abused by Someone: Not on file     Physically Hurt by Someone: No     Hurt or Threatened by Someone: No   Housing Stability: Unknown (3/12/2025)    Nursing: Inadequate Housing Risk Classification     Has Housing: Not on file     Worried About Losing Housing: Not on file     Unable to Get Utilities: Not on file     Unable to Pay for Housing in the Last Year: No     Has Housin     Family History   Problem Relation Age of Onset    Stroke Mother     Hypertension Father     Cancer Father     COPD Father        Vitals:   Blood pressure 122/74, pulse 92, height 6' 1\" (1.854 m), weight (!) 145 kg (319 lb 6.4 oz), SpO2 99%.    Wt Readings from Last 10 Encounters:   25 (!) 145 kg (319 lb 6.4 oz)   25 (!) 147 kg (325 lb)   25 (!) 141 kg (309 lb 15.5 oz)   03/10/25 (!) 151 kg (333 lb 3.2 oz)   25 (!) 143 kg (315 lb)   25 (!) 143 kg (315 lb 0.6 oz)   24 (!) 147 kg (323 lb 6.4 oz)   24 (!) 143 kg (314 lb 9.6 oz)   10/29/24 (!) 145 kg (319 lb)   10/10/24 (!) 145 kg (319 lb)     Vitals:    25 0813   BP: 122/74   BP Location: Right arm " "  Patient Position: Sitting   Cuff Size: Large   Pulse: 92   SpO2: 99%   Weight: (!) 145 kg (319 lb 6.4 oz)   Height: 6' 1\" (1.854 m)       Physical Exam  Vitals reviewed.   Constitutional:       General: He is awake. He is not in acute distress.     Appearance: Normal appearance. He is well-developed and overweight. He is not ill-appearing, toxic-appearing or diaphoretic.   HENT:      Head: Normocephalic.      Nose: Nose normal.   Cardiovascular:      Rate and Rhythm: Normal rate and regular rhythm. No extrasystoles are present.  Pulmonary:      Effort: Pulmonary effort is normal. No tachypnea, bradypnea, accessory muscle usage or respiratory distress.      Breath sounds: Normal air entry. No stridor. No rhonchi or rales.   Abdominal:      General: There is no distension.      Palpations: Abdomen is soft.   Musculoskeletal:      Cervical back: Neck supple.      Right lower leg: Edema (trace) present.      Left lower leg: Edema (trace) present.   Skin:     General: Skin is warm and dry.      Coloration: Skin is not jaundiced or pale.   Neurological:      Mental Status: He is alert and oriented to person, place, and time.   Psychiatric:         Attention and Perception: Attention normal.         Mood and Affect: Mood and affect normal.         Speech: Speech normal.         Behavior: Behavior normal. Behavior is cooperative.         Thought Content: Thought content normal.       Labs & Results:  Lab Results   Component Value Date    WBC 5.97 03/26/2025    HGB 12.2 03/26/2025    HCT 37.8 03/26/2025    MCV 93 03/26/2025     03/26/2025     Lab Results   Component Value Date    SODIUM 138 03/26/2025    K 4.3 03/26/2025     03/26/2025    CO2 23 03/26/2025    BUN 19 03/26/2025    CREATININE 1.48 (H) 03/26/2025    GLUC 171 (H) 03/20/2025    CALCIUM 8.1 (L) 03/26/2025     Lab Results   Component Value Date    INR 0.96 01/05/2025    INR 1.29 (H) 10/12/2023    PROTIME 13.1 01/05/2025    PROTIME 15.9 (H) 10/12/2023 "     Lab Results   Component Value Date     (H) 03/11/2025      Aster García PA-C

## 2025-04-09 ENCOUNTER — OFFICE VISIT (OUTPATIENT)
Dept: CARDIOLOGY CLINIC | Facility: CLINIC | Age: 60
End: 2025-04-09
Payer: COMMERCIAL

## 2025-04-09 VITALS
OXYGEN SATURATION: 99 % | SYSTOLIC BLOOD PRESSURE: 122 MMHG | WEIGHT: 315 LBS | BODY MASS INDEX: 41.75 KG/M2 | DIASTOLIC BLOOD PRESSURE: 74 MMHG | HEIGHT: 73 IN | HEART RATE: 92 BPM

## 2025-04-09 DIAGNOSIS — I50.32 CHRONIC HEART FAILURE WITH PRESERVED EJECTION FRACTION (HFPEF, >= 50%) (HCC): Primary | Chronic | ICD-10-CM

## 2025-04-09 DIAGNOSIS — E11.22 TYPE 2 DIABETES MELLITUS WITH STAGE 3B CHRONIC KIDNEY DISEASE, WITHOUT LONG-TERM CURRENT USE OF INSULIN (HCC): Chronic | ICD-10-CM

## 2025-04-09 DIAGNOSIS — I48.0 PAROXYSMAL ATRIAL FIBRILLATION (HCC): Chronic | ICD-10-CM

## 2025-04-09 DIAGNOSIS — N18.32 TYPE 2 DIABETES MELLITUS WITH STAGE 3B CHRONIC KIDNEY DISEASE, WITHOUT LONG-TERM CURRENT USE OF INSULIN (HCC): Chronic | ICD-10-CM

## 2025-04-09 PROCEDURE — 99214 OFFICE O/P EST MOD 30 MIN: CPT | Performed by: PHYSICIAN ASSISTANT

## 2025-04-09 NOTE — TELEPHONE ENCOUNTER
Left patient voice mail letting him know to call us and see how he is feeling if flank cordon is still there patient needs appointment

## 2025-04-09 NOTE — PATIENT INSTRUCTIONS
Please weigh yourself every day (after emptying your bladder) and keep a detailed log of weights.   Contact Cardiology at [phone] if you gain 3+ lbs overnight or 5+ lbs in 5-7 days.  Limit daily sodium/salt intake to 2000 mg daily to prevent fluid retention.  Avoid canned foods, fast food/Chinese food, and processed meats (hot dogs, lunch meat, and sausage etc.). Caution with condiments.  Limit fluid intake to 2000 mL or 2 liters (about 60-65 ounces) daily.  Avoid electrolyte replacement drinks (such as Gatorade, Pedialyte, Propel, Liquid IV, etc.).  Bring complete list of medications and log of daily weights to your follow-up appointment.

## 2025-04-11 ENCOUNTER — TELEPHONE (OUTPATIENT)
Dept: CARDIOLOGY CLINIC | Facility: CLINIC | Age: 60
End: 2025-04-11

## 2025-04-11 NOTE — TELEPHONE ENCOUNTER
Patient returned call to office. Stated he is feeling good. Denied swelling to lower extremities or abdominal tenderness or bloating. Stated he is experiencing his baseline SOB.  Today's Wt - 213 lb  Did a CardioMems reading today - 16     Taking all cardiac medications as ordered.

## 2025-04-11 NOTE — TELEPHONE ENCOUNTER
F/U call to patient. Left a message & c/b number & request for patient to return call to nurse.  Last CardioMems Reading - 13, on 4/9/25 4/8 CardioMems Reading - 14

## 2025-04-14 ENCOUNTER — OFFICE VISIT (OUTPATIENT)
Dept: FAMILY MEDICINE CLINIC | Facility: CLINIC | Age: 60
End: 2025-04-14
Payer: COMMERCIAL

## 2025-04-14 VITALS
HEIGHT: 73 IN | DIASTOLIC BLOOD PRESSURE: 78 MMHG | WEIGHT: 315 LBS | BODY MASS INDEX: 41.75 KG/M2 | TEMPERATURE: 98 F | OXYGEN SATURATION: 98 % | HEART RATE: 86 BPM | SYSTOLIC BLOOD PRESSURE: 118 MMHG | RESPIRATION RATE: 18 BRPM

## 2025-04-14 DIAGNOSIS — E11.9 TYPE 2 DIABETES MELLITUS WITHOUT COMPLICATION, WITHOUT LONG-TERM CURRENT USE OF INSULIN (HCC): ICD-10-CM

## 2025-04-14 DIAGNOSIS — K76.9 LIVER LESION, LEFT LOBE: ICD-10-CM

## 2025-04-14 DIAGNOSIS — K86.2 PANCREATIC CYST: Primary | ICD-10-CM

## 2025-04-14 DIAGNOSIS — Z12.11 COLON CANCER SCREENING: ICD-10-CM

## 2025-04-14 DIAGNOSIS — E66.01 MORBID OBESITY DUE TO EXCESS CALORIES (HCC): Chronic | ICD-10-CM

## 2025-04-14 PROCEDURE — 99213 OFFICE O/P EST LOW 20 MIN: CPT | Performed by: NURSE PRACTITIONER

## 2025-04-14 NOTE — ASSESSMENT & PLAN NOTE
CAT scan of the abdomen and pelvis performed in January 2025 shows a 0.6 cm lesion in the left lobe which may represent cyst.  Orders:    MRI abdomen w contrast; Future

## 2025-04-14 NOTE — ASSESSMENT & PLAN NOTE
MRI performed while patient hospitalized in February 2024 at the time of his cholecystectomy.  This did show scattered small cystic areas in the pancreas which may be pseudocyst versus IPMN.  Follow-up with MRI of the abdomen with contrast was recommended in 3 months.  This was not performed.  I will order this today.    IMPRESSION:     Gallstones.     5 mm low signal focus in the common duct is suspicious for a tiny common duct stone with perhaps a second small area of gravel. There is no common bile duct dilatation however.     No pancreatic duct dilatation. Scattered small cystic areas in the pancreas may represent small pseudocysts or side duct IPMNs. The pancreatic duct anatomy is not optimally seen and could be related to a prominent secondary duct in the pancreatic head.     Follow-up with contrast is suggested in 3 months time when the patient is no longer acute.    Orders:    MRI abdomen w contrast; Future

## 2025-04-14 NOTE — ASSESSMENT & PLAN NOTE
Patient continues to work on weight loss.  He has lost some weight since his hospitalization.  He is weighing himself daily.  Continued weight loss recommended.  BMI Counseling: Body mass index is 42.11 kg/m². The BMI is above normal. Nutrition recommendations include reducing portion sizes, decreasing overall calorie intake, 3-5 servings of fruits/vegetables daily, reducing fast food intake, consuming healthier snacks, decreasing soda and/or juice intake, and moderation in carbohydrate intake. Exercise recommendations include moderate aerobic physical activity for 150 minutes/week and exercising 3-5 times per week.

## 2025-04-14 NOTE — PROGRESS NOTES
Name: Mesfin Cano      : 1965      MRN: [id number]  Encounter Provider: RODRIGUE Hodge  Encounter Date: 2025   Encounter department: [facility]  :  Assessment & Plan  Colon cancer screening    Orders:    Ambulatory Referral to Colorectal Surgery; Future    Liver lesion, left lobe  CAT scan of the abdomen and pelvis performed in 2025 shows a 0.6 cm lesion in the left lobe which may represent cyst.  Orders:    MRI abdomen w contrast; Future    Pancreatic cyst  MRI performed while patient hospitalized in 2024 at the time of his cholecystectomy.  This did show scattered small cystic areas in the pancreas which may be pseudocyst versus IPMN.  Follow-up with MRI of the abdomen with contrast was recommended in 3 months.  This was not performed.  I will order this today.    IMPRESSION:     Gallstones.     5 mm low signal focus in the common duct is suspicious for a tiny common duct stone with perhaps a second small area of gravel. There is no common bile duct dilatation however.     No pancreatic duct dilatation. Scattered small cystic areas in the pancreas may represent small pseudocysts or side duct IPMNs. The pancreatic duct anatomy is not optimally seen and could be related to a prominent secondary duct in the pancreatic head.     Follow-up with contrast is suggested in 3 months time when the patient is no longer acute.    Orders:    MRI abdomen w contrast; Future    Type 2 diabetes mellitus without complication, without long-term current use of insulin (HCC)  Stable at present.  Lab Results   Component Value Date    HGBA1C 6.3 03/10/2025            Morbid obesity (HCC)  Patient continues to work on weight loss.  He has lost some weight since his hospitalization.  He is weighing himself daily.  Continued weight loss recommended.  BMI Counseling: Body mass index is 42.11 kg/m². The BMI is above normal. Nutrition recommendations include reducing portion sizes,  "decreasing overall calorie intake, 3-5 servings of fruits/vegetables daily, reducing fast food intake, consuming healthier snacks, decreasing soda and/or juice intake, and moderation in carbohydrate intake. Exercise recommendations include moderate aerobic physical activity for 150 minutes/week and exercising 3-5 times per week.               Depression Screening and Follow-up Plan: Patient was screened for depression during today's encounter. They screened negative with a PHQ-2 score of 0.        History of Present Illness   Patient presents to the office today with concern of right abdominal pain which radiates to his bilateral flank.  This is sporadic in nature.  There are no aggravating or alleviating factors.  He did have an MRI last February 2024 which showed pancreatic cysts concerning for IPMN.  3-month follow-up MRI was recommended.  This was not ordered or scheduled.  No nausea or vomiting.  Patient recently with some diarrhea.  No new medications.  He is overdue for diabetic eye exam and I did remind him to make this appointment.          Review of Systems   Constitutional: Negative.  Negative for fatigue.   HENT: Negative.  Negative for congestion, postnasal drip, rhinorrhea and trouble swallowing.    Eyes: Negative.  Negative for visual disturbance.   Respiratory: Negative.  Negative for choking and shortness of breath.    Cardiovascular: Negative.  Negative for chest pain.   Gastrointestinal:  Positive for abdominal pain and diarrhea.   Endocrine: Negative.    Genitourinary: Negative.    Musculoskeletal: Negative.  Negative for arthralgias, back pain, myalgias and neck pain.   Skin: Negative.    Neurological:  Negative for dizziness and headaches.   Psychiatric/Behavioral: Negative.         Objective   /78   Pulse 86   Temp 98 °F (36.7 °C) (Tympanic)   Resp 18   Ht 6' 1\" (1.854 m)   Wt (!) 145 kg (319 lb 3.2 oz)   SpO2 98%   BMI 42.11 kg/m²      Physical Exam  Vitals reviewed. "   Constitutional:       Appearance: Normal appearance. He is obese.   HENT:      Head: Normocephalic and atraumatic.      Nose: Nose normal.      Mouth/Throat:      Mouth: Mucous membranes are moist.   Eyes:      Extraocular Movements: Extraocular movements intact.      Pupils: Pupils are equal, round, and reactive to light.   Cardiovascular:      Rate and Rhythm: Normal rate and regular rhythm.      Pulses: Normal pulses.      Heart sounds: Normal heart sounds.   Pulmonary:      Effort: Pulmonary effort is normal.      Breath sounds: Normal breath sounds.   Abdominal:      General: Bowel sounds are normal. There is no distension.      Palpations: Abdomen is soft. There is no mass.      Tenderness: There is no abdominal tenderness. There is no guarding or rebound.      Hernia: No hernia is present.   Musculoskeletal:         General: Normal range of motion.   Skin:     General: Skin is warm.      Capillary Refill: Capillary refill takes less than 2 seconds.   Neurological:      General: No focal deficit present.      Mental Status: He is alert and oriented to person, place, and time. Mental status is at baseline.   Psychiatric:         Mood and Affect: Mood normal.         Behavior: Behavior normal.         Thought Content: Thought content normal.         Judgment: Judgment normal.

## 2025-04-17 NOTE — PLAN OF CARE
Problem: PAIN - ADULT  Goal: Verbalizes/displays adequate comfort level or baseline comfort level  Description: Interventions:  - Encourage patient to monitor pain and request assistance  - Assess pain using appropriate pain scale  - Administer analgesics based on type and severity of pain and evaluate response  - Implement non-pharmacological measures as appropriate and evaluate response  - Consider cultural and social influences on pain and pain management  - Notify physician/advanced practitioner if interventions unsuccessful or patient reports new pain  Outcome: Progressing     Problem: INFECTION - ADULT  Goal: Absence or prevention of progression during hospitalization  Description: INTERVENTIONS:  - Assess and monitor for signs and symptoms of infection  - Monitor lab/diagnostic results  - Monitor all insertion sites, i.e. indwelling lines, tubes, and drains  - Monitor endotracheal if appropriate and nasal secretions for changes in amount and color  - [place] appropriate cooling/warming therapies per order  - Administer medications as ordered  - Instruct and encourage patient and family to use good hand hygiene technique  - Identify and instruct in appropriate isolation precautions for identified infection/condition  Outcome: Progressing     Problem: SAFETY ADULT  Goal: Maintain or return to baseline ADL function  Description: INTERVENTIONS:  -  Assess patient's ability to carry out ADLs; assess patient's baseline for ADL function and identify physical deficits which impact ability to perform ADLs (bathing, care of mouth/teeth, toileting, grooming, dressing, etc.)  - Assess/evaluate cause of self-care deficits   - Assess range of motion  - Assess patient's mobility; develop plan if impaired  - Assess patient's need for assistive devices and provide as appropriate  - Encourage maximum independence but intervene and supervise when necessary  - Involve family in performance of ADLs  - Assess for home care   id# [id number]    Spoke to patient to schedule her EGD with Dr Escalona at [facility].  I offered 5/2 @ 9am with arrival time of 7:30am and she accepted.    I let the patient know they are required to bring someone with them that will be responsible to take them home along with their photo ID and insurance card.      If you are taking any weight lose injectable medication, you need to stop one week before procedure she said she does take medication I told her not to take the week before and can resume after the procedure not going to take it 4/30                            Trulicity (dulaglutide)                          Wegovy (Semaglutide)                          Ozempic (Semaglutide)                          Rybelsus (tirzepatide)                          Mounjaro (tirzepatide)                           Zepbound (tirzepatide)        I let them know once this is scheduled I will put the information in a letter along with where they need to go and pre-procedure instructions.   This letter will post to their my chart and go out in the mail.  She acknowledged thank you    needs following discharge   - Consider OT consult to assist with ADL evaluation and planning for discharge  - Provide patient education as appropriate  Outcome: Progressing

## 2025-04-18 ENCOUNTER — TELEPHONE (OUTPATIENT)
Dept: CARDIOLOGY CLINIC | Facility: CLINIC | Age: 60
End: 2025-04-18

## 2025-04-18 DIAGNOSIS — I48.91 A-FIB (HCC): ICD-10-CM

## 2025-04-18 DIAGNOSIS — I48.91 ATRIAL FIBRILLATION, UNSPECIFIED TYPE (HCC): ICD-10-CM

## 2025-04-18 NOTE — TELEPHONE ENCOUNTER
F/U call to patient who stated he is feeling really good. Denied any swelling to his legs/feet or abdomen. Stated he can put his shoes on without difficulty, no swelling.  Today's Wt - 313 lb  Reviewed elevated CardioMems Readings with patient:  4/17 - 17 4/16 - 21  4/15 - 22 4/14 - None  4/13 - 17 4/12 - 14  Patient stated he will check CardioMems reading today.  Reviewed with patient his prescribed cardiac medications. Patient stated he is taking them as ordered. Stated he does need a new prescription for Eliquis.    Stated he is following 200 mg sodium diet & fluid restrictions of 60-65 oz of fluid daily.

## 2025-04-19 ENCOUNTER — HOSPITAL ENCOUNTER (INPATIENT)
Facility: HOSPITAL | Age: 60
LOS: 8 days | Discharge: HOME | DRG: 291 | End: 2025-04-27
Attending: EMERGENCY MEDICINE | Admitting: INTERNAL MEDICINE
Payer: COMMERCIAL

## 2025-04-19 ENCOUNTER — NURSE TRIAGE (OUTPATIENT)
Dept: OTHER | Facility: OTHER | Age: 60
End: 2025-04-19

## 2025-04-19 ENCOUNTER — APPOINTMENT (EMERGENCY)
Dept: RADIOLOGY | Facility: HOSPITAL | Age: 60
DRG: 291 | End: 2025-04-19
Payer: COMMERCIAL

## 2025-04-19 DIAGNOSIS — I50.32 CHRONIC HEART FAILURE WITH PRESERVED EJECTION FRACTION (HCC): ICD-10-CM

## 2025-04-19 DIAGNOSIS — I50.9 CHF EXACERBATION (HCC): Primary | ICD-10-CM

## 2025-04-19 DIAGNOSIS — B35.1 ONYCHOMYCOSIS: ICD-10-CM

## 2025-04-19 LAB
ANION GAP SERPL CALCULATED.3IONS-SCNC: 13 MMOL/L (ref 4–13)
BASOPHILS # BLD AUTO: 0.07 THOUSANDS/ÂΜL (ref 0–0.1)
BASOPHILS NFR BLD AUTO: 1 % (ref 0–1)
BNP SERPL-MCNC: 214 PG/ML (ref 0–100)
BUN SERPL-MCNC: 24 MG/DL (ref 5–25)
CALCIUM SERPL-MCNC: 7.7 MG/DL (ref 8.4–10.2)
CHLORIDE SERPL-SCNC: 101 MMOL/L (ref 96–108)
CO2 SERPL-SCNC: 23 MMOL/L (ref 21–32)
CREAT SERPL-MCNC: 1.45 MG/DL (ref 0.6–1.3)
EOSINOPHIL # BLD AUTO: 0.47 THOUSAND/ÂΜL (ref 0–0.61)
EOSINOPHIL NFR BLD AUTO: 7 % (ref 0–6)
ERYTHROCYTE [DISTWIDTH] IN BLOOD BY AUTOMATED COUNT: 14.8 % (ref 11.6–15.1)
GFR SERPL CREATININE-BSD FRML MDRD: 53 ML/MIN/1.73SQ M
GLUCOSE SERPL-MCNC: 170 MG/DL (ref 65–140)
GLUCOSE SERPL-MCNC: 171 MG/DL (ref 65–140)
HCT VFR BLD AUTO: 37 % (ref 36.5–49.3)
HGB BLD-MCNC: 12 G/DL (ref 12–17)
IMM GRANULOCYTES # BLD AUTO: 0.07 THOUSAND/UL (ref 0–0.2)
IMM GRANULOCYTES NFR BLD AUTO: 1 % (ref 0–2)
LYMPHOCYTES # BLD AUTO: 1.24 THOUSANDS/ÂΜL (ref 0.6–4.47)
LYMPHOCYTES NFR BLD AUTO: 17 % (ref 14–44)
MCH RBC QN AUTO: 30.9 PG (ref 26.8–34.3)
MCHC RBC AUTO-ENTMCNC: 32.4 G/DL (ref 31.4–37.4)
MCV RBC AUTO: 95 FL (ref 82–98)
MONOCYTES # BLD AUTO: 0.58 THOUSAND/ÂΜL (ref 0.17–1.22)
MONOCYTES NFR BLD AUTO: 8 % (ref 4–12)
NEUTROPHILS # BLD AUTO: 4.85 THOUSANDS/ÂΜL (ref 1.85–7.62)
NEUTS SEG NFR BLD AUTO: 66 % (ref 43–75)
NRBC BLD AUTO-RTO: 0 /100 WBCS
PLATELET # BLD AUTO: 313 THOUSANDS/UL (ref 149–390)
PMV BLD AUTO: 9.8 FL (ref 8.9–12.7)
POTASSIUM SERPL-SCNC: 3.8 MMOL/L (ref 3.5–5.3)
RBC # BLD AUTO: 3.88 MILLION/UL (ref 3.88–5.62)
SODIUM SERPL-SCNC: 137 MMOL/L (ref 135–147)
WBC # BLD AUTO: 7.28 THOUSAND/UL (ref 4.31–10.16)

## 2025-04-19 PROCEDURE — 82948 REAGENT STRIP/BLOOD GLUCOSE: CPT

## 2025-04-19 PROCEDURE — 99223 1ST HOSP IP/OBS HIGH 75: CPT | Performed by: INTERNAL MEDICINE

## 2025-04-19 PROCEDURE — 85025 COMPLETE CBC W/AUTO DIFF WBC: CPT

## 2025-04-19 PROCEDURE — 99285 EMERGENCY DEPT VISIT HI MDM: CPT | Performed by: EMERGENCY MEDICINE

## 2025-04-19 PROCEDURE — 80048 BASIC METABOLIC PNL TOTAL CA: CPT

## 2025-04-19 PROCEDURE — 93005 ELECTROCARDIOGRAM TRACING: CPT

## 2025-04-19 PROCEDURE — 96374 THER/PROPH/DIAG INJ IV PUSH: CPT

## 2025-04-19 PROCEDURE — 36415 COLL VENOUS BLD VENIPUNCTURE: CPT

## 2025-04-19 PROCEDURE — 71045 X-RAY EXAM CHEST 1 VIEW: CPT

## 2025-04-19 PROCEDURE — 83880 ASSAY OF NATRIURETIC PEPTIDE: CPT

## 2025-04-19 PROCEDURE — 99285 EMERGENCY DEPT VISIT HI MDM: CPT

## 2025-04-19 RX ORDER — BUDESONIDE AND FORMOTEROL FUMARATE DIHYDRATE 160; 4.5 UG/1; UG/1
2 AEROSOL RESPIRATORY (INHALATION) 2 TIMES DAILY
Status: DISCONTINUED | OUTPATIENT
Start: 2025-04-19 | End: 2025-04-27 | Stop reason: HOSPADM

## 2025-04-19 RX ORDER — LANOLIN ALCOHOL/MO/W.PET/CERES
400 CREAM (GRAM) TOPICAL 2 TIMES DAILY
Status: DISCONTINUED | OUTPATIENT
Start: 2025-04-19 | End: 2025-04-27 | Stop reason: HOSPADM

## 2025-04-19 RX ORDER — BUMETANIDE 0.25 MG/ML
2 INJECTION, SOLUTION INTRAMUSCULAR; INTRAVENOUS ONCE
Status: COMPLETED | OUTPATIENT
Start: 2025-04-19 | End: 2025-04-19

## 2025-04-19 RX ORDER — ACETAMINOPHEN 325 MG/1
650 TABLET ORAL EVERY 6 HOURS PRN
Status: DISCONTINUED | OUTPATIENT
Start: 2025-04-19 | End: 2025-04-27 | Stop reason: HOSPADM

## 2025-04-19 RX ORDER — ATORVASTATIN CALCIUM 10 MG/1
10 TABLET, FILM COATED ORAL DAILY
Status: DISCONTINUED | OUTPATIENT
Start: 2025-04-20 | End: 2025-04-27 | Stop reason: HOSPADM

## 2025-04-19 RX ORDER — SPIRONOLACTONE 25 MG/1
25 TABLET ORAL DAILY
Status: DISCONTINUED | OUTPATIENT
Start: 2025-04-20 | End: 2025-04-24

## 2025-04-19 RX ORDER — ONDANSETRON 2 MG/ML
4 INJECTION INTRAMUSCULAR; INTRAVENOUS EVERY 6 HOURS PRN
Status: DISCONTINUED | OUTPATIENT
Start: 2025-04-19 | End: 2025-04-27 | Stop reason: HOSPADM

## 2025-04-19 RX ORDER — INSULIN LISPRO 100 [IU]/ML
1-6 INJECTION, SOLUTION INTRAVENOUS; SUBCUTANEOUS
Status: DISCONTINUED | OUTPATIENT
Start: 2025-04-19 | End: 2025-04-27 | Stop reason: HOSPADM

## 2025-04-19 RX ORDER — BUMETANIDE 0.25 MG/ML
1 INJECTION INTRAMUSCULAR; INTRAVENOUS CONTINUOUS
Status: DISCONTINUED | OUTPATIENT
Start: 2025-04-19 | End: 2025-04-22

## 2025-04-19 RX ORDER — PREGABALIN 50 MG/1
50 CAPSULE ORAL
Status: DISCONTINUED | OUTPATIENT
Start: 2025-04-19 | End: 2025-04-27 | Stop reason: HOSPADM

## 2025-04-19 RX ORDER — METOPROLOL SUCCINATE 50 MG/1
50 TABLET, EXTENDED RELEASE ORAL DAILY
Status: DISCONTINUED | OUTPATIENT
Start: 2025-04-20 | End: 2025-04-27 | Stop reason: HOSPADM

## 2025-04-19 RX ORDER — ALBUTEROL SULFATE 90 UG/1
2 INHALANT RESPIRATORY (INHALATION) EVERY 4 HOURS PRN
Status: DISCONTINUED | OUTPATIENT
Start: 2025-04-19 | End: 2025-04-27 | Stop reason: HOSPADM

## 2025-04-19 RX ORDER — INSULIN LISPRO 100 [IU]/ML
2-12 INJECTION, SOLUTION INTRAVENOUS; SUBCUTANEOUS
Status: DISCONTINUED | OUTPATIENT
Start: 2025-04-20 | End: 2025-04-27 | Stop reason: HOSPADM

## 2025-04-19 RX ADMIN — Medication 1 MG/HR: at 21:33

## 2025-04-19 RX ADMIN — INSULIN LISPRO 1 UNITS: 100 INJECTION, SOLUTION INTRAVENOUS; SUBCUTANEOUS at 21:32

## 2025-04-19 RX ADMIN — BUMETANIDE 2 MG: 0.25 INJECTION INTRAMUSCULAR; INTRAVENOUS at 19:33

## 2025-04-19 RX ADMIN — APIXABAN 5 MG: 5 TABLET, FILM COATED ORAL at 20:29

## 2025-04-19 RX ADMIN — PREGABALIN 50 MG: 50 CAPSULE ORAL at 21:32

## 2025-04-19 RX ADMIN — MAGNESIUM OXIDE TAB 400 MG (241.3 MG ELEMENTAL MG) 400 MG: 400 (241.3 MG) TAB at 20:29

## 2025-04-19 NOTE — TELEPHONE ENCOUNTER
"FOLLOW UP: none    REASON FOR CONVERSATION: Leg Swelling    SYMPTOMS: shortness of breath, weight gain, lower extremity swelling    OTHER: n/a    DISPOSITION: Go to ED Now (or PCP Triage). Per on call provider, he agrees with ED disposition due to patients SOB. Informed patient and he verbalized understanding. States he will call an ambulance, he is home alone.       Reason for Disposition   [1] Difficulty breathing with exertion (e.g., walking) AND [2] new-onset or getting worse    Answer Assessment - Initial Assessment Questions  1. ONSET: \"When did the swelling start?\" (e.g., minutes, hours, days)        4/19    2. LOCATION: \"What part of the leg is swollen?\"  \"Are both legs swollen or just one leg?\"        Both legs    3. SEVERITY: \"How bad is the swelling?\" (e.g., localized; mild, moderate, severe)        Moderate- swelling of the legs and feet    4. REDNESS: \"Does the swelling look red or infected?\"        No    5. PAIN: \"Is the swelling painful to touch?\" If Yes, ask: \"How painful is it?\"   (Scale 1-10; mild, moderate or severe)        No    6. FEVER: \"Do you have a fever?\" If Yes, ask: \"What is it, how was it measured, and when did it start?\"         No    7. CAUSE: \"What do you think is causing the leg swelling?\"        CHF    8. MEDICAL HISTORY: \"Do you have a history of blood clots (e.g., DVT), cancer, heart failure, kidney disease, or liver failure?\"        CHF    9. RECURRENT SYMPTOM: \"Have you had leg swelling before?\" If Yes, ask: \"When was the last time?\" \"What happened that time?\"        Yes     10. OTHER SYMPTOMS: \"Do you have any other symptoms?\" (e.g., chest pain, difficulty breathing)          Shortness of breath. Current weight 327lbs (yesterday was 113), lower back pain, abdominal distension    Protocols used: Leg Swelling and Edema-Adult-AH    "

## 2025-04-20 LAB
ANION GAP SERPL CALCULATED.3IONS-SCNC: 11 MMOL/L (ref 4–13)
BUN SERPL-MCNC: 25 MG/DL (ref 5–25)
CALCIUM SERPL-MCNC: 7.9 MG/DL (ref 8.4–10.2)
CHLORIDE SERPL-SCNC: 96 MMOL/L (ref 96–108)
CO2 SERPL-SCNC: 29 MMOL/L (ref 21–32)
CREAT SERPL-MCNC: 1.42 MG/DL (ref 0.6–1.3)
ERYTHROCYTE [DISTWIDTH] IN BLOOD BY AUTOMATED COUNT: 14.8 % (ref 11.6–15.1)
GFR SERPL CREATININE-BSD FRML MDRD: 54 ML/MIN/1.73SQ M
GLUCOSE SERPL-MCNC: 131 MG/DL (ref 65–140)
GLUCOSE SERPL-MCNC: 144 MG/DL (ref 65–140)
GLUCOSE SERPL-MCNC: 153 MG/DL (ref 65–140)
GLUCOSE SERPL-MCNC: 160 MG/DL (ref 65–140)
GLUCOSE SERPL-MCNC: 198 MG/DL (ref 65–140)
HCT VFR BLD AUTO: 36 % (ref 36.5–49.3)
HGB BLD-MCNC: 11.8 G/DL (ref 12–17)
MAGNESIUM SERPL-MCNC: 1.8 MG/DL (ref 1.9–2.7)
MCH RBC QN AUTO: 31 PG (ref 26.8–34.3)
MCHC RBC AUTO-ENTMCNC: 32.8 G/DL (ref 31.4–37.4)
MCV RBC AUTO: 95 FL (ref 82–98)
PLATELET # BLD AUTO: 283 THOUSANDS/UL (ref 149–390)
PMV BLD AUTO: 10.3 FL (ref 8.9–12.7)
POTASSIUM SERPL-SCNC: 3.9 MMOL/L (ref 3.5–5.3)
RBC # BLD AUTO: 3.81 MILLION/UL (ref 3.88–5.62)
SODIUM SERPL-SCNC: 136 MMOL/L (ref 135–147)
WBC # BLD AUTO: 6.47 THOUSAND/UL (ref 4.31–10.16)

## 2025-04-20 PROCEDURE — 85027 COMPLETE CBC AUTOMATED: CPT | Performed by: INTERNAL MEDICINE

## 2025-04-20 PROCEDURE — 83735 ASSAY OF MAGNESIUM: CPT | Performed by: INTERNAL MEDICINE

## 2025-04-20 PROCEDURE — 99223 1ST HOSP IP/OBS HIGH 75: CPT | Performed by: PHYSICIAN ASSISTANT

## 2025-04-20 PROCEDURE — 82948 REAGENT STRIP/BLOOD GLUCOSE: CPT

## 2025-04-20 PROCEDURE — 99232 SBSQ HOSP IP/OBS MODERATE 35: CPT | Performed by: FAMILY MEDICINE

## 2025-04-20 PROCEDURE — 80048 BASIC METABOLIC PNL TOTAL CA: CPT | Performed by: INTERNAL MEDICINE

## 2025-04-20 RX ORDER — POTASSIUM CHLORIDE 1500 MG/1
40 TABLET, EXTENDED RELEASE ORAL ONCE
Status: COMPLETED | OUTPATIENT
Start: 2025-04-20 | End: 2025-04-20

## 2025-04-20 RX ORDER — POTASSIUM CHLORIDE 1500 MG/1
40 TABLET, EXTENDED RELEASE ORAL
Status: DISCONTINUED | OUTPATIENT
Start: 2025-04-20 | End: 2025-04-27 | Stop reason: HOSPADM

## 2025-04-20 RX ORDER — POTASSIUM CHLORIDE 1500 MG/1
40 TABLET, EXTENDED RELEASE ORAL 2 TIMES DAILY
Status: DISCONTINUED | OUTPATIENT
Start: 2025-04-20 | End: 2025-04-20

## 2025-04-20 RX ORDER — MAGNESIUM SULFATE HEPTAHYDRATE 40 MG/ML
2 INJECTION, SOLUTION INTRAVENOUS ONCE
Status: COMPLETED | OUTPATIENT
Start: 2025-04-20 | End: 2025-04-20

## 2025-04-20 RX ADMIN — SPIRONOLACTONE 25 MG: 25 TABLET, FILM COATED ORAL at 08:35

## 2025-04-20 RX ADMIN — Medication 1 MG/HR: at 08:46

## 2025-04-20 RX ADMIN — INSULIN LISPRO 2 UNITS: 100 INJECTION, SOLUTION INTRAVENOUS; SUBCUTANEOUS at 06:24

## 2025-04-20 RX ADMIN — POTASSIUM CHLORIDE 40 MEQ: 1500 TABLET, EXTENDED RELEASE ORAL at 16:50

## 2025-04-20 RX ADMIN — BUDESONIDE AND FORMOTEROL FUMARATE DIHYDRATE 2 PUFF: 160; 4.5 AEROSOL RESPIRATORY (INHALATION) at 16:51

## 2025-04-20 RX ADMIN — INSULIN LISPRO 2 UNITS: 100 INJECTION, SOLUTION INTRAVENOUS; SUBCUTANEOUS at 20:52

## 2025-04-20 RX ADMIN — PREGABALIN 50 MG: 50 CAPSULE ORAL at 20:53

## 2025-04-20 RX ADMIN — ATORVASTATIN CALCIUM 10 MG: 10 TABLET, FILM COATED ORAL at 08:35

## 2025-04-20 RX ADMIN — EMPAGLIFLOZIN 10 MG: 10 TABLET, FILM COATED ORAL at 08:36

## 2025-04-20 RX ADMIN — POTASSIUM CHLORIDE 40 MEQ: 1500 TABLET, EXTENDED RELEASE ORAL at 09:26

## 2025-04-20 RX ADMIN — APIXABAN 5 MG: 5 TABLET, FILM COATED ORAL at 08:35

## 2025-04-20 RX ADMIN — MAGNESIUM SULFATE HEPTAHYDRATE 2 G: 40 INJECTION, SOLUTION INTRAVENOUS at 09:31

## 2025-04-20 RX ADMIN — POTASSIUM CHLORIDE 40 MEQ: 1500 TABLET, EXTENDED RELEASE ORAL at 12:00

## 2025-04-20 RX ADMIN — BUDESONIDE AND FORMOTEROL FUMARATE DIHYDRATE 2 PUFF: 160; 4.5 AEROSOL RESPIRATORY (INHALATION) at 08:37

## 2025-04-20 RX ADMIN — CYANOCOBALAMIN TAB 500 MCG 1000 MCG: 500 TAB at 08:35

## 2025-04-20 RX ADMIN — Medication 1 MG/HR: at 14:46

## 2025-04-20 RX ADMIN — UMECLIDINIUM 1 PUFF: 62.5 AEROSOL, POWDER ORAL at 08:37

## 2025-04-20 RX ADMIN — APIXABAN 5 MG: 5 TABLET, FILM COATED ORAL at 16:50

## 2025-04-20 RX ADMIN — INSULIN LISPRO 2 UNITS: 100 INJECTION, SOLUTION INTRAVENOUS; SUBCUTANEOUS at 11:59

## 2025-04-20 RX ADMIN — Medication 1 MG/HR: at 07:38

## 2025-04-20 RX ADMIN — METOPROLOL SUCCINATE 50 MG: 50 TABLET, EXTENDED RELEASE ORAL at 08:35

## 2025-04-20 RX ADMIN — MAGNESIUM OXIDE TAB 400 MG (241.3 MG ELEMENTAL MG) 400 MG: 400 (241.3 MG) TAB at 16:50

## 2025-04-20 RX ADMIN — MAGNESIUM OXIDE TAB 400 MG (241.3 MG ELEMENTAL MG) 400 MG: 400 (241.3 MG) TAB at 08:35

## 2025-04-20 NOTE — ASSESSMENT & PLAN NOTE
Monitor on home metoprolol and with diuresis   Positive UA on admission, with patient now reporting dysuria -  - c/w Ceftriaxone 1g qd, first dose given 12/5AM

## 2025-04-20 NOTE — ASSESSMENT & PLAN NOTE
Patient well-known to the facility with recurrent admissions for CHF  History of preserved EF  Last hospitalized 3/11 3/20  Presents with 15 pound weight gain over the last 48 hours, increasing dyspnea on exertion, and lower extremity edema  patient reports compliance with home meds, fluid restriction, and sodium restriction.      Will place on Bumex drip 1 mg/h  Monitor on telemetry  Monitor intake/output, and daily weights  Monitor electrolytes closely with diuresis; Daily BMP  GDMT: Continue home Jardiance, spironolactone, metoprolol  Heart failure consultation

## 2025-04-20 NOTE — CASE MANAGEMENT
Case Management Assessment & Discharge Planning Note    Patient name Mesfin Cano  [facility] MRN [id number]  : 1965 Date 2025       Current Admission Date: 2025  Current Admission Diagnosis:Acute on chronic diastolic CHF (congestive heart failure) (HCC)   Patient Active Problem List    Diagnosis Date Noted Date Diagnosed    Pancreatic cyst 2025     Liver lesion, left lobe 2025     Serum total bilirubin elevated 2025     Acute on chronic diastolic CHF (congestive heart failure) (HCC) 2025     Iron deficiency 10/07/2024     Onychomycosis 10/05/2024     Moderate persistent asthma without complication 2024     Type 2 diabetes mellitus without complication, without long-term current use of insulin (HCC) 2023     Anemia due to chronic kidney disease 10/16/2023     Chronic heart failure with preserved ejection fraction (HFpEF, >= 50%) (HCC) 04/10/2023     Diabetic polyneuropathy associated with type 2 diabetes mellitus (HCC) 2022     Stage 3 chronic kidney disease (HCC) 2022     Presence of CardioMEMS HF system 2022     Paroxysmal atrial fibrillation (HCC) 2022     Foraminal stenosis of lumbar region 2021     VICKY (obstructive sleep apnea)      Hyperlipidemia 2019     Primary osteoarthritis of both knees 2018     Irritable bowel syndrome with diarrhea 2018     Primary hypertension 2018     Vitamin B12 deficiency 2016     Vitamin D deficiency 2016     Morbid obesity (HCC) 2016       LOS (days): 1  Geometric Mean LOS (GMLOS) (days):   Days to GMLOS:     OBJECTIVE:  PATIENT READMITTED TO HOSPITAL  Risk of Unplanned Readmission Score: 36.56         Current admission status: Inpatient       Preferred Pharmacy:   [id number] - [place], [place]  [address]  [place] [address]  Phone: [phone] Fax: [phone]    Primary Care Provider: Beatriz GRAY  [facility] - On[facility] ([facility])  Otorhinolaryngology-Head & Neck Surgery  Consult Note    Patient Name: Awilda Herndon  MRN: [id number]  Code Status: Full Code  Admission Date: 9/2/2022  Hospital Length of Stay: 5 days  Attending Physician: Doug Gibson MD  Primary Care Provider: Primary Doctor No    Patient information was obtained from patient, relative(s), ER records and primary team.     Inpatient consult to ENT  Consult performed by: John Rajput MD  Consult ordered by: Kristal Powers DO        Subjective:     Chief Complaint/Reason for Admission: dysphagia    History of Present Illness: 66F known previously to ENT service, seen by Dr. Daley most recently 1 year ago for bilat TVF parakeratosis. Has had DL w biopsies - benign - previously. Admitted to hospital for upper extrem weakness, s/p posterior C-spine fusion w NSGY 2 days ago. Onset of dysphagia following surgery, failed swallow w SLP. ENT consulted for further eval.    Pt denies difficulty breathing. Daughter reports sounds like there was stertor after surgery but this has resolved. Swallowing liquids is worse than solid. No changes in her voice. Remote hx of smoking. Hx of metastatic breast ca and lung ca.       Medications:  Continuous Infusions:   lactated ringers       Scheduled Meds:   dexamethasone  4 mg Intravenous Q6H    famotidine  20 mg Oral BID    folic acid  1,000 mcg Oral Daily    levetiracetam IV  500 mg Intravenous Q12H    mupirocin   Nasal BID    nortriptyline  25 mg Oral QHS    olanzapine zydis  5 mg Oral QHS    polyethylene glycol  17 g Oral BID    senna-docusate 8.6-50 mg  2 tablet Oral QHS     PRN Meds:aluminum-magnesium hydroxide-simethicone, dextrose 10%, dextrose 10%, glucagon (human recombinant), glucose, glucose, HYDROmorphone, insulin aspart U-100, methocarbamoL, naloxone, oxyCODONE, oxyCODONE, prochlorperazine, promethazine (PHENERGAN) IVPB, sodium chloride 0.9%     No current facility-administered medications on  "file prior to encounter.     Current Outpatient Medications on File Prior to Encounter   Medication Sig    acetaminophen (TYLENOL) 500 MG tablet Take 500 mg by mouth every 6 (six) hours as needed for Pain.    alcohol swabs PadM Apply 3 each topically once daily.    apixaban (ELIQUIS) 5 mg Tab Take 1 tablet (5 mg total) by mouth 2 (two) times daily.    BD ULTRA-FINE MINI PEN NEEDLE 31 gauge x 3/16" Ndle 1 each 4 (four) times daily.     blood glucose control high,low (ACCU-CHEK FILI CONTROL SOLN) Soln 1 each by Misc.(Non-Drug; Combo Route) route once daily.    blood glucose control, low (TRUE METRIX LEVEL 1) Soln As indicated    blood sugar diagnostic (TRUE METRIX GLUCOSE TEST STRIP) Strp Check 1 time daily    blood-glucose meter (TRUE METRIX GLUCOSE METER) kit To check blood sugar    calcium citrate-vitamin D3 315-200 mg (CITRACAL+D) 315-200 mg-unit per tablet Take 1 tablet by mouth 2 (two) times daily.    cetirizine (ZYRTEC) 10 MG tablet Take 1 tablet (10 mg total) by mouth once daily.    clotrimazole-betamethasone 1-0.05% (LOTRISONE) cream VAISHNAVI EXT AA BID FOR 7 DAYS    COMBIVENT RESPIMAT  mcg/actuation inhaler Inhale 2 puffs into the lungs every 6 (six) hours as needed for Wheezing or Shortness of Breath.    dexAMETHasone (DECADRON) 4 MG Tab Take 2 tablets (8 mg total) by mouth once daily. Take as directed on days 2 and 3 of your chemotherapy cycle.    fentaNYL (DURAGESIC) 25 mcg/hr Place 1 patch onto the skin every 72 hours. for 15 days    flash glucose scanning reader (FREESTYLE ANISH 14 DAY READER) Misc 1 each by Misc.(Non-Drug; Combo Route) route once daily.    fluticasone propionate (FLOVENT DISKUS) 250 mcg/actuation DsDv Inhale 1 puff into the lungs 2 (two) times a day. Controller    folic acid (FOLVITE) 1 MG tablet Take 1,000 mcg by mouth once daily.    gabapentin (NEURONTIN) 100 MG capsule Take 1 capsule (100 mg total) by mouth 3 (three) times daily.    hydrOXYchloroQUINE " RODRIGUE Reddy    Primary Insurance: BLUE CROSS  Secondary Insurance:     ASSESSMENT:  Active Health Care Proxies       Karina Cano Health Care Representative - Spouse   Primary Phone: [phone] (Home)  Mobile Phone: [phone]                 Readmission Root Cause  30 Day Readmission: Yes  During your hospital stay, did someone (provider, nurse, ) explain your care to you in a way you could understand?: Yes  Did you feel medically stable to leave the hospital?: Yes  Were you able to pay for your medication at the pharmacy?: Yes  Did you have reliable transportation to take you to your appointments?: Yes  During previous admission, was a post-acute recommendation made?: Yes  What post-acute resources were offered?: Offered, but declined  Patient was readmitted due to: CHF  Action Plan: awaiting medical stability, declining HH    Patient Information  Admitted from:: Home  Mental Status: Alert  During Assessment patient was accompanied by: Not accompanied during assessment  Assessment information provided by:: Patient  Primary Caregiver: Self  Support Systems: Self, Spouse/significant other  County of Residence: [place]  What city do you live in?: [place]  Home entry access options. Select all that apply.: Stairs  Number of steps to enter home.: 6  Type of Current Residence: 2 story home  Upon entering residence, is there a bedroom on the main floor (no further steps)?: No  A bedroom is located on the following floor levels of residence (select all that apply):: 2nd Floor  Upon entering residence, is there a bathroom on the main floor (no further steps)?: No  Indicate which floors of current residence have a bathroom (select all the apply):: 2nd Floor  Number of steps to 2nd floor from main floor: One Flight  Living Arrangements: Lives w/ Spouse/significant other  Is patient a ?: No    Activities of Daily Living Prior to Admission  Functional Status: Independent  Completes ADLs  (PLAQUENIL) 200 mg tablet Take 200 mg by mouth 3 (three) times daily.    JANUVIA 50 mg Tab Take 50 mg by mouth once daily.    lancets (ACCU-CHEK FASTCLIX LANCET DRUM) Misc 1 each by Misc.(Non-Drug; Combo Route) route 2 (two) times a day.    lancets (ACCU-CHEK SOFTCLIX LANCETS) Misc To test glucose twice daily.    lancets (TRUEPLUS LANCETS) 30 gauge Misc As indicated    LIDOcaine-prilocaine (EMLA) cream Apply to port site 30-60 minutes prior to chemotherapy and cover with saran wrap.    LORazepam (ATIVAN) 1 MG tablet Take 1 tablet (1 mg total) by mouth On call Procedure for Anxiety (take 1 hour prior to mri).    methotrexate 2.5 MG Tab Take 20 mg by mouth once a week.    naloxone (NARCAN) 4 mg/actuation Spry 4mg by nasal route as needed for opioid overdose; may repeat every 2-3 minutes in alternating nostrils until medical help arrives. Call 911    ondansetron (ZOFRAN-ODT) 8 MG TbDL Dissolve 1 tablet (8 mg total) by mouth every 8 (eight) hours as needed (chemo related nausea).    oxyCODONE (ROXICODONE) 5 mg/5 mL Soln Take 10 mLs (10 mg total) by mouth every 4 (four) hours as needed (pain).    pantoprazole (PROTONIX) 40 MG tablet Take 1 tablet (40 mg total) by mouth once daily.    promethazine (PHENERGAN) 25 MG tablet Take 1 tablet (25 mg total) by mouth every 6 (six) hours as needed for Nausea (use when zofran does not work).    senna-docusate 8.6-50 mg (PERICOLACE) 8.6-50 mg per tablet Take 1 tablet by mouth once daily.    SITagliptin (JANUVIA) 100 MG Tab Take 1 tablet (100 mg total) by mouth once daily.    tiZANidine (ZANAFLEX) 4 MG tablet Take 1 tablet (4 mg total) by mouth every evening.    [DISCONTINUED] loratadine (CLARITIN) 10 mg tablet Take 10 mg by mouth once daily.       Review of patient's allergies indicates:   Allergen Reactions    Pyridium [phenazopyridine] Anaphylaxis, Hives and Swelling    Succinylcholine Anaphylaxis     [facility] - 7/2017  During intubation, she had  independently?: Yes  Ambulates independently?: Yes  Does patient use assisted devices?: Yes  Assisted Devices (DME) used: Straight Cane  Does patient have a history of Outpatient Therapy (PT/OT)?: No  Does the patient have a history of Short-Term Rehab?: No  Does patient have a history of HHC?: No  Does patient currently have HHC?: No    Patient Information Continued  Income Source: SSI/SSD  Does patient have prescription coverage?: Yes  Can the patient afford their medications and any related supplies (such as glucometers or test strips)?: Yes  Does patient receive dialysis treatments?: No  Does patient have a history of substance abuse?: No  Does patient have a history of Mental Health Diagnosis?: No    Means of Transportation  Means of Transport to Appts:: Drives Self          DISCHARGE DETAILS:    Discharge planning discussed with:: Patient  Freedom of Choice: Yes  Comments - Freedom of Choice: Discussed FOC  CM contacted family/caregiver?: No- see comments (declined)       Other Referral/Resources/Interventions Provided:  Referral Comments: This CM introduced self and role to patient at bedside.  30 day readmit.  Patient lives wtih spouse and children in 2 story home, 6 [address], IADLS, has cane at home.  No history of STR, OP therapy, and HH. This CM discussed setting up HH for patient for CHF follow up. Patient declining HH at this time                                                                            laryngeal edema, difficulty with the airway and surgery was postponed, patient went to ICU intubated. Per reports, she also had tachycardia induced st depression.   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                  Aspirin Hives and Nausea And Vomiting       Past Medical History:   Diagnosis Date    Arthritis     Rheumatoid    Asthma     Cancer     lung    COPD (chronic obstructive pulmonary disease)     GERD (gastroesophageal reflux disease)      Past Surgical History:   Procedure Laterality Date    ANKLE FRACTURE SURGERY      CARPAL TUNNEL RELEASE      CYSTOSCOPY      DIRECT DIAGNOSTIC LARYNGOSCOPY WITH BRONCHOSCOPY AND ESOPHAGOSCOPY N/A 6/8/2020    Procedure: LARYNGOSCOPY, DIRECT, DIAGNOSTIC,With BIOPSy;  Surgeon: Bernard Daley MD;  Location: [facility] OR [address] [place] [address];  Service: ENT;  Laterality: N/A;  Dedo laryngoscope, lens pan, airway basic, microlaryngeal instruments.     ENDOBRONCHIAL ULTRASOUND N/A 7/9/2019    Procedure: ENDOBRONCHIAL ULTRASOUND (EBUS);  Surgeon: Alisson Madsen MD;  Location: [facility] OR [address] [place] [address];  Service: Pulmonary;  Laterality: N/A;    ESOPHAGOGASTRODUODENOSCOPY N/A 10/25/2021    Procedure: EGD (ESOPHAGOGASTRODUODENOSCOPY);  Surgeon: Farida Iverson MD;  Location: [facility] ENDO ([address] [place] [address]);  Service: Endoscopy;  Laterality: N/A;  7/2017 During intubation, she had laryngeal edema, difficulty with the airway and surgery was postponed from Allergy: Succinylcholine  , pt states no longer on Eliquis, instr portal -ml  pt completed COVID vaccine- see Immunization record in chart-rb      FUSION OF CERVICAL SPINE BY POSTERIOR APPROACH N/A 9/5/2022    Procedure: FUSION, SPINE, CERVICAL, POSTERIOR APPROACH;  Surgeon: Dixie Martinez MD;  Location: [facility] OR [address] [place] [address];  Service: Neurosurgery;  Laterality: N/A;  C3-T1    HYSTERECTOMY      INSERTION OF PLEURAL CATHETER Right 6/8/2020    Procedure:  INSERTION-CATHETER-CHEST;  Surgeon: Jeferson Collier MD;  [facility];  Service: Thoracic;  Laterality: Right;  Possible PleurX    INSERTION OF TUNNELED CENTRAL VENOUS CATHETER (CVC) WITH SUBCUTANEOUS PORT Left 8/7/2019    Procedure: [id number]-Z-CATH LEFT POSS RIGHT;  Surgeon: Jeferson Coker MD;  Location: [facility] OR [address] [place] [address];  Service: General;  Laterality: Left;  SUCCINYLCHOLINE ALLERGY    INSERTION OF TUNNELED CENTRAL VENOUS CATHETER (CVC) WITH SUBCUTANEOUS PORT Right 1/13/2022    Procedure: INSERTION, PORT-A-CATH;  Surgeon: Freddie Patel MD;  Location: [facility] CATH LAB;  Service: Radiology;  Laterality: Right;    PARTIAL HYSTERECTOMY      THORACOSCOPIC BIOPSY OF PLEURA Right 6/8/2020    Procedure: VATS, WITH PLEURA BIOPSY and drainage of pleural effusion;  Surgeon: Jeferson Collier MD;  Location: [facility] OR [address] [place] [address];  Service: Thoracic;  Laterality: Right;     Family History       Problem Relation (Age of Onset)    Breast cancer Maternal Aunt    Cancer Father    Heart disease Mother          Tobacco Use    Smoking status: Former     Packs/day: 1.00     Years: 45.00     Pack years: 45.00     Types: Cigarettes    Smokeless tobacco: Never   Substance and Sexual Activity    Alcohol use: No    Drug use: No    Sexual activity: Not on file     Review of Systems   Constitutional:  Negative for chills, fever and unexpected weight change.   HENT:  Positive for trouble swallowing. Negative for congestion, ear discharge, ear pain, hearing loss, nosebleeds, sore throat and voice change.    Eyes:  Negative for pain and visual disturbance.   Respiratory:  Negative for cough, shortness of breath and stridor.    Cardiovascular:  Negative for chest pain.   Gastrointestinal:  Negative for abdominal pain, nausea and vomiting.   Skin:  Negative for rash and wound.   Objective:     Vital Signs (Most Recent):  Temp: 98 °F (36.7 °C) (09/07/22 1125)  Pulse: (!) 133 (09/07/22 1517)  Resp: 18 (09/07/22  1243)  BP: (!) 146/88 (09/07/22 1125)  SpO2: 95 % (09/07/22 1125)   Vital Signs (24h Range):  Temp:  [97.9 °F (36.6 °C)-99.5 °F (37.5 °C)] 98 °F (36.7 °C)  Pulse:  [130-140] 133  Resp:  [16-20] 18  SpO2:  [94 %-99 %] 95 %  BP: (122-164)/(87-93) 146/88     Weight: 84.4 kg (186 lb)  Body mass index is 37.57 kg/m².    Date 09/07/22 0700 - 09/08/22 0659   Shift 8098-2779 6856-7287 5099-1494 24 Hour Total   INTAKE   Shift Total(mL/kg)       OUTPUT   Urine(mL/kg/hr) 500(0.7)   500   Shift Total(mL/kg) 500(5.9)   500(5.9)   Weight (kg) 84.4 84.4 84.4 84.4       Physical Exam  Vitals reviewed.   Constitutional:       General: She is not in acute distress.     Appearance: Normal appearance. She is obese. She is not ill-appearing.   HENT:      Head: Normocephalic and atraumatic.      Jaw: No trismus, tenderness or malocclusion.      Salivary Glands: Right salivary gland is not diffusely enlarged or tender. Left salivary gland is not diffusely enlarged or tender.      Right Ear: Hearing and external ear normal. No decreased hearing noted. No drainage.      Left Ear: Hearing and external ear normal. No decreased hearing noted. No drainage.      Nose: Nose normal. No nasal deformity, signs of injury, congestion or rhinorrhea.      Right Nostril: No epistaxis.      Left Nostril: No epistaxis.      Mouth/Throat:      Lips: Pink. No lesions.      Mouth: Mucous membranes are moist. No oral lesions or angioedema.      Dentition: No gum lesions.      Tongue: No lesions. Tongue does not deviate from midline.      Palate: No mass and lesions.      Pharynx: Oropharynx is clear. Uvula midline. No pharyngeal swelling or posterior oropharyngeal erythema.      Tonsils: No tonsillar exudate or tonsillar abscesses.      Comments: OP patent, ecchymosis of the soft palate, likely from recent intubation  Eyes:      General: Lids are normal.         Right eye: No discharge.         Left eye: No discharge.      Extraocular Movements: Extraocular  movements intact.      Conjunctiva/sclera: Conjunctivae normal.      Pupils: Pupils are equal, round, and reactive to light.   Neck:      Thyroid: No thyroid mass or thyromegaly.      Trachea: Trachea and phonation normal.      Comments: Exam limited due to C-collar  Cardiovascular:      Rate and Rhythm: Normal rate.   Pulmonary:      Effort: Pulmonary effort is normal. No accessory muscle usage, respiratory distress or retractions.      Breath sounds: No stridor.   Musculoskeletal:      Cervical back: No muscular tenderness.   Lymphadenopathy:      Cervical: No cervical adenopathy.   Neurological:      Mental Status: She is alert and oriented to person, place, and time.      Cranial Nerves: No facial asymmetry.     Flexible Fiberoptic Laryngoscopy     Nasopharynx - the torus is clear. There are no lesions of the posterior wall.   Oropharynx - no lesions of the tongue base. There is no obvious fullness or asymmetry. Slight fullness of the L oropharynx/palatopharyngeus, possibly due to positioning  Hypopharynx - there are no lesions of the pyriform sinuses or postcricoid region    Larynx - there are no lesions of the supraglottic or glottic larynx. Vocal fold mobility is normal with complete closure. No obvious masses or lesions. Copious secretions noted.       Significant Labs:  BMP:   Recent Labs   Lab 09/05/22  0604 09/06/22  0825 09/07/22  0435   *   < > 188*   CL 98   < > 101   CO2 30*   < > 33*   BUN 10   < > 14   CREATININE 0.6   < > 0.5   CALCIUM 10.3   < > 9.7   MG 1.8  --   --     < > = values in this interval not displayed.     CBC:   Recent Labs   Lab 09/07/22  0435   WBC 10.87   RBC 3.84*   HGB 11.3*   HCT 35.7*      MCV 93   MCH 29.4   MCHC 31.7*       Significant Diagnostics:  I have reviewed and interpreted all pertinent imaging results/findings within the past 24 hours. Retropharyngeal fullness seen on c-spine xray - nonspecific      Assessment/Plan:     Swallowing difficulty  66F w  dysphagia and NPO recs per SLP following posterior c-spine fusion. FFL exam wnl, no obvious masses or lesions.    - No acute ENT intervention at this time  - Suspect dysphagia may be related to trauma from intubation or perhaps localized swelling following ETT or spine surgery. Pt reports improvement in breathing over last 48h.  - Would not reocmmend further imaging at this time, but can consider CT neck w contrast if patient fails to improve over the next few days and does not improve w SLP  - Discussed w Dr. Macias  - Please page w questions      VTE Risk Mitigation (From admission, onward)         Ordered     Place HANS hose  Until discontinued         09/05/22 1746     IP VTE HIGH RISK PATIENT  Once         09/05/22 1746     Place sequential compression device  Until discontinued         09/05/22 1746     Place sequential compression device  Until discontinued         09/02/22 8858                Thank you for your consult. I will sign off. Please contact us if you have any additional questions.    John Rajput MD  Otorhinolaryngology-Head & Neck Surgery  [facility] - Oncology ([facility])

## 2025-04-20 NOTE — ASSESSMENT & PLAN NOTE
Well-controlled based on last hemoglobin A1c  Monitor on sliding scale  Hypoglycemia protocol  Consistent carb diet

## 2025-04-20 NOTE — ASSESSMENT & PLAN NOTE
Creatinine 1.45 on admission; below baseline  Monitor daily BMP with diuresis  Creatinine today is 1.42

## 2025-04-20 NOTE — H&P
H&P - Hospitalist   Name: Mesfin Cano 59 y.o. male I MRN: [id number]  Unit/Bed#: [facility] I Date of Admission: 4/19/2025   Date of Service: 4/19/2025 I Hospital Day: 0     Assessment & Plan  Acute on chronic diastolic CHF (congestive heart failure) (HCC)  Patient well-known to the facility with recurrent admissions for CHF  History of preserved EF  Last hospitalized 3/11 3/20  Presents with 15 pound weight gain over the last 48 hours, increasing dyspnea on exertion, and lower extremity edema  patient reports compliance with home meds, fluid restriction, and sodium restriction.      Will place on Bumex drip 1 mg/h  Monitor on telemetry  Monitor intake/output, and daily weights  Monitor electrolytes closely with diuresis; Daily BMP  GDMT: Continue home Jardiance, spironolactone, metoprolol  Heart failure consultation  Stage 3 chronic kidney disease (HCC)  Creatinine 1.45 on admission; below baseline  Monitor daily BMP with diuresis  Primary hypertension  Monitor on home metoprolol and with diuresis  Morbid obesity (HCC)  BMI 42; affects all levels of care, diet and lifestyle modifications recommended  Paroxysmal atrial fibrillation (HCC)  Continue metoprolol succinate for rate control  Continue home Eliquis for stroke prophylaxis  Monitor on telemetry  Presence of Transfer To HF system  Heart failure consultation  Type 2 diabetes mellitus without complication, without long-term current use of insulin (HCC)  Well-controlled based on last hemoglobin A1c  Monitor on sliding scale  Hypoglycemia protocol  Consistent carb diet        VTE Pharmacologic Prophylaxis: VTE Score: 4 Moderate Risk (Score 3-4) - Pharmacological DVT Prophylaxis Ordered: apixaban (Eliquis).  Code Status: Level 1 - Full Code   Discussion with family: Patient declined call to .     Anticipated Length of Stay: Patient will be admitted on an inpatient basis with an anticipated length of stay of greater than 2 midnights secondary to acute CHF, IV  diuresis, medication titration, serial lab monitoring, clinical monitoring.    History of Present Illness   Chief Complaint: weight gain    Mesfin Cano is a 59 y.o. male with a PMH of chronic diastolic CHF, CKD 3, hyperlipidemia, hypertension, who presents with a number of symptoms.  Patient is well-known to [facility] due to recurring heart failure.  This will be his seventh admission in the last 12 months.  He was last hospitalized from 3/11 to 3/20 for diuresis as well.  The patient reports that he had been doing well up until the last 4 days hours.  Over this time period he has developed worsening dyspnea on exertion, lower extremity edema, and a reported weight gain of 15 pounds.  He reports that his dry weight is around 310, and he weighed himself at 327 pounds today.  At home he states that he has been compliant with all his meds as prescribed, he has been maintaining a low-sodium and fluid restricted diet.  Unclear etiology for this exacerbation.  In the emergency room workup was generally benign but will need admitted for ongoing diuresis.  He typically requires Bumex drip as he is on significant amount of oral diuretics at home with what appears to be poor response.  Otherwise patient was pleasant and conversive in no distress during my evaluation.  Agreeable to inpatient workup and treatment.    REVIEW OF SYSTEMS  General Denies fevers or chills. Denies generalized weakness or fatigue.    HEENT Denies hearing or vision changes.   Cardiovascular Denies chest pain.  Positive for LE swelling. Denies palpitations.  Positive for dyspnea on exertion.   Respiratory Denies cough. Denies difficulty breathing. Denies shortness of breath.   Genitourinary Denies hematuria. Denies dysuria. Denies difficulty voiding.Denies incontinence.   Gastrointestinal Denies nausea, vomiting, or diarrhea. Denies hematochezia, melena, or hematemesis.    Musculoskeletal Denies arthralgias or myalgias. Denies joint  swelling.   Psychiatric  Denies changes in mood. Denies anxiety or depression.   Neurologic Denies headache. Denies lightheadedness/dizziness.Denies numbness/tingling. Denies weakness.   Endocrine Denies weight loss. Denies excessive thirst, sweating, urination.  Positive for weight gain.         Historical Information   Past Medical History:   Diagnosis Date    Abnormal stress test 06/26/2024    Acute gastritis without hemorrhage     last assessed 3/24/17    Acute on chronic diastolic heart failure (HCC) 01/04/2025    Asthma     Atrial fibrillation (HCC)     Bilateral leg edema 02/19/2020    CHF (congestive heart failure) (HCC)     CHF, acute on chronic (HCC) 03/12/2025    Coronary artery disease     Daytime sleepiness 07/17/2019    Diabetes mellitus (HCC)     Dyspnea on exertion 10/11/2021    Edema of both feet 01/23/2020    Electrolyte abnormality 10/05/2024    Gastric bypass status for obesity     HNP (herniated nucleus pulposus), lumbar 02/23/2021    Hyperlipidemia     Hypertension     Hypokalemia 11/14/2021    Impaired fasting glucose     last assessed 5/10/17    Knee sprain, bilateral 06/14/2018    Leg cramps 06/16/2022    Obesity     Status post cholecystectomy 02/17/2024    Uncontrolled atrial flutter (HCC) 06/18/2024    Viral gastroenteritis     last assessed 11/4/16     Past Surgical History:   Procedure Laterality Date    CARDIAC CATHETERIZATION N/A 3/9/2022    Procedure: CARDIAC RHC W/ PRESSURE SENSOR;  Surgeon: Aminata Wilcox MD;  Location: [facility];  Service: Cardiology    CARDIAC CATHETERIZATION N/A 9/6/2022    Procedure: Cardiac catheterization;  Surgeon: Yolanda Del Rosario DO;  Location: [facility];  Service: Cardiology    CARDIAC CATHETERIZATION N/A 9/6/2022    Procedure: Cardiac Coronary Angiogram;  Surgeon: Yolanda Del Rosario DO;  [facility]: [facility];  Service: Cardiology    CARDIAC CATHETERIZATION N/A 10/11/2023    Procedure: Cardiac RHC;  Surgeon: Yoel Darden  MD;  Location: [facility];  Service: Cardiology    CARPAL TUNNEL RELEASE      bilateral    CHOLECYSTECTOMY LAPAROSCOPIC N/A 2/7/2024    Procedure: CHOLECYSTECTOMY LAPAROSCOPIC;  Surgeon: Nena Ferguson MD;  Location: [facility];  Service: General    GASTRIC BYPASS  2004    with han en y    HERNIA REPAIR      ventral inscisional    IR BIOPSY BONE MARROW  12/14/2023    LAPAROSCOPIC TRANSGASTRIC ACCESS FOR ERCP N/A 2/7/2024    Procedure: LAPAROSCOPIC TRANSGASTRIC ACCESS FOR ERCP;  Surgeon: Nena Ferguson MD;  Location: [facility];  Service: General    LAPAROSCOPIC TRANSGASTRIC ACCESS FOR ERCP N/A 2/7/2024    Procedure: ERCP, sphincterotomy, stone extraction;  Surgeon: Rey Ferguson MD;  Location: [facility];  Service: Gastroenterology    TONSILLECTOMY       Social History     Tobacco Use    Smoking status: Former     Current packs/day: 1.00     Average packs/day: 1 pack/day for 5.0 years (5.0 ttl pk-yrs)     Types: Pipe, Cigars, Cigarettes     Start date: 2016     Quit date: 1/1/2021     Passive exposure: Never    Smokeless tobacco: Former    Tobacco comments:     cigar once a day   Vaping Use    Vaping status: Never Used   Substance and Sexual Activity    Alcohol use: Yes     Alcohol/week: 2.0 standard drinks of alcohol     Types: 2 Shots of liquor per week     Comment: social    Drug use: No    Sexual activity: Yes     Partners: Female     Birth control/protection: None     E-Cigarette/Vaping    E-Cigarette Use Never User      E-Cigarette/Vaping Substances    Nicotine No     THC No     CBD No     Flavoring No     Other No     Unknown No      Family History   Problem Relation Age of Onset    Stroke Mother     Hypertension Father     Cancer Father     COPD Father      Social History:  Marital Status: /Civil Union   Occupation:   Patient Pre-hospital Living Situation: Home  Patient Pre-hospital Level of Mobility: walks  Patient Pre-hospital Diet Restrictions: Low-sodium and fluid  restriction    Meds/Allergies   I have reviewed home medications using recent Epic encounter.  Prior to Admission medications    Medication Sig Start Date End Date Taking? Authorizing Provider   Accu-Chek FastClix Lancets MISC Test blood sugar twice daily 11/16/21   Soo Chavez MD   acetaminophen (TYLENOL) 325 mg tablet Take 2 tablets (650 mg total) by mouth every 6 (six) hours as needed for mild pain, headaches or fever 2/12/24   Paulino Brown MD   acetaminophen (TYLENOL) 650 mg CR tablet Take 1 tablet (650 mg total) by mouth every 8 (eight) hours as needed for mild pain 1/14/25   Robert Ramirez MD   albuterol (PROVENTIL HFA,VENTOLIN HFA) 90 mcg/act inhaler Inhale 2 puffs every 4 (four) hours as needed for wheezing 1/15/25   Soo Chavez MD   apixaban (Eliquis) 5 mg Take 1 tablet (5 mg total) by mouth 2 (two) times a day 1/2/25   Aminata Wilcox MD   atorvastatin (LIPITOR) 10 mg tablet TAKE 1 TABLET BY MOUTH EVERY DAY 2/11/25   Aminata Wilcox MD   Blood Glucose Monitoring Suppl (Accu-Chek Guide) w/Device KIT Use 2 (two) times a day Test blood sugar twice daily 8/5/21   Soo Chavez MD   budesonide-formoterol (Symbicort) 160-4.5 mcg/act inhaler Inhale 2 puffs 2 (two) times a day Rinse mouth after use. 3/10/25   RODRIGUE Scott   bumetanide (BUMEX) 2 mg tablet TAKE 3 TABLETS (6 MG TOTAL) BY MOUTH 3 (THREE) TIMES A DAY 2/17/25   Aminata Wilcox MD   cyanocobalamin (VITAMIN B-12) 1000 MCG tablet Take 1 tablet (1,000 mcg total) by mouth daily 10/10/24   Travis Champion MD   Empagliflozin (JARDIANCE) 10 MG TABS tablet Take 1 tablet (10 mg total) by mouth daily 4/9/25   Aster García PA-C   famotidine (PEPCID) 20 mg tablet Take 1 tablet (20 mg total) by mouth if needed for heartburn As needed twice a day 10/10/24   Travis Champion MD   fluticasone (FLONASE) 50 mcg/act nasal spray 1 spray into each nostril 2 (two) times a day 10/12/23   RODRIGUE Pickens   loratadine  (CLARITIN) 10 mg tablet Take 1 tablet (10 mg total) by mouth if needed for allergies As needed daily 10/10/24   Travis Champion MD   magnesium Oxide (MAG-OX) 400 mg TABS Take 1 tablet (400 mg total) by mouth 2 (two) times a day 3/20/25   Meghan Paul MD   metolazone (ZAROXOLYN) 2.5 mg tablet Take 2 tablets (5 mg total) by mouth 3 (three) times a week Take 20-30 minutes before Bumex dose and with additional potassium 11/29/24   RODRIGUE Wagner   metoprolol succinate (TOPROL-XL) 50 mg 24 hr tablet TAKE 1 TABLET BY MOUTH EVERY DAY 12/13/24   Aminata Wilcox MD   montelukast (SINGULAIR) 10 mg tablet Take 1 tablet (10 mg total) by mouth daily at bedtime 2/3/22 4/9/25  Soo Chavez MD   nitroglycerin (NITROSTAT) 0.4 mg SL tablet Place 1 tablet (0.4 mg total) under the tongue every 5 (five) minutes as needed for chest pain for up to 50 doses 6/27/24   Earl Sarkar MD   potassium chloride (Klor-Con M20) 20 mEq tablet Take 2 tablets (40 mEq total) by mouth 3 (three) times a day with meals 3/20/25   Meghan Paul MD   pregabalin (LYRICA) 50 mg capsule Take 1 caps. at bedtime 3/4/25   RODRIGUE Scott   sitaGLIPtin (Januvia) 100 mg tablet TAKE 1/2 TABLET BY MOUTH EVERY DAY 1/16/25   Soo Chavez MD   sodium chloride (OCEAN) 0.65 % nasal spray 1 spray into each nostril every hour as needed for congestion 11/29/24   RODRIGUE Wagner   spironolactone (ALDACTONE) 25 mg tablet Take 1 tablet (25 mg total) by mouth daily 4/4/25   RODRIGUE Rodriges   tiotropium (Spiriva Respimat) 1.25 MCG/ACT AERS inhaler Inhale 2 puffs daily 1/16/25 4/9/25  Soo Chavez MD     Allergies   Allergen Reactions    Azithromycin Other (See Comments)     Shaky, uneasy feeling     Bactrim [Sulfamethoxazole-Trimethoprim] Other (See Comments)     shakiness       Objective :  HR:  [87] 87  BP: (124)/(63) 124/63  Resp:  [20] 20  SpO2:  [95 %] 95 %  O2 Device: None (Room air)    PHYSICAL EXAM:    Vitals signs  reviewed  Constitutional   Awake and cooperative. NAD.   Head/Neck   Normocephalic. Atraumatic.   HEENT   No scleral icterus. EOMI.   Heart   Regular rate and rhythm. No murmurs.   Lungs   Clear to auscultation bilaterally. Respirations unlaboured.   Abdomen   Soft. Nontender. Nondistended.    Skin   Skin color normal. No rashes.   Extremities   No deformities.  Bilateral lower extremity edema present.   Neuro   Alert and oriented.   Psych   Mood stable. Affect normal.                 Lab Results: I have reviewed the following results:  Results from last 7 days   Lab Units 04/19/25  1848   WBC Thousand/uL 7.28   HEMOGLOBIN g/dL 12.0   HEMATOCRIT % 37.0   PLATELETS Thousands/uL 313   SEGS PCT % 66   LYMPHO PCT % 17   MONO PCT % 8   EOS PCT % 7*     Results from last 7 days   Lab Units 04/19/25  1848   SODIUM mmol/L 137   POTASSIUM mmol/L 3.8   CHLORIDE mmol/L 101   CO2 mmol/L 23   BUN mg/dL 24   CREATININE mg/dL 1.45*   ANION GAP mmol/L 13   CALCIUM mg/dL 7.7*   GLUCOSE RANDOM mg/dL 170*             Lab Results   Component Value Date    HGBA1C 6.3 03/10/2025    HGBA1C 7.0 (H) 12/04/2024    HGBA1C 7.3 (H) 11/23/2024           Imaging Results Review: I reviewed radiology reports from this admission including: chest xray.  Other Study Results Review: EKG was reviewed.     Administrative Statements   I have spent a total time of 60 minutes in caring for this patient on the day of the visit/encounter including Diagnostic results, Prognosis, Risks and benefits of tx options, Instructions for management, Patient and family education, Importance of tx compliance, Risk factor reductions, Impressions, Counseling / Coordination of care, Documenting in the medical record, Reviewing/placing orders in the medical record (including tests, medications, and/or procedures), Obtaining or reviewing history  , and Communicating with other healthcare professionals .    ** Please Note: This note has been constructed using a voice  recognition system. **

## 2025-04-20 NOTE — CONSULTS
"                Advanced Heart Failure / Pulmonary Hypertension Service Consultation    Mesfin Cano 59 y.o. male  MRN: [id number]  Unit/Bed#: [facility] 520-01; Encounter: [id number]    Assessment:  Principal Problem:    Acute on chronic diastolic CHF (congestive heart failure) (HCC)  Active Problems:    Primary hypertension    Morbid obesity (HCC)    Paroxysmal atrial fibrillation (HCC)    Stage 3 chronic kidney disease (HCC)    Type 2 diabetes mellitus without complication, without long-term current use of insulin (HCC)    Presence of CardioMEMS HF system      HPI:   Mesfin Cano is a 59-year-old man with PMH as below who presented to [facility] on 04/19/2025 (7th admission in past 12 months) with reported 15 lbs weight gain in past 48 hours.    Patient seen and examined. Reports feeling well on 04/18; weight on home scale of 314 lbs. Then on 04/19, noted weight of 327 lbs in the afternoon with associated TELLES and bilateral LE swelling.  Denies lightheadedness, chest pain, wheezing, SOB at rest, and orthopnea.   Denies any recent dietary indiscretions. Reports sticking to 2 L fluid restriction in outpatient setting. Denies missing medication doses (taking Bumex 6 mg TID and metolazone 5 mg TuThSat as prescribed). Reports less or urine output with diuretics over past few days. Denies recent illness/sick contacts.    Heart failure service was consulted for \"CHF exacerbation.\" Patient follows with Dr. Wilcox for outpatient cardiology.     Objective:   Intake/ Output: 290 mL / 1050 mL.  Weight: 324 lbs.   Telemetry:   MAPs: .    Today's Plan:  Continue Bumex drip at 1 mg/hr. Of note, was held for ~3 hours this AM. Does not examine floridly volume overloaded. May be able to transition back to oral diuretics in next few days. Estimated dry weight in high 310s, per chart review.  Potassium 3.9 this AM. Resume home potassium regimen.   Magnesium 1.8 this AM; goal >2.0. IV supplementation ordered.     Plan:  Acute on " chronic HFpEF; LVEF 55%              Impression: unclear etiology of current exacerbation. ?increased Afib burden.  RHC / MEMS calibration 10/11/2023: CardioMEMS data correlates to RHC.     Guideline Directed Medical Therapy:  --Aldosterone Antagonist: spironolactone 25 mg daily.   --SGLT2 Inhibitor: empagliflozin 10 mg daily.      Volume Management:  --Home Diuretic: Bumex 6 mg TID with metolazone 5 mg TTSa.   --Inpatient Diuretic: IV Bumex 1 mg/hr.   --K supplementation: potassium 40 mEq TID.                  Advanced Therapies:              --CardioMEMS: implanted 03/09/2022. Goal PAd of 12 mmHg.     Atrial fibrillation, parosxysmal              [id number] = 3 (HF, HTN, DM).              Anticoagulation on Eliquis.               Rate control: metoprolol succinate 50 mg daily.              Rhythm control: No.     Chronic kidney disease, stage IIIa  Hypertension  Hyperlipidemia  Asthma, severe persistent: follows with Dr. Noonan as outpatient.   Obstructive sleep apnea  Diabetes mellitus, type II  History of gastric bypass (Samuel-en-Y) surgery   History of tobacco abuse    Past Medical History:   Diagnosis Date    Abnormal stress test 06/26/2024    Acute gastritis without hemorrhage     last assessed 3/24/17    Acute on chronic diastolic heart failure (HCC) 01/04/2025    Asthma     Atrial fibrillation (HCC)     Bilateral leg edema 02/19/2020    CHF (congestive heart failure) (HCC)     CHF, acute on chronic (HCC) 03/12/2025    Coronary artery disease     Daytime sleepiness 07/17/2019    Diabetes mellitus (HCC)     Dyspnea on exertion 10/11/2021    Edema of both feet 01/23/2020    Electrolyte abnormality 10/05/2024    Gastric bypass status for obesity     HNP (herniated nucleus pulposus), lumbar 02/23/2021    Hyperlipidemia     Hypertension     Hypokalemia 11/14/2021    Impaired fasting glucose     last assessed 5/10/17    Knee sprain, bilateral 06/14/2018    Leg cramps 06/16/2022    Obesity     Status post  cholecystectomy 02/17/2024    Uncontrolled atrial flutter (HCC) 06/18/2024    Viral gastroenteritis     last assessed 11/4/16       Review of Systems   Constitutional:  Positive for unexpected weight change. Negative for activity change, appetite change and fatigue.   Respiratory:  Positive for shortness of breath (with exertion). Negative for cough and chest tightness.    Cardiovascular:  Positive for leg swelling. Negative for chest pain and palpitations.   Gastrointestinal:  Negative for abdominal distention, abdominal pain and nausea.   Genitourinary:  Positive for decreased urine volume. Negative for dysuria and urgency.   Skin: Negative.    Neurological:  Negative for dizziness, syncope, weakness and light-headedness.   Psychiatric/Behavioral:  Negative for confusion and sleep disturbance. The patient is not nervous/anxious.          Current Facility-Administered Medications:     acetaminophen (TYLENOL) tablet 650 mg, 650 mg, Oral, Q6H PRN, Randy Reilly DO    albuterol (PROVENTIL HFA,VENTOLIN HFA) inhaler 2 puff, 2 puff, Inhalation, Q4H PRN, Randy Reilly DO    apixaban (ELIQUIS) tablet 5 mg, 5 mg, Oral, BID, Randy Reilly DO, 5 mg at 04/20/25 0835    atorvastatin (LIPITOR) tablet 10 mg, 10 mg, Oral, Daily, Randy Reilly DO, 10 mg at 04/20/25 0835    budesonide-formoterol (SYMBICORT) 160-4.5 mcg/act inhaler 2 puff, 2 puff, Inhalation, BID, Randy Reilly DO, 2 puff at 04/20/25 0837    bumetanide (BUMEX) 12.5 mg infusion 50 mL, 1 mg/hr, Intravenous, Continuous, Randy Reilly DO, Last Rate: 4 mL/hr at 04/20/25 0846, 1 mg/hr at 04/20/25 0846    cyanocobalamin (VITAMIN B-12) tablet 1,000 mcg, 1,000 mcg, Oral, Daily, Randy Reilly DO, 1,000 mcg at 04/20/25 0835    Empagliflozin (JARDIANCE) tablet 10 mg, 10 mg, Oral, Daily, Randy Reilly DO, 10 mg at 04/20/25 0836    insulin lispro (HumALOG/ADMELOG) 100 units/mL subcutaneous injection  1-6 Units, 1-6 Units, Subcutaneous, HS, Randy Reilly DO, 1 Units at 04/19/25 2132    insulin lispro (HumALOG/ADMELOG) 100 units/mL subcutaneous injection 2-12 Units, 2-12 Units, Subcutaneous, TID AC, 2 Units at 04/20/25 0624 **AND** Fingerstick Glucose (POCT), , , TID AC, Randy Reilly DO    magnesium Oxide (MAG-OX) tablet 400 mg, 400 mg, Oral, BID, Randy Reilly, DO, 400 mg at 04/20/25 0835    magnesium sulfate 2 g/50 mL IVPB (premix) 2 g, 2 g, Intravenous, Once, Akiko Lowery MD, Last Rate: 25 mL/hr at 04/20/25 0931, 2 g at 04/20/25 0931    metoprolol succinate (TOPROL-XL) 24 hr tablet 50 mg, 50 mg, Oral, Daily, Randy Reilly DO, 50 mg at 04/20/25 0835    ondansetron (ZOFRAN) injection 4 mg, 4 mg, Intravenous, Q6H PRN, Randy Reilly DO    potassium chloride (Klor-Con M20) CR tablet 40 mEq, 40 mEq, Oral, TID With Meals, Aster García PA-C    pregabalin (LYRICA) capsule 50 mg, 50 mg, Oral, HS, Randy Reilly, DO, 50 mg at 04/19/25 2132    spironolactone (ALDACTONE) tablet 25 mg, 25 mg, Oral, Daily, Randy Reilly DO, 25 mg at 04/20/25 0835    umeclidinium 62.5 mcg/actuation inhaler AEPB 1 puff, 1 puff, Inhalation, Daily, Randy Reilly DO, 1 puff at 04/20/25 0837    Allergies   Allergen Reactions    Azithromycin Other (See Comments)     Shaky, uneasy feeling     Bactrim [Sulfamethoxazole-Trimethoprim] Other (See Comments)     shakiness     Social History     Socioeconomic History    Marital status: /Civil Union     Spouse name: Not on file    Number of children: Not on file    Years of education: Not on file    Highest education level: Not on file   Occupational History    Not on file   Tobacco Use    Smoking status: Former     Current packs/day: 1.00     Average packs/day: 1 pack/day for 5.0 years (5.0 ttl pk-yrs)     Types: Pipe, Cigars, Cigarettes     Start date: 2016     Quit date: 1/1/2021     Passive exposure:  "Never    Smokeless tobacco: Former    Tobacco comments:     cigar once a day   Vaping Use    Vaping status: Never Used   Substance and Sexual Activity    Alcohol use: Yes     Alcohol/week: 2.0 standard drinks of alcohol     Types: 2 Shots of liquor per week     Comment: social    Drug use: No    Sexual activity: Yes     Partners: Female     Birth control/protection: None   Other Topics Concern    Not on file   Social History Narrative    Not on file     Social Drivers of Health     Financial Resource Strain: Not on file   Food Insecurity: No Food Insecurity (2025)    Nursing - Inadequate Food Risk Classification     Worried About Running Out of Food in the Last Year: Sometimes true     Ran Out of Food in the Last Year: Never true     Ran Out of Food in the Last Year: Never true   Transportation Needs: No Transportation Needs (2025)    Nursing - Transportation Risk Classification     Lack of Transportation: Not on file     Lack of Transportation: No   Physical Activity: Not on file   Stress: Not on file   Social Connections: Not on file   Intimate Partner Violence: Unknown (2025)    Nursing IPS     Feels Physically and Emotionally Safe: Not on file     Physically Hurt by Someone: Not on file     Humiliated or Emotionally Abused by Someone: Not on file     Physically Hurt by Someone: No     Hurt or Threatened by Someone: No   Housing Stability: Unknown (2025)    Nursing: Inadequate Housing Risk Classification     Has Housing: Not on file     Worried About Losing Housing: Not on file     Unable to Get Utilities: Not on file     Unable to Pay for Housing in the Last Year: No     Has Housin     Family History   Problem Relation Age of Onset    Stroke Mother     Hypertension Father     Cancer Father     COPD Father        Vitals:  Blood pressure 145/69, pulse 82, temperature (!) 97.3 °F (36.3 °C), resp. rate 17, height 6' 1\" (1.854 m), weight (!) 147 kg (324 lb 4.8 oz), SpO2 95%.    I/O last 3 " completed shifts:  In: 290 [P.O.:240; I.V.:50]  Out: 1050 [Urine:1050]    Weight (last 2 days)       Date/Time Weight    04/20/25 0600 147 (324.3)    04/20/25 0300 147 (324.3)    04/19/25 2047 147 (323.2)          Wt Readings from Last 10 Encounters:   04/20/25 (!) 147 kg (324 lb 4.8 oz)   04/14/25 (!) 145 kg (319 lb 3.2 oz)   04/09/25 (!) 145 kg (319 lb 6.4 oz)   03/26/25 (!) 147 kg (325 lb)   03/20/25 (!) 141 kg (309 lb 15.5 oz)   03/10/25 (!) 151 kg (333 lb 3.2 oz)   01/31/25 (!) 143 kg (315 lb)   01/09/25 (!) 143 kg (315 lb 0.6 oz)   12/04/24 (!) 147 kg (323 lb 6.4 oz)   11/28/24 (!) 143 kg (314 lb 9.6 oz)       Vitals:    04/20/25 0600 04/20/25 0633 04/20/25 0835 04/20/25 0835   BP:  146/69 145/69 145/69   BP Location:       Pulse:   82 82   Resp:  17     Temp:  (!) 97.3 °F (36.3 °C)     SpO2:    95%   Weight: (!) 147 kg (324 lb 4.8 oz)      Height:           Physical Exam  Vitals reviewed.   Constitutional:       General: He is awake. He is not in acute distress.     Appearance: Normal appearance. He is well-developed and overweight. He is not toxic-appearing or diaphoretic.   HENT:      Head: Normocephalic.      Nose: Nose normal.   Cardiovascular:      Rate and Rhythm: Normal rate. Rhythm irregular. No extrasystoles are present.  Pulmonary:      Effort: Pulmonary effort is normal. No tachypnea, bradypnea or respiratory distress.      Breath sounds: Normal air entry. No wheezing or rales.   Abdominal:      General: There is no distension.      Palpations: Abdomen is soft.   Musculoskeletal:      Cervical back: Neck supple.      Right lower leg: Edema present.      Left lower leg: Edema present.   Skin:     General: Skin is warm and dry.      Coloration: Skin is pale. Skin is not jaundiced.   Neurological:      Mental Status: He is alert and oriented to person, place, and time.   Psychiatric:         Attention and Perception: Attention normal.         Mood and Affect: Mood and affect normal.         Speech:  Speech normal.         Behavior: Behavior normal. Behavior is cooperative.         Thought Content: Thought content normal.       Labs & Results:      Results from last 7 days   Lab Units 04/20/25  0445 04/19/25  1848   WBC Thousand/uL 6.47 7.28   HEMOGLOBIN g/dL 11.8* 12.0   HEMATOCRIT % 36.0* 37.0   PLATELETS Thousands/uL 283 313         Results from last 7 days   Lab Units 04/20/25  0445 04/19/25  1848   POTASSIUM mmol/L 3.9 3.8   CHLORIDE mmol/L 96 101   CO2 mmol/L 29 23   BUN mg/dL 25 24   CREATININE mg/dL 1.42* 1.45*   CALCIUM mg/dL 7.9* 7.7*         Aster García PA-C

## 2025-04-20 NOTE — PROGRESS NOTES
Progress Note - Hospitalist   Name: Mesfin Cano 59 y.o. male I MRN: [id number]  Unit/Bed#: [facility] 520-01 I Date of Admission: 4/19/2025   Date of Service: 4/20/2025 I Hospital Day: 1    Assessment & Plan  Acute on chronic diastolic CHF (congestive heart failure) (HCC)  Patient well-known to the facility with recurrent admissions for CHF  History of preserved EF  Last hospitalized 3/11 3/20  Presents with 15 pound weight gain over the last 48 hours, increasing dyspnea on exertion, and lower extremity edema  patient reports compliance with home meds, fluid restriction, and sodium restriction.    Started on Bumex drip one 1 mg/h which we will continue  Monitor on telemetry  Monitor intake/output, and daily weights  Monitor electrolytes closely with diuresis; Daily BMP  GDMT: Continue home Jardiance, spironolactone, metoprolol  Heart failure consultation appreciated  Stage 3 chronic kidney disease (HCC)  Creatinine 1.45 on admission; below baseline  Monitor daily BMP with diuresis  Creatinine today is 1.42  Primary hypertension  Monitor on home metoprolol and with diuresis  Morbid obesity (HCC)  BMI 42; affects all levels of care, diet and lifestyle modifications recommended  Paroxysmal atrial fibrillation (HCC)  Continue metoprolol succinate for rate control  Continue home Eliquis for stroke prophylaxis  Monitor on telemetry  Presence of CiDRA HF system  Heart failure consultation  Type 2 diabetes mellitus without complication, without long-term current use of insulin (HCC)  Well-controlled based on last hemoglobin A1c  Monitor on sliding scale  Hypoglycemia protocol  Consistent carb diet      VTE Pharmacologic Prophylaxis: VTE Score: 4 Moderate Risk (Score 3-4) - Pharmacological DVT Prophylaxis Ordered: apixaban (Eliquis).    Mobility:   Basic Mobility Inpatient Raw Score: 23  JH-HLM Goal: 7: Walk 25 feet or more  JH-HLM Achieved: 6: Walk 10 steps or more  JH-HLM Goal achieved. Continue to encourage appropriate  mobility.    Patient Centered Rounds: I performed bedside rounds with nursing staff today.   Discussions with Specialists or Other Care Team Provider:     Education and Discussions with Family / Patient: Updated patient    Current Length of Stay: 1 day(s)  Current Patient Status: Inpatient   Certification Statement: The patient will continue to require additional inpatient hospital stay due to IV diuresis  Discharge Plan:     Code Status: Level 1 - Full Code    Subjective   Patient seen and examined.  Shortness of breath with activities.  Uses pillows to prop him up to sleep at home.  Patient gained about 15 pounds overnight before coming to the hospital.  Patient reported that at home his dry weight is 310 pounds    Objective :  Temp:  [97.3 °F (36.3 °C)-97.9 °F (36.6 °C)] 97.5 °F (36.4 °C)  HR:  [71-87] 72  BP: (124-146)/(63-88) 144/75  Resp:  [16-20] 17  SpO2:  [95 %-98 %] 98 %  O2 Device: None (Room air)    Body mass index is 42.79 kg/m².     Input and Output Summary (last 24 hours):     Intake/Output Summary (Last 24 hours) at 4/20/2025 1557  Last data filed at 4/20/2025 1448  Gross per 24 hour   Intake 788.66 ml   Output 3450 ml   Net -2661.34 ml       Physical Exam  Constitutional:       General: He is not in acute distress.     Appearance: He is obese.   HENT:      Head: Normocephalic.      Right Ear: There is no impacted cerumen.      Nose: No congestion.      Mouth/Throat:      Pharynx: No oropharyngeal exudate.   Eyes:      General:         Right eye: No discharge.   Cardiovascular:      Rate and Rhythm: Normal rate. Rhythm irregular.      Pulses: Normal pulses.      Heart sounds: No murmur heard.  Pulmonary:      Effort: No respiratory distress.      Comments:  Decreased breath sounds bilateral  Abdominal:      General: There is distension.      Tenderness: There is no abdominal tenderness.   Musculoskeletal:         General: No swelling. Normal range of motion.      Right lower leg: Edema present.       Left lower leg: Edema present.   Skin:     General: Skin is warm.   Neurological:      Mental Status: He is alert. Mental status is at baseline.           Lines/Drains:        Telemetry:  Telemetry Orders (From admission, onward)               24 Hour Telemetry Monitoring  Continuous x 24 Hours (Telem)        Expiring   Question:  Reason for 24 Hour Telemetry  Answer:  Decompensated CHF- and any one of the following: continuous diuretic infusion or total diuretic dose >200 mg daily, associated electrolyte derangement (I.e. K < 3.0), inotropic drip (continuous infusion), hx of ventricular arrhythmia, or new EF < 35%                     Telemetry Reviewed: Normal Sinus Rhythm  Indication for Continued Telemetry Use: Acute CHF on >200 mg lasix/day or equivalent dose or with new reduced EF.                Lab Results: I have reviewed the following results:   Results from last 7 days   Lab Units 04/20/25  0445 04/19/25  1848   WBC Thousand/uL 6.47 7.28   HEMOGLOBIN g/dL 11.8* 12.0   HEMATOCRIT % 36.0* 37.0   PLATELETS Thousands/uL 283 313   SEGS PCT %  --  66   LYMPHO PCT %  --  17   MONO PCT %  --  8   EOS PCT %  --  7*     Results from last 7 days   Lab Units 04/20/25  0445   SODIUM mmol/L 136   POTASSIUM mmol/L 3.9   CHLORIDE mmol/L 96   CO2 mmol/L 29   BUN mg/dL 25   CREATININE mg/dL 1.42*   ANION GAP mmol/L 11   CALCIUM mg/dL 7.9*   GLUCOSE RANDOM mg/dL 131         Results from last 7 days   Lab Units 04/20/25  1551 04/20/25  1105 04/20/25  0605 04/19/25  2100   POC GLUCOSE mg/dl 144* 160* 153* 171*               Recent Cultures (last 7 days):         Imaging Results Review: I reviewed radiology reports from this admission including: chest xray.      Last 24 Hours Medication List:     Current Facility-Administered Medications:     acetaminophen (TYLENOL) tablet 650 mg, Q6H PRN    albuterol (PROVENTIL HFA,VENTOLIN HFA) inhaler 2 puff, Q4H PRN    apixaban (ELIQUIS) tablet 5 mg, BID    atorvastatin (LIPITOR) tablet 10  mg, Daily    budesonide-formoterol (SYMBICORT) 160-4.5 mcg/act inhaler 2 puff, BID    bumetanide (BUMEX) 12.5 mg infusion 50 mL, Continuous, Last Rate: 1 mg/hr (04/20/25 1446)    cyanocobalamin (VITAMIN B-12) tablet 1,000 mcg, Daily    Empagliflozin (JARDIANCE) tablet 10 mg, Daily    insulin lispro (HumALOG/ADMELOG) 100 units/mL subcutaneous injection 1-6 Units, HS    insulin lispro (HumALOG/ADMELOG) 100 units/mL subcutaneous injection 2-12 Units, TID AC **AND** Fingerstick Glucose (POCT), TID AC    magnesium Oxide (MAG-OX) tablet 400 mg, BID    metoprolol succinate (TOPROL-XL) 24 hr tablet 50 mg, Daily    ondansetron (ZOFRAN) injection 4 mg, Q6H PRN    potassium chloride (Klor-Con M20) CR tablet 40 mEq, TID With Meals    pregabalin (LYRICA) capsule 50 mg, HS    spironolactone (ALDACTONE) tablet 25 mg, Daily    umeclidinium 62.5 mcg/actuation inhaler AEPB 1 puff, Daily    Administrative Statements   Today, Patient Was Seen By: Akiko Lowery MD      **Please Note: This note may have been constructed using a voice recognition system.**

## 2025-04-20 NOTE — ASSESSMENT & PLAN NOTE
Patient well-known to the facility with recurrent admissions for CHF  History of preserved EF  Last hospitalized 3/11 3/20  Presents with 15 pound weight gain over the last 48 hours, increasing dyspnea on exertion, and lower extremity edema  patient reports compliance with home meds, fluid restriction, and sodium restriction.    Started on Bumex drip one 1 mg/h which we will continue  Monitor on telemetry  Monitor intake/output, and daily weights  Monitor electrolytes closely with diuresis; Daily BMP  GDMT: Continue home Jardiance, spironolactone, metoprolol  Heart failure consultation appreciated

## 2025-04-20 NOTE — ASSESSMENT & PLAN NOTE
Continue metoprolol succinate for rate control  Continue home Eliquis for stroke prophylaxis  Monitor on telemetry

## 2025-04-21 ENCOUNTER — PATIENT OUTREACH (OUTPATIENT)
Dept: CARDIOLOGY CLINIC | Facility: CLINIC | Age: 60
End: 2025-04-21

## 2025-04-21 LAB
ANION GAP SERPL CALCULATED.3IONS-SCNC: 10 MMOL/L (ref 4–13)
ANION GAP SERPL CALCULATED.3IONS-SCNC: 12 MMOL/L (ref 4–13)
ATRIAL RATE: 82 BPM
BUN SERPL-MCNC: 26 MG/DL (ref 5–25)
BUN SERPL-MCNC: 28 MG/DL (ref 5–25)
CALCIUM SERPL-MCNC: 8.3 MG/DL (ref 8.4–10.2)
CALCIUM SERPL-MCNC: 8.6 MG/DL (ref 8.4–10.2)
CHLORIDE SERPL-SCNC: 91 MMOL/L (ref 96–108)
CHLORIDE SERPL-SCNC: 92 MMOL/L (ref 96–108)
CO2 SERPL-SCNC: 32 MMOL/L (ref 21–32)
CO2 SERPL-SCNC: 32 MMOL/L (ref 21–32)
CREAT SERPL-MCNC: 1.69 MG/DL (ref 0.6–1.3)
CREAT SERPL-MCNC: 1.69 MG/DL (ref 0.6–1.3)
ERYTHROCYTE [DISTWIDTH] IN BLOOD BY AUTOMATED COUNT: 14.9 % (ref 11.6–15.1)
GFR SERPL CREATININE-BSD FRML MDRD: 44 ML/MIN/1.73SQ M
GFR SERPL CREATININE-BSD FRML MDRD: 44 ML/MIN/1.73SQ M
GLUCOSE SERPL-MCNC: 173 MG/DL (ref 65–140)
GLUCOSE SERPL-MCNC: 177 MG/DL (ref 65–140)
GLUCOSE SERPL-MCNC: 179 MG/DL (ref 65–140)
GLUCOSE SERPL-MCNC: 179 MG/DL (ref 65–140)
GLUCOSE SERPL-MCNC: 184 MG/DL (ref 65–140)
GLUCOSE SERPL-MCNC: 192 MG/DL (ref 65–140)
HCT VFR BLD AUTO: 37.3 % (ref 36.5–49.3)
HGB BLD-MCNC: 11.9 G/DL (ref 12–17)
MAGNESIUM SERPL-MCNC: 2.2 MG/DL (ref 1.9–2.7)
MCH RBC QN AUTO: 30.4 PG (ref 26.8–34.3)
MCHC RBC AUTO-ENTMCNC: 31.9 G/DL (ref 31.4–37.4)
MCV RBC AUTO: 95 FL (ref 82–98)
P AXIS: 47 DEGREES
PLATELET # BLD AUTO: 270 THOUSANDS/UL (ref 149–390)
PMV BLD AUTO: 10.1 FL (ref 8.9–12.7)
POTASSIUM SERPL-SCNC: 3.8 MMOL/L (ref 3.5–5.3)
POTASSIUM SERPL-SCNC: 3.8 MMOL/L (ref 3.5–5.3)
PR INTERVAL: 144 MS
QRS AXIS: 62 DEGREES
QRSD INTERVAL: 78 MS
QT INTERVAL: 404 MS
QTC INTERVAL: 472 MS
RBC # BLD AUTO: 3.91 MILLION/UL (ref 3.88–5.62)
SODIUM SERPL-SCNC: 134 MMOL/L (ref 135–147)
SODIUM SERPL-SCNC: 135 MMOL/L (ref 135–147)
T WAVE AXIS: 84 DEGREES
VENTRICULAR RATE: 82 BPM
WBC # BLD AUTO: 6.57 THOUSAND/UL (ref 4.31–10.16)

## 2025-04-21 PROCEDURE — 93010 ELECTROCARDIOGRAM REPORT: CPT | Performed by: INTERNAL MEDICINE

## 2025-04-21 PROCEDURE — 85027 COMPLETE CBC AUTOMATED: CPT | Performed by: INTERNAL MEDICINE

## 2025-04-21 PROCEDURE — 99232 SBSQ HOSP IP/OBS MODERATE 35: CPT | Performed by: FAMILY MEDICINE

## 2025-04-21 PROCEDURE — 83735 ASSAY OF MAGNESIUM: CPT

## 2025-04-21 PROCEDURE — 80048 BASIC METABOLIC PNL TOTAL CA: CPT

## 2025-04-21 PROCEDURE — 99232 SBSQ HOSP IP/OBS MODERATE 35: CPT | Performed by: INTERNAL MEDICINE

## 2025-04-21 PROCEDURE — 82948 REAGENT STRIP/BLOOD GLUCOSE: CPT

## 2025-04-21 RX ORDER — METOLAZONE 5 MG/1
5 TABLET ORAL ONCE
Status: COMPLETED | OUTPATIENT
Start: 2025-04-21 | End: 2025-04-21

## 2025-04-21 RX ADMIN — INSULIN LISPRO 2 UNITS: 100 INJECTION, SOLUTION INTRAVENOUS; SUBCUTANEOUS at 11:15

## 2025-04-21 RX ADMIN — SPIRONOLACTONE 25 MG: 25 TABLET, FILM COATED ORAL at 08:34

## 2025-04-21 RX ADMIN — ATORVASTATIN CALCIUM 10 MG: 10 TABLET, FILM COATED ORAL at 08:33

## 2025-04-21 RX ADMIN — PREGABALIN 50 MG: 50 CAPSULE ORAL at 22:35

## 2025-04-21 RX ADMIN — METOLAZONE 5 MG: 5 TABLET ORAL at 14:30

## 2025-04-21 RX ADMIN — APIXABAN 5 MG: 5 TABLET, FILM COATED ORAL at 08:33

## 2025-04-21 RX ADMIN — BUDESONIDE AND FORMOTEROL FUMARATE DIHYDRATE 2 PUFF: 160; 4.5 AEROSOL RESPIRATORY (INHALATION) at 17:00

## 2025-04-21 RX ADMIN — CYANOCOBALAMIN TAB 500 MCG 1000 MCG: 500 TAB at 08:33

## 2025-04-21 RX ADMIN — Medication 1 MG/HR: at 11:05

## 2025-04-21 RX ADMIN — INSULIN LISPRO 2 UNITS: 100 INJECTION, SOLUTION INTRAVENOUS; SUBCUTANEOUS at 06:21

## 2025-04-21 RX ADMIN — EMPAGLIFLOZIN 10 MG: 10 TABLET, FILM COATED ORAL at 08:35

## 2025-04-21 RX ADMIN — METOPROLOL SUCCINATE 50 MG: 50 TABLET, EXTENDED RELEASE ORAL at 08:33

## 2025-04-21 RX ADMIN — POTASSIUM CHLORIDE 40 MEQ: 1500 TABLET, EXTENDED RELEASE ORAL at 17:00

## 2025-04-21 RX ADMIN — MAGNESIUM OXIDE TAB 400 MG (241.3 MG ELEMENTAL MG) 400 MG: 400 (241.3 MG) TAB at 08:34

## 2025-04-21 RX ADMIN — MAGNESIUM OXIDE TAB 400 MG (241.3 MG ELEMENTAL MG) 400 MG: 400 (241.3 MG) TAB at 17:00

## 2025-04-21 RX ADMIN — Medication 1 MG/HR: at 03:29

## 2025-04-21 RX ADMIN — UMECLIDINIUM 1 PUFF: 62.5 AEROSOL, POWDER ORAL at 08:34

## 2025-04-21 RX ADMIN — Medication 1 MG/HR: at 17:31

## 2025-04-21 RX ADMIN — INSULIN LISPRO 2 UNITS: 100 INJECTION, SOLUTION INTRAVENOUS; SUBCUTANEOUS at 17:00

## 2025-04-21 RX ADMIN — POTASSIUM CHLORIDE 40 MEQ: 1500 TABLET, EXTENDED RELEASE ORAL at 07:31

## 2025-04-21 RX ADMIN — INSULIN LISPRO 2 UNITS: 100 INJECTION, SOLUTION INTRAVENOUS; SUBCUTANEOUS at 22:30

## 2025-04-21 RX ADMIN — APIXABAN 5 MG: 5 TABLET, FILM COATED ORAL at 17:00

## 2025-04-21 RX ADMIN — POTASSIUM CHLORIDE 40 MEQ: 1500 TABLET, EXTENDED RELEASE ORAL at 11:15

## 2025-04-21 RX ADMIN — BUDESONIDE AND FORMOTEROL FUMARATE DIHYDRATE 2 PUFF: 160; 4.5 AEROSOL RESPIRATORY (INHALATION) at 08:34

## 2025-04-21 NOTE — PROGRESS NOTES
Advanced Heart Failure / Pulmonary Hypertension Service Progress Note    Mesfin Cano 59 y.o. male   MRN: [id number]  Unit/Bed#: [facility] 520-01; Encounter: [id number]    Assessment:  Principal Problem:    Acute on chronic diastolic CHF (congestive heart failure) (HCC)  Active Problems:    Primary hypertension    Morbid obesity (HCC)    Paroxysmal atrial fibrillation (HCC)    Stage 3 chronic kidney disease (HCC)    Presence of CardioMEMS HF system    Type 2 diabetes mellitus without complication, without long-term current use of insulin (HCC)      Mesfin Cano is a 59-year-old man with PMH as below who presented to [facility] on 04/19/2025 (7th admission in past 12 months) with reported 15 lbs weight gain in past 48 hours. Reported feeling well on 04/18; weight on home scale of 314 lbs. Then on 04/19, noted weight of 327 lbs in the afternoon with associated TELLES and bilateral LE swelling.  Denies lightheadedness, chest pain, wheezing, SOB at rest, and orthopnea.  Reports less or urine output with diuretics over past few days.    Subjective:   Patient seen and examined. States he remains above his BL weight on 310 and TELLES remains worse than baseline.      Objective:   Intake/ Output: 991/ 5350 (-4.3)  Weight: admit 323-->321 (-2 lbs, )  Telemetry: SR, Afib.  HR 80's    Today's Plan:  Continue Bumex gtt 1 mg/hr.  Remains 10 lbs over baseline weight.  Will add Metolazone 5 mg today.  Potential to increase o/p Metolazone to 7.5 mg three times/weekly for better volume control.  Continue Toprol XL 50 Qd, Jardiance 10 Qd, spironolactone 25 QD  Repeat BMP tonight and replace electrolytes as needed  Continue daily standing weights, I/O, 1800 FR        Plan:  Acute on chronic HFpEF; LVEF 55%              Impression: unclear etiology of current exacerbation. ?increased Afib burden.  RHC / MEMS calibration 10/11/2023: CardioMEMS data correlates to RHC.      Guideline Directed Medical Therapy:  --Aldosterone  "Antagonist: spironolactone 25 mg daily.   --SGLT2 Inhibitor: empagliflozin 10 mg daily.      Volume Management:  --Home Diuretic: Bumex 6 mg TID with metolazone 5 mg TTSa.   --Inpatient Diuretic: IV Bumex 1 mg/hr.   --K supplementation: potassium 40 mEq TID.                  Advanced Therapies:              --CardioMEMS: implanted 03/09/2022. Goal PAd of 12 mmHg.     Atrial fibrillation, parosxysmal              [id number] = 3 (HF, HTN, DM).              Anticoagulation on Eliquis.               Rate control: metoprolol succinate 50 mg daily.              Rhythm control: No.     Chronic kidney disease, stage IIIa (1.5-2.0)  Hypertension  Hyperlipidemia  Asthma, severe persistent: follows with Dr. Noonan as outpatient.   Obstructive sleep apnea  Diabetes mellitus, type II  History of gastric bypass (Samuel-en-Y) surgery   History of tobacco abuse      Vitals:   Blood pressure 105/74, pulse 71, temperature (!) 97.2 °F (36.2 °C), resp. rate 20, height 6' 1\" (1.854 m), weight (!) 147 kg (324 lb 4.8 oz), SpO2 97%.    Body mass index is 42.79 kg/m².    I/O last 3 completed shifts:  In: 1041.5 [P.O.:900; I.V.:91.5; IV Piggyback:50]  Out: 3900 [Urine:3900]    Wt Readings from Last 10 Encounters:   04/20/25 (!) 147 kg (324 lb 4.8 oz)   04/14/25 (!) 145 kg (319 lb 3.2 oz)   04/09/25 (!) 145 kg (319 lb 6.4 oz)   03/26/25 (!) 147 kg (325 lb)   03/20/25 (!) 141 kg (309 lb 15.5 oz)   03/10/25 (!) 151 kg (333 lb 3.2 oz)   01/31/25 (!) 143 kg (315 lb)   01/09/25 (!) 143 kg (315 lb 0.6 oz)   12/04/24 (!) 147 kg (323 lb 6.4 oz)   11/28/24 (!) 143 kg (314 lb 9.6 oz)     Vitals:    04/20/25 0835 04/20/25 1107 04/20/25 1507 04/20/25 1850   BP: 145/69 143/73 144/75 105/74   BP Location:  Left arm Left arm    Pulse: 82 71 72 71   Resp:  16 17 20   Temp:  (!) 97.3 °F (36.3 °C) 97.5 °F (36.4 °C) (!) 97.2 °F (36.2 °C)   TempSrc:  Oral Oral    SpO2: 95% 97% 98% 97%   Weight:       Height:           Physical Exam  Constitutional:       " Appearance: He is obese.   Neck:      Vascular: JVD present.   Cardiovascular:      Rate and Rhythm: Normal rate and regular rhythm.      Heart sounds: S1 normal and S2 normal.   Pulmonary:      Effort: No respiratory distress.      Breath sounds: Normal air entry. Decreased breath sounds present.      Comments: Pt report TELLES remains worse than baseline  Musculoskeletal:      Right lower le+ Edema present.      Left lower le+ Edema present.   Skin:     General: Skin is warm and dry.   Neurological:      Mental Status: He is alert and oriented to person, place, and time.   Psychiatric:         Behavior: Behavior is cooperative.         Cognition and Memory: Cognition normal.           Current Facility-Administered Medications:     acetaminophen (TYLENOL) tablet 650 mg, 650 mg, Oral, Q6H PRN, Randy Reilly DO    albuterol (PROVENTIL HFA,VENTOLIN HFA) inhaler 2 puff, 2 puff, Inhalation, Q4H PRN, Randy Reilly DO    apixaban (ELIQUIS) tablet 5 mg, 5 mg, Oral, BID, Randy Reilly DO, 5 mg at 25 1650    atorvastatin (LIPITOR) tablet 10 mg, 10 mg, Oral, Daily, Randy Reilly DO, 10 mg at 25 0835    budesonide-formoterol (SYMBICORT) 160-4.5 mcg/act inhaler 2 puff, 2 puff, Inhalation, BID, Randy Reilly DO, 2 puff at 25 1651    bumetanide (BUMEX) 12.5 mg infusion 50 mL, 1 mg/hr, Intravenous, Continuous, Randy Reilly DO, Last Rate: 4 mL/hr at 25 1446, 1 mg/hr at 25 1446    cyanocobalamin (VITAMIN B-12) tablet 1,000 mcg, 1,000 mcg, Oral, Daily, Randy Reilyl DO, 1,000 mcg at 25 0835    Empagliflozin (JARDIANCE) tablet 10 mg, 10 mg, Oral, Daily, Randy Reilly DO, 10 mg at 25 0836    insulin lispro (HumALOG/ADMELOG) 100 units/mL subcutaneous injection 1-6 Units, 1-6 Units, Subcutaneous, HS, Randy Reilly DO, 2 Units at 25    insulin lispro (HumALOG/ADMELOG) 100 units/mL  subcutaneous injection 2-12 Units, 2-12 Units, Subcutaneous, TID AC, 2 Units at 04/20/25 1159 **AND** Fingerstick Glucose (POCT), , , TID AC, Randy Reilly DO    magnesium Oxide (MAG-OX) tablet 400 mg, 400 mg, Oral, BID, Randy Reilly DO, 400 mg at 04/20/25 1650    metoprolol succinate (TOPROL-XL) 24 hr tablet 50 mg, 50 mg, Oral, Daily, Randy Reilly DO, 50 mg at 04/20/25 0835    ondansetron (ZOFRAN) injection 4 mg, 4 mg, Intravenous, Q6H PRN, Randy Reilly DO    potassium chloride (Klor-Con M20) CR tablet 40 mEq, 40 mEq, Oral, TID With Meals, Aster García PA-C, 40 mEq at 04/20/25 1650    pregabalin (LYRICA) capsule 50 mg, 50 mg, Oral, HS, Randy Reilly DO, 50 mg at 04/20/25 2053    spironolactone (ALDACTONE) tablet 25 mg, 25 mg, Oral, Daily, Randy Reilly, DO, 25 mg at 04/20/25 0835    umeclidinium 62.5 mcg/actuation inhaler AEPB 1 puff, 1 puff, Inhalation, Daily, Randy Reilly DO, 1 puff at 04/20/25 0837    Labs & Results:      Results from last 7 days   Lab Units 04/20/25  0445 04/19/25  1848   WBC Thousand/uL 6.47 7.28   HEMOGLOBIN g/dL 11.8* 12.0   HEMATOCRIT % 36.0* 37.0   PLATELETS Thousands/uL 283 313         Results from last 7 days   Lab Units 04/20/25  0445 04/19/25  1848   POTASSIUM mmol/L 3.9 3.8   CHLORIDE mmol/L 96 101   CO2 mmol/L 29 23   BUN mg/dL 25 24   CREATININE mg/dL 1.42* 1.45*   CALCIUM mg/dL 7.9* 7.7*             Thank you for the opportunity to participate in the care of this patient.    RODRIGUE Flynn  Advanced Heart Failure  [facility]

## 2025-04-21 NOTE — UTILIZATION REVIEW
Initial Clinical Review    Admission: Date/Time/Statement:   Admission Orders (From admission, onward)       Ordered        04/19/25 2010  Inpatient Admission  Once                          Orders Placed This Encounter   Procedures    Inpatient Admission     Standing Status:   Standing     Number of Occurrences:   1     Level of Care:   Med Surg [16]     Estimated length of stay:   More than 2 Midnights     Certification:   I certify that inpatient services are medically necessary for this patient for a duration of greater than two midnights. See H&P and MD Progress Notes for additional information about the patient's course of treatment.     ED Arrival Information       Expected   -    Arrival   4/19/2025 18:12    Acuity   Urgent              Means of arrival   Ambulance    Escorted by   [facility] ([place])    Service   Hospitalist    Admission type   Emergency              Arrival complaint   SOB             Chief Complaint   Patient presents with    Shortness of Breath     Pt c/o increased sob, and B/L LE swelling. Hx CHF. Pt states his weight jumped up from 314 to 327 lbs. Called cardiologist and was told to come to the ER.        Initial Presentation: 59 y.o. male with a PMH of chronic diastolic CHF, CKD 3, Aflutter, CAD, AFIB, DM, Gastric Bypass, HLD, HTN, who presented by EMS to [facility] ED. Admitted as Inpatient for evaluation and treatment of Acute on chronic CHF     Presented w/ worsening TELLES, LE edema w/ 15 lbs weight gain last 4 days. He reports that his dry weight is around 310, and he weighed himself at 327 pounds today. This is pt's 7th admission in the last 12 months. On exam, B/L LE edema present, lungs clear. Abnormal Labs , Creat 1.45.. CXR: Pulmonary vascular congestion. In the ED pt given Bumex IV x1, Eliquis and Mg oxide.     Plan: Tele, Start Bumex gtt, Monitor I/O and daily weights, Monitor electrolytes, Daily BMP, Continue home Jardiance,  Spironolactone and Metoprolol. Continue Eliquis. SSI, Carb control diet. Heart failure consulted.    Anticipated Length of Stay/Certification Statement:  Patient will be admitted on an inpatient basis with an anticipated length of stay of greater than 2 midnights secondary to acute CHF, IV diuresis, medication titration, serial lab monitoring, clinical monitoring.     Date: 4/20/25    Day 2:   Pt reports SOB w/ activities. Abnormal labs: Creat 1.42. Mg 1.8. Plan: See below heart failure consult plans. Continue Tele, Bumex gtt, Jardiance, Spironolactone, Metoprolol, Eliquis, Monitor BMP/     Heart failure consult: Acute on Chronic CHF. On exam, Edema B/L LE present. Plan: Continue Bumex gtt -of note, was held for 3 hrs this am. Does not examine floridly volume overloaded- may be able to transition to oral diuretics in next few days. Replete K and Mg.     Certification Statement: The patient will continue to require additional inpatient hospital stay due to IV diuresis     ED Treatment-Medication Administration from 04/19/2025 1812 to 04/19/2025 2043         Date/Time Order Dose Route Action     04/19/2025 1933 bumetanide (BUMEX) injection 2 mg 2 mg Intravenous Given     04/19/2025 2029 apixaban (ELIQUIS) tablet 5 mg 5 mg Oral Given     04/19/2025 2029 magnesium Oxide (MAG-OX) tablet 400 mg 400 mg Oral Given            Scheduled Medications:  apixaban, 5 mg, Oral, BID  atorvastatin, 10 mg, Oral, Daily  budesonide-formoterol, 2 puff, Inhalation, BID  cyanocobalamin, 1,000 mcg, Oral, Daily  Empagliflozin, 10 mg, Oral, Daily  insulin lispro, 1-6 Units, Subcutaneous, HS  insulin lispro, 2-12 Units, Subcutaneous, TID AC  magnesium Oxide, 400 mg, Oral, BID  metoprolol succinate, 50 mg, Oral, Daily  potassium chloride, 40 mEq, Oral, TID With Meals  pregabalin, 50 mg, Oral, HS  spironolactone, 25 mg, Oral, Daily  umeclidinium, 1 puff, Inhalation, Daily  magnesium sulfate 2 g/50 mL IVPB (premix) 2 g  Dose: 2 g  Freq: Once  Route: IV  Last Dose: Stopped (04/20/25 1359)  Start: 04/20/25 1000 End: 04/20/25 1359  potassium chloride (Klor-Con M20) CR tablet 40 mEq  Dose: 40 mEq  Freq: Once Route: PO  Start: 04/20/25 1000 End: 04/20/25 0926    Continuous IV Infusions:  bumetanide (BUMEX) 12.5 mg infusion 50 mL, 1 mg/hr, Intravenous, Continuous      PRN Meds:  acetaminophen, 650 mg, Oral, Q6H PRN  albuterol, 2 puff, Inhalation, Q4H PRN  ondansetron, 4 mg, Intravenous, Q6H PRN      ED Triage Vitals   Temperature Pulse Respirations Blood Pressure SpO2 Pain Score   04/19/25 2052 04/19/25 1818 04/19/25 1818 04/19/25 1818 04/19/25 1818 04/19/25 2047   97.9 °F (36.6 °C) 87 20 124/63 95 % No Pain     Weight (last 2 days)       Date/Time Weight    04/21/25 0555 146 (321.21)    04/21/25 0300 146 (321.21)    04/20/25 0600 147 (324.3)    04/20/25 0300 147 (324.3)    04/19/25 2047 147 (323.2)            Vital Signs (last 3 days)       Date/Time Temp Pulse Resp BP MAP (mmHg) SpO2 O2 Device Patient Position - Orthostatic VS Lilly Coma Scale Score Pain    04/21/25 10:53:10 97.4 °F (36.3 °C) 72 15 124/69 87 97 % None (Room air) Sitting -- --    04/21/25 08:33:16 -- 80 18 123/69 87 96 % None (Room air) Sitting -- --    04/21/25 0800 -- -- -- -- -- -- -- -- 15 No Pain    04/21/25 07:39:10 98 °F (36.7 °C) 90 16 131/82 98 96 % None (Room air) Sitting -- No Pain    04/21/25 02:20:45 97.6 °F (36.4 °C) 71 16 122/73 89 96 % None (Room air) Lying -- --    04/20/25 21:53:08 97.9 °F (36.6 °C) -- 20 133/75 94 -- -- -- -- --    04/20/25 2100 -- -- -- -- -- -- -- -- -- No Pain    04/20/25 2000 -- -- -- -- -- -- None (Room air) -- -- --    04/20/25 18:50:12 97.2 °F (36.2 °C) 71 20 105/74 84 97 % -- -- -- --    04/20/25 15:07:59 97.5 °F (36.4 °C) 72 17 144/75 98 98 % None (Room air) Sitting -- --    04/20/25 11:07:34 97.3 °F (36.3 °C) 71 16 143/73 96 97 % -- Lying -- --    04/20/25 08:35:19 -- 82 -- 145/69 94 95 % -- -- -- --    04/20/25 0835 -- 82 -- 145/69 -- -- -- --  -- --    04/20/25 0830 -- -- -- -- -- -- -- -- -- No Pain    04/20/25 06:33:45 97.3 °F (36.3 °C) -- 17 146/69 95 -- -- -- -- --    04/20/25 0246 -- -- -- -- -- -- None (Room air) -- -- --    04/19/25 22:51:13 97.8 °F (36.6 °C) 86 20 143/88 106 97 % -- -- -- --    04/19/25 2100 -- -- -- -- -- -- None (Room air) -- -- No Pain    04/19/25 20:52:29 97.9 °F (36.6 °C) 82 17 142/87 105 96 % -- -- -- --    04/19/25 2047 -- -- -- -- -- -- None (Room air) -- -- No Pain    04/19/25 1830 -- -- -- -- -- -- None (Room air) -- -- --    04/19/25 1818 -- 87 20 124/63 89 95 % None (Room air) Sitting -- --              Pertinent Labs/Diagnostic Test Results:   Radiology:  XR chest portable   ED Interpretation by Edmundo Lee MD (04/19 1954)   Pulmonary vascular congestion      Final Interpretation by Carrie Marie MD (04/20 0819)      No acute cardiopulmonary disease.            Workstation performed: [id number]           Cardiology:  ECG 12 lead    by Interface, Ris Results In (04/21 0810)              Results from last 7 days   Lab Units 04/21/25  0437 04/20/25 0445 04/19/25  1848   WBC Thousand/uL 6.57 6.47 7.28   HEMOGLOBIN g/dL 11.9* 11.8* 12.0   HEMATOCRIT % 37.3 36.0* 37.0   PLATELETS Thousands/uL 270 283 313   TOTAL NEUT ABS Thousands/µL  --   --  4.85         Results from last 7 days   Lab Units 04/21/25  0905 04/20/25  0445 04/19/25  1848   SODIUM mmol/L 134* 136 137   POTASSIUM mmol/L 3.8 3.9 3.8   CHLORIDE mmol/L 92* 96 101   CO2 mmol/L 32 29 23   ANION GAP mmol/L 10 11 13   BUN mg/dL 26* 25 24   CREATININE mg/dL 1.69* 1.42* 1.45*   EGFR ml/min/1.73sq m 44 54 53   CALCIUM mg/dL 8.3* 7.9* 7.7*   MAGNESIUM mg/dL 2.2 1.8*  --          Results from last 7 days   Lab Units 04/21/25  1051 04/21/25  0615 04/20/25  2050 04/20/25  1551 04/20/25  1105 04/20/25  0605 04/19/25  2100   POC GLUCOSE mg/dl 184* 177* 198* 144* 160* 153* 171*     Results from last 7 days   Lab Units 04/21/25  0905 04/20/25  0445 04/19/25  1848    GLUCOSE RANDOM mg/dL 179* 131 170*           Results from last 7 days   Lab Units 04/19/25  1848   BNP pg/mL 214*         Past Medical History:   Diagnosis Date    Abnormal stress test 06/26/2024    Acute gastritis without hemorrhage     last assessed 3/24/17    Acute on chronic diastolic heart failure (HCC) 01/04/2025    Asthma     Atrial fibrillation (HCC)     Bilateral leg edema 02/19/2020    CHF (congestive heart failure) (HCC)     CHF, acute on chronic (HCC) 03/12/2025    Coronary artery disease     Daytime sleepiness 07/17/2019    Diabetes mellitus (HCC)     Dyspnea on exertion 10/11/2021    Edema of both feet 01/23/2020    Electrolyte abnormality 10/05/2024    Gastric bypass status for obesity     HNP (herniated nucleus pulposus), lumbar 02/23/2021    Hyperlipidemia     Hypertension     Hypokalemia 11/14/2021    Impaired fasting glucose     last assessed 5/10/17    Knee sprain, bilateral 06/14/2018    Leg cramps 06/16/2022    Obesity     Status post cholecystectomy 02/17/2024    Uncontrolled atrial flutter (HCC) 06/18/2024    Viral gastroenteritis     last assessed 11/4/16     Present on Admission:   Type 2 diabetes mellitus without complication, without long-term current use of insulin (HCC)   Paroxysmal atrial fibrillation (HCC)   Presence of CardioMEMS HF system   Morbid obesity (HCC)   Primary hypertension   Acute on chronic diastolic CHF (congestive heart failure) (HCC)   Stage 3 chronic kidney disease (HCC)      Admitting Diagnosis: SOB (shortness of breath) [R06.02]  CHF exacerbation (HCC) [I50.9]  Age/Sex: 59 y.o. male    Network Utilization Review Department  ATTENTION: Please call with any questions or concerns to [phone] and carefully listen to the prompts so that you are directed to the right person. All voicemails are confidential.   For Discharge needs, contact Care Management DC Support Team at [phone] opt. 2  Send all requests for admission clinical reviews, approved or denied  determinations and any other requests to dedicated fax number below belonging to the campus where the patient is receiving treatment. List of dedicated fax numbers for the Facilities:  FACILITY NAME UR FAX NUMBER   ADMISSION DENIALS (Administrative/Medical Necessity) [phone]   DISCHARGE SUPPORT TEAM (NETWORK) [phone]   PARENT CHILD HEALTH (Maternity/NICU/Pediatrics) [phone]   [facility] [phone]   [facility] [phone]   [facility] [phone]   [facility] [phone]   [facility] [phone]   [facility] [phone]   [facility] [phone]   [facility] [phone]   [facility] [phone]   [facility] [phone]   [facility] [phone]   [facility] [phone]

## 2025-04-21 NOTE — ED PROVIDER NOTES
Time reflects when diagnosis was documented in both MDM as applicable and the Disposition within this note       Time User Action Codes Description Comment    4/19/2025  7:21 PM Edmundo Lee Add [I50.9] CHF exacerbation (HCC)     4/21/2025  3:34 PM Akiko Lowery Add [B35.1] Onychomycosis           ED Disposition       ED Disposition   Admit    Condition   Stable    Date/Time   Sat Apr 19, 2025  7:20 PM    Comment   --             Assessment & Plan       Medical Decision Making  Patient is well-appearing on exam GCS 15, AO x 4.  On initial exam patient with normal work of breathing, lungs clear.  95% on room air.  Edematous in his legs, no concern for cellulitis or DVT given history and physical exam.  Bilateral equal swelling.  Abdomen is swollen but nontender.  He says he has been admitted to the hospital 27 times for his CHF exacerbations and requires diuresis every time.  He appears to be on maximal diuresis p.o. at home.  Plan for labs chest x-ray and Bumex IV here.  Plan for admission for diuresis    Differential diagnosis includes but is not limited to: CHF exacerbation, ANTONIO    Doubt ACS as patient has no chest pain.  Doubt acute heart failure given history and physical exam as well as temporality of his symptoms and similarity to his CHF exacerbations in the past.    Based on patient's clinical history and physical exam there are no red flag signs or symptoms     Patient denies any additional symptoms on direct questioning except those explicitly noted in the HPI and ROS.     Triage note was reviewed and patient asked directly about concerns mentioned in triage note.     I will order appropriate testing to narrow my differential    Unless otherwise noted:  - There is no language barrier  - Chart was reviewed   - Labs and imaging were reviewed    Amount and/or Complexity of Data Reviewed  Labs: ordered. Decision-making details documented in ED Course.  Radiology: ordered and independent interpretation  performed.    Risk  Prescription drug management.  Decision regarding hospitalization.        ED Course as of 04/21/25 1620   Sat Apr 19, 2025 1953 Creatinine(!): 1.45  Improved from baseline   1953 BNP(!): 214   1953 BNP(!): 214  Mildly elevated       Medications   acetaminophen (TYLENOL) tablet 650 mg (has no administration in time range)   ondansetron (ZOFRAN) injection 4 mg (has no administration in time range)   albuterol (PROVENTIL HFA,VENTOLIN HFA) inhaler 2 puff (has no administration in time range)   apixaban (ELIQUIS) tablet 5 mg (5 mg Oral Given 4/19/25 2029)   atorvastatin (LIPITOR) tablet 10 mg (has no administration in time range)   budesonide-formoterol (SYMBICORT) 160-4.5 mcg/act inhaler 2 puff (has no administration in time range)   cyanocobalamin (VITAMIN B-12) tablet 1,000 mcg (has no administration in time range)   Empagliflozin (JARDIANCE) tablet 10 mg (has no administration in time range)   magnesium Oxide (MAG-OX) tablet 400 mg (400 mg Oral Given 4/19/25 2029)   metoprolol succinate (TOPROL-XL) 24 hr tablet 50 mg (has no administration in time range)   pregabalin (LYRICA) capsule 50 mg (has no administration in time range)   spironolactone (ALDACTONE) tablet 25 mg (has no administration in time range)   umeclidinium 62.5 mcg/actuation inhaler AEPB 1 puff (has no administration in time range)   insulin lispro (HumALOG/ADMELOG) 100 units/mL subcutaneous injection 2-12 Units (has no administration in time range)   insulin lispro (HumALOG/ADMELOG) 100 units/mL subcutaneous injection 1-6 Units (has no administration in time range)   bumetanide (BUMEX) injection 2 mg (2 mg Intravenous Given 4/19/25 1933)       ED Risk Strat Scores                    No data recorded                            History of Present Illness       Chief Complaint   Patient presents with    Shortness of Breath     Pt c/o increased sob, and B/L LE swelling. Hx CHF. Pt states his weight jumped up from 314 to 327 lbs.  Called cardiologist and was told to come to the ER.        Past Medical History:   Diagnosis Date    Abnormal stress test 06/26/2024    Acute gastritis without hemorrhage     last assessed 3/24/17    Acute on chronic diastolic heart failure (HCC) 01/04/2025    Asthma     Atrial fibrillation (HCC)     Bilateral leg edema 02/19/2020    CHF (congestive heart failure) (HCC)     CHF, acute on chronic (HCC) 03/12/2025    Coronary artery disease     Daytime sleepiness 07/17/2019    Diabetes mellitus (HCC)     Dyspnea on exertion 10/11/2021    Edema of both feet 01/23/2020    Electrolyte abnormality 10/05/2024    Gastric bypass status for obesity     HNP (herniated nucleus pulposus), lumbar 02/23/2021    Hyperlipidemia     Hypertension     Hypokalemia 11/14/2021    Impaired fasting glucose     last assessed 5/10/17    Knee sprain, bilateral 06/14/2018    Leg cramps 06/16/2022    Obesity     Status post cholecystectomy 02/17/2024    Uncontrolled atrial flutter (HCC) 06/18/2024    Viral gastroenteritis     last assessed 11/4/16      Past Surgical History:   Procedure Laterality Date    CARDIAC CATHETERIZATION N/A 3/9/2022    Procedure: CARDIAC RHC W/ PRESSURE SENSOR;  Surgeon: Aminata Wilcox MD;  [facility]: [facility];  Service: Cardiology    CARDIAC CATHETERIZATION N/A 9/6/2022    Procedure: Cardiac catheterization;  Surgeon: Yolanda Del Rosario DO;  Location: [facility];  Service: Cardiology    CARDIAC CATHETERIZATION N/A 9/6/2022    Procedure: Cardiac Coronary Angiogram;  Surgeon: Yolanda Del Rosario DO;  [facility]: [facility];  Service: Cardiology    CARDIAC CATHETERIZATION N/A 10/11/2023    Procedure: Cardiac RHC;  Surgeon: Yoel Darden MD;  [facility]: [facility];  Service: Cardiology    CARPAL TUNNEL RELEASE      bilateral    CHOLECYSTECTOMY LAPAROSCOPIC N/A 2/7/2024    Procedure: CHOLECYSTECTOMY LAPAROSCOPIC;  Surgeon: Nena Ferguson MD;  Location: [facility];  Service: General     GASTRIC BYPASS  2004    with han en y    HERNIA REPAIR      ventral inscisional    IR BIOPSY BONE MARROW  12/14/2023    LAPAROSCOPIC TRANSGASTRIC ACCESS FOR ERCP N/A 2/7/2024    Procedure: LAPAROSCOPIC TRANSGASTRIC ACCESS FOR ERCP;  Surgeon: Nena Ferguson MD;  Location: [facility];  Service: General    LAPAROSCOPIC TRANSGASTRIC ACCESS FOR ERCP N/A 2/7/2024    Procedure: ERCP, sphincterotomy, stone extraction;  Surgeon: Rey Ferguson MD;  Location: [facility];  Service: Gastroenterology    TONSILLECTOMY        Family History   Problem Relation Age of Onset    Stroke Mother     Hypertension Father     Cancer Father     COPD Father       Social History     Tobacco Use    Smoking status: Former     Current packs/day: 1.00     Average packs/day: 1 pack/day for 5.0 years (5.0 ttl pk-yrs)     Types: Pipe, Cigars, Cigarettes     Start date: 2016     Quit date: 1/1/2021     Passive exposure: Never    Smokeless tobacco: Former    Tobacco comments:     cigar once a day   Vaping Use    Vaping status: Never Used   Substance Use Topics    Alcohol use: Yes     Alcohol/week: 2.0 standard drinks of alcohol     Types: 2 Shots of liquor per week     Comment: social    Drug use: No      E-Cigarette/Vaping    E-Cigarette Use Never User       E-Cigarette/Vaping Substances    Nicotine No     THC No     CBD No     Flavoring No     Other No     Unknown No       I have reviewed and agree with the history as documented.     Patient is a 59-year-old male past medical history of CHF preserved for ejection fraction, on Bumex 6 mg 3 times daily at home.  States he has had some weight gain of 15 pounds over the past 2 days.  Normal urine output.  A little bit of shortness of breath but no increased work of breathing.  Noted swelling in his legs and abdomen.  He says he gets abdominal pain every time he develops a CHF exacerbation and this abdominal pain feels the same.  Denies fevers or chills.  No nausea vomiting or diarrhea.  Denies  chest pain.  He has been taking his diuretics at home as prescribed.        Review of Systems   Constitutional:  Negative for chills, fatigue and fever.   Respiratory:  Positive for shortness of breath. Negative for cough.    Cardiovascular:  Positive for leg swelling. Negative for chest pain and palpitations.   Gastrointestinal:  Positive for abdominal pain.   Neurological:  Negative for seizures and syncope.   All other systems reviewed and are negative.          Objective       ED Triage Vitals   Temperature Pulse Blood Pressure Respirations SpO2 Patient Position - Orthostatic VS   04/19/25 2052 04/19/25 1818 04/19/25 1818 04/19/25 1818 04/19/25 1818 04/19/25 1818   97.9 °F (36.6 °C) 87 124/63 20 95 % Sitting      Temp Source Heart Rate Source BP Location FiO2 (%) Pain Score    04/20/25 1107 04/19/25 1818 04/19/25 1818 -- 04/19/25 2047    Oral Monitor Left arm  No Pain      Vitals      Date and Time Temp Pulse SpO2 Resp BP Pain Score FACES Pain Rating User   04/21/25 1431 97.7 °F (36.5 °C) -- -- 16 -- -- -- SS   04/21/25 1431 -- 69 97 % -- 129/71 -- -- DII   04/21/25 1053 97.4 °F (36.3 °C) 72 97 % 15 124/69 -- -- DII   04/21/25 0833 -- -- -- 18 -- -- -- SS   04/21/25 0833 -- 80 96 % -- 123/69 -- -- DII   04/21/25 0800 -- -- -- -- -- No Pain -- KF   04/21/25 0739 98 °F (36.7 °C) -- -- -- -- No Pain -- SS   04/21/25 0739 -- 90 96 % 16 131/82 -- -- DII   04/21/25 0220 -- -- -- 16 -- -- -- AD   04/21/25 0220 97.6 °F (36.4 °C) 71 96 % -- 122/73 -- -- DII   04/20/25 2153 97.9 °F (36.6 °C) -- -- 20 133/75 -- -- DII   04/20/25 2100 -- -- -- -- -- No Pain -- SV   04/20/25 1850 97.2 °F (36.2 °C) 71 97 % 20 105/74 -- -- DII   04/20/25 1507 97.5 °F (36.4 °C) -- -- -- -- -- -- AM   04/20/25 1507 -- 72 98 % 17 144/75 -- -- DII   04/20/25 1107 97.3 °F (36.3 °C) 71 97 % 16 143/73 -- -- DII   04/20/25 0835 -- 82 95 % -- 145/69 -- -- DII   04/20/25 0835 -- 82 -- -- 145/69 -- -- HM   04/20/25 0830 -- -- -- -- -- No Pain --     04/20/25 0633 97.3 °F (36.3 °C) -- -- 17 146/69 -- -- DII   04/19/25 2251 97.8 °F (36.6 °C) 86 97 % 20 143/88 -- -- DII   04/19/25 2100 -- -- -- -- -- No Pain -- SV   04/19/25 2052 97.9 °F (36.6 °C) 82 96 % 17 142/87 -- -- DII   04/19/25 2047 -- -- -- -- -- No Pain -- SV   04/19/25 1818 -- 87 95 % 20 124/63 -- -- SM            Physical Exam  Constitutional:       General: He is not in acute distress.     Appearance: Normal appearance. He is normal weight. He is not ill-appearing, toxic-appearing or diaphoretic.   HENT:      Head: Normocephalic and atraumatic.      Nose: Nose normal.      Mouth/Throat:      Mouth: Mucous membranes are moist.      Pharynx: Oropharynx is clear. No oropharyngeal exudate or posterior oropharyngeal erythema.   Eyes:      General: No scleral icterus.        Right eye: No discharge.         Left eye: No discharge.      Extraocular Movements: Extraocular movements intact.      Conjunctiva/sclera: Conjunctivae normal.      Pupils: Pupils are equal, round, and reactive to light.   Cardiovascular:      Rate and Rhythm: Normal rate and regular rhythm.      Pulses: Normal pulses.      Heart sounds: Normal heart sounds. No murmur heard.     No friction rub. No gallop.   Pulmonary:      Effort: Pulmonary effort is normal. No respiratory distress.      Breath sounds: Normal breath sounds. No stridor. No wheezing, rhonchi or rales.   Chest:      Chest wall: No tenderness.   Abdominal:      General: Bowel sounds are normal. There is distension.      Palpations: Abdomen is soft. There is no mass.      Tenderness: There is no abdominal tenderness. There is no guarding or rebound.      Hernia: No hernia is present.   Musculoskeletal:      Cervical back: Normal range of motion and neck supple. No rigidity.      Right lower leg: Edema present.      Left lower leg: Edema present.      Comments: 3+ edema bilaterally   Lymphadenopathy:      Cervical: No cervical adenopathy.   Skin:     General: Skin is warm  and dry.      Capillary Refill: Capillary refill takes less than 2 seconds.   Neurological:      General: No focal deficit present.      Mental Status: He is alert and oriented to person, place, and time.         Results Reviewed       Procedure Component Value Units Date/Time    Basic metabolic panel [[id number]]  (Abnormal) Collected: 04/20/25 0445    Lab Status: Final result Specimen: Blood from Arm, Right Updated: 04/20/25 0558     Sodium 136 mmol/L      Potassium 3.9 mmol/L      Chloride 96 mmol/L      CO2 29 mmol/L      ANION GAP 11 mmol/L      BUN 25 mg/dL      Creatinine 1.42 mg/dL      Glucose 131 mg/dL      Calcium 7.9 mg/dL      eGFR 54 ml/min/1.73sq m     Narrative:      National [employer] guidelines for Chronic Kidney Disease (CKD):     Stage 1 with normal or high GFR (GFR > 90 mL/min/1.73 square meters)    Stage 2 Mild CKD (GFR = 60-89 mL/min/1.73 square meters)    Stage 3A Moderate CKD (GFR = 45-59 mL/min/1.73 square meters)    Stage 3B Moderate CKD (GFR = 30-44 mL/min/1.73 square meters)    Stage 4 Severe CKD (GFR = 15-29 mL/min/1.73 square meters)    Stage 5 End Stage CKD (GFR <15 mL/min/1.73 square meters)  Note: GFR calculation is accurate only with a steady state creatinine    Magnesium [[id number]]  (Abnormal) Collected: 04/20/25 0445    Lab Status: Final result Specimen: Blood from Arm, Right Updated: 04/20/25 0558     Magnesium 1.8 mg/dL     CBC (With Platelets) [[id number]]  (Abnormal) Collected: 04/20/25 0445    Lab Status: Final result Specimen: Blood from Arm, Right Updated: 04/20/25 0534     WBC 6.47 Thousand/uL      RBC 3.81 Million/uL      Hemoglobin 11.8 g/dL      Hematocrit 36.0 %      MCV 95 fL      MCH 31.0 pg      MCHC 32.8 g/dL      RDW 14.8 %      Platelets 283 Thousands/uL      MPV 10.3 fL     B-Type Natriuretic Peptide(BNP) [[id number]]  (Abnormal) Collected: 04/19/25 1848    Lab Status: Final result Specimen: Blood from Arm, Left Updated: 04/19/25 1925       pg/mL     Basic metabolic panel [[id number]]  (Abnormal) Collected: 04/19/25 1848    Lab Status: Final result Specimen: Blood from Arm, Left Updated: 04/19/25 1919     Sodium 137 mmol/L      Potassium 3.8 mmol/L      Chloride 101 mmol/L      CO2 23 mmol/L      ANION GAP 13 mmol/L      BUN 24 mg/dL      Creatinine 1.45 mg/dL      Glucose 170 mg/dL      Calcium 7.7 mg/dL      eGFR 53 ml/min/1.73sq m     Narrative:      National Kid[employer] Disease Foundation guidelines for Chronic Kidney Disease (CKD):     Stage 1 with normal or high GFR (GFR > 90 mL/min/1.73 square meters)    Stage 2 Mild CKD (GFR = 60-89 mL/min/1.73 square meters)    Stage 3A Moderate CKD (GFR = 45-59 mL/min/1.73 square meters)    Stage 3B Moderate CKD (GFR = 30-44 mL/min/1.73 square meters)    Stage 4 Severe CKD (GFR = 15-29 mL/min/1.73 square meters)    Stage 5 End Stage CKD (GFR <15 mL/min/1.73 square meters)  Note: GFR calculation is accurate only with a steady state creatinine    CBC and differential [[id number]]  (Abnormal) Collected: 04/19/25 1848    Lab Status: Final result Specimen: Blood from Arm, Left Updated: 04/19/25 1900     WBC 7.28 Thousand/uL      RBC 3.88 Million/uL      Hemoglobin 12.0 g/dL      Hematocrit 37.0 %      MCV 95 fL      MCH 30.9 pg      MCHC 32.4 g/dL      RDW 14.8 %      MPV 9.8 fL      Platelets 313 Thousands/uL      nRBC 0 /100 WBCs      Segmented % 66 %      Immature Grans % 1 %      Lymphocytes % 17 %      Monocytes % 8 %      Eosinophils Relative 7 %      Basophils Relative 1 %      Absolute Neutrophils 4.85 Thousands/µL      Absolute Immature Grans 0.07 Thousand/uL      Absolute Lymphocytes 1.24 Thousands/µL      Absolute Monocytes 0.58 Thousand/µL      Eosinophils Absolute 0.47 Thousand/µL      Basophils Absolute 0.07 Thousands/µL             XR chest portable   ED Interpretation by Edmundo Lee MD (04/19 1954)   Pulmonary vascular congestion      Final Interpretation by Carrie Marie MD (04/20 0819)      No  acute cardiopulmonary disease.            Workstation performed: [id number]             Procedures    ED Medication and Procedure Management   Prior to Admission Medications   Prescriptions Last Dose Informant Patient Reported? Taking?   Accu-Chek FastClix Lancets MISC  Self No No   Sig: Test blood sugar twice daily   Blood Glucose Monitoring Suppl (Accu-Chek Guide) w/Device KIT  Self No No   Sig: Use 2 (two) times a day Test blood sugar twice daily   Empagliflozin (JARDIANCE) 10 MG TABS tablet 2025  No Yes   Sig: Take 1 tablet (10 mg total) by mouth daily   acetaminophen (TYLENOL) 325 mg tablet  Self No No   Sig: Take 2 tablets (650 mg total) by mouth every 6 (six) hours as needed for mild pain, headaches or fever   acetaminophen (TYLENOL) 650 mg CR tablet  Self No No   Sig: Take 1 tablet (650 mg total) by mouth every 8 (eight) hours as needed for mild pain   albuterol (PROVENTIL HFA,VENTOLIN HFA) 90 mcg/act inhaler  Self No No   Sig: Inhale 2 puffs every 4 (four) hours as needed for wheezing   apixaban (Eliquis) 5 mg 2025 Self No Yes   Sig: Take 1 tablet (5 mg total) by mouth 2 (two) times a day   atorvastatin (LIPITOR) 10 mg tablet 2025 Self No Yes   Sig: TAKE 1 TABLET BY MOUTH EVERY DAY   budesonide-formoterol (Symbicort) 160-4.5 mcg/act inhaler  Self No No   Sig: Inhale 2 puffs 2 (two) times a day Rinse mouth after use.   bumetanide (BUMEX) 2 mg tablet 2025 Self No Yes   Sig: TAKE 3 TABLETS (6 MG TOTAL) BY MOUTH 3 (THREE) TIMES A DAY   cyanocobalamin (VITAMIN B-12) 1000 MCG tablet 2025 Self No Yes   Sig: Take 1 tablet (1,000 mcg total) by mouth daily   famotidine (PEPCID) 20 mg tablet More than a month Self No No   Sig: Take 1 tablet (20 mg total) by mouth if needed for heartburn As needed twice a day   fluticasone (FLONASE) 50 mcg/act nasal spray Past Month Self No Yes   Si spray into each nostril 2 (two) times a day   loratadine (CLARITIN) 10 mg tablet More than a month Self No No    Sig: Take 1 tablet (10 mg total) by mouth if needed for allergies As needed daily   magnesium Oxide (MAG-OX) 400 mg TABS  Self No No   Sig: Take 1 tablet (400 mg total) by mouth 2 (two) times a day   metolazone (ZAROXOLYN) 2.5 mg tablet 2025 Self No Yes   Sig: Take 2 tablets (5 mg total) by mouth 3 (three) times a week Take 20-30 minutes before Bumex dose and with additional potassium   metoprolol succinate (TOPROL-XL) 50 mg 24 hr tablet  Self No No   Sig: TAKE 1 TABLET BY MOUTH EVERY DAY   montelukast (SINGULAIR) 10 mg tablet  Self No No   Sig: Take 1 tablet (10 mg total) by mouth daily at bedtime   nitroglycerin (NITROSTAT) 0.4 mg SL tablet More than a month Self No No   Sig: Place 1 tablet (0.4 mg total) under the tongue every 5 (five) minutes as needed for chest pain for up to 50 doses   potassium chloride (Klor-Con M20) 20 mEq tablet 2025 Self No Yes   Sig: Take 2 tablets (40 mEq total) by mouth 3 (three) times a day with meals   pregabalin (LYRICA) 50 mg capsule 2025 Self No Yes   Sig: Take 1 caps. at bedtime   sitaGLIPtin (Januvia) 100 mg tablet 2025 Self No Yes   Sig: TAKE 1/2 TABLET BY MOUTH EVERY DAY   sodium chloride (OCEAN) 0.65 % nasal spray More than a month Self No No   Si spray into each nostril every hour as needed for congestion   spironolactone (ALDACTONE) 25 mg tablet 2025 Self No Yes   Sig: Take 1 tablet (25 mg total) by mouth daily   tiotropium (Spiriva Respimat) 1.25 MCG/ACT AERS inhaler  Self No No   Sig: Inhale 2 puffs daily      Facility-Administered Medications: None     Current Discharge Medication List        CONTINUE these medications which have NOT CHANGED    Details   apixaban (Eliquis) 5 mg Take 1 tablet (5 mg total) by mouth 2 (two) times a day  Qty: 180 tablet, Refills: 0    Associated Diagnoses: A-fib ([facility]); Atrial fibrillation, unspecified type ([facility])      atorvastatin (LIPITOR) 10 mg tablet TAKE 1 TABLET BY MOUTH EVERY DAY  Qty: 30 tablet, Refills:  5    Associated Diagnoses: Mixed hyperlipidemia      bumetanide (BUMEX) 2 mg tablet TAKE 3 TABLETS (6 MG TOTAL) BY MOUTH 3 (THREE) TIMES A DAY  Qty: 810 tablet, Refills: 1    Associated Diagnoses: Chronic heart failure with preserved ejection fraction ([facility])      cyanocobalamin (VITAMIN B-12) 1000 MCG tablet Take 1 tablet (1,000 mcg total) by mouth daily  Qty: 30 tablet, Refills: 0    Associated Diagnoses: Vitamin B12 deficiency      Empagliflozin (JARDIANCE) 10 MG TABS tablet Take 1 tablet (10 mg total) by mouth daily  Qty: 30 tablet, Refills: 11    Associated Diagnoses: Chronic heart failure with preserved ejection fraction (HFpEF, >= 50%) ([facility]); Type 2 diabetes mellitus with stage 3b chronic kidney disease, without long-term current use of insulin ([facility])      fluticasone (FLONASE) 50 mcg/act nasal spray 1 spray into each nostril 2 (two) times a day  Refills: 0    Associated Diagnoses: Nasal congestion      metolazone (ZAROXOLYN) 2.5 mg tablet Take 2 tablets (5 mg total) by mouth 3 (three) times a week Take 20-30 minutes before Bumex dose and with additional potassium  Qty: 180 tablet, Refills: 0    Associated Diagnoses: Chronic heart failure with preserved ejection fraction ([facility])      potassium chloride (Klor-Con M20) 20 mEq tablet Take 2 tablets (40 mEq total) by mouth 3 (three) times a day with meals    Associated Diagnoses: Acute on chronic congestive heart failure, unspecified heart failure type ([facility])      pregabalin (LYRICA) 50 mg capsule Take 1 caps. at bedtime  Qty: 90 capsule, Refills: 1    Associated Diagnoses: Diabetic polyneuropathy associated with type 2 diabetes mellitus ([facility])      sitaGLIPtin (Januvia) 100 mg tablet TAKE 1/2 TABLET BY MOUTH EVERY DAY  Qty: 15 tablet, Refills: 5    Associated Diagnoses: Type 2 diabetes mellitus with stage 3b chronic kidney disease, without long-term current use of insulin ([facility])      spironolactone (ALDACTONE) 25 mg tablet Take 1 tablet (25 mg total) by mouth  daily  Qty: 30 tablet, Refills: 6    Associated Diagnoses: Acute on chronic diastolic congestive heart failure ([facility])      Accu-Chek FastClix Lancets MISC Test blood sugar twice daily  Qty: 200 each, Refills: 3    Associated Diagnoses: Type 2 diabetes mellitus with hyperglycemia, without long-term current use of insulin ([facility])      acetaminophen (TYLENOL) 325 mg tablet Take 2 tablets (650 mg total) by mouth every 6 (six) hours as needed for mild pain, headaches or fever    Associated Diagnoses: Acute gallstone pancreatitis; Choledocholithiasis      acetaminophen (TYLENOL) 650 mg CR tablet Take 1 tablet (650 mg total) by mouth every 8 (eight) hours as needed for mild pain  Qty: 30 tablet, Refills: 0    Associated Diagnoses: Knee laceration      albuterol (PROVENTIL HFA,VENTOLIN HFA) 90 mcg/act inhaler Inhale 2 puffs every 4 (four) hours as needed for wheezing  Qty: 18 g, Refills: 0    Comments: Substitution to a formulary equivalent within the same pharmaceutical class is authorized.  Associated Diagnoses: Mild intermittent asthma without complication      Blood Glucose Monitoring Suppl (Accu-Chek Guide) w/Device KIT Use 2 (two) times a day Test blood sugar twice daily  Qty: 1 kit, Refills: 0    Associated Diagnoses: Type 2 diabetes mellitus with hyperglycemia, without long-term current use of insulin ([facility])      budesonide-formoterol (Symbicort) 160-4.5 mcg/act inhaler Inhale 2 puffs 2 (two) times a day Rinse mouth after use.  Qty: 30.6 g, Refills: 2    Associated Diagnoses: Moderate persistent asthma with acute exacerbation      famotidine (PEPCID) 20 mg tablet Take 1 tablet (20 mg total) by mouth if needed for heartburn As needed twice a day    Associated Diagnoses: Chest pain      loratadine (CLARITIN) 10 mg tablet Take 1 tablet (10 mg total) by mouth if needed for allergies As needed daily    Associated Diagnoses: Nasal congestion      magnesium Oxide (MAG-OX) 400 mg TABS Take 1 tablet (400 mg total) by mouth 2  (two) times a day  Qty: 60 tablet, Refills: 0    Associated Diagnoses: Acute on chronic congestive heart failure, unspecified heart failure type (HCC)      metoprolol succinate (TOPROL-XL) 50 mg 24 hr tablet TAKE 1 TABLET BY MOUTH EVERY DAY  Qty: 90 tablet, Refills: 1    Associated Diagnoses: Chronic heart failure with preserved ejection fraction (HCC)      montelukast (SINGULAIR) 10 mg tablet Take 1 tablet (10 mg total) by mouth daily at bedtime  Qty: 90 tablet, Refills: 1    Associated Diagnoses: Moderate persistent asthma with acute exacerbation      nitroglycerin (NITROSTAT) 0.4 mg SL tablet Place 1 tablet (0.4 mg total) under the tongue every 5 (five) minutes as needed for chest pain for up to 50 doses  Qty: 50 tablet, Refills: 0    Associated Diagnoses: Chest pain      sodium chloride (OCEAN) 0.65 % nasal spray 1 spray into each nostril every hour as needed for congestion  Qty: 37.5 mL, Refills: 0    Associated Diagnoses: Acute on chronic diastolic congestive heart failure (HCC)      tiotropium (Spiriva Respimat) 1.25 MCG/ACT AERS inhaler Inhale 2 puffs daily  Qty: 4 g, Refills: 5    Associated Diagnoses: Moderate persistent asthma with acute exacerbation           No discharge procedures on file.  ED SEPSIS DOCUMENTATION   Time reflects when diagnosis was documented in both MDM as applicable and the Disposition within this note       Time User Action Codes Description Comment    4/19/2025  7:21 PM Edmundo Lee [I50.9] CHF exacerbation (HCC)     4/21/2025  3:34 PM Akiko Lowery [B35.1] Onychomycosis                  Edmundo Lee MD  04/21/25 5247

## 2025-04-21 NOTE — RESTORATIVE TECHNICIAN NOTE
Restorative Technician Note      Patient Name: Mesfin Cano     Restorative Tech Visit Date: 04/21/25  Note Type: Mobility  Patient Position Upon Consult: Bedside chair  Activity Performed: Other (Comment) (PT refused ambulation at this time, said he would walk later at night and let his nurse know.)  Patient Position at End of Consult: Bedside chair; All needs within reach    Adrianna Taveras, Restorative Tech

## 2025-04-21 NOTE — PROGRESS NOTES
Patient listed on Advanced Heart Failure Census at [facility].     Outpatient Advanced Heart Failure LCSW completed electronic chart review to prepare for rounds with the Heart Failure Team. Patient informed Team that the only thing he did differently with diet at home was eat salmon which his wife prepared without marinade and no added salt. Dr. Wilcox discussed Today's Plan. Continue to diurese.     Outpatient Social Work needs have not been identified at this time. Please enter referral if needed in the future.

## 2025-04-21 NOTE — ED ATTENDING ATTESTATION
4/19/2025  I, Miki Sullivan MD, saw and evaluated the patient. I have discussed the patient with the resident/non-physician practitioner and agree with the resident's/non-physician practitioner's findings, Plan of Care, and MDM as documented in the resident's/non-physician practitioner's note, except where noted. All available labs and Radiology studies were reviewed.  I was present for key portions of any procedure(s) performed by the resident/non-physician practitioner and I was immediately available to provide assistance.       At this point I agree with the current assessment done in the Emergency Department.  I have conducted an independent evaluation of this patient a history and physical is as follows:    ED Course     Impression: Shortness of breath, peripheral edema  Differential diagnosis: Acute exacerbation of CHF, volume overload, pulmonary edema, peripheral edema,    Plan check labs, ECG, cardiac enzymes, BNP, chest x-ray    Examination consistent with anasarca/volume overload start patient on IV diuresis.        Critical Care Time  Procedures

## 2025-04-21 NOTE — ASSESSMENT & PLAN NOTE
Patient well-known to the facility with recurrent admissions for CHF  History of preserved EF  Last hospitalized 3/11 3/20  Presents with 15 pound weight gain over the last 48 hours, increasing dyspnea on exertion, and lower extremity edema  patient reports compliance with home meds, fluid restriction, and sodium restriction.    Started on Bumex drip one 1 mg/h which we will continue  Monitor on telemetry  Monitor intake/output, and daily weights  Monitor electrolytes closely with diuresis; Daily BMP  GDMT: Continue home Jardiance, spironolactone, metoprolol  Heart failure consultation appreciated  Adding Zaroxolyn  Patient is still 10 pounds above his baseline

## 2025-04-21 NOTE — PROGRESS NOTES
Progress Note - Hospitalist   Name: Mesfin Cano 59 y.o. male I MRN: [id number]  Unit/Bed#: [facility] 520-01 I Date of Admission: 4/19/2025   Date of Service: 4/21/2025 I Hospital Day: 2    Assessment & Plan  Acute on chronic diastolic CHF (congestive heart failure) (HCC)  Patient well-known to the facility with recurrent admissions for CHF  History of preserved EF  Last hospitalized 3/11 3/20  Presents with 15 pound weight gain over the last 48 hours, increasing dyspnea on exertion, and lower extremity edema  patient reports compliance with home meds, fluid restriction, and sodium restriction.    Started on Bumex drip one 1 mg/h which we will continue  Monitor on telemetry  Monitor intake/output, and daily weights  Monitor electrolytes closely with diuresis; Daily BMP  GDMT: Continue home Jardiance, spironolactone, metoprolol  Heart failure consultation appreciated  Adding Zaroxolyn  Patient is still 10 pounds above his baseline  Stage 3 chronic kidney disease (HCC)  Creatinine 1.45 on admission; below baseline  Monitor daily BMP with diuresis  Creatinine today is 1.42  Primary hypertension  Monitor on home metoprolol and with diuresis  Morbid obesity (HCC)  BMI 42; affects all levels of care, diet and lifestyle modifications recommended  Paroxysmal atrial fibrillation (HCC)  Continue metoprolol succinate for rate control  Continue home Eliquis for stroke prophylaxis  Monitor on telemetry  [facility] [place] HF system  Heart failure consultation  Type 2 diabetes mellitus without complication, without long-term current use of insulin (HCC)  Well-controlled based on last hemoglobin A1c  Monitor on sliding scale  Hypoglycemia protocol  Consistent carb diet      VTE Pharmacologic Prophylaxis: VTE Score: 4 Moderate Risk (Score 3-4) - Pharmacological DVT Prophylaxis Ordered: apixaban (Eliquis).    Mobility:   Basic Mobility Inpatient Raw Score: 24  JH-HLM Goal: 8: Walk 250 feet or more  JH-HLM Achieved: 6: Walk 10 steps  or more  JH-HLM Goal achieved. Continue to encourage appropriate mobility.    Patient Centered Rounds: I performed bedside rounds with nursing staff today.   Discussions with Specialists or Other Care Team Provider:     Education and Discussions with Family / Patient: Patient declined to contact family  Current Length of Stay: 2 day(s)  Current Patient Status: Inpatient   Certification Statement: The patient will continue to require additional inpatient hospital stay due to diuresis  Discharge Plan: Anticipate discharge in 48-72 hrs to home.    Code Status: Level 1 - Full Code    Subjective   Patient in and examined.  Reported that he is feeling better.  Shortness of breath is improving.  Patient is reporting that the lower extremity edema is also improving    Objective :  Temp:  [97.2 °F (36.2 °C)-98 °F (36.7 °C)] 97.4 °F (36.3 °C)  HR:  [71-90] 72  BP: (105-144)/(69-82) 124/69  Resp:  [15-20] 15  SpO2:  [96 %-98 %] 97 %  O2 Device: None (Room air)    Body mass index is 42.38 kg/m².     Input and Output Summary (last 24 hours):     Intake/Output Summary (Last 24 hours) at 4/21/2025 1431  Last data filed at 4/21/2025 1148  Gross per 24 hour   Intake 1036.2 ml   Output 6050 ml   Net -5013.8 ml       Physical Exam  Constitutional:       General: He is not in acute distress.     Appearance: He is obese. He is not ill-appearing.   HENT:      Head: Normocephalic.   Eyes:      General: No scleral icterus.  Cardiovascular:      Rate and Rhythm: Normal rate and regular rhythm.      Heart sounds: No murmur heard.  Pulmonary:      Effort: No respiratory distress.      Breath sounds: No wheezing.   Abdominal:      General: There is distension.      Tenderness: There is no abdominal tenderness. There is no guarding.   Musculoskeletal:         General: No swelling. Normal range of motion.      Right lower leg: Edema present.      Left lower leg: Edema present.   Skin:     Coloration: Skin is not jaundiced.      Findings: No  bruising.   Neurological:      Mental Status: He is alert and oriented to person, place, and time.      Cranial Nerves: No cranial nerve deficit.           Lines/Drains:        Telemetry:  Telemetry Orders (From admission, onward)               24 Hour Telemetry Monitoring  Continuous x 24 Hours (Telem)        Expiring   Question:  Reason for 24 Hour Telemetry  Answer:  Decompensated CHF- and any one of the following: continuous diuretic infusion or total diuretic dose >200 mg daily, associated electrolyte derangement (I.e. K < 3.0), inotropic drip (continuous infusion), hx of ventricular arrhythmia, or new EF < 35%                     Telemetry Reviewed: Normal Sinus Rhythm  Indication for Continued Telemetry Use: Acute CHF on >200 mg lasix/day or equivalent dose or with new reduced EF.                Lab Results: I have reviewed the following results:   Results from last 7 days   Lab Units 04/21/25  0437 04/20/25  0445 04/19/25  1848   WBC Thousand/uL 6.57   < > 7.28   HEMOGLOBIN g/dL 11.9*   < > 12.0   HEMATOCRIT % 37.3   < > 37.0   PLATELETS Thousands/uL 270   < > 313   SEGS PCT %  --   --  66   LYMPHO PCT %  --   --  17   MONO PCT %  --   --  8   EOS PCT %  --   --  7*    < > = values in this interval not displayed.     Results from last 7 days   Lab Units 04/21/25  0905   SODIUM mmol/L 134*   POTASSIUM mmol/L 3.8   CHLORIDE mmol/L 92*   CO2 mmol/L 32   BUN mg/dL 26*   CREATININE mg/dL 1.69*   ANION GAP mmol/L 10   CALCIUM mg/dL 8.3*   GLUCOSE RANDOM mg/dL 179*         Results from last 7 days   Lab Units 04/21/25  1051 04/21/25  0615 04/20/25  2050 04/20/25  1551 04/20/25  1105 04/20/25  0605 04/19/25  2100   POC GLUCOSE mg/dl 184* 177* 198* 144* 160* 153* 171*               Recent Cultures (last 7 days):               Last 24 Hours Medication List:     Current Facility-Administered Medications:     acetaminophen (TYLENOL) tablet 650 mg, Q6H PRN    albuterol (PROVENTIL HFA,VENTOLIN HFA) inhaler 2 puff, Q4H  PRN    apixaban (ELIQUIS) tablet 5 mg, BID    atorvastatin (LIPITOR) tablet 10 mg, Daily    budesonide-formoterol (SYMBICORT) 160-4.5 mcg/act inhaler 2 puff, BID    bumetanide (BUMEX) 12.5 mg infusion 50 mL, Continuous, Last Rate: 1 mg/hr (04/21/25 1105)    cyanocobalamin (VITAMIN B-12) tablet 1,000 mcg, Daily    Empagliflozin (JARDIANCE) tablet 10 mg, Daily    insulin lispro (HumALOG/ADMELOG) 100 units/mL subcutaneous injection 1-6 Units, HS    insulin lispro (HumALOG/ADMELOG) 100 units/mL subcutaneous injection 2-12 Units, TID AC **AND** Fingerstick Glucose (POCT), TID AC    magnesium Oxide (MAG-OX) tablet 400 mg, BID    metolazone (ZAROXOLYN) tablet 5 mg, Once    metoprolol succinate (TOPROL-XL) 24 hr tablet 50 mg, Daily    ondansetron (ZOFRAN) injection 4 mg, Q6H PRN    potassium chloride (Klor-Con M20) CR tablet 40 mEq, TID With Meals    pregabalin (LYRICA) capsule 50 mg, HS    spironolactone (ALDACTONE) tablet 25 mg, Daily    umeclidinium 62.5 mcg/actuation inhaler AEPB 1 puff, Daily    Administrative Statements   Today, Patient Was Seen By: Akiko Lowery MD      **Please Note: This note may have been constructed using a voice recognition system.**

## 2025-04-21 NOTE — CONSULTS
Podiatry - Consultation    Patient Information:   Mesfin Cano 59 y.o. male MRN: [id number]  Unit/Bed#: [facility] 520-01 Encounter: [id number]  PCP: RODRIGUE Hodge  Date of Admission:  4/19/2025  Date of Consultation: 04/21/25  Requesting Physician: Akiko Lowery MD      ASSESSMENT:    Mesfin Cano is a 59 y.o. male with:    Onychomycosis   Type II diabetes mellitus A1c 6.3%     PLAN:    Patient seen at bedside. No acute pedal concerns at this time.    Elongated toenails x10 were excisionally debrided. See procedure note below.   Outpatient follow up in chart.   Rest of Medical care per primary team.  Podiatry signing off at this time, please re-consult with any questions or concerns as needed.    Procedure Note: Nail Debridement   After verbal consent was obtained, patient's elongated nails x10 were sharply debrided to near normal length and thickness using a large nail nipper. The patient tolerated this well and without incident.     Weight bearing Status: WBAT     SUBJECTIVE:    History of Present Illness:    Mesfin Cano is a 59 y.o. male who is originally admitted 4/19/2025 due to CHF with a past medical history of  chronic diastolic CHF, CKD 3, hyperlipidemia, hypertension.    We are consulted for painful, elongated toenails x 10. Patient states that they have difficulty applying their socks and shoes due to the elongation of the nails. They report pain with palpation and pressure within their shoe gear. They have been unable to cut their nails adequately. Patient has no further pedal complaints at this time.     Patient denies nausea, vomiting, fever, chills, shortness of breath, chest pain.     Review of Systems:    Constitutional: Negative.    HENT: Negative.    Eyes: Negative.    Respiratory: Negative.    Cardiovascular: Negative.    Gastrointestinal: Negative.    Musculoskeletal: Right great toe pain    Skin: Elongated nails    Neurological: Negative    Psych: Negative.     Past Medical and Surgical  History:     Past Medical History:   Diagnosis Date    Abnormal stress test 06/26/2024    Acute gastritis without hemorrhage     last assessed 3/24/17    Acute on chronic diastolic heart failure (HCC) 01/04/2025    Asthma     Atrial fibrillation (HCC)     Bilateral leg edema 02/19/2020    CHF (congestive heart failure) (HCC)     CHF, acute on chronic (HCC) 03/12/2025    Coronary artery disease     Daytime sleepiness 07/17/2019    Diabetes mellitus (HCC)     Dyspnea on exertion 10/11/2021    Edema of both feet 01/23/2020    Electrolyte abnormality 10/05/2024    Gastric bypass status for obesity     HNP (herniated nucleus pulposus), lumbar 02/23/2021    Hyperlipidemia     Hypertension     Hypokalemia 11/14/2021    Impaired fasting glucose     last assessed 5/10/17    Knee sprain, bilateral 06/14/2018    Leg cramps 06/16/2022    Obesity     Status post cholecystectomy 02/17/2024    Uncontrolled atrial flutter (HCC) 06/18/2024    Viral gastroenteritis     last assessed 11/4/16       Past Surgical History:   Procedure Laterality Date    CARDIAC CATHETERIZATION N/A 3/9/2022    Procedure: CARDIAC RHC W/ PRESSURE SENSOR;  Surgeon: Aminata Wilcox MD;  Location: [facility];  Service: Cardiology    CARDIAC CATHETERIZATION N/A 9/6/2022    Procedure: Cardiac catheterization;  Surgeon: Yolanda Del Rosario DO;  Location: [facility];  Service: Cardiology    CARDIAC CATHETERIZATION N/A 9/6/2022    Procedure: Cardiac Coronary Angiogram;  Surgeon: Yolanda Del Rosario DO;  Location: [facility];  Service: Cardiology    CARDIAC CATHETERIZATION N/A 10/11/2023    Procedure: Cardiac RHC;  Surgeon: Yoel Darden MD;  Location: [facility];  Service: Cardiology    CARPAL TUNNEL RELEASE      bilateral    CHOLECYSTECTOMY LAPAROSCOPIC N/A 2/7/2024    Procedure: CHOLECYSTECTOMY LAPAROSCOPIC;  Surgeon: Nena Ferguson MD;  Location: [facility];  Service: General    GASTRIC BYPASS  2004    with han en y     HERNIA REPAIR      ventral inscisional    IR BIOPSY BONE MARROW  12/14/2023    LAPAROSCOPIC TRANSGASTRIC ACCESS FOR ERCP N/A 2/7/2024    Procedure: LAPAROSCOPIC TRANSGASTRIC ACCESS FOR ERCP;  Surgeon: Nena Ferguson MD;  Location: [facility];  Service: General    LAPAROSCOPIC TRANSGASTRIC ACCESS FOR ERCP N/A 2/7/2024    Procedure: ERCP, sphincterotomy, stone extraction;  Surgeon: Rey Ferguson MD;  Location: [facility];  Service: Gastroenterology    TONSILLECTOMY         Meds/Allergies:      Medications Prior to Admission:     apixaban (Eliquis) 5 mg    atorvastatin (LIPITOR) 10 mg tablet    bumetanide (BUMEX) 2 mg tablet    cyanocobalamin (VITAMIN B-12) 1000 MCG tablet    Empagliflozin (JARDIANCE) 10 MG TABS tablet    fluticasone (FLONASE) 50 mcg/act nasal spray    metolazone (ZAROXOLYN) 2.5 mg tablet    potassium chloride (Klor-Con M20) 20 mEq tablet    pregabalin (LYRICA) 50 mg capsule    sitaGLIPtin (Januvia) 100 mg tablet    spironolactone (ALDACTONE) 25 mg tablet    Accu-Chek FastClix Lancets MISC    acetaminophen (TYLENOL) 325 mg tablet    acetaminophen (TYLENOL) 650 mg CR tablet    albuterol (PROVENTIL HFA,VENTOLIN HFA) 90 mcg/act inhaler    Blood Glucose Monitoring Suppl (Accu-Chek Guide) w/Device KIT    budesonide-formoterol (Symbicort) 160-4.5 mcg/act inhaler    famotidine (PEPCID) 20 mg tablet    loratadine (CLARITIN) 10 mg tablet    magnesium Oxide (MAG-OX) 400 mg TABS    metoprolol succinate (TOPROL-XL) 50 mg 24 hr tablet    montelukast (SINGULAIR) 10 mg tablet    nitroglycerin (NITROSTAT) 0.4 mg SL tablet    sodium chloride (OCEAN) 0.65 % nasal spray    tiotropium (Spiriva Respimat) 1.25 MCG/ACT AERS inhaler    Allergies   Allergen Reactions    Azithromycin Other (See Comments)     Shaky, uneasy feeling     Bactrim [Sulfamethoxazole-Trimethoprim] Other (See Comments)     shakiness       Social History:     Marital Status: Single    Substance Use History:   Social History     Substance and Sexual  "Activity   Alcohol Use Yes    Alcohol/week: 2.0 standard drinks of alcohol    Types: 2 Shots of liquor per week    Comment: social     Social History     Tobacco Use   Smoking Status Former    Current packs/day: 1.00    Average packs/day: 1 pack/day for 5.0 years (5.0 ttl pk-yrs)    Types: Pipe, Cigars, Cigarettes    Start date: 2016    Quit date: 1/1/2021    Passive exposure: Never   Smokeless Tobacco Former   Tobacco Comments    cigar once a day     Social History     Substance and Sexual Activity   Drug Use No       Family History:    Family History   Problem Relation Age of Onset    Stroke Mother     Hypertension Father     Cancer Father     COPD Father          OBJECTIVE:    Vitals:   Blood Pressure: 129/71 (04/21/25 1431)  Pulse: 69 (04/21/25 1431)  Temperature: 97.7 °F (36.5 °C) (04/21/25 1431)  Temp Source: Oral (04/21/25 1431)  Respirations: 16 (04/21/25 1431)  Height: 6' 1\" (185.4 cm) (04/19/25 2047)  Weight - Scale: (!) 146 kg (321 lb 3.4 oz) (04/21/25 0555)  SpO2: 97 % (04/21/25 1431)    Physical Exam:    General Appearance: Alert, cooperative, no distress.  HEENT: Head normocephalic, atraumatic, without obvious abnormality.  Heart: Normal rate and rhythm.  Lungs: Non-labored breathing. No respiratory distress.  Abdomen: Without distension.  Psychiatric: AAOx3  Lower Extremity:    Vascular:   DP: Right: 2+   Left: 2+  PT: Right: 2+   Left: 2+  CRT < 3 seconds at the digits.   +1/4 edema noted at bilateral lower extremities.   Pedal hair is absent.   Skin temperature is WNL bilaterally.    Musculoskeletal:  MMT is 5/5 in all muscle compartments bilaterally.   ROM at the 1st MPJ and ankle joint are reduced bilaterally with the leg extended.   Pain on palpation of right great toe nail.   No gross deformities noted.     Dermatological:  Elongated, dystrophic, thickened toenails x10 that are tender to palpation with notable subungual debris.    Neurological:  Gross sensation is diminished.   Light touch is " "diminished.   Protective sensation is diminished.    Additional data:     Lab Results: I have personally reviewed pertinent labs including:    Results from last 7 days   Lab Units 04/21/25  0437 04/20/25  0445 04/19/25  1848   WBC Thousand/uL 6.57   < > 7.28   HEMOGLOBIN g/dL 11.9*   < > 12.0   HEMATOCRIT % 37.3   < > 37.0   PLATELETS Thousands/uL 270   < > 313   SEGS PCT %  --   --  66   LYMPHO PCT %  --   --  17   MONO PCT %  --   --  8   EOS PCT %  --   --  7*    < > = values in this interval not displayed.     Results from last 7 days   Lab Units 04/21/25  0905   POTASSIUM mmol/L 3.8   CHLORIDE mmol/L 92*   CO2 mmol/L 32   BUN mg/dL 26*   CREATININE mg/dL 1.69*   CALCIUM mg/dL 8.3*           Cultures: I have personally reviewed pertinent cultures including:              Imaging: I have personally reviewed pertinent reports in PACS.  EKG, Pathology, and Other Studies: I have personally reviewed pertinent reports.    Time Spent for Care: 30 minutes.  More than 50% of total time spent on counseling and coordination of care as described above.      ** Please Note: Portions of the record may have been created with voice recognition software. Occasional wrong word or \"sound a like\" substitutions may have occurred due to the inherent limitations of voice recognition software. Read the chart carefully and recognize, using context, where substitutions have occurred. **    "

## 2025-04-22 LAB
ANION GAP SERPL CALCULATED.3IONS-SCNC: 12 MMOL/L (ref 4–13)
BUN SERPL-MCNC: 31 MG/DL (ref 5–25)
CALCIUM SERPL-MCNC: 8.7 MG/DL (ref 8.4–10.2)
CHLORIDE SERPL-SCNC: 86 MMOL/L (ref 96–108)
CO2 SERPL-SCNC: 34 MMOL/L (ref 21–32)
CREAT SERPL-MCNC: 1.88 MG/DL (ref 0.6–1.3)
GFR SERPL CREATININE-BSD FRML MDRD: 38 ML/MIN/1.73SQ M
GLUCOSE SERPL-MCNC: 185 MG/DL (ref 65–140)
GLUCOSE SERPL-MCNC: 197 MG/DL (ref 65–140)
GLUCOSE SERPL-MCNC: 211 MG/DL (ref 65–140)
GLUCOSE SERPL-MCNC: 246 MG/DL (ref 65–140)
GLUCOSE SERPL-MCNC: 256 MG/DL (ref 65–140)
POTASSIUM SERPL-SCNC: 3.4 MMOL/L (ref 3.5–5.3)
SODIUM SERPL-SCNC: 132 MMOL/L (ref 135–147)

## 2025-04-22 PROCEDURE — 99232 SBSQ HOSP IP/OBS MODERATE 35: CPT | Performed by: INTERNAL MEDICINE

## 2025-04-22 PROCEDURE — 82948 REAGENT STRIP/BLOOD GLUCOSE: CPT

## 2025-04-22 PROCEDURE — 80048 BASIC METABOLIC PNL TOTAL CA: CPT

## 2025-04-22 RX ORDER — BUMETANIDE 2 MG/1
6 TABLET ORAL
Status: DISCONTINUED | OUTPATIENT
Start: 2025-04-23 | End: 2025-04-27 | Stop reason: HOSPADM

## 2025-04-22 RX ORDER — POTASSIUM CHLORIDE 1500 MG/1
20 TABLET, EXTENDED RELEASE ORAL ONCE
Status: COMPLETED | OUTPATIENT
Start: 2025-04-22 | End: 2025-04-22

## 2025-04-22 RX ORDER — LORATADINE 10 MG/1
10 TABLET ORAL DAILY PRN
Status: DISCONTINUED | OUTPATIENT
Start: 2025-04-22 | End: 2025-04-27 | Stop reason: HOSPADM

## 2025-04-22 RX ORDER — BUMETANIDE 2 MG/1
6 TABLET ORAL ONCE
Status: COMPLETED | OUTPATIENT
Start: 2025-04-22 | End: 2025-04-22

## 2025-04-22 RX ADMIN — POTASSIUM CHLORIDE 40 MEQ: 1500 TABLET, EXTENDED RELEASE ORAL at 16:48

## 2025-04-22 RX ADMIN — UMECLIDINIUM 1 PUFF: 62.5 AEROSOL, POWDER ORAL at 08:20

## 2025-04-22 RX ADMIN — APIXABAN 5 MG: 5 TABLET, FILM COATED ORAL at 16:53

## 2025-04-22 RX ADMIN — Medication 1 MG/HR: at 06:41

## 2025-04-22 RX ADMIN — POTASSIUM CHLORIDE 20 MEQ: 1500 TABLET, EXTENDED RELEASE ORAL at 10:17

## 2025-04-22 RX ADMIN — LORATADINE 10 MG: 10 TABLET ORAL at 21:39

## 2025-04-22 RX ADMIN — APIXABAN 5 MG: 5 TABLET, FILM COATED ORAL at 08:20

## 2025-04-22 RX ADMIN — ATORVASTATIN CALCIUM 10 MG: 10 TABLET, FILM COATED ORAL at 08:20

## 2025-04-22 RX ADMIN — MAGNESIUM OXIDE TAB 400 MG (241.3 MG ELEMENTAL MG) 400 MG: 400 (241.3 MG) TAB at 16:56

## 2025-04-22 RX ADMIN — INSULIN LISPRO 2 UNITS: 100 INJECTION, SOLUTION INTRAVENOUS; SUBCUTANEOUS at 16:48

## 2025-04-22 RX ADMIN — MAGNESIUM OXIDE TAB 400 MG (241.3 MG ELEMENTAL MG) 400 MG: 400 (241.3 MG) TAB at 08:20

## 2025-04-22 RX ADMIN — SPIRONOLACTONE 25 MG: 25 TABLET, FILM COATED ORAL at 08:20

## 2025-04-22 RX ADMIN — INSULIN LISPRO 3 UNITS: 100 INJECTION, SOLUTION INTRAVENOUS; SUBCUTANEOUS at 21:24

## 2025-04-22 RX ADMIN — CYANOCOBALAMIN TAB 500 MCG 1000 MCG: 500 TAB at 08:20

## 2025-04-22 RX ADMIN — PREGABALIN 50 MG: 50 CAPSULE ORAL at 21:24

## 2025-04-22 RX ADMIN — BUDESONIDE AND FORMOTEROL FUMARATE DIHYDRATE 2 PUFF: 160; 4.5 AEROSOL RESPIRATORY (INHALATION) at 16:54

## 2025-04-22 RX ADMIN — BUDESONIDE AND FORMOTEROL FUMARATE DIHYDRATE 2 PUFF: 160; 4.5 AEROSOL RESPIRATORY (INHALATION) at 08:20

## 2025-04-22 RX ADMIN — Medication 1 MG/HR: at 13:34

## 2025-04-22 RX ADMIN — INSULIN LISPRO 2 UNITS: 100 INJECTION, SOLUTION INTRAVENOUS; SUBCUTANEOUS at 06:39

## 2025-04-22 RX ADMIN — METOPROLOL SUCCINATE 50 MG: 50 TABLET, EXTENDED RELEASE ORAL at 08:20

## 2025-04-22 RX ADMIN — POTASSIUM CHLORIDE 40 MEQ: 1500 TABLET, EXTENDED RELEASE ORAL at 11:14

## 2025-04-22 RX ADMIN — POTASSIUM CHLORIDE 40 MEQ: 1500 TABLET, EXTENDED RELEASE ORAL at 08:20

## 2025-04-22 RX ADMIN — INSULIN LISPRO 2 UNITS: 100 INJECTION, SOLUTION INTRAVENOUS; SUBCUTANEOUS at 11:15

## 2025-04-22 RX ADMIN — BUMETANIDE 6 MG: 2 TABLET ORAL at 16:55

## 2025-04-22 RX ADMIN — EMPAGLIFLOZIN 10 MG: 10 TABLET, FILM COATED ORAL at 08:22

## 2025-04-22 NOTE — PROGRESS NOTES
Advanced Heart Failure / Pulmonary Hypertension Service Progress Note    Mesfin Cano 59 y.o. male   MRN: [id number]  Unit/Bed#: [facility] 520-01; Encounter: [id number]    Assessment:  Principal Problem:    Acute on chronic diastolic CHF (congestive heart failure) (HCC)  Active Problems:    Primary hypertension    Morbid obesity (HCC)    Paroxysmal atrial fibrillation (HCC)    Stage 3 chronic kidney disease (HCC)    Presence of CardioMEMS HF system    Type 2 diabetes mellitus without complication, without long-term current use of insulin (HCC)    Mesfin Cano is a 59-year-old man with PMH as below who presented to [facility] on 04/19/2025 (7th admission in past 12 months) with reported 15 lbs weight gain in past 48 hours. Reported feeling well on 04/18; weight on home scale of 314 lbs. Then on 04/19, noted weight of 327 lbs in the afternoon with associated TELLES and bilateral LE swelling.  Denies lightheadedness, chest pain, wheezing, SOB at rest, and orthopnea.  Reports less or urine output with diuretics over past few days.     Subjective:   Patient seen and examined. Pt reports improved HF symptoms including TELLES and weight loss.      Objective:   Intake/ Output: 1466/ 6875 (-5.4)  Weight: admit 323-->321--313 lbs (-7 lbs, )   Telemetry: AFib HR 80-90s    Today's Plan:  Stop Bumex gtt.  Volume status improved with weight nearing BL and TELLES improving.  JVD down. Will give PO Bumex 6 mg tonight x 1 dose and start home regimen tomorrow Bumex 6 mg TID with dose of Metolazone 7.5 mg.    Continue Toprol XL 50 Qd, Jardiance 10 Qd, spironolactone 25 QD.  BPs stable.  Creat w/in BL  Potential to DC home Thursday after monitoring on PO diuretics for 24 hrs  Encourage pt to ambulate in hallways  Repeat BMP in a.m.      Plan:  Acute on chronic HFpEF; LVEF 55%              Impression: unclear etiology of current exacerbation. ?increased Afib burden.  RHC / MEMS calibration 10/11/2023: CardioMEMS data correlates to  "[facility].      Guideline Directed Medical Therapy:  --Aldosterone Antagonist: spironolactone 25 mg daily.   --SGLT2 Inhibitor: empagliflozin 10 mg daily.      Volume Management:  --Home Diuretic: Bumex 6 mg TID with metolazone 5 mg TTSa.   --Inpatient Diuretic: IV Bumex 1 mg/hr.   --K supplementation: potassium 40 mEq TID.                  Advanced Therapies:              --CardioMEMS: implanted 03/09/2022. Goal PAd of 12 mmHg.     Atrial fibrillation, parosxysmal              [id number] = 3 (HF, HTN, DM).              Anticoagulation on Eliquis.               Rate control: metoprolol succinate 50 mg daily.              Rhythm control: No.     Chronic kidney disease, stage IIIa (1.5-2.0)  Hypertension  Hyperlipidemia  Asthma, severe persistent: follows with Dr. Noonan as outpatient.   Obstructive sleep apnea  Diabetes mellitus, type II  History of gastric bypass (Samuel-en-Y) surgery   History of tobacco abuse      Vitals:   Blood pressure 131/79, pulse 89, temperature 97.9 °F (36.6 °C), temperature source Oral, resp. rate 16, height 6' 1\" (1.854 m), weight (!) 142 kg (313 lb 0.9 oz), SpO2 92%.    Body mass index is 41.3 kg/m².    I/O last 3 completed shifts:  In: 1706.7 [P.O.:1560; I.V.:146.7]  Out: 9375 [Urine:9375]    Wt Readings from Last 10 Encounters:   04/22/25 (!) 142 kg (313 lb 0.9 oz)   04/14/25 (!) 145 kg (319 lb 3.2 oz)   04/09/25 (!) 145 kg (319 lb 6.4 oz)   03/26/25 (!) 147 kg (325 lb)   03/20/25 (!) 141 kg (309 lb 15.5 oz)   03/10/25 (!) 151 kg (333 lb 3.2 oz)   01/31/25 (!) 143 kg (315 lb)   01/09/25 (!) 143 kg (315 lb 0.6 oz)   12/04/24 (!) 147 kg (323 lb 6.4 oz)   11/28/24 (!) 143 kg (314 lb 9.6 oz)     Vitals:    04/21/25 2201 04/22/25 0240 04/22/25 0546 04/22/25 0725   BP: 126/75 129/77  131/79   BP Location: Left arm Left arm  Left arm   Pulse: 75 76  89   Resp: 22 19 16   Temp: 98.4 °F (36.9 °C) 97.9 °F (36.6 °C)  97.9 °F (36.6 °C)   TempSrc: Oral Oral  Oral   SpO2: 97% 92%  92%   Weight:   (!) 142 kg " (313 lb 0.9 oz)    Height:           Physical Exam  Constitutional:       Appearance: He is obese.   Neck:      Vascular: No JVD.   Cardiovascular:      Rate and Rhythm: Normal rate. Rhythm irregular.      Heart sounds: Normal heart sounds, S1 normal and S2 normal.   Pulmonary:      Effort: No respiratory distress.      Breath sounds: Normal air entry. Decreased breath sounds present.      Comments: TELLES improved  Musculoskeletal:      Right lower le+ Edema present.      Left lower le+ Edema present.   Skin:     General: Skin is warm and dry.   Neurological:      Mental Status: He is alert and oriented to person, place, and time. Mental status is at baseline.   Psychiatric:         Behavior: Behavior is cooperative.         Cognition and Memory: Cognition normal.           Current Facility-Administered Medications:     acetaminophen (TYLENOL) tablet 650 mg, 650 mg, Oral, Q6H PRN, Randy Reilly DO    albuterol (PROVENTIL HFA,VENTOLIN HFA) inhaler 2 puff, 2 puff, Inhalation, Q4H PRN, Randy Reilly DO    apixaban (ELIQUIS) tablet 5 mg, 5 mg, Oral, BID, Randy Reilly DO, 5 mg at 25 0820    atorvastatin (LIPITOR) tablet 10 mg, 10 mg, Oral, Daily, Randy Reilly DO, 10 mg at 25 0820    budesonide-formoterol (SYMBICORT) 160-4.5 mcg/act inhaler 2 puff, 2 puff, Inhalation, BID, Randy Reilly DO, 2 puff at 25 0820    bumetanide (BUMEX) 12.5 mg infusion 50 mL, 1 mg/hr, Intravenous, Continuous, Randy Reilly DO, Last Rate: 4 mL/hr at 25 0641, 1 mg/hr at 25 0641    cyanocobalamin (VITAMIN B-12) tablet 1,000 mcg, 1,000 mcg, Oral, Daily, Randy Reilly DO, 1,000 mcg at 25 0820    Empagliflozin (JARDIANCE) tablet 10 mg, 10 mg, Oral, Daily, Randy Reilly DO, 10 mg at 25 0822    insulin lispro (HumALOG/ADMELOG) 100 units/mL subcutaneous injection 1-6 Units, 1-6 Units, Subcutaneous, HS, Randy Reilly,  DO, 2 Units at 04/21/25 2230    insulin lispro (HumALOG/ADMELOG) 100 units/mL subcutaneous injection 2-12 Units, 2-12 Units, Subcutaneous, TID AC, 2 Units at 04/22/25 0639 **AND** Fingerstick Glucose (POCT), , , TID AC, Randy Reilly DO    magnesium Oxide (MAG-OX) tablet 400 mg, 400 mg, Oral, BID, Randy Reilly DO, 400 mg at 04/22/25 0820    metoprolol succinate (TOPROL-XL) 24 hr tablet 50 mg, 50 mg, Oral, Daily, Randy Reilly DO, 50 mg at 04/22/25 0820    ondansetron (ZOFRAN) injection 4 mg, 4 mg, Intravenous, Q6H PRN, Randy Reilly DO    potassium chloride (Klor-Con M20) CR tablet 40 mEq, 40 mEq, Oral, TID With Meals, Aster Gracía PA-C, 40 mEq at 04/22/25 0820    pregabalin (LYRICA) capsule 50 mg, 50 mg, Oral, HS, Randy Reilly DO, 50 mg at 04/21/25 2235    spironolactone (ALDACTONE) tablet 25 mg, 25 mg, Oral, Daily, Randy Reilly DO, 25 mg at 04/22/25 0820    umeclidinium 62.5 mcg/actuation inhaler AEPB 1 puff, 1 puff, Inhalation, Daily, Randy Reilly DO, 1 puff at 04/22/25 0820    Labs & Results:      Results from last 7 days   Lab Units 04/21/25  0437 04/20/25  0445 04/19/25  1848   WBC Thousand/uL 6.57 6.47 7.28   HEMOGLOBIN g/dL 11.9* 11.8* 12.0   HEMATOCRIT % 37.3 36.0* 37.0   PLATELETS Thousands/uL 270 283 313         Results from last 7 days   Lab Units 04/22/25  0753 04/21/25  1726 04/21/25  0905   POTASSIUM mmol/L 3.4* 3.8 3.8   CHLORIDE mmol/L 86* 91* 92*   CO2 mmol/L 34* 32 32   BUN mg/dL 31* 28* 26*   CREATININE mg/dL 1.88* 1.69* 1.69*   CALCIUM mg/dL 8.7 8.6 8.3*             Thank you for the opportunity to participate in the care of this patient.    RODRIGUE Flynn  Advanced Heart Failure  [facility]

## 2025-04-22 NOTE — UTILIZATION REVIEW
Continued Stay Review    Date: 4/22/2025                          Current Patient Class: Inpatient  Current Level of Care: med/surg    HPI:59 y.o. male initially admitted on 4/19   Current Diagnosis:Acute on chronic HFpEF, Stage C, NYHA III     Assessment/Plan: Stop Bumex gtt.  Volume status improved with weight bearing BL and TELLES improving. JVD down. Will give PO Bumex 6 mg tonight x 1 dose and start home regimen tomorrow Bumex 6 mg TID with dose of Metolazone 7.5 mg. Continue Toprol XL 50 Qd, Jardiance 10 Qd, spironolactone 25 QD.  BPs stable. Creat w/in BL. Continue to monitor I/Os, daily wts. Encourage to ambulate in hallways. Repeat BMP in AM, Keep K> 4 and Mg> 2. Strict I/O and daily standing wts.    Medications:   Scheduled Medications:  apixaban, 5 mg, Oral, BID  atorvastatin, 10 mg, Oral, Daily  budesonide-formoterol, 2 puff, Inhalation, BID  bumetanide, 6 mg, Oral, Once  [START ON 4/23/2025] bumetanide, 6 mg, Oral, TID (diuretic)  cyanocobalamin, 1,000 mcg, Oral, Daily  Empagliflozin, 10 mg, Oral, Daily  insulin lispro, 1-6 Units, Subcutaneous, HS  insulin lispro, 2-12 Units, Subcutaneous, TID AC  magnesium Oxide, 400 mg, Oral, BID  metoprolol succinate, 50 mg, Oral, Daily  potassium chloride, 40 mEq, Oral, TID With Meals  pregabalin, 50 mg, Oral, HS  spironolactone, 25 mg, Oral, Daily  umeclidinium, 1 puff, Inhalation, Daily    PRN Meds:  acetaminophen, 650 mg, Oral, Q6H PRN  albuterol, 2 puff, Inhalation, Q4H PRN  ondansetron, 4 mg, Intravenous, Q6H PRN      Discharge Plan: TBD    Vital Signs (last 3 days)       Date/Time Temp Pulse Resp BP MAP (mmHg) SpO2 O2 Device Patient Position - Orthostatic VS Lilly Coma Scale Score Pain    04/22/25 10:30:40 97.9 °F (36.6 °C) 79 17 123/74 90 92 % None (Room air) Sitting -- --    04/22/25 0800 -- -- -- -- -- 99 % None (Room air) -- 15 No Pain    04/22/25 07:25:34 97.9 °F (36.6 °C) 89 16 131/79 96 92 % None (Room air) Lying -- No Pain    04/22/25 02:40:19 97.9 °F  (36.6 °C) 76 19 129/77 94 92 % -- Lying -- --    04/21/25 2300 -- -- -- -- -- -- -- -- -- No Pain    04/21/25 22:01:30 98.4 °F (36.9 °C) 75 22 126/75 92 97 % -- Lying -- --    04/21/25 2000 -- -- -- -- -- -- None (Room air) -- 15 --    04/21/25 18:49:03 98.2 °F (36.8 °C) 81 16 115/73 87 95 % -- -- -- --    04/21/25 14:31:34 97.7 °F (36.5 °C) 69 16 129/71 90 97 % None (Room air) Sitting -- --    04/21/25 10:53:10 97.4 °F (36.3 °C) 72 15 124/69 87 97 % None (Room air) Sitting -- --    04/21/25 08:33:16 -- 80 18 123/69 87 96 % None (Room air) Sitting -- --    04/21/25 0800 -- -- -- -- -- -- -- -- 15 No Pain    04/21/25 07:39:10 98 °F (36.7 °C) 90 16 131/82 98 96 % None (Room air) Sitting -- No Pain    04/21/25 02:20:45 97.6 °F (36.4 °C) 71 16 122/73 89 96 % None (Room air) Lying -- --    04/20/25 21:53:08 97.9 °F (36.6 °C) -- 20 133/75 94 -- -- -- -- --    04/20/25 2100 -- -- -- -- -- -- -- -- -- No Pain    04/20/25 2000 -- -- -- -- -- -- None (Room air) -- -- --    04/20/25 18:50:12 97.2 °F (36.2 °C) 71 20 105/74 84 97 % -- -- -- --    04/20/25 15:07:59 97.5 °F (36.4 °C) 72 17 144/75 98 98 % None (Room air) Sitting -- --    04/20/25 11:07:34 97.3 °F (36.3 °C) 71 16 143/73 96 97 % -- Lying -- --    04/20/25 08:35:19 -- 82 -- 145/69 94 95 % -- -- -- --    04/20/25 0835 -- 82 -- 145/69 -- -- -- -- -- --    04/20/25 0830 -- -- -- -- -- -- -- -- -- No Pain    04/20/25 06:33:45 97.3 °F (36.3 °C) -- 17 146/69 95 -- -- -- -- --       Weight (last 2 days)       Date/Time Weight    04/22/25 0546 142 (313.05)    04/21/25 0555 146 (321.21)    04/21/25 0300 146 (321.21)    04/20/25 0600 147 (324.3)    04/20/25 0300 147 (324.3)       Pertinent Labs/Diagnostic Results:   Cardiology:  ECG 12 lead   Final Result by Billy Graves MD (04/21 7208)   Normal sinus rhythm   Low voltage QRS   Nonspecific ST and T wave abnormality   Prolonged QT   Abnormal ECG   No significant change was found   Confirmed by Billy Graves ([id number]) on 4/21/2025  3:37:23 PM          Results from last 7 days   Lab Units 04/21/25  0437 04/20/25  0445 04/19/25  1848   WBC Thousand/uL 6.57 6.47 7.28   HEMOGLOBIN g/dL 11.9* 11.8* 12.0   HEMATOCRIT % 37.3 36.0* 37.0   PLATELETS Thousands/uL 270 283 313   TOTAL NEUT ABS Thousands/µL  --   --  4.85       Results from last 7 days   Lab Units 04/22/25  0753 04/21/25  1726 04/21/25  0905 04/20/25  0445 04/19/25  1848   SODIUM mmol/L 132* 135 134* 136 137   POTASSIUM mmol/L 3.4* 3.8 3.8 3.9 3.8   CHLORIDE mmol/L 86* 91* 92* 96 101   CO2 mmol/L 34* 32 32 29 23   ANION GAP mmol/L 12 12 10 11 13   BUN mg/dL 31* 28* 26* 25 24   CREATININE mg/dL 1.88* 1.69* 1.69* 1.42* 1.45*   EGFR ml/min/1.73sq m 38 44 44 54 53   CALCIUM mg/dL 8.7 8.6 8.3* 7.9* 7.7*   MAGNESIUM mg/dL  --   --  2.2 1.8*  --        Results from last 7 days   Lab Units 04/22/25  1030 04/22/25  0635 04/21/25  2044 04/21/25  1530 04/21/25  1051 04/21/25  0615 04/20/25  2050 04/20/25  1551 04/20/25  1105 04/20/25  0605 04/19/25  2100   POC GLUCOSE mg/dl 197* 211* 192* 179* 184* 177* 198* 144* 160* 153* 171*     Results from last 7 days   Lab Units 04/22/25  0753 04/21/25  1726 04/21/25  0905 04/20/25  0445 04/19/25  1848   GLUCOSE RANDOM mg/dL 246* 173* 179* 131 170*       Network Utilization Review Department  ATTENTION: Please call with any questions or concerns to [phone] and carefully listen to the prompts so that you are directed to the right person. All voicemails are confidential.   For Discharge needs, contact Care Management DC Support Team at [phone] opt. 2  Send all requests for admission clinical reviews, approved or denied determinations and any other requests to dedicated fax number below belonging to the campus where the patient is receiving treatment. List of dedicated fax numbers for the Facilities:  FACILITY NAME UR FAX NUMBER   ADMISSION DENIALS (Administrative/Medical Necessity) [phone]   DISCHARGE SUPPORT TEAM (NETWORK) [phone]   PARENT  CHILD HEALTH (Maternity/NICU/Pediatrics) [phone]   [facility] [phone]   [facility] [phone]   [facility] [phone]   [facility] [phone]   [facility] [phone]   [facility] [phone]   [facility] [phone]   [facility] [phone]   [facility] [phone]   [facility] [phone]   [facility] [phone]   [facility] [phone]

## 2025-04-22 NOTE — ASSESSMENT & PLAN NOTE
2D echo 6/2024 EF 55%  Received IV Bumex gtt., now cardiology plans to transition to p.o. Bumex noted  Metolazone per cardiology  Continue metoprolol Aldactone Jardiance  Monitor and replete electrolytes  Low-salt diet  Medication compliance discussed and encouraged  Cardiology following

## 2025-04-22 NOTE — PROGRESS NOTES
Progress Note - Hospitalist   Name: Mesfin Cano 59 y.o. male I MRN: [id number]  Unit/Bed#: [facility] 520-01 I Date of Admission: 4/19/2025   Date of Service: 4/22/2025 I Hospital Day: 3     Assessment & Plan  Acute on chronic diastolic CHF (congestive heart failure) (HCC)  2D echo 6/2024 EF 55%  Received IV Bumex gtt., now cardiology plans to transition to p.o. Bumex noted  Metolazone per cardiology  Continue metoprolol Aldactone Jardiance  Monitor and replete electrolytes  Low-salt diet  Medication compliance discussed and encouraged  Cardiology following  Stage 3 chronic kidney disease ([facility])    Monitor kidney function closely  Avoid nephrotoxins  Primary hypertension  Monitor blood pressures  Avoid hypotension  Morbid obesity ([facility])  Therapeutic lifestyle modification encouraged  Outpatient sleep study discussed and recommended  Paroxysmal atrial fibrillation ([facility])  Continue metoprolol succinate 50 mg p.o. daily  Eliquis for anticoagulation  Type 2 diabetes mellitus without complication, without long-term current use of insulin ([facility])  A1c on 3/10/2025 -6.3  Monitor Accu-Cheks  Avoid hypoglycemia              VTE Pharmacologic Prophylaxis: VTE Score: 4 Moderate Risk (Score 3-4) - Pharmacological DVT Prophylaxis Ordered: apixaban (Eliquis).    Mobility:   Basic Mobility Inpatient Raw Score: 24  JH-HLM Goal: 8: Walk 250 feet or more  JH-HLM Achieved: 7: Walk 25 feet or more  JH-HLM Goal NOT achieved. Continue with multidisciplinary rounding and encourage appropriate mobility to improve upon JH-HLM goals.    Patient Centered Rounds: I performed bedside rounds with nursing staff today.   Discussions with Specialists or Other Care Team Provider: Case management    Education and Discussions with Family / Patient:  Discussed with the patient, updated spouse Karina questions answered.     Current Length of Stay: 3 day(s)  Current Patient Status: Inpatient   Certification Statement: The patient will continue to require  additional inpatient hospital stay due to as outlined  Discharge Plan:  Acute on chronic CHF ongoing management with cardiology as outlined    Code Status: Level 1 - Full Code    Subjective     Sitting up in bed  Reports improving dyspnea fatigue  Reports improving lower extremity swelling  Encourage out of bed and ambulation with assistance  History chart labs medications reviewed  Encourage incentive spirometry      Objective :  Temp:  [97.7 °F (36.5 °C)-98.4 °F (36.9 °C)] 97.9 °F (36.6 °C)  HR:  [69-89] 79  BP: (115-131)/(71-79) 123/74  Resp:  [16-22] 17  SpO2:  [92 %-99 %] 92 %  O2 Device: None (Room air)    Body mass index is 41.3 kg/m².     Input and Output Summary (last 24 hours):     Intake/Output Summary (Last 24 hours) at 4/22/2025 1350  Last data filed at 4/22/2025 1337  Gross per 24 hour   Intake 1713.26 ml   Output 7075 ml   Net -5361.74 ml       Physical Exam    Comfortably sitting up in bed  Obese  Short thick neck  Lungs diminished breath sounds  Heart sounds S1-S2 noted  Heart sounds are distant  Abdomen soft  Abdominal obesity noted  Awake follows commands  No rash    Lines/Drains:        Telemetry:  Telemetry Orders (From admission, onward)               24 Hour Telemetry Monitoring  Continuous x 24 Hours (Telem)        Expiring   Question:  Reason for 24 Hour Telemetry  Answer:  Decompensated CHF- and any one of the following: continuous diuretic infusion or total diuretic dose >200 mg daily, associated electrolyte derangement (I.e. K < 3.0), inotropic drip (continuous infusion), hx of ventricular arrhythmia, or new EF < 35%                     Telemetry Reviewed:  Sinus rhythm  Indication for Continued Telemetry Use: Acute CHF on >200 mg lasix/day or equivalent dose or with new reduced EF.                Lab Results: I have reviewed the following results:   Results from last 7 days   Lab Units 04/21/25  0437 04/20/25  0445 04/19/25  1848   WBC Thousand/uL 6.57   < > 7.28   HEMOGLOBIN g/dL 11.9*    < > 12.0   HEMATOCRIT % 37.3   < > 37.0   PLATELETS Thousands/uL 270   < > 313   SEGS PCT %  --   --  66   LYMPHO PCT %  --   --  17   MONO PCT %  --   --  8   EOS PCT %  --   --  7*    < > = values in this interval not displayed.     Results from last 7 days   Lab Units 04/22/25  0753   SODIUM mmol/L 132*   POTASSIUM mmol/L 3.4*   CHLORIDE mmol/L 86*   CO2 mmol/L 34*   BUN mg/dL 31*   CREATININE mg/dL 1.88*   ANION GAP mmol/L 12   CALCIUM mg/dL 8.7   GLUCOSE RANDOM mg/dL 246*         Results from last 7 days   Lab Units 04/22/25  1030 04/22/25  0635 04/21/25  2044 04/21/25  1530 04/21/25  1051 04/21/25  0615 04/20/25  2050 04/20/25  1551 04/20/25  1105 04/20/25  0605 04/19/25  2100   POC GLUCOSE mg/dl 197* 211* 192* 179* 184* 177* 198* 144* 160* 153* 171*               Recent Cultures (last 7 days):         Imaging Results Review: I personally reviewed the following image studies/reports in PACS and discussed pertinent findings with Radiology: chest xray and Echocardiogram. My interpretation of the radiology images/reports is: Lab results reviewed.      Last 24 Hours Medication List:     Current Facility-Administered Medications:     acetaminophen (TYLENOL) tablet 650 mg, Q6H PRN    albuterol (PROVENTIL HFA,VENTOLIN HFA) inhaler 2 puff, Q4H PRN    apixaban (ELIQUIS) tablet 5 mg, BID    atorvastatin (LIPITOR) tablet 10 mg, Daily    budesonide-formoterol (SYMBICORT) 160-4.5 mcg/act inhaler 2 puff, BID    cyanocobalamin (VITAMIN B-12) tablet 1,000 mcg, Daily    Empagliflozin (JARDIANCE) tablet 10 mg, Daily    insulin lispro (HumALOG/ADMELOG) 100 units/mL subcutaneous injection 1-6 Units, HS    insulin lispro (HumALOG/ADMELOG) 100 units/mL subcutaneous injection 2-12 Units, TID AC **AND** Fingerstick Glucose (POCT), TID AC    magnesium Oxide (MAG-OX) tablet 400 mg, BID    metoprolol succinate (TOPROL-XL) 24 hr tablet 50 mg, Daily    ondansetron (ZOFRAN) injection 4 mg, Q6H PRN    potassium chloride (Klor-Con M20) CR  tablet 40 mEq, TID With Meals    pregabalin (LYRICA) capsule 50 mg, HS    spironolactone (ALDACTONE) tablet 25 mg, Daily    umeclidinium 62.5 mcg/actuation inhaler AEPB 1 puff, Daily    Administrative Statements   Today, Patient Was Seen By: Arpan Coppola MD  I have spent a total time of 32 minutes in caring for this patient on the day of the visit/encounter including Diagnostic results, Risks and benefits of tx options, Instructions for management, Patient and family education, Importance of tx compliance, Risk factor reductions, Impressions, Counseling / Coordination of care, Documenting in the medical record, Reviewing/placing orders in the medical record (including tests, medications, and/or procedures), Obtaining or reviewing history  , and Communicating with other healthcare professionals .    **Please Note: This note may have been constructed using a voice recognition system.**

## 2025-04-23 LAB
ANION GAP SERPL CALCULATED.3IONS-SCNC: 14 MMOL/L (ref 4–13)
BUN SERPL-MCNC: 41 MG/DL (ref 5–25)
CALCIUM SERPL-MCNC: 8.7 MG/DL (ref 8.4–10.2)
CHLORIDE SERPL-SCNC: 85 MMOL/L (ref 96–108)
CO2 SERPL-SCNC: 34 MMOL/L (ref 21–32)
CREAT SERPL-MCNC: 2.28 MG/DL (ref 0.6–1.3)
GFR SERPL CREATININE-BSD FRML MDRD: 30 ML/MIN/1.73SQ M
GLUCOSE SERPL-MCNC: 172 MG/DL (ref 65–140)
GLUCOSE SERPL-MCNC: 194 MG/DL (ref 65–140)
GLUCOSE SERPL-MCNC: 197 MG/DL (ref 65–140)
GLUCOSE SERPL-MCNC: 219 MG/DL (ref 65–140)
GLUCOSE SERPL-MCNC: 277 MG/DL (ref 65–140)
MAGNESIUM SERPL-MCNC: 2.4 MG/DL (ref 1.9–2.7)
POTASSIUM SERPL-SCNC: 3.5 MMOL/L (ref 3.5–5.3)
SODIUM SERPL-SCNC: 133 MMOL/L (ref 135–147)

## 2025-04-23 PROCEDURE — 99232 SBSQ HOSP IP/OBS MODERATE 35: CPT | Performed by: INTERNAL MEDICINE

## 2025-04-23 PROCEDURE — 80048 BASIC METABOLIC PNL TOTAL CA: CPT | Performed by: INTERNAL MEDICINE

## 2025-04-23 PROCEDURE — 82948 REAGENT STRIP/BLOOD GLUCOSE: CPT

## 2025-04-23 PROCEDURE — 83735 ASSAY OF MAGNESIUM: CPT | Performed by: INTERNAL MEDICINE

## 2025-04-23 RX ORDER — POTASSIUM CHLORIDE 1500 MG/1
40 TABLET, EXTENDED RELEASE ORAL ONCE
Status: COMPLETED | OUTPATIENT
Start: 2025-04-23 | End: 2025-04-23

## 2025-04-23 RX ADMIN — BUMETANIDE 6 MG: 2 TABLET ORAL at 11:30

## 2025-04-23 RX ADMIN — CYANOCOBALAMIN TAB 500 MCG 1000 MCG: 500 TAB at 08:17

## 2025-04-23 RX ADMIN — BUMETANIDE 6 MG: 2 TABLET ORAL at 17:17

## 2025-04-23 RX ADMIN — BUMETANIDE 6 MG: 2 TABLET ORAL at 05:46

## 2025-04-23 RX ADMIN — POTASSIUM CHLORIDE 40 MEQ: 1500 TABLET, EXTENDED RELEASE ORAL at 08:17

## 2025-04-23 RX ADMIN — INSULIN LISPRO 6 UNITS: 100 INJECTION, SOLUTION INTRAVENOUS; SUBCUTANEOUS at 11:30

## 2025-04-23 RX ADMIN — POTASSIUM CHLORIDE 40 MEQ: 1500 TABLET, EXTENDED RELEASE ORAL at 17:15

## 2025-04-23 RX ADMIN — APIXABAN 5 MG: 5 TABLET, FILM COATED ORAL at 17:15

## 2025-04-23 RX ADMIN — BUDESONIDE AND FORMOTEROL FUMARATE DIHYDRATE 2 PUFF: 160; 4.5 AEROSOL RESPIRATORY (INHALATION) at 08:17

## 2025-04-23 RX ADMIN — MAGNESIUM OXIDE TAB 400 MG (241.3 MG ELEMENTAL MG) 400 MG: 400 (241.3 MG) TAB at 17:15

## 2025-04-23 RX ADMIN — ATORVASTATIN CALCIUM 10 MG: 10 TABLET, FILM COATED ORAL at 08:17

## 2025-04-23 RX ADMIN — POTASSIUM CHLORIDE 40 MEQ: 1500 TABLET, EXTENDED RELEASE ORAL at 11:30

## 2025-04-23 RX ADMIN — POTASSIUM CHLORIDE 40 MEQ: 1500 TABLET, EXTENDED RELEASE ORAL at 21:02

## 2025-04-23 RX ADMIN — METOPROLOL SUCCINATE 50 MG: 50 TABLET, EXTENDED RELEASE ORAL at 08:17

## 2025-04-23 RX ADMIN — PREGABALIN 50 MG: 50 CAPSULE ORAL at 21:02

## 2025-04-23 RX ADMIN — INSULIN LISPRO 2 UNITS: 100 INJECTION, SOLUTION INTRAVENOUS; SUBCUTANEOUS at 17:17

## 2025-04-23 RX ADMIN — INSULIN LISPRO 2 UNITS: 100 INJECTION, SOLUTION INTRAVENOUS; SUBCUTANEOUS at 21:01

## 2025-04-23 RX ADMIN — APIXABAN 5 MG: 5 TABLET, FILM COATED ORAL at 08:17

## 2025-04-23 RX ADMIN — MAGNESIUM OXIDE TAB 400 MG (241.3 MG ELEMENTAL MG) 400 MG: 400 (241.3 MG) TAB at 08:17

## 2025-04-23 RX ADMIN — EMPAGLIFLOZIN 10 MG: 10 TABLET, FILM COATED ORAL at 08:18

## 2025-04-23 RX ADMIN — UMECLIDINIUM 1 PUFF: 62.5 AEROSOL, POWDER ORAL at 08:17

## 2025-04-23 RX ADMIN — SPIRONOLACTONE 25 MG: 25 TABLET, FILM COATED ORAL at 08:17

## 2025-04-23 RX ADMIN — LORATADINE 10 MG: 10 TABLET ORAL at 08:25

## 2025-04-23 RX ADMIN — BUDESONIDE AND FORMOTEROL FUMARATE DIHYDRATE 2 PUFF: 160; 4.5 AEROSOL RESPIRATORY (INHALATION) at 17:16

## 2025-04-23 RX ADMIN — INSULIN LISPRO 2 UNITS: 100 INJECTION, SOLUTION INTRAVENOUS; SUBCUTANEOUS at 06:02

## 2025-04-23 NOTE — CASE MANAGEMENT
Case Management Discharge Planning Note    Patient name Mesfin Cano  Location [facility] 520/[facility] 520-01 MRN [id number]  : 1965 Date 2025       Current Admission Date: 2025  Current Admission Diagnosis:Acute on chronic diastolic CHF (congestive heart failure) (HCC)   Patient Active Problem List    Diagnosis Date Noted Date Diagnosed    Pancreatic cyst 2025     Liver lesion, left lobe 2025     Serum total bilirubin elevated 2025     Acute on chronic diastolic CHF (congestive heart failure) (HCC) 2025     Iron deficiency 10/07/2024     Onychomycosis 10/05/2024     Moderate persistent asthma without complication 2024     Type 2 diabetes mellitus without complication, without long-term current use of insulin (HCC) 2023     Anemia due to chronic kidney disease 10/16/2023     Chronic heart failure with preserved ejection fraction (HFpEF, >= 50%) (HCC) 04/10/2023     Diabetic polyneuropathy associated with type 2 diabetes mellitus (HCC) 2022     Stage 3 chronic kidney disease (HCC) 2022     Presence of CardioMEMS HF system 2022     Paroxysmal atrial fibrillation (HCC) 2022     Foraminal stenosis of lumbar region 2021     VICKY (obstructive sleep apnea)      Hyperlipidemia 2019     Primary osteoarthritis of both knees 2018     Irritable bowel syndrome with diarrhea 2018     Primary hypertension 2018     Vitamin B12 deficiency 2016     Vitamin D deficiency 2016     Morbid obesity (HCC) 2016       LOS (days): 4  Geometric Mean LOS (GMLOS) (days):   Days to GMLOS:     OBJECTIVE:  Risk of Unplanned Readmission Score: 38.14         Current admission status: Inpatient   Preferred Pharmacy:   [id number] - [place], [place] - [address]  [place] [address]  Phone: [phone] Fax: [phone]    Primary Care Provider: RODRIGUE Hodge    Primary Insurance: BLUE  CROSS  Secondary Insurance:     DISCHARGE DETAILS:            Other Referral/Resources/Interventions Provided:  Referral Comments: Pt discussed during care coordination rounds with [facility].& pt wants OP cardiac rehab. [facility] to place referral when medically cleared. S/w pt & he had this in the past at [facility] & would like to go again. Aware phone number placed on f/u providers when dc'd. Offered [facility] again for CHF follow up & pt continues to decline.

## 2025-04-23 NOTE — PROGRESS NOTES
Advanced Heart Failure / Pulmonary Hypertension Service Progress Note    Mesfin Cano 59 y.o. male   MRN: [id number]  Unit/Bed#: [facility] 520-01; Encounter: [id number]    Assessment:  Principal Problem:    Acute on chronic diastolic CHF (congestive heart failure) (HCC)  Active Problems:    Primary hypertension    Morbid obesity (HCC)    Paroxysmal atrial fibrillation (HCC)    Stage 3 chronic kidney disease (HCC)    Presence of CardioMEMS HF system    Type 2 diabetes mellitus without complication, without long-term current use of insulin (HCC)      Mesfin Cano is a 59-year-old man with PMH as below who presented to [facility] on 04/19/2025 (7th admission in past 12 months) with reported 15 lbs weight gain in past 48 hours. Reported feeling well on 04/18; weight on home scale of 314 lbs. Then on 04/19, noted weight of 327 lbs in the afternoon with associated TELLES and bilateral LE swelling. Denies lightheadedness, chest pain, wheezing, SOB at rest, and orthopnea. Reports less or urine output with diuretics over past few days     Subjective:   Patient seen and examined. Pt feeling well since admission with ease of breathing, less leg swelling, and closer to BL weight.  Pt agreeable to DC home in next 24-48 hrs    Objective:   Intake/ Output: 1731/ 4100 (-2.3)  Weight: admit 323-->321--313--312 lbs (-7 lbs, )   Telemetry: SR 80.  PAF HR controlled    Today's Plan:  Continue Bumex 6 TID.  Volume status improved with weight nearing BL and TELLES improving.  JVD down. Creatinine above BL.  Will add Metolazone 7.5 mg tomorrow if creatinine improves + monitor response, check electrolytes  Continue Toprol XL 50 Qd, Jardiance 10 Qd, spironolactone 25 QD.  BPs stable.    Potential to DC home Thursday/Friday   Repeat BMP in a.m.    Plan:  Acute on chronic HFpEF; LVEF 55%              Impression: unclear etiology of current exacerbation. ?increased Afib burden.  RHC / MEMS calibration 10/11/2023: CardioMEMS data  "correlates to C.      Guideline Directed Medical Therapy:  --Aldosterone Antagonist: spironolactone 25 mg daily.   --SGLT2 Inhibitor: empagliflozin 10 mg daily.      Volume Management:  --Home Diuretic: Bumex 6 mg TID with metolazone 5 mg TTSa.   --Inpatient Diuretic: IV Bumex 1 mg/hr.   --K supplementation: potassium 40 mEq TID.                  Advanced Therapies:              --CardioMEMS: implanted 03/09/2022. Goal PAd of 12 mmHg.     Atrial fibrillation, parosxysmal              [id number] = 3 (HF, HTN, DM).              Anticoagulation on Eliquis.               Rate control: metoprolol succinate 50 mg daily.              Rhythm control: No.     Chronic kidney disease, stage IIIa (1.5-2.0)  Hypertension  Hyperlipidemia  Asthma, severe persistent: follows with Dr. Noonan as outpatient.   Obstructive sleep apnea  Diabetes mellitus, type II  History of gastric bypass (Samuel-en-Y) surgery   History of tobacco abuse       Vitals:   Blood pressure 120/70, pulse 88, temperature 98.2 °F (36.8 °C), resp. rate 17, height 6' 1\" (1.854 m), weight (!) 142 kg (312 lb 6.3 oz), SpO2 95%.    Body mass index is 41.22 kg/m².    I/O last 3 completed shifts:  In: 2358.3 [P.O.:2205; I.V.:153.3]  Out: 7200 [Urine:7200]    Wt Readings from Last 10 Encounters:   04/23/25 (!) 142 kg (312 lb 6.3 oz)   04/14/25 (!) 145 kg (319 lb 3.2 oz)   04/09/25 (!) 145 kg (319 lb 6.4 oz)   03/26/25 (!) 147 kg (325 lb)   03/20/25 (!) 141 kg (309 lb 15.5 oz)   03/10/25 (!) 151 kg (333 lb 3.2 oz)   01/31/25 (!) 143 kg (315 lb)   01/09/25 (!) 143 kg (315 lb 0.6 oz)   12/04/24 (!) 147 kg (323 lb 6.4 oz)   11/28/24 (!) 143 kg (314 lb 9.6 oz)     Vitals:    04/23/25 0251 04/23/25 0252 04/23/25 0551 04/23/25 0811   BP: 118/71 118/71  120/70   BP Location:       Pulse: 86 81  88   Resp:       Temp: (!) 97.4 °F (36.3 °C) (!) 97.4 °F (36.3 °C)  98.2 °F (36.8 °C)   TempSrc:       SpO2: 92% 94%  95%   Weight:   (!) 142 kg (312 lb 6.3 oz)    Height:     "       Physical Exam  Constitutional:       Appearance: He is obese.   Neck:      Vascular: No JVD.   Cardiovascular:      Rate and Rhythm: Normal rate and regular rhythm. No extrasystoles are present.     Comments: Episodes of Atrial Fib  Pulmonary:      Effort: No respiratory distress.      Breath sounds: Normal air entry. Decreased breath sounds present.      Comments: TELLES improved  Skin:     General: Skin is warm and dry.   Neurological:      Mental Status: He is alert and oriented to person, place, and time. Mental status is at baseline.   Psychiatric:         Behavior: Behavior is cooperative.         Cognition and Memory: Cognition normal.           Current Facility-Administered Medications:     acetaminophen (TYLENOL) tablet 650 mg, 650 mg, Oral, Q6H PRN, Randy Reilly DO    albuterol (PROVENTIL HFA,VENTOLIN HFA) inhaler 2 puff, 2 puff, Inhalation, Q4H PRN, Randy Reilly DO    apixaban (ELIQUIS) tablet 5 mg, 5 mg, Oral, BID, Randy Reilly DO, 5 mg at 04/23/25 0817    atorvastatin (LIPITOR) tablet 10 mg, 10 mg, Oral, Daily, Randy Reilly DO, 10 mg at 04/23/25 0817    budesonide-formoterol (SYMBICORT) 160-4.5 mcg/act inhaler 2 puff, 2 puff, Inhalation, BID, Randy Reilly DO, 2 puff at 04/23/25 0817    bumetanide (BUMEX) tablet 6 mg, 6 mg, Oral, TID (diuretic), RODRIGUE Sahu, 6 mg at 04/23/25 0546    cyanocobalamin (VITAMIN B-12) tablet 1,000 mcg, 1,000 mcg, Oral, Daily, Randy Reilly DO, 1,000 mcg at 04/23/25 0817    Empagliflozin (JARDIANCE) tablet 10 mg, 10 mg, Oral, Daily, Randy Reilly DO, 10 mg at 04/23/25 0818    insulin lispro (HumALOG/ADMELOG) 100 units/mL subcutaneous injection 1-6 Units, 1-6 Units, Subcutaneous, HS, Randy Reilly DO, 3 Units at 04/22/25 2124    insulin lispro (HumALOG/ADMELOG) 100 units/mL subcutaneous injection 2-12 Units, 2-12 Units, Subcutaneous, TID AC, 2 Units at 04/23/25 0602 **AND**  Fingerstick Glucose (POCT), , , TID AC, Randy Reilly DO    loratadine (CLARITIN) tablet 10 mg, 10 mg, Oral, Daily PRN, Lindsey Lofton PA-C, 10 mg at 04/23/25 0825    magnesium Oxide (MAG-OX) tablet 400 mg, 400 mg, Oral, BID, Randy Reilly DO, 400 mg at 04/23/25 0817    metoprolol succinate (TOPROL-XL) 24 hr tablet 50 mg, 50 mg, Oral, Daily, Randy Reilly DO, 50 mg at 04/23/25 0817    ondansetron (ZOFRAN) injection 4 mg, 4 mg, Intravenous, Q6H PRN, Randy Reilly DO    potassium chloride (Klor-Con M20) CR tablet 40 mEq, 40 mEq, Oral, TID With Meals, Aster García PA-C, 40 mEq at 04/23/25 0817    pregabalin (LYRICA) capsule 50 mg, 50 mg, Oral, HS, Randy Reilly DO, 50 mg at 04/22/25 2124    spironolactone (ALDACTONE) tablet 25 mg, 25 mg, Oral, Daily, Randy Reilly DO, 25 mg at 04/23/25 0817    umeclidinium 62.5 mcg/actuation inhaler AEPB 1 puff, 1 puff, Inhalation, Daily, Randy Reilly DO, 1 puff at 04/23/25 0817    Labs & Results:      Results from last 7 days   Lab Units 04/21/25  0437 04/20/25  0445 04/19/25  1848   WBC Thousand/uL 6.57 6.47 7.28   HEMOGLOBIN g/dL 11.9* 11.8* 12.0   HEMATOCRIT % 37.3 36.0* 37.0   PLATELETS Thousands/uL 270 283 313         Results from last 7 days   Lab Units 04/23/25  0217 04/22/25  0753 04/21/25  1726   POTASSIUM mmol/L 3.5 3.4* 3.8   CHLORIDE mmol/L 85* 86* 91*   CO2 mmol/L 34* 34* 32   BUN mg/dL 41* 31* 28*   CREATININE mg/dL 2.28* 1.88* 1.69*   CALCIUM mg/dL 8.7 8.7 8.6             Thank you for the opportunity to participate in the care of this patient.    RODRIGUE Flynn  Advanced Heart Failure  [facility]

## 2025-04-23 NOTE — ASSESSMENT & PLAN NOTE
2D echo 6/2024 EF 55%  Received IV Bumex gtt., now transition to Bumex 6 mg p.o. 3 times daily per cardiology  Continue metoprolol succinate 50 mg daily, Aldactone 25 mg p.o. daily, empagliflozin 10 mg p.o. daily  Low-salt diet  Monitor and replete electrolytes  Medication compliance discussed and encouraged  Cardiology following

## 2025-04-23 NOTE — ASSESSMENT & PLAN NOTE
Therapeutic lifestyle modification encouraged  Outpatient sleep study discussed and strongly encouraged

## 2025-04-23 NOTE — PROGRESS NOTES
Progress Note - Hospitalist   Name: Mesfin Cano 59 y.o. male I MRN: [id number]  Unit/Bed#: [facility] 520-01 I Date of Admission: 4/19/2025   Date of Service: 4/23/2025 I Hospital Day: 4     Assessment & Plan  Acute on chronic diastolic CHF (congestive heart failure) ([facility])  2D echo 6/2024 EF 55%  Received IV Bumex gtt., now transition to Bumex 6 mg p.o. 3 times daily per cardiology  Continue metoprolol succinate 50 mg daily, Aldactone 25 mg p.o. daily, empagliflozin 10 mg p.o. daily  Low-salt diet  Monitor and replete electrolytes  Medication compliance discussed and encouraged  Cardiology following  Stage 3 chronic kidney disease ([facility])  Monitor kidney function closely  Avoid nephrotoxins  Primary hypertension  Blood pressures reviewed, acceptable  Monitor blood pressures  Avoid hypotension  Morbid obesity ([facility])  Therapeutic lifestyle modification encouraged  Outpatient sleep study discussed and strongly encouraged  Paroxysmal atrial fibrillation ([facility])  Continue metoprolol succinate 50 mg p.o. daily  Eliquis for anticoagulation  Type 2 diabetes mellitus without complication, without long-term current use of insulin ([facility])  A1c on 3/10/2025 - 6.3  Continue empagliflozin  Sitagliptin on hold while inpatient  Avoid hypoglycemia  Monitor Accu-Cheks            VTE Pharmacologic Prophylaxis: VTE Score: 4 Moderate Risk (Score 3-4) - Pharmacological DVT Prophylaxis Ordered: apixaban (Eliquis).    Mobility:   Basic Mobility Inpatient Raw Score: 24  JH-HLM Goal: 8: Walk 250 feet or more  JH-HLM Achieved: 8: Walk 250 feet ot more  JH-HLM Goal achieved. Continue to encourage appropriate mobility.    Patient Centered Rounds: I performed bedside rounds with nursing staff today.   Discussions with Specialists or Other Care Team Provider: Case management    Education and Discussions with Family / Patient:  Discussed with the patient, call left a message for spouse Karina .     Current Length of Stay: 4 day(s)  Current Patient Status:  Inpatient   Certification Statement: The patient will continue to require additional inpatient hospital stay due to as outlined  Discharge Plan:  Awaiting clinical and symptomatic improvement, cardiology inputs for disposition planning    Code Status: Level 1 - Full Code    Subjective     Comfortably sitting up in bed  Reports feeling better  Encourage out of bed and ambulation as able  Discussed with RN      Objective :  Temp:  [97.4 °F (36.3 °C)-98.2 °F (36.8 °C)] 97.6 °F (36.4 °C)  HR:  [79-88] 79  BP: (113-128)/(66-77) 113/66  Resp:  [17-18] 18  SpO2:  [91 %-97 %] 91 %  O2 Device: None (Room air)    Body mass index is 41.22 kg/m².     Input and Output Summary (last 24 hours):     Intake/Output Summary (Last 24 hours) at 4/23/2025 1058  Last data filed at 4/23/2025 0830  Gross per 24 hour   Intake 1125 ml   Output 4200 ml   Net -3075 ml       Physical Exam    Comfortably in bed  Obese  Short thick neck  Lungs diminished breath sounds  Heart sounds S1-S2 noted  Heart sounds are distant  Abdomen soft nontender  Awake follows commands  No rash      Lines/Drains:        Telemetry:  Telemetry Orders (From admission, onward)               24 Hour Telemetry Monitoring  Continuous x 24 Hours (Telem)        Expiring   Question:  Reason for 24 Hour Telemetry  Answer:  Decompensated CHF- and any one of the following: continuous diuretic infusion or total diuretic dose >200 mg daily, associated electrolyte derangement (I.e. K < 3.0), inotropic drip (continuous infusion), hx of ventricular arrhythmia, or new EF < 35%                     Telemetry Reviewed:  Sinus rhythm  Indication for Continued Telemetry Use: Acute CHF on >200 mg lasix/day or equivalent dose or with new reduced EF.                Lab Results: I have reviewed the following results:   Results from last 7 days   Lab Units 04/21/25  0437 04/20/25  0445 04/19/25  1848   WBC Thousand/uL 6.57   < > 7.28   HEMOGLOBIN g/dL 11.9*   < > 12.0   HEMATOCRIT % 37.3   < >  37.0   PLATELETS Thousands/uL 270   < > 313   SEGS PCT %  --   --  66   LYMPHO PCT %  --   --  17   MONO PCT %  --   --  8   EOS PCT %  --   --  7*    < > = values in this interval not displayed.     Results from last 7 days   Lab Units 04/23/25  0217   SODIUM mmol/L 133*   POTASSIUM mmol/L 3.5   CHLORIDE mmol/L 85*   CO2 mmol/L 34*   BUN mg/dL 41*   CREATININE mg/dL 2.28*   ANION GAP mmol/L 14*   CALCIUM mg/dL 8.7   GLUCOSE RANDOM mg/dL 172*         Results from last 7 days   Lab Units 04/23/25  1034 04/23/25  0545 04/22/25  2054 04/22/25  1533 04/22/25  1030 04/22/25  0635 04/21/25  2044 04/21/25  1530 04/21/25  1051 04/21/25  0615 04/20/25  2050 04/20/25  1551   POC GLUCOSE mg/dl 277* 197* 256* 185* 197* 211* 192* 179* 184* 177* 198* 144*               Recent Cultures (last 7 days):         Imaging Results Review: I personally reviewed the following image studies/reports in PACS and discussed pertinent findings with Radiology: chest xray. My interpretation of the radiology images/reports is: Lab results reviewed.      Last 24 Hours Medication List:     Current Facility-Administered Medications:     acetaminophen (TYLENOL) tablet 650 mg, Q6H PRN    albuterol (PROVENTIL HFA,VENTOLIN HFA) inhaler 2 puff, Q4H PRN    apixaban (ELIQUIS) tablet 5 mg, BID    atorvastatin (LIPITOR) tablet 10 mg, Daily    budesonide-formoterol (SYMBICORT) 160-4.5 mcg/act inhaler 2 puff, BID    bumetanide (BUMEX) tablet 6 mg, TID (diuretic)    cyanocobalamin (VITAMIN B-12) tablet 1,000 mcg, Daily    Empagliflozin (JARDIANCE) tablet 10 mg, Daily    insulin lispro (HumALOG/ADMELOG) 100 units/mL subcutaneous injection 1-6 Units, HS    insulin lispro (HumALOG/ADMELOG) 100 units/mL subcutaneous injection 2-12 Units, TID AC **AND** Fingerstick Glucose (POCT), TID AC    loratadine (CLARITIN) tablet 10 mg, Daily PRN    magnesium Oxide (MAG-OX) tablet 400 mg, BID    metoprolol succinate (TOPROL-XL) 24 hr tablet 50 mg, Daily    ondansetron (ZOFRAN)  injection 4 mg, Q6H PRN    potassium chloride (Klor-Con M20) CR tablet 40 mEq, TID With Meals    pregabalin (LYRICA) capsule 50 mg, HS    spironolactone (ALDACTONE) tablet 25 mg, Daily    umeclidinium 62.5 mcg/actuation inhaler AEPB 1 puff, Daily    Administrative Statements   Today, Patient Was Seen By: Arpan Coppola MD  I have spent a total time of 31 minutes in caring for this patient on the day of the visit/encounter including Diagnostic results, Risks and benefits of tx options, Instructions for management, Patient and family education, Importance of tx compliance, Risk factor reductions, Impressions, Counseling / Coordination of care, Documenting in the medical record, Reviewing/placing orders in the medical record (including tests, medications, and/or procedures), Obtaining or reviewing history  , and Communicating with other healthcare professionals .    **Please Note: This note may have been constructed using a voice recognition system.**

## 2025-04-23 NOTE — ASSESSMENT & PLAN NOTE
A1c on 3/10/2025 - 6.3  Continue empagliflozin  Sitagliptin on hold while inpatient  Avoid hypoglycemia  Monitor Accu-Cheks

## 2025-04-24 ENCOUNTER — PATIENT OUTREACH (OUTPATIENT)
Dept: CARDIOLOGY CLINIC | Facility: CLINIC | Age: 60
End: 2025-04-24

## 2025-04-24 LAB
ANION GAP SERPL CALCULATED.3IONS-SCNC: 14 MMOL/L (ref 4–13)
BUN SERPL-MCNC: 52 MG/DL (ref 5–25)
CALCIUM SERPL-MCNC: 8.5 MG/DL (ref 8.4–10.2)
CHLORIDE SERPL-SCNC: 86 MMOL/L (ref 96–108)
CO2 SERPL-SCNC: 33 MMOL/L (ref 21–32)
CREAT SERPL-MCNC: 2.11 MG/DL (ref 0.6–1.3)
GFR SERPL CREATININE-BSD FRML MDRD: 33 ML/MIN/1.73SQ M
GLUCOSE SERPL-MCNC: 197 MG/DL (ref 65–140)
GLUCOSE SERPL-MCNC: 218 MG/DL (ref 65–140)
GLUCOSE SERPL-MCNC: 234 MG/DL (ref 65–140)
GLUCOSE SERPL-MCNC: 234 MG/DL (ref 65–140)
GLUCOSE SERPL-MCNC: 258 MG/DL (ref 65–140)
POTASSIUM SERPL-SCNC: 3.4 MMOL/L (ref 3.5–5.3)
SODIUM SERPL-SCNC: 133 MMOL/L (ref 135–147)

## 2025-04-24 PROCEDURE — 82948 REAGENT STRIP/BLOOD GLUCOSE: CPT

## 2025-04-24 PROCEDURE — 80048 BASIC METABOLIC PNL TOTAL CA: CPT

## 2025-04-24 PROCEDURE — 99232 SBSQ HOSP IP/OBS MODERATE 35: CPT | Performed by: INTERNAL MEDICINE

## 2025-04-24 RX ORDER — SPIRONOLACTONE 25 MG/1
25 TABLET ORAL 2 TIMES DAILY
Status: DISCONTINUED | OUTPATIENT
Start: 2025-04-24 | End: 2025-04-27 | Stop reason: HOSPADM

## 2025-04-24 RX ORDER — POTASSIUM CHLORIDE 1500 MG/1
40 TABLET, EXTENDED RELEASE ORAL ONCE
Status: COMPLETED | OUTPATIENT
Start: 2025-04-24 | End: 2025-04-24

## 2025-04-24 RX ADMIN — EMPAGLIFLOZIN 10 MG: 10 TABLET, FILM COATED ORAL at 08:22

## 2025-04-24 RX ADMIN — MAGNESIUM OXIDE TAB 400 MG (241.3 MG ELEMENTAL MG) 400 MG: 400 (241.3 MG) TAB at 17:18

## 2025-04-24 RX ADMIN — POTASSIUM CHLORIDE 40 MEQ: 1500 TABLET, EXTENDED RELEASE ORAL at 21:05

## 2025-04-24 RX ADMIN — SPIRONOLACTONE 25 MG: 25 TABLET, FILM COATED ORAL at 08:21

## 2025-04-24 RX ADMIN — INSULIN LISPRO 4 UNITS: 100 INJECTION, SOLUTION INTRAVENOUS; SUBCUTANEOUS at 06:58

## 2025-04-24 RX ADMIN — UMECLIDINIUM 1 PUFF: 62.5 AEROSOL, POWDER ORAL at 08:22

## 2025-04-24 RX ADMIN — POTASSIUM CHLORIDE 40 MEQ: 1500 TABLET, EXTENDED RELEASE ORAL at 11:36

## 2025-04-24 RX ADMIN — LORATADINE 10 MG: 10 TABLET ORAL at 17:23

## 2025-04-24 RX ADMIN — BUMETANIDE 6 MG: 2 TABLET ORAL at 11:36

## 2025-04-24 RX ADMIN — APIXABAN 5 MG: 5 TABLET, FILM COATED ORAL at 17:18

## 2025-04-24 RX ADMIN — BUDESONIDE AND FORMOTEROL FUMARATE DIHYDRATE 2 PUFF: 160; 4.5 AEROSOL RESPIRATORY (INHALATION) at 17:19

## 2025-04-24 RX ADMIN — BUMETANIDE 6 MG: 2 TABLET ORAL at 05:11

## 2025-04-24 RX ADMIN — PREGABALIN 50 MG: 50 CAPSULE ORAL at 21:05

## 2025-04-24 RX ADMIN — METOPROLOL SUCCINATE 50 MG: 50 TABLET, EXTENDED RELEASE ORAL at 08:21

## 2025-04-24 RX ADMIN — POTASSIUM CHLORIDE 40 MEQ: 1500 TABLET, EXTENDED RELEASE ORAL at 17:18

## 2025-04-24 RX ADMIN — INSULIN LISPRO 6 UNITS: 100 INJECTION, SOLUTION INTRAVENOUS; SUBCUTANEOUS at 11:36

## 2025-04-24 RX ADMIN — APIXABAN 5 MG: 5 TABLET, FILM COATED ORAL at 08:21

## 2025-04-24 RX ADMIN — BUMETANIDE 6 MG: 2 TABLET ORAL at 17:18

## 2025-04-24 RX ADMIN — INSULIN LISPRO 2 UNITS: 100 INJECTION, SOLUTION INTRAVENOUS; SUBCUTANEOUS at 17:18

## 2025-04-24 RX ADMIN — INSULIN LISPRO 3 UNITS: 100 INJECTION, SOLUTION INTRAVENOUS; SUBCUTANEOUS at 21:09

## 2025-04-24 RX ADMIN — MAGNESIUM OXIDE TAB 400 MG (241.3 MG ELEMENTAL MG) 400 MG: 400 (241.3 MG) TAB at 08:21

## 2025-04-24 RX ADMIN — SPIRONOLACTONE 25 MG: 25 TABLET, FILM COATED ORAL at 17:18

## 2025-04-24 RX ADMIN — BUDESONIDE AND FORMOTEROL FUMARATE DIHYDRATE 2 PUFF: 160; 4.5 AEROSOL RESPIRATORY (INHALATION) at 08:22

## 2025-04-24 RX ADMIN — POTASSIUM CHLORIDE 40 MEQ: 1500 TABLET, EXTENDED RELEASE ORAL at 07:03

## 2025-04-24 RX ADMIN — ATORVASTATIN CALCIUM 10 MG: 10 TABLET, FILM COATED ORAL at 08:21

## 2025-04-24 RX ADMIN — CYANOCOBALAMIN TAB 500 MCG 1000 MCG: 500 TAB at 08:21

## 2025-04-24 NOTE — PROGRESS NOTES
Advanced Heart Failure / Pulmonary Hypertension Service Progress Note    Mesfin Cano 59 y.o. male   MRN: [id number]  Unit/Bed#: [facility] 520-01; Encounter: [id number]    Assessment:  Principal Problem:    Acute on chronic diastolic CHF (congestive heart failure) (HCC)  Active Problems:    Primary hypertension    Morbid obesity (HCC)    Paroxysmal atrial fibrillation (HCC)    Stage 3 chronic kidney disease (HCC)    Presence of CardioMEMS HF system    Type 2 diabetes mellitus without complication, without long-term current use of insulin (HCC)    Mesfin Cano is a 59-year-old man with PMH as below who presented to [facility] on 04/19/2025 (7th admission in past 12 months) with reported 15 lbs weight gain in past 48 hours. Reported feeling well on 04/18; weight on home scale of 314 lbs. Then on 04/19, noted weight of 327 lbs in the afternoon with associated TELLES and bilateral LE swelling. Denies lightheadedness, chest pain, wheezing, SOB at rest, and orthopnea. Reports less or urine output with diuretics over past few days     Subjective:   Patient seen and examined. No significant events overnight. Pt is feeling well overall.  Agreeable to stay until Friday for medication optimizations    Objective:   Intake/ Output: 630/ 3900 (-3.2)  Weight: admit 323-->321--313--312 lbs--309 lbs (-3 lbs, )   Telemetry: SR HR 70-80's    Today's Plan:  Continue Bumex 6 TID.  Volume status improved with 3 lb weight loss, TELLES improving.  JVD down. Creatinine trending back down. Will add Metolazone when Potassium more stable, and if BP's remain stable on higher dose of Spironolactone     Continue Toprol XL 50 Qd, Jardiance 10 Qd, Increase Spironolactone 25 BID.      Tentative DC Friday or Saturday.   Repeat BMP a.m.      Plan:  Acute on chronic HFpEF; LVEF 55%              Impression: unclear etiology of current exacerbation. ?increased Afib burden.  RHC / MEMS calibration 10/11/2023: CardioMEMS data correlates to RHC.     "  Guideline Directed Medical Therapy:  --Aldosterone Antagonist: spironolactone 25 mg daily.   --SGLT2 Inhibitor: empagliflozin 10 mg daily.      Volume Management:  --Home Diuretic: Bumex 6 mg TID with metolazone 5 mg TTSa.   --Inpatient Diuretic: IV Bumex 1 mg/hr.   --K supplementation: potassium 40 mEq TID.                  Advanced Therapies:              --CardioMEMS: implanted 03/09/2022. Goal PAd of 12 mmHg.     Atrial fibrillation, parosxysmal              [id number] = 3 (HF, HTN, DM).              Anticoagulation on Eliquis.               Rate control: metoprolol succinate 50 mg daily.              Rhythm control: No.     Chronic kidney disease, stage IIIa (1.5-2.0)  Hypertension  Hyperlipidemia  Asthma, severe persistent: follows with Dr. Noonan as outpatient.   Obstructive sleep apnea  Diabetes mellitus, type II  History of gastric bypass (Samuel-en-Y) surgery   History of tobacco abuse     Vitals:   Blood pressure 125/84, pulse 78, temperature 97.8 °F (36.6 °C), temperature source Oral, resp. rate 17, height 6' 1\" (1.854 m), weight (!) 141 kg (309 lb 15.5 oz), SpO2 96%.    Body mass index is 40.9 kg/m².    I/O last 3 completed shifts:  In: 1350 [P.O.:1350]  Out: 6200 [Urine:6200]    Wt Readings from Last 10 Encounters:   04/24/25 (!) 141 kg (309 lb 15.5 oz)   04/14/25 (!) 145 kg (319 lb 3.2 oz)   04/09/25 (!) 145 kg (319 lb 6.4 oz)   03/26/25 (!) 147 kg (325 lb)   03/20/25 (!) 141 kg (309 lb 15.5 oz)   03/10/25 (!) 151 kg (333 lb 3.2 oz)   01/31/25 (!) 143 kg (315 lb)   01/09/25 (!) 143 kg (315 lb 0.6 oz)   12/04/24 (!) 147 kg (323 lb 6.4 oz)   11/28/24 (!) 143 kg (314 lb 9.6 oz)     Vitals:    04/24/25 0436 04/24/25 0716 04/24/25 0900 04/24/25 1056   BP:  128/70  125/84   BP Location:  Left arm  Right arm   Pulse:  84  78   Resp:  17  17   Temp:  97.8 °F (36.6 °C)  97.8 °F (36.6 °C)   TempSrc:  Oral  Oral   SpO2:  93% 95% 96%   Weight: (!) 141 kg (309 lb 15.5 oz)      Height:           Physical " Exam  Constitutional:       Appearance: He is obese.   Neck:      Vascular: No JVD.   Cardiovascular:      Rate and Rhythm: Normal rate and regular rhythm.      Comments: Mild NP,  improved from admission  Pulmonary:      Effort: No respiratory distress.      Breath sounds: Normal air entry. Decreased breath sounds present.   Musculoskeletal:      Right lower leg: Edema present.      Left lower leg: Edema present.   Skin:     General: Skin is warm and dry.   Neurological:      Mental Status: He is alert and oriented to person, place, and time. Mental status is at baseline.   Psychiatric:         Behavior: Behavior is cooperative.         Cognition and Memory: Cognition normal.           Current Facility-Administered Medications:     acetaminophen (TYLENOL) tablet 650 mg, 650 mg, Oral, Q6H PRN, Randy Reilly DO    albuterol (PROVENTIL HFA,VENTOLIN HFA) inhaler 2 puff, 2 puff, Inhalation, Q4H PRN, Randy Reilly DO    apixaban (ELIQUIS) tablet 5 mg, 5 mg, Oral, BID, Randy Reilly DO, 5 mg at 04/24/25 0821    atorvastatin (LIPITOR) tablet 10 mg, 10 mg, Oral, Daily, Randy Reilly, DO, 10 mg at 04/24/25 0821    budesonide-formoterol (SYMBICORT) 160-4.5 mcg/act inhaler 2 puff, 2 puff, Inhalation, BID, Randy Reilly DO, 2 puff at 04/24/25 0822    bumetanide (BUMEX) tablet 6 mg, 6 mg, Oral, TID (diuretic), RODRIGUE Sahu, 6 mg at 04/24/25 1136    cyanocobalamin (VITAMIN B-12) tablet 1,000 mcg, 1,000 mcg, Oral, Daily, Randy Reilly DO, 1,000 mcg at 04/24/25 0821    Empagliflozin (JARDIANCE) tablet 10 mg, 10 mg, Oral, Daily, Randy Reilly, DO, 10 mg at 04/24/25 0822    insulin lispro (HumALOG/ADMELOG) 100 units/mL subcutaneous injection 1-6 Units, 1-6 Units, Subcutaneous, HS, Randy Reilly DO, 2 Units at 04/23/25 2101    insulin lispro (HumALOG/ADMELOG) 100 units/mL subcutaneous injection 2-12 Units, 2-12 Units, Subcutaneous, TID AC, 6 Units at  04/24/25 1136 **AND** Fingerstick Glucose (POCT), , , TID AC, Randy Reilly DO    loratadine (CLARITIN) tablet 10 mg, 10 mg, Oral, Daily PRN, Lindsey Lofton PA-C, 10 mg at 04/23/25 0825    magnesium Oxide (MAG-OX) tablet 400 mg, 400 mg, Oral, BID, Randy Reilly DO, 400 mg at 04/24/25 0821    metoprolol succinate (TOPROL-XL) 24 hr tablet 50 mg, 50 mg, Oral, Daily, Randy Reilly DO, 50 mg at 04/24/25 0821    ondansetron (ZOFRAN) injection 4 mg, 4 mg, Intravenous, Q6H PRN, Randy Reilly DO    potassium chloride (Klor-Con M20) CR tablet 40 mEq, 40 mEq, Oral, TID With Meals, Aster García PA-C, 40 mEq at 04/24/25 1136    potassium chloride (Klor-Con M20) CR tablet 40 mEq, 40 mEq, Oral, Once, RODRIGUE Sahu    pregabalin (LYRICA) capsule 50 mg, 50 mg, Oral, HS, Randy Reilly DO, 50 mg at 04/23/25 2102    spironolactone (ALDACTONE) tablet 25 mg, 25 mg, Oral, BID, RODRIGUE Sahu    umeclidinium 62.5 mcg/actuation inhaler AEPB 1 puff, 1 puff, Inhalation, Daily, Randy Reilly DO, 1 puff at 04/24/25 0822    Labs & Results:      Results from last 7 days   Lab Units 04/21/25  0437 04/20/25  0445 04/19/25  1848   WBC Thousand/uL 6.57 6.47 7.28   HEMOGLOBIN g/dL 11.9* 11.8* 12.0   HEMATOCRIT % 37.3 36.0* 37.0   PLATELETS Thousands/uL 270 283 313         Results from last 7 days   Lab Units 04/24/25  0655 04/23/25  0217 04/22/25  0753   POTASSIUM mmol/L 3.4* 3.5 3.4*   CHLORIDE mmol/L 86* 85* 86*   CO2 mmol/L 33* 34* 34*   BUN mg/dL 52* 41* 31*   CREATININE mg/dL 2.11* 2.28* 1.88*   CALCIUM mg/dL 8.5 8.7 8.7             Thank you for the opportunity to participate in the care of this patient.    RODRIGUE Flynn  Advanced Heart Failure  [facility]

## 2025-04-24 NOTE — ASSESSMENT & PLAN NOTE
2D echo 6/2024 EF 55%  Received IV Bumex gtt., and has been transitioned to p.o. Bumex  Continue Bumex 6 mg 3 times daily per cardiology  Continue metoprolol succinate 50 mg daily, Aldactone 25 mg p.o. daily, empagliflozin 10 mg p.o. daily  Low-salt diet  Medication compliance discussed and encouraged  Monitor and replete electrolytes  Cardiology following

## 2025-04-24 NOTE — PROGRESS NOTES
Patient listed on Advanced Heart Failure Census at [facility].     Outpatient Advanced Heart Failure LCSW completed electronic chart review and rounded with Heart Failure Team. Dr. Wilcox examined patient and discussed Today's Plan.       Outpatient Social Work needs have not been identified at this time. Please enter referral if needed in the future.

## 2025-04-24 NOTE — PLAN OF CARE
Problem: PAIN - ADULT  Goal: Verbalizes/displays adequate comfort level or baseline comfort level  Description: Interventions:- Encourage patient to monitor pain and request assistance- Assess pain using appropriate pain scale- Administer analgesics based on type and severity of pain and evaluate response- Implement non-pharmacological measures as appropriate and evaluate response- Consider cultural and social influences on pain and pain management- Notify physician/advanced practitioner if interventions unsuccessful or patient reports new pain  Outcome: Progressing     Problem: INFECTION - ADULT  Goal: Absence or prevention of progression during hospitalization  Description: INTERVENTIONS:- Assess and monitor for signs and symptoms of infection- Monitor lab/diagnostic results- Monitor all insertion sites, i.e. indwelling lines, tubes, and drains- Monitor endotracheal if appropriate and nasal secretions for changes in amount and color- [place] appropriate cooling/warming therapies per order- Administer medications as ordered- Instruct and encourage patient and family to use good hand hygiene technique- Identify and instruct in appropriate isolation precautions for identified infection/condition  Outcome: Progressing  Goal: Absence of fever/infection during neutropenic period  Description: INTERVENTIONS:- Monitor WBC  Outcome: Progressing     Problem: SAFETY ADULT  Goal: Patient will remain free of falls  Description: INTERVENTIONS:- Educate patient/family on patient safety including physical limitations- Instruct patient to call for assistance with activity - Consult OT/PT to assist with strengthening/mobility - Keep Call bell within reach- Keep bed low and locked with side rails adjusted as appropriate- Keep care items and personal belongings within reach- Initiate and maintain comfort rounds- Make Fall Risk Sign visible to staff- Offer Toileting every  Hours, in advance of need- Initiate/Maintain alarm- Obtain  necessary fall risk management equipment: - Apply yellow socks and bracelet for high fall risk patients- Consider moving patient to room near nurses station  Outcome: Progressing  Goal: Maintain or return to baseline ADL function  Description: INTERVENTIONS:-  Assess patient's ability to carry out ADLs; assess patient's baseline for ADL function and identify physical deficits which impact ability to perform ADLs (bathing, care of mouth/teeth, toileting, grooming, dressing, etc.)- Assess/evaluate cause of self-care deficits - Assess range of motion- Assess patient's mobility; develop plan if impaired- Assess patient's need for assistive devices and provide as appropriate- Encourage maximum independence but intervene and supervise when necessary- Involve family in performance of ADLs- Assess for home care needs following discharge - Consider OT consult to assist with ADL evaluation and planning for discharge- Provide patient education as appropriate  Outcome: Progressing  Goal: Maintains/Returns to pre admission functional level  Description: INTERVENTIONS:- Perform AM-PAC 6 Click Basic Mobility/ Daily Activity assessment daily.- Set and communicate daily mobility goal to care team and patient/family/caregiver. - Collaborate with rehabilitation services on mobility goals if consulted- Perform Range of Motion  times a day.- Reposition patient every  hours.- Dangle patient  times a day- Stand patient  times a day- Ambulate patient times a day- Out of bed to chair  times a day - Out of bed for meals  times a day- Out of bed for toileting- Record patient progress and toleration of activity level   Outcome: Progressing     Problem: DISCHARGE PLANNING  Goal: Discharge to home or other facility with appropriate resources  Description: INTERVENTIONS:- Identify barriers to discharge w/patient and caregiver- Arrange for needed discharge resources and transportation as appropriate- Identify discharge learning needs (meds, wound  care, etc.)- Arrange for interpretive services to assist at discharge as needed- Refer to Case Management Department for coordinating discharge planning if the patient needs post-hospital services based on physician/advanced practitioner order or complex needs related to functional status, cognitive ability, or social support system  Outcome: Progressing     Problem: Knowledge Deficit  Goal: Patient/family/caregiver demonstrates understanding of disease process, treatment plan, medications, and discharge instructions  Description: Complete learning assessment and assess knowledge base.Interventions:- Provide teaching at level of understanding- Provide teaching via preferred learning methods  Outcome: Progressing

## 2025-04-24 NOTE — PLAN OF CARE
Problem: PAIN - ADULT  Goal: Verbalizes/displays adequate comfort level or baseline comfort level  Description: Interventions:- Encourage patient to monitor pain and request assistance- Assess pain using appropriate pain scale- Administer analgesics based on type and severity of pain and evaluate response- Implement non-pharmacological measures as appropriate and evaluate response- Consider cultural and social influences on pain and pain management- Notify physician/advanced practitioner if interventions unsuccessful or patient reports new pain  Outcome: Progressing     Problem: INFECTION - ADULT  Goal: Absence or prevention of progression during hospitalization  Description: INTERVENTIONS:- Assess and monitor for signs and symptoms of infection- Monitor lab/diagnostic results- Monitor all insertion sites, i.e. indwelling lines, tubes, and drains- Monitor endotracheal if appropriate and nasal secretions for changes in amount and color- [place] appropriate cooling/warming therapies per order- Administer medications as ordered- Instruct and encourage patient and family to use good hand hygiene technique- Identify and instruct in appropriate isolation precautions for identified infection/condition  Outcome: Progressing  Goal: Absence of fever/infection during neutropenic period  Description: INTERVENTIONS:- Monitor WBC  Outcome: Progressing     Problem: DISCHARGE PLANNING  Goal: Discharge to home or other facility with appropriate resources  Description: INTERVENTIONS:- Identify barriers to discharge w/patient and caregiver- Arrange for needed discharge resources and transportation as appropriate- Identify discharge learning needs (meds, wound care, etc.)- Arrange for interpretive services to assist at discharge as needed- Refer to Case Management Department for coordinating discharge planning if the patient needs post-hospital services based on physician/advanced practitioner order or complex needs related to  functional status, cognitive ability, or social support system  Outcome: Progressing     Problem: Knowledge Deficit  Goal: Patient/family/caregiver demonstrates understanding of disease process, treatment plan, medications, and discharge instructions  Description: Complete learning assessment and assess knowledge base.Interventions:- Provide teaching at level of understanding- Provide teaching via preferred learning methods  Outcome: Progressing

## 2025-04-24 NOTE — ASSESSMENT & PLAN NOTE
A1c on 3/10/2025 - 6.3  Continue empagliflozin  Sitagliptin on hold  Consider metformin at discharge  Monitor Accu-Cheks  Avoid hypoglycemia

## 2025-04-24 NOTE — PROGRESS NOTES
Progress Note - Hospitalist   Name: Mesfin Cano 59 y.o. male I MRN: [id number]  Unit/Bed#: [facility] 520-01 I Date of Admission: 4/19/2025   Date of Service: 4/24/2025 I Hospital Day: 5     Assessment & Plan  Acute on chronic diastolic CHF (congestive heart failure) ([facility])  2D echo 6/2024 EF 55%  Received IV Bumex gtt., and has been transitioned to p.o. Bumex  Continue Bumex 6 mg 3 times daily per cardiology  Continue metoprolol succinate 50 mg daily, Aldactone 25 mg p.o. daily, empagliflozin 10 mg p.o. daily  Low-salt diet  Medication compliance discussed and encouraged  Monitor and replete electrolytes  Cardiology following  Stage 3 chronic kidney disease ([facility])  Monitor kidney function  Avoid nephrotoxins  Primary hypertension  Blood pressures reviewed, acceptable  Avoid hypotension  Monitor blood pressures  Morbid obesity ([facility])  Therapeutic lifestyle modification encouraged  Outpatient sleep study discussed and strongly encouraged  Paroxysmal atrial fibrillation ([facility])  Continue metoprolol succinate 50 mg p.o. daily  Eliquis for anticoagulation  Type 2 diabetes mellitus without complication, without long-term current use of insulin ([facility])  A1c on 3/10/2025 - 6.3  Continue empagliflozin  Sitagliptin on hold  Consider metformin at discharge  Monitor Accu-Cheks  Avoid hypoglycemia              VTE Pharmacologic Prophylaxis: VTE Score: 4 Moderate Risk (Score 3-4) - Pharmacological DVT Prophylaxis Ordered: apixaban (Eliquis).    Mobility:   Basic Mobility Inpatient Raw Score: 24  JH-HLM Goal: 8: Walk 250 feet or more  JH-HLM Achieved: 8: Walk 250 feet ot more  JH-HLM Goal achieved. Continue to encourage appropriate mobility.    Patient Centered Rounds: I performed bedside rounds with nursing staff today.   Discussions with Specialists or Other Care Team Provider: Case management    Education and Discussions with Family / Patient:  Discussed with the patient, called left a message for spouse Karina.     Current Length of  Stay: 5 day(s)  Current Patient Status: Inpatient   Certification Statement: The patient will continue to require additional inpatient hospital stay due to as outlined  Discharge Plan:  Awaiting cardiology inputs for disposition planning    Code Status: Level 1 - Full Code    Subjective     Comfortably sitting up in bed  Reports feeling better  Reports improved lower extremity swelling  Encouraged out of bed and ambulation as able    Objective :  Temp:  [97.5 °F (36.4 °C)-97.9 °F (36.6 °C)] 97.8 °F (36.6 °C)  HR:  [76-90] 78  BP: (115-128)/(68-84) 125/84  Resp:  [17-18] 17  SpO2:  [91 %-98 %] 96 %  O2 Device: None (Room air)    Body mass index is 40.9 kg/m².     Input and Output Summary (last 24 hours):     Intake/Output Summary (Last 24 hours) at 4/24/2025 1256  Last data filed at 4/24/2025 1233  Gross per 24 hour   Intake 1470 ml   Output 3800 ml   Net -2330 ml       Physical Exam    Sitting up in bed  Obese  Short thick neck  Lungs diminished breath sounds  Heart sounds S1-S2 noted  Heart sounds are distant  Abdomen soft nontender  Awake follows commands  Pedal edema improved  No rash    Lines/Drains:              Lab Results: I have reviewed the following results:   Results from last 7 days   Lab Units 04/21/25  0437 04/20/25  0445 04/19/25  1848   WBC Thousand/uL 6.57   < > 7.28   HEMOGLOBIN g/dL 11.9*   < > 12.0   HEMATOCRIT % 37.3   < > 37.0   PLATELETS Thousands/uL 270   < > 313   SEGS PCT %  --   --  66   LYMPHO PCT %  --   --  17   MONO PCT %  --   --  8   EOS PCT %  --   --  7*    < > = values in this interval not displayed.     Results from last 7 days   Lab Units 04/24/25  0655   SODIUM mmol/L 133*   POTASSIUM mmol/L 3.4*   CHLORIDE mmol/L 86*   CO2 mmol/L 33*   BUN mg/dL 52*   CREATININE mg/dL 2.11*   ANION GAP mmol/L 14*   CALCIUM mg/dL 8.5   GLUCOSE RANDOM mg/dL 218*         Results from last 7 days   Lab Units 04/24/25  1054 04/24/25  0633 04/23/25  2048 04/23/25  1617 04/23/25  1034 04/23/25  0545  04/22/25  2054 04/22/25  1533 04/22/25  1030 04/22/25  0635 04/21/25  2044 04/21/25  1530   POC GLUCOSE mg/dl 258* 234* 219* 194* 277* 197* 256* 185* 197* 211* 192* 179*               Recent Cultures (last 7 days):         Imaging Results Review: I personally reviewed the following image studies/reports in PACS and discussed pertinent findings with Radiology: chest xray. My interpretation of the radiology images/reports is: Lab results reviewed.      Last 24 Hours Medication List:     Current Facility-Administered Medications:     acetaminophen (TYLENOL) tablet 650 mg, Q6H PRN    albuterol (PROVENTIL HFA,VENTOLIN HFA) inhaler 2 puff, Q4H PRN    apixaban (ELIQUIS) tablet 5 mg, BID    atorvastatin (LIPITOR) tablet 10 mg, Daily    budesonide-formoterol (SYMBICORT) 160-4.5 mcg/act inhaler 2 puff, BID    bumetanide (BUMEX) tablet 6 mg, TID (diuretic)    cyanocobalamin (VITAMIN B-12) tablet 1,000 mcg, Daily    Empagliflozin (JARDIANCE) tablet 10 mg, Daily    insulin lispro (HumALOG/ADMELOG) 100 units/mL subcutaneous injection 1-6 Units, HS    insulin lispro (HumALOG/ADMELOG) 100 units/mL subcutaneous injection 2-12 Units, TID AC **AND** Fingerstick Glucose (POCT), TID AC    loratadine (CLARITIN) tablet 10 mg, Daily PRN    magnesium Oxide (MAG-OX) tablet 400 mg, BID    metoprolol succinate (TOPROL-XL) 24 hr tablet 50 mg, Daily    ondansetron (ZOFRAN) injection 4 mg, Q6H PRN    potassium chloride (Klor-Con M20) CR tablet 40 mEq, TID With Meals    potassium chloride (Klor-Con M20) CR tablet 40 mEq, Once    pregabalin (LYRICA) capsule 50 mg, HS    spironolactone (ALDACTONE) tablet 25 mg, BID    umeclidinium 62.5 mcg/actuation inhaler AEPB 1 puff, Daily    Administrative Statements   Today, Patient Was Seen By: Arpan Coppola MD  I have spent a total time of 31 minutes in caring for this patient on the day of the visit/encounter including Diagnostic results, Risks and benefits of tx options, Instructions for  management, Patient and family education, Importance of tx compliance, Risk factor reductions, Impressions, Counseling / Coordination of care, Documenting in the medical record, Reviewing/placing orders in the medical record (including tests, medications, and/or procedures), Obtaining or reviewing history  , and Communicating with other healthcare professionals .    **Please Note: This note may have been constructed using a voice recognition system.**

## 2025-04-25 LAB
ANION GAP SERPL CALCULATED.3IONS-SCNC: 14 MMOL/L (ref 4–13)
BUN SERPL-MCNC: 56 MG/DL (ref 5–25)
CALCIUM SERPL-MCNC: 8.7 MG/DL (ref 8.4–10.2)
CHLORIDE SERPL-SCNC: 88 MMOL/L (ref 96–108)
CO2 SERPL-SCNC: 33 MMOL/L (ref 21–32)
CREAT SERPL-MCNC: 2.37 MG/DL (ref 0.6–1.3)
GFR SERPL CREATININE-BSD FRML MDRD: 29 ML/MIN/1.73SQ M
GLUCOSE SERPL-MCNC: 190 MG/DL (ref 65–140)
GLUCOSE SERPL-MCNC: 200 MG/DL (ref 65–140)
GLUCOSE SERPL-MCNC: 201 MG/DL (ref 65–140)
GLUCOSE SERPL-MCNC: 254 MG/DL (ref 65–140)
GLUCOSE SERPL-MCNC: 281 MG/DL (ref 65–140)
POTASSIUM SERPL-SCNC: 3.7 MMOL/L (ref 3.5–5.3)
SODIUM SERPL-SCNC: 135 MMOL/L (ref 135–147)

## 2025-04-25 PROCEDURE — 80048 BASIC METABOLIC PNL TOTAL CA: CPT

## 2025-04-25 PROCEDURE — 99232 SBSQ HOSP IP/OBS MODERATE 35: CPT | Performed by: INTERNAL MEDICINE

## 2025-04-25 PROCEDURE — 82948 REAGENT STRIP/BLOOD GLUCOSE: CPT

## 2025-04-25 RX ORDER — POTASSIUM CHLORIDE 1500 MG/1
40 TABLET, EXTENDED RELEASE ORAL ONCE
Status: COMPLETED | OUTPATIENT
Start: 2025-04-25 | End: 2025-04-25

## 2025-04-25 RX ADMIN — INSULIN LISPRO 6 UNITS: 100 INJECTION, SOLUTION INTRAVENOUS; SUBCUTANEOUS at 11:32

## 2025-04-25 RX ADMIN — ATORVASTATIN CALCIUM 10 MG: 10 TABLET, FILM COATED ORAL at 08:19

## 2025-04-25 RX ADMIN — BUMETANIDE 6 MG: 2 TABLET ORAL at 11:29

## 2025-04-25 RX ADMIN — INSULIN LISPRO 3 UNITS: 100 INJECTION, SOLUTION INTRAVENOUS; SUBCUTANEOUS at 21:55

## 2025-04-25 RX ADMIN — MAGNESIUM OXIDE TAB 400 MG (241.3 MG ELEMENTAL MG) 400 MG: 400 (241.3 MG) TAB at 17:11

## 2025-04-25 RX ADMIN — POTASSIUM CHLORIDE 40 MEQ: 1500 TABLET, EXTENDED RELEASE ORAL at 11:29

## 2025-04-25 RX ADMIN — MAGNESIUM OXIDE TAB 400 MG (241.3 MG ELEMENTAL MG) 400 MG: 400 (241.3 MG) TAB at 08:19

## 2025-04-25 RX ADMIN — UMECLIDINIUM 1 PUFF: 62.5 AEROSOL, POWDER ORAL at 08:22

## 2025-04-25 RX ADMIN — BUDESONIDE AND FORMOTEROL FUMARATE DIHYDRATE 2 PUFF: 160; 4.5 AEROSOL RESPIRATORY (INHALATION) at 17:13

## 2025-04-25 RX ADMIN — SPIRONOLACTONE 25 MG: 25 TABLET, FILM COATED ORAL at 08:19

## 2025-04-25 RX ADMIN — BUMETANIDE 6 MG: 2 TABLET ORAL at 17:11

## 2025-04-25 RX ADMIN — METOPROLOL SUCCINATE 50 MG: 50 TABLET, EXTENDED RELEASE ORAL at 08:19

## 2025-04-25 RX ADMIN — EMPAGLIFLOZIN 10 MG: 10 TABLET, FILM COATED ORAL at 08:22

## 2025-04-25 RX ADMIN — INSULIN LISPRO 4 UNITS: 100 INJECTION, SOLUTION INTRAVENOUS; SUBCUTANEOUS at 07:31

## 2025-04-25 RX ADMIN — SPIRONOLACTONE 25 MG: 25 TABLET, FILM COATED ORAL at 17:10

## 2025-04-25 RX ADMIN — INSULIN LISPRO 4 UNITS: 100 INJECTION, SOLUTION INTRAVENOUS; SUBCUTANEOUS at 17:13

## 2025-04-25 RX ADMIN — APIXABAN 5 MG: 5 TABLET, FILM COATED ORAL at 08:19

## 2025-04-25 RX ADMIN — BUDESONIDE AND FORMOTEROL FUMARATE DIHYDRATE 2 PUFF: 160; 4.5 AEROSOL RESPIRATORY (INHALATION) at 08:22

## 2025-04-25 RX ADMIN — APIXABAN 5 MG: 5 TABLET, FILM COATED ORAL at 17:11

## 2025-04-25 RX ADMIN — CYANOCOBALAMIN TAB 500 MCG 1000 MCG: 500 TAB at 08:19

## 2025-04-25 RX ADMIN — PREGABALIN 50 MG: 50 CAPSULE ORAL at 21:55

## 2025-04-25 RX ADMIN — POTASSIUM CHLORIDE 40 MEQ: 1500 TABLET, EXTENDED RELEASE ORAL at 07:27

## 2025-04-25 RX ADMIN — BUMETANIDE 6 MG: 2 TABLET ORAL at 05:15

## 2025-04-25 RX ADMIN — POTASSIUM CHLORIDE 40 MEQ: 1500 TABLET, EXTENDED RELEASE ORAL at 17:11

## 2025-04-25 RX ADMIN — POTASSIUM CHLORIDE 40 MEQ: 1500 TABLET, EXTENDED RELEASE ORAL at 21:54

## 2025-04-25 NOTE — NURSING NOTE
Pt reports he slept well throughout the night. Denies pain during shift. Reports mild dyspnea with exertion, but otherwise offers no complaints. +1/+2 edema to BLLE- pt reports is his baseline. Compliant with 1800 mL daily fluid restriction. VSS.

## 2025-04-25 NOTE — PROGRESS NOTES
Progress Note - Hospitalist   Name: Mesfin Cano 59 y.o. male I MRN: [id number]  Unit/Bed#: [facility] 520-01 I Date of Admission: 4/19/2025   Date of Service: 4/25/2025 I Hospital Day: 6     Assessment & Plan  Acute on chronic diastolic CHF (congestive heart failure) ([facility])  2D echo 6/2024 EF 55%  Received IV Bumex gtt., and has been transitioned to p.o. Bumex  Presently on Bumex 6 mg 3 times daily per cardiology  Continue metoprolol succinate 50 mg daily, Aldactone 25 mg p.o. daily, empagliflozin 10 mg p.o. daily  Monitor kidney function  Medication compliance discussed and encouraged  Low-salt diet  Cardiology following  Monitor and replete electrolytes    Stage 3 chronic kidney disease ([facility])  Avoid nephrotoxins  Monitor kidney function  Primary hypertension  Blood pressures reviewed, acceptable  Monitor blood pressures  Avoid hypotension  Morbid obesity ([facility])  Therapeutic lifestyle modification encouraged  Outpatient sleep study discussed and strongly encouraged  Paroxysmal atrial fibrillation ([facility])  Continue metoprolol succinate 50 mg p.o. daily  Eliquis for anticoagulation  Type 2 diabetes mellitus without complication, without long-term current use of insulin ([facility])  A1c on 3/10/2025 - 6.3  Continue empagliflozin  Sitagliptin on hold while inpatient  Patient reports he trialed metformin for a week but discontinued due to GI upset and intolerance  Outpatient endocrinology follow-up recommended, contact information provided in the show instructions  Monitor Accu-Cheks  Avoid hypoglycemia                VTE Pharmacologic Prophylaxis: VTE Score: 4 Moderate Risk (Score 3-4) - Pharmacological DVT Prophylaxis Ordered: apixaban (Eliquis).    Mobility:   Basic Mobility Inpatient Raw Score: 24  JH-HLM Goal: 8: Walk 250 feet or more  JH-HLM Achieved: 6: Walk 10 steps or more  JH-HLM Goal achieved. Continue to encourage appropriate mobility.    Patient Centered Rounds: I performed bedside rounds with nursing staff today.    Discussions with Specialists or Other Care Team Provider: Case management    Education and Discussions with Family / Patient:  discussed with the patient, called left a message for spouse Karina.     Current Length of Stay: 6 day(s)  Current Patient Status: Inpatient   Certification Statement: The patient will continue to require additional inpatient hospital stay due to as outlined  Discharge Plan:  Awaiting cardiology inputs for disposition planning    Code Status: Level 1 - Full Code    Subjective     Comfortably sitting up in bed  Reports feeling okay  Denies chest pain shortness of breath  Encourage ambulation  Encourage incentive spirometry  Discussed with RN      Objective :  Temp:  [97.4 °F (36.3 °C)-97.5 °F (36.4 °C)] 97.4 °F (36.3 °C)  HR:  [76-88] 80  BP: (106-155)/(56-98) 148/98  Resp:  [17-20] 20  SpO2:  [91 %-96 %] 94 %  O2 Device: None (Room air)    Body mass index is 40.78 kg/m².     Input and Output Summary (last 24 hours):     Intake/Output Summary (Last 24 hours) at 4/25/2025 1324  Last data filed at 4/25/2025 1258  Gross per 24 hour   Intake 540 ml   Output 3475 ml   Net -2935 ml       Physical Exam      Comfortably in bed  Obese  Short thick neck  Lungs diminished breath sounds  Heart sounds S1-S2 noted  Heart sounds are distant  Abdomen soft nontender  Abdominal obesity noted  Awake follows commands  No pedal edema  No rash    Lines/Drains:              Lab Results: I have reviewed the following results:   Results from last 7 days   Lab Units 04/21/25  0437 04/20/25  0445 04/19/25  1848   WBC Thousand/uL 6.57   < > 7.28   HEMOGLOBIN g/dL 11.9*   < > 12.0   HEMATOCRIT % 37.3   < > 37.0   PLATELETS Thousands/uL 270   < > 313   SEGS PCT %  --   --  66   LYMPHO PCT %  --   --  17   MONO PCT %  --   --  8   EOS PCT %  --   --  7*    < > = values in this interval not displayed.     Results from last 7 days   Lab Units 04/25/25  0439   SODIUM mmol/L 135   POTASSIUM mmol/L 3.7   CHLORIDE mmol/L 88*    CO2 mmol/L 33*   BUN mg/dL 56*   CREATININE mg/dL 2.37*   ANION GAP mmol/L 14*   CALCIUM mg/dL 8.7   GLUCOSE RANDOM mg/dL 190*         Results from last 7 days   Lab Units 04/25/25  1036 04/25/25  0557 04/24/25 2028 04/24/25  1518 04/24/25  1054 04/24/25  0633 04/23/25  2048 04/23/25  1617 04/23/25  1034 04/23/25  0545 04/22/25  2054 04/22/25  1533   POC GLUCOSE mg/dl 281* 201* 234* 197* 258* 234* 219* 194* 277* 197* 256* 185*               Recent Cultures (last 7 days):         Imaging Results Review: I personally reviewed the following image studies/reports in PACS and discussed pertinent findings with Radiology: chest xray. My interpretation of the radiology images/reports is: Lab results reviewed.      Last 24 Hours Medication List:     Current Facility-Administered Medications:     acetaminophen (TYLENOL) tablet 650 mg, Q6H PRN    albuterol (PROVENTIL HFA,VENTOLIN HFA) inhaler 2 puff, Q4H PRN    apixaban (ELIQUIS) tablet 5 mg, BID    atorvastatin (LIPITOR) tablet 10 mg, Daily    budesonide-formoterol (SYMBICORT) 160-4.5 mcg/act inhaler 2 puff, BID    bumetanide (BUMEX) tablet 6 mg, TID (diuretic)    cyanocobalamin (VITAMIN B-12) tablet 1,000 mcg, Daily    Empagliflozin (JARDIANCE) tablet 10 mg, Daily    insulin lispro (HumALOG/ADMELOG) 100 units/mL subcutaneous injection 1-6 Units, HS    insulin lispro (HumALOG/ADMELOG) 100 units/mL subcutaneous injection 2-12 Units, TID AC **AND** Fingerstick Glucose (POCT), TID AC    loratadine (CLARITIN) tablet 10 mg, Daily PRN    magnesium Oxide (MAG-OX) tablet 400 mg, BID    metoprolol succinate (TOPROL-XL) 24 hr tablet 50 mg, Daily    ondansetron (ZOFRAN) injection 4 mg, Q6H PRN    potassium chloride (Klor-Con M20) CR tablet 40 mEq, TID With Meals    pregabalin (LYRICA) capsule 50 mg, HS    spironolactone (ALDACTONE) tablet 25 mg, BID    umeclidinium 62.5 mcg/actuation inhaler AEPB 1 puff, Daily    Administrative Statements   Today, Patient Was Seen By: Arpan VASQUEZ  MD [place]  I have spent a total time of 32 minutes in caring for this patient on the day of the visit/encounter including Diagnostic results, Risks and benefits of tx options, Instructions for management, Patient and family education, Importance of tx compliance, Risk factor reductions, Impressions, Counseling / Coordination of care, Documenting in the medical record, Reviewing/placing orders in the medical record (including tests, medications, and/or procedures), Obtaining or reviewing history  , and Communicating with other healthcare professionals .    **Please Note: This note may have been constructed using a voice recognition system.**

## 2025-04-25 NOTE — PROGRESS NOTES
Advanced Heart Failure / Pulmonary Hypertension Service Progress Note    Mesfin Cano 59 y.o. male   MRN: [id number]  Unit/Bed#: [facility] 520-01; Encounter: [id number]    Assessment:  Principal Problem:    Acute on chronic diastolic CHF (congestive heart failure) (HCC)  Active Problems:    Primary hypertension    Morbid obesity (HCC)    Paroxysmal atrial fibrillation (HCC)    Stage 3 chronic kidney disease (HCC)    Presence of CardioMEMS HF system    Type 2 diabetes mellitus without complication, without long-term current use of insulin (HCC)    Mesfin Cano is a 59-year-old man with PMH as below who presented to [facility] on 04/19/2025 (7th admission in past 12 months) with reported 15 lbs weight gain in past 48 hours. Reported feeling well on 04/18; weight on home scale of 314 lbs. Then on 04/19, noted weight of 327 lbs in the afternoon with associated TELLES and bilateral LE swelling. Denies lightheadedness, chest pain, wheezing, SOB at rest, and orthopnea. Reports less or urine output with diuretics prior to admit    Subjective:   Patient seen and examined. No significant events overnight. Feeling well overall, but prefers to stay one more night to monitor kidney function    Objective:   Intake/ Output: 1020/ 2875 (-1.8)  Weight: admit 323-->321--313--312 lbs--309 lbs--309 (same, )   Telemetry: non tele    Today's Plan:  Continue Bumex 6 TID.  Volume status improved. Weights at BL, TELLES improving, JVD down, Creatinine stable but above BL with borderline Potassium levels.  Will try to add Metolazone tomorrow if hypokalemia improves, w/goal to DC home Sunday    Continue Toprol XL 50 Qd, Jardiance 10 Qd, Spironolactone 25 BID.      Repeat BMP in a.m.    Plan:  Acute on chronic HFpEF; LVEF 55%              Impression: unclear etiology of current exacerbation. ?increased Afib burden.  RHC / MEMS calibration 10/11/2023: CardioMEMS data correlates to RHC.      Guideline Directed Medical  "Therapy:  --Aldosterone Antagonist: spironolactone 25 mg daily.   --SGLT2 Inhibitor: empagliflozin 10 mg daily.      Volume Management:  --Home Diuretic: Bumex 6 mg TID with metolazone 5 mg TTSa.   --Inpatient Diuretic: IV Bumex 1 mg/hr.   --K supplementation: potassium 40 mEq TID.                  Advanced Therapies:              --CardioMEMS: implanted 03/09/2022. Goal PAd of 12 mmHg.     Atrial fibrillation, parosxysmal              [id number] = 3 (HF, HTN, DM).              Anticoagulation on Eliquis.               Rate control: metoprolol succinate 50 mg daily.              Rhythm control: No.     Chronic kidney disease, stage IIIa (1.5-2.0)   On admit 1.45--1.42--1.69--1.69--1.88--2.28--2.11--2.3 today    Hypertension  Hyperlipidemia  Asthma, severe persistent: follows with Dr. Noonan as outpatient.   Obstructive sleep apnea  Diabetes mellitus, type II  History of gastric bypass (Samuel-en-Y) surgery   History of tobacco abuse      Vitals:   Blood pressure 155/84, pulse 76, temperature (!) 97.4 °F (36.3 °C), resp. rate 20, height 6' 1\" (1.854 m), weight (!) 140 kg (309 lb 1.4 oz), SpO2 95%.    Body mass index is 40.78 kg/m².    I/O last 3 completed shifts:  In: 1020 [P.O.:1020]  Out: 4675 [Urine:4675]    Wt Readings from Last 10 Encounters:   04/25/25 (!) 140 kg (309 lb 1.4 oz)   04/14/25 (!) 145 kg (319 lb 3.2 oz)   04/09/25 (!) 145 kg (319 lb 6.4 oz)   03/26/25 (!) 147 kg (325 lb)   03/20/25 (!) 141 kg (309 lb 15.5 oz)   03/10/25 (!) 151 kg (333 lb 3.2 oz)   01/31/25 (!) 143 kg (315 lb)   01/09/25 (!) 143 kg (315 lb 0.6 oz)   12/04/24 (!) 147 kg (323 lb 6.4 oz)   11/28/24 (!) 143 kg (314 lb 9.6 oz)     Vitals:    04/24/25 2238 04/24/25 2300 04/25/25 0600 04/25/25 0716   BP: 106/56   155/84   BP Location: Left arm      Pulse: 80   76   Resp: 20      Temp: 97.5 °F (36.4 °C)   (!) 97.4 °F (36.3 °C)   TempSrc: Oral      SpO2: 95% 91%  95%   Weight:   (!) 140 kg (309 lb 1.4 oz)    Height:           Physical " Exam  Constitutional:       Appearance: He is obese.   Neck:      Vascular: No JVD.   Cardiovascular:      Rate and Rhythm: Normal rate and regular rhythm.      Comments: Mild NP  Pulmonary:      Effort: No respiratory distress.      Breath sounds: Normal air entry. Decreased breath sounds present.   Musculoskeletal:      Right lower leg: Edema present.      Left lower leg: Edema present.   Skin:     General: Skin is warm and dry.   Neurological:      Mental Status: He is alert and oriented to person, place, and time. Mental status is at baseline.   Psychiatric:         Behavior: Behavior is cooperative.         Cognition and Memory: Cognition normal.           Current Facility-Administered Medications:     acetaminophen (TYLENOL) tablet 650 mg, 650 mg, Oral, Q6H PRN, Randy Reilly DO    albuterol (PROVENTIL HFA,VENTOLIN HFA) inhaler 2 puff, 2 puff, Inhalation, Q4H PRN, Randy Reilly DO    apixaban (ELIQUIS) tablet 5 mg, 5 mg, Oral, BID, Randy Reilly DO, 5 mg at 04/25/25 0819    atorvastatin (LIPITOR) tablet 10 mg, 10 mg, Oral, Daily, Randy Rielly DO, 10 mg at 04/25/25 0819    budesonide-formoterol (SYMBICORT) 160-4.5 mcg/act inhaler 2 puff, 2 puff, Inhalation, BID, Randy Reilly DO, 2 puff at 04/25/25 0822    bumetanide (BUMEX) tablet 6 mg, 6 mg, Oral, TID (diuretic), RODRIGUE Sahu, 6 mg at 04/25/25 0515    cyanocobalamin (VITAMIN B-12) tablet 1,000 mcg, 1,000 mcg, Oral, Daily, Randy Reilly DO, 1,000 mcg at 04/25/25 0819    Empagliflozin (JARDIANCE) tablet 10 mg, 10 mg, Oral, Daily, Randy Reilly DO, 10 mg at 04/25/25 0822    insulin lispro (HumALOG/ADMELOG) 100 units/mL subcutaneous injection 1-6 Units, 1-6 Units, Subcutaneous, HS, Randy Reilly DO, 3 Units at 04/24/25 2109    insulin lispro (HumALOG/ADMELOG) 100 units/mL subcutaneous injection 2-12 Units, 2-12 Units, Subcutaneous, TID AC, 4 Units at 04/25/25 0731 **AND**  Fingerstick Glucose (POCT), , , TID AC, Randy Reilly DO    loratadine (CLARITIN) tablet 10 mg, 10 mg, Oral, Daily PRN, Lindsey Lofton PA-C, 10 mg at 04/24/25 1723    magnesium Oxide (MAG-OX) tablet 400 mg, 400 mg, Oral, BID, Randy Reilly DO, 400 mg at 04/25/25 0819    metoprolol succinate (TOPROL-XL) 24 hr tablet 50 mg, 50 mg, Oral, Daily, Randy Reilly DO, 50 mg at 04/25/25 0819    ondansetron (ZOFRAN) injection 4 mg, 4 mg, Intravenous, Q6H PRN, Randy Reilly DO    potassium chloride (Klor-Con M20) CR tablet 40 mEq, 40 mEq, Oral, TID With Meals, Aster García PA-C, 40 mEq at 04/25/25 0727    pregabalin (LYRICA) capsule 50 mg, 50 mg, Oral, HS, Randy Reilly DO, 50 mg at 04/24/25 2105    spironolactone (ALDACTONE) tablet 25 mg, 25 mg, Oral, BID, RODRIGUE Sahu, 25 mg at 04/25/25 0819    umeclidinium 62.5 mcg/actuation inhaler AEPB 1 puff, 1 puff, Inhalation, Daily, Randy Reilly DO, 1 puff at 04/25/25 0822    Labs & Results:      Results from last 7 days   Lab Units 04/21/25  0437 04/20/25  0445 04/19/25  1848   WBC Thousand/uL 6.57 6.47 7.28   HEMOGLOBIN g/dL 11.9* 11.8* 12.0   HEMATOCRIT % 37.3 36.0* 37.0   PLATELETS Thousands/uL 270 283 313         Results from last 7 days   Lab Units 04/25/25  0439 04/24/25  0655 04/23/25  0217   POTASSIUM mmol/L 3.7 3.4* 3.5   CHLORIDE mmol/L 88* 86* 85*   CO2 mmol/L 33* 33* 34*   BUN mg/dL 56* 52* 41*   CREATININE mg/dL 2.37* 2.11* 2.28*   CALCIUM mg/dL 8.7 8.5 8.7             Thank you for the opportunity to participate in the care of this patient.    RODRIGUE Flynn  Advanced Heart Failure  [facility]

## 2025-04-25 NOTE — ASSESSMENT & PLAN NOTE
A1c on 3/10/2025 - 6.3  Continue empagliflozin  Sitagliptin on hold while inpatient  Patient reports he trialed metformin for a week but discontinued due to GI upset and intolerance  Outpatient endocrinology follow-up recommended, contact information provided in the show instructions  Monitor Accu-Cheks  Avoid hypoglycemia

## 2025-04-25 NOTE — ASSESSMENT & PLAN NOTE
2D echo 6/2024 EF 55%  Received IV Bumex gtt., and has been transitioned to p.o. Bumex  Presently on Bumex 6 mg 3 times daily per cardiology  Continue metoprolol succinate 50 mg daily, Aldactone 25 mg p.o. daily, empagliflozin 10 mg p.o. daily  Monitor kidney function  Medication compliance discussed and encouraged  Low-salt diet  Cardiology following  Monitor and replete electrolytes

## 2025-04-25 NOTE — PLAN OF CARE
Problem: PAIN - ADULT  Goal: Verbalizes/displays adequate comfort level or baseline comfort level  Description: Interventions:- Encourage patient to monitor pain and request assistance- Assess pain using appropriate pain scale- Administer analgesics based on type and severity of pain and evaluate response- Implement non-pharmacological measures as appropriate and evaluate response- Consider cultural and social influences on pain and pain management- Notify physician/advanced practitioner if interventions unsuccessful or patient reports new pain  Outcome: Progressing     Problem: INFECTION - ADULT  Goal: Absence or prevention of progression during hospitalization  Description: INTERVENTIONS:- Assess and monitor for signs and symptoms of infection- Monitor lab/diagnostic results- Monitor all insertion sites, i.e. indwelling lines, tubes, and drains- Monitor endotracheal if appropriate and nasal secretions for changes in amount and color- [place] appropriate cooling/warming therapies per order- Administer medications as ordered- Instruct and encourage patient and family to use good hand hygiene technique- Identify and instruct in appropriate isolation precautions for identified infection/condition  Outcome: Progressing  Goal: Absence of fever/infection during neutropenic period  Description: INTERVENTIONS:- Monitor WBC  Outcome: Progressing       Problem: SAFETY ADULT  Goal: Patient will remain free of falls  Description: INTERVENTIONS:- Educate patient/family on patient safety including physical limitations- Instruct patient to call for assistance with activity - Consult OT/PT to assist with strengthening/mobility - Keep Call bell within reach- Keep bed low and locked with side rails adjusted as appropriate- Keep care items and personal belongings within reach- Initiate and maintain comfort rounds- Make Fall Risk Sign visible to staff- Offer Toileting every 2 Hours, in advance of need. Obtain necessary fall risk  management equipment. Apply yellow socks and bracelet for high fall risk patients- Consider moving patient to room near nurses station  Outcome: Progressing  Goal: Maintain or return to baseline ADL function  Description: INTERVENTIONS:-  Assess patient's ability to carry out ADLs; assess patient's baseline for ADL function and identify physical deficits which impact ability to perform ADLs (bathing, care of mouth/teeth, toileting, grooming, dressing, etc.)- Assess/evaluate cause of self-care deficits - Assess range of motion- Assess patient's mobility; develop plan if impaired- Assess patient's need for assistive devices and provide as appropriate- Encourage maximum independence but intervene and supervise when necessary- Involve family in performance of ADLs- Assess for home care needs following discharge - Consider OT consult to assist with ADL evaluation and planning for discharge- Provide patient education as appropriate  Outcome: Progressing  Goal: Maintains/Returns to pre admission functional level  Description: INTERVENTIONS:- Perform AM-PAC 6 Click Basic Mobility/ Daily Activity assessment daily.- Set and communicate daily mobility goal to care team and patient/family/caregiver. - Collaborate with rehabilitation services on mobility goals if consulted- Perform Range of Motion 2 times a day.- Reposition patient every 2 hours.- Dangle patient 2 times a day- Stand patient 3 times a day- Ambulate patient 3 times a day- Out of bed to chair 3 times a day - Out of bed for meals 3 times a day- Out of bed for toileting- Record patient progress and toleration of activity level   Outcome: Progressing     Problem: DISCHARGE PLANNING  Goal: Discharge to home or other facility with appropriate resources  Description: INTERVENTIONS:- Identify barriers to discharge w/patient and caregiver- Arrange for needed discharge resources and transportation as appropriate- Identify discharge learning needs (meds, wound care, etc.)-  Arrange for interpretive services to assist at discharge as needed- Refer to Case Management Department for coordinating discharge planning if the patient needs post-hospital services based on physician/advanced practitioner order or complex needs related to functional status, cognitive ability, or social support system  Outcome: Progressing     Problem: Knowledge Deficit  Goal: Patient/family/caregiver demonstrates understanding of disease process, treatment plan, medications, and discharge instructions  Description: Complete learning assessment and assess knowledge base.Interventions:- Provide teaching at level of understanding- Provide teaching via preferred learning methods  Outcome: Progressing

## 2025-04-26 LAB
ANION GAP SERPL CALCULATED.3IONS-SCNC: 14 MMOL/L (ref 4–13)
BUN SERPL-MCNC: 57 MG/DL (ref 5–25)
CALCIUM SERPL-MCNC: 8.7 MG/DL (ref 8.4–10.2)
CHLORIDE SERPL-SCNC: 89 MMOL/L (ref 96–108)
CO2 SERPL-SCNC: 30 MMOL/L (ref 21–32)
CREAT SERPL-MCNC: 2.15 MG/DL (ref 0.6–1.3)
GFR SERPL CREATININE-BSD FRML MDRD: 32 ML/MIN/1.73SQ M
GLUCOSE SERPL-MCNC: 170 MG/DL (ref 65–140)
GLUCOSE SERPL-MCNC: 213 MG/DL (ref 65–140)
GLUCOSE SERPL-MCNC: 255 MG/DL (ref 65–140)
GLUCOSE SERPL-MCNC: 306 MG/DL (ref 65–140)
GLUCOSE SERPL-MCNC: 317 MG/DL (ref 65–140)
MAGNESIUM SERPL-MCNC: 2.7 MG/DL (ref 1.9–2.7)
POTASSIUM SERPL-SCNC: 3.9 MMOL/L (ref 3.5–5.3)
SODIUM SERPL-SCNC: 133 MMOL/L (ref 135–147)

## 2025-04-26 PROCEDURE — 80048 BASIC METABOLIC PNL TOTAL CA: CPT

## 2025-04-26 PROCEDURE — 99232 SBSQ HOSP IP/OBS MODERATE 35: CPT | Performed by: INTERNAL MEDICINE

## 2025-04-26 PROCEDURE — 99232 SBSQ HOSP IP/OBS MODERATE 35: CPT

## 2025-04-26 PROCEDURE — 83735 ASSAY OF MAGNESIUM: CPT

## 2025-04-26 PROCEDURE — 82948 REAGENT STRIP/BLOOD GLUCOSE: CPT

## 2025-04-26 RX ORDER — METOLAZONE 5 MG/1
5 TABLET ORAL DAILY
Status: DISCONTINUED | OUTPATIENT
Start: 2025-04-26 | End: 2025-04-27 | Stop reason: HOSPADM

## 2025-04-26 RX ORDER — INSULIN GLARGINE 100 [IU]/ML
15 INJECTION, SOLUTION SUBCUTANEOUS
Status: DISCONTINUED | OUTPATIENT
Start: 2025-04-26 | End: 2025-04-27 | Stop reason: HOSPADM

## 2025-04-26 RX ORDER — POTASSIUM CHLORIDE 1500 MG/1
40 TABLET, EXTENDED RELEASE ORAL ONCE
Status: COMPLETED | OUTPATIENT
Start: 2025-04-26 | End: 2025-04-26

## 2025-04-26 RX ORDER — INSULIN LISPRO 100 [IU]/ML
3 INJECTION, SOLUTION INTRAVENOUS; SUBCUTANEOUS
Status: DISCONTINUED | OUTPATIENT
Start: 2025-04-26 | End: 2025-04-27 | Stop reason: HOSPADM

## 2025-04-26 RX ADMIN — MAGNESIUM OXIDE TAB 400 MG (241.3 MG ELEMENTAL MG) 400 MG: 400 (241.3 MG) TAB at 17:08

## 2025-04-26 RX ADMIN — MAGNESIUM OXIDE TAB 400 MG (241.3 MG ELEMENTAL MG) 400 MG: 400 (241.3 MG) TAB at 09:18

## 2025-04-26 RX ADMIN — SPIRONOLACTONE 25 MG: 25 TABLET, FILM COATED ORAL at 09:18

## 2025-04-26 RX ADMIN — SPIRONOLACTONE 25 MG: 25 TABLET, FILM COATED ORAL at 17:08

## 2025-04-26 RX ADMIN — BUMETANIDE 6 MG: 2 TABLET ORAL at 11:35

## 2025-04-26 RX ADMIN — PREGABALIN 50 MG: 50 CAPSULE ORAL at 21:01

## 2025-04-26 RX ADMIN — BUDESONIDE AND FORMOTEROL FUMARATE DIHYDRATE 2 PUFF: 160; 4.5 AEROSOL RESPIRATORY (INHALATION) at 17:14

## 2025-04-26 RX ADMIN — INSULIN LISPRO 3 UNITS: 100 INJECTION, SOLUTION INTRAVENOUS; SUBCUTANEOUS at 13:32

## 2025-04-26 RX ADMIN — INSULIN LISPRO 2 UNITS: 100 INJECTION, SOLUTION INTRAVENOUS; SUBCUTANEOUS at 17:13

## 2025-04-26 RX ADMIN — CYANOCOBALAMIN TAB 500 MCG 1000 MCG: 500 TAB at 09:18

## 2025-04-26 RX ADMIN — METOLAZONE 5 MG: 5 TABLET ORAL at 17:13

## 2025-04-26 RX ADMIN — POTASSIUM CHLORIDE 40 MEQ: 1500 TABLET, EXTENDED RELEASE ORAL at 17:08

## 2025-04-26 RX ADMIN — POTASSIUM CHLORIDE 40 MEQ: 1500 TABLET, EXTENDED RELEASE ORAL at 21:02

## 2025-04-26 RX ADMIN — EMPAGLIFLOZIN 10 MG: 10 TABLET, FILM COATED ORAL at 09:19

## 2025-04-26 RX ADMIN — POTASSIUM CHLORIDE 40 MEQ: 1500 TABLET, EXTENDED RELEASE ORAL at 11:35

## 2025-04-26 RX ADMIN — BUMETANIDE 6 MG: 2 TABLET ORAL at 05:26

## 2025-04-26 RX ADMIN — INSULIN GLARGINE 15 UNITS: 100 INJECTION, SOLUTION SUBCUTANEOUS at 21:01

## 2025-04-26 RX ADMIN — APIXABAN 5 MG: 5 TABLET, FILM COATED ORAL at 17:08

## 2025-04-26 RX ADMIN — INSULIN LISPRO 4 UNITS: 100 INJECTION, SOLUTION INTRAVENOUS; SUBCUTANEOUS at 21:05

## 2025-04-26 RX ADMIN — UMECLIDINIUM 1 PUFF: 62.5 AEROSOL, POWDER ORAL at 09:19

## 2025-04-26 RX ADMIN — POTASSIUM CHLORIDE 40 MEQ: 1500 TABLET, EXTENDED RELEASE ORAL at 09:18

## 2025-04-26 RX ADMIN — INSULIN LISPRO 6 UNITS: 100 INJECTION, SOLUTION INTRAVENOUS; SUBCUTANEOUS at 09:18

## 2025-04-26 RX ADMIN — METOPROLOL SUCCINATE 50 MG: 50 TABLET, EXTENDED RELEASE ORAL at 09:18

## 2025-04-26 RX ADMIN — INSULIN LISPRO 8 UNITS: 100 INJECTION, SOLUTION INTRAVENOUS; SUBCUTANEOUS at 11:36

## 2025-04-26 RX ADMIN — INSULIN LISPRO 3 UNITS: 100 INJECTION, SOLUTION INTRAVENOUS; SUBCUTANEOUS at 17:14

## 2025-04-26 RX ADMIN — BUDESONIDE AND FORMOTEROL FUMARATE DIHYDRATE 2 PUFF: 160; 4.5 AEROSOL RESPIRATORY (INHALATION) at 09:19

## 2025-04-26 RX ADMIN — APIXABAN 5 MG: 5 TABLET, FILM COATED ORAL at 09:18

## 2025-04-26 RX ADMIN — ATORVASTATIN CALCIUM 10 MG: 10 TABLET, FILM COATED ORAL at 09:18

## 2025-04-26 RX ADMIN — UMECLIDINIUM 1 PUFF: 62.5 AEROSOL, POWDER ORAL at 09:25

## 2025-04-26 RX ADMIN — BUMETANIDE 6 MG: 2 TABLET ORAL at 17:08

## 2025-04-26 NOTE — PLAN OF CARE
Problem: INFECTION - ADULT  Goal: Absence or prevention of progression during hospitalization  Description: INTERVENTIONS:- Assess and monitor for signs and symptoms of infection- Monitor lab/diagnostic results- Monitor all insertion sites, i.e. indwelling lines, tubes, and drains- Monitor endotracheal if appropriate and nasal secretions for changes in amount and color- [place] appropriate cooling/warming therapies per order- Administer medications as ordered- Instruct and encourage patient and family to use good hand hygiene technique- Identify and instruct in appropriate isolation precautions for identified infection/condition  Outcome: Progressing  Goal: Absence of fever/infection during neutropenic period  Description: INTERVENTIONS:- Monitor WBC  Outcome: Progressing     Problem: SAFETY ADULT  Goal: Patient will remain free of falls  Description: INTERVENTIONS:- Educate patient/family on patient safety including physical limitations- Instruct patient to call for assistance with activity - Consult OT/PT to assist with strengthening/mobility - Keep Call bell within reach- Keep bed low and locked with side rails adjusted as appropriate- Keep care items and personal belongings within reach- Initiate and maintain comfort rounds- Make Fall Risk Sign visible to staff- - Apply yellow socks and bracelet for high fall risk patients- Consider moving patient to room near nurses station  Outcome: Progressing

## 2025-04-26 NOTE — PROGRESS NOTES
Advanced Heart Failure / Pulmonary Hypertension Service Progress Note    Mesfin Cano 59 y.o. male   MRN: [id number]  Unit/Bed#: [facility] 520-01; Encounter: [id number]    Assessment:  Principal Problem:    Acute on chronic diastolic CHF (congestive heart failure) (HCC)  Active Problems:    Primary hypertension    Morbid obesity (HCC)    Paroxysmal atrial fibrillation (HCC)    Stage 3 chronic kidney disease (HCC)    Presence of CardioMEMS HF system    Type 2 diabetes mellitus without complication, without long-term current use of insulin (HCC)      Mesfin Cano is a 59-year-old man with PMH as below who presented to [facility] on 04/19/2025 (7th admission in past 12 months) with reported 15 lbs weight gain in past 48 hours. Reported feeling well on 04/18; weight on home scale of 314 lbs. Then on 04/19, noted weight of 327 lbs in the afternoon with associated TELLES and bilateral LE swelling. Denies lightheadedness, chest pain, wheezing, SOB at rest, and orthopnea. Reports less or urine output with diuretics prior to admit       Subjective:   Patient seen and examined. No significant events overnight. Pt is feeling well, but concerned about his potassium and kidney function.      Objective:   Intake/ Output: 1020/ 3400 (-2.3)  Weight: admit 323-->321--313--312 lbs--309 lbs--309--309 (same, )   Telemetry: non-tele    Today's Plan:  Continue Bumex 6 TID.  Volume status improved. Weights at BL, TELLES improving, JVD down, Creatinine stable but above BL. Hypokalemia resolved. Will add 5 mg Metolazone today with 40 Kdur  Continue Toprol XL 50 Qd, Jardiance 10 Qd, Spironolactone 25 BID.  BP's stable, creatinine trending down. Potassium stable 3.9 on higher dose of MRA  Dc home Sunday after monitoring response to Metolazone today  Repeat BMP in a.m.    Plan:  Acute on chronic HFpEF; LVEF 55%              Impression: unclear etiology of current exacerbation. ?increased Afib burden.  RHC / MEMS calibration  "10/11/2023: CardioMEMS data correlates to [facility].      Guideline Directed Medical Therapy:  --Aldosterone Antagonist: spironolactone 25 mg daily.   --SGLT2 Inhibitor: empagliflozin 10 mg daily.      Volume Management:  --Home Diuretic: Bumex 6 mg TID with metolazone 5 mg TTSa.   --Inpatient Diuretic: IV Bumex 1 mg/hr. --Bumex 6 TiD + PRN Metolazone (last: 4/26)  --K supplementation: potassium 40 mEq TID.                  Advanced Therapies:              --CardioMEMS: implanted 03/09/2022. Goal PAd of 12 mmHg.     Atrial fibrillation, parosxysmal              [id number] = 3 (HF, HTN, DM).              Anticoagulation on Eliquis.               Rate control: metoprolol succinate 50 mg daily.              Rhythm control: No.     Chronic kidney disease, stage IIIa (1.5-2.0)              On admit 1.45--1.42--1.69--1.69--1.88--2.28--2.11--2.3--2.15 today     Hypertension  Hyperlipidemia  Asthma, severe persistent: follows with Dr. Noonan as outpatient.   Obstructive sleep apnea  Diabetes mellitus, type II  History of gastric bypass (Samuel-en-Y) surgery   History of tobacco abuse         Vitals:   Blood pressure 113/74, pulse 78, temperature 98.4 °F (36.9 °C), temperature source Oral, resp. rate 20, height 6' 1\" (1.854 m), weight (!) 141 kg (309 lb 15.5 oz), SpO2 97%.    Body mass index is 40.9 kg/m².    I/O last 3 completed shifts:  In: 1200 [P.O.:1200]  Out: 4675 [Urine:4675]    Wt Readings from Last 10 Encounters:   04/26/25 (!) 141 kg (309 lb 15.5 oz)   04/14/25 (!) 145 kg (319 lb 3.2 oz)   04/09/25 (!) 145 kg (319 lb 6.4 oz)   03/26/25 (!) 147 kg (325 lb)   03/20/25 (!) 141 kg (309 lb 15.5 oz)   03/10/25 (!) 151 kg (333 lb 3.2 oz)   01/31/25 (!) 143 kg (315 lb)   01/09/25 (!) 143 kg (315 lb 0.6 oz)   12/04/24 (!) 147 kg (323 lb 6.4 oz)   11/28/24 (!) 143 kg (314 lb 9.6 oz)     Vitals:    04/25/25 1522 04/25/25 2156 04/26/25 0531 04/26/25 0739   BP: 119/72 141/78  113/74   BP Location: Left arm   Left arm   Pulse: 80 75  78 "   Resp: 19 20     Temp: 97.8 °F (36.6 °C) 98 °F (36.7 °C)  98.4 °F (36.9 °C)   TempSrc: Oral Oral  Oral   SpO2: 97% 95%  97%   Weight:   (!) 141 kg (309 lb 15.5 oz)    Height:           Physical Exam  Neck:      Comments: JVD stable  Cardiovascular:      Rate and Rhythm: Normal rate and regular rhythm.      Heart sounds: S1 normal and S2 normal.   Pulmonary:      Effort: No respiratory distress.      Breath sounds: Normal air entry. Decreased breath sounds present.   Musculoskeletal:      Right lower le+ Edema present.      Left lower le+ Edema present.   Skin:     General: Skin is warm and dry.   Neurological:      Mental Status: He is alert and oriented to person, place, and time. Mental status is at baseline.   Psychiatric:         Behavior: Behavior is cooperative.         Cognition and Memory: Cognition normal.             Current Facility-Administered Medications:     acetaminophen (TYLENOL) tablet 650 mg, 650 mg, Oral, Q6H PRN, Randy Reilly DO    albuterol (PROVENTIL HFA,VENTOLIN HFA) inhaler 2 puff, 2 puff, Inhalation, Q4H PRN, Randy Reilly DO    apixaban (ELIQUIS) tablet 5 mg, 5 mg, Oral, BID, Randy Reilly DO, 5 mg at 25 0918    atorvastatin (LIPITOR) tablet 10 mg, 10 mg, Oral, Daily, Randy Reilly, DO, 10 mg at 25 0918    budesonide-formoterol (SYMBICORT) 160-4.5 mcg/act inhaler 2 puff, 2 puff, Inhalation, BID, Randy Reilly, DO, 2 puff at 25 1713    bumetanide (BUMEX) tablet 6 mg, 6 mg, Oral, TID (diuretic), RODRIGUE Sahu, 6 mg at 25 0526    cyanocobalamin (VITAMIN B-12) tablet 1,000 mcg, 1,000 mcg, Oral, Daily, Randy Reilly DO, 1,000 mcg at 25 0918    Empagliflozin (JARDIANCE) tablet 10 mg, 10 mg, Oral, Daily, Randy Reilly, DO, 10 mg at 25 0822    insulin lispro (HumALOG/ADMELOG) 100 units/mL subcutaneous injection 1-6 Units, 1-6 Units, Subcutaneous, HS, Randy Reilly, , 3  Units at 04/25/25 2155    insulin lispro (HumALOG/ADMELOG) 100 units/mL subcutaneous injection 2-12 Units, 2-12 Units, Subcutaneous, TID AC, 6 Units at 04/26/25 0918 **AND** Fingerstick Glucose (POCT), , , TID AC, Randy Reilly DO    loratadine (CLARITIN) tablet 10 mg, 10 mg, Oral, Daily PRN, Lindsey Lofton PA-C, 10 mg at 04/24/25 1723    magnesium Oxide (MAG-OX) tablet 400 mg, 400 mg, Oral, BID, Randy Reilly DO, 400 mg at 04/26/25 0918    metoprolol succinate (TOPROL-XL) 24 hr tablet 50 mg, 50 mg, Oral, Daily, Randy Reilly DO, 50 mg at 04/26/25 0918    ondansetron (ZOFRAN) injection 4 mg, 4 mg, Intravenous, Q6H PRN, Randy Reilly DO    potassium chloride (Klor-Con M20) CR tablet 40 mEq, 40 mEq, Oral, TID With Meals, Aster García PA-C, 40 mEq at 04/26/25 0918    pregabalin (LYRICA) capsule 50 mg, 50 mg, Oral, HS, Randy Reilly DO, 50 mg at 04/25/25 2155    spironolactone (ALDACTONE) tablet 25 mg, 25 mg, Oral, BID, RODRIGUE Sahu, 25 mg at 04/26/25 0918    umeclidinium 62.5 mcg/actuation inhaler AEPB 1 puff, 1 puff, Inhalation, Daily, Randy Reilly DO, 1 puff at 04/25/25 0822    Labs & Results:      Results from last 7 days   Lab Units 04/21/25  0437 04/20/25  0445 04/19/25  1848   WBC Thousand/uL 6.57 6.47 7.28   HEMOGLOBIN g/dL 11.9* 11.8* 12.0   HEMATOCRIT % 37.3 36.0* 37.0   PLATELETS Thousands/uL 270 283 313         Results from last 7 days   Lab Units 04/26/25  0500 04/25/25  0439 04/24/25  0655   POTASSIUM mmol/L 3.9 3.7 3.4*   CHLORIDE mmol/L 89* 88* 86*   CO2 mmol/L 30 33* 33*   BUN mg/dL 57* 56* 52*   CREATININE mg/dL 2.15* 2.37* 2.11*   CALCIUM mg/dL 8.7 8.7 8.5             Thank you for the opportunity to participate in the care of this patient.    RODRIGUE Flynn  Advanced Heart Failure  [facility]

## 2025-04-26 NOTE — ASSESSMENT & PLAN NOTE
2D echo 6/2024 EF 55%  Received IV Bumex gtt., and has been transitioned to p.o. Bumex  Presently on Bumex 6 mg 3 times daily per cardiology  Continue metoprolol succinate 50 mg daily, Aldactone 25 mg p.o. daily, empagliflozin 10 mg p.o. daily  Medication compliance discussed and encouraged  Monitor kidney function and electrolytes  Cardiology following  Low-salt diet

## 2025-04-26 NOTE — PROGRESS NOTES
Progress Note - Hospitalist   Name: Mesfin Cano 59 y.o. male I MRN: [id number]  Unit/Bed#: [facility] 520-01 I Date of Admission: 4/19/2025   Date of Service: 4/26/2025 I Hospital Day: 7     Assessment & Plan  Acute on chronic diastolic CHF (congestive heart failure) ([facility])  2D echo 6/2024 EF 55%  Received IV Bumex gtt., and has been transitioned to p.o. Bumex  Presently on Bumex 6 mg 3 times daily per cardiology  Continue metoprolol succinate 50 mg daily, Aldactone 25 mg p.o. daily, empagliflozin 10 mg p.o. daily  Medication compliance discussed and encouraged  Monitor kidney function and electrolytes  Cardiology following  Low-salt diet    Stage 3 chronic kidney disease ([facility])  Monitor kidney function  Avoid nephrotoxins  Primary hypertension  Blood pressures reviewed, acceptable  Avoid hypotension  Monitor blood pressures  Morbid obesity ([facility])  Therapeutic lifestyle modification encouraged  Outpatient sleep study discussed and strongly encouraged  Paroxysmal atrial fibrillation ([facility])  Continue metoprolol succinate 50 mg p.o. daily  Continue Eliquis for anticoagulation  Type 2 diabetes mellitus without complication, without long-term current use of insulin ([facility])  A1c on 3/10/2025 - 6.3  Continue empagliflozin  Sitagliptin on hold while inpatient  Patient reports he trialed metformin for a week but discontinued due to GI upset and intolerance  Accu-Cheks reviewed hyperglycemia noted  Add Lantus 15 units at bedtime Humalog 3 units daily with meals  Titrate insulin dose based on Accu-Cheks  Outpatient endocrinology follow-up recommended, contact information provided in the discharge instructions  Monitor Accu-Cheks  Avoid hypoglycemia            VTE Pharmacologic Prophylaxis: VTE Score: 4 Moderate Risk (Score 3-4) - Pharmacological DVT Prophylaxis Ordered: apixaban (Eliquis).    Mobility:   Basic Mobility Inpatient Raw Score: 24  JH-HLM Goal: 8: Walk 250 feet or more  JH-HLM Achieved: 6: Walk 10 steps or more  JH-HLM Goal  NOT achieved. Continue with multidisciplinary rounding and encourage appropriate mobility to improve upon [facility] goals.    Patient Centered Rounds: I performed bedside rounds with nursing staff today.   Discussions with Specialists or Other Care Team Provider: Case management    Education and Discussions with Family / Patient:  Discussed with the patient, called left a message for spouse Karina.     Current Length of Stay: 7 day(s)  Current Patient Status: Inpatient   Certification Statement: The patient will continue to require additional inpatient hospital stay due to as outlined  Discharge Plan:  Awaiting cardiology inputs for disposition planning    Code Status: Level 1 - Full Code    Subjective     Comfortably in bed  Reports feeling better  Encourage out of bed and ambulation as able  Encourage incentive spirometry  Discussed with RN      Objective :  Temp:  [97.8 °F (36.6 °C)-98.4 °F (36.9 °C)] 98.4 °F (36.9 °C)  HR:  [75-80] 78  BP: (113-141)/(72-78) 113/74  Resp:  [19-20] 20  SpO2:  [95 %-98 %] 97 %  O2 Device: None (Room air)    Body mass index is 40.9 kg/m².     Input and Output Summary (last 24 hours):     Intake/Output Summary (Last 24 hours) at 4/26/2025 1306  Last data filed at 4/26/2025 1001  Gross per 24 hour   Intake 840 ml   Output 2900 ml   Net -2060 ml       Physical Exam    Comfortably in bed  Obese  Short thick neck  Lungs diminished breath sounds  Vesicular breath sounds  Heart sounds S1-S2 noted  Heart sounds are distant  Abdomen soft  Abdominal obesity noted  Awake follows commands  No rash    Lines/Drains:              Lab Results: I have reviewed the following results:   Results from last 7 days   Lab Units 04/21/25  0437 04/20/25  0445 04/19/25  1848   WBC Thousand/uL 6.57   < > 7.28   HEMOGLOBIN g/dL 11.9*   < > 12.0   HEMATOCRIT % 37.3   < > 37.0   PLATELETS Thousands/uL 270   < > 313   SEGS PCT %  --   --  66   LYMPHO PCT %  --   --  17   MONO PCT %  --   --  8   EOS PCT %  --   --   7*    < > = values in this interval not displayed.     Results from last 7 days   Lab Units 04/26/25  0500   SODIUM mmol/L 133*   POTASSIUM mmol/L 3.9   CHLORIDE mmol/L 89*   CO2 mmol/L 30   BUN mg/dL 57*   CREATININE mg/dL 2.15*   ANION GAP mmol/L 14*   CALCIUM mg/dL 8.7   GLUCOSE RANDOM mg/dL 213*         Results from last 7 days   Lab Units 04/26/25  1049 04/26/25  0619 04/25/25  2046 04/25/25  1607 04/25/25  1036 04/25/25  0557 04/24/25  2028 04/24/25  1518 04/24/25  1054 04/24/25  0633 04/23/25 2048 04/23/25  1617   POC GLUCOSE mg/dl 317* 255* 254* 200* 281* 201* 234* 197* 258* 234* 219* 194*               Recent Cultures (last 7 days):         Imaging Results Review: I personally reviewed the following image studies/reports in PACS and discussed pertinent findings with Radiology: chest xray. My interpretation of the radiology images/reports is: Lab results reviewed.      Last 24 Hours Medication List:     Current Facility-Administered Medications:     acetaminophen (TYLENOL) tablet 650 mg, Q6H PRN    albuterol (PROVENTIL HFA,VENTOLIN HFA) inhaler 2 puff, Q4H PRN    apixaban (ELIQUIS) tablet 5 mg, BID    atorvastatin (LIPITOR) tablet 10 mg, Daily    budesonide-formoterol (SYMBICORT) 160-4.5 mcg/act inhaler 2 puff, BID    bumetanide (BUMEX) tablet 6 mg, TID (diuretic)    cyanocobalamin (VITAMIN B-12) tablet 1,000 mcg, Daily    Empagliflozin (JARDIANCE) tablet 10 mg, Daily    insulin glargine (LANTUS) subcutaneous injection 15 Units 0.15 mL, HS    insulin lispro (HumALOG/ADMELOG) 100 units/mL subcutaneous injection 1-6 Units, HS    insulin lispro (HumALOG/ADMELOG) 100 units/mL subcutaneous injection 2-12 Units, TID AC **AND** Fingerstick Glucose (POCT), TID AC    insulin lispro (HumALOG/ADMELOG) 100 units/mL subcutaneous injection 3 Units, TID With Meals    loratadine (CLARITIN) tablet 10 mg, Daily PRN    magnesium Oxide (MAG-OX) tablet 400 mg, BID    metolazone (ZAROXOLYN) tablet 5 mg, Daily    metoprolol  succinate (TOPROL-XL) 24 hr tablet 50 mg, Daily    ondansetron (ZOFRAN) injection 4 mg, Q6H PRN    potassium chloride (Klor-Con M20) CR tablet 40 mEq, TID With Meals    potassium chloride (Klor-Con M20) CR tablet 40 mEq, Once    pregabalin (LYRICA) capsule 50 mg, HS    spironolactone (ALDACTONE) tablet 25 mg, BID    umeclidinium 62.5 mcg/actuation inhaler AEPB 1 puff, Daily    Administrative Statements   Today, Patient Was Seen By: Arpan Coppola MD  I have spent a total time of 31 minutes in caring for this patient on the day of the visit/encounter including Diagnostic results, Risks and benefits of tx options, Instructions for management, Patient and family education, Importance of tx compliance, Risk factor reductions, Impressions, Counseling / Coordination of care, Documenting in the medical record, Reviewing/placing orders in the medical record (including tests, medications, and/or procedures), Obtaining or reviewing history  , and Communicating with other healthcare professionals .    **Please Note: This note may have been constructed using a voice recognition system.**

## 2025-04-27 VITALS
HEART RATE: 82 BPM | WEIGHT: 308.6 LBS | TEMPERATURE: 97.6 F | OXYGEN SATURATION: 93 % | DIASTOLIC BLOOD PRESSURE: 79 MMHG | SYSTOLIC BLOOD PRESSURE: 130 MMHG | BODY MASS INDEX: 40.9 KG/M2 | HEIGHT: 73 IN | RESPIRATION RATE: 20 BRPM

## 2025-04-27 LAB
ANION GAP SERPL CALCULATED.3IONS-SCNC: 14 MMOL/L (ref 4–13)
BUN SERPL-MCNC: 59 MG/DL (ref 5–25)
CALCIUM SERPL-MCNC: 8.8 MG/DL (ref 8.4–10.2)
CHLORIDE SERPL-SCNC: 89 MMOL/L (ref 96–108)
CO2 SERPL-SCNC: 30 MMOL/L (ref 21–32)
CREAT SERPL-MCNC: 2.16 MG/DL (ref 0.6–1.3)
GFR SERPL CREATININE-BSD FRML MDRD: 32 ML/MIN/1.73SQ M
GLUCOSE SERPL-MCNC: 209 MG/DL (ref 65–140)
GLUCOSE SERPL-MCNC: 254 MG/DL (ref 65–140)
GLUCOSE SERPL-MCNC: 267 MG/DL (ref 65–140)
MAGNESIUM SERPL-MCNC: 2.7 MG/DL (ref 1.9–2.7)
POTASSIUM SERPL-SCNC: 3.7 MMOL/L (ref 3.5–5.3)
SODIUM SERPL-SCNC: 133 MMOL/L (ref 135–147)

## 2025-04-27 PROCEDURE — 99232 SBSQ HOSP IP/OBS MODERATE 35: CPT

## 2025-04-27 PROCEDURE — 82948 REAGENT STRIP/BLOOD GLUCOSE: CPT

## 2025-04-27 PROCEDURE — 83735 ASSAY OF MAGNESIUM: CPT | Performed by: INTERNAL MEDICINE

## 2025-04-27 PROCEDURE — 80048 BASIC METABOLIC PNL TOTAL CA: CPT | Performed by: INTERNAL MEDICINE

## 2025-04-27 PROCEDURE — 99239 HOSP IP/OBS DSCHRG MGMT >30: CPT | Performed by: INTERNAL MEDICINE

## 2025-04-27 RX ORDER — METOLAZONE 2.5 MG/1
5 TABLET ORAL 3 TIMES WEEKLY
Qty: 180 TABLET | Refills: 0 | Status: SHIPPED | OUTPATIENT
Start: 2025-04-28

## 2025-04-27 RX ORDER — SPIRONOLACTONE 25 MG/1
25 TABLET ORAL 2 TIMES DAILY
Qty: 60 TABLET | Refills: 0 | Status: SHIPPED | OUTPATIENT
Start: 2025-04-27 | End: 2025-05-27

## 2025-04-27 RX ADMIN — BUDESONIDE AND FORMOTEROL FUMARATE DIHYDRATE 2 PUFF: 160; 4.5 AEROSOL RESPIRATORY (INHALATION) at 08:22

## 2025-04-27 RX ADMIN — SPIRONOLACTONE 25 MG: 25 TABLET, FILM COATED ORAL at 08:18

## 2025-04-27 RX ADMIN — ATORVASTATIN CALCIUM 10 MG: 10 TABLET, FILM COATED ORAL at 08:18

## 2025-04-27 RX ADMIN — POTASSIUM CHLORIDE 40 MEQ: 1500 TABLET, EXTENDED RELEASE ORAL at 11:47

## 2025-04-27 RX ADMIN — BUMETANIDE 6 MG: 2 TABLET ORAL at 05:29

## 2025-04-27 RX ADMIN — MAGNESIUM OXIDE TAB 400 MG (241.3 MG ELEMENTAL MG) 400 MG: 400 (241.3 MG) TAB at 08:18

## 2025-04-27 RX ADMIN — INSULIN LISPRO 3 UNITS: 100 INJECTION, SOLUTION INTRAVENOUS; SUBCUTANEOUS at 08:25

## 2025-04-27 RX ADMIN — CYANOCOBALAMIN TAB 500 MCG 1000 MCG: 500 TAB at 08:18

## 2025-04-27 RX ADMIN — METOLAZONE 5 MG: 5 TABLET ORAL at 08:20

## 2025-04-27 RX ADMIN — METOPROLOL SUCCINATE 50 MG: 50 TABLET, EXTENDED RELEASE ORAL at 08:18

## 2025-04-27 RX ADMIN — INSULIN LISPRO 3 UNITS: 100 INJECTION, SOLUTION INTRAVENOUS; SUBCUTANEOUS at 11:49

## 2025-04-27 RX ADMIN — INSULIN LISPRO 6 UNITS: 100 INJECTION, SOLUTION INTRAVENOUS; SUBCUTANEOUS at 08:25

## 2025-04-27 RX ADMIN — POTASSIUM CHLORIDE 40 MEQ: 1500 TABLET, EXTENDED RELEASE ORAL at 08:18

## 2025-04-27 RX ADMIN — EMPAGLIFLOZIN 10 MG: 10 TABLET, FILM COATED ORAL at 08:21

## 2025-04-27 RX ADMIN — APIXABAN 5 MG: 5 TABLET, FILM COATED ORAL at 08:18

## 2025-04-27 RX ADMIN — INSULIN LISPRO 6 UNITS: 100 INJECTION, SOLUTION INTRAVENOUS; SUBCUTANEOUS at 11:49

## 2025-04-27 RX ADMIN — BUMETANIDE 6 MG: 2 TABLET ORAL at 11:47

## 2025-04-27 NOTE — ASSESSMENT & PLAN NOTE
Therapeutic lifestyle modification encouraged  Outpatient sleep study discussed and strongly encouraged  Contact information for sleep medicine placed in discharge instructions

## 2025-04-27 NOTE — DISCHARGE SUMMARY
Discharge Summary - Hospitalist   Name: Mesfin Cano 59 y.o. male I MRN: [id number]  [facility] I Date of Admission: 4/19/2025   Date of Service: 4/27/2025 I Hospital Day: 8     Assessment & Plan  Acute on chronic diastolic CHF (congestive heart failure) (HCC)  2D echo 6/2024 EF 55%  Received IV Bumex gtt., and has been transitioned to p.o. Bumex  Continue Bumex 6 mg 3 times daily  Metolazone 5 mg 3 times weekly with potassium  Aldactone increased to 25 mg twice daily  Continue metoprolol succinate 50 mg daily  Empagliflozin 10 mg p.o. daily  Medication compliance discussed and encouraged  Diet and fluid restriction discussed and encouraged  Cardiology plans for close outpatient follow-up noted  Ambulatory cardiac rehabilitation follow-up request placed in discharge instructions    Stage 3 chronic kidney disease (HCC)  Close kidney function monitoring and outpatient follow-up with cardiology  Primary hypertension  Blood pressures reviewed, acceptable  Ambulatory blood pressure monitoring discussed and encouraged  Morbid obesity (HCC)  Therapeutic lifestyle modification encouraged  Outpatient sleep study discussed and strongly encouraged  Contact information for sleep medicine placed in discharge instructions  Paroxysmal atrial fibrillation (HCC)  Continue metoprolol succinate 50 mg p.o. daily  Continue Eliquis for anticoagulation  Type 2 diabetes mellitus without complication, without long-term current use of insulin (HCC)  A1c on 3/10/2025 - 6.3  Continue empagliflozin  Continue sitagliptin  Patient reports metformin intolerance  Outpatient endocrinology follow-up discussed and encouraged  Accu-Chek monitoring therapeutic lifestyle modification discussed and encouraged                 Discharge Summary - [facility] Internal Medicine    Patient Information: Mesfin Cano 59 y.o. male MRN: [id number]  Unit/Bed#: [facility] 520-01 Encounter: [id number]    Discharging Physician / Practitioner: Arpan VASQUEZ  MD Abdon  PCP: RODRIGUE Hodge  Admission Date: 4/19/2025  Discharge Date: 04/27/25    Disposition:     Home    Reason for Admission: Weight gain    Discharge Diagnoses:     Principal Problem:    Acute on chronic diastolic CHF (congestive heart failure) (HCC)  Active Problems:    Primary hypertension    Morbid obesity (HCC)    Paroxysmal atrial fibrillation (HCC)    Stage 3 chronic kidney disease (HCC)    Presence of CardioMEMS HF system    Type 2 diabetes mellitus without complication, without long-term current use of insulin (HCC)  Resolved Problems:    * No resolved hospital problems. *      Consultations During Hospital Stay:  Cardiology    Procedures Performed:     Chest x-ray no active lung disease      Hospital Course:     Mesfin Cano is a 59 y.o. male patient who originally presented to the hospital on 4/19/2025 due to weight gain.  Patient was diagnosed with acute on chronic diastolic congestive heart failure he was placed on IV Bumex gtt. on admission.  Seen in consultation with cardiology.    He was closely monitored and managed by cardiology.  Today he is clinically and symptomatically improved and will be discharged on Bumex 6 mg 3 times daily, metolazone 5 mg 3 times weekly with potassium, metoprolol XL 50 mg daily  Aldactone increased to 25 mg twice daily    Cardiology plans for outpatient monitoring and close follow-up noted  Outpatient follow-up for cardiac rehabilitation request placed in discharge instructions    He reports metformin intolerance with GI symptoms, outpatient endocrinology follow-up recommended he is agreeable, contact information placed in discharge instructions    His chronic conditions remained stable no medication changes, he is clinically and symptomatically improved hemodynamically stable and is deemed ready for discharge today.  Kindly review the chart for details.      Condition at Discharge: fair     Discharge Day Visit / Exam:     Subjective:      Comfortably in  "bed  Reports feeling better  Agreeable to discharge plan        Vitals: Blood Pressure: 130/79 (04/27/25 0751)  Pulse: 82 (04/27/25 0751)  Temperature: 97.6 °F (36.4 °C) (04/27/25 0751)  Temp Source: Oral (04/27/25 0751)  Respirations: 20 (04/26/25 2207)  Height: 6' 1\" (185.4 cm) (04/19/25 2047)  Weight - Scale: (!) 140 kg (308 lb 9.6 oz) (04/27/25 0529)  SpO2: 93 % (04/27/25 0751)  Exam:   Physical Exam    Comfortably sitting up in bed  Obese  Short thick neck  Lungs diminished breath sounds  Heart sounds S1-S2 noted  Abdomen soft  Abdominal obesity noted  Awake follows commands  No pedal edema  No rash    Discharge instructions/Information to patient and family:   See after visit summary for information provided to patient and family.      Discharge plan discussed with cardiology  Discharge plan discussed with the patient, updated spouse Karina in detail questions answered  Outpatient follow-up with cardiology, endocrinology, sleep medicine, primary care physician    Provisions for Follow-Up Care:  See after visit summary for information related to follow-up care and any pertinent home health orders.      Planned Readmission: no     Discharge Statement:  I spent 43 minutes discharging the patient. This time was spent on the day of discharge. I had direct contact with the patient on the day of discharge. Greater than 50% of the total time was spent examining patient, answering all patient questions, arranging and discussing plan of care with patient as well as directly providing post-discharge instructions.  Additional time then spent on discharge activities.    Discharge Medications:  See after visit summary for reconciled discharge medications provided to patient and family.      ** Please Note: This note has been constructed using a voice recognition system **            "

## 2025-04-27 NOTE — ASSESSMENT & PLAN NOTE
Blood pressures reviewed, acceptable  Ambulatory blood pressure monitoring discussed and encouraged

## 2025-04-27 NOTE — PROGRESS NOTES
Advanced Heart Failure / Pulmonary Hypertension Service Progress Note    Mesfin Cano 59 y.o. male   MRN: [id number]  Unit/Bed#: [facility] 520-01; Encounter: [id number]    Assessment:  Principal Problem:    Acute on chronic diastolic CHF (congestive heart failure) (HCC)  Active Problems:    Primary hypertension    Morbid obesity (HCC)    Paroxysmal atrial fibrillation (HCC)    Stage 3 chronic kidney disease (HCC)    Presence of CardioMEMS HF system    Type 2 diabetes mellitus without complication, without long-term current use of insulin (HCC)    Mesfin Cano is a 59-year-old man with PMH as below who presented to [facility] on 04/19/2025 (7th admission in past 12 months) with reported 15 lbs weight gain in past 48 hours. Reported feeling well on 04/18; weight on home scale of 314 lbs. Then on 04/19, noted weight of 327 lbs in the afternoon with associated TELLES and bilateral LE swelling. Denies lightheadedness, chest pain, wheezing, SOB at rest, and orthopnea. Reports less or urine output with diuretics prior to admit     Subjective:   Patient seen and examined. Ready to dc home.  Discussed o/p medication regimen f/u in HF clinic.    Objective:   Intake/ Output: 780/ 3525 (-2.7)  Weight: admit 323-->321--313--312 lbs--309 lbs--309--309--308 ()   Telemetry: non tele    Today's Plan:  Continue Bumex 6 TID.  Volume status improved. Weights at BL, TELLES improving, JVD down. Creatinine stable today, Hypokalemia resolved with Metolazone 5 mg yesterday.   Continue Toprol XL 50 Qd, Jardiance 10 Qd, Spironolactone 25 BID--inc this admit.  BP's stable, creatinine trending down. Potassium stable on higher dose of MRA  Ok to DC home with close HF f/u and repeat BMP in next 3 days  Plan for home: Continue GDMT at current doses.  Continue Bumex 6 mg TID with 40 of Kdur TID. Take Metolazone 5 mg Tues, Thurs, Sat with Kdur 40 mEQ  Will schedule HF appt in [address] [facility] this week    Plan:  Acute on chronic HFpEF; LVEF  "55%              Impression: unclear etiology of current exacerbation. ?increased Afib burden.  RHC / MEMS calibration 10/11/2023: CardioMEMS data correlates to RHC.      Guideline Directed Medical Therapy:  --Aldosterone Antagonist: spironolactone 25 mg daily.   --SGLT2 Inhibitor: empagliflozin 10 mg daily.      Volume Management:  --Home Diuretic: Bumex 6 mg TID with metolazone 5 mg TTSa.   --Inpatient Diuretic: IV Bumex 1 mg/hr. --Bumex 6 TiD + PRN Metolazone (last: 4/26)  --K supplementation: potassium 40 mEq TID.                  Advanced Therapies:              --CardioMEMS: implanted 03/09/2022. Goal PAd of 12 mmHg.     Atrial fibrillation, parosxysmal              [id number] = 3 (HF, HTN, DM).              Anticoagulation on Eliquis.               Rate control: metoprolol succinate 50 mg daily.              Rhythm control: No.     Chronic kidney disease, stage IIIa (1.5-2.0)              On admit 1.45--1.42--1.69--1.69--1.88--2.28--2.11--2.3--2.15--2.16 today     Hypertension  Hyperlipidemia  Asthma, severe persistent: follows with Dr. Noonan as outpatient.   Obstructive sleep apnea  Diabetes mellitus, type II  History of gastric bypass (Samuel-en-Y) surgery   History of tobacco abuse         Vitals:   Blood pressure 130/79, pulse 82, temperature 97.6 °F (36.4 °C), temperature source Oral, resp. rate 20, height 6' 1\" (1.854 m), weight (!) 140 kg (308 lb 9.6 oz), SpO2 93%.    Body mass index is 40.71 kg/m².    I/O last 3 completed shifts:  In: 780 [P.O.:780]  Out: 4525 [Urine:4525]    Wt Readings from Last 10 Encounters:   04/27/25 (!) 140 kg (308 lb 9.6 oz)   04/14/25 (!) 145 kg (319 lb 3.2 oz)   04/09/25 (!) 145 kg (319 lb 6.4 oz)   03/26/25 (!) 147 kg (325 lb)   03/20/25 (!) 141 kg (309 lb 15.5 oz)   03/10/25 (!) 151 kg (333 lb 3.2 oz)   01/31/25 (!) 143 kg (315 lb)   01/09/25 (!) 143 kg (315 lb 0.6 oz)   12/04/24 (!) 147 kg (323 lb 6.4 oz)   11/28/24 (!) 143 kg (314 lb 9.6 oz)     Vitals:    04/26/25 1708 " 04/26/25 2207 04/27/25 0529 04/27/25 0751   BP: 127/70 132/82 130/80 130/79   BP Location:  Left arm  Left arm   Pulse: 79 76 79 82   Resp:  20     Temp:  98.1 °F (36.7 °C)  97.6 °F (36.4 °C)   TempSrc:  Oral  Oral   SpO2: 92% 95% 98% 93%   Weight:   (!) 140 kg (308 lb 9.6 oz)    Height:           Physical Exam  Constitutional:       Appearance: He is obese.   Neck:      Vascular: No JVD.   Cardiovascular:      Rate and Rhythm: Normal rate and regular rhythm.   Pulmonary:      Effort: No respiratory distress.      Breath sounds: Normal air entry.   Skin:     General: Skin is warm and dry.   Neurological:      Mental Status: He is alert and oriented to person, place, and time. Mental status is at baseline.   Psychiatric:         Behavior: Behavior is cooperative.         Cognition and Memory: Cognition normal.             Current Facility-Administered Medications:     acetaminophen (TYLENOL) tablet 650 mg, 650 mg, Oral, Q6H PRN, Randy Reilly DO    albuterol (PROVENTIL HFA,VENTOLIN HFA) inhaler 2 puff, 2 puff, Inhalation, Q4H PRN, Randy Reilly, DO    apixaban (ELIQUIS) tablet 5 mg, 5 mg, Oral, BID, Randy Reilly, DO, 5 mg at 04/27/25 0818    atorvastatin (LIPITOR) tablet 10 mg, 10 mg, Oral, Daily, Randy Reilly, DO, 10 mg at 04/27/25 0818    budesonide-formoterol (SYMBICORT) 160-4.5 mcg/act inhaler 2 puff, 2 puff, Inhalation, BID, Randy Reilly, DO, 2 puff at 04/27/25 0822    bumetanide (BUMEX) tablet 6 mg, 6 mg, Oral, TID (diuretic), RODRIGUE Sahu, 6 mg at 04/27/25 0529    cyanocobalamin (VITAMIN B-12) tablet 1,000 mcg, 1,000 mcg, Oral, Daily, Randy Reilly, DO, 1,000 mcg at 04/27/25 0818    Empagliflozin (JARDIANCE) tablet 10 mg, 10 mg, Oral, Daily, Randy Reilly DO, 10 mg at 04/27/25 0821    insulin glargine (LANTUS) subcutaneous injection 15 Units 0.15 mL, 15 Units, Subcutaneous, HS, Arpan Coppola MD, 15 Units at 04/26/25 7556     insulin lispro (HumALOG/ADMELOG) 100 units/mL subcutaneous injection 1-6 Units, 1-6 Units, Subcutaneous, HS, Randy Reilly DO, 4 Units at 04/26/25 2105    insulin lispro (HumALOG/ADMELOG) 100 units/mL subcutaneous injection 2-12 Units, 2-12 Units, Subcutaneous, TID AC, 6 Units at 04/27/25 0825 **AND** Fingerstick Glucose (POCT), , , TID AC, Randy Reilly DO    insulin lispro (HumALOG/ADMELOG) 100 units/mL subcutaneous injection 3 Units, 3 Units, Subcutaneous, TID With Meals, Arpan Coppola MD, 3 Units at 04/27/25 0825    loratadine (CLARITIN) tablet 10 mg, 10 mg, Oral, Daily PRN, Lindsey Lofton PA-C, 10 mg at 04/24/25 1723    magnesium Oxide (MAG-OX) tablet 400 mg, 400 mg, Oral, BID, Randy Reilly DO, 400 mg at 04/27/25 0818    metolazone (ZAROXOLYN) tablet 5 mg, 5 mg, Oral, Daily, RODRIGUE Sahu, 5 mg at 04/27/25 0820    metoprolol succinate (TOPROL-XL) 24 hr tablet 50 mg, 50 mg, Oral, Daily, Randy Reilly DO, 50 mg at 04/27/25 0818    ondansetron (ZOFRAN) injection 4 mg, 4 mg, Intravenous, Q6H PRN, Randy Reilly DO    potassium chloride (Klor-Con M20) CR tablet 40 mEq, 40 mEq, Oral, TID With Meals, Aster García PA-C, 40 mEq at 04/27/25 0818    pregabalin (LYRICA) capsule 50 mg, 50 mg, Oral, HS, Randy Reilly DO, 50 mg at 04/26/25 2101    spironolactone (ALDACTONE) tablet 25 mg, 25 mg, Oral, BID, RODRIGUE Sahu, 25 mg at 04/27/25 0818    umeclidinium 62.5 mcg/actuation inhaler AEPB 1 puff, 1 puff, Inhalation, Daily, Randy Reilly, DO, 1 puff at 04/26/25 0925    Labs & Results:      Results from last 7 days   Lab Units 04/21/25  0437   WBC Thousand/uL 6.57   HEMOGLOBIN g/dL 11.9*   HEMATOCRIT % 37.3   PLATELETS Thousands/uL 270         Results from last 7 days   Lab Units 04/27/25  0506 04/26/25  0500 04/25/25  0439   POTASSIUM mmol/L 3.7 3.9 3.7   CHLORIDE mmol/L 89* 89* 88*   CO2 mmol/L 30 30 33*   BUN mg/dL  59* 57* 56*   CREATININE mg/dL 2.16* 2.15* 2.37*   CALCIUM mg/dL 8.8 8.7 8.7             Thank you for the opportunity to participate in the care of this patient.    RODRIGUE Flynn  Advanced Heart Failure  [facility]

## 2025-04-27 NOTE — ASSESSMENT & PLAN NOTE
2D echo 6/2024 EF 55%  Received IV Bumex gtt., and has been transitioned to p.o. Bumex  Continue Bumex 6 mg 3 times daily  Metolazone 5 mg 3 times weekly with potassium  Aldactone increased to 25 mg twice daily  Continue metoprolol succinate 50 mg daily  Empagliflozin 10 mg p.o. daily  Medication compliance discussed and encouraged  Diet and fluid restriction discussed and encouraged  Cardiology plans for close outpatient follow-up noted  Ambulatory cardiac rehabilitation follow-up request placed in discharge instructions

## 2025-04-27 NOTE — ASSESSMENT & PLAN NOTE
A1c on 3/10/2025 - 6.3  Continue empagliflozin  Continue sitagliptin  Patient reports metformin intolerance  Outpatient endocrinology follow-up discussed and encouraged  Accu-Chek monitoring therapeutic lifestyle modification discussed and encouraged

## 2025-04-28 ENCOUNTER — TRANSITIONAL CARE MANAGEMENT (OUTPATIENT)
Dept: FAMILY MEDICINE CLINIC | Facility: CLINIC | Age: 60
End: 2025-04-28

## 2025-04-28 ENCOUNTER — PATIENT OUTREACH (OUTPATIENT)
Dept: CASE MANAGEMENT | Facility: OTHER | Age: 60
End: 2025-04-28

## 2025-04-28 NOTE — UTILIZATION REVIEW
NOTIFICATION OF ADMISSION DISCHARGE   This is a Notification of Discharge from [facility]. Please be advised that this patient has been discharge from our facility. Below you will find the admission and discharge date and time including the patient’s disposition.   UTILIZATION REVIEW CONTACT:  Utilization Review Assistants  Network Utilization Review Department  Phone: [phone] x carefully listen to the prompts. All voicemails are confidential.  Email: [email or web address]     ADMISSION INFORMATION  PRESENTATION DATE: 4/19/2025  6:15 PM  OBERVATION ADMISSION DATE: N/A  INPATIENT ADMISSION DATE: 4/19/25  8:10 PM   DISCHARGE DATE: 4/27/2025  2:13 PM   DISPOSITION:Home/Self Care    Network Utilization Review Department  ATTENTION: Please call with any questions or concerns to [phone] and carefully listen to the prompts so that you are directed to the right person. All voicemails are confidential.   For Discharge needs, contact Care Management DC Support Team at [phone] opt. 2  Send all requests for admission clinical reviews, approved or denied determinations and any other requests to dedicated fax number below belonging to the campus where the patient is receiving treatment. List of dedicated fax numbers for the Facilities:  FACILITY NAME UR FAX NUMBER   ADMISSION DENIALS (Administrative/Medical Necessity) [phone]   DISCHARGE SUPPORT TEAM ([facility]) [phone]   PARENT CHILD HEALTH (Maternity/NICU/Pediatrics) [phone]   [facility] [phone]   [facility] [phone]   [facility] [phone]   [facility] [phone]   [facility] [phone]   [facility] [phone]   [facility] [phone]   [facility] [phone]   [facility]  [facility] [phone]   [facility] [phone]   General acute hospital [phone]   [facility] [phone]

## 2025-04-28 NOTE — UTILIZATION REVIEW
NOTIFICATION OF ADMISSION DISCHARGE   This is a Notification of Discharge from [facility]. Please be advised that this patient has been discharge from our facility. Below you will find the admission and discharge date and time including the patient’s disposition.   UTILIZATION REVIEW CONTACT:  Utilization Review Assistants  Network Utilization Review Department  Phone: [phone] x carefully listen to the prompts. All voicemails are confidential.  Email: [email or web address]     ADMISSION INFORMATION  PRESENTATION DATE: 4/19/2025  6:15 PM  OBERVATION ADMISSION DATE: N/A  INPATIENT ADMISSION DATE: 4/19/25  8:10 PM   DISCHARGE DATE: 4/27/2025  2:13 PM   DISPOSITION:Home/Self Care    Network Utilization Review Department  ATTENTION: Please call with any questions or concerns to [phone] and carefully listen to the prompts so that you are directed to the right person. All voicemails are confidential.   For Discharge needs, contact Care Management DC Support Team at [phone] opt. 2  Send all requests for admission clinical reviews, approved or denied determinations and any other requests to dedicated fax number below belonging to the campus where the patient is receiving treatment. List of dedicated fax numbers for the Facilities:  FACILITY NAME UR FAX NUMBER   ADMISSION DENIALS (Administrative/Medical Necessity) [phone]   DISCHARGE SUPPORT TEAM ([facility]) [phone]   PARENT CHILD HEALTH (Maternity/NICU/Pediatrics) [phone]   [facility] [phone]   [facility] [phone]   [facility] [phone]   [facility] [phone]   [facility] [phone]   [facility] [phone]   [facility] [phone]   [facility] [phone]   [facility]  [facility] [phone]   [facility] [phone]   [facility] [phone]   [place] [phone]

## 2025-04-28 NOTE — PROGRESS NOTES
Outpatient Care Management Note:  Referral for care management services received after recent hospitalization.  Chart review completed.     History includes afib, heart failure, cardiomyopathy, HTN, VICKY, DM with most recent A1C of 6.3, arthritis, CKD and hyperlipidemia.      Mr. Cano has had 3 HF admissions this year.  Most recent 4/19/2025-4/27/2025.  Called health call with increased shortness of breath, weight gain and lower extremity swelling.  Referred to ER.  Weight up to 327 pounds. Received Bumex drip and transitioned to oral.  Has a Cardiomems in place to monitor pulmonary arterial pressures.  Discharged on metolazone three times weekly with additional potassium.  Bumex 6 mg three times daily.  Discharge weight-308 pounds.     Outreach call attempted.  Mr. Cano identifies himself on voice mail.  Message left for patient to please return call.  Contact information left on message.     Second call reminder sent.

## 2025-04-29 ENCOUNTER — TELEPHONE (OUTPATIENT)
Dept: CARDIOLOGY CLINIC | Facility: CLINIC | Age: 60
End: 2025-04-29

## 2025-04-29 NOTE — TELEPHONE ENCOUNTER
[facility] DC f/u call to patient. Left a voicemail & c/b number with request for patient to return call to nurse.

## 2025-04-30 ENCOUNTER — PATIENT OUTREACH (OUTPATIENT)
Dept: CASE MANAGEMENT | Facility: OTHER | Age: 60
End: 2025-04-30

## 2025-04-30 NOTE — PROGRESS NOTES
Outpatient Care Management Note:  Outreach call attempted to Mr. Cano. Message left for patient to please return call.  Contact information left on message.     As this is the second unsuccessful outreach attempt, an [facility] letter is being sent to Mr. Cano's  MyChart per protocol.  Care management program will be closed at this time. Should Mr. Alcala call and have care management needs, a new program will be opened.     In basket message routed to PCP and cardiology to make them aware I have been unable to contact.

## 2025-05-01 NOTE — PROGRESS NOTES
Advanced Heart Failure / Pulmonary Hypertension Outpatient Visit    Mesfin Cano 59 y.o. male   MRN: [id number]  Encounter: [id number]    Assessment:  Patient Active Problem List    Diagnosis Date Noted    Pancreatic cyst 04/14/2025    Liver lesion, left lobe 01/08/2025    Serum total bilirubin elevated 01/05/2025    Acute on chronic diastolic CHF (congestive heart failure) (HCC) 01/04/2025    Iron deficiency 10/07/2024    Onychomycosis 10/05/2024    Moderate persistent asthma without complication 06/18/2024    Type 2 diabetes mellitus without complication, without long-term current use of insulin (HCC) 12/23/2023    Anemia due to chronic kidney disease 10/16/2023    Chronic heart failure with preserved ejection fraction (HFpEF, >= 50%) (HCC) 04/10/2023    Diabetic polyneuropathy associated with type 2 diabetes mellitus (HCC) 12/20/2022    Stage 3 chronic kidney disease (HCC) 09/16/2022    Presence of CardioMEMS HF system 03/09/2022    Paroxysmal atrial fibrillation (HCC) 03/01/2022    Foraminal stenosis of lumbar region 02/23/2021    VICKY (obstructive sleep apnea)     Hyperlipidemia 04/18/2019    Primary osteoarthritis of both knees 06/14/2018    Irritable bowel syndrome with diarrhea 01/30/2018    Primary hypertension 01/30/2018    Vitamin B12 deficiency 03/08/2016    Vitamin D deficiency 03/08/2016    Morbid obesity (HCC) 02/23/2016       Today's Plan:  Bumex 6 TID + metolazone 5mg  three x weekly--Euvolemic on exam, weights stable.  May be slightly over diuresed with diminished UO and dizziness.  Will correlate with BMP today  Con't Jardiance and Spironolactone. BP's stable 130's  Blood work today- will call with abnormal results  Keep appt with Aster on 5/14/25 and Dr. Wilcox in July    Plan:  Acute on chronic HFpEF; LVEF 55%              Impression: unclear etiology of current exacerbation. ?increased Afib burden.  RHC / MEMS calibration 10/11/2023: CardioMEMS data correlates to RHC.     Weight: on   "lbs---> 306 lbs today  Last BMP: 4/27/25: , K 3.7, creat 2.16  Volume status: euvolemic on exam  BP today: 130/70,      Guideline Directed Medical Therapy:  --Aldosterone Antagonist: spironolactone 25 mg daily.   --SGLT2 Inhibitor: empagliflozin 10 mg daily.      Volume Management:  --Home Diuretic: Bumex 6 mg TID with metolazone 5 mg TTSa.   --Inpatient Diuretic: IV Bumex 1 mg/hr. --Bumex 6 TiD + PRN Metolazone (last: 4/26)  --K supplementation: potassium 40 mEq TID.                  Advanced Therapies:              --CardioMEMS: implanted 03/09/2022. Goal PAd of 12 mmHg.     Atrial fibrillation, parosxysmal              [id number] = 3 (HF, HTN, DM).              Anticoagulation on Eliquis.               Rate control: metoprolol succinate 50 mg daily.              Rhythm control: No.     Chronic kidney disease, stage IIIa (1.5-2.0)              On DC: 2.16     Hypertension  Hyperlipidemia  Asthma, severe persistent: follows with Dr. Noonan as outpatient.   Obstructive sleep apnea  Diabetes mellitus, type II  History of gastric bypass (Samuel-en-Y) surgery   History of tobacco abuse         HPI:   Mesfin Cano is a 59-year-old man with a PMH as above who presents to the office for follow-up. Follows with Dr. Wilcox.      03/26/2025 with TH: \"presented to the hospital in early January with worsening SOB and weight gain of 7 pounds. Admitted for decompensated heart failure. Had been getting additional doses of metolazone as an outpatient due to fluid retention and elevated PAD readings on CardioMEMS. The readmitted on 3/11, again with volume overload. Diuresed with Bumex gtt and transitioned back to oral Bumex 6 mg TID, Spironolactone, Jardiance and Metolazone 5 mg three times weekly.      presents today for hospital follow-up. Reports gaining several pounds since discharge. CardioMEMS reading from yesterday with PAD of 16. States he feels completely fine and at his baseline. He reports no dietary " "indiscretions.\"     Advised to take an additional metolazone x1 on 03/26. Patient misunderstood instructions, and began taking metolazone 5 mg daily. Patient was on daily metolazone dosing for ~2-3 days before this was remedied, and dosing changed back to 3x weekly.      04/09/2025: Patient presents for follow-up. Down 6 lbs since last office visit. Confirmed patient is now back on metolazone schedule of 5 mg 3x weekly. Denies lightheadedness, dizziness, chest pain, palpitations, SOB at rest, TELLES, worsening LE swelling, PND, and orthopnea.    4/19/25 Hospital Admit: Mesfin Cano is a 59 y.o. male patient who originally presented to the hospital on 4/19/2025 due to weight gain.  Patient was diagnosed with acute on chronic diastolic congestive heart failure he was placed on IV Bumex gtt. on admission. He was closely monitored and managed by cardiology.  Today he is clinically and symptomatically improved and will be discharged on Bumex 6 mg 3 times daily, metolazone 5 mg 3 times weekly with potassium, metoprolol XL 50 mg daily. Aldactone increased to 25 mg twice daily    5/2/25: 1 week f/u: Pt states he is feeling well overall.  Weights down at home to 306 lbs.  Reports TELLES is at baseline, improved since hospitalization.  Took metolazone Tues and Thurs this week with diminished UO.  Encouraged to increase fluid intake for the next couple days as he may be slightly dry.  Pt also reports new dizziness this past week.  He is obtaining blood work today--will call with abnormal results.         Past Medical History:   Diagnosis Date    Abnormal stress test 06/26/2024    Acute gastritis without hemorrhage     last assessed 3/24/17    Acute on chronic diastolic heart failure (HCC) 01/04/2025    Asthma     Atrial fibrillation (HCC)     Bilateral leg edema 02/19/2020    CHF (congestive heart failure) (HCC)     CHF, acute on chronic (HCC) 03/12/2025    Coronary artery disease     Daytime sleepiness 07/17/2019    Diabetes " mellitus (HCC)     Dyspnea on exertion 10/11/2021    Edema of both feet 01/23/2020    Electrolyte abnormality 10/05/2024    Gastric bypass status for obesity     HNP (herniated nucleus pulposus), lumbar 02/23/2021    Hyperlipidemia     Hypertension     Hypokalemia 11/14/2021    Impaired fasting glucose     last assessed 5/10/17    Knee sprain, bilateral 06/14/2018    Leg cramps 06/16/2022    Obesity     Status post cholecystectomy 02/17/2024    Uncontrolled atrial flutter (HCC) 06/18/2024    Viral gastroenteritis     last assessed 11/4/16       Review of Systems   Constitutional:  Negative for activity change and unexpected weight change.   HENT: Negative.     Eyes: Negative.    Respiratory:  Positive for shortness of breath.         Chronic    Improved since d/c   Cardiovascular:  Positive for palpitations. Negative for chest pain and leg swelling.        Palpitations When walking long distances   Endocrine: Negative.    Genitourinary:  Positive for decreased urine volume.   Musculoskeletal: Negative.    Skin: Negative.    Allergic/Immunologic: Negative.    Neurological:  Positive for dizziness.   Hematological: Negative.    Psychiatric/Behavioral: Negative.         Allergies   Allergen Reactions    Azithromycin Other (See Comments)     Shaky, uneasy feeling     Bactrim [Sulfamethoxazole-Trimethoprim] Other (See Comments)     shakiness         Current Outpatient Medications:     Accu-Chek FastClix Lancets MISC, Test blood sugar twice daily, Disp: 200 each, Rfl: 3    acetaminophen (TYLENOL) 325 mg tablet, Take 2 tablets (650 mg total) by mouth every 6 (six) hours as needed for mild pain, headaches or fever, Disp: , Rfl:     albuterol (PROVENTIL HFA,VENTOLIN HFA) 90 mcg/act inhaler, Inhale 2 puffs every 4 (four) hours as needed for wheezing, Disp: 18 g, Rfl: 0    apixaban (Eliquis) 5 mg, Take 1 tablet (5 mg total) by mouth 2 (two) times a day, Disp: 180 tablet, Rfl: 2    atorvastatin (LIPITOR) 10 mg tablet, TAKE 1  TABLET BY MOUTH EVERY DAY, Disp: 30 tablet, Rfl: 5    Blood Glucose Monitoring Suppl (Accu-Chek Guide) w/Device KIT, Use 2 (two) times a day Test blood sugar twice daily, Disp: 1 kit, Rfl: 0    budesonide-formoterol (Symbicort) 160-4.5 mcg/act inhaler, Inhale 2 puffs 2 (two) times a day Rinse mouth after use., Disp: 30.6 g, Rfl: 2    bumetanide (BUMEX) 2 mg tablet, TAKE 3 TABLETS (6 MG TOTAL) BY MOUTH 3 (THREE) TIMES A DAY, Disp: 810 tablet, Rfl: 1    cyanocobalamin (VITAMIN B-12) 1000 MCG tablet, Take 1 tablet (1,000 mcg total) by mouth daily, Disp: 30 tablet, Rfl: 0    Empagliflozin (JARDIANCE) 10 MG TABS tablet, Take 1 tablet (10 mg total) by mouth daily, Disp: 30 tablet, Rfl: 11    famotidine (PEPCID) 20 mg tablet, Take 1 tablet (20 mg total) by mouth if needed for heartburn As needed twice a day, Disp: , Rfl:     fluticasone (FLONASE) 50 mcg/act nasal spray, 1 spray into each nostril 2 (two) times a day, Disp: , Rfl: 0    loratadine (CLARITIN) 10 mg tablet, Take 1 tablet (10 mg total) by mouth if needed for allergies As needed daily, Disp: , Rfl:     magnesium Oxide (MAG-OX) 400 mg TABS, Take 1 tablet (400 mg total) by mouth 2 (two) times a day, Disp: 60 tablet, Rfl: 0    metolazone (ZAROXOLYN) 2.5 mg tablet, Take 2 tablets (5 mg total) by mouth 3 (three) times a week On Tuesday Thursday and Saturday with potassium 40 mill equivalents Take 20-30 minutes before Bumex dose and with additional potassium, Disp: 180 tablet, Rfl: 0    metoprolol succinate (TOPROL-XL) 50 mg 24 hr tablet, TAKE 1 TABLET BY MOUTH EVERY DAY, Disp: 90 tablet, Rfl: 1    nitroglycerin (NITROSTAT) 0.4 mg SL tablet, Place 1 tablet (0.4 mg total) under the tongue every 5 (five) minutes as needed for chest pain for up to 50 doses, Disp: 50 tablet, Rfl: 0    potassium chloride (Klor-Con M20) 20 mEq tablet, Take 2 tablets (40 mEq total) by mouth 3 (three) times a day with meals, Disp: , Rfl:     pregabalin (LYRICA) 50 mg capsule, Take 1 caps. at  bedtime, Disp: 90 capsule, Rfl: 1    sitaGLIPtin (Januvia) 100 mg tablet, TAKE 1/2 TABLET BY MOUTH EVERY DAY, Disp: 15 tablet, Rfl: 5    sodium chloride (OCEAN) 0.65 % nasal spray, 1 spray into each nostril every hour as needed for congestion, Disp: 37.5 mL, Rfl: 0    spironolactone (ALDACTONE) 25 mg tablet, Take 1 tablet (25 mg total) by mouth 2 (two) times a day, Disp: 60 tablet, Rfl: 0    montelukast (SINGULAIR) 10 mg tablet, Take 1 tablet (10 mg total) by mouth daily at bedtime, Disp: 90 tablet, Rfl: 1    tiotropium (Spiriva Respimat) 1.25 MCG/ACT AERS inhaler, Inhale 2 puffs daily, Disp: 4 g, Rfl: 5    Social History     Socioeconomic History    Marital status: /Civil Union     Spouse name: Not on file    Number of children: Not on file    Years of education: Not on file    Highest education level: Not on file   Occupational History    Not on file   Tobacco Use    Smoking status: Former     Current packs/day: 1.00     Average packs/day: 1 pack/day for 5.0 years (5.0 ttl pk-yrs)     Types: Pipe, Cigars, Cigarettes     Start date: 2016     Quit date: 1/1/2021     Passive exposure: Never    Smokeless tobacco: Former    Tobacco comments:     cigar once a day   Vaping Use    Vaping status: Never Used   Substance and Sexual Activity    Alcohol use: Yes     Alcohol/week: 2.0 standard drinks of alcohol     Types: 2 Shots of liquor per week     Comment: social    Drug use: No    Sexual activity: Yes     Partners: Female     Birth control/protection: None   Other Topics Concern    Not on file   Social History Narrative    Not on file     Social Drivers of Health     Financial Resource Strain: Not on file   Food Insecurity: No Food Insecurity (4/19/2025)    Nursing - Inadequate Food Risk Classification     Worried About Running Out of Food in the Last Year: Sometimes true     Ran Out of Food in the Last Year: Never true     Ran Out of Food in the Last Year: Never true   Transportation Needs: No Transportation Needs  "(2025)    Nursing - Transportation Risk Classification     Lack of Transportation: Not on file     Lack of Transportation: No   Physical Activity: Not on file   Stress: Not on file   Social Connections: Not on file   Intimate Partner Violence: Unknown (2025)    Nursing IPS     Feels Physically and Emotionally Safe: Not on file     Physically Hurt by Someone: Not on file     Humiliated or Emotionally Abused by Someone: Not on file     Physically Hurt by Someone: No     Hurt or Threatened by Someone: No   Housing Stability: Unknown (2025)    Nursing: Inadequate Housing Risk Classification     Has Housing: Not on file     Worried About Losing Housing: Not on file     Unable to Get Utilities: Not on file     Unable to Pay for Housing in the Last Year: No     Has Housin     Family History   Problem Relation Age of Onset    Stroke Mother     Hypertension Father     Cancer Father     COPD Father        Vitals:   Blood pressure 130/70, pulse (!) 106, height 6' 1\" (1.854 m), weight (!) 139 kg (306 lb 8 oz), SpO2 98%.    Wt Readings from Last 10 Encounters:   25 (!) 139 kg (306 lb 8 oz)   25 (!) 140 kg (308 lb 9.6 oz)   25 (!) 145 kg (319 lb 3.2 oz)   25 (!) 145 kg (319 lb 6.4 oz)   25 (!) 147 kg (325 lb)   25 (!) 141 kg (309 lb 15.5 oz)   03/10/25 (!) 151 kg (333 lb 3.2 oz)   25 (!) 143 kg (315 lb)   25 (!) 143 kg (315 lb 0.6 oz)   24 (!) 147 kg (323 lb 6.4 oz)     Vitals:    25 0841   BP: 130/70   BP Location: Right arm   Patient Position: Sitting   Cuff Size: Large   Pulse: (!) 106   SpO2: 98%   Weight: (!) 139 kg (306 lb 8 oz)   Height: 6' 1\" (1.854 m)       Physical Exam  Constitutional:       Appearance: Normal appearance.   Neck:      Vascular: No JVD.   Cardiovascular:      Rate and Rhythm: Normal rate and regular rhythm.      Heart sounds: S1 normal and S2 normal.   Pulmonary:      Effort: No respiratory distress.      Breath sounds: " Normal air entry. Decreased breath sounds present.   Musculoskeletal:      Right lower le+ Edema present.      Left lower le+ Edema present.   Skin:     General: Skin is warm and dry.   Neurological:      Mental Status: He is alert and oriented to person, place, and time. Mental status is at baseline.   Psychiatric:         Behavior: Behavior is cooperative.         Cognition and Memory: Cognition normal.       Labs & Results:  Lab Results   Component Value Date    WBC 6.57 2025    HGB 11.9 (L) 2025    HCT 37.3 2025    MCV 95 2025     2025     Lab Results   Component Value Date    SODIUM 133 (L) 2025    K 3.7 2025    CL 89 (L) 2025    CO2 30 2025    BUN 59 (H) 2025    CREATININE 2.16 (H) 2025    GLUC 209 (H) 2025    CALCIUM 8.8 2025     Lab Results   Component Value Date    INR 0.96 2025    INR 1.29 (H) 10/12/2023    PROTIME 13.1 2025    PROTIME 15.9 (H) 10/12/2023     Lab Results   Component Value Date     (H) 2025        RODRIGUE Sahu

## 2025-05-02 ENCOUNTER — OFFICE VISIT (OUTPATIENT)
Dept: CARDIOLOGY CLINIC | Facility: CLINIC | Age: 60
End: 2025-05-02
Payer: COMMERCIAL

## 2025-05-02 VITALS
BODY MASS INDEX: 40.62 KG/M2 | HEART RATE: 106 BPM | SYSTOLIC BLOOD PRESSURE: 130 MMHG | OXYGEN SATURATION: 98 % | WEIGHT: 306.5 LBS | DIASTOLIC BLOOD PRESSURE: 70 MMHG | HEIGHT: 73 IN

## 2025-05-02 DIAGNOSIS — I50.32 CHRONIC HEART FAILURE WITH PRESERVED EJECTION FRACTION (HFPEF, >= 50%) (HCC): Primary | Chronic | ICD-10-CM

## 2025-05-02 PROCEDURE — 99214 OFFICE O/P EST MOD 30 MIN: CPT

## 2025-05-02 NOTE — PATIENT INSTRUCTIONS
Please weigh yourself every day (after emptying your bladder) and keep a detailed log of weights.     Contact Cardiology at [phone] if you gain 3+ lbs overnight or 5+ lbs in 5-7 days.    Limit daily sodium/salt intake to 2000 mg daily to prevent fluid retention.    Avoid canned foods, fast food/Chinese food, and processed meats (hot dogs, lunch meat, and sausage etc.). Caution with condiments.    Limit fluid intake to 2000 mL or 2 liters (about 60-65 ounces) daily.    Avoid electrolyte replacement drinks (such as Gatorade, Pedialyte, Propel, Liquid IV, etc.).  Bring complete list of medications and log of daily weights to your follow-up appointment.

## 2025-05-06 ENCOUNTER — TELEPHONE (OUTPATIENT)
Dept: CARDIOLOGY CLINIC | Facility: CLINIC | Age: 60
End: 2025-05-06

## 2025-05-06 ENCOUNTER — APPOINTMENT (OUTPATIENT)
Dept: LAB | Facility: CLINIC | Age: 60
End: 2025-05-06
Payer: COMMERCIAL

## 2025-05-06 DIAGNOSIS — I50.32 CHRONIC HEART FAILURE WITH PRESERVED EJECTION FRACTION (HFPEF, >= 50%) (HCC): Primary | ICD-10-CM

## 2025-05-06 LAB
CREAT UR-MCNC: 78.4 MG/DL
MICROALBUMIN UR-MCNC: 13.2 MG/L
MICROALBUMIN/CREAT 24H UR: 17 MG/G CREATININE (ref 0–30)

## 2025-05-06 PROCEDURE — 82043 UR ALBUMIN QUANTITATIVE: CPT

## 2025-05-06 PROCEDURE — 82570 ASSAY OF URINE CREATININE: CPT

## 2025-05-06 NOTE — TELEPHONE ENCOUNTER
Patient requesting an order for BMP that RODRIGUE Cruz mentioned he should get @ 5/2 OV.    CardioMems Since 4/27 Hsp DC:  4/28, 4/29, 4/30-No readings  5/1 - 13 5/2 - 12  5/3 & 5/4 - No readings  5/5 - 11 5/6 - 12

## 2025-05-06 NOTE — TELEPHONE ENCOUNTER
Since did not speak with patient 48 Hours after DC, completed the following assessment today.  Self-management/education and teach back:  Primary learner: Patient  Has patient given verbal consent for a virtual RN visit (for trial only, for 48 hour discharge call only): Patient agreed to talk on the phone but not a virtual RN visit today.  Following low sodium diet: Yes  Following fluid restriction: Following 2000 ml/day  Hospital discharge weight: 308 lb 9.6 oz  Weighing daily: Yes             Recording: No, but encouraged to do so & said he will keep a log  1st home weight   Did not have       Weight today: 307 lb  Monitoring symptoms: Yes  Any current symptoms: A little light headed & dizzy, started last week  Knows when to call provider: Yes  Medications reviewed and taking all as prescribed: Yes  Knows name of diuretic: Yes  Escalation plan:     Care Coordination:  Aware of heart failure hospital follow up and/or future cardiology follow up appointments: Had 1st F/U OV, 5/2.  Next OV 5/14.  Aware of PCP follow up appointment:Had to cancel appt & needs to reschedule  Transportation: Drives self  Social Support:  Insurance/financial concerns:  Home health care:   Health literacy:  Engagement:    Additional comments:

## 2025-05-07 ENCOUNTER — CLINICAL SUPPORT (OUTPATIENT)
Dept: CARDIOLOGY CLINIC | Facility: CLINIC | Age: 60
End: 2025-05-07
Payer: COMMERCIAL

## 2025-05-07 ENCOUNTER — APPOINTMENT (OUTPATIENT)
Dept: LAB | Facility: CLINIC | Age: 60
End: 2025-05-07
Payer: COMMERCIAL

## 2025-05-07 DIAGNOSIS — I50.32 CHRONIC HEART FAILURE WITH PRESERVED EJECTION FRACTION (HFPEF, >= 50%) (HCC): ICD-10-CM

## 2025-05-07 DIAGNOSIS — I50.32 CHRONIC HEART FAILURE WITH PRESERVED EJECTION FRACTION (HFPEF, >= 50%) (HCC): Primary | ICD-10-CM

## 2025-05-07 LAB
ANION GAP SERPL CALCULATED.3IONS-SCNC: 15 MMOL/L (ref 4–13)
BUN SERPL-MCNC: 60 MG/DL (ref 5–25)
CALCIUM SERPL-MCNC: 9.1 MG/DL (ref 8.4–10.2)
CHLORIDE SERPL-SCNC: 83 MMOL/L (ref 96–108)
CO2 SERPL-SCNC: 35 MMOL/L (ref 21–32)
CREAT SERPL-MCNC: 2.79 MG/DL (ref 0.6–1.3)
GFR SERPL CREATININE-BSD FRML MDRD: 24 ML/MIN/1.73SQ M
GLUCOSE P FAST SERPL-MCNC: 215 MG/DL (ref 65–99)
POTASSIUM SERPL-SCNC: 3.8 MMOL/L (ref 3.5–5.3)
SODIUM SERPL-SCNC: 133 MMOL/L (ref 135–147)

## 2025-05-07 PROCEDURE — 80048 BASIC METABOLIC PNL TOTAL CA: CPT

## 2025-05-07 PROCEDURE — 93264 REM MNTR WRLS P-ART PRS SNR: CPT | Performed by: INTERNAL MEDICINE

## 2025-05-07 PROCEDURE — 36415 COLL VENOUS BLD VENIPUNCTURE: CPT

## 2025-05-08 ENCOUNTER — TELEPHONE (OUTPATIENT)
Dept: CARDIOLOGY CLINIC | Facility: CLINIC | Age: 60
End: 2025-05-08

## 2025-05-08 DIAGNOSIS — I50.32 CHRONIC HEART FAILURE WITH PRESERVED EJECTION FRACTION (HFPEF, >= 50%) (HCC): Primary | ICD-10-CM

## 2025-05-08 NOTE — TELEPHONE ENCOUNTER
Patient returned call & stated he is very concerned with his lab work, specifically   BUN - 60  CREAT - 2.79   Patient is asking if he should take his dose of Metolazone 5 mg today.  Stated if you advise to take Metolazone, he will take it around 12:30, about 30 minutes before Bumex 6 mg dose @ 1 PM.    Please advise.

## 2025-05-08 NOTE — TELEPHONE ENCOUNTER
Received verbal orders from RODRIGUE Cruz. Ordered for patient  to get repeat BMP on Monday, 512, orders for patient to hold second daily dose of Spironolactone 25 mg, until after BMP resulted.   Patient may take one daily dose of Spironolactone 25 mg.  Nicko ordered for patient to hold Metolazone dose for today & Saturday.    Called patient with RODRIGUE Cruz orders & instructions. Patient verbalized understanding.  Instructed patient to call office with the 1 st sign of any CHF symptoms. Currently, patient stated he has no CHF symptoms.

## 2025-05-12 ENCOUNTER — APPOINTMENT (OUTPATIENT)
Dept: LAB | Facility: CLINIC | Age: 60
End: 2025-05-12
Payer: COMMERCIAL

## 2025-05-12 DIAGNOSIS — I50.32 CHRONIC HEART FAILURE WITH PRESERVED EJECTION FRACTION (HFPEF, >= 50%) (HCC): ICD-10-CM

## 2025-05-12 LAB
ANION GAP SERPL CALCULATED.3IONS-SCNC: 17 MMOL/L (ref 4–13)
BUN SERPL-MCNC: 41 MG/DL (ref 5–25)
CALCIUM SERPL-MCNC: 8.5 MG/DL (ref 8.4–10.2)
CHLORIDE SERPL-SCNC: 87 MMOL/L (ref 96–108)
CO2 SERPL-SCNC: 30 MMOL/L (ref 21–32)
CREAT SERPL-MCNC: 1.9 MG/DL (ref 0.6–1.3)
GFR SERPL CREATININE-BSD FRML MDRD: 38 ML/MIN/1.73SQ M
GLUCOSE P FAST SERPL-MCNC: 191 MG/DL (ref 65–99)
POTASSIUM SERPL-SCNC: 3.4 MMOL/L (ref 3.5–5.3)
SODIUM SERPL-SCNC: 134 MMOL/L (ref 135–147)

## 2025-05-12 PROCEDURE — 36415 COLL VENOUS BLD VENIPUNCTURE: CPT

## 2025-05-12 PROCEDURE — 80048 BASIC METABOLIC PNL TOTAL CA: CPT

## 2025-05-12 NOTE — PROGRESS NOTES
Advanced Heart Failure Outpatient Visit    Mesfin Cano 59 y.o. male   MRN: [id number]  Encounter: [id number]    Assessment:  Patient Active Problem List    Diagnosis Date Noted    Iron deficiency 10/07/2024    Onychomycosis 10/05/2024    Moderate persistent asthma without complication 06/18/2024    Type 2 diabetes mellitus without complication, without long-term current use of insulin (HCC) 12/23/2023    Anemia due to chronic kidney disease 10/16/2023    Chronic heart failure with preserved ejection fraction (HFpEF, >= 50%) (HCC) 04/10/2023    Diabetic polyneuropathy associated with type 2 diabetes mellitus (HCC) 12/20/2022    Stage 3 chronic kidney disease (HCC) 09/16/2022    Paroxysmal atrial fibrillation (HCC) 03/01/2022    Foraminal stenosis of lumbar region 02/23/2021    VICKY (obstructive sleep apnea)     Hyperlipidemia 04/18/2019    Primary osteoarthritis of both knees 06/14/2018    Irritable bowel syndrome with diarrhea 01/30/2018    Primary hypertension 01/30/2018    Vitamin B12 deficiency 03/08/2016    Vitamin D deficiency 03/08/2016    Morbid obesity (HCC) 02/23/2016    Pancreatic cyst 04/14/2025    Liver lesion, left lobe 01/08/2025    Serum total bilirubin elevated 01/05/2025    Presence of CardioMEMS HF system 03/09/2022       Today's Plan:  Well compensated on exam today. Continue current diuretic dosing.  Advised to take 3-4 extra potassium tablets this week given hypokalemia on recent lab work.   RTC in 3-4 weeks.     Plan:  Chronic HFpEF; LVEF 55%              RHC / MEMS calibration 10/11/2023: CardioMEMS data correlates to RHC.    Weight of 306 lbs on 05/02. Today, weighs 308 lbs.    Most recent BMP from 05/12/2025: sodium 134; potassium 3.4; BUN 41; creatinine 1.90; eGFR 38.    Guideline Directed Medical Therapy:  --Aldosterone Antagonist: spironolactone 25 mg BID.   --SGLT2 Inhibitor: empagliflozin 10 mg daily.      Volume Management:  --Diuretic:  Bumex 6 mg TID with metolazone 5 mg TTSa.   --K  "supplementation: potassium 40 mEq TID.                  Advanced Therapies:              --CardioMEMS: implanted 03/2022. Goal PAd of 12 mmHg.     Atrial fibrillation, parosxysmal              [id number] = 3 (HF, HTN, DM).              Anticoagulation on Eliquis.               Rate control: metoprolol succinate 50 mg daily.              Rhythm control: No.     Chronic kidney disease, stage IIIa  Hypertension  Hyperlipidemia  Asthma, severe persistent: follows with Dr. Noonan as outpatient.   Obstructive sleep apnea  Diabetes mellitus, type II  History of gastric bypass (Samuel-en-Y) surgery  History of tobacco abuse    HPI:   Mesfin Cano is a 59-year-old man with a PMH as above who presents to the office for follow-up. Follows with Dr. Wilcox.     05/02/2025  with TA: \"Pt states he is feeling well overall.  Weights down at home to 306 lbs.  Reports TELLES is at baseline, improved since hospitalization. Took metolazone Tues and Thurs this week with diminished UO.  Encouraged to increase fluid intake for the next couple days as he may be slightly dry.  Pt also reports new dizziness this past week.  He is obtaining blood work today--will call with abnormal results.\"    05/14/2025: Patient presents for follow-up. Feeling well today. No current cardiac complaints today. Denies lightheadedness, dizziness, chest pain, palpitations, SOB at rest, LE swelling, PND, orthopnea. Patient did hold metolazone doses as advised due to creatinine bump on recent labs.    Past Medical History:   Diagnosis Date    Abnormal stress test 06/26/2024    Acute gastritis without hemorrhage     last assessed 3/24/17    Acute on chronic diastolic heart failure (HCC) 01/04/2025    Asthma     Atrial fibrillation (HCC)     Bilateral leg edema 02/19/2020    CHF (congestive heart failure) (HCC)     CHF, acute on chronic (HCC) 03/12/2025    Coronary artery disease     Daytime sleepiness 07/17/2019    Diabetes mellitus (HCC)     Dyspnea on exertion 10/11/2021 "    Edema of both feet 01/23/2020    Electrolyte abnormality 10/05/2024    Gastric bypass status for obesity     HNP (herniated nucleus pulposus), lumbar 02/23/2021    Hyperlipidemia     Hypertension     Hypokalemia 11/14/2021    Impaired fasting glucose     last assessed 5/10/17    Knee sprain, bilateral 06/14/2018    Leg cramps 06/16/2022    Obesity     Status post cholecystectomy 02/17/2024    Uncontrolled atrial flutter (HCC) 06/18/2024    Viral gastroenteritis     last assessed 11/4/16     Review of Systems   Constitutional:  Negative for activity change, appetite change, fatigue and unexpected weight change.   Respiratory:  Negative for cough, chest tightness and shortness of breath.    Cardiovascular:  Negative for chest pain, palpitations and leg swelling.   Gastrointestinal:  Negative for abdominal distention.   Genitourinary:  Negative for decreased urine volume.   Neurological:  Negative for dizziness, syncope, weakness and light-headedness.   Psychiatric/Behavioral:  Negative for confusion and sleep disturbance. The patient is not nervous/anxious.        Allergies   Allergen Reactions    Azithromycin Other (See Comments)     Shaky, uneasy feeling     Bactrim [Sulfamethoxazole-Trimethoprim] Other (See Comments)     shakiness         Current Outpatient Medications:     Accu-Chek FastClix Lancets MISC, Test blood sugar twice daily, Disp: 200 each, Rfl: 3    acetaminophen (TYLENOL) 325 mg tablet, Take 2 tablets (650 mg total) by mouth every 6 (six) hours as needed for mild pain, headaches or fever, Disp: , Rfl:     albuterol (PROVENTIL HFA,VENTOLIN HFA) 90 mcg/act inhaler, Inhale 2 puffs every 4 (four) hours as needed for wheezing, Disp: 18 g, Rfl: 0    apixaban (Eliquis) 5 mg, Take 1 tablet (5 mg total) by mouth 2 (two) times a day, Disp: 180 tablet, Rfl: 2    atorvastatin (LIPITOR) 10 mg tablet, TAKE 1 TABLET BY MOUTH EVERY DAY, Disp: 30 tablet, Rfl: 5    Blood Glucose Monitoring Suppl (Accu-Chek Guide)  w/Device KIT, Use 2 (two) times a day Test blood sugar twice daily, Disp: 1 kit, Rfl: 0    budesonide-formoterol (Symbicort) 160-4.5 mcg/act inhaler, Inhale 2 puffs 2 (two) times a day Rinse mouth after use., Disp: 30.6 g, Rfl: 2    bumetanide (BUMEX) 2 mg tablet, TAKE 3 TABLETS (6 MG TOTAL) BY MOUTH 3 (THREE) TIMES A DAY, Disp: 810 tablet, Rfl: 1    cyanocobalamin (VITAMIN B-12) 1000 MCG tablet, Take 1 tablet (1,000 mcg total) by mouth daily, Disp: 30 tablet, Rfl: 0    Empagliflozin (JARDIANCE) 10 MG TABS tablet, Take 1 tablet (10 mg total) by mouth daily, Disp: 30 tablet, Rfl: 11    famotidine (PEPCID) 20 mg tablet, Take 1 tablet (20 mg total) by mouth if needed for heartburn As needed twice a day, Disp: , Rfl:     fluticasone (FLONASE) 50 mcg/act nasal spray, 1 spray into each nostril 2 (two) times a day, Disp: , Rfl: 0    loratadine (CLARITIN) 10 mg tablet, Take 1 tablet (10 mg total) by mouth if needed for allergies As needed daily, Disp: , Rfl:     magnesium Oxide (MAG-OX) 400 mg TABS, Take 1 tablet (400 mg total) by mouth 2 (two) times a day, Disp: 60 tablet, Rfl: 0    metolazone (ZAROXOLYN) 2.5 mg tablet, Take 2 tablets (5 mg total) by mouth 3 (three) times a week On Tuesday Thursday and Saturday with potassium 40 mill equivalents Take 20-30 minutes before Bumex dose and with additional potassium, Disp: 180 tablet, Rfl: 0    metoprolol succinate (TOPROL-XL) 50 mg 24 hr tablet, TAKE 1 TABLET BY MOUTH EVERY DAY, Disp: 90 tablet, Rfl: 1    montelukast (SINGULAIR) 10 mg tablet, Take 1 tablet (10 mg total) by mouth daily at bedtime, Disp: 90 tablet, Rfl: 1    nitroglycerin (NITROSTAT) 0.4 mg SL tablet, Place 1 tablet (0.4 mg total) under the tongue every 5 (five) minutes as needed for chest pain for up to 50 doses, Disp: 50 tablet, Rfl: 0    potassium chloride (Klor-Con M20) 20 mEq tablet, Take 2 tablets (40 mEq total) by mouth 3 (three) times a day with meals, Disp: , Rfl:     pregabalin (LYRICA) 50 mg capsule,  Take 1 caps. at bedtime, Disp: 90 capsule, Rfl: 1    sitaGLIPtin (Januvia) 100 mg tablet, TAKE 1/2 TABLET BY MOUTH EVERY DAY, Disp: 15 tablet, Rfl: 5    sodium chloride (OCEAN) 0.65 % nasal spray, 1 spray into each nostril every hour as needed for congestion, Disp: 37.5 mL, Rfl: 0    spironolactone (ALDACTONE) 25 mg tablet, Take 1 tablet (25 mg total) by mouth 2 (two) times a day, Disp: 60 tablet, Rfl: 0    tiotropium (Spiriva Respimat) 1.25 MCG/ACT AERS inhaler, Inhale 2 puffs daily, Disp: 4 g, Rfl: 5    Social History     Socioeconomic History    Marital status: /Civil Union     Spouse name: Not on file    Number of children: Not on file    Years of education: Not on file    Highest education level: Not on file   Occupational History    Not on file   Tobacco Use    Smoking status: Former     Current packs/day: 1.00     Average packs/day: 1 pack/day for 5.0 years (5.0 ttl pk-yrs)     Types: Pipe, Cigars, Cigarettes     Start date: 2016     Quit date: 1/1/2021     Passive exposure: Never    Smokeless tobacco: Former    Tobacco comments:     cigar once a day   Vaping Use    Vaping status: Never Used   Substance and Sexual Activity    Alcohol use: Yes     Alcohol/week: 2.0 standard drinks of alcohol     Types: 2 Shots of liquor per week     Comment: social    Drug use: No    Sexual activity: Yes     Partners: Female     Birth control/protection: None   Other Topics Concern    Not on file   Social History Narrative    Not on file     Social Drivers of Health     Financial Resource Strain: Not on file   Food Insecurity: No Food Insecurity (4/19/2025)    Nursing - Inadequate Food Risk Classification     Worried About Running Out of Food in the Last Year: Sometimes true     Ran Out of Food in the Last Year: Never true     Ran Out of Food in the Last Year: Never true   Transportation Needs: No Transportation Needs (4/19/2025)    Nursing - Transportation Risk Classification     Lack of Transportation: Not on file      "Lack of Transportation: No   Physical Activity: Not on file   Stress: Not on file   Social Connections: Not on file   Intimate Partner Violence: Unknown (2025)    Nursing IPS     Feels Physically and Emotionally Safe: Not on file     Physically Hurt by Someone: Not on file     Humiliated or Emotionally Abused by Someone: Not on file     Physically Hurt by Someone: No     Hurt or Threatened by Someone: No   Housing Stability: Unknown (2025)    Nursing: Inadequate Housing Risk Classification     Has Housing: Not on file     Worried About Losing Housing: Not on file     Unable to Get Utilities: Not on file     Unable to Pay for Housing in the Last Year: No     Has Housin     Family History   Problem Relation Age of Onset    Stroke Mother     Hypertension Father     Cancer Father     COPD Father        Vitals:   Blood pressure 114/76, pulse 93, height 6' 1\" (1.854 m), weight (!) 140 kg (308 lb), SpO2 99%.    Wt Readings from Last 10 Encounters:   25 (!) 140 kg (308 lb)   25 (!) 139 kg (306 lb 8 oz)   25 (!) 140 kg (308 lb 9.6 oz)   25 (!) 145 kg (319 lb 3.2 oz)   25 (!) 145 kg (319 lb 6.4 oz)   25 (!) 147 kg (325 lb)   25 (!) 141 kg (309 lb 15.5 oz)   03/10/25 (!) 151 kg (333 lb 3.2 oz)   25 (!) 143 kg (315 lb)   25 (!) 143 kg (315 lb 0.6 oz)     Vitals:    25 0835   BP: 114/76   BP Location: Left arm   Patient Position: Sitting   Cuff Size: Large   Pulse: 93   SpO2: 99%   Weight: (!) 140 kg (308 lb)   Height: 6' 1\" (1.854 m)       Physical Exam  Vitals reviewed.   Constitutional:       General: He is awake. He is not in acute distress.     Appearance: Normal appearance. He is well-developed and overweight. He is not toxic-appearing or diaphoretic.   HENT:      Head: Normocephalic.      Nose: Nose normal.   Neck:      Vascular: No JVD.     Cardiovascular:      Rate and Rhythm: Normal rate and regular rhythm. No extrasystoles are " present.  Pulmonary:      Effort: Pulmonary effort is normal. No tachypnea, bradypnea or respiratory distress.      Breath sounds: Normal air entry. No decreased air movement. No wheezing or rales.   Abdominal:      General: There is no distension.      Palpations: Abdomen is soft.     Musculoskeletal:      Cervical back: Neck supple.      Right lower leg: No edema.      Left lower leg: No edema.     Skin:     General: Skin is warm and dry.      Coloration: Skin is pale. Skin is not jaundiced.     Neurological:      Mental Status: He is alert and oriented to person, place, and time.     Psychiatric:         Attention and Perception: Attention normal.         Mood and Affect: Mood and affect normal.         Speech: Speech normal.         Behavior: Behavior normal. Behavior is cooperative.         Thought Content: Thought content normal.       Labs & Results:  Lab Results   Component Value Date    WBC 6.57 04/21/2025    HGB 11.9 (L) 04/21/2025    HCT 37.3 04/21/2025    MCV 95 04/21/2025     04/21/2025     Lab Results   Component Value Date    SODIUM 134 (L) 05/12/2025    K 3.4 (L) 05/12/2025    CL 87 (L) 05/12/2025    CO2 30 05/12/2025    BUN 41 (H) 05/12/2025    CREATININE 1.90 (H) 05/12/2025    GLUC 209 (H) 04/27/2025    CALCIUM 8.5 05/12/2025     Lab Results   Component Value Date    INR 0.96 01/05/2025    INR 1.29 (H) 10/12/2023    PROTIME 13.1 01/05/2025    PROTIME 15.9 (H) 10/12/2023     Lab Results   Component Value Date     (H) 04/19/2025      Aster García PA-C

## 2025-05-14 ENCOUNTER — OFFICE VISIT (OUTPATIENT)
Dept: CARDIOLOGY CLINIC | Facility: CLINIC | Age: 60
End: 2025-05-14
Payer: COMMERCIAL

## 2025-05-14 ENCOUNTER — TELEPHONE (OUTPATIENT)
Dept: CARDIOLOGY CLINIC | Facility: CLINIC | Age: 60
End: 2025-05-14

## 2025-05-14 VITALS
SYSTOLIC BLOOD PRESSURE: 114 MMHG | HEART RATE: 93 BPM | HEIGHT: 73 IN | OXYGEN SATURATION: 99 % | BODY MASS INDEX: 40.82 KG/M2 | WEIGHT: 308 LBS | DIASTOLIC BLOOD PRESSURE: 76 MMHG

## 2025-05-14 DIAGNOSIS — I48.0 PAROXYSMAL ATRIAL FIBRILLATION (HCC): Chronic | ICD-10-CM

## 2025-05-14 DIAGNOSIS — I50.32 CHRONIC HEART FAILURE WITH PRESERVED EJECTION FRACTION (HFPEF, >= 50%) (HCC): Primary | Chronic | ICD-10-CM

## 2025-05-14 PROBLEM — I50.33 ACUTE ON CHRONIC DIASTOLIC CHF (CONGESTIVE HEART FAILURE) (HCC): Status: RESOLVED | Noted: 2025-01-04 | Resolved: 2025-05-14

## 2025-05-14 PROCEDURE — 99214 OFFICE O/P EST MOD 30 MIN: CPT | Performed by: PHYSICIAN ASSISTANT

## 2025-05-14 NOTE — TELEPHONE ENCOUNTER
F/U call to patient. Left detailed message & c/b number with request for patient to return call to nurse to review CardioMems, today's OV instructions, hospital F/U.

## 2025-05-14 NOTE — PATIENT INSTRUCTIONS
Continue current Bumex dose.   Take an extra 3-4 tablets of potassium this week.     Please weigh yourself every day (after emptying your bladder) and keep a detailed log of weights.   Contact Cardiology at [phone] if you gain 3+ lbs overnight or 5+ lbs in 5-7 days.  Limit daily sodium/salt intake to 2000 mg daily to prevent fluid retention.  Avoid canned foods, fast food/Chinese food, and processed meats (hot dogs, lunch meat, and sausage etc.). Caution with condiments.  Limit fluid intake to 2000 mL or 2 liters (about 60-65 ounces) daily.  Avoid electrolyte replacement drinks (such as Gatorade, Pedialyte, Propel, Liquid IV, etc.).  Bring complete list of medications and log of daily weights to your follow-up appointment.

## 2025-05-19 ENCOUNTER — TELEPHONE (OUTPATIENT)
Dept: CARDIOLOGY CLINIC | Facility: CLINIC | Age: 60
End: 2025-05-19

## 2025-05-19 ENCOUNTER — HOSPITAL ENCOUNTER (EMERGENCY)
Facility: HOSPITAL | Age: 60
Discharge: HOME | End: 2025-05-19
Attending: EMERGENCY MEDICINE
Payer: COMMERCIAL

## 2025-05-19 ENCOUNTER — TELEPHONE (OUTPATIENT)
Dept: FAMILY MEDICINE CLINIC | Facility: CLINIC | Age: 60
End: 2025-05-19

## 2025-05-19 VITALS
OXYGEN SATURATION: 94 % | HEART RATE: 85 BPM | RESPIRATION RATE: 18 BRPM | SYSTOLIC BLOOD PRESSURE: 179 MMHG | TEMPERATURE: 97.6 F | DIASTOLIC BLOOD PRESSURE: 72 MMHG

## 2025-05-19 DIAGNOSIS — L40.50 PSORIATIC ARTHRITIS (HCC): Primary | ICD-10-CM

## 2025-05-19 PROCEDURE — 96372 THER/PROPH/DIAG INJ SC/IM: CPT

## 2025-05-19 PROCEDURE — 99284 EMERGENCY DEPT VISIT MOD MDM: CPT | Performed by: EMERGENCY MEDICINE

## 2025-05-19 PROCEDURE — 99283 EMERGENCY DEPT VISIT LOW MDM: CPT

## 2025-05-19 RX ORDER — COLCHICINE 0.6 MG/1
1.2 TABLET ORAL ONCE
Status: CANCELLED | OUTPATIENT
Start: 2025-05-19 | End: 2025-05-19

## 2025-05-19 RX ORDER — KETOROLAC TROMETHAMINE 30 MG/ML
15 INJECTION, SOLUTION INTRAMUSCULAR; INTRAVENOUS ONCE
Status: COMPLETED | OUTPATIENT
Start: 2025-05-19 | End: 2025-05-19

## 2025-05-19 RX ORDER — PREDNISONE 20 MG/1
TABLET ORAL
Qty: 11 TABLET | Refills: 0 | Status: SHIPPED | OUTPATIENT
Start: 2025-05-19 | End: 2025-05-28

## 2025-05-19 RX ORDER — ACETAMINOPHEN 325 MG/1
975 TABLET ORAL ONCE
Status: COMPLETED | OUTPATIENT
Start: 2025-05-19 | End: 2025-05-19

## 2025-05-19 RX ORDER — ACETAMINOPHEN 500 MG
1000 TABLET ORAL EVERY 8 HOURS
Qty: 42 TABLET | Refills: 0 | Status: SHIPPED | OUTPATIENT
Start: 2025-05-19 | End: 2025-05-26

## 2025-05-19 RX ORDER — PREDNISONE 20 MG/1
80 TABLET ORAL ONCE
Status: COMPLETED | OUTPATIENT
Start: 2025-05-19 | End: 2025-05-19

## 2025-05-19 RX ADMIN — KETOROLAC TROMETHAMINE 15 MG: 30 INJECTION, SOLUTION INTRAMUSCULAR; INTRAVENOUS at 18:38

## 2025-05-19 RX ADMIN — ACETAMINOPHEN 975 MG: 325 TABLET ORAL at 18:38

## 2025-05-19 RX ADMIN — PREDNISONE 80 MG: 20 TABLET ORAL at 18:38

## 2025-05-19 NOTE — TELEPHONE ENCOUNTER
Patient called office & stated he has large bump on his right index finger & a large bump on his right wrist.   Stated with in the last hour, he can hardly move his right arm.  Stated he called his PCP who advised he go to the ED.  Patient stated he will be going to the ED. Stated he will be calling his wife & either she will take him or he will call the squad.

## 2025-05-19 NOTE — DISCHARGE INSTRUCTIONS
You were seen today for shoulder pain and arm pain    Likely you have some arthritic inflammation in your joints    Please take the medications as prescribed    Follow-up with your family doctor in 2 days    Please return to the ER if you have any fever, chills, worsening pain, swelling, redness, numbness, weakness, any new symptoms    You may recieve a survey after you visit here in the Emergency Department. We strive for 5 stars. If you have any questions or concerns about the care you recieved here today please feel free to call us at [phone]

## 2025-05-19 NOTE — ED ATTENDING ATTESTATION
"5/19/2025  I, Aakash Crum MD, saw and evaluated the patient. I have discussed the patient with the resident/non-physician practitioner and agree with the resident's/non-physician practitioner's findings, Plan of Care, and MDM as documented in the resident's/non-physician practitioner's note, except where noted. All available labs and Radiology studies were reviewed.  I was present for key portions of any procedure(s) performed by the resident/non-physician practitioner and I was immediately available to provide assistance.       At this point I agree with the current assessment done in the Emergency Department.  I have conducted an independent evaluation of this patient a history and physical is as follows:    ED Course  ED Course as of 05/19/25 1811   Mon May 19, 2025   1756 Per resident h&p 61 YO M presents for acute exacerbation of pain of RUE; tender with any movement; RUE is not swollen; I/P gout v. Pseudogout; progression over week; NSAIDs; colchicine and prednisone     Emergency Department Note- Mesfin Cano 60 y.o. male MRN: [id number]    Unit/Bed#: RW 03 Encounter: [id number]    Mesfin Cano is a 60 y.o. male who presents with   Chief Complaint   Patient presents with    Arm Pain     Pt c/o arm pain and difficulty moving arm since this morning. Has had a \"bump\" on arm that they believe might be gout.          History of Present Illness   HPI:  Mesfin Cano is a 60 y.o. male who presents for evaluation of:  Abrupt onset of right upper extremity pain extending from his hand to his shoulder.  The pain is aching and impairing his use of the arm.  He denies any trauma to the area.  Movement provokes the pain while rest somewhat palliates the pain.  Pain is concerned that he may have gout.  He has noted a lump over his right index finger.  He is right-handed.  He denies radiation of the discomfort to his right scapula or to his neck.    Review of Systems   Constitutional:  Negative for fatigue and fever. "   HENT:  Negative for congestion and sore throat.    Respiratory:  Negative for cough and shortness of breath.    Cardiovascular:  Negative for chest pain and palpitations.   Gastrointestinal:  Negative for abdominal pain and nausea.   Genitourinary:  Negative for flank pain and frequency.   Neurological:  Negative for light-headedness and headaches.   Psychiatric/Behavioral:  Negative for dysphoric mood and hallucinations.    All other systems reviewed and are negative.      Historical Information   Past Medical History:   Diagnosis Date    Abnormal stress test 06/26/2024    Acute gastritis without hemorrhage     last assessed 3/24/17    Acute on chronic diastolic heart failure (HCC) 01/04/2025    Asthma     Atrial fibrillation ([facility])     Bilateral leg edema 02/19/2020    CHF (congestive heart failure) ([facility])     CHF, acute on chronic (HCC) 03/12/2025    Coronary artery disease     Daytime sleepiness 07/17/2019    Diabetes mellitus ([facility])     Dyspnea on exertion 10/11/2021    Edema of both feet 01/23/2020    Electrolyte abnormality 10/05/2024    Gastric bypass status for obesity     HNP (herniated nucleus pulposus), lumbar 02/23/2021    Hyperlipidemia     Hypertension     Hypokalemia 11/14/2021    Impaired fasting glucose     last assessed 5/10/17    Knee sprain, bilateral 06/14/2018    Leg cramps 06/16/2022    Obesity     Status post cholecystectomy 02/17/2024    Uncontrolled atrial flutter (HCC) 06/18/2024    Viral gastroenteritis     last assessed 11/4/16     Past Surgical History:   Procedure Laterality Date    CARDIAC CATHETERIZATION N/A 3/9/2022    Procedure: CARDIAC RHC W/ PRESSURE SENSOR;  Surgeon: Aminata Wilcox MD;  Location: [facility];  Service: Cardiology    CARDIAC CATHETERIZATION N/A 9/6/2022    Procedure: Cardiac catheterization;  Surgeon: Yolanda Del Rosario DO;  Location: [facility];  Service: Cardiology    CARDIAC CATHETERIZATION N/A 9/6/2022    Procedure: Cardiac Coronary  Angiogram;  Surgeon: Yolanda Del Rosario DO;  Location: [facility];  Service: Cardiology    CARDIAC CATHETERIZATION N/A 10/11/2023    Procedure: Cardiac RHC;  Surgeon: Yoel Darden MD;  Location: [facility];  Service: Cardiology    CARPAL TUNNEL RELEASE      bilateral    CHOLECYSTECTOMY LAPAROSCOPIC N/A 2/7/2024    Procedure: CHOLECYSTECTOMY LAPAROSCOPIC;  Surgeon: Nena Ferguson MD;  Location: [facility];  Service: General    GASTRIC BYPASS  2004    with han en y    HERNIA REPAIR      ventral inscisional    IR BIOPSY BONE MARROW  12/14/2023    LAPAROSCOPIC TRANSGASTRIC ACCESS FOR ERCP N/A 2/7/2024    Procedure: LAPAROSCOPIC TRANSGASTRIC ACCESS FOR ERCP;  Surgeon: Nena Ferguson MD;  Location: [facility];  Service: General    LAPAROSCOPIC TRANSGASTRIC ACCESS FOR ERCP N/A 2/7/2024    Procedure: ERCP, sphincterotomy, stone extraction;  Surgeon: Rey Ferguson MD;  Location: [facility];  Service: Gastroenterology    TONSILLECTOMY       Social History   Social History     Substance and Sexual Activity   Alcohol Use Yes    Alcohol/week: 2.0 standard drinks of alcohol    Types: 2 Shots of liquor per week    Comment: social     Social History     Substance and Sexual Activity   Drug Use No     Tobacco Use History[1]  Family History:   Family History   Problem Relation Age of Onset    Stroke Mother     Hypertension Father     Cancer Father     COPD Father        Meds/Allergies   PTA meds:   Prior to Admission Medications   Prescriptions Last Dose Informant Patient Reported? Taking?   Accu-Chek FastClix Lancets MISC  Self No No   Sig: Test blood sugar twice daily   Blood Glucose Monitoring Suppl (Accu-Chek Guide) w/Device KIT  Self No No   Sig: Use 2 (two) times a day Test blood sugar twice daily   Empagliflozin (JARDIANCE) 10 MG TABS tablet  Self No No   Sig: Take 1 tablet (10 mg total) by mouth daily   acetaminophen (TYLENOL) 325 mg tablet  Self No No   Sig: Take 2 tablets (650 mg total) by mouth  every 6 (six) hours as needed for mild pain, headaches or fever   albuterol (PROVENTIL HFA,VENTOLIN HFA) 90 mcg/act inhaler  Self No No   Sig: Inhale 2 puffs every 4 (four) hours as needed for wheezing   apixaban (Eliquis) 5 mg  Self No No   Sig: Take 1 tablet (5 mg total) by mouth 2 (two) times a day   atorvastatin (LIPITOR) 10 mg tablet  Self No No   Sig: TAKE 1 TABLET BY MOUTH EVERY DAY   budesonide-formoterol (Symbicort) 160-4.5 mcg/act inhaler  Self No No   Sig: Inhale 2 puffs 2 (two) times a day Rinse mouth after use.   bumetanide (BUMEX) 2 mg tablet  Self No No   Sig: TAKE 3 TABLETS (6 MG TOTAL) BY MOUTH 3 (THREE) TIMES A DAY   cyanocobalamin (VITAMIN B-12) 1000 MCG tablet  Self No No   Sig: Take 1 tablet (1,000 mcg total) by mouth daily   famotidine (PEPCID) 20 mg tablet  Self No No   Sig: Take 1 tablet (20 mg total) by mouth if needed for heartburn As needed twice a day   fluticasone (FLONASE) 50 mcg/act nasal spray  Self No No   Si spray into each nostril 2 (two) times a day   loratadine (CLARITIN) 10 mg tablet  Self No No   Sig: Take 1 tablet (10 mg total) by mouth if needed for allergies As needed daily   magnesium Oxide (MAG-OX) 400 mg TABS  Self No No   Sig: Take 1 tablet (400 mg total) by mouth 2 (two) times a day   metolazone (ZAROXOLYN) 2.5 mg tablet  Self No No   Sig: Take 2 tablets (5 mg total) by mouth 3 (three) times a week On  and Saturday with potassium 40 mill equivalents Take 20-30 minutes before Bumex dose and with additional potassium   metoprolol succinate (TOPROL-XL) 50 mg 24 hr tablet  Self No No   Sig: TAKE 1 TABLET BY MOUTH EVERY DAY   montelukast (SINGULAIR) 10 mg tablet  Self No No   Sig: Take 1 tablet (10 mg total) by mouth daily at bedtime   nitroglycerin (NITROSTAT) 0.4 mg SL tablet  Self No No   Sig: Place 1 tablet (0.4 mg total) under the tongue every 5 (five) minutes as needed for chest pain for up to 50 doses   potassium chloride (Klor-Con M20) 20 mEq  tablet  Self No No   Sig: Take 2 tablets (40 mEq total) by mouth 3 (three) times a day with meals   pregabalin (LYRICA) 50 mg capsule  Self No No   Sig: Take 1 caps. at bedtime   sitaGLIPtin (Januvia) 100 mg tablet  Self No No   Sig: TAKE 1/2 TABLET BY MOUTH EVERY DAY   sodium chloride (OCEAN) 0.65 % nasal spray  Self No No   Si spray into each nostril every hour as needed for congestion   spironolactone (ALDACTONE) 25 mg tablet  Self No No   Sig: Take 1 tablet (25 mg total) by mouth 2 (two) times a day   tiotropium (Spiriva Respimat) 1.25 MCG/ACT AERS inhaler  Self No No   Sig: Inhale 2 puffs daily      Facility-Administered Medications: None     Allergies[2]    Objective   First Vitals:   Blood Pressure: (!) 179/72 (25 1728)  Pulse: 85 (25 1728)  Temperature: 97.6 °F (36.4 °C) (25 1728)  Respirations: 18 (25 1728)  SpO2: 94 % (25 1728)    Current Vitals:   Blood Pressure: (!) 179/72 (25 1728)  Pulse: 85 (25 1728)  Temperature: 97.6 °F (36.4 °C) (25 1728)  Respirations: 18 (25 1728)  SpO2: 94 % (25 1728)    No intake or output data in the 24 hours ending 25 1813    Invasive Devices       None                   Physical Exam  Vitals and nursing note reviewed.   Constitutional:       General: He is not in acute distress.     Appearance: Normal appearance. He is well-developed.   HENT:      Head: Normocephalic and atraumatic.      Right Ear: External ear normal.      Left Ear: External ear normal.      Nose: Nose normal.      Mouth/Throat:      Pharynx: No oropharyngeal exudate.     Eyes:      Conjunctiva/sclera: Conjunctivae normal.      Pupils: Pupils are equal, round, and reactive to light.       Cardiovascular:      Rate and Rhythm: Normal rate and regular rhythm.   Pulmonary:      Effort: Pulmonary effort is normal. No respiratory distress.   Abdominal:      General: Abdomen is flat. There is no distension.      Palpations: Abdomen is soft.  "    Musculoskeletal:         General: No deformity. Normal range of motion.        Arms:       Cervical back: Normal range of motion and neck supple.      Comments: Normal radial pulse right upper extremity     Skin:     General: Skin is warm and dry.      Capillary Refill: Capillary refill takes less than 2 seconds.     Neurological:      General: No focal deficit present.      Mental Status: He is alert and oriented to person, place, and time. Mental status is at baseline.      Coordination: Coordination normal.     Psychiatric:         Mood and Affect: Mood normal.         Behavior: Behavior normal.         Thought Content: Thought content normal.         Judgment: Judgment normal.           Medical Decision Makin.  Acute exacerbation of right upper extremity pain: No history of any trauma; suspect neuralgia or flare of osteoarthritis; doubt gout; plan multimodal pain control; multimodal treatment of arthritis.  Follow-up with PCP as an outpatient.    No results found for this or any previous visit (from the past 36 hours).  No orders to display         Portions of the record may have been created with voice recognition software. Occasional wrong word or \"sound a like\" substitutions may have occurred due to the inherent limitations of voice recognition software.  Read the chart carefully and recognize, using context, where substitutions have occurred.        Critical Care Time  Procedures           [1]   Social History  Tobacco Use   Smoking Status Former    Current packs/day: 1.00    Average packs/day: 1 pack/day for 5.0 years (5.0 ttl pk-yrs)    Types: Pipe, Cigars, Cigarettes    Start date:     Quit date: 2021    Passive exposure: Never   Smokeless Tobacco Former   Tobacco Comments    cigar once a day   [2]   Allergies  Allergen Reactions    Azithromycin Other (See Comments)     Shaky, uneasy feeling     Bactrim [Sulfamethoxazole-Trimethoprim] Other (See Comments)     shakiness     "

## 2025-05-19 NOTE — TELEPHONE ENCOUNTER
Call came in from call center, Patient experiencing pain in arm and shoulder. Patient states he is unable to move his arm at all. Made patient that he should go to the ER for further testing

## 2025-05-20 ENCOUNTER — TELEPHONE (OUTPATIENT)
Dept: CARDIOLOGY CLINIC | Facility: CLINIC | Age: 60
End: 2025-05-20

## 2025-05-20 NOTE — TELEPHONE ENCOUNTER
F/U call to patient who stated he was started on steroids due to ED visit yesterday & right arm pain, due to arthritis.  Stated his right arm is is painful & hard to lift. Can move it.  Today's Wt - 308 lb  Denied edema to ankles & feet or abdominal bloating.    Taking all cardiac medications as ordered.  Stated he will get a [employer] reading later today when his wife is home to help him.

## 2025-05-22 NOTE — ED PROVIDER NOTES
Time reflects when diagnosis was documented in both MDM as applicable and the Disposition within this note       Time User Action Codes Description Comment    5/19/2025  6:04 PM Edmundo Lee Add [M10.9] Gout     5/19/2025  6:22 PM Edmundo Lee Remove [M10.9] Gout     5/19/2025  6:22 PM Edmundo Lee Add [L40.50] Psoriatic arthritis (HCC)           ED Disposition       ED Disposition   Discharge    Condition   Stable    Date/Time   Mon May 19, 2025  6:04 PM    Comment   Mesfin MONE Cano discharge to home/self care.                   Assessment & Plan       Medical Decision Making  Patient is well-appearing on exam GCS 15, AO x 4.  Patient has history of psoriasis.  No history of gout.  Would be odd for gout to involve this many joints.  Not appearing as a gouty joint as no redness or swelling.  Will treat as psoriatic arthritis.  Will not give NSAIDs due to his marginal kidney function.  Steroids, close PCP follow-up and strict return cautions given he is agreeable to this.    History and physical exam most consistent with: Psoriatic arthritis    Differential also includes but is not limited to: Gout, pseudogout    Considered septic arthritis however not consistent with history and physical exam    Based on patient's clinical history and physical exam there are no red flag signs or symptoms     Patient denies any additional symptoms on direct questioning except those explicitly noted in the HPI and ROS.     Triage note was reviewed and patient asked directly about concerns mentioned in triage note.     I will order appropriate testing to narrow my differential    Unless otherwise noted:  - There is no language barrier  - Chart was reviewed   - Labs and imaging were reviewed    Risk  OTC drugs.  Prescription drug management.             Medications   ketorolac (TORADOL) injection 15 mg (15 mg Intramuscular Given 5/19/25 1838)   predniSONE tablet 80 mg (80 mg Oral Given 5/19/25 1838)   acetaminophen (TYLENOL) tablet 975  "mg (975 mg Oral Given 5/19/25 1838)       ED Risk Strat Scores                    No data recorded                            History of Present Illness       Chief Complaint   Patient presents with    Arm Pain     Pt c/o arm pain and difficulty moving arm since this morning. Has had a \"bump\" on arm that they believe might be gout.        Past Medical History[1]   Past Surgical History[2]   Family History[3]   Social History[4]   E-Cigarette/Vaping    E-Cigarette Use Never User       E-Cigarette/Vaping Substances    Nicotine No     THC No     CBD No     Flavoring No     Other No     Unknown No       I have reviewed and agree with the history as documented.     Patient is 52-year-old male with extensive past medical history including CKD and CHF presents emergency department with right upper extremity pain for the past 3 days.  He says been progressively having worsening pain.  Started in his hand and then wrist elbow and shoulder.  Got worse overnight.  Noticed a nodule on the DIP on his index finger that he has not had before.  No fevers or chills.  No joint redness or swelling.  This is never happened to him before.  No trauma or new routines involving arm        Review of Systems   Constitutional:  Negative for chills, fatigue and fever.   Respiratory:  Negative for cough and shortness of breath.    Cardiovascular:  Negative for chest pain.   Gastrointestinal:  Negative for abdominal pain.   Neurological:  Negative for seizures and syncope.   All other systems reviewed and are negative.          Objective       ED Triage Vitals [05/19/25 1728]   Temperature Pulse Blood Pressure Respirations SpO2 Patient Position - Orthostatic VS   97.6 °F (36.4 °C) 85 (!) 179/72 18 94 % --      Temp src Heart Rate Source BP Location FiO2 (%) Pain Score    -- -- -- -- --      Vitals      Date and Time Temp Pulse SpO2 Resp BP Pain Score FACES Pain Rating User   05/19/25 1728 97.6 °F (36.4 °C) 85 94 % 18 179/72 -- -- KIY      "       Physical Exam  Constitutional:       General: He is not in acute distress.     Appearance: Normal appearance. He is normal weight. He is not ill-appearing, toxic-appearing or diaphoretic.   HENT:      Head: Normocephalic and atraumatic.      Nose: Nose normal.      Mouth/Throat:      Mouth: Mucous membranes are moist.      Pharynx: Oropharynx is clear. No oropharyngeal exudate or posterior oropharyngeal erythema.     Eyes:      General: No scleral icterus.        Right eye: No discharge.         Left eye: No discharge.      Extraocular Movements: Extraocular movements intact.      Conjunctiva/sclera: Conjunctivae normal.      Pupils: Pupils are equal, round, and reactive to light.       Cardiovascular:      Rate and Rhythm: Normal rate and regular rhythm.      Pulses: Normal pulses.      Heart sounds: Normal heart sounds. No murmur heard.     No friction rub. No gallop.   Pulmonary:      Effort: Pulmonary effort is normal. No respiratory distress.      Breath sounds: Normal breath sounds. No stridor. No wheezing, rhonchi or rales.   Chest:      Chest wall: No tenderness.   Abdominal:      General: Abdomen is flat. Bowel sounds are normal. There is no distension.      Palpations: Abdomen is soft. There is no mass.      Tenderness: There is no abdominal tenderness. There is no guarding or rebound.      Hernia: No hernia is present.     Musculoskeletal:      Cervical back: Normal range of motion and neck supple. No rigidity.      Right lower leg: Edema present.      Left lower leg: Edema present.      Comments: Shoulder, elbow, wrist and fingers without redness, erythema or swelling.  He is tender to palpation of all the joints.  Hesitant to let me move any of these joints.  Neurovascularly intact.  Sensation equal bilaterally   Lymphadenopathy:      Cervical: No cervical adenopathy.     Skin:     General: Skin is warm and dry.      Capillary Refill: Capillary refill takes less than 2 seconds.     Neurological:       General: No focal deficit present.      Mental Status: He is alert and oriented to person, place, and time.         Results Reviewed       None            No orders to display       Procedures    ED Medication and Procedure Management   Prior to Admission Medications   Prescriptions Last Dose Informant Patient Reported? Taking?   Accu-Chek FastClix Lancets MISC  Self No No   Sig: Test blood sugar twice daily   Blood Glucose Monitoring Suppl (Accu-Chek Guide) w/Device KIT  Self No No   Sig: Use 2 (two) times a day Test blood sugar twice daily   Empagliflozin (JARDIANCE) 10 MG TABS tablet  Self No No   Sig: Take 1 tablet (10 mg total) by mouth daily   acetaminophen (TYLENOL) 325 mg tablet  Self No No   Sig: Take 2 tablets (650 mg total) by mouth every 6 (six) hours as needed for mild pain, headaches or fever   albuterol (PROVENTIL HFA,VENTOLIN HFA) 90 mcg/act inhaler  Self No No   Sig: Inhale 2 puffs every 4 (four) hours as needed for wheezing   apixaban (Eliquis) 5 mg  Self No No   Sig: Take 1 tablet (5 mg total) by mouth 2 (two) times a day   atorvastatin (LIPITOR) 10 mg tablet  Self No No   Sig: TAKE 1 TABLET BY MOUTH EVERY DAY   budesonide-formoterol (Symbicort) 160-4.5 mcg/act inhaler  Self No No   Sig: Inhale 2 puffs 2 (two) times a day Rinse mouth after use.   bumetanide (BUMEX) 2 mg tablet  Self No No   Sig: TAKE 3 TABLETS (6 MG TOTAL) BY MOUTH 3 (THREE) TIMES A DAY   cyanocobalamin (VITAMIN B-12) 1000 MCG tablet  Self No No   Sig: Take 1 tablet (1,000 mcg total) by mouth daily   famotidine (PEPCID) 20 mg tablet  Self No No   Sig: Take 1 tablet (20 mg total) by mouth if needed for heartburn As needed twice a day   fluticasone (FLONASE) 50 mcg/act nasal spray  Self No No   Si spray into each nostril 2 (two) times a day   loratadine (CLARITIN) 10 mg tablet  Self No No   Sig: Take 1 tablet (10 mg total) by mouth if needed for allergies As needed daily   magnesium Oxide (MAG-OX) 400 mg TABS  Self No No    Sig: Take 1 tablet (400 mg total) by mouth 2 (two) times a day   metolazone (ZAROXOLYN) 2.5 mg tablet  Self No No   Sig: Take 2 tablets (5 mg total) by mouth 3 (three) times a week On  and Saturday with potassium 40 mill equivalents Take 20-30 minutes before Bumex dose and with additional potassium   metoprolol succinate (TOPROL-XL) 50 mg 24 hr tablet  Self No No   Sig: TAKE 1 TABLET BY MOUTH EVERY DAY   montelukast (SINGULAIR) 10 mg tablet  Self No No   Sig: Take 1 tablet (10 mg total) by mouth daily at bedtime   nitroglycerin (NITROSTAT) 0.4 mg SL tablet  Self No No   Sig: Place 1 tablet (0.4 mg total) under the tongue every 5 (five) minutes as needed for chest pain for up to 50 doses   potassium chloride (Klor-Con M20) 20 mEq tablet  Self No No   Sig: Take 2 tablets (40 mEq total) by mouth 3 (three) times a day with meals   pregabalin (LYRICA) 50 mg capsule  Self No No   Sig: Take 1 caps. at bedtime   sitaGLIPtin (Januvia) 100 mg tablet  Self No No   Sig: TAKE 1/2 TABLET BY MOUTH EVERY DAY   sodium chloride (OCEAN) 0.65 % nasal spray  Self No No   Si spray into each nostril every hour as needed for congestion   spironolactone (ALDACTONE) 25 mg tablet  Self No No   Sig: Take 1 tablet (25 mg total) by mouth 2 (two) times a day   tiotropium (Spiriva Respimat) 1.25 MCG/ACT AERS inhaler  Self No No   Sig: Inhale 2 puffs daily      Facility-Administered Medications: None     Discharge Medication List as of 2025  6:24 PM        START taking these medications    Details   !! acetaminophen (TYLENOL) 500 mg tablet Take 2 tablets (1,000 mg total) by mouth every 8 (eight) hours for 7 days, Starting 2025, Until 2025, Normal      Diclofenac Sodium (VOLTAREN) 1 % Apply 2 g topically 4 (four) times a day for 7 days, Starting 2025, Until 2025, Normal      predniSONE 20 mg tablet Multiple Dosages:Starting 2025, Until 2025 at 2359, THEN Starting u  5/22/2025, Until Sat 5/24/2025 at 2359, THEN Starting Sun 5/25/2025, Until Tue 5/27/2025 at 2359Take 2 tablets (40 mg total) by mouth daily for 3 days, THEN 1 tab let (20 mg total) daily for 3 days, THEN 0.5 tablets (10 mg total) daily for 3 days., Normal       !! - Potential duplicate medications found. Please discuss with provider.        CONTINUE these medications which have NOT CHANGED    Details   Accu-Chek FastClix Lancets MISC Test blood sugar twice daily, Normal      !! acetaminophen (TYLENOL) 325 mg tablet Take 2 tablets (650 mg total) by mouth every 6 (six) hours as needed for mild pain, headaches or fever, Starting Mon 2/12/2024, OTC      albuterol (PROVENTIL HFA,VENTOLIN HFA) 90 mcg/act inhaler Inhale 2 puffs every 4 (four) hours as needed for wheezing, Starting Wed 1/15/2025, Normal      apixaban (Eliquis) 5 mg Take 1 tablet (5 mg total) by mouth 2 (two) times a day, Starting Tue 4/22/2025, Normal      atorvastatin (LIPITOR) 10 mg tablet TAKE 1 TABLET BY MOUTH EVERY DAY, Starting Tue 2/11/2025, Normal      Blood Glucose Monitoring Suppl (Accu-Chek Guide) w/Device KIT Use 2 (two) times a day Test blood sugar twice daily, Starting Thu 8/5/2021, Normal      budesonide-formoterol (Symbicort) 160-4.5 mcg/act inhaler Inhale 2 puffs 2 (two) times a day Rinse mouth after use., Starting Mon 3/10/2025, Normal      bumetanide (BUMEX) 2 mg tablet TAKE 3 TABLETS (6 MG TOTAL) BY MOUTH 3 (THREE) TIMES A DAY, Starting Mon 2/17/2025, Normal      cyanocobalamin (VITAMIN B-12) 1000 MCG tablet Take 1 tablet (1,000 mcg total) by mouth daily, Starting Thu 10/10/2024, Normal      Empagliflozin (JARDIANCE) 10 MG TABS tablet Take 1 tablet (10 mg total) by mouth daily, Starting Wed 4/9/2025, Normal      famotidine (PEPCID) 20 mg tablet Take 1 tablet (20 mg total) by mouth if needed for heartburn As needed twice a day, Starting Thu 10/10/2024, No Print      fluticasone (FLONASE) 50 mcg/act nasal spray 1 spray into each nostril 2  (two) times a day, Starting Thu 10/12/2023, OTC      loratadine (CLARITIN) 10 mg tablet Take 1 tablet (10 mg total) by mouth if needed for allergies As needed daily, Starting Thu 10/10/2024, No Print      magnesium Oxide (MAG-OX) 400 mg TABS Take 1 tablet (400 mg total) by mouth 2 (two) times a day, Starting u 3/20/2025, Normal      metolazone (ZAROXOLYN) 2.5 mg tablet Take 2 tablets (5 mg total) by mouth 3 (three) times a week On Tuesday Thursday and Saturday with potassium 40 mill equivalents Take 20-30 minutes before Bumex dose and with additional potassium, Starting Mon 4/28/2025, Normal      metoprolol succinate (TOPROL-XL) 50 mg 24 hr tablet TAKE 1 TABLET BY MOUTH EVERY DAY, Starting Fri 12/13/2024, Normal      montelukast (SINGULAIR) 10 mg tablet Take 1 tablet (10 mg total) by mouth daily at bedtime, Starting Thu 2/3/2022, Until Wed 5/14/2025, Normal      nitroglycerin (NITROSTAT) 0.4 mg SL tablet Place 1 tablet (0.4 mg total) under the tongue every 5 (five) minutes as needed for chest pain for up to 50 doses, Starting Thu 6/27/2024, Normal      potassium chloride (Klor-Con M20) 20 mEq tablet Take 2 tablets (40 mEq total) by mouth 3 (three) times a day with meals, Starting Thu 3/20/2025, No Print      pregabalin (LYRICA) 50 mg capsule Take 1 caps. at bedtime, Normal      sitaGLIPtin (Januvia) 100 mg tablet TAKE 1/2 TABLET BY MOUTH EVERY DAY, Normal      sodium chloride (OCEAN) 0.65 % nasal spray 1 spray into each nostril every hour as needed for congestion, Starting Fri 11/29/2024, Normal      spironolactone (ALDACTONE) 25 mg tablet Take 1 tablet (25 mg total) by mouth 2 (two) times a day, Starting Sun 4/27/2025, Until Tue 5/27/2025, Normal      tiotropium (Spiriva Respimat) 1.25 MCG/ACT AERS inhaler Inhale 2 puffs daily, Starting Thu 1/16/2025, Until Wed 5/14/2025, Normal       !! - Potential duplicate medications found. Please discuss with provider.        No discharge procedures on file.  ED SEPSIS  DOCUMENTATION   Time reflects when diagnosis was documented in both MDM as applicable and the Disposition within this note       Time User Action Codes Description Comment    5/19/2025  6:04 PM Edmundo Lee Add [M10.9] Gout     5/19/2025  6:22 PM Edmundo Lee Remove [M10.9] Gout     5/19/2025  6:22 PM Edmundo Lee Add [L40.50] Psoriatic arthritis (HCC)                    [1]   Past Medical History:  Diagnosis Date    Abnormal stress test 06/26/2024    Acute gastritis without hemorrhage     last assessed 3/24/17    Acute on chronic diastolic heart failure (HCC) 01/04/2025    Asthma     Atrial fibrillation (HCC)     Bilateral leg edema 02/19/2020    CHF (congestive heart failure) (HCC)     CHF, acute on chronic (HCC) 03/12/2025    Coronary artery disease     Daytime sleepiness 07/17/2019    Diabetes mellitus (HCC)     Dyspnea on exertion 10/11/2021    Edema of both feet 01/23/2020    Electrolyte abnormality 10/05/2024    Gastric bypass status for obesity     HNP (herniated nucleus pulposus), lumbar 02/23/2021    Hyperlipidemia     Hypertension     Hypokalemia 11/14/2021    Impaired fasting glucose     last assessed 5/10/17    Knee sprain, bilateral 06/14/2018    Leg cramps 06/16/2022    Obesity     Status post cholecystectomy 02/17/2024    Uncontrolled atrial flutter (HCC) 06/18/2024    Viral gastroenteritis     last assessed 11/4/16   [2]   Past Surgical History:  Procedure Laterality Date    CARDIAC CATHETERIZATION N/A 3/9/2022    Procedure: CARDIAC RHC W/ PRESSURE SENSOR;  Surgeon: Aminata Wilcox MD;  Location: [facility];  Service: Cardiology    CARDIAC CATHETERIZATION N/A 9/6/2022    Procedure: Cardiac catheterization;  Surgeon: Yolanda Del Rosario DO;  Location: [facility];  Service: Cardiology    CARDIAC CATHETERIZATION N/A 9/6/2022    Procedure: Cardiac Coronary Angiogram;  Surgeon: Yolanda Del Rosario DO;  Location: [facility];  Service: Cardiology    CARDIAC CATHETERIZATION  N/A 10/11/2023    Procedure: Cardiac RHC;  Surgeon: Yoel Darden MD;  Location: [facility];  Service: Cardiology    CARPAL TUNNEL RELEASE      bilateral    CHOLECYSTECTOMY LAPAROSCOPIC N/A 2/7/2024    Procedure: CHOLECYSTECTOMY LAPAROSCOPIC;  Surgeon: Nena Ferguson MD;  Location: [facility];  Service: General    GASTRIC BYPASS  2004    with han en y    HERNIA REPAIR      ventral inscisional    IR BIOPSY BONE MARROW  12/14/2023    LAPAROSCOPIC TRANSGASTRIC ACCESS FOR ERCP N/A 2/7/2024    Procedure: LAPAROSCOPIC TRANSGASTRIC ACCESS FOR ERCP;  Surgeon: Nena Ferguson MD;  Location: [facility];  Service: General    LAPAROSCOPIC TRANSGASTRIC ACCESS FOR ERCP N/A 2/7/2024    Procedure: ERCP, sphincterotomy, stone extraction;  Surgeon: Rey Ferguson MD;  Location: [facility];  Service: Gastroenterology    TONSILLECTOMY     [3]   Family History  Problem Relation Name Age of Onset    Stroke Mother Dad     Hypertension Father Dad     Cancer Father Dad     COPD Father Dad    [4]   Social History  Tobacco Use    Smoking status: Former     Current packs/day: 1.00     Average packs/day: 1 pack/day for 5.0 years (5.0 ttl pk-yrs)     Types: Pipe, Cigars, Cigarettes     Start date: 2016     Quit date: 1/1/2021     Passive exposure: Never    Smokeless tobacco: Former    Tobacco comments:     cigar once a day   Vaping Use    Vaping status: Never Used   Substance Use Topics    Alcohol use: Yes     Alcohol/week: 2.0 standard drinks of alcohol     Types: 2 Shots of liquor per week     Comment: social    Drug use: No        Edmundo Lee MD  05/22/25 5325

## 2025-05-29 ENCOUNTER — TELEPHONE (OUTPATIENT)
Dept: CARDIOLOGY CLINIC | Facility: CLINIC | Age: 60
End: 2025-05-29

## 2025-05-29 NOTE — TELEPHONE ENCOUNTER
Patient returned call. Stated he is doing really well. Denied any edema to extremities. Stated breathing is @ baseline SOB.  Today's Wt - 307 lb  Today's Cardio[facility] Reading - 14  5/28 - 14 5/27 - 12

## 2025-05-29 NOTE — TELEPHONE ENCOUNTER
F/u call to patient. Left detailed message with c/b number & request for patient to return call to office.

## 2025-06-02 ENCOUNTER — TELEPHONE (OUTPATIENT)
Dept: CARDIOLOGY CLINIC | Facility: CLINIC | Age: 60
End: 2025-06-02

## 2025-06-02 DIAGNOSIS — I50.9 ACUTE ON CHRONIC CONGESTIVE HEART FAILURE, UNSPECIFIED HEART FAILURE TYPE (HCC): ICD-10-CM

## 2025-06-02 NOTE — TELEPHONE ENCOUNTER
Patient called to state he needs Potassium 20 mEq, take 2 tablets (40 mEq total) by mouth 3 (three) times a day with meals.    Stated he is feeling well. No edema. No abdominal distention or bloating. Baseline SOB.  Today's Wt - 306 lb

## 2025-06-03 RX ORDER — POTASSIUM CHLORIDE 1500 MG/1
40 TABLET, EXTENDED RELEASE ORAL
Qty: 180 TABLET | Refills: 6 | Status: SHIPPED | OUTPATIENT
Start: 2025-06-03

## 2025-06-06 ENCOUNTER — CLINICAL SUPPORT (OUTPATIENT)
Dept: CARDIOLOGY CLINIC | Facility: CLINIC | Age: 60
End: 2025-06-06
Payer: COMMERCIAL

## 2025-06-06 DIAGNOSIS — I50.32 CHRONIC HEART FAILURE WITH PRESERVED EJECTION FRACTION (HFPEF, >= 50%) (HCC): Primary | ICD-10-CM

## 2025-06-06 PROCEDURE — 93264 REM MNTR WRLS P-ART PRS SNR: CPT | Performed by: INTERNAL MEDICINE

## 2025-06-11 NOTE — PROGRESS NOTES
Advanced Heart Failure / Pulmonary Hypertension Outpatient Visit    Mesfin Cano 60 y.o. male   MRN: [id number]  Encounter: [id number]    Assessment:  Patient Active Problem List    Diagnosis Date Noted    Pancreatic cyst 04/14/2025    Liver lesion, left lobe 01/08/2025    Serum total bilirubin elevated 01/05/2025    Iron deficiency 10/07/2024    Onychomycosis 10/05/2024    Moderate persistent asthma without complication 06/18/2024    Type 2 diabetes mellitus without complication, without long-term current use of insulin (HCC) 12/23/2023    Anemia due to chronic kidney disease 10/16/2023    Chronic heart failure with preserved ejection fraction (HFpEF, >= 50%) (HCC) 04/10/2023    Diabetic polyneuropathy associated with type 2 diabetes mellitus (HCC) 12/20/2022    Stage 3 chronic kidney disease (HCC) 09/16/2022    Presence of CardioMEMS HF system 03/09/2022    Paroxysmal atrial fibrillation (HCC) 03/01/2022    Foraminal stenosis of lumbar region 02/23/2021    VICKY (obstructive sleep apnea)     Hyperlipidemia 04/18/2019    Primary osteoarthritis of both knees 06/14/2018    Irritable bowel syndrome with diarrhea 01/30/2018    Primary hypertension 01/30/2018    Vitamin B12 deficiency 03/08/2016    Vitamin D deficiency 03/08/2016    Morbid obesity (HCC) 02/23/2016       Today's Plan:  Continue Bumex 6 TID + metolazone 5mg  three x weekly--volume status stable, weights stable.    Con't Jardiance and Spironolactone. BP's stable 120's  Repeat BMP prior to next appt.    RTO in July 3 rd w/Dr. Wilcox    Plan:  Acute on chronic HFpEF; LVEF 55%              Impression: unclear etiology of current exacerbation. ?increased Afib burden.  RHC / MEMS calibration 10/11/2023: CardioMEMS data correlates to RHC.      Weight: on  lbs---> 306 lbs-->310 lbs today  Last BMP: 5/12/25: , K 3.4, creat 1.9  Volume status: Stable, weights within BL  BP today: 120/80, HR 86     Guideline Directed Medical Therapy:  --Aldosterone  "Antagonist: spironolactone 25 mg daily.   --SGLT2 Inhibitor: empagliflozin 10 mg daily.      Volume Management:  --Home Diuretic: Bumex 6 mg TID with metolazone 5 mg TTSa.   --Inpatient Diuretic: IV Bumex 1 mg/hr. --Bumex 6 TiD + PRN Metolazone (last: 4/26)  --K supplementation: potassium 40 mEq TID.                  Advanced Therapies:              --CardioMEMS: implanted 03/09/2022. Goal PAd of 12 mmHg.     Atrial fibrillation, parosxysmal              [id number] = 3 (HF, HTN, DM).              Anticoagulation on Eliquis.               Rate control: metoprolol succinate 50 mg daily.              Rhythm control: No.     Chronic kidney disease, stage IIIa (1.5-2.0)              On DC: 2.16--> 1.90 on 5/12     Hypertension  Hyperlipidemia  Asthma, severe persistent: follows with Dr. Noonan as outpatient.   Obstructive sleep apnea  Diabetes mellitus, type II  History of gastric bypass (Samuel-en-Y) surgery   History of tobacco abuse           HPI:   Mesfin Cano is a 59-year-old man with a PMH as above who presents to the office for follow-up. Follows with Dr. Wilcox.      03/26/2025 with TH: \"presented to the hospital in early January with worsening SOB and weight gain of 7 pounds. Admitted for decompensated heart failure. Had been getting additional doses of metolazone as an outpatient due to fluid retention and elevated PAD readings on CardioMEMS. The readmitted on 3/11, again with volume overload. Diuresed with Bumex gtt and transitioned back to oral Bumex 6 mg TID, Spironolactone, Jardiance and Metolazone 5 mg three times weekly.      presents today for hospital follow-up. Reports gaining several pounds since discharge. CardioMEMS reading from yesterday with PAD of 16. States he feels completely fine and at his baseline. He reports no dietary indiscretions.\"     Advised to take an additional metolazone x1 on 03/26. Patient misunderstood instructions, and began taking metolazone 5 mg daily. Patient was on daily " metolazone dosing for ~2-3 days before this was remedied, and dosing changed back to 3x weekly.      04/09/2025: Patient presents for follow-up. Down 6 lbs since last office visit. Confirmed patient is now back on metolazone schedule of 5 mg 3x weekly. Denies lightheadedness, dizziness, chest pain, palpitations, SOB at rest, TELLES, worsening LE swelling, PND, and orthopnea.     4/19/25 Hospital Admit: Mesfin Cano is a 59 y.o. male patient who originally presented to the hospital on 4/19/2025 due to weight gain.  Patient was diagnosed with acute on chronic diastolic congestive heart failure he was placed on IV Bumex gtt. on admission. He was closely monitored and managed by cardiology.  Today he is clinically and symptomatically improved and will be discharged on Bumex 6 mg 3 times daily, metolazone 5 mg 3 times weekly with potassium, metoprolol XL 50 mg daily. Aldactone increased to 25 mg twice daily     5/2/25: 1 week f/u: Pt states he is feeling well overall.  Weights down at home to 306 lbs.  Reports TELLES is at baseline, improved since hospitalization.  Took metolazone Tues and Thurs this week with diminished UO.  Encouraged to increase fluid intake for the next couple days as he may be slightly dry.  Pt also reports new dizziness this past week.  He is obtaining blood work today--will call with abnormal results.      6/12/25: Pt is feeling well from a cardiac standpoint.  Recovering from recent stomach virus.  Weights at home stable 307 lbs.  Still taking Metolazone tues- thurs-sat.  Denies increased SOB, leg edema, abdominal distension.  Had Cardiomems reading this morning--not avail to read.  Tries to walk in the morning --can tolerate 1-1.5 blocks.       Past Medical History[1]    Review of Systems   Constitutional:  Negative for unexpected weight change.   HENT: Negative.     Eyes: Negative.    Respiratory:  Positive for shortness of breath.         SOB at baseline   Cardiovascular:  Positive for leg  swelling. Negative for chest pain and palpitations.        Le edema at baseline   Gastrointestinal:  Negative for abdominal distention.   Endocrine: Negative.    Genitourinary: Negative.    Musculoskeletal: Negative.    Skin: Negative.    Allergic/Immunologic: Negative.    Neurological: Negative.    Hematological: Negative.    Psychiatric/Behavioral: Negative.         Allergies[2]    Current Medications[3]    Social History     Socioeconomic History    Marital status: /Civil Union     Spouse name: Not on file    Number of children: Not on file    Years of education: Not on file    Highest education level: Not on file   Occupational History    Not on file   Tobacco Use    Smoking status: Former     Current packs/day: 1.00     Average packs/day: 1 pack/day for 5.0 years (5.0 ttl pk-yrs)     Types: Pipe, Cigars, Cigarettes     Start date: 2016     Quit date: 1/1/2021     Passive exposure: Never    Smokeless tobacco: Former    Tobacco comments:     cigar once a day   Vaping Use    Vaping status: Never Used   Substance and Sexual Activity    Alcohol use: Yes     Alcohol/week: 2.0 standard drinks of alcohol     Types: 2 Shots of liquor per week     Comment: social    Drug use: No    Sexual activity: Yes     Partners: Female     Birth control/protection: None   Other Topics Concern    Not on file   Social History Narrative    Not on file     Social Drivers of Health     Financial Resource Strain: Not on file   Food Insecurity: No Food Insecurity (4/19/2025)    Nursing - Inadequate Food Risk Classification     Worried About Running Out of Food in the Last Year: Sometimes true     Ran Out of Food in the Last Year: Never true     Ran Out of Food in the Last Year: Never true   Transportation Needs: No Transportation Needs (4/19/2025)    Nursing - Transportation Risk Classification     Lack of Transportation: Not on file     Lack of Transportation: No   Physical Activity: Not on file   Stress: Not on file   Social  "Connections: Not on file   Intimate Partner Violence: Unknown (2025)    Nursing IPS     Feels Physically and Emotionally Safe: Not on file     Physically Hurt by Someone: Not on file     Humiliated or Emotionally Abused by Someone: Not on file     Physically Hurt by Someone: No     Hurt or Threatened by Someone: No   Housing Stability: Unknown (2025)    Nursing: Inadequate Housing Risk Classification     Has Housing: Not on file     Worried About Losing Housing: Not on file     Unable to Get Utilities: Not on file     Unable to Pay for Housing in the Last Year: No     Has Housin     Family History[4]    Vitals:   Blood pressure 120/80, pulse 86, height 6' 1\" (1.854 m), weight (!) 141 kg (310 lb), SpO2 96%.    Wt Readings from Last 10 Encounters:   25 (!) 141 kg (310 lb)   25 (!) 140 kg (308 lb)   25 (!) 139 kg (306 lb 8 oz)   25 (!) 140 kg (308 lb 9.6 oz)   25 (!) 145 kg (319 lb 3.2 oz)   25 (!) 145 kg (319 lb 6.4 oz)   25 (!) 147 kg (325 lb)   25 (!) 141 kg (309 lb 15.5 oz)   03/10/25 (!) 151 kg (333 lb 3.2 oz)   25 (!) 143 kg (315 lb)     Vitals:    25 0940   BP: 120/80   BP Location: Left arm   Patient Position: Sitting   Cuff Size: Standard   Pulse: 86   SpO2: 96%   Weight: (!) 141 kg (310 lb)   Height: 6' 1\" (1.854 m)       Physical Exam  Constitutional:       Appearance: Normal appearance.   Neck:      Vascular: No JVD.     Cardiovascular:      Rate and Rhythm: Normal rate and regular rhythm.      Heart sounds: S1 normal and S2 normal.   Pulmonary:      Effort: No respiratory distress.      Breath sounds: Normal breath sounds and air entry.     Musculoskeletal:      Right lower le+ Edema present.      Left lower le+ Edema present.     Skin:     General: Skin is warm and dry.     Neurological:      Mental Status: He is alert and oriented to person, place, and time. Mental status is at baseline.     Psychiatric:         Behavior: " Behavior is cooperative.         Cognition and Memory: Cognition normal.       Labs & Results:  Lab Results   Component Value Date    WBC 6.57 04/21/2025    HGB 11.9 (L) 04/21/2025    HCT 37.3 04/21/2025    MCV 95 04/21/2025     04/21/2025     Lab Results   Component Value Date    SODIUM 134 (L) 05/12/2025    K 3.4 (L) 05/12/2025    CL 87 (L) 05/12/2025    CO2 30 05/12/2025    BUN 41 (H) 05/12/2025    CREATININE 1.90 (H) 05/12/2025    GLUC 209 (H) 04/27/2025    CALCIUM 8.5 05/12/2025     Lab Results   Component Value Date    INR 0.96 01/05/2025    INR 1.29 (H) 10/12/2023    PROTIME 13.1 01/05/2025    PROTIME 15.9 (H) 10/12/2023     Lab Results   Component Value Date     (H) 04/19/2025        RODRIGUE Sahu         [1]   Past Medical History:  Diagnosis Date    Abnormal stress test 06/26/2024    Acute gastritis without hemorrhage     last assessed 3/24/17    Acute on chronic diastolic heart failure (HCC) 01/04/2025    Asthma     Atrial fibrillation (HCC)     Bilateral leg edema 02/19/2020    CHF (congestive heart failure) (HCC)     CHF, acute on chronic (HCC) 03/12/2025    Coronary artery disease     Daytime sleepiness 07/17/2019    Diabetes mellitus (HCC)     Dyspnea on exertion 10/11/2021    Edema of both feet 01/23/2020    Electrolyte abnormality 10/05/2024    Gastric bypass status for obesity     HNP (herniated nucleus pulposus), lumbar 02/23/2021    Hyperlipidemia     Hypertension     Hypokalemia 11/14/2021    Impaired fasting glucose     last assessed 5/10/17    Knee sprain, bilateral 06/14/2018    Leg cramps 06/16/2022    Obesity     Status post cholecystectomy 02/17/2024    Uncontrolled atrial flutter (HCC) 06/18/2024    Viral gastroenteritis     last assessed 11/4/16   [2]   Allergies  Allergen Reactions    Azithromycin Other (See Comments)     Shaky, uneasy feeling     Bactrim [Sulfamethoxazole-Trimethoprim] Other (See Comments)     shakiness   [3]   Current Outpatient Medications:      Accu-Chek FastClix Lancets MISC, Test blood sugar twice daily, Disp: 200 each, Rfl: 3    acetaminophen (TYLENOL) 325 mg tablet, Take 2 tablets (650 mg total) by mouth every 6 (six) hours as needed for mild pain, headaches or fever, Disp: , Rfl:     albuterol (PROVENTIL HFA,VENTOLIN HFA) 90 mcg/act inhaler, Inhale 2 puffs every 4 (four) hours as needed for wheezing, Disp: 18 g, Rfl: 0    apixaban (Eliquis) 5 mg, Take 1 tablet (5 mg total) by mouth 2 (two) times a day, Disp: 180 tablet, Rfl: 2    atorvastatin (LIPITOR) 10 mg tablet, TAKE 1 TABLET BY MOUTH EVERY DAY, Disp: 30 tablet, Rfl: 5    Blood Glucose Monitoring Suppl (Accu-Chek Guide) w/Device KIT, Use 2 (two) times a day Test blood sugar twice daily, Disp: 1 kit, Rfl: 0    budesonide-formoterol (Symbicort) 160-4.5 mcg/act inhaler, Inhale 2 puffs 2 (two) times a day Rinse mouth after use., Disp: 30.6 g, Rfl: 2    bumetanide (BUMEX) 2 mg tablet, TAKE 3 TABLETS (6 MG TOTAL) BY MOUTH 3 (THREE) TIMES A DAY, Disp: 810 tablet, Rfl: 1    cyanocobalamin (VITAMIN B-12) 1000 MCG tablet, Take 1 tablet (1,000 mcg total) by mouth daily, Disp: 30 tablet, Rfl: 0    Empagliflozin (JARDIANCE) 10 MG TABS tablet, Take 1 tablet (10 mg total) by mouth daily, Disp: 30 tablet, Rfl: 11    famotidine (PEPCID) 20 mg tablet, Take 1 tablet (20 mg total) by mouth if needed for heartburn As needed twice a day, Disp: , Rfl:     fluticasone (FLONASE) 50 mcg/act nasal spray, 1 spray into each nostril 2 (two) times a day, Disp: , Rfl: 0    loratadine (CLARITIN) 10 mg tablet, Take 1 tablet (10 mg total) by mouth if needed for allergies As needed daily, Disp: , Rfl:     magnesium Oxide (MAG-OX) 400 mg TABS, Take 1 tablet (400 mg total) by mouth 2 (two) times a day, Disp: 60 tablet, Rfl: 0    metolazone (ZAROXOLYN) 2.5 mg tablet, Take 2 tablets (5 mg total) by mouth 3 (three) times a week On Tuesday Thursday and Saturday with potassium 40 mill equivalents Take 20-30 minutes before Bumex dose and  with additional potassium, Disp: 180 tablet, Rfl: 0    metoprolol succinate (TOPROL-XL) 50 mg 24 hr tablet, TAKE 1 TABLET BY MOUTH EVERY DAY, Disp: 30 tablet, Rfl: 5    potassium chloride (Klor-Con M20) 20 mEq tablet, Take 2 tablets (40 mEq total) by mouth 3 (three) times a day with meals, Disp: 180 tablet, Rfl: 6    pregabalin (LYRICA) 50 mg capsule, Take 1 caps. at bedtime, Disp: 90 capsule, Rfl: 1    sitaGLIPtin (Januvia) 100 mg tablet, TAKE 1/2 TABLET BY MOUTH EVERY DAY, Disp: 15 tablet, Rfl: 5    sodium chloride (OCEAN) 0.65 % nasal spray, 1 spray into each nostril every hour as needed for congestion, Disp: 37.5 mL, Rfl: 0    spironolactone (ALDACTONE) 25 mg tablet, Take 1 tablet (25 mg total) by mouth 2 (two) times a day, Disp: 90 tablet, Rfl: 3    tiotropium (Spiriva Respimat) 1.25 MCG/ACT AERS inhaler, Inhale 2 puffs daily, Disp: 4 g, Rfl: 5    Diclofenac Sodium (VOLTAREN) 1 %, Apply 2 g topically 4 (four) times a day for 7 days, Disp: 56 g, Rfl: 0    montelukast (SINGULAIR) 10 mg tablet, Take 1 tablet (10 mg total) by mouth daily at bedtime, Disp: 90 tablet, Rfl: 1    nitroglycerin (NITROSTAT) 0.4 mg SL tablet, Place 1 tablet (0.4 mg total) under the tongue every 5 (five) minutes as needed for chest pain for up to 50 doses, Disp: 50 tablet, Rfl: 0  [4]   Family History  Problem Relation Name Age of Onset    Stroke Mother Dad     Hypertension Father Dad     Cancer Father Dad     COPD Father Dad

## 2025-06-12 ENCOUNTER — OFFICE VISIT (OUTPATIENT)
Dept: CARDIOLOGY CLINIC | Facility: CLINIC | Age: 60
End: 2025-06-12
Payer: COMMERCIAL

## 2025-06-12 VITALS
SYSTOLIC BLOOD PRESSURE: 120 MMHG | HEART RATE: 86 BPM | DIASTOLIC BLOOD PRESSURE: 80 MMHG | WEIGHT: 310 LBS | OXYGEN SATURATION: 96 % | HEIGHT: 73 IN | BODY MASS INDEX: 41.08 KG/M2

## 2025-06-12 DIAGNOSIS — I50.32 CHRONIC HEART FAILURE WITH PRESERVED EJECTION FRACTION (HFPEF, >= 50%) (HCC): Primary | Chronic | ICD-10-CM

## 2025-06-12 DIAGNOSIS — I50.32 CHRONIC HEART FAILURE WITH PRESERVED EJECTION FRACTION (HCC): ICD-10-CM

## 2025-06-12 DIAGNOSIS — I50.9 CHF EXACERBATION (HCC): ICD-10-CM

## 2025-06-12 PROCEDURE — 99214 OFFICE O/P EST MOD 30 MIN: CPT

## 2025-06-12 RX ORDER — SPIRONOLACTONE 25 MG/1
25 TABLET ORAL 2 TIMES DAILY
Qty: 90 TABLET | Refills: 3 | Status: SHIPPED | OUTPATIENT
Start: 2025-06-12 | End: 2025-12-09

## 2025-06-12 RX ORDER — METOPROLOL SUCCINATE 50 MG/1
50 TABLET, EXTENDED RELEASE ORAL DAILY
Qty: 30 TABLET | Refills: 5 | Status: SHIPPED | OUTPATIENT
Start: 2025-06-12

## 2025-06-17 ENCOUNTER — TELEPHONE (OUTPATIENT)
Dept: CARDIOLOGY CLINIC | Facility: CLINIC | Age: 60
End: 2025-06-17

## 2025-06-17 NOTE — TELEPHONE ENCOUNTER
F/U call to patient who stated he is feeling good. Denied edema to lower extremities or abdominal bloating.  Stated he is @ baseline level of SOB.  Today's Wt - 308 lb  Taking all cardiac medications as ordered.    Stated he will get a CarioMems reading today. Patient's last CarioMems reading was   6/15 - 13 6/14 - 15   6/13 - None  6/12 - 13 6/11 - 13  6/10 - 13

## 2025-06-20 ENCOUNTER — TELEPHONE (OUTPATIENT)
Dept: CARDIOLOGY CLINIC | Facility: CLINIC | Age: 60
End: 2025-06-20

## 2025-06-20 NOTE — TELEPHONE ENCOUNTER
Called patient & left a message with c/b number & request for patient to return call to office regarding CardioMems Readings.  6/19 - 17 6/18 - None  6/17 - None  6/16 - None  6/15 - 13

## 2025-06-29 DIAGNOSIS — I50.9 ACUTE ON CHRONIC CONGESTIVE HEART FAILURE, UNSPECIFIED HEART FAILURE TYPE (HCC): ICD-10-CM

## 2025-07-01 ENCOUNTER — TELEPHONE (OUTPATIENT)
Dept: CARDIOLOGY CLINIC | Facility: CLINIC | Age: 60
End: 2025-07-01

## 2025-07-01 RX ORDER — POTASSIUM CHLORIDE 1500 MG/1
40 TABLET, EXTENDED RELEASE ORAL
Qty: 540 TABLET | Refills: 3 | Status: SHIPPED | OUTPATIENT
Start: 2025-07-01

## 2025-07-01 NOTE — TELEPHONE ENCOUNTER
Patient called stating he wanted to check on CardioMems.  Discussed with patient last 11 days of CardioMems readings with today's reading of 15.  Patient stated he is feeling really well. Has baseline SOB & edema to legs.  Stated he is taking cardiac medications a ordered.  Asked if he should get blood work before next scheduled OV with Dr Wilcox on 7/3.  Advised patient there is an order in his chart for BMP.  Since he has not had blood work since 5/12, advised him to get BMP before OV. Patient stated he will go for BMP tomorrow morning.

## 2025-07-03 ENCOUNTER — TELEPHONE (OUTPATIENT)
Dept: CARDIOLOGY CLINIC | Facility: CLINIC | Age: 60
End: 2025-07-03
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
## 2025-07-07 ENCOUNTER — CLINICAL SUPPORT (OUTPATIENT)
Dept: CARDIOLOGY CLINIC | Facility: CLINIC | Age: 60
End: 2025-07-07
Payer: COMMERCIAL

## 2025-07-07 DIAGNOSIS — I50.32 CHRONIC HEART FAILURE WITH PRESERVED EJECTION FRACTION (HCC): Primary | ICD-10-CM

## 2025-07-07 PROCEDURE — 93264 REM MNTR WRLS P-ART PRS SNR: CPT | Performed by: INTERNAL MEDICINE

## 2025-07-15 ENCOUNTER — OFFICE VISIT (OUTPATIENT)
Dept: PODIATRY | Facility: CLINIC | Age: 60
End: 2025-07-15
Payer: COMMERCIAL

## 2025-07-15 VITALS — RESPIRATION RATE: 18 BRPM | HEIGHT: 73 IN | WEIGHT: 304 LBS | BODY MASS INDEX: 40.29 KG/M2

## 2025-07-15 DIAGNOSIS — E11.42 DIABETIC POLYNEUROPATHY ASSOCIATED WITH TYPE 2 DIABETES MELLITUS (HCC): Primary | ICD-10-CM

## 2025-07-15 DIAGNOSIS — B35.1 ONYCHOMYCOSIS: ICD-10-CM

## 2025-07-15 PROCEDURE — 11721 DEBRIDE NAIL 6 OR MORE: CPT | Performed by: PODIATRIST

## 2025-07-15 PROCEDURE — 99203 OFFICE O/P NEW LOW 30 MIN: CPT | Performed by: PODIATRIST

## 2025-07-23 ENCOUNTER — OFFICE VISIT (OUTPATIENT)
Dept: FAMILY MEDICINE CLINIC | Facility: CLINIC | Age: 60
End: 2025-07-23
Payer: COMMERCIAL

## 2025-07-23 VITALS
TEMPERATURE: 97.6 F | SYSTOLIC BLOOD PRESSURE: 124 MMHG | DIASTOLIC BLOOD PRESSURE: 86 MMHG | BODY MASS INDEX: 41.67 KG/M2 | HEART RATE: 105 BPM | HEIGHT: 73 IN | RESPIRATION RATE: 17 BRPM | WEIGHT: 314.4 LBS | OXYGEN SATURATION: 98 %

## 2025-07-23 DIAGNOSIS — B35.1 ONYCHOMYCOSIS: ICD-10-CM

## 2025-07-23 DIAGNOSIS — E55.9 VITAMIN D DEFICIENCY: Chronic | ICD-10-CM

## 2025-07-23 DIAGNOSIS — J45.40 MODERATE PERSISTENT ASTHMA WITHOUT COMPLICATION: ICD-10-CM

## 2025-07-23 DIAGNOSIS — I50.32 CHRONIC HEART FAILURE WITH PRESERVED EJECTION FRACTION (HFPEF, >= 50%) (HCC): Chronic | ICD-10-CM

## 2025-07-23 DIAGNOSIS — E78.2 MIXED HYPERLIPIDEMIA: Chronic | ICD-10-CM

## 2025-07-23 DIAGNOSIS — Z12.11 COLON CANCER SCREENING: ICD-10-CM

## 2025-07-23 DIAGNOSIS — E61.1 IRON DEFICIENCY: ICD-10-CM

## 2025-07-23 DIAGNOSIS — E53.8 VITAMIN B12 DEFICIENCY: Chronic | ICD-10-CM

## 2025-07-23 DIAGNOSIS — G47.33 OSA (OBSTRUCTIVE SLEEP APNEA): Chronic | ICD-10-CM

## 2025-07-23 DIAGNOSIS — D63.1 ANEMIA DUE TO STAGE 3A CHRONIC KIDNEY DISEASE  (HCC): ICD-10-CM

## 2025-07-23 DIAGNOSIS — Z12.12 SCREENING FOR COLORECTAL CANCER: Primary | ICD-10-CM

## 2025-07-23 DIAGNOSIS — I10 PRIMARY HYPERTENSION: ICD-10-CM

## 2025-07-23 DIAGNOSIS — E66.01 MORBID OBESITY DUE TO EXCESS CALORIES (HCC): Chronic | ICD-10-CM

## 2025-07-23 DIAGNOSIS — N18.32 STAGE 3B CHRONIC KIDNEY DISEASE (HCC): ICD-10-CM

## 2025-07-23 DIAGNOSIS — M17.0 PRIMARY OSTEOARTHRITIS OF BOTH KNEES: Chronic | ICD-10-CM

## 2025-07-23 DIAGNOSIS — M25.542 ARTHRALGIA OF BOTH HANDS: ICD-10-CM

## 2025-07-23 DIAGNOSIS — J45.20 MILD INTERMITTENT ASTHMA WITHOUT COMPLICATION: ICD-10-CM

## 2025-07-23 DIAGNOSIS — Z87.2 HISTORY OF PSORIASIS: ICD-10-CM

## 2025-07-23 DIAGNOSIS — K86.2 PANCREATIC CYST: ICD-10-CM

## 2025-07-23 DIAGNOSIS — Z12.11 SCREENING FOR COLORECTAL CANCER: Primary | ICD-10-CM

## 2025-07-23 DIAGNOSIS — E11.9 TYPE 2 DIABETES MELLITUS WITHOUT COMPLICATION, WITHOUT LONG-TERM CURRENT USE OF INSULIN (HCC): ICD-10-CM

## 2025-07-23 DIAGNOSIS — N18.31 ANEMIA DUE TO STAGE 3A CHRONIC KIDNEY DISEASE  (HCC): ICD-10-CM

## 2025-07-23 DIAGNOSIS — Z95.818 PRESENCE OF CARDIOMEMS HF SYSTEM: Chronic | ICD-10-CM

## 2025-07-23 DIAGNOSIS — M25.541 ARTHRALGIA OF BOTH HANDS: ICD-10-CM

## 2025-07-23 DIAGNOSIS — K76.9 LIVER LESION, LEFT LOBE: ICD-10-CM

## 2025-07-23 DIAGNOSIS — I48.0 PAROXYSMAL ATRIAL FIBRILLATION (HCC): Chronic | ICD-10-CM

## 2025-07-23 LAB — SL AMB POCT HEMOGLOBIN AIC: 6.3 (ref ?–6.5)

## 2025-07-23 PROCEDURE — 99214 OFFICE O/P EST MOD 30 MIN: CPT | Performed by: NURSE PRACTITIONER

## 2025-07-23 PROCEDURE — 83036 HEMOGLOBIN GLYCOSYLATED A1C: CPT | Performed by: NURSE PRACTITIONER

## 2025-07-23 RX ORDER — ALBUTEROL SULFATE 90 UG/1
2 INHALANT RESPIRATORY (INHALATION) EVERY 4 HOURS PRN
Qty: 18 G | Refills: 3 | Status: SHIPPED | OUTPATIENT
Start: 2025-07-23

## 2025-07-23 NOTE — ASSESSMENT & PLAN NOTE
Lab Results   Component Value Date    EGFR 38 05/12/2025    EGFR 24 05/07/2025    EGFR 32 04/27/2025    CREATININE 1.90 (H) 05/12/2025    CREATININE 2.79 (H) 05/07/2025    CREATININE 2.16 (H) 04/27/2025     Kidney function stable.  Low-salt diet.  Refrain from NSAIDs.  Orders:    Comprehensive metabolic panel; Future

## 2025-07-23 NOTE — ASSESSMENT & PLAN NOTE
Wt Readings from Last 3 Encounters:   07/23/25 (!) 143 kg (314 lb 6.4 oz)   07/15/25 (!) 138 kg (304 lb)   06/12/25 (!) 141 kg (310 lb)     Continue Bumex 6 TID + metolazone 5mg  three x weekly--volume status stable, weights stable.    Con't Jardiance and Spironolactone. BP's stable 120's  Repeat BMP prior to next appt.    RTO in July 3 rd w/Dr. Wilcox    No change in daily weights at home.  Normal weights ranged between 306 to 308 pounds.

## 2025-07-23 NOTE — ASSESSMENT & PLAN NOTE
Surveillance lipid panel ordered.  Continue on statin.  Low-fat diet.  Orders:    Lipid Panel with Direct LDL reflex; Future

## 2025-07-23 NOTE — ASSESSMENT & PLAN NOTE
Surveillance blood work ordered.  Orders:    Iron Panel (Includes Ferritin, Iron Sat%, Iron, and TIBC); Future    CBC and differential; Future

## 2025-07-23 NOTE — ASSESSMENT & PLAN NOTE
Wt Readings from Last 3 Encounters:   07/23/25 (!) 143 kg (314 lb 6.4 oz)   07/15/25 (!) 138 kg (304 lb)   06/12/25 (!) 141 kg (310 lb)

## 2025-07-23 NOTE — ASSESSMENT & PLAN NOTE
Most recent hemoglobin A1c was 6.3.  He is scheduled for an upcoming diabetic eye exam.  Recent visit with podiatry.  Lab Results   Component Value Date    HGBA1C 6.3 07/23/2025       Orders:    POCT hemoglobin A1c

## 2025-07-23 NOTE — PROGRESS NOTES
Name: Mesfin Cano      : 1965      MRN: [id number]  Encounter Provider: RODRIGUE Hodge  Encounter Date: 2025   Encounter department: [facility]  :  Assessment & Plan  Screening for colorectal cancer  Patient due for colon cancer screening.  Will refer back to colorectal to schedule.       Type 2 diabetes mellitus without complication, without long-term current use of insulin (HCC)  Most recent hemoglobin A1c was 6.3.  He is scheduled for an upcoming diabetic eye exam.  Recent visit with podiatry.  Lab Results   Component Value Date    HGBA1C 6.3 2025       Orders:    POCT hemoglobin A1c    Chronic heart failure with preserved ejection fraction (HFpEF, >= 50%) (HCC)  Wt Readings from Last 3 Encounters:   25 (!) 143 kg (314 lb 6.4 oz)   07/15/25 (!) 138 kg (304 lb)   25 (!) 141 kg (310 lb)     Continue Bumex 6 TID + metolazone 5mg  three x weekly--volume status stable, weights stable.    Con't Jardiance and Spironolactone. BP's stable 120's  Repeat BMP prior to next appt.    RTO in July 3 rd w/Dr. Wilcox    No change in daily weights at home.  Normal weights ranged between 306 to 308 pounds.           Paroxysmal atrial fibrillation (HCC)  Patient continues on metoprolol 50 mg daily.  Continues on Eliquis.       Primary hypertension  Blood pressure in the office today is 124/86.       Moderate persistent asthma without complication  Asthma well-controlled.       VICKY (obstructive sleep apnea)  Continues to follow with sleep medicine.       Liver lesion, left lobe  MRI of the abdomen ordered at his last visit.  Reminded patient to schedule this.       Pancreatic cyst  MRI of the abdomen awaiting scheduling.       Onychomycosis  Follows with podiatry.       Primary osteoarthritis of both knees  Continue to work on weight loss.       Stage 3b chronic kidney disease (HCC)  Lab Results   Component Value Date    EGFR 38 2025    EGFR 24 2025    EGFR 32  04/27/2025    CREATININE 1.90 (H) 05/12/2025    CREATININE 2.79 (H) 05/07/2025    CREATININE 2.16 (H) 04/27/2025     Kidney function stable.  Low-salt diet.  Refrain from NSAIDs.  Orders:    Comprehensive metabolic panel; Future    Anemia due to stage 3a chronic kidney disease  (HCC)  Lab Results   Component Value Date    EGFR 38 05/12/2025    EGFR 24 05/07/2025    EGFR 32 04/27/2025    CREATININE 1.90 (H) 05/12/2025    CREATININE 2.79 (H) 05/07/2025    CREATININE 2.16 (H) 04/27/2025     Surveillance blood work ordered.       Hyperlipidemia  Surveillance lipid panel ordered.  Continue on statin.  Low-fat diet.  Orders:    Lipid Panel with Direct LDL reflex; Future    Morbid obesity (HCC)  BMI 41.  Lifestyle modifications discussed.       Presence of CardioMEMS HF system  Managed by cardiology.       Vitamin D deficiency         Vitamin B12 deficiency         Iron deficiency  Surveillance blood work ordered.  Orders:    Iron Panel (Includes Ferritin, Iron Sat%, Iron, and TIBC); Future    CBC and differential; Future    Colon cancer screening    Orders:    Ambulatory Referral to Colorectal Surgery; Future    Mild intermittent asthma without complication  Well-controlled.  Follows with pulmonology.  Orders:    albuterol (PROVENTIL HFA,VENTOLIN HFA) 90 mcg/act inhaler; Inhale 2 puffs every 4 (four) hours as needed for wheezing    Arthralgia of both hands  Patient with a history of psoriasis.  Patient with swelling in his fingers as well as joint pain.  He was recently seen in the emergency room at which time they diagnosed him with psoriatic arthritis.  I will refer him to rheumatology.  Orders:    Ambulatory Referral to Rheumatology; Future    History of psoriasis  Well-controlled at present.  Orders:    Ambulatory Referral to Rheumatology; Future           History of Present Illness   Patient presents to the office today for follow-up.  He continues to follow with cardiology, optometry, podiatry, nephrology and  "pulmonology.  He has no new medical concerns.  He has had several hospitalizations over the last year.  He is being treated for chronic heart failure.  He is performing daily weights.  He is watching the salt in his diet.  Diabetes well-controlled.      Review of Systems   Constitutional: Negative.  Negative for fatigue.   HENT: Negative.  Negative for congestion, postnasal drip, rhinorrhea and trouble swallowing.    Eyes: Negative.  Negative for visual disturbance.   Respiratory: Negative.  Negative for choking and shortness of breath.    Cardiovascular:  Positive for leg swelling (Chronic). Negative for chest pain.   Gastrointestinal: Negative.    Endocrine: Negative.    Genitourinary: Negative.    Musculoskeletal:  Positive for arthralgias and joint swelling (Fingers). Negative for back pain, myalgias and neck pain.   Skin: Negative.    Neurological:  Negative for dizziness and headaches.   Hematological:  Bruises/bleeds easily (On Eliquis).   Psychiatric/Behavioral: Negative.         Objective   /86 (BP Location: Left arm, Patient Position: Sitting, Cuff Size: Adult)   Pulse 105   Temp 97.6 °F (36.4 °C) (Temporal)   Resp 17   Ht 6' 1\" (1.854 m)   Wt (!) 143 kg (314 lb 6.4 oz)   SpO2 98%   BMI 41.48 kg/m²      Physical Exam  Vitals reviewed.   Constitutional:       Appearance: Normal appearance. He is morbidly obese.   HENT:      Head: Normocephalic and atraumatic.      Nose: Nose normal.      Mouth/Throat:      Mouth: Mucous membranes are moist.     Eyes:      Extraocular Movements: Extraocular movements intact.      Pupils: Pupils are equal, round, and reactive to light.       Cardiovascular:      Rate and Rhythm: Normal rate and regular rhythm.      Pulses: Normal pulses.      Heart sounds: Normal heart sounds.   Pulmonary:      Effort: Pulmonary effort is normal.      Breath sounds: Normal breath sounds.     Musculoskeletal:         General: Normal range of motion.     Skin:     General: Skin is " warm.     Neurological:      General: No focal deficit present.      Mental Status: He is alert and oriented to person, place, and time. Mental status is at baseline.     Psychiatric:         Mood and Affect: Mood normal.         Behavior: Behavior normal.         Thought Content: Thought content normal.         Judgment: Judgment normal.

## 2025-07-23 NOTE — ASSESSMENT & PLAN NOTE
Lab Results   Component Value Date    EGFR 38 05/12/2025    EGFR 24 05/07/2025    EGFR 32 04/27/2025    CREATININE 1.90 (H) 05/12/2025    CREATININE 2.79 (H) 05/07/2025    CREATININE 2.16 (H) 04/27/2025     Surveillance blood work ordered.

## 2025-07-27 ENCOUNTER — APPOINTMENT (EMERGENCY)
Dept: RADIOLOGY | Facility: HOSPITAL | Age: 60
DRG: 291 | End: 2025-07-27
Payer: COMMERCIAL

## 2025-07-27 ENCOUNTER — HOSPITAL ENCOUNTER (INPATIENT)
Facility: HOSPITAL | Age: 60
LOS: 7 days | Discharge: HOME | DRG: 291 | End: 2025-08-03
Attending: EMERGENCY MEDICINE | Admitting: STUDENT IN AN ORGANIZED HEALTH CARE EDUCATION/TRAINING PROGRAM
Payer: COMMERCIAL

## 2025-07-27 DIAGNOSIS — J44.1 COPD EXACERBATION (HCC): Primary | ICD-10-CM

## 2025-07-27 DIAGNOSIS — R60.1 ANASARCA: ICD-10-CM

## 2025-07-27 DIAGNOSIS — I50.33 ACUTE ON CHRONIC HEART FAILURE WITH PRESERVED EJECTION FRACTION (HFPEF) (HCC): ICD-10-CM

## 2025-07-27 LAB
2HR DELTA HS TROPONIN: -2 NG/L
ALBUMIN SERPL BCG-MCNC: 3.6 G/DL (ref 3.5–5)
ALP SERPL-CCNC: 96 U/L (ref 34–104)
ALT SERPL W P-5'-P-CCNC: 5 U/L (ref 7–52)
ANION GAP SERPL CALCULATED.3IONS-SCNC: 15 MMOL/L (ref 4–13)
AST SERPL W P-5'-P-CCNC: 10 U/L (ref 13–39)
BASOPHILS # BLD AUTO: 0.05 THOUSANDS/ÂΜL (ref 0–0.1)
BASOPHILS NFR BLD AUTO: 1 % (ref 0–1)
BILIRUB SERPL-MCNC: 0.42 MG/DL (ref 0.2–1)
BNP SERPL-MCNC: 253 PG/ML (ref 0–100)
BUN SERPL-MCNC: 19 MG/DL (ref 5–25)
CALCIUM SERPL-MCNC: 8 MG/DL (ref 8.4–10.2)
CARDIAC TROPONIN I PNL SERPL HS: 15 NG/L (ref ?–50)
CARDIAC TROPONIN I PNL SERPL HS: 17 NG/L (ref ?–50)
CHLORIDE SERPL-SCNC: 101 MMOL/L (ref 96–108)
CO2 SERPL-SCNC: 21 MMOL/L (ref 21–32)
CREAT SERPL-MCNC: 1.26 MG/DL (ref 0.6–1.3)
EOSINOPHIL # BLD AUTO: 0.22 THOUSAND/ÂΜL (ref 0–0.61)
EOSINOPHIL NFR BLD AUTO: 4 % (ref 0–6)
ERYTHROCYTE [DISTWIDTH] IN BLOOD BY AUTOMATED COUNT: 15.8 % (ref 11.6–15.1)
GFR SERPL CREATININE-BSD FRML MDRD: 62 ML/MIN/1.73SQ M
GLUCOSE SERPL-MCNC: 300 MG/DL (ref 65–140)
HCT VFR BLD AUTO: 35.2 % (ref 36.5–49.3)
HGB BLD-MCNC: 11.3 G/DL (ref 12–17)
IMM GRANULOCYTES # BLD AUTO: 0.03 THOUSAND/UL (ref 0–0.2)
IMM GRANULOCYTES NFR BLD AUTO: 1 % (ref 0–2)
LYMPHOCYTES # BLD AUTO: 1 THOUSANDS/ÂΜL (ref 0.6–4.47)
LYMPHOCYTES NFR BLD AUTO: 17 % (ref 14–44)
MAGNESIUM SERPL-MCNC: 2.1 MG/DL (ref 1.9–2.7)
MCH RBC QN AUTO: 29.9 PG (ref 26.8–34.3)
MCHC RBC AUTO-ENTMCNC: 32.1 G/DL (ref 31.4–37.4)
MCV RBC AUTO: 93 FL (ref 82–98)
MONOCYTES # BLD AUTO: 0.59 THOUSAND/ÂΜL (ref 0.17–1.22)
MONOCYTES NFR BLD AUTO: 10 % (ref 4–12)
NEUTROPHILS # BLD AUTO: 4.11 THOUSANDS/ÂΜL (ref 1.85–7.62)
NEUTS SEG NFR BLD AUTO: 67 % (ref 43–75)
NRBC BLD AUTO-RTO: 0 /100 WBCS
PLATELET # BLD AUTO: 235 THOUSANDS/UL (ref 149–390)
PMV BLD AUTO: 9.7 FL (ref 8.9–12.7)
POTASSIUM SERPL-SCNC: 3.5 MMOL/L (ref 3.5–5.3)
PROT SERPL-MCNC: 6.4 G/DL (ref 6.4–8.4)
RBC # BLD AUTO: 3.78 MILLION/UL (ref 3.88–5.62)
SODIUM SERPL-SCNC: 137 MMOL/L (ref 135–147)
WBC # BLD AUTO: 6 THOUSAND/UL (ref 4.31–10.16)

## 2025-07-27 PROCEDURE — 99285 EMERGENCY DEPT VISIT HI MDM: CPT

## 2025-07-27 PROCEDURE — 80053 COMPREHEN METABOLIC PANEL: CPT

## 2025-07-27 PROCEDURE — 85025 COMPLETE CBC W/AUTO DIFF WBC: CPT

## 2025-07-27 PROCEDURE — 83880 ASSAY OF NATRIURETIC PEPTIDE: CPT

## 2025-07-27 PROCEDURE — 83735 ASSAY OF MAGNESIUM: CPT

## 2025-07-27 PROCEDURE — 71046 X-RAY EXAM CHEST 2 VIEWS: CPT

## 2025-07-27 PROCEDURE — 99285 EMERGENCY DEPT VISIT HI MDM: CPT | Performed by: EMERGENCY MEDICINE

## 2025-07-27 PROCEDURE — 99223 1ST HOSP IP/OBS HIGH 75: CPT | Performed by: STUDENT IN AN ORGANIZED HEALTH CARE EDUCATION/TRAINING PROGRAM

## 2025-07-27 PROCEDURE — 84484 ASSAY OF TROPONIN QUANT: CPT

## 2025-07-27 PROCEDURE — 36415 COLL VENOUS BLD VENIPUNCTURE: CPT

## 2025-07-27 PROCEDURE — 93005 ELECTROCARDIOGRAM TRACING: CPT

## 2025-07-27 RX ORDER — METOPROLOL SUCCINATE 50 MG/1
50 TABLET, EXTENDED RELEASE ORAL DAILY
Status: DISCONTINUED | OUTPATIENT
Start: 2025-07-28 | End: 2025-08-03 | Stop reason: HOSPADM

## 2025-07-27 RX ORDER — BUMETANIDE 0.25 MG/ML
1 INJECTION INTRAMUSCULAR; INTRAVENOUS CONTINUOUS
Status: DISCONTINUED | OUTPATIENT
Start: 2025-07-27 | End: 2025-07-28

## 2025-07-27 RX ORDER — POTASSIUM CHLORIDE 1500 MG/1
40 TABLET, EXTENDED RELEASE ORAL
Status: DISCONTINUED | OUTPATIENT
Start: 2025-07-27 | End: 2025-08-03 | Stop reason: HOSPADM

## 2025-07-27 RX ORDER — MONTELUKAST SODIUM 10 MG/1
10 TABLET ORAL
Status: DISCONTINUED | OUTPATIENT
Start: 2025-07-27 | End: 2025-08-03 | Stop reason: HOSPADM

## 2025-07-27 RX ORDER — METOLAZONE 5 MG/1
5 TABLET ORAL 3 TIMES WEEKLY
Status: DISCONTINUED | OUTPATIENT
Start: 2025-07-29 | End: 2025-08-03 | Stop reason: HOSPADM

## 2025-07-27 RX ORDER — BUMETANIDE 0.25 MG/ML
6 INJECTION, SOLUTION INTRAMUSCULAR; INTRAVENOUS ONCE
Status: COMPLETED | OUTPATIENT
Start: 2025-07-27 | End: 2025-07-27

## 2025-07-27 RX ORDER — ALBUTEROL SULFATE 90 UG/1
2 INHALANT RESPIRATORY (INHALATION) EVERY 4 HOURS PRN
Status: DISCONTINUED | OUTPATIENT
Start: 2025-07-27 | End: 2025-08-03 | Stop reason: HOSPADM

## 2025-07-27 RX ORDER — BUDESONIDE AND FORMOTEROL FUMARATE DIHYDRATE 160; 4.5 UG/1; UG/1
2 AEROSOL RESPIRATORY (INHALATION) 2 TIMES DAILY
Status: DISCONTINUED | OUTPATIENT
Start: 2025-07-27 | End: 2025-08-03 | Stop reason: HOSPADM

## 2025-07-27 RX ORDER — INSULIN LISPRO 100 [IU]/ML
1-6 INJECTION, SOLUTION INTRAVENOUS; SUBCUTANEOUS
Status: DISCONTINUED | OUTPATIENT
Start: 2025-07-27 | End: 2025-08-03 | Stop reason: HOSPADM

## 2025-07-27 RX ORDER — SPIRONOLACTONE 25 MG/1
25 TABLET ORAL 2 TIMES DAILY
Status: DISCONTINUED | OUTPATIENT
Start: 2025-07-27 | End: 2025-08-03 | Stop reason: HOSPADM

## 2025-07-27 RX ORDER — LANOLIN ALCOHOL/MO/W.PET/CERES
400 CREAM (GRAM) TOPICAL 2 TIMES DAILY
Status: DISCONTINUED | OUTPATIENT
Start: 2025-07-27 | End: 2025-08-03 | Stop reason: HOSPADM

## 2025-07-27 RX ORDER — PREGABALIN 50 MG/1
50 CAPSULE ORAL
Status: DISCONTINUED | OUTPATIENT
Start: 2025-07-27 | End: 2025-08-03 | Stop reason: HOSPADM

## 2025-07-27 RX ADMIN — SPIRONOLACTONE 25 MG: 25 TABLET ORAL at 17:55

## 2025-07-27 RX ADMIN — PREGABALIN 50 MG: 50 CAPSULE ORAL at 22:33

## 2025-07-27 RX ADMIN — BUDESONIDE AND FORMOTEROL FUMARATE DIHYDRATE 2 PUFF: 160; 4.5 AEROSOL RESPIRATORY (INHALATION) at 19:25

## 2025-07-27 RX ADMIN — POTASSIUM CHLORIDE 40 MEQ: 1500 TABLET, EXTENDED RELEASE ORAL at 17:55

## 2025-07-27 RX ADMIN — MAGNESIUM OXIDE TAB 400 MG (241.3 MG ELEMENTAL MG) 400 MG: 400 (241.3 MG) TAB at 17:55

## 2025-07-27 RX ADMIN — Medication 1 MG/HR: at 18:48

## 2025-07-27 RX ADMIN — APIXABAN 5 MG: 5 TABLET, FILM COATED ORAL at 17:55

## 2025-07-27 RX ADMIN — INSULIN LISPRO 4 UNITS: 100 INJECTION, SOLUTION INTRAVENOUS; SUBCUTANEOUS at 17:59

## 2025-07-27 RX ADMIN — MONTELUKAST 10 MG: 10 TABLET, FILM COATED ORAL at 22:33

## 2025-07-27 RX ADMIN — BUMETANIDE 6 MG: 0.25 INJECTION INTRAMUSCULAR; INTRAVENOUS at 18:40

## 2025-07-28 ENCOUNTER — PATIENT OUTREACH (OUTPATIENT)
Dept: CARDIOLOGY CLINIC | Facility: CLINIC | Age: 60
End: 2025-07-28

## 2025-07-28 LAB
ANION GAP SERPL CALCULATED.3IONS-SCNC: 12 MMOL/L (ref 4–13)
ANION GAP SERPL CALCULATED.3IONS-SCNC: 13 MMOL/L (ref 4–13)
ATRIAL RATE: 87 BPM
BASOPHILS # BLD AUTO: 0.06 THOUSANDS/ÂΜL (ref 0–0.1)
BASOPHILS NFR BLD AUTO: 1 % (ref 0–1)
BUN SERPL-MCNC: 21 MG/DL (ref 5–25)
BUN SERPL-MCNC: 23 MG/DL (ref 5–25)
CALCIUM SERPL-MCNC: 7.9 MG/DL (ref 8.4–10.2)
CALCIUM SERPL-MCNC: 8 MG/DL (ref 8.4–10.2)
CHLORIDE SERPL-SCNC: 93 MMOL/L (ref 96–108)
CHLORIDE SERPL-SCNC: 97 MMOL/L (ref 96–108)
CO2 SERPL-SCNC: 26 MMOL/L (ref 21–32)
CO2 SERPL-SCNC: 27 MMOL/L (ref 21–32)
CREAT SERPL-MCNC: 1.4 MG/DL (ref 0.6–1.3)
CREAT SERPL-MCNC: 1.54 MG/DL (ref 0.6–1.3)
EOSINOPHIL # BLD AUTO: 0.2 THOUSAND/ÂΜL (ref 0–0.61)
EOSINOPHIL NFR BLD AUTO: 4 % (ref 0–6)
ERYTHROCYTE [DISTWIDTH] IN BLOOD BY AUTOMATED COUNT: 15.9 % (ref 11.6–15.1)
GFR SERPL CREATININE-BSD FRML MDRD: 49 ML/MIN/1.73SQ M
GFR SERPL CREATININE-BSD FRML MDRD: 54 ML/MIN/1.73SQ M
GLUCOSE SERPL-MCNC: 152 MG/DL (ref 65–140)
GLUCOSE SERPL-MCNC: 164 MG/DL (ref 65–140)
GLUCOSE SERPL-MCNC: 179 MG/DL (ref 65–140)
GLUCOSE SERPL-MCNC: 199 MG/DL (ref 65–140)
GLUCOSE SERPL-MCNC: 217 MG/DL (ref 65–140)
HCT VFR BLD AUTO: 35.4 % (ref 36.5–49.3)
HGB BLD-MCNC: 11.2 G/DL (ref 12–17)
IMM GRANULOCYTES # BLD AUTO: 0.04 THOUSAND/UL (ref 0–0.2)
IMM GRANULOCYTES NFR BLD AUTO: 1 % (ref 0–2)
LYMPHOCYTES # BLD AUTO: 0.79 THOUSANDS/ÂΜL (ref 0.6–4.47)
LYMPHOCYTES NFR BLD AUTO: 15 % (ref 14–44)
MAGNESIUM SERPL-MCNC: 2 MG/DL (ref 1.9–2.7)
MCH RBC QN AUTO: 29.7 PG (ref 26.8–34.3)
MCHC RBC AUTO-ENTMCNC: 31.6 G/DL (ref 31.4–37.4)
MCV RBC AUTO: 94 FL (ref 82–98)
MONOCYTES # BLD AUTO: 0.63 THOUSAND/ÂΜL (ref 0.17–1.22)
MONOCYTES NFR BLD AUTO: 12 % (ref 4–12)
NEUTROPHILS # BLD AUTO: 3.74 THOUSANDS/ÂΜL (ref 1.85–7.62)
NEUTS SEG NFR BLD AUTO: 67 % (ref 43–75)
NRBC BLD AUTO-RTO: 0 /100 WBCS
P AXIS: 76 DEGREES
PLATELET # BLD AUTO: 221 THOUSANDS/UL (ref 149–390)
PMV BLD AUTO: 9.4 FL (ref 8.9–12.7)
POTASSIUM SERPL-SCNC: 3.8 MMOL/L (ref 3.5–5.3)
POTASSIUM SERPL-SCNC: 4.4 MMOL/L (ref 3.5–5.3)
PR INTERVAL: 152 MS
QRS AXIS: 40 DEGREES
QRSD INTERVAL: 80 MS
QT INTERVAL: 382 MS
QTC INTERVAL: 459 MS
RBC # BLD AUTO: 3.77 MILLION/UL (ref 3.88–5.62)
SODIUM SERPL-SCNC: 133 MMOL/L (ref 135–147)
SODIUM SERPL-SCNC: 135 MMOL/L (ref 135–147)
T WAVE AXIS: 62 DEGREES
VENTRICULAR RATE: 87 BPM
WBC # BLD AUTO: 5.46 THOUSAND/UL (ref 4.31–10.16)

## 2025-07-28 PROCEDURE — 99222 1ST HOSP IP/OBS MODERATE 55: CPT | Performed by: INTERNAL MEDICINE

## 2025-07-28 PROCEDURE — 85025 COMPLETE CBC W/AUTO DIFF WBC: CPT | Performed by: STUDENT IN AN ORGANIZED HEALTH CARE EDUCATION/TRAINING PROGRAM

## 2025-07-28 PROCEDURE — 93010 ELECTROCARDIOGRAM REPORT: CPT | Performed by: INTERNAL MEDICINE

## 2025-07-28 PROCEDURE — 82948 REAGENT STRIP/BLOOD GLUCOSE: CPT

## 2025-07-28 PROCEDURE — 99232 SBSQ HOSP IP/OBS MODERATE 35: CPT | Performed by: PHYSICIAN ASSISTANT

## 2025-07-28 PROCEDURE — 83735 ASSAY OF MAGNESIUM: CPT | Performed by: STUDENT IN AN ORGANIZED HEALTH CARE EDUCATION/TRAINING PROGRAM

## 2025-07-28 PROCEDURE — 80048 BASIC METABOLIC PNL TOTAL CA: CPT | Performed by: STUDENT IN AN ORGANIZED HEALTH CARE EDUCATION/TRAINING PROGRAM

## 2025-07-28 PROCEDURE — 80048 BASIC METABOLIC PNL TOTAL CA: CPT

## 2025-07-28 RX ORDER — BUMETANIDE 0.25 MG/ML
1.5 INJECTION INTRAMUSCULAR; INTRAVENOUS CONTINUOUS
Status: DISCONTINUED | OUTPATIENT
Start: 2025-07-28 | End: 2025-07-30

## 2025-07-28 RX ADMIN — POTASSIUM CHLORIDE 40 MEQ: 1500 TABLET, EXTENDED RELEASE ORAL at 12:27

## 2025-07-28 RX ADMIN — EMPAGLIFLOZIN 10 MG: 10 TABLET, FILM COATED ORAL at 14:24

## 2025-07-28 RX ADMIN — MAGNESIUM OXIDE TAB 400 MG (241.3 MG ELEMENTAL MG) 400 MG: 400 (241.3 MG) TAB at 08:06

## 2025-07-28 RX ADMIN — POTASSIUM CHLORIDE 40 MEQ: 1500 TABLET, EXTENDED RELEASE ORAL at 17:10

## 2025-07-28 RX ADMIN — APIXABAN 5 MG: 5 TABLET, FILM COATED ORAL at 17:10

## 2025-07-28 RX ADMIN — SPIRONOLACTONE 25 MG: 25 TABLET ORAL at 08:06

## 2025-07-28 RX ADMIN — METOPROLOL SUCCINATE 50 MG: 50 TABLET, EXTENDED RELEASE ORAL at 08:06

## 2025-07-28 RX ADMIN — BUDESONIDE AND FORMOTEROL FUMARATE DIHYDRATE 2 PUFF: 160; 4.5 AEROSOL RESPIRATORY (INHALATION) at 08:08

## 2025-07-28 RX ADMIN — INSULIN LISPRO 2 UNITS: 100 INJECTION, SOLUTION INTRAVENOUS; SUBCUTANEOUS at 12:27

## 2025-07-28 RX ADMIN — POTASSIUM CHLORIDE 40 MEQ: 1500 TABLET, EXTENDED RELEASE ORAL at 08:06

## 2025-07-28 RX ADMIN — SITAGLIPTIN 50 MG: 50 TABLET, FILM COATED ORAL at 14:23

## 2025-07-28 RX ADMIN — MAGNESIUM OXIDE TAB 400 MG (241.3 MG ELEMENTAL MG) 400 MG: 400 (241.3 MG) TAB at 17:10

## 2025-07-28 RX ADMIN — INSULIN LISPRO 1 UNITS: 100 INJECTION, SOLUTION INTRAVENOUS; SUBCUTANEOUS at 08:07

## 2025-07-28 RX ADMIN — Medication 1 MG/HR: at 02:45

## 2025-07-28 RX ADMIN — BUDESONIDE AND FORMOTEROL FUMARATE DIHYDRATE 2 PUFF: 160; 4.5 AEROSOL RESPIRATORY (INHALATION) at 17:10

## 2025-07-28 RX ADMIN — APIXABAN 5 MG: 5 TABLET, FILM COATED ORAL at 08:07

## 2025-07-28 RX ADMIN — INSULIN LISPRO 1 UNITS: 100 INJECTION, SOLUTION INTRAVENOUS; SUBCUTANEOUS at 17:14

## 2025-07-28 RX ADMIN — PREGABALIN 50 MG: 50 CAPSULE ORAL at 21:50

## 2025-07-28 RX ADMIN — Medication 1.5 MG/HR: at 19:31

## 2025-07-28 RX ADMIN — SPIRONOLACTONE 25 MG: 25 TABLET ORAL at 17:10

## 2025-07-28 RX ADMIN — MONTELUKAST 10 MG: 10 TABLET, FILM COATED ORAL at 21:50

## 2025-07-28 RX ADMIN — Medication 1.5 MG/HR: at 12:29

## 2025-07-29 LAB
ANION GAP SERPL CALCULATED.3IONS-SCNC: 12 MMOL/L (ref 4–13)
ANION GAP SERPL CALCULATED.3IONS-SCNC: 14 MMOL/L (ref 4–13)
BUN SERPL-MCNC: 24 MG/DL (ref 5–25)
BUN SERPL-MCNC: 29 MG/DL (ref 5–25)
CALCIUM SERPL-MCNC: 7.9 MG/DL (ref 8.4–10.2)
CALCIUM SERPL-MCNC: 8.2 MG/DL (ref 8.4–10.2)
CHLORIDE SERPL-SCNC: 89 MMOL/L (ref 96–108)
CHLORIDE SERPL-SCNC: 93 MMOL/L (ref 96–108)
CO2 SERPL-SCNC: 30 MMOL/L (ref 21–32)
CO2 SERPL-SCNC: 30 MMOL/L (ref 21–32)
CREAT SERPL-MCNC: 1.7 MG/DL (ref 0.6–1.3)
CREAT SERPL-MCNC: 1.98 MG/DL (ref 0.6–1.3)
GFR SERPL CREATININE-BSD FRML MDRD: 36 ML/MIN/1.73SQ M
GFR SERPL CREATININE-BSD FRML MDRD: 43 ML/MIN/1.73SQ M
GLUCOSE SERPL-MCNC: 161 MG/DL (ref 65–140)
GLUCOSE SERPL-MCNC: 176 MG/DL (ref 65–140)
GLUCOSE SERPL-MCNC: 178 MG/DL (ref 65–140)
GLUCOSE SERPL-MCNC: 201 MG/DL (ref 65–140)
GLUCOSE SERPL-MCNC: 225 MG/DL (ref 65–140)
POTASSIUM SERPL-SCNC: 3.9 MMOL/L (ref 3.5–5.3)
POTASSIUM SERPL-SCNC: 3.9 MMOL/L (ref 3.5–5.3)
SODIUM SERPL-SCNC: 133 MMOL/L (ref 135–147)
SODIUM SERPL-SCNC: 135 MMOL/L (ref 135–147)

## 2025-07-29 PROCEDURE — 99232 SBSQ HOSP IP/OBS MODERATE 35: CPT | Performed by: INTERNAL MEDICINE

## 2025-07-29 PROCEDURE — 82948 REAGENT STRIP/BLOOD GLUCOSE: CPT

## 2025-07-29 PROCEDURE — 80048 BASIC METABOLIC PNL TOTAL CA: CPT | Performed by: INTERNAL MEDICINE

## 2025-07-29 PROCEDURE — 99232 SBSQ HOSP IP/OBS MODERATE 35: CPT | Performed by: PHYSICIAN ASSISTANT

## 2025-07-29 RX ORDER — POTASSIUM CHLORIDE 1500 MG/1
40 TABLET, EXTENDED RELEASE ORAL ONCE
Status: COMPLETED | OUTPATIENT
Start: 2025-07-29 | End: 2025-07-29

## 2025-07-29 RX ORDER — LORATADINE 10 MG/1
10 TABLET ORAL DAILY
Status: DISCONTINUED | OUTPATIENT
Start: 2025-07-29 | End: 2025-08-03 | Stop reason: HOSPADM

## 2025-07-29 RX ADMIN — SPIRONOLACTONE 25 MG: 25 TABLET ORAL at 17:26

## 2025-07-29 RX ADMIN — INSULIN LISPRO 1 UNITS: 100 INJECTION, SOLUTION INTRAVENOUS; SUBCUTANEOUS at 17:24

## 2025-07-29 RX ADMIN — POTASSIUM CHLORIDE 40 MEQ: 1500 TABLET, EXTENDED RELEASE ORAL at 17:24

## 2025-07-29 RX ADMIN — APIXABAN 5 MG: 5 TABLET, FILM COATED ORAL at 08:31

## 2025-07-29 RX ADMIN — Medication 1.5 MG/HR: at 18:20

## 2025-07-29 RX ADMIN — UMECLIDINIUM 1 PUFF: 62.5 AEROSOL, POWDER ORAL at 08:32

## 2025-07-29 RX ADMIN — MAGNESIUM OXIDE TAB 400 MG (241.3 MG ELEMENTAL MG) 400 MG: 400 (241.3 MG) TAB at 17:24

## 2025-07-29 RX ADMIN — MONTELUKAST 10 MG: 10 TABLET, FILM COATED ORAL at 21:56

## 2025-07-29 RX ADMIN — POTASSIUM CHLORIDE 40 MEQ: 1500 TABLET, EXTENDED RELEASE ORAL at 21:56

## 2025-07-29 RX ADMIN — BUDESONIDE AND FORMOTEROL FUMARATE DIHYDRATE 2 PUFF: 160; 4.5 AEROSOL RESPIRATORY (INHALATION) at 08:32

## 2025-07-29 RX ADMIN — LORATADINE 10 MG: 10 TABLET ORAL at 12:19

## 2025-07-29 RX ADMIN — POTASSIUM CHLORIDE 40 MEQ: 1500 TABLET, EXTENDED RELEASE ORAL at 08:31

## 2025-07-29 RX ADMIN — APIXABAN 5 MG: 5 TABLET, FILM COATED ORAL at 17:24

## 2025-07-29 RX ADMIN — POTASSIUM CHLORIDE 40 MEQ: 1500 TABLET, EXTENDED RELEASE ORAL at 12:19

## 2025-07-29 RX ADMIN — METOPROLOL SUCCINATE 50 MG: 50 TABLET, EXTENDED RELEASE ORAL at 08:31

## 2025-07-29 RX ADMIN — SPIRONOLACTONE 25 MG: 25 TABLET ORAL at 08:31

## 2025-07-29 RX ADMIN — EMPAGLIFLOZIN 10 MG: 10 TABLET, FILM COATED ORAL at 08:32

## 2025-07-29 RX ADMIN — BUDESONIDE AND FORMOTEROL FUMARATE DIHYDRATE 2 PUFF: 160; 4.5 AEROSOL RESPIRATORY (INHALATION) at 17:24

## 2025-07-29 RX ADMIN — Medication 1.5 MG/HR: at 10:43

## 2025-07-29 RX ADMIN — METOLAZONE 5 MG: 5 TABLET ORAL at 08:39

## 2025-07-29 RX ADMIN — MAGNESIUM OXIDE TAB 400 MG (241.3 MG ELEMENTAL MG) 400 MG: 400 (241.3 MG) TAB at 08:31

## 2025-07-29 RX ADMIN — SITAGLIPTIN 50 MG: 50 TABLET, FILM COATED ORAL at 08:32

## 2025-07-29 RX ADMIN — Medication 1.5 MG/HR: at 03:55

## 2025-07-29 RX ADMIN — INSULIN LISPRO 2 UNITS: 100 INJECTION, SOLUTION INTRAVENOUS; SUBCUTANEOUS at 12:20

## 2025-07-29 RX ADMIN — PREGABALIN 50 MG: 50 CAPSULE ORAL at 21:56

## 2025-07-29 RX ADMIN — INSULIN LISPRO 2 UNITS: 100 INJECTION, SOLUTION INTRAVENOUS; SUBCUTANEOUS at 08:30

## 2025-07-30 LAB
ANION GAP SERPL CALCULATED.3IONS-SCNC: 13 MMOL/L (ref 4–13)
BUN SERPL-MCNC: 34 MG/DL (ref 5–25)
CALCIUM SERPL-MCNC: 8.3 MG/DL (ref 8.4–10.2)
CHLORIDE SERPL-SCNC: 85 MMOL/L (ref 96–108)
CO2 SERPL-SCNC: 32 MMOL/L (ref 21–32)
CREAT SERPL-MCNC: 2.01 MG/DL (ref 0.6–1.3)
GFR SERPL CREATININE-BSD FRML MDRD: 35 ML/MIN/1.73SQ M
GLUCOSE SERPL-MCNC: 183 MG/DL (ref 65–140)
GLUCOSE SERPL-MCNC: 189 MG/DL (ref 65–140)
GLUCOSE SERPL-MCNC: 197 MG/DL (ref 65–140)
GLUCOSE SERPL-MCNC: 218 MG/DL (ref 65–140)
GLUCOSE SERPL-MCNC: 304 MG/DL (ref 65–140)
POTASSIUM SERPL-SCNC: 3.3 MMOL/L (ref 3.5–5.3)
SODIUM SERPL-SCNC: 130 MMOL/L (ref 135–147)

## 2025-07-30 PROCEDURE — 99232 SBSQ HOSP IP/OBS MODERATE 35: CPT | Performed by: PHYSICIAN ASSISTANT

## 2025-07-30 PROCEDURE — 99232 SBSQ HOSP IP/OBS MODERATE 35: CPT | Performed by: INTERNAL MEDICINE

## 2025-07-30 PROCEDURE — 82948 REAGENT STRIP/BLOOD GLUCOSE: CPT

## 2025-07-30 PROCEDURE — 80048 BASIC METABOLIC PNL TOTAL CA: CPT | Performed by: INTERNAL MEDICINE

## 2025-07-30 RX ORDER — BUMETANIDE 0.25 MG/ML
6 INJECTION, SOLUTION INTRAMUSCULAR; INTRAVENOUS
Status: DISCONTINUED | OUTPATIENT
Start: 2025-07-30 | End: 2025-07-31

## 2025-07-30 RX ORDER — POTASSIUM CHLORIDE 1500 MG/1
40 TABLET, EXTENDED RELEASE ORAL ONCE
Status: COMPLETED | OUTPATIENT
Start: 2025-07-30 | End: 2025-07-30

## 2025-07-30 RX ADMIN — MAGNESIUM OXIDE TAB 400 MG (241.3 MG ELEMENTAL MG) 400 MG: 400 (241.3 MG) TAB at 08:08

## 2025-07-30 RX ADMIN — POTASSIUM CHLORIDE 40 MEQ: 1500 TABLET, EXTENDED RELEASE ORAL at 17:04

## 2025-07-30 RX ADMIN — APIXABAN 5 MG: 5 TABLET, FILM COATED ORAL at 08:08

## 2025-07-30 RX ADMIN — BUMETANIDE 6 MG: 0.25 INJECTION INTRAMUSCULAR; INTRAVENOUS at 17:50

## 2025-07-30 RX ADMIN — SPIRONOLACTONE 25 MG: 25 TABLET ORAL at 08:08

## 2025-07-30 RX ADMIN — Medication 1.5 MG/HR: at 09:12

## 2025-07-30 RX ADMIN — LORATADINE 10 MG: 10 TABLET ORAL at 08:08

## 2025-07-30 RX ADMIN — BUDESONIDE AND FORMOTEROL FUMARATE DIHYDRATE 2 PUFF: 160; 4.5 AEROSOL RESPIRATORY (INHALATION) at 17:07

## 2025-07-30 RX ADMIN — Medication 1.5 MG/HR: at 01:07

## 2025-07-30 RX ADMIN — METOPROLOL SUCCINATE 50 MG: 50 TABLET, EXTENDED RELEASE ORAL at 08:08

## 2025-07-30 RX ADMIN — APIXABAN 5 MG: 5 TABLET, FILM COATED ORAL at 17:05

## 2025-07-30 RX ADMIN — POTASSIUM CHLORIDE 40 MEQ: 1500 TABLET, EXTENDED RELEASE ORAL at 14:10

## 2025-07-30 RX ADMIN — INSULIN LISPRO 2 UNITS: 100 INJECTION, SOLUTION INTRAVENOUS; SUBCUTANEOUS at 13:24

## 2025-07-30 RX ADMIN — INSULIN LISPRO 1 UNITS: 100 INJECTION, SOLUTION INTRAVENOUS; SUBCUTANEOUS at 08:13

## 2025-07-30 RX ADMIN — SPIRONOLACTONE 25 MG: 25 TABLET ORAL at 17:04

## 2025-07-30 RX ADMIN — BUMETANIDE 6 MG: 0.25 INJECTION INTRAMUSCULAR; INTRAVENOUS at 14:12

## 2025-07-30 RX ADMIN — EMPAGLIFLOZIN 10 MG: 10 TABLET, FILM COATED ORAL at 08:10

## 2025-07-30 RX ADMIN — PREGABALIN 50 MG: 50 CAPSULE ORAL at 22:08

## 2025-07-30 RX ADMIN — MAGNESIUM OXIDE TAB 400 MG (241.3 MG ELEMENTAL MG) 400 MG: 400 (241.3 MG) TAB at 17:05

## 2025-07-30 RX ADMIN — UMECLIDINIUM 1 PUFF: 62.5 AEROSOL, POWDER ORAL at 08:11

## 2025-07-30 RX ADMIN — MONTELUKAST 10 MG: 10 TABLET, FILM COATED ORAL at 22:08

## 2025-07-30 RX ADMIN — POTASSIUM CHLORIDE 40 MEQ: 1500 TABLET, EXTENDED RELEASE ORAL at 11:37

## 2025-07-30 RX ADMIN — POTASSIUM CHLORIDE 40 MEQ: 1500 TABLET, EXTENDED RELEASE ORAL at 08:08

## 2025-07-30 RX ADMIN — SITAGLIPTIN 50 MG: 50 TABLET, FILM COATED ORAL at 08:10

## 2025-07-30 RX ADMIN — INSULIN LISPRO 1 UNITS: 100 INJECTION, SOLUTION INTRAVENOUS; SUBCUTANEOUS at 17:08

## 2025-07-30 RX ADMIN — BUDESONIDE AND FORMOTEROL FUMARATE DIHYDRATE 2 PUFF: 160; 4.5 AEROSOL RESPIRATORY (INHALATION) at 08:11

## 2025-07-31 LAB
ANION GAP SERPL CALCULATED.3IONS-SCNC: 12 MMOL/L (ref 4–13)
BUN SERPL-MCNC: 45 MG/DL (ref 5–25)
CALCIUM SERPL-MCNC: 8 MG/DL (ref 8.4–10.2)
CHLORIDE SERPL-SCNC: 88 MMOL/L (ref 96–108)
CO2 SERPL-SCNC: 35 MMOL/L (ref 21–32)
CREAT SERPL-MCNC: 2.4 MG/DL (ref 0.6–1.3)
GFR SERPL CREATININE-BSD FRML MDRD: 28 ML/MIN/1.73SQ M
GLUCOSE SERPL-MCNC: 171 MG/DL (ref 65–140)
GLUCOSE SERPL-MCNC: 180 MG/DL (ref 65–140)
GLUCOSE SERPL-MCNC: 228 MG/DL (ref 65–140)
GLUCOSE SERPL-MCNC: 267 MG/DL (ref 65–140)
MAGNESIUM SERPL-MCNC: 2.4 MG/DL (ref 1.9–2.7)
POTASSIUM SERPL-SCNC: 3.8 MMOL/L (ref 3.5–5.3)
SODIUM SERPL-SCNC: 135 MMOL/L (ref 135–147)

## 2025-07-31 PROCEDURE — 99232 SBSQ HOSP IP/OBS MODERATE 35: CPT | Performed by: INTERNAL MEDICINE

## 2025-07-31 PROCEDURE — 99232 SBSQ HOSP IP/OBS MODERATE 35: CPT | Performed by: PHYSICIAN ASSISTANT

## 2025-07-31 PROCEDURE — 82948 REAGENT STRIP/BLOOD GLUCOSE: CPT

## 2025-07-31 PROCEDURE — 80048 BASIC METABOLIC PNL TOTAL CA: CPT | Performed by: INTERNAL MEDICINE

## 2025-07-31 PROCEDURE — 83735 ASSAY OF MAGNESIUM: CPT | Performed by: INTERNAL MEDICINE

## 2025-07-31 RX ADMIN — EMPAGLIFLOZIN 10 MG: 10 TABLET, FILM COATED ORAL at 08:13

## 2025-07-31 RX ADMIN — MAGNESIUM OXIDE TAB 400 MG (241.3 MG ELEMENTAL MG) 400 MG: 400 (241.3 MG) TAB at 08:11

## 2025-07-31 RX ADMIN — LORATADINE 10 MG: 10 TABLET ORAL at 08:10

## 2025-07-31 RX ADMIN — POTASSIUM CHLORIDE 40 MEQ: 1500 TABLET, EXTENDED RELEASE ORAL at 11:31

## 2025-07-31 RX ADMIN — INSULIN LISPRO 2 UNITS: 100 INJECTION, SOLUTION INTRAVENOUS; SUBCUTANEOUS at 08:38

## 2025-07-31 RX ADMIN — BUDESONIDE AND FORMOTEROL FUMARATE DIHYDRATE 2 PUFF: 160; 4.5 AEROSOL RESPIRATORY (INHALATION) at 08:14

## 2025-07-31 RX ADMIN — POTASSIUM CHLORIDE 40 MEQ: 1500 TABLET, EXTENDED RELEASE ORAL at 08:11

## 2025-07-31 RX ADMIN — APIXABAN 5 MG: 5 TABLET, FILM COATED ORAL at 17:03

## 2025-07-31 RX ADMIN — METOLAZONE 5 MG: 5 TABLET ORAL at 08:37

## 2025-07-31 RX ADMIN — UMECLIDINIUM 1 PUFF: 62.5 AEROSOL, POWDER ORAL at 08:14

## 2025-07-31 RX ADMIN — MONTELUKAST 10 MG: 10 TABLET, FILM COATED ORAL at 21:27

## 2025-07-31 RX ADMIN — BUDESONIDE AND FORMOTEROL FUMARATE DIHYDRATE 2 PUFF: 160; 4.5 AEROSOL RESPIRATORY (INHALATION) at 17:03

## 2025-07-31 RX ADMIN — PREGABALIN 50 MG: 50 CAPSULE ORAL at 21:27

## 2025-07-31 RX ADMIN — BUMETANIDE 6 MG: 0.25 INJECTION INTRAMUSCULAR; INTRAVENOUS at 05:41

## 2025-07-31 RX ADMIN — SITAGLIPTIN 50 MG: 50 TABLET, FILM COATED ORAL at 08:12

## 2025-07-31 RX ADMIN — MAGNESIUM OXIDE TAB 400 MG (241.3 MG ELEMENTAL MG) 400 MG: 400 (241.3 MG) TAB at 17:03

## 2025-07-31 RX ADMIN — APIXABAN 5 MG: 5 TABLET, FILM COATED ORAL at 08:11

## 2025-07-31 RX ADMIN — INSULIN LISPRO 1 UNITS: 100 INJECTION, SOLUTION INTRAVENOUS; SUBCUTANEOUS at 17:48

## 2025-07-31 RX ADMIN — METOPROLOL SUCCINATE 50 MG: 50 TABLET, EXTENDED RELEASE ORAL at 08:11

## 2025-07-31 RX ADMIN — SPIRONOLACTONE 25 MG: 25 TABLET ORAL at 08:11

## 2025-07-31 RX ADMIN — INSULIN LISPRO 3 UNITS: 100 INJECTION, SOLUTION INTRAVENOUS; SUBCUTANEOUS at 13:05

## 2025-08-01 LAB
ANION GAP SERPL CALCULATED.3IONS-SCNC: 14 MMOL/L (ref 4–13)
BUN SERPL-MCNC: 51 MG/DL (ref 5–25)
CALCIUM SERPL-MCNC: 7.6 MG/DL (ref 8.4–10.2)
CHLORIDE SERPL-SCNC: 89 MMOL/L (ref 96–108)
CO2 SERPL-SCNC: 30 MMOL/L (ref 21–32)
CREAT SERPL-MCNC: 2.31 MG/DL (ref 0.6–1.3)
ERYTHROCYTE [DISTWIDTH] IN BLOOD BY AUTOMATED COUNT: 15.2 % (ref 11.6–15.1)
GFR SERPL CREATININE-BSD FRML MDRD: 30 ML/MIN/1.73SQ M
GLUCOSE SERPL-MCNC: 163 MG/DL (ref 65–140)
GLUCOSE SERPL-MCNC: 192 MG/DL (ref 65–140)
GLUCOSE SERPL-MCNC: 194 MG/DL (ref 65–140)
GLUCOSE SERPL-MCNC: 239 MG/DL (ref 65–140)
GLUCOSE SERPL-MCNC: 241 MG/DL (ref 65–140)
HCT VFR BLD AUTO: 36.8 % (ref 36.5–49.3)
HGB BLD-MCNC: 12 G/DL (ref 12–17)
MCH RBC QN AUTO: 30 PG (ref 26.8–34.3)
MCHC RBC AUTO-ENTMCNC: 32.6 G/DL (ref 31.4–37.4)
MCV RBC AUTO: 92 FL (ref 82–98)
PLATELET # BLD AUTO: 273 THOUSANDS/UL (ref 149–390)
PMV BLD AUTO: 10.1 FL (ref 8.9–12.7)
POTASSIUM SERPL-SCNC: 3.2 MMOL/L (ref 3.5–5.3)
RBC # BLD AUTO: 4 MILLION/UL (ref 3.88–5.62)
SODIUM SERPL-SCNC: 133 MMOL/L (ref 135–147)
WBC # BLD AUTO: 8.21 THOUSAND/UL (ref 4.31–10.16)

## 2025-08-01 PROCEDURE — 85027 COMPLETE CBC AUTOMATED: CPT | Performed by: INTERNAL MEDICINE

## 2025-08-01 PROCEDURE — 80048 BASIC METABOLIC PNL TOTAL CA: CPT | Performed by: INTERNAL MEDICINE

## 2025-08-01 PROCEDURE — 99231 SBSQ HOSP IP/OBS SF/LOW 25: CPT | Performed by: PHYSICIAN ASSISTANT

## 2025-08-01 PROCEDURE — 82948 REAGENT STRIP/BLOOD GLUCOSE: CPT

## 2025-08-01 PROCEDURE — 99232 SBSQ HOSP IP/OBS MODERATE 35: CPT | Performed by: INTERNAL MEDICINE

## 2025-08-01 RX ORDER — BUMETANIDE 2 MG/1
6 TABLET ORAL
Status: DISCONTINUED | OUTPATIENT
Start: 2025-08-01 | End: 2025-08-03 | Stop reason: HOSPADM

## 2025-08-01 RX ORDER — BUMETANIDE 2 MG/1
6 TABLET ORAL
Status: DISCONTINUED | OUTPATIENT
Start: 2025-08-01 | End: 2025-08-01

## 2025-08-01 RX ORDER — POTASSIUM CHLORIDE 1500 MG/1
40 TABLET, EXTENDED RELEASE ORAL ONCE
Status: COMPLETED | OUTPATIENT
Start: 2025-08-01 | End: 2025-08-01

## 2025-08-01 RX ADMIN — BUMETANIDE 6 MG: 2 TABLET ORAL at 17:23

## 2025-08-01 RX ADMIN — UMECLIDINIUM 1 PUFF: 62.5 AEROSOL, POWDER ORAL at 09:36

## 2025-08-01 RX ADMIN — APIXABAN 5 MG: 5 TABLET, FILM COATED ORAL at 17:23

## 2025-08-01 RX ADMIN — METOPROLOL SUCCINATE 50 MG: 50 TABLET, EXTENDED RELEASE ORAL at 09:34

## 2025-08-01 RX ADMIN — INSULIN LISPRO 2 UNITS: 100 INJECTION, SOLUTION INTRAVENOUS; SUBCUTANEOUS at 17:24

## 2025-08-01 RX ADMIN — SPIRONOLACTONE 25 MG: 25 TABLET ORAL at 17:23

## 2025-08-01 RX ADMIN — BUMETANIDE 6 MG: 2 TABLET ORAL at 11:36

## 2025-08-01 RX ADMIN — BUDESONIDE AND FORMOTEROL FUMARATE DIHYDRATE 2 PUFF: 160; 4.5 AEROSOL RESPIRATORY (INHALATION) at 17:24

## 2025-08-01 RX ADMIN — INSULIN LISPRO 2 UNITS: 100 INJECTION, SOLUTION INTRAVENOUS; SUBCUTANEOUS at 09:35

## 2025-08-01 RX ADMIN — MAGNESIUM OXIDE TAB 400 MG (241.3 MG ELEMENTAL MG) 400 MG: 400 (241.3 MG) TAB at 09:33

## 2025-08-01 RX ADMIN — LORATADINE 10 MG: 10 TABLET ORAL at 09:34

## 2025-08-01 RX ADMIN — MAGNESIUM OXIDE TAB 400 MG (241.3 MG ELEMENTAL MG) 400 MG: 400 (241.3 MG) TAB at 17:23

## 2025-08-01 RX ADMIN — MONTELUKAST 10 MG: 10 TABLET, FILM COATED ORAL at 21:11

## 2025-08-01 RX ADMIN — POTASSIUM CHLORIDE 40 MEQ: 1500 TABLET, EXTENDED RELEASE ORAL at 09:33

## 2025-08-01 RX ADMIN — POTASSIUM CHLORIDE 40 MEQ: 1500 TABLET, EXTENDED RELEASE ORAL at 17:23

## 2025-08-01 RX ADMIN — SITAGLIPTIN 50 MG: 50 TABLET, FILM COATED ORAL at 09:35

## 2025-08-01 RX ADMIN — PREGABALIN 50 MG: 50 CAPSULE ORAL at 21:11

## 2025-08-01 RX ADMIN — POTASSIUM CHLORIDE 40 MEQ: 1500 TABLET, EXTENDED RELEASE ORAL at 11:35

## 2025-08-01 RX ADMIN — APIXABAN 5 MG: 5 TABLET, FILM COATED ORAL at 09:34

## 2025-08-01 RX ADMIN — INSULIN LISPRO 3 UNITS: 100 INJECTION, SOLUTION INTRAVENOUS; SUBCUTANEOUS at 12:11

## 2025-08-01 RX ADMIN — BUDESONIDE AND FORMOTEROL FUMARATE DIHYDRATE 2 PUFF: 160; 4.5 AEROSOL RESPIRATORY (INHALATION) at 09:36

## 2025-08-01 RX ADMIN — EMPAGLIFLOZIN 10 MG: 10 TABLET, FILM COATED ORAL at 09:35

## 2025-08-02 LAB
ANION GAP SERPL CALCULATED.3IONS-SCNC: 16 MMOL/L (ref 4–13)
BUN SERPL-MCNC: 56 MG/DL (ref 5–25)
CALCIUM SERPL-MCNC: 8.7 MG/DL (ref 8.4–10.2)
CHLORIDE SERPL-SCNC: 85 MMOL/L (ref 96–108)
CO2 SERPL-SCNC: 32 MMOL/L (ref 21–32)
CREAT SERPL-MCNC: 2.32 MG/DL (ref 0.6–1.3)
GFR SERPL CREATININE-BSD FRML MDRD: 29 ML/MIN/1.73SQ M
GLUCOSE SERPL-MCNC: 186 MG/DL (ref 65–140)
GLUCOSE SERPL-MCNC: 211 MG/DL (ref 65–140)
GLUCOSE SERPL-MCNC: 240 MG/DL (ref 65–140)
GLUCOSE SERPL-MCNC: 296 MG/DL (ref 65–140)
POTASSIUM SERPL-SCNC: 3.4 MMOL/L (ref 3.5–5.3)
SODIUM SERPL-SCNC: 133 MMOL/L (ref 135–147)

## 2025-08-02 PROCEDURE — 82948 REAGENT STRIP/BLOOD GLUCOSE: CPT

## 2025-08-02 PROCEDURE — 80048 BASIC METABOLIC PNL TOTAL CA: CPT | Performed by: PHYSICIAN ASSISTANT

## 2025-08-02 PROCEDURE — 99232 SBSQ HOSP IP/OBS MODERATE 35: CPT | Performed by: INTERNAL MEDICINE

## 2025-08-02 PROCEDURE — 99232 SBSQ HOSP IP/OBS MODERATE 35: CPT | Performed by: FAMILY MEDICINE

## 2025-08-02 RX ADMIN — MAGNESIUM OXIDE TAB 400 MG (241.3 MG ELEMENTAL MG) 400 MG: 400 (241.3 MG) TAB at 17:45

## 2025-08-02 RX ADMIN — BUMETANIDE 6 MG: 2 TABLET ORAL at 06:44

## 2025-08-02 RX ADMIN — METOPROLOL SUCCINATE 50 MG: 50 TABLET, EXTENDED RELEASE ORAL at 08:07

## 2025-08-02 RX ADMIN — APIXABAN 5 MG: 5 TABLET, FILM COATED ORAL at 17:45

## 2025-08-02 RX ADMIN — MAGNESIUM OXIDE TAB 400 MG (241.3 MG ELEMENTAL MG) 400 MG: 400 (241.3 MG) TAB at 08:07

## 2025-08-02 RX ADMIN — BUDESONIDE AND FORMOTEROL FUMARATE DIHYDRATE 2 PUFF: 160; 4.5 AEROSOL RESPIRATORY (INHALATION) at 18:56

## 2025-08-02 RX ADMIN — UMECLIDINIUM 1 PUFF: 62.5 AEROSOL, POWDER ORAL at 08:09

## 2025-08-02 RX ADMIN — BUMETANIDE 6 MG: 2 TABLET ORAL at 12:09

## 2025-08-02 RX ADMIN — POTASSIUM CHLORIDE 40 MEQ: 1500 TABLET, EXTENDED RELEASE ORAL at 17:45

## 2025-08-02 RX ADMIN — BUDESONIDE AND FORMOTEROL FUMARATE DIHYDRATE 2 PUFF: 160; 4.5 AEROSOL RESPIRATORY (INHALATION) at 08:09

## 2025-08-02 RX ADMIN — POTASSIUM CHLORIDE 40 MEQ: 1500 TABLET, EXTENDED RELEASE ORAL at 08:07

## 2025-08-02 RX ADMIN — EMPAGLIFLOZIN 10 MG: 10 TABLET, FILM COATED ORAL at 08:09

## 2025-08-02 RX ADMIN — POTASSIUM CHLORIDE 40 MEQ: 1500 TABLET, EXTENDED RELEASE ORAL at 12:09

## 2025-08-02 RX ADMIN — PREGABALIN 50 MG: 50 CAPSULE ORAL at 22:59

## 2025-08-02 RX ADMIN — BUMETANIDE 6 MG: 2 TABLET ORAL at 17:45

## 2025-08-02 RX ADMIN — SPIRONOLACTONE 25 MG: 25 TABLET ORAL at 10:22

## 2025-08-02 RX ADMIN — LORATADINE 10 MG: 10 TABLET ORAL at 08:07

## 2025-08-02 RX ADMIN — INSULIN LISPRO 3 UNITS: 100 INJECTION, SOLUTION INTRAVENOUS; SUBCUTANEOUS at 08:08

## 2025-08-02 RX ADMIN — INSULIN LISPRO 4 UNITS: 100 INJECTION, SOLUTION INTRAVENOUS; SUBCUTANEOUS at 12:10

## 2025-08-02 RX ADMIN — SPIRONOLACTONE 25 MG: 25 TABLET ORAL at 17:45

## 2025-08-02 RX ADMIN — SITAGLIPTIN 50 MG: 50 TABLET, FILM COATED ORAL at 08:09

## 2025-08-02 RX ADMIN — APIXABAN 5 MG: 5 TABLET, FILM COATED ORAL at 08:07

## 2025-08-02 RX ADMIN — MONTELUKAST 10 MG: 10 TABLET, FILM COATED ORAL at 22:59

## 2025-08-02 RX ADMIN — INSULIN LISPRO 2 UNITS: 100 INJECTION, SOLUTION INTRAVENOUS; SUBCUTANEOUS at 17:43

## 2025-08-03 VITALS
HEIGHT: 73 IN | OXYGEN SATURATION: 93 % | DIASTOLIC BLOOD PRESSURE: 61 MMHG | RESPIRATION RATE: 17 BRPM | WEIGHT: 306.6 LBS | SYSTOLIC BLOOD PRESSURE: 78 MMHG | HEART RATE: 81 BPM | BODY MASS INDEX: 40.63 KG/M2 | TEMPERATURE: 98.3 F

## 2025-08-03 LAB
ANION GAP SERPL CALCULATED.3IONS-SCNC: 13 MMOL/L (ref 4–13)
BUN SERPL-MCNC: 57 MG/DL (ref 5–25)
CALCIUM SERPL-MCNC: 8.5 MG/DL (ref 8.4–10.2)
CHLORIDE SERPL-SCNC: 84 MMOL/L (ref 96–108)
CO2 SERPL-SCNC: 32 MMOL/L (ref 21–32)
CREAT SERPL-MCNC: 2.31 MG/DL (ref 0.6–1.3)
GFR SERPL CREATININE-BSD FRML MDRD: 30 ML/MIN/1.73SQ M
GLUCOSE SERPL-MCNC: 295 MG/DL (ref 65–140)
GLUCOSE SERPL-MCNC: 333 MG/DL (ref 65–140)
GLUCOSE SERPL-MCNC: 355 MG/DL (ref 65–140)
POTASSIUM SERPL-SCNC: 3.2 MMOL/L (ref 3.5–5.3)
SODIUM SERPL-SCNC: 129 MMOL/L (ref 135–147)

## 2025-08-03 PROCEDURE — 80048 BASIC METABOLIC PNL TOTAL CA: CPT | Performed by: INTERNAL MEDICINE

## 2025-08-03 PROCEDURE — 99232 SBSQ HOSP IP/OBS MODERATE 35: CPT | Performed by: INTERNAL MEDICINE

## 2025-08-03 PROCEDURE — 82948 REAGENT STRIP/BLOOD GLUCOSE: CPT

## 2025-08-03 PROCEDURE — 99239 HOSP IP/OBS DSCHRG MGMT >30: CPT | Performed by: INTERNAL MEDICINE

## 2025-08-03 RX ADMIN — APIXABAN 5 MG: 5 TABLET, FILM COATED ORAL at 09:27

## 2025-08-03 RX ADMIN — POTASSIUM CHLORIDE 40 MEQ: 1500 TABLET, EXTENDED RELEASE ORAL at 09:32

## 2025-08-03 RX ADMIN — BUDESONIDE AND FORMOTEROL FUMARATE DIHYDRATE 2 PUFF: 160; 4.5 AEROSOL RESPIRATORY (INHALATION) at 09:27

## 2025-08-03 RX ADMIN — LORATADINE 10 MG: 10 TABLET ORAL at 09:27

## 2025-08-03 RX ADMIN — SITAGLIPTIN 50 MG: 50 TABLET, FILM COATED ORAL at 09:30

## 2025-08-03 RX ADMIN — UMECLIDINIUM 1 PUFF: 62.5 AEROSOL, POWDER ORAL at 09:28

## 2025-08-03 RX ADMIN — METOPROLOL SUCCINATE 50 MG: 50 TABLET, EXTENDED RELEASE ORAL at 09:27

## 2025-08-03 RX ADMIN — EMPAGLIFLOZIN 10 MG: 10 TABLET, FILM COATED ORAL at 09:28

## 2025-08-03 RX ADMIN — INSULIN LISPRO 4 UNITS: 100 INJECTION, SOLUTION INTRAVENOUS; SUBCUTANEOUS at 09:34

## 2025-08-03 RX ADMIN — SPIRONOLACTONE 25 MG: 25 TABLET ORAL at 09:27

## 2025-08-03 RX ADMIN — MAGNESIUM OXIDE TAB 400 MG (241.3 MG ELEMENTAL MG) 400 MG: 400 (241.3 MG) TAB at 09:27

## 2025-08-03 RX ADMIN — BUMETANIDE 6 MG: 2 TABLET ORAL at 05:52

## 2025-08-04 ENCOUNTER — PATIENT OUTREACH (OUTPATIENT)
Dept: CASE MANAGEMENT | Facility: OTHER | Age: 60
End: 2025-08-04

## 2025-08-04 ENCOUNTER — TRANSITIONAL CARE MANAGEMENT (OUTPATIENT)
Dept: FAMILY MEDICINE CLINIC | Facility: CLINIC | Age: 60
End: 2025-08-04

## 2025-08-05 ENCOUNTER — TELEMEDICINE (OUTPATIENT)
Dept: FAMILY MEDICINE CLINIC | Facility: CLINIC | Age: 60
End: 2025-08-05
Payer: COMMERCIAL

## 2025-08-05 VITALS
SYSTOLIC BLOOD PRESSURE: 122 MMHG | HEART RATE: 82 BPM | DIASTOLIC BLOOD PRESSURE: 78 MMHG | BODY MASS INDEX: 40.24 KG/M2 | WEIGHT: 305 LBS

## 2025-08-05 DIAGNOSIS — I50.32 CHRONIC HEART FAILURE WITH PRESERVED EJECTION FRACTION (HFPEF, >= 50%) (HCC): Chronic | ICD-10-CM

## 2025-08-05 DIAGNOSIS — I50.33 ACUTE ON CHRONIC HEART FAILURE WITH PRESERVED EJECTION FRACTION (HFPEF) (HCC): ICD-10-CM

## 2025-08-05 DIAGNOSIS — I48.0 PAROXYSMAL ATRIAL FIBRILLATION (HCC): Chronic | ICD-10-CM

## 2025-08-05 DIAGNOSIS — N17.9 ACUTE KIDNEY INJURY (HCC): Primary | ICD-10-CM

## 2025-08-05 PROCEDURE — 99496 TRANSJ CARE MGMT HIGH F2F 7D: CPT | Performed by: NURSE PRACTITIONER

## 2025-08-06 ENCOUNTER — TELEPHONE (OUTPATIENT)
Age: 60
End: 2025-08-06

## 2025-08-06 ENCOUNTER — PATIENT OUTREACH (OUTPATIENT)
Dept: CASE MANAGEMENT | Facility: OTHER | Age: 60
End: 2025-08-06

## 2025-08-07 ENCOUNTER — TELEPHONE (OUTPATIENT)
Dept: CARDIOLOGY CLINIC | Facility: CLINIC | Age: 60
End: 2025-08-07

## 2025-08-11 ENCOUNTER — OFFICE VISIT (OUTPATIENT)
Dept: CARDIOLOGY CLINIC | Facility: CLINIC | Age: 60
End: 2025-08-11
Payer: COMMERCIAL

## 2025-08-14 ENCOUNTER — TELEPHONE (OUTPATIENT)
Dept: CARDIOLOGY CLINIC | Facility: CLINIC | Age: 60
End: 2025-08-14

## 2025-08-15 ENCOUNTER — APPOINTMENT (OUTPATIENT)
Dept: LAB | Facility: CLINIC | Age: 60
End: 2025-08-15
Payer: COMMERCIAL

## 2025-08-21 ENCOUNTER — TELEPHONE (OUTPATIENT)
Dept: CARDIOLOGY CLINIC | Facility: CLINIC | Age: 60
End: 2025-08-21

## (undated) DEVICE — JACKSON-PRATT 100CC BULB RESERVOIR: Brand: CARDINAL HEALTH

## (undated) DEVICE — PDS II VLT 0 107CM AG ST3: Brand: ENDOLOOP

## (undated) DEVICE — DGW .035 FC J3MM 260CM TEF: Brand: EMERALD

## (undated) DEVICE — TROCAR: Brand: KII FIOS FIRST ENTRY

## (undated) DEVICE — MEDI-VAC YANK SUCT HNDL W/TPRD BULBOUS TIP: Brand: CARDINAL HEALTH

## (undated) DEVICE — DRAIN JACKSON PRATT 15FR: Brand: CARDINAL HEALTH

## (undated) DEVICE — ENDO STITCH 2-0 SURGIDAC 48 IN

## (undated) DEVICE — SUT ETHIBOND 0 SH 30 IN X834H

## (undated) DEVICE — TROCARS: Brand: KII® OPTICAL ACCESS SYSTEM

## (undated) DEVICE — SUTURING DEVICE: Brand: ENDO STITCH

## (undated) DEVICE — GLIDESHEATH BASIC HYDROPHILIC COATED INTRODUCER SHEATH: Brand: GLIDESHEATH

## (undated) DEVICE — GLOVE SRG BIOGEL ECLIPSE 7.5

## (undated) DEVICE — LAPAROSCOPIC TROCAR SLEEVE/SINGLE USE: Brand: KII® SLEEVE

## (undated) DEVICE — ADHESIVE SKIN HIGH VISCOSITY EXOFIN 1ML

## (undated) DEVICE — ENDOPATH ECHELON ENDOSCOPIC LINEAR CUTTER RELOADS, GREEN, 60MM: Brand: ECHELON ENDOPATH

## (undated) DEVICE — DGW .035 FC J3MM 150CM TEF: Brand: EMERALD

## (undated) DEVICE — INSUFFLATION NEEDLE TO ESTABLISH PNEUMOPERITONEUM.: Brand: INSUFFLATION NEEDLE

## (undated) DEVICE — PINNACLE INTRODUCER SHEATH: Brand: PINNACLE

## (undated) DEVICE — CHLORAPREP HI-LITE 26ML ORANGE

## (undated) DEVICE — TUBING SMOKE EVAC W/FILTRATION DEVICE PLUMEPORT ACTIV

## (undated) DEVICE — ELECTRODE LAP L WIRE E-Z CLEAN 33CM -0100

## (undated) DEVICE — LIGAMAX 5 MM ENDOSCOPIC MULTIPLE CLIP APPLIER: Brand: LIGAMAX

## (undated) DEVICE — SUT SILK 2-0 SH 30 IN K833H

## (undated) DEVICE — MICROPUNCTURE 401

## (undated) DEVICE — SPHINCTEROTOME: Brand: DREAMTOME™ RX 44

## (undated) DEVICE — ECHELON FLEX POWERED PLUS COMPACT ARTICULATING ENDOSCOPIC LINEAR CUTTER, 60MM: Brand: ECHELON FLEX

## (undated) DEVICE — LAPAROSCOPIC TROCAR SLEEVE/SINGLE USE: Brand: KII® OPTICAL ACCESS SYSTEM

## (undated) DEVICE — DRAPE SHEET X-LG

## (undated) DEVICE — GUIDEWIRE WHOLEY HI TORQUE INTERM MOD J .035 145CM

## (undated) DEVICE — GUIDEWIRE WITH ICE™ HYDROPHILIC COATING: Brand: V-18™ CONTROL WIRE™

## (undated) DEVICE — ACCESS PLATFORM FOR MINIMALLY INVASIVE SURGERY: Brand: GELPOINT®  MINI ADVANCED ACCESS PLATFORM

## (undated) DEVICE — CATH SWAN GANZ MON 7FR 110CM

## (undated) DEVICE — INTENDED FOR TISSUE SEPARATION, AND OTHER PROCEDURES THAT REQUIRE A SHARP SURGICAL BLADE TO PUNCTURE OR CUT.: Brand: BARD-PARKER SAFETY BLADES SIZE 11, STERILE

## (undated) DEVICE — CATH SWAN GANZ TD 7FR 110CM

## (undated) DEVICE — PENCIL ELECTROSURG E-Z CLEAN -0035H

## (undated) DEVICE — PACK PBDS LAP CHOLE RF

## (undated) DEVICE — SPONGE LAP 18 X 18 IN STRL RFD

## (undated) DEVICE — RADIFOCUS OPTITORQUE ANGIOGRAPHIC CATHETER: Brand: OPTITORQUE

## (undated) DEVICE — 3000CC GUARDIAN II: Brand: GUARDIAN

## (undated) DEVICE — POOLE SUCTION HANDLE: Brand: CARDINAL HEALTH

## (undated) DEVICE — TROCAR: Brand: KII® SLEEVE

## (undated) DEVICE — PROXIMATE SKIN STAPLERS (35 WIDE) CONTAINS 35 STAINLESS STEEL STAPLES (FIXED HEAD): Brand: PROXIMATE

## (undated) DEVICE — SUT VICRYL PLUS 0 UR-6 27IN VCP603H

## (undated) DEVICE — RETRIEVAL BALLOON CATHETER: Brand: EXTRACTOR™ PRO RX

## (undated) DEVICE — GAUZE SPONGES,16 PLY: Brand: CURITY

## (undated) DEVICE — Device: Brand: OMNICLOSE TROCAR SITE CLOSURE DEVICE

## (undated) DEVICE — SUT MONOCRYL 4-0 PS-2 18 IN Y496G

## (undated) DEVICE — RADIFOCUS GLIDEWIRE: Brand: GLIDEWIRE

## (undated) DEVICE — SUT VICRYL 2-0 CT-2 27 IN J269H

## (undated) DEVICE — ABDOMINAL PAD: Brand: DERMACEA

## (undated) DEVICE — ELECTRODE BLADE MOD  E-Z CLEAN 6.5IN -0014M

## (undated) DEVICE — TUBING SUCTION 5MM X 12 FT

## (undated) DEVICE — SUT SILK 3-0 SH CR/8 18 IN C013D